# Patient Record
Sex: FEMALE | Race: WHITE | NOT HISPANIC OR LATINO | Employment: OTHER | URBAN - METROPOLITAN AREA
[De-identification: names, ages, dates, MRNs, and addresses within clinical notes are randomized per-mention and may not be internally consistent; named-entity substitution may affect disease eponyms.]

---

## 2017-01-02 ENCOUNTER — APPOINTMENT (OUTPATIENT)
Dept: GENERAL RADIOLOGY | Facility: CLINIC | Age: 27
End: 2017-01-02
Attending: FAMILY MEDICINE
Payer: MEDICARE

## 2017-01-02 ENCOUNTER — HOSPITAL ENCOUNTER (EMERGENCY)
Facility: CLINIC | Age: 27
Discharge: HOME | End: 2017-01-02
Attending: FAMILY MEDICINE | Admitting: FAMILY MEDICINE
Payer: MEDICARE

## 2017-01-02 VITALS
RESPIRATION RATE: 16 BRPM | BODY MASS INDEX: 25.6 KG/M2 | DIASTOLIC BLOOD PRESSURE: 79 MMHG | SYSTOLIC BLOOD PRESSURE: 125 MMHG | OXYGEN SATURATION: 98 % | HEART RATE: 72 BPM | TEMPERATURE: 98.7 F | WEIGHT: 140 LBS

## 2017-01-02 DIAGNOSIS — R10.84 ABDOMINAL PAIN, GENERALIZED: ICD-10-CM

## 2017-01-02 LAB
ALBUMIN SERPL-MCNC: 4.2 G/DL (ref 3.4–5)
ALP SERPL-CCNC: 77 U/L (ref 40–150)
ALT SERPL W P-5'-P-CCNC: 27 U/L (ref 0–50)
ANION GAP SERPL CALCULATED.3IONS-SCNC: 8 MMOL/L (ref 3–14)
AST SERPL W P-5'-P-CCNC: 21 U/L (ref 0–45)
BASOPHILS # BLD AUTO: 0 10E9/L (ref 0–0.2)
BASOPHILS NFR BLD AUTO: 0.3 %
BILIRUB SERPL-MCNC: 0.3 MG/DL (ref 0.2–1.3)
BUN SERPL-MCNC: 15 MG/DL (ref 7–30)
CALCIUM SERPL-MCNC: 9 MG/DL (ref 8.5–10.1)
CHLORIDE SERPL-SCNC: 110 MMOL/L (ref 94–109)
CO2 SERPL-SCNC: 25 MMOL/L (ref 20–32)
CREAT SERPL-MCNC: 0.78 MG/DL (ref 0.52–1.04)
DIFFERENTIAL METHOD BLD: ABNORMAL
EOSINOPHIL # BLD AUTO: 0.1 10E9/L (ref 0–0.7)
EOSINOPHIL NFR BLD AUTO: 2.3 %
ERYTHROCYTE [DISTWIDTH] IN BLOOD BY AUTOMATED COUNT: 11.9 % (ref 10–15)
GFR SERPL CREATININE-BSD FRML MDRD: 88 ML/MIN/1.7M2
GLUCOSE SERPL-MCNC: 96 MG/DL (ref 70–99)
HCT VFR BLD AUTO: 37.8 % (ref 35–47)
HGB BLD-MCNC: 13.3 G/DL (ref 11.7–15.7)
IMM GRANULOCYTES # BLD: 0 10E9/L (ref 0–0.4)
IMM GRANULOCYTES NFR BLD: 0.2 %
LYMPHOCYTES # BLD AUTO: 1.5 10E9/L (ref 0.8–5.3)
LYMPHOCYTES NFR BLD AUTO: 26.6 %
MCH RBC QN AUTO: 33.8 PG (ref 26.5–33)
MCHC RBC AUTO-ENTMCNC: 35.2 G/DL (ref 31.5–36.5)
MCV RBC AUTO: 96 FL (ref 78–100)
MONOCYTES # BLD AUTO: 0.7 10E9/L (ref 0–1.3)
MONOCYTES NFR BLD AUTO: 11.8 %
NEUTROPHILS # BLD AUTO: 3.4 10E9/L (ref 1.6–8.3)
NEUTROPHILS NFR BLD AUTO: 58.8 %
PLATELET # BLD AUTO: 232 10E9/L (ref 150–450)
POTASSIUM SERPL-SCNC: 4 MMOL/L (ref 3.4–5.3)
PROT SERPL-MCNC: 7.5 G/DL (ref 6.8–8.8)
RBC # BLD AUTO: 3.94 10E12/L (ref 3.8–5.2)
SODIUM SERPL-SCNC: 143 MMOL/L (ref 133–144)
WBC # BLD AUTO: 5.8 10E9/L (ref 4–11)

## 2017-01-02 PROCEDURE — 99285 EMERGENCY DEPT VISIT HI MDM: CPT | Mod: 25

## 2017-01-02 PROCEDURE — 25000128 H RX IP 250 OP 636: Performed by: FAMILY MEDICINE

## 2017-01-02 PROCEDURE — 25000132 ZZH RX MED GY IP 250 OP 250 PS 637: Mod: GY | Performed by: FAMILY MEDICINE

## 2017-01-02 PROCEDURE — 80053 COMPREHEN METABOLIC PANEL: CPT | Performed by: FAMILY MEDICINE

## 2017-01-02 PROCEDURE — 96376 TX/PRO/DX INJ SAME DRUG ADON: CPT

## 2017-01-02 PROCEDURE — 25000125 ZZHC RX 250: Performed by: FAMILY MEDICINE

## 2017-01-02 PROCEDURE — 74020 XR ABDOMEN 2 VW: CPT | Mod: TC

## 2017-01-02 PROCEDURE — 85025 COMPLETE CBC W/AUTO DIFF WBC: CPT | Performed by: FAMILY MEDICINE

## 2017-01-02 PROCEDURE — 96374 THER/PROPH/DIAG INJ IV PUSH: CPT

## 2017-01-02 PROCEDURE — 99285 EMERGENCY DEPT VISIT HI MDM: CPT | Performed by: FAMILY MEDICINE

## 2017-01-02 PROCEDURE — A9270 NON-COVERED ITEM OR SERVICE: HCPCS | Mod: GY | Performed by: FAMILY MEDICINE

## 2017-01-02 PROCEDURE — 96361 HYDRATE IV INFUSION ADD-ON: CPT

## 2017-01-02 PROCEDURE — 96375 TX/PRO/DX INJ NEW DRUG ADDON: CPT

## 2017-01-02 RX ORDER — LIDOCAINE 40 MG/G
CREAM TOPICAL
Status: DISCONTINUED | OUTPATIENT
Start: 2017-01-02 | End: 2017-01-02 | Stop reason: HOSPADM

## 2017-01-02 RX ORDER — PROMETHAZINE HYDROCHLORIDE 25 MG/ML
25 INJECTION, SOLUTION INTRAMUSCULAR; INTRAVENOUS ONCE
Status: DISCONTINUED | OUTPATIENT
Start: 2017-01-02 | End: 2017-01-02 | Stop reason: HOSPADM

## 2017-01-02 RX ORDER — ONDANSETRON 2 MG/ML
8 INJECTION INTRAMUSCULAR; INTRAVENOUS ONCE
Status: COMPLETED | OUTPATIENT
Start: 2017-01-02 | End: 2017-01-02

## 2017-01-02 RX ORDER — HYDROCODONE BITARTRATE AND ACETAMINOPHEN 5; 325 MG/1; MG/1
1-2 TABLET ORAL EVERY 4 HOURS PRN
Qty: 12 TABLET | Refills: 0 | Status: SHIPPED | OUTPATIENT
Start: 2017-01-02 | End: 2017-01-30

## 2017-01-02 RX ORDER — POLYETHYLENE GLYCOL 3350 17 G/17G
1 POWDER, FOR SOLUTION ORAL DAILY
Qty: 527 G | Refills: 0 | Status: SHIPPED | OUTPATIENT
Start: 2017-01-02 | End: 2017-02-01

## 2017-01-02 RX ORDER — HYDROMORPHONE HYDROCHLORIDE 1 MG/ML
0.5 INJECTION, SOLUTION INTRAMUSCULAR; INTRAVENOUS; SUBCUTANEOUS
Status: COMPLETED | OUTPATIENT
Start: 2017-01-02 | End: 2017-01-02

## 2017-01-02 RX ORDER — ONDANSETRON 2 MG/ML
4 INJECTION INTRAMUSCULAR; INTRAVENOUS EVERY 30 MIN PRN
Status: DISCONTINUED | OUTPATIENT
Start: 2017-01-02 | End: 2017-01-02

## 2017-01-02 RX ORDER — DIPHENHYDRAMINE HYDROCHLORIDE 50 MG/ML
25 INJECTION INTRAMUSCULAR; INTRAVENOUS ONCE
Status: COMPLETED | OUTPATIENT
Start: 2017-01-02 | End: 2017-01-02

## 2017-01-02 RX ORDER — SODIUM CHLORIDE 9 MG/ML
1000 INJECTION, SOLUTION INTRAVENOUS CONTINUOUS
Status: DISCONTINUED | OUTPATIENT
Start: 2017-01-02 | End: 2017-01-02 | Stop reason: HOSPADM

## 2017-01-02 RX ORDER — ONDANSETRON 4 MG/1
4 TABLET, ORALLY DISINTEGRATING ORAL EVERY 8 HOURS PRN
Qty: 10 TABLET | Refills: 0 | Status: SHIPPED | OUTPATIENT
Start: 2017-01-02 | End: 2017-01-05

## 2017-01-02 RX ADMIN — ONDANSETRON HYDROCHLORIDE 8 MG: 2 SOLUTION INTRAMUSCULAR; INTRAVENOUS at 08:54

## 2017-01-02 RX ADMIN — HYDROMORPHONE HYDROCHLORIDE 0.5 MG: 1 INJECTION, SOLUTION INTRAMUSCULAR; INTRAVENOUS; SUBCUTANEOUS at 10:08

## 2017-01-02 RX ADMIN — PROCHLORPERAZINE EDISYLATE 5 MG: 5 INJECTION INTRAMUSCULAR; INTRAVENOUS at 10:31

## 2017-01-02 RX ADMIN — HYDROMORPHONE HYDROCHLORIDE 0.5 MG: 1 INJECTION, SOLUTION INTRAMUSCULAR; INTRAVENOUS; SUBCUTANEOUS at 09:09

## 2017-01-02 RX ADMIN — LIDOCAINE HYDROCHLORIDE 30 ML: 20 SOLUTION ORAL; TOPICAL at 11:17

## 2017-01-02 RX ADMIN — HYDROMORPHONE HYDROCHLORIDE 0.5 MG: 1 INJECTION, SOLUTION INTRAMUSCULAR; INTRAVENOUS; SUBCUTANEOUS at 09:32

## 2017-01-02 RX ADMIN — DIPHENHYDRAMINE HYDROCHLORIDE 25 MG: 50 INJECTION, SOLUTION INTRAMUSCULAR; INTRAVENOUS at 09:06

## 2017-01-02 RX ADMIN — DIPHENHYDRAMINE HYDROCHLORIDE 25 MG: 50 INJECTION, SOLUTION INTRAMUSCULAR; INTRAVENOUS at 10:25

## 2017-01-02 RX ADMIN — SODIUM CHLORIDE 1000 ML: 9 INJECTION, SOLUTION INTRAVENOUS at 09:03

## 2017-01-02 NOTE — ED AVS SNAPSHOT
[facility] Emergency Department    [address]     [place] [address]    Phone:  [phone]    Fax:  [phone]                                       Katy Islas   MRN: [id number]    Department:  [facility] Emergency Department   Date of Visit:  1/2/2017           Patient Information     Date Of Birth          1990        Your diagnoses for this visit were:     Abdominal pain, generalized        You were seen by Oswaldo Zamora MD.      Follow-up Information     Follow up with Aura Rosario PA-C. Schedule an appointment as soon as possible for a visit in 2 days.    Specialty:  Physician Assistant - Medical    Why:  If not improving.    Contact information:    [address]  [facility] [address]  [phone]          Discharge Instructions         *Abdominal Pain, Unknown Cause (Female)    The exact cause of your abdominal (stomach) pain is not certain. This does not mean that this is something to worry about, or the right tests were not done. Everyone likes to know the exact cause of the problem, but sometimes with abdominal pain, there is no clear-cut cause, and this could be a good thing. The good news is that your symptoms can be treated, and you will feel better.   Your condition does not seem serious now; however, sometimes the signs of a serious problem may take more time to appear. For this reason, it is important for you to watch for any new symptoms, problems, or worsening of your condition.  Over the next few days, the abdominal pain may come and go, or be continuous. Other common symptoms can include nausea and vomiting. Sometimes it can be difficult to tell if you feel nauseous, you may just feel bad and not associate that feeling with nausea. Constipation, diarrhea, and a fever may go along with the pain.  The pain may continue even if treated correctly over the following days. Depending on how things  go, sometimes the cause can become clear and may require further or different treatment. Additional evaluations, medications, or tests may be needed.  Home care  Your health care provider may prescribe medications for pain, symptoms, or an infection.  Follow the health care provider's instructions for taking these medications.  General care    Rest until your next exam. No strenuous activities.    Try to find positions that ease discomfort. A small pillow placed on the abdomen may help relieve pain.    Something warm on your abdomen (such as a heating pad) may help, but be careful not to burn yourself.  Diet    Do not force yourself to eat, especially if having cramps, vomiting, or diarrhea.    Water is important so you do not get dehydrated. Soup may also be good. Sports drinks may also help, especially if they are not too acidic. Make sure you don't drink sugary drinks as this can make things worse. Take liquids in small amounts. Do not guzzle them.    Caffeine sometimes makes the pain and cramping worse.    Avoid dairy products if you have vomiting or diarrhea.    Don't eat large amounts at a time. Wait a few minutes between bites.    Eat a diet low in fiber (called a low-residue diet). Foods allowed include refined breads, white rice, fruit and vegetable juices without pulp, tender meats. These foods will pass more easily through the intestine.    Avoid fried or fatty foods, dairy, alcohol and spicy foods until your symptoms go away.  Follow-up care  Follow up with your health care provider as instructed, or if your pain does not begin to improve in the next 24 hours.  When to seek medical care  Seek prompt medical care if any of the following occur:    Pain gets worse or moves to the right lower abdomen    New or worsening vomiting or diarrhea    Swelling of the abdomen    Unable to pass stool for more than three days    New fever over 101  F (38.3 C), or rising fever    Blood in vomit or bowel movements (dark  red or black color)    Jaundice (yellow color of eyes and skin)    Weakness, dizziness    Chest, arm, back, neck or jaw pain    Unexpected vaginal bleeding or missed period  Call 911  Call emergency services if any of the following occur:    Trouble breathing    Confusion    Fainting or loss of consciousness    Rapid heart rate    Seizure    [id number] Anny Hicks, [address] [facility], [place] [address]. All rights reserved. This information is not intended as a substitute for professional medical care. Always follow your healthcare professional's instructions.          Future Appointments        Provider Department Dept Phone Center    2/1/2017 1:30 PM SHAYNA Matthew [facility] [phone] [facility]    2/1/2017 2:15 PM Vargas Langley MD, MD [facility] [phone] [facility]      24 Hour Appointment Hotline       To make an appointment at any [facility], call [phone] ([phone]). If you don't have a family doctor or clinic, we will help you find one. [facility] are conveniently located to serve the needs of you and your family.             Review of your medicines      START taking        Dose / Directions Last dose taken    HYDROcodone-acetaminophen 5-325 MG per tablet   Commonly known as:  NORCO   Dose:  1-2 tablet   Quantity:  12 tablet        Take 1-2 tablets by mouth every 4 hours as needed for moderate to severe pain   Refills:  0        ondansetron 4 MG ODT tab   Commonly known as:  ZOFRAN ODT   Dose:  4 mg   Quantity:  10 tablet        Take 1 tablet (4 mg) by mouth every 8 hours as needed   Refills:  0        polyethylene glycol powder   Commonly known as:  MIRALAX   Dose:  1 capful   Quantity:  527 g        Take 17 g (1 capful) by mouth daily   Refills:  0          Our records show that you are taking the medicines listed below. If these are incorrect, please call your family doctor or clinic.        Dose / Directions Last  dose taken    albuterol 108 (90 BASE) MCG/ACT Inhaler   Commonly known as:  PROAIR HFA/PROVENTIL HFA/VENTOLIN HFA   Dose:  2 puff   Quantity:  3 Inhaler        Inhale 2 puffs into the lungs every 6 hours as needed for shortness of breath / dyspnea or wheezing   Refills:  1        ALLEGRA 180 MG tablet   Generic drug:  fexofenadine        Take by mouth daily   Refills:  0        ALPRAZolam 0.5 MG tablet   Commonly known as:  XANAX   Dose:  0.5 mg   Quantity:  30 tablet        Take 1 tablet (0.5 mg) by mouth 3 times daily as needed for anxiety   Refills:  0        ARIPiprazole 2 MG tablet   Commonly known as:  ABILIFY   Dose:  2 mg   Quantity:  30 tablet        Take 1 tablet (2 mg) by mouth At Bedtime   Refills:  1        busPIRone 10 MG tablet   Commonly known as:  BUSPAR   Quantity:  25 tablet        1 tablet twice a day for 1 week then 1/2 tablet twice a day for 1 week, then can stop   Refills:  0        cyclobenzaprine 10 MG tablet   Commonly known as:  FLEXERIL   Dose:  10 mg   Quantity:  60 tablet        Take 1 tablet (10 mg) by mouth 2 times daily as needed for muscle spasms or other (back pain)   Refills:  3        dexlansoprazole 60 MG Cpdr CR capsule   Commonly known as:  DEXILANT   Dose:  60 mg   Quantity:  30 capsule        Take 1 capsule (60 mg) by mouth daily   Refills:  3        dicyclomine 20 MG tablet   Commonly known as:  BENTYL   Dose:  20 mg   Quantity:  30 tablet        Take 1 tablet (20 mg) by mouth 4 times daily as needed (ABDOMINAL CRAMPING) AS NEEDED FOR CRAMPING   Refills:  0        DULoxetine 60 MG EC capsule   Commonly known as:  CYMBALTA   Dose:  60 mg   Quantity:  90 capsule        Take 1 capsule (60 mg) by mouth daily   Refills:  3        EPINEPHrine 0.3 MG/0.3ML injection   Dose:  0.3 mg   Quantity:  0.6 mL        Inject 0.3 mLs (0.3 mg) into the muscle once as needed for anaphylaxis   Refills:  1        FLEX-A-MIN JOINT FLEX PO        Take by mouth daily   Refills:  0         fluticasone 50 MCG/ACT spray   Commonly known as:  FLONASE   Dose:  2 spray   Quantity:  1 Package        Spray 2 sprays into both nostrils daily   Refills:  5        fluticasone-salmeterol 100-50 MCG/DOSE diskus inhaler   Commonly known as:  ADVAIR   Dose:  1 puff   Quantity:  1 Inhaler        Inhale 1 puff into the lungs 2 times daily   Refills:  5        gabapentin 300 MG capsule   Commonly known as:  NEURONTIN   Dose:  300 mg   Quantity:  270 capsule        Take 1 capsule (300 mg) by mouth 3 times daily   Refills:  1        Multi-vitamin Tabs tablet   Dose:  1 tablet   Generic drug:  multivitamin, therapeutic with minerals        Take 1 tablet by mouth daily.   Refills:  0        naproxen 500 MG tablet   Commonly known as:  NAPROSYN   Dose:  500 mg   Quantity:  180 tablet        Take 1 tablet (500 mg) by mouth 2 times daily as needed for moderate pain   Refills:  1        order for DME   Quantity:  1 Units        Equipment being ordered: Nebulizer   Refills:  0        promethazine 25 MG tablet   Commonly known as:  PHENERGAN        Take by mouth every 6 hours as needed for nausea   Refills:  0        sucralfate 1 GM tablet   Commonly known as:  CARAFATE        Take by mouth 4 times daily   Refills:  0        Vitamin D-3 5000 UNITS Tabs        Take by mouth daily   Refills:  0                Prescriptions were sent or printed at these locations (3 Prescriptions)                   [facility] [place], [place] - [address] [facility]    [address] , [facility] [address]    Telephone:  [phone]   Fax:  [phone]   Hours:                  E-Prescribed (2 of 3)         ondansetron (ZOFRAN ODT) 4 MG ODT tab               polyethylene glycol (MIRALAX) powder                 Printed at Department/Unit printer (1 of 3)         HYDROcodone-acetaminophen (NORCO) 5-325 MG per tablet                Procedures and tests performed during your visit     CBC with platelets differential    Comprehensive  metabolic panel    Peripheral IV catheter    XR Abdomen 2 Views      Orders Needing Specimen Collection     Ordered          01/02/17 0827  UA with Microscopic - STAT, Prio: STAT, Needs to be Collected     Scheduled Task Status   01/02/17 0828 Collect UA with Microscopic Open   Order Class:  PCU Collect                01/02/17 0921  HCG qualitative urine - STAT, Prio: STAT, Needs to be Collected     Scheduled Task Status   01/02/17 0922 Collect HCG qualitative urine Open   Order Class:  PCU Collect                  Pending Results     No orders found from 1/1/2017 to 1/3/2017.            Thank you for choosing [facility]       Thank you for choosing [facility] for your care. Our goal is always to provide you with excellent care. Hearing back from our patients is one way we can continue to improve our services. Please take a few minutes to complete the written survey that you may receive in the mail after you visit with us. Thank you!        [employer] Information     [employer] gives you secure access to your electronic health record. If you see a primary care provider, you can also send messages to your care team and make appointments. If you have questions, please call your primary care clinic.  If you do not have a primary care provider, please call [phone] and they will assist you.        After Visit Summary       This is your record. Keep this with you and show to your community pharmacist(s) and doctor(s) at your next visit.

## 2017-01-02 NOTE — DISCHARGE INSTRUCTIONS
*Abdominal Pain, Unknown Cause (Female)    The exact cause of your abdominal (stomach) pain is not certain. This does not mean that this is something to worry about, or the right tests were not done. Everyone likes to know the exact cause of the problem, but sometimes with abdominal pain, there is no clear-cut cause, and this could be a good thing. The good news is that your symptoms can be treated, and you will feel better.   Your condition does not seem serious now; however, sometimes the signs of a serious problem may take more time to appear. For this reason, it is important for you to watch for any new symptoms, problems, or worsening of your condition.  Over the next few days, the abdominal pain may come and go, or be continuous. Other common symptoms can include nausea and vomiting. Sometimes it can be difficult to tell if you feel nauseous, you may just feel bad and not associate that feeling with nausea. Constipation, diarrhea, and a fever may go along with the pain.  The pain may continue even if treated correctly over the following days. Depending on how things go, sometimes the cause can become clear and may require further or different treatment. Additional evaluations, medications, or tests may be needed.  Home care  Your health care provider may prescribe medications for pain, symptoms, or an infection.  Follow the health care provider's instructions for taking these medications.  General care    Rest until your next exam. No strenuous activities.    Try to find positions that ease discomfort. A small pillow placed on the abdomen may help relieve pain.    Something warm on your abdomen (such as a heating pad) may help, but be careful not to burn yourself.  Diet    Do not force yourself to eat, especially if having cramps, vomiting, or diarrhea.    Water is important so you do not get dehydrated. Soup may also be good. Sports drinks may also help, especially if they are not too acidic. Make sure you  don't drink sugary drinks as this can make things worse. Take liquids in small amounts. Do not guzzle them.    Caffeine sometimes makes the pain and cramping worse.    Avoid dairy products if you have vomiting or diarrhea.    Don't eat large amounts at a time. Wait a few minutes between bites.    Eat a diet low in fiber (called a low-residue diet). Foods allowed include refined breads, white rice, fruit and vegetable juices without pulp, tender meats. These foods will pass more easily through the intestine.    Avoid fried or fatty foods, dairy, alcohol and spicy foods until your symptoms go away.  Follow-up care  Follow up with your health care provider as instructed, or if your pain does not begin to improve in the next 24 hours.  When to seek medical care  Seek prompt medical care if any of the following occur:    Pain gets worse or moves to the right lower abdomen    New or worsening vomiting or diarrhea    Swelling of the abdomen    Unable to pass stool for more than three days    New fever over 101  F (38.3 C), or rising fever    Blood in vomit or bowel movements (dark red or black color)    Jaundice (yellow color of eyes and skin)    Weakness, dizziness    Chest, arm, back, neck or jaw pain    Unexpected vaginal bleeding or missed period  Call 911  Call emergency services if any of the following occur:    Trouble breathing    Confusion    Fainting or loss of consciousness    Rapid heart rate    Seizure    [id number] Anny [facility]ueNew [facility], [address] [place] [address], [place] [address]. All rights reserved. This information is not intended as a substitute for professional medical care. Always follow your healthcare professional's instructions.

## 2017-01-02 NOTE — ED PROVIDER NOTES
History     Chief Complaint   Patient presents with     Abdominal Pain     HPI  Katy Islas is a 26 year old female who presents with abdominal pain and nausea and vomiting that started yesterday.  Patient states that the pain started 1st and then she developed episodes of vomiting and diarrhea.  She has a history of Crohn's and she feels like might be her Crohn's acting up again.  She is not on any controller medications for her Crohn's but she has been on prednisone in the past when it has flared up.  She denies any blood in her vomit or blood barstools.  She denies any dysuria or hematuria.  She tried Compazine at home which did not help.  Patient states nothing makes the pain better or worse.  The pain is constant and located in the left side of her abdomen.  She is requesting medications for her pain which include: Zofran, Dilaudid, Benadryl.    I have reviewed the Medications, Allergies, Past Medical and Surgical History, and Social History in the Epic system.    Review of Systems   All other systems reviewed and are negative.      Physical Exam   BP: 134/89 mmHg  Pulse: 97  Temp: 98.7  F (37.1  C)  Resp: 14  Weight: 63.504 kg (140 lb)  SpO2: 99 %  Physical Exam   Constitutional: No distress.   HENT:   Head: Atraumatic.   Mouth/Throat: Oropharynx is clear and moist. No oropharyngeal exudate.   Eyes: Pupils are equal, round, and reactive to light. No scleral icterus.   Cardiovascular: Normal heart sounds and intact distal pulses.    Pulmonary/Chest: Breath sounds normal. No respiratory distress.   Abdominal: Soft. Bowel sounds are normal. There is tenderness (LUQ/RUQ).   Musculoskeletal: She exhibits no edema or tenderness.   Skin: Skin is warm. No rash noted. She is not diaphoretic.       ED Course   Procedures        Results for orders placed or performed during the hospital encounter of 01/02/17   XR Abdomen 2 Views    Narrative    XR ABDOMEN 2 VW 1/2/2017 9:27 AM    HISTORY: Vomiting.    COMPARISON:  5/17/2015    FINDINGS: No evidence of free intraperitoneal air. The bowel gas  pattern is nonobstructive. Moderate stool in the colon.  Cholecystectomy clips.      Impression    IMPRESSION: Nonobstructive gas pattern.    MARICHUY HERNANDEZ MD   CBC with platelets differential   Result Value Ref Range    WBC 5.8 4.0 - 11.0 10e9/L    RBC Count 3.94 3.8 - 5.2 10e12/L    Hemoglobin 13.3 11.7 - 15.7 g/dL    Hematocrit 37.8 35.0 - 47.0 %    MCV 96 78 - 100 fl    MCH 33.8 (H) 26.5 - 33.0 pg    MCHC 35.2 31.5 - 36.5 g/dL    RDW 11.9 10.0 - 15.0 %    Platelet Count 232 150 - 450 10e9/L    Diff Method Automated Method     % Neutrophils 58.8 %    % Lymphocytes 26.6 %    % Monocytes 11.8 %    % Eosinophils 2.3 %    % Basophils 0.3 %    % Immature Granulocytes 0.2 %    Absolute Neutrophil 3.4 1.6 - 8.3 10e9/L    Absolute Lymphocytes 1.5 0.8 - 5.3 10e9/L    Absolute Monocytes 0.7 0.0 - 1.3 10e9/L    Absolute Eosinophils 0.1 0.0 - 0.7 10e9/L    Absolute Basophils 0.0 0.0 - 0.2 10e9/L    Abs Immature Granulocytes 0.0 0 - 0.4 10e9/L   Comprehensive metabolic panel   Result Value Ref Range    Sodium 143 133 - 144 mmol/L    Potassium 4.0 3.4 - 5.3 mmol/L    Chloride 110 (H) 94 - 109 mmol/L    Carbon Dioxide 25 20 - 32 mmol/L    Anion Gap 8 3 - 14 mmol/L    Glucose 96 70 - 99 mg/dL    Urea Nitrogen 15 7 - 30 mg/dL    Creatinine 0.78 0.52 - 1.04 mg/dL    GFR Estimate 88 >60 mL/min/1.7m2    GFR Estimate If Black >90   GFR Calc   >60 mL/min/1.7m2    Calcium 9.0 8.5 - 10.1 mg/dL    Bilirubin Total 0.3 0.2 - 1.3 mg/dL    Albumin 4.2 3.4 - 5.0 g/dL    Protein Total 7.5 6.8 - 8.8 g/dL    Alkaline Phosphatase 77 40 - 150 U/L    ALT 27 0 - 50 U/L    AST 21 0 - 45 U/L     *Note: Due to a large number of results and/or encounters for the requested time period, some results have not been displayed. A complete set of results can be found in Results Review.     Medications   lidocaine 1 % 1 mL (not administered)   lidocaine (LMX4) kit  (not administered)   sodium chloride (PF) 0.9% PF flush 3 mL (not administered)   sodium chloride (PF) 0.9% PF flush 3 mL (not administered)   0.9% sodium chloride BOLUS (1,000 mLs Intravenous New Bag 1/2/17 0903)     Followed by   0.9% sodium chloride infusion (not administered)   promethazine (PHENERGAN) IV injection 25 mg (25 mg Intravenous Not Given 1/2/17 0954)   HYDROmorphone (PF) (DILAUDID) injection 0.5 mg (0.5 mg Intravenous Given 1/2/17 1008)   diphenhydrAMINE (BENADRYL) injection 25 mg (25 mg Intravenous Given 1/2/17 0906)   ondansetron (ZOFRAN) injection 8 mg (8 mg Intravenous Given 1/2/17 0854)   diphenhydrAMINE (BENADRYL) injection 25 mg (25 mg Intravenous Given 1/2/17 1025)   lidocaine (XYLOCAINE) 2 % 15 mL, alum & mag hydroxide-simethicone (MYLANTA ES/MAALOX  ES) 15 mL GI Cocktail (30 mLs Oral Given 1/2/17 1117)   prochlorperazine (COMPAZINE) injection 5 mg (5 mg Intravenous Given 1/2/17 1031)     patient feels better after the above medications were given.  She is sleeping comfortably.  Labs are unremarkable in the x-ray showed signs of constipation.  With her have any normal white blood cell count and a nonsurgical abdomen, I don't think we need to do any type of CT scan.  Recommended she takes a MiraLAX to see if we can get her cleaned out.  I did send her home with a few pain meds, Zofran and a prescription for MiraLAX.  However follow-up additional improvement in the next couple days.    Assessments & Plan (with Medical Decision Making)  abdominal pain      I have reviewed the nursing notes.    I have reviewed the findings, diagnosis, plan and need for follow up with the patient.          1/2/2017   [facility] EMERGENCY DEPARTMENT      Oswaldo Zamora MD  01/02/17 2944

## 2017-01-02 NOTE — ED AVS SNAPSHOT
[facility] Emergency Department    [address] [place] [address]    Phone:  [phone]    Fax:  [phone]                                       Katy Islas   MRN: [id number]    Department:  [facility] Emergency Department   Date of Visit:  1/2/2017           After Visit Summary Signature Page     I have received my discharge instructions, and my questions have been answered. I have discussed any challenges I see with this plan with the nurse or doctor.    ..........................................................................................................................................  Patient/Patient Representative Signature      ..........................................................................................................................................  Patient Representative Print Name and Relationship to Patient    ..................................................               ................................................  Date                                            Time    ..........................................................................................................................................  Reviewed by Signature/Title    ...................................................              ..............................................  Date                                                            Time

## 2017-01-03 NOTE — PROGRESS NOTES
SUBJECTIVE:                                                    Katy Islas is a 26 year old female who presents to clinic today for the following health issues:      HPI         Vaginal Symptoms     Onset: 1 week     Description:  Vaginal Discharge: white   Itching (Pruritis): YES  Burning sensation:  YES  Odor: YES    Accompanying Signs & Symptoms:  Pain with Urination: YES  Abdominal Pain: YES  Fever: YES   History:   Sexually active: YES  New Partner: no   Possibility of Pregnancy:  No    Precipitating factors:   Recent Antibiotic Use: YES- used an old antibiotic she had    Alleviating factors:     Therapies Tried and outcome: drinking water, took an old antibiotic she had and it got a little bit better      Concern - Spots on back x4     Onset: a year     Description:   Four spots, middle back    Intensity: mild    Progression of Symptoms:  Seem to be getting bigger and darker    Accompanying Signs & Symptoms:         Previous history of similar problem:       Precipitating factors:   Worsened by:     Alleviating factors:  Improved by:        Therapies Tried and outcome: none        Depression and Anxiety Follow-Up    Status since last visit: No change/ still bad     Other associated symptoms:None    Complicating factors:     Significant life event: No     Current substance abuse: None    Pt feels her mood has gotten worse in the last week -  Having bad dreams/nightmares since Sunday  Does have an appt with counselor today  Mom thinks maybe pt needs a different medication  - Xanax makes her angry, taken only about 4   - Mom suggested Ativan   - Abilify going fine, does make her tired in AM, takes around 8 pm   - Wondering about Vitamin D, does take supplement, told she has deficiency in the past     PHQ-9 SCORE 11/7/2016 12/2/2016 12/30/2016   Total Score - - -   Total Score 8 3 16     JULIETH-7 SCORE 3/8/2016 12/2/2016 12/30/2016   Total Score - - -   Total Score 18 13 18        PHQ-9  English      PHQ-9    "Any Language     GAD7     Problem list and histories reviewed & adjusted, as indicated.  Additional history: as documented    Labs reviewed in EPIC  Problem list, Medication list, Allergies, and Medical/Social/Surgical histories reviewed in [facility] and updated as appropriate.    ROS:  Constitutional, HEENT, cardiovascular, pulmonary, gi and gu systems are negative, except as otherwise noted.    OBJECTIVE:                                                    /82 mmHg  Pulse 98  Temp(Src) 98.4  F (36.9  C) (Oral)  Resp 16  Ht 5' 1.22\" (1.555 m)  Wt 154 lb (69.854 kg)  BMI 28.89 kg/m2  SpO2 97%  Body mass index is 28.89 kg/(m^2).  GENERAL APPEARANCE: healthy, alert and no distress  EYES: Eyes grossly normal to inspection, PERRLA, conjunctivae and sclerae without injection or discharge, EOM intact   MS: No musculoskeletal defects are noted and gait is age appropriate without ataxia   SKIN: 0.6 and 0.7 atypical flat moles on back, brown with irregular borders, oval shaped, left mid back, no other suspicious lesions or rashes, hydration status appears adeuqate with normal skin turgor   PSYCH: Alert and oriented x3; speech- coherent , normal rate and volume; able to articulate logical thoughts, able to abstract reason, no tangential thoughts, no hallucinations or delusions, mentation appears normal, Mood is euthymic. Affect is appropriate for this mood state and bright. Thought content is free of suicidal ideation, hallucinations, and delusions. Dress is adequate and upkept. Eye contact is good during conversation.       Diagnostic Test Results:  Results for orders placed or performed in visit on 01/06/17 (from the past 24 hour(s))   Wet prep   Result Value Ref Range    Specimen Description Vagina     Wet Prep       No Trichomonas seen  No clue cells seen  No yeast seen      Micro Report Status FINAL 01/06/2017    *UA reflex to Microscopic and Culture ([facility] (except [place] and " [place])   Result Value Ref Range    Color Urine Yellow     Appearance Urine Clear     Glucose Urine Negative NEG mg/dL    Bilirubin Urine Negative NEG    Ketones Urine Negative NEG mg/dL    Specific Gravity Urine >1.030 1.003 - 1.035    Blood Urine Negative NEG    pH Urine 6.0 5.0 - 7.0 pH    Protein Albumin Urine Negative NEG mg/dL    Urobilinogen Urine 0.2 0.2 - 1.0 EU/dL    Nitrite Urine Negative NEG    Leukocyte Esterase Urine Negative NEG    Source Unspecified Urine      *Note: Due to a large number of results and/or encounters for the requested time period, some results have not been displayed. A complete set of results can be found in Results Review.        ASSESSMENT/PLAN:                                                        ICD-10-CM    1. Adjustment disorder with mixed anxiety and depressed mood F43.23 LORazepam (ATIVAN) 0.5 MG tablet   2. Vaginal itching L29.8 Wet prep     *UA reflex to Microscopic and Culture ([facility] (except [place] and [place])   3. Fatigue, unspecified type R53.83    4. Major depressive disorder, recurrent episode, mild (H) F33.0    5. Bipolar affective disorder in remission (H) F31.70    6. Vitamin D deficiency E55.9 Vitamin D Deficiency     - Discussed no signs of infection on labs   - Could be due to severe PMS as patient reports mood changes and breast pain, missed Depo as of 12/18/16, discussed waiting on this some more as it gets out of her system   - Only been 1 week on Abilify, will continue, recommend moving to around dinner time   - Will have patient STOP xanax and given a small amount of Ativan to try for anger outbursts/panic attacks   - Recheck in 2-3 weeks   - Will get vitamin D level today   - Continue with counseling   - Discussed risks and benefits of procedure including bleeding, infection, and scarring   - Recommend procedure as follows:          1) Left mid back superior - 7 mm punch/shave biopsy, with sutures and removal in  10-14 days                   2) Left mid back inferior  - 6 mm punch/shave biopsy, with sutures and removal in 10-14 days         Supplies: 1% lidocaine with epi, 2 alcohol wipes, chloraprep, biopsy blade - 7 and 6 mm, lac pack, Sutures: 3-0 Ethilon, sterile 4x4 pack, 2 - sterile 2x2, 2 - small tegaderm      The patient indicates understanding of these issues and agrees with the plan.    Follow up: 2-3 weeks for mood recheck, as scheduled for mole removal         Aura Rosario PA-C  [facility]

## 2017-01-06 ENCOUNTER — OFFICE VISIT (OUTPATIENT)
Dept: FAMILY MEDICINE | Facility: OTHER | Age: 27
End: 2017-01-06
Payer: MEDICARE

## 2017-01-06 VITALS
OXYGEN SATURATION: 97 % | SYSTOLIC BLOOD PRESSURE: 132 MMHG | TEMPERATURE: 98.4 F | WEIGHT: 154 LBS | HEART RATE: 98 BPM | HEIGHT: 61 IN | BODY MASS INDEX: 29.07 KG/M2 | RESPIRATION RATE: 16 BRPM | DIASTOLIC BLOOD PRESSURE: 82 MMHG

## 2017-01-06 DIAGNOSIS — N89.8 VAGINAL ITCHING: ICD-10-CM

## 2017-01-06 DIAGNOSIS — R53.83 FATIGUE, UNSPECIFIED TYPE: ICD-10-CM

## 2017-01-06 DIAGNOSIS — D22.9 ATYPICAL MOLE: ICD-10-CM

## 2017-01-06 DIAGNOSIS — F31.70 BIPOLAR AFFECTIVE DISORDER IN REMISSION (H): ICD-10-CM

## 2017-01-06 DIAGNOSIS — F43.23 ADJUSTMENT DISORDER WITH MIXED ANXIETY AND DEPRESSED MOOD: Primary | ICD-10-CM

## 2017-01-06 DIAGNOSIS — E55.9 VITAMIN D DEFICIENCY: ICD-10-CM

## 2017-01-06 DIAGNOSIS — F33.0 MAJOR DEPRESSIVE DISORDER, RECURRENT EPISODE, MILD (H): ICD-10-CM

## 2017-01-06 LAB
ALBUMIN UR-MCNC: NEGATIVE MG/DL
APPEARANCE UR: CLEAR
BILIRUB UR QL STRIP: NEGATIVE
COLOR UR AUTO: YELLOW
GLUCOSE UR STRIP-MCNC: NEGATIVE MG/DL
HGB UR QL STRIP: NEGATIVE
KETONES UR STRIP-MCNC: NEGATIVE MG/DL
LEUKOCYTE ESTERASE UR QL STRIP: NEGATIVE
MICRO REPORT STATUS: NORMAL
NITRATE UR QL: NEGATIVE
PH UR STRIP: 6 PH (ref 5–7)
SP GR UR STRIP: >1.03 (ref 1–1.03)
SPECIMEN SOURCE: NORMAL
URN SPEC COLLECT METH UR: NORMAL
UROBILINOGEN UR STRIP-ACNC: 0.2 EU/DL (ref 0.2–1)
WET PREP SPEC: NORMAL

## 2017-01-06 PROCEDURE — 99214 OFFICE O/P EST MOD 30 MIN: CPT | Performed by: PHYSICIAN ASSISTANT

## 2017-01-06 PROCEDURE — 36415 COLL VENOUS BLD VENIPUNCTURE: CPT | Performed by: PHYSICIAN ASSISTANT

## 2017-01-06 PROCEDURE — 81003 URINALYSIS AUTO W/O SCOPE: CPT | Performed by: PHYSICIAN ASSISTANT

## 2017-01-06 PROCEDURE — 87210 SMEAR WET MOUNT SALINE/INK: CPT | Performed by: PHYSICIAN ASSISTANT

## 2017-01-06 PROCEDURE — T1013 SIGN LANG/ORAL INTERPRETER: HCPCS | Mod: U3 | Performed by: PHYSICIAN ASSISTANT

## 2017-01-06 PROCEDURE — 82306 VITAMIN D 25 HYDROXY: CPT | Performed by: PHYSICIAN ASSISTANT

## 2017-01-06 RX ORDER — LORAZEPAM 0.5 MG/1
0.5 TABLET ORAL EVERY 8 HOURS PRN
Qty: 10 TABLET | Refills: 0 | Status: SHIPPED | OUTPATIENT
Start: 2017-01-06 | End: 2017-01-09

## 2017-01-06 ASSESSMENT — ANXIETY QUESTIONNAIRES
7. FEELING AFRAID AS IF SOMETHING AWFUL MIGHT HAPPEN: MORE THAN HALF THE DAYS
6. BECOMING EASILY ANNOYED OR IRRITABLE: NEARLY EVERY DAY
1. FEELING NERVOUS, ANXIOUS, OR ON EDGE: NEARLY EVERY DAY
2. NOT BEING ABLE TO STOP OR CONTROL WORRYING: NEARLY EVERY DAY
5. BEING SO RESTLESS THAT IT IS HARD TO SIT STILL: NEARLY EVERY DAY
3. WORRYING TOO MUCH ABOUT DIFFERENT THINGS: NEARLY EVERY DAY
GAD7 TOTAL SCORE: 20
IF YOU CHECKED OFF ANY PROBLEMS ON THIS QUESTIONNAIRE, HOW DIFFICULT HAVE THESE PROBLEMS MADE IT FOR YOU TO DO YOUR WORK, TAKE CARE OF THINGS AT HOME, OR GET ALONG WITH OTHER PEOPLE: EXTREMELY DIFFICULT

## 2017-01-06 ASSESSMENT — PATIENT HEALTH QUESTIONNAIRE - PHQ9: 5. POOR APPETITE OR OVEREATING: NEARLY EVERY DAY

## 2017-01-06 NOTE — PROGRESS NOTES
Quick Note:    Test results discussed with patient during office visit.     Aura Rosario PA-C  ______

## 2017-01-06 NOTE — MR AVS SNAPSHOT
After Visit Summary   1/6/2017    Katy Islas    MRN: [id number]           Patient Information     Date Of Birth          1990        Visit Information        Provider Department      1/6/2017 8:30 AM Dayana Roldan; Aura Rosario PA-C [facility]        Today's Diagnoses     Adjustment disorder with mixed anxiety and depressed mood    -  1     Vaginal itching         Fatigue, unspecified type         Major depressive disorder, recurrent episode, mild (H)         Bipolar affective disorder in remission (H)         Vitamin D deficiency           Care Instructions    - Move Abilify to with supper   - Vitamin D testing today   - STOP Xanax, trial of Ativan   - Recheck 2-3 weeks              Follow-ups after your visit        Your next 10 appointments already scheduled     Jan 13, 2017 12:00 PM   Office Visit with Aura Rosario PA-C, NL PROC ROOM 1ST FLOOR [facility] ([facility])    [address] [id number] [phone]           Bring a current list of meds and any records pertaining to this visit.  For Physicals, please bring immunization records and any forms needing to be filled out.  Please arrive 10 minutes early to complete paperwork.            Feb 01, 2017  1:30 PM   New Visit with SHAYNA Matthew   [facility] ([facility])    [address] [id number] [phone]            Feb 01, 2017  2:15 PM   New Visit with Vargas Langley MD   [facility] ([facility])    [address]  [address]  [facility] [id number] [phone]              Who to contact     If you have questions or need follow up information about today's clinic visit or your schedule please contact [facility] directly at [phone].  Normal or non-critical lab and imaging results will be  "communicated to you by [employer], letter or phone within 4 business days after the clinic has received the results. If you do not hear from us within 7 days, please contact the clinic through [employer] or phone. If you have a critical or abnormal lab result, we will notify you by phone as soon as possible.  Submit refill requests through [employer] or call your pharmacy and they will forward the refill request to us. Please allow 3 business days for your refill to be completed.          Additional Information About Your Visit        [employer] Information     [employer] gives you secure access to your electronic health record. If you see a primary care provider, you can also send messages to your care team and make appointments. If you have questions, please call your primary care clinic.  If you do not have a primary care provider, please call [phone] and they will assist you.        Care EveryWhere ID     This is your Care EveryWhere ID. This could be used by other organizations to access your [facility] medical records  [id number]        Your Vitals Were     Pulse Temperature Respirations Height BMI (Body Mass Index) Pulse Oximetry    98 98.4  F (36.9  C) (Oral) 16 5' 1.22\" (1.555 m) 28.89 kg/m2 97%       Blood Pressure from Last 3 Encounters:   01/06/17 132/82   01/02/17 125/79   12/30/16 118/76    Weight from Last 3 Encounters:   01/06/17 154 lb (69.854 kg)   01/02/17 140 lb (63.504 kg)   12/30/16 152 lb (68.947 kg)              We Performed the Following     *UA reflex to Microscopic and Culture ([facility] (except [place] and [place])     Vitamin D Deficiency     Wet prep          Today's Medication Changes          These changes are accurate as of: 1/6/17  9:28 AM.  If you have any questions, ask your nurse or doctor.               Start taking these medicines.        Dose/Directions    LORazepam 0.5 MG tablet   Commonly known as:  ATIVAN   Used for:  Adjustment disorder with mixed " anxiety and depressed mood   Started by:  Aura Rosario PA-C        Dose:  0.5 mg   Take 1 tablet (0.5 mg) by mouth every 8 hours as needed for anxiety   Quantity:  10 tablet   Refills:  0            Where to get your medicines      Some of these will need a paper prescription and others can be bought over the counter.  Ask your nurse if you have questions.     Bring a paper prescription for each of these medications    - LORazepam 0.5 MG tablet             Primary Care Provider Office Phone # Fax #    Aura Rosario PA-C [phone]       [facility] [address]  [facility] [address]        Thank you!     Thank you for choosing [facility]  for your care. Our goal is always to provide you with excellent care. Hearing back from our patients is one way we can continue to improve our services. Please take a few minutes to complete the written survey that you may receive in the mail after your visit with us. Thank you!             Your Updated Medication List - Protect others around you: Learn how to safely use, store and throw away your medicines at www.disposemymeds.org.          This list is accurate as of: 1/6/17  9:28 AM.  Always use your most recent med list.                   Brand Name Dispense Instructions for use    albuterol 108 (90 BASE) MCG/ACT Inhaler    PROAIR HFA/PROVENTIL HFA/VENTOLIN HFA    3 Inhaler    Inhale 2 puffs into the lungs every 6 hours as needed for shortness of breath / dyspnea or wheezing       ALLEGRA 180 MG tablet   Generic drug:  fexofenadine      Take by mouth daily       ALPRAZolam 0.5 MG tablet    XANAX    30 tablet    Take 1 tablet (0.5 mg) by mouth 3 times daily as needed for anxiety       ARIPiprazole 2 MG tablet    ABILIFY    30 tablet    Take 1 tablet (2 mg) by mouth At Bedtime       busPIRone 10 MG tablet    BUSPAR    25 tablet    1 tablet twice a day for 1 week then 1/2 tablet twice a day  for 1 week, then can stop       cyclobenzaprine 10 MG tablet    FLEXERIL    60 tablet    Take 1 tablet (10 mg) by mouth 2 times daily as needed for muscle spasms or other (back pain)       dexlansoprazole 60 MG Cpdr CR capsule    DEXILANT    30 capsule    Take 1 capsule (60 mg) by mouth daily       dicyclomine 20 MG tablet    BENTYL    30 tablet    Take 1 tablet (20 mg) by mouth 4 times daily as needed (ABDOMINAL CRAMPING) AS NEEDED FOR CRAMPING       DULoxetine 60 MG EC capsule    CYMBALTA    90 capsule    Take 1 capsule (60 mg) by mouth daily       EPINEPHrine 0.3 MG/0.3ML injection     0.6 mL    Inject 0.3 mLs (0.3 mg) into the muscle once as needed for anaphylaxis       FLEX-A-MIN JOINT FLEX PO      Take by mouth daily       fluticasone 50 MCG/ACT spray    FLONASE    1 Package    Spray 2 sprays into both nostrils daily       fluticasone-salmeterol 100-50 MCG/DOSE diskus inhaler    ADVAIR    1 Inhaler    Inhale 1 puff into the lungs 2 times daily       gabapentin 300 MG capsule    NEURONTIN    270 capsule    Take 1 capsule (300 mg) by mouth 3 times daily       HYDROcodone-acetaminophen 5-325 MG per tablet    NORCO    12 tablet    Take 1-2 tablets by mouth every 4 hours as needed for moderate to severe pain       LORazepam 0.5 MG tablet    ATIVAN    10 tablet    Take 1 tablet (0.5 mg) by mouth every 8 hours as needed for anxiety       Multi-vitamin Tabs tablet   Generic drug:  multivitamin, therapeutic with minerals      Take 1 tablet by mouth daily.       naproxen 500 MG tablet    NAPROSYN    180 tablet    Take 1 tablet (500 mg) by mouth 2 times daily as needed for moderate pain       order for [facility]     1 Units    Equipment being ordered: Nebulizer       polyethylene glycol powder    MIRALAX    527 g    Take 17 g (1 capful) by mouth daily       promethazine 25 MG tablet    PHENERGAN     Take by mouth every 6 hours as needed for nausea       sucralfate 1 GM tablet    CARAFATE     Take by mouth 4 times daily        Vitamin D-3 5000 UNITS Tabs      Take by mouth daily

## 2017-01-06 NOTE — PATIENT INSTRUCTIONS
- Move Abilify to with supper   - Vitamin D testing today   - STOP Xanax, trial of Ativan   - Recheck 2-3 weeks

## 2017-01-06 NOTE — NURSING NOTE
"Chief Complaint   Patient presents with     Vaginal Problem     Mole     check spots on back     Panel Management     height, phq9, UA       Initial /82 mmHg  Pulse 98  Temp(Src) 98.4  F (36.9  C) (Oral)  Resp 16  Ht 5' 1.22\" (1.555 m)  Wt 154 lb (69.854 kg)  BMI 28.89 kg/m2  SpO2 97% Estimated body mass index is 28.89 kg/(m^2) as calculated from the following:    Height as of this encounter: 5' 1.22\" (1.555 m).    Weight as of this encounter: 154 lb (69.854 kg).  BP completed using cuff size: regular  "

## 2017-01-07 ASSESSMENT — ANXIETY QUESTIONNAIRES: GAD7 TOTAL SCORE: 20

## 2017-01-08 ENCOUNTER — HOSPITAL ENCOUNTER (EMERGENCY)
Facility: CLINIC | Age: 27
Discharge: HOME | End: 2017-01-08
Attending: NURSE PRACTITIONER | Admitting: NURSE PRACTITIONER
Payer: MEDICARE

## 2017-01-08 ENCOUNTER — TELEPHONE (OUTPATIENT)
Dept: NURSING | Facility: CLINIC | Age: 27
End: 2017-01-08

## 2017-01-08 VITALS
BODY MASS INDEX: 26.26 KG/M2 | OXYGEN SATURATION: 99 % | WEIGHT: 140 LBS | RESPIRATION RATE: 16 BRPM | SYSTOLIC BLOOD PRESSURE: 143 MMHG | TEMPERATURE: 98 F | HEART RATE: 89 BPM | DIASTOLIC BLOOD PRESSURE: 89 MMHG

## 2017-01-08 DIAGNOSIS — T14.8XXA BRUISING: ICD-10-CM

## 2017-01-08 LAB
ALBUMIN SERPL-MCNC: 4.3 G/DL (ref 3.4–5)
ALBUMIN UR-MCNC: NEGATIVE MG/DL
ALP SERPL-CCNC: 67 U/L (ref 40–150)
ALT SERPL W P-5'-P-CCNC: 41 U/L (ref 0–50)
ANION GAP SERPL CALCULATED.3IONS-SCNC: 8 MMOL/L (ref 3–14)
APPEARANCE UR: CLEAR
APTT PPP: 25 SEC (ref 22–37)
AST SERPL W P-5'-P-CCNC: 25 U/L (ref 0–45)
BACTERIA #/AREA URNS HPF: ABNORMAL /HPF
BASOPHILS # BLD AUTO: 0 10E9/L (ref 0–0.2)
BASOPHILS NFR BLD AUTO: 0.3 %
BILIRUB SERPL-MCNC: 0.2 MG/DL (ref 0.2–1.3)
BILIRUB UR QL STRIP: NEGATIVE
BUN SERPL-MCNC: 21 MG/DL (ref 7–30)
CALCIUM SERPL-MCNC: 9.6 MG/DL (ref 8.5–10.1)
CHLORIDE SERPL-SCNC: 107 MMOL/L (ref 94–109)
CO2 SERPL-SCNC: 31 MMOL/L (ref 20–32)
COLOR UR AUTO: YELLOW
CREAT SERPL-MCNC: 0.74 MG/DL (ref 0.52–1.04)
DIFFERENTIAL METHOD BLD: ABNORMAL
EOSINOPHIL # BLD AUTO: 0.1 10E9/L (ref 0–0.7)
EOSINOPHIL NFR BLD AUTO: 0.8 %
ERYTHROCYTE [DISTWIDTH] IN BLOOD BY AUTOMATED COUNT: 12.6 % (ref 10–15)
GFR SERPL CREATININE-BSD FRML MDRD: ABNORMAL ML/MIN/1.7M2
GLUCOSE SERPL-MCNC: 96 MG/DL (ref 70–99)
GLUCOSE UR STRIP-MCNC: NEGATIVE MG/DL
HCG UR QL: NEGATIVE
HCT VFR BLD AUTO: 40.9 % (ref 35–47)
HGB BLD-MCNC: 14 G/DL (ref 11.7–15.7)
HGB UR QL STRIP: NEGATIVE
IMM GRANULOCYTES # BLD: 0 10E9/L (ref 0–0.4)
IMM GRANULOCYTES NFR BLD: 0.5 %
INR PPP: 0.91 (ref 0.86–1.14)
KETONES UR STRIP-MCNC: NEGATIVE MG/DL
LEUKOCYTE ESTERASE UR QL STRIP: NEGATIVE
LYMPHOCYTES # BLD AUTO: 1.4 10E9/L (ref 0.8–5.3)
LYMPHOCYTES NFR BLD AUTO: 19 %
MCH RBC QN AUTO: 33.8 PG (ref 26.5–33)
MCHC RBC AUTO-ENTMCNC: 34.2 G/DL (ref 31.5–36.5)
MCV RBC AUTO: 99 FL (ref 78–100)
MONOCYTES # BLD AUTO: 0.9 10E9/L (ref 0–1.3)
MONOCYTES NFR BLD AUTO: 12.5 %
MUCOUS THREADS #/AREA URNS LPF: PRESENT /LPF
NEUTROPHILS # BLD AUTO: 4.9 10E9/L (ref 1.6–8.3)
NEUTROPHILS NFR BLD AUTO: 66.9 %
NITRATE UR QL: NEGATIVE
NON-SQ EPI CELLS #/AREA URNS LPF: ABNORMAL /LPF
PH UR STRIP: 7 PH (ref 5–7)
PLATELET # BLD AUTO: 275 10E9/L (ref 150–450)
POTASSIUM SERPL-SCNC: 4.1 MMOL/L (ref 3.4–5.3)
PROT SERPL-MCNC: 7.7 G/DL (ref 6.8–8.8)
RBC # BLD AUTO: 4.14 10E12/L (ref 3.8–5.2)
RBC #/AREA URNS AUTO: ABNORMAL /HPF (ref 0–2)
RENAL EPI CELLS #/AREA URNS HPF: ABNORMAL /HPF
SODIUM SERPL-SCNC: 146 MMOL/L (ref 133–144)
SP GR UR STRIP: 1.01 (ref 1–1.03)
URN SPEC COLLECT METH UR: ABNORMAL
UROBILINOGEN UR STRIP-ACNC: 0.2 EU/DL (ref 0.2–1)
WBC # BLD AUTO: 7.4 10E9/L (ref 4–11)
WBC #/AREA URNS AUTO: ABNORMAL /HPF (ref 0–2)

## 2017-01-08 PROCEDURE — 80053 COMPREHEN METABOLIC PANEL: CPT | Performed by: NURSE PRACTITIONER

## 2017-01-08 PROCEDURE — 99284 EMERGENCY DEPT VISIT MOD MDM: CPT | Mod: 25

## 2017-01-08 PROCEDURE — 25000128 H RX IP 250 OP 636: Performed by: NURSE PRACTITIONER

## 2017-01-08 PROCEDURE — 85730 THROMBOPLASTIN TIME PARTIAL: CPT | Performed by: NURSE PRACTITIONER

## 2017-01-08 PROCEDURE — 81001 URINALYSIS AUTO W/SCOPE: CPT | Performed by: NURSE PRACTITIONER

## 2017-01-08 PROCEDURE — 85610 PROTHROMBIN TIME: CPT | Performed by: NURSE PRACTITIONER

## 2017-01-08 PROCEDURE — 99284 EMERGENCY DEPT VISIT MOD MDM: CPT | Performed by: NURSE PRACTITIONER

## 2017-01-08 PROCEDURE — 25000125 ZZHC RX 250: Performed by: NURSE PRACTITIONER

## 2017-01-08 PROCEDURE — 96375 TX/PRO/DX INJ NEW DRUG ADDON: CPT

## 2017-01-08 PROCEDURE — 85025 COMPLETE CBC W/AUTO DIFF WBC: CPT | Performed by: NURSE PRACTITIONER

## 2017-01-08 PROCEDURE — 96374 THER/PROPH/DIAG INJ IV PUSH: CPT

## 2017-01-08 PROCEDURE — 96361 HYDRATE IV INFUSION ADD-ON: CPT

## 2017-01-08 PROCEDURE — 81025 URINE PREGNANCY TEST: CPT | Performed by: NURSE PRACTITIONER

## 2017-01-08 RX ORDER — PROMETHAZINE HYDROCHLORIDE 25 MG/ML
25 INJECTION, SOLUTION INTRAMUSCULAR; INTRAVENOUS ONCE
Status: COMPLETED | OUTPATIENT
Start: 2017-01-08 | End: 2017-01-08

## 2017-01-08 RX ORDER — LIDOCAINE 40 MG/G
CREAM TOPICAL
Status: DISCONTINUED | OUTPATIENT
Start: 2017-01-08 | End: 2017-01-09 | Stop reason: HOSPADM

## 2017-01-08 RX ORDER — DIPHENHYDRAMINE HYDROCHLORIDE 50 MG/ML
25 INJECTION INTRAMUSCULAR; INTRAVENOUS ONCE
Status: COMPLETED | OUTPATIENT
Start: 2017-01-08 | End: 2017-01-08

## 2017-01-08 RX ORDER — ONDANSETRON 2 MG/ML
4 INJECTION INTRAMUSCULAR; INTRAVENOUS EVERY 30 MIN PRN
Status: DISCONTINUED | OUTPATIENT
Start: 2017-01-08 | End: 2017-01-09 | Stop reason: HOSPADM

## 2017-01-08 RX ADMIN — DIPHENHYDRAMINE HYDROCHLORIDE 25 MG: 50 INJECTION, SOLUTION INTRAMUSCULAR; INTRAVENOUS at 21:53

## 2017-01-08 RX ADMIN — PROMETHAZINE HYDROCHLORIDE 25 MG: 25 INJECTION INTRAMUSCULAR; INTRAVENOUS at 21:53

## 2017-01-08 RX ADMIN — SODIUM CHLORIDE 1000 ML: 9 INJECTION, SOLUTION INTRAVENOUS at 21:23

## 2017-01-08 ASSESSMENT — ENCOUNTER SYMPTOMS
BRUISES/BLEEDS EASILY: 1
VOMITING: 0
NAUSEA: 1
CONSTITUTIONAL NEGATIVE: 1

## 2017-01-08 NOTE — ED AVS SNAPSHOT
[facility]    [address]     [place] [address]    Phone:  [phone]    Fax:  [phone]                                       Katy Islas   MRN: [id number]    Department:  [facility]   Date of Visit:  1/8/2017           Patient Information     Date Of Birth          1990        Your diagnoses for this visit were:     Bruising        You were seen by Flori Del Toro, NATA CNP.      Follow-up Information     Follow up with Aura Rosario PA-C In 1 week.    Specialty:  Physician Assistant - Medical    Contact information:    [facility]  [address]  [facility] [address]  [phone]          Follow up with [facility].    Specialty:  EMERGENCY MEDICINE    Why:  If symptoms worsen    Contact information:    [address]   [place] [address] [phone]    Additional information:    From [address]: Exit at [address] on south side of [place]. Turn right on [address]. Turn left at stoplight on [address]. [facility] will be in view two blocks ahead      Future Appointments        Provider Department Dept Phone Center    1/9/2017 2:00 PM Bob Monson PA-C [facility] [phone] [facility]    1/13/2017 12:00 PM Aura DIAZ. LOVE Rosario; [facility] ROOM 1ST FLOOR [facility] [phone] [place]    2/1/2017 1:30 PM SHAYNA Matthew [facility] [phone] [place]    2/1/2017 2:15 PM Vargas Langley MD, MD [facility] [phone] [facility]      24 Hour Appointment Hotline       To make an appointment at any [facility], call [phone] ([phone]). If you don't have a family doctor or clinic, we will help you find one. [facility] are conveniently located to serve the needs of you and your family.              Review of your medicines      Our records show that you are taking the medicines listed below. If these are incorrect, please call your family doctor or clinic.        Dose / Directions Last dose taken    albuterol 108 (90 BASE) MCG/ACT Inhaler   Commonly known as:  PROAIR HFA/PROVENTIL HFA/VENTOLIN HFA   Dose:  2 puff   Quantity:  3 Inhaler        Inhale 2 puffs into the lungs every 6 hours as needed for shortness of breath / dyspnea or wheezing   Refills:  1        ALLEGRA 180 MG tablet   Generic drug:  fexofenadine        Take by mouth daily   Refills:  0        ALPRAZolam 0.5 MG tablet   Commonly known as:  XANAX   Dose:  0.5 mg   Quantity:  30 tablet        Take 1 tablet (0.5 mg) by mouth 3 times daily as needed for anxiety   Refills:  0        ARIPiprazole 2 MG tablet   Commonly known as:  ABILIFY   Dose:  2 mg   Quantity:  30 tablet        Take 1 tablet (2 mg) by mouth At Bedtime   Refills:  1        busPIRone 10 MG tablet   Commonly known as:  BUSPAR   Quantity:  25 tablet        1 tablet twice a day for 1 week then 1/2 tablet twice a day for 1 week, then can stop   Refills:  0        cyclobenzaprine 10 MG tablet   Commonly known as:  FLEXERIL   Dose:  10 mg   Quantity:  60 tablet        Take 1 tablet (10 mg) by mouth 2 times daily as needed for muscle spasms or other (back pain)   Refills:  3        dexlansoprazole 60 MG Cpdr CR capsule   Commonly known as:  DEXILANT   Dose:  60 mg   Quantity:  30 capsule        Take 1 capsule (60 mg) by mouth daily   Refills:  3        dicyclomine 20 MG tablet   Commonly known as:  BENTYL   Dose:  20 mg   Quantity:  30 tablet        Take 1 tablet (20 mg) by mouth 4 times daily as needed (ABDOMINAL CRAMPING) AS NEEDED FOR CRAMPING   Refills:  0        DULoxetine 60 MG EC capsule   Commonly known as:  CYMBALTA   Dose:  60 mg   Quantity:  90 capsule        Take 1 capsule (60 mg) by mouth daily   Refills:  3        EPINEPHrine 0.3 MG/0.3ML injection   Dose:  0.3 mg    Quantity:  0.6 mL        Inject 0.3 mLs (0.3 mg) into the muscle once as needed for anaphylaxis   Refills:  1        FLEX-A-MIN JOINT FLEX PO        Take by mouth daily   Refills:  0        fluticasone 50 MCG/ACT spray   Commonly known as:  FLONASE   Dose:  2 spray   Quantity:  1 Package        Spray 2 sprays into both nostrils daily   Refills:  5        fluticasone-salmeterol 100-50 MCG/DOSE diskus inhaler   Commonly known as:  ADVAIR   Dose:  1 puff   Quantity:  1 Inhaler        Inhale 1 puff into the lungs 2 times daily   Refills:  5        gabapentin 300 MG capsule   Commonly known as:  NEURONTIN   Dose:  300 mg   Quantity:  270 capsule        Take 1 capsule (300 mg) by mouth 3 times daily   Refills:  1        HYDROcodone-acetaminophen 5-325 MG per tablet   Commonly known as:  NORCO   Dose:  1-2 tablet   Quantity:  12 tablet        Take 1-2 tablets by mouth every 4 hours as needed for moderate to severe pain   Refills:  0        LORazepam 0.5 MG tablet   Commonly known as:  ATIVAN   Dose:  0.5 mg   Quantity:  10 tablet        Take 1 tablet (0.5 mg) by mouth every 8 hours as needed for anxiety   Refills:  0        Multi-vitamin Tabs tablet   Dose:  1 tablet   Generic drug:  multivitamin, therapeutic with minerals        Take 1 tablet by mouth daily.   Refills:  0        naproxen 500 MG tablet   Commonly known as:  NAPROSYN   Dose:  500 mg   Quantity:  180 tablet        Take 1 tablet (500 mg) by mouth 2 times daily as needed for moderate pain   Refills:  1        order for DME   Quantity:  1 Units        Equipment being ordered: Nebulizer   Refills:  0        polyethylene glycol powder   Commonly known as:  MIRALAX   Dose:  1 capful   Quantity:  527 g        Take 17 g (1 capful) by mouth daily   Refills:  0        promethazine 25 MG tablet   Commonly known as:  PHENERGAN        Take by mouth every 6 hours as needed for nausea   Refills:  0        sucralfate 1 GM tablet   Commonly known as:  CARAFATE        Take  by mouth 4 times daily   Refills:  0        Vitamin D-3 5000 UNITS Tabs        Take by mouth daily   Refills:  0                Procedures and tests performed during your visit     CBC with platelets differential    Comprehensive metabolic panel    HCG qualitative urine    INR    Partial thromboplastin time    Peripheral IV catheter    UA with Microscopic      Orders Needing Specimen Collection     None      Pending Results     No orders found from 1/7/2017 to 1/9/2017.            Pending Culture Results     No orders found from 1/7/2017 to 1/9/2017.            Thank you for choosing [facility]       Thank you for choosing [facility] for your care. Our goal is always to provide you with excellent care. Hearing back from our patients is one way we can continue to improve our services. Please take a few minutes to complete the written survey that you may receive in the mail after you visit with us. Thank you!        [employer] Information     [employer] gives you secure access to your electronic health record. If you see a primary care provider, you can also send messages to your care team and make appointments. If you have questions, please call your primary care clinic.  If you do not have a primary care provider, please call [phone] and they will assist you.        Care EveryWhere ID     This is your Care EveryWhere ID. This could be used by other organizations to access your [facility] medical records  [id number]        After Visit Summary       This is your record. Keep this with you and show to your community pharmacist(s) and doctor(s) at your next visit.

## 2017-01-09 ENCOUNTER — MYC MEDICAL ADVICE (OUTPATIENT)
Dept: FAMILY MEDICINE | Facility: OTHER | Age: 27
End: 2017-01-09

## 2017-01-09 ENCOUNTER — OFFICE VISIT (OUTPATIENT)
Dept: FAMILY MEDICINE | Facility: OTHER | Age: 27
End: 2017-01-09
Payer: MEDICARE

## 2017-01-09 VITALS
WEIGHT: 150.8 LBS | TEMPERATURE: 99.5 F | SYSTOLIC BLOOD PRESSURE: 145 MMHG | HEART RATE: 85 BPM | DIASTOLIC BLOOD PRESSURE: 89 MMHG | BODY MASS INDEX: 28.29 KG/M2 | RESPIRATION RATE: 16 BRPM | OXYGEN SATURATION: 99 %

## 2017-01-09 DIAGNOSIS — E55.9 VITAMIN D DEFICIENCY: Primary | ICD-10-CM

## 2017-01-09 DIAGNOSIS — T14.8XXA BRUISING: ICD-10-CM

## 2017-01-09 DIAGNOSIS — R53.83 OTHER FATIGUE: ICD-10-CM

## 2017-01-09 DIAGNOSIS — F43.23 ADJUSTMENT DISORDER WITH MIXED ANXIETY AND DEPRESSED MOOD: Primary | ICD-10-CM

## 2017-01-09 DIAGNOSIS — K50.10 CROHN'S COLITIS, WITHOUT COMPLICATIONS (H): ICD-10-CM

## 2017-01-09 LAB — DEPRECATED CALCIDIOL+CALCIFEROL SERPL-MC: 19 UG/L (ref 20–75)

## 2017-01-09 PROCEDURE — 99214 OFFICE O/P EST MOD 30 MIN: CPT | Performed by: PHYSICIAN ASSISTANT

## 2017-01-09 PROCEDURE — 82746 ASSAY OF FOLIC ACID SERUM: CPT | Performed by: PHYSICIAN ASSISTANT

## 2017-01-09 PROCEDURE — 83550 IRON BINDING TEST: CPT | Performed by: PHYSICIAN ASSISTANT

## 2017-01-09 PROCEDURE — 82728 ASSAY OF FERRITIN: CPT | Performed by: PHYSICIAN ASSISTANT

## 2017-01-09 PROCEDURE — 82607 VITAMIN B-12: CPT | Performed by: PHYSICIAN ASSISTANT

## 2017-01-09 PROCEDURE — 84443 ASSAY THYROID STIM HORMONE: CPT | Performed by: PHYSICIAN ASSISTANT

## 2017-01-09 PROCEDURE — T1013 SIGN LANG/ORAL INTERPRETER: HCPCS | Mod: U3 | Performed by: PHYSICIAN ASSISTANT

## 2017-01-09 PROCEDURE — 36415 COLL VENOUS BLD VENIPUNCTURE: CPT | Performed by: PHYSICIAN ASSISTANT

## 2017-01-09 PROCEDURE — 83540 ASSAY OF IRON: CPT | Performed by: PHYSICIAN ASSISTANT

## 2017-01-09 RX ORDER — LORAZEPAM 0.5 MG/1
0.5 TABLET ORAL EVERY 8 HOURS PRN
Qty: 30 TABLET | Refills: 0 | Status: SHIPPED | OUTPATIENT
Start: 2017-01-09 | End: 2017-01-30

## 2017-01-09 RX ORDER — ERGOCALCIFEROL 1.25 MG/1
50000 CAPSULE, LIQUID FILLED ORAL
Qty: 8 CAPSULE | Refills: 0 | Status: SHIPPED | OUTPATIENT
Start: 2017-01-09 | End: 2017-02-28

## 2017-01-09 NOTE — MR AVS SNAPSHOT
After Visit Summary   1/9/2017    Katy Islas    MRN: [id number]           Patient Information     Date Of Birth          1990        Visit Information        Provider Department      1/9/2017 2:00 PM Loida Quinonez; Aura Rosario PA-C [facility]        Today's Diagnoses     Adjustment disorder with mixed anxiety and depressed mood    -  1     Other fatigue         Bruising         Crohn's colitis, without complications (H)           Care Instructions    - Recheck 2-3 weeks         Follow-ups after your visit        Your next 10 appointments already scheduled     Jan 13, 2017 12:00 PM   Office Visit with Aura Rosario PA-C, STEVO Pennington PROC ROOM 1ST FLOOR [facility] ([facility])    [address] [id number] [phone]           Bring a current list of meds and any records pertaining to this visit.  For Physicals, please bring immunization records and any forms needing to be filled out.  Please arrive 10 minutes early to complete paperwork.            Feb 01, 2017  1:30 PM   New Visit with SHAYNA Matthew   [facility] ([facility]    [address]  [facility] [id number] [phone]            Feb 01, 2017  2:15 PM   New Visit with Vargas Langley MD   [facility] ([facility])    [address]  [address]  [facility] [id number] [phone]              Who to contact     If you have questions or need follow up information about today's clinic visit or your schedule please contact [facility] directly at [phone].  Normal or non-critical lab and imaging results will be communicated to you by MyChart, letter or phone within 4 business days after the clinic has received the results. If you do not hear from us within 7 days, please contact the clinic through  [employer] or phone. If you have a critical or abnormal lab result, we will notify you by phone as soon as possible.  Submit refill requests through [employer] or call your pharmacy and they will forward the refill request to us. Please allow 3 business days for your refill to be completed.          Additional Information About Your Visit        [employer] Information     [employer] gives you secure access to your electronic health record. If you see a primary care provider, you can also send messages to your care team and make appointments. If you have questions, please call your primary care clinic.  If you do not have a primary care provider, please call [phone] and they will assist you.        Care EveryWhere ID     This is your Care EveryWhere ID. This could be used by other organizations to access your [place] medical records  [id number]        Your Vitals Were     Pulse Temperature Respirations Pulse Oximetry          85 99.5  F (37.5  C) (Oral) 16 99%         Blood Pressure from Last 3 Encounters:   01/09/17 145/89   01/08/17 143/89   01/06/17 132/82    Weight from Last 3 Encounters:   01/09/17 150 lb 12.8 oz (68.402 kg)   01/08/17 140 lb (63.504 kg)   01/06/17 154 lb (69.854 kg)              We Performed the Following     Ferritin     Folate     Iron and iron binding capacity     TSH with free T4 reflex     Vitamin B12          Where to get your medicines      Some of these will need a paper prescription and others can be bought over the counter.  Ask your nurse if you have questions.     Bring a paper prescription for each of these medications    - LORazepam 0.5 MG tablet       Primary Care Provider Office Phone # Fax #    Aura Rosario PA-C [phone]       [facility] [address]  [facility] [address]        Thank you!     Thank you for choosing [facility]  for your care. Our goal is always to provide you with excellent care. Hearing  back from our patients is one way we can continue to improve our services. Please take a few minutes to complete the written survey that you may receive in the mail after your visit with us. Thank you!             Your Updated Medication List - Protect others around you: Learn how to safely use, store and throw away your medicines at www.disposemymeds.org.          This list is accurate as of: 1/9/17  2:45 PM.  Always use your most recent med list.                   Brand Name Dispense Instructions for use    albuterol 108 (90 BASE) MCG/ACT Inhaler    PROAIR HFA/PROVENTIL HFA/VENTOLIN HFA    3 Inhaler    Inhale 2 puffs into the lungs every 6 hours as needed for shortness of breath / dyspnea or wheezing       ALLEGRA 180 MG tablet   Generic drug:  fexofenadine      Take by mouth daily       ALPRAZolam 0.5 MG tablet    XANAX    30 tablet    Take 1 tablet (0.5 mg) by mouth 3 times daily as needed for anxiety       ARIPiprazole 2 MG tablet    ABILIFY    30 tablet    Take 1 tablet (2 mg) by mouth At Bedtime       busPIRone 10 MG tablet    BUSPAR    25 tablet    1 tablet twice a day for 1 week then 1/2 tablet twice a day for 1 week, then can stop       cyclobenzaprine 10 MG tablet    FLEXERIL    60 tablet    Take 1 tablet (10 mg) by mouth 2 times daily as needed for muscle spasms or other (back pain)       dexlansoprazole 60 MG Cpdr CR capsule    DEXILANT    30 capsule    Take 1 capsule (60 mg) by mouth daily       dicyclomine 20 MG tablet    BENTYL    30 tablet    Take 1 tablet (20 mg) by mouth 4 times daily as needed (ABDOMINAL CRAMPING) AS NEEDED FOR CRAMPING       DULoxetine 60 MG EC capsule    CYMBALTA    90 capsule    Take 1 capsule (60 mg) by mouth daily       EPINEPHrine 0.3 MG/0.3ML injection     0.6 mL    Inject 0.3 mLs (0.3 mg) into the muscle once as needed for anaphylaxis       FLEX-A-MIN JOINT FLEX PO      Take by mouth daily       fluticasone 50 MCG/ACT spray    FLONASE    1 Package    Spray 2 sprays into  both nostrils daily       fluticasone-salmeterol 100-50 MCG/DOSE diskus inhaler    ADVAIR    1 Inhaler    Inhale 1 puff into the lungs 2 times daily       gabapentin 300 MG capsule    NEURONTIN    270 capsule    Take 1 capsule (300 mg) by mouth 3 times daily       HYDROcodone-acetaminophen 5-325 MG per tablet    NORCO    12 tablet    Take 1-2 tablets by mouth every 4 hours as needed for moderate to severe pain       LORazepam 0.5 MG tablet    ATIVAN    30 tablet    Take 1 tablet (0.5 mg) by mouth every 8 hours as needed for anxiety       Multi-vitamin Tabs tablet   Generic drug:  multivitamin, therapeutic with minerals      Take 1 tablet by mouth daily.       naproxen 500 MG tablet    NAPROSYN    180 tablet    Take 1 tablet (500 mg) by mouth 2 times daily as needed for moderate pain       order for DME     1 Units    Equipment being ordered: Nebulizer       polyethylene glycol powder    MIRALAX    527 g    Take 17 g (1 capful) by mouth daily       promethazine 25 MG tablet    PHENERGAN     Take by mouth every 6 hours as needed for nausea       sucralfate 1 GM tablet    CARAFATE     Take by mouth 4 times daily       Vitamin D-3 5000 UNITS Tabs      Take by mouth daily
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
## 2017-01-09 NOTE — NURSING NOTE
"Chief Complaint   Patient presents with     Hospital F/U     Bruising       Initial /89 mmHg  Pulse 85  Temp(Src) 99.5  F (37.5  C) (Oral)  Resp 16  Ht   Wt 150 lb 12.8 oz (68.402 kg)  SpO2 99% Estimated body mass index is 28.29 kg/(m^2) as calculated from the following:    Height as of 1/6/17: 5' 1.22\" (1.555 m).    Weight as of this encounter: 150 lb 12.8 oz (68.402 kg).  BP completed using cuff size: regular  "

## 2017-01-09 NOTE — ED PROVIDER NOTES
History   No chief complaint on file.    The history is provided by the patient and a parent. A  was used (Patient's mother).     Katy Islas is a 26 year old female who presents to the emergency department for evaluation of abnormal bruising. The patient first noticed small bruises under her left arm and to her inner right thigh yesterday. Today the bruises have worsened and become more painful. Her mother tried to get her into the clinic tomorrow, but they were instructed to come into the ED tonight. The patient reports that she has had associated nausea since yesterday. She denies any recent falls or traumas. She currently receives cortisone injections in her back. She denies any ibuprofen or aspirin use. She was started on Abilify and Cymbalta last week but denies any other new medications. The patient denies history of bleeding disorders.     I have reviewed the Medications, Allergies, Past Medical and Surgical History, and Social History in the Epic system.    Patient Active Problem List   Diagnosis     Hearing loss     Allergic rhinitis     Migraine     Benign neoplasm of skin of lower limb, including hip     Dysmenorrhea     Crohn's colitis (H)     Fibromyalgia     CARDIOVASCULAR SCREENING; LDL GOAL LESS THAN 160     Mild major depression (H)     Anxiety     Leg pain     Chronic pain     Low back pain     Drug-seeking behavior- See FYI notes from Dr Barton     Palpitations     Oral thrush     Poor appetite     Nausea and vomiting     Weight loss     Abdominal pain     Tension type headache     Elevated blood pressure reading without diagnosis of hypertension     Grand mal seizure disorder (H)     Right knee pain     Health Care Home     Bipolar disorder (H)     Marijuana abuse     Adjustment disorder with mixed anxiety and depressed mood     Controlled substance agreement terminated     Herpes simplex virus infection     Gastroesophageal reflux disease without esophagitis     Bee  sting allergy     Mild persistent asthma without complication     Chronic bilateral low back pain with left-sided sciatica     Vitamin D deficiency     Atypical mole     Past Surgical History   Procedure Laterality Date      ugi endoscopy, simple exam  7/00, 10/00     mild chronic esophagitis and duodenitis, neg H pylori     Hc eeg awake and sleep       abnormal     Hc colonoscopy thru stoma, diagnostic  10/00     normal     Hc mri brain w/o contrast  12/00     normal     Hc colonoscopy thru stoma with biopsy  10/29/2003     Impression is that of normal appearing colonoscopy, without evidence of rectal bleeding.      ugi endoscopy, simple exam  01/20/2005     Esophagogastroduodenoscopy, colonoscopy with biopsies.  [facility]     Colonoscopy  7/1/2009     [facility]     Hc ugi endoscopy, simple exam  7/1/2009     [facility], [facility].     C removal gallbladder  8/5/2009     [facility], [facility].     Hc colonoscopy thru stoma, diagnostic  Oct 2009     Dr López- normal      ugi endoscopy, simple exam  11/11/2009     attempted upper GI, pt. could not tolerate procedure:[facility]      ugi endoscopy diag w biopsy  11/11/09     Normal esophagus     Orthopedic surgery  October 19,2011     diskectomy L4-L5       Family History   Problem Relation Age of Onset     GASTROINTESTINAL DISEASE Brother      severe Crohn's     Neurologic Disorder Brother      Seizures post head injury     Depression Brother      Substance Abuse Brother      Genitourinary Problems Father      kidney stones     DIABETES Father      HEART DISEASE Father      Open heart surgery     Breast Cancer Maternal Grandmother      CEREBROVASCULAR DISEASE Paternal Grandmother      CANCER Maternal Grandfather      Lung     Cardiovascular Paternal Grandfather      Heart Attack     Substance Abuse Mother        Social History   Substance Use Topics     Smoking status: Former Smoker      Types: Cigarettes     Quit date: 09/19/2016     Smokeless tobacco: Never Used      Comment: no smokers in household     Alcohol Use: 0.6 oz/week     1 Cans of beer per week        Immunization History   Administered Date(s) Administered     DPT 1989, 1989, 1989, 07/20/1990, 08/11/1994     Hepatitis B 07/01/2000, 10/01/2000, 01/10/2001     Human Papilloma Virus 01/07/2009, 04/28/2009, 07/10/2009     Influenza (IIV3) 11/11/1999, 01/10/2001, 11/01/2006, 11/20/2007     MMR 07/15/1990, 06/27/2001     Mantoux 03/08/2010     Meningococcal (Menactra ) 11/01/2006     OPV 1989, 1989, 07/20/1990, 08/11/1994     TD (ADULT, 7+) 06/27/2001     TDAP (ADACEL AGES 11-64) 08/16/2011          Allergies   Allergen Reactions     Baclofen      hives     Bees Hives, Swelling and Difficulty breathing     Caffeine      Contrast Dye      Hives,   Updated 5/10/2016 CT Contrast.     Diazepam      Other reaction(s): Other - Describe In Comment Field  Becomes aggressive and angry     Iodine Hives     Methocarbamol Swelling     Metoclopramide      Other reaction(s): Tremors  LW Reaction: shaking/sweating     Midazolam      Other reaction(s): Agitation     Monosodium Glutamate      Morphine Other (See Comments)     Difficulty with urination     Other (Do Not Use) Other (See Comments)     Xanaflex- pt becomes disoriented and loses bladder control     Reglan [Metoclopramide Hcl] Other (See Comments)     shaking     Soma [Carisoprodol] Visual Disturbance     Sleep walking     Tizanidine      Topamax Other (See Comments)     Topamax [Topiramate] Nausea     Tingling  GI/Vomit     Tramadol      Severe Headache, Seizure Risk     Tylenol W/Codeine [Acetaminophen-Codeine] Nausea and Itching     Tylenol 3     Valium Other (See Comments)     Becomes aggressive and angry     Versed Other (See Comments)     Zolmitriptan      Makes face feel like its twitching     Droperidol Anxiety     Flu Virus Vaccine Rash      Arm swelling        Current Outpatient Prescriptions   Medication Sig Dispense Refill     LORazepam (ATIVAN) 0.5 MG tablet Take 1 tablet (0.5 mg) by mouth every 8 hours as needed for anxiety 10 tablet 0     HYDROcodone-acetaminophen (NORCO) 5-325 MG per tablet Take 1-2 tablets by mouth every 4 hours as needed for moderate to severe pain 12 tablet 0     polyethylene glycol (MIRALAX) powder Take 17 g (1 capful) by mouth daily 527 g 0     busPIRone (BUSPAR) 10 MG tablet 1 tablet twice a day for 1 week then 1/2 tablet twice a day for 1 week, then can stop 25 tablet 0     cyclobenzaprine (FLEXERIL) 10 MG tablet Take 1 tablet (10 mg) by mouth 2 times daily as needed for muscle spasms or other (back pain) 60 tablet 3     DULoxetine (CYMBALTA) 60 MG EC capsule Take 1 capsule (60 mg) by mouth daily 90 capsule 3     ARIPiprazole (ABILIFY) 2 MG tablet Take 1 tablet (2 mg) by mouth At Bedtime 30 tablet 1     naproxen (NAPROSYN) 500 MG tablet Take 1 tablet (500 mg) by mouth 2 times daily as needed for moderate pain 180 tablet 1     ALPRAZolam (XANAX) 0.5 MG tablet Take 1 tablet (0.5 mg) by mouth 3 times daily as needed for anxiety 30 tablet 0     gabapentin (NEURONTIN) 300 MG capsule Take 1 capsule (300 mg) by mouth 3 times daily 270 capsule 1     dexlansoprazole (DEXILANT) 60 MG CPDR CR capsule Take 1 capsule (60 mg) by mouth daily 30 capsule 3     fluticasone-salmeterol (ADVAIR) 100-50 MCG/DOSE diskus inhaler Inhale 1 puff into the lungs 2 times daily 1 Inhaler 5     sucralfate (CARAFATE) 1 GM tablet Take by mouth 4 times daily       promethazine (PHENERGAN) 25 MG tablet Take by mouth every 6 hours as needed for nausea       EPINEPHrine (EPIPEN) 0.3 MG/0.3ML injection Inject 0.3 mLs (0.3 mg) into the muscle once as needed for anaphylaxis 0.6 mL 1     dicyclomine (BENTYL) 20 MG tablet Take 1 tablet (20 mg) by mouth 4 times daily as needed (ABDOMINAL CRAMPING) AS NEEDED FOR CRAMPING 30 tablet 0     albuterol (PROAIR HFA, PROVENTIL HFA,  VENTOLIN HFA) 108 (90 BASE) MCG/ACT inhaler Inhale 2 puffs into the lungs every 6 hours as needed for shortness of breath / dyspnea or wheezing 3 Inhaler 1     fluticasone (FLONASE) 50 MCG/ACT nasal spray Spray 2 sprays into both nostrils daily 1 Package 5     ORDER FOR DME Equipment being ordered: Nebulizer 1 Units 0     Cholecalciferol (VITAMIN D-3) 5000 UNITS TABS Take by mouth daily       Misc Natural Products (FLEX-A-MIN JOINT FLEX PO) Take by mouth daily       fexofenadine (ALLEGRA) 180 MG tablet Take by mouth daily       Multiple Vitamin (MULTI-VITAMIN) per tablet Take 1 tablet by mouth daily.           Review of Systems   Constitutional: Negative.    Gastrointestinal: Positive for nausea. Negative for vomiting.   Hematological: Bruises/bleeds easily.   All other systems reviewed and are negative.      Physical Exam   BP: (!) 145/104 mmHg  Pulse: 89  Temp: 98  F (36.7  C)  Resp: 16  Weight: 63.504 kg (140 lb)  SpO2: 95 %  Physical Exam   Constitutional: No distress.   Cardiovascular: Normal rate, regular rhythm and normal heart sounds.    Pulmonary/Chest: Effort normal and breath sounds normal.   Abdominal: Soft. There is no tenderness.   Skin: Skin is warm and dry. Ecchymosis noted. She is not diaphoretic.   Several small areas of ecchymosis along left lateral side (just above the axilla and down left arm) and to the right inner thigh   Nursing note and vitals reviewed.      ED Course   Procedures      Results for orders placed or performed during the hospital encounter of 01/08/17 (from the past 24 hour(s))   CBC with platelets differential   Result Value Ref Range    WBC 7.4 4.0 - 11.0 10e9/L    RBC Count 4.14 3.8 - 5.2 10e12/L    Hemoglobin 14.0 11.7 - 15.7 g/dL    Hematocrit 40.9 35.0 - 47.0 %    MCV 99 78 - 100 fl    MCH 33.8 (H) 26.5 - 33.0 pg    MCHC 34.2 31.5 - 36.5 g/dL    RDW 12.6 10.0 - 15.0 %    Platelet Count 275 150 - 450 10e9/L    Diff Method Automated Method     % Neutrophils 66.9 %    %  Lymphocytes 19.0 %    % Monocytes 12.5 %    % Eosinophils 0.8 %    % Basophils 0.3 %    % Immature Granulocytes 0.5 %    Absolute Neutrophil 4.9 1.6 - 8.3 10e9/L    Absolute Lymphocytes 1.4 0.8 - 5.3 10e9/L    Absolute Monocytes 0.9 0.0 - 1.3 10e9/L    Absolute Eosinophils 0.1 0.0 - 0.7 10e9/L    Absolute Basophils 0.0 0.0 - 0.2 10e9/L    Abs Immature Granulocytes 0.0 0 - 0.4 10e9/L   INR   Result Value Ref Range    INR 0.91 0.86 - 1.14   Partial thromboplastin time   Result Value Ref Range    PTT 25 22 - 37 sec   Comprehensive metabolic panel   Result Value Ref Range    Sodium 146 (H) 133 - 144 mmol/L    Potassium 4.1 3.4 - 5.3 mmol/L    Chloride 107 94 - 109 mmol/L    Carbon Dioxide 31 20 - 32 mmol/L    Anion Gap 8 3 - 14 mmol/L    Glucose 96 70 - 99 mg/dL    Urea Nitrogen 21 7 - 30 mg/dL    Creatinine 0.74 0.52 - 1.04 mg/dL    GFR Estimate >90  Non  GFR Calc   >60 mL/min/1.7m2    GFR Estimate If Black >90   GFR Calc   >60 mL/min/1.7m2    Calcium 9.6 8.5 - 10.1 mg/dL    Bilirubin Total 0.2 0.2 - 1.3 mg/dL    Albumin 4.3 3.4 - 5.0 g/dL    Protein Total 7.7 6.8 - 8.8 g/dL    Alkaline Phosphatase 67 40 - 150 U/L    ALT 41 0 - 50 U/L    AST 25 0 - 45 U/L   UA with Microscopic   Result Value Ref Range    Color Urine Yellow     Appearance Urine Clear     Glucose Urine Negative NEG mg/dL    Bilirubin Urine Negative NEG    Ketones Urine Negative NEG mg/dL    Specific Gravity Urine 1.015 1.003 - 1.035    pH Urine 7.0 5.0 - 7.0 pH    Protein Albumin Urine Negative NEG mg/dL    Urobilinogen Urine 0.2 0.2 - 1.0 EU/dL    Nitrite Urine Negative NEG    Blood Urine Negative NEG    Leukocyte Esterase Urine Negative NEG    Source Unspecified Urine     WBC Urine O - 2 0 - 2 /HPF    RBC Urine O - 2 0 - 2 /HPF    Squamous Epithelial /LPF Urine Moderate (A) FEW /LPF    Renal Tub Epi Few (A) NEG /HPF    Bacteria Urine Few (A) NEG /HPF    Mucous Urine Present (A) NEG /LPF   HCG qualitative urine   Result  Value Ref Range    HCG Qual Urine Negative NEG     *Note: Due to a large number of results and/or encounters for the requested time period, some results have not been displayed. A complete set of results can be found in Results Review.     Medications   0.9% sodium chloride BOLUS (0 mLs Intravenous Stopped 1/8/17 2216)   promethazine (PHENERGAN) IV injection 25 mg (25 mg Intravenous Given 1/8/17 2153)   diphenhydrAMINE (BENADRYL) injection 25 mg (25 mg Intravenous Given 1/8/17 2153)         Assessments & Plan (with Medical Decision Making)  Katy is a 26-year-old female with a history of hearing loss, fibromyalgia, anxiety, chronic pain, bipolar disorder, drug seeking behavior, and Crohn's who presents to the emergency department today with her mom for concerns of bruising to left upper arm and right inner thigh that she noticed yesterday and has been progressively worse since that time.  Patient denies any trauma, she denies any known history of blood disorders or clotting disorders.  Patient denies any excessive use of NSAIDs, she takes one naproxen at nighttime. Please refer to HPI and focused exam.  Patient's mother is here at bedside and is signing for patient who also reads lips and is able to verbalize.  Patient's exam except for her bruising is unremarkable.  She is complaining of nausea since yesterday and reports little fluid intake since that time.  Peripheral IV was established and patient was given a liter of fluid here with Benadryl and Phenergan for her nausea as requested by patient.  CBC is unremarkable with platelets of 275, INR and PTT are within normal limits.  Comprehensive metabolic panel and urinalysis are negative for any acute findings, urine pregnancy is negative as well.  During patient's stay here she was requesting IV pain medications secondary to the pain caused by her bruising, patient was instructed she can take her medication at home.  Patient was instructed to follow up in clinic in  1 week, reasons to return to the emergency department were discussed.  Patient and her mom are agreeable to plan of care and questions were answered prior to discharge.    Patient discharged from the emergency department with her mom in stable condition.       I have reviewed the nursing notes.    I have reviewed the findings, diagnosis, plan and need for follow up with the patient.    Discharge Medication List as of 1/8/2017 10:17 PM          Final diagnoses:   Bruising     This document serves as a record of services personally performed by Flori Del Toro AP*. It was created on their behalf by Katy Wheeler, a trained medical scribe. The creation of this record is based on the provider's personal observations and the statements of the patient. This document has been checked and approved by the attending provider.   Note: Chart documentation done in part with Dragon Voice Recognition software. Although reviewed after completion, some word and grammatical errors may remain.  1/8/2017   [facility] EMERGENCY DEPARTMENT      Flori Del Toro, NATA CNP  01/09/17 0058

## 2017-01-09 NOTE — ED NOTES
Pt here with bruising under arms and in groin, noticed today after showering.  Had cortisone shots in back last week, but has not had a reaction to them in the past.  Medication reconciliation not finished in triage. Primary nurse will need to address before admission.

## 2017-01-09 NOTE — TELEPHONE ENCOUNTER
"Call Type: Triage Call    Presenting Problem: \"My daughter has suddenly developed bruises on  her chest, left arm, and right thigh.\" Bruises are about 3 inches,  in size. Bruises are dark purple-colored. Denies any injury, falls,  or bites. Denies difficulty breathing.  Triage Note:  Guideline Title: Bruises (Pediatric) ; Bruises (Pediatric)  Recommended Disposition: See Provider within 4 hours  Original Inclination: Wanted to speak with a nurse  Override Disposition: See ED Immediately  Intended Action: Go to Hospital / ED  Physician Contacted: No  [1] Not caused by an injury AND [2] 5 or more bruises now ?  YES  Child sounds very sick or weak to the triager ? NO  Bruises with fever ? NO  Tiny bruises of unknown cause ? NO  Unexplained bleeding from another site (e.g. gums or nose) as well ? NO  Sounds like a life-threatening emergency to the triager ? NO  Bruises on forehead, head or face ? NO  Suspicious history for the injury (especially if not yet crawling) ? NO  Physician Instructions:  Care Advice: CARE ADVICE given per Bruises (Pediatric) guideline.  CALL BACK IF: * Your child becomes worse.  SEE PHYSICIAN WITHIN 4 HOURS: * IF OFFICE WILL BE OPEN: Your child needs to  be seen within the next 3 or 4 hours. Call your doctor's office as soon as  it opens. * IF OFFICE WILL BE CLOSED: Your child needs to be seen within  the next 3 or 4 hours. A nearby Urgent Care Center is often a good source  of care. Another choice is to go to the ER. Go sooner if your child becomes  worse.  "

## 2017-01-09 NOTE — PROGRESS NOTES
Quick Note:    Savana Burns    Your vitamin D results were very low. I recommend a prescription supplement that I will send to your pharmacy. This will be 50,000 IU (one tablet) once a WEEK for 8 weeks. After 10 weeks, we will recheck your level and then I can recommend a daily supplement.     The results are attached for your review.       Zach Rosario PA-C    ______

## 2017-01-10 LAB
FERRITIN SERPL-MCNC: 42 NG/ML (ref 12–150)
FOLATE SERPL-MCNC: 16.2 NG/ML
IRON SATN MFR SERPL: 23 % (ref 15–46)
IRON SERPL-MCNC: 82 UG/DL (ref 35–180)
TIBC SERPL-MCNC: 362 UG/DL (ref 240–430)
TSH SERPL DL<=0.005 MIU/L-ACNC: 1.4 MU/L (ref 0.4–4)
VIT B12 SERPL-MCNC: 400 PG/ML (ref 193–986)

## 2017-01-11 ENCOUNTER — MYC MEDICAL ADVICE (OUTPATIENT)
Dept: FAMILY MEDICINE | Facility: OTHER | Age: 27
End: 2017-01-11

## 2017-01-27 ENCOUNTER — TRANSFERRED RECORDS (OUTPATIENT)
Dept: HEALTH INFORMATION MANAGEMENT | Facility: CLINIC | Age: 27
End: 2017-01-27

## 2017-01-30 ENCOUNTER — MYC MEDICAL ADVICE (OUTPATIENT)
Dept: FAMILY MEDICINE | Facility: OTHER | Age: 27
End: 2017-01-30

## 2017-01-30 ENCOUNTER — OFFICE VISIT (OUTPATIENT)
Dept: FAMILY MEDICINE | Facility: OTHER | Age: 27
End: 2017-01-30
Payer: MEDICARE

## 2017-01-30 VITALS
DIASTOLIC BLOOD PRESSURE: 84 MMHG | BODY MASS INDEX: 28.03 KG/M2 | WEIGHT: 149.4 LBS | TEMPERATURE: 98.9 F | RESPIRATION RATE: 16 BRPM | HEART RATE: 121 BPM | OXYGEN SATURATION: 100 % | SYSTOLIC BLOOD PRESSURE: 138 MMHG

## 2017-01-30 DIAGNOSIS — F43.23 ADJUSTMENT DISORDER WITH MIXED ANXIETY AND DEPRESSED MOOD: ICD-10-CM

## 2017-01-30 DIAGNOSIS — F33.0 MAJOR DEPRESSIVE DISORDER, RECURRENT EPISODE, MILD (H): ICD-10-CM

## 2017-01-30 DIAGNOSIS — F41.9 ANXIETY: ICD-10-CM

## 2017-01-30 DIAGNOSIS — M54.50 LEFT-SIDED LOW BACK PAIN WITHOUT SCIATICA, UNSPECIFIED CHRONICITY: ICD-10-CM

## 2017-01-30 DIAGNOSIS — G43.709 CHRONIC MIGRAINE WITHOUT AURA WITHOUT STATUS MIGRAINOSUS, NOT INTRACTABLE: Primary | ICD-10-CM

## 2017-01-30 PROCEDURE — 99215 OFFICE O/P EST HI 40 MIN: CPT | Performed by: PHYSICIAN ASSISTANT

## 2017-01-30 RX ORDER — ONDANSETRON 4 MG/1
4 TABLET, FILM COATED ORAL EVERY 8 HOURS PRN
Qty: 30 TABLET | Refills: 11 | Status: SHIPPED | OUTPATIENT
Start: 2017-01-30 | End: 2017-05-08

## 2017-01-30 RX ORDER — SUMATRIPTAN 50 MG/1
50 TABLET, FILM COATED ORAL
Qty: 18 TABLET | Refills: 3 | Status: SHIPPED | OUTPATIENT
Start: 2017-01-30 | End: 2017-02-27

## 2017-01-30 RX ORDER — HYDROCODONE BITARTRATE AND ACETAMINOPHEN 5; 325 MG/1; MG/1
1-2 TABLET ORAL
Qty: 30 TABLET | Refills: 0 | Status: SHIPPED | OUTPATIENT
Start: 2017-01-30 | End: 2017-02-13

## 2017-01-30 RX ORDER — ARIPIPRAZOLE 2 MG/1
2 TABLET ORAL AT BEDTIME
Qty: 90 TABLET | Refills: 3 | Status: SHIPPED | OUTPATIENT
Start: 2017-01-30 | End: 2017-07-27

## 2017-01-30 ASSESSMENT — PAIN SCALES - GENERAL: PAINLEVEL: MODERATE PAIN (4)

## 2017-01-30 NOTE — TELEPHONE ENCOUNTER
Pt scheduled incorrectly and needs more time.  Time held for pt and  is scheduled.  Maya Cardenas, CMA

## 2017-01-30 NOTE — TELEPHONE ENCOUNTER
Should be seen in clinic to discuss all of this.     Zach Rosario PA-C  [facility]

## 2017-01-30 NOTE — PROGRESS NOTES
"  SUBJECTIVE:                                                    Katy Islas is a 26 year old female who presents to clinic today for the following health issues:      HPI    Headache     Onset: 2 weeks ago, than a few days ago     Description:   Location: by eyes, and top of head and tight,   Character: dull pain, tight   Frequency:  3 headaches in the last 2 weeks  Duration:  For the day    Intensity: moderate    Progression of Symptoms:  Taking Imitrex helps    Accompanying Signs & Symptoms:  Stiff neck: no   Neck or upper back pain: no  Fever: no  Sinus pressure: no  Nausea or vomiting: YES  Dizziness: YES- sometyimes  Numbness: no  Weakness: no  Visual changes: YES- sensitive to light   History:   Head trauma: no  Family history of migraines: YES- mother  Previous tests for headaches: YES  Neurologist evaluations: YES  Able to do daily activities: YES- after taking medication  Wake with a headaches: YES  Do headaches wake you up: no  Daily pain medication use: YES- for back  Work/school stressors/changes: no    Precipitating factors:   Does light make it worse: YES  Does sound make it worse: no    Alleviating factors:  Does sleep help: YES    - No period since 2012, last depo was supposed to be Dec 8th but didn't do it    - Feels like it might come \"PMS\"     - Gets complete resolution with Imitrex          Therapies Tried and outcome: imitrex, zofran and Naproxyn (Aleve)      Depression and Anxiety Follow-Up    Status since last visit: Improved     Other associated symptoms:None    Complicating factors:     Significant life event: No     Current substance abuse: None    - Cymbalta - stable   - Ativan - not using in >2 weeks   - Abilify - going really well likes it   - Feels like in a good place now, happy and moods better controlled     PHQ-9 SCORE 11/7/2016 12/2/2016 12/30/2016   Total Score - - -   Total Score 8 3 16     JULIETH-7 SCORE 12/2/2016 12/30/2016 1/6/2017   Total Score - - -   Total Score 13 18 20 "        PHQ-9  English      PHQ-9   Any Language     GAD7       Back Pain Follow Up       Description:   Location of pain:  Lower left and down leg  Character of pain: sharp and dull ache  Pain radiation: Does not radiate and radiates below the knee   Since last visit, pain is:  worsened  New numbness or weakness in legs, not attributed to pain:  no     Intensity: moderate    History:   Pain interferes with job: sometimes during chores  Therapies tried without relief: chiropractor, Physical Therapy- going to day to try water therapy and steroid injection  Therapies tried with relief: naproxen, flexeril, chiropractor, muscle relaxants, Physical Therapy, rest and stretch      Accompanying Signs & Symptoms:  Risk of Fracture:  None  Risk of Cauda Equina:  None  Risk of Infection:  None  Risk of Cancer:  None  Pt also wants to talk about periods: since it has been a couple years since she has had one, started injection 2012 and last one was august 16.      - Physical therapy (water)  - Dr. Keller, goes to [place]   - Muscle tone improved, achey   - Waiting for insurance to approve surgery   - Was getting better but then last few nights got worse  - Took flexeril this AM and helped   - Dr. Crowell did back injection 1/3/17   - Percocet made her itchy        MyChart Message from Patient  Hi,  do you have any openings today?  Olga been getting headaches more often and i never had a migraine since last winter.  I think it is from hormones.  What helps me is Imitrex with zofran and just sleep it off.  I also did see the spine surgeon, dr keller and he is going to do lumbar minimal invasive surgery- a microdiscectomy if you know what I am trying to say.  First we have to wait for my insurance to approve it then we will schedule the surgery.  I have been having a lot more pain, so PT and injections are not helping anymore.  I can sign a release to this doctor.  Would you be willing to prescribe some Norco because the pain is pretty  bad this week and I am getting real frustrated, hard to sleep.  The surgeon says primary doctor takes care of medicine.   Also need more abilify this week...  thanks       Plan from 1/9/17  - Improved with occasional use of Ativan, reviewed use and side effects including sedation and addiction  - Should use ativan sparingly    - Refilled today  - Continue on Abilify, discussed not at peak yet will give it a few more weeks    - Recheck 2-3 weeks       Problem list and histories reviewed & adjusted, as indicated.  Additional history: as documented    Problem list, Medication list, Allergies, and Medical/Social/Surgical histories reviewed in [facility] and updated as appropriate.    ROS:  Constitutional, HEENT, cardiovascular, pulmonary, gi and gu systems are negative, except as otherwise noted.    OBJECTIVE:                                                    /84 mmHg  Pulse 121  Temp(Src) 98.9  F (37.2  C) (Oral)  Resp 16  Ht   Wt 149 lb 6.4 oz (67.767 kg)  SpO2 100%  Body mass index is 28.03 kg/(m^2).  GENERAL APPEARANCE: healthy, alert and no distress  EYES: Eyes grossly normal to inspection, PERRLA, conjunctivae and sclerae without injection or discharge, EOM intact   RESP: Lungs clear to auscultation - no rales, rhonchi or wheezes   CV: Regular rates and rhythm, normal S1 S2, no S3 or S4, no murmur, click or rub, no peripheral edema and peripheral pulses strong and symmetric bilaterally    MS: No musculoskeletal defects are noted and gait is age appropriate without ataxia   SKIN: No suspicious lesions or rashes, hydration status appears adeuqate with normal skin turgor   NEURO: Declined   PSYCH: Alert and oriented x3; speech- coherent , normal rate and volume; able to articulate logical thoughts, able to abstract reason, no tangential thoughts, no hallucinations or delusions, mentation appears normal, Mood is euthymic. Affect is appropriate for this mood state and bright. Thought content is free of suicidal  ideation, hallucinations, and delusions. Dress is adequate and upkept. Eye contact is good during conversation.       Diagnostic Test Results:  none      ASSESSMENT/PLAN:                                                        ICD-10-CM    1. Chronic migraine without aura without status migrainosus, not intractable G43.709    2. Major depressive disorder, recurrent episode, mild (H) F33.0    3. Anxiety F41.9    4. Adjustment disorder with mixed anxiety and depressed mood F43.23    5. Left-sided low back pain without sciatica, unspecified chronicity M54.5      1. Migraines   - Discussed likely increase due to coming off Depo shots and will in the next 4-5 months get cycle again  - No concerns if gets complete resolution from Imitrex  - Refilled Imitrex and Zofran, also has Phenergan she can use   - Monitor symptoms for worsening, should also monitor triggers   - Return to clinic if persist or worsen     2-4. Mood   - Now stable on Cymbalta & Abilify  - Hasn't used Ativan in 2 weeks, will D/C    - Refilled and discussed all medications   - Recheck every 6 months or if desires a change in medication (worsening)     5. Low back pain   -  reviewed     1/2/17 - Norco 5-325 mg #12 from [facility]      1/3/17 - Oxycodone 5-325 mg, #30 from Dr. Crowell, pain specialist in [place], did her back injection  - [facility] today to get records from back doctor   - Will give Norco to take just at night, use sparingly, reviewed use and side effects including sedation and addiction, no driving on this medication, no refills without appointment   - Continue with Flexeril, ok to refill as needed   - Patient to notify us of surgery scheduled   - Continue with physical therapy       The patient indicates understanding of these issues and agrees with the plan.    Follow up: PRN or 6 months for mood recheck    A total of 50 minutes was spent with the patient today, with greater than 50% of the visit involving counseling and coordination of  care.      Aura Rosario PA-C  [facility]

## 2017-01-30 NOTE — MR AVS SNAPSHOT
After Visit Summary   1/30/2017    Katy Islas    MRN: [id number]           Patient Information     Date Of Birth          1990        Visit Information        Provider Department      1/30/2017 3:30 PM Aura Rosario PA-C; [facility]        Today's Diagnoses     Chronic migraine without aura without status migrainosus, not intractable    -  1     Major depressive disorder, recurrent episode, mild (H)         Anxiety         Adjustment disorder with mixed anxiety and depressed mood         Left-sided low back pain without sciatica, unspecified chronicity           Care Instructions    - Recheck mood in 6 months           Follow-ups after your visit        Your next 10 appointments already scheduled     Feb 01, 2017  1:30 PM   New Visit with SHAYNA Matthew, Dayana Roldan   [facility]    [address] [place] [address] [id number] [phone]            Feb 01, 2017  2:15 PM   New Visit with Vargas Langley MD, Dayana Roldan   [facility] ([facility]    [address] [place] [address] [phone]              Who to contact     If you have questions or need follow up information about today's clinic visit or your schedule please contact [facility] directly at [phone].  Normal or non-critical lab and imaging results will be communicated to you by MyChart, letter or phone within 4 business days after the clinic has received the results. If you do not hear from us within 7 days, please contact the clinic through MyChart or phone. If you have a critical or abnormal lab result, we will notify you by phone as soon as possible.  Submit refill requests through [employer] or call your pharmacy and they will forward the refill request to us. Please allow 3 business days for your refill to be completed.          Additional  Information About Your Visit        [facility] Information     [employer] gives you secure access to your electronic health record. If you see a primary care provider, you can also send messages to your care team and make appointments. If you have questions, please call your primary care clinic.  If you do not have a primary care provider, please call [phone] and they will assist you.        Care EveryWhere ID     This is your Care EveryWhere ID. This could be used by other organizations to access your [facility] medical records  [id number]        Your Vitals Were     Pulse Temperature Respirations Pulse Oximetry          121 98.9  F (37.2  C) (Oral) 16 100%         Blood Pressure from Last 3 Encounters:   01/30/17 138/84   01/09/17 145/89   01/08/17 143/89    Weight from Last 3 Encounters:   01/30/17 149 lb 6.4 oz (67.767 kg)   01/09/17 150 lb 12.8 oz (68.402 kg)   01/08/17 140 lb (63.504 kg)              Today, you had the following     No orders found for display         Today's Medication Changes          These changes are accurate as of: 1/30/17  4:04 PM.  If you have any questions, ask your nurse or doctor.               Start taking these medicines.        Dose/Directions    ondansetron 4 MG tablet   Commonly known as:  ZOFRAN   Used for:  Chronic migraine without aura without status migrainosus, not intractable   Started by:  Aura Rosario PA-C        Dose:  4 mg   Take 1 tablet (4 mg) by mouth every 8 hours as needed for nausea   Quantity:  30 tablet   Refills:  11       SUMAtriptan 50 MG tablet   Commonly known as:  IMITREX   Used for:  Chronic migraine without aura without status migrainosus, not intractable   Started by:  Aura Rosario PA-C        Dose:  50 mg   Take 1 tablet (50 mg) by mouth at onset of headache for migraine May repeat in 2 hours. Max 8 tablets/24 hours.   Quantity:  18 tablet   Refills:  3         These medicines have changed or have updated  prescriptions.        Dose/Directions    HYDROcodone-acetaminophen 5-325 MG per tablet   Commonly known as:  NORCO   This may have changed:  when to take this   Used for:  Left-sided low back pain without sciatica, unspecified chronicity   Changed by:  Aura Rosario PA-C        Dose:  1-2 tablet   Take 1-2 tablets by mouth nightly as needed for moderate to severe pain   Quantity:  30 tablet   Refills:  0         Stop taking these medicines if you haven't already. Please contact your care team if you have questions.     busPIRone 10 MG tablet   Commonly known as:  BUSPAR   Stopped by:  Aura Rosario PA-C                Where to get your medicines      These [place] [facility] [place] [address] [place] [address]  [address] [facility], [facility] [address]     Phone:  [phone]    - ARIPiprazole 2 MG tablet  - ondansetron 4 MG tablet  - SUMAtriptan 50 MG tablet      Some of these will need a paper prescription and others can be bought over the counter.  Ask your nurse if you have questions.     Bring a paper prescription for each of these medications    - HYDROcodone-acetaminophen 5-325 MG per tablet             Primary Care Provider Office Phone # Fax #    Aura Rosario PA-C [phone]       [facility] [address] [facility] [address]  [facility] [address]        Thank you!     Thank you for choosing [facility]  for your care. Our goal is always to provide you with excellent care. Hearing back from our patients is one way we can continue to improve our services. Please take a few minutes to complete the written survey that you may receive in the mail after your visit with us. Thank you!             Your Updated Medication List - Protect others around you: Learn how to safely use, store and throw away your medicines at www.disposemymeds.org.          This list is accurate as of: 1/30/17  4:04 PM.  Always  use your most recent med list.                   Brand Name Dispense Instructions for use    albuterol 108 (90 BASE) MCG/ACT Inhaler    PROAIR HFA/PROVENTIL HFA/VENTOLIN HFA    3 Inhaler    Inhale 2 puffs into the lungs every 6 hours as needed for shortness of breath / dyspnea or wheezing       ALLEGRA 180 MG tablet   Generic drug:  fexofenadine      Take by mouth daily       ALPRAZolam 0.5 MG tablet    XANAX    30 tablet    Take 1 tablet (0.5 mg) by mouth 3 times daily as needed for anxiety       ARIPiprazole 2 MG tablet    ABILIFY    90 tablet    Take 1 tablet (2 mg) by mouth At Bedtime       cyclobenzaprine 10 MG tablet    FLEXERIL    60 tablet    Take 1 tablet (10 mg) by mouth 2 times daily as needed for muscle spasms or other (back pain)       dexlansoprazole 60 MG Cpdr CR capsule    DEXILANT    30 capsule    Take 1 capsule (60 mg) by mouth daily       dicyclomine 20 MG tablet    BENTYL    30 tablet    Take 1 tablet (20 mg) by mouth 4 times daily as needed (ABDOMINAL CRAMPING) AS NEEDED FOR CRAMPING       DULoxetine 60 MG EC capsule    CYMBALTA    90 capsule    Take 1 capsule (60 mg) by mouth daily       EPINEPHrine 0.3 MG/0.3ML injection     0.6 mL    Inject 0.3 mLs (0.3 mg) into the muscle once as needed for anaphylaxis       FLEX-A-MIN JOINT FLEX PO      Take by mouth daily       fluticasone 50 MCG/ACT spray    FLONASE    1 Package    Spray 2 sprays into both nostrils daily       fluticasone-salmeterol 100-50 MCG/DOSE diskus inhaler    ADVAIR    1 Inhaler    Inhale 1 puff into the lungs 2 times daily       gabapentin 300 MG capsule    NEURONTIN    270 capsule    Take 1 capsule (300 mg) by mouth 3 times daily       HYDROcodone-acetaminophen 5-325 MG per tablet    NORCO    30 tablet    Take 1-2 tablets by mouth nightly as needed for moderate to severe pain       LORazepam 0.5 MG tablet    ATIVAN    30 tablet    Take 1 tablet (0.5 mg) by mouth every 8 hours as needed for anxiety       Multi-vitamin Tabs tablet    Generic drug:  multivitamin, therapeutic with minerals      Take 1 tablet by mouth daily.       naproxen 500 MG tablet    NAPROSYN    180 tablet    Take 1 tablet (500 mg) by mouth 2 times daily as needed for moderate pain       ondansetron 4 MG tablet    ZOFRAN    30 tablet    Take 1 tablet (4 mg) by mouth every 8 hours as needed for nausea       order for DME     1 Units    Equipment being ordered: Nebulizer       polyethylene glycol powder    MIRALAX    527 g    Take 17 g (1 capful) by mouth daily       promethazine 25 MG tablet    PHENERGAN     Take by mouth every 6 hours as needed for nausea       sucralfate 1 GM tablet    CARAFATE     Take by mouth 4 times daily       SUMAtriptan 50 MG tablet    IMITREX    18 tablet    Take 1 tablet (50 mg) by mouth at onset of headache for migraine May repeat in 2 hours. Max 8 tablets/24 hours.       vitamin D 23154 UNIT capsule    ERGOCALCIFEROL    8 capsule    Take 1 capsule (50,000 Units) by mouth every 7 days for 8 doses       Vitamin D-3 5000 UNITS Tabs      Take by mouth daily

## 2017-01-30 NOTE — NURSING NOTE
"Chief Complaint   Patient presents with     Headache     Panel Management       Initial /84 mmHg  Pulse 121  Temp(Src) 98.9  F (37.2  C) (Oral)  Resp 16  Ht   Wt 149 lb 6.4 oz (67.767 kg)  SpO2 100% Estimated body mass index is 28.03 kg/(m^2) as calculated from the following:    Height as of 1/6/17: 5' 1.22\" (1.555 m).    Weight as of this encounter: 149 lb 6.4 oz (67.767 kg).  BP completed using cuff size: regular    "

## 2017-01-30 NOTE — TELEPHONE ENCOUNTER
Will route to CD[facility] to review on pts message.  And will mychart back with response and hold time for appt .   Maya Cardenas CMA

## 2017-01-31 ENCOUNTER — TELEPHONE (OUTPATIENT)
Dept: FAMILY MEDICINE | Facility: OTHER | Age: 27
End: 2017-01-31

## 2017-01-31 DIAGNOSIS — R11.2 INTRACTABLE VOMITING WITH NAUSEA, UNSPECIFIED VOMITING TYPE: ICD-10-CM

## 2017-01-31 DIAGNOSIS — R11.0 NAUSEA: Primary | ICD-10-CM

## 2017-01-31 DIAGNOSIS — G43.709 CHRONIC MIGRAINE WITHOUT AURA WITHOUT STATUS MIGRAINOSUS, NOT INTRACTABLE: Primary | ICD-10-CM

## 2017-01-31 RX ORDER — ONDANSETRON 4 MG/1
4-8 TABLET, ORALLY DISINTEGRATING ORAL EVERY 6 HOURS PRN
Qty: 30 TABLET | Refills: 1 | Status: SHIPPED | OUTPATIENT
Start: 2017-01-31 | End: 2017-03-21

## 2017-01-31 NOTE — TELEPHONE ENCOUNTER
Reason for call:  Form  Reason for Call:  Form, our goal is to have forms completed with 72 hours, however, some forms may require a visit or additional information.    Type of letter, form or note:  medical    Who is the form from?: [facility] (if other please explain)    Where did the form come from: form was faxed in    What clinic location was the form placed at?: [facility] - [phone]    Where the form was placed: [facility]    What number is listed as a contact on the form?: NA       Additional comments: needs more information    Call taken on 1/31/2017 at 2:57 PM by Sheryl Ruby

## 2017-02-01 ENCOUNTER — OFFICE VISIT (OUTPATIENT)
Dept: OTOLARYNGOLOGY | Facility: OTHER | Age: 27
End: 2017-02-01
Payer: MEDICARE

## 2017-02-01 ENCOUNTER — OFFICE VISIT (OUTPATIENT)
Dept: AUDIOLOGY | Facility: OTHER | Age: 27
End: 2017-02-01
Payer: MEDICARE

## 2017-02-01 VITALS — OXYGEN SATURATION: 100 % | BODY MASS INDEX: 27.95 KG/M2 | WEIGHT: 149 LBS | HEART RATE: 82 BPM

## 2017-02-01 DIAGNOSIS — H90.5 DEAFNESS CONGENITAL: Primary | ICD-10-CM

## 2017-02-01 DIAGNOSIS — H90.3 SENSORINEURAL HEARING LOSS, BILATERAL: Primary | ICD-10-CM

## 2017-02-01 DIAGNOSIS — H93.13 TINNITUS, BILATERAL: ICD-10-CM

## 2017-02-01 PROCEDURE — 92553 AUDIOMETRY AIR & BONE: CPT | Performed by: AUDIOLOGIST

## 2017-02-01 PROCEDURE — 92567 TYMPANOMETRY: CPT | Performed by: AUDIOLOGIST

## 2017-02-01 PROCEDURE — 92591 HC HEARING AID EXAM BINAURAL: CPT | Performed by: AUDIOLOGIST

## 2017-02-01 PROCEDURE — 99203 OFFICE O/P NEW LOW 30 MIN: CPT | Performed by: OTOLARYNGOLOGY

## 2017-02-01 PROCEDURE — 99207 ZZC NO CHARGE LOS: CPT | Performed by: AUDIOLOGIST

## 2017-02-01 NOTE — PROGRESS NOTES
"AUDIOLOGY REPORT    SUBJECTIVE: Katy Islas is a 26 year old female  was seen in the Audiology Clinic at [facility] on 2/01/17 to discuss concerns with hearing and functional communication difficulties. The patient was accompanied by an . 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  She reports that her hearing aids are very old and that she cannot find them.  She reports bilateral \"light\" tinnitus.  She denies pain, aural fullness, drainage, and dizziness. The patient was medically evaluated and determined to be cleared for binaural hearing aids by Dr. Vargas Langley. The patient notes difficulty with communication in a variety of listening situations.      OBJECTIVE:  Otoscopic exam indicates ears are clear of cerumen bilaterally     Pure Tone Thresholds assessed using conventional audiometry with good reliability from 250-8000 Hz bilaterally using insert earphones and circumaural headphones     RIGHT:  Profound sensorineural hearing loss     LEFT:    Profound sensorineural hearing loss    Tympanogram:    RIGHT: normal eardrum mobility    LEFT:   normal eardrum mobility    Could not test speech reception thresholds or word recognition scores due to the limits of the equipment    Patient is a hearing aid candidate.  Patient would like to move forward with a hearing aid evaluation today. Therefore, the patient was presented with different options for amplification to help aid in communication. Discussed styles, levels of technology and monaural vs. binaural fitting.     Realistic expectations regarding benefit from hearing aids discussed, patient understood that she will only receive sound awareness for mostly loud sounds, potentially some moderate level sounds.  She reports that she has previously looked into cochlear implants and is not interested at this time.    The hearing aids mutually chosen were:  Binaural " Phonak Deena V50-UP  COLOR: Beige  BATTERY SIZE: 675  EARMOLD/TIPS: full shell, silicone (patient not interested in acrylic material)    Otoscopy revealed ears are clear of cerumen bilaterally. Bilateral earmolds were taken without incident.    ASSESSMENT:   Bilateral sensorineural hearing loss    Results of hearing test were discussed with the patient, realistic expectations regarding hearing aids were also discussed.    Reviewed purchase information and warranty information with patient. The 45 day trial period was explained to patient.The patient was given a copy of the [place] [employer] consumer brochure on purchasing hearing instruments. Hearing aids ordered. Hearing aid evaluation completed.    PLAN:  Patient is scheduled to return in 2-3 weeks for a hearing aid fitting and programming. Purchase agreement will be completed on that date. Please contact this clinic with any questions or concerns.      Natalie Matthew  Audiologist  [place] License  #[id number]

## 2017-02-01 NOTE — MR AVS SNAPSHOT
After Visit Summary   2/1/2017    Katy Islas    MRN: [id number]           Patient Information     Date Of Birth          1990        Visit Information        Provider Department      2/1/2017 2:15 PM Dayana Roldan; Vargas Langley MD [facility]        Today's Diagnoses     Deafness congenital    -  1        Follow-ups after your visit        Your next 10 appointments already scheduled     Feb 21, 2017 11:00 AM   Return Visit with SHAYNA Matthew   [facility]    [address] [place] [address] [address]  [facility] [id number] [phone]              Who to contact     If you have questions or need follow up information about today's clinic visit or your schedule please contact [facility] directly at [phone].  Normal or non-critical lab and imaging results will be communicated to you by MyChart, letter or phone within 4 business days after the clinic has received the results. If you do not hear from us within 7 days, please contact the clinic through MyChart or phone. If you have a critical or abnormal lab result, we will notify you by phone as soon as possible.  Submit refill requests through [employer] or call your pharmacy and they will forward the refill request to us. Please allow 3 business days for your refill to be completed.          Additional Information About Your Visit        MyChart Information     [employer] gives you secure access to your electronic health record. If you see a primary care provider, you can also send messages to your care team and make appointments. If you have questions, please call your primary care clinic.  If you do not have a primary care provider, please call [phone] and they will assist you.        Care EveryWhere ID     This is your Care EveryWhere ID. This could be used by other organizations to access your [place] medical records  [id number]        Your Vitals Were      Pulse Pulse Oximetry                82 100%           Blood Pressure from Last 3 Encounters:   01/30/17 138/84   01/09/17 145/89   01/08/17 143/89    Weight from Last 3 Encounters:   02/01/17 67.586 kg (149 lb)   01/30/17 67.767 kg (149 lb 6.4 oz)   01/09/17 68.402 kg (150 lb 12.8 oz)              Today, you had the following     No orders found for display       Primary Care Provider Office Phone # Fax #    Aura EMILY Rosario PA-C [phone]       [facility] [address] [facility] [address]  [facility] [address]        Thank you!     Thank you for choosing [facility]  for your care. Our goal is always to provide you with excellent care. Hearing back from our patients is one way we can continue to improve our services. Please take a few minutes to complete the written survey that you may receive in the mail after your visit with us. Thank you!             Your Updated Medication List - Protect others around you: Learn how to safely use, store and throw away your medicines at www.disposemymeds.org.          This list is accurate as of: 2/1/17  3:38 PM.  Always use your most recent med list.                   Brand Name Dispense Instructions for use    albuterol 108 (90 BASE) MCG/ACT Inhaler    PROAIR HFA/PROVENTIL HFA/VENTOLIN HFA    3 Inhaler    Inhale 2 puffs into the lungs every 6 hours as needed for shortness of breath / dyspnea or wheezing       ALLEGRA 180 MG tablet   Generic drug:  fexofenadine      Take by mouth daily       ARIPiprazole 2 MG tablet    ABILIFY    90 tablet    Take 1 tablet (2 mg) by mouth At Bedtime       cyclobenzaprine 10 MG tablet    FLEXERIL    60 tablet    Take 1 tablet (10 mg) by mouth 2 times daily as needed for muscle spasms or other (back pain)       dexlansoprazole 60 MG Cpdr CR capsule    DEXILANT    30 capsule    Take 1 capsule (60 mg) by mouth daily       dicyclomine 20 MG tablet    BENTYL    30 tablet    Take 1 tablet (20 mg) by  mouth 4 times daily as needed (ABDOMINAL CRAMPING) AS NEEDED FOR CRAMPING       DULoxetine 60 MG EC capsule    CYMBALTA    90 capsule    Take 1 capsule (60 mg) by mouth daily       EPINEPHrine 0.3 MG/0.3ML injection     0.6 mL    Inject 0.3 mLs (0.3 mg) into the muscle once as needed for anaphylaxis       FLEX-A-MIN JOINT FLEX PO      Take by mouth daily       fluticasone 50 MCG/ACT spray    FLONASE    1 Package    Spray 2 sprays into both nostrils daily       fluticasone-salmeterol 100-50 MCG/DOSE diskus inhaler    ADVAIR    1 Inhaler    Inhale 1 puff into the lungs 2 times daily       gabapentin 300 MG capsule    NEURONTIN    270 capsule    Take 1 capsule (300 mg) by mouth 3 times daily       HYDROcodone-acetaminophen 5-325 MG per tablet    NORCO    30 tablet    Take 1-2 tablets by mouth nightly as needed for moderate to severe pain       Multi-vitamin Tabs tablet   Generic drug:  multivitamin, therapeutic with minerals      Take 1 tablet by mouth daily.       naproxen 500 MG tablet    NAPROSYN    180 tablet    Take 1 tablet (500 mg) by mouth 2 times daily as needed for moderate pain       ondansetron 4 MG ODT tab    ZOFRAN ODT    30 tablet    Take 1-2 tablets (4-8 mg) by mouth every 6 hours as needed for nausea Take 1-2 tablets by mouth as needed for nausea       ondansetron 4 MG tablet    ZOFRAN    30 tablet    Take 1 tablet (4 mg) by mouth every 8 hours as needed for nausea       order for DME     1 Units    Equipment being ordered: Nebulizer       polyethylene glycol powder    MIRALAX    527 g    Take 17 g (1 capful) by mouth daily       promethazine 25 MG tablet    PHENERGAN     Take by mouth every 6 hours as needed for nausea       sucralfate 1 GM tablet    CARAFATE     Take by mouth 4 times daily       SUMAtriptan 50 MG tablet    IMITREX    18 tablet    Take 1 tablet (50 mg) by mouth at onset of headache for migraine May repeat in 2 hours. Max 8 tablets/24 hours.       vitamin D 76779 UNIT capsule     ERGOCALCIFEROL    8 capsule    Take 1 capsule (50,000 Units) by mouth every 7 days for 8 doses       Vitamin D-3 5000 UNITS Tabs      Take by mouth daily

## 2017-02-01 NOTE — PROGRESS NOTES
SUBJECTIVE:                                                    Katy Islas is a 26 year old female who presents to clinic today for the following health issues:  Long standing bilateral deafness since birtth. The patient has some speech but mostly signs and reads lips. She has previously benefited from hearing aids for sound awareness.  Apparently cochlear implant wa considered but the patient was deemed poor candidate due to congenital issues with the cochlea.    Here today for hearing loss and would like new hearing aids.         Problem list and histories reviewed & adjusted, as indicated.  Additional history: as documented    Patient Active Problem List   Diagnosis     Hearing loss     Allergic rhinitis     Migraine     Benign neoplasm of skin of lower limb, including hip     Dysmenorrhea     Crohn's colitis (H)     Fibromyalgia     CARDIOVASCULAR SCREENING; LDL GOAL LESS THAN 160     Mild major depression (H)     Anxiety     Leg pain     Chronic pain     Low back pain     Drug-seeking behavior- See FYI notes from Dr Barton     Palpitations     Oral thrush     Poor appetite     Nausea and vomiting     Weight loss     Abdominal pain     Tension type headache     Elevated blood pressure reading without diagnosis of hypertension     Grand mal seizure disorder (H)     Right knee pain     Health Care Home     Bipolar disorder (H)     Marijuana abuse     Adjustment disorder with mixed anxiety and depressed mood     Controlled substance agreement terminated     Herpes simplex virus infection     Gastroesophageal reflux disease without esophagitis     Bee sting allergy     Mild persistent asthma without complication     Chronic bilateral low back pain with left-sided sciatica     Vitamin D deficiency     Atypical mole     Past Surgical History   Procedure Laterality Date     Hc ugi endoscopy, simple exam  7/00, 10/00     mild chronic esophagitis and duodenitis, neg H pylori     Hc eeg awake and sleep        abnormal     Hc colonoscopy thru stoma, diagnostic  10/00     normal     Hc mri brain w/o contrast  12/00     normal     Hc colonoscopy thru stoma with biopsy  10/29/2003     Impression is that of normal appearing colonoscopy, without evidence of rectal bleeding.     Hc ugi endoscopy, simple exam  01/20/2005     Esophagogastroduodenoscopy, colonoscopy with biopsies.  [facility]     Colonoscopy  7/1/2009     [facility] [place]     Hc ugi endoscopy, simple exam  7/1/2009     [facility] [place]     C removal gallbladder  8/5/2009     [facility] [place]     Hc colonoscopy thru stoma, diagnostic  Oct 2009     Dr Lóepz- normal     Hc ugi endoscopy, simple exam  11/11/2009     attempted upper GI, pt. could not tolerate procedure:[facility]      ugi endoscopy diag w biopsy  11/11/09     Normal esophagus     Orthopedic surgery  October 19,2011     diskectomy L4-L5       Social History   Substance Use Topics     Smoking status: Former Smoker     Types: Cigarettes     Quit date: 09/19/2016     Smokeless tobacco: Never Used      Comment: no smokers in household     Alcohol Use: 0.6 oz/week     1 Cans of beer per week     Family History   Problem Relation Age of Onset     GASTROINTESTINAL DISEASE Brother      severe Crohn's     Neurologic Disorder Brother      Seizures post head injury     Depression Brother      Substance Abuse Brother      Genitourinary Problems Father      kidney stones     DIABETES Father      HEART DISEASE Father      Open heart surgery     Breast Cancer Maternal Grandmother      CEREBROVASCULAR DISEASE Paternal Grandmother      CANCER Maternal Grandfather      Lung     Cardiovascular Paternal Grandfather      Heart Attack     Substance Abuse Mother          Current Outpatient Prescriptions   Medication Sig Dispense Refill     ondansetron (ZOFRAN ODT) 4 MG ODT tab Take 1-2 tablets (4-8 mg) by mouth every 6 hours as needed for nausea  Take 1-2 tablets by mouth as needed for nausea 30 tablet 1     HYDROcodone-acetaminophen (NORCO) 5-325 MG per tablet Take 1-2 tablets by mouth nightly as needed for moderate to severe pain 30 tablet 0     ARIPiprazole (ABILIFY) 2 MG tablet Take 1 tablet (2 mg) by mouth At Bedtime 90 tablet 3     SUMAtriptan (IMITREX) 50 MG tablet Take 1 tablet (50 mg) by mouth at onset of headache for migraine May repeat in 2 hours. Max 8 tablets/24 hours. 18 tablet 3     ondansetron (ZOFRAN) 4 MG tablet Take 1 tablet (4 mg) by mouth every 8 hours as needed for nausea 30 tablet 11     vitamin D (ERGOCALCIFEROL) 68757 UNIT capsule Take 1 capsule (50,000 Units) by mouth every 7 days for 8 doses 8 capsule 0     polyethylene glycol (MIRALAX) powder Take 17 g (1 capful) by mouth daily 527 g 0     cyclobenzaprine (FLEXERIL) 10 MG tablet Take 1 tablet (10 mg) by mouth 2 times daily as needed for muscle spasms or other (back pain) 60 tablet 3     DULoxetine (CYMBALTA) 60 MG EC capsule Take 1 capsule (60 mg) by mouth daily 90 capsule 3     naproxen (NAPROSYN) 500 MG tablet Take 1 tablet (500 mg) by mouth 2 times daily as needed for moderate pain 180 tablet 1     gabapentin (NEURONTIN) 300 MG capsule Take 1 capsule (300 mg) by mouth 3 times daily 270 capsule 1     dexlansoprazole (DEXILANT) 60 MG CPDR CR capsule Take 1 capsule (60 mg) by mouth daily 30 capsule 3     fluticasone-salmeterol (ADVAIR) 100-50 MCG/DOSE diskus inhaler Inhale 1 puff into the lungs 2 times daily 1 Inhaler 5     sucralfate (CARAFATE) 1 GM tablet Take by mouth 4 times daily       promethazine (PHENERGAN) 25 MG tablet Take by mouth every 6 hours as needed for nausea       EPINEPHrine (EPIPEN) 0.3 MG/0.3ML injection Inject 0.3 mLs (0.3 mg) into the muscle once as needed for anaphylaxis 0.6 mL 1     dicyclomine (BENTYL) 20 MG tablet Take 1 tablet (20 mg) by mouth 4 times daily as needed (ABDOMINAL CRAMPING) AS NEEDED FOR CRAMPING 30 tablet 0     albuterol (PROAIR HFA,  PROVENTIL HFA, VENTOLIN HFA) 108 (90 BASE) MCG/ACT inhaler Inhale 2 puffs into the lungs every 6 hours as needed for shortness of breath / dyspnea or wheezing 3 Inhaler 1     fluticasone (FLONASE) 50 MCG/ACT nasal spray Spray 2 sprays into both nostrils daily 1 Package 5     ORDER FOR DME Equipment being ordered: Nebulizer 1 Units 0     Cholecalciferol (VITAMIN D-3) 5000 UNITS TABS Take by mouth daily       Misc Natural Products (FLEX-A-MIN JOINT FLEX PO) Take by mouth daily       fexofenadine (ALLEGRA) 180 MG tablet Take by mouth daily       Multiple Vitamin (MULTI-VITAMIN) per tablet Take 1 tablet by mouth daily.         Allergies   Allergen Reactions     Baclofen      hives     Bees Hives, Swelling and Difficulty breathing     Caffeine      Contrast Dye      Hives,   Updated 5/10/2016 CT Contrast.     Diazepam      Other reaction(s): Other - Describe In Comment Field  Becomes aggressive and angry     Iodine Hives     Methocarbamol Swelling     Metoclopramide      Other reaction(s): Tremors  LW Reaction: shaking/sweating     Midazolam      Other reaction(s): Agitation     Monosodium Glutamate      Morphine Other (See Comments)     Difficulty with urination     Other (Do Not Use) Other (See Comments)     Xanaflex- pt becomes disoriented and loses bladder control     Reglan [Metoclopramide Hcl] Other (See Comments)     shaking     Soma [Carisoprodol] Visual Disturbance     Sleep walking     Tizanidine      Topamax Other (See Comments)     Topamax [Topiramate] Nausea     Tingling  GI/Vomit     Tramadol      Severe Headache, Seizure Risk     Tylenol W/Codeine [Acetaminophen-Codeine] Nausea and Itching     Tylenol 3     Valium Other (See Comments)     Becomes aggressive and angry     Versed Other (See Comments)     Zolmitriptan      Makes face feel like its twitching     Droperidol Anxiety     Flu Virus Vaccine Rash      Arm swelling     Problem list, Medication list, Allergies, and Medical/Social/Surgical histories  reviewed in [facility] and updated as appropriate.    ROS:  Constitutional, HEENT, cardiovascular, pulmonary, gi and gu systems are negative, except as otherwise noted.    OBJECTIVE:                                                    Pulse 82  Wt 67.586 kg (149 lb)  SpO2 100%  Body mass index is 27.95 kg/(m^2).  GENERAL: healthy, alert and no distress  EYES: Eyes grossly normal to inspection, PERRL and conjunctivae and sclerae normal  HENT: ear canals and TM's normal, nose and mouth without ulcers or lesions  NECK: no adenopathy, no asymmetry, masses, or scars and thyroid normal to palpation  MS: no gross musculoskeletal defects noted, no edema  NEURO: Normal strength and tone, mentation intact and speech normal    Diagnostic Test Results:  Audio: complete deafness, normal tymps     ASSESSMENT/PLAN:                                                    The patient understands limitations of hearing aids yet wishes to proceed. Recheck in 2 years        Vargas Langley MD,  [facility]

## 2017-02-01 NOTE — NURSING NOTE
"Chief Complaint   Patient presents with     Consult     Referring Aura Rosario     Monitor Known Hearing Loss     Would like new hearing aids.        Initial Pulse 82  Wt 67.586 kg (149 lb)  SpO2 100% Estimated body mass index is 27.95 kg/(m^2) as calculated from the following:    Height as of 1/6/17: 1.555 m (5' 1.22\").    Weight as of this encounter: 67.586 kg (149 lb).  BP completed using cuff size: NA (Not Taken)  "

## 2017-02-02 DIAGNOSIS — Z01.812 PRE-OPERATIVE LABORATORY EXAMINATION: Primary | ICD-10-CM

## 2017-02-06 NOTE — PROGRESS NOTES
[address]  [facility] [address]  [phone]  Dept: [phone]    PRE-OP EVALUATION:  Today's date: 2017    Katy Islas (: 1990) presents for pre-operative evaluation assessment as requested by Dr. Eugene.  She requires evaluation and anesthesia risk assessment prior to undergoing surgery/procedure for treatment of left sided low back pain.  Proposed procedure: Lumbar Fusion, Oblique lateral lumbar interbody fusion    Date of Surgery/ Procedure: 17  Time of Surgery/ Procedure: 7:00am  Hospital/Surgical Facility: [facility]  Fax number for surgical facility: [facility], [phone] AND [facility] [phone]  Primary Physician: Aura Rosario  Type of Anesthesia Anticipated: General    Patient has a Health Care Directive or Living Will:  NO    Preop Questions 2017   1.  Do you have a history of heart attack, stroke, stent, bypass or surgery on an artery in the head, neck, heart or legs? No   2.  Do you ever have any pain or discomfort in your chest? No   3.  Do you have a history of  Heart Failure? No   4.   Are you troubled by shortness of breath when:  walking on a level surface, or up a slight hill, or at night? No   5.  Do you currently have a cold, bronchitis or other respiratory infection? No   6.  Do you have a cough, shortness of breath, or wheezing? No   7.  Do you sometimes get pains in the calves of your legs when you walk? No   8. Do you or anyone in your family have previous history of blood clots? No   9.  Do you or does anyone in your family have a serious bleeding problem such as prolonged bleeding following surgeries or cuts? No   10. Have you ever had problems with anemia or been told to take iron pills? No   11. Have you had any abnormal blood loss such as black, tarry or bloody stools, or abnormal vaginal bleeding? No   12. Have you ever had a blood transfusion?  No   13. Have you or any of your relatives ever had problems with anesthesia? No   14. Do you have sleep apnea, excessive snoring or daytime drowsiness? No   15. Do you have any prosthetic heart valves? No   16. Do you have prosthetic joints? No   17. Is there any chance that you may be pregnant? YES, no periods recently due to depo injections, missed scheduled Depo in Dec, has not yet resumed normal menstruation, no unprotected sex in the last 2 weeks          HPI:                                                      Brief HPI related to upcoming procedure: Patient with chronic back pain in area of L4-5, failed conservative measures, previous left laminectomy Oct 2011, MRI 12/2/16 showed lumbar DDD with bulge at L5      See problem list for active medical problems.  Problems all longstanding and stable, except as noted/documented.  See ROS for pertinent symptoms related to these conditions.                                                                                                     ASTHMA - Patient has a longstanding history of asthma. Patient has been doing well overall noting no symptoms and continues on medication regimen consisting of Advair and Albuterol without adverse reactions or side effects.                                                                                                                                                   MEDICAL HISTORY:                                                      Patient Active Problem List    Diagnosis Date Noted     Vitamin D deficiency 01/06/2017     Priority: Medium     Atypical mole 01/06/2017     Priority: Medium     Mild persistent asthma without complication 12/02/2016     Priority: Medium     Chronic bilateral low back pain with left-sided sciatica 12/02/2016     Priority: Medium     Bee sting allergy 09/16/2016     Priority: Medium     Gastroesophageal reflux disease without esophagitis 08/26/2016     Priority: Medium     Herpes simplex virus  infection 11/12/2015     Priority: Medium     Adjustment disorder with mixed anxiety and depressed mood 10/14/2015     Priority: Medium     Controlled substance agreement terminated 03/02/2015     Priority: Medium     Bipolar disorder (H) 01/31/2013     Priority: Medium     Problem list name updated by automated process. Provider to review       Marijuana abuse 02/22/2015     Health Care Home 11/25/2013       Status:  Unable to reach  Care Coordinator:  REMY Beard, LGCHIRSTIANNE  11/25/2013    10:39 AM  Clinic Care Coordination            Grand mal seizure disorder (H) 10/08/2013     Right knee pain 10/08/2013     Tension type headache 10/04/2013     Elevated blood pressure reading without diagnosis of hypertension 10/04/2013     Poor appetite 12/13/2012     Nausea and vomiting 12/13/2012     Weight loss 12/13/2012     Abdominal pain 12/13/2012     Palpitations 06/25/2012     Oral thrush 06/25/2012     Drug-seeking behavior- See FYI notes from Dr Barton 03/27/2012     Chronic pain 02/09/2012     Narcotic Agreement signed January 12, 2015. Chronic Narcotic agreement broken due to failed urine drug screen.         Low back pain 02/09/2012     Leg pain 11/01/2011     Anxiety 09/22/2011     Mild major depression (H) 08/29/2011     CARDIOVASCULAR SCREENING; LDL GOAL LESS THAN 160 10/31/2010     Fibromyalgia 12/17/2009     Crohn's colitis (H) 08/04/2009     Dysmenorrhea 05/11/2007     Benign neoplasm of skin of lower limb, including hip 03/16/2004     Migraine      Dr. Farrar - Neurology.  Now on Inderal.  Seems to be working.  Follow-up 9/08  Problem list name updated by automated process. Provider to review       Hearing loss      Congenital  Problem list name updated by automated process. Provider to review       Allergic rhinitis      Problem list name updated by automated process. 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, without mention of intractable epilepsy      pseudoseizures diagnosed after extensive neurologic eval     Mild intermittent asthma      mild intermittent     Intestinal infection due to Clostridium difficile 10/00     C diff culture and toxin positive, treated with Flagyl     Mycoplasma infection in conditions classified elsewhere and of unspecified site      Crohn's disease (H)      sees Dr Summers or Ryan at [facility] in [place]     Depression with anxiety 2003     Dr Bernard (psychiatry) at [facility],      Abdominal pain 10/31- 11/4/2005     [facility] admit for Crohn's     Migraine 07/21/12     D/C 07/22/12-[facility]     Crohn's disease (H)      Renal disease      Kidney stones     Other chronic pain      Back pain for 6 years     Arthritis      C. difficile diarrhea      Past, no current diarrhea.     Past Surgical History   Procedure Laterality Date      ugi endoscopy, simple exam  7/00, 10/00     mild chronic esophagitis and duodenitis, neg H pylori     Hc eeg awake and sleep       abnormal     Hc colonoscopy thru stoma, diagnostic  10/00     normal     Hc mri brain w/o contrast  12/00     normal      colonoscopy thru stoma with biopsy  10/29/2003     Impression is that of normal appearing colonoscopy, without evidence of rectal bleeding.      ugi endoscopy, simple exam  01/20/2005     Esophagogastroduodenoscopy, colonoscopy with biopsies.  [facility]     Colonoscopy  7/1/2009     [facility] [place]      ugi endoscopy, simple exam  7/1/2009     [facility] [place]     C removal gallbladder   8/5/2009     [facility] [place]      colonoscopy thru stoma, diagnostic  Oct 2009     Dr López- normal      ugi endoscopy, simple exam  11/11/2009     attempted upper GI, pt. could not tolerate procedure:[facility]      ugi endoscopy diag w biopsy  11/11/09     Normal esophagus     Orthopedic surgery  October 19,2011     diskectomy L4-L5     Current Outpatient Prescriptions   Medication Sig Dispense Refill     medical cannabis inhalation (Patient's own supply.  Not a prescription) Inhale into the lungs 3 times daily as needed (This is NOT a prescription, and does not certify that the patient has a qualifying medical condition for medical cannabis.  The purpose of this order is  to document that the patient reports taking medical cannabis.)       ondansetron (ZOFRAN ODT) 4 MG ODT tab Take 1-2 tablets (4-8 mg) by mouth every 6 hours as needed for nausea Take 1-2 tablets by mouth as needed for nausea 30 tablet 1     HYDROcodone-acetaminophen (NORCO) 5-325 MG per tablet Take 1-2 tablets by mouth nightly as needed for moderate to severe pain 30 tablet 0     ARIPiprazole (ABILIFY) 2 MG tablet Take 1 tablet (2 mg) by mouth At Bedtime 90 tablet 3     SUMAtriptan (IMITREX) 50 MG tablet Take 1 tablet (50 mg) by mouth at onset of headache for migraine May repeat in 2 hours. Max 8 tablets/24 hours. 18 tablet 3     ondansetron (ZOFRAN) 4 MG tablet Take 1 tablet (4 mg) by mouth every 8 hours as needed for nausea 30 tablet 11     vitamin D (ERGOCALCIFEROL) 73840 UNIT capsule Take 1 capsule (50,000 Units) by mouth every 7 days for 8 doses 8 capsule 0     cyclobenzaprine (FLEXERIL) 10 MG tablet Take 1 tablet (10 mg) by mouth 2 times daily as needed for muscle spasms or other (back pain) 60 tablet 3     DULoxetine (CYMBALTA) 60 MG EC capsule Take 1 capsule (60 mg) by mouth daily 90 capsule 3     naproxen (NAPROSYN) 500 MG tablet Take 1 tablet (500 mg) by mouth 2 times daily as needed for moderate pain  180 tablet 1     gabapentin (NEURONTIN) 300 MG capsule Take 1 capsule (300 mg) by mouth 3 times daily 270 capsule 1     dexlansoprazole (DEXILANT) 60 MG CPDR CR capsule Take 1 capsule (60 mg) by mouth daily 30 capsule 3     fluticasone-salmeterol (ADVAIR) 100-50 MCG/DOSE diskus inhaler Inhale 1 puff into the lungs 2 times daily 1 Inhaler 5     sucralfate (CARAFATE) 1 GM tablet Take by mouth 4 times daily       promethazine (PHENERGAN) 25 MG tablet Take by mouth every 6 hours as needed for nausea       EPINEPHrine (EPIPEN) 0.3 MG/0.3ML injection Inject 0.3 mLs (0.3 mg) into the muscle once as needed for anaphylaxis 0.6 mL 1     dicyclomine (BENTYL) 20 MG tablet Take 1 tablet (20 mg) by mouth 4 times daily as needed (ABDOMINAL CRAMPING) AS NEEDED FOR CRAMPING 30 tablet 0     albuterol (PROAIR HFA, PROVENTIL HFA, VENTOLIN HFA) 108 (90 BASE) MCG/ACT inhaler Inhale 2 puffs into the lungs every 6 hours as needed for shortness of breath / dyspnea or wheezing 3 Inhaler 1     fluticasone (FLONASE) 50 MCG/ACT nasal spray Spray 2 sprays into both nostrils daily 1 Package 5     ORDER FOR DME Equipment being ordered: Nebulizer 1 Units 0     Cholecalciferol (VITAMIN D-3) 5000 UNITS TABS Take by mouth daily       Misc Natural Products (FLEX-A-MIN JOINT FLEX PO) Take by mouth daily       fexofenadine (ALLEGRA) 180 MG tablet Take by mouth daily       Multiple Vitamin (MULTI-VITAMIN) per tablet Take 1 tablet by mouth daily.         OTC products: None, except as noted above    Allergies   Allergen Reactions     Baclofen      hives     Bees Hives, Swelling and Difficulty breathing     Caffeine      Contrast Dye      Hives,   Updated 5/10/2016 CT Contrast.     Diazepam      Other reaction(s): Other - Describe In Comment Field  Becomes aggressive and angry     Iodine Hives     Methocarbamol Swelling     Metoclopramide      Other reaction(s): Tremors  LW Reaction: shaking/sweating     Midazolam      Other reaction(s): Agitation      Monosodium Glutamate      Morphine Other (See Comments)     Difficulty with urination     Other (Do Not Use) Other (See Comments)     Xanaflex- pt becomes disoriented and loses bladder control     Reglan [Metoclopramide Hcl] Other (See Comments)     shaking     Soma [Carisoprodol] Visual Disturbance     Sleep walking     Tizanidine      Topamax Other (See Comments)     Topamax [Topiramate] Nausea     Tingling  GI/Vomit     Tramadol      Severe Headache, Seizure Risk     Tylenol W/Codeine [Acetaminophen-Codeine] Nausea and Itching     Tylenol 3     Valium Other (See Comments)     Becomes aggressive and angry     Versed Other (See Comments)     Zolmitriptan      Makes face feel like its twitching     Droperidol Anxiety     Flu Virus Vaccine Rash      Arm swelling      Latex Allergy: NO    Social History   Substance Use Topics     Smoking status: Former Smoker     Types: Cigarettes     Smokeless tobacco: Never Used      Comment: 3 cigarettes weekly     Alcohol Use: 0.6 oz/week     1 Cans of beer per week      Comment: 1-2 drinks montlhly     History   Drug Use     Yes     Comment: Medical Marijuana currently       REVIEW OF SYSTEMS:                                                    Constitutional, neuro, ENT, endocrine, pulmonary, cardiac, gastrointestinal, genitourinary, musculoskeletal, integument and psychiatric systems are negative, except as otherwise noted.    EXAM:                                                    /80 (BP Location: Left arm, Patient Position: Chair, Cuff Size: Adult Regular)  Pulse 95  Temp 98.7  F (37.1  C) (Oral)  Wt 147 lb (66.7 kg)  LMP 02/02/2012  SpO2 99%  BMI 26.89 kg/m2    GENERAL APPEARANCE: healthy, alert and no distress     EYES: EOMI,- PERRL     HENT: ear canals and TM's normal and nose and mouth without ulcers or lesions     NECK: no adenopathy, no asymmetry, masses, or scars and thyroid normal to palpation     RESP: lungs clear to auscultation - no rales, rhonchi or  wheezes     CV: regular rates and rhythm, normal S1 S2, no S3 or S4 and no murmur, click or rub -     ABDOMEN:  soft, nontender, no HSM or masses and bowel sounds normal     MS: extremities normal- no gross deformities noted, no evidence of inflammation in joints, FROM in all extremities.     SKIN: no suspicious lesions or rashes     NEURO: Normal strength and tone, sensory exam grossly normal, mentation intact and speech normal     PSYCH: mentation appears normal. and affect normal/bright    DIAGNOSTICS:                                                      Per surgeon's orders, collected today  - MRSA nasal swab   - CBC  - BMP  - INR  - PTT    Also ordered  - Beta HCG       Labs Resulted Today:   Results for orders placed or performed in visit on 01/09/17   Vitamin B12   Result Value Ref Range    Vitamin B12 400 193 - 986 pg/mL   Iron and iron binding capacity   Result Value Ref Range    Iron 82 35 - 180 ug/dL    Iron Binding Cap 362 240 - 430 ug/dL    Iron Saturation Index 23 15 - 46 %   Ferritin   Result Value Ref Range    Ferritin 42 12 - 150 ng/mL   TSH with free T4 reflex   Result Value Ref Range    TSH 1.40 0.40 - 4.00 mU/L   Folate   Result Value Ref Range    Folate 16.2 >5.4 ng/mL     *Note: Due to a large number of results and/or encounters for the requested time period, some results have not been displayed. A complete set of results can be found in Results Review.     Labs Drawn and in Process:   Unresulted Labs Ordered in the Past 30 Days of this Admission     Date and Time Order Name Status Description    2/13/2017 1102 PARTIAL THROMBOPLASTIN TIME In process     2/13/2017 1102 INR In process     2/13/2017 1059 BASIC METABOLIC PANEL In process     2/13/2017 1052 25 HYDROXYVITAMIN D2 AND D3 In process     2/13/2017 1052 CBC WITH PLATELETS In process     2/13/2017 1051 BETA HCG QUAL IFA URINE In process           Recent Labs   Lab Test  01/08/17   2115  01/02/17   0845   06/06/12   1355   HGB  14.0  13.3    < >  11.1*   PLT  275  232   < >  263   INR  0.91   --    --   1.00   NA  146*  143   < >  139   POTASSIUM  4.1  4.0   < >  3.8   CR  0.74  0.78   < >  0.77    < > = values in this interval not displayed.        IMPRESSION:                                                    Reason for surgery/procedure: DDD lumber with bulge at L4, previous laminectomy, chronic back pain failed conservative measures   Diagnosis/reason for consult: Pre operative clearance     The proposed surgical procedure is considered INTERMEDIATE risk.    REVISED CARDIAC RISK INDEX  The patient has the following serious cardiovascular risks for perioperative complications such as (MI, PE, VFib and 3  AV Block):  No serious cardiac risks  INTERPRETATION: 0 risks: Class I (very low risk - 0.4% complication rate)    The patient has the following additional risks for perioperative complications:  No identified additional risks      ICD-10-CM    1. Preop general physical exam Z01.818 Beta HCG qual IFA urine     CBC with platelets     Basic metabolic panel   2. Pre-operative laboratory examination Z01.812 Methicillin Resistant Staph Aureus PCR   3. Left-sided low back pain without sciatica, unspecified chronicity M54.5 HYDROcodone-acetaminophen (NORCO) 5-325 MG per tablet   4. DDD (degenerative disc disease), lumbar M51.36    5. Gastroesophageal reflux disease without esophagitis K21.9 dexlansoprazole (DEXILANT) 60 MG CPDR CR capsule   6. Dysmenorrhea N94.6    7. Crohn's colitis, without complications (H) K50.10 INR     Partial thromboplastin time   8. Mild persistent asthma without complication J45.30    9. Elevated blood pressure reading without diagnosis of hypertension R03.0    10. Pain in both lower extremities  M79.604 Partial thromboplastin time    M79.605    11. Hearing loss, bilateral H91.93    12. Chronic migraine without aura without status migrainosus, not intractable G43.709    13. Major depressive disorder, recurrent episode, mild (H)  F33.0    14. Anxiety F41.9    15. Grand mal seizure disorder (H) G40.409    16. Bipolar affective disorder in remission (H) F31.70    17. Marijuana abuse F12.10    18. Vitamin D deficiency E55.9 25 [place] Vit D therapy monitoring       RECOMMENDATIONS:                                                    --Consult hospital rounder / IM to assist post-op medical management    --Patient is to take all scheduled medications on the day of surgery EXCEPT for modifications listed below.      - Patient only takes Dexilant in the AM, rest of medications at night       - Patient instructed to take Dexilant in the AM the morning of procedure with small sip of water    - Patient instructed to stop all NSAIDs 7-10 days prior to surgery  - Patient instructed to abstain from unprotected sex prior to surgery     APPROVAL GIVEN to proceed with proposed procedure, pending further diagnostic evaluation. Addendum will be issued when all labs are resulted.        Signed Electronically by: Aura Rosario PA-C    Copy of this evaluation report is provided to requesting physician.    [facility] Preop Guidelines      Answers for HPI/ROS submitted by the patient on 2/13/2017   If you checked off any problems, how difficult have these problems made it for you to do your work, take care of things at home, or get along with other people?: Not difficult at all  PHQ9 TOTAL SCORE: 1  JULIETH 7 TOTAL SCORE: 2

## 2017-02-11 ENCOUNTER — MYC MEDICAL ADVICE (OUTPATIENT)
Dept: FAMILY MEDICINE | Facility: OTHER | Age: 27
End: 2017-02-11

## 2017-02-13 ENCOUNTER — OFFICE VISIT (OUTPATIENT)
Dept: FAMILY MEDICINE | Facility: OTHER | Age: 27
End: 2017-02-13
Payer: MEDICARE

## 2017-02-13 VITALS
DIASTOLIC BLOOD PRESSURE: 80 MMHG | BODY MASS INDEX: 26.89 KG/M2 | TEMPERATURE: 98.7 F | WEIGHT: 147 LBS | HEART RATE: 95 BPM | OXYGEN SATURATION: 99 % | SYSTOLIC BLOOD PRESSURE: 114 MMHG

## 2017-02-13 DIAGNOSIS — Z01.818 PREOP GENERAL PHYSICAL EXAM: Primary | ICD-10-CM

## 2017-02-13 DIAGNOSIS — K50.10 CROHN'S COLITIS, WITHOUT COMPLICATIONS (H): ICD-10-CM

## 2017-02-13 DIAGNOSIS — F31.70 BIPOLAR AFFECTIVE DISORDER IN REMISSION (H): ICD-10-CM

## 2017-02-13 DIAGNOSIS — F12.10 MARIJUANA ABUSE: ICD-10-CM

## 2017-02-13 DIAGNOSIS — F41.9 ANXIETY: ICD-10-CM

## 2017-02-13 DIAGNOSIS — G40.409 GRAND MAL SEIZURE DISORDER (H): ICD-10-CM

## 2017-02-13 DIAGNOSIS — E55.9 VITAMIN D DEFICIENCY: ICD-10-CM

## 2017-02-13 DIAGNOSIS — N94.6 DYSMENORRHEA: ICD-10-CM

## 2017-02-13 DIAGNOSIS — M54.50 LEFT-SIDED LOW BACK PAIN WITHOUT SCIATICA, UNSPECIFIED CHRONICITY: ICD-10-CM

## 2017-02-13 DIAGNOSIS — H91.93 HEARING LOSS, BILATERAL: ICD-10-CM

## 2017-02-13 DIAGNOSIS — M51.369 DDD (DEGENERATIVE DISC DISEASE), LUMBAR: ICD-10-CM

## 2017-02-13 DIAGNOSIS — J45.30 MILD PERSISTENT ASTHMA WITHOUT COMPLICATION: ICD-10-CM

## 2017-02-13 DIAGNOSIS — M79.605 PAIN IN BOTH LOWER EXTREMITIES: ICD-10-CM

## 2017-02-13 DIAGNOSIS — F33.0 MAJOR DEPRESSIVE DISORDER, RECURRENT EPISODE, MILD (H): ICD-10-CM

## 2017-02-13 DIAGNOSIS — R03.0 ELEVATED BLOOD PRESSURE READING WITHOUT DIAGNOSIS OF HYPERTENSION: ICD-10-CM

## 2017-02-13 DIAGNOSIS — K21.9 GASTROESOPHAGEAL REFLUX DISEASE WITHOUT ESOPHAGITIS: ICD-10-CM

## 2017-02-13 DIAGNOSIS — G43.709 CHRONIC MIGRAINE WITHOUT AURA WITHOUT STATUS MIGRAINOSUS, NOT INTRACTABLE: ICD-10-CM

## 2017-02-13 DIAGNOSIS — Z01.812 PRE-OPERATIVE LABORATORY EXAMINATION: ICD-10-CM

## 2017-02-13 DIAGNOSIS — M79.604 PAIN IN BOTH LOWER EXTREMITIES: ICD-10-CM

## 2017-02-13 LAB
ANION GAP SERPL CALCULATED.3IONS-SCNC: 8 MMOL/L (ref 3–14)
APTT PPP: 28 SEC (ref 22–37)
BETA HCG QUAL IFA URINE: NEGATIVE
BUN SERPL-MCNC: 17 MG/DL (ref 7–30)
CALCIUM SERPL-MCNC: 9.2 MG/DL (ref 8.5–10.1)
CHLORIDE SERPL-SCNC: 103 MMOL/L (ref 94–109)
CO2 SERPL-SCNC: 28 MMOL/L (ref 20–32)
CREAT SERPL-MCNC: 0.75 MG/DL (ref 0.52–1.04)
ERYTHROCYTE [DISTWIDTH] IN BLOOD BY AUTOMATED COUNT: 12 % (ref 10–15)
GFR SERPL CREATININE-BSD FRML MDRD: NORMAL ML/MIN/1.7M2
GLUCOSE SERPL-MCNC: 95 MG/DL (ref 70–99)
HCT VFR BLD AUTO: 41 % (ref 35–47)
HGB BLD-MCNC: 14.2 G/DL (ref 11.7–15.7)
INR PPP: 1 (ref 0.86–1.14)
MCH RBC QN AUTO: 34.2 PG (ref 26.5–33)
MCHC RBC AUTO-ENTMCNC: 34.6 G/DL (ref 31.5–36.5)
MCV RBC AUTO: 99 FL (ref 78–100)
PLATELET # BLD AUTO: 279 10E9/L (ref 150–450)
POTASSIUM SERPL-SCNC: 4.4 MMOL/L (ref 3.4–5.3)
RBC # BLD AUTO: 4.15 10E12/L (ref 3.8–5.2)
SODIUM SERPL-SCNC: 139 MMOL/L (ref 133–144)
WBC # BLD AUTO: 6.1 10E9/L (ref 4–11)

## 2017-02-13 PROCEDURE — 82306 VITAMIN D 25 HYDROXY: CPT | Performed by: PHYSICIAN ASSISTANT

## 2017-02-13 PROCEDURE — 80048 BASIC METABOLIC PNL TOTAL CA: CPT | Performed by: PHYSICIAN ASSISTANT

## 2017-02-13 PROCEDURE — 40000868 ZZHCL STATISTIC MRSA/MSSA PRESURGICAL SCREEN ID: Performed by: PHYSICIAN ASSISTANT

## 2017-02-13 PROCEDURE — 85610 PROTHROMBIN TIME: CPT | Performed by: PHYSICIAN ASSISTANT

## 2017-02-13 PROCEDURE — 99214 OFFICE O/P EST MOD 30 MIN: CPT | Performed by: PHYSICIAN ASSISTANT

## 2017-02-13 PROCEDURE — 87640 STAPH A DNA AMP PROBE: CPT | Performed by: NEUROLOGICAL SURGERY

## 2017-02-13 PROCEDURE — 36415 COLL VENOUS BLD VENIPUNCTURE: CPT | Performed by: PHYSICIAN ASSISTANT

## 2017-02-13 PROCEDURE — 40000869 ZZHCL STATISTIC MRSA/MSSA PRESURGICAL SCREEN CULTURE: Performed by: PHYSICIAN ASSISTANT

## 2017-02-13 PROCEDURE — 84703 CHORIONIC GONADOTROPIN ASSAY: CPT | Performed by: PHYSICIAN ASSISTANT

## 2017-02-13 PROCEDURE — 85027 COMPLETE CBC AUTOMATED: CPT | Performed by: PHYSICIAN ASSISTANT

## 2017-02-13 PROCEDURE — 85730 THROMBOPLASTIN TIME PARTIAL: CPT | Performed by: PHYSICIAN ASSISTANT

## 2017-02-13 RX ORDER — ERGOCALCIFEROL 1.25 MG/1
50000 CAPSULE, LIQUID FILLED ORAL
Qty: 8 CAPSULE | Refills: 0 | Status: CANCELLED | OUTPATIENT
Start: 2017-02-13

## 2017-02-13 RX ORDER — DEXLANSOPRAZOLE 60 MG/1
60 CAPSULE, DELAYED RELEASE ORAL DAILY
Qty: 30 CAPSULE | Refills: 3 | Status: CANCELLED | OUTPATIENT
Start: 2017-02-13

## 2017-02-13 RX ORDER — HYDROCODONE BITARTRATE AND ACETAMINOPHEN 5; 325 MG/1; MG/1
1-2 TABLET ORAL
Qty: 30 TABLET | Refills: 0 | Status: CANCELLED | OUTPATIENT
Start: 2017-02-13

## 2017-02-13 RX ORDER — HYDROCODONE BITARTRATE AND ACETAMINOPHEN 5; 325 MG/1; MG/1
1-2 TABLET ORAL
Qty: 30 TABLET | Refills: 0 | Status: SHIPPED | OUTPATIENT
Start: 2017-02-13 | End: 2017-03-03

## 2017-02-13 RX ORDER — DEXLANSOPRAZOLE 60 MG/1
60 CAPSULE, DELAYED RELEASE ORAL DAILY
Qty: 90 CAPSULE | Refills: 3 | Status: SHIPPED | OUTPATIENT
Start: 2017-02-13 | End: 2017-04-25

## 2017-02-13 ASSESSMENT — PATIENT HEALTH QUESTIONNAIRE - PHQ9
10. IF YOU CHECKED OFF ANY PROBLEMS, HOW DIFFICULT HAVE THESE PROBLEMS MADE IT FOR YOU TO DO YOUR WORK, TAKE CARE OF THINGS AT HOME, OR GET ALONG WITH OTHER PEOPLE: NOT DIFFICULT AT ALL
SUM OF ALL RESPONSES TO PHQ QUESTIONS 1-9: 1

## 2017-02-13 ASSESSMENT — ANXIETY QUESTIONNAIRES
GAD7 TOTAL SCORE: 2
7. FEELING AFRAID AS IF SOMETHING AWFUL MIGHT HAPPEN: 0 = NOT AT ALL
GAD7 TOTAL SCORE: 2

## 2017-02-13 NOTE — TELEPHONE ENCOUNTER
Per [facility] pt should request from surgeon.  Pt notified today in clinic at her appt to contact surgeon for these things in [employer] message.  Maya Cardenas, CMA

## 2017-02-13 NOTE — MR AVS SNAPSHOT
After Visit Summary   2/13/2017    Katy Islas    MRN: [id number]           Patient Information     Date Of Birth          1990        Visit Information        Provider Department      2/13/2017 10:30 AM Aura Rosario PA-C; [facility]        Today's Diagnoses     Preop general physical exam    -  1    Left-sided low back pain without sciatica, unspecified chronicity        Vitamin D deficiency        Gastroesophageal reflux disease without esophagitis        Dysmenorrhea        Crohn's colitis, without complications (H)        Elevated blood pressure reading without diagnosis of hypertension        Pain in both lower extremities           Care Instructions      Before Your Surgery      Call your surgeon if there is any change in your health. This includes signs of a cold or flu (such as a sore throat, runny nose, cough, rash or fever).    Do not smoke, drink alcohol or take over the counter medicine (unless your surgeon or primary care doctor tells you to) for the 24 hours before and after surgery.    If you take prescribed drugs: Follow your doctor s orders about which medicines to take and which to stop until after surgery.    Eating and drinking prior to surgery: follow the instructions from your surgeon    Take a shower or bath the night before surgery. Use the soap your surgeon gave you to gently clean your skin. If you do not have soap from your surgeon, use your regular soap. Do not shave or scrub the surgery site.  Wear clean pajamas and have clean sheets on your bed.         Follow-ups after your visit        Your next 10 appointments already scheduled     Feb 21, 2017 11:00 AM CST   Return Visit with Natalie Matthew Laura Wilbur   [facility]    [address] [place] [address]   [phone]            Feb 23, 2017  8:50 AM CST   [facility] Outpatient with Dayana  Jamar    Services Department ([facility])    [address] [facility] [address]               Feb 23, 2017   Procedure with Jeniffer Eugene MD   [facility] PeriOp Services (--)    [address] [facility] [address]   [phone]            Feb 23, 2017  1:30 PM [facility] Outpatient with Dayana Roldan    Services Department ([facility])    [address] [place] [address]                 Who to contact     If you have questions or need follow up information about today's clinic visit or your schedule please contact [facility] directly at [phone].  Normal or non-critical lab and imaging results will be communicated to you by MyChart, letter or phone within 4 business days after the clinic has received the results. If you do not hear from us within 7 days, please contact the clinic through MyChart or phone. If you have a critical or abnormal lab result, we will notify you by phone as soon as possible.  Submit refill requests through [employer] or call your pharmacy and they will forward the refill request to us. Please allow 3 business days for your refill to be completed.          Additional Information About Your Visit        [employer] Information     [employer] gives you secure access to your electronic health record. If you see a primary care provider, you can also send messages to your care team and make appointments. If you have questions, please call your primary care clinic.  If you do not have a primary care provider, please call [phone] and they will assist you.        Care EveryWhere ID     This is your Care EveryWhere ID. This could be used by other organizations to access your [place] medical records  [id number]        Your Vitals Were     Pulse Temperature Last Period Pulse Oximetry BMI (Body Mass Index)       95  98.7  F (37.1  C) (Oral) 02/02/2012 99% 26.89 kg/m2        Blood Pressure from Last 3 Encounters:   02/13/17 114/80   01/30/17 138/84   01/09/17 145/89    Weight from Last 3 Encounters:   02/13/17 147 lb (66.7 kg)   02/02/17 140 lb (63.5 kg)   02/01/17 149 lb (67.6 kg)              We Performed the Following     25 OH Vit D therapy monitoring     Basic metabolic panel     Beta HCG qual IFA urine     CBC with platelets     INR     Partial thromboplastin time          Where to get your medicines      These medications were sent to [facility] [place] - [address]  [address], [facility] [address]     Phone:  [phone]     dexlansoprazole 60 MG Cpdr CR capsule         Some of these will need a paper prescription and others can be bought over the counter.  Ask your nurse if you have questions.     Bring a paper prescription for each of these medications     HYDROcodone-acetaminophen 5-325 MG per tablet          Primary Care Provider Office Phone # Fax #    Aura Rosario PA-C [phone]       [facility] [address]  [facility] [address]        Thank you!     Thank you for choosing [facility]  for your care. Our goal is always to provide you with excellent care. Hearing back from our patients is one way we can continue to improve our services. Please take a few minutes to complete the written survey that you may receive in the mail after your visit with us. Thank you!             Your Updated Medication List - Protect others around you: Learn how to safely use, store and throw away your medicines at www.disposemymeds.org.          This list is accurate as of: 2/13/17 11:07 AM.  Always use your most recent med list.                   Brand Name Dispense Instructions for use    albuterol 108 (90 BASE) MCG/ACT Inhaler    PROAIR HFA/PROVENTIL HFA/VENTOLIN HFA    3 Inhaler    Inhale 2 puffs into the lungs every 6 hours as  needed for shortness of breath / dyspnea or wheezing       ALLEGRA 180 MG tablet   Generic drug:  fexofenadine      Take by mouth daily       ARIPiprazole 2 MG tablet    ABILIFY    90 tablet    Take 1 tablet (2 mg) by mouth At Bedtime       cyclobenzaprine 10 MG tablet    FLEXERIL    60 tablet    Take 1 tablet (10 mg) by mouth 2 times daily as needed for muscle spasms or other (back pain)       dexlansoprazole 60 MG Cpdr CR capsule    DEXILANT    90 capsule    Take 1 capsule (60 mg) by mouth daily       dicyclomine 20 MG tablet    BENTYL    30 tablet    Take 1 tablet (20 mg) by mouth 4 times daily as needed (ABDOMINAL CRAMPING) AS NEEDED FOR CRAMPING       DULoxetine 60 MG EC capsule    CYMBALTA    90 capsule    Take 1 capsule (60 mg) by mouth daily       EPINEPHrine 0.3 MG/0.3ML injection     0.6 mL    Inject 0.3 mLs (0.3 mg) into the muscle once as needed for anaphylaxis       FLEX-A-MIN JOINT FLEX PO      Take by mouth daily       fluticasone 50 MCG/ACT spray    FLONASE    1 Package    Spray 2 sprays into both nostrils daily       fluticasone-salmeterol 100-50 MCG/DOSE diskus inhaler    ADVAIR    1 Inhaler    Inhale 1 puff into the lungs 2 times daily       gabapentin 300 MG capsule    NEURONTIN    270 capsule    Take 1 capsule (300 mg) by mouth 3 times daily       HYDROcodone-acetaminophen 5-325 MG per tablet    NORCO    30 tablet    Take 1-2 tablets by mouth nightly as needed for moderate to severe pain       medical cannabis inhalation (Patient's own supply.  Not a prescription)      Inhale into the lungs 3 times daily as needed Reported on 2/13/2017       Multi-vitamin Tabs tablet   Generic drug:  multivitamin, therapeutic with minerals      Take 1 tablet by mouth daily.       naproxen 500 MG tablet    NAPROSYN    180 tablet    Take 1 tablet (500 mg) by mouth 2 times daily as needed for moderate pain       ondansetron 4 MG ODT tab    ZOFRAN ODT    30 tablet    Take 1-2 tablets (4-8 mg) by mouth every 6 hours  as needed for nausea Take 1-2 tablets by mouth as needed for nausea       ondansetron 4 MG tablet    ZOFRAN    30 tablet    Take 1 tablet (4 mg) by mouth every 8 hours as needed for nausea       order for DME     1 Units    Equipment being ordered: Nebulizer       promethazine 25 MG tablet    PHENERGAN     Take by mouth every 6 hours as needed for nausea       sucralfate 1 GM tablet    CARAFATE     Take by mouth 4 times daily Reported on 2/13/2017       SUMAtriptan 50 MG tablet    IMITREX    18 tablet    Take 1 tablet (50 mg) by mouth at onset of headache for migraine May repeat in 2 hours. Max 8 tablets/24 hours.       vitamin D 77010 UNIT capsule    ERGOCALCIFEROL    8 capsule    Take 1 capsule (50,000 Units) by mouth every 7 days for 8 doses       Vitamin D-3 5000 UNITS Tabs      Take by mouth daily Reported on 2/13/2017

## 2017-02-13 NOTE — NURSING NOTE
"Chief Complaint   Patient presents with     Pre-Op Exam     Panel Management     lipid, phq/isis, act, urine drug screen       Initial /80 (BP Location: Left arm, Patient Position: Chair, Cuff Size: Adult Regular)  Pulse 95  Temp 98.7  F (37.1  C) (Oral)  Wt 147 lb (66.7 kg)  LMP 02/02/2012  SpO2 99%  BMI 26.89 kg/m2 Estimated body mass index is 26.89 kg/(m^2) as calculated from the following:    Height as of 2/2/17: 5' 2\" (1.575 m).    Weight as of this encounter: 147 lb (66.7 kg).  Medication Reconciliation: complete  "

## 2017-02-14 ENCOUNTER — TELEPHONE (OUTPATIENT)
Dept: FAMILY MEDICINE | Facility: OTHER | Age: 27
End: 2017-02-14

## 2017-02-14 LAB
BACTERIA SPEC CULT: ABNORMAL
MICRO REPORT STATUS: ABNORMAL
SPECIMEN SOURCE: ABNORMAL

## 2017-02-14 ASSESSMENT — ANXIETY QUESTIONNAIRES: GAD7 TOTAL SCORE: 2

## 2017-02-14 ASSESSMENT — PATIENT HEALTH QUESTIONNAIRE - PHQ9: SUM OF ALL RESPONSES TO PHQ QUESTIONS 1-9: 1

## 2017-02-14 NOTE — TELEPHONE ENCOUNTER
Reason for call:  Results  Reason for Call:  Request for results:    Name of test or procedure: labs    Date of test of procedure: 2-13-17    Location of the test or procedure: [facility] to leave the result message on voice mail or with a family member? YES    Phone number Patient can be reached at:  Home number on file [phone] (home)    Additional comments: call with results.    Call taken on 2/14/2017 at 3:36 PM by Kenzie Maria

## 2017-02-15 LAB
DEPRECATED CALCIDIOL+CALCIFEROL SERPL-MC: 49 UG/L (ref 20–75)
VITAMIN D2 SERPL-MCNC: 36 UG/L
VITAMIN D3 SERPL-MCNC: 13 UG/L

## 2017-02-16 NOTE — TELEPHONE ENCOUNTER
Katy does not answer through relay call.  Message left for Aysha ibrahim to call, or have Katy call.

## 2017-02-16 NOTE — TELEPHONE ENCOUNTER
Please let patient know     - Nose swab did grow out staph aureus. She should complete Mupirocin protocol that we went over at her pre-op  - Vitamin D was in normal range. I recommend she take OTC vitamin D 2,000 IU daily to maintain this level   - Rest of labs were normal. Pregnancy test negative.   - OK to proceed with surgery.    Zach Rosario PA-C  [facility]

## 2017-02-21 ENCOUNTER — OFFICE VISIT (OUTPATIENT)
Dept: AUDIOLOGY | Facility: OTHER | Age: 27
End: 2017-02-21
Payer: MEDICARE

## 2017-02-21 DIAGNOSIS — H90.3 SENSORINEURAL HEARING LOSS, BILATERAL: Primary | ICD-10-CM

## 2017-02-21 PROCEDURE — V5020 CONFORMITY EVALUATION: HCPCS | Performed by: AUDIOLOGIST

## 2017-02-21 PROCEDURE — V5011 HEARING AID FITTING/CHECKING: HCPCS | Performed by: AUDIOLOGIST

## 2017-02-21 PROCEDURE — V5160 DISPENSING FEE BINAURAL: HCPCS | Performed by: AUDIOLOGIST

## 2017-02-21 PROCEDURE — V5264 EAR MOLD/INSERT: HCPCS | Performed by: AUDIOLOGIST

## 2017-02-21 PROCEDURE — V5261 HEARING AID, DIGIT, BIN, BTE: HCPCS | Performed by: AUDIOLOGIST

## 2017-02-21 PROCEDURE — 92593 HC HEARING AID CHECK, BINAURAL: CPT | Performed by: AUDIOLOGIST

## 2017-02-21 PROCEDURE — 99207 ZZC NO CHARGE LOS: CPT | Performed by: AUDIOLOGIST

## 2017-02-21 NOTE — MR AVS SNAPSHOT
After Visit Summary   2/21/2017    Katy Islas    MRN: [id number]           Patient Information     Date Of Birth          1990        Visit Information        Provider Department      2/21/2017 11:00 AM Dayana Roldan; Autumn Tao, [facility]        Today's Diagnoses     Sensorineural hearing loss, bilateral    -  1       Follow-ups after your visit        Your next 10 appointments already scheduled     Feb 23, 2017  8:50 AM [facility] Outpatient with Dayana Roldan    Services Department ([facility])    [address] [place] [phone]               Feb 23, 2017   Procedure with Jeniffer Eugene MD   [facility] PeriOp Services (--)    [address] [facility] [address]   [phone]            Feb 23, 2017  1:30 PM [facility] Outpatient with Dayana Roldan    Services Department ([facility])    [address] [place] [phone]               Mar 21, 2017 11:00 AM CDT   Return Visit with Natalie Zimmerman   [facility]    [address] [place] [address] [address]  [facility] [id number] [phone]              Who to contact     If you have questions or need follow up information about today's clinic visit or your schedule please contact [facility] directly at [phone].  Normal or non-critical lab and imaging results will be communicated to you by MyChart, letter or phone within 4 business days after the clinic has received the results. If you do not hear from us within 7 days, please contact the clinic through MyChart or phone. If you have a critical or abnormal lab result, we will notify you by phone as soon as possible.  Submit refill requests through [employer] or call your pharmacy and they will forward the refill request to us.  Please allow 3 business days for your refill to be completed.          Additional Information About Your Visit        MyChart Information     [employer] gives you secure access to your electronic health record. If you see a primary care provider, you can also send messages to your care team and make appointments. If you have questions, please call your primary care clinic.  If you do not have a primary care provider, please call [phone] and they will assist you.        Care EveryWhere ID     This is your Care EveryWhere ID. This could be used by other organizations to access your [place] medical records  [id number]        Your Vitals Were     Last Period                   02/02/2012            Blood Pressure from Last 3 Encounters:   02/13/17 114/80   01/30/17 138/84   01/09/17 145/89    Weight from Last 3 Encounters:   02/13/17 147 lb (66.7 kg)   02/02/17 140 lb (63.5 kg)   02/01/17 149 lb (67.6 kg)              We Performed the Following     DISPENSING FEE, BINAURAL HEARING AID     EAR MOLD/INSERT, NONDISPOSABLE, ANY TYPE     HEARING AID BTE DIGITAL, BINAURAL     HEARING AID CHECK, BINAURAL     HEARING AID CONFORMITY EVALUATION     HEARING AID FIT/ORIENTATION/CHECK        Primary Care Provider Office Phone # Fax #    Aura EMILY Rosario PA-C [phone]       [facility] [address] [facility] [address]  [facility] [address]        Thank you!     Thank you for choosing [facility]  for your care. Our goal is always to provide you with excellent care. Hearing back from our patients is one way we can continue to improve our services. Please take a few minutes to complete the written survey that you may receive in the mail after your visit with us. Thank you!             Your Updated Medication List - Protect others around you: Learn how to safely use, store and throw away your medicines at www.disposemymeds.org.          This list is accurate as of: 2/21/17 12:10 PM.   Always use your most recent med list.                   Brand Name Dispense Instructions for use    albuterol 108 (90 BASE) MCG/ACT Inhaler    PROAIR HFA/PROVENTIL HFA/VENTOLIN HFA    3 Inhaler    Inhale 2 puffs into the lungs every 6 hours as needed for shortness of breath / dyspnea or wheezing       ALLEGRA 180 MG tablet   Generic drug:  fexofenadine      Take 180 mg by mouth daily       ARIPiprazole 2 MG tablet    ABILIFY    90 tablet    Take 1 tablet (2 mg) by mouth At Bedtime       cyclobenzaprine 10 MG tablet    FLEXERIL    60 tablet    Take 1 tablet (10 mg) by mouth 2 times daily as needed for muscle spasms or other (back pain)       dexlansoprazole 60 MG Cpdr CR capsule    DEXILANT    90 capsule    Take 1 capsule (60 mg) by mouth daily       dicyclomine 20 MG tablet    BENTYL    30 tablet    Take 1 tablet (20 mg) by mouth 4 times daily as needed (ABDOMINAL CRAMPING) AS NEEDED FOR CRAMPING       DULoxetine 60 MG EC capsule    CYMBALTA    90 capsule    Take 1 capsule (60 mg) by mouth daily       EPINEPHrine 0.3 MG/0.3ML injection     0.6 mL    Inject 0.3 mLs (0.3 mg) into the muscle once as needed for anaphylaxis       FLEX-A-MIN JOINT FLEX PO      Take by mouth daily       fluticasone 50 MCG/ACT spray    FLONASE    1 Package    Spray 2 sprays into both nostrils daily       fluticasone-salmeterol 100-50 MCG/DOSE diskus inhaler    ADVAIR    1 Inhaler    Inhale 1 puff into the lungs 2 times daily       gabapentin 300 MG capsule    NEURONTIN    270 capsule    Take 1 capsule (300 mg) by mouth 3 times daily       HYDROcodone-acetaminophen 5-325 MG per tablet    NORCO    30 tablet    Take 1-2 tablets by mouth nightly as needed for moderate to severe pain       medical cannabis inhalation (Patient's own supply.  Not a prescription)      Inhale into the lungs 3 times daily as needed Reported on 2/13/2017       Multi-vitamin Tabs tablet   Generic drug:  multivitamin, therapeutic with minerals      Take 1 tablet by  mouth daily.       mupirocin 2 % nasal ointment   Start taking on:  2/27/2017    BACTROBAN     Apply 1 g into each nare 2 times daily       naproxen 500 MG tablet    NAPROSYN    180 tablet    Take 1 tablet (500 mg) by mouth 2 times daily as needed for moderate pain       ondansetron 4 MG ODT tab    ZOFRAN ODT    30 tablet    Take 1-2 tablets (4-8 mg) by mouth every 6 hours as needed for nausea Take 1-2 tablets by mouth as needed for nausea       ondansetron 4 MG tablet    ZOFRAN    30 tablet    Take 1 tablet (4 mg) by mouth every 8 hours as needed for nausea       order for DME     1 Units    Equipment being ordered: Nebulizer       promethazine 25 MG tablet    PHENERGAN     Take by mouth every 6 hours as needed for nausea       sucralfate 1 GM tablet    CARAFATE     Take by mouth 4 times daily Reported on 2/13/2017       SUMAtriptan 50 MG tablet    IMITREX    18 tablet    Take 1 tablet (50 mg) by mouth at onset of headache for migraine May repeat in 2 hours. Max 8 tablets/24 hours.       vitamin D 39869 UNIT capsule    ERGOCALCIFEROL    8 capsule    Take 1 capsule (50,000 Units) by mouth every 7 days for 8 doses       Vitamin D-3 5000 UNITS Tabs      Take by mouth daily Reported on 2/13/2017

## 2017-02-21 NOTE — PROGRESS NOTES
BACKGROUND INFORMATION: Katy Islas  was seen in the Audiology Clinic at [facility] on 2/21/2017 for fitting of binaural Phonak Deena V50-UP hearing aids. Previous results (re:2/1/17) have revealed a profound sensorineural hearing loss bilaterally.  She reports it is a longstanding hearing loss since childhood and she has not worn hearing aids consistently in the past. The patient was given medical clearance to pursue amplification by Dr. Vargas Langley. She was accompanied to today's appointment by an .    PROCEDURES: A listening check revealed both hearing aids were sounding crisp and clear with no distortion or weakness noted. Hearing aids were placed and they provided a good fit. Real-ear-probe-microphone measurements were completed on the [employer] system and were a good match to NAL-1 targets. UCLs are verified through maximum power output measures and demonstrate appropriate limiting of loud inputs.The hearing aid conformity evaluation was completed.  Patient was oriented to proper hearing aid use, care, cleaning (no water, dry toothbrush), batteries (size 675, insertion/removal, toxicity, low-battery signal), aid insertion/removal, user booklet, warranty information, storage cases, and other hearing aid details.     Initially, with the hearing aids programmed to target gain, Katy reported great discomfort and stated that things were too loud.  The overall gain was decreased to 70% and set to auto-acclimatize to 90% over 30 days.  It was discussed with Katy that since she has not worn hearing aids consistently, everything will seem loud to her and that it will likely take some time for her brain to adapt.  Realistic expectations were reviewed including that she will likely have sound awareness but not necessarily speech recognition.  She reported that in the office, the hearing aids were already aiding in her lip reading. Patient did well with the hearing aids  today. The patient reported she was happy with the sound of the hearing aids today. Purchase agreement is reviewed and signed by patient. Forty-five day trial period, return policy, and repair warranty were also reviewed with patient.  PROGRAMS: 1.Everyday  Volume control was activated, per patient preference, they are not linked    EARS FIT: Binaural  MA HEARING AID MODEL NAME:  Deepthi Shaffer V50-UP  MA HEARING AID MODEL NUMBER: [id number]  HEARING AID STYLE: Behind-the-hear  EARMOLDS/TIP/ LINK: Standard earmold and tubing  SERIAL NUMBERS: Right: [id number]; Left: [id number]    SUMMARY AND RECOMMENDATIONS: Binaural hearing aids were fit today. Verification measures were performed. The patient will return for follow-up. Call this clinic with questions regarding today s appointment.      CHARGES:    15316 Hearing Aid Check ($81),  Dispensing Fee ($500),  x2 Fit/Orientation ($162),  x2 HA Conformity Eval ($87),  x2 Earmold ($80),  Hearing Aid ($2521). Total: $3760. Bill to Insurance ([employer]) then balance to patient.       Natalie Matthew  Audiologist  [place] License  #[id number]

## 2017-02-21 NOTE — H&P (VIEW-ONLY)
[address]  [facility] [address]  [phone]  Dept: [phone]    PRE-OP EVALUATION:  Today's date: 2017    Katy Islas (: 1990) presents for pre-operative evaluation assessment as requested by Dr. Eugene.  She requires evaluation and anesthesia risk assessment prior to undergoing surgery/procedure for treatment of left sided low back pain.  Proposed procedure: Lumbar Fusion, Oblique lateral lumbar interbody fusion    Date of Surgery/ Procedure: 17  Time of Surgery/ Procedure: 7:00am  Hospital/Surgical Facility: [facility]  Fax number for surgical facility: [facility], [phone] AND [facility] [phone]  Primary Physician: Aura Rosario  Type of Anesthesia Anticipated: General    Patient has a Health Care Directive or Living Will:  NO    Preop Questions 2017   1.  Do you have a history of heart attack, stroke, stent, bypass or surgery on an artery in the head, neck, heart or legs? No   2.  Do you ever have any pain or discomfort in your chest? No   3.  Do you have a history of  Heart Failure? No   4.   Are you troubled by shortness of breath when:  walking on a level surface, or up a slight hill, or at night? No   5.  Do you currently have a cold, bronchitis or other respiratory infection? No   6.  Do you have a cough, shortness of breath, or wheezing? No   7.  Do you sometimes get pains in the calves of your legs when you walk? No   8. Do you or anyone in your family have previous history of blood clots? No   9.  Do you or does anyone in your family have a serious bleeding problem such as prolonged bleeding following surgeries or cuts? No   10. Have you ever had problems with anemia or been told to take iron pills? No   11. Have you had any abnormal blood loss such as black, tarry or bloody stools, or abnormal vaginal bleeding? No   12. Have you ever had a blood transfusion?  No   13. Have you or any of your relatives ever had problems with anesthesia? No   14. Do you have sleep apnea, excessive snoring or daytime drowsiness? No   15. Do you have any prosthetic heart valves? No   16. Do you have prosthetic joints? No   17. Is there any chance that you may be pregnant? YES, no periods recently due to depo injections, missed scheduled Depo in Dec, has not yet resumed normal menstruation, no unprotected sex in the last 2 weeks          HPI:                                                      Brief HPI related to upcoming procedure: Patient with chronic back pain in area of L4-5, failed conservative measures, previous left laminectomy Oct 2011, MRI 12/2/16 showed lumbar DDD with bulge at L5      See problem list for active medical problems.  Problems all longstanding and stable, except as noted/documented.  See ROS for pertinent symptoms related to these conditions.                                                                                                     ASTHMA - Patient has a longstanding history of asthma. Patient has been doing well overall noting no symptoms and continues on medication regimen consisting of Advair and Albuterol without adverse reactions or side effects.                                                                                                                                                   MEDICAL HISTORY:                                                      Patient Active Problem List    Diagnosis Date Noted     Vitamin D deficiency 01/06/2017     Priority: Medium     Atypical mole 01/06/2017     Priority: Medium     Mild persistent asthma without complication 12/02/2016     Priority: Medium     Chronic bilateral low back pain with left-sided sciatica 12/02/2016     Priority: Medium     Bee sting allergy 09/16/2016     Priority: Medium     Gastroesophageal reflux disease without esophagitis 08/26/2016     Priority: Medium     Herpes simplex virus  infection 11/12/2015     Priority: Medium     Adjustment disorder with mixed anxiety and depressed mood 10/14/2015     Priority: Medium     Controlled substance agreement terminated 03/02/2015     Priority: Medium     Bipolar disorder (H) 01/31/2013     Priority: Medium     Problem list name updated by automated process. Provider to review       Marijuana abuse 02/22/2015     Health Care Home 11/25/2013       Status:  Unable to reach  Care Coordinator:  REMY Beard, LGCHRISTIANNE  11/25/2013    10:39 AM  Clinic Care Coordination            Grand mal seizure disorder (H) 10/08/2013     Right knee pain 10/08/2013     Tension type headache 10/04/2013     Elevated blood pressure reading without diagnosis of hypertension 10/04/2013     Poor appetite 12/13/2012     Nausea and vomiting 12/13/2012     Weight loss 12/13/2012     Abdominal pain 12/13/2012     Palpitations 06/25/2012     Oral thrush 06/25/2012     Drug-seeking behavior- See FYI notes from Dr Barton 03/27/2012     Chronic pain 02/09/2012     Narcotic Agreement signed January 12, 2015. Chronic Narcotic agreement broken due to failed urine drug screen.         Low back pain 02/09/2012     Leg pain 11/01/2011     Anxiety 09/22/2011     Mild major depression (H) 08/29/2011     CARDIOVASCULAR SCREENING; LDL GOAL LESS THAN 160 10/31/2010     Fibromyalgia 12/17/2009     Crohn's colitis (H) 08/04/2009     Dysmenorrhea 05/11/2007     Benign neoplasm of skin of lower limb, including hip 03/16/2004     Migraine      Dr. Farrar - Neurology.  Now on Inderal.  Seems to be working.  Follow-up 9/08  Problem list name updated by automated process. Provider to review       Hearing loss      Congenital  Problem list name updated by automated process. Provider to review       Allergic rhinitis      Problem list name updated by automated process. 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, without mention of intractable epilepsy      pseudoseizures diagnosed after extensive neurologic eval     Mild intermittent asthma      mild intermittent     Intestinal infection due to Clostridium difficile 10/00     C diff culture and toxin positive, treated with Flagyl     Mycoplasma infection in conditions classified elsewhere and of unspecified site      Crohn's disease (H)      sees Dr Summers or Ryan at [facility] in [place]     Depression with anxiety 2003     Dr Bernard (psychiatry) at [facility],      Abdominal pain 10/31- 11/4/2005     [facility] admit for Crohn's     Migraine 07/21/12     D/C 07/22/12-[facility]     Crohn's disease (H)      Renal disease      Kidney stones     Other chronic pain      Back pain for 6 years     Arthritis      C. difficile diarrhea      Past, no current diarrhea.     Past Surgical History   Procedure Laterality Date      ugi endoscopy, simple exam  7/00, 10/00     mild chronic esophagitis and duodenitis, neg H pylori     Hc eeg awake and sleep       abnormal     Hc colonoscopy thru stoma, diagnostic  10/00     normal     Hc mri brain w/o contrast  12/00     normal      colonoscopy thru stoma with biopsy  10/29/2003     Impression is that of normal appearing colonoscopy, without evidence of rectal bleeding.      ugi endoscopy, simple exam  01/20/2005     Esophagogastroduodenoscopy, colonoscopy with biopsies.  [facility]     Colonoscopy  7/1/2009     [facility] [place]      ugi endoscopy, simple exam  7/1/2009     [facility] [place]     C removal gallbladder   8/5/2009     [facility] [place]      colonoscopy thru stoma, diagnostic  Oct 2009     Dr López- normal      ugi endoscopy, simple exam  11/11/2009     attempted upper GI, pt. could not tolerate procedure:[facility]      ugi endoscopy diag w biopsy  11/11/09     Normal esophagus     Orthopedic surgery  October 19,2011     diskectomy L4-L5     Current Outpatient Prescriptions   Medication Sig Dispense Refill     medical cannabis inhalation (Patient's own supply.  Not a prescription) Inhale into the lungs 3 times daily as needed (This is NOT a prescription, and does not certify that the patient has a qualifying medical condition for medical cannabis.  The purpose of this order is  to document that the patient reports taking medical cannabis.)       ondansetron (ZOFRAN ODT) 4 MG ODT tab Take 1-2 tablets (4-8 mg) by mouth every 6 hours as needed for nausea Take 1-2 tablets by mouth as needed for nausea 30 tablet 1     HYDROcodone-acetaminophen (NORCO) 5-325 MG per tablet Take 1-2 tablets by mouth nightly as needed for moderate to severe pain 30 tablet 0     ARIPiprazole (ABILIFY) 2 MG tablet Take 1 tablet (2 mg) by mouth At Bedtime 90 tablet 3     SUMAtriptan (IMITREX) 50 MG tablet Take 1 tablet (50 mg) by mouth at onset of headache for migraine May repeat in 2 hours. Max 8 tablets/24 hours. 18 tablet 3     ondansetron (ZOFRAN) 4 MG tablet Take 1 tablet (4 mg) by mouth every 8 hours as needed for nausea 30 tablet 11     vitamin D (ERGOCALCIFEROL) 54054 UNIT capsule Take 1 capsule (50,000 Units) by mouth every 7 days for 8 doses 8 capsule 0     cyclobenzaprine (FLEXERIL) 10 MG tablet Take 1 tablet (10 mg) by mouth 2 times daily as needed for muscle spasms or other (back pain) 60 tablet 3     DULoxetine (CYMBALTA) 60 MG EC capsule Take 1 capsule (60 mg) by mouth daily 90 capsule 3     naproxen (NAPROSYN) 500 MG tablet Take 1 tablet (500 mg) by mouth 2 times daily as needed for moderate pain  180 tablet 1     gabapentin (NEURONTIN) 300 MG capsule Take 1 capsule (300 mg) by mouth 3 times daily 270 capsule 1     dexlansoprazole (DEXILANT) 60 MG CPDR CR capsule Take 1 capsule (60 mg) by mouth daily 30 capsule 3     fluticasone-salmeterol (ADVAIR) 100-50 MCG/DOSE diskus inhaler Inhale 1 puff into the lungs 2 times daily 1 Inhaler 5     sucralfate (CARAFATE) 1 GM tablet Take by mouth 4 times daily       promethazine (PHENERGAN) 25 MG tablet Take by mouth every 6 hours as needed for nausea       EPINEPHrine (EPIPEN) 0.3 MG/0.3ML injection Inject 0.3 mLs (0.3 mg) into the muscle once as needed for anaphylaxis 0.6 mL 1     dicyclomine (BENTYL) 20 MG tablet Take 1 tablet (20 mg) by mouth 4 times daily as needed (ABDOMINAL CRAMPING) AS NEEDED FOR CRAMPING 30 tablet 0     albuterol (PROAIR HFA, PROVENTIL HFA, VENTOLIN HFA) 108 (90 BASE) MCG/ACT inhaler Inhale 2 puffs into the lungs every 6 hours as needed for shortness of breath / dyspnea or wheezing 3 Inhaler 1     fluticasone (FLONASE) 50 MCG/ACT nasal spray Spray 2 sprays into both nostrils daily 1 Package 5     ORDER FOR DME Equipment being ordered: Nebulizer 1 Units 0     Cholecalciferol (VITAMIN D-3) 5000 UNITS TABS Take by mouth daily       Misc Natural Products (FLEX-A-MIN JOINT FLEX PO) Take by mouth daily       fexofenadine (ALLEGRA) 180 MG tablet Take by mouth daily       Multiple Vitamin (MULTI-VITAMIN) per tablet Take 1 tablet by mouth daily.         OTC products: None, except as noted above    Allergies   Allergen Reactions     Baclofen      hives     Bees Hives, Swelling and Difficulty breathing     Caffeine      Contrast Dye      Hives,   Updated 5/10/2016 CT Contrast.     Diazepam      Other reaction(s): Other - Describe In Comment Field  Becomes aggressive and angry     Iodine Hives     Methocarbamol Swelling     Metoclopramide      Other reaction(s): Tremors  LW Reaction: shaking/sweating     Midazolam      Other reaction(s): Agitation      Monosodium Glutamate      Morphine Other (See Comments)     Difficulty with urination     Other (Do Not Use) Other (See Comments)     Xanaflex- pt becomes disoriented and loses bladder control     Reglan [Metoclopramide Hcl] Other (See Comments)     shaking     Soma [Carisoprodol] Visual Disturbance     Sleep walking     Tizanidine      Topamax Other (See Comments)     Topamax [Topiramate] Nausea     Tingling  GI/Vomit     Tramadol      Severe Headache, Seizure Risk     Tylenol W/Codeine [Acetaminophen-Codeine] Nausea and Itching     Tylenol 3     Valium Other (See Comments)     Becomes aggressive and angry     Versed Other (See Comments)     Zolmitriptan      Makes face feel like its twitching     Droperidol Anxiety     Flu Virus Vaccine Rash      Arm swelling      Latex Allergy: NO    Social History   Substance Use Topics     Smoking status: Former Smoker     Types: Cigarettes     Smokeless tobacco: Never Used      Comment: 3 cigarettes weekly     Alcohol Use: 0.6 oz/week     1 Cans of beer per week      Comment: 1-2 drinks montlhly     History   Drug Use     Yes     Comment: Medical Marijuana currently       REVIEW OF SYSTEMS:                                                    Constitutional, neuro, ENT, endocrine, pulmonary, cardiac, gastrointestinal, genitourinary, musculoskeletal, integument and psychiatric systems are negative, except as otherwise noted.    EXAM:                                                    /80 (BP Location: Left arm, Patient Position: Chair, Cuff Size: Adult Regular)  Pulse 95  Temp 98.7  F (37.1  C) (Oral)  Wt 147 lb (66.7 kg)  LMP 02/02/2012  SpO2 99%  BMI 26.89 kg/m2    GENERAL APPEARANCE: healthy, alert and no distress     EYES: EOMI,- PERRL     HENT: ear canals and TM's normal and nose and mouth without ulcers or lesions     NECK: no adenopathy, no asymmetry, masses, or scars and thyroid normal to palpation     RESP: lungs clear to auscultation - no rales, rhonchi or  wheezes     CV: regular rates and rhythm, normal S1 S2, no S3 or S4 and no murmur, click or rub -     ABDOMEN:  soft, nontender, no HSM or masses and bowel sounds normal     MS: extremities normal- no gross deformities noted, no evidence of inflammation in joints, FROM in all extremities.     SKIN: no suspicious lesions or rashes     NEURO: Normal strength and tone, sensory exam grossly normal, mentation intact and speech normal     PSYCH: mentation appears normal. and affect normal/bright    DIAGNOSTICS:                                                      Per surgeon's orders, collected today  - MRSA nasal swab   - CBC  - BMP  - INR  - PTT    Also ordered  - Beta HCG       Labs Resulted Today:   Results for orders placed or performed in visit on 01/09/17   Vitamin B12   Result Value Ref Range    Vitamin B12 400 193 - 986 pg/mL   Iron and iron binding capacity   Result Value Ref Range    Iron 82 35 - 180 ug/dL    Iron Binding Cap 362 240 - 430 ug/dL    Iron Saturation Index 23 15 - 46 %   Ferritin   Result Value Ref Range    Ferritin 42 12 - 150 ng/mL   TSH with free T4 reflex   Result Value Ref Range    TSH 1.40 0.40 - 4.00 mU/L   Folate   Result Value Ref Range    Folate 16.2 >5.4 ng/mL     *Note: Due to a large number of results and/or encounters for the requested time period, some results have not been displayed. A complete set of results can be found in Results Review.     Labs Drawn and in Process:   Unresulted Labs Ordered in the Past 30 Days of this Admission     Date and Time Order Name Status Description    2/13/2017 1102 PARTIAL THROMBOPLASTIN TIME In process     2/13/2017 1102 INR In process     2/13/2017 1059 BASIC METABOLIC PANEL In process     2/13/2017 1052 25 HYDROXYVITAMIN D2 AND D3 In process     2/13/2017 1052 CBC WITH PLATELETS In process     2/13/2017 1051 BETA HCG QUAL IFA URINE In process           Recent Labs   Lab Test  01/08/17   2115  01/02/17   0845   06/06/12   1355   HGB  14.0  13.3    < >  11.1*   PLT  275  232   < >  263   INR  0.91   --    --   1.00   NA  146*  143   < >  139   POTASSIUM  4.1  4.0   < >  3.8   CR  0.74  0.78   < >  0.77    < > = values in this interval not displayed.        IMPRESSION:                                                    Reason for surgery/procedure: DDD lumber with bulge at L4, previous laminectomy, chronic back pain failed conservative measures   Diagnosis/reason for consult: Pre operative clearance     The proposed surgical procedure is considered INTERMEDIATE risk.    REVISED CARDIAC RISK INDEX  The patient has the following serious cardiovascular risks for perioperative complications such as (MI, PE, VFib and 3  AV Block):  No serious cardiac risks  INTERPRETATION: 0 risks: Class I (very low risk - 0.4% complication rate)    The patient has the following additional risks for perioperative complications:  No identified additional risks      ICD-10-CM    1. Preop general physical exam Z01.818 Beta HCG qual IFA urine     CBC with platelets     Basic metabolic panel   2. Pre-operative laboratory examination Z01.812 Methicillin Resistant Staph Aureus PCR   3. Left-sided low back pain without sciatica, unspecified chronicity M54.5 HYDROcodone-acetaminophen (NORCO) 5-325 MG per tablet   4. DDD (degenerative disc disease), lumbar M51.36    5. Gastroesophageal reflux disease without esophagitis K21.9 dexlansoprazole (DEXILANT) 60 MG CPDR CR capsule   6. Dysmenorrhea N94.6    7. Crohn's colitis, without complications (H) K50.10 INR     Partial thromboplastin time   8. Mild persistent asthma without complication J45.30    9. Elevated blood pressure reading without diagnosis of hypertension R03.0    10. Pain in both lower extremities  M79.604 Partial thromboplastin time    M79.605    11. Hearing loss, bilateral H91.93    12. Chronic migraine without aura without status migrainosus, not intractable G43.709    13. Major depressive disorder, recurrent episode, mild (H)  F33.0    14. Anxiety F41.9    15. Grand mal seizure disorder (H) G40.409    16. Bipolar affective disorder in remission (H) F31.70    17. Marijuana abuse F12.10    18. Vitamin D deficiency E55.9 25 OH Vit D therapy monitoring       RECOMMENDATIONS:                                                    --Consult hospital rounder / IM to assist post-op medical management    --Patient is to take all scheduled medications on the day of surgery EXCEPT for modifications listed below.      - Patient only takes Dexilant in the AM, rest of medications at night       - Patient instructed to take Dexilant in the AM the morning of procedure with small sip of water    - Patient instructed to stop all NSAIDs 7-10 days prior to surgery  - Patient instructed to abstain from unprotected sex prior to surgery     APPROVAL GIVEN to proceed with proposed procedure, pending further diagnostic evaluation. Addendum will be issued when all labs are resulted.        Signed Electronically by: Aura Rosario PA-C    Copy of this evaluation report is provided to requesting physician.    [place] Preop Guidelines      Answers for HPI/ROS submitted by the patient on 2/13/2017   If you checked off any problems, how difficult have these problems made it for you to do your work, take care of things at home, or get along with other people?: Not difficult at all  PHQ9 TOTAL SCORE: 1  JULIETH 7 TOTAL SCORE: 2

## 2017-02-22 ENCOUNTER — TELEPHONE (OUTPATIENT)
Dept: FAMILY MEDICINE | Facility: OTHER | Age: 27
End: 2017-02-22

## 2017-02-22 NOTE — LETTER
[facility]  [address]   [place] [address]  Phone: [phone]  February 22, 2017      Katy Islas  [address] [place] [facility] [address]      Dear Katy,    We care about your health and have reviewed your health plan including your medical conditions, medications, and lab results.  Based on this review, it is recommended that you follow up regarding the following health topic(s):  -Asthma    We recommend you take the following action(s):   -Complete and return the attached ASTHMA CONTROL TEST.  If your total score is 19 or less or you have been to the ER or urgent care for your asthma, then please schedule an asthma followup appointment.     Please call us at the [facility] - [phone] (or use [facility]) to address the above recommendations.     Thank you for trusting [facility] and we appreciate the opportunity to serve you.  We look forward to supporting your healthcare needs in the future.    Healthy Regards,    Your Health Care Team  [facility]

## 2017-02-22 NOTE — TELEPHONE ENCOUNTER
Summary:    Patient is due/failing the following:   ACT    Action needed:   Patient needs to do ACT.    Type of outreach:    Sent letter.    Questions for provider review:    None                                                                                                                                    Kellie Pelayo       Chart routed to Care Team .        Panel Management Review      Patient has the following on her problem list:     Depression / Dysthymia review  PHQ-9 SCORE 12/2/2016 12/30/2016 2/13/2017   Total Score - - -   Total Score MyChart - - 1 (Minimal depression)   Total Score 3 16 -      Patient is due for:  None    Asthma review     ACT Total Scores 12/2/2016   ACT TOTAL SCORE (Goal Greater than or Equal to 20) 16   In the past 12 months, how many times did you visit the emergency room for your asthma without being admitted to the hospital? 0   In the past 12 months, how many times were you hospitalized overnight because of your asthma? 0      1. Is Asthma diagnosis on the Problem List? Yes    2. Is Asthma listed on Health Maintenance? Yes    3. Patient is due for:  ACT      Composite cancer screening  Chart review shows that this patient is due/due soon for the following None

## 2017-02-23 ENCOUNTER — ANESTHESIA EVENT (OUTPATIENT)
Dept: SURGERY | Facility: CLINIC | Age: 27
DRG: 455 | End: 2017-02-23
Payer: MEDICARE

## 2017-02-23 ENCOUNTER — OFFICE VISIT (OUTPATIENT)
Dept: INTERPRETER SERVICES | Facility: CLINIC | Age: 27
End: 2017-02-23

## 2017-02-23 ENCOUNTER — ANESTHESIA (OUTPATIENT)
Dept: SURGERY | Facility: CLINIC | Age: 27
DRG: 455 | End: 2017-02-23
Payer: MEDICARE

## 2017-02-23 ENCOUNTER — HOSPITAL ENCOUNTER (INPATIENT)
Facility: CLINIC | Age: 27
LOS: 1 days | Discharge: HOME | DRG: 455 | End: 2017-02-24
Attending: NEUROLOGICAL SURGERY | Admitting: NEUROLOGICAL SURGERY
Payer: MEDICARE

## 2017-02-23 ENCOUNTER — APPOINTMENT (OUTPATIENT)
Dept: GENERAL RADIOLOGY | Facility: CLINIC | Age: 27
DRG: 455 | End: 2017-02-23
Attending: NEUROLOGICAL SURGERY
Payer: MEDICARE

## 2017-02-23 DIAGNOSIS — M54.42 CHRONIC BILATERAL LOW BACK PAIN WITH LEFT-SIDED SCIATICA: ICD-10-CM

## 2017-02-23 DIAGNOSIS — M51.16 LUMBAR DISC DISEASE WITH RADICULOPATHY: Primary | ICD-10-CM

## 2017-02-23 DIAGNOSIS — G89.29 CHRONIC BILATERAL LOW BACK PAIN WITH LEFT-SIDED SCIATICA: ICD-10-CM

## 2017-02-23 LAB
ABO + RH BLD: NORMAL
ABO + RH BLD: NORMAL
BLD GP AB SCN SERPL QL: NORMAL
BLOOD BANK CMNT PATIENT-IMP: NORMAL
HCG SERPL QL: NEGATIVE
SPECIMEN EXP DATE BLD: NORMAL

## 2017-02-23 PROCEDURE — 25000128 H RX IP 250 OP 636

## 2017-02-23 PROCEDURE — T1013 SIGN LANG/ORAL INTERPRETER: HCPCS | Mod: U3

## 2017-02-23 PROCEDURE — 25000125 ZZHC RX 250: Performed by: NURSE ANESTHETIST, CERTIFIED REGISTERED

## 2017-02-23 PROCEDURE — 37000008 ZZH ANESTHESIA TECHNICAL FEE, 1ST 30 MIN: Performed by: NEUROLOGICAL SURGERY

## 2017-02-23 PROCEDURE — 25000128 H RX IP 250 OP 636: Performed by: ANESTHESIOLOGY

## 2017-02-23 PROCEDURE — 25000128 H RX IP 250 OP 636: Performed by: NURSE ANESTHETIST, CERTIFIED REGISTERED

## 2017-02-23 PROCEDURE — 36000071 ZZH SURGERY LEVEL 5 W FLUORO 1ST 30 MIN: Performed by: NEUROLOGICAL SURGERY

## 2017-02-23 PROCEDURE — 0SG0071 FUSION OF LUMBAR VERTEBRAL JOINT WITH AUTOLOGOUS TISSUE SUBSTITUTE, POSTERIOR APPROACH, POSTERIOR COLUMN, OPEN APPROACH: ICD-10-PCS | Performed by: NEUROLOGICAL SURGERY

## 2017-02-23 PROCEDURE — 36000069 ZZH SURGERY LEVEL 5 EA 15 ADDTL MIN: Performed by: NEUROLOGICAL SURGERY

## 2017-02-23 PROCEDURE — 27211024 ZZHC OR SUPPLY OTHER OPNP: Performed by: NEUROLOGICAL SURGERY

## 2017-02-23 PROCEDURE — 99223 1ST HOSP IP/OBS HIGH 75: CPT | Performed by: CLINICAL NURSE SPECIALIST

## 2017-02-23 PROCEDURE — 36415 COLL VENOUS BLD VENIPUNCTURE: CPT | Performed by: ANESTHESIOLOGY

## 2017-02-23 PROCEDURE — 86901 BLOOD TYPING SEROLOGIC RH(D): CPT | Performed by: ANESTHESIOLOGY

## 2017-02-23 PROCEDURE — 25000125 ZZHC RX 250: Performed by: NEUROLOGICAL SURGERY

## 2017-02-23 PROCEDURE — 25800025 ZZH RX 258: Performed by: ANESTHESIOLOGY

## 2017-02-23 PROCEDURE — 71000012 ZZH RECOVERY PHASE 1 LEVEL 1 FIRST HR: Performed by: NEUROLOGICAL SURGERY

## 2017-02-23 PROCEDURE — 25000128 H RX IP 250 OP 636: Performed by: NEUROLOGICAL SURGERY

## 2017-02-23 PROCEDURE — 3E0R33Z INTRODUCTION OF ANTI-INFLAMMATORY INTO SPINAL CANAL, PERCUTANEOUS APPROACH: ICD-10-PCS | Performed by: NEUROLOGICAL SURGERY

## 2017-02-23 PROCEDURE — 27810322 ZZHC OR SPINE - CAGE/SPACER/DISK/CORD/CONNECTOR OPNP: Performed by: NEUROLOGICAL SURGERY

## 2017-02-23 PROCEDURE — 3E0R3BZ INTRODUCTION OF ANESTHETIC AGENT INTO SPINAL CANAL, PERCUTANEOUS APPROACH: ICD-10-PCS | Performed by: NEUROLOGICAL SURGERY

## 2017-02-23 PROCEDURE — 25000566 ZZH SEVOFLURANE, EA 15 MIN: Performed by: NEUROLOGICAL SURGERY

## 2017-02-23 PROCEDURE — A9270 NON-COVERED ITEM OR SERVICE: HCPCS | Mod: GY | Performed by: ANESTHESIOLOGY

## 2017-02-23 PROCEDURE — 95938 SOMATOSENSORY TESTING: CPT | Performed by: NEUROLOGICAL SURGERY

## 2017-02-23 PROCEDURE — 95870 NDL EMG LMTD STD MUSC 1 XTR: CPT | Performed by: NEUROLOGICAL SURGERY

## 2017-02-23 PROCEDURE — C1713 ANCHOR/SCREW BN/BN,TIS/BN: HCPCS | Performed by: NEUROLOGICAL SURGERY

## 2017-02-23 PROCEDURE — 12000007 ZZH R&B INTERMEDIATE

## 2017-02-23 PROCEDURE — 40000306 ZZH STATISTIC PRE PROC ASSESS II: Performed by: NEUROLOGICAL SURGERY

## 2017-02-23 PROCEDURE — 84703 CHORIONIC GONADOTROPIN ASSAY: CPT | Performed by: ANESTHESIOLOGY

## 2017-02-23 PROCEDURE — A9270 NON-COVERED ITEM OR SERVICE: HCPCS | Mod: GY | Performed by: NEUROLOGICAL SURGERY

## 2017-02-23 PROCEDURE — 86850 RBC ANTIBODY SCREEN: CPT | Performed by: ANESTHESIOLOGY

## 2017-02-23 PROCEDURE — 25000132 ZZH RX MED GY IP 250 OP 250 PS 637: Mod: GY | Performed by: CLINICAL NURSE SPECIALIST

## 2017-02-23 PROCEDURE — A9270 NON-COVERED ITEM OR SERVICE: HCPCS | Mod: GY | Performed by: CLINICAL NURSE SPECIALIST

## 2017-02-23 PROCEDURE — 27210794 ZZH OR GENERAL SUPPLY STERILE: Performed by: NEUROLOGICAL SURGERY

## 2017-02-23 PROCEDURE — 71000013 ZZH RECOVERY PHASE 1 LEVEL 1 EA ADDTL HR: Performed by: NEUROLOGICAL SURGERY

## 2017-02-23 PROCEDURE — 37000009 ZZH ANESTHESIA TECHNICAL FEE, EACH ADDTL 15 MIN: Performed by: NEUROLOGICAL SURGERY

## 2017-02-23 PROCEDURE — 0SB20ZZ EXCISION OF LUMBAR VERTEBRAL DISC, OPEN APPROACH: ICD-10-PCS | Performed by: NEUROLOGICAL SURGERY

## 2017-02-23 PROCEDURE — 25000132 ZZH RX MED GY IP 250 OP 250 PS 637: Mod: GY | Performed by: NEUROLOGICAL SURGERY

## 2017-02-23 PROCEDURE — 25000132 ZZH RX MED GY IP 250 OP 250 PS 637: Mod: GY | Performed by: ANESTHESIOLOGY

## 2017-02-23 PROCEDURE — 25000128 H RX IP 250 OP 636: Performed by: CLINICAL NURSE SPECIALIST

## 2017-02-23 PROCEDURE — 86900 BLOOD TYPING SEROLOGIC ABO: CPT | Performed by: ANESTHESIOLOGY

## 2017-02-23 PROCEDURE — 4A11X4G MONITORING OF PERIPHERAL NERVOUS ELECTRICAL ACTIVITY, INTRAOPERATIVE, EXTERNAL APPROACH: ICD-10-PCS | Performed by: NEUROLOGICAL SURGERY

## 2017-02-23 PROCEDURE — 25000125 ZZHC RX 250: Performed by: ANESTHESIOLOGY

## 2017-02-23 PROCEDURE — 40000277 XR SURGERY CARM FLUORO LESS THAN 5 MIN W STILLS: Mod: TC

## 2017-02-23 PROCEDURE — 0SG00A0 FUSION OF LUMBAR VERTEBRAL JOINT WITH INTERBODY FUSION DEVICE, ANTERIOR APPROACH, ANTERIOR COLUMN, OPEN APPROACH: ICD-10-PCS | Performed by: NEUROLOGICAL SURGERY

## 2017-02-23 DEVICE — IMPLANTABLE DEVICE: Type: IMPLANTABLE DEVICE | Site: SPINE LUMBAR | Status: FUNCTIONAL

## 2017-02-23 RX ORDER — PROPOFOL 10 MG/ML
INJECTION, EMULSION INTRAVENOUS PRN
Status: DISCONTINUED | OUTPATIENT
Start: 2017-02-23 | End: 2017-02-23

## 2017-02-23 RX ORDER — HYDROXYZINE HYDROCHLORIDE 25 MG/1
25 TABLET, FILM COATED ORAL EVERY 6 HOURS PRN
Status: DISCONTINUED | OUTPATIENT
Start: 2017-02-23 | End: 2017-02-24 | Stop reason: HOSPADM

## 2017-02-23 RX ORDER — HYDROXYZINE HYDROCHLORIDE 50 MG/1
50 TABLET, FILM COATED ORAL EVERY 6 HOURS PRN
Status: DISCONTINUED | OUTPATIENT
Start: 2017-02-23 | End: 2017-02-24 | Stop reason: HOSPADM

## 2017-02-23 RX ORDER — OXYCODONE HYDROCHLORIDE 5 MG/1
5-10 TABLET ORAL
Status: DISCONTINUED | OUTPATIENT
Start: 2017-02-23 | End: 2017-02-23

## 2017-02-23 RX ORDER — AMOXICILLIN 250 MG
1-2 CAPSULE ORAL 2 TIMES DAILY
Status: DISCONTINUED | OUTPATIENT
Start: 2017-02-23 | End: 2017-02-24 | Stop reason: HOSPADM

## 2017-02-23 RX ORDER — DIPHENHYDRAMINE HYDROCHLORIDE 50 MG/ML
50 INJECTION INTRAMUSCULAR; INTRAVENOUS ONCE
Status: COMPLETED | OUTPATIENT
Start: 2017-02-23 | End: 2017-02-23

## 2017-02-23 RX ORDER — ACETAMINOPHEN 325 MG/1
650 TABLET ORAL EVERY 4 HOURS PRN
Status: DISCONTINUED | OUTPATIENT
Start: 2017-02-26 | End: 2017-02-24 | Stop reason: HOSPADM

## 2017-02-23 RX ORDER — PROMETHAZINE HYDROCHLORIDE 25 MG/1
25 TABLET ORAL EVERY 6 HOURS PRN
Status: DISCONTINUED | OUTPATIENT
Start: 2017-02-23 | End: 2017-02-24 | Stop reason: HOSPADM

## 2017-02-23 RX ORDER — SUMATRIPTAN 50 MG/1
50 TABLET, FILM COATED ORAL
Status: DISCONTINUED | OUTPATIENT
Start: 2017-02-23 | End: 2017-02-24 | Stop reason: HOSPADM

## 2017-02-23 RX ORDER — ACETAMINOPHEN 325 MG/1
975 TABLET ORAL EVERY 8 HOURS
Status: DISCONTINUED | OUTPATIENT
Start: 2017-02-23 | End: 2017-02-24 | Stop reason: HOSPADM

## 2017-02-23 RX ORDER — CYCLOBENZAPRINE HCL 10 MG
10 TABLET ORAL 3 TIMES DAILY PRN
Status: DISCONTINUED | OUTPATIENT
Start: 2017-02-23 | End: 2017-02-23

## 2017-02-23 RX ORDER — MEPERIDINE HYDROCHLORIDE 25 MG/ML
12.5 INJECTION INTRAMUSCULAR; INTRAVENOUS; SUBCUTANEOUS
Status: DISCONTINUED | OUTPATIENT
Start: 2017-02-23 | End: 2017-02-23 | Stop reason: HOSPADM

## 2017-02-23 RX ORDER — SODIUM CHLORIDE AND POTASSIUM CHLORIDE 150; 450 MG/100ML; MG/100ML
INJECTION, SOLUTION INTRAVENOUS CONTINUOUS
Status: DISCONTINUED | OUTPATIENT
Start: 2017-02-23 | End: 2017-02-24 | Stop reason: HOSPADM

## 2017-02-23 RX ORDER — DIAZEPAM 5 MG
5 TABLET ORAL EVERY 6 HOURS PRN
Status: DISCONTINUED | OUTPATIENT
Start: 2017-02-23 | End: 2017-02-23

## 2017-02-23 RX ORDER — DICYCLOMINE HCL 20 MG
20 TABLET ORAL 4 TIMES DAILY PRN
Status: DISCONTINUED | OUTPATIENT
Start: 2017-02-23 | End: 2017-02-23

## 2017-02-23 RX ORDER — GABAPENTIN 300 MG/1
300 CAPSULE ORAL 3 TIMES DAILY
Status: DISCONTINUED | OUTPATIENT
Start: 2017-02-23 | End: 2017-02-24 | Stop reason: HOSPADM

## 2017-02-23 RX ORDER — NALOXONE HYDROCHLORIDE 0.4 MG/ML
.1-.4 INJECTION, SOLUTION INTRAMUSCULAR; INTRAVENOUS; SUBCUTANEOUS
Status: DISCONTINUED | OUTPATIENT
Start: 2017-02-23 | End: 2017-02-24 | Stop reason: HOSPADM

## 2017-02-23 RX ORDER — ACETAMINOPHEN 10 MG/ML
1000 INJECTION, SOLUTION INTRAVENOUS ONCE
Status: COMPLETED | OUTPATIENT
Start: 2017-02-23 | End: 2017-02-23

## 2017-02-23 RX ORDER — HYDROMORPHONE HYDROCHLORIDE 2 MG/1
2-4 TABLET ORAL
Status: DISCONTINUED | OUTPATIENT
Start: 2017-02-23 | End: 2017-02-24 | Stop reason: HOSPADM

## 2017-02-23 RX ORDER — CEFAZOLIN SODIUM 2 G/100ML
2 INJECTION, SOLUTION INTRAVENOUS
Status: COMPLETED | OUTPATIENT
Start: 2017-02-23 | End: 2017-02-23

## 2017-02-23 RX ORDER — PANTOPRAZOLE SODIUM 40 MG/1
40 TABLET, DELAYED RELEASE ORAL DAILY
Status: DISCONTINUED | OUTPATIENT
Start: 2017-02-24 | End: 2017-02-24 | Stop reason: HOSPADM

## 2017-02-23 RX ORDER — SODIUM CHLORIDE, SODIUM LACTATE, POTASSIUM CHLORIDE, CALCIUM CHLORIDE 600; 310; 30; 20 MG/100ML; MG/100ML; MG/100ML; MG/100ML
INJECTION, SOLUTION INTRAVENOUS CONTINUOUS
Status: DISCONTINUED | OUTPATIENT
Start: 2017-02-23 | End: 2017-02-23 | Stop reason: HOSPADM

## 2017-02-23 RX ORDER — HYDROXYZINE HYDROCHLORIDE 50 MG/1
50 TABLET, FILM COATED ORAL ONCE
Status: DISCONTINUED | OUTPATIENT
Start: 2017-02-23 | End: 2017-02-23 | Stop reason: HOSPADM

## 2017-02-23 RX ORDER — LIDOCAINE 40 MG/G
CREAM TOPICAL
Status: DISCONTINUED | OUTPATIENT
Start: 2017-02-23 | End: 2017-02-23 | Stop reason: HOSPADM

## 2017-02-23 RX ORDER — PROPOFOL 10 MG/ML
INJECTION, EMULSION INTRAVENOUS CONTINUOUS PRN
Status: DISCONTINUED | OUTPATIENT
Start: 2017-02-23 | End: 2017-02-23

## 2017-02-23 RX ORDER — LIDOCAINE HYDROCHLORIDE 10 MG/ML
INJECTION, SOLUTION INFILTRATION; PERINEURAL PRN
Status: DISCONTINUED | OUTPATIENT
Start: 2017-02-23 | End: 2017-02-23

## 2017-02-23 RX ORDER — FENTANYL CITRATE 50 UG/ML
25-50 INJECTION, SOLUTION INTRAMUSCULAR; INTRAVENOUS
Status: DISCONTINUED | OUTPATIENT
Start: 2017-02-23 | End: 2017-02-23 | Stop reason: HOSPADM

## 2017-02-23 RX ORDER — ARIPIPRAZOLE 2 MG/1
2 TABLET ORAL AT BEDTIME
Status: DISCONTINUED | OUTPATIENT
Start: 2017-02-23 | End: 2017-02-24 | Stop reason: HOSPADM

## 2017-02-23 RX ORDER — NALOXONE HYDROCHLORIDE 0.4 MG/ML
.1-.4 INJECTION, SOLUTION INTRAMUSCULAR; INTRAVENOUS; SUBCUTANEOUS
Status: DISCONTINUED | OUTPATIENT
Start: 2017-02-23 | End: 2017-02-23

## 2017-02-23 RX ORDER — ALBUTEROL SULFATE 90 UG/1
2 AEROSOL, METERED RESPIRATORY (INHALATION) EVERY 6 HOURS PRN
Status: DISCONTINUED | OUTPATIENT
Start: 2017-02-23 | End: 2017-02-24 | Stop reason: HOSPADM

## 2017-02-23 RX ORDER — ONDANSETRON 2 MG/ML
4 INJECTION INTRAMUSCULAR; INTRAVENOUS EVERY 6 HOURS PRN
Status: DISCONTINUED | OUTPATIENT
Start: 2017-02-23 | End: 2017-02-24 | Stop reason: HOSPADM

## 2017-02-23 RX ORDER — FENTANYL CITRATE 50 UG/ML
INJECTION, SOLUTION INTRAMUSCULAR; INTRAVENOUS PRN
Status: DISCONTINUED | OUTPATIENT
Start: 2017-02-23 | End: 2017-02-23

## 2017-02-23 RX ORDER — POLYETHYLENE GLYCOL 3350 17 G/17G
17 POWDER, FOR SOLUTION ORAL DAILY PRN
Status: DISCONTINUED | OUTPATIENT
Start: 2017-02-23 | End: 2017-02-24 | Stop reason: HOSPADM

## 2017-02-23 RX ORDER — ONDANSETRON 4 MG/1
4 TABLET, ORALLY DISINTEGRATING ORAL EVERY 30 MIN PRN
Status: DISCONTINUED | OUTPATIENT
Start: 2017-02-23 | End: 2017-02-23 | Stop reason: HOSPADM

## 2017-02-23 RX ORDER — LIDOCAINE 40 MG/G
CREAM TOPICAL
Status: DISCONTINUED | OUTPATIENT
Start: 2017-02-23 | End: 2017-02-24 | Stop reason: HOSPADM

## 2017-02-23 RX ORDER — DIAZEPAM 10 MG/2ML
5 INJECTION, SOLUTION INTRAMUSCULAR; INTRAVENOUS EVERY 4 HOURS PRN
Status: DISCONTINUED | OUTPATIENT
Start: 2017-02-23 | End: 2017-02-24

## 2017-02-23 RX ORDER — ONDANSETRON 4 MG/1
4 TABLET, ORALLY DISINTEGRATING ORAL EVERY 6 HOURS PRN
Status: DISCONTINUED | OUTPATIENT
Start: 2017-02-23 | End: 2017-02-24 | Stop reason: HOSPADM

## 2017-02-23 RX ORDER — ONDANSETRON 2 MG/ML
INJECTION INTRAMUSCULAR; INTRAVENOUS PRN
Status: DISCONTINUED | OUTPATIENT
Start: 2017-02-23 | End: 2017-02-23

## 2017-02-23 RX ORDER — BUPIVACAINE HYDROCHLORIDE 7.5 MG/ML
INJECTION, SOLUTION EPIDURAL; RETROBULBAR PRN
Status: DISCONTINUED | OUTPATIENT
Start: 2017-02-23 | End: 2017-02-23 | Stop reason: HOSPADM

## 2017-02-23 RX ORDER — DIPHENHYDRAMINE HCL 25 MG
25-50 CAPSULE ORAL EVERY 4 HOURS PRN
Status: DISCONTINUED | OUTPATIENT
Start: 2017-02-23 | End: 2017-02-24 | Stop reason: HOSPADM

## 2017-02-23 RX ORDER — GLYCOPYRROLATE 0.2 MG/ML
INJECTION, SOLUTION INTRAMUSCULAR; INTRAVENOUS PRN
Status: DISCONTINUED | OUTPATIENT
Start: 2017-02-23 | End: 2017-02-23

## 2017-02-23 RX ORDER — LABETALOL HYDROCHLORIDE 5 MG/ML
10 INJECTION, SOLUTION INTRAVENOUS
Status: DISCONTINUED | OUTPATIENT
Start: 2017-02-23 | End: 2017-02-23 | Stop reason: HOSPADM

## 2017-02-23 RX ORDER — SUCRALFATE 1 G/1
1 TABLET ORAL 4 TIMES DAILY
Status: DISCONTINUED | OUTPATIENT
Start: 2017-02-23 | End: 2017-02-24 | Stop reason: HOSPADM

## 2017-02-23 RX ORDER — KETOROLAC TROMETHAMINE 30 MG/ML
30 INJECTION, SOLUTION INTRAMUSCULAR; INTRAVENOUS EVERY 6 HOURS
Status: DISCONTINUED | OUTPATIENT
Start: 2017-02-23 | End: 2017-02-24 | Stop reason: HOSPADM

## 2017-02-23 RX ORDER — CYCLOBENZAPRINE HCL 10 MG
10 TABLET ORAL 3 TIMES DAILY
Status: DISCONTINUED | OUTPATIENT
Start: 2017-02-23 | End: 2017-02-23

## 2017-02-23 RX ORDER — LIDOCAINE 50 MG/G
2 PATCH TOPICAL
Status: DISCONTINUED | OUTPATIENT
Start: 2017-02-23 | End: 2017-02-24

## 2017-02-23 RX ORDER — ONDANSETRON 2 MG/ML
4 INJECTION INTRAMUSCULAR; INTRAVENOUS EVERY 30 MIN PRN
Status: DISCONTINUED | OUTPATIENT
Start: 2017-02-23 | End: 2017-02-23 | Stop reason: HOSPADM

## 2017-02-23 RX ORDER — CEFAZOLIN SODIUM 1 G/3ML
1 INJECTION, POWDER, FOR SOLUTION INTRAMUSCULAR; INTRAVENOUS SEE ADMIN INSTRUCTIONS
Status: DISCONTINUED | OUTPATIENT
Start: 2017-02-23 | End: 2017-02-23 | Stop reason: HOSPADM

## 2017-02-23 RX ORDER — DEXAMETHASONE SODIUM PHOSPHATE 4 MG/ML
INJECTION, SOLUTION INTRA-ARTICULAR; INTRALESIONAL; INTRAMUSCULAR; INTRAVENOUS; SOFT TISSUE PRN
Status: DISCONTINUED | OUTPATIENT
Start: 2017-02-23 | End: 2017-02-23

## 2017-02-23 RX ORDER — HYDROMORPHONE HYDROCHLORIDE 1 MG/ML
INJECTION, SOLUTION INTRAMUSCULAR; INTRAVENOUS; SUBCUTANEOUS
Status: COMPLETED
Start: 2017-02-23 | End: 2017-02-23

## 2017-02-23 RX ORDER — DULOXETIN HYDROCHLORIDE 60 MG/1
60 CAPSULE, DELAYED RELEASE ORAL DAILY
Status: DISCONTINUED | OUTPATIENT
Start: 2017-02-23 | End: 2017-02-24 | Stop reason: HOSPADM

## 2017-02-23 RX ORDER — NALOXONE HYDROCHLORIDE 0.4 MG/ML
.1-.4 INJECTION, SOLUTION INTRAMUSCULAR; INTRAVENOUS; SUBCUTANEOUS
Status: DISCONTINUED | OUTPATIENT
Start: 2017-02-23 | End: 2017-02-23 | Stop reason: HOSPADM

## 2017-02-23 RX ORDER — FLUTICASONE PROPIONATE 50 MCG
2 SPRAY, SUSPENSION (ML) NASAL DAILY
Status: DISCONTINUED | OUTPATIENT
Start: 2017-02-23 | End: 2017-02-24 | Stop reason: HOSPADM

## 2017-02-23 RX ADMIN — KETOROLAC TROMETHAMINE 30 MG: 30 INJECTION, SOLUTION INTRAMUSCULAR at 22:37

## 2017-02-23 RX ADMIN — ONDANSETRON 4 MG: 2 INJECTION INTRAMUSCULAR; INTRAVENOUS at 12:51

## 2017-02-23 RX ADMIN — HYDROMORPHONE HYDROCHLORIDE 0.5 MG: 1 INJECTION, SOLUTION INTRAMUSCULAR; INTRAVENOUS; SUBCUTANEOUS at 15:38

## 2017-02-23 RX ADMIN — FENTANYL CITRATE 150 MCG: 50 INJECTION, SOLUTION INTRAMUSCULAR; INTRAVENOUS at 12:08

## 2017-02-23 RX ADMIN — PROPOFOL 200 MG: 10 INJECTION, EMULSION INTRAVENOUS at 12:08

## 2017-02-23 RX ADMIN — PROPOFOL 100 MG: 10 INJECTION, EMULSION INTRAVENOUS at 12:30

## 2017-02-23 RX ADMIN — FENTANYL CITRATE 50 MCG: 50 INJECTION INTRAMUSCULAR; INTRAVENOUS at 13:57

## 2017-02-23 RX ADMIN — GABAPENTIN 300 MG: 300 CAPSULE ORAL at 20:11

## 2017-02-23 RX ADMIN — MIDAZOLAM HYDROCHLORIDE 2 MG: 1 INJECTION, SOLUTION INTRAMUSCULAR; INTRAVENOUS at 11:55

## 2017-02-23 RX ADMIN — PHENYLEPHRINE HYDROCHLORIDE 100 MCG: 10 INJECTION, SOLUTION INTRAMUSCULAR; INTRAVENOUS; SUBCUTANEOUS at 12:56

## 2017-02-23 RX ADMIN — DIPHENHYDRAMINE HYDROCHLORIDE 50 MG: 50 INJECTION, SOLUTION INTRAMUSCULAR; INTRAVENOUS at 14:14

## 2017-02-23 RX ADMIN — LIDOCAINE 2 PATCH: 50 PATCH CUTANEOUS at 20:11

## 2017-02-23 RX ADMIN — GLYCOPYRROLATE 0.2 MG: 0.2 INJECTION, SOLUTION INTRAMUSCULAR; INTRAVENOUS at 12:08

## 2017-02-23 RX ADMIN — SODIUM CHLORIDE, POTASSIUM CHLORIDE, SODIUM LACTATE AND CALCIUM CHLORIDE: 600; 310; 30; 20 INJECTION, SOLUTION INTRAVENOUS at 12:00

## 2017-02-23 RX ADMIN — PHENYLEPHRINE HYDROCHLORIDE 100 MCG: 10 INJECTION, SOLUTION INTRAMUSCULAR; INTRAVENOUS; SUBCUTANEOUS at 13:07

## 2017-02-23 RX ADMIN — DULOXETINE 60 MG: 60 CAPSULE, DELAYED RELEASE ORAL at 20:11

## 2017-02-23 RX ADMIN — SENNOSIDES AND DOCUSATE SODIUM 1 TABLET: 8.6; 5 TABLET ORAL at 20:11

## 2017-02-23 RX ADMIN — SODIUM CHLORIDE, POTASSIUM CHLORIDE, SODIUM LACTATE AND CALCIUM CHLORIDE: 600; 310; 30; 20 INJECTION, SOLUTION INTRAVENOUS at 12:53

## 2017-02-23 RX ADMIN — HYDROMORPHONE HYDROCHLORIDE 0.5 MG: 1 INJECTION, SOLUTION INTRAMUSCULAR; INTRAVENOUS; SUBCUTANEOUS at 14:03

## 2017-02-23 RX ADMIN — POTASSIUM CHLORIDE AND SODIUM CHLORIDE: 450; 150 INJECTION, SOLUTION INTRAVENOUS at 17:32

## 2017-02-23 RX ADMIN — FENTANYL CITRATE 50 MCG: 50 INJECTION INTRAMUSCULAR; INTRAVENOUS at 14:01

## 2017-02-23 RX ADMIN — CEFAZOLIN SODIUM 2 G: 2 INJECTION, SOLUTION INTRAVENOUS at 12:00

## 2017-02-23 RX ADMIN — Medication: at 22:28

## 2017-02-23 RX ADMIN — CEFAZOLIN SODIUM 1 G: 1 INJECTION, SOLUTION INTRAVENOUS at 20:10

## 2017-02-23 RX ADMIN — DIPHENHYDRAMINE HYDROCHLORIDE 25 MG: 25 CAPSULE ORAL at 21:10

## 2017-02-23 RX ADMIN — ACETAMINOPHEN 1000 MG: 10 INJECTION, SOLUTION INTRAVENOUS at 14:27

## 2017-02-23 RX ADMIN — FENTANYL CITRATE 50 MCG: 50 INJECTION, SOLUTION INTRAMUSCULAR; INTRAVENOUS at 12:51

## 2017-02-23 RX ADMIN — HYDROMORPHONE HYDROCHLORIDE 0.5 MG: 1 INJECTION, SOLUTION INTRAMUSCULAR; INTRAVENOUS; SUBCUTANEOUS at 13:53

## 2017-02-23 RX ADMIN — SUCCINYLCHOLINE CHLORIDE 60 MG: 20 INJECTION, SOLUTION INTRAMUSCULAR; INTRAVENOUS at 12:08

## 2017-02-23 RX ADMIN — DEXAMETHASONE SODIUM PHOSPHATE 4 MG: 4 INJECTION, SOLUTION INTRAMUSCULAR; INTRAVENOUS at 12:08

## 2017-02-23 RX ADMIN — LIDOCAINE HYDROCHLORIDE 3 MG: 10 INJECTION, SOLUTION INFILTRATION; PERINEURAL at 12:08

## 2017-02-23 RX ADMIN — PROPOFOL 100 MCG/KG/MIN: 10 INJECTION, EMULSION INTRAVENOUS at 12:11

## 2017-02-23 RX ADMIN — ARIPIPRAZOLE 2 MG: 2 TABLET ORAL at 20:11

## 2017-02-23 RX ADMIN — KETOROLAC TROMETHAMINE 30 MG: 30 INJECTION, SOLUTION INTRAMUSCULAR at 16:39

## 2017-02-23 RX ADMIN — DIAZEPAM 5 MG: 5 INJECTION, SOLUTION INTRAMUSCULAR; INTRAVENOUS at 18:44

## 2017-02-23 RX ADMIN — PHENYLEPHRINE HYDROCHLORIDE 100 MCG: 10 INJECTION, SOLUTION INTRAMUSCULAR; INTRAVENOUS; SUBCUTANEOUS at 13:00

## 2017-02-23 RX ADMIN — HYDROMORPHONE HYDROCHLORIDE 1 MG: 1 INJECTION, SOLUTION INTRAMUSCULAR; INTRAVENOUS; SUBCUTANEOUS at 12:26

## 2017-02-23 RX ADMIN — HYDROMORPHONE HYDROCHLORIDE: 10 INJECTION, SOLUTION INTRAMUSCULAR; INTRAVENOUS; SUBCUTANEOUS at 17:03

## 2017-02-23 RX ADMIN — HYDROMORPHONE HYDROCHLORIDE 0.5 MG: 1 INJECTION, SOLUTION INTRAMUSCULAR; INTRAVENOUS; SUBCUTANEOUS at 16:26

## 2017-02-23 RX ADMIN — FENTANYL CITRATE 100 MCG: 50 INJECTION, SOLUTION INTRAMUSCULAR; INTRAVENOUS at 12:30

## 2017-02-23 RX ADMIN — FENTANYL CITRATE 50 MCG: 50 INJECTION INTRAMUSCULAR; INTRAVENOUS at 14:10

## 2017-02-23 NOTE — ANESTHESIA POSTPROCEDURE EVALUATION
Patient: Katy Islas    Procedure(s):  L4-5 OLIF  - Wound Class: I-Clean    Diagnosis:DDD  Diagnosis Additional Information: PRE-PROCEDURE DIAGNOSIS:  1) L4/5 degenerative disc disease.        POST-PROCEDURE DIAGNOSIS:  1) Same as above     PROCEDURE PERFORMED:  1) L4/5 oblique lateral lumbar interbody fusion with discectomy, preparation of the endplate and placement of a , bullet cage packed with calcium triphosphate soaked in bone marrow anterior to the transverse process in modified prone position, with intraoperative biplanar fluoroscopic imaging and electrophysiological monitoring.     2) L4/5 Posterior minimally i, nvasive pedicle screw placement and posterolateral instrumentation and fusion with intraoperative biplanar fluoroscopic imaging and electrophysiological monitoring.     3) Epidural steroid injection.     4) Transpedicular Bone marrow aspiration       Anesthesia Type:  General    Note:  Anesthesia Post Evaluation    Patient location during evaluation: PACU  Patient participation: Able to fully participate in evaluation  Level of consciousness: awake and alert  Pain management: adequate  Airway patency: patent  Cardiovascular status: acceptable  Respiratory status: acceptable  Hydration status: acceptable  PONV: none     Anesthetic complications: None          Last vitals:  Vitals:    02/23/17 1615 02/23/17 1629 02/23/17 1703   BP:  133/72 117/75   Resp: 20 20 18   Temp:  98.1  F (36.7  C)    SpO2: 96% 97% 98%         Electronically Signed By: Alvaro Conklin MD  February 23, 2017  5:44 PM

## 2017-02-23 NOTE — CONSULTS
[facility]  Pain Service Consultation   Text Page    Date of Admission:  2/23/2017    Assessment & Plan   Katy Islas is a 26 year old female who was admitted on 2/23/2017. I was asked by Neurosurgeon Jeniffer Eugene MD to see the patient for pain management.    Appreciate assistance of the patient's mother and  in completing assessment. Patient repeatedly contradicted the information noted in her allergy module. She was direct in stating that she is pain tolerant and asked for a milligram of IV Dilaudid for pain. I explained that her recent use of opiates should be our first determination of opiate use. She indicates having severe spasms. I offered several adjuvants for muscle spasms she states that IV Valium has been the best and exclaimed that Flexeril has made her psychotic. She also indicates that her allergy to Ativan was only an IV site irritation and does not have a true allergic response. Her mother agrees with her claims. Her mother acknowledge her previous psychiatric history and miss use of pain medications.     1) Acute on chronic low back pain with left leg radiculopathy due to L4-5 degenerative disc disease, lateral recess and foraminal stenosis status post L4-5 fusion and discectomy   History of L4-L5 discectomy 10/2011    2)  Patient with chronic low back pain, on chronic opioid therapy managed by PCP Aura Rosario ([place], [place])   [facility] database review: 10 different prescribers during the past year - most recent prescriptions from PCP Hydrocodone-Acetaminophen 5/325 mg (30 tablets obtained 2/13/2017) Lorazepam 0.5 mg 1/9/2017   Patient indicates she could take 2 Hydrocodone-Acetaminophen 5/325 mg at bedtime and none during the day  10 mg Daily Morphine Equivalent  Patient proclaims repeatedly that she has a high opiate tolerance, yet she states taking only 2 Hydrocodone acetaminophen 5/325 mg at bedtime which would indicate a low  opiate tolerance.     Patient's opioid use thus far: 450 mcg IV Fentanyl and Dilaudid 3 mg = 195 mg Daily Morphine Equivalent    3)  Opioid induced side-effects:  -Constipation - NO  -Nausea/Vomit - NO  -Sedation- No  - itching - YES    4) Other/Related:  Arthritis  Fibromyalgia  Anxiety/Depression with borderline personality disorder  Epilepsy    -Disease of addiction  - 72 hour hold 10/13/2013 after agitated when demands for pain medication were not met. Age 13 psychiatric hospitalization for overdose     PLAN:   1) Dilaudid PCA over night and then convert to oral Dilaudid    2)  Tylenol 975 mg every 8 hours    3)Multimodal Medication Therapy  Topical: Lidoderm patch   NSAIDS: Ketorolac 30 mg every 6 hours as ordered by surgeon  Muscle Relaxants: Valium IV 5 mg  prn (Bentyl 20 mg QID prn for abdominal cramping)   Adjuvants: Imitrex 50 mg for headache  Antidepresants/anxiolytics: Cymbalta EC 60 mg Daily; Abilify 2 mg at bedtime  Opioids: Oxycodone 5-10 mg every 3 hours prn    4)Non-medication interventions  Ice  Rest alternating with physical therapy    5)Constipation Prophylaxis  Senna - S BID; Claude Lax prn    6) DC safety  Will discuss with PCP before making dismissal plan      Time Spent on this Encounter   I spent  40 minutes is assessment of the patient and discussion with the patient and family.  Another 35 minutes in review of chart, documentation and discussion with the health care team.    Kavita Munson MS, RN, CNS, APRN, ACHPN, FAACVPR  Pain and Palliative Care  Pager [phone]  Office [phone]     Reason for Consult   Reason for consult: I was asked by neurosurgeon Jeniffer Eugene MD to see the patient for pain management.    Primary Care Physician   Primary Care Physician:Aura Rosario  Pain Specialist:  NONE    Chief Complaint   Low back pain admitted for surgery with Dr. Eugene    History is obtained from the patient, electronic health record and patient's parents    History of  Present Illness   Katy Islas is a 26 year old female with severe back pain and radiculopathy not responding to usual conservative therapy. She was admitted for surgery with Dr. Keller    CURRENT PAIN:  Her pain is located in the low left back with radiation down left leg  It is described as Exhausting, Nagging, Sharp and Shooting  She rates it as ranging between 2/10 and 10/10  The average is 10/10 on a scale of 0-10  Currently it is rated as 10/10  It improves by taking pain medication  It worsens by movement  She has been compliant with the recommendations while in the hospital.    PAST PAIN TREATMENT:   Medications: Naproxen 500 mg; Cymbalta 60 mg; Abilify 2 mg; Flexeril 10 mg; Gabapentin 300 mg    Non-pharmacologic modalities: Chiropractic and Physical Therapy  Previous interventions/surgeries: Diskectomy 10/2011           D.I.R.E Score: Patient Selection for Chronic Opioid Analgesia    For each factor, rate the patient's score from 1 - 3 based on the explanations on the right.       Diagnosis             2         1 = Benign chronic condition with minimal objective findings or no definite medical diagnosis.  Examples:  fibromyalgia, migraine, headaches, non-specific back pain.  2 = Slowly progressive condition concordant with moderate pain, or fixed condition with moderate objective findings.  Examples: failed back surgery syndrome, back pain with moderate degenerative changes, neuropathic pain.  3 = Advanced condition concordant with severe pain with objective findings.  Examples: severe ischemic vascular disease, advanced neuropathy, severe spinal stenosis.    Intractability             2         1 = Few therapies have been tried and the patient takes a passive role in his/her pain management process.   2 = Most costmary treatments have been tried but the patient is not fully engaged in the pain management process, or barriers prevent (insurance, transportation, medical illness)  3 = Patient fully  engaged in a spectrum of appropriate treatments but with inadequate response.    Risk   (Risk = Total of P+C+R+S below)       Psychological             1         1 = Serious personality dysfunction or mental illness interfering with care.  Examples: personality disorder, severe affective disorder, significant personality issues.  2 = Personality or mental health interferes moderately.  Example: depression or anxiety disorder.  3 = Good communication with the clinic.  No significant personality dysfunction or mental illness.       Chemical      Health             2         1 = Active or very recent use of illicit drugs, excessive alcohol, or prescription drug abuse.  2 = Chemical coper (uses medications to cope with stress) or history of chemical dependency in remission.  3 = No CD history.  Not drug-focused or chemically reliant       Reliability             1         1 = History of numerous problems: medication misuse, missed appointments, rarely follows through.  2 = Occasional difficulties with compliance, but generally reliable.  3 = Highly reliable patient with medications, appointments and treatment.       Social      Support             3         1= Life in chaos.  Little family support and few close relationships.  Loss of most normal life roles.  2 = Reduction in some relationships and life roles.  3 = Supportive family/close relationships.  Involved in work or school and no social isolation.    Efficacy score             1         1 = Poor function or minimal pain relief despite moderate to high doses.  2 = Moderate benefit with function improved in a number of ways (or insufficient info - hasn't tried opioid yet or very low doses or too short a trial.  3 = Good improvement in pain and function and quality of life with stable doses over time.                                    11    Total score = D + I + R + E    Score 7-13: Not a suitable candidate for long-term opioid analgesia  Score 14-21: May be a  good candidate for long-term opioid analgesia    Copyright 2013 Pravin Santos MD, The DIRE Score: Predicting Outcomes of Opioid Prescribing for Chronic Pain. The Journal of Pain. 7(9) (September), 2006:671-681    Past Medical History   I have reviewed this patient's medical history and updated it with pertinent information if needed.   Past Medical History   Diagnosis Date     Abdominal pain 10/31- 11/4/2005     [facility] admit for [facility]     Allergic rhinitis, cause unspecified      Allergic rhinitis     Arthritis      C. difficile diarrhea      Past, no current diarrhea.     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, without mention of intractable epilepsy      pseudoseizures diagnosed after extensive neurologic eval     Migraine 07/21/12     D/C 07/22/12-[facility]     Migraine, unspecified, without mention of intractable migraine without mention of status migrainosus      Migraine     Mild intermittent asthma      mild intermittent     Mycoplasma infection in conditions classified elsewhere and of unspecified site      Other chronic pain      Back pain for 6 years     Renal disease      Kidney stones     Unspecified hearing loss      congenital hearing loss       Past Surgical History   I have reviewed this patient's surgical history and updated it with pertinent information if needed.  Past Surgical History   Procedure Laterality Date     Hc ugi endoscopy, simple exam  7/00, 10/00     mild chronic esophagitis and duodenitis, neg H pylori     Hc eeg awake and sleep       abnormal     Hc colonoscopy thru  stoma, diagnostic  10/00     normal     Hc mri brain w/o contrast  12/00     normal     Hc colonoscopy thru stoma with biopsy  10/29/2003     Impression is that of normal appearing colonoscopy, without evidence of rectal bleeding.     Hc ugi endoscopy, simple exam  01/20/2005     Esophagogastroduodenoscopy, colonoscopy with biopsies.  [facility]     Colonoscopy  7/1/2009     [facility] [place]     Hc ugi endoscopy, simple exam  7/1/2009     [facility] [place]     C removal gallbladder  8/5/2009     [facility] [place]     Hc colonoscopy thru stoma, diagnostic  Oct 2009     Dr López- normal     Hc ugi endoscopy, simple exam  11/11/2009     attempted upper GI, pt. could not tolerate procedure:[facility]      ugi endoscopy diag w biopsy  11/11/09     Normal esophagus     Orthopedic surgery  October 19,2011     diskectomy L4-L5         Prior to Admission Medications   Prior to Admission Medications   Prescriptions Last Dose Informant Patient Reported? Taking?   ARIPiprazole (ABILIFY) 2 MG tablet 2/22/2017 at Unknown time  No Yes   Sig: Take 1 tablet (2 mg) by mouth At Bedtime   Cholecalciferol (VITAMIN D-3) 5000 UNITS TABS 2/19/2017  Yes Yes   Sig: Take by mouth daily Reported on 2/13/2017   DULoxetine (CYMBALTA) 60 MG EC capsule 2/22/2017 at Unknown time  No Yes   Sig: Take 1 capsule (60 mg) by mouth daily   EPINEPHrine (EPIPEN) 0.3 MG/0.3ML injection More than a month at Unknown time  No No   Sig: Inject 0.3 mLs (0.3 mg) into the muscle once as needed for anaphylaxis   HYDROcodone-acetaminophen (NORCO) 5-325 MG per tablet 2/19/2017  No No   Sig: Take 1-2 tablets by mouth nightly as needed for moderate to severe pain   Misc Natural Products (FLEX-A-MIN JOINT FLEX PO) 2/22/2017 at Unknown time  Yes Yes   Sig: Take by mouth daily   Multiple Vitamin (MULTI-VITAMIN) per tablet 2/22/2017 at Unknown time  Yes Yes   Sig: Take 1 tablet by mouth daily.      ORDER FOR DME   No Yes   Sig: Equipment being ordered: Nebulizer   SUMAtriptan (IMITREX) 50 MG tablet 2/21/2017  No Yes   Sig: Take 1 tablet (50 mg) by mouth at onset of headache for migraine May repeat in 2 hours. Max 8 tablets/24 hours.   albuterol (PROAIR HFA, PROVENTIL HFA, VENTOLIN HFA) 108 (90 BASE) MCG/ACT inhaler 2/22/2017 at Unknown time  No Yes   Sig: Inhale 2 puffs into the lungs every 6 hours as needed for shortness of breath / dyspnea or wheezing   cyclobenzaprine (FLEXERIL) 10 MG tablet 2/22/2017 at Unknown time  No Yes   Sig: Take 1 tablet (10 mg) by mouth 2 times daily as needed for muscle spasms or other (back pain)   dexlansoprazole (DEXILANT) 60 MG CPDR CR capsule 2/23/2017 at Unknown time  No Yes   Sig: Take 1 capsule (60 mg) by mouth daily   dicyclomine (BENTYL) 20 MG tablet More than a month at Unknown time  No No   Sig: Take 1 tablet (20 mg) by mouth 4 times daily as needed (ABDOMINAL CRAMPING) AS NEEDED FOR CRAMPING   fexofenadine (ALLEGRA) 180 MG tablet Unknown at Unknown time  Yes No   Sig: Take 180 mg by mouth daily    fluticasone (FLONASE) 50 MCG/ACT nasal spray More than a month at Unknown time  No No   Sig: Spray 2 sprays into both nostrils daily   fluticasone-salmeterol (ADVAIR) 100-50 MCG/DOSE diskus inhaler 2/22/2017 at Unknown time  No Yes   Sig: Inhale 1 puff into the lungs 2 times daily   gabapentin (NEURONTIN) 300 MG capsule 2/23/2017 at Unknown time  No Yes   Sig: Take 1 capsule (300 mg) by mouth 3 times daily   medical cannabis inhalation (Patient's own supply.  Not a prescription) 2/22/2017 at Unknown time  Yes Yes   Sig: Inhale into the lungs 3 times daily as needed Reported on 2/13/2017   mupirocin (BACTROBAN) 2 % nasal ointment 2/22/2017 at Unknown time  Yes Yes   Sig: Apply 1 g into each nare 2 times daily   naproxen (NAPROSYN) 500 MG tablet 2/15/2017  No Yes   Sig: Take 1 tablet (500 mg) by mouth 2 times daily as needed for moderate pain   ondansetron (ZOFRAN ODT) 4 MG  ODT tab 2/21/2017 at Unknown time  No Yes   Sig: Take 1-2 tablets (4-8 mg) by mouth every 6 hours as needed for nausea Take 1-2 tablets by mouth as needed for nausea   ondansetron (ZOFRAN) 4 MG tablet 2/21/2017 at Unknown time  No Yes   Sig: Take 1 tablet (4 mg) by mouth every 8 hours as needed for nausea   promethazine (PHENERGAN) 25 MG tablet More than a month at Unknown time  Yes No   Sig: Take by mouth every 6 hours as needed for nausea   sucralfate (CARAFATE) 1 GM tablet More than a month at Unknown time  Yes No   Sig: Take by mouth 4 times daily Reported on 2/13/2017   vitamin D (ERGOCALCIFEROL) 42724 UNIT capsule 2/19/2017  No Yes   Sig: Take 1 capsule (50,000 Units) by mouth every 7 days for 8 doses      Facility-Administered Medications: None     Allergies   Allergies   Allergen Reactions     Baclofen      hives     Bees Hives, Swelling and Difficulty breathing     Caffeine      Contrast Dye      Hives,   Updated 5/10/2016 CT Contrast.     Diazepam      Other reaction(s): Other - Describe In Comment Field  Becomes aggressive and angry     Iodine Hives     Methocarbamol Swelling     Metoclopramide      Other reaction(s): Tremors  LW Reaction: shaking/sweating     Midazolam      Other reaction(s): Agitation     Monosodium Glutamate      Morphine Other (See Comments)     Difficulty with urination     Other (Do Not Use) Other (See Comments)     Xanaflex- pt becomes disoriented and loses bladder control     Percocet [Oxycodone-Acetaminophen] Itching     Reglan [Metoclopramide Hcl] Other (See Comments)     shaking     Soma [Carisoprodol] Visual Disturbance     Sleep walking     Tizanidine      Topamax Other (See Comments)     Topamax [Topiramate] Nausea     Tingling  GI/Vomit     Tramadol      Severe Headache, Seizure Risk     Tylenol W/Codeine [Acetaminophen-Codeine] Nausea and Itching     Tylenol 3     Valium Other (See Comments)     Becomes aggressive and angry     Versed Other (See Comments)     Zolmitriptan       Makes face feel like its twitching     Droperidol Anxiety     Flu Virus Vaccine Rash      Arm swelling       Social History   I have reviewed this patient's social history and updated it with pertinent information if needed. Katy Islas  reports that she has quit smoking. Her smoking use included Cigarettes. She has never used smokeless tobacco. She reports that she drinks about 0.6 oz of alcohol per week  She reports that she uses illicit drugs.    Family History   I have reviewed this patient's family history and updated it with pertinent information if needed.   Family History   Problem Relation Age of Onset     GASTROINTESTINAL DISEASE Brother      severe Crohn's     Neurologic Disorder Brother      Seizures post head injury     Depression Brother      Substance Abuse Brother      Genitourinary Problems Father      kidney stones     DIABETES Father      HEART DISEASE Father      Open heart surgery     Breast Cancer Maternal Grandmother      CEREBROVASCULAR DISEASE Paternal Grandmother      CANCER Maternal Grandfather      Lung     Cardiovascular Paternal Grandfather      Heart Attack     Substance Abuse Mother      Family history of addiction  YES - mother alcoholism    Review of Systems   The 10 point Review of Systems is negative other than noted in the HPI or here.     Physical Exam   Temp:  [97.2  F (36.2  C)-98.6  F (37  C)] 98.1  F (36.7  C)  Heart Rate:  [] 76  Resp:  [8-20] 20  BP: (118-141)/(72-95) 133/72  FiO2 (%):  [100 %] 100 %  SpO2:  [91 %-100 %] 97 %  147 lbs 0 oz  GEN:  Alert, oriented x 3, complains of severe neck spasms.  HEENT:  Normocephalic/atraumatic, no scleral icterus, no nasal discharge, mouth moist.  CV:  RRR, S1, S2; no murmurs or other irregularities noted.  +3 DP/PT pulses bilaterally; no edema BLE.  RESP:  Clear to auscultation bilaterally without rales/rhonchi/wheezing/retractions.  Symmetric chest rise on inhalation noted.  Normal respiratory effort.  ABD:  Rounded,  soft, non-tender/non-distended.  +BS  EXT:  CMS intact x 4.     M/S:   Low back is tender to palpation.    SKIN:  Dry to touch, no exanthems noted in the visualized areas.    NEURO: Left leg/foot is weaker then the right.  Sensation to touch intact all extremities.   There is no area of allodynia or hyperesthesia.  PAIN BEHAVIOR: Anxious and requesting at least a milligram of IV Dilaudid for pain  Psych:  Anxious affect, needing support to answer questions, signing appropriately to .        Data   Results for orders placed or performed during the hospital encounter of 02/23/17   XR Surgery HALEY L/T 5 Min Fluoro w Stills    Narrative    This exam was marked as non-reportable because it will not be read by a   radiologist or a [facility] non-radiologist provider.             HCG qualitative   Result Value Ref Range    HCG Qualitative Serum Negative NEG   ABO/Rh type and screen   Result Value Ref Range    ABO A     RH(D)  Neg     Antibody Screen Neg     Test Valid Only At [facility]     Specimen Expires 02/26/2017      *Note: Due to a large number of results and/or encounters for the requested time period, some results have not been displayed. A complete set of results can be found in Results Review.

## 2017-02-23 NOTE — IP AVS SNAPSHOT
MRN:[id number]                      After Visit Summary   2/23/2017    Katy Islas    MRN: [id number]           Thank you!     Thank you for choosing [facility] for your care. Our goal is always to provide you with excellent care. Hearing back from our patients is one way we can continue to improve our services. Please take a few minutes to complete the written survey that you may receive in the mail after you visit. If you would like to speak to someone directly about your visit please contact Patient Relations at [phone]. Thank you!          Patient Information     Date Of Birth          1990        About your hospital stay     You were admitted on:  February 23, 2017 You last received care in the:  [facility]    You were discharged on:  February 24, 2017        Reason for your hospital stay       Back surgery                  Who to Call     For medical emergencies, please call 911.  For non-urgent questions about your medical care, please call your primary care provider or clinic, [phone]  For questions related to your surgery, please call your surgery clinic        Attending Provider     Provider Specialty    Jeniffer Eugene MD Neurosurgery       Primary Care Provider Office Phone # Fax #    Aura Rosario PA-C [phone]       [facility] [address] [facility] [address]  [facility] [address]        After Care Instructions     Activity       Your activity upon discharge: Your activity upon discharge: Ad geovany within following limitations:  No excessive activities   No Bending, Twisting, climbing, Crawling,   No lifting more than 8 lb for 2 weeks, or 15 lb for 2 months or 25 lb for 4 months or 35 lb for 6 months  Brace for riding cars for 4-6 months            Diet       Follow this diet upon discharge: regular                  Follow-up Appointments     Follow-up and recommended labs and tests        Follow up  in 2 weeks with me or PCP for wound check ( patient's choice) if patients want to go to PCP for wound check, then f/u in my office  In 3 months                  Your next 10 appointments already scheduled     Feb 25, 2017  8:00 AM [facility] Inpatient with Assignments Open    Services Department ([facility])    [address] [place] [phone]               Feb 25, 2017  1:00 PM [facility] Inpatient with Assignments Open    Services Department ([facility])    [address] [place] [phone]               Feb 26, 2017  8:00 AM [facility] Inpatient with Assignments Open    Services Department ([facility])    [address] [place] [phone]               Feb 26, 2017  1:00 PM [facility] Inpatient with Assignments Open    Services Department ([facility])    [address] [place] [phone]               Feb 27, 2017  8:00 AM [facility] Inpatient with Assignments Open    Services Department ([facility])    [address] [place] [phone]               Mar 21, 2017 11:00 AM CDT   [facility]   [facility]    [address] [place] [phone]              Additional Services     Physical Therapy Referral       *This therapy referral will be filtered to a centralized scheduling office at [facility] and the patient will receive a call to schedule an appointment at a [place] location most convenient for them. *     [facility] provides Physical Therapy evaluation and  treatment and many specialty services across the [facility].  If requesting a specialty program, please choose from the list below.    If you have not heard from the scheduling office within 2 business days, please call [phone] for all locations, with the exception of [place], please call [phone].  Treatment: Evaluation & Treatment  Special Instructions/Modalities: evaluate and treat   Special Programs: none     Please be aware that coverage of these services is subject to the terms and limitations of your health insurance plan.  Call member services at your health plan with any benefit or coverage questions.      **Note to Provider:  If you are referring outside of [place] for the therapy appointment, please list the name of the location in the  special instructions  above, print the referral and give to the patient to schedule the appointment.                  Further instructions from your care team       Opioid Medication Information    You have been given a prescription for an opioid (narcotic) pain medicine and/or have received a pain medicine while here in the hospital. These medicines can make you drowsy or impaired. You must not drive, operate dangerous equipment, or engage in any other dangerous activities while taking these medications. If you drive while taking these medications, you could be arrested for DUI, or driving under the influence. Do not drink any alcohol while you are taking these medications.   Opioid pain medications can cause addiction. If you have a history of chemical dependency of any type, you are at a higher risk of becoming addicted to pain medications.  Only take these prescribed medications to treat your pain when all other options have been tried. Take it for as short a time and as few doses as possible. Store your pain pills in a secure place, as they are frequently stolen and provide a dangerous opportunity for children or visitors in your house to start abusing  "these powerful medications. We will not replace any lost or stolen medicine.  As soon as your pain is better, you should flush all your remaining medication.   Many prescription pain medications contain Tylenol  (acetaminophen), including Vicodin , Tylenol #3 , Norco , Lortab , and Percocet .  You should not take any extra pills of Tylenol  if you are using these prescription medications or you can get very sick.  Do not ever take more than 4000 mg of acetaminophen in any 24 hour period.  All opioids tend to cause constipation. Drink plenty of water and eat foods that have a lot of fiber, such as fruits, vegetables, prune juice, apple juice and high fiber cereal.  Take a laxative if you don t move your bowels at least every other day. Miralax , Milk of Magnesia, Colace , or Senna  can be used to keep you regular.          Pending Results     No orders found for last 3 day(s).            Statement of Approval     Ordered          02/24/17 0703  I have reviewed and agree with all the recommendations and orders detailed in this document.  EFFECTIVE NOW     Approved and electronically signed by:  Jeniffer Eugene MD             Admission Information     Date & Time Provider Department Dept. Phone    2/23/2017 Jeniffer Eugene MD [facility] [phone]      Your Vitals Were     Blood Pressure Pulse Temperature Respirations Height Weight    137/86 (BP Location: Right arm) 83 97  F (36.1  C) (Oral) 16 1.549 m (5' 1\") 66.7 kg (147 lb)    Last Period Pulse Oximetry BMI (Body Mass Index)             02/02/2012 99% 27.78 kg/m2         [employer] Information     [employer] gives you secure access to your electronic health record. If you see a primary care provider, you can also send messages to your care team and make appointments. If you have questions, please call your primary care clinic.  If you do not have a primary care provider, please call [phone] and they will assist you.        Care EveryWhere ID     This " is your Care EveryWhere ID. This could be used by other organizations to access your [place] medical records  [id number]           Review of your medicines      START taking        Dose / Directions    diazepam 5 MG tablet   Commonly known as:  VALIUM   Used for:  Chronic bilateral low back pain with left-sided sciatica        Dose:  5 mg   Take 1 tablet (5 mg) by mouth every 6 hours as needed for anxiety   Quantity:  5 tablet   Refills:  0       HYDROmorphone 2 MG tablet   Commonly known as:  DILAUDID        Dose:  2 mg   Take 1 tablet (2 mg) by mouth every 3 hours as needed for moderate to severe pain   Quantity:  60 tablet   Refills:  0       Lidocaine-Menthol 4-1 % Ptch   Used for:  Chronic bilateral low back pain with left-sided sciatica        Dose:  2 patch   Externally apply 2 patches topically At Bedtime   Quantity:  30 patch   Refills:  0         CONTINUE these medicines which may have CHANGED, or have new prescriptions. If we are uncertain of the size of tablets/capsules you have at home, strength may be listed as something that might have changed.        Dose / Directions    * order for DME   This may have changed:  Another medication with the same name was added. Make sure you understand how and when to take each.   Used for:  Mild intermittent asthma        Equipment being ordered: Nebulizer   Quantity:  1 Units   Refills:  0       * order for DME   This may have changed:  You were already taking a medication with the same name, and this prescription was added. Make sure you understand how and when to take each.        Equipment being ordered: Walker () Treatment Diagnosis: S/P SPINE SURGERY   Quantity:  1 each   Refills:  0       * order for DME   This may have changed:  You were already taking a medication with the same name, and this prescription was added. Make sure you understand how and when to take each.        Equipment being ordered: Walker Wheels () and Walker () Treatment  Diagnosis: difficulty walking   Quantity:  1 each   Refills:  0       * Notice:  This list has 3 medication(s) that are the same as other medications prescribed for you. Read the directions carefully, and ask your doctor or other care provider to review them with you.      CONTINUE these medicines which have NOT CHANGED        Dose / Directions    albuterol 108 (90 BASE) MCG/ACT Inhaler   Commonly known as:  PROAIR HFA/PROVENTIL HFA/VENTOLIN HFA   Used for:  Mild intermittent asthma without complication        Dose:  2 puff   Inhale 2 puffs into the lungs every 6 hours as needed for shortness of breath / dyspnea or wheezing   Quantity:  3 Inhaler   Refills:  1       ALLEGRA 180 MG tablet   Generic drug:  fexofenadine   Notes to Patient:  May resume med as needed        Dose:  180 mg   Take 180 mg by mouth daily   Refills:  0       ARIPiprazole 2 MG tablet   Commonly known as:  ABILIFY   Used for:  Adjustment disorder with mixed anxiety and depressed mood, Anxiety        Dose:  2 mg   Take 1 tablet (2 mg) by mouth At Bedtime   Quantity:  90 tablet   Refills:  3       cyclobenzaprine 10 MG tablet   Commonly known as:  FLEXERIL   Used for:  Chronic bilateral low back pain with left-sided sciatica   Notes to Patient:  If to sleepy hold this med.         Dose:  10 mg   Take 1 tablet (10 mg) by mouth 2 times daily as needed for muscle spasms or other (back pain)   Quantity:  60 tablet   Refills:  3       dexlansoprazole 60 MG Cpdr CR capsule   Commonly known as:  DEXILANT   Used for:  Gastroesophageal reflux disease without esophagitis   Notes to Patient:  May resume med as taken prior to admission         Dose:  60 mg   Take 1 capsule (60 mg) by mouth daily   Quantity:  90 capsule   Refills:  3       dicyclomine 20 MG tablet   Commonly known as:  BENTYL   Used for:  Abdominal cramping, Hx of Crohn's disease   Notes to Patient:  Resume med as taken prior to admission         Dose:  20 mg   Take 1 tablet (20 mg) by mouth 4  times daily as needed (ABDOMINAL CRAMPING) AS NEEDED FOR CRAMPING   Quantity:  30 tablet   Refills:  0       DULoxetine 60 MG EC capsule   Commonly known as:  CYMBALTA   Used for:  Adjustment disorder with mixed anxiety and depressed mood        Dose:  60 mg   Take 1 capsule (60 mg) by mouth daily   Quantity:  90 capsule   Refills:  3       EPINEPHrine 0.3 MG/0.3ML injection   Used for:  Bee sting allergy        Dose:  0.3 mg   Inject 0.3 mLs (0.3 mg) into the muscle once as needed for anaphylaxis   Quantity:  0.6 mL   Refills:  1       FLEX-A-MIN JOINT FLEX PO   Notes to Patient:  Resume med as taken prior to admission         Take by mouth daily   Refills:  0       fluticasone 50 MCG/ACT spray   Commonly known as:  FLONASE   Used for:  Abdominal pain        Dose:  2 spray   Spray 2 sprays into both nostrils daily   Quantity:  1 Package   Refills:  5       fluticasone-salmeterol 100-50 MCG/DOSE diskus inhaler   Commonly known as:  ADVAIR   Used for:  Mild persistent asthma without complication   Notes to Patient:  Resume med as taken prior to admission         Dose:  1 puff   Inhale 1 puff into the lungs 2 times daily   Quantity:  1 Inhaler   Refills:  5       gabapentin 300 MG capsule   Commonly known as:  NEURONTIN   Used for:  Chronic bilateral low back pain with left-sided sciatica        Dose:  300 mg   Take 1 capsule (300 mg) by mouth 3 times daily   Quantity:  270 capsule   Refills:  1       HYDROcodone-acetaminophen 5-325 MG per tablet   Commonly known as:  NORCO   Used for:  Left-sided low back pain without sciatica, unspecified chronicity   Notes to Patient:  Do not take while on PO Dilaudid         Dose:  1-2 tablet   Take 1-2 tablets by mouth nightly as needed for moderate to severe pain   Quantity:  30 tablet   Refills:  0       medical cannabis inhalation (Patient's own supply.  Not a prescription)   Notes to Patient:  May take as needed        Inhale into the lungs 3 times daily as needed Reported on  2/13/2017   Refills:  0       Multi-vitamin Tabs tablet   Generic drug:  multivitamin, therapeutic with minerals        Dose:  1 tablet   Take 1 tablet by mouth daily.   Refills:  0       mupirocin 2 % nasal ointment   Commonly known as:  BACTROBAN   Indication:  Apply small amount to each nostril twice daily for 7 days prior to surgery.        Dose:  1 g   Start taking on:  2/27/2017   Apply 1 g into each nare 2 times daily   Refills:  0       naproxen 500 MG tablet   Commonly known as:  NAPROSYN   Used for:  Chronic bilateral low back pain with left-sided sciatica        Dose:  500 mg   Take 1 tablet (500 mg) by mouth 2 times daily as needed for moderate pain   Quantity:  180 tablet   Refills:  1       ondansetron 4 MG ODT tab   Commonly known as:  ZOFRAN ODT   Used for:  Nausea        Dose:  4-8 mg   Take 1-2 tablets (4-8 mg) by mouth every 6 hours as needed for nausea Take 1-2 tablets by mouth as needed for nausea   Quantity:  30 tablet   Refills:  1       ondansetron 4 MG tablet   Commonly known as:  ZOFRAN   Used for:  Chronic migraine without aura without status migrainosus, not intractable        Dose:  4 mg   Take 1 tablet (4 mg) by mouth every 8 hours as needed for nausea   Quantity:  30 tablet   Refills:  11       promethazine 25 MG tablet   Commonly known as:  PHENERGAN        Take by mouth every 6 hours as needed for nausea   Refills:  0       sucralfate 1 GM tablet   Commonly known as:  CARAFATE   Notes to Patient:  Resume as needed        Take by mouth 4 times daily Reported on 2/13/2017   Refills:  0       SUMAtriptan 50 MG tablet   Commonly known as:  IMITREX   Used for:  Chronic migraine without aura without status migrainosus, not intractable        Dose:  50 mg   Take 1 tablet (50 mg) by mouth at onset of headache for migraine May repeat in 2 hours. Max 8 tablets/24 hours.   Quantity:  18 tablet   Refills:  3       vitamin D 35617 UNIT capsule   Commonly known as:  ERGOCALCIFEROL   Used for:   Vitamin D deficiency   Notes to Patient:  Resume med as taken at home        Dose:  89280 Units   Take 1 capsule (50,000 Units) by mouth every 7 days for 8 doses   Quantity:  8 capsule   Refills:  0       Vitamin D-3 5000 UNITS Tabs   Notes to Patient:  Resume med as taken at home        Take by mouth daily Reported on 2/13/2017   Refills:  0            Where to get your medicines      These medications were sent to [place], [place] - [address]  [address] [facility] [address]     Phone:  [phone]     cyclobenzaprine 10 MG tablet    Lidocaine-Menthol 4-1 % Ptch         Some of these will need a paper prescription and others can be bought over the counter. Ask your nurse if you have questions.     Bring a paper prescription for each of these medications     diazepam 5 MG tablet    HYDROmorphone 2 MG tablet    order for DME    order for DME                Protect others around you: Learn how to safely use, store and throw away your medicines at www.disposemymeds.org.             Medication List: This is a list of all your medications and when to take them. Check marks below indicate your daily home schedule. Keep this list as a reference.      Medications           Morning Afternoon Evening Bedtime As Needed    albuterol 108 (90 BASE) MCG/ACT Inhaler   Commonly known as:  PROAIR HFA/PROVENTIL HFA/VENTOLIN HFA   Inhale 2 puffs into the lungs every 6 hours as needed for shortness of breath / dyspnea or wheezing                                   ALLEGRA 180 MG tablet   Take 180 mg by mouth daily   Generic drug:  fexofenadine   Notes to Patient:  May resume med as needed                                   ARIPiprazole 2 MG tablet   Commonly known as:  ABILIFY   Take 1 tablet (2 mg) by mouth At Bedtime   Last time this was given:  2 mg on 2/23/2017  8:11 PM   Next Dose Due:  2-                                   cyclobenzaprine 10 MG tablet   Commonly known as:   FLEXERIL   Take 1 tablet (10 mg) by mouth 2 times daily as needed for muscle spasms or other (back pain)   Notes to Patient:  If to sleepy hold this med.                                          dexlansoprazole 60 MG Cpdr CR capsule   Commonly known as:  DEXILANT   Take 1 capsule (60 mg) by mouth daily   Notes to Patient:  May resume med as taken prior to admission                                    diazepam 5 MG tablet   Commonly known as:  VALIUM   Take 1 tablet (5 mg) by mouth every 6 hours as needed for anxiety                                   dicyclomine 20 MG tablet   Commonly known as:  BENTYL   Take 1 tablet (20 mg) by mouth 4 times daily as needed (ABDOMINAL CRAMPING) AS NEEDED FOR CRAMPING   Notes to Patient:  Resume med as taken prior to admission                                    DULoxetine 60 MG EC capsule   Commonly known as:  CYMBALTA   Take 1 capsule (60 mg) by mouth daily   Last time this was given:  60 mg on 2/24/2017  8:11 AM   Next Dose Due:  2-                                   EPINEPHrine 0.3 MG/0.3ML injection   Inject 0.3 mLs (0.3 mg) into the muscle once as needed for anaphylaxis                                   FLEX-A-MIN JOINT FLEX PO   Take by mouth daily   Notes to Patient:  Resume med as taken prior to admission                                    fluticasone 50 MCG/ACT spray   Commonly known as:  FLONASE   Spray 2 sprays into both nostrils daily                                   fluticasone-salmeterol 100-50 MCG/DOSE diskus inhaler   Commonly known as:  ADVAIR   Inhale 1 puff into the lungs 2 times daily   Notes to Patient:  Resume med as taken prior to admission                                       gabapentin 300 MG capsule   Commonly known as:  NEURONTIN   Take 1 capsule (300 mg) by mouth 3 times daily   Last time this was given:  300 mg on 2/24/2017  8:12 AM   Next Dose Due:  0-02-13tctgdri                                         HYDROcodone-acetaminophen 5-325 MG per  tablet   Commonly known as:  NORCO   Take 1-2 tablets by mouth nightly as needed for moderate to severe pain   Notes to Patient:  Do not take while on PO Dilaudid                                    HYDROmorphone 2 MG tablet   Commonly known as:  DILAUDID   Take 1 tablet (2 mg) by mouth every 3 hours as needed for moderate to severe pain   Last time this was given:  1 mg on 2/24/2017  8:10 AM                                   Lidocaine-Menthol 4-1 % Ptch   Externally apply 2 patches topically At Bedtime   Next Dose Due:  2-                                medical cannabis inhalation (Patient's own supply.  Not a prescription)   Inhale into the lungs 3 times daily as needed Reported on 2/13/2017   Notes to Patient:  May take as needed                                   Multi-vitamin Tabs tablet   Take 1 tablet by mouth daily.   Generic drug:  multivitamin, therapeutic with minerals   Next Dose Due:  2-                                   mupirocin 2 % nasal ointment   Commonly known as:  BACTROBAN   Apply 1 g into each nare 2 times daily   Start taking on:  2/27/2017                                naproxen 500 MG tablet   Commonly known as:  NAPROSYN   Take 1 tablet (500 mg) by mouth 2 times daily as needed for moderate pain                                   ondansetron 4 MG ODT tab   Commonly known as:  ZOFRAN ODT   Take 1-2 tablets (4-8 mg) by mouth every 6 hours as needed for nausea Take 1-2 tablets by mouth as needed for nausea                                   ondansetron 4 MG tablet   Commonly known as:  ZOFRAN   Take 1 tablet (4 mg) by mouth every 8 hours as needed for nausea                                   * order for DME   Equipment being ordered: Nebulizer                                * order for DME   Equipment being ordered: Walker () Treatment Diagnosis: S/P SPINE SURGERY                                * order for DME   Equipment being ordered: Walker Wheels () and Walker ()  Treatment Diagnosis: difficulty walking                                promethazine 25 MG tablet   Commonly known as:  PHENERGAN   Take by mouth every 6 hours as needed for nausea                                   sucralfate 1 GM tablet   Commonly known as:  CARAFATE   Take by mouth 4 times daily Reported on 2/13/2017   Notes to Patient:  Resume as needed                                            SUMAtriptan 50 MG tablet   Commonly known as:  IMITREX   Take 1 tablet (50 mg) by mouth at onset of headache for migraine May repeat in 2 hours. Max 8 tablets/24 hours.                                   vitamin D 51948 UNIT capsule   Commonly known as:  ERGOCALCIFEROL   Take 1 capsule (50,000 Units) by mouth every 7 days for 8 doses   Notes to Patient:  Resume med as taken at home                                Vitamin D-3 5000 UNITS Tabs   Take by mouth daily Reported on 2/13/2017   Notes to Patient:  Resume med as taken at home                                   * Notice:  This list has 3 medication(s) that are the same as other medications prescribed for you. Read the directions carefully, and ask your doctor or other care provider to review them with you.

## 2017-02-23 NOTE — PHARMACY-ADMISSION MEDICATION HISTORY
Admission medication history interview status for this patient was completed by Vivi Cramer RN rustam Feb 23, 2017  7:15 AM       Prior to Admission medications    Medication Sig Last Dose Taking? Auth Provider   mupirocin (BACTROBAN) 2 % nasal ointment Apply 1 g into each nare 2 times daily 2/22/2017 at Unknown time Yes Reported, Patient   dexlansoprazole (DEXILANT) 60 MG CPDR CR capsule Take 1 capsule (60 mg) by mouth daily 2/23/2017 at Unknown time Yes Aura Rosario PA-C   medical cannabis inhalation (Patient's own supply.  Not a prescription) Inhale into the lungs 3 times daily as needed Reported on 2/13/2017 2/22/2017 at Unknown time Yes Reported, Patient   ondansetron (ZOFRAN ODT) 4 MG ODT tab Take 1-2 tablets (4-8 mg) by mouth every 6 hours as needed for nausea Take 1-2 tablets by mouth as needed for nausea 2/21/2017 at Unknown time Yes Aura Rosario PA-C   ARIPiprazole (ABILIFY) 2 MG tablet Take 1 tablet (2 mg) by mouth At Bedtime 2/22/2017 at Unknown time Yes Aura Rosario PA-C   SUMAtriptan (IMITREX) 50 MG tablet Take 1 tablet (50 mg) by mouth at onset of headache for migraine May repeat in 2 hours. Max 8 tablets/24 hours. 2/21/2017 Yes Aura Rosario PA-C   ondansetron (ZOFRAN) 4 MG tablet Take 1 tablet (4 mg) by mouth every 8 hours as needed for nausea 2/21/2017 at Unknown time Yes Aura Rosario PA-C   vitamin D (ERGOCALCIFEROL) 14691 UNIT capsule Take 1 capsule (50,000 Units) by mouth every 7 days for 8 doses 2/19/2017 Yes Aura Rosario PA-C   cyclobenzaprine (FLEXERIL) 10 MG tablet Take 1 tablet (10 mg) by mouth 2 times daily as needed for muscle spasms or other (back pain) 2/22/2017 at Unknown time Yes Aura Rosario PA-C   DULoxetine (CYMBALTA) 60 MG EC capsule Take 1 capsule (60 mg) by mouth daily 2/22/2017 at Unknown time Yes Aura Rosario PA-C    naproxen (NAPROSYN) 500 MG tablet Take 1 tablet (500 mg) by mouth 2 times daily as needed for moderate pain 2/15/2017 Yes Aura Rosario PA-C   gabapentin (NEURONTIN) 300 MG capsule Take 1 capsule (300 mg) by mouth 3 times daily 2/23/2017 at Unknown time Yes Aura Rosario PA-C   fluticasone-salmeterol (ADVAIR) 100-50 MCG/DOSE diskus inhaler Inhale 1 puff into the lungs 2 times daily 2/22/2017 at Unknown time Yes Bob Monson PA-C   albuterol (PROAIR HFA, PROVENTIL HFA, VENTOLIN HFA) 108 (90 BASE) MCG/ACT inhaler Inhale 2 puffs into the lungs every 6 hours as needed for shortness of breath / dyspnea or wheezing 2/22/2017 at Unknown time Yes Liza Spaulding PA-C   ORDER FOR DME Equipment being ordered: Nebulizer  Yes Giorgio Serna MD   Cholecalciferol (VITAMIN D-3) 5000 UNITS TABS Take by mouth daily Reported on 2/13/2017 2/19/2017 Yes Reported, Patient   Misc Natural Products (FLEX-A-MIN JOINT FLEX PO) Take by mouth daily 2/22/2017 at Unknown time Yes Reported, Patient   Multiple Vitamin (MULTI-VITAMIN) per tablet Take 1 tablet by mouth daily.   2/22/2017 at Unknown time Yes Reported, Patient   HYDROcodone-acetaminophen (NORCO) 5-325 MG per tablet Take 1-2 tablets by mouth nightly as needed for moderate to severe pain 2/19/2017  Aura Rosario PA-C   sucralfate (CARAFATE) 1 GM tablet Take by mouth 4 times daily Reported on 2/13/2017 More than a month at Unknown time  Reported, Patient   promethazine (PHENERGAN) 25 MG tablet Take by mouth every 6 hours as needed for nausea More than a month at Unknown time  Reported, Patient   EPINEPHrine (EPIPEN) 0.3 MG/0.3ML injection Inject 0.3 mLs (0.3 mg) into the muscle once as needed for anaphylaxis More than a month at Unknown time  Aura Rosario PA-C   dicyclomine (BENTYL) 20 MG tablet Take 1 tablet (20 mg) by mouth 4 times daily as needed (ABDOMINAL CRAMPING) AS NEEDED FOR CRAMPING More  than a month at Unknown time  Bob Monson PA-C   fluticasone (FLONASE) 50 MCG/ACT nasal spray Spray 2 sprays into both nostrils daily More than a month at Unknown time  Giorgio Serna MD   fexofenadine (ALLEGRA) 180 MG tablet Take 180 mg by mouth daily  Unknown at Unknown time  Reported, Patient

## 2017-02-23 NOTE — OP NOTE
REPORT OF OPERATION  Katy Islas is a 26 year old old female admitted on 2/23/2017  6:26 AM.  ?  Operative Date:  2/23/2017    PRE-PROCEDURE DIAGNOSIS:  1) L4/5 degenerative disc disease.      POST-PROCEDURE DIAGNOSIS:  1) Same as above    PROCEDURE PERFORMED:  1) L4/5 oblique lateral lumbar interbody fusion with discectomy, preparation of the endplate and placement of a bullet cage packed with calcium triphosphate soaked in bone marrow  anterior to the transverse process in modified prone position, with intraoperative biplanar fluoroscopic imaging and electrophysiological monitoring.    2) L4/5 Posterior minimally invasive pedicle screw placement and posterolateral instrumentation and fusion with intraoperative biplanar fluoroscopic imaging and electrophysiological monitoring.    3) Epidural steroid injection.    4) Transpedicular Bone marrow aspiration    Surgeon: Jeniffer Eugene MD    ASSISTANT:  Zach Chapa CNP     HISTORY: Please refer to my clinic note for full details, but in short, patient is a 26 -year-old female with severe back pain and radiculopathy not responding to usual conservative therapy. Patient was set up for the surgery as mentioned above and was taken to surgery as mentioned above after all risks and benefits were explained.    PROCEDURE:   The patient was taken to surgery. After general anesthesia was applied, SCDs and Carrion  placed and preoperative antibiotic given, then patient has been positioned on the Paul table and Ervin frame in a modified prone position for ease of access from the left side.    AP and lateral fluoroscopic images are positioned. Patient has been prepped and draped in sterile fashion. The landmarks, including Spinal process, transverse process, disk space, endplates and pedicels are identified and marked.      A Jamshidi needle is place in upper right pedicle inside of the vertebral body and bone marrow has been aspirated to be mixed with biologics to intruduce   Stem cells to the biologics.    Following steps are then taken for levels:  L4/5 Cage size 11 mm high and 27 mm long Titanium    The patient was turned using the rotation of the surgical table so that a near direct anterior-lateral approach to the lumbar spine could be achieved. A 10mm stab incision was then made superior to the mid iliac crest and then using biplanar fluoroscopic visualization, under electrophysiological monitoring and stimulation, we introduced an electrophysiological  probe through the retro peritoneal space into the desired discs anterior to the transverse processes and then passed it into the disc space after finding a silent window. The sleeve is retained and the probe is removed, then a K wire was passed sequentially into the disk space.  A dilating tube was then passed along this same route. Following this, a 10 mm working channel was then passed sequentially into the disc spaces. The working channel was manually held in position while a series of disc cleaning tools was passed through the channel to remove the affected discs, decompress the nerve roots, and decorticate the vertebral endplates at those segments.       Arthrodesis of the intervertebral spaces via an anterior retroperitoneal exposure and application of an intervertebral biomechanical device was then accomplished by using the working channel that had been placed in the retroperitoneal space anterior to the transverse processes. After adequate decompression and preparation of the endplates, we then put calcium triphosphate soaked in bone marrow  anterior into disc space and then a PEEK interbody was packed tightly with allograft bone for stabilization and arthrodesis of the intervertebral spaces and inserted into the mid portion of the intervertebral discs. This was done under biplanar fluoroscopic guidance. All bone was confined to the borders of the disc space. The working channel was then removed.      Following steps are  then taken for levels:  L4 6.5 Screw size Right 40 Left 40  L5 6.5 Screw size Right 40 Left 40    Then patient is rotated  for a true prone position. Then entry point for the pedicles is identified in the AP and lateral view, and then skin incision has been injected with local anesthetic. Then we entered the pedicle with a Jamshidi needle. Over the Jamshidi needle, we introduced the K wire in Vertebral body. Additionally we use a small periostal elevator along the screws to refresh the surface of the bone and facet and put minimal amount of Calcium-triphoisphate soaked in bone marrow  for additional posterolateral fusion. Over the K wire then , we dilate the muscle with the dilator and then put a pedicle screws bilaterally. Screws are all silent up to  24 MA of stimulation. After screws are all placed, we put marilyn in place and under fluoroscopic imaging, we locked the marilyn in place and removed the screw tops and then each incision has been closed with 2-0 Vicryl suture and then Steri-Strips applied.  Before the end of the surgery, we injected 40mg Kenalog and 1 cc 0.25% Marcaine for epidural steroid injection in epidural space under fluoroscopic imaging after we introduced the spinal needle and confirmed with injecting 2cc air and aspiration which confirms we are in the epidural space and no CSF is returned.    20 cc of marcaine 0.75 was injected into deep tissue at the end of surgery for post operative pain management.      Additional finding: none      Estimated blood:  66cc.      DISPOSITION: To PACU with postoperative antibiotic. All counts are correct at the end of the surgery.    Jeniffer Eugene MD  cc: Jeniffer Eugene MD

## 2017-02-23 NOTE — DISCHARGE SUMMARY
Discharge Summary    Attending Physician:  Jeniffer Eugene MD  Admit Date: 2/23/2017    Discharge Date: 2/24/17  Primary Care Physician: Aura Rosario    Discharge Diagnoses  L4/5 DDD  Discharge Exam    AAOx3 PEREZ f/c all for , no weakness, No new sensory deficit, incision C/D/I        Preliminary Discharge Medications    This list of medications is preliminary and tentative.  Please see the After Visit Summary for the final and accurate medication list.    @[email or web address]@    Procedures Performed and Findings  Procedure(s):  L4-5 OLIF  - Wound Class: I-Clean       Consultations Obtained  None    Code Status   Prior    Discharge Disposition        Diet on Discharge   Regular    Activity on Discharge   Your activity upon discharge: Ad geovany within following limitations:  No excessive activities   No Bending, Twisting, climbing, Crawling,   No lifting more than 8 lb for 2 weeks, or 15 lb for 2 months or 25 lb for 4 months or 35 lb for 6 months  Brace for riding cars for 4-6 months      Discharge Instructions  Per [facility] instruction : [email or web address]        Follow-Up Scheduled    Follow up in 2 weeks with me or PCP for wound check ( patient's choice) if patients want to go to PCP for wound check, then f/u in my office  In 3 months      Hospital Course   Hospital Course unremarkable , adequate ambulation in due time, pain controlled , cleared by PT/OT, no events

## 2017-02-23 NOTE — ANESTHESIA CARE TRANSFER NOTE
Patient: Katy Islas    Procedure(s):  L4-5 OLIF  - Wound Class: I-Clean    Diagnosis: DDD  Diagnosis Additional Information: No value filed.    Anesthesia Type:   General     Note:  Airway :Blow-by  Patient transferred to:PACU  Comments: Transferred to recovery, spontaneous respirations, adequate ventilation/oxygenation, report shared.      Vitals: (Last set prior to Anesthesia Care Transfer)    CRNA VITALS  2/23/2017 1318 - 2/23/2017 1348      2/23/2017             NIBP: (!)  136/128    Pulse: 92    NIBP Mean: 133    SpO2: 100 %                Electronically Signed By: NATA Pitts CRNA  February 23, 2017  1:48 PM

## 2017-02-23 NOTE — ANESTHESIA PREPROCEDURE EVALUATION
Anesthesia Evaluation     . Pt has had prior anesthetic. Type: General    No history of anesthetic complications     ROS/MED HX    ENT/Pulmonary:     (+)Intermittent asthma , . .    Neurologic:  - neg neurologic ROS     Cardiovascular:  - neg cardiovascular ROS       METS/Exercise Tolerance:     Hematologic:  - neg hematologic  ROS       Musculoskeletal:  - neg musculoskeletal ROS       GI/Hepatic:     (+) GERD Asymptomatic on medication,       Renal/Genitourinary:     (+) chronic renal disease, Nephrolithiasis ,       Endo:  - neg endo ROS       Psychiatric:  - neg psychiatric ROS       Infectious Disease:         Malignancy:         Other:    - neg other ROS           Physical Exam  Normal systems: cardiovascular, pulmonary and dental    Airway   Mallampati: II  TM distance: >3 FB  Neck ROM: full    Dental     Cardiovascular       Pulmonary                     Anesthesia Plan      History & Physical Review  History and physical reviewed and following examination; no interval change.    ASA Status:  2 .    NPO Status:  > 8 hours    Plan for General with Intravenous induction. Maintenance will be Balanced.    PONV prophylaxis:  Ondansetron (or other 5HT-3) and Dexamethasone or Solumedrol       Postoperative Care  Postoperative pain management:  IV analgesics and Oral pain medications.      Consents  Anesthetic plan, risks, benefits and alternatives discussed with:  Patient..                          .

## 2017-02-23 NOTE — IP AVS SNAPSHOT
[facility]    [address]    [facility] [address]    Phone:  [phone]    Fax:  [phone]                                       After Visit Summary   2/23/2017    Katy Islas    MRN: [id number]           After Visit Summary Signature Page     I have received my discharge instructions, and my questions have been answered. I have discussed any challenges I see with this plan with the nurse or doctor.    ..........................................................................................................................................  Patient/Patient Representative Signature      ..........................................................................................................................................  Patient Representative Print Name and Relationship to Patient    ..................................................               ................................................  Date                                            Time    ..........................................................................................................................................  Reviewed by Signature/Title    ...................................................              ..............................................  Date                                                            Time

## 2017-02-23 NOTE — PLAN OF CARE
Problem: Goal Outcome Summary  Goal: Goal Outcome Summary  PT: Pt still in PACU at scheduled PT time. Rescheduled for tomorrow.

## 2017-02-24 ENCOUNTER — APPOINTMENT (OUTPATIENT)
Dept: OCCUPATIONAL THERAPY | Facility: CLINIC | Age: 27
DRG: 455 | End: 2017-02-24
Attending: NEUROLOGICAL SURGERY
Payer: MEDICARE

## 2017-02-24 ENCOUNTER — APPOINTMENT (OUTPATIENT)
Dept: PHYSICAL THERAPY | Facility: CLINIC | Age: 27
DRG: 455 | End: 2017-02-24
Attending: NEUROLOGICAL SURGERY
Payer: MEDICARE

## 2017-02-24 ENCOUNTER — TELEPHONE (OUTPATIENT)
Dept: FAMILY MEDICINE | Facility: OTHER | Age: 27
End: 2017-02-24

## 2017-02-24 VITALS
DIASTOLIC BLOOD PRESSURE: 86 MMHG | BODY MASS INDEX: 27.75 KG/M2 | WEIGHT: 147 LBS | TEMPERATURE: 97 F | OXYGEN SATURATION: 99 % | HEART RATE: 83 BPM | HEIGHT: 61 IN | RESPIRATION RATE: 16 BRPM | SYSTOLIC BLOOD PRESSURE: 137 MMHG

## 2017-02-24 PROCEDURE — 97530 THERAPEUTIC ACTIVITIES: CPT | Mod: GP | Performed by: PHYSICAL THERAPIST

## 2017-02-24 PROCEDURE — A9270 NON-COVERED ITEM OR SERVICE: HCPCS | Mod: GY | Performed by: CLINICAL NURSE SPECIALIST

## 2017-02-24 PROCEDURE — 97116 GAIT TRAINING THERAPY: CPT | Mod: GP | Performed by: PHYSICAL THERAPIST

## 2017-02-24 PROCEDURE — 97165 OT EVAL LOW COMPLEX 30 MIN: CPT | Mod: GO | Performed by: REHABILITATION PRACTITIONER

## 2017-02-24 PROCEDURE — 25000128 H RX IP 250 OP 636: Performed by: NEUROLOGICAL SURGERY

## 2017-02-24 PROCEDURE — 25000132 ZZH RX MED GY IP 250 OP 250 PS 637: Mod: GY | Performed by: CLINICAL NURSE SPECIALIST

## 2017-02-24 PROCEDURE — 25000125 ZZHC RX 250: Performed by: NEUROLOGICAL SURGERY

## 2017-02-24 PROCEDURE — 97161 PT EVAL LOW COMPLEX 20 MIN: CPT | Mod: GP | Performed by: PHYSICAL THERAPIST

## 2017-02-24 PROCEDURE — T1013 SIGN LANG/ORAL INTERPRETER: HCPCS | Mod: U3

## 2017-02-24 PROCEDURE — 97535 SELF CARE MNGMENT TRAINING: CPT | Mod: GO | Performed by: REHABILITATION PRACTITIONER

## 2017-02-24 PROCEDURE — 25000132 ZZH RX MED GY IP 250 OP 250 PS 637: Mod: GY | Performed by: NEUROLOGICAL SURGERY

## 2017-02-24 PROCEDURE — 99231 SBSQ HOSP IP/OBS SF/LOW 25: CPT | Performed by: CLINICAL NURSE SPECIALIST

## 2017-02-24 PROCEDURE — 80048 BASIC METABOLIC PNL TOTAL CA: CPT | Performed by: NEUROLOGICAL SURGERY

## 2017-02-24 PROCEDURE — 40000133 ZZH STATISTIC OT WARD VISIT: Performed by: REHABILITATION PRACTITIONER

## 2017-02-24 PROCEDURE — A9270 NON-COVERED ITEM OR SERVICE: HCPCS | Mod: GY | Performed by: NEUROLOGICAL SURGERY

## 2017-02-24 PROCEDURE — 85025 COMPLETE CBC W/AUTO DIFF WBC: CPT | Performed by: NEUROLOGICAL SURGERY

## 2017-02-24 PROCEDURE — 40000193 ZZH STATISTIC PT WARD VISIT: Performed by: PHYSICAL THERAPIST

## 2017-02-24 RX ORDER — CYCLOBENZAPRINE HCL 10 MG
10 TABLET ORAL 2 TIMES DAILY PRN
Qty: 60 TABLET | Refills: 3 | Status: SHIPPED | OUTPATIENT
Start: 2017-02-24 | End: 2017-04-10

## 2017-02-24 RX ORDER — HYDROMORPHONE HYDROCHLORIDE 2 MG/1
2 TABLET ORAL
Qty: 60 TABLET | Refills: 0 | Status: SHIPPED | OUTPATIENT
Start: 2017-02-24 | End: 2017-02-27 | Stop reason: SINTOL

## 2017-02-24 RX ORDER — DIAZEPAM 5 MG
5 TABLET ORAL EVERY 6 HOURS PRN
Status: DISCONTINUED | OUTPATIENT
Start: 2017-02-24 | End: 2017-02-24 | Stop reason: HOSPADM

## 2017-02-24 RX ORDER — DIAZEPAM 5 MG
5 TABLET ORAL EVERY 6 HOURS PRN
Qty: 5 TABLET | Refills: 0 | Status: SHIPPED | OUTPATIENT
Start: 2017-02-24 | End: 2017-02-27

## 2017-02-24 RX ORDER — LIDOCAINE 50 MG/G
2 PATCH TOPICAL
Status: DISCONTINUED | OUTPATIENT
Start: 2017-02-24 | End: 2017-02-24 | Stop reason: HOSPADM

## 2017-02-24 RX ADMIN — KETOROLAC TROMETHAMINE 30 MG: 30 INJECTION, SOLUTION INTRAMUSCULAR at 04:53

## 2017-02-24 RX ADMIN — HYDROXYZINE HYDROCHLORIDE 50 MG: 50 TABLET, FILM COATED ORAL at 07:10

## 2017-02-24 RX ADMIN — CEFAZOLIN SODIUM 1 G: 1 INJECTION, SOLUTION INTRAVENOUS at 03:30

## 2017-02-24 RX ADMIN — HYDROMORPHONE HYDROCHLORIDE 1 MG: 2 TABLET ORAL at 08:10

## 2017-02-24 RX ADMIN — GABAPENTIN 300 MG: 300 CAPSULE ORAL at 08:12

## 2017-02-24 RX ADMIN — Medication: at 06:57

## 2017-02-24 RX ADMIN — ACETAMINOPHEN 975 MG: 325 TABLET, FILM COATED ORAL at 10:32

## 2017-02-24 RX ADMIN — SENNOSIDES AND DOCUSATE SODIUM 1 TABLET: 8.6; 5 TABLET ORAL at 08:12

## 2017-02-24 RX ADMIN — POTASSIUM CHLORIDE AND SODIUM CHLORIDE: 450; 150 INJECTION, SOLUTION INTRAVENOUS at 04:28

## 2017-02-24 RX ADMIN — DULOXETINE 60 MG: 60 CAPSULE, DELAYED RELEASE ORAL at 08:11

## 2017-02-24 RX ADMIN — PANTOPRAZOLE SODIUM 40 MG: 40 TABLET, DELAYED RELEASE ORAL at 08:11

## 2017-02-24 NOTE — TELEPHONE ENCOUNTER
Reason for call:  Patient was dismissed today after back surgery yesterday.  Kavita's concerned regarding the pain medication going home.    Its reasonable to send home with tree days worth of dilaudid but she wants to make sure you know she had surgery and she thinks she may be asking for more Monday.   Phone [phone] Kavita Caballero or office number [phone] [facility].

## 2017-02-24 NOTE — PLAN OF CARE
"Problem: Goal Outcome Summary  Goal: Goal Outcome Summary  Outcome: Improving  Evening RN  Pt arrived to floor from PACU at 1605 in a lot of pain, rating 10/10, stating, \"we aren't doing anything for her pain, we need to help her and to give her something stronger.\"  Pt refused to get off of cart until 0.5 mg of IV dilaudid was given.  Pt transferred to bed with hovermat, with no issues.  Called Dr. Eugene to inform of patients increase in pain, started on dilaudid PCA pump, pain team paged to see pt STAT.  VSS, afebrile.   IV valium and lidoderm patches ordered per pain team and started.  CMS intact, slight numbness to LLE.  Dressing CDI.  IV infusing.  Harper patent, draining.  Tolerating diet, will advance to regular diet for breakfast.  Resting in bed.     2245: Pt dangled at bedside and stood with A1, WW and back brace, no issues.  Ambulated around room with A1.  Stating, \"her pain is improved and she is no longer having any.\" Complaining of her harper catheter bothering her, requesting it be removed.  Per patients request, harper removed at 2300, pt is DTV.      "

## 2017-02-24 NOTE — PROGRESS NOTES
SPIRITUAL HEALTH SERVICES  SPIRITUAL ASSESSMENT Progress Note  [facility] Room #645    PRIMARY FOCUS:     Support for coping    ILLNESS CIRCUMSTANCES:   Reviewed documentation. Reflective conversation shared with Katy which integrated elements of illness and family narratives.     Context of Serious Illness/Symptom(s) - Katy had a lumber fusion performed yesterday. I visited Katy via a verbal consult from her doctor. There was an ALS  present during our visit.    Resources for Support - Mom, Roommate, Friend    DISTRESS:     Emotional/Existential/Relational Distress - None Indicated    Spiritual/Buddhism Distress - None Indicated    Social/Cultural/Economic Distress - None Indicated    SPIRITUAL/[place] COPING:     Worship/[place] - None Indicated    Spiritual Practice(s) - Prayer - Katy said she has many family and friends praying for her while she is here in the hospital.    Emotional/Existential/Relational Connections - None Indicated    GOALS OF CARE:    Goals of Care - Katy plans on going home today. She indicated that she is healing well as she has already walked with a walker, walked steps, and is sitting up. She has a friend who is picking her up to bring her home.    Meaning/Sense-Making - Not Applicable    PLAN: Katy is very appreciative of prayer from the chaplains, friends, and family. I prayed with her and let her know that  services are available per her request.      Isabel Ernst   Intern  [phone]

## 2017-02-24 NOTE — CONSULTS
IM consulted for post op management    Pt with h/o bipolar and narcotic abuse, h/o OD- pain team is already following and managing her pain prescription     Attempted to see pt who was working with OT, moreover she is planned to be dc today (orders already in by neurosurgery)    I have nothing to contribute to her care at this time. Will sign off, please call if any questions

## 2017-02-24 NOTE — PROGRESS NOTES
DAILY PROGRESS NOTE    Katy Islsa is a 26 year old old female admitted on 2/23/2017  6:26 AM.    Subjective  Comfortable      Objective    AAOx3, PEREZ , f/c 4/4   Ambulating,     Hemoglobin   Date Value Ref Range Status   02/13/2017 14.2 11.7 - 15.7 g/dL Final   ]      Impression / Plan       Plan for today:    Patient doing well  Ambulate with help  PT OT, possibly clearance   D/c harper this am  Full diet  Today  Wean and d/c PCA and transition to PO meds today   Started dilaudid PO if pain controlled and PT cleared patient can go home today

## 2017-02-24 NOTE — PLAN OF CARE
Problem: Goal Outcome Summary  Goal: Goal Outcome Summary  Outcome: Improving  Noc RN- Pt sedated at beginning of shift responding to touch, respirations 7-8/min while pt asleep. Continuous basal dose on pca discontinued at 0215, after explaining to pt concerns about her decreased respirations, pt states pain is better and his hesitant to use pca, minimal use throughout remainder of shift. Pt up & walked in hallway, tolerated well. Communicating with nurse by notepad.O2 sats > 95% on 2lnc

## 2017-02-24 NOTE — DISCHARGE INSTRUCTIONS
Opioid Medication Information    You have been given a prescription for an opioid (narcotic) pain medicine and/or have received a pain medicine while here in the hospital. These medicines can make you drowsy or impaired. You must not drive, operate dangerous equipment, or engage in any other dangerous activities while taking these medications. If you drive while taking these medications, you could be arrested for DUI, or driving under the influence. Do not drink any alcohol while you are taking these medications.   Opioid pain medications can cause addiction. If you have a history of chemical dependency of any type, you are at a higher risk of becoming addicted to pain medications.  Only take these prescribed medications to treat your pain when all other options have been tried. Take it for as short a time and as few doses as possible. Store your pain pills in a secure place, as they are frequently stolen and provide a dangerous opportunity for children or visitors in your house to start abusing these powerful medications. We will not replace any lost or stolen medicine.  As soon as your pain is better, you should flush all your remaining medication.   Many prescription pain medications contain Tylenol  (acetaminophen), including Vicodin , Tylenol #3 , Norco , Lortab , and Percocet .  You should not take any extra pills of Tylenol  if you are using these prescription medications or you can get very sick.  Do not ever take more than 4000 mg of acetaminophen in any 24 hour period.  All opioids tend to cause constipation. Drink plenty of water and eat foods that have a lot of fiber, such as fruits, vegetables, prune juice, apple juice and high fiber cereal.  Take a laxative if you don t move your bowels at least every other day. Miralax , Milk of Magnesia, Colace , or Senna  can be used to keep you regular.

## 2017-02-24 NOTE — PROGRESS NOTES
02/24/17 0944   Quick Adds   Type of Visit Initial PT Evaluation       Present yes   Language sign language   Living Environment   Lives With parent(s);other (see comments)   Living Arrangements house   Home Accessibility stairs within home   Number of Stairs to Enter Home 2  (2 rails)   Number of Stairs Within Home 12  (1 rail)   Living Environment Comment indep with dressing, bathing, before did cooking and cleaning, roommate Khai, bedroom upstairs, moms bedroom downstairs   Self-Care   Equipment Currently Used at Home none;crutches, axillary  (can borrow cane)   General Information   Onset of Illness/Injury or Date of Surgery - Date 02/23/17   Referring Physician Dr. Jeniffer Eugene   Patient/Family Goals Statement to return home today   Pertinent History of Current Problem (include personal factors and/or comorbidities that impact the POC) L LE radiculopathy, L4-5 DDD, s/p L4-5 OLLIF and JUAN   Precautions/Limitations spinal precautions   General Observations pt denies any LE Sx today   Cognitive Status Examination   Orientation orientation to person, place and time   Level of Consciousness alert   Follows Commands and Answers Questions 100% of the time;able to follow single-step instructions   Personal Safety and Judgment intact   Cognitive Comment wears hearing aides   Pain Assessment   Patient Currently in Pain Yes, see Vital Sign flowsheet  (4/10 at rest)   Bed Mobility   Bed Mobility Comments mod I with rail supine to sit   Transfer Skills   Transfer Comments mod I sit to stand with FWW   Gait   Gait Comments mod I with FWW, swing through gait, without walker tends to hold onto furniture for walking   General Therapy Interventions   Planned Therapy Interventions gait training;home program guidelines   Clinical Impression   Criteria for Skilled Therapeutic Intervention yes, treatment indicated   PT Diagnosis difficulty walking   Functional limitations due to impairments impaired gait  "  Clinical Presentation Stable/Uncomplicated   Clinical Decision Making (Complexity) Low complexity   Predicted Duration of Therapy Intervention (days/wks) 1 session   Anticipated Equipment Needs at Discharge front wheeled walker   Anticipated Discharge Disposition Home with Assist   Risk & Benefits of therapy have been explained Yes   Patient, Family & other staff in agreement with plan of care Yes   [facility] TM \"6 Clicks\"   2016, Trustees of [facility], under license to [employer].  All rights reserved.   6 Clicks Short Forms Basic Mobility Inpatient Short Form   [facility]  \"6 Clicks\" V.2 Basic Mobility Inpatient Short Form   1. Turning from your back to your side while in a flat bed without using bedrails? 3 - A Little   2. Moving from lying on your back to sitting on the side of a flat bed without using bedrails? 3 - A Little   3. Moving to and from a bed to a chair (including a wheelchair)? 3 - A Little   4. Standing up from a chair using your arms (e.g., wheelchair, or bedside chair)? 4 - None   5. To walk in hospital room? 3 - A Little   6. Climbing 3-5 steps with a railing? 4 - None   Basic Mobility Raw Score (Score out of 24.Lower scores equate to lower levels of function) 20   Total Evaluation Time   Total Evaluation Time (Minutes) 10     "

## 2017-02-24 NOTE — PLAN OF CARE
Problem: Individualization  Goal: Patient Preferences  Outcome: Adequate for Discharge Date Met:  02/24/17  Pt being discharged to home today with the help of her friend and support at home. Explained DC paperwork including s/s to monitor for, diet, activity, dressing changes and wound care.  New dressing placed. Pt has interpretor at time of dc. Meds given for dc, New medications were discussed and general questions answered.  Pt will follow up with MD as directed. No issues at time of dc.

## 2017-02-24 NOTE — PROGRESS NOTES
02/24/17 1035   Quick Adds   Type of Visit Initial Occupational Therapy Evaluation   Living Environment   Lives With parent(s)  (roommate, Khai)   Living Arrangements house   Home Accessibility stairs within home;bed and bath are not on the first floor;stairs to enter home   Number of Stairs to Enter Home 2   Number of Stairs Within Home 12   Living Environment Comment indep with dressing, bathing, before did cooking and cleaning, roommate Khai, bedroom upstairs, moms bedroom downstairs   Self-Care   Usual Activity Tolerance good   Current Activity Tolerance moderate   Equipment Currently Used at Home none   Functional Level Prior   Ambulation 0-->independent   Transferring 0-->independent   Toileting 0-->independent   Bathing 0-->independent   Dressing 0-->independent   Eating 0-->independent   Communication 0-->understands/communicates without difficulty   Swallowing 0-->swallows foods/liquids without difficulty   Cognition 0 - no cognition issues reported       Present yes   Language sign language   General Information   Onset of Illness/Injury or Date of Surgery - Date 02/23/17   Referring Physician Dr. Millan   Patient/Family Goals Statement to return home today   Additional Occupational Profile Info/Pertinent History of Current Problem L LE radiculopathy, L4-5 DDD, s/p L4-5 OLLIF and JUAN   Precautions/Limitations spinal precautions  (brace on when OOB)   General Observations patient was seated in chair and agreeable OT session   Cognitive Status Examination   Orientation orientation to person, place and time   Level of Consciousness alert   Able to Follow Commands WNL/WFL   Personal Safety (Cognitive) WNL/WFL   Memory intact   Visual Perception   Visual Perception Wears glasses   Pain Assessment   Patient Currently in Pain Yes, see Vital Sign flowsheet  (not rated)   Integumentary/Edema   Integumentary/Edema no deficits were identifed   Posture   Posture not impaired   Range of Motion  (ROM)   ROM Quick Adds No deficits were identified   Strength   Manual Muscle Testing Quick Adds No deficits were identified   Hand Strength   Hand Strength Comments intact   Muscle Tone Assessment   Muscle Tone Quick Adds No deficits were identified   Coordination   Upper Extremity Coordination No deficits were identified   Transfer Skill: Sit to Stand   Level of [place]: Sit/Stand stand-by assist   Physical Assist/Nonphysical Assist: Sit/Stand verbal cues   Transfer Skill: Sit to Stand full weight-bearing   Assistive Device for Transfer: Sit/Stand rolling walker   Toilet Transfer   Toilet Transfer Comments treatment initiated-defer to OT daily note for detail   Tub/Shower Transfer   Tub/Shower Transfer Comments treatment initiated-defer to OT daily note for detail   Balance   Balance Comments LOB was not noted   Upper Body Dressing   Level of [place]: Dress Upper Body (treatment initiated-defer to OT daily note for detail)   Lower Body Dressing   Level of [place]: Dress Lower Body (treatment initiated-defer to OT daily note for detail)   Grooming   Level of [place]: Grooming independent   Eating/Self Feeding   Level of [place]: Eating independent   Instrumental Activities of Daily Living (IADL)   IADL Comments patient's mon or roommate to complete as needed   Activities of Daily Living Analysis   Impairments Contributing to Impaired Activities of Daily Living post surgical precautions;pain;ROM decreased   General Therapy Interventions   Planned Therapy Interventions ADL retraining;progressive activity/exercise;transfer training;home program guidelines   Clinical Impression   Criteria for Skilled Therapeutic Interventions Met yes, treatment indicated   OT Diagnosis decreased ADL's   Influenced by the following impairments post surgical precautions;pain;ROM decreased   Assessment of Occupational Performance 3-5 Performance Deficits   Identified Performance Deficits decreased ADL's and  "IADL's- including dsg, bathing, toileting, household chores, driving community errands   Clinical Decision Making (Complexity) Low complexity   Therapy Frequency daily   Predicted Duration of Therapy Intervention (days/wks) 1 time session only   Anticipated Equipment Needs at Discharge bath sponge;reacher;sock aide;raised toilet seat   Anticipated Discharge Disposition Home   Risks and Benefits of Treatment have been explained. Yes   Patient, Family & other staff in agreement with plan of care Yes   [facility] TM \"6 Clicks\"   2016, Trustees of [facility], under license to [employer].  All rights reserved.   6 Clicks Short Forms Daily Activity Inpatient Short Form   [facility]  \"6 Clicks\" Daily Activity Inpatient Short Form   1. Putting on and taking off regular lower body clothing? 3 - A Little   2. Bathing (including washing, rinsing, drying)? 3 - A Little   3. Toileting, which includes using toilet, bedpan or urinal? 3 - A Little   4. Putting on and taking off regular upper body clothing? 3 - A Little   5. Taking care of personal grooming such as brushing teeth? 4 - None   6. Eating meals? 4 - None   Daily Activity Raw Score (Score out of 24.Lower scores equate to lower levels of function) 20   Total Evaluation Time   Total Evaluation Time (Minutes) 8     "

## 2017-02-24 NOTE — PLAN OF CARE
Problem: Goal Outcome Summary  Goal: Goal Outcome Summary  PT: Johnny complete, pt ambulated 300 ft and did 8 stairs with rails, pt motivated to participate in therapy. Educated on logroll for bed mobility, brace wear in the car, lifting restrictions, pt walks well with the walker and is requesting one for home. When not using the walker tends to reach out to hold onto furniture. Will place order for walker. Anticipate return home, pt hopes to go today. Lives with mother and roommate who can assist, has pets at home family can care for. No further PT needs.  Physical Therapy Discharge Summary     Reason for therapy discharge:    All goals and outcomes met, no further needs identified.     Progress towards therapy goal(s). See goals on Care Plan in [facility] electronic health record for goal details.  Goals met     Therapy recommendation(s):    Continue home exercise program. continue walking with nursing

## 2017-02-24 NOTE — PLAN OF CARE
Problem: Goal Outcome Summary  Goal: Goal Outcome Summary  Surgeon Discharge Plan: if pain controlled and PT cleared patient can go home today      Current Functional Status: Provided handout and education for spine precautions, progression of activity following surgery and  ADL techniques. After instruction, patient was mod I with LE dressing with reacher and sockaid, min A to with UE, however patient's mom will be able to A as needed. Vc's, CGA, fww to stand from chair, do to increased pain and difficulty from standing from chair, patient was advised to use RTS for toileting. SBA with toilet transfer from elevated toilet and support similar to what she will have at home. SBA for walk in shower, functional mobility in miller for over 300 feet and in her room while gathering supplies. Increased time to answer questions, address AE recommendations and how to obtain in the community. Recommended AE; reacher, sockaid, long handle sponge, RTS. Patient has met needed goals      Barriers to Plan/Home: stairs, defer to PT, none from OT standpoint     Occupational Therapy Discharge Summary     Reason for therapy discharge:    All goals and outcomes met, no further needs identified.     Progress towards therapy goal(s). See goals on Care Plan in [facility] electronic health record for goal details.  Goals met     Therapy recommendation(s):    No further therapy is recommended.

## 2017-02-24 NOTE — TELEPHONE ENCOUNTER
Left VM on office number at 12:30 pm    Spoke with provider Kavita Shannon (Pain clinic) at 12:30   - Getting discharged today   - Patient engaged in care, less anxious and concerned   - Sent home with Dilaudid 2 mg tablets,#60 tablets, no refills  - Physical therapy will be continuing   - OK for taper down from their prospective from Dilaudid  - They will not prescribe further, I will take over taper   - Discussed taper to percocet or Norco     Zach Tomlinson-LOVE Moss   Clinics - [place]

## 2017-02-27 ENCOUNTER — TELEPHONE (OUTPATIENT)
Dept: FAMILY MEDICINE | Facility: OTHER | Age: 27
End: 2017-02-27

## 2017-02-27 ENCOUNTER — OFFICE VISIT (OUTPATIENT)
Dept: FAMILY MEDICINE | Facility: OTHER | Age: 27
End: 2017-02-27
Payer: MEDICARE

## 2017-02-27 VITALS
HEART RATE: 91 BPM | SYSTOLIC BLOOD PRESSURE: 128 MMHG | HEIGHT: 61 IN | RESPIRATION RATE: 14 BRPM | BODY MASS INDEX: 27.94 KG/M2 | WEIGHT: 148 LBS | TEMPERATURE: 99.5 F | OXYGEN SATURATION: 97 % | DIASTOLIC BLOOD PRESSURE: 82 MMHG

## 2017-02-27 DIAGNOSIS — G89.29 CHRONIC BILATERAL LOW BACK PAIN WITH LEFT-SIDED SCIATICA: ICD-10-CM

## 2017-02-27 DIAGNOSIS — M51.16 LUMBAR DISC DISEASE WITH RADICULOPATHY: Primary | ICD-10-CM

## 2017-02-27 DIAGNOSIS — J30.1 SEASONAL ALLERGIC RHINITIS DUE TO POLLEN: ICD-10-CM

## 2017-02-27 DIAGNOSIS — M54.42 CHRONIC BILATERAL LOW BACK PAIN WITH LEFT-SIDED SCIATICA: ICD-10-CM

## 2017-02-27 DIAGNOSIS — M54.50 LEFT-SIDED LOW BACK PAIN WITHOUT SCIATICA, UNSPECIFIED CHRONICITY: Primary | ICD-10-CM

## 2017-02-27 DIAGNOSIS — M51.16 LUMBAR DISC DISEASE WITH RADICULOPATHY: ICD-10-CM

## 2017-02-27 DIAGNOSIS — G43.709 CHRONIC MIGRAINE WITHOUT AURA WITHOUT STATUS MIGRAINOSUS, NOT INTRACTABLE: ICD-10-CM

## 2017-02-27 PROCEDURE — 99214 OFFICE O/P EST MOD 30 MIN: CPT | Performed by: PHYSICIAN ASSISTANT

## 2017-02-27 RX ORDER — HYDROCODONE BITARTRATE 10 MG/1
10 CAPSULE, EXTENDED RELEASE ORAL 2 TIMES DAILY
Qty: 60 CAPSULE | Refills: 0 | Status: SHIPPED | OUTPATIENT
Start: 2017-02-27 | End: 2017-03-13 | Stop reason: ALTCHOICE

## 2017-02-27 RX ORDER — LIDOCAINE 50 MG/G
PATCH TOPICAL
Qty: 30 PATCH | Refills: 3 | Status: SHIPPED | OUTPATIENT
Start: 2017-02-27 | End: 2017-04-14

## 2017-02-27 RX ORDER — FLUTICASONE PROPIONATE 50 MCG
2 SPRAY, SUSPENSION (ML) NASAL DAILY
Qty: 1 BOTTLE | Refills: 3 | Status: SHIPPED | OUTPATIENT
Start: 2017-02-27 | End: 2017-10-24

## 2017-02-27 RX ORDER — HYDROCODONE BITARTRATE AND ACETAMINOPHEN 5; 325 MG/1; MG/1
1 TABLET ORAL EVERY 4 HOURS PRN
Qty: 60 TABLET | Refills: 0 | Status: SHIPPED | OUTPATIENT
Start: 2017-02-27 | End: 2017-03-03 | Stop reason: ALTCHOICE

## 2017-02-27 RX ORDER — SUMATRIPTAN 50 MG/1
50 TABLET, FILM COATED ORAL
Qty: 18 TABLET | Refills: 3 | Status: SHIPPED | OUTPATIENT
Start: 2017-02-27 | End: 2017-03-28

## 2017-02-27 ASSESSMENT — PAIN SCALES - GENERAL: PAINLEVEL: SEVERE PAIN (6)

## 2017-02-27 NOTE — TELEPHONE ENCOUNTER
S-(situation): Patient is calling today with itchiness and cough    B-(background): Patient had back surgery last week and was started on Dilaudid.    A-(assessment): Taking one tab every three hours or so. She just took a dose. She states she itches all over from the medication and would like to discuss switching to Norco. She also has a cough with green phlegm that is making her back hurt worse.     Pain scale (1-10): 4/10   Denies: Difficulty breathing, rashes, or swelling   Meds/MeasuresTried: Just took a Benadryl.       R-(recommendations):  Huddled with CDL, patient to be seen today at 2 pm.  Will comply with recommendation: YES   If further questions/concerns or if Sx do not improve, worsen or new Sx develop, call your PCP or [facility] as soon as possible.    NOTES:  Disposition was determined by the first positive assessment question, therefore all previous assessment questions were negative.     Guideline used:  Telephone Triage Protocols for Nurses, Fourth Edition, Telma Roy, MAUREEN, BSN

## 2017-02-27 NOTE — TELEPHONE ENCOUNTER
"The Lidocaine/Menthol 4-1% patches (Zims Max-Freeze 4-1% ptch) come up \"OTC - Not covered\".    Tried a test claim for Lidoderm 5% patches and they come up \"Prior authorization required\".     Patient would like clinic to try to get a PA for the Lidoderm 5% patches.    PA needed for: Lidocaine 5% patches (ndc [address]-66)  Insurance:  [employer]  Insur phone:  [phone]  Patient ID: [id number]    If PA is approved we will also need a new rx.   Thank you     -Malou Butler, Pharm.D., [facility], [phone]    "

## 2017-02-27 NOTE — TELEPHONE ENCOUNTER
"Hydrocodone ER 10mg tablets (Zohydro ER 10mg tabs - ndc [address]-40) - Coming up \"Product/service not covered\" on her insurance.     Please change med or contact insurance for a Prior Auth.    PA needed for: Zohydro ER 10mg tabs  Insurance:  [employer] (this is a Medicare plan)  Insur phone:  [phone]  Patient ID: [id number]    Thank you     -Malou Butler, Pharm.D., [facility], [phone]    "

## 2017-02-27 NOTE — MR AVS SNAPSHOT
After Visit Summary   2/27/2017    Katy Islas    MRN: [id number]           Patient Information     Date Of Birth          1990        Visit Information        Provider Department      2/27/2017 2:00 PM Aura Rosario PA-C; [facility]        Today's Diagnoses     Left-sided low back pain without sciatica, unspecified chronicity    -  1    Lumbar disc disease with radiculopathy        Chronic bilateral low back pain with left-sided sciatica        Chronic migraine without aura without status migrainosus, not intractable        Seasonal allergic rhinitis due to pollen          Care Instructions    - Recheck in 2 weeks             Follow-ups after your visit        Additional Services     PHYSICAL THERAPY REFERRAL       *This therapy referral will be filtered to a centralized scheduling office at [facility] and the patient will receive a call to schedule an appointment at a [facility] location most convenient for them. *     [facility] provides Physical Therapy evaluation and treatment and many specialty services across the [facility] system.  If requesting a specialty program, please choose from the list below.    If you have not heard from the scheduling office within 2 business days, please call [phone] for all locations, with the exception of [place], please call [phone].  Treatment: Evaluation & Treatment  Special Instructions/Modalities: s/p back surgery  Special Programs: Aquatic Therapy ([facility] & [facility] locations only)    Please be aware that coverage of these services is subject to the terms and limitations of your health insurance plan.  Call member services at your health plan with any benefit or coverage questions.      **Note to Provider:  If you are referring outside of [facility] for the therapy appointment, please list the name of the location in the  special instructions  above, print the  referral and give to the patient to schedule the appointment.                  Your next 10 appointments already scheduled     Feb 28, 2017 11:00 AM CST   MyChart Long with Aura Rosario PA-C, Dayana Roldan   [facility]    [address] [id number] [phone]            Mar 21, 2017 11:00 AM CDT   Return Visit with Natalie Zimmerman   [facility]    [address] [id number] [phone]              Who to contact     If you have questions or need follow up information about today's clinic visit or your schedule please contact [facility] directly at [phone].  Normal or non-critical lab and imaging results will be communicated to you by MyChart, letter or phone within 4 business days after the clinic has received the results. If you do not hear from us within 7 days, please contact the clinic through [employer] or phone. If you have a critical or abnormal lab result, we will notify you by phone as soon as possible.  Submit refill requests through [employer] or call your pharmacy and they will forward the refill request to us. Please allow 3 business days for your refill to be completed.          Additional Information About Your Visit        MyChart Information     [employer] gives you secure access to your electronic health record. If you see a primary care provider, you can also send messages to your care team and make appointments. If you have questions, please call your primary care clinic.  If you do not have a primary care provider, please call [phone] and they will assist you.        Care EveryWhere ID     This is your Care EveryWhere ID. This could be used by other organizations to access your [facility] medical records  [id number]        Your Vitals Were     Pulse Temperature Respirations Height Last Period Pulse Oximetry    91 99.5  " F (37.5  C) (Oral) 14 5' 1\" (1.549 m) 02/02/2012 97%    BMI (Body Mass Index)                   27.96 kg/m2            Blood Pressure from Last 3 Encounters:   02/27/17 128/82   02/24/17 137/86   02/13/17 114/80    Weight from Last 3 Encounters:   02/27/17 148 lb (67.1 kg)   02/23/17 147 lb (66.7 kg)   02/13/17 147 lb (66.7 kg)              We Performed the Following     PHYSICAL THERAPY REFERRAL          Today's Medication Changes          These changes are accurate as of: 2/27/17  2:49 PM.  If you have any questions, ask your nurse or doctor.               Start taking these medicines.        Dose/Directions    HYDROcodone Bitartrate ER 10 MG Cp12   Used for:  Left-sided low back pain without sciatica, unspecified chronicity, Lumbar disc disease with radiculopathy, Chronic bilateral low back pain with left-sided sciatica   Started by:  Aura Rosario PA-C        Dose:  10 mg   Take 10 mg by mouth 2 times daily   Quantity:  60 capsule   Refills:  0         These medicines have changed or have updated prescriptions.        Dose/Directions    * HYDROcodone-acetaminophen 5-325 MG per tablet   Commonly known as:  NORCO   This may have changed:  Another medication with the same name was added. Make sure you understand how and when to take each.   Used for:  Left-sided low back pain without sciatica, unspecified chronicity   Changed by:  Aura Rosario PA-C        Dose:  1-2 tablet   Take 1-2 tablets by mouth nightly as needed for moderate to severe pain   Quantity:  30 tablet   Refills:  0       * HYDROcodone-acetaminophen 5-325 MG per tablet   Commonly known as:  NORCO   This may have changed:  You were already taking a medication with the same name, and this prescription was added. Make sure you understand how and when to take each.   Used for:  Left-sided low back pain without sciatica, unspecified chronicity   Changed by:  Aura Rosario PA-C        Dose:  1 tablet "   Take 1 tablet by mouth every 4 hours as needed for pain maximum 6 tablet(s) per day   Quantity:  60 tablet   Refills:  0       * Notice:  This list has 2 medication(s) that are the same as other medications prescribed for you. Read the directions carefully, and ask your doctor or other care provider to review them with you.      Stop taking these medicines if you haven't already. Please contact your care team if you have questions.     HYDROmorphone 2 MG tablet   Commonly known as:  DILAUDID   Stopped by:  Aura Rosario PA-C                Where to get your medicines      These [place] [facility] [place] [address]  [address], [facility] [address]     Phone:  [phone]     fluticasone 50 MCG/ACT spray    Lidocaine-Menthol 4-1 % Ptch    SUMAtriptan 50 MG tablet         Some of these will need a paper prescription and others can be bought over the counter.  Ask your nurse if you have questions.     Bring a paper prescription for each of these medications     HYDROcodone Bitartrate ER 10 MG Cp12    HYDROcodone-acetaminophen 5-325 MG per tablet                Primary Care Provider Office Phone # Fax #    Aura Rosario PA-C [phone]       [facility] [address]  [facility] [address]        Thank you!     Thank you for choosing [facility]  for your care. Our goal is always to provide you with excellent care. Hearing back from our patients is one way we can continue to improve our services. Please take a few minutes to complete the written survey that you may receive in the mail after your visit with us. Thank you!             Your Updated Medication List - Protect others around you: Learn how to safely use, store and throw away your medicines at www.disposemymeds.org.          This list is accurate as of: 2/27/17  2:49 PM.  Always use your most recent med list.                    Brand Name Dispense Instructions for use    ALLEGRA 180 MG tablet   Generic drug:  fexofenadine      Take 180 mg by mouth daily       ARIPiprazole 2 MG tablet    ABILIFY    90 tablet    Take 1 tablet (2 mg) by mouth At Bedtime       cyclobenzaprine 10 MG tablet    FLEXERIL    60 tablet    Take 1 tablet (10 mg) by mouth 2 times daily as needed for muscle spasms or other (back pain)       dexlansoprazole 60 MG Cpdr CR capsule    DEXILANT    90 capsule    Take 1 capsule (60 mg) by mouth daily       dicyclomine 20 MG tablet    BENTYL    30 tablet    Take 1 tablet (20 mg) by mouth 4 times daily as needed (ABDOMINAL CRAMPING) AS NEEDED FOR CRAMPING       DULoxetine 60 MG EC capsule    CYMBALTA    90 capsule    Take 1 capsule (60 mg) by mouth daily       EPINEPHrine 0.3 MG/0.3ML injection     0.6 mL    Inject 0.3 mLs (0.3 mg) into the muscle once as needed for anaphylaxis       FLEX-A-MIN JOINT FLEX PO      Take by mouth daily       fluticasone 50 MCG/ACT spray    FLONASE    1 Bottle    Spray 2 sprays into both nostrils daily       fluticasone-salmeterol 100-50 MCG/DOSE diskus inhaler    ADVAIR    1 Inhaler    Inhale 1 puff into the lungs 2 times daily       gabapentin 300 MG capsule    NEURONTIN    270 capsule    Take 1 capsule (300 mg) by mouth 3 times daily       HYDROcodone Bitartrate ER 10 MG Cp12     60 capsule    Take 10 mg by mouth 2 times daily       * HYDROcodone-acetaminophen 5-325 MG per tablet    NORCO    30 tablet    Take 1-2 tablets by mouth nightly as needed for moderate to severe pain       * HYDROcodone-acetaminophen 5-325 MG per tablet    NORCO    60 tablet    Take 1 tablet by mouth every 4 hours as needed for pain maximum 6 tablet(s) per day       Lidocaine-Menthol 4-1 % Ptch     30 patch    Externally apply 2 patches topically At Bedtime       medical cannabis inhalation (Patient's own supply.  Not a prescription)      Inhale into the lungs 3 times daily as needed Reported on 2/13/2017        Multi-vitamin Tabs tablet   Generic drug:  multivitamin, therapeutic with minerals      Take 1 tablet by mouth daily.       mupirocin 2 % nasal ointment    BACTROBAN     Apply 1 g into each nare 2 times daily       naproxen 500 MG tablet    NAPROSYN    180 tablet    Take 1 tablet (500 mg) by mouth 2 times daily as needed for moderate pain       ondansetron 4 MG ODT tab    ZOFRAN ODT    30 tablet    Take 1-2 tablets (4-8 mg) by mouth every 6 hours as needed for nausea Take 1-2 tablets by mouth as needed for nausea       ondansetron 4 MG tablet    ZOFRAN    30 tablet    Take 1 tablet (4 mg) by mouth every 8 hours as needed for nausea       * order for DME     1 Units    Equipment being ordered: Nebulizer       * order for DME     1 each    Equipment being ordered: Walker () Treatment Diagnosis: S/P SPINE SURGERY       * order for DME     1 each    Equipment being ordered: Walker Wheels () and Walker () Treatment Diagnosis: difficulty walking       promethazine 25 MG tablet    PHENERGAN     Take by mouth every 6 hours as needed for nausea       sucralfate 1 GM tablet    CARAFATE     Take by mouth 4 times daily Reported on 2/13/2017       SUMAtriptan 50 MG tablet    IMITREX    18 tablet    Take 1 tablet (50 mg) by mouth at onset of headache for migraine May repeat in 2 hours. Max 8 tablets/24 hours.       vitamin D 65739 UNIT capsule    ERGOCALCIFEROL    8 capsule    Take 1 capsule (50,000 Units) by mouth every 7 days for 8 doses       Vitamin D-3 5000 UNITS Tabs      Take by mouth daily Reported on 2/13/2017       * Notice:  This list has 5 medication(s) that are the same as other medications prescribed for you. Read the directions carefully, and ask your doctor or other care provider to review them with you.

## 2017-02-27 NOTE — PROGRESS NOTES
SUBJECTIVE:                                                    Katy Islas is a 26 year old female who presents to clinic today for the following health issues:      HPI    Patient would like a refill on Imitrex, & Lidocaine Patches.  She stopped taking the dilaudid since it made her itch.  Patient is currently only taking Tylenol- which is not helping.      Patient would like a referral to Physical Therapy at [facility]- for Pool Therapy.        Acute Illness   Acute illness concerns: Cough  Onset: Since Surgery    Fever: no     Chills/Sweats: no     Headache (location?): YES- last week    Sinus Pressure:no    Conjunctivitis:  No    Ear Pain: no    Rhinorrhea: YES- Nose Bleeds    Congestion: YES-     Sore Throat: no      Cough: YES-productive of green sputum    Wheeze: no     Decreased Appetite: no     Nausea: YES    Vomiting: no     Diarrhea:  no     Dysuria/Freq.: no     Fatigue/Achiness: YES    Sick/Strep Exposure: no      Therapies Tried and outcome:     Back Pain Follow Up       Description:   Location of pain:  Left- Left  Character of pain: dull ache  Pain radiation: Does not radiate and radiates into the left buttocks- has gotten better  Since last visit, pain is:    New numbness or weakness in legs, not attributed to pain:  no     Intensity: Currently 6/10    History:   Pain interferes with job: Not applicable  Therapies tried without relief: Physical Therapy  Therapies tried with relief: Water Therapy        Accompanying Signs & Symptoms:  Risk of Fracture:  None  Risk of Cauda Equina:  None  Risk of Infection:  None  Risk of Cancer:  None           Problem list and histories reviewed & adjusted, as indicated.  Additional history: as documented    Problem list, Medication list, Allergies, and Medical/Social/Surgical histories reviewed in [facility] and updated as appropriate.    ROS:  Constitutional, HEENT, cardiovascular, pulmonary, gi and gu systems are negative, except as otherwise noted.    OBJECTIVE:       "                                              /82 (BP Location: Left arm, Patient Position: Supine, Cuff Size: Adult Regular)  Pulse 91  Temp 99.5  F (37.5  C) (Oral)  Resp 14  Ht 5' 1\" (1.549 m)  Wt 148 lb (67.1 kg)  LMP 02/02/2012  SpO2 97%  BMI 27.96 kg/m2  Body mass index is 27.96 kg/(m^2).  GENERAL APPEARANCE: healthy, alert and no distress  EYES: Eyes grossly normal to inspection, PERRLA, conjunctivae and sclerae without injection or discharge, EOM intact   HENT: Bilateral ear canals without erythema or cerumen, bilateral TM's pearly grey with normal light reflex, no effusion, injection, or bulging, nasal turbinates without swelling, erythema, or discharge, mouth without ulcers or lesions, oropharynx clear and oral mucous membranes moist, no sinus tenderness   NECK: No adenopathy in cervical, supraclavicular, or axillary regions, no asymmetry, masses, or scars, and thyroid normal to palpation, no JVD   RESP: Lungs clear to auscultation - no rales, rhonchi or wheezes   CV: Regular rates and rhythm, normal S1 S2, no S3 or S4, no murmur, click or rub  MS: No musculoskeletal defects are noted and gait is age appropriate without ataxia   SKIN: Well healing incisions on bilateral low back, no other suspicious lesions or rashes, hydration status appears adeuqate with normal skin turgor   PSYCH: Alert and oriented x3; speech- coherent , normal rate and volume; able to articulate logical thoughts, able to abstract reason, no tangential thoughts, no hallucinations or delusions, mentation appears normal, Mood is euthymic. Affect is appropriate for this mood state and bright. Thought content is free of suicidal ideation, hallucinations, and delusions. Dress is adequate and upkept. Eye contact is good during conversation.       Diagnostic Test Results:  none      ASSESSMENT/PLAN:                                                        ICD-10-CM    1. Left-sided low back pain without sciatica, unspecified chronicity " M54.5 HYDROcodone-acetaminophen (NORCO) 5-325 MG per tablet     PHYSICAL THERAPY REFERRAL     HYDROcodone Bitartrate ER 10 MG CP12   2. Lumbar disc disease with radiculopathy M51.16 PHYSICAL THERAPY REFERRAL     HYDROcodone Bitartrate ER 10 MG CP12     DISCONTINUED: Lidocaine-Menthol 4-1 % PTCH   3. Chronic bilateral low back pain with left-sided sciatica M54.42 PHYSICAL THERAPY REFERRAL    G89.29 HYDROcodone Bitartrate ER 10 MG CP12     DISCONTINUED: Lidocaine-Menthol 4-1 % PTCH   4. Chronic migraine without aura without status migrainosus, not intractable G43.709 SUMAtriptan (IMITREX) 50 MG tablet   5. Seasonal allergic rhinitis due to pollen J30.1 fluticasone (FLONASE) 50 MCG/ACT spray       1-3.   - Will send message to patient's surgeon regarding her questions about when to leave dressing off and when she can start physical therapy   - I did provide a referral back to [facility] physical therapy   - Will have patient STOP Dilaudid due to itching   - She has done well on Norco in the past, will do the ER version (without tylenol  twice a day with Norco with tylenol for breakthrough pain   - Reviewed use and side effects   - Recheck 2 weeks     4. Migraines   - Seemed to be worsened by chronic tylenol use, we will do mostly without the tylenol as above for pain management   - Refilled Imitrex as needed, reviewed use and side effects     5.   - Discussed no signs of infection, patient states allergies to pollen and mold, discussed mold count has been very high this early spring  - Recommend restart her Flonase, discussed use and side effects   - If symptoms continue or worsen, return to clinic     The patient indicates understanding of these issues and agrees with the plan.    Follow up: 2 weeks         Aura Rosario PA-C  [facility]

## 2017-02-27 NOTE — TELEPHONE ENCOUNTER
I called and was on a lengthy hold and person I was dealing with disconnected me.  Will try tomorrow.

## 2017-02-28 ENCOUNTER — MYC MEDICAL ADVICE (OUTPATIENT)
Dept: FAMILY MEDICINE | Facility: OTHER | Age: 27
End: 2017-02-28

## 2017-02-28 ENCOUNTER — HOSPITAL ENCOUNTER (EMERGENCY)
Facility: CLINIC | Age: 27
Discharge: HOME | End: 2017-02-28
Attending: EMERGENCY MEDICINE | Admitting: EMERGENCY MEDICINE
Payer: MEDICARE

## 2017-02-28 VITALS
TEMPERATURE: 99.1 F | BODY MASS INDEX: 27.4 KG/M2 | RESPIRATION RATE: 20 BRPM | WEIGHT: 145 LBS | OXYGEN SATURATION: 94 % | HEART RATE: 96 BPM | DIASTOLIC BLOOD PRESSURE: 112 MMHG | SYSTOLIC BLOOD PRESSURE: 129 MMHG

## 2017-02-28 DIAGNOSIS — R50.82 POSTOPERATIVE FEVER: ICD-10-CM

## 2017-02-28 DIAGNOSIS — Z98.1 S/P SPINAL FUSION: Primary | ICD-10-CM

## 2017-02-28 LAB
ANION GAP SERPL CALCULATED.3IONS-SCNC: 6 MMOL/L (ref 3–14)
BASOPHILS # BLD AUTO: 0 10E9/L (ref 0–0.2)
BASOPHILS NFR BLD AUTO: 0.2 %
BUN SERPL-MCNC: 10 MG/DL (ref 7–30)
CALCIUM SERPL-MCNC: 8.8 MG/DL (ref 8.5–10.1)
CHLORIDE SERPL-SCNC: 105 MMOL/L (ref 94–109)
CO2 SERPL-SCNC: 31 MMOL/L (ref 20–32)
CREAT SERPL-MCNC: 0.68 MG/DL (ref 0.52–1.04)
CRP SERPL-MCNC: 10.9 MG/L (ref 0–8)
DIFFERENTIAL METHOD BLD: ABNORMAL
EOSINOPHIL # BLD AUTO: 0.1 10E9/L (ref 0–0.7)
EOSINOPHIL NFR BLD AUTO: 1.4 %
ERYTHROCYTE [DISTWIDTH] IN BLOOD BY AUTOMATED COUNT: 12 % (ref 10–15)
GFR SERPL CREATININE-BSD FRML MDRD: ABNORMAL ML/MIN/1.7M2
GLUCOSE SERPL-MCNC: 115 MG/DL (ref 70–99)
HCT VFR BLD AUTO: 33.1 % (ref 35–47)
HGB BLD-MCNC: 11.2 G/DL (ref 11.7–15.7)
IMM GRANULOCYTES # BLD: 0 10E9/L (ref 0–0.4)
IMM GRANULOCYTES NFR BLD: 0 %
LYMPHOCYTES # BLD AUTO: 1.7 10E9/L (ref 0.8–5.3)
LYMPHOCYTES NFR BLD AUTO: 27.7 %
MCH RBC QN AUTO: 33.7 PG (ref 26.5–33)
MCHC RBC AUTO-ENTMCNC: 33.8 G/DL (ref 31.5–36.5)
MCV RBC AUTO: 100 FL (ref 78–100)
MONOCYTES # BLD AUTO: 0.6 10E9/L (ref 0–1.3)
MONOCYTES NFR BLD AUTO: 10.2 %
NEUTROPHILS # BLD AUTO: 3.8 10E9/L (ref 1.6–8.3)
NEUTROPHILS NFR BLD AUTO: 60.5 %
PLATELET # BLD AUTO: 239 10E9/L (ref 150–450)
POTASSIUM SERPL-SCNC: 4.1 MMOL/L (ref 3.4–5.3)
RBC # BLD AUTO: 3.32 10E12/L (ref 3.8–5.2)
SODIUM SERPL-SCNC: 142 MMOL/L (ref 133–144)
WBC # BLD AUTO: 6.3 10E9/L (ref 4–11)

## 2017-02-28 PROCEDURE — 85025 COMPLETE CBC W/AUTO DIFF WBC: CPT | Performed by: EMERGENCY MEDICINE

## 2017-02-28 PROCEDURE — 96375 TX/PRO/DX INJ NEW DRUG ADDON: CPT

## 2017-02-28 PROCEDURE — 25000128 H RX IP 250 OP 636: Performed by: EMERGENCY MEDICINE

## 2017-02-28 PROCEDURE — 80048 BASIC METABOLIC PNL TOTAL CA: CPT | Performed by: EMERGENCY MEDICINE

## 2017-02-28 PROCEDURE — 86140 C-REACTIVE PROTEIN: CPT | Performed by: EMERGENCY MEDICINE

## 2017-02-28 PROCEDURE — 99283 EMERGENCY DEPT VISIT LOW MDM: CPT | Performed by: EMERGENCY MEDICINE

## 2017-02-28 PROCEDURE — 99285 EMERGENCY DEPT VISIT HI MDM: CPT | Mod: 25

## 2017-02-28 PROCEDURE — 96374 THER/PROPH/DIAG INJ IV PUSH: CPT

## 2017-02-28 RX ORDER — CEPHALEXIN 500 MG/1
500 CAPSULE ORAL 4 TIMES DAILY
Qty: 28 CAPSULE | Refills: 0 | Status: SHIPPED | OUTPATIENT
Start: 2017-02-28 | End: 2017-03-07

## 2017-02-28 RX ORDER — LIDOCAINE 40 MG/G
CREAM TOPICAL
Status: DISCONTINUED | OUTPATIENT
Start: 2017-02-28 | End: 2017-03-01 | Stop reason: HOSPADM

## 2017-02-28 RX ORDER — DIPHENHYDRAMINE HYDROCHLORIDE 50 MG/ML
25 INJECTION INTRAMUSCULAR; INTRAVENOUS ONCE
Status: COMPLETED | OUTPATIENT
Start: 2017-02-28 | End: 2017-02-28

## 2017-02-28 RX ORDER — FLUCONAZOLE 150 MG/1
TABLET ORAL
Qty: 1 TABLET | Refills: 0 | Status: SHIPPED | OUTPATIENT
Start: 2017-02-28 | End: 2017-03-03

## 2017-02-28 RX ORDER — OXYCODONE HYDROCHLORIDE 5 MG/1
5 TABLET ORAL EVERY 4 HOURS PRN
Qty: 20 TABLET | Refills: 0 | Status: SHIPPED | OUTPATIENT
Start: 2017-02-28 | End: 2017-03-03

## 2017-02-28 RX ADMIN — HYDROMORPHONE HYDROCHLORIDE 1 MG: 1 INJECTION, SOLUTION INTRAMUSCULAR; INTRAVENOUS; SUBCUTANEOUS at 23:20

## 2017-02-28 RX ADMIN — DIPHENHYDRAMINE HYDROCHLORIDE 25 MG: 50 INJECTION, SOLUTION INTRAMUSCULAR; INTRAVENOUS at 23:19

## 2017-02-28 NOTE — TELEPHONE ENCOUNTER
Can start OTC Senna twice a day. Continue miralax until has a bowel movement, then just stay on Senna alone.     Zach Rsoario PA-C  [facility]

## 2017-02-28 NOTE — TELEPHONE ENCOUNTER
Replied to patient via MyChart. Will do short trial of Oxycodone. Small supply ordered. Should stop Norco and take Oxycodone with Benadryl.   Rx signed and placed in MA task. Please walk to [facility] pharmacy.    Zach Rosario PA-C  [facility]

## 2017-02-28 NOTE — ED AVS SNAPSHOT
[facility] Emergency Department    [address] [place] [address]    Phone:  [phone]    Fax:  [phone]                                       Katy Islas   MRN: [id number]    Department:  [facility] Emergency Department   Date of Visit:  2/28/2017           After Visit Summary Signature Page     I have received my discharge instructions, and my questions have been answered. I have discussed any challenges I see with this plan with the nurse or doctor.    ..........................................................................................................................................  Patient/Patient Representative Signature      ..........................................................................................................................................  Patient Representative Print Name and Relationship to Patient    ..................................................               ................................................  Date                                            Time    ..........................................................................................................................................  Reviewed by Signature/Title    ...................................................              ..............................................  Date                                                            Time

## 2017-02-28 NOTE — ED AVS SNAPSHOT
[facility]    [address] [place] [address]    Phone:  [phone]    Fax:  [phone]                                       Katy Islas   MRN: [id number]    Department:  [facility]   Date of Visit:  2/28/2017           Patient Information     Date Of Birth          1990        Your diagnoses for this visit were:     Postoperative fever        You were seen by Seun Chadwick MD.      Follow-up Information     Follow up with Aura Rosario PA-C.    Specialty:  Physician Assistant - Medical    Why:  As needed    Contact information:    [facility]  [address] [facility] [address]  [facility] [address]  [phone]        Discharge References/Attachments     POST OP WOUND CHECK, GENERAL (ENGLISH)      Future Appointments        Provider Department Dept Phone Center    3/13/2017 1:00 PM Shira Faustin; Aura Rosario PA-C [facility] [phone] [facility]    3/21/2017 11:00 AM Natalie Zimmerman [facility] [phone] [facility]      24 Hour Appointment Hotline       To make an appointment at any [facility], call [phone] ([phone]). If you don't have a family doctor or clinic, we will help you find one. [facility] are conveniently located to serve the needs of you and your family.             Review of your medicines      START taking        Dose / Directions Last dose taken    cephALEXin 500 MG capsule   Commonly known as:  KEFLEX   Dose:  500 mg   Quantity:  28 capsule        Take 1 capsule (500 mg) by mouth 4 times daily for 7 days   Refills:  0        fluconazole 150 MG tablet   Commonly known as:  DIFLUCAN   Quantity:  1 tablet        Take one tablet at end of antibiotics   Refills:  0          Our records show that you are taking the medicines listed below. If these are incorrect, please call your family doctor or  clinic.        Dose / Directions Last dose taken    ALLEGRA 180 MG tablet   Dose:  180 mg   Generic drug:  fexofenadine        Take 180 mg by mouth daily   Refills:  0        ARIPiprazole 2 MG tablet   Commonly known as:  ABILIFY   Dose:  2 mg   Quantity:  90 tablet        Take 1 tablet (2 mg) by mouth At Bedtime   Refills:  3        cyclobenzaprine 10 MG tablet   Commonly known as:  FLEXERIL   Dose:  10 mg   Quantity:  60 tablet        Take 1 tablet (10 mg) by mouth 2 times daily as needed for muscle spasms or other (back pain)   Refills:  3        dexlansoprazole 60 MG Cpdr CR capsule   Commonly known as:  DEXILANT   Dose:  60 mg   Quantity:  90 capsule        Take 1 capsule (60 mg) by mouth daily   Refills:  3        dicyclomine 20 MG tablet   Commonly known as:  BENTYL   Dose:  20 mg   Quantity:  30 tablet        Take 1 tablet (20 mg) by mouth 4 times daily as needed (ABDOMINAL CRAMPING) AS NEEDED FOR CRAMPING   Refills:  0        DULoxetine 60 MG EC capsule   Commonly known as:  CYMBALTA   Dose:  60 mg   Quantity:  90 capsule        Take 1 capsule (60 mg) by mouth daily   Refills:  3        EPINEPHrine 0.3 MG/0.3ML injection   Dose:  0.3 mg   Quantity:  0.6 mL        Inject 0.3 mLs (0.3 mg) into the muscle once as needed for anaphylaxis   Refills:  1        FLEX-A-MIN JOINT FLEX PO        Take by mouth daily   Refills:  0        fluticasone 50 MCG/ACT spray   Commonly known as:  FLONASE   Dose:  2 spray   Quantity:  1 Bottle        Spray 2 sprays into both nostrils daily   Refills:  3        fluticasone-salmeterol 100-50 MCG/DOSE diskus inhaler   Commonly known as:  ADVAIR   Dose:  1 puff   Quantity:  1 Inhaler        Inhale 1 puff into the lungs 2 times daily   Refills:  5        gabapentin 300 MG capsule   Commonly known as:  NEURONTIN   Dose:  300 mg   Quantity:  270 capsule        Take 1 capsule (300 mg) by mouth 3 times daily   Refills:  1        HYDROcodone Bitartrate ER 10 MG Cp12   Dose:  10 mg    Quantity:  60 capsule        Take 10 mg by mouth 2 times daily   Refills:  0        * HYDROcodone-acetaminophen 5-325 MG per tablet   Commonly known as:  NORCO   Dose:  1-2 tablet   Quantity:  30 tablet        Take 1-2 tablets by mouth nightly as needed for moderate to severe pain   Refills:  0        * HYDROcodone-acetaminophen 5-325 MG per tablet   Commonly known as:  NORCO   Dose:  1 tablet   Quantity:  60 tablet        Take 1 tablet by mouth every 4 hours as needed for pain maximum 6 tablet(s) per day   Refills:  0        lidocaine 5 % Patch   Commonly known as:  LIDODERM   Quantity:  30 patch        Apply up to 3 patches to painful area at once for up to 12 h within a 24 h period.  Remove after 12 hours.   Refills:  3        medical cannabis inhalation (Patient's own supply.  Not a prescription)        Inhale into the lungs 3 times daily as needed Reported on 2/13/2017   Refills:  0        Multi-vitamin Tabs tablet   Dose:  1 tablet   Generic drug:  multivitamin, therapeutic with minerals        Take 1 tablet by mouth daily.   Refills:  0        mupirocin 2 % nasal ointment   Commonly known as:  BACTROBAN   Dose:  1 g   Indication:  Apply small amount to each nostril twice daily for 7 days prior to surgery.        Apply 1 g into each nare 2 times daily   Refills:  0        naproxen 500 MG tablet   Commonly known as:  NAPROSYN   Dose:  500 mg   Quantity:  180 tablet        Take 1 tablet (500 mg) by mouth 2 times daily as needed for moderate pain   Refills:  1        ondansetron 4 MG ODT tab   Commonly known as:  ZOFRAN ODT   Dose:  4-8 mg   Quantity:  30 tablet        Take 1-2 tablets (4-8 mg) by mouth every 6 hours as needed for nausea Take 1-2 tablets by mouth as needed for nausea   Refills:  1        ondansetron 4 MG tablet   Commonly known as:  ZOFRAN   Dose:  4 mg   Quantity:  30 tablet        Take 1 tablet (4 mg) by mouth every 8 hours as needed for nausea   Refills:  11        * order for DME    Quantity:  1 Units        Equipment being ordered: Nebulizer   Refills:  0        * order for DME   Quantity:  1 each        Equipment being ordered: Walker () Treatment Diagnosis: S/P SPINE SURGERY   Refills:  0        * order for DME   Quantity:  1 each        Equipment being ordered: Walker Wheels () and Walker () Treatment Diagnosis: difficulty walking   Refills:  0        oxyCODONE 5 MG IR tablet   Commonly known as:  ROXICODONE   Dose:  5 mg   Quantity:  20 tablet        Take 1 tablet (5 mg) by mouth every 4 hours as needed for pain maximum 4 tablet(s) per day   Refills:  0        promethazine 25 MG tablet   Commonly known as:  PHENERGAN        Take by mouth every 6 hours as needed for nausea   Refills:  0        sucralfate 1 GM tablet   Commonly known as:  CARAFATE        Take by mouth 4 times daily Reported on 2/13/2017   Refills:  0        SUMAtriptan 50 MG tablet   Commonly known as:  IMITREX   Dose:  50 mg   Quantity:  18 tablet        Take 1 tablet (50 mg) by mouth at onset of headache for migraine May repeat in 2 hours. Max 8 tablets/24 hours.   Refills:  3        vitamin D 58648 UNIT capsule   Commonly known as:  ERGOCALCIFEROL   Dose:  83988 Units   Quantity:  8 capsule        Take 1 capsule (50,000 Units) by mouth every 7 days for 8 doses   Refills:  0        Vitamin D-3 5000 UNITS Tabs        Take by mouth daily Reported on 2/13/2017   Refills:  0        * Notice:  This list has 5 medication(s) that are the same as other medications prescribed for you. Read the directions carefully, and ask your doctor or other care provider to review them with you.            Prescriptions were sent or printed at these locations (2 Prescriptions)                   [facility]   [address] [address]    Telephone:  [phone]   Fax:  [phone]   Hours:                  These medications are not ready yet because we are checking if your insurance  will help you pay for them. Call your pharmacy to confirm that your medication is ready for pickup. It may take up to 24 hours for them to receive the prescription. If the prescription is not ready within 3 business days, please contact your clinic or your provider (2 of 2)         cephALEXin (KEFLEX) 500 MG capsule               fluconazole (DIFLUCAN) 150 MG tablet                Procedures and tests performed during your visit     Basic metabolic panel    CBC with platelets differential    CRP inflammation    Peripheral IV catheter      Orders Needing Specimen Collection     None      Pending Results     No orders found from 2/26/2017 to 3/1/2017.            Pending Culture Results     No orders found from 2/26/2017 to 3/1/2017.            Thank you for choosing [facility]       Thank you for choosing [facility] for your care. Our goal is always to provide you with excellent care. Hearing back from our patients is one way we can continue to improve our services. Please take a few minutes to complete the written survey that you may receive in the mail after you visit with us. Thank you!        [employer] Information     [employer] gives you secure access to your electronic health record. If you see a primary care provider, you can also send messages to your care team and make appointments. If you have questions, please call your primary care clinic.  If you do not have a primary care provider, please call [phone] and they will assist you.        Care EveryWhere ID     This is your Care EveryWhere ID. This could be used by other organizations to access your [facility] medical records  [id number]        After Visit Summary       This is your record. Keep this with you and show to your community pharmacist(s) and doctor(s) at your next visit.

## 2017-03-01 ENCOUNTER — MYC MEDICAL ADVICE (OUTPATIENT)
Dept: FAMILY MEDICINE | Facility: OTHER | Age: 27
End: 2017-03-01

## 2017-03-01 ASSESSMENT — ENCOUNTER SYMPTOMS
WEAKNESS: 0
FEVER: 1
HEADACHES: 1
FATIGUE: 1
CHILLS: 1
NUMBNESS: 0
WOUND: 1
BACK PAIN: 1

## 2017-03-01 NOTE — ED PROVIDER NOTES
History     Chief Complaint   Patient presents with     Post-op Problem     The history is provided by the patient and medical records. A  was used.     Katy Islas is a 26 year old female who presents to the ED for evaluation of fever following spine surgery.  Patient had surgery on Thursday, 5 days ago and had been doing well until yesterday when she developed a low-grade fever.  The patient is concerned that the fever may be due to an infection of her incision as the pain is also been increasing over the last 24 hours.    I have reviewed the Medications, Allergies, Past Medical and Surgical History, and Social History in the [employer] system.    Past Medical History   Diagnosis Date     Abdominal pain 10/31- 11/4/2005     [facility] admit for Crohn's     Allergic rhinitis, cause unspecified      Allergic rhinitis     Arthritis      C. difficile diarrhea      Past, no current diarrhea.     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, without mention of intractable epilepsy      pseudoseizures diagnosed after extensive neurologic eval     Migraine 07/21/12     D/C 07/22/12-[facility]     Migraine, unspecified, without mention of intractable migraine without mention of status migrainosus      Migraine     Mild intermittent asthma      mild intermittent     Mycoplasma infection in conditions classified elsewhere and of unspecified site      Other chronic pain      Back pain for 6 years     Renal disease      Kidney stones     Unspecified hearing loss      congenital hearing loss       Past  Surgical History   Procedure Laterality Date     Hc ugi endoscopy, simple exam  7/00, 10/00     mild chronic esophagitis and duodenitis, neg H pylori     Hc eeg awake and sleep       abnormal     Hc colonoscopy thru stoma, diagnostic  10/00     normal     Hc mri brain w/o contrast  12/00     normal     Hc colonoscopy thru stoma with biopsy  10/29/2003     Impression is that of normal appearing colonoscopy, without evidence of rectal bleeding.     Hc ugi endoscopy, simple exam  01/20/2005     Esophagogastroduodenoscopy, colonoscopy with biopsies.  [facility]     Colonoscopy  7/1/2009     [facility] [place]     Hc ugi endoscopy, simple exam  7/1/2009     [facility] [place]     C removal gallbladder  8/5/2009     [facility].     Hc colonoscopy thru stoma, diagnostic  Oct 2009     Dr López- normal     Hc ugi endoscopy, simple exam  11/11/2009     attempted upper GI, pt. could not tolerate procedure:[facility]      ugi endoscopy diag w biopsy  11/11/09     Normal esophagus     Orthopedic surgery  October 19,2011     diskectomy L4-L5     Fusion spine posterior minimally invasive one level N/A 2/23/2017     Procedure: FUSION SPINE POSTERIOR MINIMALLY INVASIVE ONE LEVEL;  Surgeon: Jeniffer Eugene MD;  Location: [facility] OR       Family History   Problem Relation Age of Onset     GASTROINTESTINAL DISEASE Brother      severe Crohn's     Neurologic Disorder Brother      Seizures post head injury     Depression Brother      Substance Abuse Brother      Genitourinary Problems Father      kidney stones     DIABETES Father      HEART DISEASE Father      Open heart surgery     Breast Cancer Maternal Grandmother      CEREBROVASCULAR DISEASE Paternal Grandmother      CANCER Maternal Grandfather      Lung     Cardiovascular Paternal Grandfather      Heart Attack     Substance Abuse Mother        Social History   Substance Use Topics     Smoking status: Former  Smoker     Types: Cigarettes     Smokeless tobacco: Never Used      Comment: 3 cigarettes weekly     Alcohol use 0.6 oz/week     1 Cans of beer per week      Comment: 1-2 drinks dominique        Immunization History   Administered Date(s) Administered     DPT 1990, [birth year], 07/20/1990, 08/11/1994     Hepatitis B 07/01/2000, 10/01/2000, 01/10/2001     Human Papilloma Virus 01/07/2009, 04/28/2009, 07/10/2009     Influenza (IIV3) 11/11/1999, 01/10/2001, 11/01/2006, 11/20/2007     MMR 07/15/1990, 06/27/2001     Mantoux 03/08/2010     Meningococcal (Menactra ) 11/01/2006     OPV 1989, 1989, 07/20/1990, 08/11/1994     TD (ADULT, 7+) 06/27/2001     TDAP (ADACEL AGES 11-64) 08/16/2011          Allergies   Allergen Reactions     Ativan [Lorazepam] Hives     At the IV site     Baclofen      hives     Bees Hives, Swelling and Difficulty breathing     Caffeine      Contrast Dye      Hives,   Updated 5/10/2016 CT Contrast.     Diazepam      IV form makes her become aggressive and angry     Iodine Hives     Methocarbamol Swelling     Metoclopramide      Other reaction(s): Tremors  LW Reaction: shaking/sweating     Midazolam      Other reaction(s): Agitation     Monosodium Glutamate      Morphine Other (See Comments)     Difficulty with urination     Other (Do Not Use) Other (See Comments)     Xanaflex- pt becomes disoriented and loses bladder control     Percocet [Oxycodone-Acetaminophen] Itching     Reglan [Metoclopramide Hcl] Other (See Comments)     shaking     Soma [Carisoprodol] Visual Disturbance     Sleep walking     Tizanidine      Topamax Other (See Comments)     Topamax [Topiramate] Nausea     Tingling  GI/Vomit     Tramadol      Severe Headache, Seizure Risk     Tylenol W/Codeine [Acetaminophen-Codeine] Nausea and Itching     Tylenol 3     Valium Other (See Comments)     Becomes aggressive and angry     Versed Other (See Comments)     Zolmitriptan      Makes face feel like its twitching      Droperidol Anxiety     Flu Virus Vaccine Rash      Arm swelling       Current Outpatient Prescriptions   Medication Sig Dispense Refill     oxyCODONE (ROXICODONE) 5 MG IR tablet Take 1 tablet (5 mg) by mouth every 4 hours as needed for pain maximum 4 tablet(s) per day 20 tablet 0     cephALEXin (KEFLEX) 500 MG capsule Take 1 capsule (500 mg) by mouth 4 times daily for 7 days 28 capsule 0     fluconazole (DIFLUCAN) 150 MG tablet Take one tablet at end of antibiotics 1 tablet 0     cyclobenzaprine (FLEXERIL) 10 MG tablet Take 1 tablet (10 mg) by mouth 2 times daily as needed for muscle spasms or other (back pain) 60 tablet 3     HYDROcodone-acetaminophen (NORCO) 5-325 MG per tablet Take 1-2 tablets by mouth nightly as needed for moderate to severe pain 30 tablet 0     HYDROcodone-acetaminophen (NORCO) 5-325 MG per tablet Take 1 tablet by mouth every 4 hours as needed for pain maximum 6 tablet(s) per day 60 tablet 0     SUMAtriptan (IMITREX) 50 MG tablet Take 1 tablet (50 mg) by mouth at onset of headache for migraine May repeat in 2 hours. Max 8 tablets/24 hours. 18 tablet 3     HYDROcodone Bitartrate ER 10 MG CP12 Take 10 mg by mouth 2 times daily 60 capsule 0     fluticasone (FLONASE) 50 MCG/ACT spray Spray 2 sprays into both nostrils daily 1 Bottle 3     lidocaine (LIDODERM) 5 % Patch Apply up to 3 patches to painful area at once for up to 12 h within a 24 h period.  Remove after 12 hours. 30 patch 3     order for DME Equipment being ordered: Walker ()  Treatment Diagnosis: S/P SPINE SURGERY 1 each 0     order for DME Equipment being ordered: Walker Wheels () and Walker ()  Treatment Diagnosis: difficulty walking 1 each 0     mupirocin (BACTROBAN) 2 % nasal ointment Apply 1 g into each nare 2 times daily       dexlansoprazole (DEXILANT) 60 MG CPDR CR capsule Take 1 capsule (60 mg) by mouth daily 90 capsule 3     medical cannabis inhalation (Patient's own supply.  Not a prescription) Inhale into  the lungs 3 times daily as needed Reported on 2/13/2017       ondansetron (ZOFRAN ODT) 4 MG ODT tab Take 1-2 tablets (4-8 mg) by mouth every 6 hours as needed for nausea Take 1-2 tablets by mouth as needed for nausea 30 tablet 1     ARIPiprazole (ABILIFY) 2 MG tablet Take 1 tablet (2 mg) by mouth At Bedtime 90 tablet 3     ondansetron (ZOFRAN) 4 MG tablet Take 1 tablet (4 mg) by mouth every 8 hours as needed for nausea 30 tablet 11     DULoxetine (CYMBALTA) 60 MG EC capsule Take 1 capsule (60 mg) by mouth daily 90 capsule 3     naproxen (NAPROSYN) 500 MG tablet Take 1 tablet (500 mg) by mouth 2 times daily as needed for moderate pain 180 tablet 1     gabapentin (NEURONTIN) 300 MG capsule Take 1 capsule (300 mg) by mouth 3 times daily 270 capsule 1     fluticasone-salmeterol (ADVAIR) 100-50 MCG/DOSE diskus inhaler Inhale 1 puff into the lungs 2 times daily 1 Inhaler 5     sucralfate (CARAFATE) 1 GM tablet Take by mouth 4 times daily Reported on 2/13/2017       promethazine (PHENERGAN) 25 MG tablet Take by mouth every 6 hours as needed for nausea       EPINEPHrine (EPIPEN) 0.3 MG/0.3ML injection Inject 0.3 mLs (0.3 mg) into the muscle once as needed for anaphylaxis 0.6 mL 1     dicyclomine (BENTYL) 20 MG tablet Take 1 tablet (20 mg) by mouth 4 times daily as needed (ABDOMINAL CRAMPING) AS NEEDED FOR CRAMPING 30 tablet 0     ORDER FOR DME Equipment being ordered: Nebulizer 1 Units 0     Cholecalciferol (VITAMIN D-3) 5000 UNITS TABS Take by mouth daily Reported on 2/13/2017       Misc Natural Products (FLEX-A-MIN JOINT FLEX PO) Take by mouth daily       fexofenadine (ALLEGRA) 180 MG tablet Take 180 mg by mouth daily        Multiple Vitamin (MULTI-VITAMIN) per tablet Take 1 tablet by mouth daily.          Review of Systems   Constitutional: Positive for chills, fatigue and fever.   Musculoskeletal: Positive for back pain.   Skin: Positive for wound.   Neurological: Positive for headaches. Negative for weakness and  numbness.   All other systems reviewed and are negative.      Physical Exam   BP: 125/84  Pulse: 96  Temp: 99.1  F (37.3  C)  Resp: 20  Weight: 65.8 kg (145 lb)  SpO2: 94 %  Physical Exam   Constitutional: She appears distressed.   Cardiovascular: Normal rate, normal heart sounds and intact distal pulses.    Pulmonary/Chest: Effort normal and breath sounds normal.   Abdominal: Soft. Bowel sounds are normal.   Skin: Skin is warm. No rash noted. She is not diaphoretic. No erythema.   Examination of the incisions revealed him to be clean, dry, and intact without any erythema, induration, or discharge.   Nursing note and vitals reviewed.      ED Course     ED Course     Procedures        Results for orders placed or performed during the hospital encounter of 02/28/17 (from the past 24 hour(s))   CBC with platelets differential   Result Value Ref Range    WBC 6.3 4.0 - 11.0 10e9/L    RBC Count 3.32 (L) 3.8 - 5.2 10e12/L    Hemoglobin 11.2 (L) 11.7 - 15.7 g/dL    Hematocrit 33.1 (L) 35.0 - 47.0 %     78 - 100 fl    MCH 33.7 (H) 26.5 - 33.0 pg    MCHC 33.8 31.5 - 36.5 g/dL    RDW 12.0 10.0 - 15.0 %    Platelet Count 239 150 - 450 10e9/L    Diff Method Automated Method     % Neutrophils 60.5 %    % Lymphocytes 27.7 %    % Monocytes 10.2 %    % Eosinophils 1.4 %    % Basophils 0.2 %    % Immature Granulocytes 0.0 %    Absolute Neutrophil 3.8 1.6 - 8.3 10e9/L    Absolute Lymphocytes 1.7 0.8 - 5.3 10e9/L    Absolute Monocytes 0.6 0.0 - 1.3 10e9/L    Absolute Eosinophils 0.1 0.0 - 0.7 10e9/L    Absolute Basophils 0.0 0.0 - 0.2 10e9/L    Abs Immature Granulocytes 0.0 0 - 0.4 10e9/L   CRP inflammation   Result Value Ref Range    CRP Inflammation 10.9 (H) 0.0 - 8.0 mg/L   Basic metabolic panel   Result Value Ref Range    Sodium 142 133 - 144 mmol/L    Potassium 4.1 3.4 - 5.3 mmol/L    Chloride 105 94 - 109 mmol/L    Carbon Dioxide 31 20 - 32 mmol/L    Anion Gap 6 3 - 14 mmol/L    Glucose 115 (H) 70 - 99 mg/dL    Urea  Nitrogen 10 7 - 30 mg/dL    Creatinine 0.68 0.52 - 1.04 mg/dL    GFR Estimate >90  Non  GFR Calc   >60 mL/min/1.7m2    GFR Estimate If Black >90   GFR Calc   >60 mL/min/1.7m2    Calcium 8.8 8.5 - 10.1 mg/dL     *Note: Due to a large number of results and/or encounters for the requested time period, some results have not been displayed. A complete set of results can be found in Results Review.              Assessments & Plan (with Medical Decision Making)  Katy Islas is a 26-year-old female who presents to the ED for evaluation of fever following a lumbar fixation surgery performed 5 days earlier at the [facility].  The patient reports that over the last 24 hours she is developed fever and increasing back pain.  She is concerned that her incisions are become infected.  On examination, her incisions appear to be clean, dry, and intact without any evidence for infection.  A CBC was obtained showing normal white count.  Her C-reactive protein is only mildly elevated at 10.9 which is most likely due to her recent surgery.  Her basic metabolic panel is unremarkable.  At the patient's insistence, I am starting her on cephalexin prophylactically and have given her a single dose of Diflucan to take at the end of the antibiotics.  She is scheduled to see the surgeon later this week for reevaluation.  I do think that her fever is most likely due to atelectasis and discussed with her the use of incentive spirometry, however, the patient does not appear to believe what I have to say he would rather not do this spirometry.       I have reviewed the nursing notes.    I have reviewed the findings, diagnosis, plan and need for follow up with the patient.    Discharge Medication List as of 2/28/2017 11:40 PM      START taking these medications    Details   cephALEXin (KEFLEX) 500 MG capsule Take 1 capsule (500 mg) by mouth 4 times daily for 7 days, Disp-28 capsule, R-0, E-Prescribe       fluconazole (DIFLUCAN) 150 MG tablet Take one tablet at end of antibiotics, Disp-1 tablet, R-0, E-Prescribe             Final diagnoses:   Postoperative fever       2/28/2017   [facility] EMERGENCY DEPARTMENT     Seun Chadwick MD  03/01/17 0410

## 2017-03-02 ENCOUNTER — MYC REFILL (OUTPATIENT)
Dept: FAMILY MEDICINE | Facility: OTHER | Age: 27
End: 2017-03-02

## 2017-03-02 DIAGNOSIS — Z98.1 S/P SPINAL FUSION: ICD-10-CM

## 2017-03-02 RX ORDER — OXYCODONE HYDROCHLORIDE 5 MG/1
5 TABLET ORAL EVERY 4 HOURS PRN
Qty: 20 TABLET | Refills: 0 | Status: CANCELLED | OUTPATIENT
Start: 2017-03-02

## 2017-03-02 NOTE — PROGRESS NOTES
SUBJECTIVE:                                                    Katy Islas is a 26 year old female who presents to clinic today for the following health issues:    Depression   Asthma   Ear Problem   This is a new problem. The current episode started in the past 7 days. The problem occurs constantly. The problem has been gradually worsening. Exacerbated by: Hearing aid mold. Thinks it might have been a pimple.     Back Pain Follow Up       Description:   Location of pain:  Left, lower side   Character of pain: dull ache  Pain radiation: radiates into the left buttocks  Since last visit, pain is:  worsened  New numbness or weakness in legs, not attributed to pain:  no     Intensity: Currently 5/10 - more when moving around    History:   Pain interferes with job: Not applicable  Therapies tried without relief: Physical Therapy  Therapies tried with relief: rest and sitting        Accompanying Signs & Symptoms:  Risk of Fracture:  None  Risk of Cauda Equina:  None  Risk of Infection:  None  Risk of Cancer:  None    - More related to surgery pain and healing process  - Patient s/p fusion on 2/23/17  - Norco didn't help  - Dilaudid gave itching and rash  - Oxycodone wasn't enough - tried 4-6  - Stretches 3-4x.daily  -Surgeon follow up scheduled for ~1 month            Problem list and histories reviewed & adjusted, as indicated.  Additional history: as documented    Labs reviewed in EPIC    ROS:  Constitutional, HEENT, cardiovascular, pulmonary, gi and gu systems are negative, except as otherwise noted.    OBJECTIVE:                                                    /66 (BP Location: Right arm, Patient Position: Chair, Cuff Size: Adult Regular)  Pulse 90  Temp 97.2  F (36.2  C) (Temporal)  Wt 150 lb (68 kg)  LMP 02/02/2012  BMI 28.34 kg/m2  Body mass index is 28.34 kg/(m^2).  GENERAL APPEARANCE: healthy, alert and no distress  EYES: Eyes grossly normal to inspection, PERRLA, conjunctivae and sclerae without  injection or discharge, EOM intact   HEENT: Right ear - near anterior aspect of pauline small abrasion with scab, no signs of infection   MS: No musculoskeletal defects are noted and gait is age appropriate without ataxia   SKIN: No suspicious lesions or rashes, hydration status appears adeuqate with normal skin turgor    BACK: Deferred   PSYCH: Alert and oriented x3; speech- coherent , normal rate and volume; able to articulate logical thoughts, able to abstract reason, no tangential thoughts, no hallucinations or delusions, mentation appears normal, Mood is euthymic. Affect is appropriate for this mood state and bright. Thought content is free of suicidal ideation, hallucinations, and delusions. Dress is adequate and upkept. Eye contact is good during conversation.      Diagnostic Test Results:  none      ASSESSMENT/PLAN:                                                        ICD-10-CM    1. Chronic bilateral low back pain with left-sided sciatica M54.42 fentaNYL (DURAGESIC) 25 mcg/hr 72 hr patch    G89.29    2. Lumbar disc disease with radiculopathy M51.16 fentaNYL (DURAGESIC) 25 mcg/hr 72 hr patch   3. S/P lumbar fusion Z98.1 fentaNYL (DURAGESIC) 25 mcg/hr 72 hr patch   4. S/P spinal fusion Z98.1 oxyCODONE (ROXICODONE) 5 MG IR tablet   5. Abrasion of right ear, initial encounter S00.411A        - S/p lumbar fusion 2/23/17  - On keflex for possible incision infection   - Recommendation is to start physical therapy ~1 month post op, but please verify this with your surgeon   - Fentanyl patch, every 72 hours, discussed use and side effects, will require a taper off   - Oxycodone for break through pain, no more than 4/day  - Discussed both medication use and side effects   - Recheck every 2 weeks   - Can now leave bandage off incision, allow steri strips to fall of naturally   - recommend keep appointment with audiology for hearing aid check in a few weeks, does appear to be rubbing, may need mold adjustment     The  patient indicates understanding of these issues and agrees with the plan.    Follow up: 3/13/17 as scheduled       Aura Rosario PA-C  [facility]

## 2017-03-02 NOTE — TELEPHONE ENCOUNTER
Can see her tomorrow, several 30 min options open. Needs interpretor     Zach Rosario PA-C  [facility]

## 2017-03-02 NOTE — TELEPHONE ENCOUNTER
Message from [employer]:  Original authorizing provider: LOVE Bishop would like a refill of the following medications:  oxyCODONE (ROXICODONE) 5 MG IR tablet [Aura Rosario PA-C]    Preferred pharmacy: [facility] - [place], [place] - [address] [place] [address]    Comment:  Can  friday before go [place]

## 2017-03-02 NOTE — TELEPHONE ENCOUNTER
Please let patient know rest and ice. Continue with pain medications, can increase to 6 oxycodone per day. Will see her tomorrow to discuss other medications.   I have not heard back from her surgeon regarding physical therapy.   Can someone please contact Dr. YOVANI Millan with [facility] and find out answer to my 2 questions. I sent a staff message but no response      - Wound care, when can she leave bandage off and just allow the steri strips to fall off?      - When can she start physical therapy?     Zach Rosario PA-C  [facility]

## 2017-03-02 NOTE — TELEPHONE ENCOUNTER
[employer] sent with CDL's message, will flag for TC to contact Dr Millan.  Maya Cardenas, CMA

## 2017-03-03 ENCOUNTER — OFFICE VISIT (OUTPATIENT)
Dept: FAMILY MEDICINE | Facility: OTHER | Age: 27
End: 2017-03-03
Payer: MEDICARE

## 2017-03-03 VITALS
DIASTOLIC BLOOD PRESSURE: 66 MMHG | WEIGHT: 150 LBS | SYSTOLIC BLOOD PRESSURE: 122 MMHG | BODY MASS INDEX: 28.34 KG/M2 | TEMPERATURE: 97.2 F | HEART RATE: 90 BPM

## 2017-03-03 DIAGNOSIS — Z98.1 S/P SPINAL FUSION: ICD-10-CM

## 2017-03-03 DIAGNOSIS — Z98.1 S/P LUMBAR FUSION: ICD-10-CM

## 2017-03-03 DIAGNOSIS — G89.29 CHRONIC BILATERAL LOW BACK PAIN WITH LEFT-SIDED SCIATICA: Primary | ICD-10-CM

## 2017-03-03 DIAGNOSIS — S00.411A ABRASION OF RIGHT EAR, INITIAL ENCOUNTER: ICD-10-CM

## 2017-03-03 DIAGNOSIS — M54.42 CHRONIC BILATERAL LOW BACK PAIN WITH LEFT-SIDED SCIATICA: Primary | ICD-10-CM

## 2017-03-03 DIAGNOSIS — M51.16 LUMBAR DISC DISEASE WITH RADICULOPATHY: ICD-10-CM

## 2017-03-03 PROCEDURE — 99214 OFFICE O/P EST MOD 30 MIN: CPT | Performed by: PHYSICIAN ASSISTANT

## 2017-03-03 RX ORDER — FENTANYL 25 UG/1
1 PATCH TRANSDERMAL
Qty: 5 PATCH | Refills: 0 | Status: SHIPPED | OUTPATIENT
Start: 2017-03-03 | End: 2017-03-13 | Stop reason: DRUGHIGH

## 2017-03-03 RX ORDER — OXYCODONE HYDROCHLORIDE 5 MG/1
5 TABLET ORAL EVERY 4 HOURS PRN
Qty: 45 TABLET | Refills: 0 | Status: SHIPPED | OUTPATIENT
Start: 2017-03-03 | End: 2017-03-13 | Stop reason: DRUGHIGH

## 2017-03-03 ASSESSMENT — PAIN SCALES - GENERAL: PAINLEVEL: MODERATE PAIN (5)

## 2017-03-03 NOTE — MR AVS SNAPSHOT
After Visit Summary   3/3/2017    Katy Islas    MRN: [id number]           Patient Information     Date Of Birth          1990        Visit Information        Provider Department      3/3/2017 1:00 PM Aura Rosario PA-C; [facility]        Today's Diagnoses     Chronic bilateral low back pain with left-sided sciatica    -  1    Lumbar disc disease with radiculopathy        S/P lumbar fusion          Care Instructions    - Recommendation is to start physical therapy ~1 month post op, but please verify this with your surgeon     - Fentanyl patch, every 72 hours   - Oxycodone for break through pain, no more than 4/day    - Recheck every 2 weeks     - Can now leave bandage off incision, allow steri strips to fall of naturally               Follow-ups after your visit        Your next 10 appointments already scheduled     Mar 13, 2017  1:00 PM CDT   Office Visit with Aura Rosario PA-C, Shira Faustin   [facility]    [address] [place] [address] [id number] [phone]           Bring a current list of meds and any records pertaining to this visit.  For Physicals, please bring immunization records and any forms needing to be filled out.  Please arrive 10 minutes early to complete paperwork.            Mar 21, 2017 11:00 AM CDT   Return Visit with Natalie Zimmerman   [facility]    [address] [place] [address] [id number] [phone]              Who to contact     If you have questions or need follow up information about today's clinic visit or your schedule please contact [facility] directly at [phone].  Normal or non-critical lab and imaging results will be communicated to you by MyChart, letter or phone within 4 business days after the clinic has received the results. If you do not hear from us within 7  days, please contact the clinic through [employer] or phone. If you have a critical or abnormal lab result, we will notify you by phone as soon as possible.  Submit refill requests through [employer] or call your pharmacy and they will forward the refill request to us. Please allow 3 business days for your refill to be completed.          Additional Information About Your Visit        [email or web address] Information     [employer] gives you secure access to your electronic health record. If you see a primary care provider, you can also send messages to your care team and make appointments. If you have questions, please call your primary care clinic.  If you do not have a primary care provider, please call [phone] and they will assist you.        Care EveryWhere ID     This is your Care EveryWhere ID. This could be used by other organizations to access your [facility] medical records  [id number]        Your Vitals Were     Pulse Temperature Last Period BMI (Body Mass Index)          90 97.2  F (36.2  C) (Temporal) 02/02/2012 28.34 kg/m2         Blood Pressure from Last 3 Encounters:   03/03/17 122/66   02/28/17 (!) 129/112   02/27/17 128/82    Weight from Last 3 Encounters:   03/03/17 150 lb (68 kg)   02/28/17 145 lb (65.8 kg)   02/27/17 148 lb (67.1 kg)              Today, you had the following     No orders found for display         Today's Medication Changes          These changes are accurate as of: 3/3/17  1:12 PM.  If you have any questions, ask your nurse or doctor.               Start taking these medicines.        Dose/Directions    fentaNYL 25 mcg/hr 72 hr patch   Commonly known as:  DURAGESIC   Used for:  Chronic bilateral low back pain with left-sided sciatica, Lumbar disc disease with radiculopathy, S/P lumbar fusion        Dose:  1 patch   Place 1 patch onto the skin every 72 hours   Quantity:  5 patch   Refills:  0         Stop taking these medicines if you haven't already. Please contact your care team if you have  questions.     HYDROcodone-acetaminophen 5-325 MG per tablet   Commonly known as:  NORCO                Where to get your medicines      Some of these will need a paper prescription and others can be bought over the counter.  Ask your nurse if you have questions.     Bring a paper prescription for each of these medications     fentaNYL 25 mcg/hr 72 hr patch                Primary Care Provider Office Phone # Fax #    Aura DIAZ LOVE Rosario [phone]       [facility] [address]  [facility] [address]        Thank you!     Thank you for choosing [facility]  for your care. Our goal is always to provide you with excellent care. Hearing back from our patients is one way we can continue to improve our services. Please take a few minutes to complete the written survey that you may receive in the mail after your visit with us. Thank you!             Your Updated Medication List - Protect others around you: Learn how to safely use, store and throw away your medicines at www.disposemymeds.org.          This list is accurate as of: 3/3/17  1:12 PM.  Always use your most recent med list.                   Brand Name Dispense Instructions for use    ALLEGRA 180 MG tablet   Generic drug:  fexofenadine      Take 180 mg by mouth daily       ARIPiprazole 2 MG tablet    ABILIFY    90 tablet    Take 1 tablet (2 mg) by mouth At Bedtime       cephALEXin 500 MG capsule    KEFLEX    28 capsule    Take 1 capsule (500 mg) by mouth 4 times daily for 7 days       cyclobenzaprine 10 MG tablet    FLEXERIL    60 tablet    Take 1 tablet (10 mg) by mouth 2 times daily as needed for muscle spasms or other (back pain)       dexlansoprazole 60 MG Cpdr CR capsule    DEXILANT    90 capsule    Take 1 capsule (60 mg) by mouth daily       dicyclomine 20 MG tablet    BENTYL    30 tablet    Take 1 tablet (20 mg) by mouth 4 times daily as needed (ABDOMINAL CRAMPING) AS NEEDED FOR CRAMPING        DULoxetine 60 MG EC capsule    CYMBALTA    90 capsule    Take 1 capsule (60 mg) by mouth daily       EPINEPHrine 0.3 MG/0.3ML injection     0.6 mL    Inject 0.3 mLs (0.3 mg) into the muscle once as needed for anaphylaxis       fentaNYL 25 mcg/hr 72 hr patch    DURAGESIC    5 patch    Place 1 patch onto the skin every 72 hours       FLEX-A-MIN JOINT FLEX PO      Take by mouth daily       fluconazole 150 MG tablet    DIFLUCAN    1 tablet    Take one tablet at end of antibiotics       fluticasone 50 MCG/ACT spray    FLONASE    1 Bottle    Spray 2 sprays into both nostrils daily       fluticasone-salmeterol 100-50 MCG/DOSE diskus inhaler    ADVAIR    1 Inhaler    Inhale 1 puff into the lungs 2 times daily       gabapentin 300 MG capsule    NEURONTIN    270 capsule    Take 1 capsule (300 mg) by mouth 3 times daily       HYDROcodone Bitartrate ER 10 MG Cp12     60 capsule    Take 10 mg by mouth 2 times daily       lidocaine 5 % Patch    LIDODERM    30 patch    Apply up to 3 patches to painful area at once for up to 12 h within a 24 h period.  Remove after 12 hours.       medical cannabis inhalation (Patient's own supply.  Not a prescription)      Inhale into the lungs 3 times daily as needed Reported on 2/13/2017       Multi-vitamin Tabs tablet   Generic drug:  multivitamin, therapeutic with minerals      Take 1 tablet by mouth daily.       mupirocin 2 % nasal ointment    BACTROBAN     Apply 1 g into each nare 2 times daily       naproxen 500 MG tablet    NAPROSYN    180 tablet    Take 1 tablet (500 mg) by mouth 2 times daily as needed for moderate pain       ondansetron 4 MG ODT tab    ZOFRAN ODT    30 tablet    Take 1-2 tablets (4-8 mg) by mouth every 6 hours as needed for nausea Take 1-2 tablets by mouth as needed for nausea       ondansetron 4 MG tablet    ZOFRAN    30 tablet    Take 1 tablet (4 mg) by mouth every 8 hours as needed for nausea       * order for DME     1 Units    Equipment being ordered:  Nebulizer       * order for DME     1 each    Equipment being ordered: Walker () Treatment Diagnosis: S/P SPINE SURGERY       * order for DME     1 each    Equipment being ordered: Walker Wheels () and Walker () Treatment Diagnosis: difficulty walking       oxyCODONE 5 MG IR tablet    ROXICODONE    20 tablet    Take 1 tablet (5 mg) by mouth every 4 hours as needed for pain maximum 4 tablet(s) per day       promethazine 25 MG tablet    PHENERGAN     Take by mouth every 6 hours as needed for nausea       sucralfate 1 GM tablet    CARAFATE     Take by mouth 4 times daily Reported on 2/13/2017       SUMAtriptan 50 MG tablet    IMITREX    18 tablet    Take 1 tablet (50 mg) by mouth at onset of headache for migraine May repeat in 2 hours. Max 8 tablets/24 hours.       Vitamin D-3 5000 UNITS Tabs      Take by mouth daily Reported on 2/13/2017       * Notice:  This list has 3 medication(s) that are the same as other medications prescribed for you. Read the directions carefully, and ask your doctor or other care provider to review them with you.

## 2017-03-03 NOTE — NURSING NOTE
"Chief Complaint   Patient presents with     Recheck Medication     Ear Problem     Panel Management     lipid, DAP, PHQ9/JULIETH, ACT, yearly urine drug screen, robyn       Initial /66 (BP Location: Right arm, Patient Position: Chair, Cuff Size: Adult Regular)  Pulse 90  Temp 97.2  F (36.2  C) (Temporal)  Wt 150 lb (68 kg)  LMP 02/02/2012  BMI 28.34 kg/m2 Estimated body mass index is 28.34 kg/(m^2) as calculated from the following:    Height as of 2/27/17: 5' 1\" (1.549 m).    Weight as of this encounter: 150 lb (68 kg).  Medication Reconciliation: complete     Abimbola Castro CMA      "

## 2017-03-03 NOTE — PATIENT INSTRUCTIONS
- Recommendation is to start physical therapy ~1 month post op, but please verify this with your surgeon     - Fentanyl patch, every 72 hours   - Oxycodone for break through pain, no more than 4/day    - Recheck every 2 weeks     - Can now leave bandage off incision, allow steri strips to fall of naturally

## 2017-03-03 NOTE — TELEPHONE ENCOUNTER
Discussed with different ortho since we weren't able to contact Dr. Millan.     Incision open after a few days, let steri strips fall off  Physical therapy around ~1 month post op    Zach Tomlinson-LOVE Moss  [facility]

## 2017-03-06 ENCOUNTER — MYC MEDICAL ADVICE (OUTPATIENT)
Dept: FAMILY MEDICINE | Facility: OTHER | Age: 27
End: 2017-03-06

## 2017-03-07 NOTE — TELEPHONE ENCOUNTER
Replied via [employer].    Zach Rosario PA-C  [facility]

## 2017-03-08 NOTE — PROGRESS NOTES
"  SUBJECTIVE:                                                    Katy Islas is a 26 year old female who presents to clinic today for the following health issues:      HPI    Pt states having nose bleeds, would like CDL to check nose  - Ongoing for about a month.     - Scabbing on the inside of the nose.    - States getting bloody noses almost everyday.  Pt bought a humidifier to help.        Back Pain Follow Up       Description:   Location of pain:  left  Character of pain: sharp and nerve pain  Pain radiation: radiates into the left buttocks and radiates below the knee   Since last visit, pain is:  Improved surgery pain and worsened the nerve pain  New numbness or weakness in legs, not attributed to pain:  If sitting for too long legs feel \"sleepy\"     Intensity: Currently 4/10    History:   Pain interferes with job: Not applicable  Therapies tried without relief:   Therapies tried with relief: cold        Accompanying Signs & Symptoms:  Risk of Fracture:  None  Risk of Cauda Equina:  None  Risk of Infection:  None  Risk of Cancer:  None    - Would like to discuss changing oxycodone to extended release, upping the gabapentin dose and lowering the Fentanyl dose    - Gabapentin yesterday - 1 in AM, 1 at lunch, and 2 at bedtime, titration up   - Waking up at night in pain  - Taking oxycodone 4-6 per day          Plan from 3/3/17  - S/p lumbar fusion 2/23/17  - On keflex for possible incision infection   - Recommendation is to start physical therapy ~1 month post op, but please verify this with your surgeon   - Fentanyl patch, every 72 hours, discussed use and side effects, will require a taper off   - Oxycodone for break through pain, no more than 4/day  - Discussed both medication use and side effects   - Recheck every 2 weeks   - Can now leave bandage off incision, allow steri strips to fall of naturally         Problem list and histories reviewed & adjusted, as indicated.  Additional history: as " documented      ROS:  Constitutional, HEENT, cardiovascular, pulmonary, gi and gu systems are negative, except as otherwise noted.    OBJECTIVE:                                                    /80 (BP Location: Left arm, Patient Position: Chair, Cuff Size: Adult Regular)  Pulse 126  Temp 99.2  F (37.3  C) (Oral)  Resp 16  Wt 154 lb (69.9 kg)  LMP 02/02/2012  SpO2 97%  BMI 29.1 kg/m2  Body mass index is 29.1 kg/(m^2).  GENERAL APPEARANCE: healthy, alert and no distress  EYES: Eyes grossly normal to inspection, PERRLA, conjunctivae and sclerae without injection or discharge, EOM intact   HENT: Nasal turbinates without swelling, erythema, or discharge, slightly dry, small well healing scab on anterior portion of left septum   RESP: Lungs clear to auscultation - no rales, rhonchi or wheezes   CV: Regular rates and rhythm, normal S1 S2, no S3 or S4, no murmur, click or rub  MS: No musculoskeletal defects are noted and gait is age appropriate without ataxia   SKIN: No suspicious lesions or rashes, hydration status appears adeuqate with normal skin turgor   BACK: Deferred   PSYCH: Alert and oriented x3; speech- coherent , normal rate and volume; able to articulate logical thoughts, able to abstract reason, no tangential thoughts, no hallucinations or delusions, mentation appears normal, Mood is euthymic. Affect is appropriate for this mood state and bright. Thought content is free of suicidal ideation, hallucinations, and delusions. Dress is adequate and upkept. Eye contact is good during conversation.       Diagnostic Test Results:  none      ASSESSMENT/PLAN:                                                        ICD-10-CM    1. Chronic pain syndrome G89.4 fentaNYL (DURAGESIC) 12 mcg/hr 72 hr patch     oxyCODONE (OXYCONTIN) 20 MG 12 hr tablet   2. Chronic bilateral low back pain with left-sided sciatica M54.42 fentaNYL (DURAGESIC) 12 mcg/hr 72 hr patch    G89.29 oxyCODONE (OXYCONTIN) 20 MG 12 hr tablet   3.  Lumbar disc disease with radiculopathy M51.16 fentaNYL (DURAGESIC) 12 mcg/hr 72 hr patch     oxyCODONE (OXYCONTIN) 20 MG 12 hr tablet   4. S/P lumbar fusion Z98.1 fentaNYL (DURAGESIC) 12 mcg/hr 72 hr patch     oxyCODONE (OXYCONTIN) 20 MG 12 hr tablet   5. Epistaxis R04.0    6. Abdominal cramping R10.9    7. Hx of Crohn's disease Z87.19 dicyclomine (BENTYL) 20 MG tablet     promethazine (PHENERGAN) 25 MG tablet     - S/p lumbar fusion 2/23/17   - Start/continue physical therapy   - Fentanyl patch, every 72 hours, discussed use and side effects, will require a taper off, decrease dosing to 12 mg, reviewed use and side effects   - Oxycontin 20 mg for break through pain, no more than 2/day, may cause sedation, discussed use and side effects, if too sedating, may need to go back to percocet   - Discussed both medication use and side effects   - Recheck every 2 weeks   - Before bed - Vaseline or AYR nasal saline gel in nasal passage, place on outside and then push in to get some on septum   - Nasal saline spray or AYR nasal saline gel during the day as needed     The patient indicates understanding of these issues and agrees with the plan.    Follow up: 2 weeks       Aura Rosario PA-C  [facility]

## 2017-03-09 ENCOUNTER — MYC MEDICAL ADVICE (OUTPATIENT)
Dept: FAMILY MEDICINE | Facility: OTHER | Age: 27
End: 2017-03-09

## 2017-03-10 ENCOUNTER — MYC MEDICAL ADVICE (OUTPATIENT)
Dept: FAMILY MEDICINE | Facility: OTHER | Age: 27
End: 2017-03-10

## 2017-03-10 PROBLEM — Z78.9: Status: ACTIVE | Noted: 2017-03-10

## 2017-03-10 NOTE — TELEPHONE ENCOUNTER
Printed a copy of my previous orders for physical therapy, please fax to [facility]    Replied to patient via MyChart.    Zach Tomlinson-LOVE Moss  [facility]

## 2017-03-13 ENCOUNTER — OFFICE VISIT (OUTPATIENT)
Dept: FAMILY MEDICINE | Facility: OTHER | Age: 27
End: 2017-03-13
Payer: MEDICARE

## 2017-03-13 VITALS
TEMPERATURE: 99.2 F | DIASTOLIC BLOOD PRESSURE: 80 MMHG | WEIGHT: 154 LBS | RESPIRATION RATE: 16 BRPM | HEART RATE: 126 BPM | OXYGEN SATURATION: 97 % | SYSTOLIC BLOOD PRESSURE: 126 MMHG | BODY MASS INDEX: 29.1 KG/M2

## 2017-03-13 DIAGNOSIS — Z87.19 HX OF CROHN'S DISEASE: ICD-10-CM

## 2017-03-13 DIAGNOSIS — Z98.1 S/P LUMBAR FUSION: ICD-10-CM

## 2017-03-13 DIAGNOSIS — R10.9 ABDOMINAL CRAMPING: ICD-10-CM

## 2017-03-13 DIAGNOSIS — M51.16 LUMBAR DISC DISEASE WITH RADICULOPATHY: ICD-10-CM

## 2017-03-13 DIAGNOSIS — G89.4 CHRONIC PAIN SYNDROME: Primary | ICD-10-CM

## 2017-03-13 DIAGNOSIS — G89.29 CHRONIC BILATERAL LOW BACK PAIN WITH LEFT-SIDED SCIATICA: ICD-10-CM

## 2017-03-13 DIAGNOSIS — M54.42 CHRONIC BILATERAL LOW BACK PAIN WITH LEFT-SIDED SCIATICA: ICD-10-CM

## 2017-03-13 DIAGNOSIS — R04.0 EPISTAXIS: ICD-10-CM

## 2017-03-13 PROCEDURE — 99214 OFFICE O/P EST MOD 30 MIN: CPT | Performed by: PHYSICIAN ASSISTANT

## 2017-03-13 PROCEDURE — T1013 SIGN LANG/ORAL INTERPRETER: HCPCS | Mod: U3 | Performed by: PHYSICIAN ASSISTANT

## 2017-03-13 RX ORDER — DICYCLOMINE HCL 20 MG
20 TABLET ORAL 4 TIMES DAILY PRN
Qty: 30 TABLET | Refills: 0 | Status: SHIPPED | OUTPATIENT
Start: 2017-03-13 | End: 2017-05-08

## 2017-03-13 RX ORDER — OXYCODONE HCL 20 MG/1
20 TABLET, FILM COATED, EXTENDED RELEASE ORAL EVERY 12 HOURS
Qty: 28 TABLET | Refills: 0 | Status: SHIPPED | OUTPATIENT
Start: 2017-03-13 | End: 2017-03-14 | Stop reason: DRUGHIGH

## 2017-03-13 RX ORDER — PROMETHAZINE HYDROCHLORIDE 25 MG/1
25 TABLET ORAL EVERY 6 HOURS PRN
Qty: 60 TABLET | Refills: 3 | Status: SHIPPED | OUTPATIENT
Start: 2017-03-13 | End: 2017-06-22

## 2017-03-13 RX ORDER — FENTANYL 12.5 UG/1
1 PATCH TRANSDERMAL
Qty: 10 PATCH | Refills: 0 | Status: SHIPPED | OUTPATIENT
Start: 2017-03-13 | End: 2017-03-28

## 2017-03-13 ASSESSMENT — PAIN SCALES - GENERAL: PAINLEVEL: MODERATE PAIN (4)

## 2017-03-13 NOTE — MR AVS SNAPSHOT
After Visit Summary   3/13/2017    Katy Islas    MRN: [id number]           Patient Information     Date Of Birth          1990        Visit Information        Provider Department      3/13/2017 1:00 PM Shira Faustin; Aura Rosario, [facility]        Today's Diagnoses     Chronic pain syndrome    -  1    Chronic bilateral low back pain with left-sided sciatica        Lumbar disc disease with radiculopathy        S/P lumbar fusion        Epistaxis        Abdominal cramping        Hx of Crohn's disease          Care Instructions    - Before bed - Vaseline or AYR nasal saline gel in nasal passage, place on outside and then push in to get some on septum   - Nasal saline spray or AYR nasal saline gel during the day as needed       - Recheck 2 weeks         Follow-ups after your visit        Your next 10 appointments already scheduled     Mar 21, 2017 11:00 AM CDT   [facility] with Natalie Zimmerman Laura Wilbur   [facility]    [address] [place] [id number] [phone]              Who to contact     If you have questions or need follow up information about today's clinic visit or your schedule please contact [facility] directly at [phone].  Normal or non-critical lab and imaging results will be communicated to you by Telerivethart, letter or phone within 4 business days after the clinic has received the results. If you do not hear from us within 7 days, please contact the clinic through MyChart or phone. If you have a critical or abnormal lab result, we will notify you by phone as soon as possible.  Submit refill requests through Savedaily or call your pharmacy and they will forward the refill request to us. Please allow 3 business days for your refill to be completed.          Additional Information About Your Visit        Savedaily Information     Savedaily gives you secure  access to your electronic health record. If you see a primary care provider, you can also send messages to your care team and make appointments. If you have questions, please call your primary care clinic.  If you do not have a primary care provider, please call [phone] and they will assist you.        Care EveryWhere ID     This is your Care EveryWhere ID. This could be used by other organizations to access your [place] medical records  [id number]        Your Vitals Were     Pulse Temperature Respirations Last Period Pulse Oximetry BMI (Body Mass Index)    126 99.2  F (37.3  C) (Oral) 16 02/02/2012 97% 29.1 kg/m2       Blood Pressure from Last 3 Encounters:   03/13/17 126/80   03/03/17 122/66   02/28/17 (!) 129/112    Weight from Last 3 Encounters:   03/13/17 154 lb (69.9 kg)   03/03/17 150 lb (68 kg)   02/28/17 145 lb (65.8 kg)              We Performed the Following     DEPRESSION ACTION PLAN (DAP) Order [[id number]]          Today's Medication Changes          These changes are accurate as of: 3/13/17  1:38 PM.  If you have any questions, ask your nurse or doctor.               Start taking these medicines.        Dose/Directions    fentaNYL 12 mcg/hr 72 hr patch   Commonly known as:  DURAGESIC   Used for:  Chronic pain syndrome, Chronic bilateral low back pain with left-sided sciatica, Lumbar disc disease with radiculopathy, S/P lumbar fusion   Replaces:  fentaNYL 25 mcg/hr 72 hr patch   Started by:  Aura Rosario PA-C        Dose:  1 patch   Place 1 patch onto the skin every 72 hours   Quantity:  10 patch   Refills:  0       oxyCODONE 20 MG 12 hr tablet   Commonly known as:  OxyCONTIN   Used for:  Chronic pain syndrome, Chronic bilateral low back pain with left-sided sciatica, Lumbar disc disease with radiculopathy, S/P lumbar fusion   Replaces:  oxyCODONE 5 MG IR tablet   Started by:  Aura Rosario PA-C        Dose:  20 mg   Take 1 tablet (20 mg) by mouth every 12  hours maximum 2 tablet(s) per day   Quantity:  28 tablet   Refills:  0         These medicines have changed or have updated prescriptions.        Dose/Directions    promethazine 25 MG tablet   Commonly known as:  PHENERGAN   This may have changed:  how much to take   Used for:  Hx of Crohn's disease   Changed by:  uAra Rosario PA-C        Dose:  25 mg   Take 1 tablet (25 mg) by mouth every 6 hours as needed for nausea   Quantity:  60 tablet   Refills:  3         Stop taking these medicines if you haven't already. Please contact your care team if you have questions.     fentaNYL 25 mcg/hr 72 hr patch   Commonly known as:  DURAGESIC   Replaced by:  fentaNYL 12 mcg/hr 72 hr patch   Stopped by:  Aura Rosario PA-C           HYDROcodone Bitartrate ER 10 MG Cp12   Stopped by:  Aura Rosario PA-C           oxyCODONE 5 MG IR tablet   Commonly known as:  ROXICODONE   Replaced by:  oxyCODONE 20 MG 12 hr tablet   Stopped by:  Aura Rosario PA-C                Where to get your medicines      These medications were sent to [facility] [address]  [address] [facility] [place] [address]     Phone:  [phone]     dicyclomine 20 MG tablet    promethazine 25 MG tablet         Some of these will need a paper prescription and others can be bought over the counter.  Ask your nurse if you have questions.     Bring a paper prescription for each of these medications     fentaNYL 12 mcg/hr 72 hr patch    oxyCODONE 20 MG 12 hr tablet                Primary Care Provider Office Phone # Fax #    Aura Rosario PA-C [phone]       [facility] [address]  [facility] [address]        Thank you!     Thank you for choosing [facility]  for your care. Our goal is always to provide you with excellent care. Hearing back from our patients is one way we can continue to  improve our services. Please take a few minutes to complete the written survey that you may receive in the mail after your visit with us. Thank you!             Your Updated Medication List - Protect others around you: Learn how to safely use, store and throw away your medicines at www.disposemymeds.org.          This list is accurate as of: 3/13/17  1:38 PM.  Always use your most recent med list.                   Brand Name Dispense Instructions for use    ALLEGRA 180 MG tablet   Generic drug:  fexofenadine      Take 180 mg by mouth daily       ARIPiprazole 2 MG tablet    ABILIFY    90 tablet    Take 1 tablet (2 mg) by mouth At Bedtime       cyclobenzaprine 10 MG tablet    FLEXERIL    60 tablet    Take 1 tablet (10 mg) by mouth 2 times daily as needed for muscle spasms or other (back pain)       dexlansoprazole 60 MG Cpdr CR capsule    DEXILANT    90 capsule    Take 1 capsule (60 mg) by mouth daily       dicyclomine 20 MG tablet    BENTYL    30 tablet    Take 1 tablet (20 mg) by mouth 4 times daily as needed (ABDOMINAL CRAMPING) AS NEEDED FOR CRAMPING       DULoxetine 60 MG EC capsule    CYMBALTA    90 capsule    Take 1 capsule (60 mg) by mouth daily       EPINEPHrine 0.3 MG/0.3ML injection     0.6 mL    Inject 0.3 mLs (0.3 mg) into the muscle once as needed for anaphylaxis       fentaNYL 12 mcg/hr 72 hr patch    DURAGESIC    10 patch    Place 1 patch onto the skin every 72 hours       FLEX-A-MIN JOINT FLEX PO      Take by mouth daily       fluticasone 50 MCG/ACT spray    FLONASE    1 Bottle    Spray 2 sprays into both nostrils daily       fluticasone-salmeterol 100-50 MCG/DOSE diskus inhaler    ADVAIR    1 Inhaler    Inhale 1 puff into the lungs 2 times daily       gabapentin 300 MG capsule    NEURONTIN    270 capsule    Take 1 capsule (300 mg) by mouth 3 times daily       lidocaine 5 % Patch    LIDODERM    30 patch    Apply up to 3 patches to painful area at once for up to 12 h within a 24 h period.  Remove  after 12 hours.       medical cannabis inhalation (Patient's own supply.  Not a prescription)      Inhale into the lungs 3 times daily as needed Reported on 2/13/2017       Multi-vitamin Tabs tablet   Generic drug:  multivitamin, therapeutic with minerals      Take 1 tablet by mouth daily.       naproxen 500 MG tablet    NAPROSYN    180 tablet    Take 1 tablet (500 mg) by mouth 2 times daily as needed for moderate pain       ondansetron 4 MG ODT tab    ZOFRAN ODT    30 tablet    Take 1-2 tablets (4-8 mg) by mouth every 6 hours as needed for nausea Take 1-2 tablets by mouth as needed for nausea       ondansetron 4 MG tablet    ZOFRAN    30 tablet    Take 1 tablet (4 mg) by mouth every 8 hours as needed for nausea       * order for DME     1 Units    Equipment being ordered: Nebulizer       * order for DME     1 each    Equipment being ordered: Walker () Treatment Diagnosis: S/P SPINE SURGERY       * order for DME     1 each    Equipment being ordered: Walker Wheels () and Walker () Treatment Diagnosis: difficulty walking       oxyCODONE 20 MG 12 hr tablet    OxyCONTIN    28 tablet    Take 1 tablet (20 mg) by mouth every 12 hours maximum 2 tablet(s) per day       promethazine 25 MG tablet    PHENERGAN    60 tablet    Take 1 tablet (25 mg) by mouth every 6 hours as needed for nausea       sucralfate 1 GM tablet    CARAFATE     Take by mouth 4 times daily Reported on 2/13/2017       SUMAtriptan 50 MG tablet    IMITREX    18 tablet    Take 1 tablet (50 mg) by mouth at onset of headache for migraine May repeat in 2 hours. Max 8 tablets/24 hours.       Vitamin D-3 5000 UNITS Tabs      Take by mouth daily Reported on 2/13/2017       * Notice:  This list has 3 medication(s) that are the same as other medications prescribed for you. Read the directions carefully, and ask your doctor or other care provider to review them with you.

## 2017-03-13 NOTE — PATIENT INSTRUCTIONS
- Before bed - Vaseline or AYR nasal saline gel in nasal passage, place on outside and then push in to get some on septum   - Nasal saline spray or AYR nasal saline gel during the day as needed       - Recheck 2 weeks

## 2017-03-13 NOTE — NURSING NOTE
"Chief Complaint   Patient presents with     Back Pain     Cough     Panel Management       Initial /80 (BP Location: Left arm, Patient Position: Chair, Cuff Size: Adult Regular)  Pulse 126  Temp 99.2  F (37.3  C) (Oral)  Resp 16  Wt 154 lb (69.9 kg)  LMP 02/02/2012  SpO2 97%  BMI 29.1 kg/m2 Estimated body mass index is 29.1 kg/(m^2) as calculated from the following:    Height as of 2/27/17: 5' 1\" (1.549 m).    Weight as of this encounter: 154 lb (69.9 kg).  Medication Reconciliation: complete  "

## 2017-03-14 ENCOUNTER — MYC MEDICAL ADVICE (OUTPATIENT)
Dept: FAMILY MEDICINE | Facility: OTHER | Age: 27
End: 2017-03-14

## 2017-03-14 DIAGNOSIS — Z98.1 S/P LUMBAR FUSION: Primary | ICD-10-CM

## 2017-03-14 RX ORDER — OXYCODONE HCL 10 MG/1
10 TABLET, FILM COATED, EXTENDED RELEASE ORAL EVERY 12 HOURS
Qty: 28 TABLET | Refills: 0 | Status: SHIPPED | OUTPATIENT
Start: 2017-03-14 | End: 2017-03-28

## 2017-03-14 ASSESSMENT — ASTHMA QUESTIONNAIRES: ACT_TOTALSCORE: 23

## 2017-03-14 NOTE — TELEPHONE ENCOUNTER
Please notify patient I have signed RX for Oxycontin 10 mg  Please bring to our pharmacy for her  Thank her for message and yes I want a 2 week follow up    Zach Rosario PA-C   [facility]

## 2017-03-17 ENCOUNTER — HOSPITAL ENCOUNTER (EMERGENCY)
Facility: CLINIC | Age: 27
Discharge: HOME | End: 2017-03-17
Attending: PHYSICIAN ASSISTANT | Admitting: PHYSICIAN ASSISTANT
Payer: MEDICARE

## 2017-03-17 ENCOUNTER — MYC MEDICAL ADVICE (OUTPATIENT)
Dept: FAMILY MEDICINE | Facility: OTHER | Age: 27
End: 2017-03-17

## 2017-03-17 VITALS
DIASTOLIC BLOOD PRESSURE: 74 MMHG | OXYGEN SATURATION: 100 % | HEART RATE: 97 BPM | WEIGHT: 146 LBS | RESPIRATION RATE: 20 BRPM | BODY MASS INDEX: 27.59 KG/M2 | SYSTOLIC BLOOD PRESSURE: 120 MMHG | TEMPERATURE: 97.6 F

## 2017-03-17 DIAGNOSIS — R11.2 INTRACTABLE VOMITING WITH NAUSEA, UNSPECIFIED VOMITING TYPE: ICD-10-CM

## 2017-03-17 DIAGNOSIS — G44.219 EPISODIC TENSION-TYPE HEADACHE, NOT INTRACTABLE: ICD-10-CM

## 2017-03-17 DIAGNOSIS — R21 RASH AND NONSPECIFIC SKIN ERUPTION: ICD-10-CM

## 2017-03-17 PROCEDURE — 99285 EMERGENCY DEPT VISIT HI MDM: CPT | Mod: 25

## 2017-03-17 PROCEDURE — 99285 EMERGENCY DEPT VISIT HI MDM: CPT | Performed by: PHYSICIAN ASSISTANT

## 2017-03-17 PROCEDURE — A9270 NON-COVERED ITEM OR SERVICE: HCPCS | Mod: GY | Performed by: PHYSICIAN ASSISTANT

## 2017-03-17 PROCEDURE — 96375 TX/PRO/DX INJ NEW DRUG ADDON: CPT

## 2017-03-17 PROCEDURE — 96361 HYDRATE IV INFUSION ADD-ON: CPT

## 2017-03-17 PROCEDURE — 96374 THER/PROPH/DIAG INJ IV PUSH: CPT

## 2017-03-17 PROCEDURE — 25900017 H RX MED GY IP 259 OP 259 PS 637: Mod: GY | Performed by: PHYSICIAN ASSISTANT

## 2017-03-17 PROCEDURE — 96376 TX/PRO/DX INJ SAME DRUG ADON: CPT

## 2017-03-17 PROCEDURE — 25000128 H RX IP 250 OP 636: Performed by: PHYSICIAN ASSISTANT

## 2017-03-17 RX ORDER — OXYCODONE HYDROCHLORIDE 5 MG/1
10 TABLET ORAL ONCE
Status: DISCONTINUED | OUTPATIENT
Start: 2017-03-17 | End: 2017-03-17

## 2017-03-17 RX ORDER — MECLIZINE HCL 12.5 MG 12.5 MG/1
12.5 TABLET ORAL 4 TIMES DAILY PRN
Qty: 30 TABLET | Refills: 0 | Status: SHIPPED | OUTPATIENT
Start: 2017-03-17 | End: 2017-03-21

## 2017-03-17 RX ORDER — SODIUM CHLORIDE 9 MG/ML
1000 INJECTION, SOLUTION INTRAVENOUS CONTINUOUS
Status: DISCONTINUED | OUTPATIENT
Start: 2017-03-17 | End: 2017-03-18 | Stop reason: HOSPADM

## 2017-03-17 RX ORDER — DIPHENHYDRAMINE HYDROCHLORIDE 50 MG/ML
25 INJECTION INTRAMUSCULAR; INTRAVENOUS ONCE
Status: COMPLETED | OUTPATIENT
Start: 2017-03-17 | End: 2017-03-17

## 2017-03-17 RX ORDER — KETOROLAC TROMETHAMINE 30 MG/ML
30 INJECTION, SOLUTION INTRAMUSCULAR; INTRAVENOUS ONCE
Status: DISCONTINUED | OUTPATIENT
Start: 2017-03-17 | End: 2017-03-17

## 2017-03-17 RX ORDER — PROMETHAZINE HYDROCHLORIDE 25 MG/ML
25 INJECTION, SOLUTION INTRAMUSCULAR; INTRAVENOUS ONCE
Status: COMPLETED | OUTPATIENT
Start: 2017-03-17 | End: 2017-03-17

## 2017-03-17 RX ORDER — MECLIZINE HYDROCHLORIDE 25 MG/1
25 TABLET ORAL ONCE
Status: COMPLETED | OUTPATIENT
Start: 2017-03-17 | End: 2017-03-17

## 2017-03-17 RX ORDER — LIDOCAINE 40 MG/G
CREAM TOPICAL
Status: DISCONTINUED | OUTPATIENT
Start: 2017-03-17 | End: 2017-03-18 | Stop reason: HOSPADM

## 2017-03-17 RX ORDER — DIAZEPAM 10 MG/2ML
5 INJECTION, SOLUTION INTRAMUSCULAR; INTRAVENOUS ONCE
Status: COMPLETED | OUTPATIENT
Start: 2017-03-17 | End: 2017-03-17

## 2017-03-17 RX ORDER — ONDANSETRON 4 MG/1
4 TABLET, ORALLY DISINTEGRATING ORAL EVERY 8 HOURS PRN
Qty: 10 TABLET | Refills: 0 | Status: SHIPPED | OUTPATIENT
Start: 2017-03-17 | End: 2017-03-20

## 2017-03-17 RX ADMIN — MECLIZINE HYDROCHLORIDE 25 MG: 25 TABLET ORAL at 20:20

## 2017-03-17 RX ADMIN — HYDROMORPHONE HYDROCHLORIDE 1 MG: 1 INJECTION, SOLUTION INTRAMUSCULAR; INTRAVENOUS; SUBCUTANEOUS at 19:47

## 2017-03-17 RX ADMIN — SODIUM CHLORIDE 1000 ML: 9 INJECTION, SOLUTION INTRAVENOUS at 19:07

## 2017-03-17 RX ADMIN — DIAZEPAM 5 MG: 5 INJECTION, SOLUTION INTRAMUSCULAR; INTRAVENOUS at 19:15

## 2017-03-17 RX ADMIN — PROMETHAZINE HYDROCHLORIDE 25 MG: 25 INJECTION INTRAMUSCULAR; INTRAVENOUS at 19:12

## 2017-03-17 RX ADMIN — HYDROMORPHONE HYDROCHLORIDE 1 MG: 1 INJECTION, SOLUTION INTRAMUSCULAR; INTRAVENOUS; SUBCUTANEOUS at 20:20

## 2017-03-17 RX ADMIN — DIPHENHYDRAMINE HYDROCHLORIDE 25 MG: 50 INJECTION, SOLUTION INTRAMUSCULAR; INTRAVENOUS at 20:53

## 2017-03-17 ASSESSMENT — ENCOUNTER SYMPTOMS
NAUSEA: 1
HEADACHES: 1
PHOTOPHOBIA: 1
DIZZINESS: 1
CHILLS: 0
ABDOMINAL PAIN: 0
FEVER: 0
VOMITING: 1
BACK PAIN: 1

## 2017-03-17 NOTE — ED AVS SNAPSHOT
[facility] Emergency Department    [address] [place] [address]    Phone:  [phone]    Fax:  [phone]                                       Katy Islas   MRN: [id number]    Department:  [facility] Emergency Department   Date of Visit:  3/17/2017           After Visit Summary Signature Page     I have received my discharge instructions, and my questions have been answered. I have discussed any challenges I see with this plan with the nurse or doctor.    ..........................................................................................................................................  Patient/Patient Representative Signature      ..........................................................................................................................................  Patient Representative Print Name and Relationship to Patient    ..................................................               ................................................  Date                                            Time    ..........................................................................................................................................  Reviewed by Signature/Title    ...................................................              ..............................................  Date                                                            Time

## 2017-03-17 NOTE — ED NOTES
Patient woke with a headache at about 0830. She states she has been vomiting hourly and is light sensitive. Dizzy at times.

## 2017-03-17 NOTE — ED PROVIDER NOTES
History     Chief Complaint   Patient presents with     Headache     HPI  Katy Islas is a 26 year old female who for the emergency department complaining of a headache.  This began this morning and is associated with photophobia, nausea with vomiting, and occasional dizziness.  She describes it as all over her entire head and into her neck.  It is a dull constant pain rated 7/10.  She denies visual changes, new numbness/tingling or weakness in her extremities, fever or chills.  She has tried Imitrex at home without relief.  She had back surgery 3 weeks ago.    I have reviewed the Medications, Allergies, Past Medical and Surgical History, and Social History in the Epic system.    Review of Systems   Constitutional: Negative for chills and fever.   Eyes: Positive for photophobia. Negative for visual disturbance.   Gastrointestinal: Positive for nausea and vomiting. Negative for abdominal pain.   Musculoskeletal: Positive for back pain (chronic, recovering from lumbar fusion surgery).   Neurological: Positive for dizziness and headaches.   All other systems reviewed and are negative.      Physical Exam   BP: (!) 135/101  Pulse: 101  Temp: 97.6  F (36.4  C)  Resp: 20  Weight: 66.2 kg (146 lb)  SpO2: 100 %  Physical Exam   Constitutional: She is oriented to person, place, and time. She appears well-developed and well-nourished. No distress.   HENT:   Head: Normocephalic and atraumatic.   Right Ear: Tympanic membrane normal.   Left Ear: Tympanic membrane normal.   Nose: Nose normal.   Mouth/Throat: Uvula is midline, oropharynx is clear and moist and mucous membranes are normal.   Eyes: Conjunctivae and EOM are normal. Pupils are equal, round, and reactive to light. No scleral icterus.   Neck: Normal range of motion.   Cardiovascular: Normal rate, regular rhythm and normal heart sounds.    Pulmonary/Chest: Effort normal and breath sounds normal. No respiratory distress.   Neurological: She is alert and oriented to  person, place, and time. No cranial nerve deficit.   Skin: Skin is warm and dry. She is not diaphoretic.   Poorly demarcated faint erythema on the patient's inner legs she describes as feeling like a sunburn   Psychiatric: She has a normal mood and affect.   Patient deaf, uses ASL to communicate. Mother translating, patient refused interpretor.    Nursing note and vitals reviewed.      ED Course     ED Course     Procedures  None     Medications   0.9% sodium chloride BOLUS (0 mLs Intravenous Stopped 3/17/17 2015)   promethazine (PHENERGAN) IV injection 25 mg (25 mg Intravenous Given 3/17/17 1912)   diazepam (VALIUM) injection 5 mg (5 mg Intravenous Given 3/17/17 1915)   HYDROmorphone (DILAUDID) injection 1 mg (1 mg Intravenous Given 3/17/17 1947)   meclizine (ANTIVERT) tablet 25 mg (25 mg Oral Given 3/17/17 2020)   HYDROmorphone (DILAUDID) injection 1 mg (1 mg Intravenous Given 3/17/17 2020)   diphenhydrAMINE (BENADRYL) injection 25 mg (25 mg Intravenous Given 3/17/17 2053)        Assessments & Plan (with Medical Decision Making)  Kayt Islas is a 26 year old female who presented to the emergency department complaining of headache with associated dizziness, nausea and vomiting.  This began this morning and was not improved with Imitrex at home.  It is typical of her usual migraines.  Vital signs here within normal limits. Neurological exam was completely normal today.  Patient requested Phenergan and Valium as this has helped her nausea and dizziness in the past so this was given along with IV fluids with excellent results regarding nausea, however headache continued to bother her.  She refused IV Toradol because of her recent back surgery she was told she should not take this for a year. 1 mg IV Dilaudid given which improved headache, though still persistent. Also complained of dizziness again so she was given Antivert and Benadryl, along with a 2nd dose of Dilaudid for headache.  Afterwards she was sitting  up in bed and reported feeling significantly improved and ready to go home.  She did note incidentally a rash that started yesterday on her legs.  She had very faint poorly demarcated erythema to bilateral inner legs from groin to ankles without swelling. Minimally warm and nontender to touch.  Dr. Swanson also looked at this rash and we do not think it is infectious.  It did improve when she was administered Benadryl, so I advised she continue using this as needed at home and see how this rash progresses.  She was provided instructions on when to return to the emergency department.  She was otherwise given prescriptions of Antivert and Zofran to manage recurrent dizziness or nausea at home and I advised using Imitrex or tylenol for recurrent headache.  She can follow up as needed next week with her primary care provider.  Otherwise all her questions were answered and she was very agreeable to this plan and to discharge at this time.        I have reviewed the nursing notes.    I have reviewed the findings, diagnosis, plan and need for follow up with the patient.    Discharge Medication List as of 3/17/2017  9:54 PM      START taking these medications    Details   meclizine (ANTIVERT) 12.5 MG tablet Take 1 tablet (12.5 mg) by mouth 4 times daily as needed for dizziness, Disp-30 tablet, R-0, E-Prescribe      !! ondansetron (ZOFRAN ODT) 4 MG ODT tab Take 1 tablet (4 mg) by mouth every 8 hours as needed for nausea, Disp-10 tablet, R-0, E-Prescribe       !! - Potential duplicate medications found. Please discuss with provider.          Final diagnoses:   Episodic tension-type headache, not intractable   Intractable vomiting with nausea, unspecified vomiting type   Rash and nonspecific skin eruption       3/17/2017   [facility] EMERGENCY DEPARTMENT     Marian Barton PA-C  03/18/17 0100       Marian Barton PA-C  03/18/17 0101

## 2017-03-17 NOTE — ED AVS SNAPSHOT
[facility] Emergency Department    [address] [place] [address]    Phone:  [phone]    Fax:  [phone]                                       Katy Islas   MRN: [id number]    Department:  [facility] Emergency Department   Date of Visit:  3/17/2017           Patient Information     Date Of Birth          1990        Your diagnoses for this visit were:     Episodic tension-type headache, not intractable     Intractable vomiting with nausea, unspecified vomiting type     Rash and nonspecific skin eruption        You were seen by Marian Barton PA-C.      Follow-up Information     Follow up with Aura Rosario PA-C In 1 week.    Specialty:  Physician Assistant - Medical    Why:  As needed    Contact information:    [address]  [facility] [address]  [phone]          Follow up with [facility] Emergency Department.    Specialty:  EMERGENCY MEDICINE    Why:  If symptoms worsen    Contact information:    [address]   [place] [address] [phone]    Additional information:    From [address]: Exit at [address] on south side of [place]. Turn right on [address]. Turn left at stoplight on [address]. [facility] will be in view two blocks ahead        Discharge Instructions       Take Tylenol or Imitrex if your headache returns. You can use the Antivert prescribed for dizziness and Zofran for nausea.  Regarding your leg rash, try taking Benadryl at home every 6 hours as needed.  If this significantly worsens or changes, return to the emergency department.    Thank you for choosing [facility]'s Emergency Department. It was a pleasure taking care of you today. If you have any questions, please call [phone].    Marian Barton PA-C    * HEADACHE [unspecified]    The cause of your headache today is not clear, but it does not appear to be the sign of any  serious illness.  Under stress, some people tense the muscles of their shoulder, neck and scalp without knowing it. If this condition lasts long enough, a TENSION HEADACHE can occur.  A MIGRAINE HEADACHE is caused by changes in blood flow to the brain. It can be mild or severe. A migraine attack may be triggered by emotional stress, hormone changes during the menstrual cycle, oral contraceptives, alcohol use, certain foods containing tyramine, eye strain, weather changes, missing meals, lack of sleep or oversleeping.  Other causes of headache include a viral illness, sinus, ear or throat infection, dental pain and TMJ (jaw joint) pain.  HOME CARE:    If you were given pain medicine for this headache, do not drive yourself home. Arrange for a ride, instead. When you get home, try to sleep. You should feel much better when you wake up.    If you are having nausea or vomiting, follow a light diet until your headache is relieved.    If you have a migraine type headache, use sunglasses when in the daylight or around bright indoor lighting until symptoms improve. Bright glaring light can worsen this kind of headache.  FOLLOW UP with your doctor if the headache is not better within the next 24 hours. If you have frequent headaches you should discuss a treatment plan with your primary care doctor. By being aware of the earliest signs of headache, and starting treatment right away, you may be able to stop the pain yourself.  GET PROMPT MEDICAL ATTENTION if any of the following occur:    Worsening of your head pain or no improvement within 24 hours    Repeated vomiting (unable to keep liquids down)    Fever over 101 F (38.3 C)    Stiff neck    Extreme drowsiness, confusion or fainting    Weakness of an arm or leg or one side of the face    Difficulty with speech or vision      Future Appointments        Provider Department Dept Phone Center    3/21/2017 11:00 AM Dayana Piper, [facility]  [phone] [facility]      24 Hour Appointment Hotline       To make an appointment at any [facility], call [phone] ([phone]). If you don't have a family doctor or clinic, we will help you find one. [place] clinics are conveniently located to serve the needs of you and your family.             Review of your medicines      START taking        Dose / Directions Last dose taken    meclizine 12.5 MG tablet   Commonly known as:  ANTIVERT   Dose:  12.5 mg   Quantity:  30 tablet        Take 1 tablet (12.5 mg) by mouth 4 times daily as needed for dizziness   Refills:  0          CONTINUE these medicines which may have CHANGED, or have new prescriptions. If we are uncertain of the size of tablets/capsules you have at home, strength may be listed as something that might have changed.        Dose / Directions Last dose taken    * ondansetron 4 MG ODT tab   Commonly known as:  ZOFRAN ODT   Dose:  4-8 mg   What changed:  Another medication with the same name was added. Make sure you understand how and when to take each.   Quantity:  30 tablet        Take 1-2 tablets (4-8 mg) by mouth every 6 hours as needed for nausea Take 1-2 tablets by mouth as needed for nausea   Refills:  1        * ondansetron 4 MG ODT tab   Commonly known as:  ZOFRAN ODT   Dose:  4 mg   What changed:  You were already taking a medication with the same name, and this prescription was added. Make sure you understand how and when to take each.   Quantity:  10 tablet        Take 1 tablet (4 mg) by mouth every 8 hours as needed for nausea   Refills:  0        * Notice:  This list has 2 medication(s) that are the same as other medications prescribed for you. Read the directions carefully, and ask your doctor or other care provider to review them with you.      Our records show that you are taking the medicines listed below. If these are incorrect, please call your family doctor or clinic.        Dose / Directions Last dose taken     ALLEGRA 180 MG tablet   Dose:  180 mg   Generic drug:  fexofenadine        Take 180 mg by mouth daily   Refills:  0        ARIPiprazole 2 MG tablet   Commonly known as:  ABILIFY   Dose:  2 mg   Quantity:  90 tablet        Take 1 tablet (2 mg) by mouth At Bedtime   Refills:  3        cyclobenzaprine 10 MG tablet   Commonly known as:  FLEXERIL   Dose:  10 mg   Quantity:  60 tablet        Take 1 tablet (10 mg) by mouth 2 times daily as needed for muscle spasms or other (back pain)   Refills:  3        dexlansoprazole 60 MG Cpdr CR capsule   Commonly known as:  DEXILANT   Dose:  60 mg   Quantity:  90 capsule        Take 1 capsule (60 mg) by mouth daily   Refills:  3        dicyclomine 20 MG tablet   Commonly known as:  BENTYL   Dose:  20 mg   Quantity:  30 tablet        Take 1 tablet (20 mg) by mouth 4 times daily as needed (ABDOMINAL CRAMPING) AS NEEDED FOR CRAMPING   Refills:  0        DULoxetine 60 MG EC capsule   Commonly known as:  CYMBALTA   Dose:  60 mg   Quantity:  90 capsule        Take 1 capsule (60 mg) by mouth daily   Refills:  3        EPINEPHrine 0.3 MG/0.3ML injection   Dose:  0.3 mg   Quantity:  0.6 mL        Inject 0.3 mLs (0.3 mg) into the muscle once as needed for anaphylaxis   Refills:  1        fentaNYL 12 mcg/hr 72 hr patch   Commonly known as:  DURAGESIC   Dose:  1 patch   Quantity:  10 patch        Place 1 patch onto the skin every 72 hours   Refills:  0        FLEX-A-MIN JOINT FLEX PO        Take by mouth daily   Refills:  0        fluticasone 50 MCG/ACT spray   Commonly known as:  FLONASE   Dose:  2 spray   Quantity:  1 Bottle        Spray 2 sprays into both nostrils daily   Refills:  3        fluticasone-salmeterol 100-50 MCG/DOSE diskus inhaler   Commonly known as:  ADVAIR   Dose:  1 puff   Quantity:  1 Inhaler        Inhale 1 puff into the lungs 2 times daily   Refills:  5        gabapentin 300 MG capsule   Commonly known as:  NEURONTIN   Dose:  300 mg   Quantity:  270 capsule        Take 1  capsule (300 mg) by mouth 3 times daily   Refills:  1        lidocaine 5 % Patch   Commonly known as:  LIDODERM   Quantity:  30 patch        Apply up to 3 patches to painful area at once for up to 12 h within a 24 h period.  Remove after 12 hours.   Refills:  3        medical cannabis inhalation (Patient's own supply.  Not a prescription)        Inhale into the lungs 3 times daily as needed Reported on 2/13/2017   Refills:  0        Multi-vitamin Tabs tablet   Dose:  1 tablet   Generic drug:  multivitamin, therapeutic with minerals        Take 1 tablet by mouth daily.   Refills:  0        naproxen 500 MG tablet   Commonly known as:  NAPROSYN   Dose:  500 mg   Quantity:  180 tablet        Take 1 tablet (500 mg) by mouth 2 times daily as needed for moderate pain   Refills:  1        ondansetron 4 MG tablet   Commonly known as:  ZOFRAN   Dose:  4 mg   Quantity:  30 tablet        Take 1 tablet (4 mg) by mouth every 8 hours as needed for nausea   Refills:  11        * order for DME   Quantity:  1 Units        Equipment being ordered: Nebulizer   Refills:  0        * order for DME   Quantity:  1 each        Equipment being ordered: Walker () Treatment Diagnosis: S/P SPINE SURGERY   Refills:  0        * order for DME   Quantity:  1 each        Equipment being ordered: Walker Wheels () and Walker () Treatment Diagnosis: difficulty walking   Refills:  0        oxyCODONE 10 MG 12 hr tablet   Commonly known as:  OxyCONTIN   Dose:  10 mg   Quantity:  28 tablet        Take 1 tablet (10 mg) by mouth every 12 hours maximum 2 tablet(s) per day   Refills:  0        promethazine 25 MG tablet   Commonly known as:  PHENERGAN   Dose:  25 mg   Quantity:  60 tablet        Take 1 tablet (25 mg) by mouth every 6 hours as needed for nausea   Refills:  3        sucralfate 1 GM tablet   Commonly known as:  CARAFATE        Take by mouth 4 times daily Reported on 2/13/2017   Refills:  0        SUMAtriptan 50 MG tablet   Commonly  known as:  IMITREX   Dose:  50 mg   Quantity:  18 tablet        Take 1 tablet (50 mg) by mouth at onset of headache for migraine May repeat in 2 hours. Max 8 tablets/24 hours.   Refills:  3        Vitamin D-3 5000 UNITS Tabs        Take by mouth daily Reported on 2/13/2017   Refills:  0        * Notice:  This list has 3 medication(s) that are the same as other medications prescribed for you. Read the directions carefully, and ask your doctor or other care provider to review them with you.            Prescriptions were sent or printed at these locations (2 Prescriptions)                   [facility]   [address] [address]    Telephone:  [phone]   Fax:  [phone]   Hours:                  These medications are not ready yet because we are checking if your insurance will help you pay for them. Call your pharmacy to confirm that your medication is ready for pickup. It may take up to 24 hours for them to receive the prescription. If the prescription is not ready within 3 business days, please contact your clinic or your provider (2 of 2)         meclizine (ANTIVERT) 12.5 MG tablet               ondansetron (ZOFRAN ODT) 4 MG ODT tab                Procedures and tests performed during your visit     Peripheral IV catheter      Orders Needing Specimen Collection     None      Pending Results     No orders found from 3/15/2017 to 3/18/2017.            Pending Culture Results     No orders found from 3/15/2017 to 3/18/2017.            Thank you for choosing [facility]       Thank you for choosing [facility] for your care. Our goal is always to provide you with excellent care. Hearing back from our patients is one way we can continue to improve our services. Please take a few minutes to complete the written survey that you may receive in the mail after you visit with us. Thank you!        [employer] Information     [employer] gives you secure access to your electronic health  record. If you see a primary care provider, you can also send messages to your care team and make appointments. If you have questions, please call your primary care clinic.  If you do not have a primary care provider, please call [phone] and they will assist you.        Care EveryWhere ID     This is your Care EveryWhere ID. This could be used by other organizations to access your [facility] medical records  [id number]        After Visit Summary       This is your record. Keep this with you and show to your community pharmacist(s) and doctor(s) at your next visit.

## 2017-03-18 NOTE — DISCHARGE INSTRUCTIONS
Take Tylenol or Imitrex if your headache returns. You can use the Antivert prescribed for dizziness and Zofran for nausea.  Regarding your leg rash, try taking Benadryl at home every 6 hours as needed.  If this significantly worsens or changes, return to the emergency department.    Thank you for choosing [facility] Emergency Department. It was a pleasure taking care of you today. If you have any questions, please call [phone].    Marian Barton PA-C    * HEADACHE [unspecified]    The cause of your headache today is not clear, but it does not appear to be the sign of any serious illness.  Under stress, some people tense the muscles of their shoulder, neck and scalp without knowing it. If this condition lasts long enough, a TENSION HEADACHE can occur.  A MIGRAINE HEADACHE is caused by changes in blood flow to the brain. It can be mild or severe. A migraine attack may be triggered by emotional stress, hormone changes during the menstrual cycle, oral contraceptives, alcohol use, certain foods containing tyramine, eye strain, weather changes, missing meals, lack of sleep or oversleeping.  Other causes of headache include a viral illness, sinus, ear or throat infection, dental pain and TMJ (jaw joint) pain.  HOME CARE:    If you were given pain medicine for this headache, do not drive yourself home. Arrange for a ride, instead. When you get home, try to sleep. You should feel much better when you wake up.    If you are having nausea or vomiting, follow a light diet until your headache is relieved.    If you have a migraine type headache, use sunglasses when in the daylight or around bright indoor lighting until symptoms improve. Bright glaring light can worsen this kind of headache.  FOLLOW UP with your doctor if the headache is not better within the next 24 hours. If you have frequent headaches you should discuss a treatment plan with your primary care doctor. By being aware of the earliest signs of  headache, and starting treatment right away, you may be able to stop the pain yourself.  GET PROMPT MEDICAL ATTENTION if any of the following occur:    Worsening of your head pain or no improvement within 24 hours    Repeated vomiting (unable to keep liquids down)    Fever over 101 F (38.3 C)    Stiff neck    Extreme drowsiness, confusion or fainting    Weakness of an arm or leg or one side of the face    Difficulty with speech or vision

## 2017-03-18 NOTE — ED NOTES
"Patient requesting Benadryl d/t feeling itchy after IV Dilaudid. Went to give to patient and she stated she has \"red, burning area on both legs\". She states this actually started yesterday at home. EMILY Fonseca updated. Benadryl given, will monitor.   "

## 2017-03-20 ENCOUNTER — MYC MEDICAL ADVICE (OUTPATIENT)
Dept: FAMILY MEDICINE | Facility: OTHER | Age: 27
End: 2017-03-20

## 2017-03-21 ENCOUNTER — OFFICE VISIT (OUTPATIENT)
Dept: FAMILY MEDICINE | Facility: OTHER | Age: 27
End: 2017-03-21
Payer: MEDICARE

## 2017-03-21 ENCOUNTER — OFFICE VISIT (OUTPATIENT)
Dept: AUDIOLOGY | Facility: OTHER | Age: 27
End: 2017-03-21
Payer: MEDICARE

## 2017-03-21 VITALS
SYSTOLIC BLOOD PRESSURE: 114 MMHG | OXYGEN SATURATION: 100 % | DIASTOLIC BLOOD PRESSURE: 80 MMHG | TEMPERATURE: 98.6 F | HEART RATE: 96 BPM | WEIGHT: 151.6 LBS | BODY MASS INDEX: 28.64 KG/M2 | RESPIRATION RATE: 8 BRPM

## 2017-03-21 DIAGNOSIS — H90.3 SENSORINEURAL HEARING LOSS, BILATERAL: Primary | ICD-10-CM

## 2017-03-21 DIAGNOSIS — G89.4 CHRONIC PAIN SYNDROME: ICD-10-CM

## 2017-03-21 DIAGNOSIS — R11.0 NAUSEA: ICD-10-CM

## 2017-03-21 DIAGNOSIS — G89.29 CHRONIC BILATERAL LOW BACK PAIN WITH LEFT-SIDED SCIATICA: Primary | ICD-10-CM

## 2017-03-21 DIAGNOSIS — M54.42 CHRONIC BILATERAL LOW BACK PAIN WITH LEFT-SIDED SCIATICA: Primary | ICD-10-CM

## 2017-03-21 DIAGNOSIS — R42 DIZZINESS: ICD-10-CM

## 2017-03-21 DIAGNOSIS — G43.109 MIGRAINE WITH AURA AND WITHOUT STATUS MIGRAINOSUS, NOT INTRACTABLE: ICD-10-CM

## 2017-03-21 PROCEDURE — 99214 OFFICE O/P EST MOD 30 MIN: CPT | Performed by: NURSE PRACTITIONER

## 2017-03-21 PROCEDURE — 99207 ZZC NO CHARGE LOS: CPT | Performed by: AUDIOLOGIST

## 2017-03-21 PROCEDURE — V5266 BATTERY FOR HEARING DEVICE: HCPCS | Performed by: AUDIOLOGIST

## 2017-03-21 PROCEDURE — T1013 SIGN LANG/ORAL INTERPRETER: HCPCS | Mod: U3 | Performed by: NURSE PRACTITIONER

## 2017-03-21 RX ORDER — MECLIZINE HCL 12.5 MG 12.5 MG/1
12.5 TABLET ORAL 4 TIMES DAILY PRN
Qty: 30 TABLET | Refills: 0 | Status: SHIPPED | OUTPATIENT
Start: 2017-03-21 | End: 2017-08-28

## 2017-03-21 RX ORDER — ONDANSETRON 4 MG/1
4-8 TABLET, ORALLY DISINTEGRATING ORAL EVERY 6 HOURS PRN
Qty: 30 TABLET | Refills: 0 | Status: SHIPPED | OUTPATIENT
Start: 2017-03-21 | End: 2017-05-22

## 2017-03-21 ASSESSMENT — PAIN SCALES - GENERAL: PAINLEVEL: MILD PAIN (3)

## 2017-03-21 NOTE — MR AVS SNAPSHOT
After Visit Summary   3/21/2017    Katy Islas    MRN: [id number]           Patient Information     Date Of Birth          1990        Visit Information        Provider Department      3/21/2017 9:20 AM Rizwana Roldan Kelly Marie, APRN CNP [facility]        Today's Diagnoses     Chronic bilateral low back pain with left-sided sciatica    -  1    Chronic pain syndrome        Nausea        Dizziness        Chronic migraine without aura without status migrainosus, not intractable          Care Instructions    - Recommend continuing patch until you are able to visit with your PCP next Tuesday at 11:45 at the [facility].   - If you change your mind on wanting break through medication please let me know, we will need to do a urine drug prior to any prescriptions given it is too early to prescribe.   - If you have any worsening symptoms that are not managed by the Antivert and Zofran please contact the clinic or go into the Emergency Room.   - Please feel free to contact me at the clinic if you have any questions or concerns.     NATA Retana CNP          Follow-ups after your visit        Follow-up notes from your care team     Return in about 1 week (around 3/28/2017), or if symptoms worsen or fail to improve, for Routine Visit.      Your next 10 appointments already scheduled     Mar 21, 2017 11:00 AM CDT   [facility] Natalie Zimmerman Laura Wilbur   [facility]    [address] [place] [address]   [phone]            Mar 28, 2017 11:45 AM CDT   Office Visit with Aura Rosario PA-C   [facility]    [address] [place] [address]   [phone]           Bring a current list of meds and any records pertaining to this visit.  For Physicals, please bring immunization records and any forms needing to be filled  out.  Please arrive 10 minutes early to complete paperwork.              Who to contact     If you have questions or need follow up information about today's clinic visit or your schedule please contact [facility] directly at [phone].  Normal or non-critical lab and imaging results will be communicated to you by MyChart, letter or phone within 4 business days after the clinic has received the results. If you do not hear from us within 7 days, please contact the clinic through [employer] or phone. If you have a critical or abnormal lab result, we will notify you by phone as soon as possible.  Submit refill requests through [employer] or call your pharmacy and they will forward the refill request to us. Please allow 3 business days for your refill to be completed.          Additional Information About Your Visit        [employer] Information     [employer] gives you secure access to your electronic health record. If you see a primary care provider, you can also send messages to your care team and make appointments. If you have questions, please call your primary care clinic.  If you do not have a primary care provider, please call [phone] and they will assist you.        Care EveryWhere ID     This is your Care EveryWhere ID. This could be used by other organizations to access your [facility] medical records  [id number]        Your Vitals Were     Pulse Temperature Respirations Last Period Pulse Oximetry BMI (Body Mass Index)    127 98.6  F (37  C) (Temporal) 8 02/02/2012 100% 28.64 kg/m2       Blood Pressure from Last 3 Encounters:   03/21/17 114/80   03/17/17 120/74   03/13/17 126/80    Weight from Last 3 Encounters:   03/21/17 151 lb 9.6 oz (68.8 kg)   03/17/17 146 lb (66.2 kg)   03/13/17 154 lb (69.9 kg)              We Performed the Following     Drug abuse screen, urine (Pain Care Package)          Where to get your medicines      These medications were sent to [facility]  [place]  [place], [place] - [address]  [address] [address]     Phone:  [phone]     meclizine 12.5 MG tablet    ondansetron 4 MG ODT tab          Primary Care Provider Office Phone # Fax #    Aura EMILY Rosario PA-C [phone]       [facility] [address]  [facility] [address]        Thank you!     Thank you for choosing [facility]  for your care. Our goal is always to provide you with excellent care. Hearing back from our patients is one way we can continue to improve our services. Please take a few minutes to complete the written survey that you may receive in the mail after your visit with us. Thank you!             Your Updated Medication List - Protect others around you: Learn how to safely use, store and throw away your medicines at www.disposemymeds.org.          This list is accurate as of: 3/21/17 10:55 AM.  Always use your most recent med list.                   Brand Name Dispense Instructions for use    ALLEGRA 180 MG tablet   Generic drug:  fexofenadine      Take 180 mg by mouth daily       ARIPiprazole 2 MG tablet    ABILIFY    90 tablet    Take 1 tablet (2 mg) by mouth At Bedtime       cyclobenzaprine 10 MG tablet    FLEXERIL    60 tablet    Take 1 tablet (10 mg) by mouth 2 times daily as needed for muscle spasms or other (back pain)       dexlansoprazole 60 MG Cpdr CR capsule    DEXILANT    90 capsule    Take 1 capsule (60 mg) by mouth daily       dicyclomine 20 MG tablet    BENTYL    30 tablet    Take 1 tablet (20 mg) by mouth 4 times daily as needed (ABDOMINAL CRAMPING) AS NEEDED FOR CRAMPING       DULoxetine 60 MG EC capsule    CYMBALTA    90 capsule    Take 1 capsule (60 mg) by mouth daily       EPINEPHrine 0.3 MG/0.3ML injection     0.6 mL    Inject 0.3 mLs (0.3 mg) into the muscle once as needed for anaphylaxis       fentaNYL 12 mcg/hr 72 hr patch    DURAGESIC    10 patch    Place 1 patch onto the skin every 72 hours        FLEX-A-MIN JOINT FLEX PO      Take by mouth daily       fluticasone 50 MCG/ACT spray    FLONASE    1 Bottle    Spray 2 sprays into both nostrils daily       fluticasone-salmeterol 100-50 MCG/DOSE diskus inhaler    ADVAIR    1 Inhaler    Inhale 1 puff into the lungs 2 times daily       gabapentin 300 MG capsule    NEURONTIN    270 capsule    Take 1 capsule (300 mg) by mouth 3 times daily       lidocaine 5 % Patch    LIDODERM    30 patch    Apply up to 3 patches to painful area at once for up to 12 h within a 24 h period.  Remove after 12 hours.       meclizine 12.5 MG tablet    ANTIVERT    30 tablet    Take 1 tablet (12.5 mg) by mouth 4 times daily as needed for dizziness       medical cannabis inhalation (Patient's own supply.  Not a prescription)      Inhale into the lungs 3 times daily as needed Reported on 2/13/2017       Multi-vitamin Tabs tablet   Generic drug:  multivitamin, therapeutic with minerals      Take 1 tablet by mouth daily.       naproxen 500 MG tablet    NAPROSYN    180 tablet    Take 1 tablet (500 mg) by mouth 2 times daily as needed for moderate pain       ondansetron 4 MG ODT tab    ZOFRAN ODT    30 tablet    Take 1-2 tablets (4-8 mg) by mouth every 6 hours as needed for nausea Take 1-2 tablets by mouth as needed for nausea       ondansetron 4 MG tablet    ZOFRAN    30 tablet    Take 1 tablet (4 mg) by mouth every 8 hours as needed for nausea       * order for DME     1 Units    Equipment being ordered: Nebulizer       * order for DME     1 each    Equipment being ordered: Walker () Treatment Diagnosis: S/P SPINE SURGERY       * order for DME     1 each    Equipment being ordered: Walker Wheels () and Walker () Treatment Diagnosis: difficulty walking       oxyCODONE 10 MG 12 hr tablet    OxyCONTIN    28 tablet    Take 1 tablet (10 mg) by mouth every 12 hours maximum 2 tablet(s) per day       promethazine 25 MG tablet    PHENERGAN    60 tablet    Take 1 tablet (25 mg) by mouth  every 6 hours as needed for nausea       sucralfate 1 GM tablet    CARAFATE     Take by mouth 4 times daily Reported on 2/13/2017       SUMAtriptan 50 MG tablet    IMITREX    18 tablet    Take 1 tablet (50 mg) by mouth at onset of headache for migraine May repeat in 2 hours. Max 8 tablets/24 hours.       Vitamin D-3 5000 UNITS Tabs      Take by mouth daily Reported on 2/13/2017       * Notice:  This list has 3 medication(s) that are the same as other medications prescribed for you. Read the directions carefully, and ask your doctor or other care provider to review them with you.

## 2017-03-21 NOTE — NURSING NOTE
"Chief Complaint   Patient presents with     Hospital F/U       Initial /80  Pulse 127  Temp 98.6  F (37  C) (Temporal)  Resp 8  Wt 151 lb 9.6 oz (68.8 kg)  LMP 02/02/2012  SpO2 100%  BMI 28.64 kg/m2 Estimated body mass index is 28.64 kg/(m^2) as calculated from the following:    Height as of 2/27/17: 5' 1\" (1.549 m).    Weight as of this encounter: 151 lb 9.6 oz (68.8 kg).  Medication Reconciliation: complete  Nandini Peoples CMA (AAMA)    "

## 2017-03-21 NOTE — PROGRESS NOTES
[facility]  Pain Management Progress Note  Text Page     Assessment & Plan   Katy Islas is a 26 year old female who was admitted on 2/23/2017.     Met with Katy as she was returning to bed after walking using walker and assistance of therapy.  aided in our discussion. Katy was in good spirits and explained that the pain is tolerable. She would like Lidoderm patches on dismissal, as they were helpful. Happy to oblige. She plans to dismiss today.     1) Acute on chronic low back pain with left leg radiculopathy due to L4-5 degenerative disc disease, lateral recess and foraminal stenosis status post L4-5 fusion and discectomy 2/23/2017 pr Dr. Eugene.   History of L4-L5 discectomy 10/2011     2)  Patient with chronic low back pain, on chronic opioid therapy managed by PCP Aura Rosario ([place], [place])   [facility] database review:  Hydrocodone-Acetaminophen 5/325 mg (30 tablets obtained 2/13/2017) Lorazepam 0.5 mg 1/9/2017   Patient indicates she could take 2 Hydrocodone-Acetaminophen 5/325 mg at bedtime and none during the day  10 mg Daily Morphine Equivalent    Patient's opioid use thus far: 450 mcg IV Fentanyl and 10.6 mg IV Dilaudid  = 347 mg Daily Morphine Equivalent     3) Opioid induced side-effects:  -Constipation - NO  -Nausea/Vomit - NO  -Sedation- No  - Itching - YES     4) Other/Related:  -Arthritis  -Fibromyalgia  -Anxiety/Depression with borderline personality disorder  -Epilepsy   -Disease of addiction - 72 hour hold 10/13/2013 after agitated when demands for pain medication were not met. Age 13 psychiatric hospitalization for overdose      PLAN:   1) Dilaudid 2 mg oral every 3 hours prn     2) Tylenol 975 mg every 8 hours     3)Multimodal Medication Therapy  Topical: Lidoderm patchs at bedtime  NSAIDS: Ketorolac 30 mg every 6 hours as ordered by surgeon  Muscle Relaxants: Valium 5 mg  Oral prn  Adjuvants: Imitrex 50 mg for  headache  Antidepressants/anxiolytics: Cymbalta EC 60 mg Daily; Abilify 2 mg at bedtime  Opioids: Dilaudid 2 mg oral every 3 hours prn     4)Non-medication interventions  Ice  Rest alternating with physical therapy     5)Constipation Prophylaxis  Senna - S BID; Claude Lax prn     6) DC safety  -Opioid Safety information included in After Visit Summary (AVS)    Kavita Munson MS, RN, CNS, APRN, ACHPN, FAACVPR  Pain and Palliative Care  Pager [phone]  Office [phone]         Time Spent on this Encounter   I spent  20 minutes is assessment of the patient and discussion with the patient and family.  Another 10 minutes in review of chart, documentation and discussion with the health care team.      Interval History   Chart reviewed - pain well controlled over night and patient slept well    Review of Systems    C: NEGATIVE for fever, chills, change in weight  E/M: NEGATIVE for ear, mouth and throat problems  R: NEGATIVE for significant cough or SOB  CV: NEGATIVE for chest pain, palpitations or peripheral edema    Physical Exam   Temp:  [96.8  F (36  C)-98.1  F (36.7  C)] 97  F (36.1  C)  Pulse:  [83] 83  Heart Rate:  [] 68  Resp:  [7-20] 16  BP: (102-141)/(56-95) 137/86  FiO2 (%):  [100 %] 100 %  SpO2:  [91 %-100 %] 99 %  147 lbs 0 oz  GEN:  Alert, oriented x 3, appears comfortable, NAD.  HEENT:  Normocephalic/atraumatic, no scleral icterus, no nasal discharge, mouth moist.  RESP:  Symmetric chest rise on inhalation noted.  Normal respiratory effort.   SKIN:  Warm and dry to touch, no exanthems noted in the visualized areas.    PAIN BEHAVIOR: Cooperative  Psych:  Normal affect.  Calm, cooperative, conversant appropriately (via ).    Medications     0.45% sodium chloride + KCl 20 mEq/L 100 mL/hr at 02/24/17 0428       ARIPiprazole  2 mg Oral At Bedtime     pantoprazole  40 mg Oral Daily     DULoxetine  60 mg Oral Daily     fluticasone  2 spray Both Nostrils Daily      fluticasone-vilanterol  1 puff Inhalation Daily     gabapentin  300 mg Oral TID     sucralfate  1 g Oral 4x Daily     sodium chloride (PF)  3 mL Intracatheter Q8H     acetaminophen  975 mg Oral Q8H     ketorolac  30 mg Intravenous Q6H     HYDROmorphone   Intravenous PCA     lidocaine  2 patch Transdermal Q24h    And     lidocaine   Transdermal Q24H    And     lidocaine   Transdermal Q8H     senna-docusate  1-2 tablet Oral BID       Data   Results for orders placed or performed during the hospital encounter of 02/23/17 (from the past 24 hour(s))   XR Surgery HALEY L/T 5 Min Fluoro w Stills    Narrative    This exam was marked as non-reportable because it will not be read by a   radiologist or a [place] non-radiologist provider.               *Note: Due to a large number of results and/or encounters for the requested time period, some results have not been displayed. A complete set of results can be found in Results Review.        Never smoker

## 2017-03-21 NOTE — PROGRESS NOTES
"Hearing Aid Check    SUBJECTIVE:  Katy Islas is a 27 year old female, was seen today for a hearing aid check following hearing aid fitting on 17. She reports that the earmolds don't fit well, an she has to take the hearing aids out every few days because of the pain. She also reports that she is hearing much better with these aids; she now can hear her cat \"meow.\" She would like hearing aid batteries. Finally, she wonders if she can get a dry and store jar, as she is seeing moisture in the tubing occasionally.       OBJECTIVE:  Otoscopy revealed clear ear canals bilaterally. The helix looks very red/sore bilaterally, specifically the inferior portion of the helix.     The earmolds will be remade, specifically the helix lock will be reduced.     A 90 day supply (24 cells of size 675) of batteries was given to Katy, and will be billed to her insurance company ([employer]).     Listening check revealed good output and sound quality, binaurally.     Data loggin hrs/day     Auto acclimatization was reset to 80%, going to 100% over the next 90 days.     The IOI-HA was administered and will be entered into the database.     Electroacoustic analysis was performed on the aids and revealed good functionality and directionality.       PLAN: Return to clinic in 3-4 weeks to be fit with remade molds. At her follow up appointment, a marianne and store will be given to Katy.        Natalie Zimmerman, -AAA   Clinical Audiologist, [place] #[id number]   3/21/2017        "

## 2017-03-21 NOTE — MR AVS SNAPSHOT
After Visit Summary   3/21/2017    Katy Islas    MRN: [id number]           Patient Information     Date Of Birth          1990        Visit Information        Provider Department      3/21/2017 11:00 AM Dayana Roldan; Therese Piper AuD [facility]        Today's Diagnoses     Sensorineural hearing loss, bilateral    -  1       Follow-ups after your visit        Your next 10 appointments already scheduled     Mar 28, 2017 11:45 AM CDT   Office Visit with Aura Rosario PA-C   [facility]    [address] [place] [id number] [phone]           Bring a current list of meds and any records pertaining to this visit.  For Physicals, please bring immunization records and any forms needing to be filled out.  Please arrive 10 minutes early to complete paperwork.            Apr 18, 2017 11:00 AM CDT   Return Visit with Natalie Zimmerman   [facility] ([facility])    [address] [place] [id number] [phone]              Who to contact     If you have questions or need follow up information about today's clinic visit or your schedule please contact [facility] directly at [phone].  Normal or non-critical lab and imaging results will be communicated to you by MyChart, letter or phone within 4 business days after the clinic has received the results. If you do not hear from us within 7 days, please contact the clinic through MyChart or phone. If you have a critical or abnormal lab result, we will notify you by phone as soon as possible.  Submit refill requests through [employer] or call your pharmacy and they will forward the refill request to us. Please allow 3 business days for your refill to be completed.          Additional Information About Your Visit        [employer] Information     [employer] gives you secure access to your  electronic health record. If you see a primary care provider, you can also send messages to your care team and make appointments. If you have questions, please call your primary care clinic.  If you do not have a primary care provider, please call [phone] and they will assist you.        Care EveryWhere ID     This is your Care EveryWhere ID. This could be used by other organizations to access your [place] medical records  [id number]        Your Vitals Were     Last Period                   02/02/2012            Blood Pressure from Last 3 Encounters:   03/21/17 114/80   03/17/17 120/74   03/13/17 126/80    Weight from Last 3 Encounters:   03/21/17 151 lb 9.6 oz (68.8 kg)   03/17/17 146 lb (66.2 kg)   03/13/17 154 lb (69.9 kg)              We Performed the Following     HEARING DEVICE BATTERY          Where to get your medicines      These medications were sent to [facility] - [address]  [address] [facility] [address]     Phone:  [phone]     meclizine 12.5 MG tablet    ondansetron 4 MG ODT tab          Primary Care Provider Office Phone # Fax #    Aura Rosario PA-C [phone]       [facility] [address] [facility] [address]  [facility] [address]        Thank you!     Thank you for choosing [facility]  for your care. Our goal is always to provide you with excellent care. Hearing back from our patients is one way we can continue to improve our services. Please take a few minutes to complete the written survey that you may receive in the mail after your visit with us. Thank you!             Your Updated Medication List - Protect others around you: Learn how to safely use, store and throw away your medicines at www.disposemymeds.org.          This list is accurate as of: 3/21/17 11:55 AM.  Always use your most recent med list.                   Brand Name Dispense Instructions for use    ALLEGRA 180 MG tablet    Generic drug:  fexofenadine      Take 180 mg by mouth daily       ARIPiprazole 2 MG tablet    ABILIFY    90 tablet    Take 1 tablet (2 mg) by mouth At Bedtime       cyclobenzaprine 10 MG tablet    FLEXERIL    60 tablet    Take 1 tablet (10 mg) by mouth 2 times daily as needed for muscle spasms or other (back pain)       dexlansoprazole 60 MG Cpdr CR capsule    DEXILANT    90 capsule    Take 1 capsule (60 mg) by mouth daily       dicyclomine 20 MG tablet    BENTYL    30 tablet    Take 1 tablet (20 mg) by mouth 4 times daily as needed (ABDOMINAL CRAMPING) AS NEEDED FOR CRAMPING       DULoxetine 60 MG EC capsule    CYMBALTA    90 capsule    Take 1 capsule (60 mg) by mouth daily       EPINEPHrine 0.3 MG/0.3ML injection     0.6 mL    Inject 0.3 mLs (0.3 mg) into the muscle once as needed for anaphylaxis       fentaNYL 12 mcg/hr 72 hr patch    DURAGESIC    10 patch    Place 1 patch onto the skin every 72 hours       FLEX-A-MIN JOINT FLEX PO      Take by mouth daily       fluticasone 50 MCG/ACT spray    FLONASE    1 Bottle    Spray 2 sprays into both nostrils daily       fluticasone-salmeterol 100-50 MCG/DOSE diskus inhaler    ADVAIR    1 Inhaler    Inhale 1 puff into the lungs 2 times daily       gabapentin 300 MG capsule    NEURONTIN    270 capsule    Take 1 capsule (300 mg) by mouth 3 times daily       lidocaine 5 % Patch    LIDODERM    30 patch    Apply up to 3 patches to painful area at once for up to 12 h within a 24 h period.  Remove after 12 hours.       meclizine 12.5 MG tablet    ANTIVERT    30 tablet    Take 1 tablet (12.5 mg) by mouth 4 times daily as needed for dizziness       medical cannabis inhalation (Patient's own supply.  Not a prescription)      Inhale into the lungs 3 times daily as needed Reported on 2/13/2017       Multi-vitamin Tabs tablet   Generic drug:  multivitamin, therapeutic with minerals      Take 1 tablet by mouth daily.       naproxen 500 MG tablet    NAPROSYN    180 tablet    Take 1  tablet (500 mg) by mouth 2 times daily as needed for moderate pain       ondansetron 4 MG ODT tab    ZOFRAN ODT    30 tablet    Take 1-2 tablets (4-8 mg) by mouth every 6 hours as needed for nausea Take 1-2 tablets by mouth as needed for nausea       ondansetron 4 MG tablet    ZOFRAN    30 tablet    Take 1 tablet (4 mg) by mouth every 8 hours as needed for nausea       * order for DME     1 Units    Equipment being ordered: Nebulizer       * order for DME     1 each    Equipment being ordered: Walker () Treatment Diagnosis: S/P SPINE SURGERY       * order for DME     1 each    Equipment being ordered: Walker Wheels () and Walker () Treatment Diagnosis: difficulty walking       oxyCODONE 10 MG 12 hr tablet    OxyCONTIN    28 tablet    Take 1 tablet (10 mg) by mouth every 12 hours maximum 2 tablet(s) per day       promethazine 25 MG tablet    PHENERGAN    60 tablet    Take 1 tablet (25 mg) by mouth every 6 hours as needed for nausea       sucralfate 1 GM tablet    CARAFATE     Take by mouth 4 times daily Reported on 2/13/2017       SUMAtriptan 50 MG tablet    IMITREX    18 tablet    Take 1 tablet (50 mg) by mouth at onset of headache for migraine May repeat in 2 hours. Max 8 tablets/24 hours.       Vitamin D-3 5000 UNITS Tabs      Take by mouth daily Reported on 2/13/2017       * Notice:  This list has 3 medication(s) that are the same as other medications prescribed for you. Read the directions carefully, and ask your doctor or other care provider to review them with you.

## 2017-03-21 NOTE — PROGRESS NOTES
SUBJECTIVE:                                                    Katy Islas is a 27 year old female who presents to clinic today for the following health issues:      \[facility]\""        Hospital Follow-up Visit:    Hospital/Nursing Home/IP Rehab Facility: [facility]  Date of Admission: 3-17  Date of Discharge: 3-17  Reason(s) for Admission: migraine            Problems taking medications regularly:  Sometimes I forget       Medication changes since discharge: gave ativert, and zolfran and she needs refills.       Problems adhering to non-medication therapy:  No, pt thinks its the patch that causes the migraines, she wants to know if she can stop the patch.     Thinks the fentaly is causing hermigraines and vertigo and it was really bad she had to go to the emergency room, throwing up every half hour. Would like it changed to something else oral.   Chronic back pain management. She just had surgery 1 month on Thursday. I am doing pretty good, starting PT next week. Pain currently stable with patch. Only side effects.     Summary of hospitalization:  [facility] discharge summary reviewed  Diagnostic Tests/Treatments reviewed.  Follow up needed: None   Other Healthcare Providers Involved in Patient s Care: PCP         Update since discharge: Improved a ton since Emergency Room. I get migraines more often with the patch on. Whe I was not on patch Migraine 1-2 a year. And now it is once or twice a week. Nausea is awful, taking zofran twice daily. Eating ok now after discharge. Dizziness- No dizziness today. Migraine, last migraine was Friday. Sensitive to light, dizziness, nausea and I know a Migraine is coming.     Post Discharge Medication Reconciliation: discharge medications reconciled, continue medications without change.  Plan of care communicated with patient     Coding guidelines for this visit:  Type of Medical   Decision Making Face-to-Face Visit       within 7 Days of discharge  Face-to-Face Visit        within 14 days of discharge   Moderate Complexity [id number]   High Complexity [id number]            Problem list and histories reviewed & adjusted, as indicated.  Additional history: as documented      ROS:  C: NEGATIVE for fever, chills, change in weight  E/M: NEGATIVE for ear, mouth and throat problems  R: NEGATIVE for significant cough or SOB  CV: NEGATIVE for chest pain, palpitations or peripheral edema    OBJECTIVE:                                                    /80  Pulse 127  Temp 98.6  F (37  C) (Temporal)  Resp 8  Wt 151 lb 9.6 oz (68.8 kg)  LMP 02/02/2012  SpO2 100%  BMI 28.64 kg/m2  Body mass index is 28.64 kg/(m^2).  GENERAL: healthy, alert and no distress  NEURO: Normal strength and tone, mentation intact and speech normal  PSYCH: mentation appears normal, affect normal/bright    Diagnostic Test Results:  Declined urine drug screen      ASSESSMENT/PLAN:                                                      1. Chronic bilateral low back pain with left-sided sciatica  See summary Below.   - Drug abuse screen, urine (Pain Care Package)    2. Chronic pain syndrome    - Drug abuse screen, urine (Pain Care Package)    3. Nausea    - ondansetron (ZOFRAN ODT) 4 MG ODT tab; Take 1-2 tablets (4-8 mg) by mouth every 6 hours as needed for nausea Take 1-2 tablets by mouth as needed for nausea  Dispense: 30 tablet; Refill: 0    4. Dizziness    - meclizine (ANTIVERT) 12.5 MG tablet; Take 1 tablet (12.5 mg) by mouth 4 times daily as needed for dizziness  Dispense: 30 tablet; Refill: 0    5. Chronic migraine without aura without status migrainosus, not intractable        Patient was seen today in follow up for her ED visit. I discussed with her the entire visit and the interventions. She reports to me that since being seen in the Emergency Room she has improved. Her nausea is managed with the Zofran and the Antivert helps the dizziness if and when she has a Migraine. Today she comes  to me to discuss discontinuing her Fentanyl patch. Based on chart review patient is followed by CDL and is currently tapering off her patch. She was originally on 25mcg and was decreased on March 13th to 12 mcg. It looks like this is occurring in 2 week intervals based on latest note. Patient reports that last week she started the 12mcg along with the OxyContin for breakthrough. She reports that she developed migraines and nausea with the OxyContin over the course of the week. She reports she was unsure if this was from the patch or the OxyContin. She reports throwing away the OxyContin. She continued the Fentanyl patch, which did not improve her symptoms. She reports that her symptoms were so bad she ended up in the Emergency Room for control of her headache, nausea and dizziness. She also developed a rash on her inner thighs. She contributed this to her Fentanyl patch and would like to go off of it. She reports that she normally only has about 1-2 Migraines a year and has been having 1-2 a week lately.  She reports her rash has improved with benadryl and she reports it was dry skin.     She would like to have OxyContin refilled today and be off Fentanyl patch. I discussed with her that I did not feel it was appropriate and I did not feel comfortable discontinuing the patch. 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 I did offer her a urine drug screen and a small dose of oxycodone, she reports her pain is controlled with the Fentanyl patch and she does not need extra medication. She is due for a urine drug, and declined this. I discussed with her that I do not feel it is appropriate to refill the OxyContin given she is early for refill and according to  she had both the 20mg and 10mg filled last week. She decreased her dose because she was too sleepy on the 20mg. I discussed with her I can respect this, however I am not sure the long term plan after the Fentanyl patch and  I feel this would be better reviewed and managed by her primary care provider. Patient was very respectful of this plan. She requested a refill of the Zofran and Antivert, I did provide this for her. She will visit with PCP next week. We discussed signs and symptoms to monitor and she was given this on her patient instructions. If she changes her mind and wants more pain control, I did advise her I would need to do a urine drug before prescribing any additional short term narcotic. She appears stable in clinic today, she reports she had a slight headache this AM took some tylenol. She is feeling good at this time.       Interpretor provided sign language and present during entire length of visit.     NATA Retana [facility]

## 2017-03-21 NOTE — PATIENT INSTRUCTIONS
- Recommend continuing patch until you are able to visit with your PCP next Tuesday at 11:45 at the [facility].   - If you change your mind on wanting break through medication please let me know, we will need to do a urine drug prior to any prescriptions given it is too early to prescribe.   - If you have any worsening symptoms that are not managed by the Antivert and Zofran please contact the clinic or go into the Emergency Room.   - Please feel free to contact me at the clinic if you have any questions or concerns.     NATA Retana CNP

## 2017-03-23 NOTE — PROGRESS NOTES
SUBJECTIVE:                                                    Katy Islas is a 27 year old female who presents to clinic today for the following health issues:      HPI  Depression and Anxiety Follow-Up    Status since last visit: Improved     Other associated symptoms:None    Complicating factors:     Significant life event: No     Current substance abuse: None    PHQ-9 SCORE 12/2/2016 12/30/2016 2/13/2017   Total Score - - -   Total Score MyChart - - 1 (Minimal depression)   Total Score 3 16 -     JULIETH-7 SCORE 12/30/2016 1/6/2017 2/13/2017   Total Score - - -   Total Score - - 2 (minimal anxiety)   Total Score 18 20 -        PHQ-9  English      PHQ-9   Any Language     GAD7     Back Pain Follow Up       Description:   Location of pain:  Left lower and left leg  Character of pain: dull ache  Pain radiation: radiates into the left buttocks and radiates below the knee - is having a lot of twitching - its annoying her, but ortho states its normal  Since last visit, pain is:  improved  New numbness or weakness in legs, not attributed to pain:  no     Intensity: Currently 2/10    History:   Pain interferes with job: Not applicable  Therapies tried without relief: none  Therapies tried with relief: surgery        Accompanying Signs & Symptoms:  Risk of Fracture:  None  Risk of Cauda Equina:  None  Risk of Infection:  None  Risk of Cancer:  None    Pt would like to stop fentanyl patch today, thinks shes getting headaches because of them  - Migraines from Fentanyl patch  - Used to get 1-2 per year, now 1-2 per week  - Physical therapy     - 3 or 4 patches left   - No Oxycontin left        Patient note from 3/27/17  Hi. I had a headache pretty bad Friday night and still feeling lousy. I'm having twitching all over especially on my face. It feels like my whole body is sort of vibrating, buzzing. It's hard to explain and I hate this. Di want to stop fentanyl. It's not normal for me to get headaches or migraines.  Usually few in a year. Now it's few a week!     Spine doctor appointment Friday went well though. The cat scan looked great and he gave the okay to ride horses again in nine months!       Plan from 3/21/17  She would like to have OxyContin refilled today and be off Fentanyl patch. I discussed with her that I did not feel it was appropriate and I did not feel comfortable discontinuing the patch. 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 I did offer her a urine drug screen and a small dose of oxycodone, she reports her pain is controlled with the Fentanyl patch and she does not need extra medication. She is due for a urine drug, and declined this. I discussed with her that I do not feel it is appropriate to refill the OxyContin given she is early for refill and according to  she had both the 20mg and 10mg filled last week. She decreased her dose because she was too sleepy on the 20mg. I discussed with her I can respect this, however I am not sure the long term plan after the Fentanyl patch and I feel this would be better reviewed and managed by her primary care provider. Patient was very respectful of this plan. She requested a refill of the Zofran and Antivert, I did provide this for her. She will visit with PCP next week. We discussed signs and symptoms to monitor and she was given this on her patient instructions. If she changes her mind and wants more pain control, I did advise her I would need to do a urine drug before prescribing any additional short term narcotic. She appears stable in clinic today, she reports she had a slight headache this AM took some tylenol. She is feeling good at this time.     Plan from 3/13/17  - S/p lumbar fusion 2/23/17   - Start/continue physical therapy   - Fentanyl patch, every 72 hours, discussed use and side effects, will require a taper off, decrease dosing to 12 mg, reviewed use and side effects   - Oxycontin 20 mg for break through  pain, no more than 2/day, may cause sedation, discussed use and side effects, if too sedating, may need to go back to percocet   - Discussed both medication use and side effects   - Recheck every 2 weeks       Problem list and histories reviewed & adjusted, as indicated.  Additional history: as documented    ROS:  Constitutional, HEENT, cardiovascular, pulmonary, gi and gu systems are negative, except as otherwise noted.    OBJECTIVE:                                                    /84 (BP Location: Right arm, Patient Position: Chair, Cuff Size: Adult Regular)  Pulse 126  Temp 99  F (37.2  C) (Oral)  Resp 16  Wt 154 lb (69.9 kg)  SpO2 100%  BMI 29.1 kg/m2  Body mass index is 29.1 kg/(m^2).  GENERAL APPEARANCE: healthy, alert and no distress  EYES: Eyes grossly normal to inspection, PERRLA, conjunctivae and sclerae without injection or discharge, EOM intact   MS: No musculoskeletal defects are noted and gait is age appropriate without ataxia   SKIN: No suspicious lesions or rashes, hydration status appears adeuqate with normal skin turgor    BACK: Deferred   PSYCH: Alert and oriented x3; speech- coherent , normal rate and volume; able to articulate logical thoughts, able to abstract reason, no tangential thoughts, no hallucinations or delusions, mentation appears normal, Mood is euthymic. Affect is appropriate for this mood state and bright. Thought content is free of suicidal ideation, hallucinations, and delusions. Dress is adequate and upkept. Eye contact is good during conversation.       Diagnostic Test Results:  none      ASSESSMENT/PLAN:                                                        ICD-10-CM    1. Major depressive disorder, recurrent episode, mild (H) F33.0    2. Anxiety F41.9    3. Bipolar affective disorder in remission (H) F31.70    4. Adjustment disorder with mixed anxiety and depressed mood F43.23 cloNIDine (CATAPRES) 0.1 MG tablet   5. S/P lumbar fusion Z98.1 cloNIDine (CATAPRES)  0.1 MG tablet     oxyCODONE (ROXICODONE) 5 MG IR tablet     DISCONTINUED: oxyCODONE-acetaminophen (PERCOCET) 5-325 MG per tablet   6. Chronic bilateral low back pain with left-sided sciatica M54.42 cloNIDine (CATAPRES) 0.1 MG tablet    G89.29 oxyCODONE (ROXICODONE) 5 MG IR tablet     DISCONTINUED: oxyCODONE-acetaminophen (PERCOCET) 5-325 MG per tablet   7. Lumbar disc disease with radiculopathy M51.16 oxyCODONE (ROXICODONE) 5 MG IR tablet     DISCONTINUED: oxyCODONE-acetaminophen (PERCOCET) 5-325 MG per tablet   8. Chronic migraine without aura without status migrainosus, not intractable G43.709 SUMAtriptan (IMITREX) 50 MG tablet     1-4. Mood  - Stable on Abilify and Cymbalta   - OK for refills as needed     5-7. Back pain   - S/p lumbar fusion 2/23/17   - Start/continue physical therapy   - STOP Fentanyl patch       - Tonight - Start Clonidine, twice a day      - Tomorrow AM STOP Fentanyl patch       - Start Percocet - 2 tablets three times a day for 4-5 days       After this, can decrease by 1 tablet every 2-3 days as needed to maintain pain control   - Discussed both medication use and side effects   - Recheck every 2 weeks       The patient indicates understanding of these issues and agrees with the plan.    Follow up: 2 weeks     Due to language barrier, an  was present during the history-taking and subsequent discussion (and for part of the physical exam) with this patient.        Aura Rosario PA-C  [facility]

## 2017-03-24 ENCOUNTER — TRANSFERRED RECORDS (OUTPATIENT)
Dept: HEALTH INFORMATION MANAGEMENT | Facility: CLINIC | Age: 27
End: 2017-03-24

## 2017-03-27 ENCOUNTER — MYC MEDICAL ADVICE (OUTPATIENT)
Dept: FAMILY MEDICINE | Facility: OTHER | Age: 27
End: 2017-03-27

## 2017-03-28 ENCOUNTER — OFFICE VISIT (OUTPATIENT)
Dept: FAMILY MEDICINE | Facility: OTHER | Age: 27
End: 2017-03-28
Payer: MEDICARE

## 2017-03-28 VITALS
RESPIRATION RATE: 16 BRPM | BODY MASS INDEX: 29.1 KG/M2 | HEART RATE: 126 BPM | TEMPERATURE: 99 F | SYSTOLIC BLOOD PRESSURE: 126 MMHG | OXYGEN SATURATION: 100 % | WEIGHT: 154 LBS | DIASTOLIC BLOOD PRESSURE: 84 MMHG

## 2017-03-28 DIAGNOSIS — F31.70 BIPOLAR AFFECTIVE DISORDER IN REMISSION (H): ICD-10-CM

## 2017-03-28 DIAGNOSIS — G43.709 CHRONIC MIGRAINE WITHOUT AURA WITHOUT STATUS MIGRAINOSUS, NOT INTRACTABLE: ICD-10-CM

## 2017-03-28 DIAGNOSIS — G89.29 CHRONIC BILATERAL LOW BACK PAIN WITH LEFT-SIDED SCIATICA: ICD-10-CM

## 2017-03-28 DIAGNOSIS — Z98.1 S/P LUMBAR FUSION: ICD-10-CM

## 2017-03-28 DIAGNOSIS — F41.9 ANXIETY: ICD-10-CM

## 2017-03-28 DIAGNOSIS — F43.23 ADJUSTMENT DISORDER WITH MIXED ANXIETY AND DEPRESSED MOOD: ICD-10-CM

## 2017-03-28 DIAGNOSIS — M51.16 LUMBAR DISC DISEASE WITH RADICULOPATHY: ICD-10-CM

## 2017-03-28 DIAGNOSIS — M54.42 CHRONIC BILATERAL LOW BACK PAIN WITH LEFT-SIDED SCIATICA: ICD-10-CM

## 2017-03-28 DIAGNOSIS — F33.0 MAJOR DEPRESSIVE DISORDER, RECURRENT EPISODE, MILD (H): Primary | ICD-10-CM

## 2017-03-28 PROCEDURE — 99214 OFFICE O/P EST MOD 30 MIN: CPT | Performed by: PHYSICIAN ASSISTANT

## 2017-03-28 PROCEDURE — T1013 SIGN LANG/ORAL INTERPRETER: HCPCS | Mod: U3 | Performed by: PHYSICIAN ASSISTANT

## 2017-03-28 RX ORDER — CLONIDINE HYDROCHLORIDE 0.1 MG/1
0.1 TABLET ORAL 2 TIMES DAILY
Qty: 30 TABLET | Refills: 1 | Status: SHIPPED | OUTPATIENT
Start: 2017-03-28 | End: 2017-05-16

## 2017-03-28 RX ORDER — SUMATRIPTAN 50 MG/1
50 TABLET, FILM COATED ORAL
Qty: 18 TABLET | Refills: 3 | Status: SHIPPED | OUTPATIENT
Start: 2017-03-28 | End: 2017-07-01

## 2017-03-28 RX ORDER — OXYCODONE AND ACETAMINOPHEN 5; 325 MG/1; MG/1
1 TABLET ORAL EVERY 4 HOURS PRN
Qty: 84 TABLET | Refills: 0 | Status: SHIPPED | OUTPATIENT
Start: 2017-03-28 | End: 2017-03-28

## 2017-03-28 RX ORDER — OXYCODONE HYDROCHLORIDE 5 MG/1
5 TABLET ORAL EVERY 4 HOURS PRN
Qty: 84 TABLET | Refills: 0 | Status: SHIPPED | OUTPATIENT
Start: 2017-03-28 | End: 2017-04-25

## 2017-03-28 ASSESSMENT — ANXIETY QUESTIONNAIRES
3. WORRYING TOO MUCH ABOUT DIFFERENT THINGS: NOT AT ALL
5. BEING SO RESTLESS THAT IT IS HARD TO SIT STILL: SEVERAL DAYS
GAD7 TOTAL SCORE: 4
6. BECOMING EASILY ANNOYED OR IRRITABLE: SEVERAL DAYS
1. FEELING NERVOUS, ANXIOUS, OR ON EDGE: SEVERAL DAYS
7. FEELING AFRAID AS IF SOMETHING AWFUL MIGHT HAPPEN: NOT AT ALL
2. NOT BEING ABLE TO STOP OR CONTROL WORRYING: NOT AT ALL

## 2017-03-28 ASSESSMENT — PATIENT HEALTH QUESTIONNAIRE - PHQ9: 5. POOR APPETITE OR OVEREATING: SEVERAL DAYS

## 2017-03-28 ASSESSMENT — PAIN SCALES - GENERAL: PAINLEVEL: MILD PAIN (2)

## 2017-03-28 NOTE — NURSING NOTE
"Chief Complaint   Patient presents with     RECHECK     multiple issues     Panel Management     lipid, phq9/isis, aap, act, urine drug screen.       Initial /84 (BP Location: Right arm, Patient Position: Chair, Cuff Size: Adult Regular)  Pulse 126  Temp 99  F (37.2  C) (Oral)  Resp 16  Wt 154 lb (69.9 kg)  SpO2 100%  BMI 29.1 kg/m2 Estimated body mass index is 29.1 kg/(m^2) as calculated from the following:    Height as of 2/27/17: 5' 1\" (1.549 m).    Weight as of this encounter: 154 lb (69.9 kg).  Medication Reconciliation: complete  "

## 2017-03-28 NOTE — PATIENT INSTRUCTIONS
- Tonight - Start Clonidine, twice a day   - Tomorrow AM STOP Fentanyl patch   - Start Percocet - 2 tablets three times a day for 4-5 days       After this, can decrease by 1 tablet every 2-3 days as needed to maintain pain control     - Recheck 2 weeks

## 2017-03-28 NOTE — MR AVS SNAPSHOT
After Visit Summary   3/28/2017    Katy Islas    MRN: [id number]           Patient Information     Date Of Birth          1990        Visit Information        Provider Department      3/28/2017 11:45 AM Shira Faustin; Aura Rosario, [facility]        Today's Diagnoses     Major depressive disorder, recurrent episode, mild (H)    -  1    Anxiety        Bipolar affective disorder in remission (H)        Adjustment disorder with mixed anxiety and depressed mood        S/P lumbar fusion        Chronic bilateral low back pain with left-sided sciatica        Lumbar disc disease with radiculopathy        Chronic migraine without aura without status migrainosus, not intractable          Care Instructions    - Tonight - Start Clonidine, twice a day   - Tomorrow AM STOP Fentanyl patch   - Start Percocet - 2 tablets three times a day for 4-5 days       After this, can decrease by 1 tablet every 2-3 days as needed to maintain pain control     - Recheck 2 weeks         Follow-ups after your visit        Your next 10 appointments already scheduled     Apr 18, 2017  8:00 AM CDT   [facility] Visit with Natalie Zimmerman   [facility]    [address] [place] [id number] [phone]              Who to contact     If you have questions or need follow up information about today's clinic visit or your schedule please contact [facility] directly at [phone].  Normal or non-critical lab and imaging results will be communicated to you by MyChart, letter or phone within 4 business days after the clinic has received the results. If you do not hear from us within 7 days, please contact the clinic through MyChart or phone. If you have a critical or abnormal lab result, we will notify you by phone as soon as possible.  Submit refill requests through [employer] or call your pharmacy and they will  forward the refill request to us. Please allow 3 business days for your refill to be completed.          Additional Information About Your Visit        [employer] Information     [employer] gives you secure access to your electronic health record. If you see a primary care provider, you can also send messages to your care team and make appointments. If you have questions, please call your primary care clinic.  If you do not have a primary care provider, please call [phone] and they will assist you.        Care EveryWhere ID     This is your Care EveryWhere ID. This could be used by other organizations to access your [place] medical records  [id number]        Your Vitals Were     Pulse Temperature Respirations Pulse Oximetry BMI (Body Mass Index)       126 99  F (37.2  C) (Oral) 16 100% 29.1 kg/m2        Blood Pressure from Last 3 Encounters:   03/28/17 126/84   03/21/17 114/80   03/17/17 120/74    Weight from Last 3 Encounters:   03/28/17 154 lb (69.9 kg)   03/21/17 151 lb 9.6 oz (68.8 kg)   03/17/17 146 lb (66.2 kg)              Today, you had the following     No orders found for display         Today's Medication Changes          These changes are accurate as of: 3/28/17 12:27 PM.  If you have any questions, ask your nurse or doctor.               Start taking these medicines.        Dose/Directions    cloNIDine 0.1 MG tablet   Commonly known as:  CATAPRES   Used for:  Adjustment disorder with mixed anxiety and depressed mood, S/P lumbar fusion, Chronic bilateral low back pain with left-sided sciatica   Started by:  Aura Rosario PA-C        Dose:  0.1 mg   Take 1 tablet (0.1 mg) by mouth 2 times daily   Quantity:  30 tablet   Refills:  1       oxyCODONE 5 MG IR tablet   Commonly known as:  ROXICODONE   Used for:  S/P lumbar fusion, Chronic bilateral low back pain with left-sided sciatica, Lumbar disc disease with radiculopathy   Started by:  Aura Rosario PA-C        Dose:   5 mg   Take 1 tablet (5 mg) by mouth every 4 hours as needed for pain maximum 6 tablet(s) per day   Quantity:  84 tablet   Refills:  0            Where to get your medicines      [place] [facility] [place] [address]  [address], [facility] [address]     Phone:  [phone]     cloNIDine 0.1 MG tablet    SUMAtriptan 50 MG tablet         Some of these will need a paper prescription and others can be bought over the counter.  Ask your nurse if you have questions.     Bring a paper prescription for each of these medications     oxyCODONE 5 MG IR tablet                Primary Care Provider Office Phone # Fax #    Aura Rosario PA-C [phone]       [facility] [address]  [facility] [address]        Thank you!     Thank you for choosing [facility]  for your care. Our goal is always to provide you with excellent care. Hearing back from our patients is one way we can continue to improve our services. Please take a few minutes to complete the written survey that you may receive in the mail after your visit with us. Thank you!             Your Updated Medication List - Protect others around you: Learn how to safely use, store and throw away your medicines at www.disposemymeds.org.          This list is accurate as of: 3/28/17 12:27 PM.  Always use your most recent med list.                   Brand Name Dispense Instructions for use    ALLEGRA 180 MG tablet   Generic drug:  fexofenadine      Take 180 mg by mouth daily Reported on 3/28/2017       ARIPiprazole 2 MG tablet    ABILIFY    90 tablet    Take 1 tablet (2 mg) by mouth At Bedtime       cloNIDine 0.1 MG tablet    CATAPRES    30 tablet    Take 1 tablet (0.1 mg) by mouth 2 times daily       cyclobenzaprine 10 MG tablet    FLEXERIL    60 tablet    Take 1 tablet (10 mg) by mouth 2 times daily as needed for muscle spasms or other (back pain)        dexlansoprazole 60 MG Cpdr CR capsule    DEXILANT    90 capsule    Take 1 capsule (60 mg) by mouth daily       dicyclomine 20 MG tablet    BENTYL    30 tablet    Take 1 tablet (20 mg) by mouth 4 times daily as needed (ABDOMINAL CRAMPING) AS NEEDED FOR CRAMPING       DULoxetine 60 MG EC capsule    CYMBALTA    90 capsule    Take 1 capsule (60 mg) by mouth daily       EPINEPHrine 0.3 MG/0.3ML injection     0.6 mL    Inject 0.3 mLs (0.3 mg) into the muscle once as needed for anaphylaxis       FLEX-A-MIN JOINT FLEX PO      Take by mouth daily Reported on 3/28/2017       fluticasone 50 MCG/ACT spray    FLONASE    1 Bottle    Spray 2 sprays into both nostrils daily       fluticasone-salmeterol 100-50 MCG/DOSE diskus inhaler    ADVAIR    1 Inhaler    Inhale 1 puff into the lungs 2 times daily       gabapentin 300 MG capsule    NEURONTIN    270 capsule    Take 1 capsule (300 mg) by mouth 3 times daily       lidocaine 5 % Patch    LIDODERM    30 patch    Apply up to 3 patches to painful area at once for up to 12 h within a 24 h period.  Remove after 12 hours.       meclizine 12.5 MG tablet    ANTIVERT    30 tablet    Take 1 tablet (12.5 mg) by mouth 4 times daily as needed for dizziness       medical cannabis inhalation (Patient's own supply.  Not a prescription)      Inhale into the lungs 3 times daily as needed Reported on 3/28/2017       Multi-vitamin Tabs tablet   Generic drug:  multivitamin, therapeutic with minerals      Take 1 tablet by mouth daily Reported on 3/28/2017       ondansetron 4 MG ODT tab    ZOFRAN ODT    30 tablet    Take 1-2 tablets (4-8 mg) by mouth every 6 hours as needed for nausea Take 1-2 tablets by mouth as needed for nausea       ondansetron 4 MG tablet    ZOFRAN    30 tablet    Take 1 tablet (4 mg) by mouth every 8 hours as needed for nausea       * order for DME     1 Units    Equipment being ordered: Nebulizer       * order for DME     1 each    Equipment being ordered: Walker ()  Treatment Diagnosis: S/P SPINE SURGERY       * order for DME     1 each    Equipment being ordered: Walker Wheels () and Walker () Treatment Diagnosis: difficulty walking       oxyCODONE 5 MG IR tablet    ROXICODONE    84 tablet    Take 1 tablet (5 mg) by mouth every 4 hours as needed for pain maximum 6 tablet(s) per day       promethazine 25 MG tablet    PHENERGAN    60 tablet    Take 1 tablet (25 mg) by mouth every 6 hours as needed for nausea       sucralfate 1 GM tablet    CARAFATE     Take by mouth 4 times daily Reported on 3/28/2017       SUMAtriptan 50 MG tablet    IMITREX    18 tablet    Take 1 tablet (50 mg) by mouth at onset of headache for migraine May repeat in 2 hours. Max 8 tablets/24 hours.       Vitamin D-3 5000 UNITS Tabs      Take by mouth daily Reported on 3/28/2017       * Notice:  This list has 3 medication(s) that are the same as other medications prescribed for you. Read the directions carefully, and ask your doctor or other care provider to review them with you.

## 2017-03-29 ASSESSMENT — PATIENT HEALTH QUESTIONNAIRE - PHQ9: SUM OF ALL RESPONSES TO PHQ QUESTIONS 1-9: 1

## 2017-03-29 ASSESSMENT — ASTHMA QUESTIONNAIRES: ACT_TOTALSCORE: 22

## 2017-03-29 ASSESSMENT — ANXIETY QUESTIONNAIRES: GAD7 TOTAL SCORE: 4

## 2017-04-04 ENCOUNTER — OFFICE VISIT (OUTPATIENT)
Dept: FAMILY MEDICINE | Facility: OTHER | Age: 27
End: 2017-04-04
Payer: MEDICARE

## 2017-04-04 VITALS
TEMPERATURE: 99 F | OXYGEN SATURATION: 99 % | SYSTOLIC BLOOD PRESSURE: 110 MMHG | HEART RATE: 110 BPM | BODY MASS INDEX: 27.78 KG/M2 | WEIGHT: 147 LBS | DIASTOLIC BLOOD PRESSURE: 84 MMHG | RESPIRATION RATE: 16 BRPM

## 2017-04-04 DIAGNOSIS — K59.03 DRUG-INDUCED CONSTIPATION: ICD-10-CM

## 2017-04-04 DIAGNOSIS — K64.4 EXTERNAL HEMORRHOIDS: Primary | ICD-10-CM

## 2017-04-04 PROCEDURE — 99214 OFFICE O/P EST MOD 30 MIN: CPT | Performed by: PHYSICIAN ASSISTANT

## 2017-04-04 RX ORDER — AMOXICILLIN 250 MG
1 CAPSULE ORAL 2 TIMES DAILY PRN
Qty: 60 TABLET | Refills: 3 | Status: SHIPPED | OUTPATIENT
Start: 2017-04-04 | End: 2017-09-11

## 2017-04-04 ASSESSMENT — PAIN SCALES - GENERAL: PAINLEVEL: MODERATE PAIN (4)

## 2017-04-04 NOTE — MR AVS SNAPSHOT
After Visit Summary   4/4/2017    Katy Islas    MRN: [id number]           Patient Information     Date Of Birth          1990        Visit Information        Provider Department      4/4/2017 2:00 PM Aura Rosario PA-C; [facility]        Today's Diagnoses     External hemorrhoids    -  1    Drug-induced constipation          Care Instructions    - 2-3x/day can use antibiotic ointment on rectal area  - 2 times daily use steroid cream  - Start Senna-S            Follow-ups after your visit        Your next 10 appointments already scheduled     Apr 11, 2017 10:00 AM CDT   [facility]   [facility]    [address] [address] [phone]           Bring a current list of meds and any records pertaining to this visit.  For Physicals, please bring immunization records and any forms needing to be filled out.  Please arrive 10 minutes early to complete paperwork.            Apr 18, 2017  8:00 AM CDT   Return Visit with Natalie Zimmerman Laura Wilbur   [facility]    [address] [address] [phone]              Who to contact     If you have questions or need follow up information about today's clinic visit or your schedule please contact [facility] directly at [phone].  Normal or non-critical lab and imaging results will be communicated to you by MyChart, letter or phone within 4 business days after the clinic has received the results. If you do not hear from us within 7 days, please contact the clinic through MyChart or phone. If you have a critical or abnormal lab result, we will notify you by phone as soon as possible.  Submit refill requests through [employer] or call your pharmacy and they will forward the refill request to us. Please allow 3  business days for your refill to be completed.          Additional Information About Your Visit        MyChart Information     [employer] gives you secure access to your electronic health record. If you see a primary care provider, you can also send messages to your care team and make appointments. If you have questions, please call your primary care clinic.  If you do not have a primary care provider, please call [phone] and they will assist you.        Care EveryWhere ID     This is your Care EveryWhere ID. This could be used by other organizations to access your [facility] medical records  [id number]        Your Vitals Were     Pulse Temperature Respirations Pulse Oximetry BMI (Body Mass Index)       110 99  F (37.2  C) (Oral) 16 99% 27.78 kg/m2        Blood Pressure from Last 3 Encounters:   04/04/17 110/84   03/28/17 126/84   03/21/17 114/80    Weight from Last 3 Encounters:   04/04/17 147 lb (66.7 kg)   03/28/17 154 lb (69.9 kg)   03/21/17 151 lb 9.6 oz (68.8 kg)              Today, you had the following     No orders found for display         Today's Medication Changes          These changes are accurate as of: 4/4/17  2:25 PM.  If you have any questions, ask your nurse or doctor.               Start taking these medicines.        Dose/Directions    hydrocortisone 2.5 % cream   Commonly known as:  ANUSOL-HC   Used for:  External hemorrhoids   Started by:  Aura Rosario PA-C        Place rectally 2 times daily as needed for hemorrhoids   Quantity:  30 g   Refills:  3       senna-docusate 8.6-50 MG per tablet   Commonly known as:  SENOKOT-S;PERICOLACE   Used for:  Drug-induced constipation   Started by:  Aura Rosario PA-C        Dose:  1 tablet   Take 1 tablet by mouth 2 times daily as needed for constipation   Quantity:  60 tablet   Refills:  3            Where to get your medicines      These medications were sent to [facility] - [address]  [facility]  [address] [facility], [facility] [address]     Phone:  [phone]     hydrocortisone 2.5 % cream    senna-docusate 8.6-50 MG per tablet                Primary Care Provider Office Phone # Fax #    Aura EMILY Rosario PA-C [phone]       [facility] [address] [facility] [address]  [facility] [address]        Thank you!     Thank you for choosing [facility]  for your care. Our goal is always to provide you with excellent care. Hearing back from our patients is one way we can continue to improve our services. Please take a few minutes to complete the written survey that you may receive in the mail after your visit with us. Thank you!             Your Updated Medication List - Protect others around you: Learn how to safely use, store and throw away your medicines at www.disposemymeds.org.          This list is accurate as of: 4/4/17  2:25 PM.  Always use your most recent med list.                   Brand Name Dispense Instructions for use    ALLEGRA 180 MG tablet   Generic drug:  fexofenadine      Take 180 mg by mouth daily Reported on 3/28/2017       ARIPiprazole 2 MG tablet    ABILIFY    90 tablet    Take 1 tablet (2 mg) by mouth At Bedtime       cloNIDine 0.1 MG tablet    CATAPRES    30 tablet    Take 1 tablet (0.1 mg) by mouth 2 times daily       cyclobenzaprine 10 MG tablet    FLEXERIL    60 tablet    Take 1 tablet (10 mg) by mouth 2 times daily as needed for muscle spasms or other (back pain)       dexlansoprazole 60 MG Cpdr CR capsule    DEXILANT    90 capsule    Take 1 capsule (60 mg) by mouth daily       dicyclomine 20 MG tablet    BENTYL    30 tablet    Take 1 tablet (20 mg) by mouth 4 times daily as needed (ABDOMINAL CRAMPING) AS NEEDED FOR CRAMPING       DULoxetine 60 MG EC capsule    CYMBALTA    90 capsule    Take 1 capsule (60 mg) by mouth daily       EPINEPHrine 0.3 MG/0.3ML injection     0.6 mL    Inject 0.3 mLs (0.3 mg) into the muscle once as needed for  anaphylaxis       FLEX-A-MIN JOINT FLEX PO      Take by mouth daily Reported on 3/28/2017       fluticasone 50 MCG/ACT spray    FLONASE    1 Bottle    Spray 2 sprays into both nostrils daily       fluticasone-salmeterol 100-50 MCG/DOSE diskus inhaler    ADVAIR    1 Inhaler    Inhale 1 puff into the lungs 2 times daily       gabapentin 300 MG capsule    NEURONTIN    270 capsule    Take 1 capsule (300 mg) by mouth 3 times daily       hydrocortisone 2.5 % cream    ANUSOL-HC    30 g    Place rectally 2 times daily as needed for hemorrhoids       lidocaine 5 % Patch    LIDODERM    30 patch    Apply up to 3 patches to painful area at once for up to 12 h within a 24 h period.  Remove after 12 hours.       meclizine 12.5 MG tablet    ANTIVERT    30 tablet    Take 1 tablet (12.5 mg) by mouth 4 times daily as needed for dizziness       medical cannabis inhalation (Patient's own supply.  Not a prescription)      Inhale into the lungs 3 times daily as needed Reported on 3/28/2017       Multi-vitamin Tabs tablet   Generic drug:  multivitamin, therapeutic with minerals      Take 1 tablet by mouth daily Reported on 3/28/2017       ondansetron 4 MG ODT tab    ZOFRAN ODT    30 tablet    Take 1-2 tablets (4-8 mg) by mouth every 6 hours as needed for nausea Take 1-2 tablets by mouth as needed for nausea       ondansetron 4 MG tablet    ZOFRAN    30 tablet    Take 1 tablet (4 mg) by mouth every 8 hours as needed for nausea       * order for DME     1 Units    Equipment being ordered: Nebulizer       * order for DME     1 each    Equipment being ordered: Walker () Treatment Diagnosis: S/P SPINE SURGERY       * order for DME     1 each    Equipment being ordered: Walker Wheels () and Walker () Treatment Diagnosis: difficulty walking       oxyCODONE 5 MG IR tablet    ROXICODONE    84 tablet    Take 1 tablet (5 mg) by mouth every 4 hours as needed for pain maximum 6 tablet(s) per day       promethazine 25 MG tablet     PHENERGAN    60 tablet    Take 1 tablet (25 mg) by mouth every 6 hours as needed for nausea       senna-docusate 8.6-50 MG per tablet    SENOKOT-S;PERICOLACE    60 tablet    Take 1 tablet by mouth 2 times daily as needed for constipation       sucralfate 1 GM tablet    CARAFATE     Take by mouth 4 times daily Reported on 3/28/2017       SUMAtriptan 50 MG tablet    IMITREX    18 tablet    Take 1 tablet (50 mg) by mouth at onset of headache for migraine May repeat in 2 hours. Max 8 tablets/24 hours.       Vitamin D-3 5000 UNITS Tabs      Take by mouth daily Reported on 3/28/2017       * Notice:  This list has 3 medication(s) that are the same as other medications prescribed for you. Read the directions carefully, and ask your doctor or other care provider to review them with you.

## 2017-04-04 NOTE — PROGRESS NOTES
SUBJECTIVE:                                                    Katy Islas is a 27 year old female who presents to clinic today for the following health issues:    Hemorrhoids     Onset: 2 days ago    Description:   Pain: no   Itching: YES    Accompanying Signs & Symptoms:  Blood streaked toilet paper: YES- pink  Blood in stool: no   Changes in stool pattern: YES- constipation   History:   Any previous GI studies done:none  Family History of colon cancer: no     Precipitating factors:   None    Alleviating factors:  None    Pt states had BM today, hard and hurt to go  Thinks the pain meds are making her constipated.        Therapies Tried and outcome: miralax - have upset stomach     History: as documented    ROS:  Constitutional, HEENT, cardiovascular, pulmonary, gi and gu systems are negative, except as otherwise noted.    OBJECTIVE:                                                    /84 (BP Location: Left arm, Patient Position: Chair, Cuff Size: Adult Regular)  Pulse 110  Temp 99  F (37.2  C) (Oral)  Resp 16  Wt 147 lb (66.7 kg)  SpO2 99%  BMI 27.78 kg/m2  Body mass index is 27.78 kg/(m^2).  GENERAL APPEARANCE: healthy, alert and no distress  EYES: Eyes grossly normal to inspection, PERRLA, conjunctivae and sclerae without injection or discharge, EOM intact   MS: No musculoskeletal defects are noted and gait is age appropriate without ataxia   SKIN: No suspicious lesions or rashes, hydration status appears adeuqate with normal skin turgor    Rectal exam: 1 very small external hemorrhoidal noted, not thrombosed, 3 very small 0.2-0.3 open wounds/fissues proximal to vaginal area, no signs of infection    PSYCH: Alert and oriented x3; speech- coherent , normal rate and volume; able to articulate logical thoughts, able to abstract reason, no tangential thoughts, no hallucinations or delusions, mentation appears normal, Mood is euthymic. Affect is appropriate for this mood state and bright. Thought  content is free of suicidal ideation, hallucinations, and delusions. Dress is adequate and upkept. Eye contact is good during conversation.       Diagnostic Test Results:  none      ASSESSMENT/PLAN:                                                        ICD-10-CM    1. External hemorrhoids K64.4 hydrocortisone (ANUSOL-HC) 2.5 % cream   2. Drug-induced constipation K59.03 senna-docusate (SENOKOT-S;PERICOLACE) 8.6-50 MG per tablet     - Discussed exam findings   - 2-3x/day can use antibiotic ointment on rectal area to help with healing  - 2 times daily use steroid cream for itching   - Start Senna-S, discussed will needs to stay on this until tapered off of pain medications, start with 2 times daily, then if improves can do once daily       Due to language barrier, an  was present during the history-taking and subsequent discussion (and for part of the physical exam) with this patient.    The patient indicates understanding of these issues and agrees with the plan.    Follow up: next week for pain medication recheck, will also recheck fisremy Rosario PA-C  [facility]

## 2017-04-04 NOTE — PATIENT INSTRUCTIONS
- 2-3x/day can use antibiotic ointment on rectal area  - 2 times daily use steroid cream  - Start Senna-S

## 2017-04-04 NOTE — NURSING NOTE
"No chief complaint on file.      Initial /84 (BP Location: Left arm, Patient Position: Chair, Cuff Size: Adult Regular)  Pulse 110  Temp 99  F (37.2  C) (Oral)  Resp 16  Wt 147 lb (66.7 kg)  SpO2 99%  BMI 27.78 kg/m2 Estimated body mass index is 27.78 kg/(m^2) as calculated from the following:    Height as of 2/27/17: 5' 1\" (1.549 m).    Weight as of this encounter: 147 lb (66.7 kg).  Medication Reconciliation: complete  "

## 2017-04-06 ENCOUNTER — MEDICAL CORRESPONDENCE (OUTPATIENT)
Dept: HEALTH INFORMATION MANAGEMENT | Facility: CLINIC | Age: 27
End: 2017-04-06

## 2017-04-06 ENCOUNTER — TELEPHONE (OUTPATIENT)
Dept: FAMILY MEDICINE | Facility: OTHER | Age: 27
End: 2017-04-06

## 2017-04-06 NOTE — TELEPHONE ENCOUNTER
Reason for call:  Form  Reason for Call:  Form, our goal is to have forms completed with 72 hours, however, some forms may require a visit or additional information.    Type of letter, form or note:  medical    Who is the form from?: Home care    Where did the form come from: form was faxed in    What clinic location was the form placed at?: [facility] - [phone]    Where the form was placed: [facility]    What number is listed as a contact on the form?: [phone]       Additional comments: physicians statement of medical necessity, sign and date    Call taken on 4/6/2017 at 8:19 AM by Sheryl Ruby

## 2017-04-09 ENCOUNTER — MYC MEDICAL ADVICE (OUTPATIENT)
Dept: FAMILY MEDICINE | Facility: OTHER | Age: 27
End: 2017-04-09

## 2017-04-10 ENCOUNTER — OFFICE VISIT (OUTPATIENT)
Dept: FAMILY MEDICINE | Facility: OTHER | Age: 27
End: 2017-04-10
Payer: MEDICARE

## 2017-04-10 VITALS
SYSTOLIC BLOOD PRESSURE: 122 MMHG | BODY MASS INDEX: 28.64 KG/M2 | WEIGHT: 151.6 LBS | DIASTOLIC BLOOD PRESSURE: 76 MMHG | RESPIRATION RATE: 16 BRPM | HEART RATE: 107 BPM | OXYGEN SATURATION: 100 % | TEMPERATURE: 98 F

## 2017-04-10 DIAGNOSIS — G89.29 CHRONIC BILATERAL LOW BACK PAIN WITH LEFT-SIDED SCIATICA: ICD-10-CM

## 2017-04-10 DIAGNOSIS — Z98.1 S/P LUMBAR FUSION: ICD-10-CM

## 2017-04-10 DIAGNOSIS — M51.16 LUMBAR DISC DISEASE WITH RADICULOPATHY: ICD-10-CM

## 2017-04-10 DIAGNOSIS — K59.03 DRUG-INDUCED CONSTIPATION: ICD-10-CM

## 2017-04-10 DIAGNOSIS — G89.4 CHRONIC PAIN SYNDROME: Primary | ICD-10-CM

## 2017-04-10 DIAGNOSIS — M54.42 CHRONIC BILATERAL LOW BACK PAIN WITH LEFT-SIDED SCIATICA: ICD-10-CM

## 2017-04-10 PROCEDURE — 99214 OFFICE O/P EST MOD 30 MIN: CPT | Performed by: PHYSICIAN ASSISTANT

## 2017-04-10 RX ORDER — HYDROCODONE BITARTRATE AND ACETAMINOPHEN 5; 325 MG/1; MG/1
1-2 TABLET ORAL EVERY 4 HOURS PRN
Qty: 45 TABLET | Refills: 0 | Status: SHIPPED | OUTPATIENT
Start: 2017-04-10 | End: 2017-04-14

## 2017-04-10 RX ORDER — CYCLOBENZAPRINE HCL 10 MG
10 TABLET ORAL 2 TIMES DAILY PRN
Qty: 60 TABLET | Refills: 3 | Status: SHIPPED | OUTPATIENT
Start: 2017-04-10 | End: 2017-08-03

## 2017-04-10 ASSESSMENT — PATIENT HEALTH QUESTIONNAIRE - PHQ9: SUM OF ALL RESPONSES TO PHQ QUESTIONS 1-9: 2

## 2017-04-10 ASSESSMENT — PAIN SCALES - GENERAL: PAINLEVEL: MODERATE PAIN (4)

## 2017-04-10 NOTE — NURSING NOTE
"Chief Complaint   Patient presents with     Rectal Problem       Initial /76 (BP Location: Right arm, Patient Position: Chair, Cuff Size: Adult Regular)  Pulse 107  Temp 98  F (36.7  C) (Temporal)  Resp 16  Wt 151 lb 9.6 oz (68.8 kg)  SpO2 100%  BMI 28.64 kg/m2 Estimated body mass index is 28.64 kg/(m^2) as calculated from the following:    Height as of 2/27/17: 5' 1\" (1.549 m).    Weight as of this encounter: 151 lb 9.6 oz (68.8 kg).  Medication Reconciliation: complete   Aileen De La Cruz CMA      "

## 2017-04-10 NOTE — MR AVS SNAPSHOT
After Visit Summary   4/10/2017    Katy Islas    MRN: [id number]           Patient Information     Date Of Birth          1990        Visit Information        Provider Department      4/10/2017 8:30 AM Aura Rosario PA-C; [facility]        Today's Diagnoses     Chronic pain syndrome    -  1    Chronic bilateral low back pain with left-sided sciatica        Lumbar disc disease with radiculopathy        S/P lumbar fusion          Care Instructions    - STOP clonidine   - Norco as needed   - Recheck 1 month         Follow-ups after your visit        Your next 10 appointments already scheduled     Apr 11, 2017 10:00 AM CDT   Office Visit with Aura Rosario PA-C, Loida Quinonez   [facility]    [address] [place] [id number] [phone]           Bring a current list of meds and any records pertaining to this visit.  For Physicals, please bring immunization records and any forms needing to be filled out.  Please arrive 10 minutes early to complete paperwork.            Apr 18, 2017  8:00 AM CDT   Return Visit with Natalie Zimmerman Laura Wilbur   [facility]    [address] [place] [id number] [phone]              Who to contact     If you have questions or need follow up information about today's clinic visit or your schedule please contact [facility] directly at [phone].  Normal or non-critical lab and imaging results will be communicated to you by MyChart, letter or phone within 4 business days after the clinic has received the results. If you do not hear from us within 7 days, please contact the clinic through [employer] or phone. If you have a critical or abnormal lab result, we will notify you by phone as soon as possible.  Submit refill requests through [email or web address] or call your  pharmacy and they will forward the refill request to us. Please allow 3 business days for your refill to be completed.          Additional Information About Your Visit        [employer] Information     [employer] gives you secure access to your electronic health record. If you see a primary care provider, you can also send messages to your care team and make appointments. If you have questions, please call your primary care clinic.  If you do not have a primary care provider, please call [phone] and they will assist you.        Care EveryWhere ID     This is your Care EveryWhere ID. This could be used by other organizations to access your [facility] medical records  [id number]        Your Vitals Were     Pulse Temperature Respirations Pulse Oximetry BMI (Body Mass Index)       107 98  F (36.7  C) (Temporal) 16 100% 28.64 kg/m2        Blood Pressure from Last 3 Encounters:   04/10/17 122/76   04/04/17 110/84   03/28/17 126/84    Weight from Last 3 Encounters:   04/10/17 151 lb 9.6 oz (68.8 kg)   04/04/17 147 lb (66.7 kg)   03/28/17 154 lb (69.9 kg)              Today, you had the following     No orders found for display         Today's Medication Changes          These changes are accurate as of: 4/10/17  9:01 AM.  If you have any questions, ask your nurse or doctor.               Start taking these medicines.        Dose/Directions    HYDROcodone-acetaminophen 5-325 MG per tablet   Commonly known as:  NORCO   Used for:  Chronic pain syndrome, Chronic bilateral low back pain with left-sided sciatica, Lumbar disc disease with radiculopathy, S/P lumbar fusion   Started by:  Aura Rosario PA-C        Dose:  1-2 tablet   Take 1-2 tablets by mouth every 4 hours as needed for moderate to severe pain maximum 2 tablet(s) per day   Quantity:  45 tablet   Refills:  0            Where to get your medicines      These medications were sent to [facility] [place], [place] - [address] [facility]  [address]  [facility] [address]     Phone:  [phone]     cyclobenzaprine 10 MG tablet         Some of these will need a paper prescription and others can be bought over the counter.  Ask your nurse if you have questions.     Bring a paper prescription for each of these medications     HYDROcodone-acetaminophen 5-325 MG per tablet                Primary Care Provider Office Phone # Fax #    Aura EMILY Rosario PA-C [phone]       [facility] [address] [facility] [address]  [facility] [address]        Thank you!     Thank you for choosing [facility]  for your care. Our goal is always to provide you with excellent care. Hearing back from our patients is one way we can continue to improve our services. Please take a few minutes to complete the written survey that you may receive in the mail after your visit with us. Thank you!             Your Updated Medication List - Protect others around you: Learn how to safely use, store and throw away your medicines at www.disposemymeds.org.          This list is accurate as of: 4/10/17  9:01 AM.  Always use your most recent med list.                   Brand Name Dispense Instructions for use    ALLEGRA 180 MG tablet   Generic drug:  fexofenadine      Take 180 mg by mouth daily Reported on 3/28/2017       ARIPiprazole 2 MG tablet    ABILIFY    90 tablet    Take 1 tablet (2 mg) by mouth At Bedtime       cloNIDine 0.1 MG tablet    CATAPRES    30 tablet    Take 1 tablet (0.1 mg) by mouth 2 times daily       cyclobenzaprine 10 MG tablet    FLEXERIL    60 tablet    Take 1 tablet (10 mg) by mouth 2 times daily as needed for muscle spasms or other (back pain)       dexlansoprazole 60 MG Cpdr CR capsule    DEXILANT    90 capsule    Take 1 capsule (60 mg) by mouth daily       dicyclomine 20 MG tablet    BENTYL    30 tablet    Take 1 tablet (20 mg) by mouth 4 times daily as needed (ABDOMINAL CRAMPING) AS NEEDED FOR CRAMPING        DULoxetine 60 MG EC capsule    CYMBALTA    90 capsule    Take 1 capsule (60 mg) by mouth daily       EPINEPHrine 0.3 MG/0.3ML injection     0.6 mL    Inject 0.3 mLs (0.3 mg) into the muscle once as needed for anaphylaxis       FLEX-A-MIN JOINT FLEX PO      Take by mouth daily Reported on 3/28/2017       fluticasone 50 MCG/ACT spray    FLONASE    1 Bottle    Spray 2 sprays into both nostrils daily       fluticasone-salmeterol 100-50 MCG/DOSE diskus inhaler    ADVAIR    1 Inhaler    Inhale 1 puff into the lungs 2 times daily       gabapentin 300 MG capsule    NEURONTIN    270 capsule    Take 1 capsule (300 mg) by mouth 3 times daily       HYDROcodone-acetaminophen 5-325 MG per tablet    NORCO    45 tablet    Take 1-2 tablets by mouth every 4 hours as needed for moderate to severe pain maximum 2 tablet(s) per day       hydrocortisone 2.5 % cream    ANUSOL-HC    30 g    Place rectally 2 times daily as needed for hemorrhoids       lidocaine 5 % Patch    LIDODERM    30 patch    Apply up to 3 patches to painful area at once for up to 12 h within a 24 h period.  Remove after 12 hours.       meclizine 12.5 MG tablet    ANTIVERT    30 tablet    Take 1 tablet (12.5 mg) by mouth 4 times daily as needed for dizziness       medical cannabis inhalation (Patient's own supply.  Not a prescription)      Inhale into the lungs 3 times daily as needed Reported on 3/28/2017       Multi-vitamin Tabs tablet   Generic drug:  multivitamin, therapeutic with minerals      Take 1 tablet by mouth daily Reported on 3/28/2017       ondansetron 4 MG ODT tab    ZOFRAN ODT    30 tablet    Take 1-2 tablets (4-8 mg) by mouth every 6 hours as needed for nausea Take 1-2 tablets by mouth as needed for nausea       ondansetron 4 MG tablet    ZOFRAN    30 tablet    Take 1 tablet (4 mg) by mouth every 8 hours as needed for nausea       * order for DME     1 Units    Equipment being ordered: Nebulizer       * order for DME     1 each    Equipment being  ordered: Walker () Treatment Diagnosis: S/P SPINE SURGERY       * order for DME     1 each    Equipment being ordered: Walker Wheels () and Walker () Treatment Diagnosis: difficulty walking       oxyCODONE 5 MG IR tablet    ROXICODONE    84 tablet    Take 1 tablet (5 mg) by mouth every 4 hours as needed for pain maximum 6 tablet(s) per day       promethazine 25 MG tablet    PHENERGAN    60 tablet    Take 1 tablet (25 mg) by mouth every 6 hours as needed for nausea       senna-docusate 8.6-50 MG per tablet    SENOKOT-S;PERICOLACE    60 tablet    Take 1 tablet by mouth 2 times daily as needed for constipation       sucralfate 1 GM tablet    CARAFATE     Take by mouth 4 times daily Reported on 3/28/2017       SUMAtriptan 50 MG tablet    IMITREX    18 tablet    Take 1 tablet (50 mg) by mouth at onset of headache for migraine May repeat in 2 hours. Max 8 tablets/24 hours.       Vitamin D-3 5000 UNITS Tabs      Take by mouth daily Reported on 3/28/2017       * Notice:  This list has 3 medication(s) that are the same as other medications prescribed for you. Read the directions carefully, and ask your doctor or other care provider to review them with you.

## 2017-04-10 NOTE — PROGRESS NOTES
SUBJECTIVE:                                                    Katy Islas is a 27 year old female who presents to clinic today for the following health issues:      Back Pain Follow Up       Description:   Location of pain:  left  Character of pain: dull ache and constant  Pain radiation: radiates into the left buttocks  Since last visit, pain is:  worsened  New numbness or weakness in legs, not attributed to pain:  no     Intensity: moderate    History:   Pain interferes with job: Not applicable  Therapies tried without relief: none  Therapies tried with relief: acetaminophen (Tylenol), cold, muscle relaxants, Physical Therapy and stretch      Accompanying Signs & Symptoms:  Risk of Fracture:  None  Risk of Cauda Equina:  None  Risk of Infection:  None  Risk of Cancer:  None    - Stopped taking percocet last Wednesday  - Tried of upset stomach and benadryl  - Headache with it, no headache since stopped   - Sore today  - Had 1 physical therapy, 2nd one today  - Didn't sleep well   - Needs Flexeril   -      Concern - Rectal pain follow up      Onset: 2 weeks    Description:   Rectal fissures, pt states it is a lot better than it was, states it is almost gone    Intensity: moderate    Progression of Symptoms:  improving    Accompanying Signs & Symptoms:  Pain       Previous history of similar problem:   Yes    Precipitating factors:   Worsened by: Constipation    Alleviating factors:  Improved by: Senna       Therapies Tried and outcome: Senna - helped with constipation.           Amount of exercise or physical activity: 2-3 days/week for an average of 45-60 minutes    Problems taking medications regularly: No, states sometimes she forgets to take depression medication    Medication side effects: oxycodone - itchy, headaches, upset stomach    Diet: regular (no restrictions)    Problem list and histories reviewed & adjusted, as indicated.  Additional history: as documented    Answers for HPI/ROS submitted by  the patient on 4/10/2017   PHQ9 TOTAL SCORE: 2        Plan from 3/28/17  5-7. Back pain   - S/p lumbar fusion 2/23/17   - Start/continue physical therapy   - STOP Fentanyl patch   - Tonight - Start Clonidine, twice a day   - Tomorrow AM STOP Fentanyl patch   - Start Percocet - 2 tablets three times a day for 4-5 days   After this, can decrease by 1 tablet every 2-3 days as needed to maintain pain control   - Discussed both medication use and side effects   - Recheck every 2 weeks          ROS:  Constitutional, HEENT, cardiovascular, pulmonary, gi and gu systems are negative, except as otherwise noted.    OBJECTIVE:                                                    /76 (BP Location: Right arm, Patient Position: Chair, Cuff Size: Adult Regular)  Pulse 107  Temp 98  F (36.7  C) (Temporal)  Resp 16  Wt 151 lb 9.6 oz (68.8 kg)  SpO2 100%  BMI 28.64 kg/m2  Body mass index is 28.64 kg/(m^2).  GENERAL APPEARANCE: healthy, alert and no distress  EYES: Eyes grossly normal to inspection, PERRLA, conjunctivae and sclerae without injection or discharge, EOM intact   MS: No musculoskeletal defects are noted and gait is age appropriate without ataxia   SKIN: No suspicious lesions or rashes, hydration status appears adeuqate with normal skin turgor   NEURO: Deferred   BACK: Deferred, patient in back brace   Rectal exam: Declined by patient     PSYCH: Alert and oriented x3; speech- coherent , normal rate and volume; able to articulate logical thoughts, able to abstract reason, no tangential thoughts, no hallucinations or delusions, mentation appears normal, Mood is euthymic. Affect is appropriate for this mood state and bright. Thought content is free of suicidal ideation, hallucinations, and delusions. Dress is adequate and upkept. Eye contact is good during conversation.       Diagnostic Test Results:  none      ASSESSMENT/PLAN:                                                        ICD-10-CM    1. Chronic pain  syndrome G89.4 HYDROcodone-acetaminophen (NORCO) 5-325 MG per tablet   2. Chronic bilateral low back pain with left-sided sciatica M54.42 cyclobenzaprine (FLEXERIL) 10 MG tablet    G89.29 HYDROcodone-acetaminophen (NORCO) 5-325 MG per tablet   3. Lumbar disc disease with radiculopathy M51.16 HYDROcodone-acetaminophen (NORCO) 5-325 MG per tablet   4. S/P lumbar fusion Z98.1 HYDROcodone-acetaminophen (NORCO) 5-325 MG per tablet   5. Drug-induced constipation K59.03      1-4. Back pain  - S/p lumbar fusion 2/23/17   - Continue physical therapy   - Now off Percocet for 5 days, no withdrawal, ok to stop clonidine   - Start Norco, reviewed use and side effects, mostly 1 at night and will give 5 extra for breakthrough pain after physical therapy  - Continue flexeril   -  reviewed today 4/10/17      Routine fills reflecting our taper off pain medications going back to 2/23/17      Prior to that fills from her surgeon or covering partner     One ED fill from 1/2/17    - Recheck 1 month, plan to d/c pain medications at next visit     5. Constipation  - Improved on Senna  - Discussed importance of staying on this until pain medications are stopped     The patient indicates understanding of these issues and agrees with the plan.    Follow up: 1 month        Due to language barrier, an  was present during the history-taking and subsequent discussion (and for part of the physical exam) with this patient.      Aura Tomlinson-LOVE Moss  [facility]

## 2017-04-11 ASSESSMENT — PATIENT HEALTH QUESTIONNAIRE - PHQ9: SUM OF ALL RESPONSES TO PHQ QUESTIONS 1-9: 2

## 2017-04-14 ENCOUNTER — MYC MEDICAL ADVICE (OUTPATIENT)
Dept: FAMILY MEDICINE | Facility: OTHER | Age: 27
End: 2017-04-14

## 2017-04-14 ENCOUNTER — MYC REFILL (OUTPATIENT)
Dept: FAMILY MEDICINE | Facility: OTHER | Age: 27
End: 2017-04-14

## 2017-04-14 DIAGNOSIS — G89.29 CHRONIC BILATERAL LOW BACK PAIN WITH LEFT-SIDED SCIATICA: ICD-10-CM

## 2017-04-14 DIAGNOSIS — M54.42 CHRONIC BILATERAL LOW BACK PAIN WITH LEFT-SIDED SCIATICA: ICD-10-CM

## 2017-04-14 DIAGNOSIS — M51.16 LUMBAR DISC DISEASE WITH RADICULOPATHY: ICD-10-CM

## 2017-04-14 DIAGNOSIS — Z98.1 S/P LUMBAR FUSION: ICD-10-CM

## 2017-04-14 DIAGNOSIS — G89.4 CHRONIC PAIN SYNDROME: ICD-10-CM

## 2017-04-17 NOTE — TELEPHONE ENCOUNTER
Gabpentin 300 mg      Last Written Prescription Date:  12/30/2016  Last Fill Quantity: 270,   # refills: 1  Last Office Visit with [facility], [facility] or [facility] prescribing provider: 4/11/2017  Future Office visit:    Next 5 appointments (look out 90 days)     Apr 18, 2017  8:00 AM CDT   [facility]erman [facility] Wilbur   [facility]    [address] [address]   [phone]                   Routing refill request to provider for review/approval because:  Drug not on the [facility], [facility] or [facility] refill protocol or controlled substance    Lidocaine Patch 5%      Last Written Prescription Date: 2/27/2017  Last Fill Quantity: 30, # refills: 3  Last Office Visit with [facility], [facility] or [facility] prescribing provider: 4/1/2017  Next 5 appointments (look out 90 days)     Apr 18, 2017  8:00 AM CDT   [facility] Visit with Natalie Zimmerman Laura Wilbur   [facility]    [address] [address]   [phone]                   Creatinine   Date Value Ref Range Status   02/28/2017 0.68 0.52 - 1.04 mg/dL Final     Hydrocodone acetaminophen5-325mg     Duplicate request?  Last Written Prescription Date:  4/10/2017  Last Fill Quantity: 45,   # refills: 0  Last Office Visit with [facility], [facility] or [facility] prescribing provider: 4/11/2017  Future Office visit:    Next 5 appointments (look out 90 days)     Apr 18, 2017  8:00 AM CDT   [facility] with Natalie Zimmerman Laura Wilbur   [facility]    [address] [address]   [phone]                   Routing refill request to provider for review/approval because:  Drug not on the [facility], [facility] or [facility] refill protocol or controlled substance    .

## 2017-04-17 NOTE — TELEPHONE ENCOUNTER
Controlled Substance Refill Request for Gabapentin   Problem List Complete:  No     PROVIDER TO CONSIDER COMPLETION OF PROBLEM LIST AND OVERVIEW/CONTROLLED SUBSTANCE AGREEMENT    Last Written Prescription Date:  120/30/2016  Last Fill Quantity: 270,   # refills: 1    Last Office Visit with [facility] primary care provider: 04/10/2017    Future Office visit:   Next 5 appointments (look out 90 days)     Apr 18, 2017  8:00 AM CDT   Return Visit with Natalie Zimmerman, Dayana Roldan   [facility]    [address] [address]   [phone]                  Controlled substance agreement on file: No. Terminated by Dr Giorgio Serna     Processing:  Staff will hand deliver Rx to on-site pharmacy   checked in past 6 months?  Yes 04/10/2017      Controlled Substance Refill Request for Hydrocodone, appears to be a duplicate request.     Lidocaine Patch  Routing refill request to provider for review/approval because:  A break in medication  Nohemi eJnnings RN

## 2017-04-18 ENCOUNTER — MYC MEDICAL ADVICE (OUTPATIENT)
Dept: FAMILY MEDICINE | Facility: OTHER | Age: 27
End: 2017-04-18

## 2017-04-18 ENCOUNTER — TELEPHONE (OUTPATIENT)
Dept: FAMILY MEDICINE | Facility: OTHER | Age: 27
End: 2017-04-18

## 2017-04-18 RX ORDER — LIDOCAINE 50 MG/G
PATCH TOPICAL
Qty: 30 PATCH | Refills: 3 | Status: SHIPPED | OUTPATIENT
Start: 2017-04-18 | End: 2017-10-20

## 2017-04-18 RX ORDER — HYDROCODONE BITARTRATE AND ACETAMINOPHEN 5; 325 MG/1; MG/1
1-2 TABLET ORAL EVERY 4 HOURS PRN
Qty: 45 TABLET | Refills: 0 | Status: SHIPPED | OUTPATIENT
Start: 2017-04-18 | End: 2017-04-18

## 2017-04-18 RX ORDER — GABAPENTIN 300 MG/1
300 CAPSULE ORAL 3 TIMES DAILY
Qty: 270 CAPSULE | Refills: 1 | Status: SHIPPED | OUTPATIENT
Start: 2017-04-18 | End: 2017-09-07

## 2017-04-18 NOTE — TELEPHONE ENCOUNTER
Ins needs a prior auth for this. Tried for pa in feb and was denied so try again . Dr sent a new rx today  04-18  Ins is [employer] at [phone] and pt id # [id number]  Natacha Woodall, Pharmacy [facility] [phone]

## 2017-04-18 NOTE — TELEPHONE ENCOUNTER
Medication(s) reviewed and approved, except Norco     Please contact patient (she responds quickly to MyChart) and find out if she needs this as I gave her #45 on 4/10/17 with a plan for 1-2 per day. She should have plenty left.       Aura Rosario PA-C

## 2017-04-19 NOTE — TELEPHONE ENCOUNTER
No related DX on problem list that Medicare will cover for, case is withdrawn.  Medicare will only cover if cancer related or diabetic related DX.   Please advise

## 2017-04-24 NOTE — PROGRESS NOTES
SUBJECTIVE:     CC: Katy Islas is an 27 year old woman who presents for preventive health visit.     Physical   Annual:     Getting at least 3 servings of Calcium per day::  Yes    Bi-annual eye exam::  Yes    Dental care twice a year::  Yes    Sleep apnea or symptoms of sleep apnea::  Daytime drowsiness    Diet::  Regular (no restrictions)    Frequency of exercise::  4-5 days/week    Duration of exercise::  30-45 minutes    Taking medications regularly::  Yes    Medication side effects::  Not applicable and None    Additional concerns today::  No    - Up to half pack per day,     Pt would like rx for hydrocodone    Back Pain Follow Up        Description:   Location of pain:  left  Character of pain: dull ache and cramping  Pain radiation: Does not radiate  Since last visit, pain is:  improved  New numbness or weakness in legs, not attributed to pain:  no     Intensity: Currently 2/10    History:   Pain interferes with job: No  Therapies tried without relief: none  Therapies tried with relief: bio freeze, cold and Physical Therapy        Accompanying Signs & Symptoms:  Risk of Fracture:  None  Risk of Cauda Equina:  None  Risk of Infection:  None  Risk of Cancer:  None    - 2 during the day and 2 and night     - Took Norco  - Flexeril at night   - Took gabapentin 1 in morning and 2 at night   - - Going physical therapy once a week, and [employer] once a week, both swimming        Today's PHQ-2 Score:   PHQ-2 ( 1999 [employer]) 11/7/2016   Q1: Little interest or pleasure in doing things 1   Q2: Feeling down, depressed or hopeless 1   PHQ-2 Score 2       Answers for HPI/ROS submitted by the patient on 4/25/2017   Q1: Little interest or pleasure in doing things: 0=Not at all  Q2: Feeling down, depressed or hopeless: 0=Not at all  PHQ-2 Score: 0    Abuse: Current or Past(Physical, Sexual or Emotional)- No  Do you feel safe in your environment - Yes    Social History   Substance Use Topics     Smoking status: Current  Every Day Smoker     Types: Cigarettes     Smokeless tobacco: Never Used      Comment: 3 cigarettes weekly     Alcohol use 0.6 oz/week     1 Cans of beer per week      Comment: 1-2 drinks montlhly     The patient does not drink >3 drinks per day nor >7 drinks per week.    No results for input(s): CHOL, HDL, LDL, TRIG, CHOLHDLRATIO, NHDL in the last 91993 hours.    Reviewed orders with patient.  Reviewed health maintenance and updated orders accordingly - Yes    Mammo Decision Support:  Mammogram not appropriate for this patient based on age.    Pertinent mammograms are reviewed under the imaging tab.  History of abnormal Pap smear: NO - age 21-29 PAP every 3 years recommended    Reviewed and updated as needed this visit by clinical staff  Tobacco  Allergies  Med Hx  Surg Hx  Fam Hx  Soc Hx        Reviewed and updated as needed this visit by Provider  Allergies  Meds  Problems        Past Medical History:   Diagnosis Date     Abdominal pain 10/31- 11/4/2005    [facility] admit for Crohn's     Allergic rhinitis, cause unspecified     Allergic rhinitis     Arthritis      C. difficile diarrhea     Past, no current diarrhea.     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, without mention of intractable epilepsy     pseudoseizures diagnosed after extensive neurologic eval     Migraine 07/21/12    D/C 07/22/12-[facility]     Migraine, unspecified, without mention of intractable migraine without mention of status migrainosus     Migraine     Mild intermittent asthma     mild intermittent     Mycoplasma infection in  conditions classified elsewhere and of unspecified site      Other chronic pain     Back pain for 6 years     Renal disease     Kidney stones     Unspecified hearing loss     congenital hearing loss      Past Surgical History:   Procedure Laterality Date     C REMOVAL GALLBLADDER  8/5/2009    [facility].     COLONOSCOPY  7/1/2009    [facility], [facility].     FUSION SPINE POSTERIOR MINIMALLY INVASIVE ONE LEVEL N/A 2/23/2017    Procedure: FUSION SPINE POSTERIOR MINIMALLY INVASIVE ONE LEVEL;  Surgeon: Jeniffer Eugene MD;  Location:  OR      COLONOSCOPY THRU STOMA WITH BIOPSY  10/29/2003    Impression is that of normal appearing colonoscopy, without evidence of rectal bleeding.     HC COLONOSCOPY THRU STOMA, DIAGNOSTIC  10/00    normal     HC COLONOSCOPY THRU STOMA, DIAGNOSTIC  Oct 2009    Dr López- normal     HC EEG AWAKE AND SLEEP      abnormal     HC MRI BRAIN W/O CONTRAST  12/00    normal     [facility] UGI ENDOSCOPY DIAG W BIOPSY  11/11/09    Normal esophagus     HC UGI ENDOSCOPY, SIMPLE EXAM  7/00, 10/00    mild chronic esophagitis and duodenitis, neg H pylori     HC UGI ENDOSCOPY, SIMPLE EXAM  01/20/2005    Esophagogastroduodenoscopy, colonoscopy with biopsies.  [facility] UGI ENDOSCOPY, SIMPLE EXAM  7/1/2009    [facility], [facility].      UGI ENDOSCOPY, SIMPLE EXAM  11/11/2009    attempted upper GI, pt. could not tolerate procedure:[facility]     ORTHOPEDIC SURGERY  October 19,2011    diskectomy L4-L5       ROS:  C: NEGATIVE for fever, chills, change in weight  I: NEGATIVE for worrisome rashes, moles or lesions  E: NEGATIVE for vision changes or irritation  ENT: NEGATIVE for ear, mouth and throat problems  R: NEGATIVE for significant cough or SOB  B: NEGATIVE for masses, tenderness or discharge  CV: NEGATIVE for chest pain, palpitations or peripheral edema  GI: NEGATIVE for nausea, abdominal pain, heartburn, or change in bowel  "habits  : NEGATIVE for unusual urinary or vaginal symptoms. Periods are irregular.  M: NEGATIVE for significant arthralgias or myalgia  N: NEGATIVE for weakness, dizziness or paresthesias  P: NEGATIVE for changes in mood or affect    Problem list, Medication list, Allergies, and Medical/Social/Surgical histories reviewed in [facility] and updated as appropriate.  Labs reviewed in EPIC  BP Readings from Last 3 Encounters:   04/25/17 116/82   04/10/17 122/76   04/04/17 110/84    Wt Readings from Last 3 Encounters:   04/25/17 146 lb (66.2 kg)   04/10/17 151 lb 9.6 oz (68.8 kg)   04/04/17 147 lb (66.7 kg)            OBJECTIVE:     /82 (BP Location: Left arm, Patient Position: Chair, Cuff Size: Adult Regular)  Pulse 118  Temp 98.8  F (37.1  C) (Oral)  Resp 16  Ht 5' 1.73\" (1.568 m)  Wt 146 lb (66.2 kg)  SpO2 100%  BMI 26.94 kg/m2  EXAM:  GENERAL: healthy, alert and no distress  EYES: Eyes grossly normal to inspection, PERRL and conjunctivae and sclerae normal  HENT: ear canals and TM's normal, nose and mouth without ulcers or lesions  NECK: no adenopathy, no asymmetry, masses, or scars and thyroid normal to palpation  RESP: lungs clear to auscultation - no rales, rhonchi or wheezes  BREAST: normal without masses, tenderness or nipple discharge and no palpable axillary masses or adenopathy  CV: regular rate and rhythm, normal S1 S2, no S3 or S4, no murmur, click or rub, no peripheral edema and peripheral pulses strong  ABDOMEN: soft, nontender, no hepatosplenomegaly, no masses and bowel sounds normal   (female): normal female external genitalia, normal urethral meatus, vaginal mucosa pink, moist, well rugated, and normal cervix/adnexa/uterus without masses or discharge  MS: no gross musculoskeletal defects noted, no edema  SKIN: no suspicious lesions or rashes  NEURO: Normal strength and tone, mentation intact and speech normal  PSYCH: mentation appears normal, affect normal/bright  LYMPH: no cervical, " supraclavicular, axillary, or inguinal adenopathy    ASSESSMENT/PLAN:         ICD-10-CM    1. Encounter for routine adult health examination without abnormal findings Z00.00 Lipid panel reflex to direct LDL     Glucose     Hemoglobin   2. Dysmenorrhea N94.6    3. Gastroesophageal reflux disease without esophagitis K21.9 dexlansoprazole (DEXILANT) 60 MG CPDR CR capsule   4. Chronic pain syndrome G89.4 Drug  Screen Comprehensive , Urine with Reported Meds (MedTox) (Pain Care Package)     HYDROcodone-acetaminophen (NORCO) 5-325 MG per tablet     DISCONTINUED: HYDROcodone-acetaminophen (NORCO) 5-325 MG per tablet   5. Chronic bilateral low back pain with left-sided sciatica M54.42 Drug  Screen Comprehensive , Urine with Reported Meds (MedTox) (Pain Care Package)    G89.29 HYDROcodone-acetaminophen (NORCO) 5-325 MG per tablet     DISCONTINUED: HYDROcodone-acetaminophen (NORCO) 5-325 MG per tablet   6. Lumbar disc disease with radiculopathy M51.16 Drug  Screen Comprehensive , Urine with Reported Meds (MedTox) (Pain Care Package)     HYDROcodone-acetaminophen (NORCO) 5-325 MG per tablet     DISCONTINUED: HYDROcodone-acetaminophen (NORCO) 5-325 MG per tablet   7. S/P lumbar fusion Z98.1 Drug  Screen Comprehensive , Urine with Reported Meds (MedTox) (Pain Care Package)     HYDROcodone-acetaminophen (NORCO) 5-325 MG per tablet     DISCONTINUED: HYDROcodone-acetaminophen (NORCO) 5-325 MG per tablet   8. Adjustment disorder with mixed anxiety and depressed mood F43.23    9. Screening for cervical cancer Z12.4 Pap imaged thin layer screen reflex to HPV if ASCUS - recommend age 25 - 29   10. Tobacco use disorder F17.200 TOBACCO CESSATION - FOR HEALTH MAINTENANCE     nicotine (NICODERM CQ) 14 MG/24HR 24 hr patch     nicotine polacrilex (NICORETTE) 2 MG gum     - Discussed nicotine patch with gum for break through cravings, discussed changing habits and setting a quit date     - Regarding pain     Slight increase in pain  "medication, at our last visit I had written for 2 per day with 5 extra for break through pain (so 5 days she could take 3)   - Patient reports uncontrolled pain and difficulty sleeping, can sleep without pain if takes 2, most days taking 1 in AM and 2 in PM, rare days she needs 4 per day, mostly the day after physical therapy   - Will refill, given 70 tablets, 3/day with 10 extra for breakthrough pain   - Update Utox today, patient took Norco & Gabapentin today, took Flexeril last night   - Patient is meeting goals and being compliant, expects that she won't need another refill   - Discussed recheck in 1-2 months or prior to needing a refill if she does need one  - CSA discussed and signed today   - Problem list and overview updated, though patient will likely be off narcotics in the next 1-3 months   - Continue physical therapy   -  reviewed, no concerns, only routine fills from PCP until January 2017, then there are a few fills from her surgeon or covering providers with their group   - Activity level: currently light due to surgery restrictions, but goal to advanced to moderate and then back to full activity, patient also hopes to ride her horse again in September (ok'd by her surgeon)     DIRE score     4/25/2017   Total Score 17           COUNSELING:  Reviewed preventive health counseling, as reflected in patient instructions  Special attention given to:        Regular exercise       Healthy diet/nutrition       Contraception       Family planning       Safe sex practices/STD prevention       Tobacco Cessation         reports that she has been smoking Cigarettes.  She has never used smokeless tobacco.  Tobacco Cessation Action Plan: Pharmacotherapies : Nicotine patch  Estimated body mass index is 28.64 kg/(m^2) as calculated from the following:    Height as of 2/27/17: 5' 1\" (1.549 m).    Weight as of 4/10/17: 151 lb 9.6 oz (68.8 kg).   Weight management plan: Discussed healthy diet and exercise guidelines " and patient will follow up in 12 months in clinic to re-evaluate.   - Limited due to recent back surgery  - Patient going to physical therapy with a plan to slowing increase and get her strength back         Counseling Resources:  ATP IV Guidelines  Pooled Cohorts Equation Calculator  Breast Cancer Risk Calculator  FRAX Risk Assessment  ICSI Preventive Guidelines  Dietary Guidelines for Americans, 2010  USDA's MyPlate  ASA Prophylaxis  Lung CA Screening      In addition to the preventive visit, 15  minutes of the appointment were spent evaluating and developing a treatment plan for her additional concern(s).        Due to language barrier, an  was present during the history-taking and subsequent discussion (and for part of the physical exam) with this patient.    Aura Rosario PA-C  [facility]

## 2017-04-25 ENCOUNTER — OFFICE VISIT (OUTPATIENT)
Dept: FAMILY MEDICINE | Facility: OTHER | Age: 27
End: 2017-04-25
Payer: MEDICARE

## 2017-04-25 ENCOUNTER — MYC MEDICAL ADVICE (OUTPATIENT)
Dept: FAMILY MEDICINE | Facility: OTHER | Age: 27
End: 2017-04-25

## 2017-04-25 ENCOUNTER — TELEPHONE (OUTPATIENT)
Dept: FAMILY MEDICINE | Facility: OTHER | Age: 27
End: 2017-04-25

## 2017-04-25 VITALS
SYSTOLIC BLOOD PRESSURE: 116 MMHG | HEIGHT: 62 IN | TEMPERATURE: 98.8 F | RESPIRATION RATE: 16 BRPM | HEART RATE: 118 BPM | DIASTOLIC BLOOD PRESSURE: 82 MMHG | OXYGEN SATURATION: 100 % | BODY MASS INDEX: 26.87 KG/M2 | WEIGHT: 146 LBS

## 2017-04-25 DIAGNOSIS — M54.42 CHRONIC BILATERAL LOW BACK PAIN WITH LEFT-SIDED SCIATICA: ICD-10-CM

## 2017-04-25 DIAGNOSIS — G89.4 CHRONIC PAIN SYNDROME: ICD-10-CM

## 2017-04-25 DIAGNOSIS — M51.16 LUMBAR DISC DISEASE WITH RADICULOPATHY: ICD-10-CM

## 2017-04-25 DIAGNOSIS — K21.9 GASTROESOPHAGEAL REFLUX DISEASE WITHOUT ESOPHAGITIS: ICD-10-CM

## 2017-04-25 DIAGNOSIS — F17.200 TOBACCO USE DISORDER: ICD-10-CM

## 2017-04-25 DIAGNOSIS — Z11.3 SCREEN FOR STD (SEXUALLY TRANSMITTED DISEASE): ICD-10-CM

## 2017-04-25 DIAGNOSIS — Z98.1 S/P LUMBAR FUSION: ICD-10-CM

## 2017-04-25 DIAGNOSIS — G89.29 CHRONIC BILATERAL LOW BACK PAIN WITH LEFT-SIDED SCIATICA: ICD-10-CM

## 2017-04-25 DIAGNOSIS — Z12.4 SCREENING FOR CERVICAL CANCER: ICD-10-CM

## 2017-04-25 DIAGNOSIS — Z11.3 SCREEN FOR STD (SEXUALLY TRANSMITTED DISEASE): Primary | ICD-10-CM

## 2017-04-25 DIAGNOSIS — F43.23 ADJUSTMENT DISORDER WITH MIXED ANXIETY AND DEPRESSED MOOD: ICD-10-CM

## 2017-04-25 DIAGNOSIS — G89.4 CHRONIC PAIN SYNDROME: Primary | ICD-10-CM

## 2017-04-25 DIAGNOSIS — Z00.00 ENCOUNTER FOR ROUTINE ADULT HEALTH EXAMINATION WITHOUT ABNORMAL FINDINGS: Primary | ICD-10-CM

## 2017-04-25 DIAGNOSIS — N94.6 DYSMENORRHEA: ICD-10-CM

## 2017-04-25 LAB
CHOLEST SERPL-MCNC: 208 MG/DL
GLUCOSE SERPL-MCNC: 132 MG/DL (ref 70–99)
HDLC SERPL-MCNC: 66 MG/DL
HGB BLD-MCNC: 14.8 G/DL (ref 11.7–15.7)
LDLC SERPL CALC-MCNC: 130 MG/DL
NONHDLC SERPL-MCNC: 142 MG/DL
TRIGL SERPL-MCNC: 58 MG/DL

## 2017-04-25 PROCEDURE — 99213 OFFICE O/P EST LOW 20 MIN: CPT | Mod: 25 | Performed by: PHYSICIAN ASSISTANT

## 2017-04-25 PROCEDURE — 82947 ASSAY GLUCOSE BLOOD QUANT: CPT | Performed by: PHYSICIAN ASSISTANT

## 2017-04-25 PROCEDURE — 80061 LIPID PANEL: CPT | Performed by: PHYSICIAN ASSISTANT

## 2017-04-25 PROCEDURE — G0145 SCR C/V CYTO,THINLAYER,RESCR: HCPCS | Performed by: PHYSICIAN ASSISTANT

## 2017-04-25 PROCEDURE — 86696 HERPES SIMPLEX TYPE 2 TEST: CPT | Performed by: PHYSICIAN ASSISTANT

## 2017-04-25 PROCEDURE — 85018 HEMOGLOBIN: CPT | Performed by: PHYSICIAN ASSISTANT

## 2017-04-25 PROCEDURE — 86695 HERPES SIMPLEX TYPE 1 TEST: CPT | Performed by: PHYSICIAN ASSISTANT

## 2017-04-25 PROCEDURE — 36415 COLL VENOUS BLD VENIPUNCTURE: CPT | Performed by: FAMILY MEDICINE

## 2017-04-25 PROCEDURE — 99395 PREV VISIT EST AGE 18-39: CPT | Performed by: PHYSICIAN ASSISTANT

## 2017-04-25 RX ORDER — HYDROCODONE BITARTRATE AND ACETAMINOPHEN 5; 325 MG/1; MG/1
1-2 TABLET ORAL 2 TIMES DAILY PRN
Qty: 70 TABLET | Refills: 0 | Status: SHIPPED | OUTPATIENT
Start: 2017-04-25 | End: 2017-05-08

## 2017-04-25 RX ORDER — DEXLANSOPRAZOLE 60 MG/1
60 CAPSULE, DELAYED RELEASE ORAL DAILY
Qty: 90 CAPSULE | Refills: 3 | Status: SHIPPED | OUTPATIENT
Start: 2017-04-25 | End: 2017-09-07

## 2017-04-25 RX ORDER — HYDROCODONE BITARTRATE AND ACETAMINOPHEN 5; 325 MG/1; MG/1
1-2 TABLET ORAL EVERY 4 HOURS PRN
Qty: 45 TABLET | Refills: 0 | Status: SHIPPED | OUTPATIENT
Start: 2017-04-25 | End: 2017-04-25

## 2017-04-25 RX ORDER — HYDROCODONE BITARTRATE AND ACETAMINOPHEN 5; 325 MG/1; MG/1
1-2 TABLET ORAL EVERY 4 HOURS PRN
Qty: 70 TABLET | Refills: 0 | Status: SHIPPED | OUTPATIENT
Start: 2017-04-25 | End: 2017-04-25

## 2017-04-25 RX ORDER — NICOTINE 21 MG/24HR
1 PATCH, TRANSDERMAL 24 HOURS TRANSDERMAL EVERY 24 HOURS
Qty: 30 PATCH | Refills: 0 | Status: SHIPPED | OUTPATIENT
Start: 2017-04-25 | End: 2017-05-16

## 2017-04-25 ASSESSMENT — PAIN SCALES - GENERAL: PAINLEVEL: MILD PAIN (2)

## 2017-04-25 NOTE — PATIENT INSTRUCTIONS
- Pain recheck if you need a refill of pain medication     - 2 weeks on 14 mg patch, then switch to 7 mg patch     Quit smoking on day 1

## 2017-04-25 NOTE — MR AVS SNAPSHOT
After Visit Summary   4/25/2017    Katy Islas    MRN: [id number]           Patient Information     Date Of Birth          1990        Visit Information        Provider Department      4/25/2017 9:30 AM Aura Rosario PA-C; [facility]        Today's Diagnoses     Encounter for routine adult health examination without abnormal findings    -  1    Dysmenorrhea        Gastroesophageal reflux disease without esophagitis        Chronic pain syndrome        Chronic bilateral low back pain with left-sided sciatica        Lumbar disc disease with radiculopathy        S/P lumbar fusion        Adjustment disorder with mixed anxiety and depressed mood        Screening for cervical cancer        Tobacco use disorder          Care Instructions    - Pain recheck if you need a refill of pain medication     - 2 weeks on 14 mg patch, then switch to 7 mg patch     Quit smoking on day 1             Follow-ups after your visit        Who to contact     If you have questions or need follow up information about today's clinic visit or your schedule please contact [facility] directly at [phone].  Normal or non-critical lab and imaging results will be communicated to you by Kitsy Lanehart, letter or phone within 4 business days after the clinic has received the results. If you do not hear from us within 7 days, please contact the clinic through [employer] or phone. If you have a critical or abnormal lab result, we will notify you by phone as soon as possible.  Submit refill requests through [employer] or call your pharmacy and they will forward the refill request to us. Please allow 3 business days for your refill to be completed.          Additional Information About Your Visit        Kitsy Lanehart Information     [employer] gives you secure access to your electronic health record. If you see a primary care provider, you can also send messages to your care team and make  "appointments. If you have questions, please call your primary care clinic.  If you do not have a primary care provider, please call [phone] and they will assist you.        Care EveryWhere ID     This is your Care EveryWhere ID. This could be used by other organizations to access your [facility] medical records  [id number]        Your Vitals Were     Pulse Temperature Respirations Height Pulse Oximetry BMI (Body Mass Index)    118 98.8  F (37.1  C) (Oral) 16 5' 1.73\" (1.568 m) 100% 26.94 kg/m2       Blood Pressure from Last 3 Encounters:   04/25/17 116/82   04/10/17 122/76   04/04/17 110/84    Weight from Last 3 Encounters:   04/25/17 146 lb (66.2 kg)   04/10/17 151 lb 9.6 oz (68.8 kg)   04/04/17 147 lb (66.7 kg)              We Performed the Following     Asthma Action Plan (AAP)     Drug  Screen Comprehensive , Urine with Reported Meds (MedTox) (Pain Care Package)     Glucose     Hemoglobin     Lipid panel reflex to direct LDL     Pap imaged thin layer screen reflex to HPV if ASCUS - recommend age 25 - 29     TOBACCO CESSATION - FOR HEALTH MAINTENANCE          Today's Medication Changes          These changes are accurate as of: 4/25/17 10:04 AM.  If you have any questions, ask your nurse or doctor.               Start taking these medicines.        Dose/Directions    HYDROcodone-acetaminophen 5-325 MG per tablet   Commonly known as:  NORCO   Used for:  Chronic pain syndrome, Chronic bilateral low back pain with left-sided sciatica, Lumbar disc disease with radiculopathy, S/P lumbar fusion   Started by:  Aura Rosario PA-C        Dose:  1-2 tablet   Take 1-2 tablets by mouth every 4 hours as needed for moderate to severe pain maximum 2 tablet(s) per day   Quantity:  70 tablet   Refills:  0       nicotine 14 MG/24HR 24 hr patch   Commonly known as:  NICODERM CQ   Used for:  Tobacco use disorder   Started by:  Aura Rosario PA-C        Dose:  1 patch   Place 1 patch onto the " skin every 24 hours   Quantity:  30 patch   Refills:  0       nicotine polacrilex 2 MG gum   Commonly known as:  NICORETTE   Used for:  Tobacco use disorder   Started by:  Aura Rosario PA-C        Dose:  2 mg   Place 1 each (2 mg) inside cheek as needed for smoking cessation   Quantity:  30 tablet   Refills:  1            Where to get your medicines      These medications were sent to [facility] - [place], [place] - [address]  [address], [facility] [address]     Phone:  [phone]     dexlansoprazole 60 MG Cpdr CR capsule    nicotine 14 MG/24HR 24 hr patch    nicotine polacrilex 2 MG gum         Some of these will need a paper prescription and others can be bought over the counter.  Ask your nurse if you have questions.     Bring a paper prescription for each of these medications     HYDROcodone-acetaminophen 5-325 MG per tablet                Primary Care Provider Office Phone # Fax #    Aura Rosario PA-C [phone]       [facility] [address]  [facility] [address]        Thank you!     Thank you for choosing [facility]  for your care. Our goal is always to provide you with excellent care. Hearing back from our patients is one way we can continue to improve our services. Please take a few minutes to complete the written survey that you may receive in the mail after your visit with us. Thank you!             Your Updated Medication List - Protect others around you: Learn how to safely use, store and throw away your medicines at www.disposemymeds.org.          This list is accurate as of: 4/25/17 10:04 AM.  Always use your most recent med list.                   Brand Name Dispense Instructions for use    ALLEGRA 180 MG tablet   Generic drug:  fexofenadine      Take 180 mg by mouth daily Reported on 3/28/2017       ARIPiprazole 2 MG tablet    ABILIFY    90 tablet    Take 1 tablet (2 mg) by mouth At  Bedtime       cloNIDine 0.1 MG tablet    CATAPRES    30 tablet    Take 1 tablet (0.1 mg) by mouth 2 times daily       cyclobenzaprine 10 MG tablet    FLEXERIL    60 tablet    Take 1 tablet (10 mg) by mouth 2 times daily as needed for muscle spasms or other (back pain)       dexlansoprazole 60 MG Cpdr CR capsule    DEXILANT    90 capsule    Take 1 capsule (60 mg) by mouth daily       dicyclomine 20 MG tablet    BENTYL    30 tablet    Take 1 tablet (20 mg) by mouth 4 times daily as needed (ABDOMINAL CRAMPING) AS NEEDED FOR CRAMPING       DULoxetine 60 MG EC capsule    CYMBALTA    90 capsule    Take 1 capsule (60 mg) by mouth daily       EPINEPHrine 0.3 MG/0.3ML injection     0.6 mL    Inject 0.3 mLs (0.3 mg) into the muscle once as needed for anaphylaxis       FLEX-A-MIN JOINT FLEX PO      Take by mouth daily Reported on 3/28/2017       fluticasone 50 MCG/ACT spray    FLONASE    1 Bottle    Spray 2 sprays into both nostrils daily       fluticasone-salmeterol 100-50 MCG/DOSE diskus inhaler    ADVAIR    1 Inhaler    Inhale 1 puff into the lungs 2 times daily       gabapentin 300 MG capsule    NEURONTIN    270 capsule    Take 1 capsule (300 mg) by mouth 3 times daily       HYDROcodone-acetaminophen 5-325 MG per tablet    NORCO    70 tablet    Take 1-2 tablets by mouth every 4 hours as needed for moderate to severe pain maximum 2 tablet(s) per day       hydrocortisone 2.5 % cream    ANUSOL-HC    30 g    Place rectally 2 times daily as needed for hemorrhoids       lidocaine 5 % Patch    LIDODERM    30 patch    Apply up to 3 patches to painful area at once for up to 12 h within a 24 h period.  Remove after 12 hours.       meclizine 12.5 MG tablet    ANTIVERT    30 tablet    Take 1 tablet (12.5 mg) by mouth 4 times daily as needed for dizziness       medical cannabis inhalation (Patient's own supply.  Not a prescription)      Inhale into the lungs 3 times daily as needed Reported on 3/28/2017       Multi-vitamin Tabs tablet    Generic drug:  multivitamin, therapeutic with minerals      Take 1 tablet by mouth daily Reported on 3/28/2017       nicotine 14 MG/24HR 24 hr patch    NICODERM CQ    30 patch    Place 1 patch onto the skin every 24 hours       nicotine polacrilex 2 MG gum    NICORETTE    30 tablet    Place 1 each (2 mg) inside cheek as needed for smoking cessation       ondansetron 4 MG ODT tab    ZOFRAN ODT    30 tablet    Take 1-2 tablets (4-8 mg) by mouth every 6 hours as needed for nausea Take 1-2 tablets by mouth as needed for nausea       ondansetron 4 MG tablet    ZOFRAN    30 tablet    Take 1 tablet (4 mg) by mouth every 8 hours as needed for nausea       * order for DME     1 Units    Equipment being ordered: Nebulizer       * order for DME     1 each    Equipment being ordered: Walker () Treatment Diagnosis: S/P SPINE SURGERY       * order for DME     1 each    Equipment being ordered: Walker Wheels () and Walker () Treatment Diagnosis: difficulty walking       promethazine 25 MG tablet    PHENERGAN    60 tablet    Take 1 tablet (25 mg) by mouth every 6 hours as needed for nausea       senna-docusate 8.6-50 MG per tablet    SENOKOT-S;PERICOLACE    60 tablet    Take 1 tablet by mouth 2 times daily as needed for constipation       sucralfate 1 GM tablet    CARAFATE     Take by mouth 4 times daily Reported on 3/28/2017       SUMAtriptan 50 MG tablet    IMITREX    18 tablet    Take 1 tablet (50 mg) by mouth at onset of headache for migraine May repeat in 2 hours. Max 8 tablets/24 hours.       Vitamin D-3 5000 UNITS Tabs      Take by mouth daily Reported on 3/28/2017       * Notice:  This list has 3 medication(s) that are the same as other medications prescribed for you. Read the directions carefully, and ask your doctor or other care provider to review them with you.

## 2017-04-25 NOTE — PROGRESS NOTES
Savana Burns    Your lab results show slightly borderline high cholesterol. I will expect this to improve once you are able to exercise again. For now, make sure you make low-fat food choices. Glucose was high, but if you had eaten anything this morning or put coffee in your sugar this would be a normal number. Hemoglobin was normal.     The results are attached for your review.       Zach Rosario PA-C

## 2017-04-25 NOTE — LETTER
[facility]    04/25/17    Patient: Katy Islas  YOB: 1990  Medical Record Number: [id number]                                                                  Controlled Substance Agreement  I understand that my care provider has prescribed controlled substances (narcotics, tranquilizers, and/or stimulants) to help manage my condition(s).  I am taking this medicine to help me function or work.  I know that this is strong medicine.  It could have serious side effects and even cause a dependency on the drug.  If I stop these medicines suddenly, I could have severe withdrawal symptoms.    The risks, benefits, and side effects of these medication(s) were explained to me.  I agree that:  1. I will take part in other treatments as advised by my provider.  This may be psychiatry or counseling, physical therapy, behavioral therapy, group treatment, or a referral to a pain clinic.  I will reduce or stop my medicine when my provider tells me to do so.   2. I will take my medicines as prescribed.  I will not change the dose or schedule unless my provider tells me to.  There will be no refills if I  run out early.   I may be contacted at any time without warning and asked to complete a drug test or pill count.   3. I will keep all my appointments at the clinic.  If I miss appointments or fail to follow instructions, my provider may stop my medicine.  4. I will not ask other providers to prescribe controlled substances. And I will not accept controlled substances from other people. If I need another prescribed controlled substance for a new reason, I will notify my provider within one business day.  5. If I enroll in the [place] Medical Marijuana program, I will tell my provider.  I will also sign an agreement to share my medical records with my provider.  6. I will use one pharmacy to fill all of my controlled substance prescriptions.  If my prescription is mailed to my pharmacy, it may take  5 to 7 days for my medicine to be ready.  7. I understand that my provider, clinic care team, and pharmacy can track controlled substance prescriptions from other providers through a central database (prescription monitoring program).  8. I will bring in my list of medications (or my medicine bottles) each time I come to the clinic.  REV-  04/2016                                                                                                                                            Page 1 of 2      [facility]    04/25/17    Patient: Katy Islas  YOB: 1990  Medical Record Number: [id number]    9. Refills of controlled substances will be made only during office hours.  It is up to me to make sure that I do not run out of my medicines on weekends or holidays.    10. I am responsible for my prescriptions.  If the medicine is lost or stolen, it will not be replaced.   I also agree not to share these medicines with anyone.  11. I agree to not use ANY illegal or recreational drugs.  This includes marijuana, cocaine, bath salts or other drugs.  I agree not to use alcohol unless my provider says I may.  I agree to give urine samples whenever asked.  If I fail to give a urine sample, the provider may stop my medicine.     12. I will tell my nurse or provider right away if I become pregnant or have a new medical problem treated outside of [facility].  13. I understand that this medicine can affect my thinking and judgment.  It may be unsafe for me to drive, use machinery and do dangerous tasks.  I will not do any of these things until I know how the medicine affects me.  If my dose changes, I will wait to see how it affects me.  I will contact my provider if I have concerns about medicine side effects.  I understand that if I do not follow any of the conditions above, my prescriptions or treatment may be stopped.    I agree that my provider, clinic care team, and pharmacy may work with  any city, state or federal law enforcement agency that investigates the misuse, sale, or other diversion of my controlled medicine. I will allow my provider to discuss my care with or share a copy of this agreement with any other treating provider, pharmacy or emergency room where I receive care.  I agree to give up (waive) any right of privacy or confidentiality with respect to these authorizations.   I have read this agreement and have asked questions about anything I did not understand.   ___________________________________    ___________________________  Patient Signature                                                           Date and Time  ___________________________________     ____________________________  Aura Rosario PA-C  REV-  04/2016                                                                                                                                                                 Page 2 of 2  Opioid Pain Medicines (also known as Narcotics)  What You Need to Know      What are opioids?   Opioids are pain medicines that must be prescribed by a doctor. Examples are:     morphine (MS Contin, Amie)    oxycodone (Oxycontin)    oxycodone and acetaminophen (Percocet)    hydrocodone and acetaminophen (Vicodin, Norco)     fentanyl patch (Duragesic)     hydromorphone (Dilaudid)     methadone     What do opioids do well?   Opioids are best for short-term pain after a surgery or injury. They also work well for cancer pain. Unlike other pain medicines, they do not cause liver or kidney failure or ulcers. They may help some people with long-lasting (chronic) pain.     What do opioids NOT do well?   Opioids never get rid of pain entirely, and they do not work well for most patients with chronic pain. Opioids do not reduce swelling, one of the causes of pain. They also don t work well for nerve pain.     Side effects  Talk to your doctor before you start or decide to keep taking one of  these medicines. Side effects include:    Lowers your breathing rate enough that it could cause death    Death due to taking more than the prescribed dose    Serious lifelong opioid use      Dependence is not the same as addiction. Addiction is when people keep using a substance that harms their body, their mind or their relations with others. If you have a history of drug or alcohol abuse, taking opioids can cause a relapse.  Over time, opioids don t work as well. Most people will need higher and higher doses. The higher the dose, the more serious the side effects. We don t know the long-term effects of opioids.   People who have used opioids for a long time have a lower quality of life, worse depression, higher levels of pain and more visits to doctors.  Overdose from prescription drugs is the second leading cause of death in the [place] The risk of overdose rises when opioids are taken with other drugs such as:    Medicines used for anxiety and panic attacks (such as lorazepam, alprazolam, clonazepam    Other sedatives    Alcohol    Illegal drugs such as heroin  Never share your opioids with others. Be sure to store opioids in a secure place, locked if possible.Young children can easily swallow them and overdose.     Are there other ways to manage pain?  Ways to help reduce pain:    Exercise every day.    Treat health problems that may be causing pain.    Treat mental health problems like depression and anxiety.     Worse depression symptoms; Less pleasure in things you usually enjoy    Feeling tired or sluggish    Slower thoughts or cloudy thinking    Being more sensitive to pain over time; Pain is harder to control.    Trouble sleeping or restless sleep    Changes in hormone levels (for example, less testosterone).     Changes in sex drive or ability to have sex    Long lasting nausea and constipation    Trouble breathing while asleep; This is worse with lung problems like COPD or sleep apnea.    Unsafe  driving    Getting sick more often    Itching    Feeling dizzy    Dry mouth    Sweating    Trouble emptying the bladder (peeing). This is worse if you have an enlarged prostate or get urinary tract infections (UTIs).    What else should I know about opioids?  When someone takes opioids for too long or too often, they become dependent. This means that if you stop or reduce the medicine too quickly, you will have withdrawal symptoms.          Practice good sleep habits.  Try to go to bed and get up at the same time every day.    Stop smoking.  Tobacco use can make pain worse.    Do things that you enjoy.    Find a way to work through pain without drugs.  Try deep breathing, meditation, visual imagery and aromatherapy.    Ask your doctor to help you create a plan to manage your pain.

## 2017-04-25 NOTE — NURSING NOTE
"Chief Complaint   Patient presents with     Physical     last 2014?     Panel Management     pap, tobacco cessation, lipid, aap, uds       Initial /82 (BP Location: Left arm, Patient Position: Chair, Cuff Size: Adult Regular)  Pulse 118  Temp 98.8  F (37.1  C) (Oral)  Resp 16  Ht 5' 1.73\" (1.568 m)  Wt 146 lb (66.2 kg)  SpO2 100%  BMI 26.94 kg/m2 Estimated body mass index is 26.94 kg/(m^2) as calculated from the following:    Height as of this encounter: 5' 1.73\" (1.568 m).    Weight as of this encounter: 146 lb (66.2 kg).  Medication Reconciliation: complete  "

## 2017-04-26 NOTE — TELEPHONE ENCOUNTER
Provider Chronic Pain Panel Management Review      Patient has the following on her problem list: Chronic back pain, however patient had fusion 2/23/17 and is now in the process of weaning off all pain medications, anticipating today last refill or only 1 further refill   Summary:  Problem list complete: YES  Problem list overview complete: YES  CSA complete: YES       CSA Details: Norco 5-325 mg, 70/1 month    review: YES- 4/25/17        Details: See note from today   UTox complete: YES      Utox review: Awaiting results from today     Previous providers: Many    DIRE:     4/25/2017   Total Score 17         Score 7-13: Not a suitable candidate for long-term opioid analgesia  Score 14-21: May be a good candidate for long-term opioid analgesia    Copyright 2013 Pravin Santos MD, The DIRE Score: Predicting Outcomes of Opioid Prescribing for Chronic Pain. The Journal of Pain. 7(9) (September), 2006:671-681      Patient is due/failing the following: None                                                                                       Zach Rosario PA-C  [facility]

## 2017-04-27 LAB
COPATH REPORT: NORMAL
HSV1 IGG SERPL QL IA: ABNORMAL AI (ref 0–0.8)
HSV2 IGG SERPL QL IA: ABNORMAL AI (ref 0–0.8)
PAP: NORMAL

## 2017-04-27 NOTE — PROGRESS NOTES
Savana Burns    Your Herpes results were normal. 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 HSV 2 is associated with genital herpes and this was negative.     The results are attached for your review.       Zach Rsoario PA-C

## 2017-05-08 ENCOUNTER — MYC MEDICAL ADVICE (OUTPATIENT)
Dept: FAMILY MEDICINE | Facility: OTHER | Age: 27
End: 2017-05-08

## 2017-05-08 ENCOUNTER — OFFICE VISIT (OUTPATIENT)
Dept: FAMILY MEDICINE | Facility: OTHER | Age: 27
End: 2017-05-08
Payer: MEDICARE

## 2017-05-08 VITALS
WEIGHT: 148 LBS | DIASTOLIC BLOOD PRESSURE: 72 MMHG | HEART RATE: 100 BPM | RESPIRATION RATE: 18 BRPM | BODY MASS INDEX: 27.23 KG/M2 | TEMPERATURE: 98.8 F | HEIGHT: 62 IN | SYSTOLIC BLOOD PRESSURE: 118 MMHG

## 2017-05-08 DIAGNOSIS — G89.29 CHRONIC BILATERAL LOW BACK PAIN WITH LEFT-SIDED SCIATICA: ICD-10-CM

## 2017-05-08 DIAGNOSIS — M51.16 LUMBAR DISC DISEASE WITH RADICULOPATHY: Primary | ICD-10-CM

## 2017-05-08 DIAGNOSIS — Z98.1 S/P LUMBAR FUSION: ICD-10-CM

## 2017-05-08 DIAGNOSIS — M54.42 CHRONIC BILATERAL LOW BACK PAIN WITH LEFT-SIDED SCIATICA: ICD-10-CM

## 2017-05-08 DIAGNOSIS — G89.4 CHRONIC PAIN SYNDROME: ICD-10-CM

## 2017-05-08 DIAGNOSIS — Z87.19 HX OF CROHN'S DISEASE: ICD-10-CM

## 2017-05-08 DIAGNOSIS — G43.709 CHRONIC MIGRAINE WITHOUT AURA WITHOUT STATUS MIGRAINOSUS, NOT INTRACTABLE: ICD-10-CM

## 2017-05-08 PROCEDURE — 99214 OFFICE O/P EST MOD 30 MIN: CPT | Performed by: PHYSICIAN ASSISTANT

## 2017-05-08 RX ORDER — DICYCLOMINE HCL 20 MG
20 TABLET ORAL 4 TIMES DAILY PRN
Qty: 30 TABLET | Refills: 3 | Status: SHIPPED | OUTPATIENT
Start: 2017-05-08 | End: 2017-06-30

## 2017-05-08 RX ORDER — DIAZEPAM 2 MG
2 TABLET ORAL EVERY 12 HOURS PRN
Qty: 4 TABLET | Refills: 0 | Status: SHIPPED | OUTPATIENT
Start: 2017-05-08 | End: 2017-05-16

## 2017-05-08 RX ORDER — HYDROCODONE BITARTRATE AND ACETAMINOPHEN 5; 325 MG/1; MG/1
1-2 TABLET ORAL 2 TIMES DAILY PRN
Qty: 84 TABLET | Refills: 0 | Status: SHIPPED | OUTPATIENT
Start: 2017-05-08 | End: 2017-05-22

## 2017-05-08 RX ORDER — ONDANSETRON 4 MG/1
4 TABLET, FILM COATED ORAL EVERY 8 HOURS PRN
Qty: 30 TABLET | Refills: 11 | Status: SHIPPED | OUTPATIENT
Start: 2017-05-08 | End: 2017-05-16

## 2017-05-08 ASSESSMENT — PAIN SCALES - GENERAL: PAINLEVEL: MODERATE PAIN (5)

## 2017-05-08 NOTE — TELEPHONE ENCOUNTER
Left rx for her for #4 tablets of Valium, should use with caution with other medications as can make her very tired.    Zach Rosario PA-C   [place]  [place]

## 2017-05-08 NOTE — TELEPHONE ENCOUNTER
Responded via [employer]. Rx brought to  . Informed pt she can either  Rx or send [employer] message to let us know where she would like it faxed to.

## 2017-05-08 NOTE — MR AVS SNAPSHOT
After Visit Summary   5/8/2017    Katy Islas    MRN: [id number]           Patient Information     Date Of Birth          1990        Visit Information        Provider Department      5/8/2017 2:00 PM Aura Rosario PA-C [facility]        Today's Diagnoses     Lumbar disc disease with radiculopathy    -  1    S/P lumbar fusion        Chronic bilateral low back pain with left-sided sciatica        Chronic migraine without aura without status migrainosus, not intractable        Hx of Crohn's disease        Chronic pain syndrome          Care Instructions    - Recheck 2 weeks         Follow-ups after your visit        Your next 10 appointments already scheduled     May 08, 2017  2:00 PM CDT   Office Visit with Aura Rosario PA-C   [facility] ([facility]    [address] [place] [id number]   [phone]           Bring a current list of meds and any records pertaining to this visit.  For Physicals, please bring immunization records and any forms needing to be filled out.  Please arrive 10 minutes early to complete paperwork.              Who to contact     If you have questions or need follow up information about today's clinic visit or your schedule please contact [facility] directly at [phone].  Normal or non-critical lab and imaging results will be communicated to you by MyChart, letter or phone within 4 business days after the clinic has received the results. If you do not hear from us within 7 days, please contact the clinic through MyChart or phone. If you have a critical or abnormal lab result, we will notify you by phone as soon as possible.  Submit refill requests through [employer] or call your pharmacy and they will forward the refill request to us. Please allow 3 business days for your refill to be completed.          Additional Information About Your Visit       "  spotfluxhart Information     [employer] gives you secure access to your electronic health record. If you see a primary care provider, you can also send messages to your care team and make appointments. If you have questions, please call your primary care clinic.  If you do not have a primary care provider, please call [phone] and they will assist you.        Care EveryWhere ID     This is your Care EveryWhere ID. This could be used by other organizations to access your [place] medical records  [id number]        Your Vitals Were     Pulse Temperature Respirations Height BMI (Body Mass Index)       100 98.8  F (37.1  C) (Oral) 18 5' 1.73\" (1.568 m) 27.31 kg/m2        Blood Pressure from Last 3 Encounters:   05/08/17 118/72   04/25/17 116/82   04/10/17 122/76    Weight from Last 3 Encounters:   05/08/17 148 lb (67.1 kg)   04/25/17 146 lb (66.2 kg)   04/10/17 151 lb 9.6 oz (68.8 kg)              Today, you had the following     No orders found for display         Today's Medication Changes          These changes are accurate as of: 5/8/17  1:59 PM.  If you have any questions, ask your nurse or doctor.               These medicines have changed or have updated prescriptions.        Dose/Directions    HYDROcodone-acetaminophen 5-325 MG per tablet   Commonly known as:  NORCO   This may have changed:  additional instructions   Used for:  Chronic pain syndrome, Chronic bilateral low back pain with left-sided sciatica, Lumbar disc disease with radiculopathy, S/P lumbar fusion   Changed by:  Aura Rosario PA-C        Dose:  1-2 tablet   Take 1-2 tablets by mouth 2 times daily as needed for moderate to severe pain maximum 6 tablet(s) per day   Quantity:  84 tablet   Refills:  0            Where to get your medicines      These medications were sent to [facility] [place], [place] - [address]  [address] [address]     Phone:  [phone]     dicyclomine 20 MG tablet    " ondansetron 4 MG tablet         Some of these will need a paper prescription and others can be bought over the counter.  Ask your nurse if you have questions.     Bring a paper prescription for each of these medications     HYDROcodone-acetaminophen 5-325 MG per tablet                Primary Care Provider Office Phone # Fax #    Aura EMILY Rosario PA-C [phone]       [facility] [address]  [facility] [address]        Thank you!     Thank you for choosing [facility]  for your care. Our goal is always to provide you with excellent care. Hearing back from our patients is one way we can continue to improve our services. Please take a few minutes to complete the written survey that you may receive in the mail after your visit with us. Thank you!             Your Updated Medication List - Protect others around you: Learn how to safely use, store and throw away your medicines at www.disposemymeds.org.          This list is accurate as of: 5/8/17  1:59 PM.  Always use your most recent med list.                   Brand Name Dispense Instructions for use    ALLEGRA 180 MG tablet   Generic drug:  fexofenadine      Take 180 mg by mouth daily Reported on 3/28/2017       ARIPiprazole 2 MG tablet    ABILIFY    90 tablet    Take 1 tablet (2 mg) by mouth At Bedtime       cloNIDine 0.1 MG tablet    CATAPRES    30 tablet    Take 1 tablet (0.1 mg) by mouth 2 times daily       cyclobenzaprine 10 MG tablet    FLEXERIL    60 tablet    Take 1 tablet (10 mg) by mouth 2 times daily as needed for muscle spasms or other (back pain)       dexlansoprazole 60 MG Cpdr CR capsule    DEXILANT    90 capsule    Take 1 capsule (60 mg) by mouth daily       dicyclomine 20 MG tablet    BENTYL    30 tablet    Take 1 tablet (20 mg) by mouth 4 times daily as needed (ABDOMINAL CRAMPING) AS NEEDED FOR CRAMPING       DULoxetine 60 MG EC capsule    CYMBALTA    90 capsule    Take 1 capsule (60  mg) by mouth daily       EPINEPHrine 0.3 MG/0.3ML injection     0.6 mL    Inject 0.3 mLs (0.3 mg) into the muscle once as needed for anaphylaxis       FLEX-A-MIN JOINT FLEX PO      Take by mouth daily Reported on 3/28/2017       fluticasone 50 MCG/ACT spray    FLONASE    1 Bottle    Spray 2 sprays into both nostrils daily       fluticasone-salmeterol 100-50 MCG/DOSE diskus inhaler    ADVAIR    1 Inhaler    Inhale 1 puff into the lungs 2 times daily       gabapentin 300 MG capsule    NEURONTIN    270 capsule    Take 1 capsule (300 mg) by mouth 3 times daily       HYDROcodone-acetaminophen 5-325 MG per tablet    NORCO    84 tablet    Take 1-2 tablets by mouth 2 times daily as needed for moderate to severe pain maximum 6 tablet(s) per day       hydrocortisone 2.5 % cream    ANUSOL-HC    30 g    Place rectally 2 times daily as needed for hemorrhoids       lidocaine 5 % Patch    LIDODERM    30 patch    Apply up to 3 patches to painful area at once for up to 12 h within a 24 h period.  Remove after 12 hours.       meclizine 12.5 MG tablet    ANTIVERT    30 tablet    Take 1 tablet (12.5 mg) by mouth 4 times daily as needed for dizziness       medical cannabis inhalation (Patient's own supply.  Not a prescription)      Inhale into the lungs 3 times daily as needed Reported on 3/28/2017       Multi-vitamin Tabs tablet   Generic drug:  multivitamin, therapeutic with minerals      Take 1 tablet by mouth daily Reported on 3/28/2017       nicotine 14 MG/24HR 24 hr patch    NICODERM CQ    30 patch    Place 1 patch onto the skin every 24 hours       nicotine polacrilex 2 MG gum    NICORETTE    30 tablet    Place 1 each (2 mg) inside cheek as needed for smoking cessation       ondansetron 4 MG ODT tab    ZOFRAN ODT    30 tablet    Take 1-2 tablets (4-8 mg) by mouth every 6 hours as needed for nausea Take 1-2 tablets by mouth as needed for nausea       ondansetron 4 MG tablet    ZOFRAN    30 tablet    Take 1 tablet (4 mg) by mouth  every 8 hours as needed for nausea       * order for DME     1 Units    Equipment being ordered: Nebulizer       * order for DME     1 each    Equipment being ordered: Walker () Treatment Diagnosis: S/P SPINE SURGERY       * order for DME     1 each    Equipment being ordered: Walker Wheels () and Walker () Treatment Diagnosis: difficulty walking       promethazine 25 MG tablet    PHENERGAN    60 tablet    Take 1 tablet (25 mg) by mouth every 6 hours as needed for nausea       senna-docusate 8.6-50 MG per tablet    SENOKOT-S;PERICOLACE    60 tablet    Take 1 tablet by mouth 2 times daily as needed for constipation       sucralfate 1 GM tablet    CARAFATE     Take by mouth 4 times daily Reported on 3/28/2017       SUMAtriptan 50 MG tablet    IMITREX    18 tablet    Take 1 tablet (50 mg) by mouth at onset of headache for migraine May repeat in 2 hours. Max 8 tablets/24 hours.       Vitamin D-3 5000 UNITS Tabs      Take by mouth daily Reported on 3/28/2017       * Notice:  This list has 3 medication(s) that are the same as other medications prescribed for you. Read the directions carefully, and ask your doctor or other care provider to review them with you.

## 2017-05-08 NOTE — PROGRESS NOTES
SUBJECTIVE:                                                    Katy Islas is a 27 year old female who presents to clinic today for the following health issues:      HPI    Back Pain Follow Up       Description:   Location of pain:  left  Character of pain: muscle spasm and constant  Pain radiation: radiates into the left buttocks and down her leg  Since last visit, pain is:  worsened  New numbness or weakness in legs, not attributed to pain:  no     Intensity: Currently 5/10    History:   Pain interferes with job: YES  Therapies tried without relief: n/a  Therapies tried with relief: Norco helps the best, Flexeril, heat, and ice     Accompanying Signs & Symptoms:  Risk of Fracture:  None  Risk of Cauda Equina:  None  Risk of Infection:  None  Risk of Cancer:  None    - Urine tox was not completed 4/25/17    - Was in [place] for a week on vacation, thinks she lifted something too heavy  - Was doing well on 3-4 Norco per day, but then this happened and has increased to 5 or 6  - Wonders if can take 2 Flexeril at night, does make her drowsy   - Has physical therapy twice a week (tomorrow)   - Wearing her brace some helps as well  - Also doing hot bath at night            Plan from 4/25/17 - Regarding pain  - Slight increase in pain medication, at our last visit I had written for 2 per day with 5 extra for break through pain (so 5 days she could take 3)   - Patient reports uncontrolled pain and difficulty sleeping, can sleep without pain if takes 2, most days taking 1 in AM and 2 in PM, rare days she needs 4 per day, mostly the day after physical therapy   - Will refill, given 70 tablets, 3/day with 10 extra for breakthrough pain   - Update Utox today, patient took Norco & Gabapentin today, took Flexeril last night   - Patient is meeting goals and being compliant, expects that she won't need another refill   - Discussed recheck in 1-2 months or prior to needing a refill if she does need one  - CSA discussed and  "signed today   - Problem list and overview updated, though patient will likely be off narcotics in the next 1-3 months   - Continue physical therapy   -  reviewed, no concerns, only routine fills from PCP until January 2017, then there are a few fills from her surgeon or covering providers with their group   - Activity level: currently light due to surgery restrictions, but goal to advanced to moderate and then back to full activity, patient also hopes to ride her horse again in September (ok'd by her surgeon)       Problem list and histories reviewed & adjusted, as indicated.  Additional history: as documented    ROS:  Constitutional, HEENT, cardiovascular, pulmonary, gi and gu systems are negative, except as otherwise noted.    OBJECTIVE:                                                    /72 (BP Location: Right arm, Patient Position: Chair, Cuff Size: Adult Regular)  Pulse 100  Temp 98.8  F (37.1  C) (Oral)  Resp 18  Ht 5' 1.73\" (1.568 m)  Wt 148 lb (67.1 kg)  BMI 27.31 kg/m2  Body mass index is 27.31 kg/(m^2).  GENERAL APPEARANCE: healthy, alert and no distress  EYES: Eyes grossly normal to inspection, PERRLA, conjunctivae and sclerae without injection or discharge, EOM intact   MS: No musculoskeletal defects are noted and gait is age appropriate without ataxia   SKIN: No suspicious lesions or rashes, hydration status appears adeuqate with normal skin turgor   NEURO: Strength 4+ bilateral lower extremities, sensation intact in distal bilateral lower extremities, mentation- intact, speech- normal  BACK: No CVA tenderness, left paralumbar tenderness with spasm, lower midline tenderness, decreased ROM   PSYCH: Alert and oriented x3; speech- coherent , normal rate and volume; able to articulate logical thoughts, able to abstract reason, no tangential thoughts, no hallucinations or delusions, mentation appears normal, Mood is euthymic. Affect is appropriate for this mood state and bright. Thought " content is free of suicidal ideation, hallucinations, and delusions. Dress is adequate and upkept. Eye contact is good during conversation.       Diagnostic Test Results:  none      ASSESSMENT/PLAN:                                                        ICD-10-CM    1. Lumbar disc disease with radiculopathy M51.16 HYDROcodone-acetaminophen (NORCO) 5-325 MG per tablet   2. S/P lumbar fusion Z98.1 HYDROcodone-acetaminophen (NORCO) 5-325 MG per tablet   3. Chronic bilateral low back pain with left-sided sciatica M54.42 HYDROcodone-acetaminophen (NORCO) 5-325 MG per tablet    G89.29    4. Chronic migraine without aura without status migrainosus, not intractable G43.709 ondansetron (ZOFRAN) 4 MG tablet   5. Hx of Crohn's disease Z87.19 dicyclomine (BENTYL) 20 MG tablet   6. Chronic pain syndrome G89.4 HYDROcodone-acetaminophen (NORCO) 5-325 MG per tablet     - Today's exam was completed with no interpretor as patient was late and interpretor had left, between my basic sign language (finger spelling for medications) and her lip reading (she is verbal as well) we were able to complete exam     - Exacerbation from over stressing her back s/p fusion   - Increase Norco to max 6/day for 2 weeks, at this point we will need to decrease again   - Also allow for her to take 2 Flexeril at night   - Reviewed both use and side effects, should try to take <6 most days of her Norco  - Continue with physical therapy and conservative measures   - Recheck 2 weeks     The patient indicates understanding of these issues and agrees with the plan.    Follow up: 2 weeks, will need to get Utox at that time       Aura Tomlinson-LOVE Moss  [facility]

## 2017-05-08 NOTE — NURSING NOTE
"Chief Complaint   Patient presents with     Spasms       Initial /72 (BP Location: Right arm, Patient Position: Chair, Cuff Size: Adult Regular)  Pulse 100  Temp 98.8  F (37.1  C) (Oral)  Resp 18  Ht 5' 1.73\" (1.568 m)  Wt 148 lb (67.1 kg)  BMI 27.31 kg/m2 Estimated body mass index is 27.31 kg/(m^2) as calculated from the following:    Height as of this encounter: 5' 1.73\" (1.568 m).    Weight as of this encounter: 148 lb (67.1 kg).  Medication Reconciliation: complete    "

## 2017-05-09 NOTE — TELEPHONE ENCOUNTER
I recommend she contact her surgeon to ask about a massage as I am not sure if this would be ok'd or not.     Zach Rosario PA-C   [facility]

## 2017-05-15 ENCOUNTER — MYC MEDICAL ADVICE (OUTPATIENT)
Dept: FAMILY MEDICINE | Facility: OTHER | Age: 27
End: 2017-05-15

## 2017-05-16 ENCOUNTER — OFFICE VISIT (OUTPATIENT)
Dept: FAMILY MEDICINE | Facility: OTHER | Age: 27
End: 2017-05-16
Payer: MEDICARE

## 2017-05-16 VITALS
RESPIRATION RATE: 16 BRPM | TEMPERATURE: 98.5 F | BODY MASS INDEX: 28.01 KG/M2 | OXYGEN SATURATION: 97 % | WEIGHT: 151.8 LBS | SYSTOLIC BLOOD PRESSURE: 122 MMHG | HEART RATE: 85 BPM | DIASTOLIC BLOOD PRESSURE: 78 MMHG

## 2017-05-16 DIAGNOSIS — F33.0 MAJOR DEPRESSIVE DISORDER, RECURRENT EPISODE, MILD (H): ICD-10-CM

## 2017-05-16 DIAGNOSIS — F43.23 ADJUSTMENT DISORDER WITH MIXED ANXIETY AND DEPRESSED MOOD: Primary | ICD-10-CM

## 2017-05-16 DIAGNOSIS — G89.29 CHRONIC BILATERAL LOW BACK PAIN WITH LEFT-SIDED SCIATICA: ICD-10-CM

## 2017-05-16 DIAGNOSIS — M54.42 CHRONIC BILATERAL LOW BACK PAIN WITH LEFT-SIDED SCIATICA: ICD-10-CM

## 2017-05-16 DIAGNOSIS — F41.9 ANXIETY: ICD-10-CM

## 2017-05-16 PROCEDURE — T1013 SIGN LANG/ORAL INTERPRETER: HCPCS | Mod: U3 | Performed by: FAMILY MEDICINE

## 2017-05-16 PROCEDURE — 99214 OFFICE O/P EST MOD 30 MIN: CPT | Performed by: NURSE PRACTITIONER

## 2017-05-16 ASSESSMENT — ANXIETY QUESTIONNAIRES
GAD7 TOTAL SCORE: 15
3. WORRYING TOO MUCH ABOUT DIFFERENT THINGS: MORE THAN HALF THE DAYS
1. FEELING NERVOUS, ANXIOUS, OR ON EDGE: MORE THAN HALF THE DAYS
7. FEELING AFRAID AS IF SOMETHING AWFUL MIGHT HAPPEN: SEVERAL DAYS
5. BEING SO RESTLESS THAT IT IS HARD TO SIT STILL: MORE THAN HALF THE DAYS
2. NOT BEING ABLE TO STOP OR CONTROL WORRYING: MORE THAN HALF THE DAYS
6. BECOMING EASILY ANNOYED OR IRRITABLE: NEARLY EVERY DAY

## 2017-05-16 ASSESSMENT — PATIENT HEALTH QUESTIONNAIRE - PHQ9: 5. POOR APPETITE OR OVEREATING: NEARLY EVERY DAY

## 2017-05-16 ASSESSMENT — PAIN SCALES - GENERAL: PAINLEVEL: MODERATE PAIN (4)

## 2017-05-16 NOTE — NURSING NOTE
"Chief Complaint   Patient presents with     Depression       Initial /78  Pulse 85  Temp 98.5  F (36.9  C) (Temporal)  Resp 16  Wt 151 lb 12.8 oz (68.9 kg)  SpO2 97%  BMI 28.01 kg/m2 Estimated body mass index is 28.01 kg/(m^2) as calculated from the following:    Height as of 5/8/17: 5' 1.73\" (1.568 m).    Weight as of this encounter: 151 lb 12.8 oz (68.9 kg).  Medication Reconciliation: complete  Nandini Peoples CMA (AAMA)    "

## 2017-05-16 NOTE — MR AVS SNAPSHOT
After Visit Summary   5/16/2017    Katy Islas    MRN: [id number]           Patient Information     Date Of Birth          1990        Visit Information        Provider Department      5/16/2017 11:00 AM Shira Faustin Kelly Marie, APRN CNP [facility]        Care Instructions    - Increase abilify to 1.5 tablets at night daily for the two weeks due to your current situation.   - If you develop worsening symptoms, increased fatigue, to be seen immediately for suicidal ideation or intention.   - Follow up with PCP in 1-2 weeks.   - Continue with Flexeril 2 tabs at night for back spasms as prescribed by CDL     NATA Retana CNP            Follow-ups after your visit        Follow-up notes from your care team     Return in about 2 weeks (around 5/30/2017).      Who to contact     If you have questions or need follow up information about today's clinic visit or your schedule please contact [facility] directly at [phone].  Normal or non-critical lab and imaging results will be communicated to you by [employer], letter or phone within 4 business days after the clinic has received the results. If you do not hear from us within 7 days, please contact the clinic through [employer] or phone. If you have a critical or abnormal lab result, we will notify you by phone as soon as possible.  Submit refill requests through [employer] or call your pharmacy and they will forward the refill request to us. Please allow 3 business days for your refill to be completed.          Additional Information About Your Visit        [employer] Information     [employer] gives you secure access to your electronic health record. If you see a primary care provider, you can also send messages to your care team and make appointments. If you have questions, please call your primary care clinic.  If you do not have a primary care provider, please call [phone] and they will assist you.         Care EveryWhere ID     This is your Care EveryWhere ID. This could be used by other organizations to access your [place] medical records  [id number]        Your Vitals Were     Pulse Temperature Respirations Pulse Oximetry BMI (Body Mass Index)       85 98.5  F (36.9  C) (Temporal) 16 97% 28.01 kg/m2        Blood Pressure from Last 3 Encounters:   05/16/17 122/78   05/08/17 118/72   04/25/17 116/82    Weight from Last 3 Encounters:   05/16/17 151 lb 12.8 oz (68.9 kg)   05/08/17 148 lb (67.1 kg)   04/25/17 146 lb (66.2 kg)              Today, you had the following     No orders found for display       Primary Care Provider Office Phone # Fax #    Aura EMILY Rosario PA-C [phone]       [facility] [address]  [facility] [address]        Thank you!     Thank you for choosing [facility]  for your care. Our goal is always to provide you with excellent care. Hearing back from our patients is one way we can continue to improve our services. Please take a few minutes to complete the written survey that you may receive in the mail after your visit with us. Thank you!             Your Updated Medication List - Protect others around you: Learn how to safely use, store and throw away your medicines at www.disposemymeds.org.          This list is accurate as of: 5/16/17 12:10 PM.  Always use your most recent med list.                   Brand Name Dispense Instructions for use    ALLEGRA 180 MG tablet   Generic drug:  fexofenadine      Take 180 mg by mouth daily Reported on 5/16/2017       ARIPiprazole 2 MG tablet    ABILIFY    90 tablet    Take 1 tablet (2 mg) by mouth At Bedtime       cyclobenzaprine 10 MG tablet    FLEXERIL    60 tablet    Take 1 tablet (10 mg) by mouth 2 times daily as needed for muscle spasms or other (back pain)       dexlansoprazole 60 MG Cpdr CR capsule    DEXILANT    90 capsule    Take 1 capsule (60 mg) by mouth daily        dicyclomine 20 MG tablet    BENTYL    30 tablet    Take 1 tablet (20 mg) by mouth 4 times daily as needed (ABDOMINAL CRAMPING) AS NEEDED FOR CRAMPING       DULoxetine 60 MG EC capsule    CYMBALTA    90 capsule    Take 1 capsule (60 mg) by mouth daily       EPINEPHrine 0.3 MG/0.3ML injection     0.6 mL    Inject 0.3 mLs (0.3 mg) into the muscle once as needed for anaphylaxis       FLEX-A-MIN JOINT FLEX PO      Take by mouth daily Reported on 3/28/2017       fluticasone 50 MCG/ACT spray    FLONASE    1 Bottle    Spray 2 sprays into both nostrils daily       fluticasone-salmeterol 100-50 MCG/DOSE diskus inhaler    ADVAIR    1 Inhaler    Inhale 1 puff into the lungs 2 times daily       gabapentin 300 MG capsule    NEURONTIN    270 capsule    Take 1 capsule (300 mg) by mouth 3 times daily       HYDROcodone-acetaminophen 5-325 MG per tablet    NORCO    84 tablet    Take 1-2 tablets by mouth 2 times daily as needed for moderate to severe pain maximum 6 tablet(s) per day       lidocaine 5 % Patch    LIDODERM    30 patch    Apply up to 3 patches to painful area at once for up to 12 h within a 24 h period.  Remove after 12 hours.       meclizine 12.5 MG tablet    ANTIVERT    30 tablet    Take 1 tablet (12.5 mg) by mouth 4 times daily as needed for dizziness       medical cannabis inhalation (Patient's own supply.  Not a prescription)      Inhale into the lungs 3 times daily as needed Reported on 3/28/2017       Multi-vitamin Tabs tablet   Generic drug:  multivitamin, therapeutic with minerals      Take 1 tablet by mouth daily Reported on 3/28/2017       ondansetron 4 MG ODT tab    ZOFRAN ODT    30 tablet    Take 1-2 tablets (4-8 mg) by mouth every 6 hours as needed for nausea Take 1-2 tablets by mouth as needed for nausea       order for DME     1 Units    Equipment being ordered: Nebulizer       promethazine 25 MG tablet    PHENERGAN    60 tablet    Take 1 tablet (25 mg) by mouth every 6 hours as needed for nausea        senna-docusate 8.6-50 MG per tablet    SENOKOT-S;PERICOLACE    60 tablet    Take 1 tablet by mouth 2 times daily as needed for constipation       sucralfate 1 GM tablet    CARAFATE     Take by mouth 4 times daily Reported on 5/16/2017       SUMAtriptan 50 MG tablet    IMITREX    18 tablet    Take 1 tablet (50 mg) by mouth at onset of headache for migraine May repeat in 2 hours. Max 8 tablets/24 hours.       Vitamin D-3 5000 UNITS Tabs      Take by mouth daily Reported on 5/16/2017

## 2017-05-16 NOTE — PATIENT INSTRUCTIONS
- Increase abilify to 1.5 tablets at night daily for the two weeks due to your current situation.   - If you develop worsening symptoms, increased fatigue, to be seen immediately for suicidal ideation or intention.   - Follow up with PCP in 1-2 weeks.   - Continue with Flexeril 2 tabs at night for back spasms as prescribed by CDL     NATA Retana CNP

## 2017-05-16 NOTE — PROGRESS NOTES
"  SUBJECTIVE:                                                    Katy Islas is a 27 year old female who presents to clinic today for the following health issues:    Depression and Anxiety Follow-Up    Status since last visit: Worse    Other associated symptoms:cat is missing, back spasms, moving, stress, more crabbing and irritable    Complicating factors:     Significant life event: Yes-  Moving, cat missing     Current substance abuse: Cannabis- prescription    PHQ-9 SCORE 3/28/2017 4/10/2017 5/16/2017   Total Score - - -   Total Score MyChart - 2 (Minimal depression) -   Total Score 1 - 7     JULIETH-7 SCORE 2/13/2017 3/28/2017 5/16/2017   Total Score - - -   Total Score 2 (minimal anxiety) - -   Total Score - 4 15        PHQ-9  English      PHQ-9   Any Language     GAD7       Amount of exercise or physical activity: 4-5 days/week for an average of 45-60 minutes    Problems taking medications regularly: Yes,  forget    Medication side effects: tired, norco causing headaches    Diet: regular (no restrictions)    Anxiety \" worst it has been in awhile\" Cat is missing and currently moving. May 31st and put things in storage. Very stressful. Unsure where she will live right now, will hear back today if she will get the townhouse they would like to rent. Taking Cymbalta and Abilify for anxiety. She has been on Abilify for a couple of months. Cymbalta been on a for a few years.   Curious about increasing Abilify. She also has BPV (borderline personality disorder). She sees a counselor for her mood disorders every Thursday. Troubles sleeping due to anxiety and back spasms.     Per last visit with CDL 03/28/17  1-4. Mood  - Stable on Abilify and Cymbalta   - OK for refills as needed     Problem list and histories reviewed & adjusted, as indicated.  Additional history: as documented    Current Outpatient Prescriptions   Medication Sig Dispense Refill     dicyclomine (BENTYL) 20 MG tablet Take 1 tablet (20 mg) by mouth 4 " times daily as needed (ABDOMINAL CRAMPING) AS NEEDED FOR CRAMPING 30 tablet 3     HYDROcodone-acetaminophen (NORCO) 5-325 MG per tablet Take 1-2 tablets by mouth 2 times daily as needed for moderate to severe pain maximum 6 tablet(s) per day 84 tablet 0     dexlansoprazole (DEXILANT) 60 MG CPDR CR capsule Take 1 capsule (60 mg) by mouth daily 90 capsule 3     gabapentin (NEURONTIN) 300 MG capsule Take 1 capsule (300 mg) by mouth 3 times daily 270 capsule 1     lidocaine (LIDODERM) 5 % Patch Apply up to 3 patches to painful area at once for up to 12 h within a 24 h period.  Remove after 12 hours. 30 patch 3     cyclobenzaprine (FLEXERIL) 10 MG tablet Take 1 tablet (10 mg) by mouth 2 times daily as needed for muscle spasms or other (back pain) 60 tablet 3     senna-docusate (SENOKOT-S;PERICOLACE) 8.6-50 MG per tablet Take 1 tablet by mouth 2 times daily as needed for constipation 60 tablet 3     SUMAtriptan (IMITREX) 50 MG tablet Take 1 tablet (50 mg) by mouth at onset of headache for migraine May repeat in 2 hours. Max 8 tablets/24 hours. 18 tablet 3     meclizine (ANTIVERT) 12.5 MG tablet Take 1 tablet (12.5 mg) by mouth 4 times daily as needed for dizziness 30 tablet 0     ondansetron (ZOFRAN ODT) 4 MG ODT tab Take 1-2 tablets (4-8 mg) by mouth every 6 hours as needed for nausea Take 1-2 tablets by mouth as needed for nausea 30 tablet 0     promethazine (PHENERGAN) 25 MG tablet Take 1 tablet (25 mg) by mouth every 6 hours as needed for nausea 60 tablet 3     fluticasone (FLONASE) 50 MCG/ACT spray Spray 2 sprays into both nostrils daily 1 Bottle 3     medical cannabis inhalation (Patient's own supply.  Not a prescription) Inhale into the lungs 3 times daily as needed Reported on 3/28/2017       ARIPiprazole (ABILIFY) 2 MG tablet Take 1 tablet (2 mg) by mouth At Bedtime 90 tablet 3     DULoxetine (CYMBALTA) 60 MG EC capsule Take 1 capsule (60 mg) by mouth daily 90 capsule 3     fluticasone-salmeterol (ADVAIR) 100-50  MCG/DOSE diskus inhaler Inhale 1 puff into the lungs 2 times daily 1 Inhaler 5     EPINEPHrine (EPIPEN) 0.3 MG/0.3ML injection Inject 0.3 mLs (0.3 mg) into the muscle once as needed for anaphylaxis 0.6 mL 1     ORDER FOR DME Equipment being ordered: Nebulizer 1 Units 0     Misc Natural Products (FLEX-A-MIN JOINT FLEX PO) Take by mouth daily Reported on 3/28/2017       Multiple Vitamin (MULTI-VITAMIN) per tablet Take 1 tablet by mouth daily Reported on 3/28/2017       diazepam (VALIUM) 2 MG tablet Take 1 tablet (2 mg) by mouth every 12 hours as needed for muscle spasms (Patient not taking: Reported on 5/16/2017) 4 tablet 0     nicotine (NICODERM CQ) 14 MG/24HR 24 hr patch Place 1 patch onto the skin every 24 hours (Patient not taking: Reported on 5/16/2017) 30 patch 0     nicotine polacrilex (NICORETTE) 2 MG gum Place 1 each (2 mg) inside cheek as needed for smoking cessation (Patient not taking: Reported on 5/16/2017) 30 tablet 1     hydrocortisone (ANUSOL-HC) 2.5 % cream Place rectally 2 times daily as needed for hemorrhoids (Patient not taking: Reported on 5/16/2017) 30 g 3     cloNIDine (CATAPRES) 0.1 MG tablet Take 1 tablet (0.1 mg) by mouth 2 times daily (Patient not taking: Reported on 5/16/2017) 30 tablet 1     order for DME Equipment being ordered: Walker ()  Treatment Diagnosis: S/P SPINE SURGERY (Patient not taking: Reported on 5/16/2017) 1 each 0     order for DME Equipment being ordered: Walker Wheels () and Walker ()  Treatment Diagnosis: difficulty walking (Patient not taking: Reported on 5/16/2017) 1 each 0     sucralfate (CARAFATE) 1 GM tablet Take by mouth 4 times daily Reported on 5/16/2017       Cholecalciferol (VITAMIN D-3) 5000 UNITS TABS Take by mouth daily Reported on 5/16/2017       fexofenadine (ALLEGRA) 180 MG tablet Take 180 mg by mouth daily Reported on 5/16/2017         Reviewed and updated as needed this visit by clinical staff  Tobacco  Allergies  Med Hx  Surg Hx   Fam Hx  Soc Hx      Reviewed and updated as needed this visit by Provider       ROS:  C: NEGATIVE for fever, chills, change in weight    OBJECTIVE:                                                    /78  Pulse 85  Temp 98.5  F (36.9  C) (Temporal)  Resp 16  Wt 151 lb 12.8 oz (68.9 kg)  SpO2 97%  BMI 28.01 kg/m2  Body mass index is 28.01 kg/(m^2).  GENERAL: healthy, alert and no distress  PSYCH: mentation appears normal, affect sad.   Diagnostic Test Results:  none      ASSESSMENT/PLAN:                                                      1. Adjustment disorder with mixed anxiety and depressed mood  - Given increase in anxiety and current situation I have advised her to increase her Abilify by 1.5 tablets for a total of 2.5mg. I advised her to let me know how this goes, if not helping we can talk about increase to 2 tablets. I also advised her to follow up with CLD in 2 weeks as she may need early refills (this is ok given increase).   - Continue to see counselor weekly.     2. Anxiety  - As noted above.     3. Major depressive disorder, recurrent episode, mild (H)  -As noted above     4. Chronic bilateral low back pain with left-sided sciatica  - Patient requested for Valium, I do see CDL did a small dose of this.   - At this time I have encouraged patient to continue with her flexeril at night along with the increase in Abilify. I also encouraged her to not do any moving/lifting with her move. She agreed to this.   - Patient is very compliant with her plans based on CDL's note and I do not feel she is drug seeking.  - Follow up as needed.     The patient indicates understanding of these issues and agrees with the plan.      See Patient Instructions    NATA [facility]

## 2017-05-17 ASSESSMENT — ANXIETY QUESTIONNAIRES: GAD7 TOTAL SCORE: 15

## 2017-05-17 ASSESSMENT — PATIENT HEALTH QUESTIONNAIRE - PHQ9: SUM OF ALL RESPONSES TO PHQ QUESTIONS 1-9: 7

## 2017-05-18 ENCOUNTER — MYC MEDICAL ADVICE (OUTPATIENT)
Dept: FAMILY MEDICINE | Facility: OTHER | Age: 27
End: 2017-05-18

## 2017-05-19 NOTE — PROGRESS NOTES
SUBJECTIVE:                                                    Katy Islas is a 27 year old female who presents to clinic today for the following health issues:      HPI    Pt would like to discuss alternative to hydrocodone, states it is making her nauseated and giving her headaches  - Stops for 2-3 days, then will get stomach ache and headache     Would also like to discuss switching migraine medication to maxalt - insurance wouldn't cover before but thinks they will now      Chronic Pain Follow-Up       Type / Location of Pain: Lower left abdomen/Pain in tailbone  Analgesia/pain control:       Recent changes:  unchanged      Overall control: Tolerable with discomfort - flexeril is helping at night with sleep  Activity level/function:      Daily activities:  Can do most things most days, with some rest    Work:  not applicable  Adverse effects:  No  Adherance    Taking medication as directed?  Yes    Participating in other treatments: yes, PT, and exercise at the [facility]  Risk Factors:    Sleep:  Fair    Mood/anxiety:  Worsened - anger and anxiety    Recent family or social stressors:  recent move    Other aggravating factors: prolonged sitting     - Physical therapy going well   - Getting stronger, [facility] every day   - if needed norco during day   - muscle spasms better   - Flexeril at night   - 2 pain medications tablets (norco) TID    - Moving tomorrow to temporary housing until Fall, then needs to find a new place due to mom moving to [place] and tearing down of current home (across from the [facility])      - Increased Abilify 1 week ago, hasn't seemed to help yet but after move expects mood to improve anyway, has 3-4 men helping with move, she is only packing, would like to know what her surgeon recommends for her lifting restrictions, can't find paperwork     - Her cat has been lost and not found yet, making her very sad       PHQ-9 SCORE 3/28/2017 4/10/2017 5/16/2017   Total Score - - -   Total Score MyChart -  2 (Minimal depression) -   Total Score 1 - 7     JULIETH-7 SCORE 3/28/2017 5/16/2017 5/25/2017   Total Score - - -   Total Score - - 0 (minimal anxiety)   Total Score 4 15 15     Encounter-Level CSA - 04/25/2017:                 Controlled Substance Agreement - Scan on 5/1/2017 11:06 AM : CONTROLLED SUBSTANCE AGREEMENT (below)          Encounter-Level CSA - 04/25/2017:                 Controlled Substance Agreement - Scan on 1/26/2015  9:14 AM : Controlled Medication Agreement-01/12/15 (below)                 Problem list and histories reviewed & adjusted, as indicated.  Additional history: as documented      ROS:  Constitutional, HEENT, cardiovascular, pulmonary, gi and gu systems are negative, except as otherwise noted.    OBJECTIVE:                                                    /80 (BP Location: Left arm, Patient Position: Chair, Cuff Size: Adult Regular)  Pulse 96  Temp 98.5  F (36.9  C) (Oral)  Resp 16  Wt 149 lb (67.6 kg)  SpO2 100%  BMI 27.49 kg/m2  Body mass index is 27.49 kg/(m^2).  GENERAL APPEARANCE: healthy, alert and no distress  EYES: Eyes grossly normal to inspection, PERRLA, conjunctivae and sclerae without injection or discharge, EOM intact   MS: No musculoskeletal defects are noted and gait is age appropriate without ataxia   SKIN: No suspicious lesions or rashes, hydration status appears adeuqate with normal skin turgor   NEURO: Deferred   BACK: Deferred   PSYCH: Alert and oriented x3; speech- coherent , normal rate and volume; able to articulate logical thoughts, able to abstract reason, no tangential thoughts, no hallucinations or delusions, mentation appears normal, Mood is euthymic. Affect is appropriate for this mood state and bright. Thought content is free of suicidal ideation, hallucinations, and delusions. Dress is adequate and upkept. Eye contact is good during conversation.       Diagnostic Test Results:  none      ASSESSMENT/PLAN:                                                         ICD-10-CM    1. Migraine with aura and without status migrainosus, not intractable G43.109 rizatriptan (MAXALT-MLT) 5 MG ODT tab   2. Chronic pain syndrome G89.4 Hydrocodone-Ibuprofen 5-200 MG TABS   3. Chronic bilateral low back pain with left-sided sciatica M54.42 Hydrocodone-Ibuprofen 5-200 MG TABS    G89.29    4. Lumbar disc disease with radiculopathy M51.16 Hydrocodone-Ibuprofen 5-200 MG TABS   5. S/P lumbar fusion Z98.1 Hydrocodone-Ibuprofen 5-200 MG TABS   6. Major depressive disorder, recurrent episode, mild (H) F33.0    7. Anxiety F41.9        1. Migraines  - Possibly worse from pain medication  - Imitrex only mildly effective, history of Maxalt working better but issues with insurance coverage   - Will send new rx  - Reviewed use and side effects     2-5. Pain   - [facility], Dr. YOVANI Millan, [phone] (surgery was done at [facility])     Minimally invasive Lumbar fusion on 2/24/17      [email or web address]      I called number above and spoke with RN         No Nsaids 1 month per Dr. Millan protocol       Also gave me address to website for post-op, printed copy for patient      I was not advised on reasonable time for pain to decrease, per surgeon it all depends on the patient, physical therapy, and strength, >6 months is not unreasonable      See patient instructions for lifting restrictions, she is at 3 months out, so needs to be <15 lbs for another month, then can be <25 lbs      - Discussed switching therapies as it appears the APAP is upsetting her stomach, can't do tramadol due to seizures, recommend switching to Hydrocodone-Ibuprofen, also recommend we try down to 5/day (2 in morning, 1 at lunch, 2 at bedtime)   - Continue flexeril at night   - Continue gabapentin     Diagnosis: Low back pain, s/p fusion 2/24/17   How long have you been treating this patient: 7/20/16  Pain type: Multi-factorial   Barriers to management: History  of drug seeking behavior, not currently   Alternative pain approaches: Cymbalta, Gabapentin, Lidoderm, Physical therapy, surgery   Comorbidities: Mood     DIRE score (choices 1-3 with 3 being better candidate)    4/25/2017 5/25/2017   Total Score 17 17     Last urine drug screen result: 4/25/17     Narcotic use --    Analgesia: Good    Adverse Effect: Constipation    Activity Level: Easy - working on getting to moderate    Adherence: Good     CSA - 4/25/17    reviewed and no concerns    6 & 7. Mood   - Discussed giving increase in Abilify more time   - Continue Cymbalta   - Recheck at each follow up     The patient indicates understanding of these issues and agrees with the plan.    Follow up: 2 weeks due to medication change       Zach Rosario PA-C  [facility]         Due to language barrier, an  was present during the history-taking and subsequent discussion (and for part of the physical exam) with this patient.

## 2017-05-22 ENCOUNTER — MYC REFILL (OUTPATIENT)
Dept: FAMILY MEDICINE | Facility: OTHER | Age: 27
End: 2017-05-22

## 2017-05-22 DIAGNOSIS — M51.16 LUMBAR DISC DISEASE WITH RADICULOPATHY: ICD-10-CM

## 2017-05-22 DIAGNOSIS — G89.4 CHRONIC PAIN SYNDROME: ICD-10-CM

## 2017-05-22 DIAGNOSIS — R11.0 NAUSEA: ICD-10-CM

## 2017-05-22 DIAGNOSIS — Z98.1 S/P LUMBAR FUSION: ICD-10-CM

## 2017-05-22 DIAGNOSIS — M54.42 CHRONIC BILATERAL LOW BACK PAIN WITH LEFT-SIDED SCIATICA: ICD-10-CM

## 2017-05-22 DIAGNOSIS — G89.29 CHRONIC BILATERAL LOW BACK PAIN WITH LEFT-SIDED SCIATICA: ICD-10-CM

## 2017-05-22 RX ORDER — HYDROCODONE BITARTRATE AND ACETAMINOPHEN 5; 325 MG/1; MG/1
1-2 TABLET ORAL 3 TIMES DAILY PRN
Qty: 84 TABLET | Refills: 0 | Status: SHIPPED | OUTPATIENT
Start: 2017-05-22 | End: 2017-05-30

## 2017-05-22 RX ORDER — ONDANSETRON 4 MG/1
4-8 TABLET, ORALLY DISINTEGRATING ORAL EVERY 6 HOURS PRN
Qty: 30 TABLET | Refills: 3 | Status: SHIPPED | OUTPATIENT
Start: 2017-05-22 | End: 2017-08-03

## 2017-05-22 RX ORDER — HYDROCODONE BITARTRATE AND ACETAMINOPHEN 5; 325 MG/1; MG/1
1-2 TABLET ORAL 2 TIMES DAILY PRN
Qty: 84 TABLET | Refills: 0 | Status: SHIPPED | OUTPATIENT
Start: 2017-05-22 | End: 2017-05-22

## 2017-05-22 NOTE — TELEPHONE ENCOUNTER
Medication(s) reviewed and approved. Rx. Was signed and given to Kat Castro MA, who will bring to patient at the .     Please notify that this has been done.      Please close the encounter when finished with it.     Aura Rosario PA-C

## 2017-05-22 NOTE — TELEPHONE ENCOUNTER
Message from [employer]:  Original authorizing provider: LOVE Bishop would like a refill of the following medications:  HYDROcodone-acetaminophen (NORCO) 5-325 MG per tablet [Aura Rosario PA-C]    Preferred pharmacy: [facility] - [place], [place] - [address] [facility]    Comment:  Need the zofran pill that dissolves in mouth and almost out of norco. I can  at [facility] pharmacy.    Medication renewals requested in this message routed to other providers:  ondansetron (ZOFRAN ODT) 4 MG ODT tab [Kat Trimble, NATA CNP]

## 2017-05-25 ENCOUNTER — OFFICE VISIT (OUTPATIENT)
Dept: FAMILY MEDICINE | Facility: OTHER | Age: 27
End: 2017-05-25
Payer: MEDICARE

## 2017-05-25 ENCOUNTER — MYC MEDICAL ADVICE (OUTPATIENT)
Dept: FAMILY MEDICINE | Facility: OTHER | Age: 27
End: 2017-05-25

## 2017-05-25 ENCOUNTER — TELEPHONE (OUTPATIENT)
Dept: FAMILY MEDICINE | Facility: OTHER | Age: 27
End: 2017-05-25

## 2017-05-25 VITALS
BODY MASS INDEX: 27.49 KG/M2 | HEART RATE: 96 BPM | WEIGHT: 149 LBS | RESPIRATION RATE: 16 BRPM | DIASTOLIC BLOOD PRESSURE: 80 MMHG | OXYGEN SATURATION: 100 % | SYSTOLIC BLOOD PRESSURE: 116 MMHG | TEMPERATURE: 98.5 F

## 2017-05-25 DIAGNOSIS — F41.9 ANXIETY: ICD-10-CM

## 2017-05-25 DIAGNOSIS — G89.29 CHRONIC BILATERAL LOW BACK PAIN WITH LEFT-SIDED SCIATICA: ICD-10-CM

## 2017-05-25 DIAGNOSIS — F33.0 MAJOR DEPRESSIVE DISORDER, RECURRENT EPISODE, MILD (H): ICD-10-CM

## 2017-05-25 DIAGNOSIS — Z98.1 S/P LUMBAR FUSION: ICD-10-CM

## 2017-05-25 DIAGNOSIS — M51.16 LUMBAR DISC DISEASE WITH RADICULOPATHY: ICD-10-CM

## 2017-05-25 DIAGNOSIS — G43.109 MIGRAINE WITH AURA AND WITHOUT STATUS MIGRAINOSUS, NOT INTRACTABLE: Primary | ICD-10-CM

## 2017-05-25 DIAGNOSIS — G89.4 CHRONIC PAIN SYNDROME: ICD-10-CM

## 2017-05-25 DIAGNOSIS — M54.42 CHRONIC BILATERAL LOW BACK PAIN WITH LEFT-SIDED SCIATICA: ICD-10-CM

## 2017-05-25 PROCEDURE — 99214 OFFICE O/P EST MOD 30 MIN: CPT | Performed by: PHYSICIAN ASSISTANT

## 2017-05-25 RX ORDER — RIZATRIPTAN BENZOATE 5 MG/1
5-10 TABLET, ORALLY DISINTEGRATING ORAL
Qty: 18 TABLET | Refills: 3 | Status: ON HOLD | OUTPATIENT
Start: 2017-05-25 | End: 2017-07-02

## 2017-05-25 RX ORDER — HYDROCODONE BITARTRATE AND IBUPROFEN 5; 200 MG/1; MG/1
1-2 TABLET ORAL 3 TIMES DAILY PRN
Qty: 75 TABLET | Refills: 0 | Status: SHIPPED | OUTPATIENT
Start: 2017-05-25 | End: 2017-05-30

## 2017-05-25 ASSESSMENT — ANXIETY QUESTIONNAIRES
GAD7 TOTAL SCORE: 15
1. FEELING NERVOUS, ANXIOUS, OR ON EDGE: NEARLY EVERY DAY
5. BEING SO RESTLESS THAT IT IS HARD TO SIT STILL: SEVERAL DAYS
2. NOT BEING ABLE TO STOP OR CONTROL WORRYING: NEARLY EVERY DAY
6. BECOMING EASILY ANNOYED OR IRRITABLE: NEARLY EVERY DAY
3. WORRYING TOO MUCH ABOUT DIFFERENT THINGS: NEARLY EVERY DAY
7. FEELING AFRAID AS IF SOMETHING AWFUL MIGHT HAPPEN: NOT AT ALL
7. FEELING AFRAID AS IF SOMETHING AWFUL MIGHT HAPPEN: NOT AT ALL
GAD7 TOTAL SCORE: 0
GAD7 TOTAL SCORE: 0
4. TROUBLE RELAXING: MORE THAN HALF THE DAYS

## 2017-05-25 ASSESSMENT — PATIENT HEALTH QUESTIONNAIRE - PHQ9
10. IF YOU CHECKED OFF ANY PROBLEMS, HOW DIFFICULT HAVE THESE PROBLEMS MADE IT FOR YOU TO DO YOUR WORK, TAKE CARE OF THINGS AT HOME, OR GET ALONG WITH OTHER PEOPLE: SOMEWHAT DIFFICULT
SUM OF ALL RESPONSES TO PHQ QUESTIONS 1-9: 4
SUM OF ALL RESPONSES TO PHQ QUESTIONS 1-9: 4

## 2017-05-25 ASSESSMENT — PAIN SCALES - GENERAL: PAINLEVEL: MILD PAIN (3)

## 2017-05-25 NOTE — PATIENT INSTRUCTIONS
Lifting restrictions  - <8 lbs for 2 weeks   - <15 lbs for 2 months   - <25 lbs for 4 months   - <35 lbs for 6 months     -STOP Hydrocodone- Acetaminophen  (Norco)   - Start Hydrocodone-Ibuprofen - 5 per day     - Recheck 2 weeks

## 2017-05-25 NOTE — TELEPHONE ENCOUNTER
Reason for call:  Medication  Reason for Call:  Medication or medication refill:    Do you use a [facility]?  Name of the pharmacy and phone number for the current request:  [facility] - [phone]    Name of the medication requested: n/a    Other request: pt reporting that her insurance co will not pay for the maxalt or hydrocodone/IBU.  Pt is requesting alternate medications.  Please call to advise.  thanks    Can we leave a detailed message on this number? YES    Phone number patient can be reached at: Cell number on file:    Telephone Information:   Mobile [phone]       Best Time: any    Call taken on 5/25/2017 at 2:40 PM by Atiya Venegas

## 2017-05-25 NOTE — NURSING NOTE
"Chief Complaint   Patient presents with     Pain     Health Maintenance     phq9, isis, yearly urine drug screen       Initial /80 (BP Location: Left arm, Patient Position: Chair, Cuff Size: Adult Regular)  Pulse 96  Temp 98.5  F (36.9  C) (Oral)  Resp 16  Wt 149 lb (67.6 kg)  SpO2 100%  BMI 27.49 kg/m2 Estimated body mass index is 27.49 kg/(m^2) as calculated from the following:    Height as of 5/8/17: 5' 1.73\" (1.568 m).    Weight as of this encounter: 149 lb (67.6 kg).  Medication Reconciliation: complete  "

## 2017-05-25 NOTE — MR AVS SNAPSHOT
After Visit Summary   5/25/2017    Katy Islas    MRN: [id number]           Patient Information     Date Of Birth          1990        Visit Information        Provider Department      5/25/2017 1:00 PM Aura Rosario PA-C; ASL IS [facility]        Today's Diagnoses     Migraine with aura and without status migrainosus, not intractable    -  1    Chronic pain syndrome        Chronic bilateral low back pain with left-sided sciatica        Lumbar disc disease with radiculopathy        S/P lumbar fusion          Care Instructions    Lifting restrictions  - <8 lbs for 2 weeks   - <15 lbs for 2 months   - <25 lbs for 4 months   - <35 lbs for 6 months     -STOP Hydrocodone- Acetaminophen  (Norco)   - Start Hydrocodone-Ibuprofen - 5 per day     - Recheck 2 weeks           Follow-ups after your visit        Your next 10 appointments already scheduled     May 30, 2017  8:00 AM CDT   New Visit with Natalie Zimmerman   [facility] ([facility])    [address] [place] [address] [id number] [phone]              Who to contact     If you have questions or need follow up information about today's clinic visit or your schedule please contact [facility] directly at [phone].  Normal or non-critical lab and imaging results will be communicated to you by Sincerelyhart, letter or phone within 4 business days after the clinic has received the results. If you do not hear from us within 7 days, please contact the clinic through Sincerelyhart or phone. If you have a critical or abnormal lab result, we will notify you by phone as soon as possible.  Submit refill requests through Cerecor or call your pharmacy and they will forward the refill request to us. Please allow 3 business days for your refill to be completed.          Additional Information About Your Visit        Cerecor Information     Cerecor gives you secure access to  your electronic health record. If you see a primary care provider, you can also send messages to your care team and make appointments. If you have questions, please call your primary care clinic.  If you do not have a primary care provider, please call [phone] and they will assist you.        Care EveryWhere ID     This is your Care EveryWhere ID. This could be used by other organizations to access your [facility] medical records  [id number]        Your Vitals Were     Pulse Temperature Respirations Pulse Oximetry BMI (Body Mass Index)       96 98.5  F (36.9  C) (Oral) 16 100% 27.49 kg/m2        Blood Pressure from Last 3 Encounters:   05/25/17 116/80   05/16/17 122/78   05/08/17 118/72    Weight from Last 3 Encounters:   05/25/17 149 lb (67.6 kg)   05/16/17 151 lb 12.8 oz (68.9 kg)   05/08/17 148 lb (67.1 kg)              Today, you had the following     No orders found for display         Today's Medication Changes          These changes are accurate as of: 5/25/17  1:54 PM.  If you have any questions, ask your nurse or doctor.               Start taking these medicines.        Dose/Directions    Hydrocodone-Ibuprofen 5-200 MG Tabs   Used for:  Chronic pain syndrome, Chronic bilateral low back pain with left-sided sciatica, Lumbar disc disease with radiculopathy, S/P lumbar fusion   Started by:  Aura Rosario PA-C        Dose:  1-2 tablet   Take 1-2 tablets by mouth 3 times daily as needed (Max 5 per day)   Quantity:  75 tablet   Refills:  0       rizatriptan 5 MG ODT tab   Commonly known as:  MAXALT-MLT   Used for:  Migraine with aura and without status migrainosus, not intractable   Started by:  Aura Rosario PA-C        Dose:  5-10 mg   Take 1-2 tablets (5-10 mg) by mouth at onset of headache for migraine May repeat in 2 hours. Max 6 tablets/24 hours.   Quantity:  18 tablet   Refills:  3            Where to get your medicines      These medications were sent to [facility], [place] - [address]  [address] [address]     Phone:  [phone]     rizatriptan 5 MG ODT tab         Some of these will need a paper prescription and others can be bought over the counter.  Ask your nurse if you have questions.     Bring a paper prescription for each of these medications     Hydrocodone-Ibuprofen 5-200 MG Tabs                Primary Care Provider Office Phone # Fax #    Aura EMILY Rosario PA-C [phone]       [facility] [address]  [facility] [address]        Thank you!     Thank you for choosing [facility]  for your care. Our goal is always to provide you with excellent care. Hearing back from our patients is one way we can continue to improve our services. Please take a few minutes to complete the written survey that you may receive in the mail after your visit with us. Thank you!             Your Updated Medication List - Protect others around you: Learn how to safely use, store and throw away your medicines at www.disposemymeds.org.          This list is accurate as of: 5/25/17  1:54 PM.  Always use your most recent med list.                   Brand Name Dispense Instructions for use    ALLEGRA 180 MG tablet   Generic drug:  fexofenadine      Take 180 mg by mouth daily Reported on 5/16/2017       ARIPiprazole 2 MG tablet    ABILIFY    90 tablet    Take 1 tablet (2 mg) by mouth At Bedtime       cyclobenzaprine 10 MG tablet    FLEXERIL    60 tablet    Take 1 tablet (10 mg) by mouth 2 times daily as needed for muscle spasms or other (back pain)       dexlansoprazole 60 MG Cpdr CR capsule    DEXILANT    90 capsule    Take 1 capsule (60 mg) by mouth daily       dicyclomine 20 MG tablet    BENTYL    30 tablet    Take 1 tablet (20 mg) by mouth 4 times daily as needed (ABDOMINAL CRAMPING) AS NEEDED FOR CRAMPING       DULoxetine 60 MG EC capsule    CYMBALTA    90 capsule    Take 1  capsule (60 mg) by mouth daily       EPINEPHrine 0.3 MG/0.3ML injection     0.6 mL    Inject 0.3 mLs (0.3 mg) into the muscle once as needed for anaphylaxis       FLEX-A-MIN JOINT FLEX PO      Take by mouth daily Reported on 3/28/2017       fluticasone 50 MCG/ACT spray    FLONASE    1 Bottle    Spray 2 sprays into both nostrils daily       fluticasone-salmeterol 100-50 MCG/DOSE diskus inhaler    ADVAIR    1 Inhaler    Inhale 1 puff into the lungs 2 times daily       gabapentin 300 MG capsule    NEURONTIN    270 capsule    Take 1 capsule (300 mg) by mouth 3 times daily       HYDROcodone-acetaminophen 5-325 MG per tablet    NORCO    84 tablet    Take 1-2 tablets by mouth 3 times daily as needed for moderate to severe pain maximum 6 tablet(s) per day       Hydrocodone-Ibuprofen 5-200 MG Tabs     75 tablet    Take 1-2 tablets by mouth 3 times daily as needed (Max 5 per day)       lidocaine 5 % Patch    LIDODERM    30 patch    Apply up to 3 patches to painful area at once for up to 12 h within a 24 h period.  Remove after 12 hours.       meclizine 12.5 MG tablet    ANTIVERT    30 tablet    Take 1 tablet (12.5 mg) by mouth 4 times daily as needed for dizziness       medical cannabis inhalation (Patient's own supply.  Not a prescription)      Inhale into the lungs 3 times daily as needed Reported on 3/28/2017       Multi-vitamin Tabs tablet   Generic drug:  multivitamin, therapeutic with minerals      Take 1 tablet by mouth daily Reported on 3/28/2017       ondansetron 4 MG ODT tab    ZOFRAN ODT    30 tablet    Take 1-2 tablets (4-8 mg) by mouth every 6 hours as needed for nausea Take 1-2 tablets by mouth as needed for nausea       promethazine 25 MG tablet    PHENERGAN    60 tablet    Take 1 tablet (25 mg) by mouth every 6 hours as needed for nausea       rizatriptan 5 MG ODT tab    MAXALT-MLT    18 tablet    Take 1-2 tablets (5-10 mg) by mouth at onset of headache for migraine May repeat in 2 hours. Max 6 tablets/24  hours.       senna-docusate 8.6-50 MG per tablet    SENOKOT-S;PERICOLACE    60 tablet    Take 1 tablet by mouth 2 times daily as needed for constipation       sucralfate 1 GM tablet    CARAFATE     Take by mouth 4 times daily Reported on 5/16/2017       SUMAtriptan 50 MG tablet    IMITREX    18 tablet    Take 1 tablet (50 mg) by mouth at onset of headache for migraine May repeat in 2 hours. Max 8 tablets/24 hours.       Vitamin D-3 5000 UNITS Tabs      Take by mouth daily Reported on 5/16/2017

## 2017-05-26 RX ORDER — OXYCODONE HYDROCHLORIDE 5 MG/1
5 TABLET ORAL EVERY 4 HOURS PRN
Qty: 35 TABLET | Refills: 0 | Status: SHIPPED | OUTPATIENT
Start: 2017-05-26 | End: 2017-05-30

## 2017-05-26 RX ORDER — RIZATRIPTAN BENZOATE 5 MG/1
5-10 TABLET ORAL
Qty: 18 TABLET | Refills: 1 | Status: SHIPPED | OUTPATIENT
Start: 2017-05-26 | End: 2017-08-03

## 2017-05-26 ASSESSMENT — PATIENT HEALTH QUESTIONNAIRE - PHQ9: SUM OF ALL RESPONSES TO PHQ QUESTIONS 1-9: 4

## 2017-05-26 NOTE — TELEPHONE ENCOUNTER
OK for plain Maxalt. Given Oxycodone 5 per day to get patient through one week until PA for Vicoprofen decision.    Zach Rosario PA-C  [facility]

## 2017-05-29 ENCOUNTER — TELEPHONE (OUTPATIENT)
Dept: FAMILY MEDICINE | Facility: OTHER | Age: 27
End: 2017-05-29

## 2017-05-29 ENCOUNTER — HOSPITAL ENCOUNTER (EMERGENCY)
Facility: CLINIC | Age: 27
Discharge: HOME | End: 2017-05-29
Attending: FAMILY MEDICINE | Admitting: FAMILY MEDICINE
Payer: MEDICARE

## 2017-05-29 VITALS
WEIGHT: 145 LBS | SYSTOLIC BLOOD PRESSURE: 119 MMHG | TEMPERATURE: 97.8 F | RESPIRATION RATE: 16 BRPM | BODY MASS INDEX: 26.75 KG/M2 | HEART RATE: 78 BPM | DIASTOLIC BLOOD PRESSURE: 80 MMHG | OXYGEN SATURATION: 100 %
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
## 2017-05-29 DIAGNOSIS — S39.012A STRAIN OF LUMBAR REGION, INITIAL ENCOUNTER: ICD-10-CM

## 2017-05-29 PROCEDURE — 99283 EMERGENCY DEPT VISIT LOW MDM: CPT | Mod: Z6 | Performed by: FAMILY MEDICINE

## 2017-05-29 PROCEDURE — A9270 NON-COVERED ITEM OR SERVICE: HCPCS | Mod: GY | Performed by: FAMILY MEDICINE

## 2017-05-29 PROCEDURE — 99283 EMERGENCY DEPT VISIT LOW MDM: CPT | Performed by: FAMILY MEDICINE

## 2017-05-29 PROCEDURE — 25000132 ZZH RX MED GY IP 250 OP 250 PS 637: Mod: GY | Performed by: FAMILY MEDICINE

## 2017-05-29 RX ORDER — DIAZEPAM 5 MG
5 TABLET ORAL ONCE
Status: COMPLETED | OUTPATIENT
Start: 2017-05-29 | End: 2017-05-29

## 2017-05-29 RX ORDER — DIAZEPAM 5 MG
2.5-5 TABLET ORAL EVERY 6 HOURS PRN
Qty: 8 TABLET | Refills: 0 | Status: SHIPPED | OUTPATIENT
Start: 2017-05-29 | End: 2017-05-30

## 2017-05-29 RX ADMIN — DIAZEPAM 5 MG: 5 TABLET ORAL at 20:18

## 2017-05-29 NOTE — ED AVS SNAPSHOT
[facility] Emergency Department    [address] [place] [address]    Phone:  [phone]    Fax:  [phone]                                       Katy Islas   MRN: [id number]    Department:  [facility] Emergency Department   Date of Visit:  5/29/2017           After Visit Summary Signature Page     I have received my discharge instructions, and my questions have been answered. I have discussed any challenges I see with this plan with the nurse or doctor.    ..........................................................................................................................................  Patient/Patient Representative Signature      ..........................................................................................................................................  Patient Representative Print Name and Relationship to Patient    ..................................................               ................................................  Date                                            Time    ..........................................................................................................................................  Reviewed by Signature/Title    ...................................................              ..............................................  Date                                                            Time

## 2017-05-29 NOTE — ED AVS SNAPSHOT
[facility] Department    [address]     [place] [address]    Phone:  [phone]    Fax:  [phone]                                       Katy Islas   MRN: [id number]    Department:  [facility] Emergency Department   Date of Visit:  5/29/2017           Patient Information     Date Of Birth          1990        Your diagnoses for this visit were:     Strain of lumbar region, initial encounter        You were seen by Emily Scherer MD.      Follow-up Information     Follow up with Auar Rosario PA-C In 3 days.    Specialty:  Physician Assistant - Medical    Why:  if not improving    Contact information:    [facility]  [address]  [facility] [address]  [phone]          Discharge Instructions       Thank you for giving us the opportunity to see you. The impression is that you have a lumbar muscular strain.    Continue with her current pain regimen.  Add Valium 2. 5-5 milligrams 4 times day for spasms.    Continue with normal activity but do not lift or strain your back.    If you are not seeing an improvement within 3 days, please follow up with your primary care provider or clinic.     After discharge, please closely monitor for any new or worsening symptoms. Return to the Emergency Department at any time if your symptoms worsen.        Back Sprain or Strain    Injury to the muscles (strain) or ligaments (sprain) around the spine can be troubling. Injury may occur after a sudden forceful twisting or bending force such as in a car accident, after a simple awkward movement, or after lifting something heavy with poor body positioning. In any case, muscle spasm is often present and adds to the pain.  Thankfully, most people feel better in 1 to 2 weeks, and most of the rest in 1 to 2 months. Most people can remain active. Unless you had a forceful physical injury such as a car accident or fall, X-rays are usually not  ordered for the first evaluation of a back sprain or strain. If pain continues and does not respond to medical treatment, your healthcare provider may order X-rays and other tests.  Home care  The following guidelines will help you care for your injury at home:    When in bed, try to find a comfortable position. A firm mattress is best. Try lying flat on your back with pillows under your knees. You can also try lying on your side with your knees bent up toward your chest and a pillow between your knees.    Don't sit for long periods. Try not to take long car rides or take other trips that have you sitting for a long time. This puts more stress on the lower back than standing or walking.    During the first 24 to 72 hours after an injury or flare-up, apply an ice pack to the painful area for 20 minutes. Then remove it for 20 minutes. Do this for 60 to 90 minutes, or several times a day, This will reduce swelling and pain. Be sure to wrap the ice pack in a thin towel or plastic to protect your skin.    You can start with ice, then switch to heat. Heat from a hot shower, hot bath, or heating pad reduces swelling and pain and works well for muscle spasms. Put heat on the painful area for 20 minutes, then remove for 20 minutes. Do this for 60 to 90 minutes, or several times a day. Do not use a heating pad while sleeping. It can burn the skin.    You can alternate the ice and heat. Talk with your healthcare provider to find out the best treatment or therapy for your back pain.    Therapeutic massage will help relax the back muscles without stretching them.    Be aware of safe lifting methods. Do not lift anything over 15 pounds until all of the pain is gone.  Medicines  Talk to your healthcare provider before using medicines, especially if you have other health problems or are taking other medicines.    You may use acetaminophen or ibuprofen to control pain, unless another pain medicine was prescribed. If you have chronic  conditions like diabetes, liver or kidney disease, stomach ulcers, or gastrointestinal bleeding, or are taking blood-thinner medicines, talk with your doctor before taking any medicines.    Be careful if you are given prescription medicines, narcotics, or medicine for muscle spasm. They can cause drowsiness, and affect your coordination, reflexes, and judgment. Do not drive or operate heavy machinery.  Follow-up care  Follow up with your healthcare provider or this facility as advised. You may need physical therapy or more tests if your symptoms get worse.  If you had X-rays today, they didn t show any broken bones, breaks, or fractures. Sometimes fractures don t show up on the first X-ray. Bruises and sprains can sometimes hurt as much as a fracture. These injuries can take time to heal completely. If your symptoms don t improve or they get worse, talk with your healthcare provider. You may need a repeat X-ray.  Call 911  Call for emergency care if any of the following occur:    Trouble breathing    Confused    Very drowsy or trouble awakening    Fainting or loss of consciousness    Rapid or very slow heart rate    Loss of bowel or bladder control  When to seek medical advice  Call your healthcare provider right away if any of the following occur:    Pain gets worse or spreads to your arms or legs    Weakness or numbness in one or both arms or legs    Numbness in the groin or genital area    7806-6653 [employer]. [address] [place] [address]. All rights reserved. This information is not intended as a substitute for professional medical care. Always follow your healthcare professional's instructions.          Future Appointments        Provider Department Dept Phone Center    5/30/2017 8:00 AM Natalie Zimmerman; ASL IS [facility] [phone] [facility]      24 Hour Appointment Hotline       To make an appointment at any [facility], call [phone]  ([phone]). If you don't have a family doctor or clinic, we will help you find one. [facility] clinics are conveniently located to serve the needs of you and your family.             Review of your medicines      START taking        Dose / Directions Last dose taken    diazepam 5 MG tablet   Commonly known as:  VALIUM   Dose:  2.5-5 mg   Quantity:  8 tablet        Take 0.5-1 tablets (2.5-5 mg) by mouth every 6 hours as needed for anxiety or sleep (MUSCLE SPASM)   Refills:  0          Our records show that you are taking the medicines listed below. If these are incorrect, please call your family doctor or clinic.        Dose / Directions Last dose taken    ALLEGRA 180 MG tablet   Dose:  180 mg   Generic drug:  fexofenadine        Take 180 mg by mouth daily Reported on 5/16/2017   Refills:  0        ARIPiprazole 2 MG tablet   Commonly known as:  ABILIFY   Dose:  2 mg   Quantity:  90 tablet        Take 1 tablet (2 mg) by mouth At Bedtime   Refills:  3        cyclobenzaprine 10 MG tablet   Commonly known as:  FLEXERIL   Dose:  10 mg   Quantity:  60 tablet        Take 1 tablet (10 mg) by mouth 2 times daily as needed for muscle spasms or other (back pain)   Refills:  3        dexlansoprazole 60 MG Cpdr CR capsule   Commonly known as:  DEXILANT   Dose:  60 mg   Quantity:  90 capsule        Take 1 capsule (60 mg) by mouth daily   Refills:  3        dicyclomine 20 MG tablet   Commonly known as:  BENTYL   Dose:  20 mg   Quantity:  30 tablet        Take 1 tablet (20 mg) by mouth 4 times daily as needed (ABDOMINAL CRAMPING) AS NEEDED FOR CRAMPING   Refills:  3        DULoxetine 60 MG EC capsule   Commonly known as:  CYMBALTA   Dose:  60 mg   Quantity:  90 capsule        Take 1 capsule (60 mg) by mouth daily   Refills:  3        EPINEPHrine 0.3 MG/0.3ML injection   Dose:  0.3 mg   Quantity:  0.6 mL        Inject 0.3 mLs (0.3 mg) into the muscle once as needed for anaphylaxis   Refills:  1        FLEX-A-MIN JOINT FLEX PO         Take by mouth daily Reported on 3/28/2017   Refills:  0        fluticasone 50 MCG/ACT spray   Commonly known as:  FLONASE   Dose:  2 spray   Quantity:  1 Bottle        Spray 2 sprays into both nostrils daily   Refills:  3        fluticasone-salmeterol 100-50 MCG/DOSE diskus inhaler   Commonly known as:  ADVAIR   Dose:  1 puff   Quantity:  1 Inhaler        Inhale 1 puff into the lungs 2 times daily   Refills:  5        gabapentin 300 MG capsule   Commonly known as:  NEURONTIN   Dose:  300 mg   Quantity:  270 capsule        Take 1 capsule (300 mg) by mouth 3 times daily   Refills:  1        HYDROcodone-acetaminophen 5-325 MG per tablet   Commonly known as:  NORCO   Dose:  1-2 tablet   Quantity:  84 tablet        Take 1-2 tablets by mouth 3 times daily as needed for moderate to severe pain maximum 6 tablet(s) per day   Refills:  0        Hydrocodone-Ibuprofen 5-200 MG Tabs   Dose:  1-2 tablet   Quantity:  75 tablet        Take 1-2 tablets by mouth 3 times daily as needed (Max 5 per day)   Refills:  0        lidocaine 5 % Patch   Commonly known as:  LIDODERM   Quantity:  30 patch        Apply up to 3 patches to painful area at once for up to 12 h within a 24 h period.  Remove after 12 hours.   Refills:  3        meclizine 12.5 MG tablet   Commonly known as:  ANTIVERT   Dose:  12.5 mg   Quantity:  30 tablet        Take 1 tablet (12.5 mg) by mouth 4 times daily as needed for dizziness   Refills:  0        medical cannabis inhalation (Patient's own supply.  Not a prescription)        Inhale into the lungs 3 times daily as needed Reported on 3/28/2017   Refills:  0        Multi-vitamin Tabs tablet   Dose:  1 tablet   Generic drug:  multivitamin, therapeutic with minerals        Take 1 tablet by mouth daily Reported on 3/28/2017   Refills:  0        ondansetron 4 MG ODT tab   Commonly known as:  ZOFRAN ODT   Dose:  4-8 mg   Quantity:  30 tablet        Take 1-2 tablets (4-8 mg) by mouth every 6 hours as needed for nausea  Take 1-2 tablets by mouth as needed for nausea   Refills:  3        oxyCODONE 5 MG IR tablet   Commonly known as:  ROXICODONE   Dose:  5 mg   Quantity:  35 tablet        Take 1 tablet (5 mg) by mouth every 4 hours as needed for pain maximum 5 tablet(s) per day   Refills:  0        promethazine 25 MG tablet   Commonly known as:  PHENERGAN   Dose:  25 mg   Quantity:  60 tablet        Take 1 tablet (25 mg) by mouth every 6 hours as needed for nausea   Refills:  3        * rizatriptan 5 MG ODT tab   Commonly known as:  MAXALT-MLT   Dose:  5-10 mg   Quantity:  18 tablet        Take 1-2 tablets (5-10 mg) by mouth at onset of headache for migraine May repeat in 2 hours. Max 6 tablets/24 hours.   Refills:  3        * rizatriptan 5 MG tablet   Commonly known as:  MAXALT   Dose:  5-10 mg   Quantity:  18 tablet        Take 1-2 tablets (5-10 mg) by mouth at onset of headache for migraine May repeat in 2 hours. Max 6 tablets/24 hours.   Refills:  1        senna-docusate 8.6-50 MG per tablet   Commonly known as:  SENOKOT-S;PERICOLACE   Dose:  1 tablet   Quantity:  60 tablet        Take 1 tablet by mouth 2 times daily as needed for constipation   Refills:  3        sucralfate 1 GM tablet   Commonly known as:  CARAFATE        Take by mouth 4 times daily Reported on 5/16/2017   Refills:  0        SUMAtriptan 50 MG tablet   Commonly known as:  IMITREX   Dose:  50 mg   Quantity:  18 tablet        Take 1 tablet (50 mg) by mouth at onset of headache for migraine May repeat in 2 hours. Max 8 tablets/24 hours.   Refills:  3        Vitamin D-3 5000 UNITS Tabs        Take by mouth daily Reported on 5/16/2017   Refills:  0        * Notice:  This list has 2 medication(s) that are the same as other medications prescribed for you. Read the directions carefully, and ask your doctor or other care provider to review them with you.            Prescriptions were sent or printed at these locations (1 Prescription)                   [facility]    [address] [address]    Telephone:  [phone]   Fax:  [phone]   Hours:                  Printed at Department/Unit printer (1 of 1)         diazepam (VALIUM) 5 MG tablet                Orders Needing Specimen Collection     None      Pending Results     No orders found from 5/27/2017 to 5/30/2017.            Pending Culture Results     No orders found from 5/27/2017 to 5/30/2017.            Pending Results Instructions     If you had any lab results that were not finalized at the time of your Discharge, you can call the ED Lab Result RN at [phone]. You will be contacted by this team for any positive Lab results or changes in treatment. The nurses are available 7 days a week from 10A to 6:30P.  You can leave a message 24 hours per day and they will return your call.        Thank you for choosing [facility]       Thank you for choosing [facility] for your care. Our goal is always to provide you with excellent care. Hearing back from our patients is one way we can continue to improve our services. Please take a few minutes to complete the written survey that you may receive in the mail after you visit with us. Thank you!        [employer] Information     Nano[employer] gives you secure access to your electronic health record. If you see a primary care provider, you can also send messages to your care team and make appointments. If you have questions, please call your primary care clinic.  If you do not have a primary care provider, please call [phone] and they will assist you.        Care EveryWhere ID     This is your Care EveryWhere ID. This could be used by other organizations to access your [facility] medical records  [id number]        After Visit Summary       This is your record. Keep this with you and show to your community pharmacist(s) and doctor(s) at your next visit.

## 2017-05-30 ENCOUNTER — OFFICE VISIT (OUTPATIENT)
Dept: FAMILY MEDICINE | Facility: OTHER | Age: 27
End: 2017-05-30
Payer: MEDICARE

## 2017-05-30 ENCOUNTER — OFFICE VISIT (OUTPATIENT)
Dept: AUDIOLOGY | Facility: OTHER | Age: 27
End: 2017-05-30
Payer: MEDICARE

## 2017-05-30 VITALS
HEART RATE: 66 BPM | SYSTOLIC BLOOD PRESSURE: 120 MMHG | TEMPERATURE: 98.1 F | BODY MASS INDEX: 27.82 KG/M2 | DIASTOLIC BLOOD PRESSURE: 62 MMHG | OXYGEN SATURATION: 99 % | RESPIRATION RATE: 10 BRPM | WEIGHT: 150.8 LBS

## 2017-05-30 DIAGNOSIS — H90.3 SENSORINEURAL HEARING LOSS, BILATERAL: Primary | ICD-10-CM

## 2017-05-30 DIAGNOSIS — G89.29 CHRONIC BILATERAL LOW BACK PAIN WITH LEFT-SIDED SCIATICA: ICD-10-CM

## 2017-05-30 DIAGNOSIS — Z98.1 S/P LUMBAR FUSION: ICD-10-CM

## 2017-05-30 DIAGNOSIS — G89.4 CHRONIC PAIN SYNDROME: ICD-10-CM

## 2017-05-30 DIAGNOSIS — M51.16 LUMBAR DISC DISEASE WITH RADICULOPATHY: ICD-10-CM

## 2017-05-30 DIAGNOSIS — M54.42 CHRONIC BILATERAL LOW BACK PAIN WITH LEFT-SIDED SCIATICA: ICD-10-CM

## 2017-05-30 PROCEDURE — 99214 OFFICE O/P EST MOD 30 MIN: CPT | Performed by: PHYSICIAN ASSISTANT

## 2017-05-30 PROCEDURE — V5267 HEARING AID SUP/ACCESS/DEV: HCPCS | Performed by: AUDIOLOGIST

## 2017-05-30 PROCEDURE — 99207 ZZC NO CHARGE LOS: CPT | Performed by: AUDIOLOGIST

## 2017-05-30 RX ORDER — OXYCODONE HYDROCHLORIDE 5 MG/1
5 TABLET ORAL EVERY 4 HOURS PRN
Qty: 150 TABLET | Refills: 0 | Status: SHIPPED | OUTPATIENT
Start: 2017-05-30 | End: 2017-06-13

## 2017-05-30 RX ORDER — NAPROXEN 500 MG/1
500 TABLET ORAL 2 TIMES DAILY WITH MEALS
Qty: 180 TABLET | Refills: 3 | Status: ON HOLD | OUTPATIENT
Start: 2017-05-30 | End: 2017-07-02

## 2017-05-30 RX ORDER — OXYCODONE HYDROCHLORIDE 5 MG/1
5 TABLET ORAL EVERY 4 HOURS PRN
Qty: 35 TABLET | Refills: 0 | Status: CANCELLED | OUTPATIENT
Start: 2017-05-30

## 2017-05-30 RX ORDER — DIAZEPAM 5 MG
2.5-5 TABLET ORAL EVERY 6 HOURS PRN
Qty: 20 TABLET | Refills: 0 | Status: SHIPPED | OUTPATIENT
Start: 2017-05-30 | End: 2017-07-07

## 2017-05-30 ASSESSMENT — PAIN SCALES - GENERAL: PAINLEVEL: MODERATE PAIN (4)

## 2017-05-30 NOTE — TELEPHONE ENCOUNTER
Please send MyChart to patient or call her deaf relay service and see if she can be seen at 2 pm otherwise schedule for Thursday in a 45 min.     Zach Rosario PA-C  [facility]

## 2017-05-30 NOTE — NURSING NOTE
"Chief Complaint   Patient presents with     Back Pain     Panel Management       Initial /62 (BP Location: Right arm, Patient Position: Chair, Cuff Size: Adult Regular)  Pulse 66  Temp 98.1  F (36.7  C) (Oral)  Resp 10  Wt 150 lb 12.8 oz (68.4 kg)  SpO2 99%  BMI 27.82 kg/m2 Estimated body mass index is 27.82 kg/(m^2) as calculated from the following:    Height as of 5/8/17: 5' 1.73\" (1.568 m).    Weight as of this encounter: 150 lb 12.8 oz (68.4 kg).  Medication Reconciliation: complete    "

## 2017-05-30 NOTE — TELEPHONE ENCOUNTER
Sent patient TipTaphart message to see if she can come in at 2pm today. 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

## 2017-05-30 NOTE — TELEPHONE ENCOUNTER
Oxycodone 5 mg      Last Written Prescription Date: 05-  Last Fill Quantity: 35,  # refills: 0   Last Office Visit with [facility], UMP or  Health prescribing provider: 05-  Send signed rx to  [facility]  Natacha Woodall, Pharmacy Tech, [facility] [phone]                                             Next 5 appointments (look out 90 days)     May 30, 2017  2:00 PM CDT   Office Visit with Aura Rosario PA-C, ASL IS   [facility]    [address] [place] [address]   [phone]            Jun 12, 2017 11:45 AM CDT   Office Visit with Aura Rosario PA-C, NL PROC ROOM 1ST FLOOR [facility] ([facility])    [address]  [facility] [address]   [phone]

## 2017-05-30 NOTE — MR AVS SNAPSHOT
After Visit Summary   5/30/2017    Katy sIlas    MRN: [id number]           Patient Information     Date Of Birth          1990        Visit Information        Provider Department      5/30/2017 8:00 AM Therese Piper AuD; ASL IS [facility]        Today's Diagnoses     Sensorineural hearing loss, bilateral    -  1       Follow-ups after your visit        Your next 10 appointments already scheduled     Jun 12, 2017 11:45 AM CDT   Office Visit with Aura Rosario PA-C, NL PROC ROOM 1ST FLOOR [facility] ([facility])    [address]  [facility] [address]   [phone]           Bring a current list of meds and any records pertaining to this visit.  For Physicals, please bring immunization records and any forms needing to be filled out.  Please arrive 10 minutes early to complete paperwork.              Who to contact     If you have questions or need follow up information about today's clinic visit or your schedule please contact [facility] directly at [phone].  Normal or non-critical lab and imaging results will be communicated to you by Digitalsmithshart, letter or phone within 4 business days after the clinic has received the results. If you do not hear from us within 7 days, please contact the clinic through Stoket or phone. If you have a critical or abnormal lab result, we will notify you by phone as soon as possible.  Submit refill requests through WebTuner or call your pharmacy and they will forward the refill request to us. Please allow 3 business days for your refill to be completed.          Additional Information About Your Visit        DigitalsmithsharExecutive [employer]mediary Information     WebTuner gives you secure access to your electronic health record. If you see a primary care provider, you can also send messages to your care team and make appointments. If you have questions, please call your primary care clinic.   If you do not have a primary care provider, please call [phone] and they will assist you.        Care EveryWhere ID     This is your Care EveryWhere ID. This could be used by other organizations to access your [place] medical records  [id number]         Blood Pressure from Last 3 Encounters:   05/29/17 119/80   05/25/17 116/80   05/16/17 122/78    Weight from Last 3 Encounters:   05/29/17 145 lb (65.8 kg)   05/25/17 149 lb (67.6 kg)   05/16/17 151 lb 12.8 oz (68.9 kg)              We Performed the Following     HEARING AID SUPPLY        Primary Care Provider Office Phone # Fax #    Aura EMILY Rosario PA-C [phone]       [facility] [address]  [facility] [address]        Thank you!     Thank you for choosing [facility]  for your care. Our goal is always to provide you with excellent care. Hearing back from our patients is one way we can continue to improve our services. Please take a few minutes to complete the written survey that you may receive in the mail after your visit with us. Thank you!             Your Updated Medication List - Protect others around you: Learn how to safely use, store and throw away your medicines at www.disposemymeds.org.          This list is accurate as of: 5/30/17  9:15 AM.  Always use your most recent med list.                   Brand Name Dispense Instructions for use    ALLEGRA 180 MG tablet   Generic drug:  fexofenadine      Take 180 mg by mouth daily Reported on 5/16/2017       ARIPiprazole 2 MG tablet    ABILIFY    90 tablet    Take 1 tablet (2 mg) by mouth At Bedtime       cyclobenzaprine 10 MG tablet    FLEXERIL    60 tablet    Take 1 tablet (10 mg) by mouth 2 times daily as needed for muscle spasms or other (back pain)       dexlansoprazole 60 MG Cpdr CR capsule    DEXILANT    90 capsule    Take 1 capsule (60 mg) by mouth daily       diazepam 5 MG tablet    VALIUM    8 tablet    Take 0.5-1 tablets  (2.5-5 mg) by mouth every 6 hours as needed for anxiety or sleep (MUSCLE SPASM)       dicyclomine 20 MG tablet    BENTYL    30 tablet    Take 1 tablet (20 mg) by mouth 4 times daily as needed (ABDOMINAL CRAMPING) AS NEEDED FOR CRAMPING       DULoxetine 60 MG EC capsule    CYMBALTA    90 capsule    Take 1 capsule (60 mg) by mouth daily       EPINEPHrine 0.3 MG/0.3ML injection     0.6 mL    Inject 0.3 mLs (0.3 mg) into the muscle once as needed for anaphylaxis       FLEX-A-MIN JOINT FLEX PO      Take by mouth daily Reported on 3/28/2017       fluticasone 50 MCG/ACT spray    FLONASE    1 Bottle    Spray 2 sprays into both nostrils daily       fluticasone-salmeterol 100-50 MCG/DOSE diskus inhaler    ADVAIR    1 Inhaler    Inhale 1 puff into the lungs 2 times daily       gabapentin 300 MG capsule    NEURONTIN    270 capsule    Take 1 capsule (300 mg) by mouth 3 times daily       HYDROcodone-acetaminophen 5-325 MG per tablet    NORCO    84 tablet    Take 1-2 tablets by mouth 3 times daily as needed for moderate to severe pain maximum 6 tablet(s) per day       Hydrocodone-Ibuprofen 5-200 MG Tabs     75 tablet    Take 1-2 tablets by mouth 3 times daily as needed (Max 5 per day)       lidocaine 5 % Patch    LIDODERM    30 patch    Apply up to 3 patches to painful area at once for up to 12 h within a 24 h period.  Remove after 12 hours.       meclizine 12.5 MG tablet    ANTIVERT    30 tablet    Take 1 tablet (12.5 mg) by mouth 4 times daily as needed for dizziness       medical cannabis inhalation (Patient's own supply.  Not a prescription)      Inhale into the lungs 3 times daily as needed Reported on 3/28/2017       Multi-vitamin Tabs tablet   Generic drug:  multivitamin, therapeutic with minerals      Take 1 tablet by mouth daily Reported on 3/28/2017       ondansetron 4 MG ODT tab    ZOFRAN ODT    30 tablet    Take 1-2 tablets (4-8 mg) by mouth every 6 hours as needed for nausea Take 1-2 tablets by mouth as needed for  nausea       oxyCODONE 5 MG IR tablet    ROXICODONE    35 tablet    Take 1 tablet (5 mg) by mouth every 4 hours as needed for pain maximum 5 tablet(s) per day       promethazine 25 MG tablet    PHENERGAN    60 tablet    Take 1 tablet (25 mg) by mouth every 6 hours as needed for nausea       * rizatriptan 5 MG ODT tab    MAXALT-MLT    18 tablet    Take 1-2 tablets (5-10 mg) by mouth at onset of headache for migraine May repeat in 2 hours. Max 6 tablets/24 hours.       * rizatriptan 5 MG tablet    MAXALT    18 tablet    Take 1-2 tablets (5-10 mg) by mouth at onset of headache for migraine May repeat in 2 hours. Max 6 tablets/24 hours.       senna-docusate 8.6-50 MG per tablet    SENOKOT-S;PERICOLACE    60 tablet    Take 1 tablet by mouth 2 times daily as needed for constipation       sucralfate 1 GM tablet    CARAFATE     Take by mouth 4 times daily Reported on 5/16/2017       SUMAtriptan 50 MG tablet    IMITREX    18 tablet    Take 1 tablet (50 mg) by mouth at onset of headache for migraine May repeat in 2 hours. Max 8 tablets/24 hours.       Vitamin D-3 5000 UNITS Tabs      Take by mouth daily Reported on 5/16/2017       * Notice:  This list has 2 medication(s) that are the same as other medications prescribed for you. Read the directions carefully, and ask your doctor or other care provider to review them with you.
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
## 2017-05-30 NOTE — MR AVS SNAPSHOT
After Visit Summary   5/30/2017    Katy Islas    MRN: [id number]           Patient Information     Date Of Birth          1990        Visit Information        Provider Department      5/30/2017 2:00 PM Aura Rosario PA-C; ASL IS [facility]        Today's Diagnoses     Chronic pain syndrome        Chronic bilateral low back pain with left-sided sciatica        Lumbar disc disease with radiculopathy        S/P lumbar fusion          Care Instructions    - Recheck 1 month           Follow-ups after your visit        Your next 10 appointments already scheduled     Jun 12, 2017 11:45 AM CDT   Office Visit with Aura Rosario PA-C, NL PROC ROOM 1ST FLOOR [facility] ([facility])    [address] [id number] [phone]           Bring a current list of meds and any records pertaining to this visit.  For Physicals, please bring immunization records and any forms needing to be filled out.  Please arrive 10 minutes early to complete paperwork.              Who to contact     If you have questions or need follow up information about today's clinic visit or your schedule please contact [facility] directly at [phone].  Normal or non-critical lab and imaging results will be communicated to you by MyChart, letter or phone within 4 business days after the clinic has received the results. If you do not hear from us within 7 days, please contact the clinic through [employer] or phone. If you have a critical or abnormal lab result, we will notify you by phone as soon as possible.  Submit refill requests through [employer] Everywhere or call your pharmacy and they will forward the refill request to us. Please allow 3 business days for your refill to be completed.          Additional Information About Your Visit        CO Everywhere Information     CO Everywhere gives you secure access to your electronic  health record. If you see a primary care provider, you can also send messages to your care team and make appointments. If you have questions, please call your primary care clinic.  If you do not have a primary care provider, please call [phone] and they will assist you.        Care EveryWhere ID     This is your Care EveryWhere ID. This could be used by other organizations to access your [place] medical records  [id number]        Your Vitals Were     Pulse Temperature Respirations Pulse Oximetry BMI (Body Mass Index)       66 98.1  F (36.7  C) (Oral) 10 99% 27.82 kg/m2        Blood Pressure from Last 3 Encounters:   05/30/17 120/62   05/29/17 119/80   05/25/17 116/80    Weight from Last 3 Encounters:   05/30/17 150 lb 12.8 oz (68.4 kg)   05/29/17 145 lb (65.8 kg)   05/25/17 149 lb (67.6 kg)              Today, you had the following     No orders found for display         Today's Medication Changes          These changes are accurate as of: 5/30/17  2:32 PM.  If you have any questions, ask your nurse or doctor.               Start taking these medicines.        Dose/Directions    naproxen 500 MG tablet   Commonly known as:  NAPROSYN   Used for:  Chronic pain syndrome, Lumbar disc disease with radiculopathy, S/P lumbar fusion, Chronic bilateral low back pain with left-sided sciatica   Started by:  Aura Rosario PA-C        Dose:  500 mg   Take 1 tablet (500 mg) by mouth 2 times daily (with meals)   Quantity:  180 tablet   Refills:  3         Stop taking these medicines if you haven't already. Please contact your care team if you have questions.     Hydrocodone-Ibuprofen 5-200 MG Tabs   Stopped by:  Aura Rosario PA-C                Where to get your medicines      These medications were sent to [facility] [place], [place] - [address]  [address] [facility] [address]     Phone:  [phone]     naproxen 500 MG tablet         Some of these will need a  paper prescription and others can be bought over the counter.  Ask your nurse if you have questions.     Bring a paper prescription for each of these medications     diazepam 5 MG tablet    oxyCODONE 5 MG IR tablet                Primary Care Provider Office Phone # Fax #    Aura EMILY Rosario PA-C [phone]       [facility] [address]  [facility] [address]        Thank you!     Thank you for choosing [facility]  for your care. Our goal is always to provide you with excellent care. Hearing back from our patients is one way we can continue to improve our services. Please take a few minutes to complete the written survey that you may receive in the mail after your visit with us. Thank you!             Your Updated Medication List - Protect others around you: Learn how to safely use, store and throw away your medicines at www.disposemymeds.org.          This list is accurate as of: 5/30/17  2:32 PM.  Always use your most recent med list.                   Brand Name Dispense Instructions for use    ALLEGRA 180 MG tablet   Generic drug:  fexofenadine      Take 180 mg by mouth daily Reported on 5/16/2017       ARIPiprazole 2 MG tablet    ABILIFY    90 tablet    Take 1 tablet (2 mg) by mouth At Bedtime       cyclobenzaprine 10 MG tablet    FLEXERIL    60 tablet    Take 1 tablet (10 mg) by mouth 2 times daily as needed for muscle spasms or other (back pain)       dexlansoprazole 60 MG Cpdr CR capsule    DEXILANT    90 capsule    Take 1 capsule (60 mg) by mouth daily       diazepam 5 MG tablet    VALIUM    20 tablet    Take 0.5-1 tablets (2.5-5 mg) by mouth every 6 hours as needed for anxiety or sleep (MUSCLE SPASM)       dicyclomine 20 MG tablet    BENTYL    30 tablet    Take 1 tablet (20 mg) by mouth 4 times daily as needed (ABDOMINAL CRAMPING) AS NEEDED FOR CRAMPING       DULoxetine 60 MG EC capsule    CYMBALTA    90 capsule    Take 1 capsule (60 mg)  by mouth daily       EPINEPHrine 0.3 MG/0.3ML injection     0.6 mL    Inject 0.3 mLs (0.3 mg) into the muscle once as needed for anaphylaxis       FLEX-A-MIN JOINT FLEX PO      Take by mouth daily Reported on 3/28/2017       fluticasone 50 MCG/ACT spray    FLONASE    1 Bottle    Spray 2 sprays into both nostrils daily       fluticasone-salmeterol 100-50 MCG/DOSE diskus inhaler    ADVAIR    1 Inhaler    Inhale 1 puff into the lungs 2 times daily       gabapentin 300 MG capsule    NEURONTIN    270 capsule    Take 1 capsule (300 mg) by mouth 3 times daily       lidocaine 5 % Patch    LIDODERM    30 patch    Apply up to 3 patches to painful area at once for up to 12 h within a 24 h period.  Remove after 12 hours.       meclizine 12.5 MG tablet    ANTIVERT    30 tablet    Take 1 tablet (12.5 mg) by mouth 4 times daily as needed for dizziness       medical cannabis inhalation (Patient's own supply.  Not a prescription)      Inhale into the lungs 3 times daily as needed Reported on 3/28/2017       Multi-vitamin Tabs tablet   Generic drug:  multivitamin, therapeutic with minerals      Take 1 tablet by mouth daily Reported on 3/28/2017       naproxen 500 MG tablet    NAPROSYN    180 tablet    Take 1 tablet (500 mg) by mouth 2 times daily (with meals)       ondansetron 4 MG ODT tab    ZOFRAN ODT    30 tablet    Take 1-2 tablets (4-8 mg) by mouth every 6 hours as needed for nausea Take 1-2 tablets by mouth as needed for nausea       oxyCODONE 5 MG IR tablet    ROXICODONE    150 tablet    Take 1 tablet (5 mg) by mouth every 4 hours as needed for pain maximum 5 tablet(s) per day       promethazine 25 MG tablet    PHENERGAN    60 tablet    Take 1 tablet (25 mg) by mouth every 6 hours as needed for nausea       * rizatriptan 5 MG ODT tab    MAXALT-MLT    18 tablet    Take 1-2 tablets (5-10 mg) by mouth at onset of headache for migraine May repeat in 2 hours. Max 6 tablets/24 hours.       * rizatriptan 5 MG tablet    MAXALT    18  tablet    Take 1-2 tablets (5-10 mg) by mouth at onset of headache for migraine May repeat in 2 hours. Max 6 tablets/24 hours.       senna-docusate 8.6-50 MG per tablet    SENOKOT-S;PERICOLACE    60 tablet    Take 1 tablet by mouth 2 times daily as needed for constipation       sucralfate 1 GM tablet    CARAFATE     Take by mouth 4 times daily Reported on 5/16/2017       SUMAtriptan 50 MG tablet    IMITREX    18 tablet    Take 1 tablet (50 mg) by mouth at onset of headache for migraine May repeat in 2 hours. Max 8 tablets/24 hours.       Vitamin D-3 5000 UNITS Tabs      Take by mouth daily Reported on 5/16/2017       * Notice:  This list has 2 medication(s) that are the same as other medications prescribed for you. Read the directions carefully, and ask your doctor or other care provider to review them with you.

## 2017-05-30 NOTE — TELEPHONE ENCOUNTER
Pt seen in ED 5/29 for back pain. MD advised to follow up with PCP within 3 days. No 45 min appt slots available, plz call pt for work in.

## 2017-05-30 NOTE — DISCHARGE INSTRUCTIONS
Thank you for giving us the opportunity to see you. The impression is that you have a lumbar muscular strain.    Continue with her current pain regimen.  Add Valium 2. 5-5 milligrams 4 times day for spasms.    Continue with normal activity but do not lift or strain your back.    If you are not seeing an improvement within 3 days, please follow up with your primary care provider or clinic.     After discharge, please closely monitor for any new or worsening symptoms. Return to the Emergency Department at any time if your symptoms worsen.        Back Sprain or Strain    Injury to the muscles (strain) or ligaments (sprain) around the spine can be troubling. Injury may occur after a sudden forceful twisting or bending force such as in a car accident, after a simple awkward movement, or after lifting something heavy with poor body positioning. In any case, muscle spasm is often present and adds to the pain.  Thankfully, most people feel better in 1 to 2 weeks, and most of the rest in 1 to 2 months. Most people can remain active. Unless you had a forceful physical injury such as a car accident or fall, X-rays are usually not ordered for the first evaluation of a back sprain or strain. If pain continues and does not respond to medical treatment, your healthcare provider may order X-rays and other tests.  Home care  The following guidelines will help you care for your injury at home:    When in bed, try to find a comfortable position. A firm mattress is best. Try lying flat on your back with pillows under your knees. You can also try lying on your side with your knees bent up toward your chest and a pillow between your knees.    Don't sit for long periods. Try not to take long car rides or take other trips that have you sitting for a long time. This puts more stress on the lower back than standing or walking.    During the first 24 to 72 hours after an injury or flare-up, apply an ice pack to the painful area for 20  minutes. Then remove it for 20 minutes. Do this for 60 to 90 minutes, or several times a day, This will reduce swelling and pain. Be sure to wrap the ice pack in a thin towel or plastic to protect your skin.    You can start with ice, then switch to heat. Heat from a hot shower, hot bath, or heating pad reduces swelling and pain and works well for muscle spasms. Put heat on the painful area for 20 minutes, then remove for 20 minutes. Do this for 60 to 90 minutes, or several times a day. Do not use a heating pad while sleeping. It can burn the skin.    You can alternate the ice and heat. Talk with your healthcare provider to find out the best treatment or therapy for your back pain.    Therapeutic massage will help relax the back muscles without stretching them.    Be aware of safe lifting methods. Do not lift anything over 15 pounds until all of the pain is gone.  Medicines  Talk to your healthcare provider before using medicines, especially if you have other health problems or are taking other medicines.    You may use acetaminophen or ibuprofen to control pain, unless another pain medicine was prescribed. If you have chronic conditions like diabetes, liver or kidney disease, stomach ulcers, or gastrointestinal bleeding, or are taking blood-thinner medicines, talk with your doctor before taking any medicines.    Be careful if you are given prescription medicines, narcotics, or medicine for muscle spasm. They can cause drowsiness, and affect your coordination, reflexes, and judgment. Do not drive or operate heavy machinery.  Follow-up care  Follow up with your healthcare provider or this facility as advised. You may need physical therapy or more tests if your symptoms get worse.  If you had X-rays today, they didn t show any broken bones, breaks, or fractures. Sometimes fractures don t show up on the first X-ray. Bruises and sprains can sometimes hurt as much as a fracture. These injuries can take time to heal  completely. If your symptoms don t improve or they get worse, talk with your healthcare provider. You may need a repeat X-ray.  Call 911  Call for emergency care if any of the following occur:    Trouble breathing    Confused    Very drowsy or trouble awakening    Fainting or loss of consciousness    Rapid or very slow heart rate    Loss of bowel or bladder control  When to seek medical advice  Call your healthcare provider right away if any of the following occur:    Pain gets worse or spreads to your arms or legs    Weakness or numbness in one or both arms or legs    Numbness in the groin or genital area    [id number] [employer]. [address] [place] [address]. All rights reserved. This information is not intended as a substitute for professional medical care. Always follow your healthcare professional's instructions.

## 2017-05-30 NOTE — ED PROVIDER NOTES
ED Provider Note     CC:     Chief Complaint   Patient presents with     Back Pain          History is obtained from the patient with sign interpretation by the mom.  Patient was offered a sign , and she declined.    HPI: Katy Islas is a 27 year old female with a history of hearing loss and recent fusion of the lumbar spine 3 months ago presenting with acute left-sided low back pain with a squeezing pain into the left leg.  Patient states that she and her mom have been moving, and she was working all day loading and packing boxes.  She also went for a run yesterday.  She continued to do some packing and moving this morning, and by this afternoon developed severe pain in the left low back region.  She states that this is the pain she has had in the past which was helped by Valium.  Patient is currently on hydrocodone and oxycodone.  Patient denies any bowel or bladder symptoms.  She reports that the pain radiates down the lateral thigh but not below the knee.  Pain is worse with movement.  She recently received medication refills from her primary care provider.          Patient Active Problem List   Diagnosis     Hearing loss     Allergic rhinitis     Migraine     Benign neoplasm of skin of lower limb, including hip     Dysmenorrhea     Crohn's colitis (H)     Fibromyalgia     Mild major depression (H)     Anxiety     Leg pain     Chronic pain     Drug-seeking behavior- See [facility]J notes from Dr Barton     Palpitations     Oral thrush     Poor appetite     Nausea and vomiting     Weight loss     Abdominal pain     Tension type headache     Elevated blood pressure reading without diagnosis of hypertension     Grand mal seizure disorder (H)     Right knee pain     Health Care Home     Bipolar disorder (H)     Marijuana abuse     Adjustment disorder with mixed anxiety and depressed mood     Controlled substance agreement terminated     Herpes  simplex virus infection     Gastroesophageal reflux disease without esophagitis     Bee sting allergy     Mild persistent asthma without complication     Chronic bilateral low back pain with left-sided sciatica     Vitamin D deficiency     Atypical mole     Lumbar disc disease with radiculopathy     S/P lumbar fusion     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)       MEDS:     No current facility-administered medications on file prior to encounter.   Current Outpatient Prescriptions on File Prior to Encounter:  rizatriptan (MAXALT) 5 MG tablet Take 1-2 tablets (5-10 mg) by mouth at onset of headache for migraine May repeat in 2 hours. Max 6 tablets/24 hours.   oxyCODONE (ROXICODONE) 5 MG IR tablet Take 1 tablet (5 mg) by mouth every 4 hours as needed for pain maximum 5 tablet(s) per day   rizatriptan (MAXALT-MLT) 5 MG ODT tab Take 1-2 tablets (5-10 mg) by mouth at onset of headache for migraine May repeat in 2 hours. Max 6 tablets/24 hours.   Hydrocodone-Ibuprofen 5-200 MG TABS Take 1-2 tablets by mouth 3 times daily as needed (Max 5 per day)   ondansetron (ZOFRAN ODT) 4 MG ODT tab Take 1-2 tablets (4-8 mg) by mouth every 6 hours as needed for nausea Take 1-2 tablets by mouth as needed for nausea   HYDROcodone-acetaminophen (NORCO) 5-325 MG per tablet Take 1-2 tablets by mouth 3 times daily as needed for moderate to severe pain maximum 6 tablet(s) per day   dicyclomine (BENTYL) 20 MG tablet Take 1 tablet (20 mg) by mouth 4 times daily as needed (ABDOMINAL CRAMPING) AS NEEDED FOR CRAMPING   dexlansoprazole (DEXILANT) 60 MG CPDR CR capsule Take 1 capsule (60 mg) by mouth daily   gabapentin (NEURONTIN) 300 MG capsule Take 1 capsule (300 mg) by mouth 3 times daily   lidocaine (LIDODERM) 5 % Patch Apply up to 3 patches to painful area at once for up to 12 h within a 24 h period.  Remove after 12 hours.   cyclobenzaprine (FLEXERIL) 10 MG tablet Take 1 tablet (10 mg) by mouth 2 times daily as needed for muscle  spasms or other (back pain)   senna-docusate (SENOKOT-S;PERICOLACE) 8.6-50 MG per tablet Take 1 tablet by mouth 2 times daily as needed for constipation   SUMAtriptan (IMITREX) 50 MG tablet Take 1 tablet (50 mg) by mouth at onset of headache for migraine May repeat in 2 hours. Max 8 tablets/24 hours.   meclizine (ANTIVERT) 12.5 MG tablet Take 1 tablet (12.5 mg) by mouth 4 times daily as needed for dizziness   promethazine (PHENERGAN) 25 MG tablet Take 1 tablet (25 mg) by mouth every 6 hours as needed for nausea   fluticasone (FLONASE) 50 MCG/ACT spray Spray 2 sprays into both nostrils daily   medical cannabis inhalation (Patient's own supply.  Not a prescription) Inhale into the lungs 3 times daily as needed Reported on 3/28/2017   ARIPiprazole (ABILIFY) 2 MG tablet Take 1 tablet (2 mg) by mouth At Bedtime   DULoxetine (CYMBALTA) 60 MG EC capsule Take 1 capsule (60 mg) by mouth daily   fluticasone-salmeterol (ADVAIR) 100-50 MCG/DOSE diskus inhaler Inhale 1 puff into the lungs 2 times daily   sucralfate (CARAFATE) 1 GM tablet Take by mouth 4 times daily Reported on 5/16/2017   EPINEPHrine (EPIPEN) 0.3 MG/0.3ML injection Inject 0.3 mLs (0.3 mg) into the muscle once as needed for anaphylaxis   Cholecalciferol (VITAMIN D-3) 5000 UNITS TABS Take by mouth daily Reported on 5/16/2017   Misc Natural Products (FLEX-A-MIN JOINT FLEX PO) Take by mouth daily Reported on 3/28/2017   fexofenadine (ALLEGRA) 180 MG tablet Take 180 mg by mouth daily Reported on 5/16/2017   Multiple Vitamin (MULTI-VITAMIN) per tablet Take 1 tablet by mouth daily Reported on 3/28/2017       Allergies: Ativan [lorazepam]; Baclofen; Bees; Caffeine; Contrast dye; Diazepam; Iodine; Methocarbamol; Metoclopramide; Midazolam; Monosodium glutamate; Morphine; Other (do not use); Percocet [oxycodone-acetaminophen]; Reglan [metoclopramide hcl]; Soma [carisoprodol]; Tizanidine; Topamax; Topamax [topiramate]; Tramadol; Tylenol w/codeine [acetaminophen-codeine];  Valium; Versed; Zolmitriptan; Droperidol; and Flu virus vaccine    Triage and ursing notes were reviewed.    ROS: Negative except in HPI    Physical Exam:  Vitals:    05/29/17 1750 05/29/17 2056   BP: 119/80    Pulse: 82 78   Resp: 16 16   Temp: 97.8  F (36.6  C)    TempSrc: Temporal    SpO2: 100%    Weight: 65.8 kg (145 lb)      GENERAL APPEARANCE: Alert, moderate distress with movement   HEAD: atraumatic  EYES: PER  HENT: Normal external exam  RESP: Normal respiratory effort  EXT: No calf pain or ankle edema; positive straight leg raise at 30   SKIN: no rash; as above  NEURO: Motor and sensation is intact    No results found. However, due to the size of the patient record, not all encounters were searched. Please check Results Review for a complete set of results.        Impression:  Final diagnoses:   Strain of lumbar region, initial encounter         ED Course & Medical Decision Making (Plan):  Katy Islas is a 27 year old female with left low back pain with radiation into the left thigh after 2 days of packing, and moving boxes.  She did not have a particular injury, and in fact was still packing in moving boxes this morning.  This afternoon however she could not move comfortably.  She did not have any bowel or bladder symptoms and has no suspicion for cauda equina.  The mechanism and the progression of her symptoms are consistent with mechanical low back pain.  Patient likely has a strain to the lumbar region.  She really has pain medications, and I recommended one to 2 days of a muscle relaxer.  She also has lidocaine patches at home that she can use.  I asked her to avoid any lifting and moving of boxes, but should remain active with normal activities.  Mom was present throughout the visit and the patient and her mom understand the care plan.  Recheck in the clinic in 3 days if not improving.  Patient received her 1st dose of Valium 5 mg in the emergency department.  Written after-visit summary and  instructions were given at the time of discharge.          Discharge Medication List as of 5/29/2017  8:56 PM      START taking these medications    Details   diazepam (VALIUM) 5 MG tablet Take 0.5-1 tablets (2.5-5 mg) by mouth every 6 hours as needed for anxiety or sleep (MUSCLE SPASM), Disp-8 tablet, R-0, Local Print                 This note was completed in part using Dragon voice recognition, and may contain word and grammatical errors.        Emily Shcerer MD  05/30/17 0327

## 2017-05-30 NOTE — PROGRESS NOTES
"Hearing Aid Check    SUBJECTIVE:  Katy Islas is a 27 year old female, was seen today for a hearing aid check and for the fitting of her remade earmolds. Patient reports that she is doing well with her hearing aids, as they really help her speech read much better.       OBJECTIVE:  Otoscopy revealed clear ear canals bilaterally.     Data loggin.5 hrs/day    Global gain: taken from 90% to 100% today.     Physical fit of the earmolds was excellent, and patient reported good comfort.     Real Ear Measures were completed and revealed an excellent match to NAL-NL2 targets for the right ear and good match to NAL-NL2 targets in the left ear. Patient reports \"perfect\" sound quality and volume.     Patient was also given a Dry n Store jar upon request; this will be billed to patient's insurance.       PLAN: Return to clinic as needed.        Natalie Zimmerman, F-AAA   Clinical Audiologist, [place] #[id number]   2017        "
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
## 2017-06-04 ENCOUNTER — MYC MEDICAL ADVICE (OUTPATIENT)
Dept: FAMILY MEDICINE | Facility: OTHER | Age: 27
End: 2017-06-04

## 2017-06-04 DIAGNOSIS — M54.42 CHRONIC BILATERAL LOW BACK PAIN WITH LEFT-SIDED SCIATICA: ICD-10-CM

## 2017-06-04 DIAGNOSIS — Z98.1 S/P LUMBAR FUSION: ICD-10-CM

## 2017-06-04 DIAGNOSIS — G89.29 CHRONIC BILATERAL LOW BACK PAIN WITH LEFT-SIDED SCIATICA: ICD-10-CM

## 2017-06-04 DIAGNOSIS — G89.4 CHRONIC PAIN SYNDROME: ICD-10-CM

## 2017-06-04 DIAGNOSIS — M51.16 LUMBAR DISC DISEASE WITH RADICULOPATHY: ICD-10-CM

## 2017-06-05 RX ORDER — OXYCODONE HYDROCHLORIDE 5 MG/1
5 TABLET ORAL EVERY 4 HOURS PRN
Qty: 150 TABLET | Refills: 0 | Status: CANCELLED | OUTPATIENT
Start: 2017-06-05

## 2017-06-05 RX ORDER — HYDROCODONE BITARTRATE AND IBUPROFEN 5; 200 MG/1; MG/1
1-2 TABLET ORAL 3 TIMES DAILY PRN
Qty: 15 TABLET | Refills: 0 | Status: SHIPPED | OUTPATIENT
Start: 2017-06-05 | End: 2017-06-23
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
## 2017-06-08 ENCOUNTER — MYC MEDICAL ADVICE (OUTPATIENT)
Dept: FAMILY MEDICINE | Facility: OTHER | Age: 27
End: 2017-06-08

## 2017-06-08 NOTE — TELEPHONE ENCOUNTER
Routing to JM to review. CDL to review and advise when she returns to clinic. Nohemi Jennings RN, BSN

## 2017-06-12 ENCOUNTER — OFFICE VISIT (OUTPATIENT)
Dept: FAMILY MEDICINE | Facility: OTHER | Age: 27
End: 2017-06-12
Payer: MEDICARE

## 2017-06-12 VITALS
RESPIRATION RATE: 16 BRPM | BODY MASS INDEX: 27.31 KG/M2 | TEMPERATURE: 98.3 F | WEIGHT: 148 LBS | DIASTOLIC BLOOD PRESSURE: 84 MMHG | HEART RATE: 86 BPM | SYSTOLIC BLOOD PRESSURE: 130 MMHG | OXYGEN SATURATION: 99 %

## 2017-06-12 DIAGNOSIS — F43.23 ADJUSTMENT DISORDER WITH MIXED ANXIETY AND DEPRESSED MOOD: ICD-10-CM

## 2017-06-12 DIAGNOSIS — M54.42 CHRONIC BILATERAL LOW BACK PAIN WITH LEFT-SIDED SCIATICA: Primary | ICD-10-CM

## 2017-06-12 DIAGNOSIS — F33.0 MAJOR DEPRESSIVE DISORDER, RECURRENT EPISODE, MILD (H): ICD-10-CM

## 2017-06-12 DIAGNOSIS — F41.9 ANXIETY: ICD-10-CM

## 2017-06-12 DIAGNOSIS — Z98.1 S/P LUMBAR FUSION: ICD-10-CM

## 2017-06-12 DIAGNOSIS — G89.4 CHRONIC PAIN SYNDROME: ICD-10-CM

## 2017-06-12 DIAGNOSIS — G89.29 CHRONIC BILATERAL LOW BACK PAIN WITH LEFT-SIDED SCIATICA: Primary | ICD-10-CM

## 2017-06-12 DIAGNOSIS — F31.70 BIPOLAR AFFECTIVE DISORDER IN REMISSION (H): ICD-10-CM

## 2017-06-12 PROCEDURE — T1013 SIGN LANG/ORAL INTERPRETER: HCPCS | Mod: U3

## 2017-06-12 PROCEDURE — 99214 OFFICE O/P EST MOD 30 MIN: CPT | Performed by: PHYSICIAN ASSISTANT

## 2017-06-12 ASSESSMENT — PAIN SCALES - GENERAL: PAINLEVEL: MODERATE PAIN (4)

## 2017-06-12 NOTE — MR AVS SNAPSHOT
After Visit Summary   6/12/2017    Katy Islas    MRN: [id number]           Patient Information     Date Of Birth          1990        Visit Information        Provider Department      6/12/2017 11:45 AM Dayana Roldan; Aura Rosario, LOVE; NL PROC ROOM 1ST FLOOR [facility]        Today's Diagnoses     Chronic bilateral low back pain with left-sided sciatica    -  1    S/P lumbar fusion        Chronic pain syndrome        Bipolar affective disorder in remission (H)        Major depressive disorder, recurrent episode, mild (H)        Adjustment disorder with mixed anxiety and depressed mood        Anxiety           Follow-ups after your visit        Who to contact     If you have questions or need follow up information about today's clinic visit or your schedule please contact [facility] directly at [phone].  Normal or non-critical lab and imaging results will be communicated to you by [employer], letter or phone within 4 business days after the clinic has received the results. If you do not hear from us within 7 days, please contact the clinic through [employer] or phone. If you have a critical or abnormal lab result, we will notify you by phone as soon as possible.  Submit refill requests through [employer] or call your pharmacy and they will forward the refill request to us. Please allow 3 business days for your refill to be completed.          Additional Information About Your Visit        MyChart Information     [employer] gives you secure access to your electronic health record. If you see a primary care provider, you can also send messages to your care team and make appointments. If you have questions, please call your primary care clinic.  If you do not have a primary care provider, please call [phone] and they will assist you.        Care EveryWhere ID     This is your Care EveryWhere ID. This could be used by other organizations to  access your [place] medical records  [id number]        Your Vitals Were     Pulse Temperature Respirations Pulse Oximetry BMI (Body Mass Index)       86 98.3  F (36.8  C) (Oral) 16 99% 27.31 kg/m2        Blood Pressure from Last 3 Encounters:   06/12/17 130/84   05/30/17 120/62   05/29/17 119/80    Weight from Last 3 Encounters:   06/12/17 148 lb (67.1 kg)   05/30/17 150 lb 12.8 oz (68.4 kg)   05/29/17 145 lb (65.8 kg)              Today, you had the following     No orders found for display       Primary Care Provider Office Phone # Fax #    Aura EMILY Rosario PA-C [phone]       [facility] [address]  [facility] [address]        Thank you!     Thank you for choosing [facility]  for your care. Our goal is always to provide you with excellent care. Hearing back from our patients is one way we can continue to improve our services. Please take a few minutes to complete the written survey that you may receive in the mail after your visit with us. Thank you!             Your Updated Medication List - Protect others around you: Learn how to safely use, store and throw away your medicines at www.disposemymeds.org.          This list is accurate as of: 6/12/17 11:59 PM.  Always use your most recent med list.                   Brand Name Dispense Instructions for use    ALLEGRA 180 MG tablet   Generic drug:  fexofenadine      Take 180 mg by mouth daily Reported on 5/16/2017       ARIPiprazole 2 MG tablet    ABILIFY    90 tablet    Take 1 tablet (2 mg) by mouth At Bedtime       cyclobenzaprine 10 MG tablet    FLEXERIL    60 tablet    Take 1 tablet (10 mg) by mouth 2 times daily as needed for muscle spasms or other (back pain)       dexlansoprazole 60 MG Cpdr CR capsule    DEXILANT    90 capsule    Take 1 capsule (60 mg) by mouth daily       diazepam 5 MG tablet    VALIUM    20 tablet    Take 0.5-1 tablets (2.5-5 mg) by mouth every 6 hours as needed for  anxiety or sleep (MUSCLE SPASM)       dicyclomine 20 MG tablet    BENTYL    30 tablet    Take 1 tablet (20 mg) by mouth 4 times daily as needed (ABDOMINAL CRAMPING) AS NEEDED FOR CRAMPING       DULoxetine 60 MG EC capsule    CYMBALTA    90 capsule    Take 1 capsule (60 mg) by mouth daily       EPINEPHrine 0.3 MG/0.3ML injection     0.6 mL    Inject 0.3 mLs (0.3 mg) into the muscle once as needed for anaphylaxis       FLEX-A-MIN JOINT FLEX PO      Take by mouth daily Reported on 3/28/2017       fluticasone 50 MCG/ACT spray    FLONASE    1 Bottle    Spray 2 sprays into both nostrils daily       fluticasone-salmeterol 100-50 MCG/DOSE diskus inhaler    ADVAIR    1 Inhaler    Inhale 1 puff into the lungs 2 times daily       gabapentin 300 MG capsule    NEURONTIN    270 capsule    Take 1 capsule (300 mg) by mouth 3 times daily       Hydrocodone-Ibuprofen 5-200 MG Tabs     15 tablet    Take 1-2 tablets by mouth 3 times daily as needed (Max 5 per day)       lidocaine 5 % Patch    LIDODERM    30 patch    Apply up to 3 patches to painful area at once for up to 12 h within a 24 h period.  Remove after 12 hours.       meclizine 12.5 MG tablet    ANTIVERT    30 tablet    Take 1 tablet (12.5 mg) by mouth 4 times daily as needed for dizziness       medical cannabis inhalation (Patient's own supply.  Not a prescription)      Inhale into the lungs 3 times daily as needed Reported on 3/28/2017       Multi-vitamin Tabs tablet   Generic drug:  multivitamin, therapeutic with minerals      Take 1 tablet by mouth daily Reported on 3/28/2017       naproxen 500 MG tablet    NAPROSYN    180 tablet    Take 1 tablet (500 mg) by mouth 2 times daily (with meals)       ondansetron 4 MG ODT tab    ZOFRAN ODT    30 tablet    Take 1-2 tablets (4-8 mg) by mouth every 6 hours as needed for nausea Take 1-2 tablets by mouth as needed for nausea       promethazine 25 MG tablet    PHENERGAN    60 tablet    Take 1 tablet (25 mg) by mouth every 6 hours as  needed for nausea       * rizatriptan 5 MG ODT tab    MAXALT-MLT    18 tablet    Take 1-2 tablets (5-10 mg) by mouth at onset of headache for migraine May repeat in 2 hours. Max 6 tablets/24 hours.       * rizatriptan 5 MG tablet    MAXALT    18 tablet    Take 1-2 tablets (5-10 mg) by mouth at onset of headache for migraine May repeat in 2 hours. Max 6 tablets/24 hours.       senna-docusate 8.6-50 MG per tablet    SENOKOT-S;PERICOLACE    60 tablet    Take 1 tablet by mouth 2 times daily as needed for constipation       sucralfate 1 GM tablet    CARAFATE     Take by mouth 4 times daily Reported on 5/16/2017       SUMAtriptan 50 MG tablet    IMITREX    18 tablet    Take 1 tablet (50 mg) by mouth at onset of headache for migraine May repeat in 2 hours. Max 8 tablets/24 hours.       Vitamin D-3 5000 UNITS Tabs      Take by mouth daily Reported on 5/16/2017       * Notice:  This list has 2 medication(s) that are the same as other medications prescribed for you. Read the directions carefully, and ask your doctor or other care provider to review them with you.

## 2017-06-12 NOTE — NURSING NOTE
"Chief Complaint   Patient presents with     Lesion Removal     Panel Management     phq9, act, ua       Initial /84  Pulse 86  Temp 98.3  F (36.8  C) (Oral)  Resp 16  Wt 148 lb (67.1 kg)  SpO2 99%  BMI 27.31 kg/m2 Estimated body mass index is 27.31 kg/(m^2) as calculated from the following:    Height as of 5/8/17: 5' 1.73\" (1.568 m).    Weight as of this encounter: 148 lb (67.1 kg).  Medication Reconciliation: complete  "

## 2017-06-14 ENCOUNTER — MYC MEDICAL ADVICE (OUTPATIENT)
Dept: FAMILY MEDICINE | Facility: OTHER | Age: 27
End: 2017-06-14

## 2017-06-14 NOTE — TELEPHONE ENCOUNTER
[employer] message sent. 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  Abimbola Castro, CMA

## 2017-06-16 ENCOUNTER — HOSPITAL ENCOUNTER (EMERGENCY)
Facility: CLINIC | Age: 27
Discharge: HOME | End: 2017-06-16
Attending: PHYSICIAN ASSISTANT | Admitting: PHYSICIAN ASSISTANT
Payer: COMMERCIAL

## 2017-06-16 ENCOUNTER — MYC MEDICAL ADVICE (OUTPATIENT)
Dept: FAMILY MEDICINE | Facility: OTHER | Age: 27
End: 2017-06-16

## 2017-06-16 ENCOUNTER — APPOINTMENT (OUTPATIENT)
Dept: GENERAL RADIOLOGY | Facility: CLINIC | Age: 27
End: 2017-06-16
Attending: PHYSICIAN ASSISTANT
Payer: COMMERCIAL

## 2017-06-16 VITALS
WEIGHT: 140 LBS | OXYGEN SATURATION: 100 % | TEMPERATURE: 98.4 F | BODY MASS INDEX: 25.83 KG/M2 | RESPIRATION RATE: 16 BRPM | SYSTOLIC BLOOD PRESSURE: 105 MMHG | DIASTOLIC BLOOD PRESSURE: 77 MMHG | HEART RATE: 80 BPM
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
## 2017-06-16 DIAGNOSIS — M62.830 BACK MUSCLE SPASM: ICD-10-CM
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
## 2017-06-16 DIAGNOSIS — G89.4 CHRONIC PAIN SYNDROME: ICD-10-CM

## 2017-06-16 DIAGNOSIS — Z98.1 S/P LUMBAR FUSION: Primary | ICD-10-CM

## 2017-06-16 DIAGNOSIS — S13.4XXA WHIPLASH INJURY TO NECK, INITIAL ENCOUNTER: ICD-10-CM

## 2017-06-16 PROCEDURE — 99285 EMERGENCY DEPT VISIT HI MDM: CPT | Mod: 25 | Performed by: PHYSICIAN ASSISTANT

## 2017-06-16 PROCEDURE — 96372 THER/PROPH/DIAG INJ SC/IM: CPT | Performed by: PHYSICIAN ASSISTANT

## 2017-06-16 PROCEDURE — 72072 X-RAY EXAM THORAC SPINE 3VWS: CPT | Mod: TC

## 2017-06-16 PROCEDURE — 99285 EMERGENCY DEPT VISIT HI MDM: CPT | Mod: Z6 | Performed by: PHYSICIAN ASSISTANT

## 2017-06-16 PROCEDURE — 25000128 H RX IP 250 OP 636: Performed by: PHYSICIAN ASSISTANT

## 2017-06-16 PROCEDURE — 72040 X-RAY EXAM NECK SPINE 2-3 VW: CPT | Mod: TC

## 2017-06-16 RX ORDER — OXYCODONE HYDROCHLORIDE 5 MG/1
5 TABLET ORAL EVERY 6 HOURS PRN
Qty: 28 TABLET | Refills: 0 | Status: SHIPPED | OUTPATIENT
Start: 2017-06-16 | End: 2017-06-23

## 2017-06-16 RX ORDER — DIAZEPAM 5 MG
5 TABLET ORAL EVERY 6 HOURS PRN
Qty: 10 TABLET | Refills: 0 | Status: SHIPPED | OUTPATIENT
Start: 2017-06-16 | End: 2017-06-19

## 2017-06-16 RX ADMIN — HYDROMORPHONE HYDROCHLORIDE 1 MG: 1 INJECTION, SOLUTION INTRAMUSCULAR; INTRAVENOUS; SUBCUTANEOUS at 18:42

## 2017-06-16 ASSESSMENT — ENCOUNTER SYMPTOMS
NUMBNESS: 1
NECK PAIN: 1
VOMITING: 0
HEADACHES: 1
SHORTNESS OF BREATH: 0
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
## 2017-06-16 NOTE — TELEPHONE ENCOUNTER
Responded via MyChart and script brought up to  by Yahaira. Nandini Peoples CMA ([facility])

## 2017-06-16 NOTE — ED AVS SNAPSHOT
[facility] Emergency Department    [address] [place] [address]    Phone:  [phone]    Fax:  [phone]                                       Katy Islas   MRN: [id number]    Department:  [facility] Emergency Department   Date of Visit:  6/16/2017           After Visit Summary Signature Page     I have received my discharge instructions, and my questions have been answered. I have discussed any challenges I see with this plan with the nurse or doctor.    ..........................................................................................................................................  Patient/Patient Representative Signature      ..........................................................................................................................................  Patient Representative Print Name and Relationship to Patient    ..................................................               ................................................  Date                                            Time    ..........................................................................................................................................  Reviewed by Signature/Title    ...................................................              ..............................................  Date                                                            Time

## 2017-06-16 NOTE — TELEPHONE ENCOUNTER
Attempted to contact patient by phone and voicemail states patient unavailable. Need to attempt to contact at a later time if patient has not read [facility] message.  Amber Tirado CMA

## 2017-06-16 NOTE — TELEPHONE ENCOUNTER
Replied via [employer].     Let patient know we would have to switch her to oxycodone 5 mg.   I am awaiting her reply. If she responds after I leave clinic, please route to team. I am approving Oxycodone 5 mg max 4/day, quantity #28 to get her to her next appointment.   If she doesn't want to switch to oxycodone then will need to continue with the Hydrocodone-Ibuprofen I have prescribed.     Zach Rosario PA-C  [facility]
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
## 2017-06-16 NOTE — TELEPHONE ENCOUNTER
May  RX for oxycodone 5 mg max 4/day, quantity #28. In MA basket.   Thank you  Jacqueline Luciano CNP

## 2017-06-17 NOTE — ED NOTES
Bedside report completed, report received from Mariaelena REDDY.  Pt states that pain is now a 4/10, had been a 6/10 prior to pain medication. Pt remains in a C-collar at this time.  No further requests.  Call light within reach.

## 2017-06-17 NOTE — DISCHARGE INSTRUCTIONS
Use your home pain medications to manage your symptoms. Apply ice to your neck.  Use the Valium prescribed for muscle spasm.  Follow-up with your primary care provider as needed for reevaluation.  Return to the emergency department for worsening symptoms.    Thank you for choosing [facility]. It was a pleasure taking care of you today. If you have any questions, please call [phone].    Marian Barton PA-C

## 2017-06-18 ENCOUNTER — MYC MEDICAL ADVICE (OUTPATIENT)
Dept: FAMILY MEDICINE | Facility: OTHER | Age: 27
End: 2017-06-18

## 2017-06-19 ENCOUNTER — MYC MEDICAL ADVICE (OUTPATIENT)
Dept: FAMILY MEDICINE | Facility: OTHER | Age: 27
End: 2017-06-19

## 2017-06-19 ENCOUNTER — RADIANT APPOINTMENT (OUTPATIENT)
Dept: GENERAL RADIOLOGY | Facility: OTHER | Age: 27
End: 2017-06-19
Attending: PHYSICIAN ASSISTANT
Payer: MEDICARE

## 2017-06-19 ENCOUNTER — OFFICE VISIT (OUTPATIENT)
Dept: FAMILY MEDICINE | Facility: OTHER | Age: 27
End: 2017-06-19
Payer: MEDICARE

## 2017-06-19 ENCOUNTER — TELEPHONE (OUTPATIENT)
Dept: FAMILY MEDICINE | Facility: OTHER | Age: 27
End: 2017-06-19

## 2017-06-19 VITALS
TEMPERATURE: 99 F | WEIGHT: 149 LBS | HEART RATE: 105 BPM | SYSTOLIC BLOOD PRESSURE: 122 MMHG | BODY MASS INDEX: 27.49 KG/M2 | DIASTOLIC BLOOD PRESSURE: 76 MMHG | OXYGEN SATURATION: 100 % | RESPIRATION RATE: 16 BRPM

## 2017-06-19 DIAGNOSIS — K21.9 GASTROESOPHAGEAL REFLUX DISEASE WITHOUT ESOPHAGITIS: ICD-10-CM

## 2017-06-19 DIAGNOSIS — G89.29 CHRONIC BILATERAL LOW BACK PAIN WITH LEFT-SIDED SCIATICA: ICD-10-CM

## 2017-06-19 DIAGNOSIS — G89.4 CHRONIC PAIN SYNDROME: ICD-10-CM

## 2017-06-19 DIAGNOSIS — Z98.1 S/P LUMBAR FUSION: ICD-10-CM
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
## 2017-06-19 DIAGNOSIS — M54.42 CHRONIC BILATERAL LOW BACK PAIN WITH LEFT-SIDED SCIATICA: ICD-10-CM

## 2017-06-19 PROCEDURE — 99214 OFFICE O/P EST MOD 30 MIN: CPT | Performed by: PHYSICIAN ASSISTANT

## 2017-06-19 PROCEDURE — 72100 X-RAY EXAM L-S SPINE 2/3 VWS: CPT

## 2017-06-19 RX ORDER — PANTOPRAZOLE SODIUM 40 MG/1
40 TABLET, DELAYED RELEASE ORAL DAILY
Qty: 90 TABLET | Refills: 3 | Status: SHIPPED | OUTPATIENT
Start: 2017-06-19 | End: 2017-10-24

## 2017-06-19 RX ORDER — DIAZEPAM 5 MG
5 TABLET ORAL EVERY 6 HOURS PRN
Qty: 10 TABLET | Refills: 0 | Status: SHIPPED | OUTPATIENT
Start: 2017-06-19 | End: 2017-06-22

## 2017-06-19 ASSESSMENT — PAIN SCALES - GENERAL: PAINLEVEL: MODERATE PAIN (5)
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
## 2017-06-19 NOTE — TELEPHONE ENCOUNTER
Please advise patient that will need appointment to switch medication/ED follow up and SEPARATE appointment for mole removal.    Zach Rosario PA-C  [facility] [place]

## 2017-06-19 NOTE — NURSING NOTE
"Chief Complaint   Patient presents with     [facility] F/U     Health Maintenance       Initial /76 (BP Location: Right arm, Patient Position: Chair, Cuff Size: Adult Regular)  Pulse 105  Temp 99  F (37.2  C) (Oral)  Resp 16  Wt 149 lb (67.6 kg)  LMP 06/03/2017 (Exact Date)  SpO2 100%  BMI 27.49 kg/m2 Estimated body mass index is 27.49 kg/(m^2) as calculated from the following:    Height as of 5/8/17: 5' 1.73\" (1.568 m).    Weight as of this encounter: 149 lb (67.6 kg).  Medication Reconciliation: complete   Oneyda Tapia CMA (AAMA)      "

## 2017-06-19 NOTE — TELEPHONE ENCOUNTER
Sent my chart to patient. Did not leave urine sample.   Needs to do this prior to recheck for consideration of refills.    Zach Rosario PA-C  [facility]
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
## 2017-06-21 ENCOUNTER — TELEPHONE (OUTPATIENT)
Dept: FAMILY MEDICINE | Facility: OTHER | Age: 27
End: 2017-06-21

## 2017-06-21 NOTE — TELEPHONE ENCOUNTER
Patient's  called thru the line. She had a misunderstanding about her appointment. She thought it was scheduled for this Friday instead of next Friday. She is wanting to be seen this week if at all possible to squeeze her into a slot Thursday or Friday. Please follow up with patient to see if she can be squeezed into the schedule. Thank you. [phone] ([place])     Central SchedulerAl

## 2017-06-22 ENCOUNTER — MYC MEDICAL ADVICE (OUTPATIENT)
Dept: FAMILY MEDICINE | Facility: OTHER | Age: 27
End: 2017-06-22

## 2017-06-22 DIAGNOSIS — M54.42 CHRONIC BILATERAL LOW BACK PAIN WITH LEFT-SIDED SCIATICA: ICD-10-CM

## 2017-06-22 DIAGNOSIS — Z98.1 S/P LUMBAR FUSION: ICD-10-CM

## 2017-06-22 DIAGNOSIS — G89.29 CHRONIC BILATERAL LOW BACK PAIN WITH LEFT-SIDED SCIATICA: ICD-10-CM

## 2017-06-22 DIAGNOSIS — Z87.19 HX OF CROHN'S DISEASE: ICD-10-CM
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
## 2017-06-22 DIAGNOSIS — G89.4 CHRONIC PAIN SYNDROME: ICD-10-CM

## 2017-06-22 PROCEDURE — 80307 DRUG TEST PRSMV CHEM ANLYZR: CPT | Mod: 90 | Performed by: PHYSICIAN ASSISTANT

## 2017-06-22 RX ORDER — PROMETHAZINE HYDROCHLORIDE 25 MG/1
25 TABLET ORAL EVERY 6 HOURS PRN
Qty: 60 TABLET | Refills: 3 | Status: SHIPPED | OUTPATIENT
Start: 2017-06-22 | End: 2017-10-24

## 2017-06-22 RX ORDER — DIAZEPAM 5 MG
5 TABLET ORAL EVERY 6 HOURS PRN
Qty: 30 TABLET | Refills: 0 | Status: SHIPPED | OUTPATIENT
Start: 2017-06-22 | End: 2017-06-30
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
## 2017-06-22 NOTE — TELEPHONE ENCOUNTER
Will route to provider to review and advise on refills of phenegran and valium. Tasneem Weinberg, CMA

## 2017-06-22 NOTE — TELEPHONE ENCOUNTER
Informed patient she is coming in tomorrow at 10:00 am to see CDL for follow up on pain meds.  Valium walked to our pharmacy  She knows its NOT for mole removal.... She had me cancel her appt on 6/30/17 for mole removal and will reschedule it tomorrow when here.

## 2017-06-22 NOTE — TELEPHONE ENCOUNTER
Refills given. Valium signed and placed in MA task.  Please let patient know whoever told her I was gone is wrong. I have openings both today and tomorrow if she needs to see me (30 min ok. Book for 45 min, ok for DR only)  Also please let her know if she is managing on Valium only, I would like her to continue this. If she can stay off pain medications we can then just taper the Valium.     Zach Rosario PA-C  [facility]

## 2017-06-23 ENCOUNTER — OFFICE VISIT (OUTPATIENT)
Dept: FAMILY MEDICINE | Facility: OTHER | Age: 27
End: 2017-06-23
Payer: COMMERCIAL

## 2017-06-23 VITALS
DIASTOLIC BLOOD PRESSURE: 86 MMHG | HEART RATE: 112 BPM | BODY MASS INDEX: 27.49 KG/M2 | TEMPERATURE: 98.2 F | WEIGHT: 149 LBS | OXYGEN SATURATION: 100 % | RESPIRATION RATE: 12 BRPM | SYSTOLIC BLOOD PRESSURE: 130 MMHG

## 2017-06-23 DIAGNOSIS — Z98.1 S/P LUMBAR FUSION: ICD-10-CM

## 2017-06-23 DIAGNOSIS — M54.42 CHRONIC BILATERAL LOW BACK PAIN WITH LEFT-SIDED SCIATICA: ICD-10-CM

## 2017-06-23 DIAGNOSIS — G89.29 CHRONIC BILATERAL LOW BACK PAIN WITH LEFT-SIDED SCIATICA: ICD-10-CM

## 2017-06-23 DIAGNOSIS — G89.4 CHRONIC PAIN SYNDROME: Primary | ICD-10-CM

## 2017-06-23 DIAGNOSIS — M51.16 LUMBAR DISC DISEASE WITH RADICULOPATHY: ICD-10-CM

## 2017-06-23 PROCEDURE — T1013 SIGN LANG/ORAL INTERPRETER: HCPCS | Mod: U3

## 2017-06-23 PROCEDURE — 99214 OFFICE O/P EST MOD 30 MIN: CPT | Performed by: PHYSICIAN ASSISTANT

## 2017-06-23 RX ORDER — HYDROCODONE BITARTRATE AND IBUPROFEN 5; 200 MG/1; MG/1
1-2 TABLET ORAL 3 TIMES DAILY PRN
Qty: 90 TABLET | Refills: 0 | Status: SHIPPED | OUTPATIENT
Start: 2017-06-23 | End: 2017-06-26

## 2017-06-23 ASSESSMENT — ANXIETY QUESTIONNAIRES
GAD7 TOTAL SCORE: 4
1. FEELING NERVOUS, ANXIOUS, OR ON EDGE: SEVERAL DAYS
GAD7 TOTAL SCORE: 4
GAD7 TOTAL SCORE: 4
2. NOT BEING ABLE TO STOP OR CONTROL WORRYING: NOT AT ALL
3. WORRYING TOO MUCH ABOUT DIFFERENT THINGS: NOT AT ALL
7. FEELING AFRAID AS IF SOMETHING AWFUL MIGHT HAPPEN: NOT AT ALL
6. BECOMING EASILY ANNOYED OR IRRITABLE: SEVERAL DAYS
4. TROUBLE RELAXING: SEVERAL DAYS
7. FEELING AFRAID AS IF SOMETHING AWFUL MIGHT HAPPEN: NOT AT ALL
5. BEING SO RESTLESS THAT IT IS HARD TO SIT STILL: SEVERAL DAYS

## 2017-06-23 ASSESSMENT — PATIENT HEALTH QUESTIONNAIRE - PHQ9
SUM OF ALL RESPONSES TO PHQ QUESTIONS 1-9: 4
10. IF YOU CHECKED OFF ANY PROBLEMS, HOW DIFFICULT HAVE THESE PROBLEMS MADE IT FOR YOU TO DO YOUR WORK, TAKE CARE OF THINGS AT HOME, OR GET ALONG WITH OTHER PEOPLE: NOT DIFFICULT AT ALL
SUM OF ALL RESPONSES TO PHQ QUESTIONS 1-9: 4

## 2017-06-23 ASSESSMENT — PAIN SCALES - GENERAL: PAINLEVEL: MODERATE PAIN (4)
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
## 2017-06-23 NOTE — NURSING NOTE
"Chief Complaint   Patient presents with     Recheck Medication     Vallium      Panel Management     yearly urine durg screen,        Initial /86 (BP Location: Left arm, Patient Position: Chair, Cuff Size: Adult Regular)  Pulse 112  Temp 98.2  F (36.8  C) (Oral)  Resp 12  Wt 149 lb (67.6 kg)  LMP 06/03/2017 (Exact Date)  SpO2 100%  BMI 27.49 kg/m2 Estimated body mass index is 27.49 kg/(m^2) as calculated from the following:    Height as of 5/8/17: 5' 1.73\" (1.568 m).    Weight as of this encounter: 149 lb (67.6 kg).  Medication Reconciliation: complete  "

## 2017-06-24 ASSESSMENT — ANXIETY QUESTIONNAIRES: GAD7 TOTAL SCORE: 4

## 2017-06-24 ASSESSMENT — PATIENT HEALTH QUESTIONNAIRE - PHQ9: SUM OF ALL RESPONSES TO PHQ QUESTIONS 1-9: 4

## 2017-06-26 ENCOUNTER — MYC MEDICAL ADVICE (OUTPATIENT)
Dept: FAMILY MEDICINE | Facility: OTHER | Age: 27
End: 2017-06-26

## 2017-06-26 ENCOUNTER — TELEPHONE (OUTPATIENT)
Dept: FAMILY MEDICINE | Facility: OTHER | Age: 27
End: 2017-06-26

## 2017-06-26 DIAGNOSIS — M54.42 CHRONIC BILATERAL LOW BACK PAIN WITH LEFT-SIDED SCIATICA: ICD-10-CM

## 2017-06-26 DIAGNOSIS — G89.4 CHRONIC PAIN SYNDROME: ICD-10-CM

## 2017-06-26 DIAGNOSIS — M51.16 LUMBAR DISC DISEASE WITH RADICULOPATHY: ICD-10-CM

## 2017-06-26 DIAGNOSIS — G89.29 CHRONIC BILATERAL LOW BACK PAIN WITH LEFT-SIDED SCIATICA: ICD-10-CM

## 2017-06-26 DIAGNOSIS — Z98.1 S/P LUMBAR FUSION: ICD-10-CM

## 2017-06-26 RX ORDER — HYDROCODONE BITARTRATE AND IBUPROFEN 5; 200 MG/1; MG/1
1-2 TABLET ORAL 3 TIMES DAILY PRN
Qty: 65 TABLET | Refills: 0 | Status: ON HOLD | OUTPATIENT
Start: 2017-06-26 | End: 2017-07-02

## 2017-06-26 NOTE — TELEPHONE ENCOUNTER
Replied Via 3nderhart.     - Patient to stick to Hydrocodone-IBU 5-200 mg, may take 2 at night for a few days but needs to get to 3/day in the 1-2 weeks prior to recheck  - Will also follow up with her surgeon     Zach Rosario PA-C  [facility]     Addendum:     Patient was prescribed the 7.5 mg by myself, this was the day Epic was down, was a hand written prescription. She also never filled the RX given by [facility] on 6/5/17.

## 2017-06-26 NOTE — TELEPHONE ENCOUNTER
Please advise can take to another pharmacy, will need to get paper script from our pharmacy.    Zach Rosario PA-C  [facility]

## 2017-06-26 NOTE — TELEPHONE ENCOUNTER
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 Please also see phone encounter from pharmacy.  Abimbola Castro CMA

## 2017-06-26 NOTE — TELEPHONE ENCOUNTER
Rx signed and given to Tasneem who will give to patient at the     Zach Rosario PA-C  [facility]

## 2017-06-26 NOTE — TELEPHONE ENCOUNTER
Reason for call:  Medication  Reason for Call:  Medication or medication refill:    Do you use a [facility]?  Name of the pharmacy and phone number for the current request:  [facility] [phone]    Name of the medication requested: hydrocodone with ibuprofen    Other request: is going on vacation today and wondering if she could get refill    Can we leave a detailed message on this number? YES    Phone number patient can be reached at: Home number on file [phone] (home)    Best Time: any    Call taken on 6/26/2017 at 9:47 AM by Kenzie Maria

## 2017-06-29 LAB — PAIN DRUG SCR UR W RPTD MEDS: NORMAL

## 2017-06-30 ENCOUNTER — MYC REFILL (OUTPATIENT)
Dept: FAMILY MEDICINE | Facility: OTHER | Age: 27
End: 2017-06-30

## 2017-06-30 DIAGNOSIS — M54.42 CHRONIC BILATERAL LOW BACK PAIN WITH LEFT-SIDED SCIATICA: ICD-10-CM
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
## 2017-06-30 DIAGNOSIS — Z87.19 HX OF CROHN'S DISEASE: ICD-10-CM

## 2017-06-30 DIAGNOSIS — G89.4 CHRONIC PAIN SYNDROME: ICD-10-CM

## 2017-06-30 DIAGNOSIS — G89.29 CHRONIC BILATERAL LOW BACK PAIN WITH LEFT-SIDED SCIATICA: ICD-10-CM

## 2017-06-30 DIAGNOSIS — Z98.1 S/P LUMBAR FUSION: ICD-10-CM

## 2017-06-30 RX ORDER — DIAZEPAM 5 MG
5 TABLET ORAL EVERY 6 HOURS PRN
Qty: 30 TABLET | Refills: 0 | Status: SHIPPED | OUTPATIENT
Start: 2017-06-30 | End: 2017-06-30

## 2017-06-30 RX ORDER — DICYCLOMINE HCL 20 MG
20 TABLET ORAL 4 TIMES DAILY PRN
Qty: 30 TABLET | Refills: 3 | Status: SHIPPED | OUTPATIENT
Start: 2017-06-30 | End: 2017-09-11

## 2017-06-30 NOTE — TELEPHONE ENCOUNTER
Message from TakWakhart:  Original authorizing provider: LOVE Bishop would like a refill of the following medications:  dicyclomine (BENTYL) 20 MG tablet [Aura Rosario PA-C]  diazepam (VALIUM) 5 MG tablet [Aura Rosario PA-C]    Preferred pharmacy: [place], [place] - [address] [place] [address]    Comment:  Hi I can  later today at pharmacy as mom and I had to come home lennox we have water in the basement. Am having a lot muscle spasms from long drive and stomach cramping from Crohn's disease. I made an appt to follow up with spine surgeon on July 24th. Thanks

## 2017-06-30 NOTE — TELEPHONE ENCOUNTER
Shredded Valium prescription.  Looked at CD[facility] notes and shows that patient won't be due for Valium refill until 7/9/17 if taking 1-2 per day as instructed.  DIANE ArciniegaC

## 2017-06-30 NOTE — TELEPHONE ENCOUNTER
Dicyclomine (Bentyl) 20 MG      Last Written Prescription Date: 5/8/17  Last Fill Quantity: 30,  # refills: 3   Last Office Visit with [facility] or  Health prescribing provider: 6/23/17                                         Next 5 appointments (look out 90 days)     Jul 11, 2017 10:30 AM CDT   Office Visit with Aura Rosario PA-C, Shira Faustin   [facility]    [address] [place] [id number]   [phone]                  Diazepam (Valium) 5 MG      Last Written Prescription Date:  6/22/17  Last Fill Quantity: 30,   # refills: 0  Last Office Visit with [facility] or  Health prescribing provider: 6/23/17  Future Office visit:    Next 5 appointments (look out 90 days)     Jul 11, 2017 10:30 AM CDT   Office Visit with Aura Rosario PA-C, Shira Faustin   [facility]    [address] [place] [id number]   [phone]                   Routing refill request to provider for review/approval because:  Drug not on the [facility] or  Health refill protocol or controlled substance

## 2017-07-01 ENCOUNTER — APPOINTMENT (OUTPATIENT)
Dept: GENERAL RADIOLOGY | Facility: CLINIC | Age: 27
End: 2017-07-01
Attending: FAMILY MEDICINE
Payer: MEDICARE

## 2017-07-01 ENCOUNTER — HOSPITAL ENCOUNTER (OUTPATIENT)
Facility: CLINIC | Age: 27
Setting detail: OBSERVATION
Discharge: HOME | End: 2017-07-02
Attending: FAMILY MEDICINE | Admitting: INTERNAL MEDICINE
Payer: MEDICARE

## 2017-07-01 DIAGNOSIS — K92.0 HEMATEMESIS WITH NAUSEA: Primary | ICD-10-CM

## 2017-07-01 DIAGNOSIS — H91.93 BILATERAL HEARING LOSS, UNSPECIFIED HEARING LOSS TYPE: ICD-10-CM

## 2017-07-01 DIAGNOSIS — R11.2 NAUSEA AND VOMITING, INTRACTABILITY OF VOMITING NOT SPECIFIED, UNSPECIFIED VOMITING TYPE: ICD-10-CM

## 2017-07-01 DIAGNOSIS — T39.395A GI BLEED DUE TO NSAIDS: ICD-10-CM

## 2017-07-01 DIAGNOSIS — K50.118 CROHN'S COLITIS, OTHER COMPLICATION (H): ICD-10-CM

## 2017-07-01 DIAGNOSIS — G89.4 CHRONIC PAIN SYNDROME: ICD-10-CM

## 2017-07-01 DIAGNOSIS — T39.315A IBUPROFEN ADVERSE REACTION, INITIAL ENCOUNTER: ICD-10-CM

## 2017-07-01 DIAGNOSIS — F31.9 BIPOLAR AFFECTIVE DISORDER, REMISSION STATUS UNSPECIFIED (H): ICD-10-CM

## 2017-07-01 DIAGNOSIS — R10.13 ABDOMINAL PAIN, EPIGASTRIC: ICD-10-CM

## 2017-07-01 DIAGNOSIS — K92.2 GI BLEED DUE TO NSAIDS: ICD-10-CM

## 2017-07-01 DIAGNOSIS — M54.42 CHRONIC BILATERAL LOW BACK PAIN WITH LEFT-SIDED SCIATICA: ICD-10-CM

## 2017-07-01 DIAGNOSIS — K21.00 GASTROESOPHAGEAL REFLUX DISEASE WITH ESOPHAGITIS: ICD-10-CM

## 2017-07-01 DIAGNOSIS — K92.2 ACUTE GASTROINTESTINAL HEMORRHAGE: ICD-10-CM

## 2017-07-01 DIAGNOSIS — K92.2 UPPER GI BLEED: ICD-10-CM

## 2017-07-01 DIAGNOSIS — G89.29 CHRONIC BILATERAL LOW BACK PAIN WITH LEFT-SIDED SCIATICA: ICD-10-CM

## 2017-07-01 PROBLEM — Z72.0 TOBACCO ABUSE: Status: ACTIVE | Noted: 2017-07-01

## 2017-07-01 PROBLEM — D62 ANEMIA DUE TO BLOOD LOSS, ACUTE: Status: ACTIVE | Noted: 2017-07-01

## 2017-07-01 LAB
ALBUMIN SERPL-MCNC: 3.3 G/DL (ref 3.4–5)
ALBUMIN UR-MCNC: NEGATIVE MG/DL
ALP SERPL-CCNC: 68 U/L (ref 40–150)
ALT SERPL W P-5'-P-CCNC: 25 U/L (ref 0–50)
ANION GAP SERPL CALCULATED.3IONS-SCNC: 5 MMOL/L (ref 3–14)
APPEARANCE UR: ABNORMAL
AST SERPL W P-5'-P-CCNC: 19 U/L (ref 0–45)
BACTERIA #/AREA URNS HPF: ABNORMAL /HPF
BASOPHILS # BLD AUTO: 0 10E9/L (ref 0–0.2)
BASOPHILS NFR BLD AUTO: 0.4 %
BILIRUB SERPL-MCNC: 0.4 MG/DL (ref 0.2–1.3)
BILIRUB UR QL STRIP: NEGATIVE
BUN SERPL-MCNC: 9 MG/DL (ref 7–30)
CALCIUM SERPL-MCNC: 9 MG/DL (ref 8.5–10.1)
CHLORIDE SERPL-SCNC: 104 MMOL/L (ref 94–109)
CO2 SERPL-SCNC: 31 MMOL/L (ref 20–32)
COLOR UR AUTO: ABNORMAL
CREAT SERPL-MCNC: 0.7 MG/DL (ref 0.52–1.04)
CRP SERPL-MCNC: <2.9 MG/L (ref 0–8)
DIFFERENTIAL METHOD BLD: ABNORMAL
EOSINOPHIL # BLD AUTO: 0.2 10E9/L (ref 0–0.7)
EOSINOPHIL NFR BLD AUTO: 2.6 %
ERYTHROCYTE [DISTWIDTH] IN BLOOD BY AUTOMATED COUNT: 11.7 % (ref 10–15)
GFR SERPL CREATININE-BSD FRML MDRD: ABNORMAL ML/MIN/1.7M2
GLUCOSE SERPL-MCNC: 89 MG/DL (ref 70–99)
GLUCOSE UR STRIP-MCNC: NEGATIVE MG/DL
HCT VFR BLD AUTO: 33.9 % (ref 35–47)
HGB BLD-MCNC: 10.3 G/DL (ref 11.7–15.7)
HGB BLD-MCNC: 11.6 G/DL (ref 11.7–15.7)
HGB UR QL STRIP: NEGATIVE
IMM GRANULOCYTES # BLD: 0 10E9/L (ref 0–0.4)
IMM GRANULOCYTES NFR BLD: 0.2 %
KETONES UR STRIP-MCNC: NEGATIVE MG/DL
LEUKOCYTE ESTERASE UR QL STRIP: NEGATIVE
LIPASE SERPL-CCNC: 74 U/L (ref 73–393)
LYMPHOCYTES # BLD AUTO: 1.8 10E9/L (ref 0.8–5.3)
LYMPHOCYTES NFR BLD AUTO: 31.5 %
MCH RBC QN AUTO: 33 PG (ref 26.5–33)
MCHC RBC AUTO-ENTMCNC: 34.2 G/DL (ref 31.5–36.5)
MCV RBC AUTO: 96 FL (ref 78–100)
MONOCYTES # BLD AUTO: 0.8 10E9/L (ref 0–1.3)
MONOCYTES NFR BLD AUTO: 14.1 %
MUCOUS THREADS #/AREA URNS LPF: PRESENT /LPF
NEUTROPHILS # BLD AUTO: 2.9 10E9/L (ref 1.6–8.3)
NEUTROPHILS NFR BLD AUTO: 51.2 %
NITRATE UR QL: NEGATIVE
PH UR STRIP: 5 PH (ref 5–7)
PLATELET # BLD AUTO: 222 10E9/L (ref 150–450)
POTASSIUM SERPL-SCNC: 4 MMOL/L (ref 3.4–5.3)
PROT SERPL-MCNC: 6.3 G/DL (ref 6.8–8.8)
RBC # BLD AUTO: 3.52 10E12/L (ref 3.8–5.2)
RBC #/AREA URNS AUTO: 0 /HPF (ref 0–2)
SODIUM SERPL-SCNC: 140 MMOL/L (ref 133–144)
SP GR UR STRIP: 1.01 (ref 1–1.03)
SQUAMOUS #/AREA URNS AUTO: 2 /HPF (ref 0–1)
URN SPEC COLLECT METH UR: ABNORMAL
UROBILINOGEN UR STRIP-MCNC: 0 MG/DL (ref 0–2)
WBC # BLD AUTO: 5.7 10E9/L (ref 4–11)
WBC #/AREA URNS AUTO: 2 /HPF (ref 0–2)

## 2017-07-01 PROCEDURE — 99285 EMERGENCY DEPT VISIT HI MDM: CPT | Mod: 25 | Performed by: FAMILY MEDICINE

## 2017-07-01 PROCEDURE — 96375 TX/PRO/DX INJ NEW DRUG ADDON: CPT | Performed by: FAMILY MEDICINE

## 2017-07-01 PROCEDURE — 99284 EMERGENCY DEPT VISIT MOD MDM: CPT | Mod: Z6 | Performed by: FAMILY MEDICINE

## 2017-07-01 PROCEDURE — 99219 ZZC INITIAL OBSERVATION CARE,LEVL II: CPT | Performed by: INTERNAL MEDICINE

## 2017-07-01 PROCEDURE — 96365 THER/PROPH/DIAG IV INF INIT: CPT

## 2017-07-01 PROCEDURE — 36415 COLL VENOUS BLD VENIPUNCTURE: CPT | Performed by: PEDIATRICS

## 2017-07-01 PROCEDURE — G0378 HOSPITAL OBSERVATION PER HR: HCPCS

## 2017-07-01 PROCEDURE — 25000132 ZZH RX MED GY IP 250 OP 250 PS 637: Mod: GY | Performed by: PEDIATRICS

## 2017-07-01 PROCEDURE — 85018 HEMOGLOBIN: CPT | Mod: 91 | Performed by: PEDIATRICS

## 2017-07-01 PROCEDURE — 36415 COLL VENOUS BLD VENIPUNCTURE: CPT | Performed by: FAMILY MEDICINE

## 2017-07-01 PROCEDURE — 81001 URINALYSIS AUTO W/SCOPE: CPT | Performed by: PEDIATRICS

## 2017-07-01 PROCEDURE — 36415 COLL VENOUS BLD VENIPUNCTURE: CPT | Performed by: INTERNAL MEDICINE

## 2017-07-01 PROCEDURE — 96374 THER/PROPH/DIAG INJ IV PUSH: CPT | Performed by: FAMILY MEDICINE

## 2017-07-01 PROCEDURE — 83690 ASSAY OF LIPASE: CPT | Performed by: FAMILY MEDICINE

## 2017-07-01 PROCEDURE — 96375 TX/PRO/DX INJ NEW DRUG ADDON: CPT

## 2017-07-01 PROCEDURE — 85018 HEMOGLOBIN: CPT | Performed by: INTERNAL MEDICINE

## 2017-07-01 PROCEDURE — 86140 C-REACTIVE PROTEIN: CPT | Performed by: FAMILY MEDICINE

## 2017-07-01 PROCEDURE — A9270 NON-COVERED ITEM OR SERVICE: HCPCS | Mod: GY | Performed by: FAMILY MEDICINE

## 2017-07-01 PROCEDURE — 25000125 ZZHC RX 250: Performed by: FAMILY MEDICINE

## 2017-07-01 PROCEDURE — 25000125 ZZHC RX 250: Performed by: INTERNAL MEDICINE

## 2017-07-01 PROCEDURE — 96376 TX/PRO/DX INJ SAME DRUG ADON: CPT

## 2017-07-01 PROCEDURE — 96376 TX/PRO/DX INJ SAME DRUG ADON: CPT | Performed by: FAMILY MEDICINE

## 2017-07-01 PROCEDURE — 74020 XR ABDOMEN 2 VW: CPT | Mod: TC

## 2017-07-01 PROCEDURE — 25000128 H RX IP 250 OP 636: Performed by: INTERNAL MEDICINE

## 2017-07-01 PROCEDURE — 25000132 ZZH RX MED GY IP 250 OP 250 PS 637: Mod: GY | Performed by: FAMILY MEDICINE

## 2017-07-01 PROCEDURE — A9270 NON-COVERED ITEM OR SERVICE: HCPCS | Mod: GY | Performed by: PEDIATRICS

## 2017-07-01 PROCEDURE — 80053 COMPREHEN METABOLIC PANEL: CPT | Performed by: FAMILY MEDICINE

## 2017-07-01 PROCEDURE — 96366 THER/PROPH/DIAG IV INF ADDON: CPT

## 2017-07-01 PROCEDURE — 96361 HYDRATE IV INFUSION ADD-ON: CPT | Performed by: FAMILY MEDICINE

## 2017-07-01 PROCEDURE — 85018 HEMOGLOBIN: CPT | Performed by: FAMILY MEDICINE

## 2017-07-01 PROCEDURE — S0164 INJECTION PANTROPRAZOLE: HCPCS | Performed by: INTERNAL MEDICINE

## 2017-07-01 PROCEDURE — 25000128 H RX IP 250 OP 636: Performed by: FAMILY MEDICINE

## 2017-07-01 PROCEDURE — 87086 URINE CULTURE/COLONY COUNT: CPT | Performed by: PEDIATRICS

## 2017-07-01 PROCEDURE — 25000125 ZZHC RX 250: Performed by: PEDIATRICS

## 2017-07-01 PROCEDURE — 85025 COMPLETE CBC W/AUTO DIFF WBC: CPT | Performed by: FAMILY MEDICINE

## 2017-07-01 RX ORDER — ONDANSETRON 2 MG/ML
4 INJECTION INTRAMUSCULAR; INTRAVENOUS EVERY 6 HOURS PRN
Status: DISCONTINUED | OUTPATIENT
Start: 2017-07-01 | End: 2017-07-02 | Stop reason: HOSPADM

## 2017-07-01 RX ORDER — DIPHENHYDRAMINE HYDROCHLORIDE 50 MG/ML
25 INJECTION INTRAMUSCULAR; INTRAVENOUS ONCE
Status: COMPLETED | OUTPATIENT
Start: 2017-07-01 | End: 2017-07-01

## 2017-07-01 RX ORDER — PROMETHAZINE HYDROCHLORIDE 25 MG/ML
25 INJECTION, SOLUTION INTRAMUSCULAR; INTRAVENOUS EVERY 6 HOURS PRN
Status: DISCONTINUED | OUTPATIENT
Start: 2017-07-01 | End: 2017-07-02 | Stop reason: HOSPADM

## 2017-07-01 RX ORDER — CETIRIZINE HYDROCHLORIDE 10 MG/1
10 TABLET ORAL DAILY
COMMUNITY
End: 2018-04-03

## 2017-07-01 RX ORDER — PROCHLORPERAZINE MALEATE 5 MG
5-10 TABLET ORAL EVERY 6 HOURS PRN
Status: CANCELLED | OUTPATIENT
Start: 2017-07-01

## 2017-07-01 RX ORDER — DIPHENHYDRAMINE HCL 25 MG
25 CAPSULE ORAL EVERY 6 HOURS PRN
Status: DISCONTINUED | OUTPATIENT
Start: 2017-07-01 | End: 2017-07-02 | Stop reason: HOSPADM

## 2017-07-01 RX ORDER — PROMETHAZINE HYDROCHLORIDE 25 MG/ML
25 INJECTION, SOLUTION INTRAMUSCULAR; INTRAVENOUS ONCE
Status: COMPLETED | OUTPATIENT
Start: 2017-07-01 | End: 2017-07-01

## 2017-07-01 RX ORDER — HYDROMORPHONE HYDROCHLORIDE 1 MG/ML
0.5 INJECTION, SOLUTION INTRAMUSCULAR; INTRAVENOUS; SUBCUTANEOUS
Status: COMPLETED | OUTPATIENT
Start: 2017-07-01 | End: 2017-07-01

## 2017-07-01 RX ORDER — SODIUM CHLORIDE 9 MG/ML
INJECTION, SOLUTION INTRAVENOUS CONTINUOUS
Status: DISCONTINUED | OUTPATIENT
Start: 2017-07-01 | End: 2017-07-02 | Stop reason: HOSPADM

## 2017-07-01 RX ORDER — LIDOCAINE 50 MG/G
1 PATCH TOPICAL
Status: DISCONTINUED | OUTPATIENT
Start: 2017-07-01 | End: 2017-07-02 | Stop reason: HOSPADM

## 2017-07-01 RX ORDER — HYDROMORPHONE HYDROCHLORIDE 2 MG/1
2 TABLET ORAL EVERY 4 HOURS PRN
Status: DISCONTINUED | OUTPATIENT
Start: 2017-07-01 | End: 2017-07-02 | Stop reason: HOSPADM

## 2017-07-01 RX ORDER — SUCRALFATE ORAL 1 G/10ML
1 SUSPENSION ORAL
Status: DISCONTINUED | OUTPATIENT
Start: 2017-07-01 | End: 2017-07-02 | Stop reason: HOSPADM

## 2017-07-01 RX ORDER — NALOXONE HYDROCHLORIDE 0.4 MG/ML
.1-.4 INJECTION, SOLUTION INTRAMUSCULAR; INTRAVENOUS; SUBCUTANEOUS
Status: DISCONTINUED | OUTPATIENT
Start: 2017-07-01 | End: 2017-07-02 | Stop reason: HOSPADM

## 2017-07-01 RX ORDER — PROCHLORPERAZINE 25 MG
25 SUPPOSITORY, RECTAL RECTAL EVERY 12 HOURS PRN
Status: CANCELLED | OUTPATIENT
Start: 2017-07-01

## 2017-07-01 RX ORDER — HYDROMORPHONE HYDROCHLORIDE 1 MG/ML
.3-.5 INJECTION, SOLUTION INTRAMUSCULAR; INTRAVENOUS; SUBCUTANEOUS
Status: DISCONTINUED | OUTPATIENT
Start: 2017-07-01 | End: 2017-07-02 | Stop reason: HOSPADM

## 2017-07-01 RX ORDER — PROMETHAZINE HYDROCHLORIDE 25 MG/ML
25 INJECTION, SOLUTION INTRAMUSCULAR; INTRAVENOUS EVERY 6 HOURS PRN
Status: DISCONTINUED | OUTPATIENT
Start: 2017-07-01 | End: 2017-07-01

## 2017-07-01 RX ORDER — PANTOPRAZOLE SODIUM 40 MG/1
40 TABLET, DELAYED RELEASE ORAL
Status: DISCONTINUED | OUTPATIENT
Start: 2017-07-02 | End: 2017-07-02 | Stop reason: HOSPADM

## 2017-07-01 RX ORDER — LIDOCAINE 40 MG/G
CREAM TOPICAL
Status: DISCONTINUED | OUTPATIENT
Start: 2017-07-01 | End: 2017-07-01

## 2017-07-01 RX ORDER — SODIUM CHLORIDE 9 MG/ML
1000 INJECTION, SOLUTION INTRAVENOUS CONTINUOUS
Status: DISCONTINUED | OUTPATIENT
Start: 2017-07-01 | End: 2017-07-01

## 2017-07-01 RX ORDER — HYDROCODONE BITARTRATE AND ACETAMINOPHEN 10; 325 MG/1; MG/1
1 TABLET ORAL 3 TIMES DAILY
Status: DISCONTINUED | OUTPATIENT
Start: 2017-07-01 | End: 2017-07-01

## 2017-07-01 RX ORDER — DIPHENHYDRAMINE HYDROCHLORIDE 50 MG/ML
25 INJECTION INTRAMUSCULAR; INTRAVENOUS EVERY 6 HOURS PRN
Status: DISCONTINUED | OUTPATIENT
Start: 2017-07-01 | End: 2017-07-02 | Stop reason: HOSPADM

## 2017-07-01 RX ORDER — ONDANSETRON 4 MG/1
4 TABLET, ORALLY DISINTEGRATING ORAL EVERY 6 HOURS PRN
Status: DISCONTINUED | OUTPATIENT
Start: 2017-07-01 | End: 2017-07-02 | Stop reason: HOSPADM

## 2017-07-01 RX ADMIN — SODIUM CHLORIDE: 9 INJECTION, SOLUTION INTRAVENOUS at 09:24

## 2017-07-01 RX ADMIN — PROMETHAZINE HYDROCHLORIDE 25 MG: 25 INJECTION INTRAMUSCULAR; INTRAVENOUS at 20:17

## 2017-07-01 RX ADMIN — SODIUM CHLORIDE 1000 ML: 9 INJECTION, SOLUTION INTRAVENOUS at 01:22

## 2017-07-01 RX ADMIN — LIDOCAINE HYDROCHLORIDE 30 ML: 20 SOLUTION ORAL; TOPICAL at 17:17

## 2017-07-01 RX ADMIN — PROMETHAZINE HYDROCHLORIDE 25 MG: 25 INJECTION INTRAMUSCULAR; INTRAVENOUS at 11:51

## 2017-07-01 RX ADMIN — SODIUM CHLORIDE 8 MG/HR: 9 INJECTION, SOLUTION INTRAVENOUS at 09:24

## 2017-07-01 RX ADMIN — HYDROMORPHONE HYDROCHLORIDE 0.5 MG: 1 INJECTION, SOLUTION INTRAMUSCULAR; INTRAVENOUS; SUBCUTANEOUS at 14:57

## 2017-07-01 RX ADMIN — HYDROMORPHONE HYDROCHLORIDE 0.5 MG: 1 INJECTION, SOLUTION INTRAMUSCULAR; INTRAVENOUS; SUBCUTANEOUS at 11:51

## 2017-07-01 RX ADMIN — HYDROMORPHONE HYDROCHLORIDE 0.5 MG: 1 INJECTION, SOLUTION INTRAMUSCULAR; INTRAVENOUS; SUBCUTANEOUS at 06:21

## 2017-07-01 RX ADMIN — SUCRALFATE 1 G: 1 SUSPENSION ORAL at 17:29

## 2017-07-01 RX ADMIN — HYDROMORPHONE HYDROCHLORIDE 0.5 MG: 1 INJECTION, SOLUTION INTRAMUSCULAR; INTRAVENOUS; SUBCUTANEOUS at 22:18

## 2017-07-01 RX ADMIN — SODIUM CHLORIDE: 9 INJECTION, SOLUTION INTRAVENOUS at 12:48

## 2017-07-01 RX ADMIN — DIPHENHYDRAMINE HYDROCHLORIDE 25 MG: 25 CAPSULE ORAL at 18:45

## 2017-07-01 RX ADMIN — SUCRALFATE 1 G: 1 SUSPENSION ORAL at 22:18

## 2017-07-01 RX ADMIN — PROMETHAZINE HYDROCHLORIDE 25 MG: 25 INJECTION INTRAMUSCULAR; INTRAVENOUS at 05:26

## 2017-07-01 RX ADMIN — HYDROMORPHONE HYDROCHLORIDE 0.5 MG: 1 INJECTION, SOLUTION INTRAMUSCULAR; INTRAVENOUS; SUBCUTANEOUS at 01:39

## 2017-07-01 RX ADMIN — PANTOPRAZOLE SODIUM 40 MG: 40 INJECTION, POWDER, FOR SOLUTION INTRAVENOUS at 01:28

## 2017-07-01 RX ADMIN — HYDROMORPHONE HYDROCHLORIDE 0.5 MG: 1 INJECTION, SOLUTION INTRAMUSCULAR; INTRAVENOUS; SUBCUTANEOUS at 09:24

## 2017-07-01 RX ADMIN — SODIUM CHLORIDE 1000 ML: 9 INJECTION, SOLUTION INTRAVENOUS at 02:43

## 2017-07-01 RX ADMIN — LIDOCAINE HYDROCHLORIDE 30 ML: 20 SOLUTION ORAL; TOPICAL at 02:43

## 2017-07-01 RX ADMIN — HYDROMORPHONE HYDROCHLORIDE 2 MG: 2 TABLET ORAL at 17:29

## 2017-07-01 RX ADMIN — DIPHENHYDRAMINE HYDROCHLORIDE 25 MG: 50 INJECTION, SOLUTION INTRAMUSCULAR; INTRAVENOUS at 01:24

## 2017-07-01 RX ADMIN — ONDANSETRON HYDROCHLORIDE 4 MG: 2 INJECTION, SOLUTION INTRAMUSCULAR; INTRAVENOUS at 17:21

## 2017-07-01 RX ADMIN — DIPHENHYDRAMINE HYDROCHLORIDE 25 MG: 50 INJECTION, SOLUTION INTRAMUSCULAR; INTRAVENOUS at 23:36

## 2017-07-01 RX ADMIN — HYDROMORPHONE HYDROCHLORIDE 0.5 MG: 1 INJECTION, SOLUTION INTRAMUSCULAR; INTRAVENOUS; SUBCUTANEOUS at 03:01

## 2017-07-01 RX ADMIN — HYDROMORPHONE HYDROCHLORIDE 0.3 MG: 1 INJECTION, SOLUTION INTRAMUSCULAR; INTRAVENOUS; SUBCUTANEOUS at 19:00

## 2017-07-01 RX ADMIN — PROMETHAZINE HYDROCHLORIDE 25 MG: 25 INJECTION INTRAMUSCULAR; INTRAVENOUS at 01:34

## 2017-07-01 RX ADMIN — SODIUM CHLORIDE: 9 INJECTION, SOLUTION INTRAVENOUS at 22:18

## 2017-07-01 ASSESSMENT — ENCOUNTER SYMPTOMS
FATIGUE: 0
APPETITE CHANGE: 1
NERVOUS/ANXIOUS: 1
FEVER: 0
EYES NEGATIVE: 1
NAUSEA: 1
ABDOMINAL PAIN: 1
ENDOCRINE NEGATIVE: 1
COUGH: 0
DYSURIA: 1
NEUROLOGICAL NEGATIVE: 1
CHILLS: 0
BACK PAIN: 1
LIGHT-HEADEDNESS: 0
ACTIVITY CHANGE: 1
VOMITING: 1

## 2017-07-01 ASSESSMENT — PAIN DESCRIPTION - DESCRIPTORS
DESCRIPTORS: CONSTANT

## 2017-07-01 NOTE — H&P
[facility]    History and Physical  Hospitalist       Date of Admission:  7/1/2017  Date of Service (when I saw the patient): 07/01/17    Assessment & Plan       Active Problems:    Upper GI bleed - suspected    Assessment: presents with epigastric pain with associated nausea and vomiting.  She vomited 8 times since supper last night but has not vomited in the 4 hours since being in the ED. She noticed some blood in a few of the episodes of vomiting but did not have any vomiting of pure blood.  She denies any melena or bright red blood currently but says she did notice some red blood rectally a couple of days ago.  She has been on both naproxen and ibuprofen for her chronic pain.  With the location of her pain and the fact she has had vomiting with blood in it she likely has an upper GIB related to her NSAID use.  She does have a history of Crohn's but is not having any diarrhea.  Her hgb has dropped about 4 gm since April.  She has been monitored for several hours in the ED and her hgb has only dropped an additional 1 gm even after IV fluids and she has not had any recurrent vomiting.  She is not tachycardic or hypotensive so her bleeding appears to be relatively slow at this point and therefore appears safe to monitor here for now.    Plan: register to observation, NPO, IV fluids, IV protonix drip, serial hgb checks and IV dilaudid as needed for pain.   Can do EGD after discharge if the bleeding stops but if her hgb continues to drop would then have to transfer for more urgent EGD.         Anemia due to blood loss, acute    Assessment: as above    Plan: as above      Hearing loss    Assessment: chronic and here with  today    Plan: no intervention      Crohn's colitis (H)    Assessment: no symptoms of diarrhea    Plan: no acute interventions      Bipolar disorder (H)    Assessment: appears controlled    Plan: hold meds for now      Gastroesophageal reflux  disease without esophagitis    Assessment: previously noted gastritis and duodenitis on EGD in 2005    Plan: IV protonix for now      Mild persistent asthma without complication    Assessment: no symptoms and not using any inhalers    Plan: no intervention      Chronic bilateral low back pain with left-sided sciatica    Assessment: IV dilaudid for now    Plan: as above       Tobacco abuse    Assessment: chronic    Plan: no acute intervention      History of pseudoseizure    Assessment: none since being here    Plan: no intervention    DVT Prophylaxis: anticipate less than 24 hour stay  Code Status: Full Code    Disposition: Expected discharge in 1 days once hgb stable and tolerating clear liquid diet.    Stone Long MD    Primary Care Physician   Aura Rosario    Chief Complaint   Vomiting    History is obtained from the patient    History of Present Illness   Katy Islas is a 27 year old female who presents with vomiting and abdominal pain.  Last night after supper she started having epigastric pain and she then started to vomit.  She vomited a total of 8 times and a few of those episodes were noted to have blood.  She did not have any vomiting that was pure blood and she did not notice black stools or bloody stools.  She did notice a little bit of blood in her stool a couple of days ago but just one episode and a small amount. Her pain has been epigastric and has now improved with IV dilaudid given in the ED along with a GI cocktail.  She is now being registered to observation.    Past Medical History    I have reviewed this patient's medical history and updated it with pertinent information if needed.   Past Medical History:   Diagnosis Date     Abdominal pain 10/31- 11/4/2005    [facility] admit for Crohn's     Allergic rhinitis, cause unspecified     Allergic rhinitis     Arthritis      C. difficile diarrhea     Past, no current diarrhea.     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, without mention of intractable epilepsy     pseudoseizures diagnosed after extensive neurologic eval     Migraine 07/21/12    D/C 07/22/12-[facility]     Migraine, unspecified, without mention of intractable migraine without mention of status migrainosus     Migraine     Mild intermittent asthma     mild intermittent     Mycoplasma infection in conditions classified elsewhere and of unspecified site      Other chronic pain     Back pain for 6 years     Renal disease     Kidney stones     Unspecified hearing loss     congenital hearing loss       Past Surgical History   I have reviewed this patient's surgical history and updated it with pertinent information if needed.  Past Surgical History:   Procedure Laterality Date     COLONOSCOPY  7/1/2009    [facility].     FUSION SPINE POSTERIOR MINIMALLY INVASIVE ONE LEVEL N/A 2/23/2017    Procedure: FUSION SPINE POSTERIOR MINIMALLY INVASIVE ONE LEVEL;  L4-5 Oblique Lateral Lumbar Interbody Fusion   Epidural steroid injection.   Transpedicular Bone marrow aspiration;  Surgeon: Jeniffer Eugene MD;  Location: [facility] OR     HC COLONOSCOPY THRU STOMA WITH BIOPSY  10/29/2003    Impression is that of normal appearing colonoscopy, without evidence of rectal bleeding.     HC COLONOSCOPY THRU STOMA, DIAGNOSTIC  10/00    normal     HC COLONOSCOPY THRU STOMA, DIAGNOSTIC  Oct 2009    Dr López- normal     HC EEG AWAKE AND SLEEP      abnormal     HC MRI BRAIN W/O CONTRAST  12/00    normal     HC REMOVAL GALLBLADDER  8/5/2009    [facility], [place]     HC  UGI ENDOSCOPY DIAG W BIOPSY  11/11/09    Normal esophagus     HC UGI ENDOSCOPY, SIMPLE EXAM  7/00, 10/00    mild chronic esophagitis and duodenitis, neg H pylori     HC UGI ENDOSCOPY, SIMPLE EXAM  01/20/2005    Esophagogastroduodenoscopy, colonoscopy with biopsies.  [facility]     HC UGI ENDOSCOPY, SIMPLE EXAM  7/1/2009    [facility] [place]     HC UGI ENDOSCOPY, SIMPLE EXAM  11/11/2009    attempted upper GI, pt. could not tolerate procedure:[facility]     ORTHOPEDIC SURGERY  October 19,2011    diskectomy L4-L5       Prior to Admission Medications   Prior to Admission Medications   Prescriptions Last Dose Informant Patient Reported? Taking?   ARIPiprazole (ABILIFY) 2 MG tablet 6/30/2017 at 1930  No Yes   Sig: Take 1 tablet (2 mg) by mouth At Bedtime   DULoxetine (CYMBALTA) 60 MG EC capsule 6/30/2017 at 1930  No Yes   Sig: Take 1 capsule (60 mg) by mouth daily   EPINEPHrine (EPIPEN) 0.3 MG/0.3ML injection   No Yes   Sig: Inject 0.3 mLs (0.3 mg) into the muscle once as needed for anaphylaxis   Hydrocodone-Ibuprofen 5-200 MG TABS 6/30/2017 at 1300  No Yes   Sig: Take 1-2 tablets by mouth 3 times daily as needed (Max 5 per day)   Misc Natural Products (FLEX-A-MIN JOINT FLEX PO) 6/30/2017 at 0800  Yes Yes   Sig: Take by mouth daily Reported on 3/28/2017   Multiple Vitamin (MULTI-VITAMIN) per tablet 6/30/2017 at 0800  Yes Yes   Sig: Take 1 tablet by mouth daily Reported on 3/28/2017   cetirizine (ZYRTEC) 10 MG tablet 6/30/2017 at 0800  Yes Yes   Sig: Take 10 mg by mouth daily   cyclobenzaprine (FLEXERIL) 10 MG tablet 6/30/2017 at 1930  No Yes   Sig: Take 1 tablet (10 mg) by mouth 2 times daily as needed for muscle spasms or other (back pain)   dexlansoprazole (DEXILANT) 60 MG CPDR CR capsule 6/30/2017 at 0800  No Yes   Sig: Take 1 capsule (60 mg) by mouth daily   diazepam (VALIUM) 5 MG tablet Past Week at Unknown time  No Yes   Sig: Take 0.5-1 tablets (2.5-5 mg) by mouth every 6  hours as needed for anxiety or sleep (MUSCLE SPASM)   dicyclomine (BENTYL) 20 MG tablet 6/30/2017 at 2330  No Yes   Sig: Take 1 tablet (20 mg) by mouth 4 times daily as needed (ABDOMINAL CRAMPING) AS NEEDED FOR CRAMPING   fluticasone (FLONASE) 50 MCG/ACT spray 6/30/2017 at 0800  No Yes   Sig: Spray 2 sprays into both nostrils daily   gabapentin (NEURONTIN) 300 MG capsule 6/30/2017 at 1930  No Yes   Sig: Take 1 capsule (300 mg) by mouth 3 times daily   lidocaine (LIDODERM) 5 % Patch Past Week at Unknown time  No Yes   Sig: Apply up to 3 patches to painful area at once for up to 12 h within a 24 h period.  Remove after 12 hours.   meclizine (ANTIVERT) 12.5 MG tablet Past Month at Unknown time  No Yes   Sig: Take 1 tablet (12.5 mg) by mouth 4 times daily as needed for dizziness   medical cannabis inhalation (Patient's own supply.  Not a prescription) Past Week at Unknown time  Yes Yes   Sig: Inhale into the lungs 3 times daily as needed Reported on 3/28/2017   naproxen (NAPROSYN) 500 MG tablet Past Week at Unknown time  No Yes   Sig: Take 1 tablet (500 mg) by mouth 2 times daily (with meals)   ondansetron (ZOFRAN ODT) 4 MG ODT tab 6/30/2017 at 2300  No Yes   Sig: Take 1-2 tablets (4-8 mg) by mouth every 6 hours as needed for nausea Take 1-2 tablets by mouth as needed for nausea   pantoprazole (PROTONIX) 40 MG EC tablet 6/30/2017 at Unknown time  No Yes   Sig: Take 1 tablet (40 mg) by mouth daily Take 30-60 minutes before a meal.   promethazine (PHENERGAN) 25 MG tablet Past Week at Unknown time  No Yes   Sig: Take 1 tablet (25 mg) by mouth every 6 hours as needed for nausea   rizatriptan (MAXALT) 5 MG tablet Past Month at Unknown time  No Yes   Sig: Take 1-2 tablets (5-10 mg) by mouth at onset of headache for migraine May repeat in 2 hours. Max 6 tablets/24 hours.   rizatriptan (MAXALT-MLT) 5 MG ODT tab Past Month at Unknown time  No Yes   Sig: Take 1-2 tablets (5-10 mg) by mouth at onset of headache for migraine May  repeat in 2 hours. Max 6 tablets/24 hours.   senna-docusate (SENOKOT-S;PERICOLACE) 8.6-50 MG per tablet Past Month at Unknown time  No Yes   Sig: Take 1 tablet by mouth 2 times daily as needed for constipation      Facility-Administered Medications: None     Allergies   Allergies   Allergen Reactions     Ativan [Lorazepam] Hives     At the IV site     Baclofen      hives     Bees Hives, Swelling and Difficulty breathing     Caffeine      Contrast Dye      Hives,   Updated 5/10/2016 CT Contrast.     Diazepam      IV form makes her become aggressive and angry     Iodine Hives     Methocarbamol Swelling     Metoclopramide      Other reaction(s): Tremors  LW Reaction: shaking/sweating     Midazolam      Other reaction(s): Agitation     Monosodium Glutamate      Morphine Other (See Comments)     Difficulty with urination     Other (Do Not Use) Other (See Comments)     Xanaflex- pt becomes disoriented and loses bladder control     Percocet [Oxycodone-Acetaminophen] Itching     Reglan [Metoclopramide Hcl] Other (See Comments)     shaking     Soma [Carisoprodol] Visual Disturbance     Sleep walking     Tizanidine      Topamax Other (See Comments)     Topamax [Topiramate] Nausea     Tingling  GI/Vomit     Tramadol      Severe Headache, Seizure Risk     Tylenol W/Codeine [Acetaminophen-Codeine] Nausea and Itching     Tylenol 3     Valium Other (See Comments)     Becomes aggressive and angry     Versed Other (See Comments)     Zolmitriptan      Makes face feel like its twitching     Droperidol Anxiety     Flu Virus Vaccine Rash      Arm swelling       Social History   I have reviewed this patient's social history and updated it with pertinent information if needed. Katy Islas  reports that she has been smoking Cigarettes.  She has been smoking about 0.25 packs per day. She has never used smokeless tobacco. She reports that she drinks about 0.6 oz of alcohol per week  She reports that she does not use illicit  drugs.    Family History   I have reviewed this patient's family history and updated it with pertinent information if needed.   Family History   Problem Relation Age of Onset     GASTROINTESTINAL DISEASE Brother      severe Crohn's     Neurologic Disorder Brother      Seizures post head injury     Depression Brother      Substance Abuse Brother      Genitourinary Problems Father      kidney stones     DIABETES Father      HEART DISEASE Father      Open heart surgery     Breast Cancer Maternal Grandmother      Parkinsonism Maternal Grandmother      CEREBROVASCULAR DISEASE Paternal Grandmother      CANCER Maternal Grandfather      Lung     Cardiovascular Paternal Grandfather      Heart Attack     Substance Abuse Mother        Review of Systems   The 10 point Review of Systems is negative other than noted in the HPI or here.     Physical Exam   Temp: 98.3  F (36.8  C) Temp src: Temporal BP: 127/88   Heart Rate: 84 Resp: 20 SpO2: 99 % O2 Device: None (Room air)    Vital Signs with Ranges  Temp:  [98.3  F (36.8  C)] 98.3  F (36.8  C)  Heart Rate:  [84] 84  Resp:  [20] 20  BP: (127)/(88) 127/88  SpO2:  [98 %-99 %] 99 %  148 lbs 0 oz    Constitutional:   awake, alert, cooperative, no apparent distress, and appears stated age     Eyes:   Lids and lashes normal, pupils equal, round and reactive to light, extra ocular muscles intact, sclera clear, conjunctiva normal     ENT:   Normocephalic, without obvious abnormality, atraumatic, sinuses nontender on palpation, external ears without lesions, oral pharynx with moist mucous membranes, tonsils without erythema or exudates, gums normal and good dentition.     Neck:   Supple, symmetrical, trachea midline, no adenopathy, thyroid symmetric, not enlarged and no tenderness, skin normal     Hematologic / Lymphatic:   no cervical lymphadenopathy and no supraclavicular lymphadenopathy     Back:   Symmetric, no curvature, spinous processes are non-tender on palpation, paraspinous  muscles are non-tender on palpation, no costal vertebral tenderness     Lungs:   No increased work of breathing, good air exchange, clear to auscultation bilaterally, no crackles or wheezing     Cardiovascular:   Normal apical impulse, regular rate and rhythm, normal S1 and S2, no S3 or S4, and no murmur noted     Abdomen:   +BS.  Soft, non distended, mild epigastric tenderness without rebound or guarding     Neurologic:   Awake, alert, oriented to name, place and time.  Cranial nerves II-XII are grossly intact except for [place].  Motor is 5 out of 5 bilaterally.    Sensory is intact.         Data   Data reviewed today:  I personally reviewed no images or EKG's today.    Recent Labs  Lab 07/01/17  0512 07/01/17  0120   WBC  --  5.7   HGB 10.3* 11.6*   MCV  --  96   PLT  --  222   NA  --  140   POTASSIUM  --  4.0   CHLORIDE  --  104   CO2  --  31   BUN  --  9   CR  --  0.70   ANIONGAP  --  5   SARANYA  --  9.0   GLC  --  89   ALBUMIN  --  3.3*   PROTTOTAL  --  6.3*   BILITOTAL  --  0.4   ALKPHOS  --  68   ALT  --  25   AST  --  19   LIPASE  --  74       Recent Results (from the past 24 hour(s))   XR Abdomen 2 Views    Narrative    ABDOMEN 2 VIEWS   7/1/2017 3:15 AM     HISTORY: GI bleeding.    COMPARISON: None.      Impression    IMPRESSION: Moderate amount of stool in the colon. Bowel gas pattern  is otherwise within normal limits. No convincing radiographic evidence  for small bowel obstruction. No free intraperitoneal air. Surgical  clips RUQ. Posterior marilyn and pedicle screw fusion at L4-5.    MARCELINA HALL MD

## 2017-07-01 NOTE — ED NOTES
ED Nursing criteria listed below was addressed during verbal handoff:     Abnormal vitals: Yes  Abnormal results: Yes  Med Reconciliation completed: Yes  Meds given in ED: Yes  Any Overdue Meds: Yes  Core Measures: N/A  Isolation: N/A  Special needs: Yes  Skin assessment: N/A    Observation Patient  Education provided: Yes
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
## 2017-07-01 NOTE — IP AVS SNAPSHOT
MRN:[id number]                      After Visit Summary   7/1/2017    Katy Islas    MRN: [id number]           Thank you!     Thank you for choosing [facility] for your care. Our goal is always to provide you with excellent care. Hearing back from our patients is one way we can continue to improve our services. Please take a few minutes to complete the written survey that you may receive in the mail after you visit with us. Thank you!        Patient Information     Date Of Birth          1990        Designated Caregiver       Most Recent Value    Caregiver    Will someone help with your care after discharge? yes    Name of designated caregiver gian    Phone number of caregiver mom      About your hospital stay     You were admitted on:  July 1, 2017 You last received care in the:  [address]    You were discharged on:  July 2, 2017        Reason for your hospital stay       Hospitalized with abdominal pain and possible upper GI bleeding and improved                  Who to Call     For medical emergencies, please call 911.  For non-urgent questions about your medical care, please call your primary care provider or clinic, [phone]          Attending Provider     Provider Specialty    Victorino Rojas,  [facility]    Will Jurado MD Internal Medicine       Primary Care Provider Office Phone # Fax #    Aura Rosario PA-C [phone]      After Care Instructions     Activity       Your activity upon discharge: activity as tolerated and no driving while on narcotic analgesics (hydromorphone)            Diet       Follow this diet upon discharge: Advance to a bland diet as tolerated                  Follow-up Appointments     Follow-up and recommended labs and tests        Follow up with primary care provider, Aura Rosario, within 7 days to evaluate medication change and for hospital follow- up.                   Your next 10 appointments already scheduled     Jul 06, 2017 10:45 AM CDT   SHORT with Aura Rosario PA-C   [facility] ([facility]    [address] [address]   [phone]            Jul 11, 2017 10:30 AM CDT   Office Visit with Aura Rosario PA-C   [facility] ([facility])    [address] [address]   [phone]           Bring a current list of meds and any records pertaining to this visit.  For Physicals, please bring immunization records and any forms needing to be filled out.  Please arrive 10 minutes early to complete paperwork.            Jul 13, 2017  9:45 AM CDT   PROCEDURE with Aura Rosario PA-C, NL PROC ROOM 1ST FLOOR [facility] ([facility])    [address] [place] [address]   [phone]              Additional Services     GASTROENTEROLOGY ADULT REF CONSULT ONLY       Preferred Location: [facility] [phone]      Please be aware that coverage of these services is subject to the terms and limitations of your health insurance plan.  Call member services at your health plan with any benefit or coverage questions.  Any procedures must be performed at a [facility] facility OR coordinated by your clinic's referral office.    Please bring the following with you to your appointment:    (1) Any X-Rays, CTs or MRIs which have been performed.  Contact the facility where they were done to arrange for  prior to your scheduled appointment.    (2) List of current medications   (3) This referral request   (4) Any documents/labs given to you for this referral                  Further instructions from your care team       PLEASE CALL [facility] # [phone] TO SET UP YOUR APPOINTMENT.    Pending Results     Date and Time Order Name Status Description    7/1/2017 1050 Urine Culture  "Aerobic Bacterial Preliminary             Statement of Approval     Ordered          07/02/17 1230  I have reviewed and agree with all the recommendations and orders detailed in this document.  EFFECTIVE NOW     Approved and electronically signed by:  Will Jurado MD             Admission Information     Date & Time Provider Department Dept. Phone    7/1/2017 Will Jurado MD [address] Surgical [phone]      Your Vitals Were     Blood Pressure Pulse Temperature Respirations Height Weight    115/71 80 98.4  F (36.9  C) (Oral) 16 1.575 m (5' 2\") 70.5 kg (155 lb 6.8 oz)    Last Period Pulse Oximetry BMI (Body Mass Index)             06/03/2017 (Exact Date) 97% 28.43 kg/m2         MyChart Information     [employer] gives you secure access to your electronic health record. If you see a primary care provider, you can also send messages to your care team and make appointments. If you have questions, please call your primary care clinic.  If you do not have a primary care provider, please call [phone] and they will assist you.        Care EveryWhere ID     This is your Care EveryWhere ID. This could be used by other organizations to access your [facility] medical records  [id number]        Equal Access to Services     VIKY VILLANUEVA AH: Ruth Chen, mayank kelly, qaashley ragland, susan turner. So [facility] [phone].    ATENCIÓN: Si habla español, tiene a kerr disposición servicios gratuitos de asistencia lingüística. Casper al [phone].    We comply with applicable federal civil rights laws and [place] laws. We do not discriminate on the basis of race, color, national origin, age, disability sex, sexual orientation or gender identity.               Review of your medicines      START taking        Dose / Directions    HYDROmorphone 2 MG tablet   Commonly known as:  DILAUDID   Used for:  Abdominal pain, epigastric        Dose:  2 mg "   Take 1 tablet (2 mg) by mouth every 4 hours as needed for severe pain   Quantity:  20 tablet   Refills:  0       sucralfate 1 GM/10ML suspension   Commonly known as:  CARAFATE   Used for:  Abdominal pain, epigastric, Hematemesis with nausea        Dose:  1 g   Take 10 mLs (1 g) by mouth 4 times daily (before meals and nightly)   Quantity:  420 mL   Refills:  0         CONTINUE these medicines which may have CHANGED, or have new prescriptions. If we are uncertain of the size of tablets/capsules you have at home, strength may be listed as something that might have changed.        Dose / Directions    rizatriptan 5 MG tablet   Commonly known as:  MAXALT   This may have changed:  Another medication with the same name was removed. Continue taking this medication, and follow the directions you see here.   Used for:  Migraine with aura and without status migrainosus, not intractable        Dose:  5-10 mg   Take 1-2 tablets (5-10 mg) by mouth at onset of headache for migraine May repeat in 2 hours. Max 6 tablets/24 hours.   Quantity:  18 tablet   Refills:  1         CONTINUE these medicines which have NOT CHANGED        Dose / Directions    ARIPiprazole 2 MG tablet   Commonly known as:  ABILIFY   Used for:  Adjustment disorder with mixed anxiety and depressed mood, Anxiety        Dose:  2 mg   Take 1 tablet (2 mg) by mouth At Bedtime   Quantity:  90 tablet   Refills:  3       cetirizine 10 MG tablet   Commonly known as:  zyrTEC        Dose:  10 mg   Take 10 mg by mouth daily   Refills:  0       cyclobenzaprine 10 MG tablet   Commonly known as:  FLEXERIL   Used for:  Chronic bilateral low back pain with left-sided sciatica        Dose:  10 mg   Take 1 tablet (10 mg) by mouth 2 times daily as needed for muscle spasms or other (back pain)   Quantity:  60 tablet   Refills:  3       dexlansoprazole 60 MG Cpdr CR capsule   Commonly known as:  DEXILANT   Used for:  Gastroesophageal reflux disease without esophagitis        Dose:   60 mg   Take 1 capsule (60 mg) by mouth daily   Quantity:  90 capsule   Refills:  3       diazepam 5 MG tablet   Commonly known as:  VALIUM   Used for:  Chronic pain syndrome, Chronic bilateral low back pain with left-sided sciatica, Lumbar disc disease with radiculopathy, S/P lumbar fusion        Dose:  2.5-5 mg   Take 0.5-1 tablets (2.5-5 mg) by mouth every 6 hours as needed for anxiety or sleep (MUSCLE SPASM)   Quantity:  20 tablet   Refills:  0       dicyclomine 20 MG tablet   Commonly known as:  BENTYL   Used for:  Hx of Crohn's disease        Dose:  20 mg   Take 1 tablet (20 mg) by mouth 4 times daily as needed (ABDOMINAL CRAMPING) AS NEEDED FOR CRAMPING   Quantity:  30 tablet   Refills:  3       DULoxetine 60 MG EC capsule   Commonly known as:  CYMBALTA   Used for:  Adjustment disorder with mixed anxiety and depressed mood        Dose:  60 mg   Take 1 capsule (60 mg) by mouth daily   Quantity:  90 capsule   Refills:  3       EPINEPHrine 0.3 MG/0.3ML injection   Used for:  Bee sting allergy        Dose:  0.3 mg   Inject 0.3 mLs (0.3 mg) into the muscle once as needed for anaphylaxis   Quantity:  0.6 mL   Refills:  1       FLEX-A-MIN JOINT FLEX PO        Take by mouth daily Reported on 3/28/2017   Refills:  0       fluticasone 50 MCG/ACT spray   Commonly known as:  FLONASE   Used for:  Seasonal allergic rhinitis due to pollen        Dose:  2 spray   Spray 2 sprays into both nostrils daily   Quantity:  1 Bottle   Refills:  3       gabapentin 300 MG capsule   Commonly known as:  NEURONTIN   Used for:  Chronic bilateral low back pain with left-sided sciatica        Dose:  300 mg   Take 1 capsule (300 mg) by mouth 3 times daily   Quantity:  270 capsule   Refills:  1       lidocaine 5 % Patch   Commonly known as:  LIDODERM   Used for:  Lumbar disc disease with radiculopathy        Apply up to 3 patches to painful area at once for up to 12 h within a 24 h period.  Remove after 12 hours.   Quantity:  30 patch    Refills:  3       meclizine 12.5 MG tablet   Commonly known as:  ANTIVERT   Used for:  Dizziness        Dose:  12.5 mg   Take 1 tablet (12.5 mg) by mouth 4 times daily as needed for dizziness   Quantity:  30 tablet   Refills:  0       medical cannabis inhalation (Patient's own supply.  Not a prescription)        Inhale into the lungs 3 times daily as needed Reported on 3/28/2017   Refills:  0       Multi-vitamin Tabs tablet   Generic drug:  multivitamin, therapeutic with minerals        Dose:  1 tablet   Take 1 tablet by mouth daily Reported on 3/28/2017   Refills:  0       ondansetron 4 MG ODT tab   Commonly known as:  ZOFRAN ODT   Used for:  Nausea        Dose:  4-8 mg   Take 1-2 tablets (4-8 mg) by mouth every 6 hours as needed for nausea Take 1-2 tablets by mouth as needed for nausea   Quantity:  30 tablet   Refills:  3       pantoprazole 40 MG EC tablet   Commonly known as:  PROTONIX   Used for:  Gastroesophageal reflux disease without esophagitis        Dose:  40 mg   Take 1 tablet (40 mg) by mouth daily Take 30-60 minutes before a meal.   Quantity:  90 tablet   Refills:  3       promethazine 25 MG tablet   Commonly known as:  PHENERGAN   Used for:  Hx of Crohn's disease        Dose:  25 mg   Take 1 tablet (25 mg) by mouth every 6 hours as needed for nausea   Quantity:  60 tablet   Refills:  3       senna-docusate 8.6-50 MG per tablet   Commonly known as:  SENOKOT-S;PERICOLACE   Used for:  Drug-induced constipation        Dose:  1 tablet   Take 1 tablet by mouth 2 times daily as needed for constipation   Quantity:  60 tablet   Refills:  3         STOP taking     Hydrocodone-Ibuprofen 5-200 MG Tabs           naproxen 500 MG tablet   Commonly known as:  NAPROSYN                Where to get your medicines      These medications were sent to [facility] [place]  [place], [place] - [address]  [address], [place] [address]     Phone:  [phone]     sucralfate 1 GM/10ML suspension          Some of these will need a paper prescription and others can be bought over the counter. Ask your nurse if you have questions.     Bring a paper prescription for each of these medications     HYDROmorphone 2 MG tablet                Protect others around you: Learn how to safely use, store and throw away your medicines at www.disposemymeds.org.             Medication List: This is a list of all your medications and when to take them. Check marks below indicate your daily home schedule. Keep this list as a reference.      Medications           Morning Afternoon Evening Bedtime As Needed    ARIPiprazole 2 MG tablet   Commonly known as:  ABILIFY   Take 1 tablet (2 mg) by mouth At Bedtime                                   cetirizine 10 MG tablet   Commonly known as:  zyrTEC   Take 10 mg by mouth daily                                   cyclobenzaprine 10 MG tablet   Commonly known as:  FLEXERIL   Take 1 tablet (10 mg) by mouth 2 times daily as needed for muscle spasms or other (back pain)                                   dexlansoprazole 60 MG Cpdr CR capsule   Commonly known as:  DEXILANT   Take 1 capsule (60 mg) by mouth daily                                   diazepam 5 MG tablet   Commonly known as:  VALIUM   Take 0.5-1 tablets (2.5-5 mg) by mouth every 6 hours as needed for anxiety or sleep (MUSCLE SPASM)                                   dicyclomine 20 MG tablet   Commonly known as:  BENTYL   Take 1 tablet (20 mg) by mouth 4 times daily as needed (ABDOMINAL CRAMPING) AS NEEDED FOR CRAMPING                                   DULoxetine 60 MG EC capsule   Commonly known as:  CYMBALTA   Take 1 capsule (60 mg) by mouth daily                                   EPINEPHrine 0.3 MG/0.3ML injection   Inject 0.3 mLs (0.3 mg) into the muscle once as needed for anaphylaxis                                   FLEX-A-MIN JOINT FLEX PO   Take by mouth daily Reported on 3/28/2017                                   fluticasone 50  MCG/ACT spray   Commonly known as:  FLONASE   Spray 2 sprays into both nostrils daily                                   gabapentin 300 MG capsule   Commonly known as:  NEURONTIN   Take 1 capsule (300 mg) by mouth 3 times daily                                         HYDROmorphone 2 MG tablet   Commonly known as:  DILAUDID   Take 1 tablet (2 mg) by mouth every 4 hours as needed for severe pain   Last time this was given:  2 mg on 7/2/2017 12:44 PM                                   lidocaine 5 % Patch   Commonly known as:  LIDODERM   Apply up to 3 patches to painful area at once for up to 12 h within a 24 h period.  Remove after 12 hours.                                   meclizine 12.5 MG tablet   Commonly known as:  ANTIVERT   Take 1 tablet (12.5 mg) by mouth 4 times daily as needed for dizziness                                   medical cannabis inhalation (Patient's own supply.  Not a prescription)   Inhale into the lungs 3 times daily as needed Reported on 3/28/2017                                   Multi-vitamin Tabs tablet   Take 1 tablet by mouth daily Reported on 3/28/2017   Generic drug:  multivitamin, therapeutic with minerals                                   ondansetron 4 MG ODT tab   Commonly known as:  ZOFRAN ODT   Take 1-2 tablets (4-8 mg) by mouth every 6 hours as needed for nausea Take 1-2 tablets by mouth as needed for nausea                                   pantoprazole 40 MG EC tablet   Commonly known as:  PROTONIX   Take 1 tablet (40 mg) by mouth daily Take 30-60 minutes before a meal.   Last time this was given:  40 mg on 7/2/2017  8:35 AM                                   promethazine 25 MG tablet   Commonly known as:  PHENERGAN   Take 1 tablet (25 mg) by mouth every 6 hours as needed for nausea                                   rizatriptan 5 MG tablet   Commonly known as:  MAXALT   Take 1-2 tablets (5-10 mg) by mouth at onset of headache for migraine May repeat in 2 hours. Max 6 tablets/24  hours.                                   senna-docusate 8.6-50 MG per tablet   Commonly known as:  SENOKOT-S;PERICOLACE   Take 1 tablet by mouth 2 times daily as needed for constipation                                   sucralfate 1 GM/10ML suspension   Commonly known as:  CARAFATE   Take 10 mLs (1 g) by mouth 4 times daily (before meals and nightly)   Last time this was given:  1 g on 7/2/2017 11:11 AM                                                      More Information        Patient Education    Hydromorphone Hydrochloride Oral solution    Hydromorphone Hydrochloride Oral tablet    Hydromorphone Hydrochloride Oral tablet, extended-release    Hydromorphone Hydrochloride Rectal suppository    Hydromorphone Hydrochloride Solution for injection  Hydromorphone Hydrochloride Oral tablet  What is this medicine?  HYDROMORPHONE (lexii droe MOR fone) is a pain reliever. It is used to treat moderate to severe pain.  This medicine may be used for other purposes; ask your health care provider or pharmacist if you have questions.  What should I tell my health care provider before I take this medicine?  They need to know if you have any of these conditions:    brain tumor    drug abuse or addiction    head injury    heart disease    frequently drink alcohol containing drinks    kidney disease or problems going to the bathroom    liver disease    lung disease, asthma, or breathing problems    mental problems    an allergic or unusual reaction to lactose, hydromorphone, other opioid analgesics, other medicines, sulfites, foods, dyes, or preservatives    pregnant or trying to get pregnant    breast-feeding  How should I use this medicine?  Take this medicine by mouth with a glass of water. If the medicine upsets your stomach, take it with food or milk. Follow the directions on the prescription label. Do not take more medicine than you are told to take.   Talk to your pediatrician regarding the use of this medicine in children.  Special care may be needed.  Overdosage: If you think you have taken too much of this medicine contact a poison control center or emergency room at once.  NOTE: This medicine is only for you. Do not share this medicine with others.  What if I miss a dose?  If you miss a dose, take it as soon as you can. If it is almost time for your next dose, take only that dose. Do not take double or extra doses.  What may interact with this medicine?    alcohol    antihistamines for allergy, cough and cold    medicines for anesthesia    medicines for depression, anxiety, or psychotic disturbances    medicines for sleep    muscle relaxants    naltrexone    narcotic medicines (opiates) for pain    phenothiazines like chlorpromazine, mesoridazine, prochlorperazine, thioridazine    tramadol  This list may not describe all possible interactions. Give your health care provider a list of all the medicines, herbs, non-prescription drugs, or dietary supplements you use. Also tell them if you smoke, drink alcohol, or use illegal drugs. Some items may interact with your medicine.  What should I watch for while using this medicine?  Tell your doctor or health care professional if your pain does not go away, if it gets worse, or if you have new or a different type of pain. You may develop tolerance to the medicine. Tolerance means that you will need a higher dose of the medicine for pain relief. Tolerance is normal and is expected if you take this medicine for a long time.  Do not suddenly stop taking your medicine because you may develop a severe reaction. Your body becomes used to the medicine. This does NOT mean you are addicted. Addiction is a behavior related to getting and using a drug for a non-medical reason. If you have pain, you have a medical reason to take pain medicine. Your doctor will tell you how much medicine to take. If your doctor wants you to stop the medicine, the dose will be slowly lowered over time to avoid any side  effects.  You may get drowsy or dizzy. Do not drive, use machinery, or do anything that needs mental alertness until you know how this medicine affects you. Do not stand or sit up quickly, especially if you are an older patient. This reduces the risk of dizzy or fainting spells. Alcohol may interfere with the effect of this medicine. Avoid alcoholic drinks.  There are different types of narcotic medicines (opiates) for pain. If you take more than one type at the same time, you may have more side effects. Give your health care provider a list of all medicines you use. Your doctor will tell you how much medicine to take. Do not take more medicine than directed. Call emergency for help if you have problems breathing.  This medicine will cause constipation. Try to have a bowel movement at least every 2 to 3 days. If you do not have a bowel movement for 3 days, call your doctor or health care professional.  Your mouth may get dry. Chewing sugarless gum or sucking hard candy, and drinking plenty of water may help. Contact your doctor if the problem does not go away or is severe.  What side effects may I notice from receiving this medicine?  Side effects that you should report to your doctor or health care professional as soon as possible:    allergic reactions like skin rash, itching or hives, swelling of the face, lips, or tongue    breathing problems    changes in vision    confusion    feeling faint or lightheaded, falls    seizures    slow or fast heartbeat    trouble passing urine or change in the amount of urine    trouble with balance, talking, walking    unusually weak or tired  Side effects that usually do not require medical attention (report to your doctor or health care professional if they continue or are bothersome):    difficulty sleeping    drowsiness    dry mouth    flushing    headache    itching    loss of appetite    nausea, vomiting  This list may not describe all possible side effects. Call your  doctor for medical advice about side effects. You may report side effects to FDA at 0-989-FDA-6210.  Where should I keep my medicine?  Keep out of the reach of children. This medicine can be abused. Keep your medicine in a safe place to protect it from theft. Do not share this medicine with anyone. Selling or giving away this medicine is dangerous and against the law.  Store at room temperature between 15 and 30 degrees C (59 and 86 degrees F). Keep container tightly closed. Protect from light.  This medicine may cause accidental overdose and death if it is taken by other adults, children, or pets. Flush any unused medicine down the toilet to reduce the chance of harm. Do not use the medicine after the expiration date.  NOTE: This sheet is a summary. It may not cover all possible information. If you have questions about this medicine, talk to your doctor, pharmacist, or health care provider.  NOTE:This sheet is a summary. It may not cover all possible information. If you have questions about this medicine, talk to your doctor, pharmacist, or health care provider. Copyright  2016 Gold Standard                Patient Education    Sucralfate Oral suspension    Sucralfate Oral tablet  Sucralfate Oral tablet  What is this medicine?  SUCRALFATE (CALEB dorian fate) helps to treat ulcers of the intestine.  This medicine may be used for other purposes; ask your health care provider or pharmacist if you have questions.  What should I tell my health care provider before I take this medicine?  They need to know if you have any of these conditions:    kidney disease    an unusual or allergic reaction to sucralfate, other medicines, foods, dyes, or preservatives    pregnant or trying to get pregnant    breast-feeding  How should I use this medicine?  Take this medicine by mouth with a glass of water. Follow the directions on the prescription label. This medicine works best if you take it on an empty stomach, 1 hour before meals. Take  your doses at regular intervals. Do not take your medicine more often than directed. Do not stop taking except on your doctor's advice.  Talk to your pediatrician regarding the use of this medicine in children. Special care may be needed.  Overdosage: If you think you have taken too much of this medicine contact a poison control center or emergency room at once.  NOTE: This medicine is only for you. Do not share this medicine with others.  What if I miss a dose?  If you miss a dose, take it as soon as you can. If it is almost time for your next dose, take only that dose. Do not take double or extra doses.  What may interact with this medicine?    antacid    cimetidine    digoxin    ketoconazole    phenytoin    quinidine    ranitidine    some antibiotics like ciprofloxacin, norfloxacin, and ofloxacin    theophylline    thyroid hormones    warfarin  This list may not describe all possible interactions. Give your health care provider a list of all the medicines, herbs, non-prescription drugs, or dietary supplements you use. Also tell them if you smoke, drink alcohol, or use illegal drugs. Some items may interact with your medicine.  What should I watch for while using this medicine?  Visit your doctor or health care professional for regular check ups. Let your doctor know if your symptoms do not improve or if you feel worse.  Antacids should not be taken within one half hour before or after this medicine.  What side effects may I notice from receiving this medicine?  Side effects that you should report to your doctor or health care professional as soon as possible:    allergic reactions like skin rash, itching or hives, swelling of the face, lips, or tongue    difficulty breathing  Side effects that usually do not require medical attention (report to your doctor or health care professional if they continue or are bothersome):    back pain    constipation    drowsy, dizzy    dry mouth    headache    stomach upset,  gas    trouble sleeping  This list may not describe all possible side effects. Call your doctor for medical advice about side effects. You may report side effects to FDA at 4-878-WVS-0036.  Where should I keep my medicine?  Keep out of the reach of children.  Store at room temperature between 15 and 30 degrees C (59 and 86 degrees F). Keep container tightly closed. Throw away any unused medicine after the expiration date.  NOTE:This sheet is a summary. It may not cover all possible information. If you have questions about this medicine, talk to your doctor, pharmacist, or health care provider. Copyright  2016 Gold Standard

## 2017-07-01 NOTE — ED NOTES
Mother is leaving now and pt is requesting an  for when she goes upstairs , if they have a lot of questions.  Explained we have an ipad service that will  for her. Pt became upset it wasn't a real person. Mother explained the service and pt just told her to leave .

## 2017-07-01 NOTE — IP AVS SNAPSHOT
[address] [facility]    [address] [place] [address]    Phone:  [phone]                                       After Visit Summary   7/1/2017    Katy Islas    MRN: [id number]           After Visit Summary Signature Page     I have received my discharge instructions, and my questions have been answered. I have discussed any challenges I see with this plan with the nurse or doctor.    ..........................................................................................................................................  Patient/Patient Representative Signature      ..........................................................................................................................................  Patient Representative Print Name and Relationship to Patient    ..................................................               ................................................  Date                                            Time    ..........................................................................................................................................  Reviewed by Signature/Title    ...................................................              ..............................................  Date                                                            Time

## 2017-07-01 NOTE — PROGRESS NOTES
S-(situation): Patient registered to Observation. Patient arrived to room 272 from ER    B-(background): N/ abd pain,      A-(assessment): Patient arrived to room on cart and able to transfer to the bed with minimal assistance. VSS, afebrile RA. C/o abd pain and fullness with nausea. Patient understands they can use the  interpretor machine when requested in the room; and would like the live interpretor when meeting with the doctor; they are okay with speaking (minimally) to the nurse and NA at this time.       R-(recommendations): Orders and observation goals reviewed with patient with an understanding.    Nursing Observation criteria listed below was met:    Skin issues/needs documented:NA  Isolation needs addressed, if appropriate: NA  Fall Prevention: Education given and documented: Yes  Education Assessment documented:Yes  Education Documented (Pre-existing chronic infection such as, MRSA/VRE need education on admission): Yes  New medication patient education completed and documented (Possible Side Effects of Common Medications handout): Yes  Home medications if not able to send immediately home with family stored here: NA  Reminder note placed in discharge instructions: NA  Patient has discharge needs (If yes, please explain): No

## 2017-07-02 ENCOUNTER — MYC MEDICAL ADVICE (OUTPATIENT)
Dept: FAMILY MEDICINE | Facility: OTHER | Age: 27
End: 2017-07-02

## 2017-07-02 VITALS
SYSTOLIC BLOOD PRESSURE: 115 MMHG | HEART RATE: 80 BPM | HEIGHT: 62 IN | TEMPERATURE: 98.4 F | BODY MASS INDEX: 28.6 KG/M2 | RESPIRATION RATE: 16 BRPM | DIASTOLIC BLOOD PRESSURE: 71 MMHG | OXYGEN SATURATION: 97 % | WEIGHT: 155.42 LBS

## 2017-07-02 LAB — HGB BLD-MCNC: 12.2 G/DL (ref 11.7–15.7)

## 2017-07-02 PROCEDURE — 25000132 ZZH RX MED GY IP 250 OP 250 PS 637: Mod: GY | Performed by: PEDIATRICS

## 2017-07-02 PROCEDURE — 25000128 H RX IP 250 OP 636: Performed by: INTERNAL MEDICINE

## 2017-07-02 PROCEDURE — 85018 HEMOGLOBIN: CPT | Performed by: INTERNAL MEDICINE

## 2017-07-02 PROCEDURE — 99217 ZZC OBSERVATION CARE DISCHARGE: CPT | Performed by: PEDIATRICS

## 2017-07-02 PROCEDURE — 36415 COLL VENOUS BLD VENIPUNCTURE: CPT | Performed by: INTERNAL MEDICINE

## 2017-07-02 PROCEDURE — A9270 NON-COVERED ITEM OR SERVICE: HCPCS | Mod: GY | Performed by: PEDIATRICS

## 2017-07-02 PROCEDURE — G0378 HOSPITAL OBSERVATION PER HR: HCPCS

## 2017-07-02 PROCEDURE — 25000132 ZZH RX MED GY IP 250 OP 250 PS 637: Mod: GY | Performed by: INTERNAL MEDICINE

## 2017-07-02 PROCEDURE — 96376 TX/PRO/DX INJ SAME DRUG ADON: CPT

## 2017-07-02 PROCEDURE — A9270 NON-COVERED ITEM OR SERVICE: HCPCS | Mod: GY | Performed by: INTERNAL MEDICINE

## 2017-07-02 RX ORDER — SUCRALFATE 1 G/1
1 TABLET ORAL 4 TIMES DAILY
Qty: 40 TABLET | Refills: 0 | Status: SHIPPED | OUTPATIENT
Start: 2017-07-02 | End: 2017-07-02

## 2017-07-02 RX ORDER — HYDROMORPHONE HYDROCHLORIDE 2 MG/1
2 TABLET ORAL EVERY 4 HOURS PRN
Qty: 20 TABLET | Refills: 0 | Status: SHIPPED | OUTPATIENT
Start: 2017-07-02 | End: 2017-07-06

## 2017-07-02 RX ORDER — SUCRALFATE ORAL 1 G/10ML
1 SUSPENSION ORAL
Qty: 420 ML | Refills: 0 | Status: SHIPPED | OUTPATIENT
Start: 2017-07-02 | End: 2017-07-06

## 2017-07-02 RX ADMIN — HYDROMORPHONE HYDROCHLORIDE 2 MG: 2 TABLET ORAL at 08:35

## 2017-07-02 RX ADMIN — SUCRALFATE 1 G: 1 SUSPENSION ORAL at 08:35

## 2017-07-02 RX ADMIN — SODIUM CHLORIDE: 9 INJECTION, SOLUTION INTRAVENOUS at 06:00

## 2017-07-02 RX ADMIN — SUCRALFATE 1 G: 1 SUSPENSION ORAL at 11:11

## 2017-07-02 RX ADMIN — PANTOPRAZOLE SODIUM 40 MG: 40 TABLET, DELAYED RELEASE ORAL at 08:35

## 2017-07-02 RX ADMIN — HYDROMORPHONE HYDROCHLORIDE 2 MG: 2 TABLET ORAL at 01:34

## 2017-07-02 RX ADMIN — HYDROMORPHONE HYDROCHLORIDE 2 MG: 2 TABLET ORAL at 12:44

## 2017-07-02 RX ADMIN — ONDANSETRON HYDROCHLORIDE 4 MG: 2 INJECTION, SOLUTION INTRAMUSCULAR; INTRAVENOUS at 11:11

## 2017-07-02 RX ADMIN — PROMETHAZINE HYDROCHLORIDE 25 MG: 25 INJECTION INTRAMUSCULAR; INTRAVENOUS at 10:33

## 2017-07-02 NOTE — PLAN OF CARE
Patient was given toast and two apple juices per request.  will be here at 1200, MD will at that time round on the patient. Patients mother should be present at the time as well.

## 2017-07-02 NOTE — PLAN OF CARE
Problem: Goal Outcome Summary  Goal: Goal Outcome Summary  Outcome: Improving  Tolerating clear liquid diet w/o nausea, started on oral narcotics, required oral benadryl for itching, walked halls independently no nausea/vomiting/stools

## 2017-07-02 NOTE — DISCHARGE SUMMARY
[facility] Observation Discharge Summary    Katy Islas MRN# [id number]   Age: 27 year old YOB: 1990     Date of Observation:  7/1/2017  Date of Discharge:  7/2/2017   Initial Physician:  Stone Long MD  Discharge Physician:  Will Jurado MD     Home clinic: [facility]  Primary care provider: Aura Rosario          Admission Diagnoses:   Abdominal pain, epigastric [R10.13]  Chronic pain syndrome [G89.4]  GI bleed due to NSAIDs [K92.2, T39.395A]  Gastroesophageal reflux disease with esophagitis [K21.0]  Crohn's colitis, other complication (H) [K50.118]  Bipolar affective disorder, remission status unspecified (H) [F31.9]  Chronic bilateral low back pain with left-sided sciatica [M54.42, G89.29]  Nausea and vomiting, intractability of vomiting not specified, unspecified vomiting type [R11.2]  Bilateral hearing loss, unspecified hearing loss type [H91.93]          Discharge Diagnoses:   Principal diagnosis: <principal problem not specified>    Secondary diagnoses:    Hearing loss    Crohn's colitis (H)    Bipolar disorder (H)    Gastroesophageal reflux disease without esophagitis    Mild persistent asthma without complication    Chronic bilateral low back pain with left-sided sciatica    Upper GI bleed - suspected    Anemia due to blood loss, acute    Tobacco abuse    History of pseudoseizure    * No resolved hospital problems. *            Procedures:   No procedures performed during this hospital stay           Discharge Medications:     Outpatient Prescriptions Marked as Taking for the 7/1/17 encounter (Hospital Encounter)   Medication Sig     HYDROmorphone (DILAUDID) 2 MG tablet Take 1 tablet (2 mg) by mouth every 4 hours as needed for severe pain     sucralfate (CARAFATE) 1 GM/10ML suspension Take 10 mLs (1 g) by mouth 4 times daily (before meals and nightly)     cetirizine (ZYRTEC) 10 MG tablet Take 10 mg by mouth daily     dicyclomine  (BENTYL) 20 MG tablet Take 1 tablet (20 mg) by mouth 4 times daily as needed (ABDOMINAL CRAMPING) AS NEEDED FOR CRAMPING     promethazine (PHENERGAN) 25 MG tablet Take 1 tablet (25 mg) by mouth every 6 hours as needed for nausea     pantoprazole (PROTONIX) 40 MG EC tablet Take 1 tablet (40 mg) by mouth daily Take 30-60 minutes before a meal.     diazepam (VALIUM) 5 MG tablet Take 0.5-1 tablets (2.5-5 mg) by mouth every 6 hours as needed for anxiety or sleep (MUSCLE SPASM)     rizatriptan (MAXALT) 5 MG tablet Take 1-2 tablets (5-10 mg) by mouth at onset of headache for migraine May repeat in 2 hours. Max 6 tablets/24 hours.     ondansetron (ZOFRAN ODT) 4 MG ODT tab Take 1-2 tablets (4-8 mg) by mouth every 6 hours as needed for nausea Take 1-2 tablets by mouth as needed for nausea     dexlansoprazole (DEXILANT) 60 MG CPDR CR capsule Take 1 capsule (60 mg) by mouth daily     gabapentin (NEURONTIN) 300 MG capsule Take 1 capsule (300 mg) by mouth 3 times daily     lidocaine (LIDODERM) 5 % Patch Apply up to 3 patches to painful area at once for up to 12 h within a 24 h period.  Remove after 12 hours.     cyclobenzaprine (FLEXERIL) 10 MG tablet Take 1 tablet (10 mg) by mouth 2 times daily as needed for muscle spasms or other (back pain)     senna-docusate (SENOKOT-S;PERICOLACE) 8.6-50 MG per tablet Take 1 tablet by mouth 2 times daily as needed for constipation     meclizine (ANTIVERT) 12.5 MG tablet Take 1 tablet (12.5 mg) by mouth 4 times daily as needed for dizziness     fluticasone (FLONASE) 50 MCG/ACT spray Spray 2 sprays into both nostrils daily     medical cannabis inhalation (Patient's own supply.  Not a prescription) Inhale into the lungs 3 times daily as needed Reported on 3/28/2017     ARIPiprazole (ABILIFY) 2 MG tablet Take 1 tablet (2 mg) by mouth At Bedtime     DULoxetine (CYMBALTA) 60 MG EC capsule Take 1 capsule (60 mg) by mouth daily     EPINEPHrine (EPIPEN) 0.3 MG/0.3ML injection Inject 0.3 mLs (0.3 mg)  into the muscle once as needed for anaphylaxis     Misc Natural Products (FLEX-A-MIN JOINT FLEX PO) Take by mouth daily Reported on 3/28/2017     Multiple Vitamin (MULTI-VITAMIN) per tablet Take 1 tablet by mouth daily Reported on 3/28/2017       Current Discharge Medication List      START taking these medications    Details   HYDROmorphone (DILAUDID) 2 MG tablet Take 1 tablet (2 mg) by mouth every 4 hours as needed for severe pain  Qty: 20 tablet, Refills: 0    Associated Diagnoses: Abdominal pain, epigastric      sucralfate (CARAFATE) 1 GM/10ML suspension Take 10 mLs (1 g) by mouth 4 times daily (before meals and nightly)  Qty: 420 mL, Refills: 0    Associated Diagnoses: Abdominal pain, epigastric; Hematemesis with nausea         CONTINUE these medications which have NOT CHANGED    Details   cetirizine (ZYRTEC) 10 MG tablet Take 10 mg by mouth daily      dicyclomine (BENTYL) 20 MG tablet Take 1 tablet (20 mg) by mouth 4 times daily as needed (ABDOMINAL CRAMPING) AS NEEDED FOR CRAMPING  Qty: 30 tablet, Refills: 3    Associated Diagnoses: Hx of Crohn's disease      promethazine (PHENERGAN) 25 MG tablet Take 1 tablet (25 mg) by mouth every 6 hours as needed for nausea  Qty: 60 tablet, Refills: 3    Associated Diagnoses: Hx of Crohn's disease      pantoprazole (PROTONIX) 40 MG EC tablet Take 1 tablet (40 mg) by mouth daily Take 30-60 minutes before a meal.  Qty: 90 tablet, Refills: 3    Associated Diagnoses: Gastroesophageal reflux disease without esophagitis      diazepam (VALIUM) 5 MG tablet Take 0.5-1 tablets (2.5-5 mg) by mouth every 6 hours as needed for anxiety or sleep (MUSCLE SPASM)  Qty: 20 tablet, Refills: 0    Associated Diagnoses: Chronic pain syndrome; Chronic bilateral low back pain with left-sided sciatica; Lumbar disc disease with radiculopathy; S/P lumbar fusion      rizatriptan (MAXALT) 5 MG tablet Take 1-2 tablets (5-10 mg) by mouth at onset of headache for migraine May repeat in 2 hours. Max 6  tablets/24 hours.  Qty: 18 tablet, Refills: 1    Associated Diagnoses: Migraine with aura and without status migrainosus, not intractable      ondansetron (ZOFRAN ODT) 4 MG ODT tab Take 1-2 tablets (4-8 mg) by mouth every 6 hours as needed for nausea Take 1-2 tablets by mouth as needed for nausea  Qty: 30 tablet, Refills: 3    Associated Diagnoses: Nausea      dexlansoprazole (DEXILANT) 60 MG CPDR CR capsule Take 1 capsule (60 mg) by mouth daily  Qty: 90 capsule, Refills: 3    Associated Diagnoses: Gastroesophageal reflux disease without esophagitis      gabapentin (NEURONTIN) 300 MG capsule Take 1 capsule (300 mg) by mouth 3 times daily  Qty: 270 capsule, Refills: 1    Associated Diagnoses: Chronic bilateral low back pain with left-sided sciatica      lidocaine (LIDODERM) 5 % Patch Apply up to 3 patches to painful area at once for up to 12 h within a 24 h period.  Remove after 12 hours.  Qty: 30 patch, Refills: 3    Associated Diagnoses: Lumbar disc disease with radiculopathy      cyclobenzaprine (FLEXERIL) 10 MG tablet Take 1 tablet (10 mg) by mouth 2 times daily as needed for muscle spasms or other (back pain)  Qty: 60 tablet, Refills: 3    Associated Diagnoses: Chronic bilateral low back pain with left-sided sciatica      senna-docusate (SENOKOT-S;PERICOLACE) 8.6-50 MG per tablet Take 1 tablet by mouth 2 times daily as needed for constipation  Qty: 60 tablet, Refills: 3    Associated Diagnoses: Drug-induced constipation      meclizine (ANTIVERT) 12.5 MG tablet Take 1 tablet (12.5 mg) by mouth 4 times daily as needed for dizziness  Qty: 30 tablet, Refills: 0    Associated Diagnoses: Dizziness      fluticasone (FLONASE) 50 MCG/ACT spray Spray 2 sprays into both nostrils daily  Qty: 1 Bottle, Refills: 3    Associated Diagnoses: Seasonal allergic rhinitis due to pollen      medical cannabis inhalation (Patient's own supply.  Not a prescription) Inhale into the lungs 3 times daily as needed Reported on 3/28/2017       ARIPiprazole (ABILIFY) 2 MG tablet Take 1 tablet (2 mg) by mouth At Bedtime  Qty: 90 tablet, Refills: 3    Associated Diagnoses: Adjustment disorder with mixed anxiety and depressed mood; Anxiety      DULoxetine (CYMBALTA) 60 MG EC capsule Take 1 capsule (60 mg) by mouth daily  Qty: 90 capsule, Refills: 3    Associated Diagnoses: Adjustment disorder with mixed anxiety and depressed mood      EPINEPHrine (EPIPEN) 0.3 MG/0.3ML injection Inject 0.3 mLs (0.3 mg) into the muscle once as needed for anaphylaxis  Qty: 0.6 mL, Refills: 1    Associated Diagnoses: Bee sting allergy      Misc Natural Products (FLEX-A-MIN JOINT FLEX PO) Take by mouth daily Reported on 3/28/2017      Multiple Vitamin (MULTI-VITAMIN) per tablet Take 1 tablet by mouth daily Reported on 3/28/2017         STOP taking these medications       Hydrocodone-Ibuprofen 5-200 MG TABS Comments:   Reason for Stopping:         naproxen (NAPROSYN) 500 MG tablet Comments:   Reason for Stopping:         rizatriptan (MAXALT-MLT) 5 MG ODT tab Comments:   Reason for Stopping:                     Consultations:   No consultations were requested during this hospital stay          Brief History of Illness:   27 year old female patient with complicated health history including possible history of Crohn's disease and chronic pain presented with 1 days history of worsening nausea, vomiting and hematemesis.   Due to concern for upper GI bleeding, she was hospitalized for observation. See ER note and history and physical for details.          Hospital Course:   Patient was observed in the hospital.  Abdominal x-ray did not demonstrate any signs of bowel obstruction or free air.  Initial hemoglobin was 11.6, decreased from April 2017.  Hemoglobin dropped to 10.3 with IV fluid therapy but subsequently improved to 12.2 after discontinuation of IV fluids.  She was treated with IV fluids, continuous Protonix infusion, the addition of oral sucralfate, and symptomatic treatments  for nausea and abdominal pain.  She was advised to discontinue Vicoprofen and naproxen.  She remained hemodynamically stable throughout.  She described vulvar itching, but urinalysis was negative for pyuria and urine culture was negative on preliminary results.  She had no witnessed vomiting during his hospital stay and was able to tolerate adequate oral intake by discharge.  Pain was transiently relieved after GI cocktail administration containing viscous lidocaine and subsequently adequately controlled with oral analgesics including oral hydromorphone as needed for severe breakthrough pain.  She expressed preference to follow-up with her primary GI team through [facility].    I evaluated this patient on the day of discharge.  She remained hemodynamically stable.  She did not appear to be in any distress.  Abdomen was not distended and soft.  Epigastric tenderness was present.            Discharge Instructions and Follow-Up:   Discharge diet: Advance to a bland diet as tolerated   Discharge activity: Activity as tolerated   Discharge follow-up: Follow up with primary care provider within 2 weeks      Pending test results:   Unresulted Labs Ordered in the Past 30 Days of this Admission     Date and Time Order Name Status Description    7/1/2017 1050 Urine Culture Aerobic Bacterial Preliminary                Discharge Disposition:   Discharged to home      Attestation:  I have reviewed today's vital signs, notes, medications, and test results.    Will Jurado MD

## 2017-07-02 NOTE — PROGRESS NOTES
"Pt requesting that she wants something to eat.  Asked her want she wanted.  \"mcdonalds\". Her mother was present and pt asked if her mom could go get it for her.  I reinforced that the doctor has ordered clears.  She stated that she wanted \"real food\".    Toast offered.  She didn't want that but agreed that she would try.  Ate one piece and stated that she needed \"IV phenergan\" because she was nauseated.  Communicated to her nurse, and updated MD.  "

## 2017-07-02 NOTE — PROGRESS NOTES
S-(situation): Patient discharged to home via ambulatory with mom, escorted to pharmacy by RN.    B-(background): Observation goals met, last HGB-12.    A-(assessment): pain controlled with po dilaudid. Taking carafate. Tolerating solid food.  here from [id number] to assist in the discharge process. Pt given written script for dilaudid and other prescriptions sent to [facility].  Pt denies any questions regarding meds and DC instructions.      R-(recommendations): Discharge instructions reviewed with pt and mom with the assistance of . Listed belongings gathered and returned to patient.  Patient Education resolved: Yes  New medications-Pt. Has been educated about reason of use and side effects Yes  Home and hospital acquired medications returned to patient NA  Medication Bin checked and emptied on discharge Yes

## 2017-07-02 NOTE — PROGRESS NOTES
"S-(situation): end of shift note    B-(background): suspected upper GI bleed    A-(assessment): see progress notes from earlier in the night. Last evening pt stated had an emesis. No emesis was noted and pt stated \"I cleaned it all up\", motioning to her legs, \"I threw up in the sink\". Pt's gown was clean and dry and sink was clean and dry. Pt stated that emesis was orange colored from apple juice that had been drank and had \"bright red blood\" in it. No smell of emesis was noted. Pt informed to use emesis bag if had another emesis so we can check gastrocult and send to lab. Pt has been voiding well. No stools at this time. Pt had been given IV Dilaudid last evening per request and asked to received oral Dilaudid and was given around 0130. Pt then sleeping and when had demanded to have a live  at the bedside earlier in the night and  arrived when pt was sleeping, pt angry and refused to communicate with , provider, and nurse. Pt refused vs to be checked and refused hemoglobin draw from lab. Pt had tolerated approximately 400mL of clear liquids in the form of jello, flavored water ice, and water between 0100 and 0230.    R-(recommendations): will continue to monitor tolerance of clear liquids, pain, nausea.    "

## 2017-07-02 NOTE — PROVIDER NOTIFICATION
"Provider notified after primary nurse called me into room to talk to pt.  Pt dressed and is obviously mad.  She has an  on her phone and is communicating  with her.  We are writing our questions down and she is answering.  She is going to leave because she still feels like \"crap\" and wants a \"live \".  She did sign a patient waiver of  services yesterday and now states she wants a \"live\" at bedside  now.  Option for Ipad  services given and states she wants a live one only and that she has rights.   service contacted.  Dr. Long will see pt.    "

## 2017-07-02 NOTE — PROGRESS NOTES
Patient relayed to nursing in the evening yesterday she had thrown up blood again.  Her BP and pulse have been stable.  Her last three hgb checks have remained unchanged.  The emesis was not witnessed by nursing but she told nursing she threw up in the sink. The sink was checked and found to be dry.  A repeat hgb was ordered but she refused to have her blood drawn.  She had been tolerating clear liquids but said she got nauseated when she tried toast.  She then requested IV phenergan for the nausea which she was given. She then requested IV benadryl for itching even though oral benadryl was already available as an option.  She was then given IV benadryl. She then requested higher doses of IV dilaudid which she had previously had explained to her by Dr. Jurado that we were not going to use higher doses of IV narcotics but would offer her the same medicine orally.  She became upset with this.  She called her mother twice and had her drive in to see her but then kicked out her mother twice from her room.  She then requested an  right away.  She refused the electronic  and demanded a live .  An emergency , Anaya then came.  She however then refused to talk to the .  The  made several attempts to communicate with her so that I could discuss with her any specific concerns with me but she covered her head and refused to talk to the .  It should be noted she was resting comfortably in bed after receiving oral dilaudid. She has had no vomiting witnessed by nursing and her hgb, BP and pulse have all remained stable.  She will sleep through the night and will be safe discharge later this morning assuming she has no change in her current clinical status.  electronically signed by Stone Long M.D.

## 2017-07-02 NOTE — PROGRESS NOTES
"Pt had asked for increase in pain medication and for IV benadryl around 2245. Order obtained for IV benadryl and was given at 2336. Pt was informed that pain medication was not increased at this time. Pt was in fair spirits and communicated request for next pain medication to be oral Dilaudid because it \"takes longer to work, but lasts longer\". Pt's mother had came to pt's room and was in room shortly and after leaving pt's room, noise was heard of pt slamming closet door and moving things in room. Pt was dressed and yelling and wanting to leave, swearing, angry at mother who had left just prior. Pt attempting to call her mother back with her cell phone and signing with  via cell phone. Writer Pt refused to use video  that was in room and was writing back and forth to writer. Pt writing that she was still in pain and wanting to see a GI doctor, not wanting to wait to see Dr. Long when offered pt to communicate with Dr. Long before leaving [place]. Pt asking for a live  in her room, continuing to refuse the video , and writer informed pt that we will call for a live .   "

## 2017-07-03 LAB
BACTERIA SPEC CULT: NORMAL
MICRO REPORT STATUS: NORMAL
SPECIMEN SOURCE: NORMAL

## 2017-07-03 NOTE — PROGRESS NOTES
SUBJECTIVE:                                                    Katy Islas is a 27 year old female who presents to clinic today for the following health issues:      [facility]    Hospital Follow-up Visit:    Hospital/Nursing Home/IP Rehab Facility: [facility]  Date of Admission: 7/1/17  Date of Discharge: 7/2/17  Reason(s) for Admission: Abdominal Pain/ GI Bleed            Problems taking medications regularly:  None       Medication changes since discharge: Start: Hydromorphone (Dilaudid), Sucralfate (Carafate)       Problems adhering to non-medication therapy:  None    Summary of hospitalization:  [facility] discharge summary reviewed  Diagnostic Tests/Treatments reviewed.  Follow up needed: EGD and hemoglobin lab  Other Healthcare Providers Involved in Patient s Care:         Specialist appointment - [facility]   Update since discharge: improved.     - No [facility] follow up set up   - Sucralfate helps a ton   - Dilaudid works but makes her itchy, 4 every day, 6 if very bad    - Lots of back spasms yesterday   - Follow up with her spine doctor on 7/24/17      Post Discharge Medication Reconciliation: discharge medications reconciled, continue medications without change.  Plan of care communicated with patient     Coding guidelines for this visit:  Type of Medical   Decision Making Face-to-Face Visit       within 7 Days of discharge Face-to-Face Visit        within 14 days of discharge   Moderate Complexity [id number]   High Complexity 99469 [id number]            Problem list and histories reviewed & adjusted, as indicated.  Additional history: as documented    ROS:  Constitutional, HEENT, cardiovascular, pulmonary, gi and gu systems are negative, except as otherwise noted.    OBJECTIVE:   /84 (BP Location: Left arm, Patient Position: Chair, Cuff Size: Adult Regular)  Pulse 88  Temp 98.6  F (37  C) (Oral)  Resp 16  Wt 153 lb (69.4 kg)  LMP 06/03/2017 (Exact Date)  SpO2 100%  BMI 27.98  kg/m2  Body mass index is 27.98 kg/(m^2).  GENERAL APPEARANCE: healthy, alert and no distress  EYES: Eyes grossly normal to inspection, PERRLA, conjunctivae and sclerae without injection or discharge, EOM intact   ABDOMEN: Soft, nontender, no hepatosplenomegaly, no masses and bowel sounds normal   MS: No musculoskeletal defects are noted and gait is age appropriate without ataxia   SKIN: No suspicious lesions or rashes, hydration status appears adeuqate with normal skin turgor   BACK: Declined by patient   PSYCH: Alert and oriented x3; speech- coherent , normal rate and volume; able to articulate logical thoughts, able to abstract reason, no tangential thoughts, no hallucinations or delusions, mentation appears normal, Mood is euthymic. Affect is appropriate for this mood state and bright. Thought content is free of suicidal ideation, hallucinations, and delusions. Dress is adequate and upkept. Eye contact is good during conversation.       Diagnostic Test Results:  Results for orders placed or performed in visit on 07/06/17   Hemoglobin   Result Value Ref Range    Hemoglobin 12.9 11.7 - 15.7 g/dL     *Note: Due to a large number of results and/or encounters for the requested time period, some results have not been displayed. A complete set of results can be found in Results Review.          ASSESSMENT/PLAN:       ICD-10-CM    1. Upper GI bleed - suspected K92.2 Hemoglobin     GASTROENTEROLOGY ADULT REF PROCEDURE ONLY   2. Anemia due to blood loss, acute D62 Hemoglobin     GASTROENTEROLOGY ADULT REF PROCEDURE ONLY   3. Abdominal pain, epigastric R10.13 sucralfate (CARAFATE) 1 GM/10ML suspension     HYDROmorphone (DILAUDID) 2 MG tablet     DISCONTINUED: HYDROmorphone (DILAUDID) 2 MG tablet   4. Hematemesis with nausea K92.0 sucralfate (CARAFATE) 1 GM/10ML suspension    R11.0    5. Chronic bilateral low back pain with left-sided sciatica M54.42     G89.29    6. Chronic pain syndrome G89.4      1-4.   - Hemoglobin  improved from hospital discharge, no further follow up needed   - Patient already established with [facility] for her Crohn's and previous ulcer/bleed   - Wishes to go there for her EGD, order placed, should follow up with them as well regarding medications   - Should continue all medications until told otherwise from [facility], discussed importance to continue to allow bleed/ulcer to heal    5 & 6. Pain  - Pain medications changed by hospital due to NSAID induced GI bleed/ulcer   - We had slowly been tapering off meds, was taken off Fentanyl patches, oxycodone, and was down to Hydrocodone   - Patient is now on Dilaudid   - Discussed we will not have a taper, but just a wean off this medication   - Gave 2 - 2 weeks scripts, one for 5/day but should use sparingly, other script dated 2 weeks from today with 4/day   - Recheck 1 month   - Continue with plan to re-check with surgeon (patient s/p fusion on 2/241/7 Dr. Millan)   - Continue with physical therapy and water therapy  - Will plan to wean down at next appointment to 3/day, then have her space them out by increasing the hours between dosing, then down to 2/day and so on   - CSA 4/25/17, Utox - 6/19/17 continues to show THC again discussed need to stop while on pain medications, will continue taper plan as above   - Patient since 7/20/16, Activity - Easy, Adherence - Good, analgesia - Good, adverse effects - Constipation     The patient indicates understanding of these issues and agrees with the plan.    Follow up: 1 month     Due to language barrier, an  was present during the history-taking and subsequent discussion (and for part of the physical exam) with this patient.        Aura Rosario PA-C  [facility]

## 2017-07-06 ENCOUNTER — OFFICE VISIT (OUTPATIENT)
Dept: FAMILY MEDICINE | Facility: OTHER | Age: 27
End: 2017-07-06
Payer: MEDICARE

## 2017-07-06 VITALS
TEMPERATURE: 98.6 F | WEIGHT: 153 LBS | SYSTOLIC BLOOD PRESSURE: 122 MMHG | HEART RATE: 88 BPM | DIASTOLIC BLOOD PRESSURE: 84 MMHG | BODY MASS INDEX: 27.98 KG/M2 | RESPIRATION RATE: 16 BRPM | OXYGEN SATURATION: 100 %

## 2017-07-06 DIAGNOSIS — M54.42 CHRONIC BILATERAL LOW BACK PAIN WITH LEFT-SIDED SCIATICA: ICD-10-CM

## 2017-07-06 DIAGNOSIS — G89.4 CHRONIC PAIN SYNDROME: ICD-10-CM

## 2017-07-06 DIAGNOSIS — K92.0 HEMATEMESIS WITH NAUSEA: ICD-10-CM

## 2017-07-06 DIAGNOSIS — D62 ANEMIA DUE TO BLOOD LOSS, ACUTE: ICD-10-CM

## 2017-07-06 DIAGNOSIS — K92.2 UPPER GI BLEED: Primary | ICD-10-CM

## 2017-07-06 DIAGNOSIS — R10.13 ABDOMINAL PAIN, EPIGASTRIC: ICD-10-CM

## 2017-07-06 DIAGNOSIS — G89.29 CHRONIC BILATERAL LOW BACK PAIN WITH LEFT-SIDED SCIATICA: ICD-10-CM

## 2017-07-06 LAB — HGB BLD-MCNC: 12.9 G/DL (ref 11.7–15.7)

## 2017-07-06 PROCEDURE — 85018 HEMOGLOBIN: CPT | Performed by: PHYSICIAN ASSISTANT

## 2017-07-06 PROCEDURE — T1013 SIGN LANG/ORAL INTERPRETER: HCPCS | Mod: U3

## 2017-07-06 PROCEDURE — 99214 OFFICE O/P EST MOD 30 MIN: CPT | Performed by: PHYSICIAN ASSISTANT

## 2017-07-06 PROCEDURE — 36415 COLL VENOUS BLD VENIPUNCTURE: CPT | Performed by: PHYSICIAN ASSISTANT

## 2017-07-06 RX ORDER — HYDROMORPHONE HYDROCHLORIDE 2 MG/1
2 TABLET ORAL EVERY 4 HOURS PRN
Qty: 70 TABLET | Refills: 0 | Status: SHIPPED | OUTPATIENT
Start: 2017-07-06 | End: 2017-07-06

## 2017-07-06 RX ORDER — SUCRALFATE ORAL 1 G/10ML
1 SUSPENSION ORAL
Qty: 420 ML | Refills: 3 | Status: SHIPPED | OUTPATIENT
Start: 2017-07-06 | End: 2017-10-24

## 2017-07-06 RX ORDER — HYDROMORPHONE HYDROCHLORIDE 2 MG/1
2 TABLET ORAL EVERY 4 HOURS PRN
Qty: 56 TABLET | Refills: 0 | Status: SHIPPED | OUTPATIENT
Start: 2017-07-20 | End: 2017-07-17 | Stop reason: ALTCHOICE

## 2017-07-06 ASSESSMENT — PAIN SCALES - GENERAL: PAINLEVEL: MODERATE PAIN (4)

## 2017-07-06 NOTE — PROGRESS NOTES
Savana Burns    Your hemoglobin results were normal and improved from your discharge number.     The results are attached for your review.       Zach Rosario PA-C

## 2017-07-06 NOTE — NURSING NOTE
"Chief Complaint   Patient presents with     Hospital F/U     Panel Management     n/a       Initial /84 (BP Location: Left arm, Patient Position: Chair, Cuff Size: Adult Regular)  Pulse 88  Temp 98.6  F (37  C) (Oral)  Resp 16  Wt 153 lb (69.4 kg)  LMP 06/03/2017 (Exact Date)  SpO2 100%  BMI 27.98 kg/m2 Estimated body mass index is 27.98 kg/(m^2) as calculated from the following:    Height as of 7/1/17: 5' 2\" (1.575 m).    Weight as of this encounter: 153 lb (69.4 kg).  Medication Reconciliation: complete   Oneyda Tapia CMA (AAMA)      "

## 2017-07-06 NOTE — MR AVS SNAPSHOT
After Visit Summary   7/6/2017    Katy Islas    MRN: [id number]           Patient Information     Date Of Birth          1990        Visit Information        Provider Department      7/6/2017 10:30 AM Loida Quinonez; Aura Rosario PA-C [facility]        Today's Diagnoses     Upper GI bleed - suspected    -  1    Anemia due to blood loss, acute        Abdominal pain, epigastric        Hematemesis with nausea          Care Instructions    - Recheck 1 month           Follow-ups after your visit        Additional Services     GASTROENTEROLOGY ADULT REF PROCEDURE ONLY       Last Lab Result: Creatinine (mg/dL)       Date                     Value                 07/01/2017               0.70             ----------  Body mass index is 27.98 kg/(m^2).     Needed:  Yes  Language:  American Sign Language    Patient will be contacted to schedule procedure.     Please be aware that coverage of these services is subject to the terms and limitations of your health insurance plan.  Call member services at your health plan with any benefit or coverage questions.  Any procedures must be performed at a [place] facility OR coordinated by your clinic's referral office.    Please bring the following with you to your appointment:    (1) Any X-Rays, CTs or MRIs which have been performed.  Contact the facility where they were done to arrange for  prior to your scheduled appointment.    (2) List of current medications   (3) This referral request   (4) Any documents/labs given to you for this referral                  Your next 10 appointments already scheduled     Jul 11, 2017 10:30 AM CDT   Office Visit with Aura Rosario PA-C   [facility]    [address] [place] [address] [address]  [facility] [id number] [phone]           Bring a current list of meds and any records pertaining to this visit.  For  Physicals, please bring immunization records and any forms needing to be filled out.  Please arrive 10 minutes early to complete paperwork.            Jul 13, 2017  9:45 AM CDT   PROCEDURE with Aura Rosario PA-C, NL PROC ROOM 1ST FLOOR [facility] ([facility])    [address]  [facility] [address]   [phone]              Who to contact     If you have questions or need follow up information about today's clinic visit or your schedule please contact [facility] directly at [phone].  Normal or non-critical lab and imaging results will be communicated to you by eyefactivehart, letter or phone within 4 business days after the clinic has received the results. If you do not hear from us within 7 days, please contact the clinic through eyefactivehart or phone. If you have a critical or abnormal lab result, we will notify you by phone as soon as possible.  Submit refill requests through [employer] or call your pharmacy and they will forward the refill request to us. Please allow 3 business days for your refill to be completed.          Additional Information About Your Visit        eyefactiveharSwitchboard Information     [employer] gives you secure access to your electronic health record. If you see a primary care provider, you can also send messages to your care team and make appointments. If you have questions, please call your primary care clinic.  If you do not have a primary care provider, please call [phone] and they will assist you.        Care EveryWhere ID     This is your Care EveryWhere ID. This could be used by other organizations to access your [facility] medical records  [id number]        Your Vitals Were     Pulse Temperature Respirations Last Period Pulse Oximetry BMI (Body Mass Index)    88 98.6  F (37  C) (Oral) 16 06/03/2017 (Exact Date) 100% 27.98 kg/m2       Blood Pressure from Last 3 Encounters:   07/06/17 122/84   07/02/17 115/71   06/23/17  130/86    Weight from Last 3 Encounters:   07/06/17 153 lb (69.4 kg)   07/01/17 155 lb 6.8 oz (70.5 kg)   06/23/17 149 lb (67.6 kg)              We Performed the Following     GASTROENTEROLOGY ADULT REF PROCEDURE ONLY     Hemoglobin          Today's Medication Changes          These changes are accurate as of: 7/6/17 11:12 AM.  If you have any questions, ask your nurse or doctor.               Start taking these medicines.        Dose/Directions    HYDROmorphone 2 MG tablet   Commonly known as:  DILAUDID   Used for:  Abdominal pain, epigastric   Started by:  Aura Rosario PA-C        Dose:  2 mg   Start taking on:  7/20/2017   Take 1 tablet (2 mg) by mouth every 4 hours as needed for severe pain (Max 4/day)   Quantity:  56 tablet   Refills:  0            Where to get your medicines      These medications were sent to [facility] - [address]  [address], [facility] [address]     Phone:  [phone]     sucralfate 1 GM/10ML suspension         Some of these will need a paper prescription and others can be bought over the counter.  Ask your nurse if you have questions.     Bring a paper prescription for each of these medications     HYDROmorphone 2 MG tablet                Primary Care Provider Office Phone # Fax #    Aura Rosario PA-C [phone]       [address]  [facility] [address]        Equal Access to Services     VIKY VILLANUEVA AH: Hadii aad ku hadasho Sojannetteali, waaxda luqadaha, qaybta kaalmada adeegyada, waxay yoselin turner. So [facility] [phone].    ATENCIÓN: Si habla español, tiene a kerr disposición servicios gratuitos de asistencia lingüística. Llame al [phone].    We comply with applicable federal civil rights laws and [place] laws. We do not discriminate on the basis of race, color, national origin, age, disability sex, sexual orientation or gender  identity.            Thank you!     Thank you for choosing [facility]  for your care. Our goal is always to provide you with excellent care. Hearing back from our patients is one way we can continue to improve our services. Please take a few minutes to complete the written survey that you may receive in the mail after your visit with us. Thank you!             Your Updated Medication List - Protect others around you: Learn how to safely use, store and throw away your medicines at www.disposemymeds.org.          This list is accurate as of: 7/6/17 11:12 AM.  Always use your most recent med list.                   Brand Name Dispense Instructions for use Diagnosis    ARIPiprazole 2 MG tablet    ABILIFY    90 tablet    Take 1 tablet (2 mg) by mouth At Bedtime    Adjustment disorder with mixed anxiety and depressed mood, Anxiety       cetirizine 10 MG tablet    zyrTEC     Take 10 mg by mouth daily        cyclobenzaprine 10 MG tablet    FLEXERIL    60 tablet    Take 1 tablet (10 mg) by mouth 2 times daily as needed for muscle spasms or other (back pain)    Chronic bilateral low back pain with left-sided sciatica       dexlansoprazole 60 MG Cpdr CR capsule    DEXILANT    90 capsule    Take 1 capsule (60 mg) by mouth daily    Gastroesophageal reflux disease without esophagitis       diazepam 5 MG tablet    VALIUM    20 tablet    Take 0.5-1 tablets (2.5-5 mg) by mouth every 6 hours as needed for anxiety or sleep (MUSCLE SPASM)    Chronic pain syndrome, Chronic bilateral low back pain with left-sided sciatica, Lumbar disc disease with radiculopathy, S/P lumbar fusion       dicyclomine 20 MG tablet    BENTYL    30 tablet    Take 1 tablet (20 mg) by mouth 4 times daily as needed (ABDOMINAL CRAMPING) AS NEEDED FOR CRAMPING    Hx of Crohn's disease       DULoxetine 60 MG EC capsule    CYMBALTA    90 capsule    Take 1 capsule (60 mg) by mouth daily    Adjustment disorder with mixed anxiety and depressed mood        EPINEPHrine 0.3 MG/0.3ML injection     0.6 mL    Inject 0.3 mLs (0.3 mg) into the muscle once as needed for anaphylaxis    Bee sting allergy       FLEX-A-MIN JOINT FLEX PO      Take by mouth daily Reported on 3/28/2017        fluticasone 50 MCG/ACT spray    FLONASE    1 Bottle    Spray 2 sprays into both nostrils daily    Seasonal allergic rhinitis due to pollen       gabapentin 300 MG capsule    NEURONTIN    270 capsule    Take 1 capsule (300 mg) by mouth 3 times daily    Chronic bilateral low back pain with left-sided sciatica       HYDROmorphone 2 MG tablet   Start taking on:  7/20/2017    DILAUDID    56 tablet    Take 1 tablet (2 mg) by mouth every 4 hours as needed for severe pain (Max 4/day)    Abdominal pain, epigastric       lidocaine 5 % Patch    LIDODERM    30 patch    Apply up to 3 patches to painful area at once for up to 12 h within a 24 h period.  Remove after 12 hours.    Lumbar disc disease with radiculopathy       meclizine 12.5 MG tablet    ANTIVERT    30 tablet    Take 1 tablet (12.5 mg) by mouth 4 times daily as needed for dizziness    Dizziness       medical cannabis inhalation (Patient's own supply.  Not a prescription)      Inhale into the lungs 3 times daily as needed Reported on 3/28/2017        Multi-vitamin Tabs tablet   Generic drug:  multivitamin, therapeutic with minerals      Take 1 tablet by mouth daily Reported on 3/28/2017        ondansetron 4 MG ODT tab    ZOFRAN ODT    30 tablet    Take 1-2 tablets (4-8 mg) by mouth every 6 hours as needed for nausea Take 1-2 tablets by mouth as needed for nausea    Nausea       pantoprazole 40 MG EC tablet    PROTONIX    90 tablet    Take 1 tablet (40 mg) by mouth daily Take 30-60 minutes before a meal.    Gastroesophageal reflux disease without esophagitis       promethazine 25 MG tablet    PHENERGAN    60 tablet    Take 1 tablet (25 mg) by mouth every 6 hours as needed for nausea    Hx of Crohn's disease       rizatriptan 5 MG tablet    MAXALT     18 tablet    Take 1-2 tablets (5-10 mg) by mouth at onset of headache for migraine May repeat in 2 hours. Max 6 tablets/24 hours.    Migraine with aura and without status migrainosus, not intractable       senna-docusate 8.6-50 MG per tablet    SENOKOT-S;PERICOLACE    60 tablet    Take 1 tablet by mouth 2 times daily as needed for constipation    Drug-induced constipation       sucralfate 1 GM/10ML suspension    CARAFATE    420 mL    Take 10 mLs (1 g) by mouth 4 times daily (before meals and nightly)    Abdominal pain, epigastric, Hematemesis with nausea

## 2017-07-06 NOTE — TELEPHONE ENCOUNTER
Patient is scheduled for today. Will close encounter and discuss today.    Zach Rosario PA-C  [facility]

## 2017-07-07 ENCOUNTER — TELEPHONE (OUTPATIENT)
Dept: FAMILY MEDICINE | Facility: CLINIC | Age: 27
End: 2017-07-07

## 2017-07-07 ENCOUNTER — TELEPHONE (OUTPATIENT)
Dept: FAMILY MEDICINE | Facility: OTHER | Age: 27
End: 2017-07-07

## 2017-07-07 DIAGNOSIS — G89.4 CHRONIC PAIN SYNDROME: ICD-10-CM

## 2017-07-07 DIAGNOSIS — M51.16 LUMBAR DISC DISEASE WITH RADICULOPATHY: ICD-10-CM

## 2017-07-07 DIAGNOSIS — G40.409 GRAND MAL SEIZURE DISORDER (H): Primary | ICD-10-CM

## 2017-07-07 DIAGNOSIS — M54.42 CHRONIC BILATERAL LOW BACK PAIN WITH LEFT-SIDED SCIATICA: ICD-10-CM

## 2017-07-07 DIAGNOSIS — Z98.1 S/P LUMBAR FUSION: ICD-10-CM

## 2017-07-07 DIAGNOSIS — G89.29 CHRONIC BILATERAL LOW BACK PAIN WITH LEFT-SIDED SCIATICA: ICD-10-CM

## 2017-07-07 RX ORDER — DIAZEPAM 5 MG
2.5-5 TABLET ORAL EVERY 6 HOURS PRN
Qty: 20 TABLET | Refills: 0 | Status: SHIPPED | OUTPATIENT
Start: 2017-07-07 | End: 2017-07-27

## 2017-07-07 NOTE — TELEPHONE ENCOUNTER
Mother called back stated pt is not going to be home on that day and wanted to know if there is other day like the following week ? Please call back

## 2017-07-07 NOTE — TELEPHONE ENCOUNTER
Refill signed and placed in MA task.   Neurology referral placed       [facility]: [facility] [phone]   [email or web address]    Zach Tomlinson-LOVE Moss  [facility]

## 2017-07-07 NOTE — TELEPHONE ENCOUNTER
Patient was called to schedule upper endoscopy but she is going outside of [facility] to have this done. Her appointment is today 07/07/17. I will remove the order for this.

## 2017-07-07 NOTE — TELEPHONE ENCOUNTER
valium      Last Written Prescription Date:  05/30/2017  Last Fill Quantity: 20,   # refills: 0  Last Office Visit with [facility], [facility] or [facility] prescribing provider: 07/06/2017  Future Office visit:    Next 5 appointments (look out 90 days)     Aug 03, 2017 10:45 AM CDT   Office Visit with Aura Rosario PA-C   [facility]    [address] [place] [address]   [phone]                   Routing refill request to provider for review/approval because:  Drug not on the [facility], [facility] or [facility] refill protocol or controlled substance    Also needs referral for Neuro.

## 2017-07-07 NOTE — TELEPHONE ENCOUNTER
Rx delivered to [facility] pharmacy.  Pt scheduled to see neurology at [facility].for 7/14/17 9:00.  LM for pt to call back to let her know.

## 2017-07-07 NOTE — TELEPHONE ENCOUNTER
Reason for Call:  Medication or medication refill:    Do you use a [facility]?  Name of the pharmacy and phone number for the current request:  [facility]  [phone]    Name of the medication requested: diazepam (VALIUM) 5 MG tablet    Other request: pt also is needing a referral to a neurologist for seizures as pt states her license was suspended and needs a neurologist that will sign a release for pt to renew license. Please advise and contact pt mother with any questions Aysha at [phone]    Can we leave a detailed message on this number? YES    Phone number patient can be reached at: Home number on file [phone] (home)    Best Time: ANY    Call taken on 7/7/2017 at 9:41 AM by Faina Pinon

## 2017-07-13 ENCOUNTER — TELEPHONE (OUTPATIENT)
Dept: FAMILY MEDICINE | Facility: OTHER | Age: 27
End: 2017-07-13

## 2017-07-13 NOTE — PROGRESS NOTES
SUBJECTIVE:                                                    Katy Islas is a 27 year old female who presents to clinic today for the following health issues:    HPI    Pt states being more agitated and had thoughts of hurting herself when on the new pain medication, mom thinks its her med dilaudid.     - Dilaudid too strong  - Plus something personal happen, something bad on FB, wanted to hurt self, no plan though    - Crabby   - Angry easily   - SI thoughts now gone   - Oxycodone not really made itchy   - Took 1 dilaudid this morning   - Did not take yesterday   - Pain very bad   - 40+ pain tablets left, 45-50 tablets left   - Pharmacy has bag to get rid of pills   - Doesn't have with her   - Maxed out on physical therapy, won't pay anymore   - [facility] and [facility]   - Surgeon recheck 7/24/17 and CT before appointment (in [place])   - Frustrated because Hydrocodone with ibuprofen worked best but can't take due to GI bleed   - 2 gabapentin at bedtime, makes tired, could increase this?   - Can give to mom to control   - Had EGD, looked good, no change to medications, no recheck planned, continue with medications as is       Protnonix, sucrafate, dexilant for this       Back Pain Follow Up      Description:   Location of pain:  Left lower back  Character of pain: dull ache  Pain radiation: radiates into the left buttocks and radiates below the knee   Since last visit, pain is:  worsened  New numbness or weakness in legs, not attributed to pain:  no     Intensity: Currently 4/10    History:   Pain interferes with job: Not applicable  Therapies tried without relief: naproxen  Therapies tried with relief: hot baths, flexeril at night       Accompanying Signs & Symptoms:  Risk of Fracture:  None  Risk of Cauda Equina:  None  Risk of Infection:  None  Risk of Cancer:  None      Plan from last visit 7/6/17  5 & 6. Pain  - Pain medications changed by hospital due to NSAID induced GI bleed/ulcer   - We had  slowly been tapering off meds, was taken off Fentanyl patches, oxycodone, and was down to Hydrocodone   - Patient is now on Dilaudid   - Discussed we will not have a taper, but just a wean off this medication   - Gave 2 - 2 weeks scripts, one for 5/day but should use sparingly, other script dated 2 weeks from today with 4/day   - Recheck 1 month   - Continue with plan to re-check with surgeon (patient s/p fusion on 2/241/7 Dr. Millan)   - Continue with physical therapy and water therapy  - Will plan to wean down at next appointment to 3/day, then have her space them out by increasing the hours between dosing, then down to 2/day and so on   - CSA 4/25/17, Utox - 6/19/17 continues to show THC again discussed need to stop while on pain medications, will continue taper plan as above   - Patient since 7/20/16, Activity - Easy, Adherence - Good, analgesia - Good, adverse effects - Constipation           Problem list and histories reviewed & adjusted, as indicated.  Additional history: as documented    ROS:  Constitutional, HEENT, cardiovascular, pulmonary, gi and gu systems are negative, except as otherwise noted.    OBJECTIVE:   /78 (BP Location: Left arm, Patient Position: Chair, Cuff Size: Adult Regular)  Pulse 106  Temp 99.2  F (37.3  C) (Oral)  Wt 155 lb (70.3 kg)  LMP 06/03/2017 (Exact Date)  SpO2 99%  BMI 28.35 kg/m2  Body mass index is 28.35 kg/(m^2).  GENERAL APPEARANCE: healthy, alert and no distress  EYES: Eyes grossly normal to inspection, PERRLA, conjunctivae and sclerae without injection or discharge, EOM intact    MS: No musculoskeletal defects are noted and gait is age appropriate without ataxia   SKIN: No suspicious lesions or rashes, hydration status appears adeuqate with normal skin turgor   BACK: Deferred   PSYCH: Alert and oriented x3; speech- coherent , normal rate and volume; able to articulate logical thoughts, able to abstract reason, no tangential thoughts, no hallucinations or  delusions, mentation appears normal, Mood is euthymic. Affect is appropriate for this mood state and bright. Thought content is free of suicidal ideation, hallucinations, and delusions. Dress is adequate and upkept. Eye contact is good during conversation.       Diagnostic Test Results:  none     ASSESSMENT/PLAN:       ICD-10-CM    1. Chronic bilateral low back pain with left-sided sciatica M54.42     G89.29    2. S/P lumbar fusion Z98.1 oxyCODONE (ROXICODONE) 5 MG IR tablet   3. Chronic pain syndrome G89.4 oxyCODONE (ROXICODONE) 5 MG IR tablet   4. Upper GI bleed - suspected K92.2      1-3. Pain   - Discussed with patient we need to stop switching medications so much. I will do this one final time but now we need to wean off oxycodone   - GERMAN on file to speak to Dr. Millan, but advised patient to ask him to send note to me when completed after their visit on 7/24/17  - Will have patient bring in Dilaudid and dispose of in front of pharmacist (they have bags for disposal for purchase for $4)   - Will then have her start Oxycodone 5 mg, max 4/day      This will allow a decrease and step down in therapy              Was on Dilaudid 2 mg, 5/day, MME 40               Now on Oxycodone 5 mg, 4/day, MME 30   - Will also have her taper up her gabapentin as grogginess is tolerated, try to get to 5/day (currently on 2/day due to sedation) see patient instructions for taper plan   - Recheck in 2 weeks   -  reviewed no concerns   - CSA 4/25/17, Utox 6/19/17 had small amount of THC, patient states stopped   - Continue with exercise   - Continue with plan to recheck with surgeon   - Discussed above plan with Malou Butler, pharmacist [facility] [place]     4. Bleed  - Had repeat EGD that was clear   - Discussed needs to stay on all 3 medications for 6-8 weeks to ensure healing   - Can discuss possibly removing one of medications in a few months   - Continue to avoid all NSAIDs     The patient indicates understanding of  these issues and agrees with the plan.    Follow up: 2 weeks       Aura Rosario PA-C  [facility]

## 2017-07-13 NOTE — TELEPHONE ENCOUNTER
patient wants to see [facility] tomorrow for a med check. She said the Dilaudid is making her angry.  Can you work her in?

## 2017-07-13 NOTE — PROGRESS NOTES
SUBJECTIVE:                                                    Katy Islas is a 27 year old female who presents to clinic today for the following health issues:    HPI    Patient here for mole removal after consult       Problem list and histories reviewed & adjusted, as indicated.  Additional history: as documented    ROS:  Constitutional, HEENT, cardiovascular, pulmonary, gi and gu systems are negative, except as otherwise noted.    OBJECTIVE:   /82 (BP Location: Right arm, Patient Position: Chair, Cuff Size: Adult Regular)  Pulse 95  Temp 98.3  F (36.8  C) (Oral)  Resp 12  Wt 150 lb (68 kg)  SpO2 100%  BMI 27.44 kg/m2  Body mass index is 27.44 kg/(m^2).  GENERAL APPEARANCE: healthy, alert and no distress  EYES: Eyes grossly normal to inspection, PERRLA, conjunctivae and sclerae without injection or discharge, EOM intact   MS: No musculoskeletal defects are noted and gait is age appropriate without ataxia   SKIN:   #1) Mid back - 6 mm atypical ovular mole with irregular borders and multiple colors, flat  #2) Right lower back - 5 mm circular mole with irregular borders and multiple colors, flat   #3) Left upper back - 4 mm oval mole with irregular borders and multiple colors, flat     No other suspicious lesions or rashes, hydration status appears adeuqate with normal skin turgor   PSYCH: Alert and oriented x3; speech- coherent , normal rate and volume; able to articulate logical thoughts, able to abstract reason, no tangential thoughts, no hallucinations or delusions, mentation appears normal, Mood is euthymic. Affect is appropriate for this mood state and bright. Thought content is free of suicidal ideation, hallucinations, and delusions. Dress is adequate and upkept. Eye contact is good during conversation.       Diagnostic Test Results:  Surg path - pending       Procedure Note:  Pause for the cause has been completed prior to the prceedure.   1. Patient was identified by both name and date  of birth   2. The correct site was identified   3. Site was marked by provider    4. Written informed consent correct and signed or verbal authorization  to proceed was obtained   5. Verifed necessary supplies, equipment, and diagnostics are available    6. Time out was performed immediately prior to procedure    Objective: The lesion(s) is/are of the above mentioned size and location and is/are atypical.     #1) Mid back - The area was prepped using alcohol swabs and appropriately anesthetized using 2 cc of 1% lidocaine with epi. Using the usual technique, punch biopsy size 6 mm was performed. Hemostasis was obtained using 2 single interrupted sutures of 3-0 ethilon. An appropriate dressing was applied. The procedure was well tolerated and without complications.    #1) Right lower back - The area was prepped using alcohol swabs and appropriately anesthetized using 2 cc of 1% lidocaine with epi. Using the usual technique, punch biopsy size 5 mm was performed. Hemostasis was obtained using 2 single interrupted sutures of 3-0 ethilon. An appropriate dressing was applied. The procedure was well tolerated and without complications.    #1) Left upper back - The area was prepped using alcohol swabs and appropriately anesthetized using 1.5 cc of 1% lidocaine with epi. Using the usual technique, punch biopsy size 5 mm was performed. Hemostasis was obtained using 2 single interrupted sutures of 3-0 ethilon. An appropriate dressing was applied. The procedure was well tolerated and without complications.        ASSESSMENT/PLAN:       ICD-10-CM    1. Atypical mole D22.9 Surgical pathology exam     BIOPSY SKIN/SUBQ/MUC MEM, SINGLE LESION     BIOPSY SKIN/SUBQ/MUC MEM, EACH ADDTL LESION     BIOPSY SKIN/SUBQ/MUC MEM, EACH ADDTL LESION     - Consent was obtained   - Procedure as above, well tolerated   - Await pathology results   - Patient was instructed on wound care and hand out given  - Suture removal in 10-14 days    Due to  language barrier, an  was present during the history-taking and subsequent discussion (and for part of the physical exam) with this patient.    The patient indicates understanding of these issues and agrees with the plan.    Follow up: suture removal 10-14 days         Aura Rosario PA-C  [facility]

## 2017-07-13 NOTE — TELEPHONE ENCOUNTER
Spoke with patient through relay service  States that   IV site is painful. Not red, not inflamed.  No fever. Advised if symptoms worsen: redness, fever develops to go to ED, will keep appointment for tomorrow.    Appointment scheduled for tomorrow.    Iggy Bernstein, RN, BSN

## 2017-07-14 ENCOUNTER — OFFICE VISIT (OUTPATIENT)
Dept: FAMILY MEDICINE | Facility: OTHER | Age: 27
End: 2017-07-14
Payer: MEDICARE

## 2017-07-14 VITALS
WEIGHT: 155 LBS | SYSTOLIC BLOOD PRESSURE: 108 MMHG | TEMPERATURE: 99.2 F | HEART RATE: 106 BPM | OXYGEN SATURATION: 99 % | DIASTOLIC BLOOD PRESSURE: 78 MMHG | BODY MASS INDEX: 28.35 KG/M2

## 2017-07-14 DIAGNOSIS — G89.29 CHRONIC BILATERAL LOW BACK PAIN WITH LEFT-SIDED SCIATICA: Primary | ICD-10-CM

## 2017-07-14 DIAGNOSIS — K92.2 UPPER GI BLEED: ICD-10-CM

## 2017-07-14 DIAGNOSIS — M54.42 CHRONIC BILATERAL LOW BACK PAIN WITH LEFT-SIDED SCIATICA: Primary | ICD-10-CM

## 2017-07-14 DIAGNOSIS — Z98.1 S/P LUMBAR FUSION: ICD-10-CM

## 2017-07-14 DIAGNOSIS — G89.4 CHRONIC PAIN SYNDROME: ICD-10-CM

## 2017-07-14 PROCEDURE — 99214 OFFICE O/P EST MOD 30 MIN: CPT | Performed by: PHYSICIAN ASSISTANT

## 2017-07-14 RX ORDER — OXYCODONE HYDROCHLORIDE 5 MG/1
5 TABLET ORAL EVERY 6 HOURS PRN
Qty: 56 TABLET | Refills: 0 | Status: SHIPPED | OUTPATIENT
Start: 2017-07-14 | End: 2017-07-27

## 2017-07-14 ASSESSMENT — PAIN SCALES - GENERAL: PAINLEVEL: MODERATE PAIN (4)

## 2017-07-14 NOTE — MR AVS SNAPSHOT
After Visit Summary   7/14/2017    Katy Islas    MRN: [id number]           Patient Information     Date Of Birth          1990        Visit Information        Provider Department      7/14/2017 9:45 AM Aura Rosario PA-C; [facility]        Today's Diagnoses     S/P lumbar fusion        Chronic pain syndrome          Care Instructions    - Recheck 2 weeks from today     - Try 1 gabapentin in the morning and 3 at night      If going ok, can also add 1 tablet at lunch (slowly increase to 5/day)        (increase by 1 every 2-3 days)   - Oxycodone max 4/day               Follow-ups after your visit        Your next 10 appointments already scheduled     Jul 17, 2017  9:00 AM CDT   PROCEDURE with Aura Rosario PA-C, NL PROC ROOM 1ST FLOOR [facility] ([facility])    [address] [id number] [phone]            Jul 21, 2017 11:00 AM CDT   New Visit with Isabelle Villanueva MD   [facility]    [address] [place] [id number]   [phone]            Aug 03, 2017 10:45 AM CDT   Office Visit with Aura Rosario PA-C   [facility]    [address] [place] [id number] [phone]           Bring a current list of meds and any records pertaining to this visit.  For Physicals, please bring immunization records and any forms needing to be filled out.  Please arrive 10 minutes early to complete paperwork.              Who to contact     If you have questions or need follow up information about today's clinic visit or your schedule please contact [facility] directly at [phone].  Normal or non-critical lab and imaging results will be communicated to you by MyChart, letter or phone within 4 business days after the clinic has  received the results. If you do not hear from us within 7 days, please contact the clinic through [employer] or phone. If you have a critical or abnormal lab result, we will notify you by phone as soon as possible.  Submit refill requests through [employer] or call your pharmacy and they will forward the refill request to us. Please allow 3 business days for your refill to be completed.          Additional Information About Your Visit        [employer] Information     [employer] gives you secure access to your electronic health record. If you see a primary care provider, you can also send messages to your care team and make appointments. If you have questions, please call your primary care clinic.  If you do not have a primary care provider, please call [phone] and they will assist you.        Care EveryWhere ID     This is your Care EveryWhere ID. This could be used by other organizations to access your [facility] medical records  [id number]        Your Vitals Were     Pulse Temperature Last Period Pulse Oximetry BMI (Body Mass Index)       106 99.2  F (37.3  C) (Oral) 06/03/2017 (Exact Date) 99% 28.35 kg/m2        Blood Pressure from Last 3 Encounters:   07/14/17 108/78   07/06/17 122/84   07/02/17 115/71    Weight from Last 3 Encounters:   07/14/17 155 lb (70.3 kg)   07/06/17 153 lb (69.4 kg)   07/01/17 155 lb 6.8 oz (70.5 kg)              Today, you had the following     No orders found for display         Today's Medication Changes          These changes are accurate as of: 7/14/17 10:34 AM.  If you have any questions, ask your nurse or doctor.               Start taking these medicines.        Dose/Directions    oxyCODONE 5 MG IR tablet   Commonly known as:  ROXICODONE   Used for:  S/P lumbar fusion, Chronic pain syndrome   Started by:  Aura Rosario PA-C        Dose:  5 mg   Take 1 tablet (5 mg) by mouth every 6 hours as needed for pain maximum 4 tablet(s) per day   Quantity:  56 tablet   Refills:   0            Where to get your medicines      Some of these will need a paper prescription and others can be bought over the counter.  Ask your nurse if you have questions.     Bring a paper prescription for each of these medications     oxyCODONE 5 MG IR tablet                Primary Care Provider Office Phone # Fax #    Aura EMILY Rosario PA-C [phone]       [address]  [facility] [address]        Equal Access to Services     VIKY VILLANUEVA : Hadii aad ku hadasho Soomaali, waaxda luqadaha, qaybta kaalmada adeegyada, waxay idiin hayaan adeeg sadaaralakshmi laadri . So [facility] [phone].    ATENCIÓN: Si habla español, tiene a kerr disposición servicios gratuitos de asistencia lingüística. [facility] [phone].    We comply with applicable federal civil rights laws and [place] laws. We do not discriminate on the basis of race, color, national origin, age, disability sex, sexual orientation or gender identity.            Thank you!     Thank you for choosing [facility]  for your care. Our goal is always to provide you with excellent care. Hearing back from our patients is one way we can continue to improve our services. Please take a few minutes to complete the written survey that you may receive in the mail after your visit with us. Thank you!             Your Updated Medication List - Protect others around you: Learn how to safely use, store and throw away your medicines at www.disposemymeds.org.          This list is accurate as of: 7/14/17 10:34 AM.  Always use your most recent med list.                   Brand Name Dispense Instructions for use Diagnosis    ARIPiprazole 2 MG tablet    ABILIFY    90 tablet    Take 1 tablet (2 mg) by mouth At Bedtime    Adjustment disorder with mixed anxiety and depressed mood, Anxiety       cetirizine 10 MG tablet    zyrTEC     Take 10 mg by mouth daily        cyclobenzaprine 10 MG tablet    FLEXERIL    60  tablet    Take 1 tablet (10 mg) by mouth 2 times daily as needed for muscle spasms or other (back pain)    Chronic bilateral low back pain with left-sided sciatica       dexlansoprazole 60 MG Cpdr CR capsule    DEXILANT    90 capsule    Take 1 capsule (60 mg) by mouth daily    Gastroesophageal reflux disease without esophagitis       diazepam 5 MG tablet    VALIUM    20 tablet    Take 0.5-1 tablets (2.5-5 mg) by mouth every 6 hours as needed for anxiety or sleep (MUSCLE SPASM)    Chronic pain syndrome, Chronic bilateral low back pain with left-sided sciatica, Lumbar disc disease with radiculopathy, S/P lumbar fusion       dicyclomine 20 MG tablet    BENTYL    30 tablet    Take 1 tablet (20 mg) by mouth 4 times daily as needed (ABDOMINAL CRAMPING) AS NEEDED FOR CRAMPING    Hx of Crohn's disease       DULoxetine 60 MG EC capsule    CYMBALTA    90 capsule    Take 1 capsule (60 mg) by mouth daily    Adjustment disorder with mixed anxiety and depressed mood       EPINEPHrine 0.3 MG/0.3ML injection 2-pack    EPIPEN/ADRENACLICK/or ANY BX GENERIC EQUIV    0.6 mL    Inject 0.3 mLs (0.3 mg) into the muscle once as needed for anaphylaxis    Bee sting allergy       FLEX-A-MIN JOINT FLEX PO      Take by mouth daily Reported on 3/28/2017        fluticasone 50 MCG/ACT spray    FLONASE    1 Bottle    Spray 2 sprays into both nostrils daily    Seasonal allergic rhinitis due to pollen       gabapentin 300 MG capsule    NEURONTIN    270 capsule    Take 1 capsule (300 mg) by mouth 3 times daily    Chronic bilateral low back pain with left-sided sciatica       HYDROmorphone 2 MG tablet   Start taking on:  7/20/2017    DILAUDID    56 tablet    Take 1 tablet (2 mg) by mouth every 4 hours as needed for severe pain (Max 4/day)    Abdominal pain, epigastric       lidocaine 5 % Patch    LIDODERM    30 patch    Apply up to 3 patches to painful area at once for up to 12 h within a 24 h period.  Remove after 12 hours.    Lumbar disc disease with  radiculopathy       meclizine 12.5 MG tablet    ANTIVERT    30 tablet    Take 1 tablet (12.5 mg) by mouth 4 times daily as needed for dizziness    Dizziness       medical cannabis inhalation (Patient's own supply.  Not a prescription)      Inhale into the lungs 3 times daily as needed Reported on 3/28/2017        Multi-vitamin Tabs tablet   Generic drug:  multivitamin, therapeutic with minerals      Take 1 tablet by mouth daily Reported on 3/28/2017        ondansetron 4 MG ODT tab    ZOFRAN ODT    30 tablet    Take 1-2 tablets (4-8 mg) by mouth every 6 hours as needed for nausea Take 1-2 tablets by mouth as needed for nausea    Nausea       oxyCODONE 5 MG IR tablet    ROXICODONE    56 tablet    Take 1 tablet (5 mg) by mouth every 6 hours as needed for pain maximum 4 tablet(s) per day    S/P lumbar fusion, Chronic pain syndrome       pantoprazole 40 MG EC tablet    PROTONIX    90 tablet    Take 1 tablet (40 mg) by mouth daily Take 30-60 minutes before a meal.    Gastroesophageal reflux disease without esophagitis       promethazine 25 MG tablet    PHENERGAN    60 tablet    Take 1 tablet (25 mg) by mouth every 6 hours as needed for nausea    Hx of Crohn's disease       rizatriptan 5 MG tablet    MAXALT    18 tablet    Take 1-2 tablets (5-10 mg) by mouth at onset of headache for migraine May repeat in 2 hours. Max 6 tablets/24 hours.    Migraine with aura and without status migrainosus, not intractable       senna-docusate 8.6-50 MG per tablet    SENOKOT-S;PERICOLACE    60 tablet    Take 1 tablet by mouth 2 times daily as needed for constipation    Drug-induced constipation       sucralfate 1 GM/10ML suspension    CARAFATE    420 mL    Take 10 mLs (1 g) by mouth 4 times daily (before meals and nightly)    Abdominal pain, epigastric, Hematemesis with nausea

## 2017-07-14 NOTE — PATIENT INSTRUCTIONS
- Recheck 2 weeks from today     - Try 1 gabapentin in the morning and 3 at night      If going ok, can also add 1 tablet at lunch (slowly increase to 5/day)        (increase by 1 every 2-3 days)   - Oxycodone max 4/day

## 2017-07-14 NOTE — NURSING NOTE
"Chief Complaint   Patient presents with     RECHECK     Pain     at IV site     Panel Management     UTD       Initial /78 (BP Location: Left arm, Patient Position: Chair, Cuff Size: Adult Regular)  Pulse 106  Temp 99.2  F (37.3  C) (Oral)  Wt 155 lb (70.3 kg)  LMP 06/03/2017 (Exact Date)  SpO2 99%  BMI 28.35 kg/m2 Estimated body mass index is 28.35 kg/(m^2) as calculated from the following:    Height as of 7/1/17: 5' 2\" (1.575 m).    Weight as of this encounter: 155 lb (70.3 kg).  Medication Reconciliation: complete  "

## 2017-07-17 ENCOUNTER — OFFICE VISIT (OUTPATIENT)
Dept: FAMILY MEDICINE | Facility: OTHER | Age: 27
End: 2017-07-17
Payer: MEDICARE

## 2017-07-17 VITALS
RESPIRATION RATE: 12 BRPM | TEMPERATURE: 98.3 F | HEART RATE: 95 BPM | OXYGEN SATURATION: 100 % | WEIGHT: 150 LBS | DIASTOLIC BLOOD PRESSURE: 82 MMHG | BODY MASS INDEX: 27.44 KG/M2 | SYSTOLIC BLOOD PRESSURE: 118 MMHG

## 2017-07-17 DIAGNOSIS — D22.9 ATYPICAL MOLE: Primary | ICD-10-CM

## 2017-07-17 PROCEDURE — 88305 TISSUE EXAM BY PATHOLOGIST: CPT | Mod: 26 | Performed by: PHYSICIAN ASSISTANT

## 2017-07-17 PROCEDURE — 11401 EXC TR-EXT B9+MARG 0.6-1 CM: CPT | Performed by: PHYSICIAN ASSISTANT

## 2017-07-17 PROCEDURE — 11400 EXC TR-EXT B9+MARG 0.5 CM<: CPT | Performed by: PHYSICIAN ASSISTANT

## 2017-07-17 PROCEDURE — T1013 SIGN LANG/ORAL INTERPRETER: HCPCS | Mod: U3

## 2017-07-17 PROCEDURE — 88305 TISSUE EXAM BY PATHOLOGIST: CPT | Performed by: PHYSICIAN ASSISTANT

## 2017-07-17 NOTE — PATIENT INSTRUCTIONS
- Suture removal in 10-14 days     Wound Care Instructions    1.  Keep area dry today.    2.  Starting tomorrow wash gently with soap and water once daily.      3.  Apply Vaseline or antibiotic ointment to the area and cover with a bandage if desired.  Do not let it dry out and form a scab as this will make the resulting scar more noticeable.    4. Protect the area from sun for up to one year afterward as the scar is continuing to remodel.  Sun exposure will also make the resulting scar more noticeable.    5.  Call if the area is very red, tender, has a discharge or is very itchy while healing, or if you have any other questions.  These may be signs of early infection or allergy.                         Wound Closure and Wound Care  What is wound closure?   Wounds heal more quickly and with less risk of infection and scarring when the wound is cleaned and the wound edges are held together (closed). Scrapes, scratches, puncture wounds, and shallow cuts may need only cleaning, ointment, and a bandage. Some cuts may need to be closed with tape strips called Steri-Strips or tissue adhesive liquid (skin glue). If a cut or surgical incision is deep, very long, jagged, or under a lot of tension (such as a cut over a joint), stitches (also called sutures) or staples may be needed to close the wound.   How do I take care of my wound and sutures?   If you get an accidental cut, put pressure on the wound with a gauze pad or clean cloth right away to stop the bleeding. Then gently but thoroughly wash it with soap and cool water. Soapy water can be used around, but not in the cut. Try to remove all dirt and debris but do not scrub vigorously. If you decide to get medical treatment, cover the wound and apply pressure as needed to control bleeding while traveling to your healthcare provider's office, urgent care clinic, or emergency room.   After a wound is closed by your healthcare provider, the wound and the area around it must  "be kept clean and dry. The care of a stapled wound is similar to the care of a sutured wound. There are minor differences in caring for a wound closed with skin glue.   Do not let a wound closed with stitches or staples get wet for the first 24 hours. After 24 hours, you can shower or you can clean the wound with hydrogen peroxide or gently wash it with soap and warm water twice a day.   If your wound was closed with skin glue, keep the wound dry for the first 4 hours after the skin glue was put on. After the first 4 hours, you may occasionally and briefly wet the wound in the shower. You can clean the wound with hydrogen peroxide or gently wash it with soap and water twice a day.   If your wound is closed with Steri-Strips, they may be more likely to separate if they get wet. Keep them dry for the first few days while you're in the shower or bath.   Do not soak or scrub the wound. Do not take a bath, go swimming, or use a hot tub.   If recommended by your healthcare provider, you may put a small amount of antibiotic ointment on the wound each time you clean it. This can prevent infection. It will also help keep bandages from sticking to the wound. If a rash appears, stop using the ointment. If your wound is closed with skin glue, do not put liquid, antibiotic ointment, or any other product on the wound while the adhesive is in place. It may loosen the film before the wound is healed.   Make sure the wound is kept dry between washings. After showering or bathing, gently pat the wound dry with a soft towel.   Your healthcare provider may recommend that you cover the wound with gauze or a new, bandage to keep it from getting dirty. Be sure to keep the bandage dry. Put on a new bandage after cleaning the wound of if the old one gets dirty or wet.   Your healthcare provider may recommend leaving the wound \"open to air\" and not covered by a bandage while you sleep to help speed up the healing process. If the wound was " closed with skin glue, you do not need a bandage.   For the first 1 or 2 days keep the area propped up higher than your heart. This will help lessen your pain and any swelling.   Protect the wound from repeat injury until the skin has had time to heal.   Protect the wound from a lot of exposure to sunlight or tanning lamps while skin glue is in place. Wounds exposed to the sun can become red, while scars that have not been exposed to the sun usually turn white after a period of time.   Do not scratch, rub, or pick at your stitches, staples, or skin glue. This may cause them to loosen before the wound is healed.   Avoid activities that will make you sweat a lot until the skin glue has naturally fallen off or the stitches or staples have been removed.   Any wound can become infected. When you are cleaning your wound, look for these signs of infection:   increased redness   red streaks   increased swelling   increased pain or tenderness   pus or other drainage   warmth in the area of the wound   fever.   Contact your provider if you see any signs of infection.   If your wound was accidental, be sure to ask if a tetanus booster is needed. Treatment of accidental wounds may include taking an oral antibiotic to help prevent infection. Be sure to take the medicine until it is completely gone. Do not stop taking it just because the wound looks like it is healing well.   When are stitches, staples, or other types of wound closures removed?   Steri-Strips are usually left on until they fall off. If they have not fallen off after 2 weeks, they should be removed. Skin glue usually falls off on its own in 5 to 10 days.   For deep cuts the first stitches are placed under the skin. These stitches are made of materials that dissolve and do not need to be removed. Sutures or staples on the surface of the skin need to be removed by your healthcare provider 5 to 14 days after they are put in. The length of time depends on where the  cut is. Sutures in wounds on the face usually can be removed after 5 to 7 days. In areas of high stress, such as hands, knees, or elbows, the sutures must stay in 10 to 14 days. Your provider will tell you when you should come to the office for removal of your sutures or staples. Do NOT remove sutures or staples yourself unless your provider instructs you to do so. Staples are removed using a special tool. If you don't have the tool, don't try to remove the staples.   When should I call my healthcare provider?   Some swelling, redness, and pain are common with all wounds and normally go away as the wound heals.   Call your provider right away if:   You start to have any signs or symptoms of infection. These include:   Your skin is redder or more painful.   You have red streaks from the wound going toward your heart.   The wound area is very warm to touch.   You have pus or other fluid coming from the wound area.   You have a fever higher than 101.5? F (38.6? C).   You have chills, nausea, vomiting, or muscle aches.   The wound seems to be opening up or you notice any drainage.   The wound bleeds for more than 10 minutes.   The stitches or staples are loose.   The skin glue is loosening before it is supposed to.   You have any symptoms that worry you.     Published by [employer].  This content is reviewed periodically and is subject to change as new health information becomes available. The information is intended to inform and educate and is not a replacement for medical evaluation, advice, diagnosis or treatment by a healthcare professional.   Written by Quirino Mcnally MD.   ? 2010 [employer] and/or its affiliates. All Rights Reserved.   Copyright   [employer] 2011

## 2017-07-17 NOTE — NURSING NOTE
"Chief Complaint   Patient presents with     Lesion Removal     Panel Management     UTD       Initial /82 (BP Location: Right arm, Patient Position: Chair, Cuff Size: Adult Regular)  Pulse 95  Temp 98.3  F (36.8  C) (Oral)  Resp 12  Wt 150 lb (68 kg)  SpO2 100%  BMI 27.44 kg/m2 Estimated body mass index is 27.44 kg/(m^2) as calculated from the following:    Height as of 7/1/17: 5' 2\" (1.575 m).    Weight as of this encounter: 150 lb (68 kg).  Medication Reconciliation: complete  "

## 2017-07-17 NOTE — MR AVS SNAPSHOT
After Visit Summary   7/17/2017    Katy Islas    MRN: [id number]           Patient Information     Date Of Birth          1990        Visit Information        Provider Department      7/17/2017 9:00 AM Dayana Roldan; Aura Rosario, LOVE; [facility] PROC ROOM 1ST FLOOR [facility]        Today's Diagnoses     Atypical mole    -  1      Care Instructions    - Suture removal in 10-14 days     Wound Care Instructions    1.  Keep area dry today.    2.  Starting tomorrow wash gently with soap and water once daily.      3.  Apply Vaseline or antibiotic ointment to the area and cover with a bandage if desired.  Do not let it dry out and form a scab as this will make the resulting scar more noticeable.    4. Protect the area from sun for up to one year afterward as the scar is continuing to remodel.  Sun exposure will also make the resulting scar more noticeable.    5.  Call if the area is very red, tender, has a discharge or is very itchy while healing, or if you have any other questions.  These may be signs of early infection or allergy.                         Wound Closure and Wound Care  What is wound closure?   Wounds heal more quickly and with less risk of infection and scarring when the wound is cleaned and the wound edges are held together (closed). Scrapes, scratches, puncture wounds, and shallow cuts may need only cleaning, ointment, and a bandage. Some cuts may need to be closed with tape strips called Steri-Strips or tissue adhesive liquid (skin glue). If a cut or surgical incision is deep, very long, jagged, or under a lot of tension (such as a cut over a joint), stitches (also called sutures) or staples may be needed to close the wound.   How do I take care of my wound and sutures?   If you get an accidental cut, put pressure on the wound with a gauze pad or clean cloth right away to stop the bleeding. Then gently but thoroughly wash it with soap and cool  water. Soapy water can be used around, but not in the cut. Try to remove all dirt and debris but do not scrub vigorously. If you decide to get medical treatment, cover the wound and apply pressure as needed to control bleeding while traveling to your healthcare provider's office, urgent care clinic, or emergency room.   After a wound is closed by your healthcare provider, the wound and the area around it must be kept clean and dry. The care of a stapled wound is similar to the care of a sutured wound. There are minor differences in caring for a wound closed with skin glue.   Do not let a wound closed with stitches or staples get wet for the first 24 hours. After 24 hours, you can shower or you can clean the wound with hydrogen peroxide or gently wash it with soap and warm water twice a day.   If your wound was closed with skin glue, keep the wound dry for the first 4 hours after the skin glue was put on. After the first 4 hours, you may occasionally and briefly wet the wound in the shower. You can clean the wound with hydrogen peroxide or gently wash it with soap and water twice a day.   If your wound is closed with Steri-Strips, they may be more likely to separate if they get wet. Keep them dry for the first few days while you're in the shower or bath.   Do not soak or scrub the wound. Do not take a bath, go swimming, or use a hot tub.   If recommended by your healthcare provider, you may put a small amount of antibiotic ointment on the wound each time you clean it. This can prevent infection. It will also help keep bandages from sticking to the wound. If a rash appears, stop using the ointment. If your wound is closed with skin glue, do not put liquid, antibiotic ointment, or any other product on the wound while the adhesive is in place. It may loosen the film before the wound is healed.   Make sure the wound is kept dry between washings. After showering or bathing, gently pat the wound dry with a soft towel.  "  Your healthcare provider may recommend that you cover the wound with gauze or a new, bandage to keep it from getting dirty. Be sure to keep the bandage dry. Put on a new bandage after cleaning the wound of if the old one gets dirty or wet.   Your healthcare provider may recommend leaving the wound \"open to air\" and not covered by a bandage while you sleep to help speed up the healing process. If the wound was closed with skin glue, you do not need a bandage.   For the first 1 or 2 days keep the area propped up higher than your heart. This will help lessen your pain and any swelling.   Protect the wound from repeat injury until the skin has had time to heal.   Protect the wound from a lot of exposure to sunlight or tanning lamps while skin glue is in place. Wounds exposed to the sun can become red, while scars that have not been exposed to the sun usually turn white after a period of time.   Do not scratch, rub, or pick at your stitches, staples, or skin glue. This may cause them to loosen before the wound is healed.   Avoid activities that will make you sweat a lot until the skin glue has naturally fallen off or the stitches or staples have been removed.   Any wound can become infected. When you are cleaning your wound, look for these signs of infection:   increased redness   red streaks   increased swelling   increased pain or tenderness   pus or other drainage   warmth in the area of the wound   fever.   Contact your provider if you see any signs of infection.   If your wound was accidental, be sure to ask if a tetanus booster is needed. Treatment of accidental wounds may include taking an oral antibiotic to help prevent infection. Be sure to take the medicine until it is completely gone. Do not stop taking it just because the wound looks like it is healing well.   When are stitches, staples, or other types of wound closures removed?   Steri-Strips are usually left on until they fall off. If they have not fallen " off after 2 weeks, they should be removed. Skin glue usually falls off on its own in 5 to 10 days.   For deep cuts the first stitches are placed under the skin. These stitches are made of materials that dissolve and do not need to be removed. Sutures or staples on the surface of the skin need to be removed by your healthcare provider 5 to 14 days after they are put in. The length of time depends on where the cut is. Sutures in wounds on the face usually can be removed after 5 to 7 days. In areas of high stress, such as hands, knees, or elbows, the sutures must stay in 10 to 14 days. Your provider will tell you when you should come to the office for removal of your sutures or staples. Do NOT remove sutures or staples yourself unless your provider instructs you to do so. Staples are removed using a special tool. If you don't have the tool, don't try to remove the staples.   When should I call my healthcare provider?   Some swelling, redness, and pain are common with all wounds and normally go away as the wound heals.   Call your provider right away if:   You start to have any signs or symptoms of infection. These include:   Your skin is redder or more painful.   You have red streaks from the wound going toward your heart.   The wound area is very warm to touch.   You have pus or other fluid coming from the wound area.   You have a fever higher than 101.5? F (38.6? C).   You have chills, nausea, vomiting, or muscle aches.   The wound seems to be opening up or you notice any drainage.   The wound bleeds for more than 10 minutes.   The stitches or staples are loose.   The skin glue is loosening before it is supposed to.   You have any symptoms that worry you.     Published by [employer].  This content is reviewed periodically and is subject to change as new health information becomes available. The information is intended to inform and educate and is not a replacement for medical evaluation, advice, diagnosis or  treatment by a healthcare professional.   Written by Quirino Mcnally MD.   ? 2010 [facility] and/or its affiliates. All Rights Reserved.   Copyright   [employer] 2011                Follow-ups after your visit        Your next 10 appointments already scheduled     Jul 21, 2017 11:00 AM CDT   New Visit with Isabelle Villanueva MD   [facility]    [address] [place] [id number]   [phone]            Aug 03, 2017 10:45 AM CDT   Office Visit with Aura Rosario PA-C   [facility] ([facility])    [address]  [facility] [id number] [phone]           Bring a current list of meds and any records pertaining to this visit.  For Physicals, please bring immunization records and any forms needing to be filled out.  Please arrive 10 minutes early to complete paperwork.              Who to contact     If you have questions or need follow up information about today's clinic visit or your schedule please contact [facility] directly at [phone].  Normal or non-critical lab and imaging results will be communicated to you by [employer], letter or phone within 4 business days after the clinic has received the results. If you do not hear from us within 7 days, please contact the clinic through [employer] or phone. If you have a critical or abnormal lab result, we will notify you by phone as soon as possible.  Submit refill requests through [employer] or call your pharmacy and they will forward the refill request to us. Please allow 3 business days for your refill to be completed.          Additional Information About Your Visit        MyChart Information     [employer] gives you secure access to your electronic health record. If you see a primary care provider, you can also send messages to your care team and make appointments. If you have questions, please call your primary  care clinic.  If you do not have a primary care provider, please call [phone] and they will assist you.        Care EveryWhere ID     This is your Care EveryWhere ID. This could be used by other organizations to access your [facility] medical records  [id number]        Your Vitals Were     Pulse Temperature Respirations Pulse Oximetry BMI (Body Mass Index)       95 98.3  F (36.8  C) (Oral) 12 100% 27.44 kg/m2        Blood Pressure from Last 3 Encounters:   07/17/17 118/82   07/14/17 108/78   07/06/17 122/84    Weight from Last 3 Encounters:   07/17/17 150 lb (68 kg)   07/14/17 155 lb (70.3 kg)   07/06/17 153 lb (69.4 kg)              We Performed the Following     BIOPSY SKIN/SUBQ/MUC MEM, EACH ADDTL LESION     BIOPSY SKIN/SUBQ/MUC MEM, EACH ADDTL LESION     BIOPSY SKIN/SUBQ/MUC MEM, SINGLE LESION     Surgical pathology exam        Primary Care Provider Office Phone # Fax #    Aura EMILY Rosario PA-C [phone]       [facility] [address]  [facility] [address]        Equal Access to Services     VIKY VILLANUEVA AH: Hadii pilo bishop hadasho Soomaali, waaxda luqadaha, qaybta kaalmada adeegyada, susan turner. So [facility] [phone].    ATENCIÓN: Si habla español, tiene a kerr disposición servicios gratuitos de asistencia lingüística. [facility] [phone].    We comply with applicable federal civil rights laws and [place] laws. We do not discriminate on the basis of race, color, national origin, age, disability sex, sexual orientation or gender identity.            Thank you!     Thank you for choosing [facility]  for your care. Our goal is always to provide you with excellent care. Hearing back from our patients is one way we can continue to improve our services. Please take a few minutes to complete the written survey that you may receive in the mail after your visit with us. Thank you!             Your Updated Medication List  - Protect others around you: Learn how to safely use, store and throw away your medicines at www.disposemymeds.org.          This list is accurate as of: 7/17/17  9:47 AM.  Always use your most recent med list.                   Brand Name Dispense Instructions for use Diagnosis    ARIPiprazole 2 MG tablet    ABILIFY    90 tablet    Take 1 tablet (2 mg) by mouth At Bedtime    Adjustment disorder with mixed anxiety and depressed mood, Anxiety       cetirizine 10 MG tablet    zyrTEC     Take 10 mg by mouth daily        cyclobenzaprine 10 MG tablet    FLEXERIL    60 tablet    Take 1 tablet (10 mg) by mouth 2 times daily as needed for muscle spasms or other (back pain)    Chronic bilateral low back pain with left-sided sciatica       dexlansoprazole 60 MG Cpdr CR capsule    DEXILANT    90 capsule    Take 1 capsule (60 mg) by mouth daily    Gastroesophageal reflux disease without esophagitis       diazepam 5 MG tablet    VALIUM    20 tablet    Take 0.5-1 tablets (2.5-5 mg) by mouth every 6 hours as needed for anxiety or sleep (MUSCLE SPASM)    Chronic pain syndrome, Chronic bilateral low back pain with left-sided sciatica, Lumbar disc disease with radiculopathy, S/P lumbar fusion       dicyclomine 20 MG tablet    BENTYL    30 tablet    Take 1 tablet (20 mg) by mouth 4 times daily as needed (ABDOMINAL CRAMPING) AS NEEDED FOR CRAMPING    Hx of Crohn's disease       DULoxetine 60 MG EC capsule    CYMBALTA    90 capsule    Take 1 capsule (60 mg) by mouth daily    Adjustment disorder with mixed anxiety and depressed mood       EPINEPHrine 0.3 MG/0.3ML injection 2-pack    EPIPEN/ADRENACLICK/or ANY BX GENERIC EQUIV    0.6 mL    Inject 0.3 mLs (0.3 mg) into the muscle once as needed for anaphylaxis    Bee sting allergy       FLEX-A-MIN JOINT FLEX PO      Take by mouth daily Reported on 3/28/2017        fluticasone 50 MCG/ACT spray    FLONASE    1 Bottle    Spray 2 sprays into both nostrils daily    Seasonal allergic rhinitis due  to pollen       gabapentin 300 MG capsule    NEURONTIN    270 capsule    Take 1 capsule (300 mg) by mouth 3 times daily    Chronic bilateral low back pain with left-sided sciatica       HYDROmorphone 2 MG tablet   Start taking on:  7/20/2017    DILAUDID    56 tablet    Take 1 tablet (2 mg) by mouth every 4 hours as needed for severe pain (Max 4/day)    Abdominal pain, epigastric       lidocaine 5 % Patch    LIDODERM    30 patch    Apply up to 3 patches to painful area at once for up to 12 h within a 24 h period.  Remove after 12 hours.    Lumbar disc disease with radiculopathy       meclizine 12.5 MG tablet    ANTIVERT    30 tablet    Take 1 tablet (12.5 mg) by mouth 4 times daily as needed for dizziness    Dizziness       medical cannabis inhalation (Patient's own supply.  Not a prescription)      Inhale into the lungs 3 times daily as needed Reported on 3/28/2017        Multi-vitamin Tabs tablet   Generic drug:  multivitamin, therapeutic with minerals      Take 1 tablet by mouth daily Reported on 3/28/2017        ondansetron 4 MG ODT tab    ZOFRAN ODT    30 tablet    Take 1-2 tablets (4-8 mg) by mouth every 6 hours as needed for nausea Take 1-2 tablets by mouth as needed for nausea    Nausea       oxyCODONE 5 MG IR tablet    ROXICODONE    56 tablet    Take 1 tablet (5 mg) by mouth every 6 hours as needed for pain maximum 4 tablet(s) per day    S/P lumbar fusion, Chronic pain syndrome       pantoprazole 40 MG EC tablet    PROTONIX    90 tablet    Take 1 tablet (40 mg) by mouth daily Take 30-60 minutes before a meal.    Gastroesophageal reflux disease without esophagitis       promethazine 25 MG tablet    PHENERGAN    60 tablet    Take 1 tablet (25 mg) by mouth every 6 hours as needed for nausea    Hx of Crohn's disease       rizatriptan 5 MG tablet    MAXALT    18 tablet    Take 1-2 tablets (5-10 mg) by mouth at onset of headache for migraine May repeat in 2 hours. Max 6 tablets/24 hours.    Migraine with aura and  without status migrainosus, not intractable       senna-docusate 8.6-50 MG per tablet    SENOKOT-S;PERICOLACE    60 tablet    Take 1 tablet by mouth 2 times daily as needed for constipation    Drug-induced constipation       sucralfate 1 GM/10ML suspension    CARAFATE    420 mL    Take 10 mLs (1 g) by mouth 4 times daily (before meals and nightly)    Abdominal pain, epigastric, Hematemesis with nausea

## 2017-07-18 ENCOUNTER — PRE VISIT (OUTPATIENT)
Dept: NEUROLOGY | Facility: CLINIC | Age: 27
End: 2017-07-18

## 2017-07-18 NOTE — TELEPHONE ENCOUNTER
"Attempted to contact pt for Previsit information.  No answer.  Message left via  to have pt return Previsit call to [facility] ([phone]) appt 7/21/17.    Shira Lopez LPN      PATIENT INFORMATION    1.  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  Referring provider and clinic name:  Aura Rosario PA-C  3.  Provider managing care now (PCP and clinic name)?  Aura Rosario PA-C  4.  Records, Imaging, and Labs (see below) ?    All NOTES from previous office visits that pertain to why they are seeing  Mary    Have they ever had an EEG? If so, we need the reports.  EEG done 12/19/2016 [facility]- scanned to chart    MRI's, CT's, - need all images (on CD) and reports that pertain to why they are being seen (brain, cervical, thoracic, lumbar....ect)     All LABS that pertain to why you are seeing Dr Hernandez    Are they on any antiepileptic medications? If so, do they have their blood levels checked periodically? We need the latest lab results.          5.  [facility] Epilepsy New Patient Questionnaire received? Complete and hand carry to appointment.  ?  Yes: \"please make sure that you bring this to your appointment completed, either the doctor will not see you until this completed or you may be asked to reschedule your appointment.\"   No: mail or email to the pt and explain, \"please make sure that you bring this to your appointment completed, either the doctor will not see you until this completed or you may be asked to reschedule your appointment.\"     ~If pt coming early to fill packet out, ask that they come 30 minutes prior to their appointment~    6. Have the medication list been updated and the preferred pharmacy added? ?    7. Has their allergy list been reviewed? ?     \"Thank you for choosing [facility] and we look forward to seeing you at your upcoming appointment\"   "

## 2017-07-19 LAB — COPATH REPORT: NORMAL

## 2017-07-20 NOTE — PROGRESS NOTES
Savana Burns    Your pathology results were pre-cancerous, so good thing we took them out. The pathologist recommends that we watch them and if they should grow back then we should take a bigger chunk of skin out.      The results are attached for your review.       Zach Rosario PA-C

## 2017-07-21 ENCOUNTER — OFFICE VISIT (OUTPATIENT)
Dept: NEUROLOGY | Facility: CLINIC | Age: 27
End: 2017-07-21
Attending: PHYSICIAN ASSISTANT
Payer: MEDICARE

## 2017-07-21 VITALS — HEART RATE: 93 BPM | OXYGEN SATURATION: 100 % | SYSTOLIC BLOOD PRESSURE: 110 MMHG | DIASTOLIC BLOOD PRESSURE: 76 MMHG

## 2017-07-21 DIAGNOSIS — F44.5 PSYCHOGENIC NONEPILEPTIC SEIZURE: Primary | ICD-10-CM

## 2017-07-21 PROCEDURE — 99203 OFFICE O/P NEW LOW 30 MIN: CPT | Performed by: PSYCHIATRY & NEUROLOGY

## 2017-07-21 NOTE — MR AVS SNAPSHOT
After Visit Summary   7/21/2017    Katy Islas    MRN: [id number]           Patient Information     Date Of Birth          1990        Visit Information        Provider Department      7/21/2017 11:00 AM Isabelle Villanueva MD; [facility]         Follow-ups after your visit        Your next 10 appointments already scheduled     Jul 27, 2017  3:00 PM CDT   Nurse Only with NL RN TEAM A, MAXIMUS   [facility]    [address] [place] [id number]   [phone]            Jul 27, 2017  6:15 PM CDT   MyChart Skin Evaluation with Aura Rosario PA-C   [facility]    [address] [place] [id number]   [phone]            Aug 03, 2017 10:45 AM CDT   Office Visit with Aura Rosario PA-C   [facility]    [address] [place] [id number]   [phone]           Bring a current list of meds and any records pertaining to this visit.  For Physicals, please bring immunization records and any forms needing to be filled out.  Please arrive 10 minutes early to complete paperwork.              Who to contact     If you have questions or need follow up information about today's clinic visit or your schedule please contact [facility] directly at [phone].  Normal or non-critical lab and imaging results will be communicated to you by MyChart, letter or phone within 4 business days after the clinic has received the results. If you do not hear from us within 7 days, please contact the clinic through [employer] or phone. If you have a critical or abnormal lab result, we will notify you by phone as soon as possible.  Submit refill requests through [employer] or call your pharmacy and they will forward the refill request to us. Please allow 3 business days for your  refill to be completed.          Additional Information About Your Visit        IP Stre[employer] Information     [employer] gives you secure access to your electronic health record. If you see a primary care provider, you can also send messages to your care team and make appointments. If you have questions, please call your primary care clinic.  If you do not have a primary care provider, please call [phone] and they will assist you.      [employer] is an electronic gateway that provides easy, online access to your medical records. With [employer], you can request a clinic appointment, read your test results, renew a prescription or communicate with your care team.     To access your existing account, please contact your [facility] or call [phone] for assistance.        Care EveryWhere ID     This is your Care EveryWhere ID. This could be used by other organizations to access your [place] medical records  [id number]        Your Vitals Were     Pulse Pulse Oximetry                93 100%           Blood Pressure from Last 3 Encounters:   07/21/17 110/76   07/17/17 118/82   07/14/17 108/78    Weight from Last 3 Encounters:   07/17/17 68 kg (150 lb)   07/14/17 70.3 kg (155 lb)   07/06/17 69.4 kg (153 lb)              Today, you had the following     No orders found for display       Primary Care Provider Office Phone # Fax #    Aura DIAZ LOVE Rosario [phone]       [facility] [address]  [facility] [address]        Equal Access to Services     VIKY VILLANUEVA : Hadii aad ku hadasho Soomaali, waaxda luqadaha, qaybta kaalmada adeegyada, waxfernando yoselin santo . So [facility] [phone].    ATENCIÓN: Si habla español, tiene a kerr disposición servicios gratuitos de asistencia lingüística. Llame al [phone].    We comply with applicable federal civil rights laws and [place] laws. We do not discriminate on the basis of race,  color, national origin, age, disability sex, sexual orientation or gender identity.            Thank you!     Thank you for choosing [facility]  for your care. Our goal is always to provide you with excellent care. Hearing back from our patients is one way we can continue to improve our services. Please take a few minutes to complete the written survey that you may receive in the mail after your visit with us. Thank you!             Your Updated Medication List - Protect others around you: Learn how to safely use, store and throw away your medicines at www.disposemymeds.org.          This list is accurate as of: 7/21/17 11:42 AM.  Always use your most recent med list.                   Brand Name Dispense Instructions for use Diagnosis    ARIPiprazole 2 MG tablet    ABILIFY    90 tablet    Take 1 tablet (2 mg) by mouth At Bedtime    Adjustment disorder with mixed anxiety and depressed mood, Anxiety       cetirizine 10 MG tablet    zyrTEC     Take 10 mg by mouth daily        cyclobenzaprine 10 MG tablet    FLEXERIL    60 tablet    Take 1 tablet (10 mg) by mouth 2 times daily as needed for muscle spasms or other (back pain)    Chronic bilateral low back pain with left-sided sciatica       dexlansoprazole 60 MG Cpdr CR capsule    DEXILANT    90 capsule    Take 1 capsule (60 mg) by mouth daily    Gastroesophageal reflux disease without esophagitis       diazepam 5 MG tablet    VALIUM    20 tablet    Take 0.5-1 tablets (2.5-5 mg) by mouth every 6 hours as needed for anxiety or sleep (MUSCLE SPASM)    Chronic pain syndrome, Chronic bilateral low back pain with left-sided sciatica, Lumbar disc disease with radiculopathy, S/P lumbar fusion       dicyclomine 20 MG tablet    BENTYL    30 tablet    Take 1 tablet (20 mg) by mouth 4 times daily as needed (ABDOMINAL CRAMPING) AS NEEDED FOR CRAMPING    Hx of Crohn's disease       DULoxetine 60 MG EC capsule    CYMBALTA    90 capsule    Take 1 capsule (60 mg) by  mouth daily    Adjustment disorder with mixed anxiety and depressed mood       EPINEPHrine 0.3 MG/0.3ML injection 2-pack    EPIPEN/ADRENACLICK/or ANY BX GENERIC EQUIV    0.6 mL    Inject 0.3 mLs (0.3 mg) into the muscle once as needed for anaphylaxis    Bee sting allergy       FLEX-A-MIN JOINT FLEX PO      Take by mouth daily Reported on 3/28/2017        fluticasone 50 MCG/ACT spray    FLONASE    1 Bottle    Spray 2 sprays into both nostrils daily    Seasonal allergic rhinitis due to pollen       gabapentin 300 MG capsule    NEURONTIN    270 capsule    Take 1 capsule (300 mg) by mouth 3 times daily    Chronic bilateral low back pain with left-sided sciatica       lidocaine 5 % Patch    LIDODERM    30 patch    Apply up to 3 patches to painful area at once for up to 12 h within a 24 h period.  Remove after 12 hours.    Lumbar disc disease with radiculopathy       meclizine 12.5 MG tablet    ANTIVERT    30 tablet    Take 1 tablet (12.5 mg) by mouth 4 times daily as needed for dizziness    Dizziness       medical cannabis inhalation (Patient's own supply.  Not a prescription)      Inhale into the lungs 3 times daily as needed Reported on 3/28/2017        Multi-vitamin Tabs tablet   Generic drug:  multivitamin, therapeutic with minerals      Take 1 tablet by mouth daily Reported on 3/28/2017        ondansetron 4 MG ODT tab    ZOFRAN ODT    30 tablet    Take 1-2 tablets (4-8 mg) by mouth every 6 hours as needed for nausea Take 1-2 tablets by mouth as needed for nausea    Nausea       oxyCODONE 5 MG IR tablet    ROXICODONE    56 tablet    Take 1 tablet (5 mg) by mouth every 6 hours as needed for pain maximum 4 tablet(s) per day    S/P lumbar fusion, Chronic pain syndrome       pantoprazole 40 MG EC tablet    PROTONIX    90 tablet    Take 1 tablet (40 mg) by mouth daily Take 30-60 minutes before a meal.    Gastroesophageal reflux disease without esophagitis       promethazine 25 MG tablet    PHENERGAN    60 tablet    Take 1  tablet (25 mg) by mouth every 6 hours as needed for nausea    Hx of Crohn's disease       rizatriptan 5 MG tablet    MAXALT    18 tablet    Take 1-2 tablets (5-10 mg) by mouth at onset of headache for migraine May repeat in 2 hours. Max 6 tablets/24 hours.    Migraine with aura and without status migrainosus, not intractable       senna-docusate 8.6-50 MG per tablet    SENOKOT-S;PERICOLACE    60 tablet    Take 1 tablet by mouth 2 times daily as needed for constipation    Drug-induced constipation       sucralfate 1 GM/10ML suspension    CARAFATE    420 mL    Take 10 mLs (1 g) by mouth 4 times daily (before meals and nightly)    Abdominal pain, epigastric, Hematemesis with nausea

## 2017-07-21 NOTE — PROGRESS NOTES
SUBJECTIVE:                                                    Katy Islas is a 27 year old female who presents to clinic today for the following health issues:    HPI      Chronic Pain Follow-Up       Type / Location of Pain: back/tail bone  Analgesia/pain control:       Recent changes:  Same, tailbone is worse      Overall control: Inadequate pain control  Activity level/function:      Daily activities:  Can do most things most days, with some rest    Work:  not applicable  Adverse effects:  No  Adherance    Taking medication as directed?  Yes    Participating in other treatments: yes  Risk Factors:    Sleep:  Good    Mood/anxiety:  worsened    Recent family or social stressors:  recent move    Other aggravating factors: prolonged sitting and and standing     - Oxycodone 4/day  - IOnn=ejction in daitail mbone on MOnday   - Yes, 2 more injections with surgeon, get them at [facility] then see surgeon   -     PHQ-9 SCORE 5/16/2017 5/25/2017 6/23/2017   Total Score - - -   Total Score MyChart - 4 (Minimal depression) 4 (Minimal depression)   Total Score 7 4 4     JULIETH-7 SCORE 5/16/2017 5/25/2017 6/23/2017   Total Score - - -   Total Score - 0 (minimal anxiety) 4 (minimal anxiety)   Total Score 15 15 4     Encounter-Level CSA - 04/25/2017:          Controlled Substance Agreement - Scan on 5/1/2017 11:06 AM : CONTROLLED SUBSTANCE AGREEMENT (below)            Encounter-Level CSA - 04/25/2017:          Controlled Substance Agreement - Scan on 1/26/2015  9:14 AM : Controlled Medication Agreement-01/12/15 (below)                    Concern - Skin lesion   Onset: years    Description:   Mole in vaginal area    Intensity: mild    Progression of Symptoms:  same    Accompanying Signs & Symptoms:  Growing in size, about the size of a quater    Previous history of similar problem:   Other moles came back precancerous     Precipitating factors:   Worsened by: none    Alleviating factors:  Improved by: none    Therapies Tried and  outcome: none      - Patient also here for suture removal          Problem list and histories reviewed & adjusted, as indicated.  Additional history: as documented    ROS:  Constitutional, HEENT, cardiovascular, pulmonary, gi and gu systems are negative, except as otherwise noted.      OBJECTIVE:   /70 (BP Location: Left arm, Patient Position: Chair, Cuff Size: Adult Regular)  Pulse 116  Temp 98.9  F (37.2  C) (Oral)  Resp 16  Wt 149 lb (67.6 kg)  LMP 07/22/2017  SpO2 99%  BMI 27.25 kg/m2  Body mass index is 27.25 kg/(m^2).  GENERAL APPEARANCE: healthy, alert and no distress  EYES: Eyes grossly normal to inspection, PERRLA, conjunctivae and sclerae without injection or discharge, EOM intact   MS: No musculoskeletal defects are noted and gait is age appropriate without ataxia   SKIN: 1.5 cm x 1.5 cm [place], light tan with dark brown freckles, flat, normal borders, located on pubic symphysis,  3 lesions removed on back - sutures were removed, appear well healing with no signs of infection, no other suspicious lesions or rashes, hydration status appears adeuqate with normal skin turgor   PSYCH: Alert and oriented x3; speech- coherent , normal rate and volume; able to articulate logical thoughts, able to abstract reason, no tangential thoughts, no hallucinations or delusions, mentation appears normal, Mood is euthymic. Affect is appropriate for this mood state and bright. Thought content is free of suicidal ideation, hallucinations, and delusions. Dress is adequate and upkept. Eye contact is good during conversation.       Diagnostic Test Results:  none     ASSESSMENT/PLAN:       ICD-10-CM    1. Atypical mole D22.9    2. S/P lumbar fusion Z98.1 oxyCODONE (ROXICODONE) 5 MG IR tablet     diazepam (VALIUM) 5 MG tablet   3. Chronic pain syndrome G89.4 oxyCODONE (ROXICODONE) 5 MG IR tablet     diazepam (VALIUM) 5 MG tablet   4. Chronic bilateral low back pain with left-sided sciatica M54.42 diazepam (VALIUM) 5  MG tablet    G89.29    5. Lumbar disc disease with radiculopathy M51.16 diazepam (VALIUM) 5 MG tablet   6. Adjustment disorder with mixed anxiety and depressed mood F43.23 ARIPiprazole (ABILIFY) 5 MG tablet     DISCONTINUED: ARIPiprazole (ABILIFY) 2 MG tablet   7. Anxiety F41.9 ARIPiprazole (ABILIFY) 5 MG tablet     DISCONTINUED: ARIPiprazole (ABILIFY) 2 MG tablet     1. Moles  - Sutures removed without any issue from back x3   - Assured patient that spot on pubic bone looks like a birth elliot, continue to monitor     2-5. Pain   - stable on Oxycodone 5 mg, 4/day  - S/p fusion 2/24/17, now with tailbone issues, continues to follow with Dr. Millan who has ordered injections   - Refilled pain medication, reviewed use and side effects  - Refilled Valium that patient only uses at night, reviewed use and side effects  - Recheck every 2 weeks      Oxycodone 5 mg, 4/day, MME 30   -  reviewed no concerns   - CSA 4/25/17, Utox 6/19/17 had small amount of THC, patient states stopped   - Continue with exercise   - Continue with plan for 2 more injections     6 & 7.   - Improved on Abilify but feels there is room for more improvement   - Increase from 4 mg to 5 mg, reviewed use and side effects  - Recheck in 2 weeks       The patient indicates understanding of these issues and agrees with the plan.    Follow up: 2 weeks     Due to language barrier, an  was present during the history-taking and subsequent discussion (and for part of the physical exam) with this patient.      Aura Rosario PA-C  [facility] [place] [facility]

## 2017-07-21 NOTE — NURSING NOTE
"Katy Islas's goals for this visit include: Seizures  She requests these members of her care team be copied on today's visit information: yes    PCP: Aura Rosraio    Referring Provider:  Aura Rosario PA-C  [address]  [place] [address]    Chief Complaint   Patient presents with     Consult       Initial /76 (BP Location: Left arm, Patient Position: Chair, Cuff Size: Adult Regular)  Pulse 93  SpO2 100% Estimated body mass index is 27.44 kg/(m^2) as calculated from the following:    Height as of 7/1/17: 1.575 m (5' 2\").    Weight as of 7/17/17: 68 kg (150 lb).  Medication Reconciliation: complete    Do you need any medication refills at today's visit? no    "

## 2017-07-22 ENCOUNTER — HOSPITAL ENCOUNTER (EMERGENCY)
Facility: CLINIC | Age: 27
Discharge: HOME | End: 2017-07-22
Attending: EMERGENCY MEDICINE | Admitting: EMERGENCY MEDICINE
Payer: MEDICARE

## 2017-07-22 VITALS
SYSTOLIC BLOOD PRESSURE: 99 MMHG | HEART RATE: 67 BPM | DIASTOLIC BLOOD PRESSURE: 61 MMHG | RESPIRATION RATE: 14 BRPM | WEIGHT: 150 LBS | BODY MASS INDEX: 27.44 KG/M2 | OXYGEN SATURATION: 95 % | TEMPERATURE: 97.8 F

## 2017-07-22 DIAGNOSIS — K50.90 CROHN'S DISEASE WITHOUT COMPLICATION, UNSPECIFIED GASTROINTESTINAL TRACT LOCATION (H): ICD-10-CM

## 2017-07-22 DIAGNOSIS — R10.9 ABDOMINAL PAIN IN FEMALE: ICD-10-CM

## 2017-07-22 LAB
ALBUMIN SERPL-MCNC: 3.7 G/DL (ref 3.4–5)
ALP SERPL-CCNC: 84 U/L (ref 40–150)
ALT SERPL W P-5'-P-CCNC: 23 U/L (ref 0–50)
ANION GAP SERPL CALCULATED.3IONS-SCNC: 7 MMOL/L (ref 3–14)
AST SERPL W P-5'-P-CCNC: 12 U/L (ref 0–45)
BASOPHILS # BLD AUTO: 0 10E9/L (ref 0–0.2)
BASOPHILS NFR BLD AUTO: 0.4 %
BILIRUB SERPL-MCNC: 0.3 MG/DL (ref 0.2–1.3)
BUN SERPL-MCNC: 13 MG/DL (ref 7–30)
CALCIUM SERPL-MCNC: 8.8 MG/DL (ref 8.5–10.1)
CHLORIDE SERPL-SCNC: 107 MMOL/L (ref 94–109)
CO2 SERPL-SCNC: 28 MMOL/L (ref 20–32)
CREAT SERPL-MCNC: 0.69 MG/DL (ref 0.52–1.04)
DIFFERENTIAL METHOD BLD: NORMAL
EOSINOPHIL # BLD AUTO: 0 10E9/L (ref 0–0.7)
EOSINOPHIL NFR BLD AUTO: 0.2 %
ERYTHROCYTE [DISTWIDTH] IN BLOOD BY AUTOMATED COUNT: 11.7 % (ref 10–15)
GFR SERPL CREATININE-BSD FRML MDRD: NORMAL ML/MIN/1.7M2
GLUCOSE SERPL-MCNC: 93 MG/DL (ref 70–99)
HCT VFR BLD AUTO: 39.7 % (ref 35–47)
HGB BLD-MCNC: 13.8 G/DL (ref 11.7–15.7)
IMM GRANULOCYTES # BLD: 0 10E9/L (ref 0–0.4)
IMM GRANULOCYTES NFR BLD: 0.4 %
LIPASE SERPL-CCNC: 168 U/L (ref 73–393)
LYMPHOCYTES # BLD AUTO: 1.1 10E9/L (ref 0.8–5.3)
LYMPHOCYTES NFR BLD AUTO: 23.8 %
MCH RBC QN AUTO: 32.9 PG (ref 26.5–33)
MCHC RBC AUTO-ENTMCNC: 34.8 G/DL (ref 31.5–36.5)
MCV RBC AUTO: 95 FL (ref 78–100)
MONOCYTES # BLD AUTO: 0.3 10E9/L (ref 0–1.3)
MONOCYTES NFR BLD AUTO: 7.1 %
NEUTROPHILS # BLD AUTO: 3.2 10E9/L (ref 1.6–8.3)
NEUTROPHILS NFR BLD AUTO: 68.1 %
PLATELET # BLD AUTO: 298 10E9/L (ref 150–450)
POTASSIUM SERPL-SCNC: 4.4 MMOL/L (ref 3.4–5.3)
PROT SERPL-MCNC: 7.4 G/DL (ref 6.8–8.8)
RBC # BLD AUTO: 4.2 10E12/L (ref 3.8–5.2)
SODIUM SERPL-SCNC: 142 MMOL/L (ref 133–144)
WBC # BLD AUTO: 4.6 10E9/L (ref 4–11)

## 2017-07-22 PROCEDURE — 96374 THER/PROPH/DIAG INJ IV PUSH: CPT | Performed by: EMERGENCY MEDICINE

## 2017-07-22 PROCEDURE — 96361 HYDRATE IV INFUSION ADD-ON: CPT | Performed by: EMERGENCY MEDICINE

## 2017-07-22 PROCEDURE — 85025 COMPLETE CBC W/AUTO DIFF WBC: CPT | Performed by: EMERGENCY MEDICINE

## 2017-07-22 PROCEDURE — 96376 TX/PRO/DX INJ SAME DRUG ADON: CPT | Performed by: EMERGENCY MEDICINE

## 2017-07-22 PROCEDURE — 83690 ASSAY OF LIPASE: CPT | Performed by: EMERGENCY MEDICINE

## 2017-07-22 PROCEDURE — 80053 COMPREHEN METABOLIC PANEL: CPT | Performed by: EMERGENCY MEDICINE

## 2017-07-22 PROCEDURE — 99285 EMERGENCY DEPT VISIT HI MDM: CPT | Mod: 25 | Performed by: EMERGENCY MEDICINE

## 2017-07-22 PROCEDURE — 96375 TX/PRO/DX INJ NEW DRUG ADDON: CPT | Performed by: EMERGENCY MEDICINE

## 2017-07-22 PROCEDURE — 25000128 H RX IP 250 OP 636: Performed by: EMERGENCY MEDICINE

## 2017-07-22 PROCEDURE — 99285 EMERGENCY DEPT VISIT HI MDM: CPT | Mod: Z6 | Performed by: EMERGENCY MEDICINE

## 2017-07-22 RX ORDER — HYDROMORPHONE HYDROCHLORIDE 1 MG/ML
0.5 INJECTION, SOLUTION INTRAMUSCULAR; INTRAVENOUS; SUBCUTANEOUS
Status: COMPLETED | OUTPATIENT
Start: 2017-07-22 | End: 2017-07-22

## 2017-07-22 RX ORDER — OXYCODONE HYDROCHLORIDE 5 MG/1
5 TABLET ORAL EVERY 6 HOURS PRN
Qty: 12 TABLET | Refills: 0 | Status: SHIPPED | OUTPATIENT
Start: 2017-07-22 | End: 2017-07-25

## 2017-07-22 RX ORDER — DIPHENHYDRAMINE HYDROCHLORIDE 50 MG/ML
25 INJECTION INTRAMUSCULAR; INTRAVENOUS ONCE
Status: COMPLETED | OUTPATIENT
Start: 2017-07-22 | End: 2017-07-22

## 2017-07-22 RX ORDER — ONDANSETRON 2 MG/ML
4 INJECTION INTRAMUSCULAR; INTRAVENOUS EVERY 30 MIN PRN
Status: DISCONTINUED | OUTPATIENT
Start: 2017-07-22 | End: 2017-07-22

## 2017-07-22 RX ADMIN — ONDANSETRON 4 MG: 2 INJECTION INTRAMUSCULAR; INTRAVENOUS at 11:52

## 2017-07-22 RX ADMIN — HYDROMORPHONE HYDROCHLORIDE 0.5 MG: 1 INJECTION, SOLUTION INTRAMUSCULAR; INTRAVENOUS; SUBCUTANEOUS at 12:21

## 2017-07-22 RX ADMIN — HYDROMORPHONE HYDROCHLORIDE 0.5 MG: 1 INJECTION, SOLUTION INTRAMUSCULAR; INTRAVENOUS; SUBCUTANEOUS at 12:52

## 2017-07-22 RX ADMIN — HYDROMORPHONE HYDROCHLORIDE 0.5 MG: 1 INJECTION, SOLUTION INTRAMUSCULAR; INTRAVENOUS; SUBCUTANEOUS at 11:54

## 2017-07-22 RX ADMIN — SODIUM CHLORIDE 1000 ML: 9 INJECTION, SOLUTION INTRAVENOUS at 11:50

## 2017-07-22 RX ADMIN — DIPHENHYDRAMINE HYDROCHLORIDE 25 MG: 50 INJECTION, SOLUTION INTRAMUSCULAR; INTRAVENOUS at 12:47

## 2017-07-22 ASSESSMENT — ENCOUNTER SYMPTOMS
BACK PAIN: 1
BLOOD IN STOOL: 0
ABDOMINAL PAIN: 1
VOMITING: 1
NAUSEA: 1

## 2017-07-22 NOTE — ED PROVIDER NOTES
"  History     Chief Complaint   Patient presents with     Abdominal Pain     The history is provided by the patient and a parent.     Katy Islas is a 27 year old female with a history of hearing loss, asthma, crohn's disease, depression, anxiety, marijuana abuse, and other chronic pain who presents to the ED with abdominal pain. Patient states that her abdominal pain started this morning, the pain feels dull and \"like someone punched her abdomen\". Her pain is located in the center to left side. Katy reports she is currently on her period but the abdominal pain isn't related to her cramping pain. Katy reports that her period started last night and she abnormally had a period end just 2 weeks ago. Patient reports that she was taking depo up until last summer. She was taking it because it helped her crohn's disease but she stopped taking it because she wanted to have periods again. She currently has no chance of pregnancy. Patient states that she vomited minimally this morning and is still experiencing nausea. Katy isn't experiencing vaginal bleeding and didn't have any blood in her vomit. She states she is having regular bowel movements often and it doesn't have any blood in it. She is currently feeling dry. Of note, patient is deaf and used her mother to sign for her. Patient states she was diagnosed with crohn's disease in 2011.  At that time, she was on multiple immunosuppressant medications but apparently they \"triggered psoriasis\". She was also on Remicade but lost her insurance so has not been on it for a number of years.  She had a recent episode of anemia thought secondary to upper GI bleed related to NSAID use. She is concerned about anemia at this point. She has been taking her Carafate and Protonix as prescribed. She saw [facility] for an endoscopy that was unremarkable but has not seen any physician for her Crohn's for quite some time. Patient had L4/L5 fusion/discectomy done 2/17. " She has been on multiple different narcotic pain medications and currently is taking oxycodone 5 mg 4 times a day the plan for tapering off this medication.    Of note, patient is hearing impaired but declined an  and used her mother. Papers signed.    I have reviewed the Medications, Allergies, Past Medical and Surgical History, and Social History in the Epic system.    Allergies:   Allergies   Allergen Reactions     Ativan [Lorazepam] Hives     At the IV site     Baclofen      hives     Bees Hives, Swelling and Difficulty breathing     Caffeine      Contrast Dye      Hives,   Updated 5/10/2016 CT Contrast.     Diazepam      IV form makes her become aggressive and angry     Iodine Hives     Methocarbamol Swelling     Metoclopramide      Other reaction(s): Tremors  LW Reaction: shaking/sweating     Midazolam      Other reaction(s): Agitation     Monosodium Glutamate      Morphine Other (See Comments)     Difficulty with urination     Nsaids Other (See Comments)     GI BLEED x2     Other (Do Not Use) Other (See Comments)     Xanaflex- pt becomes disoriented and loses bladder control     Percocet [Oxycodone-Acetaminophen] Itching     Reglan [Metoclopramide Hcl] Other (See Comments)     shaking     Soma [Carisoprodol] Visual Disturbance     Sleep walking     Tizanidine      Topamax Other (See Comments)     Topamax [Topiramate] Nausea     Tingling  GI/Vomit     Tramadol      Severe Headache, Seizure Risk     Tylenol W/Codeine [Acetaminophen-Codeine] Nausea and Itching     Tylenol 3     Valium Other (See Comments)     Becomes aggressive and angry     Versed Other (See Comments)     Zolmitriptan      Makes face feel like its twitching     Droperidol Anxiety     Flu Virus Vaccine Rash      Arm swelling         No current facility-administered medications on file prior to encounter.   Current Outpatient Prescriptions on File Prior to Encounter:  oxyCODONE (ROXICODONE) 5 MG IR tablet Take 1 tablet (5 mg) by mouth  every 6 hours as needed for pain maximum 4 tablet(s) per day   diazepam (VALIUM) 5 MG tablet Take 0.5-1 tablets (2.5-5 mg) by mouth every 6 hours as needed for anxiety or sleep (MUSCLE SPASM)   sucralfate (CARAFATE) 1 GM/10ML suspension Take 10 mLs (1 g) by mouth 4 times daily (before meals and nightly)   cetirizine (ZYRTEC) 10 MG tablet Take 10 mg by mouth daily   dicyclomine (BENTYL) 20 MG tablet Take 1 tablet (20 mg) by mouth 4 times daily as needed (ABDOMINAL CRAMPING) AS NEEDED FOR CRAMPING   promethazine (PHENERGAN) 25 MG tablet Take 1 tablet (25 mg) by mouth every 6 hours as needed for nausea   pantoprazole (PROTONIX) 40 MG EC tablet Take 1 tablet (40 mg) by mouth daily Take 30-60 minutes before a meal.   rizatriptan (MAXALT) 5 MG tablet Take 1-2 tablets (5-10 mg) by mouth at onset of headache for migraine May repeat in 2 hours. Max 6 tablets/24 hours.   ondansetron (ZOFRAN ODT) 4 MG ODT tab Take 1-2 tablets (4-8 mg) by mouth every 6 hours as needed for nausea Take 1-2 tablets by mouth as needed for nausea   dexlansoprazole (DEXILANT) 60 MG CPDR CR capsule Take 1 capsule (60 mg) by mouth daily   gabapentin (NEURONTIN) 300 MG capsule Take 1 capsule (300 mg) by mouth 3 times daily   lidocaine (LIDODERM) 5 % Patch Apply up to 3 patches to painful area at once for up to 12 h within a 24 h period.  Remove after 12 hours.   cyclobenzaprine (FLEXERIL) 10 MG tablet Take 1 tablet (10 mg) by mouth 2 times daily as needed for muscle spasms or other (back pain)   senna-docusate (SENOKOT-S;PERICOLACE) 8.6-50 MG per tablet Take 1 tablet by mouth 2 times daily as needed for constipation   meclizine (ANTIVERT) 12.5 MG tablet Take 1 tablet (12.5 mg) by mouth 4 times daily as needed for dizziness   fluticasone (FLONASE) 50 MCG/ACT spray Spray 2 sprays into both nostrils daily   medical cannabis inhalation (Patient's own supply.  Not a prescription) Inhale into the lungs 3 times daily as needed Reported on 3/28/2017    ARIPiprazole (ABILIFY) 2 MG tablet Take 1 tablet (2 mg) by mouth At Bedtime   DULoxetine (CYMBALTA) 60 MG EC capsule Take 1 capsule (60 mg) by mouth daily   EPINEPHrine (EPIPEN) 0.3 MG/0.3ML injection Inject 0.3 mLs (0.3 mg) into the muscle once as needed for anaphylaxis   Misc Natural Products (FLEX-A-MIN JOINT FLEX PO) Take by mouth daily Reported on 3/28/2017   Multiple Vitamin (MULTI-VITAMIN) per tablet Take 1 tablet by mouth daily Reported on 3/28/2017       Patient Active Problem List   Diagnosis     Hearing loss     Allergic rhinitis     Migraine     Benign neoplasm of skin of lower limb, including hip     Dysmenorrhea     Crohn's colitis (H)     Fibromyalgia     Mild major depression (H)     Anxiety     Leg pain     Chronic pain     Palpitations     Oral thrush     Nausea and vomiting     Abdominal pain     Tension type headache     Grand mal seizure disorder (H)     Right knee pain     Health Care Home     Bipolar disorder (H)     Marijuana abuse     Adjustment disorder with mixed anxiety and depressed mood     Herpes simplex virus infection     Gastroesophageal reflux disease without esophagitis     Bee sting allergy     Mild persistent asthma without complication     Chronic bilateral low back pain with left-sided sciatica     Vitamin D deficiency     Atypical mole     Lumbar disc disease with radiculopathy     S/P lumbar fusion     Requires tele[employer] device for the deaf (TTD)     Upper GI bleed - suspected     Anemia due to blood loss, acute     Tobacco abuse     History of pseudoseizure       Past Surgical History:   Procedure Laterality Date     COLONOSCOPY  7/1/2009    [facility].     FUSION SPINE POSTERIOR MINIMALLY INVASIVE ONE LEVEL N/A 2/23/2017    Procedure: FUSION SPINE POSTERIOR MINIMALLY INVASIVE ONE LEVEL;  L4-5 Oblique Lateral Lumbar Interbody Fusion   Epidural steroid injection.   Transpedicular Bone marrow aspiration;  Surgeon: Jeniffer Eugene MD;  Location:   "OR     HC COLONOSCOPY THRU STOMA WITH BIOPSY  10/29/2003    Impression is that of normal appearing colonoscopy, without evidence of rectal bleeding.     HC COLONOSCOPY THRU STOMA, DIAGNOSTIC  10/00    normal     HC COLONOSCOPY THRU STOMA, DIAGNOSTIC  Oct 2009    Dr López- normal     HC EEG AWAKE AND SLEEP      abnormal     HC MRI BRAIN W/O CONTRAST  12/00    normal     HC REMOVAL GALLBLADDER  8/5/2009    [facility] [place].     HC UGI ENDOSCOPY DIAG W BIOPSY  11/11/09    Normal esophagus     HC UGI ENDOSCOPY, SIMPLE EXAM  7/00, 10/00    mild chronic esophagitis and duodenitis, neg H pylori     HC UGI ENDOSCOPY, SIMPLE EXAM  01/20/2005    Esophagogastroduodenoscopy, colonoscopy with biopsies.  [facility]     HC UGI ENDOSCOPY, SIMPLE EXAM  7/1/2009    [facility].      UGI ENDOSCOPY, SIMPLE EXAM  11/11/2009    attempted upper GI, pt. could not tolerate procedure:[facility]     ORTHOPEDIC SURGERY  October 19,2011    diskectomy L4-L5       Social History   Substance Use Topics     Smoking status: Current Every Day Smoker     Packs/day: 0.25     Types: Cigarettes     Smokeless tobacco: Never Used     Alcohol use 0.6 oz/week     1 Cans of beer per week      Comment: 1-2 drinks montlhly       Most Recent Immunizations   Administered Date(s) Administered     DPT 08/11/1994     HPVQuadrivalent 07/10/2009     HepB-Peds 01/10/2001     Influenza (IIV3) 11/20/2007     MMR 06/27/2001     Mantoux 03/08/2010     Meningococcal (Menactra ) 11/01/2006     OPV 08/11/1994     TD (ADULT, 7+) 06/27/2001     TDAP Vaccine (Adacel) 08/16/2011   Deferred Date(s) Deferred     Mantoux 04/20/2012       BMI: Estimated body mass index is 27.44 kg/(m^2) as calculated from the following:    Height as of 7/1/17: 1.575 m (5' 2\").    Weight as of this encounter: 68 kg (150 lb).      Review of Systems   Constitutional:        Feels dry.   Gastrointestinal: Positive for abdominal pain " (center to left sided, feels dull), nausea and vomiting (minimal without blood, occurred this morning). Negative for blood in stool.        Normal bowel movements.   Genitourinary: Negative for vaginal bleeding.   Musculoskeletal: Positive for back pain (chronic).   All other systems reviewed and are negative.      Physical Exam   BP: 136/89  Heart Rate: 112  Temp: 97.8  F (36.6  C)  Resp: 14  Weight: 68 kg (150 lb)  SpO2: 100 %  Physical Exam   Constitutional: She appears well-developed and well-nourished.   Healthy appearing young female lying in the bed   HENT:   Head: Normocephalic.   Nose: Nose normal.   Mouth/Throat: Oropharynx is clear and moist.   Eyes: Conjunctivae and EOM are normal. No scleral icterus.   Neck: Normal range of motion. Neck supple.   Cardiovascular: Normal rate, regular rhythm, normal heart sounds and intact distal pulses.    Pulmonary/Chest: Effort normal and breath sounds normal.   Abdominal: Soft. Bowel sounds are normal. She exhibits no mass. There is tenderness (Mild diffuse tenderness). There is no rebound and no guarding.   Musculoskeletal: Normal range of motion. She exhibits no edema.   Neurological: She is alert. She exhibits normal muscle tone.   Skin: Skin is warm and dry. She is not diaphoretic.   Psychiatric: She has a normal mood and affect. Her behavior is normal.   Vitals reviewed.      ED Course (with Medical Decision Making)  Pt seen and examined by me.  RN and EPIC notes reviewed.    Patient with recent GI bleed, anemia, history of Crohn's disease with abdominal pain. She states that she thinks her period triggered her Crohn's. However, she has not being closely followed for Crohn's disease. She has not had any abnormal bloody stools.   I'm going to check basic labs, give her some fluids and give her some pain medications.     Patient's labs are all completely normal.     I recommend that she follow up with gastroenterology. I also recommend she follow up with ObGyn if  she thinks that her periods cause her Crohn's to flare. I did give her an Rx for oxycodone to use an extra tablet if needed for breakthrough pain, #12. There is some reluctance on the part of pharmacy to fill this as she has had some recent oxycodone fills. I did okay the fill for now with the caveat that she needs to follow up as recommended.      Procedures        Results for orders placed or performed during the hospital encounter of 07/22/17 (from the past 24 hour(s))   CBC with platelets differential   Result Value Ref Range    WBC 4.6 4.0 - 11.0 10e9/L    RBC Count 4.20 3.8 - 5.2 10e12/L    Hemoglobin 13.8 11.7 - 15.7 g/dL    Hematocrit 39.7 35.0 - 47.0 %    MCV 95 78 - 100 fl    MCH 32.9 26.5 - 33.0 pg    MCHC 34.8 31.5 - 36.5 g/dL    RDW 11.7 10.0 - 15.0 %    Platelet Count 298 150 - 450 10e9/L    Diff Method Automated Method     % Neutrophils 68.1 %    % Lymphocytes 23.8 %    % Monocytes 7.1 %    % Eosinophils 0.2 %    % Basophils 0.4 %    % Immature Granulocytes 0.4 %    Absolute Neutrophil 3.2 1.6 - 8.3 10e9/L    Absolute Lymphocytes 1.1 0.8 - 5.3 10e9/L    Absolute Monocytes 0.3 0.0 - 1.3 10e9/L    Absolute Eosinophils 0.0 0.0 - 0.7 10e9/L    Absolute Basophils 0.0 0.0 - 0.2 10e9/L    Abs Immature Granulocytes 0.0 0 - 0.4 10e9/L   Comprehensive metabolic panel   Result Value Ref Range    Sodium 142 133 - 144 mmol/L    Potassium 4.4 3.4 - 5.3 mmol/L    Chloride 107 94 - 109 mmol/L    Carbon Dioxide 28 20 - 32 mmol/L    Anion Gap 7 3 - 14 mmol/L    Glucose 93 70 - 99 mg/dL    Urea Nitrogen 13 7 - 30 mg/dL    Creatinine 0.69 0.52 - 1.04 mg/dL    GFR Estimate >90  Non  GFR Calc   >60 mL/min/1.7m2    GFR Estimate If Black >90   GFR Calc   >60 mL/min/1.7m2    Calcium 8.8 8.5 - 10.1 mg/dL    Bilirubin Total 0.3 0.2 - 1.3 mg/dL    Albumin 3.7 3.4 - 5.0 g/dL    Protein Total 7.4 6.8 - 8.8 g/dL    Alkaline Phosphatase 84 40 - 150 U/L    ALT 23 0 - 50 U/L    AST 12 0 - 45 U/L    Lipase   Result Value Ref Range    Lipase 168 73 - 393 U/L     *Note: Due to a large number of results and/or encounters for the requested time period, some results have not been displayed. A complete set of results can be found in Results Review.       Medications   0.9% sodium chloride BOLUS (0 mLs Intravenous Stopped 7/22/17 1411)   HYDROmorphone (PF) (DILAUDID) injection 0.5 mg (0.5 mg Intravenous Given 7/22/17 1252)   diphenhydrAMINE (BENADRYL) injection 25 mg (25 mg Intravenous Given 7/22/17 1247)       Assessments & Plan     I have reviewed the findings, diagnosis, plan and need for follow up with the patient.       Discharge Medication List as of 7/22/2017  2:28 PM      START taking these medications    Details   !! oxyCODONE (ROXICODONE) 5 MG IR tablet Take 1 tablet (5 mg) by mouth every 6 hours as needed for breakthrough pain, Disp-12 tablet, R-0, Local Print       !! - Potential duplicate medications found. Please discuss with provider.          Final diagnoses:   Abdominal pain in female   Crohn's disease without complication, unspecified gastrointestinal tract location (H)     Disposition: Patient discharged home in stable condition in the care of her mom. Plan as above. Return if concerns.    This document serves as a record of services personally performed by Aminata Godoy MD. It was created on their behalf by Faraz Tong, a trained medical scribe. The creation of this record is based on the provider's personal observations and the statements of the patient. This document has been checked and approved by the attending provider.    Note: Chart documentation done in part with Dragon Voice Recognition software. Although reviewed after completion, some word and grammatical errors may remain.    7/22/2017   [facility] EMERGENCY DEPARTMENT    ** Addendum: I did receive a call from pharmacy. There was some concerns by the clinic pharmacist previously about patient's narcotics.  Apparently,  "she was supposed to bring her leftover Dilaudid to the pharmacy for witnessed destruction. However, the pharmacist was unable to witness this. In addition, she received 56 oxycodone tablets at 4 tablets per day on 7/14.  Pharmacy was unwilling to fill a new prescription at this point. If she is taking her oxycodone as prescribed, she should have extra tablets that she could use for significant pain.  I am going to refer her back to her primary care provider if she needs extra doses for her \"Crohn's pain\". She was not given a copy of the 12 tablet prescription that I wrote. **     Aminata Godoy MD  07/23/17 0043    "

## 2017-07-22 NOTE — ED AVS SNAPSHOT
[facility] Emergency Department    [address]     [place] [address]    Phone:  [phone]    Fax:  [phone]                                       Katy Islas   MRN: [id number]    Department:  [facility] Emergency Department   Date of Visit:  7/22/2017           Patient Information     Date Of Birth          1990        Your diagnoses for this visit were:     Abdominal pain in female     Crohn's disease without complication, unspecified gastrointestinal tract location (H)        You were seen by Aminata Godoy MD.      Follow-up Information     Follow up with Aura Rosario PA-C.    Specialty:  Physician Assistant - Medical    Contact information:    [facility]  [address]  [facility] [address]  [phone]          Discharge Instructions       Use extra oxycodone ONLY if needed for breakthrough pain.    Zofran for nausea.    Follow-up with OB/Gyn and Gastroenterology.    Return for concerns.    I hope you feel much better soon!!    Future Appointments        Provider Department Dept Phone Center    7/27/2017 3:00 PM [facility] IS; NL RN TEAM A [place] -[phone] [facility]    7/27/2017 6:15 PM Aura Rosario PA-C; ASL IS [place] -[phone] [facility]    8/3/2017 10:45 AM Dayana Roldan; Aura Rosario PA-C [place] [phone] [facility]      24 Hour Appointment Hotline       To make an appointment at any [facility], call [phone] ([phone]). If you don't have a family doctor or clinic, we will help you find one. [facility] are conveniently located to serve the needs of you and your family.             Review of your medicines      CONTINUE these medicines which may have CHANGED, or have new prescriptions. If we are uncertain of the size of tablets/capsules you have at home, strength may be listed as  something that might have changed.        Dose / Directions Last dose taken    * oxyCODONE 5 MG IR tablet   Commonly known as:  ROXICODONE   Dose:  5 mg   What changed:  Another medication with the same name was added. Make sure you understand how and when to take each.   Quantity:  56 tablet        Take 1 tablet (5 mg) by mouth every 6 hours as needed for pain maximum 4 tablet(s) per day   Refills:  0        * oxyCODONE 5 MG IR tablet   Commonly known as:  ROXICODONE   Dose:  5 mg   What changed:  You were already taking a medication with the same name, and this prescription was added. Make sure you understand how and when to take each.   Quantity:  12 tablet        Take 1 tablet (5 mg) by mouth every 6 hours as needed for breakthrough pain   Refills:  0        * Notice:  This list has 2 medication(s) that are the same as other medications prescribed for you. Read the directions carefully, and ask your doctor or other care provider to review them with you.      Our records show that you are taking the medicines listed below. If these are incorrect, please call your family doctor or clinic.        Dose / Directions Last dose taken    ARIPiprazole 2 MG tablet   Commonly known as:  ABILIFY   Dose:  2 mg   Quantity:  90 tablet        Take 1 tablet (2 mg) by mouth At Bedtime   Refills:  3        cetirizine 10 MG tablet   Commonly known as:  zyrTEC   Dose:  10 mg        Take 10 mg by mouth daily   Refills:  0        cyclobenzaprine 10 MG tablet   Commonly known as:  FLEXERIL   Dose:  10 mg   Quantity:  60 tablet        Take 1 tablet (10 mg) by mouth 2 times daily as needed for muscle spasms or other (back pain)   Refills:  3        dexlansoprazole 60 MG Cpdr CR capsule   Commonly known as:  DEXILANT   Dose:  60 mg   Quantity:  90 capsule        Take 1 capsule (60 mg) by mouth daily   Refills:  3        diazepam 5 MG tablet   Commonly known as:  VALIUM   Dose:  2.5-5 mg   Quantity:  20 tablet        Take 0.5-1 tablets  (2.5-5 mg) by mouth every 6 hours as needed for anxiety or sleep (MUSCLE SPASM)   Refills:  0        dicyclomine 20 MG tablet   Commonly known as:  BENTYL   Dose:  20 mg   Quantity:  30 tablet        Take 1 tablet (20 mg) by mouth 4 times daily as needed (ABDOMINAL CRAMPING) AS NEEDED FOR CRAMPING   Refills:  3        DULoxetine 60 MG EC capsule   Commonly known as:  CYMBALTA   Dose:  60 mg   Quantity:  90 capsule        Take 1 capsule (60 mg) by mouth daily   Refills:  3        EPINEPHrine 0.3 MG/0.3ML injection 2-pack   Commonly known as:  EPIPEN/ADRENACLICK/or ANY BX GENERIC EQUIV   Dose:  0.3 mg   Quantity:  0.6 mL        Inject 0.3 mLs (0.3 mg) into the muscle once as needed for anaphylaxis   Refills:  1        FLEX-A-MIN JOINT FLEX PO        Take by mouth daily Reported on 3/28/2017   Refills:  0        fluticasone 50 MCG/ACT spray   Commonly known as:  FLONASE   Dose:  2 spray   Quantity:  1 Bottle        Spray 2 sprays into both nostrils daily   Refills:  3        gabapentin 300 MG capsule   Commonly known as:  NEURONTIN   Dose:  300 mg   Quantity:  270 capsule        Take 1 capsule (300 mg) by mouth 3 times daily   Refills:  1        lidocaine 5 % Patch   Commonly known as:  LIDODERM   Quantity:  30 patch        Apply up to 3 patches to painful area at once for up to 12 h within a 24 h period.  Remove after 12 hours.   Refills:  3        meclizine 12.5 MG tablet   Commonly known as:  ANTIVERT   Dose:  12.5 mg   Quantity:  30 tablet        Take 1 tablet (12.5 mg) by mouth 4 times daily as needed for dizziness   Refills:  0        medical cannabis inhalation (Patient's own supply.  Not a prescription)        Inhale into the lungs 3 times daily as needed Reported on 3/28/2017   Refills:  0        Multi-vitamin Tabs tablet   Dose:  1 tablet   Generic drug:  multivitamin, therapeutic with minerals        Take 1 tablet by mouth daily Reported on 3/28/2017   Refills:  0        ondansetron 4 MG ODT tab   Commonly  known as:  ZOFRAN ODT   Dose:  4-8 mg   Quantity:  30 tablet        Take 1-2 tablets (4-8 mg) by mouth every 6 hours as needed for nausea Take 1-2 tablets by mouth as needed for nausea   Refills:  3        pantoprazole 40 MG EC tablet   Commonly known as:  PROTONIX   Dose:  40 mg   Quantity:  90 tablet        Take 1 tablet (40 mg) by mouth daily Take 30-60 minutes before a meal.   Refills:  3        promethazine 25 MG tablet   Commonly known as:  PHENERGAN   Dose:  25 mg   Quantity:  60 tablet        Take 1 tablet (25 mg) by mouth every 6 hours as needed for nausea   Refills:  3        rizatriptan 5 MG tablet   Commonly known as:  MAXALT   Dose:  5-10 mg   Quantity:  18 tablet        Take 1-2 tablets (5-10 mg) by mouth at onset of headache for migraine May repeat in 2 hours. Max 6 tablets/24 hours.   Refills:  1        senna-docusate 8.6-50 MG per tablet   Commonly known as:  SENOKOT-S;PERICOLACE   Dose:  1 tablet   Quantity:  60 tablet        Take 1 tablet by mouth 2 times daily as needed for constipation   Refills:  3        sucralfate 1 GM/10ML suspension   Commonly known as:  CARAFATE   Dose:  1 g   Quantity:  420 mL        Take 10 mLs (1 g) by mouth 4 times daily (before meals and nightly)   Refills:  3                Prescriptions were sent or printed at these locations (1 Prescription)                   [facility] [address]    [address]  [facility] [address]    Telephone:  [phone]   Fax:  [phone]   Hours:                  Printed at Department/Unit printer (1 of 1)         oxyCODONE (ROXICODONE) 5 MG IR tablet                Procedures and tests performed during your visit     CBC with platelets differential    Comprehensive metabolic panel    Lipase    Peripheral IV catheter      Orders Needing Specimen Collection     None      Pending Results     No orders found from 7/20/2017 to 7/23/2017.            Pending Culture Results     No orders found from  7/20/2017 to 7/23/2017.            Pending Results Instructions     If you had any lab results that were not finalized at the time of your Discharge, you can call the ED Lab Result RN at [phone]. You will be contacted by this team for any positive Lab results or changes in treatment. The nurses are available 7 days a week from 10A to 6:30P.  You can leave a message 24 hours per day and they will return your call.        Thank you for choosing [facility]       Thank you for choosing [facility] for your care. Our goal is always to provide you with excellent care. Hearing back from our patients is one way we can continue to improve our services. Please take a few minutes to complete the written survey that you may receive in the mail after you visit with us. Thank you!        [employer] Information     [employer] gives you secure access to your electronic health record. If you see a primary care provider, you can also send messages to your care team and make appointments. If you have questions, please call your primary care clinic.  If you do not have a primary care provider, please call [phone] and they will assist you.        Care EveryWhere ID     This is your Care EveryWhere ID. This could be used by other organizations to access your [facility] medical records  [id number]        Equal Access to Services     VIKY VILLANUEVA : Ruth Chen, mayank kelly, champ ragland, susan turner. So [facility] [phone].    ATENCIÓN: Si habla español, tiene a kerr disposición servicios gratuitos de asistencia lingüística. Llame al [phone].    We comply with applicable federal civil rights laws and [place] laws. We do not discriminate on the basis of race, color, national origin, age, disability sex, sexual orientation or gender identity.            After Visit Summary       This is your record. Keep this with you and show to your community pharmacist(s) and doctor(s) at  your next visit.

## 2017-07-22 NOTE — DISCHARGE INSTRUCTIONS
Use extra oxycodone ONLY if needed for breakthrough pain.    Zofran for nausea.    Follow-up with OB/Gyn and Gastroenterology.    Return for concerns.    I hope you feel much better soon!!

## 2017-07-22 NOTE — ED NOTES
Presents with abd pain that started this AM. States she currently has her period. In the past she has been on depo because of menstrual pain

## 2017-07-22 NOTE — ED NOTES
"PT w/hx of crohns disease and recent admission for GI bleed and did not need a transfusion.  She is now having similar pain, \"feels like I am being punched in the stomach.\"  Only a small amt of diarrhea right now and w/o blood.  She did have a period 2 wks ago and is bleeding again.  She was on depo and stopped it a yr ago and since has had irregular menses.  Pain is not period like pain.  Feels nauseated w/one episode of dry heave/small emesis this am.  Denies any fever/chills.  Denies any urinary sx.    Pt is deaf and mother is fluent in ASL.  Pt requests her mother to interp.  Waiver signed.   "

## 2017-07-22 NOTE — ED AVS SNAPSHOT
[facility] Emergency Department    [address] [place] [address]    Phone:  [phone]    Fax:  [phone]                                       Katy Islas   MRN: [id number]    Department:  [facility] Emergency Department   Date of Visit:  7/22/2017           After Visit Summary Signature Page     I have received my discharge instructions, and my questions have been answered. I have discussed any challenges I see with this plan with the nurse or doctor.    ..........................................................................................................................................  Patient/Patient Representative Signature      ..........................................................................................................................................  Patient Representative Print Name and Relationship to Patient    ..................................................               ................................................  Date                                            Time    ..........................................................................................................................................  Reviewed by Signature/Title    ...................................................              ..............................................  Date                                                            Time

## 2017-07-24 ENCOUNTER — MYC MEDICAL ADVICE (OUTPATIENT)
Dept: FAMILY MEDICINE | Facility: OTHER | Age: 27
End: 2017-07-24

## 2017-07-24 ENCOUNTER — TRANSFERRED RECORDS (OUTPATIENT)
Dept: HEALTH INFORMATION MANAGEMENT | Facility: CLINIC | Age: 27
End: 2017-07-24

## 2017-07-24 ENCOUNTER — TELEPHONE (OUTPATIENT)
Dept: FAMILY MEDICINE | Facility: OTHER | Age: 27
End: 2017-07-24

## 2017-07-24 NOTE — TELEPHONE ENCOUNTER
Discussed with pharmacist. Will have them destroy rx from ER as I am seeing patient in a few days for pain recheck.    Zach Rosario PA-C  [facility]

## 2017-07-24 NOTE — TELEPHONE ENCOUNTER
"Kya from [facility] calling - she states Katy was in this weekend to fill Oxycodone that she was given from her Hospital follow up. Was in the ED on 07/22. Brought Rx to pharmacy - pharmacist denied filling this medication and did not give her back the paper copy. Pharmacy states that Katy is breaking the narcotic agreement. Patient will be here for OV on 07/26.    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 Number to call Kya for CDL response is [phone].   Abimbola Castro CMA    Per hospital note: \"** Addendum: I did receive a call from pharmacy. There was some concerns by the clinic pharmacist previously about patient's narcotics.  Apparently, she was supposed to bring her leftover Dilaudid to the pharmacy for witnessed destruction. However, the pharmacist was unable to witness this. In addition, she received 56 oxycodone tablets at 4 tablets per day on 7/14.  Pharmacy was unwilling to fill a new prescription at this point. If she is taking her oxycodone as prescribed, she should have extra tablets that she could use for significant pain.  I am going to refer her back to her primary care provider if she needs extra doses for her \"Crohn's pain\". She was not given a copy of the 12 tablet prescription that I wrote. **\"  "

## 2017-07-27 ENCOUNTER — OFFICE VISIT (OUTPATIENT)
Dept: FAMILY MEDICINE | Facility: OTHER | Age: 27
End: 2017-07-27
Payer: MEDICARE

## 2017-07-27 VITALS
DIASTOLIC BLOOD PRESSURE: 70 MMHG | TEMPERATURE: 98.9 F | SYSTOLIC BLOOD PRESSURE: 118 MMHG | BODY MASS INDEX: 27.25 KG/M2 | OXYGEN SATURATION: 99 % | RESPIRATION RATE: 16 BRPM | HEART RATE: 116 BPM | WEIGHT: 149 LBS

## 2017-07-27 DIAGNOSIS — G89.4 CHRONIC PAIN SYNDROME: ICD-10-CM

## 2017-07-27 DIAGNOSIS — Z98.1 S/P LUMBAR FUSION: ICD-10-CM

## 2017-07-27 DIAGNOSIS — G89.29 CHRONIC BILATERAL LOW BACK PAIN WITH LEFT-SIDED SCIATICA: ICD-10-CM

## 2017-07-27 DIAGNOSIS — M54.42 CHRONIC BILATERAL LOW BACK PAIN WITH LEFT-SIDED SCIATICA: ICD-10-CM

## 2017-07-27 DIAGNOSIS — M51.16 LUMBAR DISC DISEASE WITH RADICULOPATHY: ICD-10-CM

## 2017-07-27 DIAGNOSIS — F43.23 ADJUSTMENT DISORDER WITH MIXED ANXIETY AND DEPRESSED MOOD: ICD-10-CM

## 2017-07-27 DIAGNOSIS — D22.9 ATYPICAL MOLE: Primary | ICD-10-CM

## 2017-07-27 DIAGNOSIS — F41.9 ANXIETY: ICD-10-CM

## 2017-07-27 PROCEDURE — 99214 OFFICE O/P EST MOD 30 MIN: CPT | Mod: 24 | Performed by: PHYSICIAN ASSISTANT

## 2017-07-27 RX ORDER — ARIPIPRAZOLE 5 MG/1
5 TABLET ORAL AT BEDTIME
Qty: 90 TABLET | Refills: 3 | Status: SHIPPED | OUTPATIENT
Start: 2017-07-27 | End: 2017-09-07

## 2017-07-27 RX ORDER — OXYCODONE HYDROCHLORIDE 5 MG/1
5 TABLET ORAL EVERY 6 HOURS PRN
Qty: 56 TABLET | Refills: 0 | Status: SHIPPED | OUTPATIENT
Start: 2017-07-27 | End: 2017-09-07

## 2017-07-27 RX ORDER — DIAZEPAM 5 MG
2.5-5 TABLET ORAL EVERY 6 HOURS PRN
Qty: 20 TABLET | Refills: 0 | Status: SHIPPED | OUTPATIENT
Start: 2017-07-27 | End: 2017-08-03

## 2017-07-27 RX ORDER — ARIPIPRAZOLE 2 MG/1
2 TABLET ORAL AT BEDTIME
Qty: 90 TABLET | Refills: 3 | Status: SHIPPED | OUTPATIENT
Start: 2017-07-27 | End: 2017-07-27

## 2017-07-27 RX ORDER — DIAZEPAM 5 MG
2.5-5 TABLET ORAL EVERY 6 HOURS PRN
Qty: 20 TABLET | Refills: 0 | Status: CANCELLED | OUTPATIENT
Start: 2017-07-27

## 2017-07-27 NOTE — MR AVS SNAPSHOT
After Visit Summary   7/27/2017    Katy Islas    MRN: [id number]           Patient Information     Date Of Birth          1990        Visit Information        Provider Department      7/27/2017 6:15 PM Aura Rosario PA-C; ASL IS [facility]        Today's Diagnoses     Atypical mole    -  1    S/P lumbar fusion        Chronic pain syndrome        Chronic bilateral low back pain with left-sided sciatica        Lumbar disc disease with radiculopathy        Adjustment disorder with mixed anxiety and depressed mood        Anxiety          Care Instructions    - recheck 2 weeks          Follow-ups after your visit        Your next 10 appointments already scheduled     Aug 03, 2017 10:45 AM CDT   Office Visit with Aura Rosario PA-C   [facility]    [address] [place] [id number] [phone]           Bring a current list of meds and any records pertaining to this visit. For Physicals, please bring immunization records and any forms needing to be filled out. Please arrive 10 minutes early to complete paperwork.              Who to contact     If you have questions or need follow up information about today's clinic visit or your schedule please contact [facility] directly at [phone].  Normal or non-critical lab and imaging results will be communicated to you by MyChart, letter or phone within 4 business days after the clinic has received the results. If you do not hear from us within 7 days, please contact the clinic through [employer] or phone. If you have a critical or abnormal lab result, we will notify you by phone as soon as possible.  Submit refill requests through [employer] or call your pharmacy and they will forward the refill request to us. Please allow 3 business days for your refill to be completed.          Additional Information About Your Visit         [employer] Information     [employer] gives you secure access to your electronic health record. If you see a primary care provider, you can also send messages to your care team and make appointments. If you have questions, please call your primary care clinic.  If you do not have a primary care provider, please call [phone] and they will assist you.        Care EveryWhere ID     This is your Care EveryWhere ID. This could be used by other organizations to access your [facility] medical records  [id number]        Your Vitals Were     Pulse Temperature Respirations Last Period Pulse Oximetry BMI (Body Mass Index)    116 98.9  F (37.2  C) (Oral) 16 07/22/2017 99% 27.25 kg/m2       Blood Pressure from Last 3 Encounters:   07/27/17 118/70   07/22/17 99/61   07/21/17 110/76    Weight from Last 3 Encounters:   07/27/17 149 lb (67.6 kg)   07/22/17 150 lb (68 kg)   07/17/17 150 lb (68 kg)              Today, you had the following     No orders found for display         Today's Medication Changes          These changes are accurate as of: 7/27/17  6:35 PM.  If you have any questions, ask your nurse or doctor.               Start taking these medicines.        Dose/Directions    ARIPiprazole 5 MG tablet   Commonly known as:  ABILIFY   Used for:  Adjustment disorder with mixed anxiety and depressed mood, Anxiety   Started by:  Aura Rosario PA-C        Dose:  5 mg   Take 1 tablet (5 mg) by mouth At Bedtime   Quantity:  90 tablet   Refills:  3            Where to get your medicines      These medications were sent to [facility] [address] [facility] [address]  [address] [facility] [address]     Phone:  [phone]     ARIPiprazole 5 MG tablet         Some of these will need a paper prescription and others can be bought over the counter.  Ask your nurse if you have questions.     Bring a paper prescription for each of these medications     diazepam 5  MG tablet    oxyCODONE 5 MG IR tablet                Primary Care Provider Office Phone # Fax #    Aura DIAZ LOVE Rosario [phone]       [address]  [facility] [address]        Equal Access to Services     VIKY VILLANUEVA : Hadii pilo ku hadasho Soomaali, waaxda luqadaha, qaybta kaalmada adeegyada, waxay calebin haygeraldon norberto turner. So [facility] [phone].    ATENCIÓN: Si habla español, tiene a kerr disposición servicios gratuitos de asistencia lingüística. [place] al [phone].    We comply with applicable federal civil rights laws and [place] laws. We do not discriminate on the basis of race, color, national origin, age, disability sex, sexual orientation or gender identity.            Thank you!     Thank you for choosing [facility]  for your care. Our goal is always to provide you with excellent care. Hearing back from our patients is one way we can continue to improve our services. Please take a few minutes to complete the written survey that you may receive in the mail after your visit with us. Thank you!             Your Updated Medication List - Protect others around you: Learn how to safely use, store and throw away your medicines at www.disposemymeds.org.          This list is accurate as of: 7/27/17  6:35 PM.  Always use your most recent med list.                   Brand Name Dispense Instructions for use Diagnosis    ARIPiprazole 5 MG tablet    ABILIFY    90 tablet    Take 1 tablet (5 mg) by mouth At Bedtime    Adjustment disorder with mixed anxiety and depressed mood, Anxiety       cetirizine 10 MG tablet    zyrTEC     Take 10 mg by mouth daily        cyclobenzaprine 10 MG tablet    FLEXERIL    60 tablet    Take 1 tablet (10 mg) by mouth 2 times daily as needed for muscle spasms or other (back pain)    Chronic bilateral low back pain with left-sided sciatica       dexlansoprazole 60 MG Cpdr CR capsule    DEXILANT    90  capsule    Take 1 capsule (60 mg) by mouth daily    Gastroesophageal reflux disease without esophagitis       diazepam 5 MG tablet    VALIUM    20 tablet    Take 0.5-1 tablets (2.5-5 mg) by mouth every 6 hours as needed for anxiety or sleep (MUSCLE SPASM)    Chronic pain syndrome, Chronic bilateral low back pain with left-sided sciatica, Lumbar disc disease with radiculopathy, S/P lumbar fusion       dicyclomine 20 MG tablet    BENTYL    30 tablet    Take 1 tablet (20 mg) by mouth 4 times daily as needed (ABDOMINAL CRAMPING) AS NEEDED FOR CRAMPING    Hx of Crohn's disease       DULoxetine 60 MG EC capsule    CYMBALTA    90 capsule    Take 1 capsule (60 mg) by mouth daily    Adjustment disorder with mixed anxiety and depressed mood       EPINEPHrine 0.3 MG/0.3ML injection 2-pack    EPIPEN/ADRENACLICK/or ANY BX GENERIC EQUIV    0.6 mL    Inject 0.3 mLs (0.3 mg) into the muscle once as needed for anaphylaxis    Bee sting allergy       FLEX-A-MIN JOINT FLEX PO      Take by mouth daily Reported on 3/28/2017        fluticasone 50 MCG/ACT spray    FLONASE    1 Bottle    Spray 2 sprays into both nostrils daily    Seasonal allergic rhinitis due to pollen       gabapentin 300 MG capsule    NEURONTIN    270 capsule    Take 1 capsule (300 mg) by mouth 3 times daily    Chronic bilateral low back pain with left-sided sciatica       lidocaine 5 % Patch    LIDODERM    30 patch    Apply up to 3 patches to painful area at once for up to 12 h within a 24 h period.  Remove after 12 hours.    Lumbar disc disease with radiculopathy       meclizine 12.5 MG tablet    ANTIVERT    30 tablet    Take 1 tablet (12.5 mg) by mouth 4 times daily as needed for dizziness    Dizziness       medical cannabis inhalation (Patient's own supply.  Not a prescription)      Inhale into the lungs 3 times daily as needed Reported on 3/28/2017        Multi-vitamin Tabs tablet   Generic drug:  multivitamin, therapeutic with minerals      Take 1 tablet by mouth  daily Reported on 3/28/2017        ondansetron 4 MG ODT tab    ZOFRAN ODT    30 tablet    Take 1-2 tablets (4-8 mg) by mouth every 6 hours as needed for nausea Take 1-2 tablets by mouth as needed for nausea    Nausea       oxyCODONE 5 MG IR tablet    ROXICODONE    56 tablet    Take 1 tablet (5 mg) by mouth every 6 hours as needed for pain maximum 4 tablet(s) per day    S/P lumbar fusion, Chronic pain syndrome       pantoprazole 40 MG EC tablet    PROTONIX    90 tablet    Take 1 tablet (40 mg) by mouth daily Take 30-60 minutes before a meal.    Gastroesophageal reflux disease without esophagitis       promethazine 25 MG tablet    PHENERGAN    60 tablet    Take 1 tablet (25 mg) by mouth every 6 hours as needed for nausea    Hx of Crohn's disease       rizatriptan 5 MG tablet    MAXALT    18 tablet    Take 1-2 tablets (5-10 mg) by mouth at onset of headache for migraine May repeat in 2 hours. Max 6 tablets/24 hours.    Migraine with aura and without status migrainosus, not intractable       senna-docusate 8.6-50 MG per tablet    SENOKOT-S;PERICOLACE    60 tablet    Take 1 tablet by mouth 2 times daily as needed for constipation    Drug-induced constipation       sucralfate 1 GM/10ML suspension    CARAFATE    420 mL    Take 10 mLs (1 g) by mouth 4 times daily (before meals and nightly)    Abdominal pain, epigastric, Hematemesis with nausea

## 2017-07-28 NOTE — PROGRESS NOTES
"  SUBJECTIVE:                                                    Katy Islas is a 27 year old female who presents to clinic today for the following health issues:      HPI    1.  Would like moles checked on back, states there is another concerning mole on her back and that the ones that were removed are \"Growing back\"  - was removed age 11 or 12, growing back, right lower leg   - Center lower back, new lesion       Pt would also like to discuss going back on birth control - depo/trinessa  - Worsening her Crohn's and migraines   - Pills cause yeast infection and forgets to take   - Depo was fine       Also pt states she had a migraine last night and it was very bad, she was throwing up from it  - Out of nausea medication  - Oxycodone making worse  - Plus frustration and stress  - Wants to go back to something stronger for pain   - Maxalt doesn't always work   - Insurance won't cover dissolving       Pt also states she needs refills on Flexeril and valium - she will be in [place] and will not have enough to get her through the trip.  - Takes 2 flexeril at night and sometimes 1 valium   - Really wants to try something different than the oxycodone, feels it is giving her headaches and making it worse with migraines   - Really wants to try Norco       Depression  - Stressed  - Mom moving to [place], sad she is leaving   - Looking at houses today  - Going to be lonely       Problem list and histories reviewed & adjusted, as indicated.  Additional history: as documented    BP Readings from Last 3 Encounters:   08/03/17 124/80   07/27/17 118/70   07/22/17 99/61    Wt Readings from Last 3 Encounters:   08/03/17 158 lb (71.7 kg)   07/27/17 149 lb (67.6 kg)   07/22/17 150 lb (68 kg)             Labs reviewed in EPIC      ROS:  Constitutional, HEENT, cardiovascular, pulmonary, gi and gu systems are negative, except as otherwise noted.      OBJECTIVE:   /80 (BP Location: Right arm, Patient Position: Chair, Cuff Size: Adult " Regular)  Pulse 105  Temp 98.6  F (37  C) (Oral)  Resp 12  Wt 158 lb (71.7 kg)  LMP 07/22/2017  SpO2 99%  BMI 28.9 kg/m2  Body mass index is 28.9 kg/(m^2).  GENERAL APPEARANCE: healthy, alert and no distress  EYES: Eyes grossly normal to inspection, PERRLA, conjunctivae and sclerae without injection or discharge, EOM intact   MS: No musculoskeletal defects are noted and gait is age appropriate without ataxia   SKIN: Right lower leg - 0.4x 0.4 oval skin lesion with dark brown center and light brown halo with irregular borders, extends around sides of previous scar, center low back - 0.5 x 0.6 oval-circular irregular shape, dark brown/purple/black color with irregular borders, No other suspicious lesions or rashes, hydration status appears adeuqate with normal skin turgor   NEURO: Deferred   BACK: Deferred, did see skin which shows surgery scars are well healing   PSYCH: Alert and oriented x3; speech- coherent , normal rate and volume; able to articulate logical thoughts, able to abstract reason, no tangential thoughts, no hallucinations or delusions, mentation appears normal, Mood is euthymic. Affect is appropriate for this mood state and bright. Thought content is free of suicidal ideation, hallucinations, and delusions. Dress is adequate and upkept. Eye contact is good during conversation.       Diagnostic Test Results:  Results for orders placed or performed in visit on 08/03/17   Beta HCG qual IFA urine   Result Value Ref Range    Beta HCG Qual IFA Urine Negative NEG     *Note: Due to a large number of results and/or encounters for the requested time period, some results have not been displayed. A complete set of results can be found in Results Review.         ASSESSMENT/PLAN:       ICD-10-CM    1. Chronic pain syndrome G89.4 diazepam (VALIUM) 5 MG tablet     HYDROcodone-acetaminophen (NORCO) 5-325 MG per tablet   2. Chronic bilateral low back pain with left-sided sciatica M54.42 diazepam (VALIUM) 5 MG  tablet    G89.29 cyclobenzaprine (FLEXERIL) 10 MG tablet     HYDROcodone-acetaminophen (NORCO) 5-325 MG per tablet   3. Lumbar disc disease with radiculopathy M51.16 diazepam (VALIUM) 5 MG tablet     HYDROcodone-acetaminophen (NORCO) 5-325 MG per tablet   4. S/P lumbar fusion Z98.1 diazepam (VALIUM) 5 MG tablet     HYDROcodone-acetaminophen (NORCO) 5-325 MG per tablet   5. Migraine with aura and without status migrainosus, not intractable G43.109 rizatriptan (MAXALT-MLT) 5 MG ODT tab   6. Nausea R11.0 ondansetron (ZOFRAN ODT) 4 MG ODT tab   7. Encounter for surveillance of injectable contraceptive Z30.42 Beta HCG qual IFA urine     medroxyPROGESTERone (DEPO-PROVERA) 150 MG/ML injection     Medroxyprogesterone inj/1mg (Depo Provera J-Code)     INJECTION INTRAMUSCULAR OR SUB-Q   8. Dysmenorrhea N94.6 medroxyPROGESTERone (DEPO-PROVERA) 150 MG/ML injection   9. Atypical mole D22.9    10. Adjustment disorder with mixed anxiety and depressed mood F43.23    11. Anxiety F41.9    12. Bipolar affective disorder, remission status unspecified (H) F31.9    13. Mild major depression (H) F32.0      1-4. Back pain   - S/p lumbar fusion on 2/24/17 and sacral injection on 7/24/17  - Continues to follow with Dr. Millan and [facility], planning 2 further injections   - Due to migraines, will STOP oxycodone and start Norco. Discussed risks of tylenol use (but not the same as NSAIDs which she should continue to avoid due to recent suspected GI bleed). Reviewed medication use and side effects  - Norco 5-325 mg, 4/day  - Discussed I recommend staying on Oxycodone and using the Gabapentin and Valium to get her off it, but she states the other two make her too sleepy all the time, she is frustrated and tired of her fatigue, grogginess, and migraines   - Recheck every 2 weeks   - Continue to follow with specialist   - Continue Gabapentin, Flexeril, and Valium as needed     5 & 6. Migraines  - Will try for Maxalt dissolving as this has helped  patient in the past   - Patient was also only using one, discussed trying 2 and also increase by taking another tablet 2 hours later as needed   - Refilled Zofran medication  - Will get her off Oxycodone and onto contraception in hopes this will also help decrease migraines   - Reviewed both medications use and side effects    7. & 8. Contraception  - Discussed risks and benefits of various forms of contraception   - Patient elects to try Depo again  - Got Ubeta, not pregnant, first injection given today    9. Mole  - Recommend due to history of moles with atypia that we remove 2 further moles   - Discussed risks and benefits of procedure including bleeding, infection, and scarring   - Recommend procedure as follows:          1) Right lower leg - 6 mm punch biopsy, with sutures and removal in 10-14 days           2) Center low back  - 6 mm punch biopsy, with sutures and removal in 10-14 days         Supplies: 1% lidocaine with epi, 2 alcohol wipes, chloraprep, biopsy blade - 6 mm punch, lac pack, Sutures: 3-0 Ethilon, sterile 4x4 pack, 2 - sterile 2x2, 2 - small tegaderm    10-13. Mood   - Discussed counseling vs. Medication adjustment vs. Observation including risks and benefits   - Patient only increased Abilify from 4 mg to 5 mg ~2-3 weeks ago, still reporting grogginess in the mornings from it  - Recommend we not increase this soon  - Patient wishes to observe, thinks its just because her mom is moving and that she just needs time to adjust to new normal  - Verbally consents to safety, denies SI     The patient indicates understanding of these issues and agrees with the plan.    Follow up: 2 weeks for mole removal and pain recheck    Due to language barrier, an  was present during the history-taking and subsequent discussion (and for part of the physical exam) with this patient.      A total of 50 minutes was spent with the patient today, with greater than 50% of the visit involving counseling and  coordination of care.        Aura Rosario PA-C  [facility]

## 2017-08-01 ENCOUNTER — MYC MEDICAL ADVICE (OUTPATIENT)
Dept: FAMILY MEDICINE | Facility: OTHER | Age: 27
End: 2017-08-01

## 2017-08-01 NOTE — TELEPHONE ENCOUNTER
Routing message to PCP as a FYI, patient has a scheduled appt.   Next 5 appointments (look out 90 days)     Aug 03, 2017 10:45 AM CDT   Office Visit with Aura Rosario PA-C, Day[facility] Roldan   [facility]    [address] [place] [id number]   [phone]            Aug 10, 2017 10:45 AM CDT   Office Visit with Aura Rosario PA-C, ASL IS   [facility]    [address] [place] [id number]   [phone]                Nohemi Jennings, RN, BSN

## 2017-08-03 ENCOUNTER — OFFICE VISIT (OUTPATIENT)
Dept: FAMILY MEDICINE | Facility: OTHER | Age: 27
End: 2017-08-03
Payer: MEDICARE

## 2017-08-03 VITALS
TEMPERATURE: 98.6 F | BODY MASS INDEX: 28.9 KG/M2 | DIASTOLIC BLOOD PRESSURE: 80 MMHG | RESPIRATION RATE: 12 BRPM | SYSTOLIC BLOOD PRESSURE: 124 MMHG | HEART RATE: 105 BPM | WEIGHT: 158 LBS | OXYGEN SATURATION: 99 %

## 2017-08-03 DIAGNOSIS — R11.0 NAUSEA: ICD-10-CM

## 2017-08-03 DIAGNOSIS — M51.16 LUMBAR DISC DISEASE WITH RADICULOPATHY: ICD-10-CM

## 2017-08-03 DIAGNOSIS — F43.23 ADJUSTMENT DISORDER WITH MIXED ANXIETY AND DEPRESSED MOOD: ICD-10-CM

## 2017-08-03 DIAGNOSIS — M54.42 CHRONIC BILATERAL LOW BACK PAIN WITH LEFT-SIDED SCIATICA: ICD-10-CM

## 2017-08-03 DIAGNOSIS — F41.9 ANXIETY: ICD-10-CM

## 2017-08-03 DIAGNOSIS — G89.4 CHRONIC PAIN SYNDROME: Primary | ICD-10-CM

## 2017-08-03 DIAGNOSIS — G89.29 CHRONIC BILATERAL LOW BACK PAIN WITH LEFT-SIDED SCIATICA: ICD-10-CM

## 2017-08-03 DIAGNOSIS — Z98.1 S/P LUMBAR FUSION: ICD-10-CM

## 2017-08-03 DIAGNOSIS — N94.6 DYSMENORRHEA: ICD-10-CM

## 2017-08-03 DIAGNOSIS — F31.9 BIPOLAR AFFECTIVE DISORDER, REMISSION STATUS UNSPECIFIED (H): ICD-10-CM

## 2017-08-03 DIAGNOSIS — G43.109 MIGRAINE WITH AURA AND WITHOUT STATUS MIGRAINOSUS, NOT INTRACTABLE: ICD-10-CM

## 2017-08-03 DIAGNOSIS — Z30.42 ENCOUNTER FOR SURVEILLANCE OF INJECTABLE CONTRACEPTIVE: ICD-10-CM

## 2017-08-03 DIAGNOSIS — F32.0 MILD MAJOR DEPRESSION (H): ICD-10-CM

## 2017-08-03 DIAGNOSIS — D22.9 ATYPICAL MOLE: ICD-10-CM

## 2017-08-03 LAB — BETA HCG QUAL IFA URINE: NEGATIVE

## 2017-08-03 PROCEDURE — 99215 OFFICE O/P EST HI 40 MIN: CPT | Mod: 25 | Performed by: PHYSICIAN ASSISTANT

## 2017-08-03 PROCEDURE — 96372 THER/PROPH/DIAG INJ SC/IM: CPT | Performed by: PHYSICIAN ASSISTANT

## 2017-08-03 PROCEDURE — 99207 ZZC NO BILLABLE SERVICE THIS VISIT: CPT | Mod: U3

## 2017-08-03 PROCEDURE — 84703 CHORIONIC GONADOTROPIN ASSAY: CPT | Performed by: PHYSICIAN ASSISTANT

## 2017-08-03 RX ORDER — RIZATRIPTAN BENZOATE 5 MG/1
5-10 TABLET, ORALLY DISINTEGRATING ORAL
Qty: 18 TABLET | Refills: 3 | Status: SHIPPED | OUTPATIENT
Start: 2017-08-03 | End: 2017-11-18

## 2017-08-03 RX ORDER — MEDROXYPROGESTERONE ACETATE 150 MG/ML
150 INJECTION, SUSPENSION INTRAMUSCULAR
Qty: 3 ML | Refills: 3 | OUTPATIENT
Start: 2017-08-03 | End: 2018-06-19

## 2017-08-03 RX ORDER — ONDANSETRON 4 MG/1
4-8 TABLET, ORALLY DISINTEGRATING ORAL EVERY 6 HOURS PRN
Qty: 30 TABLET | Refills: 3 | Status: SHIPPED | OUTPATIENT
Start: 2017-08-03 | End: 2017-08-15

## 2017-08-03 RX ORDER — DIAZEPAM 5 MG
2.5-5 TABLET ORAL EVERY 12 HOURS PRN
Qty: 60 TABLET | Refills: 3 | Status: SHIPPED | OUTPATIENT
Start: 2017-08-03 | End: 2017-09-18

## 2017-08-03 RX ORDER — HYDROCODONE BITARTRATE AND ACETAMINOPHEN 5; 325 MG/1; MG/1
1 TABLET ORAL EVERY 4 HOURS PRN
Qty: 60 TABLET | Refills: 0 | Status: SHIPPED | OUTPATIENT
Start: 2017-08-03 | End: 2017-08-15

## 2017-08-03 RX ORDER — CYCLOBENZAPRINE HCL 10 MG
20 TABLET ORAL
Qty: 60 TABLET | Refills: 3 | Status: SHIPPED | OUTPATIENT
Start: 2017-08-03 | End: 2017-09-07

## 2017-08-03 ASSESSMENT — PAIN SCALES - GENERAL: PAINLEVEL: MODERATE PAIN (4)

## 2017-08-03 NOTE — NURSING NOTE
"Chief Complaint   Patient presents with     RECHECK     Abdominal Pain     Panel Management     UTD       Initial /80 (BP Location: Right arm, Patient Position: Chair, Cuff Size: Adult Regular)  Pulse 105  Temp 98.6  F (37  C) (Oral)  Resp 12  Wt 158 lb (71.7 kg)  LMP 07/22/2017  SpO2 99%  BMI 28.9 kg/m2 Estimated body mass index is 28.9 kg/(m^2) as calculated from the following:    Height as of 7/1/17: 5' 2\" (1.575 m).    Weight as of this encounter: 158 lb (71.7 kg).  Medication Reconciliation: complete  "

## 2017-08-03 NOTE — MR AVS SNAPSHOT
After Visit Summary   8/3/2017    Katy Islas    MRN: [id number]           Patient Information     Date Of Birth          1990        Visit Information        Provider Department      8/3/2017 10:45 AM Dayana Roldan; Aura Rosario PA-C [facility]        Today's Diagnoses     Migraine with aura and without status migrainosus, not intractable    -  1    Chronic pain syndrome        Chronic bilateral low back pain with left-sided sciatica        Lumbar disc disease with radiculopathy        S/P lumbar fusion        Nausea        Encounter for surveillance of injectable contraceptive        Dysmenorrhea          Care Instructions        - Stop Oxycodone, START Hydrocodone (Norco) 4/day    - Recheck 2 weeks           Follow-ups after your visit        Your next 10 appointments already scheduled     Aug 18, 2017 10:00 AM CDT   Office Visit with Aura Rosario PA-C   [facility]    [address] [place] [id number] [phone]           Bring a current list of meds and any records pertaining to this visit. For Physicals, please bring immunization records and any forms needing to be filled out. Please arrive 10 minutes early to complete paperwork.              Who to contact     If you have questions or need follow up information about today's clinic visit or your schedule please contact [facility] directly at [phone].  Normal or non-critical lab and imaging results will be communicated to you by MyChart, letter or phone within 4 business days after the clinic has received the results. If you do not hear from us within 7 days, please contact the clinic through MyChart or phone. If you have a critical or abnormal lab result, we will notify you by phone as soon as possible.  Submit refill requests through [employer] or call your pharmacy and they will forward the refill  request to us. Please allow 3 business days for your refill to be completed.          Additional Information About Your Visit        "[employer]hart Information     [employer] gives you secure access to your electronic health record. If you see a primary care provider, you can also send messages to your care team and make appointments. If you have questions, please call your primary care clinic.  If you do not have a primary care provider, please call [phone] and they will assist you.        Care EveryWhere ID     This is your Care EveryWhere ID. This could be used by other organizations to access your [facility] medical records  [id number]        Your Vitals Were     Pulse Temperature Respirations Last Period Pulse Oximetry BMI (Body Mass Index)    105 98.6  F (37  C) (Oral) 12 07/22/2017 99% 28.9 kg/m2       Blood Pressure from Last 3 Encounters:   08/03/17 124/80   07/27/17 118/70   07/22/17 99/61    Weight from Last 3 Encounters:   08/03/17 158 lb (71.7 kg)   07/27/17 149 lb (67.6 kg)   07/22/17 150 lb (68 kg)              We Performed the Following     Beta HCG qual IFA urine          Today's Medication Changes          These changes are accurate as of: 8/3/17 11:43 AM.  If you have any questions, ask your nurse or doctor.               Start taking these medicines.        Dose/Directions    HYDROcodone-acetaminophen 5-325 MG per tablet   Commonly known as:  NORCO   Used for:  Chronic pain syndrome, Chronic bilateral low back pain with left-sided sciatica, S/P lumbar fusion, Lumbar disc disease with radiculopathy   Started by:  Aura Rosario PA-C        Dose:  1 tablet   Take 1 tablet by mouth every 4 hours as needed for pain maximum 4 tablet(s) per day   Quantity:  60 tablet   Refills:  0       medroxyPROGESTERone 150 MG/ML injection   Commonly known as:  DEPO-PROVERA   Used for:  Encounter for surveillance of injectable contraceptive, Dysmenorrhea   Started by:  Aura Rosario  LOVE        Dose:  150 mg   Inject 1 mL (150 mg) into the muscle every 3 months   Quantity:  3 mL   Refills:  3       rizatriptan 5 MG ODT tab   Commonly known as:  MAXALT-MLT   Used for:  Migraine with aura and without status migrainosus, not intractable   Started by:  Aura Rosario PA-C        Dose:  5-10 mg   Take 1-2 tablets (5-10 mg) by mouth at onset of headache for migraine May repeat in 2 hours. Max 6 tablets/24 hours.   Quantity:  18 tablet   Refills:  3         These medicines have changed or have updated prescriptions.        Dose/Directions    cyclobenzaprine 10 MG tablet   Commonly known as:  FLEXERIL   This may have changed:    - how much to take  - when to take this   Used for:  Chronic bilateral low back pain with left-sided sciatica   Changed by:  Aura Rosario PA-C        Dose:  20 mg   Take 2 tablets (20 mg) by mouth nightly as needed for muscle spasms or other (back pain)   Quantity:  60 tablet   Refills:  3       diazepam 5 MG tablet   Commonly known as:  VALIUM   This may have changed:    - when to take this  - additional instructions   Used for:  Chronic pain syndrome, Chronic bilateral low back pain with left-sided sciatica, Lumbar disc disease with radiculopathy, S/P lumbar fusion   Changed by:  Aura Rosario PA-C        Dose:  2.5-5 mg   Take 0.5-1 tablets (2.5-5 mg) by mouth every 12 hours as needed for anxiety or sleep (MUSCLE SPASM) (one month supply)   Quantity:  60 tablet   Refills:  3            Where to get your medicines      These medications were sent to [facility] [place], [place] - [address]  AT [facility] [address]  [address] , [facility] [address]     Phone:  [phone]     cyclobenzaprine 10 MG tablet    ondansetron 4 MG ODT tab    rizatriptan 5 MG ODT tab         Some of these will need a paper prescription and others can be bought over the counter.  Ask your nurse if you have  questions.     Bring a paper prescription for each of these medications     diazepam 5 MG tablet    HYDROcodone-acetaminophen 5-325 MG per tablet       You don't need a prescription for these medications     medroxyPROGESTERone 150 MG/ML injection                Primary Care Provider Office Phone # Fax #    Aura DIAZ LOVE Rosario [phone]       [address]  [facility] [address]        Equal Access to Services     VIKY VILLANUEVA : Hadii aad ku hadasho Soomaali, waaxda luqadaha, qaybta kaalmada adeegyada, waxay idiin hayaan adeeg vivi santo . So [facility] [phone].    ATENCIÓN: Si ilianala español, tiene a kerr disposición servicios gratuitos de asistencia lingüística. [facility] [phone].    We comply with applicable federal civil rights laws and [place] laws. We do not discriminate on the basis of race, color, national origin, age, disability sex, sexual orientation or gender identity.            Thank you!     Thank you for choosing [facility]  for your care. Our goal is always to provide you with excellent care. Hearing back from our patients is one way we can continue to improve our services. Please take a few minutes to complete the written survey that you may receive in the mail after your visit with us. Thank you!             Your Updated Medication List - Protect others around you: Learn how to safely use, store and throw away your medicines at www.disposemymeds.org.          This list is accurate as of: 8/3/17 11:43 AM.  Always use your most recent med list.                   Brand Name Dispense Instructions for use Diagnosis    ARIPiprazole 5 MG tablet    ABILIFY    90 tablet    Take 1 tablet (5 mg) by mouth At Bedtime    Adjustment disorder with mixed anxiety and depressed mood, Anxiety       cetirizine 10 MG tablet    zyrTEC     Take 10 mg by mouth daily        cyclobenzaprine 10 MG tablet    FLEXERIL    60 tablet    Take 2  tablets (20 mg) by mouth nightly as needed for muscle spasms or other (back pain)    Chronic bilateral low back pain with left-sided sciatica       dexlansoprazole 60 MG Cpdr CR capsule    DEXILANT    90 capsule    Take 1 capsule (60 mg) by mouth daily    Gastroesophageal reflux disease without esophagitis       diazepam 5 MG tablet    VALIUM    60 tablet    Take 0.5-1 tablets (2.5-5 mg) by mouth every 12 hours as needed for anxiety or sleep (MUSCLE SPASM) (one month supply)    Chronic pain syndrome, Chronic bilateral low back pain with left-sided sciatica, Lumbar disc disease with radiculopathy, S/P lumbar fusion       dicyclomine 20 MG tablet    BENTYL    30 tablet    Take 1 tablet (20 mg) by mouth 4 times daily as needed (ABDOMINAL CRAMPING) AS NEEDED FOR CRAMPING    Hx of Crohn's disease       DULoxetine 60 MG EC capsule    CYMBALTA    90 capsule    Take 1 capsule (60 mg) by mouth daily    Adjustment disorder with mixed anxiety and depressed mood       EPINEPHrine 0.3 MG/0.3ML injection 2-pack    EPIPEN/ADRENACLICK/or ANY BX GENERIC EQUIV    0.6 mL    Inject 0.3 mLs (0.3 mg) into the muscle once as needed for anaphylaxis    Bee sting allergy       FLEX-A-MIN JOINT FLEX PO      Take by mouth daily Reported on 3/28/2017        fluticasone 50 MCG/ACT spray    FLONASE    1 Bottle    Spray 2 sprays into both nostrils daily    Seasonal allergic rhinitis due to pollen       gabapentin 300 MG capsule    NEURONTIN    270 capsule    Take 1 capsule (300 mg) by mouth 3 times daily    Chronic bilateral low back pain with left-sided sciatica       HYDROcodone-acetaminophen 5-325 MG per tablet    NORCO    60 tablet    Take 1 tablet by mouth every 4 hours as needed for pain maximum 4 tablet(s) per day    Chronic pain syndrome, Chronic bilateral low back pain with left-sided sciatica, S/P lumbar fusion, Lumbar disc disease with radiculopathy       lidocaine 5 % Patch    LIDODERM    30 patch    Apply up to 3 patches to painful  area at once for up to 12 h within a 24 h period.  Remove after 12 hours.    Lumbar disc disease with radiculopathy       meclizine 12.5 MG tablet    ANTIVERT    30 tablet    Take 1 tablet (12.5 mg) by mouth 4 times daily as needed for dizziness    Dizziness       medical cannabis inhalation (Patient's own supply.  Not a prescription)      Inhale into the lungs 3 times daily as needed Reported on 3/28/2017        medroxyPROGESTERone 150 MG/ML injection    DEPO-PROVERA    3 mL    Inject 1 mL (150 mg) into the muscle every 3 months    Encounter for surveillance of injectable contraceptive, Dysmenorrhea       Multi-vitamin Tabs tablet   Generic drug:  multivitamin, therapeutic with minerals      Take 1 tablet by mouth daily Reported on 3/28/2017        ondansetron 4 MG ODT tab    ZOFRAN ODT    30 tablet    Take 1-2 tablets (4-8 mg) by mouth every 6 hours as needed for nausea Take 1-2 tablets by mouth as needed for nausea    Nausea       oxyCODONE 5 MG IR tablet    ROXICODONE    56 tablet    Take 1 tablet (5 mg) by mouth every 6 hours as needed for pain maximum 4 tablet(s) per day    S/P lumbar fusion, Chronic pain syndrome       pantoprazole 40 MG EC tablet    PROTONIX    90 tablet    Take 1 tablet (40 mg) by mouth daily Take 30-60 minutes before a meal.    Gastroesophageal reflux disease without esophagitis       promethazine 25 MG tablet    PHENERGAN    60 tablet    Take 1 tablet (25 mg) by mouth every 6 hours as needed for nausea    Hx of Crohn's disease       rizatriptan 5 MG ODT tab    MAXALT-MLT    18 tablet    Take 1-2 tablets (5-10 mg) by mouth at onset of headache for migraine May repeat in 2 hours. Max 6 tablets/24 hours.    Migraine with aura and without status migrainosus, not intractable       senna-docusate 8.6-50 MG per tablet    SENOKOT-S;PERICOLACE    60 tablet    Take 1 tablet by mouth 2 times daily as needed for constipation    Drug-induced constipation       sucralfate 1 GM/10ML suspension     CARAFATE    420 mL    Take 10 mLs (1 g) by mouth 4 times daily (before meals and nightly)    Abdominal pain, epigastric, Hematemesis with nausea

## 2017-08-03 NOTE — NURSING NOTE
Prior to injection verified patient identity using patient's name and date of birth.Patient instructed to remain in clinic for 20 minutes afterwards, and to report any adverse reaction to me immediately. Maya Cardenas CMA

## 2017-08-07 ENCOUNTER — TELEPHONE (OUTPATIENT)
Dept: FAMILY MEDICINE | Facility: OTHER | Age: 27
End: 2017-08-07

## 2017-08-07 NOTE — TELEPHONE ENCOUNTER
I called and they approved over the phone for 1 year until 8/7/17.  They will be sending confirmation fax.

## 2017-08-07 NOTE — TELEPHONE ENCOUNTER
Please submit PA. This formulation works for patient while non-ODT does not.    Zach Rosario PA-C  [facility]

## 2017-08-07 NOTE — TELEPHONE ENCOUNTER
Fax from [employer].  They have Rx for rizatriptan (MAXALT-MLT) 5 MG ODT tab    It is not covered.  Did you want to change or PA?     Ins [phone]  ID [id number]

## 2017-08-10 ENCOUNTER — HOSPITAL ENCOUNTER (EMERGENCY)
Facility: CLINIC | Age: 27
Discharge: LEFT BEFORE DISCHARGE | End: 2017-08-10
Attending: EMERGENCY MEDICINE | Admitting: EMERGENCY MEDICINE
Payer: MEDICARE

## 2017-08-10 VITALS
RESPIRATION RATE: 14 BRPM | HEART RATE: 74 BPM | SYSTOLIC BLOOD PRESSURE: 120 MMHG | DIASTOLIC BLOOD PRESSURE: 79 MMHG | OXYGEN SATURATION: 100 % | WEIGHT: 145 LBS | TEMPERATURE: 98.7 F | BODY MASS INDEX: 26.52 KG/M2

## 2017-08-10 DIAGNOSIS — R51.9 NONINTRACTABLE HEADACHE, UNSPECIFIED CHRONICITY PATTERN, UNSPECIFIED HEADACHE TYPE: ICD-10-CM

## 2017-08-10 DIAGNOSIS — R10.84 ABDOMINAL PAIN, GENERALIZED: ICD-10-CM

## 2017-08-10 DIAGNOSIS — G89.29 OTHER CHRONIC PAIN: ICD-10-CM

## 2017-08-10 LAB
ALBUMIN SERPL-MCNC: 3.7 G/DL (ref 3.4–5)
ALP SERPL-CCNC: 88 U/L (ref 40–150)
ALT SERPL W P-5'-P-CCNC: 33 U/L (ref 0–50)
ANION GAP SERPL CALCULATED.3IONS-SCNC: 4 MMOL/L (ref 3–14)
AST SERPL W P-5'-P-CCNC: 16 U/L (ref 0–45)
BASOPHILS # BLD AUTO: 0 10E9/L (ref 0–0.2)
BASOPHILS NFR BLD AUTO: 0.1 %
BILIRUB SERPL-MCNC: 0.3 MG/DL (ref 0.2–1.3)
BUN SERPL-MCNC: 12 MG/DL (ref 7–30)
CALCIUM SERPL-MCNC: 8.4 MG/DL (ref 8.5–10.1)
CHLORIDE SERPL-SCNC: 110 MMOL/L (ref 94–109)
CO2 SERPL-SCNC: 28 MMOL/L (ref 20–32)
CREAT SERPL-MCNC: 0.81 MG/DL (ref 0.52–1.04)
CRP SERPL-MCNC: <2.9 MG/L (ref 0–8)
DIFFERENTIAL METHOD BLD: ABNORMAL
EOSINOPHIL # BLD AUTO: 0.2 10E9/L (ref 0–0.7)
EOSINOPHIL NFR BLD AUTO: 2.2 %
ERYTHROCYTE [DISTWIDTH] IN BLOOD BY AUTOMATED COUNT: 12.2 % (ref 10–15)
GFR SERPL CREATININE-BSD FRML MDRD: 84 ML/MIN/1.7M2
GLUCOSE SERPL-MCNC: 71 MG/DL (ref 70–99)
HCT VFR BLD AUTO: 37.5 % (ref 35–47)
HGB BLD-MCNC: 12.8 G/DL (ref 11.7–15.7)
IMM GRANULOCYTES # BLD: 0 10E9/L (ref 0–0.4)
IMM GRANULOCYTES NFR BLD: 0 %
LACTATE BLD-SCNC: 1 MMOL/L (ref 0.7–2.1)
LYMPHOCYTES # BLD AUTO: 1.8 10E9/L (ref 0.8–5.3)
LYMPHOCYTES NFR BLD AUTO: 26 %
MCH RBC QN AUTO: 33.5 PG (ref 26.5–33)
MCHC RBC AUTO-ENTMCNC: 34.1 G/DL (ref 31.5–36.5)
MCV RBC AUTO: 98 FL (ref 78–100)
MONOCYTES # BLD AUTO: 0.4 10E9/L (ref 0–1.3)
MONOCYTES NFR BLD AUTO: 5.5 %
NEUTROPHILS # BLD AUTO: 4.5 10E9/L (ref 1.6–8.3)
NEUTROPHILS NFR BLD AUTO: 66.2 %
PLATELET # BLD AUTO: 264 10E9/L (ref 150–450)
POTASSIUM SERPL-SCNC: 3.6 MMOL/L (ref 3.4–5.3)
PROT SERPL-MCNC: 7.5 G/DL (ref 6.8–8.8)
RBC # BLD AUTO: 3.82 10E12/L (ref 3.8–5.2)
SODIUM SERPL-SCNC: 142 MMOL/L (ref 133–144)
WBC # BLD AUTO: 6.9 10E9/L (ref 4–11)

## 2017-08-10 PROCEDURE — 99284 EMERGENCY DEPT VISIT MOD MDM: CPT | Mod: Z6 | Performed by: EMERGENCY MEDICINE

## 2017-08-10 PROCEDURE — 96365 THER/PROPH/DIAG IV INF INIT: CPT | Performed by: EMERGENCY MEDICINE

## 2017-08-10 PROCEDURE — 86140 C-REACTIVE PROTEIN: CPT | Performed by: EMERGENCY MEDICINE

## 2017-08-10 PROCEDURE — 25000128 H RX IP 250 OP 636: Performed by: EMERGENCY MEDICINE

## 2017-08-10 PROCEDURE — 83605 ASSAY OF LACTIC ACID: CPT | Performed by: EMERGENCY MEDICINE

## 2017-08-10 PROCEDURE — 80053 COMPREHEN METABOLIC PANEL: CPT | Performed by: EMERGENCY MEDICINE

## 2017-08-10 PROCEDURE — 96375 TX/PRO/DX INJ NEW DRUG ADDON: CPT | Performed by: EMERGENCY MEDICINE

## 2017-08-10 PROCEDURE — 99284 EMERGENCY DEPT VISIT MOD MDM: CPT | Mod: 25 | Performed by: EMERGENCY MEDICINE

## 2017-08-10 PROCEDURE — 85025 COMPLETE CBC W/AUTO DIFF WBC: CPT | Performed by: EMERGENCY MEDICINE

## 2017-08-10 RX ORDER — DIPHENHYDRAMINE HYDROCHLORIDE 50 MG/ML
50 INJECTION INTRAMUSCULAR; INTRAVENOUS ONCE
Status: COMPLETED | OUTPATIENT
Start: 2017-08-10 | End: 2017-08-10

## 2017-08-10 RX ORDER — MAGNESIUM SULFATE 1 G/100ML
1 INJECTION INTRAVENOUS ONCE
Status: COMPLETED | OUTPATIENT
Start: 2017-08-10 | End: 2017-08-10

## 2017-08-10 RX ORDER — PROMETHAZINE HYDROCHLORIDE 25 MG/ML
25 INJECTION, SOLUTION INTRAMUSCULAR; INTRAVENOUS ONCE
Status: COMPLETED | OUTPATIENT
Start: 2017-08-10 | End: 2017-08-10

## 2017-08-10 RX ORDER — SODIUM CHLORIDE 9 MG/ML
1000 INJECTION, SOLUTION INTRAVENOUS CONTINUOUS
Status: DISCONTINUED | OUTPATIENT
Start: 2017-08-10 | End: 2017-08-10 | Stop reason: HOSPADM

## 2017-08-10 RX ADMIN — SODIUM CHLORIDE 1000 ML: 9 INJECTION, SOLUTION INTRAVENOUS at 17:59

## 2017-08-10 RX ADMIN — PROMETHAZINE HYDROCHLORIDE 25 MG: 25 INJECTION INTRAMUSCULAR; INTRAVENOUS at 18:03

## 2017-08-10 RX ADMIN — MAGNESIUM SULFATE IN DEXTROSE 1 G: 10 INJECTION, SOLUTION INTRAVENOUS at 18:07

## 2017-08-10 RX ADMIN — DIPHENHYDRAMINE HYDROCHLORIDE 50 MG: 50 INJECTION, SOLUTION INTRAMUSCULAR; INTRAVENOUS at 18:01

## 2017-08-10 ASSESSMENT — ENCOUNTER SYMPTOMS
CHILLS: 0
NAUSEA: 1
FEVER: 0
ABDOMINAL PAIN: 1
HEADACHES: 1
BLOOD IN STOOL: 0
SORE THROAT: 0
VOMITING: 1

## 2017-08-10 NOTE — ED NOTES
Pt stated that she forgot to get urine sample when in bathroom.  Pt requested that I ask nurse/doctor about medications for abdominal pain and nausea.  Nurse and MD informed.

## 2017-08-10 NOTE — ED PROVIDER NOTES
History     Chief Complaint   Patient presents with     Abdominal Pain     Nausea & Vomiting     Headache     The history is provided by the patient and a parent.     Katy Islas is a 27 year old female with a history of hearing loss, esophageal reflux, migraines, pseudoseizures, asthma, Crohn's disease, depression, anxiety, and kidney stones who presents to the ED complaining of abdominal pain, nausea, vomiting, and a headache. Patient reports that she has been using Depo birth control for the past 5 years but went off it for 9 months. Patient recently started using it again to level out her hormones and improve her cramping. Patient denies ear pain, bloody stools, fever, chills, sore throat, urinating issues, and abdominal surgeries. She states that her headache isn't a migraine. She has a history of kidney stones and infections as well as back surgeries. Patient's most recent endoscopy was normal. She doesn't remember the results of her most recent colonoscopy. She doesn't have a GI provider. Patient isn't on medications for Crohn's disease, she hasn't taken steroids for a long time.  She was previously on Remicade but due to insurance issues she is no longer on this.  No exposure to infectious GI illness.    I have reviewed the Medications, Allergies, Past Medical and Surgical History, and Social History in the Epic system.    Allergies:   Allergies   Allergen Reactions     Ativan [Lorazepam] Hives     At the IV site     Baclofen      hives     Bees Hives, Swelling and Difficulty breathing     Caffeine      Contrast Dye      Hives,   Updated 5/10/2016 CT Contrast.     Diazepam      IV form makes her become aggressive and angry     Iodine Hives     Methocarbamol Swelling     Metoclopramide      Other reaction(s): Tremors  LW Reaction: shaking/sweating     Midazolam      Other reaction(s): Agitation     Monosodium Glutamate      Morphine Other (See Comments)     Difficulty with urination     Nsaids Other  (See Comments)     GI BLEED x2     Other (Do Not Use) Other (See Comments)     Xanaflex- pt becomes disoriented and loses bladder control     Percocet [Oxycodone-Acetaminophen] Itching     Reglan [Metoclopramide Hcl] Other (See Comments)     shaking     Soma [Carisoprodol] Visual Disturbance     Sleep walking     Tizanidine      Topamax Other (See Comments)     Topamax [Topiramate] Nausea     Tingling  GI/Vomit     Tramadol      Severe Headache, Seizure Risk     Tylenol W/Codeine [Acetaminophen-Codeine] Nausea and Itching     Tylenol 3     Valium Other (See Comments)     Becomes aggressive and angry     Versed Other (See Comments)     Zolmitriptan      Makes face feel like its twitching     Droperidol Anxiety     Flu Virus Vaccine Rash      Arm swelling         No current facility-administered medications on file prior to encounter.   Current Outpatient Prescriptions on File Prior to Encounter:  diazepam (VALIUM) 5 MG tablet Take 0.5-1 tablets (2.5-5 mg) by mouth every 12 hours as needed for anxiety or sleep (MUSCLE SPASM) (one month supply)   cyclobenzaprine (FLEXERIL) 10 MG tablet Take 2 tablets (20 mg) by mouth nightly as needed for muscle spasms or other (back pain)   ondansetron (ZOFRAN ODT) 4 MG ODT tab Take 1-2 tablets (4-8 mg) by mouth every 6 hours as needed for nausea Take 1-2 tablets by mouth as needed for nausea   rizatriptan (MAXALT-MLT) 5 MG ODT tab Take 1-2 tablets (5-10 mg) by mouth at onset of headache for migraine May repeat in 2 hours. Max 6 tablets/24 hours.   medroxyPROGESTERone (DEPO-PROVERA) 150 MG/ML injection Inject 1 mL (150 mg) into the muscle every 3 months   HYDROcodone-acetaminophen (NORCO) 5-325 MG per tablet Take 1 tablet by mouth every 4 hours as needed for pain maximum 4 tablet(s) per day   oxyCODONE (ROXICODONE) 5 MG IR tablet Take 1 tablet (5 mg) by mouth every 6 hours as needed for pain maximum 4 tablet(s) per day   ARIPiprazole (ABILIFY) 5 MG tablet Take 1 tablet (5 mg) by mouth  At Bedtime   sucralfate (CARAFATE) 1 GM/10ML suspension Take 10 mLs (1 g) by mouth 4 times daily (before meals and nightly)   cetirizine (ZYRTEC) 10 MG tablet Take 10 mg by mouth daily   dicyclomine (BENTYL) 20 MG tablet Take 1 tablet (20 mg) by mouth 4 times daily as needed (ABDOMINAL CRAMPING) AS NEEDED FOR CRAMPING   promethazine (PHENERGAN) 25 MG tablet Take 1 tablet (25 mg) by mouth every 6 hours as needed for nausea   pantoprazole (PROTONIX) 40 MG EC tablet Take 1 tablet (40 mg) by mouth daily Take 30-60 minutes before a meal.   dexlansoprazole (DEXILANT) 60 MG CPDR CR capsule Take 1 capsule (60 mg) by mouth daily   gabapentin (NEURONTIN) 300 MG capsule Take 1 capsule (300 mg) by mouth 3 times daily   lidocaine (LIDODERM) 5 % Patch Apply up to 3 patches to painful area at once for up to 12 h within a 24 h period.  Remove after 12 hours.   senna-docusate (SENOKOT-S;PERICOLACE) 8.6-50 MG per tablet Take 1 tablet by mouth 2 times daily as needed for constipation   meclizine (ANTIVERT) 12.5 MG tablet Take 1 tablet (12.5 mg) by mouth 4 times daily as needed for dizziness   fluticasone (FLONASE) 50 MCG/ACT spray Spray 2 sprays into both nostrils daily   medical cannabis inhalation (Patient's own supply.  Not a prescription) Inhale into the lungs 3 times daily as needed Reported on 3/28/2017   DULoxetine (CYMBALTA) 60 MG EC capsule Take 1 capsule (60 mg) by mouth daily   EPINEPHrine (EPIPEN) 0.3 MG/0.3ML injection Inject 0.3 mLs (0.3 mg) into the muscle once as needed for anaphylaxis   Misc Natural Products (FLEX-A-MIN JOINT FLEX PO) Take by mouth daily Reported on 3/28/2017   Multiple Vitamin (MULTI-VITAMIN) per tablet Take 1 tablet by mouth daily Reported on 3/28/2017       Patient Active Problem List   Diagnosis     Hearing loss     Allergic rhinitis     Migraine     Benign neoplasm of skin of lower limb, including hip     Dysmenorrhea     Crohn's colitis (H)     Fibromyalgia     Mild major depression (H)      Anxiety     Chronic pain     Oral thrush     Nausea and vomiting     Abdominal pain     Grand mal seizure disorder (H)     Health Care Home     Bipolar disorder (H)     Marijuana abuse     Adjustment disorder with mixed anxiety and depressed mood     Herpes simplex virus infection     Gastroesophageal reflux disease without esophagitis     Bee sting allergy     Mild persistent asthma without complication     Chronic bilateral low back pain with left-sided sciatica     Vitamin D deficiency     Atypical mole     Lumbar disc disease with radiculopathy     S/P lumbar fusion     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)     Upper GI bleed - suspected     Anemia due to blood loss, acute     Tobacco abuse     History of pseudoseizure       Past Surgical History:   Procedure Laterality Date     COLONOSCOPY  7/1/2009    [facility], [facility].     FUSION SPINE POSTERIOR MINIMALLY INVASIVE ONE LEVEL N/A 2/23/2017    Procedure: FUSION SPINE POSTERIOR MINIMALLY INVASIVE ONE LEVEL;  L4-5 Oblique Lateral Lumbar Interbody Fusion   Epidural steroid injection.   Transpedicular Bone marrow aspiration;  Surgeon: Jeniffer Eugene MD;  Location:  OR      COLONOSCOPY THRU STOMA WITH BIOPSY  10/29/2003    Impression is that of normal appearing colonoscopy, without evidence of rectal bleeding.     HC COLONOSCOPY THRU STOMA, DIAGNOSTIC  10/00    normal     HC COLONOSCOPY THRU STOMA, DIAGNOSTIC  Oct 2009    Dr López- normal     HC EEG AWAKE AND SLEEP      abnormal     HC MRI BRAIN W/O CONTRAST  12/00    normal     HC REMOVAL GALLBLADDER  8/5/2009    [facility], [facility].      UGI ENDOSCOPY DIAG W BIOPSY  11/11/09    Normal esophagus      UGI ENDOSCOPY, SIMPLE EXAM  7/00, 10/00    mild chronic esophagitis and duodenitis, neg H pylori      UGI ENDOSCOPY, SIMPLE EXAM  01/20/2005    Esophagogastroduodenoscopy, colonoscopy with biopsies.  [facility] UGI ENDOSCOPY, SIMPLE EXAM   "7/1/2009    [facility] [place]     HC UGI ENDOSCOPY, SIMPLE EXAM  11/11/2009    attempted upper GI, pt. could not tolerate procedure:[facility]     ORTHOPEDIC SURGERY  October 19,2011    diskectomy L4-L5       Social History   Substance Use Topics     Smoking status: Current Every Day Smoker     Packs/day: 0.25     Types: Cigarettes     Smokeless tobacco: Never Used     Alcohol use 0.6 oz/week     1 Cans of beer per week      Comment: 1-2 drinks montlhlitzel       Most Recent Immunizations   Administered Date(s) Administered     DPT 08/11/1994     HPVQuadrivalent 07/10/2009     HepB-Peds 01/10/2001     Influenza (IIV3) 11/20/2007     MMR 06/27/2001     Mantoux 03/08/2010     Meningococcal (Menactra ) 11/01/2006     OPV 08/11/1994     TD (ADULT, 7+) 06/27/2001     TDAP Vaccine (Adacel) 08/16/2011   Deferred Date(s) Deferred     Mantoux 04/20/2012       BMI: Estimated body mass index is 26.52 kg/(m^2) as calculated from the following:    Height as of 7/1/17: 1.575 m (5' 2\").    Weight as of this encounter: 65.8 kg (145 lb).      Review of Systems   Constitutional: Negative for chills and fever.   HENT: Negative for ear pain and sore throat.    Gastrointestinal: Positive for abdominal pain, nausea and vomiting. Negative for blood in stool.   Genitourinary:        No urination issues.   Neurological: Positive for headaches (not a migraine).   All other systems reviewed and are negative.      Physical Exam   BP: 120/79  Pulse: 74  Temp: 98.7  F (37.1  C)  Resp: 14  Weight: 65.8 kg (145 lb)  SpO2: 100 %  Physical Exam Gen. alert cooperative female who does not look acutely ill.  She does have facial pallor.  HEENT reveals ears to be clear bilaterally.  Eyes reveal no photophobia.  Pupils are equal and reactive to light and accommodation.  Extraocular muscles are intact.  No nasal swelling.  Oral mucosa is moist.  Neck reveals tenderness of the trapezius muscles but no meningismus.  No stridor.  Lungs " are clear without adventitious sounds.  Cardiac regular rate without murmur.  Back she has left CVA tenderness to percussion.  Abdomen reveals active bowel sounds and palpation she has diffuse mild tenderness that is not localized.  She has no guarding or rebound.  There is no organomegaly or masses.  No leg pain or swelling.    ED Course     ED Course     Procedures             Critical Care time:  none              Results for orders placed or performed during the hospital encounter of 08/10/17 (from the past 24 hour(s))   CBC with platelets differential   Result Value Ref Range    WBC 6.9 4.0 - 11.0 10e9/L    RBC Count 3.82 3.8 - 5.2 10e12/L    Hemoglobin 12.8 11.7 - 15.7 g/dL    Hematocrit 37.5 35.0 - 47.0 %    MCV 98 78 - 100 fl    MCH 33.5 (H) 26.5 - 33.0 pg    MCHC 34.1 31.5 - 36.5 g/dL    RDW 12.2 10.0 - 15.0 %    Platelet Count 264 150 - 450 10e9/L    Diff Method Automated Method     % Neutrophils 66.2 %    % Lymphocytes 26.0 %    % Monocytes 5.5 %    % Eosinophils 2.2 %    % Basophils 0.1 %    % Immature Granulocytes 0.0 %    Absolute Neutrophil 4.5 1.6 - 8.3 10e9/L    Absolute Lymphocytes 1.8 0.8 - 5.3 10e9/L    Absolute Monocytes 0.4 0.0 - 1.3 10e9/L    Absolute Eosinophils 0.2 0.0 - 0.7 10e9/L    Absolute Basophils 0.0 0.0 - 0.2 10e9/L    Abs Immature Granulocytes 0.0 0 - 0.4 10e9/L   Comprehensive metabolic panel   Result Value Ref Range    Sodium 142 133 - 144 mmol/L    Potassium 3.6 3.4 - 5.3 mmol/L    Chloride 110 (H) 94 - 109 mmol/L    Carbon Dioxide 28 20 - 32 mmol/L    Anion Gap 4 3 - 14 mmol/L    Glucose 71 70 - 99 mg/dL    Urea Nitrogen 12 7 - 30 mg/dL    Creatinine 0.81 0.52 - 1.04 mg/dL    GFR Estimate 84 >60 mL/min/1.7m2    GFR Estimate If Black >90   GFR Calc   >60 mL/min/1.7m2    Calcium 8.4 (L) 8.5 - 10.1 mg/dL    Bilirubin Total 0.3 0.2 - 1.3 mg/dL    Albumin 3.7 3.4 - 5.0 g/dL    Protein Total 7.5 6.8 - 8.8 g/dL    Alkaline Phosphatase 88 40 - 150 U/L    ALT 33 0 - 50  U/L    AST 16 0 - 45 U/L   Lactic acid whole blood   Result Value Ref Range    Lactic Acid 1.0 0.7 - 2.1 mmol/L   CRP inflammation   Result Value Ref Range    CRP Inflammation <2.9 0.0 - 8.0 mg/L     *Note: Due to a large number of results and/or encounters for the requested time period, some results have not been displayed. A complete set of results can be found in Results Review.       Medications   0.9% sodium chloride BOLUS (1,000 mLs Intravenous New Bag 8/10/17 1759)     Followed by   0.9% sodium chloride infusion (not administered)   promethazine (PHENERGAN) IV injection 25 mg (25 mg Intravenous Given 8/10/17 1803)   diphenhydrAMINE (BENADRYL) injection 50 mg (50 mg Intravenous Given 8/10/17 1801)   magnesium sulfate 1 gm in 100mL D5W intermittent infusion (0 g Intravenous Stopped 8/10/17 1837)     IV was established and blood work was obtained.  Urinalysis was requested.  Patient was given IV fluids, Benadryl, magnesium, and Phenergan.  She repeatedly asked for narcotic pain medicines.   Recent concerns for med compliance issues received narcotics through the emergency room.  She has a pain contract with [facility].  Patient just had received 60 tablets of hydrocodone and 60 tablets of diazepam on the 3rd of this month.   Patient was requested to provide a urine sample but despite this she urinated in the toilet on 2 occasions.  When she was informed she would not receive narcotic pain medicines became quite angry and pulled out her IV and left without further evaluation or completion of evaluation.  Assessments & Plan (with Medical Decision Making)   Patient is a 27-year-old female presents with complaints of headache and abdominal pain.  Patient has a history of chronic pain issues including migraine and chronic back pain.  She is on chronic narcotic and benzodiazepine therapy.  Recently filled prescription for hydrocodone and diazepam on the 3rd.  She received 60 tablets of both of these medicines.   Recent telephone with concerns for patient receiving narcotics through the emergency room.  Also reviewed records through care everywhere show there is concerns for previous drug-seeking behavior.  Patient presents with complaints of headache and abdominal pain.  She does have a history of Crohn's and is currently not on any suppressive therapy or steroids.  She denies any bloody diarrhea.  She's not had fever or chills.  Her headache is not typical for her migraine.  On initial evaluation patient was afebrile and vital signs are stable.  Not hypoxic.  HEENT exam was unremarkable.  She did have tenderness of the trapezius muscles of her neck but no meningismus.  She had left CVA tenderness on back exam.  Abdominal exam was diffusely tender but this was mild in the patient had no guarding or rebound.  Blood work was obtained and was normal including normal white count, lactic acid and CRP.  Doubt she's having acute exacerbation of her Crohn's disease.  She was given IV fluids, Benadryl, Phenergan, and magnesium for her headache.  She was recently told not to take NSAIDS.  Patient repeatedly asked for narcotics to the staff.  I told her initially I would not provide this until we have more information included blood work, urinalysis, and possible imaging.  She was asked to provide a urine sample and went to the restroom twice and urinated in the toilet and did not provide a sample.  When she was told she would not receive narcotics until the evaluation was complete she became angry and pulled out her IV.  She eloped without further evaluation or discussion.  Her evaluation was not complete as we had not received urine sample or any imaging.  I have reviewed the nursing notes.    I have reviewed the findings, diagnosis, plan and need for follow up with the patient.       Discharge Medication List as of 8/10/2017  6:57 PM          Final diagnoses:   Nonintractable headache, unspecified chronicity pattern, unspecified  headache type   Abdominal pain, generalized     This document serves as a record of services personally performed by Hung Rico MD. It was created on their behalf by Faraz Tong, a trained medical scribe. The creation of this record is based on the provider's personal observations and the statements of the patient. This document has been checked and approved by the attending provider.    Note: Chart documentation done in part with Dragon Voice Recognition software. Although reviewed after completion, some word and grammatical errors may remain.    8/10/2017   [facility] EMERGENCY DEPARTMENT     Hung Rico MD  08/10/17 1430

## 2017-08-12 NOTE — ED NOTES
When I told Dr Rico that Pt wanted Dilaudid, he said wait to see what her lab shows and also see if other meds help her pain.

## 2017-08-12 NOTE — ED NOTES
Pt has her light on asking for Dilaudid, told that Dr Rico wants a Urine drug screen first and he will not give any narcotics without those results.

## 2017-08-12 NOTE — ED NOTES
"I went in to tell patient that Dr Rico re-affirmed that he was not going to give her narcotics and she pulled off the blood pressure cuff and started pulling off the tape of her IV. I quickly helped her so she does not get blood all over. IV DC'd. She jumps out of bed, gets her clothes on and says something I do not understand, I question her mother what she said and she said, \"it was naughty\". Katy leaves the room, slamming the door behind her.   "

## 2017-08-13 ENCOUNTER — MYC MEDICAL ADVICE (OUTPATIENT)
Dept: FAMILY MEDICINE | Facility: OTHER | Age: 27
End: 2017-08-13

## 2017-08-14 ENCOUNTER — MYC REFILL (OUTPATIENT)
Dept: FAMILY MEDICINE | Facility: OTHER | Age: 27
End: 2017-08-14

## 2017-08-14 DIAGNOSIS — Z98.1 S/P LUMBAR FUSION: ICD-10-CM

## 2017-08-14 DIAGNOSIS — M54.42 CHRONIC BILATERAL LOW BACK PAIN WITH LEFT-SIDED SCIATICA: ICD-10-CM

## 2017-08-14 DIAGNOSIS — R11.0 NAUSEA: ICD-10-CM

## 2017-08-14 DIAGNOSIS — G89.4 CHRONIC PAIN SYNDROME: ICD-10-CM

## 2017-08-14 DIAGNOSIS — G89.29 CHRONIC BILATERAL LOW BACK PAIN WITH LEFT-SIDED SCIATICA: ICD-10-CM

## 2017-08-14 DIAGNOSIS — M51.16 LUMBAR DISC DISEASE WITH RADICULOPATHY: ICD-10-CM

## 2017-08-14 RX ORDER — HYDROCODONE BITARTRATE AND ACETAMINOPHEN 5; 325 MG/1; MG/1
1 TABLET ORAL EVERY 4 HOURS PRN
Qty: 60 TABLET | Refills: 0 | Status: CANCELLED | OUTPATIENT
Start: 2017-08-14

## 2017-08-14 NOTE — TELEPHONE ENCOUNTER
Patient scheduled with you right now for 18th at 10:00 am. She was down for mole removal and follow up on pain meds.  You do have openings next Mon. Tuesday. What time and how much time would you like me to schedule her for.

## 2017-08-14 NOTE — TELEPHONE ENCOUNTER
HYDROcodone-acetaminophen (NORCO) 5-325 MG per tablet      Last Written Prescription Date:  8/3/17  Last Fill Quantity: 60,   # refills: 0  Last Office Visit with [facility], [facility] or [facility] prescribing provider: 8/3/17  Future Office visit:    Next 5 appointments (look out 90 days)     Aug 21, 2017  1:00 PM CDT   [facility]    [address] [place] [id number]   [phone]                   Routing refill request to provider for review/approval because:  Drug not on the [facility], [facility] or [facility] refill protocol or controlled substance

## 2017-08-14 NOTE — TELEPHONE ENCOUNTER
Needs 60 min please. OK for Monday or Tuesday.     Zach Rosario PA-C  [facility]

## 2017-08-15 ENCOUNTER — MYC REFILL (OUTPATIENT)
Dept: FAMILY MEDICINE | Facility: OTHER | Age: 27
End: 2017-08-15

## 2017-08-15 DIAGNOSIS — R11.0 NAUSEA: ICD-10-CM

## 2017-08-15 DIAGNOSIS — G89.29 CHRONIC BILATERAL LOW BACK PAIN WITH LEFT-SIDED SCIATICA: ICD-10-CM

## 2017-08-15 DIAGNOSIS — G89.4 CHRONIC PAIN SYNDROME: ICD-10-CM

## 2017-08-15 DIAGNOSIS — M51.16 LUMBAR DISC DISEASE WITH RADICULOPATHY: ICD-10-CM

## 2017-08-15 DIAGNOSIS — Z98.1 S/P LUMBAR FUSION: ICD-10-CM

## 2017-08-15 DIAGNOSIS — M54.42 CHRONIC BILATERAL LOW BACK PAIN WITH LEFT-SIDED SCIATICA: ICD-10-CM

## 2017-08-15 RX ORDER — ONDANSETRON 4 MG/1
4-8 TABLET, ORALLY DISINTEGRATING ORAL EVERY 6 HOURS PRN
Qty: 60 TABLET | Refills: 3 | Status: SHIPPED | OUTPATIENT
Start: 2017-08-15 | End: 2018-01-15

## 2017-08-15 RX ORDER — ONDANSETRON 4 MG/1
4-8 TABLET, ORALLY DISINTEGRATING ORAL EVERY 6 HOURS PRN
Qty: 30 TABLET | Refills: 3 | OUTPATIENT
Start: 2017-08-15

## 2017-08-15 RX ORDER — HYDROCODONE BITARTRATE AND ACETAMINOPHEN 5; 325 MG/1; MG/1
1 TABLET ORAL EVERY 4 HOURS PRN
Qty: 60 TABLET | Refills: 0 | Status: SHIPPED | OUTPATIENT
Start: 2017-08-15 | End: 2017-08-28

## 2017-08-15 NOTE — TELEPHONE ENCOUNTER
Message from [facility]:  Original authorizing provider: LOVE Bishop would like a refill of the following medications:  ondansetron (ZOFRAN ODT) 4 MG ODT tab [Aura Rosario PA-C]    Preferred pharmacy: [facility] [address] [place] [address] [id number] AT [facility] [address]    Comment:  Lost the zofran. Cannot find it. Pls refill asap thanks and soon out of hydrocodone.

## 2017-08-15 NOTE — TELEPHONE ENCOUNTER
Please call patient asap she is feeling like she is going to throw up. Would like refill asap today.

## 2017-08-21 ENCOUNTER — HOSPITAL ENCOUNTER (EMERGENCY)
Facility: CLINIC | Age: 27
Discharge: HOME | End: 2017-08-21
Attending: EMERGENCY MEDICINE | Admitting: EMERGENCY MEDICINE
Payer: MEDICARE

## 2017-08-21 VITALS
WEIGHT: 157.5 LBS | BODY MASS INDEX: 28.81 KG/M2 | TEMPERATURE: 98.6 F | DIASTOLIC BLOOD PRESSURE: 90 MMHG | SYSTOLIC BLOOD PRESSURE: 139 MMHG | OXYGEN SATURATION: 98 % | HEART RATE: 108 BPM | RESPIRATION RATE: 16 BRPM

## 2017-08-21 DIAGNOSIS — K50.90 CROHN'S DISEASE WITHOUT COMPLICATION, UNSPECIFIED GASTROINTESTINAL TRACT LOCATION (H): ICD-10-CM

## 2017-08-21 DIAGNOSIS — R10.12 ABDOMINAL PAIN, LEFT UPPER QUADRANT: ICD-10-CM

## 2017-08-21 DIAGNOSIS — Z79.891 ADMISSION FOR LONG-TERM OPIATE ANALGESIC USE: ICD-10-CM

## 2017-08-21 LAB
ALBUMIN SERPL-MCNC: 3.9 G/DL (ref 3.4–5)
ALBUMIN UR-MCNC: NEGATIVE MG/DL
ALP SERPL-CCNC: 113 U/L (ref 40–150)
ALT SERPL W P-5'-P-CCNC: 47 U/L (ref 0–50)
AMPHETAMINES UR QL: NOT DETECTED NG/ML
ANION GAP SERPL CALCULATED.3IONS-SCNC: 11 MMOL/L (ref 3–14)
APPEARANCE UR: CLEAR
AST SERPL W P-5'-P-CCNC: 19 U/L (ref 0–45)
BARBITURATES UR QL SCN: NOT DETECTED NG/ML
BASOPHILS # BLD AUTO: 0 10E9/L (ref 0–0.2)
BASOPHILS NFR BLD AUTO: 0.2 %
BENZODIAZ UR QL SCN: ABNORMAL NG/ML
BILIRUB SERPL-MCNC: 0.3 MG/DL (ref 0.2–1.3)
BILIRUB UR QL STRIP: NEGATIVE
BUN SERPL-MCNC: 14 MG/DL (ref 7–30)
BUPRENORPHINE UR QL: NOT DETECTED NG/ML
CALCIUM SERPL-MCNC: 9.3 MG/DL (ref 8.5–10.1)
CANNABINOIDS UR QL: ABNORMAL NG/ML
CHLORIDE SERPL-SCNC: 103 MMOL/L (ref 94–109)
CO2 SERPL-SCNC: 26 MMOL/L (ref 20–32)
COCAINE UR QL SCN: NOT DETECTED NG/ML
COLOR UR AUTO: YELLOW
CREAT SERPL-MCNC: 0.75 MG/DL (ref 0.52–1.04)
D-METHAMPHET UR QL: NOT DETECTED NG/ML
DIFFERENTIAL METHOD BLD: ABNORMAL
EOSINOPHIL # BLD AUTO: 0 10E9/L (ref 0–0.7)
EOSINOPHIL NFR BLD AUTO: 0 %
ERYTHROCYTE [DISTWIDTH] IN BLOOD BY AUTOMATED COUNT: 11.9 % (ref 10–15)
GFR SERPL CREATININE-BSD FRML MDRD: >90 ML/MIN/1.7M2
GLUCOSE SERPL-MCNC: 91 MG/DL (ref 70–99)
GLUCOSE UR STRIP-MCNC: NEGATIVE MG/DL
HCG UR QL: NEGATIVE
HCT VFR BLD AUTO: 41.3 % (ref 35–47)
HGB BLD-MCNC: 14.1 G/DL (ref 11.7–15.7)
HGB UR QL STRIP: NEGATIVE
IMM GRANULOCYTES # BLD: 0 10E9/L (ref 0–0.4)
IMM GRANULOCYTES NFR BLD: 0.2 %
KETONES UR STRIP-MCNC: NEGATIVE MG/DL
LEUKOCYTE ESTERASE UR QL STRIP: NEGATIVE
LIPASE SERPL-CCNC: 76 U/L (ref 73–393)
LYMPHOCYTES # BLD AUTO: 0.7 10E9/L (ref 0.8–5.3)
LYMPHOCYTES NFR BLD AUTO: 12.3 %
MCH RBC QN AUTO: 32.4 PG (ref 26.5–33)
MCHC RBC AUTO-ENTMCNC: 34.1 G/DL (ref 31.5–36.5)
MCV RBC AUTO: 95 FL (ref 78–100)
METHADONE UR QL SCN: NOT DETECTED NG/ML
MONOCYTES # BLD AUTO: 0.3 10E9/L (ref 0–1.3)
MONOCYTES NFR BLD AUTO: 5.7 %
MUCOUS THREADS #/AREA URNS LPF: PRESENT /LPF
NEUTROPHILS # BLD AUTO: 4.9 10E9/L (ref 1.6–8.3)
NEUTROPHILS NFR BLD AUTO: 81.6 %
NITRATE UR QL: NEGATIVE
OPIATES UR QL SCN: ABNORMAL NG/ML
OXYCODONE UR QL SCN: NOT DETECTED NG/ML
PCP UR QL SCN: NOT DETECTED NG/ML
PH UR STRIP: 6 PH (ref 5–7)
PLATELET # BLD AUTO: 318 10E9/L (ref 150–450)
POTASSIUM SERPL-SCNC: 3.8 MMOL/L (ref 3.4–5.3)
PROPOXYPH UR QL: NOT DETECTED NG/ML
PROT SERPL-MCNC: 8.2 G/DL (ref 6.8–8.8)
RBC # BLD AUTO: 4.35 10E12/L (ref 3.8–5.2)
RBC #/AREA URNS AUTO: 0 /HPF (ref 0–2)
SODIUM SERPL-SCNC: 140 MMOL/L (ref 133–144)
SOURCE: ABNORMAL
SP GR UR STRIP: 1.01 (ref 1–1.03)
SQUAMOUS #/AREA URNS AUTO: 1 /HPF (ref 0–1)
TRICYCLICS UR QL SCN: NOT DETECTED NG/ML
UROBILINOGEN UR STRIP-MCNC: 0 MG/DL (ref 0–2)
WBC # BLD AUTO: 6 10E9/L (ref 4–11)
WBC #/AREA URNS AUTO: 1 /HPF (ref 0–2)

## 2017-08-21 PROCEDURE — 96361 HYDRATE IV INFUSION ADD-ON: CPT | Performed by: EMERGENCY MEDICINE

## 2017-08-21 PROCEDURE — 80306 DRUG TEST PRSMV INSTRMNT: CPT | Performed by: EMERGENCY MEDICINE

## 2017-08-21 PROCEDURE — 99284 EMERGENCY DEPT VISIT MOD MDM: CPT | Mod: Z6 | Performed by: EMERGENCY MEDICINE

## 2017-08-21 PROCEDURE — 81001 URINALYSIS AUTO W/SCOPE: CPT | Mod: XU | Performed by: EMERGENCY MEDICINE

## 2017-08-21 PROCEDURE — 99284 EMERGENCY DEPT VISIT MOD MDM: CPT | Mod: 25 | Performed by: EMERGENCY MEDICINE

## 2017-08-21 PROCEDURE — 83690 ASSAY OF LIPASE: CPT | Performed by: EMERGENCY MEDICINE

## 2017-08-21 PROCEDURE — 25000128 H RX IP 250 OP 636: Performed by: EMERGENCY MEDICINE

## 2017-08-21 PROCEDURE — 80053 COMPREHEN METABOLIC PANEL: CPT | Performed by: EMERGENCY MEDICINE

## 2017-08-21 PROCEDURE — 81025 URINE PREGNANCY TEST: CPT | Performed by: EMERGENCY MEDICINE

## 2017-08-21 PROCEDURE — 96374 THER/PROPH/DIAG INJ IV PUSH: CPT | Performed by: EMERGENCY MEDICINE

## 2017-08-21 PROCEDURE — 85025 COMPLETE CBC W/AUTO DIFF WBC: CPT | Performed by: EMERGENCY MEDICINE

## 2017-08-21 RX ORDER — SODIUM CHLORIDE 9 MG/ML
1000 INJECTION, SOLUTION INTRAVENOUS CONTINUOUS
Status: DISCONTINUED | OUTPATIENT
Start: 2017-08-21 | End: 2017-08-21 | Stop reason: HOSPADM

## 2017-08-21 RX ORDER — HYDROMORPHONE HYDROCHLORIDE 1 MG/ML
0.5 INJECTION, SOLUTION INTRAMUSCULAR; INTRAVENOUS; SUBCUTANEOUS
Status: DISCONTINUED | OUTPATIENT
Start: 2017-08-21 | End: 2017-08-21

## 2017-08-21 RX ORDER — LIDOCAINE 40 MG/G
CREAM TOPICAL
Status: DISCONTINUED | OUTPATIENT
Start: 2017-08-21 | End: 2017-08-21 | Stop reason: HOSPADM

## 2017-08-21 RX ORDER — ONDANSETRON 2 MG/ML
4 INJECTION INTRAMUSCULAR; INTRAVENOUS EVERY 30 MIN PRN
Status: DISCONTINUED | OUTPATIENT
Start: 2017-08-21 | End: 2017-08-21 | Stop reason: HOSPADM

## 2017-08-21 RX ADMIN — SODIUM CHLORIDE 1000 ML: 9 INJECTION, SOLUTION INTRAVENOUS at 19:22

## 2017-08-21 RX ADMIN — ONDANSETRON 4 MG: 2 INJECTION INTRAMUSCULAR; INTRAVENOUS at 19:23

## 2017-08-21 ASSESSMENT — ENCOUNTER SYMPTOMS
ABDOMINAL PAIN: 1
FEVER: 0
NAUSEA: 1
VOMITING: 1
DIARRHEA: 1
BLOOD IN STOOL: 1

## 2017-08-21 NOTE — ED AVS SNAPSHOT
[facility] Emergency Department    [address] [place] [address]    Phone:  [phone]    Fax:  [phone]                                       Katy Islas   MRN: [id number]    Department:  [facility] Emergency Department   Date of Visit:  8/21/2017           Patient Information     Date Of Birth          1990        Your diagnoses for this visit were:     Abdominal pain, left upper quadrant        You were seen by Juan Carlos Del Toro MD.      Follow-up Information     Follow up with Aura Rosario PA-C In 1 day.    Specialty:  Physician Assistant - Medical    Contact information:    [address]  [facility] [address]  [phone]          Discharge Instructions               Discharge References/Attachments     ABDOMINAL PAIN, UNKNOWN CAUSE, (FEMALE) (ENGLISH)      24 Hour Appointment Hotline       To make an appointment at any [facility], call [phone]). If you don't have a family doctor or clinic, we will help you find one. [facility] clinics are conveniently located to serve the needs of you and your family.             Review of your medicines      Our records show that you are taking the medicines listed below. If these are incorrect, please call your family doctor or clinic.        Dose / Directions Last dose taken    ARIPiprazole 5 MG tablet   Commonly known as:  ABILIFY   Dose:  5 mg   Quantity:  90 tablet        Take 1 tablet (5 mg) by mouth At Bedtime   Refills:  3        cetirizine 10 MG tablet   Commonly known as:  zyrTEC   Dose:  10 mg        Take 10 mg by mouth daily   Refills:  0        cyclobenzaprine 10 MG tablet   Commonly known as:  FLEXERIL   Dose:  20 mg   Quantity:  60 tablet        Take 2 tablets (20 mg) by mouth nightly as needed for muscle spasms or other (back pain)   Refills:  3        dexlansoprazole 60 MG Cpdr CR capsule   Commonly known as:  DEXILANT   Dose:  60 mg   Quantity:  90 capsule         Take 1 capsule (60 mg) by mouth daily   Refills:  3        diazepam 5 MG tablet   Commonly known as:  VALIUM   Dose:  2.5-5 mg   Quantity:  60 tablet        Take 0.5-1 tablets (2.5-5 mg) by mouth every 12 hours as needed for anxiety or sleep (MUSCLE SPASM) (one month supply)   Refills:  3        dicyclomine 20 MG tablet   Commonly known as:  BENTYL   Dose:  20 mg   Quantity:  30 tablet        Take 1 tablet (20 mg) by mouth 4 times daily as needed (ABDOMINAL CRAMPING) AS NEEDED FOR CRAMPING   Refills:  3        DULoxetine 60 MG EC capsule   Commonly known as:  CYMBALTA   Dose:  60 mg   Quantity:  90 capsule        Take 1 capsule (60 mg) by mouth daily   Refills:  3        EPINEPHrine 0.3 MG/0.3ML injection 2-pack   Commonly known as:  EPIPEN/ADRENACLICK/or ANY BX GENERIC EQUIV   Dose:  0.3 mg   Quantity:  0.6 mL        Inject 0.3 mLs (0.3 mg) into the muscle once as needed for anaphylaxis   Refills:  1        FLEX-A-MIN JOINT FLEX PO        Take by mouth daily Reported on 3/28/2017   Refills:  0        fluticasone 50 MCG/ACT spray   Commonly known as:  FLONASE   Dose:  2 spray   Quantity:  1 Bottle        Spray 2 sprays into both nostrils daily   Refills:  3        gabapentin 300 MG capsule   Commonly known as:  NEURONTIN   Dose:  300 mg   Quantity:  270 capsule        Take 1 capsule (300 mg) by mouth 3 times daily   Refills:  1        HYDROcodone-acetaminophen 5-325 MG per tablet   Commonly known as:  NORCO   Dose:  1 tablet   Quantity:  60 tablet        Take 1 tablet by mouth every 4 hours as needed for pain maximum 4 tablet(s) per day   Refills:  0        lidocaine 5 % Patch   Commonly known as:  LIDODERM   Quantity:  30 patch        Apply up to 3 patches to painful area at once for up to 12 h within a 24 h period.  Remove after 12 hours.   Refills:  3        meclizine 12.5 MG tablet   Commonly known as:  ANTIVERT   Dose:  12.5 mg   Quantity:  30 tablet        Take 1 tablet (12.5 mg) by mouth 4 times daily as  needed for dizziness   Refills:  0        medical cannabis inhalation (Patient's own supply.  Not a prescription)        Inhale into the lungs 3 times daily as needed Reported on 3/28/2017   Refills:  0        medroxyPROGESTERone 150 MG/ML injection   Commonly known as:  DEPO-PROVERA   Dose:  150 mg   Quantity:  3 mL        Inject 1 mL (150 mg) into the muscle every 3 months   Refills:  3        Multi-vitamin Tabs tablet   Dose:  1 tablet   Generic drug:  multivitamin, therapeutic with minerals        Take 1 tablet by mouth daily Reported on 3/28/2017   Refills:  0        ondansetron 4 MG ODT tab   Commonly known as:  ZOFRAN ODT   Dose:  4-8 mg   Quantity:  60 tablet        Take 1-2 tablets (4-8 mg) by mouth every 6 hours as needed for nausea Take 1-2 tablets by mouth as needed for nausea   Refills:  3        oxyCODONE 5 MG IR tablet   Commonly known as:  ROXICODONE   Dose:  5 mg   Quantity:  56 tablet        Take 1 tablet (5 mg) by mouth every 6 hours as needed for pain maximum 4 tablet(s) per day   Refills:  0        pantoprazole 40 MG EC tablet   Commonly known as:  PROTONIX   Dose:  40 mg   Quantity:  90 tablet        Take 1 tablet (40 mg) by mouth daily Take 30-60 minutes before a meal.   Refills:  3        promethazine 25 MG tablet   Commonly known as:  PHENERGAN   Dose:  25 mg   Quantity:  60 tablet        Take 1 tablet (25 mg) by mouth every 6 hours as needed for nausea   Refills:  3        rizatriptan 5 MG ODT tab   Commonly known as:  MAXALT-MLT   Dose:  5-10 mg   Quantity:  18 tablet        Take 1-2 tablets (5-10 mg) by mouth at onset of headache for migraine May repeat in 2 hours. Max 6 tablets/24 hours.   Refills:  3        senna-docusate 8.6-50 MG per tablet   Commonly known as:  SENOKOT-S;PERICOLACE   Dose:  1 tablet   Quantity:  60 tablet        Take 1 tablet by mouth 2 times daily as needed for constipation   Refills:  3        sucralfate 1 GM/10ML suspension   Commonly known as:  CARAFATE   Dose:   1 g   Quantity:  420 mL        Take 10 mLs (1 g) by mouth 4 times daily (before meals and nightly)   Refills:  3                Procedures and tests performed during your visit     CBC with platelets differential    Comprehensive metabolic panel    HCG qualitative urine    Lipase    Peripheral IV catheter    UA with Microscopic    Urine Drugs of Abuse Screen Panel 13      Orders Needing Specimen Collection     None      Pending Results     No orders found from 8/19/2017 to 8/22/2017.            Pending Culture Results     No orders found from 8/19/2017 to 8/22/2017.            Pending Results Instructions     If you had any lab results that were not finalized at the time of your Discharge, you can call the ED Lab Result RN at [phone]. You will be contacted by this team for any positive Lab results or changes in treatment. The nurses are available 7 days a week from 10A to 6:30P.  You can leave a message 24 hours per day and they will return your call.        Thank you for choosing [facility]       Thank you for choosing [facility] for your care. Our goal is always to provide you with excellent care. Hearing back from our patients is one way we can continue to improve our services. Please take a few minutes to complete the written survey that you may receive in the mail after you visit with us. Thank you!        [employer] Information     [employer] gives you secure access to your electronic health record. If you see a primary care provider, you can also send messages to your care team and make appointments. If you have questions, please call your primary care clinic.  If you do not have a primary care provider, please call [phone] and they will assist you.        Care EveryWhere ID     This is your Care EveryWhere ID. This could be used by other organizations to access your [facility] medical records  [id number]        Equal Access to Services     VIKY VILLANUEVA AH: mayank Thomas qaybta  susan buck ah. So [place] [phone].    ATENCIÓN: Si habla español, tiene a kerr disposición servicios gratuitos de asistencia lingüística. Llame al [phone].    We comply with applicable federal civil rights laws and [place] laws. We do not discriminate on the basis of race, color, national origin, age, disability sex, sexual orientation or gender identity.            After Visit Summary       This is your record. Keep this with you and show to your community pharmacist(s) and doctor(s) at your next visit.

## 2017-08-21 NOTE — ED PROVIDER NOTES
History     Chief Complaint   Patient presents with     Crohns     The history is provided by the patient.     Katy Islas is a 27 year old female who presents to the ED with concerns of a Crohn's flare up. She states that she has moderate left abdominal tenderness. She says that she has been feeling nauseated and has been having diarrhea with some blood present. The patient reports that this episode began yesterday. She denies having a fever. She endorses that she has had gallbladder surgery and a lumbar fusion but no surgery for Crohn's. She requests to have her hemoglobin level checked.     I have reviewed the Medications, Allergies, Past Medical and Surgical History, and Social History in the Epic system.    Patient Active Problem List   Diagnosis     Hearing loss     Allergic rhinitis     Migraine     Benign neoplasm of skin of lower limb, including hip     Dysmenorrhea     Crohn's colitis (H)     Fibromyalgia     Mild major depression (H)     Anxiety     Chronic pain     Oral thrush     Nausea and vomiting     Abdominal pain     Grand mal seizure disorder (H)     Health Care Home     Bipolar disorder (H)     Marijuana abuse     Adjustment disorder with mixed anxiety and depressed mood     Herpes simplex virus infection     Gastroesophageal reflux disease without esophagitis     Bee sting allergy     Mild persistent asthma without complication     Chronic bilateral low back pain with left-sided sciatica     Vitamin D deficiency     Atypical mole     Lumbar disc disease with radiculopathy     S/P lumbar fusion     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)     Upper GI bleed - suspected     Anemia due to blood loss, acute     Tobacco abuse     History of pseudoseizure     Past Medical History:   Diagnosis Date     Abdominal pain 10/31- 11/4/2005    [facility] admit for Crohn's     Allergic rhinitis, cause unspecified     Allergic rhinitis     Arthritis      C. difficile diarrhea     Past, no  current diarrhea.     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, without mention of intractable epilepsy     pseudoseizures diagnosed after extensive neurologic eval     Migraine 07/21/12    D/C 07/22/12-[facility]     Migraine, unspecified, without mention of intractable migraine without mention of status migrainosus     Migraine     Mild intermittent asthma     mild intermittent     Mycoplasma infection in conditions classified elsewhere and of unspecified site      Other chronic pain     Back pain for 6 years     Renal disease     Kidney stones     Unspecified hearing loss     congenital hearing loss     Past Surgical History:   Procedure Laterality Date     COLONOSCOPY  7/1/2009    [facility].     FUSION SPINE POSTERIOR MINIMALLY INVASIVE ONE LEVEL N/A 2/23/2017    Procedure: FUSION SPINE POSTERIOR MINIMALLY INVASIVE ONE LEVEL;  L4-5 Oblique Lateral Lumbar Interbody Fusion   Epidural steroid injection.   Transpedicular Bone marrow aspiration;  Surgeon: Jeniffer Eugene MD;  Location: [facility] OR     HC COLONOSCOPY THRU STOMA WITH BIOPSY  10/29/2003    Impression is that of normal appearing colonoscopy, without evidence of rectal bleeding.     HC COLONOSCOPY THRU STOMA, DIAGNOSTIC  10/00    normal     HC COLONOSCOPY THRU STOMA, DIAGNOSTIC  Oct 2009    Dr López- normal     HC EEG AWAKE AND SLEEP      abnormal     HC MRI BRAIN W/O CONTRAST  12/00    normal     HC REMOVAL GALLBLADDER  8/5/2009    [facility], [facility].     HC UGI ENDOSCOPY DIAG W BIOPSY  11/11/09    Normal esophagus     HC UGI  ENDOSCOPY, SIMPLE EXAM  7/00, 10/00    mild chronic esophagitis and duodenitis, neg H pylori     HC UGI ENDOSCOPY, SIMPLE EXAM  01/20/2005    Esophagogastroduodenoscopy, colonoscopy with biopsies.  [facility] UGI ENDOSCOPY, SIMPLE EXAM  7/1/2009    [facility] [place]      UGI ENDOSCOPY, SIMPLE EXAM  11/11/2009    attempted upper GI, pt. could not tolerate procedure:[facility]     ORTHOPEDIC SURGERY  October 19,2011    diskectomy L4-L5     Family History   Problem Relation Age of Onset     GASTROINTESTINAL DISEASE Brother      severe Crohn's     Neurologic Disorder Brother      Seizures post head injury     Depression Brother      Substance Abuse Brother      Genitourinary Problems Father      kidney stones     DIABETES Father      HEART DISEASE Father      Open heart surgery     Breast Cancer Maternal Grandmother      Parkinsonism Maternal Grandmother      CEREBROVASCULAR DISEASE Paternal Grandmother      CANCER Maternal Grandfather      Lung     Cardiovascular Paternal Grandfather      Heart Attack     Substance Abuse Mother      Social History   Substance Use Topics     Smoking status: Current Every Day Smoker     Packs/day: 0.25     Types: Cigarettes     Smokeless tobacco: Never Used     Alcohol use 0.6 oz/week     1 Cans of beer per week      Comment: 1-2 drinks dominique      Immunization History   Administered Date(s) Administered     DPT 1989, 1989, 1989, 07/20/1990, 08/11/1994     HPVQuadrivalent 01/07/2009, 04/28/2009, 07/10/2009     HepB-Peds 07/01/2000, 10/01/2000, 01/10/2001     Influenza (IIV3) 11/11/1999, 01/10/2001, 11/01/2006, 11/20/2007     MMR 07/15/1990, 06/27/2001     Mantoux 03/08/2010     Meningococcal (Menactra ) 11/01/2006     OPV 1989, 1989, 07/20/1990, 08/11/1994     TD (ADULT, 7+) 06/27/2001     TDAP Vaccine (Adacel) 08/16/2011     Allergies   Allergen Reactions     Ativan [Lorazepam] Hives     At the IV site      Baclofen      hives     Bees Hives, Swelling and Difficulty breathing     Caffeine      Contrast Dye      Hives,   Updated 5/10/2016 CT Contrast.     Diazepam      IV form makes her become aggressive and angry     Iodine Hives     Methocarbamol Swelling     Metoclopramide      Other reaction(s): Tremors  LW Reaction: shaking/sweating     Midazolam      Other reaction(s): Agitation     Monosodium Glutamate      Morphine Other (See Comments)     Difficulty with urination     Nsaids Other (See Comments)     GI BLEED x2     Other (Do Not Use) Other (See Comments)     Xanaflex- pt becomes disoriented and loses bladder control     Percocet [Oxycodone-Acetaminophen] Itching     Reglan [Metoclopramide Hcl] Other (See Comments)     shaking     Soma [Carisoprodol] Visual Disturbance     Sleep walking     Tizanidine      Topamax Other (See Comments)     Topamax [Topiramate] Nausea     Tingling  GI/Vomit     Tramadol      Severe Headache, Seizure Risk     Tylenol W/Codeine [Acetaminophen-Codeine] Nausea and Itching     Tylenol 3     Valium Other (See Comments)     Becomes aggressive and angry     Versed Other (See Comments)     Zolmitriptan      Makes face feel like its twitching     Droperidol Anxiety     Flu Virus Vaccine Rash      Arm swelling     Current Outpatient Prescriptions   Medication Sig Dispense Refill     ondansetron (ZOFRAN ODT) 4 MG ODT tab Take 1-2 tablets (4-8 mg) by mouth every 6 hours as needed for nausea Take 1-2 tablets by mouth as needed for nausea 60 tablet 3     HYDROcodone-acetaminophen (NORCO) 5-325 MG per tablet Take 1 tablet by mouth every 4 hours as needed for pain maximum 4 tablet(s) per day 60 tablet 0     diazepam (VALIUM) 5 MG tablet Take 0.5-1 tablets (2.5-5 mg) by mouth every 12 hours as needed for anxiety or sleep (MUSCLE SPASM) (one month supply) 60 tablet 3     cyclobenzaprine (FLEXERIL) 10 MG tablet Take 2 tablets (20 mg) by mouth nightly as needed for muscle spasms or other (back  pain) 60 tablet 3     rizatriptan (MAXALT-MLT) 5 MG ODT tab Take 1-2 tablets (5-10 mg) by mouth at onset of headache for migraine May repeat in 2 hours. Max 6 tablets/24 hours. 18 tablet 3     medroxyPROGESTERone (DEPO-PROVERA) 150 MG/ML injection Inject 1 mL (150 mg) into the muscle every 3 months 3 mL 3     oxyCODONE (ROXICODONE) 5 MG IR tablet Take 1 tablet (5 mg) by mouth every 6 hours as needed for pain maximum 4 tablet(s) per day 56 tablet 0     ARIPiprazole (ABILIFY) 5 MG tablet Take 1 tablet (5 mg) by mouth At Bedtime 90 tablet 3     sucralfate (CARAFATE) 1 GM/10ML suspension Take 10 mLs (1 g) by mouth 4 times daily (before meals and nightly) 420 mL 3     cetirizine (ZYRTEC) 10 MG tablet Take 10 mg by mouth daily       dicyclomine (BENTYL) 20 MG tablet Take 1 tablet (20 mg) by mouth 4 times daily as needed (ABDOMINAL CRAMPING) AS NEEDED FOR CRAMPING 30 tablet 3     promethazine (PHENERGAN) 25 MG tablet Take 1 tablet (25 mg) by mouth every 6 hours as needed for nausea 60 tablet 3     pantoprazole (PROTONIX) 40 MG EC tablet Take 1 tablet (40 mg) by mouth daily Take 30-60 minutes before a meal. 90 tablet 3     dexlansoprazole (DEXILANT) 60 MG CPDR CR capsule Take 1 capsule (60 mg) by mouth daily 90 capsule 3     gabapentin (NEURONTIN) 300 MG capsule Take 1 capsule (300 mg) by mouth 3 times daily 270 capsule 1     lidocaine (LIDODERM) 5 % Patch Apply up to 3 patches to painful area at once for up to 12 h within a 24 h period.  Remove after 12 hours. 30 patch 3     senna-docusate (SENOKOT-S;PERICOLACE) 8.6-50 MG per tablet Take 1 tablet by mouth 2 times daily as needed for constipation 60 tablet 3     meclizine (ANTIVERT) 12.5 MG tablet Take 1 tablet (12.5 mg) by mouth 4 times daily as needed for dizziness 30 tablet 0     fluticasone (FLONASE) 50 MCG/ACT spray Spray 2 sprays into both nostrils daily 1 Bottle 3     medical cannabis inhalation (Patient's own supply.  Not a prescription) Inhale into the lungs 3  times daily as needed Reported on 3/28/2017       DULoxetine (CYMBALTA) 60 MG EC capsule Take 1 capsule (60 mg) by mouth daily 90 capsule 3     EPINEPHrine (EPIPEN) 0.3 MG/0.3ML injection Inject 0.3 mLs (0.3 mg) into the muscle once as needed for anaphylaxis 0.6 mL 1     Misc Natural Products (FLEX-A-MIN JOINT FLEX PO) Take by mouth daily Reported on 3/28/2017       Multiple Vitamin (MULTI-VITAMIN) per tablet Take 1 tablet by mouth daily Reported on 3/28/2017       Review of Systems   Constitutional: Negative for fever.   Gastrointestinal: Positive for abdominal pain, blood in stool, diarrhea, nausea and vomiting.   All other systems reviewed and are negative.    Physical Exam   BP: 134/89  Pulse: 108  Temp: 98.6  F (37  C)  Resp: 16  Weight: 71.4 kg (157 lb 8 oz)  SpO2: 98 %  Physical Exam   Constitutional: She is oriented to person, place, and time. She appears well-developed and well-nourished.   HENT:   Head: Atraumatic.   Eyes: EOM are normal.   Neck: Neck supple.   Cardiovascular: Regular rhythm and normal heart sounds.  Tachycardia present.    Pulmonary/Chest: Effort normal and breath sounds normal.   Abdominal: Soft. Bowel sounds are normal. There is tenderness in the epigastric area, left upper quadrant and left lower quadrant.   Musculoskeletal: Normal range of motion.   Neurological: She is alert and oriented to person, place, and time.   Skin: Skin is warm and dry.   Psychiatric: She has a normal mood and affect. Her behavior is normal.   Nursing note and vitals reviewed.    ED Course     ED Course     Procedures             Critical Care time:  none          Results for orders placed or performed during the hospital encounter of 08/21/17 (from the past 24 hour(s))   CBC with platelets differential   Result Value Ref Range    WBC 6.0 4.0 - 11.0 10e9/L    RBC Count 4.35 3.8 - 5.2 10e12/L    Hemoglobin 14.1 11.7 - 15.7 g/dL    Hematocrit 41.3 35.0 - 47.0 %    MCV 95 78 - 100 fl    MCH 32.4 26.5 - 33.0 pg     MCHC 34.1 31.5 - 36.5 g/dL    RDW 11.9 10.0 - 15.0 %    Platelet Count 318 150 - 450 10e9/L    Diff Method Automated Method     % Neutrophils 81.6 %    % Lymphocytes 12.3 %    % Monocytes 5.7 %    % Eosinophils 0.0 %    % Basophils 0.2 %    % Immature Granulocytes 0.2 %    Absolute Neutrophil 4.9 1.6 - 8.3 10e9/L    Absolute Lymphocytes 0.7 (L) 0.8 - 5.3 10e9/L    Absolute Monocytes 0.3 0.0 - 1.3 10e9/L    Absolute Eosinophils 0.0 0.0 - 0.7 10e9/L    Absolute Basophils 0.0 0.0 - 0.2 10e9/L    Abs Immature Granulocytes 0.0 0 - 0.4 10e9/L   Comprehensive metabolic panel   Result Value Ref Range    Sodium 140 133 - 144 mmol/L    Potassium 3.8 3.4 - 5.3 mmol/L    Chloride 103 94 - 109 mmol/L    Carbon Dioxide 26 20 - 32 mmol/L    Anion Gap 11 3 - 14 mmol/L    Glucose 91 70 - 99 mg/dL    Urea Nitrogen 14 7 - 30 mg/dL    Creatinine 0.75 0.52 - 1.04 mg/dL    GFR Estimate >90 >60 mL/min/1.7m2    GFR Estimate If Black >90 >60 mL/min/1.7m2    Calcium 9.3 8.5 - 10.1 mg/dL    Bilirubin Total 0.3 0.2 - 1.3 mg/dL    Albumin 3.9 3.4 - 5.0 g/dL    Protein Total 8.2 6.8 - 8.8 g/dL    Alkaline Phosphatase 113 40 - 150 U/L    ALT 47 0 - 50 U/L    AST 19 0 - 45 U/L   Lipase   Result Value Ref Range    Lipase 76 73 - 393 U/L   UA with Microscopic   Result Value Ref Range    Color Urine Yellow     Appearance Urine Clear     Glucose Urine Negative NEG^Negative mg/dL    Bilirubin Urine Negative NEG^Negative    Ketones Urine Negative NEG^Negative mg/dL    Specific Gravity Urine 1.015 1.003 - 1.035    Blood Urine Negative NEG^Negative    pH Urine 6.0 5.0 - 7.0 pH    Protein Albumin Urine Negative NEG^Negative mg/dL    Urobilinogen mg/dL 0.0 0.0 - 2.0 mg/dL    Nitrite Urine Negative NEG^Negative    Leukocyte Esterase Urine Negative NEG^Negative    Source Unspecified Urine     WBC Urine 1 0 - 2 /HPF    RBC Urine 0 0 - 2 /HPF    Squamous Epithelial /HPF Urine 1 0 - 1 /HPF    Mucous Urine Present (A) NEG^Negative /LPF   HCG qualitative urine    Result Value Ref Range    HCG Qual Urine Negative NEG^Negative   Urine Drugs of Abuse Screen Panel 13   Result Value Ref Range    Cannabinoids (24-kzs-7-carboxy-9-THC) Detected, Abnormal Result (A) NDET^Not Detected ng/mL    Phencyclidine (Phencyclidine) Not Detected NDET^Not Detected ng/mL    Cocaine (Benzoylecgonine) Not Detected NDET^Not Detected ng/mL    Methamphetamine (d-Methamphetamine) Not Detected NDET^Not Detected ng/mL    Opiates (Morphine) Detected, Abnormal Result (A) NDET^Not Detected ng/mL    Amphetamine (d-Amphetamine) Not Detected NDET^Not Detected ng/mL    Benzodiazepines (Nordiazepam) Detected, Abnormal Result (A) NDET^Not Detected ng/mL    Tricyclic Antidepressants (Desipramine) Not Detected NDET^Not Detected ng/mL    Methadone (Methadone) Not Detected NDET^Not Detected ng/mL    Barbiturates (Butalbital) Not Detected NDET^Not Detected ng/mL    Oxycodone (Oxycodone) Not Detected NDET^Not Detected ng/mL    Propoxyphene (Norpropoxyphene) Not Detected NDET^Not Detected ng/mL    Buprenorphine (Buprenorphine) Not Detected NDET^Not Detected ng/mL     *Note: Due to a large number of results and/or encounters for the requested time period, some results have not been displayed. A complete set of results can be found in Results Review.       Medications   lidocaine 1 % 1 mL (not administered)   lidocaine (LMX4) kit (not administered)   sodium chloride (PF) 0.9% PF flush 3 mL (not administered)   sodium chloride (PF) 0.9% PF flush 3 mL (not administered)   0.9% sodium chloride BOLUS (0 mLs Intravenous Stopped 8/21/17 2008)     Followed by   0.9% sodium chloride infusion (not administered)   ondansetron (ZOFRAN) injection 4 mg (4 mg Intravenous Given 8/21/17 1923)     Assessments & Plan (with Medical Decision Making)  27-year-old female with known Crohn's disease who presents with concerns for significant blood per stool.  Hemoglobin at 14.1 is the best it's been for her and her record of recent.  White  count normal.  Nonsurgical abdomen.  She complains of pain but has long-standing significant opiate issues documented.  She just had 60 hydrocodone filled by her primary care 6 days ago.  She also has a documented pain contract through her primary care in previous notes here.  In addition I showed her the 4 pages of controlled substances she has had filled in the last year.  I explained to her that I was not comfortable filling any further prescriptions.  She stated she understood this.  She was discharged in stable condition.  She should follow-up with her primary care if has continued pain or concern.       I have reviewed the nursing notes.    I have reviewed the findings, diagnosis, plan and need for follow up with the patient.       Discharge Medication List as of 8/21/2017  8:46 PM          Final diagnoses:   Abdominal pain, left upper quadrant     This document serves as a record of services personally performed by Juan Carlos Del Toro MD. It was created on their behalf by Noris Giang, a trained medical scribe. The creation of this record is based on the provider's personal observations and the statements of the patient. This document has been checked and approved by the attending provider.    Note: Chart documentation done in part with Dragon Voice Recognition software. Although reviewed after completion, some word and grammatical errors may remain.    8/21/2017   [facility] EMERGENCY DEPARTMENT     Juan Carlos Del Toro MD  08/21/17 3140

## 2017-08-21 NOTE — ED AVS SNAPSHOT
[facility] Emergency Department    [address] [place] [address]    Phone:  [phone]    Fax:  [phone]                                       Katy Islas   MRN: [id number]    Department:  [facility] Emergency Department   Date of Visit:  8/21/2017           After Visit Summary Signature Page     I have received my discharge instructions, and my questions have been answered. I have discussed any challenges I see with this plan with the nurse or doctor.    ..........................................................................................................................................  Patient/Patient Representative Signature      ..........................................................................................................................................  Patient Representative Print Name and Relationship to Patient    ..................................................               ................................................  Date                                            Time    ..........................................................................................................................................  Reviewed by Signature/Title    ...................................................              ..............................................  Date                                                            Time

## 2017-08-21 NOTE — ED NOTES
Pt presents with abd pain, nausea, vomiting, bloody diarrhea for the past two days.  Pt states she has had to be admitted to hospital for low hemoglobin due to bloody diarrhea.

## 2017-08-22 NOTE — ED NOTES
Per Video interp.  Pt w/hx of Crohn's disease has had n/v/-bloody diarrhea x 2 days.  She has abd pain just prior to passing stool and also even w/o stool.  Pt has tender abd at epigastrium and left side.   /89  Pulse 108  Temp 98.6  F (37  C) (Temporal)  Resp 16  Wt 71.4 kg (157 lb 8 oz)  LMP 07/22/2017  SpO2 98%  BMI 28.81 kg/m2

## 2017-08-22 NOTE — ED NOTES
Report received from Kami REDDY.  Introduced self to pt.  Pt up to the bathroom, urine sample collected and sent to lab.  Pt requesting Dilaudid.  Updated provider, no new orders at this time.  Administered Zofran and fluids started.

## 2017-08-22 NOTE — ED NOTES
Patient on the call light. She is requesting something for her abdominal cramping. She also suggested giving a stool sample because she is having diarrhea.

## 2017-08-23 ENCOUNTER — MYC MEDICAL ADVICE (OUTPATIENT)
Dept: FAMILY MEDICINE | Facility: OTHER | Age: 27
End: 2017-08-23

## 2017-08-23 NOTE — PROGRESS NOTES
"  SUBJECTIVE:                                                    Katy Islas is a 27 year old female who presents to clinic today for the following health issues:    HPI    Follow up from lesion consult on 8/3/17  Would like moles checked on back, states there is another concerning mole on her back and that the ones that were removed are \"Growing back\"  - was removed age 11 or 12, growing back, right lower leg   - Center lower back, new lesion        Back Pain Follow Up      Description:   Location of pain:  left  Character of pain: dull ache and cramping  Pain radiation: Does not radiate and radiates into the left buttocks  Since last visit, pain is:  unchanged  New numbness or weakness in legs, not attributed to pain:  no     Intensity: Currently 4/10    History:   Pain interferes with job: patient reports the pain limits what she is able to do   Therapies tried without relief: none  Therapies tried with relief: heat     - 4 per day pain medication makes manageable pain   - Back doctor on Sept 5th to discuss her tailbone, might need to have it removed   - Slight improvement   - Continues to exercise and strengthen  - Moving to [place] probably in the next month         Lesion removal plan   9. Mole  - Recommend due to history of moles with atypia that we remove 2 further moles   - Discussed risks and benefits of procedure including bleeding, infection, and scarring   - Recommend procedure as follows:          1) Right lower leg - 6 mm punch biopsy, with sutures and removal in 10-14 days           2) Center low back  - 6 mm punch biopsy, with sutures and removal in 10-14 days         Supplies: 1% lidocaine with epi, 2 alcohol wipes, chloraprep, biopsy blade - 6 mm punch, lac pack, Sutures: 3-0 Ethilon, sterile 4x4 pack, 2 - sterile 2x2, 2 - small tegaderm      Pain plan from last visit on 8/3/17  1-4. Back pain    - S/p lumbar fusion on 2/24/17 and sacral injection on 7/24/17  - Continues to follow with  " [facility]ana and [facility], planning 2 further injections   - Due to migraines, will STOP oxycodone and start Norco. Discussed risks of tylenol use (but not the same as NSAIDs which she should continue to avoid due to recent suspected GI bleed). Reviewed medication use and side effects  - Norco 5-325 mg, 4/day  - Discussed I recommend staying on Oxycodone and using the Gabapentin and Valium to get her off it, but she states the other two make her too sleepy all the time, she is frustrated and tired of her fatigue, grogginess, and migraines   - Recheck every 2 weeks   - Continue to follow with specialist   - Continue Gabapentin, Flexeril, and Valium as needed       Problem list and histories reviewed & adjusted, as indicated.  Additional history: as documented    ROS:  Constitutional, HEENT, cardiovascular, pulmonary, gi and gu systems are negative, except as otherwise noted.      OBJECTIVE:   /83 (BP Location: Left arm, Patient Position: Chair, Cuff Size: Adult Regular)  Pulse 96  Temp 99.8  F (37.7  C) (Oral)  Resp 18  Wt 154 lb (69.9 kg)  SpO2 100%  BMI 28.17 kg/m2  Body mass index is 28.17 kg/(m^2).  GENERAL APPEARANCE: healthy, alert and no distress  EYES: Eyes grossly normal to inspection, PERRLA, conjunctivae and sclerae without injection or discharge, EOM intact   MS: No musculoskeletal defects are noted and gait is age appropriate without ataxia   SKIN: Right lower leg - 0.4x 0.4 oval skin lesion with dark brown center and light brown halo with irregular borders, extends around sides of previous scar, center low back - 0.5 x 0.6 oval-circular irregular shape, dark brown/purple/black color with irregular borders, No other suspicious lesions or rashes, hydration status appears adeuqate with normal skin turgor     Diagnostic Test Results:  Surg path - pending     Progress Note:  Pause for the cause has been completed prior to the prceedure.   1. Patient was identified by both name and date of birth   2. The  correct site was identified   3. Site was marked by provider    4. Written informed consent correct and signed or verbal authorization  to proceed was obtained   5. Verifed necessary supplies, equipment, and diagnostics are available    6. Time out was performed immediately prior to procedure    Objective: The lesion(s) is/are of the above mentioned size and location and is/are atypical.     1) Right lower leg - The area was prepped using alcohol swabs and appropriately anesthetized using 1 cc of 1% lidocaine with epi. Using the usual technique, punch biopsy size 6 mm was performed. Hemostasis was obtained using 2 single interrupted sutures of 3-0 ethilon. An appropriate dressing was applied. The procedure was well tolerated and without complications.    1) Center low back - The area was prepped using alcohol swabs and appropriately anesthetized using 2 cc of 1% lidocaine with epi. Using the usual technique, punch biopsy size 6 mm was performed. Hemostasis was obtained using 2 single interrupted sutures of 3-0 ethilon. An appropriate dressing was applied. The procedure was well tolerated and without complications.      ASSESSMENT/PLAN:       ICD-10-CM    1. Atypical mole D22.9 Surgical pathology exam     BIOPSY SKIN/SUBQ/MUC MEM, SINGLE LESION     BIOPSY SKIN/SUBQ/MUC MEM, EACH ADDTL LESION   2. Compound nevus of back D23.5 Surgical pathology exam     BIOPSY SKIN/SUBQ/MUC MEM, SINGLE LESION     BIOPSY SKIN/SUBQ/MUC MEM, EACH ADDTL LESION   3. S/P lumbar fusion Z98.1 HYDROcodone-acetaminophen (NORCO) 5-325 MG per tablet   4. Chronic bilateral low back pain with left-sided sciatica M54.42 HYDROcodone-acetaminophen (NORCO) 5-325 MG per tablet    G89.29    5. Chronic pain syndrome G89.4 HYDROcodone-acetaminophen (NORCO) 5-325 MG per tablet   6. Lumbar disc disease with radiculopathy M51.16 HYDROcodone-acetaminophen (NORCO) 5-325 MG per tablet   7. Dizziness R42 meclizine (ANTIVERT) 12.5 MG tablet     1. Moles   -  Previous moles where dysplastic compound nevus with mild atypia   - Procedure as above   - Suture removal in 10-14 days   - Discussed wound care and hand out given       2-4. Pain     - S/p lumbar fusion on 2/24/17 and sacral injection on 7/24/17  - Stable on Norco 4/day  - Continues to follow with neurosurgery, next recheck 9/5/17  - Refilled norco, 4 per day for 2 weeks   - Continue Gabapentin, Flexeril, and Valium   - Recheck in 2 weeks   - CSA 4/25/17, Utox 6/19/17,  reviewed today, as expected   - Patient since 7/20/16, Activity - Moderate, Adherence - Moderate, analgesia - Good, adverse effects - Constipation, using Senna     The patient indicates understanding of these issues and agrees with the plan.    Follow up: 2 weeks       Due to language barrier, an  was present during the history-taking and subsequent discussion (and for part of the physical exam) with this patient.      Aura Rosario PA-C  [facility]

## 2017-08-28 ENCOUNTER — OFFICE VISIT (OUTPATIENT)
Dept: FAMILY MEDICINE | Facility: OTHER | Age: 27
End: 2017-08-28
Payer: MEDICARE

## 2017-08-28 VITALS
RESPIRATION RATE: 18 BRPM | WEIGHT: 154 LBS | OXYGEN SATURATION: 100 % | DIASTOLIC BLOOD PRESSURE: 83 MMHG | HEART RATE: 96 BPM | SYSTOLIC BLOOD PRESSURE: 119 MMHG | TEMPERATURE: 99.8 F | BODY MASS INDEX: 28.17 KG/M2

## 2017-08-28 DIAGNOSIS — G89.4 CHRONIC PAIN SYNDROME: ICD-10-CM

## 2017-08-28 DIAGNOSIS — G89.29 CHRONIC BILATERAL LOW BACK PAIN WITH LEFT-SIDED SCIATICA: ICD-10-CM

## 2017-08-28 DIAGNOSIS — D22.9 ATYPICAL MOLE: Primary | ICD-10-CM

## 2017-08-28 DIAGNOSIS — Z98.1 S/P LUMBAR FUSION: ICD-10-CM

## 2017-08-28 DIAGNOSIS — M54.42 CHRONIC BILATERAL LOW BACK PAIN WITH LEFT-SIDED SCIATICA: ICD-10-CM

## 2017-08-28 DIAGNOSIS — M51.16 LUMBAR DISC DISEASE WITH RADICULOPATHY: ICD-10-CM

## 2017-08-28 DIAGNOSIS — R42 DIZZINESS: ICD-10-CM

## 2017-08-28 DIAGNOSIS — D22.5 COMPOUND NEVUS OF BACK: ICD-10-CM

## 2017-08-28 PROCEDURE — 88305 TISSUE EXAM BY PATHOLOGIST: CPT | Mod: 26 | Performed by: PHYSICIAN ASSISTANT

## 2017-08-28 PROCEDURE — 88305 TISSUE EXAM BY PATHOLOGIST: CPT | Performed by: PHYSICIAN ASSISTANT

## 2017-08-28 PROCEDURE — 88342 IMHCHEM/IMCYTCHM 1ST ANTB: CPT | Performed by: PHYSICIAN ASSISTANT

## 2017-08-28 PROCEDURE — 11100 HC BIOPSY SKIN/SUBQ/MUC MEM, SINGLE LESION: CPT | Performed by: PHYSICIAN ASSISTANT

## 2017-08-28 PROCEDURE — 11101 HC BIOPSY SKIN/SUBQ/MUC MEM, EACH ADDTL LESION: CPT | Performed by: PHYSICIAN ASSISTANT

## 2017-08-28 PROCEDURE — 99214 OFFICE O/P EST MOD 30 MIN: CPT | Mod: 25 | Performed by: PHYSICIAN ASSISTANT

## 2017-08-28 PROCEDURE — 88342 IMHCHEM/IMCYTCHM 1ST ANTB: CPT | Mod: 26 | Performed by: PHYSICIAN ASSISTANT

## 2017-08-28 RX ORDER — HYDROCODONE BITARTRATE AND ACETAMINOPHEN 5; 325 MG/1; MG/1
1 TABLET ORAL EVERY 4 HOURS PRN
Qty: 60 TABLET | Refills: 0 | Status: SHIPPED | OUTPATIENT
Start: 2017-08-28 | End: 2017-09-07

## 2017-08-28 RX ORDER — MECLIZINE HCL 12.5 MG 12.5 MG/1
12.5 TABLET ORAL 4 TIMES DAILY PRN
Qty: 30 TABLET | Refills: 3 | Status: SHIPPED | OUTPATIENT
Start: 2017-08-28 | End: 2017-11-18

## 2017-08-28 ASSESSMENT — ANXIETY QUESTIONNAIRES
1. FEELING NERVOUS, ANXIOUS, OR ON EDGE: SEVERAL DAYS
5. BEING SO RESTLESS THAT IT IS HARD TO SIT STILL: SEVERAL DAYS
2. NOT BEING ABLE TO STOP OR CONTROL WORRYING: SEVERAL DAYS
7. FEELING AFRAID AS IF SOMETHING AWFUL MIGHT HAPPEN: NOT AT ALL
6. BECOMING EASILY ANNOYED OR IRRITABLE: SEVERAL DAYS
GAD7 TOTAL SCORE: 6
GAD7 TOTAL SCORE: 6
7. FEELING AFRAID AS IF SOMETHING AWFUL MIGHT HAPPEN: NOT AT ALL
3. WORRYING TOO MUCH ABOUT DIFFERENT THINGS: SEVERAL DAYS
GAD7 TOTAL SCORE: 6
4. TROUBLE RELAXING: SEVERAL DAYS

## 2017-08-28 ASSESSMENT — PATIENT HEALTH QUESTIONNAIRE - PHQ9
SUM OF ALL RESPONSES TO PHQ QUESTIONS 1-9: 2
SUM OF ALL RESPONSES TO PHQ QUESTIONS 1-9: 2
10. IF YOU CHECKED OFF ANY PROBLEMS, HOW DIFFICULT HAVE THESE PROBLEMS MADE IT FOR YOU TO DO YOUR WORK, TAKE CARE OF THINGS AT HOME, OR GET ALONG WITH OTHER PEOPLE: SOMEWHAT DIFFICULT

## 2017-08-28 NOTE — MR AVS SNAPSHOT
After Visit Summary   8/28/2017    Katy Islas    MRN: [id number]           Patient Information     Date Of Birth          1990        Visit Information        Provider Department      8/28/2017 11:30 AM Aura Rosario PA-C; [facility]        Today's Diagnoses     Atypical mole    -  1    Compound nevus of back        S/P lumbar fusion        Chronic bilateral low back pain with left-sided sciatica        Chronic pain syndrome        Lumbar disc disease with radiculopathy        Dizziness          Care Instructions    - Suture removal in 10-14 days   - Pain recheck 1 month     Wound Care Instructions    1.  Keep area dry today.    2.  Starting tomorrow wash gently with soap and water once daily.      3.  Apply Vaseline or antibiotic ointment to the area and cover with a bandage if desired.  Do not let it dry out and form a scab as this will make the resulting scar more noticeable.    4. Protect the area from sun for up to one year afterward as the scar is continuing to remodel.  Sun exposure will also make the resulting scar more noticeable.    5.  Call if the area is very red, tender, has a discharge or is very itchy while healing, or if you have any other questions.  These may be signs of early infection or allergy.                         Wound Closure and Wound Care  What is wound closure?   Wounds heal more quickly and with less risk of infection and scarring when the wound is cleaned and the wound edges are held together (closed). Scrapes, scratches, puncture wounds, and shallow cuts may need only cleaning, ointment, and a bandage. Some cuts may need to be closed with tape strips called Steri-Strips or tissue adhesive liquid (skin glue). If a cut or surgical incision is deep, very long, jagged, or under a lot of tension (such as a cut over a joint), stitches (also called sutures) or staples may be needed to close the wound.   How do I take care of  my wound and sutures?   If you get an accidental cut, put pressure on the wound with a gauze pad or clean cloth right away to stop the bleeding. Then gently but thoroughly wash it with soap and cool water. Soapy water can be used around, but not in the cut. Try to remove all dirt and debris but do not scrub vigorously. If you decide to get medical treatment, cover the wound and apply pressure as needed to control bleeding while traveling to your healthcare provider's office, urgent care clinic, or emergency room.   After a wound is closed by your healthcare provider, the wound and the area around it must be kept clean and dry. The care of a stapled wound is similar to the care of a sutured wound. There are minor differences in caring for a wound closed with skin glue.   Do not let a wound closed with stitches or staples get wet for the first 24 hours. After 24 hours, you can shower or you can clean the wound with hydrogen peroxide or gently wash it with soap and warm water twice a day.   If your wound was closed with skin glue, keep the wound dry for the first 4 hours after the skin glue was put on. After the first 4 hours, you may occasionally and briefly wet the wound in the shower. You can clean the wound with hydrogen peroxide or gently wash it with soap and water twice a day.   If your wound is closed with Steri-Strips, they may be more likely to separate if they get wet. Keep them dry for the first few days while you're in the shower or bath.   Do not soak or scrub the wound. Do not take a bath, go swimming, or use a hot tub.   If recommended by your healthcare provider, you may put a small amount of antibiotic ointment on the wound each time you clean it. This can prevent infection. It will also help keep bandages from sticking to the wound. If a rash appears, stop using the ointment. If your wound is closed with skin glue, do not put liquid, antibiotic ointment, or any other product on the wound while the  "adhesive is in place. It may loosen the film before the wound is healed.   Make sure the wound is kept dry between washings. After showering or bathing, gently pat the wound dry with a soft towel.   Your healthcare provider may recommend that you cover the wound with gauze or a new, bandage to keep it from getting dirty. Be sure to keep the bandage dry. Put on a new bandage after cleaning the wound of if the old one gets dirty or wet.   Your healthcare provider may recommend leaving the wound \"open to air\" and not covered by a bandage while you sleep to help speed up the healing process. If the wound was closed with skin glue, you do not need a bandage.   For the first 1 or 2 days keep the area propped up higher than your heart. This will help lessen your pain and any swelling.   Protect the wound from repeat injury until the skin has had time to heal.   Protect the wound from a lot of exposure to sunlight or tanning lamps while skin glue is in place. Wounds exposed to the sun can become red, while scars that have not been exposed to the sun usually turn white after a period of time.   Do not scratch, rub, or pick at your stitches, staples, or skin glue. This may cause them to loosen before the wound is healed.   Avoid activities that will make you sweat a lot until the skin glue has naturally fallen off or the stitches or staples have been removed.   Any wound can become infected. When you are cleaning your wound, look for these signs of infection:   increased redness   red streaks   increased swelling   increased pain or tenderness   pus or other drainage   warmth in the area of the wound   fever.   Contact your provider if you see any signs of infection.   If your wound was accidental, be sure to ask if a tetanus booster is needed. Treatment of accidental wounds may include taking an oral antibiotic to help prevent infection. Be sure to take the medicine until it is completely gone. Do not stop taking it just " because the wound looks like it is healing well.   When are stitches, staples, or other types of wound closures removed?   Steri-Strips are usually left on until they fall off. If they have not fallen off after 2 weeks, they should be removed. Skin glue usually falls off on its own in 5 to 10 days.   For deep cuts the first stitches are placed under the skin. These stitches are made of materials that dissolve and do not need to be removed. Sutures or staples on the surface of the skin need to be removed by your healthcare provider 5 to 14 days after they are put in. The length of time depends on where the cut is. Sutures in wounds on the face usually can be removed after 5 to 7 days. In areas of high stress, such as hands, knees, or elbows, the sutures must stay in 10 to 14 days. Your provider will tell you when you should come to the office for removal of your sutures or staples. Do NOT remove sutures or staples yourself unless your provider instructs you to do so. Staples are removed using a special tool. If you don't have the tool, don't try to remove the staples.   When should I call my healthcare provider?   Some swelling, redness, and pain are common with all wounds and normally go away as the wound heals.   Call your provider right away if:   You start to have any signs or symptoms of infection. These include:   Your skin is redder or more painful.   You have red streaks from the wound going toward your heart.   The wound area is very warm to touch.   You have pus or other fluid coming from the wound area.   You have a fever higher than 101.5? F (38.6? C).   You have chills, nausea, vomiting, or muscle aches.   The wound seems to be opening up or you notice any drainage.   The wound bleeds for more than 10 minutes.   The stitches or staples are loose.   The skin glue is loosening before it is supposed to.   You have any symptoms that worry you.     Published by [employer].  This content is reviewed  periodically and is subject to change as new health information becomes available. The information is intended to inform and educate and is not a replacement for medical evaluation, advice, diagnosis or treatment by a healthcare professional.   Written by Quirino Mcnally MD.   ? 2010 [facility] and/or its affiliates. All Rights Reserved.   Copyright   [employer] 2011                Follow-ups after your visit        Your next 10 appointments already scheduled     Sep 11, 2017  4:00 PM CDT   Office Visit with Aura Rosario PA-C   [facility] ([facility])    [address]  [place] [place] [facility] [address]   [phone]           Bring a current list of meds and any records pertaining to this visit. For Physicals, please bring immunization records and any forms needing to be filled out. Please arrive 10 minutes early to complete paperwork.              Who to contact     If you have questions or need follow up information about today's clinic visit or your schedule please contact [facility] directly at [phone].  Normal or non-critical lab and imaging results will be communicated to you by AudioCaseFileshart, letter or phone within 4 business days after the clinic has received the results. If you do not hear from us within 7 days, please contact the clinic through [employer] or phone. If you have a critical or abnormal lab result, we will notify you by phone as soon as possible.  Submit refill requests through [employer] or call your pharmacy and they will forward the refill request to us. Please allow 3 business days for your refill to be completed.          Additional Information About Your Visit        AudioCaseFiles[employer] Information     [employer] gives you secure access to your electronic health record. If you see a primary care provider, you can also send messages to your care team and make appointments. If you have questions, please call your primary care  clinic.  If you do not have a primary care provider, please call [phone] and they will assist you.        Care EveryWhere ID     This is your Care EveryWhere ID. This could be used by other organizations to access your [facility] medical records  [id number]        Your Vitals Were     Pulse Temperature Respirations Pulse Oximetry BMI (Body Mass Index)       96 99.8  F (37.7  C) (Oral) 18 100% 28.17 kg/m2        Blood Pressure from Last 3 Encounters:   08/28/17 119/83   08/21/17 139/90   08/10/17 120/79    Weight from Last 3 Encounters:   08/28/17 154 lb (69.9 kg)   08/21/17 157 lb 8 oz (71.4 kg)   08/10/17 145 lb (65.8 kg)              We Performed the Following     BIOPSY SKIN/SUBQ/MUC MEM, EACH ADDTL LESION     BIOPSY SKIN/SUBQ/MUC MEM, SINGLE LESION     Surgical pathology exam          Where to get your medicines      These medications [place] [facility] [place] - [address]  [address], [facility] [address]     Phone:  [phone]     meclizine 12.5 MG tablet         Some of these will need a paper prescription and others can be bought over the counter.  Ask your nurse if you have questions.     Bring a paper prescription for each of these medications     HYDROcodone-acetaminophen 5-325 MG per tablet          Primary Care Provider Office Phone # Fax #    Aura EMILY Rosario PA-C [phone]       [address]  [place] [address]        Equal Access to Services     PRATIK VILLANUEVA : Ruth redmond Soyvette, waaxda luqadaha, qaybta kaalmada susan ragland. So [facility] [phone].    ATENCIÓN: Si habla español, tiene a kerr disposición servicios gratuitos de asistencia lingüística. Llame al [phone].    We comply with applicable federal civil rights laws and [place] laws. We do not discriminate on the basis of race, color, national origin, age, disability sex, sexual orientation or gender  identity.            Thank you!     Thank you for choosing [facility]  for your care. Our goal is always to provide you with excellent care. Hearing back from our patients is one way we can continue to improve our services. Please take a few minutes to complete the written survey that you may receive in the mail after your visit with us. Thank you!             Your Updated Medication List - Protect others around you: Learn how to safely use, store and throw away your medicines at www.disposemymeds.org.          This list is accurate as of: 8/28/17 12:18 PM.  Always use your most recent med list.                   Brand Name Dispense Instructions for use Diagnosis    ARIPiprazole 5 MG tablet    ABILIFY    90 tablet    Take 1 tablet (5 mg) by mouth At Bedtime    Adjustment disorder with mixed anxiety and depressed mood, Anxiety       cetirizine 10 MG tablet    zyrTEC     Take 10 mg by mouth daily        cyclobenzaprine 10 MG tablet    FLEXERIL    60 tablet    Take 2 tablets (20 mg) by mouth nightly as needed for muscle spasms or other (back pain)    Chronic bilateral low back pain with left-sided sciatica       dexlansoprazole 60 MG Cpdr CR capsule    DEXILANT    90 capsule    Take 1 capsule (60 mg) by mouth daily    Gastroesophageal reflux disease without esophagitis       diazepam 5 MG tablet    VALIUM    60 tablet    Take 0.5-1 tablets (2.5-5 mg) by mouth every 12 hours as needed for anxiety or sleep (MUSCLE SPASM) (one month supply)    Chronic pain syndrome, Chronic bilateral low back pain with left-sided sciatica, Lumbar disc disease with radiculopathy, S/P lumbar fusion       dicyclomine 20 MG tablet    BENTYL    30 tablet    Take 1 tablet (20 mg) by mouth 4 times daily as needed (ABDOMINAL CRAMPING) AS NEEDED FOR CRAMPING    Hx of Crohn's disease       DULoxetine 60 MG EC capsule    CYMBALTA    90 capsule    Take 1 capsule (60 mg) by mouth daily    Adjustment disorder with mixed anxiety and  depressed mood       EPINEPHrine 0.3 MG/0.3ML injection 2-pack    EPIPEN/ADRENACLICK/or ANY BX GENERIC EQUIV    0.6 mL    Inject 0.3 mLs (0.3 mg) into the muscle once as needed for anaphylaxis    Bee sting allergy       FLEX-A-MIN JOINT FLEX PO      Take by mouth daily Reported on 3/28/2017        fluticasone 50 MCG/ACT spray    FLONASE    1 Bottle    Spray 2 sprays into both nostrils daily    Seasonal allergic rhinitis due to pollen       gabapentin 300 MG capsule    NEURONTIN    270 capsule    Take 1 capsule (300 mg) by mouth 3 times daily    Chronic bilateral low back pain with left-sided sciatica       HYDROcodone-acetaminophen 5-325 MG per tablet    NORCO    60 tablet    Take 1 tablet by mouth every 4 hours as needed for pain maximum 4 tablet(s) per day    Chronic pain syndrome, Chronic bilateral low back pain with left-sided sciatica, S/P lumbar fusion, Lumbar disc disease with radiculopathy       lidocaine 5 % Patch    LIDODERM    30 patch    Apply up to 3 patches to painful area at once for up to 12 h within a 24 h period.  Remove after 12 hours.    Lumbar disc disease with radiculopathy       meclizine 12.5 MG tablet    ANTIVERT    30 tablet    Take 1 tablet (12.5 mg) by mouth 4 times daily as needed for dizziness    Dizziness       medical cannabis inhalation (Patient's own supply.  Not a prescription)      Inhale into the lungs 3 times daily as needed Reported on 3/28/2017        medroxyPROGESTERone 150 MG/ML injection    DEPO-PROVERA    3 mL    Inject 1 mL (150 mg) into the muscle every 3 months    Encounter for surveillance of injectable contraceptive, Dysmenorrhea       Multi-vitamin Tabs tablet   Generic drug:  multivitamin, therapeutic with minerals      Take 1 tablet by mouth daily Reported on 3/28/2017        ondansetron 4 MG ODT tab    ZOFRAN ODT    60 tablet    Take 1-2 tablets (4-8 mg) by mouth every 6 hours as needed for nausea Take 1-2 tablets by mouth as needed for nausea    Nausea        oxyCODONE 5 MG IR tablet    ROXICODONE    56 tablet    Take 1 tablet (5 mg) by mouth every 6 hours as needed for pain maximum 4 tablet(s) per day    S/P lumbar fusion, Chronic pain syndrome       pantoprazole 40 MG EC tablet    PROTONIX    90 tablet    Take 1 tablet (40 mg) by mouth daily Take 30-60 minutes before a meal.    Gastroesophageal reflux disease without esophagitis       promethazine 25 MG tablet    PHENERGAN    60 tablet    Take 1 tablet (25 mg) by mouth every 6 hours as needed for nausea    Hx of Crohn's disease       rizatriptan 5 MG ODT tab    MAXALT-MLT    18 tablet    Take 1-2 tablets (5-10 mg) by mouth at onset of headache for migraine May repeat in 2 hours. Max 6 tablets/24 hours.    Migraine with aura and without status migrainosus, not intractable       senna-docusate 8.6-50 MG per tablet    SENOKOT-S;PERICOLACE    60 tablet    Take 1 tablet by mouth 2 times daily as needed for constipation    Drug-induced constipation       sucralfate 1 GM/10ML suspension    CARAFATE    420 mL    Take 10 mLs (1 g) by mouth 4 times daily (before meals and nightly)    Abdominal pain, epigastric, Hematemesis with nausea

## 2017-08-28 NOTE — PATIENT INSTRUCTIONS
- Suture removal in 10-14 days   - Pain recheck 1 month     Wound Care Instructions    1.  Keep area dry today.    2.  Starting tomorrow wash gently with soap and water once daily.      3.  Apply Vaseline or antibiotic ointment to the area and cover with a bandage if desired.  Do not let it dry out and form a scab as this will make the resulting scar more noticeable.    4. Protect the area from sun for up to one year afterward as the scar is continuing to remodel.  Sun exposure will also make the resulting scar more noticeable.    5.  Call if the area is very red, tender, has a discharge or is very itchy while healing, or if you have any other questions.  These may be signs of early infection or allergy.                         Wound Closure and Wound Care  What is wound closure?   Wounds heal more quickly and with less risk of infection and scarring when the wound is cleaned and the wound edges are held together (closed). Scrapes, scratches, puncture wounds, and shallow cuts may need only cleaning, ointment, and a bandage. Some cuts may need to be closed with tape strips called Steri-Strips or tissue adhesive liquid (skin glue). If a cut or surgical incision is deep, very long, jagged, or under a lot of tension (such as a cut over a joint), stitches (also called sutures) or staples may be needed to close the wound.   How do I take care of my wound and sutures?   If you get an accidental cut, put pressure on the wound with a gauze pad or clean cloth right away to stop the bleeding. Then gently but thoroughly wash it with soap and cool water. Soapy water can be used around, but not in the cut. Try to remove all dirt and debris but do not scrub vigorously. If you decide to get medical treatment, cover the wound and apply pressure as needed to control bleeding while traveling to your healthcare provider's office, urgent care clinic, or emergency room.   After a wound is closed by your healthcare provider, the wound and  "the area around it must be kept clean and dry. The care of a stapled wound is similar to the care of a sutured wound. There are minor differences in caring for a wound closed with skin glue.   Do not let a wound closed with stitches or staples get wet for the first 24 hours. After 24 hours, you can shower or you can clean the wound with hydrogen peroxide or gently wash it with soap and warm water twice a day.   If your wound was closed with skin glue, keep the wound dry for the first 4 hours after the skin glue was put on. After the first 4 hours, you may occasionally and briefly wet the wound in the shower. You can clean the wound with hydrogen peroxide or gently wash it with soap and water twice a day.   If your wound is closed with Steri-Strips, they may be more likely to separate if they get wet. Keep them dry for the first few days while you're in the shower or bath.   Do not soak or scrub the wound. Do not take a bath, go swimming, or use a hot tub.   If recommended by your healthcare provider, you may put a small amount of antibiotic ointment on the wound each time you clean it. This can prevent infection. It will also help keep bandages from sticking to the wound. If a rash appears, stop using the ointment. If your wound is closed with skin glue, do not put liquid, antibiotic ointment, or any other product on the wound while the adhesive is in place. It may loosen the film before the wound is healed.   Make sure the wound is kept dry between washings. After showering or bathing, gently pat the wound dry with a soft towel.   Your healthcare provider may recommend that you cover the wound with gauze or a new, bandage to keep it from getting dirty. Be sure to keep the bandage dry. Put on a new bandage after cleaning the wound of if the old one gets dirty or wet.   Your healthcare provider may recommend leaving the wound \"open to air\" and not covered by a bandage while you sleep to help speed up the healing " process. If the wound was closed with skin glue, you do not need a bandage.   For the first 1 or 2 days keep the area propped up higher than your heart. This will help lessen your pain and any swelling.   Protect the wound from repeat injury until the skin has had time to heal.   Protect the wound from a lot of exposure to sunlight or tanning lamps while skin glue is in place. Wounds exposed to the sun can become red, while scars that have not been exposed to the sun usually turn white after a period of time.   Do not scratch, rub, or pick at your stitches, staples, or skin glue. This may cause them to loosen before the wound is healed.   Avoid activities that will make you sweat a lot until the skin glue has naturally fallen off or the stitches or staples have been removed.   Any wound can become infected. When you are cleaning your wound, look for these signs of infection:   increased redness   red streaks   increased swelling   increased pain or tenderness   pus or other drainage   warmth in the area of the wound   fever.   Contact your provider if you see any signs of infection.   If your wound was accidental, be sure to ask if a tetanus booster is needed. Treatment of accidental wounds may include taking an oral antibiotic to help prevent infection. Be sure to take the medicine until it is completely gone. Do not stop taking it just because the wound looks like it is healing well.   When are stitches, staples, or other types of wound closures removed?   Steri-Strips are usually left on until they fall off. If they have not fallen off after 2 weeks, they should be removed. Skin glue usually falls off on its own in 5 to 10 days.   For deep cuts the first stitches are placed under the skin. These stitches are made of materials that dissolve and do not need to be removed. Sutures or staples on the surface of the skin need to be removed by your healthcare provider 5 to 14 days after they are put in. The length of  time depends on where the cut is. Sutures in wounds on the face usually can be removed after 5 to 7 days. In areas of high stress, such as hands, knees, or elbows, the sutures must stay in 10 to 14 days. Your provider will tell you when you should come to the office for removal of your sutures or staples. Do NOT remove sutures or staples yourself unless your provider instructs you to do so. Staples are removed using a special tool. If you don't have the tool, don't try to remove the staples.   When should I call my healthcare provider?   Some swelling, redness, and pain are common with all wounds and normally go away as the wound heals.   Call your provider right away if:   You start to have any signs or symptoms of infection. These include:   Your skin is redder or more painful.   You have red streaks from the wound going toward your heart.   The wound area is very warm to touch.   You have pus or other fluid coming from the wound area.   You have a fever higher than 101.5? F (38.6? C).   You have chills, nausea, vomiting, or muscle aches.   The wound seems to be opening up or you notice any drainage.   The wound bleeds for more than 10 minutes.   The stitches or staples are loose.   The skin glue is loosening before it is supposed to.   You have any symptoms that worry you.     Published by [employer].  This content is reviewed periodically and is subject to change as new health information becomes available. The information is intended to inform and educate and is not a replacement for medical evaluation, advice, diagnosis or treatment by a healthcare professional.   Written by Quirino Mcnally MD.   ? 2010 [employer] and/or its affiliates. All Rights Reserved.   Copyright   [employer] 2011

## 2017-08-28 NOTE — NURSING NOTE
"Chief Complaint   Patient presents with     Depression     Anxiety     Asthma     Pain Management     Lesion Removal     Panel Management     utd        Initial /83 (BP Location: Left arm, Patient Position: Chair, Cuff Size: Adult Regular)  Pulse 96  Temp 99.8  F (37.7  C) (Oral)  Resp 18  Wt 154 lb (69.9 kg)  SpO2 100%  BMI 28.17 kg/m2 Estimated body mass index is 28.17 kg/(m^2) as calculated from the following:    Height as of 7/1/17: 5' 2\" (1.575 m).    Weight as of this encounter: 154 lb (69.9 kg).  Medication Reconciliation: complete   Katy Sheikh CMA      "

## 2017-08-29 ASSESSMENT — PATIENT HEALTH QUESTIONNAIRE - PHQ9: SUM OF ALL RESPONSES TO PHQ QUESTIONS 1-9: 2

## 2017-08-29 ASSESSMENT — ANXIETY QUESTIONNAIRES: GAD7 TOTAL SCORE: 6

## 2017-08-29 ASSESSMENT — ASTHMA QUESTIONNAIRES: ACT_TOTALSCORE: 24

## 2017-08-31 LAB — COPATH REPORT: NORMAL

## 2017-08-31 NOTE — PROGRESS NOTES
Savana Burns    Your biopsy results were negative for the mole on your leg and (again) pre-cancerous for the one on your back.     The results are attached for your review.       Zach Rosario PA-C

## 2017-09-07 ENCOUNTER — MYC MEDICAL ADVICE (OUTPATIENT)
Dept: FAMILY MEDICINE | Facility: OTHER | Age: 27
End: 2017-09-07

## 2017-09-07 DIAGNOSIS — F41.9 ANXIETY: ICD-10-CM

## 2017-09-07 DIAGNOSIS — M51.16 LUMBAR DISC DISEASE WITH RADICULOPATHY: ICD-10-CM

## 2017-09-07 DIAGNOSIS — M54.42 CHRONIC BILATERAL LOW BACK PAIN WITH LEFT-SIDED SCIATICA: ICD-10-CM

## 2017-09-07 DIAGNOSIS — Z98.1 S/P LUMBAR FUSION: ICD-10-CM

## 2017-09-07 DIAGNOSIS — J45.30 MILD PERSISTENT ASTHMA WITHOUT COMPLICATION: ICD-10-CM

## 2017-09-07 DIAGNOSIS — F43.23 ADJUSTMENT DISORDER WITH MIXED ANXIETY AND DEPRESSED MOOD: ICD-10-CM

## 2017-09-07 DIAGNOSIS — K21.9 GASTROESOPHAGEAL REFLUX DISEASE WITHOUT ESOPHAGITIS: ICD-10-CM

## 2017-09-07 DIAGNOSIS — G89.29 CHRONIC BILATERAL LOW BACK PAIN WITH LEFT-SIDED SCIATICA: ICD-10-CM

## 2017-09-07 DIAGNOSIS — L08.9 LOCAL INFECTION OF SKIN AND SUBCUTANEOUS TISSUE: Primary | ICD-10-CM

## 2017-09-07 DIAGNOSIS — G89.4 CHRONIC PAIN SYNDROME: ICD-10-CM

## 2017-09-07 RX ORDER — GABAPENTIN 300 MG/1
300 CAPSULE ORAL 3 TIMES DAILY
Qty: 90 CAPSULE | Refills: 0 | Status: SHIPPED | OUTPATIENT
Start: 2017-09-07 | End: 2017-09-11

## 2017-09-07 RX ORDER — DULOXETIN HYDROCHLORIDE 60 MG/1
60 CAPSULE, DELAYED RELEASE ORAL DAILY
Qty: 30 CAPSULE | Refills: 0 | Status: SHIPPED | OUTPATIENT
Start: 2017-09-07 | End: 2017-09-11

## 2017-09-07 RX ORDER — HYDROCODONE BITARTRATE AND ACETAMINOPHEN 5; 325 MG/1; MG/1
1 TABLET ORAL EVERY 4 HOURS PRN
Qty: 60 TABLET | Refills: 0 | Status: SHIPPED | OUTPATIENT
Start: 2017-09-11 | End: 2017-09-11

## 2017-09-07 RX ORDER — DEXLANSOPRAZOLE 60 MG/1
60 CAPSULE, DELAYED RELEASE ORAL DAILY
Qty: 30 CAPSULE | Refills: 0 | Status: SHIPPED | OUTPATIENT
Start: 2017-09-07 | End: 2017-09-11

## 2017-09-07 RX ORDER — ARIPIPRAZOLE 5 MG/1
5 TABLET ORAL AT BEDTIME
Qty: 30 TABLET | Refills: 0 | Status: SHIPPED | OUTPATIENT
Start: 2017-09-07 | End: 2018-01-15

## 2017-09-07 RX ORDER — CYCLOBENZAPRINE HCL 10 MG
20 TABLET ORAL
Qty: 60 TABLET | Refills: 0 | Status: SHIPPED | OUTPATIENT
Start: 2017-09-07 | End: 2017-09-11

## 2017-09-07 RX ORDER — ALBUTEROL SULFATE 90 UG/1
2 AEROSOL, METERED RESPIRATORY (INHALATION) EVERY 6 HOURS PRN
Qty: 1 INHALER | Refills: 1 | Status: SHIPPED | OUTPATIENT
Start: 2017-09-07 | End: 2017-09-11

## 2017-09-07 RX ORDER — CEPHALEXIN 500 MG/1
500 CAPSULE ORAL 2 TIMES DAILY
Qty: 20 CAPSULE | Refills: 0 | Status: SHIPPED | OUTPATIENT
Start: 2017-09-07 | End: 2017-09-19
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
## 2017-09-07 NOTE — TELEPHONE ENCOUNTER
Please notify patient can not increase without office visit. She can give consent for mom to , however not due for refill until next Monday, 9/11/17. I have signed script with that date.     Zach Rosario PA-C  [facility]

## 2017-09-07 NOTE — TELEPHONE ENCOUNTER
Please advise patient   - I have sent all refills except pain medication, this requires hard script and can not be faxed or mailed. Advise urgent care or ED for pain medication.   - Looked a picture, agree infected. Sent Keflex twice a day for 10 days.     Zach Rosario PA-C  [facility]

## 2017-09-11 ENCOUNTER — OFFICE VISIT (OUTPATIENT)
Dept: FAMILY MEDICINE | Facility: OTHER | Age: 27
End: 2017-09-11
Payer: MEDICARE

## 2017-09-11 VITALS
SYSTOLIC BLOOD PRESSURE: 118 MMHG | RESPIRATION RATE: 18 BRPM | WEIGHT: 151 LBS | HEART RATE: 80 BPM | BODY MASS INDEX: 27.62 KG/M2 | DIASTOLIC BLOOD PRESSURE: 80 MMHG | OXYGEN SATURATION: 100 % | TEMPERATURE: 99.1 F

## 2017-09-11 DIAGNOSIS — G89.4 CHRONIC PAIN SYNDROME: Primary | ICD-10-CM

## 2017-09-11 DIAGNOSIS — J20.9 ACUTE BRONCHITIS WITH COEXISTING CONDITION REQUIRING PROPHYLACTIC TREATMENT: ICD-10-CM

## 2017-09-11 DIAGNOSIS — R07.0 THROAT PAIN: ICD-10-CM

## 2017-09-11 DIAGNOSIS — G89.29 CHRONIC BILATERAL LOW BACK PAIN WITH LEFT-SIDED SCIATICA: ICD-10-CM

## 2017-09-11 DIAGNOSIS — Z87.19 HX OF CROHN'S DISEASE: ICD-10-CM

## 2017-09-11 DIAGNOSIS — M51.16 LUMBAR DISC DISEASE WITH RADICULOPATHY: ICD-10-CM

## 2017-09-11 DIAGNOSIS — K59.03 DRUG-INDUCED CONSTIPATION: ICD-10-CM

## 2017-09-11 DIAGNOSIS — J45.30 MILD PERSISTENT ASTHMA WITHOUT COMPLICATION: ICD-10-CM

## 2017-09-11 DIAGNOSIS — M54.42 CHRONIC BILATERAL LOW BACK PAIN WITH LEFT-SIDED SCIATICA: ICD-10-CM

## 2017-09-11 DIAGNOSIS — F43.23 ADJUSTMENT DISORDER WITH MIXED ANXIETY AND DEPRESSED MOOD: ICD-10-CM

## 2017-09-11 DIAGNOSIS — K21.9 GASTROESOPHAGEAL REFLUX DISEASE WITHOUT ESOPHAGITIS: ICD-10-CM

## 2017-09-11 DIAGNOSIS — Z98.1 S/P LUMBAR FUSION: ICD-10-CM

## 2017-09-11 LAB
DEPRECATED S PYO AG THROAT QL EIA: NORMAL
SPECIMEN SOURCE: NORMAL

## 2017-09-11 PROCEDURE — 99214 OFFICE O/P EST MOD 30 MIN: CPT | Performed by: PHYSICIAN ASSISTANT

## 2017-09-11 PROCEDURE — 87081 CULTURE SCREEN ONLY: CPT | Performed by: PHYSICIAN ASSISTANT

## 2017-09-11 PROCEDURE — T1013 SIGN LANG/ORAL INTERPRETER: HCPCS | Mod: U3

## 2017-09-11 PROCEDURE — 87880 STREP A ASSAY W/OPTIC: CPT | Performed by: PHYSICIAN ASSISTANT

## 2017-09-11 RX ORDER — DEXLANSOPRAZOLE 60 MG/1
60 CAPSULE, DELAYED RELEASE ORAL DAILY
Qty: 30 CAPSULE | Refills: 3 | Status: SHIPPED | OUTPATIENT
Start: 2017-09-11 | End: 2018-03-02

## 2017-09-11 RX ORDER — DICYCLOMINE HCL 20 MG
20 TABLET ORAL 4 TIMES DAILY PRN
Qty: 30 TABLET | Refills: 3 | Status: SHIPPED | OUTPATIENT
Start: 2017-09-11 | End: 2018-04-03

## 2017-09-11 RX ORDER — CYCLOBENZAPRINE HCL 10 MG
20 TABLET ORAL
Qty: 60 TABLET | Refills: 3 | Status: SHIPPED | OUTPATIENT
Start: 2017-09-11 | End: 2018-02-08

## 2017-09-11 RX ORDER — HYDROCODONE BITARTRATE AND ACETAMINOPHEN 5; 325 MG/1; MG/1
1 TABLET ORAL EVERY 4 HOURS PRN
Qty: 60 TABLET | Refills: 0 | Status: SHIPPED | OUTPATIENT
Start: 2017-09-11 | End: 2017-09-11

## 2017-09-11 RX ORDER — GABAPENTIN 300 MG/1
300 CAPSULE ORAL 3 TIMES DAILY
Qty: 90 CAPSULE | Refills: 3 | Status: SHIPPED | OUTPATIENT
Start: 2017-09-11 | End: 2018-01-15

## 2017-09-11 RX ORDER — AMOXICILLIN 250 MG
1 CAPSULE ORAL 2 TIMES DAILY PRN
Qty: 60 TABLET | Refills: 3 | Status: SHIPPED | OUTPATIENT
Start: 2017-09-11 | End: 2017-12-21 | Stop reason: ALTCHOICE

## 2017-09-11 RX ORDER — HYDROCODONE BITARTRATE AND ACETAMINOPHEN 5; 325 MG/1; MG/1
1 TABLET ORAL EVERY 4 HOURS PRN
Qty: 60 TABLET | Refills: 0 | Status: SHIPPED | OUTPATIENT
Start: 2017-09-25 | End: 2017-09-29

## 2017-09-11 RX ORDER — OXYCODONE HYDROCHLORIDE 5 MG/1
TABLET ORAL
Refills: 0 | COMMUNITY
Start: 2017-07-27 | End: 2017-09-11

## 2017-09-11 RX ORDER — ALBUTEROL SULFATE 90 UG/1
2 AEROSOL, METERED RESPIRATORY (INHALATION) EVERY 6 HOURS PRN
Qty: 1 INHALER | Refills: 1 | Status: SHIPPED | OUTPATIENT
Start: 2017-09-11 | End: 2018-01-15

## 2017-09-11 RX ORDER — ALBUTEROL SULFATE 0.83 MG/ML
1 SOLUTION RESPIRATORY (INHALATION) EVERY 6 HOURS PRN
Qty: 50 VIAL | Refills: 1 | Status: SHIPPED | OUTPATIENT
Start: 2017-09-11 | End: 2018-01-15

## 2017-09-11 RX ORDER — DULOXETIN HYDROCHLORIDE 60 MG/1
60 CAPSULE, DELAYED RELEASE ORAL DAILY
Qty: 30 CAPSULE | Refills: 3 | Status: SHIPPED | OUTPATIENT
Start: 2017-09-11 | End: 2017-12-05

## 2017-09-11 ASSESSMENT — ANXIETY QUESTIONNAIRES
7. FEELING AFRAID AS IF SOMETHING AWFUL MIGHT HAPPEN: SEVERAL DAYS
3. WORRYING TOO MUCH ABOUT DIFFERENT THINGS: NEARLY EVERY DAY
7. FEELING AFRAID AS IF SOMETHING AWFUL MIGHT HAPPEN: SEVERAL DAYS
GAD7 TOTAL SCORE: 16
5. BEING SO RESTLESS THAT IT IS HARD TO SIT STILL: SEVERAL DAYS
4. TROUBLE RELAXING: MORE THAN HALF THE DAYS
6. BECOMING EASILY ANNOYED OR IRRITABLE: NEARLY EVERY DAY
2. NOT BEING ABLE TO STOP OR CONTROL WORRYING: NEARLY EVERY DAY
GAD7 TOTAL SCORE: 16
GAD7 TOTAL SCORE: 16
1. FEELING NERVOUS, ANXIOUS, OR ON EDGE: NEARLY EVERY DAY

## 2017-09-11 ASSESSMENT — PATIENT HEALTH QUESTIONNAIRE - PHQ9
SUM OF ALL RESPONSES TO PHQ QUESTIONS 1-9: 7
10. IF YOU CHECKED OFF ANY PROBLEMS, HOW DIFFICULT HAVE THESE PROBLEMS MADE IT FOR YOU TO DO YOUR WORK, TAKE CARE OF THINGS AT HOME, OR GET ALONG WITH OTHER PEOPLE: SOMEWHAT DIFFICULT
SUM OF ALL RESPONSES TO PHQ QUESTIONS 1-9: 7

## 2017-09-11 ASSESSMENT — PAIN SCALES - GENERAL: PAINLEVEL: NO PAIN (0)

## 2017-09-11 NOTE — NURSING NOTE
"Chief Complaint   Patient presents with     Recheck Medication     RECHECK     mole removal     Panel Management     phq9, flu       Initial /80  Pulse 80  Temp 99.1  F (37.3  C) (Oral)  Resp 18  Wt 151 lb (68.5 kg)  SpO2 100%  BMI 27.62 kg/m2 Estimated body mass index is 27.62 kg/(m^2) as calculated from the following:    Height as of 7/1/17: 5' 2\" (1.575 m).    Weight as of this encounter: 151 lb (68.5 kg).  Medication Reconciliation: complete     Laisha Guadalupe MA       "

## 2017-09-11 NOTE — PATIENT INSTRUCTIONS
- Recheck 1 month for back pain    - Continue antibiotic as prescribed  - Albuterol neb or inhaler - 3x/day for 4-5 days, then as needed                            Acute Bronchitis                  What is acute bronchitis?   Bronchitis is an infection of the air passages--that is, the tubes that connect the windpipe to the lungs. It causes swelling and irritation of the airways. With acute bronchitis you usually have a cough that produces phlegm and pain behind the breastbone when you breathe deeply or cough.   How does it occur?   Bronchitis often occurs with viral infections of the respiratory tract, such as colds and flu. Bronchitis may also be caused by bacterial infections. It may occur with childhood illnesses such as measles and whooping cough.   Attacks are most frequent during the winter or when the level of air pollution is high.   Infants, young children, older adults, smokers, and people with heart disease or lung disease (including asthma and allergies) are most likely to get acute bronchitis.   What are the symptoms?   Symptoms may include:   a deep cough that produces yellowish or greenish phlegm   pain behind the breastbone when you breathe deeply or cough   wheezing   feeling short of breath   fever   chills   headache   sore muscles.   How is it diagnosed?   Your healthcare provider will ask about your symptoms and examine you. You may have tests, such as:   a test of phlegm to look for bacteria   chest X-ray   blood tests.   How is it treated?   Acute bronchitis often does not require medical treatment. Resting at home and drinking plenty of fluids to keep the mucus loose may be all you need to do to get better in a few days. If your symptoms are severe or you have other health problems (such as heart or lung disease or diabetes), you may need to take antibiotics.   How long will the effects last?   Most of the time acute bronchitis clears up in a few days. Your cough may slowly get better in 1  to 2 weeks.   It may take you longer to recover if:   You are a smoker.   You live in an area where air pollution is a problem.   You have a heart or lung disease.   You have any other continuing health problems.   How can I take care of myself?   You can help yourself by:   following the full treatment your healthcare provider recommends   using a vaporizer, humidifier, or steam from hot water to add moisture to the air   drinking plenty of liquids   taking cough medicine if recommended by your healthcare provider   resting in bed   taking aspirin or acetaminophen to reduce fever and relieve headache and muscle pain (Check with your healthcare provider before you give any medicine that contains aspirin or salicylates to a child or teen. This includes medicines like baby aspirin, some cold medicines, and Pepto Bismol. Children and teens who take aspirin are at risk for a serious illness called Reye's syndrome.)   eating healthy meals.   Call your healthcare provider if:   You have trouble breathing.   You have a fever of 101.5?F (38.6?C) or higher.   You cough up blood.   Your symptoms are getting worse instead of better.   You don't start to feel better after 3 days of treatment.   You have any symptoms that concern you.   How can I help prevent acute bronchitis?   To reduce your risk of getting a respiratory infection:   Do not smoke.   Wash your hands often, especially when you are around people with colds (upper respiratory infections).   If you have asthma or allergies, keep your symptoms under good control.   Get regular exercise.   Eat healthy foods.       Published by [employer].  This content is reviewed periodically and is subject to change as new health information becomes available. The information is intended to inform and educate and is not a replacement for medical evaluation, advice, diagnosis or treatment by a healthcare professional.   Developed by [employer].   ? 2010 [employer] and/or its  affiliates. All Rights Reserved.   Copyright   [employer] 2011

## 2017-09-11 NOTE — MR AVS SNAPSHOT
After Visit Summary   9/11/2017    Katy Islas    MRN: [id number]           Patient Information     Date Of Birth          1990        Visit Information        Provider Department      9/11/2017 4:00 PM Dayana Roldan; Aura Rosario, [facility]        Today's Diagnoses     Chronic pain syndrome    -  1    Chronic bilateral low back pain with left-sided sciatica        Lumbar disc disease with radiculopathy        S/P lumbar fusion        Throat pain        Adjustment disorder with mixed anxiety and depressed mood        Gastroesophageal reflux disease without esophagitis        Mild persistent asthma without complication        Hx of Crohn's disease        Drug-induced constipation          Care Instructions    - Recheck 1 month for back pain    - Continue antibiotic as prescribed  - Albuterol neb or inhaler - 3x/day for 4-5 days, then as needed                            Acute Bronchitis                  What is acute bronchitis?   Bronchitis is an infection of the air passages--that is, the tubes that connect the windpipe to the lungs. It causes swelling and irritation of the airways. With acute bronchitis you usually have a cough that produces phlegm and pain behind the breastbone when you breathe deeply or cough.   How does it occur?   Bronchitis often occurs with viral infections of the respiratory tract, such as colds and flu. Bronchitis may also be caused by bacterial infections. It may occur with childhood illnesses such as measles and whooping cough.   Attacks are most frequent during the winter or when the level of air pollution is high.   Infants, young children, older adults, smokers, and people with heart disease or lung disease (including asthma and allergies) are most likely to get acute bronchitis.   What are the symptoms?   Symptoms may include:   a deep cough that produces yellowish or greenish phlegm   pain behind the breastbone when you  breathe deeply or cough   wheezing   feeling short of breath   fever   chills   headache   sore muscles.   How is it diagnosed?   Your healthcare provider will ask about your symptoms and examine you. You may have tests, such as:   a test of phlegm to look for bacteria   chest X-ray   blood tests.   How is it treated?   Acute bronchitis often does not require medical treatment. Resting at home and drinking plenty of fluids to keep the mucus loose may be all you need to do to get better in a few days. If your symptoms are severe or you have other health problems (such as heart or lung disease or diabetes), you may need to take antibiotics.   How long will the effects last?   Most of the time acute bronchitis clears up in a few days. Your cough may slowly get better in 1 to 2 weeks.   It may take you longer to recover if:   You are a smoker.   You live in an area where air pollution is a problem.   You have a heart or lung disease.   You have any other continuing health problems.   How can I take care of myself?   You can help yourself by:   following the full treatment your healthcare provider recommends   using a vaporizer, humidifier, or steam from hot water to add moisture to the air   drinking plenty of liquids   taking cough medicine if recommended by your healthcare provider   resting in bed   taking aspirin or acetaminophen to reduce fever and relieve headache and muscle pain (Check with your healthcare provider before you give any medicine that contains aspirin or salicylates to a child or teen. This includes medicines like baby aspirin, some cold medicines, and Pepto Bismol. Children and teens who take aspirin are at risk for a serious illness called Reye's syndrome.)   eating healthy meals.   Call your healthcare provider if:   You have trouble breathing.   You have a fever of 101.5?F (38.6?C) or higher.   You cough up blood.   Your symptoms are getting worse instead of better.   You don't start to feel  better after 3 days of treatment.   You have any symptoms that concern you.   How can I help prevent acute bronchitis?   To reduce your risk of getting a respiratory infection:   Do not smoke.   Wash your hands often, especially when you are around people with colds (upper respiratory infections).   If you have asthma or allergies, keep your symptoms under good control.   Get regular exercise.   Eat healthy foods.       Published by [employer].  This content is reviewed periodically and is subject to change as new health information becomes available. The information is intended to inform and educate and is not a replacement for medical evaluation, advice, diagnosis or treatment by a healthcare professional.   Developed by [employer].   ? 2010 [employer] and/or its affiliates. All Rights Reserved.   [employer] 2011                  Follow-ups after your visit        Who to contact     If you have questions or need follow up information about today's clinic visit or your schedule please contact [facility] directly at [phone].  Normal or non-critical lab and imaging results will be communicated to you by Rule.hart, letter or phone within 4 business days after the clinic has received the results. If you do not hear from us within 7 days, please contact the clinic through CartiCuret or phone. If you have a critical or abnormal lab result, we will notify you by phone as soon as possible.  Submit refill requests through Aptana or call your pharmacy and they will forward the refill request to us. Please allow 3 business days for your refill to be completed.          Additional Information About Your Visit        Aptana Information     [employer] gives you secure access to your electronic health record. If you see a primary care provider, you can also send messages to your care team and make appointments. If you have questions, please call your primary care clinic.  If you do  not have a primary care provider, please call [phone] and they will assist you.        Care EveryWhere ID     This is your Care EveryWhere ID. This could be used by other organizations to access your [place] medical records  [id number]        Your Vitals Were     Pulse Temperature Respirations Pulse Oximetry BMI (Body Mass Index)       80 99.1  F (37.3  C) (Oral) 18 100% 27.62 kg/m2        Blood Pressure from Last 3 Encounters:   09/11/17 118/80   08/28/17 119/83   08/21/17 139/90    Weight from Last 3 Encounters:   09/11/17 151 lb (68.5 kg)   08/28/17 154 lb (69.9 kg)   08/21/17 157 lb 8 oz (71.4 kg)              We Performed the Following     Beta strep group A culture     Strep, Rapid Screen          Today's Medication Changes          These changes are accurate as of: 9/11/17  4:34 PM.  If you have any questions, ask your nurse or doctor.               Start taking these medicines.        Dose/Directions    HYDROcodone-acetaminophen 5-325 MG per tablet   Commonly known as:  NORCO   Used for:  Chronic pain syndrome, Chronic bilateral low back pain with left-sided sciatica, S/P lumbar fusion, Lumbar disc disease with radiculopathy   Started by:  Aura Rosario PA-C        Dose:  1 tablet   Start taking on:  9/25/2017   Take 1 tablet by mouth every 4 hours as needed for pain maximum 4 tablet(s) per day   Quantity:  60 tablet   Refills:  0       order for DME   Used for:  Mild persistent asthma without complication   Started by:  Aura Rosario PA-C        Nebulizer with mask and tubing   Quantity:  1 each   Refills:  0         These medicines have changed or have updated prescriptions.        Dose/Directions    * albuterol 108 (90 BASE) MCG/ACT Inhaler   Commonly known as:  PROAIR HFA/PROVENTIL HFA/VENTOLIN HFA   This may have changed:  Another medication with the same name was added. Make sure you understand how and when to take each.   Used for:  Mild persistent asthma  without complication   Changed by:  Aura Rosario PA-C        Dose:  2 puff   Inhale 2 puffs into the lungs every 6 hours as needed for shortness of breath / dyspnea or wheezing   Quantity:  1 Inhaler   Refills:  1       * albuterol (2.5 MG/3ML) 0.083% neb solution   This may have changed:  You were already taking a medication with the same name, and this prescription was added. Make sure you understand how and when to take each.   Used for:  Mild persistent asthma without complication   Changed by:  Aura Rosario PA-C        Dose:  1 vial   Take 1 vial (2.5 mg) by nebulization every 6 hours as needed for shortness of breath / dyspnea or wheezing   Quantity:  50 vial   Refills:  1       * Notice:  This list has 2 medication(s) that are the same as other medications prescribed for you. Read the directions carefully, and ask your doctor or other care provider to review them with you.         Where to get your medicines      These medications were sent to [facility] [address] [place] [address] [facility] AT [facility] [address]  [address] [facility] [id number]     Phone:  [phone]     albuterol (2.5 MG/3ML) 0.083% neb solution    albuterol 108 (90 BASE) MCG/ACT Inhaler    cyclobenzaprine 10 MG tablet    dexlansoprazole 60 MG Cpdr CR capsule    dicyclomine 20 MG tablet    DULoxetine 60 MG EC capsule    gabapentin 300 MG capsule    senna-docusate 8.6-50 MG per tablet         Some of these will need a paper prescription and others can be bought over the counter.  Ask your nurse if you have questions.     Bring a paper prescription for each of these medications     HYDROcodone-acetaminophen 5-325 MG per tablet    order for DME                Primary Care Provider Office Phone # Fax #    Aura Rosario PA-C [phone]       [address] [facility] [address]  [facility] [address]        Equal Access to Services     VIKY VILLANUEVA AH: Ruth daigle  edna Chen, wasabinada luqadaha, qaybta kaalbenson ragland, susan calebin hayaapercy brownfloyd sadaалександр laFelishayani yue. So [facility] [phone].    ATENCIÓN: Si habla venita, tiene a kerr disposición servicios gratuitos de asistencia lingüística. Casper al [phone].    We comply with applicable federal civil rights laws and [place] laws. We do not discriminate on the basis of race, color, national origin, age, disability sex, sexual orientation or gender identity.            Thank you!     Thank you for choosing [facility]  for your care. Our goal is always to provide you with excellent care. Hearing back from our patients is one way we can continue to improve our services. Please take a few minutes to complete the written survey that you may receive in the mail after your visit with us. Thank you!             Your Updated Medication List - Protect others around you: Learn how to safely use, store and throw away your medicines at www.disposemymeds.org.          This list is accurate as of: 9/11/17  4:34 PM.  Always use your most recent med list.                   Brand Name Dispense Instructions for use Diagnosis    * albuterol 108 (90 BASE) MCG/ACT Inhaler    PROAIR HFA/PROVENTIL HFA/VENTOLIN HFA    1 Inhaler    Inhale 2 puffs into the lungs every 6 hours as needed for shortness of breath / dyspnea or wheezing    Mild persistent asthma without complication       * albuterol (2.5 MG/3ML) 0.083% neb solution     50 vial    Take 1 vial (2.5 mg) by nebulization every 6 hours as needed for shortness of breath / dyspnea or wheezing    Mild persistent asthma without complication       ARIPiprazole 5 MG tablet    ABILIFY    30 tablet    Take 1 tablet (5 mg) by mouth At Bedtime    Adjustment disorder with mixed anxiety and depressed mood, Anxiety       cephALEXin 500 MG capsule    KEFLEX    20 capsule    Take 1 capsule (500 mg) by mouth 2 times daily    Local infection of skin and subcutaneous tissue       cetirizine  10 MG tablet    zyrTEC     Take 10 mg by mouth daily        cyclobenzaprine 10 MG tablet    FLEXERIL    60 tablet    Take 2 tablets (20 mg) by mouth nightly as needed for muscle spasms or other (back pain)    Chronic bilateral low back pain with left-sided sciatica       dexlansoprazole 60 MG Cpdr CR capsule    DEXILANT    30 capsule    Take 1 capsule (60 mg) by mouth daily    Gastroesophageal reflux disease without esophagitis       diazepam 5 MG tablet    VALIUM    60 tablet    Take 0.5-1 tablets (2.5-5 mg) by mouth every 12 hours as needed for anxiety or sleep (MUSCLE SPASM) (one month supply)    Chronic pain syndrome, Chronic bilateral low back pain with left-sided sciatica, Lumbar disc disease with radiculopathy, S/P lumbar fusion       dicyclomine 20 MG tablet    BENTYL    30 tablet    Take 1 tablet (20 mg) by mouth 4 times daily as needed (ABDOMINAL CRAMPING) AS NEEDED FOR CRAMPING    Hx of Crohn's disease       DULoxetine 60 MG EC capsule    CYMBALTA    30 capsule    Take 1 capsule (60 mg) by mouth daily    Adjustment disorder with mixed anxiety and depressed mood       EPINEPHrine 0.3 MG/0.3ML injection 2-pack    EPIPEN/ADRENACLICK/or ANY BX GENERIC EQUIV    0.6 mL    Inject 0.3 mLs (0.3 mg) into the muscle once as needed for anaphylaxis    Bee sting allergy       FLEX-A-MIN JOINT FLEX PO      Take by mouth daily Reported on 3/28/2017        fluticasone 50 MCG/ACT spray    FLONASE    1 Bottle    Spray 2 sprays into both nostrils daily    Seasonal allergic rhinitis due to pollen       gabapentin 300 MG capsule    NEURONTIN    90 capsule    Take 1 capsule (300 mg) by mouth 3 times daily    Chronic bilateral low back pain with left-sided sciatica       HYDROcodone-acetaminophen 5-325 MG per tablet   Start taking on:  9/25/2017    NORCO    60 tablet    Take 1 tablet by mouth every 4 hours as needed for pain maximum 4 tablet(s) per day    Chronic pain syndrome, Chronic bilateral low back pain with left-sided  sciatica, S/P lumbar fusion, Lumbar disc disease with radiculopathy       lidocaine 5 % Patch    LIDODERM    30 patch    Apply up to 3 patches to painful area at once for up to 12 h within a 24 h period.  Remove after 12 hours.    Lumbar disc disease with radiculopathy       meclizine 12.5 MG tablet    ANTIVERT    30 tablet    Take 1 tablet (12.5 mg) by mouth 4 times daily as needed for dizziness    Dizziness       medical cannabis inhalation (Patient's own supply.  Not a prescription)      Inhale into the lungs 3 times daily as needed Reported on 3/28/2017        medroxyPROGESTERone 150 MG/ML injection    DEPO-PROVERA    3 mL    Inject 1 mL (150 mg) into the muscle every 3 months    Encounter for surveillance of injectable contraceptive, Dysmenorrhea       Multi-vitamin Tabs tablet   Generic drug:  multivitamin, therapeutic with minerals      Take 1 tablet by mouth daily Reported on 3/28/2017        ondansetron 4 MG ODT tab    ZOFRAN ODT    60 tablet    Take 1-2 tablets (4-8 mg) by mouth every 6 hours as needed for nausea Take 1-2 tablets by mouth as needed for nausea    Nausea       order for DME     1 each    Nebulizer with mask and tubing    Mild persistent asthma without complication       pantoprazole 40 MG EC tablet    PROTONIX    90 tablet    Take 1 tablet (40 mg) by mouth daily Take 30-60 minutes before a meal.    Gastroesophageal reflux disease without esophagitis       promethazine 25 MG tablet    PHENERGAN    60 tablet    Take 1 tablet (25 mg) by mouth every 6 hours as needed for nausea    Hx of Crohn's disease       rizatriptan 5 MG ODT tab    MAXALT-MLT    18 tablet    Take 1-2 tablets (5-10 mg) by mouth at onset of headache for migraine May repeat in 2 hours. Max 6 tablets/24 hours.    Migraine with aura and without status migrainosus, not intractable       senna-docusate 8.6-50 MG per tablet    SENOKOT-S;PERICOLACE    60 tablet    Take 1 tablet by mouth 2 times daily as needed for constipation     Drug-induced constipation       sucralfate 1 GM/10ML suspension    CARAFATE    420 mL    Take 10 mLs (1 g) by mouth 4 times daily (before meals and nightly)    Abdominal pain, epigastric, Hematemesis with nausea       * Notice:  This list has 2 medication(s) that are the same as other medications prescribed for you. Read the directions carefully, and ask your doctor or other care provider to review them with you.

## 2017-09-12 LAB
BACTERIA SPEC CULT: NORMAL
SPECIMEN SOURCE: NORMAL

## 2017-09-12 ASSESSMENT — PATIENT HEALTH QUESTIONNAIRE - PHQ9: SUM OF ALL RESPONSES TO PHQ QUESTIONS 1-9: 7

## 2017-09-12 ASSESSMENT — ANXIETY QUESTIONNAIRES: GAD7 TOTAL SCORE: 16

## 2017-09-13 ENCOUNTER — TELEPHONE (OUTPATIENT)
Dept: FAMILY MEDICINE | Facility: OTHER | Age: 27
End: 2017-09-13

## 2017-09-13 ENCOUNTER — MYC MEDICAL ADVICE (OUTPATIENT)
Dept: FAMILY MEDICINE | Facility: OTHER | Age: 27
End: 2017-09-13

## 2017-09-13 DIAGNOSIS — J20.9 ACUTE BRONCHITIS, UNSPECIFIED ORGANISM: Primary | ICD-10-CM

## 2017-09-13 RX ORDER — BENZONATATE 100 MG/1
100 CAPSULE ORAL 3 TIMES DAILY PRN
Qty: 30 CAPSULE | Refills: 0 | Status: SHIPPED | OUTPATIENT
Start: 2017-09-13 | End: 2017-09-19

## 2017-09-13 NOTE — TELEPHONE ENCOUNTER
Zach, please advise working pt in or prescribing her medication.   You saw her on 9/11/17.   Pt was offered to see other providers, but refused to.   Jose Finney MA  September 13, 2017

## 2017-09-13 NOTE — TELEPHONE ENCOUNTER
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 Assuming she needs an appointment for any medications to be rx'd.  Patient was seen on 09/11 for acute illness.  Abimbola Castro CMA      FYI to CDL: looks like patient is upset with us per message below. She initiated phone call requesting appointment. We called her back to help her get scheduled, and she hung up on Kirsi and sent the message below. Just wanted you to be aware in case she mentions it.

## 2017-09-13 NOTE — TELEPHONE ENCOUNTER
Writer called pt and discussed that there are no openings today.   Then, found out  can see her at 4:15pm today or 1 pm tomorrow with . Pt hung up on me before I could do some more research.    LM for pt informing message above. Please re-try pt to verify appointment times.   Jose Finney MA  September 13, 2017

## 2017-09-13 NOTE — TELEPHONE ENCOUNTER
Reason for Call:  Same Day Appointment, Requested Provider:  ZAYNAB Daley     PCP: Aura Rosario    Reason for visit: f/u cough & throat not better    Duration of symptoms: seen 09/11/2017    Have you been treated for this in the past? Yes    Additional comments: patient is requesting an appointment today    Can we leave a detailed message on this number? NO    Phone number patient can be reached at: Home number on file [phone] (home) or Cell number on file:    Telephone Information:   Mobile [phone]       Best Time: anytime    Call taken on 9/13/2017 at 12:10 PM by Nani Brito

## 2017-09-13 NOTE — TELEPHONE ENCOUNTER
Fax from [employer].  They have Rx for Albuterol neb sol.  It is not covered.did you want to change or PA?     Ins [phone]  ID [id number]

## 2017-09-13 NOTE — TELEPHONE ENCOUNTER
Patient returned call and was given message below. Patient is ok with waiting to hear about an appointment for tomorrow but she wants to know if a cough medication can be called in? Patient uses [facility] [place].    Thank you Nani

## 2017-09-14 NOTE — TELEPHONE ENCOUNTER
Patient went to  yesterday, got Decadron and steroids oral. No appointment needed. Replied via [employer].     Zach Rosario PA-C  [facility]

## 2017-09-14 NOTE — TELEPHONE ENCOUNTER
Can be worked in TOMORROW for cough/throat recheck. OK for Dr only. Let patient know she should give Keflex more time to work.     Zach Rosario PA-C  [facility]

## 2017-09-15 ENCOUNTER — MYC MEDICAL ADVICE (OUTPATIENT)
Dept: FAMILY MEDICINE | Facility: OTHER | Age: 27
End: 2017-09-15

## 2017-09-15 NOTE — TELEPHONE ENCOUNTER
Absolutely not, she just had this filled on 9/11 and cannot get her next refill until 9/25.    Bob Monson PA-C

## 2017-09-15 NOTE — TELEPHONE ENCOUNTER
Spoke to pharmacy and informed them that Medicare Part D not covered because Medicare Part B will cover this med. So they will re-run this through medicare Part B

## 2017-09-15 NOTE — TELEPHONE ENCOUNTER
Patient is needing authorization to fill her Norco. Patient had flexeril and gabapentin sent to pharmacy on 09/11 so should be good on those. 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    Norco rx is dated for 09/25.    Last Norco was ordered 09/11 for 60, but was discontinued on that date? Unsure with this.    Also routing to CDL.  Abimbola Castro, CMA

## 2017-09-18 ENCOUNTER — TELEPHONE (OUTPATIENT)
Dept: FAMILY MEDICINE | Facility: OTHER | Age: 27
End: 2017-09-18

## 2017-09-18 DIAGNOSIS — G89.29 CHRONIC BILATERAL LOW BACK PAIN WITH LEFT-SIDED SCIATICA: ICD-10-CM

## 2017-09-18 DIAGNOSIS — G89.4 CHRONIC PAIN SYNDROME: ICD-10-CM

## 2017-09-18 DIAGNOSIS — Z98.1 S/P LUMBAR FUSION: ICD-10-CM

## 2017-09-18 DIAGNOSIS — M54.42 CHRONIC BILATERAL LOW BACK PAIN WITH LEFT-SIDED SCIATICA: ICD-10-CM

## 2017-09-18 DIAGNOSIS — M51.16 LUMBAR DISC DISEASE WITH RADICULOPATHY: ICD-10-CM

## 2017-09-18 NOTE — TELEPHONE ENCOUNTER
Reason for Call:  Same Day Appointment, Requested Provider:  ZAYNAB Daley     PCP: Aura Rosario    Reason for visit:  Fever, cough    Duration of symptoms: ongoing    Have you been treated for this in the past? Yes    Additional comments: patient is scheduled for tomorrow but would like to be seen today.  Please advise.  Thank you    Can we leave a detailed message on this number? YES    Phone number patient can be reached at: Other phone number:  [phone] is the relay service.     Best Time: any    Call taken on 9/18/2017 at 11:30 AM by Damaris Monroe

## 2017-09-18 NOTE — TELEPHONE ENCOUNTER
Pt is calling and she would like to see if there would be any other provider that would be able to see here today.She thinks she has bronchitis and also she would like to get a new prescription. Please advise.

## 2017-09-18 NOTE — TELEPHONE ENCOUNTER
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 Nohemi Jennings RN, BSN

## 2017-09-18 NOTE — TELEPHONE ENCOUNTER
I called and spoke to [facility]        They just needed ok to fill Valium early, last fill was 8/31/17       They confirmed she did fill her Norco on 9/11/17 as I gave her     - Per patient report, medications were stolen in [place] including the Valium fill from 8/31/17    Sent reply to patient. OK'd early fill for Valium. Can't fill Norco until 8/25/17    Zach Rosario PA-C  [facility]

## 2017-09-18 NOTE — TELEPHONE ENCOUNTER
PHARMACY NOTE:  PATIENT NEEDS AN EARLY REFILL DUE TO LOST Rx IN MAIL FROM [place]    diazepam (VALIUM) 5 MG tablet      Last Written Prescription Date:  8/3/17  Last Fill Quantity: 60,   # refills: 3  Last Office Visit with [facility], [facility] or [facility] prescribing provider: 9/11/17  Future Office visit:    Next 5 appointments (look out 90 days)     Sep 19, 2017  1:00 PM CDT   Office Visit with Aura Rosario PA-C, Loida Quinonez   [facility]    [address] [place] [id number]   [phone]                   Routing refill request to provider for review/approval because:  Drug not on the [facility], [facility] or [facility] refill protocol or controlled substance

## 2017-09-18 NOTE — TELEPHONE ENCOUNTER
Attempted to reach out to patient via TTY number. Informed this is not a number to reach the patient, mother of the patient (Aysha), or to rescheduled an appointment for a patient. Nohemi Jennings RN, BSN

## 2017-09-18 NOTE — TELEPHONE ENCOUNTER
Katy Islas is a 27 year old female who presents with headache, cough, fever, vaginal itching, discharge.    NURSING ASSESSMENT:  Description:  Pt came into the office today hoping to be seen today.  She does have an appointment scheduled tomorrow.  Pt is complaining of a fever (99.5), cough, vaginal itching, headache.  Onset/duration:  2 weeks  Precip. factors:  none  Associated symptoms:  none  Improves/worsens symptoms:  Headache, productive cough, fever, vaginal itching, vaginal discharge  Pain scale (0-10)   3/10    Allergies:   Allergies   Allergen Reactions     Ativan [Lorazepam] Hives     At the IV site     Baclofen      hives     Bees Hives, Swelling and Difficulty breathing     Caffeine      Contrast Dye      Hives,   Updated 5/10/2016 CT Contrast.     Diazepam      IV form makes her become aggressive and angry     Iodine Hives     Methocarbamol Swelling     Metoclopramide      Other reaction(s): Tremors  LW Reaction: shaking/sweating     Midazolam      Other reaction(s): Agitation     Monosodium Glutamate      Morphine Other (See Comments)     Difficulty with urination     Nsaids Other (See Comments)     GI BLEED x2     Other (Do Not Use) Other (See Comments)     Xanaflex- pt becomes disoriented and loses bladder control     Percocet [Oxycodone-Acetaminophen] Itching     Reglan [Metoclopramide Hcl] Other (See Comments)     shaking     Soma [Carisoprodol] Visual Disturbance     Sleep walking     Tizanidine      Topamax Other (See Comments)     Topamax [Topiramate] Nausea     Tingling  GI/Vomit     Tramadol      Severe Headache, Seizure Risk     Tylenol W/Codeine [Acetaminophen-Codeine] Nausea and Itching     Tylenol 3     Valium Other (See Comments)     Becomes aggressive and angry     Versed Other (See Comments)     Zolmitriptan      Makes face feel like its twitching     Droperidol Anxiety     Flu Virus Vaccine Rash      Arm swelling       RECOMMENDED DISPOSITION:  See in 24 hours - scheduled   Next  5 appointments (look out 90 days)     Sep 19, 2017  1:00 PM CDT   Office Visit with Aura Rosario PA-C   [facility]    [address] [id number] [phone]                Will comply with recommendation: Yes  If further questions/concerns or if symptoms do not improve, worsen or new symptoms develop, call your PCP or [facility] as soon as possible.      Guideline used: cough, fever, HA, vaginal discharge/pain/itching  Telephone Triage Protocols for Nurses, Fifth Edition, Telma Garcia RN

## 2017-09-19 ENCOUNTER — OFFICE VISIT (OUTPATIENT)
Dept: FAMILY MEDICINE | Facility: OTHER | Age: 27
End: 2017-09-19
Payer: MEDICARE

## 2017-09-19 ENCOUNTER — MYC MEDICAL ADVICE (OUTPATIENT)
Dept: OBGYN | Facility: OTHER | Age: 27
End: 2017-09-19

## 2017-09-19 VITALS
WEIGHT: 157 LBS | RESPIRATION RATE: 16 BRPM | HEART RATE: 120 BPM | DIASTOLIC BLOOD PRESSURE: 70 MMHG | SYSTOLIC BLOOD PRESSURE: 110 MMHG | TEMPERATURE: 99 F | BODY MASS INDEX: 28.72 KG/M2 | OXYGEN SATURATION: 98 %

## 2017-09-19 DIAGNOSIS — J45.30 MILD PERSISTENT ASTHMA WITHOUT COMPLICATION: ICD-10-CM

## 2017-09-19 DIAGNOSIS — D22.9 NUMEROUS MOLES: ICD-10-CM

## 2017-09-19 DIAGNOSIS — G89.4 CHRONIC PAIN SYNDROME: ICD-10-CM

## 2017-09-19 DIAGNOSIS — M51.16 LUMBAR DISC DISEASE WITH RADICULOPATHY: ICD-10-CM

## 2017-09-19 DIAGNOSIS — B37.31 YEAST INFECTION OF THE VAGINA: ICD-10-CM

## 2017-09-19 DIAGNOSIS — M54.42 CHRONIC BILATERAL LOW BACK PAIN WITH LEFT-SIDED SCIATICA: ICD-10-CM

## 2017-09-19 DIAGNOSIS — R30.0 DYSURIA: ICD-10-CM

## 2017-09-19 DIAGNOSIS — R07.0 THROAT PAIN: ICD-10-CM

## 2017-09-19 DIAGNOSIS — N39.0 ACUTE UTI: Primary | ICD-10-CM

## 2017-09-19 DIAGNOSIS — G89.29 CHRONIC BILATERAL LOW BACK PAIN WITH LEFT-SIDED SCIATICA: ICD-10-CM

## 2017-09-19 DIAGNOSIS — R05.9 COUGH: ICD-10-CM

## 2017-09-19 LAB
ALBUMIN UR-MCNC: NEGATIVE MG/DL
APPEARANCE UR: CLEAR
BACTERIA #/AREA URNS HPF: ABNORMAL /HPF
BILIRUB UR QL STRIP: NEGATIVE
COLOR UR AUTO: YELLOW
GLUCOSE UR STRIP-MCNC: NEGATIVE MG/DL
HGB UR QL STRIP: NEGATIVE
KETONES UR STRIP-MCNC: NEGATIVE MG/DL
LEUKOCYTE ESTERASE UR QL STRIP: ABNORMAL
NITRATE UR QL: NEGATIVE
NON-SQ EPI CELLS #/AREA URNS LPF: ABNORMAL /LPF
PH UR STRIP: 6.5 PH (ref 5–7)
RBC #/AREA URNS AUTO: ABNORMAL /HPF
SOURCE: ABNORMAL
SP GR UR STRIP: 1.02 (ref 1–1.03)
SPECIMEN SOURCE: NORMAL
UROBILINOGEN UR STRIP-ACNC: 0.2 EU/DL (ref 0.2–1)
WBC #/AREA URNS AUTO: ABNORMAL /HPF
WET PREP SPEC: NORMAL

## 2017-09-19 PROCEDURE — 81001 URINALYSIS AUTO W/SCOPE: CPT | Performed by: PHYSICIAN ASSISTANT

## 2017-09-19 PROCEDURE — 99215 OFFICE O/P EST HI 40 MIN: CPT | Performed by: PHYSICIAN ASSISTANT

## 2017-09-19 PROCEDURE — T1013 SIGN LANG/ORAL INTERPRETER: HCPCS | Mod: U3

## 2017-09-19 PROCEDURE — 87210 SMEAR WET MOUNT SALINE/INK: CPT | Performed by: PHYSICIAN ASSISTANT

## 2017-09-19 RX ORDER — DIAZEPAM 5 MG
2.5-5 TABLET ORAL EVERY 12 HOURS PRN
Qty: 60 TABLET | Refills: 3 | Status: SHIPPED | OUTPATIENT
Start: 2017-09-19 | End: 2017-10-26

## 2017-09-19 RX ORDER — SULFAMETHOXAZOLE/TRIMETHOPRIM 800-160 MG
1 TABLET ORAL 2 TIMES DAILY
Qty: 20 TABLET | Refills: 0 | Status: SHIPPED | OUTPATIENT
Start: 2017-09-19 | End: 2017-09-29

## 2017-09-19 RX ORDER — CODEINE PHOSPHATE AND GUAIFENESIN 10; 100 MG/5ML; MG/5ML
2 SOLUTION ORAL EVERY 6 HOURS PRN
Qty: 120 ML | Refills: 0 | Status: SHIPPED | OUTPATIENT
Start: 2017-09-19 | End: 2017-10-17

## 2017-09-19 RX ORDER — FLUCONAZOLE 150 MG/1
150 TABLET ORAL ONCE
Qty: 2 TABLET | Refills: 0 | Status: SHIPPED | OUTPATIENT
Start: 2017-09-19 | End: 2017-09-19

## 2017-09-19 RX ORDER — GUAIFENESIN 600 MG/1
1200 TABLET, EXTENDED RELEASE ORAL 2 TIMES DAILY PRN
Qty: 40 TABLET | Refills: 0 | Status: CANCELLED | OUTPATIENT
Start: 2017-09-19

## 2017-09-19 NOTE — PATIENT INSTRUCTIONS
Urinary Tract Infection         What is a urinary tract infection?   A urinary tract infection (UTI) is an infection in the urinary tract. The urinary tract includes the:   kidneys   ureters (the tubes draining urine from the kidneys to the bladder)   bladder   urethra (the tube that drains urine from the bladder).   Any or all of these parts of the urinary tract can get infected.   How does it occur?   Urinary tract infection is usually caused by bacteria. Normally the urinary tract does not have any bacteria or other organisms in it. Bacteria that cause UTI often spread from the rectum or vagina to the urethra and then to the bladder or kidneys. Urinary tract infection is more common in women than men because the urethra is shorter in women. This makes it easier for bacteria to move up to the bladder. Sometimes bacteria spread from another part of the body through the bloodstream to the urinary tract.   Some of the things that can lead to an infection are:   a blockage in the urinary tract, such as a kidney stone   sexual activity   getting older, when it may get harder to empty and flush out the bladder completely.   Women are more likely to have an infection if they:   are newly sexually active or have a new sex partner   are past menopause   are pregnant   have a history of diabetes, a problem with the immune system, sickle-cell anemia, stroke, kidney stones, or any illness that makes it hard to empty the bladder completely.   What are the symptoms?   The symptoms of UTI may include:   urinating more often   feeling an urgent need to urinate   pain or discomfort (burning) when you urinate   urine that smells bad   pain in the lower pelvis, stomach, lower back, or side   urine that looks cloudy or reddish   fever or chills   sweats   nausea and vomiting   leaking of urine   change in amount of urine, either more or less   pain during sex.   How is it diagnosed?   Your healthcare provider will ask  about your symptoms and medical history. Your provider will examine you. The exam may include a pelvic exam. Your provider will check for tenderness of the bladder or kidney. A sample of your urine may be tested for bacteria and pus. If you are having fever and are feeling very ill, you may have a blood test to look for signs of more serious infection.   If you keep having infections or symptoms after treatment, your provider may suggest these tests:   An intravenous pyelogram (IVP). An IVP is a special type of X-ray of the kidneys, ureters, and bladder.   An ultrasound scan to look at the urinary tract.   A cystoscopy. This is an exam of the inside of the urethra and bladder with a small lighted instrument. It is usually done by a specialist called a urologist.   How is it treated?   UTIs are usually treated with antibiotics. Your provider can also prescribe a medicine called Pyridium to relieve burning and discomfort. (Pyridium turns your urine a dark orange color.)   If the infection is causing fever, pain, or vomiting or you have a severe kidney infection, you may need to stay at the hospital for treatment.   How long will the effects last?   With antibiotic treatment, the symptoms of a bacterial infection stop in 1 to 3 days. Take all of the antibiotic your healthcare provider prescribes, even after the symptoms go away. If you stop taking your medicine before the scheduled end of treatment, the infection may come back   Without treatment, the infection can last a long time. If it is not treated, the infection can permanently damage the bladder and kidneys, or it may spread to the blood. If the infection spreads to the blood, it can be fatal.   How can I take care of myself?   Follow your healthcare provider's treatment. Take all of the antibiotic that your healthcare provider prescribes, even when you feel better. Do not take medicine left over from previous prescriptions.   Drink more fluids, especially  water, to help flush bacteria from your system.   If you have a fever:   Take aspirin or acetaminophen to control the fever. Check with your healthcare provider before you give any medicine that contains aspirin or salicylates to a child or teen. This includes medicines like baby aspirin, some cold medicines, and Pepto Bismol. Children and teens who take aspirin are at risk for a serious illness called Reye's syndrome.   Keep a daily record of your temperature.   A hot water bottle or an electric heating pad on a low setting can help relieve cramps or lower abdominal or back pain. Keep a cloth between your skin and the hot water bottle or heating pad so that you don't burn your skin.   Soaking in a tub for 20 to 30 minutes may help relieve any back or abdominal pain.   Follow your healthcare provider's directions for a follow-up urine test. Your provider may want to test your urine soon after you finish taking the antibiotic.   Call your healthcare provider right away if:   You keep having symptoms after taking an antibiotic for 2 days.   Your symptoms get worse.   You have a fever of 101.5? F (38.6? C) or higher.   You have new vomiting.   You have new pain in your side, back, or belly.   You have any symptoms that worry you.   How can I help prevent urinary tract infection?   You can help prevent UTIs if you:   Drink lots of fluids every day.   Don't wait to go to the bathroom when you feel the need to urinate.   Empty your bladder completely when you urinate.   Use good hygiene when you use the toilet. For example, wipe from front to back to keep rectal bacteria from getting into the vagina and urethra.   Avoid using irritating cosmetics or chemicals in the area of the vagina and urethra (such as strong soaps, feminine hygiene sprays or douches, or scented napkins or panty liners).   Practice safe sex. Always use latex or polyurethane condoms.   Urinate soon after sex.   Keep your genital area clean.   Wear  underwear that is all cotton or has a cotton crotch. Pantyhose should also have a cotton crotch. Cotton allows better air circulation than nylon. Change underwear and pantyhose every day.     Published by [employer].  This content is reviewed periodically and is subject to change as new health information becomes available. The information is intended to inform and educate and is not a replacement for medical evaluation, advice, diagnosis or treatment by a healthcare professional.   Developed by Judy Anderson RN, [place], and [facility].   ? 2010 [facility] and/or its affiliates. All Rights Reserved.   Copyright   [employer] 2011

## 2017-09-19 NOTE — MR AVS SNAPSHOT
After Visit Summary   9/19/2017    Katy Islas    MRN: [id number]           Patient Information     Date Of Birth          1990        Visit Information        Provider Department      9/19/2017 1:00 PM Loida Quinonez; Aura Rosario, [facility]        Today's Diagnoses     Acute UTI    -  1    Dysuria        Throat pain        Yeast infection of the vagina        Cough          Care Instructions                Urinary Tract Infection         What is a urinary tract infection?   A urinary tract infection (UTI) is an infection in the urinary tract. The urinary tract includes the:   kidneys   ureters (the tubes draining urine from the kidneys to the bladder)   bladder   urethra (the tube that drains urine from the bladder).   Any or all of these parts of the urinary tract can get infected.   How does it occur?   Urinary tract infection is usually caused by bacteria. Normally the urinary tract does not have any bacteria or other organisms in it. Bacteria that cause UTI often spread from the rectum or vagina to the urethra and then to the bladder or kidneys. Urinary tract infection is more common in women than men because the urethra is shorter in women. This makes it easier for bacteria to move up to the bladder. Sometimes bacteria spread from another part of the body through the bloodstream to the urinary tract.   Some of the things that can lead to an infection are:   a blockage in the urinary tract, such as a kidney stone   sexual activity   getting older, when it may get harder to empty and flush out the bladder completely.   Women are more likely to have an infection if they:   are newly sexually active or have a new sex partner   are past menopause   are pregnant   have a history of diabetes, a problem with the immune system, sickle-cell anemia, stroke, kidney stones, or any illness that makes it hard to empty the bladder completely.   What are the  symptoms?   The symptoms of UTI may include:   urinating more often   feeling an urgent need to urinate   pain or discomfort (burning) when you urinate   urine that smells bad   pain in the lower pelvis, stomach, lower back, or side   urine that looks cloudy or reddish   fever or chills   sweats   nausea and vomiting   leaking of urine   change in amount of urine, either more or less   pain during sex.   How is it diagnosed?   Your healthcare provider will ask about your symptoms and medical history. Your provider will examine you. The exam may include a pelvic exam. Your provider will check for tenderness of the bladder or kidney. A sample of your urine may be tested for bacteria and pus. If you are having fever and are feeling very ill, you may have a blood test to look for signs of more serious infection.   If you keep having infections or symptoms after treatment, your provider may suggest these tests:   An intravenous pyelogram (IVP). An IVP is a special type of X-ray of the kidneys, ureters, and bladder.   An ultrasound scan to look at the urinary tract.   A cystoscopy. This is an exam of the inside of the urethra and bladder with a small lighted instrument. It is usually done by a specialist called a urologist.   How is it treated?   UTIs are usually treated with antibiotics. Your provider can also prescribe a medicine called Pyridium to relieve burning and discomfort. (Pyridium turns your urine a dark orange color.)   If the infection is causing fever, pain, or vomiting or you have a severe kidney infection, you may need to stay at the hospital for treatment.   How long will the effects last?   With antibiotic treatment, the symptoms of a bacterial infection stop in 1 to 3 days. Take all of the antibiotic your healthcare provider prescribes, even after the symptoms go away. If you stop taking your medicine before the scheduled end of treatment, the infection may come back   Without treatment, the infection  can last a long time. If it is not treated, the infection can permanently damage the bladder and kidneys, or it may spread to the blood. If the infection spreads to the blood, it can be fatal.   How can I take care of myself?   Follow your healthcare provider's treatment. Take all of the antibiotic that your healthcare provider prescribes, even when you feel better. Do not take medicine left over from previous prescriptions.   Drink more fluids, especially water, to help flush bacteria from your system.   If you have a fever:   Take aspirin or acetaminophen to control the fever. Check with your healthcare provider before you give any medicine that contains aspirin or salicylates to a child or teen. This includes medicines like baby aspirin, some cold medicines, and Pepto Bismol. Children and teens who take aspirin are at risk for a serious illness called Reye's syndrome.   Keep a daily record of your temperature.   A hot water bottle or an electric heating pad on a low setting can help relieve cramps or lower abdominal or back pain. Keep a cloth between your skin and the hot water bottle or heating pad so that you don't burn your skin.   Soaking in a tub for 20 to 30 minutes may help relieve any back or abdominal pain.   Follow your healthcare provider's directions for a follow-up urine test. Your provider may want to test your urine soon after you finish taking the antibiotic.   Call your healthcare provider right away if:   You keep having symptoms after taking an antibiotic for 2 days.   Your symptoms get worse.   You have a fever of 101.5? F (38.6? C) or higher.   You have new vomiting.   You have new pain in your side, back, or belly.   You have any symptoms that worry you.   How can I help prevent urinary tract infection?   You can help prevent UTIs if you:   Drink lots of fluids every day.   Don't wait to go to the bathroom when you feel the need to urinate.   Empty your bladder completely when you urinate.    Use good hygiene when you use the toilet. For example, wipe from front to back to keep rectal bacteria from getting into the vagina and urethra.   Avoid using irritating cosmetics or chemicals in the area of the vagina and urethra (such as strong soaps, feminine hygiene sprays or douches, or scented napkins or panty liners).   Practice safe sex. Always use latex or polyurethane condoms.   Urinate soon after sex.   Keep your genital area clean.   Wear underwear that is all cotton or has a cotton crotch. Pantyhose should also have a cotton crotch. Cotton allows better air circulation than nylon. Change underwear and pantyhose every day.     Published by [employer].  This content is reviewed periodically and is subject to change as new health information becomes available. The information is intended to inform and educate and is not a replacement for medical evaluation, advice, diagnosis or treatment by a healthcare professional.   Developed by Judy Anderson RN, [place], and [employer].   ? 2010 [facility] [facility] and/or its affiliates. All Rights Reserved.   Copyright   [employer] Reference Systems 2011                  Follow-ups after your visit        Who to contact     If you have questions or need follow up information about today's clinic visit or your schedule please contact [facility] directly at [phone].  Normal or non-critical lab and imaging results will be communicated to you by MyChart, letter or phone within 4 business days after the clinic has received the results. If you do not hear from us within 7 days, please contact the clinic through [employer] or phone. If you have a critical or abnormal lab result, we will notify you by phone as soon as possible.  Submit refill requests through [employer] or call your pharmacy and they will forward the refill request to us. Please allow 3 business days for your refill to be completed.          Additional Information About Your Visit        [employer]  Information     [employer] gives you secure access to your electronic health record. If you see a primary care provider, you can also send messages to your care team and make appointments. If you have questions, please call your primary care clinic.  If you do not have a primary care provider, please call [phone] and they will assist you.        Care EveryWhere ID     This is your Care EveryWhere ID. This could be used by other organizations to access your [place] medical records  [id number]        Your Vitals Were     Pulse Temperature Respirations Pulse Oximetry BMI (Body Mass Index)       120 99  F (37.2  C) (Oral) 16 98% 28.72 kg/m2        Blood Pressure from Last 3 Encounters:   09/19/17 110/70   09/11/17 118/80   08/28/17 119/83    Weight from Last 3 Encounters:   09/19/17 157 lb (71.2 kg)   09/11/17 151 lb (68.5 kg)   08/28/17 154 lb (69.9 kg)              We Performed the Following     *UA reflex to Microscopic and Culture ([facility] (except [place] and [place])     Strep, Rapid Screen     Urine Microscopic     Wet prep          Today's Medication Changes          These changes are accurate as of: 9/19/17  1:44 PM.  If you have any questions, ask your nurse or doctor.               Start taking these medicines.        Dose/Directions    fluconazole 150 MG tablet   Commonly known as:  DIFLUCAN   Used for:  Yeast infection of the vagina   Started by:  Aura Rosario PA-C        Dose:  150 mg   Take 1 tablet (150 mg) by mouth once for 1 dose May repeat after 3 days   Quantity:  2 tablet   Refills:  0       guaiFENesin-codeine 100-10 MG/5ML Soln solution   Commonly known as:  ROBITUSSIN AC   Used for:  Cough   Started by:  Aura Rosario PA-C        Dose:  2 tsp.   Take 10 mLs by mouth every 6 hours as needed for cough   Quantity:  120 mL   Refills:  0       sulfamethoxazole-trimethoprim 800-160 MG per tablet   Commonly known as:  BACTRIM DS/SEPTRA DS    Used for:  Acute UTI   Started by:  Aura Rosario PA-C        Dose:  1 tablet   Take 1 tablet by mouth 2 times daily   Quantity:  20 tablet   Refills:  0            Where to get your medicines      These medications were sent to [id number] - [place], [place] - [address] AT [facility] [address]  [address] [address], [facility] [address]     Phone:  [phone]     fluconazole 150 MG tablet    sulfamethoxazole-trimethoprim 800-160 MG per tablet         Some of these will need a paper prescription and others can be bought over the counter.  Ask your nurse if you have questions.     Bring a paper prescription for each of these medications     guaiFENesin-codeine 100-10 MG/5ML Soln solution                Primary Care Provider Office Phone # Fax #    Aura Rosario PA-C [phone]       [address] [place] [address] [address]  [facility] [address]        Equal Access to Services     VIKY VILLANUEVA AH: Hadii aad ku hadasho Soomaali, waaxda luqadaha, qaybta kaalmada adeegyada, waxay idiin haygeraldon norberto santo . So [facility] [phone].    ATENCIÓN: Si habla español, tiene a kerr disposición servicios gratuitos de asistencia lingüística. [facility] [phone].    We comply with applicable federal civil rights laws and [place] laws. We do not discriminate on the basis of race, color, national origin, age, disability sex, sexual orientation or gender identity.            Thank you!     Thank you for choosing [facility]  for your care. Our goal is always to provide you with excellent care. Hearing back from our patients is one way we can continue to improve our services. Please take a few minutes to complete the written survey that you may receive in the mail after your visit with us. Thank you!             Your Updated Medication List - Protect others around you: Learn how to safely use, store and throw away your medicines at www.disposemymeds.org.           This list is accurate as of: 9/19/17  1:44 PM.  Always use your most recent med list.                   Brand Name Dispense Instructions for use Diagnosis    * albuterol 108 (90 BASE) MCG/ACT Inhaler    PROAIR HFA/PROVENTIL HFA/VENTOLIN HFA    1 Inhaler    Inhale 2 puffs into the lungs every 6 hours as needed for shortness of breath / dyspnea or wheezing    Mild persistent asthma without complication       * albuterol (2.5 MG/3ML) 0.083% neb solution     50 vial    Take 1 vial (2.5 mg) by nebulization every 6 hours as needed for shortness of breath / dyspnea or wheezing    Mild persistent asthma without complication       ARIPiprazole 5 MG tablet    ABILIFY    30 tablet    Take 1 tablet (5 mg) by mouth At Bedtime    Adjustment disorder with mixed anxiety and depressed mood, Anxiety       cetirizine 10 MG tablet    zyrTEC     Take 10 mg by mouth daily        cyclobenzaprine 10 MG tablet    FLEXERIL    60 tablet    Take 2 tablets (20 mg) by mouth nightly as needed for muscle spasms or other (back pain)    Chronic bilateral low back pain with left-sided sciatica       dexlansoprazole 60 MG Cpdr CR capsule    DEXILANT    30 capsule    Take 1 capsule (60 mg) by mouth daily    Gastroesophageal reflux disease without esophagitis       diazepam 5 MG tablet    VALIUM    60 tablet    Take 0.5-1 tablets (2.5-5 mg) by mouth every 12 hours as needed for anxiety or sleep (MUSCLE SPASM) (one month supply)    Chronic pain syndrome, Chronic bilateral low back pain with left-sided sciatica, Lumbar disc disease with radiculopathy, S/P lumbar fusion       dicyclomine 20 MG tablet    BENTYL    30 tablet    Take 1 tablet (20 mg) by mouth 4 times daily as needed (ABDOMINAL CRAMPING) AS NEEDED FOR CRAMPING    Hx of Crohn's disease       DULoxetine 60 MG EC capsule    CYMBALTA    30 capsule    Take 1 capsule (60 mg) by mouth daily    Adjustment disorder with mixed anxiety and depressed mood       EPINEPHrine 0.3 MG/0.3ML injection  2-pack    EPIPEN/ADRENACLICK/or ANY BX GENERIC EQUIV    0.6 mL    Inject 0.3 mLs (0.3 mg) into the muscle once as needed for anaphylaxis    Bee sting allergy       FLEX-A-MIN JOINT FLEX PO      Take by mouth daily Reported on 3/28/2017        fluconazole 150 MG tablet    DIFLUCAN    2 tablet    Take 1 tablet (150 mg) by mouth once for 1 dose May repeat after 3 days    Yeast infection of the vagina       fluticasone 50 MCG/ACT spray    FLONASE    1 Bottle    Spray 2 sprays into both nostrils daily    Seasonal allergic rhinitis due to pollen       gabapentin 300 MG capsule    NEURONTIN    90 capsule    Take 1 capsule (300 mg) by mouth 3 times daily    Chronic bilateral low back pain with left-sided sciatica       guaiFENesin-codeine 100-10 MG/5ML Soln solution    ROBITUSSIN AC    120 mL    Take 10 mLs by mouth every 6 hours as needed for cough    Cough       HYDROcodone-acetaminophen 5-325 MG per tablet   Start taking on:  9/25/2017    NORCO    60 tablet    Take 1 tablet by mouth every 4 hours as needed for pain maximum 4 tablet(s) per day    Chronic pain syndrome, Chronic bilateral low back pain with left-sided sciatica, S/P lumbar fusion, Lumbar disc disease with radiculopathy       lidocaine 5 % Patch    LIDODERM    30 patch    Apply up to 3 patches to painful area at once for up to 12 h within a 24 h period.  Remove after 12 hours.    Lumbar disc disease with radiculopathy       meclizine 12.5 MG tablet    ANTIVERT    30 tablet    Take 1 tablet (12.5 mg) by mouth 4 times daily as needed for dizziness    Dizziness       medical cannabis inhalation (Patient's own supply.  Not a prescription)      Inhale into the lungs 3 times daily as needed Reported on 3/28/2017        medroxyPROGESTERone 150 MG/ML injection    DEPO-PROVERA    3 mL    Inject 1 mL (150 mg) into the muscle every 3 months    Encounter for surveillance of injectable contraceptive, Dysmenorrhea       Multi-vitamin Tabs tablet   Generic drug:   multivitamin, therapeutic with minerals      Take 1 tablet by mouth daily Reported on 3/28/2017        ondansetron 4 MG ODT tab    ZOFRAN ODT    60 tablet    Take 1-2 tablets (4-8 mg) by mouth every 6 hours as needed for nausea Take 1-2 tablets by mouth as needed for nausea    Nausea       order for DME     1 each    Nebulizer with mask and tubing    Mild persistent asthma without complication       pantoprazole 40 MG EC tablet    PROTONIX    90 tablet    Take 1 tablet (40 mg) by mouth daily Take 30-60 minutes before a meal.    Gastroesophageal reflux disease without esophagitis       promethazine 25 MG tablet    PHENERGAN    60 tablet    Take 1 tablet (25 mg) by mouth every 6 hours as needed for nausea    Hx of Crohn's disease       rizatriptan 5 MG ODT tab    MAXALT-MLT    18 tablet    Take 1-2 tablets (5-10 mg) by mouth at onset of headache for migraine May repeat in 2 hours. Max 6 tablets/24 hours.    Migraine with aura and without status migrainosus, not intractable       senna-docusate 8.6-50 MG per tablet    SENOKOT-S;PERICOLACE    60 tablet    Take 1 tablet by mouth 2 times daily as needed for constipation    Drug-induced constipation       sucralfate 1 GM/10ML suspension    CARAFATE    420 mL    Take 10 mLs (1 g) by mouth 4 times daily (before meals and nightly)    Abdominal pain, epigastric, Hematemesis with nausea       sulfamethoxazole-trimethoprim 800-160 MG per tablet    BACTRIM DS/SEPTRA DS    20 tablet    Take 1 tablet by mouth 2 times daily    Acute UTI       * Notice:  This list has 2 medication(s) that are the same as other medications prescribed for you. Read the directions carefully, and ask your doctor or other care provider to review them with you.

## 2017-09-19 NOTE — TELEPHONE ENCOUNTER
Medication(s) reviewed and approved. Rx. Was signed and placed in MA task. Please fax.       Please notify that this has been done.      Please close the encounter when finished with it.     Aura Rosario PA-C

## 2017-09-19 NOTE — NURSING NOTE
"Chief Complaint   Patient presents with     Vaginal Problem     URI     Panel Management       Initial /70 (BP Location: Right arm, Patient Position: Chair, Cuff Size: Adult Regular)  Pulse 120  Temp 99  F (37.2  C) (Oral)  Resp 16  Wt 157 lb (71.2 kg)  SpO2 98%  BMI 28.72 kg/m2 Estimated body mass index is 28.72 kg/(m^2) as calculated from the following:    Height as of 7/1/17: 5' 2\" (1.575 m).    Weight as of this encounter: 157 lb (71.2 kg).  Medication Reconciliation: complete  "

## 2017-09-20 ENCOUNTER — TELEPHONE (OUTPATIENT)
Dept: FAMILY MEDICINE | Facility: OTHER | Age: 27
End: 2017-09-20

## 2017-09-20 ENCOUNTER — MYC MEDICAL ADVICE (OUTPATIENT)
Dept: FAMILY MEDICINE | Facility: OTHER | Age: 27
End: 2017-09-20

## 2017-09-20 NOTE — TELEPHONE ENCOUNTER
"Per last OV 09/19: \"OK'd early fill by 3 days for script patient had dated for next Monday 9/25/17 for 2 week supply \"    Per pharmacy, they refilled medication early last month. Pharmacy states she \"should have enough medication until October 1st\"  She states that if patient will be back from vacation before October 1st, that they won't be filling it early because they \"can't just keep refilling medications early for patient every month.\"    Will route this message to CDL to review/advise. Pharmacist aware CDL not in clinic until tomorrow so will call then with response.  Abimbola Castro, [facility]    "

## 2017-09-20 NOTE — TELEPHONE ENCOUNTER
Reason for Call:  Other prescription    Detailed comments: pharmacy calling to inform that they are not filling Norco due to it is to early again, unless they are leaving on vacation for another 11 dyas    Phone Number Patient can be reached at: Other phone number:  [employer], anyone can help    Best Time: any    Can we leave a detailed message on this number? Not Applicable    Call taken on 9/20/2017 at 9:06 AM by Sharon Veronica

## 2017-09-21 NOTE — TELEPHONE ENCOUNTER
Reviewed       Last fill was for Norco 9/11/17 for #60, takes 4 per day. This is a 15 day supply. Next fill due 9/25/17  So does not have enough to get her until 10/1/17     Called [facility] at 10 am and informed them someone did their math wrong. Due for fill on 9/25/17, ok for early fill by 3 days due to going to be gone until Monday or Tuesday.       Zach Rosario PA-C  [facility]

## 2017-09-21 NOTE — TELEPHONE ENCOUNTER
Please notify patient no repeat UA needed unless symptoms return or persist >1 week.   She should tear up or shred [place] script    Zach Tomlinson-LOVE Moss  [facility]

## 2017-09-25 ENCOUNTER — TRANSFERRED RECORDS (OUTPATIENT)
Dept: HEALTH INFORMATION MANAGEMENT | Facility: CLINIC | Age: 27
End: 2017-09-25

## 2017-09-29 ENCOUNTER — OFFICE VISIT (OUTPATIENT)
Dept: FAMILY MEDICINE | Facility: OTHER | Age: 27
End: 2017-09-29
Payer: MEDICARE

## 2017-09-29 VITALS
BODY MASS INDEX: 29.08 KG/M2 | DIASTOLIC BLOOD PRESSURE: 66 MMHG | OXYGEN SATURATION: 96 % | SYSTOLIC BLOOD PRESSURE: 96 MMHG | TEMPERATURE: 98.5 F | HEART RATE: 80 BPM | WEIGHT: 159 LBS

## 2017-09-29 DIAGNOSIS — M54.42 CHRONIC BILATERAL LOW BACK PAIN WITH LEFT-SIDED SCIATICA: ICD-10-CM

## 2017-09-29 DIAGNOSIS — M51.16 LUMBAR DISC DISEASE WITH RADICULOPATHY: ICD-10-CM

## 2017-09-29 DIAGNOSIS — Z98.1 S/P LUMBAR FUSION: ICD-10-CM

## 2017-09-29 DIAGNOSIS — G89.4 CHRONIC PAIN SYNDROME: Primary | ICD-10-CM

## 2017-09-29 DIAGNOSIS — J30.1 CHRONIC SEASONAL ALLERGIC RHINITIS DUE TO POLLEN: ICD-10-CM

## 2017-09-29 DIAGNOSIS — G89.29 CHRONIC BILATERAL LOW BACK PAIN WITH LEFT-SIDED SCIATICA: ICD-10-CM

## 2017-09-29 PROCEDURE — 99214 OFFICE O/P EST MOD 30 MIN: CPT | Performed by: PHYSICIAN ASSISTANT

## 2017-09-29 RX ORDER — HYDROCODONE BITARTRATE AND ACETAMINOPHEN 5; 325 MG/1; MG/1
1 TABLET ORAL EVERY 4 HOURS PRN
Qty: 60 TABLET | Refills: 0 | Status: SHIPPED | OUTPATIENT
Start: 2017-10-06 | End: 2017-09-29

## 2017-09-29 RX ORDER — OXYCODONE HYDROCHLORIDE 5 MG/1
5 TABLET ORAL EVERY 6 HOURS PRN
Qty: 56 TABLET | Refills: 0 | Status: SHIPPED | OUTPATIENT
Start: 2017-10-06 | End: 2017-10-24

## 2017-09-29 RX ORDER — HYDROCODONE BITARTRATE AND ACETAMINOPHEN 5; 325 MG/1; MG/1
1 TABLET ORAL EVERY 4 HOURS PRN
Qty: 60 TABLET | Refills: 0 | Status: SHIPPED | OUTPATIENT
Start: 2017-10-20 | End: 2017-09-29

## 2017-09-29 RX ORDER — CETIRIZINE HYDROCHLORIDE 10 MG/1
10 TABLET ORAL EVERY EVENING
Qty: 90 TABLET | Refills: 3 | Status: SHIPPED | OUTPATIENT
Start: 2017-09-29 | End: 2018-01-15

## 2017-09-29 ASSESSMENT — PAIN SCALES - GENERAL: PAINLEVEL: MILD PAIN (3)
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
## 2017-09-29 NOTE — NURSING NOTE
"No chief complaint on file.      Initial BP 96/66 (BP Location: Left arm, Patient Position: Chair, Cuff Size: Adult Regular)  Pulse 80  Temp 98.5  F (36.9  C) (Oral)  Wt 159 lb (72.1 kg)  SpO2 96%  BMI 29.08 kg/m2 Estimated body mass index is 29.08 kg/(m^2) as calculated from the following:    Height as of 7/1/17: 5' 2\" (1.575 m).    Weight as of this encounter: 159 lb (72.1 kg).  Medication Reconciliation: complete  "

## 2017-09-29 NOTE — MR AVS SNAPSHOT
After Visit Summary   9/29/2017    Katy Islas    MRN: [id number]           Patient Information     Date Of Birth          1990        Visit Information        Provider Department      9/29/2017 10:30 AM Aura Rosario PA-C; [facility]        Today's Diagnoses     Chronic pain syndrome    -  1    Chronic bilateral low back pain with left-sided sciatica        S/P lumbar fusion        Lumbar disc disease with radiculopathy        Chronic seasonal allergic rhinitis due to pollen           Follow-ups after your visit        Your next 10 appointments already scheduled     Oct 12, 2017  1:15 PM CDT   Office Visit with Aura Rosario PA-C   [facility] ([facility])    [address] [id number] [phone]           Bring a current list of meds and any records pertaining to this visit. For Physicals, please bring immunization records and any forms needing to be filled out. Please arrive 10 minutes early to complete paperwork.              Who to contact     If you have questions or need follow up information about today's clinic visit or your schedule please contact [facility] directly at [phone].  Normal or non-critical lab and imaging results will be communicated to you by MyChart, letter or phone within 4 business days after the clinic has received the results. If you do not hear from us within 7 days, please contact the clinic through [employer] or phone. If you have a critical or abnormal lab result, we will notify you by phone as soon as possible.  Submit refill requests through [employer] or call your pharmacy and they will forward the refill request to us. Please allow 3 business days for your refill to be completed.          Additional Information About Your Visit        [employer] Information     [employer] gives you secure access to your electronic health record. If you  see a primary care provider, you can also send messages to your care team and make appointments. If you have questions, please call your primary care clinic.  If you do not have a primary care provider, please call [phone] and they will assist you.        Care EveryWhere ID     This is your Care EveryWhere ID. This could be used by other organizations to access your [place] medical records  [id number]        Your Vitals Were     Pulse Temperature Pulse Oximetry BMI (Body Mass Index)          80 98.5  F (36.9  C) (Oral) 96% 29.08 kg/m2         Blood Pressure from Last 3 Encounters:   09/29/17 96/66   09/19/17 110/70   09/11/17 118/80    Weight from Last 3 Encounters:   09/29/17 159 lb (72.1 kg)   09/19/17 157 lb (71.2 kg)   09/11/17 151 lb (68.5 kg)              Today, you had the following     No orders found for display         Today's Medication Changes          These changes are accurate as of: 9/29/17 11:36 AM.  If you have any questions, ask your nurse or doctor.               Start taking these medicines.        Dose/Directions    oxyCODONE 5 MG IR tablet   Commonly known as:  ROXICODONE   Used for:  Chronic pain syndrome, Chronic bilateral low back pain with left-sided sciatica, S/P lumbar fusion, Lumbar disc disease with radiculopathy   Started by:  Aura Rosario PA-C        Dose:  5 mg   Start taking on:  10/6/2017   Take 1 tablet (5 mg) by mouth every 6 hours as needed for pain maximum 4 tablet(s) per day   Quantity:  56 tablet   Refills:  0         These medicines have changed or have updated prescriptions.        Dose/Directions    * cetirizine 10 MG tablet   Commonly known as:  zyrTEC   This may have changed:  Another medication with the same name was added. Make sure you understand how and when to take each.        Dose:  10 mg   Take 10 mg by mouth daily   Refills:  0       * cetirizine 10 MG tablet   Commonly known as:  zyrTEC   This may have changed:  You were already  taking a medication with the same name, and this prescription was added. Make sure you understand how and when to take each.   Used for:  Chronic seasonal allergic rhinitis due to pollen   Changed by:  Aura Rosario PA-C        Dose:  10 mg   Take 1 tablet (10 mg) by mouth every evening   Quantity:  90 tablet   Refills:  3       * Notice:  This list has 2 medication(s) that are the same as other medications prescribed for you. Read the directions carefully, and ask your doctor or other care provider to review them with you.         Where to get your medicines      These medications were sent to [facility] [address] [place] [address] [facility] AT [facility] [address]  [address] [facility], [facility] [address]     Phone:  [phone]     cetirizine 10 MG tablet         Some of these will need a paper prescription and others can be bought over the counter.  Ask your nurse if you have questions.     Bring a paper prescription for each of these medications     oxyCODONE 5 MG IR tablet                Primary Care Provider Office Phone # Fax #    Aura Rosario PA-C [phone]       [address] [facility] [address]  [facility] [address]        Equal Access to Services     VIKY VILLANUEVA AH: Hadii pilo bishop hadasho Soomaali, waaxda luqadaha, qaybta kaalmada adeegyada, susan turner. So [facility] [phone].    ATENCIÓN: Si habla español, tiene a kerr disposición servicios gratuitos de asistencia lingüística. Llame al [phone].    We comply with applicable federal civil rights laws and [place] laws. We do not discriminate on the basis of race, color, national origin, age, disability sex, sexual orientation or gender identity.            Thank you!     Thank you for choosing [facility]  for your care. Our goal is always to provide you with excellent care. Hearing back from our patients is one way we can continue to improve our  services. Please take a few minutes to complete the written survey that you may receive in the mail after your visit with us. Thank you!             Your Updated Medication List - Protect others around you: Learn how to safely use, store and throw away your medicines at www.disposemymeds.org.          This list is accurate as of: 9/29/17 11:36 AM.  Always use your most recent med list.                   Brand Name Dispense Instructions for use Diagnosis    * albuterol 108 (90 BASE) MCG/ACT Inhaler    PROAIR HFA/PROVENTIL HFA/VENTOLIN HFA    1 Inhaler    Inhale 2 puffs into the lungs every 6 hours as needed for shortness of breath / dyspnea or wheezing    Mild persistent asthma without complication       * albuterol (2.5 MG/3ML) 0.083% neb solution     50 vial    Take 1 vial (2.5 mg) by nebulization every 6 hours as needed for shortness of breath / dyspnea or wheezing    Mild persistent asthma without complication       ARIPiprazole 5 MG tablet    ABILIFY    30 tablet    Take 1 tablet (5 mg) by mouth At Bedtime    Adjustment disorder with mixed anxiety and depressed mood, Anxiety       * cetirizine 10 MG tablet    zyrTEC     Take 10 mg by mouth daily        * cetirizine 10 MG tablet    zyrTEC    90 tablet    Take 1 tablet (10 mg) by mouth every evening    Chronic seasonal allergic rhinitis due to pollen       cyclobenzaprine 10 MG tablet    FLEXERIL    60 tablet    Take 2 tablets (20 mg) by mouth nightly as needed for muscle spasms or other (back pain)    Chronic bilateral low back pain with left-sided sciatica       dexlansoprazole 60 MG Cpdr CR capsule    DEXILANT    30 capsule    Take 1 capsule (60 mg) by mouth daily    Gastroesophageal reflux disease without esophagitis       diazepam 5 MG tablet    VALIUM    60 tablet    Take 0.5-1 tablets (2.5-5 mg) by mouth every 12 hours as needed for anxiety or sleep (MUSCLE SPASM) (one month supply)    Chronic pain syndrome, Chronic bilateral low back pain with left-sided  sciatica, Lumbar disc disease with radiculopathy, S/P lumbar fusion       dicyclomine 20 MG tablet    BENTYL    30 tablet    Take 1 tablet (20 mg) by mouth 4 times daily as needed (ABDOMINAL CRAMPING) AS NEEDED FOR CRAMPING    Hx of Crohn's disease       DULoxetine 60 MG EC capsule    CYMBALTA    30 capsule    Take 1 capsule (60 mg) by mouth daily    Adjustment disorder with mixed anxiety and depressed mood       EPINEPHrine 0.3 MG/0.3ML injection 2-pack    EPIPEN/ADRENACLICK/or ANY BX GENERIC EQUIV    0.6 mL    Inject 0.3 mLs (0.3 mg) into the muscle once as needed for anaphylaxis    Bee sting allergy       FLEX-A-MIN JOINT FLEX PO      Take by mouth daily Reported on 3/28/2017        fluticasone 50 MCG/ACT spray    FLONASE    1 Bottle    Spray 2 sprays into both nostrils daily    Seasonal allergic rhinitis due to pollen       gabapentin 300 MG capsule    NEURONTIN    90 capsule    Take 1 capsule (300 mg) by mouth 3 times daily    Chronic bilateral low back pain with left-sided sciatica       guaiFENesin-codeine 100-10 MG/5ML Soln solution    ROBITUSSIN AC    120 mL    Take 10 mLs by mouth every 6 hours as needed for cough    Cough       lidocaine 5 % Patch    LIDODERM    30 patch    Apply up to 3 patches to painful area at once for up to 12 h within a 24 h period.  Remove after 12 hours.    Lumbar disc disease with radiculopathy       meclizine 12.5 MG tablet    ANTIVERT    30 tablet    Take 1 tablet (12.5 mg) by mouth 4 times daily as needed for dizziness    Dizziness       medical cannabis inhalation (Patient's own supply.  Not a prescription)      Inhale into the lungs 3 times daily as needed Reported on 3/28/2017        medroxyPROGESTERone 150 MG/ML injection    DEPO-PROVERA    3 mL    Inject 1 mL (150 mg) into the muscle every 3 months    Encounter for surveillance of injectable contraceptive, Dysmenorrhea       Multi-vitamin Tabs tablet   Generic drug:  multivitamin, therapeutic with minerals      Take 1  tablet by mouth daily Reported on 3/28/2017        ondansetron 4 MG ODT tab    ZOFRAN ODT    60 tablet    Take 1-2 tablets (4-8 mg) by mouth every 6 hours as needed for nausea Take 1-2 tablets by mouth as needed for nausea    Nausea       order for DME     1 each    Nebulizer with mask and tubing    Mild persistent asthma without complication       oxyCODONE 5 MG IR tablet   Start taking on:  10/6/2017    ROXICODONE    56 tablet    Take 1 tablet (5 mg) by mouth every 6 hours as needed for pain maximum 4 tablet(s) per day    Chronic pain syndrome, Chronic bilateral low back pain with left-sided sciatica, S/P lumbar fusion, Lumbar disc disease with radiculopathy       pantoprazole 40 MG EC tablet    PROTONIX    90 tablet    Take 1 tablet (40 mg) by mouth daily Take 30-60 minutes before a meal.    Gastroesophageal reflux disease without esophagitis       promethazine 25 MG tablet    PHENERGAN    60 tablet    Take 1 tablet (25 mg) by mouth every 6 hours as needed for nausea    Hx of Crohn's disease       rizatriptan 5 MG ODT tab    MAXALT-MLT    18 tablet    Take 1-2 tablets (5-10 mg) by mouth at onset of headache for migraine May repeat in 2 hours. Max 6 tablets/24 hours.    Migraine with aura and without status migrainosus, not intractable       senna-docusate 8.6-50 MG per tablet    SENOKOT-S;PERICOLACE    60 tablet    Take 1 tablet by mouth 2 times daily as needed for constipation    Drug-induced constipation       sucralfate 1 GM/10ML suspension    CARAFATE    420 mL    Take 10 mLs (1 g) by mouth 4 times daily (before meals and nightly)    Abdominal pain, epigastric, Hematemesis with nausea       * Notice:  This list has 4 medication(s) that are the same as other medications prescribed for you. Read the directions carefully, and ask your doctor or other care provider to review them with you.

## 2017-10-02 ENCOUNTER — MYC MEDICAL ADVICE (OUTPATIENT)
Dept: FAMILY MEDICINE | Facility: OTHER | Age: 27
End: 2017-10-02

## 2017-10-03 ENCOUNTER — MYC MEDICAL ADVICE (OUTPATIENT)
Dept: FAMILY MEDICINE | Facility: OTHER | Age: 27
End: 2017-10-03

## 2017-10-03 NOTE — TELEPHONE ENCOUNTER
Contacted pharmacy pt has refills on both, and are filling them for pt.  [employer] sent to pt that this was done.  Maya Cardenas CMA

## 2017-10-03 NOTE — TELEPHONE ENCOUNTER
Patient came into the clinic and [place] nurse talked to her that we are still waiting for Dr. Keller notes and then will let her know once we receive them regarding to oxycodone.   She was asking for zofran. Pharmacy had refills so she was going to  the zofran today.
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
## 2017-10-05 ENCOUNTER — HOSPITAL ENCOUNTER (EMERGENCY)
Facility: CLINIC | Age: 27
Discharge: HOME | End: 2017-10-05
Attending: FAMILY MEDICINE | Admitting: FAMILY MEDICINE
Payer: MEDICARE

## 2017-10-05 ENCOUNTER — TELEPHONE (OUTPATIENT)
Dept: FAMILY MEDICINE | Facility: OTHER | Age: 27
End: 2017-10-05

## 2017-10-05 VITALS
OXYGEN SATURATION: 100 % | HEIGHT: 62 IN | HEART RATE: 80 BPM | DIASTOLIC BLOOD PRESSURE: 71 MMHG | WEIGHT: 154.25 LBS | TEMPERATURE: 98.2 F | RESPIRATION RATE: 18 BRPM | BODY MASS INDEX: 28.39 KG/M2 | SYSTOLIC BLOOD PRESSURE: 108 MMHG

## 2017-10-05 DIAGNOSIS — R19.7 DIARRHEA, UNSPECIFIED TYPE: ICD-10-CM

## 2017-10-05 DIAGNOSIS — K52.9 COLITIS: ICD-10-CM

## 2017-10-05 LAB
ALBUMIN UR-MCNC: NEGATIVE MG/DL
ANION GAP SERPL CALCULATED.3IONS-SCNC: 8 MMOL/L (ref 3–14)
APPEARANCE UR: CLEAR
BACTERIA #/AREA URNS HPF: ABNORMAL /HPF
BASOPHILS # BLD AUTO: 0 10E9/L (ref 0–0.2)
BASOPHILS NFR BLD AUTO: 0.4 %
BILIRUB UR QL STRIP: NEGATIVE
BUN SERPL-MCNC: 9 MG/DL (ref 7–30)
C DIFF TOX B STL QL: NEGATIVE
CALCIUM SERPL-MCNC: 8.8 MG/DL (ref 8.5–10.1)
CHLORIDE SERPL-SCNC: 110 MMOL/L (ref 94–109)
CO2 SERPL-SCNC: 21 MMOL/L (ref 20–32)
COLOR UR AUTO: YELLOW
CREAT SERPL-MCNC: 0.68 MG/DL (ref 0.52–1.04)
DIFFERENTIAL METHOD BLD: ABNORMAL
EOSINOPHIL # BLD AUTO: 0 10E9/L (ref 0–0.7)
EOSINOPHIL NFR BLD AUTO: 0.6 %
ERYTHROCYTE [DISTWIDTH] IN BLOOD BY AUTOMATED COUNT: 12.5 % (ref 10–15)
GFR SERPL CREATININE-BSD FRML MDRD: >90 ML/MIN/1.7M2
GLUCOSE SERPL-MCNC: 77 MG/DL (ref 70–99)
GLUCOSE UR STRIP-MCNC: NEGATIVE MG/DL
HCT VFR BLD AUTO: 39.6 % (ref 35–47)
HGB BLD-MCNC: 13.5 G/DL (ref 11.7–15.7)
HGB UR QL STRIP: NEGATIVE
IMM GRANULOCYTES # BLD: 0 10E9/L (ref 0–0.4)
IMM GRANULOCYTES NFR BLD: 0 %
KETONES UR STRIP-MCNC: NEGATIVE MG/DL
LEUKOCYTE ESTERASE UR QL STRIP: NEGATIVE
LYMPHOCYTES # BLD AUTO: 1.2 10E9/L (ref 0.8–5.3)
LYMPHOCYTES NFR BLD AUTO: 26.2 %
MCH RBC QN AUTO: 33.1 PG (ref 26.5–33)
MCHC RBC AUTO-ENTMCNC: 34.1 G/DL (ref 31.5–36.5)
MCV RBC AUTO: 97 FL (ref 78–100)
MONOCYTES # BLD AUTO: 0.4 10E9/L (ref 0–1.3)
MONOCYTES NFR BLD AUTO: 8.6 %
MUCOUS THREADS #/AREA URNS LPF: PRESENT /LPF
NEUTROPHILS # BLD AUTO: 3 10E9/L (ref 1.6–8.3)
NEUTROPHILS NFR BLD AUTO: 64.2 %
NITRATE UR QL: NEGATIVE
PH UR STRIP: 5 PH (ref 5–7)
PLATELET # BLD AUTO: 243 10E9/L (ref 150–450)
POTASSIUM SERPL-SCNC: 3.9 MMOL/L (ref 3.4–5.3)
RBC # BLD AUTO: 4.08 10E12/L (ref 3.8–5.2)
RBC #/AREA URNS AUTO: 0 /HPF (ref 0–2)
SODIUM SERPL-SCNC: 139 MMOL/L (ref 133–144)
SOURCE: ABNORMAL
SP GR UR STRIP: 1.02 (ref 1–1.03)
SPECIMEN SOURCE: NORMAL
SQUAMOUS #/AREA URNS AUTO: <1 /HPF (ref 0–1)
UROBILINOGEN UR STRIP-MCNC: 0 MG/DL (ref 0–2)
WBC # BLD AUTO: 4.7 10E9/L (ref 4–11)
WBC #/AREA URNS AUTO: <1 /HPF (ref 0–2)

## 2017-10-05 PROCEDURE — A9270 NON-COVERED ITEM OR SERVICE: HCPCS | Mod: GY | Performed by: FAMILY MEDICINE

## 2017-10-05 PROCEDURE — 96375 TX/PRO/DX INJ NEW DRUG ADDON: CPT | Performed by: FAMILY MEDICINE

## 2017-10-05 PROCEDURE — 85025 COMPLETE CBC W/AUTO DIFF WBC: CPT | Performed by: FAMILY MEDICINE

## 2017-10-05 PROCEDURE — 25000132 ZZH RX MED GY IP 250 OP 250 PS 637: Mod: GY | Performed by: FAMILY MEDICINE

## 2017-10-05 PROCEDURE — 87506 IADNA-DNA/RNA PROBE TQ 6-11: CPT | Performed by: FAMILY MEDICINE

## 2017-10-05 PROCEDURE — 87493 C DIFF AMPLIFIED PROBE: CPT | Performed by: FAMILY MEDICINE

## 2017-10-05 PROCEDURE — 81001 URINALYSIS AUTO W/SCOPE: CPT | Performed by: FAMILY MEDICINE

## 2017-10-05 PROCEDURE — 96361 HYDRATE IV INFUSION ADD-ON: CPT | Performed by: FAMILY MEDICINE

## 2017-10-05 PROCEDURE — 25000128 H RX IP 250 OP 636: Performed by: FAMILY MEDICINE

## 2017-10-05 PROCEDURE — 36415 COLL VENOUS BLD VENIPUNCTURE: CPT | Performed by: FAMILY MEDICINE

## 2017-10-05 PROCEDURE — 99285 EMERGENCY DEPT VISIT HI MDM: CPT | Mod: Z6 | Performed by: FAMILY MEDICINE

## 2017-10-05 PROCEDURE — 80048 BASIC METABOLIC PNL TOTAL CA: CPT | Performed by: FAMILY MEDICINE

## 2017-10-05 PROCEDURE — 99285 EMERGENCY DEPT VISIT HI MDM: CPT | Mod: 25 | Performed by: FAMILY MEDICINE

## 2017-10-05 PROCEDURE — 96376 TX/PRO/DX INJ SAME DRUG ADON: CPT | Performed by: FAMILY MEDICINE

## 2017-10-05 PROCEDURE — 96374 THER/PROPH/DIAG INJ IV PUSH: CPT | Performed by: FAMILY MEDICINE

## 2017-10-05 RX ORDER — ONDANSETRON 2 MG/ML
4 INJECTION INTRAMUSCULAR; INTRAVENOUS
Status: COMPLETED | OUTPATIENT
Start: 2017-10-05 | End: 2017-10-05

## 2017-10-05 RX ORDER — DIPHENOXYLATE HCL/ATROPINE 2.5-.025MG
1 TABLET ORAL 4 TIMES DAILY PRN
Qty: 10 TABLET | Refills: 0 | Status: SHIPPED | OUTPATIENT
Start: 2017-10-05 | End: 2017-12-21

## 2017-10-05 RX ORDER — HYDROMORPHONE HYDROCHLORIDE 1 MG/ML
0.5 INJECTION, SOLUTION INTRAMUSCULAR; INTRAVENOUS; SUBCUTANEOUS ONCE
Status: COMPLETED | OUTPATIENT
Start: 2017-10-05 | End: 2017-10-05

## 2017-10-05 RX ORDER — ONDANSETRON 2 MG/ML
4 INJECTION INTRAMUSCULAR; INTRAVENOUS ONCE
Status: COMPLETED | OUTPATIENT
Start: 2017-10-05 | End: 2017-10-05

## 2017-10-05 RX ORDER — METRONIDAZOLE 500 MG/1
500 TABLET ORAL 4 TIMES DAILY
Qty: 40 TABLET | Refills: 0 | Status: SHIPPED | OUTPATIENT
Start: 2017-10-05 | End: 2017-10-15

## 2017-10-05 RX ORDER — DIPHENOXYLATE HCL/ATROPINE 2.5-.025MG
1 TABLET ORAL ONCE
Status: COMPLETED | OUTPATIENT
Start: 2017-10-05 | End: 2017-10-05

## 2017-10-05 RX ADMIN — ONDANSETRON 4 MG: 2 SOLUTION INTRAMUSCULAR; INTRAVENOUS at 14:49

## 2017-10-05 RX ADMIN — SODIUM CHLORIDE 1000 ML: 9 INJECTION, SOLUTION INTRAVENOUS at 13:04

## 2017-10-05 RX ADMIN — HYDROMORPHONE HYDROCHLORIDE 0.5 MG: 1 INJECTION, SOLUTION INTRAMUSCULAR; INTRAVENOUS; SUBCUTANEOUS at 13:05

## 2017-10-05 RX ADMIN — ONDANSETRON 4 MG: 2 SOLUTION INTRAMUSCULAR; INTRAVENOUS at 13:08

## 2017-10-05 RX ADMIN — DIPHENOXYLATE HYDROCHLORIDE AND ATROPINE SULFATE 1 TABLET: 2.5; .025 TABLET ORAL at 11:29

## 2017-10-05 ASSESSMENT — ENCOUNTER SYMPTOMS
NAUSEA: 1
DIZZINESS: 1
FEVER: 0
ABDOMINAL PAIN: 1
CHILLS: 0
EYE PAIN: 0
VOMITING: 1
SORE THROAT: 0
EYE REDNESS: 0
COUGH: 0
WEAKNESS: 1
BACK PAIN: 0
BLOOD IN STOOL: 0
CONFUSION: 0
CHEST TIGHTNESS: 0
PALPITATIONS: 0
LIGHT-HEADEDNESS: 1
DYSURIA: 0
FATIGUE: 0
DIARRHEA: 1
SHORTNESS OF BREATH: 0
POLYDIPSIA: 0

## 2017-10-05 NOTE — TELEPHONE ENCOUNTER
I spoke with Mom, per consent of patient.   She has been having watery diarrhea for three days with some moderate amount of bright red blood.   She is going every half hour.   Still urinating.   Abdominal cramping is present rated 6/10.   She has a headache and is mildly dizzy all the time.   Patient tried Bentyl.   She was treated for C-diff approximately 1 month ago.  Mom and patient are requesting an order for stool studies.   Huddled with Kat Trimble NP - recommend evaluation in ED due to symptoms, but if they don't want to go then order can be placed for C-diff test.   Mom and patient were informed, patient wishes to go to ED.     Ines Roy, RN, BSN

## 2017-10-05 NOTE — ED NOTES
She has nausea, vomiting and diarrhea for the past 3 days.  She recently had cephalexin for bronchitis and sulfa for UTI

## 2017-10-05 NOTE — ED NOTES
Pt given water and crackers, she says she is still having sharp pain and cramping. Wants a Bentyl. Will let the provider know.

## 2017-10-05 NOTE — DISCHARGE INSTRUCTIONS
Uncertain Causes of Diarrhea (Adult)    Diarrhea is when stools are loose and watery. This can be caused by:    Viral infections    Bacterial infections    Food poisoning    Parasites    Irritable bowel syndrome (IBS)    Inflammatory bowel diseases such as ulcerative colitis, Crohn's disease, and celiac disease    Food intolerance, such as to lactose, the sugar found in milk and milk products    Reaction to medicines like antibiotics, laxatives, cancer drugs, and antacids  Along with diarrhea, you may also have:    Abdominal pain and cramping    Nausea and vomiting    Loss of bowel control    Fever and chills    Bloody stools  In some cases, antibiotics may help to treat diarrhea. You may have a stool sample test. This is done to see what is causing your diarrhea, and if antibiotics will help treat it. The results of a stool sample test may take up to 2 days. The healthcare provider may not give you antibiotics until he or she has the stool test results.  Diarrhea can cause dehydration. This is the loss of too much water and other fluids from the body. When this occurs, body fluid must be replaced. This can be done with oral rehydration solutions. Oral rehydration solutions are available at drugstores and grocery stores without a prescription.  Home care  Follow all instructions given by your healthcare provider. Rest at home for the next 24 hours, or until you feel better. Avoid caffeine, tobacco, and alcohol. These can make diarrhea, cramping, and pain worse.  If taking medicines:    Don t take over-the-counter diarrhea or nausea medicines unless your healthcare provider tells you to.    You may use acetaminophen or NSAID medicines like ibuprofen or naproxen to reduce pain and fever. Don t use these if you have chronic liver or kidney disease, or ever had a stomach ulcer or gastrointestinal bleeding. Don't use NSAID medicines if you are already taking one for another condition (like arthritis) or are on daily  aspirin therapy (such as for heart disease or after a stroke). Talk with your healthcare provider first.    If antibiotics were prescribed, be sure you take them until they are finished. Don t stop taking them even when you feel better. Antibiotics must be taken as a full course.  To prevent the spread of illness:    Remember that washing with soap and water and using alcohol-based  is the best way to prevent the spread of infection.    Clean the toilet after each use.    Wash your hands before eating.    Wash your hands before and after preparing food. Keep in mind that people with diarrhea or vomiting should not prepare food for others.    Wash your hands after using cutting boards, countertops, and knives that have been in contact with raw foods.    Wash and then peel fruits and vegetables.    Keep uncooked meats away from cooked and ready-to-eat foods.    Use a food thermometer when cooking. Cook poultry to at least 165 F (74 C). Cook ground meat (beef, veal, pork, lamb) to at least 160 F (71 C). Cook fresh beef, veal, lamb, and pork to at least 145 F (63 C).    Don t eat raw or undercooked eggs (poached or glenys side up), poultry, meat, or unpasteurized milk and juices.  Food and drinks  The main goal while treating vomiting or diarrhea is to prevent dehydration. This is done by taking small amounts of liquids often.    Keep in mind that liquids are more important than food right now.    Drink only small amounts of liquids at a time.    Don t force yourself to eat, especially if you are having cramping, vomiting, or diarrhea. Don t eat large amounts at a time, even if you are hungry.    If you eat, avoid fatty, greasy, spicy, or fried foods.    Don t eat dairy foods or drink milk if you have diarrhea. These can make diarrhea worse.  During the first 24 hours you can try:    Oral rehydration solutions. Do not use sports drinks. They have too much sugar and not enough electrolytes.    Soft drinks without  caffeine    Ginger ale    Water (plain or flavored)    Decaf tea or coffee    Clear broth, consommé, or bouillon    Gelatin, popsicles, or frozen fruit juice bars  The second 24 hours, if you are feeling better, you can add:    Hot cereal, plain toast, bread, rolls, or crackers    Plain noodles, rice, mashed potatoes, chicken noodle soup, or rice soup    Unsweetened canned fruit (no pineapple)    Bananas  As you recover:    Limit fat intake to less than 15 grams per day. Don t eat margarine, butter, oils, mayonnaise, sauces, gravies, fried foods, peanut butter, meat, poultry, or fish.    Limit fiber. Don t eat raw or cooked vegetables, fresh fruits except bananas, or bran cereals.    Limit caffeine and chocolate.    Limit dairy.    Don t use spices or seasonings except salt.    Go back to your normal diet over time, as you feel better and your symptoms improve.    If the symptoms come back, go back to a simple diet or clear liquids.  Follow-up care  Follow up with your healthcare provider, or as advised. If a stool sample was taken or cultures were done, call the healthcare provider for the results as instructed.  Call 911  Call 911 if you have any of these symptoms:    Trouble breathing    Confusion    Extreme drowsiness or trouble walking    Loss of consciousness    Rapid heart rate    Chest pain    Stiff neck    Seizure  When to seek medical advice  Call your healthcare provider right away if any of these occur:    Abdominal pain that gets worse    Constant lower right abdominal pain    Continued vomiting and inability to keep liquids down    Diarrhea more than 5 times a day    Blood in vomit or stool    Dark urine or no urine for 8 hours, dry mouth and tongue, tiredness, weakness, or dizziness    Drowsiness    New rash    You don t get better in 2 to 3 days    Fever of 100.4 F (38 C) or higher that doesn t get lower with medicine  Date Last Reviewed: 1/3/2016    1067-0313 [employer]. [address] [place] [address]  [place] [address]. All rights reserved. This information is not intended as a substitute for professional medical care. Always follow your healthcare professional's instructions.

## 2017-10-05 NOTE — ED AVS SNAPSHOT
[facility] Emergency Department    [address] [place] [address]    Phone:  [phone]    Fax:  [phone]                                       Katy Islas   MRN: [id number]    Department:  [facility] Emergency Department   Date of Visit:  10/5/2017           After Visit Summary Signature Page     I have received my discharge instructions, and my questions have been answered. I have discussed any challenges I see with this plan with the nurse or doctor.    ..........................................................................................................................................  Patient/Patient Representative Signature      ..........................................................................................................................................  Patient Representative Print Name and Relationship to Patient    ..................................................               ................................................  Date                                            Time    ..........................................................................................................................................  Reviewed by Signature/Title    ...................................................              ..............................................  Date                                                            Time

## 2017-10-05 NOTE — ED PROVIDER NOTES
History     Chief Complaint   Patient presents with     Nausea, Vomiting, & Diarrhea     The history is provided by the patient and a parent. The history is limited by a language barrier. No  was used.      The patient and mother preferred to use the mother as a . The patient is also somewhat able to read lips and talk herself.    Katy Islas is a 27 year old female with a history of hearing loss, Crohn's and C. difficile colitis among other medical problems who now presents with 3 days of copious watery diarrhea, nausea and vomiting. Patient just finished an antibiotic--cephalexin--for a UTI. Patient tested positive on 8/11/2016 for C. difficile and then tested negative on 8/26/2016 after treatment. Both the patient and her mother state that she had it 2 months ago and was successfully treated but needed vancomycin. They both also recall another episode. The patient is having abdominal pain which is described as a lot of cramping and then watery rectal discharge. The last few episodes it is just been yellow mucus. No blood. She denies fever. She is nauseated. She has adjusted her diet to clear liquids and crackers.    Patient Active Problem List   Diagnosis     Hearing loss     Allergic rhinitis     Migraine     Benign neoplasm of skin of lower limb, including hip     Dysmenorrhea     Crohn's colitis (H)     Fibromyalgia     Mild major depression (H)     Anxiety     Chronic pain     Oral thrush     Nausea and vomiting     Abdominal pain     Grand mal seizure disorder (H)     Health Care Home     Bipolar disorder (H)     Marijuana abuse     Adjustment disorder with mixed anxiety and depressed mood     Herpes simplex virus infection     Gastroesophageal reflux disease without esophagitis     Bee sting allergy     Mild persistent asthma without complication     Chronic bilateral low back pain with left-sided sciatica     Vitamin D deficiency     Atypical mole      Lumbar disc disease with radiculopathy     S/P lumbar fusion     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)     Upper GI bleed - suspected     Anemia due to blood loss, acute     Tobacco abuse     History of pseudoseizure     Past Medical History:   Diagnosis Date     Abdominal pain 10/31- 11/4/2005    [facility] admit for Crohn's     Allergic rhinitis, cause unspecified     Allergic rhinitis     Arthritis      C. difficile diarrhea     Past, no current diarrhea.     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, without mention of intractable epilepsy     pseudoseizures diagnosed after extensive neurologic eval     Migraine 07/21/12    D/C 07/22/12-[facility]     Migraine, unspecified, without mention of intractable migraine without mention of status migrainosus     Migraine     Mild intermittent asthma     mild intermittent     Mycoplasma infection in conditions classified elsewhere and of unspecified site      Other chronic pain     Back pain for 6 years     Renal disease     Kidney stones     Unspecified hearing loss     congenital hearing loss     No current facility-administered medications for this encounter.      Current Outpatient Prescriptions   Medication     diphenoxylate-atropine (LOMOTIL) 2.5-0.025 MG per tablet     metroNIDAZOLE (FLAGYL) 500 MG tablet     cetirizine (ZYRTEC) 10 MG tablet     [START ON 10/6/2017] oxyCODONE (ROXICODONE) 5 MG IR tablet     diazepam (VALIUM) 5 MG tablet     guaiFENesin-codeine (ROBITUSSIN AC) 100-10 MG/5ML SOLN solution     gabapentin (NEURONTIN) 300 MG capsule      cyclobenzaprine (FLEXERIL) 10 MG tablet     DULoxetine (CYMBALTA) 60 MG EC capsule     dexlansoprazole (DEXILANT) 60 MG CPDR CR capsule     albuterol (PROAIR HFA/PROVENTIL HFA/VENTOLIN HFA) 108 (90 BASE) MCG/ACT Inhaler     dicyclomine (BENTYL) 20 MG tablet     senna-docusate (SENOKOT-S;PERICOLACE) 8.6-50 MG per tablet     albuterol (2.5 MG/3ML) 0.083% neb solution     order for DME     ARIPiprazole (ABILIFY) 5 MG tablet     meclizine (ANTIVERT) 12.5 MG tablet     ondansetron (ZOFRAN ODT) 4 MG ODT tab     rizatriptan (MAXALT-MLT) 5 MG ODT tab     medroxyPROGESTERone (DEPO-PROVERA) 150 MG/ML injection     sucralfate (CARAFATE) 1 GM/10ML suspension     cetirizine (ZYRTEC) 10 MG tablet     promethazine (PHENERGAN) 25 MG tablet     pantoprazole (PROTONIX) 40 MG EC tablet     lidocaine (LIDODERM) 5 % Patch     fluticasone (FLONASE) 50 MCG/ACT spray     medical cannabis inhalation (Patient's own supply.  Not a prescription)     EPINEPHrine (EPIPEN) 0.3 MG/0.3ML injection     Misc Natural Products (FLEX-A-MIN JOINT FLEX PO)     Multiple Vitamin (MULTI-VITAMIN) per tablet     Allergies   Allergen Reactions     Ativan [Lorazepam] Hives     At the IV site     Baclofen      hives     Bees Hives, Swelling and Difficulty breathing     Caffeine      Contrast Dye      Hives,   Updated 5/10/2016 CT Contrast.     Diazepam      IV form makes her become aggressive and angry     Iodine Hives     Methocarbamol Swelling     Metoclopramide      Other reaction(s): Tremors  LW Reaction: shaking/sweating     Midazolam      Other reaction(s): Agitation     Monosodium Glutamate      Morphine Other (See Comments)     Difficulty with urination     Nsaids Other (See Comments)     GI BLEED x2     Other (Do Not Use) Other (See Comments)     Xanaflex- pt becomes disoriented and loses bladder control     Percocet [Oxycodone-Acetaminophen] Itching     Reglan [Metoclopramide Hcl] Other (See Comments)     shaking     Soma [Carisoprodol] Visual  Disturbance     Sleep walking     Tizanidine      Topamax Other (See Comments)     Topamax [Topiramate] Nausea     Tingling  GI/Vomit     Tramadol      Severe Headache, Seizure Risk     Tylenol W/Codeine [Acetaminophen-Codeine] Nausea and Itching     Tylenol 3     Valium Other (See Comments)     Becomes aggressive and angry     Versed Other (See Comments)     Zolmitriptan      Makes face feel like its twitching     Droperidol Anxiety     Flu Virus Vaccine Rash      Arm swelling     Past Surgical History:   Procedure Laterality Date     COLONOSCOPY  7/1/2009    [facility].     FUSION SPINE POSTERIOR MINIMALLY INVASIVE ONE LEVEL N/A 2/23/2017    Procedure: FUSION SPINE POSTERIOR MINIMALLY INVASIVE ONE LEVEL;  L4-5 Oblique Lateral Lumbar Interbody Fusion   Epidural steroid injection.   Transpedicular Bone marrow aspiration;  Surgeon: Jeniffer Eugene MD;  Location: [facility] OR     HC COLONOSCOPY THRU STOMA WITH BIOPSY  10/29/2003    Impression is that of normal appearing colonoscopy, without evidence of rectal bleeding.     HC COLONOSCOPY THRU STOMA, DIAGNOSTIC  10/00    normal     HC COLONOSCOPY THRU STOMA, DIAGNOSTIC  Oct 2009    Dr López- normal     HC EEG AWAKE AND SLEEP      abnormal     HC MRI BRAIN W/O CONTRAST  12/00    normal     HC REMOVAL GALLBLADDER  8/5/2009    [facility], [facility].     HC UGI ENDOSCOPY DIAG W BIOPSY  11/11/09    Normal esophagus     HC UGI ENDOSCOPY, SIMPLE EXAM  7/00, 10/00    mild chronic esophagitis and duodenitis, neg H pylori     HC UGI ENDOSCOPY, SIMPLE EXAM  01/20/2005    Esophagogastroduodenoscopy, colonoscopy with biopsies.  [facility] UGI ENDOSCOPY, SIMPLE EXAM  7/1/2009    [facility], [facility].      UGI ENDOSCOPY, SIMPLE EXAM  11/11/2009    attempted upper GI, pt. could not tolerate procedure:[facility]     ORTHOPEDIC SURGERY  October 19,2011    diskectomy L4-L5     Social History     Social History      Marital status: Single     Spouse name: N/A     Number of children: N/A     Years of education: N/A     Occupational History     Not on file.     Social History Main Topics     Smoking status: Current Every Day Smoker     Packs/day: 0.25     Types: Cigarettes     Smokeless tobacco: Never Used     Alcohol use 0.6 oz/week     1 Cans of beer per week      Comment: 1-2 drinks montlhly     Drug use: Yes      Comment: Medical Marijuana currently     Sexual activity: Yes     Partners: Male     Birth control/ protection: , Pill, OCP     Other Topics Concern      Service No     Blood Transfusions No     Caffeine Concern No     Occupational Exposure No     Hobby Hazards No     Sleep Concern No     Stress Concern No     Weight Concern No     Special Diet No     Back Care No     Exercise Yes     Bike Helmet No     Seat Belt Yes     Self-Exams Yes     Parent/Sibling W/ Cabg, Mi Or Angioplasty Before 65f 55m? No     Social History Narrative         I have reviewed the Medications, Allergies, Past Medical and Surgical History, and Social History in the Epic system.         Review of Systems   Constitutional: Negative for chills, fatigue and fever.   HENT: Negative for congestion and sore throat.    Eyes: Negative for pain and redness.   Respiratory: Negative for cough, chest tightness and shortness of breath.    Cardiovascular: Negative for chest pain and palpitations.   Gastrointestinal: Positive for abdominal pain, diarrhea, nausea and vomiting. Negative for blood in stool.   Endocrine: Negative for polydipsia and polyuria.   Genitourinary: Negative for dysuria.   Musculoskeletal: Negative for back pain.   Skin: Negative for rash.   Allergic/Immunologic: Negative for immunocompromised state.   Neurological: Positive for dizziness, weakness and light-headedness.   Psychiatric/Behavioral: Negative for confusion.       Physical Exam   BP: 119/87  Pulse: 97  Temp: 98.2  F (36.8  C)  Resp: 18  Height: 157.5 cm (5'  "2\")  Weight: 70 kg (154 lb 4 oz)  SpO2: 100 %  Physical Exam   Constitutional: She is oriented to person, place, and time.   Alert pleasant 27-year-old female in no acute distress./87  Pulse 97  Temp 98.2  F (36.8  C) (Oral)  Resp 18  Ht 1.575 m (5' 2\")  Wt 70 kg (154 lb 4 oz)  SpO2 100%  BMI 28.21 kg/m2     HENT:   Head: Normocephalic.   Right Ear: External ear normal.   Left Ear: External ear normal.   Nose: Nose normal.   Mouth/Throat: Oropharynx is clear and moist.   Eyes: Conjunctivae are normal.   Neck: Normal range of motion. Neck supple.   Cardiovascular: Normal rate, regular rhythm and normal heart sounds.    Pulmonary/Chest: Effort normal and breath sounds normal.   Abdominal: Soft.   She has discomfort to palpation in all 4 quadrants. No severe or localized tenderness. No peritoneal signs of rebound or guarding. Bowel sounds are very active.   Musculoskeletal: Normal range of motion.   Neurological: She is alert and oriented to person, place, and time.   Skin: Skin is warm and dry.   Psychiatric: She has a normal mood and affect. Her behavior is normal.   Nursing note and vitals reviewed.      ED Course     ED Course     Results for orders placed or performed during the hospital encounter of 10/05/17 (from the past 48 hour(s))   UA with Microscopic reflex to Culture   Result Value Ref Range    Color Urine Yellow     Appearance Urine Clear     Glucose Urine Negative NEG^Negative mg/dL    Bilirubin Urine Negative NEG^Negative    Ketones Urine Negative NEG^Negative mg/dL    Specific Gravity Urine 1.019 1.003 - 1.035    Blood Urine Negative NEG^Negative    pH Urine 5.0 5.0 - 7.0 pH    Protein Albumin Urine Negative NEG^Negative mg/dL    Urobilinogen mg/dL 0.0 0.0 - 2.0 mg/dL    Nitrite Urine Negative NEG^Negative    Leukocyte Esterase Urine Negative NEG^Negative    Source Midstream Urine     WBC Urine <1 0 - 2 /HPF    RBC Urine 0 0 - 2 /HPF    Bacteria Urine Few (A) NEG^Negative /HPF    Squamous " Epithelial /HPF Urine <1 0 - 1 /HPF    Mucous Urine Present (A) NEG^Negative /LPF   Basic metabolic panel   Result Value Ref Range    Sodium 139 133 - 144 mmol/L    Potassium 3.9 3.4 - 5.3 mmol/L    Chloride 110 (H) 94 - 109 mmol/L    Carbon Dioxide 21 20 - 32 mmol/L    Anion Gap 8 3 - 14 mmol/L    Glucose 77 70 - 99 mg/dL    Urea Nitrogen 9 7 - 30 mg/dL    Creatinine 0.68 0.52 - 1.04 mg/dL    GFR Estimate >90 >60 mL/min/1.7m2    GFR Estimate If Black >90 >60 mL/min/1.7m2    Calcium 8.8 8.5 - 10.1 mg/dL   CBC with platelets differential   Result Value Ref Range    WBC 4.7 4.0 - 11.0 10e9/L    RBC Count 4.08 3.8 - 5.2 10e12/L    Hemoglobin 13.5 11.7 - 15.7 g/dL    Hematocrit 39.6 35.0 - 47.0 %    MCV 97 78 - 100 fl    MCH 33.1 (H) 26.5 - 33.0 pg    MCHC 34.1 31.5 - 36.5 g/dL    RDW 12.5 10.0 - 15.0 %    Platelet Count 243 150 - 450 10e9/L    Diff Method Automated Method     % Neutrophils 64.2 %    % Lymphocytes 26.2 %    % Monocytes 8.6 %    % Eosinophils 0.6 %    % Basophils 0.4 %    % Immature Granulocytes 0.0 %    Absolute Neutrophil 3.0 1.6 - 8.3 10e9/L    Absolute Lymphocytes 1.2 0.8 - 5.3 10e9/L    Absolute Monocytes 0.4 0.0 - 1.3 10e9/L    Absolute Eosinophils 0.0 0.0 - 0.7 10e9/L    Absolute Basophils 0.0 0.0 - 0.2 10e9/L    Abs Immature Granulocytes 0.0 0 - 0.4 10e9/L     *Note: Due to a large number of results and/or encounters for the requested time period, some results have not been displayed. A complete set of results can be found in Results Review.       Procedures             Critical Care time:  none                   Assessments & Plan (with Medical Decision Making)   MDM---27-year-old female who presents with copious watery diarrhea and history of recent antibiotic use. She has a history of Crohn's disease as a child and recent history of C. difficile colitis. She has had this treated twice successfully once with Flagyl once with vancomycin. I highly suspect that this is C. difficile colitis. She  has not had Crohn's for many years. She has had recent antibiotic use. A C. difficile toxin has been sent out. I will start Flagyl 500 mg q.i.d. 10 days. I also gave her a few Lomotil to settle her cut down. She should push the liquids and stay well-hydrated. If she develops fever or pain or blood she will return to the emergency room otherwise I recommend following up with her primary care doctor in 1-2 weeks. All this was explained to the patient and the patient's mother and they are comfortable with this evaluation treatment discharge and follow-up plan. All their questions were answered to their satisfaction and she was discharged in improved/stable condition.  I have reviewed the nursing notes.    I have reviewed the findings, diagnosis, plan and need for follow up with the patient.       Discharge Medication List as of 10/5/2017  2:38 PM          Final diagnoses:   Colitis   Diarrhea, unspecified type       10/5/2017   [facility] EMERGENCY DEPARTMENT     Prosper, Shiv COWAN MD  10/05/17 [id number]

## 2017-10-05 NOTE — ED AVS SNAPSHOT
[facility] Emergency Department    [address] [place] [address]    Phone:  [phone]    Fax:  [phone]                                       Katy Islas   MRN: [id number]    Department:  [facility] Emergency Department   Date of Visit:  10/5/2017           Patient Information     Date Of Birth          1990        Your diagnoses for this visit were:     Colitis     Diarrhea, unspecified type        You were seen by Prosper, Shiv COWAN MD.      Follow-up Information     Follow up with Aura Rosario PA-C.    Specialty:  Physician Assistant - Medical    Why:  As needed    Contact information:    [address]  [facility] [address]  [phone]          Follow up with [facility] Emergency Department.    Specialty:  EMERGENCY MEDICINE    Why:  If symptoms worsen    Contact information:    [address] [place] [id number] [phone]    Additional information:    From [address]: Exit at [address] on south side of [place]. Turn right on [address]. Turn left at stoplight on [address]. [facility] will be in view two blocks ahead        Discharge Instructions         Uncertain Causes of Diarrhea (Adult)    Diarrhea is when stools are loose and watery. This can be caused by:    Viral infections    Bacterial infections    Food poisoning    Parasites    Irritable bowel syndrome (IBS)    Inflammatory bowel diseases such as ulcerative colitis, Crohn's disease, and celiac disease    Food intolerance, such as to lactose, the sugar found in milk and milk products    Reaction to medicines like antibiotics, laxatives, cancer drugs, and antacids  Along with diarrhea, you may also have:    Abdominal pain and cramping    Nausea and vomiting    Loss of bowel control    Fever and chills    Bloody stools  In some cases, antibiotics may help to treat diarrhea. You may have a stool sample test. This is done to see what is  causing your diarrhea, and if antibiotics will help treat it. The results of a stool sample test may take up to 2 days. The healthcare provider may not give you antibiotics until he or she has the stool test results.  Diarrhea can cause dehydration. This is the loss of too much water and other fluids from the body. When this occurs, body fluid must be replaced. This can be done with oral rehydration solutions. Oral rehydration solutions are available at drugstores and grocery stores without a prescription.  Home care  Follow all instructions given by your healthcare provider. Rest at home for the next 24 hours, or until you feel better. Avoid caffeine, tobacco, and alcohol. These can make diarrhea, cramping, and pain worse.  If taking medicines:    Don t take over-the-counter diarrhea or nausea medicines unless your healthcare provider tells you to.    You may use acetaminophen or NSAID medicines like ibuprofen or naproxen to reduce pain and fever. Don t use these if you have chronic liver or kidney disease, or ever had a stomach ulcer or gastrointestinal bleeding. Don't use NSAID medicines if you are already taking one for another condition (like arthritis) or are on daily aspirin therapy (such as for heart disease or after a stroke). Talk with your healthcare provider first.    If antibiotics were prescribed, be sure you take them until they are finished. Don t stop taking them even when you feel better. Antibiotics must be taken as a full course.  To prevent the spread of illness:    Remember that washing with soap and water and using alcohol-based  is the best way to prevent the spread of infection.    Clean the toilet after each use.    Wash your hands before eating.    Wash your hands before and after preparing food. Keep in mind that people with diarrhea or vomiting should not prepare food for others.    Wash your hands after using cutting boards, countertops, and knives that have been in contact  with raw foods.    Wash and then peel fruits and vegetables.    Keep uncooked meats away from cooked and ready-to-eat foods.    Use a food thermometer when cooking. Cook poultry to at least 165 F (74 C). Cook ground meat (beef, veal, pork, lamb) to at least 160 F (71 C). Cook fresh beef, veal, lamb, and pork to at least 145 F (63 C).    Don t eat raw or undercooked eggs (poached or glenys side up), poultry, meat, or unpasteurized milk and juices.  Food and drinks  The main goal while treating vomiting or diarrhea is to prevent dehydration. This is done by taking small amounts of liquids often.    Keep in mind that liquids are more important than food right now.    Drink only small amounts of liquids at a time.    Don t force yourself to eat, especially if you are having cramping, vomiting, or diarrhea. Don t eat large amounts at a time, even if you are hungry.    If you eat, avoid fatty, greasy, spicy, or fried foods.    Don t eat dairy foods or drink milk if you have diarrhea. These can make diarrhea worse.  During the first 24 hours you can try:    Oral rehydration solutions. Do not use sports drinks. They have too much sugar and not enough electrolytes.    Soft drinks without caffeine    Ginger ale    Water (plain or flavored)    Decaf tea or coffee    Clear broth, consommé, or bouillon    Gelatin, popsicles, or frozen fruit juice bars  The second 24 hours, if you are feeling better, you can add:    Hot cereal, plain toast, bread, rolls, or crackers    Plain noodles, rice, mashed potatoes, chicken noodle soup, or rice soup    Unsweetened canned fruit (no pineapple)    Bananas  As you recover:    Limit fat intake to less than 15 grams per day. Don t eat margarine, butter, oils, mayonnaise, sauces, gravies, fried foods, peanut butter, meat, poultry, or fish.    Limit fiber. Don t eat raw or cooked vegetables, fresh fruits except bananas, or bran cereals.    Limit caffeine and chocolate.    Limit dairy.    Don t use  spices or seasonings except salt.    Go back to your normal diet over time, as you feel better and your symptoms improve.    If the symptoms come back, go back to a simple diet or clear liquids.  Follow-up care  Follow up with your healthcare provider, or as advised. If a stool sample was taken or cultures were done, call the healthcare provider for the results as instructed.  Call 911  Call 911 if you have any of these symptoms:    Trouble breathing    Confusion    Extreme drowsiness or trouble walking    Loss of consciousness    Rapid heart rate    Chest pain    Stiff neck    Seizure  When to seek medical advice  Call your healthcare provider right away if any of these occur:    Abdominal pain that gets worse    Constant lower right abdominal pain    Continued vomiting and inability to keep liquids down    Diarrhea more than 5 times a day    Blood in vomit or stool    Dark urine or no urine for 8 hours, dry mouth and tongue, tiredness, weakness, or dizziness    Drowsiness    New rash    You don t get better in 2 to 3 days    Fever of 100.4 F (38 C) or higher that doesn t get lower with medicine  Date Last Reviewed: 1/3/2016    [id number] [employer]. [address] [place] [address]. All rights reserved. This information is not intended as a substitute for professional medical care. Always follow your healthcare professional's instructions.          Discharge References/Attachments     DIARRHEA, UNKNOWN CAUSE (ENGLISH)    INFECTION, CLOSTRIDIUM DIFFICILE (ENGLISH)      Future Appointments        Provider Department Dept Phone Center    10/12/2017 1:15 PM Dayana Roldan; Aura Rosario PA-C [facility] [phone] [facility]    10/17/2017 9:00 AM Shira Faustin; Aura Rosario PA-C [facility] [phone] [facility]      24 Hour Appointment Hotline       To make an appointment at any [facility], call [phone]  ([phone]). If you don't have a family doctor or clinic, we will help you find one. [facility] clinics are conveniently located to serve the needs of you and your family.             Review of your medicines      Our records show that you are taking the medicines listed below. If these are incorrect, please call your family doctor or clinic.        Dose / Directions Last dose taken    * albuterol 108 (90 BASE) MCG/ACT Inhaler   Commonly known as:  PROAIR HFA/PROVENTIL HFA/VENTOLIN HFA   Dose:  2 puff   Quantity:  1 Inhaler        Inhale 2 puffs into the lungs every 6 hours as needed for shortness of breath / dyspnea or wheezing   Refills:  1        * albuterol (2.5 MG/3ML) 0.083% neb solution   Dose:  1 vial   Quantity:  50 vial        Take 1 vial (2.5 mg) by nebulization every 6 hours as needed for shortness of breath / dyspnea or wheezing   Refills:  1        ARIPiprazole 5 MG tablet   Commonly known as:  ABILIFY   Dose:  5 mg   Quantity:  30 tablet        Take 1 tablet (5 mg) by mouth At Bedtime   Refills:  0        * cetirizine 10 MG tablet   Commonly known as:  zyrTEC   Dose:  10 mg        Take 10 mg by mouth daily   Refills:  0        * cetirizine 10 MG tablet   Commonly known as:  zyrTEC   Dose:  10 mg   Quantity:  90 tablet        Take 1 tablet (10 mg) by mouth every evening   Refills:  3        cyclobenzaprine 10 MG tablet   Commonly known as:  FLEXERIL   Dose:  20 mg   Quantity:  60 tablet        Take 2 tablets (20 mg) by mouth nightly as needed for muscle spasms or other (back pain)   Refills:  3        dexlansoprazole 60 MG Cpdr CR capsule   Commonly known as:  DEXILANT   Dose:  60 mg   Quantity:  30 capsule        Take 1 capsule (60 mg) by mouth daily   Refills:  3        diazepam 5 MG tablet   Commonly known as:  VALIUM   Dose:  2.5-5 mg   Quantity:  60 tablet        Take 0.5-1 tablets (2.5-5 mg) by mouth every 12 hours as needed for anxiety or sleep (MUSCLE SPASM) (one month supply)   Refills:  3         dicyclomine 20 MG tablet   Commonly known as:  BENTYL   Dose:  20 mg   Quantity:  30 tablet        Take 1 tablet (20 mg) by mouth 4 times daily as needed (ABDOMINAL CRAMPING) AS NEEDED FOR CRAMPING   Refills:  3        DULoxetine 60 MG EC capsule   Commonly known as:  CYMBALTA   Dose:  60 mg   Quantity:  30 capsule        Take 1 capsule (60 mg) by mouth daily   Refills:  3        EPINEPHrine 0.3 MG/0.3ML injection 2-pack   Commonly known as:  EPIPEN/ADRENACLICK/or ANY BX GENERIC EQUIV   Dose:  0.3 mg   Quantity:  0.6 mL        Inject 0.3 mLs (0.3 mg) into the muscle once as needed for anaphylaxis   Refills:  1        FLEX-A-MIN JOINT FLEX PO        Take by mouth daily Reported on 3/28/2017   Refills:  0        fluticasone 50 MCG/ACT spray   Commonly known as:  FLONASE   Dose:  2 spray   Quantity:  1 Bottle        Spray 2 sprays into both nostrils daily   Refills:  3        gabapentin 300 MG capsule   Commonly known as:  NEURONTIN   Dose:  300 mg   Quantity:  90 capsule        Take 1 capsule (300 mg) by mouth 3 times daily   Refills:  3        guaiFENesin-codeine 100-10 MG/5ML Soln solution   Commonly known as:  ROBITUSSIN AC   Dose:  2 tsp.   Quantity:  120 mL        Take 10 mLs by mouth every 6 hours as needed for cough   Refills:  0        lidocaine 5 % Patch   Commonly known as:  LIDODERM   Quantity:  30 patch        Apply up to 3 patches to painful area at once for up to 12 h within a 24 h period.  Remove after 12 hours.   Refills:  3        meclizine 12.5 MG tablet   Commonly known as:  ANTIVERT   Dose:  12.5 mg   Quantity:  30 tablet        Take 1 tablet (12.5 mg) by mouth 4 times daily as needed for dizziness   Refills:  3        medical cannabis inhalation (Patient's own supply.  Not a prescription)        Inhale into the lungs 3 times daily as needed Reported on 3/28/2017   Refills:  0        medroxyPROGESTERone 150 MG/ML injection   Commonly known as:  DEPO-PROVERA   Dose:  150 mg   Quantity:  3 mL         Inject 1 mL (150 mg) into the muscle every 3 months   Refills:  3        Multi-vitamin Tabs tablet   Dose:  1 tablet   Generic drug:  multivitamin, therapeutic with minerals        Take 1 tablet by mouth daily Reported on 3/28/2017   Refills:  0        ondansetron 4 MG ODT tab   Commonly known as:  ZOFRAN ODT   Dose:  4-8 mg   Quantity:  60 tablet        Take 1-2 tablets (4-8 mg) by mouth every 6 hours as needed for nausea Take 1-2 tablets by mouth as needed for nausea   Refills:  3        order for DME   Quantity:  1 each        Nebulizer with mask and tubing   Refills:  0        oxyCODONE 5 MG IR tablet   Commonly known as:  ROXICODONE   Dose:  5 mg   Quantity:  56 tablet   Start taking on:  10/6/2017        Take 1 tablet (5 mg) by mouth every 6 hours as needed for pain maximum 4 tablet(s) per day   Refills:  0        pantoprazole 40 MG EC tablet   Commonly known as:  PROTONIX   Dose:  40 mg   Quantity:  90 tablet        Take 1 tablet (40 mg) by mouth daily Take 30-60 minutes before a meal.   Refills:  3        promethazine 25 MG tablet   Commonly known as:  PHENERGAN   Dose:  25 mg   Quantity:  60 tablet        Take 1 tablet (25 mg) by mouth every 6 hours as needed for nausea   Refills:  3        rizatriptan 5 MG ODT tab   Commonly known as:  MAXALT-MLT   Dose:  5-10 mg   Quantity:  18 tablet        Take 1-2 tablets (5-10 mg) by mouth at onset of headache for migraine May repeat in 2 hours. Max 6 tablets/24 hours.   Refills:  3        senna-docusate 8.6-50 MG per tablet   Commonly known as:  SENOKOT-S;PERICOLACE   Dose:  1 tablet   Quantity:  60 tablet        Take 1 tablet by mouth 2 times daily as needed for constipation   Refills:  3        sucralfate 1 GM/10ML suspension   Commonly known as:  CARAFATE   Dose:  1 g   Quantity:  420 mL        Take 10 mLs (1 g) by mouth 4 times daily (before meals and nightly)   Refills:  3        * Notice:  This list has 4 medication(s) that are the same as other  medications prescribed for you. Read the directions carefully, and ask your doctor or other care provider to review them with you.            Procedures and tests performed during your visit     Basic metabolic panel    CBC with platelets differential    UA with Microscopic reflex to Culture      Orders Needing Specimen Collection     Ordered          10/05/17 1111  Enteric Bacteria and Virus Panel by BENJI Stool - STAT, Prio: STAT, Needs to be Collected     Scheduled Task Status   10/05/17 1111 Collect Enteric Bacteria and Virus Panel by BENJI Stool Open   Order Class:  PCU Collect                10/05/17 1111  Clostridium difficile toxin B PCR - ROUTINE, Prio: Routine, Needs to be Collected     Scheduled Task Status   10/05/17 1112 Collect Clostridium difficile toxin B PCR Open   Order Class:  PCU Collect                  Pending Results     No orders found from 10/3/2017 to 10/6/2017.            Pending Culture Results     No orders found from 10/3/2017 to 10/6/2017.            Pending Results Instructions     If you had any lab results that were not finalized at the time of your Discharge, you can call the ED Lab Result RN at [phone]. You will be contacted by this team for any positive Lab results or changes in treatment. The nurses are available 7 days a week from 10A to 6:30P.  You can leave a message 24 hours per day and they will return your call.        Thank you for choosing [facility]       Thank you for choosing [facility] for your care. Our goal is always to provide you with excellent care. Hearing back from our patients is one way we can continue to improve our services. Please take a few minutes to complete the written survey that you may receive in the mail after you visit with us. Thank you!        [email or web address] Information     [employer] gives you secure access to your electronic health record. If you see a primary care provider, you can also send messages to your care team and make appointments. If you have  questions, please call your primary care clinic.  If you do not have a primary care provider, please call [phone] and they will assist you.        Care EveryWhere ID     This is your Care EveryWhere ID. This could be used by other organizations to access your [facility] medical records  [id number]        Equal Access to Services     VIKY VILLANUEVA : Ruth Chen, mayank kelly, susan encinas. So [facility] [phone].    ATENCIÓN: Si habla español, tiene a kerr disposición servicios gratuitos de asistencia lingüística. Llame al [phone].    We comply with applicable federal civil rights laws and [place] laws. We do not discriminate on the basis of race, color, national origin, age, disability, sex, sexual orientation, or gender identity.            After Visit Summary       This is your record. Keep this with you and show to your community pharmacist(s) and doctor(s) at your next visit.

## 2017-10-05 NOTE — TELEPHONE ENCOUNTER
Reason for call:  Patient reporting a symptom    Symptom or request: watery stool    Duration (how long have symptoms been present): 3  days    Have you been treated for this before? Yes    Additional comments: Patient has been having water stool the past 3 days. Mom is looking at coming in and dropping off a stool sample today    Phone Number patient can be reached at:  Cell number on file:    Telephone Information:   Mobile [phone]       Best Time:  any    Can we leave a detailed message on this number:  YES    Call taken on 10/5/2017 at 9:05 AM by Sharon Veronica

## 2017-10-06 ENCOUNTER — TELEPHONE (OUTPATIENT)
Dept: EMERGENCY MEDICINE | Facility: CLINIC | Age: 27
End: 2017-10-06

## 2017-10-06 LAB
C COLI+JEJUNI+LARI FUSA STL QL NAA+PROBE: NOT DETECTED
EC STX1 GENE STL QL NAA+PROBE: ABNORMAL
EC STX2 GENE STL QL NAA+PROBE: ABNORMAL
ENTERIC PATHOGEN COMMENT: ABNORMAL
NOROV GI+II ORF1-ORF2 JNC STL QL NAA+PR: NOT DETECTED
RVA NSP5 STL QL NAA+PROBE: NOT DETECTED
SALMONELLA SP RPOD STL QL NAA+PROBE: NOT DETECTED
SHIGELLA SP+EIEC IPAH STL QL NAA+PROBE: NOT DETECTED
V CHOL+PARA RFBL+TRKH+TNAA STL QL NAA+PR: NOT DETECTED
Y ENTERO RECN STL QL NAA+PROBE: NOT DETECTED
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
## 2017-10-06 NOTE — TELEPHONE ENCOUNTER
[facility] Emergency Department Lab result notification:    [facility] ED lab result protocol used  Enteric Bacteria and Virus Panel by BENJI Stool: Shiga Toxin 1 and Shiga Toxin 2 Protocol    Reason for call  Notify of lab results, assess symptoms,  review ED providers recommendations/discharge instructions (if necessary) and advise per ED lab result f/u protocol    Lab Result  Final Enteric Bacteria and Virus Panel by BENJI Stool is POSITIVE for Shiga Toxin 1 and Shiga Toxin 2  Patient to be notified, symptom's assessed and advised per [facility] ED lab result f/u protocol.  Information table from ED Provider visit on 10/05/2017  Symptoms reported at ED visit (Chief complaint, HPI) is provided by the patient and a parent. The history is limited by a language barrier. No  was used.      The patient and mother preferred to use the mother as a . The patient is also somewhat able to read lips and talk herself.     Katy Islas is a 27 year old female with a history of hearing loss, Crohn's and C. difficile colitis among other medical problems who now presents with 3 days of copious watery diarrhea, nausea and vomiting. Patient just finished an antibiotic--cephalexin--for a UTI. Patient tested positive on 8/11/2016 for C. difficile and then tested negative on 8/26/2016 after treatment. Both the patient and her mother state that she had it 2 months ago and was successfully treated but needed vancomycin. They both also recall another episode. The patient is having abdominal pain which is described as a lot of cramping and then watery rectal discharge. The last few episodes it is just been yellow mucus. No blood. She denies fever. She is nauseated. She has adjusted her diet to clear liquids and crackers.   ED providers Impression and Plan (applicable information) 27-year-old female who presents with copious watery diarrhea and history of recent antibiotic use. She has a  history of Crohn's disease as a child and recent history of C. difficile colitis. She has had this treated twice successfully once with Flagyl once with vancomycin. I highly suspect that this is C. difficile colitis. She has not had Crohn's for many years. She has had recent antibiotic use. A C. difficile toxin has been sent out. I will start Flagyl 500 mg q.i.d. 10 days. I also gave her a few Lomotil to settle her cut down. She should push the liquids and stay well-hydrated. If she develops fever or pain or blood she will return to the emergency room otherwise I recommend following up with her primary care doctor in 1-2 weeks. All this was explained to the patient and the patient's mother and they are comfortable with this evaluation treatment discharge and follow-up plan. All their questions were answered to their satisfaction and she was discharged in improved/stable condition.   Miscellaneous information Follow up with Aura Rosario PA-C. as needed     RN Assessment (Patient s current Symptoms), include time called.  [Insert Left message here if message left]  At 1107 I feel bad still nausea vomiting, goes about 20-30 minutes after eating or drinking.  RN Recommendations/Instructions per [facility] ED lab result protocol    At 1105 Consulted Dr. Barton, [facility] Er provider, who confirmed discontinuing Flagyl as prescribed for what was thought to be C. Diff which is negative at this time.   Discussed expected course of illness, discussed infection control, signs of dehydration, follow up, Return to Er if worsening, new symptoms, or dehydration symtpoms  Please Contact your PCP clinic or return to the Emergency department if your:    Symptoms return.    Symptoms worsen or other concerning symptom's.    PCP follow-up Questions asked: YES       Izabella Dumas, RN  [facility] RN  Lung Nodule and ED Lab Result F/u RN  Epic pool (ED late result f/u RN): [id number]  [facility] INCIDENTAL RADIOLOGY F/U  NURSES: [id number]  # [phone]

## 2017-10-07 ENCOUNTER — HOSPITAL ENCOUNTER (EMERGENCY)
Facility: CLINIC | Age: 27
Discharge: HOME | End: 2017-10-07
Attending: FAMILY MEDICINE | Admitting: FAMILY MEDICINE
Payer: MEDICARE

## 2017-10-07 ENCOUNTER — MYC MEDICAL ADVICE (OUTPATIENT)
Dept: FAMILY MEDICINE | Facility: OTHER | Age: 27
End: 2017-10-07

## 2017-10-07 ENCOUNTER — NURSE TRIAGE (OUTPATIENT)
Dept: NURSING | Facility: CLINIC | Age: 27
End: 2017-10-07

## 2017-10-07 VITALS
HEART RATE: 73 BPM | SYSTOLIC BLOOD PRESSURE: 116 MMHG | WEIGHT: 154.32 LBS | DIASTOLIC BLOOD PRESSURE: 74 MMHG | RESPIRATION RATE: 18 BRPM | TEMPERATURE: 98.4 F | OXYGEN SATURATION: 99 % | BODY MASS INDEX: 28.23 KG/M2

## 2017-10-07 DIAGNOSIS — A04.4: ICD-10-CM

## 2017-10-07 DIAGNOSIS — B96.21: ICD-10-CM

## 2017-10-07 LAB
ALBUMIN SERPL-MCNC: 4 G/DL (ref 3.4–5)
ALBUMIN UR-MCNC: NEGATIVE MG/DL
ALP SERPL-CCNC: 88 U/L (ref 40–150)
ALT SERPL W P-5'-P-CCNC: 29 U/L (ref 0–50)
ANION GAP SERPL CALCULATED.3IONS-SCNC: 7 MMOL/L (ref 3–14)
APPEARANCE UR: ABNORMAL
AST SERPL W P-5'-P-CCNC: 18 U/L (ref 0–45)
BACTERIA #/AREA URNS HPF: ABNORMAL /HPF
BASOPHILS # BLD AUTO: 0 10E9/L (ref 0–0.2)
BASOPHILS NFR BLD AUTO: 0 %
BILIRUB SERPL-MCNC: 0.5 MG/DL (ref 0.2–1.3)
BILIRUB UR QL STRIP: NEGATIVE
BUN SERPL-MCNC: 11 MG/DL (ref 7–30)
CALCIUM SERPL-MCNC: 9.3 MG/DL (ref 8.5–10.1)
CHLORIDE SERPL-SCNC: 106 MMOL/L (ref 94–109)
CO2 SERPL-SCNC: 27 MMOL/L (ref 20–32)
COLOR UR AUTO: YELLOW
CREAT SERPL-MCNC: 0.79 MG/DL (ref 0.52–1.04)
DIFFERENTIAL METHOD BLD: ABNORMAL
EOSINOPHIL # BLD AUTO: 0.1 10E9/L (ref 0–0.7)
EOSINOPHIL NFR BLD AUTO: 2.5 %
ERYTHROCYTE [DISTWIDTH] IN BLOOD BY AUTOMATED COUNT: 12.7 % (ref 10–15)
GFR SERPL CREATININE-BSD FRML MDRD: 86 ML/MIN/1.7M2
GLUCOSE SERPL-MCNC: 86 MG/DL (ref 70–99)
GLUCOSE UR STRIP-MCNC: NEGATIVE MG/DL
HCT VFR BLD AUTO: 38.6 % (ref 35–47)
HGB BLD-MCNC: 13.1 G/DL (ref 11.7–15.7)
HGB UR QL STRIP: NEGATIVE
IMM GRANULOCYTES # BLD: 0 10E9/L (ref 0–0.4)
IMM GRANULOCYTES NFR BLD: 0.3 %
KETONES UR STRIP-MCNC: 5 MG/DL
LEUKOCYTE ESTERASE UR QL STRIP: NEGATIVE
LYMPHOCYTES # BLD AUTO: 1 10E9/L (ref 0.8–5.3)
LYMPHOCYTES NFR BLD AUTO: 28.1 %
MCH RBC QN AUTO: 32.6 PG (ref 26.5–33)
MCHC RBC AUTO-ENTMCNC: 33.9 G/DL (ref 31.5–36.5)
MCV RBC AUTO: 96 FL (ref 78–100)
MONOCYTES # BLD AUTO: 0.4 10E9/L (ref 0–1.3)
MONOCYTES NFR BLD AUTO: 11.9 %
MUCOUS THREADS #/AREA URNS LPF: PRESENT /LPF
NEUTROPHILS # BLD AUTO: 2.1 10E9/L (ref 1.6–8.3)
NEUTROPHILS NFR BLD AUTO: 57.2 %
NITRATE UR QL: NEGATIVE
PH UR STRIP: 5 PH (ref 5–7)
PLATELET # BLD AUTO: 238 10E9/L (ref 150–450)
POTASSIUM SERPL-SCNC: 3.9 MMOL/L (ref 3.4–5.3)
PROT SERPL-MCNC: 7.8 G/DL (ref 6.8–8.8)
RBC # BLD AUTO: 4.02 10E12/L (ref 3.8–5.2)
RBC #/AREA URNS AUTO: 0 /HPF (ref 0–2)
SODIUM SERPL-SCNC: 140 MMOL/L (ref 133–144)
SOURCE: ABNORMAL
SP GR UR STRIP: 1.02 (ref 1–1.03)
SQUAMOUS #/AREA URNS AUTO: 1 /HPF (ref 0–1)
UROBILINOGEN UR STRIP-MCNC: 0 MG/DL (ref 0–2)
WBC # BLD AUTO: 3.6 10E9/L (ref 4–11)
WBC #/AREA URNS AUTO: 4 /HPF (ref 0–2)

## 2017-10-07 PROCEDURE — 85025 COMPLETE CBC W/AUTO DIFF WBC: CPT | Performed by: FAMILY MEDICINE

## 2017-10-07 PROCEDURE — 81001 URINALYSIS AUTO W/SCOPE: CPT | Performed by: FAMILY MEDICINE

## 2017-10-07 PROCEDURE — 25000128 H RX IP 250 OP 636: Performed by: FAMILY MEDICINE

## 2017-10-07 PROCEDURE — 96376 TX/PRO/DX INJ SAME DRUG ADON: CPT | Performed by: FAMILY MEDICINE

## 2017-10-07 PROCEDURE — 99285 EMERGENCY DEPT VISIT HI MDM: CPT | Mod: 25 | Performed by: FAMILY MEDICINE

## 2017-10-07 PROCEDURE — 96374 THER/PROPH/DIAG INJ IV PUSH: CPT | Performed by: FAMILY MEDICINE

## 2017-10-07 PROCEDURE — 96375 TX/PRO/DX INJ NEW DRUG ADDON: CPT | Performed by: FAMILY MEDICINE

## 2017-10-07 PROCEDURE — 80053 COMPREHEN METABOLIC PANEL: CPT | Performed by: FAMILY MEDICINE

## 2017-10-07 PROCEDURE — 96361 HYDRATE IV INFUSION ADD-ON: CPT | Performed by: FAMILY MEDICINE

## 2017-10-07 PROCEDURE — 99284 EMERGENCY DEPT VISIT MOD MDM: CPT | Mod: Z6 | Performed by: FAMILY MEDICINE

## 2017-10-07 RX ORDER — PROMETHAZINE HYDROCHLORIDE 25 MG/ML
25 INJECTION, SOLUTION INTRAMUSCULAR; INTRAVENOUS ONCE
Status: COMPLETED | OUTPATIENT
Start: 2017-10-07 | End: 2017-10-07

## 2017-10-07 RX ORDER — PROMETHAZINE HYDROCHLORIDE 25 MG/ML
12.5 INJECTION, SOLUTION INTRAMUSCULAR; INTRAVENOUS ONCE
Status: COMPLETED | OUTPATIENT
Start: 2017-10-07 | End: 2017-10-07

## 2017-10-07 RX ORDER — FENTANYL CITRATE 50 UG/ML
25 INJECTION, SOLUTION INTRAMUSCULAR; INTRAVENOUS EVERY 10 MIN PRN
Status: DISCONTINUED | OUTPATIENT
Start: 2017-10-07 | End: 2017-10-07 | Stop reason: HOSPADM

## 2017-10-07 RX ORDER — HYDROMORPHONE HYDROCHLORIDE 1 MG/ML
0.5 INJECTION, SOLUTION INTRAMUSCULAR; INTRAVENOUS; SUBCUTANEOUS
Status: DISCONTINUED | OUTPATIENT
Start: 2017-10-07 | End: 2017-10-07 | Stop reason: HOSPADM

## 2017-10-07 RX ORDER — SODIUM CHLORIDE 9 MG/ML
1000 INJECTION, SOLUTION INTRAVENOUS CONTINUOUS
Status: DISCONTINUED | OUTPATIENT
Start: 2017-10-07 | End: 2017-10-07 | Stop reason: HOSPADM

## 2017-10-07 RX ADMIN — FENTANYL CITRATE 25 MCG: 50 INJECTION, SOLUTION INTRAMUSCULAR; INTRAVENOUS at 15:01

## 2017-10-07 RX ADMIN — SODIUM CHLORIDE 1000 ML: 9 INJECTION, SOLUTION INTRAVENOUS at 14:16

## 2017-10-07 RX ADMIN — PROMETHAZINE HYDROCHLORIDE 25 MG: 25 INJECTION INTRAMUSCULAR; INTRAVENOUS at 14:28

## 2017-10-07 RX ADMIN — PROMETHAZINE HYDROCHLORIDE 12.5 MG: 25 INJECTION INTRAMUSCULAR; INTRAVENOUS at 15:58

## 2017-10-07 RX ADMIN — HYDROMORPHONE HYDROCHLORIDE 0.5 MG: 1 INJECTION, SOLUTION INTRAMUSCULAR; INTRAVENOUS; SUBCUTANEOUS at 15:30

## 2017-10-07 NOTE — TELEPHONE ENCOUNTER
Mom reports patient was diagnosed with E coli and she is unable to hold down foods or fluids.  Patient has been vomiting and having diarrhea at least 10 times per day for past week.  Patient reports signs of dehydration.  Triaged symptoms and advised emergency room and gave care advice per guideline.  Advised patient to call back if symptoms persist, or worsen. Caller verbalized understanding and is appreciative of information.    Reason for Disposition    [1] Drinking very little AND [2] dehydration suspected (e.g., no urine > 12 hours, very dry mouth, very lightheaded)    Additional Information    Negative: [1] SEVERE abdominal pain (e.g., excruciating) AND [2] present > 1 hour    Negative: [1] SEVERE abdominal pain AND [2] age > 60    Negative: [1] Blood in the stool AND [2] moderate or large amount of blood    Negative: Black or tarry bowel movements  (Exception: chronic-unchanged  black-grey bowel movements AND is taking iron pills or Pepto-bismol)    Protocols used: DIARRHEA-ADULT-AH

## 2017-10-07 NOTE — ED NOTES
Pt was here previosly, was being treated for C- dif, test came back negative, but found E coli positive.  Was told to stop medication , and stopped yesterday.  Pain to abdomen and increased nausea.

## 2017-10-07 NOTE — ED AVS SNAPSHOT
[facility] Emergency Department    [address]     [place] [address]    Phone:  [phone]    Fax:  [phone]                                       Katy Islas   MRN: [id number]    Department:  [facility] Emergency Department   Date of Visit:  10/7/2017           Patient Information     Date Of Birth          1990        Your diagnoses for this visit were:     Intestinal infection due to E. coli O157:H7        You were seen by Oswaldo Zamora MD.      Follow-up Information     Follow up with Aura Rosario PA-C. Schedule an appointment as soon as possible for a visit in 3 days.    Specialty:  Physician Assistant - Medical    Why:  For follow up on your ED stay    Contact information:    [address]  [facility] [address] [phone]        Discharge References/Attachments     GASTROENTERITIS, BACTERIAL (ADULT) (ENGLISH)      Future Appointments        Provider Department Dept Phone Center    10/9/2017 10:30 AM Natalie Bradley; ASL IS [facility] [phone] [facility]    10/12/2017 1:15 PM Dayana Roldan; Aura Rosario PA-C [facility] [phone] [facility]    10/17/2017 9:00 AM Shira Faustin; Aura Rosario PA-C [facility] [phone] [facility]      24 Hour Appointment Hotline       To make an appointment at any [facility], call [phone] ([phone]). If you don't have a family doctor or clinic, we will help you find one. [facility] are conveniently located to serve the needs of you and your family.             Review of your medicines      CONTINUE these medicines which may have CHANGED, or have new prescriptions. If we are uncertain of the size of tablets/capsules you have at home, strength may be listed as something that might have changed.        Dose / Directions Last dose taken    * promethazine 25 MG tablet   Commonly  known as:  PHENERGAN   Dose:  25 mg   What changed:  Another medication with the same name was added. Make sure you understand how and when to take each.   Quantity:  60 tablet        Take 1 tablet (25 mg) by mouth every 6 hours as needed for nausea   Refills:  3        * promethazine 6.25 MG/5ML syrup   Commonly known as:  PHENERGAN   Dose:  12.5 mg   What changed:  You were already taking a medication with the same name, and this prescription was added. Make sure you understand how and when to take each.   Quantity:  560 mL        Take 10 mLs (12.5 mg) by mouth 4 times daily as needed for nausea   Refills:  0        * Notice:  This list has 2 medication(s) that are the same as other medications prescribed for you. Read the directions carefully, and ask your doctor or other care provider to review them with you.      Our records show that you are taking the medicines listed below. If these are incorrect, please call your family doctor or clinic.        Dose / Directions Last dose taken    * albuterol 108 (90 BASE) MCG/ACT Inhaler   Commonly known as:  PROAIR HFA/PROVENTIL HFA/VENTOLIN HFA   Dose:  2 puff   Quantity:  1 Inhaler        Inhale 2 puffs into the lungs every 6 hours as needed for shortness of breath / dyspnea or wheezing   Refills:  1        * albuterol (2.5 MG/3ML) 0.083% neb solution   Dose:  1 vial   Quantity:  50 vial        Take 1 vial (2.5 mg) by nebulization every 6 hours as needed for shortness of breath / dyspnea or wheezing   Refills:  1        ARIPiprazole 5 MG tablet   Commonly known as:  ABILIFY   Dose:  5 mg   Quantity:  30 tablet        Take 1 tablet (5 mg) by mouth At Bedtime   Refills:  0        * cetirizine 10 MG tablet   Commonly known as:  zyrTEC   Dose:  10 mg        Take 10 mg by mouth daily   Refills:  0        * cetirizine 10 MG tablet   Commonly known as:  zyrTEC   Dose:  10 mg   Quantity:  90 tablet        Take 1 tablet (10 mg) by mouth every evening   Refills:  3         cyclobenzaprine 10 MG tablet   Commonly known as:  FLEXERIL   Dose:  20 mg   Quantity:  60 tablet        Take 2 tablets (20 mg) by mouth nightly as needed for muscle spasms or other (back pain)   Refills:  3        dexlansoprazole 60 MG Cpdr CR capsule   Commonly known as:  DEXILANT   Dose:  60 mg   Quantity:  30 capsule        Take 1 capsule (60 mg) by mouth daily   Refills:  3        diazepam 5 MG tablet   Commonly known as:  VALIUM   Dose:  2.5-5 mg   Quantity:  60 tablet        Take 0.5-1 tablets (2.5-5 mg) by mouth every 12 hours as needed for anxiety or sleep (MUSCLE SPASM) (one month supply)   Refills:  3        dicyclomine 20 MG tablet   Commonly known as:  BENTYL   Dose:  20 mg   Quantity:  30 tablet        Take 1 tablet (20 mg) by mouth 4 times daily as needed (ABDOMINAL CRAMPING) AS NEEDED FOR CRAMPING   Refills:  3        diphenoxylate-atropine 2.5-0.025 MG per tablet   Commonly known as:  LOMOTIL   Dose:  1 tablet   Quantity:  10 tablet        Take 1 tablet by mouth 4 times daily as needed for diarrhea   Refills:  0        DULoxetine 60 MG EC capsule   Commonly known as:  CYMBALTA   Dose:  60 mg   Quantity:  30 capsule        Take 1 capsule (60 mg) by mouth daily   Refills:  3        EPINEPHrine 0.3 MG/0.3ML injection 2-pack   Commonly known as:  EPIPEN/ADRENACLICK/or ANY BX GENERIC EQUIV   Dose:  0.3 mg   Quantity:  0.6 mL        Inject 0.3 mLs (0.3 mg) into the muscle once as needed for anaphylaxis   Refills:  1        FLEX-A-MIN JOINT FLEX PO        Take by mouth daily Reported on 3/28/2017   Refills:  0        fluticasone 50 MCG/ACT spray   Commonly known as:  FLONASE   Dose:  2 spray   Quantity:  1 Bottle        Spray 2 sprays into both nostrils daily   Refills:  3        gabapentin 300 MG capsule   Commonly known as:  NEURONTIN   Dose:  300 mg   Quantity:  90 capsule        Take 1 capsule (300 mg) by mouth 3 times daily   Refills:  3        guaiFENesin-codeine 100-10 MG/5ML Soln solution    Commonly known as:  ROBITUSSIN AC   Dose:  2 tsp.   Quantity:  120 mL        Take 10 mLs by mouth every 6 hours as needed for cough   Refills:  0        lidocaine 5 % Patch   Commonly known as:  LIDODERM   Quantity:  30 patch        Apply up to 3 patches to painful area at once for up to 12 h within a 24 h period.  Remove after 12 hours.   Refills:  3        meclizine 12.5 MG tablet   Commonly known as:  ANTIVERT   Dose:  12.5 mg   Quantity:  30 tablet        Take 1 tablet (12.5 mg) by mouth 4 times daily as needed for dizziness   Refills:  3        medical cannabis inhalation (Patient's own supply.  Not a prescription)        Inhale into the lungs 3 times daily as needed Reported on 3/28/2017   Refills:  0        medroxyPROGESTERone 150 MG/ML injection   Commonly known as:  DEPO-PROVERA   Dose:  150 mg   Quantity:  3 mL        Inject 1 mL (150 mg) into the muscle every 3 months   Refills:  3        metroNIDAZOLE 500 MG tablet   Commonly known as:  FLAGYL   Dose:  500 mg   Quantity:  40 tablet        Take 1 tablet (500 mg) by mouth 4 times daily for 10 days   Refills:  0        Multi-vitamin Tabs tablet   Dose:  1 tablet   Generic drug:  multivitamin, therapeutic with minerals        Take 1 tablet by mouth daily Reported on 3/28/2017   Refills:  0        ondansetron 4 MG ODT tab   Commonly known as:  ZOFRAN ODT   Dose:  4-8 mg   Quantity:  60 tablet        Take 1-2 tablets (4-8 mg) by mouth every 6 hours as needed for nausea Take 1-2 tablets by mouth as needed for nausea   Refills:  3        order for DME   Quantity:  1 each        Nebulizer with mask and tubing   Refills:  0        oxyCODONE 5 MG IR tablet   Commonly known as:  ROXICODONE   Dose:  5 mg   Quantity:  56 tablet        Take 1 tablet (5 mg) by mouth every 6 hours as needed for pain maximum 4 tablet(s) per day   Refills:  0        pantoprazole 40 MG EC tablet   Commonly known as:  PROTONIX   Dose:  40 mg   Quantity:  90 tablet        Take 1 tablet (40  mg) by mouth daily Take 30-60 minutes before a meal.   Refills:  3        rizatriptan 5 MG ODT tab   Commonly known as:  MAXALT-MLT   Dose:  5-10 mg   Quantity:  18 tablet        Take 1-2 tablets (5-10 mg) by mouth at onset of headache for migraine May repeat in 2 hours. Max 6 tablets/24 hours.   Refills:  3        senna-docusate 8.6-50 MG per tablet   Commonly known as:  SENOKOT-S;PERICOLACE   Dose:  1 tablet   Quantity:  60 tablet        Take 1 tablet by mouth 2 times daily as needed for constipation   Refills:  3        sucralfate 1 GM/10ML suspension   Commonly known as:  CARAFATE   Dose:  1 g   Quantity:  420 mL        Take 10 mLs (1 g) by mouth 4 times daily (before meals and nightly)   Refills:  3        * Notice:  This list has 4 medication(s) that are the same as other medications prescribed for you. Read the directions carefully, and ask your doctor or other care provider to review them with you.            Prescriptions were sent or printed at these locations (1 Prescription)                   [facility] [place], [place] - [address]    [address] [facility] [address]    Telephone:  [phone]   Fax:  [phone]   Hours:                  E-Prescribed (1 of 1)         promethazine (PHENERGAN) 6.25 MG/5ML syrup                Procedures and tests performed during your visit     CBC with platelets differential    Comprehensive metabolic panel    Peripheral IV: Standard    UA with Microscopic      Orders Needing Specimen Collection     None      Pending Results     No orders found from 10/5/2017 to 10/8/2017.            Pending Culture Results     No orders found from 10/5/2017 to 10/8/2017.            Pending Results Instructions     If you had any lab results that were not finalized at the time of your Discharge, you can call the ED Lab Result RN at [phone]. You will be contacted by this team for any positive Lab results or changes in treatment. The nurses are available 7  days a week from 10A to 6:30P.  You can leave a message 24 hours per day and they will return your call.        Thank you for choosing [facility]       Thank you for choosing [facility] for your care. Our goal is always to provide you with excellent care. Hearing back from our patients is one way we can continue to improve our services. Please take a few minutes to complete the written survey that you may receive in the mail after you visit with us. Thank you!        [employer] Information     [employer] gives you secure access to your electronic health record. If you see a primary care provider, you can also send messages to your care team and make appointments. If you have questions, please call your primary care clinic.  If you do not have a primary care provider, please call [phone] and they will assist you.        Care EveryWhere ID     This is your Care EveryWhere ID. This could be used by other organizations to access your [facility] medical records  [id number]        Equal Access to Services     VIKY VILLANUEVA : Ruth Chen, mayank kelly, champ ragland, susan santo . So [facility] [phone].    ATENCIÓN: Si habla español, tiene a kerr disposición servicios gratuitos de asistencia lingüística. Llame al [phone].    We comply with applicable federal civil rights laws and [place] laws. We do not discriminate on the basis of race, color, national origin, age, disability, sex, sexual orientation, or gender identity.            After Visit Summary       This is your record. Keep this with you and show to your community pharmacist(s) and doctor(s) at your next visit.

## 2017-10-07 NOTE — ED AVS SNAPSHOT
[facility] Emergency Department    [address] [place] [address]    Phone:  [phone]    Fax:  [phone]                                       Katy Islas   MRN: [id number]    Department:  [facility] Emergency Department   Date of Visit:  10/7/2017           After Visit Summary Signature Page     I have received my discharge instructions, and my questions have been answered. I have discussed any challenges I see with this plan with the nurse or doctor.    ..........................................................................................................................................  Patient/Patient Representative Signature      ..........................................................................................................................................  Patient Representative Print Name and Relationship to Patient    ..................................................               ................................................  Date                                            Time    ..........................................................................................................................................  Reviewed by Signature/Title    ...................................................              ..............................................  Date                                                            Time

## 2017-10-07 NOTE — ED PROVIDER NOTES
History     Chief Complaint   Patient presents with     Abdominal Pain     Nausea & Vomiting     HPI  Katy Islas is a 27 year old female who presents with continued abdominal pain.  Patient was seen here a couple days ago for abdominal pain and diarrhea.  Patient had stool studies that were done and came back positive for Shiga toxin.  Patient comes in today because she stated she is been vomiting and has not been able to keep any of her pain meds or any other medications down.  She states the pain is very crampy in nature.  She denies any new fevers or chills.  She states she has noticed no blood in her urine.  She states she has been itchy down in her vaginal area which he things could be related to a urinary tract infection.  She gets the symptoms before UTIs in the past.  Nothing seems to make the abdominal pain better or worse.    I have reviewed the Medications, Allergies, Past Medical and Surgical History, and Social History in the Epic system.        Review of Systems   All other systems reviewed and are negative.      Physical Exam   BP: 111/70  Pulse: 70  Temp: 98.4  F (36.9  C)  Resp: 16  Weight: 70 kg (154 lb 5.2 oz)  SpO2: 99 %  Physical Exam   Constitutional: She appears well-developed and well-nourished. No distress.   HENT:   Head: Normocephalic and atraumatic.   Mouth/Throat: Oropharynx is clear and moist. No oropharyngeal exudate.   Eyes: Conjunctivae are normal.   Neck: Normal range of motion.   Cardiovascular: Normal rate, regular rhythm and normal heart sounds.  Exam reveals no friction rub.    No murmur heard.  Pulmonary/Chest: Effort normal and breath sounds normal. No respiratory distress. She has no wheezes. She has no rales.   Abdominal: Soft. Bowel sounds are normal. She exhibits no distension. There is tenderness (diffuse, nonspecific). There is no rebound.   Musculoskeletal: Normal range of motion. She exhibits no edema.   Skin: She is not diaphoretic.   Nursing note and vitals  reviewed.      ED Course     ED Course     Procedures         Results for orders placed or performed during the hospital encounter of 10/07/17   CBC with platelets differential   Result Value Ref Range    WBC 3.6 (L) 4.0 - 11.0 10e9/L    RBC Count 4.02 3.8 - 5.2 10e12/L    Hemoglobin 13.1 11.7 - 15.7 g/dL    Hematocrit 38.6 35.0 - 47.0 %    MCV 96 78 - 100 fl    MCH 32.6 26.5 - 33.0 pg    MCHC 33.9 31.5 - 36.5 g/dL    RDW 12.7 10.0 - 15.0 %    Platelet Count 238 150 - 450 10e9/L    Diff Method Automated Method     % Neutrophils 57.2 %    % Lymphocytes 28.1 %    % Monocytes 11.9 %    % Eosinophils 2.5 %    % Basophils 0.0 %    % Immature Granulocytes 0.3 %    Absolute Neutrophil 2.1 1.6 - 8.3 10e9/L    Absolute Lymphocytes 1.0 0.8 - 5.3 10e9/L    Absolute Monocytes 0.4 0.0 - 1.3 10e9/L    Absolute Eosinophils 0.1 0.0 - 0.7 10e9/L    Absolute Basophils 0.0 0.0 - 0.2 10e9/L    Abs Immature Granulocytes 0.0 0 - 0.4 10e9/L   Comprehensive metabolic panel   Result Value Ref Range    Sodium 140 133 - 144 mmol/L    Potassium 3.9 3.4 - 5.3 mmol/L    Chloride 106 94 - 109 mmol/L    Carbon Dioxide 27 20 - 32 mmol/L    Anion Gap 7 3 - 14 mmol/L    Glucose 86 70 - 99 mg/dL    Urea Nitrogen 11 7 - 30 mg/dL    Creatinine 0.79 0.52 - 1.04 mg/dL    GFR Estimate 86 >60 mL/min/1.7m2    GFR Estimate If Black >90 >60 mL/min/1.7m2    Calcium 9.3 8.5 - 10.1 mg/dL    Bilirubin Total 0.5 0.2 - 1.3 mg/dL    Albumin 4.0 3.4 - 5.0 g/dL    Protein Total 7.8 6.8 - 8.8 g/dL    Alkaline Phosphatase 88 40 - 150 U/L    ALT 29 0 - 50 U/L    AST 18 0 - 45 U/L   UA with Microscopic   Result Value Ref Range    Color Urine Yellow     Appearance Urine Slightly Cloudy     Glucose Urine Negative NEG^Negative mg/dL    Bilirubin Urine Negative NEG^Negative    Ketones Urine 5 (A) NEG^Negative mg/dL    Specific Gravity Urine 1.020 1.003 - 1.035    Blood Urine Negative NEG^Negative    pH Urine 5.0 5.0 - 7.0 pH    Protein Albumin Urine Negative NEG^Negative mg/dL     Urobilinogen mg/dL 0.0 0.0 - 2.0 mg/dL    Nitrite Urine Negative NEG^Negative    Leukocyte Esterase Urine Negative NEG^Negative    Source Unspecified Urine     WBC Urine 4 (H) 0 - 2 /HPF    RBC Urine 0 0 - 2 /HPF    Bacteria Urine Few (A) NEG^Negative /HPF    Squamous Epithelial /HPF Urine 1 0 - 1 /HPF    Mucous Urine Present (A) NEG^Negative /LPF     *Note: Due to a large number of results and/or encounters for the requested time period, some results have not been displayed. A complete set of results can be found in Results Review.     Medications   0.9% sodium chloride BOLUS (1,000 mLs Intravenous New Bag 10/7/17 1416)     Followed by   0.9% sodium chloride infusion (not administered)   fentaNYL (PF) (SUBLIMAZE) injection 25 mcg (25 mcg Intravenous Given 10/7/17 1501)   HYDROmorphone (PF) (DILAUDID) injection 0.5 mg (0.5 mg Intravenous Given 10/7/17 1530)   promethazine (PHENERGAN) IV injection 25 mg (25 mg Intravenous Given 10/7/17 1428)   promethazine (PHENERGAN) IV injection 12.5 mg (12.5 mg Intravenous Given 10/7/17 1558)     labs are reviewed and were unremarkable.  I was worried about possible HUS but kidney function and urine appears to be normal.  Patient feels better after the above medications.  We discussed about not treating the E. coli infection as this can cause more harm than good.  Will continue symptomatic care.  I will discharge her home with oral Phenergan is a seems appropriate better for her nausea.  She will continue to use her home pain medications.  At this point is safe to discharge her home.  I will have her follow-up next week there is no improvement.    Assessments & Plan (with Medical Decision Making)  intestinal infection due to E. coli 0157:H7     I have reviewed the nursing notes.    I have reviewed the findings, diagnosis, plan and need for follow up with the patient.    New Prescriptions    PROMETHAZINE (PHENERGAN) 6.25 MG/5ML SYRUP    Take 10 mLs (12.5 mg) by mouth 4 times  daily as needed for nausea       Final diagnoses:   Intestinal infection due to E. coli O157:H7       10/7/2017   [facility] EMERGENCY DEPARTMENT     Oswaldo Zamora MD  10/07/17 7610

## 2017-10-09 ENCOUNTER — OFFICE VISIT (OUTPATIENT)
Dept: AUDIOLOGY | Facility: OTHER | Age: 27
End: 2017-10-09
Payer: MEDICARE

## 2017-10-09 ENCOUNTER — MYC MEDICAL ADVICE (OUTPATIENT)
Dept: FAMILY MEDICINE | Facility: OTHER | Age: 27
End: 2017-10-09

## 2017-10-09 ENCOUNTER — TELEPHONE (OUTPATIENT)
Dept: FAMILY MEDICINE | Facility: OTHER | Age: 27
End: 2017-10-09

## 2017-10-09 DIAGNOSIS — H90.3 SENSORY HEARING LOSS, BILATERAL: Primary | ICD-10-CM

## 2017-10-09 PROCEDURE — 99207 ZZC NO CHARGE LOS: CPT | Performed by: AUDIOLOGIST

## 2017-10-09 NOTE — MR AVS SNAPSHOT
After Visit Summary   10/9/2017    Katy Islas    MRN: [id number]           Patient Information     Date Of Birth          1990        Visit Information        Provider Department      10/9/2017 10:30 AM Aaron Parrish AuD; ASL IS [facility]        Today's Diagnoses     Sensory hearing loss, bilateral    -  1       Follow-ups after your visit        Your next 10 appointments already scheduled     Oct 12, 2017  1:15 PM CDT   Office Visit with Aura Rosario PA-C   [facility] ([facility])    [address] [place] [id number] [phone]           Bring a current list of meds and any records pertaining to this visit. For Physicals, please bring immunization records and any forms needing to be filled out. Please arrive 10 minutes early to complete paperwork.            Oct 17, 2017  9:00 AM CDT   Pre-Op physical with Aura Rosario PA-C   [facility] ([facility])    [address] [place] [id number] [phone]              Who to contact     If you have questions or need follow up information about today's clinic visit or your schedule please contact [facility] directly at [phone].  Normal or non-critical lab and imaging results will be communicated to you by MyChart, letter or phone within 4 business days after the clinic has received the results. If you do not hear from us within 7 days, please contact the clinic through [facility] or phone. If you have a critical or abnormal lab result, we will notify you by phone as soon as possible.  Submit refill requests through [facility] or call your pharmacy and they will forward the refill request to us. Please allow 3 business days for your refill to be completed.          Additional Information About Your Visit        Meteor Information     Meteor gives you secure access to your  electronic health record. If you see a primary care provider, you can also send messages to your care team and make appointments. If you have questions, please call your primary care clinic.  If you do not have a primary care provider, please call [phone] and they will assist you.        Care EveryWhere ID     This is your Care EveryWhere ID. This could be used by other organizations to access your [facility] medical records  [id number]         Blood Pressure from Last 3 Encounters:   10/07/17 116/74   10/05/17 108/71   09/29/17 96/66    Weight from Last 3 Encounters:   10/07/17 154 lb 5.2 oz (70 kg)   10/05/17 154 lb 4 oz (70 kg)   09/29/17 159 lb (72.1 kg)              We Performed the Following     HEARING AID REPAIR/PARTS/LABOR        Primary Care Provider Office Phone # Fax #    Aura EMILY Rosario PA-C [phone]       [address] [place] [address] [address]  [facility] [id number]        Equal Access to Services     [facility]: Hadii aad ku hadasho Soomaali, waaxda luqadaha, qaybta kaalmada adeegyada, waxay idiin haygeraldon norberto santo . So [facility] [phone].    ATENCIÓN: Si habla español, tiene a kerr disposición servicios gratuitos de asistencia lingüística. [facility] [phone].    We comply with applicable federal civil rights laws and [place] laws. We do not discriminate on the basis of race, color, national origin, age, disability, sex, sexual orientation, or gender identity.            Thank you!     Thank you for choosing [facility]  for your care. Our goal is always to provide you with excellent care. Hearing back from our patients is one way we can continue to improve our services. Please take a few minutes to complete the written survey that you may receive in the mail after your visit with us. Thank you!             Your Updated Medication List - Protect others around you: Learn how to safely use, store and throw away your medicines at www.disposemymeds.org.           This list is accurate as of: 10/9/17 12:20 PM.  Always use your most recent med list.                   Brand Name Dispense Instructions for use Diagnosis    * albuterol 108 (90 BASE) MCG/ACT Inhaler    PROAIR HFA/PROVENTIL HFA/VENTOLIN HFA    1 Inhaler    Inhale 2 puffs into the lungs every 6 hours as needed for shortness of breath / dyspnea or wheezing    Mild persistent asthma without complication       * albuterol (2.5 MG/3ML) 0.083% neb solution     50 vial    Take 1 vial (2.5 mg) by nebulization every 6 hours as needed for shortness of breath / dyspnea or wheezing    Mild persistent asthma without complication       ARIPiprazole 5 MG tablet    ABILIFY    30 tablet    Take 1 tablet (5 mg) by mouth At Bedtime    Adjustment disorder with mixed anxiety and depressed mood, Anxiety       * cetirizine 10 MG tablet    zyrTEC     Take 10 mg by mouth daily        * cetirizine 10 MG tablet    zyrTEC    90 tablet    Take 1 tablet (10 mg) by mouth every evening    Chronic seasonal allergic rhinitis due to pollen       cyclobenzaprine 10 MG tablet    FLEXERIL    60 tablet    Take 2 tablets (20 mg) by mouth nightly as needed for muscle spasms or other (back pain)    Chronic bilateral low back pain with left-sided sciatica       dexlansoprazole 60 MG Cpdr CR capsule    DEXILANT    30 capsule    Take 1 capsule (60 mg) by mouth daily    Gastroesophageal reflux disease without esophagitis       diazepam 5 MG tablet    VALIUM    60 tablet    Take 0.5-1 tablets (2.5-5 mg) by mouth every 12 hours as needed for anxiety or sleep (MUSCLE SPASM) (one month supply)    Chronic pain syndrome, Chronic bilateral low back pain with left-sided sciatica, Lumbar disc disease with radiculopathy, S/P lumbar fusion       dicyclomine 20 MG tablet    BENTYL    30 tablet    Take 1 tablet (20 mg) by mouth 4 times daily as needed (ABDOMINAL CRAMPING) AS NEEDED FOR CRAMPING    Hx of Crohn's disease       diphenoxylate-atropine 2.5-0.025 MG per  tablet    LOMOTIL    10 tablet    Take 1 tablet by mouth 4 times daily as needed for diarrhea        DULoxetine 60 MG EC capsule    CYMBALTA    30 capsule    Take 1 capsule (60 mg) by mouth daily    Adjustment disorder with mixed anxiety and depressed mood       EPINEPHrine 0.3 MG/0.3ML injection 2-pack    EPIPEN/ADRENACLICK/or ANY BX GENERIC EQUIV    0.6 mL    Inject 0.3 mLs (0.3 mg) into the muscle once as needed for anaphylaxis    Bee sting allergy       FLEX-A-MIN JOINT FLEX PO      Take by mouth daily Reported on 3/28/2017        fluticasone 50 MCG/ACT spray    FLONASE    1 Bottle    Spray 2 sprays into both nostrils daily    Seasonal allergic rhinitis due to pollen       gabapentin 300 MG capsule    NEURONTIN    90 capsule    Take 1 capsule (300 mg) by mouth 3 times daily    Chronic bilateral low back pain with left-sided sciatica       guaiFENesin-codeine 100-10 MG/5ML Soln solution    ROBITUSSIN AC    120 mL    Take 10 mLs by mouth every 6 hours as needed for cough    Cough       lidocaine 5 % Patch    LIDODERM    30 patch    Apply up to 3 patches to painful area at once for up to 12 h within a 24 h period.  Remove after 12 hours.    Lumbar disc disease with radiculopathy       meclizine 12.5 MG tablet    ANTIVERT    30 tablet    Take 1 tablet (12.5 mg) by mouth 4 times daily as needed for dizziness    Dizziness       medical cannabis inhalation (Patient's own supply.  Not a prescription)      Inhale into the lungs 3 times daily as needed Reported on 3/28/2017        medroxyPROGESTERone 150 MG/ML injection    DEPO-PROVERA    3 mL    Inject 1 mL (150 mg) into the muscle every 3 months    Encounter for surveillance of injectable contraceptive, Dysmenorrhea       metroNIDAZOLE 500 MG tablet    FLAGYL    40 tablet    Take 1 tablet (500 mg) by mouth 4 times daily for 10 days        Multi-vitamin Tabs tablet   Generic drug:  multivitamin, therapeutic with minerals      Take 1 tablet by mouth daily Reported on  3/28/2017        ondansetron 4 MG ODT tab    ZOFRAN ODT    60 tablet    Take 1-2 tablets (4-8 mg) by mouth every 6 hours as needed for nausea Take 1-2 tablets by mouth as needed for nausea    Nausea       order for DME     1 each    Nebulizer with mask and tubing    Mild persistent asthma without complication       oxyCODONE 5 MG IR tablet    ROXICODONE    56 tablet    Take 1 tablet (5 mg) by mouth every 6 hours as needed for pain maximum 4 tablet(s) per day    Chronic pain syndrome, Chronic bilateral low back pain with left-sided sciatica, S/P lumbar fusion, Lumbar disc disease with radiculopathy       pantoprazole 40 MG EC tablet    PROTONIX    90 tablet    Take 1 tablet (40 mg) by mouth daily Take 30-60 minutes before a meal.    Gastroesophageal reflux disease without esophagitis       * promethazine 25 MG tablet    PHENERGAN    60 tablet    Take 1 tablet (25 mg) by mouth every 6 hours as needed for nausea    Hx of Crohn's disease       * promethazine 6.25 MG/5ML syrup    PHENERGAN    560 mL    Take 10 mLs (12.5 mg) by mouth 4 times daily as needed for nausea        rizatriptan 5 MG ODT tab    MAXALT-MLT    18 tablet    Take 1-2 tablets (5-10 mg) by mouth at onset of headache for migraine May repeat in 2 hours. Max 6 tablets/24 hours.    Migraine with aura and without status migrainosus, not intractable       senna-docusate 8.6-50 MG per tablet    SENOKOT-S;PERICOLACE    60 tablet    Take 1 tablet by mouth 2 times daily as needed for constipation    Drug-induced constipation       sucralfate 1 GM/10ML suspension    CARAFATE    420 mL    Take 10 mLs (1 g) by mouth 4 times daily (before meals and nightly)    Abdominal pain, epigastric, Hematemesis with nausea       * Notice:  This list has 6 medication(s) that are the same as other medications prescribed for you. Read the directions carefully, and ask your doctor or other care provider to review them with you.

## 2017-10-09 NOTE — TELEPHONE ENCOUNTER
Katy Islas is a 27 year old female who presents with continued diarrhea symptoms. She is accompanied by another woman and an .    NURSING ASSESSMENT:  Description: Patient states she is feeling improved since her two ED visits recently. She has only had two episodes of diarrhea today. The vomiting has subsided but she is still quite nauseated. Her stomach pains are improved. She is urinating at least every 8 hours. Her lips do appear on the dryer side.   Precip. factors:  Seen in ED and found to have E. Coli in stool samples  Associated symptoms: None  Improves/worsens symptoms: Phenergan is helping - she takes this four times daily  Allergies:   Allergies   Allergen Reactions     Ativan [Lorazepam] Hives     At the IV site     Baclofen      hives     Bees Hives, Swelling and Difficulty breathing     Caffeine      Contrast Dye      Hives,   Updated 5/10/2016 CT Contrast.     Diazepam      IV form makes her become aggressive and angry     Iodine Hives     Methocarbamol Swelling     Metoclopramide      Other reaction(s): Tremors  LW Reaction: shaking/sweating     Midazolam      Other reaction(s): Agitation     Monosodium Glutamate      Morphine Other (See Comments)     Difficulty with urination     Nsaids Other (See Comments)     GI BLEED x2     Other (Do Not Use) Other (See Comments)     Xanaflex- pt becomes disoriented and loses bladder control     Percocet [Oxycodone-Acetaminophen] Itching     Reglan [Metoclopramide Hcl] Other (See Comments)     shaking     Soma [Carisoprodol] Visual Disturbance     Sleep walking     Tizanidine      Topamax Other (See Comments)     Topamax [Topiramate] Nausea     Tingling  GI/Vomit     Tramadol      Severe Headache, Seizure Risk     Tylenol W/Codeine [Acetaminophen-Codeine] Nausea and Itching     Tylenol 3     Valium Other (See Comments)     Becomes aggressive and angry     Versed Other (See Comments)     Zolmitriptan      Makes face feel like its twitching      Droperidol Anxiety     Flu Virus Vaccine Rash      Arm swelling       RECOMMENDED DISPOSITION:  Home care advice - Continue to hydrate with water and Gatorade. No juice (patient did not know this). No raw fruits or veggies. She will stick to bland foods as her appetite is coming back. Patient will follow up at visit on Thursday with PCP.  Will comply with recommendation: YES   If further questions/concerns or if Sx do not improve, worsen or new Sx develop, call your PCP or [facility] as soon as possible.    NOTES:  Disposition was determined by the first positive assessment question, therefore all previous assessment questions were negative.     Guideline used:  Telephone Triage Protocols for Nurses, Fifth Edition, Telma Roy, RN, BSN

## 2017-10-09 NOTE — PROGRESS NOTES
AUDIOLOGY REPORT: HEARING AID CHECK    SUBJECTIVE:  Katy Islas is a 27 year old female who was seen in the audiology clinic for a hearing aid check. The patient reports decreased performance from both hearing aids, and a broken battery doors on her right hearing aid.     OBJECTIVE:    Otoscopy:   RIGHT:  clear ear canal   LEFT:   clear ear canal     Broken battery door on right hearing aid unable to be repaired in the office. Will send in for  repair.    Biologic listening check revealed unremarkable hearing aid performance. Discussed potential moisture accumulation in earmold tubing or on microphone covers. Patient would like both hearing aids sent in for repair.    Patient interested in pursuing cochlear implantation on either her right or left side and requested more information. Will gather printed information for patient and give to her when she picks up repaired hearing aids.    ASSESSMENT:  Bilateral sensorineural hearing loss    Patient satisfied with plan to send both hearing aids in for in-warranty  repair.    PLAN:  It is recommended that the patient return to pick-up hearing aids when they return from repair, and schedule follow-up appointment if needed.    Please call this clinic with questions regarding these results or recommendations.    Amira Brown.  Licensed Audiologist, [place] #[id number]  [facility]  10/9/2017

## 2017-10-12 ENCOUNTER — OFFICE VISIT (OUTPATIENT)
Dept: FAMILY MEDICINE | Facility: OTHER | Age: 27
End: 2017-10-12
Payer: MEDICARE

## 2017-10-12 ENCOUNTER — TELEPHONE (OUTPATIENT)
Dept: FAMILY MEDICINE | Facility: OTHER | Age: 27
End: 2017-10-12

## 2017-10-12 VITALS
RESPIRATION RATE: 16 BRPM | HEART RATE: 82 BPM | TEMPERATURE: 98.9 F | BODY MASS INDEX: 28.35 KG/M2 | DIASTOLIC BLOOD PRESSURE: 74 MMHG | OXYGEN SATURATION: 98 % | WEIGHT: 155 LBS | SYSTOLIC BLOOD PRESSURE: 114 MMHG

## 2017-10-12 DIAGNOSIS — F32.0 MILD MAJOR DEPRESSION (H): ICD-10-CM

## 2017-10-12 DIAGNOSIS — K50.118 CROHN'S DISEASE OF COLON WITH OTHER COMPLICATION (H): Primary | ICD-10-CM

## 2017-10-12 DIAGNOSIS — R11.2 NAUSEA AND VOMITING, INTRACTABILITY OF VOMITING NOT SPECIFIED, UNSPECIFIED VOMITING TYPE: ICD-10-CM

## 2017-10-12 DIAGNOSIS — F43.23 ADJUSTMENT DISORDER WITH MIXED ANXIETY AND DEPRESSED MOOD: ICD-10-CM

## 2017-10-12 DIAGNOSIS — S32.2XXK CLOSED FRACTURE OF COCCYX WITH NONUNION, SUBSEQUENT ENCOUNTER: ICD-10-CM

## 2017-10-12 DIAGNOSIS — Z98.1 S/P LUMBAR FUSION: ICD-10-CM

## 2017-10-12 DIAGNOSIS — M51.16 LUMBAR DISC DISEASE WITH RADICULOPATHY: ICD-10-CM

## 2017-10-12 DIAGNOSIS — Z01.818 PRE-OP EXAM: ICD-10-CM

## 2017-10-12 DIAGNOSIS — G89.4 CHRONIC PAIN SYNDROME: ICD-10-CM

## 2017-10-12 DIAGNOSIS — F31.9 BIPOLAR AFFECTIVE DISORDER, REMISSION STATUS UNSPECIFIED (H): ICD-10-CM

## 2017-10-12 LAB
ANION GAP SERPL CALCULATED.3IONS-SCNC: 7 MMOL/L (ref 3–14)
APTT PPP: 44 SEC (ref 22–37)
BUN SERPL-MCNC: 12 MG/DL (ref 7–30)
CALCIUM SERPL-MCNC: 9.2 MG/DL (ref 8.5–10.1)
CHLORIDE SERPL-SCNC: 106 MMOL/L (ref 94–109)
CO2 SERPL-SCNC: 27 MMOL/L (ref 20–32)
CREAT SERPL-MCNC: 0.73 MG/DL (ref 0.52–1.04)
ERYTHROCYTE [DISTWIDTH] IN BLOOD BY AUTOMATED COUNT: 11.9 % (ref 10–15)
GFR SERPL CREATININE-BSD FRML MDRD: >90 ML/MIN/1.7M2
GLUCOSE SERPL-MCNC: 95 MG/DL (ref 70–99)
HCT VFR BLD AUTO: 37.2 % (ref 35–47)
HGB BLD-MCNC: 12.9 G/DL (ref 11.7–15.7)
INR PPP: 1.11 (ref 0.86–1.14)
MCH RBC QN AUTO: 33.2 PG (ref 26.5–33)
MCHC RBC AUTO-ENTMCNC: 34.7 G/DL (ref 31.5–36.5)
MCV RBC AUTO: 96 FL (ref 78–100)
PLATELET # BLD AUTO: 249 10E9/L (ref 150–450)
POTASSIUM SERPL-SCNC: 3.9 MMOL/L (ref 3.4–5.3)
RBC # BLD AUTO: 3.89 10E12/L (ref 3.8–5.2)
SODIUM SERPL-SCNC: 140 MMOL/L (ref 133–144)
WBC # BLD AUTO: 5.2 10E9/L (ref 4–11)

## 2017-10-12 PROCEDURE — 85027 COMPLETE CBC AUTOMATED: CPT | Performed by: PHYSICIAN ASSISTANT

## 2017-10-12 PROCEDURE — 85610 PROTHROMBIN TIME: CPT | Performed by: PHYSICIAN ASSISTANT

## 2017-10-12 PROCEDURE — 80048 BASIC METABOLIC PNL TOTAL CA: CPT | Performed by: PHYSICIAN ASSISTANT

## 2017-10-12 PROCEDURE — 85730 THROMBOPLASTIN TIME PARTIAL: CPT | Performed by: PHYSICIAN ASSISTANT

## 2017-10-12 PROCEDURE — 36415 COLL VENOUS BLD VENIPUNCTURE: CPT | Performed by: PHYSICIAN ASSISTANT

## 2017-10-12 PROCEDURE — 99214 OFFICE O/P EST MOD 30 MIN: CPT | Performed by: PHYSICIAN ASSISTANT

## 2017-10-12 PROCEDURE — T1013 SIGN LANG/ORAL INTERPRETER: HCPCS | Mod: U3 | Performed by: PHYSICIAN ASSISTANT

## 2017-10-12 ASSESSMENT — PAIN SCALES - GENERAL: PAINLEVEL: MODERATE PAIN (4)

## 2017-10-12 NOTE — TELEPHONE ENCOUNTER
Records and forms from [facility] received from Surgery coordinator [phone] - Dr. YOVANI Millan     - Confirmed coccygeal fracture and scheduled surgery for resection of coccyx.     - Labs needed for pre-op: CBC, BMP, INR, PTT     - No anti-inflammatory medications one week before and 6+ months after     - Fax pre-op to [facility] [phone] AND [facility] [phone]    Records sent to [facility] [place]  [facility]

## 2017-10-12 NOTE — MR AVS SNAPSHOT
After Visit Summary   10/12/2017    Katy Islas    MRN: [id number]           Patient Information     Date Of Birth          1990        Visit Information        Provider Department      10/12/2017 1:15 PM Dayana Roldan; Aura Rosario PA-C [facility]        Today's Diagnoses     Crohn's disease of colon with other complication (H)    -  1    Nausea and vomiting, intractability of vomiting not specified, unspecified vomiting type        Mild major depression (H)        Bipolar affective disorder, remission status unspecified (H)        Adjustment disorder with mixed anxiety and depressed mood        Chronic pain syndrome        Closed fracture of coccyx with nonunion, subsequent encounter        Lumbar disc disease with radiculopathy        S/P lumbar fusion        Pre-op exam           Follow-ups after your visit        Your next 10 appointments already scheduled     Oct 17, 2017  9:00 AM CDT   Pre-Op physical with Aura Rosario PA-C   [facility] ([facility]    [address] [place] [id number] [phone]              Who to contact     If you have questions or need follow up information about today's clinic visit or your schedule please contact [facility] directly at [phone].  Normal or non-critical lab and imaging results will be communicated to you by MyChart, letter or phone within 4 business days after the clinic has received the results. If you do not hear from us within 7 days, please contact the clinic through MyChart or phone. If you have a critical or abnormal lab result, we will notify you by phone as soon as possible.  Submit refill requests through [employer] or call your pharmacy and they will forward the refill request to us. Please allow 3 business days for your refill to be completed.          Additional Information About Your Visit        MyChart Information      [employer] gives you secure access to your electronic health record. If you see a primary care provider, you can also send messages to your care team and make appointments. If you have questions, please call your primary care clinic.  If you do not have a primary care provider, please call [phone] and they will assist you.        Care EveryWhere ID     This is your Care EveryWhere ID. This could be used by other organizations to access your [facility] medical records  [id number]        Your Vitals Were     Pulse Temperature Respirations Pulse Oximetry BMI (Body Mass Index)       82 98.9  F (37.2  C) (Oral) 16 98% 28.35 kg/m2        Blood Pressure from Last 3 Encounters:   10/12/17 114/74   10/07/17 116/74   10/05/17 108/71    Weight from Last 3 Encounters:   10/12/17 155 lb (70.3 kg)   10/07/17 154 lb 5.2 oz (70 kg)   10/05/17 154 lb 4 oz (70 kg)              We Performed the Following     Basic metabolic panel     CBC with platelets     INR     Partial thromboplastin time        Primary Care Provider Office Phone # Fax #    Aura L LOVE Rosario [phone]       [address] [facility] [address]  [facility] [address]        Equal Access to Services     VIKY VILLANUEVA : Hadii aad ku hadasho Soomaali, waaxda luqadaha, qaybta kaalmada adeegyada, waxay idiin haygeraldon norberto trinidad laadri . So [facility] [phone].    ATENCIÓN: Si habla español, tiene a kerr disposición servicios gratuitos de asistencia lingüística. Llame al [phone].    We comply with applicable federal civil rights laws and [place] laws. We do not discriminate on the basis of race, color, national origin, age, disability, sex, sexual orientation, or gender identity.            Thank you!     Thank you for choosing [facility]  for your care. Our goal is always to provide you with excellent care. Hearing back from our patients is one way we can continue to improve our services. Please take a few minutes to complete  the written survey that you may receive in the mail after your visit with us. Thank you!             Your Updated Medication List - Protect others around you: Learn how to safely use, store and throw away your medicines at www.disposemMabayaeds.org.          This list is accurate as of: 10/12/17  3:26 PM.  Always use your most recent med list.                   Brand Name Dispense Instructions for use Diagnosis    * albuterol 108 (90 BASE) MCG/ACT Inhaler    PROAIR HFA/PROVENTIL HFA/VENTOLIN HFA    1 Inhaler    Inhale 2 puffs into the lungs every 6 hours as needed for shortness of breath / dyspnea or wheezing    Mild persistent asthma without complication       * albuterol (2.5 MG/3ML) 0.083% neb solution     50 vial    Take 1 vial (2.5 mg) by nebulization every 6 hours as needed for shortness of breath / dyspnea or wheezing    Mild persistent asthma without complication       ARIPiprazole 5 MG tablet    ABILIFY    30 tablet    Take 1 tablet (5 mg) by mouth At Bedtime    Adjustment disorder with mixed anxiety and depressed mood, Anxiety       * cetirizine 10 MG tablet    zyrTEC     Take 10 mg by mouth daily        * cetirizine 10 MG tablet    zyrTEC    90 tablet    Take 1 tablet (10 mg) by mouth every evening    Chronic seasonal allergic rhinitis due to pollen       cyclobenzaprine 10 MG tablet    FLEXERIL    60 tablet    Take 2 tablets (20 mg) by mouth nightly as needed for muscle spasms or other (back pain)    Chronic bilateral low back pain with left-sided sciatica       dexlansoprazole 60 MG Cpdr CR capsule    DEXILANT    30 capsule    Take 1 capsule (60 mg) by mouth daily    Gastroesophageal reflux disease without esophagitis       diazepam 5 MG tablet    VALIUM    60 tablet    Take 0.5-1 tablets (2.5-5 mg) by mouth every 12 hours as needed for anxiety or sleep (MUSCLE SPASM) (one month supply)    Chronic pain syndrome, Chronic bilateral low back pain with left-sided sciatica, Lumbar disc disease with  radiculopathy, S/P lumbar fusion       dicyclomine 20 MG tablet    BENTYL    30 tablet    Take 1 tablet (20 mg) by mouth 4 times daily as needed (ABDOMINAL CRAMPING) AS NEEDED FOR CRAMPING    Hx of Crohn's disease       diphenoxylate-atropine 2.5-0.025 MG per tablet    LOMOTIL    10 tablet    Take 1 tablet by mouth 4 times daily as needed for diarrhea        DULoxetine 60 MG EC capsule    CYMBALTA    30 capsule    Take 1 capsule (60 mg) by mouth daily    Adjustment disorder with mixed anxiety and depressed mood       EPINEPHrine 0.3 MG/0.3ML injection 2-pack    EPIPEN/ADRENACLICK/or ANY BX GENERIC EQUIV    0.6 mL    Inject 0.3 mLs (0.3 mg) into the muscle once as needed for anaphylaxis    Bee sting allergy       FLEX-A-MIN JOINT FLEX PO      Take by mouth daily Reported on 3/28/2017        fluticasone 50 MCG/ACT spray    FLONASE    1 Bottle    Spray 2 sprays into both nostrils daily    Seasonal allergic rhinitis due to pollen       gabapentin 300 MG capsule    NEURONTIN    90 capsule    Take 1 capsule (300 mg) by mouth 3 times daily    Chronic bilateral low back pain with left-sided sciatica       guaiFENesin-codeine 100-10 MG/5ML Soln solution    ROBITUSSIN AC    120 mL    Take 10 mLs by mouth every 6 hours as needed for cough    Cough       lidocaine 5 % Patch    LIDODERM    30 patch    Apply up to 3 patches to painful area at once for up to 12 h within a 24 h period.  Remove after 12 hours.    Lumbar disc disease with radiculopathy       meclizine 12.5 MG tablet    ANTIVERT    30 tablet    Take 1 tablet (12.5 mg) by mouth 4 times daily as needed for dizziness    Dizziness       medical cannabis inhalation (Patient's own supply.  Not a prescription)      Inhale into the lungs 3 times daily as needed Reported on 3/28/2017        medroxyPROGESTERone 150 MG/ML injection    DEPO-PROVERA    3 mL    Inject 1 mL (150 mg) into the muscle every 3 months    Encounter for surveillance of injectable contraceptive,  Dysmenorrhea       metroNIDAZOLE 500 MG tablet    FLAGYL    40 tablet    Take 1 tablet (500 mg) by mouth 4 times daily for 10 days        Multi-vitamin Tabs tablet   Generic drug:  multivitamin, therapeutic with minerals      Take 1 tablet by mouth daily Reported on 3/28/2017        NONFORMULARY      Apply 30 mLs topically 4 times daily        ondansetron 4 MG ODT tab    ZOFRAN ODT    60 tablet    Take 1-2 tablets (4-8 mg) by mouth every 6 hours as needed for nausea Take 1-2 tablets by mouth as needed for nausea    Nausea       order for DME     1 each    Nebulizer with mask and tubing    Mild persistent asthma without complication       oxyCODONE 5 MG IR tablet    ROXICODONE    56 tablet    Take 1 tablet (5 mg) by mouth every 6 hours as needed for pain maximum 4 tablet(s) per day    Chronic pain syndrome, Chronic bilateral low back pain with left-sided sciatica, S/P lumbar fusion, Lumbar disc disease with radiculopathy       pantoprazole 40 MG EC tablet    PROTONIX    90 tablet    Take 1 tablet (40 mg) by mouth daily Take 30-60 minutes before a meal.    Gastroesophageal reflux disease without esophagitis       * promethazine 25 MG tablet    PHENERGAN    60 tablet    Take 1 tablet (25 mg) by mouth every 6 hours as needed for nausea    Hx of Crohn's disease       * promethazine 6.25 MG/5ML syrup    PHENERGAN    560 mL    Take 10 mLs (12.5 mg) by mouth 4 times daily as needed for nausea        rizatriptan 5 MG ODT tab    MAXALT-MLT    18 tablet    Take 1-2 tablets (5-10 mg) by mouth at onset of headache for migraine May repeat in 2 hours. Max 6 tablets/24 hours.    Migraine with aura and without status migrainosus, not intractable       senna-docusate 8.6-50 MG per tablet    SENOKOT-S;PERICOLACE    60 tablet    Take 1 tablet by mouth 2 times daily as needed for constipation    Drug-induced constipation       sucralfate 1 GM/10ML suspension    CARAFATE    420 mL    Take 10 mLs (1 g) by mouth 4 times daily (before meals  and nightly)    Abdominal pain, epigastric, Hematemesis with nausea       * Notice:  This list has 6 medication(s) that are the same as other medications prescribed for you. Read the directions carefully, and ask your doctor or other care provider to review them with you.

## 2017-10-12 NOTE — PROGRESS NOTES
[address]  [facility] [address]  [phone]  Dept: [phone]    PRE-OP EVALUATION:  Today's date: 10/17/2017    Katy Islas (: 1990) presents for pre-operative evaluation assessment as requested by Dr. Millan.  She requires evaluation and anesthesia risk assessment prior to undergoing surgery/procedure for treatment of coccygeal fracture.  Proposed procedure: Coccygeal resection     Date of Surgery/ Procedure: 10/18/17  Time of Surgery/ Procedure: 8AM  Hospital/Surgical Facility: [facility]    Fax number for surgical facility: [phone]  Primary Physician: Aura Rosario  Type of Anesthesia Anticipated: General    Patient has a Health Care Directive or Living Will:  NO    Preop Questions 10/17/2017   1.  Do you have a history of heart attack, stroke, stent, bypass or surgery on an artery in the head, neck, heart or legs? No   2.  Do you ever have any pain or discomfort in your chest? No   3.  Do you have a history of  Heart Failure? No   4.   Are you troubled by shortness of breath when:  walking on a level surface, or up a slight hill, or at night? No   5.  Do you currently have a cold, bronchitis or other respiratory infection? No   6.  Do you have a cough, shortness of breath, or wheezing? No   7.  Do you sometimes get pains in the calves of your legs when you walk? No   8. Do you or anyone in your family have previous history of blood clots? No   9.  Do you or does anyone in your family have a serious bleeding problem such as prolonged bleeding following surgeries or cuts? No   10. Have you ever had problems with anemia or been told to take iron pills? YES -      11. Have you had any abnormal blood loss such as black, tarry or bloody stools, or abnormal vaginal bleeding? YES - Anemia after GI bleed due to NSAID issues, hemoglobin 13.1 on 10/7/17, has been  normalized since 7/1/17    12. Have you ever had a blood transfusion? No   13. Have you or any of your relatives ever had problems with anesthesia? No   14. Do you have sleep apnea, excessive snoring or daytime drowsiness? No   15. Do you have any prosthetic heart valves? No   16. Do you have prosthetic joints? No   17. Is there any chance that you may be pregnant? No           HPI:                                                      Brief HPI related to upcoming procedure: Fractured Coccyx found on MRI exam, patient had back surgery in Feb, failed to improve so additional imaging was obtained and found fracture     See problem list for active medical problems.  Problems all longstanding and stable, except as noted/documented.  See ROS for pertinent symptoms related to these conditions.                                                                                                      ASTHMA - Patient has a longstanding history of asthma. Patient has been doing well overall noting no symptoms and continues on medication regimen consisting of albuterol without adverse reactions or side effects.                                                                                                                                                 MEDICAL HISTORY:                                                    Patient Active Problem List    Diagnosis Date Noted     Closed fracture of coccyx with nonunion, subsequent encounter 10/12/2017     Priority: Medium     Upper GI bleed - suspected 07/01/2017     Priority: Medium     Anemia due to blood loss, acute 07/01/2017     Priority: Medium     Tobacco abuse 07/01/2017     Priority: Medium     History of pseudoseizure 07/01/2017     Priority: Medium     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) 03/10/2017     Priority: Medium     Lumbar disc disease with radiculopathy 02/23/2017     Priority: Medium     S/P lumbar fusion 02/23/2017     Priority: Medium     Dr. PINA  Yana, 2/23/17       Vitamin D deficiency 01/06/2017     Priority: Medium     Atypical mole 01/06/2017     Priority: Medium     Mild persistent asthma without complication 12/02/2016     Priority: Medium     Chronic bilateral low back pain with left-sided sciatica 12/02/2016     Priority: Medium     Bee sting allergy 09/16/2016     Priority: Medium     Gastroesophageal reflux disease without esophagitis 08/26/2016     Priority: Medium     Herpes simplex virus infection 11/12/2015     Priority: Medium     Adjustment disorder with mixed anxiety and depressed mood 10/14/2015     Priority: Medium     Marijuana abuse 02/22/2015     Priority: Medium     Health Care Home 11/25/2013     Priority: Medium       Status:  Unable to reach  Care Coordinator:  REMY Beard, LGCHRISTIANNE  11/25/2013    10:39 AM  Clinic Care Coordination            Grand mal seizure disorder (H) 10/08/2013     Priority: Medium     Bipolar disorder (H) 01/31/2013     Priority: Medium     Problem list name updated by automated process. Provider to review       Nausea and vomiting 12/13/2012     Priority: Medium     Abdominal pain 12/13/2012     Priority: Medium     Oral thrush 06/25/2012     Priority: Medium     Chronic pain 02/09/2012     Priority: Medium     Patient is followed by Aura Rosario PA-C for ongoing prescription of pain medication.  All refills should only be approved by this provider, or covering partner.    Medication(s): Norco    Maximum quantity per month: 70  Clinic visit frequency required: Q 2 months     Controlled substance agreement:   Discussed and signed 4/25/17    Encounter-Level CSA - 01/12/2015:                 Controlled Substance Agreement - Scan on 1/26/2015  9:14 AM : Controlled Medication Agreement-01/12/15 (below)            Pain Clinic evaluation in the past: Yes       Date/Location:   [facility], fall 2016    DIRE Total Score(s):    4/25/2017   Total Score 17       Last  [facility] website verification:  done on 4/25/17 by LOVE Maki   [email or web address]           Anxiety 09/22/2011     Priority: Medium     Mild major depression (H) 08/29/2011     Priority: Medium     Fibromyalgia 12/17/2009     Priority: Medium     Crohn's colitis (H) 08/04/2009     Priority: Medium     Dysmenorrhea 05/11/2007     Priority: Medium     Benign neoplasm of skin of lower limb, including hip 03/16/2004     Priority: Medium     Migraine      Priority: Medium     Dr. Farrar - Neurology.  Now on Inderal.  Seems to be working.  Follow-up 9/08  Problem list name updated by automated process. Provider to review       Hearing loss      Priority: Medium     Congenital  Problem list name updated by automated process. Provider to review       Allergic rhinitis      Priority: Medium     Problem list name updated by automated process. Provider to review        Past Medical History:   Diagnosis Date     Abdominal pain 10/31- 11/4/2005    [facility] admit for Crohn's     Allergic rhinitis, cause unspecified     Allergic rhinitis     Arthritis      C. difficile diarrhea     Past, no current diarrhea.     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, without mention of intractable epilepsy     pseudoseizures diagnosed after extensive neurologic eval     Migraine 07/21/12    D/C 07/22/12-[facility]     Migraine, unspecified, without mention of intractable migraine without mention of status migrainosus     Migraine     Mild intermittent asthma     mild intermittent     Mycoplasma infection in conditions  classified elsewhere and of unspecified site      Other chronic pain     Back pain for 6 years     Renal disease     Kidney stones     Unspecified hearing loss     congenital hearing loss     Past Surgical History:   Procedure Laterality Date     COLONOSCOPY  7/1/2009    [facility].     FUSION SPINE POSTERIOR MINIMALLY INVASIVE ONE LEVEL N/A 2/23/2017    Procedure: FUSION SPINE POSTERIOR MINIMALLY INVASIVE ONE LEVEL;  L4-5 Oblique Lateral Lumbar Interbody Fusion   Epidural steroid injection.   Transpedicular Bone marrow aspiration;  Surgeon: Jeniffer Eugene MD;  Location:  OR     HC COLONOSCOPY THRU STOMA WITH BIOPSY  10/29/2003    Impression is that of normal appearing colonoscopy, without evidence of rectal bleeding.     HC COLONOSCOPY THRU STOMA, DIAGNOSTIC  10/00    normal     HC COLONOSCOPY THRU STOMA, DIAGNOSTIC  Oct 2009    Dr López- normal     HC EEG AWAKE AND SLEEP      abnormal     HC MRI BRAIN W/O CONTRAST  12/00    normal     HC REMOVAL GALLBLADDER  8/5/2009    [facility].      UGI ENDOSCOPY DIAG W BIOPSY  11/11/09    Normal esophagus     HC UGI ENDOSCOPY, SIMPLE EXAM  7/00, 10/00    mild chronic esophagitis and duodenitis, neg H pylori     HC UGI ENDOSCOPY, SIMPLE EXAM  01/20/2005    Esophagogastroduodenoscopy, colonoscopy with biopsies.  [facility] UGI ENDOSCOPY, SIMPLE EXAM  7/1/2009    [facility].      UGI ENDOSCOPY, SIMPLE EXAM  11/11/2009    attempted upper GI, pt. could not tolerate procedure:[facility]     ORTHOPEDIC SURGERY  October 19,2011    diskectomy L4-L5     Current Outpatient Prescriptions   Medication Sig Dispense Refill     NONFORMULARY Apply 30 mLs topically 4 times daily       promethazine (PHENERGAN) 6.25 MG/5ML syrup Take 10 mLs (12.5 mg) by mouth 4 times daily as needed for nausea 560 mL 0     diphenoxylate-atropine (LOMOTIL) 2.5-0.025 MG per tablet Take 1 tablet by mouth 4  times daily as needed for diarrhea 10 tablet 0     cetirizine (ZYRTEC) 10 MG tablet Take 1 tablet (10 mg) by mouth every evening 90 tablet 3     oxyCODONE (ROXICODONE) 5 MG IR tablet Take 1 tablet (5 mg) by mouth every 6 hours as needed for pain maximum 4 tablet(s) per day 56 tablet 0     diazepam (VALIUM) 5 MG tablet Take 0.5-1 tablets (2.5-5 mg) by mouth every 12 hours as needed for anxiety or sleep (MUSCLE SPASM) (one month supply) 60 tablet 3     gabapentin (NEURONTIN) 300 MG capsule Take 1 capsule (300 mg) by mouth 3 times daily 90 capsule 3     cyclobenzaprine (FLEXERIL) 10 MG tablet Take 2 tablets (20 mg) by mouth nightly as needed for muscle spasms or other (back pain) 60 tablet 3     DULoxetine (CYMBALTA) 60 MG EC capsule Take 1 capsule (60 mg) by mouth daily 30 capsule 3     dexlansoprazole (DEXILANT) 60 MG CPDR CR capsule Take 1 capsule (60 mg) by mouth daily 30 capsule 3     albuterol (PROAIR HFA/PROVENTIL HFA/VENTOLIN HFA) 108 (90 BASE) MCG/ACT Inhaler Inhale 2 puffs into the lungs every 6 hours as needed for shortness of breath / dyspnea or wheezing 1 Inhaler 1     dicyclomine (BENTYL) 20 MG tablet Take 1 tablet (20 mg) by mouth 4 times daily as needed (ABDOMINAL CRAMPING) AS NEEDED FOR CRAMPING 30 tablet 3     senna-docusate (SENOKOT-S;PERICOLACE) 8.6-50 MG per tablet Take 1 tablet by mouth 2 times daily as needed for constipation 60 tablet 3     albuterol (2.5 MG/3ML) 0.083% neb solution Take 1 vial (2.5 mg) by nebulization every 6 hours as needed for shortness of breath / dyspnea or wheezing 50 vial 1     order for DME Nebulizer with mask and tubing 1 each 0     ARIPiprazole (ABILIFY) 5 MG tablet Take 1 tablet (5 mg) by mouth At Bedtime 30 tablet 0     meclizine (ANTIVERT) 12.5 MG tablet Take 1 tablet (12.5 mg) by mouth 4 times daily as needed for dizziness 30 tablet 3     ondansetron (ZOFRAN ODT) 4 MG ODT tab Take 1-2 tablets (4-8 mg) by mouth every 6 hours as needed for nausea Take 1-2 tablets by  mouth as needed for nausea 60 tablet 3     rizatriptan (MAXALT-MLT) 5 MG ODT tab Take 1-2 tablets (5-10 mg) by mouth at onset of headache for migraine May repeat in 2 hours. Max 6 tablets/24 hours. 18 tablet 3     medroxyPROGESTERone (DEPO-PROVERA) 150 MG/ML injection Inject 1 mL (150 mg) into the muscle every 3 months 3 mL 3     sucralfate (CARAFATE) 1 GM/10ML suspension Take 10 mLs (1 g) by mouth 4 times daily (before meals and nightly) 420 mL 3     cetirizine (ZYRTEC) 10 MG tablet Take 10 mg by mouth daily       promethazine (PHENERGAN) 25 MG tablet Take 1 tablet (25 mg) by mouth every 6 hours as needed for nausea 60 tablet 3     pantoprazole (PROTONIX) 40 MG EC tablet Take 1 tablet (40 mg) by mouth daily Take 30-60 minutes before a meal. 90 tablet 3     lidocaine (LIDODERM) 5 % Patch Apply up to 3 patches to painful area at once for up to 12 h within a 24 h period.  Remove after 12 hours. 30 patch 3     fluticasone (FLONASE) 50 MCG/ACT spray Spray 2 sprays into both nostrils daily 1 Bottle 3     medical cannabis inhalation (Patient's own supply.  Not a prescription) Inhale into the lungs 3 times daily as needed Reported on 3/28/2017       EPINEPHrine (EPIPEN) 0.3 MG/0.3ML injection Inject 0.3 mLs (0.3 mg) into the muscle once as needed for anaphylaxis 0.6 mL 1     Misc Natural Products (FLEX-A-MIN JOINT FLEX PO) Take by mouth daily Reported on 3/28/2017       Multiple Vitamin (MULTI-VITAMIN) per tablet Take 1 tablet by mouth daily Reported on 3/28/2017       OTC products: None, except as noted above    Allergies   Allergen Reactions     Ativan [Lorazepam] Hives     At the IV site     Baclofen      hives     Bees Hives, Swelling and Difficulty breathing     Caffeine      Contrast Dye      Hives,   Updated 5/10/2016 CT Contrast.     Diazepam      IV form makes her become aggressive and angry     Iodine Hives     Methocarbamol Swelling     Metoclopramide      Other reaction(s): Tremors  LW Reaction:  shaking/sweating     Midazolam      Other reaction(s): Agitation     Monosodium Glutamate      Morphine Other (See Comments)     Difficulty with urination     Nsaids Other (See Comments)     GI BLEED x2     Other (Do Not Use) Other (See Comments)     Xanaflex- pt becomes disoriented and loses bladder control     Percocet [Oxycodone-Acetaminophen] Itching     Reglan [Metoclopramide Hcl] Other (See Comments)     shaking     Soma [Carisoprodol] Visual Disturbance     Sleep walking     Tizanidine      Topamax Other (See Comments)     Topamax [Topiramate] Nausea     Tingling  GI/Vomit     Tramadol      Severe Headache, Seizure Risk     Tylenol W/Codeine [Acetaminophen-Codeine] Nausea and Itching     Tylenol 3     Valium Other (See Comments)     Becomes aggressive and angry     Versed Other (See Comments)     Zolmitriptan      Makes face feel like its twitching     Droperidol Anxiety     Flu Virus Vaccine Rash      Arm swelling      Latex Allergy: NO    Social History   Substance Use Topics     Smoking status: Current Every Day Smoker     Packs/day: 0.25     Types: Cigarettes     Smokeless tobacco: Never Used     Alcohol use 0.6 oz/week     1 Cans of beer per week      Comment: 1-2 drinks montlhly     History   Drug Use     Yes     Comment: Medical Marijuana currently       REVIEW OF SYSTEMS:                                                    Constitutional, neuro, ENT, endocrine, pulmonary, cardiac, gastrointestinal, genitourinary, musculoskeletal, integument and psychiatric systems are negative, except as otherwise noted.      EXAM:                                                    /70 (BP Location: Right arm, Patient Position: Chair, Cuff Size: Adult Large)  Pulse 124  Temp 98.4  F (36.9  C) (Temporal)  Resp 22  Wt 154 lb (69.9 kg)  SpO2 100%  BMI 28.17 kg/m2    GENERAL APPEARANCE: healthy, alert and no distress     EYES: EOMI, PERRL     HENT: ear canals and TM's normal and nose and mouth without ulcers or  lesions     NECK: no adenopathy, no asymmetry, masses, or scars and thyroid normal to palpation     RESP: lungs clear to auscultation - no rales, rhonchi or wheezes     CV: regular rates and rhythm, normal S1 S2, no S3 or S4 and no murmur, click or rub     ABDOMEN:  soft, nontender, no HSM or masses and bowel sounds normal     MS: extremities normal- no gross deformities noted, no evidence of inflammation in joints, FROM in all extremities.     SKIN: no suspicious lesions or rashes     NEURO: Normal strength and tone, sensory exam grossly normal, mentation intact and speech normal     PSYCH: mentation appears normal. and affect normal/bright     LYMPHATICS: No axillary, cervical, or supraclavicular nodes    DIAGNOSTICS:                                                      EKG: Not indicated due to non-vascular surgery and low risk of event (age <65 and without cardiac risk factors)      Labs Resulted Today:   Results for orders placed or performed in visit on 10/12/17   Basic metabolic panel   Result Value Ref Range    Sodium 140 133 - 144 mmol/L    Potassium 3.9 3.4 - 5.3 mmol/L    Chloride 106 94 - 109 mmol/L    Carbon Dioxide 27 20 - 32 mmol/L    Anion Gap 7 3 - 14 mmol/L    Glucose 95 70 - 99 mg/dL    Urea Nitrogen 12 7 - 30 mg/dL    Creatinine 0.73 0.52 - 1.04 mg/dL    GFR Estimate >90 >60 mL/min/1.7m2    GFR Estimate If Black >90 >60 mL/min/1.7m2    Calcium 9.2 8.5 - 10.1 mg/dL   CBC with platelets   Result Value Ref Range    WBC 5.2 4.0 - 11.0 10e9/L    RBC Count 3.89 3.8 - 5.2 10e12/L    Hemoglobin 12.9 11.7 - 15.7 g/dL    Hematocrit 37.2 35.0 - 47.0 %    MCV 96 78 - 100 fl    MCH 33.2 (H) 26.5 - 33.0 pg    MCHC 34.7 31.5 - 36.5 g/dL    RDW 11.9 10.0 - 15.0 %    Platelet Count 249 150 - 450 10e9/L   INR   Result Value Ref Range    INR 1.11 0.86 - 1.14   Partial thromboplastin time   Result Value Ref Range    PTT 44 (H) 22 - 37 sec     *Note: Due to a large number of results and/or encounters for the  requested time period, some results have not been displayed. A complete set of results can be found in Results Review.       Recent Labs   Lab Test  10/12/17   1358  10/07/17   1345  10/05/17   1330   02/13/17   1114  01/08/17   2115   HGB  12.9  13.1  13.5   < >  14.2  14.0   PLT  249  238  243   < >  279  275   INR   --    --    --    --   1.00  0.91   NA   --   140  139   < >  139  146*   POTASSIUM   --   3.9  3.9   < >  4.4  4.1   CR   --   0.79  0.68   < >  0.75  0.74    < > = values in this interval not displayed.        IMPRESSION:                                                    Reason for surgery/procedure: Coccygeal fracture   Diagnosis/reason for consult: Pre operative consult     The proposed surgical procedure is considered INTERMEDIATE risk.    REVISED CARDIAC RISK INDEX  The patient has the following serious cardiovascular risks for perioperative complications such as (MI, PE, VFib and 3  AV Block):  No serious cardiac risks    INTERPRETATION: 0 risks: Class I (very low risk - 0.4% complication rate)    The patient has the following additional risks for perioperative complications:  No identified additional risks      ICD-10-CM    1. Preop general physical exam Z01.818    2. Closed fracture of coccyx with nonunion, subsequent encounter S32.2XXK    3. Chronic bilateral low back pain with left-sided sciatica M54.42     G89.29    4. Lumbar disc disease with radiculopathy M51.16    5. S/P lumbar fusion Z98.1    6. Chronic pain syndrome G89.4    7. Bilateral hearing loss, unspecified hearing loss type H91.93    8. Mild persistent asthma without complication J45.30    9. Crohn's disease of colon with other complication (H) K50.118    10. Mild major depression (H) F32.0    11. Bipolar affective disorder, remission status unspecified (H) F31.9    12. Tobacco abuse Z72.0        RECOMMENDATIONS:                                                    --Patient is to take all scheduled medications on the day of  surgery EXCEPT for modifications listed below.     Patient takes all scheduled medications in the evening. OK to continue.      Nothing by mouth after midnight, may take pain pill if needed with small sip of water     APPROVAL GIVEN to proceed with proposed procedure, without further diagnostic evaluation    Due to language barrier, an  was present during the history-taking and subsequent discussion (and for part of the physical exam) with this patient.         Signed Electronically by: Aura Rosario PA-C    Copy of this evaluation report is provided to requesting physician.    [place] Preop Guidelines

## 2017-10-12 NOTE — NURSING NOTE
"Chief Complaint   Patient presents with     Hospital F/U     Panel Management     flu shot        Initial /74 (BP Location: Right arm, Patient Position: Chair, Cuff Size: Adult Regular)  Pulse 82  Temp 98.9  F (37.2  C) (Oral)  Resp 16  Wt 70.3 kg (155 lb)  SpO2 98%  BMI 28.35 kg/m2 Estimated body mass index is 28.35 kg/(m^2) as calculated from the following:    Height as of 10/5/17: 1.575 m (5' 2\").    Weight as of this encounter: 70.3 kg (155 lb).  Medication Reconciliation: complete  "

## 2017-10-13 NOTE — PROGRESS NOTES
Savana Burns    Your pre-op results were normal. You will be good for surgery.     The results are attached for your review.       Zach Rosario PA-C

## 2017-10-16 ENCOUNTER — TELEPHONE (OUTPATIENT)
Dept: AUDIOLOGY | Facility: OTHER | Age: 27
End: 2017-10-16

## 2017-10-16 DIAGNOSIS — H90.3 SENSORINEURAL HEARING LOSS, BILATERAL: Primary | ICD-10-CM

## 2017-10-16 PROCEDURE — V5266 BATTERY FOR HEARING DEVICE: HCPCS | Performed by: AUDIOLOGIST

## 2017-10-16 PROCEDURE — 99207 ZZC NO CHARGE LOS: CPT | Performed by: AUDIOLOGIST

## 2017-10-16 NOTE — TELEPHONE ENCOUNTER
Left message for patient letting her know that her hearing aids are ready to be picked up in [place]. ERB

## 2017-10-16 NOTE — PROGRESS NOTES
Katy Islas called audiology to request a 3 month supply of 675 batteries. Left batteries at  in [place] for .     CHARGES:   .002 X36 Batteries ($1.00 each)   Total: $36    Please bill to patient's insurance.    Amira Brown  Licensed Audiologist, [place] #[id number]  [facility]  10/16/2017

## 2017-10-17 ENCOUNTER — TELEPHONE (OUTPATIENT)
Dept: FAMILY MEDICINE | Facility: OTHER | Age: 27
End: 2017-10-17

## 2017-10-17 ENCOUNTER — OFFICE VISIT (OUTPATIENT)
Dept: FAMILY MEDICINE | Facility: OTHER | Age: 27
End: 2017-10-17
Payer: MEDICARE

## 2017-10-17 VITALS
TEMPERATURE: 98.4 F | BODY MASS INDEX: 28.17 KG/M2 | OXYGEN SATURATION: 100 % | DIASTOLIC BLOOD PRESSURE: 70 MMHG | SYSTOLIC BLOOD PRESSURE: 114 MMHG | WEIGHT: 154 LBS | RESPIRATION RATE: 22 BRPM | HEART RATE: 124 BPM

## 2017-10-17 DIAGNOSIS — K50.118 CROHN'S DISEASE OF COLON WITH OTHER COMPLICATION (H): ICD-10-CM

## 2017-10-17 DIAGNOSIS — M54.42 CHRONIC BILATERAL LOW BACK PAIN WITH LEFT-SIDED SCIATICA: ICD-10-CM

## 2017-10-17 DIAGNOSIS — Z72.0 TOBACCO ABUSE: ICD-10-CM

## 2017-10-17 DIAGNOSIS — Z01.818 PREOP GENERAL PHYSICAL EXAM: Primary | ICD-10-CM

## 2017-10-17 DIAGNOSIS — G89.29 CHRONIC BILATERAL LOW BACK PAIN WITH LEFT-SIDED SCIATICA: ICD-10-CM

## 2017-10-17 DIAGNOSIS — S32.2XXK CLOSED FRACTURE OF COCCYX WITH NONUNION, SUBSEQUENT ENCOUNTER: ICD-10-CM

## 2017-10-17 DIAGNOSIS — H91.93 BILATERAL HEARING LOSS, UNSPECIFIED HEARING LOSS TYPE: ICD-10-CM

## 2017-10-17 DIAGNOSIS — G89.4 CHRONIC PAIN SYNDROME: ICD-10-CM

## 2017-10-17 DIAGNOSIS — J45.30 MILD PERSISTENT ASTHMA WITHOUT COMPLICATION: ICD-10-CM

## 2017-10-17 DIAGNOSIS — Z98.1 S/P LUMBAR FUSION: ICD-10-CM

## 2017-10-17 DIAGNOSIS — F31.9 BIPOLAR AFFECTIVE DISORDER, REMISSION STATUS UNSPECIFIED (H): ICD-10-CM

## 2017-10-17 DIAGNOSIS — M51.16 LUMBAR DISC DISEASE WITH RADICULOPATHY: ICD-10-CM

## 2017-10-17 DIAGNOSIS — F32.0 MILD MAJOR DEPRESSION (H): ICD-10-CM

## 2017-10-17 PROBLEM — D62 ANEMIA DUE TO BLOOD LOSS, ACUTE: Status: RESOLVED | Noted: 2017-07-01 | Resolved: 2017-10-17

## 2017-10-17 PROCEDURE — T1013 SIGN LANG/ORAL INTERPRETER: HCPCS | Mod: U3 | Performed by: PHYSICIAN ASSISTANT

## 2017-10-17 PROCEDURE — 99214 OFFICE O/P EST MOD 30 MIN: CPT | Performed by: PHYSICIAN ASSISTANT

## 2017-10-17 NOTE — MR AVS SNAPSHOT
After Visit Summary   10/17/2017    Katy Islas    MRN: [id number]           Patient Information     Date Of Birth          1990        Visit Information        Provider Department      10/17/2017 9:00 AM Shira Faustin; Aura Rosario, [facility]        Today's Diagnoses     Preop general physical exam    -  1    Closed fracture of coccyx with nonunion, subsequent encounter        Chronic bilateral low back pain with left-sided sciatica        Lumbar disc disease with radiculopathy        S/P lumbar fusion        Chronic pain syndrome        Bilateral hearing loss, unspecified hearing loss type        Mild persistent asthma without complication        Crohn's disease of colon with other complication (H)        Mild major depression (H)        Bipolar affective disorder, remission status unspecified (H)        Tobacco abuse          Care Instructions      Before Your Surgery      Call your surgeon if there is any change in your health. This includes signs of a cold or flu (such as a sore throat, runny nose, cough, rash or fever).    Do not smoke, drink alcohol or take over the counter medicine (unless your surgeon or primary care doctor tells you to) for the 24 hours before and after surgery.    If you take prescribed drugs: Follow your doctor s orders about which medicines to take and which to stop until after surgery.    Eating and drinking prior to surgery: follow the instructions from your surgeon    Take a shower or bath the night before surgery. Use the soap your surgeon gave you to gently clean your skin. If you do not have soap from your surgeon, use your regular soap. Do not shave or scrub the surgery site.  Wear clean pajamas and have clean sheets on your bed.           Follow-ups after your visit        Who to contact     If you have questions or need follow up information about today's clinic visit or your schedule please contact [facility] directly at [phone].  Normal or non-critical lab and imaging results will be communicated to you by MyChart, letter or phone within 4 business days after the clinic has received the results. If you do not hear from us within 7 days, please contact the clinic through [employer] or phone. If you have a critical or abnormal lab result, we will notify you by phone as soon as possible.  Submit refill requests through [employer] or call your pharmacy and they will forward the refill request to us. Please allow 3 business days for your refill to be completed.          Additional Information About Your Visit        [employer] Information     [employer] gives you secure access to your electronic health record. If you see a primary care provider, you can also send messages to your care team and make appointments. If you have questions, please call your primary care clinic.  If you do not have a primary care provider, please call [phone] and they will assist you.        Care EveryWhere ID     This is your Care EveryWhere ID. This could be used by other organizations to access your [place] medical records  [id number]        Your Vitals Were     Pulse Temperature Respirations Pulse Oximetry BMI (Body Mass Index)       124 98.4  F (36.9  C) (Temporal) 22 100% 28.17 kg/m2        Blood Pressure from Last 3 Encounters:   10/17/17 114/70   10/12/17 114/74   10/07/17 116/74    Weight from Last 3 Encounters:   10/17/17 154 lb (69.9 kg)   10/12/17 155 lb (70.3 kg)   10/07/17 154 lb 5.2 oz (70 kg)              Today, you had the following     No orders found for display       Primary Care Provider Office Phone # Fax #    Aura L LOVE Rosario [phone]       [address]  [facility] [address]        Equal Access to Services     VIKY VILLANUEVA : Ruth Chen, waaxda luqadaha, qaybta kaalmada norbertoyaduarte, susan turner. [facility]  [phone].    ATENCIÓN: Si maggi nathan, tiene a kerr disposición servicios gratuitos de asistencia lingüística. Casper craig [phone].    We comply with applicable federal civil rights laws and [place] laws. We do not discriminate on the basis of race, color, national origin, age, disability, sex, sexual orientation, or gender identity.            Thank you!     Thank you for choosing [facility]  for your care. Our goal is always to provide you with excellent care. Hearing back from our patients is one way we can continue to improve our services. Please take a few minutes to complete the written survey that you may receive in the mail after your visit with us. Thank you!             Your Updated Medication List - Protect others around you: Learn how to safely use, store and throw away your medicines at www.disposemymeds.org.          This list is accurate as of: 10/17/17  9:27 AM.  Always use your most recent med list.                   Brand Name Dispense Instructions for use Diagnosis    * albuterol 108 (90 BASE) MCG/ACT Inhaler    PROAIR HFA/PROVENTIL HFA/VENTOLIN HFA    1 Inhaler    Inhale 2 puffs into the lungs every 6 hours as needed for shortness of breath / dyspnea or wheezing    Mild persistent asthma without complication       * albuterol (2.5 MG/3ML) 0.083% neb solution     50 vial    Take 1 vial (2.5 mg) by nebulization every 6 hours as needed for shortness of breath / dyspnea or wheezing    Mild persistent asthma without complication       ARIPiprazole 5 MG tablet    ABILIFY    30 tablet    Take 1 tablet (5 mg) by mouth At Bedtime    Adjustment disorder with mixed anxiety and depressed mood, Anxiety       * cetirizine 10 MG tablet    zyrTEC     Take 10 mg by mouth daily        * cetirizine 10 MG tablet    zyrTEC    90 tablet    Take 1 tablet (10 mg) by mouth every evening    Chronic seasonal allergic rhinitis due to pollen       cyclobenzaprine 10 MG tablet    FLEXERIL    60 tablet     Take 2 tablets (20 mg) by mouth nightly as needed for muscle spasms or other (back pain)    Chronic bilateral low back pain with left-sided sciatica       dexlansoprazole 60 MG Cpdr CR capsule    DEXILANT    30 capsule    Take 1 capsule (60 mg) by mouth daily    Gastroesophageal reflux disease without esophagitis       diazepam 5 MG tablet    VALIUM    60 tablet    Take 0.5-1 tablets (2.5-5 mg) by mouth every 12 hours as needed for anxiety or sleep (MUSCLE SPASM) (one month supply)    Chronic pain syndrome, Chronic bilateral low back pain with left-sided sciatica, Lumbar disc disease with radiculopathy, S/P lumbar fusion       dicyclomine 20 MG tablet    BENTYL    30 tablet    Take 1 tablet (20 mg) by mouth 4 times daily as needed (ABDOMINAL CRAMPING) AS NEEDED FOR CRAMPING    Hx of Crohn's disease       diphenoxylate-atropine 2.5-0.025 MG per tablet    LOMOTIL    10 tablet    Take 1 tablet by mouth 4 times daily as needed for diarrhea        DULoxetine 60 MG EC capsule    CYMBALTA    30 capsule    Take 1 capsule (60 mg) by mouth daily    Adjustment disorder with mixed anxiety and depressed mood       EPINEPHrine 0.3 MG/0.3ML injection 2-pack    EPIPEN/ADRENACLICK/or ANY BX GENERIC EQUIV    0.6 mL    Inject 0.3 mLs (0.3 mg) into the muscle once as needed for anaphylaxis    Bee sting allergy       FLEX-A-MIN JOINT FLEX PO      Take by mouth daily Reported on 3/28/2017        fluticasone 50 MCG/ACT spray    FLONASE    1 Bottle    Spray 2 sprays into both nostrils daily    Seasonal allergic rhinitis due to pollen       gabapentin 300 MG capsule    NEURONTIN    90 capsule    Take 1 capsule (300 mg) by mouth 3 times daily    Chronic bilateral low back pain with left-sided sciatica       guaiFENesin-codeine 100-10 MG/5ML Soln solution    ROBITUSSIN AC    120 mL    Take 10 mLs by mouth every 6 hours as needed for cough    Cough       lidocaine 5 % Patch    LIDODERM    30 patch    Apply up to 3 patches to painful area at  once for up to 12 h within a 24 h period.  Remove after 12 hours.    Lumbar disc disease with radiculopathy       meclizine 12.5 MG tablet    ANTIVERT    30 tablet    Take 1 tablet (12.5 mg) by mouth 4 times daily as needed for dizziness    Dizziness       medical cannabis inhalation (Patient's own supply.  Not a prescription)      Inhale into the lungs 3 times daily as needed Reported on 3/28/2017        medroxyPROGESTERone 150 MG/ML injection    DEPO-PROVERA    3 mL    Inject 1 mL (150 mg) into the muscle every 3 months    Encounter for surveillance of injectable contraceptive, Dysmenorrhea       Multi-vitamin Tabs tablet   Generic drug:  multivitamin, therapeutic with minerals      Take 1 tablet by mouth daily Reported on 3/28/2017        NONFORMULARY      Apply 30 mLs topically 4 times daily        ondansetron 4 MG ODT tab    ZOFRAN ODT    60 tablet    Take 1-2 tablets (4-8 mg) by mouth every 6 hours as needed for nausea Take 1-2 tablets by mouth as needed for nausea    Nausea       order for DME     1 each    Nebulizer with mask and tubing    Mild persistent asthma without complication       oxyCODONE 5 MG IR tablet    ROXICODONE    56 tablet    Take 1 tablet (5 mg) by mouth every 6 hours as needed for pain maximum 4 tablet(s) per day    Chronic pain syndrome, Chronic bilateral low back pain with left-sided sciatica, S/P lumbar fusion, Lumbar disc disease with radiculopathy       pantoprazole 40 MG EC tablet    PROTONIX    90 tablet    Take 1 tablet (40 mg) by mouth daily Take 30-60 minutes before a meal.    Gastroesophageal reflux disease without esophagitis       * promethazine 25 MG tablet    PHENERGAN    60 tablet    Take 1 tablet (25 mg) by mouth every 6 hours as needed for nausea    Hx of Crohn's disease       * promethazine 6.25 MG/5ML syrup    PHENERGAN    560 mL    Take 10 mLs (12.5 mg) by mouth 4 times daily as needed for nausea        rizatriptan 5 MG ODT tab    MAXALT-MLT    18 tablet    Take 1-2  tablets (5-10 mg) by mouth at onset of headache for migraine May repeat in 2 hours. Max 6 tablets/24 hours.    Migraine with aura and without status migrainosus, not intractable       senna-docusate 8.6-50 MG per tablet    SENOKOT-S;PERICOLACE    60 tablet    Take 1 tablet by mouth 2 times daily as needed for constipation    Drug-induced constipation       sucralfate 1 GM/10ML suspension    CARAFATE    420 mL    Take 10 mLs (1 g) by mouth 4 times daily (before meals and nightly)    Abdominal pain, epigastric, Hematemesis with nausea       * Notice:  This list has 6 medication(s) that are the same as other medications prescribed for you. Read the directions carefully, and ask your doctor or other care provider to review them with you.

## 2017-10-17 NOTE — TELEPHONE ENCOUNTER
Pre op from today complete. Has surgery tomorrow. Please fax ASAP.    Zach Tomlinson-LOVE Moss  [facility]

## 2017-10-17 NOTE — NURSING NOTE
"Chief Complaint   Patient presents with     Pre-Op Exam     Health Maintenance     weight, height, flu       Initial /70 (BP Location: Right arm, Patient Position: Chair, Cuff Size: Adult Large)  Pulse 124  Temp 98.4  F (36.9  C) (Temporal)  Resp 22  Wt 154 lb (69.9 kg)  SpO2 100%  BMI 28.17 kg/m2 Estimated body mass index is 28.17 kg/(m^2) as calculated from the following:    Height as of 10/5/17: 5' 2\" (1.575 m).    Weight as of this encounter: 154 lb (69.9 kg).  Medication Reconciliation: complete   Katy Sheikh CMA      "

## 2017-10-19 ENCOUNTER — TELEPHONE (OUTPATIENT)
Dept: FAMILY MEDICINE | Facility: OTHER | Age: 27
End: 2017-10-19

## 2017-10-19 NOTE — TELEPHONE ENCOUNTER
I spoke with Mom.   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  If pain is severe, she should go to the ED.   Mom states patient is outside raking leaves currently.   She went to bed at 9 pm last night and woke up at 4:30 in horrible pain.   Mom states she is going to try and give her a pain pill in the middle of the night to see if it helps.   Disregard work in request at this time per Mom.     Ines Roy, RN, BSN

## 2017-10-19 NOTE — TELEPHONE ENCOUNTER
Reason for Call:  Same Day Appointment, Requested Provider:  any     PCP: Aura Rosario    Reason for visit: pain after surgery yesterday, leg. Oxycodone not working    Duration of symptoms: since yesterday    Have you been treated for this in the past? No    Additional comments: is wondering if anyone would work her in today in [place]. declined another location.    Can we leave a detailed message on this number? YES    Phone number patient can be reached at: Home number on file [phone] (home)    Best Time: asap    Call taken on 10/19/2017 at 7:42 AM by Kenzie Maria

## 2017-10-19 NOTE — TELEPHONE ENCOUNTER
Pt is calling back. She is very upset that her mother was called. She wants to be worked in with CDL tomorrow. They Oxycodone is not helping at all.  Please contact her at [phone].  Thank you,  Loida Rosado- Pt Rep.

## 2017-10-19 NOTE — TELEPHONE ENCOUNTER
Patient calling again wondering if she could be worked in tomorrow with CDL.   [phone] patrice springer

## 2017-10-19 NOTE — TELEPHONE ENCOUNTER
Routing work in request to CDL to review and advise as her schedule is full. Nohemi Jennings RN, BSN

## 2017-10-20 ENCOUNTER — OFFICE VISIT (OUTPATIENT)
Dept: FAMILY MEDICINE | Facility: OTHER | Age: 27
End: 2017-10-20
Payer: MEDICARE

## 2017-10-20 VITALS
TEMPERATURE: 98.4 F | SYSTOLIC BLOOD PRESSURE: 104 MMHG | OXYGEN SATURATION: 96 % | HEART RATE: 92 BPM | HEIGHT: 62 IN | DIASTOLIC BLOOD PRESSURE: 68 MMHG | BODY MASS INDEX: 29.11 KG/M2 | RESPIRATION RATE: 18 BRPM | WEIGHT: 158.2 LBS

## 2017-10-20 DIAGNOSIS — G89.29 CHRONIC BILATERAL LOW BACK PAIN WITH LEFT-SIDED SCIATICA: Primary | ICD-10-CM

## 2017-10-20 DIAGNOSIS — M54.42 CHRONIC BILATERAL LOW BACK PAIN WITH LEFT-SIDED SCIATICA: Primary | ICD-10-CM

## 2017-10-20 DIAGNOSIS — Z98.1 S/P LUMBAR FUSION: ICD-10-CM

## 2017-10-20 DIAGNOSIS — M51.16 LUMBAR DISC DISEASE WITH RADICULOPATHY: ICD-10-CM

## 2017-10-20 DIAGNOSIS — S32.2XXK CLOSED FRACTURE OF COCCYX WITH NONUNION, SUBSEQUENT ENCOUNTER: ICD-10-CM

## 2017-10-20 PROCEDURE — T1013 SIGN LANG/ORAL INTERPRETER: HCPCS | Mod: U3 | Performed by: PHYSICIAN ASSISTANT

## 2017-10-20 PROCEDURE — 99215 OFFICE O/P EST HI 40 MIN: CPT | Performed by: PHYSICIAN ASSISTANT

## 2017-10-20 RX ORDER — FENTANYL 25 UG/1
1 PATCH TRANSDERMAL
Qty: 4 PATCH | Refills: 0 | Status: SHIPPED | OUTPATIENT
Start: 2017-10-20 | End: 2017-10-31

## 2017-10-20 RX ORDER — METHYLPREDNISOLONE 4 MG
TABLET, DOSE PACK ORAL
Qty: 21 TABLET | Refills: 0 | Status: SHIPPED | OUTPATIENT
Start: 2017-10-20 | End: 2017-10-24

## 2017-10-20 ASSESSMENT — PAIN SCALES - GENERAL: PAINLEVEL: SEVERE PAIN (6)

## 2017-10-20 NOTE — NURSING NOTE
"Chief Complaint   Patient presents with     Surgical Followup     post surgical pain     Panel Management     flu       Initial /68  Pulse 92  Temp 98.4  F (36.9  C) (Temporal)  Resp 18  Ht 5' 2\" (1.575 m)  Wt 158 lb 3.2 oz (71.8 kg)  SpO2 96%  BMI 28.94 kg/m2 Estimated body mass index is 28.94 kg/(m^2) as calculated from the following:    Height as of this encounter: 5' 2\" (1.575 m).    Weight as of this encounter: 158 lb 3.2 oz (71.8 kg).  Medication Reconciliation: complete   April MARY Cisneros      "

## 2017-10-20 NOTE — PATIENT INSTRUCTIONS
- Start Medrol Dose pack     - Start Fentanyl patch, replace every 3 days      Oxycodone for break through pain (try to take less than 4 per day)       - Recheck 2 weeks

## 2017-10-20 NOTE — TELEPHONE ENCOUNTER
Patient was persistent on being scheduled with CDL. She refused to follow up with surgeon since it was too far away. Per CDL added as work in at 10a today. I mentioned intepreter and patient stated she is signing off to have her mom come to interpret.

## 2017-10-20 NOTE — TELEPHONE ENCOUNTER
Please advise patient to contact her surgeon first for recommendations since this is post surgical pain.     If she is expecting something immediate, then I would advise the ED for IV medication. Sometimes something like that will help as it can help us get ahead of the pain.      However, if she still insists on being seen, have her come around 10 am. We will also need to see if we can get an interpretor.       Zach Rosario PA-C  [facility]

## 2017-10-20 NOTE — MR AVS SNAPSHOT
After Visit Summary   10/20/2017    Katy Islas    MRN: [id number]           Patient Information     Date Of Birth          1990        Visit Information        Provider Department      10/20/2017 10:00 AM Keron Srinivasan; Aura Rosario, [facility]        Today's Diagnoses     Chronic bilateral low back pain with left-sided sciatica    -  1    Lumbar disc disease with radiculopathy        Closed fracture of coccyx with nonunion, subsequent encounter        S/P lumbar fusion          Care Instructions    - Start Medrol Dose pack     - Start Fentanyl patch, replace every 3 days      Oxycodone for break through pain (try to take less than 4 per day)       - Recheck 2 weeks           Follow-ups after your visit        Who to contact     If you have questions or need follow up information about today's clinic visit or your schedule please contact [facility] directly at [phone].  Normal or non-critical lab and imaging results will be communicated to you by MyChart, letter or phone within 4 business days after the clinic has received the results. If you do not hear from us within 7 days, please contact the clinic through [employer] or phone. If you have a critical or abnormal lab result, we will notify you by phone as soon as possible.  Submit refill requests through [employer] or call your pharmacy and they will forward the refill request to us. Please allow 3 business days for your refill to be completed.          Additional Information About Your Visit        MyChart Information     [employer] gives you secure access to your electronic health record. If you see a primary care provider, you can also send messages to your care team and make appointments. If you have questions, please call your primary care clinic.  If you do not have a primary care provider, please call [phone] and they will assist you.        Care EveryWhere ID     This is your  "Care EveryWhere ID. This could be used by other organizations to access your [place] medical records  [id number]        Your Vitals Were     Pulse Temperature Respirations Height Pulse Oximetry BMI (Body Mass Index)    92 98.4  F (36.9  C) (Temporal) 18 5' 2\" (1.575 m) 96% 28.94 kg/m2       Blood Pressure from Last 3 Encounters:   10/20/17 104/68   10/17/17 114/70   10/12/17 114/74    Weight from Last 3 Encounters:   10/20/17 158 lb 3.2 oz (71.8 kg)   10/17/17 154 lb (69.9 kg)   10/12/17 155 lb (70.3 kg)              Today, you had the following     No orders found for display         Today's Medication Changes          These changes are accurate as of: 10/20/17 10:42 AM.  If you have any questions, ask your nurse or doctor.               Start taking these medicines.        Dose/Directions    fentaNYL 25 mcg/hr 72 hr patch   Commonly known as:  DURAGESIC   Used for:  Chronic bilateral low back pain with left-sided sciatica, Lumbar disc disease with radiculopathy, Closed fracture of coccyx with nonunion, subsequent encounter, S/P lumbar fusion   Started by:  Aura Rosario PA-C        Dose:  1 patch   Place 1 patch onto the skin every 72 hours   Quantity:  4 patch   Refills:  0       methylPREDNISolone 4 MG tablet   Commonly known as:  MEDROL DOSEPAK   Used for:  Chronic bilateral low back pain with left-sided sciatica, Lumbar disc disease with radiculopathy, Closed fracture of coccyx with nonunion, subsequent encounter, S/P lumbar fusion   Started by:  Aura Rosario PA-C        Follow package instructions   Quantity:  21 tablet   Refills:  0            Where to get your medicines      These medications were sent to [facility] [address] [place], [place] - [address] [address] AT [facility] [address]  [address] [address], [facility] [address]     Phone:  [phone]     methylPREDNISolone 4 MG tablet         Some of these will need a paper prescription and others can be " bought over the counter.  Ask your nurse if you have questions.     Bring a paper prescription for each of these medications     fentaNYL 25 mcg/hr 72 hr patch                Primary Care Provider Office Phone # Fax #    Aura EMILY Rosario PA-C [phone]       [address] [place] [address] [address]  [facility] [address]        Equal Access to Services     [facility] : Hadii aad ku hadasho Soomaali, waaxda luqadaha, qaybta kaalmada adeegyada, waxay idiin hayaan adeeg kharalakshmi laadri . So [facility] [phone].    ATENCIÓN: Si habla español, tiene a kerr disposición servicios gratuitos de asistencia lingüística. Ruyame al [phone].    We comply with applicable federal civil rights laws and [place] laws. We do not discriminate on the basis of race, color, national origin, age, disability, sex, sexual orientation, or gender identity.            Thank you!     Thank you for choosing [facility]  for your care. Our goal is always to provide you with excellent care. Hearing back from our patients is one way we can continue to improve our services. Please take a few minutes to complete the written survey that you may receive in the mail after your visit with us. Thank you!             Your Updated Medication List - Protect others around you: Learn how to safely use, store and throw away your medicines at www.disposemymeds.org.          This list is accurate as of: 10/20/17 10:42 AM.  Always use your most recent med list.                   Brand Name Dispense Instructions for use Diagnosis    * albuterol 108 (90 BASE) MCG/ACT Inhaler    PROAIR HFA/PROVENTIL HFA/VENTOLIN HFA    1 Inhaler    Inhale 2 puffs into the lungs every 6 hours as needed for shortness of breath / dyspnea or wheezing    Mild persistent asthma without complication       * albuterol (2.5 MG/3ML) 0.083% neb solution     50 vial    Take 1 vial (2.5 mg) by nebulization every 6 hours as needed for shortness of breath / dyspnea or  wheezing    Mild persistent asthma without complication       ARIPiprazole 5 MG tablet    ABILIFY    30 tablet    Take 1 tablet (5 mg) by mouth At Bedtime    Adjustment disorder with mixed anxiety and depressed mood, Anxiety       * cetirizine 10 MG tablet    zyrTEC     Take 10 mg by mouth daily        * cetirizine 10 MG tablet    zyrTEC    90 tablet    Take 1 tablet (10 mg) by mouth every evening    Chronic seasonal allergic rhinitis due to pollen       cyclobenzaprine 10 MG tablet    FLEXERIL    60 tablet    Take 2 tablets (20 mg) by mouth nightly as needed for muscle spasms or other (back pain)    Chronic bilateral low back pain with left-sided sciatica       dexlansoprazole 60 MG Cpdr CR capsule    DEXILANT    30 capsule    Take 1 capsule (60 mg) by mouth daily    Gastroesophageal reflux disease without esophagitis       diazepam 5 MG tablet    VALIUM    60 tablet    Take 0.5-1 tablets (2.5-5 mg) by mouth every 12 hours as needed for anxiety or sleep (MUSCLE SPASM) (one month supply)    Chronic pain syndrome, Chronic bilateral low back pain with left-sided sciatica, Lumbar disc disease with radiculopathy, S/P lumbar fusion       dicyclomine 20 MG tablet    BENTYL    30 tablet    Take 1 tablet (20 mg) by mouth 4 times daily as needed (ABDOMINAL CRAMPING) AS NEEDED FOR CRAMPING    Hx of Crohn's disease       diphenoxylate-atropine 2.5-0.025 MG per tablet    LOMOTIL    10 tablet    Take 1 tablet by mouth 4 times daily as needed for diarrhea        DULoxetine 60 MG EC capsule    CYMBALTA    30 capsule    Take 1 capsule (60 mg) by mouth daily    Adjustment disorder with mixed anxiety and depressed mood       EPINEPHrine 0.3 MG/0.3ML injection 2-pack    EPIPEN/ADRENACLICK/or ANY BX GENERIC EQUIV    0.6 mL    Inject 0.3 mLs (0.3 mg) into the muscle once as needed for anaphylaxis    Bee sting allergy       fentaNYL 25 mcg/hr 72 hr patch    DURAGESIC    4 patch    Place 1 patch onto the skin every 72 hours    Chronic  bilateral low back pain with left-sided sciatica, Lumbar disc disease with radiculopathy, Closed fracture of coccyx with nonunion, subsequent encounter, S/P lumbar fusion       FLEX-A-MIN JOINT FLEX PO      Take by mouth daily Reported on 3/28/2017        fluticasone 50 MCG/ACT spray    FLONASE    1 Bottle    Spray 2 sprays into both nostrils daily    Seasonal allergic rhinitis due to pollen       gabapentin 300 MG capsule    NEURONTIN    90 capsule    Take 1 capsule (300 mg) by mouth 3 times daily    Chronic bilateral low back pain with left-sided sciatica       meclizine 12.5 MG tablet    ANTIVERT    30 tablet    Take 1 tablet (12.5 mg) by mouth 4 times daily as needed for dizziness    Dizziness       medical cannabis inhalation (Patient's own supply.  Not a prescription)      Inhale into the lungs 3 times daily as needed Reported on 3/28/2017        medroxyPROGESTERone 150 MG/ML injection    DEPO-PROVERA    3 mL    Inject 1 mL (150 mg) into the muscle every 3 months    Encounter for surveillance of injectable contraceptive, Dysmenorrhea       methylPREDNISolone 4 MG tablet    MEDROL DOSEPAK    21 tablet    Follow package instructions    Chronic bilateral low back pain with left-sided sciatica, Lumbar disc disease with radiculopathy, Closed fracture of coccyx with nonunion, subsequent encounter, S/P lumbar fusion       Multi-vitamin Tabs tablet   Generic drug:  multivitamin, therapeutic with minerals      Take 1 tablet by mouth daily Reported on 3/28/2017        NONFORMULARY      Apply 30 mLs topically 4 times daily        ondansetron 4 MG ODT tab    ZOFRAN ODT    60 tablet    Take 1-2 tablets (4-8 mg) by mouth every 6 hours as needed for nausea Take 1-2 tablets by mouth as needed for nausea    Nausea       order for DME     1 each    Nebulizer with mask and tubing    Mild persistent asthma without complication       oxyCODONE 5 MG IR tablet    ROXICODONE    56 tablet    Take 1 tablet (5 mg) by mouth every 6 hours  as needed for pain maximum 4 tablet(s) per day    Chronic pain syndrome, Chronic bilateral low back pain with left-sided sciatica, S/P lumbar fusion, Lumbar disc disease with radiculopathy       pantoprazole 40 MG EC tablet    PROTONIX    90 tablet    Take 1 tablet (40 mg) by mouth daily Take 30-60 minutes before a meal.    Gastroesophageal reflux disease without esophagitis       * promethazine 25 MG tablet    PHENERGAN    60 tablet    Take 1 tablet (25 mg) by mouth every 6 hours as needed for nausea    Hx of Crohn's disease       * promethazine 6.25 MG/5ML syrup    PHENERGAN    560 mL    Take 10 mLs (12.5 mg) by mouth 4 times daily as needed for nausea        rizatriptan 5 MG ODT tab    MAXALT-MLT    18 tablet    Take 1-2 tablets (5-10 mg) by mouth at onset of headache for migraine May repeat in 2 hours. Max 6 tablets/24 hours.    Migraine with aura and without status migrainosus, not intractable       senna-docusate 8.6-50 MG per tablet    SENOKOT-S;PERICOLACE    60 tablet    Take 1 tablet by mouth 2 times daily as needed for constipation    Drug-induced constipation       sucralfate 1 GM/10ML suspension    CARAFATE    420 mL    Take 10 mLs (1 g) by mouth 4 times daily (before meals and nightly)    Abdominal pain, epigastric, Hematemesis with nausea       * Notice:  This list has 6 medication(s) that are the same as other medications prescribed for you. Read the directions carefully, and ask your doctor or other care provider to review them with you.

## 2017-10-20 NOTE — PROGRESS NOTES
SUBJECTIVE:                                                    Katy Islas is a 27 year old female who presents to clinic today for the following health issues:      Back Pain Follow Up      Description:   Location of pain:  Left, lower, tail bone  Character of pain: dull ache, worse when sitting and getting out of bed has been a challenge and constant  Pain radiation: Does not radiate and left side, down buttocks and left leg  Since last visit, pain is:  worsened  New numbness or weakness in legs, not attributed to pain:  no     Intensity: At its worst 10/10, yesterday morning was a 10    History:   Pain interferes with job: Not applicable, pain interferes with chores, not able to do daily activities, been resting alot  Therapies tried without relief: None  Therapies tried with relief: walking, cold and standing    Patient would like a stronger pain meds, oxy is not working    - Oxycodone 5 mg, 1 tablet every 4 hours, given by surgeon   - Can't sit to drive   - Surgery was on the 18th   - Doesn't feel things are getting any better   - Follow up with surgeon in 2 weeks, needs to schedule   - Waking up in pain   - Sitting on pillows, donut pillow      Icing 15 min every hour     No hot bath yet, can't soak incision      Gets very little relief after taking Oxycodone, couple hours at most   - Mom and patient were talking about going back on the patch      25 mg with oxycodone      Is there a 50 mg so I don't have to keep taking medication?      What for break through?   - the Duragesic brand only, allergic to others   - State would really like medrol dose pack because it works even better than the pain medication in her opinion     Problem list and histories reviewed & adjusted, as indicated.  Additional history: as documented    ROS:  Constitutional, HEENT, cardiovascular, pulmonary, gi and gu systems are negative, except as otherwise noted.      OBJECTIVE:   /68  Pulse 92  Temp 98.4  F (36.9  C)  "(Temporal)  Resp 18  Ht 5' 2\" (1.575 m)  Wt 158 lb 3.2 oz (71.8 kg)  SpO2 96%  BMI 28.94 kg/m2  Body mass index is 28.94 kg/(m^2).  GENERAL APPEARANCE: healthy, alert and no distress  EYES: Eyes grossly normal to inspection, PERRLA, conjunctivae and sclerae without injection or discharge, EOM intact   MS: No musculoskeletal defects are noted and gait is age appropriate without ataxia   SKIN: No suspicious lesions or rashes, hydration status appears adeuqate with normal skin turgor   NEURO: Strength 5+ bilateral upper and lower extremities, sensation intact in distal bilateral upper and lower extremities, mentation- intact, speech- normal, reflexes- symmetric in bilateral upper and lower extremities, cranial nerves II-XII tested and are intact   BACK: Surgical bandage in place, did not remove, decrease ROM with all movements (very painful for patient), slight edema around surgical site but no warmth and moderate tenderness, bilateral paraspinal tenderness with spasms   PSYCH: Alert and oriented x3; speech- coherent , normal rate and volume; able to articulate logical thoughts, able to abstract reason, no tangential thoughts, no hallucinations or delusions, mentation appears normal, Mood is euthymic. Affect is appropriate for this mood state and bright. Thought content is free of suicidal ideation, hallucinations, and delusions. Dress is adequate and upkept. Eye contact is good during conversation.       Diagnostic Test Results:  none     ASSESSMENT/PLAN:       ICD-10-CM    1. Chronic bilateral low back pain with left-sided sciatica M54.42 fentaNYL (DURAGESIC) 25 mcg/hr 72 hr patch    G89.29 methylPREDNISolone (MEDROL DOSEPAK) 4 MG tablet   2. Lumbar disc disease with radiculopathy M51.16 fentaNYL (DURAGESIC) 25 mcg/hr 72 hr patch     methylPREDNISolone (MEDROL DOSEPAK) 4 MG tablet   3. Closed fracture of coccyx with nonunion, subsequent encounter S32.2XXK fentaNYL (DURAGESIC) 25 mcg/hr 72 hr patch     " methylPREDNISolone (MEDROL DOSEPAK) 4 MG tablet   4. S/P lumbar fusion Z98.1 fentaNYL (DURAGESIC) 25 mcg/hr 72 hr patch     methylPREDNISolone (MEDROL DOSEPAK) 4 MG tablet     - Patient is s/p coccygeal resection on 10/18/17 (two days ago), and s/p multi-level lumbar fusion on 2/24/17, [facility], but is far away, presenting today because too painful to drive to get rx from surgeon and pain very uncontrolled   - Patient is well known to me, in the past has had drug seeking behavior but this was in her teen years and hasn't been seen with me, however, patient has had chronic pain for awhile since before and after her surgery last Feb, patient is very opioid tolerant and has lots of medication intolerances/allergies   - Did discuss usually surgeon cares for pain for 6 weeks after surgery     - Per surgeon instructions that I received for patient's pre-op, no NSAIDs for 6 months after surgery, thus limiting our options in controlling her pain   - Spoke to Dr. Millan's nurse at [facility] at 10:30 am      OK to have NSAIDs after 1 month, but would be ok to have Toradol injection, just nothing oral, would prefer she wait at long as possible before adding these in (optimally 4-6 months but would be ok after 1 if absolutely necessary         OK to have medrol dose pack        Also ok'd increase in pain medications per my medical decision making       Did advise that patient needs to call and schedule her post-op check        Did state pain and healing expected to take 2-4 weeks         -  reviewed      Oxycodone 5 mg given 10/18/17, #60 by her surgeon Dr. Millan       So patient shouldn't need refill of this advise <4 per day          - Reviewed her chart, was on Fentanyl patches after her last surgery and is requesting these today      Discussed I did not want to go higher than 25 mcg, discussed that this surgery is quite small compared to her fusion last Feb.     Plan  as follows       - Call to get appointment with Dr. Millan for routine recheck after surgery       - Continue with ice       - Start medrol dose pack, discussed use and side effects      - Start Fentanyl patch, reviewed use and side effects         Use Oxycodone 5 mg for break through pain, reviewed use and side effects           Advised to watch for signs of sedation and excess opioid use         - Will follow every 2 weeks, discussed I expect pain and medication use to decrease each time I see her    The patient indicates understanding of these issues and agrees with the plan.    Follow up: 2 weeks     Due to language barrier, an  was present during the history-taking and subsequent discussion (and for part of the physical exam) with this patient.    A total of 50 minutes was spent with the patient today, with greater than 50% of the visit involving counseling and coordination of care.        Aura Rosario PA-C  [facility]

## 2017-10-22 ENCOUNTER — MYC MEDICAL ADVICE (OUTPATIENT)
Dept: FAMILY MEDICINE | Facility: OTHER | Age: 27
End: 2017-10-22

## 2017-10-23 ENCOUNTER — APPOINTMENT (OUTPATIENT)
Dept: GENERAL RADIOLOGY | Facility: CLINIC | Age: 27
End: 2017-10-23
Attending: NURSE PRACTITIONER
Payer: MEDICARE

## 2017-10-23 ENCOUNTER — HOSPITAL ENCOUNTER (EMERGENCY)
Facility: CLINIC | Age: 27
Discharge: HOME | End: 2017-10-23
Attending: NURSE PRACTITIONER | Admitting: NURSE PRACTITIONER
Payer: MEDICARE

## 2017-10-23 VITALS
SYSTOLIC BLOOD PRESSURE: 122 MMHG | BODY MASS INDEX: 27.44 KG/M2 | OXYGEN SATURATION: 95 % | TEMPERATURE: 98.5 F | WEIGHT: 150 LBS | RESPIRATION RATE: 18 BRPM | DIASTOLIC BLOOD PRESSURE: 75 MMHG

## 2017-10-23 DIAGNOSIS — K59.03 DRUG-INDUCED CONSTIPATION: ICD-10-CM

## 2017-10-23 LAB
ALBUMIN SERPL-MCNC: 3.4 G/DL (ref 3.4–5)
ALP SERPL-CCNC: 85 U/L (ref 40–150)
ALT SERPL W P-5'-P-CCNC: 25 U/L (ref 0–50)
ANION GAP SERPL CALCULATED.3IONS-SCNC: 7 MMOL/L (ref 3–14)
AST SERPL W P-5'-P-CCNC: 23 U/L (ref 0–45)
BASOPHILS # BLD AUTO: 0 10E9/L (ref 0–0.2)
BASOPHILS NFR BLD AUTO: 0.1 %
BILIRUB SERPL-MCNC: 0.2 MG/DL (ref 0.2–1.3)
BUN SERPL-MCNC: 12 MG/DL (ref 7–30)
CALCIUM SERPL-MCNC: 9 MG/DL (ref 8.5–10.1)
CHLORIDE SERPL-SCNC: 106 MMOL/L (ref 94–109)
CO2 SERPL-SCNC: 29 MMOL/L (ref 20–32)
CREAT SERPL-MCNC: 0.71 MG/DL (ref 0.52–1.04)
DIFFERENTIAL METHOD BLD: ABNORMAL
EOSINOPHIL # BLD AUTO: 0.3 10E9/L (ref 0–0.7)
EOSINOPHIL NFR BLD AUTO: 3.7 %
ERYTHROCYTE [DISTWIDTH] IN BLOOD BY AUTOMATED COUNT: 12.3 % (ref 10–15)
GFR SERPL CREATININE-BSD FRML MDRD: >90 ML/MIN/1.7M2
GLUCOSE SERPL-MCNC: 70 MG/DL (ref 70–99)
HCT VFR BLD AUTO: 33 % (ref 35–47)
HGB BLD-MCNC: 11.4 G/DL (ref 11.7–15.7)
LYMPHOCYTES # BLD AUTO: 1.9 10E9/L (ref 0.8–5.3)
LYMPHOCYTES NFR BLD AUTO: 27.7 %
MCH RBC QN AUTO: 33.9 PG (ref 26.5–33)
MCHC RBC AUTO-ENTMCNC: 34.5 G/DL (ref 31.5–36.5)
MCV RBC AUTO: 98 FL (ref 78–100)
MONOCYTES # BLD AUTO: 0.6 10E9/L (ref 0–1.3)
MONOCYTES NFR BLD AUTO: 8.3 %
NEUTROPHILS # BLD AUTO: 4.1 10E9/L (ref 1.6–8.3)
NEUTROPHILS NFR BLD AUTO: 60.2 %
PLATELET # BLD AUTO: 243 10E9/L (ref 150–450)
POTASSIUM SERPL-SCNC: 3.9 MMOL/L (ref 3.4–5.3)
PROT SERPL-MCNC: 6.7 G/DL (ref 6.8–8.8)
RBC # BLD AUTO: 3.36 10E12/L (ref 3.8–5.2)
SODIUM SERPL-SCNC: 142 MMOL/L (ref 133–144)
WBC # BLD AUTO: 6.8 10E9/L (ref 4–11)

## 2017-10-23 PROCEDURE — 96374 THER/PROPH/DIAG INJ IV PUSH: CPT | Performed by: NURSE PRACTITIONER

## 2017-10-23 PROCEDURE — 96361 HYDRATE IV INFUSION ADD-ON: CPT | Performed by: NURSE PRACTITIONER

## 2017-10-23 PROCEDURE — 96376 TX/PRO/DX INJ SAME DRUG ADON: CPT | Performed by: NURSE PRACTITIONER

## 2017-10-23 PROCEDURE — 96375 TX/PRO/DX INJ NEW DRUG ADDON: CPT | Performed by: NURSE PRACTITIONER

## 2017-10-23 PROCEDURE — 85025 COMPLETE CBC W/AUTO DIFF WBC: CPT | Performed by: NURSE PRACTITIONER

## 2017-10-23 PROCEDURE — 99285 EMERGENCY DEPT VISIT HI MDM: CPT | Mod: 25 | Performed by: NURSE PRACTITIONER

## 2017-10-23 PROCEDURE — 99285 EMERGENCY DEPT VISIT HI MDM: CPT | Mod: Z6 | Performed by: NURSE PRACTITIONER

## 2017-10-23 PROCEDURE — 74020 XR ABDOMEN 2 VW: CPT | Mod: TC

## 2017-10-23 PROCEDURE — 80053 COMPREHEN METABOLIC PANEL: CPT | Performed by: NURSE PRACTITIONER

## 2017-10-23 PROCEDURE — 25000128 H RX IP 250 OP 636: Performed by: NURSE PRACTITIONER

## 2017-10-23 RX ORDER — HYDROMORPHONE HYDROCHLORIDE 1 MG/ML
0.5 INJECTION, SOLUTION INTRAMUSCULAR; INTRAVENOUS; SUBCUTANEOUS
Status: DISCONTINUED | OUTPATIENT
Start: 2017-10-23 | End: 2017-10-23 | Stop reason: HOSPADM

## 2017-10-23 RX ORDER — HYDROMORPHONE HYDROCHLORIDE 1 MG/ML
1 INJECTION, SOLUTION INTRAMUSCULAR; INTRAVENOUS; SUBCUTANEOUS
Status: COMPLETED | OUTPATIENT
Start: 2017-10-23 | End: 2017-10-23

## 2017-10-23 RX ORDER — DIPHENHYDRAMINE HYDROCHLORIDE 50 MG/ML
25 INJECTION INTRAMUSCULAR; INTRAVENOUS ONCE
Status: COMPLETED | OUTPATIENT
Start: 2017-10-23 | End: 2017-10-23

## 2017-10-23 RX ADMIN — SODIUM CHLORIDE 1000 ML: 9 INJECTION, SOLUTION INTRAVENOUS at 16:22

## 2017-10-23 RX ADMIN — HYDROMORPHONE HYDROCHLORIDE 1 MG: 1 INJECTION, SOLUTION INTRAMUSCULAR; INTRAVENOUS; SUBCUTANEOUS at 17:47

## 2017-10-23 RX ADMIN — DIPHENHYDRAMINE HYDROCHLORIDE 25 MG: 50 INJECTION, SOLUTION INTRAMUSCULAR; INTRAVENOUS at 17:05

## 2017-10-23 RX ADMIN — HYDROMORPHONE HYDROCHLORIDE 0.5 MG: 1 INJECTION, SOLUTION INTRAMUSCULAR; INTRAVENOUS; SUBCUTANEOUS at 16:45

## 2017-10-23 RX ADMIN — HYDROMORPHONE HYDROCHLORIDE 0.5 MG: 1 INJECTION, SOLUTION INTRAMUSCULAR; INTRAVENOUS; SUBCUTANEOUS at 16:22

## 2017-10-23 ASSESSMENT — ENCOUNTER SYMPTOMS
APPETITE CHANGE: 0
CONSTIPATION: 1
BACK PAIN: 1

## 2017-10-23 NOTE — ED AVS SNAPSHOT
[facility] Emergency Department    [address] [place] [address]    Phone:  [phone]    Fax:  [phone]                                       Katy Islas   MRN: [id number]    Department:  [facility] Emergency Department   Date of Visit:  10/23/2017           Patient Information     Date Of Birth          1990        Your diagnoses for this visit were:     Drug-induced constipation        You were seen by Flori Del Toro APRN CNP.      Follow-up Information     Follow up with Aura Rosario PA-C In 1 day.    Specialty:  Physician Assistant - Medical    Contact information:    [address]  [facility] [address]  [phone]          Discharge Instructions         Treating Constipation    Constipation is a common and often uncomfortable problem. Constipation means you have bowel movements fewer than 3 times per week, or strain to pass hard, dry stool. It can last a short time. Or it can be a problem that never seems to go away. The good news is that it can often be treated and controlled.  Eat more fiber  One of the best ways to help treat constipation is to increase your fiber intake. You can do this either through diet or by using fiber supplements. Fiber (in whole grains, fruits, and vegetables) adds bulk and absorbs water to soften the stool. This helps the stool pass through the colon more easily. When you increase your fiber intake, do it slowly to avoid side effects such as bloating. Also increase the amount of water that you drink. Eating more of the following foods can add fiber to your diet.    High-fiber cereals    Whole grains, bran, and brown rice    Vegetables such as carrots, broccoli, and greens    Fresh fruits (especially apples, pears, and dried fruits like raisins and apricots)    Nuts and legumes (especially beans such as lentils, kidney beans, and lima beans)  Get physically active  Exercise helps improve the working of  your colon which helps ease constipation. Try to get some physical activity every day. If you haven t been active for a while, talk to your healthcare provider before starting again.  Laxatives  Your healthcare provider may suggest an over-the-counter product to help ease your constipation. He or she may suggest the use of bulk-forming agents or laxatives. The use of laxatives, if used as directed, is common and safe. Follow directions carefully when using them. See your healthcare provider for new-onset constipation, or long-term constipation, to rule out other causes such as medicines or thyroid disease.  Date Last Reviewed: 7/1/2016 2000-2017 [employer]. [address] [place] [address], [place] [address]. All rights reserved. This information is not intended as a substitute for professional medical care. Always follow your healthcare professional's instructions.      Katy, as discussed, I want you to take your Miralax when you get home, then daily until you are done taking the narcotics for pain.  Continue taking the Senna as well.  Make sure you are drinking plenty of water, you can also add prunes/prune juice to your diet which will also help you have a bowel movement.   Good luck with your recovery.  Take care.     Future Appointments        Provider Department Dept Phone Center    10/24/2017 2:00 PM [facility] Open; Aura Rosario PA-C; CLAUDIA IS; [facility] [phone] [facility]      24 Hour Appointment Hotline       To make an appointment at any [facility], call [phone] ([phone]). If you don't have a family doctor or clinic, we will help you find one. [facility] are conveniently located to serve the needs of you and your family.             Review of your medicines      Our records show that you are taking the medicines listed below. If these are incorrect, please call your family doctor or clinic.        Dose / Directions Last  dose taken    * albuterol 108 (90 BASE) MCG/ACT Inhaler   Commonly known as:  PROAIR HFA/PROVENTIL HFA/VENTOLIN HFA   Dose:  2 puff   Quantity:  1 Inhaler        Inhale 2 puffs into the lungs every 6 hours as needed for shortness of breath / dyspnea or wheezing   Refills:  1        * albuterol (2.5 MG/3ML) 0.083% neb solution   Dose:  1 vial   Quantity:  50 vial        Take 1 vial (2.5 mg) by nebulization every 6 hours as needed for shortness of breath / dyspnea or wheezing   Refills:  1        ARIPiprazole 5 MG tablet   Commonly known as:  ABILIFY   Dose:  5 mg   Quantity:  30 tablet        Take 1 tablet (5 mg) by mouth At Bedtime   Refills:  0        * cetirizine 10 MG tablet   Commonly known as:  zyrTEC   Dose:  10 mg        Take 10 mg by mouth daily   Refills:  0        * cetirizine 10 MG tablet   Commonly known as:  zyrTEC   Dose:  10 mg   Quantity:  90 tablet        Take 1 tablet (10 mg) by mouth every evening   Refills:  3        cyclobenzaprine 10 MG tablet   Commonly known as:  FLEXERIL   Dose:  20 mg   Quantity:  60 tablet        Take 2 tablets (20 mg) by mouth nightly as needed for muscle spasms or other (back pain)   Refills:  3        dexlansoprazole 60 MG Cpdr CR capsule   Commonly known as:  DEXILANT   Dose:  60 mg   Quantity:  30 capsule        Take 1 capsule (60 mg) by mouth daily   Refills:  3        diazepam 5 MG tablet   Commonly known as:  VALIUM   Dose:  2.5-5 mg   Quantity:  60 tablet        Take 0.5-1 tablets (2.5-5 mg) by mouth every 12 hours as needed for anxiety or sleep (MUSCLE SPASM) (one month supply)   Refills:  3        dicyclomine 20 MG tablet   Commonly known as:  BENTYL   Dose:  20 mg   Quantity:  30 tablet        Take 1 tablet (20 mg) by mouth 4 times daily as needed (ABDOMINAL CRAMPING) AS NEEDED FOR CRAMPING   Refills:  3        diphenoxylate-atropine 2.5-0.025 MG per tablet   Commonly known as:  LOMOTIL   Dose:  1 tablet   Quantity:  10 tablet        Take 1 tablet by mouth 4  times daily as needed for diarrhea   Refills:  0        DULoxetine 60 MG EC capsule   Commonly known as:  CYMBALTA   Dose:  60 mg   Quantity:  30 capsule        Take 1 capsule (60 mg) by mouth daily   Refills:  3        EPINEPHrine 0.3 MG/0.3ML injection 2-pack   Commonly known as:  EPIPEN/ADRENACLICK/or ANY BX GENERIC EQUIV   Dose:  0.3 mg   Quantity:  0.6 mL        Inject 0.3 mLs (0.3 mg) into the muscle once as needed for anaphylaxis   Refills:  1        fentaNYL 25 mcg/hr 72 hr patch   Commonly known as:  DURAGESIC   Dose:  1 patch   Quantity:  4 patch        Place 1 patch onto the skin every 72 hours   Refills:  0        FLEX-A-MIN JOINT FLEX PO        Take by mouth daily Reported on 3/28/2017   Refills:  0        fluticasone 50 MCG/ACT spray   Commonly known as:  FLONASE   Dose:  2 spray   Quantity:  1 Bottle        Spray 2 sprays into both nostrils daily   Refills:  3        gabapentin 300 MG capsule   Commonly known as:  NEURONTIN   Dose:  300 mg   Quantity:  90 capsule        Take 1 capsule (300 mg) by mouth 3 times daily   Refills:  3        meclizine 12.5 MG tablet   Commonly known as:  ANTIVERT   Dose:  12.5 mg   Quantity:  30 tablet        Take 1 tablet (12.5 mg) by mouth 4 times daily as needed for dizziness   Refills:  3        medical cannabis inhalation (Patient's own supply.  Not a prescription)        Inhale into the lungs 3 times daily as needed Reported on 3/28/2017   Refills:  0        medroxyPROGESTERone 150 MG/ML injection   Commonly known as:  DEPO-PROVERA   Dose:  150 mg   Quantity:  3 mL        Inject 1 mL (150 mg) into the muscle every 3 months   Refills:  3        methylPREDNISolone 4 MG tablet   Commonly known as:  MEDROL DOSEPAK   Quantity:  21 tablet        Follow package instructions   Refills:  0        Multi-vitamin Tabs tablet   Dose:  1 tablet   Generic drug:  multivitamin, therapeutic with minerals        Take 1 tablet by mouth daily Reported on 3/28/2017   Refills:  0         NONFORMULARY   Dose:  30 mL   Indication:  Marina Topical Gel Cream, 1 to 4 pumps  to affect area 4 times daily        Apply 30 mLs topically 4 times daily   Refills:  0        ondansetron 4 MG ODT tab   Commonly known as:  ZOFRAN ODT   Dose:  4-8 mg   Quantity:  60 tablet        Take 1-2 tablets (4-8 mg) by mouth every 6 hours as needed for nausea Take 1-2 tablets by mouth as needed for nausea   Refills:  3        order for DME   Quantity:  1 each        Nebulizer with mask and tubing   Refills:  0        oxyCODONE 5 MG IR tablet   Commonly known as:  ROXICODONE   Dose:  5 mg   Quantity:  56 tablet        Take 1 tablet (5 mg) by mouth every 6 hours as needed for pain maximum 4 tablet(s) per day   Refills:  0        pantoprazole 40 MG EC tablet   Commonly known as:  PROTONIX   Dose:  40 mg   Quantity:  90 tablet        Take 1 tablet (40 mg) by mouth daily Take 30-60 minutes before a meal.   Refills:  3        * promethazine 25 MG tablet   Commonly known as:  PHENERGAN   Dose:  25 mg   Quantity:  60 tablet        Take 1 tablet (25 mg) by mouth every 6 hours as needed for nausea   Refills:  3        * promethazine 6.25 MG/5ML syrup   Commonly known as:  PHENERGAN   Dose:  12.5 mg   Quantity:  560 mL        Take 10 mLs (12.5 mg) by mouth 4 times daily as needed for nausea   Refills:  0        rizatriptan 5 MG ODT tab   Commonly known as:  MAXALT-MLT   Dose:  5-10 mg   Quantity:  18 tablet        Take 1-2 tablets (5-10 mg) by mouth at onset of headache for migraine May repeat in 2 hours. Max 6 tablets/24 hours.   Refills:  3        senna-docusate 8.6-50 MG per tablet   Commonly known as:  SENOKOT-S;PERICOLACE   Dose:  1 tablet   Quantity:  60 tablet        Take 1 tablet by mouth 2 times daily as needed for constipation   Refills:  3        sucralfate 1 GM/10ML suspension   Commonly known as:  CARAFATE   Dose:  1 g   Quantity:  420 mL        Take 10 mLs (1 g) by mouth 4 times daily (before meals and nightly)   Refills:   3        * Notice:  This list has 6 medication(s) that are the same as other medications prescribed for you. Read the directions carefully, and ask your doctor or other care provider to review them with you.            Procedures and tests performed during your visit     CBC with platelets differential    Comprehensive metabolic panel    Peripheral IV: Standard    XR Abdomen 2 Views      Orders Needing Specimen Collection     None      Pending Results     Date and Time Order Name Status Description    10/23/2017 1546 XR Abdomen 2 Views Preliminary             Pending Culture Results     No orders found from 10/21/2017 to 10/24/2017.            Pending Results Instructions     If you had any lab results that were not finalized at the time of your Discharge, you can call the ED Lab Result RN at [phone]. You will be contacted by this team for any positive Lab results or changes in treatment. The nurses are available 7 days a week from 10A to 6:30P.  You can leave a message 24 hours per day and they will return your call.        Thank you for choosing [facility]       Thank you for choosing [facility] for your care. Our goal is always to provide you with excellent care. Hearing back from our patients is one way we can continue to improve our services. Please take a few minutes to complete the written survey that you may receive in the mail after you visit with us. Thank you!        [facility] Information     [employer] gives you secure access to your electronic health record. If you see a primary care provider, you can also send messages to your care team and make appointments. If you have questions, please call your primary care clinic.  If you do not have a primary care provider, please call [phone] and they will assist you.        Care EveryWhere ID     This is your Care EveryWhere ID. This could be used by other organizations to access your [facility] medical records  [id number]        Equal Access to Services      VIKY VILLANUEVA : Hadii pilo Chen, waaxda luqadaha, qaybta kaalmada ellyn, susan turner. So [facility] [phone].    ATENCIÓN: Si habla español, tiene a kerr disposición servicios gratuitos de asistencia lingüística. Llame al [phone].    We comply with applicable federal civil rights laws and [place] laws. We do not discriminate on the basis of race, color, national origin, age, disability, sex, sexual orientation, or gender identity.            After Visit Summary       This is your record. Keep this with you and show to your community pharmacist(s) and doctor(s) at your next visit.

## 2017-10-23 NOTE — TELEPHONE ENCOUNTER
Reason for Call:  Same Day Appointment, Requested Provider:  ZAYNAB Daley     PCP: Aura Rosario    Reason for visit: fu pain    Duration of symptoms:     Have you been treated for this in the past? 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 Declined another provider    Can we leave a detailed message on this number? YES    Phone number patient can be reached at: Home number on file [phone] (home)    Best Time: any    Call taken on 10/23/2017 at 7:06 AM by Kenzie Maria

## 2017-10-23 NOTE — PROGRESS NOTES
SUBJECTIVE:                                                    Katy Islas is a 27 year old female who presents to clinic today for the following health issues:    HPI    Back Pain Follow Up      Description:   Location of pain:  coccyx  Character of pain: dull ache, numbness left leg and constant  Pain radiation: Does radiate to left leg  Since last visit, pain is:  unchanged  New numbness or weakness in legs, not attributed to pain:  YES- new since surgery and feels weaker    Intensity: Currently 7/10    History:   Pain interferes with job: No,   Therapies tried without relief: miralax and enema per ED last night, cold and opioids  Therapies tried with relief: cold and rest     - Went to ED last night   - Very constipated, no bowel movement since before surgery (1 week)   - Did enema and now miralax   - Had bowel movement this AM, came out a lot   - For pain - ice, Fentanyl patch, oxycodone 1 every 4 hours (6-8 per day)     Took nausea medication before coming   - Oxycodone left at home ~10, got more from Dr. Millan     Frustrated left 2 messages for him, he sent new rx to [facility], on hold   - Insurance won't pay until tomorrow   - Taking 1 Valium at night, too sedating during day   - Moving next weekend, has been packing a lot   - Has help packing   - Done with box packing now, help with lifting   - Did get relief from Dilaudid last night for about 1-2 hours   - Hates high tolerance   - Mom would remind her in the middle of the night to take so they could stay ahead of the pain   - Wishes it would last entire night   - Flexeril in the morning   - Just need to get through this move then things can get better and can stop narcotics   - He ordered #60   - Can't drive    - Eliana Schofield was therapist, [place] [facility] [employer] [facility] [employer] [facility], mental health program, [place]       Wants to go back but can't get a ride, might be able to once he gets           Problem list and histories reviewed &  adjusted, as indicated.  Additional history: as documented      ROS:  Constitutional, HEENT, cardiovascular, pulmonary, gi and gu systems are negative, except as otherwise noted.      OBJECTIVE:   /80 (BP Location: Left arm, Patient Position: Chair, Cuff Size: Adult Regular)  Pulse 68  Temp 98.2  F (36.8  C) (Oral)  Wt 159 lb 4 oz (72.2 kg)  BMI 29.13 kg/m2  Body mass index is 29.13 kg/(m^2).  GENERAL APPEARANCE: healthy, alert and no distress  EYES: Eyes grossly normal to inspection, PERRLA, conjunctivae and sclerae without injection or discharge, EOM intact   MS: No musculoskeletal defects are noted and gait is age appropriate without ataxia   SKIN: No suspicious lesions or rashes, hydration status appears adeuqate with normal skin turgor   NEURO: Strength 5+ bilateral upper and lower extremities, sensation intact in distal bilateral upper and lower extremities, mentation- intact, speech- normal, reflexes- symmetric in bilateral upper and lower extremities, cranial nerves II-XII tested and are intact   BACK: No CVA tenderness, no paralumbar tenderness, no midline tenderness, normal ROM   PSYCH: Alert and oriented x3; speech- coherent , normal rate and volume; able to articulate logical thoughts, able to abstract reason, no tangential thoughts, no hallucinations or delusions, mentation appears normal, Mood is euthymic. Affect is appropriate for this mood state and bright. Thought content is free of suicidal ideation, hallucinations, and delusions. Dress is adequate and upkept. Eye contact is good during conversation.         Diagnostic Test Results:  none     ASSESSMENT/PLAN:       ICD-10-CM    1. Chronic pain syndrome G89.4 oxyCODONE (ROXICODONE) 10 MG IR tablet     naloxone (NARCAN) nasal spray   2. Chronic bilateral low back pain with left-sided sciatica M54.42 oxyCODONE (ROXICODONE) 10 MG IR tablet    G89.29 naloxone (NARCAN) nasal spray   3. S/P lumbar fusion Z98.1 oxyCODONE (ROXICODONE) 10 MG IR  tablet     naloxone (NARCAN) nasal spray   4. Lumbar disc disease with radiculopathy M51.16 oxyCODONE (ROXICODONE) 10 MG IR tablet     naloxone (NARCAN) nasal spray   5. Bipolar disorder, current episode mixed, severe, without psychotic features (H) F31.63    6. Contraceptive management Z30.9 Medroxyprogesterone inj/1mg (Depo Provera J-Code)     INJECTION INTRAMUSCULAR OR SUB-Q          reviewed   - Fentanyl filled on 10/20/17  - Oxycodone 5 mg #60 from Dr. Millan (her surgeon) on 10/18/17 - 10 day supply    Called [facility] on at 3 pm   - Can fill when at 75%, 10 day supply given on 10/18/17  - OK to fill tomorrow     Plan      Patient is taking fentanyl patch 25 mg, discussed I do not want to increase this      Will increase oxycodone, taking 30-40 mg oxycodone in addition with no pain relief right now, we will shred rx patient has hard copy of from her surgeon       Will increase to 10 mg tablet, 4 to 5 tablets for day, only increasing by 10 mg       Recheck weekly as this should start to improve soon        Discussed risks of high dose medication, recommend keep Narcan on hand, reviewed use and side effects       CSA 4/25/17, Utox 6/8/17,  reviewed today - as expected        Also spend some time discussing some concerns her mother brought forward to me (I told mom I could only listen, but not discuss anything about her care)   - Often flys off the handle, swears at mom, swears at people in the ED, leaves ED when frustrated, discussed how this make her looks like a drug seeker   - Recommend she call her counselor again and get started with this   - Also recommend getting in with a psychiatrist to discuss medication  - Discussed my worry over her mood with her mother leaving and her move and why mental health support is needed, also discussed how I think her mental health is impacting her recovery     The patient indicates understanding of these issues and agrees with the plan.    Follow up: weekly     Due  to language barrier, an  was present during the history-taking and subsequent discussion (and for part of the physical exam) with this patient.      A total of 50 minutes was spent with the patient today, with greater than 50% of the visit involving counseling and coordination of care.      Aura Rosario PA-C  [facility]

## 2017-10-23 NOTE — ED AVS SNAPSHOT
[facility] Emergency Department    [address] [place] [address]    Phone:  [phone]    Fax:  [phone]                                       Katy Islas   MRN: [id number]    Department:  [facility] Emergency Department   Date of Visit:  10/23/2017           After Visit Summary Signature Page     I have received my discharge instructions, and my questions have been answered. I have discussed any challenges I see with this plan with the nurse or doctor.    ..........................................................................................................................................  Patient/Patient Representative Signature      ..........................................................................................................................................  Patient Representative Print Name and Relationship to Patient    ..................................................               ................................................  Date                                            Time    ..........................................................................................................................................  Reviewed by Signature/Title    ...................................................              ..............................................  Date                                                            Time

## 2017-10-23 NOTE — DISCHARGE INSTRUCTIONS
Treating Constipation    Constipation is a common and often uncomfortable problem. Constipation means you have bowel movements fewer than 3 times per week, or strain to pass hard, dry stool. It can last a short time. Or it can be a problem that never seems to go away. The good news is that it can often be treated and controlled.  Eat more fiber  One of the best ways to help treat constipation is to increase your fiber intake. You can do this either through diet or by using fiber supplements. Fiber (in whole grains, fruits, and vegetables) adds bulk and absorbs water to soften the stool. This helps the stool pass through the colon more easily. When you increase your fiber intake, do it slowly to avoid side effects such as bloating. Also increase the amount of water that you drink. Eating more of the following foods can add fiber to your diet.    High-fiber cereals    Whole grains, bran, and brown rice    Vegetables such as carrots, broccoli, and greens    Fresh fruits (especially apples, pears, and dried fruits like raisins and apricots)    Nuts and legumes (especially beans such as lentils, kidney beans, and lima beans)  Get physically active  Exercise helps improve the working of your colon which helps ease constipation. Try to get some physical activity every day. If you haven t been active for a while, talk to your healthcare provider before starting again.  Laxatives  Your healthcare provider may suggest an over-the-counter product to help ease your constipation. He or she may suggest the use of bulk-forming agents or laxatives. The use of laxatives, if used as directed, is common and safe. Follow directions carefully when using them. See your healthcare provider for new-onset constipation, or long-term constipation, to rule out other causes such as medicines or thyroid disease.  Date Last Reviewed: 7/1/2016 2000-2017 [employer]. [address] [place] [address], [place] [address]. All rights reserved.  This information is not intended as a substitute for professional medical care. Always follow your healthcare professional's instructions.      Katy, as discussed, I want you to take your Miralax when you get home, then daily until you are done taking the narcotics for pain.  Continue taking the Senna as well.  Make sure you are drinking plenty of water, you can also add prunes/prune juice to your diet which will also help you have a bowel movement.   Good luck with your recovery.  Take care.

## 2017-10-23 NOTE — ED NOTES
Has surgery on Wednesday Oct. 18th, had her whole tale bone removed. Woke up this morning with increased pain, got up and walked around was ok for a while but then it came back really strong this afternoon. States the pain medicine is not controlling the pain.

## 2017-10-23 NOTE — ED PROVIDER NOTES
History     Chief Complaint   Patient presents with     Post-op Problem     The history is provided by the patient.     Katy Islas is a 27 year old female who presents to the emergency department with concerns of a post op problem. The patient had her whole tale bone removed on Oct. 18th due to the pain it caused from being fractured and pushing on the nerve bundle. The patient states that she woke up this morning with increased pain. She was fine for awhile and was walking around the house, then the pain came back stronger this afternoon. She reports taking Oxycodone every 3-4 hours for the pain but it is no longer controlling the pain. She says that the pain is radiating into her left hip and buttock. She sees her surgeon Dr. Millan again on the 17th of November. The patient states that she has been eating and drinking good. She has not had a bowel movement since the morning of her surgery and she takes Senna twice daily. She denies any recent fall or trauma since her surgery.     Problem List:    Patient Active Problem List    Diagnosis Date Noted     Closed fracture of coccyx with nonunion, subsequent encounter 10/12/2017     Priority: Medium     Upper GI bleed - suspected 07/01/2017     Priority: Medium     Tobacco abuse 07/01/2017     Priority: Medium     History of pseudoseizure 07/01/2017     Priority: Medium     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) 03/10/2017     Priority: Medium     Lumbar disc disease with radiculopathy 02/23/2017     Priority: Medium     S/P lumbar fusion 02/23/2017     Priority: Medium     Dr. YOVANI Millan, 2/23/17       Vitamin D deficiency 01/06/2017     Priority: Medium     Atypical mole 01/06/2017     Priority: Medium     Mild persistent asthma without complication 12/02/2016     Priority: Medium     Chronic bilateral low back pain with left-sided sciatica 12/02/2016     Priority: Medium     Bee sting allergy 09/16/2016     Priority: Medium     Gastroesophageal  reflux disease without esophagitis 08/26/2016     Priority: Medium     Herpes simplex virus infection 11/12/2015     Priority: Medium     Adjustment disorder with mixed anxiety and depressed mood 10/14/2015     Priority: Medium     Marijuana abuse 02/22/2015     Priority: Medium     Grand mal seizure disorder (H) 10/08/2013     Priority: Medium     Bipolar disorder (H) 01/31/2013     Priority: Medium     Problem list name updated by automated process. Provider to review       Nausea and vomiting 12/13/2012     Priority: Medium     Abdominal pain 12/13/2012     Priority: Medium     Oral thrush 06/25/2012     Priority: Medium     Chronic pain 02/09/2012     Priority: Medium     Patient is followed by Aura Rosario PA-C for ongoing prescription of pain medication.  All refills should only be approved by this provider, or covering partner.    Medication(s): Norco    Maximum quantity per month: 70  Clinic visit frequency required: Q 2 months     Controlled substance agreement:   Discussed and signed 4/25/17    Encounter-Level CSA - 01/12/2015:                 Controlled Substance Agreement - Scan on 1/26/2015  9:14 AM : Controlled Medication Agreement-01/12/15 (below)            Pain Clinic evaluation in the past: Yes       Date/Location:   [facility], fall 2016    DIRE Total Score(s):    4/25/2017   Total Score 17       Last [facility] website verification:  done on 4/25/17 by LOVE Maki   [email or web address]           Anxiety 09/22/2011     Priority: Medium     Mild major depression (H) 08/29/2011     Priority: Medium     Crohn's colitis (H) 08/04/2009     Priority: Medium     Dysmenorrhea 05/11/2007     Priority: Medium     Benign neoplasm of skin of lower limb, including hip 03/16/2004     Priority: Medium     Migraine      Priority: Medium     Dr. Farrar - Neurology.  Now on Inderal.  Seems to be working.  Follow-up 9/08  Problem list name updated by automated process. Provider to  review       Hearing loss      Priority: Medium     Congenital  Problem list name updated by automated process. Provider to review       Allergic rhinitis      Priority: Medium     Problem list name updated by automated process. Provider to review          Past Medical History:    Past Medical History:   Diagnosis Date     Abdominal pain 10/31- 11/4/2005     Allergic rhinitis, cause unspecified      Arthritis      C. 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     FUSION SPINE POSTERIOR MINIMALLY INVASIVE ONE LEVEL N/A 2/23/2017    Procedure: FUSION SPINE POSTERIOR MINIMALLY INVASIVE ONE LEVEL;  L4-5 Oblique Lateral Lumbar Interbody Fusion   Epidural steroid injection.   Transpedicular Bone marrow aspiration;  Surgeon: Jeniffer Eugene MD;  Location: [facility] OR     HC COLONOSCOPY THRU STOMA WITH BIOPSY  10/29/2003    Impression is that of normal appearing colonoscopy, without evidence of rectal bleeding.     HC COLONOSCOPY THRU STOMA, DIAGNOSTIC  10/00    normal     HC COLONOSCOPY THRU STOMA, DIAGNOSTIC  Oct 2009    Dr López- normal     HC EEG AWAKE AND SLEEP      abnormal     HC MRI BRAIN W/O CONTRAST  12/00    normal     HC REMOVAL GALLBLADDER  8/5/2009    [facility],  [place].     HC UGI ENDOSCOPY DIAG W BIOPSY  11/11/09    Normal esophagus     HC UGI ENDOSCOPY, SIMPLE EXAM  7/00, 10/00    mild chronic esophagitis and duodenitis, neg H pylori     HC UGI ENDOSCOPY, SIMPLE EXAM  01/20/2005    Esophagogastroduodenoscopy, colonoscopy with biopsies.  [facility]     HC UGI ENDOSCOPY, SIMPLE EXAM  7/1/2009    [facility], [place].     HC UGI ENDOSCOPY, SIMPLE EXAM  11/11/2009    attempted upper GI, pt. could not tolerate procedure:[facility]     ORTHOPEDIC SURGERY  October 19,2011    diskectomy L4-L5       Family History:    Family History   Problem Relation Age of Onset     GASTROINTESTINAL DISEASE Brother      severe Crohn's     Neurologic Disorder Brother      Seizures post head injury     Depression Brother      Substance Abuse Brother      Genitourinary Problems Father      kidney stones     DIABETES Father      HEART DISEASE Father      Open heart surgery     Breast Cancer Maternal Grandmother      Parkinsonism Maternal Grandmother      CEREBROVASCULAR DISEASE Paternal Grandmother      CANCER Maternal Grandfather      Lung     Cardiovascular Paternal Grandfather      Heart Attack     Substance Abuse Mother      Lung Cancer Paternal Uncle        Social History:  Marital Status:  Single [1]  Social History   Substance Use Topics     Smoking status: Current Every Day Smoker     Packs/day: 0.25     Types: Cigarettes     Smokeless tobacco: Never Used     Alcohol use 0.6 oz/week     1 Cans of beer per week      Comment: 1-2 drinks montlhly        Medications:      fentaNYL (DURAGESIC) 25 mcg/hr 72 hr patch   methylPREDNISolone (MEDROL DOSEPAK) 4 MG tablet   promethazine (PHENERGAN) 6.25 MG/5ML syrup   diphenoxylate-atropine (LOMOTIL) 2.5-0.025 MG per tablet   cetirizine (ZYRTEC) 10 MG tablet   oxyCODONE (ROXICODONE) 5 MG IR tablet   diazepam (VALIUM) 5 MG tablet   gabapentin (NEURONTIN) 300 MG capsule   cyclobenzaprine (FLEXERIL) 10 MG tablet    DULoxetine (CYMBALTA) 60 MG EC capsule   dexlansoprazole (DEXILANT) 60 MG CPDR CR capsule   albuterol (PROAIR HFA/PROVENTIL HFA/VENTOLIN HFA) 108 (90 BASE) MCG/ACT Inhaler   dicyclomine (BENTYL) 20 MG tablet   senna-docusate (SENOKOT-S;PERICOLACE) 8.6-50 MG per tablet   albuterol (2.5 MG/3ML) 0.083% neb solution   ARIPiprazole (ABILIFY) 5 MG tablet   meclizine (ANTIVERT) 12.5 MG tablet   ondansetron (ZOFRAN ODT) 4 MG ODT tab   rizatriptan (MAXALT-MLT) 5 MG ODT tab   medroxyPROGESTERone (DEPO-PROVERA) 150 MG/ML injection   medical cannabis inhalation (Patient's own supply.  Not a prescription)   EPINEPHrine (EPIPEN) 0.3 MG/0.3ML injection   Multiple Vitamin (MULTI-VITAMIN) per tablet   NONFORMULARY   order for DME   sucralfate (CARAFATE) 1 GM/10ML suspension   cetirizine (ZYRTEC) 10 MG tablet   promethazine (PHENERGAN) 25 MG tablet   pantoprazole (PROTONIX) 40 MG EC tablet   fluticasone (FLONASE) 50 MCG/ACT spray   Misc Natural Products (FLEX-A-MIN JOINT FLEX PO)     Review of Systems   Constitutional: Negative for appetite change.   Gastrointestinal: Positive for constipation.   Musculoskeletal: Positive for back pain.   All other systems reviewed and are negative.    Physical Exam   BP: 97/57  Heart Rate: 87  Temp: 98.5  F (36.9  C)  Resp: 18  Weight: 68 kg (150 lb)    Physical Exam   Constitutional: She is oriented to person, place, and time. She appears well-developed and well-nourished.   HENT:   Head: Atraumatic.   Eyes: EOM are normal.   Neck: Neck supple.   Cardiovascular: Normal rate, regular rhythm and normal heart sounds.    Pulmonary/Chest: Effort normal and breath sounds normal.   Abdominal: Soft. Bowel sounds are normal.   Musculoskeletal: Normal range of motion.        Lumbar back: She exhibits tenderness and pain.   Neurological: She is alert and oriented to person, place, and time.   Skin: Skin is warm and dry.   Incision site just above the buttock that is healing well   Psychiatric: She has a  normal mood and affect. Her behavior is normal.   Nursing note and vitals reviewed.    ED Course     ED Course     Procedures    Results for orders placed or performed during the hospital encounter of 10/23/17   XR Abdomen 2 Views    Narrative    XR ABDOMEN 2 VW   10/23/2017 5:35 PM      HISTORY:  evaluate for constipation    COMPARISON: None.    FINDINGS: The gas pattern is normal. No free air. Instrumentation is  seen over the lower lumbar spine. Moderate amount of stool in the  colon.      Impression    IMPRESSION:    1. Normal gas pattern.  2. Moderate amount of stool. This is slightly less than on 7/1/2017..    ANA SARABIA MD   CBC with platelets differential   Result Value Ref Range    WBC 6.8 4.0 - 11.0 10e9/L    RBC Count 3.36 (L) 3.8 - 5.2 10e12/L    Hemoglobin 11.4 (L) 11.7 - 15.7 g/dL    Hematocrit 33.0 (L) 35.0 - 47.0 %    MCV 98 78 - 100 fl    MCH 33.9 (H) 26.5 - 33.0 pg    MCHC 34.5 31.5 - 36.5 g/dL    RDW 12.3 10.0 - 15.0 %    Platelet Count 243 150 - 450 10e9/L    Diff Method Automated Method     % Neutrophils 60.2 %    % Lymphocytes 27.7 %    % Monocytes 8.3 %    % Eosinophils 3.7 %    % Basophils 0.1 %    Absolute Neutrophil 4.1 1.6 - 8.3 10e9/L    Absolute Lymphocytes 1.9 0.8 - 5.3 10e9/L    Absolute Monocytes 0.6 0.0 - 1.3 10e9/L    Absolute Eosinophils 0.3 0.0 - 0.7 10e9/L    Absolute Basophils 0.0 0.0 - 0.2 10e9/L   Comprehensive metabolic panel   Result Value Ref Range    Sodium 142 133 - 144 mmol/L    Potassium 3.9 3.4 - 5.3 mmol/L    Chloride 106 94 - 109 mmol/L    Carbon Dioxide 29 20 - 32 mmol/L    Anion Gap 7 3 - 14 mmol/L    Glucose 70 70 - 99 mg/dL    Urea Nitrogen 12 7 - 30 mg/dL    Creatinine 0.71 0.52 - 1.04 mg/dL    GFR Estimate >90 >60 mL/min/1.7m2    GFR Estimate If Black >90 >60 mL/min/1.7m2    Calcium 9.0 8.5 - 10.1 mg/dL    Bilirubin Total 0.2 0.2 - 1.3 mg/dL    Albumin 3.4 3.4 - 5.0 g/dL    Protein Total 6.7 (L) 6.8 - 8.8 g/dL    Alkaline Phosphatase 85 40 - 150 U/L    ALT 25 0  - 50 U/L    AST 23 0 - 45 U/L     *Note: Due to a large number of results and/or encounters for the requested time period, some results have not been displayed. A complete set of results can be found in Results Review.       Medications   0.9% sodium chloride BOLUS (0 mLs Intravenous Stopped 10/23/17 1757)   diphenhydrAMINE (BENADRYL) injection 25 mg (25 mg Intravenous Given 10/23/17 1705)   HYDROmorphone (PF) (DILAUDID) injection 1 mg (1 mg Intravenous Given 10/23/17 1747)     Assessments & Plan (with Medical Decision Making)  Katy is a 27-year-old female with a history of multiple medical problems including Crohn's, depression, anxiety, chronic pain, seizures, bipolar, marijuana abuse, asthma and recent removal of her fractured coccyx bone who presents to the emergency department today with increased pain at her incision site.  Patient on exam is well-hydrated she is nontoxic appearing.  Patient arrives here hemodynamically stable and afebrile.    The incision site on exam looks like it is healing well, there is no signs of infection, there is no drainage.  Of note, patient's mom is here signing for patient.  Patient is able to give a verbal response.  It is likely given her lack of a bowel movement for the last 5 days that this is playing a role in her symptoms today.  Peripheral IV was established and blood work was obtained to rule out any infectious etiology.  CBC returns with a normal white count of 6.8, hemoglobin is stable for patient at 11.4.  CMP returns unremarkable except for mildly low protein of 6.7.  X-ray of abdomen was obtained which does show moderate amount of stool.    Patient was given a liter of normal saline here as well as Dilaudid for pain and Benadryl for itching.  I did discuss with patient in detail that I felt her blood work is reassuring today and that her constipation was likely largely responsible for her symptoms especially in light of the fact that patient is taking multiple doses  of Percocet and wearing a fentanyl patch.    Patient has MiraLAX at home which she has not been taking, she refused any mag citrate here, and given her recent surgery I'm not going to give her an enema, she shows no signs of obstruction.  Patient was willing to start MiraLAX when she gets home and take it daily as long as she is on narcotic pain medication.  I also informed patient I would like her to continue taking her senna as previously prescribed.  If patient's symptoms worsen or she develops any other concerns she can return to the emergency department, she was encouraged to contact her surgeon as well tomorrow to discuss her visit here today.    Patient and her mom are agreeable to plan of care and patient was discharged in stable condition.       I have reviewed the nursing notes.    Discharge Medication List as of 10/23/2017  6:09 PM          Final diagnoses:   Drug-induced constipation     This document serves as a record of services personally performed by Flori Del Toro NP. It was created on their behalf by Noris Giang, a trained medical scribe. The creation of this record is based on the provider's personal observations and the statements of the patient. This document has been checked and approved by the attending provider.    Note: Chart documentation done in part with Dragon Voice Recognition software. Although reviewed after completion, some word and grammatical errors may remain.    10/23/2017   [facility] EMERGENCY DEPARTMENT     Flori Del Toro, NATA CNP  10/23/17 2239

## 2017-10-24 ENCOUNTER — TELEPHONE (OUTPATIENT)
Dept: FAMILY MEDICINE | Facility: OTHER | Age: 27
End: 2017-10-24

## 2017-10-24 ENCOUNTER — MYC MEDICAL ADVICE (OUTPATIENT)
Dept: FAMILY MEDICINE | Facility: OTHER | Age: 27
End: 2017-10-24

## 2017-10-24 ENCOUNTER — OFFICE VISIT (OUTPATIENT)
Dept: FAMILY MEDICINE | Facility: OTHER | Age: 27
End: 2017-10-24
Payer: MEDICARE

## 2017-10-24 VITALS
DIASTOLIC BLOOD PRESSURE: 80 MMHG | HEART RATE: 68 BPM | WEIGHT: 159.25 LBS | TEMPERATURE: 98.2 F | SYSTOLIC BLOOD PRESSURE: 104 MMHG | BODY MASS INDEX: 29.13 KG/M2

## 2017-10-24 DIAGNOSIS — F31.63 BIPOLAR DISORDER, CURRENT EPISODE MIXED, SEVERE, WITHOUT PSYCHOTIC FEATURES (H): ICD-10-CM

## 2017-10-24 DIAGNOSIS — Z98.1 S/P LUMBAR FUSION: ICD-10-CM

## 2017-10-24 DIAGNOSIS — M54.42 CHRONIC BILATERAL LOW BACK PAIN WITH LEFT-SIDED SCIATICA: ICD-10-CM

## 2017-10-24 DIAGNOSIS — M51.16 LUMBAR DISC DISEASE WITH RADICULOPATHY: ICD-10-CM

## 2017-10-24 DIAGNOSIS — G89.29 CHRONIC BILATERAL LOW BACK PAIN WITH LEFT-SIDED SCIATICA: ICD-10-CM

## 2017-10-24 DIAGNOSIS — G89.4 CHRONIC PAIN SYNDROME: Primary | ICD-10-CM

## 2017-10-24 PROCEDURE — 99215 OFFICE O/P EST HI 40 MIN: CPT | Mod: 25 | Performed by: PHYSICIAN ASSISTANT

## 2017-10-24 PROCEDURE — 96372 THER/PROPH/DIAG INJ SC/IM: CPT | Performed by: PHYSICIAN ASSISTANT

## 2017-10-24 RX ORDER — OXYCODONE HYDROCHLORIDE 10 MG/1
10 TABLET ORAL EVERY 4 HOURS PRN
Qty: 35 TABLET | Refills: 0 | Status: SHIPPED | OUTPATIENT
Start: 2017-10-24 | End: 2017-10-31

## 2017-10-24 ASSESSMENT — ANXIETY QUESTIONNAIRES
2. NOT BEING ABLE TO STOP OR CONTROL WORRYING: NEARLY EVERY DAY
GAD7 TOTAL SCORE: 17
7. FEELING AFRAID AS IF SOMETHING AWFUL MIGHT HAPPEN: SEVERAL DAYS
GAD7 TOTAL SCORE: 17
GAD7 TOTAL SCORE: 17
5. BEING SO RESTLESS THAT IT IS HARD TO SIT STILL: MORE THAN HALF THE DAYS
6. BECOMING EASILY ANNOYED OR IRRITABLE: NEARLY EVERY DAY
3. WORRYING TOO MUCH ABOUT DIFFERENT THINGS: NEARLY EVERY DAY
7. FEELING AFRAID AS IF SOMETHING AWFUL MIGHT HAPPEN: SEVERAL DAYS
1. FEELING NERVOUS, ANXIOUS, OR ON EDGE: NEARLY EVERY DAY
4. TROUBLE RELAXING: MORE THAN HALF THE DAYS

## 2017-10-24 ASSESSMENT — PATIENT HEALTH QUESTIONNAIRE - PHQ9
SUM OF ALL RESPONSES TO PHQ QUESTIONS 1-9: 3
SUM OF ALL RESPONSES TO PHQ QUESTIONS 1-9: 3
10. IF YOU CHECKED OFF ANY PROBLEMS, HOW DIFFICULT HAVE THESE PROBLEMS MADE IT FOR YOU TO DO YOUR WORK, TAKE CARE OF THINGS AT HOME, OR GET ALONG WITH OTHER PEOPLE: SOMEWHAT DIFFICULT

## 2017-10-24 NOTE — TELEPHONE ENCOUNTER
Please call [facility] and [facility] and see if they have any counselors that sign ASL or if they know of any that do.    Zach Rosario PA-C  [facility]

## 2017-10-24 NOTE — MR AVS SNAPSHOT
After Visit Summary   10/24/2017    Katy Islas    MRN: [id number]           Patient Information     Date Of Birth          1990        Visit Information        Provider Department      10/24/2017 2:00 PM Aura Rosario PA-C; ASL IS [facility]        Today's Diagnoses     Chronic pain syndrome        Chronic bilateral low back pain with left-sided sciatica        S/P lumbar fusion        Lumbar disc disease with radiculopathy          Care Instructions    - Increase Oxycodone to 10 mg tablets, can take 4-5 per day   - Recheck weekly               Follow-ups after your visit        Who to contact     If you have questions or need follow up information about today's clinic visit or your schedule please contact [facility] directly at [phone].  Normal or non-critical lab and imaging results will be communicated to you by MyChart, letter or phone within 4 business days after the clinic has received the results. If you do not hear from us within 7 days, please contact the clinic through MyChart or phone. If you have a critical or abnormal lab result, we will notify you by phone as soon as possible.  Submit refill requests through [employer] or call your pharmacy and they will forward the refill request to us. Please allow 3 business days for your refill to be completed.          Additional Information About Your Visit        MyChart Information     [employer] gives you secure access to your electronic health record. If you see a primary care provider, you can also send messages to your care team and make appointments. If you have questions, please call your primary care clinic.  If you do not have a primary care provider, please call [phone] and they will assist you.        Care EveryWhere ID     This is your Care EveryWhere ID. This could be used by other organizations to access your [facility] medical records  [id number]        Your Vitals Were      Pulse Temperature BMI (Body Mass Index)             68 98.2  F (36.8  C) (Oral) 29.13 kg/m2          Blood Pressure from Last 3 Encounters:   10/24/17 104/80   10/23/17 122/75   10/20/17 104/68    Weight from Last 3 Encounters:   10/24/17 159 lb 4 oz (72.2 kg)   10/23/17 150 lb (68 kg)   10/20/17 158 lb 3.2 oz (71.8 kg)              Today, you had the following     No orders found for display         Today's Medication Changes          These changes are accurate as of: 10/24/17  3:10 PM.  If you have any questions, ask your nurse or doctor.               Start taking these medicines.        Dose/Directions    naloxone nasal spray   Commonly known as:  NARCAN   Used for:  Chronic pain syndrome, Chronic bilateral low back pain with left-sided sciatica, S/P lumbar fusion, Lumbar disc disease with radiculopathy   Started by:  Aura Rosario PA-C        Dose:  4 mg   Spray 1 spray (4 mg) into one nostril alternating nostrils as needed for opioid reversal every 2-3 minutes until assistance arrives   Quantity:  0.2 mL   Refills:  0         These medicines have changed or have updated prescriptions.        Dose/Directions    oxyCODONE 10 MG IR tablet   Commonly known as:  ROXICODONE   This may have changed:    - medication strength  - how much to take  - when to take this  - additional instructions   Used for:  Chronic pain syndrome, Chronic bilateral low back pain with left-sided sciatica, S/P lumbar fusion, Lumbar disc disease with radiculopathy   Changed by:  Aura Rosario PA-C        Dose:  10 mg   Take 1 tablet (10 mg) by mouth every 4 hours as needed for pain maximum 5 tablet(s) per day   Quantity:  35 tablet   Refills:  0            Where to get your medicines      [facility] [address] [address] [address]  AT [facility] [address]  [address] [address], [facility] [address]     Phone:  [phone]     naloxone nasal spray          Some of these will need a paper prescription and others can be bought over the counter.  Ask your nurse if you have questions.     Bring a paper prescription for each of these medications     oxyCODONE 10 MG IR tablet                Primary Care Provider Office Phone # Fax #    Aura DIAZ LOVE Rosario [phone]       [address]  [facility] [address]        Equal Access to Services     PRATIK VILLANUEVA : Hadii aad ku hadasho Soomaali, waaxda luqadaha, qaybta kaalmada adeegyada, waxay idiin hayaan adeeg sadaluis mlakshmi santo . So [facility] [phone].    ATENCIÓN: Si maggi nathan, tiene a kerr disposición servicios gratuitos de asistencia lingüística. Llame al [phone].    We comply with applicable federal civil rights laws and [place] laws. We do not discriminate on the basis of race, color, national origin, age, disability, sex, sexual orientation, or gender identity.            Thank you!     Thank you for choosing [facility]  for your care. Our goal is always to provide you with excellent care. Hearing back from our patients is one way we can continue to improve our services. Please take a few minutes to complete the written survey that you may receive in the mail after your visit with us. Thank you!             Your Updated Medication List - Protect others around you: Learn how to safely use, store and throw away your medicines at www.disposemymeds.org.          This list is accurate as of: 10/24/17  3:10 PM.  Always use your most recent med list.                   Brand Name Dispense Instructions for use Diagnosis    * albuterol 108 (90 BASE) MCG/ACT Inhaler    PROAIR HFA/PROVENTIL HFA/VENTOLIN HFA    1 Inhaler    Inhale 2 puffs into the lungs every 6 hours as needed for shortness of breath / dyspnea or wheezing    Mild persistent asthma without complication       * albuterol (2.5 MG/3ML) 0.083% neb solution     50 vial    Take 1 vial (2.5 mg) by nebulization  every 6 hours as needed for shortness of breath / dyspnea or wheezing    Mild persistent asthma without complication       ARIPiprazole 5 MG tablet    ABILIFY    30 tablet    Take 1 tablet (5 mg) by mouth At Bedtime    Adjustment disorder with mixed anxiety and depressed mood, Anxiety       * cetirizine 10 MG tablet    zyrTEC     Take 10 mg by mouth daily        * cetirizine 10 MG tablet    zyrTEC    90 tablet    Take 1 tablet (10 mg) by mouth every evening    Chronic seasonal allergic rhinitis due to pollen       cyclobenzaprine 10 MG tablet    FLEXERIL    60 tablet    Take 2 tablets (20 mg) by mouth nightly as needed for muscle spasms or other (back pain)    Chronic bilateral low back pain with left-sided sciatica       dexlansoprazole 60 MG Cpdr CR capsule    DEXILANT    30 capsule    Take 1 capsule (60 mg) by mouth daily    Gastroesophageal reflux disease without esophagitis       diazepam 5 MG tablet    VALIUM    60 tablet    Take 0.5-1 tablets (2.5-5 mg) by mouth every 12 hours as needed for anxiety or sleep (MUSCLE SPASM) (one month supply)    Chronic pain syndrome, Chronic bilateral low back pain with left-sided sciatica, Lumbar disc disease with radiculopathy, S/P lumbar fusion       dicyclomine 20 MG tablet    BENTYL    30 tablet    Take 1 tablet (20 mg) by mouth 4 times daily as needed (ABDOMINAL CRAMPING) AS NEEDED FOR CRAMPING    Hx of Crohn's disease       diphenoxylate-atropine 2.5-0.025 MG per tablet    LOMOTIL    10 tablet    Take 1 tablet by mouth 4 times daily as needed for diarrhea        DULoxetine 60 MG EC capsule    CYMBALTA    30 capsule    Take 1 capsule (60 mg) by mouth daily    Adjustment disorder with mixed anxiety and depressed mood       EPINEPHrine 0.3 MG/0.3ML injection 2-pack    EPIPEN/ADRENACLICK/or ANY BX GENERIC EQUIV    0.6 mL    Inject 0.3 mLs (0.3 mg) into the muscle once as needed for anaphylaxis    Bee sting allergy       fentaNYL 25 mcg/hr 72 hr patch    DURAGESIC    4 patch     Place 1 patch onto the skin every 72 hours    Chronic bilateral low back pain with left-sided sciatica, Lumbar disc disease with radiculopathy, Closed fracture of coccyx with nonunion, subsequent encounter, S/P lumbar fusion       FLEX-A-MIN JOINT FLEX PO      Take by mouth daily Reported on 3/28/2017        gabapentin 300 MG capsule    NEURONTIN    90 capsule    Take 1 capsule (300 mg) by mouth 3 times daily    Chronic bilateral low back pain with left-sided sciatica       meclizine 12.5 MG tablet    ANTIVERT    30 tablet    Take 1 tablet (12.5 mg) by mouth 4 times daily as needed for dizziness    Dizziness       medical cannabis inhalation (Patient's own supply.  Not a prescription)      Inhale into the lungs 3 times daily as needed Reported on 3/28/2017        medroxyPROGESTERone 150 MG/ML injection    DEPO-PROVERA    3 mL    Inject 1 mL (150 mg) into the muscle every 3 months    Encounter for surveillance of injectable contraceptive, Dysmenorrhea       Multi-vitamin Tabs tablet   Generic drug:  multivitamin, therapeutic with minerals      Take 1 tablet by mouth daily Reported on 3/28/2017        naloxone nasal spray    NARCAN    0.2 mL    Spray 1 spray (4 mg) into one nostril alternating nostrils as needed for opioid reversal every 2-3 minutes until assistance arrives    Chronic pain syndrome, Chronic bilateral low back pain with left-sided sciatica, S/P lumbar fusion, Lumbar disc disease with radiculopathy       NONFORMULARY      Apply 30 mLs topically 4 times daily        ondansetron 4 MG ODT tab    ZOFRAN ODT    60 tablet    Take 1-2 tablets (4-8 mg) by mouth every 6 hours as needed for nausea Take 1-2 tablets by mouth as needed for nausea    Nausea       order for DME     1 each    Nebulizer with mask and tubing    Mild persistent asthma without complication       oxyCODONE 10 MG IR tablet    ROXICODONE    35 tablet    Take 1 tablet (10 mg) by mouth every 4 hours as needed for pain maximum 5 tablet(s) per  day    Chronic pain syndrome, Chronic bilateral low back pain with left-sided sciatica, S/P lumbar fusion, Lumbar disc disease with radiculopathy       promethazine 6.25 MG/5ML syrup    PHENERGAN    560 mL    Take 10 mLs (12.5 mg) by mouth 4 times daily as needed for nausea        rizatriptan 5 MG ODT tab    MAXALT-MLT    18 tablet    Take 1-2 tablets (5-10 mg) by mouth at onset of headache for migraine May repeat in 2 hours. Max 6 tablets/24 hours.    Migraine with aura and without status migrainosus, not intractable       senna-docusate 8.6-50 MG per tablet    SENOKOT-S;PERICOLACE    60 tablet    Take 1 tablet by mouth 2 times daily as needed for constipation    Drug-induced constipation       * Notice:  This list has 4 medication(s) that are the same as other medications prescribed for you. Read the directions carefully, and ask your doctor or other care provider to review them with you.

## 2017-10-24 NOTE — TELEPHONE ENCOUNTER
Charlene can get an  but no counselors sign. [facility] No-  The only thing else we could do is have Katy call her mental Health number on the back of her insurance card to direct her to see who she would be covered with and if they has ASL

## 2017-10-24 NOTE — TELEPHONE ENCOUNTER
List printed and I will give to patient.  Options in [place].     Zach Rosario PA-C  [facility]

## 2017-10-24 NOTE — NURSING NOTE
"Chief Complaint   Patient presents with     Pain       Initial /80 (BP Location: Left arm, Patient Position: Chair, Cuff Size: Adult Regular)  Pulse 68  Temp 98.2  F (36.8  C) (Oral)  Wt 159 lb 4 oz (72.2 kg)  BMI 29.13 kg/m2 Estimated body mass index is 29.13 kg/(m^2) as calculated from the following:    Height as of 10/20/17: 5' 2\" (1.575 m).    Weight as of this encounter: 159 lb 4 oz (72.2 kg).  Medication Reconciliation: complete   Amber Tirado CMA      "

## 2017-10-25 ENCOUNTER — HOSPITAL ENCOUNTER (EMERGENCY)
Facility: CLINIC | Age: 27
Discharge: HOME | End: 2017-10-25
Attending: FAMILY MEDICINE | Admitting: FAMILY MEDICINE
Payer: MEDICARE

## 2017-10-25 VITALS
WEIGHT: 155 LBS | SYSTOLIC BLOOD PRESSURE: 132 MMHG | TEMPERATURE: 98 F | OXYGEN SATURATION: 95 % | HEIGHT: 62 IN | DIASTOLIC BLOOD PRESSURE: 69 MMHG | RESPIRATION RATE: 20 BRPM | BODY MASS INDEX: 28.52 KG/M2

## 2017-10-25 DIAGNOSIS — T81.31XA POSTOPERATIVE WOUND DEHISCENCE, INITIAL ENCOUNTER: ICD-10-CM

## 2017-10-25 PROCEDURE — 99283 EMERGENCY DEPT VISIT LOW MDM: CPT | Mod: Z6 | Performed by: FAMILY MEDICINE

## 2017-10-25 PROCEDURE — 99282 EMERGENCY DEPT VISIT SF MDM: CPT | Performed by: FAMILY MEDICINE

## 2017-10-25 RX ORDER — POLYETHYLENE GLYCOL 3350 17 G/17G
1 POWDER, FOR SOLUTION ORAL DAILY
COMMUNITY
End: 2018-06-19

## 2017-10-25 ASSESSMENT — ANXIETY QUESTIONNAIRES: GAD7 TOTAL SCORE: 17

## 2017-10-25 ASSESSMENT — PATIENT HEALTH QUESTIONNAIRE - PHQ9: SUM OF ALL RESPONSES TO PHQ QUESTIONS 1-9: 3

## 2017-10-25 NOTE — TELEPHONE ENCOUNTER
Please submit PA. For reason, write high dose of opioids used daily due to post op pain, to have on hand in case of accidental overdose, also has concurrent use of Valium (benzodiazepine).     Thanks     Zach Rosario PA-C   [facility]

## 2017-10-25 NOTE — ED AVS SNAPSHOT
[facility]    [address] [place] [address]    Phone:  [phone]    Fax:  [phone]                                       Katy Islas   MRN: [id number]    Department:  [facility]   Date of Visit:  10/25/2017           Patient Information     Date Of Birth          1990        Your diagnoses for this visit were:     Postoperative wound dehiscence 0.5 cm area       You were seen by Emily Scherer MD.      Follow-up Information     Follow up with Deborah Millan DO In 2 days.    Specialty:  Internal Medicine    Why:  if not improving    Contact information:    [facility]  [address]  [place] [address] [phone]          Discharge Instructions       Thank you for giving us the opportunity to see you. You have a small wound dehiscence measuring less than 1/2 cm. There are no signs of active bleeding or infection.    The wound was taped withsteristrip and covered with gauze and tagaderm.    Follow up with your surgeon in 2-3 days if not improving.    Return to the Emergency Department at any time if your symptoms worsen.        Future Appointments        Provider Department Dept Phone Center    10/31/2017 9:00 AM Aura Rosario PA-C; ASL IS [facility] [phone] [facility]      24 Hour Appointment Hotline       To make an appointment at any [facility] call [phone] ([phone]). If you don't have a family doctor or clinic, we will help you find one. [facility] are conveniently located to serve the needs of you and your family.             Review of your medicines      Our records show that you are taking the medicines listed below. If these are incorrect, please call your family doctor or clinic.        Dose / Directions Last dose taken    * albuterol 108 (90 BASE) MCG/ACT Inhaler   Commonly known as:  PROAIR HFA/PROVENTIL HFA/VENTOLIN HFA   Dose:  2  puff   Quantity:  1 Inhaler        Inhale 2 puffs into the lungs every 6 hours as needed for shortness of breath / dyspnea or wheezing   Refills:  1        * albuterol (2.5 MG/3ML) 0.083% neb solution   Dose:  1 vial   Quantity:  50 vial        Take 1 vial (2.5 mg) by nebulization every 6 hours as needed for shortness of breath / dyspnea or wheezing   Refills:  1        ARIPiprazole 5 MG tablet   Commonly known as:  ABILIFY   Dose:  5 mg   Quantity:  30 tablet        Take 1 tablet (5 mg) by mouth At Bedtime   Refills:  0        * cetirizine 10 MG tablet   Commonly known as:  zyrTEC   Dose:  10 mg        Take 10 mg by mouth daily   Refills:  0        * cetirizine 10 MG tablet   Commonly known as:  zyrTEC   Dose:  10 mg   Quantity:  90 tablet        Take 1 tablet (10 mg) by mouth every evening   Refills:  3        cyclobenzaprine 10 MG tablet   Commonly known as:  FLEXERIL   Dose:  20 mg   Quantity:  60 tablet        Take 2 tablets (20 mg) by mouth nightly as needed for muscle spasms or other (back pain)   Refills:  3        dexlansoprazole 60 MG Cpdr CR capsule   Commonly known as:  DEXILANT   Dose:  60 mg   Quantity:  30 capsule        Take 1 capsule (60 mg) by mouth daily   Refills:  3        diazepam 5 MG tablet   Commonly known as:  VALIUM   Dose:  2.5-5 mg   Quantity:  60 tablet        Take 0.5-1 tablets (2.5-5 mg) by mouth every 12 hours as needed for anxiety or sleep (MUSCLE SPASM) (one month supply)   Refills:  3        dicyclomine 20 MG tablet   Commonly known as:  BENTYL   Dose:  20 mg   Quantity:  30 tablet        Take 1 tablet (20 mg) by mouth 4 times daily as needed (ABDOMINAL CRAMPING) AS NEEDED FOR CRAMPING   Refills:  3        diphenoxylate-atropine 2.5-0.025 MG per tablet   Commonly known as:  LOMOTIL   Dose:  1 tablet   Quantity:  10 tablet        Take 1 tablet by mouth 4 times daily as needed for diarrhea   Refills:  0        DULoxetine 60 MG EC capsule   Commonly known as:  CYMBALTA   Dose:  60 mg    Quantity:  30 capsule        Take 1 capsule (60 mg) by mouth daily   Refills:  3        EPINEPHrine 0.3 MG/0.3ML injection 2-pack   Commonly known as:  EPIPEN/ADRENACLICK/or ANY BX GENERIC EQUIV   Dose:  0.3 mg   Quantity:  0.6 mL        Inject 0.3 mLs (0.3 mg) into the muscle once as needed for anaphylaxis   Refills:  1        fentaNYL 25 mcg/hr 72 hr patch   Commonly known as:  DURAGESIC   Dose:  1 patch   Quantity:  4 patch        Place 1 patch onto the skin every 72 hours   Refills:  0        FLEX-A-MIN JOINT FLEX PO        Take by mouth daily Reported on 3/28/2017   Refills:  0        gabapentin 300 MG capsule   Commonly known as:  NEURONTIN   Dose:  300 mg   Quantity:  90 capsule        Take 1 capsule (300 mg) by mouth 3 times daily   Refills:  3        meclizine 12.5 MG tablet   Commonly known as:  ANTIVERT   Dose:  12.5 mg   Quantity:  30 tablet        Take 1 tablet (12.5 mg) by mouth 4 times daily as needed for dizziness   Refills:  3        medical cannabis inhalation (Patient's own supply.  Not a prescription)        Inhale into the lungs 3 times daily as needed Reported on 3/28/2017   Refills:  0        medroxyPROGESTERone 150 MG/ML injection   Commonly known as:  DEPO-PROVERA   Dose:  150 mg   Quantity:  3 mL        Inject 1 mL (150 mg) into the muscle every 3 months   Refills:  3        Multi-vitamin Tabs tablet   Dose:  1 tablet   Generic drug:  multivitamin, therapeutic with minerals        Take 1 tablet by mouth daily Reported on 3/28/2017   Refills:  0        naloxone nasal spray   Commonly known as:  NARCAN   Dose:  4 mg   Quantity:  0.2 mL        Spray 1 spray (4 mg) into one nostril alternating nostrils as needed for opioid reversal every 2-3 minutes until assistance arrives   Refills:  0        NONFORMULARY   Dose:  30 mL   Indication:  Marina Topical Gel Cream, 1 to 4 pumps  to affect area 4 times daily        Apply 30 mLs topically 4 times daily   Refills:  0        ondansetron 4 MG ODT  tab   Commonly known as:  ZOFRAN ODT   Dose:  4-8 mg   Quantity:  60 tablet        Take 1-2 tablets (4-8 mg) by mouth every 6 hours as needed for nausea Take 1-2 tablets by mouth as needed for nausea   Refills:  3        order for DME   Quantity:  1 each        Nebulizer with mask and tubing   Refills:  0        oxyCODONE 10 MG IR tablet   Commonly known as:  ROXICODONE   Dose:  10 mg   Quantity:  35 tablet        Take 1 tablet (10 mg) by mouth every 4 hours as needed for pain maximum 5 tablet(s) per day   Refills:  0        polyethylene glycol Packet   Commonly known as:  MIRALAX/GLYCOLAX   Dose:  1 packet        Take 1 packet by mouth daily   Refills:  0        promethazine 6.25 MG/5ML syrup   Commonly known as:  PHENERGAN   Dose:  12.5 mg   Quantity:  560 mL        Take 10 mLs (12.5 mg) by mouth 4 times daily as needed for nausea   Refills:  0        rizatriptan 5 MG ODT tab   Commonly known as:  MAXALT-MLT   Dose:  5-10 mg   Quantity:  18 tablet        Take 1-2 tablets (5-10 mg) by mouth at onset of headache for migraine May repeat in 2 hours. Max 6 tablets/24 hours.   Refills:  3        senna-docusate 8.6-50 MG per tablet   Commonly known as:  SENOKOT-S;PERICOLACE   Dose:  1 tablet   Quantity:  60 tablet        Take 1 tablet by mouth 2 times daily as needed for constipation   Refills:  3        * Notice:  This list has 4 medication(s) that are the same as other medications prescribed for you. Read the directions carefully, and ask your doctor or other care provider to review them with you.            Orders Needing Specimen Collection     None      Pending Results     No orders found from 10/23/2017 to 10/26/2017.            Pending Culture Results     No orders found from 10/23/2017 to 10/26/2017.            Pending Results Instructions     If you had any lab results that were not finalized at the time of your Discharge, you can call the ED Lab Result RN at [phone]. You will be contacted by this team for  any positive Lab results or changes in treatment. The nurses are available 7 days a week from 10A to 6:30P.  You can leave a message 24 hours per day and they will return your call.        Thank you for choosing [facility]       Thank you for choosing [facility] for your care. Our goal is always to provide you with excellent care. Hearing back from our patients is one way we can continue to improve our services. Please take a few minutes to complete the written survey that you may receive in the mail after you visit with us. Thank you!        [employer] Information     [employer] gives you secure access to your electronic health record. If you see a primary care provider, you can also send messages to your care team and make appointments. If you have questions, please call your primary care clinic.  If you do not have a primary care provider, please call [phone] and they will assist you.        Care EveryWhere ID     This is your Care EveryWhere ID. This could be used by other organizations to access your [facility] medical records  [id number]        Equal Access to Services     VIKY VILLANUEVA : Ruth Chen, wajonnie kelly, qaashley ragland, susan turner. So [facility] [phone].    ATENCIÓN: Si habla español, tiene a kerr disposición servicios gratuitos de asistencia lingüística. Llame al [phone].    We comply with applicable federal civil rights laws and [place] laws. We do not discriminate on the basis of race, color, national origin, age, disability, sex, sexual orientation, or gender identity.            After Visit Summary       This is your record. Keep this with you and show to your community pharmacist(s) and doctor(s) at your next visit.

## 2017-10-25 NOTE — TELEPHONE ENCOUNTER
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    Will also flag for TC to initiate PA for patient, if appropriate.  Abimbola Castro, CMA

## 2017-10-25 NOTE — ED AVS SNAPSHOT
[facility] Emergency Department    [address] [place] [address]    Phone:  [phone]    Fax:  [phone]                                       Katy Islas   MRN: [id number]    Department:  [facility] Emergency Department   Date of Visit:  10/25/2017           After Visit Summary Signature Page     I have received my discharge instructions, and my questions have been answered. I have discussed any challenges I see with this plan with the nurse or doctor.    ..........................................................................................................................................  Patient/Patient Representative Signature      ..........................................................................................................................................  Patient Representative Print Name and Relationship to Patient    ..................................................               ................................................  Date                                            Time    ..........................................................................................................................................  Reviewed by Signature/Title    ...................................................              ..............................................  Date                                                            Time

## 2017-10-25 NOTE — TELEPHONE ENCOUNTER
CDL-do you want us to pursue a PA for Narcan?   I called pharmacy, Medicare is not covering ID [id number] Phone [phone] if you want us to do PA.

## 2017-10-26 ENCOUNTER — MYC REFILL (OUTPATIENT)
Dept: FAMILY MEDICINE | Facility: OTHER | Age: 27
End: 2017-10-26

## 2017-10-26 ENCOUNTER — MYC MEDICAL ADVICE (OUTPATIENT)
Dept: FAMILY MEDICINE | Facility: OTHER | Age: 27
End: 2017-10-26

## 2017-10-26 ENCOUNTER — HOSPITAL ENCOUNTER (EMERGENCY)
Facility: CLINIC | Age: 27
Discharge: HOME | End: 2017-10-26
Attending: FAMILY MEDICINE | Admitting: FAMILY MEDICINE
Payer: MEDICARE

## 2017-10-26 VITALS
DIASTOLIC BLOOD PRESSURE: 71 MMHG | HEART RATE: 93 BPM | WEIGHT: 160 LBS | BODY MASS INDEX: 29.26 KG/M2 | OXYGEN SATURATION: 97 % | SYSTOLIC BLOOD PRESSURE: 104 MMHG | TEMPERATURE: 98.1 F | RESPIRATION RATE: 18 BRPM

## 2017-10-26 DIAGNOSIS — G89.29 CHRONIC BILATERAL LOW BACK PAIN WITH LEFT-SIDED SCIATICA: ICD-10-CM

## 2017-10-26 DIAGNOSIS — M54.42 CHRONIC BILATERAL LOW BACK PAIN WITH LEFT-SIDED SCIATICA: ICD-10-CM

## 2017-10-26 DIAGNOSIS — Z98.1 S/P LUMBAR FUSION: ICD-10-CM

## 2017-10-26 DIAGNOSIS — M51.16 LUMBAR DISC DISEASE WITH RADICULOPATHY: ICD-10-CM
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
## 2017-10-26 DIAGNOSIS — T81.31XA POSTOPERATIVE WOUND DEHISCENCE, INITIAL ENCOUNTER: ICD-10-CM

## 2017-10-26 DIAGNOSIS — M96.89 OTHER INTRAOPERATIVE AND POSTPROCEDURAL COMPLICATIONS AND DISORDERS OF THE MUSCULOSKELETAL SYSTEM: ICD-10-CM

## 2017-10-26 DIAGNOSIS — G89.4 CHRONIC PAIN SYNDROME: ICD-10-CM

## 2017-10-26 PROCEDURE — 99283 EMERGENCY DEPT VISIT LOW MDM: CPT | Performed by: PHYSICIAN ASSISTANT

## 2017-10-26 PROCEDURE — 25000132 ZZH RX MED GY IP 250 OP 250 PS 637: Mod: GY | Performed by: PHYSICIAN ASSISTANT

## 2017-10-26 PROCEDURE — A9270 NON-COVERED ITEM OR SERVICE: HCPCS | Mod: GY | Performed by: PHYSICIAN ASSISTANT

## 2017-10-26 PROCEDURE — 99284 EMERGENCY DEPT VISIT MOD MDM: CPT | Mod: Z6 | Performed by: PHYSICIAN ASSISTANT

## 2017-10-26 RX ORDER — OXYCODONE HYDROCHLORIDE 5 MG/1
10 TABLET ORAL ONCE
Status: COMPLETED | OUTPATIENT
Start: 2017-10-26 | End: 2017-10-26

## 2017-10-26 RX ADMIN — OXYCODONE HYDROCHLORIDE 10 MG: 5 TABLET ORAL at 21:02

## 2017-10-26 ASSESSMENT — ENCOUNTER SYMPTOMS
FEVER: 0
CHILLS: 0
WEAKNESS: 1
WOUND: 1
FATIGUE: 1

## 2017-10-26 NOTE — DISCHARGE INSTRUCTIONS
Thank you for giving us the opportunity to see you. You have a small wound dehiscence measuring less than 1/2 cm. There are no signs of active bleeding or infection.    The wound was taped withsteristrip and covered with gauze and tagaderm.    Follow up with your surgeon in 2-3 days if not improving.    Return to the Emergency Department at any time if your symptoms worsen.

## 2017-10-26 NOTE — TELEPHONE ENCOUNTER
"Spoke with [facility] -     Patient had a prescription written for 08/28 and had three refills that were all picked up.     Picked up on 08/31.  Picked up on 09/18 - \"lost medication\" so okay'd to be picked up early.  And picked up on 10/14.    So insurance isn't covering fill right now, as she would be due on 11/11.     May need new Rx with different sig? If that is the case, I can call the pharmacy and cancel the rx they have on file.    Please review/advise.  Abimbola Castro CMA    "

## 2017-10-26 NOTE — TELEPHONE ENCOUNTER
"Will route to CDL to review/advise. Has OV on 10/31, could discuss increasing Valium at that appointment?  Abimbola Castro [facility]    ER note from 10/25: \"Katy Islas is a 27 year old female with a small dehisced wound from her incision site near the coccyx.  Patient had a small amount of bleed but no active bleeding at the time of the exam.  Patient was requesting a shot of pain medication and I told her that that was not indicated.  There are no signs of infection or active bleeding at this time.  The wound was approximated with Steri-Strips and covered with gauze.  Patient was referred back to her surgeon in 2-3 days if not improving.  Patient was given additional dressings that she can change daily at home.  Patient was informed that the wound will heal by secondary intention.  Written after-visit summary and instructions were given at the time of discharge.\"  "

## 2017-10-26 NOTE — TELEPHONE ENCOUNTER
Please let patient know we will not be able to fill until 11/11/17 due to insurance since she picked up #[phone] on 10/14/17.     Zach Rosario PA-C  [facility]

## 2017-10-26 NOTE — TELEPHONE ENCOUNTER
Katy Islas is a 27 year old female who calls with possible infection.    NURSING ASSESSMENT:  Description:  Very sore today, was in the car for about half of the day today. Her bandages has yellow and red fluid on her bandage. Using gauze as her bandage and it is soaked. Had a surgery on 10/18/2017 on her tailbone. Temperature 99.3F taken orally. Feeling very tired.   Onset/duration:  Today wound is sore and has drainage  Pain scale (0-10)   Dull ache 6-7/10  LMP/preg/breast feeding:  No LMP recorded. Patient has had an injection.  Last exam/Treatment:  10/24/2017  Allergies:   Allergies   Allergen Reactions     Ativan [Lorazepam] Hives     At the IV site     Baclofen      hives     Bees Hives, Swelling and Difficulty breathing     Caffeine      Contrast Dye      Hives,   Updated 5/10/2016 CT Contrast.     Diazepam      IV form makes her become aggressive and angry     Iodine Hives     Methocarbamol Swelling     Metoclopramide      Other reaction(s): Tremors  LW Reaction: shaking/sweating     Midazolam      Other reaction(s): Agitation     Monosodium Glutamate      Morphine Other (See Comments)     Difficulty with urination     Nsaids Other (See Comments)     GI BLEED x2     Other (Do Not Use) Other (See Comments)     Xanaflex- pt becomes disoriented and loses bladder control     Percocet [Oxycodone-Acetaminophen] Itching     Reglan [Metoclopramide Hcl] Other (See Comments)     shaking     Soma [Carisoprodol] Visual Disturbance     Sleep walking     Tizanidine      Topamax Other (See Comments)     Topamax [Topiramate] Nausea     Tingling  GI/Vomit     Tramadol      Severe Headache, Seizure Risk     Tylenol W/Codeine [Acetaminophen-Codeine] Nausea and Itching     Tylenol 3     Valium Other (See Comments)     Becomes aggressive and angry     Versed Other (See Comments)     Zolmitriptan      Makes face feel like its twitching     Droperidol Anxiety     Flu Virus Vaccine Rash      Arm swelling     NURSING PLAN:  Nursing advice to patient to be evaulted in the ED for increased temperature, increased fatigue, increased pain and drainage from wound site    RECOMMENDED DISPOSITION:  To ED, another person to drive - to be evaulted in the ED for increased temperature, increased fatigue, increased pain and drainage from wound site  Will comply with recommendation: Yes  If further questions/concerns or if symptoms do not improve, worsen or new symptoms develop, call your PCP or [facility] as soon as possible.    NOTES:  Disposition was determined by the first positive assessment question, therefore all previous assessment questions were negative    Guideline used:  Telephone Triage Protocols for Nurses, Fifth Edition, Telma Odell  Wound Healing and Infection  Nursing Judgment  Huddled with CDL - in agreement with RNs recommendations   Nohemi Jennings, RN, BSN

## 2017-10-26 NOTE — ED AVS SNAPSHOT
[facility] Emergency Department    [address]     [place] [address]    Phone:  [phone]    Fax:  [phone]                                       Katy Islas   MRN: [id number]    Department:  [facility] Emergency Department   Date of Visit:  10/26/2017           Patient Information     Date Of Birth          1990        Your diagnoses for this visit were:     Postoperative wound dehiscence, initial encounter        You were seen by Emily Scherer MD and Tomas De La Paz PA-C.      Follow-up Information     Follow up with Aura Rosario PA-C. Go in 5 days.    Specialty:  Physician Assistant - Medical    Why:  For recheck of your condition.    Contact information:    [address]  [facility] [address]  [phone]          Discharge Instructions       Please change your 'saline wet to dry' dressing 4 times per day.  This will help heal the wound from the inside to the outside over the next 2 weeks.    Try and avoid pressure over the wound.          Future Appointments        Provider Department Dept Phone Center    10/31/2017 9:00 AM Aura Rosario PA-C; [facility] [phone] [facility]      24 Hour Appointment Hotline       To make an appointment at any [facility], call [phone] ([phone]). If you don't have a family doctor or clinic, we will help you find one. [facility] are conveniently located to serve the needs of you and your family.             Review of your medicines      Our records show that you are taking the medicines listed below. If these are incorrect, please call your family doctor or clinic.        Dose / Directions Last dose taken    * albuterol 108 (90 BASE) MCG/ACT Inhaler   Commonly known as:  PROAIR HFA/PROVENTIL HFA/VENTOLIN HFA   Dose:  2 puff   Quantity:  1 Inhaler        Inhale 2 puffs into the lungs every 6 hours as needed for shortness of breath /  dyspnea or wheezing   Refills:  1        * albuterol (2.5 MG/3ML) 0.083% neb solution   Dose:  1 vial   Quantity:  50 vial        Take 1 vial (2.5 mg) by nebulization every 6 hours as needed for shortness of breath / dyspnea or wheezing   Refills:  1        ARIPiprazole 5 MG tablet   Commonly known as:  ABILIFY   Dose:  5 mg   Quantity:  30 tablet        Take 1 tablet (5 mg) by mouth At Bedtime   Refills:  0        * cetirizine 10 MG tablet   Commonly known as:  zyrTEC   Dose:  10 mg        Take 10 mg by mouth daily   Refills:  0        * cetirizine 10 MG tablet   Commonly known as:  zyrTEC   Dose:  10 mg   Quantity:  90 tablet        Take 1 tablet (10 mg) by mouth every evening   Refills:  3        cyclobenzaprine 10 MG tablet   Commonly known as:  FLEXERIL   Dose:  20 mg   Quantity:  60 tablet        Take 2 tablets (20 mg) by mouth nightly as needed for muscle spasms or other (back pain)   Refills:  3        dexlansoprazole 60 MG Cpdr CR capsule   Commonly known as:  DEXILANT   Dose:  60 mg   Quantity:  30 capsule        Take 1 capsule (60 mg) by mouth daily   Refills:  3        diazepam 5 MG tablet   Commonly known as:  VALIUM   Dose:  2.5-5 mg   Quantity:  60 tablet        Take 0.5-1 tablets (2.5-5 mg) by mouth every 12 hours as needed for anxiety or sleep (MUSCLE SPASM) (one month supply)   Refills:  3        dicyclomine 20 MG tablet   Commonly known as:  BENTYL   Dose:  20 mg   Quantity:  30 tablet        Take 1 tablet (20 mg) by mouth 4 times daily as needed (ABDOMINAL CRAMPING) AS NEEDED FOR CRAMPING   Refills:  3        diphenoxylate-atropine 2.5-0.025 MG per tablet   Commonly known as:  LOMOTIL   Dose:  1 tablet   Quantity:  10 tablet        Take 1 tablet by mouth 4 times daily as needed for diarrhea   Refills:  0        DULoxetine 60 MG EC capsule   Commonly known as:  CYMBALTA   Dose:  60 mg   Quantity:  30 capsule        Take 1 capsule (60 mg) by mouth daily   Refills:  3        EPINEPHrine 0.3 MG/0.3ML  injection 2-pack   Commonly known as:  EPIPEN/ADRENACLICK/or ANY BX GENERIC EQUIV   Dose:  0.3 mg   Quantity:  0.6 mL        Inject 0.3 mLs (0.3 mg) into the muscle once as needed for anaphylaxis   Refills:  1        fentaNYL 25 mcg/hr 72 hr patch   Commonly known as:  DURAGESIC   Dose:  1 patch   Quantity:  4 patch        Place 1 patch onto the skin every 72 hours   Refills:  0        FLEX-A-MIN JOINT FLEX PO        Take by mouth daily Reported on 3/28/2017   Refills:  0        gabapentin 300 MG capsule   Commonly known as:  NEURONTIN   Dose:  300 mg   Quantity:  90 capsule        Take 1 capsule (300 mg) by mouth 3 times daily   Refills:  3        meclizine 12.5 MG tablet   Commonly known as:  ANTIVERT   Dose:  12.5 mg   Quantity:  30 tablet        Take 1 tablet (12.5 mg) by mouth 4 times daily as needed for dizziness   Refills:  3        medical cannabis inhalation (Patient's own supply.  Not a prescription)        Inhale into the lungs 3 times daily as needed Reported on 3/28/2017   Refills:  0        medroxyPROGESTERone 150 MG/ML injection   Commonly known as:  DEPO-PROVERA   Dose:  150 mg   Quantity:  3 mL        Inject 1 mL (150 mg) into the muscle every 3 months   Refills:  3        Multi-vitamin Tabs tablet   Dose:  1 tablet   Generic drug:  multivitamin, therapeutic with minerals        Take 1 tablet by mouth daily Reported on 3/28/2017   Refills:  0        naloxone nasal spray   Commonly known as:  NARCAN   Dose:  4 mg   Quantity:  0.2 mL        Spray 1 spray (4 mg) into one nostril alternating nostrils as needed for opioid reversal every 2-3 minutes until assistance arrives   Refills:  0        NONFORMULARY   Dose:  30 mL   Indication:  Marina Topical Gel Cream, 1 to 4 pumps  to affect area 4 times daily        Apply 30 mLs topically 4 times daily   Refills:  0        ondansetron 4 MG ODT tab   Commonly known as:  ZOFRAN ODT   Dose:  4-8 mg   Quantity:  60 tablet        Take 1-2 tablets (4-8 mg) by mouth  every 6 hours as needed for nausea Take 1-2 tablets by mouth as needed for nausea   Refills:  3        order for DME   Quantity:  1 each        Nebulizer with mask and tubing   Refills:  0        oxyCODONE 10 MG IR tablet   Commonly known as:  ROXICODONE   Dose:  10 mg   Quantity:  35 tablet        Take 1 tablet (10 mg) by mouth every 4 hours as needed for pain maximum 5 tablet(s) per day   Refills:  0        polyethylene glycol Packet   Commonly known as:  MIRALAX/GLYCOLAX   Dose:  1 packet        Take 1 packet by mouth daily   Refills:  0        promethazine 6.25 MG/5ML syrup   Commonly known as:  PHENERGAN   Dose:  12.5 mg   Quantity:  560 mL        Take 10 mLs (12.5 mg) by mouth 4 times daily as needed for nausea   Refills:  0        rizatriptan 5 MG ODT tab   Commonly known as:  MAXALT-MLT   Dose:  5-10 mg   Quantity:  18 tablet        Take 1-2 tablets (5-10 mg) by mouth at onset of headache for migraine May repeat in 2 hours. Max 6 tablets/24 hours.   Refills:  3        senna-docusate 8.6-50 MG per tablet   Commonly known as:  SENOKOT-S;PERICOLACE   Dose:  1 tablet   Quantity:  60 tablet        Take 1 tablet by mouth 2 times daily as needed for constipation   Refills:  3        * Notice:  This list has 4 medication(s) that are the same as other medications prescribed for you. Read the directions carefully, and ask your doctor or other care provider to review them with you.            Orders Needing Specimen Collection     None      Pending Results     No orders found from 10/24/2017 to 10/27/2017.            Pending Culture Results     No orders found from 10/24/2017 to 10/27/2017.            Pending Results Instructions     If you had any lab results that were not finalized at the time of your Discharge, you can call the ED Lab Result RN at [phone]. You will be contacted by this team for any positive Lab results or changes in treatment. The nurses are available 7 days a week from 10A to 6:30P.  You can  leave a message 24 hours per day and they will return your call.        Thank you for choosing [facility]       Thank you for choosing [facility] for your care. Our goal is always to provide you with excellent care. Hearing back from our patients is one way we can continue to improve our services. Please take a few minutes to complete the written survey that you may receive in the mail after you visit with us. Thank you!        [employer] Information     [employer] gives you secure access to your electronic health record. If you see a primary care provider, you can also send messages to your care team and make appointments. If you have questions, please call your primary care clinic.  If you do not have a primary care provider, please call [phone] and they will assist you.        Care EveryWhere ID     This is your Care EveryWhere ID. This could be used by other organizations to access your [facility] medical records  [id number]        Equal Access to Services     VIKY VILLANUEVA : Ruth Chen, mayank kelly, susan encinas. So [facility] [phone].    ATENCIÓN: Si habla español, tiene a kerr disposición servicios gratuitos de asistencia lingüística. Llame al [phone].    We comply with applicable federal civil rights laws and [place] laws. We do not discriminate on the basis of race, color, national origin, age, disability, sex, sexual orientation, or gender identity.            After Visit Summary       This is your record. Keep this with you and show to your community pharmacist(s) and doctor(s) at your next visit.

## 2017-10-26 NOTE — ED NOTES
Pt reports having tail bone surgery on the 18th of October, reports this evening she noticed bloody drainage when she stood up.

## 2017-10-26 NOTE — TELEPHONE ENCOUNTER
OK to increase Valium to twice a day. Has refills available at the pharmacy.     Zach Rosario PA-C  [facility]

## 2017-10-26 NOTE — ED NOTES
Pt is receiving  via [facility] and requested an in person . Services called [phone] and are paging out for assistance at 2200 and nursing staff is to call back in 20 minutes if no response.

## 2017-10-26 NOTE — ED PROVIDER NOTES
ED Provider Note     CC:     Chief Complaint   Patient presents with     Wound Check          History is obtained from the patient through a video .    HPI: Katy Islas is a 27 year old female presenting with bloody drainage from her incision site.  Patient had a nonunion caustics fracture and had surgery on October 18 with Dr. Millan .  Patient states that there was some itching in the area, and she may have scratched off some of the glue that was there.  There was some bleeding, and she is reporting a lot of pain.        Patient Active Problem List   Diagnosis     Hearing loss     Allergic rhinitis     Migraine     Benign neoplasm of skin of lower limb, including hip     Dysmenorrhea     Crohn's colitis (H)     Mild major depression (H)     Anxiety     Chronic pain     Oral thrush     Nausea and vomiting     Abdominal pain     Grand mal seizure disorder (H)     Bipolar disorder (H)     Marijuana abuse     Adjustment disorder with mixed anxiety and depressed mood     Herpes simplex virus infection     Gastroesophageal reflux disease without esophagitis     Bee sting allergy     Mild persistent asthma without complication     Chronic bilateral low back pain with left-sided sciatica     Vitamin D deficiency     Atypical mole     Lumbar disc disease with radiculopathy     S/P lumbar fusion     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)     Upper GI bleed - suspected     Tobacco abuse     History of pseudoseizure     Closed fracture of coccyx with nonunion, subsequent encounter       MEDS:     No current facility-administered medications on file prior to encounter.   Current Outpatient Prescriptions on File Prior to Encounter:  oxyCODONE (ROXICODONE) 10 MG IR tablet Take 1 tablet (10 mg) by mouth every 4 hours as needed for pain maximum 5 tablet(s) per day   naloxone (NARCAN) nasal spray Spray 1 spray (4 mg) into one nostril alternating  nostrils as needed for opioid reversal every 2-3 minutes until assistance arrives   fentaNYL (DURAGESIC) 25 mcg/hr 72 hr patch Place 1 patch onto the skin every 72 hours   promethazine (PHENERGAN) 6.25 MG/5ML syrup Take 10 mLs (12.5 mg) by mouth 4 times daily as needed for nausea   diphenoxylate-atropine (LOMOTIL) 2.5-0.025 MG per tablet Take 1 tablet by mouth 4 times daily as needed for diarrhea   cetirizine (ZYRTEC) 10 MG tablet Take 1 tablet (10 mg) by mouth every evening   diazepam (VALIUM) 5 MG tablet Take 0.5-1 tablets (2.5-5 mg) by mouth every 12 hours as needed for anxiety or sleep (MUSCLE SPASM) (one month supply)   gabapentin (NEURONTIN) 300 MG capsule Take 1 capsule (300 mg) by mouth 3 times daily   cyclobenzaprine (FLEXERIL) 10 MG tablet Take 2 tablets (20 mg) by mouth nightly as needed for muscle spasms or other (back pain)   DULoxetine (CYMBALTA) 60 MG EC capsule Take 1 capsule (60 mg) by mouth daily   dexlansoprazole (DEXILANT) 60 MG CPDR CR capsule Take 1 capsule (60 mg) by mouth daily   albuterol (PROAIR HFA/PROVENTIL HFA/VENTOLIN HFA) 108 (90 BASE) MCG/ACT Inhaler Inhale 2 puffs into the lungs every 6 hours as needed for shortness of breath / dyspnea or wheezing   dicyclomine (BENTYL) 20 MG tablet Take 1 tablet (20 mg) by mouth 4 times daily as needed (ABDOMINAL CRAMPING) AS NEEDED FOR CRAMPING   senna-docusate (SENOKOT-S;PERICOLACE) 8.6-50 MG per tablet Take 1 tablet by mouth 2 times daily as needed for constipation   albuterol (2.5 MG/3ML) 0.083% neb solution Take 1 vial (2.5 mg) by nebulization every 6 hours as needed for shortness of breath / dyspnea or wheezing   ARIPiprazole (ABILIFY) 5 MG tablet Take 1 tablet (5 mg) by mouth At Bedtime   meclizine (ANTIVERT) 12.5 MG tablet Take 1 tablet (12.5 mg) by mouth 4 times daily as needed for dizziness   ondansetron (ZOFRAN ODT) 4 MG ODT tab Take 1-2 tablets (4-8 mg) by mouth every 6 hours as needed for nausea Take 1-2 tablets by mouth as needed for  "nausea   rizatriptan (MAXALT-MLT) 5 MG ODT tab Take 1-2 tablets (5-10 mg) by mouth at onset of headache for migraine May repeat in 2 hours. Max 6 tablets/24 hours.   medroxyPROGESTERone (DEPO-PROVERA) 150 MG/ML injection Inject 1 mL (150 mg) into the muscle every 3 months   cetirizine (ZYRTEC) 10 MG tablet Take 10 mg by mouth daily   medical cannabis inhalation (Patient's own supply.  Not a prescription) Inhale into the lungs 3 times daily as needed Reported on 3/28/2017   NONFORMULARY Apply 30 mLs topically 4 times daily   order for DME Nebulizer with mask and tubing   EPINEPHrine (EPIPEN) 0.3 MG/0.3ML injection Inject 0.3 mLs (0.3 mg) into the muscle once as needed for anaphylaxis   Misc Natural Products (FLEX-A-MIN JOINT FLEX PO) Take by mouth daily Reported on 3/28/2017   Multiple Vitamin (MULTI-VITAMIN) per tablet Take 1 tablet by mouth daily Reported on 3/28/2017       Allergies: Ativan [lorazepam]; Baclofen; Bees; Caffeine; Contrast dye; Diazepam; Iodine; Methocarbamol; Metoclopramide; Midazolam; Monosodium glutamate; Morphine; Nsaids; Other (do not use); Percocet [oxycodone-acetaminophen]; Reglan [metoclopramide hcl]; Soma [carisoprodol]; Tizanidine; Topamax; Topamax [topiramate]; Tramadol; Tylenol w/codeine [acetaminophen-codeine]; Valium; Versed; Zolmitriptan; Droperidol; and Flu virus vaccine    Triage and ursing notes were reviewed.    ROS: Negative except in HPI    Physical Exam:  Vitals:    10/25/17 2134   BP: 132/69   Resp: 20   Temp: 98  F (36.7  C)   TempSrc: Temporal   SpO2: 95%   Weight: 70.3 kg (155 lb)   Height: 1.575 m (5' 2\")     GENERAL APPEARANCE: Alert, no apparent distress laying on her left decubitus position; tenderness with inspection   EYES: PER  HENT: Normal external exam  RESP: Normal respiratory effort  Coccyx:: Small area of dehiscence, with no active bleeding.  No signs of infection.  SKIN: no rash; as above      No results found. However, due to the size of the patient record, " not all encounters were searched. Please check Results Review for a complete set of results.    Procedure Note: 1/4 Steri-Strip ×2 was applied after benzoin to help approximate the edges.  The wound was then dressed with 2 x 2 gauze and Tegaderm.    Impression:  Final diagnoses:   Postoperative wound dehiscence - 0.5 cm area         ED Course & Medical Decision Making (Plan):  Katy Islas is a 27 year old female with a small dehisced wound from her incision site near the coccyx.  Patient had a small amount of bleed but no active bleeding at the time of the exam.  Patient was requesting a shot of pain medication and I told her that that was not indicated.  There are no signs of infection or active bleeding at this time.  The wound was approximated with Steri-Strips and covered with gauze.  Patient was referred back to her surgeon in 2-3 days if not improving.  Patient was given additional dressings that she can change daily at home.  Patient was informed that the wound will heal by secondary intention.  Written after-visit summary and instructions were given at the time of discharge.          Discharge Medication List as of 10/25/2017 10:35 PM              This note was completed in part using Dragon voice recognition, and may contain word and grammatical errors.        Emily Scherer MD  10/25/17 5897

## 2017-10-26 NOTE — TELEPHONE ENCOUNTER
Message from [employer]:  Original authorizing provider: LOVE Bishop would like a refill of the following medications:  diazepam (VALIUM) 5 MG tablet [Aura Rosario PA-C]    Preferred pharmacy: [facility] [address] [address] [address] [place] [facility]    Comment:

## 2017-10-26 NOTE — ED NOTES
"Video  used throughout patients ED stay. This RN offered patient to wait for  to come in person and she declined offer. \"I just want to go\". Discharge instructions reviewed with patient using video  and patient verbalizes understanding.   "

## 2017-10-26 NOTE — PROGRESS NOTES
SUBJECTIVE:                                                    Katy Islas is a 27 year old female who presents to clinic today for the following health issues:      HPI    ED/UC Followup:    Facility:  [facility]   Date of visit: 10/25/17 & 10/26/17   Reason for visit: Post op pain and wound re opened   Current Status: thinks she is bleeding from wound again         Back Pain Follow Up      Description:   Location of pain:  Tail bone  Character of pain: dull ache hurts to sit  Pain radiation: Does not radiate  Since last visit, pain is:  improved  New numbness or weakness in legs, not attributed to pain:  no     Intensity: Currently 5/10    History:   Pain interferes with job: Not applicable  Therapies tried without relief: Physical Therapy  Therapies tried with relief: water thereapy      - Saw surgeon on 10/30/17 (yesterday) was given antibiotics yesterday and was told wound was closed.  Did not have  yesterday.    - Keflex every 6 hours #20 and Dilaudid   - Oxycodone more effective   - Dilaudid makes her throw up, took yesterday, didn't take today    - Incision draining a little, not deep   - Told pain was to be expected, will take time to recover   - Forgot to ask about physical therapy   - Moved last weekend to [place], finish this weekend   - Fentanyl not helping, feels like bad bruise   - Haven't worn in a few days (Saturday), no withdrawal   - Oxycodone - 5 per day, not enough pain relief, none left - ran out yesterday   - Wants to reduce but maybe bad days go up to 6?   - Flexeril at night helps   - Doesn't need Valium anymore   - Lower back better   - Doctor did say can take Naproxen           Plan from last visit on 10/24/17   reviewed   - Fentanyl filled on 10/20/17  - Oxycodone 5 mg #60 from Dr. Millan (her surgeon) on 10/18/17 - 10 day supply     Called Sd on at 3 pm   - Can fill when at 75%, 10 day supply given on 10/18/17  - OK to fill tomorrow      Plan       Patient is taking fentanyl patch 25 mg, discussed I do not want to increase this      Will increase oxycodone, taking 30-40 mg oxycodone in addition with no pain relief right now, we will shred rx patient has hard copy of from her surgeon       Will increase to 10 mg tablet, 4 to 5 tablets for day, only increasing by 10 mg       Recheck weekly as this should start to improve soon        Discussed risks of high dose medication, recommend keep Narcan on hand, reviewed use and side effects       CSA 4/25/17, Utox 6/8/17,  reviewed today - as expected        Also spend some time discussing some concerns her mother brought forward to me (I told mom I could only listen, but not discuss anything about her care)   - Often flys off the handle, swears at mom, swears at people in the ED, leaves ED when frustrated, discussed how this make her looks like a drug seeker   - Recommend she call her counselor again and get started with this   - Also recommend getting in with a psychiatrist to discuss medication  - Discussed my worry over her mood with her mother leaving and her move and why mental health support is needed, also discussed how I think her mental health is impacting her recovery             Problem list and histories reviewed & adjusted, as indicated.  Additional history: as documented    ROS:  Constitutional, HEENT, cardiovascular, pulmonary, gi and gu systems are negative, except as otherwise noted.      OBJECTIVE:   /70 (BP Location: Left arm, Patient Position: Chair, Cuff Size: Adult Regular)  Pulse 80  Temp 97.3  F (36.3  C) (Temporal)  Resp 16  Wt 152 lb 3.2 oz (69 kg)  LMP 10/29/2017  BMI 27.84 kg/m2  Body mass index is 27.84 kg/(m^2).  GENERAL APPEARANCE: healthy, alert and no distress  EYES: Eyes grossly normal to inspection, PERRLA, conjunctivae and sclerae without injection or discharge, EOM intact   MS: No musculoskeletal defects are noted and gait is age appropriate without ataxia    SKIN: No suspicious lesions or rashes, hydration status appears adeuqate with normal skin turgor   NEURO: Deferred   BACK: Tailbone incision ~1 cm long and 1 cm deep, no signs of infection, signs of healing   PSYCH: Alert and oriented x3; speech- coherent , normal rate and volume; able to articulate logical thoughts, able to abstract reason, no tangential thoughts, no hallucinations or delusions, mentation appears normal, Mood is euthymic. Affect is appropriate for this mood state and bright. Thought content is free of suicidal ideation, hallucinations, and delusions. Dress is adequate and upkept. Eye contact is good during conversation.       Diagnostic Test Results:  none     ASSESSMENT/PLAN:       ICD-10-CM    1. Chronic bilateral low back pain with left-sided sciatica M54.42 PHYSICAL THERAPY REFERRAL    G89.29 oxyCODONE (ROXICODONE) 10 MG IR tablet   2. Lumbar disc disease with radiculopathy M51.16 PHYSICAL THERAPY REFERRAL     oxyCODONE (ROXICODONE) 10 MG IR tablet   3. Closed fracture of coccyx with nonunion, subsequent encounter S32.2XXK PHYSICAL THERAPY REFERRAL   4. S/P lumbar fusion Z98.1 PHYSICAL THERAPY REFERRAL     oxyCODONE (ROXICODONE) 10 MG IR tablet   5. Chronic pain syndrome G89.4 oxyCODONE (ROXICODONE) 10 MG IR tablet      reviewed   - Fentanyl filled on 10/20/17  - Oxycodone 5 mg #60 from Dr. Millan (her surgeon) on 10/18/17 - 10 day supply  - Oxycodone from me as prescribed on 10/24/17  - Dilaudid 2 mg, #40 from Dr. Millan yesterday      Call from [employer] at 9:45 am - Just making sure I new about Dilaudid and Fentanyl   - Told them both where D/c'd, instructed to dispose of dilaudid and Fentanyl was used up (only 1 week prescribed)        Plan      Patient to destroy Dilaudid      Oxycodone and Fentanyl completely used up     Will increase oxycodone to 5-6 per day, Oxycodone 10 mg, 50-60 mg per day, MME 75-90       Recheck every 2 weeks as this should start to improve soon         Discussed risks of high dose medication, recommend keep Narcan on hand, reviewed use and side effects       CSA 4/25/17, Utox 6/8/17,  reviewed today - as expected and as above      Physical therapy referral for pool therapy at [facility], should not start this until wound is completely healed, but should get process going with scheduling and insurance     The patient indicates understanding of these issues and agrees with the plan.    Follow up: 2 weeks     Due to language barrier, an  was present during the history-taking and subsequent discussion (and for part of the physical exam) with this patient.          Aura Rosario PA-C  [facility]

## 2017-10-26 NOTE — ED AVS SNAPSHOT
[facility] Emergency Department    [address] [place] [address]    Phone:  [phone]    Fax:  [phone]                                       Katy Islas   MRN: [id number]    Department:  [facility] Emergency Department   Date of Visit:  10/26/2017           After Visit Summary Signature Page     I have received my discharge instructions, and my questions have been answered. I have discussed any challenges I see with this plan with the nurse or doctor.    ..........................................................................................................................................  Patient/Patient Representative Signature      ..........................................................................................................................................  Patient Representative Print Name and Relationship to Patient    ..................................................               ................................................  Date                                            Time    ..........................................................................................................................................  Reviewed by Signature/Title    ...................................................              ..............................................  Date                                                            Time

## 2017-10-27 ENCOUNTER — MYC MEDICAL ADVICE (OUTPATIENT)
Dept: FAMILY MEDICINE | Facility: OTHER | Age: 27
End: 2017-10-27

## 2017-10-27 ENCOUNTER — TELEPHONE (OUTPATIENT)
Dept: FAMILY MEDICINE | Facility: OTHER | Age: 27
End: 2017-10-27

## 2017-10-27 NOTE — TELEPHONE ENCOUNTER
As per previous messages, I can not work in. There is not enough time on Monday. Needs to keep her Tuesday appointment.    Zach Rosario PA-C  [facility]

## 2017-10-27 NOTE — ED NOTES
Patient on call light asking to leave. This nurse was getting her pain meds and heading into the room to do a wet to dry dressing change as instructed by the provider. Patient was out of bed, dressed and appeared upset. Patient refused to let this nurse perform the dressing change. This nurse then asked the patient if she still wanted the pain medication, patient said yes. Then this nurse instructed the patients mother on how to perform the dressing change and sent home with supplies.

## 2017-10-27 NOTE — DISCHARGE INSTRUCTIONS
Please change your 'saline wet to dry' dressing 4 times per day.  This will help heal the wound from the inside to the outside over the next 2 weeks.    Try and avoid pressure over the wound.

## 2017-10-27 NOTE — ED NOTES
Went in to perform wet to dry dressing, patient had just been on call light asking if she could have some water and something for her pain. Updated provider, who shortly thereafter had a discussion with the patient for ordering pain medication. Will return for the dressing after patient is medicated for her pain.

## 2017-10-27 NOTE — TELEPHONE ENCOUNTER
Routing to provider to review appointment request. Please review and advise. Nohemi Jennings RN, BSN

## 2017-10-27 NOTE — TELEPHONE ENCOUNTER
I can not accomodate today or Monday. Should stick with plan to see me on Tuesday. Patient needs to work on calming and doing as little as possible. We can not increase pain medication any further. Should call Dr. Millan for advise on wound and fever.     Zach Rosario PA-C  [facility]

## 2017-10-27 NOTE — TELEPHONE ENCOUNTER
Reason for Call:  Same Day Appointment, Requested Provider:  ZAYNAB Daley     PCP: Aura Rosario    Reason for visit: Depressed, in pain    Duration of symptoms: unknown    Have you been treated for this in the past? 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    Can we leave a detailed message on this number? YES    Phone number patient can be reached at: Home number on file [phone] (home)    Best Time: any    Call taken on 10/27/2017 at 10:42 AM by Genie Luo

## 2017-10-27 NOTE — ED NOTES
She had a tailbone removal 10/18 and the wound opened up yesterday, she was seen here, there was a lot of drainage today so her surgeon sent her in again.

## 2017-10-27 NOTE — TELEPHONE ENCOUNTER
[facility] were needing [facility] info, this was faxed back to [facility].

## 2017-10-27 NOTE — ED PROVIDER NOTES
"  History     Chief Complaint   Patient presents with     Post-op Problem     HPI Comments: Patient has congenital hearing loss. Mom acted as a .     The history is provided by the patient and a relative. The history is limited by a language barrier.     Katy Islas is a 27 year old female who presents to the ED complaining of a postoperative concern. The patient had a nonunion coccygeal fracture and underwent coccygeal resection on 10/18 with Dr. Millan of [facility]. She was experiencing some pain but otherwise did not experience complications until yesterday. She presented to the ED last evening complaining of drainage and pain from the incision site. She was found to have a small wound dehiscence at the site of the incision. She was reassured that this was not too concerning and steri strips were placed over the wound. Today, she returns complaining of worsening drainage from the incision. She describes the drainage and \"yellow and bloody\". She also complains of feeling \"tired and weak\". Patient denies any fever, chills, or other associated symptoms.      Problem List:    Patient Active Problem List    Diagnosis Date Noted     Closed fracture of coccyx with nonunion, subsequent encounter 10/12/2017     Priority: Medium     Upper GI bleed - suspected 07/01/2017     Priority: Medium     Tobacco abuse 07/01/2017     Priority: Medium     History of pseudoseizure 07/01/2017     Priority: Medium     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) 03/10/2017     Priority: Medium     Lumbar disc disease with radiculopathy 02/23/2017     Priority: Medium     S/P lumbar fusion 02/23/2017     Priority: Medium     Dr. YOVANI Millan, 2/23/17       Vitamin D deficiency 01/06/2017     Priority: Medium     Atypical mole 01/06/2017     Priority: Medium     Mild persistent asthma without complication 12/02/2016     Priority: Medium     Chronic bilateral low back pain with left-sided " sciatica 12/02/2016     Priority: Medium     Bee sting allergy 09/16/2016     Priority: Medium     Gastroesophageal reflux disease without esophagitis 08/26/2016     Priority: Medium     Herpes simplex virus infection 11/12/2015     Priority: Medium     Adjustment disorder with mixed anxiety and depressed mood 10/14/2015     Priority: Medium     Marijuana abuse 02/22/2015     Priority: Medium     Grand mal seizure disorder (H) 10/08/2013     Priority: Medium     Bipolar disorder (H) 01/31/2013     Priority: Medium     Problem list name updated by automated process. Provider to review       Nausea and vomiting 12/13/2012     Priority: Medium     Abdominal pain 12/13/2012     Priority: Medium     Oral thrush 06/25/2012     Priority: Medium     Chronic pain 02/09/2012     Priority: Medium     Patient is followed by Aura Rosario PA-C for ongoing prescription of pain medication.  All refills should only be approved by this provider, or covering partner.    Medication(s): Norco    Maximum quantity per month: 70  Clinic visit frequency required: Q 2 months     Controlled substance agreement:   Discussed and signed 4/25/17    Encounter-Level CSA - 01/12/2015:                 Controlled Substance Agreement - Scan on 1/26/2015  9:14 AM : Controlled Medication Agreement-01/12/15 (below)            Pain Clinic evaluation in the past: Yes       Date/Location:   [facility], fall 2016    DIRE Total Score(s):    4/25/2017   Total Score 17       Last [facility] website verification:  done on 4/25/17 by LOVE Maki   [email or web address]           Anxiety 09/22/2011     Priority: Medium     Mild major depression (H) 08/29/2011     Priority: Medium     Crohn's colitis (H) 08/04/2009     Priority: Medium     Dysmenorrhea 05/11/2007     Priority: Medium     Benign neoplasm of skin of lower limb, including hip 03/16/2004     Priority: Medium     Migraine      Priority: Medium     Dr. Farrar - Neurology.  Now  on Inderal.  Seems to be working.  Follow-up 9/08  Problem list name updated by automated process. Provider to review       Hearing loss      Priority: Medium     Congenital  Problem list name updated by automated process. Provider to review       Allergic rhinitis      Priority: Medium     Problem list name updated by automated process. Provider to review          Past Medical History:    Past Medical History:   Diagnosis Date     Abdominal pain 10/31- 11/4/2005     Allergic rhinitis, cause unspecified      Arthritis      C. 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     FUSION SPINE POSTERIOR MINIMALLY INVASIVE ONE LEVEL N/A 2/23/2017    Procedure: FUSION SPINE POSTERIOR MINIMALLY INVASIVE ONE LEVEL;  L4-5 Oblique Lateral Lumbar Interbody Fusion   Epidural steroid injection.   Transpedicular Bone marrow aspiration;  Surgeon: Jeniffer Eugene MD;  Location: [facility] OR     HC COLONOSCOPY THRU STOMA WITH BIOPSY  10/29/2003    Impression is that of normal appearing colonoscopy, without evidence of rectal bleeding.     HC COLONOSCOPY THRU STOMA, DIAGNOSTIC  10/00    normal     HC COLONOSCOPY THRU STOMA, DIAGNOSTIC  Oct 2009    Dr López- normal     HC EEG AWAKE AND SLEEP      abnormal      HC MRI BRAIN W/O CONTRAST  12/00    normal     HC REMOVAL GALLBLADDER  8/5/2009    [facility], [place]     HC UGI ENDOSCOPY DIAG W BIOPSY  11/11/09    Normal esophagus     HC UGI ENDOSCOPY, SIMPLE EXAM  7/00, 10/00    mild chronic esophagitis and duodenitis, neg H pylori     HC UGI ENDOSCOPY, SIMPLE EXAM  01/20/2005    Esophagogastroduodenoscopy, colonoscopy with biopsies.  [facility]     HC UGI ENDOSCOPY, SIMPLE EXAM  7/1/2009    [facility], [place]     HC UGI ENDOSCOPY, SIMPLE EXAM  11/11/2009    attempted upper GI, pt. could not tolerate procedure:[facility]     ORTHOPEDIC SURGERY  October 19,2011    diskectomy L4-L5       Family History:    Family History   Problem Relation Age of Onset     GASTROINTESTINAL DISEASE Brother      severe Crohn's     Neurologic Disorder Brother      Seizures post head injury     Depression Brother      Substance Abuse Brother      Genitourinary Problems Father      kidney stones     DIABETES Father      HEART DISEASE Father      Open heart surgery     Breast Cancer Maternal Grandmother      Parkinsonism Maternal Grandmother      CEREBROVASCULAR DISEASE Paternal Grandmother      CANCER Maternal Grandfather      Lung     Cardiovascular Paternal Grandfather      Heart Attack     Substance Abuse Mother      Lung Cancer Paternal Uncle        Social History:  Marital Status:  Single [1]  Social History   Substance Use Topics     Smoking status: Current Every Day Smoker     Packs/day: 0.25     Types: Cigarettes     Smokeless tobacco: Never Used     Alcohol use 0.6 oz/week     1 Cans of beer per week      Comment: 1-2 drinks dominique        Medications:      [START ON 11/10/2017] diazepam (VALIUM) 5 MG tablet   polyethylene glycol (MIRALAX/GLYCOLAX) Packet   oxyCODONE (ROXICODONE) 10 MG IR tablet   naloxone (NARCAN) nasal spray   fentaNYL (DURAGESIC) 25 mcg/hr 72 hr patch   NONFORMULARY   promethazine (PHENERGAN) 6.25 MG/5ML syrup    diphenoxylate-atropine (LOMOTIL) 2.5-0.025 MG per tablet   cetirizine (ZYRTEC) 10 MG tablet   gabapentin (NEURONTIN) 300 MG capsule   cyclobenzaprine (FLEXERIL) 10 MG tablet   DULoxetine (CYMBALTA) 60 MG EC capsule   dexlansoprazole (DEXILANT) 60 MG CPDR CR capsule   albuterol (PROAIR HFA/PROVENTIL HFA/VENTOLIN HFA) 108 (90 BASE) MCG/ACT Inhaler   dicyclomine (BENTYL) 20 MG tablet   senna-docusate (SENOKOT-S;PERICOLACE) 8.6-50 MG per tablet   albuterol (2.5 MG/3ML) 0.083% neb solution   order for DME   ARIPiprazole (ABILIFY) 5 MG tablet   meclizine (ANTIVERT) 12.5 MG tablet   ondansetron (ZOFRAN ODT) 4 MG ODT tab   rizatriptan (MAXALT-MLT) 5 MG ODT tab   medroxyPROGESTERone (DEPO-PROVERA) 150 MG/ML injection   cetirizine (ZYRTEC) 10 MG tablet   medical cannabis inhalation (Patient's own supply.  Not a prescription)   EPINEPHrine (EPIPEN) 0.3 MG/0.3ML injection   Misc Natural Products (FLEX-A-MIN JOINT FLEX PO)   Multiple Vitamin (MULTI-VITAMIN) per tablet         Review of Systems   Constitutional: Positive for fatigue. Negative for chills and fever.   Skin: Positive for wound (small dehisced wound at coccygeal incision site).   Neurological: Positive for weakness.   All other systems reviewed and are negative.      Physical Exam   BP: 104/71  Pulse: 93  Temp: 98.1  F (36.7  C)  Resp: 18  Weight: 72.6 kg (160 lb)  SpO2: 97 %      Physical Exam   Constitutional: She is oriented to person, place, and time. She appears well-developed and well-nourished. No distress.   HENT:   Head: Normocephalic and atraumatic.   Nose: Nose normal.   Mouth/Throat: Oropharynx is clear and moist. No oropharyngeal exudate.   Eyes: Conjunctivae are normal. Pupils are equal, round, and reactive to light. Right eye exhibits no discharge. Left eye exhibits no discharge.   Neck: Normal range of motion. Neck supple.   Cardiovascular: Normal rate, regular rhythm and normal heart sounds.    No murmur heard.  Pulmonary/Chest: Effort normal and  breath sounds normal. No respiratory distress. She has no wheezes. She has no rales. She exhibits no tenderness.   Abdominal: Soft. Bowel sounds are normal. She exhibits no distension and no mass. There is no tenderness. There is no rebound and no guarding.   Musculoskeletal:   Tailbone area examined with the RN present in the room. She has a very small 5 mm opening to the distal end of her incision. No surrounding erythema. No drainage noted. I cannot express any drainage from the area. She is tender to palpation in the area. No evidence for abscess.   Lymphadenopathy:     She has no cervical adenopathy.   Neurological: She is alert and oriented to person, place, and time.   Skin: Skin is warm and dry. She is not diaphoretic.   Psychiatric: She has a normal mood and affect. Her behavior is normal.   Nursing note and vitals reviewed.      ED Course     ED Course     Procedures               Critical Care time:  none               No results found. However, due to the size of the patient record, not all encounters were searched. Please check Results Review for a complete set of results.    Medications   oxyCODONE (ROXICODONE) IR tablet 10 mg (10 mg Oral Given 10/26/17 2102)       Assessments & Plan (with Medical Decision Making)  Postoperative wound dehiscence, initial encounter     Katy is a 27 year old female who presents to the ED with post-operative concerns. The patient had a coccygeal resection on 10/18 and was seen yesterday with a small wound dehiscence at the incision site. Today, she reports increased yellow drainage from the incision. She is afebrile with normal vitals upon presentation to the ED. On exam the distal end of her incision has opened up. There is no evidence for abscess or underlying infection. Postoperative tenderness is noted. Discussed with the patient that there is no possible way to close this up again and she will have to allow it to heal on its own by secondary intention.  I had a long  discussion with her on how to perform saline wet-to-dry dressings. She inquired about pain medication. She has oxycodone and OxyContin at home. I offered to give her a dose of her oxycodone. She did not feel that this would work. She was requesting IV Dilaudid. I did not feel that this was appropriate given her presenting concern. I offered her 10 mg of p.o. oxycodone, but she declined. The patient left the ED prior to getting her wound dressed. She walked out, but the nurse was able to give her the discharge instructions as she was walking away. Hopefully, she will all through with the saline wet-to-dry dressings and we did discuss during my conversation with her. Given that she did not stay in the ED for the dressing to be placed, I am somewhat concerned regarding possible drug-seeking behavior. If she is having increased pain in the surgical region, she should see her surgeon for a repeat evaluation.      I have reviewed the nursing notes.    I have reviewed the findings, diagnosis, plan and need for follow up with the patient.       Discharge Medication List as of 10/26/2017  8:57 PM          Final diagnoses:   Postoperative wound dehiscence, initial encounter     This document serves as a record of services personally performed by Tomas De La Paz PA. It was created on their behalf by Lanette Arevalo, a trained medical scribe. The creation of this record is based on the provider's personal observations and the statements of the patient. This document has been checked and approved by the attending provider.    Note: Chart documentation done in part with Dragon Voice Recognition software. Although reviewed after completion, some word and grammatical errors may remain.    10/26/2017   Tomas De La Paz PA-C   [facility] EMERGENCY DEPARTMENT     Tomas De La Paz PA-C  10/30/17 1958

## 2017-10-27 NOTE — TELEPHONE ENCOUNTER
Can't advised anything else besides calling her surgeon and going to the ED    Here is the number she should be calling [phone]        Zach Rosario PA-C  [facility]

## 2017-10-27 NOTE — TELEPHONE ENCOUNTER
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 No available appointments today, though.  Abimbola Castro, CMA

## 2017-10-30 ENCOUNTER — MYC MEDICAL ADVICE (OUTPATIENT)
Dept: FAMILY MEDICINE | Facility: OTHER | Age: 27
End: 2017-10-30

## 2017-10-30 RX ORDER — DIAZEPAM 5 MG
2.5-5 TABLET ORAL EVERY 12 HOURS PRN
Qty: 60 TABLET | Refills: 2 | Status: SHIPPED | OUTPATIENT
Start: 2017-11-10 | End: 2018-01-29

## 2017-10-30 NOTE — TELEPHONE ENCOUNTER
"See encounter from 10/26/17, message from Sd and myself    \"Please let patient know we will not be able to fill until 11/11/17 due to insurance since she picked up #60 on 10/14/17.\"       I have signed RX with 2 refills dated 11/10/17 since 11/11 is a Saturday     Zach Rosario PA-C  [facility]         Provider notes:   Script from 8/31 - filled that day, 2 refills, filled 9/18 and 10/14   Script dated 9/19/17 was never filled as this was the one from her stolen issues in [place].   "

## 2017-10-30 NOTE — TELEPHONE ENCOUNTER
Pending Prescriptions:                       Disp   Refills    diazepam (VALIUM) 5 MG tablet             60 tab*3            Sig: Take 0.5-1 tablets (2.5-5 mg) by mouth every 12           hours as needed for anxiety or sleep (MUSCLE           SP[facility]) (one month supply)    Last ov 10/24/2017    Routing refill request to provider for review/approval because:  Drug not on the [facility] refill protocol     Iggy Bernstein, RN, BSN

## 2017-10-31 ENCOUNTER — OFFICE VISIT (OUTPATIENT)
Dept: FAMILY MEDICINE | Facility: OTHER | Age: 27
End: 2017-10-31
Payer: MEDICARE

## 2017-10-31 VITALS
RESPIRATION RATE: 16 BRPM | TEMPERATURE: 97.3 F | HEART RATE: 80 BPM | SYSTOLIC BLOOD PRESSURE: 110 MMHG | BODY MASS INDEX: 27.84 KG/M2 | DIASTOLIC BLOOD PRESSURE: 70 MMHG | WEIGHT: 152.2 LBS

## 2017-10-31 DIAGNOSIS — G89.29 CHRONIC BILATERAL LOW BACK PAIN WITH LEFT-SIDED SCIATICA: Primary | ICD-10-CM

## 2017-10-31 DIAGNOSIS — S32.2XXK CLOSED FRACTURE OF COCCYX WITH NONUNION, SUBSEQUENT ENCOUNTER: ICD-10-CM

## 2017-10-31 DIAGNOSIS — M54.42 CHRONIC BILATERAL LOW BACK PAIN WITH LEFT-SIDED SCIATICA: Primary | ICD-10-CM

## 2017-10-31 DIAGNOSIS — G89.4 CHRONIC PAIN SYNDROME: ICD-10-CM

## 2017-10-31 DIAGNOSIS — M51.16 LUMBAR DISC DISEASE WITH RADICULOPATHY: ICD-10-CM

## 2017-10-31 DIAGNOSIS — Z98.1 S/P LUMBAR FUSION: ICD-10-CM

## 2017-10-31 PROCEDURE — 99214 OFFICE O/P EST MOD 30 MIN: CPT | Performed by: PHYSICIAN ASSISTANT

## 2017-10-31 RX ORDER — FENTANYL 25 UG/1
1 PATCH TRANSDERMAL
Qty: 4 PATCH | Refills: 0 | Status: CANCELLED | OUTPATIENT
Start: 2017-10-31

## 2017-10-31 RX ORDER — OXYCODONE HYDROCHLORIDE 10 MG/1
10 TABLET ORAL EVERY 4 HOURS PRN
Qty: 84 TABLET | Refills: 0 | Status: SHIPPED | OUTPATIENT
Start: 2017-10-31 | End: 2017-11-13

## 2017-10-31 RX ORDER — CEPHALEXIN 500 MG/1
CAPSULE ORAL
COMMUNITY
End: 2017-12-21

## 2017-10-31 NOTE — NURSING NOTE
"Chief Complaint   Patient presents with     RECHECK     Pain     Panel Management     flu       Initial /70 (BP Location: Left arm, Patient Position: Chair, Cuff Size: Adult Regular)  Pulse 80  Temp 97.3  F (36.3  C) (Temporal)  Resp 16  Wt 152 lb 3.2 oz (69 kg)  BMI 27.84 kg/m2 Estimated body mass index is 27.84 kg/(m^2) as calculated from the following:    Height as of 10/25/17: 5' 2\" (1.575 m).    Weight as of this encounter: 152 lb 3.2 oz (69 kg).  Medication Reconciliation: complete     Isabel Turcios, [facility]  October 31, 2017      "

## 2017-10-31 NOTE — TELEPHONE ENCOUNTER
Discussed during visit today.    Zach Rosario PA-C  [facility]

## 2017-10-31 NOTE — MR AVS SNAPSHOT
After Visit Summary   10/31/2017    Katy Islas    MRN: [id number]           Patient Information     Date Of Birth          1990        Visit Information        Provider Department      10/31/2017 9:00 AM Aura Rosario PA-C; [facility]        Today's Diagnoses     Chronic bilateral low back pain with left-sided sciatica    -  1    Lumbar disc disease with radiculopathy        Closed fracture of coccyx with nonunion, subsequent encounter        S/P lumbar fusion        Need for prophylactic vaccination and inoculation against influenza        Chronic pain syndrome          Care Instructions    - Recheck 2 weeks           Follow-ups after your visit        Additional Services     PHYSICAL THERAPY REFERRAL       [facility]  Clinic  [address]   [place] [address]  Phone: [phone]        *This therapy referral will be filtered to a centralized scheduling office at [facility] and the patient will receive a call to schedule an appointment at a [place] location most convenient for them. *     [facility] provides Physical Therapy evaluation and treatment and many specialty services across the [place] system.  If requesting a specialty program, please choose from the list below.    If you have not heard from the scheduling office within 2 business days, please call [phone] for all locations, with the exception of [place], please call [phone].  Treatment: Evaluation & Treatment  Special Instructions/Modalities: Water therapy   Special Programs: None     Please be aware that coverage of these services is subject to the terms and limitations of your health insurance plan.  Call member services at your health plan with any benefit or coverage questions.      **Note to Provider:  If you are referring outside of [place] for the therapy appointment, please  "list the name of the location in the \"special instructions\" above, print the referral and give to the patient to schedule the appointment.                  Who to contact     If you have questions or need follow up information about today's clinic visit or your schedule please contact [facility] directly at [phone].  Normal or non-critical lab and imaging results will be communicated to you by MyChart, letter or phone within 4 business days after the clinic has received the results. If you do not hear from us within 7 days, please contact the clinic through [employer] or phone. If you have a critical or abnormal lab result, we will notify you by phone as soon as possible.  Submit refill requests through [employer] or call your pharmacy and they will forward the refill request to us. Please allow 3 business days for your refill to be completed.          Additional Information About Your Visit        MyChart Information     [employer] gives you secure access to your electronic health record. If you see a primary care provider, you can also send messages to your care team and make appointments. If you have questions, please call your primary care clinic.  If you do not have a primary care provider, please call [phone] and they will assist you.        Care EveryWhere ID     This is your Care EveryWhere ID. This could be used by other organizations to access your [place] medical records  [id number]        Your Vitals Were     Pulse Temperature Respirations Last Period BMI (Body Mass Index)       80 97.3  F (36.3  C) (Temporal) 16 10/29/2017 27.84 kg/m2        Blood Pressure from Last 3 Encounters:   10/31/17 110/70   10/26/17 104/71   10/25/17 132/69    Weight from Last 3 Encounters:   10/31/17 152 lb 3.2 oz (69 kg)   10/26/17 160 lb (72.6 kg)   10/25/17 155 lb (70.3 kg)              We Performed the Following     PHYSICAL THERAPY REFERRAL          Today's Medication Changes          These changes are " accurate as of: 10/31/17  9:24 AM.  If you have any questions, ask your nurse or doctor.               These medicines have changed or have updated prescriptions.        Dose/Directions    oxyCODONE 10 MG IR tablet   Commonly known as:  ROXICODONE   This may have changed:    - reasons to take this  - additional instructions   Used for:  Chronic pain syndrome, Chronic bilateral low back pain with left-sided sciatica, S/P lumbar fusion, Lumbar disc disease with radiculopathy   Changed by:  Aura Rosario PA-C        Dose:  10 mg   Take 1 tablet (10 mg) by mouth every 4 hours as needed maximum 6 tablet(s) per day   Quantity:  84 tablet   Refills:  0            Where to get your medicines      Some of these will need a paper prescription and others can be bought over the counter.  Ask your nurse if you have questions.     Bring a paper prescription for each of these medications     oxyCODONE 10 MG IR tablet                Primary Care Provider Office Phone # Fax #    Aura Rosario PA-C [phone]       [address] [place] [id number]        Equal Access to Services     [facility]: Hadii pilo bishop hadasho Soyvette, waaxda luqadaha, qaybta kaalmada ellyn, susan santo . So [facility] [phone].    ATENCIÓN: Si habla español, tiene a kerr disposición servicios gratuitos de asistencia lingüística. [place]per al [phone].    We comply with applicable federal civil rights laws and [place] laws. We do not discriminate on the basis of race, color, national origin, age, disability, sex, sexual orientation, or gender identity.            Thank you!     Thank you for choosing [facility]  for your care. Our goal is always to provide you with excellent care. Hearing back from our patients is one way we can continue to improve our services. Please take a few minutes to complete the written survey that you may receive in the mail  after your visit with us. Thank you!             Your Updated Medication List - Protect others around you: Learn how to safely use, store and throw away your medicines at www.disposemymeds.org.          This list is accurate as of: 10/31/17  9:24 AM.  Always use your most recent med list.                   Brand Name Dispense Instructions for use Diagnosis    * albuterol 108 (90 BASE) MCG/ACT Inhaler    PROAIR HFA/PROVENTIL HFA/VENTOLIN HFA    1 Inhaler    Inhale 2 puffs into the lungs every 6 hours as needed for shortness of breath / dyspnea or wheezing    Mild persistent asthma without complication       * albuterol (2.5 MG/3ML) 0.083% neb solution     50 vial    Take 1 vial (2.5 mg) by nebulization every 6 hours as needed for shortness of breath / dyspnea or wheezing    Mild persistent asthma without complication       ARIPiprazole 5 MG tablet    ABILIFY    30 tablet    Take 1 tablet (5 mg) by mouth At Bedtime    Adjustment disorder with mixed anxiety and depressed mood, Anxiety       cephALEXin 500 MG capsule    KEFLEX          * cetirizine 10 MG tablet    zyrTEC     Take 10 mg by mouth daily        * cetirizine 10 MG tablet    zyrTEC    90 tablet    Take 1 tablet (10 mg) by mouth every evening    Chronic seasonal allergic rhinitis due to pollen       cyclobenzaprine 10 MG tablet    FLEXERIL    60 tablet    Take 2 tablets (20 mg) by mouth nightly as needed for muscle spasms or other (back pain)    Chronic bilateral low back pain with left-sided sciatica       dexlansoprazole 60 MG Cpdr CR capsule    DEXILANT    30 capsule    Take 1 capsule (60 mg) by mouth daily    Gastroesophageal reflux disease without esophagitis       diazepam 5 MG tablet   Start taking on:  11/10/2017    VALIUM    60 tablet    Take 0.5-1 tablets (2.5-5 mg) by mouth every 12 hours as needed for anxiety or sleep (MUSCLE SPASM) (one month supply)    Chronic pain syndrome, Chronic bilateral low back pain with left-sided sciatica, Lumbar disc  disease with radiculopathy, S/P lumbar fusion       dicyclomine 20 MG tablet    BENTYL    30 tablet    Take 1 tablet (20 mg) by mouth 4 times daily as needed (ABDOMINAL CRAMPING) AS NEEDED FOR CRAMPING    Hx of Crohn's disease       diphenoxylate-atropine 2.5-0.025 MG per tablet    LOMOTIL    10 tablet    Take 1 tablet by mouth 4 times daily as needed for diarrhea        DULoxetine 60 MG EC capsule    CYMBALTA    30 capsule    Take 1 capsule (60 mg) by mouth daily    Adjustment disorder with mixed anxiety and depressed mood       EPINEPHrine 0.3 MG/0.3ML injection 2-pack    EPIPEN/ADRENACLICK/or ANY BX GENERIC EQUIV    0.6 mL    Inject 0.3 mLs (0.3 mg) into the muscle once as needed for anaphylaxis    Bee sting allergy       fentaNYL 25 mcg/hr 72 hr patch    DURAGESIC    4 patch    Place 1 patch onto the skin every 72 hours    Chronic bilateral low back pain with left-sided sciatica, Lumbar disc disease with radiculopathy, Closed fracture of coccyx with nonunion, subsequent encounter, S/P lumbar fusion       FLEX-A-MIN JOINT FLEX PO      Take by mouth daily Reported on 3/28/2017        gabapentin 300 MG capsule    NEURONTIN    90 capsule    Take 1 capsule (300 mg) by mouth 3 times daily    Chronic bilateral low back pain with left-sided sciatica       meclizine 12.5 MG tablet    ANTIVERT    30 tablet    Take 1 tablet (12.5 mg) by mouth 4 times daily as needed for dizziness    Dizziness       medical cannabis inhalation (Patient's own supply.  Not a prescription)      Inhale into the lungs 3 times daily as needed Reported on 3/28/2017        medroxyPROGESTERone 150 MG/ML injection    DEPO-PROVERA    3 mL    Inject 1 mL (150 mg) into the muscle every 3 months    Encounter for surveillance of injectable contraceptive, Dysmenorrhea       Multi-vitamin Tabs tablet   Generic drug:  multivitamin, therapeutic with minerals      Take 1 tablet by mouth daily Reported on 3/28/2017        naloxone nasal spray    NARCAN    0.2  mL    Spray 1 spray (4 mg) into one nostril alternating nostrils as needed for opioid reversal every 2-3 minutes until assistance arrives    Chronic pain syndrome, Chronic bilateral low back pain with left-sided sciatica, S/P lumbar fusion, Lumbar disc disease with radiculopathy       NONFORMULARY      Apply 30 mLs topically 4 times daily        ondansetron 4 MG ODT tab    ZOFRAN ODT    60 tablet    Take 1-2 tablets (4-8 mg) by mouth every 6 hours as needed for nausea Take 1-2 tablets by mouth as needed for nausea    Nausea       order for DME     1 each    Nebulizer with mask and tubing    Mild persistent asthma without complication       oxyCODONE 10 MG IR tablet    ROXICODONE    84 tablet    Take 1 tablet (10 mg) by mouth every 4 hours as needed maximum 6 tablet(s) per day    Chronic pain syndrome, Chronic bilateral low back pain with left-sided sciatica, S/P lumbar fusion, Lumbar disc disease with radiculopathy       polyethylene glycol Packet    MIRALAX/GLYCOLAX     Take 1 packet by mouth daily        promethazine 6.25 MG/5ML syrup    PHENERGAN    560 mL    Take 10 mLs (12.5 mg) by mouth 4 times daily as needed for nausea        rizatriptan 5 MG ODT tab    MAXALT-MLT    18 tablet    Take 1-2 tablets (5-10 mg) by mouth at onset of headache for migraine May repeat in 2 hours. Max 6 tablets/24 hours.    Migraine with aura and without status migrainosus, not intractable       senna-docusate 8.6-50 MG per tablet    SENOKOT-S;PERICOLACE    60 tablet    Take 1 tablet by mouth 2 times daily as needed for constipation    Drug-induced constipation       * Notice:  This list has 4 medication(s) that are the same as other medications prescribed for you. Read the directions carefully, and ask your doctor or other care provider to review them with you.

## 2017-11-01 ENCOUNTER — TELEPHONE (OUTPATIENT)
Dept: FAMILY MEDICINE | Facility: OTHER | Age: 27
End: 2017-11-01

## 2017-11-01 ENCOUNTER — MYC MEDICAL ADVICE (OUTPATIENT)
Dept: FAMILY MEDICINE | Facility: OTHER | Age: 27
End: 2017-11-01

## 2017-11-01 NOTE — TELEPHONE ENCOUNTER
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     Ines Roy, RN, BSN

## 2017-11-01 NOTE — TELEPHONE ENCOUNTER
Requested Provider:  EMILY Ware    PCP: Aura Rosario    Reason for visit: infection at her insicion site    Duration of symptoms: today    Have you been treated for this in the past? No    Additional comments: patient would like to see SMITHA Monson today. Please leave a message if she does not answer

## 2017-11-01 NOTE — TELEPHONE ENCOUNTER
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    Will also flag for RN to review/advise on tailbone incision.  Abimbola Castro, CMA

## 2017-11-01 NOTE — TELEPHONE ENCOUNTER
Katy Islas is a 27 year old female who calls with concerns of her tailbone wound.    NURSING ASSESSMENT:  Description:  Feeling very sore. Having drainage from her tailbone. Feels popping under the skin, yellowish pinkish. There is a whole in the incision that is really small, about a quart inch. Temperature last night was 98F and 99F today. Changing the pads on her pelvic bone 1-2 times per day to every hour is sitting on the wound. Pads are changed when the pad is medium saturated. Stated there is a bump under the skin that is very painful, and squishy. Denies fevers, severe bleeding, change in discharge appearance.   Onset/duration:  ongoing  Pain scale (0-10)   Still having pain, same as LOV  LMP/preg/breast feeding: Patient's last menstrual period was 10/29/2017.  Last exam/Treatment:  11/01/2017  Allergies:   Allergies   Allergen Reactions     Ativan [Lorazepam] Hives     At the IV site     Baclofen      hives     Bees Hives, Swelling and Difficulty breathing     Caffeine      Contrast Dye      Hives,   Updated 5/10/2016 CT Contrast.     Diazepam      IV form makes her become aggressive and angry     Iodine Hives     Methocarbamol Swelling     Metoclopramide      Other reaction(s): Tremors  LW Reaction: shaking/sweating     Midazolam      Other reaction(s): Agitation     Monosodium Glutamate      Morphine Other (See Comments)     Difficulty with urination     Nsaids Other (See Comments)     GI BLEED x2     Other (Do Not Use) Other (See Comments)     Xanaflex- pt becomes disoriented and loses bladder control     Percocet [Oxycodone-Acetaminophen] Itching     Reglan [Metoclopramide Hcl] Other (See Comments)     shaking     Soma [Carisoprodol] Visual Disturbance     Sleep walking     Tizanidine      Topamax Other (See Comments)     Topamax [Topiramate] Nausea     Tingling  GI/Vomit     Tramadol      Severe Headache, Seizure Risk     Tylenol W/Codeine [Acetaminophen-Codeine] Nausea and Itching     Tylenol 3      Valium Other (See Comments)     Becomes aggressive and angry     Versed Other (See Comments)     Zolmitriptan      Makes face feel like its twitching     Droperidol Anxiety     Flu Virus Vaccine Rash      Arm swelling     NURSING PLAN: Nursing advice to patient to follow up with surgeon if further concerns    RECOMMENDED DISPOSITION:  See above  Will comply with recommendation: Yes  If further questions/concerns or if symptoms do not improve, worsen or new symptoms develop, call your PCP or [facility] as soon as possible.    NOTES:  Disposition was determined by the first positive assessment question, therefore all previous assessment questions were negative    Guideline used:  Telephone Triage Protocols for Nurses, Fifth Edition, Telma Nievesgs  Wound Healthig and Infection  Nursing Judgment  Updated JM  Nohemi Jennings, RN, BSN

## 2017-11-02 NOTE — TELEPHONE ENCOUNTER
Please advise patient I am not comfortable prescribing any more pain medication than she already has.   She should get wound care advise from Dr. Millan.     Zach Rosario PA-C  [facility]

## 2017-11-02 NOTE — TELEPHONE ENCOUNTER
[employer] message sent. Will close encounter if no response after patient reads it.  Abimbola Castro, CMA

## 2017-11-05 ENCOUNTER — MYC MEDICAL ADVICE (OUTPATIENT)
Dept: FAMILY MEDICINE | Facility: OTHER | Age: 27
End: 2017-11-05

## 2017-11-06 NOTE — PROGRESS NOTES
"  SUBJECTIVE:                                                    Katy Islas is a 27 year old female who presents to clinic today for the following health issues:      HPI    Back Pain Follow Up      Description:   Location of pain:  tailbone  Character of pain: Cant describe pain at least an 8 or 9  Pain radiation: Does not radiate  Since last visit, pain is:  worsened  New numbness or weakness in legs, not attributed to pain:  no     Intensity: Currently 8-9/10    History:   Pain interferes with job: Not applicable  Therapies tried without relief:   Therapies tried with relief: none     - Patient destroyed all her oxycodone and fentanyl patches per visit on Friday, used black bag from pharmacy for safe disposal   - Mom verified witnessed this   - Wondering about something to numb area  - Wound care coming tomorrow with wound vac and will follow every few days   - Willing to try anything, can't take the pain anymore      Fentanyl worked in the past, Oxycodone gave a little relief, Dilaudid did nothing      Only tried MS Contin once in the past, made it hard to pee      Willing to try again         Plan from last visit with MAIA on Friday 11/9/17  I spoke with her PCP about pain medications to last the weekend and we both agreed that a short course of Dilaudid is reasonable given her continued severe postsurgical pain and wound infection. Will prescribe 20 tablets (has not been filled since 10/30 by her surgeon Dr. Millan) and she will follow up with her PCP on Monday for recheck. She was instructed to d/c the oxycodone while taking the Dilaudid to avoid opioid overdose.      She states her bandage felt wet so this was changed during her visit today. The packing was removed and initially replaced with 1\" iodoform packing but I reviewed chart and she has an iodine allergy so this was quickly removed and the wound was flushed with sterile water and regular 1/2\" packing was placed followed by gauze, Telfa and " Tegaderm. She states there will be a wound nurse coming to change it again tomorrow. She will continue with the clindamycin as prescribed in the ED yesterday and has follow up with wound care and her surgeon next week.          Problem list and histories reviewed & adjusted, as indicated.  Additional history: as documented    ROS:  Constitutional, HEENT, cardiovascular, pulmonary, gi and gu systems are negative, except as otherwise noted.      OBJECTIVE:   /78 (BP Location: Left arm, Patient Position: Chair, Cuff Size: Adult Regular)  Pulse 139  Temp 98.9  F (37.2  C) (Oral)  Resp 20  Wt 145 lb (65.8 kg)  LMP 10/29/2017  SpO2 99%  BMI 26.52 kg/m2  Body mass index is 26.52 kg/(m^2).  GENERAL APPEARANCE: patient laying on exam table, appears in pain but not distress   EYES: Eyes grossly normal to inspection, PERRLA, conjunctivae and sclerae without injection or discharge, EOM intact    BACK: Deferred   PSYCH: Alert and oriented x3; speech- coherent , normal rate and volume; able to articulate logical thoughts, able to abstract reason, no tangential thoughts, no hallucinations or delusions, mentation appears normal, Mood is euthymic. Affect is appropriate for this mood state and bright. Thought content is free of suicidal ideation, hallucinations, and delusions. Dress is adequate and upkept. Eye contact is good during conversation.       Diagnostic Test Results:  none     ASSESSMENT/PLAN:       ICD-10-CM    1. Postoperative pain G89.18    2. Postoperative wound dehiscence, subsequent encounter T81.31XD    3. S/P lumbar fusion Z98.1 oxyCODONE IR (ROXICODONE) 5 MG tablet     morphine (MS CONTIN) 15 MG 12 hr tablet   4. Closed fracture of coccyx with nonunion, subsequent encounter S32.2XXK    5. Lumbar disc disease with radiculopathy M51.16 oxyCODONE IR (ROXICODONE) 5 MG tablet     morphine (MS CONTIN) 15 MG 12 hr tablet   6. Chronic pain syndrome G89.4 oxyCODONE IR (ROXICODONE) 5 MG tablet     morphine (MS  CONTIN) 15 MG 12 hr tablet   7. Chronic bilateral low back pain with left-sided sciatica M54.42 oxyCODONE IR (ROXICODONE) 5 MG tablet    G89.29 morphine (MS CONTIN) 15 MG 12 hr tablet       - Patient with severe post surgical pain, with wound dishissence       2 surgeries in 2017, many ED visits and work in requests for pain     - Pain not repsonsive to Dilaudid (Hydromorphone) 2 mg, 6/day, total dose 12 mg, MME 48 mg  - Many allergies to pain medications along with severe opioid tolerance   - Has wound care services  - Continues on Clindamycin   - Has follow up with surgeon on 11/17/17 - Dr. Jeniffer Eugene with [facility]     - Patient complains of 9-10/10 pain constant, has severe opioid tolerance   - Will give her enough to get to surgeon apt, discussed he should be the one to follow and treat her pain, this is beyond my field of expertise   - I would like her to get further pain medication from her surgeon or the pain clinic, discussed getting in early with the pain clinic (previously went to [facility] but just for [place] physical therapy) could be helpful in managing her pain and eventually tapering off   - Patient states she is also looking into medical marijuana, has already registered with the state, she will work on pursuing this, states her surgeon is certified in this and prefers this over pain medications     - MS morphine 15 mg - twice a day, total 30 mg per day, MME 30 mg (quantity 10)    Oxycodone 5 mg, 0-4 tablets AS NEEDED FOR BREAK THROUGH PAIN, 0 - 20 mg per day for break through, MME 0-78  (quantity 18)     Total daily dosing - MME 30 mg to 108 mg   - Has Narcan at home and is aware of how to use   -  reviewed, as expected   - I also spoke with pharmacy, lidocaine patch absorption would not likely give her deep pain relief in her wound, I am not willing to try creams due to the wound being open and packed     The patient and her mother indicates understanding of these  issues and agrees with the plan.    Follow up: with surgeon     Due to language barrier, an  was present during the history-taking and subsequent discussion (and for part of the physical exam) with this patient.      A total of 50 minutes was spent with the patient today, with greater than 50% of the visit involving counseling and coordination of care.        Aura Rosario PA-C  [facility]

## 2017-11-07 NOTE — TELEPHONE ENCOUNTER
[employer] message read on 11/6/2017at 10:33 AM by Katy Islas. No response received at this time. Nohemi Jennings, RN, BSN

## 2017-11-08 NOTE — PROGRESS NOTES
"  SUBJECTIVE:                                                    Katy Islas is a 27 year old female who presents to clinic today for the following health issues:      HPI    ED/UC Followup:    Facility:  [facility]  Date of visit: 11/08/2017  Reason for visit: Chronic Pain - Wound Infection  Current Status: Patient is in an \"extreme amount of pain.\" She is laying on her side on the exam table. She states it is 8/10 pain.       She underwent coccygeal resection on 10/18 and has been experiencing severe postoperative pain ever since. She presented to [facility] ED last night due to discharge from the wound and states the wound was packed and she was set up with home care for dressing changes along with a wound care visit and surgery follow up next week. She continues to describe severe pain despite oxycodone 10 mg every 4 hours. She has also tried a Fentanyl patch without benefit. She follows with Zach Rosario for all of her pain medication but she is out of the office until Monday so is wondering what to do about her pain until then. She states Dilaudid has worked well in the past.     ER note: \"Pt here with foul discharge coming from her surgical wound. Surgery done on 10/18/2017, with Dr Venegas at [facility]. They did a revision of a coccyx fracture that was pressing on a nerve. She has been on pain medications for a long time, in large doses, Hydromorphone added after surgery. She takes oxycodone daily for chronic pain issues. Pt states pain is out of control, She has been seen in other Ed for pain control concerns since her surgery and in her doctors office for primary care. Last filled rx 10/31/2017 for her oxydodone and 10/30/2017 for her hydrocodone. She has plenty of narcotic at home for her pain control.     History provided by: Patient  Wound Check   Treated in ED: not treated in this ed. surgery on 10/28/2017. Treatments tried: pain control. no previous " "antibiotic. Fever duration: no fever. There has been colored discharge from the wound. There is no redness present. There is no swelling present. The pain has worsened. \"    Course:wound culture obtained. Iv started and dilaudid 1 mg for pain given. Clindamycin 600 mg ivpb given for infection. Discussed case with her surgeon DR Millan, he is in agreement with plan, wound like for pt to receive clindamycin 600 mg tid oral at home. See him in a week. Pt will set up this appt. Next pt was seen by DR Pruett, wound care physician. Will pack wound with melgisorb and cover with miraplex. They will see pt in clinic on Tuesday Nov 14 at 0800. Wound care will set pt up with home care for dressing changes. Pt is to use home medications for pain control. Recommended she take med 30 minutes prior to dressing changes.       Problem list and histories reviewed & adjusted, as indicated.  Additional history: none    ROS:  GENERAL: Denies fever, fatigue, weakness, weight gain, or weight loss.  CARDIOVASCULAR: Denies chest pain, shortness of breath, irregular heartbeats,  palpitations, or edema.  RESPIRATORY: Denies cough, hemoptysis, and shortness of breath.  MUSCULOSKELETAL: Severe tailbone/postoperiatve pain.   SKIN: +Driainage from tailbone wound.   NEUROLOGIC: Denies headache, fainting, dizziness, memory loss, numbness, tingling, or seizures.    OBJECTIVE:     /84  Pulse 138  Temp (P) 97.4  F (36.3  C)  Resp 20  Wt 146 lb (66.2 kg)  LMP 10/29/2017  SpO2 100%  BMI 26.7 kg/m2  Body mass index is 26.7 kg/(m^2).  GENERAL: healthy, appears uncomfortable and in pain  RESP: lungs clear to auscultation - no rales, rhonchi or wheezes  CV: regular rate and rhythm, normal S1 S2, no S3 or S4, no murmur, click or rub  MS: Tenderness over sacral/coccygeal area.  SKIN: Bandage removed and there is a ~3-4 cm wide and ~3-4 cm deep opening with purulent drainage and packing material over the coccygeal area.  Tenderness to palpation. " "    ASSESSMENT/PLAN:       ICD-10-CM    1. Postoperative pain G89.18 HYDROmorphone (DILAUDID) 2 MG tablet   2. Postoperative wound infection, initial encounter T81.4XXA HYDROmorphone (DILAUDID) 2 MG tablet   3. Closed fracture of coccyx with nonunion, subsequent encounter S32.2XXK HYDROmorphone (DILAUDID) 2 MG tablet       I spoke with her PCP about pain medications to last the weekend and we both agreed that a short course of Dilaudid is reasonable given her continued severe postsurgical pain and wound infection. Will prescribe 20 tablets (has not been filled since 10/30 by her surgeon Dr. Millan) and she will follow up with her PCP on Monday for recheck. She was instructed to d/c the oxycodone while taking the Dilaudid to avoid opioid overdose.     She states her bandage felt wet so this was changed during her visit today. The packing was removed and initially replaced with 1\" iodoform packing but I reviewed chart and she has an iodine allergy so this was quickly removed and the wound was flushed with sterile water and regular 1/2\" packing was placed followed by gauze, Telfa and Tegaderm. She states there will be a wound nurse coming to change it again tomorrow. She will continue with the clindamycin as prescribed in the ED yesterday and has follow up with wound care and her surgeon next week.       Bob Monson PA-C  [facility]  "

## 2017-11-09 ENCOUNTER — MYC MEDICAL ADVICE (OUTPATIENT)
Dept: FAMILY MEDICINE | Facility: OTHER | Age: 27
End: 2017-11-09

## 2017-11-09 ENCOUNTER — OFFICE VISIT (OUTPATIENT)
Dept: FAMILY MEDICINE | Facility: OTHER | Age: 27
End: 2017-11-09
Payer: MEDICARE

## 2017-11-09 VITALS
HEART RATE: 138 BPM | DIASTOLIC BLOOD PRESSURE: 84 MMHG | BODY MASS INDEX: 26.7 KG/M2 | WEIGHT: 146 LBS | OXYGEN SATURATION: 100 % | SYSTOLIC BLOOD PRESSURE: 130 MMHG | RESPIRATION RATE: 20 BRPM

## 2017-11-09 DIAGNOSIS — G89.18 POSTOPERATIVE PAIN: Primary | ICD-10-CM

## 2017-11-09 DIAGNOSIS — S32.2XXK CLOSED FRACTURE OF COCCYX WITH NONUNION, SUBSEQUENT ENCOUNTER: ICD-10-CM

## 2017-11-09 PROCEDURE — 99214 OFFICE O/P EST MOD 30 MIN: CPT | Performed by: PHYSICIAN ASSISTANT

## 2017-11-09 RX ORDER — HYDROMORPHONE HYDROCHLORIDE 2 MG/1
2 TABLET ORAL EVERY 4 HOURS PRN
Qty: 20 TABLET | Refills: 0 | Status: SHIPPED | OUTPATIENT
Start: 2017-11-09 | End: 2017-11-13

## 2017-11-09 ASSESSMENT — PAIN SCALES - GENERAL: PAINLEVEL: EXTREME PAIN (8)

## 2017-11-09 NOTE — PATIENT INSTRUCTIONS
Will provide a 1 time fill of Dilaudid to take 1 tablet every 4 hours as needed.  Do not take the oxycodone while on this.  Continue to follow with the wound care specialist and your surgeon.    Follow up with Zach on Monday.

## 2017-11-09 NOTE — NURSING NOTE
"Chief Complaint   Patient presents with     Pain     Panel Management     flu       Initial BP (!) 151/95 (BP Location: Right arm, Patient Position: Chair, Cuff Size: Adult Regular)  Pulse 138  Temp (P) 97.4  F (36.3  C)  Resp 20  Wt 146 lb (66.2 kg)  LMP 10/29/2017  SpO2 100%  BMI 26.7 kg/m2 Estimated body mass index is 26.7 kg/(m^2) as calculated from the following:    Height as of 10/25/17: 5' 2\" (1.575 m).    Weight as of this encounter: 146 lb (66.2 kg).  Medication Reconciliation: complete     Abimbola Castro CMA      "

## 2017-11-09 NOTE — MR AVS SNAPSHOT
After Visit Summary   11/9/2017    Katy Islas    MRN: [id number]           Patient Information     Date Of Birth          1990        Visit Information        Provider Department      11/9/2017 3:00 PM Bob Monson PA-C; [facility]        Today's Diagnoses     Postoperative pain    -  1    Postoperative wound infection, initial encounter        Closed fracture of coccyx with nonunion, subsequent encounter          Care Instructions    Will provide a 1 time fill of Dilaudid to take 1 tablet every 4 hours as needed.  Do not take the oxycodone while on this.  Continue to follow with the wound care specialist and your surgeon.    Follow up with Zach on Monday.              Follow-ups after your visit        Your next 10 appointments already scheduled     Nov 13, 2017 10:30 AM CST   [facility]    [address] [place] [id number]   [phone]           Bring a current list of meds and any records pertaining to this visit. For Physicals, please bring immunization records and any forms needing to be filled out. Please arrive 10 minutes early to complete paperwork.              Who to contact     If you have questions or need follow up information about today's clinic visit or your schedule please contact [facility] directly at [phone].  Normal or non-critical lab and imaging results will be communicated to you by MyChart, letter or phone within 4 business days after the clinic has received the results. If you do not hear from us within 7 days, please contact the clinic through MyChart or phone. If you have a critical or abnormal lab result, we will notify you by phone as soon as possible.  Submit refill requests through [employer] or call your pharmacy and they will forward the refill request to us. Please allow 3 business days for  your refill to be completed.          Additional Information About Your Visit        [facility] Information     [employer] gives you secure access to your electronic health record. If you see a primary care provider, you can also send messages to your care team and make appointments. If you have questions, please call your primary care clinic.  If you do not have a primary care provider, please call [phone] and they will assist you.        Care EveryWhere ID     This is your Care EveryWhere ID. This could be used by other organizations to access your [facility] medical records  [id number]        Your Vitals Were     Pulse Respirations Last Period Pulse Oximetry BMI (Body Mass Index)       138 20 10/29/2017 100% 26.7 kg/m2        Blood Pressure from Last 3 Encounters:   11/09/17 (!) 151/95   10/31/17 110/70   10/26/17 104/71    Weight from Last 3 Encounters:   11/09/17 146 lb (66.2 kg)   10/31/17 152 lb 3.2 oz (69 kg)   10/26/17 160 lb (72.6 kg)              Today, you had the following     No orders found for display         Today's Medication Changes          These changes are accurate as of: 11/9/17  3:51 PM.  If you have any questions, ask your nurse or doctor.               Start taking these medicines.        Dose/Directions    HYDROmorphone 2 MG tablet   Commonly known as:  DILAUDID   Used for:  Postoperative pain, Closed fracture of coccyx with nonunion, subsequent encounter, Postoperative wound infection, initial encounter   Started by:  Bob Monson PA-C        Dose:  2 mg   Take 1 tablet (2 mg) by mouth every 4 hours as needed for pain maximum 6 tablet(s) per day   Quantity:  20 tablet   Refills:  0            Where to get your medicines      Some of these will need a paper prescription and others can be bought over the counter.  Ask your nurse if you have questions.     Bring a paper prescription for each of these medications     HYDROmorphone 2 MG tablet                Primary Care Provider  Office Phone # Fax #    Aura DIAZ LOVE Rosario [phone]       [address] [place] [address] [address]  [facility] [address]        Equal Access to Services     VIKY VILLANUEVA : Hadii aad ku hadpeteo Soomaali, waaxda luqadaha, qaybta kaalmada adeegyada, susan calebin hayaapercy tomlinluis mlakshmi turner. So [facility] [phone].    ATENCIÓN: Si habla español, tiene a kerr disposición servicios gratuitos de asistencia lingüística. Llame al [phone].    We comply with applicable federal civil rights laws and [place] laws. We do not discriminate on the basis of race, color, national origin, age, disability, sex, sexual orientation, or gender identity.            Thank you!     Thank you for choosing [facility]  for your care. Our goal is always to provide you with excellent care. Hearing back from our patients is one way we can continue to improve our services. Please take a few minutes to complete the written survey that you may receive in the mail after your visit with us. Thank you!             Your Updated Medication List - Protect others around you: Learn how to safely use, store and throw away your medicines at www.disposemymeds.org.          This list is accurate as of: 11/9/17  3:51 PM.  Always use your most recent med list.                   Brand Name Dispense Instructions for use Diagnosis    * albuterol 108 (90 BASE) MCG/ACT Inhaler    PROAIR HFA/PROVENTIL HFA/VENTOLIN HFA    1 Inhaler    Inhale 2 puffs into the lungs every 6 hours as needed for shortness of breath / dyspnea or wheezing    Mild persistent asthma without complication       * albuterol (2.5 MG/3ML) 0.083% neb solution     50 vial    Take 1 vial (2.5 mg) by nebulization every 6 hours as needed for shortness of breath / dyspnea or wheezing    Mild persistent asthma without complication       ARIPiprazole 5 MG tablet    ABILIFY    30 tablet    Take 1 tablet (5 mg) by mouth At Bedtime    Adjustment disorder with mixed anxiety and  depressed mood, Anxiety       cephALEXin 500 MG capsule    KEFLEX          * cetirizine 10 MG tablet    zyrTEC     Take 10 mg by mouth daily        * cetirizine 10 MG tablet    zyrTEC    90 tablet    Take 1 tablet (10 mg) by mouth every evening    Chronic seasonal allergic rhinitis due to pollen       cyclobenzaprine 10 MG tablet    FLEXERIL    60 tablet    Take 2 tablets (20 mg) by mouth nightly as needed for muscle spasms or other (back pain)    Chronic bilateral low back pain with left-sided sciatica       dexlansoprazole 60 MG Cpdr CR capsule    DEXILANT    30 capsule    Take 1 capsule (60 mg) by mouth daily    Gastroesophageal reflux disease without esophagitis       diazepam 5 MG tablet   Start taking on:  11/10/2017    VALIUM    60 tablet    Take 0.5-1 tablets (2.5-5 mg) by mouth every 12 hours as needed for anxiety or sleep (MUSCLE SPASM) (one month supply)    Chronic pain syndrome, Chronic bilateral low back pain with left-sided sciatica, Lumbar disc disease with radiculopathy, S/P lumbar fusion       dicyclomine 20 MG tablet    BENTYL    30 tablet    Take 1 tablet (20 mg) by mouth 4 times daily as needed (ABDOMINAL CRAMPING) AS NEEDED FOR CRAMPING    Hx of Crohn's disease       diphenoxylate-atropine 2.5-0.025 MG per tablet    LOMOTIL    10 tablet    Take 1 tablet by mouth 4 times daily as needed for diarrhea        DULoxetine 60 MG EC capsule    CYMBALTA    30 capsule    Take 1 capsule (60 mg) by mouth daily    Adjustment disorder with mixed anxiety and depressed mood       EPINEPHrine 0.3 MG/0.3ML injection 2-pack    EPIPEN/ADRENACLICK/or ANY BX GENERIC EQUIV    0.6 mL    Inject 0.3 mLs (0.3 mg) into the muscle once as needed for anaphylaxis    Bee sting allergy       FLEX-A-MIN JOINT FLEX PO      Take by mouth daily Reported on 3/28/2017        gabapentin 300 MG capsule    NEURONTIN    90 capsule    Take 1 capsule (300 mg) by mouth 3 times daily    Chronic bilateral low back pain with left-sided  sciatica       HYDROmorphone 2 MG tablet    DILAUDID    20 tablet    Take 1 tablet (2 mg) by mouth every 4 hours as needed for pain maximum 6 tablet(s) per day    Postoperative pain, Closed fracture of coccyx with nonunion, subsequent encounter, Postoperative wound infection, initial encounter       meclizine 12.5 MG tablet    ANTIVERT    30 tablet    Take 1 tablet (12.5 mg) by mouth 4 times daily as needed for dizziness    Dizziness       medical cannabis inhalation (Patient's own supply.  Not a prescription)      Inhale into the lungs 3 times daily as needed Reported on 3/28/2017        medroxyPROGESTERone 150 MG/ML injection    DEPO-PROVERA    3 mL    Inject 1 mL (150 mg) into the muscle every 3 months    Encounter for surveillance of injectable contraceptive, Dysmenorrhea       Multi-vitamin Tabs tablet   Generic drug:  multivitamin, therapeutic with minerals      Take 1 tablet by mouth daily Reported on 3/28/2017        naloxone nasal spray    NARCAN    0.2 mL    Spray 1 spray (4 mg) into one nostril alternating nostrils as needed for opioid reversal every 2-3 minutes until assistance arrives    Chronic pain syndrome, Chronic bilateral low back pain with left-sided sciatica, S/P lumbar fusion, Lumbar disc disease with radiculopathy       NONFORMULARY      Apply 30 mLs topically 4 times daily        ondansetron 4 MG ODT tab    ZOFRAN ODT    60 tablet    Take 1-2 tablets (4-8 mg) by mouth every 6 hours as needed for nausea Take 1-2 tablets by mouth as needed for nausea    Nausea       order for DME     1 each    Nebulizer with mask and tubing    Mild persistent asthma without complication       oxyCODONE IR 10 MG tablet    ROXICODONE    84 tablet    Take 1 tablet (10 mg) by mouth every 4 hours as needed maximum 6 tablet(s) per day    Chronic pain syndrome, Chronic bilateral low back pain with left-sided sciatica, S/P lumbar fusion, Lumbar disc disease with radiculopathy       polyethylene glycol Packet     MIRALAX/GLYCOLAX     Take 1 packet by mouth daily        promethazine 6.25 MG/5ML syrup    PHENERGAN    560 mL    Take 10 mLs (12.5 mg) by mouth 4 times daily as needed for nausea        rizatriptan 5 MG ODT tab    MAXALT-MLT    18 tablet    Take 1-2 tablets (5-10 mg) by mouth at onset of headache for migraine May repeat in 2 hours. Max 6 tablets/24 hours.    Migraine with aura and without status migrainosus, not intractable       senna-docusate 8.6-50 MG per tablet    SENOKOT-S;PERICOLACE    60 tablet    Take 1 tablet by mouth 2 times daily as needed for constipation    Drug-induced constipation       * Notice:  This list has 4 medication(s) that are the same as other medications prescribed for you. Read the directions carefully, and ask your doctor or other care provider to review them with you.

## 2017-11-13 ENCOUNTER — OFFICE VISIT (OUTPATIENT)
Dept: FAMILY MEDICINE | Facility: OTHER | Age: 27
End: 2017-11-13
Payer: MEDICARE

## 2017-11-13 VITALS
SYSTOLIC BLOOD PRESSURE: 124 MMHG | RESPIRATION RATE: 20 BRPM | TEMPERATURE: 98.9 F | HEART RATE: 139 BPM | OXYGEN SATURATION: 99 % | DIASTOLIC BLOOD PRESSURE: 78 MMHG | BODY MASS INDEX: 26.52 KG/M2 | WEIGHT: 145 LBS

## 2017-11-13 DIAGNOSIS — G89.4 CHRONIC PAIN SYNDROME: ICD-10-CM

## 2017-11-13 DIAGNOSIS — G89.18 POSTOPERATIVE PAIN: Primary | ICD-10-CM

## 2017-11-13 DIAGNOSIS — T81.31XD POSTOPERATIVE WOUND DEHISCENCE, SUBSEQUENT ENCOUNTER: ICD-10-CM

## 2017-11-13 DIAGNOSIS — S32.2XXK CLOSED FRACTURE OF COCCYX WITH NONUNION, SUBSEQUENT ENCOUNTER: ICD-10-CM

## 2017-11-13 DIAGNOSIS — Z98.1 S/P LUMBAR FUSION: ICD-10-CM

## 2017-11-13 DIAGNOSIS — M51.16 LUMBAR DISC DISEASE WITH RADICULOPATHY: ICD-10-CM

## 2017-11-13 DIAGNOSIS — M54.42 CHRONIC BILATERAL LOW BACK PAIN WITH LEFT-SIDED SCIATICA: ICD-10-CM

## 2017-11-13 DIAGNOSIS — G89.29 CHRONIC BILATERAL LOW BACK PAIN WITH LEFT-SIDED SCIATICA: ICD-10-CM

## 2017-11-13 PROCEDURE — 99215 OFFICE O/P EST HI 40 MIN: CPT | Performed by: PHYSICIAN ASSISTANT

## 2017-11-13 RX ORDER — OXYCODONE HYDROCHLORIDE 5 MG/1
5 TABLET ORAL EVERY 4 HOURS PRN
Qty: 18 TABLET | Refills: 0 | Status: SHIPPED | OUTPATIENT
Start: 2017-11-13 | End: 2018-01-15

## 2017-11-13 RX ORDER — MORPHINE SULFATE 15 MG/1
15 TABLET, FILM COATED, EXTENDED RELEASE ORAL EVERY 12 HOURS
Qty: 10 TABLET | Refills: 0 | Status: SHIPPED | OUTPATIENT
Start: 2017-11-13 | End: 2017-11-22

## 2017-11-13 ASSESSMENT — PAIN SCALES - GENERAL: PAINLEVEL: EXTREME PAIN (8)

## 2017-11-13 NOTE — MR AVS SNAPSHOT
After Visit Summary   11/13/2017    Katy Islas    MRN: [id number]           Patient Information     Date Of Birth          1990        Visit Information        Provider Department      11/13/2017 10:30 AM Aura Rosario PA-C; [facility] Kingman        Today's Diagnoses     Postoperative pain    -  1    Postoperative wound dehiscence, subsequent encounter        S/P lumbar fusion        Closed fracture of coccyx with nonunion, subsequent encounter        Lumbar disc disease with radiculopathy        Chronic pain syndrome        Chronic bilateral low back pain with left-sided sciatica           Follow-ups after your visit        Who to contact     If you have questions or need follow up information about today's clinic visit or your schedule please contact [facility] directly at [phone].  Normal or non-critical lab and imaging results will be communicated to you by MyChart, letter or phone within 4 business days after the clinic has received the results. If you do not hear from us within 7 days, please contact the clinic through MyChart or phone. If you have a critical or abnormal lab result, we will notify you by phone as soon as possible.  Submit refill requests through [email or web address] or call your pharmacy and they will forward the refill request to us. Please allow 3 business days for your refill to be completed.          Additional Information About Your Visit        MyChart Information     [email or web address] gives you secure access to your electronic health record. If you see a primary care provider, you can also send messages to your care team and make appointments. If you have questions, please call your primary care clinic.  If you do not have a primary care provider, please call [phone] and they will assist you.        Care EveryWhere ID     This is your Care EveryWhere ID. This could be used by other organizations to access your [place]  medical records  [id number]        Your Vitals Were     Pulse Temperature Respirations Last Period Pulse Oximetry BMI (Body Mass Index)    139 98.9  F (37.2  C) (Oral) 20 10/29/2017 99% 26.52 kg/m2       Blood Pressure from Last 3 Encounters:   11/13/17 124/78   11/09/17 130/84   10/31/17 110/70    Weight from Last 3 Encounters:   11/13/17 145 lb (65.8 kg)   11/09/17 146 lb (66.2 kg)   10/31/17 152 lb 3.2 oz (69 kg)              Today, you had the following     No orders found for display         Today's Medication Changes          These changes are accurate as of: 11/13/17 11:47 AM.  If you have any questions, ask your nurse or doctor.               Start taking these medicines.        Dose/Directions    morphine 15 MG 12 hr tablet   Commonly known as:  MS CONTIN   Used for:  Chronic pain syndrome, Chronic bilateral low back pain with left-sided sciatica, S/P lumbar fusion, Lumbar disc disease with radiculopathy   Started by:  Aura Rosario PA-C        Dose:  15 mg   Take 1 tablet (15 mg) by mouth every 12 hours maximum 2 tablet(s) per day   Quantity:  10 tablet   Refills:  0       oxyCODONE IR 5 MG tablet   Commonly known as:  ROXICODONE   Used for:  Chronic pain syndrome, Chronic bilateral low back pain with left-sided sciatica, S/P lumbar fusion, Lumbar disc disease with radiculopathy   Started by:  Aura Rosario PA-C        Dose:  5 mg   Take 1 tablet (5 mg) by mouth every 4 hours as needed for pain maximum 4 tablet(s) per day   Quantity:  18 tablet   Refills:  0         Stop taking these medicines if you haven't already. Please contact your care team if you have questions.     HYDROmorphone 2 MG tablet   Commonly known as:  DILAUDID   Stopped by:  Aura Rosario PA-C                Where to get your medicines      Some of these will need a paper prescription and others can be bought over the counter.  Ask your nurse if you have questions.     Bring a paper  prescription for each of these medications     morphine 15 MG 12 hr tablet    oxyCODONE IR 5 MG tablet                Primary Care Provider Office Phone # Fax #    Aura DIAZ LOVE Rosario [phone]       [address] [place] [address] [address]  [facility] [address]        Equal Access to Services     VIKY VILLANUEVA : Hadii aad ku hadasho Soomaali, waaxda luqadaha, qaybta kaalmada adeegyada, waxay calebin haygeraldon norberto tomlinluis mlakshmi turner. So [facility] [phone].    ATENCIÓN: Si habla español, tiene a kerr disposición servicios gratuitos de asistencia lingüística. [facility] [phone].    We comply with applicable federal civil rights laws and [place] laws. We do not discriminate on the basis of race, color, national origin, age, disability, sex, sexual orientation, or gender identity.            Thank you!     Thank you for choosing [facility]  for your care. Our goal is always to provide you with excellent care. Hearing back from our patients is one way we can continue to improve our services. Please take a few minutes to complete the written survey that you may receive in the mail after your visit with us. Thank you!             Your Updated Medication List - Protect others around you: Learn how to safely use, store and throw away your medicines at www.disposemymeds.org.          This list is accurate as of: 11/13/17 11:47 AM.  Always use your most recent med list.                   Brand Name Dispense Instructions for use Diagnosis    * albuterol 108 (90 BASE) MCG/ACT Inhaler    PROAIR HFA/PROVENTIL HFA/VENTOLIN HFA    1 Inhaler    Inhale 2 puffs into the lungs every 6 hours as needed for shortness of breath / dyspnea or wheezing    Mild persistent asthma without complication       * albuterol (2.5 MG/3ML) 0.083% neb solution     50 vial    Take 1 vial (2.5 mg) by nebulization every 6 hours as needed for shortness of breath / dyspnea or wheezing    Mild persistent asthma without complication        ARIPiprazole 5 MG tablet    ABILIFY    30 tablet    Take 1 tablet (5 mg) by mouth At Bedtime    Adjustment disorder with mixed anxiety and depressed mood, Anxiety       cephALEXin 500 MG capsule    KEFLEX          * cetirizine 10 MG tablet    zyrTEC     Take 10 mg by mouth daily        * cetirizine 10 MG tablet    zyrTEC    90 tablet    Take 1 tablet (10 mg) by mouth every evening    Chronic seasonal allergic rhinitis due to pollen       cyclobenzaprine 10 MG tablet    FLEXERIL    60 tablet    Take 2 tablets (20 mg) by mouth nightly as needed for muscle spasms or other (back pain)    Chronic bilateral low back pain with left-sided sciatica       dexlansoprazole 60 MG Cpdr CR capsule    DEXILANT    30 capsule    Take 1 capsule (60 mg) by mouth daily    Gastroesophageal reflux disease without esophagitis       diazepam 5 MG tablet    VALIUM    60 tablet    Take 0.5-1 tablets (2.5-5 mg) by mouth every 12 hours as needed for anxiety or sleep (MUSCLE SPASM) (one month supply)    Chronic pain syndrome, Chronic bilateral low back pain with left-sided sciatica, Lumbar disc disease with radiculopathy, S/P lumbar fusion       dicyclomine 20 MG tablet    BENTYL    30 tablet    Take 1 tablet (20 mg) by mouth 4 times daily as needed (ABDOMINAL CRAMPING) AS NEEDED FOR CRAMPING    Hx of Crohn's disease       diphenoxylate-atropine 2.5-0.025 MG per tablet    LOMOTIL    10 tablet    Take 1 tablet by mouth 4 times daily as needed for diarrhea        DULoxetine 60 MG EC capsule    CYMBALTA    30 capsule    Take 1 capsule (60 mg) by mouth daily    Adjustment disorder with mixed anxiety and depressed mood       EPINEPHrine 0.3 MG/0.3ML injection 2-pack    EPIPEN/ADRENACLICK/or ANY BX GENERIC EQUIV    0.6 mL    Inject 0.3 mLs (0.3 mg) into the muscle once as needed for anaphylaxis    Bee sting allergy       FLEX-A-MIN JOINT FLEX PO      Take by mouth daily Reported on 3/28/2017        gabapentin 300 MG capsule    NEURONTIN    90 capsule     Take 1 capsule (300 mg) by mouth 3 times daily    Chronic bilateral low back pain with left-sided sciatica       meclizine 12.5 MG tablet    ANTIVERT    30 tablet    Take 1 tablet (12.5 mg) by mouth 4 times daily as needed for dizziness    Dizziness       medical cannabis inhalation (Patient's own supply.  Not a prescription)      Inhale into the lungs 3 times daily as needed Reported on 3/28/2017        medroxyPROGESTERone 150 MG/ML injection    DEPO-PROVERA    3 mL    Inject 1 mL (150 mg) into the muscle every 3 months    Encounter for surveillance of injectable contraceptive, Dysmenorrhea       morphine 15 MG 12 hr tablet    MS CONTIN    10 tablet    Take 1 tablet (15 mg) by mouth every 12 hours maximum 2 tablet(s) per day    Chronic pain syndrome, Chronic bilateral low back pain with left-sided sciatica, S/P lumbar fusion, Lumbar disc disease with radiculopathy       Multi-vitamin Tabs tablet   Generic drug:  multivitamin, therapeutic with minerals      Take 1 tablet by mouth daily Reported on 3/28/2017        naloxone nasal spray    NARCAN    0.2 mL    Spray 1 spray (4 mg) into one nostril alternating nostrils as needed for opioid reversal every 2-3 minutes until assistance arrives    Chronic pain syndrome, Chronic bilateral low back pain with left-sided sciatica, S/P lumbar fusion, Lumbar disc disease with radiculopathy       NONFORMULARY      Apply 30 mLs topically 4 times daily        ondansetron 4 MG ODT tab    ZOFRAN ODT    60 tablet    Take 1-2 tablets (4-8 mg) by mouth every 6 hours as needed for nausea Take 1-2 tablets by mouth as needed for nausea    Nausea       order for DME     1 each    Nebulizer with mask and tubing    Mild persistent asthma without complication       oxyCODONE IR 5 MG tablet    ROXICODONE    18 tablet    Take 1 tablet (5 mg) by mouth every 4 hours as needed for pain maximum 4 tablet(s) per day    Chronic pain syndrome, Chronic bilateral low back pain with left-sided sciatica,  S/P lumbar fusion, Lumbar disc disease with radiculopathy       polyethylene glycol Packet    MIRALAX/GLYCOLAX     Take 1 packet by mouth daily        promethazine 6.25 MG/5ML syrup    PHENERGAN    560 mL    Take 10 mLs (12.5 mg) by mouth 4 times daily as needed for nausea        rizatriptan 5 MG ODT tab    MAXALT-MLT    18 tablet    Take 1-2 tablets (5-10 mg) by mouth at onset of headache for migraine May repeat in 2 hours. Max 6 tablets/24 hours.    Migraine with aura and without status migrainosus, not intractable       senna-docusate 8.6-50 MG per tablet    SENOKOT-S;PERICOLACE    60 tablet    Take 1 tablet by mouth 2 times daily as needed for constipation    Drug-induced constipation       * Notice:  This list has 4 medication(s) that are the same as other medications prescribed for you. Read the directions carefully, and ask your doctor or other care provider to review them with you.

## 2017-11-13 NOTE — NURSING NOTE
"Chief Complaint   Patient presents with     Pain     recheck     Panel Management     flu       Initial /78 (BP Location: Left arm, Patient Position: Chair, Cuff Size: Adult Regular)  Pulse 139  Temp 98.9  F (37.2  C) (Oral)  Resp 20  Wt 145 lb (65.8 kg)  LMP 10/29/2017  SpO2 99%  BMI 26.52 kg/m2 Estimated body mass index is 26.52 kg/(m^2) as calculated from the following:    Height as of 10/25/17: 5' 2\" (1.575 m).    Weight as of this encounter: 145 lb (65.8 kg).  Medication Reconciliation: complete  "

## 2017-11-13 NOTE — TELEPHONE ENCOUNTER
Will discuss today.     - Surgeon apt 11/17   - Wound care daily     Zach Tomlinson-LOVE Moss  [facility]

## 2017-11-14 ENCOUNTER — MYC MEDICAL ADVICE (OUTPATIENT)
Dept: FAMILY MEDICINE | Facility: OTHER | Age: 27
End: 2017-11-14

## 2017-11-14 ENCOUNTER — TRANSFERRED RECORDS (OUTPATIENT)
Dept: HEALTH INFORMATION MANAGEMENT | Facility: CLINIC | Age: 27
End: 2017-11-14

## 2017-11-14 NOTE — TELEPHONE ENCOUNTER
Responded via [employer] with 2 messages, one to gather information and one with CDL recommendations. Nohemi Jennings RN, BSN

## 2017-11-15 ENCOUNTER — TELEPHONE (OUTPATIENT)
Dept: FAMILY MEDICINE | Facility: OTHER | Age: 27
End: 2017-11-15

## 2017-11-15 ENCOUNTER — MYC MEDICAL ADVICE (OUTPATIENT)
Dept: FAMILY MEDICINE | Facility: OTHER | Age: 27
End: 2017-11-15

## 2017-11-15 NOTE — TELEPHONE ENCOUNTER
Please see my chart message below as to a work in appt today.  Thank you      ----- Message -----        From: Katy Islas        Sent: 11/14/2017   7:27 PM          To: Erc  Pool     Subject: Appointment Request                                    Appointment Request From: Katy Islas          With Provider: Aura Rosario PA-C [-Primary Care Physician-]          Preferred Date Range: From 11/14/2017 To 11/15/2017          Preferred Times: Any          Reason for visit: Request an Appointment          Comments:     Please try fit me in ASAP lennox I m in ALOT of pain tailbone area. I cannot near it anymore.

## 2017-11-16 ENCOUNTER — HOSPITAL ENCOUNTER (EMERGENCY)
Facility: CLINIC | Age: 27
Discharge: HOME | End: 2017-11-16
Attending: PHYSICIAN ASSISTANT | Admitting: PHYSICIAN ASSISTANT
Payer: MEDICARE

## 2017-11-16 VITALS
SYSTOLIC BLOOD PRESSURE: 143 MMHG | RESPIRATION RATE: 16 BRPM | DIASTOLIC BLOOD PRESSURE: 86 MMHG | TEMPERATURE: 99.5 F | HEART RATE: 89 BPM | OXYGEN SATURATION: 98 %

## 2017-11-16 DIAGNOSIS — T81.31XD POSTOPERATIVE WOUND DEHISCENCE, SUBSEQUENT ENCOUNTER: ICD-10-CM

## 2017-11-16 DIAGNOSIS — Z48.00 CHANGE OR REMOVAL OF WOUND PACKING: ICD-10-CM

## 2017-11-16 LAB
ALBUMIN SERPL-MCNC: 3.4 G/DL (ref 3.4–5)
ALBUMIN UR-MCNC: NEGATIVE MG/DL
ALP SERPL-CCNC: 92 U/L (ref 40–150)
ALT SERPL W P-5'-P-CCNC: 19 U/L (ref 0–50)
ANION GAP SERPL CALCULATED.3IONS-SCNC: 5 MMOL/L (ref 3–14)
APPEARANCE UR: CLEAR
AST SERPL W P-5'-P-CCNC: 14 U/L (ref 0–45)
BASOPHILS # BLD AUTO: 0 10E9/L (ref 0–0.2)
BASOPHILS NFR BLD AUTO: 0.5 %
BILIRUB SERPL-MCNC: 0.2 MG/DL (ref 0.2–1.3)
BILIRUB UR QL STRIP: NEGATIVE
BUN SERPL-MCNC: 12 MG/DL (ref 7–30)
CALCIUM SERPL-MCNC: 8.9 MG/DL (ref 8.5–10.1)
CHLORIDE SERPL-SCNC: 105 MMOL/L (ref 94–109)
CO2 SERPL-SCNC: 31 MMOL/L (ref 20–32)
COLOR UR AUTO: YELLOW
CREAT SERPL-MCNC: 0.7 MG/DL (ref 0.52–1.04)
DIFFERENTIAL METHOD BLD: ABNORMAL
EOSINOPHIL # BLD AUTO: 0.1 10E9/L (ref 0–0.7)
EOSINOPHIL NFR BLD AUTO: 2.8 %
ERYTHROCYTE [DISTWIDTH] IN BLOOD BY AUTOMATED COUNT: 11.8 % (ref 10–15)
GFR SERPL CREATININE-BSD FRML MDRD: >90 ML/MIN/1.7M2
GLUCOSE SERPL-MCNC: 89 MG/DL (ref 70–99)
GLUCOSE UR STRIP-MCNC: NEGATIVE MG/DL
HCT VFR BLD AUTO: 36.2 % (ref 35–47)
HGB BLD-MCNC: 12.2 G/DL (ref 11.7–15.7)
HGB UR QL STRIP: NEGATIVE
IMM GRANULOCYTES # BLD: 0 10E9/L (ref 0–0.4)
IMM GRANULOCYTES NFR BLD: 0.3 %
KETONES UR STRIP-MCNC: NEGATIVE MG/DL
LACTATE BLD-SCNC: 1 MMOL/L (ref 0.7–2)
LEUKOCYTE ESTERASE UR QL STRIP: NEGATIVE
LYMPHOCYTES # BLD AUTO: 1.4 10E9/L (ref 0.8–5.3)
LYMPHOCYTES NFR BLD AUTO: 36.3 %
MCH RBC QN AUTO: 32.5 PG (ref 26.5–33)
MCHC RBC AUTO-ENTMCNC: 33.7 G/DL (ref 31.5–36.5)
MCV RBC AUTO: 97 FL (ref 78–100)
MONOCYTES # BLD AUTO: 0.5 10E9/L (ref 0–1.3)
MONOCYTES NFR BLD AUTO: 12.4 %
MUCOUS THREADS #/AREA URNS LPF: PRESENT /LPF
NEUTROPHILS # BLD AUTO: 1.8 10E9/L (ref 1.6–8.3)
NEUTROPHILS NFR BLD AUTO: 47.7 %
NITRATE UR QL: NEGATIVE
PH UR STRIP: 8 PH (ref 5–7)
PLATELET # BLD AUTO: 236 10E9/L (ref 150–450)
POTASSIUM SERPL-SCNC: 3.9 MMOL/L (ref 3.4–5.3)
PROT SERPL-MCNC: 7.3 G/DL (ref 6.8–8.8)
RBC # BLD AUTO: 3.75 10E12/L (ref 3.8–5.2)
RBC #/AREA URNS AUTO: 0 /HPF (ref 0–2)
SODIUM SERPL-SCNC: 141 MMOL/L (ref 133–144)
SOURCE: ABNORMAL
SP GR UR STRIP: 1.01 (ref 1–1.03)
SQUAMOUS #/AREA URNS AUTO: 5 /HPF (ref 0–1)
UROBILINOGEN UR STRIP-MCNC: 0 MG/DL (ref 0–2)
WBC # BLD AUTO: 3.9 10E9/L (ref 4–11)
WBC #/AREA URNS AUTO: <1 /HPF (ref 0–2)

## 2017-11-16 PROCEDURE — 81001 URINALYSIS AUTO W/SCOPE: CPT | Performed by: PHYSICIAN ASSISTANT

## 2017-11-16 PROCEDURE — 99285 EMERGENCY DEPT VISIT HI MDM: CPT | Mod: 25 | Performed by: PHYSICIAN ASSISTANT

## 2017-11-16 PROCEDURE — A9270 NON-COVERED ITEM OR SERVICE: HCPCS | Mod: GY | Performed by: PHYSICIAN ASSISTANT

## 2017-11-16 PROCEDURE — 96375 TX/PRO/DX INJ NEW DRUG ADDON: CPT | Performed by: PHYSICIAN ASSISTANT

## 2017-11-16 PROCEDURE — 25000132 ZZH RX MED GY IP 250 OP 250 PS 637: Mod: GY | Performed by: PHYSICIAN ASSISTANT

## 2017-11-16 PROCEDURE — 80053 COMPREHEN METABOLIC PANEL: CPT | Performed by: PHYSICIAN ASSISTANT

## 2017-11-16 PROCEDURE — 96374 THER/PROPH/DIAG INJ IV PUSH: CPT | Performed by: PHYSICIAN ASSISTANT

## 2017-11-16 PROCEDURE — 25000128 H RX IP 250 OP 636: Performed by: PHYSICIAN ASSISTANT

## 2017-11-16 PROCEDURE — 83605 ASSAY OF LACTIC ACID: CPT | Performed by: PHYSICIAN ASSISTANT

## 2017-11-16 PROCEDURE — 85025 COMPLETE CBC W/AUTO DIFF WBC: CPT | Performed by: PHYSICIAN ASSISTANT

## 2017-11-16 PROCEDURE — 99285 EMERGENCY DEPT VISIT HI MDM: CPT | Mod: Z6 | Performed by: PHYSICIAN ASSISTANT

## 2017-11-16 RX ORDER — KETAMINE HYDROCHLORIDE 10 MG/ML
0.1 INJECTION, SOLUTION INTRAMUSCULAR; INTRAVENOUS
Status: DISCONTINUED | OUTPATIENT
Start: 2017-11-16 | End: 2017-11-16

## 2017-11-16 RX ORDER — FENTANYL CITRATE 50 UG/ML
100 INJECTION, SOLUTION INTRAMUSCULAR; INTRAVENOUS ONCE
Status: COMPLETED | OUTPATIENT
Start: 2017-11-16 | End: 2017-11-16

## 2017-11-16 RX ORDER — FENTANYL 25 UG/1
25 PATCH TRANSDERMAL
Status: DISCONTINUED | OUTPATIENT
Start: 2017-11-16 | End: 2017-11-16 | Stop reason: HOSPADM

## 2017-11-16 RX ADMIN — FENTANYL 1 PATCH: 25 PATCH, EXTENDED RELEASE TRANSDERMAL at 15:57

## 2017-11-16 RX ADMIN — FENTANYL CITRATE 100 MCG: 50 INJECTION, SOLUTION INTRAMUSCULAR; INTRAVENOUS at 14:14

## 2017-11-16 RX ADMIN — HYDROMORPHONE HYDROCHLORIDE 1 MG: 1 INJECTION, SOLUTION INTRAMUSCULAR; INTRAVENOUS; SUBCUTANEOUS at 14:42

## 2017-11-16 ASSESSMENT — ENCOUNTER SYMPTOMS
FEVER: 0
CONSTIPATION: 0
DIARRHEA: 0
NAUSEA: 1
VOMITING: 0

## 2017-11-16 NOTE — TELEPHONE ENCOUNTER
Provider spoke with patient, advised to go to ED to get her pain under control. RN to call [facility] clinic [phone]. 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     Spoke with MARY Gibbs from Dr Eugene's clinic. Has an appointment tomorrow at [facility] with Dr Eugene in [place] to discuss the pain management. To keep this appointment. Agreed with CDL recommendations to be evaluated in the ED today and follow up with Dr Eugene tomorrow. Discussed the amount of appointments she has had in clinic to address pain and past medications given.    RN verbally updated provider. Nohemi Jennings, RN, BSN

## 2017-11-16 NOTE — DISCHARGE INSTRUCTIONS
Please follow-up at your scheduled appointment with your surgeon tomorrow.  It is important that you are cooperative with further wound packing changes because this will help things heal- I know it hurts, but use coping skills like you did today (you did great!). For future pain medications, you will need to see your surgeon and/or primary care provider. I encourage you to stay positive during this healing process, it will get better with time.     If you develop fever, chills, or worsening symptoms, please return to the emergency department.    Thank you for choosing [facility]'s Emergency Department. It was a pleasure taking care of you today. If you have any questions, please call [phone].    Marian Barton PA-C    Wound Care After Surgery: Pain  Surgery involves cutting through layers of skin, fatty tissue, muscle, and sometimes bone and cartilage. Sutures (stitches) or staples are used to close all layers of the wound. The sutures on the inside will dissolve in about 2 to 3 weeks. Any sutures or staples used on the outside need to be removed in about 7 to 14 days, depending on the location.  It is normal to feel pain at the incision site. The pain decreases as the wound heals. Most of the pain and soreness where the skin was cut should go away by the time the sutures or staples are removed. Soreness and pain from deeper tissues may last another week or two.  Pain that continues more than a few weeks after surgery or pain that worsens anytime after surgery can be a sign of a problem, such as:    Infection    Separation of wound edges    Collection of blood or fluid below the skin  Home care  Different types of surgery require different types of care and dressing changes. It is important to follow all instructions and advice from your surgeon, as well as other members of your healthcare team.  Wound care    Keep the wound clean, as directed by your healthcare provider.    Change the dressing as  directed. Change the dressing or sooner if it becomes wet or stained with blood or fluid from the wound.    Bathe with a sponge (no shower or tub baths) for the first few days after surgery, or until there is no more drainage from the wound. Unless you received different instructions from your surgeon, you can then shower. Do not soak the area in water (no baths or swimming) until the sutures, staples, or butterfly bandages are removed and any wound opening has dried out and healed.  Changing the dressing    Wash your hands before changing the dressings.    Carefully remove the dressing and tape; don t just yank it off. If it sticks to the wound, you may need to wet it a little to remove it, unless your healthcare provider told you not to wet it.    Wash your hands again before putting on a new, clean dressing.    Gently clean the wound with clean water (or saline) using gauze or a clean washcloth. Do not rub it or pick at it.    Do not use soap, alcohol, hydrogen peroxide, or any other cleanser.    If you were told to dry the wound before putting on a new dressing, gently pat it dry. Do not rub.    Throw out the old dressing. Do not reuse it!    Wash your hands again when you are done.  Types of dressings  Your healthcare team will tell you what type of dressing to put on your wound. Follow your healthcare team s instructions carefully, and contact them if you have any questions. Two common types of dressings are described below. You may have one of these or another type.    Dry dressing. Use dry gauze. If the wound is still draining, use a nonadherent dressing, which shouldn t stick to the wound.    Wet-to-dry dressing. Wet the gauze, and squeeze out the excess water (or saline), before putting it on. Then, cover this with a dry pad.  Medicines    If you were given antibiotics, take them until they are used up or your healthcare provider tells you to stop. It is important to finish the antibiotics even though you  feel better, to make sure the infection has cleared.    You can take acetaminophen or ibuprofen for pain, unless you were given a different pain medicine to use. (Note: If you have chronic liver or kidney disease, have ever had a stomach ulcer or gastrointestinal bleeding, or are taking blood thinner medicines, talk with your healthcare provider before using these medicines.)    Aspirin should never be used in anyone under 18 years of age who is ill with a fever. It may cause severe liver damage.  Follow-up care  Follow up with your healthcare provider, or as advised, for your next wound check or removal of your sutures, staples, or tape.    If a culture was done, you will be notified if the results will affect your treatment. You can call as directed for the results.    If imaging tests, such as X-rays, an ultrasound, or CT scan were done, they will be reviewed by a specialist. You will be notified of the results, especially if they affect treatment.  Call 911  Call emergency services right away if any of these occur:    Trouble breathing or swallowing, wheezing    Hoarse voice or trouble speaking    Extreme confusion    Extreme drowsiness or trouble awakening    Fainting or loss of consciousness    Rapid heart rate or very slow heart rate    Vomiting blood, or large amounts of blood in stool    Discomfort in the center of the chest that feels like pressure, squeezing, a sense of fullness, or pain.    Discomfort or pain in other upper body areas, such as the back, one or both arms, neck, jaw, or stomach    Stroke 911 symptoms (spot a stroke  FAST )    F: Face drooping. One side of the face is numb or droops.    A: Arm weakness. One arm feels weak or numb.    S: Speech difficulty. Speech is slurred, or the person is unable to speak.    T: Time to call 911. Even if the symptoms go away, call 911.  When to seek medical advice  Call your healthcare provider right away if any of the following occur:    Increasing pain  at the site of surgery    Fever over 100.4  F (38  C)    Redness around the wound    Fluid, pus, or blood draining from the wound    Vomiting, constipation, or diarrhea

## 2017-11-16 NOTE — TELEPHONE ENCOUNTER
Patient would like to be worked in today with Zach. She said she would like to see her ASAP today for her pain.   She also said she is developing a UTI. Could Zach work her in today?

## 2017-11-16 NOTE — TELEPHONE ENCOUNTER
Spoke with patient at 11:30 AM   - Advised again that there is nothing further I can do for her in clinic   - I am very concerned that she is risking infection and abscess by not getting her wound packed   - I am also very concerned about the threats she has made to hurt herself due to the pain   - I advised ED to get control of her pain and get her wound re-packed     - Katy agreed to go, she will call her mom for a ride to the ER      - I called and spoke to [facility] ED - Dr. Zamora      Discussed I don't know what I can do here in clinic to help her      She may need to stay in the hospital for pain and wound care or get sent to a nursing home for pain management at wound care     Additionally      She is taking such high doses of pain medication, I no longer think it is safe for her to be managed out patient (along with her mental ganesh concerns)      My team will also be reaching out to Dr. Millan for assistance       Zach Rosario PA-C  [facility]

## 2017-11-16 NOTE — TELEPHONE ENCOUNTER
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    Per MyChart message below, patient told to follow up with Dr. Eugene.  Abimbola Castro, CMA

## 2017-11-16 NOTE — ED PROVIDER NOTES
History     Chief Complaint   Patient presents with     Wound Infection     HPI Comments: Patient has a history of congenital hearing loss. History was obtained with the assistance of a certified American Sign Language interpretor.     The history is provided by the patient. The history is limited by a language barrier. A  was used (American Sign Language).     Katy Islas is a 27 year old female with a history of Chronic Pain, Congenital Hearing Loss, and Bipolar Disorder who presents to the ED complaining of postoperative pain. The patient had a nonunion coccygeal fracture treated with a coccygeal resection by Dr. Millan of [facility] on 10/18/2017, about 1 month ago. She felt that she was healing normally for about 1 week, but then she began to experience complications. She has had several visits for this since then:    - 10/25/2017, [facility] ED. The patient presented to the ED complaining of pain and drainage at the incision site. She was found to have a small wound dehiscence at that time so steri strips were placed and the patient was discharged.     - 10/26/2017, [facility] ED. The patient was seen in the ED with worsening pain and drainage at the incision site. She requested IV Dilaudid at that time but was instead offered oral Oxycodone. She declined and left before her wound could be dressed.     - 10/29/2017, [facility] ED. The patient presented to the ED complaining of difficulty managing her pain. She had no signs of infection at that time so she was given oral Oxycodone and discharged.     - 10/31/2017, Clinic. She was seen in the clinic for an ED follow up at which time she reported persisting buttock pain. Her dose of Oxycodone was raised.     - 11/8/2017, [facility] ED. Patient complained of a foul smelling discharge from the incision site. She was found to have a wound infection at that time so she was started on Clindamycin. Patient's wound was packed  "and she was set up with home care for wound dressings.     -11/9/2017, Clinic. Patient presented to the clinic for an ED follow up and again reported persisting pain. She was started on a short course of Dilaudid for pain.     -11/12/2017, [facility] ED. Patient presented complaining of a low grade fever. She also requested to have her wound packed as she would not allow home care to pack it due to the pain. The patient became angry after she was given IM Dilaudid instead of IV Dilaudid. She became verbally abusive with the ED staff, refused packing, and left AMA.     -11/12/2017, [facility] ED. The patient returned to the ED the same day, demanding to be admitted for pain control. The provider did not find any need for the patient to be admitted so the patient again became angry and left.     -11/13/2017, Clinic. Patient was seen in the clinic complaining of worsening pain to her incision site. She was given a small prescription of MS Contin and Oxycodone from her PCP. Her PCP then told her to begin receiving her pain medications from her surgeon.     -11/14/2017. [facility]. Patient was evaluated by wound specialists who recommended a wound VAC. Patient became angry when she was told that the wound VAC could not be placed that day due to insurance reasons. She left abruptly. She called the clinic back later and threatened to armin them for \"making her wait that long\".     -11/15/2017. Patient contacted the clinic very frustrated, reporting that she could no longer tolerate the pain and \"would rather kill herself than keep going through this!\". She complained that she was also almost out of her pain medication. She was put in touch with her surgeon and an appointment was scheduled for a follow up tomorrow.     - Patient has had several visits from homecare. She has treated with both Keflex and Clindamycin. Patient has tried   Oxycodone, Dilaudid, MS Contin, and Fentanyl for pain control without relief. " "    Today, the patient presents to the ED complaining of persisting pain and drainage to the site of the coccygeal resection incision. She says that she was sent from the clinic due to a possible abscess. She states that she needs help with pain control and needs her wound packed. She states that her pain is unmanageable, despite using Oxycodone and MS Contin. She also notes \"extensive drainage\". She finished her Clindamycin this afternoon. Patient is scheduled to visit with her back surgeon tomorrow around 1330.  Patient denies any vomiting or other associated symptoms.       Problem List:    Patient Active Problem List    Diagnosis Date Noted     Postoperative pain 11/13/2017     Priority: Medium     Closed fracture of coccyx with nonunion, subsequent encounter 10/12/2017     Priority: Medium     Upper GI bleed - suspected 07/01/2017     Priority: Medium     Tobacco abuse 07/01/2017     Priority: Medium     History of pseudoseizure 07/01/2017     Priority: Medium     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) 03/10/2017     Priority: Medium     Lumbar disc disease with radiculopathy 02/23/2017     Priority: Medium     S/P lumbar fusion 02/23/2017     Priority: Medium     Dr. YOVANI Millan, 2/23/17       Vitamin D deficiency 01/06/2017     Priority: Medium     Atypical mole 01/06/2017     Priority: Medium     Mild persistent asthma without complication 12/02/2016     Priority: Medium     Chronic bilateral low back pain with left-sided sciatica 12/02/2016     Priority: Medium     Bee sting allergy 09/16/2016     Priority: Medium     Gastroesophageal reflux disease without esophagitis 08/26/2016     Priority: Medium     Herpes simplex virus infection 11/12/2015     Priority: Medium     Adjustment disorder with mixed anxiety and depressed mood 10/14/2015     Priority: Medium     Marijuana abuse 02/22/2015     Priority: Medium     Grand mal seizure disorder (H) 10/08/2013     Priority: Medium     Bipolar disorder " (H) 01/31/2013     Priority: Medium     Problem list name updated by automated process. Provider to review       Nausea and vomiting 12/13/2012     Priority: Medium     Abdominal pain 12/13/2012     Priority: Medium     Oral thrush 06/25/2012     Priority: Medium     Chronic pain 02/09/2012     Priority: Medium     Patient is followed by Aura Rosario PA-C for ongoing prescription of pain medication.  All refills should only be approved by this provider, or covering partner.    Medication(s): Norco    Maximum quantity per month: 70  Clinic visit frequency required: Q 2 months     Controlled substance agreement:   Discussed and signed 4/25/17    Encounter-Level CSA - 01/12/2015:                 Controlled Substance Agreement - Scan on 1/26/2015  9:14 AM : Controlled Medication Agreement-01/12/15 (below)            Pain Clinic evaluation in the past: Yes       Date/Location:   [facility], fall 2016    DIRE Total Score(s):    4/25/2017   Total Score 17       Last [facility] website verification:  done on 4/25/17 by LOVE Maki   [email or web address]           Anxiety 09/22/2011     Priority: Medium     Mild major depression (H) 08/29/2011     Priority: Medium     Crohn's colitis (H) 08/04/2009     Priority: Medium     Dysmenorrhea 05/11/2007     Priority: Medium     Benign neoplasm of skin of lower limb, including hip 03/16/2004     Priority: Medium     Migraine      Priority: Medium     Dr. Farrar - Neurology.  Now on Inderal.  Seems to be working.  Follow-up 9/08  Problem list name updated by automated process. Provider to review       Hearing loss      Priority: Medium     Congenital  Problem list name updated by automated process. Provider to review       Allergic rhinitis      Priority: Medium     Problem list name updated by automated process. Provider to review          Past Medical History:    Past Medical History:   Diagnosis Date     Abdominal pain 10/31- 11/4/2005     Allergic  rhinitis, cause unspecified      Arthritis      C. 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     FUSION SPINE POSTERIOR MINIMALLY INVASIVE ONE LEVEL N/A 2/23/2017    Procedure: FUSION SPINE POSTERIOR MINIMALLY INVASIVE ONE LEVEL;  L4-5 Oblique Lateral Lumbar Interbody Fusion   Epidural steroid injection.   Transpedicular Bone marrow aspiration;  Surgeon: Jeniffer Eugene MD;  Location: [facility] OR     HC COLONOSCOPY THRU STOMA WITH BIOPSY  10/29/2003    Impression is that of normal appearing colonoscopy, without evidence of rectal bleeding.     HC COLONOSCOPY THRU STOMA, DIAGNOSTIC  10/00    normal     HC COLONOSCOPY THRU STOMA, DIAGNOSTIC  Oct 2009    Dr López- normal     HC EEG AWAKE AND SLEEP      abnormal     HC MRI BRAIN W/O CONTRAST  12/00    normal     HC REMOVAL GALLBLADDER  8/5/2009    [facility].     HC UGI ENDOSCOPY DIAG W BIOPSY  11/11/09    Normal esophagus     HC UGI ENDOSCOPY, SIMPLE EXAM  7/00, 10/00    mild chronic esophagitis and duodenitis, neg H pylori     HC UGI ENDOSCOPY, SIMPLE EXAM  01/20/2005    Esophagogastroduodenoscopy, colonoscopy with biopsies.  [facility] UGI ENDOSCOPY, SIMPLE EXAM  7/1/2009    [facility]  [facility].      UGI ENDOSCOPY, SIMPLE EXAM  11/11/2009    attempted upper GI, pt. could not tolerate procedure:[facility]     ORTHOPEDIC SURGERY  October 19,2011    diskectomy L4-L5       Family History:    Family History   Problem Relation Age of Onset     GASTROINTESTINAL DISEASE Brother      severe Crohn's     Neurologic Disorder Brother      Seizures post head injury     Depression Brother      Substance Abuse Brother      Genitourinary Problems Father      kidney stones     DIABETES Father      HEART DISEASE Father      Open heart surgery     Breast Cancer Maternal Grandmother      Parkinsonism Maternal Grandmother      CEREBROVASCULAR DISEASE Paternal Grandmother      CANCER Maternal Grandfather      Lung     Cardiovascular Paternal Grandfather      Heart Attack     Substance Abuse Mother      Lung Cancer Paternal Uncle        Social History:  Marital Status:  Single [1]  Social History   Substance Use Topics     Smoking status: Current Every Day Smoker     Packs/day: 0.25     Types: Cigarettes     Smokeless tobacco: Never Used     Alcohol use 0.6 oz/week     1 Cans of beer per week      Comment: 1-2 drinks montlhly        Medications:      oxyCODONE IR (ROXICODONE) 5 MG tablet   morphine (MS CONTIN) 15 MG 12 hr tablet   cephALEXin (KEFLEX) 500 MG capsule   diazepam (VALIUM) 5 MG tablet   polyethylene glycol (MIRALAX/GLYCOLAX) Packet   naloxone (NARCAN) nasal spray   NONFORMULARY   promethazine (PHENERGAN) 6.25 MG/5ML syrup   diphenoxylate-atropine (LOMOTIL) 2.5-0.025 MG per tablet   cetirizine (ZYRTEC) 10 MG tablet   gabapentin (NEURONTIN) 300 MG capsule   cyclobenzaprine (FLEXERIL) 10 MG tablet   DULoxetine (CYMBALTA) 60 MG EC capsule   dexlansoprazole (DEXILANT) 60 MG CPDR CR capsule   albuterol (PROAIR HFA/PROVENTIL HFA/VENTOLIN HFA) 108 (90 BASE) MCG/ACT Inhaler   dicyclomine (BENTYL) 20 MG tablet   senna-docusate (SENOKOT-S;PERICOLACE) 8.6-50 MG per tablet   albuterol (2.5 MG/3ML)  0.083% neb solution   order for DME   ARIPiprazole (ABILIFY) 5 MG tablet   meclizine (ANTIVERT) 12.5 MG tablet   ondansetron (ZOFRAN ODT) 4 MG ODT tab   rizatriptan (MAXALT-MLT) 5 MG ODT tab   medroxyPROGESTERone (DEPO-PROVERA) 150 MG/ML injection   cetirizine (ZYRTEC) 10 MG tablet   medical cannabis inhalation (Patient's own supply.  Not a prescription)   EPINEPHrine (EPIPEN) 0.3 MG/0.3ML injection   Misc Natural Products (FLEX-A-MIN JOINT FLEX PO)   Multiple Vitamin (MULTI-VITAMIN) per tablet         Review of Systems   Constitutional: Negative for fever.   Gastrointestinal: Positive for nausea. Negative for constipation, diarrhea and vomiting.   Skin:        Positive for coccygeal resection incision.    All other systems reviewed and are negative.      Physical Exam   BP: 143/86  Pulse: 89  Temp: 99.5  F (37.5  C)  Resp: 16  SpO2: 98 %    /86  Pulse 89  Temp 99.5  F (37.5  C) (Oral)  Resp 16  LMP 10/29/2017  SpO2 98%    Physical Exam   Constitutional: She is oriented to person, place, and time. No distress.   HENT:   Head: Normocephalic and atraumatic.   Eyes: Conjunctivae and EOM are normal.   Neck: Normal range of motion. Neck supple.   Cardiovascular: Normal rate, regular rhythm, normal heart sounds and intact distal pulses.    No murmur heard.  Pulmonary/Chest: Effort normal and breath sounds normal. No respiratory distress. She has no wheezes. She has no rales. She exhibits no tenderness.   Musculoskeletal: Normal range of motion.   Lymphadenopathy:     She has no cervical adenopathy.   Neurological: She is alert and oriented to person, place, and time.   Skin: Skin is warm and dry. She is not diaphoretic.   2 cm x 1 cm open wound overlying coccyx region that is about 3 cm deep in all directions. No surrounding erythema. Minimal drainage    Psychiatric: She has a normal mood and affect. Her behavior is normal.   Nursing note and vitals reviewed.      ED Course     ED Course      Procedures        Critical Care time:  none     Results for orders placed or performed during the hospital encounter of 11/16/17 (from the past 24 hour(s))   CBC with platelets differential   Result Value Ref Range    WBC 3.9 (L) 4.0 - 11.0 10e9/L    RBC Count 3.75 (L) 3.8 - 5.2 10e12/L    Hemoglobin 12.2 11.7 - 15.7 g/dL    Hematocrit 36.2 35.0 - 47.0 %    MCV 97 78 - 100 fl    MCH 32.5 26.5 - 33.0 pg    MCHC 33.7 31.5 - 36.5 g/dL    RDW 11.8 10.0 - 15.0 %    Platelet Count 236 150 - 450 10e9/L    Diff Method Automated Method     % Neutrophils 47.7 %    % Lymphocytes 36.3 %    % Monocytes 12.4 %    % Eosinophils 2.8 %    % Basophils 0.5 %    % Immature Granulocytes 0.3 %    Absolute Neutrophil 1.8 1.6 - 8.3 10e9/L    Absolute Lymphocytes 1.4 0.8 - 5.3 10e9/L    Absolute Monocytes 0.5 0.0 - 1.3 10e9/L    Absolute Eosinophils 0.1 0.0 - 0.7 10e9/L    Absolute Basophils 0.0 0.0 - 0.2 10e9/L    Abs Immature Granulocytes 0.0 0 - 0.4 10e9/L   Comprehensive metabolic panel   Result Value Ref Range    Sodium 141 133 - 144 mmol/L    Potassium 3.9 3.4 - 5.3 mmol/L    Chloride 105 94 - 109 mmol/L    Carbon Dioxide 31 20 - 32 mmol/L    Anion Gap 5 3 - 14 mmol/L    Glucose 89 70 - 99 mg/dL    Urea Nitrogen 12 7 - 30 mg/dL    Creatinine 0.70 0.52 - 1.04 mg/dL    GFR Estimate >90 >60 mL/min/1.7m2    GFR Estimate If Black >90 >60 mL/min/1.7m2    Calcium 8.9 8.5 - 10.1 mg/dL    Bilirubin Total 0.2 0.2 - 1.3 mg/dL    Albumin 3.4 3.4 - 5.0 g/dL    Protein Total 7.3 6.8 - 8.8 g/dL    Alkaline Phosphatase 92 40 - 150 U/L    ALT 19 0 - 50 U/L    AST 14 0 - 45 U/L   Lactic acid whole blood   Result Value Ref Range    Lactic Acid 1.0 0.7 - 2.0 mmol/L   UA with Microscopic reflex to Culture   Result Value Ref Range    Color Urine Yellow     Appearance Urine Clear     Glucose Urine Negative NEG^Negative mg/dL    Bilirubin Urine Negative NEG^Negative    Ketones Urine Negative NEG^Negative mg/dL    Specific Gravity Urine 1.011 1.003 -  1.035    Blood Urine Negative NEG^Negative    pH Urine 8.0 (H) 5.0 - 7.0 pH    Protein Albumin Urine Negative NEG^Negative mg/dL    Urobilinogen mg/dL 0.0 0.0 - 2.0 mg/dL    Nitrite Urine Negative NEG^Negative    Leukocyte Esterase Urine Negative NEG^Negative    Source Unspecified Urine     WBC Urine <1 0 - 2 /HPF    RBC Urine 0 0 - 2 /HPF    Squamous Epithelial /HPF Urine 5 (H) 0 - 1 /HPF    Mucous Urine Present (A) NEG^Negative /LPF     *Note: Due to a large number of results and/or encounters for the requested time period, some results have not been displayed. A complete set of results can be found in Results Review.     Medications   fentaNYL (DURAGESIC) 25 mcg/hr 72 hr patch 1 patch (1 patch Transdermal Given 11/16/17 2557)   fentaNYL (DURAGESIC) patch REMOVAL (not administered)   fentaNYL (DURAGESIC) Patch in Place (not administered)   fentaNYL (PF) (SUBLIMAZE) injection 100 mcg (100 mcg Intravenous Given 11/16/17 1414)   HYDROmorphone (DILAUDID) injection 1 mg (1 mg Intravenous Given 11/16/17 1442)     Assessments & Plan (with Medical Decision Making)  Katy Islas is a 27 year old male who presented to the emergency department for concerns of intractable pain and requesting her wound be packed today.  She had surgery on her coccyx almost a month ago and has had issues with it since then.  It has been difficult for her to get the wound packed due to pain and lack of cooperation.  She's been in the emergency department many times since her surgery for pain and infection related issues from her surgery.  On arrival to the emergency department she was afebrile with unremarkable vital signs. She had a 2 cm x 1 cm gaping wound about 3 cm deep overlying her coccyx.  There was minimal drainage from the site and no surrounding erythema. It was quite tender to touch however. IV access obtained and labs were drawn. Her WBC count was 3.9 today. Lactate was normal and CMP unremarkable. Patient was concerned for UTI  but UA showed no evidence of this today.   For pain management, I initially considered using ketamine after discussing with Dr. Zamora. The patient was against using ketamine due to not liking side effects the last time she had it.  She then requested fentanyl.  At that point, I sat down and had a long conversation with the patient using interpretive services.  I explained that unfortunately due to complications from her surgery she will have a long road to healing.  Also, her mother is moving out of state which has caused her increased stress and emotional pain.  I explained that emotional pain can make physical pain worse.  She acknowledged that this definitely has been playing a role in things, and that she needs to find ways to cope with these changes in her life. I explained will not be able to take her pain away completely but I would like to help get it to a tolerable level.  She reported her pain was at a 7/10 currently and tolerable level is 4/10.  I expressed understanding regarding her frustration to healing and the pain related to packing the wound, but I explained that packing is important for healing and unfortunately will cause additional pain despite anyone's best efforts.  I encouraged her to use coping skills during wound packing to get through this and explained that she cannot be coming to the ER to manage pain or to have her wound packed in the future, and that we will need to find a way to do this successfully in the outpatient setting.  The patient was very understanding.  I agreed to using fentanyl initially to manage pain here, which she reported relief with but it did wear off quickly.  1 mg IV Dilaudid subsequently administered.  Nursing staff then assisted me in packing the wound with 1/2 in gauze after being flushed with 200 cc NS, and the patient tolerated this very well.   The patient reports that she has a 3 hour car ride to get to her surgeon and back tomorrow afternoon. Sitting for  that long makes pain much worse and she wants something to get her through the drive. She has 3 tablets of oxycodone left at home. I explained that we cannot refill chronic narcotic prescriptions from the emergency department.  At one point after her back surgery, she was using 25  g/hour fentanyl patches for pain relief.  She requested a patch to manage pain for this car ride tomorrow.  This was applied here in the ED but again I explained that we will not be refilling chronic narcotic prescriptions from the ED, and that for further pain management in the outpatient setting she will need to see her surgeon or primary care provider.  The patient was upset that we did not give her higher dose patch, but I explained that a 25  g patch is all she will receive here today and should provide some relief in conjunction with her remaining oxycodone. The patient expressed understanding of this and requested to go home.  She was given discharge instructions explaining when to return to the ED. She was discharged to home in suitable condition.       I have reviewed the nursing notes.    I have reviewed the findings, diagnosis, plan and need for follow up with the patient.       Discharge Medication List as of 11/16/2017  3:53 PM          Final diagnoses:   Postoperative wound dehiscence, subsequent encounter   Change or removal of wound packing     This document serves as a record of services personally performed by Marian Barton PA. It was created on their behalf by Lanette Arevalo, a trained medical scribe. The creation of this record is based on the provider's personal observations and the statements of the patient. This document has been checked and approved by the attending provider.    Note: Chart documentation done in part with Dragon Voice Recognition software. Although reviewed after completion, some word and grammatical errors may remain.    11/16/2017   [facility] EMERGENCY DEPARTMENT     Mick  Marian sEpinosa PA-C  11/16/17 6728

## 2017-11-16 NOTE — TELEPHONE ENCOUNTER
Katy Islas is a 27 year old female who calls with uncontrolled pain.    NURSING ASSESSMENT:  Description:  Still having a lot of pain. Tossing at night, decreased sleeping, sitting hurts. Has left a message for Dr Eugene, sees him on Friday. Requesting that CDL relieves her pain and prescribes. Ms cotin 2 are left and the oxycodone has 3 left.   Onset/duration:  Ongoing pain  Pain scale (0-10)   7-8/10  LMP/preg/breast feeding:  Patient's last menstrual period was 10/29/2017.  Last exam/Treatment:  11/13/2017  Allergies:   Allergies   Allergen Reactions     Ativan [Lorazepam] Hives     At the IV site     Baclofen      hives     Bees Hives, Swelling and Difficulty breathing     Caffeine      Contrast Dye      Hives,   Updated 5/10/2016 CT Contrast.     Diazepam      IV form makes her become aggressive and angry     Iodine Hives     Methocarbamol Swelling     Metoclopramide      Other reaction(s): Tremors  LW Reaction: shaking/sweating     Midazolam      Other reaction(s): Agitation     Monosodium Glutamate      Morphine Other (See Comments)     Difficulty with urination     Nsaids Other (See Comments)     GI BLEED x2     Other (Do Not Use) Other (See Comments)     Xanaflex- pt becomes disoriented and loses bladder control     Percocet [Oxycodone-Acetaminophen] Itching     Reglan [Metoclopramide Hcl] Other (See Comments)     shaking     Soma [Carisoprodol] Visual Disturbance     Sleep walking     Tizanidine      Topamax Other (See Comments)     Topamax [Topiramate] Nausea     Tingling  GI/Vomit     Tramadol      Severe Headache, Seizure Risk     Tylenol W/Codeine [Acetaminophen-Codeine] Nausea and Itching     Tylenol 3     Valium Other (See Comments)     Becomes aggressive and angry     Versed Other (See Comments)     Zolmitriptan      Makes face feel like its twitching     Droperidol Anxiety     Flu Virus Vaccine Rash      Arm swelling     NURSING PLAN: Huddle with provider, plan includes routed call to  CDL  Nohemi Jennings RN, BSN

## 2017-11-16 NOTE — ED AVS SNAPSHOT
[facility] Emergency Department    [address] [place] [address]    Phone:  [phone]    Fax:  [phone]                                       Katy Islas   MRN: [id number]    Department:  [facility] Emergency Department   Date of Visit:  11/16/2017           Patient Information     Date Of Birth          1990        Your diagnoses for this visit were:     Postoperative wound dehiscence, subsequent encounter     Change or removal of wound packing        You were seen by aMrian Barton PA-C.      Follow-up Information     Follow up with Aura Rosario PA-C.    Specialty:  Physician Assistant - Medical    Why:  Routinely for wound follow up, pain management    Contact information:    [address]  [facility] [address]  [phone]          Follow up with [facility] Emergency Department.    Specialty:  EMERGENCY MEDICINE    Why:  If symptoms worsen    Contact information:    [address]   [place] [address] [phone]    Additional information:    From [address]: Exit at [address] on [address] side of [place]. Turn right on [address]. Turn left at stoplight on [address]. [facility] will be in view two blocks ahead        Discharge Instructions       Please follow-up at your scheduled appointment with your surgeon tomorrow.  It is important that you are cooperative with further wound packing changes because this will help things heal- I know it hurts, but use coping skills like you did today (you did great!). For future pain medications, you will need to see your surgeon and/or primary care provider. I encourage you to stay positive during this healing process, it will get better with time.     If you develop fever, chills, or worsening symptoms, please return to the emergency department.    Thank you for choosing [facility] Emergency Department. It was a pleasure taking care of you  today. If you have any questions, please call [phone].    Marian Barton PA-C    Wound Care After Surgery: Pain  Surgery involves cutting through layers of skin, fatty tissue, muscle, and sometimes bone and cartilage. Sutures (stitches) or staples are used to close all layers of the wound. The sutures on the inside will dissolve in about 2 to 3 weeks. Any sutures or staples used on the outside need to be removed in about 7 to 14 days, depending on the location.  It is normal to feel pain at the incision site. The pain decreases as the wound heals. Most of the pain and soreness where the skin was cut should go away by the time the sutures or staples are removed. Soreness and pain from deeper tissues may last another week or two.  Pain that continues more than a few weeks after surgery or pain that worsens anytime after surgery can be a sign of a problem, such as:    Infection    Separation of wound edges    Collection of blood or fluid below the skin  Home care  Different types of surgery require different types of care and dressing changes. It is important to follow all instructions and advice from your surgeon, as well as other members of your healthcare team.  Wound care    Keep the wound clean, as directed by your healthcare provider.    Change the dressing as directed. Change the dressing or sooner if it becomes wet or stained with blood or fluid from the wound.    Bathe with a sponge (no shower or tub baths) for the first few days after surgery, or until there is no more drainage from the wound. Unless you received different instructions from your surgeon, you can then shower. Do not soak the area in water (no baths or swimming) until the sutures, staples, or butterfly bandages are removed and any wound opening has dried out and healed.  Changing the dressing    Wash your hands before changing the dressings.    Carefully remove the dressing and tape; don t just yank it off. If it sticks to the wound, you  may need to wet it a little to remove it, unless your healthcare provider told you not to wet it.    Wash your hands again before putting on a new, clean dressing.    Gently clean the wound with clean water (or saline) using gauze or a clean washcloth. Do not rub it or pick at it.    Do not use soap, alcohol, hydrogen peroxide, or any other cleanser.    If you were told to dry the wound before putting on a new dressing, gently pat it dry. Do not rub.    Throw out the old dressing. Do not reuse it!    Wash your hands again when you are done.  Types of dressings  Your healthcare team will tell you what type of dressing to put on your wound. Follow your healthcare team s instructions carefully, and contact them if you have any questions. Two common types of dressings are described below. You may have one of these or another type.    Dry dressing. Use dry gauze. If the wound is still draining, use a nonadherent dressing, which shouldn t stick to the wound.    Wet-to-dry dressing. Wet the gauze, and squeeze out the excess water (or saline), before putting it on. Then, cover this with a dry pad.  Medicines    If you were given antibiotics, take them until they are used up or your healthcare provider tells you to stop. It is important to finish the antibiotics even though you feel better, to make sure the infection has cleared.    You can take acetaminophen or ibuprofen for pain, unless you were given a different pain medicine to use. (Note: If you have chronic liver or kidney disease, have ever had a stomach ulcer or gastrointestinal bleeding, or are taking blood thinner medicines, talk with your healthcare provider before using these medicines.)    Aspirin should never be used in anyone under 18 years of age who is ill with a fever. It may cause severe liver damage.  Follow-up care  Follow up with your healthcare provider, or as advised, for your next wound check or removal of your sutures, staples, or tape.    If a  culture was done, you will be notified if the results will affect your treatment. You can call as directed for the results.    If imaging tests, such as X-rays, an ultrasound, or CT scan were done, they will be reviewed by a specialist. You will be notified of the results, especially if they affect treatment.  Call 911  Call emergency services right away if any of these occur:    Trouble breathing or swallowing, wheezing    Hoarse voice or trouble speaking    Extreme confusion    Extreme drowsiness or trouble awakening    Fainting or loss of consciousness    Rapid heart rate or very slow heart rate    Vomiting blood, or large amounts of blood in stool    Discomfort in the center of the chest that feels like pressure, squeezing, a sense of fullness, or pain.    Discomfort or pain in other upper body areas, such as the back, one or both arms, neck, jaw, or stomach    Stroke 911 symptoms (spot a stroke  FAST )    F: Face drooping. One side of the face is numb or droops.    A: Arm weakness. One arm feels weak or numb.    S: Speech difficulty. Speech is slurred, or the person is unable to speak.    T: Time to call 911. Even if the symptoms go away, call 911.  When to seek medical advice  Call your healthcare provider right away if any of the following occur:    Increasing pain at the site of surgery    Fever over 100.4  F (38  C)    Redness around the wound    Fluid, pus, or blood draining from the wound    Vomiting, constipation, or diarrhea        24 Hour Appointment Hotline       To make an appointment at any [facility], call [phone] ([phone]). If you don't have a family doctor or clinic, we will help you find one. [facility] are conveniently located to serve the needs of you and your family.             Review of your medicines      Our records show that you are taking the medicines listed below. If these are incorrect, please call your family doctor or clinic.        Dose / Directions Last  dose taken    * albuterol 108 (90 BASE) MCG/ACT Inhaler   Commonly known as:  PROAIR HFA/PROVENTIL HFA/VENTOLIN HFA   Dose:  2 puff   Quantity:  1 Inhaler        Inhale 2 puffs into the lungs every 6 hours as needed for shortness of breath / dyspnea or wheezing   Refills:  1        * albuterol (2.5 MG/3ML) 0.083% neb solution   Dose:  1 vial   Quantity:  50 vial        Take 1 vial (2.5 mg) by nebulization every 6 hours as needed for shortness of breath / dyspnea or wheezing   Refills:  1        ARIPiprazole 5 MG tablet   Commonly known as:  ABILIFY   Dose:  5 mg   Quantity:  30 tablet        Take 1 tablet (5 mg) by mouth At Bedtime   Refills:  0        cephALEXin 500 MG capsule   Commonly known as:  KEFLEX        Refills:  0        * cetirizine 10 MG tablet   Commonly known as:  zyrTEC   Dose:  10 mg        Take 10 mg by mouth daily   Refills:  0        * cetirizine 10 MG tablet   Commonly known as:  zyrTEC   Dose:  10 mg   Quantity:  90 tablet        Take 1 tablet (10 mg) by mouth every evening   Refills:  3        cyclobenzaprine 10 MG tablet   Commonly known as:  FLEXERIL   Dose:  20 mg   Quantity:  60 tablet        Take 2 tablets (20 mg) by mouth nightly as needed for muscle spasms or other (back pain)   Refills:  3        dexlansoprazole 60 MG Cpdr CR capsule   Commonly known as:  DEXILANT   Dose:  60 mg   Quantity:  30 capsule        Take 1 capsule (60 mg) by mouth daily   Refills:  3        diazepam 5 MG tablet   Commonly known as:  VALIUM   Dose:  2.5-5 mg   Quantity:  60 tablet        Take 0.5-1 tablets (2.5-5 mg) by mouth every 12 hours as needed for anxiety or sleep (MUSCLE SPASM) (one month supply)   Refills:  2        dicyclomine 20 MG tablet   Commonly known as:  BENTYL   Dose:  20 mg   Quantity:  30 tablet        Take 1 tablet (20 mg) by mouth 4 times daily as needed (ABDOMINAL CRAMPING) AS NEEDED FOR CRAMPING   Refills:  3        diphenoxylate-atropine 2.5-0.025 MG per tablet   Commonly known as:   LOMOTIL   Dose:  1 tablet   Quantity:  10 tablet        Take 1 tablet by mouth 4 times daily as needed for diarrhea   Refills:  0        DULoxetine 60 MG EC capsule   Commonly known as:  CYMBALTA   Dose:  60 mg   Quantity:  30 capsule        Take 1 capsule (60 mg) by mouth daily   Refills:  3        EPINEPHrine 0.3 MG/0.3ML injection 2-pack   Commonly known as:  EPIPEN/ADRENACLICK/or ANY BX GENERIC EQUIV   Dose:  0.3 mg   Quantity:  0.6 mL        Inject 0.3 mLs (0.3 mg) into the muscle once as needed for anaphylaxis   Refills:  1        FLEX-A-MIN JOINT FLEX PO        Take by mouth daily Reported on 3/28/2017   Refills:  0        gabapentin 300 MG capsule   Commonly known as:  NEURONTIN   Dose:  300 mg   Quantity:  90 capsule        Take 1 capsule (300 mg) by mouth 3 times daily   Refills:  3        meclizine 12.5 MG tablet   Commonly known as:  ANTIVERT   Dose:  12.5 mg   Quantity:  30 tablet        Take 1 tablet (12.5 mg) by mouth 4 times daily as needed for dizziness   Refills:  3        medical cannabis inhalation (Patient's own supply.  Not a prescription)        Inhale into the lungs 3 times daily as needed Reported on 3/28/2017   Refills:  0        medroxyPROGESTERone 150 MG/ML injection   Commonly known as:  DEPO-PROVERA   Dose:  150 mg   Quantity:  3 mL        Inject 1 mL (150 mg) into the muscle every 3 months   Refills:  3        morphine 15 MG 12 hr tablet   Commonly known as:  MS CONTIN   Dose:  15 mg   Quantity:  10 tablet        Take 1 tablet (15 mg) by mouth every 12 hours maximum 2 tablet(s) per day   Refills:  0        Multi-vitamin Tabs tablet   Dose:  1 tablet   Generic drug:  multivitamin, therapeutic with minerals        Take 1 tablet by mouth daily Reported on 3/28/2017   Refills:  0        naloxone nasal spray   Commonly known as:  NARCAN   Dose:  4 mg   Quantity:  0.2 mL        Spray 1 spray (4 mg) into one nostril alternating nostrils as needed for opioid reversal every 2-3 minutes until  assistance arrives   Refills:  0        NONFORMULARY   Dose:  30 mL   Indication:  Marina Topical Gel Cream, 1 to 4 pumps  to affect area 4 times daily        Apply 30 mLs topically 4 times daily   Refills:  0        ondansetron 4 MG ODT tab   Commonly known as:  ZOFRAN ODT   Dose:  4-8 mg   Quantity:  60 tablet        Take 1-2 tablets (4-8 mg) by mouth every 6 hours as needed for nausea Take 1-2 tablets by mouth as needed for nausea   Refills:  3        order for DME   Quantity:  1 each        Nebulizer with mask and tubing   Refills:  0        oxyCODONE IR 5 MG tablet   Commonly known as:  ROXICODONE   Dose:  5 mg   Quantity:  18 tablet        Take 1 tablet (5 mg) by mouth every 4 hours as needed for pain maximum 4 tablet(s) per day   Refills:  0        polyethylene glycol Packet   Commonly known as:  MIRALAX/GLYCOLAX   Dose:  1 packet        Take 1 packet by mouth daily   Refills:  0        promethazine 6.25 MG/5ML syrup   Commonly known as:  PHENERGAN   Dose:  12.5 mg   Quantity:  560 mL        Take 10 mLs (12.5 mg) by mouth 4 times daily as needed for nausea   Refills:  0        rizatriptan 5 MG ODT tab   Commonly known as:  MAXALT-MLT   Dose:  5-10 mg   Quantity:  18 tablet        Take 1-2 tablets (5-10 mg) by mouth at onset of headache for migraine May repeat in 2 hours. Max 6 tablets/24 hours.   Refills:  3        senna-docusate 8.6-50 MG per tablet   Commonly known as:  SENOKOT-S;PERICOLACE   Dose:  1 tablet   Quantity:  60 tablet        Take 1 tablet by mouth 2 times daily as needed for constipation   Refills:  3        * Notice:  This list has 4 medication(s) that are the same as other medications prescribed for you. Read the directions carefully, and ask your doctor or other care provider to review them with you.            Procedures and tests performed during your visit     CBC with platelets differential    Comprehensive metabolic panel    Lactic acid whole blood    Peripheral IV: Standard    UA with  Microscopic reflex to Culture      Orders Needing Specimen Collection     None      Pending Results     No orders found from 11/14/2017 to 11/17/2017.            Pending Culture Results     No orders found from 11/14/2017 to 11/17/2017.            Pending Results Instructions     If you had any lab results that were not finalized at the time of your Discharge, you can call the ED Lab Result RN at [phone]. You will be contacted by this team for any positive Lab results or changes in treatment. The nurses are available 7 days a week from 10A to 6:30P.  You can leave a message 24 hours per day and they will return your call.        Thank you for choosing [facility]       Thank you for choosing [facility] for your care. Our goal is always to provide you with excellent care. Hearing back from our patients is one way we can continue to improve our services. Please take a few minutes to complete the written survey that you may receive in the mail after you visit with us. Thank you!        [employer] Information     [facility] gives you secure access to your electronic health record. If you see a primary care provider, you can also send messages to your care team and make appointments. If you have questions, please call your primary care clinic.  If you do not have a primary care provider, please call [phone] and they will assist you.        Care EveryWhere ID     This is your Care EveryWhere ID. This could be used by other organizations to access your [facility] medical records  [id number]        Equal Access to Services     VIKY VILLANUEVA : Hadii pilo Chen, wajonnie kelly, qasusan lopez. So [facility] [phone].    ATENCIÓN: Si habla español, tiene a kerr disposición servicios gratuitos de asistencia lingüística. Llame al [phone].    We comply with applicable federal civil rights laws and [place] laws. We do not discriminate on the basis of race, color,  national origin, age, disability, sex, sexual orientation, or gender identity.            After Visit Summary       This is your record. Keep this with you and show to your community pharmacist(s) and doctor(s) at your next visit.

## 2017-11-16 NOTE — ED AVS SNAPSHOT
[facility] Emergency Department    [address] [place] [address]    Phone:  [phone]    Fax:  [phone]                                       Katy Islas   MRN: [id number]    Department:  [facility] Emergency Department   Date of Visit:  11/16/2017           After Visit Summary Signature Page     I have received my discharge instructions, and my questions have been answered. I have discussed any challenges I see with this plan with the nurse or doctor.    ..........................................................................................................................................  Patient/Patient Representative Signature      ..........................................................................................................................................  Patient Representative Print Name and Relationship to Patient    ..................................................               ................................................  Date                                            Time    ..........................................................................................................................................  Reviewed by Signature/Title    ...................................................              ..............................................  Date                                                            Time

## 2017-11-18 ENCOUNTER — MYC REFILL (OUTPATIENT)
Dept: FAMILY MEDICINE | Facility: OTHER | Age: 27
End: 2017-11-18

## 2017-11-18 ENCOUNTER — MYC MEDICAL ADVICE (OUTPATIENT)
Dept: FAMILY MEDICINE | Facility: OTHER | Age: 27
End: 2017-11-18

## 2017-11-18 DIAGNOSIS — G43.109 MIGRAINE WITH AURA AND WITHOUT STATUS MIGRAINOSUS, NOT INTRACTABLE: ICD-10-CM

## 2017-11-18 DIAGNOSIS — R42 DIZZINESS: ICD-10-CM

## 2017-11-20 RX ORDER — RIZATRIPTAN BENZOATE 5 MG/1
5-10 TABLET, ORALLY DISINTEGRATING ORAL
Qty: 18 TABLET | Refills: 3 | Status: SHIPPED | OUTPATIENT
Start: 2017-11-20 | End: 2018-01-29

## 2017-11-20 RX ORDER — MECLIZINE HCL 12.5 MG 12.5 MG/1
12.5 TABLET ORAL 4 TIMES DAILY PRN
Qty: 30 TABLET | Refills: 1 | Status: SHIPPED | OUTPATIENT
Start: 2017-11-20 | End: 2018-08-21

## 2017-11-20 NOTE — TELEPHONE ENCOUNTER
TC - please see if there is psychiatrist in [facility]. Last I heard, [facility] wasn't taking new patients.     Please thank latonia for her message and updates, but she should talk to Dr. Millan about the prior auth and money issues. She should also get refills on medications from him.     Recheck mood in 3-4 weeks.    Zach Rosario PA-C  [facility]

## 2017-11-20 NOTE — TELEPHONE ENCOUNTER
Message from [employer]:  Original authorizing provider: LOVE Bishop would like a refill of the following medications:  rizatriptan (MAXALT-MLT) 5 MG ODT tab [Aura Rosario PA-C]  meclizine (ANTIVERT) 12.5 MG tablet [Aura Rosario PA-C]    Preferred pharmacy: Other - [facility]    Comment:  Need few fills and I need more liquid promethazine really soon; I do not see it on the list here. I have like 20 mL left and I get nausea often from the antibiotics and pain medications. I would like all my meds transfer to [facility] if it is okay with you, Zach? Is closer. Thank you! Just message or call me if you have questions.

## 2017-11-21 ENCOUNTER — MYC MEDICAL ADVICE (OUTPATIENT)
Dept: FAMILY MEDICINE | Facility: OTHER | Age: 27
End: 2017-11-21

## 2017-11-21 ENCOUNTER — TELEPHONE (OUTPATIENT)
Dept: FAMILY MEDICINE | Facility: OTHER | Age: 27
End: 2017-11-21

## 2017-11-21 NOTE — TELEPHONE ENCOUNTER
Not finding anywhere in [facility]. Sent patient [employer] message. To see if her insurance will give her locations for us to call to see if accepting new patients and also gave her the information from Zach.

## 2017-11-21 NOTE — TELEPHONE ENCOUNTER
Requested Prescriptions   Pending Prescriptions Disp Refills     rizatriptan (MAXALT-MLT) 5 MG ODT tab 18 tablet 3     Sig: Take 1-2 tablets (5-10 mg) by mouth at onset of headache for migraine May repeat in 2 hours. Max 6 tablets/24 hours.    Serotonin Agonists Failed    11/20/2017  8:07 AM       Failed - Blood pressure under 140/90    BP Readings from Last 3 Encounters:   11/16/17 143/86   11/13/17 124/78   11/09/17 130/84                Failed - Serotonin Agonist request needs review.    Please review patient's record. If patient has had 8 or more treatments in the past month, please forward to provider.         Passed - Recent or future visit with authorizing provider's specialty    Patient had office visit in the last year or has a visit in the next 30 days with authorizing provider.  See chart review.              Passed - Patient is age 18 or older       Passed - No active pregnancy on record       Passed - No positive pregnancy test in past 12 months        meclizine (ANTIVERT) 12.5 MG tablet 30 tablet 3     Sig: Take 1 tablet (12.5 mg) by mouth 4 times daily as needed for dizziness     Antivertigo/Antiemetic Agents Passed    11/20/2017  8:07 AM       Passed - Recent or future visit with authorizing provider's specialty    Patient had office visit in the last year or has a visit in the next 30 days with authorizing provider.  See chart review.              Passed - Patient is 18 years of age or older        rizatriptan (MAXALT-MLT) 5 MG ODT tab  Routing refill request to provider for review/approval because:  Drug interaction warning with Zolmitriptan       BP Readings from Last 3 Encounters:   11/16/17 143/86   11/13/17 124/78   11/09/17 130/84     meclizine (ANTIVERT) 12.5 MG tablet  Medication is being filled for 1 time refill only due to:  Patient needs to be seen because due for OV in 3-4 weeks for mood.    Nohemi Jennings, RN, BSN

## 2017-11-21 NOTE — TELEPHONE ENCOUNTER
Medication(s) reviewed and approved. Rx. was faxed to the designated pharmacy.      Please notify that this has been done.      Please close the encounter when finished with it.     Aura Rosario PA-C

## 2017-11-21 NOTE — TELEPHONE ENCOUNTER
Katy Islas is a 27 year old female who presents with pain with urination.    NURSING ASSESSMENT:  Description:  Pt walked into the clinic today wanting to be seen for a possible UTI.    Onset/duration:  3-4 days  Precip. factors:  none  Associated symptoms:  Pain/burning with urination, 99.6 temp.  Denies blood in urine, abdominal pain, urinary urgency or frequency.  Improves/worsens symptoms:  Hasn't tried anything      Allergies:   Allergies   Allergen Reactions     Ativan [Lorazepam] Hives     At the IV site     Baclofen      hives     Bees Hives, Swelling and Difficulty breathing     Caffeine      Contrast Dye      Hives,   Updated 5/10/2016 CT Contrast.     Diazepam      IV form makes her become aggressive and angry     Iodine Hives     Methocarbamol Swelling     Metoclopramide      Other reaction(s): Tremors  LW Reaction: shaking/sweating     Midazolam      Other reaction(s): Agitation     Monosodium Glutamate      Morphine Other (See Comments)     Difficulty with urination     Nsaids Other (See Comments)     GI BLEED x2     Other (Do Not Use) Other (See Comments)     Xanaflex- pt becomes disoriented and loses bladder control     Percocet [Oxycodone-Acetaminophen] Itching     Reglan [Metoclopramide Hcl] Other (See Comments)     shaking     Soma [Carisoprodol] Visual Disturbance     Sleep walking     Tizanidine      Topamax Other (See Comments)     Topamax [Topiramate] Nausea     Tingling  GI/Vomit     Tramadol      Severe Headache, Seizure Risk     Tylenol W/Codeine [Acetaminophen-Codeine] Nausea and Itching     Tylenol 3     Valium Other (See Comments)     Becomes aggressive and angry     Versed Other (See Comments)     Zolmitriptan      Makes face feel like its twitching     Droperidol Anxiety     Flu Virus Vaccine Rash      Arm swelling       RECOMMENDED DISPOSITION: No appointments available in [facility] today.  Advised pt to go to urgent care or [facility].  Pt did not want to do this.  I  did put her on JM's schedule tomorrow but it is a short appt.  I did warn pt that JM might not be able to see her without an  and that her appointment isn't long enough for an .  I will send a message to JM to see if he is able to see pt for a possible UTI tomorrow in a 15 minute slot.  I also advised pt that he would only be able to see her for possible UTI, no other symptoms.  Pt is in agreeance with this.  Will call pt at [phone] to cancel appt if MAIA isn't able to see her.  Will comply with recommendation: Yes  If further questions/concerns or if symptoms do not improve, worsen or new symptoms develop, call your PCP or [facility] as soon as possible.      Guideline used: urination, painful  Telephone Triage Protocols for Nurses, Fifth Edition, Telma Garcia RN

## 2017-11-22 ENCOUNTER — OFFICE VISIT (OUTPATIENT)
Dept: FAMILY MEDICINE | Facility: OTHER | Age: 27
End: 2017-11-22
Payer: MEDICARE

## 2017-11-22 VITALS
RESPIRATION RATE: 12 BRPM | SYSTOLIC BLOOD PRESSURE: 130 MMHG | WEIGHT: 156 LBS | TEMPERATURE: 97.9 F | OXYGEN SATURATION: 99 % | DIASTOLIC BLOOD PRESSURE: 64 MMHG | BODY MASS INDEX: 28.53 KG/M2 | HEART RATE: 84 BPM

## 2017-11-22 DIAGNOSIS — R11.0 NAUSEA: ICD-10-CM

## 2017-11-22 DIAGNOSIS — R33.9 URINARY RETENTION: Primary | ICD-10-CM

## 2017-11-22 LAB
ALBUMIN UR-MCNC: NEGATIVE MG/DL
APPEARANCE UR: CLEAR
BILIRUB UR QL STRIP: NEGATIVE
COLOR UR AUTO: YELLOW
GLUCOSE UR STRIP-MCNC: NEGATIVE MG/DL
HGB UR QL STRIP: NEGATIVE
KETONES UR STRIP-MCNC: NEGATIVE MG/DL
LEUKOCYTE ESTERASE UR QL STRIP: NEGATIVE
NITRATE UR QL: NEGATIVE
PH UR STRIP: 7.5 PH (ref 5–7)
SOURCE: ABNORMAL
SP GR UR STRIP: 1.01 (ref 1–1.03)
UROBILINOGEN UR STRIP-ACNC: 0.2 EU/DL (ref 0.2–1)

## 2017-11-22 PROCEDURE — 99213 OFFICE O/P EST LOW 20 MIN: CPT | Performed by: PHYSICIAN ASSISTANT

## 2017-11-22 PROCEDURE — 81003 URINALYSIS AUTO W/O SCOPE: CPT | Performed by: PHYSICIAN ASSISTANT

## 2017-11-22 ASSESSMENT — PAIN SCALES - GENERAL: PAINLEVEL: MILD PAIN (3)

## 2017-11-22 NOTE — PATIENT INSTRUCTIONS
I think your symptoms are from all the pain medications so I recommend taking this sparingly.  Make sure you are drinking plenty of fluids.  If you have any worsening symptoms, let me know.

## 2017-11-22 NOTE — PROGRESS NOTES
SUBJECTIVE:                                                    Katy Islas is a 27 year old female who presents to clinic today for the following health issues:      HPI    URINARY TRACT SYMPTOMS  Onset: 4-5 days    Description:   Painful urination (Dysuria): no  Blood in urine (Hematuria): no  Delay in urine (Hesitency): YES some    Intensity: mild    Progression of Symptoms:  same    Accompanying Signs & Symptoms:  Fever/chills: YES- 99.6  Flank pain maybe due to pain from tailbone surgery   Nausea and vomiting: YES- nausea yes but only vomited once yesterday  Any vaginal symptoms: vaginal itching  Abdominal/Pelvic Pain: no     History:   History of frequent UTI's: YES  History of kidney stones: YES  Sexually Active: no   Possibility of pregnancy: No    Precipitating factors:   nothing    Therapies Tried and outcome: cranberry juice, increased water      She took Diflucan recently when on antibiotics. She states it is more difficulty to urinate and it wondering if this is from her increased pain medications. She was started on MS Contin last week by her PCP along with continuing her oxycodone and then her surgeon Dr. Millan changed this to OxyContin.     Problem list and histories reviewed & adjusted, as indicated.  Additional history: none    ROS:  GENERAL: Denies fever, fatigue, weakness, weight gain, or weight loss..   CARDIOVASCULAR: Denies chest pain, shortness of breath, irregular heartbeats,  palpitations, or edema.  RESPIRATORY: Denies cough, hemoptysis, and shortness of breath.  GASTROINTESTINAL: Denies nausea, vomiting, change in appetite, abdominal pain, diarrhea, or constipation.  GENITOURINARY: +Urinary retention, difficulty starting. Denies increased frequency, urgency, dysuria, hematuria, or incontinence.     OBJECTIVE:     /64  Pulse 84  Temp 97.9  F (36.6  C) (Temporal)  Resp 12  Wt 156 lb (70.8 kg)  LMP 10/29/2017  SpO2 99%  BMI 28.53 kg/m2  Body mass index is 28.53  kg/(m^2).  GENERAL: healthy, alert and no distress  RESP: lungs clear to auscultation - no rales, rhonchi or wheezes  CV: regular rate and rhythm, normal S1 S2, no S3 or S4, no murmur, click or rub  ABDOMEN: No abdominal pain with normal bowel sounds throughout.   BACK: No CVA tenderness.    Results for orders placed or performed in visit on 11/22/17   *UA reflex to Microscopic and Culture ([facility] (except [place] and [place])   Result Value Ref Range    Color Urine Yellow     Appearance Urine Clear     Glucose Urine Negative NEG^Negative mg/dL    Bilirubin Urine Negative NEG^Negative    Ketones Urine Negative NEG^Negative mg/dL    Specific Gravity Urine 1.015 1.003 - 1.035    Blood Urine Negative NEG^Negative    pH Urine 7.5 (H) 5.0 - 7.0 pH    Protein Albumin Urine Negative NEG^Negative mg/dL    Urobilinogen Urine 0.2 0.2 - 1.0 EU/dL    Nitrite Urine Negative NEG^Negative    Leukocyte Esterase Urine Negative NEG^Negative    Source Unspecified Urine      *Note: Due to a large number of results and/or encounters for the requested time period, some results have not been displayed. A complete set of results can be found in Results Review.       ASSESSMENT/PLAN:       ICD-10-CM    1. Urinary retention R33.9 *UA reflex to Microscopic and Culture ([place] and [facility] (except [place] and [place])   2. Nausea R11.0 promethazine (PHENERGAN) 6.25 MG/5ML syrup       UA is normal.  Symptoms likely due to recent addition of long-acting narcotic. I recommend she discuss the dosage with her surgeon and continue to drink plenty of fluids.  If symptoms worsen, she will contact the clinic.    Will refill Phenergan to use as needed for nausea.     Bob Monson PA-C  [facility]

## 2017-11-22 NOTE — MR AVS SNAPSHOT
After Visit Summary   11/22/2017    Katy Islas    MRN: [id number]           Patient Information     Date Of Birth          1990        Visit Information        Provider Department      11/22/2017 10:15 AM Bob Monson PA-C; [facility]        Today's Diagnoses     Urinary retention    -  1    Nausea          Care Instructions    I think your symptoms are from all the pain medications so I recommend taking this sparingly.  Make sure you are drinking plenty of fluids.  If you have any worsening symptoms, let me know.              Follow-ups after your visit        Who to contact     If you have questions or need follow up information about today's clinic visit or your schedule please contact [facility] directly at [phone].  Normal or non-critical lab and imaging results will be communicated to you by [employer], letter or phone within 4 business days after the clinic has received the results. If you do not hear from us within 7 days, please contact the clinic through [employer] or phone. If you have a critical or abnormal lab result, we will notify you by phone as soon as possible.  Submit refill requests through [employer] or call your pharmacy and they will forward the refill request to us. Please allow 3 business days for your refill to be completed.          Additional Information About Your Visit        [employer] Information     [employer] gives you secure access to your electronic health record. If you see a primary care provider, you can also send messages to your care team and make appointments. If you have questions, please call your primary care clinic.  If you do not have a primary care provider, please call [phone] and they will assist you.        Care EveryWhere ID     This is your Care EveryWhere ID. This could be used by other organizations to access your [facility] medical records  [id number]        Your Vitals Were     Pulse  Temperature Respirations Last Period Pulse Oximetry BMI (Body Mass Index)    84 97.9  F (36.6  C) (Temporal) 12 10/29/2017 99% 28.53 kg/m2       Blood Pressure from Last 3 Encounters:   11/22/17 130/64   11/16/17 143/86   11/13/17 124/78    Weight from Last 3 Encounters:   11/22/17 156 lb (70.8 kg)   11/13/17 145 lb (65.8 kg)   11/09/17 146 lb (66.2 kg)              We Performed the Following     *UA reflex to Microscopic and Culture ([place] [facility] (except [place] and [place])          Where to get your medicines      These medications were sent to [facility] [address] [place] [address] [address] [facility] [address]  [address] [facility] [address]     Phone:  [phone]     promethazine 6.25 MG/5ML syrup          Primary Care Provider Office Phone # Fax #    Aura Rosario PA-C [phone]       [address] [facility] [address]  [facility] [address]        Equal Access to Services     VIKY VILLANUEVA : Hadii pilo bishop hadasho Soomaali, waaxda luqadaha, qaybta kaalmada adeegyada, susan turner. So [facility] [phone].    ATENCIÓN: Si habla español, tiene a kerr disposición servicios gratuitos de asistencia lingüística. Llame al [phone].    We comply with applicable federal civil rights laws and [place] laws. We do not discriminate on the basis of race, color, national origin, age, disability, sex, sexual orientation, or gender identity.            Thank you!     Thank you for choosing [facility]  for your care. Our goal is always to provide you with excellent care. Hearing back from our patients is one way we can continue to improve our services. Please take a few minutes to complete the written survey that you may receive in the mail after your visit with us. Thank you!             Your Updated Medication List - Protect others around you: Learn how to safely use, store and throw away your  medicines at www.disposemymeds.org.          This list is accurate as of: 11/22/17 11:04 AM.  Always use your most recent med list.                   Brand Name Dispense Instructions for use Diagnosis    * albuterol 108 (90 BASE) MCG/ACT Inhaler    PROAIR HFA/PROVENTIL HFA/VENTOLIN HFA    1 Inhaler    Inhale 2 puffs into the lungs every 6 hours as needed for shortness of breath / dyspnea or wheezing    Mild persistent asthma without complication       * albuterol (2.5 MG/3ML) 0.083% neb solution     50 vial    Take 1 vial (2.5 mg) by nebulization every 6 hours as needed for shortness of breath / dyspnea or wheezing    Mild persistent asthma without complication       ARIPiprazole 5 MG tablet    ABILIFY    30 tablet    Take 1 tablet (5 mg) by mouth At Bedtime    Adjustment disorder with mixed anxiety and depressed mood, Anxiety       cephALEXin 500 MG capsule    KEFLEX          * cetirizine 10 MG tablet    zyrTEC     Take 10 mg by mouth daily        * cetirizine 10 MG tablet    zyrTEC    90 tablet    Take 1 tablet (10 mg) by mouth every evening    Chronic seasonal allergic rhinitis due to pollen       cyclobenzaprine 10 MG tablet    FLEXERIL    60 tablet    Take 2 tablets (20 mg) by mouth nightly as needed for muscle spasms or other (back pain)    Chronic bilateral low back pain with left-sided sciatica       dexlansoprazole 60 MG Cpdr CR capsule    DEXILANT    30 capsule    Take 1 capsule (60 mg) by mouth daily    Gastroesophageal reflux disease without esophagitis       diazepam 5 MG tablet    VALIUM    60 tablet    Take 0.5-1 tablets (2.5-5 mg) by mouth every 12 hours as needed for anxiety or sleep (MUSCLE SPASM) (one month supply)    Chronic pain syndrome, Chronic bilateral low back pain with left-sided sciatica, Lumbar disc disease with radiculopathy, S/P lumbar fusion       dicyclomine 20 MG tablet    BENTYL    30 tablet    Take 1 tablet (20 mg) by mouth 4 times daily as needed (ABDOMINAL CRAMPING) AS NEEDED  FOR CRAMPING    Hx of Crohn's disease       diphenoxylate-atropine 2.5-0.025 MG per tablet    LOMOTIL    10 tablet    Take 1 tablet by mouth 4 times daily as needed for diarrhea        DULoxetine 60 MG EC capsule    CYMBALTA    30 capsule    Take 1 capsule (60 mg) by mouth daily    Adjustment disorder with mixed anxiety and depressed mood       EPINEPHrine 0.3 MG/0.3ML injection 2-pack    EPIPEN/ADRENACLICK/or ANY BX GENERIC EQUIV    0.6 mL    Inject 0.3 mLs (0.3 mg) into the muscle once as needed for anaphylaxis    Bee sting allergy       FLEX-A-MIN JOINT FLEX PO      Take by mouth daily Reported on 3/28/2017        gabapentin 300 MG capsule    NEURONTIN    90 capsule    Take 1 capsule (300 mg) by mouth 3 times daily    Chronic bilateral low back pain with left-sided sciatica       meclizine 12.5 MG tablet    ANTIVERT    30 tablet    Take 1 tablet (12.5 mg) by mouth 4 times daily as needed for dizziness    Dizziness       medical cannabis inhalation (Patient's own supply.  Not a prescription)      Inhale into the lungs 3 times daily as needed Reported on 3/28/2017        medroxyPROGESTERone 150 MG/ML injection    DEPO-PROVERA    3 mL    Inject 1 mL (150 mg) into the muscle every 3 months    Encounter for surveillance of injectable contraceptive, Dysmenorrhea       morphine 15 MG 12 hr tablet    MS CONTIN    10 tablet    Take 1 tablet (15 mg) by mouth every 12 hours maximum 2 tablet(s) per day    Chronic pain syndrome, Chronic bilateral low back pain with left-sided sciatica, S/P lumbar fusion, Lumbar disc disease with radiculopathy       Multi-vitamin Tabs tablet   Generic drug:  multivitamin, therapeutic with minerals      Take 1 tablet by mouth daily Reported on 3/28/2017        naloxone nasal spray    NARCAN    0.2 mL    Spray 1 spray (4 mg) into one nostril alternating nostrils as needed for opioid reversal every 2-3 minutes until assistance arrives    Chronic pain syndrome, Chronic bilateral low back pain with  left-sided sciatica, S/P lumbar fusion, Lumbar disc disease with radiculopathy       NONFORMULARY      Apply 30 mLs topically 4 times daily        ondansetron 4 MG ODT tab    ZOFRAN ODT    60 tablet    Take 1-2 tablets (4-8 mg) by mouth every 6 hours as needed for nausea Take 1-2 tablets by mouth as needed for nausea    Nausea       order for DME     1 each    Nebulizer with mask and tubing    Mild persistent asthma without complication       oxyCODONE IR 5 MG tablet    ROXICODONE    18 tablet    Take 1 tablet (5 mg) by mouth every 4 hours as needed for pain maximum 4 tablet(s) per day    Chronic pain syndrome, Chronic bilateral low back pain with left-sided sciatica, S/P lumbar fusion, Lumbar disc disease with radiculopathy       polyethylene glycol Packet    MIRALAX/GLYCOLAX     Take 1 packet by mouth daily        promethazine 6.25 MG/5ML syrup    PHENERGAN    560 mL    Take 10 mLs (12.5 mg) by mouth 4 times daily as needed for nausea    Nausea       rizatriptan 5 MG ODT tab    MAXALT-MLT    18 tablet    Take 1-2 tablets (5-10 mg) by mouth at onset of headache for migraine May repeat in 2 hours. Max 6 tablets/24 hours.    Migraine with aura and without status migrainosus, not intractable       senna-docusate 8.6-50 MG per tablet    SENOKOT-S;PERICOLACE    60 tablet    Take 1 tablet by mouth 2 times daily as needed for constipation    Drug-induced constipation       * Notice:  This list has 4 medication(s) that are the same as other medications prescribed for you. Read the directions carefully, and ask your doctor or other care provider to review them with you.

## 2017-11-22 NOTE — NURSING NOTE
"No chief complaint on file.      Initial /64  Pulse 84  Temp 97.9  F (36.6  C) (Temporal)  Resp 12  Wt 156 lb (70.8 kg)  LMP 10/29/2017  SpO2 99%  BMI 28.53 kg/m2 Estimated body mass index is 28.53 kg/(m^2) as calculated from the following:    Height as of 10/25/17: 5' 2\" (1.575 m).    Weight as of this encounter: 156 lb (70.8 kg).  Medication Reconciliation: complete     Deborah Bradley CMA (AAMA)    "

## 2017-12-01 NOTE — PROGRESS NOTES
"  SUBJECTIVE:   Katy Islas is a 27 year old female who presents to clinic today for the following health issues:    - If there is any injury, such as a fall, etc. Please send for x-rays first -CDL       Depression and Anxiety Follow-Up    Status since last visit: Worsened - mom moved away    Other associated symptoms:None    Complicating factors:     Significant life event: Yes-  Mom moved to [place]      Current substance abuse: None    -  pt states she needs refills of depression/anxiety meds - dog chewed through bottle  - Counseling every Wednesday   -       PHQ-9 Score and MyChart F/U Questions 9/11/2017 10/24/2017 12/5/2017   Total Score 7 3 4   Q9: Suicide Ideation Not at all Not at all Not at all     JULIETH-7 SCORE 9/11/2017 10/24/2017 12/5/2017   Total Score - - -   Total Score 16 (severe anxiety) 17 (severe anxiety) 4 (minimal anxiety)   Total Score 16 17 4     In the past two weeks have you had thoughts of suicide or self-harm?  No.    Do you have concerns about your personal safety or the safety of others?   No           Joint Pain - Bilateral knee pain     Onset: 3 weeks ago    Description:   Location: both knees, in all the joints in whole entire body - states has fibromyalgia, unsure if its a flare  Character: Dull ache    Intensity: moderate - worse in AM    Progression of Symptoms: \"up and down\"    Accompanying Signs & Symptoms:  Other symptoms: none    History:   Previous similar pain: YES- previous knee problems, right knee does not have a lot of cartilage per pt      Precipitating factors:   Trauma or overuse: no     Alleviating factors:  Improved by: rest/inactivity    Therapies Tried and outcome: none    - Wound vac in place, no physical therapy for her back until healed   - Following with DR. Millan, saw yesterday, sees him every time needs refills of pain medications, has appointments set up   - Wants her to see infectious disease regarding continued infection even though on antibiotics "     - Grinding in both knees, [facility] the past [facility]   - Was supposed to have surgery on right side but didn't   - Popping   - Feels like needs to twist to pop into place  - Grinding when using stairs  - No feeling like will give out or go out of place         Problem list and histories reviewed & adjusted, as indicated.  Additional history: as documented    ROS:  Constitutional, HEENT, cardiovascular, pulmonary, gi and gu systems are negative, except as otherwise noted.      OBJECTIVE:   /84 (BP Location: Left arm, Patient Position: Chair, Cuff Size: Adult Regular)  Pulse 110  Temp 98.5  F (36.9  C) (Oral)  Resp 16  Wt 150 lb (68 kg)  SpO2 98%  BMI 27.44 kg/m2  Body mass index is 27.44 kg/(m^2).  GENERAL APPEARANCE: healthy, alert and no distress  EYES: Eyes grossly normal to inspection, PERRLA, conjunctivae and sclerae without injection or discharge, EOM intact   MS: No musculoskeletal defects are noted and gait is age appropriate without ataxia       Left knee: Normal ROM, non-tender, no edema, CMS intact, normal sensory exam, normal strength, negative anterior and posterior drawer tests, negative Arnold's        Right knee: Decreased ROM with extension, remainder normal, non-tender to palpation, no edema, CMS intact, positive arnold's with localization to lateral compartment with associated pain and clicking, negative anterior and posterior drawer tests   SKIN: No suspicious lesions or rashes, hydration status appears adeuqate with normal skin turgor   NEURO: Strength 5+ bilateral lower extremities, sensation intact in distal bilateral  lower extremities, mentation- intact, speech- normal, reflexes- symmetric in bilateral lower extremities  PSYCH: Alert and oriented x3; speech- coherent , normal rate and volume; able to articulate logical thoughts, able to abstract reason, no tangential thoughts, no hallucinations or delusions, mentation appears normal, Mood is euthymic. Affect is  appropriate for this mood state and bright. Thought content is free of suicidal ideation, hallucinations, and delusions. Dress is adequate and upkept. Eye contact is good during conversation.         Diagnostic Test Results:  X-ray bilateral knees: Preliminary reading - normal bone structure with no evidence of fracture. Final pending review by radiology.       ASSESSMENT/PLAN:       ICD-10-CM    1. Acute pain of both knees M25.561 XR Knee Bilateral G/E 4 Views    M25.562 MR Knee Right w/o Contrast   2. Chronic pain syndrome G89.4    3. Chronic bilateral low back pain with left-sided sciatica M54.42     G89.29    4. Mild major depression (H) F32.0 MENTAL HEALTH REFERRAL  - Adult; Psychiatry and Medication Management   5. Anxiety F41.9 MENTAL HEALTH REFERRAL  - Adult; Psychiatry and Medication Management   6. Adjustment disorder with mixed anxiety and depressed mood F43.23 DULoxetine (CYMBALTA) 60 MG EC capsule     MENTAL HEALTH REFERRAL  - Adult; Psychiatry and Medication Management   7. Bipolar disorder, current episode mixed, severe, without psychotic features (H) F31.63 MENTAL HEALTH REFERRAL  - Adult; Psychiatry and Medication Management     1. - 3. Pain  - Discussed I have no records of Fibromyalgia as a diagnosis   - Continue to follow with surgeon and infectious disease   - Concern for meniscal injury on right knee, recommend MRI (last one was >4 years ago)      Team was able to schedule this at [facility] (1 mile from patient's home) along with ASL interpretor for the appointment, patient to disconnect wound vac for MRI   - Will likely need ortho referral pending results   - Left knee pain either due to possible fibromyalgia or radiation from low back issues   - Will have patient increase Gabapentin to 1 in the morning, 1 at lunch, and 3 at night, reviewed use and side effects   - Also recommend daily stretching/strengthening that doesn't hurt her back  - Hand out given and discussed       3-5.  Mood   - Worsening due to mom moving away but still low PHQ9 and JULIETH scores   - Discussed possibly increasing Abilify but patient wishes to leave things alone for now, patient to continue at 5 mg, reviewed use and side effects   - Refilled Cymbalta and reviewed use and side effects  - Continue with weekly counseling with ASL counselor in [place]   - Will refer to [facility] in [place] for psychiatry evaluation and assistance with medications       The patient indicates understanding of these issues and agrees with the plan.    Follow up: as outlined above     Due to language barrier, an  was present during the history-taking and subsequent discussion (and for part of the physical exam) with this patient.        Aura Rosario PA-C  [facility]

## 2017-12-05 ENCOUNTER — OFFICE VISIT (OUTPATIENT)
Dept: FAMILY MEDICINE | Facility: OTHER | Age: 27
End: 2017-12-05
Payer: MEDICARE

## 2017-12-05 ENCOUNTER — TELEPHONE (OUTPATIENT)
Dept: FAMILY MEDICINE | Facility: OTHER | Age: 27
End: 2017-12-05

## 2017-12-05 ENCOUNTER — RADIANT APPOINTMENT (OUTPATIENT)
Dept: GENERAL RADIOLOGY | Facility: OTHER | Age: 27
End: 2017-12-05
Attending: PHYSICIAN ASSISTANT
Payer: MEDICARE

## 2017-12-05 VITALS
DIASTOLIC BLOOD PRESSURE: 84 MMHG | SYSTOLIC BLOOD PRESSURE: 116 MMHG | RESPIRATION RATE: 16 BRPM | BODY MASS INDEX: 27.44 KG/M2 | OXYGEN SATURATION: 98 % | HEART RATE: 110 BPM | TEMPERATURE: 98.5 F | WEIGHT: 150 LBS

## 2017-12-05 DIAGNOSIS — F41.9 ANXIETY: ICD-10-CM

## 2017-12-05 DIAGNOSIS — M25.561 ACUTE PAIN OF BOTH KNEES: Primary | ICD-10-CM

## 2017-12-05 DIAGNOSIS — M25.562 ACUTE PAIN OF BOTH KNEES: ICD-10-CM

## 2017-12-05 DIAGNOSIS — M25.561 ACUTE PAIN OF BOTH KNEES: ICD-10-CM

## 2017-12-05 DIAGNOSIS — G89.4 CHRONIC PAIN SYNDROME: ICD-10-CM

## 2017-12-05 DIAGNOSIS — G89.29 CHRONIC BILATERAL LOW BACK PAIN WITH LEFT-SIDED SCIATICA: ICD-10-CM

## 2017-12-05 DIAGNOSIS — F32.0 MILD MAJOR DEPRESSION (H): ICD-10-CM

## 2017-12-05 DIAGNOSIS — F43.23 ADJUSTMENT DISORDER WITH MIXED ANXIETY AND DEPRESSED MOOD: ICD-10-CM

## 2017-12-05 DIAGNOSIS — M54.42 CHRONIC BILATERAL LOW BACK PAIN WITH LEFT-SIDED SCIATICA: ICD-10-CM

## 2017-12-05 DIAGNOSIS — F31.63 BIPOLAR DISORDER, CURRENT EPISODE MIXED, SEVERE, WITHOUT PSYCHOTIC FEATURES (H): ICD-10-CM

## 2017-12-05 DIAGNOSIS — M25.562 ACUTE PAIN OF BOTH KNEES: Primary | ICD-10-CM

## 2017-12-05 PROCEDURE — 99214 OFFICE O/P EST MOD 30 MIN: CPT | Performed by: PHYSICIAN ASSISTANT

## 2017-12-05 PROCEDURE — 73564 X-RAY EXAM KNEE 4 OR MORE: CPT | Mod: LT

## 2017-12-05 RX ORDER — GABAPENTIN 300 MG/1
300 CAPSULE ORAL 3 TIMES DAILY
Qty: 90 CAPSULE | Refills: 3 | Status: CANCELLED | OUTPATIENT
Start: 2017-12-05

## 2017-12-05 RX ORDER — MORPHINE SULFATE 15 MG/1
TABLET, FILM COATED, EXTENDED RELEASE ORAL
Refills: 0 | COMMUNITY
Start: 2017-11-13 | End: 2018-01-15

## 2017-12-05 RX ORDER — DULOXETIN HYDROCHLORIDE 60 MG/1
60 CAPSULE, DELAYED RELEASE ORAL DAILY
Qty: 30 CAPSULE | Refills: 3 | Status: SHIPPED | OUTPATIENT
Start: 2017-12-05 | End: 2018-01-15

## 2017-12-05 RX ORDER — ARIPIPRAZOLE 5 MG/1
5 TABLET ORAL AT BEDTIME
Qty: 30 TABLET | Refills: 0 | Status: CANCELLED | OUTPATIENT
Start: 2017-12-05

## 2017-12-05 RX ORDER — LEVOFLOXACIN 500 MG/1
TABLET, FILM COATED ORAL
Refills: 1 | COMMUNITY
Start: 2017-11-27 | End: 2017-12-19

## 2017-12-05 ASSESSMENT — ANXIETY QUESTIONNAIRES
2. NOT BEING ABLE TO STOP OR CONTROL WORRYING: NOT AT ALL
GAD7 TOTAL SCORE: 4
1. FEELING NERVOUS, ANXIOUS, OR ON EDGE: MORE THAN HALF THE DAYS
6. BECOMING EASILY ANNOYED OR IRRITABLE: MORE THAN HALF THE DAYS
7. FEELING AFRAID AS IF SOMETHING AWFUL MIGHT HAPPEN: NOT AT ALL
7. FEELING AFRAID AS IF SOMETHING AWFUL MIGHT HAPPEN: NOT AT ALL
5. BEING SO RESTLESS THAT IT IS HARD TO SIT STILL: NOT AT ALL
4. TROUBLE RELAXING: NOT AT ALL
3. WORRYING TOO MUCH ABOUT DIFFERENT THINGS: NOT AT ALL

## 2017-12-05 ASSESSMENT — PAIN SCALES - GENERAL: PAINLEVEL: MILD PAIN (3)

## 2017-12-05 NOTE — PATIENT INSTRUCTIONS
Plan     - Increase Gabapentin to 1 in the morning, 1 at lunch, and 3 at night     - Scheduled MRI at [facility] in [place] 10/7/17 please arrive at 945am.    - Daily stretching/strengthening     - Call for appointment with psychiatry at [facility]                 Meniscal (Cartilage) Tear Rehabilitation Exercises                You may do the first 5 exercises right away. You may do the rest of the exercises when the pain in your knee has decreased.   Passive knee extension: Do this exercise if you are unable to fully extend your knee. While lying on your back, place a rolled-up towel underneath the heel of your injured leg so the heel is about 6 inches off the ground. Relax your leg muscles and let gravity slowly straighten your knee. You may feel some discomfort while doing this exercise. Try to hold this position for 2 minutes. Repeat 3 times. Do this exercise several times per day. This exercise can also be done while sitting in a chair with your heel on another chair or stool.   Heel slide: Sit on a firm surface with your legs straight in front of you. Slowly slide the heel of your injured leg toward your buttock by pulling your knee toward your chest as you slide the heel. Return to the starting position. Do 3 sets of 10.   Standing calf stretch: Stand facing a wall with your hands on the wall at about eye level. Keep your injured leg back with your heel on the floor. Keep the other leg forward with the knee bent. Turn your back foot slightly inward (as if you were pigeon-toed). Slowly lean into the wall until you feel a stretch in the back of your calf. Hold the stretch for 15 to 30 seconds. Return to the starting position. Repeat 3 times. Do this exercise several times each day.   Hamstring stretch on wall: Lie on your back with your buttocks close to a doorway. Stretch your uninjured leg straight out in front of you on the floor through the doorway. Raise your injured leg and rest it  against the wall next to the door frame. Keep your leg as straight as possible. You should feel a stretch in the back of your thigh. Hold this position for 15 to 30 seconds. Repeat 3 times.   Straight leg raise: Lie on your back with your legs straight out in front of you. Bend the knee on your uninjured side and place the foot flat on the floor. Tighten the thigh muscle on your injured side and lift your leg about 8 inches off the floor. Keep your leg straight and your thigh muscle tight. Slowly lower your leg back down to the floor. Do 3 sets of 10.   Wall squat with a ball: Stand with your back, shoulders, and head against a wall. Look straight ahead. Keep your shoulders relaxed and your feet 3 feet from the wall and shoulder's width apart. Place a soccer or basketball-sized ball behind your back. Keeping your back against the wall, slowly squat down to a 45-degree angle. Your thighs will not yet be parallel to the floor. Hold this position for 10 seconds and then slowly slide back up the wall. Repeat 10 times. Build up to 3 sets of 10.   Step-up: Stand with the foot of your injured leg on a support 3 to 5 inches high (like a small step or block of wood). Keep your other foot flat on the floor. Shift your weight onto the injured leg on the support. Straighten your injured leg as the other leg comes off the floor. Return to the starting position by bending your injured leg and slowly lowering your uninjured leg back to the floor. Do 3 sets of 10.   Knee stabilization: Wrap a piece of elastic tubing around the ankle of the uninjured leg. Tie a knot in the other end of the tubing and close it in a door.   0. Stand facing the door on the leg without tubing and bend your knee slightly, keeping your thigh muscles tight. While maintaining this position, move the leg with the tubing straight back behind you. Do 3 sets of 10.   0. Turn 90 degrees so the leg without tubing is closest to the door. Move the leg with tubing  away from your body. Do 3 sets of 10.   0. Turn 90 degrees again so your back is to the door. Move the leg with tubing straight out in front of you. Do 3 sets of 10.   0. Turn your body 90 degrees again so the leg with tubing is closest to the door. Move the leg with tubing across your body. Do 3 sets of 10.   Hold onto a chair if you need help balancing. This exercise can be made even more challenging by standing on a pillow while you move the leg with tubing.   Resisted terminal knee extension: Make a loop from a piece of elastic tubing by tying a knot in both ends. Close both knots in a door. Step into the loop so the tubing is around the back of your injured leg. Lift the other foot off the ground. Hold onto a chair for balance, if needed. Bend the knee on the leg with tubing about 45 degrees. Slowly straighten your leg, keeping your thigh muscle tight as you do this. Do this 10 times. Do 3 sets. An easier way to do this is to stand on both legs for better support while you do the exercise.   Wobble board exercises:   0. Stand on a wobble board with your feet shoulder width apart. Rock the board forwards and backwards 30 times, then side to side 30 times. Hold on to a chair if you need support.   0. Rotate the wobble board around so that the edge of the board is in contact with the floor at all times. Do this 30 times in a clockwise and then a counterclockwise direction.   0. Balance on the wobble board for as long as you can without letting the edges touch the floor. Try to do this for 2 minutes without touching the floor.   0. Rotate the wobble board in clockwise and counterclockwise circles, but do not allow the edge of the board to touch the floor.   0. When you have mastered exercises A through D, try repeating them while standing on just your injured leg. Once you can do these exercises on one leg, try to do them with your eyes closed. Make sure you have something nearby to support you in case you lose  your balance.         Published by [employer].  This content is reviewed periodically and is subject to change as new health information becomes available. The information is intended to inform and educate and is not a replacement for medical evaluation, advice, diagnosis or treatment by a healthcare professional.   Written by Poppy Parry MS, PT, and Judy Richardson PT, [facility], [facility], for [employer].   ? 2010 [facility] and/or its affiliates. All Rights Reserved.   Copyright   [employer] 2011

## 2017-12-05 NOTE — TELEPHONE ENCOUNTER
MRI order placed for right knee MRI without contrast. Patient would like to get completed in [place].     [facility] will need [facility] interpret[facility] as well. Order signed. Please assist in scheduling.     Zach Rsoario PA-C  [facility]

## 2017-12-05 NOTE — MR AVS SNAPSHOT
After Visit Summary   12/5/2017    Katy Islas    MRN: [id number]           Patient Information     Date Of Birth          1990        Visit Information        Provider Department      12/5/2017 8:30 AM Aura Rosario PA-C; [facility]        Today's Diagnoses     Acute pain of both knees    -  1    Chronic pain syndrome        Chronic bilateral low back pain with left-sided sciatica        Mild major depression (H)        Anxiety        Adjustment disorder with mixed anxiety and depressed mood        Bipolar disorder, current episode mixed, severe, without psychotic features (H)          Care Instructions    Plan     - Increase Gabapentin to 1 in the morning, 1 at lunch, and 3 at night     - Scheduled MRI at [facility] 10/7/17 please arrive at 945am.    - Daily stretching/strengthening     - Call for appointment with psychiatry at [facility]                 Meniscal (Cartilage) Tear Rehabilitation Exercises                You may do the first 5 exercises right away. You may do the rest of the exercises when the pain in your knee has decreased.   Passive knee extension: Do this exercise if you are unable to fully extend your knee. While lying on your back, place a rolled-up towel underneath the heel of your injured leg so the heel is about 6 inches off the ground. Relax your leg muscles and let gravity slowly straighten your knee. You may feel some discomfort while doing this exercise. Try to hold this position for 2 minutes. Repeat 3 times. Do this exercise several times per day. This exercise can also be done while sitting in a chair with your heel on another chair or stool.   Heel slide: Sit on a firm surface with your legs straight in front of you. Slowly slide the heel of your injured leg toward your buttock by pulling your knee toward your chest as you slide the heel. Return to the starting position. Do 3 sets of 10.    Standing calf stretch: Stand facing a wall with your hands on the wall at about eye level. Keep your injured leg back with your heel on the floor. Keep the other leg forward with the knee bent. Turn your back foot slightly inward (as if you were pigeon-toed). Slowly lean into the wall until you feel a stretch in the back of your calf. Hold the stretch for 15 to 30 seconds. Return to the starting position. Repeat 3 times. Do this exercise several times each day.   Hamstring stretch on wall: Lie on your back with your buttocks close to a doorway. Stretch your uninjured leg straight out in front of you on the floor through the doorway. Raise your injured leg and rest it against the wall next to the door frame. Keep your leg as straight as possible. You should feel a stretch in the back of your thigh. Hold this position for 15 to 30 seconds. Repeat 3 times.   Straight leg raise: Lie on your back with your legs straight out in front of you. Bend the knee on your uninjured side and place the foot flat on the floor. Tighten the thigh muscle on your injured side and lift your leg about 8 inches off the floor. Keep your leg straight and your thigh muscle tight. Slowly lower your leg back down to the floor. Do 3 sets of 10.   Wall squat with a ball: Stand with your back, shoulders, and head against a wall. Look straight ahead. Keep your shoulders relaxed and your feet 3 feet from the wall and shoulder's width apart. Place a soccer or basketball-sized ball behind your back. Keeping your back against the wall, slowly squat down to a 45-degree angle. Your thighs will not yet be parallel to the floor. Hold this position for 10 seconds and then slowly slide back up the wall. Repeat 10 times. Build up to 3 sets of 10.   Step-up: Stand with the foot of your injured leg on a support 3 to 5 inches high (like a small step or block of wood). Keep your other foot flat on the floor. Shift your weight onto the injured leg on the support.  Straighten your injured leg as the other leg comes off the floor. Return to the starting position by bending your injured leg and slowly lowering your uninjured leg back to the floor. Do 3 sets of 10.   Knee stabilization: Wrap a piece of elastic tubing around the ankle of the uninjured leg. Tie a knot in the other end of the tubing and close it in a door.   0. Stand facing the door on the leg without tubing and bend your knee slightly, keeping your thigh muscles tight. While maintaining this position, move the leg with the tubing straight back behind you. Do 3 sets of 10.   0. Turn 90 degrees so the leg without tubing is closest to the door. Move the leg with tubing away from your body. Do 3 sets of 10.   0. Turn 90 degrees again so your back is to the door. Move the leg with tubing straight out in front of you. Do 3 sets of 10.   0. Turn your body 90 degrees again so the leg with tubing is closest to the door. Move the leg with tubing across your body. Do 3 sets of 10.   Hold onto a chair if you need help balancing. This exercise can be made even more challenging by standing on a pillow while you move the leg with tubing.   Resisted terminal knee extension: Make a loop from a piece of elastic tubing by tying a knot in both ends. Close both knots in a door. Step into the loop so the tubing is around the back of your injured leg. Lift the other foot off the ground. Hold onto a chair for balance, if needed. Bend the knee on the leg with tubing about 45 degrees. Slowly straighten your leg, keeping your thigh muscle tight as you do this. Do this 10 times. Do 3 sets. An easier way to do this is to stand on both legs for better support while you do the exercise.   Wobble board exercises:   0. Stand on a wobble board with your feet shoulder width apart. Rock the board forwards and backwards 30 times, then side to side 30 times. Hold on to a chair if you need support.   0. Rotate the wobble board around so that the edge of  the board is in contact with the floor at all times. Do this 30 times in a clockwise and then a counterclockwise direction.   0. Balance on the wobble board for as long as you can without letting the edges touch the floor. Try to do this for 2 minutes without touching the floor.   0. Rotate the wobble board in clockwise and counterclockwise circles, but do not allow the edge of the board to touch the floor.   0. When you have mastered exercises A through D, try repeating them while standing on just your injured leg. Once you can do these exercises on one leg, try to do them with your eyes closed. Make sure you have something nearby to support you in case you lose your balance.         Published by [employer].  This content is reviewed periodically and is subject to change as new health information becomes available. The information is intended to inform and educate and is not a replacement for medical evaluation, advice, diagnosis or treatment by a healthcare professional.   Written by Poppy Parry, MS, PT, and Judy Richardson PT, [facility], \[facility]\"", for [facility].   ? 2010 [facility] and/or its affiliates. All Rights Reserved.   Copyright   [employer] 2011                Follow-ups after your visit        Additional Services     MENTAL HEALTH REFERRAL  - Adult; Psychiatry and Medication Management       [facility]  [phone]        All scheduling is subject to the client's specific insurance plan & benefits, provider/location availability, and provider clinical specialities.  Please arrive 15 minutes early for your first appointment and bring your completed paperwork.    Please be aware that coverage of these services is subject to the terms and limitations of your health insurance plan.  Call member services at your health plan with any benefit or coverage questions.                  Future tests that were ordered for you today     Open Future Orders        Priority  Expected Expires Ordered    MR Knee Right w/o Contrast Routine  12/5/2018 12/5/2017            Who to contact     If you have questions or need follow up information about today's clinic visit or your schedule please contact [facility] [place] directly at [phone].  Normal or non-critical lab and imaging results will be communicated to you by MyChart, letter or phone within 4 business days after the clinic has received the results. If you do not hear from us within 7 days, please contact the clinic through [employer] or phone. If you have a critical or abnormal lab result, we will notify you by phone as soon as possible.  Submit refill requests through [employer] or call your pharmacy and they will forward the refill request to us. Please allow 3 business days for your refill to be completed.          Additional Information About Your Visit        MyChart Information     [employer] gives you secure access to your electronic health record. If you see a primary care provider, you can also send messages to your care team and make appointments. If you have questions, please call your primary care clinic.  If you do not have a primary care provider, please call [phone] and they will assist you.        Care EveryWhere ID     This is your Care EveryWhere ID. This could be used by other organizations to access your [facility] medical records  [id number]        Your Vitals Were     Pulse Temperature Respirations Pulse Oximetry BMI (Body Mass Index)       110 98.5  F (36.9  C) (Oral) 16 98% 27.44 kg/m2        Blood Pressure from Last 3 Encounters:   12/05/17 116/84   11/22/17 130/64   11/16/17 143/86    Weight from Last 3 Encounters:   12/05/17 150 lb (68 kg)   11/22/17 156 lb (70.8 kg)   11/13/17 145 lb (65.8 kg)              We Performed the Following     MENTAL HEALTH REFERRAL  - Adult; Psychiatry and Medication Management          Where to get your medicines      These medications were sent to [facility]  [id number] - [place], [place] - [address]  [address], [facility] [address]     Phone:  [phone]     DULoxetine 60 MG EC capsule          Primary Care Provider Office Phone # Fax #    Aura DIAZ OLVE Rosario [phone]       [address]  [facility] [place] [address]        Equal Access to Services     VIKY VILLANUEVA : Hadii aad ku hadasho Soomaali, waaxda luqadaha, qaybta kaalmada adeegyada, waxay idiin hayaan adefloyd tomlinaralakshmi santo . So [facility] [phone].    ATENCIÓN: Si habla español, tiene a kerr disposición servicios gratuitos de asistencia lingüística. [facility] [phone].    We comply with applicable federal civil rights laws and [place] laws. We do not discriminate on the basis of race, color, national origin, age, disability, sex, sexual orientation, or gender identity.            Thank you!     Thank you for choosing [facility]  for your care. Our goal is always to provide you with excellent care. Hearing back from our patients is one way we can continue to improve our services. Please take a few minutes to complete the written survey that you may receive in the mail after your visit with us. Thank you!             Your Updated Medication List - Protect others around you: Learn how to safely use, store and throw away your medicines at www.disposemymeds.org.          This list is accurate as of: 12/5/17  9:50 AM.  Always use your most recent med list.                   Brand Name Dispense Instructions for use Diagnosis    * albuterol 108 (90 BASE) MCG/ACT Inhaler    PROAIR HFA/PROVENTIL HFA/VENTOLIN HFA    1 Inhaler    Inhale 2 puffs into the lungs every 6 hours as needed for shortness of breath / dyspnea or wheezing    Mild persistent asthma without complication       * albuterol (2.5 MG/3ML) 0.083% neb solution     50 vial    Take 1 vial (2.5 mg) by nebulization every 6 hours as needed for shortness of breath  / dyspnea or wheezing    Mild persistent asthma without complication       ARIPiprazole 5 MG tablet    ABILIFY    30 tablet    Take 1 tablet (5 mg) by mouth At Bedtime    Adjustment disorder with mixed anxiety and depressed mood, Anxiety       cephALEXin 500 MG capsule    KEFLEX          * cetirizine 10 MG tablet    zyrTEC     Take 10 mg by mouth daily        * cetirizine 10 MG tablet    zyrTEC    90 tablet    Take 1 tablet (10 mg) by mouth every evening    Chronic seasonal allergic rhinitis due to pollen       cyclobenzaprine 10 MG tablet    FLEXERIL    60 tablet    Take 2 tablets (20 mg) by mouth nightly as needed for muscle spasms or other (back pain)    Chronic bilateral low back pain with left-sided sciatica       dexlansoprazole 60 MG Cpdr CR capsule    DEXILANT    30 capsule    Take 1 capsule (60 mg) by mouth daily    Gastroesophageal reflux disease without esophagitis       diazepam 5 MG tablet    VALIUM    60 tablet    Take 0.5-1 tablets (2.5-5 mg) by mouth every 12 hours as needed for anxiety or sleep (MUSCLE SPASM) (one month supply)    Chronic pain syndrome, Chronic bilateral low back pain with left-sided sciatica, Lumbar disc disease with radiculopathy, S/P lumbar fusion       dicyclomine 20 MG tablet    BENTYL    30 tablet    Take 1 tablet (20 mg) by mouth 4 times daily as needed (ABDOMINAL CRAMPING) AS NEEDED FOR CRAMPING    Hx of Crohn's disease       diphenoxylate-atropine 2.5-0.025 MG per tablet    LOMOTIL    10 tablet    Take 1 tablet by mouth 4 times daily as needed for diarrhea        DULoxetine 60 MG EC capsule    CYMBALTA    30 capsule    Take 1 capsule (60 mg) by mouth daily    Adjustment disorder with mixed anxiety and depressed mood       EPINEPHrine 0.3 MG/0.3ML injection 2-pack    EPIPEN/ADRENACLICK/or ANY BX GENERIC EQUIV    0.6 mL    Inject 0.3 mLs (0.3 mg) into the muscle once as needed for anaphylaxis    Bee sting allergy       FLEX-A-MIN JOINT FLEX PO      Take by mouth daily  Reported on 3/28/2017        gabapentin 300 MG capsule    NEURONTIN    90 capsule    Take 1 capsule (300 mg) by mouth 3 times daily    Chronic bilateral low back pain with left-sided sciatica       levofloxacin 500 MG tablet    LEVAQUIN     TK 1 T PO  Q 24 H        meclizine 12.5 MG tablet    ANTIVERT    30 tablet    Take 1 tablet (12.5 mg) by mouth 4 times daily as needed for dizziness    Dizziness       medical cannabis inhalation (Patient's own supply.  Not a prescription)      Inhale into the lungs 3 times daily as needed Reported on 3/28/2017        medroxyPROGESTERone 150 MG/ML injection    DEPO-PROVERA    3 mL    Inject 1 mL (150 mg) into the muscle every 3 months    Encounter for surveillance of injectable contraceptive, Dysmenorrhea       morphine 15 MG 12 hr tablet    MS CONTIN     TK 1 T PO  Q 12 H. MAX 2 TS D        Multi-vitamin Tabs tablet   Generic drug:  multivitamin, therapeutic with minerals      Take 1 tablet by mouth daily Reported on 3/28/2017        naloxone nasal spray    NARCAN    0.2 mL    Spray 1 spray (4 mg) into one nostril alternating nostrils as needed for opioid reversal every 2-3 minutes until assistance arrives    Chronic pain syndrome, Chronic bilateral low back pain with left-sided sciatica, S/P lumbar fusion, Lumbar disc disease with radiculopathy       NONFORMULARY      Apply 30 mLs topically 4 times daily        ondansetron 4 MG ODT tab    ZOFRAN ODT    60 tablet    Take 1-2 tablets (4-8 mg) by mouth every 6 hours as needed for nausea Take 1-2 tablets by mouth as needed for nausea    Nausea       order for DME     1 each    Nebulizer with mask and tubing    Mild persistent asthma without complication       oxyCODONE IR 5 MG tablet    ROXICODONE    18 tablet    Take 1 tablet (5 mg) by mouth every 4 hours as needed for pain maximum 4 tablet(s) per day    Chronic pain syndrome, Chronic bilateral low back pain with left-sided sciatica, S/P lumbar fusion, Lumbar disc disease with  radiculopathy       polyethylene glycol Packet    MIRALAX/GLYCOLAX     Take 1 packet by mouth daily        promethazine 6.25 MG/5ML syrup    PHENERGAN    560 mL    Take 10 mLs (12.5 mg) by mouth 4 times daily as needed for nausea    Nausea       rizatriptan 5 MG ODT tab    MAXALT-MLT    18 tablet    Take 1-2 tablets (5-10 mg) by mouth at onset of headache for migraine May repeat in 2 hours. Max 6 tablets/24 hours.    Migraine with aura and without status migrainosus, not intractable       senna-docusate 8.6-50 MG per tablet    SENOKOT-S;PERICOLACE    60 tablet    Take 1 tablet by mouth 2 times daily as needed for constipation    Drug-induced constipation       * Notice:  This list has 4 medication(s) that are the same as other medications prescribed for you. Read the directions carefully, and ask your doctor or other care provider to review them with you.

## 2017-12-05 NOTE — NURSING NOTE
"Chief Complaint   Patient presents with     Knee Pain     Panel Management     Flu, JULIETH, PHQ       Initial /84 (BP Location: Left arm, Patient Position: Chair, Cuff Size: Adult Regular)  Pulse 110  Temp 98.5  F (36.9  C) (Oral)  Resp 16  Wt 150 lb (68 kg)  SpO2 98%  BMI 27.44 kg/m2 Estimated body mass index is 27.44 kg/(m^2) as calculated from the following:    Height as of 10/25/17: 5' 2\" (1.575 m).    Weight as of this encounter: 150 lb (68 kg).  Medication Reconciliation: complete  "

## 2017-12-06 ASSESSMENT — PATIENT HEALTH QUESTIONNAIRE - PHQ9: SUM OF ALL RESPONSES TO PHQ QUESTIONS 1-9: 4

## 2017-12-06 ASSESSMENT — ANXIETY QUESTIONNAIRES: GAD7 TOTAL SCORE: 4

## 2017-12-07 ENCOUNTER — TRANSFERRED RECORDS (OUTPATIENT)
Dept: HEALTH INFORMATION MANAGEMENT | Facility: CLINIC | Age: 27
End: 2017-12-07

## 2017-12-12 ENCOUNTER — MYC MEDICAL ADVICE (OUTPATIENT)
Dept: FAMILY MEDICINE | Facility: OTHER | Age: 27
End: 2017-12-12

## 2017-12-12 DIAGNOSIS — M25.561 ACUTE PAIN OF RIGHT KNEE: Primary | ICD-10-CM

## 2017-12-13 NOTE — TELEPHONE ENCOUNTER
Please have patient follow up with orthopedics to discuss.   Zach Rosario PA-C  [facility]

## 2017-12-19 ENCOUNTER — TELEPHONE (OUTPATIENT)
Dept: FAMILY MEDICINE | Facility: OTHER | Age: 27
End: 2017-12-19

## 2017-12-19 ENCOUNTER — OFFICE VISIT (OUTPATIENT)
Dept: FAMILY MEDICINE | Facility: OTHER | Age: 27
End: 2017-12-19
Payer: MEDICARE

## 2017-12-19 VITALS
TEMPERATURE: 98.6 F | DIASTOLIC BLOOD PRESSURE: 70 MMHG | BODY MASS INDEX: 28.46 KG/M2 | HEART RATE: 80 BPM | RESPIRATION RATE: 18 BRPM | SYSTOLIC BLOOD PRESSURE: 105 MMHG | WEIGHT: 155.6 LBS

## 2017-12-19 DIAGNOSIS — G89.29 CHRONIC BILATERAL LOW BACK PAIN WITH LEFT-SIDED SCIATICA: ICD-10-CM

## 2017-12-19 DIAGNOSIS — G89.4 CHRONIC PAIN SYNDROME: Primary | ICD-10-CM

## 2017-12-19 DIAGNOSIS — M54.42 CHRONIC BILATERAL LOW BACK PAIN WITH LEFT-SIDED SCIATICA: ICD-10-CM

## 2017-12-19 DIAGNOSIS — R11.0 NAUSEA: ICD-10-CM

## 2017-12-19 DIAGNOSIS — M25.561 ACUTE PAIN OF RIGHT KNEE: ICD-10-CM

## 2017-12-19 PROCEDURE — 99214 OFFICE O/P EST MOD 30 MIN: CPT | Performed by: PHYSICIAN ASSISTANT

## 2017-12-19 ASSESSMENT — PAIN SCALES - GENERAL: PAINLEVEL: NO PAIN (0)

## 2017-12-19 NOTE — MR AVS SNAPSHOT
After Visit Summary   12/19/2017    Katy Islas    MRN: [id number]           Patient Information     Date Of Birth          1990        Visit Information        Provider Department      12/19/2017 12:45 PM Aura Rosario PA-C [facility]        Today's Diagnoses     Acute pain of right knee    -  1    Nausea          Care Instructions    - Call to get appointment with orthopedics for knees     - Decrease MS Contin 15 mg to 1 tablet per day at night        Can use Oxycodone 20 mg - 1-3 tablets during the day     - After 1-2 weeks     Can try stopping MS contin 15 mg      Continue on 3-4 oxycodone per day     - Recheck in clinic in ~1 month                 Follow-ups after your visit        Additional Services     ORTHO  REFERRAL       [facility] is referring you to the Orthopedic  Services at [facility].       The  Representative will assist you in the coordination of your Orthopedic and Musculoskeletal Care as prescribed by your physician.    The  Representative will call you within 1 business day to help schedule your appointment, or you may contact the  Representative at:    All areas ~ [phone]     Type of Referral : Surgical / Specialist       Timeframe requested: Routine    Coverage of these services is subject to the terms and limitations of your health insurance plan.  Please call member services at your health plan with any benefit or coverage questions.      If X-rays, CT or MRI's have been performed, please contact the facility where they were done to arrange for , prior to your scheduled appointment.  Please bring this referral request to your appointment and present it to your specialist.                  Who to contact     If you have questions or need follow up information about today's clinic visit or your schedule please contact [facility] directly at [phone].  Normal or non-critical lab and imaging results will be communicated to you by MyChart, letter or phone within 4 business days after the clinic has received the results. If you do not hear from us within 7 days, please contact the clinic through InToTallyhart or phone. If you have a critical or abnormal lab result, we will notify you by phone as soon as possible.  Submit refill requests through [employer] or call your pharmacy and they will forward the refill request to us. Please allow 3 business days for your refill to be completed.          Additional Information About Your Visit        InToTallyhart Information     [employer] gives you secure access to your electronic health record. If you see a primary care provider, you can also send messages to your care team and make appointments. If you have questions, please call your primary care clinic.  If you do not have a primary care provider, please call [phone] and they will assist you.        Care EveryWhere ID     This is your Care EveryWhere ID. This could be used by other organizations to access your [place] medical records  [id number]        Your Vitals Were     Pulse Temperature Respirations Breastfeeding? BMI (Body Mass Index)       80 98.6  F (37  C) (Oral) 18 No 28.46 kg/m2        Blood Pressure from Last 3 Encounters:   12/19/17 105/70   12/05/17 116/84   11/22/17 130/64    Weight from Last 3 Encounters:   12/19/17 155 lb 9.6 oz (70.6 kg)   12/05/17 150 lb (68 kg)   11/22/17 156 lb (70.8 kg)              We Performed the Following     ORTHO  REFERRAL          Where to get your medicines      These medications were sent to [facility] [address] [facility] [address]  [address] [facility] [address]     Phone:  [phone]     promethazine 6.25 MG/5ML syrup          Primary Care Provider Office Phone # Fax #    Aura Rosario PA-C  [phone]       [address] [facility] [address]  [facility] [address]        Equal Access to Services     VIKY VILLANUEVA : Hadii aad ku hadpetemarion Lorenaali, wasabinada emmanueltania, esterta kajenny ragland, susan calebin hayaan stephaniefloyd trinidad gal turner. So [facility] [phone].    ATENCIÓN: Si habla español, tiene a kerr disposición servicios gratuitos de asistencia lingüística. Llame al [phone].    We comply with applicable federal civil rights laws and [place] laws. We do not discriminate on the basis of race, color, national origin, age, disability, sex, sexual orientation, or gender identity.            Thank you!     Thank you for choosing [facility]  for your care. Our goal is always to provide you with excellent care. Hearing back from our patients is one way we can continue to improve our services. Please take a few minutes to complete the written survey that you may receive in the mail after your visit with us. Thank you!             Your Updated Medication List - Protect others around you: Learn how to safely use, store and throw away your medicines at www.disposemymeds.org.          This list is accurate as of: 12/19/17  1:14 PM.  Always use your most recent med list.                   Brand Name Dispense Instructions for use Diagnosis    * albuterol 108 (90 BASE) MCG/ACT Inhaler    PROAIR HFA/PROVENTIL HFA/VENTOLIN HFA    1 Inhaler    Inhale 2 puffs into the lungs every 6 hours as needed for shortness of breath / dyspnea or wheezing    Mild persistent asthma without complication       * albuterol (2.5 MG/3ML) 0.083% neb solution     50 vial    Take 1 vial (2.5 mg) by nebulization every 6 hours as needed for shortness of breath / dyspnea or wheezing    Mild persistent asthma without complication       ARIPiprazole 5 MG tablet    ABILIFY    30 tablet    Take 1 tablet (5 mg) by mouth At Bedtime    Adjustment disorder with mixed anxiety and depressed mood, Anxiety       cephALEXin 500 MG capsule     KEFLEX          * cetirizine 10 MG tablet    zyrTEC     Take 10 mg by mouth daily        * cetirizine 10 MG tablet    zyrTEC    90 tablet    Take 1 tablet (10 mg) by mouth every evening    Chronic seasonal allergic rhinitis due to pollen       cyclobenzaprine 10 MG tablet    FLEXERIL    60 tablet    Take 2 tablets (20 mg) by mouth nightly as needed for muscle spasms or other (back pain)    Chronic bilateral low back pain with left-sided sciatica       dexlansoprazole 60 MG Cpdr CR capsule    DEXILANT    30 capsule    Take 1 capsule (60 mg) by mouth daily    Gastroesophageal reflux disease without esophagitis       diazepam 5 MG tablet    VALIUM    60 tablet    Take 0.5-1 tablets (2.5-5 mg) by mouth every 12 hours as needed for anxiety or sleep (MUSCLE SPASM) (one month supply)    Chronic pain syndrome, Chronic bilateral low back pain with left-sided sciatica, Lumbar disc disease with radiculopathy, S/P lumbar fusion       dicyclomine 20 MG tablet    BENTYL    30 tablet    Take 1 tablet (20 mg) by mouth 4 times daily as needed (ABDOMINAL CRAMPING) AS NEEDED FOR CRAMPING    Hx of Crohn's disease       diphenoxylate-atropine 2.5-0.025 MG per tablet    LOMOTIL    10 tablet    Take 1 tablet by mouth 4 times daily as needed for diarrhea        DULoxetine 60 MG EC capsule    CYMBALTA    30 capsule    Take 1 capsule (60 mg) by mouth daily    Adjustment disorder with mixed anxiety and depressed mood       EPINEPHrine 0.3 MG/0.3ML injection 2-pack    EPIPEN/ADRENACLICK/or ANY BX GENERIC EQUIV    0.6 mL    Inject 0.3 mLs (0.3 mg) into the muscle once as needed for anaphylaxis    Bee sting allergy       FLEX-A-MIN JOINT FLEX PO      Take by mouth daily Reported on 3/28/2017        gabapentin 300 MG capsule    NEURONTIN    90 capsule    Take 1 capsule (300 mg) by mouth 3 times daily    Chronic bilateral low back pain with left-sided sciatica       meclizine 12.5 MG tablet    ANTIVERT    30 tablet    Take 1 tablet (12.5 mg) by  mouth 4 times daily as needed for dizziness    Dizziness       medical cannabis inhalation (Patient's own supply.  Not a prescription)      Inhale into the lungs 3 times daily as needed Reported on 3/28/2017        medroxyPROGESTERone 150 MG/ML injection    DEPO-PROVERA    3 mL    Inject 1 mL (150 mg) into the muscle every 3 months    Encounter for surveillance of injectable contraceptive, Dysmenorrhea       morphine 15 MG 12 hr tablet    MS CONTIN     TK 1 T PO  Q 12 H. MAX 2 TS D        Multi-vitamin Tabs tablet   Generic drug:  multivitamin, therapeutic with minerals      Take 1 tablet by mouth daily Reported on 3/28/2017        naloxone nasal spray    NARCAN    0.2 mL    Spray 1 spray (4 mg) into one nostril alternating nostrils as needed for opioid reversal every 2-3 minutes until assistance arrives    Chronic pain syndrome, Chronic bilateral low back pain with left-sided sciatica, S/P lumbar fusion, Lumbar disc disease with radiculopathy       NONFORMULARY      Apply 30 mLs topically 4 times daily        ondansetron 4 MG ODT tab    ZOFRAN ODT    60 tablet    Take 1-2 tablets (4-8 mg) by mouth every 6 hours as needed for nausea Take 1-2 tablets by mouth as needed for nausea    Nausea       order for DME     1 each    Nebulizer with mask and tubing    Mild persistent asthma without complication       oxyCODONE IR 5 MG tablet    ROXICODONE    18 tablet    Take 1 tablet (5 mg) by mouth every 4 hours as needed for pain maximum 4 tablet(s) per day    Chronic pain syndrome, Chronic bilateral low back pain with left-sided sciatica, S/P lumbar fusion, Lumbar disc disease with radiculopathy       polyethylene glycol Packet    MIRALAX/GLYCOLAX     Take 1 packet by mouth daily        promethazine 6.25 MG/5ML syrup    PHENERGAN    560 mL    Take 10 mLs (12.5 mg) by mouth 4 times daily as needed for nausea    Nausea       rizatriptan 5 MG ODT tab    MAXALT-MLT    18 tablet    Take 1-2 tablets (5-10 mg) by mouth at onset of  headache for migraine May repeat in 2 hours. Max 6 tablets/24 hours.    Migraine with aura and without status migrainosus, not intractable       senna-docusate 8.6-50 MG per tablet    SENOKOT-S;PERICOLACE    60 tablet    Take 1 tablet by mouth 2 times daily as needed for constipation    Drug-induced constipation       * Notice:  This list has 4 medication(s) that are the same as other medications prescribed for you. Read the directions carefully, and ask your doctor or other care provider to review them with you.

## 2017-12-19 NOTE — PATIENT INSTRUCTIONS
- Call to get appointment with orthopedics for knees     - Decrease MS Contin 15 mg to 1 tablet per day at night        Can use Oxycodone 20 mg - 1-3 tablets during the day     - After 1-2 weeks     Can try stopping MS contin 15 mg      Continue on 3-4 oxycodone per day     - Recheck in clinic in ~1 month

## 2017-12-19 NOTE — TELEPHONE ENCOUNTER
Patient presented to clinic for OV. Did not understand why it was cancelled. Huddled with CDL and informed patient she would see her. Nohemi Jennings RN, BSN

## 2017-12-19 NOTE — PROGRESS NOTES
SUBJECTIVE:   Katy Islas is a 27 year old female who presents to clinic today for the following health issues:      Concern - patient would like to discuss tapering pain medications     Description:   Has not discussed with Spine provider, was instructed to talk to CDL about tapering     Intensity: 0/10 pain today, has sore knees    Progression of Symptoms:  improving    Accompanying Signs & Symptoms:  Much better    Previous history of similar problem:   Ongoing history of pain management    Therapies Tried and outcome: Morphine Sulf 15 mg, Oxycodone 20 mg    - Dec 15th , recheck 3 months   - Saw infection doctor, stop Levaquin   - Therapy soon once wound healed over      Wound vac likely removed tomorrow     - Wants to go down on pain medications     1-2 to of oxycodone 20 mg per day plus MS contin      - Wondering about her MRI and knee pain, next step          Problem list and histories reviewed & adjusted, as indicated.  Additional history: as documented    ROS:  Constitutional, HEENT, cardiovascular, pulmonary, gi and gu systems are negative, except as otherwise noted.      OBJECTIVE:   /70 (BP Location: Left arm, Patient Position: Chair, Cuff Size: Adult Regular)  Pulse 80  Temp 98.6  F (37  C) (Oral)  Resp 18  Wt 155 lb 9.6 oz (70.6 kg)  Breastfeeding? No  BMI 28.46 kg/m2  Body mass index is 28.46 kg/(m^2).  GENERAL APPEARANCE: healthy, alert and no distress  EYES: Eyes grossly normal to inspection, PERRLA, conjunctivae and sclerae without injection or discharge, EOM intact   MS: No musculoskeletal defects are noted and gait is age appropriate without ataxia   SKIN: No suspicious lesions or rashes, hydration status appears adeuqate with normal skin turgor   PSYCH: Alert and oriented x3; speech- coherent , normal rate and volume; able to articulate logical thoughts, able to abstract reason, no tangential thoughts, no hallucinations or delusions, mentation appears normal, Mood is euthymic.  Affect is appropriate for this mood state and bright. Thought content is free of suicidal ideation, hallucinations, and delusions. Dress is adequate and upkept. Eye contact is good during conversation.       Diagnostic Test Results:  none     ASSESSMENT/PLAN:       ICD-10-CM    1. Chronic pain syndrome G89.4    2. Chronic bilateral low back pain with left-sided sciatica M54.42     G89.29    3. Acute pain of right knee M25.561 ORTHO  REFERRAL   4. Nausea R11.0 promethazine (PHENERGAN) 6.25 MG/5ML syrup     1 & 2. Pain   - Provided patient with a taper plan off pain medications as follows   - Patient does have Clonidine at home as is familiar with withdrawal symptoms and when to use it   - - Decrease MS Contin 15 mg to 1 tablet per day at night     Can use Oxycodone 20 mg - 1-3 tablets during the day   - After 1-2 weeks     Can try stopping MS contin 15 mg      Continue on 3-4 oxycodone per day   - Recheck in clinic in ~1 month, will then continue to taper off just the Oxycodone 20 mg slowly     3. Knee pain  - I only got patient's report from MRI, Will await actual report from [facility]  - Recommend as previous message, patient follow up with orthopedics to discuss     The patient indicates understanding of these issues and agrees with the plan.    Follow up: 1 month to continue pain taper and with specialist     - Patient's original appointment was canceled today as was her interpretor, patient didn't get notification of this so exam today was conducted without an interpretor     Aura Tomlinson-LOVE Moss  [facility]

## 2017-12-20 ENCOUNTER — TRANSFERRED RECORDS (OUTPATIENT)
Dept: HEALTH INFORMATION MANAGEMENT | Facility: CLINIC | Age: 27
End: 2017-12-20

## 2017-12-20 ENCOUNTER — TELEPHONE (OUTPATIENT)
Dept: ORTHOPEDICS | Facility: CLINIC | Age: 27
End: 2017-12-20

## 2017-12-20 ENCOUNTER — MYC MEDICAL ADVICE (OUTPATIENT)
Dept: ORTHOPEDICS | Facility: CLINIC | Age: 27
End: 2017-12-20

## 2017-12-21 ENCOUNTER — OFFICE VISIT (OUTPATIENT)
Dept: ORTHOPEDICS | Facility: OTHER | Age: 27
End: 2017-12-21
Payer: MEDICARE

## 2017-12-21 VITALS — TEMPERATURE: 96.8 F | WEIGHT: 155 LBS | HEIGHT: 62 IN | BODY MASS INDEX: 28.52 KG/M2

## 2017-12-21 DIAGNOSIS — M22.41 CHONDROMALACIA OF RIGHT PATELLOFEMORAL JOINT: Primary | ICD-10-CM

## 2017-12-21 DIAGNOSIS — M76.31 ILIOTIBIAL BAND SYNDROME AFFECTING LOWER LEG, RIGHT: ICD-10-CM

## 2017-12-21 PROCEDURE — 20610 DRAIN/INJ JOINT/BURSA W/O US: CPT | Mod: RT | Performed by: ORTHOPAEDIC SURGERY

## 2017-12-21 PROCEDURE — 99203 OFFICE O/P NEW LOW 30 MIN: CPT | Mod: 25 | Performed by: ORTHOPAEDIC SURGERY

## 2017-12-21 RX ORDER — TRIAMCINOLONE ACETONIDE 40 MG/ML
40 INJECTION, SUSPENSION INTRA-ARTICULAR; INTRAMUSCULAR ONCE
Qty: 1 ML | Refills: 0
Start: 2017-12-21 | End: 2017-12-21

## 2017-12-21 ASSESSMENT — PAIN SCALES - GENERAL: PAINLEVEL: MODERATE PAIN (4)

## 2017-12-21 NOTE — MR AVS SNAPSHOT
"              After Visit Summary   12/21/2017    Katy Islas    MRN: [id number]           Patient Information     Date Of Birth          1990        Visit Information        Provider Department      12/21/2017 2:40 PM Will Maldonado DO; [facility]        Today's Diagnoses     Chondromalacia of right patellofemoral joint    -  1    Iliotibial band syndrome affecting lower leg, right           Follow-ups after your visit        Additional Services     PHYSICAL THERAPY REFERRAL       *This therapy referral will be filtered to a centralized scheduling office at [facility] and the patient will receive a call to schedule an appointment at a [place] location most convenient for them. *     [facility] provides Physical Therapy evaluation and treatment and many specialty services across the [place] system.  If requesting a specialty program, please choose from the list below.    If you have not heard from the scheduling office within 2 business days, please call [phone] for all locations, with the exception of [place], please call [phone].  Treatment: Evaluation & Treatment  Special Instructions/Modalities: it band/flex  Special Programs:     Please be aware that coverage of these services is subject to the terms and limitations of your health insurance plan.  Call member services at your health plan with any benefit or coverage questions.      **Note to Provider:  If you are referring outside of [place] for the therapy appointment, please list the name of the location in the \"special instructions\" above, print the referral and give to the patient to schedule the appointment.                  Who to contact     If you have questions or need follow up information about today's clinic visit or your schedule please contact [facility] directly at [phone].  Normal or non-critical lab and imaging results will " "be communicated to you by T[employer], letter or phone within 4 business days after the clinic has received the results. If you do not hear from us within 7 days, please contact the clinic through [employer] or phone. If you have a critical or abnormal lab result, we will notify you by phone as soon as possible.  Submit refill requests through [employer] or call your pharmacy and they will forward the refill request to us. Please allow 3 business days for your refill to be completed.          Additional Information About Your Visit        [employer] Information     [employer] gives you secure access to your electronic health record. If you see a primary care provider, you can also send messages to your care team and make appointments. If you have questions, please call your primary care clinic.  If you do not have a primary care provider, please call [phone] and they will assist you.        Care EveryWhere ID     This is your Care EveryWhere ID. This could be used by other organizations to access your [facility] medical records  [id number]        Your Vitals Were     Temperature Height BMI (Body Mass Index)             96.8  F (36  C) (Temporal) 5' 2\" (1.575 m) 28.35 kg/m2          Blood Pressure from Last 3 Encounters:   12/19/17 105/70   12/05/17 116/84   11/22/17 130/64    Weight from Last 3 Encounters:   12/21/17 155 lb (70.3 kg)   12/19/17 155 lb 9.6 oz (70.6 kg)   12/05/17 150 lb (68 kg)              We Performed the Following     Kenalog 40 MG  []     Large Joint/Bursa injection and/or drainage (Shoulder, Knee)     PHYSICAL THERAPY REFERRAL          Today's Medication Changes          These changes are accurate as of: 12/21/17  3:09 PM.  If you have any questions, ask your nurse or doctor.               Start taking these medicines.        Dose/Directions    triamcinolone acetonide 40 MG/ML injection   Commonly known as:  KENALOG   Used for:  Chondromalacia of right patellofemoral joint, Iliotibial band syndrome " affecting lower leg, right   Started by:  Will Maldonado DO        Dose:  40 mg   1 mL (40 mg) by INTRA-ARTICULAR route once for 1 dose   Quantity:  1 mL   Refills:  0         Stop taking these medicines if you haven't already. Please contact your care team if you have questions.     senna-docusate 8.6-50 MG per tablet   Commonly known as:  SENOKOT-S;PERICOLACE   Stopped by:  Will Maldonado DO                Where to get your medicines      Some of these will need a paper prescription and others can be bought over the counter.  Ask your nurse if you have questions.     You don't need a prescription for these medications     triamcinolone acetonide 40 MG/ML injection                Primary Care Provider Office Phone # Fax #    Aura EMILY Rosario PA-C [phone]       [address] [place] [address] [address]  [facility] [id number]        Equal Access to Services     [facility]: Hadii pilo bishop hadasho Soomaali, waaxda luqadaha, qaybta kaalmada adeegyada, waxay calebin hayyani santo . So [facility] [phone].    ATENCIÓN: Si habla español, tiene a kerr disposición servicios gratuitos de asistencia lingüística. [place] al [phone].    We comply with applicable federal civil rights laws and [place] laws. We do not discriminate on the basis of race, color, national origin, age, disability, sex, sexual orientation, or gender identity.            Thank you!     Thank you for choosing [facility]  for your care. Our goal is always to provide you with excellent care. Hearing back from our patients is one way we can continue to improve our services. Please take a few minutes to complete the written survey that you may receive in the mail after your visit with us. Thank you!             Your Updated Medication List - Protect others around you: Learn how to safely use, store and throw away your medicines at www.disposemymeds.org.          This list is accurate as of:  12/21/17  3:09 PM.  Always use your most recent med list.                   Brand Name Dispense Instructions for use Diagnosis    * albuterol 108 (90 BASE) MCG/ACT Inhaler    PROAIR HFA/PROVENTIL HFA/VENTOLIN HFA    1 Inhaler    Inhale 2 puffs into the lungs every 6 hours as needed for shortness of breath / dyspnea or wheezing    Mild persistent asthma without complication       * albuterol (2.5 MG/3ML) 0.083% neb solution     50 vial    Take 1 vial (2.5 mg) by nebulization every 6 hours as needed for shortness of breath / dyspnea or wheezing    Mild persistent asthma without complication       ARIPiprazole 5 MG tablet    ABILIFY    30 tablet    Take 1 tablet (5 mg) by mouth At Bedtime    Adjustment disorder with mixed anxiety and depressed mood, Anxiety       * cetirizine 10 MG tablet    zyrTEC     Take 10 mg by mouth daily        * cetirizine 10 MG tablet    zyrTEC    90 tablet    Take 1 tablet (10 mg) by mouth every evening    Chronic seasonal allergic rhinitis due to pollen       cyclobenzaprine 10 MG tablet    FLEXERIL    60 tablet    Take 2 tablets (20 mg) by mouth nightly as needed for muscle spasms or other (back pain)    Chronic bilateral low back pain with left-sided sciatica       dexlansoprazole 60 MG Cpdr CR capsule    DEXILANT    30 capsule    Take 1 capsule (60 mg) by mouth daily    Gastroesophageal reflux disease without esophagitis       diazepam 5 MG tablet    VALIUM    60 tablet    Take 0.5-1 tablets (2.5-5 mg) by mouth every 12 hours as needed for anxiety or sleep (MUSCLE SPASM) (one month supply)    Chronic pain syndrome, Chronic bilateral low back pain with left-sided sciatica, Lumbar disc disease with radiculopathy, S/P lumbar fusion       dicyclomine 20 MG tablet    BENTYL    30 tablet    Take 1 tablet (20 mg) by mouth 4 times daily as needed (ABDOMINAL CRAMPING) AS NEEDED FOR CRAMPING    Hx of Crohn's disease       DULoxetine 60 MG EC capsule    CYMBALTA    30 capsule    Take 1 capsule (60  mg) by mouth daily    Adjustment disorder with mixed anxiety and depressed mood       EPINEPHrine 0.3 MG/0.3ML injection 2-pack    EPIPEN/ADRENACLICK/or ANY BX GENERIC EQUIV    0.6 mL    Inject 0.3 mLs (0.3 mg) into the muscle once as needed for anaphylaxis    Bee sting allergy       gabapentin 300 MG capsule    NEURONTIN    90 capsule    Take 1 capsule (300 mg) by mouth 3 times daily    Chronic bilateral low back pain with left-sided sciatica       meclizine 12.5 MG tablet    ANTIVERT    30 tablet    Take 1 tablet (12.5 mg) by mouth 4 times daily as needed for dizziness    Dizziness       medical cannabis inhalation (Patient's own supply.  Not a prescription)      Inhale into the lungs 3 times daily as needed Reported on 3/28/2017        medroxyPROGESTERone 150 MG/ML injection    DEPO-PROVERA    3 mL    Inject 1 mL (150 mg) into the muscle every 3 months    Encounter for surveillance of injectable contraceptive, Dysmenorrhea       morphine 15 MG 12 hr tablet    MS CONTIN     TK 1 T PO  Q 12 H. MAX 2 TS D        naloxone nasal spray    NARCAN    0.2 mL    Spray 1 spray (4 mg) into one nostril alternating nostrils as needed for opioid reversal every 2-3 minutes until assistance arrives    Chronic pain syndrome, Chronic bilateral low back pain with left-sided sciatica, S/P lumbar fusion, Lumbar disc disease with radiculopathy       NONFORMULARY      Apply 30 mLs topically 4 times daily        ondansetron 4 MG ODT tab    ZOFRAN ODT    60 tablet    Take 1-2 tablets (4-8 mg) by mouth every 6 hours as needed for nausea Take 1-2 tablets by mouth as needed for nausea    Nausea       order for DME     1 each    Nebulizer with mask and tubing    Mild persistent asthma without complication       oxyCODONE IR 5 MG tablet    ROXICODONE    18 tablet    Take 1 tablet (5 mg) by mouth every 4 hours as needed for pain maximum 4 tablet(s) per day    Chronic pain syndrome, Chronic bilateral low back pain with left-sided sciatica, S/P  lumbar fusion, Lumbar disc disease with radiculopathy       polyethylene glycol Packet    MIRALAX/GLYCOLAX     Take 1 packet by mouth daily        promethazine 6.25 MG/5ML syrup    PHENERGAN    560 mL    Take 10 mLs (12.5 mg) by mouth 4 times daily as needed for nausea    Nausea       rizatriptan 5 MG ODT tab    MAXALT-MLT    18 tablet    Take 1-2 tablets (5-10 mg) by mouth at onset of headache for migraine May repeat in 2 hours. Max 6 tablets/24 hours.    Migraine with aura and without status migrainosus, not intractable       triamcinolone acetonide 40 MG/ML injection    KENALOG    1 mL    1 mL (40 mg) by INTRA-ARTICULAR route once for 1 dose    Chondromalacia of right patellofemoral joint, Iliotibial band syndrome affecting lower leg, right       * Notice:  This list has 4 medication(s) that are the same as other medications prescribed for you. Read the directions carefully, and ask your doctor or other care provider to review them with you.

## 2017-12-21 NOTE — NURSING NOTE
"Chief Complaint   Patient presents with     Knee Pain     RIGHT KNEE PAIN     Consult     REF: KIMBERLEY MAYO PA-C       Initial Temp 96.8  F (36  C) (Temporal)  Ht 1.575 m (5' 2\")  Wt 70.3 kg (155 lb)  BMI 28.35 kg/m2 Estimated body mass index is 28.35 kg/(m^2) as calculated from the following:    Height as of this encounter: 1.575 m (5' 2\").    Weight as of this encounter: 70.3 kg (155 lb).  Medication Reconciliation: angélica Hudson/ANAND     "

## 2017-12-21 NOTE — PROGRESS NOTES
ORTHOPEDIC CONSULT      Chief Complaint: Katy Islas is a 27 year old female who is being seen for Chief Complaint   Patient presents with     Knee Pain     RIGHT KNEE PAIN     Consult     REF: AMBER MAYO PA-C       History of Present Illness:   Katy Islas is a 27 year old female who is seen in consultation at the request of Amber Mayo for evaluation of right knee pain.  Mechanism of Injury: No trauma or inciting event.  Location: right knee lateral, anterior  Duration of Pain: On and off for a few years  Rating of Pain:  moderate.    Pain Quality: aching and sharp  Pain is better with: Rest  Pain is worse with: Walking  Treatment so far consists of: Narcotics, previous injections into her knees years ago.   Associated Features: Swelling  Prior history of related problems:    utilized throughout her visit  She had back surgery October 2016.            Patient's past medical, surgical, social and family histories reviewed.     Past Medical History:   Diagnosis Date     Abdominal pain 10/31- 11/4/2005    [facility] admit for Crohn's     Allergic rhinitis, cause unspecified     Allergic rhinitis     Arthritis      C. difficile diarrhea     Past, no current diarrhea.     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     FUSION SPINE POSTERIOR MINIMALLY INVASIVE ONE LEVEL N/A 2/23/2017    Procedure: FUSION SPINE POSTERIOR MINIMALLY INVASIVE ONE LEVEL;  L4-5 Oblique Lateral Lumbar Interbody Fusion   Epidural steroid injection.   Transpedicular Bone marrow aspiration;  Surgeon: Jeniffer Eugene MD;  Location: [facility] OR     HC COLONOSCOPY THRU STOMA WITH BIOPSY  10/29/2003    Impression is that of normal appearing colonoscopy, without evidence of rectal bleeding.     HC COLONOSCOPY THRU STOMA, DIAGNOSTIC  10/00    normal     HC COLONOSCOPY THRU STOMA, DIAGNOSTIC  Oct 2009    Dr López- normal     HC EEG AWAKE AND SLEEP      abnormal     HC MRI BRAIN W/O CONTRAST  12/00    normal     HC REMOVAL GALLBLADDER  8/5/2009    [facility].     HC UGI ENDOSCOPY DIAG W BIOPSY  11/11/09    Normal esophagus     HC UGI ENDOSCOPY, SIMPLE EXAM  7/00, 10/00    mild chronic esophagitis and duodenitis, neg H pylori     HC UGI ENDOSCOPY, SIMPLE EXAM  01/20/2005    Esophagogastroduodenoscopy, colonoscopy with biopsies.  [facility] UGI ENDOSCOPY, SIMPLE EXAM  7/1/2009    [facility].      UGI ENDOSCOPY, SIMPLE EXAM  11/11/2009    attempted upper GI, pt. could not tolerate procedure:[facility]     ORTHOPEDIC SURGERY  October 19,2011    diskectomy L4-L5       Medications:    Current Outpatient Prescriptions on File Prior to Visit:  promethazine (PHENERGAN) 6.25 MG/5ML syrup Take 10 mLs (12.5 mg) by mouth 4 times daily as needed for nausea   morphine (MS CONTIN) 15 MG 12 hr tablet TK 1 T PO  Q 12  H. MAX 2 TS D   DULoxetine (CYMBALTA) 60 MG EC capsule Take 1 capsule (60 mg) by mouth daily   rizatriptan (MAXALT-MLT) 5 MG ODT tab Take 1-2 tablets (5-10 mg) by mouth at onset of headache for migraine May repeat in 2 hours. Max 6 tablets/24 hours.   meclizine (ANTIVERT) 12.5 MG tablet Take 1 tablet (12.5 mg) by mouth 4 times daily as needed for dizziness   oxyCODONE IR (ROXICODONE) 5 MG tablet Take 1 tablet (5 mg) by mouth every 4 hours as needed for pain maximum 4 tablet(s) per day   polyethylene glycol (MIRALAX/GLYCOLAX) Packet Take 1 packet by mouth daily   cetirizine (ZYRTEC) 10 MG tablet Take 1 tablet (10 mg) by mouth every evening   gabapentin (NEURONTIN) 300 MG capsule Take 1 capsule (300 mg) by mouth 3 times daily   cyclobenzaprine (FLEXERIL) 10 MG tablet Take 2 tablets (20 mg) by mouth nightly as needed for muscle spasms or other (back pain)   dexlansoprazole (DEXILANT) 60 MG CPDR CR capsule Take 1 capsule (60 mg) by mouth daily   albuterol (PROAIR HFA/PROVENTIL HFA/VENTOLIN HFA) 108 (90 BASE) MCG/ACT Inhaler Inhale 2 puffs into the lungs every 6 hours as needed for shortness of breath / dyspnea or wheezing   dicyclomine (BENTYL) 20 MG tablet Take 1 tablet (20 mg) by mouth 4 times daily as needed (ABDOMINAL CRAMPING) AS NEEDED FOR CRAMPING   ARIPiprazole (ABILIFY) 5 MG tablet Take 1 tablet (5 mg) by mouth At Bedtime   ondansetron (ZOFRAN ODT) 4 MG ODT tab Take 1-2 tablets (4-8 mg) by mouth every 6 hours as needed for nausea Take 1-2 tablets by mouth as needed for nausea   medroxyPROGESTERone (DEPO-PROVERA) 150 MG/ML injection Inject 1 mL (150 mg) into the muscle every 3 months   diazepam (VALIUM) 5 MG tablet Take 0.5-1 tablets (2.5-5 mg) by mouth every 12 hours as needed for anxiety or sleep (MUSCLE SPASM) (one month supply)   naloxone (NARCAN) nasal spray Spray 1 spray (4 mg) into one nostril alternating nostrils as needed for opioid reversal every 2-3 minutes until assistance arrives (Patient not  taking: Reported on 12/19/2017)   NONFORMULARY Apply 30 mLs topically 4 times daily   albuterol (2.5 MG/3ML) 0.083% neb solution Take 1 vial (2.5 mg) by nebulization every 6 hours as needed for shortness of breath / dyspnea or wheezing   order for DME Nebulizer with mask and tubing   cetirizine (ZYRTEC) 10 MG tablet Take 10 mg by mouth daily   medical cannabis inhalation (Patient's own supply.  Not a prescription) Inhale into the lungs 3 times daily as needed Reported on 3/28/2017   EPINEPHrine (EPIPEN) 0.3 MG/0.3ML injection Inject 0.3 mLs (0.3 mg) into the muscle once as needed for anaphylaxis     No current facility-administered medications on file prior to visit.     Allergies   Allergen Reactions     Ativan [Lorazepam] Hives     At the IV site     Baclofen      hives     Bees Hives, Swelling and Difficulty breathing     Caffeine      Contrast Dye      Hives,   Updated 5/10/2016 CT Contrast.     Diazepam      IV form makes her become aggressive and angry     Iodine Hives     Methocarbamol Swelling     Metoclopramide      Other reaction(s): Tremors  LW Reaction: shaking/sweating     Midazolam      Other reaction(s): Agitation     Monosodium Glutamate      Morphine Other (See Comments)     Difficulty with urination     Nsaids Other (See Comments)     GI BLEED x2     Other (Do Not Use) Other (See Comments)     Xanaflex- pt becomes disoriented and loses bladder control     Percocet [Oxycodone-Acetaminophen] Itching     Reglan [Metoclopramide Hcl] Other (See Comments)     shaking     Soma [Carisoprodol] Visual Disturbance     Sleep walking     Tizanidine      Topamax Other (See Comments)     Topamax [Topiramate] Nausea     Tingling  GI/Vomit     Tramadol      Severe Headache, Seizure Risk     Tylenol W/Codeine [Acetaminophen-Codeine] Nausea and Itching     Tylenol 3     Valium Other (See Comments)     Becomes aggressive and angry     Versed Other (See Comments)     Zolmitriptan      Makes face feel like its twitching  "    Droperidol Anxiety     Flu Virus Vaccine Rash      Arm swelling       Social History     Occupational History     Not on file.     Social History Main Topics     Smoking status: Current Every Day Smoker     Packs/day: 0.25     Types: Cigarettes     Smokeless tobacco: Never Used     Alcohol use 0.6 oz/week     1 Cans of beer per week      Comment: 1-2 drinks montlhly     Drug use: Yes      Comment: Medical Marijuana currently     Sexual activity: Yes     Partners: Male     Birth control/ protection: , Pill, OCP       Family History   Problem Relation Age of Onset     GASTROINTESTINAL DISEASE Brother      severe Crohn's     Neurologic Disorder Brother      Seizures post head injury     Depression Brother      Substance Abuse Brother      Genitourinary Problems Father      kidney stones     DIABETES Father      HEART DISEASE Father      Open heart surgery     Breast Cancer Maternal Grandmother      Parkinsonism Maternal Grandmother      CEREBROVASCULAR DISEASE Paternal Grandmother      CANCER Maternal Grandfather      Lung     Cardiovascular Paternal Grandfather      Heart Attack     Substance Abuse Mother      Lung Cancer Paternal Uncle        REVIEW OF SYSTEMS  10 point review systems performed otherwise negative as noted as per history of present illness.    Physical Exam:  Vitals: Temp 96.8  F (36  C) (Temporal)  Ht 5' 2\" (1.575 m)  Wt 155 lb (70.3 kg)  BMI 28.35 kg/m2  BMI= Body mass index is 28.35 kg/(m^2).  Constitutional: healthy, alert and no acute distress   Psychiatric: mentation appears normal and affect normal/bright  NEURO: no focal deficits  RESP: Normal with easy respirations and no use of accessory muscles to breathe, no audible wheezing or retractions  CV: RLE: no edema         Regular rate and rhythm by palpation  SKIN: No erythema, rashes, excoriation, or breakdown. No evidence of infection.   JOINT/EXTREMITIES:right knee: 0-125  active motion.  Patellofemoral crepitus with range of motion.  " No effusion.  Exquisite tenderness over the lateral condyle and distal IT band insertion and Gerdy's.  She also has some lateral joint line pain.  No patellofemoral discomfort.  Negative Joe.  Tuchman.  Negative anterior posterior drawer.  Positive Tomy     GAIT: not tested     Diagnostic Modalities:  right knee X-ray: No fractures or dislocations.  Joint spaces are well-preserved.  There appears to be a small osteophyte off the medial patellar facet  right knee MRI: No fractures or dislocations.  No meniscus tear.  Cruciate ligaments are intact.  Collateral ligaments are intact.  Medial lateral compartment showed no chondromalacia.  Patellofemoral has some chondromalacia throughout.  Independent visualization of the images was performed.      Impression: right distal IT band syndrome  Right knee patellofemoral chondromalacia    Plan:  All of the above pertinent physical exam and imaging modalities findings was reviewed with Katy though the use of a .                                          INJECTION PROCEDURE:  The patient was counseled about an  injection, including discussion of risks (including infection), contents of the injection, rationale for performing the injection, and expected benefits of the injection. The skin was prepped with alcohol and betadine and then utilizing sterile technique an injection of the right knee ITB bursa from the lateral approach deep to the tendon was performed. The injection consisted 1ml of Kenalog (40mg per 1 ml) mixed with 3ml of 0.5% Marcaine. The patient tolerated the injection well, and there were no complications. The injection site was covered with a Band-Aid. The injection was performed by Tomi Maldonado D.O.    I have also recommended flexibility through physical therapy to address the situation.  She does have some patellofemoral crepitus and findings on MRI.  However today the IT band seems to be the main focus of her pain.  I did recommend  if she has continued pain after approximately 10-14 days to return.  We could consider intra-articular steroid injection.    Return to clinic 2, week(s), PRN, or sooner as needed for changes.  Re-x-ray on return: Brigitte Maldonado D.O.

## 2017-12-21 NOTE — LETTER
12/21/2017         RE: Katy Islas  [address] [place] [address]        Dear Colleague,    Thank you for referring your patient, Katy Islas, to the [facility]. Please see a copy of my visit note below.    ORTHOPEDIC CONSULT      Chief Complaint: Katy Islas is a 27 year old female who is being seen for Chief Complaint   Patient presents with     Knee Pain     RIGHT KNEE PAIN     Consult     REF: KIMBERLEY MAYO PA-C       History of Present Illness:   Katy Islas is a 27 year old female who is seen in consultation at the request of Kimberley Mayo for evaluation of right knee pain.  Mechanism of Injury: No trauma or inciting event.  Location: right knee lateral, anterior  Duration of Pain: On and off for a few years  Rating of Pain:  moderate.    Pain Quality: aching and sharp  Pain is better with: Rest  Pain is worse with: Walking  Treatment so far consists of: Narcotics, previous injections into her knees years ago.   Associated Features: Swelling  Prior history of related problems:    utilized throughout her visit  She had back surgery October 2016.            Patient's past medical, surgical, social and family histories reviewed.     Past Medical History:   Diagnosis Date     Abdominal pain 10/31- 11/4/2005    [facility] admit for Cro[facility]     Allergic rhinitis, cause unspecified     Allergic rhinitis     Arthritis      C. difficile diarrhea     Past, no current diarrhea.     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, without mention of  intractable epilepsy     pseudoseizures diagnosed after extensive neurologic eval     Migraine 07/21/12    D/C 07/22/12-[facility]     Migraine, unspecified, without mention of intractable migraine without mention of status migrainosus     Migraine     Mild intermittent asthma     mild intermittent     Mycoplasma infection in conditions classified elsewhere and of unspecified site      Other chronic pain     Back pain for 6 years     Renal disease     Kidney stones     Unspecified hearing loss     congenital hearing loss       Past Surgical History:   Procedure Laterality Date     COLONOSCOPY  7/1/2009    [facility].     FUSION SPINE POSTERIOR MINIMALLY INVASIVE ONE LEVEL N/A 2/23/2017    Procedure: FUSION SPINE POSTERIOR MINIMALLY INVASIVE ONE LEVEL;  L4-5 Oblique Lateral Lumbar Interbody Fusion   Epidural steroid injection.   Transpedicular Bone marrow aspiration;  Surgeon: Jeniffer Eugene MD;  Location: [facility] OR     HC COLONOSCOPY THRU STOMA WITH BIOPSY  10/29/2003    Impression is that of normal appearing colonoscopy, without evidence of rectal bleeding.     HC COLONOSCOPY THRU STOMA, DIAGNOSTIC  10/00    normal     HC COLONOSCOPY THRU STOMA, DIAGNOSTIC  Oct 2009    Dr López- normal     HC EEG AWAKE AND SLEEP      abnormal     HC MRI BRAIN W/O CONTRAST  12/00    normal     HC REMOVAL GALLBLADDER  8/5/2009    [facility], [facility].     HC UGI ENDOSCOPY DIAG W BIOPSY  11/11/09    Normal esophagus     HC UGI ENDOSCOPY, SIMPLE EXAM  7/00, 10/00    mild chronic esophagitis and duodenitis, neg H pylori     HC UGI ENDOSCOPY, SIMPLE EXAM  01/20/2005    Esophagogastroduodenoscopy, colonoscopy with biopsies.  [facility] UGI ENDOSCOPY, SIMPLE EXAM  7/1/2009    [facility], [facility].      UGI ENDOSCOPY, SIMPLE EXAM  11/11/2009    attempted upper GI, pt. could not tolerate procedure:[facility]     ORTHOPEDIC SURGERY  October 19,2011    diskectomy  L4-L5       Medications:    Current Outpatient Prescriptions on File Prior to Visit:  promethazine (PHENERGAN) 6.25 MG/5ML syrup Take 10 mLs (12.5 mg) by mouth 4 times daily as needed for nausea   morphine (MS CONTIN) 15 MG 12 hr tablet TK 1 T PO  Q 12 H. MAX 2 TS D   DULoxetine (CYMBALTA) 60 MG EC capsule Take 1 capsule (60 mg) by mouth daily   rizatriptan (MAXALT-MLT) 5 MG ODT tab Take 1-2 tablets (5-10 mg) by mouth at onset of headache for migraine May repeat in 2 hours. Max 6 tablets/24 hours.   meclizine (ANTIVERT) 12.5 MG tablet Take 1 tablet (12.5 mg) by mouth 4 times daily as needed for dizziness   oxyCODONE IR (ROXICODONE) 5 MG tablet Take 1 tablet (5 mg) by mouth every 4 hours as needed for pain maximum 4 tablet(s) per day   polyethylene glycol (MIRALAX/GLYCOLAX) Packet Take 1 packet by mouth daily   cetirizine (ZYRTEC) 10 MG tablet Take 1 tablet (10 mg) by mouth every evening   gabapentin (NEURONTIN) 300 MG capsule Take 1 capsule (300 mg) by mouth 3 times daily   cyclobenzaprine (FLEXERIL) 10 MG tablet Take 2 tablets (20 mg) by mouth nightly as needed for muscle spasms or other (back pain)   dexlansoprazole (DEXILANT) 60 MG CPDR CR capsule Take 1 capsule (60 mg) by mouth daily   albuterol (PROAIR HFA/PROVENTIL HFA/VENTOLIN HFA) 108 (90 BASE) MCG/ACT Inhaler Inhale 2 puffs into the lungs every 6 hours as needed for shortness of breath / dyspnea or wheezing   dicyclomine (BENTYL) 20 MG tablet Take 1 tablet (20 mg) by mouth 4 times daily as needed (ABDOMINAL CRAMPING) AS NEEDED FOR CRAMPING   ARIPiprazole (ABILIFY) 5 MG tablet Take 1 tablet (5 mg) by mouth At Bedtime   ondansetron (ZOFRAN ODT) 4 MG ODT tab Take 1-2 tablets (4-8 mg) by mouth every 6 hours as needed for nausea Take 1-2 tablets by mouth as needed for nausea   medroxyPROGESTERone (DEPO-PROVERA) 150 MG/ML injection Inject 1 mL (150 mg) into the muscle every 3 months   diazepam (VALIUM) 5 MG tablet Take 0.5-1 tablets (2.5-5 mg) by mouth every 12  hours as needed for anxiety or sleep (MUSCLE SPASM) (one month supply)   naloxone (NARCAN) nasal spray Spray 1 spray (4 mg) into one nostril alternating nostrils as needed for opioid reversal every 2-3 minutes until assistance arrives (Patient not taking: Reported on 12/19/2017)   NONFORMULARY Apply 30 mLs topically 4 times daily   albuterol (2.5 MG/3ML) 0.083% neb solution Take 1 vial (2.5 mg) by nebulization every 6 hours as needed for shortness of breath / dyspnea or wheezing   order for DME Nebulizer with mask and tubing   cetirizine (ZYRTEC) 10 MG tablet Take 10 mg by mouth daily   medical cannabis inhalation (Patient's own supply.  Not a prescription) Inhale into the lungs 3 times daily as needed Reported on 3/28/2017   EPINEPHrine (EPIPEN) 0.3 MG/0.3ML injection Inject 0.3 mLs (0.3 mg) into the muscle once as needed for anaphylaxis     No current facility-administered medications on file prior to visit.     Allergies   Allergen Reactions     Ativan [Lorazepam] Hives     At the IV site     Baclofen      hives     Bees Hives, Swelling and Difficulty breathing     Caffeine      Contrast Dye      Hives,   Updated 5/10/2016 CT Contrast.     Diazepam      IV form makes her become aggressive and angry     Iodine Hives     Methocarbamol Swelling     Metoclopramide      Other reaction(s): Tremors  LW Reaction: shaking/sweating     Midazolam      Other reaction(s): Agitation     Monosodium Glutamate      Morphine Other (See Comments)     Difficulty with urination     Nsaids Other (See Comments)     GI BLEED x2     Other (Do Not Use) Other (See Comments)     Xanaflex- pt becomes disoriented and loses bladder control     Percocet [Oxycodone-Acetaminophen] Itching     Reglan [Metoclopramide Hcl] Other (See Comments)     shaking     Soma [Carisoprodol] Visual Disturbance     Sleep walking     Tizanidine      Topamax Other (See Comments)     Topamax [Topiramate] Nausea     Tingling  GI/Vomit     Tramadol      Severe  "Headache, Seizure Risk     Tylenol W/Codeine [Acetaminophen-Codeine] Nausea and Itching     Tylenol 3     Valium Other (See Comments)     Becomes aggressive and angry     Versed Other (See Comments)     Zolmitriptan      Makes face feel like its twitching     Droperidol Anxiety     Flu Virus Vaccine Rash      Arm swelling       Social History     Occupational History     Not on file.     Social History Main Topics     Smoking status: Current Every Day Smoker     Packs/day: 0.25     Types: Cigarettes     Smokeless tobacco: Never Used     Alcohol use 0.6 oz/week     1 Cans of beer per week      Comment: 1-2 drinks montlhly     Drug use: Yes      Comment: Medical Marijuana currently     Sexual activity: Yes     Partners: Male     Birth control/ protection: , Pill, OCP       Family History   Problem Relation Age of Onset     GASTROINTESTINAL DISEASE Brother      severe Crohn's     Neurologic Disorder Brother      Seizures post head injury     Depression Brother      Substance Abuse Brother      Genitourinary Problems Father      kidney stones     DIABETES Father      HEART DISEASE Father      Open heart surgery     Breast Cancer Maternal Grandmother      Parkinsonism Maternal Grandmother      CEREBROVASCULAR DISEASE Paternal Grandmother      CANCER Maternal Grandfather      Lung     Cardiovascular Paternal Grandfather      Heart Attack     Substance Abuse Mother      Lung Cancer Paternal Uncle        REVIEW OF SYSTEMS  10 point review systems performed otherwise negative as noted as per history of present illness.    Physical Exam:  Vitals: Temp 96.8  F (36  C) (Temporal)  Ht 5' 2\" (1.575 m)  Wt 155 lb (70.3 kg)  BMI 28.35 kg/m2  BMI= Body mass index is 28.35 kg/(m^2).  Constitutional: healthy, alert and no acute distress   Psychiatric: mentation appears normal and affect normal/bright  NEURO: no focal deficits  RESP: Normal with easy respirations and no use of accessory muscles to breathe, no audible wheezing or " retractions  CV: RLE: no edema         Regular rate and rhythm by palpation  SKIN: No erythema, rashes, excoriation, or breakdown. No evidence of infection.   JOINT/EXTREMITIES:right knee: 0-125  active motion.  Patellofemoral crepitus with range of motion.  No effusion.  Exquisite tenderness over the lateral condyle and distal IT band insertion and Gerdy's.  She also has some lateral joint line pain.  No patellofemoral discomfort.  Negative Joe.  Tuchman.  Negative anterior posterior drawer.  Positive Tomy     GAIT: not tested     Diagnostic Modalities:  right knee X-ray: No fractures or dislocations.  Joint spaces are well-preserved.  There appears to be a small osteophyte off the medial patellar facet  right knee MRI: No fractures or dislocations.  No meniscus tear.  Cruciate ligaments are intact.  Collateral ligaments are intact.  Medial lateral compartment showed no chondromalacia.  Patellofemoral has some chondromalacia throughout.  Independent visualization of the images was performed.      Impression: right distal IT band syndrome  Right knee patellofemoral chondromalacia    Plan:  All of the above pertinent physical exam and imaging modalities findings was reviewed with Katy though the use of a .                                          INJECTION PROCEDURE:  The patient was counseled about an  injection, including discussion of risks (including infection), contents of the injection, rationale for performing the injection, and expected benefits of the injection. The skin was prepped with alcohol and betadine and then utilizing sterile technique an injection of the right knee ITB bursa from the lateral approach deep to the tendon was performed. The injection consisted 1ml of Kenalog (40mg per 1 ml) mixed with 3ml of 0.5% Marcaine. The patient tolerated the injection well, and there were no complications. The injection site was covered with a Band-Aid. The injection was performed  by Tomi Maldonado D.O.    I have also recommended flexibility through physical therapy to address the situation.  She does have some patellofemoral crepitus and findings on MRI.  However today the IT band seems to be the main focus of her pain.  I did recommend if she has continued pain after approximately 10-14 days to return.  We could consider intra-articular steroid injection.    Return to clinic 2, week(s), PRN, or sooner as needed for changes.  Re-x-ray on return: No    Tomi Maldonado D.O.    Again, thank you for allowing me to participate in the care of your patient.        Sincerely,        Will Maldonado, DO

## 2018-01-11 ENCOUNTER — MYC MEDICAL ADVICE (OUTPATIENT)
Dept: FAMILY MEDICINE | Facility: OTHER | Age: 28
End: 2018-01-11

## 2018-01-11 ENCOUNTER — TELEPHONE (OUTPATIENT)
Dept: FAMILY MEDICINE | Facility: OTHER | Age: 28
End: 2018-01-11

## 2018-01-12 NOTE — TELEPHONE ENCOUNTER
Patient called with  tonight. She just woke up and missed her appointment today. She stated that she needs to get a refill on her pain medication or else she is going to have withdrawals. She is depressed and feels like killing herself. Offered FNA triage and she declined and stated she does not want to be on a 72 hr hold. She demanded for an appointment tomorrow with EMILY Thayer. I informed her that the provider is fully booked but I can offer to send a message to see if she can be worked in. She stated that she does not care. She will come in at 8am to be seen. I informed her that I cannot schedule the appointment because the schedule is full, so I cannot guarantee that she will be seen. She still stated that she does not care. She stated that she was asking me nicely to schedule her an appointment but also used vulgar language during the call, via . I reiterated that I cannot schedule but I can send a message, then she disconnected the call.       Dayana KLEIN  Central Scheduler

## 2018-01-13 ENCOUNTER — MYC MEDICAL ADVICE (OUTPATIENT)
Dept: FAMILY MEDICINE | Facility: OTHER | Age: 28
End: 2018-01-13

## 2018-01-14 ENCOUNTER — TRANSFERRED RECORDS (OUTPATIENT)
Dept: HEALTH INFORMATION MANAGEMENT | Facility: CLINIC | Age: 28
End: 2018-01-14

## 2018-01-15 ENCOUNTER — CARE COORDINATION (OUTPATIENT)
Dept: CARE COORDINATION | Facility: CLINIC | Age: 28
End: 2018-01-15

## 2018-01-15 ENCOUNTER — TELEPHONE (OUTPATIENT)
Dept: FAMILY MEDICINE | Facility: OTHER | Age: 28
End: 2018-01-15

## 2018-01-15 ENCOUNTER — OFFICE VISIT (OUTPATIENT)
Dept: FAMILY MEDICINE | Facility: OTHER | Age: 28
End: 2018-01-15
Payer: MEDICARE

## 2018-01-15 VITALS
RESPIRATION RATE: 12 BRPM | WEIGHT: 147 LBS | OXYGEN SATURATION: 99 % | HEART RATE: 84 BPM | HEIGHT: 61 IN | SYSTOLIC BLOOD PRESSURE: 124 MMHG | BODY MASS INDEX: 27.75 KG/M2 | TEMPERATURE: 98.8 F | DIASTOLIC BLOOD PRESSURE: 88 MMHG

## 2018-01-15 DIAGNOSIS — R11.0 NAUSEA: ICD-10-CM

## 2018-01-15 DIAGNOSIS — Z30.42 ENCOUNTER FOR SURVEILLANCE OF INJECTABLE CONTRACEPTIVE: ICD-10-CM

## 2018-01-15 DIAGNOSIS — G89.29 CHRONIC BILATERAL LOW BACK PAIN WITH LEFT-SIDED SCIATICA: ICD-10-CM

## 2018-01-15 DIAGNOSIS — M54.42 CHRONIC BILATERAL LOW BACK PAIN WITH LEFT-SIDED SCIATICA: ICD-10-CM

## 2018-01-15 DIAGNOSIS — F31.63 BIPOLAR DISORDER, CURRENT EPISODE MIXED, SEVERE, WITHOUT PSYCHOTIC FEATURES (H): ICD-10-CM

## 2018-01-15 DIAGNOSIS — F32.0 MILD MAJOR DEPRESSION (H): ICD-10-CM

## 2018-01-15 DIAGNOSIS — F41.9 ANXIETY: ICD-10-CM

## 2018-01-15 DIAGNOSIS — F43.23 ADJUSTMENT DISORDER WITH MIXED ANXIETY AND DEPRESSED MOOD: Primary | ICD-10-CM

## 2018-01-15 LAB — BETA HCG QUAL IFA URINE: NEGATIVE

## 2018-01-15 PROCEDURE — 99214 OFFICE O/P EST MOD 30 MIN: CPT | Performed by: PHYSICIAN ASSISTANT

## 2018-01-15 PROCEDURE — 84703 CHORIONIC GONADOTROPIN ASSAY: CPT | Performed by: PHYSICIAN ASSISTANT

## 2018-01-15 RX ORDER — OXYCODONE HYDROCHLORIDE 10 MG/1
10 TABLET ORAL EVERY 4 HOURS PRN
Qty: 112 TABLET | Refills: 0 | Status: SHIPPED | OUTPATIENT
Start: 2018-01-15 | End: 2018-01-29

## 2018-01-15 RX ORDER — DULOXETIN HYDROCHLORIDE 60 MG/1
60 CAPSULE, DELAYED RELEASE ORAL DAILY
Qty: 90 CAPSULE | Refills: 3 | Status: SHIPPED | OUTPATIENT
Start: 2018-01-15 | End: 2021-07-26

## 2018-01-15 RX ORDER — OXYCODONE HYDROCHLORIDE 5 MG/1
5 TABLET ORAL EVERY 4 HOURS PRN
Qty: 18 TABLET | Refills: 0 | Status: CANCELLED | OUTPATIENT
Start: 2018-01-15

## 2018-01-15 RX ORDER — ARIPIPRAZOLE 10 MG/1
10 TABLET ORAL AT BEDTIME
Qty: 30 TABLET | Refills: 1 | Status: SHIPPED | OUTPATIENT
Start: 2018-01-15 | End: 2018-02-08

## 2018-01-15 RX ORDER — GABAPENTIN 300 MG/1
300 CAPSULE ORAL 3 TIMES DAILY
Qty: 90 CAPSULE | Refills: 3 | Status: SHIPPED | OUTPATIENT
Start: 2018-01-15 | End: 2018-02-08

## 2018-01-15 ASSESSMENT — ANXIETY QUESTIONNAIRES
GAD7 TOTAL SCORE: 21
GAD7 TOTAL SCORE: 21
7. FEELING AFRAID AS IF SOMETHING AWFUL MIGHT HAPPEN: NEARLY EVERY DAY
GAD7 TOTAL SCORE: 21
2. NOT BEING ABLE TO STOP OR CONTROL WORRYING: NEARLY EVERY DAY
6. BECOMING EASILY ANNOYED OR IRRITABLE: NEARLY EVERY DAY
1. FEELING NERVOUS, ANXIOUS, OR ON EDGE: NEARLY EVERY DAY
7. FEELING AFRAID AS IF SOMETHING AWFUL MIGHT HAPPEN: NEARLY EVERY DAY
5. BEING SO RESTLESS THAT IT IS HARD TO SIT STILL: NEARLY EVERY DAY
4. TROUBLE RELAXING: NEARLY EVERY DAY
3. WORRYING TOO MUCH ABOUT DIFFERENT THINGS: NEARLY EVERY DAY

## 2018-01-15 ASSESSMENT — PAIN SCALES - GENERAL: PAINLEVEL: MODERATE PAIN (4)

## 2018-01-15 ASSESSMENT — PATIENT HEALTH QUESTIONNAIRE - PHQ9
SUM OF ALL RESPONSES TO PHQ QUESTIONS 1-9: 20
10. IF YOU CHECKED OFF ANY PROBLEMS, HOW DIFFICULT HAVE THESE PROBLEMS MADE IT FOR YOU TO DO YOUR WORK, TAKE CARE OF THINGS AT HOME, OR GET ALONG WITH OTHER PEOPLE: VERY DIFFICULT
SUM OF ALL RESPONSES TO PHQ QUESTIONS 1-9: 20

## 2018-01-15 NOTE — LETTER
[facility] [place] CARE COORDINATION    January 15, 2018      Katy Islas  [address] [place] [address]      Dear Katy,    I am assisting the clinic  at [facility].  Thank you for your time on the phone.  I wanted to introduce myself and provide you with my contact information so that you can call me with questions or concerns about your health care.     The clinic care coordinator is a registered nurse and/or  who understand the health care system. The goal of clinic care coordination is to help you manage your health and improve access to the [facility] in the most efficient manner. The registered nurse can assist you in meeting your health care goals by providing education, coordinating services, and strengthening the communication among your providers. The  can assist you with financial, behavioral, psychosocial, chemical dependency, counseling, and/or psychiatric resources.    Please feel free to contact me at [phone] with any questions or concerns. We at [facility] are focused on providing you with the highest-quality healthcare experience possible and that all starts with you.     Sincerely,     Megan Palafox, DOYLE, MSW  , Clinical Care Coordination  [facility]  [phone]    On behalf of Jennifer Hillman [facility]  [phone]    Enclosed: I have enclosed a copy of a 24 Hour Access Plan. This has helpful phone numbers for you to call when needed. Please keep this in an easy to access place to use as needed.

## 2018-01-15 NOTE — TELEPHONE ENCOUNTER
I spoke with patient who states she would like the appointment that was put on hold for her today. She states she feels very depressed and needs to see the doctor today. She says that she feels like killing herself but does not have a plan to carry it out. I requested that she seek emergency care now or I could call an ambulance. She told me not to call an ambulance or the police. She said she was home with her mom right now and just wants to see CDL today. I asked why she is so depressed and she said that she is getting evicted from her home and that she misses her mom. She said that her mom was here for a week to visit but now has left. When I questioned if her mom was there with her now she changed it to my mom is leaving soon and she is sad. She asked if her mom should come with her to her appointment today and I told her that is her choice.     Next 5 appointments (look out 90 days)     Nikhil 15, 2018  1:45 PM CST   Office Visit with Aura Rosario PA-C, Assignments Open, KIS ASL, ASL IS   [facility] ([facility])    [address]  [place] [address]   [phone]                Claudine Jimenez, RN, BSN

## 2018-01-15 NOTE — MR AVS SNAPSHOT
After Visit Summary   1/15/2018    Katy Islas    MRN: [id number]           Patient Information     Date Of Birth          1990        Visit Information        Provider Department      1/15/2018 1:45 PM Aura Rosario PA-C; [facility]SL [facility]        Today's Diagnoses     Adjustment disorder with mixed anxiety and depressed mood    -  1    Bipolar disorder, current episode mixed, severe, without psychotic features (H)        Mild major depression (H)        Anxiety        Encounter for surveillance of injectable contraceptive        Chronic bilateral low back pain with left-sided sciatica        Nausea          Care Instructions    - Increase Abilify to 10 mg     - Call for appointment with counseling and psychiatry      [facility]  [phone]      - Pain medication     Oxycodone 10 mg, max 8/day     - Recheck 2 weeks           Follow-ups after your visit        Additional Services     CARE COORDINATION REFERRAL       Services are provided by a Care Coordinator for people with complex needs such as: medical, social, or financial troubles.  The Care Coordinator works with the patient and their Primary Care Provider to determine health goals, obtain resources, achieve outcomes, and develop care plans that help coordinate the patient's care.     Reason for Referral: Other: No license, needs rides to counseling (patient is deaf)     Provide additional details for Care Coordination to best meet the patient's current needs:     Clinical Staff have discussed the Care Coordination Referral with the patient and/or caregiver: yes                  Who to contact     If you have questions or need follow up information about today's clinic visit or your schedule please contact [facility] directly at [phone].  Normal or non-critical lab and imaging results will be communicated to you by MyChart, letter or phone within 4  "business days after the clinic has received the results. If you do not hear from us within 7 days, please contact the clinic through [email or web address] or phone. If you have a critical or abnormal lab result, we will notify you by phone as soon as possible.  Submit refill requests through [email or web address] or call your pharmacy and they will forward the refill request to us. Please allow 3 business days for your refill to be completed.          Additional Information About Your Visit        Care.com Information     Care.com gives you secure access to your electronic health record. If you see a primary care provider, you can also send messages to your care team and make appointments. If you have questions, please call your primary care clinic.  If you do not have a primary care provider, please call [phone] and they will assist you.        Care EveryWhere ID     This is your Care EveryWhere ID. This could be used by other organizations to access your [place] medical records  [id number]        Your Vitals Were     Pulse Temperature Respirations Height Pulse Oximetry BMI (Body Mass Index)    84 98.8  F (37.1  C) (Oral) 12 5' 1.42\" (1.56 m) 99% 27.4 kg/m2       Blood Pressure from Last 3 Encounters:   01/15/18 124/88   12/19/17 105/70   12/05/17 116/84    Weight from Last 3 Encounters:   01/15/18 147 lb (66.7 kg)   12/21/17 155 lb (70.3 kg)   12/19/17 155 lb 9.6 oz (70.6 kg)              We Performed the Following     Beta HCG qual IFA urine - [facility] COORDINATION REFERRAL          Today's Medication Changes          These changes are accurate as of: 1/15/18  3:12 PM.  If you have any questions, ask your nurse or doctor.               Start taking these medicines.        Dose/Directions    oxyCODONE IR 10 MG tablet   Commonly known as:  ROXICODONE   Used for:  Chronic bilateral low back pain with left-sided sciatica   Started by:  Aura Rosario PA-C        Dose:  10 mg   Take 1 tablet (10 mg) by " mouth every 4 hours as needed for moderate to severe pain (Max 8/day)   Quantity:  112 tablet   Refills:  0         These medicines have changed or have updated prescriptions.        Dose/Directions    ARIPiprazole 10 MG tablet   Commonly known as:  ABILIFY   This may have changed:    - medication strength  - how much to take   Used for:  Adjustment disorder with mixed anxiety and depressed mood, Anxiety   Changed by:  Aura Rosario PA-C        Dose:  10 mg   Take 1 tablet (10 mg) by mouth At Bedtime   Quantity:  30 tablet   Refills:  1            Where to get your medicines      These medications were sent to [facility] [address] [place] [address] [address] [facility] [address]  [address] [facility] [address]     Phone:  [phone]     ARIPiprazole 10 MG tablet    DULoxetine 60 MG EC capsule    gabapentin 300 MG capsule    promethazine 6.25 MG/5ML syrup         Some of these will need a paper prescription and others can be bought over the counter.  Ask your nurse if you have questions.     Bring a paper prescription for each of these medications     oxyCODONE IR 10 MG tablet                Primary Care Provider Office Phone # Fax #    Aura Rosario PA-C [phone]       [address] [place] [address] [address]  [facility] [address]        Equal Access to Services     VIKY VILLANUEVA : Hadii pilo ku hadasho Soomaali, waaxda luqadaha, qaybta kaalmada adeegyada, susan turner. So [facility] [phone].    ATENCIÓN: Si habla español, tiene a kerr disposición servicios gratuitos de asistencia lingüística. Casper al [phone].    We comply with applicable federal civil rights laws and [place] laws. We do not discriminate on the basis of race, color, national origin, age, disability, sex, sexual orientation, or gender identity.            Thank you!     Thank you for choosing [facility]  for your care. Our  goal is always to provide you with excellent care. Hearing back from our patients is one way we can continue to improve our services. Please take a few minutes to complete the written survey that you may receive in the mail after your visit with us. Thank you!             Your Updated Medication List - Protect others around you: Learn how to safely use, store and throw away your medicines at www.disposemymeds.org.          This list is accurate as of: 1/15/18  3:12 PM.  Always use your most recent med list.                   Brand Name Dispense Instructions for use Diagnosis    ARIPiprazole 10 MG tablet    ABILIFY    30 tablet    Take 1 tablet (10 mg) by mouth At Bedtime    Adjustment disorder with mixed anxiety and depressed mood, Anxiety       cetirizine 10 MG tablet    zyrTEC     Take 10 mg by mouth daily        cyclobenzaprine 10 MG tablet    FLEXERIL    60 tablet    Take 2 tablets (20 mg) by mouth nightly as needed for muscle spasms or other (back pain)    Chronic bilateral low back pain with left-sided sciatica       dexlansoprazole 60 MG Cpdr CR capsule    DEXILANT    30 capsule    Take 1 capsule (60 mg) by mouth daily    Gastroesophageal reflux disease without esophagitis       diazepam 5 MG tablet    VALIUM    60 tablet    Take 0.5-1 tablets (2.5-5 mg) by mouth every 12 hours as needed for anxiety or sleep (MUSCLE SPASM) (one month supply)    Chronic pain syndrome, Chronic bilateral low back pain with left-sided sciatica, Lumbar disc disease with radiculopathy, S/P lumbar fusion       dicyclomine 20 MG tablet    BENTYL    30 tablet    Take 1 tablet (20 mg) by mouth 4 times daily as needed (ABDOMINAL CRAMPING) AS NEEDED FOR CRAMPING    Hx of Crohn's disease       DULoxetine 60 MG EC capsule    CYMBALTA    90 capsule    Take 1 capsule (60 mg) by mouth daily    Adjustment disorder with mixed anxiety and depressed mood       EPINEPHrine 0.3 MG/0.3ML injection 2-pack    EPIPEN/ADRENACLICK/or ANY BX GENERIC EQUIV     0.6 mL    Inject 0.3 mLs (0.3 mg) into the muscle once as needed for anaphylaxis    Bee sting allergy       gabapentin 300 MG capsule    NEURONTIN    90 capsule    Take 1 capsule (300 mg) by mouth 3 times daily    Chronic bilateral low back pain with left-sided sciatica       meclizine 12.5 MG tablet    ANTIVERT    30 tablet    Take 1 tablet (12.5 mg) by mouth 4 times daily as needed for dizziness    Dizziness       medical cannabis inhalation (Patient's own supply.  Not a prescription)      Inhale into the lungs 3 times daily as needed Reported on 3/28/2017        medroxyPROGESTERone 150 MG/ML injection    DEPO-PROVERA    3 mL    Inject 1 mL (150 mg) into the muscle every 3 months    Encounter for surveillance of injectable contraceptive, Dysmenorrhea       naloxone nasal spray    NARCAN    0.2 mL    Spray 1 spray (4 mg) into one nostril alternating nostrils as needed for opioid reversal every 2-3 minutes until assistance arrives    Chronic pain syndrome, Chronic bilateral low back pain with left-sided sciatica, S/P lumbar fusion, Lumbar disc disease with radiculopathy       NONFORMULARY      Apply 30 mLs topically 4 times daily        oxyCODONE IR 10 MG tablet    ROXICODONE    112 tablet    Take 1 tablet (10 mg) by mouth every 4 hours as needed for moderate to severe pain (Max 8/day)    Chronic bilateral low back pain with left-sided sciatica       polyethylene glycol Packet    MIRALAX/GLYCOLAX     Take 1 packet by mouth daily        promethazine 6.25 MG/5ML syrup    PHENERGAN    560 mL    Take 10 mLs (12.5 mg) by mouth 4 times daily as needed for nausea    Nausea       rizatriptan 5 MG ODT tab    MAXALT-MLT    18 tablet    Take 1-2 tablets (5-10 mg) by mouth at onset of headache for migraine May repeat in 2 hours. Max 6 tablets/24 hours.    Migraine with aura and without status migrainosus, not intractable

## 2018-01-15 NOTE — PROGRESS NOTES
Clinic Care Coordination Contact--Social Work Initial Call/Assessment  Care Team Conversations    Psychosocial: Care team referral for transportation needs for Patient.  Patient is deaf and uses relay for communication.  Call to Patient for further discussion.  It appears Patient [employer] which generally allows for a .  Patient also has Medical Assistance, which often provides medical transport.  Call to Patient with relay service ([phone]).  Patient reports that she lives in [place] and most of her appointments are in [facility].  SW informed of her clinic role and that she is assisting other SW in her absence.  SW offered to research this need for Patient, she accepted.  SW provided her contact information to Patient, however, Patient stated she did not have a pen to write this down.  SW ended call.  SW attempted to call [employer] [place] to clarify  role and benefits ([phone]), however this was closed.  Will attempt this week.      Per chart review, Patient seen by PCP today for depression and anxiety follow up, which she reported has worsened.  Patient reported having filed a restraining order on [facility] stating he was coming into her room.  She reported scheduled court date next week and being uncertain where she will live.  Patient reported feeling lonely, does not have transport to counseling appointments and is angry.  She reported desire for a pregnancy test stating she had unprotected sex on New Year's Alejandrina.  Patient is currently taking Abilify and Cymbalta. Diagnoses of mild major depression and Bipolar Disorder.  Will assess this further on next call.    Plan: Care Coordinator mailed out care coordination introduction letter today. Care Coordinator will try to reach patient again in 3-5 business days.    DOYLE Msoley, MSW   [phone]  1/15/2018 5:37 PM

## 2018-01-15 NOTE — NURSING NOTE
"Chief Complaint   Patient presents with     RECHECK     Panel Management       Initial /88 (BP Location: Right arm, Patient Position: Chair, Cuff Size: Adult Regular)  Pulse 84  Temp 98.8  F (37.1  C) (Oral)  Resp 12  Ht 5' 1.42\" (1.56 m)  Wt 147 lb (66.7 kg)  SpO2 99%  BMI 27.4 kg/m2 Estimated body mass index is 27.4 kg/(m^2) as calculated from the following:    Height as of this encounter: 5' 1.42\" (1.56 m).    Weight as of this encounter: 147 lb (66.7 kg).  Medication Reconciliation: complete  "

## 2018-01-15 NOTE — PATIENT INSTRUCTIONS
- Increase Abilify to 10 mg     - Call for appointment with counseling and psychiatry      [facility]  [phone]      - Pain medication     Oxycodone 10 mg, max 8/day     - Recheck 2 weeks

## 2018-01-15 NOTE — PROGRESS NOTES
SUBJECTIVE:                                                    Katy Islas is a 27 year old female who presents to clinic today for the following health issues:    Pt would like pregnancy test  - had unprotected sex on new years and is now having sx's of vomiting.   - Wants to go off Depo       History of Present Illness     Depression & Anxiety Follow-up:     Depression/Anxiety:  Depression & Anxiety    Status since last visit::  Worsened    Other associated symptoms of depression and anxiety::  YES    Significant life event::  YES    Current substance use::  None    - Filed restraining order against landlord, was coming into her bedroom    - Mom is here in lobby, leaves and visits and just hard each time, wishes she would stay in [place]     - Court next week   - Doesn't know where she is going to live   - Feels lonely   - Can't get to counselor   - Anger   - Cymbalta & Abilify           PHQ-9 SCORE 10/24/2017 12/5/2017 1/15/2018   Total Score - - -   Total Score MyChart 3 (Minimal depression) 4 (Minimal depression) 20 (Severe depression)   Total Score 3 4 20     JULIETH-7 SCORE 10/24/2017 12/5/2017 1/15/2018   Total Score - - -   Total Score 17 (severe anxiety) 4 (minimal anxiety) 21 (severe anxiety)   Total Score 17 4 21     Answers for HPI/ROS submitted by the patient on 1/15/2018   If you checked off any problems, how difficult have these problems made it for you to do your work, take care of things at home, or get along with other people?: Very difficult  PHQ9 TOTAL SCORE: 20  JULIETH 7 TOTAL SCORE: 21    Back Pain Follow Up     would like to discuss pain medication taper      Description:   Location of pain:  Lower back pain, middle  Character of pain: sore  Pain radiation: Does not radiate  Since last visit, pain is:  improved  New numbness or weakness in legs, not attributed to pain:  no     Intensity: Currently 4/10    History:   Pain interferes with job: Not applicable  Therapies tried without relief:  "none  Therapies tried with relief: rest , donut pillow  - Feeling a lot better   - Riding horses again  - Tailbone still a little sore   - Feels surgeon is tapering her fast, doesn't want to feel withdrawal   - 1 Vicodin every 8 hours, having a hard time sleeping, about 6       Gave her #60   - Started on Friday   - Previously 20 mg oxycodone per day, 1 every 4 hours, 6-8              - All done with Dr. Millan, no follow up needed  - Vicodin causing nausea and throws up 30 min later   - Stopped the MS contin   - Still one valium at night to sleep, not daily, just when spasms   - Gabapentin & Flexeril - using daily             Problem list and histories reviewed & adjusted, as indicated.  Additional history: as documented    Labs reviewed in EPIC    ROS:  Constitutional, HEENT, cardiovascular, pulmonary, gi and gu systems are negative, except as otherwise noted.      OBJECTIVE:   /88 (BP Location: Right arm, Patient Position: Chair, Cuff Size: Adult Regular)  Pulse 84  Temp 98.8  F (37.1  C) (Oral)  Resp 12  Ht 5' 1.42\" (1.56 m)  Wt 147 lb (66.7 kg)  SpO2 99%  BMI 27.4 kg/m2  Body mass index is 27.4 kg/(m^2).  GENERAL APPEARANCE: healthy, alert and no distress  EYES: Eyes grossly normal to inspection, PERRLA, conjunctivae and sclerae without injection or discharge, EOM intact   MS: No musculoskeletal defects are noted and gait is age appropriate without ataxia   SKIN: No suspicious lesions or rashes, hydration status appears adeuqate with normal skin turgor   NEURO: Deferred   BACK: Deferred   PSYCH: Alert and oriented x3; speech- coherent , normal rate and volume; able to articulate logical thoughts, able to abstract reason, no tangential thoughts, no hallucinations or delusions, mentation appears normal, Mood is depressed and tearful. Affect is appropriate but anxious at times. Thought content is free of suicidal ideation, hallucinations, and delusions. Dress is adequate and upkept. Eye contact is " good during conversation.       Diagnostic Test Results:  Results for orders placed or performed in visit on 01/15/18   Beta HCG qual IFA urine - [facility] [place]   Result Value Ref Range    Beta HCG Qual IFA Urine Negative NEG^Negative        *Note: Due to a large number of results and/or encounters for the requested time period, some results have not been displayed. A complete set of results can be found in Results Review.       ASSESSMENT/PLAN:       ICD-10-CM    1. Adjustment disorder with mixed anxiety and depressed mood F43.23 CARE COORDINATION REFERRAL     DULoxetine (CYMBALTA) 60 MG EC capsule     ARIPiprazole (ABILIFY) 10 MG tablet   2. Bipolar disorder, current episode mixed, severe, without psychotic features (H) F31.63 CARE COORDINATION REFERRAL   3. Mild major depression (H) F32.0 CARE COORDINATION REFERRAL   4. Anxiety F41.9 CARE COORDINATION REFERRAL     ARIPiprazole (ABILIFY) 10 MG tablet   5. Encounter for surveillance of injectable contraceptive Z30.42    6. Chronic bilateral low back pain with left-sided sciatica M54.42 gabapentin (NEURONTIN) 300 MG capsule    G89.29 oxyCODONE IR (ROXICODONE) 10 MG tablet   7. Nausea R11.0 promethazine (PHENERGAN) 6.25 MG/5ML syrup       1-4. Mood   - Continue Cymbalta   - Increase Abilify to 10 mg (from 5 mg), reviewed use and side effects   - Encouraged again, counseling and psychiatry   - Gave number for [facility] health in [place] as this is where patient lives now   - Advised if symptoms worsen, should go to ED or call 911 (especially if having SI thoughts which patient denies today)   - Recheck every 2 weeks     5. Depo   - Patient wishes to stop, reviewed maybe should get her mood under control prior to trying to get pregnant with a boy that is just a friend   - Also discussed risks of unprotected sex including STDs   - Urine screen as above     6 & 7. Back pain   -  reviewed      Last fills from Dr. Millan her surgeon      1/12/18 - Floweree  7.5-325 mg, #60      1/3/18 - Oxycodone 20 mg, #90   - Does appear she was taken from total daily dosing of Oxycodone (20 mg tablets) 120 mg- 160 mg/day  ( - 240) to Norco (7.5 mg tablets) 45 mg/day (MME 45), with patient now having nausea and vomiting on Norco along with some withdrawal symptoms   - Will have her destroy Norco, do not use, do not mix with other medications   - Will switching to Oxycodone 10 mg, recommend 6-8 per day, 60-80 mg/day, MME    - Reviewed use and side effects including sedation and addiction, patient doesn't drive   - Will recheck every 2 weeks and continue to taper down by 10-20 mg per day per 2 week interval   - Patient also encouraged to cut back where able     - Promethazine refilled for nausea, reviewed use and side effects     The patient indicates understanding of these issues and agrees with the plan.    Follow up: 2 weeks     Due to language barrier, an  was present during the history-taking and subsequent discussion (and for part of the physical exam) with this patient.        Aura Rosario PA-C  [facility]

## 2018-01-15 NOTE — LETTER
Health Care Home - Access Care Plan    About Me  Patient Name:  Katy Islas    YOB: 1990  Age:                             27 year old   Joseph MRN:            [id number] Telephone Information:     Home Phone [phone]   Mobile [phone]       Address:    [address] [place] [address] Email address:  [email or web address]      Emergency Contact(s)  Name Relationship Lgl Grd Work Phone Home Phone Mobile Phone   1. JOSEPH ISLAS Mother No none [phone]             Health Maintenance: Routine Health maintenance Reviewed: Not assessed    My Access Plan  Medical Emergency 911   Questions or concerns during clinic hours Primary Clinic Line, I will call the clinic directly: Primary Clinic: [facility]- [phone]   24 Hour Appointment Line [phone] or  [phone]) (toll free)   24 Hour Nurse Line [phone] (toll free)   Questions or concerns outside clinic hours 24 Hour Appointment Line, I will call the after-hours on-call line:   [facility] [phone] or [phone] ([phone]) (toll-free)   Preferred Urgent Care Preferred Urgent Care:  (unknown)   Preferred Hospital Preferred Hospital:  (unknown)   Preferred Pharmacy [id number] [address]     Behavioral Health Crisis Line The National Suicide Prevention Lifeline at 1-904.797.3515 or 553     My Care Team Members  Patient Care Team       Relationship Specialty Notifications Start End    Davi-Aura Moss PA-C PCP - General Physician Assistant - Medical  9/19/16     Phone: [phone] Fax: [phone]         [address] [facility] [address] [facility] [address]    Natanael Guaman MD   Internal Medicine  8/25/11     Phone: [phone] Fax: [phone]         XXX RESIGNED  [address] [facility] [address]    [facility] Care Coordinator  - Clinical  1/15/18      Phone: [phone] Fax: [phone]            My Medical and Care Information  Problem List   Patient Active Problem List   Diagnosis     Hearing loss     Allergic rhinitis     Migraine     Benign neoplasm of skin of lower limb, including hip     Dysmenorrhea     Crohn's colitis (H)     Mild major depression (H)     Anxiety     Chronic pain     Oral thrush     Nausea and vomiting     Abdominal pain     Grand mal seizure disorder (H)     Bipolar disorder (H)     Marijuana abuse     Adjustment disorder with mixed anxiety and depressed mood     Herpes simplex virus infection     Gastroesophageal reflux disease without esophagitis     Bee sting allergy     Mild persistent asthma without complication     Chronic bilateral low back pain with left-sided sciatica     Vitamin D deficiency     Atypical mole     Lumbar disc disease with radiculopathy     S/P lumbar fusion     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)     Upper GI bleed - suspected     Tobacco abuse     History of pseudoseizure     Closed fracture of coccyx with nonunion, subsequent encounter     Postoperative pain     Iliotibial band syndrome affecting lower leg, right     Chondromalacia of right patellofemoral joint

## 2018-01-16 ASSESSMENT — ANXIETY QUESTIONNAIRES: GAD7 TOTAL SCORE: 21

## 2018-01-16 ASSESSMENT — PATIENT HEALTH QUESTIONNAIRE - PHQ9: SUM OF ALL RESPONSES TO PHQ QUESTIONS 1-9: 20

## 2018-01-16 NOTE — PROGRESS NOTES
Reason for Call:  call back    Detailed comments: Patient is returning your call. She state it is fine to call her back. She is letting you know she has  counseling appointment this Friday at 12:30 in [facility] and lives in [place].    Phone Number Patient can be reached at: Home number on file [phone] (home)    Best Time: anytime today    Can we leave a detailed message on this number? YES    Call taken on 1/16/2018 at 10:27 AM by Michelle Weeks

## 2018-01-18 ENCOUNTER — TRANSFERRED RECORDS (OUTPATIENT)
Dept: HEALTH INFORMATION MANAGEMENT | Facility: CLINIC | Age: 28
End: 2018-01-18

## 2018-01-22 NOTE — PROGRESS NOTES
"  SUBJECTIVE:                                                    Katy Islas is a 27 year old female who presents to clinic today for the following health issues:      History of Present Illness     Depression & Anxiety Follow-up:     Depression/Anxiety:  Depression & Anxiety    Status since last visit::  Improved    Other associated symptoms of depression and anxiety::  YES    Significant life event::  YES    Current substance use::  Cannabis       Today's PHQ-9         PHQ-9 Total Score:     (P) 6   PHQ-9 Q9 Suicidal ideation:   (P) Not at all   Thoughts of suicide or self harm:      Self-harm Plan:        Self-harm Action:          Safety concerns for self or others:       JULIETH-7 Total Score: (P) 21      Depression and Anxiety Follow-Up    Status since last visit: Improved \"A LOT better\"    Other associated symptoms: restless     Complicating factors: no    Significant life event: Yes-  \"Landlord wants to kick me out, I dont know where I will be living, mom is moving to [place]\"     Current substance abuse: None    - Court postponed, now tomorrow   - In march will get mobile home   - Maybe move to [place] with mom     PHQ-9 10/24/2017 12/5/2017 1/15/2018   Total Score 3 4 20   Q9: Suicide Ideation Not at all Not at all Nearly every day     JULIETH-7 SCORE 10/24/2017 12/5/2017 1/15/2018   Total Score - - -   Total Score 17 (severe anxiety) 4 (minimal anxiety) 21 (severe anxiety)   Total Score 17 4 21     In the past two weeks have you had thoughts of suicide or self-harm?  No.    Do you have concerns about your personal safety or the safety of others?   No      Chronic Pain Follow-Up       Type / Location of Pain: Back, more this week than normal, maybe from decreasing dosage of medication   Analgesia/pain control:       Recent changes:  worse      Overall control: Tolerable with discomfort  Activity level/function:      Daily activities:  Depends on the day, yesterday was okay    Work:  not applicable  Adverse " effects:  No  Adherance    Taking medication as directed?  Yes    Participating in other treatments: yes, medical marijuana   Risk Factors:    Sleep:  Fair    Mood/anxiety:  controlled    Recent family or social stressors:  Mom and where I am living    Other aggravating factors: prolonged sitting and prolonged standing, lifting    - Little bit worse   - Pool therapy starting today   - Oxycodone 8/day   - 1 tablet at night   - Restless during the day       ED/UC Followup:    Facility:  [facility]  Date of visit: 1/25/18  Reason for visit:  Crohn's flare   Current Status: Improving     - Feeling better   - Told to make appointment with her GI doctor to discuss further treatment for her Crohn's flares          PHQ-9 SCORE 10/24/2017 12/5/2017 1/15/2018   Total Score - - -   Total Score MyChart 3 (Minimal depression) 4 (Minimal depression) 20 (Severe depression)   Total Score 3 4 20     JULIETH-7 SCORE 10/24/2017 12/5/2017 1/15/2018   Total Score - - -   Total Score 17 (severe anxiety) 4 (minimal anxiety) 21 (severe anxiety)   Total Score 17 4 21     Encounter-Level CSA - 04/25/2017:          Controlled Substance Agreement - Scan on 5/1/2017 11:06 AM : CONTROLLED SUBSTANCE AGREEMENT (below)            Encounter-Level CSA - 04/25/2017:          Controlled Substance Agreement - Scan on 1/26/2015  9:14 AM : Controlled Medication Agreement-01/12/15 (below)                Plan from last visit 1/15/18  -  reviewed      Last fills from Dr. Millan her surgeon      1/12/18 - Norco 7.5-325 mg, #60      1/3/18 - Oxycodone 20 mg, #90   - Does appear she was taken from total daily dosing of Oxycodone (20 mg tablets) 120 mg- 160 mg/day  ( - 240) to Norco (7.5 mg tablets) 45 mg/day (MME 45), with patient now having nausea and vomiting on Norco along with some withdrawal symptoms   - Will have her destroy Norco, do not use, do not mix with other medications   - Will switching to Oxycodone 10 mg, recommend 6-8 per day, 60-80  mg/day, MME    - Reviewed use and side effects including sedation and addiction, patient doesn't drive   - Will recheck every 2 weeks and continue to taper down by 10-20 mg per day per 2 week interval   - Patient also encouraged to cut back where able         Problem list and histories reviewed & adjusted, as indicated.  Additional history: as documented      Labs reviewed in EPIC    ROS:  Constitutional, HEENT, cardiovascular, pulmonary, gi and gu systems are negative, except as otherwise noted.    OBJECTIVE:   /78  Pulse 82  Temp 98.7  F (37.1  C) (Oral)  Resp 14  Wt 148 lb 3.2 oz (67.2 kg)  SpO2 100%  BMI 27.62 kg/m2  Body mass index is 27.62 kg/(m^2).  GENERAL APPEARANCE: healthy, alert and no distress  EYES: Eyes grossly normal to inspection, PERRLA, conjunctivae and sclerae without injection or discharge, EOM intact   MS: No musculoskeletal defects are noted and gait is age appropriate without ataxia   SKIN: No suspicious lesions or rashes, hydration status appears adeuqate with normal skin turgor   BACK: Deferred   PSYCH: Alert and oriented x3; speech- coherent , normal rate and volume; able to articulate logical thoughts, able to abstract reason, no tangential thoughts, no hallucinations or delusions, mentation appears normal, Mood is euthymic. Affect is appropriate for this mood state and bright. Thought content is free of suicidal ideation, hallucinations, and delusions. Dress is adequate and upkept. Eye contact is good during conversation.       Diagnostic Test Results:  none     ASSESSMENT/PLAN:       ICD-10-CM    1. Chronic bilateral low back pain with left-sided sciatica M54.42 oxyCODONE IR (ROXICODONE) 10 MG tablet    G89.29 diazepam (VALIUM) 5 MG tablet   2. Chronic pain syndrome G89.4 diazepam (VALIUM) 5 MG tablet   3. Lumbar disc disease with radiculopathy M51.16 diazepam (VALIUM) 5 MG tablet   4. S/P lumbar fusion Z98.1 diazepam (VALIUM) 5 MG tablet   5. Crohn's disease of colon  with other complication (H) K50.118    6. Mild major depression (H) F32.0    7. Bipolar disorder, current episode mixed, severe, without psychotic features (H) F31.63    8. Anxiety F41.9    9. Migraine with aura and without status migrainosus, not intractable G43.109 rizatriptan (MAXALT-MLT) 5 MG ODT tab     1-4. Pain   -  reviewed - as expected      Last fills from Dr. Millan her surgeon      1/12/18 - Norco 7.5-325 mg, #60      1/3/18 - Oxycodone 20 mg, #90      Rest from myself   - Oxycodone 10 mg, recommend 8 per day, 80 mg/day,    - Reviewed use and side effects including sedation and addiction, patient doesn't drive   - Will pause taper as patient is starting physical therapy today,  Will continue to recheck every 2 weeks and eventually continue to taper down by 10-20 mg per day per 2 week interval    - Patient also encouraged to cut back where able   - Continue with plans for pool therapy  - Reviewed use and side effects of Valium as well, will allow small increase to 10 mg at night     5. Crohn's   - Reviewed ED note   - Advised follow up with her GI specialist to see if anything further can be done     7-9. Mood  - Improving on Abilify 10 mg at night, has been 2 weeks   - Continue on rest of medications as prescribed   - Encouraged again appointment with psychiatry and counseling      [facility] in [place] is not taking new patients     Patient did not get my message about [facility] in [place], [place], or [place]     Gave her contact information and website to call     The patient indicates understanding of these issues and agrees with the plan.    Follow up: 2 weeks       Due to language barrier, an  was present during the history-taking and subsequent discussion (and for part of the physical exam) with this patient.        Aura Tomlinson-LOVE Moss  [facility]

## 2018-01-24 ENCOUNTER — TELEPHONE (OUTPATIENT)
Dept: FAMILY MEDICINE | Facility: OTHER | Age: 28
End: 2018-01-24

## 2018-01-24 DIAGNOSIS — F31.63 BIPOLAR DISORDER, CURRENT EPISODE MIXED, SEVERE, WITHOUT PSYCHOTIC FEATURES (H): Primary | ICD-10-CM

## 2018-01-24 DIAGNOSIS — F41.9 ANXIETY: ICD-10-CM

## 2018-01-24 DIAGNOSIS — F32.0 MILD MAJOR DEPRESSION (H): ICD-10-CM

## 2018-01-24 NOTE — TELEPHONE ENCOUNTER
Reason for Call: Request for an order or referral:    Order or referral being requested: referral    Date needed: as soon as possible    Has the patient been seen by the PCP for this problem? YES    Additional comments: Patient was not able to get in [place] for mental health and they suggestion [facility]. Could you please do another referral to them.      Phone number Patient can be reached at:  Home number on file [phone] (home)    Best Time:  any    Can we leave a detailed message on this number?  YES    Call taken on 1/24/2018 at 10:00 AM by Maya Sawyer

## 2018-01-25 ENCOUNTER — MYC MEDICAL ADVICE (OUTPATIENT)
Dept: FAMILY MEDICINE | Facility: OTHER | Age: 28
End: 2018-01-25

## 2018-01-25 ENCOUNTER — HOSPITAL ENCOUNTER (EMERGENCY)
Facility: CLINIC | Age: 28
Discharge: HOME | End: 2018-01-25
Attending: FAMILY MEDICINE | Admitting: FAMILY MEDICINE
Payer: MEDICARE

## 2018-01-25 ENCOUNTER — TELEPHONE (OUTPATIENT)
Dept: FAMILY MEDICINE | Facility: OTHER | Age: 28
End: 2018-01-25

## 2018-01-25 VITALS
WEIGHT: 147.05 LBS | OXYGEN SATURATION: 94 % | DIASTOLIC BLOOD PRESSURE: 83 MMHG | RESPIRATION RATE: 14 BRPM | SYSTOLIC BLOOD PRESSURE: 119 MMHG | TEMPERATURE: 97.5 F | BODY MASS INDEX: 27.41 KG/M2

## 2018-01-25 DIAGNOSIS — R19.7 DIARRHEA, UNSPECIFIED TYPE: ICD-10-CM

## 2018-01-25 DIAGNOSIS — R10.84 ABDOMINAL PAIN, GENERALIZED: ICD-10-CM

## 2018-01-25 DIAGNOSIS — R11.2 NAUSEA AND VOMITING, INTRACTABILITY OF VOMITING NOT SPECIFIED, UNSPECIFIED VOMITING TYPE: ICD-10-CM

## 2018-01-25 LAB
ALBUMIN SERPL-MCNC: 4 G/DL (ref 3.4–5)
ALP SERPL-CCNC: 106 U/L (ref 40–150)
ALT SERPL W P-5'-P-CCNC: 28 U/L (ref 0–50)
ANION GAP SERPL CALCULATED.3IONS-SCNC: 5 MMOL/L (ref 3–14)
AST SERPL W P-5'-P-CCNC: 9 U/L (ref 0–45)
BASOPHILS # BLD AUTO: 0 10E9/L (ref 0–0.2)
BASOPHILS NFR BLD AUTO: 0.9 %
BILIRUB SERPL-MCNC: 0.6 MG/DL (ref 0.2–1.3)
BUN SERPL-MCNC: 18 MG/DL (ref 7–30)
CALCIUM SERPL-MCNC: 9.3 MG/DL (ref 8.5–10.1)
CHLORIDE SERPL-SCNC: 108 MMOL/L (ref 94–109)
CO2 SERPL-SCNC: 28 MMOL/L (ref 20–32)
CREAT SERPL-MCNC: 0.77 MG/DL (ref 0.52–1.04)
DIFFERENTIAL METHOD BLD: ABNORMAL
EOSINOPHIL # BLD AUTO: 0 10E9/L (ref 0–0.7)
EOSINOPHIL NFR BLD AUTO: 0.6 %
ERYTHROCYTE [DISTWIDTH] IN BLOOD BY AUTOMATED COUNT: 12.7 % (ref 10–15)
GFR SERPL CREATININE-BSD FRML MDRD: 89 ML/MIN/1.7M2
GLUCOSE SERPL-MCNC: 85 MG/DL (ref 70–99)
HCT VFR BLD AUTO: 43.5 % (ref 35–47)
HGB BLD-MCNC: 14.6 G/DL (ref 11.7–15.7)
IMM GRANULOCYTES # BLD: 0 10E9/L (ref 0–0.4)
IMM GRANULOCYTES NFR BLD: 0 %
LYMPHOCYTES # BLD AUTO: 1.1 10E9/L (ref 0.8–5.3)
LYMPHOCYTES NFR BLD AUTO: 30.9 %
MCH RBC QN AUTO: 32 PG (ref 26.5–33)
MCHC RBC AUTO-ENTMCNC: 33.6 G/DL (ref 31.5–36.5)
MCV RBC AUTO: 95 FL (ref 78–100)
MONOCYTES # BLD AUTO: 0.4 10E9/L (ref 0–1.3)
MONOCYTES NFR BLD AUTO: 10.4 %
NEUTROPHILS # BLD AUTO: 2 10E9/L (ref 1.6–8.3)
NEUTROPHILS NFR BLD AUTO: 57.2 %
PLATELET # BLD AUTO: 299 10E9/L (ref 150–450)
POTASSIUM SERPL-SCNC: 3.9 MMOL/L (ref 3.4–5.3)
PROT SERPL-MCNC: 8 G/DL (ref 6.8–8.8)
RBC # BLD AUTO: 4.56 10E12/L (ref 3.8–5.2)
SODIUM SERPL-SCNC: 141 MMOL/L (ref 133–144)
WBC # BLD AUTO: 3.5 10E9/L (ref 4–11)

## 2018-01-25 PROCEDURE — 96361 HYDRATE IV INFUSION ADD-ON: CPT | Performed by: FAMILY MEDICINE

## 2018-01-25 PROCEDURE — 25000128 H RX IP 250 OP 636: Performed by: FAMILY MEDICINE

## 2018-01-25 PROCEDURE — 96375 TX/PRO/DX INJ NEW DRUG ADDON: CPT | Performed by: FAMILY MEDICINE

## 2018-01-25 PROCEDURE — 96374 THER/PROPH/DIAG INJ IV PUSH: CPT | Performed by: FAMILY MEDICINE

## 2018-01-25 PROCEDURE — 96376 TX/PRO/DX INJ SAME DRUG ADON: CPT | Performed by: FAMILY MEDICINE

## 2018-01-25 PROCEDURE — 99285 EMERGENCY DEPT VISIT HI MDM: CPT | Mod: 25 | Performed by: FAMILY MEDICINE

## 2018-01-25 PROCEDURE — 80053 COMPREHEN METABOLIC PANEL: CPT | Performed by: FAMILY MEDICINE

## 2018-01-25 PROCEDURE — 99285 EMERGENCY DEPT VISIT HI MDM: CPT | Mod: Z6 | Performed by: FAMILY MEDICINE

## 2018-01-25 PROCEDURE — 85025 COMPLETE CBC W/AUTO DIFF WBC: CPT | Performed by: FAMILY MEDICINE

## 2018-01-25 RX ORDER — PROMETHAZINE HYDROCHLORIDE 25 MG/ML
25 INJECTION, SOLUTION INTRAMUSCULAR; INTRAVENOUS ONCE
Status: COMPLETED | OUTPATIENT
Start: 2018-01-25 | End: 2018-01-25

## 2018-01-25 RX ORDER — PREDNISONE 20 MG/1
TABLET ORAL
Qty: 10 TABLET | Refills: 0 | Status: SHIPPED | OUTPATIENT
Start: 2018-01-25 | End: 2018-01-25

## 2018-01-25 RX ORDER — PROMETHAZINE HYDROCHLORIDE 25 MG/ML
12.5 INJECTION, SOLUTION INTRAMUSCULAR; INTRAVENOUS ONCE
Status: COMPLETED | OUTPATIENT
Start: 2018-01-25 | End: 2018-01-25

## 2018-01-25 RX ORDER — SODIUM CHLORIDE 9 MG/ML
1000 INJECTION, SOLUTION INTRAVENOUS CONTINUOUS
Status: DISCONTINUED | OUTPATIENT
Start: 2018-01-25 | End: 2018-01-25 | Stop reason: HOSPADM

## 2018-01-25 RX ORDER — METHYLPREDNISOLONE 4 MG
TABLET, DOSE PACK ORAL
Qty: 21 TABLET | Refills: 0 | Status: SHIPPED | OUTPATIENT
Start: 2018-01-25 | End: 2018-02-08

## 2018-01-25 RX ADMIN — HYDROMORPHONE HYDROCHLORIDE 1 MG: 1 INJECTION, SOLUTION INTRAMUSCULAR; INTRAVENOUS; SUBCUTANEOUS at 13:18

## 2018-01-25 RX ADMIN — PROMETHAZINE HYDROCHLORIDE 25 MG: 25 INJECTION INTRAMUSCULAR; INTRAVENOUS at 12:33

## 2018-01-25 RX ADMIN — PROMETHAZINE HYDROCHLORIDE 12.5 MG: 25 INJECTION INTRAMUSCULAR; INTRAVENOUS at 13:13

## 2018-01-25 RX ADMIN — HYDROMORPHONE HYDROCHLORIDE 1 MG: 1 INJECTION, SOLUTION INTRAMUSCULAR; INTRAVENOUS; SUBCUTANEOUS at 12:37

## 2018-01-25 RX ADMIN — SODIUM CHLORIDE 1000 ML: 9 INJECTION, SOLUTION INTRAVENOUS at 12:32

## 2018-01-25 NOTE — TELEPHONE ENCOUNTER
Referral placed. Please notify patient as she will have to call to schedule  - Locations closest to her - [place], [place], [place]   - She can look up number, or we can look it up and give to her.       Zach Rosario PA-C  [facility] [place]

## 2018-01-25 NOTE — ED NOTES
"Walked with pt to lobby, so that she could get reception on her phone.  Once there PT refusing to come back to room.  PT states that she can not get a hold of her friends to get a ride.  When asked to let us try on our phone, she refused to give numbers.  PT asking to drive herself home \"she feels fine\".  Was told this is not an option.  Was told that the police would be called if she attempted to drive herself.  PT stated \"this is bullshit\"  And walked off into entry way. PT and vehicle watched by security.   "

## 2018-01-25 NOTE — TELEPHONE ENCOUNTER
Can't work in. Advise appointment early next week.    Zach Rosario PA-C  [facility] [place]

## 2018-01-25 NOTE — ED AVS SNAPSHOT
[facility] Emergency Department    [address]     [place] [address]    Phone:  [phone]    Fax:  [phone]                                       Katy Islas   MRN: [id number]    Department:  [facility] Emergency Department   Date of Visit:  1/25/2018           Patient Information     Date Of Birth          1990        Your diagnoses for this visit were:     Abdominal pain, generalized     Nausea and vomiting, intractability of vomiting not specified, unspecified vomiting type     Diarrhea, unspecified type        You were seen by Oswaldo Zamora MD.      Follow-up Information     Follow up with Aura Rosario PA-C. Call in 1 day.    Specialty:  Physician Assistant - Medical    Why:  For follow up on your ED stay    Contact information:    [address]  [facility] [address]  [phone]        Discharge References/Attachments     ABDOMINAL PAIN, UNKNOWN CAUSE, (FEMALE) (ENGLISH)    DIARRHEA, UNKNOWN CAUSE (ENGLISH)    PAIN RESPONSE, UNDERSTANDING (ENGLISH)      Future Appointments        Provider Department Dept Phone Center    1/29/2018 10:30 AM Aura Rosario PA-C; ASL IS [facility] [phone] [facility]      24 Hour Appointment Hotline       To make an appointment at any [facility], call [phone] ([phone]). If you don't have a family doctor or clinic, we will help you find one. [facility] are conveniently located to serve the needs of you and your family.          ED Discharge Orders     CLOSTRIDIUM DIFFICILE TOXIN B PCR           Clostridium difficile toxin B PCR           Enteric Bacteria and Virus Panel by BENJI Stool                    Review of your medicines      START taking        Dose / Directions Last dose taken    predniSONE 20 MG tablet   Commonly known as:  DELTASONE   Quantity:  10 tablet        Take two tablets (= 40mg) each day for 5 (five) days   Refills:  0           Our records show that you are taking the medicines listed below. If these are incorrect, please call your family doctor or clinic.        Dose / Directions Last dose taken    ARIPiprazole 10 MG tablet   Commonly known as:  ABILIFY   Dose:  10 mg   Quantity:  30 tablet        Take 1 tablet (10 mg) by mouth At Bedtime   Refills:  1        cetirizine 10 MG tablet   Commonly known as:  zyrTEC   Dose:  10 mg        Take 10 mg by mouth daily   Refills:  0        cyclobenzaprine 10 MG tablet   Commonly known as:  FLEXERIL   Dose:  20 mg   Quantity:  60 tablet        Take 2 tablets (20 mg) by mouth nightly as needed for muscle spasms or other (back pain)   Refills:  3        dexlansoprazole 60 MG Cpdr CR capsule   Commonly known as:  DEXILANT   Dose:  60 mg   Quantity:  30 capsule        Take 1 capsule (60 mg) by mouth daily   Refills:  3        diazepam 5 MG tablet   Commonly known as:  VALIUM   Dose:  2.5-5 mg   Quantity:  60 tablet        Take 0.5-1 tablets (2.5-5 mg) by mouth every 12 hours as needed for anxiety or sleep (MUSCLE SPASM) (one month supply)   Refills:  2        dicyclomine 20 MG tablet   Commonly known as:  BENTYL   Dose:  20 mg   Quantity:  30 tablet        Take 1 tablet (20 mg) by mouth 4 times daily as needed (ABDOMINAL CRAMPING) AS NEEDED FOR CRAMPING   Refills:  3        DULoxetine 60 MG EC capsule   Commonly known as:  CYMBALTA   Dose:  60 mg   Quantity:  90 capsule        Take 1 capsule (60 mg) by mouth daily   Refills:  3        EPINEPHrine 0.3 MG/0.3ML injection 2-pack   Commonly known as:  EPIPEN/ADRENACLICK/or ANY BX GENERIC EQUIV   Dose:  0.3 mg   Quantity:  0.6 mL        Inject 0.3 mLs (0.3 mg) into the muscle once as needed for anaphylaxis   Refills:  1        gabapentin 300 MG capsule   Commonly known as:  NEURONTIN   Dose:  300 mg   Quantity:  90 capsule        Take 1 capsule (300 mg) by mouth 3 times daily   Refills:  3        meclizine 12.5 MG tablet   Commonly known as:  ANTIVERT    Dose:  12.5 mg   Quantity:  30 tablet        Take 1 tablet (12.5 mg) by mouth 4 times daily as needed for dizziness   Refills:  1        medical cannabis inhalation (Patient's own supply.  Not a prescription)        Inhale into the lungs 3 times daily as needed Reported on 3/28/2017   Refills:  0        medroxyPROGESTERone 150 MG/ML injection   Commonly known as:  DEPO-PROVERA   Dose:  150 mg   Quantity:  3 mL        Inject 1 mL (150 mg) into the muscle every 3 months   Refills:  3        naloxone nasal spray   Commonly known as:  NARCAN   Dose:  4 mg   Quantity:  0.2 mL        Spray 1 spray (4 mg) into one nostril alternating nostrils as needed for opioid reversal every 2-3 minutes until assistance arrives   Refills:  0        NONFORMULARY   Dose:  30 mL   Indication:  Marina Topical Gel Cream, 1 to 4 pumps  to affect area 4 times daily        Apply 30 mLs topically 4 times daily   Refills:  0        oxyCODONE IR 10 MG tablet   Commonly known as:  ROXICODONE   Dose:  10 mg   Quantity:  112 tablet        Take 1 tablet (10 mg) by mouth every 4 hours as needed for moderate to severe pain (Max 8/day)   Refills:  0        polyethylene glycol Packet   Commonly known as:  MIRALAX/GLYCOLAX   Dose:  1 packet        Take 1 packet by mouth daily   Refills:  0        promethazine 6.25 MG/5ML syrup   Commonly known as:  PHENERGAN   Dose:  12.5 mg   Quantity:  560 mL        Take 10 mLs (12.5 mg) by mouth 4 times daily as needed for nausea   Refills:  3        rizatriptan 5 MG ODT tab   Commonly known as:  MAXALT-MLT   Dose:  5-10 mg   Quantity:  18 tablet        Take 1-2 tablets (5-10 mg) by mouth at onset of headache for migraine May repeat in 2 hours. Max 6 tablets/24 hours.   Refills:  3                Prescriptions were sent or printed at these locations (1 Prescription)                   [facility] [address] [place] [address] [address]   [address]  [place] [address]    Telephone:  [phone]   Fax:  [phone]   Hours:                  E-Prescribed (1 of 1)         predniSONE (DELTASONE) 20 MG tablet                Procedures and tests performed during your visit     CBC with platelets differential    Comprehensive metabolic panel    Peripheral IV catheter      Orders Needing Specimen Collection     None      Pending Results     No orders found from 1/23/2018 to 1/26/2018.            Pending Culture Results     No orders found from 1/23/2018 to 1/26/2018.            Pending Results Instructions     If you had any lab results that were not finalized at the time of your Discharge, you can call the ED Lab Result RN at [phone]. You will be contacted by this team for any positive Lab results or changes in treatment. The nurses are available 7 days a week from 10A to 6:30P.  You can leave a message 24 hours per day and they will return your call.        Thank you for choosing [facility]       Thank you for choosing [facility] for your care. Our goal is always to provide you with excellent care. Hearing back from our patients is one way we can continue to improve our services. Please take a few minutes to complete the written survey that you may receive in the mail after you visit with us. Thank you!        [employer] Information     [employer] gives you secure access to your electronic health record. If you see a primary care provider, you can also send messages to your care team and make appointments. If you have questions, please call your primary care clinic.  If you do not have a primary care provider, please call [phone] and they will assist you.        Care EveryWhere ID     This is your Care EveryWhere ID. This could be used by other organizations to access your [facility] medical records  [id number]        Equal Access to Services     VIKY VILLANUEVA AH: Ruth Chen, mayank kelly, susan encinas  la'yani yue. So [facility] [phone].    ATENCIÓN: Si habla español, tiene a kerr disposición servicios gratuitos de asistencia lingüística. Llame al [phone].    We comply with applicable federal civil rights laws and [place] laws. We do not discriminate on the basis of race, color, national origin, age, disability, sex, sexual orientation, or gender identity.            After Visit Summary       This is your record. Keep this with you and show to your community pharmacist(s) and doctor(s) at your next visit.

## 2018-01-25 NOTE — ED AVS SNAPSHOT
[facility] Emergency Department    [address] [place] [address]    Phone:  [phone]    Fax:  [phone]                                       Katy Islas   MRN: [id number]    Department:  [facility] Emergency Department   Date of Visit:  1/25/2018           After Visit Summary Signature Page     I have received my discharge instructions, and my questions have been answered. I have discussed any challenges I see with this plan with the nurse or doctor.    ..........................................................................................................................................  Patient/Patient Representative Signature      ..........................................................................................................................................  Patient Representative Print Name and Relationship to Patient    ..................................................               ................................................  Date                                            Time    ..........................................................................................................................................  Reviewed by Signature/Title    ...................................................              ..............................................  Date                                                            Time

## 2018-01-25 NOTE — ED NOTES
Writer went in to discharge patient and she had to be woken up.  Patient wants to talk with Provider regarding more pain meds.

## 2018-01-25 NOTE — TELEPHONE ENCOUNTER
Katy Islas is a 27 year old female who calls with abdominal pain.    NURSING ASSESSMENT:  Description:  I spoke with pt who states she is having bad stomach pains with vomiting and diarrhea. She is vomiting bile and the stool is watery and gel like.   Onset/duration:  today  Precip. factors:  History of stomach pain and N and V  Associated symptoms:  No fever  Improves/worsens symptoms:  Did not address  Pain scale (0-10)   6-7/10    Allergies:   Allergies   Allergen Reactions     Ativan [Lorazepam] Hives     At the IV site     Baclofen      hives     Bees Hives, Swelling and Difficulty breathing     Caffeine      Contrast Dye      Hives,   Updated 5/10/2016 CT Contrast.     Diazepam      IV form makes her become aggressive and angry     Iodine Hives     Methocarbamol Swelling     Metoclopramide      Other reaction(s): Tremors  LW Reaction: shaking/sweating     Midazolam      Other reaction(s): Agitation     Monosodium Glutamate      Morphine Other (See Comments)     Difficulty with urination     Nsaids Other (See Comments)     GI BLEED x2     Other (Do Not Use) Other (See Comments)     Xanaflex- pt becomes disoriented and loses bladder control     Percocet [Oxycodone-Acetaminophen] Itching     Reglan [Metoclopramide Hcl] Other (See Comments)     shaking     Soma [Carisoprodol] Visual Disturbance     Sleep walking     Tizanidine      Topamax Other (See Comments)     Topamax [Topiramate] Nausea     Tingling  GI/Vomit     Tramadol      Severe Headache, Seizure Risk     Tylenol W/Codeine [Acetaminophen-Codeine] Nausea and Itching     Tylenol 3     Valium Other (See Comments)     Becomes aggressive and angry     Versed Other (See Comments)     Zolmitriptan      Makes face feel like its twitching     Droperidol Anxiety     Flu Virus Vaccine Rash      Arm swelling       RECOMMENDED DISPOSITION:  To ED/UC for evaluation, another person to drive  Will comply with recommendation: Yes  If further questions/concerns or  if symptoms do not improve, worsen or new symptoms develop, call your PCP or [facility] as soon as possible.      Guideline used: Abdominal pain  Telephone Triage Protocols for Nurses, Fifth Edition, Telma Jimenez RN

## 2018-01-25 NOTE — ED PROVIDER NOTES
History     Chief Complaint   Patient presents with     Nausea, Vomiting, & Diarrhea     Abdominal Pain     HPI  Katy Islas is a 27 year old female who presents with nausea vomiting diarrhea and abdominal pains.  This is been going on for the past 3 days.  Patient has a history of Crohn's disease and a history of C. difficile colitis.  She is wondering if this could be an exacerbation of either 1 of these.  She states she is throwing up about 5 times in the last 24 hours and has had about 8-9 stools in the last 24 hours.  Patient states she has seen some blood in her stools also.  Patient denies any fevers or chills.  Patient denies any dysuria or hematuria.  Patient states there is nothing that seems to make the pain better or worse.    Problem List:    Patient Active Problem List    Diagnosis Date Noted     Iliotibial band syndrome affecting lower leg, right 12/21/2017     Priority: Medium     Chondromalacia of right patellofemoral joint 12/21/2017     Priority: Medium     Postoperative pain 11/13/2017     Priority: Medium     Closed fracture of coccyx with nonunion, subsequent encounter 10/12/2017     Priority: Medium     Upper GI bleed - suspected 07/01/2017     Priority: Medium     Tobacco abuse 07/01/2017     Priority: Medium     History of pseudoseizure 07/01/2017     Priority: Medium     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) 03/10/2017     Priority: Medium     Lumbar disc disease with radiculopathy 02/23/2017     Priority: Medium     S/P lumbar fusion 02/23/2017     Priority: Medium     Dr. YOVANI Millan, 2/23/17       Vitamin D deficiency 01/06/2017     Priority: Medium     Atypical mole 01/06/2017     Priority: Medium     Mild persistent asthma without complication 12/02/2016     Priority: Medium     Chronic bilateral low back pain with left-sided sciatica 12/02/2016     Priority: Medium     Bee sting allergy 09/16/2016     Priority: Medium     Gastroesophageal reflux disease without  esophagitis 08/26/2016     Priority: Medium     Herpes simplex virus infection 11/12/2015     Priority: Medium     Adjustment disorder with mixed anxiety and depressed mood 10/14/2015     Priority: Medium     Marijuana abuse 02/22/2015     Priority: Medium     Grand mal seizure disorder (H) 10/08/2013     Priority: Medium     Bipolar disorder (H) 01/31/2013     Priority: Medium     Problem list name updated by automated process. Provider to review       Nausea and vomiting 12/13/2012     Priority: Medium     Abdominal pain 12/13/2012     Priority: Medium     Oral thrush 06/25/2012     Priority: Medium     Chronic pain 02/09/2012     Priority: Medium     Patient is followed by Aura Rosario PA-C for ongoing prescription of pain medication.  All refills should only be approved by this provider, or covering partner.    Medication(s): Norco    Maximum quantity per month: 70  Clinic visit frequency required: Q 2 months     Controlled substance agreement:   Discussed and signed 4/25/17    Encounter-Level CSA - 01/12/2015:                 Controlled Substance Agreement - Scan on 1/26/2015  9:14 AM : Controlled Medication Agreement-01/12/15 (below)            Pain Clinic evaluation in the past: Yes       Date/Location:   [facility], fall 2016    DIRE Total Score(s):    4/25/2017   Total Score 17       Last [facility] website verification:  done on 4/25/17 by LOVE Maki   [email or web address]           Anxiety 09/22/2011     Priority: Medium     Mild major depression (H) 08/29/2011     Priority: Medium     Crohn's colitis (H) 08/04/2009     Priority: Medium     Dysmenorrhea 05/11/2007     Priority: Medium     Benign neoplasm of skin of lower limb, including hip 03/16/2004     Priority: Medium     Migraine      Priority: Medium     Dr. Farrar - Neurology.  Now on Inderal.  Seems to be working.  Follow-up 9/08  Problem list name updated by automated process. Provider to review       Hearing loss       Priority: Medium     Congenital  Problem list name updated by automated process. Provider to review       Allergic rhinitis      Priority: Medium     Problem list name updated by automated process. Provider to review          Past Medical History:    Past Medical History:   Diagnosis Date     Abdominal pain 10/31- 11/4/2005     Allergic rhinitis, cause unspecified      Arthritis      C. 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     FUSION SPINE POSTERIOR MINIMALLY INVASIVE ONE LEVEL N/A 2/23/2017    Procedure: FUSION SPINE POSTERIOR MINIMALLY INVASIVE ONE LEVEL;  L4-5 Oblique Lateral Lumbar Interbody Fusion   Epidural steroid injection.   Transpedicular Bone marrow aspiration;  Surgeon: Jeniffer Eugene MD;  Location: [facility] OR     HC COLONOSCOPY THRU STOMA WITH BIOPSY  10/29/2003    Impression is that of normal appearing colonoscopy, without evidence of rectal bleeding.     HC COLONOSCOPY THRU STOMA, DIAGNOSTIC  10/00    normal     HC COLONOSCOPY THRU STOMA, DIAGNOSTIC  Oct 2009    Dr López- normal     HC EEG AWAKE AND SLEEP      abnormal     HC MRI BRAIN W/O CONTRAST  12/00    normal     HC REMOVAL GALLBLADDER  8/5/2009    [facility], [facility].     HC UGI ENDOSCOPY  DIAG W BIOPSY  11/11/09    Normal esophagus     HC UGI ENDOSCOPY, SIMPLE EXAM  7/00, 10/00    mild chronic esophagitis and duodenitis, neg H pylori     HC UGI ENDOSCOPY, SIMPLE EXAM  01/20/2005    Esophagogastroduodenoscopy, colonoscopy with biopsies.  [facility]     HC UGI ENDOSCOPY, SIMPLE EXAM  7/1/2009    [facility] [place]     HC UGI ENDOSCOPY, SIMPLE EXAM  11/11/2009    attempted upper GI, pt. could not tolerate procedure:[facility]     ORTHOPEDIC SURGERY  October 19,2011    diskectomy L4-L5       Family History:    Family History   Problem Relation Age of Onset     GASTROINTESTINAL DISEASE Brother      severe Crohn's     Neurologic Disorder Brother      Seizures post head injury     Depression Brother      Substance Abuse Brother      Genitourinary Problems Father      kidney stones     DIABETES Father      HEART DISEASE Father      Open heart surgery     Breast Cancer Maternal Grandmother      Parkinsonism Maternal Grandmother      CEREBROVASCULAR DISEASE Paternal Grandmother      CANCER Maternal Grandfather      Lung     Cardiovascular Paternal Grandfather      Heart Attack     Substance Abuse Mother      Lung Cancer Paternal Uncle        Social History:  Marital Status:  Single [1]  Social History   Substance Use Topics     Smoking status: Current Every Day Smoker     Packs/day: 0.25     Types: Cigarettes     Smokeless tobacco: Never Used     Alcohol use No        Medications:      gabapentin (NEURONTIN) 300 MG capsule   promethazine (PHENERGAN) 6.25 MG/5ML syrup   DULoxetine (CYMBALTA) 60 MG EC capsule   ARIPiprazole (ABILIFY) 10 MG tablet   oxyCODONE IR (ROXICODONE) 10 MG tablet   rizatriptan (MAXALT-MLT) 5 MG ODT tab   meclizine (ANTIVERT) 12.5 MG tablet   diazepam (VALIUM) 5 MG tablet   polyethylene glycol (MIRALAX/GLYCOLAX) Packet   naloxone (NARCAN) nasal spray   NONFORMULARY   cyclobenzaprine (FLEXERIL) 10 MG tablet   dexlansoprazole (DEXILANT) 60 MG CPDR  CR capsule   dicyclomine (BENTYL) 20 MG tablet   medroxyPROGESTERone (DEPO-PROVERA) 150 MG/ML injection   cetirizine (ZYRTEC) 10 MG tablet   medical cannabis inhalation (Patient's own supply.  Not a prescription)   EPINEPHrine (EPIPEN) 0.3 MG/0.3ML injection         Review of Systems   All other systems reviewed and are negative.      Physical Exam   BP: 119/83  Heart Rate: 69  Temp: 97.5  F (36.4  C)  Resp: 14  Weight: 66.7 kg (147 lb 0.8 oz)  SpO2: 94 %      Physical Exam   Constitutional: She is oriented to person, place, and time. She appears well-developed and well-nourished. No distress.   HENT:   Mouth/Throat: Oropharynx is clear and moist.   Eyes: Conjunctivae are normal.   Neck: Normal range of motion. Neck supple.   Cardiovascular: Normal rate, regular rhythm, normal heart sounds and intact distal pulses.  Exam reveals no gallop and no friction rub.    No murmur heard.  Pulmonary/Chest: Effort normal and breath sounds normal. No respiratory distress. She has no wheezes. She has no rales. She exhibits no tenderness.   Abdominal: Soft. Bowel sounds are normal. She exhibits no distension and no mass. There is tenderness (luq/ruq/llq). There is no guarding.   Musculoskeletal: Normal range of motion. She exhibits no edema or tenderness.   Neurological: She is alert and oriented to person, place, and time.   Skin: Skin is warm and dry. No rash noted. She is not diaphoretic.   Psychiatric: She has a normal mood and affect. Judgment normal.   Nursing note and vitals reviewed.      ED Course     ED Course     Procedures           Results for orders placed or performed during the hospital encounter of 01/25/18   CBC with platelets differential   Result Value Ref Range    WBC 3.5 (L) 4.0 - 11.0 10e9/L    RBC Count 4.56 3.8 - 5.2 10e12/L    Hemoglobin 14.6 11.7 - 15.7 g/dL    Hematocrit 43.5 35.0 - 47.0 %    MCV 95 78 - 100 fl    MCH 32.0 26.5 - 33.0 pg    MCHC 33.6 31.5 - 36.5 g/dL    RDW 12.7 10.0 - 15.0 %     Platelet Count 299 150 - 450 10e9/L    Diff Method Automated Method     % Neutrophils 57.2 %    % Lymphocytes 30.9 %    % Monocytes 10.4 %    % Eosinophils 0.6 %    % Basophils 0.9 %    % Immature Granulocytes 0.0 %    Absolute Neutrophil 2.0 1.6 - 8.3 10e9/L    Absolute Lymphocytes 1.1 0.8 - 5.3 10e9/L    Absolute Monocytes 0.4 0.0 - 1.3 10e9/L    Absolute Eosinophils 0.0 0.0 - 0.7 10e9/L    Absolute Basophils 0.0 0.0 - 0.2 10e9/L    Abs Immature Granulocytes 0.0 0 - 0.4 10e9/L   Comprehensive metabolic panel   Result Value Ref Range    Sodium 141 133 - 144 mmol/L    Potassium 3.9 3.4 - 5.3 mmol/L    Chloride 108 94 - 109 mmol/L    Carbon Dioxide 28 20 - 32 mmol/L    Anion Gap 5 3 - 14 mmol/L    Glucose 85 70 - 99 mg/dL    Urea Nitrogen 18 7 - 30 mg/dL    Creatinine 0.77 0.52 - 1.04 mg/dL    GFR Estimate 89 >60 mL/min/1.7m2    GFR Estimate If Black >90 >60 mL/min/1.7m2    Calcium 9.3 8.5 - 10.1 mg/dL    Bilirubin Total 0.6 0.2 - 1.3 mg/dL    Albumin 4.0 3.4 - 5.0 g/dL    Protein Total 8.0 6.8 - 8.8 g/dL    Alkaline Phosphatase 106 40 - 150 U/L    ALT 28 0 - 50 U/L    AST 9 0 - 45 U/L     *Note: Due to a large number of results and/or encounters for the requested time period, some results have not been displayed. A complete set of results can be found in Results Review.     Medications   0.9% sodium chloride BOLUS (1,000 mLs Intravenous New Bag 1/25/18 1232)     Followed by   0.9% sodium chloride infusion (not administered)   HYDROmorphone (DILAUDID) injection 1 mg (1 mg Intravenous Given 1/25/18 1237)   promethazine (PHENERGAN) IV injection 25 mg (25 mg Intravenous Given 1/25/18 1233)   promethazine (PHENERGAN) IV injection 12.5 mg (12.5 mg Intravenous Given 1/25/18 1313)   HYDROmorphone (DILAUDID) injection 1 mg (1 mg Intravenous Given 1/25/18 1318)     Labs were reviewed and were completely normal.  With her having normal vitals and normal labs, I do not think there is any indication for doing advanced imaging at  this point.  Her abdominal pain certainly could be her C. difficile coming back with the diarrhea or possible Crohn's flare.  Patient was unable to give stool samples here so I will put orders in for her to bring them in as an outpatient and she can follow-up with her primary care doctor about these.  In case this is may be an early Crohn's flare, I will do a short course of some steroids to see if this may be helps.  Patient requested pain medications to go home with, she thinks that Norco may help.  I told her I was unable to prescribe any narcotics to her as she just received 112 oxycodone pills 4 days ago.  She states she feels like the oxycodone could be making her pain worse, I told her unfortunately she needs to take this up with her primary care doctor.  I told her I would send her a copy of this note.    Assessments & Plan (with Medical Decision Making)  Abdominal pain, vomiting, diarrhea     I have reviewed the nursing notes.    I have reviewed the findings, diagnosis, plan and need for follow up with the patient.        1/25/2018   [facility] EMERGENCY DEPARTMENT     Oswaldo Zamora MD  01/25/18 [id number]

## 2018-01-25 NOTE — ED NOTES
Writer contacted both [place] and [place] dispatch that patient left and drove herself under the influence of narcotic pain meds.

## 2018-01-26 ENCOUNTER — CARE COORDINATION (OUTPATIENT)
Dept: CARE COORDINATION | Facility: CLINIC | Age: 28
End: 2018-01-26

## 2018-01-26 NOTE — TELEPHONE ENCOUNTER
Pt has not reviewed mychart.  Will PP until Monday and see if pt has reviewed message.  Maya Cardenas, CMA

## 2018-01-26 NOTE — PROGRESS NOTES
Clinic Care Coordination Contact  [facility]/Voicemail    Referral Source: ED Follow-Up  [facility] ED 1/25 N/V  Clinical Data: Care Coordinator Outreach    Outreach attempted x 1 using relay service.  Left message using relay service with call back information and requested return call. MOnday appointment reminder and to call me if issue with transportation    1/29/2018 10:30 AM Aura Rosario PA-C; [facility]       Plan: Care Coordinator will do no further outreaches at this time.    Michael Jamison RN  Clinic Care Coordinator  [facility]  [phone]

## 2018-01-29 ENCOUNTER — OFFICE VISIT (OUTPATIENT)
Dept: FAMILY MEDICINE | Facility: OTHER | Age: 28
End: 2018-01-29
Payer: MEDICARE

## 2018-01-29 VITALS
WEIGHT: 148.2 LBS | SYSTOLIC BLOOD PRESSURE: 108 MMHG | BODY MASS INDEX: 27.62 KG/M2 | RESPIRATION RATE: 14 BRPM | HEART RATE: 82 BPM | DIASTOLIC BLOOD PRESSURE: 78 MMHG | TEMPERATURE: 98.7 F | OXYGEN SATURATION: 100 %

## 2018-01-29 DIAGNOSIS — K50.118 CROHN'S DISEASE OF COLON WITH OTHER COMPLICATION (H): ICD-10-CM

## 2018-01-29 DIAGNOSIS — F31.63 BIPOLAR DISORDER, CURRENT EPISODE MIXED, SEVERE, WITHOUT PSYCHOTIC FEATURES (H): ICD-10-CM

## 2018-01-29 DIAGNOSIS — F41.9 ANXIETY: ICD-10-CM

## 2018-01-29 DIAGNOSIS — M54.42 CHRONIC BILATERAL LOW BACK PAIN WITH LEFT-SIDED SCIATICA: Primary | ICD-10-CM

## 2018-01-29 DIAGNOSIS — M51.16 LUMBAR DISC DISEASE WITH RADICULOPATHY: ICD-10-CM

## 2018-01-29 DIAGNOSIS — G89.29 CHRONIC BILATERAL LOW BACK PAIN WITH LEFT-SIDED SCIATICA: Primary | ICD-10-CM

## 2018-01-29 DIAGNOSIS — Z98.1 S/P LUMBAR FUSION: ICD-10-CM

## 2018-01-29 DIAGNOSIS — G43.109 MIGRAINE WITH AURA AND WITHOUT STATUS MIGRAINOSUS, NOT INTRACTABLE: ICD-10-CM

## 2018-01-29 DIAGNOSIS — G89.4 CHRONIC PAIN SYNDROME: ICD-10-CM

## 2018-01-29 DIAGNOSIS — F32.0 MILD MAJOR DEPRESSION (H): ICD-10-CM

## 2018-01-29 PROCEDURE — 99214 OFFICE O/P EST MOD 30 MIN: CPT | Performed by: PHYSICIAN ASSISTANT

## 2018-01-29 RX ORDER — RIZATRIPTAN BENZOATE 5 MG/1
5-10 TABLET, ORALLY DISINTEGRATING ORAL
Qty: 18 TABLET | Refills: 3 | Status: SHIPPED | OUTPATIENT
Start: 2018-01-29 | End: 2018-06-19

## 2018-01-29 RX ORDER — DIAZEPAM 5 MG
5-10 TABLET ORAL EVERY 12 HOURS PRN
Qty: 60 TABLET | Refills: 2 | Status: SHIPPED | OUTPATIENT
Start: 2018-01-29 | End: 2018-03-26

## 2018-01-29 RX ORDER — OXYCODONE HYDROCHLORIDE 10 MG/1
10 TABLET ORAL EVERY 4 HOURS PRN
Qty: 112 TABLET | Refills: 0 | Status: SHIPPED | OUTPATIENT
Start: 2018-01-29 | End: 2018-02-08

## 2018-01-29 ASSESSMENT — ANXIETY QUESTIONNAIRES
GAD7 TOTAL SCORE: 21
GAD7 TOTAL SCORE: 21
5. BEING SO RESTLESS THAT IT IS HARD TO SIT STILL: NEARLY EVERY DAY
2. NOT BEING ABLE TO STOP OR CONTROL WORRYING: NEARLY EVERY DAY
1. FEELING NERVOUS, ANXIOUS, OR ON EDGE: NEARLY EVERY DAY
7. FEELING AFRAID AS IF SOMETHING AWFUL MIGHT HAPPEN: NEARLY EVERY DAY
6. BECOMING EASILY ANNOYED OR IRRITABLE: NEARLY EVERY DAY
4. TROUBLE RELAXING: NEARLY EVERY DAY
7. FEELING AFRAID AS IF SOMETHING AWFUL MIGHT HAPPEN: NEARLY EVERY DAY
GAD7 TOTAL SCORE: 21
3. WORRYING TOO MUCH ABOUT DIFFERENT THINGS: NEARLY EVERY DAY

## 2018-01-29 ASSESSMENT — PATIENT HEALTH QUESTIONNAIRE - PHQ9
10. IF YOU CHECKED OFF ANY PROBLEMS, HOW DIFFICULT HAVE THESE PROBLEMS MADE IT FOR YOU TO DO YOUR WORK, TAKE CARE OF THINGS AT HOME, OR GET ALONG WITH OTHER PEOPLE: SOMEWHAT DIFFICULT
SUM OF ALL RESPONSES TO PHQ QUESTIONS 1-9: 6
SUM OF ALL RESPONSES TO PHQ QUESTIONS 1-9: 6

## 2018-01-29 NOTE — NURSING NOTE
"Chief Complaint   Patient presents with     Depression     Panel Management     Flu, phq9/isis       Initial Pulse 82  Temp 98.7  F (37.1  C) (Oral)  Resp 14  Wt 148 lb 3.2 oz (67.2 kg)  SpO2 100%  BMI 27.62 kg/m2 Estimated body mass index is 27.62 kg/(m^2) as calculated from the following:    Height as of 1/15/18: 5' 1.42\" (1.56 m).    Weight as of this encounter: 148 lb 3.2 oz (67.2 kg).  Medication Reconciliation: complete    "

## 2018-01-29 NOTE — MR AVS SNAPSHOT
After Visit Summary   1/29/2018    Katy Islas    MRN: [id number]           Patient Information     Date Of Birth          1990        Visit Information        Provider Department      1/29/2018 10:30 AM Aura Rosario PA-C; [facility]        Today's Diagnoses     Need for prophylactic vaccination and inoculation against influenza    -  1    Chronic bilateral low back pain with left-sided sciatica        Chronic pain syndrome        Lumbar disc disease with radiculopathy        S/P lumbar fusion          Care Instructions    [facility] - Psychiatry and counseling   - [place], [place], or [place]     - recheck 2 weeks     - OK to take 1/2 tablet to full tablet in daytime and 1-2 tablets at night             Follow-ups after your visit        Your next 10 appointments already scheduled     Feb 01, 2018  8:00 AM CST   [facility] Visit with Natalie Segovia   [facility]    [address] [place] [address] [id number] [phone]            Feb 12, 2018 10:00 AM CST   Office Visit with Aura Rosario PA-C   [facility]    [address] [place] [address] [id number] [phone]           Bring a current list of meds and any records pertaining to this visit. For Physicals, please bring immunization records and any forms needing to be filled out. Please arrive 10 minutes early to complete paperwork.              Who to contact     If you have questions or need follow up information about today's clinic visit or your schedule please contact [facility] directly at [phone].  Normal or non-critical lab and imaging results will be communicated to you by MyChart, letter or phone within 4 business days after the clinic has received the results. If you do not hear from us within 7 days, please contact the clinic through  [facility] ([facility]) or phone. If you have a critical or abnormal lab result, we will notify you by phone as soon as possible.  Submit refill requests through [employer] ([facility]) or call your pharmacy and they will forward the refill request to us. Please allow 3 business days for your refill to be completed.          Additional Information About Your Visit        [facility] [employer] Information     [employer]) gives you secure access to your electronic health record. If you see a primary care provider, you can also send messages to your care team and make appointments. If you have questions, please call your primary care clinic.  If you do not have a primary care provider, please call [phone] and they will assist you.        Care EveryWhere ID     This is your Care EveryWhere ID. This could be used by other organizations to access your [facility] medical records  [id number]        Your Vitals Were     Pulse Temperature Respirations Pulse Oximetry BMI (Body Mass Index)       82 98.7  F (37.1  C) (Oral) 14 100% 27.62 kg/m2        Blood Pressure from Last 3 Encounters:   01/29/18 108/78   01/25/18 119/83   01/15/18 124/88    Weight from Last 3 Encounters:   01/29/18 148 lb 3.2 oz (67.2 kg)   01/25/18 147 lb 0.8 oz (66.7 kg)   01/15/18 147 lb (66.7 kg)              Today, you had the following     No orders found for display         Today's Medication Changes          These changes are accurate as of 1/29/18 11:03 AM.  If you have any questions, ask your nurse or doctor.               These medicines have changed or have updated prescriptions.        Dose/Directions    diazepam 5 MG tablet   Commonly known as:  VALIUM   This may have changed:  how much to take   Used for:  Chronic pain syndrome, Chronic bilateral low back pain with left-sided sciatica, Lumbar disc disease with radiculopathy, S/P lumbar fusion   Changed by:  Aura Rosario PA-C        Dose:  5-10 mg   Take 1-2 tablets (5-10 mg) by mouth every 12 hours as needed for  anxiety or sleep (MUSCLE SPASM) (one month supply)   Quantity:  60 tablet   Refills:  2            Where to get your medicines      Some of these will need a paper prescription and others can be bought over the counter.  Ask your nurse if you have questions.     Bring a paper prescription for each of these medications     diazepam 5 MG tablet    oxyCODONE IR 10 MG tablet                Primary Care Provider Office Phone # Fax #    Aura DIAZ LOVE Rosario [phone]       [address] [place] [address]  [facility] [id number]        Equal Access to Services     [facility]: Hadii pilo ku hadasho Soomaali, waaxda luqadaha, qaybta kaalmada adeegyada, waxfernando santo . So [facility] [phone].    ATENCIÓN: Si habla español, tiene a kerr disposición servicios gratuitos de asistencia lingüística. LlSt. [facility] [phone].    We comply with applicable federal civil rights laws and [place] laws. We do not discriminate on the basis of race, color, national origin, age, disability, sex, sexual orientation, or gender identity.            Thank you!     Thank you for choosing [facility]  for your care. Our goal is always to provide you with excellent care. Hearing back from our patients is one way we can continue to improve our services. Please take a few minutes to complete the written survey that you may receive in the mail after your visit with us. Thank you!             Your Updated Medication List - Protect others around you: Learn how to safely use, store and throw away your medicines at www.disposemymeds.org.          This list is accurate as of 1/29/18 11:03 AM.  Always use your most recent med list.                   Brand Name Dispense Instructions for use Diagnosis    ARIPiprazole 10 MG tablet    ABILIFY    30 tablet    Take 1 tablet (10 mg) by mouth At Bedtime    Adjustment disorder with mixed anxiety and depressed mood, Anxiety       cetirizine 10 MG tablet     zyrTEC     Take 10 mg by mouth daily        cyclobenzaprine 10 MG tablet    FLEXERIL    60 tablet    Take 2 tablets (20 mg) by mouth nightly as needed for muscle spasms or other (back pain)    Chronic bilateral low back pain with left-sided sciatica       dexlansoprazole 60 MG Cpdr CR capsule    DEXILANT    30 capsule    Take 1 capsule (60 mg) by mouth daily    Gastroesophageal reflux disease without esophagitis       diazepam 5 MG tablet    VALIUM    60 tablet    Take 1-2 tablets (5-10 mg) by mouth every 12 hours as needed for anxiety or sleep (MUSCLE SPASM) (one month supply)    Chronic pain syndrome, Chronic bilateral low back pain with left-sided sciatica, Lumbar disc disease with radiculopathy, S/P lumbar fusion       dicyclomine 20 MG tablet    BENTYL    30 tablet    Take 1 tablet (20 mg) by mouth 4 times daily as needed (ABDOMINAL CRAMPING) AS NEEDED FOR CRAMPING    Hx of Crohn's disease       DULoxetine 60 MG EC capsule    CYMBALTA    90 capsule    Take 1 capsule (60 mg) by mouth daily    Adjustment disorder with mixed anxiety and depressed mood       EPINEPHrine 0.3 MG/0.3ML injection 2-pack    EPIPEN/ADRENACLICK/or ANY BX GENERIC EQUIV    0.6 mL    Inject 0.3 mLs (0.3 mg) into the muscle once as needed for anaphylaxis    Bee sting allergy       gabapentin 300 MG capsule    NEURONTIN    90 capsule    Take 1 capsule (300 mg) by mouth 3 times daily    Chronic bilateral low back pain with left-sided sciatica       meclizine 12.5 MG tablet    ANTIVERT    30 tablet    Take 1 tablet (12.5 mg) by mouth 4 times daily as needed for dizziness    Dizziness       medical cannabis inhalation (Patient's own supply.  Not a prescription)      Inhale into the lungs 3 times daily as needed Reported on 3/28/2017        medroxyPROGESTERone 150 MG/ML injection    DEPO-PROVERA    3 mL    Inject 1 mL (150 mg) into the muscle every 3 months    Encounter for surveillance of injectable contraceptive, Dysmenorrhea        methylPREDNISolone 4 MG tablet    MEDROL DOSEPAK    21 tablet    Follow package instructions        naloxone nasal spray    NARCAN    0.2 mL    Spray 1 spray (4 mg) into one nostril alternating nostrils as needed for opioid reversal every 2-3 minutes until assistance arrives    Chronic pain syndrome, Chronic bilateral low back pain with left-sided sciatica, S/P lumbar fusion, Lumbar disc disease with radiculopathy       NONFORMULARY      Apply 30 mLs topically 4 times daily        oxyCODONE IR 10 MG tablet    ROXICODONE    112 tablet    Take 1 tablet (10 mg) by mouth every 4 hours as needed for moderate to severe pain (Max 8/day)    Chronic bilateral low back pain with left-sided sciatica       polyethylene glycol Packet    MIRALAX/GLYCOLAX     Take 1 packet by mouth daily        promethazine 6.25 MG/5ML syrup    PHENERGAN    560 mL    Take 10 mLs (12.5 mg) by mouth 4 times daily as needed for nausea    Nausea       rizatriptan 5 MG ODT tab    MAXALT-MLT    18 tablet    Take 1-2 tablets (5-10 mg) by mouth at onset of headache for migraine May repeat in 2 hours. Max 6 tablets/24 hours.    Migraine with aura and without status migrainosus, not intractable

## 2018-01-29 NOTE — PATIENT INSTRUCTIONS
[facility] - Psychiatry and counseling   - [place], [place], or [place]     - recheck 2 weeks     - OK to take 1/2 tablet to full tablet in daytime and 1-2 tablets at night

## 2018-01-30 ASSESSMENT — ANXIETY QUESTIONNAIRES: GAD7 TOTAL SCORE: 21

## 2018-01-30 ASSESSMENT — PATIENT HEALTH QUESTIONNAIRE - PHQ9: SUM OF ALL RESPONSES TO PHQ QUESTIONS 1-9: 6

## 2018-02-02 NOTE — PROGRESS NOTES
"Clinic Care Coordination Contact--Social Work Follow Up Call/Assessment  Care Team Conversations    Julisa from [facility] called SW regarding Patient's transportation.  She reports having spoken with Victorino at [employer] who states Patient should qualify for [employer] transportation services--unless she uses a wheelchair.  If Patient uses a wheelchair, it would be necessary for PCP to complete a certificate of need on [facility] web site before being eligible.  Julisa reports she has been unable to reach Patient to discuss.  She requests SW to update Patient should SW talk with Patient.  CHRISTIANNE called Patient via [place] [employer] ([phone]),  reports that Patient stated \"will you please stop calling me and shut the fuck up\" and hung up.  Patient has scheduled appointment with PCP on 2/12/2018.  It is uncertain if she has transportation in place.    Plan: 1) CHRISTIANNE will update PCP and attempt to call Patient next week     DOYLE Mosley, MSW   [phone]  2/2/2018 4:42 PM        "

## 2018-02-02 NOTE — PROGRESS NOTES
Care Coordination Social Work Follow Up Call/Assessment    CHRISTIANNE called [facility] ([phone]) to discuss Patient's transportation benefits.  CHRISTIANNE spoke with Ariana at [facility].  Ariana reports that Patient does not qualify for medical transportation with [facility] [employer].  Reference # for our call is [phone].  She reports her [employer]  is Kenzie Hodge ([phone]).  SW left message for Kenzie requesting assistance with transportation for Patient.  SW provided her and clinic SW's contact information and requested return call.      Plan: 1) This SW will follow this Patient at present.  Will update clinic SW    DOYLE Mosley, MSW   [phone]  2/2/2018 4:13 PM    Return call from Kenzie at [facility].  She reports she works with [facility] and is attempting to reach Patient to assess her for case management services.  She has been unable to reach Patient to discuss her needs.  Kenzie states her understanding is that Patient is having difficulty getting access for [employer].  CHRISTIANNE informed that Ariana at [facility] informed SW that Patient is not eligible for that service, which Kenzie thought was unusual stating Patient should be.  Kenzie will attempt to reach Patient and will call SW with an update.    DOYLE Mosley, MSW   [phone]  2/2/2018 4:19 PM

## 2018-02-05 ENCOUNTER — TRANSFERRED RECORDS (OUTPATIENT)
Dept: HEALTH INFORMATION MANAGEMENT | Facility: CLINIC | Age: 28
End: 2018-02-05

## 2018-02-05 ENCOUNTER — TELEPHONE (OUTPATIENT)
Dept: FAMILY MEDICINE | Facility: OTHER | Age: 28
End: 2018-02-05

## 2018-02-05 NOTE — TELEPHONE ENCOUNTER
Reason for Call:  Form, our goal is to have forms completed with 72 hours, however, some forms may require a visit or additional information.    Type of letter, form or note:  [employer]    Who is the form from?: [employer] (if other please explain)    Where did the form come from: form was faxed in    What clinic location was the form placed at?: [facility] - [phone]    Where the form was placed: 's Box    What number is listed as a contact on the form?: [phone]       Additional comments: Fax to     Call taken on 2/5/2018 at 1:44 PM by Leisa Marquez

## 2018-02-05 NOTE — PROGRESS NOTES
Clinic Care Coordination Contact--Social Work Follow Up Call/Assessment   Care Team Conversations    Patient has been having difficulty obtaining transportation for medical appointments.  CHRISTIANNE recently found that Patient is eligible for Prov[employer] a [employer] with [employer], please see below.  CHRISTIANNE has been unable to reach Patient to inform of transportation option.  CHRISTIANNE attempted again today via [place] [employer] ([phone]).  Patient was not available and message left regarding below.  CHRISTIANNE stated to request to talk to Victorino at [employer] if there are any concerns.  CHRISTIANNE left message for Julisa at [employer] ([phone]) to update.      Plan: 1) CHRISTIANNE will update PCP and close to Care Coordination as cannot reach Patient    DOYLE Mosley, MSW   [phone]  2/5/2018 1:19 PM

## 2018-02-05 NOTE — PROGRESS NOTES
SUBJECTIVE:   Katy Islas is a 27 year old female who presents to clinic today for the following health issues:    HPI       Depression and Anxiety Follow-Up    Status since last visit: worsened     Other associated symptoms:None    Complicating factors:     Significant life event: Yes-  Still looking for a place to live and mom living in [place] now.     Current substance abuse: None    - Wondering if vitamin D is low.  - Thiago left her a message to call back to schedule   - Hydroxyzine didn't work       PHQ-9 1/15/2018 1/29/2018 2/8/2018   Total Score 20 6 16   Q9: Suicide Ideation Nearly every day Not at all Several days     JULIETH-7 SCORE 12/5/2017 1/15/2018 1/29/2018   Total Score - - -   Total Score 4 (minimal anxiety) 21 (severe anxiety) 21 (severe anxiety)   Total Score 4 21 21     In the past two weeks have you had thoughts of suicide or self-harm?  No.    Do you have concerns about your personal safety or the safety of others?   No      Chronic Pain Follow-Up       Type / Location of Pain: low back and hip  Analgesia/pain control:       Recent changes:  Same on back and hip is worse      Overall control: Tolerable with discomfort  Activity level/function:      Daily activities:  Able to do all daily activities    Work:  not applicable. Looking for a job.   Adverse effects:  No  Adherance    Taking medication as directed?  Yes    Participating in other treatments: yes, Cannabis   Risk Factors:    Sleep:  Fair    Mood/anxiety:  worsened    Recent family or social stressors:  job change/loss and mom moved    Other aggravating factors: prolonged sitting, clenching of jaw and Katy grinds her teeth      - Physical therapy twice a week ([facility])  - What are her lifting restrictions?  - Would like to discuss meds other than valium.      Makes more angry, 3 people noticed this and told her   - Flexeril helps sleep, not as sore in the morning, can't take during day, too groggy   - Soon out of pain  medications   - 8-10 per day,   - Hip hurts from stretching   - Pool therapy helping         PHQ-9 SCORE 12/5/2017 1/15/2018 1/29/2018   Total Score - - -   Total Score MyChart 4 (Minimal depression) 20 (Severe depression) 6 (Mild depression)   Total Score 4 20 6     JULIETH-7 SCORE 12/5/2017 1/15/2018 1/29/2018   Total Score - - -   Total Score 4 (minimal anxiety) 21 (severe anxiety) 21 (severe anxiety)   Total Score 4 21 21     Encounter-Level CSA - 04/25/2017:          Controlled Substance Agreement - Scan on 5/1/2017 11:06 AM : CONTROLLED SUBSTANCE AGREEMENT (below)            Encounter-Level CSA - 04/25/2017:          Controlled Substance Agreement - Scan on 1/26/2015  9:14 AM : Controlled Medication Agreement-01/12/15 (below)              Problem list and histories reviewed & adjusted, as indicated.  Additional history: as documented        Plan from last visit on 1/29/18  1-4. Pain   -  reviewed - as expected      Last fills from Dr. Millan her surgeon      1/12/18 - Norco 7.5-325 mg, #60      1/3/18 - Oxycodone 20 mg, #90      Rest from myself   - Oxycodone 10 mg, recommend 8 per day, 80 mg/day,    - Reviewed use and side effects including sedation and addiction, patient doesn't drive   - Will pause taper as patient is starting physical therapy today,  Will continue to recheck every 2 weeks and eventually continue to taper down by 10-20 mg per day per 2 week interval    - Patient also encouraged to cut back where able   - Continue with plans for pool therapy  - Reviewed use and side effects of Valium as well, will allow small increase to 10 mg at night      5. Crohn's   - Reviewed ED note   - Advised follow up with her GI specialist to see if anything further can be done      7-9. Mood  - Improving on Abilify 10 mg at night, has been 2 weeks   - Continue on rest of medications as prescribed   - Encouraged again appointment with psychiatry and counseling      [facility] in [place] is  not taking new patients     Patient did not get my message about [facility] in [place], [place], or [place]     Gave her contact information and website to call      The patient indicates understanding of these issues and agrees with the plan.     Follow up: 2 weeks         Labs reviewed in EPIC    ROS:  Constitutional, HEENT, cardiovascular, pulmonary, gi and gu systems are negative, except as otherwise noted.    OBJECTIVE:   /82  Pulse 94  Temp 98.7  F (37.1  C) (Temporal)  Resp 16  Wt 148 lb 12.8 oz (67.5 kg)  SpO2 99%  BMI 27.74 kg/m2  Body mass index is 27.74 kg/(m^2).  GENERAL APPEARANCE: healthy, alert and no distress  EYES: Eyes grossly normal to inspection, PERRLA, conjunctivae and sclerae without injection or discharge, EOM intact   MS: No musculoskeletal defects are noted and gait is age appropriate without ataxia   SKIN: No suspicious lesions or rashes, hydration status appears adeuqate with normal skin turgor   BACK: Declined per patient   PSYCH: Alert and oriented x3; speech- coherent , normal rate and volume; able to articulate logical thoughts, able to abstract reason, no tangential thoughts, no hallucinations or delusions, mentation appears normal, Mood is euthymic. Affect is appropriate for this mood state and bright. Thought content is free of suicidal ideation, hallucinations, and delusions. Dress is adequate and upkept. Eye contact is good during conversation.       Diagnostic Test Results:  none     ASSESSMENT/PLAN:       ICD-10-CM    1. Chronic bilateral low back pain with left-sided sciatica M54.42 gabapentin (NEURONTIN) 300 MG capsule    G89.29 cyclobenzaprine (FLEXERIL) 10 MG tablet     oxyCODONE IR (ROXICODONE) 10 MG tablet   2. Lumbar disc disease with radiculopathy M51.16    3. Adjustment disorder with mixed anxiety and depressed mood F43.23 ARIPiprazole (ABILIFY) 15 MG tablet   4. Anxiety F41.9 ARIPiprazole (ABILIFY) 15 MG tablet   5. Mild major depression (H) F32.0    6.  Bipolar disorder, current episode mixed, severe, without psychotic features (H) F31.63      1 & 2. Back pain   -  reviewed - as expected      Last fills from Dr. Millan her surgeon      1/12/17 - Norco 7.5-325 mg, #60      1/3/17 - Oxycodone 20 mg, #90      Rest from myself   - Oxycodone 10 mg, recommend 8 per day, 80 mg/day,       Patient states 2 days took 10, discussed this is not how I ordered and is a violation of her CSA, will give her 1 more chance but if she violates again I will have to taper her off and stop filling, however we are already tapering   - Reviewed use and side effects including sedation and addiction, patient doesn't drive   - Started physical therapy today,  Will continue to recheck every 2 weeks and eventually continue to taper down by 10-20 mg per day per 2 week interval       Discussed need her to decrease to 6-8 per day by next visit, next visit will prescribe 6/day no matter what   - Patient also encouraged to cut back where able   - Continue with plans for pool therapy  - Reviewed use and side effects of Valium as well, not likely to be causing anger  - Can continue this 1-2 per day, reviewed use and side effects   - Continue Flexeril at night, do not mix with valium, reviewed use and side effects   - Continue Gabapentin, reviewed use and side effects  - Recheck 2 weeks     3-6. Mood   - Again discussed the importance of appointment with [facility] for psychiatry   - Discussed I am not willing to prescribe anything else for her mood, was requesting Xanax, Klonopin, or Ativan for her anger, discussed these don't work for anger only panic attacks, discussed I am not willing to give them due to risks of interaction with her narcotic medications, discussed this is already a risk when on Valium - reviewed this in depth   - However, will increase Abilify from 10 mg to 15 mg, reviewed use and side effects   - Recheck 2 weeks   - Patient denied SI and verbally consented to safety        The patient indicates understanding of these issues and agrees with the plan.    Follow up: PRN     Due to language barrier, an  was present during the history-taking and subsequent discussion (and for part of the physical exam) with this patient.          Aura Rosario PA-C  [facility]

## 2018-02-08 ENCOUNTER — OFFICE VISIT (OUTPATIENT)
Dept: FAMILY MEDICINE | Facility: OTHER | Age: 28
End: 2018-02-08
Payer: MEDICARE

## 2018-02-08 VITALS
OXYGEN SATURATION: 99 % | HEART RATE: 94 BPM | DIASTOLIC BLOOD PRESSURE: 82 MMHG | RESPIRATION RATE: 16 BRPM | BODY MASS INDEX: 27.74 KG/M2 | SYSTOLIC BLOOD PRESSURE: 118 MMHG | TEMPERATURE: 98.7 F | WEIGHT: 148.8 LBS

## 2018-02-08 DIAGNOSIS — F41.9 ANXIETY: ICD-10-CM

## 2018-02-08 DIAGNOSIS — F32.0 MILD MAJOR DEPRESSION (H): ICD-10-CM

## 2018-02-08 DIAGNOSIS — F43.23 ADJUSTMENT DISORDER WITH MIXED ANXIETY AND DEPRESSED MOOD: ICD-10-CM

## 2018-02-08 DIAGNOSIS — G89.29 CHRONIC BILATERAL LOW BACK PAIN WITH LEFT-SIDED SCIATICA: Primary | ICD-10-CM

## 2018-02-08 DIAGNOSIS — M51.16 LUMBAR DISC DISEASE WITH RADICULOPATHY: ICD-10-CM

## 2018-02-08 DIAGNOSIS — E55.9 VITAMIN D DEFICIENCY: ICD-10-CM

## 2018-02-08 DIAGNOSIS — M54.42 CHRONIC BILATERAL LOW BACK PAIN WITH LEFT-SIDED SCIATICA: Primary | ICD-10-CM

## 2018-02-08 DIAGNOSIS — F31.63 BIPOLAR DISORDER, CURRENT EPISODE MIXED, SEVERE, WITHOUT PSYCHOTIC FEATURES (H): ICD-10-CM

## 2018-02-08 PROCEDURE — 99214 OFFICE O/P EST MOD 30 MIN: CPT | Performed by: PHYSICIAN ASSISTANT

## 2018-02-08 RX ORDER — OXYCODONE HYDROCHLORIDE 10 MG/1
10 TABLET ORAL EVERY 4 HOURS PRN
Qty: 112 TABLET | Refills: 0 | Status: SHIPPED | OUTPATIENT
Start: 2018-02-12 | End: 2018-02-20

## 2018-02-08 RX ORDER — GABAPENTIN 300 MG/1
300 CAPSULE ORAL 3 TIMES DAILY
Qty: 90 CAPSULE | Refills: 3 | Status: SHIPPED | OUTPATIENT
Start: 2018-02-08 | End: 2018-03-26

## 2018-02-08 RX ORDER — ARIPIPRAZOLE 15 MG/1
15 TABLET ORAL AT BEDTIME
Qty: 30 TABLET | Refills: 3 | Status: SHIPPED | OUTPATIENT
Start: 2018-02-08 | End: 2018-03-26

## 2018-02-08 RX ORDER — CYCLOBENZAPRINE HCL 10 MG
20 TABLET ORAL
Qty: 60 TABLET | Refills: 3 | Status: SHIPPED | OUTPATIENT
Start: 2018-02-08 | End: 2018-03-26

## 2018-02-08 ASSESSMENT — PAIN SCALES - GENERAL: PAINLEVEL: MODERATE PAIN (4)

## 2018-02-08 ASSESSMENT — PATIENT HEALTH QUESTIONNAIRE - PHQ9
SUM OF ALL RESPONSES TO PHQ QUESTIONS 1-9: 16
10. IF YOU CHECKED OFF ANY PROBLEMS, HOW DIFFICULT HAVE THESE PROBLEMS MADE IT FOR YOU TO DO YOUR WORK, TAKE CARE OF THINGS AT HOME, OR GET ALONG WITH OTHER PEOPLE: VERY DIFFICULT
SUM OF ALL RESPONSES TO PHQ QUESTIONS 1-9: 16

## 2018-02-08 NOTE — PATIENT INSTRUCTIONS
- Increase Abilify to 15 mg     - Decrease oxycodone use to 6-8 per day by next recheck in 2 weeks     - Recheck 2 weeks

## 2018-02-08 NOTE — MR AVS SNAPSHOT
After Visit Summary   2/8/2018    Katy Islas    MRN: [id number]           Patient Information     Date Of Birth          1990        Visit Information        Provider Department      2/8/2018 10:45 AM Aura Rosario PA-C; [facility]        Today's Diagnoses     Chronic bilateral low back pain with left-sided sciatica        Adjustment disorder with mixed anxiety and depressed mood        Anxiety          Care Instructions    - Increase Abilify to 15 mg     - Decrease oxycodone use to 6-8 per day by next recheck in 2 weeks     - Recheck 2 weeks               Follow-ups after your visit        Who to contact     If you have questions or need follow up information about today's clinic visit or your schedule please contact [facility] directly at [phone].  Normal or non-critical lab and imaging results will be communicated to you by MyChart, letter or phone within 4 business days after the clinic has received the results. If you do not hear from us within 7 days, please contact the clinic through [employer] or phone. If you have a critical or abnormal lab result, we will notify you by phone as soon as possible.  Submit refill requests through [employer] or call your pharmacy and they will forward the refill request to us. Please allow 3 business days for your refill to be completed.          Additional Information About Your Visit        MyChart Information     [employer] gives you secure access to your electronic health record. If you see a primary care provider, you can also send messages to your care team and make appointments. If you have questions, please call your primary care clinic.  If you do not have a primary care provider, please call [phone] and they will assist you.        Care EveryWhere ID     This is your Care EveryWhere ID. This could be used by other organizations to access your [facility] medical records  [id number]         Your Vitals Were     Pulse Temperature Respirations Pulse Oximetry BMI (Body Mass Index)       94 98.7  F (37.1  C) (Temporal) 16 99% 27.74 kg/m2        Blood Pressure from Last 3 Encounters:   02/08/18 118/82   01/29/18 108/78   01/25/18 119/83    Weight from Last 3 Encounters:   02/08/18 148 lb 12.8 oz (67.5 kg)   01/29/18 148 lb 3.2 oz (67.2 kg)   01/25/18 147 lb 0.8 oz (66.7 kg)              Today, you had the following     No orders found for display         Today's Medication Changes          These changes are accurate as of 2/8/18 11:32 AM.  If you have any questions, ask your nurse or doctor.               These medicines have changed or have updated prescriptions.        Dose/Directions    ARIPiprazole 15 MG tablet   Commonly known as:  ABILIFY   This may have changed:    - medication strength  - how much to take   Used for:  Adjustment disorder with mixed anxiety and depressed mood, Anxiety   Changed by:  Aura Rosario PA-C        Dose:  15 mg   Take 1 tablet (15 mg) by mouth At Bedtime   Quantity:  30 tablet   Refills:  3            Where to get your medicines      [facility] [address] [place] [address] [address]  [address] [facility] [address]     Phone:  [phone]     ARIPiprazole 15 MG tablet    cyclobenzaprine 10 MG tablet    gabapentin 300 MG capsule         Some of these will need a paper prescription and others can be bought over the counter.  Ask your nurse if you have questions.     Bring a paper prescription for each of these medications     oxyCODONE IR 10 MG tablet                Primary Care Provider Office Phone # Fax #    Aura Rosario PA-C [phone]       [address] [place] [address] [address]  [facility] [address]        Equal Access to Services     VIKY VILLANUEVA AH: Ruth Chen, mayank kelly, susan encinas  brayden tomlinалександр laFelishayani ah. So [facility] [phone].    ATENCIÓN: Si ilianala venita, tiene a kerr disposición servicios gratuitos de asistencia lingüística. [place] al [phone].    We comply with applicable federal civil rights laws and [place] laws. We do not discriminate on the basis of race, color, national origin, age, disability, sex, sexual orientation, or gender identity.            Thank you!     Thank you for choosing [facility]  for your care. Our goal is always to provide you with excellent care. Hearing back from our patients is one way we can continue to improve our services. Please take a few minutes to complete the written survey that you may receive in the mail after your visit with us. Thank you!             Your Updated Medication List - Protect others around you: Learn how to safely use, store and throw away your medicines at www.disposemymeds.org.          This list is accurate as of 2/8/18 11:32 AM.  Always use your most recent med list.                   Brand Name Dispense Instructions for use Diagnosis    ARIPiprazole 15 MG tablet    ABILIFY    30 tablet    Take 1 tablet (15 mg) by mouth At Bedtime    Adjustment disorder with mixed anxiety and depressed mood, Anxiety       cetirizine 10 MG tablet    zyrTEC     Take 10 mg by mouth daily        cyclobenzaprine 10 MG tablet    FLEXERIL    60 tablet    Take 2 tablets (20 mg) by mouth nightly as needed for muscle spasms or other (back pain)    Chronic bilateral low back pain with left-sided sciatica       dexlansoprazole 60 MG Cpdr CR capsule    DEXILANT    30 capsule    Take 1 capsule (60 mg) by mouth daily    Gastroesophageal reflux disease without esophagitis       diazepam 5 MG tablet    VALIUM    60 tablet    Take 1-2 tablets (5-10 mg) by mouth every 12 hours as needed for anxiety or sleep (MUSCLE SPASM) (one month supply)    Chronic pain syndrome, Chronic bilateral low back pain with left-sided sciatica, Lumbar disc disease  with radiculopathy, S/P lumbar fusion       dicyclomine 20 MG tablet    BENTYL    30 tablet    Take 1 tablet (20 mg) by mouth 4 times daily as needed (ABDOMINAL CRAMPING) AS NEEDED FOR CRAMPING    Hx of Crohn's disease       DULoxetine 60 MG EC capsule    CYMBALTA    90 capsule    Take 1 capsule (60 mg) by mouth daily    Adjustment disorder with mixed anxiety and depressed mood       EPINEPHrine 0.3 MG/0.3ML injection 2-pack    EPIPEN/ADRENACLICK/or ANY BX GENERIC EQUIV    0.6 mL    Inject 0.3 mLs (0.3 mg) into the muscle once as needed for anaphylaxis    Bee sting allergy       gabapentin 300 MG capsule    NEURONTIN    90 capsule    Take 1 capsule (300 mg) by mouth 3 times daily    Chronic bilateral low back pain with left-sided sciatica       meclizine 12.5 MG tablet    ANTIVERT    30 tablet    Take 1 tablet (12.5 mg) by mouth 4 times daily as needed for dizziness    Dizziness       medical cannabis inhalation (Patient's own supply.  Not a prescription)      Inhale into the lungs 3 times daily as needed Reported on 3/28/2017        medroxyPROGESTERone 150 MG/ML injection    DEPO-PROVERA    3 mL    Inject 1 mL (150 mg) into the muscle every 3 months    Encounter for surveillance of injectable contraceptive, Dysmenorrhea       naloxone nasal spray    NARCAN    0.2 mL    Spray 1 spray (4 mg) into one nostril alternating nostrils as needed for opioid reversal every 2-3 minutes until assistance arrives    Chronic pain syndrome, Chronic bilateral low back pain with left-sided sciatica, S/P lumbar fusion, Lumbar disc disease with radiculopathy       NONFORMULARY      Apply 30 mLs topically 4 times daily        oxyCODONE IR 10 MG tablet   Start taking on:  2/12/2018    ROXICODONE    112 tablet    Take 1 tablet (10 mg) by mouth every 4 hours as needed for moderate to severe pain (Max 8/day)    Chronic bilateral low back pain with left-sided sciatica       polyethylene glycol Packet    MIRALAX/GLYCOLAX     Take 1 packet by  mouth daily        promethazine 6.25 MG/5ML syrup    PHENERGAN    560 mL    Take 10 mLs (12.5 mg) by mouth 4 times daily as needed for nausea    Nausea       rizatriptan 5 MG ODT tab    MAXALT-MLT    18 tablet    Take 1-2 tablets (5-10 mg) by mouth at onset of headache for migraine May repeat in 2 hours. Max 6 tablets/24 hours.    Migraine with aura and without status migrainosus, not intractable

## 2018-02-08 NOTE — NURSING NOTE
"Chief Complaint   Patient presents with     Pain     recheck     Panel Management     flu, act       Initial /82  Pulse 94  Temp 98.7  F (37.1  C) (Temporal)  Resp 16  Wt 148 lb 12.8 oz (67.5 kg)  SpO2 99%  BMI 27.74 kg/m2 Estimated body mass index is 27.74 kg/(m^2) as calculated from the following:    Height as of 1/15/18: 5' 1.42\" (1.56 m).    Weight as of this encounter: 148 lb 12.8 oz (67.5 kg).  Medication Reconciliation: complete    "

## 2018-02-09 ASSESSMENT — PATIENT HEALTH QUESTIONNAIRE - PHQ9: SUM OF ALL RESPONSES TO PHQ QUESTIONS 1-9: 16

## 2018-02-12 NOTE — PROGRESS NOTES
SUBJECTIVE:   Katy Islas is a 27 year old female who presents to clinic today for the following health issues:      HPI     Depression and Anxiety Follow-Up    Status since last visit: Improved    Other associated symptoms:None    Complicating factors:     Significant life event: No     Current substance abuse:  Medical marijuana     - Doing well on Abilify 15 mg at night   - Appointment at [facility] in March with psychiatry   - Counseling weekly       Back Pain Follow Up      Description:   Location of pain:  Bilateral now is in upper back and neck  Character of pain: dull ache  Pain radiation: Does not radiate and up back and neck  Since last visit, pain is:  worsened  New numbness or weakness in legs, not attributed to pain:  no     Intensity: Moderate; Currently 5/10    History:   Pain interferes with job: Not applicable  Therapies tried without relief: cold and NSAIDs  Therapies tried with relief: acetaminophen (Tylenol), chiropractor, heat, muscle relaxants, opioids and Physical Therapy     - Pulled a muscle last week   - Physical therapy and pool therapy, last physical therapy tomorrow   - After care at [facility]   - taking 6/day pain medications consistently since last Thursday   - Taking tylenol and flexeril as needed   - 2 valium per day         PHQ-9 1/29/2018 2/8/2018 2/20/2018   Total Score 6 16 2   Q9: Suicide Ideation Not at all Several days Not at all     JULIETH-7 SCORE 1/15/2018 1/29/2018 2/20/2018   Total Score - - -   Total Score 21 (severe anxiety) 21 (severe anxiety) 3 (minimal anxiety)   Total Score 21 21 3     In the past two weeks have you had thoughts of suicide or self-harm?  No.    Do you have concerns about your personal safety or the safety of others?   No        Plan from last visit 2/8/18  1 & 2. Back pain   -  reviewed - as expected      Last fills from Dr. Millan her surgeon      1/12/17 - Norco 7.5-325 mg, #60      1/3/17 - Oxycodone 20 mg, #90      Rest from myself   - Oxycodone  10 mg, recommend 8 per day, 80 mg/day,       Patient states 2 days took 10, discussed this is not how I ordered and is a violation of her CSA, will give her 1 more chance but if she violates again I will have to taper her off and stop filling, however we are already tapering   - Reviewed use and side effects including sedation and addiction, patient doesn't drive   - Started physical therapy today,  Will continue to recheck every 2 weeks and eventually continue to taper down by 10-20 mg per day per 2 week interval       Discussed need her to decrease to 6-8 per day by next visit, next visit will prescribe 6/day no matter what   - Patient also encouraged to cut back where able   - Continue with plans for pool therapy  - Reviewed use and side effects of Valium as well, not likely to be causing anger  - Can continue this 1-2 per day, reviewed use and side effects   - Continue Flexeril at night, do not mix with valium, reviewed use and side effects   - Continue Gabapentin, reviewed use and side effects  - Recheck 2 weeks   3-6. Mood   - Again discussed the importance of appointment with [facility] for psychiatry   - Discussed I am not willing to prescribe anything else for her mood, was requesting Xanax, Klonopin, or Ativan for her anger, discussed these don't work for anger only panic attacks, discussed I am not willing to give them due to risks of interaction with her narcotic medications, discussed this is already a risk when on Valium - reviewed this in depth   - However, will increase Abilify from 10 mg to 15 mg, reviewed use and side effects   - Recheck 2 weeks   - Patient denied SI and verbally consented to safety         Labs reviewed in EPIC    ROS:  Constitutional, HEENT, cardiovascular, pulmonary, gi and gu systems are negative, except as otherwise noted.    OBJECTIVE:   /82  Pulse 124  Temp 98.5  F (36.9  C) (Oral)  Resp 14  Wt 142 lb 8 oz (64.6 kg)  SpO2 100%  BMI 26.56 kg/m2  Body mass  index is 26.56 kg/(m^2).  GENERAL APPEARANCE: healthy, alert and no distress  EYES: Eyes grossly normal to inspection, PERRLA, conjunctivae and sclerae without injection or discharge, EOM intact   MS: No musculoskeletal defects are noted and gait is age appropriate without ataxia   SKIN: No suspicious lesions or rashes, hydration status appears adeuqate with normal skin turgor   BACK: Deferred  PSYCH: Alert and oriented x3; speech- coherent , normal rate and volume; able to articulate logical thoughts, able to abstract reason, no tangential thoughts, no hallucinations or delusions, mentation appears normal, Mood is euthymic. Affect is appropriate for this mood state and bright. Thought content is free of suicidal ideation, hallucinations, and delusions. Dress is adequate and upkept. Eye contact is good during conversation.       Diagnostic Test Results:  none     ASSESSMENT/PLAN:       ICD-10-CM    1. Mild major depression (H) F32.0 cloNIDine (CATAPRES) 0.1 MG tablet   2. Anxiety F41.9 cloNIDine (CATAPRES) 0.1 MG tablet   3. Bipolar disorder, current episode mixed, severe, without psychotic features (H) F31.63    4. Chronic pain syndrome G89.4 cloNIDine (CATAPRES) 0.1 MG tablet   5. Chronic bilateral low back pain with left-sided sciatica M54.42 oxyCODONE IR (ROXICODONE) 10 MG tablet    G89.29 cloNIDine (CATAPRES) 0.1 MG tablet   6. Postoperative pain G89.18 cloNIDine (CATAPRES) 0.1 MG tablet   7. S/P lumbar fusion Z98.1      1-3. Mood   - Improving on increased Abilify (15 mg), no change at this time, continues on Cymbalta as well without change   - Has appointment next month with [facility] for psychiatry  - Continues weekly counseling   - Recheck 2 weeks   - Patient denied SI and verbally consented to safety       4-7. Back pain   -  reviewed - as expected      Last fills from Dr. Millan her surgeon      1/12/17 - Norco 7.5-325 mg, #60      1/3/17 - Oxycodone 20 mg, #90      Rest from myself   - Oxycodone 10 mg,  recommend 6 per day (decreased from 8/day as I had asked her to work on)      Will give next RX for 5/day, Oxycodone 10 mg, 50 mg/day, MME 75 mg      Will also ok early fill due to her going on a [employer] retreat on Thursday for a full week      At next visit, will plan to decrease to 40 mg per day, and per patient request switch to Percocet (for now continue at home Tylenol - reviewed max dose 3g)          Next script - 3/6/18- Percocet  mg, 4/day   - Continue with plans for pool therapy  - Can continue Valium 1-2 per day, reviewed use and side effects   - Continue Flexeril at night, do not mix with valium, reviewed use and side effects   - Continue Gabapentin, reviewed use and side effects  - Recheck 2-3 weeks        The patient indicates understanding of these issues and agrees with the plan.    Follow up: 2-3 weeks, before needing refill of pain medication       Patient was seen and examined additionally by PA student from [facility], RENATO Frausto.     Due to language barrier, an  was present during the history-taking and subsequent discussion (and for part of the physical exam) with this patient.        Aura Rosario PA-C  [facility]

## 2018-02-14 ENCOUNTER — MYC MEDICAL ADVICE (OUTPATIENT)
Dept: FAMILY MEDICINE | Facility: OTHER | Age: 28
End: 2018-02-14

## 2018-02-14 ENCOUNTER — OFFICE VISIT (OUTPATIENT)
Dept: AUDIOLOGY | Facility: OTHER | Age: 28
End: 2018-02-14
Payer: MEDICARE

## 2018-02-14 DIAGNOSIS — H90.3 SENSORINEURAL HEARING LOSS, BILATERAL: Primary | ICD-10-CM

## 2018-02-14 PROCEDURE — 92593 HC HEARING AID CHECK, BINAURAL: CPT | Performed by: AUDIOLOGIST

## 2018-02-14 NOTE — MR AVS SNAPSHOT
After Visit Summary   2/14/2018    Katy Islas    MRN: [id number]           Patient Information     Date Of Birth          1990        Visit Information        Provider Department      2/14/2018 10:30 AM Aaron Parrish AuD; [facility]SL [facility]        Today's Diagnoses     Sensorineural hearing loss, bilateral    -  1       Follow-ups after your visit        Your next 10 appointments already scheduled     Feb 20, 2018 10:30 AM CST   Office Visit with Aura Rosario PA-C   [facility] ([facility]    [address]  [facility] [place] [id number] [phone]           Bring a current list of meds and any records pertaining to this visit. For Physicals, please bring immunization records and any forms needing to be filled out. Please arrive 10 minutes early to complete paperwork.              Who to contact     If you have questions or need follow up information about today's clinic visit or your schedule please contact [facility] directly at [phone].  Normal or non-critical lab and imaging results will be communicated to you by MyChart, letter or phone within 4 business days after the clinic has received the results. If you do not hear from us within 7 days, please contact the clinic through [employer] or phone. If you have a critical or abnormal lab result, we will notify you by phone as soon as possible.  Submit refill requests through [employer] or call your pharmacy and they will forward the refill request to us. Please allow 3 business days for your refill to be completed.          Additional Information About Your Visit        MyChart Information     [employer] gives you secure access to your electronic health record. If you see a primary care provider, you can also send messages to your care team and make appointments. If you have questions, please call your primary care clinic.  If you do not have a primary  care provider, please call [phone] and they will assist you.        Care EveryWhere ID     This is your Care EveryWhere ID. This could be used by other organizations to access your [place] medical records  [id number]         Blood Pressure from Last 3 Encounters:   02/08/18 118/82   01/29/18 108/78   01/25/18 119/83    Weight from Last 3 Encounters:   02/08/18 148 lb 12.8 oz (67.5 kg)   01/29/18 148 lb 3.2 oz (67.2 kg)   01/25/18 147 lb 0.8 oz (66.7 kg)              We Performed the Following     HEARING AID CHECK, BINAURAL        Primary Care Provider Office Phone # Fax #    Aura EMILY Rosario PA-C [phone]       [address]  [facility] [address]        Equal Access to Services     [facility]: Hadii aad ku hadasho Soomaali, waaxda luqadaha, qaybta kaalmada adeegyada, waxay calebin hayaan norberto santo . So [facility] [phone].    ATENCIÓN: Si habla español, tiene a kerr disposición servicios gratuitos de asistencia lingüística. Ruyame al [phone].    We comply with applicable federal civil rights laws and [place] laws. We do not discriminate on the basis of race, color, national origin, age, disability, sex, sexual orientation, or gender identity.            Thank you!     Thank you for choosing [facility]  for your care. Our goal is always to provide you with excellent care. Hearing back from our patients is one way we can continue to improve our services. Please take a few minutes to complete the written survey that you may receive in the mail after your visit with us. Thank you!             Your Updated Medication List - Protect others around you: Learn how to safely use, store and throw away your medicines at www.disposemymeds.org.          This list is accurate as of 2/14/18 12:30 PM.  Always use your most recent med list.                   Brand Name Dispense Instructions for use Diagnosis    ARIPiprazole 15 MG tablet    ABILIFY    30  tablet    Take 1 tablet (15 mg) by mouth At Bedtime    Adjustment disorder with mixed anxiety and depressed mood, Anxiety       cetirizine 10 MG tablet    zyrTEC     Take 10 mg by mouth daily        cyclobenzaprine 10 MG tablet    FLEXERIL    60 tablet    Take 2 tablets (20 mg) by mouth nightly as needed for muscle spasms or other (back pain)    Chronic bilateral low back pain with left-sided sciatica       dexlansoprazole 60 MG Cpdr CR capsule    DEXILANT    30 capsule    Take 1 capsule (60 mg) by mouth daily    Gastroesophageal reflux disease without esophagitis       diazepam 5 MG tablet    VALIUM    60 tablet    Take 1-2 tablets (5-10 mg) by mouth every 12 hours as needed for anxiety or sleep (MUSCLE SPASM) (one month supply)    Chronic pain syndrome, Chronic bilateral low back pain with left-sided sciatica, Lumbar disc disease with radiculopathy, S/P lumbar fusion       dicyclomine 20 MG tablet    BENTYL    30 tablet    Take 1 tablet (20 mg) by mouth 4 times daily as needed (ABDOMINAL CRAMPING) AS NEEDED FOR CRAMPING    Hx of Crohn's disease       DULoxetine 60 MG EC capsule    CYMBALTA    90 capsule    Take 1 capsule (60 mg) by mouth daily    Adjustment disorder with mixed anxiety and depressed mood       EPINEPHrine 0.3 MG/0.3ML injection 2-pack    EPIPEN/ADRENACLICK/or ANY BX GENERIC EQUIV    0.6 mL    Inject 0.3 mLs (0.3 mg) into the muscle once as needed for anaphylaxis    Bee sting allergy       gabapentin 300 MG capsule    NEURONTIN    90 capsule    Take 1 capsule (300 mg) by mouth 3 times daily    Chronic bilateral low back pain with left-sided sciatica       meclizine 12.5 MG tablet    ANTIVERT    30 tablet    Take 1 tablet (12.5 mg) by mouth 4 times daily as needed for dizziness    Dizziness       medical cannabis inhalation (Patient's own supply.  Not a prescription)      Inhale into the lungs 3 times daily as needed Reported on 3/28/2017        medroxyPROGESTERone 150 MG/ML injection    DEPO-PROVERA     3 mL    Inject 1 mL (150 mg) into the muscle every 3 months    Encounter for surveillance of injectable contraceptive, Dysmenorrhea       naloxone nasal spray    NARCAN    0.2 mL    Spray 1 spray (4 mg) into one nostril alternating nostrils as needed for opioid reversal every 2-3 minutes until assistance arrives    Chronic pain syndrome, Chronic bilateral low back pain with left-sided sciatica, S/P lumbar fusion, Lumbar disc disease with radiculopathy       NONFORMULARY      Apply 30 mLs topically 4 times daily        oxyCODONE IR 10 MG tablet    ROXICODONE    112 tablet    Take 1 tablet (10 mg) by mouth every 4 hours as needed for moderate to severe pain (Max 8/day)    Chronic bilateral low back pain with left-sided sciatica       polyethylene glycol Packet    MIRALAX/GLYCOLAX     Take 1 packet by mouth daily        promethazine 6.25 MG/5ML syrup    PHENERGAN    560 mL    Take 10 mLs (12.5 mg) by mouth 4 times daily as needed for nausea    Nausea       rizatriptan 5 MG ODT tab    MAXALT-MLT    18 tablet    Take 1-2 tablets (5-10 mg) by mouth at onset of headache for migraine May repeat in 2 hours. Max 6 tablets/24 hours.    Migraine with aura and without status migrainosus, not intractable

## 2018-02-14 NOTE — TELEPHONE ENCOUNTER
Katy Islas is a 27 year old female who presents with headache and nose irritation.    NURSING ASSESSMENT:  Description:  Patient is having a global headache that is throbbing and concerned about a scab in her nose. Took Maxalt yesterday for headache without relief, just made her sleep. RN loosed with light and able to see redness that looked irritated in the right nostril. Stated she feels like it is scabbing over. Requesting Valtrex. Discussed Valtrex and what it is prescribed for. Recommended home care measures for nasal irritation and headache.   Onset/duration:  A few days   Precip. factors:  none  Associated symptoms:  irritation in the nose and headache  Improves/worsens symptoms:  Same   Pain scale (0-10)   mild  LMP/preg/breast feeding:  No LMP recorded. Patient has had an injection.  Last exam/Treatment:  02/08/2018  Allergies:   Allergies   Allergen Reactions     Ativan [Lorazepam] Hives     At the IV site     Baclofen      hives     Bees Hives, Swelling and Difficulty breathing     Caffeine      Contrast Dye      Hives,   Updated 5/10/2016 CT Contrast.     Diazepam      IV form makes her become aggressive and angry     Iodine Hives     Methocarbamol Swelling     Metoclopramide      Other reaction(s): Tremors  LW Reaction: shaking/sweating     Midazolam      Other reaction(s): Agitation     Monosodium Glutamate      Morphine Other (See Comments)     Difficulty with urination     Nsaids Other (See Comments)     GI BLEED x2     Other (Do Not Use) Other (See Comments)     Xanaflex- pt becomes disoriented and loses bladder control     Percocet [Oxycodone-Acetaminophen] Itching     Reglan [Metoclopramide Hcl] Other (See Comments)     shaking     Soma [Carisoprodol] Visual Disturbance     Sleep walking     Tizanidine      Topamax Other (See Comments)     Topamax [Topiramate] Nausea     Tingling  GI/Vomit     Tramadol      Severe Headache, Seizure Risk     Tylenol W/Codeine [Acetaminophen-Codeine] Nausea  and Itching     Tylenol 3     Valium Other (See Comments)     Becomes aggressive and angry     Versed Other (See Comments)     Zolmitriptan      Makes face feel like its twitching     Droperidol Anxiety     Flu Virus Vaccine Rash      Arm swelling     NURSING PLAN: Nursing advice to patient to try home care measures    RECOMMENDED DISPOSITION:  Home care advice - for nose irritation and headache  Will comply with recommendation: Yes  If further questions/concerns or if symptoms do not improve, worsen or new symptoms develop, call your PCP or [facility] as soon as possible.    NOTES:  Disposition was determined by the first positive assessment question, therefore all previous assessment questions were negative    Guideline used:  Nursing Judgement    Nohemi Jennings, RN, BSN

## 2018-02-14 NOTE — PROGRESS NOTES
AUDIOLOGY REPORT: HEARING AID CHECK    SUBJECTIVE:  Katy Islas is a 27 year old female who was seen in the audiology clinic for a hearing aid check. The patient reports lack of significant benefit from her hearing aids (too quiet), and possible feedback. Patient is also curious about whether she would do better with a different brand of hearing aid.    OBJECTIVE:    Otoscopy:   RIGHT:  clear ear canal   LEFT:   non-occluding cerumen     Biologic listening check and visual inspection revealed unremarkable hearing aid performance and no evidence of feedback (internal, cracked tubing, or poor seal with molds in ears).    Connected hearing aids to [employer] and increased target gain to 110% and activated bass boost feature for both hearing aids; patient still reported soft volume. Increased MPO and overall gain 1 double step for the left hearing aid, and 2 double steps for the right hearing aid; patient reported improved sound quality and balance.    Discussed current hearing aids versus different manufacturers relative to patient's profound bilateral hearing loss. Suggested patient visit [facility] in [place] to see if they could achieve better fit. Patient would like to investigate opportunities with [facility]; completed [facility] referral form.    ASSESSMENT:  Bilateral sensorineural hearing loss    Patient satisfied with current hearing aid performance.    PLAN:  It is recommended that the patient return as needed.    Please call this clinic with questions regarding these results or recommendations.    Amira Brown.  Licensed Audiologist, [place] #[id number]  [facility]  2/14/2018

## 2018-02-15 ENCOUNTER — TELEPHONE (OUTPATIENT)
Dept: FAMILY MEDICINE | Facility: OTHER | Age: 28
End: 2018-02-15

## 2018-02-15 DIAGNOSIS — H91.93 BILATERAL HEARING LOSS, UNSPECIFIED HEARING LOSS TYPE: ICD-10-CM

## 2018-02-15 DIAGNOSIS — Z78.9: Primary | ICD-10-CM

## 2018-02-17 ENCOUNTER — MYC MEDICAL ADVICE (OUTPATIENT)
Dept: FAMILY MEDICINE | Facility: OTHER | Age: 28
End: 2018-02-17

## 2018-02-17 DIAGNOSIS — G89.29 CHRONIC BILATERAL LOW BACK PAIN WITH LEFT-SIDED SCIATICA: ICD-10-CM

## 2018-02-17 DIAGNOSIS — M54.42 CHRONIC BILATERAL LOW BACK PAIN WITH LEFT-SIDED SCIATICA: ICD-10-CM

## 2018-02-19 ENCOUNTER — TELEPHONE (OUTPATIENT)
Dept: FAMILY MEDICINE | Facility: OTHER | Age: 28
End: 2018-02-19

## 2018-02-19 NOTE — TELEPHONE ENCOUNTER
Looks like she was given 2/8 and 3 refills. So has refills but may be restricted by insurance for coverage and we cannot help with that. Important to keep meds up and locked to reduce accidents.  Isabel Cedillo MD

## 2018-02-19 NOTE — TELEPHONE ENCOUNTER
Reason for Call:  Medication or medication refill:    Do you use a [facility]?  Name of the pharmacy and phone number for the current request:  [facility] [place] [phone]    Name of the medication requested: early refill of diazapam    Other request: patient requesting early refill.   The pharmacy told patient to call clinic also. Insurance wont let it go through until 24 th and bonnie policy would be the 25 th. Call pharmacy.      Can we leave a detailed message on this number? YES    Phone number patient can be reached at: Home number on file [phone] (home)    Best Time: any    Call taken on 2/19/2018 at 12:17 PM by Kenzie Maria

## 2018-02-20 ENCOUNTER — HOSPITAL ENCOUNTER (EMERGENCY)
Facility: CLINIC | Age: 28
Discharge: HOME | End: 2018-02-20
Attending: PHYSICIAN ASSISTANT | Admitting: PHYSICIAN ASSISTANT
Payer: MEDICARE

## 2018-02-20 ENCOUNTER — OFFICE VISIT (OUTPATIENT)
Dept: FAMILY MEDICINE | Facility: OTHER | Age: 28
End: 2018-02-20
Payer: MEDICARE

## 2018-02-20 VITALS
TEMPERATURE: 98.5 F | WEIGHT: 142.5 LBS | OXYGEN SATURATION: 100 % | SYSTOLIC BLOOD PRESSURE: 110 MMHG | HEART RATE: 124 BPM | DIASTOLIC BLOOD PRESSURE: 82 MMHG | BODY MASS INDEX: 26.56 KG/M2 | RESPIRATION RATE: 14 BRPM

## 2018-02-20 VITALS
OXYGEN SATURATION: 100 % | DIASTOLIC BLOOD PRESSURE: 93 MMHG | HEART RATE: 99 BPM | RESPIRATION RATE: 16 BRPM | SYSTOLIC BLOOD PRESSURE: 129 MMHG | TEMPERATURE: 97.2 F

## 2018-02-20 DIAGNOSIS — M54.42 CHRONIC BILATERAL LOW BACK PAIN WITH LEFT-SIDED SCIATICA: ICD-10-CM

## 2018-02-20 DIAGNOSIS — F32.0 MILD MAJOR DEPRESSION (H): Primary | ICD-10-CM

## 2018-02-20 DIAGNOSIS — G89.18 POSTOPERATIVE PAIN: ICD-10-CM

## 2018-02-20 DIAGNOSIS — Z98.1 S/P LUMBAR FUSION: ICD-10-CM

## 2018-02-20 DIAGNOSIS — R19.7 VOMITING AND DIARRHEA: ICD-10-CM

## 2018-02-20 DIAGNOSIS — F41.9 ANXIETY: ICD-10-CM

## 2018-02-20 DIAGNOSIS — R10.84 ABDOMINAL PAIN, GENERALIZED: ICD-10-CM

## 2018-02-20 DIAGNOSIS — F31.63 BIPOLAR DISORDER, CURRENT EPISODE MIXED, SEVERE, WITHOUT PSYCHOTIC FEATURES (H): ICD-10-CM

## 2018-02-20 DIAGNOSIS — R11.10 VOMITING AND DIARRHEA: ICD-10-CM

## 2018-02-20 DIAGNOSIS — G89.29 CHRONIC BILATERAL LOW BACK PAIN WITH LEFT-SIDED SCIATICA: ICD-10-CM

## 2018-02-20 DIAGNOSIS — G89.4 CHRONIC PAIN SYNDROME: ICD-10-CM

## 2018-02-20 LAB
ALBUMIN SERPL-MCNC: 4.2 G/DL (ref 3.4–5)
ALP SERPL-CCNC: 100 U/L (ref 40–150)
ALT SERPL W P-5'-P-CCNC: 22 U/L (ref 0–50)
ANION GAP SERPL CALCULATED.3IONS-SCNC: 8 MMOL/L (ref 3–14)
AST SERPL W P-5'-P-CCNC: 13 U/L (ref 0–45)
BASOPHILS # BLD AUTO: 0 10E9/L (ref 0–0.2)
BASOPHILS NFR BLD AUTO: 0.2 %
BILIRUB SERPL-MCNC: 0.6 MG/DL (ref 0.2–1.3)
BUN SERPL-MCNC: 13 MG/DL (ref 7–30)
CALCIUM SERPL-MCNC: 9.2 MG/DL (ref 8.5–10.1)
CHLORIDE SERPL-SCNC: 108 MMOL/L (ref 94–109)
CO2 SERPL-SCNC: 23 MMOL/L (ref 20–32)
CREAT SERPL-MCNC: 0.76 MG/DL (ref 0.52–1.04)
CRP SERPL-MCNC: <2.9 MG/L (ref 0–8)
DIFFERENTIAL METHOD BLD: ABNORMAL
EOSINOPHIL # BLD AUTO: 0 10E9/L (ref 0–0.7)
EOSINOPHIL NFR BLD AUTO: 0 %
ERYTHROCYTE [DISTWIDTH] IN BLOOD BY AUTOMATED COUNT: 12.8 % (ref 10–15)
ERYTHROCYTE [SEDIMENTATION RATE] IN BLOOD BY WESTERGREN METHOD: 10 MM/H (ref 0–20)
GFR SERPL CREATININE-BSD FRML MDRD: 90 ML/MIN/1.7M2
GLUCOSE SERPL-MCNC: 103 MG/DL (ref 70–99)
HCT VFR BLD AUTO: 43.5 % (ref 35–47)
HGB BLD-MCNC: 15 G/DL (ref 11.7–15.7)
LACTATE BLD-SCNC: 1.1 MMOL/L (ref 0.7–2)
LYMPHOCYTES # BLD AUTO: 0.5 10E9/L (ref 0.8–5.3)
LYMPHOCYTES NFR BLD AUTO: 8.4 %
MCH RBC QN AUTO: 32.9 PG (ref 26.5–33)
MCHC RBC AUTO-ENTMCNC: 34.5 G/DL (ref 31.5–36.5)
MCV RBC AUTO: 95 FL (ref 78–100)
MONOCYTES # BLD AUTO: 0.2 10E9/L (ref 0–1.3)
MONOCYTES NFR BLD AUTO: 3.5 %
NEUTROPHILS # BLD AUTO: 5.5 10E9/L (ref 1.6–8.3)
NEUTROPHILS NFR BLD AUTO: 87.9 %
PLATELET # BLD AUTO: 281 10E9/L (ref 150–450)
POTASSIUM SERPL-SCNC: 4.2 MMOL/L (ref 3.4–5.3)
PROT SERPL-MCNC: 8.2 G/DL (ref 6.8–8.8)
RBC # BLD AUTO: 4.56 10E12/L (ref 3.8–5.2)
SODIUM SERPL-SCNC: 139 MMOL/L (ref 133–144)
TROPONIN I SERPL-MCNC: <0.015 UG/L (ref 0–0.04)
TSH SERPL DL<=0.005 MIU/L-ACNC: 0.92 MU/L (ref 0.4–4)
WBC # BLD AUTO: 6.2 10E9/L (ref 4–11)

## 2018-02-20 PROCEDURE — 25000128 H RX IP 250 OP 636: Performed by: PHYSICIAN ASSISTANT

## 2018-02-20 PROCEDURE — 99284 EMERGENCY DEPT VISIT MOD MDM: CPT | Mod: Z6 | Performed by: PHYSICIAN ASSISTANT

## 2018-02-20 PROCEDURE — 86140 C-REACTIVE PROTEIN: CPT | Performed by: PHYSICIAN ASSISTANT

## 2018-02-20 PROCEDURE — 85025 COMPLETE CBC W/AUTO DIFF WBC: CPT | Performed by: PHYSICIAN ASSISTANT

## 2018-02-20 PROCEDURE — 99284 EMERGENCY DEPT VISIT MOD MDM: CPT | Mod: 25 | Performed by: PHYSICIAN ASSISTANT

## 2018-02-20 PROCEDURE — 99214 OFFICE O/P EST MOD 30 MIN: CPT | Performed by: PHYSICIAN ASSISTANT

## 2018-02-20 PROCEDURE — 96374 THER/PROPH/DIAG INJ IV PUSH: CPT | Performed by: PHYSICIAN ASSISTANT

## 2018-02-20 PROCEDURE — 84443 ASSAY THYROID STIM HORMONE: CPT | Performed by: PHYSICIAN ASSISTANT

## 2018-02-20 PROCEDURE — 85652 RBC SED RATE AUTOMATED: CPT | Performed by: PHYSICIAN ASSISTANT

## 2018-02-20 PROCEDURE — 83605 ASSAY OF LACTIC ACID: CPT | Performed by: PHYSICIAN ASSISTANT

## 2018-02-20 PROCEDURE — 84484 ASSAY OF TROPONIN QUANT: CPT | Performed by: PHYSICIAN ASSISTANT

## 2018-02-20 PROCEDURE — 96361 HYDRATE IV INFUSION ADD-ON: CPT | Performed by: PHYSICIAN ASSISTANT

## 2018-02-20 PROCEDURE — 80053 COMPREHEN METABOLIC PANEL: CPT | Performed by: PHYSICIAN ASSISTANT

## 2018-02-20 RX ORDER — PROMETHAZINE HYDROCHLORIDE 25 MG/ML
50 INJECTION, SOLUTION INTRAMUSCULAR; INTRAVENOUS ONCE
Status: COMPLETED | OUTPATIENT
Start: 2018-02-20 | End: 2018-02-20

## 2018-02-20 RX ORDER — OXYCODONE HYDROCHLORIDE 10 MG/1
10 TABLET ORAL EVERY 4 HOURS PRN
Qty: 70 TABLET | Refills: 0 | Status: SHIPPED | OUTPATIENT
Start: 2018-02-22 | End: 2018-03-12

## 2018-02-20 RX ORDER — CLONIDINE HYDROCHLORIDE 0.1 MG/1
0.1 TABLET ORAL 2 TIMES DAILY
Qty: 30 TABLET | Refills: 1 | Status: SHIPPED | OUTPATIENT
Start: 2018-02-20 | End: 2018-03-12

## 2018-02-20 RX ADMIN — SODIUM CHLORIDE 1000 ML: 9 INJECTION, SOLUTION INTRAVENOUS at 14:58

## 2018-02-20 RX ADMIN — PROMETHAZINE HYDROCHLORIDE 50 MG: 25 INJECTION INTRAMUSCULAR; INTRAVENOUS at 13:56

## 2018-02-20 RX ADMIN — SODIUM CHLORIDE 1000 ML: 9 INJECTION, SOLUTION INTRAVENOUS at 13:56

## 2018-02-20 ASSESSMENT — ENCOUNTER SYMPTOMS
SHORTNESS OF BREATH: 0
BLOOD IN STOOL: 1
DYSURIA: 0
ABDOMINAL PAIN: 1
BACK PAIN: 1
NAUSEA: 1
VOMITING: 1
PALPITATIONS: 1
FEVER: 0
LIGHT-HEADEDNESS: 0
DIARRHEA: 1

## 2018-02-20 ASSESSMENT — PATIENT HEALTH QUESTIONNAIRE - PHQ9
SUM OF ALL RESPONSES TO PHQ QUESTIONS 1-9: 2
SUM OF ALL RESPONSES TO PHQ QUESTIONS 1-9: 2
10. IF YOU CHECKED OFF ANY PROBLEMS, HOW DIFFICULT HAVE THESE PROBLEMS MADE IT FOR YOU TO DO YOUR WORK, TAKE CARE OF THINGS AT HOME, OR GET ALONG WITH OTHER PEOPLE: NOT DIFFICULT AT ALL

## 2018-02-20 ASSESSMENT — ANXIETY QUESTIONNAIRES
GAD7 TOTAL SCORE: 3
1. FEELING NERVOUS, ANXIOUS, OR ON EDGE: SEVERAL DAYS
4. TROUBLE RELAXING: SEVERAL DAYS
GAD7 TOTAL SCORE: 3
2. NOT BEING ABLE TO STOP OR CONTROL WORRYING: SEVERAL DAYS
6. BECOMING EASILY ANNOYED OR IRRITABLE: NOT AT ALL
3. WORRYING TOO MUCH ABOUT DIFFERENT THINGS: NOT AT ALL
5. BEING SO RESTLESS THAT IT IS HARD TO SIT STILL: NOT AT ALL
GAD7 TOTAL SCORE: 3
7. FEELING AFRAID AS IF SOMETHING AWFUL MIGHT HAPPEN: NOT AT ALL
7. FEELING AFRAID AS IF SOMETHING AWFUL MIGHT HAPPEN: NOT AT ALL

## 2018-02-20 ASSESSMENT — PAIN SCALES - GENERAL: PAINLEVEL: MODERATE PAIN (4)

## 2018-02-20 NOTE — ED NOTES
"PIETER Barton PA in to do rectal exam for occult test. Pt does not want done. States will save some stool if she goes. Now states she only had \" a little pink\" in stool  "

## 2018-02-20 NOTE — DISCHARGE INSTRUCTIONS
Nonspecific Vomiting and Diarrhea (Adult)  Vomiting and diarrhea can have many causes, including:    Helping your body get rid of harmful substances     Gastroenteritis caused by viruses, parasites, bacteria, or toxins.    Allergy to or side effect of a food or medicine    Severe stress or worry (anxiety)     Other illnesses    Pregnancy  It is often hard to pinpoint an exact cause, even with testing. Vomiting and diarrhea often go away within a day or two without problems. If they continue, though, they can lead to too much loss of fluid (dehydration). This can be serious if not treated.    Home care  Medications    You may use acetaminophen or NSAID medicines like ibuprofen or naproxen to control fever, unless another medicine was prescribed. If you have chronic liver or kidney disease, talk with your healthcare provider before using these medicines. Also talk with your provider if you've had a stomach ulcer or gastrointestinal bleeding. Don't give aspirin to anyone under 18 years of age who is ill with a fever. Don't use NSAID medicines if you are already taking one for another condition (like arthritis) or are on aspirin (such as for heart disease or after a stroke)    If medicines for diarrhea or vomiting were prescribed, take these only as directed. Never take these without a healthcare provider s approval.  General care    If symptoms are severe, rest at home for the next 24 hours, or until you are feeling better.    Washing your hands with soap and water, or using alcohol-based hand  is the best way to stop the spread of infection. Wash your hands after touching anyone who is sick.    Wash your hands after using the toilet and before meals. Clean the toilet after each use.    Caffeine, tobacco, and alcohol can make the diarrhea, cramping, and pain worse. Remember, caffeine not only is in coffee, but also is in chocolate, some energy drinks, and teas.  Diet    Water and clear liquids are important so  you don't get dehydrated. Drink a small amount at a time. Don't guzzle down the drinks. That may increase your nausea, make cramping worse, and cause the drinks to come back up.    Sports drinks may also help. Make sure they are not too sugary, because this can sometimes make things worse. Also, don't drink beverages that are too acidic, like orange juice and grape juice.    If you are very dehydrated, sports drinks aren't a good choice. They have too much sugar and not enough electrolytes. In this case, commercially available products called oral rehydration solutions are best.  Food    Don't force yourself to eat, especially if you have cramps, diarrhea, or vomiting. Eat just a little at a time, and then wait a few minutes before you try to eat more.    Don't eat fatty, greasy, spicy, or fried foods.    Don't eat dairy products if you have diarrhea. They can make it worse.  During the first 24 hours (the first full day), follow the diet below:    Beverages: Sports drinks, soft drinks without caffeine, mineral water, and decaffeinated tea and coffee    Soups: Clear broth, consommé, and bouillon    Desserts: Plain gelatin, popsicles, and fruit juice bars  During the next 24 hours (the second day), you may add the following to the above if you are better. If not, continue what you did the first day:    Hot cereal, plain toast, bread, rolls, crackers    Plain noodles, rice, mashed potatoes, chicken noodle or rice soup    Unsweetened canned fruit (avoid pineapple), bananas    Limit fat intake to less than 15 grams per day by avoiding margarine, butter, oils, mayonnaise, sauces, gravies, fried foods, peanut butter, meat, poultry, and fish.    Limit fiber. Avoid raw or cooked vegetables, fresh fruits (except bananas) and bran cereals.    Limit caffeine and chocolate. No spices or seasonings except salt.  During the next 24 hours:    Gradually resume a normal diet, as you feel better and your symptoms improve.    If at  any time your symptoms start getting worse again, go back to clear liquids until you feel better.  Food preparation    If you have diarrhea, you should not prepare food for others. When preparing foods, wash your hands before and after.    Wash your hands or use alcohol-based  after using cutting boards, countertops, and knives that have been in contact with raw food.    Keep uncooked meats away from cooked and ready-to-eat foods.  Follow-up care  Follow up with your healthcare advisor, or as advised. Call if you do not get better in the next 2 to 3 days. If a stool (diarrhea) sample was taken, or cultures done, you will be told if they are positive, or if your treatment needs to be changed. You may call as directed for the results.  If X-rays were taken, and a radiologist has not yet looked at them, he or she will do so. You will be told if there is a change in the reading, especially if it affects your treatment.  Call 911  Call 911 if any of these occur:    Trouble breathing    Chest pain    Confusion    Severe drowsiness or trouble awakening    Fainting or loss of consciousness    Rapid heart rate    Seizure    Stiff neck    Severe weakness, dizziness, or lightheadedness  When to seek medical advice  Call your healthcare provider right away if any of these occur:    Bloody or black vomit or stools.    Severe, steady abdominal pain or any abdominal pain that is getting worse.    Severe headache or stiff neck    An inability to hold down even sips of liquids for more than 12 hours.    Vomiting that lasts more than 24 hours.    Diarrhea that lasts more than 24 hours.    Fever of 100.4 F (38.0 C) or higher that lasts more than 48 hours, or as directed by your health care provider    Yellowish color to your skin or the whites of your eyes.    Signs of dehydration, such as dry mouth, little urine (less than every 6 hours), or very dark urine.

## 2018-02-20 NOTE — ED NOTES
Here with abd pain that started this AM. Noted to have blood in stool. She also states her heart has been beating fast for a few days. Currently heart rate is 142.

## 2018-02-20 NOTE — ED AVS SNAPSHOT
[facility]    [address] [place] [address]    Phone:  [phone]    Fax:  [phone]                                       Katy Islas   MRN: [id number]    Department:  [facility]   Date of Visit:  2/20/2018           After Visit Summary Signature Page     I have received my discharge instructions, and my questions have been answered. I have discussed any challenges I see with this plan with the nurse or doctor.    ..........................................................................................................................................  Patient/Patient Representative Signature      ..........................................................................................................................................  Patient Representative Print Name and Relationship to Patient    ..................................................               ................................................  Date                                            Time    ..........................................................................................................................................  Reviewed by Signature/Title    ...................................................              ..............................................  Date                                                            Time

## 2018-02-20 NOTE — ED AVS SNAPSHOT
[facility] Emergency Department    [address] [place] [address]    Phone:  [phone]    Fax:  [phone]                                       Katy Islas   MRN: [id number]    Department:  [facility] Emergency Department   Date of Visit:  2/20/2018           Patient Information     Date Of Birth          1990        Your diagnoses for this visit were:     Vomiting and diarrhea     Abdominal pain, generalized        You were seen by Marian Barton PA-C.      Follow-up Information     Follow up with Aura Rosario PA-C In 2 days.    Specialty:  Physician Assistant - Medical    Why:  As needed for persistent symptoms    Contact information:    [address]  [place] [address]  [phone]          Follow up with [facility] Emergency Department.    Specialty:  EMERGENCY MEDICINE    Why:  If symptoms worsen    Contact information:    [address] [place] [address] [phone]    Additional information:    From [address]: Exit at [address] on [place] side of [place]. Turn right on [address]. Turn left at stoplight on [address]. [facility] will be in view two blocks ahead        Discharge Instructions       Nonspecific Vomiting and Diarrhea (Adult)  Vomiting and diarrhea can have many causes, including:    Helping your body get rid of harmful substances     Gastroenteritis caused by viruses, parasites, bacteria, or toxins.    Allergy to or side effect of a food or medicine    Severe stress or worry (anxiety)     Other illnesses    Pregnancy  It is often hard to pinpoint an exact cause, even with testing. Vomiting and diarrhea often go away within a day or two without problems. If they continue, though, they can lead to too much loss of fluid (dehydration). This can be serious if not treated.    Home care  Medications    You may use acetaminophen or NSAID medicines like ibuprofen or naproxen  to control fever, unless another medicine was prescribed. If you have chronic liver or kidney disease, talk with your healthcare provider before using these medicines. Also talk with your provider if you've had a stomach ulcer or gastrointestinal bleeding. Don't give aspirin to anyone under 18 years of age who is ill with a fever. Don't use NSAID medicines if you are already taking one for another condition (like arthritis) or are on aspirin (such as for heart disease or after a stroke)    If medicines for diarrhea or vomiting were prescribed, take these only as directed. Never take these without a healthcare provider s approval.  General care    If symptoms are severe, rest at home for the next 24 hours, or until you are feeling better.    Washing your hands with soap and water, or using alcohol-based hand  is the best way to stop the spread of infection. Wash your hands after touching anyone who is sick.    Wash your hands after using the toilet and before meals. Clean the toilet after each use.    Caffeine, tobacco, and alcohol can make the diarrhea, cramping, and pain worse. Remember, caffeine not only is in coffee, but also is in chocolate, some energy drinks, and teas.  Diet    Water and clear liquids are important so you don't get dehydrated. Drink a small amount at a time. Don't guzzle down the drinks. That may increase your nausea, make cramping worse, and cause the drinks to come back up.    Sports drinks may also help. Make sure they are not too sugary, because this can sometimes make things worse. Also, don't drink beverages that are too acidic, like orange juice and grape juice.    If you are very dehydrated, sports drinks aren't a good choice. They have too much sugar and not enough electrolytes. In this case, commercially available products called oral rehydration solutions are best.  Food    Don't force yourself to eat, especially if you have cramps, diarrhea, or vomiting. Eat just a little  at a time, and then wait a few minutes before you try to eat more.    Don't eat fatty, greasy, spicy, or fried foods.    Don't eat dairy products if you have diarrhea. They can make it worse.  During the first 24 hours (the first full day), follow the diet below:    Beverages: Sports drinks, soft drinks without caffeine, mineral water, and decaffeinated tea and coffee    Soups: Clear broth, consommé, and bouillon    Desserts: Plain gelatin, popsicles, and fruit juice bars  During the next 24 hours (the second day), you may add the following to the above if you are better. If not, continue what you did the first day:    Hot cereal, plain toast, bread, rolls, crackers    Plain noodles, rice, mashed potatoes, chicken noodle or rice soup    Unsweetened canned fruit (avoid pineapple), bananas    Limit fat intake to less than 15 grams per day by avoiding margarine, butter, oils, mayonnaise, sauces, gravies, fried foods, peanut butter, meat, poultry, and fish.    Limit fiber. Avoid raw or cooked vegetables, fresh fruits (except bananas) and bran cereals.    Limit caffeine and chocolate. No spices or seasonings except salt.  During the next 24 hours:    Gradually resume a normal diet, as you feel better and your symptoms improve.    If at any time your symptoms start getting worse again, go back to clear liquids until you feel better.  Food preparation    If you have diarrhea, you should not prepare food for others. When preparing foods, wash your hands before and after.    Wash your hands or use alcohol-based  after using cutting boards, countertops, and knives that have been in contact with raw food.    Keep uncooked meats away from cooked and ready-to-eat foods.  Follow-up care  Follow up with your healthcare advisor, or as advised. Call if you do not get better in the next 2 to 3 days. If a stool (diarrhea) sample was taken, or cultures done, you will be told if they are positive, or if your treatment needs to  be changed. You may call as directed for the results.  If X-rays were taken, and a radiologist has not yet looked at them, he or she will do so. You will be told if there is a change in the reading, especially if it affects your treatment.  Call 911  Call 911 if any of these occur:    Trouble breathing    Chest pain    Confusion    Severe drowsiness or trouble awakening    Fainting or loss of consciousness    Rapid heart rate    Seizure    Stiff neck    Severe weakness, dizziness, or lightheadedness  When to seek medical advice  Call your healthcare provider right away if any of these occur:    Bloody or black vomit or stools.    Severe, steady abdominal pain or any abdominal pain that is getting worse.    Severe headache or stiff neck    An inability to hold down even sips of liquids for more than 12 hours.    Vomiting that lasts more than 24 hours.    Diarrhea that lasts more than 24 hours.    Fever of 100.4 F (38.0 C) or higher that lasts more than 48 hours, or as directed by your health care provider    Yellowish color to your skin or the whites of your eyes.    Signs of dehydration, such as dry mouth, little urine (less than every 6 hours), or very dark urine.      24 Hour Appointment Hotline       To make an appointment at any [facility], call [phone] ([phone]). If you don't have a family doctor or clinic, we will help you find one. [facility] are conveniently located to serve the needs of you and your family.             Review of your medicines      Our records show that you are taking the medicines listed below. If these are incorrect, please call your family doctor or clinic.        Dose / Directions Last dose taken    ARIPiprazole 15 MG tablet   Commonly known as:  ABILIFY   Dose:  15 mg   Quantity:  30 tablet        Take 1 tablet (15 mg) by mouth At Bedtime   Refills:  3        cetirizine 10 MG tablet   Commonly known as:  zyrTEC   Dose:  10 mg        Take 10 mg by mouth daily    Refills:  0        cloNIDine 0.1 MG tablet   Commonly known as:  CATAPRES   Dose:  0.1 mg   Quantity:  30 tablet        Take 1 tablet (0.1 mg) by mouth 2 times daily   Refills:  1        cyclobenzaprine 10 MG tablet   Commonly known as:  FLEXERIL   Dose:  20 mg   Quantity:  60 tablet        Take 2 tablets (20 mg) by mouth nightly as needed for muscle spasms or other (back pain)   Refills:  3        dexlansoprazole 60 MG Cpdr CR capsule   Commonly known as:  DEXILANT   Dose:  60 mg   Quantity:  30 capsule        Take 1 capsule (60 mg) by mouth daily   Refills:  3        diazepam 5 MG tablet   Commonly known as:  VALIUM   Dose:  5-10 mg   Quantity:  60 tablet        Take 1-2 tablets (5-10 mg) by mouth every 12 hours as needed for anxiety or sleep (MUSCLE SPASM) (one month supply)   Refills:  2        dicyclomine 20 MG tablet   Commonly known as:  BENTYL   Dose:  20 mg   Quantity:  30 tablet        Take 1 tablet (20 mg) by mouth 4 times daily as needed (ABDOMINAL CRAMPING) AS NEEDED FOR CRAMPING   Refills:  3        DULoxetine 60 MG EC capsule   Commonly known as:  CYMBALTA   Dose:  60 mg   Quantity:  90 capsule        Take 1 capsule (60 mg) by mouth daily   Refills:  3        EPINEPHrine 0.3 MG/0.3ML injection 2-pack   Commonly known as:  EPIPEN/ADRENACLICK/or ANY BX GENERIC EQUIV   Dose:  0.3 mg   Quantity:  0.6 mL        Inject 0.3 mLs (0.3 mg) into the muscle once as needed for anaphylaxis   Refills:  1        gabapentin 300 MG capsule   Commonly known as:  NEURONTIN   Dose:  300 mg   Quantity:  90 capsule        Take 1 capsule (300 mg) by mouth 3 times daily   Refills:  3        meclizine 12.5 MG tablet   Commonly known as:  ANTIVERT   Dose:  12.5 mg   Quantity:  30 tablet        Take 1 tablet (12.5 mg) by mouth 4 times daily as needed for dizziness   Refills:  1        medical cannabis inhalation (Patient's own supply.  Not a prescription)        Inhale into the lungs 3 times daily as needed Reported on  3/28/2017   Refills:  0        medroxyPROGESTERone 150 MG/ML injection   Commonly known as:  DEPO-PROVERA   Dose:  150 mg   Quantity:  3 mL        Inject 1 mL (150 mg) into the muscle every 3 months   Refills:  3        naloxone nasal spray   Commonly known as:  NARCAN   Dose:  4 mg   Quantity:  0.2 mL        Spray 1 spray (4 mg) into one nostril alternating nostrils as needed for opioid reversal every 2-3 minutes until assistance arrives   Refills:  0        NONFORMULARY   Dose:  30 mL   Indication:  Marina Topical Gel Cream, 1 to 4 pumps  to affect area 4 times daily        Apply 30 mLs topically 4 times daily   Refills:  0        oxyCODONE IR 10 MG tablet   Commonly known as:  ROXICODONE   Dose:  10 mg   Quantity:  70 tablet   Start taking on:  2/22/2018        Take 1 tablet (10 mg) by mouth every 4 hours as needed for moderate to severe pain (Max 5/day)   Refills:  0        polyethylene glycol Packet   Commonly known as:  MIRALAX/GLYCOLAX   Dose:  1 packet        Take 1 packet by mouth daily   Refills:  0        promethazine 6.25 MG/5ML syrup   Commonly known as:  PHENERGAN   Dose:  12.5 mg   Quantity:  560 mL        Take 10 mLs (12.5 mg) by mouth 4 times daily as needed for nausea   Refills:  3        rizatriptan 5 MG ODT tab   Commonly known as:  MAXALT-MLT   Dose:  5-10 mg   Quantity:  18 tablet        Take 1-2 tablets (5-10 mg) by mouth at onset of headache for migraine May repeat in 2 hours. Max 6 tablets/24 hours.   Refills:  3                Procedures and tests performed during your visit     CBC with platelets differential    CRP inflammation    Comprehensive metabolic panel    EKG 12-lead, tracing only    Erythrocyte sedimentation rate auto    Lactic acid whole blood    TSH with free T4 reflex    Troponin I      Orders Needing Specimen Collection     Ordered          02/20/18 1325  Occult blood stool - ROUTINE, Prio: Routine, Needs to be Collected     Scheduled Task Status   02/20/18 1326 Collect  Occult blood stool Open   Order Class:  PCU Collect                  Pending Results     No orders found from 2/18/2018 to 2/21/2018.            Pending Culture Results     No orders found from 2/18/2018 to 2/21/2018.            Pending Results Instructions     If you had any lab results that were not finalized at the time of your Discharge, you can call the ED Lab Result RN at [phone]. You will be contacted by this team for any positive Lab results or changes in treatment. The nurses are available 7 days a week from 10A to 6:30P.  You can leave a message 24 hours per day and they will return your call.        Thank you for choosing [facility]       Thank you for choosing [facility] for your care. Our goal is always to provide you with excellent care. Hearing back from our patients is one way we can continue to improve our services. Please take a few minutes to complete the written survey that you may receive in the mail after you visit with us. Thank you!        [employer] Information     [employer] gives you secure access to your electronic health record. If you see a primary care provider, you can also send messages to your care team and make appointments. If you have questions, please call your primary care clinic.  If you do not have a primary care provider, please call [phone] and they will assist you.        Care EveryWhere ID     This is your Care EveryWhere ID. This could be used by other organizations to access your [facility] medical records  [id number]        Equal Access to Services     VIKY VILLANUEVA AH: Ruth Chen, wajonnie kelly, qaybsusan anthony. So [facility] [phone].    ATENCIÓN: Si habla español, tiene a kerr disposición servicios gratuitos de asistencia lingüística. Llame al [phone].    We comply with applicable federal civil rights laws and [place] laws. We do not discriminate on the basis of race, color, national origin,  age, disability, sex, sexual orientation, or gender identity.            After Visit Summary       This is your record. Keep this with you and show to your community pharmacist(s) and doctor(s) at your next visit.

## 2018-02-20 NOTE — TELEPHONE ENCOUNTER
Spoke to pharmacy - can fill oxycodone and valium on Thursday, 2/22/18.     Patient came to clinic - wanted to get oxycodone filled today as leaving for [place] tonight instead of Thursday. Discussed that now she is asking for early fill by 7 days, which I can not do. I am ok with her filling this on Thursday and she might need to wait to go out of town.       Zach Rosario PA-C  [facility]

## 2018-02-20 NOTE — MR AVS SNAPSHOT
After Visit Summary   2/20/2018    Katy Islas    MRN: [id number]           Patient Information     Date Of Birth          1990        Visit Information        Provider Department      2/20/2018 10:30 AM Aura Rosario PA-C; [facility]        Today's Diagnoses     Mild major depression (H)    -  1    Anxiety        Bipolar disorder, current episode mixed, severe, without psychotic features (H)        Chronic pain syndrome        Chronic bilateral low back pain with left-sided sciatica        Postoperative pain        S/P lumbar fusion          Care Instructions    - Decrease pain medication to 5/day   - Recheck 2-3 weeks           Follow-ups after your visit        Who to contact     If you have questions or need follow up information about today's clinic visit or your schedule please contact [facility] directly at [phone].  Normal or non-critical lab and imaging results will be communicated to you by [employer], letter or phone within 4 business days after the clinic has received the results. If you do not hear from us within 7 days, please contact the clinic through [employer] or phone. If you have a critical or abnormal lab result, we will notify you by phone as soon as possible.  Submit refill requests through [employer] or call your pharmacy and they will forward the refill request to us. Please allow 3 business days for your refill to be completed.          Additional Information About Your Visit        MyChart Information     [employer] gives you secure access to your electronic health record. If you see a primary care provider, you can also send messages to your care team and make appointments. If you have questions, please call your primary care clinic.  If you do not have a primary care provider, please call [phone] and they will assist you.        Care EveryWhere ID     This is your Care EveryWhere ID. This could be used by  other organizations to access your [employer] medical records  [id number]        Your Vitals Were     Pulse Temperature Respirations Pulse Oximetry BMI (Body Mass Index)       124 98.5  F (36.9  C) (Oral) 14 100% 26.56 kg/m2        Blood Pressure from Last 3 Encounters:   02/20/18 110/82   02/08/18 118/82   01/29/18 108/78    Weight from Last 3 Encounters:   02/20/18 142 lb 8 oz (64.6 kg)   02/08/18 148 lb 12.8 oz (67.5 kg)   01/29/18 148 lb 3.2 oz (67.2 kg)              Today, you had the following     No orders found for display         Today's Medication Changes          These changes are accurate as of 2/20/18 11:14 AM.  If you have any questions, ask your nurse or doctor.               Start taking these medicines.        Dose/Directions    cloNIDine 0.1 MG tablet   Commonly known as:  CATAPRES   Used for:  Chronic pain syndrome, Chronic bilateral low back pain with left-sided sciatica, Postoperative pain, Anxiety, Mild major depression (H)   Started by:  Aura Rosario PA-C        Dose:  0.1 mg   Take 1 tablet (0.1 mg) by mouth 2 times daily   Quantity:  30 tablet   Refills:  1         These medicines have changed or have updated prescriptions.        Dose/Directions    oxyCODONE IR 10 MG tablet   Commonly known as:  ROXICODONE   This may have changed:  additional instructions   Used for:  Chronic bilateral low back pain with left-sided sciatica   Changed by:  Aura Rosario PA-C        Dose:  10 mg   Start taking on:  2/22/2018   Take 1 tablet (10 mg) by mouth every 4 hours as needed for moderate to severe pain (Max 5/day)   Quantity:  70 tablet   Refills:  0            Where to get your medicines      These medications were sent to [facility] [address] [place], [place] - [address] [address]  AT [facility] [address]  [address] [address], [facility] [address]     Phone:  [phone]     cloNIDine 0.1 MG tablet         Some of these will need a paper prescription and  others can be bought over the counter.  Ask your nurse if you have questions.     Bring a paper prescription for each of these medications     oxyCODONE IR 10 MG tablet                Primary Care Provider Office Phone # Fax #    Aura DIAZ LOVE Rosario [phone]       [address] [place] [address]  [facility] [address]        Equal Access to Services     [facility] Hospital[facility] : Hadii aad ku hadasho Soomaali, waaxda luqadaha, qaybta kaalmada adeegyada, waxay idiin hayaan adeeg kharash lakaylenen . So [facility] [phone].    ATENCIÓN: Si habla español, tiene a kerr disposición servicios gratuitos de asistencia lingüística. [place] al [phone].    We comply with applicable federal civil rights laws and [place] laws. We do not discriminate on the basis of race, color, national origin, age, disability, sex, sexual orientation, or gender identity.            Thank you!     Thank you for choosing [facility]  for your care. Our goal is always to provide you with excellent care. Hearing back from our patients is one way we can continue to improve our services. Please take a few minutes to complete the written survey that you may receive in the mail after your visit with us. Thank you!             Your Updated Medication List - Protect others around you: Learn how to safely use, store and throw away your medicines at www.disposemymeds.org.          This list is accurate as of 2/20/18 11:14 AM.  Always use your most recent med list.                   Brand Name Dispense Instructions for use Diagnosis    ARIPiprazole 15 MG tablet    ABILIFY    30 tablet    Take 1 tablet (15 mg) by mouth At Bedtime    Adjustment disorder with mixed anxiety and depressed mood, Anxiety       cetirizine 10 MG tablet    zyrTEC     Take 10 mg by mouth daily        cloNIDine 0.1 MG tablet    CATAPRES    30 tablet    Take 1 tablet (0.1 mg) by mouth 2 times daily    Chronic pain syndrome, Chronic bilateral low back pain with  left-sided sciatica, Postoperative pain, Anxiety, Mild major depression (H)       cyclobenzaprine 10 MG tablet    FLEXERIL    60 tablet    Take 2 tablets (20 mg) by mouth nightly as needed for muscle spasms or other (back pain)    Chronic bilateral low back pain with left-sided sciatica       dexlansoprazole 60 MG Cpdr CR capsule    DEXILANT    30 capsule    Take 1 capsule (60 mg) by mouth daily    Gastroesophageal reflux disease without esophagitis       diazepam 5 MG tablet    VALIUM    60 tablet    Take 1-2 tablets (5-10 mg) by mouth every 12 hours as needed for anxiety or sleep (MUSCLE SPASM) (one month supply)    Chronic pain syndrome, Chronic bilateral low back pain with left-sided sciatica, Lumbar disc disease with radiculopathy, S/P lumbar fusion       dicyclomine 20 MG tablet    BENTYL    30 tablet    Take 1 tablet (20 mg) by mouth 4 times daily as needed (ABDOMINAL CRAMPING) AS NEEDED FOR CRAMPING    Hx of Crohn's disease       DULoxetine 60 MG EC capsule    CYMBALTA    90 capsule    Take 1 capsule (60 mg) by mouth daily    Adjustment disorder with mixed anxiety and depressed mood       EPINEPHrine 0.3 MG/0.3ML injection 2-pack    EPIPEN/ADRENACLICK/or ANY BX GENERIC EQUIV    0.6 mL    Inject 0.3 mLs (0.3 mg) into the muscle once as needed for anaphylaxis    Bee sting allergy       gabapentin 300 MG capsule    NEURONTIN    90 capsule    Take 1 capsule (300 mg) by mouth 3 times daily    Chronic bilateral low back pain with left-sided sciatica       meclizine 12.5 MG tablet    ANTIVERT    30 tablet    Take 1 tablet (12.5 mg) by mouth 4 times daily as needed for dizziness    Dizziness       medical cannabis inhalation (Patient's own supply.  Not a prescription)      Inhale into the lungs 3 times daily as needed Reported on 3/28/2017        medroxyPROGESTERone 150 MG/ML injection    DEPO-PROVERA    3 mL    Inject 1 mL (150 mg) into the muscle every 3 months    Encounter for surveillance of injectable  contraceptive, Dysmenorrhea       naloxone nasal spray    NARCAN    0.2 mL    Spray 1 spray (4 mg) into one nostril alternating nostrils as needed for opioid reversal every 2-3 minutes until assistance arrives    Chronic pain syndrome, Chronic bilateral low back pain with left-sided sciatica, S/P lumbar fusion, Lumbar disc disease with radiculopathy       NONFORMULARY      Apply 30 mLs topically 4 times daily        oxyCODONE IR 10 MG tablet   Start taking on:  2/22/2018    ROXICODONE    70 tablet    Take 1 tablet (10 mg) by mouth every 4 hours as needed for moderate to severe pain (Max 5/day)    Chronic bilateral low back pain with left-sided sciatica       polyethylene glycol Packet    MIRALAX/GLYCOLAX     Take 1 packet by mouth daily        promethazine 6.25 MG/5ML syrup    PHENERGAN    560 mL    Take 10 mLs (12.5 mg) by mouth 4 times daily as needed for nausea    Nausea       rizatriptan 5 MG ODT tab    MAXALT-MLT    18 tablet    Take 1-2 tablets (5-10 mg) by mouth at onset of headache for migraine May repeat in 2 hours. Max 6 tablets/24 hours.    Migraine with aura and without status migrainosus, not intractable

## 2018-02-20 NOTE — NURSING NOTE
"Chief Complaint   Patient presents with     Depression     Panel Management     Immunizations, Tobacco quit plan,PHQ-9, JULIETH-7, ACT        Initial /82  Pulse 124  Temp 98.5  F (36.9  C) (Oral)  Resp 14  Wt 142 lb 8 oz (64.6 kg)  SpO2 100%  BMI 26.56 kg/m2 Estimated body mass index is 26.56 kg/(m^2) as calculated from the following:    Height as of 1/15/18: 5' 1.42\" (1.56 m).    Weight as of this encounter: 142 lb 8 oz (64.6 kg).  Medication Reconciliation: complete    "

## 2018-02-20 NOTE — ED PROVIDER NOTES
History     Chief Complaint   Patient presents with     Rectal Bleeding     Nausea     HPI  Katy Islas is a 27 year old female with a history of Crohn's disease, GERD, bipolar disorder, chronic pain, hearing loss, who presents the emergency department complaining of nausea, vomiting, abdominal pain, and bloody diarrhea.  She explains in the last 24 hours she has had 3 episodes of watery, bright red stools.  She has had increased left-sided abdominal pain she describes as sharp and stabbing.  She has had nausea and vomited 2 times this morning. Vomit was not bloody. She states she thinks she is having a Crohn's flare. She tried Tylenol two tablets today for her abdominal pain without relief. She also took her oxycodone, but stated she only took that for her back pain. She has not had a fever. She notes her heart rate has been fast for the last three days as well, but denies chest pain, shortness of breath, or lightheadedness. She reports that promethazine and dilaudid usually work for these symptoms.    Problem List:    Patient Active Problem List    Diagnosis Date Noted     Iliotibial band syndrome affecting lower leg, right 12/21/2017     Priority: Medium     Chondromalacia of right patellofemoral joint 12/21/2017     Priority: Medium     Postoperative pain 11/13/2017     Priority: Medium     Closed fracture of coccyx with nonunion, subsequent encounter 10/12/2017     Priority: Medium     Upper GI bleed - suspected 07/01/2017     Priority: Medium     Tobacco abuse 07/01/2017     Priority: Medium     History of pseudoseizure 07/01/2017     Priority: Medium     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) 03/10/2017     Priority: Medium     Lumbar disc disease with radiculopathy 02/23/2017     Priority: Medium     S/P lumbar fusion 02/23/2017     Priority: Medium     Dr. YOVANI Millan, 2/23/17       Vitamin D deficiency 01/06/2017     Priority: Medium     Atypical mole 01/06/2017     Priority: Medium      Mild persistent asthma without complication 12/02/2016     Priority: Medium     Chronic bilateral low back pain with left-sided sciatica 12/02/2016     Priority: Medium     Bee sting allergy 09/16/2016     Priority: Medium     Gastroesophageal reflux disease without esophagitis 08/26/2016     Priority: Medium     Herpes simplex virus infection 11/12/2015     Priority: Medium     Adjustment disorder with mixed anxiety and depressed mood 10/14/2015     Priority: Medium     Marijuana abuse 02/22/2015     Priority: Medium     Grand mal seizure disorder (H) 10/08/2013     Priority: Medium     Bipolar disorder (H) 01/31/2013     Priority: Medium     Problem list name updated by automated process. Provider to review       Nausea and vomiting 12/13/2012     Priority: Medium     Abdominal pain 12/13/2012     Priority: Medium     Oral thrush 06/25/2012     Priority: Medium     Chronic pain 02/09/2012     Priority: Medium     Patient is followed by Aura Rosario PA-C for ongoing prescription of pain medication.  All refills should only be approved by this provider, or covering partner.    Medication(s): Norco    Maximum quantity per month: 70  Clinic visit frequency required: Q 2 months     Controlled substance agreement:   Discussed and signed 4/25/17    Encounter-Level CSA - 01/12/2015:                 Controlled Substance Agreement - Scan on 1/26/2015  9:14 AM : Controlled Medication Agreement-01/12/15 (below)            Pain Clinic evaluation in the past: Yes       Date/Location:   [facility], fall 2016    DIRE Total Score(s):    4/25/2017   Total Score 17       Last [facility] website verification:  done on 4/25/17 by LOVE Maki   [email or web address]           Anxiety 09/22/2011     Priority: Medium     Mild major depression (H) 08/29/2011     Priority: Medium     Crohn's colitis (H) 08/04/2009     Priority: Medium     Dysmenorrhea 05/11/2007     Priority: Medium     Benign neoplasm of skin  of lower limb, including hip 03/16/2004     Priority: Medium     Migraine      Priority: Medium     Dr. Farrar - Neurology.  Now on Inderal.  Seems to be working.  Follow-up 9/08  Problem list name updated by automated process. Provider to review       Hearing loss      Priority: Medium     Congenital  Problem list name updated by automated process. Provider to review       Allergic rhinitis      Priority: Medium     Problem list name updated by automated process. Provider to review          Past Medical History:    Past Medical History:   Diagnosis Date     Abdominal pain 10/31- 11/4/2005     Allergic rhinitis, cause unspecified      Arthritis      C. 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     FUSION SPINE POSTERIOR MINIMALLY INVASIVE ONE LEVEL N/A 2/23/2017    Procedure: FUSION SPINE POSTERIOR MINIMALLY INVASIVE ONE LEVEL;  L4-5 Oblique Lateral Lumbar Interbody Fusion   Epidural steroid injection.   Transpedicular Bone marrow aspiration;  Surgeon: Jeniffer Eugene MD;  Location: [facility] OR     HC COLONOSCOPY THRU STOMA WITH BIOPSY  10/29/2003    Impression is that of normal appearing colonoscopy, without evidence of rectal bleeding.     HC COLONOSCOPY THRU STOMA, DIAGNOSTIC  10/00     normal     HC COLONOSCOPY THRU STOMA, DIAGNOSTIC  Oct 2009    Dr López- normal     HC EEG AWAKE AND SLEEP      abnormal     HC MRI BRAIN W/O CONTRAST  12/00    normal     HC REMOVAL GALLBLADDER  8/5/2009    [facility], [place]     HC UGI ENDOSCOPY DIAG W BIOPSY  11/11/09    Normal esophagus     HC UGI ENDOSCOPY, SIMPLE EXAM  7/00, 10/00    mild chronic esophagitis and duodenitis, neg H pylori     HC UGI ENDOSCOPY, SIMPLE EXAM  01/20/2005    Esophagogastroduodenoscopy, colon[facility] with biopsies[facility]     HC UGI ENDOSCOPY, SIMPLE EXAM  7/1/2009    [facility], [place].     HC UGI ENDOSCOPY, SIMPLE EXAM  11/11/2009    attempted upper GI, pt. could not tolerate procedure:[facility]     ORTHOPEDIC SURGERY  October 19,2011    diskectomy L4-L5       Family History:    Family History   Problem Relation Age of Onset     GASTROINTESTINAL DISEASE Brother      severe Crohn's     Neurologic Disorder Brother      Seizures post head injury     Depression Brother      Substance Abuse Brother      Genitourinary Problems Father      kidney stones     DIABETES Father      HEART DISEASE Father      Open heart surgery     Breast Cancer Maternal Grandmother      Parkinsonism Maternal Grandmother      CEREBROVASCULAR DISEASE Paternal Grandmother      CANCER Maternal Grandfather      Lung     Cardiovascular Paternal Grandfather      Heart Attack     Substance Abuse Mother      Lung Cancer Paternal Uncle      Lung Cancer Maternal Uncle        Social History:  Marital Status:  Single [1]  Social History   Substance Use Topics     Smoking status: Current Every Day Smoker     Packs/day: 0.25     Types: Cigarettes     Smokeless tobacco: Never Used     Alcohol use No        Medications:      [START ON 2/22/2018] oxyCODONE IR (ROXICODONE) 10 MG tablet   cloNIDine (CATAPRES) 0.1 MG tablet   gabapentin (NEURONTIN) 300 MG capsule   cyclobenzaprine (FLEXERIL) 10 MG tablet   ARIPiprazole  "(ABILIFY) 15 MG tablet   diazepam (VALIUM) 5 MG tablet   rizatriptan (MAXALT-MLT) 5 MG ODT tab   promethazine (PHENERGAN) 6.25 MG/5ML syrup   DULoxetine (CYMBALTA) 60 MG EC capsule   meclizine (ANTIVERT) 12.5 MG tablet   polyethylene glycol (MIRALAX/GLYCOLAX) Packet   naloxone (NARCAN) nasal spray   NONFORMULARY   dexlansoprazole (DEXILANT) 60 MG CPDR CR capsule   dicyclomine (BENTYL) 20 MG tablet   medroxyPROGESTERone (DEPO-PROVERA) 150 MG/ML injection   cetirizine (ZYRTEC) 10 MG tablet   medical cannabis inhalation (Patient's own supply.  Not a prescription)   EPINEPHrine (EPIPEN) 0.3 MG/0.3ML injection         Review of Systems   Constitutional: Negative for fever.   Respiratory: Negative for shortness of breath.    Cardiovascular: Positive for palpitations (\"fast heart rate\"). Negative for chest pain.   Gastrointestinal: Positive for abdominal pain, blood in stool, diarrhea, nausea and vomiting.   Genitourinary: Negative for dysuria.   Musculoskeletal: Positive for back pain (chronic).   Neurological: Negative for light-headedness.   All other systems reviewed and are negative.      Physical Exam   BP: (!) 141/91  Pulse: 118  Heart Rate: 142  Temp: 97.2  F (36.2  C)  Resp: 16  SpO2: 98 %      Physical Exam   Constitutional: She is oriented to person, place, and time. She appears well-developed and well-nourished.  Non-toxic appearance. No distress.   HENT:   Head: Normocephalic and atraumatic.   Hearing loss, patient wearing hearing aids today and declines    Eyes: Conjunctivae are normal.   Neck: Normal range of motion.   Cardiovascular: Regular rhythm and normal heart sounds.  Tachycardia present.    Pulmonary/Chest: Effort normal and breath sounds normal. No respiratory distress.   Abdominal: Soft. She exhibits no distension. There is tenderness (generalized throughout, more prominent in upper quadrants).   Musculoskeletal: Normal range of motion.   Neurological: She is alert and oriented to " person, place, and time.   Skin: Skin is warm and dry. She is not diaphoretic.   Psychiatric: She has a normal mood and affect.   Nursing note and vitals reviewed.      ED Course     ED Course     Procedures         EKG Interpretation:      Interpreted by Marian Barton  Time reviewed:1325   Symptoms at time of EKG: tachycardia   Rhythm: Normal sinus  and Sinus tachycardia  Rate: 110-120  Axis: Normal  Ectopy: None  Conduction: Normal  ST Segments/ T Waves: No ST-T wave changes and No acute ischemic changes  Q Waves: None  Comparison to prior: Unchanged    Clinical Impression: no acute changes and sinus tachycardia        Results for orders placed or performed during the hospital encounter of 02/20/18 (from the past 24 hour(s))   CBC with platelets differential   Result Value Ref Range    WBC 6.2 4.0 - 11.0 10e9/L    RBC Count 4.56 3.8 - 5.2 10e12/L    Hemoglobin 15.0 11.7 - 15.7 g/dL    Hematocrit 43.5 35.0 - 47.0 %    MCV 95 78 - 100 fl    MCH 32.9 26.5 - 33.0 pg    MCHC 34.5 31.5 - 36.5 g/dL    RDW 12.8 10.0 - 15.0 %    Platelet Count 281 150 - 450 10e9/L    Diff Method Automated Method     % Neutrophils 87.9 %    % Lymphocytes 8.4 %    % Monocytes 3.5 %    % Eosinophils 0.0 %    % Basophils 0.2 %    Absolute Neutrophil 5.5 1.6 - 8.3 10e9/L    Absolute Lymphocytes 0.5 (L) 0.8 - 5.3 10e9/L    Absolute Monocytes 0.2 0.0 - 1.3 10e9/L    Absolute Eosinophils 0.0 0.0 - 0.7 10e9/L    Absolute Basophils 0.0 0.0 - 0.2 10e9/L   Comprehensive metabolic panel   Result Value Ref Range    Sodium 139 133 - 144 mmol/L    Potassium 4.2 3.4 - 5.3 mmol/L    Chloride 108 94 - 109 mmol/L    Carbon Dioxide 23 20 - 32 mmol/L    Anion Gap 8 3 - 14 mmol/L    Glucose 103 (H) 70 - 99 mg/dL    Urea Nitrogen 13 7 - 30 mg/dL    Creatinine 0.76 0.52 - 1.04 mg/dL    GFR Estimate 90 >60 mL/min/1.7m2    GFR Estimate If Black >90 >60 mL/min/1.7m2    Calcium 9.2 8.5 - 10.1 mg/dL    Bilirubin Total 0.6 0.2 - 1.3 mg/dL    Albumin 4.2 3.4 - 5.0  g/dL    Protein Total 8.2 6.8 - 8.8 g/dL    Alkaline Phosphatase 100 40 - 150 U/L    ALT 22 0 - 50 U/L    AST 13 0 - 45 U/L   Troponin I   Result Value Ref Range    Troponin I ES <0.015 0.000 - 0.045 ug/L   Lactic acid whole blood   Result Value Ref Range    Lactic Acid 1.1 0.7 - 2.0 mmol/L   CRP inflammation   Result Value Ref Range    CRP Inflammation <2.9 0.0 - 8.0 mg/L   Erythrocyte sedimentation rate auto   Result Value Ref Range    Sed Rate 10 0 - 20 mm/h   TSH with free T4 reflex   Result Value Ref Range    TSH 0.92 0.40 - 4.00 mU/L     *Note: Due to a large number of results and/or encounters for the requested time period, some results have not been displayed. A complete set of results can be found in Results Review.       Medications   0.9% sodium chloride BOLUS (0 mLs Intravenous Stopped 2/20/18 6267)   promethazine (PHENERGAN) IV injection 50 mg (50 mg Intravenous Given 2/20/18 0536)   0.9% sodium chloride BOLUS (1,000 mLs Intravenous Rate/Dose Verify 2/20/18 1763)        Assessments & Plan (with Medical Decision Making)  Katy Islas is a 27 year old female who presented to the ED complaining of vomiting, diarrhea, abdominal pain that began last 24 hours.  She reports 3 episodes of grossly bright red bloody stools and is worried of a Crohn's flare.  Denies fever, lightheadedness.  On arrival to the ED she was tachycardic in the 140s.  She had moderate tenderness throughout abdomen.  Due to tachycardia, EKG obtained, and this showed sinus tachycardia. Patient administered IV fluids and phenergan here. She requested dilaudid, but I told her unfortunately we will be unable to give her any narcotics or other sedating medications in the ED unless she has a physical ride present here.  At her last ED visit she eloped and drove home under the influence of narcotics and police had to be called.  She was very understanding of this, but however later did ask for Benadryl which was also declined. She reported  improvement of nausea with phenergan. Her lab studies today showed a normal white count of 6.2, normal lactate of 1.1, negative CRP, normal sed rate. I think likelihood of significant intraabdominal etiology for symptoms less likely given normal lab studies. In regard to tachycardia, her troponin was undetectable, TSH normal. She had no chest pain or shortness of breath.   Because she reported bloody stools, I was going to obtain a guaiac test for further evaluation.  When I went to do this, the patient stated that she did not actually have bloody stools, they were only slightly pink tinged.  I questioned her about her telling me previously they were grossly bright red, and she was very vague and again denied this stating that she did not like rectal exams and refused the guaiac test.  She stated that she will provide a stool sample in her own time.  Her hemoglobin was 15.0, so I think the likelihood of the severe GI bleed is not as likely. She did not have a bowel movement throughout ED stay so guaiac testing not collected. She reported feeling improved after 2L IV fluids and phenergan. HR improved with IV fluids.  At this time, definitive cause of symptoms unclear. Because of reassuring labs, decision made to hold on imaging and patient agreeable. She may have underlying gastroenteritis. She was comfortable managing symptoms at home. She was advised to use home pain and nausea medications as needed for symptom management. Should follow up in clinic in a couple days if no improvement. She was provided instructions on when to return to the emergency department. Patient expressed understanding and was discharged home.     I have reviewed the nursing notes.    I have reviewed the findings, diagnosis, plan and need for follow up with the patient.    New Prescriptions    No medications on file       Final diagnoses:   Vomiting and diarrhea   Abdominal pain, generalized     Note: Chart documentation done in part with  Sanaexpert Voice Recognition software. Although reviewed after completion, some word and grammatical errors may remain.      2/20/2018   [facility] EMERGENCY DEPARTMENT     Marian Barton PA-C  02/20/18 6979

## 2018-02-21 ASSESSMENT — PATIENT HEALTH QUESTIONNAIRE - PHQ9: SUM OF ALL RESPONSES TO PHQ QUESTIONS 1-9: 2

## 2018-02-21 ASSESSMENT — ANXIETY QUESTIONNAIRES: GAD7 TOTAL SCORE: 3

## 2018-02-26 ENCOUNTER — TRANSFERRED RECORDS (OUTPATIENT)
Dept: HEALTH INFORMATION MANAGEMENT | Facility: CLINIC | Age: 28
End: 2018-02-26

## 2018-02-28 ENCOUNTER — MYC MEDICAL ADVICE (OUTPATIENT)
Dept: FAMILY MEDICINE | Facility: OTHER | Age: 28
End: 2018-02-28

## 2018-02-28 ENCOUNTER — TELEPHONE (OUTPATIENT)
Dept: FAMILY MEDICINE | Facility: OTHER | Age: 28
End: 2018-02-28

## 2018-02-28 NOTE — PROGRESS NOTES
SUBJECTIVE:   Katy Islas is a 27 year old female who presents to clinic today for the following health issues:    HPI  ED/UC Followup:    Facility:  [facility]  Date of visit: 2/24/18  Reason for visit: throwing up blood  Current Status: haven't been throwing up blood, sore when swallowing         Patient started feeling nauseous 2/24 and vomited, with first episode of vomiting containing bright red blood  No pain prior to the vomiting, just nausea  After vomiting started, she developed pain with swallowing in mid chest/epigastrium, still there today  Tried Tums without relief  Was seen at ER (unsure which one) and had labs drawn which were reportedly normal, she received nausea meds and was discharged  Still feeling nauseous with epigastric/chest discomfort, last vomited blood 3 days ago  Had 1 episode of bright red blood in stool today, but stool was otherwise normal  No other abdominal pain, no diarrhea or constipation    Had similar symptoms last year and was told there was a tear in her stomach due to the vomiting and that this was the source of the bleeding  Hemoglobin was low at that time  She usually takes Dexilant but has been out of this for a few weeks  Also previously used sucralfate  Symptoms don't feel quite like flares of her Crohns  - Dr. Kirk Summers, [facility]       Chronic Pain Follow-Up       Type / Location of Pain: Back  Analgesia/pain control:       Recent changes:  improved      Overall control: Comfortably manageable  Activity level/function:      Daily activities:  Able to do all daily activities    Work:  Pain does not limit any  work  Adverse effects:  No  Adherance    Taking medication as directed?  Yes    Participating in other treatments: yes  Risk Factors:    Sleep:  Poor    Mood/anxiety:  controlled    Recent family or social stressors:   Pt needs to move, not getting along with roomate    Other aggravating factors: prolonged standing and repetitive activities - like  shoveling     Wants to taper percocet to 5 mg, difficult to cut due to size  Wants to be off these  - Currently on Oxycodone 10 mg - 3/day (30 mg) #60 left, couple of days   - Pt states she would like to discuss tapering medication, percocet to a lower dosage.  - Wants to go to percocet   - Sd in [place] has medication recycling     - Psychiatrist March 21st     PHQ-9 SCORE 2/8/2018 2/20/2018 3/2/2018   Total Score - - -   Total Score MyChart 16 (Moderately severe depression) 2 (Minimal depression) 4 (Minimal depression)   Total Score 16 2 4     JULIETH-7 SCORE 1/15/2018 1/29/2018 2/20/2018   Total Score - - -   Total Score 21 (severe anxiety) 21 (severe anxiety) 3 (minimal anxiety)   Total Score 21 21 3     Encounter-Level CSA - 04/25/2017:          Controlled Substance Agreement - Scan on 5/1/2017 11:06 AM : CONTROLLED SUBSTANCE AGREEMENT (below)            Encounter-Level CSA - 04/25/2017:          Controlled Substance Agreement - Scan on 1/26/2015  9:14 AM : Controlled Medication Agreement-01/12/15 (below)                  RN note from yesterday   Mom called back and stated pt was seen in [place] ED and they told her to follow-up with PCP so that is why she has the appt today. She has not vomited since then. No pain now.   Claudine Jimenez RN, BSN      Problem list and histories reviewed & adjusted, as indicated.  Additional history: as documented      ROS:  Constitutional, HEENT, cardiovascular, pulmonary, gi and gu systems are negative, except as otherwise noted.    OBJECTIVE:   /78 (BP Location: Right arm, Patient Position: Chair, Cuff Size: Adult Regular)  Pulse 146  Temp 98.7  F (37.1  C) (Oral)  Resp 16  Wt 150 lb (68 kg)  SpO2 100%  BMI 27.96 kg/m2  Body mass index is 27.96 kg/(m^2).  GENERAL APPEARANCE: healthy, alert and no distress  EYES: Eyes grossly normal to inspection, PERRLA, conjunctivae and sclerae without injection or discharge, EOM intact   RESP: Lungs clear to  auscultation - no rales, rhonchi or wheezes   CV: Tachycardic but regular rhythm, normal S1 S2, no S3 or S4, no murmur, click or rub, no peripheral edema and peripheral pulses strong and symmetric bilaterally   ABDOMEN: Soft, mild epigastric tenderness, no hepatosplenomegaly, no masses and bowel sounds normal   MS: No musculoskeletal defects are noted and gait is age appropriate without ataxia   SKIN: No suspicious lesions or rashes, hydration status appears adeuqate with normal skin turgor   PSYCH: Alert and oriented x3; speech- coherent , normal rate and volume; able to articulate logical thoughts, able to abstract reason, no tangential thoughts, no hallucinations or delusions, mentation appears normal, Mood is euthymic. Affect is appropriate for this mood state and bright. Thought content is free of suicidal ideation, hallucinations, and delusions. Dress is adequate and upkept. Eye contact is good during conversation.       Diagnostic Test Results:  none     ASSESSMENT/PLAN:       ICD-10-CM    1. Crohn's disease of colon with other complication (H) K50.118 GASTROENTEROLOGY ADULT REF CONSULT ONLY   2. Gastroesophageal reflux disease without esophagitis K21.9 GASTROENTEROLOGY ADULT REF CONSULT ONLY     dexlansoprazole (DEXILANT) 60 MG CPDR CR capsule     DISCONTINUED: dexlansoprazole (DEXILANT) 60 MG CPDR CR capsule   3. S/P lumbar fusion Z98.1 oxyCODONE-acetaminophen (PERCOCET) 5-325 MG per tablet   4. Lumbar disc disease with radiculopathy M51.16 oxyCODONE-acetaminophen (PERCOCET) 5-325 MG per tablet     GERD  Hematemesis  Hematochezia  History of Crohn's Disease  - Patient with ~1 week of hematemesis and 1 episode today of hematochezia, accompanied by epigastric discomfort and reflux; similar symptoms in the past with what sounds like a Saritha Jewel tear  - Referral to [facility] for evaluation and management  - Resume Dexilant, refilled today  - Avoid NSAIDs    Chronic Pain  Lumbar Disc Disease with Radiculopathy,  s/p Lumbar Fusion  - Patient interested in continuing to taper down on oxycodone, having difficulty cutting current pills and wanting to switch to Percocet as this is easier  - Stop Roxicodone; patient will bring these to her pharmacy which has a pill disposal program  - Start Percocet 5-325 mg, max of 5 tablets per day (total of 25 mg of oxycodone daily, down from 30 mg daily on Roxicodone) for 1 week, then 4/day for 1 week   - Recheck in 2 weeks    The patient indicates understanding of these issues and agrees with the plan.    Follow up: 2 weeks     Patient was seen and examined additionally by PA student from [facility], [employer].     Due to language barrier, an  was present during the history-taking and subsequent discussion (and for part of the physical exam) with this patient.        Aura Rosario PA-C  [facility]

## 2018-03-02 ENCOUNTER — TELEPHONE (OUTPATIENT)
Dept: FAMILY MEDICINE | Facility: OTHER | Age: 28
End: 2018-03-02

## 2018-03-02 ENCOUNTER — OFFICE VISIT (OUTPATIENT)
Dept: FAMILY MEDICINE | Facility: OTHER | Age: 28
End: 2018-03-02
Payer: MEDICARE

## 2018-03-02 VITALS
SYSTOLIC BLOOD PRESSURE: 118 MMHG | WEIGHT: 150 LBS | DIASTOLIC BLOOD PRESSURE: 78 MMHG | RESPIRATION RATE: 16 BRPM | HEART RATE: 146 BPM | OXYGEN SATURATION: 100 % | BODY MASS INDEX: 27.96 KG/M2 | TEMPERATURE: 98.7 F

## 2018-03-02 DIAGNOSIS — M51.16 LUMBAR DISC DISEASE WITH RADICULOPATHY: ICD-10-CM

## 2018-03-02 DIAGNOSIS — K21.9 GASTROESOPHAGEAL REFLUX DISEASE WITHOUT ESOPHAGITIS: ICD-10-CM

## 2018-03-02 DIAGNOSIS — Z98.1 S/P LUMBAR FUSION: ICD-10-CM

## 2018-03-02 DIAGNOSIS — K50.118 CROHN'S DISEASE OF COLON WITH OTHER COMPLICATION (H): Primary | ICD-10-CM

## 2018-03-02 PROCEDURE — 99214 OFFICE O/P EST MOD 30 MIN: CPT | Performed by: PHYSICIAN ASSISTANT

## 2018-03-02 RX ORDER — OXYCODONE AND ACETAMINOPHEN 5; 325 MG/1; MG/1
1 TABLET ORAL EVERY 4 HOURS PRN
Qty: 63 TABLET | Refills: 0 | Status: SHIPPED | OUTPATIENT
Start: 2018-03-02 | End: 2018-03-12

## 2018-03-02 RX ORDER — DEXLANSOPRAZOLE 60 MG/1
60 CAPSULE, DELAYED RELEASE ORAL DAILY
Qty: 90 CAPSULE | Refills: 3 | Status: SHIPPED | OUTPATIENT
Start: 2018-03-02 | End: 2018-04-03

## 2018-03-02 RX ORDER — DEXLANSOPRAZOLE 60 MG/1
60 CAPSULE, DELAYED RELEASE ORAL DAILY
Qty: 90 CAPSULE | Refills: 3 | Status: SHIPPED | OUTPATIENT
Start: 2018-03-02 | End: 2018-03-02

## 2018-03-02 ASSESSMENT — PATIENT HEALTH QUESTIONNAIRE - PHQ9
SUM OF ALL RESPONSES TO PHQ QUESTIONS 1-9: 4
SUM OF ALL RESPONSES TO PHQ QUESTIONS 1-9: 4
10. IF YOU CHECKED OFF ANY PROBLEMS, HOW DIFFICULT HAVE THESE PROBLEMS MADE IT FOR YOU TO DO YOUR WORK, TAKE CARE OF THINGS AT HOME, OR GET ALONG WITH OTHER PEOPLE: NOT DIFFICULT AT ALL

## 2018-03-02 NOTE — LETTER
3/2/2018        RE: Katy Islas  [address]  [place] [address]        I have mailed 3-month supply of 675 batteries to the address listed in your chart. Please let me know if you need anything else.      Sincerely,    Amira Brown.  Licensed Audiologist, [place] #[id number]  [facility]  3/7/2018

## 2018-03-02 NOTE — LETTER
3/2/2018        RE: Katy Islas  [address]  [place] [address]    I have mailed a 3-month supply of 675 batteries to the address listed in your chart.      Sincerely,        Amira Brown.  Licensed Audiologist, [place] #[id number]  [facility]  3/7/2018

## 2018-03-02 NOTE — TELEPHONE ENCOUNTER
Mom called back and stated pt was seen in [place] ED and they told her to follow-up with PCP so that is why she has the appt today. She has not vomited since then. No pain now.     Claudine Jimenez, RN, BSN

## 2018-03-02 NOTE — NURSING NOTE
"Chief Complaint   Patient presents with     ER F/U       Initial /78 (BP Location: Right arm, Patient Position: Chair, Cuff Size: Adult Regular)  Pulse 146  Temp 98.7  F (37.1  C) (Oral)  Resp 16  Wt 150 lb (68 kg)  SpO2 100%  BMI 27.96 kg/m2 Estimated body mass index is 27.96 kg/(m^2) as calculated from the following:    Height as of 1/15/18: 5' 1.42\" (1.56 m).    Weight as of this encounter: 150 lb (68 kg).  Medication Reconciliation: complete  "

## 2018-03-02 NOTE — TELEPHONE ENCOUNTER
Patient presents to [facility] desk to sent a message to Aaron the audiologist to see if she can get batteries for her hearing aid?  She is wondering if it can be mailed to her house?  Please advise.  Thank you

## 2018-03-02 NOTE — PATIENT INSTRUCTIONS
- Get rid of all oxycodone pain medications     - Percocet 5-325 mg, 5 per day     Every week decrease by 1 tablet/day      5 per day for 1 week     4 per day for 1 week, etc.     - Recheck 2 weeks

## 2018-03-02 NOTE — MR AVS SNAPSHOT
After Visit Summary   3/2/2018    Katy Islas    MRN: [id number]           Patient Information     Date Of Birth          1990        Visit Information        Provider Department      3/2/2018 11:45 AM Aura Rosario PA-C; [facility]        Today's Diagnoses     Crohn's disease of colon with other complication (H)    -  1    Gastroesophageal reflux disease without esophagitis        S/P lumbar fusion        Lumbar disc disease with radiculopathy          Care Instructions    - Get rid of all oxycodone pain medications     - Percocet 5-325 mg, 5 per day     Every week decrease by 1 tablet/day      5 per day for 1 week     4 per day for 1 week, etc.     - Recheck 2 weeks               Follow-ups after your visit        Additional Services     GASTROENTEROLOGY ADULT REF CONSULT ONLY       Preferred Location: [place] [facility] [phone]      Please be aware that coverage of these services is subject to the terms and limitations of your health insurance plan.  Call member services at your health plan with any benefit or coverage questions.  Any procedures must be performed at a [facility] facility OR coordinated by your clinic's referral office.    Please bring the following with you to your appointment:    (1) Any X-Rays, CTs or MRIs which have been performed.  Contact the facility where they were done to arrange for  prior to your scheduled appointment.    (2) List of current medications   (3) This referral request   (4) Any documents/labs given to you for this referral                  Follow-up notes from your care team     Return in about 2 weeks (around 3/16/2018).      Who to contact     If you have questions or need follow up information about today's clinic visit or your schedule please contact [facility] directly at [phone].  Normal or non-critical lab and imaging results will be communicated to you by MyChart, letter or phone  within 4 business days after the clinic has received the results. If you do not hear from us within 7 days, please contact the clinic through PeptiVir or phone. If you have a critical or abnormal lab result, we will notify you by phone as soon as possible.  Submit refill requests through PeptiVir or call your pharmacy and they will forward the refill request to us. Please allow 3 business days for your refill to be completed.          Additional Information About Your Visit        PeptiVir Information     PeptiVir gives you secure access to your electronic health record. If you see a primary care provider, you can also send messages to your care team and make appointments. If you have questions, please call your primary care clinic.  If you do not have a primary care provider, please call [phone] and they will assist you.        Care EveryWhere ID     This is your Care EveryWhere ID. This could be used by other organizations to access your [place] medical records  [id number]        Your Vitals Were     Pulse Temperature Respirations Pulse Oximetry BMI (Body Mass Index)       146 98.7  F (37.1  C) (Oral) 16 100% 27.96 kg/m2        Blood Pressure from Last 3 Encounters:   03/02/18 118/78   02/20/18 (!) 129/93   02/20/18 110/82    Weight from Last 3 Encounters:   03/02/18 150 lb (68 kg)   02/20/18 142 lb 8 oz (64.6 kg)   02/08/18 148 lb 12.8 oz (67.5 kg)              We Performed the Following     GASTROENTEROLOGY ADULT REF CONSULT ONLY          Today's Medication Changes          These changes are accurate as of 3/2/18 12:32 PM.  If you have any questions, ask your nurse or doctor.               Start taking these medicines.        Dose/Directions    oxyCODONE-acetaminophen 5-325 MG per tablet   Commonly known as:  PERCOCET   Used for:  S/P lumbar fusion, Lumbar disc disease with radiculopathy   Started by:  Aura Rosario PA-C        Dose:  1 tablet   Take 1 tablet by mouth every 4 hours as  needed for pain maximum 5 tablet(s) per day for 1 week, then 4 tablets per day for 1 week   Quantity:  63 tablet   Refills:  0            Where to get your medicines      These medications were sent to [facility] - [place], [place] - [address] [address] [facility] [address]  [address] [address], [facility] [address]     Phone:  [phone]     dexlansoprazole 60 MG Cpdr CR capsule         Some of these will need a paper prescription and others can be bought over the counter.  Ask your nurse if you have questions.     Bring a paper prescription for each of these medications     oxyCODONE-acetaminophen 5-325 MG per tablet                Primary Care Provider Office Phone # Fax #    Aura EMILY Rosario PA-C [phone]       [address] [place] [address] [address]  [facility] [address]        Equal Access to Services     [facility] Hospital[facility] : Hadii aad ku hadasho Soomaali, waaxda luqadaha, qaybta kaalmada adeegyada, waxay calebin hayaan norberto santo . So [facility] [phone].    ATENCIÓN: Si habla español, tiene a kerr disposición servicios gratuitos de asistencia lingüística. Llsofia al [phone].    We comply with applicable federal civil rights laws and [place] laws. We do not discriminate on the basis of race, color, national origin, age, disability, sex, sexual orientation, or gender identity.            Thank you!     Thank you for choosing [facility]  for your care. Our goal is always to provide you with excellent care. Hearing back from our patients is one way we can continue to improve our services. Please take a few minutes to complete the written survey that you may receive in the mail after your visit with us. Thank you!             Your Updated Medication List - Protect others around you: Learn how to safely use, store and throw away your medicines at www.disposemymeds.org.          This list is accurate as of 3/2/18 12:32 PM.  Always use your most recent med list.                    Brand Name Dispense Instructions for use Diagnosis    ARIPiprazole 15 MG tablet    ABILIFY    30 tablet    Take 1 tablet (15 mg) by mouth At Bedtime    Adjustment disorder with mixed anxiety and depressed mood, Anxiety       cetirizine 10 MG tablet    zyrTEC     Take 10 mg by mouth daily        cloNIDine 0.1 MG tablet    CATAPRES    30 tablet    Take 1 tablet (0.1 mg) by mouth 2 times daily    Chronic pain syndrome, Chronic bilateral low back pain with left-sided sciatica, Postoperative pain, Anxiety, Mild major depression (H)       cyclobenzaprine 10 MG tablet    FLEXERIL    60 tablet    Take 2 tablets (20 mg) by mouth nightly as needed for muscle spasms or other (back pain)    Chronic bilateral low back pain with left-sided sciatica       dexlansoprazole 60 MG Cpdr CR capsule    DEXILANT    90 capsule    Take 1 capsule (60 mg) by mouth daily    Gastroesophageal reflux disease without esophagitis       diazepam 5 MG tablet    VALIUM    60 tablet    Take 1-2 tablets (5-10 mg) by mouth every 12 hours as needed for anxiety or sleep (MUSCLE SPASM) (one month supply)    Chronic pain syndrome, Chronic bilateral low back pain with left-sided sciatica, Lumbar disc disease with radiculopathy, S/P lumbar fusion       dicyclomine 20 MG tablet    BENTYL    30 tablet    Take 1 tablet (20 mg) by mouth 4 times daily as needed (ABDOMINAL CRAMPING) AS NEEDED FOR CRAMPING    Hx of Crohn's disease       DULoxetine 60 MG EC capsule    CYMBALTA    90 capsule    Take 1 capsule (60 mg) by mouth daily    Adjustment disorder with mixed anxiety and depressed mood       EPINEPHrine 0.3 MG/0.3ML injection 2-pack    EPIPEN/ADRENACLICK/or ANY BX GENERIC EQUIV    0.6 mL    Inject 0.3 mLs (0.3 mg) into the muscle once as needed for anaphylaxis    Bee sting allergy       gabapentin 300 MG capsule    NEURONTIN    90 capsule    Take 1 capsule (300 mg) by mouth 3 times daily    Chronic bilateral low back pain with left-sided sciatica        meclizine 12.5 MG tablet    ANTIVERT    30 tablet    Take 1 tablet (12.5 mg) by mouth 4 times daily as needed for dizziness    Dizziness       medical cannabis inhalation (Patient's own supply.  Not a prescription)      Inhale into the lungs 3 times daily as needed Reported on 3/28/2017        medroxyPROGESTERone 150 MG/ML injection    DEPO-PROVERA    3 mL    Inject 1 mL (150 mg) into the muscle every 3 months    Encounter for surveillance of injectable contraceptive, Dysmenorrhea       naloxone nasal spray    NARCAN    0.2 mL    Spray 1 spray (4 mg) into one nostril alternating nostrils as needed for opioid reversal every 2-3 minutes until assistance arrives    Chronic pain syndrome, Chronic bilateral low back pain with left-sided sciatica, S/P lumbar fusion, Lumbar disc disease with radiculopathy       NONFORMULARY      Apply 30 mLs topically 4 times daily        oxyCODONE IR 10 MG tablet    ROXICODONE    70 tablet    Take 1 tablet (10 mg) by mouth every 4 hours as needed for moderate to severe pain (Max 5/day)    Chronic bilateral low back pain with left-sided sciatica       oxyCODONE-acetaminophen 5-325 MG per tablet    PERCOCET    63 tablet    Take 1 tablet by mouth every 4 hours as needed for pain maximum 5 tablet(s) per day for 1 week, then 4 tablets per day for 1 week    S/P lumbar fusion, Lumbar disc disease with radiculopathy       polyethylene glycol Packet    MIRALAX/GLYCOLAX     Take 1 packet by mouth daily        promethazine 6.25 MG/5ML syrup    PHENERGAN    560 mL    Take 10 mLs (12.5 mg) by mouth 4 times daily as needed for nausea    Nausea       rizatriptan 5 MG ODT tab    MAXALT-MLT    18 tablet    Take 1-2 tablets (5-10 mg) by mouth at onset of headache for migraine May repeat in 2 hours. Max 6 tablets/24 hours.    Migraine with aura and without status migrainosus, not intractable

## 2018-03-03 ASSESSMENT — ASTHMA QUESTIONNAIRES: ACT_TOTALSCORE: 25

## 2018-03-03 ASSESSMENT — PATIENT HEALTH QUESTIONNAIRE - PHQ9: SUM OF ALL RESPONSES TO PHQ QUESTIONS 1-9: 4

## 2018-03-05 ENCOUNTER — TELEPHONE (OUTPATIENT)
Dept: FAMILY MEDICINE | Facility: OTHER | Age: 28
End: 2018-03-05

## 2018-03-05 NOTE — TELEPHONE ENCOUNTER
You placed a referral for patient to audiology on 2/15/18..  Patient has not scheduled as of yet.      Please review and forward to team if follow up with the patient is needed.     Thank you!  Natacha/Clinic Referrals Dyad II

## 2018-03-07 DIAGNOSIS — H90.3 SENSORINEURAL HEARING LOSS, BILATERAL: Primary | ICD-10-CM

## 2018-03-07 PROCEDURE — 99207 ZZC NO CHARGE LOS: CPT | Performed by: AUDIOLOGIST

## 2018-03-07 PROCEDURE — V5266 BATTERY FOR HEARING DEVICE: HCPCS | Performed by: AUDIOLOGIST

## 2018-03-07 NOTE — PROGRESS NOTES
AUDIOLOGY REPORT: HEARING AID BATTERIES    Katy Islas contacted audiology to request 3-month supply of hearing aid batteries be mailed to their home address. Mailed 24 size 675 batteries to the address on file.     CHARGES:   .002 (X24) Batteries ($1.00 each)   Total: $24    Please bill to patient's insurance.    Amira Brown  Licensed Audiologist, [place] #[id number]  [facility]  3/7/2018

## 2018-03-07 NOTE — TELEPHONE ENCOUNTER
Mailed 3-month supply of batteries to patient.    Amira Brown.  Licensed Audiologist, [place] #[id number]  [facility]  3/7/2018

## 2018-03-12 ENCOUNTER — OFFICE VISIT (OUTPATIENT)
Dept: FAMILY MEDICINE | Facility: OTHER | Age: 28
End: 2018-03-12
Payer: MEDICARE

## 2018-03-12 VITALS
RESPIRATION RATE: 20 BRPM | HEART RATE: 128 BPM | WEIGHT: 149 LBS | DIASTOLIC BLOOD PRESSURE: 88 MMHG | BODY MASS INDEX: 27.77 KG/M2 | OXYGEN SATURATION: 100 % | SYSTOLIC BLOOD PRESSURE: 128 MMHG | TEMPERATURE: 99.6 F

## 2018-03-12 DIAGNOSIS — M54.42 CHRONIC BILATERAL LOW BACK PAIN WITH LEFT-SIDED SCIATICA: ICD-10-CM

## 2018-03-12 DIAGNOSIS — G89.18 POSTOPERATIVE PAIN: ICD-10-CM

## 2018-03-12 DIAGNOSIS — G89.4 CHRONIC PAIN SYNDROME: Primary | ICD-10-CM

## 2018-03-12 DIAGNOSIS — S32.2XXK CLOSED FRACTURE OF COCCYX WITH NONUNION, SUBSEQUENT ENCOUNTER: ICD-10-CM

## 2018-03-12 DIAGNOSIS — M51.16 LUMBAR DISC DISEASE WITH RADICULOPATHY: ICD-10-CM

## 2018-03-12 DIAGNOSIS — F32.0 MILD MAJOR DEPRESSION (H): ICD-10-CM

## 2018-03-12 DIAGNOSIS — G89.29 CHRONIC BILATERAL LOW BACK PAIN WITH LEFT-SIDED SCIATICA: ICD-10-CM

## 2018-03-12 DIAGNOSIS — F41.9 ANXIETY: ICD-10-CM

## 2018-03-12 DIAGNOSIS — Z98.1 S/P LUMBAR FUSION: ICD-10-CM

## 2018-03-12 PROCEDURE — 99214 OFFICE O/P EST MOD 30 MIN: CPT | Performed by: PHYSICIAN ASSISTANT

## 2018-03-12 RX ORDER — CLONIDINE HYDROCHLORIDE 0.1 MG/1
0.2 TABLET ORAL 2 TIMES DAILY
Qty: 60 TABLET | Refills: 1 | Status: SHIPPED | OUTPATIENT
Start: 2018-03-12 | End: 2018-06-19

## 2018-03-12 RX ORDER — OXYCODONE AND ACETAMINOPHEN 5; 325 MG/1; MG/1
1 TABLET ORAL EVERY 4 HOURS PRN
Qty: 35 TABLET | Refills: 0 | Status: SHIPPED | OUTPATIENT
Start: 2018-03-12 | End: 2018-03-20

## 2018-03-12 ASSESSMENT — PAIN SCALES - GENERAL: PAINLEVEL: MODERATE PAIN (4)

## 2018-03-12 NOTE — PATIENT INSTRUCTIONS
- Clonidine 2 tablets twice daily (4 total)     - Decrease to 3/tablets per day for 1 week    Then decrease to 2/tablets per day for 1 week     - Recheck 2 weeks

## 2018-03-12 NOTE — MR AVS SNAPSHOT
After Visit Summary   3/12/2018    Katy Islas    MRN: [id number]           Patient Information     Date Of Birth          1990        Visit Information        Provider Department      3/12/2018 11:45 AM Aura Rosario PA-C; [facility]        Today's Diagnoses     Chronic pain syndrome    -  1    S/P lumbar fusion        Lumbar disc disease with radiculopathy        Closed fracture of coccyx with nonunion, subsequent encounter        Postoperative pain        Chronic bilateral low back pain with left-sided sciatica        Anxiety        Mild major depression (H)          Care Instructions    - Clonidine 2 tablets twice daily (4 total)     - Decrease to 3/tablets per day for 1 week    Then decrease to 2/tablets per day for 1 week     - Recheck 2 weeks                       Follow-ups after your visit        Follow-up notes from your care team     Return in about 2 weeks (around 3/26/2018).      Who to contact     If you have questions or need follow up information about today's clinic visit or your schedule please contact [facility] directly at [phone].  Normal or non-critical lab and imaging results will be communicated to you by Mylahart, letter or phone within 4 business days after the clinic has received the results. If you do not hear from us within 7 days, please contact the clinic through Mylahart or phone. If you have a critical or abnormal lab result, we will notify you by phone as soon as possible.  Submit refill requests through worldhistoryproject or call your pharmacy and they will forward the refill request to us. Please allow 3 business days for your refill to be completed.          Additional Information About Your Visit        MyChart Information     worldhistoryproject gives you secure access to your electronic health record. If you see a primary care provider, you can also send messages to your care team and make appointments. If you have  questions, please call your primary care clinic.  If you do not have a primary care provider, please call [phone] and they will assist you.        Care EveryWhere ID     This is your Care EveryWhere ID. This could be used by other organizations to access your [place] medical records  [id number]        Your Vitals Were     Pulse Temperature Respirations Pulse Oximetry BMI (Body Mass Index)       128 99.6  F (37.6  C) (Temporal) 20 100% 27.77 kg/m2        Blood Pressure from Last 3 Encounters:   03/12/18 128/88   03/02/18 118/78   02/20/18 (!) 129/93    Weight from Last 3 Encounters:   03/12/18 149 lb (67.6 kg)   03/02/18 150 lb (68 kg)   02/20/18 142 lb 8 oz (64.6 kg)              Today, you had the following     No orders found for display         Today's Medication Changes          These changes are accurate as of 3/12/18 12:11 PM.  If you have any questions, ask your nurse or doctor.               These medicines have changed or have updated prescriptions.        Dose/Directions    oxyCODONE-acetaminophen 5-325 MG per tablet   Commonly known as:  PERCOCET   This may have changed:  additional instructions   Used for:  S/P lumbar fusion, Lumbar disc disease with radiculopathy   Changed by:  Aura Rosario PA-C        Dose:  1 tablet   Take 1 tablet by mouth every 4 hours as needed for pain maximum 3 tablet(s) per day for 1 week, then 2 tablets per day for 1 week   Quantity:  35 tablet   Refills:  0            Where to get your medicines      Some of these will need a paper prescription and others can be bought over the counter.  Ask your nurse if you have questions.     Bring a paper prescription for each of these medications     oxyCODONE-acetaminophen 5-325 MG per tablet                Primary Care Provider Office Phone # Fax #    Aura Rosario PA-C [phone]       [address]  [facility] [address]        Equal Access to Services     VIKY VILLANUEVA AH:  Hadii aad ku hadpeteo Soomaali, waaxda luqadaha, qaybta kaalmada ellyn, susan calebin hayaapercy brownfloyd trinidad lakaylenepercy yue. So [facility] [place] [phone].    ATENCIÓN: Si maggi nathan, tiene a kerr disposición servicios gratuitos de asistencia lingüística. Llame al [phone].    We comply with applicable federal civil rights laws and [place] laws. We do not discriminate on the basis of race, color, national origin, age, disability, sex, sexual orientation, or gender identity.            Thank you!     Thank you for choosing [facility]  for your care. Our goal is always to provide you with excellent care. Hearing back from our patients is one way we can continue to improve our services. Please take a few minutes to complete the written survey that you may receive in the mail after your visit with us. Thank you!             Your Updated Medication List - Protect others around you: Learn how to safely use, store and throw away your medicines at www.disposemymeds.org.          This list is accurate as of 3/12/18 12:11 PM.  Always use your most recent med list.                   Brand Name Dispense Instructions for use Diagnosis    ARIPiprazole 15 MG tablet    ABILIFY    30 tablet    Take 1 tablet (15 mg) by mouth At Bedtime    Adjustment disorder with mixed anxiety and depressed mood, Anxiety       cetirizine 10 MG tablet    zyrTEC     Take 10 mg by mouth daily        cloNIDine 0.1 MG tablet    CATAPRES    30 tablet    Take 1 tablet (0.1 mg) by mouth 2 times daily    Chronic pain syndrome, Chronic bilateral low back pain with left-sided sciatica, Postoperative pain, Anxiety, Mild major depression (H)       cyclobenzaprine 10 MG tablet    FLEXERIL    60 tablet    Take 2 tablets (20 mg) by mouth nightly as needed for muscle spasms or other (back pain)    Chronic bilateral low back pain with left-sided sciatica       dexlansoprazole 60 MG Cpdr CR capsule    DEXILANT    90 capsule    Take 1 capsule (60 mg) by mouth  daily    Gastroesophageal reflux disease without esophagitis       diazepam 5 MG tablet    VALIUM    60 tablet    Take 1-2 tablets (5-10 mg) by mouth every 12 hours as needed for anxiety or sleep (MUSCLE SPASM) (one month supply)    Chronic pain syndrome, Chronic bilateral low back pain with left-sided sciatica, Lumbar disc disease with radiculopathy, S/P lumbar fusion       dicyclomine 20 MG tablet    BENTYL    30 tablet    Take 1 tablet (20 mg) by mouth 4 times daily as needed (ABDOMINAL CRAMPING) AS NEEDED FOR CRAMPING    Hx of Crohn's disease       DULoxetine 60 MG EC capsule    CYMBALTA    90 capsule    Take 1 capsule (60 mg) by mouth daily    Adjustment disorder with mixed anxiety and depressed mood       EPINEPHrine 0.3 MG/0.3ML injection 2-pack    EPIPEN/ADRENACLICK/or ANY BX GENERIC EQUIV    0.6 mL    Inject 0.3 mLs (0.3 mg) into the muscle once as needed for anaphylaxis    Bee sting allergy       gabapentin 300 MG capsule    NEURONTIN    90 capsule    Take 1 capsule (300 mg) by mouth 3 times daily    Chronic bilateral low back pain with left-sided sciatica       meclizine 12.5 MG tablet    ANTIVERT    30 tablet    Take 1 tablet (12.5 mg) by mouth 4 times daily as needed for dizziness    Dizziness       medical cannabis inhalation (Patient's own supply.  Not a prescription)      Inhale into the lungs 3 times daily as needed Reported on 3/28/2017        medroxyPROGESTERone 150 MG/ML injection    DEPO-PROVERA    3 mL    Inject 1 mL (150 mg) into the muscle every 3 months    Encounter for surveillance of injectable contraceptive, Dysmenorrhea       naloxone nasal spray    NARCAN    0.2 mL    Spray 1 spray (4 mg) into one nostril alternating nostrils as needed for opioid reversal every 2-3 minutes until assistance arrives    Chronic pain syndrome, Chronic bilateral low back pain with left-sided sciatica, S/P lumbar fusion, Lumbar disc disease with radiculopathy       NONFORMULARY      Apply 30 mLs topically 4  times daily        oxyCODONE-acetaminophen 5-325 MG per tablet    PERCOCET    35 tablet    Take 1 tablet by mouth every 4 hours as needed for pain maximum 3 tablet(s) per day for 1 week, then 2 tablets per day for 1 week    S/P lumbar fusion, Lumbar disc disease with radiculopathy       polyethylene glycol Packet    MIRALAX/GLYCOLAX     Take 1 packet by mouth daily        promethazine 6.25 MG/5ML syrup    PHENERGAN    560 mL    Take 10 mLs (12.5 mg) by mouth 4 times daily as needed for nausea    Nausea       rizatriptan 5 MG ODT tab    MAXALT-MLT    18 tablet    Take 1-2 tablets (5-10 mg) by mouth at onset of headache for migraine May repeat in 2 hours. Max 6 tablets/24 hours.    Migraine with aura and without status migrainosus, not intractable

## 2018-03-12 NOTE — NURSING NOTE
"Chief Complaint   Patient presents with     Back Pain       Initial /88 (BP Location: Right arm, Patient Position: Chair, Cuff Size: Adult Regular)  Pulse 128  Temp 99.6  F (37.6  C) (Temporal)  Resp 20  Wt 149 lb (67.6 kg)  SpO2 100%  BMI 27.77 kg/m2 Estimated body mass index is 27.77 kg/(m^2) as calculated from the following:    Height as of 1/15/18: 5' 1.42\" (1.56 m).    Weight as of this encounter: 149 lb (67.6 kg).  Medication Reconciliation: complete   Katy Sheikh CMA      "

## 2018-03-12 NOTE — PROGRESS NOTES
SUBJECTIVE:   Katy Islas is a 27 year old female who presents to clinic today for the following health issues:    OK for student.   HPI  Back Pain Follow Up      Description:   Location of pain:  left  Character of pain: sharp, dull ache and stabbing  Pain radiation: Does not radiate and radiates into the left buttocks  Since last visit, pain is:  improved  New numbness or weakness in legs, not attributed to pain:  YES- numbness and tingling     Intensity: Currently 4/10    History:   Pain interferes with job: Not applicable  Therapies tried without relief: ice  Therapies tried with relief: Medication, hot bath, walking around and stretching        Having a little withdrawal with the percocet  Hard time sleeping, very restless, diarrhea, abdominal cramping (also just started period)  Similar symptoms in the past with cutting down  Clonidine usually helps with those symptoms - taking 1 tab twice daily, wondering about increasing dose while tapering off oxycodone  Feels like her pain is still controlled      Plan from last visit 3/2/18  Chronic Pain  Lumbar Disc Disease with Radiculopathy, s/p Lumbar Fusion  - Patient interested in continuing to taper down on oxycodone, having difficulty cutting current pills and wanting to switch to Percocet as this is easier  - Stop Roxicodone; patient will bring these to her pharmacy which has a pill disposal program  - Start Percocet 5-325 mg, max of 5 tablets per day (total of 25 mg of oxycodone daily, down from 30 mg daily on Roxicodone) for 1 week, then 4/day for 1 week   - Recheck in 2 weeks    Patient also concerned about an occasional fast heart beat over the past few weeks. She states this is exacerbated by stress and relieved by relaxing and rest.      Problem list and histories reviewed & adjusted, as indicated.  Additional history: as documented    ROS:  Constitutional, HEENT, cardiovascular, pulmonary, gi and gu systems are negative, except as otherwise  noted.    OBJECTIVE:   /88 (BP Location: Right arm, Patient Position: Chair, Cuff Size: Adult Regular)  Pulse 128  Temp 99.6  F (37.6  C) (Temporal)  Resp 20  Wt 149 lb (67.6 kg)  SpO2 100%  BMI 27.77 kg/m2  Body mass index is 27.77 kg/(m^2).  GENERAL APPEARANCE: healthy, alert and no distress  EYES: Eyes grossly normal to inspection, PERRLA, conjunctivae and sclerae without injection or discharge, EOM intact   MS: No musculoskeletal defects are noted and gait is age appropriate without ataxia   CV: Tachycardic but regular rhythm, no M/G/R  PULM: CTA throughout  SKIN: No suspicious lesions or rashes, hydration status appears adeuqate with normal skin turgor   NEURO: Strength 5+ bilateral lower extremities, sensation intact in distal bilateral lower extremities, mentation- intact, speech- normal, reflexes- symmetric in bilateral lower extremities  BACK: No CVA tenderness, mild left paralumbar tenderness, no midline tenderness, normal ROM   PSYCH: Alert and oriented x3; speech- coherent , normal rate and volume; able to articulate logical thoughts, able to abstract reason, no tangential thoughts, no hallucinations or delusions, mentation appears normal, Mood is euthymic. Affect is appropriate for this mood state and bright. Thought content is free of suicidal ideation, hallucinations, and delusions. Dress is adequate and upkept. Eye contact is good during conversation.       Diagnostic Test Results:  none     ASSESSMENT/PLAN:       ICD-10-CM    1. Chronic pain syndrome G89.4 cloNIDine (CATAPRES) 0.1 MG tablet   2. S/P lumbar fusion Z98.1 oxyCODONE-acetaminophen (PERCOCET) 5-325 MG per tablet   3. Lumbar disc disease with radiculopathy M51.16 oxyCODONE-acetaminophen (PERCOCET) 5-325 MG per tablet   4. Closed fracture of coccyx with nonunion, subsequent encounter S32.2XXK    5. Postoperative pain G89.18 cloNIDine (CATAPRES) 0.1 MG tablet   6. Chronic bilateral low back pain with left-sided sciatica M54.42  cloNIDine (CATAPRES) 0.1 MG tablet    G89.29    7. Anxiety F41.9 cloNIDine (CATAPRES) 0.1 MG tablet   8. Mild major depression (H) F32.0 cloNIDine (CATAPRES) 0.1 MG tablet     1 - 6  - Currently on Percocet four tabs per day, having her typical withdrawal symptoms on the lower dose of oxycodone (diarrhea, abdominal cramps, restlessness, hard time sleeping) but pain is adequately controlled  - Continue with taper, cut down to 3 tabs per day for 1 week, then 2 tabs per day for 1 week  - Will increase clonidine during taper from one 0.1 tablet BID to two tablets BID  - Recheck in 2 weeks  -  reviewed, no concerns as expected    7 - 8  - Feeling more restless than usual but this is likely due to her withdrawal while tapering off oxycodone, otherwise stable; currently on Abilify, Cymbalta, and Valium  - If mood does not improve once tapered off of oxycodone, would adjust her medications  - Recheck in 2 weeks    The patient indicates understanding of these issues and agrees with the plan.    Follow up: 2 weeks as we continue to taper     Patient was seen and examined additionally by [facility], RENATO Frausto.     Due to language barrier, an  was present during the history-taking and subsequent discussion (and for part of the physical exam) with this patient.        Aura Rosario PA-C  [facility]

## 2018-03-17 ENCOUNTER — MYC MEDICAL ADVICE (OUTPATIENT)
Dept: FAMILY MEDICINE | Facility: OTHER | Age: 28
End: 2018-03-17

## 2018-03-19 NOTE — TELEPHONE ENCOUNTER
Huddled with provider and ok to use dr only 3/20/18.  [employer] message sent to pt with appointment time. Maya Cardenas CMA

## 2018-03-20 ENCOUNTER — OFFICE VISIT (OUTPATIENT)
Dept: FAMILY MEDICINE | Facility: OTHER | Age: 28
End: 2018-03-20
Payer: MEDICARE

## 2018-03-20 VITALS
SYSTOLIC BLOOD PRESSURE: 114 MMHG | OXYGEN SATURATION: 100 % | DIASTOLIC BLOOD PRESSURE: 68 MMHG | TEMPERATURE: 98.9 F | BODY MASS INDEX: 28.01 KG/M2 | WEIGHT: 150.25 LBS | HEART RATE: 124 BPM | RESPIRATION RATE: 16 BRPM

## 2018-03-20 DIAGNOSIS — N89.8 VAGINAL DISCHARGE: ICD-10-CM

## 2018-03-20 DIAGNOSIS — M51.16 LUMBAR DISC DISEASE WITH RADICULOPATHY: ICD-10-CM

## 2018-03-20 DIAGNOSIS — Z98.1 S/P LUMBAR FUSION: ICD-10-CM

## 2018-03-20 DIAGNOSIS — S32.2XXK CLOSED FRACTURE OF COCCYX WITH NONUNION, SUBSEQUENT ENCOUNTER: ICD-10-CM

## 2018-03-20 DIAGNOSIS — G89.29 CHRONIC BILATERAL LOW BACK PAIN WITH LEFT-SIDED SCIATICA: Primary | ICD-10-CM

## 2018-03-20 DIAGNOSIS — M54.42 CHRONIC BILATERAL LOW BACK PAIN WITH LEFT-SIDED SCIATICA: Primary | ICD-10-CM

## 2018-03-20 LAB
SPECIMEN SOURCE: NORMAL
WET PREP SPEC: NORMAL

## 2018-03-20 PROCEDURE — 99214 OFFICE O/P EST MOD 30 MIN: CPT | Performed by: PHYSICIAN ASSISTANT

## 2018-03-20 PROCEDURE — 87210 SMEAR WET MOUNT SALINE/INK: CPT | Performed by: PHYSICIAN ASSISTANT

## 2018-03-20 RX ORDER — HYDROCODONE BITARTRATE AND ACETAMINOPHEN 5; 325 MG/1; MG/1
1-2 TABLET ORAL EVERY 4 HOURS PRN
Qty: 28 TABLET | Refills: 0 | Status: SHIPPED | OUTPATIENT
Start: 2018-03-23 | End: 2018-04-03

## 2018-03-20 ASSESSMENT — ANXIETY QUESTIONNAIRES
GAD7 TOTAL SCORE: 3
GAD7 TOTAL SCORE: 3
3. WORRYING TOO MUCH ABOUT DIFFERENT THINGS: NOT AT ALL
GAD7 TOTAL SCORE: 3
7. FEELING AFRAID AS IF SOMETHING AWFUL MIGHT HAPPEN: NOT AT ALL
6. BECOMING EASILY ANNOYED OR IRRITABLE: NOT AT ALL
7. FEELING AFRAID AS IF SOMETHING AWFUL MIGHT HAPPEN: NOT AT ALL
1. FEELING NERVOUS, ANXIOUS, OR ON EDGE: SEVERAL DAYS
5. BEING SO RESTLESS THAT IT IS HARD TO SIT STILL: SEVERAL DAYS
4. TROUBLE RELAXING: SEVERAL DAYS
2. NOT BEING ABLE TO STOP OR CONTROL WORRYING: NOT AT ALL

## 2018-03-20 ASSESSMENT — PATIENT HEALTH QUESTIONNAIRE - PHQ9
SUM OF ALL RESPONSES TO PHQ QUESTIONS 1-9: 1
SUM OF ALL RESPONSES TO PHQ QUESTIONS 1-9: 1
10. IF YOU CHECKED OFF ANY PROBLEMS, HOW DIFFICULT HAVE THESE PROBLEMS MADE IT FOR YOU TO DO YOUR WORK, TAKE CARE OF THINGS AT HOME, OR GET ALONG WITH OTHER PEOPLE: NOT DIFFICULT AT ALL

## 2018-03-20 NOTE — NURSING NOTE
"Chief Complaint   Patient presents with     Vaginal Problem     Recheck Medication       Initial /68 (BP Location: Right arm, Patient Position: Chair, Cuff Size: Adult Regular)  Pulse 124  Temp 98.9  F (37.2  C) (Temporal)  Resp 16  Wt 150 lb 4 oz (68.2 kg)  SpO2 100%  BMI 28.01 kg/m2 Estimated body mass index is 28.01 kg/(m^2) as calculated from the following:    Height as of 1/15/18: 5' 1.42\" (1.56 m).    Weight as of this encounter: 150 lb 4 oz (68.2 kg).  Medication Reconciliation: complete   Amber Tirado CMA      "

## 2018-03-20 NOTE — PROGRESS NOTES
Savana Burns    Your results were normal.     The results are attached for your review.       Zach Rosario PA-C

## 2018-03-20 NOTE — PROGRESS NOTES
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     HPI  Vaginal Symptoms-lump in vaginal area  Onset: 1 month ago    Description:  Vaginal Discharge: none   Itching (Pruritis): no   Burning sensation:  no   Odor: no     Accompanying Signs & Symptoms:  Pain with Urination: no   Abdominal Pain: no   Fever: no     History:   Sexually active: no   New Partner: no   Possibility of Pregnancy:  No    Precipitating factors:   Recent Antibiotic Use: no     Alleviating factors:  none  Therapies Tried and outcome: none    Back Pain Follow Up      Description:   Location of pain:  Left-lower  Character of pain: dull ache intermittent  Pain radiation: radiates into the left buttocks  Since last visit, pain is:  unchanged  New numbness or weakness in legs, not attributed to pain:  no     Intensity: Currently 4/10, moderate    History:   Pain interferes with job: Not applicable,   Therapies tried without relief: heat, muscle relaxants and opioids  Therapies tried with relief: heat, muscle relaxants and opioids     *patient would like a different pain medication less strong than percocet  - no withdrawal  - Just wants to stay at 2 per night for awhile   - Has enough percocet until Friday         Plan from last visit 3/12/18  - Currently on Percocet four tabs per day, having her typical withdrawal symptoms on the lower dose of oxycodone (diarrhea, abdominal cramps, restlessness, hard time sleeping) but pain is adequately controlled  - Continue with taper, cut down to 3 tabs per day for 1 week, then 2 tabs per day for 1 week  - Will increase clonidine during taper from one 0.1 tablet BID to two tablets BID  - Recheck in 2 weeks  -  reviewed, no concerns as expected            Problem list and histories reviewed & adjusted, as indicated.  Additional history: as documented      ROS:  Constitutional,  HEENT, cardiovascular, pulmonary, GI, , musculoskeletal, neuro, skin, endocrine and psych systems are negative, except as otherwise noted.    OBJECTIVE:   /68 (BP Location: Right arm, Patient Position: Chair, Cuff Size: Adult Regular)  Pulse 124  Temp 98.9  F (37.2  C) (Temporal)  Resp 16  Wt 150 lb 4 oz (68.2 kg)  SpO2 100%  BMI 28.01 kg/m2  Body mass index is 28.01 kg/(m^2).  GENERAL APPEARANCE: healthy, alert and no distress  EYES: Eyes grossly normal to inspection, PERRLA, conjunctivae and sclerae without injection or discharge, EOM intact   RESP: Lungs clear to auscultation - no rales, rhonchi or wheezes   CV: Regular rates and rhythm, normal S1 S2, no S3 or S4, no murmur, click or rub, no peripheral edema and peripheral pulses strong and symmetric bilaterally    (FEMALE): Normal female external genitalia, vaginal mucosa pink, moist, copious white vaginal discharge present, no skin lesions were identified   MS: No musculoskeletal defects are noted and gait is age appropriate without ataxia   SKIN: No suspicious lesions or rashes, hydration status appears adeuqate with normal skin turgor   PSYCH: Alert and oriented x3; speech- coherent , normal rate and volume; able to articulate logical thoughts, able to abstract reason, no tangential thoughts, no hallucinations or delusions, mentation appears normal, Mood is euthymic. Affect is appropriate for this mood state and bright. Thought content is free of suicidal ideation, hallucinations, and delusions. Dress is adequate and upkept. Eye contact is good during conversation.       Diagnostic Test Results:  Results for orders placed or performed in visit on 03/20/18   Wet prep   Result Value Ref Range    Specimen Description Vagina     Wet Prep No Trichomonas seen     Wet Prep No clue cells seen     Wet Prep No yeast seen      *Note: Due to a large number of results and/or encounters for the requested time period, some results have not been displayed. A  "complete set of results can be found in Results Review.         ASSESSMENT/PLAN:       ICD-10-CM    1. Chronic bilateral low back pain with left-sided sciatica M54.42 HYDROcodone-acetaminophen (NORCO) 5-325 MG per tablet    G89.29    2. Closed fracture of coccyx with nonunion, subsequent encounter S32.2XXK HYDROcodone-acetaminophen (NORCO) 5-325 MG per tablet   3. S/P lumbar fusion Z98.1 HYDROcodone-acetaminophen (NORCO) 5-325 MG per tablet   4. Lumbar disc disease with radiculopathy M51.16 HYDROcodone-acetaminophen (NORCO) 5-325 MG per tablet   5. Vaginal discharge N89.8 Wet prep     1-4. Pain  - Patient on taper plan after lumbar fusion, coccygeal fracture, and delayed wound healing   - Currently down to 2 percocet 5-325 mg at night (down from 4/day at last visit)   - Wishes to switch to something \"not as strong\"   - Will need to finish current rx which expires on Friday, then can switch to 2 Norco 5-325 mg at night   - Gave 2 week supply  - Recheck 2 weeks   - Expect to be able to stop refills at next appointment     5. Vaginal issues   - No mass, lump, or lesion on exam   - Did have copious white discharge, wet prep was obtained, no pathology on swab   - Patient to monitor     The patient indicates understanding of these issues and agrees with the plan.    Follow up: 2 weeks for pain recheck, also will recheck noa Tomlinson-[facility]  [facility]"

## 2018-03-20 NOTE — MR AVS SNAPSHOT
After Visit Summary   3/20/2018    Katy Islas    MRN: [id number]           Patient Information     Date Of Birth          1990        Visit Information        Provider Department      3/20/2018 1:45 PM Aura Rosario PA-C; [facility]        Today's Diagnoses     Chronic bilateral low back pain with left-sided sciatica    -  1    Closed fracture of coccyx with nonunion, subsequent encounter        S/P lumbar fusion        Lumbar disc disease with radiculopathy        Vaginal discharge          Care Instructions    - Continue Percocet 2 per day until Friday  - Then switch to Norco 2 per day   - Recheck 2 weeks               Follow-ups after your visit        Follow-up notes from your care team     Return in about 1 year (around 3/20/2019).      Who to contact     If you have questions or need follow up information about today's clinic visit or your schedule please contact [facility] directly at [phone].  Normal or non-critical lab and imaging results will be communicated to you by Dividehart, letter or phone within 4 business days after the clinic has received the results. If you do not hear from us within 7 days, please contact the clinic through youmagt or phone. If you have a critical or abnormal lab result, we will notify you by phone as soon as possible.  Submit refill requests through StockTwits or call your pharmacy and they will forward the refill request to us. Please allow 3 business days for your refill to be completed.          Additional Information About Your Visit        MyChart Information     StockTwits gives you secure access to your electronic health record. If you see a primary care provider, you can also send messages to your care team and make appointments. If you have questions, please call your primary care clinic.  If you do not have a primary care provider, please call [phone] and they will assist you.         Care EveryWhere ID     This is your Care EveryWhere ID. This could be used by other organizations to access your [place] medical records  [id number]        Your Vitals Were     Pulse Temperature Respirations Pulse Oximetry BMI (Body Mass Index)       124 98.9  F (37.2  C) (Temporal) 16 100% 28.01 kg/m2        Blood Pressure from Last 3 Encounters:   03/20/18 114/68   03/12/18 128/88   03/02/18 118/78    Weight from Last 3 Encounters:   03/20/18 150 lb 4 oz (68.2 kg)   03/12/18 149 lb (67.6 kg)   03/02/18 150 lb (68 kg)              We Performed the Following     Wet prep          Today's Medication Changes          These changes are accurate as of 3/20/18  2:19 PM.  If you have any questions, ask your nurse or doctor.               Start taking these medicines.        Dose/Directions    HYDROcodone-acetaminophen 5-325 MG per tablet   Commonly known as:  NORCO   Used for:  Chronic bilateral low back pain with left-sided sciatica, Closed fracture of coccyx with nonunion, subsequent encounter, S/P lumbar fusion, Lumbar disc disease with radiculopathy   Started by:  Aura Rosario PA-C        Dose:  1-2 tablet   Start taking on:  3/23/2018   Take 1-2 tablets by mouth every 4 hours as needed for moderate to severe pain maximum 2 tablet(s) per day   Quantity:  28 tablet   Refills:  0            Where to get your medicines      Some of these will need a paper prescription and others can be bought over the counter.  Ask your nurse if you have questions.     Bring a paper prescription for each of these medications     HYDROcodone-acetaminophen 5-325 MG per tablet                Primary Care Provider Office Phone # Fax #    Aura Rosario PA-C [phone]       [address]  [facility] [address]        Equal Access to Services     VIKY VILLANUEVA AH: Ruht Chen, wasabinada luqtania, qaybta kaalbenson ragladn, susan turner. So  [facility] [phone].    ATENCIÓN: Si maggi nathan, tiene a kerr disposición servicios gratuitos de asistencia lingüística. Casper craig [phone].    We comply with applicable federal civil rights laws and [place] laws. We do not discriminate on the basis of race, color, national origin, age, disability, sex, sexual orientation, or gender identity.            Thank you!     Thank you for choosing [facility]  for your care. Our goal is always to provide you with excellent care. Hearing back from our patients is one way we can continue to improve our services. Please take a few minutes to complete the written survey that you may receive in the mail after your visit with us. Thank you!             Your Updated Medication List - Protect others around you: Learn how to safely use, store and throw away your medicines at www.disposemymeds.org.          This list is accurate as of 3/20/18  2:19 PM.  Always use your most recent med list.                   Brand Name Dispense Instructions for use Diagnosis    ARIPiprazole 15 MG tablet    ABILIFY    30 tablet    Take 1 tablet (15 mg) by mouth At Bedtime    Adjustment disorder with mixed anxiety and depressed mood, Anxiety       cetirizine 10 MG tablet    zyrTEC     Take 10 mg by mouth daily        cloNIDine 0.1 MG tablet    CATAPRES    60 tablet    Take 2 tablets (0.2 mg) by mouth 2 times daily    Chronic pain syndrome, Chronic bilateral low back pain with left-sided sciatica, Postoperative pain, Anxiety, Mild major depression (H)       cyclobenzaprine 10 MG tablet    FLEXERIL    60 tablet    Take 2 tablets (20 mg) by mouth nightly as needed for muscle spasms or other (back pain)    Chronic bilateral low back pain with left-sided sciatica       dexlansoprazole 60 MG Cpdr CR capsule    DEXILANT    90 capsule    Take 1 capsule (60 mg) by mouth daily    Gastroesophageal reflux disease without esophagitis       diazepam 5 MG tablet    VALIUM    60 tablet    Take 1-2  tablets (5-10 mg) by mouth every 12 hours as needed for anxiety or sleep (MUSCLE SPASM) (one month supply)    Chronic pain syndrome, Chronic bilateral low back pain with left-sided sciatica, Lumbar disc disease with radiculopathy, S/P lumbar fusion       dicyclomine 20 MG tablet    BENTYL    30 tablet    Take 1 tablet (20 mg) by mouth 4 times daily as needed (ABDOMINAL CRAMPING) AS NEEDED FOR CRAMPING    Hx of Crohn's disease       DULoxetine 60 MG EC capsule    CYMBALTA    90 capsule    Take 1 capsule (60 mg) by mouth daily    Adjustment disorder with mixed anxiety and depressed mood       EPINEPHrine 0.3 MG/0.3ML injection 2-pack    EPIPEN/ADRENACLICK/or ANY BX GENERIC EQUIV    0.6 mL    Inject 0.3 mLs (0.3 mg) into the muscle once as needed for anaphylaxis    Bee sting allergy       gabapentin 300 MG capsule    NEURONTIN    90 capsule    Take 1 capsule (300 mg) by mouth 3 times daily    Chronic bilateral low back pain with left-sided sciatica       HYDROcodone-acetaminophen 5-325 MG per tablet   Start taking on:  3/23/2018    NORCO    28 tablet    Take 1-2 tablets by mouth every 4 hours as needed for moderate to severe pain maximum 2 tablet(s) per day    Chronic bilateral low back pain with left-sided sciatica, Closed fracture of coccyx with nonunion, subsequent encounter, S/P lumbar fusion, Lumbar disc disease with radiculopathy       meclizine 12.5 MG tablet    ANTIVERT    30 tablet    Take 1 tablet (12.5 mg) by mouth 4 times daily as needed for dizziness    Dizziness       medical cannabis inhalation (Patient's own supply.  Not a prescription)      Inhale into the lungs 3 times daily as needed Reported on 3/28/2017        medroxyPROGESTERone 150 MG/ML injection    DEPO-PROVERA    3 mL    Inject 1 mL (150 mg) into the muscle every 3 months    Encounter for surveillance of injectable contraceptive, Dysmenorrhea       naloxone nasal spray    NARCAN    0.2 mL    Spray 1 spray (4 mg) into one nostril alternating  nostrils as needed for opioid reversal every 2-3 minutes until assistance arrives    Chronic pain syndrome, Chronic bilateral low back pain with left-sided sciatica, S/P lumbar fusion, Lumbar disc disease with radiculopathy       NONFORMULARY      Apply 30 mLs topically 4 times daily        polyethylene glycol Packet    MIRALAX/GLYCOLAX     Take 1 packet by mouth daily        promethazine 6.25 MG/5ML syrup    PHENERGAN    560 mL    Take 10 mLs (12.5 mg) by mouth 4 times daily as needed for nausea    Nausea       rizatriptan 5 MG ODT tab    MAXALT-MLT    18 tablet    Take 1-2 tablets (5-10 mg) by mouth at onset of headache for migraine May repeat in 2 hours. Max 6 tablets/24 hours.    Migraine with aura and without status migrainosus, not intractable

## 2018-03-20 NOTE — PATIENT INSTRUCTIONS
- Continue Percocet 2 per day until Friday  - Then switch to Norco 2 per day   - Recheck 2 weeks

## 2018-03-21 ASSESSMENT — ANXIETY QUESTIONNAIRES: GAD7 TOTAL SCORE: 3

## 2018-03-21 ASSESSMENT — PATIENT HEALTH QUESTIONNAIRE - PHQ9: SUM OF ALL RESPONSES TO PHQ QUESTIONS 1-9: 1

## 2018-03-25 ENCOUNTER — MYC MEDICAL ADVICE (OUTPATIENT)
Dept: FAMILY MEDICINE | Facility: OTHER | Age: 28
End: 2018-03-25

## 2018-03-25 DIAGNOSIS — F43.23 ADJUSTMENT DISORDER WITH MIXED ANXIETY AND DEPRESSED MOOD: ICD-10-CM

## 2018-03-25 DIAGNOSIS — F41.9 ANXIETY: ICD-10-CM

## 2018-03-25 DIAGNOSIS — M54.42 CHRONIC BILATERAL LOW BACK PAIN WITH LEFT-SIDED SCIATICA: ICD-10-CM

## 2018-03-25 DIAGNOSIS — G89.29 CHRONIC BILATERAL LOW BACK PAIN WITH LEFT-SIDED SCIATICA: ICD-10-CM

## 2018-03-26 ENCOUNTER — OFFICE VISIT (OUTPATIENT)
Dept: FAMILY MEDICINE | Facility: OTHER | Age: 28
End: 2018-03-26
Payer: MEDICARE

## 2018-03-26 ENCOUNTER — MYC MEDICAL ADVICE (OUTPATIENT)
Dept: FAMILY MEDICINE | Facility: OTHER | Age: 28
End: 2018-03-26

## 2018-03-26 VITALS
HEART RATE: 136 BPM | DIASTOLIC BLOOD PRESSURE: 82 MMHG | SYSTOLIC BLOOD PRESSURE: 114 MMHG | TEMPERATURE: 99.2 F | OXYGEN SATURATION: 100 % | WEIGHT: 159 LBS | BODY MASS INDEX: 29.64 KG/M2 | RESPIRATION RATE: 16 BRPM

## 2018-03-26 DIAGNOSIS — M54.42 CHRONIC BILATERAL LOW BACK PAIN WITH LEFT-SIDED SCIATICA: ICD-10-CM

## 2018-03-26 DIAGNOSIS — Z98.1 S/P LUMBAR FUSION: ICD-10-CM

## 2018-03-26 DIAGNOSIS — M51.16 LUMBAR DISC DISEASE WITH RADICULOPATHY: ICD-10-CM

## 2018-03-26 DIAGNOSIS — G89.4 CHRONIC PAIN SYNDROME: ICD-10-CM

## 2018-03-26 DIAGNOSIS — G89.29 CHRONIC BILATERAL LOW BACK PAIN WITH LEFT-SIDED SCIATICA: ICD-10-CM

## 2018-03-26 PROCEDURE — 99214 OFFICE O/P EST MOD 30 MIN: CPT | Performed by: PHYSICIAN ASSISTANT

## 2018-03-26 RX ORDER — ARIPIPRAZOLE 15 MG/1
15 TABLET ORAL AT BEDTIME
Qty: 30 TABLET | Refills: 3 | Status: SHIPPED | OUTPATIENT
Start: 2018-03-26 | End: 2018-07-23

## 2018-03-26 RX ORDER — CYCLOBENZAPRINE HCL 10 MG
10 TABLET ORAL 2 TIMES DAILY PRN
Qty: 60 TABLET | Refills: 3 | Status: SHIPPED | OUTPATIENT
Start: 2018-03-26 | End: 2018-06-12

## 2018-03-26 RX ORDER — GABAPENTIN 300 MG/1
300-900 CAPSULE ORAL 3 TIMES DAILY
Qty: 120 CAPSULE | Refills: 3 | Status: SHIPPED | OUTPATIENT
Start: 2018-03-26 | End: 2019-02-22

## 2018-03-26 RX ORDER — DIAZEPAM 10 MG
10 TABLET ORAL EVERY 12 HOURS PRN
Qty: 60 TABLET | Refills: 5 | Status: SHIPPED | OUTPATIENT
Start: 2018-03-26 | End: 2018-08-21

## 2018-03-26 ASSESSMENT — ANXIETY QUESTIONNAIRES
GAD7 TOTAL SCORE: 7
2. NOT BEING ABLE TO STOP OR CONTROL WORRYING: SEVERAL DAYS
IF YOU CHECKED OFF ANY PROBLEMS ON THIS QUESTIONNAIRE, HOW DIFFICULT HAVE THESE PROBLEMS MADE IT FOR YOU TO DO YOUR WORK, TAKE CARE OF THINGS AT HOME, OR GET ALONG WITH OTHER PEOPLE: SOMEWHAT DIFFICULT
7. FEELING AFRAID AS IF SOMETHING AWFUL MIGHT HAPPEN: NOT AT ALL
5. BEING SO RESTLESS THAT IT IS HARD TO SIT STILL: SEVERAL DAYS
6. BECOMING EASILY ANNOYED OR IRRITABLE: MORE THAN HALF THE DAYS
3. WORRYING TOO MUCH ABOUT DIFFERENT THINGS: SEVERAL DAYS
1. FEELING NERVOUS, ANXIOUS, OR ON EDGE: SEVERAL DAYS

## 2018-03-26 ASSESSMENT — PAIN SCALES - GENERAL: PAINLEVEL: SEVERE PAIN (6)

## 2018-03-26 ASSESSMENT — PATIENT HEALTH QUESTIONNAIRE - PHQ9: 5. POOR APPETITE OR OVEREATING: SEVERAL DAYS

## 2018-03-26 NOTE — PATIENT INSTRUCTIONS
- Valium - increased to 10 mg, take 1 tablet twice a day  - Restart Flexeril - 1 tablet three times a day   - Gabapentin 1-3 tablets up to 3 times a day     - Top dose of tylenol 3,000 mg      2 extra strength (500 mg) tablet three times a day for next 5-7 days     - OTC gels/creams or patches     - Continue with ice, heat, and stretches

## 2018-03-26 NOTE — PROGRESS NOTES
SUBJECTIVE:   Katy Islas is a 28 year old female who presents to clinic today for the following health issues:      HPI  Chronic Pain Follow-Up       Type / Location of Pain: low back and right side neck  Analgesia/pain control:       Recent changes:  worse      Overall control: Inadequate pain control  Activity level/function:      Daily activities:  Able to do light housework, cooking    Work:  not applicable  Adverse effects:  No  Adherance    Taking medication as directed?  Yes    Participating in other treatments: no -   Risk Factors:    Sleep:  Poor    Mood/anxiety:  worsened    Recent family or social stressors:  recent move    Other aggravating factors: none     - Pt states lost meds she just refilled for the abilify and the flexeril - would also like to go back to [facility] states the norco does not work at all for pain  - Move went well, had help   - Lots of stuff  - No sleep for past 3 days   - Move made her back hurt   - Hot bath, ice,   - No flexeril, can't find it   - Did find 5 and 10 mg of Abilify so can continue to take until gets filled   - Diazepam - two at night, doesn't help   - Tried to take 3 of them, but still had lots of pain   - Destroyed the [place]         PHQ-9 SCORE 2/20/2018 3/2/2018 3/20/2018   Total Score - - -   Total Score MyChart 2 (Minimal depression) 4 (Minimal depression) 1 (Minimal depression)   Total Score 2 4 1     JULIETH-7 SCORE 1/29/2018 2/20/2018 3/20/2018   Total Score - - -   Total Score 21 (severe anxiety) 3 (minimal anxiety) 3 (minimal anxiety)   Total Score 21 3 3     Encounter-Level CSA - 04/25/2017:          Controlled Substance Agreement - Scan on 5/1/2017 11:06 AM : CONTROLLED SUBSTANCE AGREEMENT (below)            Encounter-Level CSA - 04/25/2017:          Controlled Substance Agreement - Scan on 1/26/2015  9:14 AM : Controlled Medication Agreement-01/12/15 (below)              Problem list and histories reviewed & adjusted, as indicated.  Additional history:  as documented    ROS:  Constitutional, HEENT, cardiovascular, pulmonary, gi and gu systems are negative, except as otherwise noted.    OBJECTIVE:     /82 (BP Location: Right arm, Patient Position: Chair, Cuff Size: Adult Regular)  Pulse 136  Temp 99.2  F (37.3  C) (Oral)  Resp 16  Wt 159 lb (72.1 kg)  SpO2 100%  BMI 29.64 kg/m2  Body mass index is 29.64 kg/(m^2).  GENERAL APPEARANCE: healthy, alert and no distress  EYES: Eyes grossly normal to inspection, PERRLA, conjunctivae and sclerae without injection or discharge, EOM intact   MS: No musculoskeletal defects are noted and gait is age appropriate without ataxia   SKIN: No suspicious lesions or rashes, hydration status appears adeuqate with normal skin turgor   BACK: Bilateral paralumbar tenderness, no midline tenderness, decreased ROM due to pain   PSYCH: Alert and oriented x3; speech- coherent , normal rate and volume; able to articulate logical thoughts, able to abstract reason, no tangential thoughts, no hallucinations or delusions, mentation appears normal, Mood is euthymic. Affect is appropriate for this mood state and bright. Thought content is free of suicidal ideation, hallucinations, and delusions. Dress is adequate and upkept. Eye contact is good during conversation.       Diagnostic Test Results:  none     ASSESSMENT/PLAN:       ICD-10-CM    1. Chronic pain syndrome G89.4 diazepam (VALIUM) 10 MG tablet   2. Chronic bilateral low back pain with left-sided sciatica M54.42 diazepam (VALIUM) 10 MG tablet    G89.29 gabapentin (NEURONTIN) 300 MG capsule   3. Lumbar disc disease with radiculopathy M51.16 diazepam (VALIUM) 10 MG tablet   4. S/P lumbar fusion Z98.1 diazepam (VALIUM) 10 MG tablet     - We had been tapering off patient's pain medications since her 2 surgeries and subsequent tunneling wound that required a wound vac  - Our las appointment was 3/23, just 3 days ago  - Patient was given 2 weeks of Norco 5-325 mg at 2 per day to complete  our taper   - However, over the weekend patient moved into a new home, she did have help  - Discussed acute strain due to heavy lifting   - Recommended in MyChart that patient increase to 3 Norco per day, at first patient stated yes did that but didn't work, then stated destroyed all of them because they didn't work   - Discussed doing this without my knowledge or discussion was a violation of her CSA   - Discussed I will no longer prescribe narcotics for her, they are no longer indicated  - Discussed need for conservative therapy for her back  - Did offer the following alternative advise for patient in setting of acute pain          - Valium - increased to 10 mg, take 1 tablet twice a day (previously taking only once per day)          - Restart Flexeril - 1 tablet three times a day          - Gabapentin 1 tablet morning and noon, can take 3 at night           - Top dose of tylenol 3,000 mg      2 extra strength (500 mg) tablet three times a day for next 5-7 days, then as needed (can't have NSAIDs due to ulcer)         - OTC gels/creams or patches           - Continue with ice, heat, and stretches   - Recommend starting with Tylenol, Flexeril, and Gabapentin, if still can't sleep can add in the Valium       Advised if any over sedation to cut back   - Patient kept asking for Ambien because just can't sleep - advised that increasing Gabapentin from 1-3 tablets at night should help with that and that I would not recommend anything further at this time   - Advised above all else, needs rest and time   - Recheck 1-2 weeks   - Reviewed all medications at length, including risks of sedation (patient doesn't drive)     The patient indicates understanding of these issues and agrees with the plan.    Follow up: 1-2 week for back pain recheck     Due to language barrier, an  was present during the history-taking and subsequent discussion (and for part of the physical exam) with this patient.      Aura KLEIN  LOVE Rosario  [facility]

## 2018-03-26 NOTE — NURSING NOTE
"Chief Complaint   Patient presents with     RECHECK       Initial /82 (BP Location: Right arm, Patient Position: Chair, Cuff Size: Adult Regular)  Pulse 136  Temp 99.2  F (37.3  C) (Oral)  Resp 16  Wt 159 lb (72.1 kg)  SpO2 100%  BMI 29.64 kg/m2 Estimated body mass index is 29.64 kg/(m^2) as calculated from the following:    Height as of 1/15/18: 5' 1.42\" (1.56 m).    Weight as of this encounter: 159 lb (72.1 kg).  Medication Reconciliation: complete  "

## 2018-03-26 NOTE — TELEPHONE ENCOUNTER
Patient is scheduled for 2:00pm today. Left message for patient to return our phone call. Also replied via [employer].  Melissa Villalpando CMA

## 2018-03-26 NOTE — TELEPHONE ENCOUNTER
Sent patient a MyChart as she was placed on today's schedule. I will not be prescribing any more narcotics as she just got a fill on 3/23/18 (last week). Advised increase of Norco to 3/day and continue her Flexeril and Valium. I will still see her today, but I will not switch her to high dose Percocet from 2 Norco per day. No narcotics will be prescribed.     Zach Rosario PA-C  [facility]

## 2018-03-26 NOTE — MR AVS SNAPSHOT
After Visit Summary   3/26/2018    Katy Islas    MRN: [id number]           Patient Information     Date Of Birth          1990        Visit Information        Provider Department      3/26/2018 1:30 PM Aura Rosario PA-C; [facility]        Today's Diagnoses     Chronic pain syndrome        Chronic bilateral low back pain with left-sided sciatica        Lumbar disc disease with radiculopathy        S/P lumbar fusion          Care Instructions    - Valium - increased to 10 mg, take 1 tablet twice a day  - Restart Flexeril - 1 tablet three times a day   - Gabapentin 1-3 tablets up to 3 times a day     - Top dose of tylenol 3,000 mg      2 extra strength (500 mg) tablet three times a day for next 5-7 days     - OTC gels/creams or patches     - Continue with ice, heat, and stretches             Follow-ups after your visit        Who to contact     If you have questions or need follow up information about today's clinic visit or your schedule please contact [facility] directly at [phone].  Normal or non-critical lab and imaging results will be communicated to you by [employer], letter or phone within 4 business days after the clinic has received the results. If you do not hear from us within 7 days, please contact the clinic through Ring[employer] or phone. If you have a critical or abnormal lab result, we will notify you by phone as soon as possible.  Submit refill requests through BagThat or call your pharmacy and they will forward the refill request to us. Please allow 3 business days for your refill to be completed.          Additional Information About Your Visit        MyChart Information     BagThat gives you secure access to your electronic health record. If you see a primary care provider, you can also send messages to your care team and make appointments. If you have questions, please call your primary care clinic.  If you do not  have a primary care provider, please call [phone] and they will assist you.        Care EveryWhere ID     This is your Care EveryWhere ID. This could be used by other organizations to access your [place] medical records  [id number]        Your Vitals Were     Pulse Temperature Respirations Pulse Oximetry BMI (Body Mass Index)       136 99.2  F (37.3  C) (Oral) 16 100% 29.64 kg/m2        Blood Pressure from Last 3 Encounters:   03/26/18 114/82   03/20/18 114/68   03/12/18 128/88    Weight from Last 3 Encounters:   03/26/18 159 lb (72.1 kg)   03/20/18 150 lb 4 oz (68.2 kg)   03/12/18 149 lb (67.6 kg)              Today, you had the following     No orders found for display         Today's Medication Changes          These changes are accurate as of 3/26/18  2:40 PM.  If you have any questions, ask your nurse or doctor.               These medicines have changed or have updated prescriptions.        Dose/Directions    diazepam 10 MG tablet   Commonly known as:  VALIUM   This may have changed:    - medication strength  - how much to take   Used for:  Chronic pain syndrome, Chronic bilateral low back pain with left-sided sciatica, Lumbar disc disease with radiculopathy, S/P lumbar fusion   Changed by:  Aura Rosario PA-C        Dose:  10 mg   Take 1 tablet (10 mg) by mouth every 12 hours as needed for anxiety or sleep (MUSCLE SPASM) (one month supply)   Quantity:  60 tablet   Refills:  5       gabapentin 300 MG capsule   Commonly known as:  NEURONTIN   This may have changed:  how much to take   Used for:  Chronic bilateral low back pain with left-sided sciatica   Changed by:  Aura Rosario PA-C        Dose:  300-900 mg   Take 1-3 capsules (300-900 mg) by mouth 3 times daily   Quantity:  120 capsule   Refills:  3            Where to get your medicines      [facility] [address] [place] [address] [address] [facility] [address]  [address] [facility] [address]     Phone:  [phone]     gabapentin 300 MG capsule         Some of these will need a paper prescription and others can be bought over the counter.  Ask your nurse if you have questions.     Bring a paper prescription for each of these medications     diazepam 10 MG tablet                Primary Care Provider Office Phone # Fax #    Aura DIAZ LOVE Rosario [phone]       [address] [place] [address] [address]  [facility] [address]        Equal Access to Services     [facility]: Hadii aad ku hadasho Soomaali, waaxda luqadaha, qaybta kaalmada adeegyada, waxay calebin hayyani santo . So [facility] [place] [facility] [phone].    ATENCIÓN: Si habla español, tiene a kerr disposición servicios gratuitos de asistencia lingüística. [facility] [phone].    We comply with applicable federal civil rights laws and [place] laws. We do not discriminate on the basis of race, color, national origin, age, disability, sex, sexual orientation, or gender identity.            Thank you!     Thank you for choosing [facility]  for your care. Our goal is always to provide you with excellent care. Hearing back from our patients is one way we can continue to improve our services. Please take a few minutes to complete the written survey that you may receive in the mail after your visit with us. Thank you!             Your Updated Medication List - Protect others around you: Learn how to safely use, store and throw away your medicines at www.disposemymeds.org.          This list is accurate as of 3/26/18  2:40 PM.  Always use your most recent med list.                   Brand Name Dispense Instructions for use Diagnosis    ARIPiprazole 15 MG tablet    ABILIFY    30 tablet    Take 1 tablet (15 mg) by mouth At Bedtime    Adjustment disorder with mixed anxiety and depressed mood, Anxiety       cetirizine 10 MG tablet    zyrTEC     Take 10 mg by mouth daily         cloNIDine 0.1 MG tablet    CATAPRES    60 tablet    Take 2 tablets (0.2 mg) by mouth 2 times daily    Chronic pain syndrome, Chronic bilateral low back pain with left-sided sciatica, Postoperative pain, Anxiety, Mild major depression (H)       cyclobenzaprine 10 MG tablet    FLEXERIL    60 tablet    Take 1 tablet (10 mg) by mouth 2 times daily as needed for muscle spasms or other (back pain)    Chronic bilateral low back pain with left-sided sciatica       dexlansoprazole 60 MG Cpdr CR capsule    DEXILANT    90 capsule    Take 1 capsule (60 mg) by mouth daily    Gastroesophageal reflux disease without esophagitis       diazepam 10 MG tablet    VALIUM    60 tablet    Take 1 tablet (10 mg) by mouth every 12 hours as needed for anxiety or sleep (MUSCLE SPASM) (one month supply)    Chronic pain syndrome, Chronic bilateral low back pain with left-sided sciatica, Lumbar disc disease with radiculopathy, S/P lumbar fusion       dicyclomine 20 MG tablet    BENTYL    30 tablet    Take 1 tablet (20 mg) by mouth 4 times daily as needed (ABDOMINAL CRAMPING) AS NEEDED FOR CRAMPING    Hx of Crohn's disease       DULoxetine 60 MG EC capsule    CYMBALTA    90 capsule    Take 1 capsule (60 mg) by mouth daily    Adjustment disorder with mixed anxiety and depressed mood       EPINEPHrine 0.3 MG/0.3ML injection 2-pack    EPIPEN/ADRENACLICK/or ANY BX GENERIC EQUIV    0.6 mL    Inject 0.3 mLs (0.3 mg) into the muscle once as needed for anaphylaxis    Bee sting allergy       gabapentin 300 MG capsule    NEURONTIN    120 capsule    Take 1-3 capsules (300-900 mg) by mouth 3 times daily    Chronic bilateral low back pain with left-sided sciatica       HYDROcodone-acetaminophen 5-325 MG per tablet    NORCO    28 tablet    Take 1-2 tablets by mouth every 4 hours as needed for moderate to severe pain maximum 2 tablet(s) per day    Chronic bilateral low back pain with left-sided sciatica, Closed fracture of coccyx with nonunion, subsequent  encounter, S/P lumbar fusion, Lumbar disc disease with radiculopathy       meclizine 12.5 MG tablet    ANTIVERT    30 tablet    Take 1 tablet (12.5 mg) by mouth 4 times daily as needed for dizziness    Dizziness       medical cannabis inhalation (Patient's own supply.  Not a prescription)      Inhale into the lungs 3 times daily as needed Reported on 3/28/2017        medroxyPROGESTERone 150 MG/ML injection    DEPO-PROVERA    3 mL    Inject 1 mL (150 mg) into the muscle every 3 months    Encounter for surveillance of injectable contraceptive, Dysmenorrhea       naloxone nasal spray    NARCAN    0.2 mL    Spray 1 spray (4 mg) into one nostril alternating nostrils as needed for opioid reversal every 2-3 minutes until assistance arrives    Chronic pain syndrome, Chronic bilateral low back pain with left-sided sciatica, S/P lumbar fusion, Lumbar disc disease with radiculopathy       NONFORMULARY      Apply 30 mLs topically 4 times daily        polyethylene glycol Packet    MIRALAX/GLYCOLAX     Take 1 packet by mouth daily        promethazine 6.25 MG/5ML syrup    PHENERGAN    560 mL    Take 10 mLs (12.5 mg) by mouth 4 times daily as needed for nausea    Nausea       rizatriptan 5 MG ODT tab    MAXALT-MLT    18 tablet    Take 1-2 tablets (5-10 mg) by mouth at onset of headache for migraine May repeat in 2 hours. Max 6 tablets/24 hours.    Migraine with aura and without status migrainosus, not intractable

## 2018-03-26 NOTE — TELEPHONE ENCOUNTER
Can not prescribe another narcotic without an appointment. I can try resending the Abilify and flexeril, but its not likely to be the pharmacy but her insurance that won't allow the early fill     Zach Rosario PA-C   [place]  [place]

## 2018-03-27 ASSESSMENT — ANXIETY QUESTIONNAIRES: GAD7 TOTAL SCORE: 7

## 2018-03-27 ASSESSMENT — PATIENT HEALTH QUESTIONNAIRE - PHQ9: SUM OF ALL RESPONSES TO PHQ QUESTIONS 1-9: 7

## 2018-03-29 ENCOUNTER — MYC MEDICAL ADVICE (OUTPATIENT)
Dept: FAMILY MEDICINE | Facility: OTHER | Age: 28
End: 2018-03-29

## 2018-03-30 NOTE — PROGRESS NOTES
SUBJECTIVE:   Katy Islas is a 28 year old female who presents to clinic today for the following health issues:    HPI  GERD/Heartburn  Onset: x1 week     Description:     Burning in chest: YES    Intensity: moderate    Progression of Symptoms: worsening    Accompanying Signs & Symptoms:  Does it feel like food gets stuck: YES- sometimes  Nausea: YES- sometimes  Vomiting (bloody?): no  Abdominal Pain: YES- cramping  Black-Tarry stools: no:  Bloody stools: no    History:   Previous ulcers: YES    Precipitating factors:   Caffeine use: YES  Alcohol use: no  NSAID/Aspirin use: no  Tobacco use: YES  Worse with acidic food .    Alleviating factors:   Therapies Tried and outcome: Dexilant (refills sent 3/2/18), Tums, Calmicid   - GI has not called patient back   - Has been forgetting to take       Back Pain Follow Up    Description:   Location of pain:  bilateral  Character of pain: dull ache  Pain radiation: left leg   Since last visit, pain is:  improved  New numbness or weakness in legs, not attributed to pain:  no     Intensity: Currently 5/10    History:   Pain interferes with job: Not applicable  Therapies tried without relief: acetaminophen (Tylenol)  Therapies tried with relief: Many      - Leaving for [place] on 6th for 2 weeks   - Still there but better   - Hard to sleep  - Gabapentin 1,1, and 3   - Started Flexeril   - Doing Tylenol   - Valium only at night   - Still tosses and turns         - Plan from visit 3/26/18      No pain medications were given     Did offer the following alternative advise for patient in setting of acute pain          - Valium - increased to 10 mg, take 1 tablet twice a day (previously taking only once per day)          - Restart Flexeril - 1 tablet three times a day          - Gabapentin 1 tablet morning and noon, can take 3 at night           - Top dose of tylenol 3,000 mg      2 extra strength (500 mg) tablet three times a day for next 5-7 days, then as needed (can't have  NSAIDs due to ulcer)         - OTC gels/creams or patches           - Continue with ice, heat, and stretches   - Recommend starting with Tylenol, Flexeril, and Gabapentin, if still can't sleep can add in the Valium       Advised if any over sedation to cut back   - Patient kept asking for Ambien because just can't sleep - advised that increasing Gabapentin from 1-3 tablets at night should help with that and that I would not recommend anything further at this time   - Advised above all else, needs rest and time   - Recheck 1-2 weeks     Problem list and histories reviewed & adjusted, as indicated.  Additional history: as documented      ROS:  Constitutional, HEENT, cardiovascular, pulmonary, gi and gu systems are negative, except as otherwise noted.    OBJECTIVE:   /84  Pulse 106  Temp 99  F (37.2  C) (Temporal)  Resp 18  Wt 162 lb (73.5 kg)  SpO2 97%  BMI 30.2 kg/m2  Body mass index is 30.2 kg/(m^2).  GENERAL APPEARANCE: healthy, alert and no distress  EYES: Eyes grossly normal to inspection, PERRLA, conjunctivae and sclerae without injection or discharge, EOM intact   RESP: Lungs clear to auscultation - no rales, rhonchi or wheezes    CV: Regular rates and rhythm, normal S1 S2, no S3 or S4, no murmur, click or rub, no peripheral edema and peripheral pulses strong and symmetric bilaterally   MS: No musculoskeletal defects are noted and gait is age appropriate without ataxia   SKIN: No suspicious lesions or rashes, hydration status appears adeuqate with normal skin turgor   PSYCH: Alert and oriented x3; speech- coherent , normal rate and volume; able to articulate logical thoughts, able to abstract reason, no tangential thoughts, no hallucinations or delusions, mentation appears normal, Mood is euthymic. Affect is appropriate for this mood state and bright. Thought content is free of suicidal ideation, hallucinations, and delusions. Dress is adequate and upkept. Eye contact is good during conversation.        Diagnostic Test Results:  none     ASSESSMENT/PLAN:       ICD-10-CM    1. Chronic pain syndrome G89.4 oxyCODONE-acetaminophen (PERCOCET) 5-325 MG per tablet   2. S/P lumbar fusion Z98.1 oxyCODONE-acetaminophen (PERCOCET) 5-325 MG per tablet   3. Lumbar disc disease with radiculopathy M51.16 oxyCODONE-acetaminophen (PERCOCET) 5-325 MG per tablet   4. Closed fracture of coccyx with nonunion, subsequent encounter S32.2XXK oxyCODONE-acetaminophen (PERCOCET) 5-325 MG per tablet   5. Gastroesophageal reflux disease without esophagitis K21.9 dexlansoprazole (DEXILANT) 60 MG CPDR CR capsule   6. Mild persistent asthma without complication J45.30 albuterol (PROAIR HFA/PROVENTIL HFA/VENTOLIN HFA) 108 (90 BASE) MCG/ACT Inhaler     albuterol (2.5 MG/3ML) 0.083% neb solution     cetirizine (ZYRTEC) 10 MG tablet   7. Bee sting allergy Z91.038 EPINEPHrine (EPIPEN/ADRENACLICK/OR ANY BX GENERIC EQUIV) 0.3 MG/0.3ML injection 2-pack   8. Hx of Crohn's disease Z87.19 dicyclomine (BENTYL) 20 MG tablet   9. Nausea R11.0 promethazine (PHENERGAN) 6.25 MG/5ML syrup     1-4. Pain   - Last seen for this 3/26/18 (1 week)   - At that visit, patient reported destroying Norco because didn't work and nothing further was given   - Has been doing Gabapentin, Flexeril, and Tylenol, no NSAIDs due to history of GI bleed and ulcers   - Worried about her drive to [place] and the pain   - Discussed I will give her #25 Percocet and nothing further, this is her last RX of pain medication as we are now far enough out that she shouldn't need it   - can continue the rest as above, work on tapering off those in the future  - Continue with physical therapy and daily at home stretching/strengthening exercises     - Recommend due to worsening GERD and Crohn's patient continue with plan to establish with GI specialist   - Discussed importance of taking Dexilant regularly, hasn't been thus the worsening over the last week   Gave number again - [facility], patient  needs to call to make this appointment   - Reviewed rest of medications at length, reviewed use and side effects, refilled as needed     The patient indicates understanding of these issues and agrees with the plan.    Follow up: 2-3 months for mood recheck and to assist if needs help tapering off rest of medications for her back      Due to language barrier, an  was present during the history-taking and subsequent discussion (and for part of the physical exam) with this patient.        Aura Rosario PA-C  [facility]

## 2018-03-30 NOTE — TELEPHONE ENCOUNTER
No available appointments other than Dr. Only and phone visit - Could we do the 2-230 DR only and block the 3 dr only as well?  Abimbola Castro, CMA

## 2018-03-30 NOTE — TELEPHONE ENCOUNTER
No, I can not see her on Monday. I can only see her 3 pm on Tuesday as I need at least a 45 min. Please contact patient to see what she needs refilled as most have refills and she can't have pain medication until 4/6/18.     Zach Rosario PA-C  [facility]

## 2018-03-31 ENCOUNTER — MYC MEDICAL ADVICE (OUTPATIENT)
Dept: FAMILY MEDICINE | Facility: OTHER | Age: 28
End: 2018-03-31

## 2018-03-31 DIAGNOSIS — J45.30 MILD PERSISTENT ASTHMA WITHOUT COMPLICATION: ICD-10-CM

## 2018-04-02 RX ORDER — ALBUTEROL SULFATE 0.83 MG/ML
1 SOLUTION RESPIRATORY (INHALATION) EVERY 6 HOURS PRN
Qty: 50 VIAL | Refills: 1 | Status: SHIPPED | OUTPATIENT
Start: 2018-04-02 | End: 2018-04-03

## 2018-04-02 RX ORDER — ALBUTEROL SULFATE 90 UG/1
2 AEROSOL, METERED RESPIRATORY (INHALATION) EVERY 6 HOURS PRN
Qty: 1 INHALER | Refills: 1 | Status: SHIPPED | OUTPATIENT
Start: 2018-04-02 | End: 2018-04-03

## 2018-04-02 NOTE — TELEPHONE ENCOUNTER
"Requested Prescriptions   Pending Prescriptions Disp Refills     albuterol (2.5 MG/3ML) 0.083% neb solution 50 vial 1     Sig: Take 1 vial (2.5 mg) by nebulization every 6 hours as needed for shortness of breath / dyspnea or wheezing    Asthma Maintenance Inhalers - Anticholinergics Passed    4/2/2018  9:01 AM       Passed - Patient is age 12 years or older       Passed - Asthma control assessment score within normal limits in last 6 months    Please review ACT score.          Passed - Recent (6 mo) or future (30 days) visit within the authorizing provider's specialty    Patient had office visit in the last 6 months or has a visit in the next 30 days with authorizing provider or within the authorizing provider's specialty.  See \"Patient Info\" tab in inbasket, or \"Choose Columns\" in Meds & Orders section of the refill encounter.            albuterol (PROAIR HFA/PROVENTIL HFA/VENTOLIN HFA) 108 (90 BASE) MCG/ACT Inhaler 1 Inhaler 1     Sig: Inhale 2 puffs into the lungs every 6 hours as needed for shortness of breath / dyspnea or wheezing    Asthma Maintenance Inhalers - Anticholinergics Passed    4/2/2018  9:01 AM       Passed - Patient is age 12 years or older       Passed - Asthma control assessment score within normal limits in last 6 months    Please review ACT score.          Passed - Recent (6 mo) or future (30 days) visit within the authorizing provider's specialty    Patient had office visit in the last 6 months or has a visit in the next 30 days with authorizing provider or within the authorizing provider's specialty.  See \"Patient Info\" tab in inbasket, or \"Choose Columns\" in Meds & Orders section of the refill encounter.              Prescription approved per [facility] Refill Protocol.  Iggy Bernstein, RN, BSN        "

## 2018-04-03 ENCOUNTER — TELEPHONE (OUTPATIENT)
Dept: FAMILY MEDICINE | Facility: OTHER | Age: 28
End: 2018-04-03

## 2018-04-03 ENCOUNTER — OFFICE VISIT (OUTPATIENT)
Dept: FAMILY MEDICINE | Facility: OTHER | Age: 28
End: 2018-04-03
Payer: MEDICARE

## 2018-04-03 VITALS
BODY MASS INDEX: 30.2 KG/M2 | SYSTOLIC BLOOD PRESSURE: 132 MMHG | DIASTOLIC BLOOD PRESSURE: 84 MMHG | WEIGHT: 162 LBS | RESPIRATION RATE: 18 BRPM | TEMPERATURE: 99 F | HEART RATE: 106 BPM | OXYGEN SATURATION: 97 %

## 2018-04-03 DIAGNOSIS — Z87.19 HX OF CROHN'S DISEASE: ICD-10-CM

## 2018-04-03 DIAGNOSIS — Z91.030 BEE STING ALLERGY: ICD-10-CM

## 2018-04-03 DIAGNOSIS — K21.9 GASTROESOPHAGEAL REFLUX DISEASE WITHOUT ESOPHAGITIS: ICD-10-CM

## 2018-04-03 DIAGNOSIS — M51.16 LUMBAR DISC DISEASE WITH RADICULOPATHY: ICD-10-CM

## 2018-04-03 DIAGNOSIS — G89.4 CHRONIC PAIN SYNDROME: Primary | ICD-10-CM

## 2018-04-03 DIAGNOSIS — Z98.1 S/P LUMBAR FUSION: ICD-10-CM

## 2018-04-03 DIAGNOSIS — R11.0 NAUSEA: ICD-10-CM

## 2018-04-03 DIAGNOSIS — J45.30 MILD PERSISTENT ASTHMA WITHOUT COMPLICATION: ICD-10-CM

## 2018-04-03 DIAGNOSIS — S32.2XXK CLOSED FRACTURE OF COCCYX WITH NONUNION, SUBSEQUENT ENCOUNTER: ICD-10-CM

## 2018-04-03 PROCEDURE — 99214 OFFICE O/P EST MOD 30 MIN: CPT | Performed by: PHYSICIAN ASSISTANT

## 2018-04-03 RX ORDER — ALBUTEROL SULFATE 90 UG/1
2 AEROSOL, METERED RESPIRATORY (INHALATION) EVERY 6 HOURS PRN
Qty: 3 INHALER | Refills: 3 | Status: SHIPPED | OUTPATIENT
Start: 2018-04-03 | End: 2018-06-19

## 2018-04-03 RX ORDER — DICYCLOMINE HCL 20 MG
20 TABLET ORAL 4 TIMES DAILY PRN
Qty: 60 TABLET | Refills: 6 | Status: SHIPPED | OUTPATIENT
Start: 2018-04-03 | End: 2018-06-19

## 2018-04-03 RX ORDER — DEXLANSOPRAZOLE 60 MG/1
60 CAPSULE, DELAYED RELEASE ORAL DAILY
Qty: 90 CAPSULE | Refills: 3 | Status: SHIPPED | OUTPATIENT
Start: 2018-04-03 | End: 2018-08-21

## 2018-04-03 RX ORDER — CETIRIZINE HYDROCHLORIDE 10 MG/1
10 TABLET ORAL DAILY
Qty: 90 TABLET | Refills: 3 | Status: SHIPPED | OUTPATIENT
Start: 2018-04-03 | End: 2018-10-08

## 2018-04-03 RX ORDER — ALBUTEROL SULFATE 90 UG/1
2 AEROSOL, METERED RESPIRATORY (INHALATION) EVERY 6 HOURS PRN
Qty: 1 INHALER | Refills: 1 | Status: SHIPPED | OUTPATIENT
Start: 2018-04-03 | End: 2018-04-03

## 2018-04-03 RX ORDER — ALBUTEROL SULFATE 0.83 MG/ML
1 SOLUTION RESPIRATORY (INHALATION) EVERY 6 HOURS PRN
Qty: 50 VIAL | Refills: 11 | Status: SHIPPED | OUTPATIENT
Start: 2018-04-03 | End: 2018-06-19

## 2018-04-03 RX ORDER — OXYCODONE AND ACETAMINOPHEN 5; 325 MG/1; MG/1
1 TABLET ORAL EVERY 6 HOURS PRN
Qty: 25 TABLET | Refills: 0 | Status: SHIPPED | OUTPATIENT
Start: 2018-04-03 | End: 2018-06-13

## 2018-04-03 RX ORDER — EPINEPHRINE 0.3 MG/.3ML
0.3 INJECTION SUBCUTANEOUS
Qty: 0.6 ML | Refills: 1 | Status: SHIPPED | OUTPATIENT
Start: 2018-04-03 | End: 2018-12-13

## 2018-04-03 ASSESSMENT — PAIN SCALES - GENERAL: PAINLEVEL: MODERATE PAIN (5)

## 2018-04-03 NOTE — TELEPHONE ENCOUNTER
These were ordered as below, generic.     Zach Rosario PA-C  [facility]

## 2018-04-03 NOTE — TELEPHONE ENCOUNTER
Received message from pharmacy proventil and proair aren't covered preferred is ventolin and albuterol please change medication

## 2018-04-03 NOTE — MR AVS SNAPSHOT
After Visit Summary   4/3/2018    Katy Islas    MRN: [id number]           Patient Information     Date Of Birth          1990        Visit Information        Provider Department      4/3/2018 3:00 PM Aura Rosario PA-C; [facility]        Today's Diagnoses     Chronic pain syndrome    -  1    S/P lumbar fusion        Lumbar disc disease with radiculopathy        Closed fracture of coccyx with nonunion, subsequent encounter        Gastroesophageal reflux disease without esophagitis        Mild persistent asthma without complication        Bee sting allergy        Hx of Crohn's disease        Nausea          Care Instructions    Preferred Location: [facility] [phone]    - Recheck 2-3 months               Follow-ups after your visit        Your next 10 appointments already scheduled     Apr 09, 2018  9:00 AM CDT   [facility] with Natalie Segovia   [facility]    [address] [place] [address] [address]  [facility] [address] [phone]              Who to contact     If you have questions or need follow up information about today's clinic visit or your schedule please contact [facility] directly at [phone].  Normal or non-critical lab and imaging results will be communicated to you by MyChart, letter or phone within 4 business days after the clinic has received the results. If you do not hear from us within 7 days, please contact the clinic through MyChart or phone. If you have a critical or abnormal lab result, we will notify you by phone as soon as possible.  Submit refill requests through ithinksport or call your pharmacy and they will forward the refill request to us. Please allow 3 business days for your refill to be completed.          Additional Information About Your Visit        MyChart Information     ithinksport gives you secure access to your electronic health record. If you see a primary  care provider, you can also send messages to your care team and make appointments. If you have questions, please call your primary care clinic.  If you do not have a primary care provider, please call [phone] and they will assist you.        Care EveryWhere ID     This is your Care EveryWhere ID. This could be used by other organizations to access your [place] medical records  [id number]        Your Vitals Were     Pulse Temperature Respirations Pulse Oximetry BMI (Body Mass Index)       106 99  F (37.2  C) (Temporal) 18 97% 30.2 kg/m2        Blood Pressure from Last 3 Encounters:   04/03/18 132/84   03/26/18 114/82   03/20/18 114/68    Weight from Last 3 Encounters:   04/03/18 162 lb (73.5 kg)   03/26/18 159 lb (72.1 kg)   03/20/18 150 lb 4 oz (68.2 kg)              We Performed the Following     DEPRESSION ACTION PLAN (DAP)          Today's Medication Changes          These changes are accurate as of 4/3/18  3:29 PM.  If you have any questions, ask your nurse or doctor.               Start taking these medicines.        Dose/Directions    oxyCODONE-acetaminophen 5-325 MG per tablet   Commonly known as:  PERCOCET   Used for:  Chronic pain syndrome, S/P lumbar fusion, Lumbar disc disease with radiculopathy, Closed fracture of coccyx with nonunion, subsequent encounter   Started by:  Aura Rosario PA-C        Dose:  1 tablet   Take 1 tablet by mouth every 6 hours as needed for severe pain maximum 2 tablet(s) per day   Quantity:  25 tablet   Refills:  0         These medicines have changed or have updated prescriptions.        Dose/Directions    * albuterol 108 (90 BASE) MCG/ACT Inhaler   Commonly known as:  PROAIR HFA/PROVENTIL HFA/VENTOLIN HFA   This may have changed:  You were already taking a medication with the same name, and this prescription was added. Make sure you understand how and when to take each.   Used for:  Mild persistent asthma without complication   Changed by:   Aura Rosario PA-C        Dose:  2 puff   Inhale 2 puffs into the lungs every 6 hours as needed for shortness of breath / dyspnea or wheezing   Quantity:  3 Inhaler   Refills:  3       * albuterol (2.5 MG/3ML) 0.083% neb solution   This may have changed:  Another medication with the same name was added. Make sure you understand how and when to take each.   Used for:  Mild persistent asthma without complication   Changed by:  Aura Rosario PA-C        Dose:  1 vial   Take 1 vial (2.5 mg) by nebulization every 6 hours as needed for shortness of breath / dyspnea or wheezing   Quantity:  50 vial   Refills:  11       * Notice:  This list has 2 medication(s) that are the same as other medications prescribed for you. Read the directions carefully, and ask your doctor or other care provider to review them with you.         Where to get your medicines      These medications were sent to [facility] [address] [place] [address] [facility] [address] & [facility]  [address] [facility], [facility] [address]     Phone:  [phone]     albuterol (2.5 MG/3ML) 0.083% neb solution    albuterol 108 (90 BASE) MCG/ACT Inhaler    cetirizine 10 MG tablet    dexlansoprazole 60 MG Cpdr CR capsule    dicyclomine 20 MG tablet    EPINEPHrine 0.3 MG/0.3ML injection 2-pack    promethazine 6.25 MG/5ML syrup         Some of these will need a paper prescription and others can be bought over the counter.  Ask your nurse if you have questions.     Bring a paper prescription for each of these medications     oxyCODONE-acetaminophen 5-325 MG per tablet                Primary Care Provider Office Phone # Fax #    Aura Rosario PA-C [phone]       [address] [place] [address] [address]  [facility] [address]        Equal Access to Services     VIKY VILLANUEVA AH: Ruth redmond Soyvette, waaxda luqadaha, qaybta kaalmada norbertoyaduarte, susan santo ah. So [facility]  [phone].    ATENCIÓN: Si maggi nathan, tiene a kerr disposición servicios gratuitos de asistencia lingüística. Casper craig [phone].    We comply with applicable federal civil rights laws and [place] laws. We do not discriminate on the basis of race, color, national origin, age, disability, sex, sexual orientation, or gender identity.            Thank you!     Thank you for choosing [facility]  for your care. Our goal is always to provide you with excellent care. Hearing back from our patients is one way we can continue to improve our services. Please take a few minutes to complete the written survey that you may receive in the mail after your visit with us. Thank you!             Your Updated Medication List - Protect others around you: Learn how to safely use, store and throw away your medicines at www.disposemymeds.org.          This list is accurate as of 4/3/18  3:29 PM.  Always use your most recent med list.                   Brand Name Dispense Instructions for use Diagnosis    * albuterol 108 (90 BASE) MCG/ACT Inhaler    PROAIR HFA/PROVENTIL HFA/VENTOLIN HFA    3 Inhaler    Inhale 2 puffs into the lungs every 6 hours as needed for shortness of breath / dyspnea or wheezing    Mild persistent asthma without complication       * albuterol (2.5 MG/3ML) 0.083% neb solution     50 vial    Take 1 vial (2.5 mg) by nebulization every 6 hours as needed for shortness of breath / dyspnea or wheezing    Mild persistent asthma without complication       ARIPiprazole 15 MG tablet    ABILIFY    30 tablet    Take 1 tablet (15 mg) by mouth At Bedtime    Adjustment disorder with mixed anxiety and depressed mood, Anxiety       cetirizine 10 MG tablet    zyrTEC    90 tablet    Take 1 tablet (10 mg) by mouth daily    Mild persistent asthma without complication       cloNIDine 0.1 MG tablet    CATAPRES    60 tablet    Take 2 tablets (0.2 mg) by mouth 2 times daily    Chronic pain syndrome, Chronic bilateral  low back pain with left-sided sciatica, Postoperative pain, Anxiety, Mild major depression (H)       cyclobenzaprine 10 MG tablet    FLEXERIL    60 tablet    Take 1 tablet (10 mg) by mouth 2 times daily as needed for muscle spasms or other (back pain)    Chronic bilateral low back pain with left-sided sciatica       dexlansoprazole 60 MG Cpdr CR capsule    DEXILANT    90 capsule    Take 1 capsule (60 mg) by mouth daily    Gastroesophageal reflux disease without esophagitis       diazepam 10 MG tablet    VALIUM    60 tablet    Take 1 tablet (10 mg) by mouth every 12 hours as needed for anxiety or sleep (MUSCLE SPASM) (one month supply)    Chronic pain syndrome, Chronic bilateral low back pain with left-sided sciatica, Lumbar disc disease with radiculopathy, S/P lumbar fusion       dicyclomine 20 MG tablet    BENTYL    60 tablet    Take 1 tablet (20 mg) by mouth 4 times daily as needed (ABDOMINAL CRAMPING) AS NEEDED FOR CRAMPING    Hx of Crohn's disease       DULoxetine 60 MG EC capsule    CYMBALTA    90 capsule    Take 1 capsule (60 mg) by mouth daily    Adjustment disorder with mixed anxiety and depressed mood       EPINEPHrine 0.3 MG/0.3ML injection 2-pack    EPIPEN/ADRENACLICK/or ANY BX GENERIC EQUIV    0.6 mL    Inject 0.3 mLs (0.3 mg) into the muscle once as needed for anaphylaxis    Bee sting allergy       gabapentin 300 MG capsule    NEURONTIN    120 capsule    Take 1-3 capsules (300-900 mg) by mouth 3 times daily    Chronic bilateral low back pain with left-sided sciatica       meclizine 12.5 MG tablet    ANTIVERT    30 tablet    Take 1 tablet (12.5 mg) by mouth 4 times daily as needed for dizziness    Dizziness       medical cannabis inhalation (Patient's own supply.  Not a prescription)      Inhale into the lungs 3 times daily as needed Reported on 3/28/2017        medroxyPROGESTERone 150 MG/ML injection    DEPO-PROVERA    3 mL    Inject 1 mL (150 mg) into the muscle every 3 months    Encounter for  surveillance of injectable contraceptive, Dysmenorrhea       naloxone nasal spray    NARCAN    0.2 mL    Spray 1 spray (4 mg) into one nostril alternating nostrils as needed for opioid reversal every 2-3 minutes until assistance arrives    Chronic pain syndrome, Chronic bilateral low back pain with left-sided sciatica, S/P lumbar fusion, Lumbar disc disease with radiculopathy       NONFORMULARY      Apply 30 mLs topically 4 times daily        oxyCODONE-acetaminophen 5-325 MG per tablet    PERCOCET    25 tablet    Take 1 tablet by mouth every 6 hours as needed for severe pain maximum 2 tablet(s) per day    Chronic pain syndrome, S/P lumbar fusion, Lumbar disc disease with radiculopathy, Closed fracture of coccyx with nonunion, subsequent encounter       polyethylene glycol Packet    MIRALAX/GLYCOLAX     Take 1 packet by mouth daily        promethazine 6.25 MG/5ML syrup    PHENERGAN    560 mL    Take 10 mLs (12.5 mg) by mouth 4 times daily as needed for nausea    Nausea       rizatriptan 5 MG ODT tab    MAXALT-MLT    18 tablet    Take 1-2 tablets (5-10 mg) by mouth at onset of headache for migraine May repeat in 2 hours. Max 6 tablets/24 hours.    Migraine with aura and without status migrainosus, not intractable       * Notice:  This list has 2 medication(s) that are the same as other medications prescribed for you. Read the directions carefully, and ask your doctor or other care provider to review them with you.

## 2018-04-03 NOTE — LETTER
My Depression Action Plan  Name: Katy Islas   Date of Birth 1990  Date: 3/30/2018    My doctor: Aura Rosario   My clinic: [address]  [facility] [address]  [phone]          GREEN    ZONE   Good Control    What it looks like:     Things are going generally well. You have normal up s and down s. You may even feel depressed from time to time, but bad moods usually last less than a day.   What you need to do:  1. Continue to care for yourself (see self care plan)  2. Check your depression survival kit and update it as needed  3. Follow your physician s recommendations including any medication.  4. Do not stop taking medication unless you consult with your physician first.           YELLOW         ZONE Getting Worse    What it looks like:     Depression is starting to interfere with your life.     It may be hard to get out of bed; you may be starting to isolate yourself from others.    Symptoms of depression are starting to last most all day and this has happened for several days.     You may have suicidal thoughts but they are not constant.   What you need to do:     1. Call your care team, your response to treatment will improve if you keep your care team informed of your progress. Yellow periods are signs an adjustment may need to be made.     2. Continue your self-care, even if you have to fake it!    3. Talk to someone in your support network    4. Open up your depression survival kit           RED    ZONE Medical Alert - Get Help    What it looks like:     Depression is seriously interfering with your life.     You may experience these or other symptoms: You can t get out of bed most days, can t work or engage in other necessary activities, you have trouble taking care of basic hygiene, or basic responsibilities, thoughts of suicide or death that will not go away, self-injurious behavior.     What you need to do:  1. Call your  care team and request a same-day appointment. If they are not available (weekends or after hours) call your local crisis line, emergency room or 911.            Depression Self Care Plan / Survival Kit    Self-Care for Depression  Here s the deal. Your body and mind are really not as separate as most people think.  What you do and think affects how you feel and how you feel influences what you do and think. This means if you do things that people who feel good do, it will help you feel better.  Sometimes this is all it takes.  There is also a place for medication and therapy depending on how severe your depression is, so be sure to consult with your medical provider and/ or Behavioral Health Consultant if your symptoms are worsening or not improving.     In order to better manage my stress, I will:    Exercise  Get some form of exercise, every day. This will help reduce pain and release endorphins, the  feel good  chemicals in your brain. This is almost as good as taking antidepressants!  This is not the same as joining a gym and then never going! (they count on that by the way ) It can be as simple as just going for a walk or doing some gardening, anything that will get you moving.      Hygiene   Maintain good hygiene (Get out of bed in the morning, Make your bed, Brush your teeth, Take a shower, and Get dressed like you were going to work, even if you are unemployed).  If your clothes don't fit try to get ones that do.    Diet  I will strive to eat foods that are good for me, drink plenty of water, and avoid excessive sugar, caffeine, alcohol, and other mood-altering substances.  Some foods that are helpful in depression are: complex carbohydrates, B vitamins, flaxseed, fish or fish oil, fresh fruits and vegetables.    Psychotherapy  I agree to participate in Individual Therapy (if recommended).    Medication  If prescribed medications, I agree to take them.  Missing doses can result in serious side effects.  I  understand that drinking alcohol, or other illicit drug use, may cause potential side effects.  I will not stop my medication abruptly without first discussing it with my provider.    Staying Connected With Others  I will stay in touch with my friends, family members, and my primary care provider/team.    Use your imagination  Be creative.  We all have a creative side; it doesn t matter if it s oil painting, sand castles, or mud pies! This will also kick up the endorphins.    Witness Beauty  (AKA stop and smell the roses) Take a look outside, even in mid-winter. Notice colors, textures. Watch the squirrels and birds.     Service to others  Be of service to others.  There is always someone else in need.  By helping others we can  get out of ourselves  and remember the really important things.  This also provides opportunities for practicing all the other parts of the program.    Humor  Laugh and be silly!  Adjust your TV habits for less news and crime-drama and more comedy.    Control your stress  Try breathing deep, massage therapy, biofeedback, and meditation. Find time to relax each day.     My support system    Clinic Contact:  Phone number:    Contact 1:  Phone number:    Contact 2:  Phone number:    [place]/:  Phone number:    Therapist:  Phone number:    Local crisis center:    Phone number:    Other community support:  Phone number:

## 2018-04-11 ENCOUNTER — TELEPHONE (OUTPATIENT)
Dept: FAMILY MEDICINE | Facility: OTHER | Age: 28
End: 2018-04-11

## 2018-04-11 NOTE — TELEPHONE ENCOUNTER
You placed a referral to GI  on 3/2/18 and again on 4/2/18.    It is unclear if the patient has scheduled yet, not finding a report showing they were seen.     Please forward to your team if further follow up is needed to see if they have made this appointment.      Thank you!   Natacha/Referral Representative for [facility]

## 2018-04-12 ENCOUNTER — MYC MEDICAL ADVICE (OUTPATIENT)
Dept: FAMILY MEDICINE | Facility: OTHER | Age: 28
End: 2018-04-12

## 2018-04-12 NOTE — TELEPHONE ENCOUNTER
Please call patient to see if she needs any assistance in getting this scheduled.     Zach Rosario PA-C  [facility]

## 2018-04-13 NOTE — TELEPHONE ENCOUNTER
LJ regarding pain.     Ines Roy, RN, BSN     Lesley Rios's SCREENING SCHEDULE   Tests on this list are recommended by your physician but may not be covered, or covered at this frequency, by your insurer. Please check with your insurance carrier before scheduling to verify coverage.    PREVENTATI more often if abnormal Colonoscopy,3 Years due on 10/20/2020 Update Health Maintenance if applicable    Flex Sigmoidoscopy Screen  Covered every 5 years No results found for this or any previous visit. No flowsheet data found.      Fecal Occult Blood   Schaller birthday    Pneumococcal 23 (Pneumovax)  Covered Once after 65 No orders found for this or any previous visit. Please get once after your 65th birthday    Hepatitis B for Moderate/High Risk       No orders found for this or any previous visit.  Medium/high Procedure External Lab or Procedure   Diabetes Screening      HbgA1C    At Least  Annually for Diabetics No results found for: A1C No flowsheet data found.     Fasting Blood Sugar (FSB)   Patient must be diagnosed with one of the following:   • Hypertension flowsheet data found.      Barium Enema-   uncomfortable but covered  Covered but uncomfortable   Glaucoma Screening      Ophthalmology Visit   Covered annually for Diabetics, people with Glaucoma family history,   Americans over age 48   Ellen received Factor VIII or IX concentrates   Clients of institutions for the mentally retarded   Persons who live in the same house as a HepB virus carrier   Homosexual men   Illicit injectable drug abusers     Tetanus Toxoid- Only covered with a cut with met

## 2018-04-24 ENCOUNTER — MYC MEDICAL ADVICE (OUTPATIENT)
Dept: FAMILY MEDICINE | Facility: OTHER | Age: 28
End: 2018-04-24

## 2018-04-25 NOTE — TELEPHONE ENCOUNTER
Metranome message read 4/24/2018 11:04 AM by Katy Islas. No response at this time.  Nohemi Jennings RN, BSN

## 2018-04-26 NOTE — TELEPHONE ENCOUNTER
Pt read message, no response. Closing encounter    Last Read in FreeAgent   4/24/2018 11:04 AM by Katy Jimenez, RN, BSN

## 2018-05-11 ENCOUNTER — TELEPHONE (OUTPATIENT)
Dept: FAMILY MEDICINE | Facility: OTHER | Age: 28
End: 2018-05-11

## 2018-05-11 ENCOUNTER — TRANSFERRED RECORDS (OUTPATIENT)
Dept: HEALTH INFORMATION MANAGEMENT | Facility: CLINIC | Age: 28
End: 2018-05-11

## 2018-05-11 ENCOUNTER — MYC MEDICAL ADVICE (OUTPATIENT)
Dept: FAMILY MEDICINE | Facility: OTHER | Age: 28
End: 2018-05-11

## 2018-05-11 NOTE — TELEPHONE ENCOUNTER
Can't accommodate patient today and can't prescribe narcotics without a visit. Recommend ED/UC for pain until sees her surgeon on Monday.    Zach Rosario PA-C  [facility]

## 2018-05-11 NOTE — TELEPHONE ENCOUNTER
Can't work in. Recommend UC/ED for pain until can see her back surgeon on Monday.    Zach Rosario PA-C  [facility]

## 2018-05-11 NOTE — TELEPHONE ENCOUNTER
Responded via SupportBee using CDL note below in alternative encounter. Will close this encounter. Nohemi Jennings RN, BSN

## 2018-05-12 LAB
CREAT SERPL-MCNC: 0.87 MG/DL (ref 0.55–1.02)
GFR SERPL CREATININE-BSD FRML MDRD: >60 ML/MIN
GLUCOSE SERPL-MCNC: 121 MG/DL (ref 74–106)
POTASSIUM SERPL-SCNC: 4.8 MMOL/L (ref 3.5–5.1)

## 2018-05-14 ENCOUNTER — OFFICE VISIT (OUTPATIENT)
Dept: FAMILY MEDICINE | Facility: OTHER | Age: 28
End: 2018-05-14
Payer: MEDICARE

## 2018-05-14 VITALS
SYSTOLIC BLOOD PRESSURE: 130 MMHG | TEMPERATURE: 98.1 F | RESPIRATION RATE: 16 BRPM | DIASTOLIC BLOOD PRESSURE: 83 MMHG | HEART RATE: 84 BPM | OXYGEN SATURATION: 100 % | BODY MASS INDEX: 30.01 KG/M2 | WEIGHT: 161 LBS

## 2018-05-14 DIAGNOSIS — G89.29 CHRONIC BILATERAL LOW BACK PAIN WITH LEFT-SIDED SCIATICA: ICD-10-CM

## 2018-05-14 DIAGNOSIS — M25.561 CHRONIC PAIN OF RIGHT KNEE: ICD-10-CM

## 2018-05-14 DIAGNOSIS — D22.9 ATYPICAL MOLE: ICD-10-CM

## 2018-05-14 DIAGNOSIS — G89.29 CHRONIC PAIN OF RIGHT KNEE: ICD-10-CM

## 2018-05-14 DIAGNOSIS — M54.42 CHRONIC BILATERAL LOW BACK PAIN WITH LEFT-SIDED SCIATICA: ICD-10-CM
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
## 2018-05-14 DIAGNOSIS — Z72.0 TOBACCO ABUSE: ICD-10-CM

## 2018-05-14 PROCEDURE — 99214 OFFICE O/P EST MOD 30 MIN: CPT | Performed by: PHYSICIAN ASSISTANT

## 2018-05-14 RX ORDER — NICOTINE 21 MG/24HR
1 PATCH, TRANSDERMAL 24 HOURS TRANSDERMAL EVERY 24 HOURS
Qty: 30 PATCH | Refills: 1 | Status: SHIPPED | OUTPATIENT
Start: 2018-05-14 | End: 2018-06-13

## 2018-05-14 NOTE — MR AVS SNAPSHOT
After Visit Summary   5/14/2018    Katy Islas    MRN: [id number]           Patient Information     Date Of Birth          1990        Visit Information        Provider Department      5/14/2018 4:00 PM Aura Rosario PA-C; [facility]        Today's Diagnoses     Fall, subsequent encounter    -  1    Chronic bilateral low back pain with left-sided sciatica        Chronic pain of right knee        Tobacco abuse          Care Instructions    ?10 cigarettes per day or weight < 45 kg - Start with the medium dose nicotine patch (14 mg/day) for six weeks, followed by 7 mg/day for two weeks      - They will call you to schedule with Dr. Maldonado     - Schedule mole removal           Follow-ups after your visit        Additional Services     ORTHO  REFERRAL       [facility] is referring you to the Orthopedic  Services at [facility].       The  Representative will assist you in the coordination of your Orthopedic and Musculoskeletal Care as prescribed by your physician.    The  Representative will call you within 1 business day to help schedule your appointment, or you may contact the  Representative at:    All areas ~ [phone]     Type of Referral : Surgical / Specialist       Timeframe requested: Routine    Coverage of these services is subject to the terms and limitations of your health insurance plan.  Please call member services at your health plan with any benefit or coverage questions.      If X-rays, CT or MRI's have been performed, please contact the facility where they were done to arrange for , prior to your scheduled appointment.  Please bring this referral request to your appointment and present it to your specialist.                  Who to contact     If you have questions or need follow up information about today's clinic visit or your schedule please  contact [facility] directly at [phone].  Normal or non-critical lab and imaging results will be communicated to you by MyChart, letter or phone within 4 business days after the clinic has received the results. If you do not hear from us within 7 days, please contact the clinic through MyChart or phone. If you have a critical or abnormal lab result, we will notify you by phone as soon as possible.  Submit refill requests through [employer] or call your pharmacy and they will forward the refill request to us. Please allow 3 business days for your refill to be completed.          Additional Information About Your Visit        [employer] Information     [employer] gives you secure access to your electronic health record. If you see a primary care provider, you can also send messages to your care team and make appointments. If you have questions, please call your primary care clinic.  If you do not have a primary care provider, please call [phone] and they will assist you.        Care EveryWhere ID     This is your Care EveryWhere ID. This could be used by other organizations to access your [facility] medical records  [id number]        Your Vitals Were     Pulse Temperature Respirations Pulse Oximetry BMI (Body Mass Index)       84 98.1  F (36.7  C) (Oral) 16 100% 30.01 kg/m2        Blood Pressure from Last 3 Encounters:   05/14/18 130/83   04/03/18 132/84   03/26/18 114/82    Weight from Last 3 Encounters:   05/14/18 161 lb (73 kg)   04/03/18 162 lb (73.5 kg)   03/26/18 159 lb (72.1 kg)              We Performed the Following     ORTHO  REFERRAL          Today's Medication Changes          These changes are accurate as of 5/14/18  4:40 PM.  If you have any questions, ask your nurse or doctor.               Start taking these medicines.        Dose/Directions    nicotine 14 MG/24HR 24 hr patch   Commonly known as:  NICODERM CQ   Used for:  Tobacco abuse   Started by:  Abraham  Aura DIAZ PA-C        Dose:  1 patch   Place 1 patch onto the skin every 24 hours   Quantity:  30 patch   Refills:  1            Where to get your medicines      These medications were sent to [facility] [address] [place] [address] [address] [facility] [address]  [address] [facility], [facility] [address]     Phone:  [phone]     nicotine 14 MG/24HR 24 hr patch                Primary Care Provider Office Phone # Fax #    Aura Rosario PA-C [phone]       [address] [facility] [address]  [facility] [address]        Equal Access to Services     PRATIK VILLANUEVA : Hadii aad ku hadasho Soomaali, waaxda luqadaha, qaybta kaalmada adeegyada, waxfernando ellisin brayden santo . So [facility] [phone].    ATENCIÓN: Si habla español, tiene a kerr disposición servicios gratuitos de asistencia lingüística. [facility] [phone].    We comply with applicable federal civil rights laws and [place] laws. We do not discriminate on the basis of race, color, national origin, age, disability, sex, sexual orientation, or gender identity.            Thank you!     Thank you for choosing [facility]  for your care. Our goal is always to provide you with excellent care. Hearing back from our patients is one way we can continue to improve our services. Please take a few minutes to complete the written survey that you may receive in the mail after your visit with us. Thank you!             Your Updated Medication List - Protect others around you: Learn how to safely use, store and throw away your medicines at www.disposemymeds.org.          This list is accurate as of 5/14/18  4:40 PM.  Always use your most recent med list.                   Brand Name Dispense Instructions for use Diagnosis    * albuterol 108 (90 Base) MCG/ACT Inhaler    PROAIR HFA/PROVENTIL HFA/VENTOLIN HFA    3 Inhaler    Inhale 2 puffs into the lungs every 6 hours as needed for shortness of  breath / dyspnea or wheezing    Mild persistent asthma without complication       * albuterol (2.5 MG/3ML) 0.083% neb solution     50 vial    Take 1 vial (2.5 mg) by nebulization every 6 hours as needed for shortness of breath / dyspnea or wheezing    Mild persistent asthma without complication       ARIPiprazole 15 MG tablet    ABILIFY    30 tablet    Take 1 tablet (15 mg) by mouth At Bedtime    Adjustment disorder with mixed anxiety and depressed mood, Anxiety       cetirizine 10 MG tablet    zyrTEC    90 tablet    Take 1 tablet (10 mg) by mouth daily    Mild persistent asthma without complication       cloNIDine 0.1 MG tablet    CATAPRES    60 tablet    Take 2 tablets (0.2 mg) by mouth 2 times daily    Chronic pain syndrome, Chronic bilateral low back pain with left-sided sciatica, Postoperative pain, Anxiety, Mild major depression (H)       cyclobenzaprine 10 MG tablet    FLEXERIL    60 tablet    Take 1 tablet (10 mg) by mouth 2 times daily as needed for muscle spasms or other (back pain)    Chronic bilateral low back pain with left-sided sciatica       dexlansoprazole 60 MG Cpdr CR capsule    DEXILANT    90 capsule    Take 1 capsule (60 mg) by mouth daily    Gastroesophageal reflux disease without esophagitis       diazepam 10 MG tablet    VALIUM    60 tablet    Take 1 tablet (10 mg) by mouth every 12 hours as needed for anxiety or sleep (MUSCLE SPASM) (one month supply)    Chronic pain syndrome, Chronic bilateral low back pain with left-sided sciatica, Lumbar disc disease with radiculopathy, S/P lumbar fusion       dicyclomine 20 MG tablet    BENTYL    60 tablet    Take 1 tablet (20 mg) by mouth 4 times daily as needed (ABDOMINAL CRAMPING) AS NEEDED FOR CRAMPING    Hx of Crohn's disease       DULoxetine 60 MG EC capsule    CYMBALTA    90 capsule    Take 1 capsule (60 mg) by mouth daily    Adjustment disorder with mixed anxiety and depressed mood       EPINEPHrine 0.3 MG/0.3ML injection 2-pack     EPIPEN/ADRENACLICK/or ANY BX GENERIC EQUIV    0.6 mL    Inject 0.3 mLs (0.3 mg) into the muscle once as needed for anaphylaxis    Bee sting allergy       gabapentin 300 MG capsule    NEURONTIN    120 capsule    Take 1-3 capsules (300-900 mg) by mouth 3 times daily    Chronic bilateral low back pain with left-sided sciatica       meclizine 12.5 MG tablet    ANTIVERT    30 tablet    Take 1 tablet (12.5 mg) by mouth 4 times daily as needed for dizziness    Dizziness       medical cannabis inhalation (Patient's own supply.  Not a prescription)      Inhale into the lungs 3 times daily as needed Reported on 3/28/2017        medroxyPROGESTERone 150 MG/ML injection    DEPO-PROVERA    3 mL    Inject 1 mL (150 mg) into the muscle every 3 months    Encounter for surveillance of injectable contraceptive, Dysmenorrhea       naloxone nasal spray    NARCAN    0.2 mL    Spray 1 spray (4 mg) into one nostril alternating nostrils as needed for opioid reversal every 2-3 minutes until assistance arrives    Chronic pain syndrome, Chronic bilateral low back pain with left-sided sciatica, S/P lumbar fusion, Lumbar disc disease with radiculopathy       nicotine 14 MG/24HR 24 hr patch    NICODERM CQ    30 patch    Place 1 patch onto the skin every 24 hours    Tobacco abuse       NONFORMULARY      Apply 30 mLs topically 4 times daily        oxyCODONE-acetaminophen 5-325 MG per tablet    PERCOCET    25 tablet    Take 1 tablet by mouth every 6 hours as needed for severe pain maximum 2 tablet(s) per day    Chronic pain syndrome, S/P lumbar fusion, Lumbar disc disease with radiculopathy, Closed fracture of coccyx with nonunion, subsequent encounter       polyethylene glycol Packet    MIRALAX/GLYCOLAX     Take 1 packet by mouth daily        promethazine 6.25 MG/5ML syrup    PHENERGAN    560 mL    Take 10 mLs (12.5 mg) by mouth 4 times daily as needed for nausea    Nausea       rizatriptan 5 MG ODT tab    MAXALT-MLT    18 tablet    Take 1-2  tablets (5-10 mg) by mouth at onset of headache for migraine May repeat in 2 hours. Max 6 tablets/24 hours.    Migraine with aura and without status migrainosus, not intractable       * Notice:  This list has 2 medication(s) that are the same as other medications prescribed for you. Read the directions carefully, and ask your doctor or other care provider to review them with you.

## 2018-05-14 NOTE — NURSING NOTE
"Chief Complaint   Patient presents with     RECHECK       Initial /83 (BP Location: Right arm, Patient Position: Chair, Cuff Size: Adult Regular)  Pulse 84  Temp 98.1  F (36.7  C) (Oral)  Resp 16  Wt 161 lb (73 kg)  SpO2 100%  BMI 30.01 kg/m2 Estimated body mass index is 30.01 kg/(m^2) as calculated from the following:    Height as of 1/15/18: 5' 1.42\" (1.56 m).    Weight as of this encounter: 161 lb (73 kg).  Medication Reconciliation: complete  "

## 2018-05-14 NOTE — PATIENT INSTRUCTIONS
?10 cigarettes per day or weight < 45 kg - Start with the medium dose nicotine patch (14 mg/day) for six weeks, followed by 7 mg/day for two weeks      - They will call you to schedule with Dr. Malodnado     - Schedule americo horta

## 2018-05-14 NOTE — PROGRESS NOTES
SUBJECTIVE:   Katy Islas is a 28 year old female who presents to clinic today for the following health issues:      HPI      Hospital Follow-up Visit:    Hospital/Nursing Home/IP Rehab Facility: [facility] ED   Date of Admission: 5/11/18  Date of Discharge: 5/12/18  Reason(s) for Admission: loss of feeling in left leg/herniated disc/SI Joint            Problems taking medications regularly:  None       Medication changes since discharge: None       Problems adhering to non-medication therapy:  None    Summary of hospitalization:  CareEverywhere information obtained and reviewed  Diagnostic Tests/Treatments reviewed.  Follow up needed: none  Other Healthcare Providers Involved in Patient s Care:         None  Update since discharge: improved.    Pt states receiving percocet from Dr Millan, she saw him today and he did an injection into SI joint  Pt states he told her she may have also ripped a muscle when she fell awhile back  - Referred her to physical therapy, pool therapy at [facility]  - Take time to heal   - Mood way better   - Little better from injection but numb   - Told will be sore for a few days   - Percocet as needed     Additional Concerns   - Right knee, injection helped for awhile but now hurting again   - Would like referral to return to orthopedics     - Smoking, 1/2 pack per day       Chantix?      Nicotine gum made mouth feel weird      Patches not covered       Post Discharge Medication Reconciliation: discharge medications reconciled, continue medications without change.  Plan of care communicated with patient     Coding guidelines for this visit:  Type of Medical   Decision Making Face-to-Face Visit       within 7 Days of discharge Face-to-Face Visit        within 14 days of discharge   Moderate Complexity 03106 [id number]   High Complexity 29798 71991                  Problem list and histories reviewed & adjusted, as indicated.  Additional history: as documented    Labs reviewed in  EPIC    ROS:  Constitutional, HEENT, cardiovascular, pulmonary, gi and gu systems are negative, except as otherwise noted.    OBJECTIVE:   /83 (BP Location: Right arm, Patient Position: Chair, Cuff Size: Adult Regular)  Pulse 84  Temp 98.1  F (36.7  C) (Oral)  Resp 16  Wt 161 lb (73 kg)  SpO2 100%  BMI 30.01 kg/m2  Body mass index is 30.01 kg/(m^2).  GENERAL APPEARANCE: healthy, alert and no distress  EYES: Eyes grossly normal to inspection, PERRLA, conjunctivae and sclerae without injection or discharge, EOM intact   MS: No musculoskeletal defects are noted and gait is age appropriate without ataxia   SKIN:    Left shoulder - mole with atypical borders, flat, one color, ~5x4 mm   No other suspicious lesions or rashes, hydration status appears adeuqate with normal skin turgor   BACK: Deferred   PSYCH: Alert and oriented x3; speech- coherent , normal rate and volume; able to articulate logical thoughts, able to abstract reason, no tangential thoughts, no hallucinations or delusions, mentation appears normal, Mood is euthymic. Affect is appropriate for this mood state and bright. Thought content is free of suicidal ideation, hallucinations, and delusions. Dress is adequate and upkept. Eye contact is good during conversation.       Diagnostic Test Results:  none     ASSESSMENT/PLAN:       ICD-10-CM    1. Fall, subsequent encounter W19.XXXD    2. Chronic bilateral low back pain with left-sided sciatica M54.42     G89.29    3. Chronic pain of right knee M25.561 ORTHO  REFERRAL    G89.29    4. Tobacco abuse Z72.0 nicotine (NICODERM CQ) 14 MG/24HR 24 hr patch   5. Atypical mole D22.9      1 & 2. Fall and back pain  - Following with Dr. Millan, her surgeon   - No follow up from hospital needed     3. Knee pain  - Would like to see orthopedics, referral placed, last saw them Dec 2017 and had injection     4. Tobacco  - Discussed due to her mental health history, do not recommend chantix   - Recommend  trial of patches   - Discussed 1/2 pack per day, would start 14 mg/day for 6 weeks, then 7 mg/day for 2 weeks   - Discussed use and side effects     5. Mole  - Due to irregular border, recommend removal   - Discussed risks and benefits of procedure including bleeding, infection, and scarring   - Recommend procedure as follows:          1) Left shoulder - 6 mm punch biopsy, with sutures and removal in 10-14 days                  Supplies: 1% lidocaine with epi, 2 alcohol wipes, chloraprep, biopsy blade - 6 mm, lac pack, Sutures: 4-0 Ethilon, sterile 4x4 pack, sterile 2x2, small tegaderm    The patient indicates understanding of these issues and agrees with the plan.    Follow up: as scheduled     Due to language barrier, an  was present during the history-taking and subsequent discussion (and for part of the physical exam) with this patient.      Aura Rosario PA-C  [facility]

## 2018-05-17 ENCOUNTER — TELEPHONE (OUTPATIENT)
Dept: FAMILY MEDICINE | Facility: OTHER | Age: 28
End: 2018-05-17

## 2018-05-17 NOTE — TELEPHONE ENCOUNTER
Per Dianna Leach it was Naproxen.  Contacted [facility] in [place] let them know of providers note.  Maya Cardenas, CMA

## 2018-05-17 NOTE — TELEPHONE ENCOUNTER
Which medication?  If for Naproxen I sent earlier, request denied as I don't want her on this medication long term due to her history of ulcers.     Zach Rosario PA-C  [facility]

## 2018-05-18 ENCOUNTER — TELEPHONE (OUTPATIENT)
Dept: FAMILY MEDICINE | Facility: OTHER | Age: 28
End: 2018-05-18

## 2018-05-18 NOTE — TELEPHONE ENCOUNTER
Please be sure patient aware.  She can buy them over the counter.  She can also try going to [email or web address] and sometimes they will send free 2 week supply of these types of products.

## 2018-05-18 NOTE — TELEPHONE ENCOUNTER
PRIOR AUTHORIZATION DENIED    Medication: nicoderm 14 mcg patch     Denial Date: 5/18/2018    Denial Rational:  Insurance does not cover medications that are available without a prescription.           Appeal Information: n/a

## 2018-05-18 NOTE — TELEPHONE ENCOUNTER
Central Prior Authorization Team   Phone: [phone]    Medication does not require a PA.  Pharmacy was billing patient's part D insurance instead of medicare part B.  They got a paid claim.

## 2018-05-18 NOTE — TELEPHONE ENCOUNTER
Prior Authorization Retail Medication Request    Medication/Dose: albuterol neb solution  ICD code (if different than what is on RX):    Previously Tried and Failed:    Rationale:      Insurance Name:  [phone]  Insurance ID:  [id number]      Pharmacy Information (if different than what is on RX)  Name:    Phone:

## 2018-05-18 NOTE — TELEPHONE ENCOUNTER
Central Prior Authorization Team   Phone: [phone]    PA Initiation    Medication: nicoderm 14 mcg patch   Insurance Company: [employer] - Phone [phone] Fax [phone]  Pharmacy Filling the Rx:    Filling Pharmacy Phone:    Filling Pharmacy Fax:    Start Date: 5/18/2018

## 2018-05-21 ENCOUNTER — OFFICE VISIT (OUTPATIENT)
Dept: ORTHOPEDICS | Facility: OTHER | Age: 28
End: 2018-05-21
Attending: PHYSICIAN ASSISTANT
Payer: MEDICARE

## 2018-05-21 VITALS
WEIGHT: 158 LBS | TEMPERATURE: 98.1 F | SYSTOLIC BLOOD PRESSURE: 108 MMHG | HEIGHT: 61 IN | BODY MASS INDEX: 29.83 KG/M2 | DIASTOLIC BLOOD PRESSURE: 70 MMHG

## 2018-05-21 DIAGNOSIS — M22.41 CHONDROMALACIA OF BOTH PATELLAE: ICD-10-CM

## 2018-05-21 DIAGNOSIS — M22.42 CHONDROMALACIA OF BOTH PATELLAE: ICD-10-CM

## 2018-05-21 DIAGNOSIS — M22.41 CHONDROMALACIA OF RIGHT PATELLOFEMORAL JOINT: Primary | ICD-10-CM

## 2018-05-21 PROCEDURE — 20610 DRAIN/INJ JOINT/BURSA W/O US: CPT | Mod: RT | Performed by: ORTHOPAEDIC SURGERY

## 2018-05-21 PROCEDURE — T1013 SIGN LANG/ORAL INTERPRETER: HCPCS | Mod: U3 | Performed by: ORTHOPAEDIC SURGERY

## 2018-05-21 PROCEDURE — 99213 OFFICE O/P EST LOW 20 MIN: CPT | Mod: 25 | Performed by: ORTHOPAEDIC SURGERY

## 2018-05-21 RX ORDER — TRIAMCINOLONE ACETONIDE 40 MG/ML
40 INJECTION, SUSPENSION INTRA-ARTICULAR; INTRAMUSCULAR ONCE
Qty: 1 ML | Refills: 0
Start: 2018-05-21 | End: 2018-05-21

## 2018-05-21 ASSESSMENT — PAIN SCALES - GENERAL: PAINLEVEL: NO PAIN (0)

## 2018-05-21 NOTE — MR AVS SNAPSHOT
"              After Visit Summary   5/21/2018    Katy Islas    MRN: [id number]           Patient Information     Date Of Birth          1990        Visit Information        Provider Department      5/21/2018 9:00 AM Loida Quinonez; Will Maldonado,  [facility]        Today's Diagnoses     Chondromalacia of right patellofemoral joint    -  1    Chondromalacia of both patellae           Follow-ups after your visit        Additional Services     PHYSICAL THERAPY REFERRAL       Treatment: Evaluation & Treatment  Special Instructions/Modalities: hep  Special Programs:     Please be aware that coverage of these services is subject to the terms and limitations of your health insurance plan.  Call member services at your health plan with any benefit or coverage questions.      **Note to Provider:  If you are referring outside of [place] for the therapy appointment, please list the name of the location in the \"special instructions\" above, print the referral and give to the patient to schedule the appointment.                  Your next 10 appointments already scheduled     Jun 11, 2018  2:30 PM CDT   Office Visit with Aura Tomlinson-LOVE Moss, NL PROC ROOM 1ST FLOOR [facility] ([facility])    [address] [place] [id number] [phone]           Bring a current list of meds and any records pertaining to this visit. For Physicals, please bring immunization records and any forms needing to be filled out. Please arrive 10 minutes early to complete paperwork.              Who to contact     If you have questions or need follow up information about today's clinic visit or your schedule please contact [facility] directly at [phone].  Normal or non-critical lab and imaging results will be communicated to you by MyChart, letter or phone within 4 business days after the clinic has received the results. " "If you do not hear from us within 7 days, please contact the clinic through [employer] or phone. If you have a critical or abnormal lab result, we will notify you by phone as soon as possible.  Submit refill requests through [employer] or call your pharmacy and they will forward the refill request to us. Please allow 3 business days for your refill to be completed.          Additional Information About Your Visit        RAP Index[employer]buzzing and Teads Information     [employer] gives you secure access to your electronic health record. If you see a primary care provider, you can also send messages to your care team and make appointments. If you have questions, please call your primary care clinic.  If you do not have a primary care provider, please call [phone] and they will assist you.        Care EveryWhere ID     This is your Care EveryWhere ID. This could be used by other organizations to access your [place] medical records  [id number]        Your Vitals Were     Temperature Height BMI (Body Mass Index)             98.1  F (36.7  C) (Temporal) 5' 1.4\" (1.56 m) 29.47 kg/m2          Blood Pressure from Last 3 Encounters:   05/21/18 108/70   05/14/18 130/83   04/03/18 132/84    Weight from Last 3 Encounters:   05/21/18 158 lb (71.7 kg)   05/14/18 161 lb (73 kg)   04/03/18 162 lb (73.5 kg)              We Performed the Following     Kenalog 40 MG  []     Large Joint/Bursa injection and/or drainage (Shoulder, Knee)     PHYSICAL THERAPY REFERRAL          Today's Medication Changes          These changes are accurate as of 5/21/18 10:32 AM.  If you have any questions, ask your nurse or doctor.               Start taking these medicines.        Dose/Directions    triamcinolone acetonide 40 MG/ML injection   Commonly known as:  KENALOG   Used for:  Chondromalacia of both patellae   Started by:  Will Maldonado DO        Dose:  40 mg   1 mL (40 mg) by INTRA-ARTICULAR route once for 1 dose   Quantity:  1 mL   Refills:  0          "   Where to get your medicines      Some of these will need a paper prescription and others can be bought over the counter.  Ask your nurse if you have questions.     You don't need a prescription for these medications     triamcinolone acetonide 40 MG/ML injection                Primary Care Provider Office Phone # Fax #    Aura DIAZ LOVE Rosario [phone]       [address] [facility] [address]  [facility] [address]        Equal Access to Services     VIKY VILLANUEVA : Hadii aad ku hadasho Soomaali, waaxda luqadaha, qaybta kaalmada adeegyada, waxay idiin hayaan adeeg vivi santo . So [facility] [phone].    ATENCIÓN: Si ilianala español, tiene a kerr disposición servicios gratuitos de asistencia lingüística. [facility] [phone].    We comply with applicable federal civil rights laws and [place] laws. We do not discriminate on the basis of race, color, national origin, age, disability, sex, sexual orientation, or gender identity.            Thank you!     Thank you for choosing [facility]  for your care. Our goal is always to provide you with excellent care. Hearing back from our patients is one way we can continue to improve our services. Please take a few minutes to complete the written survey that you may receive in the mail after your visit with us. Thank you!             Your Updated Medication List - Protect others around you: Learn how to safely use, store and throw away your medicines at www.disposemymeds.org.          This list is accurate as of 5/21/18 10:32 AM.  Always use your most recent med list.                   Brand Name Dispense Instructions for use Diagnosis    * albuterol 108 (90 Base) MCG/ACT Inhaler    PROAIR HFA/PROVENTIL HFA/VENTOLIN HFA    3 Inhaler    Inhale 2 puffs into the lungs every 6 hours as needed for shortness of breath / dyspnea or wheezing    Mild persistent asthma without complication       * albuterol (2.5 MG/3ML) 0.083% neb solution     50 vial     Take 1 vial (2.5 mg) by nebulization every 6 hours as needed for shortness of breath / dyspnea or wheezing    Mild persistent asthma without complication       ARIPiprazole 15 MG tablet    ABILIFY    30 tablet    Take 1 tablet (15 mg) by mouth At Bedtime    Adjustment disorder with mixed anxiety and depressed mood, Anxiety       cetirizine 10 MG tablet    zyrTEC    90 tablet    Take 1 tablet (10 mg) by mouth daily    Mild persistent asthma without complication       cloNIDine 0.1 MG tablet    CATAPRES    60 tablet    Take 2 tablets (0.2 mg) by mouth 2 times daily    Chronic pain syndrome, Chronic bilateral low back pain with left-sided sciatica, Postoperative pain, Anxiety, Mild major depression (H)       cyclobenzaprine 10 MG tablet    FLEXERIL    60 tablet    Take 1 tablet (10 mg) by mouth 2 times daily as needed for muscle spasms or other (back pain)    Chronic bilateral low back pain with left-sided sciatica       dexlansoprazole 60 MG Cpdr CR capsule    DEXILANT    90 capsule    Take 1 capsule (60 mg) by mouth daily    Gastroesophageal reflux disease without esophagitis       diazepam 10 MG tablet    VALIUM    60 tablet    Take 1 tablet (10 mg) by mouth every 12 hours as needed for anxiety or sleep (MUSCLE SPASM) (one month supply)    Chronic pain syndrome, Chronic bilateral low back pain with left-sided sciatica, Lumbar disc disease with radiculopathy, S/P lumbar fusion       dicyclomine 20 MG tablet    BENTYL    60 tablet    Take 1 tablet (20 mg) by mouth 4 times daily as needed (ABDOMINAL CRAMPING) AS NEEDED FOR CRAMPING    Hx of Crohn's disease       DULoxetine 60 MG EC capsule    CYMBALTA    90 capsule    Take 1 capsule (60 mg) by mouth daily    Adjustment disorder with mixed anxiety and depressed mood       EPINEPHrine 0.3 MG/0.3ML injection 2-pack    EPIPEN/ADRENACLICK/or ANY BX GENERIC EQUIV    0.6 mL    Inject 0.3 mLs (0.3 mg) into the muscle once as needed for anaphylaxis    Bee sting allergy        gabapentin 300 MG capsule    NEURONTIN    120 capsule    Take 1-3 capsules (300-900 mg) by mouth 3 times daily    Chronic bilateral low back pain with left-sided sciatica       meclizine 12.5 MG tablet    ANTIVERT    30 tablet    Take 1 tablet (12.5 mg) by mouth 4 times daily as needed for dizziness    Dizziness       medical cannabis inhalation (Patient's own supply.  Not a prescription)      Inhale into the lungs 3 times daily as needed Reported on 3/28/2017        medroxyPROGESTERone 150 MG/ML injection    DEPO-PROVERA    3 mL    Inject 1 mL (150 mg) into the muscle every 3 months    Encounter for surveillance of injectable contraceptive, Dysmenorrhea       naloxone nasal spray    NARCAN    0.2 mL    Spray 1 spray (4 mg) into one nostril alternating nostrils as needed for opioid reversal every 2-3 minutes until assistance arrives    Chronic pain syndrome, Chronic bilateral low back pain with left-sided sciatica, S/P lumbar fusion, Lumbar disc disease with radiculopathy       naproxen 500 MG tablet    NAPROSYN    60 tablet    Take 1 tablet (500 mg) by mouth 2 times daily (with meals)    Chronic bilateral low back pain with left-sided sciatica       nicotine 14 MG/24HR 24 hr patch    NICODERM CQ    30 patch    Place 1 patch onto the skin every 24 hours    Tobacco abuse       NONFORMULARY      Apply 30 mLs topically 4 times daily        oxyCODONE-acetaminophen 5-325 MG per tablet    PERCOCET    25 tablet    Take 1 tablet by mouth every 6 hours as needed for severe pain maximum 2 tablet(s) per day    Chronic pain syndrome, S/P lumbar fusion, Lumbar disc disease with radiculopathy, Closed fracture of coccyx with nonunion, subsequent encounter       polyethylene glycol Packet    MIRALAX/GLYCOLAX     Take 1 packet by mouth daily        promethazine 6.25 MG/5ML syrup    PHENERGAN    560 mL    Take 10 mLs (12.5 mg) by mouth 4 times daily as needed for nausea    Nausea       rizatriptan 5 MG ODT tab    MAXALT-MLT    18  tablet    Take 1-2 tablets (5-10 mg) by mouth at onset of headache for migraine May repeat in 2 hours. Max 6 tablets/24 hours.    Migraine with aura and without status migrainosus, not intractable       triamcinolone acetonide 40 MG/ML injection    KENALOG    1 mL    1 mL (40 mg) by INTRA-ARTICULAR route once for 1 dose    Chondromalacia of both patellae       * Notice:  This list has 2 medication(s) that are the same as other medications prescribed for you. Read the directions carefully, and ask your doctor or other care provider to review them with you.

## 2018-05-21 NOTE — PROGRESS NOTES
"Office Visit-Follow up    Chief Complaint: Katy Islas is a 28 year old female who is being seen for   Chief Complaint   Patient presents with     RECHECK     right distal IT band syndrome,Right knee patellofemoral chondromalacia     Knee Pain     left knee pain       History of Present Illness:   Today's visit:  Communication through an  sign language.  After her last visit she received a right knee distal IT band injection with that provider 1 month of very little relief.  She has been taking Flexeril.  Pain is worse when she goes from a kneeling to standing position.  No new traumas or injuries.  Right knee much worse than left.  Left is recently started bothering her  December 21, 2017 visit:  Katy Islas is a 27 year old female who is seen in consultation at the request of Amber Moss for evaluation of right knee pain.  Mechanism of Injury: No trauma or inciting event.  Location: right knee lateral, anterior  Duration of Pain: On and off for a few years  Rating of Pain:  moderate.    Pain Quality: aching and sharp  Pain is better with: Rest  Pain is worse with: Walking  Treatment so far consists of: Narcotics, previous injections into her knees years ago.   Associated Features: Swelling  Prior history of related problems:    utilized throughout her visit  She had back surgery October 2016.    REVIEW OF SYSTEMS  General: negative for, night sweats, dizziness, fatigue  Resp: No shortness of breath and no cough  CV: negative for chest pain, syncope or near-syncope  GI: negative for nausea, vomiting and diarrhea  : negative for dysuria and hematuria  Musculoskeletal: as above  Neurologic: negative for syncope   Hematologic: negative for bleeding disorder    Physical Exam:  Vitals: /70 (BP Location: Right arm, Patient Position: Chair, Cuff Size: Adult Regular)  Temp 98.1  F (36.7  C) (Temporal)  Ht 5' 1.4\" (1.56 m)  Wt 158 lb (71.7 kg)  BMI 29.47 " kg/m2  BMI= Body mass index is 29.47 kg/(m^2).  Constitutional: healthy, alert and no acute distress   Psychiatric: mentation appears normal and affect normal/bright  NEURO: no focal deficits  RESP: Normal with easy respirations and no use of accessory muscles to breathe, no audible wheezing or retractions  CV: bilateral lower extremity:   no edema         SKIN: No erythema, rashes, excoriation, or breakdown. No evidence of infection.   JOINT/EXTREMITIES:bilateral knees: Slight valgus alignment.  Patellofemoral crepitus with range of motion.  Tenderness worse on the right than left however both of tenderness along the lateral patellar facet lateral joint line and medial joint line.  There is no gross instability to varus and valgus testing.  Negative Lockman.  We quad tone.  Positive J sign  GAIT: not tested             Diagnostic Modalities:  Previous imaging reviewed.  Independent visualization of the images was performed.      Impression: bilateral patellofemoral chondromalacia with some lateral maltracking    Plan:  All of the above pertinent physical exam and imaging modalities findings was reviewed with Katy through the use of a .  Options discussed.  Slightly different than her last exam.  More consistent with patellofemoral symptoms based on exam as well as her subjective complaints.  Recommend intra-articular steroid injection and physical therapy.  I discussed the importance of therapy and the length needed to notice results.                                        INJECTION PROCEDURE:  The patient was counseled about an  injection, including discussion of risks (including infection), contents of the injection, rationale for performing the injection, and expected benefits of the injection. The skin was prepped with alcohol and betadine and then utilizing sterile technique an injection of the right knee joint from the anterolateral approach in the seated position was performed. The  injection consisted 1ml of Kenalog (40mg per 1 ml) mixed with 3ml of 0.5% Marcaine. The patient tolerated the injection well, and there were no complications. The injection site was covered with a Band-Aid. The injection was performed by NATA Fabian, CNP, DNP      If she gets good results with the right knee certainly could proceed with a left knee injection.    Return to clinic PRN, or sooner as needed for changes.  Re-x-ray on return: No    Tomi Maldonado D.O.

## 2018-05-21 NOTE — LETTER
5/21/2018         RE: Katy Islas  [address]  [place] [address]        Dear Colleague,    Thank you for referring your patient, Katy Islas, to the [facility]. Please see a copy of my visit note below.    Office Visit-Follow up    Chief Complaint: Katy Islas is a 28 year old female who is being seen for   Chief Complaint   Patient presents with     RECHECK     right distal IT band syndrome,Right knee patellofemoral chondromalacia     Knee Pain     left knee pain       History of Present Illness:   Today's visit:  Communication through an  sign language.  After her last visit she received a right knee distal IT band injection with that provider 1 month of very little relief.  She has been taking Flexeril.  Pain is worse when she goes from a kneeling to standing position.  No new traumas or injuries.  Right knee much worse than left.  Left is recently started bothering her  December 21, 2017 visit:  Katy Islas is a 27 year old female who is seen in consultation at the request of Amber Moss for evaluation of right knee pain.  Mechanism of Injury: No trauma or inciting event.  Location: right knee lateral, anterior  Duration of Pain: On and off for a few years  Rating of Pain:  moderate.    Pain Quality: aching and sharp  Pain is better with: Rest  Pain is worse with: Walking  Treatment so far consists of: Narcotics, previous injections into her knees years ago.   Associated Features: Swelling  Prior history of related problems:    utilized throughout her visit  She had back surgery October 2016.    REVIEW OF SYSTEMS  General: negative for, night sweats, dizziness, fatigue  Resp: No shortness of breath and no cough  CV: negative for chest pain, syncope or near-syncope  GI: negative for nausea, vomiting and diarrhea  : negative for dysuria and hematuria  Musculoskeletal: as above  Neurologic: negative for syncope  "  Hematologic: negative for bleeding disorder    Physical Exam:  Vitals: /70 (BP Location: Right arm, Patient Position: Chair, Cuff Size: Adult Regular)  Temp 98.1  F (36.7  C) (Temporal)  Ht 5' 1.4\" (1.56 m)  Wt 158 lb (71.7 kg)  BMI 29.47 kg/m2  BMI= Body mass index is 29.47 kg/(m^2).  Constitutional: healthy, alert and no acute distress   Psychiatric: mentation appears normal and affect normal/bright  NEURO: no focal deficits  RESP: Normal with easy respirations and no use of accessory muscles to breathe, no audible wheezing or retractions  CV: bilateral lower extremity:   no edema         SKIN: No erythema, rashes, excoriation, or breakdown. No evidence of infection.   JOINT/EXTREMITIES:bilateral knees: Slight valgus alignment.  Patellofemoral crepitus with range of motion.  Tenderness worse on the right than left however both of tenderness along the lateral patellar facet lateral joint line and medial joint line.  There is no gross instability to varus and valgus testing.  Negative Lockman.  We quad tone.  Positive J sign  GAIT: not tested             Diagnostic Modalities:  Previous imaging reviewed.  Independent visualization of the images was performed.      Impression: bilateral patellofemoral chondromalacia with some lateral maltracking    Plan:  All of the above pertinent physical exam and imaging modalities findings was reviewed with Katy through the use of a .  Options discussed.  Slightly different than her last exam.  More consistent with patellofemoral symptoms based on exam as well as her subjective complaints.  Recommend intra-articular steroid injection and physical therapy.  I discussed the importance of therapy and the length needed to notice results.                                        INJECTION PROCEDURE:  The patient was counseled about an  injection, including discussion of risks (including infection), contents of the injection, rationale for performing " the injection, and expected benefits of the injection. The skin was prepped with alcohol and betadine and then utilizing sterile technique an injection of the right knee joint from the anterolateral approach in the seated position was performed. The injection consisted 1ml of Kenalog (40mg per 1 ml) mixed with 3ml of 0.5% Marcaine. The patient tolerated the injection well, and there were no complications. The injection site was covered with a Band-Aid. The injection was performed by Lew Coates, APRN, CNP, DNP      If she gets good results with the right knee certainly could proceed with a left knee injection.    Return to clinic PRN, or sooner as needed for changes.  Re-x-ray on return: No    Tomi Maldonado D.O.          Again, thank you for allowing me to participate in the care of your patient.        Sincerely,        Will Maldonado, DO

## 2018-06-04 ENCOUNTER — TRANSFERRED RECORDS (OUTPATIENT)
Dept: HEALTH INFORMATION MANAGEMENT | Facility: CLINIC | Age: 28
End: 2018-06-04

## 2018-06-04 ENCOUNTER — TELEPHONE (OUTPATIENT)
Dept: FAMILY MEDICINE | Facility: OTHER | Age: 28
End: 2018-06-04

## 2018-06-04 NOTE — TELEPHONE ENCOUNTER
Reason for Call:  Same Day Appointment, Requested Provider:  anyone     PCP: Aura Rosario    Reason for visit: cold/cough/doesn't feel well     Duration of symptoms: two days    Pt would need  set up, there was an appt with Dr. Antoine in [place] but she has never seen him and only 30 minutes avail, these appts have to be an hour.     Have you been treated for this in the past? Yes    Additional comments: none    Can we leave a detailed message on this number? YES    Phone number patient can be reached at: Other phone number:    Call asap so we can set up  if we are going to     Best Time: [phone] any     Call taken on 6/4/2018 at 12:56 PM by Lesia Marquez

## 2018-06-04 NOTE — TELEPHONE ENCOUNTER
RN called the number left and this number was unavailable due to no name on voicemail did not LM. Nohemi Jennings RN, BSN     Katy Islas is a 28 year old female who calls with cough/cold not feeling well.    NURSING ASSESSMENT:  Description:  Coughing and feels awful. Cough is productive with green mucous. Cough started last week. Has tried cough medication without improvement, inhaler, and nebulizer. Feels warm but has not taken her temperature. Feeling hot and cold. Shortness of breath, struggling to breath in. Disposition made when stated stated she is struggling to breath in.   Onset/duration:  2 days   Precip. factors:  unknown  Associated symptoms: productive cough, chills, sweats, shortness of breath described as struggling to breath in  Improves/worsens symptoms:  Same   Pain scale (0-10)   N/A   LMP/preg/breast feeding:  No LMP recorded. Patient has had an injection.  Last exam/Treatment:  05/14/2018  Allergies:   Allergies   Allergen Reactions     Ativan [Lorazepam] Hives     At the IV site     Baclofen      hives     Bees Hives, Swelling and Difficulty breathing     Caffeine      Contrast Dye      Hives,   Updated 5/10/2016 CT Contrast.     Iodine Hives     Methocarbamol Swelling     Metoclopramide      Other reaction(s): Tremors  LW Reaction: shaking/sweating     Midazolam      Other reaction(s): Agitation     Monosodium Glutamate      Morphine Other (See Comments)     Difficulty with urination     Nsaids Other (See Comments)     GI BLEED x2     Other (Do Not Use) Other (See Comments)     Xanaflex- pt becomes disoriented and loses bladder control     Reglan [Metoclopramide Hcl] Other (See Comments)     shaking     Soma [Carisoprodol] Visual Disturbance     Sleep walking     Tizanidine      Topamax Other (See Comments)     Topamax [Topiramate] Nausea     Tingling  GI/Vomit     Tramadol      Severe Headache, Seizure Risk     Tylenol W/Codeine [Acetaminophen-Codeine] Nausea and Itching     Tylenol 3      Valium Other (See Comments)     Becomes aggressive and angry     Versed Other (See Comments)     Zolmitriptan      Makes face feel like its twitching     Droperidol Anxiety     Flu Virus Vaccine Rash      Arm swelling     NURSING PLAN: Nursing advice to patient to go to ED if struggling to breath, discussed is more short of breath but able to breath normally, could go to UC    RECOMMENDED DISPOSITION:  To ED/UC for evaluation, another person to drive - for cough and struggling to breath   Will comply with recommendation: Yes  If further questions/concerns or if symptoms do not improve, worsen or new symptoms develop, call your PCP or [facility] as soon as possible.    NOTES:  Disposition was determined by the first positive assessment question, therefore all previous assessment questions were negative    Guideline used:  Telephone Triage Protocols for Nurses, Fifth Edition, Telma Odell  Cough  Nursing Judgment    Nohemi Jennings RN, BSN

## 2018-06-09 ENCOUNTER — MYC MEDICAL ADVICE (OUTPATIENT)
Dept: FAMILY MEDICINE | Facility: OTHER | Age: 28
End: 2018-06-09

## 2018-06-11 ENCOUNTER — MYC MEDICAL ADVICE (OUTPATIENT)
Dept: FAMILY MEDICINE | Facility: OTHER | Age: 28
End: 2018-06-11

## 2018-06-11 ENCOUNTER — MYC REFILL (OUTPATIENT)
Dept: FAMILY MEDICINE | Facility: OTHER | Age: 28
End: 2018-06-11

## 2018-06-11 DIAGNOSIS — Z98.1 S/P LUMBAR FUSION: ICD-10-CM

## 2018-06-11 DIAGNOSIS — M51.16 LUMBAR DISC DISEASE WITH RADICULOPATHY: ICD-10-CM

## 2018-06-11 DIAGNOSIS — G89.4 CHRONIC PAIN SYNDROME: ICD-10-CM

## 2018-06-11 DIAGNOSIS — M54.42 CHRONIC BILATERAL LOW BACK PAIN WITH LEFT-SIDED SCIATICA: ICD-10-CM

## 2018-06-11 DIAGNOSIS — G89.29 CHRONIC BILATERAL LOW BACK PAIN WITH LEFT-SIDED SCIATICA: ICD-10-CM

## 2018-06-11 RX ORDER — CYCLOBENZAPRINE HCL 10 MG
10 TABLET ORAL 2 TIMES DAILY PRN
Qty: 60 TABLET | Refills: 3 | Status: CANCELLED | OUTPATIENT
Start: 2018-06-11

## 2018-06-11 NOTE — TELEPHONE ENCOUNTER
Should see Dr. Maldonado about her knee since already established with him and was seen on 5/21/18.     Need her to bring copy of police report. Can't refill medication without appointment. Has appointment today for mole removal. Can discuss today.     Zach Tomlinson-LOVE Moss  [facility]

## 2018-06-11 NOTE — TELEPHONE ENCOUNTER
Message from [employer] I/Ot:  Original authorizing provider: LOVE Bishop would like a refill of the following medications:  cyclobenzaprine (FLEXERIL) 10 MG tablet [Aura Rosario PA-C]  diazepam (VALIUM) 10 MG tablet [Aura Rosario PA-C]  gabapentin (NEURONTIN) 300 MG capsule [Aura Rosario PA-C]    Preferred pharmacy: [facility] [address] [place] [address] [address] [facility] AT [facility] [address]    Comment:

## 2018-06-11 NOTE — TELEPHONE ENCOUNTER
No-showed both appointments today. Likely needs an appointment before any refills but will route to [facility].

## 2018-06-12 ENCOUNTER — TRANSFERRED RECORDS (OUTPATIENT)
Dept: HEALTH INFORMATION MANAGEMENT | Facility: CLINIC | Age: 28
End: 2018-06-12

## 2018-06-12 ENCOUNTER — MYC MEDICAL ADVICE (OUTPATIENT)
Dept: FAMILY MEDICINE | Facility: OTHER | Age: 28
End: 2018-06-12

## 2018-06-12 RX ORDER — DIAZEPAM 10 MG
10 TABLET ORAL EVERY 12 HOURS PRN
Qty: 60 TABLET | Refills: 5 | OUTPATIENT
Start: 2018-06-12

## 2018-06-12 RX ORDER — CYCLOBENZAPRINE HCL 10 MG
10 TABLET ORAL 2 TIMES DAILY PRN
Qty: 60 TABLET | Refills: 3 | Status: SHIPPED | OUTPATIENT
Start: 2018-06-12 | End: 2018-09-26

## 2018-06-12 RX ORDER — GABAPENTIN 300 MG/1
300-900 CAPSULE ORAL 3 TIMES DAILY
Qty: 120 CAPSULE | Refills: 3 | OUTPATIENT
Start: 2018-06-12

## 2018-06-12 NOTE — TELEPHONE ENCOUNTER
See previous message. Can't refill controlled substances without an appointment, which is the only reason I refilled the Flexeril (the only one not a controlled substance).     Zach Rosario PA-C  [facility]

## 2018-06-12 NOTE — TELEPHONE ENCOUNTER
"See \"other\" encounter for today- requesting work in for Thursday.  Oneyda Tapia CMA ([facility])    "

## 2018-06-12 NOTE — TELEPHONE ENCOUNTER
"From previous note, \"My gabapentin, flexeril, Valium is missing.\"     I can only refill the Flexeril. The other 2 are controlled substances and can't fill without an appointment. I also can't do it until I have a police report.     Zach Rosario PA-C  [facility]     "

## 2018-06-12 NOTE — TELEPHONE ENCOUNTER
diazepam (VALIUM) 10 MG tablet      Last Written Prescription Date:  3/26/18  Last Fill Quantity: 60,   # refills: 5  Last Office Visit: 5/14/18  Future Office visit:       Routing refill request to provider for review/approval because:  Drug not on the [facility], [facility] or [facility] refill protocol or controlled substance      gabapentin (NEURONTIN) 300 MG capsule      Last Written Prescription Date:  3/26/18  Last Fill Quantity: 120,   # refills: 3  Last Office Visit: 5/14/18  Future Office visit:       Routing refill request to provider for review/approval because:  Drug not on the [facility], [facility] or [facility] refill protocol or controlled substance    Atiya Garcia RN

## 2018-06-12 NOTE — TELEPHONE ENCOUNTER
Can't accomodates. Will keep her in mind if cancellations. Schedule on Monday.     Zach Rosario PA-C  [facility]

## 2018-06-12 NOTE — TELEPHONE ENCOUNTER
I advised Katy of this.  Reason for Call:  Same Day Appointment, Requested Provider:  ZUHAIR    PCP: Aura Rosario    Reason for visit: controlled substances refill - Thursday appt if you can work her in     Duration of symptoms: NA    Have you been treated for this in the past? Yes    Additional comments: none    Can we leave a detailed message on this number? YES    Phone number patient can be reached at: Home number on file [phone] (home)    Best Time: any    Call taken on 6/12/2018 at 3:08 PM by Leisa Marquez

## 2018-06-12 NOTE — TELEPHONE ENCOUNTER
RX denied. Can't refill controlled substances without appointment.    Zach Rosario PA-C  [facility]

## 2018-06-13 ENCOUNTER — RADIANT APPOINTMENT (OUTPATIENT)
Dept: GENERAL RADIOLOGY | Facility: OTHER | Age: 28
End: 2018-06-13
Attending: ORTHOPAEDIC SURGERY
Payer: MEDICARE

## 2018-06-13 ENCOUNTER — MYC MEDICAL ADVICE (OUTPATIENT)
Dept: SURGERY | Facility: OTHER | Age: 28
End: 2018-06-13

## 2018-06-13 ENCOUNTER — OFFICE VISIT (OUTPATIENT)
Dept: ORTHOPEDICS | Facility: OTHER | Age: 28
End: 2018-06-13
Payer: MEDICARE

## 2018-06-13 VITALS
SYSTOLIC BLOOD PRESSURE: 100 MMHG | DIASTOLIC BLOOD PRESSURE: 70 MMHG | TEMPERATURE: 98.6 F | WEIGHT: 159 LBS | BODY MASS INDEX: 30.02 KG/M2 | HEIGHT: 61 IN

## 2018-06-13 DIAGNOSIS — M25.562 LEFT KNEE PAIN: ICD-10-CM

## 2018-06-13 DIAGNOSIS — G89.29 CHRONIC BILATERAL LOW BACK PAIN WITH LEFT-SIDED SCIATICA: ICD-10-CM

## 2018-06-13 DIAGNOSIS — M54.42 CHRONIC BILATERAL LOW BACK PAIN WITH LEFT-SIDED SCIATICA: ICD-10-CM

## 2018-06-13 DIAGNOSIS — M51.16 LUMBAR DISC DISEASE WITH RADICULOPATHY: ICD-10-CM

## 2018-06-13 DIAGNOSIS — Z98.1 S/P LUMBAR FUSION: ICD-10-CM

## 2018-06-13 DIAGNOSIS — G89.4 CHRONIC PAIN SYNDROME: ICD-10-CM

## 2018-06-13 DIAGNOSIS — M25.562 ACUTE PAIN OF LEFT KNEE: Primary | ICD-10-CM

## 2018-06-13 PROCEDURE — 73564 X-RAY EXAM KNEE 4 OR MORE: CPT | Mod: LT

## 2018-06-13 PROCEDURE — 99213 OFFICE O/P EST LOW 20 MIN: CPT | Performed by: ORTHOPAEDIC SURGERY

## 2018-06-13 RX ORDER — DIAZEPAM 10 MG
10 TABLET ORAL EVERY 12 HOURS PRN
Qty: 60 TABLET | Refills: 5 | OUTPATIENT
Start: 2018-06-13

## 2018-06-13 RX ORDER — GABAPENTIN 300 MG/1
300-900 CAPSULE ORAL 3 TIMES DAILY
Qty: 120 CAPSULE | Refills: 3 | OUTPATIENT
Start: 2018-06-13

## 2018-06-13 ASSESSMENT — PAIN SCALES - GENERAL: PAINLEVEL: MODERATE PAIN (5)

## 2018-06-13 NOTE — MR AVS SNAPSHOT
After Visit Summary   6/13/2018    Katy Islas    MRN: [id number]           Patient Information     Date Of Birth          1990        Visit Information        Provider Department      6/13/2018 1:50 PM Will Maldonado,  [facility]        Today's Diagnoses     Acute pain of left knee    -  1       Follow-ups after your visit        Your next 10 appointments already scheduled     Jun 20, 2018 12:30 PM CDT   MR KNEE LEFT W/O CONTRAST with PHMR1   [facility] ([facility])    [address] [address]   [phone]           Take your medicines as usual, unless your doctor tells you not to. Bring a list of your current medicines to your exam (including vitamins, minerals and over-the-counter drugs). Also bring the results of similar scans you may have had.  Please remove any body piercings and hair extensions before you arrive.  Follow your doctor s orders. If you do not, we may have to postpone your exam.  You may or may not receive IV contrast for this exam pending the discretion of the Radiologist.  You do not need to do anything special to prepare.  The MRI machine uses a strong magnet. Please wear clothes without metal (snaps, zippers). A sweatsuit works well, or we may give you a hospital gown.   **IMPORTANT** THE INSTRUCTIONS BELOW ARE ONLY FOR THOSE PATIENTS WHO HAVE BEEN PRESCRIBED SEDATION OR GENERAL ANESTHESIA DURING THEIR MRI PROCEDURE:  IF YOUR DOCTOR PRESCRIBED ORAL SEDATION (take medicine to help you relax during your exam):   You must get the medicine from your doctor (oral medication) before you arrive. Bring the medicine to the exam. Do not take it at home. You ll be told when to take it upon arriving for your exam.   Arrive one hour early. Bring someone who can take you home after the test. Your medicine will make you sleepy. After the exam, you may not drive, take a bus or take a taxi by yourself.  IF  YOUR DOCTOR PRESCRIBED IV SEDATION:   Arrive one hour early. Bring someone who can take you home after the test. Your medicine will make you sleepy. After the exam, you may not drive, take a bus or take a taxi by yourself.   No eating 6 hours before your exam. You may have clear liquids up until 4 hours before your exam. (Clear liquids include water, clear tea, black coffee and fruit juice without pulp.)  IF YOUR DOCTOR PRESCRIBED ANESTHESIA (be asleep for your exam):   Arrive 1 1/2 hours early. Bring someone who can take you home after the test. You may not drive, take a bus or take a taxi by yourself.   No eating 8 hours before your exam. You may have clear liquids up until 4 hours before your exam. (Clear liquids include water, clear tea, black coffee and fruit juice without pulp.)   You will spend four to five hours in the recovery room.  Please call the Imaging Department at your exam site with any questions.              Future tests that were ordered for you today     Open Future Orders        Priority Expected Expires Ordered    MR Knee Left w/o Contrast Routine  6/13/2019 6/13/2018            Who to contact     If you have questions or need follow up information about today's clinic visit or your schedule please contact [facility] directly at [phone].  Normal or non-critical lab and imaging results will be communicated to you by [email or web address], letter or phone within 4 business days after the clinic has received the results. If you do not hear from us within 7 days, please contact the clinic through PROFICIOt or phone. If you have a critical or abnormal lab result, we will notify you by phone as soon as possible.  Submit refill requests through [employer] or call your pharmacy and they will forward the refill request to us. Please allow 3 business days for your refill to be completed.          Additional Information About Your Visit        Activity Rocket Information     [employer] gives you secure access to  "your electronic health record. If you see a primary care provider, you can also send messages to your care team and make appointments. If you have questions, please call your primary care clinic.  If you do not have a primary care provider, please call [phone] and they will assist you.        Care EveryWhere ID     This is your Care EveryWhere ID. This could be used by other organizations to access your [facility] medical records  [id number]        Your Vitals Were     Temperature Height BMI (Body Mass Index)             98.6  F (37  C) (Temporal) 5' 1.4\" (1.56 m) 29.65 kg/m2          Blood Pressure from Last 3 Encounters:   06/13/18 100/70   05/21/18 108/70   05/14/18 130/83    Weight from Last 3 Encounters:   06/13/18 159 lb (72.1 kg)   05/21/18 158 lb (71.7 kg)   05/14/18 161 lb (73 kg)                 Today's Medication Changes          These changes are accurate as of 6/13/18  2:24 PM.  If you have any questions, ask your nurse or doctor.               Stop taking these medicines if you haven't already. Please contact your care team if you have questions.     nicotine 14 MG/24HR 24 hr patch   Commonly known as:  NICODERM CQ   Stopped by:  Will Maldonado,                     Primary Care Provider Office Phone # Fax #    Aura L LOVE Rosario [phone]       [address] [address]        Equal Access to Services     [facility] HospitalPIETER AH: Hadii aad ku hadasho Soomaali, waaxda luqadaha, qaybta kaalmada adeegyada, susan santo . So [facility] [phone].    ATENCIÓN: Si habla español, tiene a kerr disposición servicios gratuitos de asistencia lingüística. Llame al [phone].    We comply with applicable federal civil rights laws and [place] laws. We do not discriminate on the basis of race, color, national origin, age, disability, sex, sexual orientation, or gender identity.            Thank you!     Thank you for choosing " [facility]  for your care. Our goal is always to provide you with excellent care. Hearing back from our patients is one way we can continue to improve our services. Please take a few minutes to complete the written survey that you may receive in the mail after your visit with us. Thank you!             Your Updated Medication List - Protect others around you: Learn how to safely use, store and throw away your medicines at www.disposemymeds.org.          This list is accurate as of 6/13/18  2:24 PM.  Always use your most recent med list.                   Brand Name Dispense Instructions for use Diagnosis    * albuterol 108 (90 Base) MCG/ACT Inhaler    PROAIR HFA/PROVENTIL HFA/VENTOLIN HFA    3 Inhaler    Inhale 2 puffs into the lungs every 6 hours as needed for shortness of breath / dyspnea or wheezing    Mild persistent asthma without complication       * albuterol (2.5 MG/3ML) 0.083% neb solution     50 vial    Take 1 vial (2.5 mg) by nebulization every 6 hours as needed for shortness of breath / dyspnea or wheezing    Mild persistent asthma without complication       ARIPiprazole 15 MG tablet    ABILIFY    30 tablet    Take 1 tablet (15 mg) by mouth At Bedtime    Adjustment disorder with mixed anxiety and depressed mood, Anxiety       cetirizine 10 MG tablet    zyrTEC    90 tablet    Take 1 tablet (10 mg) by mouth daily    Mild persistent asthma without complication       cloNIDine 0.1 MG tablet    CATAPRES    60 tablet    Take 2 tablets (0.2 mg) by mouth 2 times daily    Chronic pain syndrome, Chronic bilateral low back pain with left-sided sciatica, Postoperative pain, Anxiety, Mild major depression (H)       cyclobenzaprine 10 MG tablet    FLEXERIL    60 tablet    Take 1 tablet (10 mg) by mouth 2 times daily as needed for muscle spasms or other (back pain)    Chronic bilateral low back pain with left-sided sciatica       dexlansoprazole 60 MG Cpdr CR capsule    DEXILANT    90 capsule    Take 1  capsule (60 mg) by mouth daily    Gastroesophageal reflux disease without esophagitis       diazepam 10 MG tablet    VALIUM    60 tablet    Take 1 tablet (10 mg) by mouth every 12 hours as needed for anxiety or sleep (MUSCLE SPASM) (one month supply)    Chronic pain syndrome, Chronic bilateral low back pain with left-sided sciatica, Lumbar disc disease with radiculopathy, S/P lumbar fusion       dicyclomine 20 MG tablet    BENTYL    60 tablet    Take 1 tablet (20 mg) by mouth 4 times daily as needed (ABDOMINAL CRAMPING) AS NEEDED FOR CRAMPING    Hx of Crohn's disease       DULoxetine 60 MG EC capsule    CYMBALTA    90 capsule    Take 1 capsule (60 mg) by mouth daily    Adjustment disorder with mixed anxiety and depressed mood       EPINEPHrine 0.3 MG/0.3ML injection 2-pack    EPIPEN/ADRENACLICK/or ANY BX GENERIC EQUIV    0.6 mL    Inject 0.3 mLs (0.3 mg) into the muscle once as needed for anaphylaxis    Bee sting allergy       gabapentin 300 MG capsule    NEURONTIN    120 capsule    Take 1-3 capsules (300-900 mg) by mouth 3 times daily    Chronic bilateral low back pain with left-sided sciatica       meclizine 12.5 MG tablet    ANTIVERT    30 tablet    Take 1 tablet (12.5 mg) by mouth 4 times daily as needed for dizziness    Dizziness       medical cannabis inhalation (Patient's own supply.  Not a prescription)      Inhale into the lungs 3 times daily as needed Reported on 3/28/2017        medroxyPROGESTERone 150 MG/ML injection    DEPO-PROVERA    3 mL    Inject 1 mL (150 mg) into the muscle every 3 months    Encounter for surveillance of injectable contraceptive, Dysmenorrhea       naloxone nasal spray    NARCAN    0.2 mL    Spray 1 spray (4 mg) into one nostril alternating nostrils as needed for opioid reversal every 2-3 minutes until assistance arrives    Chronic pain syndrome, Chronic bilateral low back pain with left-sided sciatica, S/P lumbar fusion, Lumbar disc disease with radiculopathy       naproxen 500 MG  tablet    NAPROSYN    60 tablet    Take 1 tablet (500 mg) by mouth 2 times daily (with meals)    Chronic bilateral low back pain with left-sided sciatica       NONFORMULARY      Apply 30 mLs topically 4 times daily        polyethylene glycol Packet    MIRALAX/GLYCOLAX     Take 1 packet by mouth daily        promethazine 6.25 MG/5ML syrup    PHENERGAN    560 mL    Take 10 mLs (12.5 mg) by mouth 4 times daily as needed for nausea    Nausea       rizatriptan 5 MG ODT tab    MAXALT-MLT    18 tablet    Take 1-2 tablets (5-10 mg) by mouth at onset of headache for migraine May repeat in 2 hours. Max 6 tablets/24 hours.    Migraine with aura and without status migrainosus, not intractable       * Notice:  This list has 2 medication(s) that are the same as other medications prescribed for you. Read the directions carefully, and ask your doctor or other care provider to review them with you.

## 2018-06-13 NOTE — TELEPHONE ENCOUNTER
Reason for Call:  Medication or medication refill:    Do you use a [facility]?  Name of the pharmacy and phone number for the current request:  [facility]    Name of the medication requested:  Gabapentin and valium    Other request:  Patient is in clinic for another appt today and wanted a message sent to her provider that 2 medications were stolen, gabapentin and valium.  She states she spoke with the police and is waiting for the police report.  Please advise.  Patient is seeing Dr Maldonado now in [place],  06-13-18 at 1:50 with .  Thank you    Can we leave a detailed message on this number? YES    Phone number patient can be reached at: Cell number on file:    Telephone Information:   Mobile [phone]       Best Time: any    Call taken on 6/13/2018 at 1:32 PM by Damaris Monroe

## 2018-06-13 NOTE — LETTER
6/13/2018         RE: Katy Islas  [address]  [place] [address]        Dear Colleague,    Thank you for referring your patient, Katy Islas, to the [facility]. Please see a copy of my visit note below.    Office Visit-Follow up    Chief Complaint: Katy Islas is a 28 year old female who is being seen for   Chief Complaint   Patient presents with     Knee Pain     left knee pain- DOI 6/2/2018     Consult       History of Present Illness:   Katy Islas is a 28 year old female who is seen in consultation at the request of self for evaluation of left knee. Presents with , communicates with sign language.   Mechanism of Injury: states was standing and pushing against her friend, felt a pop to her left knee and immediate pain.  Happened about 2 weeks ago (around 2nd of June).  Was not seen initially. Denies knee cap dislocating.  States since then has been favoring the knee, but noticed pain mostly with stairs, squatting, or moving uphill.  States has been icing, resting the knee, and the pain is somewhat better.  Has started PT for the right knee, and therapist mentioned she might have a sprain to her patellar tendon, patient made appointment. No previous history of this.    Location: right knee anterior  Duration of Pain:  Last 2 weeks  Rating of Pain:  Was severe now more moderate.    Pain Quality: throbbing  Pain is better with: activity modification, rest, elevation  Pain is worse with:  stairs, walking up hill  Treatment so far consists of:  Ice, elevation, rest  Associated Features: Swelling         REVIEW OF SYSTEMS  General: negative for, night sweats, dizziness, fatigue  Resp: No shortness of breath and no cough  CV: negative for chest pain, syncope or near-syncope  GI: negative for nausea, vomiting and diarrhea  : negative for dysuria and hematuria  Musculoskeletal: as above  Neurologic: negative for syncope   Hematologic: negative for  "bleeding disorder    Physical Exam:  Vitals: /70 (BP Location: Right arm, Patient Position: Chair, Cuff Size: Adult Regular)  Temp 98.6  F (37  C) (Temporal)  Ht 1.56 m (5' 1.4\")  Wt 72.1 kg (159 lb)  BMI 29.65 kg/m2  BMI= Body mass index is 29.65 kg/(m^2).  Constitutional: healthy, alert and no acute distress   Psychiatric: mentation appears normal and affect normal/bright  NEURO: no focal deficits  RESP: Normal with easy respirations and no use of accessory muscles to breathe, no audible wheezing or retractions  CV: LLE:  no edema         Regular rate and rhythm by palpation  SKIN: No erythema, rashes, excoriation, or breakdown. No evidence of infection.   JOINT/EXTREMITIES: left knee: mild effusion.  No deformity.  Tender along medial anterior joint space.  Mild pain with medial patellar facet. No other tenderness.  Mild pain with lateral stressing with patella, no apprehension. No J sign. Actively initially , patient able to get to 0 degrees extension but with pain.  PROM 0-100.  Pain at maximum extension. Extensor mechanism is intact.  +Joe  No pain or instability with valgus or varus testing.        Diagnostic Modalities:  left knee X-ray: No fracture, dislocation and or lesion. Normal alignment.  Joint space maintained no significant arthritis. No appreciable soft tissue abnormality  Independent visualization of the images was performed.      Impression: left knee acute pain: medial mensicus tear versus anterior knee pain    Plan:  All of the above pertinent physical exam and imaging modalities findings was reviewed with Katy through the intrepreter.     We discussed her options.  Discussed obtaining MRI versus watching.  Opted to have the MRI scheduled in approximately a week and to cancel it if she is improved..  Also discussed avoiding painful activities, rest, ice, elevation.  Patient cannot take NSAIDs.       Return to clinic to discuss test results, or sooner as needed for " changes.  Re-x-ray on return: No    Scribed by:  Lew Coates, APRN, CNP, DNP  2:20 PM  6/13/2018    I attest I have seen and evaluated the patient.  I agree with above impression and plan.  Tomi Maldonado D.O.          Again, thank you for allowing me to participate in the care of your patient.        Sincerely,        Will Maldonado, DO

## 2018-06-13 NOTE — PROGRESS NOTES
"Office Visit-Follow up    Chief Complaint: Katy Islas is a 28 year old female who is being seen for   Chief Complaint   Patient presents with     Knee Pain     left knee pain- DOI 6/2/2018     Consult       History of Present Illness:   Katy Islas is a 28 year old female who is seen in consultation at the request of self for evaluation of left knee. Presents with , communicates with sign language.   Mechanism of Injury: states was standing and pushing against her friend, felt a pop to her left knee and immediate pain.  Happened about 2 weeks ago (around 2nd of June).  Was not seen initially. Denies knee cap dislocating.  States since then has been favoring the knee, but noticed pain mostly with stairs, squatting, or moving uphill.  States has been icing, resting the knee, and the pain is somewhat better.  Has started PT for the right knee, and therapist mentioned she might have a sprain to her patellar tendon, patient made appointment. No previous history of this.    Location: right knee anterior  Duration of Pain:  Last 2 weeks  Rating of Pain:  Was severe now more moderate.    Pain Quality: throbbing  Pain is better with: activity modification, rest, elevation  Pain is worse with:  stairs, walking up hill  Treatment so far consists of:  Ice, elevation, rest  Associated Features: Swelling         REVIEW OF SYSTEMS  General: negative for, night sweats, dizziness, fatigue  Resp: No shortness of breath and no cough  CV: negative for chest pain, syncope or near-syncope  GI: negative for nausea, vomiting and diarrhea  : negative for dysuria and hematuria  Musculoskeletal: as above  Neurologic: negative for syncope   Hematologic: negative for bleeding disorder    Physical Exam:  Vitals: /70 (BP Location: Right arm, Patient Position: Chair, Cuff Size: Adult Regular)  Temp 98.6  F (37  C) (Temporal)  Ht 1.56 m (5' 1.4\")  Wt 72.1 kg (159 lb)  BMI 29.65 kg/m2  BMI= Body mass index is " 29.65 kg/(m^2).  Constitutional: healthy, alert and no acute distress   Psychiatric: mentation appears normal and affect normal/bright  NEURO: no focal deficits  RESP: Normal with easy respirations and no use of accessory muscles to breathe, no audible wheezing or retractions  CV: LLE:  no edema         Regular rate and rhythm by palpation  SKIN: No erythema, rashes, excoriation, or breakdown. No evidence of infection.   JOINT/EXTREMITIES: left knee: mild effusion.  No deformity.  Tender along medial anterior joint space.  Mild pain with medial patellar facet. No other tenderness.  Mild pain with lateral stressing with patella, no apprehension. No J sign. Actively initially , patient able to get to 0 degrees extension but with pain.  PROM 0-100.  Pain at maximum extension. Extensor mechanism is intact.  +Joe  No pain or instability with valgus or varus testing.        Diagnostic Modalities:  left knee X-ray: No fracture, dislocation and or lesion. Normal alignment.  Joint space maintained no significant arthritis. No appreciable soft tissue abnormality  Independent visualization of the images was performed.      Impression: left knee acute pain: medial mensicus tear versus anterior knee pain    Plan:  All of the above pertinent physical exam and imaging modalities findings was reviewed with Katy through the intrepreter.     We discussed her options.  Discussed obtaining MRI versus watching.  Opted to have the MRI scheduled in approximately a week and to cancel it if she is improved..  Also discussed avoiding painful activities, rest, ice, elevation.  Patient cannot take NSAIDs.       Return to clinic to discuss test results, or sooner as needed for changes.  Re-x-ray on return: No    Scribed by:  Lew Coates, APRN, CNP, DNP  2:20 PM  6/13/2018    I attest I have seen and evaluated the patient.  I agree with above impression and plan.  Tomi Maldonado D.O.

## 2018-06-13 NOTE — TELEPHONE ENCOUNTER
Patient presented self to  after Specialty appointment in [place] to see if PCP Zach can work her in this week for her stolen medications. Patient stated that she is completely out of her Gabapentin and Valium. Please call patient if this is possible or not.    Sharon Veronica  Reception/ Scheduling

## 2018-06-13 NOTE — TELEPHONE ENCOUNTER
Attempted to call - no answer. [employer] sent.    Also, printed both encounters and gave to Selena with Dr. Maldonado, as patient has an appointment with him today. If she shows up, MA can come find an FP MA to inform Katy if needed.  Abimbola Castro, CMA

## 2018-06-14 NOTE — TELEPHONE ENCOUNTER
[facility] can work pt in on Monday at 1130, spot is on hold.  Left message for pt to call back to clinic. Maya Cardenas CMA
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
## 2018-06-14 NOTE — TELEPHONE ENCOUNTER
See previous notes. Can't prescribe controlled substances without a visit. Patient no-showed 2 appointments this week. I am full the rest of the week. Can schedule for next week.     Zach Tomlinson-LOVE Moss  [facility]

## 2018-06-18 ENCOUNTER — TELEPHONE (OUTPATIENT)
Dept: FAMILY MEDICINE | Facility: OTHER | Age: 28
End: 2018-06-18

## 2018-06-18 NOTE — TELEPHONE ENCOUNTER
Can't accomodate, full schedule. Will need to come as scheduled tomorrow.     Zach Rosario PA-C  [facility]

## 2018-06-18 NOTE — TELEPHONE ENCOUNTER
Reason for Call:  Same Day Appointment, Requested Provider:  ZAYNAB Daley     PCP: Aura Rosario    Reason for visit: refills for Valium and gabapentin.  Duration of symptoms:     Have you been treated for this in the past? Yes    Additional comments: is wondering if Zach would work her in today instead of tomorrow as she is anxious.    Can we leave a detailed message on this number? YES    Phone number patient can be reached at: Home number on file [phone] (home)    Best Time: any    Call taken on 6/18/2018 at 9:00 AM by Kenzie Maria

## 2018-06-19 ENCOUNTER — MYC MEDICAL ADVICE (OUTPATIENT)
Dept: FAMILY MEDICINE | Facility: OTHER | Age: 28
End: 2018-06-19

## 2018-06-19 ENCOUNTER — OFFICE VISIT (OUTPATIENT)
Dept: FAMILY MEDICINE | Facility: OTHER | Age: 28
End: 2018-06-19
Payer: MEDICARE

## 2018-06-19 VITALS
HEART RATE: 111 BPM | BODY MASS INDEX: 30.36 KG/M2 | TEMPERATURE: 98 F | HEIGHT: 62 IN | SYSTOLIC BLOOD PRESSURE: 120 MMHG | DIASTOLIC BLOOD PRESSURE: 72 MMHG | OXYGEN SATURATION: 97 % | WEIGHT: 165 LBS

## 2018-06-19 DIAGNOSIS — K21.9 GASTROESOPHAGEAL REFLUX DISEASE WITHOUT ESOPHAGITIS: ICD-10-CM

## 2018-06-19 DIAGNOSIS — Z32.00 PREGNANCY EXAMINATION OR TEST, PREGNANCY UNCONFIRMED: Primary | ICD-10-CM

## 2018-06-19 DIAGNOSIS — G89.29 CHRONIC BILATERAL LOW BACK PAIN WITH LEFT-SIDED SCIATICA: ICD-10-CM

## 2018-06-19 DIAGNOSIS — B37.31 CANDIDIASIS OF VAGINA: ICD-10-CM

## 2018-06-19 DIAGNOSIS — J20.9 ACUTE BRONCHITIS WITH COEXISTING CONDITION REQUIRING PROPHYLACTIC TREATMENT: ICD-10-CM

## 2018-06-19 DIAGNOSIS — J45.30 MILD PERSISTENT ASTHMA WITHOUT COMPLICATION: ICD-10-CM

## 2018-06-19 DIAGNOSIS — G43.109 MIGRAINE WITH AURA AND WITHOUT STATUS MIGRAINOSUS, NOT INTRACTABLE: ICD-10-CM

## 2018-06-19 DIAGNOSIS — Z87.19 HX OF CROHN'S DISEASE: ICD-10-CM

## 2018-06-19 DIAGNOSIS — M54.42 CHRONIC BILATERAL LOW BACK PAIN WITH LEFT-SIDED SCIATICA: ICD-10-CM

## 2018-06-19 LAB — BETA HCG QUAL IFA URINE: NEGATIVE

## 2018-06-19 PROCEDURE — 84703 CHORIONIC GONADOTROPIN ASSAY: CPT | Performed by: PHYSICIAN ASSISTANT

## 2018-06-19 PROCEDURE — 99215 OFFICE O/P EST HI 40 MIN: CPT | Performed by: PHYSICIAN ASSISTANT

## 2018-06-19 PROCEDURE — 80307 DRUG TEST PRSMV CHEM ANLYZR: CPT | Mod: 90 | Performed by: PHYSICIAN ASSISTANT

## 2018-06-19 RX ORDER — AZITHROMYCIN 250 MG/1
TABLET, FILM COATED ORAL
Qty: 6 TABLET | Refills: 0 | Status: SHIPPED | OUTPATIENT
Start: 2018-06-19 | End: 2018-07-31

## 2018-06-19 RX ORDER — ALBUTEROL SULFATE 0.83 MG/ML
2.5 SOLUTION RESPIRATORY (INHALATION) EVERY 6 HOURS PRN
Qty: 50 VIAL | Refills: 11 | Status: SHIPPED | OUTPATIENT
Start: 2018-06-19 | End: 2019-10-01

## 2018-06-19 RX ORDER — PANTOPRAZOLE SODIUM 40 MG/1
40 TABLET, DELAYED RELEASE ORAL DAILY
Qty: 30 TABLET | Refills: 3 | Status: SHIPPED | OUTPATIENT
Start: 2018-06-19 | End: 2019-04-24

## 2018-06-19 RX ORDER — RIZATRIPTAN BENZOATE 5 MG/1
5-10 TABLET, ORALLY DISINTEGRATING ORAL
Qty: 18 TABLET | Refills: 3 | Status: SHIPPED | OUTPATIENT
Start: 2018-06-19 | End: 2018-10-08

## 2018-06-19 RX ORDER — SUCRALFATE 1 G/1
1 TABLET ORAL 4 TIMES DAILY PRN
Qty: 60 TABLET | Refills: 1 | Status: SHIPPED | OUTPATIENT
Start: 2018-06-19 | End: 2019-01-21

## 2018-06-19 RX ORDER — CODEINE PHOSPHATE AND GUAIFENESIN 10; 100 MG/5ML; MG/5ML
2 SOLUTION ORAL
Qty: 120 ML | Refills: 0 | Status: SHIPPED | OUTPATIENT
Start: 2018-06-19 | End: 2018-07-31

## 2018-06-19 RX ORDER — FLUCONAZOLE 150 MG/1
150 TABLET ORAL ONCE
Qty: 2 TABLET | Refills: 0 | Status: SHIPPED | OUTPATIENT
Start: 2018-06-19 | End: 2019-02-22

## 2018-06-19 RX ORDER — DICYCLOMINE HCL 20 MG
20 TABLET ORAL 4 TIMES DAILY PRN
Qty: 60 TABLET | Refills: 6 | Status: SHIPPED | OUTPATIENT
Start: 2018-06-19 | End: 2019-02-28

## 2018-06-19 RX ORDER — ALBUTEROL SULFATE 90 UG/1
2 AEROSOL, METERED RESPIRATORY (INHALATION) EVERY 6 HOURS PRN
Qty: 3 INHALER | Refills: 3 | Status: SHIPPED | OUTPATIENT
Start: 2018-06-19 | End: 2019-10-01

## 2018-06-19 ASSESSMENT — ANXIETY QUESTIONNAIRES
7. FEELING AFRAID AS IF SOMETHING AWFUL MIGHT HAPPEN: NEARLY EVERY DAY
5. BEING SO RESTLESS THAT IT IS HARD TO SIT STILL: NEARLY EVERY DAY
GAD7 TOTAL SCORE: 21
2. NOT BEING ABLE TO STOP OR CONTROL WORRYING: NEARLY EVERY DAY
1. FEELING NERVOUS, ANXIOUS, OR ON EDGE: NEARLY EVERY DAY
3. WORRYING TOO MUCH ABOUT DIFFERENT THINGS: NEARLY EVERY DAY
GAD7 TOTAL SCORE: 21
4. TROUBLE RELAXING: NEARLY EVERY DAY
7. FEELING AFRAID AS IF SOMETHING AWFUL MIGHT HAPPEN: NEARLY EVERY DAY
6. BECOMING EASILY ANNOYED OR IRRITABLE: NEARLY EVERY DAY
GAD7 TOTAL SCORE: 21

## 2018-06-19 ASSESSMENT — PATIENT HEALTH QUESTIONNAIRE - PHQ9
SUM OF ALL RESPONSES TO PHQ QUESTIONS 1-9: 6
10. IF YOU CHECKED OFF ANY PROBLEMS, HOW DIFFICULT HAVE THESE PROBLEMS MADE IT FOR YOU TO DO YOUR WORK, TAKE CARE OF THINGS AT HOME, OR GET ALONG WITH OTHER PEOPLE: SOMEWHAT DIFFICULT
SUM OF ALL RESPONSES TO PHQ QUESTIONS 1-9: 6

## 2018-06-19 ASSESSMENT — PAIN SCALES - GENERAL: PAINLEVEL: MODERATE PAIN (4)

## 2018-06-19 NOTE — PATIENT INSTRUCTIONS
- Start Protonix 40 mg once a day   - Sucralfate as needed for heartburn symptoms     - Start antibiotic - Z-pack   - Inhaler or nebulizer three times a day until symptom improve at least 1 week   - Diflucan as needed for yeast infection

## 2018-06-19 NOTE — MR AVS SNAPSHOT
After Visit Summary   6/19/2018    Katy Islas    MRN: [id number]           Patient Information     Date Of Birth          1990        Visit Information        Provider Department      6/19/2018 4:00 PM Aura Rosario PA-C; [facility]        Today's Diagnoses     Chronic bilateral low back pain with left-sided sciatica    -  1    Pregnancy examination or test, pregnancy unconfirmed        Hx of Crohn's disease        Migraine with aura and without status migrainosus, not intractable        Gastroesophageal reflux disease without esophagitis        Acute bronchitis with coexisting condition requiring prophylactic treatment        Candidiasis of vagina        Mild persistent asthma without complication          Care Instructions      - Start Protonix 40 mg once a day   - Sucralfate as needed for heartburn symptoms     - Start antibiotic - Z-pack   - Inhaler or nebulizer three times a day until symptom improve at least 1 week   - Diflucan as needed for yeast infection                 Follow-ups after your visit        Your next 10 appointments already scheduled     Jun 20, 2018 12:15 PM CDT   MR KNEE LEFT W/O CONTRAST with PHMR1   [facility] ([facility])    [address] [place] [id number] [phone]           Take your medicines as usual, unless your doctor tells you not to. Bring a list of your current medicines to your exam (including vitamins, minerals and over-the-counter drugs). Also bring the results of similar scans you may have had.  Please remove any body piercings and hair extensions before you arrive.  Follow your doctor s orders. If you do not, we may have to postpone your exam.  You may or may not receive IV contrast for this exam pending the discretion of the Radiologist.  You do not need to do anything special to prepare.  The MRI machine uses a strong magnet. Please wear clothes without metal  (snaps, zippers). A sweatsuit works well, or we may give you a hospital gown.   **IMPORTANT** THE INSTRUCTIONS BELOW ARE ONLY FOR THOSE PATIENTS WHO HAVE BEEN PRESCRIBED SEDATION OR GENERAL ANESTHESIA DURING THEIR MRI PROCEDURE:  IF YOUR DOCTOR PRESCRIBED ORAL SEDATION (take medicine to help you relax during your exam):   You must get the medicine from your doctor (oral medication) before you arrive. Bring the medicine to the exam. Do not take it at home. You ll be told when to take it upon arriving for your exam.   Arrive one hour early. Bring someone who can take you home after the test. Your medicine will make you sleepy. After the exam, you may not drive, take a bus or take a taxi by yourself.  IF YOUR DOCTOR PRESCRIBED IV SEDATION:   Arrive one hour early. Bring someone who can take you home after the test. Your medicine will make you sleepy. After the exam, you may not drive, take a bus or take a taxi by yourself.   No eating 6 hours before your exam. You may have clear liquids up until 4 hours before your exam. (Clear liquids include water, clear tea, black coffee and fruit juice without pulp.)  IF YOUR DOCTOR PRESCRIBED ANESTHESIA (be asleep for your exam):   Arrive 1 1/2 hours early. Bring someone who can take you home after the test. You may not drive, take a bus or take a taxi by yourself.   No eating 8 hours before your exam. You may have clear liquids up until 4 hours before your exam. (Clear liquids include water, clear tea, black coffee and fruit juice without pulp.)   You will spend four to five hours in the recovery room.  Please call the Imaging Department at your exam site with any questions.              Who to contact     If you have questions or need follow up information about today's clinic visit or your schedule please contact [facility] directly at [phone].  Normal or non-critical lab and imaging results will be communicated to you by MyChart, letter or phone within 4  "business days after the clinic has received the results. If you do not hear from us within 7 days, please contact the clinic through [employer] or phone. If you have a critical or abnormal lab result, we will notify you by phone as soon as possible.  Submit refill requests through [employer] or call your pharmacy and they will forward the refill request to us. Please allow 3 business days for your refill to be completed.          Additional Information About Your Visit        SyncSum Information     [employer] gives you secure access to your electronic health record. If you see a primary care provider, you can also send messages to your care team and make appointments. If you have questions, please call your primary care clinic.  If you do not have a primary care provider, please call [phone] and they will assist you.        Care EveryWhere ID     This is your Care EveryWhere ID. This could be used by other organizations to access your [facility] medical records  [id number]        Your Vitals Were     Pulse Temperature Height Pulse Oximetry BMI (Body Mass Index)       111 98  F (36.7  C) (Temporal) 5' 2\" (1.575 m) 97% 30.18 kg/m2        Blood Pressure from Last 3 Encounters:   06/19/18 120/72   06/13/18 100/70   05/21/18 108/70    Weight from Last 3 Encounters:   06/19/18 165 lb (74.8 kg)   06/13/18 159 lb (72.1 kg)   05/21/18 158 lb (71.7 kg)              We Performed the Following     Beta HCG Qual, Urine - [facility] and [place] ([id number])     Pain Drug Scr UR W Rptd Meds          Today's Medication Changes          These changes are accurate as of 6/19/18  5:00 PM.  If you have any questions, ask your nurse or doctor.               Start taking these medicines.        Dose/Directions    azithromycin 250 MG tablet   Commonly known as:  ZITHROMAX   Used for:  Acute bronchitis with coexisting condition requiring prophylactic treatment   Started by:  Aura Rosario PA-C        Two tablets first day, then " one tablet daily for four days.   Quantity:  6 tablet   Refills:  0       fluconazole 150 MG tablet   Commonly known as:  DIFLUCAN   Used for:  Candidiasis of vagina   Started by:  Aura Rosario PA-C        Dose:  150 mg   Take 1 tablet (150 mg) by mouth once for 1 dose May repeat after 3 days if symptoms persist   Quantity:  2 tablet   Refills:  0       guaiFENesin-codeine 100-10 MG/5ML Soln solution   Commonly known as:  ROBITUSSIN AC   Used for:  Acute bronchitis with coexisting condition requiring prophylactic treatment   Started by:  Aura Rosario PA-C        Dose:  2 tsp.   Take 10 mLs by mouth nightly as needed for cough   Quantity:  120 mL   Refills:  0       lidocaine (viscous) 15 mL, alum & mag hydroxide-simethicone 15 mL GI Cocktail   Used for:  Gastroesophageal reflux disease without esophagitis   Started by:  Aura Rosario PA-C        Dose:  30 mL   Take 30 mLs by mouth once for 1 dose   Quantity:  30 mL   Refills:  0       pantoprazole 40 MG EC tablet   Commonly known as:  PROTONIX   Used for:  Gastroesophageal reflux disease without esophagitis   Started by:  Aura Rosario PA-C        Dose:  40 mg   Take 1 tablet (40 mg) by mouth daily Take 30-60 minutes before a meal.   Quantity:  30 tablet   Refills:  3       sucralfate 1 GM tablet   Commonly known as:  CARAFATE   Used for:  Gastroesophageal reflux disease without esophagitis   Started by:  Aura Rosario PA-C        Dose:  1 g   Take 1 tablet (1 g) by mouth 4 times daily as needed   Quantity:  60 tablet   Refills:  1            Where to get your medicines      [facility] [address] [place] [address] [address]  [address] [facility] [address]     Phone:  [phone]     albuterol (2.5 MG/3ML) 0.083% neb solution    albuterol 108 (90 Base) MCG/ACT Inhaler     azithromycin 250 MG tablet    dicyclomine 20 MG tablet    fluconazole 150 MG tablet    pantoprazole 40 MG EC tablet    rizatriptan 5 MG ODT tab    sucralfate 1 GM tablet         Some of these will need a paper prescription and others can be bought over the counter.  Ask your nurse if you have questions.     Bring a paper prescription for each of these medications     guaiFENesin-codeine 100-10 MG/5ML Soln solution       You don't need a prescription for these medications     lidocaine (viscous) 15 mL, alum & mag hydroxide-simethicone 15 mL GI Cocktail                Primary Care Provider Office Phone # Fax #    Aura DIAZ LOVE Rosario [phone]       [address] [facility] [address]  [facility] [address]        Equal Access to Services     VIKY VILLANUEVA : Hadii aad ku hadasho Soomaali, waaxda luqadaha, qaybta kaalmada adeegyada, waxfernando santo . So [facility] [phone].    ATENCIÓN: Si habla español, tiene a kerr disposición servicios gratuitos de asistencia lingüística. Llame al [phone].    We comply with applicable federal civil rights laws and [place] laws. We do not discriminate on the basis of race, color, national origin, age, disability, sex, sexual orientation, or gender identity.            Thank you!     Thank you for choosing [facility]  for your care. Our goal is always to provide you with excellent care. Hearing back from our patients is one way we can continue to improve our services. Please take a few minutes to complete the written survey that you may receive in the mail after your visit with us. Thank you!             Your Updated Medication List - Protect others around you: Learn how to safely use, store and throw away your medicines at www.disposemymeds.org.          This list is accurate as of 6/19/18  5:00 PM.  Always use your most recent med list.                   Brand Name Dispense Instructions for use Diagnosis    * albuterol 108  (90 Base) MCG/ACT Inhaler    PROAIR HFA/PROVENTIL HFA/VENTOLIN HFA    3 Inhaler    Inhale 2 puffs into the lungs every 6 hours as needed for shortness of breath / dyspnea or wheezing    Mild persistent asthma without complication       * albuterol (2.5 MG/3ML) 0.083% neb solution     50 vial    Take 1 vial (2.5 mg) by nebulization every 6 hours as needed for shortness of breath / dyspnea or wheezing    Mild persistent asthma without complication       ARIPiprazole 15 MG tablet    ABILIFY    30 tablet    Take 1 tablet (15 mg) by mouth At Bedtime    Adjustment disorder with mixed anxiety and depressed mood, Anxiety       azithromycin 250 MG tablet    ZITHROMAX    6 tablet    Two tablets first day, then one tablet daily for four days.    Acute bronchitis with coexisting condition requiring prophylactic treatment       cetirizine 10 MG tablet    zyrTEC    90 tablet    Take 1 tablet (10 mg) by mouth daily    Mild persistent asthma without complication       cloNIDine 0.1 MG tablet    CATAPRES    60 tablet    Take 2 tablets (0.2 mg) by mouth 2 times daily    Chronic pain syndrome, Chronic bilateral low back pain with left-sided sciatica, Postoperative pain, Anxiety, Mild major depression (H)       cyclobenzaprine 10 MG tablet    FLEXERIL    60 tablet    Take 1 tablet (10 mg) by mouth 2 times daily as needed for muscle spasms or other (back pain)    Chronic bilateral low back pain with left-sided sciatica       dexlansoprazole 60 MG Cpdr CR capsule    DEXILANT    90 capsule    Take 1 capsule (60 mg) by mouth daily    Gastroesophageal reflux disease without esophagitis       diazepam 10 MG tablet    VALIUM    60 tablet    Take 1 tablet (10 mg) by mouth every 12 hours as needed for anxiety or sleep (MUSCLE SPASM) (one month supply)    Chronic pain syndrome, Chronic bilateral low back pain with left-sided sciatica, Lumbar disc disease with radiculopathy, S/P lumbar fusion       dicyclomine 20 MG tablet    BENTYL    60 tablet     Take 1 tablet (20 mg) by mouth 4 times daily as needed (ABDOMINAL CRAMPING) AS NEEDED FOR CRAMPING    Hx of Crohn's disease       DULoxetine 60 MG EC capsule    CYMBALTA    90 capsule    Take 1 capsule (60 mg) by mouth daily    Adjustment disorder with mixed anxiety and depressed mood       EPINEPHrine 0.3 MG/0.3ML injection 2-pack    EPIPEN/ADRENACLICK/or ANY BX GENERIC EQUIV    0.6 mL    Inject 0.3 mLs (0.3 mg) into the muscle once as needed for anaphylaxis    Bee sting allergy       fluconazole 150 MG tablet    DIFLUCAN    2 tablet    Take 1 tablet (150 mg) by mouth once for 1 dose May repeat after 3 days if symptoms persist    Candidiasis of vagina       gabapentin 300 MG capsule    NEURONTIN    120 capsule    Take 1-3 capsules (300-900 mg) by mouth 3 times daily    Chronic bilateral low back pain with left-sided sciatica       guaiFENesin-codeine 100-10 MG/5ML Soln solution    ROBITUSSIN AC    120 mL    Take 10 mLs by mouth nightly as needed for cough    Acute bronchitis with coexisting condition requiring prophylactic treatment       lidocaine (viscous) 15 mL, alum & mag hydroxide-simethicone 15 mL GI Cocktail     30 mL    Take 30 mLs by mouth once for 1 dose    Gastroesophageal reflux disease without esophagitis       meclizine 12.5 MG tablet    ANTIVERT    30 tablet    Take 1 tablet (12.5 mg) by mouth 4 times daily as needed for dizziness    Dizziness       medical cannabis inhalation (Patient's own supply.  Not a prescription)      Inhale into the lungs 3 times daily as needed Reported on 3/28/2017        naloxone nasal spray    NARCAN    0.2 mL    Spray 1 spray (4 mg) into one nostril alternating nostrils as needed for opioid reversal every 2-3 minutes until assistance arrives    Chronic pain syndrome, Chronic bilateral low back pain with left-sided sciatica, S/P lumbar fusion, Lumbar disc disease with radiculopathy       naproxen 500 MG tablet    NAPROSYN    60 tablet    Take 1 tablet (500 mg) by mouth  2 times daily (with meals)    Chronic bilateral low back pain with left-sided sciatica       NONFORMULARY      Apply 30 mLs topically 4 times daily        pantoprazole 40 MG EC tablet    PROTONIX    30 tablet    Take 1 tablet (40 mg) by mouth daily Take 30-60 minutes before a meal.    Gastroesophageal reflux disease without esophagitis       polyethylene glycol Packet    MIRALAX/GLYCOLAX     Take 1 packet by mouth daily        promethazine 6.25 MG/5ML syrup    PHENERGAN    560 mL    Take 10 mLs (12.5 mg) by mouth 4 times daily as needed for nausea    Nausea       rizatriptan 5 MG ODT tab    MAXALT-MLT    18 tablet    Take 1-2 tablets (5-10 mg) by mouth at onset of headache for migraine May repeat in 2 hours. Max 6 tablets/24 hours.    Migraine with aura and without status migrainosus, not intractable       sucralfate 1 GM tablet    CARAFATE    60 tablet    Take 1 tablet (1 g) by mouth 4 times daily as needed    Gastroesophageal reflux disease without esophagitis       * Notice:  This list has 2 medication(s) that are the same as other medications prescribed for you. Read the directions carefully, and ask your doctor or other care provider to review them with you.

## 2018-06-19 NOTE — PROGRESS NOTES
lidocaine (viscous) 15 mL, alum & mag hydroxide-simethicone 15 mL GI Cocktail  MEDICATION: Mag  ROUTE: Oral   DOSE: 15L  LOT #: [id number]  :  [employer]  EXPIRATION DATE:  08/2018  NDC#: 08286-919-12     MEDICATION: Lidocaine  ROUTE: Oral   DOSE: 15L  LOT #: [id number]  :  [employer]  EXPIRATION DATE:  12/2019  NDC#: 33308-095-72  Prior to injection verified patient identity using patient's name and date of birth.  Due to medication administration, patient instructed to remain in clinic for 15 minutes  afterwards, and to report any adverse reaction to me immediately.    Mixed and given at 5:10pm to patient. Patient tolerated well. Nohemi Jennings, RN, BSN

## 2018-06-20 ASSESSMENT — ASTHMA QUESTIONNAIRES: ACT_TOTALSCORE: 12

## 2018-06-20 ASSESSMENT — PATIENT HEALTH QUESTIONNAIRE - PHQ9: SUM OF ALL RESPONSES TO PHQ QUESTIONS 1-9: 6

## 2018-06-20 ASSESSMENT — ANXIETY QUESTIONNAIRES: GAD7 TOTAL SCORE: 21

## 2018-06-20 NOTE — TELEPHONE ENCOUNTER
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 Placed in out of office provider bin in case.

## 2018-06-25 LAB — PAIN DRUG SCR UR W RPTD MEDS: NORMAL

## 2018-06-25 NOTE — PROGRESS NOTES
Savana Burns    Your results showed you had valium and Gabapentin in your system, not sure if you had taken them once you got them filled that day since you had reported they were stolen. However, it also showed alprazolam or Xanax, which I have not prescribed in a long time. Because of this finding, I will no longer prescribe Valium to you. We can discuss this at our next appointment.       The results are attached for your review.       Zach Rosario PA-C

## 2018-06-26 ENCOUNTER — TRANSFERRED RECORDS (OUTPATIENT)
Dept: HEALTH INFORMATION MANAGEMENT | Facility: CLINIC | Age: 28
End: 2018-06-26

## 2018-06-26 LAB
ALT SERPL-CCNC: 23 IU/L (ref 12–68)
AST SERPL-CCNC: 25 IU/L (ref 15–37)
CREAT SERPL-MCNC: 0.83 MG/DL (ref 0.55–1.02)
GFR SERPL CREATININE-BSD FRML MDRD: >60 ML/MIN/1.73M2
GLUCOSE SERPL-MCNC: 87 MG/DL (ref 70–110)
POTASSIUM SERPL-SCNC: 3.7 MMOL/L (ref 3.5–5.1)

## 2018-06-28 ENCOUNTER — TRANSFERRED RECORDS (OUTPATIENT)
Dept: HEALTH INFORMATION MANAGEMENT | Facility: CLINIC | Age: 28
End: 2018-06-28

## 2018-07-03 ENCOUNTER — OFFICE VISIT (OUTPATIENT)
Dept: FAMILY MEDICINE | Facility: OTHER | Age: 28
End: 2018-07-03
Payer: MEDICARE

## 2018-07-03 VITALS
OXYGEN SATURATION: 99 % | RESPIRATION RATE: 16 BRPM | TEMPERATURE: 98.3 F | HEART RATE: 78 BPM | WEIGHT: 168 LBS | SYSTOLIC BLOOD PRESSURE: 118 MMHG | BODY MASS INDEX: 30.73 KG/M2 | DIASTOLIC BLOOD PRESSURE: 82 MMHG

## 2018-07-03 DIAGNOSIS — K21.9 GASTROESOPHAGEAL REFLUX DISEASE WITHOUT ESOPHAGITIS: ICD-10-CM

## 2018-07-03 DIAGNOSIS — F43.23 ADJUSTMENT DISORDER WITH MIXED ANXIETY AND DEPRESSED MOOD: Primary | ICD-10-CM

## 2018-07-03 DIAGNOSIS — R11.2 NAUSEA AND VOMITING, INTRACTABILITY OF VOMITING NOT SPECIFIED, UNSPECIFIED VOMITING TYPE: ICD-10-CM

## 2018-07-03 DIAGNOSIS — D22.9 ATYPICAL MOLE: ICD-10-CM

## 2018-07-03 DIAGNOSIS — F32.0 MILD MAJOR DEPRESSION (H): ICD-10-CM

## 2018-07-03 DIAGNOSIS — K50.118 CROHN'S DISEASE OF COLON WITH OTHER COMPLICATION (H): ICD-10-CM

## 2018-07-03 DIAGNOSIS — F31.63 BIPOLAR DISORDER, CURRENT EPISODE MIXED, SEVERE, WITHOUT PSYCHOTIC FEATURES (H): ICD-10-CM

## 2018-07-03 PROCEDURE — 99214 OFFICE O/P EST MOD 30 MIN: CPT | Performed by: PHYSICIAN ASSISTANT

## 2018-07-03 ASSESSMENT — PAIN SCALES - GENERAL: PAINLEVEL: NO PAIN (0)

## 2018-07-03 NOTE — PATIENT INSTRUCTIONS
Aug 8th at 9:25 am with Isabel Fowler at [facility] [place] [facility]  Address: [address] [place] [address] [address], [place] [address]   Phone: [phone]    Charlene will be calling you to set up your appointment.

## 2018-07-03 NOTE — NURSING NOTE
"Chief Complaint   Patient presents with     Hospital F/U     Recheck Medication       Initial /82 (BP Location: Left arm, Patient Position: Chair, Cuff Size: Adult Regular)  Pulse 78  Temp 98.3  F (36.8  C) (Oral)  Resp 16  Wt 168 lb (76.2 kg)  SpO2 99%  BMI 30.73 kg/m2 Estimated body mass index is 30.73 kg/(m^2) as calculated from the following:    Height as of 6/19/18: 5' 2\" (1.575 m).    Weight as of this encounter: 168 lb (76.2 kg).  Medication Reconciliation: complete  "

## 2018-07-03 NOTE — MR AVS SNAPSHOT
After Visit Summary   7/3/2018    Katy Islas    MRN: [id number]           Patient Information     Date Of Birth          1990        Visit Information        Provider Department      7/3/2018 8:00 AM Aura Rosario PA-C; [facility]        Today's Diagnoses     Adjustment disorder with mixed anxiety and depressed mood    -  1    Bipolar disorder, current episode mixed, severe, without psychotic features (H)        Mild major depression (H)        Atypical mole          Care Instructions    Aug 8th at 9:25 am with Isabel Fowler at [place] [place] [facility]  Address: [address] [place] [address]  [address], [place] [address]   Phone: [phone]    Charlene will be calling you to set up your appointment.           Follow-ups after your visit        Additional Services     MENTAL HEALTH REFERRAL  - Adult; Psychiatry and Medication Management; Psychiatry; Other: Not Listed - Enter Referral Details in Scheduling Comments Below; Patient call to schedule       [facility] - [address] [address]  [phone]       All scheduling is subject to the client's specific insurance plan & benefits, provider/location availability, and provider clinical specialities.  Please arrive 15 minutes early for your first appointment and bring your completed paperwork.    Please be aware that coverage of these services is subject to the terms and limitations of your health insurance plan.  Call member services at your health plan with any benefit or coverage questions.                  Your next 10 appointments already scheduled     Jul 06, 2018 11:00 AM CDT   Office Visit with Aura Rosario PA-C   [facility] ([facility])    [address]  [facility] [id number] [phone]           Bring a current list of meds and any records pertaining to this visit. For Physicals, please bring immunization records and  any forms needing to be filled out. Please arrive 10 minutes early to complete paperwork.              Who to contact     If you have questions or need follow up information about today's clinic visit or your schedule please contact [facility] directly at [phone].  Normal or non-critical lab and imaging results will be communicated to you by MyChart, letter or phone within 4 business days after the clinic has received the results. If you do not hear from us within 7 days, please contact the clinic through [employer] or phone. If you have a critical or abnormal lab result, we will notify you by phone as soon as possible.  Submit refill requests through [employer] or call your pharmacy and they will forward the refill request to us. Please allow 3 business days for your refill to be completed.          Additional Information About Your Visit        MyChart Information     [employer] gives you secure access to your electronic health record. If you see a primary care provider, you can also send messages to your care team and make appointments. If you have questions, please call your primary care clinic.  If you do not have a primary care provider, please call [phone] and they will assist you.        Care EveryWhere ID     This is your Care EveryWhere ID. This could be used by other organizations to access your [facility] medical records  [id number]        Your Vitals Were     Pulse Temperature Respirations Pulse Oximetry BMI (Body Mass Index)       78 98.3  F (36.8  C) (Oral) 16 99% 30.73 kg/m2        Blood Pressure from Last 3 Encounters:   07/03/18 118/82   06/19/18 120/72   06/13/18 100/70    Weight from Last 3 Encounters:   07/03/18 168 lb (76.2 kg)   06/19/18 165 lb (74.8 kg)   06/13/18 159 lb (72.1 kg)              We Performed the Following     MENTAL HEALTH REFERRAL  - Adult; Psychiatry and Medication Management; Psychiatry; Other: Not Listed - Enter Referral Details in Scheduling Comments  Below; Patient call to schedule        Primary Care Provider Office Phone # Fax #    Aura DIAZ LOVE Rosario [phone]       [address] [place] [address] [address]  [facility] [address]        Equal Access to Services     VIKY VILLANUEVA : Hadii pilo bishop hadpeteo Soomaali, waaxda luqadaha, qaybta kaalmada adeegyada, susan yoselin cathyn norberto esteelakshmi turner. So [facility] [phone].    ATENCIÓN: Si habla español, tiene a kerr disposición servicios gratuitos de asistencia lingüística. Llame al [phone].    We comply with applicable federal civil rights laws and [place] laws. We do not discriminate on the basis of race, color, national origin, age, disability, sex, sexual orientation, or gender identity.            Thank you!     Thank you for choosing [facility]  for your care. Our goal is always to provide you with excellent care. Hearing back from our patients is one way we can continue to improve our services. Please take a few minutes to complete the written survey that you may receive in the mail after your visit with us. Thank you!             Your Updated Medication List - Protect others around you: Learn how to safely use, store and throw away your medicines at www.disposemymeds.org.          This list is accurate as of 7/3/18  8:49 AM.  Always use your most recent med list.                   Brand Name Dispense Instructions for use Diagnosis    * albuterol 108 (90 Base) MCG/ACT Inhaler    PROAIR HFA/PROVENTIL HFA/VENTOLIN HFA    3 Inhaler    Inhale 2 puffs into the lungs every 6 hours as needed for shortness of breath / dyspnea or wheezing    Mild persistent asthma without complication       * albuterol (2.5 MG/3ML) 0.083% neb solution     50 vial    Take 1 vial (2.5 mg) by nebulization every 6 hours as needed for shortness of breath / dyspnea or wheezing    Mild persistent asthma without complication       ARIPiprazole 15 MG tablet    ABILIFY    30 tablet    Take 1 tablet (15 mg) by  mouth At Bedtime    Adjustment disorder with mixed anxiety and depressed mood, Anxiety       azithromycin 250 MG tablet    ZITHROMAX    6 tablet    Two tablets first day, then one tablet daily for four days.    Acute bronchitis with coexisting condition requiring prophylactic treatment       cetirizine 10 MG tablet    zyrTEC    90 tablet    Take 1 tablet (10 mg) by mouth daily    Mild persistent asthma without complication       cyclobenzaprine 10 MG tablet    FLEXERIL    60 tablet    Take 1 tablet (10 mg) by mouth 2 times daily as needed for muscle spasms or other (back pain)    Chronic bilateral low back pain with left-sided sciatica       dexlansoprazole 60 MG Cpdr CR capsule    DEXILANT    90 capsule    Take 1 capsule (60 mg) by mouth daily    Gastroesophageal reflux disease without esophagitis       diazepam 10 MG tablet    VALIUM    60 tablet    Take 1 tablet (10 mg) by mouth every 12 hours as needed for anxiety or sleep (MUSCLE SPASM) (one month supply)    Chronic pain syndrome, Chronic bilateral low back pain with left-sided sciatica, Lumbar disc disease with radiculopathy, S/P lumbar fusion       dicyclomine 20 MG tablet    BENTYL    60 tablet    Take 1 tablet (20 mg) by mouth 4 times daily as needed (ABDOMINAL CRAMPING) AS NEEDED FOR CRAMPING    Hx of Crohn's disease       DULoxetine 60 MG EC capsule    CYMBALTA    90 capsule    Take 1 capsule (60 mg) by mouth daily    Adjustment disorder with mixed anxiety and depressed mood       EPINEPHrine 0.3 MG/0.3ML injection 2-pack    EPIPEN/ADRENACLICK/or ANY BX GENERIC EQUIV    0.6 mL    Inject 0.3 mLs (0.3 mg) into the muscle once as needed for anaphylaxis    Bee sting allergy       gabapentin 300 MG capsule    NEURONTIN    120 capsule    Take 1-3 capsules (300-900 mg) by mouth 3 times daily    Chronic bilateral low back pain with left-sided sciatica       guaiFENesin-codeine 100-10 MG/5ML Soln solution    ROBITUSSIN AC    120 mL    Take 10 mLs by mouth nightly  as needed for cough    Acute bronchitis with coexisting condition requiring prophylactic treatment       meclizine 12.5 MG tablet    ANTIVERT    30 tablet    Take 1 tablet (12.5 mg) by mouth 4 times daily as needed for dizziness    Dizziness       medical cannabis inhalation (Patient's own supply.  Not a prescription)      Inhale into the lungs 3 times daily as needed Reported on 3/28/2017        naproxen 500 MG tablet    NAPROSYN    60 tablet    Take 1 tablet (500 mg) by mouth 2 times daily (with meals)    Chronic bilateral low back pain with left-sided sciatica       NONFORMULARY      Apply 30 mLs topically 4 times daily        pantoprazole 40 MG EC tablet    PROTONIX    30 tablet    Take 1 tablet (40 mg) by mouth daily Take 30-60 minutes before a meal.    Gastroesophageal reflux disease without esophagitis       promethazine 6.25 MG/5ML syrup    PHENERGAN    560 mL    Take 10 mLs (12.5 mg) by mouth 4 times daily as needed for nausea    Nausea       rizatriptan 5 MG ODT tab    MAXALT-MLT    18 tablet    Take 1-2 tablets (5-10 mg) by mouth at onset of headache for migraine May repeat in 2 hours. Max 6 tablets/24 hours.    Migraine with aura and without status migrainosus, not intractable       sucralfate 1 GM tablet    CARAFATE    60 tablet    Take 1 tablet (1 g) by mouth 4 times daily as needed    Gastroesophageal reflux disease without esophagitis       * Notice:  This list has 2 medication(s) that are the same as other medications prescribed for you. Read the directions carefully, and ask your doctor or other care provider to review them with you.

## 2018-07-03 NOTE — PROGRESS NOTES
SUBJECTIVE:   Katy Islas is a 28 year old female who presents to clinic today for the following health issues:    HPI  ED/UC Followup:    Facility:  [facility]  Date of visit: 6/26/18 and 6/28/18  Reason for visit: GI Bleed/Gerd/Vomitting   Current Status: Started protonix, forgetting to take it in the AM's states that she hasn't thrown up all week - feeling better since she left the ED    - Throwing up and couldn't keep food down  - GI specialist doesn't have any availability soon, didn't schedule      Dr. Kirk Summers - [facility] in [place] (2 years since seen)   - Forgets to take Protonix, but helps a lot        Pt would also like to discuss changing moles, one underneath her bra line on right side - and one on right forearm and left wrist and back of left shoulder   - Dad with melanoma     Referral to see a new counselor as she states her old counselor is resigning.    - Anger   - Feels can't sit still  - Had to kick roommate out, nightmares about that         Problem list and histories reviewed & adjusted, as indicated.  Additional history: as documented      ROS:  Constitutional, HEENT, cardiovascular, pulmonary, gi and gu systems are negative, except as otherwise noted.    OBJECTIVE:   /82 (BP Location: Left arm, Patient Position: Chair, Cuff Size: Adult Regular)  Pulse 78  Temp 98.3  F (36.8  C) (Oral)  Resp 16  Wt 168 lb (76.2 kg)  SpO2 99%  BMI 30.73 kg/m2  Body mass index is 30.73 kg/(m^2).  GENERAL APPEARANCE: healthy, alert and no distress  EYES: Eyes grossly normal to inspection, PERRLA, conjunctivae and sclerae without injection or discharge, EOM intact   RESP: Lungs clear to auscultation - no rales, rhonchi or wheezes   CV: Regular rates and rhythm, normal S1 S2, no S3 or S4, no murmur, click or rub, no peripheral edema and peripheral pulses strong and symmetric bilaterally   ABDOMEN: Soft, nontender, no hepatosplenomegaly, no masses and bowel sounds normal   MS: No  musculoskeletal defects are noted and gait is age appropriate without ataxia   SKIN:   Left forearm - 0.2 x 0.3 cm oval shaped bi-colored and raised mole with regular borders   Right elbow - 0.2 x 0.3 cm bi-colored oval shaped raised mole with irregular borders   Left shoulder - 0.4 x 0.3 cm tri-colored oval shaped raised mole with irregular borders    Right bra line - 0.6 x 0.4 cm oblong flat mole single colored with irregular borders   No other suspicious lesions or rashes, hydration status appears adeuqate with normal skin turgor   PSYCH: Alert and oriented x3; speech- coherent , normal rate and volume; able to articulate logical thoughts, able to abstract reason, no tangential thoughts, no hallucinations or delusions, mentation appears normal, Mood is euthymic. Affect is appropriate for this mood state and bright. Thought content is free of suicidal ideation, hallucinations, and delusions. Dress is adequate and upkept. Eye contact is good during conversation.       Diagnostic Test Results:  none     ASSESSMENT/PLAN:       ICD-10-CM    1. Adjustment disorder with mixed anxiety and depressed mood F43.23 MENTAL HEALTH REFERRAL  - Adult; Psychiatry and Medication Management; Psychiatry; Other: Not Listed - Enter Referral Details in Scheduling Comments Below; Patient call to schedule   2. Bipolar disorder, current episode mixed, severe, without psychotic features (H) F31.63 MENTAL HEALTH REFERRAL  - Adult; Psychiatry and Medication Management; Psychiatry; Other: Not Listed - Enter Referral Details in Scheduling Comments Below; Patient call to schedule   3. Mild major depression (H) F32.0 MENTAL HEALTH REFERRAL  - Adult; Psychiatry and Medication Management; Psychiatry; Other: Not Listed - Enter Referral Details in Scheduling Comments Below; Patient call to schedule   4. Atypical mole D22.9    5. Crohn's disease of colon with other complication (H) K50.118    6. Nausea and vomiting, intractability of vomiting not  specified, unspecified vomiting type R11.2    7. Gastroesophageal reflux disease without esophagitis K21.9      1-3. Mood   - Referral placed for [facility] in [place] for patient to see counseling and psychiatry     4. Moles  - As above, patient with history of atypia in previous biopsies   - Discussed risks and benefits of procedure including bleeding, infection, and scarring   - Recommend procedure as follows:          1) Left forearm - 3 mm punch biopsy, with sutures and removal in 10-14 days              2) Left sholder  - 4 mm punch biopsy, with sutures and removal in 10-14 days          3) Right elbow -  3 mm punch biopsy, with sutures and removal in 10-14 days            4) Right flank - 6 mm punch biopsy, with sutures and removal in 10-14 days           Supplies: 1% lidocaine with epi, 4 alcohol wipes, chloraprep, biopsy blade - 3, 4, & 6 mm punch, lac pack, Sutures: 4-0 Ethilon, sterile 4x4 pack, 4 - sterile 2x2, 4 - small tegaderm    5-7.   - Again reviewed need for GI specialist, has been 2 years since seen by [place]   - Scheduling was able to assist patient in getting set up for appointment in August   - Continue current medications until seen     The patient indicates understanding of these issues and agrees with the plan.    Follow up: mole removal     Due to language barrier, an  was present during the history-taking and subsequent discussion (and for part of the physical exam) with this patient.      Aura Tomlinson-LOVE Moss  [facility]

## 2018-07-06 ENCOUNTER — OFFICE VISIT (OUTPATIENT)
Dept: FAMILY MEDICINE | Facility: OTHER | Age: 28
End: 2018-07-06
Payer: MEDICARE

## 2018-07-06 ENCOUNTER — HOSPITAL ENCOUNTER (OUTPATIENT)
Facility: CLINIC | Age: 28
Setting detail: SPECIMEN
Discharge: HOME | End: 2018-07-06
Admitting: PHYSICIAN ASSISTANT
Payer: MEDICARE

## 2018-07-06 ENCOUNTER — TELEPHONE (OUTPATIENT)
Dept: OTOLARYNGOLOGY | Facility: OTHER | Age: 28
End: 2018-07-06

## 2018-07-06 VITALS
HEART RATE: 68 BPM | DIASTOLIC BLOOD PRESSURE: 94 MMHG | TEMPERATURE: 98.8 F | RESPIRATION RATE: 18 BRPM | BODY MASS INDEX: 31.28 KG/M2 | WEIGHT: 171 LBS | SYSTOLIC BLOOD PRESSURE: 135 MMHG

## 2018-07-06 DIAGNOSIS — D22.9 ATYPICAL MOLE: Primary | ICD-10-CM

## 2018-07-06 DIAGNOSIS — K21.9 GASTROESOPHAGEAL REFLUX DISEASE WITHOUT ESOPHAGITIS: ICD-10-CM

## 2018-07-06 PROCEDURE — 99212 OFFICE O/P EST SF 10 MIN: CPT | Mod: 25 | Performed by: PHYSICIAN ASSISTANT

## 2018-07-06 PROCEDURE — 88305 TISSUE EXAM BY PATHOLOGIST: CPT | Performed by: PHYSICIAN ASSISTANT

## 2018-07-06 PROCEDURE — 88342 IMHCHEM/IMCYTCHM 1ST ANTB: CPT | Mod: 26 | Performed by: PHYSICIAN ASSISTANT

## 2018-07-06 PROCEDURE — 88342 IMHCHEM/IMCYTCHM 1ST ANTB: CPT

## 2018-07-06 PROCEDURE — 11100 HC BIOPSY SKIN/SUBQ/MUC MEM, SINGLE LESION: CPT | Performed by: PHYSICIAN ASSISTANT

## 2018-07-06 PROCEDURE — 11101 HC BIOPSY SKIN/SUBQ/MUC MEM, EACH ADDTL LESION: CPT | Performed by: PHYSICIAN ASSISTANT

## 2018-07-06 PROCEDURE — 88305 TISSUE EXAM BY PATHOLOGIST: CPT | Mod: 26 | Performed by: PHYSICIAN ASSISTANT

## 2018-07-06 ASSESSMENT — PAIN SCALES - GENERAL: PAINLEVEL: NO PAIN (0)

## 2018-07-06 NOTE — PROGRESS NOTES
The following medication was given:     MEDICATION: GI Cocktail  ROUTE: PO  SITE: mouth  DOSE: 15mL GeriLanta, 15 mL Lidocaine 2%  GeriLanta  LOT #: [id number]  :  [employer]  EXPIRATION DATE:  08/2018  NDC#: 93913-318-41  Lidocaine 2%   LOT #: [id number]  :  [employer]  EXPIRATION DATE:  12/2019  NDC#: 07724-235-80    Prior to injection verified patient identity using patient's name and date of birth.  Due to injection administration, patient instructed to remain in clinic for 15 minutes  afterwards, and to report any adverse reaction to me immediately.    Nohemi Jennings RN, BSN

## 2018-07-06 NOTE — PATIENT INSTRUCTIONS
Suture removal 10-14 days    Wound Care Instructions    1.  Keep area dry today.    2.  Starting tomorrow wash gently with soap and water once daily.      3.  Apply Vaseline or antibiotic ointment to the area and cover with a bandage if desired.  Do not let it dry out and form a scab as this will make the resulting scar more noticeable.    4. Protect the area from sun for up to one year afterward as the scar is continuing to remodel.  Sun exposure will also make the resulting scar more noticeable.    5.  Call if the area is very red, tender, has a discharge or is very itchy while healing, or if you have any other questions.  These may be signs of early infection or allergy.                       Wound Closure and Wound Care  What is wound closure?   Wounds heal more quickly and with less risk of infection and scarring when the wound is cleaned and the wound edges are held together (closed). Scrapes, scratches, puncture wounds, and shallow cuts may need only cleaning, ointment, and a bandage. Some cuts may need to be closed with tape strips called Steri-Strips or tissue adhesive liquid (skin glue). If a cut or surgical incision is deep, very long, jagged, or under a lot of tension (such as a cut over a joint), stitches (also called sutures) or staples may be needed to close the wound.   How do I take care of my wound and sutures?   If you get an accidental cut, put pressure on the wound with a gauze pad or clean cloth right away to stop the bleeding. Then gently but thoroughly wash it with soap and cool water. Soapy water can be used around, but not in the cut. Try to remove all dirt and debris but do not scrub vigorously. If you decide to get medical treatment, cover the wound and apply pressure as needed to control bleeding while traveling to your healthcare provider's office, urgent care clinic, or emergency room.   After a wound is closed by your healthcare provider, the wound and the area around it must be kept  "clean and dry. The care of a stapled wound is similar to the care of a sutured wound. There are minor differences in caring for a wound closed with skin glue.   Do not let a wound closed with stitches or staples get wet for the first 24 hours. After 24 hours, you can shower or you can clean the wound with hydrogen peroxide or gently wash it with soap and warm water twice a day.   If your wound was closed with skin glue, keep the wound dry for the first 4 hours after the skin glue was put on. After the first 4 hours, you may occasionally and briefly wet the wound in the shower. You can clean the wound with hydrogen peroxide or gently wash it with soap and water twice a day.   If your wound is closed with Steri-Strips, they may be more likely to separate if they get wet. Keep them dry for the first few days while you're in the shower or bath.   Do not soak or scrub the wound. Do not take a bath, go swimming, or use a hot tub.   If recommended by your healthcare provider, you may put a small amount of antibiotic ointment on the wound each time you clean it. This can prevent infection. It will also help keep bandages from sticking to the wound. If a rash appears, stop using the ointment. If your wound is closed with skin glue, do not put liquid, antibiotic ointment, or any other product on the wound while the adhesive is in place. It may loosen the film before the wound is healed.   Make sure the wound is kept dry between washings. After showering or bathing, gently pat the wound dry with a soft towel.   Your healthcare provider may recommend that you cover the wound with gauze or a new, bandage to keep it from getting dirty. Be sure to keep the bandage dry. Put on a new bandage after cleaning the wound of if the old one gets dirty or wet.   Your healthcare provider may recommend leaving the wound \"open to air\" and not covered by a bandage while you sleep to help speed up the healing process. If the wound was closed " with skin glue, you do not need a bandage.   For the first 1 or 2 days keep the area propped up higher than your heart. This will help lessen your pain and any swelling.   Protect the wound from repeat injury until the skin has had time to heal.   Protect the wound from a lot of exposure to sunlight or tanning lamps while skin glue is in place. Wounds exposed to the sun can become red, while scars that have not been exposed to the sun usually turn white after a period of time.   Do not scratch, rub, or pick at your stitches, staples, or skin glue. This may cause them to loosen before the wound is healed.   Avoid activities that will make you sweat a lot until the skin glue has naturally fallen off or the stitches or staples have been removed.   Any wound can become infected. When you are cleaning your wound, look for these signs of infection:   increased redness   red streaks   increased swelling   increased pain or tenderness   pus or other drainage   warmth in the area of the wound   fever.   Contact your provider if you see any signs of infection.   If your wound was accidental, be sure to ask if a tetanus booster is needed. Treatment of accidental wounds may include taking an oral antibiotic to help prevent infection. Be sure to take the medicine until it is completely gone. Do not stop taking it just because the wound looks like it is healing well.   When are stitches, staples, or other types of wound closures removed?   Steri-Strips are usually left on until they fall off. If they have not fallen off after 2 weeks, they should be removed. Skin glue usually falls off on its own in 5 to 10 days.   For deep cuts the first stitches are placed under the skin. These stitches are made of materials that dissolve and do not need to be removed. Sutures or staples on the surface of the skin need to be removed by your healthcare provider 5 to 14 days after they are put in. The length of time depends on where the cut is.  Sutures in wounds on the face usually can be removed after 5 to 7 days. In areas of high stress, such as hands, knees, or elbows, the sutures must stay in 10 to 14 days. Your provider will tell you when you should come to the office for removal of your sutures or staples. Do NOT remove sutures or staples yourself unless your provider instructs you to do so. Staples are removed using a special tool. If you don't have the tool, don't try to remove the staples.   When should I call my healthcare provider?   Some swelling, redness, and pain are common with all wounds and normally go away as the wound heals.   Call your provider right away if:   You start to have any signs or symptoms of infection. These include:   Your skin is redder or more painful.   You have red streaks from the wound going toward your heart.   The wound area is very warm to touch.   You have pus or other fluid coming from the wound area.   You have a fever higher than 101.5? F (38.6? C).   You have chills, nausea, vomiting, or muscle aches.   The wound seems to be opening up or you notice any drainage.   The wound bleeds for more than 10 minutes.   The stitches or staples are loose.   The skin glue is loosening before it is supposed to.   You have any symptoms that worry you.     Published by [employer].  This content is reviewed periodically and is subject to change as new health information becomes available. The information is intended to inform and educate and is not a replacement for medical evaluation, advice, diagnosis or treatment by a healthcare professional.   Written by Quirino Mcnally MD.   ? 2010 [employer] and/or its affiliates. All Rights Reserved.   Copyright   [employer] 2011

## 2018-07-06 NOTE — MR AVS SNAPSHOT
After Visit Summary   7/6/2018    Katy Islas    MRN: [id number]           Patient Information     Date Of Birth          1990        Visit Information        Provider Department      7/6/2018 11:00 AM Aura Rosario PA-C; [facility]        Today's Diagnoses     Atypical mole    -  1    Gastroesophageal reflux disease without esophagitis          Care Instructions    Suture removal 10-14 days    Wound Care Instructions    1.  Keep area dry today.    2.  Starting tomorrow wash gently with soap and water once daily.      3.  Apply Vaseline or antibiotic ointment to the area and cover with a bandage if desired.  Do not let it dry out and form a scab as this will make the resulting scar more noticeable.    4. Protect the area from sun for up to one year afterward as the scar is continuing to remodel.  Sun exposure will also make the resulting scar more noticeable.    5.  Call if the area is very red, tender, has a discharge or is very itchy while healing, or if you have any other questions.  These may be signs of early infection or allergy.                       Wound Closure and Wound Care  What is wound closure?   Wounds heal more quickly and with less risk of infection and scarring when the wound is cleaned and the wound edges are held together (closed). Scrapes, scratches, puncture wounds, and shallow cuts may need only cleaning, ointment, and a bandage. Some cuts may need to be closed with tape strips called Steri-Strips or tissue adhesive liquid (skin glue). If a cut or surgical incision is deep, very long, jagged, or under a lot of tension (such as a cut over a joint), stitches (also called sutures) or staples may be needed to close the wound.   How do I take care of my wound and sutures?   If you get an accidental cut, put pressure on the wound with a gauze pad or clean cloth right away to stop the bleeding. Then gently but thoroughly wash it with  soap and cool water. Soapy water can be used around, but not in the cut. Try to remove all dirt and debris but do not scrub vigorously. If you decide to get medical treatment, cover the wound and apply pressure as needed to control bleeding while traveling to your healthcare provider's office, urgent care clinic, or emergency room.   After a wound is closed by your healthcare provider, the wound and the area around it must be kept clean and dry. The care of a stapled wound is similar to the care of a sutured wound. There are minor differences in caring for a wound closed with skin glue.   Do not let a wound closed with stitches or staples get wet for the first 24 hours. After 24 hours, you can shower or you can clean the wound with hydrogen peroxide or gently wash it with soap and warm water twice a day.   If your wound was closed with skin glue, keep the wound dry for the first 4 hours after the skin glue was put on. After the first 4 hours, you may occasionally and briefly wet the wound in the shower. You can clean the wound with hydrogen peroxide or gently wash it with soap and water twice a day.   If your wound is closed with Steri-Strips, they may be more likely to separate if they get wet. Keep them dry for the first few days while you're in the shower or bath.   Do not soak or scrub the wound. Do not take a bath, go swimming, or use a hot tub.   If recommended by your healthcare provider, you may put a small amount of antibiotic ointment on the wound each time you clean it. This can prevent infection. It will also help keep bandages from sticking to the wound. If a rash appears, stop using the ointment. If your wound is closed with skin glue, do not put liquid, antibiotic ointment, or any other product on the wound while the adhesive is in place. It may loosen the film before the wound is healed.   Make sure the wound is kept dry between washings. After showering or bathing, gently pat the wound dry with a  "soft towel.   Your healthcare provider may recommend that you cover the wound with gauze or a new, bandage to keep it from getting dirty. Be sure to keep the bandage dry. Put on a new bandage after cleaning the wound of if the old one gets dirty or wet.   Your healthcare provider may recommend leaving the wound \"open to air\" and not covered by a bandage while you sleep to help speed up the healing process. If the wound was closed with skin glue, you do not need a bandage.   For the first 1 or 2 days keep the area propped up higher than your heart. This will help lessen your pain and any swelling.   Protect the wound from repeat injury until the skin has had time to heal.   Protect the wound from a lot of exposure to sunlight or tanning lamps while skin glue is in place. Wounds exposed to the sun can become red, while scars that have not been exposed to the sun usually turn white after a period of time.   Do not scratch, rub, or pick at your stitches, staples, or skin glue. This may cause them to loosen before the wound is healed.   Avoid activities that will make you sweat a lot until the skin glue has naturally fallen off or the stitches or staples have been removed.   Any wound can become infected. When you are cleaning your wound, look for these signs of infection:   increased redness   red streaks   increased swelling   increased pain or tenderness   pus or other drainage   warmth in the area of the wound   fever.   Contact your provider if you see any signs of infection.   If your wound was accidental, be sure to ask if a tetanus booster is needed. Treatment of accidental wounds may include taking an oral antibiotic to help prevent infection. Be sure to take the medicine until it is completely gone. Do not stop taking it just because the wound looks like it is healing well.   When are stitches, staples, or other types of wound closures removed?   Steri-Strips are usually left on until they fall off. If they " have not fallen off after 2 weeks, they should be removed. Skin glue usually falls off on its own in 5 to 10 days.   For deep cuts the first stitches are placed under the skin. These stitches are made of materials that dissolve and do not need to be removed. Sutures or staples on the surface of the skin need to be removed by your healthcare provider 5 to 14 days after they are put in. The length of time depends on where the cut is. Sutures in wounds on the face usually can be removed after 5 to 7 days. In areas of high stress, such as hands, knees, or elbows, the sutures must stay in 10 to 14 days. Your provider will tell you when you should come to the office for removal of your sutures or staples. Do NOT remove sutures or staples yourself unless your provider instructs you to do so. Staples are removed using a special tool. If you don't have the tool, don't try to remove the staples.   When should I call my healthcare provider?   Some swelling, redness, and pain are common with all wounds and normally go away as the wound heals.   Call your provider right away if:   You start to have any signs or symptoms of infection. These include:   Your skin is redder or more painful.   You have red streaks from the wound going toward your heart.   The wound area is very warm to touch.   You have pus or other fluid coming from the wound area.   You have a fever higher than 101.5? F (38.6? C).   You have chills, nausea, vomiting, or muscle aches.   The wound seems to be opening up or you notice any drainage.   The wound bleeds for more than 10 minutes.   The stitches or staples are loose.   The skin glue is loosening before it is supposed to.   You have any symptoms that worry you.     Published by [employer].  This content is reviewed periodically and is subject to change as new health information becomes available. The information is intended to inform and educate and is not a replacement for medical evaluation, advice,  diagnosis or treatment by a healthcare professional.   Written by Quirino Mcnally MD.   ? 2010 [facility] and/or its affiliates. All Rights Reserved.   Copyright   [employer] 2011                  Follow-ups after your visit        Who to contact     If you have questions or need follow up information about today's clinic visit or your schedule please contact [facility] directly at [phone].  Normal or non-critical lab and imaging results will be communicated to you by MyChart, letter or phone within 4 business days after the clinic has received the results. If you do not hear from us within 7 days, please contact the clinic through [employer] or phone. If you have a critical or abnormal lab result, we will notify you by phone as soon as possible.  Submit refill requests through [email or web address] or call your pharmacy and they will forward the refill request to us. Please allow 3 business days for your refill to be completed.          Additional Information About Your Visit        [employer] Information     [email or web address] gives you secure access to your electronic health record. If you see a primary care provider, you can also send messages to your care team and make appointments. If you have questions, please call your primary care clinic.  If you do not have a primary care provider, please call [phone] and they will assist you.        Care EveryWhere ID     This is your Care EveryWhere ID. This could be used by other organizations to access your [place] medical records  [id number]        Your Vitals Were     Pulse Temperature Respirations BMI (Body Mass Index)          68 98.8  F (37.1  C) (Temporal) 18 31.28 kg/m2         Blood Pressure from Last 3 Encounters:   07/06/18 (!) 135/94   07/03/18 118/82   06/19/18 120/72    Weight from Last 3 Encounters:   07/06/18 171 lb (77.6 kg)   07/03/18 168 lb (76.2 kg)   06/19/18 165 lb (74.8 kg)              Today, you had the following     No orders  found for display         Today's Medication Changes          These changes are accurate as of 7/6/18 12:01 PM.  If you have any questions, ask your nurse or doctor.               Start taking these medicines.        Dose/Directions    lidocaine (viscous) 15 mL, alum & mag hydroxide-simethicone 15 mL GI Cocktail   Used for:  Gastroesophageal reflux disease without esophagitis   Started by:  Aura Rosario PA-C        Dose:  30 mL   Take 30 mLs by mouth once for 1 dose   Quantity:  30 mL   Refills:  0            Where to get your medicines      Some of these will need a paper prescription and others can be bought over the counter.  Ask your nurse if you have questions.     Bring a paper prescription for each of these medications     lidocaine (viscous) 15 mL, alum & mag hydroxide-simethicone 15 mL GI Cocktail                Primary Care Provider Office Phone # Fax #    Aura Rosario PA-C [phone]       [address] [place] [address] [address]  [facility] [id number]        Equal Access to Services     PRATIK VILLANUEVA : Hadii aad ku hadasho Soomaali, waaxda luqadaha, qaybta kaalmada adeegyada, waxay calebin brayden santo . [facility] [phone].    ATENCIÓN: Si habla español, tiene a kerr disposición servicios gratuitos de asistencia lingüística. [facility] [phone].    We comply with applicable federal civil rights laws and [place] laws. We do not discriminate on the basis of race, color, national origin, age, disability, sex, sexual orientation, or gender identity.            Thank you!     Thank you for choosing [facility]  for your care. Our goal is always to provide you with excellent care. Hearing back from our patients is one way we can continue to improve our services. Please take a few minutes to complete the written survey that you may receive in the mail after your visit with us. Thank you!             Your Updated Medication List - Protect others  around you: Learn how to safely use, store and throw away your medicines at www.disposemymeds.org.          This list is accurate as of 7/6/18 12:01 PM.  Always use your most recent med list.                   Brand Name Dispense Instructions for use Diagnosis    * albuterol 108 (90 Base) MCG/ACT Inhaler    PROAIR HFA/PROVENTIL HFA/VENTOLIN HFA    3 Inhaler    Inhale 2 puffs into the lungs every 6 hours as needed for shortness of breath / dyspnea or wheezing    Mild persistent asthma without complication       * albuterol (2.5 MG/3ML) 0.083% neb solution     50 vial    Take 1 vial (2.5 mg) by nebulization every 6 hours as needed for shortness of breath / dyspnea or wheezing    Mild persistent asthma without complication       ARIPiprazole 15 MG tablet    ABILIFY    30 tablet    Take 1 tablet (15 mg) by mouth At Bedtime    Adjustment disorder with mixed anxiety and depressed mood, Anxiety       azithromycin 250 MG tablet    ZITHROMAX    6 tablet    Two tablets first day, then one tablet daily for four days.    Acute bronchitis with coexisting condition requiring prophylactic treatment       cetirizine 10 MG tablet    zyrTEC    90 tablet    Take 1 tablet (10 mg) by mouth daily    Mild persistent asthma without complication       cyclobenzaprine 10 MG tablet    FLEXERIL    60 tablet    Take 1 tablet (10 mg) by mouth 2 times daily as needed for muscle spasms or other (back pain)    Chronic bilateral low back pain with left-sided sciatica       dexlansoprazole 60 MG Cpdr CR capsule    DEXILANT    90 capsule    Take 1 capsule (60 mg) by mouth daily    Gastroesophageal reflux disease without esophagitis       diazepam 10 MG tablet    VALIUM    60 tablet    Take 1 tablet (10 mg) by mouth every 12 hours as needed for anxiety or sleep (MUSCLE SPASM) (one month supply)    Chronic pain syndrome, Chronic bilateral low back pain with left-sided sciatica, Lumbar disc disease with radiculopathy, S/P lumbar fusion       dicyclomine  20 MG tablet    BENTYL    60 tablet    Take 1 tablet (20 mg) by mouth 4 times daily as needed (ABDOMINAL CRAMPING) AS NEEDED FOR CRAMPING    Hx of Crohn's disease       DULoxetine 60 MG EC capsule    CYMBALTA    90 capsule    Take 1 capsule (60 mg) by mouth daily    Adjustment disorder with mixed anxiety and depressed mood       EPINEPHrine 0.3 MG/0.3ML injection 2-pack    EPIPEN/ADRENACLICK/or ANY BX GENERIC EQUIV    0.6 mL    Inject 0.3 mLs (0.3 mg) into the muscle once as needed for anaphylaxis    Bee sting allergy       gabapentin 300 MG capsule    NEURONTIN    120 capsule    Take 1-3 capsules (300-900 mg) by mouth 3 times daily    Chronic bilateral low back pain with left-sided sciatica       guaiFENesin-codeine 100-10 MG/5ML Soln solution    ROBITUSSIN AC    120 mL    Take 10 mLs by mouth nightly as needed for cough    Acute bronchitis with coexisting condition requiring prophylactic treatment       lidocaine (viscous) 15 mL, alum & mag hydroxide-simethicone 15 mL GI Cocktail     30 mL    Take 30 mLs by mouth once for 1 dose    Gastroesophageal reflux disease without esophagitis       meclizine 12.5 MG tablet    ANTIVERT    30 tablet    Take 1 tablet (12.5 mg) by mouth 4 times daily as needed for dizziness    Dizziness       medical cannabis inhalation (Patient's own supply.  Not a prescription)      Inhale into the lungs 3 times daily as needed Reported on 3/28/2017        naproxen 500 MG tablet    NAPROSYN    60 tablet    Take 1 tablet (500 mg) by mouth 2 times daily (with meals)    Chronic bilateral low back pain with left-sided sciatica       NONFORMULARY      Apply 30 mLs topically 4 times daily        pantoprazole 40 MG EC tablet    PROTONIX    30 tablet    Take 1 tablet (40 mg) by mouth daily Take 30-60 minutes before a meal.    Gastroesophageal reflux disease without esophagitis       promethazine 6.25 MG/5ML syrup    PHENERGAN    560 mL    Take 10 mLs (12.5 mg) by mouth 4 times daily as needed for  nausea    Nausea       rizatriptan 5 MG ODT tab    MAXALT-MLT    18 tablet    Take 1-2 tablets (5-10 mg) by mouth at onset of headache for migraine May repeat in 2 hours. Max 6 tablets/24 hours.    Migraine with aura and without status migrainosus, not intractable       sucralfate 1 GM tablet    CARAFATE    60 tablet    Take 1 tablet (1 g) by mouth 4 times daily as needed    Gastroesophageal reflux disease without esophagitis       * Notice:  This list has 2 medication(s) that are the same as other medications prescribed for you. Read the directions carefully, and ask your doctor or other care provider to review them with you.

## 2018-07-06 NOTE — TELEPHONE ENCOUNTER
Pt reports she has a hard tube in her rt side hearing aid.  She would like this replaced.  She also is asking if she can talk to someone about getting a lt side cochlear implant.  She states she has already had a head CT.  Please call pt or send [employer] message.  thanks

## 2018-07-06 NOTE — TELEPHONE ENCOUNTER
Emailed patient with instructions to either schedule appointment with me or drop off hearing aid for tube replacement.    Also included information for patient to schedule CI eval, and link for audiology services at [facility] [place].  Amira Brown.  Licensed Audiologist, [place] #[id number]  [facility]  7/6/2018

## 2018-07-07 ASSESSMENT — ASTHMA QUESTIONNAIRES: ACT_TOTALSCORE: 21

## 2018-07-13 ENCOUNTER — HOSPITAL PATHOLOGY (OUTPATIENT)
Dept: OTHER | Facility: CLINIC | Age: 28
End: 2018-07-13

## 2018-07-16 ENCOUNTER — HOSPITAL ENCOUNTER (EMERGENCY)
Facility: CLINIC | Age: 28
Discharge: HOME | End: 2018-07-16
Attending: PHYSICIAN ASSISTANT | Admitting: PHYSICIAN ASSISTANT
Payer: MEDICARE

## 2018-07-16 ENCOUNTER — TELEPHONE (OUTPATIENT)
Dept: FAMILY MEDICINE | Facility: OTHER | Age: 28
End: 2018-07-16

## 2018-07-16 VITALS
DIASTOLIC BLOOD PRESSURE: 61 MMHG | HEART RATE: 104 BPM | RESPIRATION RATE: 18 BRPM | OXYGEN SATURATION: 98 % | TEMPERATURE: 98.6 F | BODY MASS INDEX: 29.26 KG/M2 | SYSTOLIC BLOOD PRESSURE: 101 MMHG | WEIGHT: 160 LBS

## 2018-07-16 DIAGNOSIS — R10.13 EPIGASTRIC PAIN: ICD-10-CM

## 2018-07-16 DIAGNOSIS — R11.2 NON-INTRACTABLE VOMITING WITH NAUSEA, UNSPECIFIED VOMITING TYPE: ICD-10-CM

## 2018-07-16 LAB
ALBUMIN SERPL-MCNC: 3.5 G/DL (ref 3.4–5)
ALP SERPL-CCNC: 80 U/L (ref 40–150)
ALT SERPL W P-5'-P-CCNC: 21 U/L (ref 0–50)
ANION GAP SERPL CALCULATED.3IONS-SCNC: 10 MMOL/L (ref 3–14)
AST SERPL W P-5'-P-CCNC: 13 U/L (ref 0–45)
BASOPHILS # BLD AUTO: 0 10E9/L (ref 0–0.2)
BASOPHILS NFR BLD AUTO: 0.4 %
BILIRUB SERPL-MCNC: 0.6 MG/DL (ref 0.2–1.3)
BUN SERPL-MCNC: 21 MG/DL (ref 7–30)
CALCIUM SERPL-MCNC: 8.4 MG/DL (ref 8.5–10.1)
CHLORIDE SERPL-SCNC: 107 MMOL/L (ref 94–109)
CO2 SERPL-SCNC: 26 MMOL/L (ref 20–32)
CREAT SERPL-MCNC: 0.91 MG/DL (ref 0.52–1.04)
CRP SERPL-MCNC: <2.9 MG/L (ref 0–8)
DIFFERENTIAL METHOD BLD: ABNORMAL
EOSINOPHIL NFR BLD AUTO: 1.8 %
ERYTHROCYTE [DISTWIDTH] IN BLOOD BY AUTOMATED COUNT: 11.7 % (ref 10–15)
ERYTHROCYTE [SEDIMENTATION RATE] IN BLOOD BY WESTERGREN METHOD: 8 MM/H (ref 0–20)
GFR SERPL CREATININE-BSD FRML MDRD: 73 ML/MIN/1.7M2
GLUCOSE SERPL-MCNC: 82 MG/DL (ref 70–99)
HCT VFR BLD AUTO: 38.1 % (ref 35–47)
HGB BLD-MCNC: 13.2 G/DL (ref 11.7–15.7)
IMM GRANULOCYTES # BLD: 0 10E9/L (ref 0–0.4)
IMM GRANULOCYTES NFR BLD: 0.5 %
LYMPHOCYTES # BLD AUTO: 1.7 10E9/L (ref 0.8–5.3)
LYMPHOCYTES NFR BLD AUTO: 29.7 %
MCH RBC QN AUTO: 33.6 PG (ref 26.5–33)
MCHC RBC AUTO-ENTMCNC: 34.6 G/DL (ref 31.5–36.5)
MCV RBC AUTO: 97 FL (ref 78–100)
MONOCYTES # BLD AUTO: 0.6 10E9/L (ref 0–1.3)
MONOCYTES NFR BLD AUTO: 10.1 %
NEUTROPHILS # BLD AUTO: 3.3 10E9/L (ref 1.6–8.3)
NEUTROPHILS NFR BLD AUTO: 57.5 %
NRBC # BLD AUTO: 0 10*3/UL
NRBC BLD AUTO-RTO: 0 /100
PLATELET # BLD AUTO: 238 10E9/L (ref 150–450)
POTASSIUM SERPL-SCNC: 4.2 MMOL/L (ref 3.4–5.3)
PROT SERPL-MCNC: 6.8 G/DL (ref 6.8–8.8)
RBC # BLD AUTO: 3.93 10E12/L (ref 3.8–5.2)
SODIUM SERPL-SCNC: 143 MMOL/L (ref 133–144)
WBC # BLD AUTO: 5.7 10E9/L (ref 4–11)

## 2018-07-16 PROCEDURE — 99284 EMERGENCY DEPT VISIT MOD MDM: CPT | Mod: 25 | Performed by: PHYSICIAN ASSISTANT

## 2018-07-16 PROCEDURE — A9270 NON-COVERED ITEM OR SERVICE: HCPCS | Mod: GY | Performed by: PHYSICIAN ASSISTANT

## 2018-07-16 PROCEDURE — 96374 THER/PROPH/DIAG INJ IV PUSH: CPT | Performed by: PHYSICIAN ASSISTANT

## 2018-07-16 PROCEDURE — 85025 COMPLETE CBC W/AUTO DIFF WBC: CPT | Performed by: PHYSICIAN ASSISTANT

## 2018-07-16 PROCEDURE — 86140 C-REACTIVE PROTEIN: CPT | Performed by: PHYSICIAN ASSISTANT

## 2018-07-16 PROCEDURE — 85652 RBC SED RATE AUTOMATED: CPT | Performed by: PHYSICIAN ASSISTANT

## 2018-07-16 PROCEDURE — 36415 COLL VENOUS BLD VENIPUNCTURE: CPT | Performed by: PHYSICIAN ASSISTANT

## 2018-07-16 PROCEDURE — 80053 COMPREHEN METABOLIC PANEL: CPT | Performed by: PHYSICIAN ASSISTANT

## 2018-07-16 PROCEDURE — 25000128 H RX IP 250 OP 636: Performed by: PHYSICIAN ASSISTANT

## 2018-07-16 PROCEDURE — 99284 EMERGENCY DEPT VISIT MOD MDM: CPT | Mod: Z6 | Performed by: PHYSICIAN ASSISTANT

## 2018-07-16 PROCEDURE — 25000132 ZZH RX MED GY IP 250 OP 250 PS 637: Mod: GY | Performed by: PHYSICIAN ASSISTANT

## 2018-07-16 RX ORDER — DIPHENHYDRAMINE HCL 25 MG
25 CAPSULE ORAL ONCE
Status: DISCONTINUED | OUTPATIENT
Start: 2018-07-16 | End: 2018-07-16 | Stop reason: HOSPADM

## 2018-07-16 RX ORDER — PROMETHAZINE HYDROCHLORIDE 25 MG/ML
25 INJECTION, SOLUTION INTRAMUSCULAR; INTRAVENOUS ONCE
Status: COMPLETED | OUTPATIENT
Start: 2018-07-16 | End: 2018-07-16

## 2018-07-16 RX ORDER — HYDROCODONE BITARTRATE AND ACETAMINOPHEN 5; 325 MG/1; MG/1
1 TABLET ORAL ONCE
Status: COMPLETED | OUTPATIENT
Start: 2018-07-16 | End: 2018-07-16

## 2018-07-16 RX ADMIN — PROMETHAZINE HYDROCHLORIDE 25 MG: 25 INJECTION INTRAMUSCULAR; INTRAVENOUS at 19:03

## 2018-07-16 RX ADMIN — HYDROCODONE BITARTRATE AND ACETAMINOPHEN 1 TABLET: 5; 325 TABLET ORAL at 20:33

## 2018-07-16 RX ADMIN — SODIUM CHLORIDE 1000 ML: 9 INJECTION, SOLUTION INTRAVENOUS at 19:19

## 2018-07-16 NOTE — TELEPHONE ENCOUNTER
Katy Islas is a 28 year old female who calls with complaints of nausea, vomiting, tarry stools and bright red blood via.    NURSING ASSESSMENT:  Description:  Via sign language telephone . Since last week reports nausea and vomiting. Now reporting bright red blood with clots in her emesis, and dark tarry stools with bright red in her stool since yesterday.  Onset/duration:  Last week  Precip. factors:  History of Crohn's disease with blood via rectum.  Associated symptoms:  Nausea, vomiting.    Pain scale (0-10)   6/10    Allergies:   Allergies   Allergen Reactions     Ativan [Lorazepam] Hives     At the IV site     Baclofen      hives     Bees Hives, Swelling and Difficulty breathing     Caffeine      Contrast Dye      Hives,   Updated 5/10/2016 CT Contrast.     Iodine Hives     Methocarbamol Swelling     Metoclopramide      Other reaction(s): Tremors  LW Reaction: shaking/sweating     Midazolam      Other reaction(s): Agitation     Monosodium Glutamate      Morphine Other (See Comments)     Difficulty with urination     Nsaids Other (See Comments)     GI BLEED x2     Other (Do Not Use) Other (See Comments)     Xanaflex- pt becomes disoriented and loses bladder control     Reglan [Metoclopramide Hcl] Other (See Comments)     shaking     Soma [Carisoprodol] Visual Disturbance     Sleep walking     Tizanidine      Topamax Other (See Comments)     Topamax [Topiramate] Nausea     Tingling  GI/Vomit     Tramadol      Severe Headache, Seizure Risk     Tylenol W/Codeine [Acetaminophen-Codeine] Nausea and Itching     Tylenol 3     Valium Other (See Comments)     Becomes aggressive and angry     Versed Other (See Comments)     Zolmitriptan      Makes face feel like its twitching     Droperidol Anxiety     Flu Virus Vaccine Rash      Arm swelling       NURSING PLAN: Nursing advice to patient To ED for assessment    RECOMMENDED DISPOSITION:  To ED  Will comply with recommendation: Yes  If further  questions/concerns or if symptoms do not improve, worsen or new symptoms develop, call your PCP or [facility] as soon as possible.      Guideline used:  Telephone Triage Protocols for Nurses, Fifth Edition, Telma Smith RN

## 2018-07-16 NOTE — ED PROVIDER NOTES
History     Chief Complaint   Patient presents with     Rectal Bleeding     Abdominal Pain     HPI   History obtained required an interpretor, as she is legally deaf requiring a ASL  and communicates through sign language.     Katy Islas is a 28 year old female who presents to the emergency department with 2 day history of abdominal pain, nausea with vomiting and diarrhea. Patient notes 3 episodes of vomiting per day containing blood clots and 4 episodes of diarrhea since yesterday. Patient notes worsening upper abdominal pain, radiating through to back, and increasing volumes of blood in both vomit and stool. Patient notes inability to keep down any food and liquids. She believes stress has caused a flare in her Crohn's illness, and describes an episode last week but states that spontaneously resolved. Patient reported prednisone, flagyl, dilaudid, and promethazine usually help her symptoms like this.  She denies fever or chills.  No urinary symptoms.  She has tried her home with nausea medicine without relief.    When asked if she has been seen for this complaint yet, the patient states that she was seen for similar symptoms a year ago.  However via care everywhere it was found that she was actually seen yesterday for the same complaint.  MDM as below:    Impression and Plan:  This is a complicated 28-year-old female who presents to the emergency department for evaluation of abdominal pain and hematemesis. Notably, review of her chart reveals that she does have a history of renal disease. However, she also has a history of seeking behavior and has had multiple evaluations that have been negative. Additionally, she has been noncompliant with GI follow-up. Given her concerns and history of Crohn's I did reevaluate her here. However, I did not give her opioid pain medications or Phenergan as she requested on multiple occasions during her visit. She is afebrile and has no focal abdominal tenderness  on exam. Her laboratory workup is reassuring and she has no evidence for underlying infection. She did have a mildly elevated BUN but otherwise has no electrolyte abnormalities and her hemoglobin is normal. Given her stability and lack of ongoing hematemesis here I feel that she is safe for continued PPI and prompt follow-up with her primary or gastroenterology for further workup of possible underlying ulcer. I also encouraged her to return to the emergency department if she has new concerns or any worsening of her symptoms. Following her evaluation and treatment here she was discharged home.    Diagnosis:   ENCOUNTER DIAGNOSES   ICD-10-CM   1. Epigastric pain R10.13   2. Hematemesis with nausea K92.0       Robert Mullen MD  7/15/2018   [facility] EMERGENCY ROOM  [facility]      Problem List:    Patient Active Problem List    Diagnosis Date Noted     Iliotibial band syndrome affecting lower leg, right 12/21/2017     Priority: Medium     Chondromalacia of right patellofemoral joint 12/21/2017     Priority: Medium     Postoperative pain 11/13/2017     Priority: Medium     Closed fracture of coccyx with nonunion, subsequent encounter 10/12/2017     Priority: Medium     Upper GI bleed - suspected 07/01/2017     Priority: Medium     Tobacco abuse 07/01/2017     Priority: Medium     History of pseudoseizure 07/01/2017     Priority: Medium     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) 03/10/2017     Priority: Medium     Lumbar disc disease with radiculopathy 02/23/2017     Priority: Medium     S/P lumbar fusion 02/23/2017     Priority: Medium     Dr. YOVANI Millan, 2/23/17       Vitamin D deficiency 01/06/2017     Priority: Medium     Atypical mole 01/06/2017     Priority: Medium     Mild persistent asthma without complication 12/02/2016     Priority: Medium     Chronic bilateral low back pain with left-sided sciatica 12/02/2016     Priority: Medium     Bee sting allergy 09/16/2016      Priority: Medium     Gastroesophageal reflux disease without esophagitis 08/26/2016     Priority: Medium     Herpes simplex virus infection 11/12/2015     Priority: Medium     Adjustment disorder with mixed anxiety and depressed mood 10/14/2015     Priority: Medium     Marijuana abuse 02/22/2015     Priority: Medium     Grand mal seizure disorder (H) 10/08/2013     Priority: Medium     Bipolar disorder (H) 01/31/2013     Priority: Medium     Problem list name updated by automated process. Provider to review       Nausea and vomiting 12/13/2012     Priority: Medium     Oral thrush 06/25/2012     Priority: Medium     Chronic pain 02/09/2012     Priority: Medium     Patient is followed by Aura Rosario PA-C for ongoing prescription of pain medication.  All refills should only be approved by this provider, or covering partner.    Medication(s): Norco    Maximum quantity per month: 70  Clinic visit frequency required: Q 2 months     Controlled substance agreement:   Discussed and signed 4/25/17    Encounter-Level CSA - 01/12/2015:                 Controlled Substance Agreement - Scan on 1/26/2015  9:14 AM : Controlled Medication Agreement-01/12/15 (below)            Pain Clinic evaluation in the past: Yes       Date/Location:   [facility], fall 2016    DIRE Total Score(s):    4/25/2017   Total Score 17       Last [facility] website verification:  done on 4/25/17 by LOVE Maki   [email or web address]           Anxiety 09/22/2011     Priority: Medium     Mild major depression (H) 08/29/2011     Priority: Medium     Crohn's colitis (H) 08/04/2009     Priority: Medium     Dysmenorrhea 05/11/2007     Priority: Medium     Benign neoplasm of skin of lower limb, including hip 03/16/2004     Priority: Medium     Migraine      Priority: Medium     Dr. Farrar - Neurology.  Now on Inderal.  Seems to be working.  Follow-up 9/08  Problem list name updated by automated process. Provider to review        Hearing loss      Priority: Medium     Congenital  Problem list name updated by automated process. Provider to review       Allergic rhinitis      Priority: Medium     Problem list name updated by automated process. Provider to review          Past Medical History:    Past Medical History:   Diagnosis Date     Abdominal pain 10/31- 11/4/2005     Allergic rhinitis, cause unspecified      Arthritis      C. 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     FUSION SPINE POSTERIOR MINIMALLY INVASIVE ONE LEVEL N/A 2/23/2017    Procedure: FUSION SPINE POSTERIOR MINIMALLY INVASIVE ONE LEVEL;  L4-5 Oblique Lateral Lumbar Interbody Fusion   Epidural steroid injection.   Transpedicular Bone marrow aspiration;  Surgeon: Jeniffer Eugene MD;  Location: [facility] OR     HC COLONOSCOPY THRU STOMA WITH BIOPSY  10/29/2003    Impression is that of normal appearing colonoscopy, without evidence of rectal bleeding.     HC COLONOSCOPY THRU STOMA, DIAGNOSTIC  10/00    normal     HC COLONOSCOPY THRU STOMA, DIAGNOSTIC  Oct 2009    Dr López- normal     HC EEG AWAKE AND SLEEP      abnormal     HC MRI BRAIN W/O CONTRAST  12/00    normal     HC REMOVAL GALLBLADDER  8/5/2009    [facility], [facility].     HC  UGI ENDOSCOPY DIAG W BIOPSY  11/11/09    Normal esophagus     HC UGI ENDOSCOPY, SIMPLE EXAM  7/00, 10/00    mild chronic esophagitis and duodenitis, neg H pylori     HC UGI ENDOSCOPY, SIMPLE EXAM  01/20/2005    Esophagogastroduodenoscopy, colonoscopy with biopsies.  [facility]     HC UGI ENDOSCOPY, SIMPLE EXAM  7/1/2009    [facility], [place]     HC UGI ENDOSCOPY, SIMPLE EXAM  11/11/2009    attempted upper GI, pt. could not tolerate procedure:[facility]     ORTHOPEDIC SURGERY  October 19,2011    diskectomy L4-L5       Family History:    Family History   Problem Relation Age of Onset     GASTROINTESTINAL DISEASE Brother      severe Crohn's     Neurologic Disorder Brother      Seizures post head injury     Depression Brother      Substance Abuse Brother      Genitourinary Problems Father      kidney stones     Diabetes Father      HEART DISEASE Father      Open heart surgery     Breast Cancer Maternal Grandmother      Parkinsonism Maternal Grandmother      Cerebrovascular Disease Paternal Grandmother      Cancer Maternal Grandfather      Lung     Cardiovascular Paternal Grandfather      Heart Attack     Substance Abuse Mother      Lung Cancer Paternal Uncle      Cancer Paternal Uncle      Lung Cancer Maternal Uncle        Social History:  Marital Status:  Single [1]  Social History   Substance Use Topics     Smoking status: Current Every Day Smoker     Packs/day: 0.50     Types: Cigarettes     Smokeless tobacco: Never Used      Comment: pt is trying to quit      Alcohol use 0.6 oz/week     1 Cans of beer per week      Comment: once in while         Medications:      albuterol (2.5 MG/3ML) 0.083% neb solution   albuterol (PROAIR HFA/PROVENTIL HFA/VENTOLIN HFA) 108 (90 Base) MCG/ACT Inhaler   ARIPiprazole (ABILIFY) 15 MG tablet   azithromycin (ZITHROMAX) 250 MG tablet   cetirizine (ZYRTEC) 10 MG tablet   cyclobenzaprine (FLEXERIL) 10 MG tablet   dexlansoprazole (DEXILANT) 60 MG  CPDR CR capsule   diazepam (VALIUM) 10 MG tablet   dicyclomine (BENTYL) 20 MG tablet   DULoxetine (CYMBALTA) 60 MG EC capsule   EPINEPHrine (EPIPEN/ADRENACLICK/OR ANY BX GENERIC EQUIV) 0.3 MG/0.3ML injection 2-pack   gabapentin (NEURONTIN) 300 MG capsule   guaiFENesin-codeine (ROBITUSSIN AC) 100-10 MG/5ML SOLN solution   meclizine (ANTIVERT) 12.5 MG tablet   medical cannabis inhalation (Patient's own supply.  Not a prescription)   naproxen (NAPROSYN) 500 MG tablet   NONFORMULARY   pantoprazole (PROTONIX) 40 MG EC tablet   promethazine (PHENERGAN) 6.25 MG/5ML syrup   rizatriptan (MAXALT-MLT) 5 MG ODT tab   sucralfate (CARAFATE) 1 GM tablet         Review of Systems   All other systems reviewed and are negative.      Physical Exam   BP: 126/83  Pulse: 106  Temp: 98.6  F (37  C)  Resp: 20  Weight: 72.6 kg (160 lb)  SpO2: 97 %      Physical Exam   Constitutional: She is oriented to person, place, and time. She appears well-developed and well-nourished. No distress.   HENT:   Head: Normocephalic and atraumatic.   Mouth/Throat: Oropharynx is clear and moist.   Eyes: Conjunctivae are normal. Pupils are equal, round, and reactive to light. No scleral icterus.   Neck: Neck supple.   Cardiovascular: Normal rate, regular rhythm, normal heart sounds and intact distal pulses.    Pulmonary/Chest: Effort normal and breath sounds normal. No respiratory distress.   Abdominal: Soft. Bowel sounds are normal. She exhibits no distension. There is tenderness (minimal epigastric). There is no rebound and no guarding.   When distracted during abdominal exam, no pain elicited with palpation.   Musculoskeletal: She exhibits no edema or deformity.   Neurological: She is alert and oriented to person, place, and time.   Hard of hearing   Skin: Skin is warm and dry. No rash noted. She is not diaphoretic.   Psychiatric: She has a normal mood and affect.   Nursing note and vitals reviewed.      ED Course     ED Course     Procedures    Results for  orders placed or performed during the hospital encounter of 07/16/18 (from the past 24 hour(s))   CBC with platelets differential   Result Value Ref Range    WBC 5.7 4.0 - 11.0 10e9/L    RBC Count 3.93 3.8 - 5.2 10e12/L    Hemoglobin 13.2 11.7 - 15.7 g/dL    Hematocrit 38.1 35.0 - 47.0 %    MCV 97 78 - 100 fl    MCH 33.6 (H) 26.5 - 33.0 pg    MCHC 34.6 31.5 - 36.5 g/dL    RDW 11.7 10.0 - 15.0 %    Platelet Count 238 150 - 450 10e9/L    Diff Method Automated Method     % Neutrophils 57.5 %    % Lymphocytes 29.7 %    % Monocytes 10.1 %    % Eosinophils 1.8 %    % Basophils 0.4 %    % Immature Granulocytes 0.5 %    Nucleated RBCs 0 0 /100    Absolute Neutrophil 3.3 1.6 - 8.3 10e9/L    Absolute Lymphocytes 1.7 0.8 - 5.3 10e9/L    Absolute Monocytes 0.6 0.0 - 1.3 10e9/L    Absolute Basophils 0.0 0.0 - 0.2 10e9/L    Abs Immature Granulocytes 0.0 0 - 0.4 10e9/L    Absolute Nucleated RBC 0.0    Comprehensive metabolic panel   Result Value Ref Range    Sodium 143 133 - 144 mmol/L    Potassium 4.2 3.4 - 5.3 mmol/L    Chloride 107 94 - 109 mmol/L    Carbon Dioxide 26 20 - 32 mmol/L    Anion Gap 10 3 - 14 mmol/L    Glucose 82 70 - 99 mg/dL    Urea Nitrogen 21 7 - 30 mg/dL    Creatinine 0.91 0.52 - 1.04 mg/dL    GFR Estimate 73 >60 mL/min/1.7m2    GFR Estimate If Black 88 >60 mL/min/1.7m2    Calcium 8.4 (L) 8.5 - 10.1 mg/dL    Bilirubin Total 0.6 0.2 - 1.3 mg/dL    Albumin 3.5 3.4 - 5.0 g/dL    Protein Total 6.8 6.8 - 8.8 g/dL    Alkaline Phosphatase 80 40 - 150 U/L    ALT 21 0 - 50 U/L    AST 13 0 - 45 U/L   CRP inflammation   Result Value Ref Range    CRP Inflammation <2.9 0.0 - 8.0 mg/L   Erythrocyte sedimentation rate auto   Result Value Ref Range    Sed Rate 8 0 - 20 mm/h     *Note: Due to a large number of results and/or encounters for the requested time period, some results have not been displayed. A complete set of results can be found in Results Review.       Medications   diphenhydrAMINE (BENADRYL) capsule 25 mg (25 mg  Oral Not Given 7/16/18 2044)   0.9% sodium chloride BOLUS (1,000 mLs Intravenous New Bag 7/16/18 1919)   promethazine (PHENERGAN) IV injection 25 mg (25 mg Intravenous Given 7/16/18 1903)   HYDROcodone-acetaminophen (NORCO) 5-325 MG per tablet 1 tablet (1 tablet Oral Given 7/16/18 2033)       Assessments & Plan (with Medical Decision Making)  Katy Islas is a 28 year old female who presented to the ED complaining of hematemesis, epigastric pain, and bloody stools for the last 2 days.  On arrival to the ED she was mildly tachycardic with otherwise normal vital signs.  She exhibited minimal tenderness to the epigastric region on exam but otherwise benign findings today.  She was just seen in [place] ED yesterday evening 1 hour after symptom onset and had an unremarkable workup.  She initially denied being seen yesterday but when I brought it up she said that they did not do anything for her which is why she came here.  We did recheck her labs today which showed a normal white count, normal hemoglobin of 13.2.  CRP and ESR were negative so I think it unlikely she is having a Crohn's flare at this time.  CMP unremarkable.  She was given IV fluids and Phenergan initially for symptoms.  I attempted to send a gastric occult screen however she had no episodes of emesis throughout ED stay or diarrhea.  She reported the Phenergan was not working and repeatedly requested IV Benadryl and Dilaudid.  When I went to discuss results with her she was refusing an  however I insisted because I wanted to be sure we were on the same page. Once  was reached, I explained that overall workup is reassuring and I do not think a CT scan indicated given normal lab studies.  I did offer her an abdominal x-ray to evaluate for obstructive process versus intraperitoneal free air if she truly was having hematemesis secondary to PUD or perforation though again, exam reassuring so I did not think this likely.  After long  conversation using  she was agreeable to trying oral Norco for pain control and oral Benadryl.  I also expressed during this time using the  that if her x-ray came back looking good we would get her home later today.  I explained that she would not be receiving any pain medication from the ED as symptoms likely secondary to some type of gastroenteritis- and she is already on PPI and carafate. Also noted to have normal EGD this past spring when she presented with similar symptoms.  I did offer anti-emetics for home but she stated she already had some.  When nursing staff went to administer her the norco and oral Benadryl she accepted the norco, then grew very upset saying she wanted IV benadryl because she was nauseated despite the fact she ate 5 packets of crackers while here without any emesis.  She told nursing staff that she is going to leave and refused to have x-ray completed.  We did print out discharge papers for her but she left before she received them.     I have reviewed the nursing notes.    I have reviewed the findings, diagnosis, plan and need for follow up with the patient.    Discharge Medication List as of 7/16/2018  8:58 PM          Final diagnoses:   Non-intractable vomiting with nausea, unspecified vomiting type   Epigastric pain     Note: Chart documentation done in part with Dragon Voice Recognition software. Although reviewed after completion, some word and grammatical errors may remain.    7/16/2018   [facility] EMERGENCY DEPARTMENT     Marian Barton PA-C  07/16/18 [id number]

## 2018-07-17 ENCOUNTER — TRANSFERRED RECORDS (OUTPATIENT)
Dept: HEALTH INFORMATION MANAGEMENT | Facility: CLINIC | Age: 28
End: 2018-07-17

## 2018-07-17 ENCOUNTER — TELEPHONE (OUTPATIENT)
Dept: FAMILY MEDICINE | Facility: OTHER | Age: 28
End: 2018-07-17

## 2018-07-17 NOTE — DISCHARGE INSTRUCTIONS
At this time, the cause of your symptoms is unclear but workup today is reassuring.  I encourage you to slowly advance your diet as tolerated.  Continue taking your PPI and Carafate in case this is related to gastritis or peptic ulcer disease.  Follow-up at scheduled GI appointment.  Return to the emergency department if you develop any worsening symptoms.    Thank you for choosing [facility] Emergency Department. It was a pleasure taking care of you today. If you have any questions, please call [phone].    Marian Barton PA-C        *Abdominal Pain, Unknown Cause (Female)    The exact cause of your abdominal (stomach) pain is not certain. This does not mean that this is something to worry about, or the right tests were not done. Everyone likes to know the exact cause of the problem, but sometimes with abdominal pain, there is no clear-cut cause, and this could be a good thing. The good news is that your symptoms can be treated, and you will feel better.   Your condition does not seem serious now; however, sometimes the signs of a serious problem may take more time to appear. For this reason, it is important for you to watch for any new symptoms, problems, or worsening of your condition.  Over the next few days, the abdominal pain may come and go, or be continuous. Other common symptoms can include nausea and vomiting. Sometimes it can be difficult to tell if you feel nauseous, you may just feel bad and not associate that feeling with nausea. Constipation, diarrhea, and a fever may go along with the pain.  The pain may continue even if treated correctly over the following days. Depending on how things go, sometimes the cause can become clear and may require further or different treatment. Additional evaluations, medications, or tests may be needed.  Home care  Your health care provider may prescribe medications for pain, symptoms, or an infection.  Follow the health care provider's instructions for  taking these medications.  General care    Rest until your next exam. No strenuous activities.    Try to find positions that ease discomfort. A small pillow placed on the abdomen may help relieve pain.    Something warm on your abdomen (such as a heating pad) may help, but be careful not to burn yourself.  Diet    Do not force yourself to eat, especially if having cramps, vomiting, or diarrhea.    Water is important so you do not get dehydrated. Soup may also be good. Sports drinks may also help, especially if they are not too acidic. Make sure you don't drink sugary drinks as this can make things worse. Take liquids in small amounts. Do not guzzle them.    Caffeine sometimes makes the pain and cramping worse.    Avoid dairy products if you have vomiting or diarrhea.    Don't eat large amounts at a time. Wait a few minutes between bites.    Eat a diet low in fiber (called a low-residue diet). Foods allowed include refined breads, white rice, fruit and vegetable juices without pulp, tender meats. These foods will pass more easily through the intestine.    Avoid fried or fatty foods, dairy, alcohol and spicy foods until your symptoms go away.  Follow-up care  Follow up with your health care provider as instructed, or if your pain does not begin to improve in the next 24 hours.  When to seek medical care  Seek prompt medical care if any of the following occur:    Pain gets worse or moves to the right lower abdomen    New or worsening vomiting or diarrhea    Swelling of the abdomen    Unable to pass stool for more than three days    New fever over 101  F (38.3 C), or rising fever    Blood in vomit or bowel movements (dark red or black color)    Jaundice (yellow color of eyes and skin)    Weakness, dizziness    Chest, arm, back, neck or jaw pain    Unexpected vaginal bleeding or missed period  Call 911  Call emergency services if any of the following occur:    Trouble breathing    Confusion    Fainting or loss of  consciousness    Rapid heart rate    Seizure

## 2018-07-17 NOTE — ED NOTES
Reports abdominal pain is worsing.  Informed   EMILY Fonseca, informed me to wait on pain meds until labs are back.

## 2018-07-17 NOTE — ED NOTES
"Patient left ED without ED discharge instructions, saying to registration people \"fuck you all\" and giving them the finger\".   "

## 2018-07-17 NOTE — TELEPHONE ENCOUNTER
Katy Islas is a 28 year old female who calls with abdominal pain.    NURSING ASSESSMENT:  Description:  I spoke with pt (via interpretor) who has had stomach pain for 3 days. Upper left side, stabbing pain. Vomit this morning once, no blood. No stools yet today  Onset/duration:  3 days  Precip. factors:  Chrohn's  Associated symptoms:  No fevers  Improves/worsens symptoms:  Eating and walking makes pain worse  Pain scale (0-10)   5-6/10, states it is a little more then yesterday.  Last exam/Treatment:  ED yesterday    Allergies:   Allergies   Allergen Reactions     Ativan [Lorazepam] Hives     At the IV site     Baclofen      hives     Bees Hives, Swelling and Difficulty breathing     Caffeine      Contrast Dye      Hives,   Updated 5/10/2016 CT Contrast.     Iodine Hives     Methocarbamol Swelling     Metoclopramide      Other reaction(s): Tremors  LW Reaction: shaking/sweating     Midazolam      Other reaction(s): Agitation     Monosodium Glutamate      Morphine Other (See Comments)     Difficulty with urination     Nsaids Other (See Comments)     GI BLEED x2     Other (Do Not Use) Other (See Comments)     Xanaflex- pt becomes disoriented and loses bladder control     Reglan [Metoclopramide Hcl] Other (See Comments)     shaking     Soma [Carisoprodol] Visual Disturbance     Sleep walking     Tizanidine      Topamax Other (See Comments)     Topamax [Topiramate] Nausea     Tingling  GI/Vomit     Tramadol      Severe Headache, Seizure Risk     Tylenol W/Codeine [Acetaminophen-Codeine] Nausea and Itching     Tylenol 3     Valium Other (See Comments)     Becomes aggressive and angry     Versed Other (See Comments)     Zolmitriptan      Makes face feel like its twitching     Droperidol Anxiety     Flu Virus Vaccine Rash      Arm swelling       RECOMMENDED DISPOSITION:  To ED, another person to drive  Will comply with recommendation: Yes  If further questions/concerns or if symptoms do not improve, worsen or new  symptoms develop, call your PCP or [facility] as soon as possible.      Guideline used: Abdominal pain  Telephone Triage Protocols for Nurses, Fifth Edition, Telma Jimenez RN

## 2018-07-19 LAB
COPATH REPORT: NORMAL
COPATH REPORT: NORMAL

## 2018-07-19 NOTE — PROGRESS NOTES
Savana Burns    There were some atypical but not cancerous cells on your biopsies. 2 of them were completely removed. 2 others - your left forearm and left shoulder we need to take out a bigger piece. Please schedule lesion excision with me. When I see you, we should also discuss getting you established with a dermatologist.     The results are attached for your review.       Zach Rosario PA-C

## 2018-07-23 ENCOUNTER — OFFICE VISIT (OUTPATIENT)
Dept: FAMILY MEDICINE | Facility: OTHER | Age: 28
End: 2018-07-23
Payer: MEDICARE

## 2018-07-23 VITALS
OXYGEN SATURATION: 100 % | TEMPERATURE: 98.9 F | SYSTOLIC BLOOD PRESSURE: 131 MMHG | HEART RATE: 98 BPM | BODY MASS INDEX: 31.28 KG/M2 | RESPIRATION RATE: 16 BRPM | WEIGHT: 171 LBS | DIASTOLIC BLOOD PRESSURE: 85 MMHG

## 2018-07-23 DIAGNOSIS — F41.9 ANXIETY: ICD-10-CM

## 2018-07-23 DIAGNOSIS — D22.9 ATYPICAL MOLE: Primary | ICD-10-CM

## 2018-07-23 DIAGNOSIS — L98.8 DYSPLASTIC SKIN LESION: ICD-10-CM

## 2018-07-23 DIAGNOSIS — Z98.890 HISTORY OF REMOVAL OF PIGMENTED SKIN LESION: ICD-10-CM

## 2018-07-23 DIAGNOSIS — D22.9 MELANOCYTIC NEVUS, UNSPECIFIED LOCATION: ICD-10-CM

## 2018-07-23 DIAGNOSIS — Z87.2 HISTORY OF REMOVAL OF PIGMENTED SKIN LESION: ICD-10-CM

## 2018-07-23 DIAGNOSIS — F43.23 ADJUSTMENT DISORDER WITH MIXED ANXIETY AND DEPRESSED MOOD: ICD-10-CM

## 2018-07-23 PROCEDURE — 11400 EXC TR-EXT B9+MARG 0.5 CM<: CPT | Performed by: PHYSICIAN ASSISTANT

## 2018-07-23 PROCEDURE — 99213 OFFICE O/P EST LOW 20 MIN: CPT | Mod: 25 | Performed by: PHYSICIAN ASSISTANT

## 2018-07-23 PROCEDURE — 88305 TISSUE EXAM BY PATHOLOGIST: CPT | Mod: 26 | Performed by: PHYSICIAN ASSISTANT

## 2018-07-23 PROCEDURE — 88305 TISSUE EXAM BY PATHOLOGIST: CPT | Performed by: PHYSICIAN ASSISTANT

## 2018-07-23 PROCEDURE — 11401 EXC TR-EXT B9+MARG 0.6-1 CM: CPT | Performed by: PHYSICIAN ASSISTANT

## 2018-07-23 RX ORDER — ARIPIPRAZOLE 15 MG/1
15 TABLET ORAL AT BEDTIME
Qty: 90 TABLET | Refills: 3 | Status: SHIPPED | OUTPATIENT
Start: 2018-07-23 | End: 2020-06-01

## 2018-07-23 NOTE — MR AVS SNAPSHOT
After Visit Summary   7/23/2018    Katy Islas    MRN: [id number]           Patient Information     Date Of Birth          1990        Visit Information        Provider Department      7/23/2018 4:00 PM Aura Rosario PA-C; [facility]        Today's Diagnoses     Atypical mole    -  1    History of removal of pigmented skin lesion        Melanocytic nevus, unspecified location        Dysplastic skin lesion        Adjustment disorder with mixed anxiety and depressed mood        Anxiety          Care Instructions      - Recommend dermatology referral for continued skin monitoring      Check with insurance to make sure covered     - Suture removal in 10-14 days      Wound Care Instructions    1.  Keep area dry today.    2.  Starting tomorrow wash gently with soap and water once daily.      3.  Apply Vaseline or antibiotic ointment to the area and cover with a bandage if desired.  Do not let it dry out and form a scab as this will make the resulting scar more noticeable.    4. Protect the area from sun for up to one year afterward as the scar is continuing to remodel.  Sun exposure will also make the resulting scar more noticeable.    5.  Call if the area is very red, tender, has a discharge or is very itchy while healing, or if you have any other questions.  These may be signs of early infection or allergy.                       Wound Closure and Wound Care  What is wound closure?   Wounds heal more quickly and with less risk of infection and scarring when the wound is cleaned and the wound edges are held together (closed). Scrapes, scratches, puncture wounds, and shallow cuts may need only cleaning, ointment, and a bandage. Some cuts may need to be closed with tape strips called Steri-Strips or tissue adhesive liquid (skin glue). If a cut or surgical incision is deep, very long, jagged, or under a lot of tension (such as a cut over a joint), stitches  (also called sutures) or staples may be needed to close the wound.   How do I take care of my wound and sutures?   If you get an accidental cut, put pressure on the wound with a gauze pad or clean cloth right away to stop the bleeding. Then gently but thoroughly wash it with soap and cool water. Soapy water can be used around, but not in the cut. Try to remove all dirt and debris but do not scrub vigorously. If you decide to get medical treatment, cover the wound and apply pressure as needed to control bleeding while traveling to your healthcare provider's office, urgent care clinic, or emergency room.   After a wound is closed by your healthcare provider, the wound and the area around it must be kept clean and dry. The care of a stapled wound is similar to the care of a sutured wound. There are minor differences in caring for a wound closed with skin glue.   Do not let a wound closed with stitches or staples get wet for the first 24 hours. After 24 hours, you can shower or you can clean the wound with hydrogen peroxide or gently wash it with soap and warm water twice a day.   If your wound was closed with skin glue, keep the wound dry for the first 4 hours after the skin glue was put on. After the first 4 hours, you may occasionally and briefly wet the wound in the shower. You can clean the wound with hydrogen peroxide or gently wash it with soap and water twice a day.   If your wound is closed with Steri-Strips, they may be more likely to separate if they get wet. Keep them dry for the first few days while you're in the shower or bath.   Do not soak or scrub the wound. Do not take a bath, go swimming, or use a hot tub.   If recommended by your healthcare provider, you may put a small amount of antibiotic ointment on the wound each time you clean it. This can prevent infection. It will also help keep bandages from sticking to the wound. If a rash appears, stop using the ointment. If your wound is closed with skin  "glue, do not put liquid, antibiotic ointment, or any other product on the wound while the adhesive is in place. It may loosen the film before the wound is healed.   Make sure the wound is kept dry between washings. After showering or bathing, gently pat the wound dry with a soft towel.   Your healthcare provider may recommend that you cover the wound with gauze or a new, bandage to keep it from getting dirty. Be sure to keep the bandage dry. Put on a new bandage after cleaning the wound of if the old one gets dirty or wet.   Your healthcare provider may recommend leaving the wound \"open to air\" and not covered by a bandage while you sleep to help speed up the healing process. If the wound was closed with skin glue, you do not need a bandage.   For the first 1 or 2 days keep the area propped up higher than your heart. This will help lessen your pain and any swelling.   Protect the wound from repeat injury until the skin has had time to heal.   Protect the wound from a lot of exposure to sunlight or tanning lamps while skin glue is in place. Wounds exposed to the sun can become red, while scars that have not been exposed to the sun usually turn white after a period of time.   Do not scratch, rub, or pick at your stitches, staples, or skin glue. This may cause them to loosen before the wound is healed.   Avoid activities that will make you sweat a lot until the skin glue has naturally fallen off or the stitches or staples have been removed.   Any wound can become infected. When you are cleaning your wound, look for these signs of infection:   increased redness   red streaks   increased swelling   increased pain or tenderness   pus or other drainage   warmth in the area of the wound   fever.   Contact your provider if you see any signs of infection.   If your wound was accidental, be sure to ask if a tetanus booster is needed. Treatment of accidental wounds may include taking an oral antibiotic to help prevent " infection. Be sure to take the medicine until it is completely gone. Do not stop taking it just because the wound looks like it is healing well.   When are stitches, staples, or other types of wound closures removed?   Steri-Strips are usually left on until they fall off. If they have not fallen off after 2 weeks, they should be removed. Skin glue usually falls off on its own in 5 to 10 days.   For deep cuts the first stitches are placed under the skin. These stitches are made of materials that dissolve and do not need to be removed. Sutures or staples on the surface of the skin need to be removed by your healthcare provider 5 to 14 days after they are put in. The length of time depends on where the cut is. Sutures in wounds on the face usually can be removed after 5 to 7 days. In areas of high stress, such as hands, knees, or elbows, the sutures must stay in 10 to 14 days. Your provider will tell you when you should come to the office for removal of your sutures or staples. Do NOT remove sutures or staples yourself unless your provider instructs you to do so. Staples are removed using a special tool. If you don't have the tool, don't try to remove the staples.   When should I call my healthcare provider?   Some swelling, redness, and pain are common with all wounds and normally go away as the wound heals.   Call your provider right away if:   You start to have any signs or symptoms of infection. These include:   Your skin is redder or more painful.   You have red streaks from the wound going toward your heart.   The wound area is very warm to touch.   You have pus or other fluid coming from the wound area.   You have a fever higher than 101.5? F (38.6? C).   You have chills, nausea, vomiting, or muscle aches.   The wound seems to be opening up or you notice any drainage.   The wound bleeds for more than 10 minutes.   The stitches or staples are loose.   The skin glue is loosening before it is supposed to.   You  have any symptoms that worry you.     Published by [employer].  This content is reviewed periodically and is subject to change as new health information becomes available. The information is intended to inform and educate and is not a replacement for medical evaluation, advice, diagnosis or treatment by a healthcare professional.   Written by Quirino Mcnally MD.   ? 2010 [facility] and/or its affiliates. All Rights Reserved.   Copyright   [employer] 2011                    Follow-ups after your visit        Additional Services     DERMATOLOGY REFERRAL       Your provider has referred you to: PREFERRED PROVIDERS:  OTHER PROVIDERS:  [facility] [place] [facility] [phone]   [email or web address]    Please be aware that coverage of these services is subject to the terms and limitations of your health insurance plan.  Call member services at your health plan with any benefit or coverage questions.      Please bring the following with you to your appointment:    (1) Any X-Rays, CTs or MRIs which have been performed.  Contact the facility where they were done to arrange for  prior to your scheduled appointment.    (2) List of current medications  (3) This referral request   (4) Any documents/labs given to you for this referral                  Follow-up notes from your care team     Return if symptoms worsen or fail to improve.      Who to contact     If you have questions or need follow up information about today's clinic visit or your schedule please [facility] at [phone].  Normal or non-critical lab and imaging results will be communicated to you by MyChart, letter or phone within 4 business days after the clinic has received the results. If you do not hear from us within 7 days, please contact the clinic through MyChart or phone. If you have a critical or abnormal lab result, we will notify you by phone as soon as  possible.  Submit refill requests through [employer] or call your pharmacy and they will forward the refill request to us. Please allow 3 business days for your refill to be completed.          Additional Information About Your Visit        [employer] Information     [employer] gives you secure access to your electronic health record. If you see a primary care provider, you can also send messages to your care team and make appointments. If you have questions, please call your primary care clinic.  If you do not have a primary care provider, please call [phone] and they will assist you.        Care EveryWhere ID     This is your Care EveryWhere ID. This could be used by other organizations to access your [facility] medical records  [id number]        Your Vitals Were     Pulse Temperature Respirations Last Period Pulse Oximetry BMI (Body Mass Index)    98 98.9  F (37.2  C) (Oral) 16 07/07/2018 100% 31.28 kg/m2       Blood Pressure from Last 3 Encounters:   07/23/18 131/85   07/16/18 101/61   07/06/18 (!) 135/94    Weight from Last 3 Encounters:   07/23/18 171 lb (77.6 kg)   07/16/18 160 lb (72.6 kg)   07/06/18 171 lb (77.6 kg)              We Performed the Following     BIOPSY SKIN/SUBQ/MUC MEM, EACH ADDTL LESION     BIOPSY SKIN/SUBQ/MUC MEM, SINGLE LESION     DERMATOLOGY REFERRAL     Surgical pathology exam          Where to get your medicines      These medications were sent to [facility] [id number] [place] [address] AT [address]  [address] [facility] [address]     Phone:  [phone]     ARIPiprazole 15 MG tablet          Primary Care Provider Office Phone # Fax #    Aura Rosario PA-C [phone]       [address] [address]        Equal Access to Services     VIKY VILLANUEVA : Ruth Chen, waaxda luqadaha, qaybta kaalmaduarte ragland, susan santo . So [facility]  [phone].    ATENCIÓN: Si maggi nathan, tiene a kerr disposición servicios gratuitos de asistencia lingüística. Casper craig [phone].    We comply with applicable federal civil rights laws and [place] laws. We do not discriminate on the basis of race, color, national origin, age, disability, sex, sexual orientation, or gender identity.            Thank you!     Thank you for choosing [facility]  for your care. Our goal is always to provide you with excellent care. Hearing back from our patients is one way we can continue to improve our services. Please take a few minutes to complete the written survey that you may receive in the mail after your visit with us. Thank you!             Your Updated Medication List - Protect others around you: Learn how to safely use, store and throw away your medicines at www.disposemymeds.org.          This list is accurate as of 7/23/18  4:49 PM.  Always use your most recent med list.                   Brand Name Dispense Instructions for use Diagnosis    * albuterol 108 (90 Base) MCG/ACT Inhaler    PROAIR HFA/PROVENTIL HFA/VENTOLIN HFA    3 Inhaler    Inhale 2 puffs into the lungs every 6 hours as needed for shortness of breath / dyspnea or wheezing    Mild persistent asthma without complication       * albuterol (2.5 MG/3ML) 0.083% neb solution     50 vial    Take 1 vial (2.5 mg) by nebulization every 6 hours as needed for shortness of breath / dyspnea or wheezing    Mild persistent asthma without complication       ARIPiprazole 15 MG tablet    ABILIFY    90 tablet    Take 1 tablet (15 mg) by mouth At Bedtime    Adjustment disorder with mixed anxiety and depressed mood, Anxiety       azithromycin 250 MG tablet    ZITHROMAX    6 tablet    Two tablets first day, then one tablet daily for four days.    Acute bronchitis with coexisting condition requiring prophylactic treatment       cetirizine 10 MG tablet    zyrTEC    90 tablet    Take 1 tablet (10 mg) by mouth  daily    Mild persistent asthma without complication       cyclobenzaprine 10 MG tablet    FLEXERIL    60 tablet    Take 1 tablet (10 mg) by mouth 2 times daily as needed for muscle spasms or other (back pain)    Chronic bilateral low back pain with left-sided sciatica       dexlansoprazole 60 MG Cpdr CR capsule    DEXILANT    90 capsule    Take 1 capsule (60 mg) by mouth daily    Gastroesophageal reflux disease without esophagitis       diazepam 10 MG tablet    VALIUM    60 tablet    Take 1 tablet (10 mg) by mouth every 12 hours as needed for anxiety or sleep (MUSCLE SPASM) (one month supply)    Chronic pain syndrome, Chronic bilateral low back pain with left-sided sciatica, Lumbar disc disease with radiculopathy, S/P lumbar fusion       dicyclomine 20 MG tablet    BENTYL    60 tablet    Take 1 tablet (20 mg) by mouth 4 times daily as needed (ABDOMINAL CRAMPING) AS NEEDED FOR CRAMPING    Hx of Crohn's disease       DULoxetine 60 MG EC capsule    CYMBALTA    90 capsule    Take 1 capsule (60 mg) by mouth daily    Adjustment disorder with mixed anxiety and depressed mood       EPINEPHrine 0.3 MG/0.3ML injection 2-pack    EPIPEN/ADRENACLICK/or ANY BX GENERIC EQUIV    0.6 mL    Inject 0.3 mLs (0.3 mg) into the muscle once as needed for anaphylaxis    Bee sting allergy       gabapentin 300 MG capsule    NEURONTIN    120 capsule    Take 1-3 capsules (300-900 mg) by mouth 3 times daily    Chronic bilateral low back pain with left-sided sciatica       guaiFENesin-codeine 100-10 MG/5ML Soln solution    ROBITUSSIN AC    120 mL    Take 10 mLs by mouth nightly as needed for cough    Acute bronchitis with coexisting condition requiring prophylactic treatment       meclizine 12.5 MG tablet    ANTIVERT    30 tablet    Take 1 tablet (12.5 mg) by mouth 4 times daily as needed for dizziness    Dizziness       medical cannabis inhalation (Patient's own supply.  Not a prescription)      Inhale into the lungs 3 times daily as needed  Reported on 3/28/2017        naproxen 500 MG tablet    NAPROSYN    60 tablet    Take 1 tablet (500 mg) by mouth 2 times daily (with meals)    Chronic bilateral low back pain with left-sided sciatica       NONFORMULARY      Apply 30 mLs topically 4 times daily        pantoprazole 40 MG EC tablet    PROTONIX    30 tablet    Take 1 tablet (40 mg) by mouth daily Take 30-60 minutes before a meal.    Gastroesophageal reflux disease without esophagitis       promethazine 6.25 MG/5ML syrup    PHENERGAN    560 mL    Take 10 mLs (12.5 mg) by mouth 4 times daily as needed for nausea    Nausea       rizatriptan 5 MG ODT tab    MAXALT-MLT    18 tablet    Take 1-2 tablets (5-10 mg) by mouth at onset of headache for migraine May repeat in 2 hours. Max 6 tablets/24 hours.    Migraine with aura and without status migrainosus, not intractable       sucralfate 1 GM tablet    CARAFATE    60 tablet    Take 1 tablet (1 g) by mouth 4 times daily as needed    Gastroesophageal reflux disease without esophagitis       * Notice:  This list has 2 medication(s) that are the same as other medications prescribed for you. Read the directions carefully, and ask your doctor or other care provider to review them with you.

## 2018-07-23 NOTE — PROGRESS NOTES
SUBJECTIVE:   Katy Islas is a 28 year old female who presents to clinic today for the following health issues:    HPI  Patient here for skin lesion removal after biopsy   FINAL DIAGNOSIS:   A. Skin, left forearm:   - Intradermal melanocytic nevus     B. Skin, left shoulder:   - Compound dysplastic nevus with mild atypia.     C. Skin, right elbow:   - Intradermal melanocytic nevus     D. Skin, right flank:   - Compound dysplastic nevus with mild atypia.     - Recommend removal of entire lesion of A & B due to extension to lateral margin     Depression and Anxiety Follow-Up    Status since last visit: Improved     Other associated symptoms:None    Complicating factors:     Significant life event: Yes-  Home life     Current substance abuse: Cannabis    - Abilify going well   - Has appointment with psychiatry 8/6 and they are going to help find her a counselor that signs ASL    - Sees her new GI specialist tomorrow       PHQ-9 3/20/2018 3/26/2018 6/19/2018   Total Score 1 7 6   Q9: Suicide Ideation Not at all Not at all Not at all     JULIETH-7 SCORE 3/20/2018 3/26/2018 6/19/2018   Total Score - - -   Total Score 3 (minimal anxiety) - 21 (severe anxiety)   Total Score 3 7 21     In the past two weeks have you had thoughts of suicide or self-harm?  No.    Do you have concerns about your personal safety or the safety of others?   No           Problem list and histories reviewed & adjusted, as indicated.  Additional history: as documented    Labs reviewed in EPIC    ROS:  Constitutional, HEENT, cardiovascular, pulmonary, gi and gu systems are negative, except as otherwise noted.    OBJECTIVE:   /85  Pulse 98  Temp 98.9  F (37.2  C) (Oral)  Resp 16  Wt 171 lb (77.6 kg)  LMP 07/07/2018  SpO2 100%  BMI 31.28 kg/m2  Body mass index is 31.28 kg/(m^2).  GENERAL APPEARANCE: healthy, alert and no distress  EYES: Eyes grossly normal to inspection, PERRLA, conjunctivae and sclerae without injection or discharge,  EOM intact   MS: No musculoskeletal defects are noted and gait is age appropriate without ataxia   SKIN:   Left forearm - well healing skin lesion, edges of scar ~ 6 mm long   Left shoulder - well healing skin lesion, edges of ~5 mm long   No other suspicious lesions or rashes, hydration status appears adeuqate with normal skin turgor   PSYCH: Alert and oriented x3; speech- coherent , normal rate and volume; able to articulate logical thoughts, able to abstract reason, no tangential thoughts, no hallucinations or delusions, mentation appears normal, Mood is euthymic. Affect is appropriate for this mood state and bright. Thought content is free of suicidal ideation, hallucinations, and delusions. Dress is adequate and upkept. Eye contact is good during conversation.       Diagnostic Test Results:  Surg Path - pending       Procedure Note:  Pause for the cause has been completed prior to the prceedure.   2. Patient was identified by both name and date of birth   3. The correct site was identified   4. Site was marked by provider    5. Written informed consent correct and signed or verbal authorization  to proceed was obtained   6. Verifed necessary supplies, equipment, and diagnostics are available    7. Time out was performed immediately prior to procedure    Objective: The lesion(s) is/are of the above mentioned size and location and is/are healing well.     A) Left forearm - The area was prepped using alcohol swabs and appropriately anesthetized using 1 cc of 1% lidocaine with epi. Using the usual technique, punch biopsy size 6 mm was performed. Hemostasis was obtained using 2 single interrupted sutures of 4-0 Ethilon. An appropriate dressing was applied. The procedure was well tolerated and without complications.    B) Left shoulder - The area was prepped using alcohol swabs and appropriately anesthetized using 1 cc of 1% lidocaine with epi. Using the usual technique, punch biopsy size 6 mm was performed.  Hemostasis was obtained using 2 single interrupted sutures of 4-0 Ethilon. An appropriate dressing was applied. The procedure was well tolerated and without complications.        ASSESSMENT/PLAN:       ICD-10-CM    1. Atypical mole D22.9 DERMATOLOGY REFERRAL     BIOPSY SKIN/SUBQ/MUC MEM, SINGLE LESION     BIOPSY SKIN/SUBQ/MUC MEM, EACH ADDTL LESION     Surgical pathology exam   2. History of removal of pigmented skin lesion Z98.890 DERMATOLOGY REFERRAL    Z87.2 BIOPSY SKIN/SUBQ/MUC MEM, SINGLE LESION     BIOPSY SKIN/SUBQ/MUC MEM, EACH ADDTL LESION     Surgical pathology exam   3. Melanocytic nevus, unspecified location D22.9 DERMATOLOGY REFERRAL     BIOPSY SKIN/SUBQ/MUC MEM, SINGLE LESION     BIOPSY SKIN/SUBQ/MUC MEM, EACH ADDTL LESION     Surgical pathology exam   4. Dysplastic skin lesion L98.8 DERMATOLOGY REFERRAL     BIOPSY SKIN/SUBQ/MUC MEM, SINGLE LESION     BIOPSY SKIN/SUBQ/MUC MEM, EACH ADDTL LESION     Surgical pathology exam   5. Adjustment disorder with mixed anxiety and depressed mood F43.23 ARIPiprazole (ABILIFY) 15 MG tablet   6. Anxiety F41.9 ARIPiprazole (ABILIFY) 15 MG tablet     1-4.   - Discussed atypica on results and need for re-excision   - Procedure as above was well tolerated   - Await pathology results   - Suture removal in 10-14 days   - Wound care discussed at length and hand out given   - Patient has now had several moles with atypia or dysplasia, however this was first time we had to re-excise   - Due to this and family history of melanoma, along with many moles, recommend patient establish with dermatology and get yearly skin checks   - Referral placed    5 & 6. Mood   - Doing better with Abilify 15 mg and Cymbalta   - Has appointment to establish care with [facility] in [place] ~8/6/18 and they are going to get her a counselor that speaks ASL   - Refilled medications as needed     The patient indicates understanding of these issues and agrees with the plan.    Follow up: 10-14 days for  suture removal (2 in each, 4 total)     Due to language barrier, an  was present during the history-taking and subsequent discussion (and for part of the physical exam) with this patient.        Aura oRsario PA-C  [facility]

## 2018-07-23 NOTE — PATIENT INSTRUCTIONS
- Recommend dermatology referral for continued skin monitoring      Check with insurance to make sure covered     - Suture removal in 10-14 days      Wound Care Instructions    1.  Keep area dry today.    2.  Starting tomorrow wash gently with soap and water once daily.      3.  Apply Vaseline or antibiotic ointment to the area and cover with a bandage if desired.  Do not let it dry out and form a scab as this will make the resulting scar more noticeable.    4. Protect the area from sun for up to one year afterward as the scar is continuing to remodel.  Sun exposure will also make the resulting scar more noticeable.    5.  Call if the area is very red, tender, has a discharge or is very itchy while healing, or if you have any other questions.  These may be signs of early infection or allergy.                       Wound Closure and Wound Care  What is wound closure?   Wounds heal more quickly and with less risk of infection and scarring when the wound is cleaned and the wound edges are held together (closed). Scrapes, scratches, puncture wounds, and shallow cuts may need only cleaning, ointment, and a bandage. Some cuts may need to be closed with tape strips called Steri-Strips or tissue adhesive liquid (skin glue). If a cut or surgical incision is deep, very long, jagged, or under a lot of tension (such as a cut over a joint), stitches (also called sutures) or staples may be needed to close the wound.   How do I take care of my wound and sutures?   If you get an accidental cut, put pressure on the wound with a gauze pad or clean cloth right away to stop the bleeding. Then gently but thoroughly wash it with soap and cool water. Soapy water can be used around, but not in the cut. Try to remove all dirt and debris but do not scrub vigorously. If you decide to get medical treatment, cover the wound and apply pressure as needed to control bleeding while traveling to your healthcare provider's office, urgent care  clinic, or emergency room.   After a wound is closed by your healthcare provider, the wound and the area around it must be kept clean and dry. The care of a stapled wound is similar to the care of a sutured wound. There are minor differences in caring for a wound closed with skin glue.   Do not let a wound closed with stitches or staples get wet for the first 24 hours. After 24 hours, you can shower or you can clean the wound with hydrogen peroxide or gently wash it with soap and warm water twice a day.   If your wound was closed with skin glue, keep the wound dry for the first 4 hours after the skin glue was put on. After the first 4 hours, you may occasionally and briefly wet the wound in the shower. You can clean the wound with hydrogen peroxide or gently wash it with soap and water twice a day.   If your wound is closed with Steri-Strips, they may be more likely to separate if they get wet. Keep them dry for the first few days while you're in the shower or bath.   Do not soak or scrub the wound. Do not take a bath, go swimming, or use a hot tub.   If recommended by your healthcare provider, you may put a small amount of antibiotic ointment on the wound each time you clean it. This can prevent infection. It will also help keep bandages from sticking to the wound. If a rash appears, stop using the ointment. If your wound is closed with skin glue, do not put liquid, antibiotic ointment, or any other product on the wound while the adhesive is in place. It may loosen the film before the wound is healed.   Make sure the wound is kept dry between washings. After showering or bathing, gently pat the wound dry with a soft towel.   Your healthcare provider may recommend that you cover the wound with gauze or a new, bandage to keep it from getting dirty. Be sure to keep the bandage dry. Put on a new bandage after cleaning the wound of if the old one gets dirty or wet.   Your healthcare provider may recommend leaving the  "wound \"open to air\" and not covered by a bandage while you sleep to help speed up the healing process. If the wound was closed with skin glue, you do not need a bandage.   For the first 1 or 2 days keep the area propped up higher than your heart. This will help lessen your pain and any swelling.   Protect the wound from repeat injury until the skin has had time to heal.   Protect the wound from a lot of exposure to sunlight or tanning lamps while skin glue is in place. Wounds exposed to the sun can become red, while scars that have not been exposed to the sun usually turn white after a period of time.   Do not scratch, rub, or pick at your stitches, staples, or skin glue. This may cause them to loosen before the wound is healed.   Avoid activities that will make you sweat a lot until the skin glue has naturally fallen off or the stitches or staples have been removed.   Any wound can become infected. When you are cleaning your wound, look for these signs of infection:   increased redness   red streaks   increased swelling   increased pain or tenderness   pus or other drainage   warmth in the area of the wound   fever.   Contact your provider if you see any signs of infection.   If your wound was accidental, be sure to ask if a tetanus booster is needed. Treatment of accidental wounds may include taking an oral antibiotic to help prevent infection. Be sure to take the medicine until it is completely gone. Do not stop taking it just because the wound looks like it is healing well.   When are stitches, staples, or other types of wound closures removed?   Steri-Strips are usually left on until they fall off. If they have not fallen off after 2 weeks, they should be removed. Skin glue usually falls off on its own in 5 to 10 days.   For deep cuts the first stitches are placed under the skin. These stitches are made of materials that dissolve and do not need to be removed. Sutures or staples on the surface of the skin need " to be removed by your healthcare provider 5 to 14 days after they are put in. The length of time depends on where the cut is. Sutures in wounds on the face usually can be removed after 5 to 7 days. In areas of high stress, such as hands, knees, or elbows, the sutures must stay in 10 to 14 days. Your provider will tell you when you should come to the office for removal of your sutures or staples. Do NOT remove sutures or staples yourself unless your provider instructs you to do so. Staples are removed using a special tool. If you don't have the tool, don't try to remove the staples.   When should I call my healthcare provider?   Some swelling, redness, and pain are common with all wounds and normally go away as the wound heals.   Call your provider right away if:   You start to have any signs or symptoms of infection. These include:   Your skin is redder or more painful.   You have red streaks from the wound going toward your heart.   The wound area is very warm to touch.   You have pus or other fluid coming from the wound area.   You have a fever higher than 101.5? F (38.6? C).   You have chills, nausea, vomiting, or muscle aches.   The wound seems to be opening up or you notice any drainage.   The wound bleeds for more than 10 minutes.   The stitches or staples are loose.   The skin glue is loosening before it is supposed to.   You have any symptoms that worry you.     Published by [employer].  This content is reviewed periodically and is subject to change as new health information becomes available. The information is intended to inform and educate and is not a replacement for medical evaluation, advice, diagnosis or treatment by a healthcare professional.   Written by Quirino Mcnally MD.   ? 2010 [employer] and/or its affiliates. All Rights Reserved.   Copyright   [employer] 2011

## 2018-07-23 NOTE — LETTER
My Asthma Action Plan  Name: Katy Islas   YOB: 1990  Date: 7/23/2018   My doctor: Aura Rosario PA-C   My clinic: [facility]        My Control Medicine: None  My Rescue Medicine: Albuterol (Proair/Ventolin/Proventil) inhaler PRN   My Asthma Severity: intermittent  Avoid your asthma triggers: smoke, upper respiratory infections and pollens  smoke  upper respiratory infections  pollens            GREEN ZONE   Good Control    I feel good    No cough or wheeze    Can work, sleep and play without asthma symptoms       Take your asthma control medicine every day.     1. If exercise triggers your asthma, take your rescue medication    15 minutes before exercise or sports, and    During exercise if you have asthma symptoms  2. Spacer to use with inhaler: If you have a spacer, make sure to use it with your inhaler             YELLOW ZONE Getting Worse  I have ANY of these:    I do not feel good    Cough or wheeze    Chest feels tight    Wake up at night   1. Keep taking your Green Zone medications  2. Start taking your rescue medicine:    every 20 minutes for up to 1 hour. Then every 4 hours for 24-48 hours.  3. If you stay in the Yellow Zone for more than 12-24 hours, contact your doctor.  4. If you do not return to the Green Zone in 12-24 hours or you get worse, start taking your oral steroid medicine if prescribed by your provider.           RED ZONE Medical Alert - Get Help  I have ANY of these:    I feel awful    Medicine is not helping    Breathing getting harder    Trouble walking or talking    Nose opens wide to breathe       1. Take your rescue medicine NOW  2. If your provider has prescribed an oral steroid medicine, start taking it NOW  3. Call your doctor NOW  4. If you are still in the Red Zone after 20 minutes and you have not reached your doctor:    Take your rescue medicine again and    Call 911 or go to the emergency room right away    See your regular  doctor within 2 weeks of an Emergency Room or Urgent Care visit for follow-up treatment.          Annual Reminders:  Meet with Asthma Educator,  Flu Shot in the Fall, consider Pneumonia Vaccination for patients with asthma (aged 19 and older).    Pharmacy:    [id number] - [facility], [place] - [address] [place] [address] [place] [address] [facility]  [facility] [place] - [address]                      Asthma Triggers  How To Control Things That Make Your Asthma Worse    Triggers are things that make your asthma worse.  Look at the list below to help you find your triggers and what you can do about them.  You can help prevent asthma flare-ups by staying away from your triggers.      Trigger                                                          What you can do   Cigarette Smoke  Tobacco smoke can make asthma worse. Do not allow smoking in your home, car or around you.  Be sure no one smokes at a child s day care or school.  If you smoke, ask your health care provider for ways to help you quit.  Ask family members to quit too.  Ask your health care provider for a referral to Quit Plan to help you quit smoking, or call 4-399-814-PLAN.     Colds, Flu, Bronchitis  These are common triggers of asthma. Wash your hands often.  Don t touch your eyes, nose or mouth.  Get a flu shot every year.     Dust Mites  These are tiny bugs that live in cloth or carpet. They are too small to see. Wash sheets and blankets in hot water every week.   Encase pillows and mattress in dust mite proof covers.  Avoid having carpet if you can. If you have carpet, vacuum weekly.   Use a dust mask and HEPA vacuum.   Pollen and Outdoor Mold  Some people are allergic to trees, grass, or weed pollen, or molds. Try to keep your windows closed.  Limit time out doors when pollen count is high.   Ask you health  care provider about taking medicine during allergy season.     Animal Dander  Some people are allergic to skin flakes, urine or saliva from pets with fur or feathers. Keep pets with fur or feathers out of your home.    If you can t keep the pet outdoors, then keep the pet out of your bedroom.  Keep the bedroom door closed.  Keep pets off cloth furniture and away from stuffed toys.     Mice, Rats, and Cockroaches  Some people are allergic to the waste from these pests.   Cover food and garbage.  Clean up spills and food crumbs.  Store grease in the refrigerator.   Keep food out of the bedroom.   Indoor Mold  This can be a trigger if your home has high moisture. Fix leaking faucets, pipes, or other sources of water.   Clean moldy surfaces.  Dehumidify basement if it is damp and smelly.   Smoke, Strong Odors, and Sprays  These can reduce air quality. Stay away from strong odors and sprays, such as perfume, powder, hair spray, paints, smoke incense, paint, cleaning products, candles and new carpet.   Exercise or Sports  Some people with asthma have this trigger. Be active!  Ask your doctor about taking medicine before sports or exercise to prevent symptoms.    Warm up for 5-10 minutes before and after sports or exercise.     Other Triggers of Asthma  Cold air:  Cover your nose and mouth with a scarf.  Sometimes laughing or crying can be a trigger.  Some medicines and food can trigger asthma.

## 2018-07-24 ENCOUNTER — TRANSFERRED RECORDS (OUTPATIENT)
Dept: HEALTH INFORMATION MANAGEMENT | Facility: CLINIC | Age: 28
End: 2018-07-24

## 2018-07-27 LAB — COPATH REPORT: NORMAL

## 2018-07-28 ENCOUNTER — MYC MEDICAL ADVICE (OUTPATIENT)
Dept: FAMILY MEDICINE | Facility: OTHER | Age: 28
End: 2018-07-28

## 2018-07-30 NOTE — PROGRESS NOTES
Savana Burns    Your results were normal and show we got the whole skin lesion, so no further follow up needed.     The results are attached for your review.       Zach Rosario PA-C

## 2018-07-31 ENCOUNTER — TELEPHONE (OUTPATIENT)
Dept: FAMILY MEDICINE | Facility: OTHER | Age: 28
End: 2018-07-31

## 2018-07-31 ENCOUNTER — OFFICE VISIT (OUTPATIENT)
Dept: FAMILY MEDICINE | Facility: OTHER | Age: 28
End: 2018-07-31
Payer: MEDICARE

## 2018-07-31 VITALS
OXYGEN SATURATION: 100 % | HEART RATE: 130 BPM | DIASTOLIC BLOOD PRESSURE: 70 MMHG | SYSTOLIC BLOOD PRESSURE: 128 MMHG | BODY MASS INDEX: 31.31 KG/M2 | TEMPERATURE: 98.1 F | WEIGHT: 171.2 LBS

## 2018-07-31 DIAGNOSIS — B37.2 CANDIDIASIS OF SKIN: Primary | ICD-10-CM

## 2018-07-31 PROCEDURE — 99213 OFFICE O/P EST LOW 20 MIN: CPT | Mod: 24 | Performed by: PHYSICIAN ASSISTANT

## 2018-07-31 RX ORDER — NYSTATIN 100000 U/G
CREAM TOPICAL 3 TIMES DAILY
Qty: 30 G | Refills: 1 | Status: SHIPPED | OUTPATIENT
Start: 2018-07-31 | End: 2019-02-22

## 2018-07-31 RX ORDER — OXYCODONE AND ACETAMINOPHEN 5; 325 MG/1; MG/1
TABLET ORAL
COMMUNITY
End: 2018-08-21

## 2018-07-31 ASSESSMENT — PAIN SCALES - GENERAL: PAINLEVEL: SEVERE PAIN (7)

## 2018-07-31 NOTE — TELEPHONE ENCOUNTER
Reason for Call:  Same Day Appointment, Requested Provider:  ZAYNAB Daley     PCP: Aura Rosario    Reason for visit: rash on skin that is starting to crack under left breast    Duration of symptoms: a few weeks now but worse this week    Have you been treated for this in the past? No    Additional comments: is wondering if CD[facility] would work her in today. Declined another provider    Can we leave a detailed message on this number? YES    Phone number patient can be reached at: Home number on file [phone] (home)     Best Time: any    Call taken on 7/31/2018 at 8:03 AM by Kenzie Maria

## 2018-07-31 NOTE — PROGRESS NOTES
SUBJECTIVE:   Katy Islas is a 28 year old female who presents to clinic today for the following health issues:    HPI  Rash  Onset: a few weeks ago    Description:   Location: under left breasts  Character: painful, burning  Itching (Pruritis): YES    Progression of Symptoms:  worsening    Accompanying Signs & Symptoms:  Fever: no   Body aches or joint pain: no   Sore throat symptoms: no   Recent cold symptoms: no     History:   Previous similar rash: no, 2010 had a similar    Precipitating factors:   Exposure to similar rash: no   New exposures: None   Recent travel: no     Alleviating factors:  Keeping it dry and baby powder        Patient call from today  (see media tab for pictures)   Reason for Call:  Same Day Appointment, Requested Provider:  ZAYNAB Daley   PCP: Aura Rosario  Reason for visit: rash on skin that is starting to crack under left breast  Duration of symptoms: a few weeks now but worse this week  Have you been treated for this in the past? 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 Declined another provider        Problem list and histories reviewed & adjusted, as indicated.  Additional history: as documented    ROS:  Constitutional, HEENT, cardiovascular, pulmonary, gi and gu systems are negative, except as otherwise noted.    OBJECTIVE:   /70  Pulse 130  Temp 98.1  F (36.7  C) (Temporal)  Wt 171 lb 3.2 oz (77.7 kg)  LMP 07/11/2018 (Exact Date)  SpO2 100%  BMI 31.31 kg/m2  Body mass index is 31.31 kg/(m^2).  GENERAL APPEARANCE: healthy, alert and no distress  EYES: Eyes grossly normal to inspection, PERRLA, conjunctivae and sclerae without injection or discharge, EOM intact   MS: No musculoskeletal defects are noted and gait is age appropriate without ataxia   SKIN:   Under bilateral breasts - erythematous long plaque like areas with moisture in center and scaling on edges, signs of excoriation, no signs of bacterial  infection or breaks in the skin   No suspicious lesions or rashes, hydration status appears adeuqate with normal skin turgor   PSYCH: Alert and oriented x3; speech- coherent , normal rate and volume; able to articulate logical thoughts, able to abstract reason, no tangential thoughts, no hallucinations or delusions, mentation appears normal, Mood is euthymic. Affect is appropriate for this mood state and bright. Thought content is free of suicidal ideation, hallucinations, and delusions. Dress is adequate and upkept. Eye contact is good during conversation.       Diagnostic Test Results:  none     ASSESSMENT/PLAN:       ICD-10-CM    1. Candidiasis of skin B37.2 nystatin (MYCOSTATIN) cream     - Discussed with patient signs of candida intertrigo, fungal infection due to moisture under breasts   - Recommend Nystatin cream until gone   - Keep area clean and dry to prevent infection   - Hand out given     - Patient had questions about her pain medications, some confusion as appears [place] [facility] has oxycodone prescribed yesterday/today (comes across in medication reconcile) but patient states she was told needs to get from primary  - Discussed I will not be getting involved in her pain medications again, needs to come from her surgeon if she requires them    The patient indicates understanding of these issues and agrees with the plan.    Follow up: PRN     Due to language barrier, an  was present during the history-taking and subsequent discussion (and for part of the physical exam) with this patient.      Aura Rosario PA-C  [facility]

## 2018-07-31 NOTE — MR AVS SNAPSHOT
After Visit Summary   7/31/2018    Katy Islas    MRN: [id number]           Patient Information     Date Of Birth          1990        Visit Information        Provider Department      7/31/2018 2:00 PM Aura Rosario PA-C; [facility]        Today's Diagnoses     Candidiasis of skin    -  1      Care Instructions      Fungal Skin Infection   A fungal infection occurs when too much fungus grows on or in the body. Fungus normally lives on the skin in small amounts and does not cause harm. But when too much grows on the skin, it causes an infection. This is also known as tinea. Fungal skin infections are common and not usually serious.  The infection often starts as a small red area the size of a pea. The skin may turn dry and flaky. The area may itch. As the fungus grows, it spreads out in a red [place]. Because of how it looks, fungal skin infection is often called ringworm, but it is not caused by a worm. Fungal skin infections can occur on many parts of the body. They can grow on the head, chest, arms, or legs. They can occur on the buttocks. On the feet, fungal infection is known as  athlete s foot.  It causes itchy, sometimes painful sores between the toes and the bottom or sides of the feet. In the groin, the rash is called  jock itch.   People with weak immune systems can get a fungal infection more easily. This includes people with diabetes or HIV, or who are being treated for cancer. In these cases, the fungal infection can spread and cause severe illness. Fungal infections are also more common in people who are overweight.  In most cases, treatment is done with antifungal cream or ointment. If the infection is on your scalp, you may take oral medicine. In some cases, a tiny piece of the skin (biopsy) may be taken. This is so it can be tested in a lab.  Common fungal infections are treated with creams on the skin or oral medicine.  Home  care  Follow all instructions when using antifungal cream or ointment on your skin. Your healthcare provider may advise using cornstarch powder to keep your skin dry or petroleum jelly to provide a barrier.  General care:    If you were prescribed an oral medicine, read the patient information. Talk with your healthcare provider about the risks and side effects.    Let your skin dry completely after bathing. Carefully dry your feet and between your toes.    Dress in loose cotton clothing.    Don t scratch the affected area. This can delay healing and may spread the infection. It can also cause a bacterial infection.    Keep your skin clean, but don t wash the skin too much. This can irritate your skin.    Keep in mind that it may take a week before the fungus starts to go away. It can take 2 to 4 weeks to fully clear. To prevent it from coming back, use the medicine until the rash is all gone.  Follow-up care  Follow up with your healthcare provider if the rash does not get better after 10 days of treatment. Also follow up if the rash spreads to other parts of your body.  When to seek medical advice  Call your healthcare provider right away if any of these occur:    Fever of 100.4 F (38 C) or higher    Redness or swelling that gets worse    Pain that gets worse    Foul-smelling fluid leaking from the skin  Date Last Reviewed: 11/1/2016 2000-2017 [employer]. [address] [place] [address], [place] [id number]. All rights reserved. This information is not intended as a substitute for professional medical care. Always follow your healthcare professional's instructions.                Follow-ups after your visit        Follow-up notes from your care team     Return if symptoms worsen or fail to improve.      Your next 10 appointments already scheduled     Aug 02, 2018  9:45 AM CDT   Office Visit with Aura Rosario PA-C   [facility] ([facility]    [address]  [facility] [id number] [phone]           Bring a current list of meds and any records pertaining to this visit. For Physicals, please bring immunization records and any forms needing to be filled out. Please arrive 10 minutes early to complete paperwork.            Aug 03, 2018  2:00 PM CDT   Nurse Only with NL RN TEAM A, MAXIMUS   [facility] ([facility]    [address]  [facility] [id number] [phone]              Who to contact     If you have questions or need follow up information about today's clinic visit or your schedule please contact [facility] directly at [phone].  Normal or non-critical lab and imaging results will be communicated to you by ShopSavvyhart, letter or phone within 4 business days after the clinic has received the results. If you do not hear from us within 7 days, please contact the clinic through [employer]omerixt or phone. If you have a critical or abnormal lab result, we will notify you by phone as soon as possible.  Submit refill requests through [employer] or call your pharmacy and they will forward the refill request to us. Please allow 3 business days for your refill to be completed.          Additional Information About Your Visit        Perfect Price Information     [employer] gives you secure access to your electronic health record. If you see a primary care provider, you can also send messages to your care team and make appointments. If you have questions, please call your primary care clinic.  If you do not have a primary care provider, please call [phone] and they will assist you.        Care EveryWhere ID     This is your Care EveryWhere ID. This could be used by other organizations to access your [place] medical records  [id number]        Your Vitals Were     Pulse Temperature Last Period Pulse Oximetry BMI (Body Mass Index)       130 98.1  F (36.7  C) (Temporal) 07/11/2018 (Exact Date) 100% 31.31 kg/m2        Blood  Pressure from Last 3 Encounters:   07/31/18 128/70   07/23/18 131/85   07/16/18 101/61    Weight from Last 3 Encounters:   07/31/18 171 lb 3.2 oz (77.7 kg)   07/23/18 171 lb (77.6 kg)   07/16/18 160 lb (72.6 kg)              Today, you had the following     No orders found for display         Today's Medication Changes          These changes are accurate as of 7/31/18  2:31 PM.  If you have any questions, ask your nurse or doctor.               Start taking these medicines.        Dose/Directions    nystatin cream   Commonly known as:  MYCOSTATIN   Used for:  Candidiasis of skin   Started by:  Aura Rosario PA-C        Apply topically 3 times daily for 14 days   Quantity:  30 g   Refills:  1            Where to get your medicines      These medications were sent to [facility] [address] [place] [address] [address]  [address] [facility] [address]     Phone:  [phone]     nystatin cream                Primary Care Provider Office Phone # Fax #    Aura Rosario PA-C [phone]       [address]  [facility] [address]        Equal Access to Services     PRATIK VILLANUEVA : Hadii pilo ku hadasho Soomaali, waaxda luqadaha, qaybta kaalmada adeegyada, waxay yoselin hayyani santo . So [facility] [phone].    ATENCIÓN: Si habla español, tiene a kerr disposición servicios gratuitos de asistencia lingüística. Llame al [phone].    We comply with applicable federal civil rights laws and [place] laws. We do not discriminate on the basis of race, color, national origin, age, disability, sex, sexual orientation, or gender identity.            Thank you!     Thank you for choosing [facility]  for your care. Our goal is always to provide you with excellent care. Hearing back from our patients is one way we can continue to improve our services. Please take a few minutes to complete  the written survey that you may receive in the mail after your visit with us. Thank you!             Your Updated Medication List - Protect others around you: Learn how to safely use, store and throw away your medicines at www.RainStoremHelpHubeds.org.          This list is accurate as of 7/31/18  2:31 PM.  Always use your most recent med list.                   Brand Name Dispense Instructions for use Diagnosis    * albuterol 108 (90 Base) MCG/ACT Inhaler    PROAIR HFA/PROVENTIL HFA/VENTOLIN HFA    3 Inhaler    Inhale 2 puffs into the lungs every 6 hours as needed for shortness of breath / dyspnea or wheezing    Mild persistent asthma without complication       * albuterol (2.5 MG/3ML) 0.083% neb solution     50 vial    Take 1 vial (2.5 mg) by nebulization every 6 hours as needed for shortness of breath / dyspnea or wheezing    Mild persistent asthma without complication       ARIPiprazole 15 MG tablet    ABILIFY    90 tablet    Take 1 tablet (15 mg) by mouth At Bedtime    Adjustment disorder with mixed anxiety and depressed mood, Anxiety       cetirizine 10 MG tablet    zyrTEC    90 tablet    Take 1 tablet (10 mg) by mouth daily    Mild persistent asthma without complication       cyclobenzaprine 10 MG tablet    FLEXERIL    60 tablet    Take 1 tablet (10 mg) by mouth 2 times daily as needed for muscle spasms or other (back pain)    Chronic bilateral low back pain with left-sided sciatica       dexlansoprazole 60 MG Cpdr CR capsule    DEXILANT    90 capsule    Take 1 capsule (60 mg) by mouth daily    Gastroesophageal reflux disease without esophagitis       diazepam 10 MG tablet    VALIUM    60 tablet    Take 1 tablet (10 mg) by mouth every 12 hours as needed for anxiety or sleep (MUSCLE SPASM) (one month supply)    Chronic pain syndrome, Chronic bilateral low back pain with left-sided sciatica, Lumbar disc disease with radiculopathy, S/P lumbar fusion       dicyclomine 20 MG tablet    BENTYL    60 tablet    Take 1 tablet  (20 mg) by mouth 4 times daily as needed (ABDOMINAL CRAMPING) AS NEEDED FOR CRAMPING    Hx of Crohn's disease       DULoxetine 60 MG EC capsule    CYMBALTA    90 capsule    Take 1 capsule (60 mg) by mouth daily    Adjustment disorder with mixed anxiety and depressed mood       EPINEPHrine 0.3 MG/0.3ML injection 2-pack    EPIPEN/ADRENACLICK/or ANY BX GENERIC EQUIV    0.6 mL    Inject 0.3 mLs (0.3 mg) into the muscle once as needed for anaphylaxis    Bee sting allergy       gabapentin 300 MG capsule    NEURONTIN    120 capsule    Take 1-3 capsules (300-900 mg) by mouth 3 times daily    Chronic bilateral low back pain with left-sided sciatica       meclizine 12.5 MG tablet    ANTIVERT    30 tablet    Take 1 tablet (12.5 mg) by mouth 4 times daily as needed for dizziness    Dizziness       medical cannabis inhalation (Patient's own supply.  Not a prescription)      Inhale into the lungs 3 times daily as needed Reported on 3/28/2017        naproxen 500 MG tablet    NAPROSYN    60 tablet    Take 1 tablet (500 mg) by mouth 2 times daily (with meals)    Chronic bilateral low back pain with left-sided sciatica       NONFORMULARY      Apply 30 mLs topically 4 times daily        nystatin cream    MYCOSTATIN    30 g    Apply topically 3 times daily for 14 days    Candidiasis of skin       pantoprazole 40 MG EC tablet    PROTONIX    30 tablet    Take 1 tablet (40 mg) by mouth daily Take 30-60 minutes before a meal.    Gastroesophageal reflux disease without esophagitis       promethazine 6.25 MG/5ML syrup    PHENERGAN    560 mL    Take 10 mLs (12.5 mg) by mouth 4 times daily as needed for nausea    Nausea       rizatriptan 5 MG ODT tab    MAXALT-MLT    18 tablet    Take 1-2 tablets (5-10 mg) by mouth at onset of headache for migraine May repeat in 2 hours. Max 6 tablets/24 hours.    Migraine with aura and without status migrainosus, not intractable       sucralfate 1 GM tablet    CARAFATE    60 tablet    Take 1 tablet (1 g) by  mouth 4 times daily as needed    Gastroesophageal reflux disease without esophagitis       * Notice:  This list has 2 medication(s) that are the same as other medications prescribed for you. Read the directions carefully, and ask your doctor or other care provider to review them with you.

## 2018-07-31 NOTE — PATIENT INSTRUCTIONS
Fungal Skin Infection   A fungal infection occurs when too much fungus grows on or in the body. Fungus normally lives on the skin in small amounts and does not cause harm. But when too much grows on the skin, it causes an infection. This is also known as tinea. Fungal skin infections are common and not usually serious.  The infection often starts as a small red area the size of a pea. The skin may turn dry and flaky. The area may itch. As the fungus grows, it spreads out in a red [place]. Because of how it looks, fungal skin infection is often called ringworm, but it is not caused by a worm. Fungal skin infections can occur on many parts of the body. They can grow on the head, chest, arms, or legs. They can occur on the buttocks. On the feet, fungal infection is known as  athlete s foot.  It causes itchy, sometimes painful sores between the toes and the bottom or sides of the feet. In the groin, the rash is called  jock itch.   People with weak immune systems can get a fungal infection more easily. This includes people with diabetes or HIV, or who are being treated for cancer. In these cases, the fungal infection can spread and cause severe illness. Fungal infections are also more common in people who are overweight.  In most cases, treatment is done with antifungal cream or ointment. If the infection is on your scalp, you may take oral medicine. In some cases, a tiny piece of the skin (biopsy) may be taken. This is so it can be tested in a lab.  Common fungal infections are treated with creams on the skin or oral medicine.  Home care  Follow all instructions when using antifungal cream or ointment on your skin. Your healthcare provider may advise using cornstarch powder to keep your skin dry or petroleum jelly to provide a barrier.  General care:    If you were prescribed an oral medicine, read the patient information. Talk with your healthcare provider about the risks and side effects.    Let your skin dry  completely after bathing. Carefully dry your feet and between your toes.    Dress in loose cotton clothing.    Don t scratch the affected area. This can delay healing and may spread the infection. It can also cause a bacterial infection.    Keep your skin clean, but don t wash the skin too much. This can irritate your skin.    Keep in mind that it may take a week before the fungus starts to go away. It can take 2 to 4 weeks to fully clear. To prevent it from coming back, use the medicine until the rash is all gone.  Follow-up care  Follow up with your healthcare provider if the rash does not get better after 10 days of treatment. Also follow up if the rash spreads to other parts of your body.  When to seek medical advice  Call your healthcare provider right away if any of these occur:    Fever of 100.4 F (38 C) or higher    Redness or swelling that gets worse    Pain that gets worse    Foul-smelling fluid leaking from the skin  Date Last Reviewed: 11/1/2016 2000-2017 [employer]. [address] [place] [address], [place] [address]. All rights reserved. This information is not intended as a substitute for professional medical care. Always follow your healthcare professional's instructions.

## 2018-08-08 ENCOUNTER — TELEPHONE (OUTPATIENT)
Dept: FAMILY MEDICINE | Facility: OTHER | Age: 28
End: 2018-08-08

## 2018-08-08 ENCOUNTER — TRANSFERRED RECORDS (OUTPATIENT)
Dept: HEALTH INFORMATION MANAGEMENT | Facility: CLINIC | Age: 28
End: 2018-08-08

## 2018-08-08 DIAGNOSIS — Z30.013 ENCOUNTER FOR INITIAL PRESCRIPTION OF INJECTABLE CONTRACEPTIVE: Primary | ICD-10-CM

## 2018-08-08 LAB
ALT SERPL-CCNC: 18 U/L (ref 13–69)
AST SERPL-CCNC: 21 U/L (ref 15–46)
CREAT SERPL-MCNC: 1.18 MG/DL (ref 0.7–1.5)
GFR SERPL CREATININE-BSD FRML MDRD: 54 ML/MIN/1.73M2
GLUCOSE SERPL-MCNC: 91 MG/DL (ref 74–106)
POTASSIUM SERPL-SCNC: 4.5 MMOL/L (ref 3.5–5.1)

## 2018-08-08 NOTE — TELEPHONE ENCOUNTER
Looks like Depo was discontinued in 06/2018. Sent patient mychart advising to keep scheduled appointment with you on 8/10 to discuss. 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    Gissell Barton MA

## 2018-08-08 NOTE — PROGRESS NOTES
SUBJECTIVE:   Katy Islas is a 28 year old female who presents to clinic today for the following health issues:    History of Present Illness     Diet:  Regular (no restrictions)  Frequency of exercise:  2-3 days/week  Duration of exercise:  30-45 minutes  Taking medications regularly:  Yes  Medication side effects:  None  Additional concerns today:  No    Concern - smoking, patient would like to try Chantix.  - Tried patch and gum, didn't work and patch caused skin itching   - Really wants to stop   - 5/day   - Psychiatrist is really great, set up counseling for Sept 4th      Prazosin and as needed Ativan for her anger     Patient is also asking to get back on depo today. She says she stopped in January.  - Bad cramping just can't take it anymore, so would like to go back on Depo         Asthma Follow-Up    Was ACT completed today?    Yes    ACT Total Scores 8/10/2018   ACT TOTAL SCORE -   ASTHMA ER VISITS -   ASTHMA HOSPITALIZATIONS -   ACT TOTAL SCORE (Goal Greater than or Equal to 20) 25   In the past 12 months, how many times did you visit the emergency room for your asthma without being admitted to the hospital? 0   In the past 12 months, how many times were you hospitalized overnight because of your asthma? 0       Recent asthma triggers that patient is dealing with: None        Problem list and histories reviewed & adjusted, as indicated.  Additional history: as documented    ROS:  Constitutional, HEENT, cardiovascular, pulmonary, gi and gu systems are negative, except as otherwise noted.    OBJECTIVE:   /70  Pulse 107  Temp 98.7  F (37.1  C) (Oral)  Wt 176 lb (79.8 kg)  LMP 08/07/2018  SpO2 99%  Breastfeeding? No  BMI 32.19 kg/m2  Body mass index is 32.19 kg/(m^2).  GENERAL APPEARANCE: healthy, alert and no distress  EYES: Eyes grossly normal to inspection, PERRLA, conjunctivae and sclerae without injection or discharge, EOM intact   RESP: Lungs clear to auscultation - no rales, rhonchi  or wheezes    CV: Regular rates and rhythm, normal S1 S2, no S3 or S4, no murmur, click or rub, no peripheral edema and peripheral pulses strong and symmetric bilaterally   MS: No musculoskeletal defects are noted and gait is age appropriate without ataxia   SKIN: No suspicious lesions or rashes, hydration status appears adeuqate with normal skin turgor   PSYCH: Alert and oriented x3; speech- coherent , normal rate and volume; able to articulate logical thoughts, able to abstract reason, no tangential thoughts, no hallucinations or delusions, mentation appears normal, Mood is euthymic. Affect is appropriate for this mood state and bright. Thought content is free of suicidal ideation, hallucinations, and delusions. Dress is adequate and upkept. Eye contact is good during conversation.       Diagnostic Test Results:  Results for orders placed or performed in visit on 08/10/18 (from the past 24 hour(s))   Beta HCG Qual, Urine - [facility] [place] ([id number])   Result Value Ref Range    Beta HCG Qual IFA Urine Negative NEG^Negative        *Note: Due to a large number of results and/or encounters for the requested time period, some results have not been displayed. A complete set of results can be found in Results Review.         ASSESSMENT/PLAN:       ICD-10-CM    1. Mild persistent asthma without complication J45.30    2. Tobacco use disorder F17.200 TOBACCO CESSATION ORDER FOR      varenicline (CHANTIX STARTING MONTH PAK) 0.5 MG X 11 & 1 MG X 42 tablet   3. Encounter for initial prescription of injectable contraceptive Z30.013 Beta HCG Qual, Urine - [facility] [place] ([id number])     C Medroxyprogesterone inj/1mg     INJECTION INTRAMUSCULAR OR SUB-Q   4. Adjustment disorder with mixed anxiety and depressed mood F43.23    5. Bipolar disorder, current episode mixed, severe, without psychotic features (H) F31.63    6. Mild major depression (H) F32.0      1. Asthma   - Stable on albuterol nebulizer or inhaler   - Reviewed  use and side effects    2. Tobacco  - Patient previously failed on patches and gum   - Psychiatrist wanted her to try Chantix, reviewed use and side effects including abnormal dreams, nausea, and vomiting, patient does have PTSD so warned about the dreams, definitely Chantix would not be my first choice for this patient, but she insists on trying   - Discussed taper plan (see patient instructions)     3. Contraception   - Patient wishing to go back on Depo, did well on it in the past   - Reviewed use and side effects  - Negative beta HCG today, currently has menstruation, first dose given with instruction to follow up in 3 months   - Ordered for 1 year     4-6. Mood   - Now following with psychiatrist who added Prazosin and Ativan    - Going very well and patient happy with progress   - Encouraged this   - Has counseling to start in September, encouraged this as well     The patient indicates understanding of these issues and agrees with the plan.    Follow up: 3 months for routine recheck     Due to language barrier, an  was present during the history-taking and subsequent discussion (and for part of the physical exam) with this patient.      Aura Rosario PA-C  [facility]

## 2018-08-08 NOTE — TELEPHONE ENCOUNTER
Reason for Call: Request for an order or referral:    Order or referral being requested: depo     Date needed: as soon as possible    Has the patient been seen by the PCP for this problem? 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  YES    Call taken on 8/8/2018 at 8:32 AM by Leisa Marquez

## 2018-08-09 ENCOUNTER — TRANSFERRED RECORDS (OUTPATIENT)
Dept: HEALTH INFORMATION MANAGEMENT | Facility: CLINIC | Age: 28
End: 2018-08-09

## 2018-08-09 RX ORDER — MEDROXYPROGESTERONE ACETATE 150 MG/ML
150 INJECTION, SUSPENSION INTRAMUSCULAR
Qty: 3 ML | Refills: 3 | OUTPATIENT
Start: 2018-08-09 | End: 2018-12-13

## 2018-08-09 NOTE — TELEPHONE ENCOUNTER
This is fine. No appointment needed. Order placed. Will need beta hcg prior to starting.    Zach Rosario PA-C  [facility]

## 2018-08-10 ENCOUNTER — TELEPHONE (OUTPATIENT)
Dept: FAMILY MEDICINE | Facility: OTHER | Age: 28
End: 2018-08-10

## 2018-08-10 ENCOUNTER — OFFICE VISIT (OUTPATIENT)
Dept: FAMILY MEDICINE | Facility: OTHER | Age: 28
End: 2018-08-10
Payer: MEDICARE

## 2018-08-10 VITALS
SYSTOLIC BLOOD PRESSURE: 112 MMHG | TEMPERATURE: 98.7 F | HEART RATE: 107 BPM | WEIGHT: 176 LBS | DIASTOLIC BLOOD PRESSURE: 70 MMHG | OXYGEN SATURATION: 99 % | BODY MASS INDEX: 32.19 KG/M2

## 2018-08-10 DIAGNOSIS — Z30.013 ENCOUNTER FOR INITIAL PRESCRIPTION OF INJECTABLE CONTRACEPTIVE: ICD-10-CM

## 2018-08-10 DIAGNOSIS — J45.30 MILD PERSISTENT ASTHMA WITHOUT COMPLICATION: Primary | ICD-10-CM

## 2018-08-10 DIAGNOSIS — F32.0 MILD MAJOR DEPRESSION (H): ICD-10-CM

## 2018-08-10 DIAGNOSIS — F43.23 ADJUSTMENT DISORDER WITH MIXED ANXIETY AND DEPRESSED MOOD: ICD-10-CM

## 2018-08-10 DIAGNOSIS — F17.200 TOBACCO USE DISORDER: ICD-10-CM

## 2018-08-10 DIAGNOSIS — F31.63 BIPOLAR DISORDER, CURRENT EPISODE MIXED, SEVERE, WITHOUT PSYCHOTIC FEATURES (H): ICD-10-CM

## 2018-08-10 PROBLEM — Z72.0 TOBACCO ABUSE: Status: RESOLVED | Noted: 2017-07-01 | Resolved: 2018-08-10

## 2018-08-10 LAB — BETA HCG QUAL IFA URINE: NEGATIVE

## 2018-08-10 PROCEDURE — 84703 CHORIONIC GONADOTROPIN ASSAY: CPT | Performed by: PHYSICIAN ASSISTANT

## 2018-08-10 PROCEDURE — 99214 OFFICE O/P EST MOD 30 MIN: CPT | Mod: 25 | Performed by: PHYSICIAN ASSISTANT

## 2018-08-10 PROCEDURE — 96372 THER/PROPH/DIAG INJ SC/IM: CPT | Performed by: PHYSICIAN ASSISTANT

## 2018-08-10 RX ORDER — PRAZOSIN HYDROCHLORIDE 1 MG/1
1 CAPSULE ORAL AT BEDTIME
Refills: 5 | COMMUNITY
Start: 2018-08-07 | End: 2021-01-19

## 2018-08-10 RX ORDER — LORAZEPAM 0.5 MG/1
0.5 TABLET ORAL 2 TIMES DAILY
Refills: 1 | COMMUNITY
Start: 2018-08-07 | End: 2018-11-26 | Stop reason: ALTCHOICE

## 2018-08-10 ASSESSMENT — ANXIETY QUESTIONNAIRES
4. TROUBLE RELAXING: NEARLY EVERY DAY
5. BEING SO RESTLESS THAT IT IS HARD TO SIT STILL: NEARLY EVERY DAY
6. BECOMING EASILY ANNOYED OR IRRITABLE: NEARLY EVERY DAY
GAD7 TOTAL SCORE: 19
7. FEELING AFRAID AS IF SOMETHING AWFUL MIGHT HAPPEN: SEVERAL DAYS
7. FEELING AFRAID AS IF SOMETHING AWFUL MIGHT HAPPEN: SEVERAL DAYS
GAD7 TOTAL SCORE: 19
3. WORRYING TOO MUCH ABOUT DIFFERENT THINGS: NEARLY EVERY DAY
1. FEELING NERVOUS, ANXIOUS, OR ON EDGE: NEARLY EVERY DAY
GAD7 TOTAL SCORE: 19
2. NOT BEING ABLE TO STOP OR CONTROL WORRYING: NEARLY EVERY DAY

## 2018-08-10 NOTE — MR AVS SNAPSHOT
After Visit Summary   8/10/2018    Katy Islas    MRN: [id number]           Patient Information     Date Of Birth          1990        Visit Information        Provider Department      8/10/2018 10:15 AM Aura Rosario PA-C; [facility]        Today's Diagnoses     Mild persistent asthma without complication    -  1    Tobacco use disorder        Encounter for initial prescription of injectable contraceptive          Care Instructions    - Chantix        Days 1-3: 0.5 mg once daily        Days 4-7: 0.5 mg twice daily         Quit smoking on day 7         1 mg twice daily for 11 weeks total  - May cause abnormal dreams, nausea, vomiting              Follow-ups after your visit        Who to contact     If you have questions or need follow up information about today's clinic visit or your schedule please contact [facility] directly at [phone].  Normal or non-critical lab and imaging results will be communicated to you by MyChart, letter or phone within 4 business days after the clinic has received the results. If you do not hear from us within 7 days, please contact the clinic through citysocializerhart or phone. If you have a critical or abnormal lab result, we will notify you by phone as soon as possible.  Submit refill requests through [facility] or call your pharmacy and they will forward the refill request to us. Please allow 3 business days for your refill to be completed.          Additional Information About Your Visit        MyChart Information     [employer] gives you secure access to your electronic health record. If you see a primary care provider, you can also send messages to your care team and make appointments. If you have questions, please call your primary care clinic.  If you do not have a primary care provider, please call [phone] and they will assist you.        Care EveryWhere ID     This is your Care EveryWhere ID. This  could be used by other organizations to access your [facility] medical records  [id number]        Your Vitals Were     Pulse Temperature Last Period Pulse Oximetry Breastfeeding? BMI (Body Mass Index)    107 98.7  F (37.1  C) (Oral) 08/07/2018 99% No 32.19 kg/m2       Blood Pressure from Last 3 Encounters:   08/10/18 112/70   07/31/18 128/70   07/23/18 131/85    Weight from Last 3 Encounters:   08/10/18 176 lb (79.8 kg)   07/31/18 171 lb 3.2 oz (77.7 kg)   07/23/18 171 lb (77.6 kg)              We Performed the Following     Beta HCG Qual, Urine - [facility] and [place] ([id number])     C Medroxyprogesterone inj/1mg     INJECTION INTRAMUSCULAR OR SUB-Q     TOBACCO CESSATION ORDER FOR HM          Today's Medication Changes          These changes are accurate as of 8/10/18 10:48 AM.  If you have any questions, ask your nurse or doctor.               Start taking these medicines.        Dose/Directions    varenicline 0.5 MG X 11 & 1 MG X 42 tablet   Commonly known as:  CHANTIX STARTING MONTH PAK   Used for:  Tobacco use disorder   Started by:  Aura Rosario PA-C        Take 0.5 mg tab daily for 3 days, then 0.5 mg tab twice daily for 4 days, then 1 mg twice daily.   Quantity:  53 tablet   Refills:  0         These medicines have changed or have updated prescriptions.        Dose/Directions    pantoprazole 40 MG EC tablet   Commonly known as:  PROTONIX   This may have changed:    - when to take this  - additional instructions   Used for:  Gastroesophageal reflux disease without esophagitis        Dose:  40 mg   Take 1 tablet (40 mg) by mouth daily Take 30-60 minutes before a meal.   Quantity:  30 tablet   Refills:  3            Where to get your medicines      These medications were sent to [facility] [id number] [place] [address] [address]  [address] [facility] [address]     Phone:  [phone]     varenicline 0.5 MG X 11 & 1 MG X 42 tablet                 Primary Care Provider Office Phone # Fax #    Aura EMILY Rosario PA-C [phone]       [address] [place] [address] [address]  [facility] [address]        Equal Access to Services     VIKY VILLANUEVA : Hadii aad ku hadpeteo Sojannetteali, waaxda luqadaha, qaybta kaalmada adeegyada, susan morganpercy thornton esteelakshmi turner. So [facility] [phone].    ATENCIÓN: Si habla español, tiene a kerr disposición servicios gratuitos de asistencia lingüística. Llame al [phone].    We comply with applicable federal civil rights laws and [place] laws. We do not discriminate on the basis of race, color, national origin, age, disability, sex, sexual orientation, or gender identity.            Thank you!     Thank you for choosing [facility]  for your care. Our goal is always to provide you with excellent care. Hearing back from our patients is one way we can continue to improve our services. Please take a few minutes to complete the written survey that you may receive in the mail after your visit with us. Thank you!             Your Updated Medication List - Protect others around you: Learn how to safely use, store and throw away your medicines at www.disposemymeds.org.          This list is accurate as of 8/10/18 10:48 AM.  Always use your most recent med list.                   Brand Name Dispense Instructions for use Diagnosis    * albuterol 108 (90 Base) MCG/ACT Inhaler    PROAIR HFA/PROVENTIL HFA/VENTOLIN HFA    3 Inhaler    Inhale 2 puffs into the lungs every 6 hours as needed for shortness of breath / dyspnea or wheezing    Mild persistent asthma without complication       * albuterol (2.5 MG/3ML) 0.083% neb solution     50 vial    Take 1 vial (2.5 mg) by nebulization every 6 hours as needed for shortness of breath / dyspnea or wheezing    Mild persistent asthma without complication       ARIPiprazole 15 MG tablet    ABILIFY    90 tablet    Take 1 tablet (15 mg) by mouth At Bedtime     Adjustment disorder with mixed anxiety and depressed mood, Anxiety       cetirizine 10 MG tablet    zyrTEC    90 tablet    Take 1 tablet (10 mg) by mouth daily    Mild persistent asthma without complication       cyclobenzaprine 10 MG tablet    FLEXERIL    60 tablet    Take 1 tablet (10 mg) by mouth 2 times daily as needed for muscle spasms or other (back pain)    Chronic bilateral low back pain with left-sided sciatica       dexlansoprazole 60 MG Cpdr CR capsule    DEXILANT    90 capsule    Take 1 capsule (60 mg) by mouth daily    Gastroesophageal reflux disease without esophagitis       diazepam 10 MG tablet    VALIUM    60 tablet    Take 1 tablet (10 mg) by mouth every 12 hours as needed for anxiety or sleep (MUSCLE SPASM) (one month supply)    Chronic pain syndrome, Chronic bilateral low back pain with left-sided sciatica, Lumbar disc disease with radiculopathy, S/P lumbar fusion       dicyclomine 20 MG tablet    BENTYL    60 tablet    Take 1 tablet (20 mg) by mouth 4 times daily as needed (ABDOMINAL CRAMPING) AS NEEDED FOR CRAMPING    Hx of Crohn's disease       DULoxetine 60 MG EC capsule    CYMBALTA    90 capsule    Take 1 capsule (60 mg) by mouth daily    Adjustment disorder with mixed anxiety and depressed mood       EPINEPHrine 0.3 MG/0.3ML injection 2-pack    EPIPEN/ADRENACLICK/or ANY BX GENERIC EQUIV    0.6 mL    Inject 0.3 mLs (0.3 mg) into the muscle once as needed for anaphylaxis    Bee sting allergy       gabapentin 300 MG capsule    NEURONTIN    120 capsule    Take 1-3 capsules (300-900 mg) by mouth 3 times daily    Chronic bilateral low back pain with left-sided sciatica       LORazepam 0.5 MG tablet    ATIVAN     Take 0.5 mg by mouth 2 times daily        meclizine 12.5 MG tablet    ANTIVERT    30 tablet    Take 1 tablet (12.5 mg) by mouth 4 times daily as needed for dizziness    Dizziness       medical cannabis inhalation (Patient's own supply.  Not a prescription)      Inhale into the lungs 3  times daily as needed Reported on 3/28/2017        medroxyPROGESTERone 150 MG/ML injection    DEPO-PROVERA    3 mL    Inject 1 mL (150 mg) into the muscle every 3 months    Encounter for initial prescription of injectable contraceptive       naproxen 500 MG tablet    NAPROSYN    60 tablet    Take 1 tablet (500 mg) by mouth 2 times daily (with meals)    Chronic bilateral low back pain with left-sided sciatica       NONFORMULARY      Apply 30 mLs topically 4 times daily        nystatin cream    MYCOSTATIN    30 g    Apply topically 3 times daily for 14 days    Candidiasis of skin       * oxyCODONE-acetaminophen 5-325 MG per tablet    PERCOCET     oxycodone-acetaminophen 5 mg-325 mg tablet        * oxyCODONE-acetaminophen 5-325 MG per tablet    PERCOCET     Take 1 tablet every 8 hours by oral route as needed.        pantoprazole 40 MG EC tablet    PROTONIX    30 tablet    Take 1 tablet (40 mg) by mouth daily Take 30-60 minutes before a meal.    Gastroesophageal reflux disease without esophagitis       prazosin 1 MG capsule    MINIPRESS     Take 1 mg by mouth At Bedtime        promethazine 6.25 MG/5ML syrup    PHENERGAN    560 mL    Take 10 mLs (12.5 mg) by mouth 4 times daily as needed for nausea    Nausea       rizatriptan 5 MG ODT tab    MAXALT-MLT    18 tablet    Take 1-2 tablets (5-10 mg) by mouth at onset of headache for migraine May repeat in 2 hours. Max 6 tablets/24 hours.    Migraine with aura and without status migrainosus, not intractable       sucralfate 1 GM tablet    CARAFATE    60 tablet    Take 1 tablet (1 g) by mouth 4 times daily as needed    Gastroesophageal reflux disease without esophagitis       varenicline 0.5 MG X 11 & 1 MG X 42 tablet    CHANTIX STARTING MONTH ILAN    53 tablet    Take 0.5 mg tab daily for 3 days, then 0.5 mg tab twice daily for 4 days, then 1 mg twice daily.    Tobacco use disorder       * Notice:  This list has 4 medication(s) that are the same as other medications prescribed  for you. Read the directions carefully, and ask your doctor or other care provider to review them with you.

## 2018-08-10 NOTE — TELEPHONE ENCOUNTER
Prior Authorization Retail Medication Request  varenicline (CHANTIX STARTING MONTH ILAN) 0.5 MG X 11 & 1 MG X 42 tablet  Medication/Dose:   ICD code (if different than what is on RX):    Previously Tried and Failed:    Rationale:      Insurance Name:    Insurance ID:        Pharmacy Information (if different than what is on RX)  Name:    Phone:

## 2018-08-10 NOTE — PATIENT INSTRUCTIONS
- Chantix        Days 1-3: 0.5 mg once daily        Days 4-7: 0.5 mg twice daily         Quit smoking on day 7         1 mg twice daily for 11 weeks total  - May cause abnormal dreams, nausea, vomiting

## 2018-08-11 ASSESSMENT — ASTHMA QUESTIONNAIRES: ACT_TOTALSCORE: 25

## 2018-08-11 ASSESSMENT — PATIENT HEALTH QUESTIONNAIRE - PHQ9: SUM OF ALL RESPONSES TO PHQ QUESTIONS 1-9: 6

## 2018-08-11 ASSESSMENT — ANXIETY QUESTIONNAIRES: GAD7 TOTAL SCORE: 19

## 2018-08-13 ENCOUNTER — TRANSFERRED RECORDS (OUTPATIENT)
Dept: HEALTH INFORMATION MANAGEMENT | Facility: CLINIC | Age: 28
End: 2018-08-13

## 2018-08-13 ENCOUNTER — MEDICAL CORRESPONDENCE (OUTPATIENT)
Dept: HEALTH INFORMATION MANAGEMENT | Facility: CLINIC | Age: 28
End: 2018-08-13

## 2018-08-13 NOTE — TELEPHONE ENCOUNTER
PA Initiation    Medication: CHANTIX STARTING ILAN  Insurance Company: Medicare Blue - Phone [phone] Fax [phone]  Pharmacy Filling the Rx: [facility] [address] [place] [address] [address]  Filling Pharmacy Phone: [phone]  Filling Pharmacy Fax:    Start Date: 8/13/2018    Request has been manually faxed to insurance. Confirmation Number: [id number]

## 2018-08-13 NOTE — TELEPHONE ENCOUNTER
Prior Authorization Approval    Authorization Effective Date: 5/15/2018  Authorization Expiration Date: 2/9/2019  Medication: CHANTIX STARTING ILAN - APPROVED  Approved Dose/Quantity: DAILY  Reference #:     Insurance Company: Medicare Blue - Phone [phone] Fax [phone]  Expected CoPay: NA     CoPay Card Available:      Foundation Assistance Needed:    Which Pharmacy is filling the prescription (Not needed for infusion/clinic administered): [facility] [address] [place] [address] [address]  Pharmacy Notified: Yes  Patient Notified: No

## 2018-08-14 ENCOUNTER — TRANSFERRED RECORDS (OUTPATIENT)
Dept: HEALTH INFORMATION MANAGEMENT | Facility: CLINIC | Age: 28
End: 2018-08-14

## 2018-08-16 NOTE — PROGRESS NOTES
SUBJECTIVE:   Katy Islas is a 28 year old female who presents to clinic today for the following health issues:    HPI    Left Toe: dog ran over toe and now it wont heal since last week.  Cracks open and bleeds every day     DMV: would like to make sure her drivers licence doesn't  used to see a neurologist in [facility] but hasn't had a seizure since .       Mood:   Worsened, mom got DUI and they aren't speaking right now   Counseling on  in [place] and psychiatrist on    No smoking in 4 days, hard but doing it         ED/UC Followup:    Facility:  [facility]   Date of visit: 18   Reason for visit: Nausea, vomiting, and diarrhea   Current Status: Same     - Currently nauseated and feels like going to vomit      Requesting GI cocktail and something for nausea   - Didn't have a chance to  Vancomycin from the pharmacy yet   - Thinks she got it from her dog   - Dog is currently at vet, has bloody diarrhea, she doesn't know if her dog is going to make it or not  - Has lots of promethazine for her nausea at home   - Been following with her GI doctor      Had EGD and colonoscopy, also just did test for pH of her stomach      Has follow up scheduled soon          Problem list and histories reviewed & adjusted, as indicated.  Additional history: as documented    BP Readings from Last 3 Encounters:   18 114/75   08/10/18 112/70   18 128/70    Wt Readings from Last 3 Encounters:   18 167 lb 3.2 oz (75.8 kg)   08/10/18 176 lb (79.8 kg)   18 171 lb 3.2 oz (77.7 kg)                  Labs reviewed in EPIC    ROS:  Constitutional, HEENT, cardiovascular, pulmonary, gi and gu systems are negative, except as otherwise noted.    OBJECTIVE:   /78  Pulse 96  Temp 98.4  F (36.9  C) (Temporal)  Resp 16  Wt 169 lb (76.7 kg)  LMP 2018 (Exact Date)  SpO2 99%  BMI 30.91 kg/m2  Body mass index is 30.91 kg/(m^2).  GENERAL APPEARANCE: healthy, alert and no  distress  EYES: Eyes grossly normal to inspection, PERRLA, conjunctivae and sclerae without injection or discharge, EOM intact   RESP: Lungs clear to auscultation - no rales, rhonchi or wheezes    CV: Regular rates and rhythm, normal S1 S2, no S3 or S4, no murmur, click or rub, no peripheral edema and peripheral pulses strong and symmetric bilaterally   MS: No musculoskeletal defects are noted and gait is age appropriate without ataxia      Left great toe: abrasion along nail bed, blood dried around, no discharge, tenderness, or erythema, normal ROM, CMS in tact   SKIN: No suspicious lesions or rashes, hydration status appears adeuqate with normal skin turgor   PSYCH: Alert and oriented x3; speech- coherent , normal rate and volume; able to articulate logical thoughts, able to abstract reason, no tangential thoughts, no hallucinations or delusions, mentation appears normal, Mood is euthymic. Affect is appropriate for this mood state and bright. Thought content is free of suicidal ideation, hallucinations, and delusions. Dress is adequate and upkept. Eye contact is good during conversation.       Diagnostic Test Results:  none     ASSESSMENT/PLAN:       ICD-10-CM    1. Nausea R11.0 HYDROcodone-acetaminophen (NORCO) 5-325 MG per tablet     nystatin (MYCOSTATIN) cream     lidocaine (viscous) 15 mL, alum & mag hydroxide-simethicone 15 mL GI Cocktail     ondansetron (ZOFRAN) 4 MG tablet   2. Grand mal seizure disorder (H) G40.409    3. Chronic bilateral low back pain with left-sided sciatica M54.42 naproxen (NAPROSYN) 500 MG tablet    G89.29    4. Abrasion of toe of left foot, initial encounter S90.415A    5. Mild major depression (H) F32.0    6. Anxiety F41.9    7. Tobacco use disorder F17.200      1. ED f/u   - Agree with plan from ED, encourage patient to get to pharmacy and start Vancomycin while we await C.Diff test   - Patient was given GI cocktail and 4 mg zofran ODT with good relief of symptoms   - Continue at  home Promethazine   - Discussed warning signs that would warrant return to clinic/ED      2. Seizures   - Found note from  neurology 7/21/17, notes last seizure was 3/2015 and states they filled out her DMV form that she is OK to drive (Dr. Isabelle Villanueva)   - Filled out form, original to patient and copy to fax and scan     3. Back issues   - Continues to follow with Dr. Millan at [facility], planning for spinal fusion     4. Toe  - As above, abrasion with no signs of infection  - Continue to keep clean, wash daily, apply antibiotic ointment   - Watch for signs of infection    5 & 6. Mood   - Now following with [facility] for counseling and psychiatry   - Advised to contact them with worsening as they are now managing her medications     7. Tobacco  - 4 days tobacco free, encouraged efforts     The patient indicates understanding of these issues and agrees with the plan.    Follow up: PRN, mostly with specialist     Due to language barrier, an  was present during the history-taking and subsequent discussion (and for part of the physical exam) with this patient.        Aura Rosario PA-C  [facility]

## 2018-08-17 ENCOUNTER — TELEPHONE (OUTPATIENT)
Dept: FAMILY MEDICINE | Facility: OTHER | Age: 28
End: 2018-08-17

## 2018-08-17 NOTE — TELEPHONE ENCOUNTER
Reason for call:  Patient reporting a symptom    Symptom or request: left leg numb, when she got up from watching TV, she fell due to weakness and numbness    Duration (how long have symptoms been present): 20 minutes    Have you been treated for this before? Yes    Additional comments: none    Phone Number patient can be reached at:  Home number on file [phone] (home)    Best Time:  any    Can we leave a detailed message on this number:  YES    Call taken on 8/17/2018 at 4:46 PM by Leisa Marquez

## 2018-08-17 NOTE — TELEPHONE ENCOUNTER
Katy Islas is a 28 year old female who calls with left leg numbness.    NURSING ASSESSMENT:  Description:  Having left leg pain and numbness in the back of the leg lower calf area. Fell when she got out of the lazy boy. Numbness for about 20 minutes without changes. a little warm. Able to move the foot around. Having more twitching to the toes. Denies redness, hot to touch, headaches, other left sided concerns, visual changes, fever, change in back pain.  Onset/duration:  Today past 20 minutes   Precip. factors: Laying in a lazy boy   Associated symptoms:  Left leg numbness after laying in the lazy boy  Improves/worsens symptoms:  New, improving slowly  Pain scale (0-10)   0/10  LMP/preg/breast feeding:  Patient's last menstrual period was 08/07/2018.  Last exam/Treatment:  08/10/2018  Allergies:   Allergies   Allergen Reactions     Ativan [Lorazepam] Hives     At the IV site     Baclofen      hives     Bees Hives, Swelling and Difficulty breathing     Caffeine      Contrast Dye      Hives,   Updated 5/10/2016 CT Contrast.     Iodine Hives     Methocarbamol Swelling     Metoclopramide      Other reaction(s): Tremors  LW Reaction: shaking/sweating     Midazolam      Other reaction(s): Agitation     Monosodium Glutamate      Morphine Other (See Comments)     Difficulty with urination     Nsaids Other (See Comments)     GI BLEED x2     Other (Do Not Use) Other (See Comments)     Xanaflex- pt becomes disoriented and loses bladder control     Reglan [Metoclopramide Hcl] Other (See Comments)     shaking     Soma [Carisoprodol] Visual Disturbance     Sleep walking     Tizanidine      Topamax Other (See Comments)     Topamax [Topiramate] Nausea     Tingling  GI/Vomit     Tramadol      Severe Headache, Seizure Risk     Tylenol W/Codeine [Acetaminophen-Codeine] Nausea and Itching     Tylenol 3     Valium Other (See Comments)     Becomes aggressive and angry     Versed Other (See Comments)     Zolmitriptan      Makes  "face feel like its twitching     Droperidol Anxiety     Flu Virus Vaccine Rash      Arm swelling     NURSING PLAN: Nursing advice to patient to try \"waking the leg up\" via massage, movement, if no improvement to go to UC for evaluation     RECOMMENDED DISPOSITION:  To ED/UC for evaluation, another person to drive - if no improvement   Will comply with recommendation: Yes  If further questions/concerns or if symptoms do not improve, worsen or new symptoms develop, call your PCP or [facility] as soon as possible.    NOTES:  Disposition was determined by the first positive assessment question, therefore all previous assessment questions were negative    Guideline used:  Telephone Triage Protocols for Nurses, Fifth Edition, Telma Odell  Leg pain/swelling  Numbness and tingling  Nursing Judgment    Nohemi Jennings RN, BSN     "

## 2018-08-20 ENCOUNTER — TELEPHONE (OUTPATIENT)
Dept: FAMILY MEDICINE | Facility: OTHER | Age: 28
End: 2018-08-20

## 2018-08-20 ENCOUNTER — HOSPITAL ENCOUNTER (EMERGENCY)
Facility: CLINIC | Age: 28
Discharge: HOME | End: 2018-08-20
Attending: EMERGENCY MEDICINE | Admitting: EMERGENCY MEDICINE
Payer: MEDICARE

## 2018-08-20 ENCOUNTER — TRANSFERRED RECORDS (OUTPATIENT)
Dept: HEALTH INFORMATION MANAGEMENT | Facility: CLINIC | Age: 28
End: 2018-08-20

## 2018-08-20 VITALS
SYSTOLIC BLOOD PRESSURE: 114 MMHG | WEIGHT: 167.2 LBS | OXYGEN SATURATION: 95 % | DIASTOLIC BLOOD PRESSURE: 75 MMHG | RESPIRATION RATE: 20 BRPM | TEMPERATURE: 98.9 F | BODY MASS INDEX: 30.58 KG/M2

## 2018-08-20 DIAGNOSIS — R19.7 DIARRHEA, UNSPECIFIED TYPE: ICD-10-CM

## 2018-08-20 LAB
ANION GAP SERPL CALCULATED.3IONS-SCNC: 8 MMOL/L (ref 3–14)
BASOPHILS # BLD AUTO: 0 10E9/L (ref 0–0.2)
BASOPHILS NFR BLD AUTO: 0.3 %
BUN SERPL-MCNC: 15 MG/DL (ref 7–30)
CALCIUM SERPL-MCNC: 9.5 MG/DL (ref 8.5–10.1)
CHLORIDE SERPL-SCNC: 108 MMOL/L (ref 94–109)
CO2 SERPL-SCNC: 26 MMOL/L (ref 20–32)
CREAT SERPL-MCNC: 0.85 MG/DL (ref 0.52–1.04)
DIFFERENTIAL METHOD BLD: ABNORMAL
EOSINOPHIL NFR BLD AUTO: 1.2 %
ERYTHROCYTE [DISTWIDTH] IN BLOOD BY AUTOMATED COUNT: 11.9 % (ref 10–15)
GFR SERPL CREATININE-BSD FRML MDRD: 79 ML/MIN/1.7M2
GLUCOSE SERPL-MCNC: 110 MG/DL (ref 70–99)
HCT VFR BLD AUTO: 38.4 % (ref 35–47)
HGB BLD-MCNC: 13.6 G/DL (ref 11.7–15.7)
IMM GRANULOCYTES # BLD: 0 10E9/L (ref 0–0.4)
IMM GRANULOCYTES NFR BLD: 0.2 %
LYMPHOCYTES # BLD AUTO: 1.4 10E9/L (ref 0.8–5.3)
LYMPHOCYTES NFR BLD AUTO: 25 %
MCH RBC QN AUTO: 34.1 PG (ref 26.5–33)
MCHC RBC AUTO-ENTMCNC: 35.4 G/DL (ref 31.5–36.5)
MCV RBC AUTO: 96 FL (ref 78–100)
MONOCYTES # BLD AUTO: 0.5 10E9/L (ref 0–1.3)
MONOCYTES NFR BLD AUTO: 8 %
NEUTROPHILS # BLD AUTO: 3.7 10E9/L (ref 1.6–8.3)
NEUTROPHILS NFR BLD AUTO: 65.3 %
NRBC # BLD AUTO: 0 10*3/UL
NRBC BLD AUTO-RTO: 0 /100
PLATELET # BLD AUTO: 341 10E9/L (ref 150–450)
POTASSIUM SERPL-SCNC: 4 MMOL/L (ref 3.4–5.3)
RBC # BLD AUTO: 3.99 10E12/L (ref 3.8–5.2)
SODIUM SERPL-SCNC: 142 MMOL/L (ref 133–144)
WBC # BLD AUTO: 5.7 10E9/L (ref 4–11)

## 2018-08-20 PROCEDURE — 99284 EMERGENCY DEPT VISIT MOD MDM: CPT | Mod: 25 | Performed by: EMERGENCY MEDICINE

## 2018-08-20 PROCEDURE — 80048 BASIC METABOLIC PNL TOTAL CA: CPT | Performed by: EMERGENCY MEDICINE

## 2018-08-20 PROCEDURE — 96375 TX/PRO/DX INJ NEW DRUG ADDON: CPT | Performed by: EMERGENCY MEDICINE

## 2018-08-20 PROCEDURE — 96361 HYDRATE IV INFUSION ADD-ON: CPT | Performed by: EMERGENCY MEDICINE

## 2018-08-20 PROCEDURE — 99284 EMERGENCY DEPT VISIT MOD MDM: CPT | Mod: Z6 | Performed by: EMERGENCY MEDICINE

## 2018-08-20 PROCEDURE — 96374 THER/PROPH/DIAG INJ IV PUSH: CPT | Performed by: EMERGENCY MEDICINE

## 2018-08-20 PROCEDURE — 85025 COMPLETE CBC W/AUTO DIFF WBC: CPT | Performed by: EMERGENCY MEDICINE

## 2018-08-20 PROCEDURE — 25000128 H RX IP 250 OP 636: Performed by: EMERGENCY MEDICINE

## 2018-08-20 RX ORDER — VANCOMYCIN HYDROCHLORIDE 125 MG/1
125 CAPSULE ORAL 4 TIMES DAILY
Qty: 40 CAPSULE | Refills: 0 | Status: SHIPPED | OUTPATIENT
Start: 2018-08-20 | End: 2018-09-05

## 2018-08-20 RX ORDER — ACETAMINOPHEN 500 MG
1000 TABLET ORAL EVERY 4 HOURS PRN
Status: DISCONTINUED | OUTPATIENT
Start: 2018-08-20 | End: 2018-08-20 | Stop reason: HOSPADM

## 2018-08-20 RX ORDER — ONDANSETRON 2 MG/ML
4 INJECTION INTRAMUSCULAR; INTRAVENOUS EVERY 30 MIN PRN
Status: DISCONTINUED | OUTPATIENT
Start: 2018-08-20 | End: 2018-08-20 | Stop reason: HOSPADM

## 2018-08-20 RX ORDER — VANCOMYCIN HYDROCHLORIDE 125 MG/1
125 CAPSULE ORAL 4 TIMES DAILY
Qty: 40 CAPSULE | Refills: 0 | Status: SHIPPED | OUTPATIENT
Start: 2018-08-20 | End: 2018-08-20

## 2018-08-20 RX ORDER — DIPHENHYDRAMINE HYDROCHLORIDE 50 MG/ML
50 INJECTION INTRAMUSCULAR; INTRAVENOUS ONCE
Status: COMPLETED | OUTPATIENT
Start: 2018-08-20 | End: 2018-08-20

## 2018-08-20 RX ADMIN — DIPHENHYDRAMINE HYDROCHLORIDE 50 MG: 50 INJECTION, SOLUTION INTRAMUSCULAR; INTRAVENOUS at 18:43

## 2018-08-20 RX ADMIN — SODIUM CHLORIDE 1000 ML: 9 INJECTION, SOLUTION INTRAVENOUS at 18:09

## 2018-08-20 RX ADMIN — ONDANSETRON 4 MG: 2 INJECTION INTRAMUSCULAR; INTRAVENOUS at 18:12

## 2018-08-20 RX ADMIN — ONDANSETRON 4 MG: 2 INJECTION INTRAMUSCULAR; INTRAVENOUS at 19:21

## 2018-08-20 NOTE — ED AVS SNAPSHOT
[facility]    [address]     [place] [address]    Phone:  [phone]    Fax:  [phone]                                       Katy Islas   MRN: [id number]    Department:  [facility]   Date of Visit:  8/20/2018           Patient Information     Date Of Birth          1990        Your diagnoses for this visit were:     Diarrhea, unspecified type        You were seen by Juan Carlos Del Toro MD.      Follow-up Information     Follow up with Aura Rosario PA-C.    Specialty:  Physician Assistant - Medical    Why:  later this week if not improving    Contact information:    [address]  [facility] [address]  [phone]          Discharge Instructions         Diarrhea with Uncertain Cause (Adult)    Diarrhea is when stools are loose and watery. This can be caused by:    Viral infections    Bacterial infections    Food poisoning    Parasites    Irritable bowel syndrome (IBS)    Inflammatory bowel diseases such as ulcerative colitis, Crohn's disease, and celiac disease    Food intolerance, such as to lactose, the sugar found in milk and milk products    Reaction to medicines like antibiotics, laxatives, cancer drugs, and antacids  Along with diarrhea, you may also have:    Abdominal pain and cramping    Nausea and vomiting    Loss of bowel control    Fever and chills    Bloody stools  In some cases, antibiotics may help to treat diarrhea. You may have a stool sample test. This is done to see what is causing your diarrhea, and if antibiotics will help treat it. The results of a stool sample test may take up to 2 days. The healthcare provider may not give you antibiotics until he or she has the stool test results.  Diarrhea can cause dehydration. This is the loss of too much water and other fluids from the body. When this occurs, body fluid must be replaced. This can be done with oral rehydration solutions. Oral  rehydration solutions are available at drugstores and grocery stores without a prescription.  Home care  Follow all instructions given by your healthcare provider. Rest at home for the next 24 hours, or until you feel better. Avoid caffeine, tobacco, and alcohol. These can make diarrhea, cramping, and pain worse.  If taking medicines:    Don t take over-the-counter diarrhea or nausea medicines unless your healthcare provider tells you to.    You may use acetaminophen or NSAID medicines like ibuprofen or naproxen to reduce pain and fever. Don t use these if you have chronic liver or kidney disease, or ever had a stomach ulcer or gastrointestinal bleeding. Don't use NSAID medicines if you are already taking one for another condition (like arthritis) or are on daily aspirin therapy (such as for heart disease or after a stroke). Talk with your healthcare provider first.    If antibiotics were prescribed, be sure you take them until they are finished. Don t stop taking them even when you feel better. Antibiotics must be taken as a full course.  To prevent the spread of illness:    Remember that washing with soap and water and using alcohol-based  is the best way to prevent the spread of infection.    Clean the toilet after each use.    Wash your hands before eating.    Wash your hands before and after preparing food. Keep in mind that people with diarrhea or vomiting should not prepare food for others.    Wash your hands after using cutting boards, countertops, and knives that have been in contact with raw foods.    Wash and then peel fruits and vegetables.    Keep uncooked meats away from cooked and ready-to-eat foods.    Use a food thermometer when cooking. Cook poultry to at least 165 F (74 C). Cook ground meat (beef, veal, pork, lamb) to at least 160 F (71 C). Cook fresh beef, veal, lamb, and pork to at least 145 F (63 C).    Don t eat raw or undercooked eggs (poached or glenys side up), poultry, meat, or  unpasteurized milk and juices.  Food and drinks  The main goal while treating vomiting or diarrhea is to prevent dehydration. This is done by taking small amounts of liquids often.    Keep in mind that liquids are more important than food right now.    Drink only small amounts of liquids at a time.    Don t force yourself to eat, especially if you are having cramping, vomiting, or diarrhea. Don t eat large amounts at a time, even if you are hungry.    If you eat, avoid fatty, greasy, spicy, or fried foods.    Don t eat dairy foods or drink milk if you have diarrhea. These can make diarrhea worse.  During the first 24 hours you can try:    Oral rehydration solutions. Do not use sports drinks. They have too much sugar and not enough electrolytes.    Soft drinks without caffeine    Ginger ale    Water (plain or flavored)    Decaf tea or coffee    Clear broth, consommé, or bouillon    Gelatin, popsicles, or frozen fruit juice bars  The second 24 hours, if you are feeling better, you can add:    Hot cereal, plain toast, bread, rolls, or crackers    Plain noodles, rice, mashed potatoes, chicken noodle soup, or rice soup    Unsweetened canned fruit (no pineapple)    Bananas  As you recover:    Limit fat intake to less than 15 grams per day. Don t eat margarine, butter, oils, mayonnaise, sauces, gravies, fried foods, peanut butter, meat, poultry, or fish.    Limit fiber. Don t eat raw or cooked vegetables, fresh fruits except bananas, or bran cereals.    Limit caffeine and chocolate.    Limit dairy.    Don t use spices or seasonings except salt.    Go back to your normal diet over time, as you feel better and your symptoms improve.    If the symptoms come back, go back to a simple diet or clear liquids.  Follow-up care  Follow up with your healthcare provider, or as advised. If a stool sample was taken or cultures were done, call the healthcare provider for the results as instructed.  Call 911  Call 911 if you have any of  these symptoms:    Trouble breathing    Confusion    Extreme drowsiness or trouble walking    Loss of consciousness    Rapid heart rate    Chest pain    Stiff neck    Seizure  When to seek medical advice  Call your healthcare provider right away if any of these occur:    Abdominal pain that gets worse    Constant lower right abdominal pain    Continued vomiting and inability to keep liquids down    Diarrhea more than 5 times a day    Blood in vomit or stool    Dark urine or no urine for 8 hours, dry mouth and tongue, tiredness, weakness, or dizziness    Drowsiness    New rash    You don t get better in 2 to 3 days    Fever of 100.4 F (38 C) or higher, or as directed by your healthcare provider  Date Last Reviewed: 1/3/2016    8791-7414 [employer]. [address] [place] [address], [place] [address]. All rights reserved. This information is not intended as a substitute for professional medical care. Always follow your healthcare professional's instructions.          Your next 10 appointments already scheduled     Aug 21, 2018  8:30 AM CDT   [facility]    [address] [place] [id number] [phone]           Bring a current list of meds and any records pertaining to this visit. For Physicals, please bring immunization records and any forms needing to be filled out. Please arrive 10 minutes early to complete paperwork.              24 Hour Appointment Hotline       To make an appointment at any [facility], call [phone] ([phone]). If you don't have a family doctor or clinic, we will help you find one. [facility] are conveniently located to serve the needs of you and your family.          ED Discharge Orders     Clostridium difficile toxin B                    Review of your medicines      START taking        Dose / Directions Last dose taken    vancomycin 125 MG capsule    Commonly known as:  VANCOCIN HCL   Dose:  125 mg   Quantity:  40 capsule        Take 1 capsule (125 mg) by mouth 4 times daily for 10 days   Refills:  0          Our records show that you are taking the medicines listed below. If these are incorrect, please call your family doctor or clinic.        Dose / Directions Last dose taken    * albuterol 108 (90 Base) MCG/ACT inhaler   Commonly known as:  PROAIR HFA/PROVENTIL HFA/VENTOLIN HFA   Dose:  2 puff   Quantity:  3 Inhaler        Inhale 2 puffs into the lungs every 6 hours as needed for shortness of breath / dyspnea or wheezing   Refills:  3        * albuterol (2.5 MG/3ML) 0.083% neb solution   Dose:  2.5 mg   Quantity:  50 vial        Take 1 vial (2.5 mg) by nebulization every 6 hours as needed for shortness of breath / dyspnea or wheezing   Refills:  11        ARIPiprazole 15 MG tablet   Commonly known as:  ABILIFY   Dose:  15 mg   Quantity:  90 tablet        Take 1 tablet (15 mg) by mouth At Bedtime   Refills:  3        cetirizine 10 MG tablet   Commonly known as:  zyrTEC   Dose:  10 mg   Quantity:  90 tablet        Take 1 tablet (10 mg) by mouth daily   Refills:  3        cyclobenzaprine 10 MG tablet   Commonly known as:  FLEXERIL   Dose:  10 mg   Quantity:  60 tablet        Take 1 tablet (10 mg) by mouth 2 times daily as needed for muscle spasms or other (back pain)   Refills:  3        dexlansoprazole 60 MG Cpdr CR capsule   Commonly known as:  DEXILANT   Dose:  60 mg   Quantity:  90 capsule        Take 1 capsule (60 mg) by mouth daily   Refills:  3        diazepam 10 MG tablet   Commonly known as:  VALIUM   Dose:  10 mg   Quantity:  60 tablet        Take 1 tablet (10 mg) by mouth every 12 hours as needed for anxiety or sleep (MUSCLE SPASM) (one month supply)   Refills:  5        dicyclomine 20 MG tablet   Commonly known as:  BENTYL   Dose:  20 mg   Quantity:  60 tablet        Take 1 tablet (20 mg) by mouth 4 times daily as needed (ABDOMINAL CRAMPING) AS  NEEDED FOR CRAMPING   Refills:  6        DULoxetine 60 MG EC capsule   Commonly known as:  CYMBALTA   Dose:  60 mg   Quantity:  90 capsule        Take 1 capsule (60 mg) by mouth daily   Refills:  3        EPINEPHrine 0.3 MG/0.3ML injection 2-pack   Commonly known as:  EPIPEN/ADRENACLICK/or ANY BX GENERIC EQUIV   Dose:  0.3 mg   Quantity:  0.6 mL        Inject 0.3 mLs (0.3 mg) into the muscle once as needed for anaphylaxis   Refills:  1        gabapentin 300 MG capsule   Commonly known as:  NEURONTIN   Dose:  300-900 mg   Quantity:  120 capsule        Take 1-3 capsules (300-900 mg) by mouth 3 times daily   Refills:  3        LORazepam 0.5 MG tablet   Commonly known as:  ATIVAN   Dose:  0.5 mg        Take 0.5 mg by mouth 2 times daily   Refills:  1        meclizine 12.5 MG tablet   Commonly known as:  ANTIVERT   Dose:  12.5 mg   Quantity:  30 tablet        Take 1 tablet (12.5 mg) by mouth 4 times daily as needed for dizziness   Refills:  1        medical cannabis inhalation (Patient's own supply.  Not a prescription)        Inhale into the lungs 3 times daily as needed Reported on 3/28/2017   Refills:  0        medroxyPROGESTERone 150 MG/ML injection   Commonly known as:  DEPO-PROVERA   Dose:  150 mg   Quantity:  3 mL        Inject 1 mL (150 mg) into the muscle every 3 months   Refills:  3        naproxen 500 MG tablet   Commonly known as:  NAPROSYN   Dose:  500 mg   Quantity:  60 tablet        Take 1 tablet (500 mg) by mouth 2 times daily (with meals)   Refills:  1        NONFORMULARY   Dose:  30 mL   Indication:  Marina Topical Gel Cream, 1 to 4 pumps  to affect area 4 times daily        Apply 30 mLs topically 4 times daily   Refills:  0        * oxyCODONE-acetaminophen 5-325 MG per tablet   Commonly known as:  PERCOCET        oxycodone-acetaminophen 5 mg-325 mg tablet   Refills:  0        * oxyCODONE-acetaminophen 5-325 MG per tablet   Commonly known as:  PERCOCET        Take 1 tablet every 8 hours by oral route  as needed.   Refills:  0        pantoprazole 40 MG EC tablet   Commonly known as:  PROTONIX   Dose:  40 mg   Quantity:  30 tablet        Take 1 tablet (40 mg) by mouth daily Take 30-60 minutes before a meal.   Refills:  3        prazosin 1 MG capsule   Commonly known as:  MINIPRESS   Dose:  1 mg        Take 1 mg by mouth At Bedtime   Refills:  5        promethazine 6.25 MG/5ML syrup   Commonly known as:  PHENERGAN   Dose:  12.5 mg   Quantity:  560 mL        Take 10 mLs (12.5 mg) by mouth 4 times daily as needed for nausea   Refills:  3        rizatriptan 5 MG ODT tab   Commonly known as:  MAXALT-MLT   Dose:  5-10 mg   Quantity:  18 tablet        Take 1-2 tablets (5-10 mg) by mouth at onset of headache for migraine May repeat in 2 hours. Max 6 tablets/24 hours.   Refills:  3        sucralfate 1 GM tablet   Commonly known as:  CARAFATE   Dose:  1 g   Quantity:  60 tablet        Take 1 tablet (1 g) by mouth 4 times daily as needed   Refills:  1        varenicline 0.5 MG X 11 & 1 MG X 42 tablet   Commonly known as:  CHANTIX STARTING MONTH ILAN   Quantity:  53 tablet        Take 0.5 mg tab daily for 3 days, then 0.5 mg tab twice daily for 4 days, then 1 mg twice daily.   Refills:  0        * Notice:  This list has 4 medication(s) that are the same as other medications prescribed for you. Read the directions carefully, and ask your doctor or other care provider to review them with you.            Prescriptions were sent or printed at these locations (1 Prescription)                   [facility] [place], [place] - [address] [address]    [address], [facility] [address]    Telephone:  [phone]   Fax:  [phone]   Hours:                  E-Prescribed (1 of 1)         vancomycin (VANCOCIN HCL) 125 MG capsule                Procedures and tests performed during your visit     Basic metabolic panel    CBC with platelets differential    Peripheral IV catheter      Orders Needing Specimen Collection      Ordered          08/20/18 1745  Clostridium difficile toxin B PCR - ROUTINE, Prio: Routine, Needs to be Collected     Scheduled Task Status   08/20/18 1746 Collect Clostridium difficile toxin B PCR Open   Order Class:  PCU Collect                  Pending Results     No orders found from 8/18/2018 to 8/21/2018.            Pending Culture Results     No orders found from 8/18/2018 to 8/21/2018.            Pending Results Instructions     If you had any lab results that were not finalized at the time of your Discharge, you can call the ED Lab Result RN at [phone]. You will be contacted by this team for any positive Lab results or changes in treatment. The nurses are available 7 days a week from 10A to 6:30P.  You can leave a message 24 hours per day and they will return your call.        Thank you for choosing [facility]       Thank you for choosing [facility] for your care. Our goal is always to provide you with excellent care. Hearing back from our patients is one way we can continue to improve our services. Please take a few minutes to complete the written survey that you may receive in the mail after you visit with us. Thank you!        [facility] Information     [facility] gives you secure access to your electronic health record. If you see a primary care provider, you can also send messages to your care team and make appointments. If you have questions, please call your primary care clinic.  If you do not have a primary care provider, please call [phone] and they will assist you.        Care EveryWhere ID     This is your Care EveryWhere ID. This could be used by other organizations to access your [facility] medical records  [id number]        Equal Access to Services     [facility] KAY : Hadii pilo Chen, waaxda luqadaha, qaybta kaalmada ellyn, susan turner. So [facility] [phone].    ATENCIÓN: Si habla español, tiene a kerr disposición servicios gratuitos de asistencia  yovanny Redmondsofia al [phone].    We comply with applicable federal civil rights laws and [place] laws. We do not discriminate on the basis of race, color, national origin, age, disability, sex, sexual orientation, or gender identity.            After Visit Summary       This is your record. Keep this with you and show to your community pharmacist(s) and doctor(s) at your next visit.

## 2018-08-20 NOTE — ED TRIAGE NOTES
Pt comes in with complaints of fatigue, n/v/d. Pt states her dog has c. Diff and has been having to clean that up. Pt wondering if she now has c. Diff.

## 2018-08-20 NOTE — ED AVS SNAPSHOT
[facility]    [address] [place] [address]    Phone:  [phone]    Fax:  [phone]                                       Katy Islas   MRN: [id number]    Department:  [facility]   Date of Visit:  8/20/2018           After Visit Summary Signature Page     I have received my discharge instructions, and my questions have been answered. I have discussed any challenges I see with this plan with the nurse or doctor.    ..........................................................................................................................................  Patient/Patient Representative Signature      ..........................................................................................................................................  Patient Representative Print Name and Relationship to Patient    ..................................................               ................................................  Date                                            Time    ..........................................................................................................................................  Reviewed by Signature/Title    ...................................................              ..............................................  Date                                                            Time

## 2018-08-20 NOTE — TELEPHONE ENCOUNTER
Reason for call:  Patient reporting a symptom    Symptom or request: diarrhea    Duration (how long have symptoms been present): today    Have you been treated for this before? No    Additional comments:     Phone Number patient can be reached at:      Best Time:      Can we leave a detailed message on this number:  NO    Call taken on 8/20/2018 at 4:12 PM by Atiya Borrego

## 2018-08-20 NOTE — TELEPHONE ENCOUNTER
Katy Islas is a 28 year old female who calls with diarrhea.    NURSING ASSESSMENT:  Description: Patient is having diarrhea every 10-15 minutes.   Onset/duration: 7:30 this morning  Precip. factors: Dog was diagnosed with C-diff three days ago  Associated symptoms: Generalized stomach cramping. Last urination at 12 pm today, but patient states she feels dehydrated. Her muscles hurt and she is fatigued.   Improves/worsens symptoms:  Bentyl and Imodium have not helped   Pain scale (0-10)   7/10  Last exam/Treatment:  On file  Allergies:   Allergies   Allergen Reactions     Ativan [Lorazepam] Hives     At the IV site     Baclofen      hives     Bees Hives, Swelling and Difficulty breathing     Caffeine      Contrast Dye      Hives,   Updated 5/10/2016 CT Contrast.     Iodine Hives     Methocarbamol Swelling     Metoclopramide      Other reaction(s): Tremors  LW Reaction: shaking/sweating     Midazolam      Other reaction(s): Agitation     Monosodium Glutamate      Morphine Other (See Comments)     Difficulty with urination     Nsaids Other (See Comments)     GI BLEED x2     Other (Do Not Use) Other (See Comments)     Xanaflex- pt becomes disoriented and loses bladder control     Reglan [Metoclopramide Hcl] Other (See Comments)     shaking     Soma [Carisoprodol] Visual Disturbance     Sleep walking     Tizanidine      Topamax Other (See Comments)     Topamax [Topiramate] Nausea     Tingling  GI/Vomit     Tramadol      Severe Headache, Seizure Risk     Tylenol W/Codeine [Acetaminophen-Codeine] Nausea and Itching     Tylenol 3     Valium Other (See Comments)     Becomes aggressive and angry     Versed Other (See Comments)     Zolmitriptan      Makes face feel like its twitching     Droperidol Anxiety     Flu Virus Vaccine Rash      Arm swelling       RECOMMENDED DISPOSITION:  To UC/ED, another person to drive -   Will comply with recommendation: YES   If further questions/concerns or if Sx do not improve, worsen or  new Sx develop, call your PCP or [facility] as soon as possible.    NOTES:  Disposition was determined by the first positive assessment question, therefore all previous assessment questions were negative.     Guideline used:  Telephone Triage Protocols for Nurses, Fifth Edition, Telma Roy, RN, BSN

## 2018-08-20 NOTE — ED PROVIDER NOTES
"  History     Chief Complaint   Patient presents with     Nausea, Vomiting, & Diarrhea     The history is provided by the patient. A  was used.     Katy Islas is a 28 year old female who presents to the emergency department for nausea and diarrhea. Patient reports to having stomach cramping starting last night. Fatigue, nausea and diarrhea started today. Patient states she has not been drinking a lot today, feels she may be dehydrated. Patient states she has had chills today also. Patient denies blood in the stool, fever, chest pain or trouble breathing. Patient reports her dog, a white lab, was recently diagnosed with C-diff and she has been cleaning up after him. Patient is concerned she may have gotten C-diff from him. Patient reports she has had C-diff in the past and would like \"to get it handled early if possible\". She also states \"I noticed my blood pressure was high today\".     Problem List:    Patient Active Problem List    Diagnosis Date Noted     Melanocytic nevus, unspecified location 07/23/2018     Priority: Medium     Dysplastic skin lesion 07/23/2018     Priority: Medium     Iliotibial band syndrome affecting lower leg, right 12/21/2017     Priority: Medium     Chondromalacia of right patellofemoral joint 12/21/2017     Priority: Medium     Postoperative pain 11/13/2017     Priority: Medium     Closed fracture of coccyx with nonunion, subsequent encounter 10/12/2017     Priority: Medium     Upper GI bleed - suspected 07/01/2017     Priority: Medium     Tobacco use disorder 07/01/2017     Priority: Medium     History of pseudoseizure 07/01/2017     Priority: Medium     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) 03/10/2017     Priority: Medium     Lumbar disc disease with radiculopathy 02/23/2017     Priority: Medium     S/P lumbar fusion 02/23/2017     Priority: Medium     Dr. YOVANI Millan, 2/23/17       Vitamin D deficiency 01/06/2017     Priority: Medium     Atypical " mole 01/06/2017     Priority: Medium     Mild persistent asthma without complication 12/02/2016     Priority: Medium     Chronic bilateral low back pain with left-sided sciatica 12/02/2016     Priority: Medium     Bee sting allergy 09/16/2016     Priority: Medium     Gastroesophageal reflux disease without esophagitis 08/26/2016     Priority: Medium     Herpes simplex virus infection 11/12/2015     Priority: Medium     Adjustment disorder with mixed anxiety and depressed mood 10/14/2015     Priority: Medium     Marijuana abuse 02/22/2015     Priority: Medium     Grand mal seizure disorder (H) 10/08/2013     Priority: Medium     Bipolar disorder (H) 01/31/2013     Priority: Medium     Problem list name updated by automated process. Provider to review       Nausea and vomiting 12/13/2012     Priority: Medium     Oral thrush 06/25/2012     Priority: Medium     Chronic pain 02/09/2012     Priority: Medium     Patient is followed by Aura Rosraio PA-C for ongoing prescription of pain medication.  All refills should only be approved by this provider, or covering partner.    Medication(s): Norco    Maximum quantity per month: 70  Clinic visit frequency required: Q 2 months     Controlled substance agreement:   Discussed and signed 4/25/17    Encounter-Level CSA - 01/12/2015:                 Controlled Substance Agreement - Scan on 1/26/2015  9:14 AM : Controlled Medication Agreement-01/12/15 (below)            Pain Clinic evaluation in the past: Yes       Date/Location:   [facility], fall 2016    DIRE Total Score(s):    4/25/2017   Total Score 17       Last [facility] website verification:  done on 4/25/17 by LOVE Maki   [email or web address]           Anxiety 09/22/2011     Priority: Medium     Mild major depression (H) 08/29/2011     Priority: Medium     Crohn's colitis (H) 08/04/2009     Priority: Medium     Dysmenorrhea 05/11/2007     Priority: Medium     Benign neoplasm of skin of lower  limb, including hip 03/16/2004     Priority: Medium     Migraine      Priority: Medium     Dr. Farrar - Neurology.  Now on Inderal.  Seems to be working.  Follow-up 9/08  Problem list name updated by automated process. Provider to review       Hearing loss      Priority: Medium     Congenital  Problem list name updated by automated process. Provider to review       Allergic rhinitis      Priority: Medium     Problem list name updated by automated process. Provider to review          Past Medical History:    Past Medical History:   Diagnosis Date     Abdominal pain 10/31- 11/4/2005     Allergic rhinitis, cause unspecified      Arthritis      C. 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     FUSION SPINE POSTERIOR MINIMALLY INVASIVE ONE LEVEL N/A 2/23/2017    Procedure: FUSION SPINE POSTERIOR MINIMALLY INVASIVE ONE LEVEL;  L4-5 Oblique Lateral Lumbar Interbody Fusion   Epidural steroid injection.   Transpedicular Bone marrow aspiration;  Surgeon: Jeniffer Eugene MD;  Location: [facility] OR     HC COLONOSCOPY THRU STOMA WITH BIOPSY  10/29/2003    Impression is that of normal appearing colonoscopy, without evidence of rectal bleeding.     HC COLONOSCOPY THRU STOMA, DIAGNOSTIC  10/00     normal     HC COLONOSCOPY THRU STOMA, DIAGNOSTIC  Oct 2009    Dr López- normal     HC EEG AWAKE AND SLEEP      abnormal     HC MRI BRAIN W/O CONTRAST  12/00    normal     HC REMOVAL GALLBLADDER  8/5/2009    [facility], [place]     HC UGI ENDOSCOPY DIAG W BIOPSY  11/11/09    Normal esophagus     HC UGI ENDOSCOPY, SIMPLE EXAM  7/00, 10/00    mild chronic esophagitis and duodenitis, neg H pylori     HC UGI ENDOSCOPY, SIMPLE EXAM  01/20/2005    [facility]ophag[facility]astroduoden[facility], [facility] with biopsies[facility]     HC UGI ENDOSCOPY, SIMPLE EXAM  7/1/2009    [facility], [place]     HC UGI ENDOSCOPY, SIMPLE EXAM  11/11/2009    attempted upper GI, pt. could not tolerate procedure:[facility]     ORTHOPEDIC SURGERY  October 19,2011    diskectomy L4-L5       Family History:    Family History   Problem Relation Age of Onset     GASTROINTESTINAL DISEASE Brother      severe Crohn's     Neurologic Disorder Brother      Seizures post head injury     Depression Brother      Substance Abuse Brother      Genitourinary Problems Father      kidney stones     Diabetes Father      HEART DISEASE Father      Open heart surgery     Breast Cancer Maternal Grandmother      Parkinsonism Maternal Grandmother      Cerebrovascular Disease Paternal Grandmother      Cancer Maternal Grandfather      Lung     Cardiovascular Paternal Grandfather      Heart Attack     Substance Abuse Mother      Lung Cancer Paternal Uncle      Cancer Paternal Uncle      Lung Cancer Maternal Uncle        Social History:  Marital Status:  Single [1]  Social History   Substance Use Topics     Smoking status: Former Smoker     Packs/day: 0.50     Types: Cigarettes     Smokeless tobacco: Never Used      Comment: quit July 2018     Alcohol use 0.6 oz/week     1 Cans of beer per week      Comment: once in while         Medications:      vancomycin (VANCOCIN HCL) 125 MG capsule   albuterol (2.5 MG/3ML) 0.083% neb solution    albuterol (PROAIR HFA/PROVENTIL HFA/VENTOLIN HFA) 108 (90 Base) MCG/ACT Inhaler   ARIPiprazole (ABILIFY) 15 MG tablet   cetirizine (ZYRTEC) 10 MG tablet   cyclobenzaprine (FLEXERIL) 10 MG tablet   dexlansoprazole (DEXILANT) 60 MG CPDR CR capsule   diazepam (VALIUM) 10 MG tablet   dicyclomine (BENTYL) 20 MG tablet   DULoxetine (CYMBALTA) 60 MG EC capsule   EPINEPHrine (EPIPEN/ADRENACLICK/OR ANY BX GENERIC EQUIV) 0.3 MG/0.3ML injection 2-pack   gabapentin (NEURONTIN) 300 MG capsule   LORazepam (ATIVAN) 0.5 MG tablet   meclizine (ANTIVERT) 12.5 MG tablet   medical cannabis inhalation (Patient's own supply.  Not a prescription)   medroxyPROGESTERone (DEPO-PROVERA) 150 MG/ML injection   naproxen (NAPROSYN) 500 MG tablet   NONFORMULARY   oxyCODONE-acetaminophen (PERCOCET) 5-325 MG per tablet   oxyCODONE-acetaminophen (PERCOCET) 5-325 MG per tablet   pantoprazole (PROTONIX) 40 MG EC tablet   prazosin (MINIPRESS) 1 MG capsule   promethazine (PHENERGAN) 6.25 MG/5ML syrup   rizatriptan (MAXALT-MLT) 5 MG ODT tab   sucralfate (CARAFATE) 1 GM tablet   varenicline (CHANTIX STARTING MONTH ILAN) 0.5 MG X 11 & 1 MG X 42 tablet         Review of Systems   All other systems reviewed and are negative.      Physical Exam   BP: (!) 133/94  Heart Rate: 110  Temp: 98.9  F (37.2  C)  Resp: 16  Weight: 75.8 kg (167 lb 3.2 oz)  SpO2: 99 %      Physical Exam   Constitutional: She is oriented to person, place, and time. She appears well-developed and well-nourished. No distress.   HENT:   Head: Normocephalic and atraumatic.   Mouth/Throat: Oropharynx is clear and moist. No oropharyngeal exudate.   Eyes: Pupils are equal, round, and reactive to light. No scleral icterus.   Neck: Normal range of motion. Neck supple.   Cardiovascular: Regular rhythm, normal heart sounds and intact distal pulses.  Tachycardia present.    Pulmonary/Chest: Effort normal and breath sounds normal. No respiratory distress.   Abdominal: Soft. Bowel sounds are normal.  There is no tenderness.   Musculoskeletal: Normal range of motion. She exhibits no edema or tenderness.   Neurological: She is alert and oriented to person, place, and time.   Skin: Skin is warm and dry. No rash noted. She is not diaphoretic.   Psychiatric: She has a normal mood and affect. Her behavior is normal. Judgment normal.   Nursing note and vitals reviewed.      ED Course     ED Course     Procedures               Critical Care time:  none               Results for orders placed or performed during the hospital encounter of 08/20/18 (from the past 24 hour(s))   CBC with platelets differential   Result Value Ref Range    WBC 5.7 4.0 - 11.0 10e9/L    RBC Count 3.99 3.8 - 5.2 10e12/L    Hemoglobin 13.6 11.7 - 15.7 g/dL    Hematocrit 38.4 35.0 - 47.0 %    MCV 96 78 - 100 fl    MCH 34.1 (H) 26.5 - 33.0 pg    MCHC 35.4 31.5 - 36.5 g/dL    RDW 11.9 10.0 - 15.0 %    Platelet Count 341 150 - 450 10e9/L    Diff Method Automated Method     % Neutrophils 65.3 %    % Lymphocytes 25.0 %    % Monocytes 8.0 %    % Eosinophils 1.2 %    % Basophils 0.3 %    % Immature Granulocytes 0.2 %    Nucleated RBCs 0 0 /100    Absolute Neutrophil 3.7 1.6 - 8.3 10e9/L    Absolute Lymphocytes 1.4 0.8 - 5.3 10e9/L    Absolute Monocytes 0.5 0.0 - 1.3 10e9/L    Absolute Basophils 0.0 0.0 - 0.2 10e9/L    Abs Immature Granulocytes 0.0 0 - 0.4 10e9/L    Absolute Nucleated RBC 0.0    Basic metabolic panel   Result Value Ref Range    Sodium 142 133 - 144 mmol/L    Potassium 4.0 3.4 - 5.3 mmol/L    Chloride 108 94 - 109 mmol/L    Carbon Dioxide 26 20 - 32 mmol/L    Anion Gap 8 3 - 14 mmol/L    Glucose 110 (H) 70 - 99 mg/dL    Urea Nitrogen 15 7 - 30 mg/dL    Creatinine 0.85 0.52 - 1.04 mg/dL    GFR Estimate 79 >60 mL/min/1.7m2    GFR Estimate If Black >90 >60 mL/min/1.7m2    Calcium 9.5 8.5 - 10.1 mg/dL     *Note: Due to a large number of results and/or encounters for the requested time period, some results have not been displayed. A complete set  of results can be found in Results Review.       Medications   ondansetron (ZOFRAN) injection 4 mg (4 mg Intravenous Given 8/20/18 1921)   acetaminophen (TYLENOL) tablet 1,000 mg (1,000 mg Oral Not Given 8/20/18 1822)   0.9% sodium chloride BOLUS (0 mLs Intravenous Stopped 8/20/18 1951)   diphenhydrAMINE (BENADRYL) injection 50 mg (50 mg Intravenous Given 8/20/18 1843)       Assessments & Plan (with Medical Decision Making)  28-year-old female who presents with a history recently of diarrhea, exposure to C. difficile and history of C. Difficile with some abdominal discomfort as well.  Will send stool for C. difficile toxin.  She did prefer to go ahead and initiate oral vancomycin which has been prescribed.  She should follow-up through primary care.  Return sooner if condition worsens.     I have reviewed the nursing notes.    I have reviewed the findings, diagnosis, plan and need for follow up with the patient.       Discharge Medication List as of 8/20/2018  7:51 PM      START taking these medications    Details   vancomycin (VANCOCIN HCL) 125 MG capsule Take 1 capsule (125 mg) by mouth 4 times daily for 10 days, Disp-40 capsule, R-0, E-Prescribe             Final diagnoses:   Diarrhea, unspecified type     This document serves as a record of services personally performed by Juan Carlos Del Toro MD. It was created on their behalf by Galina Tenorio, a trained medical scribe. The creation of this record is based on the provider's personal observations and the statements of the patient. This document has been checked and approved by the attending provider.    Note: Chart documentation done in part with Dragon Voice Recognition software. Although reviewed after completion, some word and grammatical errors may remain.    8/20/2018   [facility] EMERGENCY DEPARTMENT     Juan Carlos Del Toro MD  08/20/18 [id number]

## 2018-08-20 NOTE — DISCHARGE INSTRUCTIONS
Diarrhea with Uncertain Cause (Adult)    Diarrhea is when stools are loose and watery. This can be caused by:    Viral infections    Bacterial infections    Food poisoning    Parasites    Irritable bowel syndrome (IBS)    Inflammatory bowel diseases such as ulcerative colitis, Crohn's disease, and celiac disease    Food intolerance, such as to lactose, the sugar found in milk and milk products    Reaction to medicines like antibiotics, laxatives, cancer drugs, and antacids  Along with diarrhea, you may also have:    Abdominal pain and cramping    Nausea and vomiting    Loss of bowel control    Fever and chills    Bloody stools  In some cases, antibiotics may help to treat diarrhea. You may have a stool sample test. This is done to see what is causing your diarrhea, and if antibiotics will help treat it. The results of a stool sample test may take up to 2 days. The healthcare provider may not give you antibiotics until he or she has the stool test results.  Diarrhea can cause dehydration. This is the loss of too much water and other fluids from the body. When this occurs, body fluid must be replaced. This can be done with oral rehydration solutions. Oral rehydration solutions are available at drugstores and grocery stores without a prescription.  Home care  Follow all instructions given by your healthcare provider. Rest at home for the next 24 hours, or until you feel better. Avoid caffeine, tobacco, and alcohol. These can make diarrhea, cramping, and pain worse.  If taking medicines:    Don t take over-the-counter diarrhea or nausea medicines unless your healthcare provider tells you to.    You may use acetaminophen or NSAID medicines like ibuprofen or naproxen to reduce pain and fever. Don t use these if you have chronic liver or kidney disease, or ever had a stomach ulcer or gastrointestinal bleeding. Don't use NSAID medicines if you are already taking one for another condition (like arthritis) or are on daily  aspirin therapy (such as for heart disease or after a stroke). Talk with your healthcare provider first.    If antibiotics were prescribed, be sure you take them until they are finished. Don t stop taking them even when you feel better. Antibiotics must be taken as a full course.  To prevent the spread of illness:    Remember that washing with soap and water and using alcohol-based  is the best way to prevent the spread of infection.    Clean the toilet after each use.    Wash your hands before eating.    Wash your hands before and after preparing food. Keep in mind that people with diarrhea or vomiting should not prepare food for others.    Wash your hands after using cutting boards, countertops, and knives that have been in contact with raw foods.    Wash and then peel fruits and vegetables.    Keep uncooked meats away from cooked and ready-to-eat foods.    Use a food thermometer when cooking. Cook poultry to at least 165 F (74 C). Cook ground meat (beef, veal, pork, lamb) to at least 160 F (71 C). Cook fresh beef, veal, lamb, and pork to at least 145 F (63 C).    Don t eat raw or undercooked eggs (poached or glenys side up), poultry, meat, or unpasteurized milk and juices.  Food and drinks  The main goal while treating vomiting or diarrhea is to prevent dehydration. This is done by taking small amounts of liquids often.    Keep in mind that liquids are more important than food right now.    Drink only small amounts of liquids at a time.    Don t force yourself to eat, especially if you are having cramping, vomiting, or diarrhea. Don t eat large amounts at a time, even if you are hungry.    If you eat, avoid fatty, greasy, spicy, or fried foods.    Don t eat dairy foods or drink milk if you have diarrhea. These can make diarrhea worse.  During the first 24 hours you can try:    Oral rehydration solutions. Do not use sports drinks. They have too much sugar and not enough electrolytes.    Soft drinks without  caffeine    Ginger ale    Water (plain or flavored)    Decaf tea or coffee    Clear broth, consommé, or bouillon    Gelatin, popsicles, or frozen fruit juice bars  The second 24 hours, if you are feeling better, you can add:    Hot cereal, plain toast, bread, rolls, or crackers    Plain noodles, rice, mashed potatoes, chicken noodle soup, or rice soup    Unsweetened canned fruit (no pineapple)    Bananas  As you recover:    Limit fat intake to less than 15 grams per day. Don t eat margarine, butter, oils, mayonnaise, sauces, gravies, fried foods, peanut butter, meat, poultry, or fish.    Limit fiber. Don t eat raw or cooked vegetables, fresh fruits except bananas, or bran cereals.    Limit caffeine and chocolate.    Limit dairy.    Don t use spices or seasonings except salt.    Go back to your normal diet over time, as you feel better and your symptoms improve.    If the symptoms come back, go back to a simple diet or clear liquids.  Follow-up care  Follow up with your healthcare provider, or as advised. If a stool sample was taken or cultures were done, call the healthcare provider for the results as instructed.  Call 911  Call 911 if you have any of these symptoms:    Trouble breathing    Confusion    Extreme drowsiness or trouble walking    Loss of consciousness    Rapid heart rate    Chest pain    Stiff neck    Seizure  When to seek medical advice  Call your healthcare provider right away if any of these occur:    Abdominal pain that gets worse    Constant lower right abdominal pain    Continued vomiting and inability to keep liquids down    Diarrhea more than 5 times a day    Blood in vomit or stool    Dark urine or no urine for 8 hours, dry mouth and tongue, tiredness, weakness, or dizziness    Drowsiness    New rash    You don t get better in 2 to 3 days    Fever of 100.4 F (38 C) or higher, or as directed by your healthcare provider  Date Last Reviewed: 1/3/2016    5867-6827 [employer] [address]  [place] [address]. All rights reserved. This information is not intended as a substitute for professional medical care. Always follow your healthcare professional's instructions.

## 2018-08-21 ENCOUNTER — OFFICE VISIT (OUTPATIENT)
Dept: FAMILY MEDICINE | Facility: OTHER | Age: 28
End: 2018-08-21
Payer: MEDICARE

## 2018-08-21 ENCOUNTER — ALLIED HEALTH/NURSE VISIT (OUTPATIENT)
Dept: FAMILY MEDICINE | Facility: OTHER | Age: 28
End: 2018-08-21
Payer: MEDICARE

## 2018-08-21 VITALS
TEMPERATURE: 98.4 F | BODY MASS INDEX: 30.91 KG/M2 | HEART RATE: 96 BPM | WEIGHT: 169 LBS | SYSTOLIC BLOOD PRESSURE: 114 MMHG | DIASTOLIC BLOOD PRESSURE: 78 MMHG | RESPIRATION RATE: 16 BRPM | OXYGEN SATURATION: 99 %

## 2018-08-21 DIAGNOSIS — M54.42 CHRONIC BILATERAL LOW BACK PAIN WITH LEFT-SIDED SCIATICA: ICD-10-CM

## 2018-08-21 DIAGNOSIS — S90.415A ABRASION OF TOE OF LEFT FOOT, INITIAL ENCOUNTER: ICD-10-CM

## 2018-08-21 DIAGNOSIS — F41.9 ANXIETY: ICD-10-CM

## 2018-08-21 DIAGNOSIS — G40.409 GRAND MAL SEIZURE DISORDER (H): ICD-10-CM

## 2018-08-21 DIAGNOSIS — F32.0 MILD MAJOR DEPRESSION (H): ICD-10-CM

## 2018-08-21 DIAGNOSIS — F17.200 TOBACCO USE DISORDER: ICD-10-CM

## 2018-08-21 DIAGNOSIS — G89.29 CHRONIC BILATERAL LOW BACK PAIN WITH LEFT-SIDED SCIATICA: ICD-10-CM

## 2018-08-21 DIAGNOSIS — R11.2 NAUSEA AND VOMITING, INTRACTABILITY OF VOMITING NOT SPECIFIED, UNSPECIFIED VOMITING TYPE: Primary | ICD-10-CM

## 2018-08-21 DIAGNOSIS — R11.0 NAUSEA: Primary | ICD-10-CM

## 2018-08-21 PROCEDURE — 99207 ZZC NO CHARGE NURSE ONLY: CPT

## 2018-08-21 PROCEDURE — 99214 OFFICE O/P EST MOD 30 MIN: CPT | Performed by: PHYSICIAN ASSISTANT

## 2018-08-21 PROCEDURE — T1013 SIGN LANG/ORAL INTERPRETER: HCPCS | Mod: U3 | Performed by: PHYSICIAN ASSISTANT

## 2018-08-21 RX ORDER — ONDANSETRON 4 MG/1
4 TABLET, FILM COATED ORAL ONCE
Qty: 1 TABLET | Refills: 0 | OUTPATIENT
Start: 2018-08-21 | End: 2018-08-21

## 2018-08-21 RX ORDER — ONDANSETRON 2 MG/ML
8 INJECTION INTRAMUSCULAR; INTRAVENOUS ONCE
Qty: 4 ML | Refills: 0 | OUTPATIENT
Start: 2018-08-21 | End: 2018-08-21

## 2018-08-21 RX ORDER — NAPROXEN 500 MG/1
500 TABLET ORAL 2 TIMES DAILY WITH MEALS
Qty: 60 TABLET | Refills: 1 | Status: SHIPPED | OUTPATIENT
Start: 2018-08-21 | End: 2018-11-26

## 2018-08-21 RX ORDER — NYSTATIN 100000 U/G
CREAM TOPICAL
COMMUNITY
End: 2019-09-18

## 2018-08-21 RX ORDER — HYDROCODONE BITARTRATE AND ACETAMINOPHEN 5; 325 MG/1; MG/1
TABLET ORAL
COMMUNITY
End: 2019-01-04

## 2018-08-21 ASSESSMENT — PAIN SCALES - GENERAL: PAINLEVEL: MODERATE PAIN (5)

## 2018-08-21 NOTE — PATIENT INSTRUCTIONS
[facility] [place] [phone]   [email or web address]

## 2018-08-21 NOTE — NURSING NOTE
Per CDEMILY and Ines REDDY  MEDICATION: Zofran  ROUTE: IM  SITE: Ventrogluteal - Right & Left  DOSE: 8mg  LOT #: [id number]  :  [employer]   EXPIRATION DATE:  01/2019  NDC#: 91940-116-67     Jenni Luo MA

## 2018-08-21 NOTE — MR AVS SNAPSHOT
After Visit Summary   8/21/2018    Katy Islas    MRN: [id number]           Patient Information     Date Of Birth          1990        Visit Information        Provider Department      8/21/2018 8:30 AM Dayana Roldan; Aura Rosario, [facility]        Today's Diagnoses     Upper GI bleed - suspected    -  1    Nausea        Grand mal seizure disorder (H)        Chronic bilateral low back pain with left-sided sciatica          Care Instructions    [facility] [place] [phone]   [email or web address]                Follow-ups after your visit        Future tests that were ordered for you today     Open Future Orders        Priority Expected Expires Ordered    Clostridium difficile toxin B Routine  9/19/2018 8/20/2018            Who to contact     If you have questions or need follow up information about today's clinic visit or your schedule please contact [facility] directly at [phone].  Normal or non-critical lab and imaging results will be communicated to you by [employer], letter or phone within 4 business days after the clinic has received the results. If you do not hear from us within 7 days, please contact the clinic through [employer] or phone. If you have a critical or abnormal lab result, we will notify you by phone as soon as possible.  Submit refill requests through [employer] or call your pharmacy and they will forward the refill request to us. Please allow 3 business days for your refill to be completed.          Additional Information About Your Visit        [employer] Information     [employer] gives you secure access to your electronic health record. If you see a primary care provider, you can also send messages to your care team and make appointments. If you have questions, please call your primary care clinic.  If you do not have a primary care provider, please call [phone] and they  will assist you.        Care EveryWhere ID     This is your Care EveryWhere ID. This could be used by other organizations to access your [facility] medical records  [id number]        Your Vitals Were     Pulse Temperature Respirations Last Period Pulse Oximetry BMI (Body Mass Index)    96 98.4  F (36.9  C) (Temporal) 16 08/07/2018 (Exact Date) 99% 30.91 kg/m2       Blood Pressure from Last 3 Encounters:   08/21/18 114/78   08/20/18 114/75   08/10/18 112/70    Weight from Last 3 Encounters:   08/21/18 169 lb (76.7 kg)   08/20/18 167 lb 3.2 oz (75.8 kg)   08/10/18 176 lb (79.8 kg)              Today, you had the following     No orders found for display         Today's Medication Changes          These changes are accurate as of 8/21/18  9:28 AM.  If you have any questions, ask your nurse or doctor.               Start taking these medicines.        Dose/Directions    lidocaine (viscous) 15 mL, alum & mag hydroxide-simethicone 15 mL GI Cocktail   Used for:  Upper GI bleed   Started by:  Aura Rosario PA-C        Dose:  30 mL   Take 30 mLs by mouth once for 1 dose   Quantity:  30 mL   Refills:  0       ondansetron 4 MG tablet   Commonly known as:  ZOFRAN   Used for:  Upper GI bleed, Nausea   Started by:  Aura Rosario PA-C        Dose:  4 mg   Take 1 tablet (4 mg) by mouth once for 1 dose   Quantity:  1 tablet   Refills:  0         These medicines have changed or have updated prescriptions.        Dose/Directions    pantoprazole 40 MG EC tablet   Commonly known as:  PROTONIX   This may have changed:    - when to take this  - additional instructions   Used for:  Gastroesophageal reflux disease without esophagitis        Dose:  40 mg   Take 1 tablet (40 mg) by mouth daily Take 30-60 minutes before a meal.   Quantity:  30 tablet   Refills:  3            Where to get your medicines      [facility] [address] [place], [place] - [facility] [address] [facility]  [address]  [address] [facility] [address]     Phone:  [phone]     naproxen 500 MG tablet         Some of these will need a paper prescription and others can be bought over the counter.  Ask your nurse if you have questions.     You don't need a prescription for these medications     lidocaine (viscous) 15 mL, alum & mag hydroxide-simethicone 15 mL GI Cocktail    ondansetron 4 MG tablet               Information about OPIOIDS     PRESCRIPTION OPIOIDS: WHAT YOU NEED TO KNOW   We gave you an opioid (narcotic) pain medicine. It is important to manage your pain, but opioids are not always the best choice. You should first try all the other options your care team gave you. Take this medicine for as short a time (and as few doses) as possible.    Some activities can increase your pain, such as bandage changes or therapy sessions. It may help to take your pain medicine 30 to 60 minutes before these activities. Reduce your stress by getting enough sleep, working on hobbies you enjoy and practicing relaxation or meditation. Talk to your care team about ways to manage your pain beyond prescription opioids.    These medicines have risks:    DO NOT drive when on new or higher doses of pain medicine. These medicines can affect your alertness and reaction times, and you could be arrested for driving under the influence (DUI). If you need to use opioids long-term, talk to your care team about driving.    DO NOT operate heavy machinery    DO NOT do any other dangerous activities while taking these medicines.    DO NOT drink any alcohol while taking these medicines.     If the opioid prescribed includes acetaminophen, DO NOT take with any other medicines that contain acetaminophen. Read all labels carefully. Look for the word  acetaminophen  or  Tylenol.  Ask your pharmacist if you have questions or are unsure.    You can get addicted to pain medicines, especially if you have a history of  addiction (chemical, alcohol or substance dependence). Talk to your care team about ways to reduce this risk.    All opioids tend to cause constipation. Drink plenty of water and eat foods that have a lot of fiber, such as fruits, vegetables, prune juice, apple juice and high-fiber cereal. Take a laxative (Miralax, milk of magnesia, Colace, Senna) if you don t move your bowels at least every other day. Other side effects include upset stomach, sleepiness, dizziness, throwing up, tolerance (needing more of the medicine to have the same effect), physical dependence and slowed breathing.    Store your pills in a secure place, locked if possible. We will not replace any lost or stolen medicine. If you don t finish your medicine, please throw away (dispose) as directed by your pharmacist. The [place] [employer] has more information about safe disposal: [email or web address]         Primary Care Provider Office Phone # Fax #    Aura Rosario PA-C [phone]       [address] [place] [address]        Equal Access to Services     PRATIK VILLANUEVA : Hadii aad ku hadasho Sojannetteali, waaxda luqadaha, qaybta kaalmada adefloydyada, susan santo . So [facility] [phone].    ATENCIÓN: Si habla español, tiene a kerr disposición servicios gratuitos de asistencia lingüística. Llame al [phone].    We comply with applicable federal civil rights laws and [place] laws. We do not discriminate on the basis of race, color, national origin, age, disability, sex, sexual orientation, or gender identity.            Thank you!     Thank you for choosing [facility]  for your care. Our goal is always to provide you with excellent care. Hearing back from our patients is one way we can continue to improve our services. Please take a few minutes to complete the written survey that you may receive in the  mail after your visit with us. Thank you!             Your Updated Medication List - Protect others around you: Learn how to safely use, store and throw away your medicines at www.disposemymeds.org.          This list is accurate as of 8/21/18  9:28 AM.  Always use your most recent med list.                   Brand Name Dispense Instructions for use Diagnosis    * albuterol 108 (90 Base) MCG/ACT inhaler    PROAIR HFA/PROVENTIL HFA/VENTOLIN HFA    3 Inhaler    Inhale 2 puffs into the lungs every 6 hours as needed for shortness of breath / dyspnea or wheezing    Mild persistent asthma without complication       * albuterol (2.5 MG/3ML) 0.083% neb solution     50 vial    Take 1 vial (2.5 mg) by nebulization every 6 hours as needed for shortness of breath / dyspnea or wheezing    Mild persistent asthma without complication       ARIPiprazole 15 MG tablet    ABILIFY    90 tablet    Take 1 tablet (15 mg) by mouth At Bedtime    Adjustment disorder with mixed anxiety and depressed mood, Anxiety       cetirizine 10 MG tablet    zyrTEC    90 tablet    Take 1 tablet (10 mg) by mouth daily    Mild persistent asthma without complication       cyclobenzaprine 10 MG tablet    FLEXERIL    60 tablet    Take 1 tablet (10 mg) by mouth 2 times daily as needed for muscle spasms or other (back pain)    Chronic bilateral low back pain with left-sided sciatica       dexlansoprazole 60 MG Cpdr CR capsule    DEXILANT    90 capsule    Take 1 capsule (60 mg) by mouth daily    Gastroesophageal reflux disease without esophagitis       diazepam 10 MG tablet    VALIUM    60 tablet    Take 1 tablet (10 mg) by mouth every 12 hours as needed for anxiety or sleep (MUSCLE SPASM) (one month supply)    Chronic pain syndrome, Chronic bilateral low back pain with left-sided sciatica, Lumbar disc disease with radiculopathy, S/P lumbar fusion       dicyclomine 20 MG tablet    BENTYL    60 tablet    Take 1 tablet (20 mg) by mouth 4 times daily as needed  (ABDOMINAL CRAMPING) AS NEEDED FOR CRAMPING    Hx of Crohn's disease       DULoxetine 60 MG EC capsule    CYMBALTA    90 capsule    Take 1 capsule (60 mg) by mouth daily    Adjustment disorder with mixed anxiety and depressed mood       EPINEPHrine 0.3 MG/0.3ML injection 2-pack    EPIPEN/ADRENACLICK/or ANY BX GENERIC EQUIV    0.6 mL    Inject 0.3 mLs (0.3 mg) into the muscle once as needed for anaphylaxis    Bee sting allergy       gabapentin 300 MG capsule    NEURONTIN    120 capsule    Take 1-3 capsules (300-900 mg) by mouth 3 times daily    Chronic bilateral low back pain with left-sided sciatica       HYDROcodone-acetaminophen 5-325 MG per tablet    NORCO     Take 1 tablet every 6 hours by oral route as needed for 15 days.        lidocaine (viscous) 15 mL, alum & mag hydroxide-simethicone 15 mL GI Cocktail     30 mL    Take 30 mLs by mouth once for 1 dose    Upper GI bleed       LORazepam 0.5 MG tablet    ATIVAN     Take 0.5 mg by mouth 2 times daily        meclizine 12.5 MG tablet    ANTIVERT    30 tablet    Take 1 tablet (12.5 mg) by mouth 4 times daily as needed for dizziness    Dizziness       medical cannabis inhalation (Patient's own supply.  Not a prescription)      Inhale into the lungs 3 times daily as needed Reported on 3/28/2017        medroxyPROGESTERone 150 MG/ML injection    DEPO-PROVERA    3 mL    Inject 1 mL (150 mg) into the muscle every 3 months    Encounter for initial prescription of injectable contraceptive       naproxen 500 MG tablet    NAPROSYN    60 tablet    Take 1 tablet (500 mg) by mouth 2 times daily (with meals)    Chronic bilateral low back pain with left-sided sciatica       NONFORMULARY      Apply 30 mLs topically 4 times daily        nystatin cream    MYCOSTATIN     nystatin 100,000 unit/gram topical cream        ondansetron 4 MG tablet    ZOFRAN    1 tablet    Take 1 tablet (4 mg) by mouth once for 1 dose    Upper GI bleed, Nausea       * oxyCODONE-acetaminophen 5-325 MG per  tablet    PERCOCET     oxycodone-acetaminophen 5 mg-325 mg tablet        * oxyCODONE-acetaminophen 5-325 MG per tablet    PERCOCET     Take 1 tablet every 8 hours by oral route as needed.        pantoprazole 40 MG EC tablet    PROTONIX    30 tablet    Take 1 tablet (40 mg) by mouth daily Take 30-60 minutes before a meal.    Gastroesophageal reflux disease without esophagitis       prazosin 1 MG capsule    MINIPRESS     Take 1 mg by mouth At Bedtime        promethazine 6.25 MG/5ML syrup    PHENERGAN    560 mL    Take 10 mLs (12.5 mg) by mouth 4 times daily as needed for nausea    Nausea       rizatriptan 5 MG ODT tab    MAXALT-MLT    18 tablet    Take 1-2 tablets (5-10 mg) by mouth at onset of headache for migraine May repeat in 2 hours. Max 6 tablets/24 hours.    Migraine with aura and without status migrainosus, not intractable       sucralfate 1 GM tablet    CARAFATE    60 tablet    Take 1 tablet (1 g) by mouth 4 times daily as needed    Gastroesophageal reflux disease without esophagitis       vancomycin 125 MG capsule    VANCOCIN HCL    40 capsule    Take 1 capsule (125 mg) by mouth 4 times daily        varenicline 0.5 MG X 11 & 1 MG X 42 tablet    CHANTIX STARTING MONTH ILAN    53 tablet    Take 0.5 mg tab daily for 3 days, then 0.5 mg tab twice daily for 4 days, then 1 mg twice daily.    Tobacco use disorder       * Notice:  This list has 4 medication(s) that are the same as other medications prescribed for you. Read the directions carefully, and ask your doctor or other care provider to review them with you.

## 2018-08-21 NOTE — NURSING NOTE
The following medication was given:     MEDICATION: Ondansetron Orally Disintegrating Tablets 4mg  ROUTE: PO  SITE: mouth  DOSE: 4mg  LOT #: [id number]  :  hemant    EXPIRATION DATE:  09/2018  NDC: [id number]      Jenni Luo MA

## 2018-08-21 NOTE — MR AVS SNAPSHOT
After Visit Summary   8/21/2018    Katy Islas    MRN: [id number]           Patient Information     Date Of Birth          1990        Visit Information        Provider Department      8/21/2018 1:30 PM STEVO GONZALEZ TEAM B, [facility]        Today's Diagnoses     Nausea and vomiting, intractability of vomiting not specified, unspecified vomiting type    -  1       Follow-ups after your visit        Who to contact     If you have questions or need follow up information about today's clinic visit or your schedule please contact [facility] directly at [phone].  Normal or non-critical lab and imaging results will be communicated to you by [email or web address], letter or phone within 4 business days after the clinic has received the results. If you do not hear from us within 7 days, please contact the clinic through Facebookt or phone. If you have a critical or abnormal lab result, we will notify you by phone as soon as possible.  Submit refill requests through [employer] or call your pharmacy and they will forward the refill request to us. Please allow 3 business days for your refill to be completed.          Additional Information About Your Visit        MyChart Information     [employer] gives you secure access to your electronic health record. If you see a primary care provider, you can also send messages to your care team and make appointments. If you have questions, please call your primary care clinic.  If you do not have a primary care provider, please call [phone] and they will assist you.        Care EveryWhere ID     This is your Care EveryWhere ID. This could be used by other organizations to access your [facility] medical records  [id number]        Your Vitals Were     Last Period                   08/07/2018 (Exact Date)            Blood Pressure from Last 3 Encounters:   09/05/18 100/64   08/27/18 112/78   08/21/18 114/78    Weight from Last 3 Encounters:    09/05/18 167 lb (75.8 kg)   08/27/18 (P) 169 lb (76.7 kg)   08/21/18 169 lb (76.7 kg)              Today, you had the following     No orders found for display         Today's Medication Changes          These changes are accurate as of 8/21/18 11:59 PM.  If you have any questions, ask your nurse or doctor.               Start taking these medicines.        Dose/Directions    * lidocaine (viscous) 15 mL, alum & mag hydroxide-simethicone 15 mL GI Cocktail   Used for:  Nausea   Started by:  Aura Rosario PA-C        Dose:  30 mL   Take 30 mLs by mouth once for 1 dose   Quantity:  30 mL   Refills:  0       * lidocaine (viscous) 15 mL, alum & mag hydroxide-simethicone 15 mL GI Cocktail   Used for:  Nausea and vomiting, intractability of vomiting not specified, unspecified vomiting type        Dose:  30 mL   Take 30 mLs by mouth once for 1 dose   Quantity:  30 mL   Refills:  0       * ondansetron 4 MG tablet   Commonly known as:  ZOFRAN   Used for:  Nausea   Started by:  Aura Rosario PA-C        Dose:  4 mg   Take 1 tablet (4 mg) by mouth once for 1 dose   Quantity:  1 tablet   Refills:  0       * ondansetron 2 MG/ML Soln injection   Commonly known as:  ZOFRAN   Used for:  Nausea and vomiting, intractability of vomiting not specified, unspecified vomiting type        Dose:  8 mg   Inject 4 mLs (8 mg) into the vein once for 1 dose   Quantity:  4 mL   Refills:  0       * Notice:  This list has 4 medication(s) that are the same as other medications prescribed for you. Read the directions carefully, and ask your doctor or other care provider to review them with you.      These medicines have changed or have updated prescriptions.        Dose/Directions    pantoprazole 40 MG EC tablet   Commonly known as:  PROTONIX   This may have changed:    - when to take this  - additional instructions   Used for:  Gastroesophageal reflux disease without esophagitis        Dose:  40 mg   Take 1 tablet  (40 mg) by mouth daily Take 30-60 minutes before a meal.   Quantity:  30 tablet   Refills:  3            Where to get your medicines      These medications were sent to [facility] [id number] [place] [address] [address] [facility] [address]  [address] [facility] [address]     Phone:  [phone]     naproxen 500 MG tablet         Some of these will need a paper prescription and others can be bought over the counter.  Ask your nurse if you have questions.     You don't need a prescription for these medications     lidocaine (viscous) 15 mL, alum & mag hydroxide-simethicone 15 mL GI Cocktail    lidocaine (viscous) 15 mL, alum & mag hydroxide-simethicone 15 mL GI Cocktail    ondansetron 2 MG/ML Soln injection    ondansetron 4 MG tablet                Primary Care Provider Office Phone # Fax #    Aura EMILY Rosario PA-C [phone]       [address] [place] [address] [address]  [facility] [address]        Equal Access to Services     [facility] : Hadii aad ku hadasho Soomaali, waaxda luqadaha, qaybta kaalmada adeegyada, waxfernando santo . So [facility] [phone].    ATENCIÓN: Si habla español, tiene a kerr disposición servicios gratuitos de asistencia lingüística. [facility] [phone].    We comply with applicable federal civil rights laws and [place] laws. We do not discriminate on the basis of race, color, national origin, age, disability, sex, sexual orientation, or gender identity.            Thank you!     Thank you for choosing [facility]  for your care. Our goal is always to provide you with excellent care. Hearing back from our patients is one way we can continue to improve our services. Please take a few minutes to complete the written survey that you may receive in the mail after your visit with us. Thank you!             Your Updated Medication List - Protect others around you: Learn how to safely use, store and throw  away your medicines at www.disposemymeds.org.          This list is accurate as of 8/21/18 11:59 PM.  Always use your most recent med list.                   Brand Name Dispense Instructions for use Diagnosis    * albuterol 108 (90 Base) MCG/ACT inhaler    PROAIR HFA/PROVENTIL HFA/VENTOLIN HFA    3 Inhaler    Inhale 2 puffs into the lungs every 6 hours as needed for shortness of breath / dyspnea or wheezing    Mild persistent asthma without complication       * albuterol (2.5 MG/3ML) 0.083% neb solution     50 vial    Take 1 vial (2.5 mg) by nebulization every 6 hours as needed for shortness of breath / dyspnea or wheezing    Mild persistent asthma without complication       ARIPiprazole 15 MG tablet    ABILIFY    90 tablet    Take 1 tablet (15 mg) by mouth At Bedtime    Adjustment disorder with mixed anxiety and depressed mood, Anxiety       cetirizine 10 MG tablet    zyrTEC    90 tablet    Take 1 tablet (10 mg) by mouth daily    Mild persistent asthma without complication       cyclobenzaprine 10 MG tablet    FLEXERIL    60 tablet    Take 1 tablet (10 mg) by mouth 2 times daily as needed for muscle spasms or other (back pain)    Chronic bilateral low back pain with left-sided sciatica       dicyclomine 20 MG tablet    BENTYL    60 tablet    Take 1 tablet (20 mg) by mouth 4 times daily as needed (ABDOMINAL CRAMPING) AS NEEDED FOR CRAMPING    Hx of Crohn's disease       DULoxetine 60 MG EC capsule    CYMBALTA    90 capsule    Take 1 capsule (60 mg) by mouth daily    Adjustment disorder with mixed anxiety and depressed mood       EPINEPHrine 0.3 MG/0.3ML injection 2-pack    EPIPEN/ADRENACLICK/or ANY BX GENERIC EQUIV    0.6 mL    Inject 0.3 mLs (0.3 mg) into the muscle once as needed for anaphylaxis    Bee sting allergy       gabapentin 300 MG capsule    NEURONTIN    120 capsule    Take 1-3 capsules (300-900 mg) by mouth 3 times daily    Chronic bilateral low back pain with left-sided sciatica        HYDROcodone-acetaminophen 5-325 MG per tablet    NORCO     Take 1 tablet every 6 hours by oral route as needed for 15 days.    Nausea       * lidocaine (viscous) 15 mL, alum & mag hydroxide-simethicone 15 mL GI Cocktail     30 mL    Take 30 mLs by mouth once for 1 dose    Nausea       * lidocaine (viscous) 15 mL, alum & mag hydroxide-simethicone 15 mL GI Cocktail     30 mL    Take 30 mLs by mouth once for 1 dose    Nausea and vomiting, intractability of vomiting not specified, unspecified vomiting type       LORazepam 0.5 MG tablet    ATIVAN     Take 0.5 mg by mouth 2 times daily        medical cannabis inhalation (Patient's own supply.  Not a prescription)      Inhale into the lungs 3 times daily as needed Reported on 3/28/2017        medroxyPROGESTERone 150 MG/ML injection    DEPO-PROVERA    3 mL    Inject 1 mL (150 mg) into the muscle every 3 months    Encounter for initial prescription of injectable contraceptive       naproxen 500 MG tablet    NAPROSYN    60 tablet    Take 1 tablet (500 mg) by mouth 2 times daily (with meals)    Chronic bilateral low back pain with left-sided sciatica       NONFORMULARY      Apply 30 mLs topically 4 times daily        nystatin cream    MYCOSTATIN     nystatin 100,000 unit/gram topical cream    Nausea       * ondansetron 4 MG tablet    ZOFRAN    1 tablet    Take 1 tablet (4 mg) by mouth once for 1 dose    Nausea       * ondansetron 2 MG/ML Soln injection    ZOFRAN    4 mL    Inject 4 mLs (8 mg) into the vein once for 1 dose    Nausea and vomiting, intractability of vomiting not specified, unspecified vomiting type       pantoprazole 40 MG EC tablet    PROTONIX    30 tablet    Take 1 tablet (40 mg) by mouth daily Take 30-60 minutes before a meal.    Gastroesophageal reflux disease without esophagitis       prazosin 1 MG capsule    MINIPRESS     Take 1 mg by mouth At Bedtime        promethazine 6.25 MG/5ML syrup    PHENERGAN    560 mL    Take 10 mLs (12.5 mg) by mouth 4 times  daily as needed for nausea    Nausea       rizatriptan 5 MG ODT tab    MAXALT-MLT    18 tablet    Take 1-2 tablets (5-10 mg) by mouth at onset of headache for migraine May repeat in 2 hours. Max 6 tablets/24 hours.    Migraine with aura and without status migrainosus, not intractable       sucralfate 1 GM tablet    CARAFATE    60 tablet    Take 1 tablet (1 g) by mouth 4 times daily as needed    Gastroesophageal reflux disease without esophagitis       varenicline 0.5 MG X 11 & 1 MG X 42 tablet    CHANTIX STARTING MONTH ILAN    53 tablet    Take 0.5 mg tab daily for 3 days, then 0.5 mg tab twice daily for 4 days, then 1 mg twice daily.    Tobacco use disorder       * Notice:  This list has 6 medication(s) that are the same as other medications prescribed for you. Read the directions carefully, and ask your doctor or other care provider to review them with you.

## 2018-08-27 ENCOUNTER — TELEPHONE (OUTPATIENT)
Dept: FAMILY MEDICINE | Facility: OTHER | Age: 28
End: 2018-08-27

## 2018-08-27 ENCOUNTER — OFFICE VISIT (OUTPATIENT)
Dept: ORTHOPEDICS | Facility: OTHER | Age: 28
End: 2018-08-27
Payer: MEDICARE

## 2018-08-27 ENCOUNTER — OFFICE VISIT (OUTPATIENT)
Dept: AUDIOLOGY | Facility: OTHER | Age: 28
End: 2018-08-27
Payer: MEDICARE

## 2018-08-27 VITALS — SYSTOLIC BLOOD PRESSURE: 112 MMHG | DIASTOLIC BLOOD PRESSURE: 78 MMHG | HEIGHT: 61 IN

## 2018-08-27 DIAGNOSIS — M25.551 BILATERAL HIP PAIN: Primary | ICD-10-CM

## 2018-08-27 DIAGNOSIS — H90.3 SENSORINEURAL HEARING LOSS (SNHL) OF BOTH EARS: Primary | ICD-10-CM

## 2018-08-27 DIAGNOSIS — M25.552 BILATERAL HIP PAIN: Primary | ICD-10-CM

## 2018-08-27 PROCEDURE — T1013 SIGN LANG/ORAL INTERPRETER: HCPCS | Mod: U3 | Performed by: PHYSICAL MEDICINE & REHABILITATION

## 2018-08-27 PROCEDURE — 92593 HC HEARING AID CHECK, BINAURAL: CPT | Performed by: AUDIOLOGIST

## 2018-08-27 PROCEDURE — 99203 OFFICE O/P NEW LOW 30 MIN: CPT | Performed by: PHYSICAL MEDICINE & REHABILITATION

## 2018-08-27 PROCEDURE — 99207 ZZC NO CHARGE LOS: CPT | Performed by: AUDIOLOGIST

## 2018-08-27 NOTE — PROGRESS NOTES
Sports Medicine Clinic Visit    PCP: Aura Rosario    CC: Patient presents with:  Musculoskeletal Problem: left hip pain  Consult      HPI:  Katy Islas is a 28 year old female who is seen as a self referral.   She notes left hip {BSduration:304569} when she ***.   She rates the pain at a  {PAIN SCALE:426527} at its worst and a {PAIN SCALE:065391} currently.  Symptoms are relieved with {BS relievin}. Symptoms are worsened by {BSworsesymptoms:334247}. She endorses {BSadditionalfeatures:992100}.   She denies {BSadditionalfeatures:031843}.  Other treatment has included {BSothertreatments:867896}. She notes difficulty with ***.       Review of Systems:  Musculoskeletal: as above  Remainder of review of systems is negative including constitutional, eyes, ENT, CV, pulmonary, GI, , endocrine, skin, hematologic, and neurologic except as noted in HPI or medical history.    History reviewed. No pertinent past surgical/medical/family/social history other than as mentioned in HPI.    Patient Active Problem List   Diagnosis     Hearing loss     Allergic rhinitis     Migraine     Benign neoplasm of skin of lower limb, including hip     Dysmenorrhea     Crohn's colitis (H)     Mild major depression (H)     Anxiety     Chronic pain     Oral thrush     Nausea and vomiting     Grand mal seizure disorder (H)     Bipolar disorder (H)     Marijuana abuse     Adjustment disorder with mixed anxiety and depressed mood     Herpes simplex virus infection     Gastroesophageal reflux disease without esophagitis     Bee sting allergy     Mild persistent asthma without complication     Chronic bilateral low back pain with left-sided sciatica     Vitamin D deficiency     Atypical mole     Lumbar disc disease with radiculopathy     S/P lumbar fusion     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 difficile diarrhea     Past, no current diarrhea.     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, without mention of intractable epilepsy     pseudoseizures diagnosed after extensive neurologic eval     Migraine 07/21/12    D/C 07/22/12-[facility]     Migraine, unspecified, without mention of intractable migraine without mention of status migrainosus     Migraine     Mild intermittent asthma     mild intermittent     Mycoplasma infection in conditions classified elsewhere and of unspecified site      Other chronic pain     Back pain for 6 years     Renal disease     Kidney stones     Unspecified hearing loss     congenital hearing loss     Past Surgical History:   Procedure Laterality Date     COLONOSCOPY  7/1/2009    [facility].     FUSION SPINE POSTERIOR MINIMALLY INVASIVE ONE LEVEL N/A 2/23/2017    Procedure: FUSION SPINE POSTERIOR MINIMALLY INVASIVE ONE LEVEL;  L4-5 Oblique Lateral Lumbar Interbody Fusion   Epidural steroid injection.   Transpedicular Bone marrow aspiration;  Surgeon: Jeniffer Eugene MD;  Location:  OR      COLONOSCOPY THRU STOMA WITH BIOPSY  10/29/2003     Impression is that of normal appearing colonoscopy, without evidence of rectal bleeding.     HC COLONOSCOPY THRU STOMA, DIAGNOSTIC  10/00    normal     HC COLONOSCOPY THRU STOMA, DIAGNOSTIC  Oct 2009    Dr López- normal     HC EEG AWAKE AND SLEEP      abnormal     HC MRI BRAIN W/O CONTRAST  12/00    normal     HC REMOVAL GALLBLADDER  8/5/2009    [facility] [place]     HC UGI ENDOSCOPY DIAG W BIOPSY  11/11/09    Normal esophagus     HC UGI ENDOSCOPY, SIMPLE EXAM  7/00, 10/00    mild chronic esophagitis and duodenitis, neg H pylori     HC UGI ENDOSCOPY, SIMPLE EXAM  01/20/2005    Esophagogastroduodenoscopy, colonoscopy with biopsies.  [facility]     HC UGI ENDOSCOPY, SIMPLE EXAM  7/1/2009    [facility].      UGI ENDOSCOPY, SIMPLE EXAM  11/11/2009    attempted upper GI, pt. could not tolerate procedure:[facility]     ORTHOPEDIC SURGERY  October 19,2011    diskectomy L4-L5     Family History   Problem Relation Age of Onset     GASTROINTESTINAL DISEASE Brother      severe Crohn's     Neurologic Disorder Brother      Seizures post head injury     Depression Brother      Substance Abuse Brother      Genitourinary Problems Father      kidney stones     Diabetes Father      HEART DISEASE Father      Open heart surgery     Breast Cancer Maternal Grandmother      Parkinsonism Maternal Grandmother      Cerebrovascular Disease Paternal Grandmother      Cancer Maternal Grandfather      Lung     Cardiovascular Paternal Grandfather      Heart Attack     Substance Abuse Mother      Lung Cancer Paternal Uncle      Cancer Paternal Uncle      Lung Cancer Maternal Uncle      Social History     Social History     Marital status: Single     Spouse name: N/A     Number of children: N/A     Years of education: N/A     Occupational History     Not on file.     Social History Main Topics     Smoking status: Former Smoker     Packs/day: 0.50     Types: Cigarettes      Smokeless tobacco: Never Used      Comment: quit July 2018 - 4 days no cig     Alcohol use 0.6 oz/week     1 Cans of beer per week      Comment: once in while      Drug use: Yes     Special: Marijuana      Comment: Medical Marijuana currently     Sexual activity: Not Currently     Partners: Male     Birth control/ protection:      Other Topics Concern      Service No     Blood Transfusions No     Caffeine Concern No     Occupational Exposure No     Hobby Hazards No     Sleep Concern No     Stress Concern No     Weight Concern No     Special Diet No     Back Care No     Exercise Yes     Bike Helmet No     Seat Belt Yes     Self-Exams Yes     Parent/Sibling W/ Cabg, Mi Or Angioplasty Before 65f 55m? No     Social History Narrative       She {BSsocialhistory:828188}    Current Outpatient Prescriptions   Medication     albuterol (2.5 MG/3ML) 0.083% neb solution     albuterol (PROAIR HFA/PROVENTIL HFA/VENTOLIN HFA) 108 (90 Base) MCG/ACT Inhaler     ARIPiprazole (ABILIFY) 15 MG tablet     cetirizine (ZYRTEC) 10 MG tablet     cyclobenzaprine (FLEXERIL) 10 MG tablet     dicyclomine (BENTYL) 20 MG tablet     DULoxetine (CYMBALTA) 60 MG EC capsule     EPINEPHrine (EPIPEN/ADRENACLICK/OR ANY BX GENERIC EQUIV) 0.3 MG/0.3ML injection 2-pack     gabapentin (NEURONTIN) 300 MG capsule     HYDROcodone-acetaminophen (NORCO) 5-325 MG per tablet     LORazepam (ATIVAN) 0.5 MG tablet     medical cannabis inhalation (Patient's own supply.  Not a prescription)     medroxyPROGESTERone (DEPO-PROVERA) 150 MG/ML injection     naproxen (NAPROSYN) 500 MG tablet     NONFORMULARY     nystatin (MYCOSTATIN) cream     pantoprazole (PROTONIX) 40 MG EC tablet     prazosin (MINIPRESS) 1 MG capsule     promethazine (PHENERGAN) 6.25 MG/5ML syrup     rizatriptan (MAXALT-MLT) 5 MG ODT tab     sucralfate (CARAFATE) 1 GM tablet     vancomycin (VANCOCIN HCL) 125 MG capsule     varenicline (CHANTIX STARTING MONTH PAK) 0.5 MG X 11 & 1 MG X 42 tablet      No current facility-administered medications for this visit.      Allergies   Allergen Reactions     Ativan [Lorazepam] Hives     At the IV site     Baclofen      hives     Bees Hives, Swelling and Difficulty breathing     Caffeine      Contrast Dye      Hives,   Updated 5/10/2016 CT Contrast.     Iodine Hives     Methocarbamol Swelling     Metoclopramide      Other reaction(s): Tremors  LW Reaction: shaking/sweating     Midazolam      Other reaction(s): Agitation     Monosodium Glutamate      Morphine Other (See Comments)     Difficulty with urination     Nsaids Other (See Comments)     GI BLEED x2     Other (Do Not Use) Other (See Comments)     Xanaflex- pt becomes disoriented and loses bladder control     Reglan [Metoclopramide Hcl] Other (See Comments)     shaking     Soma [Carisoprodol] Visual Disturbance     Sleep walking     Tizanidine      Topamax Other (See Comments)     Topamax [Topiramate] Nausea     Tingling  GI/Vomit     Tramadol      Severe Headache, Seizure Risk     Tylenol W/Codeine [Acetaminophen-Codeine] Nausea and Itching     Tylenol 3     Valium Other (See Comments)     Becomes aggressive and angry     Versed Other (See Comments)     Zolmitriptan      Makes face feel like its twitching     Droperidol Anxiety     Flu Virus Vaccine Rash      Arm swelling         Objective:  [facility] 08/07/2018 (Exact Date)    General: Alert and in no distress    Head: Normocephalic, atraumatic  Eyes: no scleral icterus or conjunctival erythema   Oropharynx:  Mucous membranes moist  Skin: no erythema, petechiae, or jaundice  CV: regular rhythm by palpation, 2+ distal pulses  Resp: normal respiratory effort without conversational dyspnea   Psych: normal mood and affect    Gait: Non-antalgic, appropriate coordination and balance   Neuro: Motor strength and sensation as noted below    Musculoskeletal:      ***    Radiology:  Independent visualization of images performed.    {diagnostic yes/no:421704}  CT PELVIS WITHOUT  IV CONTRAST8/22/2018  [employer]  Result Narrative   EXAMINATION:  CT PELVIS:  08/22/2018     COMPARISON:  CT abdomen and pelvis 08/20/2015.      CLINICAL DATA:  Sacroiliac joint disorder.      FINDINGS:  There are postoperative changes from a posterior L4-5 fusion.    There is intervertebral body fusion at this level.  No evidence of   hardware complications.  Sacroiliac joints are symmetric and normal in   appearance.  No acute fractures or dislocations.  The uterus and bladder   are grossly normal.  No free fluid in the pelvis.      IMPRESSION:  Postoperative changes at L4-5, otherwise negative.         XR PELVIS ROUTINE 1 OR 2 VIEWS8/22/2018  [employer]  Result Narrative   EXAMINATION:  AP PELVIS:  08/22/2018     COMPARISON:  None.      CLINICAL DATA:  Sacroiliac joint disorder.      FINDINGS:  No acute fractures or dislocations. Hip joints are well   preserved.  Sacroiliac joints are symmetric and normal in appearance.    Postoperative changes from an L4-5 fusion.      IMPRESSION:  Postoperative changes from an L4-5 fusion.            Assessment:  No diagnosis found.    Plan:  Discussed the assessment with the patient and developed a plan together:  {[facility] Plan:301083}      Time spent in one-on-one evaluation and discussion with patient regarding nature of problem, course, prior treatments, and therapeutic options, at least 50% of which was spent in counseling and coordination of care:  *** minutes.      Leisa Grant MD, [facility] Sports Medicine  [facility]

## 2018-08-27 NOTE — PROGRESS NOTES
Sports Medicine Clinic Visit    PCP: Aura Rosario    CC: Patient presents with:  Musculoskeletal Problem: left hip pain  Consult      HPI:  Katy Islas is a 28 year old female who is seen in as a self referral.  About a month ago, she walked 5 miles and began to have bilateral anterior hip pain after.  She felt that the pain improved over the next couple of days. She has a history of bilateral SI joint pain and dysfunction.  She notes that her spine surgeon (Dr. Jeniffer Millan in [place] at [facility]) indicated evidence of hip joint narrowing and wanted her to be seen (as he doesn't take care of hips). She is going to be getting an SI joint fusion on the left side, then on the right side.  She is waiting for insurance approval so she is not sure of the date yet.  She notes bilateral anterior hip pain.  She rates the pain at a 7/10 at its worst and a 4/10 currently.  Symptoms are relieved with reclined sitting, ice and heat, and slightly with naproxen. Symptoms are worsened by prolonged sitting, activity, lying down, and riding a horse. She endorses numbness in both glutes with prolonged sitting, numbness and tingling down both legs, popping, and pinching.   She denies swelling, bruising, grinding, catching, locking and weakness.  Other treatment has included nothing. She notes difficulty with riding horse and hip abduction.   Of note, she had her tailbone was removed ~1 year ago (it was fractured, she attributes it to falling off her horse 7 years ago).    Review of Systems:  Musculoskeletal: as above  Remainder of review of systems is negative including constitutional, eyes, ENT, CV, pulmonary, GI, , endocrine, skin, hematologic, and neurologic except as noted in HPI or medical history.    History reviewed. No pertinent past surgical/medical/family/social history other than as mentioned in HPI.    Patient Active Problem List   Diagnosis     Hearing loss      Allergic rhinitis     Migraine     Benign neoplasm of skin of lower limb, including hip     Dysmenorrhea     Crohn's colitis (H)     Mild major depression (H)     Anxiety     Chronic pain     Oral thrush     Nausea and vomiting     Grand mal seizure disorder (H)     Bipolar disorder (H)     Marijuana abuse     Adjustment disorder with mixed anxiety and depressed mood     Herpes simplex virus infection     Gastroesophageal reflux disease without esophagitis     Bee sting allergy     Mild persistent asthma without complication     Chronic bilateral low back pain with left-sided sciatica     Vitamin D deficiency     Atypical mole     Lumbar disc disease with radiculopathy     S/P lumbar fusion     Requires teletypewr[employer] device for the deaf (TTD)     Upper GI bleed - suspected     Tobacco use disorder     History of pseudoseizure     Closed fracture of coccyx with nonunion, subsequent encounter     Postoperative pain     Iliotibial band syndrome affecting lower leg, right     Chondromalacia of right patellofemoral joint     Melanocytic nevus, unspecified location     Dysplastic skin lesion     Past Medical History:   Diagnosis Date     Abdominal pain 10/31- 11/4/2005    [facility] admit for Cro[facility]     Allergic rhinitis, cause unspecified     Allergic rhinitis     Arthritis      C. difficile diarrhea     Past, no current diarrhea.     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, without mention of intractable epilepsy     pseudoseizures diagnosed after extensive neurologic eval     Migraine 07/21/12    D/C 07/22/12Jose  Nicollet     Migraine, unspecified, without mention of intractable migraine without mention of status migrainosus     Migraine     Mild intermittent asthma     mild intermittent     Mycoplasma infection in conditions classified elsewhere and of unspecified site      Other chronic pain     Back pain for 6 years     Renal disease     Kidney stones     Unspecified hearing loss     congenital hearing loss     Past Surgical History:   Procedure Laterality Date     COLONOSCOPY  7/1/2009    [facility].     FUSION SPINE POSTERIOR MINIMALLY INVASIVE ONE LEVEL N/A 2/23/2017    Procedure: FUSION SPINE POSTERIOR MINIMALLY INVASIVE ONE LEVEL;  L4-5 Oblique Lateral Lumbar Interbody Fusion   Epidural steroid injection.   Transpedicular Bone marrow aspiration;  Surgeon: Jeniffer Eugene MD;  Location: [facility] OR     HC COLONOSCOPY THRU STOMA WITH BIOPSY  10/29/2003    Impression is that of normal appearing colonoscopy, without evidence of rectal bleeding.     HC COLONOSCOPY THRU STOMA, DIAGNOSTIC  10/00    normal     HC COLONOSCOPY THRU STOMA, DIAGNOSTIC  Oct 2009    Dr López- normal     HC EEG AWAKE AND SLEEP      abnormal     HC MRI BRAIN W/O CONTRAST  12/00    normal     HC REMOVAL GALLBLADDER  8/5/2009    [facility], [facility].     HC UGI ENDOSCOPY DIAG W BIOPSY  11/11/09    Normal esophagus     HC UGI ENDOSCOPY, SIMPLE EXAM  7/00, 10/00    mild chronic esophagitis and duodenitis, neg H pylori     HC UGI ENDOSCOPY, SIMPLE EXAM  01/20/2005    Esophagogastroduodenoscopy, colonoscopy with biopsies.  [facility] UGI ENDOSCOPY, SIMPLE EXAM  7/1/2009    [facility].      UGI ENDOSCOPY, SIMPLE EXAM  11/11/2009    attempted upper GI, pt. could not tolerate procedure:[facility]     ORTHOPEDIC SURGERY  October 19,2011    diskectomy L4-L5     Family History   Problem Relation Age of Onset     GASTROINTESTINAL DISEASE Brother      severe Crohn's      Neurologic Disorder Brother      Seizures post head injury     Depression Brother      Substance Abuse Brother      Genitourinary Problems Father      kidney stones     Diabetes Father      HEART DISEASE Father      Open heart surgery     Breast Cancer Maternal Grandmother      Parkinsonism Maternal Grandmother      Cerebrovascular Disease Paternal Grandmother      Cancer Maternal Grandfather      Lung     Cardiovascular Paternal Grandfather      Heart Attack     Substance Abuse Mother      Lung Cancer Paternal Uncle      Cancer Paternal Uncle      Lung Cancer Maternal Uncle      Social History     Social History     Marital status: Single     Spouse name: N/A     Number of children: N/A     Years of education: N/A     Occupational History     Not on file.     Social History Main Topics     Smoking status: Former Smoker     Packs/day: 0.50     Types: Cigarettes     Smokeless tobacco: Never Used      Comment: quit July 2018 - 4 days no cig     Alcohol use 0.6 oz/week     1 Cans of beer per week      Comment: once in while      Drug use: Yes     Special: Marijuana      Comment: Medical Marijuana currently     Sexual activity: Not Currently     Partners: Male     Birth control/ protection:      Other Topics Concern      Service No     Blood Transfusions No     Caffeine Concern No     Occupational Exposure No     Hobby Hazards No     Sleep Concern No     Stress Concern No     Weight Concern No     Special Diet No     Back Care No     Exercise Yes     Bike Helmet No     Seat Belt Yes     Self-Exams Yes     Parent/Sibling W/ Cabg, Mi Or Angioplasty Before 65f 55m? No     Social History Narrative       She does not work. She enjoys riding horses.     Current Outpatient Prescriptions   Medication     albuterol (2.5 MG/3ML) 0.083% neb solution     albuterol (PROAIR HFA/PROVENTIL HFA/VENTOLIN HFA) 108 (90 Base) MCG/ACT Inhaler     ARIPiprazole (ABILIFY) 15 MG tablet     cetirizine (ZYRTEC) 10 MG tablet      cyclobenzaprine (FLEXERIL) 10 MG tablet     dicyclomine (BENTYL) 20 MG tablet     DULoxetine (CYMBALTA) 60 MG EC capsule     EPINEPHrine (EPIPEN/ADRENACLICK/OR ANY BX GENERIC EQUIV) 0.3 MG/0.3ML injection 2-pack     gabapentin (NEURONTIN) 300 MG capsule     HYDROcodone-acetaminophen (NORCO) 5-325 MG per tablet     LORazepam (ATIVAN) 0.5 MG tablet     medical cannabis inhalation (Patient's own supply.  Not a prescription)     medroxyPROGESTERone (DEPO-PROVERA) 150 MG/ML injection     naproxen (NAPROSYN) 500 MG tablet     NONFORMULARY     nystatin (MYCOSTATIN) cream     pantoprazole (PROTONIX) 40 MG EC tablet     prazosin (MINIPRESS) 1 MG capsule     promethazine (PHENERGAN) 6.25 MG/5ML syrup     rizatriptan (MAXALT-MLT) 5 MG ODT tab     sucralfate (CARAFATE) 1 GM tablet     vancomycin (VANCOCIN HCL) 125 MG capsule     varenicline (CHANTIX STARTING MONTH PAK) 0.5 MG X 11 & 1 MG X 42 tablet     No current facility-administered medications for this visit.      Allergies   Allergen Reactions     Ativan [Lorazepam] Hives     At the IV site     Baclofen      hives     Bees Hives, Swelling and Difficulty breathing     Caffeine      Contrast Dye      Hives,   Updated 5/10/2016 CT Contrast.     Iodine Hives     Methocarbamol Swelling     Metoclopramide      Other reaction(s): Tremors  LW Reaction: shaking/sweating     Midazolam      Other reaction(s): Agitation     Monosodium Glutamate      Morphine Other (See Comments)     Difficulty with urination     Nsaids Other (See Comments)     GI BLEED x2     Other (Do Not Use) Other (See Comments)     Xanaflex- pt becomes disoriented and loses bladder control     Reglan [Metoclopramide Hcl] Other (See Comments)     shaking     Soma [Carisoprodol] Visual Disturbance     Sleep walking     Tizanidine      Topamax Other (See Comments)     Topamax [Topiramate] Nausea     Tingling  GI/Vomit     Tramadol      Severe Headache, Seizure Risk     Tylenol W/Codeine [Acetaminophen-Codeine] Nausea  and Itching     Tylenol 3     Valium Other (See Comments)     Becomes aggressive and angry     Versed Other (See Comments)     Zolmitriptan      Makes face feel like its twitching     Droperidol Anxiety     Flu Virus Vaccine Rash      Arm swelling         Objective:  /78  Wt (P) 169 lb (76.7 kg)  LMP 08/07/2018 (Exact Date)  BMI (P) 30.91 kg/m2    General: Alert and in no distress    Head: Normocephalic, atraumatic  Eyes: no scleral icterus or conjunctival erythema   Oropharynx:  Mucous membranes moist  Skin: no erythema, petechiae, or jaundice  CV: regular rhythm by palpation, 2+ distal pulses  Resp: normal respiratory effort without conversational dyspnea   Psych: normal mood and affect    Gait: Non-antalgic, appropriate coordination and balance   Neuro: Motor strength and sensation as noted below    Musculoskeletal:    Hip and Low back exam:    Inspection:     no visible deformity in the low back       normal skin       normal vascular       normal lymphatic       no asymmetry    Palpation:  Tender over the bilateral SI joints.  No tenderness over the anterior hip or greater trochanteric bursa bilaterally.     Lumbar ROM: Full lumbar flexion, extension, rotation, and lateral flexion.  Forward flexion, extension, and right lateral flexion are painful.    Hip ROM:  Full active and passive ROM of the hip bilaterally.  Left hip ROM is painful.    Strength:  5/5 hip extension/flexion/abduction/adduction, knee flexion/extension, ankle dorsiflexion/plantarflexion, great toe extension, toe flexion    Sensation: Altered sensation to light touch over the left lateral lower extremity and bilateral gluteal areas.      Radiology:  Independent visualization of radiographs performed.  CT PELVIS WITHOUT IV CONTRAST8/22/2018  [facility]  Result Narrative   EXAMINATION:  CT PELVIS:  08/22/2018     COMPARISON:  CT abdomen and pelvis 08/20/2015.      CLINICAL DATA:  Sacroiliac joint  disorder.      FINDINGS:  There are postoperative changes from a posterior L4-5 fusion.    There is intervertebral body fusion at this level.  No evidence of   hardware complications.  Sacroiliac joints are symmetric and normal in   appearance.  No acute fractures or dislocations.  The uterus and bladder   are grossly normal.  No free fluid in the pelvis.      IMPRESSION:  Postoperative changes at L4-5, otherwise negative.            XR PELVIS ROUTINE 1 OR 2 VIEWS8/22/2018  [facility]  Result Narrative   EXAMINATION:  AP PELVIS:  08/22/2018     COMPARISON:  None.      CLINICAL DATA:  Sacroiliac joint disorder.      FINDINGS:  No acute fractures or dislocations. Hip joints are well   preserved.  Sacroiliac joints are symmetric and normal in appearance.    Postoperative changes from an L4-5 fusion.      IMPRESSION:  Postoperative changes from an L4-5 fusion.            Assessment:  1. Bilateral hip pain        Plan:  Discussed the assessment with the patient and developed a plan together:  -Rehab: Physical Therapy: [facility]. Please do 5-6 days of exercises per week between formal sessions and the home exercises they provide.  -Ice or heat 15-20 minutes as needed (Avoid sleeping on a heating pad or ice)  -Ibuprofen or Tylenol as directed on packaging as needed for pain or soreness.  Please take ibuprofen with food.     -We also discussed other future treatment options:  Steroid injection of the hip joint and knee joint     Follow Up: 6-8 weeks or sooner if symptoms fail to improve or worsen. Please call with any questions or concerns.     Leisa Grant MD, CAQ Sports Medicine  [facility]

## 2018-08-27 NOTE — MR AVS SNAPSHOT
"              After Visit Summary   8/27/2018    Katy Islas    MRN: [id number]           Patient Information     Date Of Birth          1990        Visit Information        Provider Department      8/27/2018 12:20 PM Shira Faustin; Humera Grant MD [facility]        Today's Diagnoses     Bilateral hip pain    -  1      Care Instructions    Today's Plan of Care:  -Rehab: Physical Therapy: [facility]. Please do 5-6 days of exercises per week between formal sessions and the home exercises they provide.  -Ice or heat 15-20 minutes as needed (Avoid sleeping on a heating pad or ice)  -Ibuprofen or Tylenol as directed on packaging as needed for pain or soreness.  Please take ibuprofen with food.     -We also discussed other future treatment options:  Steroid injection of the hip joint and knee joint injection    Follow Up: 6-8 weeks or sooner if symptoms fail to improve or worsen. Please call with any questions or concerns.               Follow-ups after your visit        Additional Services     PHYSICAL THERAPY REFERRAL       Treatment: Evaluation & Treatment  Special Instructions/Modalities: [facility]  Special Programs: None    Please be aware that coverage of these services is subject to the terms and limitations of your health insurance plan.  Call member services at your health plan with any benefit or coverage questions.      **Note to Provider:  If you are referring outside of [place] for the therapy appointment, please list the name of the location in the \"special instructions\" above, print the referral and give to the patient to schedule the appointment.                  Who to contact     If you have questions or need follow up information about today's clinic visit or your schedule please contact [facility] directly at [phone].  Normal or non-critical lab and imaging results will be communicated to you by MyChart, letter or " phone within 4 business days after the clinic has received the results. If you do not hear from us within 7 days, please contact the clinic through [employer] or phone. If you have a critical or abnormal lab result, we will notify you by phone as soon as possible.  Submit refill requests through [employer] or call your pharmacy and they will forward the refill request to us. Please allow 3 business days for your refill to be completed.          Additional Information About Your Visit        [employer] Information     [employer] gives you secure access to your electronic health record. If you see a primary care provider, you can also send messages to your care team and make appointments. If you have questions, please call your primary care clinic.  If you do not have a primary care provider, please call [phone] and they will assist you.        Care EveryWhere ID     This is your Care EveryWhere ID. This could be used by other organizations to access your [place] medical records  [id number]        Your Vitals Were     Last Period                   08/07/2018 (Exact Date)            Blood Pressure from Last 3 Encounters:   08/27/18 112/78   08/21/18 114/78   08/20/18 114/75    Weight from Last 3 Encounters:   08/27/18 (P) 169 lb (76.7 kg)   08/21/18 169 lb (76.7 kg)   08/20/18 167 lb 3.2 oz (75.8 kg)              We Performed the Following     PHYSICAL THERAPY REFERRAL          Today's Medication Changes          These changes are accurate as of 8/27/18  1:10 PM.  If you have any questions, ask your nurse or doctor.               These medicines have changed or have updated prescriptions.        Dose/Directions    pantoprazole 40 MG EC tablet   Commonly known as:  PROTONIX   This may have changed:    - when to take this  - additional instructions   Used for:  Gastroesophageal reflux disease without esophagitis        Dose:  40 mg   Take 1 tablet (40 mg) by mouth daily Take 30-60 minutes before a meal.   Quantity:  30  tablet   Refills:  3                Primary Care Provider Office Phone # Fax #    Aura DIAZ LOVE Rosario [phone]       [address] [place] [address] [address]  [facility] [address]        Equal Access to Services     VIKY VILLANUEVA : Hadii aad ku hadpeteo Soomaali, waaxda luqadaha, qaybta kaalmada adeegyada, waxfernando yoselin cathypercy tomlinluis mlakshmi turner. So [facility] [phone].    ATENCIÓN: Si habla español, tiene a kerr disposición servicios gratuitos de asistencia lingüística. Llame al [phone].    We comply with applicable federal civil rights laws and [place] laws. We do not discriminate on the basis of race, color, national origin, age, disability, sex, sexual orientation, or gender identity.            Thank you!     Thank you for choosing [facility]  for your care. Our goal is always to provide you with excellent care. Hearing back from our patients is one way we can continue to improve our services. Please take a few minutes to complete the written survey that you may receive in the mail after your visit with us. Thank you!             Your Updated Medication List - Protect others around you: Learn how to safely use, store and throw away your medicines at www.disposemymeds.org.          This list is accurate as of 8/27/18  1:10 PM.  Always use your most recent med list.                   Brand Name Dispense Instructions for use Diagnosis    * albuterol 108 (90 Base) MCG/ACT inhaler    PROAIR HFA/PROVENTIL HFA/VENTOLIN HFA    3 Inhaler    Inhale 2 puffs into the lungs every 6 hours as needed for shortness of breath / dyspnea or wheezing    Mild persistent asthma without complication       * albuterol (2.5 MG/3ML) 0.083% neb solution     50 vial    Take 1 vial (2.5 mg) by nebulization every 6 hours as needed for shortness of breath / dyspnea or wheezing    Mild persistent asthma without complication       ARIPiprazole 15 MG tablet    ABILIFY    90 tablet    Take 1 tablet (15 mg) by mouth  At Bedtime    Adjustment disorder with mixed anxiety and depressed mood, Anxiety       cetirizine 10 MG tablet    zyrTEC    90 tablet    Take 1 tablet (10 mg) by mouth daily    Mild persistent asthma without complication       cyclobenzaprine 10 MG tablet    FLEXERIL    60 tablet    Take 1 tablet (10 mg) by mouth 2 times daily as needed for muscle spasms or other (back pain)    Chronic bilateral low back pain with left-sided sciatica       dicyclomine 20 MG tablet    BENTYL    60 tablet    Take 1 tablet (20 mg) by mouth 4 times daily as needed (ABDOMINAL CRAMPING) AS NEEDED FOR CRAMPING    Hx of Crohn's disease       DULoxetine 60 MG EC capsule    CYMBALTA    90 capsule    Take 1 capsule (60 mg) by mouth daily    Adjustment disorder with mixed anxiety and depressed mood       EPINEPHrine 0.3 MG/0.3ML injection 2-pack    EPIPEN/ADRENACLICK/or ANY BX GENERIC EQUIV    0.6 mL    Inject 0.3 mLs (0.3 mg) into the muscle once as needed for anaphylaxis    Bee sting allergy       gabapentin 300 MG capsule    NEURONTIN    120 capsule    Take 1-3 capsules (300-900 mg) by mouth 3 times daily    Chronic bilateral low back pain with left-sided sciatica       HYDROcodone-acetaminophen 5-325 MG per tablet    NORCO     Take 1 tablet every 6 hours by oral route as needed for 15 days.    Nausea       LORazepam 0.5 MG tablet    ATIVAN     Take 0.5 mg by mouth 2 times daily        medical cannabis inhalation (Patient's own supply.  Not a prescription)      Inhale into the lungs 3 times daily as needed Reported on 3/28/2017        medroxyPROGESTERone 150 MG/ML injection    DEPO-PROVERA    3 mL    Inject 1 mL (150 mg) into the muscle every 3 months    Encounter for initial prescription of injectable contraceptive       naproxen 500 MG tablet    NAPROSYN    60 tablet    Take 1 tablet (500 mg) by mouth 2 times daily (with meals)    Chronic bilateral low back pain with left-sided sciatica       NONFORMULARY      Apply 30 mLs topically 4  times daily        nystatin cream    MYCOSTATIN     nystatin 100,000 unit/gram topical cream    Nausea       pantoprazole 40 MG EC tablet    PROTONIX    30 tablet    Take 1 tablet (40 mg) by mouth daily Take 30-60 minutes before a meal.    Gastroesophageal reflux disease without esophagitis       prazosin 1 MG capsule    MINIPRESS     Take 1 mg by mouth At Bedtime        promethazine 6.25 MG/5ML syrup    PHENERGAN    560 mL    Take 10 mLs (12.5 mg) by mouth 4 times daily as needed for nausea    Nausea       rizatriptan 5 MG ODT tab    MAXALT-MLT    18 tablet    Take 1-2 tablets (5-10 mg) by mouth at onset of headache for migraine May repeat in 2 hours. Max 6 tablets/24 hours.    Migraine with aura and without status migrainosus, not intractable       sucralfate 1 GM tablet    CARAFATE    60 tablet    Take 1 tablet (1 g) by mouth 4 times daily as needed    Gastroesophageal reflux disease without esophagitis       vancomycin 125 MG capsule    VANCOCIN HCL    40 capsule    Take 1 capsule (125 mg) by mouth 4 times daily        varenicline 0.5 MG X 11 & 1 MG X 42 tablet    CHANTIX STARTING MONTH ILAN    53 tablet    Take 0.5 mg tab daily for 3 days, then 0.5 mg tab twice daily for 4 days, then 1 mg twice daily.    Tobacco use disorder       * Notice:  This list has 2 medication(s) that are the same as other medications prescribed for you. Read the directions carefully, and ask your doctor or other care provider to review them with you.

## 2018-08-27 NOTE — MR AVS SNAPSHOT
After Visit Summary   8/27/2018    Katy Islas    MRN: [id number]           Patient Information     Date Of Birth          1990        Visit Information        Provider Department      8/27/2018 10:00 AM Shira Faustin; Aaron Parrish, [facility]        Today's Diagnoses     Sensorineural hearing loss (SNHL) of both ears    -  1       Follow-ups after your visit        Who to contact     If you have questions or need follow up information about today's clinic visit or your schedule please contact [facility] directly at [phone].  Normal or non-critical lab and imaging results will be communicated to you by MyChart, letter or phone within 4 business days after the clinic has received the results. If you do not hear from us within 7 days, please contact the clinic through [employer] or phone. If you have a critical or abnormal lab result, we will notify you by phone as soon as possible.  Submit refill requests through [employer] or call your pharmacy and they will forward the refill request to us. Please allow 3 business days for your refill to be completed.          Additional Information About Your Visit        MyChart Information     [employer] gives you secure access to your electronic health record. If you see a primary care provider, you can also send messages to your care team and make appointments. If you have questions, please call your primary care clinic.  If you do not have a primary care provider, please call [phone] and they will assist you.        Care EveryWhere ID     This is your Care EveryWhere ID. This could be used by other organizations to access your [place] medical records  [id number]        Your Vitals Were     Last Period                   08/07/2018 (Exact Date)            Blood Pressure from Last 3 Encounters:   08/27/18 112/78   08/21/18 114/78   08/20/18 114/75    Weight from Last 3 Encounters:   08/27/18 (P) 169 lb (76.7 kg)    08/21/18 169 lb (76.7 kg)   08/20/18 167 lb 3.2 oz (75.8 kg)              We Performed the Following     HEARING AID CHECK, BINAURAL          Today's Medication Changes          These changes are accurate as of 8/27/18  3:58 PM.  If you have any questions, ask your nurse or doctor.               These medicines have changed or have updated prescriptions.        Dose/Directions    pantoprazole 40 MG EC tablet   Commonly known as:  PROTONIX   This may have changed:    - when to take this  - additional instructions   Used for:  Gastroesophageal reflux disease without esophagitis        Dose:  40 mg   Take 1 tablet (40 mg) by mouth daily Take 30-60 minutes before a meal.   Quantity:  30 tablet   Refills:  3                Primary Care Provider Office Phone # Fax #    Aura EMILY Tomlinson-LOVE Moss [phone]       [address] [place] [address] [address]  [facility] [address]        Equal Access to Services     [facility]: Hadii aad ku hadasho Soyvette, waaxda luqadaha, qaybta kaalmada adefloydyada, susan satno . So [facility] [phone].    ATENCIÓN: Si habla español, tiene a kerr disposición servicios gratuitos de asistencia lingüística. Llame al [phone].    We comply with applicable federal civil rights laws and [place] laws. We do not discriminate on the basis of race, color, national origin, age, disability, sex, sexual orientation, or gender identity.            Thank you!     Thank you for choosing [facility]  for your care. Our goal is always to provide you with excellent care. Hearing back from our patients is one way we can continue to improve our services. Please take a few minutes to complete the written survey that you may receive in the mail after your visit with us. Thank you!             Your Updated Medication List - Protect others around you: Learn how to safely use, store and throw away your medicines at www.disposemymeds.org.          This list is  accurate as of 8/27/18  3:58 PM.  Always use your most recent med list.                   Brand Name Dispense Instructions for use Diagnosis    * albuterol 108 (90 Base) MCG/ACT inhaler    PROAIR HFA/PROVENTIL HFA/VENTOLIN HFA    3 Inhaler    Inhale 2 puffs into the lungs every 6 hours as needed for shortness of breath / dyspnea or wheezing    Mild persistent asthma without complication       * albuterol (2.5 MG/3ML) 0.083% neb solution     50 vial    Take 1 vial (2.5 mg) by nebulization every 6 hours as needed for shortness of breath / dyspnea or wheezing    Mild persistent asthma without complication       ARIPiprazole 15 MG tablet    ABILIFY    90 tablet    Take 1 tablet (15 mg) by mouth At Bedtime    Adjustment disorder with mixed anxiety and depressed mood, Anxiety       cetirizine 10 MG tablet    zyrTEC    90 tablet    Take 1 tablet (10 mg) by mouth daily    Mild persistent asthma without complication       cyclobenzaprine 10 MG tablet    FLEXERIL    60 tablet    Take 1 tablet (10 mg) by mouth 2 times daily as needed for muscle spasms or other (back pain)    Chronic bilateral low back pain with left-sided sciatica       dicyclomine 20 MG tablet    BENTYL    60 tablet    Take 1 tablet (20 mg) by mouth 4 times daily as needed (ABDOMINAL CRAMPING) AS NEEDED FOR CRAMPING    Hx of Crohn's disease       DULoxetine 60 MG EC capsule    CYMBALTA    90 capsule    Take 1 capsule (60 mg) by mouth daily    Adjustment disorder with mixed anxiety and depressed mood       EPINEPHrine 0.3 MG/0.3ML injection 2-pack    EPIPEN/ADRENACLICK/or ANY BX GENERIC EQUIV    0.6 mL    Inject 0.3 mLs (0.3 mg) into the muscle once as needed for anaphylaxis    Bee sting allergy       gabapentin 300 MG capsule    NEURONTIN    120 capsule    Take 1-3 capsules (300-900 mg) by mouth 3 times daily    Chronic bilateral low back pain with left-sided sciatica       HYDROcodone-acetaminophen 5-325 MG per tablet    NORCO     Take 1 tablet every 6 hours  by oral route as needed for 15 days.    Nausea       LORazepam 0.5 MG tablet    ATIVAN     Take 0.5 mg by mouth 2 times daily        medical cannabis inhalation (Patient's own supply.  Not a prescription)      Inhale into the lungs 3 times daily as needed Reported on 3/28/2017        medroxyPROGESTERone 150 MG/ML injection    DEPO-PROVERA    3 mL    Inject 1 mL (150 mg) into the muscle every 3 months    Encounter for initial prescription of injectable contraceptive       naproxen 500 MG tablet    NAPROSYN    60 tablet    Take 1 tablet (500 mg) by mouth 2 times daily (with meals)    Chronic bilateral low back pain with left-sided sciatica       NONFORMULARY      Apply 30 mLs topically 4 times daily        nystatin cream    MYCOSTATIN     nystatin 100,000 unit/gram topical cream    Nausea       pantoprazole 40 MG EC tablet    PROTONIX    30 tablet    Take 1 tablet (40 mg) by mouth daily Take 30-60 minutes before a meal.    Gastroesophageal reflux disease without esophagitis       prazosin 1 MG capsule    MINIPRESS     Take 1 mg by mouth At Bedtime        promethazine 6.25 MG/5ML syrup    PHENERGAN    560 mL    Take 10 mLs (12.5 mg) by mouth 4 times daily as needed for nausea    Nausea       rizatriptan 5 MG ODT tab    MAXALT-MLT    18 tablet    Take 1-2 tablets (5-10 mg) by mouth at onset of headache for migraine May repeat in 2 hours. Max 6 tablets/24 hours.    Migraine with aura and without status migrainosus, not intractable       sucralfate 1 GM tablet    CARAFATE    60 tablet    Take 1 tablet (1 g) by mouth 4 times daily as needed    Gastroesophageal reflux disease without esophagitis       vancomycin 125 MG capsule    VANCOCIN HCL    40 capsule    Take 1 capsule (125 mg) by mouth 4 times daily        varenicline 0.5 MG X 11 & 1 MG X 42 tablet    CHANTIX STARTING MONTH ILAN    53 tablet    Take 0.5 mg tab daily for 3 days, then 0.5 mg tab twice daily for 4 days, then 1 mg twice daily.    Tobacco use disorder        * Notice:  This list has 2 medication(s) that are the same as other medications prescribed for you. Read the directions carefully, and ask your doctor or other care provider to review them with you.

## 2018-08-27 NOTE — TELEPHONE ENCOUNTER
You placed a referral to [facility] on 7/23/18.    It is unclear if the patient has scheduled yet, not finding a report showing they were seen.     Please forward to your team if further follow up is needed to see if they have made this appointment.      Thank you!   Natacha/Referral Representative for [facility]

## 2018-08-27 NOTE — TELEPHONE ENCOUNTER
Patient lost number and just asked for it at last exam. So no follow up needed.     Zach Rosario PA-C  [facility]

## 2018-08-27 NOTE — PATIENT INSTRUCTIONS
Today's Plan of Care:  -Rehab: Physical Therapy: [facility]. Please do 5-6 days of exercises per week between formal sessions and the home exercises they provide.  -Ice or heat 15-20 minutes as needed (Avoid sleeping on a heating pad or ice)  -Ibuprofen or Tylenol as directed on packaging as needed for pain or soreness.  Please take ibuprofen with food.     -We also discussed other future treatment options:  Steroid injection of the hip joint and knee joint injection    Follow Up: 6-8 weeks or sooner if symptoms fail to improve or worsen. Please call with any questions or concerns.

## 2018-08-27 NOTE — LETTER
8/27/2018         RE: Katy Islas  [address]  [place] [address]        Dear Colleague,    Thank you for referring your patient, Katy Islas, to the [facility]. Please see a copy of my visit note below.    Sports Medicine Clinic Visit    PCP: Aura Rosario    CC: Patient presents with:  Musculoskeletal Problem: left hip pain  Consult      HPI:  Katy Islas is a 28 year old female who is seen in as a self referral.  About a month ago, she walked 5 miles and began to have bilateral anterior hip pain after.  She felt that the pain improved over the next couple of days. She has a history of bilateral SI joint pain and dysfunction.  She notes that her spine surgeon (Dr. Jeniffer Millan in [place] at [facility]) indicated evidence of hip joint narrowing and wanted her to be seen (as he doesn't take care of hips). She is going to be getting an SI joint fusion on the left side, then on the right side.  She is waiting for insurance approval so she is not sure of the date yet.  She notes bilateral anterior hip pain.  She rates the pain at a 7/10 at its worst and a 4/10 currently.  Symptoms are relieved with reclined sitting, ice and heat, and slightly with naproxen. Symptoms are worsened by prolonged sitting, activity, lying down, and riding a horse. She endorses numbness in both glutes with prolonged sitting, numbness and tingling down both legs, popping, and pinching.   She denies swelling, bruising, grinding, catching, locking and weakness.  Other treatment has included nothing. She notes difficulty with riding horse and hip abduction.   Of note, she had her tailbone was removed ~1 year ago (it was fractured, she attributes it to falling off her horse 7 years ago).    Review of Systems:  Musculoskeletal: as above  Remainder of review of systems is negative including constitutional, eyes, ENT, CV, pulmonary, GI, , endocrine, skin,  hematologic, and neurologic except as noted in HPI or medical history.    History reviewed. No pertinent past surgical/medical/family/social history other than as mentioned in HPI.    Patient Active Problem List   Diagnosis     Hearing loss     Allergic rhinitis     Migraine     Benign neoplasm of skin of lower limb, including hip     Dysmenorrhea     Crohn's colitis (H)     Mild major depression (H)     Anxiety     Chronic pain     Oral thrush     Nausea and vomiting     Grand mal seizure disorder (H)     Bipolar disorder (H)     Marijuana abuse     Adjustment disorder with mixed anxiety and depressed mood     Herpes simplex virus infection     Gastroesophageal reflux disease without esophagitis     Bee sting allergy     Mild persistent asthma without complication     Chronic bilateral low back pain with left-sided sciatica     Vitamin D deficiency     Atypical mole     Lumbar disc disease with radiculopathy     S/P lumbar fusion     Requires teletypeCoubic device for the deaf (TTD)     Upper GI bleed - suspected     Tobacco use disorder     History of pseudoseizure     Closed fracture of coccyx with nonunion, subsequent encounter     Postoperative pain     Iliotibial band syndrome affecting lower leg, right     Chondromalacia of right patellofemoral joint     Melanocytic nevus, unspecified location     Dysplastic skin lesion     Past Medical History:   Diagnosis Date     Abdominal pain 10/31- 11/4/2005    [facility] admit for Cro[facility]     Allergic rhinitis, cause unspecified     Allergic rhinitis     Arthritis      C. difficile diarrhea     Past, no current diarrhea.     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, without mention of intractable epilepsy     pseudoseizures diagnosed after extensive neurologic eval     Migraine 07/21/12    D/C 07/22/12-[facility]     Migraine, unspecified, without mention of intractable migraine without mention of status migrainosus     Migraine     Mild intermittent asthma     mild intermittent     Mycoplasma infection in conditions classified elsewhere and of unspecified site      Other chronic pain     Back pain for 6 years     Renal disease     Kidney stones     Unspecified hearing loss     congenital hearing loss     Past Surgical History:   Procedure Laterality Date     COLONOSCOPY  7/1/2009    [facility].     FUSION SPINE POSTERIOR MINIMALLY INVASIVE ONE LEVEL N/A 2/23/2017    Procedure: FUSION SPINE POSTERIOR MINIMALLY INVASIVE ONE LEVEL;  L4-5 Oblique Lateral Lumbar Interbody Fusion   Epidural steroid injection.   Transpedicular Bone marrow aspiration;  Surgeon: Jeniffer Eugene MD;  Location: [facility] OR     HC COLONOSCOPY THRU STOMA WITH BIOPSY  10/29/2003    Impression is that of normal appearing colonoscopy, without evidence of rectal bleeding.     HC COLONOSCOPY THRU STOMA, DIAGNOSTIC  10/00    normal     HC COLONOSCOPY THRU STOMA, DIAGNOSTIC  Oct 2009    Dr López- normal     HC EEG AWAKE AND SLEEP      abnormal     HC MRI BRAIN W/O CONTRAST  12/00    normal     HC REMOVAL GALLBLADDER  8/5/2009    [facility], [facility].     HC UGI ENDOSCOPY DIAG W BIOPSY  11/11/09    Normal esophagus     HC UGI ENDOSCOPY, SIMPLE EXAM  7/00, 10/00    mild chronic esophagitis and duodenitis, neg H pylori     HC UGI ENDOSCOPY, SIMPLE EXAM  01/20/2005    Esophagogastroduodenoscopy, colonoscopy with biopsies.  [facility]     HC UGI ENDOSCOPY, SIMPLE EXAM  7/1/2009    [facility], [facility].     HC UGI ENDOSCOPY, SIMPLE EXAM  11/11/2009     attempted upper GI, pt. could not tolerate procedure:[facility]     ORTHOPEDIC SURGERY  October 19,2011    diskectomy L4-L5     Family History   Problem Relation Age of Onset     GASTROINTESTINAL DISEASE Brother      severe Crohn's     Neurologic Disorder Brother      Seizures post head injury     Depression Brother      Substance Abuse Brother      Genitourinary Problems Father      kidney stones     Diabetes Father      HEART DISEASE Father      Open heart surgery     Breast Cancer Maternal Grandmother      Parkinsonism Maternal Grandmother      Cerebrovascular Disease Paternal Grandmother      Cancer Maternal Grandfather      Lung     Cardiovascular Paternal Grandfather      Heart Attack     Substance Abuse Mother      Lung Cancer Paternal Uncle      Cancer Paternal Uncle      Lung Cancer Maternal Uncle      Social History     Social History     Marital status: Single     Spouse name: N/A     Number of children: N/A     Years of education: N/A     Occupational History     Not on file.     Social History Main Topics     Smoking status: Former Smoker     Packs/day: 0.50     Types: Cigarettes     Smokeless tobacco: Never Used      Comment: quit July 2018 - 4 days no cig     Alcohol use 0.6 oz/week     1 Cans of beer per week      Comment: once in while      Drug use: Yes     Special: Marijuana      Comment: Medical Marijuana currently     Sexual activity: Not Currently     Partners: Male     Birth control/ protection:      Other Topics Concern      Service No     Blood Transfusions No     Caffeine Concern No     Occupational Exposure No     Hobby Hazards No     Sleep Concern No     Stress Concern No     Weight Concern No     Special Diet No     Back Care No     Exercise Yes     Bike Helmet No     Seat Belt Yes     Self-Exams Yes     Parent/Sibling W/ Cabg, Mi Or Angioplasty Before 65f 55m? No     Social History Narrative       She does not work. She enjoys riding horses.     Current Outpatient  Prescriptions   Medication     albuterol (2.5 MG/3ML) 0.083% neb solution     albuterol (PROAIR HFA/PROVENTIL HFA/VENTOLIN HFA) 108 (90 Base) MCG/ACT Inhaler     ARIPiprazole (ABILIFY) 15 MG tablet     cetirizine (ZYRTEC) 10 MG tablet     cyclobenzaprine (FLEXERIL) 10 MG tablet     dicyclomine (BENTYL) 20 MG tablet     DULoxetine (CYMBALTA) 60 MG EC capsule     EPINEPHrine (EPIPEN/ADRENACLICK/OR ANY BX GENERIC EQUIV) 0.3 MG/0.3ML injection 2-pack     gabapentin (NEURONTIN) 300 MG capsule     HYDROcodone-acetaminophen (NORCO) 5-325 MG per tablet     LORazepam (ATIVAN) 0.5 MG tablet     medical cannabis inhalation (Patient's own supply.  Not a prescription)     medroxyPROGESTERone (DEPO-PROVERA) 150 MG/ML injection     naproxen (NAPROSYN) 500 MG tablet     NONFORMULARY     nystatin (MYCOSTATIN) cream     pantoprazole (PROTONIX) 40 MG EC tablet     prazosin (MINIPRESS) 1 MG capsule     promethazine (PHENERGAN) 6.25 MG/5ML syrup     rizatriptan (MAXALT-MLT) 5 MG ODT tab     sucralfate (CARAFATE) 1 GM tablet     vancomycin (VANCOCIN HCL) 125 MG capsule     varenicline (CHANTIX STARTING MONTH PAK) 0.5 MG X 11 & 1 MG X 42 tablet     No current facility-administered medications for this visit.      Allergies   Allergen Reactions     Ativan [Lorazepam] Hives     At the IV site     Baclofen      hives     Bees Hives, Swelling and Difficulty breathing     Caffeine      Contrast Dye      Hives,   Updated 5/10/2016 CT Contrast.     Iodine Hives     Methocarbamol Swelling     Metoclopramide      Other reaction(s): Tremors  LW Reaction: shaking/sweating     Midazolam      Other reaction(s): Agitation     Monosodium Glutamate      Morphine Other (See Comments)     Difficulty with urination     Nsaids Other (See Comments)     GI BLEED x2     Other (Do Not Use) Other (See Comments)     Xanaflex- pt becomes disoriented and loses bladder control     Reglan [Metoclopramide Hcl] Other (See Comments)     shaking     Soma [Carisoprodol]  Visual Disturbance     Sleep walking     Tizanidine      Topamax Other (See Comments)     Topamax [Topiramate] Nausea     Tingling  GI/Vomit     Tramadol      Severe Headache, Seizure Risk     Tylenol W/Codeine [Acetaminophen-Codeine] Nausea and Itching     Tylenol 3     Valium Other (See Comments)     Becomes aggressive and angry     Versed Other (See Comments)     Zolmitriptan      Makes face feel like its twitching     Droperidol Anxiety     Flu Virus Vaccine Rash      Arm swelling         Objective:  /78  Wt (P) 169 lb (76.7 kg)  LMP 08/07/2018 (Exact Date)  BMI (P) 30.91 kg/m2    General: Alert and in no distress    Head: Normocephalic, atraumatic  Eyes: no scleral icterus or conjunctival erythema   Oropharynx:  Mucous membranes moist  Skin: no erythema, petechiae, or jaundice  CV: regular rhythm by palpation, 2+ distal pulses  Resp: normal respiratory effort without conversational dyspnea   Psych: normal mood and affect    Gait: Non-antalgic, appropriate coordination and balance   Neuro: Motor strength and sensation as noted below    Musculoskeletal:    Hip and Low back exam:    Inspection:     no visible deformity in the low back       normal skin       normal vascular       normal lymphatic       no asymmetry    Palpation:  Tender over the bilateral SI joints.  No tenderness over the anterior hip or greater trochanteric bursa bilaterally.     Lumbar ROM: Full lumbar flexion, extension, rotation, and lateral flexion.  Forward flexion, extension, and right lateral flexion are painful.    Hip ROM:  Full active and passive ROM of the hip bilaterally.  Left hip ROM is painful.    Strength:  5/5 hip extension/flexion/abduction/adduction, knee flexion/extension, ankle dorsiflexion/plantarflexion, great toe extension, toe flexion    Sensation: Altered sensation to light touch over the left lateral lower extremity and bilateral gluteal areas.      Radiology:  Independent visualization of radiographs  performed.  CT PELVIS WITHOUT IV CONTRAST8/22/2018  [facility]  Result Narrative   EXAMINATION:  CT PELVIS:  08/22/2018     COMPARISON:  CT abdomen and pelvis 08/20/2015.      CLINICAL DATA:  Sacroiliac joint disorder.      FINDINGS:  There are postoperative changes from a posterior L4-5 fusion.    There is intervertebral body fusion at this level.  No evidence of   hardware complications.  Sacroiliac joints are symmetric and normal in   appearance.  No acute fractures or dislocations.  The uterus and bladder   are grossly normal.  No free fluid in the pelvis.      IMPRESSION:  Postoperative changes at L4-5, otherwise negative.            XR PELVIS ROUTINE 1 OR 2 VIEWS8/22/2018  [facility]  Result Narrative   EXAMINATION:  AP PELVIS:  08/22/2018     COMPARISON:  None.      CLINICAL DATA:  Sacroiliac joint disorder.      FINDINGS:  No acute fractures or dislocations. Hip joints are well   preserved.  Sacroiliac joints are symmetric and normal in appearance.    Postoperative changes from an L4-5 fusion.      IMPRESSION:  Postoperative changes from an L4-5 fusion.            Assessment:  1. Bilateral hip pain        Plan:  Discussed the assessment with the patient and developed a plan together:  -Rehab: Physical Therapy: [facility] L[facility]. Please do 5-6 days of exercises per week between formal sessions and the home exercises they provide.  -Ice or heat 15-20 minutes as needed (Avoid sleeping on a heating pad or ice)  -Ibuprofen or Tylenol as directed on packaging as needed for pain or soreness.  Please take ibuprofen with food.     -We also discussed other future treatment options:  Steroid injection of the hip joint and knee joint     Follow Up: 6-8 weeks or sooner if symptoms fail to improve or worsen. Please call with any questions or concerns.     Leisa Grant MD, [facility]  [facility]      Again, thank you for allowing me to  participate in the care of your patient.        Sincerely,        Humera Grant MD

## 2018-08-27 NOTE — PROGRESS NOTES
AUDIOLOGY REPORT: HEARING AID CHECK    SUBJECTIVE:  Katy Islas is a 28 year old female who was seen in the audiology clinic for a hearing aid check. The patient reports the tubing for her right hearing aid is hard and needs to be changed. She is also interested in being evaluated for a cochlear implant, and requests more hearing aid batteries.    OBJECTIVE:    Otoscopy:   RIGHT:  clear ear canal   LEFT:   clear ear canal     Replaced tubing on patient's right hearing aid and cut to appropriate size. Patient reported comfortable fit.    Discussed cochlear implant services available through [employer], gave her information for Michelle Buck, and pre-appointment cochlear implant packet.    Dispensed 36 size 675 batteries.    ASSESSMENT:  Sensorineural hearing loss of both ears    Patient satisfied with current hearing aid settings.    PLAN:  It is recommended that the patient return to clinic as needed or every six months.    Please call this clinic with questions regarding these results or recommendations.    Michelle Brown  Licensed Audiologist, [place] #[id number]  [facility]  8/27/2018

## 2018-08-30 ENCOUNTER — TELEPHONE (OUTPATIENT)
Dept: FAMILY MEDICINE | Facility: OTHER | Age: 28
End: 2018-08-30

## 2018-08-30 DIAGNOSIS — M25.561 RIGHT KNEE PAIN: Primary | ICD-10-CM

## 2018-08-30 NOTE — TELEPHONE ENCOUNTER
Reason for Call:  Same Day Appointment, Requested Provider:  ZAYNAB Daley     PCP: Aura Rosario    Reason for visit: to get Opdyke     Duration of symptoms: ongoing     Have you been treated for this in the past? Yes    Additional comments: Patient's surgeon is out of town and patient is needing a script for Norco till he returns. Informed patient they may need an office visit to get a script, patient would like to be worked in today or tomorrow    Can we leave a detailed message on this number? YES    Phone number patient can be reached at: Cell number on file:    Telephone Information:   Mobile [phone]       Best Time: today or tomorrow     Call taken on 8/30/2018 at 11:28 AM by Sharon Veronica

## 2018-08-30 NOTE — TELEPHONE ENCOUNTER
She talked to the nurse at the surgeon office and they do not have a covering provider they told her to contact her PCP.  Please call

## 2018-08-30 NOTE — TELEPHONE ENCOUNTER
Which knee?     I have injected the right knee in the past. Last time I saw her was for the left knee in which she had an injury and I recommend an MRI (I'm not sure its been done).

## 2018-08-30 NOTE — TELEPHONE ENCOUNTER
Patient should contact surgeon's office. They usually have someone covering for a doctor not in office to fill. I will not be prescribing narcotics for her and I also do not have any availability the next 2 days.    Zach Rosario PA-C  [facility]

## 2018-08-30 NOTE — TELEPHONE ENCOUNTER
Reason for Call:  Other call back    Detailed comments: Patient is wanting to know if it is to soon to have another injection in her knee    Phone Number Patient can be reached at: Cell number on file:    Telephone Information:   Mobile [phone]       Best Time: any    Can we leave a detailed message on this number? YES    Call taken on 8/30/2018 at 11:33 AM by Sharon Veronica

## 2018-08-30 NOTE — TELEPHONE ENCOUNTER
Next step would be a MRI of the right knee.  I would expect her to get a little better relief with the injection.  Happy to see her again if she would like.

## 2018-08-30 NOTE — TELEPHONE ENCOUNTER
Left message on machine to call back  I did put the order in for the MRI if she would like to schedule that.  She will need to follow up with Dr. Maldonado 2-3 days after the MRI for the results.    Becca/ANAND

## 2018-08-30 NOTE — TELEPHONE ENCOUNTER
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 She had no further questions.     Jenni Luo MA

## 2018-08-30 NOTE — TELEPHONE ENCOUNTER
I talked to Katy and she states it is the Right knee. She did get good relief for about 2-3 months.  I informed her that I would have to ask Dr. Maldonado is it was okm, since it has just been the 3 month elliot    Becca/ANAND

## 2018-09-04 DIAGNOSIS — Z98.1 S/P LUMBAR FUSION: ICD-10-CM

## 2018-09-04 DIAGNOSIS — M54.42 CHRONIC BILATERAL LOW BACK PAIN WITH LEFT-SIDED SCIATICA: ICD-10-CM

## 2018-09-04 DIAGNOSIS — M51.16 LUMBAR DISC DISEASE WITH RADICULOPATHY: ICD-10-CM

## 2018-09-04 DIAGNOSIS — G89.4 CHRONIC PAIN SYNDROME: ICD-10-CM

## 2018-09-04 DIAGNOSIS — G89.29 CHRONIC BILATERAL LOW BACK PAIN WITH LEFT-SIDED SCIATICA: ICD-10-CM

## 2018-09-04 NOTE — TELEPHONE ENCOUNTER
diazepam (VALIUM) 10 MG tablet (Discontinued)      Last Written Prescription Date:  3/26/18  Last Fill Quantity: 60,   # refills: 5  Last Office Visit: 8/21/18 Abraham  Future Office visit:    Next 5 appointments (look out 90 days)     Sep 05, 2018  2:10 PM CDT   Return Visit with Will Maldonado DO, [facility]   [facility]    [address] [place] [address] [address]  [facility] [address]   [phone]                   Routing refill request to provider for review/approval because:  Drug not on the [facility], [facility] or  Health refill protocol or controlled substance  Drug not active on patient's medication list

## 2018-09-05 ENCOUNTER — OFFICE VISIT (OUTPATIENT)
Dept: ORTHOPEDICS | Facility: OTHER | Age: 28
End: 2018-09-05
Payer: MEDICARE

## 2018-09-05 VITALS
WEIGHT: 167 LBS | BODY MASS INDEX: 31.53 KG/M2 | TEMPERATURE: 97.5 F | HEIGHT: 61 IN | SYSTOLIC BLOOD PRESSURE: 100 MMHG | DIASTOLIC BLOOD PRESSURE: 64 MMHG

## 2018-09-05 DIAGNOSIS — M25.561 RIGHT ANTERIOR KNEE PAIN: ICD-10-CM

## 2018-09-05 DIAGNOSIS — M22.41 CHONDROMALACIA OF RIGHT PATELLOFEMORAL JOINT: ICD-10-CM

## 2018-09-05 DIAGNOSIS — M22.8X1 PATELLAR MALTRACKING, RIGHT: Primary | ICD-10-CM

## 2018-09-05 PROCEDURE — 99213 OFFICE O/P EST LOW 20 MIN: CPT | Mod: 25 | Performed by: ORTHOPAEDIC SURGERY

## 2018-09-05 PROCEDURE — 20610 DRAIN/INJ JOINT/BURSA W/O US: CPT | Mod: RT | Performed by: ORTHOPAEDIC SURGERY

## 2018-09-05 RX ORDER — TRIAMCINOLONE ACETONIDE 40 MG/ML
40 INJECTION, SUSPENSION INTRA-ARTICULAR; INTRAMUSCULAR ONCE
Qty: 1 ML | Refills: 0
Start: 2018-09-05 | End: 2018-09-05

## 2018-09-05 ASSESSMENT — PAIN SCALES - GENERAL: PAINLEVEL: MILD PAIN (2)

## 2018-09-05 NOTE — PROGRESS NOTES
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 Communication done with  for sign language.  Previous intra-articular injection provided good relief up until recently.  The injection prior to that (IT band injection) did not provide much relief.  States same symptoms as before, anterior knee pain, worst with stairs, hills, and squatting.  Also being seen by spine for SI joint and did go through some combined SI joint and knee PT, 6 sessions, and this aggravated the knee.  No new injuries.  Has not had dedicated anterior knee PT before, has not tried bracing.  Notes she will in the near future undergoing bilateral SI joint fusions.    5/21/18 visit  Communication through an  sign language.  After her last visit she received a right knee distal IT band injection with that provider 1 month of very little relief.  She has been taking Flexeril.  Pain is worse when she goes from a kneeling to standing position.  No new traumas or injuries.  Right knee much worse than left.  Left is recently started bothering her  December 21, 2017 visit:  Katy Islas is a 27 year old female who is seen in consultation at the request of Amber Moss for evaluation of right knee pain.  Mechanism of Injury: No trauma or inciting event.  Location: right knee lateral, anterior  Duration of Pain: On and off for a few years  Rating of Pain:  moderate.    Pain Quality: aching and sharp  Pain is better with: Rest  Pain is worse with: Walking  Treatment so far consists of: Narcotics, previous injections into her knees years ago.   Associated Features: Swelling  Prior history of related problems:    utilized  throughout her visit  She had back surgery October 2016.      REVIEW OF SYSTEMS  General: negative for, night sweats, dizziness, fatigue  Resp: No shortness of breath and no cough  CV: negative for chest pain, syncope or near-syncope  GI: negative for nausea, vomiting and diarrhea  : negative for dysuria and hematuria  Musculoskeletal: as above  Neurologic: negative for syncope   Hematologic: negative for bleeding disorder    Physical Exam:  Vitals: [facility] 08/07/2018 (Exact Date)  BMI= There is no height or weight on file to calculate BMI.  onstitutional: healthy, alert and no acute distress   Psychiatric: mentation appears normal and affect normal/bright  NEURO: no focal deficits  RESP: Normal with easy respirations and no use of accessory muscles to breathe, no audible wheezing or retractions  CV: bilateral lower extremity:   no edema         SKIN: No erythema, rashes, excoriation, or breakdown. No evidence of infection.   JOINT/EXTREMITIES:right knee: Slight valgus alignment.  Patellofemoral crepitus with range of motion and pain.  Pain and tenderness along the lateral patellar facet. Pain and apprehension with patellar lateral stressing. Notes mild discomfort along lateral joint line and medial joint line.  There is no gross instability to varus and valgus testing.  Negative Lockman.  Weak quad tone.  Positive J sign.  GAIT: not tested              Diagnostic Modalities:  None today.  Previous imaging reviewed.  Independent visualization of the images was performed.      Impression: right patellar maltracking   Right anterior knee pain    Plan:  All of the above pertinent physical exam and imaging modalities findings was reviewed with Katy through an . Her symptoms are consistent with mal tracking, and anterior knee pain.  Discussed options, she is agreeable to trying PT again, with a focus on the anterior knee and patellar tracking.  Also agreeable to try a patellar stabilizing brace and  intra-articular injection.    I recommend conservative care for the patient to include formal physical therapy, steroid injections, bracing . Today I provided or dispensed physical therapy, brace- patellar stabilizing.  The patient was counseled about an  injection, including discussion of risks (including infection), contents of the injection, rationale for performing the injection, and expected benefits of the injection. The skin was prepped with alcohol only as she has an iodine allergy and then utilizing sterile technique an injection of the right knee joint from the anterolateral approach in the seated position was performed. The injection consisted 1ml of Kenalog (40mg per 1 ml) mixed with 3ml of 0.5% Marcaine. The patient tolerated the injection well, and there were no complications. The injection site was covered with a Band-Aid. The injection was performed by NATA Fabian, CNP, DNP    If any concerns for infection seek immediate medical evaluation either in the clinic or the ED.     Return to clinic 6, week(s), or sooner as needed for changes.  Re-x-ray on return: No    Scribed by:  NATA Fabian, CNP, GIACOMO  3:16 PM  9/5/2018    I attest I have seen and evaluated the patient.  I agree with above impression and plan.  Tomi Maldonado D.O.

## 2018-09-05 NOTE — LETTER
9/5/2018         RE: Katy Islas  [address]  [place] [address]        Dear Colleague,    Thank you for referring your patient, Katy Islas, to the [facility]. Please see a copy of my visit note below.    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 Communication done with  for sign language.  Previous intra-articular injection provided good relief up until recently.  The injection prior to that (IT band injection) did not provide much relief.  States same symptoms as before, anterior knee pain, worst with stairs, hills, and squatting.  Also being seen by spine for SI joint and did go through some combined SI joint and knee PT, 6 sessions, and this aggravated the knee.  No new injuries.  Has not had dedicated anterior knee PT before, has not tried bracing.  Notes she will in the near future undergoing bilateral SI joint fusions.    5/21/18 visit  Communication through an  sign language.  After her last visit she received a right knee distal IT band injection with that provider 1 month of very little relief.  She has been taking Flexeril.  Pain is worse when she goes from a kneeling to standing position.  No new traumas or injuries.  Right knee much worse than left.  Left is recently started bothering her  December 21, 2017 visit:  Katy Islas is a 27 year old female who is seen in consultation at the request of Amber Moss for evaluation of right knee pain.  Mechanism of Injury: No trauma or inciting event.  Location: right knee lateral, anterior  Duration of Pain: On and off for a few years  Rating of Pain:  moderate.    Pain Quality: aching and sharp  Pain  is better with: Rest  Pain is worse with: Walking  Treatment so far consists of: Narcotics, previous injections into her knees years ago.   Associated Features: Swelling  Prior history of related problems:    utilized throughout her visit  She had back surgery October 2016.      REVIEW OF SYSTEMS  General: negative for, night sweats, dizziness, fatigue  Resp: No shortness of breath and no cough  CV: negative for chest pain, syncope or near-syncope  GI: negative for nausea, vomiting and diarrhea  : negative for dysuria and hematuria  Musculoskeletal: as above  Neurologic: negative for syncope   Hematologic: negative for bleeding disorder    Physical Exam:  Vitals: [facility] 08/07/2018 (Exact Date)  BMI= There is no height or weight on file to calculate BMI.  onstitutional: healthy, alert and no acute distress   Psychiatric: mentation appears normal and affect normal/bright  NEURO: no focal deficits  RESP: Normal with easy respirations and no use of accessory muscles to breathe, no audible wheezing or retractions  CV: bilateral lower extremity:   no edema         SKIN: No erythema, rashes, excoriation, or breakdown. No evidence of infection.   JOINT/EXTREMITIES:right knee: Slight valgus alignment.  Patellofemoral crepitus with range of motion and pain.  Pain and tenderness along the lateral patellar facet. Pain and apprehension with patellar lateral stressing. Notes mild discomfort along lateral joint line and medial joint line.  There is no gross instability to varus and valgus testing.  Negative Lockman.  We ak quad tone.  Positive J sign.  GAIT: not tested              Diagnostic Modalities:  None today.  Previous imaging reviewed.  Independent visualization of the images was performed.      Impression: right patellar maltracking   Right anterior knee pain    Plan:  All of the above pertinent physical exam and imaging modalities findings was reviewed with Katy through an . Her  symptoms are consistent with mal tracking, and anterior knee pain.  Discussed options, she is agreeable to trying PT again, with a focus on the anterior knee and patellar tracking.  Also agreeable to try a patellar stabilizing brace and intra-articular injection.    I recommend conservative care for the patient to include formal physical therapy, steroid injections, bracing . Today I provided or dispensed physical therapy, brace- patellar stabilizing.  The patient was counseled about an  injection, including discussion of risks (including infection), contents of the injection, rationale for performing the injection, and expected benefits of the injection. The skin was prepped with alcohol only as she has an iodine allergy and then utilizing sterile technique an injection of the right knee joint from the anterolateral approach in the seated position was performed. The injection consisted 1ml of Kenalog (40mg per 1 ml) mixed with 3ml of 0.5% Marcaine. The patient tolerated the injection well, and there were no complications. The injection site was covered with a Band-Aid. The injection was performed by NATA Fabian, CNP, DNP    If any concerns for infection seek immediate medical evaluation either in the clinic or the ED.     Return to clinic 6, week(s), or sooner as needed for changes.  Re-x-ray on return: No    Scribed by:  NATA Fabian, CNP, GIACOMO  3:16 PM  9/5/2018    I attest I have seen and evaluated the patient.  I agree with above impression and plan.  Tomi Maldonado D.O.          Again, thank you for allowing me to participate in the care of your patient.        Sincerely,        Will Maldonado, DO

## 2018-09-05 NOTE — MR AVS SNAPSHOT
"              After Visit Summary   9/5/2018    Katy Islas    MRN: [id number]           Patient Information     Date Of Birth          1990        Visit Information        Provider Department      9/5/2018 2:10 PM Will Maldonado DO; [facility]        Today's Diagnoses     Patellar maltracking, right    -  1    Right anterior knee pain        Chondromalacia of right patellofemoral joint           Follow-ups after your visit        Additional Services     PHYSICAL THERAPY REFERRAL       *This therapy referral will be filtered to a centralized scheduling office at [facility] and the patient will receive a call to schedule an appointment at a [facility] location most convenient for them. *       Treatment: Evaluation & Treatment  Special Instructions/Modalities: anterior knee, patellar maltracking, quad, core, glut strengthening.   Special Programs:     Please be aware that coverage of these services is subject to the terms and limitations of your health insurance plan.  Call member services at your health plan with any benefit or coverage questions.      **Note to Provider:  If you are referring outside of [facility] for the therapy appointment, please list the name of the location in the \"special instructions\" above, print the referral and give to the patient to schedule the appointment.                  Who to contact     If you have questions or need follow up information about today's clinic visit or your schedule please contact [facility] directly at [phone].  Normal or non-critical lab and imaging results will be communicated to you by MyChart, letter or phone within 4 business days after the clinic has received the results. If you do not hear from us within 7 days, please contact the clinic through MyChart or phone. If you have a critical or abnormal lab result, we will notify you by phone as soon as possible.  Submit refill " "requests through [employer] or call your pharmacy and they will forward the refill request to us. Please allow 3 business days for your refill to be completed.          Additional Information About Your Visit        [employer] Information     [employer] gives you secure access to your electronic health record. If you see a primary care provider, you can also send messages to your care team and make appointments. If you have questions, please call your primary care clinic.  If you do not have a primary care provider, please call [phone] and they will assist you.        Care EveryWhere ID     This is your Care EveryWhere ID. This could be used by other organizations to access your [facility] medical records  [id number]        Your Vitals Were     Temperature Height Last Period BMI (Body Mass Index)          97.5  F (36.4  C) (Temporal) 5' 1.42\" (1.56 m) 08/07/2018 (Exact Date) 31.12 kg/m2         Blood Pressure from Last 3 Encounters:   09/05/18 100/64   08/27/18 112/78   08/21/18 114/78    Weight from Last 3 Encounters:   09/05/18 167 lb (75.8 kg)   08/27/18 (P) 169 lb (76.7 kg)   08/21/18 169 lb (76.7 kg)              We Performed the Following     Kenalog 40 MG  []     Large Joint/Bursa injection and/or drainage (Shoulder, Knee)     PHYSICAL THERAPY REFERRAL          Today's Medication Changes          These changes are accurate as of 9/5/18  3:17 PM.  If you have any questions, ask your nurse or doctor.               Start taking these medicines.        Dose/Directions    triamcinolone acetonide 40 MG/ML injection   Commonly known as:  KENALOG   Used for:  Patellar maltracking, right, Right anterior knee pain, Chondromalacia of right patellofemoral joint   Started by:  Will Maldonado,         Dose:  40 mg   1 mL (40 mg) by INTRA-ARTICULAR route once for 1 dose   Quantity:  1 mL   Refills:  0         These medicines have changed or have updated prescriptions.        Dose/Directions    pantoprazole 40 " MG EC tablet   Commonly known as:  PROTONIX   This may have changed:    - when to take this  - additional instructions   Used for:  Gastroesophageal reflux disease without esophagitis        Dose:  40 mg   Take 1 tablet (40 mg) by mouth daily Take 30-60 minutes before a meal.   Quantity:  30 tablet   Refills:  3            Where to get your medicines      Some of these will need a paper prescription and others can be bought over the counter.  Ask your nurse if you have questions.     You don't need a prescription for these medications     triamcinolone acetonide 40 MG/ML injection                Primary Care Provider Office Phone # Fax #    Aura EMILY Rosario PA-C [phone]       [address] [facility] [address]  [place] [address]        Equal Access to Services     VIKY VILLANUEVA : Ruth Chen, wajonnie kelly, qaybta kaalmada ellyn, susan santo . So [facility] [phone].    ATENCIÓN: Si habla español, tiene a kerr disposición servicios gratuitos de asistencia lingüística. [facility] [phone].    We comply with applicable federal civil rights laws and [place] laws. We do not discriminate on the basis of race, color, national origin, age, disability, sex, sexual orientation, or gender identity.            Thank you!     Thank you for choosing [facility]  for your care. Our goal is always to provide you with excellent care. Hearing back from our patients is one way we can continue to improve our services. Please take a few minutes to complete the written survey that you may receive in the mail after your visit with us. Thank you!             Your Updated Medication List - Protect others around you: Learn how to safely use, store and throw away your medicines at www.disposemymeds.org.          This list is accurate as of 9/5/18  3:17 PM.  Always use your most recent med list.                   Brand Name Dispense Instructions for use  Diagnosis    * albuterol 108 (90 Base) MCG/ACT inhaler    PROAIR HFA/PROVENTIL HFA/VENTOLIN HFA    3 Inhaler    Inhale 2 puffs into the lungs every 6 hours as needed for shortness of breath / dyspnea or wheezing    Mild persistent asthma without complication       * albuterol (2.5 MG/3ML) 0.083% neb solution     50 vial    Take 1 vial (2.5 mg) by nebulization every 6 hours as needed for shortness of breath / dyspnea or wheezing    Mild persistent asthma without complication       ARIPiprazole 15 MG tablet    ABILIFY    90 tablet    Take 1 tablet (15 mg) by mouth At Bedtime    Adjustment disorder with mixed anxiety and depressed mood, Anxiety       cetirizine 10 MG tablet    zyrTEC    90 tablet    Take 1 tablet (10 mg) by mouth daily    Mild persistent asthma without complication       cyclobenzaprine 10 MG tablet    FLEXERIL    60 tablet    Take 1 tablet (10 mg) by mouth 2 times daily as needed for muscle spasms or other (back pain)    Chronic bilateral low back pain with left-sided sciatica       dicyclomine 20 MG tablet    BENTYL    60 tablet    Take 1 tablet (20 mg) by mouth 4 times daily as needed (ABDOMINAL CRAMPING) AS NEEDED FOR CRAMPING    Hx of Crohn's disease       DULoxetine 60 MG EC capsule    CYMBALTA    90 capsule    Take 1 capsule (60 mg) by mouth daily    Adjustment disorder with mixed anxiety and depressed mood       EPINEPHrine 0.3 MG/0.3ML injection 2-pack    EPIPEN/ADRENACLICK/or ANY BX GENERIC EQUIV    0.6 mL    Inject 0.3 mLs (0.3 mg) into the muscle once as needed for anaphylaxis    Bee sting allergy       gabapentin 300 MG capsule    NEURONTIN    120 capsule    Take 1-3 capsules (300-900 mg) by mouth 3 times daily    Chronic bilateral low back pain with left-sided sciatica       HYDROcodone-acetaminophen 5-325 MG per tablet    NORCO     Take 1 tablet every 6 hours by oral route as needed for 15 days.    Nausea       LORazepam 0.5 MG tablet    ATIVAN     Take 0.5 mg by mouth 2 times daily         medical cannabis inhalation (Patient's own supply.  Not a prescription)      Inhale into the lungs 3 times daily as needed Reported on 3/28/2017        medroxyPROGESTERone 150 MG/ML injection    DEPO-PROVERA    3 mL    Inject 1 mL (150 mg) into the muscle every 3 months    Encounter for initial prescription of injectable contraceptive       naproxen 500 MG tablet    NAPROSYN    60 tablet    Take 1 tablet (500 mg) by mouth 2 times daily (with meals)    Chronic bilateral low back pain with left-sided sciatica       NONFORMULARY      Apply 30 mLs topically 4 times daily        nystatin cream    MYCOSTATIN     nystatin 100,000 unit/gram topical cream    Nausea       pantoprazole 40 MG EC tablet    PROTONIX    30 tablet    Take 1 tablet (40 mg) by mouth daily Take 30-60 minutes before a meal.    Gastroesophageal reflux disease without esophagitis       prazosin 1 MG capsule    MINIPRESS     Take 1 mg by mouth At Bedtime        promethazine 6.25 MG/5ML syrup    PHENERGAN    560 mL    Take 10 mLs (12.5 mg) by mouth 4 times daily as needed for nausea    Nausea       rizatriptan 5 MG ODT tab    MAXALT-MLT    18 tablet    Take 1-2 tablets (5-10 mg) by mouth at onset of headache for migraine May repeat in 2 hours. Max 6 tablets/24 hours.    Migraine with aura and without status migrainosus, not intractable       sucralfate 1 GM tablet    CARAFATE    60 tablet    Take 1 tablet (1 g) by mouth 4 times daily as needed    Gastroesophageal reflux disease without esophagitis       triamcinolone acetonide 40 MG/ML injection    KENALOG    1 mL    1 mL (40 mg) by INTRA-ARTICULAR route once for 1 dose    Patellar maltracking, right, Right anterior knee pain, Chondromalacia of right patellofemoral joint       varenicline 0.5 MG X 11 & 1 MG X 42 tablet    CHANTIX STARTING MONTH ILAN    53 tablet    Take 0.5 mg tab daily for 3 days, then 0.5 mg tab twice daily for 4 days, then 1 mg twice daily.    Tobacco use disorder       * Notice:   This list has 2 medication(s) that are the same as other medications prescribed for you. Read the directions carefully, and ask your doctor or other care provider to review them with you.

## 2018-09-05 NOTE — TELEPHONE ENCOUNTER
Reviewed [place]  09/05/18:  08/28/2018 Alprazolam 0.5 mg #60  08/17/2018 Lorazepam 0.5 mg #60  08/09/2018 Diazepam 10 mg #60    Per last Rx on 08/09/2018 should last until 09/08/2018 if she is taking as discussed with CDL at last visit on June.  She is to take one at night and one additional PRN during the day.   See that more recently she has received 2 additional benzodiazpine Rx from her other provider and in the past had only received one additional Rx of benzodiazepines.     Will forward this request to CDL to address on Monday 09/10/2018    Wilson Ibarra PA-C

## 2018-09-06 NOTE — PROGRESS NOTES
Late entry:  Patient returned to clinic requesting a GI cocktail and Zofran for ongoing nausea.   Huddled with CDL, ok for both.   RN administered GI cocktail, MA administered IM Zofran 8 mg VORB per CDL.     Ines Roy RN, BSN

## 2018-09-10 RX ORDER — DIAZEPAM 10 MG
10 TABLET ORAL EVERY 12 HOURS PRN
Qty: 60 TABLET | Refills: 1 | Status: SHIPPED | OUTPATIENT
Start: 2018-09-10 | End: 2018-10-08

## 2018-09-12 ENCOUNTER — MEDICAL CORRESPONDENCE (OUTPATIENT)
Dept: HEALTH INFORMATION MANAGEMENT | Facility: CLINIC | Age: 28
End: 2018-09-12

## 2018-09-13 ENCOUNTER — TELEPHONE (OUTPATIENT)
Dept: FAMILY MEDICINE | Facility: OTHER | Age: 28
End: 2018-09-13

## 2018-09-13 DIAGNOSIS — F33.41 MAJOR DEPRESSIVE DISORDER, RECURRENT EPISODE, IN PARTIAL REMISSION (H): ICD-10-CM

## 2018-09-13 DIAGNOSIS — Z79.899 HIGH RISK MEDICATION USE: Primary | ICD-10-CM

## 2018-09-13 LAB
CHOLEST SERPL-MCNC: 173 MG/DL
GLUCOSE SERPL-MCNC: 80 MG/DL (ref 70–99)
HBA1C MFR BLD: 5 % (ref 0–5.6)
HDLC SERPL-MCNC: 40 MG/DL
LDLC SERPL CALC-MCNC: 96 MG/DL
NONHDLC SERPL-MCNC: 133 MG/DL
TRIGL SERPL-MCNC: 184 MG/DL

## 2018-09-13 PROCEDURE — 80061 LIPID PANEL: CPT | Performed by: PSYCHIATRY & NEUROLOGY

## 2018-09-13 PROCEDURE — 36415 COLL VENOUS BLD VENIPUNCTURE: CPT | Performed by: PSYCHIATRY & NEUROLOGY

## 2018-09-13 PROCEDURE — 83036 HEMOGLOBIN GLYCOSYLATED A1C: CPT | Performed by: PSYCHIATRY & NEUROLOGY

## 2018-09-13 PROCEDURE — 82947 ASSAY GLUCOSE BLOOD QUANT: CPT | Performed by: PSYCHIATRY & NEUROLOGY

## 2018-09-13 NOTE — TELEPHONE ENCOUNTER
Pre-operative orders received from [facility]     They will be doing an SI joint fusion     Patient will need      CBC, BMP, INR, PTT and MRSA nare swab     Fax number:     [facility] [phone] AND      [facility] [phone]     Additional instructions      OK to take pain medications morning of      NO NSAIDs 1 week prior and 6 months after     Zach Rosario PA-C  [facility]

## 2018-09-23 ENCOUNTER — TRANSFERRED RECORDS (OUTPATIENT)
Dept: HEALTH INFORMATION MANAGEMENT | Facility: CLINIC | Age: 28
End: 2018-09-23

## 2018-09-26 DIAGNOSIS — M54.42 CHRONIC BILATERAL LOW BACK PAIN WITH LEFT-SIDED SCIATICA: ICD-10-CM

## 2018-09-26 DIAGNOSIS — G89.29 CHRONIC BILATERAL LOW BACK PAIN WITH LEFT-SIDED SCIATICA: ICD-10-CM

## 2018-09-27 RX ORDER — CYCLOBENZAPRINE HCL 10 MG
10 TABLET ORAL 2 TIMES DAILY PRN
Qty: 60 TABLET | Refills: 3 | Status: SHIPPED | OUTPATIENT
Start: 2018-09-27 | End: 2018-12-13

## 2018-09-27 NOTE — TELEPHONE ENCOUNTER
Flexeril      Last Written Prescription Date:  6/12/2018  Last Fill Quantity: 60,   # refills: 3  Last Office Visit: 8/21/2018  Future Office visit:    Next 5 appointments (look out 90 days)     Oct 08, 2018  8:30 AM CDT   Pre-Op physical with Aura Rosario PA-C   [facility]    [address] [place] [id number] [phone]                   Routing refill request to provider for review/approval because:  Drug not on the [facility], [facility] or  Health refill protocol or controlled substance    Claudine Barbara, RN, BSN

## 2018-10-02 ENCOUNTER — TRANSFERRED RECORDS (OUTPATIENT)
Dept: HEALTH INFORMATION MANAGEMENT | Facility: CLINIC | Age: 28
End: 2018-10-02

## 2018-10-02 NOTE — PATIENT INSTRUCTIONS
- No Naproxen for 3 days before surgery         Before Your Surgery      Call your surgeon if there is any change in your health. This includes signs of a cold or flu (such as a sore throat, runny nose, cough, rash or fever).    Do not smoke, drink alcohol or take over the counter medicine (unless your surgeon or primary care doctor tells you to) for the 24 hours before and after surgery.    If you take prescribed drugs: Follow your doctor s orders about which medicines to take and which to stop until after surgery.    Eating and drinking prior to surgery: follow the instructions from your surgeon    Take a shower or bath the night before surgery. Use the soap your surgeon gave you to gently clean your skin. If you do not have soap from your surgeon, use your regular soap. Do not shave or scrub the surgery site.  Wear clean pajamas and have clean sheets on your bed.

## 2018-10-02 NOTE — PROGRESS NOTES
[address]  [facility] [address]  [phone]  Dept: [phone]    PRE-OP EVALUATION:  Today's date: 10/8/2018    Katy Islas (: 1990) presents for pre-operative evaluation assessment as requested by Dr. Jeniffer Eugene.  She requires evaluation and anesthesia risk assessment prior to undergoing surgery/procedure for treatment of SI Joint dysfunction.    Fax number for surgical facility: [phone]  Primary Physician: Aura Rosario  Type of Anesthesia Anticipated: General    Patient has a Health Care Directive or Living Will:  NO    Preop Questions 10/8/2018   Who is doing your surgery? dr jeniffer james   What are you having done? si joint fusion   Date of Surgery/Procedure: Oct 26    Facility or Hospital where procedure/surgery will be performed: [facility]    1.  Do you have a history of Heart attack, stroke, stent, coronary bypass surgery, or other heart surgery? No   2.  Do you ever have any pain or discomfort in your chest? No   3.  Do you have a history of  Heart Failure? No   4.   Are you troubled by shortness of breath when:  walking on a level surface, or up a slight hill, or at night? No   5.  Do you currently have a cold, bronchitis or other respiratory infection? No   6.  Do you have a cough, shortness of breath, or wheezing? No   7.  Do you sometimes get pains in the calves of your legs when you walk? No   8. Do you or anyone in your family have previous history of blood clots? No   9.  Do you or does anyone in your family have a serious bleeding problem such as prolonged bleeding following surgeries or cuts? No   10. Have you ever had problems with anemia or been told to take iron pills? No   11. Have you had any abnormal blood loss such as black, tarry or bloody stools, or abnormal vaginal bleeding? No   12. Have you ever had a blood transfusion? No   13. Have you or any of your relatives ever had problems with anesthesia?  No   14. Do you have sleep apnea, excessive snoring or daytime drowsiness? No   15. Do you have any prosthetic heart valves? No   16. Do you have prosthetic joints? No   17. Is there any chance that you may be pregnant? No         HPI:     HPI related to upcoming procedure: Patient with complex medical history in regards to her back. Patient first had laminectomy, followed by fusion, she then fell and had coccygeal fracture with subsequent coccyx removal surgery. This surgery resulted in need for wound vac due to tunneling. During all this, patient continued to have back pain that didn't resolve. She also continued physical therapy during this time. She was found to have SI joint dysfunction. Back specialist recommended fusion surgery after failing conservative measures including physical therapy and injections.     See problem list for active medical problems.  Problems all longstanding and stable, except as noted/documented.  See ROS for pertinent symptoms related to these conditions.                                                                                                                                                            ASTHMA - Patient has a history of asthma . Patient has been doing well overall noting NO SYMPTOMS and continues on medication regimen consisting of albuterol without adverse reactions or side effects.                                                                                                                                                DEPRESSION - Patient has a history of depression requiring medication for control with recent symptoms being stable. Current symptoms of depression include none.                                                                                                                                                                                   MEDICAL HISTORY:     Patient Active Problem List    Diagnosis Date Noted     Patellar maltracking, right  09/05/2018     Priority: Medium     Right anterior knee pain 09/05/2018     Priority: Medium     Melanocytic nevus, unspecified location 07/23/2018     Priority: Medium     Dysplastic skin lesion 07/23/2018     Priority: Medium     Iliotibial band syndrome affecting lower leg, right 12/21/2017     Priority: Medium     Chondromalacia of right patellofemoral joint 12/21/2017     Priority: Medium     Postoperative pain 11/13/2017     Priority: Medium     Closed fracture of coccyx with nonunion, subsequent encounter 10/12/2017     Priority: Medium     Upper GI bleed - suspected 07/01/2017     Priority: Medium     Tobacco use disorder 07/01/2017     Priority: Medium     History of pseudoseizure 07/01/2017     Priority: Medium     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) 03/10/2017     Priority: Medium     Lumbar disc disease with radiculopathy 02/23/2017     Priority: Medium     S/P lumbar fusion 02/23/2017     Priority: Medium     Dr. YOVANI Millan, 2/23/17       Vitamin D deficiency 01/06/2017     Priority: Medium     Atypical mole 01/06/2017     Priority: Medium     Mild persistent asthma without complication 12/02/2016     Priority: Medium     Chronic bilateral low back pain with left-sided sciatica 12/02/2016     Priority: Medium     Bee sting allergy 09/16/2016     Priority: Medium     Gastroesophageal reflux disease without esophagitis 08/26/2016     Priority: Medium     Herpes simplex virus infection 11/12/2015     Priority: Medium     Adjustment disorder with mixed anxiety and depressed mood 10/14/2015     Priority: Medium     Marijuana abuse 02/22/2015     Priority: Medium     Grand mal seizure disorder (H) 10/08/2013     Priority: Medium     Bipolar disorder (H) 01/31/2013     Priority: Medium     Problem list name updated by automated process. Provider to review       Nausea and vomiting 12/13/2012     Priority: Medium     Oral thrush 06/25/2012     Priority: Medium     Chronic pain 02/09/2012      Priority: Medium     Patient is followed by Aura Rosario PA-C for ongoing prescription of pain medication.  All refills should only be approved by this provider, or covering partner.    Medication(s): Norco    Maximum quantity per month: 70  Clinic visit frequency required: Q 2 months     Controlled substance agreement:   Discussed and signed 4/25/17    Encounter-Level CSA - 01/12/2015:                 Controlled Substance Agreement - Scan on 1/26/2015  9:14 AM : Controlled Medication Agreement-01/12/15 (below)            Pain Clinic evaluation in the past: Yes       Date/Location:   [facility], fall 2016    DIRE Total Score(s):    4/25/2017   Total Score 17       Last [facility] website verification:  done on 4/25/17 by LOVE Maki   [email or web address]           Anxiety 09/22/2011     Priority: Medium     Mild major depression (H) 08/29/2011     Priority: Medium     Crohn's colitis (H) 08/04/2009     Priority: Medium     Dysmenorrhea 05/11/2007     Priority: Medium     Benign neoplasm of skin of lower limb, including hip 03/16/2004     Priority: Medium     Migraine      Priority: Medium     Dr. Farrar - Neurology.  Now on Inderal.  Seems to be working.  Follow-up 9/08  Problem list name updated by automated process. Provider to review       Hearing loss      Priority: Medium     Congenital  Problem list name updated by automated process. Provider to review       Allergic rhinitis      Priority: Medium     Problem list name updated by automated process. Provider to review        Past Medical History:   Diagnosis Date     Abdominal pain 10/31- 11/4/2005    [facility] admit for Crohn's     Allergic rhinitis, cause unspecified     Allergic rhinitis     Arthritis      C. difficile diarrhea     Past, no current diarrhea.     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, without mention of intractable epilepsy     pseudoseizures diagnosed after extensive neurologic eval     Migraine 07/21/12    D/C 07/22/12-[facility]     Migraine, unspecified, without mention of intractable migraine without mention of status migrainosus     Migraine     Mild intermittent asthma     mild intermittent     Mycoplasma infection in conditions classified elsewhere and of unspecified site      Other chronic pain     Back pain for 6 years     Renal disease     Kidney stones     Unspecified hearing loss     congenital hearing loss     Past Surgical History:   Procedure Laterality Date     COLONOSCOPY  7/1/2009    [facility], hospitals.     FUSION SPINE POSTERIOR MINIMALLY INVASIVE ONE LEVEL N/A 2/23/2017    Procedure: FUSION SPINE POSTERIOR MINIMALLY INVASIVE ONE LEVEL;  L4-5 Oblique Lateral Lumbar Interbody Fusion   Epidural steroid injection.   Transpedicular Bone marrow aspiration;  Surgeon: Jeniffer Eugnee MD;  Location: [facility] OR     HC COLONOSCOPY THRU STOMA WITH BIOPSY  10/29/2003    Impression is that of normal appearing colonoscopy, without evidence of rectal bleeding.     HC COLONOSCOPY THRU STOMA, DIAGNOSTIC  10/00    normal     HC COLONOSCOPY THRU STOMA, DIAGNOSTIC  Oct 2009    Dr López- normal     HC EEG AWAKE AND SLEEP      abnormal     HC MRI BRAIN W/O CONTRAST  12/00    normal     HC REMOVAL GALLBLADDER  8/5/2009    [facility], hospitals.     HC UGI ENDOSCOPY DIAG W BIOPSY  11/11/09    Normal esophagus     HC UGI ENDOSCOPY, SIMPLE EXAM  7/00, 10/00    mild chronic esophagitis and duodenitis, neg H pylori     HC UGI ENDOSCOPY, SIMPLE EXAM  01/20/2005    Esophagogastroduodenoscopy, colonoscopy with  biopsies.  [facility] UGI ENDOSCOPY, SIMPLE EXAM  7/1/2009    [facility] [place]      UGI ENDOSCOPY, SIMPLE EXAM  11/11/2009    attempted upper GI, pt. could not tolerate procedure:[facility]     ORTHOPEDIC SURGERY  October 19,2011    diskectomy L4-L5     Current Outpatient Prescriptions   Medication Sig Dispense Refill     albuterol (2.5 MG/3ML) 0.083% neb solution Take 1 vial (2.5 mg) by nebulization every 6 hours as needed for shortness of breath / dyspnea or wheezing 50 vial 11     albuterol (PROAIR HFA/PROVENTIL HFA/VENTOLIN HFA) 108 (90 Base) MCG/ACT Inhaler Inhale 2 puffs into the lungs every 6 hours as needed for shortness of breath / dyspnea or wheezing 3 Inhaler 3     ARIPiprazole (ABILIFY) 15 MG tablet Take 1 tablet (15 mg) by mouth At Bedtime 90 tablet 3     cetirizine (ZYRTEC) 10 MG tablet Take 1 tablet (10 mg) by mouth daily 90 tablet 3     cyclobenzaprine (FLEXERIL) 10 MG tablet Take 1 tablet (10 mg) by mouth 2 times daily as needed for muscle spasms or other (back pain) 60 tablet 3     diazepam (VALIUM) 10 MG tablet Take 1 tablet (10 mg) by mouth every 12 hours as needed for anxiety or sleep (MUSCLE SPASM) (one month supply) 60 tablet 1     dicyclomine (BENTYL) 20 MG tablet Take 1 tablet (20 mg) by mouth 4 times daily as needed (ABDOMINAL CRAMPING) AS NEEDED FOR CRAMPING 60 tablet 6     DULoxetine (CYMBALTA) 60 MG EC capsule Take 1 capsule (60 mg) by mouth daily 90 capsule 3     EPINEPHrine (EPIPEN/ADRENACLICK/OR ANY BX GENERIC EQUIV) 0.3 MG/0.3ML injection 2-pack Inject 0.3 mLs (0.3 mg) into the muscle once as needed for anaphylaxis 0.6 mL 1     gabapentin (NEURONTIN) 300 MG capsule Take 1-3 capsules (300-900 mg) by mouth 3 times daily 120 capsule 3     HYDROcodone-acetaminophen (NORCO) 5-325 MG per tablet Take 1 tablet every 6 hours by oral route as needed for 15 days.       LORazepam (ATIVAN) 0.5 MG tablet Take 0.5 mg by mouth 2 times daily  1      medical cannabis inhalation (Patient's own supply.  Not a prescription) Inhale into the lungs 3 times daily as needed Reported on 3/28/2017       medroxyPROGESTERone (DEPO-PROVERA) 150 MG/ML injection Inject 1 mL (150 mg) into the muscle every 3 months 3 mL 3     naproxen (NAPROSYN) 500 MG tablet Take 1 tablet (500 mg) by mouth 2 times daily (with meals) 60 tablet 1     NONFORMULARY Apply 30 mLs topically 4 times daily       pantoprazole (PROTONIX) 40 MG EC tablet Take 1 tablet (40 mg) by mouth daily Take 30-60 minutes before a meal. (Patient taking differently: Take 40 mg by mouth 2 times daily Take 30-60 minutes before a meal.) 30 tablet 3     promethazine (PHENERGAN) 6.25 MG/5ML syrup Take 10 mLs (12.5 mg) by mouth 4 times daily as needed for nausea 560 mL 3     rizatriptan (MAXALT-MLT) 5 MG ODT tab Take 1-2 tablets (5-10 mg) by mouth at onset of headache for migraine May repeat in 2 hours. Max 6 tablets/24 hours. 18 tablet 3     busPIRone (BUSPAR) 10 MG tablet   5     nystatin (MYCOSTATIN) cream nystatin 100,000 unit/gram topical cream       prazosin (MINIPRESS) 1 MG capsule Take 1 mg by mouth At Bedtime  5     sucralfate (CARAFATE) 1 GM tablet Take 1 tablet (1 g) by mouth 4 times daily as needed (Patient not taking: Reported on 10/8/2018) 60 tablet 1     varenicline (CHANTIX STARTING MONTH PAK) 0.5 MG X 11 & 1 MG X 42 tablet Take 0.5 mg tab daily for 3 days, then 0.5 mg tab twice daily for 4 days, then 1 mg twice daily. (Patient not taking: Reported on 10/8/2018) 53 tablet 0     OTC products: None, except as noted above    Allergies   Allergen Reactions     Ativan [Lorazepam] Hives     At the IV site     Baclofen      hives     Bees Hives, Swelling and Difficulty breathing     Caffeine      Contrast Dye      Hives,   Updated 5/10/2016 CT Contrast.     Iodine Hives     Methocarbamol Swelling     Metoclopramide      Other reaction(s): Tremors  LW Reaction: shaking/sweating     Midazolam      Other  reaction(s): Agitation     Monosodium Glutamate      Morphine Other (See Comments)     Difficulty with urination     Nsaids Other (See Comments)     GI BLEED x2     Other (Do Not Use) Other (See Comments)     Xanaflex- pt becomes disoriented and loses bladder control     Reglan [Metoclopramide Hcl] Other (See Comments)     shaking     Soma [Carisoprodol] Visual Disturbance     Sleep walking     Tizanidine      Topamax Other (See Comments)     Topamax [Topiramate] Nausea     Tingling  GI/Vomit     Tramadol      Severe Headache, Seizure Risk     Tylenol W/Codeine [Acetaminophen-Codeine] Nausea and Itching     Tylenol 3     Valium Other (See Comments)     Becomes aggressive and angry     Versed Other (See Comments)     Zolmitriptan      Makes face feel like its twitching     Droperidol Anxiety     Flu Virus Vaccine Rash      Arm swelling      Latex Allergy: NO    Social History   Substance Use Topics     Smoking status: Former Smoker     Packs/day: 0.50     Types: Cigarettes     Smokeless tobacco: Never Used      Comment: quit July 2018 - 4 days no cig     Alcohol use No      Comment: once in while      History   Drug Use     Yes     Special: Marijuana     Comment: Medical Marijuana currently       REVIEW OF SYSTEMS:   Constitutional, neuro, ENT, endocrine, pulmonary, cardiac, gastrointestinal, genitourinary, musculoskeletal, integument and psychiatric systems are negative, except as otherwise noted.    EXAM:   /72  Pulse 100  Temp 98.2  F (36.8  C) (Temporal)  Resp 18  Wt 168 lb 12.8 oz (76.6 kg)  LMP  (LMP Unknown)  SpO2 100%  BMI 31.46 kg/m2    GENERAL APPEARANCE: healthy, alert and no distress     EYES: EOMI, PERRL     HENT: ear canals and TM's normal and nose and mouth without ulcers or lesions     NECK: no adenopathy, no asymmetry, masses, or scars and thyroid normal to palpation     RESP: lungs clear to auscultation - no rales, rhonchi or wheezes     CV: regular rates and rhythm, normal S1 S2, no S3  or S4 and no murmur, click or rub     ABDOMEN:  soft, nontender, no HSM or masses and bowel sounds normal     MS: extremities normal- no gross deformities noted, no evidence of inflammation in joints, FROM in all extremities.     SKIN: no suspicious lesions or rashes     NEURO: Normal strength and tone, sensory exam grossly normal, mentation intact and speech normal     PSYCH: mentation appears normal. and affect normal/bright     LYMPHATICS: No cervical adenopathy    DIAGNOSTICS:     EKG: Not indicated due to non-vascular surgery and low risk of event (age <65 and without cardiac risk factors)    Hemoglobin (indicated for history of anemia or procedure with significant blood loss such as tonsillectomy, major intraperitoneal surgery, vascular surgery, major spine surgery, total joint replacement)    Labs Resulted Today:   Results for orders placed or performed in visit on 10/08/18   Hemoglobin   Result Value Ref Range    Hemoglobin 13.3 11.7 - 15.7 g/dL     *Note: Due to a large number of results and/or encounters for the requested time period, some results have not been displayed. A complete set of results can be found in Results Review.       Recent Labs   Lab Test  09/13/18   1431  08/20/18   1805 08/08/18 07/16/18   1852   10/12/17   1358   02/13/17   1114   HGB   --   13.6   --   13.2   < >  12.9   < >  14.2   PLT   --   341   --   238   < >  249   < >  279   INR   --    --    --    --    --   1.11   --   1.00   NA   --   142   --   143   < >  140   < >  139   POTASSIUM   --   4.0  4.5  4.2   < >  3.9   < >  4.4   CR   --   0.85  1.18  0.91   < >  0.73   < >  0.75   A1C  5.0   --    --    --    --    --    --    --     < > = values in this interval not displayed.        IMPRESSION:   Reason for surgery/procedure: SI joint dysfunction, chronic pain   Diagnosis/reason for consult: Pre operative consult     The proposed surgical procedure is considered INTERMEDIATE risk.    REVISED CARDIAC RISK INDEX  The patient  has the following serious cardiovascular risks for perioperative complications such as (MI, PE, VFib and 3  AV Block):  No serious cardiac risks  INTERPRETATION: 0 risks: Class I (very low risk - 0.4% complication rate)    The patient has the following additional risks for perioperative complications:  High tolerance to opioid analgesics due to chronic use       ICD-10-CM    1. Preop general physical exam Z01.818 Hemoglobin   2. Chronic pain syndrome G89.4 diazepam (VALIUM) 10 MG tablet   3. S/P lumbar fusion Z98.1 diazepam (VALIUM) 10 MG tablet   4. SI (sacroiliac) joint dysfunction M53.3    5. Crohn's disease of colon with other complication (H) K50.118    6. Bipolar disorder, current episode mixed, severe, without psychotic features (H) F31.63    7. Mild major depression (H) F32.0    8. Anxiety F41.9    9. Grand mal seizure disorder (H) G40.409    10. Mild persistent asthma without complication J45.30 cetirizine (ZYRTEC) 10 MG tablet   11. Upper GI bleed - suspected K92.2    12. Chronic bilateral low back pain with left-sided sciatica M54.42 diazepam (VALIUM) 10 MG tablet    G89.29    13. Lumbar disc disease with radiculopathy M51.16 diazepam (VALIUM) 10 MG tablet   14. Migraine with aura and without status migrainosus, not intractable G43.109 rizatriptan (MAXALT-MLT) 5 MG ODT tab     ondansetron (ZOFRAN) 8 MG tablet     ondansetron (ZOFRAN) 4 MG tablet       RECOMMENDATIONS:   --Patient is to take all scheduled medications on the day of surgery EXCEPT for modifications listed below.    Anticoagulant or Antiplatelet Medication Use  NSAIDS: Naproxen (Naprosyn):   Stop 2-3 days prior to surgery        APPROVAL GIVEN to proceed with proposed procedure, without further diagnostic evaluation       Signed Electronically by: Aura Rosario PA-C    Copy of this evaluation report is provided to requesting physician.    [place] Preop Guidelines    Revised Cardiac Risk Index

## 2018-10-08 ENCOUNTER — OFFICE VISIT (OUTPATIENT)
Dept: FAMILY MEDICINE | Facility: OTHER | Age: 28
End: 2018-10-08
Payer: MEDICARE

## 2018-10-08 VITALS
HEART RATE: 100 BPM | WEIGHT: 168.8 LBS | OXYGEN SATURATION: 100 % | SYSTOLIC BLOOD PRESSURE: 118 MMHG | TEMPERATURE: 98.2 F | RESPIRATION RATE: 18 BRPM | DIASTOLIC BLOOD PRESSURE: 72 MMHG | BODY MASS INDEX: 31.46 KG/M2

## 2018-10-08 DIAGNOSIS — M51.16 LUMBAR DISC DISEASE WITH RADICULOPATHY: ICD-10-CM

## 2018-10-08 DIAGNOSIS — J45.30 MILD PERSISTENT ASTHMA WITHOUT COMPLICATION: ICD-10-CM

## 2018-10-08 DIAGNOSIS — F31.63 BIPOLAR DISORDER, CURRENT EPISODE MIXED, SEVERE, WITHOUT PSYCHOTIC FEATURES (H): ICD-10-CM

## 2018-10-08 DIAGNOSIS — M53.3 SI (SACROILIAC) JOINT DYSFUNCTION: ICD-10-CM

## 2018-10-08 DIAGNOSIS — Z01.818 PREOP GENERAL PHYSICAL EXAM: Primary | ICD-10-CM

## 2018-10-08 DIAGNOSIS — F41.9 ANXIETY: ICD-10-CM

## 2018-10-08 DIAGNOSIS — G89.29 CHRONIC BILATERAL LOW BACK PAIN WITH LEFT-SIDED SCIATICA: ICD-10-CM

## 2018-10-08 DIAGNOSIS — G43.109 MIGRAINE WITH AURA AND WITHOUT STATUS MIGRAINOSUS, NOT INTRACTABLE: ICD-10-CM

## 2018-10-08 DIAGNOSIS — M54.42 CHRONIC BILATERAL LOW BACK PAIN WITH LEFT-SIDED SCIATICA: ICD-10-CM

## 2018-10-08 DIAGNOSIS — K50.118 CROHN'S DISEASE OF COLON WITH OTHER COMPLICATION (H): ICD-10-CM

## 2018-10-08 DIAGNOSIS — G89.4 CHRONIC PAIN SYNDROME: ICD-10-CM

## 2018-10-08 DIAGNOSIS — G40.409 GRAND MAL SEIZURE DISORDER (H): ICD-10-CM

## 2018-10-08 DIAGNOSIS — K92.2 UPPER GI BLEED: ICD-10-CM

## 2018-10-08 DIAGNOSIS — Z98.1 S/P LUMBAR FUSION: ICD-10-CM

## 2018-10-08 DIAGNOSIS — F32.0 MILD MAJOR DEPRESSION (H): ICD-10-CM

## 2018-10-08 LAB — HGB BLD-MCNC: 13.3 G/DL (ref 11.7–15.7)

## 2018-10-08 PROCEDURE — 36415 COLL VENOUS BLD VENIPUNCTURE: CPT | Performed by: PHYSICIAN ASSISTANT

## 2018-10-08 PROCEDURE — 99214 OFFICE O/P EST MOD 30 MIN: CPT | Performed by: PHYSICIAN ASSISTANT

## 2018-10-08 PROCEDURE — 85018 HEMOGLOBIN: CPT | Performed by: PHYSICIAN ASSISTANT

## 2018-10-08 RX ORDER — BUSPIRONE HYDROCHLORIDE 10 MG/1
TABLET ORAL
Refills: 5 | COMMUNITY
Start: 2018-08-28 | End: 2019-01-21

## 2018-10-08 RX ORDER — RIZATRIPTAN BENZOATE 5 MG/1
5-10 TABLET, ORALLY DISINTEGRATING ORAL
Qty: 18 TABLET | Refills: 3 | Status: SHIPPED | OUTPATIENT
Start: 2018-10-08 | End: 2019-09-04

## 2018-10-08 RX ORDER — CETIRIZINE HYDROCHLORIDE 10 MG/1
10 TABLET ORAL DAILY
Qty: 90 TABLET | Refills: 3 | Status: SHIPPED | OUTPATIENT
Start: 2018-10-08 | End: 2019-04-11

## 2018-10-08 RX ORDER — ONDANSETRON 8 MG/1
8 TABLET, FILM COATED ORAL ONCE
Qty: 1 TABLET | Refills: 0 | Status: SHIPPED | OUTPATIENT
Start: 2018-10-08 | End: 2018-10-08

## 2018-10-08 RX ORDER — DIAZEPAM 10 MG
10 TABLET ORAL EVERY 12 HOURS PRN
Qty: 60 TABLET | Refills: 1 | Status: SHIPPED | OUTPATIENT
Start: 2018-10-08 | End: 2018-12-13

## 2018-10-08 RX ORDER — ONDANSETRON 4 MG/1
4-8 TABLET, FILM COATED ORAL EVERY 6 HOURS PRN
Qty: 30 TABLET | Refills: 5 | Status: SHIPPED | OUTPATIENT
Start: 2018-10-08 | End: 2019-01-21

## 2018-10-08 ASSESSMENT — PAIN SCALES - GENERAL: PAINLEVEL: MODERATE PAIN (5)

## 2018-10-08 NOTE — PROGRESS NOTES
Savana Burns    Your results were normal. OK to proceed with surgery.     The results are attached for your review.       Zach Rosario PA-C

## 2018-10-08 NOTE — MR AVS SNAPSHOT
After Visit Summary   10/8/2018    Katy Islas    MRN: [id number]           Patient Information     Date Of Birth          1990        Visit Information        Provider Department      10/8/2018 8:30 AM Aura Rosario PA-C; [facility]        Today's Diagnoses     Preop general physical exam    -  1    Chronic pain syndrome        S/P lumbar fusion        SI (sacroiliac) joint dysfunction        Crohn's disease of colon with other complication (H)        Bipolar disorder, current episode mixed, severe, without psychotic features (H)        Mild major depression (H)        Anxiety        Grand mal seizure disorder (H)        Mild persistent asthma without complication        Upper GI bleed - suspected        Chronic bilateral low back pain with left-sided sciatica        Lumbar disc disease with radiculopathy        Migraine with aura and without status migrainosus, not intractable          Care Instructions      - No Naproxen for 3 days before surgery         Before Your Surgery      Call your surgeon if there is any change in your health. This includes signs of a cold or flu (such as a sore throat, runny nose, cough, rash or fever).    Do not smoke, drink alcohol or take over the counter medicine (unless your surgeon or primary care doctor tells you to) for the 24 hours before and after surgery.    If you take prescribed drugs: Follow your doctor s orders about which medicines to take and which to stop until after surgery.    Eating and drinking prior to surgery: follow the instructions from your surgeon    Take a shower or bath the night before surgery. Use the soap your surgeon gave you to gently clean your skin. If you do not have soap from your surgeon, use your regular soap. Do not shave or scrub the surgery site.  Wear clean pajamas and have clean sheets on your bed.           Follow-ups after your visit        Who to contact     If you have  questions or need follow up information about today's clinic visit or your schedule please contact [facility] directly at [phone].  Normal or non-critical lab and imaging results will be communicated to you by MyChart, letter or phone within 4 business days after the clinic has received the results. If you do not hear from us within 7 days, please contact the clinic through [employer] or phone. If you have a critical or abnormal lab result, we will notify you by phone as soon as possible.  Submit refill requests through [employer] or call your pharmacy and they will forward the refill request to us. Please allow 3 business days for your refill to be completed.          Additional Information About Your Visit        [email or web address] Information     [employer] gives you secure access to your electronic health record. If you see a primary care provider, you can also send messages to your care team and make appointments. If you have questions, please call your primary care clinic.  If you do not have a primary care provider, please call [phone] and they will assist you.        Care EveryWhere ID     This is your Care EveryWhere ID. This could be used by other organizations to access your [place] medical records  [id number]        Your Vitals Were     Pulse Temperature Respirations Last Period Pulse Oximetry BMI (Body Mass Index)    100 98.2  F (36.8  C) (Temporal) 18 (LMP Unknown) 100% 31.46 kg/m2       Blood Pressure from Last 3 Encounters:   10/08/18 118/72   09/05/18 100/64   08/27/18 112/78    Weight from Last 3 Encounters:   10/08/18 168 lb 12.8 oz (76.6 kg)   09/05/18 167 lb (75.8 kg)   08/27/18 (P) 169 lb (76.7 kg)              We Performed the Following     Hemoglobin          Today's Medication Changes          These changes are accurate as of 10/8/18  9:00 AM.  If you have any questions, ask your nurse or doctor.               Start taking these medicines.        Dose/Directions    * ondansetron 8  MG tablet   Commonly known as:  ZOFRAN   Used for:  Migraine with aura and without status migrainosus, not intractable   Started by:  Aura Rosario PA-C        Dose:  8 mg   Take 1 tablet (8 mg) by mouth once for 1 dose   Quantity:  1 tablet   Refills:  0       * ondansetron 4 MG tablet   Commonly known as:  ZOFRAN   Used for:  Migraine with aura and without status migrainosus, not intractable   Started by:  Aura Rosario PA-C        Dose:  4-8 mg   Take 1-2 tablets (4-8 mg) by mouth every 6 hours as needed for nausea   Quantity:  30 tablet   Refills:  5       * Notice:  This list has 2 medication(s) that are the same as other medications prescribed for you. Read the directions carefully, and ask your doctor or other care provider to review them with you.      These medicines have changed or have updated prescriptions.        Dose/Directions    pantoprazole 40 MG EC tablet   Commonly known as:  PROTONIX   This may have changed:    - when to take this  - additional instructions   Used for:  Gastroesophageal reflux disease without esophagitis        Dose:  40 mg   Take 1 tablet (40 mg) by mouth daily Take 30-60 minutes before a meal.   Quantity:  30 tablet   Refills:  3            Where to get your medicines      These medications [facility] to [facility] [address] [place] [address] AT [address] [address] [address] [facility] [address]     Phone:  [phone]     cetirizine 10 MG tablet    ondansetron 4 MG tablet    ondansetron 8 MG tablet    rizatriptan 5 MG ODT tab         Some of these will need a paper prescription and others can be bought over the counter.  Ask your nurse if you have questions.     Bring a paper prescription for each of these medications     diazepam 10 MG tablet                Primary Care Provider Office Phone # Fax #    Aura Rosario PA-C [phone]       [address] [place] [address]  [address]  [facility] [address]        Equal Access to Services     VIKY VILLANUEVA : Hadii pilo ku hadpeteo Sojannetteali, waaxda luqadaha, qaybta kadarlingduarte thorntonyasmeenduarte, susan tomlinluis mlakshmi turner. So [facility] [phone].    ATENCIÓN: Si habla español, tiene a kerr disposición servicios gratuitos de asistencia lingüística. Llame al [phone].    We comply with applicable federal civil rights laws and [place] laws. We do not discriminate on the basis of race, color, national origin, age, disability, sex, sexual orientation, or gender identity.            Thank you!     Thank you for choosing [facility]  for your care. Our goal is always to provide you with excellent care. Hearing back from our patients is one way we can continue to improve our services. Please take a few minutes to complete the written survey that you may receive in the mail after your visit with us. Thank you!             Your Updated Medication List - Protect others around you: Learn how to safely use, store and throw away your medicines at www.disposemymeds.org.          This list is accurate as of 10/8/18  9:00 AM.  Always use your most recent med list.                   Brand Name Dispense Instructions for use Diagnosis    * albuterol 108 (90 Base) MCG/ACT inhaler    PROAIR HFA/PROVENTIL HFA/VENTOLIN HFA    3 Inhaler    Inhale 2 puffs into the lungs every 6 hours as needed for shortness of breath / dyspnea or wheezing    Mild persistent asthma without complication       * albuterol (2.5 MG/3ML) 0.083% neb solution     50 vial    Take 1 vial (2.5 mg) by nebulization every 6 hours as needed for shortness of breath / dyspnea or wheezing    Mild persistent asthma without complication       ARIPiprazole 15 MG tablet    ABILIFY    90 tablet    Take 1 tablet (15 mg) by mouth At Bedtime    Adjustment disorder with mixed anxiety and depressed mood, Anxiety       busPIRone 10 MG tablet    BUSPAR          cetirizine 10 MG tablet    zyrTEC     90 tablet    Take 1 tablet (10 mg) by mouth daily    Mild persistent asthma without complication       cyclobenzaprine 10 MG tablet    FLEXERIL    60 tablet    Take 1 tablet (10 mg) by mouth 2 times daily as needed for muscle spasms or other (back pain)    Chronic bilateral low back pain with left-sided sciatica       diazepam 10 MG tablet    VALIUM    60 tablet    Take 1 tablet (10 mg) by mouth every 12 hours as needed for anxiety or sleep (MUSCLE SPASM) (one month supply)    Chronic pain syndrome, Chronic bilateral low back pain with left-sided sciatica, Lumbar disc disease with radiculopathy, S/P lumbar fusion       dicyclomine 20 MG tablet    BENTYL    60 tablet    Take 1 tablet (20 mg) by mouth 4 times daily as needed (ABDOMINAL CRAMPING) AS NEEDED FOR CRAMPING    Hx of Crohn's disease       DULoxetine 60 MG EC capsule    CYMBALTA    90 capsule    Take 1 capsule (60 mg) by mouth daily    Adjustment disorder with mixed anxiety and depressed mood       EPINEPHrine 0.3 MG/0.3ML injection 2-pack    EPIPEN/ADRENACLICK/or ANY BX GENERIC EQUIV    0.6 mL    Inject 0.3 mLs (0.3 mg) into the muscle once as needed for anaphylaxis    Bee sting allergy       gabapentin 300 MG capsule    NEURONTIN    120 capsule    Take 1-3 capsules (300-900 mg) by mouth 3 times daily    Chronic bilateral low back pain with left-sided sciatica       HYDROcodone-acetaminophen 5-325 MG per tablet    NORCO     Take 1 tablet every 6 hours by oral route as needed for 15 days.    Nausea       LORazepam 0.5 MG tablet    ATIVAN     Take 0.5 mg by mouth 2 times daily        medical cannabis inhalation (Patient's own supply.  Not a prescription)      Inhale into the lungs 3 times daily as needed Reported on 3/28/2017        medroxyPROGESTERone 150 MG/ML injection    DEPO-PROVERA    3 mL    Inject 1 mL (150 mg) into the muscle every 3 months    Encounter for initial prescription of injectable contraceptive       naproxen 500 MG tablet    NAPROSYN     60 tablet    Take 1 tablet (500 mg) by mouth 2 times daily (with meals)    Chronic bilateral low back pain with left-sided sciatica       NONFORMULARY      Apply 30 mLs topically 4 times daily        nystatin cream    MYCOSTATIN     nystatin 100,000 unit/gram topical cream    Nausea       * ondansetron 8 MG tablet    ZOFRAN    1 tablet    Take 1 tablet (8 mg) by mouth once for 1 dose    Migraine with aura and without status migrainosus, not intractable       * ondansetron 4 MG tablet    ZOFRAN    30 tablet    Take 1-2 tablets (4-8 mg) by mouth every 6 hours as needed for nausea    Migraine with aura and without status migrainosus, not intractable       pantoprazole 40 MG EC tablet    PROTONIX    30 tablet    Take 1 tablet (40 mg) by mouth daily Take 30-60 minutes before a meal.    Gastroesophageal reflux disease without esophagitis       prazosin 1 MG capsule    MINIPRESS     Take 1 mg by mouth At Bedtime        promethazine 6.25 MG/5ML syrup    PHENERGAN    560 mL    Take 10 mLs (12.5 mg) by mouth 4 times daily as needed for nausea    Nausea       rizatriptan 5 MG ODT tab    MAXALT-MLT    18 tablet    Take 1-2 tablets (5-10 mg) by mouth at onset of headache for migraine May repeat in 2 hours. Max 6 tablets/24 hours.    Migraine with aura and without status migrainosus, not intractable       sucralfate 1 GM tablet    CARAFATE    60 tablet    Take 1 tablet (1 g) by mouth 4 times daily as needed    Gastroesophageal reflux disease without esophagitis       varenicline 0.5 MG X 11 & 1 MG X 42 tablet    CHANTIX STARTING MONTH ILAN    53 tablet    Take 0.5 mg tab daily for 3 days, then 0.5 mg tab twice daily for 4 days, then 1 mg twice daily.    Tobacco use disorder       * Notice:  This list has 4 medication(s) that are the same as other medications prescribed for you. Read the directions carefully, and ask your doctor or other care provider to review them with you.

## 2018-10-09 ENCOUNTER — TELEPHONE (OUTPATIENT)
Dept: FAMILY MEDICINE | Facility: OTHER | Age: 28
End: 2018-10-09

## 2018-10-09 NOTE — TELEPHONE ENCOUNTER
Prior Authorization Retail Medication Request    Medication/Dose: ondansetron (ZOFRAN) 4 MG tablet  ICD code (if different than what is on RX):    Previously Tried and Failed:    Rationale:      Insurance Name:    Insurance ID:        Pharmacy Information (if different than what is on RX)  Name:  Natyivette Tigre   Phone:  [phone]

## 2018-10-09 NOTE — TELEPHONE ENCOUNTER
PA Initiation    Medication: ondansetron (ZOFRAN) 4 MG tablet  Insurance Company: [employer] - Phone [phone] Fax [phone]  Pharmacy Filling the Rx: [facility] [address] [place] [address] AT [address]  Filling Pharmacy Phone: [phone]  Filling Pharmacy Fax:    Start Date: 10/9/2018    THIS HAS BEEN SUBMITTED BY THE PRIOR-AUTHORIZATION TEAM. ANY QUESTIONS PLEASE CALL [phone]. THANK YOU

## 2018-10-10 ENCOUNTER — TRANSFERRED RECORDS (OUTPATIENT)
Dept: HEALTH INFORMATION MANAGEMENT | Facility: CLINIC | Age: 28
End: 2018-10-10

## 2018-10-11 NOTE — TELEPHONE ENCOUNTER
Prior Authorization Approval    Authorization Effective Date: 7/11/2018  Authorization Expiration Date: 10/9/2019  Medication: ondansetron (ZOFRAN) 4 MG tablet approved   Approved Dose/Quantity:   Reference #:     Insurance Company: [employer] - Phone [phone] Fax [phone]  Expected CoPay:       CoPay Card Available:      Foundation Assistance Needed:    Which Pharmacy is filling the prescription (Not needed for infusion/clinic administered): [facility] [address] [place] [address] AT [facility] [address]  Pharmacy Notified: Yes  Patient Notified: Yes

## 2018-10-21 ENCOUNTER — MYC MEDICAL ADVICE (OUTPATIENT)
Dept: FAMILY MEDICINE | Facility: OTHER | Age: 28
End: 2018-10-21

## 2018-10-22 ENCOUNTER — TELEPHONE (OUTPATIENT)
Dept: FAMILY MEDICINE | Facility: OTHER | Age: 28
End: 2018-10-22

## 2018-10-22 DIAGNOSIS — G43.109 MIGRAINE WITH AURA AND WITHOUT STATUS MIGRAINOSUS, NOT INTRACTABLE: Primary | ICD-10-CM

## 2018-10-22 RX ORDER — RIZATRIPTAN BENZOATE 5 MG/1
5-10 TABLET ORAL
Qty: 18 TABLET | Refills: 3 | Status: SHIPPED | OUTPATIENT
Start: 2018-10-22 | End: 2019-05-13

## 2018-10-22 NOTE — TELEPHONE ENCOUNTER
See other encounter.   Please let patient know ODT (dissolvable) not covered, so I have sent for regular pills form.     Zach Rosario PA-C  [facility]

## 2018-10-22 NOTE — TELEPHONE ENCOUNTER
"Per other encounter: \"Patients insurance doesn't cover the rizatriptan ODT 5 mg tab, but will cover the Rizatriptant ABMG if okay to send that one  Pharmacy pended. \"  "

## 2018-10-22 NOTE — TELEPHONE ENCOUNTER
Patients insurance doesn't cover the rizatriptan ODT 5 mg tab, but will cover the Rizatriptant ABMG if okay to send that one  Pharmacy pended.

## 2018-10-22 NOTE — TELEPHONE ENCOUNTER
Zofran should be at the pharmacy.   Patient would like to let you know that the Maxalt is $600.     Ines Roy, RN, BSN

## 2018-10-26 ENCOUNTER — TRANSFERRED RECORDS (OUTPATIENT)
Dept: HEALTH INFORMATION MANAGEMENT | Facility: CLINIC | Age: 28
End: 2018-10-26

## 2018-10-29 NOTE — PROGRESS NOTES
SUBJECTIVE:   Katy Islas is a 28 year old female who presents to clinic today for the following health issues:      History of Present Illness     Diet:  Regular (no restrictions)  Frequency of exercise:  2-3 days/week  Duration of exercise:  30-45 minutes  Taking medications regularly:  Yes  Medication side effects:  Not applicable  Additional concerns today:  No     ED/UC Followup:    Facility:  [facility]   Date of visit: 10/10/18  Reason for visit: Seizure/headache   Current Status: Improved     - Once started taking phosphorus feels better   - [facility] for Neurology in the past, thinks her provider retired   - No further headaches   - Did not follow up with neurology   - Patient is not convinced this was a seizure      Per patient felt fine, was told by friends passed out and was not responsive for 30 min     - Had her spine fusion  - Sees surgeon on Monday for recheck   - Would like to increase valium for pain control and would like to get a handicapped sticker.          Migraine Follow-Up    Headaches symptoms:  Improved after seizures they got a lot better and she started phosphorus     Frequency: 0     Duration of headaches: 0    Able to do normal daily activities/work with migraines: Yes    Rescue/Relief medication:phosphorus              Effectiveness: total relief    Preventative medication: None    Neurologic complications: No new stroke-like symptoms, loss of vision or speech, numbness or weakness     In the past 4 weeks, how often have you gone to Urgent Care or the emergency room because of your headaches?  0      ED Note   Course:  12:09 AM  Patient is a 28-year-old female with history of pseudoseizure and chronic pain and multiple visits to the emergency room for pain and other issues. Patient does not appear to be in distress. Vital signs have been reviewed and noted to have a low-grade temperature of 99.5 and a pulse of 118 which is baseline for patient.  Neurological exam is unremarkable. Previous records have been reviewed and patient had a negative EEG on multiple occasion in 2015 and is currently not taking any seizure medications. Patient requested Dilaudid for her headache and we eventually agreed not to use any controlled substances. Patient declined any Naprosyn or Toradol but agreed to take a dose of Benadryl. No red flag signs or symptoms noted with history of headache or exam and this does not appear to be intracranial bleed and neurological exam is intact. Plan at this time is to do basic blood work for seizure and also get urine pregnancy test and urine drug screen. Patient was interviewed with the help of .  1:13 AM  All test results reviewed. White cell count is normal at 4.6. Hemoglobin stable at 12.3. No left shift noted. Complains of panel is normal except for chloride of 111. Magnesium is normal. Phosphorus is slightly low at 2.0 with normal being 2.5. Will start on phosphorus supplement. Urine pregnancy test is negative. Urine drug screen positive for marijuana, opiate and benzo's. Condition is stable at the time of discharge.    MDM  Number of Diagnoses or Management Options    Amount and/or Complexity of Data Reviewed  Decide to obtain previous medical records or to obtain history from someone other than the patient: yes  Review and summarize past medical records: yes    Risk of Complications, Morbidity, and/or Mortality  Presenting problems: high  Diagnostic procedures: moderate  Management options: moderate    Diagnosis:  ICD-10-CM   1. Seizure (HCC) R56.9   2. Headache syndrome G44.89   3. Hypophosphatemia E83.39 k phos di and mono-sod phos mono (AKA: K PHOS NEUTRAL) 250 mg oral Tablet     Plan:  Discharge Instructions   Continue with current medication, follow-up with PCP in clinic tomorrow, follow-up with neurologist in 3-5 days, return to emergency room as needed              Problem list and histories reviewed &  adjusted, as indicated.  Additional history: as documented    ROS:  Constitutional, HEENT, cardiovascular, pulmonary, gi and gu systems are negative, except as otherwise noted.    OBJECTIVE:   /76  Pulse 106  Temp 97  F (36.1  C) (Temporal)  Resp 16  Wt 176 lb 6.4 oz (80 kg)  LMP 10/17/2018 (Approximate)  SpO2 98%  Breastfeeding? No  BMI 32.88 kg/m2  Body mass index is 32.88 kg/(m^2).  GENERAL APPEARANCE: healthy, alert and no distress  EYES: Eyes grossly normal to inspection, PERRLA, conjunctivae and sclerae without injection or discharge, EOM intact   RESP: Lungs clear to auscultation - no rales, rhonchi or wheezes   CV: Regular rates and rhythm, normal S1 S2, no S3 or S4, no murmur, click or rub, no peripheral edema and peripheral pulses strong and symmetric bilaterally   MS: No musculoskeletal defects are noted and gait is age appropriate without ataxia   SKIN: No suspicious lesions or rashes, hydration status appears adeuqate with normal skin turgor   BACK: Incision on left upper buttock - 3 steri strips in place, slight edema, no signs of infection   PSYCH: Alert and oriented x3; speech- coherent , normal rate and volume; able to articulate logical thoughts, able to abstract reason, no tangential thoughts, no hallucinations or delusions, mentation appears normal, Mood is euthymic. Affect is appropriate for this mood state and bright. Thought content is free of suicidal ideation, hallucinations, and delusions. Dress is adequate and upkept. Eye contact is good during conversation.       Diagnostic Test Results:  Phosphorus - pending       ASSESSMENT/PLAN:       ICD-10-CM    1. Hypophosphatemia E83.39 Phosphorus     NEUROLOGY ADULT REFERRAL   2. Migraine with aura and without status migrainosus, not intractable G43.109 NEUROLOGY ADULT REFERRAL     ondansetron (ZOFRAN ODT) 4 MG ODT tab   3. Crohn's disease of colon with other complication (H) K50.118    4. S/P lumbar fusion Z98.1    5. Melanocytic  nevus, unspecified location D22.9    6. Bipolar disorder, current episode mixed, severe, without psychotic features (H) F31.63      1. Recommend patient discuss pain management and disability parking with her surgeon on Monday      I am only currently prescribing her Valium, discussed top dose 10 mg BID (she is already on) reviewed risks of using this with narcotics including sudden death     2. Patient lost number for dermatology, this was given to her     3. Reviewed ED note with patient   - Discussed phosphorus was intended for 15 days, so we should check a level today to see if needs to continue   - Since migraines have been so bad, history of seizures, and this possible incident of pseudoseizure, recommend she still establish with a new neurologist, patient in agreement   - Continue phosphorus for now, await lab   - Refilled zofran     4. General update      Saw [facility] - things going better with Crohn's no issues lately          Was put on Protonix BID and things have been very good       Counseling and psychiatry with [facility] in [place]           Increase in her Buspar has been helping, feeling much better       The patient indicates understanding of these issues and agrees with the plan.    Follow up: 6 months or PRN and with specialists as scheduled     Due to language barrier, an  was present during the history-taking and subsequent discussion (and for part of the physical exam) with this patient.    The patient was seen with student RENATO Rowan.       Aura Rosario PA-C  [facility]

## 2018-11-01 ENCOUNTER — MYC MEDICAL ADVICE (OUTPATIENT)
Dept: FAMILY MEDICINE | Facility: OTHER | Age: 28
End: 2018-11-01

## 2018-11-01 ENCOUNTER — TELEPHONE (OUTPATIENT)
Dept: FAMILY MEDICINE | Facility: OTHER | Age: 28
End: 2018-11-01

## 2018-11-01 ENCOUNTER — OFFICE VISIT (OUTPATIENT)
Dept: FAMILY MEDICINE | Facility: OTHER | Age: 28
End: 2018-11-01
Payer: MEDICARE

## 2018-11-01 VITALS
WEIGHT: 176.4 LBS | RESPIRATION RATE: 16 BRPM | HEART RATE: 106 BPM | OXYGEN SATURATION: 98 % | DIASTOLIC BLOOD PRESSURE: 76 MMHG | BODY MASS INDEX: 32.88 KG/M2 | SYSTOLIC BLOOD PRESSURE: 126 MMHG | TEMPERATURE: 97 F

## 2018-11-01 DIAGNOSIS — E83.39 HYPOPHOSPHATEMIA: Primary | ICD-10-CM

## 2018-11-01 DIAGNOSIS — G43.109 MIGRAINE WITH AURA AND WITHOUT STATUS MIGRAINOSUS, NOT INTRACTABLE: ICD-10-CM

## 2018-11-01 DIAGNOSIS — F31.63 BIPOLAR DISORDER, CURRENT EPISODE MIXED, SEVERE, WITHOUT PSYCHOTIC FEATURES (H): ICD-10-CM

## 2018-11-01 DIAGNOSIS — Z98.1 S/P LUMBAR FUSION: ICD-10-CM

## 2018-11-01 DIAGNOSIS — D22.9 MELANOCYTIC NEVUS, UNSPECIFIED LOCATION: ICD-10-CM

## 2018-11-01 DIAGNOSIS — K50.118 CROHN'S DISEASE OF COLON WITH OTHER COMPLICATION (H): ICD-10-CM

## 2018-11-01 LAB — PHOSPHATE SERPL-MCNC: 3.4 MG/DL (ref 2.5–4.5)

## 2018-11-01 PROCEDURE — 36415 COLL VENOUS BLD VENIPUNCTURE: CPT | Performed by: PHYSICIAN ASSISTANT

## 2018-11-01 PROCEDURE — 84100 ASSAY OF PHOSPHORUS: CPT | Performed by: PHYSICIAN ASSISTANT

## 2018-11-01 PROCEDURE — 99214 OFFICE O/P EST MOD 30 MIN: CPT | Performed by: PHYSICIAN ASSISTANT

## 2018-11-01 RX ORDER — ONDANSETRON 4 MG/1
4 TABLET, ORALLY DISINTEGRATING ORAL EVERY 6 HOURS PRN
Qty: 30 TABLET | Refills: 5 | Status: SHIPPED | OUTPATIENT
Start: 2018-11-01 | End: 2019-02-28

## 2018-11-01 RX ORDER — SOD PHOS DI, MONO/K PHOS MONO 250 MG
TABLET ORAL
Refills: 0 | COMMUNITY
Start: 2018-10-26 | End: 2019-01-21

## 2018-11-01 RX ORDER — BUSPIRONE HYDROCHLORIDE 15 MG/1
TABLET ORAL
Refills: 5 | COMMUNITY
Start: 2018-10-08 | End: 2019-06-25

## 2018-11-01 RX ORDER — OXYCODONE HYDROCHLORIDE 10 MG/1
TABLET ORAL
COMMUNITY
End: 2018-11-09

## 2018-11-01 RX ORDER — CLONAZEPAM 0.5 MG/1
TABLET ORAL
Refills: 5 | COMMUNITY
Start: 2018-10-21 | End: 2019-09-18

## 2018-11-01 ASSESSMENT — ANXIETY QUESTIONNAIRES
GAD7 TOTAL SCORE: 3
GAD7 TOTAL SCORE: 3
6. BECOMING EASILY ANNOYED OR IRRITABLE: NOT AT ALL
7. FEELING AFRAID AS IF SOMETHING AWFUL MIGHT HAPPEN: NOT AT ALL
GAD7 TOTAL SCORE: 3
4. TROUBLE RELAXING: NOT AT ALL
1. FEELING NERVOUS, ANXIOUS, OR ON EDGE: SEVERAL DAYS
2. NOT BEING ABLE TO STOP OR CONTROL WORRYING: SEVERAL DAYS
5. BEING SO RESTLESS THAT IT IS HARD TO SIT STILL: NOT AT ALL
3. WORRYING TOO MUCH ABOUT DIFFERENT THINGS: SEVERAL DAYS
7. FEELING AFRAID AS IF SOMETHING AWFUL MIGHT HAPPEN: NOT AT ALL

## 2018-11-01 ASSESSMENT — PATIENT HEALTH QUESTIONNAIRE - PHQ9
10. IF YOU CHECKED OFF ANY PROBLEMS, HOW DIFFICULT HAVE THESE PROBLEMS MADE IT FOR YOU TO DO YOUR WORK, TAKE CARE OF THINGS AT HOME, OR GET ALONG WITH OTHER PEOPLE: SOMEWHAT DIFFICULT
SUM OF ALL RESPONSES TO PHQ QUESTIONS 1-9: 2
SUM OF ALL RESPONSES TO PHQ QUESTIONS 1-9: 2

## 2018-11-01 ASSESSMENT — PAIN SCALES - GENERAL: PAINLEVEL: MODERATE PAIN (5)

## 2018-11-01 NOTE — TELEPHONE ENCOUNTER
Sent [employer] message clarifying about phosphorus dosing. Await response and route back to provider.     Zach Rosario PA-C  [facility]

## 2018-11-01 NOTE — PATIENT INSTRUCTIONS
1. Call for appointment with Neurology [phone]    2. Call for appointment with dermatology   [facility] [phone]   [email or web address]

## 2018-11-01 NOTE — MR AVS SNAPSHOT
After Visit Summary   11/1/2018    Katy Islas    MRN: [id number]           Patient Information     Date Of Birth          1990        Visit Information        Provider Department      11/1/2018 10:45 AM Aura Rosario PA-C; [facility]        Today's Diagnoses     Hypophosphatemia    -  1    Migraine with aura and without status migrainosus, not intractable        Need for prophylactic vaccination and inoculation against influenza          Care Instructions      1. Call for appointment with Neurology [phone]    2. Call for appointment with dermatology   [facility] [place] [phone]   [email or web address]              Follow-ups after your visit        Additional Services     NEUROLOGY ADULT REFERRAL       Your provider has referred you for the following:   EEG at  PREFERRED PROVIDERS:  [facility]: [facility] [phone]   [email or web address]    Please be aware that coverage of these services is subject to the terms and limitations of your health insurance plan.  Call member services at your health plan with any benefit or coverage questions.      Please bring the following with you to your appointment:    (1) Any X-Rays, CTs or MRIs which have been performed.  Contact the facility where they were done to arrange for  prior to your scheduled appointment.    (2) List of current medications  (3) This referral request   (4) Any documents/labs given to you for this referral                  Your next 10 appointments already scheduled     Nov 09, 2018 10:30 AM CST   Office Visit with Aura Rosario PA-C   [facility]    [address]  [facility] [id number] [phone]           Bring a current list of meds and any records pertaining to this visit.  For Physicals, please bring immunization records and any forms needing to be filled out. Please arrive 10 minutes early to complete paperwork.              Who to contact     If you have questions or need follow up information about today's clinic visit or your schedule please contact [facility] [place] directly at [phone].  Normal or non-critical lab and imaging results will be communicated to you by MyChart, letter or phone within 4 business days after the clinic has received the results. If you do not hear from us within 7 days, please contact the clinic through [employer] or phone. If you have a critical or abnormal lab result, we will notify you by phone as soon as possible.  Submit refill requests through [employer] or call your pharmacy and they will forward the refill request to us. Please allow 3 business days for your refill to be completed.          Additional Information About Your Visit        MyChart Information     [employer] gives you secure access to your electronic health record. If you see a primary care provider, you can also send messages to your care team and make appointments. If you have questions, please call your primary care clinic.  If you do not have a primary care provider, please call [phone] and they will assist you.        Care EveryWhere ID     This is your Care EveryWhere ID. This could be used by other organizations to access your [place] medical records  [id number]        Your Vitals Were     Pulse Temperature Respirations Last Period Pulse Oximetry Breastfeeding?    106 97  F (36.1  C) (Temporal) 16 10/17/2018 (Approximate) 98% No    BMI (Body Mass Index)                   32.88 kg/m2            Blood Pressure from Last 3 Encounters:   11/01/18 126/76   10/08/18 118/72   09/05/18 100/64    Weight from Last 3 Encounters:   11/01/18 176 lb 6.4 oz (80 kg)   10/08/18 168 lb 12.8 oz (76.6 kg)   09/05/18 167 lb (75.8 kg)              We Performed the Following      NEUROLOGY ADULT REFERRAL     Phosphorus          Today's Medication Changes          These changes are accurate as of 11/1/18 11:26 AM.  If you have any questions, ask your nurse or doctor.               Start taking these medicines.        Dose/Directions    ondansetron 4 MG ODT tab   Commonly known as:  ZOFRAN ODT   Used for:  Migraine with aura and without status migrainosus, not intractable   Started by:  Aura Rosario PA-C        Dose:  4 mg   Take 1 tablet (4 mg) by mouth every 6 hours as needed for nausea   Quantity:  30 tablet   Refills:  5         These medicines have changed or have updated prescriptions.        Dose/Directions    pantoprazole 40 MG EC tablet   Commonly known as:  PROTONIX   This may have changed:    - when to take this  - additional instructions   Used for:  Gastroesophageal reflux disease without esophagitis        Dose:  40 mg   Take 1 tablet (40 mg) by mouth daily Take 30-60 minutes before a meal.   Quantity:  30 tablet   Refills:  3            Where to get your medicines      These medications were sent to [facility] [address] [place] [address] [address]  [address] [facility] [address]     Phone:  [phone]     ondansetron 4 MG ODT tab               Information about OPIOIDS     PRESCRIPTION OPIOIDS: WHAT YOU NEED TO KNOW   We gave you an opioid (narcotic) pain medicine. It is important to manage your pain, but opioids are not always the best choice. You should first try all the other options your care team gave you. Take this medicine for as short a time (and as few doses) as possible.    Some activities can increase your pain, such as bandage changes or therapy sessions. It may help to take your pain medicine 30 to 60 minutes before these activities. Reduce your stress by getting enough sleep, working on hobbies you enjoy and practicing relaxation or meditation. Talk to your care team about ways  to manage your pain beyond prescription opioids.    These medicines have risks:    DO NOT drive when on new or higher doses of pain medicine. These medicines can affect your alertness and reaction times, and you could be arrested for driving under the influence (DUI). If you need to use opioids long-term, talk to your care team about driving.    DO NOT operate heavy machinery    DO NOT do any other dangerous activities while taking these medicines.    DO NOT drink any alcohol while taking these medicines.     If the opioid prescribed includes acetaminophen, DO NOT take with any other medicines that contain acetaminophen. Read all labels carefully. Look for the word  acetaminophen  or  Tylenol.  Ask your pharmacist if you have questions or are unsure.    You can get addicted to pain medicines, especially if you have a history of addiction (chemical, alcohol or substance dependence). Talk to your care team about ways to reduce this risk.    All opioids tend to cause constipation. Drink plenty of water and eat foods that have a lot of fiber, such as fruits, vegetables, prune juice, apple juice and high-fiber cereal. Take a laxative (Miralax, milk of magnesia, Colace, Senna) if you don t move your bowels at least every other day. Other side effects include upset stomach, sleepiness, dizziness, throwing up, tolerance (needing more of the medicine to have the same effect), physical dependence and slowed breathing.    Store your pills in a secure place, locked if possible. We will not replace any lost or stolen medicine. If you don t finish your medicine, please throw away (dispose) as directed by your pharmacist. The [place] Pollution Control Agency has more information about safe disposal: [email or web address]         Primary Care Provider Office Phone # Fax #    Aura Rosario PA-C [phone]       [address] [place] [address]  [phone]        Equal Access to Services     [facility] Center[facility]ETER : Hadii pilo bishop nyla Soyvette, waaxda luqadaha, qaybta kaalmada stephanieopalduarte, waxfernando yoselin tomlinluis mlakshmi turner. So [facility] [phone].    ATENCIÓN: Si habla español, tiene a kerr disposición servicios gratuitos de asistencia lingüística. Ruyame al [phone].    We comply with applicable federal civil rights laws and [place] laws. We do not discriminate on the basis of race, color, national origin, age, disability, sex, sexual orientation, or gender identity.            Thank you!     Thank you for choosing [facility]  for your care. Our goal is always to provide you with excellent care. Hearing back from our patients is one way we can continue to improve our services. Please take a few minutes to complete the written survey that you may receive in the mail after your visit with us. Thank you!             Your Updated Medication List - Protect others around you: Learn how to safely use, store and throw away your medicines at www.disposemymeds.org.          This list is accurate as of 11/1/18 11:26 AM.  Always use your most recent med list.                   Brand Name Dispense Instructions for use Diagnosis    * albuterol 108 (90 Base) MCG/ACT inhaler    PROAIR HFA/PROVENTIL HFA/VENTOLIN HFA    3 Inhaler    Inhale 2 puffs into the lungs every 6 hours as needed for shortness of breath / dyspnea or wheezing    Mild persistent asthma without complication       * albuterol (2.5 MG/3ML) 0.083% neb solution     50 vial    Take 1 vial (2.5 mg) by nebulization every 6 hours as needed for shortness of breath / dyspnea or wheezing    Mild persistent asthma without complication       ARIPiprazole 15 MG tablet    ABILIFY    90 tablet    Take 1 tablet (15 mg) by mouth At Bedtime    Adjustment disorder with mixed anxiety and depressed mood, Anxiety       * busPIRone 10 MG tablet    BUSPAR          * busPIRone 15 MG tablet    BUSPAR     TK 1 T PO BID         cetirizine 10 MG tablet    zyrTEC    90 tablet    Take 1 tablet (10 mg) by mouth daily    Mild persistent asthma without complication       clonazePAM 0.5 MG tablet    klonoPIN     TK 1/2 TO 1 T D PRA        cyclobenzaprine 10 MG tablet    FLEXERIL    60 tablet    Take 1 tablet (10 mg) by mouth 2 times daily as needed for muscle spasms or other (back pain)    Chronic bilateral low back pain with left-sided sciatica       diazepam 10 MG tablet    VALIUM    60 tablet    Take 1 tablet (10 mg) by mouth every 12 hours as needed for anxiety or sleep (MUSCLE SPASM) (one month supply)    Chronic pain syndrome, Chronic bilateral low back pain with left-sided sciatica, Lumbar disc disease with radiculopathy, S/P lumbar fusion       dicyclomine 20 MG tablet    BENTYL    60 tablet    Take 1 tablet (20 mg) by mouth 4 times daily as needed (ABDOMINAL CRAMPING) AS NEEDED FOR CRAMPING    Hx of Crohn's disease       DULoxetine 60 MG EC capsule    CYMBALTA    90 capsule    Take 1 capsule (60 mg) by mouth daily    Adjustment disorder with mixed anxiety and depressed mood       EPINEPHrine 0.3 MG/0.3ML injection 2-pack    EPIPEN/ADRENACLICK/or ANY BX GENERIC EQUIV    0.6 mL    Inject 0.3 mLs (0.3 mg) into the muscle once as needed for anaphylaxis    Bee sting allergy       gabapentin 300 MG capsule    NEURONTIN    120 capsule    Take 1-3 capsules (300-900 mg) by mouth 3 times daily    Chronic bilateral low back pain with left-sided sciatica       HYDROcodone-acetaminophen 5-325 MG per tablet    NORCO     Take 1 tablet every 6 hours by oral route as needed for 15 days.    Nausea       LORazepam 0.5 MG tablet    ATIVAN     Take 0.5 mg by mouth 2 times daily        medical cannabis inhalation (Patient's own supply.  Not a prescription)      Inhale into the lungs 3 times daily as needed Reported on 3/28/2017        medroxyPROGESTERone 150 MG/ML injection    DEPO-PROVERA    3 mL    Inject 1 mL (150 mg) into the muscle every 3 months     Encounter for initial prescription of injectable contraceptive       naproxen 500 MG tablet    NAPROSYN    60 tablet    Take 1 tablet (500 mg) by mouth 2 times daily (with meals)    Chronic bilateral low back pain with left-sided sciatica       NONFORMULARY      Apply 30 mLs topically 4 times daily        nystatin cream    MYCOSTATIN     nystatin 100,000 unit/gram topical cream    Nausea       ondansetron 4 MG ODT tab    ZOFRAN ODT    30 tablet    Take 1 tablet (4 mg) by mouth every 6 hours as needed for nausea    Migraine with aura and without status migrainosus, not intractable       ondansetron 4 MG tablet    ZOFRAN    30 tablet    Take 1-2 tablets (4-8 mg) by mouth every 6 hours as needed for nausea    Migraine with aura and without status migrainosus, not intractable       oxyCODONE IR 10 MG tablet    ROXICODONE     Take 1 tablet every 4 hours by oral route as directed for 10 days.        pantoprazole 40 MG EC tablet    PROTONIX    30 tablet    Take 1 tablet (40 mg) by mouth daily Take 30-60 minutes before a meal.    Gastroesophageal reflux disease without esophagitis       prazosin 1 MG capsule    MINIPRESS     Take 1 mg by mouth At Bedtime        promethazine 6.25 MG/5ML syrup    PHENERGAN    560 mL    Take 10 mLs (12.5 mg) by mouth 4 times daily as needed for nausea    Nausea       * rizatriptan 5 MG ODT tab    MAXALT-MLT    18 tablet    Take 1-2 tablets (5-10 mg) by mouth at onset of headache for migraine May repeat in 2 hours. Max 6 tablets/24 hours.    Migraine with aura and without status migrainosus, not intractable       * rizatriptan 5 MG tablet    MAXALT    18 tablet    Take 1-2 tablets (5-10 mg) by mouth at onset of headache for migraine May repeat in 2 hours. Max 6 tablets/24 hours.    Migraine with aura and without status migrainosus, not intractable       sucralfate 1 GM tablet    CARAFATE    60 tablet    Take 1 tablet (1 g) by mouth 4 times daily as needed    Gastroesophageal reflux disease  without esophagitis       varenicline 0.5 MG X 11 & 1 MG X 42 tablet    CHANTIX STARTING MONTH ILAN    53 tablet    Take 0.5 mg tab daily for 3 days, then 0.5 mg tab twice daily for 4 days, then 1 mg twice daily.    Tobacco use disorder       VIRT-PHOS 250 NEUTRAL [id number] MG Tabs      TK 1 T PO BID FOR 15 DAYS        * Notice:  This list has 6 medication(s) that are the same as other medications prescribed for you. Read the directions carefully, and ask your doctor or other care provider to review them with you.

## 2018-11-02 ASSESSMENT — PATIENT HEALTH QUESTIONNAIRE - PHQ9: SUM OF ALL RESPONSES TO PHQ QUESTIONS 1-9: 2

## 2018-11-02 ASSESSMENT — ANXIETY QUESTIONNAIRES: GAD7 TOTAL SCORE: 3

## 2018-11-02 NOTE — TELEPHONE ENCOUNTER
"Pasted from other encounter: \"Sent [employer]ola message clarifying about phosphorus dosing. Await response and route back to provider.      Zach Tomlinson-LOVE Moss  [facility] \"  "

## 2018-11-05 ENCOUNTER — TRANSFERRED RECORDS (OUTPATIENT)
Dept: HEALTH INFORMATION MANAGEMENT | Facility: CLINIC | Age: 28
End: 2018-11-05

## 2018-11-05 NOTE — PROGRESS NOTES
SUBJECTIVE:   Katy Islas is a 28 year old female who presents to clinic today for the following health issues:      HPI  Patient here to recheck surgical incisions from SI joint fusion on 10/26/18     - Was told surgeon won't prescribe and should get from her PCP      Went on Monday   - 10 mg every 4 hours, 6/day   - Not really helping   - Also increased 900 mg TID Gabapentin   - Called [facility], they don't have referral yet   - No further appointment, thinks supposed to be in 1 month   - Doesn't know what changed, but this week was bad, can barely walk   - Good days and bad days   - Neighbor caring for her      - Asking for Fentanyl patches or MS contin or Dilaudid       Problem list and histories reviewed & adjusted, as indicated.  Additional history: as documented    Labs reviewed in EPIC    ROS:  Constitutional, HEENT, cardiovascular, pulmonary, gi and gu systems are negative, except as otherwise noted.    OBJECTIVE:   /70  Pulse 126  Temp 97.4  F (36.3  C) (Temporal)  Resp 16  LMP 10/17/2018 (Approximate)  SpO2 96%  There is no height or weight on file to calculate BMI.  GENERAL APPEARANCE: healthy, alert and no distress  EYES: Eyes grossly normal to inspection, PERRLA, conjunctivae and sclerae without injection or discharge, EOM intact   RESP: Lungs clear to auscultation - no rales, rhonchi or wheezes    CV: Regular rates and rhythm, normal S1 S2, no S3 or S4, no murmur, click or rub, no peripheral edema and peripheral pulses strong and symmetric bilaterally   MS: No musculoskeletal defects are noted and sitting in wheelchair   SKIN:    Left upper buttocks: Surgical incision well healed with no signs of infection, 3 steri strips in place, bruising now yellow-green and almost resolved    No suspicious lesions or rashes, hydration status appears adeuqate with normal skin turgor   PSYCH: Alert and oriented x3; speech- coherent , normal rate and volume; able to articulate logical thoughts, able to  abstract reason, no tangential thoughts, no hallucinations or delusions, mentation appears normal, Mood is euthymic. Affect is appropriate for this mood state and bright. Thought content is free of suicidal ideation, hallucinations, and delusions. Dress is adequate and upkept. Eye contact is good during conversation.       Diagnostic Test Results:  none     ASSESSMENT/PLAN:       ICD-10-CM    1. S/P lumbar fusion Z98.1 PAIN MANAGEMENT REFERRAL     methylPREDNISolone (MEDROL DOSEPAK) 4 MG tablet     oxyCODONE IR (ROXICODONE) 10 MG tablet     DISCONTINUED: oxyCODONE IR (ROXICODONE) 10 MG tablet   2. Hypophosphatemia E83.39      - Patient s/p SI joint fusion 10/26/18, also recently coccyx removal and prior lumbar fusion    - Letter patient got from surgeon reviewed (sent to scanning)     Dated 11/3/18 stating they would prescribe for 90 days post-op, but saying they would like her to get pain medications from PCP or pain clinic going forward     - Spoke to Dr. Millan's nurse        They were prescribing oxycodone 10 mg 1 every 4 hours, 6/day       Gave her 10 day supply dated 11/4/18 and 11/13/18       She was requesting more than this, so they cut her off       Needs 1 month recheck, at 1 month would start taper       Was ok'd to start physical therapy right away, they sent order to [facility]      No NSAIDs for 1 month       Also increased her gabapentin to 900 mg TID     -  reviewed      11/4/18 - Oxycodone 10 mg #60       10/26/18 - oxycodone 10 mg #60       And so on, all from Dr. Millan or his team     - Discussed with patient that I am also not willing to prescribe a lot of pain medications   - She continued to argue stating she is so tolerant to medications and doesn't understand why last time Dr. Millan gave her Fentanyl patches   - Made agreement to increase Oxycodone 10 mg from 6/day to 8/day        Total dose 60-80 mg/day, MME          Reviewed risks of use including sedation, addiction, and constipation         Discussed risks of sudden death when MME approaches 120        Discussed I am not willing to go any higher   - Patient also requesting Medrol dose pack as this helped a lot in the past, will ok this since 2 weeks out from surgery and looks healed with no signs of infection, however should clear this with her surgeon   - Should start tomorrow in AM if cleared, reviewed use and side effects   - Will follow every 2 weeks, expect to start tapering off in 1 month or less      Discussed I will NOT be prescribing this long term   - Made new referral for [facility] for both comprehensive management AND physical therapy, discussed they can take prescribing over   - Patient should also continue Flexeril and Gabapentin as prescribed     - Patient had low phosphorus in ED, finished replacement, plan for lab recheck in 2 weeks to see if needs to continue     The patient indicates understanding of these issues and agrees with the plan.    Follow up: 2 weeks     Due to language barrier, an  was present during the history-taking and subsequent discussion (and for part of the physical exam) with this patient.        Aura Rosario PA-C  [facility]

## 2018-11-08 NOTE — TELEPHONE ENCOUNTER
Informed patient of results. Patient had no questions, she will be coming tomorrow and will schedule for her labs then.    Jenni Luo MA

## 2018-11-08 NOTE — TELEPHONE ENCOUNTER
Have patient complete phosphorus medications she has and then stop. Come in for LAB only recheck in 2 weeks     Also see if patient still needs her 10:30 am appointment tomorrow since I checked her incision at our last visit.       Zach Rosario PA-C  [facility]

## 2018-11-09 ENCOUNTER — OFFICE VISIT (OUTPATIENT)
Dept: FAMILY MEDICINE | Facility: OTHER | Age: 28
End: 2018-11-09
Payer: MEDICARE

## 2018-11-09 ENCOUNTER — TELEPHONE (OUTPATIENT)
Dept: FAMILY MEDICINE | Facility: OTHER | Age: 28
End: 2018-11-09

## 2018-11-09 VITALS
RESPIRATION RATE: 16 BRPM | DIASTOLIC BLOOD PRESSURE: 70 MMHG | HEART RATE: 126 BPM | OXYGEN SATURATION: 96 % | SYSTOLIC BLOOD PRESSURE: 104 MMHG | TEMPERATURE: 97.4 F

## 2018-11-09 DIAGNOSIS — E83.39 HYPOPHOSPHATEMIA: ICD-10-CM

## 2018-11-09 DIAGNOSIS — Z98.1 S/P LUMBAR FUSION: Primary | ICD-10-CM

## 2018-11-09 PROCEDURE — 99214 OFFICE O/P EST MOD 30 MIN: CPT | Performed by: PHYSICIAN ASSISTANT

## 2018-11-09 RX ORDER — METHYLPREDNISOLONE 4 MG
TABLET, DOSE PACK ORAL
Qty: 21 TABLET | Refills: 0 | Status: SHIPPED | OUTPATIENT
Start: 2018-11-09 | End: 2019-01-04

## 2018-11-09 RX ORDER — OXYCODONE HYDROCHLORIDE 10 MG/1
10 TABLET ORAL EVERY 6 HOURS PRN
Qty: 112 TABLET | Refills: 0 | Status: SHIPPED | OUTPATIENT
Start: 2018-11-12 | End: 2018-11-09

## 2018-11-09 RX ORDER — OXYCODONE HYDROCHLORIDE 10 MG/1
10-20 TABLET ORAL EVERY 4 HOURS PRN
Qty: 112 TABLET | Refills: 0 | Status: SHIPPED | OUTPATIENT
Start: 2018-11-12 | End: 2019-01-21

## 2018-11-09 ASSESSMENT — PAIN SCALES - GENERAL: PAINLEVEL: SEVERE PAIN (7)

## 2018-11-09 NOTE — MR AVS SNAPSHOT
After Visit Summary   11/9/2018    Katy Islas    MRN: [id number]           Patient Information     Date Of Birth          1990        Visit Information        Provider Department      11/9/2018 10:30 AM Aura Rosario PA-C; [facility]        Today's Diagnoses     S/P lumbar fusion    -  1    Hypophosphatemia          Care Instructions      - Oxycodone 8/day     - Start Medrol dose pack (steroid) Take these in the morning     - Call [facility] - comprehensive management and physical therapy     - Recheck 2 weeks           Follow-ups after your visit        Additional Services     PAIN MANAGEMENT REFERRAL       Your provider has referred you to: OTHER PROVIDERS: [facility], physical therapy per Dr. Millan from [facility] and pain management              **ANY DIAGNOSTIC TESTS THAT ARE NOT IN EPIC SHOULD BE SENT TO THE PAIN CENTER**    REGARDING OPIOID MEDICATIONS:  The discussion of opioids management, appropriateness of therapy, and dosing will be discussed in patients being seen for evaluation.  The pain management clinics are not long-term prescribing clinics, with transition of prescribing of medications ultimately going back to the referring provider/PCP.  If prescribing is taken over at the pain clinic, it is in actively involved patients whom are appropriate for opioids, urine drug screening is completed, and long-term prescribing plan has been determined.  Therefore, we will not be automatically taking over prescribing at the patient's first visit.  Is this agreeable to you? agrees.     Please be aware that coverage of these services is subject to the terms and limitations of your health insurance plan.  Call member services at your health plan with any benefit or coverage questions.      Please bring the following with you to your appointment:    (1) Any X-Rays, CTs or MRIs which have been performed.  Contact the facility where they  were done to arrange for  prior to your scheduled appointment.    (2) List of current medications   (3) This referral request   (4) Any documents/labs given to you for this referral                  Follow-up notes from your care team     Return in about 2 weeks (around 11/23/2018).      Your next 10 appointments already scheduled     Nov 26, 2018  1:00 PM CST   Office Visit with Aura Rosario PA-C   [facility]    [address] [address] [phone]           Bring a current list of meds and any records pertaining to this visit. For Physicals, please bring immunization records and any forms needing to be filled out. Please arrive 10 minutes early to complete paperwork.            Nov 26, 2018  2:00 PM CST   LAB with [facility]   [facility]    [address] [facility] [address] [phone]           Please do not eat 10-12 hours before your appointment if you are coming in fasting for labs on lipids, cholesterol, or glucose (sugar). This does not apply to pregnant women. Water, hot tea and black coffee (with nothing added) are okay. Do not drink other fluids, diet soda or chew gum.              Who to contact     If you have questions or need follow up information about today's clinic visit or your schedule please contact [facility] directly at [phone].  Normal or non-critical lab and imaging results will be communicated to you by MyChart, letter or phone within 4 business days after the clinic has received the results. If you do not hear from us within 7 days, please contact the clinic through MyChart or phone. If you have a critical or abnormal lab result, we will notify you by phone as soon as possible.  Submit refill requests through [employer] or call your pharmacy and they will forward the refill request to us. Please allow 3 business days for  your refill to be completed.          Additional Information About Your Visit        [employer] Information     [employer] gives you secure access to your electronic health record. If you see a primary care provider, you can also send messages to your care team and make appointments. If you have questions, please call your primary care clinic.  If you do not have a primary care provider, please call [phone] and they will assist you.        Care EveryWhere ID     This is your Care EveryWhere ID. This could be used by other organizations to access your [facility] medical records  [id number]        Your Vitals Were     Pulse Temperature Respirations Last Period Pulse Oximetry       126 97.4  F (36.3  C) (Temporal) 16 10/17/2018 (Approximate) 96%        Blood Pressure from Last 3 Encounters:   11/09/18 104/70   11/01/18 126/76   10/08/18 118/72    Weight from Last 3 Encounters:   11/01/18 176 lb 6.4 oz (80 kg)   10/08/18 168 lb 12.8 oz (76.6 kg)   09/05/18 167 lb (75.8 kg)              We Performed the Following     PAIN MANAGEMENT REFERRAL          Today's Medication Changes          These changes are accurate as of 11/9/18 11:48 AM.  If you have any questions, ask your nurse or doctor.               Start taking these medicines.        Dose/Directions    methylPREDNISolone 4 MG tablet   Commonly known as:  MEDROL DOSEPAK   Used for:  S/P lumbar fusion   Started by:  Aura Rosario PA-C        Follow package instructions   Quantity:  21 tablet   Refills:  0         These medicines have changed or have updated prescriptions.        Dose/Directions    oxyCODONE IR 10 MG tablet   Commonly known as:  ROXICODONE   This may have changed:    - See the new instructions.  - These instructions start on 11/12/2018. If you are unsure what to do until then, ask your doctor or other care provider.   Used for:  S/P lumbar fusion   Changed by:  Aura Rosario PA-C        Dose:  10 mg   Start taking on:   11/12/2018   Take 1 tablet (10 mg) by mouth every 6 hours as needed for severe pain (Max 8/day)   Quantity:  112 tablet   Refills:  0       pantoprazole 40 MG EC tablet   Commonly known as:  PROTONIX   This may have changed:    - when to take this  - additional instructions   Used for:  Gastroesophageal reflux disease without esophagitis        Dose:  40 mg   Take 1 tablet (40 mg) by mouth daily Take 30-60 minutes before a meal.   Quantity:  30 tablet   Refills:  3            Where to get your medicines      These medications were sent to [facility] [address] [place] [address] [address] [facility] [address]  [address] [facility] [address]     Phone:  [phone]     methylPREDNISolone 4 MG tablet         Some of these will need a paper prescription and others can be bought over the counter.  Ask your nurse if you have questions.     Bring a paper prescription for each of these medications     oxyCODONE IR 10 MG tablet               Information about OPIOIDS     PRESCRIPTION OPIOIDS: WHAT YOU NEED TO KNOW   We gave you an opioid (narcotic) pain medicine. It is important to manage your pain, but opioids are not always the best choice. You should first try all the other options your care team gave you. Take this medicine for as short a time (and as few doses) as possible.    Some activities can increase your pain, such as bandage changes or therapy sessions. It may help to take your pain medicine 30 to 60 minutes before these activities. Reduce your stress by getting enough sleep, working on hobbies you enjoy and practicing relaxation or meditation. Talk to your care team about ways to manage your pain beyond prescription opioids.    These medicines have risks:    DO NOT drive when on new or higher doses of pain medicine. These medicines can affect your alertness and reaction times, and you could be arrested for driving under the influence (DUI). If you need to use  opioids long-term, talk to your care team about driving.    DO NOT operate heavy machinery    DO NOT do any other dangerous activities while taking these medicines.    DO NOT drink any alcohol while taking these medicines.     If the opioid prescribed includes acetaminophen, DO NOT take with any other medicines that contain acetaminophen. Read all labels carefully. Look for the word  acetaminophen  or  Tylenol.  Ask your pharmacist if you have questions or are unsure.    You can get addicted to pain medicines, especially if you have a history of addiction (chemical, alcohol or substance dependence). Talk to your care team about ways to reduce this risk.    All opioids tend to cause constipation. Drink plenty of water and eat foods that have a lot of fiber, such as fruits, vegetables, prune juice, apple juice and high-fiber cereal. Take a laxative (Miralax, milk of magnesia, Colace, Senna) if you don t move your bowels at least every other day. Other side effects include upset stomach, sleepiness, dizziness, throwing up, tolerance (needing more of the medicine to have the same effect), physical dependence and slowed breathing.    Store your pills in a secure place, locked if possible. We will not replace any lost or stolen medicine. If you don t finish your medicine, please throw away (dispose) as directed by your pharmacist. The [place] Pollution [employer] has more information about safe disposal: [email or web address]://[email or web address].us/living-green/managing-unwanted-medications         Primary Care Provider Office Phone # Fax #    Aura EMILY Rosario PA-C [phone]       [address] [place] [address]        Equal Access to Services     VIKY VILLANUEVA : Ruth Chen, waaxda luqadaha, qaybta kaalmada susan ragland. So [facility] [phone].    ATENCIÓN: Si habla español, tiene a kerr disposición servicios gratuitos de asistencia  lingüísticaRito Shirley al [phone].    We comply with applicable federal civil rights laws and [place] laws. We do not discriminate on the basis of race, color, national origin, age, disability, sex, sexual orientation, or gender identity.            Thank you!     Thank you for choosing [facility]  for your care. Our goal is always to provide you with excellent care. Hearing back from our patients is one way we can continue to improve our services. Please take a few minutes to complete the written survey that you may receive in the mail after your visit with us. Thank you!             Your Updated Medication List - Protect others around you: Learn how to safely use, store and throw away your medicines at www.disposemymeds.org.          This list is accurate as of 11/9/18 11:48 AM.  Always use your most recent med list.                   Brand Name Dispense Instructions for use Diagnosis    * albuterol 108 (90 Base) MCG/ACT inhaler    PROAIR HFA/PROVENTIL HFA/VENTOLIN HFA    3 Inhaler    Inhale 2 puffs into the lungs every 6 hours as needed for shortness of breath / dyspnea or wheezing    Mild persistent asthma without complication       * albuterol (2.5 MG/3ML) 0.083% neb solution     50 vial    Take 1 vial (2.5 mg) by nebulization every 6 hours as needed for shortness of breath / dyspnea or wheezing    Mild persistent asthma without complication       ARIPiprazole 15 MG tablet    ABILIFY    90 tablet    Take 1 tablet (15 mg) by mouth At Bedtime    Adjustment disorder with mixed anxiety and depressed mood, Anxiety       * busPIRone 10 MG tablet    BUSPAR          * busPIRone 15 MG tablet    BUSPAR     TK 1 T PO BID        cetirizine 10 MG tablet    zyrTEC    90 tablet    Take 1 tablet (10 mg) by mouth daily    Mild persistent asthma without complication       clonazePAM 0.5 MG tablet    klonoPIN     TK 1/2 TO 1 T D PRA        cyclobenzaprine 10 MG tablet    FLEXERIL    60 tablet    Take 1 tablet  (10 mg) by mouth 2 times daily as needed for muscle spasms or other (back pain)    Chronic bilateral low back pain with left-sided sciatica       diazepam 10 MG tablet    VALIUM    60 tablet    Take 1 tablet (10 mg) by mouth every 12 hours as needed for anxiety or sleep (MUSCLE SPASM) (one month supply)    Chronic pain syndrome, Chronic bilateral low back pain with left-sided sciatica, Lumbar disc disease with radiculopathy, S/P lumbar fusion       dicyclomine 20 MG tablet    BENTYL    60 tablet    Take 1 tablet (20 mg) by mouth 4 times daily as needed (ABDOMINAL CRAMPING) AS NEEDED FOR CRAMPING    Hx of Crohn's disease       DULoxetine 60 MG EC capsule    CYMBALTA    90 capsule    Take 1 capsule (60 mg) by mouth daily    Adjustment disorder with mixed anxiety and depressed mood       EPINEPHrine 0.3 MG/0.3ML injection 2-pack    EPIPEN/ADRENACLICK/or ANY BX GENERIC EQUIV    0.6 mL    Inject 0.3 mLs (0.3 mg) into the muscle once as needed for anaphylaxis    Bee sting allergy       gabapentin 300 MG capsule    NEURONTIN    120 capsule    Take 1-3 capsules (300-900 mg) by mouth 3 times daily    Chronic bilateral low back pain with left-sided sciatica       HYDROcodone-acetaminophen 5-325 MG per tablet    NORCO     Take 1 tablet every 6 hours by oral route as needed for 15 days.    Nausea       LORazepam 0.5 MG tablet    ATIVAN     Take 0.5 mg by mouth 2 times daily        medical cannabis inhalation (Patient's own supply.  Not a prescription)      Inhale into the lungs 3 times daily as needed Reported on 3/28/2017        medroxyPROGESTERone 150 MG/ML injection    DEPO-PROVERA    3 mL    Inject 1 mL (150 mg) into the muscle every 3 months    Encounter for initial prescription of injectable contraceptive       methylPREDNISolone 4 MG tablet    MEDROL DOSEPAK    21 tablet    Follow package instructions    S/P lumbar fusion       naproxen 500 MG tablet    NAPROSYN    60 tablet    Take 1 tablet (500 mg) by mouth 2 times  daily (with meals)    Chronic bilateral low back pain with left-sided sciatica       NONFORMULARY      Apply 30 mLs topically 4 times daily        nystatin cream    MYCOSTATIN     nystatin 100,000 unit/gram topical cream    Nausea       ondansetron 4 MG ODT tab    ZOFRAN ODT    30 tablet    Take 1 tablet (4 mg) by mouth every 6 hours as needed for nausea    Migraine with aura and without status migrainosus, not intractable       ondansetron 4 MG tablet    ZOFRAN    30 tablet    Take 1-2 tablets (4-8 mg) by mouth every 6 hours as needed for nausea    Migraine with aura and without status migrainosus, not intractable       oxyCODONE IR 10 MG tablet   Start taking on:  11/12/2018    ROXICODONE    112 tablet    Take 1 tablet (10 mg) by mouth every 6 hours as needed for severe pain (Max 8/day)    S/P lumbar fusion       pantoprazole 40 MG EC tablet    PROTONIX    30 tablet    Take 1 tablet (40 mg) by mouth daily Take 30-60 minutes before a meal.    Gastroesophageal reflux disease without esophagitis       prazosin 1 MG capsule    MINIPRESS     Take 1 mg by mouth At Bedtime        promethazine 6.25 MG/5ML syrup    PHENERGAN    560 mL    Take 10 mLs (12.5 mg) by mouth 4 times daily as needed for nausea    Nausea       * rizatriptan 5 MG ODT tab    MAXALT-MLT    18 tablet    Take 1-2 tablets (5-10 mg) by mouth at onset of headache for migraine May repeat in 2 hours. Max 6 tablets/24 hours.    Migraine with aura and without status migrainosus, not intractable       * rizatriptan 5 MG tablet    MAXALT    18 tablet    Take 1-2 tablets (5-10 mg) by mouth at onset of headache for migraine May repeat in 2 hours. Max 6 tablets/24 hours.    Migraine with aura and without status migrainosus, not intractable       sucralfate 1 GM tablet    CARAFATE    60 tablet    Take 1 tablet (1 g) by mouth 4 times daily as needed    Gastroesophageal reflux disease without esophagitis       varenicline 0.5 MG X 11 & 1 MG X 42 tablet    CHANTIX  STARTING MONTH ILAN    53 tablet    Take 0.5 mg tab daily for 3 days, then 0.5 mg tab twice daily for 4 days, then 1 mg twice daily.    Tobacco use disorder       VIRT-PHOS 250 NEUTRAL [phone] MG Tabs      TK 1 T PO BID FOR 15 DAYS        * Notice:  This list has 6 medication(s) that are the same as other medications prescribed for you. Read the directions carefully, and ask your doctor or other care provider to review them with you.

## 2018-11-09 NOTE — PATIENT INSTRUCTIONS
- Oxycodone 8/day     - Start Medrol dose pack (steroid) Take these in the morning     - Call [facility] - comprehensive management and physical therapy     - Recheck 2 weeks

## 2018-11-09 NOTE — TELEPHONE ENCOUNTER
Reason for Call:  Other patient's mother calling stating patient went to the ER after they were seen today in office because pain had increased and they were told to go to the ER if pain got worse. The ER was very rude to patient.     Detailed comments: Patient is wondering if someone could approve patient being able to fill her roxicodone early. I explained that this may not be possible because Zach is out for the day.     Phone Number Patient can be reached at: Cell number on file:    Telephone Information:   Mobile [phone]       Best Time: any    Can we leave a detailed message on this number? YES    Call taken on 11/9/2018 at 4:13 PM by Ruma Messer

## 2018-11-12 ENCOUNTER — MYC MEDICAL ADVICE (OUTPATIENT)
Dept: FAMILY MEDICINE | Facility: OTHER | Age: 28
End: 2018-11-12

## 2018-11-12 NOTE — TELEPHONE ENCOUNTER
Will not fill early. She should continue with plan as discussed in clinic.     Zach Rosario PA-C  [facility]

## 2018-11-12 NOTE — TELEPHONE ENCOUNTER
Called patient to advise of recommendations. She states that her insurance will not cover her taking 8 pills a day but will cover her taking 6 a day. She said tylenol PRN and Ice are helping as well. I advised her that I would let you know this information. Thank you. Provider LJ MICHEL

## 2018-11-15 ENCOUNTER — TELEPHONE (OUTPATIENT)
Dept: FAMILY MEDICINE | Facility: OTHER | Age: 28
End: 2018-11-15

## 2018-11-15 NOTE — TELEPHONE ENCOUNTER
You placed a referral for patient to neurology on 11/1/18.  Patient has not scheduled as of yet.      Please review and forward to team if follow up with the patient is needed.     Thank you!  Natacha/Clinic Referrals Dyad II

## 2018-11-16 NOTE — TELEPHONE ENCOUNTER
Patient was working on this per our last visit. No follow up needed.     Zach Rosario PA-C  [facility]

## 2018-11-18 ENCOUNTER — TELEPHONE (OUTPATIENT)
Dept: FAMILY MEDICINE | Facility: OTHER | Age: 28
End: 2018-11-18

## 2018-11-19 ENCOUNTER — OFFICE VISIT (OUTPATIENT)
Dept: FAMILY MEDICINE | Facility: OTHER | Age: 28
End: 2018-11-19
Payer: MEDICARE

## 2018-11-19 VITALS
TEMPERATURE: 99 F | RESPIRATION RATE: 16 BRPM | OXYGEN SATURATION: 97 % | HEART RATE: 132 BPM | DIASTOLIC BLOOD PRESSURE: 88 MMHG | SYSTOLIC BLOOD PRESSURE: 120 MMHG

## 2018-11-19 DIAGNOSIS — M51.16 LUMBAR DISC DISEASE WITH RADICULOPATHY: ICD-10-CM

## 2018-11-19 DIAGNOSIS — L08.9 INFECTED CYST OF SKIN: Primary | ICD-10-CM

## 2018-11-19 DIAGNOSIS — B37.31 YEAST INFECTION OF THE VAGINA: ICD-10-CM

## 2018-11-19 DIAGNOSIS — E83.39 HYPOPHOSPHATEMIA: ICD-10-CM

## 2018-11-19 DIAGNOSIS — L72.9 INFECTED CYST OF SKIN: Primary | ICD-10-CM

## 2018-11-19 DIAGNOSIS — Z98.1 S/P LUMBAR FUSION: ICD-10-CM

## 2018-11-19 LAB — PHOSPHATE SERPL-MCNC: 4.5 MG/DL (ref 2.5–4.5)

## 2018-11-19 PROCEDURE — 36415 COLL VENOUS BLD VENIPUNCTURE: CPT | Performed by: PHYSICIAN ASSISTANT

## 2018-11-19 PROCEDURE — 99214 OFFICE O/P EST MOD 30 MIN: CPT | Mod: 25 | Performed by: PHYSICIAN ASSISTANT

## 2018-11-19 PROCEDURE — 84100 ASSAY OF PHOSPHORUS: CPT | Performed by: PHYSICIAN ASSISTANT

## 2018-11-19 PROCEDURE — 10060 I&D ABSCESS SIMPLE/SINGLE: CPT | Performed by: PHYSICIAN ASSISTANT

## 2018-11-19 RX ORDER — FLUCONAZOLE 150 MG/1
150 TABLET ORAL ONCE
Qty: 1 TABLET | Refills: 0 | Status: SHIPPED | OUTPATIENT
Start: 2018-11-19 | End: 2019-02-22

## 2018-11-19 RX ORDER — CEPHALEXIN 500 MG/1
500 CAPSULE ORAL 2 TIMES DAILY
Qty: 20 CAPSULE | Refills: 0 | Status: SHIPPED | OUTPATIENT
Start: 2018-11-19 | End: 2019-01-04

## 2018-11-19 RX ORDER — ACETAMINOPHEN 500 MG
1000 TABLET ORAL 3 TIMES DAILY PRN
Qty: 100 TABLET | Refills: 0 | Status: SHIPPED | OUTPATIENT
Start: 2018-11-19 | End: 2019-12-18

## 2018-11-19 ASSESSMENT — PAIN SCALES - GENERAL: PAINLEVEL: SEVERE PAIN (7)

## 2018-11-19 NOTE — MR AVS SNAPSHOT
After Visit Summary   11/19/2018    Katy Islas    MRN: [id number]           Patient Information     Date Of Birth          1990        Visit Information        Provider Department      11/19/2018 1:30 PM Aura Rosario PA-C; [facility]        Today's Diagnoses     Infected cyst of skin    -  1    Hypophosphatemia          Care Instructions      1. Wash area twice a day with antibacterial soap and water        Cover with band-aid as needed     2. Start antibiotic - Keflex twice a day for 10 days     - Await phosphorus lab, will likely be back tomorrow     - You have enough pain medications to take 6 per day and last 19 days (11/30/18) I can not refill or switch until 11/30/18                     Follow-ups after your visit        Your next 10 appointments already scheduled     Nov 26, 2018  1:00 PM CST   Office Visit with Aura Rosario PA-C   [facility]    [address] [id number] [phone]           Bring a current list of meds and any records pertaining to this visit. For Physicals, please bring immunization records and any forms needing to be filled out. Please arrive 10 minutes early to complete paperwork.            Nov 26, 2018  2:00 PM CST   LAB with NL LAB [facility] ([facility])    [address] [facility] [id number] [phone]           Please do not eat 10-12 hours before your appointment if you are coming in fasting for labs on lipids, cholesterol, or glucose (sugar). This does not apply to pregnant women. Water, hot tea and black coffee (with nothing added) are okay. Do not drink other fluids, diet soda or chew gum.              Who to contact     If you have questions or need follow up information about today's clinic visit or your schedule please contact [facility] directly at  [phone].  Normal or non-critical lab and imaging results will be communicated to you by [facility], letter or phone within 4 business days after the clinic has received the results. If you do not hear from us within 7 days, please contact the clinic through [employer] or phone. If you have a critical or abnormal lab result, we will notify you by phone as soon as possible.  Submit refill requests through [facility] or call your pharmacy and they will forward the refill request to us. Please allow 3 business days for your refill to be completed.          Additional Information About Your Visit        [facility] Information     [facility] gives you secure access to your electronic health record. If you see a primary care provider, you can also send messages to your care team and make appointments. If you have questions, please call your primary care clinic.  If you do not have a primary care provider, please call [phone] and they will assist you.        Care EveryWhere ID     This is your Care EveryWhere ID. This could be used by other organizations to access your [place] medical records  [id number]        Your Vitals Were     Pulse Temperature Respirations Pulse Oximetry          132 99  F (37.2  C) (Temporal) 16 97%         Blood Pressure from Last 3 Encounters:   11/19/18 120/88   11/09/18 104/70   11/01/18 126/76    Weight from Last 3 Encounters:   11/01/18 176 lb 6.4 oz (80 kg)   10/08/18 168 lb 12.8 oz (76.6 kg)   09/05/18 167 lb (75.8 kg)              We Performed the Following     DRAIN SKIN ABSCESS SIMPLE/SINGLE     Phosphorus          Today's Medication Changes          These changes are accurate as of 11/19/18  2:32 PM.  If you have any questions, ask your nurse or doctor.               Start taking these medicines.        Dose/Directions    cephALEXin 500 MG capsule   Commonly known as:  KEFLEX   Used for:  Infected cyst of skin   Started by:  Aura Rosario PA-C        Dose:  500 mg   Take 1  capsule (500 mg) by mouth 2 times daily   Quantity:  20 capsule   Refills:  0         These medicines have changed or have updated prescriptions.        Dose/Directions    pantoprazole 40 MG EC tablet   Commonly known as:  PROTONIX   This may have changed:    - when to take this  - additional instructions   Used for:  Gastroesophageal reflux disease without esophagitis        Dose:  40 mg   Take 1 tablet (40 mg) by mouth daily Take 30-60 minutes before a meal.   Quantity:  30 tablet   Refills:  3            Where to get your medicines      These medications were sent to [facility] [address] [place] [address] [address] [facility] [address]  [address] [facility] [address]     Phone:  [phone]     cephALEXin 500 MG capsule                Primary Care Provider Office Phone # Fax #    Auraambreen Rosario PA-C [phone]       [address] [facility] [address]  [facility] [address]        Equal Access to Services     PRATIK [facility] : Hadii aad ku hadasho Soomaali, waaxda luqadaha, qaybta kaalmada adeegyada, waxay idiin haygeraldon norberto santo . So [place] [phone].    ATENCIÓN: Si habla español, tiene a kerr disposición servicios gratuitos de asistencia lingüística. [facility] [phone].    We comply with applicable federal civil rights laws and [place] laws. We do not discriminate on the basis of race, color, national origin, age, disability, sex, sexual orientation, or gender identity.            Thank you!     Thank you for choosing [facility]  for your care. Our goal is always to provide you with excellent care. Hearing back from our patients is one way we can continue to improve our services. Please take a few minutes to complete the written survey that you may receive in the mail after your visit with us. Thank you!             Your Updated Medication List - Protect others around you: Learn how to safely use, store and throw away  your medicines at www.disposemymeds.org.          This list is accurate as of 11/19/18  2:32 PM.  Always use your most recent med list.                   Brand Name Dispense Instructions for use Diagnosis    * albuterol 108 (90 Base) MCG/ACT inhaler    PROAIR HFA/PROVENTIL HFA/VENTOLIN HFA    3 Inhaler    Inhale 2 puffs into the lungs every 6 hours as needed for shortness of breath / dyspnea or wheezing    Mild persistent asthma without complication       * albuterol (2.5 MG/3ML) 0.083% neb solution     50 vial    Take 1 vial (2.5 mg) by nebulization every 6 hours as needed for shortness of breath / dyspnea or wheezing    Mild persistent asthma without complication       ARIPiprazole 15 MG tablet    ABILIFY    90 tablet    Take 1 tablet (15 mg) by mouth At Bedtime    Adjustment disorder with mixed anxiety and depressed mood, Anxiety       * busPIRone 10 MG tablet    BUSPAR          * busPIRone 15 MG tablet    BUSPAR     TK 1 T PO BID        cephALEXin 500 MG capsule    KEFLEX    20 capsule    Take 1 capsule (500 mg) by mouth 2 times daily    Infected cyst of skin       cetirizine 10 MG tablet    zyrTEC    90 tablet    Take 1 tablet (10 mg) by mouth daily    Mild persistent asthma without complication       clonazePAM 0.5 MG tablet    klonoPIN     TK 1/2 TO 1 T D PRA        cyclobenzaprine 10 MG tablet    FLEXERIL    60 tablet    Take 1 tablet (10 mg) by mouth 2 times daily as needed for muscle spasms or other (back pain)    Chronic bilateral low back pain with left-sided sciatica       diazepam 10 MG tablet    VALIUM    60 tablet    Take 1 tablet (10 mg) by mouth every 12 hours as needed for anxiety or sleep (MUSCLE SPASM) (one month supply)    Chronic pain syndrome, Chronic bilateral low back pain with left-sided sciatica, Lumbar disc disease with radiculopathy, S/P lumbar fusion       dicyclomine 20 MG tablet    BENTYL    60 tablet    Take 1 tablet (20 mg) by mouth 4 times daily as needed (ABDOMINAL CRAMPING) AS  NEEDED FOR CRAMPING    Hx of Crohn's disease       DULoxetine 60 MG EC capsule    CYMBALTA    90 capsule    Take 1 capsule (60 mg) by mouth daily    Adjustment disorder with mixed anxiety and depressed mood       EPINEPHrine 0.3 MG/0.3ML injection 2-pack    EPIPEN/ADRENACLICK/or ANY BX GENERIC EQUIV    0.6 mL    Inject 0.3 mLs (0.3 mg) into the muscle once as needed for anaphylaxis    Bee sting allergy       gabapentin 300 MG capsule    NEURONTIN    120 capsule    Take 1-3 capsules (300-900 mg) by mouth 3 times daily    Chronic bilateral low back pain with left-sided sciatica       HYDROcodone-acetaminophen 5-325 MG per tablet    NORCO     Take 1 tablet every 6 hours by oral route as needed for 15 days.    Nausea       LORazepam 0.5 MG tablet    ATIVAN     Take 0.5 mg by mouth 2 times daily        medical cannabis inhalation (Patient's own supply.  Not a prescription)      Inhale into the lungs 3 times daily as needed Reported on 3/28/2017        medroxyPROGESTERone 150 MG/ML injection    DEPO-PROVERA    3 mL    Inject 1 mL (150 mg) into the muscle every 3 months    Encounter for initial prescription of injectable contraceptive       methylPREDNISolone 4 MG tablet    MEDROL DOSEPAK    21 tablet    Follow package instructions    S/P lumbar fusion       naproxen 500 MG tablet    NAPROSYN    60 tablet    Take 1 tablet (500 mg) by mouth 2 times daily (with meals)    Chronic bilateral low back pain with left-sided sciatica       NONFORMULARY      Apply 30 mLs topically 4 times daily        nystatin cream    MYCOSTATIN     nystatin 100,000 unit/gram topical cream    Nausea       ondansetron 4 MG ODT tab    ZOFRAN ODT    30 tablet    Take 1 tablet (4 mg) by mouth every 6 hours as needed for nausea    Migraine with aura and without status migrainosus, not intractable       ondansetron 4 MG tablet    ZOFRAN    30 tablet    Take 1-2 tablets (4-8 mg) by mouth every 6 hours as needed for nausea    Migraine with aura and without  status migrainosus, not intractable       oxyCODONE IR 10 MG tablet    ROXICODONE    112 tablet    Take 1-2 tablets (10-20 mg) by mouth every 4 hours as needed for severe pain (Max 8/day)    S/P lumbar fusion       pantoprazole 40 MG EC tablet    PROTONIX    30 tablet    Take 1 tablet (40 mg) by mouth daily Take 30-60 minutes before a meal.    Gastroesophageal reflux disease without esophagitis       prazosin 1 MG capsule    MINIPRESS     Take 1 mg by mouth At Bedtime        promethazine 6.25 MG/5ML syrup    PHENERGAN    560 mL    Take 10 mLs (12.5 mg) by mouth 4 times daily as needed for nausea    Nausea       * rizatriptan 5 MG ODT tab    MAXALT-MLT    18 tablet    Take 1-2 tablets (5-10 mg) by mouth at onset of headache for migraine May repeat in 2 hours. Max 6 tablets/24 hours.    Migraine with aura and without status migrainosus, not intractable       * rizatriptan 5 MG tablet    MAXALT    18 tablet    Take 1-2 tablets (5-10 mg) by mouth at onset of headache for migraine May repeat in 2 hours. Max 6 tablets/24 hours.    Migraine with aura and without status migrainosus, not intractable       sucralfate 1 GM tablet    CARAFATE    60 tablet    Take 1 tablet (1 g) by mouth 4 times daily as needed    Gastroesophageal reflux disease without esophagitis       varenicline 0.5 MG X 11 & 1 MG X 42 tablet    CHANTIX STARTING MONTH ILAN    53 tablet    Take 0.5 mg tab daily for 3 days, then 0.5 mg tab twice daily for 4 days, then 1 mg twice daily.    Tobacco use disorder       VIRT-PHOS 250 NEUTRAL [id number] MG Tabs      TK 1 T PO BID FOR 15 DAYS        * Notice:  This list has 6 medication(s) that are the same as other medications prescribed for you. Read the directions carefully, and ask your doctor or other care provider to review them with you.

## 2018-11-19 NOTE — PROGRESS NOTES
"  SUBJECTIVE:   Katy Islas is a 28 year old female who presents to clinic today for the following health issues:      HPI     Back: pain medications not working as well.  PT states: \"wanted to ask if I can switch to just 20mg oxycodone w/tylenol cause mine is 10mg. Insurance wont let me take 8/day.\"   - States \"I'm broke, can't afford over the counter tylenol\"   - Was doing better when on steroid but after finished pain came back worse   - Starts physical therapy with [facility] on next Monday       Would like to recheck phosphorus    - Headache, feels like it is going low again       Concern - infection on the neck and hair line  Onset: a few days ago    Description:   Left sided red lump    Intensity: moderate    Progression of Symptoms:  worsening    Accompanying Signs & Symptoms:  Fever, chills, sweating, and back pain  Red, swelling, hurts a lot     Previous history of similar problem:   none    Precipitating factors:   Worsened by: none     Alleviating factors:  Improved by: none  Therapies Tried and outcome: triple antibiotic ointment.           Problem list and histories reviewed & adjusted, as indicated.  Additional history: as documented    ROS:  Constitutional, HEENT, cardiovascular, pulmonary, gi and gu systems are negative, except as otherwise noted.    OBJECTIVE:   /88  Pulse 132  Temp 99  F (37.2  C) (Temporal)  Resp 16  SpO2 97%  There is no height or weight on file to calculate BMI.  GENERAL APPEARANCE: healthy, alert and no distress but in pain due to back  EYES: Eyes grossly normal to inspection, PERRLA, conjunctivae and sclerae without injection or discharge, EOM intact   RESP: Lungs clear to auscultation - no rales, rhonchi or wheezes    CV: Regular rates and rhythm, normal S1 S2, no S3 or S4, no murmur, click or rub, no peripheral edema and peripheral pulses strong and symmetric bilaterally   MS: No musculoskeletal defects are noted and gait is not observed   SKIN:   Left side of neck " - 0.5 cm central infected cyst, raised with fluctuant center, erythema and warmth extending several mm in all directions, very tender to touch   No suspicious lesions or rashes, hydration status appears adeuqate with normal skin turgor   PSYCH: Alert and oriented x3; speech- coherent , normal rate and volume; able to articulate logical thoughts, able to abstract reason, no tangential thoughts, no hallucinations or delusions, mentation appears normal, Mood is euthymic. Affect is appropriate for this mood state and bright. Thought content is free of suicidal ideation, hallucinations, and delusions. Dress is adequate and upkept. Eye contact is good during conversation.         Diagnostic Test Results:  Phosphorus - pending       Procedure Note:  Pause for the cause has been completed prior to the prceedure.   1. Patient was identified by both name and date of birth   2. The correct site was identified   3. Site was marked by provider    4. Verbal authorization  to proceed was obtained   5. Verifed necessary supplies, equipment, and diagnostics are available    6. Time out was performed immediately prior to procedure    Objective: The lesion(s) is/are of the above mentioned size and location and is/are erythematous and edematous. The area was prepped using alcohol swabs and appropriately anesthetized using 0.3 cc of 2% lidocaine with epi. Using the usual technique, incision and drainage of abscess was performed. Scant yellow purulent fluid was expressed. Hemostasis was obtained using pressure. An appropriate dressing was applied. The procedure was well tolerated and without complications.          ASSESSMENT/PLAN:       ICD-10-CM    1. Infected cyst of skin L72.9 DRAIN SKIN ABSCESS SIMPLE/SINGLE    L08.9 cephALEXin (KEFLEX) 500 MG capsule   2. Hypophosphatemia E83.39 Phosphorus   3. Yeast infection of the vagina B37.3 fluconazole (DIFLUCAN) 150 MG tablet   4. S/P lumbar fusion Z98.1 acetaminophen (TYLENOL) 500 MG tablet  "  5. Lumbar disc disease with radiculopathy M51.16 acetaminophen (TYLENOL) 500 MG tablet     1. Infected skin   - I&D as above, small amount purulent fluid expressed, likely infected follicle as is in her hairline   - Wash area twice a day with antibacterial soap and water        Cover with band-aid as needed   - Start antibiotic - Keflex twice a day for 10 days   - Discussed antibiotic use, duration, and side effects  - Watch for signs of infection     2. Hypophosphatemia   - Found during ED visit, was put on 2 week supplement, as of today off that supplement 1 week   - Discussed ideally would wait to check until next week as scheduled   - Patient insistent on lab today, was drawn, will await results     3. Will send diflucan for yeast infection with with antibiotic use     4 & 5.   - Patient s/p SI joint fusion 10/26/18, also recently coccyx removal and prior lumbar fusion  - Was doing well after Medrol dose pack started one week ago, but past few days worsened and pain medications \"not enough\" and patient states \"very frustrated\"   - As previous note, letter patient got from surgeon reviewed (sent to scanning) and my discussion with his nurse is that they will no longer prescribe since patient was asking for too much  - Was given Oxycodone 10 mg 8/day on 11/9/18 to fill on 11/12/18        Total dose 60-80 mg/day, MME       However PER PATIENT insurance would only cover 6/day, but per  was still given #112 on 11/12/18  - Discussed she has enough pain medications to take 6 per day and last 19 days (11/30/18) I can not refill or switch until 11/30/18     I am not willing to change her medication or increase it until runs out      I will not increase from 6/day ever      Patient ask for next prescription dated so she doesn't have to come in, discussed that I require a visit because hopefully she will be feeling better and our next visit will decrease to 5/day      Patient states needs the tylenol, stated I " "would send a separate prescription for this but patient states \"can't afford another prescription\" suggested get OTC   - Recheck 1 week  - Will also be starting physical therapy at [facility] in 1 week, will need to confirm this     The patient indicates understanding of these issues and agrees with the plan.    Follow up: 1 week       Aura Rosario PA-C  [facility]  "

## 2018-11-19 NOTE — PATIENT INSTRUCTIONS
1. Wash area twice a day with antibacterial soap and water        Cover with band-aid as needed     2. Start antibiotic - Keflex twice a day for 10 days     - Await phosphorus lab, will likely be back tomorrow     - You have enough pain medications to take 6 per day and last 19 days (11/30/18) I can not refill or switch until 11/30/18

## 2018-11-19 NOTE — TELEPHONE ENCOUNTER
Patient scheduled for Wednesday. Complaining of infected cyst. Requesting Monday appointment. Please have her come 1:30 on Monday.    Zach Rosario PA-C  [facility]

## 2018-11-19 NOTE — TELEPHONE ENCOUNTER
Attempted to reach pt twice more at 950am and 1130.  Left VM for call back to come at 130pm.  Pt read mychart at 1102am.  Jenni Luo MA

## 2018-11-19 NOTE — TELEPHONE ENCOUNTER
Attempted to call patient, left  also sent a My Chart CDL would like to see her today at 130pm.    Jenni Luo MA

## 2018-11-20 ENCOUNTER — APPOINTMENT (OUTPATIENT)
Dept: CT IMAGING | Facility: CLINIC | Age: 28
End: 2018-11-20
Attending: PHYSICIAN ASSISTANT
Payer: MEDICARE

## 2018-11-20 ENCOUNTER — HOSPITAL ENCOUNTER (EMERGENCY)
Facility: CLINIC | Age: 28
Discharge: HOME | End: 2018-11-20
Attending: PHYSICIAN ASSISTANT | Admitting: PHYSICIAN ASSISTANT
Payer: MEDICARE

## 2018-11-20 ENCOUNTER — TELEPHONE (OUTPATIENT)
Dept: FAMILY MEDICINE | Facility: OTHER | Age: 28
End: 2018-11-20

## 2018-11-20 VITALS
TEMPERATURE: 99.6 F | HEART RATE: 102 BPM | RESPIRATION RATE: 18 BRPM | DIASTOLIC BLOOD PRESSURE: 70 MMHG | SYSTOLIC BLOOD PRESSURE: 115 MMHG | OXYGEN SATURATION: 96 %

## 2018-11-20 DIAGNOSIS — R51.9 ACUTE NONINTRACTABLE HEADACHE, UNSPECIFIED HEADACHE TYPE: ICD-10-CM

## 2018-11-20 DIAGNOSIS — K62.5 RECTAL BLEEDING: ICD-10-CM

## 2018-11-20 DIAGNOSIS — Z87.19 HISTORY OF HEMORRHOIDS: ICD-10-CM

## 2018-11-20 LAB
ALBUMIN SERPL-MCNC: 3.5 G/DL (ref 3.4–5)
ALP SERPL-CCNC: 170 U/L (ref 40–150)
ALT SERPL W P-5'-P-CCNC: 32 U/L (ref 0–50)
ANION GAP SERPL CALCULATED.3IONS-SCNC: 6 MMOL/L (ref 3–14)
AST SERPL W P-5'-P-CCNC: 19 U/L (ref 0–45)
BASOPHILS # BLD AUTO: 0 10E9/L (ref 0–0.2)
BASOPHILS NFR BLD AUTO: 0.3 %
BILIRUB SERPL-MCNC: 0.5 MG/DL (ref 0.2–1.3)
BUN SERPL-MCNC: 16 MG/DL (ref 7–30)
CALCIUM SERPL-MCNC: 9.7 MG/DL (ref 8.5–10.1)
CHLORIDE SERPL-SCNC: 100 MMOL/L (ref 94–109)
CO2 SERPL-SCNC: 31 MMOL/L (ref 20–32)
CREAT SERPL-MCNC: 0.88 MG/DL (ref 0.52–1.04)
CRP SERPL-MCNC: 60.8 MG/L (ref 0–8)
DIFFERENTIAL METHOD BLD: ABNORMAL
EOSINOPHIL NFR BLD AUTO: 0.3 %
ERYTHROCYTE [DISTWIDTH] IN BLOOD BY AUTOMATED COUNT: 11.9 % (ref 10–15)
ERYTHROCYTE [SEDIMENTATION RATE] IN BLOOD BY WESTERGREN METHOD: 34 MM/H (ref 0–20)
GFR SERPL CREATININE-BSD FRML MDRD: 76 ML/MIN/1.7M2
GLUCOSE SERPL-MCNC: 100 MG/DL (ref 70–99)
HCT VFR BLD AUTO: 40.5 % (ref 35–47)
HEMOCCULT STL QL: POSITIVE
HGB BLD-MCNC: 14 G/DL (ref 11.7–15.7)
IMM GRANULOCYTES # BLD: 0 10E9/L (ref 0–0.4)
IMM GRANULOCYTES NFR BLD: 0.5 %
LIPASE SERPL-CCNC: 52 U/L (ref 73–393)
LYMPHOCYTES # BLD AUTO: 0.7 10E9/L (ref 0.8–5.3)
LYMPHOCYTES NFR BLD AUTO: 9.7 %
MCH RBC QN AUTO: 33.1 PG (ref 26.5–33)
MCHC RBC AUTO-ENTMCNC: 34.6 G/DL (ref 31.5–36.5)
MCV RBC AUTO: 96 FL (ref 78–100)
MONOCYTES # BLD AUTO: 0.6 10E9/L (ref 0–1.3)
MONOCYTES NFR BLD AUTO: 7.8 %
NEUTROPHILS # BLD AUTO: 6 10E9/L (ref 1.6–8.3)
NEUTROPHILS NFR BLD AUTO: 81.4 %
NRBC # BLD AUTO: 0 10*3/UL
NRBC BLD AUTO-RTO: 0 /100
PLATELET # BLD AUTO: 302 10E9/L (ref 150–450)
POTASSIUM SERPL-SCNC: 4.5 MMOL/L (ref 3.4–5.3)
PROT SERPL-MCNC: 8.2 G/DL (ref 6.8–8.8)
RBC # BLD AUTO: 4.23 10E12/L (ref 3.8–5.2)
SODIUM SERPL-SCNC: 137 MMOL/L (ref 133–144)
WBC # BLD AUTO: 7.4 10E9/L (ref 4–11)

## 2018-11-20 PROCEDURE — 99285 EMERGENCY DEPT VISIT HI MDM: CPT | Mod: Z6 | Performed by: PHYSICIAN ASSISTANT

## 2018-11-20 PROCEDURE — 96365 THER/PROPH/DIAG IV INF INIT: CPT | Performed by: PHYSICIAN ASSISTANT

## 2018-11-20 PROCEDURE — 86140 C-REACTIVE PROTEIN: CPT | Performed by: PHYSICIAN ASSISTANT

## 2018-11-20 PROCEDURE — 80053 COMPREHEN METABOLIC PANEL: CPT | Performed by: PHYSICIAN ASSISTANT

## 2018-11-20 PROCEDURE — 85025 COMPLETE CBC W/AUTO DIFF WBC: CPT | Performed by: PHYSICIAN ASSISTANT

## 2018-11-20 PROCEDURE — 96375 TX/PRO/DX INJ NEW DRUG ADDON: CPT | Performed by: PHYSICIAN ASSISTANT

## 2018-11-20 PROCEDURE — 82272 OCCULT BLD FECES 1-3 TESTS: CPT | Performed by: PHYSICIAN ASSISTANT

## 2018-11-20 PROCEDURE — 83690 ASSAY OF LIPASE: CPT | Performed by: PHYSICIAN ASSISTANT

## 2018-11-20 PROCEDURE — 96361 HYDRATE IV INFUSION ADD-ON: CPT | Performed by: PHYSICIAN ASSISTANT

## 2018-11-20 PROCEDURE — 99285 EMERGENCY DEPT VISIT HI MDM: CPT | Mod: 25 | Performed by: PHYSICIAN ASSISTANT

## 2018-11-20 PROCEDURE — 74176 CT ABD & PELVIS W/O CONTRAST: CPT

## 2018-11-20 PROCEDURE — 96376 TX/PRO/DX INJ SAME DRUG ADON: CPT | Performed by: PHYSICIAN ASSISTANT

## 2018-11-20 PROCEDURE — 85652 RBC SED RATE AUTOMATED: CPT | Performed by: PHYSICIAN ASSISTANT

## 2018-11-20 PROCEDURE — 25000128 H RX IP 250 OP 636: Performed by: PHYSICIAN ASSISTANT

## 2018-11-20 RX ORDER — SODIUM CHLORIDE 9 MG/ML
1000 INJECTION, SOLUTION INTRAVENOUS CONTINUOUS
Status: DISCONTINUED | OUTPATIENT
Start: 2018-11-20 | End: 2018-11-20 | Stop reason: HOSPADM

## 2018-11-20 RX ORDER — DIPHENHYDRAMINE HYDROCHLORIDE 50 MG/ML
50 INJECTION INTRAMUSCULAR; INTRAVENOUS ONCE
Status: DISCONTINUED | OUTPATIENT
Start: 2018-11-20 | End: 2018-11-20

## 2018-11-20 RX ORDER — MAGNESIUM SULFATE 1 G/100ML
1 INJECTION INTRAVENOUS ONCE
Status: COMPLETED | OUTPATIENT
Start: 2018-11-20 | End: 2018-11-20

## 2018-11-20 RX ORDER — ONDANSETRON 2 MG/ML
4 INJECTION INTRAMUSCULAR; INTRAVENOUS EVERY 30 MIN PRN
Status: DISCONTINUED | OUTPATIENT
Start: 2018-11-20 | End: 2018-11-20 | Stop reason: HOSPADM

## 2018-11-20 RX ORDER — DIPHENHYDRAMINE HYDROCHLORIDE 50 MG/ML
50 INJECTION INTRAMUSCULAR; INTRAVENOUS ONCE
Status: COMPLETED | OUTPATIENT
Start: 2018-11-20 | End: 2018-11-20

## 2018-11-20 RX ORDER — HYDROMORPHONE HYDROCHLORIDE 1 MG/ML
0.5 INJECTION, SOLUTION INTRAMUSCULAR; INTRAVENOUS; SUBCUTANEOUS
Status: DISCONTINUED | OUTPATIENT
Start: 2018-11-20 | End: 2018-11-20 | Stop reason: HOSPADM

## 2018-11-20 RX ADMIN — SODIUM CHLORIDE 1000 ML: 9 INJECTION, SOLUTION INTRAVENOUS at 19:09

## 2018-11-20 RX ADMIN — Medication 0.5 MG: at 17:50

## 2018-11-20 RX ADMIN — ONDANSETRON HYDROCHLORIDE 4 MG: 2 INJECTION, SOLUTION INTRAMUSCULAR; INTRAVENOUS at 17:50

## 2018-11-20 RX ADMIN — SODIUM CHLORIDE 1000 ML: 9 INJECTION, SOLUTION INTRAVENOUS at 20:02

## 2018-11-20 RX ADMIN — MAGNESIUM SULFATE IN DEXTROSE 1 G: 10 INJECTION, SOLUTION INTRAVENOUS at 19:55

## 2018-11-20 RX ADMIN — SODIUM CHLORIDE 1000 ML: 9 INJECTION, SOLUTION INTRAVENOUS at 17:36

## 2018-11-20 RX ADMIN — Medication 0.5 MG: at 18:40

## 2018-11-20 RX ADMIN — ONDANSETRON HYDROCHLORIDE 4 MG: 2 INJECTION, SOLUTION INTRAMUSCULAR; INTRAVENOUS at 18:40

## 2018-11-20 RX ADMIN — DIPHENHYDRAMINE HYDROCHLORIDE 50 MG: 50 INJECTION, SOLUTION INTRAMUSCULAR; INTRAVENOUS at 19:55

## 2018-11-20 NOTE — ED AVS SNAPSHOT
[facility]    [address] [place] [address]    Phone:  [phone]    Fax:  [phone]                                       Katy Islas   MRN: [id number]    Department:  [facility]   Date of Visit:  11/20/2018           After Visit Summary Signature Page     I have received my discharge instructions, and my questions have been answered. I have discussed any challenges I see with this plan with the nurse or doctor.    ..........................................................................................................................................  Patient/Patient Representative Signature      ..........................................................................................................................................  Patient Representative Print Name and Relationship to Patient    ..................................................               ................................................  Date                                   Time    ..........................................................................................................................................  Reviewed by Signature/Title    ...................................................              ..............................................  Date                                               Time          22EPIC Rev 08/18

## 2018-11-20 NOTE — ED AVS SNAPSHOT
[facility] Emergency Department    [address]     [place] [address]    Phone:  [phone]    Fax:  [phone]                                       Katy Islas   MRN: [id number]    Department:  [facility] Emergency Department   Date of Visit:  11/20/2018           Patient Information     Date Of Birth          1990        Your diagnoses for this visit were:     Acute nonintractable headache, unspecified headache type     Rectal bleeding     History of hemorrhoids        You were seen by Marian Barton PA-C.      Follow-up Information     Follow up with [facility] Emergency Department.    Specialty:  EMERGENCY MEDICINE    Why:  If symptoms worsen    Contact information:    [address] [place] [address] [phone]    Additional information:    From [address]: Exit at [address] on south side of [place]. Turn right on [address]. Turn left at stoplight on [address]. [facility] will be in view two blocks ahead        Follow up with Aura Rosario PA-C.    Specialty:  Physician Assistant - Medical    Why:  For ER follow up    Contact information:    [address]  [facility] [address]  [phone]          Discharge Instructions       Your workup today was reassuring.  I suspect rectal bleeding you are experiencing is secondary to hemorrhoids. Try increasing your MiraLAX use to 2-4 times daily to loosen up your stool to alleviate straining that can cause your hemorrhoids to bleed/be painful.  Use your home pain medications as needed for recurrent discomfort.  Follow-up as scheduled with your primary care provider.  You may need to see a general surgeon if your rectal bleeding and pain persists.  Return to the emergency department for any worsening symptoms.    Thank you for choosing [facility]'s Emergency Department. It was a pleasure taking care of you today. If you have any questions,  please call [phone].    Marian Barton PA-C        Discharge References/Attachments     HEMORRHOIDS (ENGLISH)      Your next 10 appointments already scheduled     Nov 26, 2018  1:00 PM CST   Office Visit with Aura Rosario PA-C   [facility] ([facility])    [address] [address] [phone]           Bring a current list of meds and any records pertaining to this visit. For Physicals, please bring immunization records and any forms needing to be filled out. Please arrive 10 minutes early to complete paperwork.            Nov 26, 2018  2:00 PM CST   LAB with [facility]   [facility] ([facility])    [address] [address] [phone]           Please do not eat 10-12 hours before your appointment if you are coming in fasting for labs on lipids, cholesterol, or glucose (sugar). This does not apply to pregnant women. Water, hot tea and black coffee (with nothing added) are okay. Do not drink other fluids, diet soda or chew gum.              24 Hour Appointment Hotline       To make an appointment at any [facility], call [phone] ([phone]). If you don't have a family doctor or clinic, we will help you find one. [facility] are conveniently located to serve the needs of you and your family.             Review of your medicines      CONTINUE these medicines which may have CHANGED, or have new prescriptions. If we are uncertain of the size of tablets/capsules you have at home, strength may be listed as something that might have changed.        Dose / Directions Last dose taken    pantoprazole 40 MG EC tablet   Commonly known as:  PROTONIX   Dose:  40 mg   What changed:    - when to take this  - additional instructions   Quantity:  30 tablet        Take 1 tablet (40 mg) by mouth daily Take 30-60 minutes before a meal.   Refills:  3          Our records show that you are  taking the medicines listed below. If these are incorrect, please call your family doctor or clinic.        Dose / Directions Last dose taken    acetaminophen 500 MG tablet   Commonly known as:  TYLENOL   Dose:  1000 mg   Quantity:  100 tablet        Take 2 tablets (1,000 mg) by mouth 3 times daily as needed for mild pain   Refills:  0        * albuterol 108 (90 Base) MCG/ACT inhaler   Commonly known as:  PROAIR HFA/PROVENTIL HFA/VENTOLIN HFA   Dose:  2 puff   Quantity:  3 Inhaler        Inhale 2 puffs into the lungs every 6 hours as needed for shortness of breath / dyspnea or wheezing   Refills:  3        * albuterol (2.5 MG/3ML) 0.083% neb solution   Dose:  2.5 mg   Quantity:  50 vial        Take 1 vial (2.5 mg) by nebulization every 6 hours as needed for shortness of breath / dyspnea or wheezing   Refills:  11        ARIPiprazole 15 MG tablet   Commonly known as:  ABILIFY   Dose:  15 mg   Quantity:  90 tablet        Take 1 tablet (15 mg) by mouth At Bedtime   Refills:  3        * busPIRone 10 MG tablet   Commonly known as:  BUSPAR        Refills:  5        * busPIRone 15 MG tablet   Commonly known as:  BUSPAR        TK 1 T PO BID   Refills:  5        cephALEXin 500 MG capsule   Commonly known as:  KEFLEX   Dose:  500 mg   Quantity:  20 capsule        Take 1 capsule (500 mg) by mouth 2 times daily   Refills:  0        cetirizine 10 MG tablet   Commonly known as:  zyrTEC   Dose:  10 mg   Quantity:  90 tablet        Take 1 tablet (10 mg) by mouth daily   Refills:  3        clonazePAM 0.5 MG tablet   Commonly known as:  klonoPIN        TK 1/2 TO 1 T D PRA   Refills:  5        cyclobenzaprine 10 MG tablet   Commonly known as:  FLEXERIL   Dose:  10 mg   Quantity:  60 tablet        Take 1 tablet (10 mg) by mouth 2 times daily as needed for muscle spasms or other (back pain)   Refills:  3        diazepam 10 MG tablet   Commonly known as:  VALIUM   Dose:  10 mg   Quantity:  60 tablet        Take 1 tablet (10 mg) by mouth  every 12 hours as needed for anxiety or sleep (MUSCLE SPASM) (one month supply)   Refills:  1        dicyclomine 20 MG tablet   Commonly known as:  BENTYL   Dose:  20 mg   Quantity:  60 tablet        Take 1 tablet (20 mg) by mouth 4 times daily as needed (ABDOMINAL CRAMPING) AS NEEDED FOR CRAMPING   Refills:  6        DULoxetine 60 MG EC capsule   Commonly known as:  CYMBALTA   Dose:  60 mg   Quantity:  90 capsule        Take 1 capsule (60 mg) by mouth daily   Refills:  3        EPINEPHrine 0.3 MG/0.3ML injection 2-pack   Commonly known as:  EPIPEN/ADRENACLICK/or ANY BX GENERIC EQUIV   Dose:  0.3 mg   Quantity:  0.6 mL        Inject 0.3 mLs (0.3 mg) into the muscle once as needed for anaphylaxis   Refills:  1        gabapentin 300 MG capsule   Commonly known as:  NEURONTIN   Dose:  300-900 mg   Quantity:  120 capsule        Take 1-3 capsules (300-900 mg) by mouth 3 times daily   Refills:  3        HYDROcodone-acetaminophen 5-325 MG per tablet   Commonly known as:  NORCO        Take 1 tablet every 6 hours by oral route as needed for 15 days.   Refills:  0        LORazepam 0.5 MG tablet   Commonly known as:  ATIVAN   Dose:  0.5 mg        Take 0.5 mg by mouth 2 times daily   Refills:  1        medical cannabis inhalation (Patient's own supply.  Not a prescription)        Inhale into the lungs 3 times daily as needed Reported on 3/28/2017   Refills:  0        medroxyPROGESTERone 150 MG/ML injection   Commonly known as:  DEPO-PROVERA   Dose:  150 mg   Quantity:  3 mL        Inject 1 mL (150 mg) into the muscle every 3 months   Refills:  3        methylPREDNISolone 4 MG tablet   Commonly known as:  MEDROL DOSEPAK   Quantity:  21 tablet        Follow package instructions   Refills:  0        naproxen 500 MG tablet   Commonly known as:  NAPROSYN   Dose:  500 mg   Quantity:  60 tablet        Take 1 tablet (500 mg) by mouth 2 times daily (with meals)   Refills:  1        NONFORMULARY   Dose:  30 mL   Indication:  Marina  Topical Gel Cream, 1 to 4 pumps  to affect area 4 times daily        Apply 30 mLs topically 4 times daily   Refills:  0        nystatin cream   Commonly known as:  MYCOSTATIN        nystatin 100,000 unit/gram topical cream   Refills:  0        ondansetron 4 MG ODT tab   Commonly known as:  ZOFRAN ODT   Dose:  4 mg   Quantity:  30 tablet        Take 1 tablet (4 mg) by mouth every 6 hours as needed for nausea   Refills:  5        ondansetron 4 MG tablet   Commonly known as:  ZOFRAN   Dose:  4-8 mg   Quantity:  30 tablet        Take 1-2 tablets (4-8 mg) by mouth every 6 hours as needed for nausea   Refills:  5        oxyCODONE IR 10 MG tablet   Commonly known as:  ROXICODONE   Dose:  10-20 mg   Quantity:  112 tablet        Take 1-2 tablets (10-20 mg) by mouth every 4 hours as needed for severe pain (Max 8/day)   Refills:  0        prazosin 1 MG capsule   Commonly known as:  MINIPRESS   Dose:  1 mg        Take 1 mg by mouth At Bedtime   Refills:  5        promethazine 6.25 MG/5ML syrup   Commonly known as:  PHENERGAN   Dose:  12.5 mg   Quantity:  560 mL        Take 10 mLs (12.5 mg) by mouth 4 times daily as needed for nausea   Refills:  3        * rizatriptan 5 MG ODT tab   Commonly known as:  MAXALT-MLT   Dose:  5-10 mg   Quantity:  18 tablet        Take 1-2 tablets (5-10 mg) by mouth at onset of headache for migraine May repeat in 2 hours. Max 6 tablets/24 hours.   Refills:  3        * rizatriptan 5 MG tablet   Commonly known as:  MAXALT   Dose:  5-10 mg   Quantity:  18 tablet        Take 1-2 tablets (5-10 mg) by mouth at onset of headache for migraine May repeat in 2 hours. Max 6 tablets/24 hours.   Refills:  3        sucralfate 1 GM tablet   Commonly known as:  CARAFATE   Dose:  1 g   Quantity:  60 tablet        Take 1 tablet (1 g) by mouth 4 times daily as needed   Refills:  1        varenicline 0.5 MG X 11 & 1 MG X 42 tablet   Commonly known as:  CHANTIX STARTING MONTH ILAN   Quantity:  53 tablet        Take 0.5 mg  tab daily for 3 days, then 0.5 mg tab twice daily for 4 days, then 1 mg twice daily.   Refills:  0        VIRT-PHOS 250 NEUTRAL 155-852-130 MG Tabs        TK 1 T PO BID FOR 15 DAYS   Refills:  0        * Notice:  This list has 6 medication(s) that are the same as other medications prescribed for you. Read the directions carefully, and ask your doctor or other care provider to review them with you.      ASK your doctor about these medications        Dose / Directions Last dose taken    fluconazole 150 MG tablet   Commonly known as:  DIFLUCAN   Dose:  150 mg   Quantity:  1 tablet   Ask about: Should I take this medication?        Take 1 tablet (150 mg) by mouth once for 1 dose   Refills:  0                Procedures and tests performed during your visit     CBC with platelets differential    CRP inflammation    CT Abdomen Pelvis w/o Contrast    Comprehensive metabolic panel    Erythrocyte sedimentation rate auto    Give 20 ounces of water 15 minutes before CT of abdomen    Lipase    Occult blood stool    Peripheral IV catheter      Orders Needing Specimen Collection     None      Pending Results     No orders found from 11/18/2018 to 11/21/2018.            Pending Culture Results     No orders found from 11/18/2018 to 11/21/2018.            Pending Results Instructions     If you had any lab results that were not finalized at the time of your Discharge, you can call the ED Lab Result RN at [phone]. You will be contacted by this team for any positive Lab results or changes in treatment. The nurses are available 7 days a week from 10A to 6:30P.  You can leave a message 24 hours per day and they will return your call.        Thank you for choosing [facility]       Thank you for choosing [facility] for your care. Our goal is always to provide you with excellent care. Hearing back from our patients is one way we can continue to improve our services. Please take a few minutes to complete the written survey that you may  receive in the mail after you visit with us. Thank you!        [employer] Information     [employer] gives you secure access to your electronic health record. If you see a primary care provider, you can also send messages to your care team and make appointments. If you have questions, please call your primary care clinic.  If you do not have a primary care provider, please call [phone] and they will assist you.        Care EveryWhere ID     This is your Care EveryWhere ID. This could be used by other organizations to access your [facility] medical records  [id number]        Equal Access to Services     [facility] AreaPIETER : Ruth Chen, mayank kelly, champ ragland, susan santo . So [facility] [phone].    ATENCIÓN: Si habla español, tiene a kerr disposición servicios gratuitos de asistencia lingüística. Llame al [phone].    We comply with applicable federal civil rights laws and [place] laws. We do not discriminate on the basis of race, color, national origin, age, disability, sex, sexual orientation, or gender identity.            After Visit Summary       This is your record. Keep this with you and show to your community pharmacist(s) and doctor(s) at your next visit.

## 2018-11-20 NOTE — PROGRESS NOTES
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      HPI  Back Pain Follow Up      Description:   Location of pain:  Left lower back and left leg past knee  Character of pain: dull ache: stiff  Pain radiation: Does not radiate  Since last visit, pain is:  improved  New numbness or weakness in legs, not attributed to pain:  no     Intensity: Currently 0/10: with activity 6    History:   Pain interferes with job: Not applicable  Therapies tried without relief: none  Therapies tried with relief: hot showers, cold, opioids, Physical Therapy, resting, sitting and stretching     - Stopped taking pain medications for 3 days, then had large bowel movement and felt much better  - Now just taking as needed - around 4/day   - Taking Tylenol more than the pain medication   - No withdrawal   - Starts physical therapy after this       - Wants something else for allergies   - Zyrtec doesn't work, 2 years   - Failed on Allegra-D in the past, doesn't like decongestant      Plan from last visit 11/19/18  - Patient s/p SI joint fusion 10/26/18, also recently coccyx removal and prior lumbar fusion  - Was doing well after Medrol dose pack started one week ago, but past few days worsened and pain medications \"not enough\" and patient states \"very frustrated\"   - As previous note, letter patient got from surgeon reviewed (sent to scanning) and my discussion with his nurse is that they will no longer prescribe since patient was asking for too much  - Was given Oxycodone 10 mg 8/day on 11/9/18 to fill on 11/12/18        Total dose 60-80 mg/day, MME       However PER PATIENT insurance would only cover 6/day, but per  was still given #112 on 11/12/18  - Discussed she has enough pain medications to take 6 per day and last 19 days (11/30/18) I can not refill or switch until 11/30/18     I am not willing to " "change her medication or increase it until runs out      I will not increase from 6/day ever      Patient ask for next prescription dated so she doesn't have to come in, discussed that I require a visit because hopefully she will be feeling better and our next visit will decrease to 5/day      Patient states needs the tylenol, stated I would send a separate prescription for this but patient states \"can't afford another prescription\" suggested get OTC   - Recheck 1 week  - Will also be starting physical therapy at [facility] in 1 week, will need to confirm this         Problem list and histories reviewed & adjusted, as indicated.  Additional history: as documented        ROS:  Constitutional, HEENT, cardiovascular, pulmonary, gi and gu systems are negative, except as otherwise noted.    OBJECTIVE:   /84  Pulse 100  Temp 99.5  F (37.5  C) (Temporal)  Resp 16  Wt 169 lb 12.8 oz (77 kg)  LMP 11/23/2018 (Exact Date)  Breastfeeding? No  BMI 31.65 kg/m2  Body mass index is 31.65 kg/(m^2).  GENERAL APPEARANCE: healthy, alert and no distress  EYES: Eyes grossly normal to inspection, PERRLA, conjunctivae and sclerae without injection or discharge, EOM intact   MS: No musculoskeletal defects are noted and gait is age appropriate without ataxia   SKIN: No suspicious lesions or rashes, hydration status appears adeuqate with normal skin turgor   NEURO: Strength 5+ bilateral lower extremities, sensation intact in distal bilateral lower extremities, mentation- intact, speech- normal, reflexes- symmetric in bilateral lower extremities  BACK: No CVA tenderness, no paralumbar tenderness, no midline tenderness, normal ROM   PSYCH: Alert and oriented x3; speech- coherent , normal rate and volume; able to articulate logical thoughts, able to abstract reason, no tangential thoughts, no hallucinations or delusions, mentation appears normal, Mood is euthymic. Affect is appropriate for this mood state and bright. Thought content is " free of suicidal ideation, hallucinations, and delusions. Dress is adequate and upkept. Eye contact is good during conversation.       Diagnostic Test Results:  Pain urine drug screen - pending     ASSESSMENT/PLAN:       ICD-10-CM    1. S/P lumbar fusion Z98.1 Pain Drug Scr UR W Rptd Meds     oxyCODONE-acetaminophen (PERCOCET)  MG per tablet     Pain Drug Scr UR W Rptd Meds     DISCONTINUED: oxyCODONE-acetaminophen (PERCOCET)  MG per tablet   2. Lumbar disc disease with radiculopathy M51.16 Pain Drug Scr UR W Rptd Meds     oxyCODONE-acetaminophen (PERCOCET)  MG per tablet     Pain Drug Scr UR W Rptd Meds     DISCONTINUED: oxyCODONE-acetaminophen (PERCOCET)  MG per tablet   3. Chronic bilateral low back pain with left-sided sciatica M54.42 Pain Drug Scr UR W Rptd Meds    G89.29 oxyCODONE-acetaminophen (PERCOCET)  MG per tablet     Pain Drug Scr UR W Rptd Meds     DISCONTINUED: oxyCODONE-acetaminophen (PERCOCET)  MG per tablet   4. Chronic pain syndrome G89.4 Pain Drug Scr UR W Rptd Meds     oxyCODONE-acetaminophen (PERCOCET)  MG per tablet     Pain Drug Scr UR W Rptd Meds     DISCONTINUED: oxyCODONE-acetaminophen (PERCOCET)  MG per tablet   5. Seasonal allergic rhinitis due to pollen J30.1 levocetirizine (XYZAL) 5 MG tablet     1-4. Pain   - Patient s/p SI joint fusion 10/26/18, also coccyx resection (10/18/17 with subsequent wound infection requiring wound vac) and prior lumbar fusion (L4-5 on 2/23/17)  - As previous note, letter patient got from surgeon reviewed (sent to scanning) and my discussion with his nurse is that they will no longer prescribe since patient was asking for too much  - Self tapered from 6/day to 4/day, trying to take as needed   - Next script due 11/30/18, dated as such      Oxycodone-APAP  mg, max 4/day      Total daily dose - Oxycodone 40 mg MME 60, APAP 1,300 mg       Instructed to keep tapering as tolerated, 3/day for a few days, then  2/day, etc.      Will need appointment for further refills   -  reviewed  - Since has been chronic issue with patient, recommend CSA, discussed and signed today 11/26/18  - Will also update Utox today (11/26/18)   - Will also be starting physical therapy at [facility] today    5. Allergies  - Per patient Zyrtec for 2 years, no longer working   - Will switch to Zyzal, reviewed use and side effects       The patient indicates understanding of these issues and agrees with the plan.    Follow up: 1 month if needed     Due to language barrier, an  was present during the history-taking and subsequent discussion (and for part of the physical exam) with this patient.        Aura Rosario PA-C  [facility]

## 2018-11-20 NOTE — TELEPHONE ENCOUNTER
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 Today she started to have blood in her stool with clots. Sent her to ED-FYI only    Katy Islas is a 28 year old female who calls with blood in stool.    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 When she wiped there was a lot of blood and some clots. Stomach pains. Chills and sweats. 99.8. Feels like her heart is racing fast. Blood is mixed with the stool, bright red.  Onset/duration:  today  Precip. factors:  crohn's disease    Allergies:   Allergies   Allergen Reactions     Ativan [Lorazepam] Hives     At the IV site     Baclofen      hives     Bees Hives, Swelling and Difficulty breathing     Caffeine      Contrast Dye      Hives,   Updated 5/10/2016 CT Contrast.     Iodine Hives     Methocarbamol Swelling     Metoclopramide      Other reaction(s): Tremors  LW Reaction: shaking/sweating     Midazolam      Other reaction(s): Agitation     Monosodium Glutamate      Morphine Other (See Comments)     Difficulty with urination     Nsaids Other (See Comments)     GI BLEED x2     Other (Do Not Use) Other (See Comments)     Xanaflex- pt becomes disoriented and loses bladder control     Reglan [Metoclopramide Hcl] Other (See Comments)     shaking     Soma [Carisoprodol] Visual Disturbance     Sleep walking     Tizanidine      Topamax Other (See Comments)     Topamax [Topiramate] Nausea     Tingling  GI/Vomit     Tramadol      Severe Headache, Seizure Risk     Tylenol W/Codeine [Acetaminophen-Codeine] Nausea and Itching     Tylenol 3     Valium Other (See Comments)     Becomes aggressive and angry     Versed Other (See Comments)     Zolmitriptan      Makes face feel like its twitching     Droperidol Anxiety     Flu Virus Vaccine Rash      Arm swelling       NURSING PLAN: Routed to provider Yes    RECOMMENDED DISPOSITION:  To ED, another person  to drive  Will comply with recommendation: Yes  If further questions/concerns or if symptoms do not improve, worsen or new symptoms develop, call your PCP or [facility] as soon as possible.      Guideline used: Stools, abnormal  Telephone Triage Protocols for Nurses, Fifth Edition, Telma Jimenez RN

## 2018-11-20 NOTE — ED PROVIDER NOTES
"  History     Chief Complaint   Patient presents with     Abdominal Pain     Rectal Bleeding     A  was used.     Katy Islas is a 28 year old female who has a history of Crohn's presents to the emergency department for with rectal bleeding that began in the last couple days. Patient reports when she has a bowel movement she has bright red blood mixed with formed brown stools and when she wipes there is only blood with clots. She states her rectum is really sore. Patient reports having a fever, headache, lower left abdomen pain that feels like a knife going in her, especially when she pushes on her abdomen. She tried the Oxycodone 10 mg that she has at home for the pain with no relief. Patient reports she has bowel movements twice a day and uses Mirilax daily. Patient denies any diarrhea or vaginal bleeding, states she had her LMP last week. Patient also reports having a \"cyst\" popped yesterday on her left neck in the clinic and she is worried about the spot being infected.  She is currently taking Keflex for her neck cyst.        Problem List:    Patient Active Problem List    Diagnosis Date Noted     Hypophosphatemia 11/01/2018     Priority: Medium     Patellar maltracking, right 09/05/2018     Priority: Medium     Right anterior knee pain 09/05/2018     Priority: Medium     Melanocytic nevus, unspecified location 07/23/2018     Priority: Medium     Dysplastic skin lesion 07/23/2018     Priority: Medium     Iliotibial band syndrome affecting lower leg, right 12/21/2017     Priority: Medium     Chondromalacia of right patellofemoral joint 12/21/2017     Priority: Medium     Postoperative pain 11/13/2017     Priority: Medium     Closed fracture of coccyx with nonunion, subsequent encounter 10/12/2017     Priority: Medium     Upper GI bleed - suspected 07/01/2017     Priority: Medium     Tobacco use disorder 07/01/2017     Priority: Medium     History of pseudoseizure 07/01/2017     Priority: " Medium     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) 03/10/2017     Priority: Medium     Lumbar disc disease with radiculopathy 02/23/2017     Priority: Medium     S/P lumbar fusion 02/23/2017     Priority: Medium     Dr. YOVANI Millan, 2/23/17       Vitamin D deficiency 01/06/2017     Priority: Medium     Atypical mole 01/06/2017     Priority: Medium     Mild persistent asthma without complication 12/02/2016     Priority: Medium     Chronic bilateral low back pain with left-sided sciatica 12/02/2016     Priority: Medium     Bee sting allergy 09/16/2016     Priority: Medium     Gastroesophageal reflux disease without esophagitis 08/26/2016     Priority: Medium     Herpes simplex virus infection 11/12/2015     Priority: Medium     Adjustment disorder with mixed anxiety and depressed mood 10/14/2015     Priority: Medium     Marijuana abuse 02/22/2015     Priority: Medium     Grand mal seizure disorder (H) 10/08/2013     Priority: Medium     Bipolar disorder (H) 01/31/2013     Priority: Medium     Problem list name updated by automated process. Provider to review       Nausea and vomiting 12/13/2012     Priority: Medium     Oral thrush 06/25/2012     Priority: Medium     Chronic pain 02/09/2012     Priority: Medium     Patient is followed by Aura Rosario PA-C for ongoing prescription of pain medication.  All refills should only be approved by this provider, or covering partner.    Medication(s): Norco    Maximum quantity per month: 70  Clinic visit frequency required: Q 2 months     Controlled substance agreement:   Discussed and signed 4/25/17    Encounter-Level CSA - 01/12/2015:                 Controlled Substance Agreement - Scan on 1/26/2015  9:14 AM : Controlled Medication Agreement-01/12/15 (below)            Pain Clinic evaluation in the past: Yes       Date/Location:   [facility], fall 2016    DIRE Total Score(s):    4/25/2017   Total Score 17       Last [facility] website verification:  done on  4/25/17 by LOVE Maki   [email or web address]           Anxiety 09/22/2011     Priority: Medium     Mild major depression (H) 08/29/2011     Priority: Medium     Crohn's colitis (H) 08/04/2009     Priority: Medium     Dysmenorrhea 05/11/2007     Priority: Medium     Benign neoplasm of skin of lower limb, including hip 03/16/2004     Priority: Medium     Migraine      Priority: Medium     Dr. Farrar - Neurology.  Now on Inderal.  Seems to be working.  Follow-up 9/08  Problem list name updated by automated process. Provider to review       Hearing loss      Priority: Medium     Congenital  Problem list name updated by automated process. Provider to review       Allergic rhinitis      Priority: Medium     Problem list name updated by automated process. Provider to review          Past Medical History:    Past Medical History:   Diagnosis Date     Abdominal pain 10/31- 11/4/2005     Allergic rhinitis, cause unspecified      Arthritis      C. 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     FUSION SPINE POSTERIOR MINIMALLY INVASIVE ONE LEVEL N/A 2/23/2017    Procedure: FUSION SPINE POSTERIOR MINIMALLY INVASIVE ONE LEVEL;  L4-5 Oblique Lateral Lumbar Interbody  Fusion   Epidural steroid injection.   Transpedicular Bone marrow aspiration;  Surgeon: Jeniffer Eugene MD;  Location: [facility] OR     HC COLONOSCOPY THRU STOMA WITH BIOPSY  10/29/2003    Impression is that of normal appearing colonoscopy, without evidence of rectal bleeding.     HC COLONOSCOPY THRU STOMA, DIAGNOSTIC  10/00    normal     HC COLONOSCOPY THRU STOMA, DIAGNOSTIC  Oct 2009    Dr López- normal     HC EEG AWAKE AND SLEEP      abnormal     HC MRI BRAIN W/O CONTRAST  12/00    normal     HC REMOVAL GALLBLADDER  8/5/2009    [facility], [facility].     HC UGI ENDOSCOPY DIAG W BIOPSY  11/11/09    Normal esophagus     HC UGI ENDOSCOPY, SIMPLE EXAM  7/00, 10/00    mild chronic esophagitis and duodenitis, neg H pylori     HC UGI ENDOSCOPY, SIMPLE EXAM  01/20/2005    Esophagogastroduodenoscopy, colonoscopy with biopsies.  [facility] UGI ENDOSCOPY, SIMPLE EXAM  7/1/2009    [facility], [facility].      UGI ENDOSCOPY, SIMPLE EXAM  11/11/2009    attempted upper GI, pt. could not tolerate procedure:[facility]     ORTHOPEDIC SURGERY  October 19,2011    diskectomy L4-L5       Family History:    Family History   Problem Relation Age of Onset     GASTROINTESTINAL DISEASE Brother      severe Crohn's     Neurologic Disorder Brother      Seizures post head injury     Depression Brother      Substance Abuse Brother      Genitourinary Problems Father      kidney stones     Diabetes Father      HEART DISEASE Father      Open heart surgery     Breast Cancer Maternal Grandmother      Parkinsonism Maternal Grandmother      Cerebrovascular Disease Paternal Grandmother      Cancer Maternal Grandfather      Lung     Cardiovascular Paternal Grandfather      Heart Attack     Substance Abuse Mother      Lung Cancer Paternal Uncle      Cancer Paternal Uncle      Lung Cancer Maternal Uncle        Social History:  Marital Status:  Single [1]  Social History   Substance Use Topics     Smoking  status: Former Smoker     Packs/day: 0.50     Types: Cigarettes     Smokeless tobacco: Never Used      Comment: quit July 2018 - 4 days no cig     Alcohol use 0.6 oz/week     1 Cans of beer per week      Comment: once in while         Medications:      acetaminophen (TYLENOL) 500 MG tablet   albuterol (2.5 MG/3ML) 0.083% neb solution   albuterol (PROAIR HFA/PROVENTIL HFA/VENTOLIN HFA) 108 (90 Base) MCG/ACT Inhaler   ARIPiprazole (ABILIFY) 15 MG tablet   busPIRone (BUSPAR) 10 MG tablet   busPIRone (BUSPAR) 15 MG tablet   cephALEXin (KEFLEX) 500 MG capsule   cetirizine (ZYRTEC) 10 MG tablet   clonazePAM (KLONOPIN) 0.5 MG tablet   cyclobenzaprine (FLEXERIL) 10 MG tablet   diazepam (VALIUM) 10 MG tablet   dicyclomine (BENTYL) 20 MG tablet   DULoxetine (CYMBALTA) 60 MG EC capsule   EPINEPHrine (EPIPEN/ADRENACLICK/OR ANY BX GENERIC EQUIV) 0.3 MG/0.3ML injection 2-pack   gabapentin (NEURONTIN) 300 MG capsule   HYDROcodone-acetaminophen (NORCO) 5-325 MG per tablet   K Phos Mono-Sod Phos Di & Mono (VIRT-PHOS 250 NEUTRAL) 155-852-130 MG TABS   LORazepam (ATIVAN) 0.5 MG tablet   medical cannabis inhalation (Patient's own supply.  Not a prescription)   medroxyPROGESTERone (DEPO-PROVERA) 150 MG/ML injection   methylPREDNISolone (MEDROL DOSEPAK) 4 MG tablet   naproxen (NAPROSYN) 500 MG tablet   NONFORMULARY   nystatin (MYCOSTATIN) cream   ondansetron (ZOFRAN ODT) 4 MG ODT tab   ondansetron (ZOFRAN) 4 MG tablet   oxyCODONE IR (ROXICODONE) 10 MG tablet   pantoprazole (PROTONIX) 40 MG EC tablet   prazosin (MINIPRESS) 1 MG capsule   promethazine (PHENERGAN) 6.25 MG/5ML syrup   rizatriptan (MAXALT) 5 MG tablet   rizatriptan (MAXALT-MLT) 5 MG ODT tab   sucralfate (CARAFATE) 1 GM tablet   varenicline (CHANTIX STARTING MONTH PAK) 0.5 MG X 11 & 1 MG X 42 tablet         Review of Systems   All other systems reviewed and are negative.      Physical Exam   BP: 116/80  Pulse: 130  Temp: 99.6  F (37.6  C)  Resp: 18  SpO2: 94 %      Physical  Exam   Constitutional: She is oriented to person, place, and time. She appears well-developed and well-nourished. No distress.   HENT:   Head: Normocephalic and atraumatic.   Mouth/Throat: Oropharynx is clear and moist. No oropharyngeal exudate.   Eyes: Conjunctivae are normal. Pupils are equal, round, and reactive to light. No scleral icterus.   Neck: Normal range of motion. Neck supple.   Left neck near hairline there is a small scabbed over lesion with mild erythema surrounding, no underlying fluctuance.  Approximately 1 cm in diameter.   Cardiovascular: Regular rhythm, normal heart sounds and intact distal pulses.  Tachycardia present.  Exam reveals no gallop.    No murmur heard.  Pulmonary/Chest: Effort normal and breath sounds normal. No respiratory distress. She has no wheezes. She has no rales.   Abdominal: Soft. Bowel sounds are normal. She exhibits no distension. There is tenderness (mild, left sided). There is no rebound and no guarding.   Genitourinary: Rectal exam shows guaiac positive stool.   Genitourinary Comments: Rectal exam revealed no external hemorrhoids.  Tender with digital examination so subsequently limited exam.   Musculoskeletal: Normal range of motion. She exhibits no edema or tenderness.   Neurological: She is alert and oriented to person, place, and time.   Skin: Skin is warm and dry. No rash noted. She is not diaphoretic.   Psychiatric: She has a normal mood and affect. Her behavior is normal. Judgment normal.   Nursing note and vitals reviewed.    -0o0-  ED Course     ED Course     Procedures      Results for orders placed or performed during the hospital encounter of 11/20/18 (from the past 24 hour(s))   Occult blood stool   Result Value Ref Range    Occult Blood Positive (A) NEG^Negative   CBC with platelets differential   Result Value Ref Range    WBC 7.4 4.0 - 11.0 10e9/L    RBC Count 4.23 3.8 - 5.2 10e12/L    Hemoglobin 14.0 11.7 - 15.7 g/dL    Hematocrit 40.5 35.0 - 47.0 %     MCV 96 78 - 100 fl    MCH 33.1 (H) 26.5 - 33.0 pg    MCHC 34.6 31.5 - 36.5 g/dL    RDW 11.9 10.0 - 15.0 %    Platelet Count 302 150 - 450 10e9/L    Diff Method Automated Method     % Neutrophils 81.4 %    % Lymphocytes 9.7 %    % Monocytes 7.8 %    % Eosinophils 0.3 %    % Basophils 0.3 %    % Immature Granulocytes 0.5 %    Nucleated RBCs 0 0 /100    Absolute Neutrophil 6.0 1.6 - 8.3 10e9/L    Absolute Lymphocytes 0.7 (L) 0.8 - 5.3 10e9/L    Absolute Monocytes 0.6 0.0 - 1.3 10e9/L    Absolute Basophils 0.0 0.0 - 0.2 10e9/L    Abs Immature Granulocytes 0.0 0 - 0.4 10e9/L    Absolute Nucleated RBC 0.0    Comprehensive metabolic panel   Result Value Ref Range    Sodium 137 133 - 144 mmol/L    Potassium 4.5 3.4 - 5.3 mmol/L    Chloride 100 94 - 109 mmol/L    Carbon Dioxide 31 20 - 32 mmol/L    Anion Gap 6 3 - 14 mmol/L    Glucose 100 (H) 70 - 99 mg/dL    Urea Nitrogen 16 7 - 30 mg/dL    Creatinine 0.88 0.52 - 1.04 mg/dL    GFR Estimate 76 >60 mL/min/1.7m2    GFR Estimate If Black >90 >60 mL/min/1.7m2    Calcium 9.7 8.5 - 10.1 mg/dL    Bilirubin Total 0.5 0.2 - 1.3 mg/dL    Albumin 3.5 3.4 - 5.0 g/dL    Protein Total 8.2 6.8 - 8.8 g/dL    Alkaline Phosphatase 170 (H) 40 - 150 U/L    ALT 32 0 - 50 U/L    AST 19 0 - 45 U/L   Lipase   Result Value Ref Range    Lipase 52 (L) 73 - 393 U/L   CRP inflammation   Result Value Ref Range    CRP Inflammation 60.8 (H) 0.0 - 8.0 mg/L   Erythrocyte sedimentation rate auto   Result Value Ref Range    Sed Rate 34 (H) 0 - 20 mm/h   CT Abdomen Pelvis w/o Contrast    Narrative    CT ABDOMEN AND PELVIS WITHOUT CONTRAST   11/20/2018 6:10 PM     HISTORY: Left lower quadrant pain and bloody stools.    TECHNIQUE: No IV contrast material. Radiation dose for this scan was  reduced using automated exposure control, adjustment of the mA and/or  kV according to patient size, or iterative reconstruction technique.    COMPARISON: None.    FINDINGS:    Abdomen: Cholecystectomy. Normal appearance of the  liver, spleen,  pancreas and adrenal glands. There are multiple tiny calculi or  pre-calculi in the renal collecting systems or medullary pyramids with  no evidence of hydronephrosis. No ureteral dilatation. No free air,  free fluid or adenopathy. Normal-appearing stomach, small bowel, colon  and appendix. No hernia. No colonic diverticulosis or diverticulitis.    Pelvis: Postoperative changes in the lower lumbar spine from fusion  surgery. The bladder, uterus and ovaries appear normal.      Impression    IMPRESSION:  1. No specific etiology seen for the left lower quadrant pain and  bloody stools.  2. Multiple tiny calculi or pre-calculi in the renal collecting system  or medullary pyramids. No hydronephrosis.    IMAN ALCANTARA MD     *Note: Due to a large number of results and/or encounters for the requested time period, some results have not been displayed. A complete set of results can be found in Results Review.       Medications   0.9% sodium chloride BOLUS (0 mLs Intravenous Stopped 11/20/18 1907)     Followed by   sodium chloride 0.9% infusion (0 mLs Intravenous Stopped 11/20/18 2034)   ondansetron (ZOFRAN) injection 4 mg (4 mg Intravenous Given 11/20/18 1840)   HYDROmorphone (PF) (DILAUDID) injection 0.5 mg (0.5 mg Intravenous Given 11/20/18 1840)   0.9% sodium chloride BOLUS (0 mLs Intravenous Stopped 11/20/18 2002)   diphenhydrAMINE (BENADRYL) injection 50 mg (50 mg Intravenous Given 11/20/18 1955)   magnesium sulfate 1 gm in 100mL D5W intermittent infusion (0 g Intravenous Stopped 11/20/18 2015)       Assessments & Plan (with Medical Decision Making)  Katy Islas is a 28 year old female with a complex medical history who presented to the ED complaining of rectal bleeding and pain along with left-sided abdominal pain.  On arrival to the ED patient was tachycardic with otherwise normal vital signs.  She did have mild left-sided abdominal tenderness on exam along with rectal tenderness.  No evidence of  external hemorrhoids.  IV access obtained and fluids as well as Dilaudid and Zofran administered initially for her symptoms.  Labs today revealed a normal white count of 7.4.  Fecal guaiac was positive however she had a reassuring hemoglobin of 14.0.  Her CRP was elevated at 60.8, and sed rate 34.  She did just have back surgery however.  Alk phos mildly elevated at 170, otherwise normal CMP.  CT scan of the abdomen was negative for colitis, diverticulitis, or any acute causes for her symptoms today.  Looking at her previous colonoscopies from August 2018 they did note hemorrhoids which look to be internal.  I suspect that her rectal bleeding is likely related to internal hemorrhoids.  I explained this to the patient along with her results and overall she was very reassured.  She felt comfortable managing the symptoms at home going forward.  She did complain of a headache while here however still given Benadryl and magnesium with good relief.  Heart rate improved with IV fluids. For her suspected hemorrhoids and rectal pain I advised that she increase her MiraLAX use to 2-4 times daily to ensure her stools are nice and soft to eliminate need to strain.  She can use her home pain medications as needed as well as her home nausea medicine.  She has a follow-up appointment already scheduled in the clinic for Monday, 6 days from now, which I think is reasonable.  She may benefit from having her hemoglobin rechecked if she persistently has bloody stools.  May also benefit from seeing a general surgeon to evaluate bloody stools further if things are not improving.  She was advised to return to the emergency department for any worsening concerns in the meantime.  All questions were answered and patient was discharged home in suitable condition.     I have reviewed the nursing notes.    I have reviewed the findings, diagnosis, plan and need for follow up with the patient.    Discharge Medication List as of 11/20/2018  8:35  PM          Final diagnoses:   Acute nonintractable headache, unspecified headache type   Rectal bleeding   History of hemorrhoids     This document serves as a record of services personally performed by Marian Barton PA. It was created on their behalf by Galina Tenorio, a trained medical scribe. The creation of this record is based on the provider's personal observations and the statements of the patient. This document has been checked and approved by the attending provider.    Note: Chart documentation done in part with Dragon Voice Recognition software. Although reviewed after completion, some word and grammatical errors may remain.    11/20/2018   [facility] EMERGENCY DEPARTMENT     Marian Barton PA-C  11/20/18 [id number]

## 2018-11-20 NOTE — TELEPHONE ENCOUNTER
Reason for call:  Symptom  Reason for call:  Patient reporting a symptom    Symptom or request: blood in stool, rectal pain    Duration (how long have symptoms been present): today    Have you been treated for this before? No    Additional comments: sending to triage    Phone Number patient can be reached at:  Home number on file [phone] (home)    Best Time:  anytime    Can we leave a detailed message on this number:  YES    Call taken on 11/20/2018 at 3:21 PM by Sheryl Ruby

## 2018-11-20 NOTE — PROGRESS NOTES
Savana Burns    Your phosphorus results were normal range. I don't think we need any further supplementation.     The results are attached for your review.       Zach Rosario PA-C

## 2018-11-21 ENCOUNTER — MYC MEDICAL ADVICE (OUTPATIENT)
Dept: FAMILY MEDICINE | Facility: OTHER | Age: 28
End: 2018-11-21

## 2018-11-21 DIAGNOSIS — K64.4 EXTERNAL HEMORRHOIDS: Primary | ICD-10-CM

## 2018-11-21 DIAGNOSIS — K21.9 GASTROESOPHAGEAL REFLUX DISEASE WITHOUT ESOPHAGITIS: ICD-10-CM

## 2018-11-21 DIAGNOSIS — K50.118 CROHN'S DISEASE OF COLON WITH OTHER COMPLICATION (H): ICD-10-CM

## 2018-11-21 DIAGNOSIS — K64.8 INTERNAL HEMORRHOID: ICD-10-CM

## 2018-11-21 RX ORDER — POLYETHYLENE GLYCOL 3350 17 G/17G
1 POWDER, FOR SOLUTION ORAL DAILY
Qty: 510 G | Refills: 1 | Status: SHIPPED | OUTPATIENT
Start: 2018-11-21 | End: 2019-04-17

## 2018-11-21 NOTE — DISCHARGE INSTRUCTIONS
Your workup today was reassuring.  I suspect rectal bleeding you are experiencing is secondary to hemorrhoids. Try increasing your MiraLAX use to 2-4 times daily to loosen up your stool to alleviate straining that can cause your hemorrhoids to bleed/be painful.  Use your home pain medications as needed for recurrent discomfort.  Follow-up as scheduled with your primary care provider.  You may need to see a general surgeon if your rectal bleeding and pain persists.  Return to the emergency department for any worsening symptoms.    Thank you for choosing [facility]'s Emergency Department. It was a pleasure taking care of you today. If you have any questions, please call [phone].    Marian Barton PA-C

## 2018-11-21 NOTE — ED NOTES
Pt states she is feeling better.  Ready to go home.  No adverse reaction to medications.  HA improved.

## 2018-11-21 NOTE — ED NOTES
Pt up to the BR to void.  Up and down from cart independently, but w/discomfort from recent surgery. Pt also states she is getting hungry.

## 2018-11-26 ENCOUNTER — OFFICE VISIT (OUTPATIENT)
Dept: FAMILY MEDICINE | Facility: OTHER | Age: 28
End: 2018-11-26
Payer: MEDICARE

## 2018-11-26 VITALS
TEMPERATURE: 99.5 F | WEIGHT: 169.8 LBS | SYSTOLIC BLOOD PRESSURE: 124 MMHG | RESPIRATION RATE: 16 BRPM | HEART RATE: 100 BPM | BODY MASS INDEX: 31.65 KG/M2 | DIASTOLIC BLOOD PRESSURE: 84 MMHG

## 2018-11-26 DIAGNOSIS — Z98.1 S/P LUMBAR FUSION: Primary | ICD-10-CM

## 2018-11-26 DIAGNOSIS — G89.29 CHRONIC BILATERAL LOW BACK PAIN WITH LEFT-SIDED SCIATICA: ICD-10-CM

## 2018-11-26 DIAGNOSIS — M54.42 CHRONIC BILATERAL LOW BACK PAIN WITH LEFT-SIDED SCIATICA: ICD-10-CM

## 2018-11-26 DIAGNOSIS — Z98.1 S/P LUMBAR FUSION: ICD-10-CM

## 2018-11-26 DIAGNOSIS — M51.16 LUMBAR DISC DISEASE WITH RADICULOPATHY: ICD-10-CM

## 2018-11-26 DIAGNOSIS — G89.4 CHRONIC PAIN SYNDROME: ICD-10-CM

## 2018-11-26 DIAGNOSIS — J30.1 SEASONAL ALLERGIC RHINITIS DUE TO POLLEN: ICD-10-CM

## 2018-11-26 PROCEDURE — 99213 OFFICE O/P EST LOW 20 MIN: CPT | Mod: 24 | Performed by: PHYSICIAN ASSISTANT

## 2018-11-26 RX ORDER — LEVOCETIRIZINE DIHYDROCHLORIDE 5 MG/1
5 TABLET, FILM COATED ORAL EVERY EVENING
Qty: 90 TABLET | Refills: 3 | Status: SHIPPED | OUTPATIENT
Start: 2018-11-26 | End: 2019-08-19

## 2018-11-26 RX ORDER — OXYCODONE AND ACETAMINOPHEN 10; 325 MG/1; MG/1
1 TABLET ORAL EVERY 6 HOURS PRN
Qty: 60 TABLET | Refills: 0 | Status: SHIPPED | OUTPATIENT
Start: 2018-11-30 | End: 2018-12-13

## 2018-11-26 RX ORDER — OXYCODONE AND ACETAMINOPHEN 10; 325 MG/1; MG/1
1 TABLET ORAL EVERY 6 HOURS PRN
Qty: 60 TABLET | Refills: 0 | Status: SHIPPED | OUTPATIENT
Start: 2018-12-14 | End: 2018-11-26

## 2018-11-26 RX ORDER — OXYCODONE AND ACETAMINOPHEN 10; 325 MG/1; MG/1
1 TABLET ORAL EVERY 6 HOURS PRN
Qty: 84 TABLET | Refills: 0 | Status: CANCELLED | OUTPATIENT
Start: 2018-11-30

## 2018-11-26 ASSESSMENT — PAIN SCALES - GENERAL: PAINLEVEL: NO PAIN (0)

## 2018-11-26 NOTE — LETTER
[facility]    11/26/18    Patient: Katy Islas  YOB: 1990  Medical Record Number: [id number]                                                                  Controlled Substance Agreement  I understand that my care provider has prescribed controlled substances (narcotics, tranquilizers, and/or stimulants) to help manage my condition(s).  I am taking this medicine to help me function or work.  I know that this is strong medicine.  It could have serious side effects and even cause a dependency on the drug.  If I stop these medicines suddenly, I could have severe withdrawal symptoms.    The risks, benefits, and side effects of these medication(s) were explained to me.  I agree that:  1. I will take part in other treatments as advised by my provider.  This may be psychiatry or counseling, physical therapy, behavioral therapy, group treatment, or a referral to a pain clinic.  I will reduce or stop my medicine when my provider tells me to do so.   2. I will take my medicines as prescribed.  I will not change the dose or schedule unless my provider tells me to.  There will be no refills if I  run out early.   I may be contacted at any time without warning and asked to complete a drug test or pill count.   3. I will keep all my appointments at the clinic.  If I miss appointments or fail to follow instructions, my provider may stop my medicine.  4. I will not ask other providers to prescribe controlled substances. And I will not accept controlled substances from other people. If I need another prescribed controlled substance for a new reason, I will notify my provider within one business day.  5. If I enroll in the [place] Medical Marijuana program, I will tell my provider.  I will also sign an agreement to share my medical records with my provider.  6. I will use one pharmacy to fill all of my controlled substance prescriptions.  If my prescription is mailed to my pharmacy, it may take  5 to 7 days for my medicine to be ready.  7. I understand that my provider, clinic care team, and pharmacy can track controlled substance prescriptions from other providers through a central database (prescription monitoring program).  8. I will bring in my list of medications (or my medicine bottles) each time I come to the clinic.  [id number] REV-  07/2018                                                                                                                                   Page 1 of 2      [facility] [place]    11/26/18    Patient: Katy Islas  YOB: 1990  Medical Record Number: [id number]    9. Refills of controlled substances will be made only during office hours.  It is up to me to make sure that I do not run out of my medicines on weekends or holidays.    10. I am responsible for my prescriptions.  If the medicine/prescription is lost or stolen, it will not be replaced.   I also agree not to share these medicines with anyone.  11. I agree to not use ANY illegal or recreational drugs.  This includes marijuana, cocaine, bath salts or other drugs.  I agree not to use alcohol unless my provider says I may.  I agree to give urine samples whenever asked.  If I fail to give a urine sample, the provider may stop my medicine.     12. I will tell my nurse or provider right away if I become pregnant or have a new medical problem treated outside of [facility].  13. I understand that this medicine can affect my thinking and judgment.  It may be unsafe for me to drive, use machinery and do dangerous tasks.  I will not do any of these things until I know how the medicine affects me.  If my dose changes, I will wait to see how it affects me.  I will contact my provider if I have concerns about medicine side effects.  I understand that if I do not follow any of the conditions above, my prescriptions or treatment may be stopped.    I agree that my provider, clinic care team, and pharmacy may  work with any city, state or federal law enforcement agency that investigates the misuse, sale, or other diversion of my controlled medicine. I will allow my provider to discuss my care with or share a copy of this agreement with any other treating provider, pharmacy or emergency room where I receive care.  I agree to give up (waive) any right of privacy or confidentiality with respect to these authorizations.   I have read this agreement and have asked questions about anything I did not understand.   ___________________________________    ___________________________  Patient Signature                                                           Date and Time  ___________________________________     ____________________________  Aura Rosario PA-C  [id number] REV-  07/2018                                                                                                                                                           Page 2 of 2  Opioid Pain Medicines (also known as Narcotics)  What You Need to Know      What are opioids?   Opioids are pain medicines that must be prescribed by a doctor. Examples are:     morphine (MS Contin, Amie)    oxycodone (Oxycontin)    oxycodone and acetaminophen (Percocet)    hydrocodone and acetaminophen (Vicodin, Norco)     fentanyl patch (Duragesic)     hydromorphone (Dilaudid)     methadone     What do opioids do well?   Opioids are best for short-term pain after a surgery or injury. They also work well for cancer pain. Unlike other pain medicines, they do not cause liver or kidney failure or ulcers. They may help some people with long-lasting (chronic) pain.     What do opioids NOT do well?   Opioids never get rid of pain entirely, and they do not work well for most patients with chronic pain. Opioids do not reduce swelling, one of the causes of pain. They also don t work well for nerve pain.     Side effects  Talk to your doctor before you start or decide to keep  taking one of these medicines. Side effects include:    Lowers your breathing rate enough that it could cause death    Death due to taking more than the prescribed dose    Serious lifelong opioid use      Dependence is not the same as addiction. Addiction is when people keep using a substance that harms their body, their mind or their relations with others. If you have a history of drug or alcohol abuse, taking opioids can cause a relapse.  Over time, opioids don t work as well. Most people will need higher and higher doses. The higher the dose, the more serious the side effects. We don t know the long-term effects of opioids.   People who have used opioids for a long time have a lower quality of life, worse depression, higher levels of pain and more visits to doctors.  Overdose from prescription drugs is the second leading cause of death in the [place] The risk of overdose rises when opioids are taken with other drugs such as:    Medicines used for anxiety and panic attacks (such as lorazepam, alprazolam, clonazepam    Other sedatives    Alcohol    Illegal drugs such as heroin  Never share your opioids with others. Be sure to store opioids in a secure place, locked if possible.Young children can easily swallow them and overdose.     Are there other ways to manage pain?  Ways to help reduce pain:    Exercise every day.    Treat health problems that may be causing pain.    Treat mental health problems like depression and anxiety.     Worse depression symptoms; Less pleasure in things you usually enjoy    Feeling tired or sluggish    Slower thoughts or cloudy thinking    Being more sensitive to pain over time; Pain is harder to control.    Trouble sleeping or restless sleep    Changes in hormone levels (for example, less testosterone).     Changes in sex drive or ability to have sex    Long lasting nausea and constipation    Trouble breathing while asleep; This is worse with lung problems like COPD or sleep  apnea.    Unsafe driving    Getting sick more often    Itching    Feeling dizzy    Dry mouth    Sweating    Trouble emptying the bladder (peeing). This is worse if you have an enlarged prostate or get urinary tract infections (UTIs).    What else should I know about opioids?  When someone takes opioids for too long or too often, they become dependent. This means that if you stop or reduce the medicine too quickly, you will have withdrawal symptoms.          Practice good sleep habits.  Try to go to bed and get up at the same time every day.    Stop smoking.  Tobacco use can make pain worse.    Do things that you enjoy.    Find a way to work through pain without drugs.  Try deep breathing, meditation, visual imagery and aromatherapy.    Ask your doctor to help you create a plan to manage your pain.

## 2018-11-26 NOTE — MR AVS SNAPSHOT
After Visit Summary   11/26/2018    Katy Islas    MRN: [id number]           Patient Information     Date Of Birth          1990        Visit Information        Provider Department      11/26/2018 1:00 PM Aura Rosairo PA-C; [facility]        Today's Diagnoses     S/P lumbar fusion    -  1    Lumbar disc disease with radiculopathy        Chronic bilateral low back pain with left-sided sciatica        Chronic pain syndrome        Seasonal allergic rhinitis due to pollen          Care Instructions      - Follow up as needed                 Follow-ups after your visit        Follow-up notes from your care team     Return in about 1 month (around 12/26/2018).      Your next 10 appointments already scheduled     Nov 26, 2018  2:00 PM CST   [facility]    [address] [facility] [id number] [phone]           Please do not eat 10-12 hours before your appointment if you are coming in fasting for labs on lipids, cholesterol, or glucose (sugar). This does not apply to pregnant women. Water, hot tea and black coffee (with nothing added) are okay. Do not drink other fluids, diet soda or chew gum.              Who to contact     If you have questions or need follow up information about today's clinic visit or your schedule please contact [facility] directly at [phone].  Normal or non-critical lab and imaging results will be communicated to you by MyChart, letter or phone within 4 business days after the clinic has received the results. If you do not hear from us within 7 days, please contact the clinic through MyChart or phone. If you have a critical or abnormal lab result, we will notify you by phone as soon as possible.  Submit refill requests through [email or web address] or call your pharmacy and they will forward the refill request to us. Please allow 3 business days for  your refill to be completed.          Additional Information About Your Visit        [employer] Information     [employer] gives you secure access to your electronic health record. If you see a primary care provider, you can also send messages to your care team and make appointments. If you have questions, please call your primary care clinic.  If you do not have a primary care provider, please call [phone] and they will assist you.        Care EveryWhere ID     This is your Care EveryWhere ID. This could be used by other organizations to access your [place] medical records  [id number]        Your Vitals Were     Pulse Temperature Respirations Last Period Breastfeeding? BMI (Body Mass Index)    100 99.5  F (37.5  C) (Temporal) 16 11/23/2018 (Exact Date) No 31.65 kg/m2       Blood Pressure from Last 3 Encounters:   11/26/18 124/84   11/20/18 115/70   11/19/18 120/88    Weight from Last 3 Encounters:   11/26/18 169 lb 12.8 oz (77 kg)   11/01/18 176 lb 6.4 oz (80 kg)   10/08/18 168 lb 12.8 oz (76.6 kg)              We Performed the Following     Pain Drug Scr UR W Rptd Meds          Today's Medication Changes          These changes are accurate as of 11/26/18  1:42 PM.  If you have any questions, ask your nurse or doctor.               Start taking these medicines.        Dose/Directions    levocetirizine 5 MG tablet   Commonly known as:  XYZAL   Used for:  Seasonal allergic rhinitis due to pollen   Started by:  Aura Rosario PA-C        Dose:  5 mg   Take 1 tablet (5 mg) by mouth every evening   Quantity:  90 tablet   Refills:  3       oxyCODONE-acetaminophen  MG per tablet   Commonly known as:  PERCOCET   Used for:  S/P lumbar fusion, Lumbar disc disease with radiculopathy, Chronic bilateral low back pain with left-sided sciatica, Chronic pain syndrome   Started by:  Aura Rosario PA-C        Dose:  1 tablet   Start taking on:  12/14/2018   Take 1 tablet by mouth every 6  hours as needed for severe pain (4/day)   Quantity:  60 tablet   Refills:  0         These medicines have changed or have updated prescriptions.        Dose/Directions    pantoprazole 40 MG EC tablet   Commonly known as:  PROTONIX   This may have changed:    - when to take this  - additional instructions   Used for:  Gastroesophageal reflux disease without esophagitis        Dose:  40 mg   Take 1 tablet (40 mg) by mouth daily Take 30-60 minutes before a meal.   Quantity:  30 tablet   Refills:  3         Stop taking these medicines if you haven't already. Please contact your care team if you have questions.     LORazepam 0.5 MG tablet   Commonly known as:  ATIVAN   Stopped by:  Aura Rosario PA-C                Where to get your medicines      These medications were sent to [facility] [address] [place] [address] [address]  [address] [facility] [address]     Phone:  [phone]     levocetirizine 5 MG tablet         Some of these will need a paper prescription and others can be bought over the counter.  Ask your nurse if you have questions.     Bring a paper prescription for each of these medications     oxyCODONE-acetaminophen  MG per tablet               Information about OPIOIDS     PRESCRIPTION OPIOIDS: WHAT YOU NEED TO KNOW   We gave you an opioid (narcotic) pain medicine. It is important to manage your pain, but opioids are not always the best choice. You should first try all the other options your care team gave you. Take this medicine for as short a time (and as few doses) as possible.    Some activities can increase your pain, such as bandage changes or therapy sessions. It may help to take your pain medicine 30 to 60 minutes before these activities. Reduce your stress by getting enough sleep, working on hobbies you enjoy and practicing relaxation or meditation. Talk to your care team about ways to manage your pain  beyond prescription opioids.    These medicines have risks:    DO NOT drive when on new or higher doses of pain medicine. These medicines can affect your alertness and reaction times, and you could be arrested for driving under the influence (DUI). If you need to use opioids long-term, talk to your care team about driving.    DO NOT operate heavy machinery    DO NOT do any other dangerous activities while taking these medicines.    DO NOT drink any alcohol while taking these medicines.     If the opioid prescribed includes acetaminophen, DO NOT take with any other medicines that contain acetaminophen. Read all labels carefully. Look for the word  acetaminophen  or  Tylenol.  Ask your pharmacist if you have questions or are unsure.    You can get addicted to pain medicines, especially if you have a history of addiction (chemical, alcohol or substance dependence). Talk to your care team about ways to reduce this risk.    All opioids tend to cause constipation. Drink plenty of water and eat foods that have a lot of fiber, such as fruits, vegetables, prune juice, apple juice and high-fiber cereal. Take a laxative (Miralax, milk of magnesia, Colace, Senna) if you don t move your bowels at least every other day. Other side effects include upset stomach, sleepiness, dizziness, throwing up, tolerance (needing more of the medicine to have the same effect), physical dependence and slowed breathing.    Store your pills in a secure place, locked if possible. We will not replace any lost or stolen medicine. If you don t finish your medicine, please throw away (dispose) as directed by your pharmacist. The [place] Pollution Control Agency has more information about safe disposal: [email or web address]         Primary Care Provider Office Phone # Fax #    Aura Rosario PA-C [phone]       [address] [place] [id number]        Equal  Access to Services     [facility]: Hadii aad ku hadpetemarion Lorenaali, wasabinada luqadaha, qaybta kadarlingsusan bullock. So [facility] [phone].    ATENCIÓN: Si habla venita, tiene a kerr disposición servicios gratuitos de asistencia lingüística. Llame al [phone].    We comply with applicable federal civil rights laws and [place] laws. We do not discriminate on the basis of race, color, national origin, age, disability, sex, sexual orientation, or gender identity.            Thank you!     Thank you for choosing [facility]  for your care. Our goal is always to provide you with excellent care. Hearing back from our patients is one way we can continue to improve our services. Please take a few minutes to complete the written survey that you may receive in the mail after your visit with us. Thank you!             Your Updated Medication List - Protect others around you: Learn how to safely use, store and throw away your medicines at www.disposemymeds.org.          This list is accurate as of 11/26/18  1:42 PM.  Always use your most recent med list.                   Brand Name Dispense Instructions for use Diagnosis    acetaminophen 500 MG tablet    TYLENOL    100 tablet    Take 2 tablets (1,000 mg) by mouth 3 times daily as needed for mild pain    S/P lumbar fusion, Lumbar disc disease with radiculopathy       * albuterol 108 (90 Base) MCG/ACT inhaler    PROAIR HFA/PROVENTIL HFA/VENTOLIN HFA    3 Inhaler    Inhale 2 puffs into the lungs every 6 hours as needed for shortness of breath / dyspnea or wheezing    Mild persistent asthma without complication       * albuterol (2.5 MG/3ML) 0.083% neb solution    PROVENTIL    50 vial    Take 1 vial (2.5 mg) by nebulization every 6 hours as needed for shortness of breath / dyspnea or wheezing    Mild persistent asthma without complication       ARIPiprazole 15 MG tablet    ABILIFY    90 tablet    Take 1 tablet (15 mg) by  mouth At Bedtime    Adjustment disorder with mixed anxiety and depressed mood, Anxiety       * busPIRone 10 MG tablet    BUSPAR          * busPIRone 15 MG tablet    BUSPAR     TK 1 T PO BID        cephALEXin 500 MG capsule    KEFLEX    20 capsule    Take 1 capsule (500 mg) by mouth 2 times daily    Infected cyst of skin       cetirizine 10 MG tablet    zyrTEC    90 tablet    Take 1 tablet (10 mg) by mouth daily    Mild persistent asthma without complication       clonazePAM 0.5 MG tablet    klonoPIN     TK 1/2 TO 1 T D PRA        cyclobenzaprine 10 MG tablet    FLEXERIL    60 tablet    Take 1 tablet (10 mg) by mouth 2 times daily as needed for muscle spasms or other (back pain)    Chronic bilateral low back pain with left-sided sciatica       diazepam 10 MG tablet    VALIUM    60 tablet    Take 1 tablet (10 mg) by mouth every 12 hours as needed for anxiety or sleep (MUSCLE SPASM) (one month supply)    Chronic pain syndrome, Chronic bilateral low back pain with left-sided sciatica, Lumbar disc disease with radiculopathy, S/P lumbar fusion       dicyclomine 20 MG tablet    BENTYL    60 tablet    Take 1 tablet (20 mg) by mouth 4 times daily as needed (ABDOMINAL CRAMPING) AS NEEDED FOR CRAMPING    Hx of Crohn's disease       DULoxetine 60 MG capsule    CYMBALTA    90 capsule    Take 1 capsule (60 mg) by mouth daily    Adjustment disorder with mixed anxiety and depressed mood       EPINEPHrine 0.3 MG/0.3ML injection 2-pack    EPIPEN/ADRENACLICK/or ANY BX GENERIC EQUIV    0.6 mL    Inject 0.3 mLs (0.3 mg) into the muscle once as needed for anaphylaxis    Bee sting allergy       gabapentin 300 MG capsule    NEURONTIN    120 capsule    Take 1-3 capsules (300-900 mg) by mouth 3 times daily    Chronic bilateral low back pain with left-sided sciatica       HYDROcodone-acetaminophen 5-325 MG tablet    NORCO     Take 1 tablet every 6 hours by oral route as needed for 15 days.    Nausea       levocetirizine 5 MG tablet    XYZAL     90 tablet    Take 1 tablet (5 mg) by mouth every evening    Seasonal allergic rhinitis due to pollen       medical cannabis inhalation (Patient's own supply.  Not a prescription)      Inhale into the lungs 3 times daily as needed Reported on 3/28/2017        medroxyPROGESTERone 150 MG/ML injection    DEPO-PROVERA    3 mL    Inject 1 mL (150 mg) into the muscle every 3 months    Encounter for initial prescription of injectable contraceptive       methylPREDNISolone 4 MG tablet    MEDROL DOSEPAK    21 tablet    Follow package instructions    S/P lumbar fusion       NONFORMULARY      Apply 30 mLs topically 4 times daily        nystatin cream    MYCOSTATIN     nystatin 100,000 unit/gram topical cream    Nausea       ondansetron 4 MG ODT tab    ZOFRAN ODT    30 tablet    Take 1 tablet (4 mg) by mouth every 6 hours as needed for nausea    Migraine with aura and without status migrainosus, not intractable       ondansetron 4 MG tablet    ZOFRAN    30 tablet    Take 1-2 tablets (4-8 mg) by mouth every 6 hours as needed for nausea    Migraine with aura and without status migrainosus, not intractable       oxyCODONE IR 10 MG tablet    ROXICODONE    112 tablet    Take 1-2 tablets (10-20 mg) by mouth every 4 hours as needed for severe pain (Max 8/day)    S/P lumbar fusion       oxyCODONE-acetaminophen  MG per tablet   Start taking on:  12/14/2018    PERCOCET    60 tablet    Take 1 tablet by mouth every 6 hours as needed for severe pain (4/day)    S/P lumbar fusion, Lumbar disc disease with radiculopathy, Chronic bilateral low back pain with left-sided sciatica, Chronic pain syndrome       pantoprazole 40 MG EC tablet    PROTONIX    30 tablet    Take 1 tablet (40 mg) by mouth daily Take 30-60 minutes before a meal.    Gastroesophageal reflux disease without esophagitis       polyethylene glycol powder    MIRALAX    510 g    Take 17 g (1 capful) by mouth daily    Internal hemorrhoid, External hemorrhoids       prazosin 1  MG capsule    MINIPRESS     Take 1 mg by mouth At Bedtime        promethazine 6.25 MG/5ML syrup    PHENERGAN    560 mL    Take 10 mLs (12.5 mg) by mouth 4 times daily as needed for nausea    Nausea       * rizatriptan 5 MG ODT tab    MAXALT-MLT    18 tablet    Take 1-2 tablets (5-10 mg) by mouth at onset of headache for migraine May repeat in 2 hours. Max 6 tablets/24 hours.    Migraine with aura and without status migrainosus, not intractable       * rizatriptan 5 MG tablet    MAXALT    18 tablet    Take 1-2 tablets (5-10 mg) by mouth at onset of headache for migraine May repeat in 2 hours. Max 6 tablets/24 hours.    Migraine with aura and without status migrainosus, not intractable       sucralfate 1 GM tablet    CARAFATE    60 tablet    Take 1 tablet (1 g) by mouth 4 times daily as needed    Gastroesophageal reflux disease without esophagitis       varenicline 0.5 MG X 11 & 1 MG X 42 tablet    CHANTIX STARTING MONTH ILAN    53 tablet    Take 0.5 mg tab daily for 3 days, then 0.5 mg tab twice daily for 4 days, then 1 mg twice daily.    Tobacco use disorder       VIRT-PHOS 250 NEUTRAL [phone] MG Tabs      TK 1 T PO BID FOR 15 DAYS        * Notice:  This list has 6 medication(s) that are the same as other medications prescribed for you. Read the directions carefully, and ask your doctor or other care provider to review them with you.

## 2018-11-28 ENCOUNTER — MYC MEDICAL ADVICE (OUTPATIENT)
Dept: FAMILY MEDICINE | Facility: OTHER | Age: 28
End: 2018-11-28

## 2018-11-30 ENCOUNTER — MYC MEDICAL ADVICE (OUTPATIENT)
Dept: FAMILY MEDICINE | Facility: OTHER | Age: 28
End: 2018-11-30

## 2018-11-30 LAB — PAIN DRUG SCR UR W RPTD MEDS: NORMAL

## 2018-11-30 NOTE — PROGRESS NOTES
Savana Burns    Your results were normal/as expected.     The results are attached for your review.       Zach Rosario PA-C

## 2018-12-07 NOTE — PROGRESS NOTES
SUBJECTIVE:   Katy Islas is a 28 year old female who presents to clinic today for the following health issues:    History of Present Illness     Diet:  Regular (no restrictions)  Frequency of exercise:  2-3 days/week  Duration of exercise:  30-45 minutes  Taking medications regularly:  Not Applicable  Medication side effects:  Other  Additional concerns today:  Yes    Back Pain Follow Up      Description:   Location of pain:  bilateral  Character of pain: dull ache  Pain radiation: radiates into the right buttocks and radiates into the left buttocks  Since last visit, pain is:  Worsened: form physical therapy, no .    New numbness or weakness in legs, not attributed to pain:  no     Intensity: Currently 5/10    History:   Pain interferes with job: Not applicable  Therapies tried without relief:   Therapies tried with relief: medical marijuana, cold, heat, opioids and Physical Therapy     - States given #60 at 4 per day only lasts 2 weeks  - [facility] for physical therapy twice a week, pool therapy, they refuse to get her an in person interpretor, wondering about options for this     - Needs her Epi-Pen refilled       Medication Followup of Depo     Taking Medication as prescribed: yes    Side Effects:  None    Medication Helping Symptoms:  yes               Plan from last visit 11/26/18  - Patient s/p SI joint fusion 10/26/18, also coccyx resection (10/18/17 with subsequent wound infection requiring wound vac) and prior lumbar fusion (L4-5 on 2/23/17)  - As previous note, letter patient got from surgeon reviewed (sent to scanning) and my discussion with his nurse is that they will no longer prescribe since patient was asking for too much  - Self tapered from 6/day to 4/day, trying to take as needed   - Next script due 11/30/18, dated as such      Oxycodone-APAP  mg, max 4/day      Total daily dose - Oxycodone 40 mg MME 60, APAP 1,300 mg       Instructed to keep tapering as  tolerated, 3/day for a few days, then 2/day, etc.      Will need appointment for further refills   -  reviewed  - Since has been chronic issue with patient, recommend CSA, discussed and signed today 11/26/18  - Will also update [facility] today (11/26/18)   - Will also be starting physical therapy at [facility] [place] today          Problem list and histories reviewed & adjusted, as indicated.  Additional history: as documented      ROS:  Constitutional, HEENT, cardiovascular, pulmonary, gi and gu systems are negative, except as otherwise noted.    OBJECTIVE:   /68   Pulse 108   Temp 98.1  F (36.7  C) (Temporal)   Resp 18   Wt 76.7 kg (169 lb 3.2 oz)   LMP 12/12/2018 (Approximate)   SpO2 98%   Breastfeeding? No   BMI 31.53 kg/m    Body mass index is 31.53 kg/m .  GENERAL APPEARANCE: healthy, alert and no distress  EYES: Eyes grossly normal to inspection, PERRLA, conjunctivae and sclerae without injection or discharge, EOM intact   MS: No musculoskeletal defects are noted and gait is age appropriate without ataxia   SKIN: No suspicious lesions or rashes, hydration status appears adeuqate with normal skin turgor   PSYCH: Alert and oriented x3; speech- coherent , normal rate and volume; able to articulate logical thoughts, able to abstract reason, no tangential thoughts, no hallucinations or delusions, mentation appears normal, Mood is euthymic. Affect is appropriate for this mood state and bright. Thought content is free of suicidal ideation, hallucinations, and delusions. Dress is adequate and upkept. Eye contact is good during conversation.       Diagnostic Test Results:  Results for orders placed or performed in visit on 12/13/18 (from the past 24 hour(s))   Beta HCG Qual, Urine - [facility] ([id number])   Result Value Ref Range    Beta HCG Qual IFA Urine Negative NEG^Negative        *Note: Due to a large number of results and/or encounters for the requested time period, some results have not been displayed. A  complete set of results can be found in Results Review.         ASSESSMENT/PLAN:       ICD-10-CM    1. Chronic pain syndrome G89.4 oxyCODONE-acetaminophen (PERCOCET)  MG per tablet     [facility] PT, HAND, AND CHIROPRACTIC REFERRAL     diazepam (VALIUM) 10 MG tablet   2. S/P lumbar fusion Z98.1 oxyCODONE-acetaminophen (PERCOCET)  MG per tablet     [facility] PT, HAND, AND CHIROPRACTIC REFERRAL     diazepam (VALIUM) 10 MG tablet   3. Lumbar disc disease with radiculopathy M51.16 oxyCODONE-acetaminophen (PERCOCET)  MG per tablet     [facility] PT, HAND, AND CHIROPRACTIC REFERRAL     diazepam (VALIUM) 10 MG tablet   4. Chronic bilateral low back pain with left-sided sciatica M54.42 oxyCODONE-acetaminophen (PERCOCET)  MG per tablet    G89.29 diazepam (VALIUM) 10 MG tablet     cyclobenzaprine (FLEXERIL) 10 MG tablet   5. Encounter for initial prescription of injectable contraceptive Z30.013 Beta HCG Qual, Urine - [facility] ([id number])     medroxyPROGESTERone (DEPO-PROVERA) 150 MG/ML IM injection     C Medroxyprogesterone inj/1mg     INJECTION INTRAMUSCULAR OR SUB-Q   6. Bee sting allergy Z91.030 EPINEPHrine (EPIPEN/ADRENACLICK/OR ANY BX GENERIC EQUIV) 0.3 MG/0.3ML injection 2-pack     1-4.   - Patient s/p SI joint fusion 10/26/18, also coccyx resection (10/18/17 with subsequent wound infection requiring wound vac) and prior lumbar fusion (L4-5 on 2/23/17)  - As previous note, letter patient got from surgeon reviewed (sent to scanning) and my discussion with his nurse is that they will no longer prescribe since patient was asking for too much  - Self tapered from 6/day to 4/day, trying to take as needed    - Last RX given 11/30/18 for Percocet  mg, taking 4 per day      Discussed that per our last visit, she was not to take 4/day but was to use as needed and taper, #60 was expected to be last prescription      Per my last note       Instructed to keep tapering as tolerated, 3/day for a few days, then  2/day, etc.      Will need appointment for further refills   -  reviewed    - Discussed I will give her one FINAL prescription of Percocet #75, she is to use as sparingly      Reviewed use and side effects       No further prescriptions       Can not have NSAIDs due to GI bleeds, so should rely on Tylenol and her Flexeril, reviewed proper use of each     - Referral placed to  physical therapy, discussed I do not know if there is pool therapy but she can ask when  calls       5. Depo   - Patient likes being on this, feels helps with her Crohn's to not have periods   - Patient outside of window for injection  - Beta HCG negative   - Reviewed use and side effects, refilled for 1 year     6. Refilled Epi-pen, reviewed use and side effects     The patient indicates understanding of these issues and agrees with the plan.    Follow up: Yearly or PRN     Due to language barrier, an  was present during the history-taking and subsequent discussion (and for part of the physical exam) with this patient.        Aura Rosario PA-C  [facility]

## 2018-12-09 ENCOUNTER — HOSPITAL ENCOUNTER (EMERGENCY)
Facility: CLINIC | Age: 28
Discharge: HOME | End: 2018-12-09
Attending: NURSE PRACTITIONER | Admitting: NURSE PRACTITIONER
Payer: MEDICARE

## 2018-12-09 ENCOUNTER — APPOINTMENT (OUTPATIENT)
Dept: CT IMAGING | Facility: CLINIC | Age: 28
End: 2018-12-09
Attending: NURSE PRACTITIONER
Payer: MEDICARE

## 2018-12-09 VITALS
OXYGEN SATURATION: 99 % | HEART RATE: 85 BPM | SYSTOLIC BLOOD PRESSURE: 135 MMHG | BODY MASS INDEX: 31.87 KG/M2 | RESPIRATION RATE: 20 BRPM | TEMPERATURE: 98.7 F | WEIGHT: 171 LBS | DIASTOLIC BLOOD PRESSURE: 80 MMHG

## 2018-12-09 DIAGNOSIS — Z76.5 DRUG-SEEKING BEHAVIOR: ICD-10-CM

## 2018-12-09 DIAGNOSIS — R10.84 ABDOMINAL PAIN, GENERALIZED: ICD-10-CM

## 2018-12-09 DIAGNOSIS — R11.2 NON-INTRACTABLE VOMITING WITH NAUSEA, UNSPECIFIED VOMITING TYPE: ICD-10-CM

## 2018-12-09 LAB
ALBUMIN SERPL-MCNC: 3.3 G/DL (ref 3.4–5)
ALBUMIN UR-MCNC: NEGATIVE MG/DL
ALP SERPL-CCNC: 192 U/L (ref 40–150)
ALT SERPL W P-5'-P-CCNC: 78 U/L (ref 0–50)
ANION GAP SERPL CALCULATED.3IONS-SCNC: 9 MMOL/L (ref 3–14)
APPEARANCE UR: CLEAR
AST SERPL W P-5'-P-CCNC: 19 U/L (ref 0–45)
BASOPHILS # BLD AUTO: 0 10E9/L (ref 0–0.2)
BASOPHILS NFR BLD AUTO: 0.2 %
BILIRUB SERPL-MCNC: 0.4 MG/DL (ref 0.2–1.3)
BILIRUB UR QL STRIP: NEGATIVE
BUN SERPL-MCNC: 10 MG/DL (ref 7–30)
CALCIUM SERPL-MCNC: 8.9 MG/DL (ref 8.5–10.1)
CHLORIDE SERPL-SCNC: 106 MMOL/L (ref 94–109)
CO2 SERPL-SCNC: 26 MMOL/L (ref 20–32)
COLOR UR AUTO: YELLOW
CREAT SERPL-MCNC: 0.72 MG/DL (ref 0.52–1.04)
CRP SERPL-MCNC: 4.5 MG/L (ref 0–8)
DIFFERENTIAL METHOD BLD: NORMAL
EOSINOPHIL NFR BLD AUTO: 0.5 %
ERYTHROCYTE [DISTWIDTH] IN BLOOD BY AUTOMATED COUNT: 12.1 % (ref 10–15)
GFR SERPL CREATININE-BSD FRML MDRD: >90 ML/MIN/1.7M2
GLUCOSE SERPL-MCNC: 101 MG/DL (ref 70–99)
GLUCOSE UR STRIP-MCNC: NEGATIVE MG/DL
HCT VFR BLD AUTO: 39.8 % (ref 35–47)
HGB BLD-MCNC: 13.6 G/DL (ref 11.7–15.7)
HGB UR QL STRIP: NEGATIVE
IMM GRANULOCYTES # BLD: 0 10E9/L (ref 0–0.4)
IMM GRANULOCYTES NFR BLD: 0.2 %
KETONES UR STRIP-MCNC: NEGATIVE MG/DL
LACTATE BLD-SCNC: 1.6 MMOL/L (ref 0.7–2)
LEUKOCYTE ESTERASE UR QL STRIP: NEGATIVE
LIPASE SERPL-CCNC: 69 U/L (ref 73–393)
LYMPHOCYTES # BLD AUTO: 1.2 10E9/L (ref 0.8–5.3)
LYMPHOCYTES NFR BLD AUTO: 20 %
MCH RBC QN AUTO: 32.2 PG (ref 26.5–33)
MCHC RBC AUTO-ENTMCNC: 34.2 G/DL (ref 31.5–36.5)
MCV RBC AUTO: 94 FL (ref 78–100)
MONOCYTES # BLD AUTO: 0.6 10E9/L (ref 0–1.3)
MONOCYTES NFR BLD AUTO: 9.6 %
MUCOUS THREADS #/AREA URNS LPF: PRESENT /LPF
NEUTROPHILS # BLD AUTO: 4 10E9/L (ref 1.6–8.3)
NEUTROPHILS NFR BLD AUTO: 69.5 %
NITRATE UR QL: NEGATIVE
NRBC # BLD AUTO: 0 10*3/UL
NRBC BLD AUTO-RTO: 0 /100
PH UR STRIP: 7 PH (ref 5–7)
PLATELET # BLD AUTO: 318 10E9/L (ref 150–450)
POTASSIUM SERPL-SCNC: 4.3 MMOL/L (ref 3.4–5.3)
PROT SERPL-MCNC: 7.5 G/DL (ref 6.8–8.8)
RBC # BLD AUTO: 4.23 10E12/L (ref 3.8–5.2)
RBC #/AREA URNS AUTO: <1 /HPF (ref 0–2)
SODIUM SERPL-SCNC: 141 MMOL/L (ref 133–144)
SOURCE: ABNORMAL
SP GR UR STRIP: 1.02 (ref 1–1.03)
SQUAMOUS #/AREA URNS AUTO: 5 /HPF (ref 0–1)
UROBILINOGEN UR STRIP-MCNC: 0 MG/DL (ref 0–2)
WBC # BLD AUTO: 5.7 10E9/L (ref 4–11)
WBC #/AREA URNS AUTO: 1 /HPF (ref 0–5)

## 2018-12-09 PROCEDURE — 85025 COMPLETE CBC W/AUTO DIFF WBC: CPT | Performed by: NURSE PRACTITIONER

## 2018-12-09 PROCEDURE — 99285 EMERGENCY DEPT VISIT HI MDM: CPT | Mod: 25 | Performed by: NURSE PRACTITIONER

## 2018-12-09 PROCEDURE — 83690 ASSAY OF LIPASE: CPT | Performed by: NURSE PRACTITIONER

## 2018-12-09 PROCEDURE — 86140 C-REACTIVE PROTEIN: CPT | Performed by: NURSE PRACTITIONER

## 2018-12-09 PROCEDURE — 96375 TX/PRO/DX INJ NEW DRUG ADDON: CPT | Performed by: NURSE PRACTITIONER

## 2018-12-09 PROCEDURE — 96361 HYDRATE IV INFUSION ADD-ON: CPT | Performed by: NURSE PRACTITIONER

## 2018-12-09 PROCEDURE — 99285 EMERGENCY DEPT VISIT HI MDM: CPT | Mod: Z6 | Performed by: NURSE PRACTITIONER

## 2018-12-09 PROCEDURE — 74176 CT ABD & PELVIS W/O CONTRAST: CPT

## 2018-12-09 PROCEDURE — 25000128 H RX IP 250 OP 636: Performed by: NURSE PRACTITIONER

## 2018-12-09 PROCEDURE — 80053 COMPREHEN METABOLIC PANEL: CPT | Performed by: NURSE PRACTITIONER

## 2018-12-09 PROCEDURE — 96376 TX/PRO/DX INJ SAME DRUG ADON: CPT | Performed by: NURSE PRACTITIONER

## 2018-12-09 PROCEDURE — 81001 URINALYSIS AUTO W/SCOPE: CPT | Performed by: NURSE PRACTITIONER

## 2018-12-09 PROCEDURE — 83605 ASSAY OF LACTIC ACID: CPT | Performed by: NURSE PRACTITIONER

## 2018-12-09 PROCEDURE — 96374 THER/PROPH/DIAG INJ IV PUSH: CPT | Performed by: NURSE PRACTITIONER

## 2018-12-09 RX ORDER — FENTANYL CITRATE 50 UG/ML
50 INJECTION, SOLUTION INTRAMUSCULAR; INTRAVENOUS ONCE
Status: COMPLETED | OUTPATIENT
Start: 2018-12-09 | End: 2018-12-09

## 2018-12-09 RX ORDER — HYDROMORPHONE HYDROCHLORIDE 1 MG/ML
0.5 INJECTION, SOLUTION INTRAMUSCULAR; INTRAVENOUS; SUBCUTANEOUS
Status: COMPLETED | OUTPATIENT
Start: 2018-12-09 | End: 2018-12-09

## 2018-12-09 RX ORDER — SODIUM CHLORIDE 9 MG/ML
1000 INJECTION, SOLUTION INTRAVENOUS CONTINUOUS
Status: DISCONTINUED | OUTPATIENT
Start: 2018-12-09 | End: 2018-12-10 | Stop reason: HOSPADM

## 2018-12-09 RX ORDER — DIPHENHYDRAMINE HYDROCHLORIDE 50 MG/ML
25 INJECTION INTRAMUSCULAR; INTRAVENOUS ONCE
Status: COMPLETED | OUTPATIENT
Start: 2018-12-09 | End: 2018-12-09

## 2018-12-09 RX ORDER — PROMETHAZINE HYDROCHLORIDE 25 MG/ML
25 INJECTION, SOLUTION INTRAMUSCULAR; INTRAVENOUS ONCE
Status: COMPLETED | OUTPATIENT
Start: 2018-12-09 | End: 2018-12-09

## 2018-12-09 RX ORDER — ONDANSETRON 2 MG/ML
4 INJECTION INTRAMUSCULAR; INTRAVENOUS EVERY 30 MIN PRN
Status: COMPLETED | OUTPATIENT
Start: 2018-12-09 | End: 2018-12-09

## 2018-12-09 RX ADMIN — FENTANYL CITRATE 50 MCG: 50 INJECTION INTRAMUSCULAR; INTRAVENOUS at 21:29

## 2018-12-09 RX ADMIN — SODIUM CHLORIDE 1000 ML: 9 INJECTION, SOLUTION INTRAVENOUS at 17:41

## 2018-12-09 RX ADMIN — Medication 0.5 MG: at 17:45

## 2018-12-09 RX ADMIN — DIPHENHYDRAMINE HYDROCHLORIDE 25 MG: 50 INJECTION, SOLUTION INTRAMUSCULAR; INTRAVENOUS at 21:29

## 2018-12-09 RX ADMIN — Medication 0.5 MG: at 18:12

## 2018-12-09 RX ADMIN — PROMETHAZINE HYDROCHLORIDE 25 MG: 25 INJECTION INTRAMUSCULAR; INTRAVENOUS at 18:07

## 2018-12-09 RX ADMIN — FENTANYL CITRATE 50 MCG: 50 INJECTION INTRAMUSCULAR; INTRAVENOUS at 20:00

## 2018-12-09 RX ADMIN — ONDANSETRON 4 MG: 2 INJECTION INTRAMUSCULAR; INTRAVENOUS at 17:44

## 2018-12-09 RX ADMIN — Medication 0.5 MG: at 18:46

## 2018-12-09 RX ADMIN — ONDANSETRON 4 MG: 2 INJECTION INTRAMUSCULAR; INTRAVENOUS at 21:02

## 2018-12-09 RX ADMIN — DIPHENHYDRAMINE HYDROCHLORIDE 25 MG: 50 INJECTION, SOLUTION INTRAMUSCULAR; INTRAVENOUS at 18:44

## 2018-12-09 RX ADMIN — ONDANSETRON 4 MG: 2 INJECTION INTRAMUSCULAR; INTRAVENOUS at 19:50

## 2018-12-09 ASSESSMENT — ENCOUNTER SYMPTOMS
APPETITE CHANGE: 1
VOMITING: 1
ACTIVITY CHANGE: 1
NAUSEA: 1
FATIGUE: 1
ABDOMINAL PAIN: 1

## 2018-12-09 NOTE — ED AVS SNAPSHOT
[facility] Emergency Department  [address] [place] [address]  Phone:  [phone]  Fax:  [phone]                                    Katy Islas   MRN: [id number]    Department:  [facility] Emergency Department   Date of Visit:  12/9/2018           After Visit Summary Signature Page    I have received my discharge instructions, and my questions have been answered. I have discussed any challenges I see with this plan with the nurse or doctor.    ..........................................................................................................................................  Patient/Patient Representative Signature      ..........................................................................................................................................  Patient Representative Print Name and Relationship to Patient    ..................................................               ................................................  Date                                   Time    ..........................................................................................................................................  Reviewed by Signature/Title    ...................................................              ..............................................  Date                                               Time          22EPIC Rev 08/18

## 2018-12-09 NOTE — ED TRIAGE NOTES
Pt here with abdominal pain and nausea and vomiting. Cannot keep her pain meds or Zofran down. She reports she has been sick x 1 week. Reports blood in the emesis. Tachycardic.

## 2018-12-09 NOTE — ED PROVIDER NOTES
History     Chief Complaint   Patient presents with     Abdominal Pain     Katy is a 28 year old female, presents to the ED today with c/o one week hx of abdominal pain, vomiting and just today some hematemesis with small amounts of BRB.  Patient has been unable to keep fluids and Zofran down at home.  She does have a hx of Chron's thinks that this is what is causing her symptoms.  She is stooling and urinating normally, uncertain if she has had a fever. Patient was on a medrol dose back last month for her back.  Patient was brought here with her mom.        The history is provided by the patient.         Problem List:    Patient Active Problem List    Diagnosis Date Noted     Hypophosphatemia 11/01/2018     Priority: Medium     Patellar maltracking, right 09/05/2018     Priority: Medium     Right anterior knee pain 09/05/2018     Priority: Medium     Melanocytic nevus, unspecified location 07/23/2018     Priority: Medium     Dysplastic skin lesion 07/23/2018     Priority: Medium     Iliotibial band syndrome affecting lower leg, right 12/21/2017     Priority: Medium     Chondromalacia of right patellofemoral joint 12/21/2017     Priority: Medium     Postoperative pain 11/13/2017     Priority: Medium     Closed fracture of coccyx with nonunion, subsequent encounter 10/12/2017     Priority: Medium     Upper GI bleed - suspected 07/01/2017     Priority: Medium     Tobacco use disorder 07/01/2017     Priority: Medium     History of pseudoseizure 07/01/2017     Priority: Medium     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) 03/10/2017     Priority: Medium     Lumbar disc disease with radiculopathy 02/23/2017     Priority: Medium     S/P lumbar fusion 02/23/2017     Priority: Medium     Dr. YOVANI Millan, 2/23/17       Vitamin D deficiency 01/06/2017     Priority: Medium     Atypical mole 01/06/2017     Priority: Medium     Mild persistent asthma without complication 12/02/2016     Priority: Medium     Chronic  bilateral low back pain with left-sided sciatica 12/02/2016     Priority: Medium     Bee sting allergy 09/16/2016     Priority: Medium     Gastroesophageal reflux disease without esophagitis 08/26/2016     Priority: Medium     Herpes simplex virus infection 11/12/2015     Priority: Medium     Adjustment disorder with mixed anxiety and depressed mood 10/14/2015     Priority: Medium     Marijuana abuse 02/22/2015     Priority: Medium     Grand mal seizure disorder (H) 10/08/2013     Priority: Medium     Bipolar disorder (H) 01/31/2013     Priority: Medium     Problem list name updated by automated process. Provider to review       Nausea and vomiting 12/13/2012     Priority: Medium     Oral thrush 06/25/2012     Priority: Medium     Chronic pain 02/09/2012     Priority: Medium     Patient is followed by Aura Rosario PA-C for ongoing prescription of pain medication.  All refills should only be approved by this provider, or covering partner.    Medication(s): Norco    Maximum quantity per month: 70  Clinic visit frequency required: Q 2 months     Controlled substance agreement:   Discussed and signed 4/25/17    Encounter-Level CSA - 01/12/2015:                 Controlled Substance Agreement - Scan on 1/26/2015  9:14 AM : Controlled Medication Agreement-01/12/15 (below)            Pain Clinic evaluation in the past: Yes       Date/Location:   [facility], fall 2016    DIRE Total Score(s):    4/25/2017   Total Score 17       Last [facility] website verification:  done on 4/25/17 by LOVE Maki   [email or web address]           Anxiety 09/22/2011     Priority: Medium     Mild major depression (H) 08/29/2011     Priority: Medium     Crohn's colitis (H) 08/04/2009     Priority: Medium     Dysmenorrhea 05/11/2007     Priority: Medium     Benign neoplasm of skin of lower limb, including hip 03/16/2004     Priority: Medium     Migraine      Priority: Medium     Dr. Farrar - Neurology.  Now on  Inderal.  Seems to be working.  Follow-up 9/08  Problem list name updated by automated process. Provider to review       Hearing loss      Priority: Medium     Congenital  Problem list name updated by automated process. Provider to review       Allergic rhinitis      Priority: Medium     Problem list name updated by automated process. Provider to review          Past Medical History:    Past Medical History:   Diagnosis Date     Abdominal pain 10/31- 11/4/2005     Allergic rhinitis, cause unspecified      Arthritis      C. 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.     FUSION SPINE POSTERIOR MINIMALLY INVASIVE ONE LEVEL N/A 2/23/2017    Procedure: FUSION SPINE POSTERIOR MINIMALLY INVASIVE ONE LEVEL;  L4-5 Oblique Lateral Lumbar Interbody Fusion   Epidural steroid injection.   Transpedicular Bone marrow aspiration;  Surgeon: Jeniffer Eugene MD;  Location: [facility] OR     HC COLONOSCOPY THRU STOMA WITH BIOPSY  10/29/2003    Impression is that of normal appearing colonoscopy, without evidence of rectal bleeding.     HC COLONOSCOPY THRU STOMA, DIAGNOSTIC  10/00    normal     HC COLONOSCOPY THRU STOMA, DIAGNOSTIC  Oct 2009    Dr López- normal     HC EEG AWAKE AND SLEEP      abnormal     HC  MRI BRAIN W/O CONTRAST  12/00    normal     HC REMOVAL GALLBLADDER  8/5/2009    [facility] [place]     HC UGI ENDOSCOPY DIAG W BIOPSY  11/11/09    Normal esophagus     HC UGI ENDOSCOPY, SIMPLE EXAM  7/00, 10/00    mild chronic esophagitis and duodenitis, neg H pylori     HC UGI ENDOSCOPY, SIMPLE EXAM  01/20/2005    Esophagogastroduodenoscopy, colonoscopy with biopsies.  [facility]     HC UGI ENDOSCOPY, SIMPLE EXAM  7/1/2009    [facility] [place].     HC UGI ENDOSCOPY, SIMPLE EXAM  11/11/2009    attempted upper GI, pt. could not tolerate procedure:[facility]     ORTHOPEDIC SURGERY  October 19,2011    diskectomy L4-L5       Family History:    Family History   Problem Relation Age of Onset     Gastrointestinal Disease Brother         severe Crohn's     Neurologic Disorder Brother         Seizures post head injury     Depression Brother      Substance Abuse Brother      Genitourinary Problems Father         kidney stones     Diabetes Father      Heart Disease Father         Open heart surgery     Breast Cancer Maternal Grandmother      Parkinsonism Maternal Grandmother      Cerebrovascular Disease Paternal Grandmother      Cancer Maternal Grandfather         Lung     Cardiovascular Paternal Grandfather         Heart Attack     Substance Abuse Mother      Lung Cancer Paternal Uncle      Cancer Paternal Uncle      Lung Cancer Maternal Uncle        Social History:  Marital Status:  Single [1]  Social History     Tobacco Use     Smoking status: Former Smoker     Packs/day: 0.50     Types: Cigarettes     Smokeless tobacco: Never Used     Tobacco comment: quit July 2018 - 4 days no cig   Substance Use Topics     Alcohol use: Yes     Alcohol/week: 0.6 oz     Types: 1 Cans of beer per week     Comment: once in while      Drug use: Yes     Types: Marijuana     Comment: Medical Marijuana currently        Medications:      acetaminophen (TYLENOL) 500 MG tablet   albuterol  (2.5 MG/3ML) 0.083% neb solution   albuterol (PROAIR HFA/PROVENTIL HFA/VENTOLIN HFA) 108 (90 Base) MCG/ACT Inhaler   ARIPiprazole (ABILIFY) 15 MG tablet   busPIRone (BUSPAR) 10 MG tablet   busPIRone (BUSPAR) 15 MG tablet   cephALEXin (KEFLEX) 500 MG capsule   cetirizine (ZYRTEC) 10 MG tablet   clonazePAM (KLONOPIN) 0.5 MG tablet   cyclobenzaprine (FLEXERIL) 10 MG tablet   diazepam (VALIUM) 10 MG tablet   dicyclomine (BENTYL) 20 MG tablet   DULoxetine (CYMBALTA) 60 MG EC capsule   EPINEPHrine (EPIPEN/ADRENACLICK/OR ANY BX GENERIC EQUIV) 0.3 MG/0.3ML injection 2-pack   gabapentin (NEURONTIN) 300 MG capsule   HYDROcodone-acetaminophen (NORCO) 5-325 MG per tablet   K Phos Mono-Sod Phos Di & Mono (VIRT-PHOS 250 NEUTRAL) 155-852-130 MG TABS   levocetirizine (XYZAL) 5 MG tablet   medical cannabis inhalation (Patient's own supply.  Not a prescription)   medroxyPROGESTERone (DEPO-PROVERA) 150 MG/ML injection   methylPREDNISolone (MEDROL DOSEPAK) 4 MG tablet   NONFORMULARY   nystatin (MYCOSTATIN) cream   ondansetron (ZOFRAN ODT) 4 MG ODT tab   ondansetron (ZOFRAN) 4 MG tablet   oxyCODONE IR (ROXICODONE) 10 MG tablet   oxyCODONE-acetaminophen (PERCOCET)  MG per tablet   pantoprazole (PROTONIX) 40 MG EC tablet   polyethylene glycol (MIRALAX) powder   prazosin (MINIPRESS) 1 MG capsule   promethazine (PHENERGAN) 6.25 MG/5ML syrup   rizatriptan (MAXALT) 5 MG tablet   rizatriptan (MAXALT-MLT) 5 MG ODT tab   sucralfate (CARAFATE) 1 GM tablet   varenicline (CHANTIX STARTING MONTH ILAN) 0.5 MG X 11 & 1 MG X 42 tablet         Review of Systems   Constitutional: Positive for activity change, appetite change and fatigue.   Gastrointestinal: Positive for abdominal pain, nausea and vomiting.   All other systems reviewed and are negative.      Physical Exam   BP: (!) 147/97  Pulse: 140  Temp: 98.7  F (37.1  C)  Resp: 20  Weight: 77.6 kg (171 lb)  SpO2: 100 %      Physical Exam   Constitutional: She is oriented to person, place, and  time. She appears well-developed and well-nourished. No distress.   HENT:   Head: Normocephalic.   Eyes: EOM are normal. Pupils are equal, round, and reactive to light.   Neck: Normal range of motion. Neck supple.   Cardiovascular: Regular rhythm and intact distal pulses.   No murmur heard.  Heart rate tachycardic at 140   Pulmonary/Chest: Effort normal and breath sounds normal.   Abdominal: Soft. Bowel sounds are normal. She exhibits no distension and no mass. There is tenderness (generalized, mild). There is no rebound and no guarding.   Musculoskeletal: Normal range of motion.   Neurological: She is alert and oriented to person, place, and time.   Skin: Skin is warm and dry. Capillary refill takes less than 2 seconds. She is not diaphoretic.   Psychiatric: She has a normal mood and affect.       ED Course        Procedures          Results for orders placed or performed during the hospital encounter of 12/09/18 (from the past 24 hour(s))   CBC with platelets differential   Result Value Ref Range    WBC 5.7 4.0 - 11.0 10e9/L    RBC Count 4.23 3.8 - 5.2 10e12/L    Hemoglobin 13.6 11.7 - 15.7 g/dL    Hematocrit 39.8 35.0 - 47.0 %    MCV 94 78 - 100 fl    MCH 32.2 26.5 - 33.0 pg    MCHC 34.2 31.5 - 36.5 g/dL    RDW 12.1 10.0 - 15.0 %    Platelet Count 318 150 - 450 10e9/L    Diff Method Automated Method     % Neutrophils 69.5 %    % Lymphocytes 20.0 %    % Monocytes 9.6 %    % Eosinophils 0.5 %    % Basophils 0.2 %    % Immature Granulocytes 0.2 %    Nucleated RBCs 0 0 /100    Absolute Neutrophil 4.0 1.6 - 8.3 10e9/L    Absolute Lymphocytes 1.2 0.8 - 5.3 10e9/L    Absolute Monocytes 0.6 0.0 - 1.3 10e9/L    Absolute Basophils 0.0 0.0 - 0.2 10e9/L    Abs Immature Granulocytes 0.0 0 - 0.4 10e9/L    Absolute Nucleated RBC 0.0    Comprehensive metabolic panel   Result Value Ref Range    Sodium 141 133 - 144 mmol/L    Potassium 4.3 3.4 - 5.3 mmol/L    Chloride 106 94 - 109 mmol/L    Carbon Dioxide 26 20 - 32 mmol/L     Anion Gap 9 3 - 14 mmol/L    Glucose 101 (H) 70 - 99 mg/dL    Urea Nitrogen 10 7 - 30 mg/dL    Creatinine 0.72 0.52 - 1.04 mg/dL    GFR Estimate >90 >60 mL/min/1.7m2    GFR Estimate If Black >90 >60 mL/min/1.7m2    Calcium 8.9 8.5 - 10.1 mg/dL    Bilirubin Total 0.4 0.2 - 1.3 mg/dL    Albumin 3.3 (L) 3.4 - 5.0 g/dL    Protein Total 7.5 6.8 - 8.8 g/dL    Alkaline Phosphatase 192 (H) 40 - 150 U/L    ALT 78 (H) 0 - 50 U/L    AST 19 0 - 45 U/L   Lipase   Result Value Ref Range    Lipase 69 (L) 73 - 393 U/L   Lactic acid whole blood   Result Value Ref Range    Lactic Acid 1.6 0.7 - 2.0 mmol/L   CRP inflammation   Result Value Ref Range    CRP Inflammation 4.5 0.0 - 8.0 mg/L   UA with Microscopic   Result Value Ref Range    Color Urine Yellow     Appearance Urine Clear     Glucose Urine Negative NEG^Negative mg/dL    Bilirubin Urine Negative NEG^Negative    Ketones Urine Negative NEG^Negative mg/dL    Specific Gravity Urine 1.020 1.003 - 1.035    Blood Urine Negative NEG^Negative    pH Urine 7.0 5.0 - 7.0 pH    Protein Albumin Urine Negative NEG^Negative mg/dL    Urobilinogen mg/dL 0.0 0.0 - 2.0 mg/dL    Nitrite Urine Negative NEG^Negative    Leukocyte Esterase Urine Negative NEG^Negative    Source Midstream Urine     WBC Urine 1 0 - 5 /HPF    RBC Urine <1 0 - 2 /HPF    Squamous Epithelial /HPF Urine 5 (H) 0 - 1 /HPF    Mucous Urine Present (A) NEG^Negative /LPF   CT Abdomen Pelvis w/o Contrast    Narrative    CT ABDOMEN PELVIS WITHOUT CONTRAST   12/9/2018 8:16 PM     HISTORY: Abdominal pain.    TECHNIQUE: Volumetric helical sections were acquired from the lung  bases through the ischial tuberosities without IV contrast. Coronal  images were also reconstructed. Radiation dose for this scan was  reduced using automated exposure control, adjustment of the mA and/or  kV according to patient size, or iterative reconstruction technique.    COMPARISON: CT of the abdomen and pelvis without IV contrast  performed  11/20/2018.    FINDINGS: No bowel obstruction. No convincing evidence for colitis or  diverticulitis. There is a trace amount of nonspecific free fluid in  the pelvis. Unremarkable appendix. Prior cholecystectomy. There are a  few nonobstructing stones in both kidneys, measuring 0.3 cm or  smaller, unchanged. No ureteral calculi or hydronephrosis. The liver,  spleen, adrenal glands, pancreas, and kidneys have otherwise  unremarkable noncontrast appearances. The visualized lung bases are  clear. Postoperative changes of posterior marilyn and pedicle screw fusion  are noted at L4-5. There are also surgical screws crossing the left  sacroiliac joint.      Impression    IMPRESSION:   1. No acute abnormality in the abdomen or pelvis. No definite cause  for abdominal pain is identified.  2. Scattered small nonobstructing stones in both kidneys are  unchanged.    MARCELINA HALL MD     *Note: Due to a large number of results and/or encounters for the requested time period, some results have not been displayed. A complete set of results can be found in Results Review.       Medications   ondansetron (ZOFRAN) injection 4 mg (4 mg Intravenous Given 12/9/18 1744)   0.9% sodium chloride BOLUS (1,000 mLs Intravenous New Bag 12/9/18 1741)     Followed by   sodium chloride 0.9% infusion (not administered)   HYDROmorphone (PF) (DILAUDID) injection 0.5 mg (0.5 mg Intravenous Given 12/9/18 1812)   diphenhydrAMINE (BENADRYL) injection 25 mg (not administered)   promethazine (PHENERGAN) IV injection 25 mg (25 mg Intravenous Given 12/9/18 1807)       Assessments & Plan (with Medical Decision Making)  Katy is a 28-year-old female, hearing impaired, presents to the emergency department today with abdominal pain, nausea and vomiting for the last week.  Please refer to HPI and focused exam.    Patient on arrival here is tachycardic at 140, on my exam she had an apical pulse of 120.  Peripheral IV was established, patient was given  a liter of normal saline, Zofran for nausea, later requested Phenergan, then Benadryl then additional Dilaudid which is not uncommon for patient, her heart rate has responded nicely and is now 96 with a stable blood pressure and patient has been afebrile.  Patient's blood work today reveals a normal white count, hemoglobin is stable.  CMP returns with a ALT of 78 and alk phos of 192.  Urinalysis is negative for infection, lactic acid is normal at 1.6.  Lipase returns mildly low at 69 with a CRP of 4.5.  Patient continues to put her call light on, asking nursing staff for additional pain medication, Phenergan for her nausea, then asked for Benadryl.  I discussed with patient her reassuring blood test today, this does not appear to be an infectious etiology.  With her reassuring lipase, CRP and remaining electrolytes I do not feel that a CT scan is warranted however, patient was given apple juice and then reports that her symptoms have returned.  I am concerned about patient's chronic opioid use and feel that majority of her visit today may be related to drug-seeking behavior.  She does have a documented history of Crohn's so I will obtain a non-contrast CT scan to rule out any other acute intra-abdominal process.  CT scan is negative for any acute findings.  Patient was informed of her test results, she continues asked if she can be admitted overnight for ongoing pain control, again I have no clinical or physiological finding on exam that would warrant patient's admission and in light of her reassuring blood work as aforementioned including a specific gravity that is not consistent with any concerns for dehydration and a normal BUN I feel patient is quite stable to be discharged home.  I did inform patient I was willing to refill her Phenergan that she can take at home as she reports the Zofran is not effective.  Patient is on chronic oxycodone at home and I will not send her home with any other medication for pain.   Follow up appt was made for tomorrow with Dr. Rios.  Unable to schedule with patient's primary, she can certainly call the clinic tomorrow to see if she can be worked in. With regard to patient's symptoms, she has not vomited once since arriving to the ED.  She may have a viral process contributing to her symptoms.    Reasons to return to the ED were discussed with patient.  She is agreeable and discharged in stable condition with her Mom driving.       I have reviewed the nursing notes.    I have reviewed the findings, diagnosis, plan and need for follow up with the patient.       Review of your medicines      UNREVIEWED medicines. Ask your doctor about these medicines      Dose / Directions   acetaminophen 500 MG tablet  Commonly known as:  TYLENOL  Used for:  S/P lumbar fusion, Lumbar disc disease with radiculopathy      Dose:  1000 mg  Take 2 tablets (1,000 mg) by mouth 3 times daily as needed for mild pain  Quantity:  100 tablet  Refills:  0     * albuterol 108 (90 Base) MCG/ACT inhaler  Commonly known as:  PROAIR HFA/PROVENTIL HFA/VENTOLIN HFA  Used for:  Mild persistent asthma without complication      Dose:  2 puff  Inhale 2 puffs into the lungs every 6 hours as needed for shortness of breath / dyspnea or wheezing  Quantity:  3 Inhaler  Refills:  3     * albuterol (2.5 MG/3ML) 0.083% neb solution  Commonly known as:  PROVENTIL  Used for:  Mild persistent asthma without complication      Dose:  2.5 mg  Take 1 vial (2.5 mg) by nebulization every 6 hours as needed for shortness of breath / dyspnea or wheezing  Quantity:  50 vial  Refills:  11     ARIPiprazole 15 MG tablet  Commonly known as:  ABILIFY  Used for:  Adjustment disorder with mixed anxiety and depressed mood, Anxiety      Dose:  15 mg  Take 1 tablet (15 mg) by mouth At Bedtime  Quantity:  90 tablet  Refills:  3     * busPIRone 10 MG tablet  Commonly known as:  BUSPAR      Refills:  5     * busPIRone 15 MG tablet  Commonly known as:  BUSPAR       TK 1 T PO BID  Refills:  5     cephALEXin 500 MG capsule  Commonly known as:  KEFLEX  Used for:  Infected cyst of skin      Dose:  500 mg  Take 1 capsule (500 mg) by mouth 2 times daily  Quantity:  20 capsule  Refills:  0     cetirizine 10 MG tablet  Commonly known as:  zyrTEC  Used for:  Mild persistent asthma without complication      Dose:  10 mg  Take 1 tablet (10 mg) by mouth daily  Quantity:  90 tablet  Refills:  3     clonazePAM 0.5 MG tablet  Commonly known as:  klonoPIN      TK 1/2 TO 1 T D PRA  Refills:  5     cyclobenzaprine 10 MG tablet  Commonly known as:  FLEXERIL  Used for:  Chronic bilateral low back pain with left-sided sciatica      Dose:  10 mg  Take 1 tablet (10 mg) by mouth 2 times daily as needed for muscle spasms or other (back pain)  Quantity:  60 tablet  Refills:  3     diazepam 10 MG tablet  Commonly known as:  VALIUM  Used for:  Chronic pain syndrome, Chronic bilateral low back pain with left-sided sciatica, Lumbar disc disease with radiculopathy, S/P lumbar fusion      Dose:  10 mg  Take 1 tablet (10 mg) by mouth every 12 hours as needed for anxiety or sleep (MUSCLE SPASM) (one month supply)  Quantity:  60 tablet  Refills:  1     dicyclomine 20 MG tablet  Commonly known as:  BENTYL  Used for:  Hx of Crohn's disease      Dose:  20 mg  Take 1 tablet (20 mg) by mouth 4 times daily as needed (ABDOMINAL CRAMPING) AS NEEDED FOR CRAMPING  Quantity:  60 tablet  Refills:  6     DULoxetine 60 MG capsule  Commonly known as:  CYMBALTA  Used for:  Adjustment disorder with mixed anxiety and depressed mood      Dose:  60 mg  Take 1 capsule (60 mg) by mouth daily  Quantity:  90 capsule  Refills:  3     EPINEPHrine 0.3 MG/0.3ML injection 2-pack  Commonly known as:  EPIPEN/ADRENACLICK/or ANY BX GENERIC EQUIV  Used for:  Bee sting allergy      Dose:  0.3 mg  Inject 0.3 mLs (0.3 mg) into the muscle once as needed for anaphylaxis  Quantity:  0.6 mL  Refills:  1     gabapentin 300 MG capsule  Commonly known  as:  NEURONTIN  Used for:  Chronic bilateral low back pain with left-sided sciatica      Dose:  300-900 mg  Take 1-3 capsules (300-900 mg) by mouth 3 times daily  Quantity:  120 capsule  Refills:  3     HYDROcodone-acetaminophen 5-325 MG tablet  Commonly known as:  NORCO  Used for:  Nausea      Take 1 tablet every 6 hours by oral route as needed for 15 days.  Refills:  0     levocetirizine 5 MG tablet  Commonly known as:  XYZAL  Used for:  Seasonal allergic rhinitis due to pollen      Dose:  5 mg  Take 1 tablet (5 mg) by mouth every evening  Quantity:  90 tablet  Refills:  3     medroxyPROGESTERone 150 MG/ML IM injection  Commonly known as:  DEPO-PROVERA  Used for:  Encounter for initial prescription of injectable contraceptive      Dose:  150 mg  Inject 1 mL (150 mg) into the muscle every 3 months  Quantity:  3 mL  Refills:  3     methylPREDNISolone 4 MG tablet therapy pack  Commonly known as:  MEDROL DOSEPAK  Used for:  S/P lumbar fusion      Follow package instructions  Quantity:  21 tablet  Refills:  0     NONFORMULARY  Indication:  Marina Topical Gel Cream, 1 to 4 pumps  to affect area 4 times daily      Dose:  30 mL  Apply 30 mLs topically 4 times daily  Refills:  0     nystatin 361614 UNIT/GM external cream  Commonly known as:  MYCOSTATIN  Used for:  Nausea      nystatin 100,000 unit/gram topical cream  Refills:  0     ondansetron 4 MG ODT tab  Commonly known as:  ZOFRAN ODT  Used for:  Migraine with aura and without status migrainosus, not intractable      Dose:  4 mg  Take 1 tablet (4 mg) by mouth every 6 hours as needed for nausea  Quantity:  30 tablet  Refills:  5     ondansetron 4 MG tablet  Commonly known as:  ZOFRAN  Used for:  Migraine with aura and without status migrainosus, not intractable      Dose:  4-8 mg  Take 1-2 tablets (4-8 mg) by mouth every 6 hours as needed for nausea  Quantity:  30 tablet  Refills:  5     oxyCODONE IR 10 MG tablet  Commonly known as:  ROXICODONE  Used for:  S/P lumbar  fusion      Dose:  10-20 mg  Take 1-2 tablets (10-20 mg) by mouth every 4 hours as needed for severe pain (Max 8/day)  Quantity:  112 tablet  Refills:  0     oxyCODONE-acetaminophen  MG per tablet  Commonly known as:  PERCOCET  Used for:  S/P lumbar fusion, Lumbar disc disease with radiculopathy, Chronic bilateral low back pain with left-sided sciatica, Chronic pain syndrome      Dose:  1 tablet  Take 1 tablet by mouth every 6 hours as needed for severe pain (4/day)  Quantity:  60 tablet  Refills:  0     pantoprazole 40 MG EC tablet  Commonly known as:  PROTONIX  Used for:  Gastroesophageal reflux disease without esophagitis      Dose:  40 mg  Take 1 tablet (40 mg) by mouth daily Take 30-60 minutes before a meal.  Quantity:  30 tablet  Refills:  3     polyethylene glycol powder  Commonly known as:  MIRALAX  Used for:  Internal hemorrhoid, External hemorrhoids      Dose:  1 capful  Take 17 g (1 capful) by mouth daily  Quantity:  510 g  Refills:  1     prazosin 1 MG capsule  Commonly known as:  MINIPRESS      Dose:  1 mg  Take 1 mg by mouth At Bedtime  Refills:  5     * rizatriptan 5 MG ODT  Commonly known as:  MAXALT-MLT  Used for:  Migraine with aura and without status migrainosus, not intractable      Dose:  5-10 mg  Take 1-2 tablets (5-10 mg) by mouth at onset of headache for migraine May repeat in 2 hours. Max 6 tablets/24 hours.  Quantity:  18 tablet  Refills:  3     * rizatriptan 5 MG tablet  Commonly known as:  MAXALT  Used for:  Migraine with aura and without status migrainosus, not intractable      Dose:  5-10 mg  Take 1-2 tablets (5-10 mg) by mouth at onset of headache for migraine May repeat in 2 hours. Max 6 tablets/24 hours.  Quantity:  18 tablet  Refills:  3     sucralfate 1 GM tablet  Commonly known as:  CARAFATE  Used for:  Gastroesophageal reflux disease without esophagitis      Dose:  1 g  Take 1 tablet (1 g) by mouth 4 times daily as needed  Quantity:  60 tablet  Refills:  1     varenicline  0.5 MG X 11 & 1 MG X 42 tablet  Commonly known as:  CHANTIX STARTING MONTH PAK  Used for:  Tobacco use disorder      Take 0.5 mg tab daily for 3 days, then 0.5 mg tab twice daily for 4 days, then 1 mg twice daily.  Quantity:  53 tablet  Refills:  0     VIRT-PHOS 250 NEUTRAL [phone] MG Tabs      TK 1 T PO BID FOR 15 DAYS  Refills:  0         * This list has 6 medication(s) that are the same as other medications prescribed for you. Read the directions carefully, and ask your doctor or other care provider to review them with you.            CONTINUE these medicines which have NOT CHANGED      Dose / Directions   medical cannabis inhalation (Patient's own supply.  Not a prescription)      Inhale into the lungs 3 times daily as needed Reported on 3/28/2017  Refills:  0     promethazine 6.25 MG/5ML syrup  Commonly known as:  PHENERGAN      Dose:  12.5 mg  Take 10 mLs (12.5 mg) by mouth 4 times daily as needed for nausea  Quantity:  120 mL  Refills:  0           Where to get your medicines      These medications were sent to [facility] - [address] [address]    Phone:  [phone]     promethazine 6.25 MG/5ML syrup         Final diagnoses:   Abdominal pain, generalized   Non-intractable vomiting with nausea, unspecified vomiting type       12/9/2018   [facility] EMERGENCY DEPARTMENT     Flori Del Toro APRN CNP  12/09/18 7843

## 2018-12-10 ENCOUNTER — PATIENT OUTREACH (OUTPATIENT)
Dept: CARE COORDINATION | Facility: CLINIC | Age: 28
End: 2018-12-10

## 2018-12-10 ENCOUNTER — APPOINTMENT (OUTPATIENT)
Dept: GENERAL RADIOLOGY | Facility: CLINIC | Age: 28
End: 2018-12-10
Attending: FAMILY MEDICINE
Payer: MEDICARE

## 2018-12-10 ENCOUNTER — HOSPITAL ENCOUNTER (EMERGENCY)
Facility: CLINIC | Age: 28
Discharge: HOME | End: 2018-12-10
Attending: FAMILY MEDICINE | Admitting: FAMILY MEDICINE
Payer: MEDICARE

## 2018-12-10 VITALS
SYSTOLIC BLOOD PRESSURE: 119 MMHG | RESPIRATION RATE: 20 BRPM | DIASTOLIC BLOOD PRESSURE: 88 MMHG | TEMPERATURE: 97.3 F | HEART RATE: 100 BPM | OXYGEN SATURATION: 98 %

## 2018-12-10 DIAGNOSIS — R10.13 ABDOMINAL PAIN, EPIGASTRIC: ICD-10-CM

## 2018-12-10 DIAGNOSIS — R11.2 NAUSEA AND VOMITING, INTRACTABILITY OF VOMITING NOT SPECIFIED, UNSPECIFIED VOMITING TYPE: ICD-10-CM

## 2018-12-10 LAB
ANION GAP SERPL CALCULATED.3IONS-SCNC: 10 MMOL/L (ref 3–14)
BASOPHILS # BLD AUTO: 0 10E9/L (ref 0–0.2)
BASOPHILS NFR BLD AUTO: 0.5 %
BUN SERPL-MCNC: 12 MG/DL (ref 7–30)
CALCIUM SERPL-MCNC: 8.9 MG/DL (ref 8.5–10.1)
CHLORIDE SERPL-SCNC: 110 MMOL/L (ref 94–109)
CO2 SERPL-SCNC: 24 MMOL/L (ref 20–32)
CREAT SERPL-MCNC: 0.79 MG/DL (ref 0.52–1.04)
DIFFERENTIAL METHOD BLD: NORMAL
EOSINOPHIL NFR BLD AUTO: 1.2 %
ERYTHROCYTE [DISTWIDTH] IN BLOOD BY AUTOMATED COUNT: 12.2 % (ref 10–15)
GFR SERPL CREATININE-BSD FRML MDRD: 85 ML/MIN/1.7M2
GLUCOSE SERPL-MCNC: 95 MG/DL (ref 70–99)
HCT VFR BLD AUTO: 40 % (ref 35–47)
HGB BLD-MCNC: 13.8 G/DL (ref 11.7–15.7)
IMM GRANULOCYTES # BLD: 0 10E9/L (ref 0–0.4)
IMM GRANULOCYTES NFR BLD: 0.2 %
LIPASE SERPL-CCNC: 195 U/L (ref 73–393)
LYMPHOCYTES # BLD AUTO: 2.1 10E9/L (ref 0.8–5.3)
LYMPHOCYTES NFR BLD AUTO: 31.4 %
MCH RBC QN AUTO: 32.9 PG (ref 26.5–33)
MCHC RBC AUTO-ENTMCNC: 34.5 G/DL (ref 31.5–36.5)
MCV RBC AUTO: 96 FL (ref 78–100)
MONOCYTES # BLD AUTO: 0.8 10E9/L (ref 0–1.3)
MONOCYTES NFR BLD AUTO: 11.3 %
NEUTROPHILS # BLD AUTO: 3.7 10E9/L (ref 1.6–8.3)
NEUTROPHILS NFR BLD AUTO: 55.4 %
NRBC # BLD AUTO: 0 10*3/UL
NRBC BLD AUTO-RTO: 0 /100
PLATELET # BLD AUTO: 309 10E9/L (ref 150–450)
POTASSIUM SERPL-SCNC: 4 MMOL/L (ref 3.4–5.3)
RBC # BLD AUTO: 4.19 10E12/L (ref 3.8–5.2)
SODIUM SERPL-SCNC: 144 MMOL/L (ref 133–144)
WBC # BLD AUTO: 6.6 10E9/L (ref 4–11)

## 2018-12-10 PROCEDURE — 96361 HYDRATE IV INFUSION ADD-ON: CPT | Performed by: FAMILY MEDICINE

## 2018-12-10 PROCEDURE — 85025 COMPLETE CBC W/AUTO DIFF WBC: CPT | Performed by: FAMILY MEDICINE

## 2018-12-10 PROCEDURE — 74019 RADEX ABDOMEN 2 VIEWS: CPT | Mod: TC

## 2018-12-10 PROCEDURE — 25000132 ZZH RX MED GY IP 250 OP 250 PS 637: Mod: GY | Performed by: FAMILY MEDICINE

## 2018-12-10 PROCEDURE — 83690 ASSAY OF LIPASE: CPT | Performed by: FAMILY MEDICINE

## 2018-12-10 PROCEDURE — 99284 EMERGENCY DEPT VISIT MOD MDM: CPT | Mod: 25 | Performed by: FAMILY MEDICINE

## 2018-12-10 PROCEDURE — 25000128 H RX IP 250 OP 636: Performed by: FAMILY MEDICINE

## 2018-12-10 PROCEDURE — 96375 TX/PRO/DX INJ NEW DRUG ADDON: CPT | Performed by: FAMILY MEDICINE

## 2018-12-10 PROCEDURE — 80048 BASIC METABOLIC PNL TOTAL CA: CPT | Performed by: FAMILY MEDICINE

## 2018-12-10 PROCEDURE — 99284 EMERGENCY DEPT VISIT MOD MDM: CPT | Mod: Z6 | Performed by: FAMILY MEDICINE

## 2018-12-10 PROCEDURE — 96365 THER/PROPH/DIAG IV INF INIT: CPT | Performed by: FAMILY MEDICINE

## 2018-12-10 RX ORDER — HYOSCYAMINE SULFATE 0.125 MG
0.25 TABLET,DISINTEGRATING ORAL EVERY 4 HOURS PRN
Status: DISCONTINUED | OUTPATIENT
Start: 2018-12-10 | End: 2018-12-11 | Stop reason: HOSPADM

## 2018-12-10 RX ORDER — MAGNESIUM SULFATE 1 G/100ML
1 INJECTION INTRAVENOUS ONCE
Status: COMPLETED | OUTPATIENT
Start: 2018-12-10 | End: 2018-12-10

## 2018-12-10 RX ORDER — ONDANSETRON 2 MG/ML
4 INJECTION INTRAMUSCULAR; INTRAVENOUS ONCE
Status: COMPLETED | OUTPATIENT
Start: 2018-12-10 | End: 2018-12-10

## 2018-12-10 RX ORDER — OXYCODONE AND ACETAMINOPHEN 5; 325 MG/1; MG/1
1-2 TABLET ORAL EVERY 4 HOURS PRN
Qty: 2 TABLET | Refills: 0 | Status: SHIPPED | OUTPATIENT
Start: 2018-12-10 | End: 2019-01-21

## 2018-12-10 RX ADMIN — ONDANSETRON 4 MG: 2 INJECTION INTRAMUSCULAR; INTRAVENOUS at 20:40

## 2018-12-10 RX ADMIN — HYOSCYAMINE SULFATE 0.25 MG: 0.12 TABLET, ORALLY DISINTEGRATING ORAL at 20:43

## 2018-12-10 RX ADMIN — PROCHLORPERAZINE EDISYLATE 5 MG: 5 INJECTION INTRAMUSCULAR; INTRAVENOUS at 21:12

## 2018-12-10 RX ADMIN — SODIUM CHLORIDE 1000 ML: 9 INJECTION, SOLUTION INTRAVENOUS at 20:40

## 2018-12-10 RX ADMIN — MAGNESIUM SULFATE IN DEXTROSE 1 G: 10 INJECTION, SOLUTION INTRAVENOUS at 21:15

## 2018-12-10 NOTE — ED NOTES
Pt informed that provider is waiting on two labs to result, that the provider is not giving anymore medications at this time, and at request the pt was given apple juice. Pt asking for fentanyl.

## 2018-12-10 NOTE — ED NOTES
Pt has been on her call light multiple times. Primary updated on pt wanting to see her. Pain and nausea continue even with multiple doses of medications.

## 2018-12-10 NOTE — ED NOTES
Pt requested and given a sandwich, 4 juices. Tolerated it well, but has some nausea. Pt has anti emetic for home. discontinue info provided and pt denies any questions.

## 2018-12-10 NOTE — LETTER
Health Care Home - Access Care Plan    About Me  Patient Name:  Katy Islas    YOB: 1990  Age:                             28 year old   Joseph MRN:            [id number] Telephone Information:   Home Phone [phone]   Mobile [phone]       Address:    [address] [place] [address] Email address:  [email or web address]      Emergency Contact(s)  Name Relationship Lgl Grd Work Phone Home Phone Mobile Phone   1. JOSEPH ISLAS Mother No none [phone]   2. NO 2ND CONTACT*                  Health Maintenance:      My Access Plan  Medical Emergency 911   Questions or concerns during clinic hours Primary Clinic Line, I will call the clinic directly: [facility] - [phone]   24 Hour Appointment Line [phone] or  [phone]) (toll free)   24 Hour Nurse Line [phone] (toll free)   Questions or concerns outside clinic hours 24 Hour Appointment Line, I will call the after-hours on-call line:   [facility] [phone] or [phone]) (toll-free)   Preferred Urgent Care     Preferred Hospital [facility]  [phone]   Preferred Pharmacy [facility] [place], [place] - [address]     Behavioral Health Crisis Line The National Suicide Prevention Lifeline at 1-395.338.1282 or 179     My Care Team Members  Patient Care Team       Relationship Specialty Notifications Start End    Dagary-Aura Moss PA-C PCP - General Physician Assistant - Medical  9/19/16     Phone: [phone] Fax: [phone]         [address] [facility] [address] [facility] [address]    Natanael Guaman MD  Internal Medicine  8/25/11     Phone: [phone] Fax: [phone]         XXX RESIGNED  MAIN [facility] [address]    Chrissie Pfeiffer     2/2/18     Kenzie is attempting to reach Patient to assess for case  management     Phone: [phone]         Shira Ponce RN  Primary Care - CC  12/10/18     Phone: [phone] Fax: [phone]               My Medical and Care Information  Problem List   Patient Active Problem List   Diagnosis     Hearing loss     Allergic rhinitis     Migraine     Benign neoplasm of skin of lower limb, including hip     Dysmenorrhea     Crohn's colitis (H)     Mild major depression (H)     Anxiety     Chronic pain     Oral thrush     Nausea and vomiting     Grand mal seizure disorder (H)     Bipolar disorder (H)     Marijuana abuse     Adjustment disorder with mixed anxiety and depressed mood     Herpes simplex virus infection     Gastroesophageal reflux disease without esophagitis     Bee sting allergy     Mild persistent asthma without complication     Chronic bilateral low back pain with left-sided sciatica     Vitamin D deficiency     Atypical mole     Lumbar disc disease with radiculopathy     S/P lumbar fusion     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)     Upper GI bleed - suspected     Tobacco use disorder     History of pseudoseizure     Closed fracture of coccyx with nonunion, subsequent encounter     Postoperative pain     Iliotibial band syndrome affecting lower leg, right     Chondromalacia of right patellofemoral joint     Melanocytic nevus, unspecified location     Dysplastic skin lesion     Patellar maltracking, right     Right anterior knee pain     Hypophosphatemia

## 2018-12-10 NOTE — ED NOTES
"Pt on call light asking for a \"combination of benadryl and pain meds.\"  It has worked for her in the past.  "

## 2018-12-10 NOTE — ED NOTES
Pt wrote she was pissed. She has been updated several times. She refused the follow up and will see her own primary. She made a statement and I asked in order to better understand to write it down. She refused and looked away. Medicated for nausea, primary in to see.

## 2018-12-10 NOTE — ED NOTES
Pt telling ED tech that she is still in pain and is nauseated and feels like she is going to throw up after the apple juice. Provider updated and will see pt.

## 2018-12-10 NOTE — ED AVS SNAPSHOT
[facility] Emergency Department  [address] [place] [address]  Phone:  [phone]  Fax:  [phone]                                    Katy Islas   MRN: [id number]    Department:  [facility] Emergency Department   Date of Visit:  12/10/2018           After Visit Summary Signature Page    I have received my discharge instructions, and my questions have been answered. I have discussed any challenges I see with this plan with the nurse or doctor.    ..........................................................................................................................................  Patient/Patient Representative Signature      ..........................................................................................................................................  Patient Representative Print Name and Relationship to Patient    ..................................................               ................................................  Date                                   Time    ..........................................................................................................................................  Reviewed by Signature/Title    ...................................................              ..............................................  Date                                               Time          22EPIC Rev 08/18

## 2018-12-10 NOTE — PROGRESS NOTES
"Clinic Care Coordination Contact  [facility]/Voicemail    Patient appears on the IP follow up list. She has an appointment today at 430.     Patient has hearing loss, call was conducted using \"video relay\" service with  0800    Clinical Data: Care Coordinator Outreach  Outreach attempted x 1.  Left message on voicemail with call back information and requested return call.  Plan: Care Coordinator will mail out care coordination introduction letter with care coordinator contact information and explanation of care coordination services. Care Coordinator will try to reach patient again in 3-5 business days.  RADHA Otto RN Clinic Care Coordinator   [place], [place], [place]  Phone: [phone]     "

## 2018-12-10 NOTE — LETTER
[place] CARE COORDINATION  [address]  [place] [address]    December 10, 2018    Katy Islas  [address]  [place] [address]      Dear Katy,    I am a clinic care coordinator who works with Aura Rosario PA-C at [place].  I wanted to introduce myself and provide you with my contact information so that you can call me with questions or concerns about your health care. Below is a description of clinic care coordination and how I can further assist you.     The clinic care coordinator is a registered nurse and/or  who understand the health care system. The goal of clinic care coordination is to help you manage your health and improve access to the [facility] system in the most efficient manner. The registered nurse can assist you in meeting your health care goals by providing education, coordinating services, and strengthening the communication among your providers. The  can assist you with financial, behavioral, psychosocial, chemical dependency, counseling, and/or psychiatric resources.    Please feel free to contact me at [phone], with any questions or concerns. We at [facility] are focused on providing you with the highest-quality healthcare experience possible and that all starts with you.     Sincerely,   RADHA Otto RN Clinic Care Coordinator   [place]  Phone: [phone]     Enclosed: I have enclosed a copy of a 24 Hour Access Plan. This has helpful phone numbers for you to call when needed. Please keep this in an easy to access place to use as needed.

## 2018-12-11 ENCOUNTER — MYC MEDICAL ADVICE (OUTPATIENT)
Dept: FAMILY MEDICINE | Facility: OTHER | Age: 28
End: 2018-12-11

## 2018-12-11 NOTE — TELEPHONE ENCOUNTER
Sent patient message that I will not be able to do both a ED follow up AND a pain medication recheck in 1 visit.     Zach Rosario PA-C  [facility]

## 2018-12-11 NOTE — DISCHARGE INSTRUCTIONS
We did not find any worrisome cause for your abdominal pain, nausea and vomiting.  You may have a gastroenteritis.  Stay with clear liquids and advance your diet as tolerated  Reserve Percocet for severe pain tonight.

## 2018-12-11 NOTE — ED PROVIDER NOTES
[facility] ED Provider Note     Katy Islas  December 10, 2018  [id number]      CC:     Chief Complaint   Patient presents with     Nausea & Vomiting     HPI:  Katy Islas is a 28 year old female who presented to the emergency department with mid abdominal pain that worsened after dinner.  Patient was in the emergency department last night with abdominal pain, with no apparent findings on her extensive workup including CT imaging.  Patient reports onset of severe stabbing pain this evening after eating some chicken noodle soup.  Patient has thrown up 3 times since then.  She had 2 normal bowel movements earlier in the day.  Patient reports ongoing nausea.  Patient is not able to differentiate the pain she is experiencing tonight with the pain she had yesterday.  She does not have any fever, chills, chest pain, shortness of breath, cough.  She states that urination is normal as well as stooling.  Please refer to Flori Mendosa note from yesterday.    Problem List:  Patient Active Problem List    Diagnosis Date Noted     Hypophosphatemia 11/01/2018     Priority: Medium     Patellar maltracking, right 09/05/2018     Priority: Medium     Right anterior knee pain 09/05/2018     Priority: Medium     Melanocytic nevus, unspecified location 07/23/2018     Priority: Medium     Dysplastic skin lesion 07/23/2018     Priority: Medium     Iliotibial band syndrome affecting lower leg, right 12/21/2017     Priority: Medium     Chondromalacia of right patellofemoral joint 12/21/2017     Priority: Medium     Postoperative pain 11/13/2017     Priority: Medium     Closed fracture of coccyx with nonunion, subsequent encounter 10/12/2017     Priority: Medium     Upper GI bleed - suspected 07/01/2017     Priority: Medium     Tobacco use disorder 07/01/2017     Priority: Medium     History of pseudoseizure 07/01/2017     Priority: Medium     Requires  teletypewriting device for the deaf (TTD) 03/10/2017     Priority: Medium     Lumbar disc disease with radiculopathy 02/23/2017     Priority: Medium     S/P lumbar fusion 02/23/2017     Priority: Medium     Dr. YOVANI Millan, 2/23/17       Vitamin D deficiency 01/06/2017     Priority: Medium     Atypical mole 01/06/2017     Priority: Medium     Mild persistent asthma without complication 12/02/2016     Priority: Medium     Chronic bilateral low back pain with left-sided sciatica 12/02/2016     Priority: Medium     Bee sting allergy 09/16/2016     Priority: Medium     Gastroesophageal reflux disease without esophagitis 08/26/2016     Priority: Medium     Herpes simplex virus infection 11/12/2015     Priority: Medium     Adjustment disorder with mixed anxiety and depressed mood 10/14/2015     Priority: Medium     Marijuana abuse 02/22/2015     Priority: Medium     Grand mal seizure disorder (H) 10/08/2013     Priority: Medium     Bipolar disorder (H) 01/31/2013     Priority: Medium     Problem list name updated by automated process. Provider to review       Nausea and vomiting 12/13/2012     Priority: Medium     Oral thrush 06/25/2012     Priority: Medium     Chronic pain 02/09/2012     Priority: Medium     Patient is followed by Aura Rosario PA-C for ongoing prescription of pain medication.  All refills should only be approved by this provider, or covering partner.    Medication(s): Norco    Maximum quantity per month: 70  Clinic visit frequency required: Q 2 months     Controlled substance agreement:   Discussed and signed 4/25/17    Encounter-Level CSA - 01/12/2015:                 Controlled Substance Agreement - Scan on 1/26/2015  9:14 AM : Controlled Medication Agreement-01/12/15 (below)            Pain Clinic evaluation in the past: Yes       Date/Location:   [facility], fall 2016    DIRE Total Score(s):    4/25/2017   Total Score 17       Last [facility] website verification:  done on 4/25/17 by  Mona-LOVE Moss   [email or web address]           Anxiety 09/22/2011     Priority: Medium     Mild major depression (H) 08/29/2011     Priority: Medium     Crohn's colitis (H) 08/04/2009     Priority: Medium     Dysmenorrhea 05/11/2007     Priority: Medium     Benign neoplasm of skin of lower limb, including hip 03/16/2004     Priority: Medium     Migraine      Priority: Medium     Dr. Farrar - Neurology.  Now on Inderal.  Seems to be working.  Follow-up 9/08  Problem list name updated by automated process. Provider to review       Hearing loss      Priority: Medium     Congenital  Problem list name updated by automated process. Provider to review       Allergic rhinitis      Priority: Medium     Problem list name updated by automated process. Provider to review         Past Medical History:   Diagnosis Date     Abdominal pain 10/31- 11/4/2005     Allergic rhinitis, cause unspecified      Arthritis      C. 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, without mention of intractable epilepsy      Migraine 07/21/12     Migraine, unspecified, without mention of intractable migraine without mention of status migrainosus      Mild intermittent asthma      Mycoplasma infection in conditions classified elsewhere and of unspecified site      Other chronic pain      Renal disease      Unspecified hearing loss        MEDS:     oxyCODONE-acetaminophen (PERCOCET) 5-325 MG tablet   acetaminophen (TYLENOL) 500 MG tablet   albuterol (2.5 MG/3ML) 0.083% neb solution   albuterol (PROAIR HFA/PROVENTIL HFA/VENTOLIN HFA) 108 (90 Base) MCG/ACT Inhaler   ARIPiprazole (ABILIFY) 15 MG tablet   busPIRone (BUSPAR) 10 MG tablet   busPIRone (BUSPAR) 15 MG tablet   cephALEXin (KEFLEX)  500 MG capsule   cetirizine (ZYRTEC) 10 MG tablet   clonazePAM (KLONOPIN) 0.5 MG tablet   cyclobenzaprine (FLEXERIL) 10 MG tablet   diazepam (VALIUM) 10 MG tablet   dicyclomine (BENTYL) 20 MG tablet   DULoxetine (CYMBALTA) 60 MG EC capsule   EPINEPHrine (EPIPEN/ADRENACLICK/OR ANY BX GENERIC EQUIV) 0.3 MG/0.3ML injection 2-pack   gabapentin (NEURONTIN) 300 MG capsule   HYDROcodone-acetaminophen (NORCO) 5-325 MG per tablet   K Phos Mono-Sod Phos Di & Mono (VIRT-PHOS 250 NEUTRAL) 155-852-130 MG TABS   levocetirizine (XYZAL) 5 MG tablet   medical cannabis inhalation (Patient's own supply.  Not a prescription)   medroxyPROGESTERone (DEPO-PROVERA) 150 MG/ML injection   methylPREDNISolone (MEDROL DOSEPAK) 4 MG tablet   NONFORMULARY   nystatin (MYCOSTATIN) cream   ondansetron (ZOFRAN ODT) 4 MG ODT tab   ondansetron (ZOFRAN) 4 MG tablet   oxyCODONE IR (ROXICODONE) 10 MG tablet   oxyCODONE-acetaminophen (PERCOCET)  MG per tablet   pantoprazole (PROTONIX) 40 MG EC tablet   polyethylene glycol (MIRALAX) powder   prazosin (MINIPRESS) 1 MG capsule   promethazine (PHENERGAN) 6.25 MG/5ML syrup   rizatriptan (MAXALT) 5 MG tablet   rizatriptan (MAXALT-MLT) 5 MG ODT tab   sucralfate (CARAFATE) 1 GM tablet   varenicline (CHANTIX STARTING MONTH PAK) 0.5 MG X 11 & 1 MG X 42 tablet       ALLERGIES:    Allergies   Allergen Reactions     Ativan [Lorazepam] Hives     At the IV site     Baclofen      hives     Bees Hives, Swelling and Difficulty breathing     Caffeine      Contrast Dye      Hives,   Updated 5/10/2016 CT Contrast.     Iodine Hives     Methocarbamol Swelling     Metoclopramide      Other reaction(s): Tremors  LW Reaction: shaking/sweating     Midazolam      Other reaction(s): Agitation     Monosodium Glutamate      Morphine Other (See Comments)     Difficulty with urination     Nsaids Other (See Comments)     GI BLEED x2     Other (Do Not Use) Other (See Comments)     Xanaflex- pt becomes disoriented and loses bladder  control     Reglan [Metoclopramide Hcl] Other (See Comments)     shaking     Soma [Carisoprodol] Visual Disturbance     Sleep walking     Tizanidine      Topamax Other (See Comments)     Topamax [Topiramate] Nausea     Tingling  GI/Vomit     Tramadol      Severe Headache, Seizure Risk     Tylenol W/Codeine [Acetaminophen-Codeine] Nausea and Itching     Tylenol 3     Valium Other (See Comments)     Becomes aggressive and angry     Versed Other (See Comments)     Zolmitriptan      Makes face feel like its twitching     Droperidol Anxiety     Flu Virus Vaccine Rash      Arm swelling       Past Surgical History:   Procedure Laterality Date     COLONOSCOPY  7/1/2009    [facility].     FUSION SPINE POSTERIOR MINIMALLY INVASIVE ONE LEVEL N/A 2/23/2017    Procedure: FUSION SPINE POSTERIOR MINIMALLY INVASIVE ONE LEVEL;  L4-5 Oblique Lateral Lumbar Interbody Fusion   Epidural steroid injection.   Transpedicular Bone marrow aspiration;  Surgeon: Jeniffer Eugene MD;  Location: [facility] OR     HC COLONOSCOPY THRU STOMA WITH BIOPSY  10/29/2003    Impression is that of normal appearing colonoscopy, without evidence of rectal bleeding.     HC COLONOSCOPY THRU STOMA, DIAGNOSTIC  10/00    normal     HC COLONOSCOPY THRU STOMA, DIAGNOSTIC  Oct 2009    Dr López- normal     HC EEG AWAKE AND SLEEP      abnormal     HC MRI BRAIN W/O CONTRAST  12/00    normal     HC REMOVAL GALLBLADDER  8/5/2009    [facility], [facility].     HC UGI ENDOSCOPY DIAG W BIOPSY  11/11/09    Normal esophagus     HC UGI ENDOSCOPY, SIMPLE EXAM  7/00, 10/00    mild chronic esophagitis and duodenitis, neg H pylori     HC UGI ENDOSCOPY, SIMPLE EXAM  01/20/2005    Esophagogastroduodenoscopy, colonoscopy with biopsies.  [facility] UGI ENDOSCOPY, SIMPLE EXAM  7/1/2009    [facility], [place]      UGI ENDOSCOPY, SIMPLE EXAM  11/11/2009    attempted upper GI, pt. could not tolerate procedure:[place]  [facility]     ORTHOPEDIC SURGERY  October 19,2011    diskectomy L4-L5       Social History     Tobacco Use     Smoking status: Former Smoker     Packs/day: 0.50     Types: Cigarettes     Smokeless tobacco: Never Used     Tobacco comment: quit July 2018 - 4 days no cig   Substance Use Topics     Alcohol use: Yes     Alcohol/week: 0.6 oz     Types: 1 Cans of beer per week     Comment: once in while      Drug use: Yes     Types: Marijuana     Comment: Medical Marijuana currently         Review of Systems   Except as noted in HPI, all other systems were reviewed and are negative    Physical Exam     Vitals were reviewed  Patient Vitals for the past 8 hrs:   BP Temp Temp src Pulse Resp SpO2   12/10/18 2139 -- -- -- 100 20 98 %   12/10/18 2013 119/88 97.3  F (36.3  C) Temporal 136 20 99 %     GENERAL APPEARANCE: Alert, mild to moderate distress; no obvious vomiting at this time  FACE: normal facies  EYES: Pupils are equal  HENT: normal external exam  NECK: no adenopathy or asymmetry  RESP: normal respiratory effort; clear breath sounds bilaterally  CV: regular rate and rhythm; no significant murmurs, gallops or rubs  ABD: soft, mid abdominal tenderness; no rebound or guarding; bowel sounds are normal  MS: no gross deformities noted; normal muscle tone.  EXT: No calf tenderness or pitting edema  SKIN: no worrisome rash  NEURO: no facial droop; no focal deficits, speech is abnormal        Available Lab/Imaging Results     Results for orders placed or performed during the hospital encounter of 12/10/18 (from the past 24 hour(s))   CBC with platelets differential   Result Value Ref Range    WBC 6.6 4.0 - 11.0 10e9/L    RBC Count 4.19 3.8 - 5.2 10e12/L    Hemoglobin 13.8 11.7 - 15.7 g/dL    Hematocrit 40.0 35.0 - 47.0 %    MCV 96 78 - 100 fl    MCH 32.9 26.5 - 33.0 pg    MCHC 34.5 31.5 - 36.5 g/dL    RDW 12.2 10.0 - 15.0 %    Platelet Count 309 150 - 450 10e9/L    Diff Method Automated Method     % Neutrophils 55.4 %     % Lymphocytes 31.4 %    % Monocytes 11.3 %    % Eosinophils 1.2 %    % Basophils 0.5 %    % Immature Granulocytes 0.2 %    Nucleated RBCs 0 0 /100    Absolute Neutrophil 3.7 1.6 - 8.3 10e9/L    Absolute Lymphocytes 2.1 0.8 - 5.3 10e9/L    Absolute Monocytes 0.8 0.0 - 1.3 10e9/L    Absolute Basophils 0.0 0.0 - 0.2 10e9/L    Abs Immature Granulocytes 0.0 0 - 0.4 10e9/L    Absolute Nucleated RBC 0.0    Basic metabolic panel   Result Value Ref Range    Sodium 144 133 - 144 mmol/L    Potassium 4.0 3.4 - 5.3 mmol/L    Chloride 110 (H) 94 - 109 mmol/L    Carbon Dioxide 24 20 - 32 mmol/L    Anion Gap 10 3 - 14 mmol/L    Glucose 95 70 - 99 mg/dL    Urea Nitrogen 12 7 - 30 mg/dL    Creatinine 0.79 0.52 - 1.04 mg/dL    GFR Estimate 85 >60 mL/min/1.7m2    GFR Estimate If Black >90 >60 mL/min/1.7m2    Calcium 8.9 8.5 - 10.1 mg/dL   Lipase   Result Value Ref Range    Lipase 195 73 - 393 U/L   XR Abdomen 2 Views    Narrative    ABDOMEN TWO-THREE VIEW  12/10/2018 9:02 PM     HISTORY: Mid abdominal pain.     COMPARISON: October 23, 2017    FINDINGS: Small amount of stool. No free air. There are no air filled  distended loops of small bowel. The colon is not distended. The lung  bases are unremarkable.      Impression    IMPRESSION: Nonobstructed bowel gas pattern.     *Note: Due to a large number of results and/or encounters for the requested time period, some results have not been displayed. A complete set of results can be found in Results Review.              Impression     Final diagnoses:   Abdominal pain, epigastric   Nausea and vomiting, intractability of vomiting not specified, unspecified vomiting type         ED Course & Medical Decision Making   Katy Islas is a 28 year old female who presented to the emergency department with mid abdominal pain worsened after dinner.  Patient states that she has had 3 episodes of vomiting, but no diarrhea.  She was in the emergency department last night with abdominal pain, and her  extensive workup was unrevealing.  Patient states that she had a bowl of chicken noodle soup, and the pain worsened shortly afterwards.  She states she was not able to keep anything down despite having some Zofran at home.  Patient was seen shortly after arrival.  Vital signs were unremarkable.  She was initially tachycardic with heart rate of 136.  Patient received IV fluids and the medications listed below.  Patient had no further episodes of nausea and vomiting.  Her workup today reveals a normal CBC, basic metabolic panel, lipase level.  Abdominal x-rays were personally reviewed by me and reveal no significant air-fluid levels or distended loops of bowel to suggest an obstructive pattern.  Patient was stable for discharge home.  She has Zofran at home which she will continue to take.  She was given 2 tablets of Percocet that she can take sparingly tonight for breakthrough pain.  Follow-up with her primary care provider in 2 days if not improving.      Medications   hyoscyamine ODT tablet 0.25 mg (0.25 mg Oral Given 12/10/18 2043)   ondansetron (ZOFRAN) injection 4 mg (4 mg Intravenous Given 12/10/18 2040)   0.9% sodium chloride BOLUS (0 mLs Intravenous Stopped 12/10/18 2139)   magnesium sulfate 1 gm in 100mL D5W intermittent infusion (0 g Intravenous Stopped 12/10/18 2139)   prochlorperazine (COMPAZINE) injection 5 mg (5 mg Intravenous Given 12/10/18 2112)            Written after-visit summary and instructions were given at the time of discharge.    Follow-up plan:  Aura Rosario PA-C  [address] [place] [address]  [facility] [id number]  [phone]    In 2 days  if not improving         This note was dictated using electronic voice recognition software and although reviewed, may contain grammatical and spelling errors.        Emily Scherer MD  12/10/18 [id number]

## 2018-12-11 NOTE — TELEPHONE ENCOUNTER
Sent pt kathe requesting a response of when she would like to be seen. We didn't get a call back yesterday, there was no answer this morning either.  Jenni Luo MA

## 2018-12-11 NOTE — PROGRESS NOTES
Clinic Care Coordination Contact  [facility]/Voicemail    Referral Source: IP Report  Clinical Data: Care Coordinator Outreach    Outreach attempted x 2.  Left message on voicemail with call back information and requested return call.  Plan:   1. Care Coordinator mailed out care coordination introduction letter on 12/10/18.   2. Care Coordinator will do no further outreaches at this time.  3. Patient does have a PCP follow up appointment for 12/13/185    RADHA Otto RN Clinic Care Coordinator   [place], [place], [place]  Phone: [phone]

## 2018-12-11 NOTE — TELEPHONE ENCOUNTER
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 LM for patient to return call.     Ines Roy, RN, BSN

## 2018-12-11 NOTE — ED NOTES
Patient requesting benadryl, promethazine and dilaudid. Patient was told we need x ray before looking into those time of medications. Patient was given Zofran for nausea. Patient was given hyoscyamine for her ab pain. Told was notified of this. Patient wrote what she wanted on a piece of paper and let doctor know her requests.

## 2018-12-13 ENCOUNTER — OFFICE VISIT (OUTPATIENT)
Dept: FAMILY MEDICINE | Facility: OTHER | Age: 28
End: 2018-12-13
Payer: MEDICARE

## 2018-12-13 VITALS
SYSTOLIC BLOOD PRESSURE: 104 MMHG | TEMPERATURE: 98.1 F | HEART RATE: 108 BPM | WEIGHT: 169.2 LBS | OXYGEN SATURATION: 98 % | RESPIRATION RATE: 18 BRPM | DIASTOLIC BLOOD PRESSURE: 68 MMHG | BODY MASS INDEX: 31.53 KG/M2

## 2018-12-13 DIAGNOSIS — Z30.013 ENCOUNTER FOR INITIAL PRESCRIPTION OF INJECTABLE CONTRACEPTIVE: ICD-10-CM

## 2018-12-13 DIAGNOSIS — M54.42 CHRONIC BILATERAL LOW BACK PAIN WITH LEFT-SIDED SCIATICA: ICD-10-CM

## 2018-12-13 DIAGNOSIS — G89.29 CHRONIC BILATERAL LOW BACK PAIN WITH LEFT-SIDED SCIATICA: ICD-10-CM

## 2018-12-13 DIAGNOSIS — G89.4 CHRONIC PAIN SYNDROME: Primary | ICD-10-CM

## 2018-12-13 DIAGNOSIS — Z91.030 BEE STING ALLERGY: ICD-10-CM

## 2018-12-13 DIAGNOSIS — Z98.1 S/P LUMBAR FUSION: ICD-10-CM

## 2018-12-13 DIAGNOSIS — M51.16 LUMBAR DISC DISEASE WITH RADICULOPATHY: ICD-10-CM

## 2018-12-13 LAB — BETA HCG QUAL IFA URINE: NEGATIVE

## 2018-12-13 PROCEDURE — 96372 THER/PROPH/DIAG INJ SC/IM: CPT | Performed by: PHYSICIAN ASSISTANT

## 2018-12-13 PROCEDURE — 99214 OFFICE O/P EST MOD 30 MIN: CPT | Mod: 25 | Performed by: PHYSICIAN ASSISTANT

## 2018-12-13 PROCEDURE — 84703 CHORIONIC GONADOTROPIN ASSAY: CPT | Performed by: PHYSICIAN ASSISTANT

## 2018-12-13 RX ORDER — DIAZEPAM 10 MG
10 TABLET ORAL
Qty: 30 TABLET | Refills: 5 | Status: SHIPPED | OUTPATIENT
Start: 2018-12-13 | End: 2019-04-11

## 2018-12-13 RX ORDER — CYCLOBENZAPRINE HCL 10 MG
10 TABLET ORAL 2 TIMES DAILY PRN
Qty: 60 TABLET | Refills: 5 | Status: SHIPPED | OUTPATIENT
Start: 2018-12-13 | End: 2019-02-28

## 2018-12-13 RX ORDER — MEDROXYPROGESTERONE ACETATE 150 MG/ML
150 INJECTION, SUSPENSION INTRAMUSCULAR
Qty: 3 ML | Refills: 3 | OUTPATIENT
Start: 2018-12-13 | End: 2022-01-07

## 2018-12-13 RX ORDER — EPINEPHRINE 0.3 MG/.3ML
0.3 INJECTION SUBCUTANEOUS
Qty: 0.6 ML | Refills: 3 | Status: SHIPPED | OUTPATIENT
Start: 2018-12-13 | End: 2019-11-12

## 2018-12-13 RX ORDER — OXYCODONE AND ACETAMINOPHEN 10; 325 MG/1; MG/1
1 TABLET ORAL EVERY 6 HOURS PRN
Qty: 75 TABLET | Refills: 0 | Status: SHIPPED | OUTPATIENT
Start: 2018-12-14 | End: 2019-01-21

## 2018-12-13 ASSESSMENT — ANXIETY QUESTIONNAIRES
3. WORRYING TOO MUCH ABOUT DIFFERENT THINGS: NOT AT ALL
7. FEELING AFRAID AS IF SOMETHING AWFUL MIGHT HAPPEN: NOT AT ALL
GAD7 TOTAL SCORE: 3
7. FEELING AFRAID AS IF SOMETHING AWFUL MIGHT HAPPEN: NOT AT ALL
1. FEELING NERVOUS, ANXIOUS, OR ON EDGE: SEVERAL DAYS
GAD7 TOTAL SCORE: 3
2. NOT BEING ABLE TO STOP OR CONTROL WORRYING: NOT AT ALL
5. BEING SO RESTLESS THAT IT IS HARD TO SIT STILL: SEVERAL DAYS
4. TROUBLE RELAXING: SEVERAL DAYS
GAD7 TOTAL SCORE: 3
6. BECOMING EASILY ANNOYED OR IRRITABLE: NOT AT ALL

## 2018-12-13 ASSESSMENT — PATIENT HEALTH QUESTIONNAIRE - PHQ9
SUM OF ALL RESPONSES TO PHQ QUESTIONS 1-9: 7
SUM OF ALL RESPONSES TO PHQ QUESTIONS 1-9: 7
10. IF YOU CHECKED OFF ANY PROBLEMS, HOW DIFFICULT HAVE THESE PROBLEMS MADE IT FOR YOU TO DO YOUR WORK, TAKE CARE OF THINGS AT HOME, OR GET ALONG WITH OTHER PEOPLE: NOT DIFFICULT AT ALL

## 2018-12-13 ASSESSMENT — PAIN SCALES - GENERAL: PAINLEVEL: MODERATE PAIN (5)

## 2018-12-13 NOTE — PROGRESS NOTES
BP: 104/68    LAST PAP/EXAM:   Lab Results   Component Value Date    PAP NIL 2017     URINE HCG:{DEPO HC}    The following medication was given:     MEDICATION: Depo Provera 150mg  ROUTE: IM  SITE: {SITE:208216}  : {DEPO :287840}  LOT #: ***  EXP:***  NEXT INJECTION DUE: 19 - 3/14/19   Provider: ***

## 2018-12-14 ASSESSMENT — PATIENT HEALTH QUESTIONNAIRE - PHQ9: SUM OF ALL RESPONSES TO PHQ QUESTIONS 1-9: 7

## 2018-12-14 ASSESSMENT — ANXIETY QUESTIONNAIRES: GAD7 TOTAL SCORE: 3

## 2018-12-21 ENCOUNTER — TELEPHONE (OUTPATIENT)
Dept: FAMILY MEDICINE | Facility: OTHER | Age: 28
End: 2018-12-21

## 2018-12-21 NOTE — TELEPHONE ENCOUNTER
Left message for Odalys at [facility] to return call. What results is she looking for? I do not see any recent pathology reports  Katy Sheikh CMA

## 2018-12-21 NOTE — TELEPHONE ENCOUNTER
Please send [facility] pt's recent pathology reports.  Please call with any questions to srinath at [phone] and fax to [phone]

## 2018-12-24 NOTE — TELEPHONE ENCOUNTER
The doctor from the [facility] called and unsure why the reason we page them. If anything else can call the skin clinic back Wednesday.     Moisés Bazan MA

## 2018-12-26 NOTE — TELEPHONE ENCOUNTER
Finally was able to speak with Odalys from [facility], she wanted pathology results from a mole that was removed on patient's right arm. Was able to find it and faxed over results to [phone] and placed in fax file.    Anisha Beebe MA

## 2019-01-02 NOTE — PROGRESS NOTES
"  SUBJECTIVE:   Katy Islas is a 28 year old female who presents to clinic today for the following health issues:      HPI     Sign language interpretor utilized during the entire visit.    Concern - Jaw Pain  Onset: 1 month    Description:   \"hurts in the upper part of the jaw on left side\"    Intensity: moderate    Progression of Symptoms:  Intermittent - \"some days bothers me more than others 5/10 pain\"    Accompanying Signs & Symptoms:  Sometimes spreads to ear    Previous history of similar problem:   \"i know I need a filling and a root canal but it hasn't spread so far back\"    Precipitating factors:   Worsened by: cold hurts teeth    Alleviating factors:  Improved by: putting ice on it    Therapies Tried and outcome: NA    She has dentist appointment on 01/08 as she is having some left upper tooth pain. She is also having pain higher up in the jaw on the left just in front of the ear, worse with chewing. No obvious swelling. No tooth/gum drainage.     Back Pain Follow Up       Description:   Location of pain:  bilateral  Character of pain: dull ache  Pain radiation: sometimes will radiate to the left leg worse than the right leg - \"it will do right side in the spring\"  Since last visit, pain is:  Worsened - \"beacuse i'm doing so much house work - recently had to shovel a lot and had to dogsit recently and had to lift the dog - I don't know what my weight limit is. Also want to ask if I can look for work. It's been 9 weeks.\"  New numbness or weakness in legs, not attributed to pain:  YES- \"sometimes - it feels like the floor is vibrating\"     Intensity: Currently 5/10    History:   Pain interferes with job: Not applicable  Therapies tried without relief: none  Therapies tried with relief: medical marijuana, cold, heat, opioids and Physical Therapy (no in person  for Physical Therapy - wondering about options for this - wants to be referred to somewhere else that will - currently goes to [facility])     She has been undergoing PT but states they do not provide an in person signs language interpretor which she especially needs with the pool therapy so she would like to go somewhere else. This seems to help with her low back pain and she would also like to undergo therapy for her continued right knee pain. She has seen Dr. Maldonado for injections with only short term benefit. She has been taking Percocet 3-4 times per day lately as her pain has been worse as she has been doing more house work and is lifting her 40 pounds dog more frequently.        Plan from visit with Zach Rosario 12/13/18    - Patient s/p SI joint fusion 10/26/18, also coccyx resection (10/18/17 with subsequent wound infection requiring wound vac) and prior lumbar fusion (L4-5 on 2/23/17)  - As previous note, letter patient got from surgeon reviewed (sent to scanning) and my discussion with his nurse is that they will no longer prescribe since patient was asking for too much  - Self tapered from 6/day to 4/day, trying to take as needed     - Last RX given 11/30/18 for Percocet  mg, taking 4 per day      Discussed that per our last visit, she was not to take 4/day but was to use as needed and taper, #60 was expected to be last prescription      Per my last note       Instructed to keep tapering as tolerated, 3/day for a few days, then 2/day, etc.      Will need appointment for further refills   -  reviewed     - Discussed I will give her one FINAL prescription of Percocet #75, she is to use as sparingly      Reviewed use and side effects       No further prescriptions       Can not have NSAIDs due to GI bleeds, so should rely on Tylenol and her Flexeril, reviewed proper use of each      - Referral placed to [facility] physical therapy, discussed I do not know if there is pool therapy but she can ask when  calls      Answers for HPI/ROS submitted by the patient on 1/4/2019   If you checked off any problems, how difficult  "have these problems made it for you to do your work, take care of things at home, or get along with other people?: Not difficult at all  PHQ9 TOTAL SCORE: 1  JULIETH 7 TOTAL SCORE: 4    Problem list and histories reviewed & adjusted, as indicated.  Additional history: none    ROS:  GENERAL: Denies fever, fatigue, weakness, weight gain, or weight loss.  HEENT: Eyes-Denies pain, redness, loss of vision, double or blurred vision.     Ears/Nose- +Left upper jaw pain. Denies tinnitus, loss of hearing, epistaxis, decreased sense of smell, nasal congestion. Denies loss of sense of taste, dry mouth, or sore throat.   CARDIOVASCULAR: Denies chest pain, shortness of breath, irregular heartbeats,  palpitations, or edema.  RESPIRATORY: Denies cough, hemoptysis, and shortness of breath.  MUSCULOSKELETAL: +Chronic low back pain, right knee pain.   NEUROLOGIC: Denies headache, fainting, dizziness, memory loss, numbness, tingling, or seizures.  PSYCHIATRIC: Denies depression, anxiety, mood swings, and thoughts of suicide.    OBJECTIVE:     /80   Pulse 118   Temp 98.1  F (36.7  C) (Temporal)   Resp 16   Ht 1.56 m (5' 1.42\")   Wt 77.6 kg (171 lb)   LMP 12/12/2018 (Approximate)   SpO2 100%   BMI 31.87 kg/m    Body mass index is 31.87 kg/m .  GENERAL: healthy, alert and no distress  HENT: ear canals and TM's normal. Mild tenderness over the back, left upper molar with filling in place. Tenderness over the left TMJ, worse when opening and closing mouth.  RESP: lungs clear to auscultation - no rales, rhonchi or wheezes  CV: regular rate and rhythm, normal S1 S2, no S3 or S4, no murmur, click or rub  MS: no gross musculoskeletal defects noted. Mild tenderness over the bilateral lumbar paraspinal musculature.   NEURO: Normal strength and tone, mentation intact and speech normal. Cranial nerves II-XII are grossly intact. DTRs are 2+/4 throughout and symmetric. Gait is stable.   PSYCH: mentation appears normal, affect " normal/bright    ASSESSMENT/PLAN:       ICD-10-CM    1. Chronic pain syndrome G89.4 PHYSICAL THERAPY REFERRAL   2. Lumbar disc disease with radiculopathy M51.16 PHYSICAL THERAPY REFERRAL   3. S/P lumbar fusion Z98.1 PHYSICAL THERAPY REFERRAL   4. Right knee pain, unspecified chronicity M25.561 PHYSICAL THERAPY REFERRAL   5. 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 Tooth pain K08.89    7. TMJ (temporomandibular joint syndrome) M26.609        1-5. Continue chronic low back pain along with right knee pain that has recently been worsened by increased activity at home along with lifting her dog. I recommend she try to cut back on the intensity of her home chores and avoid lifting her dog to avoid any worsening pain as she is still healing from her previous surgeries. She was notified that her prescription of Percocet prescribed by Zach last month was the FINAL narcotic prescription she will be receiving and she states she understands and still has some left. I recommend she use Tylenol as needed along with continued nightly Flexeril. I am hopeful physical therapy will continue to improve her symptoms as well but will refer her to [facility] instead so she can have an in person interpretor.  I placed the referral to treat her right knee and her low back. I am hoping that if she can strengthen her core and legs, she will be able to perform more physical activity and lose weight which will help her pain as well.    6-7. Left tooth pain over molar filling that will be further evaluated by her dentist in a few days. No signs of infection or abscess. She does have left TMJ tenderness consistent with TMJ syndrome so I recommend soft foods, Tylenol and heat as needed. The Flexeril should also help and she can discuss a mouth guard with her dentist to avoid clenching her teeth.    I recommend she follow up in 3 months with her PCP for a recheck.       Bob Monson PA-C  [facility]  [place]

## 2019-01-04 ENCOUNTER — OFFICE VISIT (OUTPATIENT)
Dept: FAMILY MEDICINE | Facility: OTHER | Age: 29
End: 2019-01-04
Payer: MEDICARE

## 2019-01-04 VITALS
WEIGHT: 171 LBS | SYSTOLIC BLOOD PRESSURE: 122 MMHG | TEMPERATURE: 98.1 F | DIASTOLIC BLOOD PRESSURE: 80 MMHG | RESPIRATION RATE: 16 BRPM | HEIGHT: 61 IN | BODY MASS INDEX: 32.28 KG/M2 | OXYGEN SATURATION: 100 % | HEART RATE: 118 BPM

## 2019-01-04 DIAGNOSIS — K08.89 TOOTH PAIN: ICD-10-CM

## 2019-01-04 DIAGNOSIS — M51.16 LUMBAR DISC DISEASE WITH RADICULOPATHY: ICD-10-CM

## 2019-01-04 DIAGNOSIS — M22.8X1 PATELLAR MALTRACKING, RIGHT: ICD-10-CM

## 2019-01-04 DIAGNOSIS — M26.609 TMJ (TEMPOROMANDIBULAR JOINT SYNDROME): ICD-10-CM

## 2019-01-04 DIAGNOSIS — G89.4 CHRONIC PAIN SYNDROME: Primary | ICD-10-CM

## 2019-01-04 DIAGNOSIS — M25.561 RIGHT KNEE PAIN, UNSPECIFIED CHRONICITY: ICD-10-CM

## 2019-01-04 DIAGNOSIS — Z98.1 S/P LUMBAR FUSION: ICD-10-CM

## 2019-01-04 PROCEDURE — 99214 OFFICE O/P EST MOD 30 MIN: CPT | Performed by: PHYSICIAN ASSISTANT

## 2019-01-04 ASSESSMENT — ANXIETY QUESTIONNAIRES
3. WORRYING TOO MUCH ABOUT DIFFERENT THINGS: SEVERAL DAYS
7. FEELING AFRAID AS IF SOMETHING AWFUL MIGHT HAPPEN: NOT AT ALL
7. FEELING AFRAID AS IF SOMETHING AWFUL MIGHT HAPPEN: NOT AT ALL
2. NOT BEING ABLE TO STOP OR CONTROL WORRYING: SEVERAL DAYS
5. BEING SO RESTLESS THAT IT IS HARD TO SIT STILL: SEVERAL DAYS
GAD7 TOTAL SCORE: 4
1. FEELING NERVOUS, ANXIOUS, OR ON EDGE: NOT AT ALL
6. BECOMING EASILY ANNOYED OR IRRITABLE: NOT AT ALL
4. TROUBLE RELAXING: SEVERAL DAYS

## 2019-01-04 ASSESSMENT — MIFFLIN-ST. JEOR: SCORE: 1444.64

## 2019-01-04 ASSESSMENT — PATIENT HEALTH QUESTIONNAIRE - PHQ9
10. IF YOU CHECKED OFF ANY PROBLEMS, HOW DIFFICULT HAVE THESE PROBLEMS MADE IT FOR YOU TO DO YOUR WORK, TAKE CARE OF THINGS AT HOME, OR GET ALONG WITH OTHER PEOPLE: NOT DIFFICULT AT ALL
SUM OF ALL RESPONSES TO PHQ QUESTIONS 1-9: 1
SUM OF ALL RESPONSES TO PHQ QUESTIONS 1-9: 1

## 2019-01-05 ASSESSMENT — ANXIETY QUESTIONNAIRES: GAD7 TOTAL SCORE: 4

## 2019-01-05 ASSESSMENT — PATIENT HEALTH QUESTIONNAIRE - PHQ9: SUM OF ALL RESPONSES TO PHQ QUESTIONS 1-9: 1

## 2019-01-08 ENCOUNTER — TRANSFERRED RECORDS (OUTPATIENT)
Dept: HEALTH INFORMATION MANAGEMENT | Facility: CLINIC | Age: 29
End: 2019-01-08

## 2019-01-11 ENCOUNTER — HOSPITAL ENCOUNTER (EMERGENCY)
Facility: CLINIC | Age: 29
Discharge: HOME | End: 2019-01-12
Attending: FAMILY MEDICINE | Admitting: FAMILY MEDICINE
Payer: MEDICARE

## 2019-01-11 DIAGNOSIS — E86.0 DEHYDRATION: ICD-10-CM

## 2019-01-11 DIAGNOSIS — R10.84 ABDOMINAL PAIN, GENERALIZED: ICD-10-CM

## 2019-01-11 DIAGNOSIS — R11.2 NON-INTRACTABLE VOMITING WITH NAUSEA, UNSPECIFIED VOMITING TYPE: ICD-10-CM

## 2019-01-11 LAB
ALBUMIN SERPL-MCNC: 3.6 G/DL (ref 3.4–5)
ALP SERPL-CCNC: 116 U/L (ref 40–150)
ALT SERPL W P-5'-P-CCNC: 51 U/L (ref 0–50)
AMPHETAMINES UR QL: NOT DETECTED NG/ML
ANION GAP SERPL CALCULATED.3IONS-SCNC: 5 MMOL/L (ref 3–14)
AST SERPL W P-5'-P-CCNC: 23 U/L (ref 0–45)
BARBITURATES UR QL SCN: NOT DETECTED NG/ML
BASOPHILS # BLD AUTO: 0 10E9/L (ref 0–0.2)
BASOPHILS NFR BLD AUTO: 0.3 %
BENZODIAZ UR QL SCN: ABNORMAL NG/ML
BILIRUB SERPL-MCNC: 0.2 MG/DL (ref 0.2–1.3)
BUN SERPL-MCNC: 13 MG/DL (ref 7–30)
BUPRENORPHINE UR QL: NOT DETECTED NG/ML
CALCIUM SERPL-MCNC: 9.1 MG/DL (ref 8.5–10.1)
CANNABINOIDS UR QL: ABNORMAL NG/ML
CHLORIDE SERPL-SCNC: 106 MMOL/L (ref 94–109)
CO2 SERPL-SCNC: 32 MMOL/L (ref 20–32)
COCAINE UR QL SCN: NOT DETECTED NG/ML
CREAT SERPL-MCNC: 0.83 MG/DL (ref 0.52–1.04)
D-METHAMPHET UR QL: NOT DETECTED NG/ML
DIFFERENTIAL METHOD BLD: NORMAL
EOSINOPHIL NFR BLD AUTO: 1 %
ERYTHROCYTE [DISTWIDTH] IN BLOOD BY AUTOMATED COUNT: 12.6 % (ref 10–15)
GFR SERPL CREATININE-BSD FRML MDRD: >90 ML/MIN/{1.73_M2}
GLUCOSE SERPL-MCNC: 89 MG/DL (ref 70–99)
HCG UR QL: NEGATIVE
HCT VFR BLD AUTO: 39.7 % (ref 35–47)
HGB BLD-MCNC: 13.4 G/DL (ref 11.7–15.7)
IMM GRANULOCYTES # BLD: 0 10E9/L (ref 0–0.4)
IMM GRANULOCYTES NFR BLD: 0.3 %
LACTATE BLD-SCNC: 1.2 MMOL/L (ref 0.7–2)
LYMPHOCYTES # BLD AUTO: 1.3 10E9/L (ref 0.8–5.3)
LYMPHOCYTES NFR BLD AUTO: 18.1 %
MCH RBC QN AUTO: 32.8 PG (ref 26.5–33)
MCHC RBC AUTO-ENTMCNC: 33.8 G/DL (ref 31.5–36.5)
MCV RBC AUTO: 97 FL (ref 78–100)
METHADONE UR QL SCN: NOT DETECTED NG/ML
MONOCYTES # BLD AUTO: 0.6 10E9/L (ref 0–1.3)
MONOCYTES NFR BLD AUTO: 8.3 %
NEUTROPHILS # BLD AUTO: 5.2 10E9/L (ref 1.6–8.3)
NEUTROPHILS NFR BLD AUTO: 72 %
NRBC # BLD AUTO: 0 10*3/UL
NRBC BLD AUTO-RTO: 0 /100
OPIATES UR QL SCN: NOT DETECTED NG/ML
OXYCODONE UR QL SCN: NOT DETECTED NG/ML
PCP UR QL SCN: NOT DETECTED NG/ML
PLATELET # BLD AUTO: 269 10E9/L (ref 150–450)
POTASSIUM SERPL-SCNC: 4.4 MMOL/L (ref 3.4–5.3)
PROPOXYPH UR QL: NOT DETECTED NG/ML
PROT SERPL-MCNC: 7.8 G/DL (ref 6.8–8.8)
RBC # BLD AUTO: 4.09 10E12/L (ref 3.8–5.2)
SODIUM SERPL-SCNC: 143 MMOL/L (ref 133–144)
TRICYCLICS UR QL SCN: NOT DETECTED NG/ML
WBC # BLD AUTO: 7.2 10E9/L (ref 4–11)

## 2019-01-11 PROCEDURE — 99285 EMERGENCY DEPT VISIT HI MDM: CPT | Mod: Z6 | Performed by: FAMILY MEDICINE

## 2019-01-11 PROCEDURE — 96361 HYDRATE IV INFUSION ADD-ON: CPT | Performed by: FAMILY MEDICINE

## 2019-01-11 PROCEDURE — 99285 EMERGENCY DEPT VISIT HI MDM: CPT | Mod: 25 | Performed by: FAMILY MEDICINE

## 2019-01-11 PROCEDURE — 85025 COMPLETE CBC W/AUTO DIFF WBC: CPT | Performed by: FAMILY MEDICINE

## 2019-01-11 PROCEDURE — 80053 COMPREHEN METABOLIC PANEL: CPT | Performed by: FAMILY MEDICINE

## 2019-01-11 PROCEDURE — 80306 DRUG TEST PRSMV INSTRMNT: CPT | Performed by: FAMILY MEDICINE

## 2019-01-11 PROCEDURE — 96375 TX/PRO/DX INJ NEW DRUG ADDON: CPT | Performed by: FAMILY MEDICINE

## 2019-01-11 PROCEDURE — 96376 TX/PRO/DX INJ SAME DRUG ADON: CPT | Performed by: FAMILY MEDICINE

## 2019-01-11 PROCEDURE — 83605 ASSAY OF LACTIC ACID: CPT | Performed by: FAMILY MEDICINE

## 2019-01-11 PROCEDURE — 81025 URINE PREGNANCY TEST: CPT | Performed by: FAMILY MEDICINE

## 2019-01-11 PROCEDURE — 96374 THER/PROPH/DIAG INJ IV PUSH: CPT | Performed by: FAMILY MEDICINE

## 2019-01-11 PROCEDURE — 25000128 H RX IP 250 OP 636: Performed by: FAMILY MEDICINE

## 2019-01-11 RX ORDER — PROMETHAZINE HYDROCHLORIDE 25 MG/ML
25 INJECTION, SOLUTION INTRAMUSCULAR; INTRAVENOUS ONCE
Status: COMPLETED | OUTPATIENT
Start: 2019-01-11 | End: 2019-01-11

## 2019-01-11 RX ORDER — DIPHENHYDRAMINE HYDROCHLORIDE 50 MG/ML
25 INJECTION INTRAMUSCULAR; INTRAVENOUS ONCE
Status: COMPLETED | OUTPATIENT
Start: 2019-01-11 | End: 2019-01-11

## 2019-01-11 RX ORDER — FENTANYL CITRATE 50 UG/ML
100 INJECTION, SOLUTION INTRAMUSCULAR; INTRAVENOUS ONCE
Status: COMPLETED | OUTPATIENT
Start: 2019-01-11 | End: 2019-01-11

## 2019-01-11 RX ORDER — SODIUM CHLORIDE 9 MG/ML
INJECTION, SOLUTION INTRAVENOUS CONTINUOUS
Status: DISCONTINUED | OUTPATIENT
Start: 2019-01-11 | End: 2019-01-12 | Stop reason: HOSPADM

## 2019-01-11 RX ORDER — LIDOCAINE 40 MG/G
CREAM TOPICAL
Status: DISCONTINUED | OUTPATIENT
Start: 2019-01-11 | End: 2019-01-12 | Stop reason: HOSPADM

## 2019-01-11 RX ADMIN — SODIUM CHLORIDE 1000 ML: 9 INJECTION, SOLUTION INTRAVENOUS at 20:12

## 2019-01-11 RX ADMIN — SODIUM CHLORIDE: 9 INJECTION, SOLUTION INTRAVENOUS at 21:13

## 2019-01-11 RX ADMIN — PROMETHAZINE HYDROCHLORIDE 25 MG: 25 INJECTION INTRAMUSCULAR; INTRAVENOUS at 19:55

## 2019-01-11 RX ADMIN — PROMETHAZINE HYDROCHLORIDE 25 MG: 25 INJECTION INTRAMUSCULAR; INTRAVENOUS at 21:43

## 2019-01-11 RX ADMIN — DIPHENHYDRAMINE HYDROCHLORIDE 25 MG: 50 INJECTION, SOLUTION INTRAMUSCULAR; INTRAVENOUS at 19:54

## 2019-01-11 RX ADMIN — SODIUM CHLORIDE 1000 ML: 9 INJECTION, SOLUTION INTRAVENOUS at 19:54

## 2019-01-11 RX ADMIN — DIPHENHYDRAMINE HYDROCHLORIDE 25 MG: 50 INJECTION, SOLUTION INTRAMUSCULAR; INTRAVENOUS at 21:43

## 2019-01-11 RX ADMIN — FENTANYL CITRATE 100 MCG: 50 INJECTION INTRAMUSCULAR; INTRAVENOUS at 21:44

## 2019-01-11 RX ADMIN — FENTANYL CITRATE 100 MCG: 50 INJECTION INTRAMUSCULAR; INTRAVENOUS at 19:55

## 2019-01-11 ASSESSMENT — MIFFLIN-ST. JEOR: SCORE: 1449.36

## 2019-01-11 NOTE — ED AVS SNAPSHOT
[facility] Emergency Department  [address] [place] [address]  Phone:  [phone]  Fax:  [phone]                                    Katy Islas   MRN: [id number]    Department:  [facility] Emergency Department   Date of Visit:  1/11/2019           After Visit Summary Signature Page    I have received my discharge instructions, and my questions have been answered. I have discussed any challenges I see with this plan with the nurse or doctor.    ..........................................................................................................................................  Patient/Patient Representative Signature      ..........................................................................................................................................  Patient Representative Print Name and Relationship to Patient    ..................................................               ................................................  Date                                   Time    ..........................................................................................................................................  Reviewed by Signature/Title    ...................................................              ..............................................  Date                                               Time          22EPIC Rev 08/18

## 2019-01-12 VITALS
HEART RATE: 98 BPM | OXYGEN SATURATION: 99 % | WEIGHT: 170 LBS | SYSTOLIC BLOOD PRESSURE: 110 MMHG | HEIGHT: 62 IN | RESPIRATION RATE: 20 BRPM | BODY MASS INDEX: 31.28 KG/M2 | DIASTOLIC BLOOD PRESSURE: 78 MMHG | TEMPERATURE: 97.2 F

## 2019-01-12 NOTE — ED NOTES
Pt put her call light on when I first got here at 7 pm. Then she put her call light on again as I was getting her medications. Has been laying comfortably, has not had any obvious signs of nausea. Pt level changed as she was put as a level 5 at triage.

## 2019-01-12 NOTE — ED PROVIDER NOTES
History     Chief Complaint   Patient presents with     Nausea & Vomiting     HPI  Katy Islas is a 28 year old female who presents to the ED this evening with a 2-day history of nausea vomiting and abdominal pain.  She estimates that she has vomited 20 times today, the last was about an hour ago.  Has not been able to keep anything down.  Also has diffuse sharp cramping abdominal pain.  Has had some loose stools but no watery diarrhea.  Has not noticed any blood.  Denies any fevers today.  May have had a fever yesterday but she is uncertain.  A little dizzy and lightheaded if she gets up too quickly.  She reads lips well and declined a .    She states that Benadryl and promethazine worked well for her.    She has chronic pain and has Percocet at home.    Has had 2 abdominal CTs since November of this past year without evidence of an etiology for acute pain.  She did have several small nonobstructing stones in each kidney.      Problem List:    Patient Active Problem List    Diagnosis Date Noted     Hypophosphatemia 11/01/2018     Priority: Medium     Patellar maltracking, right 09/05/2018     Priority: Medium     Right anterior knee pain 09/05/2018     Priority: Medium     Melanocytic nevus, unspecified location 07/23/2018     Priority: Medium     Dysplastic skin lesion 07/23/2018     Priority: Medium     Iliotibial band syndrome affecting lower leg, right 12/21/2017     Priority: Medium     Chondromalacia of right patellofemoral joint 12/21/2017     Priority: Medium     Postoperative pain 11/13/2017     Priority: Medium     Closed fracture of coccyx with nonunion, subsequent encounter 10/12/2017     Priority: Medium     Upper GI bleed - suspected 07/01/2017     Priority: Medium     Tobacco use disorder 07/01/2017     Priority: Medium     History of pseudoseizure 07/01/2017     Priority: Medium     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) 03/10/2017     Priority: Medium      Lumbar disc disease with radiculopathy 02/23/2017     Priority: Medium     S/P lumbar fusion 02/23/2017     Priority: Medium     Dr. YOVANI Millan, 2/23/17       Vitamin D deficiency 01/06/2017     Priority: Medium     Atypical mole 01/06/2017     Priority: Medium     Mild persistent asthma without complication 12/02/2016     Priority: Medium     Chronic bilateral low back pain with left-sided sciatica 12/02/2016     Priority: Medium     Bee sting allergy 09/16/2016     Priority: Medium     Gastroesophageal reflux disease without esophagitis 08/26/2016     Priority: Medium     Herpes simplex virus infection 11/12/2015     Priority: Medium     Adjustment disorder with mixed anxiety and depressed mood 10/14/2015     Priority: Medium     Marijuana abuse 02/22/2015     Priority: Medium     Grand mal seizure disorder (H) 10/08/2013     Priority: Medium     Bipolar disorder (H) 01/31/2013     Priority: Medium     Problem list name updated by automated process. Provider to review       Nausea and vomiting 12/13/2012     Priority: Medium     Oral thrush 06/25/2012     Priority: Medium     Chronic pain 02/09/2012     Priority: Medium     Patient is followed by Aura Rosario PA-C for ongoing prescription of pain medication.  All refills should only be approved by this provider, or covering partner.    Medication(s): Norco    Maximum quantity per month: 70  Clinic visit frequency required: Q 2 months     Controlled substance agreement:   Discussed and signed 4/25/17    Encounter-Level CSA - 01/12/2015:                 Controlled Substance Agreement - Scan on 1/26/2015  9:14 AM : Controlled Medication Agreement-01/12/15 (below)            Pain Clinic evaluation in the past: Yes       Date/Location:   [facility], fall 2016    DIRE Total Score(s):    4/25/2017   Total Score 17       Last [facility] website verification:  done on 4/25/17 by LOVE Maki   [email or web address]           Anxiety 09/22/2011      Priority: Medium     Mild major depression (H) 08/29/2011     Priority: Medium     Crohn's colitis (H) 08/04/2009     Priority: Medium     Dysmenorrhea 05/11/2007     Priority: Medium     Benign neoplasm of skin of lower limb, including hip 03/16/2004     Priority: Medium     Migraine      Priority: Medium     Dr. Farrar - Neurology.  Now on Inderal.  Seems to be working.  Follow-up 9/08  Problem list name updated by automated process. Provider to review       Hearing loss      Priority: Medium     Congenital  Problem list name updated by automated process. Provider to review       Allergic rhinitis      Priority: Medium     Problem list name updated by automated process. Provider to review          Past Medical History:    Past Medical History:   Diagnosis Date     Abdominal pain 10/31- 11/4/2005     Allergic rhinitis, cause unspecified      Arthritis      C. 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     FUSION SPINE POSTERIOR MINIMALLY INVASIVE ONE LEVEL N/A 2/23/2017    Procedure: FUSION SPINE POSTERIOR MINIMALLY INVASIVE ONE LEVEL;  L4-5 Oblique Lateral Lumbar Interbody Fusion   Epidural steroid injection.   Transpedicular Bone marrow aspiration;  Surgeon: Jeniffer Eugene  MD;  Location: [facility] OR     HC COLONOSCOPY THRU STOMA WITH BIOPSY  10/29/2003    Impression is that of normal appearing colonoscopy, without evidence of rectal bleeding.     HC COLONOSCOPY THRU STOMA, DIAGNOSTIC  10/00    normal     HC COLONOSCOPY THRU STOMA, DIAGNOSTIC  Oct 2009    Dr López- normal     HC EEG AWAKE AND SLEEP      abnormal     HC MRI BRAIN W/O CONTRAST  12/00    normal     HC REMOVAL GALLBLADDER  8/5/2009    [facility].     HC UGI ENDOSCOPY DIAG W BIOPSY  11/11/09    Normal esophagus     HC UGI ENDOSCOPY, SIMPLE EXAM  7/00, 10/00    mild chronic esophagitis and duodenitis, neg H pylori     HC UGI ENDOSCOPY, SIMPLE EXAM  01/20/2005    Esophagogastroduodenoscopy, colonoscopy with biopsies.  [facility]     HC UGI ENDOSCOPY, SIMPLE EXAM  7/1/2009    [facility], [facility].      UGI ENDOSCOPY, SIMPLE EXAM  11/11/2009    attempted upper GI, pt. could not tolerate procedure:[facility]     ORTHOPEDIC SURGERY  October 19,2011    diskectomy L4-L5       Family History:    Family History   Problem Relation Age of Onset     Gastrointestinal Disease Brother         severe Crohn's     Neurologic Disorder Brother         Seizures post head injury     Depression Brother      Substance Abuse Brother      Genitourinary Problems Father         kidney stones     Diabetes Father      Heart Disease Father         Open heart surgery     Breast Cancer Maternal Grandmother      Parkinsonism Maternal Grandmother      Cerebrovascular Disease Paternal Grandmother      Cancer Maternal Grandfather         Lung     Cardiovascular Paternal Grandfather         Heart Attack     Substance Abuse Mother      Lung Cancer Paternal Uncle      Cancer Paternal Uncle      Lung Cancer Maternal Uncle        Social History:  Marital Status:  Single [1]  Social History     Tobacco Use     Smoking status: Former Smoker     Packs/day: 0.50     Types: Cigarettes     Smokeless tobacco: Never  "Used     Tobacco comment: quit July 2018 - 4 days no cig   Substance Use Topics     Alcohol use: Yes     Alcohol/week: 0.6 oz     Types: 1 Cans of beer per week     Comment:  (july)     Drug use: Yes     Types: Marijuana     Comment: Medical Marijuana currently        Medications:      acetaminophen (TYLENOL) 500 MG tablet   albuterol (2.5 MG/3ML) 0.083% neb solution   albuterol (PROAIR HFA/PROVENTIL HFA/VENTOLIN HFA) 108 (90 Base) MCG/ACT Inhaler   ARIPiprazole (ABILIFY) 15 MG tablet   busPIRone (BUSPAR) 10 MG tablet   busPIRone (BUSPAR) 15 MG tablet   cetirizine (ZYRTEC) 10 MG tablet   clonazePAM (KLONOPIN) 0.5 MG tablet   cyclobenzaprine (FLEXERIL) 10 MG tablet   diazepam (VALIUM) 10 MG tablet   dicyclomine (BENTYL) 20 MG tablet   DULoxetine (CYMBALTA) 60 MG EC capsule   EPINEPHrine (EPIPEN/ADRENACLICK/OR ANY BX GENERIC EQUIV) 0.3 MG/0.3ML injection 2-pack   gabapentin (NEURONTIN) 300 MG capsule   K Phos Mono-Sod Phos Di & Mono (VIRT-PHOS 250 NEUTRAL) 155-852-130 MG TABS   levocetirizine (XYZAL) 5 MG tablet   medical cannabis inhalation (Patient's own supply.  Not a prescription)   medroxyPROGESTERone (DEPO-PROVERA) 150 MG/ML IM injection   NONFORMULARY   nystatin (MYCOSTATIN) cream   ondansetron (ZOFRAN ODT) 4 MG ODT tab   ondansetron (ZOFRAN) 4 MG tablet   oxyCODONE IR (ROXICODONE) 10 MG tablet   oxyCODONE-acetaminophen (PERCOCET)  MG per tablet   oxyCODONE-acetaminophen (PERCOCET) 5-325 MG tablet   pantoprazole (PROTONIX) 40 MG EC tablet   polyethylene glycol (MIRALAX) powder   prazosin (MINIPRESS) 1 MG capsule   rizatriptan (MAXALT) 5 MG tablet   rizatriptan (MAXALT-MLT) 5 MG ODT tab   sucralfate (CARAFATE) 1 GM tablet   varenicline (CHANTIX STARTING MONTH PAK) 0.5 MG X 11 & 1 MG X 42 tablet         Review of Systems   All other systems reviewed and are negative.      Physical Exam   BP: 118/68  Pulse: 126  Temp: 99.5  F (37.5  C)  Resp: 16  Height: 157.5 cm (5' 2\")  Weight: 77.1 kg (170 lb)  SpO2: 99 " %      Physical Exam   Constitutional: She is oriented to person, place, and time. She appears well-developed and well-nourished. She appears distressed (mild).   HENT:   Oral mucosa is tacky.   Eyes: EOM are normal. Pupils are equal, round, and reactive to light.   Neck: Neck supple.   Cardiovascular: Regular rhythm. Tachycardia present.   Pulmonary/Chest: Effort normal and breath sounds normal.   Abdominal: Soft. There is tenderness (lower abd mild/mod RLQ>LLQ). There is no rebound and no guarding.   Musculoskeletal: Normal range of motion.   Neurological: She is alert and oriented to person, place, and time.   Skin: Skin is warm and dry.   Psychiatric: She has a normal mood and affect.       ED Course  (with Medical Decision Making)      Her labs are normal.  She has had 2 CT scans in the past couple of months and with a normal white count and labs I do not want to do a third one and give her another dose of ionizing radiation.           ED Course as of Jan 11 2357 Fri Jan 11, 2019 1929 IV placed.  Labs drawn.  2 L normal saline flush ordered.  Benadryl 25 mg and promethazine 25 mg IV.  1 dose of fentanyl for pain while sorting things out.  Gallbladder is surgically absent.  Appendix remains.    2038 White count normal at 7.2 with a normal differential.  Comprehensive profile unremarkable.    2353 She was given a second dose of Benadryl, promethazine and fentanyl and then was able to sleep.  She is feeling much better and was able to tolerate oral liquids.  Her urine tox screen came back positive for benzos and cannabinoids.  She is on Valium but admits to smoking marijuana.  I told her that sometimes marijuana can cause severe nausea/vomiting and abdominal pain and she may want to consider abstaining.  Something to think about.  She seemed receptive to that idea and think to be for seeing her tonight.  She feels better and feels ready to go home.  She has antiemetics at home.    2357 Should note that her  urine pregnancy test was negative.      Procedures               Critical Care time:  none               Results for orders placed or performed during the hospital encounter of 01/11/19 (from the past 24 hour(s))   CBC with platelets differential   Result Value Ref Range    WBC 7.2 4.0 - 11.0 10e9/L    RBC Count 4.09 3.8 - 5.2 10e12/L    Hemoglobin 13.4 11.7 - 15.7 g/dL    Hematocrit 39.7 35.0 - 47.0 %    MCV 97 78 - 100 fl    MCH 32.8 26.5 - 33.0 pg    MCHC 33.8 31.5 - 36.5 g/dL    RDW 12.6 10.0 - 15.0 %    Platelet Count 269 150 - 450 10e9/L    Diff Method Automated Method     % Neutrophils 72.0 %    % Lymphocytes 18.1 %    % Monocytes 8.3 %    % Eosinophils 1.0 %    % Basophils 0.3 %    % Immature Granulocytes 0.3 %    Nucleated RBCs 0 0 /100    Absolute Neutrophil 5.2 1.6 - 8.3 10e9/L    Absolute Lymphocytes 1.3 0.8 - 5.3 10e9/L    Absolute Monocytes 0.6 0.0 - 1.3 10e9/L    Absolute Basophils 0.0 0.0 - 0.2 10e9/L    Abs Immature Granulocytes 0.0 0 - 0.4 10e9/L    Absolute Nucleated RBC 0.0    Comprehensive metabolic panel   Result Value Ref Range    Sodium 143 133 - 144 mmol/L    Potassium 4.4 3.4 - 5.3 mmol/L    Chloride 106 94 - 109 mmol/L    Carbon Dioxide 32 20 - 32 mmol/L    Anion Gap 5 3 - 14 mmol/L    Glucose 89 70 - 99 mg/dL    Urea Nitrogen 13 7 - 30 mg/dL    Creatinine 0.83 0.52 - 1.04 mg/dL    GFR Estimate >90 >60 mL/min/[1.73_m2]    GFR Estimate If Black >90 >60 mL/min/[1.73_m2]    Calcium 9.1 8.5 - 10.1 mg/dL    Bilirubin Total 0.2 0.2 - 1.3 mg/dL    Albumin 3.6 3.4 - 5.0 g/dL    Protein Total 7.8 6.8 - 8.8 g/dL    Alkaline Phosphatase 116 40 - 150 U/L    ALT 51 (H) 0 - 50 U/L    AST 23 0 - 45 U/L   Lactic acid whole blood   Result Value Ref Range    Lactic Acid 1.2 0.7 - 2.0 mmol/L   HCG qualitative urine   Result Value Ref Range    HCG Qual Urine Negative NEG^Negative   Urine Drugs of Abuse Screen Panel 13   Result Value Ref Range    Cannabinoids (21-fak-9-carboxy-9-THC) Detected, Abnormal Result  (A) NDET^Not Detected ng/mL    Phencyclidine (Phencyclidine) Not Detected NDET^Not Detected ng/mL    Cocaine (Benzoylecgonine) Not Detected NDET^Not Detected ng/mL    Methamphetamine (d-Methamphetamine) Not Detected NDET^Not Detected ng/mL    Opiates (Morphine) Not Detected NDET^Not Detected ng/mL    Amphetamine (d-Amphetamine) Not Detected NDET^Not Detected ng/mL    Benzodiazepines (Nordiazepam) Detected, Abnormal Result (A) NDET^Not Detected ng/mL    Tricyclic Antidepressants (Desipramine) Not Detected NDET^Not Detected ng/mL    Methadone (Methadone) Not Detected NDET^Not Detected ng/mL    Barbiturates (Butalbital) Not Detected NDET^Not Detected ng/mL    Oxycodone (Oxycodone) Not Detected NDET^Not Detected ng/mL    Propoxyphene (Norpropoxyphene) Not Detected NDET^Not Detected ng/mL    Buprenorphine (Buprenorphine) Not Detected NDET^Not Detected ng/mL     *Note: Due to a large number of results and/or encounters for the requested time period, some results have not been displayed. A complete set of results can be found in Results Review.       Medications   lidocaine 1 % 1 mL (not administered)   lidocaine (LMX4) kit (not administered)   sodium chloride (PF) 0.9% PF flush 3 mL (not administered)   sodium chloride (PF) 0.9% PF flush 3 mL (3 mLs Intracatheter Given 1/11/19 1931)   0.9% sodium chloride BOLUS (0 mLs Intravenous Stopped 1/11/19 2112)     Followed by   0.9% sodium chloride BOLUS (0 mLs Intravenous Stopped 1/11/19 2113)     Followed by   sodium chloride 0.9% infusion ( Intravenous Stopped 1/11/19 2356)   diphenhydrAMINE (BENADRYL) injection 25 mg (25 mg Intravenous Given 1/11/19 1954)   promethazine (PHENERGAN) IV injection 25 mg (25 mg Intravenous Given 1/11/19 1955)   fentaNYL (PF) (SUBLIMAZE) injection 100 mcg (100 mcg Intravenous Given 1/11/19 1955)   diphenhydrAMINE (BENADRYL) injection 25 mg (25 mg Intravenous Given 1/11/19 2143)   promethazine (PHENERGAN) IV injection 25 mg (25 mg Intravenous Given  1/11/19 2143)   fentaNYL (PF) (SUBLIMAZE) injection 100 mcg (100 mcg Intravenous Given 1/11/19 2144)       Assessments & Plan     I have reviewed the nursing notes.    I have reviewed the findings, diagnosis, plan and need for follow up with the patient.          Medication List      There are no discharge medications for this visit.         Final diagnoses:   Dehydration   Non-intractable vomiting with nausea, unspecified vomiting type   Abdominal pain, generalized       1/11/2019   [facility] [place] EMERGENCY DEPARTMENT     Nathan Swanson MD  01/11/19 0842       Nathan Swanson MD  01/11/19 [id number]

## 2019-01-12 NOTE — ED NOTES
Pt was sleeping when I entered the room. Ice chips given per her request. She already was wondering about home medications for pain and nausea.

## 2019-01-12 NOTE — ED NOTES
Pt was sleeping when I went in the room. Able to hook up IV and place sat monitor before she woke up. Had one forced hacking cough. Medicated per order. Pt went back asleep before I left the room.

## 2019-01-12 NOTE — ED NOTES
Pt pulled sat monitor off. Went to put it back on and pt woke up from a very sound sleep and stated she threw up 3 times, yet her barf bag is empty. She also stated she needs more pain medications. Pt went right back to sleep before I left the room. Sparingly ice chip given.

## 2019-01-12 NOTE — ED TRIAGE NOTES
2 days of nausea and vomiting and abdominal pain. Katy says Usually Benadryl and Promethazine helps.

## 2019-01-12 NOTE — ED NOTES
Pt has called multiple times. Ambulated and voided. Has an occasional very dramatically produced hacking cough, but no N/V. Tends to drift off. Requesting the same medications as before.

## 2019-01-12 NOTE — DISCHARGE INSTRUCTIONS
Your blood work was normal tonight which is reassuring.  You may use the nausea medication that you have at home as needed.  As we discussed, marijuana can sometimes cause severe nausea/vomiting and abdominal pain.  When this happens, the only way to avoid this in the future is to stop using marijuana.  Just something to think about.  Recheck in clinic with your primary provider if persistent problems.  It was nice visiting with you again tonight.  I am glad you are feeling better and hope you continue to improve.    Thank you for choosing [facility]. We appreciate the opportunity to meet your urgent medical needs. Please let us know if we could have done anything to make your stay more satisfying.    After discharge, please closely monitor for any new or worsening symptoms. Return to the Emergency Department if you develop any acute worsening signs or symptoms.    If you had lab work, cultures or imaging studies done during your stay, the final results may still be pending. We will call you if your plan of care needs to change. However, if you are not improving as expected, please follow up with your primary care provider or clinic.     Start any prescription medications that were prescribed to you and take them as directed.     Please see additional handouts that may be pertinent to your condition.

## 2019-01-14 ENCOUNTER — PATIENT OUTREACH (OUTPATIENT)
Dept: CARE COORDINATION | Facility: CLINIC | Age: 29
End: 2019-01-14

## 2019-01-14 NOTE — PROGRESS NOTES
"Clinic Care Coordination Contact  Clinic Care Coordination Contact  OUTREACH  Referral Information:  Referral Source: IP Report  Primary Diagnosis: Other (include Comment box)(nausea and vomiting)  Chief Complaint   Patient presents with     Clinic Care Coordination - Follow-up     ER to home, RN     Utilization    Last refreshed: 1/14/2019  8:50 AM:  Hospital Admissions 0           Last refreshed: 1/14/2019  8:50 AM:  ED Visits 8           Last refreshed: 1/14/2019  8:50 AM:  No Show Count (past year) 11              Current as of: 1/14/2019  8:50 AM          Clinical Concerns:  Current Medical Concerns:  Patient appears on ER follow up list for dehydration, nausea and vomiting.Patient has hearing loss, call was conducted using \"video relay\" service. Patient denies any symptoms today. She is feeling \"better.\" she has a copy of her discharge summary. She was to follow up with PCP if her symptoms were not getting better. Patient will not make a PCP follow up appointment as she is better. No further questions or concerns at this time.   Current Behavioral Concerns: none at this time    Education Provided to patient: RN CC educated about Care Coordination Services, discharge instructions, medications reviewed and follow up   Health Maintenance Reviewed: Not assessed  Clinical Pathway: None  Functional Status:  Bed or wheelchair confined:: No     Psychosocial:  Mental health DX:: Yes  Mental health DX how managed:: Medication  Resources and Interventions:  Current Resources: access plan and intro letter   Goals: na  Patient/Caregiver understanding: yes  Plan:   1. Patient will monitor her symptoms, and follow her er discharge summary.   2. Patient declined a need for care coordination. However, patient can reach out to CC RN as needed and CC RN will be available to patient.    RADHA Otto RN Clinic Care Coordinator   [facility], [place], [place]  Phone: [phone]     "

## 2019-01-15 ENCOUNTER — MYC MEDICAL ADVICE (OUTPATIENT)
Dept: FAMILY MEDICINE | Facility: OTHER | Age: 29
End: 2019-01-15

## 2019-01-15 ENCOUNTER — TELEPHONE (OUTPATIENT)
Dept: FAMILY MEDICINE | Facility: OTHER | Age: 29
End: 2019-01-15

## 2019-01-15 NOTE — TELEPHONE ENCOUNTER
Reason for Call:  Other prescription    Detailed comments: patient has been taking Valium, she use to take it 2 times a day but recently decreased to 1 time a day but her back has been really bothering her more lately and she is wondering if she can get a new prescription for 2 times a day?    Phone Number Patient can be reached at: Home number on file [phone] (home)    Best Time: anytime    Can we leave a detailed message on this number? No    Call taken on 1/15/2019 at 1:33 PM by Nani Brito

## 2019-01-15 NOTE — PROGRESS NOTES
SUBJECTIVE:   Katy Islas is a 28 year old female who presents to clinic today for the following health issues:    HPI  Concern - Discuss increasing Valium to 2 X daily for night time back pain  Onset:   Ongoing    Description:     Bothering her more at night    Intensity: moderate    Progression of Symptoms:  same, intermittent and waxing and waning    Accompanying Signs & Symptoms:   stiffness     Previous history of similar problem:   yes    Precipitating factors:   Worsened by:    House keeping - standing too long    Alleviating factors:  Improved by: Rest and medications -   Ice -  Hot baths    Therapies Tried and outcome: see above    - Has not started PT yet, did not hear from them so called today and left message to schedule. Was unable to do [facility].   - Reports her pain is bad at night.   - Would like to go to two Valium daily  - Reports her body is immune to flexeril and does not work anymore, just makes her sleepy.   - Plans to see neurology in February, has not made appointment for this yet.   - She reports that she is supposed to have an additional fusion, not certain where, she thinks the right.     Problem list and histories reviewed & adjusted, as indicated.  Additional history: as documented    Plan from visit with Zach Rosario 12/13/18  - Patient s/p SI joint fusion 10/26/18, also coccyx resection (10/18/17 with subsequent wound infection requiring wound vac) and prior lumbar fusion (L4-5 on 2/23/17)  - As previous note, letter patient got from surgeon reviewed (sent to scanning) and my discussion with his nurse is that they will no longer prescribe since patient was asking for too much  - Self tapered from 6/day to 4/day, trying to take as needed  - Last RX given 11/30/18 for Percocet  mg, taking 4 per day      Discussed that per our last visit, she was not to take 4/day but was to use as needed and taper, #60 was expected to be last prescription      Per my last note        Instructed to keep tapering as tolerated, 3/day for a few days, then 2/day, etc.      Will need appointment for further refills   -  reviewed  - Discussed I will give her one FINAL prescription of Percocet #75, she is to use as sparingly      Reviewed use and side effects       No further prescriptions       Can not have NSAIDs due to GI bleeds, so should rely on Tylenol and her Flexeril, reviewed proper use of each     OV JM 01/04/2019  1-5. Continue chronic low back pain along with right knee pain that has recently been worsened by increased activity at home along with lifting her dog. I recommend she try to cut back on the intensity of her home chores and avoid lifting her dog to avoid any worsening pain as she is still healing from her previous surgeries. She was notified that her prescription of Percocet prescribed by Zach last month was the FINAL narcotic prescription she will be receiving and she states she understands and still has some left. I recommend she use Tylenol as needed along with continued nightly Flexeril. I am hopeful physical therapy will continue to improve her symptoms as well but will refer her to [facility] instead so she can have an in person interpretor.  I placed the referral to treat her right knee and her low back. I am hoping that if she can strengthen her core and legs, she will be able to perform more physical activity and lose weight which will help her pain as well.      Current Outpatient Medications   Medication Sig Dispense Refill     albuterol (2.5 MG/3ML) 0.083% neb solution Take 1 vial (2.5 mg) by nebulization every 6 hours as needed for shortness of breath / dyspnea or wheezing 50 vial 11     albuterol (PROAIR HFA/PROVENTIL HFA/VENTOLIN HFA) 108 (90 Base) MCG/ACT Inhaler Inhale 2 puffs into the lungs every 6 hours as needed for shortness of breath / dyspnea or wheezing 3 Inhaler 3     ARIPiprazole (ABILIFY) 15 MG tablet Take 1 tablet (15 mg) by mouth At Bedtime  90 tablet 3     busPIRone (BUSPAR) 15 MG tablet TK 1 T PO BID  5     clonazePAM (KLONOPIN) 0.5 MG tablet TK 1/2 TO 1 T D PRA  5     cyclobenzaprine (FLEXERIL) 10 MG tablet Take 1 tablet (10 mg) by mouth 2 times daily as needed for muscle spasms or other (back pain) 60 tablet 5     diazepam (VALIUM) 10 MG tablet Take 1 tablet (10 mg) by mouth nightly as needed for anxiety or sleep (MUSCLE SPASM) (one month supply) 30 tablet 5     dicyclomine (BENTYL) 20 MG tablet Take 1 tablet (20 mg) by mouth 4 times daily as needed (ABDOMINAL CRAMPING) AS NEEDED FOR CRAMPING 60 tablet 6     DULoxetine (CYMBALTA) 60 MG EC capsule Take 1 capsule (60 mg) by mouth daily 90 capsule 3     gabapentin (NEURONTIN) 300 MG capsule Take 1-3 capsules (300-900 mg) by mouth 3 times daily 120 capsule 3     levocetirizine (XYZAL) 5 MG tablet Take 1 tablet (5 mg) by mouth every evening 90 tablet 3     medical cannabis inhalation (Patient's own supply.  Not a prescription) Inhale into the lungs 3 times daily as needed Reported on 3/28/2017       medroxyPROGESTERone (DEPO-PROVERA) 150 MG/ML IM injection Inject 1 mL (150 mg) into the muscle every 3 months 3 mL 3     NONFORMULARY Apply 30 mLs topically 4 times daily       pantoprazole (PROTONIX) 40 MG EC tablet Take 1 tablet (40 mg) by mouth daily Take 30-60 minutes before a meal. (Patient taking differently: Take 40 mg by mouth 2 times daily Take 30-60 minutes before a meal.) 30 tablet 3     prazosin (MINIPRESS) 1 MG capsule Take 1 mg by mouth At Bedtime  5     acetaminophen (TYLENOL) 500 MG tablet Take 2 tablets (1,000 mg) by mouth 3 times daily as needed for mild pain 100 tablet 0     cetirizine (ZYRTEC) 10 MG tablet Take 1 tablet (10 mg) by mouth daily (Patient not taking: Reported on 11/26/2018) 90 tablet 3     EPINEPHrine (EPIPEN/ADRENACLICK/OR ANY BX GENERIC EQUIV) 0.3 MG/0.3ML injection 2-pack Inject 0.3 mLs (0.3 mg) into the muscle once as needed for anaphylaxis 0.6 mL 3     nystatin  (MYCOSTATIN) cream nystatin 100,000 unit/gram topical cream       ondansetron (ZOFRAN ODT) 4 MG ODT tab Take 1 tablet (4 mg) by mouth every 6 hours as needed for nausea (Patient not taking: Reported on 1/21/2019) 30 tablet 5     polyethylene glycol (MIRALAX) powder Take 17 g (1 capful) by mouth daily (Patient not taking: Reported on 1/21/2019) 510 g 1     rizatriptan (MAXALT) 5 MG tablet Take 1-2 tablets (5-10 mg) by mouth at onset of headache for migraine May repeat in 2 hours. Max 6 tablets/24 hours. (Patient not taking: Reported on 1/21/2019) 18 tablet 3     rizatriptan (MAXALT-MLT) 5 MG ODT tab Take 1-2 tablets (5-10 mg) by mouth at onset of headache for migraine May repeat in 2 hours. Max 6 tablets/24 hours. (Patient not taking: Reported on 12/13/2018) 18 tablet 3     BP Readings from Last 3 Encounters:   01/21/19 105/73   01/12/19 110/78   01/04/19 122/80    Wt Readings from Last 3 Encounters:   01/21/19 79.8 kg (176 lb)   01/11/19 77.1 kg (170 lb)   01/04/19 77.6 kg (171 lb)                    ROS:  CONSTITUTIONAL: NEGATIVE for fever, chills, change in weight  RESP: NEGATIVE SOB  CV: NEGATIVE for chest pain, palpitations or peripheral edema    OBJECTIVE:     /73   Pulse 84   Temp 98.7  F (37.1  C)   Resp 16   Wt 79.8 kg (176 lb)   SpO2 98%   BMI 32.19 kg/m    Body mass index is 32.19 kg/m .  GENERAL: healthy, alert and no distress  EYES: Eyes grossly normal to inspection, PERRL and conjunctivae and sclerae normal  HENT: ear canals and TM's normal, nose and mouth without ulcers or lesions  RESP: lungs clear to auscultation - no rales, rhonchi or wheezes  CV: regular rate and rhythm, normal S1 S2, no S3 or S4, no murmur, click or rub, no peripheral edema and peripheral pulses strong  MS: No gross musculoskeletal defects noted. Spinal exam shows some tenderness over the bilateral lumbar paraspinal region.   SKIN: no suspicious lesions or rashes  NEURO: Normal strength and tone, mentation intact and  speech normal  PSYCH: mentation appears normal, affect normal/bright    Diagnostic Test Results:  none     ASSESSMENT/PLAN:       ICD-10-CM    1. Chronic bilateral low back pain with left-sided sciatica M54.42     G89.29    2. Chronic pain syndrome G89.4    3. Viral URI with cough J06.9     B97.89      Continues to have low back pain, this is chronic. She reports that she has not yet set up PT. She was unable to do this through [facility] due to them not having a interpretor service. She was given a new referral per MAIA at his OV 01/04/2019 and has not yet scheduled. She reports she uses Flexeril for pain nightly along with tylenol. States the flexeril makes her tired. She is here today to see if she can increase her Valium to twice daily. Based on her last OV it does not seem appropriate to do this at this time as I feel it is only enabling her pain medication needs and not ensuring that she does other methods of controlling her pain. She was very appropriate with me and respected this decision. She no longer is using narcotics. She notes she is motivated to do PT. I have had my staff help her in scheduling this appointment to include an interpretor. She will need to reschedule it if it does not work for her, this is a good start for her.     We discussed her cough that she has had, Lung sounds clear throughout and no fevers. Recommend humidification in the house along with plain mucinex. Return to clinic if symptoms worsen or do not improve.     Interpretor was present during the entire visit.       The patient indicates understanding of these issues and agrees with the plan.      A total of 40 minutes was spent with patient discussing plan and coordinating care.     Patient Instructions   -Schedule physical therapy at [facility]  - Continue to increase activity as tolerated as this will help with your pain and weight loss which can also help with your low back pain.   - Continue on current pain regimen, I would  recommend if the valium helps more with your pain then taking this at night when your pain is worse from the day.   - Follow up in 2 months with PCP.   - Schedule appointment with neurosurgery.       Acute URI:  - For your cough I recommend hot water with honey and lemon  - Humidification  - Plain Mucinex without the D.   - If you develop fevers, worsening symptoms or no improvement please return to clinic.     NATA Retana CNP, APRN CNP  [facility]

## 2019-01-21 ENCOUNTER — OFFICE VISIT (OUTPATIENT)
Dept: FAMILY MEDICINE | Facility: OTHER | Age: 29
End: 2019-01-21
Payer: MEDICARE

## 2019-01-21 VITALS
HEART RATE: 84 BPM | OXYGEN SATURATION: 98 % | BODY MASS INDEX: 32.19 KG/M2 | DIASTOLIC BLOOD PRESSURE: 73 MMHG | SYSTOLIC BLOOD PRESSURE: 105 MMHG | RESPIRATION RATE: 16 BRPM | TEMPERATURE: 98.7 F | WEIGHT: 176 LBS

## 2019-01-21 DIAGNOSIS — J06.9 VIRAL URI WITH COUGH: ICD-10-CM

## 2019-01-21 DIAGNOSIS — G89.29 CHRONIC BILATERAL LOW BACK PAIN WITH LEFT-SIDED SCIATICA: Primary | ICD-10-CM

## 2019-01-21 DIAGNOSIS — M54.42 CHRONIC BILATERAL LOW BACK PAIN WITH LEFT-SIDED SCIATICA: Primary | ICD-10-CM

## 2019-01-21 DIAGNOSIS — G89.4 CHRONIC PAIN SYNDROME: ICD-10-CM

## 2019-01-21 PROCEDURE — 99215 OFFICE O/P EST HI 40 MIN: CPT | Performed by: NURSE PRACTITIONER

## 2019-01-21 ASSESSMENT — ANXIETY QUESTIONNAIRES
1. FEELING NERVOUS, ANXIOUS, OR ON EDGE: SEVERAL DAYS
4. TROUBLE RELAXING: SEVERAL DAYS
6. BECOMING EASILY ANNOYED OR IRRITABLE: SEVERAL DAYS
7. FEELING AFRAID AS IF SOMETHING AWFUL MIGHT HAPPEN: NOT AT ALL
3. WORRYING TOO MUCH ABOUT DIFFERENT THINGS: NOT AT ALL
GAD7 TOTAL SCORE: 4
GAD7 TOTAL SCORE: 4
5. BEING SO RESTLESS THAT IT IS HARD TO SIT STILL: SEVERAL DAYS
7. FEELING AFRAID AS IF SOMETHING AWFUL MIGHT HAPPEN: NOT AT ALL
GAD7 TOTAL SCORE: 4
2. NOT BEING ABLE TO STOP OR CONTROL WORRYING: NOT AT ALL

## 2019-01-21 ASSESSMENT — PATIENT HEALTH QUESTIONNAIRE - PHQ9
SUM OF ALL RESPONSES TO PHQ QUESTIONS 1-9: 3
SUM OF ALL RESPONSES TO PHQ QUESTIONS 1-9: 3
10. IF YOU CHECKED OFF ANY PROBLEMS, HOW DIFFICULT HAVE THESE PROBLEMS MADE IT FOR YOU TO DO YOUR WORK, TAKE CARE OF THINGS AT HOME, OR GET ALONG WITH OTHER PEOPLE: NOT DIFFICULT AT ALL

## 2019-01-21 ASSESSMENT — PAIN SCALES - GENERAL: PAINLEVEL: MILD PAIN (2)

## 2019-01-21 NOTE — PATIENT INSTRUCTIONS
-Schedule physical therapy at [facility]  - Continue to increase activity as tolerated as this will help with your pain and weight loss which can also help with your low back pain.   - Continue on current pain regimen, I would recommend if the valium helps more with your pain then taking this at night when your pain is worse from the day.   - Follow up in 2 months with PCP.   - Schedule appointment with neurosurgery.       Acute URI:  - For your cough I recommend hot water with honey and lemon  - Humidification  - Plain Mucinex without the D.   - If you develop fevers, worsening symptoms or no improvement please return to clinic.     NATA Retana CNP

## 2019-01-22 ASSESSMENT — ANXIETY QUESTIONNAIRES: GAD7 TOTAL SCORE: 4

## 2019-01-22 ASSESSMENT — PATIENT HEALTH QUESTIONNAIRE - PHQ9: SUM OF ALL RESPONSES TO PHQ QUESTIONS 1-9: 3

## 2019-02-06 ENCOUNTER — HOSPITAL ENCOUNTER (EMERGENCY)
Facility: CLINIC | Age: 29
Discharge: HOME | End: 2019-02-06
Attending: STUDENT IN AN ORGANIZED HEALTH CARE EDUCATION/TRAINING PROGRAM | Admitting: STUDENT IN AN ORGANIZED HEALTH CARE EDUCATION/TRAINING PROGRAM
Payer: MEDICARE

## 2019-02-06 ENCOUNTER — APPOINTMENT (OUTPATIENT)
Dept: GENERAL RADIOLOGY | Facility: CLINIC | Age: 29
End: 2019-02-06
Attending: STUDENT IN AN ORGANIZED HEALTH CARE EDUCATION/TRAINING PROGRAM
Payer: MEDICARE

## 2019-02-06 ENCOUNTER — OFFICE VISIT (OUTPATIENT)
Dept: INTERPRETER SERVICES | Facility: CLINIC | Age: 29
End: 2019-02-06

## 2019-02-06 VITALS
SYSTOLIC BLOOD PRESSURE: 128 MMHG | RESPIRATION RATE: 16 BRPM | WEIGHT: 173 LBS | HEART RATE: 90 BPM | BODY MASS INDEX: 31.83 KG/M2 | HEIGHT: 62 IN | OXYGEN SATURATION: 98 % | DIASTOLIC BLOOD PRESSURE: 78 MMHG | TEMPERATURE: 97.7 F
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
## 2019-02-06 DIAGNOSIS — S70.02XA CONTUSION OF LEFT HIP, INITIAL ENCOUNTER: ICD-10-CM

## 2019-02-06 PROCEDURE — 99284 EMERGENCY DEPT VISIT MOD MDM: CPT | Mod: Z6 | Performed by: STUDENT IN AN ORGANIZED HEALTH CARE EDUCATION/TRAINING PROGRAM

## 2019-02-06 PROCEDURE — A9270 NON-COVERED ITEM OR SERVICE: HCPCS | Mod: GY | Performed by: STUDENT IN AN ORGANIZED HEALTH CARE EDUCATION/TRAINING PROGRAM

## 2019-02-06 PROCEDURE — 99284 EMERGENCY DEPT VISIT MOD MDM: CPT | Performed by: STUDENT IN AN ORGANIZED HEALTH CARE EDUCATION/TRAINING PROGRAM

## 2019-02-06 PROCEDURE — 73502 X-RAY EXAM HIP UNI 2-3 VIEWS: CPT | Mod: TC

## 2019-02-06 PROCEDURE — 25000132 ZZH RX MED GY IP 250 OP 250 PS 637: Mod: GY | Performed by: STUDENT IN AN ORGANIZED HEALTH CARE EDUCATION/TRAINING PROGRAM

## 2019-02-06 PROCEDURE — 72100 X-RAY EXAM L-S SPINE 2/3 VWS: CPT | Mod: TC

## 2019-02-06 RX ORDER — OXYCODONE HYDROCHLORIDE 5 MG/1
5 TABLET ORAL ONCE
Status: COMPLETED | OUTPATIENT
Start: 2019-02-06 | End: 2019-02-06

## 2019-02-06 RX ORDER — ACETAMINOPHEN 325 MG/1
650 TABLET ORAL ONCE
Status: COMPLETED | OUTPATIENT
Start: 2019-02-06 | End: 2019-02-06

## 2019-02-06 RX ORDER — HYDROCODONE BITARTRATE AND ACETAMINOPHEN 5; 325 MG/1; MG/1
1 TABLET ORAL ONCE
Status: DISCONTINUED | OUTPATIENT
Start: 2019-02-06 | End: 2019-02-06

## 2019-02-06 RX ADMIN — OXYCODONE HYDROCHLORIDE 5 MG: 5 TABLET ORAL at 13:35

## 2019-02-06 RX ADMIN — ACETAMINOPHEN 650 MG: 325 TABLET ORAL at 12:33

## 2019-02-06 ASSESSMENT — MIFFLIN-ST. JEOR: SCORE: 1462.97

## 2019-02-06 NOTE — ED PROVIDER NOTES
History     Chief Complaint   Patient presents with     Hip Pain     HPI  Katy Islas is a 28 year old female with past medical history which includes Crohn's colitis, anxiety, depression, bipolar disorder, pseudoseizures, tobacco use disorder, chronic low back pain, lumbar disc disease with radiculopathy, S/P lumbar fusion and sacroiliac joint fusion who presents for evaluation of left posterior hip pain after fall.  Patient is deaf and using interpretive services, explains that 1 hour prior to arrival to the department she slipped and fell outdoors while walking with her grandmother.  She describes falling backwards and landing on the posterior aspect of her left hip but also feels a painful tightness along the right anterior thigh and groin.  She was unable to get up without assistance secondary to pain.  Patient also has a dull achy low back pain but nonradiating.  They deny head impact injury, loss of consciousness, neck pain, seizure activity, abdominal pain, nausea/vomiting, weakness or sensory deficits.  No analgesic medication taken prior to arrival.    Allergies:  Allergies   Allergen Reactions     Ativan [Lorazepam] Hives     At the IV site     Baclofen      hives     Bees Hives, Swelling and Difficulty breathing     Caffeine      Contrast Dye      Hives,   Updated 5/10/2016 CT Contrast.     Iodine Hives     Methocarbamol Swelling     Metoclopramide      Other reaction(s): Tremors  LW Reaction: shaking/sweating     Midazolam      Other reaction(s): Agitation     Monosodium Glutamate      Morphine Other (See Comments)     Difficulty with urination     Nsaids Other (See Comments)     GI BLEED x2     Other (Do Not Use) Other (See Comments)     Xanaflex- pt becomes disoriented and loses bladder control     Reglan [Metoclopramide Hcl] Other (See Comments)     shaking     Soma [Carisoprodol] Visual Disturbance     Sleep walking     Tizanidine      Topamax Other (See Comments)     Topamax [Topiramate]  Nausea     Tingling  GI/Vomit     Tramadol      Severe Headache, Seizure Risk     Tylenol W/Codeine [Acetaminophen-Codeine] Nausea and Itching     Tylenol 3     Valium Other (See Comments)     Becomes aggressive and angry     Versed Other (See Comments)     Zolmitriptan      Makes face feel like its twitching     Droperidol Anxiety     Flu Virus Vaccine Rash      Arm swelling       Problem List:    Patient Active Problem List    Diagnosis Date Noted     Hypophosphatemia 11/01/2018     Priority: Medium     Patellar maltracking, right 09/05/2018     Priority: Medium     Right anterior knee pain 09/05/2018     Priority: Medium     Melanocytic nevus, unspecified location 07/23/2018     Priority: Medium     Dysplastic skin lesion 07/23/2018     Priority: Medium     Iliotibial band syndrome affecting lower leg, right 12/21/2017     Priority: Medium     Chondromalacia of right patellofemoral joint 12/21/2017     Priority: Medium     Postoperative pain 11/13/2017     Priority: Medium     Closed fracture of coccyx with nonunion, subsequent encounter 10/12/2017     Priority: Medium     Upper GI bleed - suspected 07/01/2017     Priority: Medium     Tobacco use disorder 07/01/2017     Priority: Medium     History of pseudoseizure 07/01/2017     Priority: Medium     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) 03/10/2017     Priority: Medium     Lumbar disc disease with radiculopathy 02/23/2017     Priority: Medium     S/P lumbar fusion 02/23/2017     Priority: Medium     Dr. YOVANI Millan, 2/23/17       Vitamin D deficiency 01/06/2017     Priority: Medium     Atypical mole 01/06/2017     Priority: Medium     Mild persistent asthma without complication 12/02/2016     Priority: Medium     Chronic bilateral low back pain with left-sided sciatica 12/02/2016     Priority: Medium     Bee sting allergy 09/16/2016     Priority: Medium     Gastroesophageal reflux disease without esophagitis 08/26/2016     Priority: Medium     Herpes  simplex virus infection 11/12/2015     Priority: Medium     Adjustment disorder with mixed anxiety and depressed mood 10/14/2015     Priority: Medium     Marijuana abuse 02/22/2015     Priority: Medium     Grand mal seizure disorder (H) 10/08/2013     Priority: Medium     Bipolar disorder (H) 01/31/2013     Priority: Medium     Problem list name updated by automated process. Provider to review       Nausea and vomiting 12/13/2012     Priority: Medium     Oral thrush 06/25/2012     Priority: Medium     Chronic pain 02/09/2012     Priority: Medium     Patient is followed by Aura Rosario PA-C for ongoing prescription of pain medication.  All refills should only be approved by this provider, or covering partner.    Medication(s): Norco    Maximum quantity per month: 70  Clinic visit frequency required: Q 2 months     Controlled substance agreement:   Discussed and signed 4/25/17    Encounter-Level CSA - 01/12/2015:                 Controlled Substance Agreement - Scan on 1/26/2015  9:14 AM : Controlled Medication Agreement-01/12/15 (below)            Pain Clinic evaluation in the past: Yes       Date/Location:   [facility], fall 2016    DIRE Total Score(s):    4/25/2017   Total Score 17       Last [facility] website verification:  done on 4/25/17 by LOVE Maki   [email or web address]           Anxiety 09/22/2011     Priority: Medium     Mild major depression (H) 08/29/2011     Priority: Medium     Crohn's colitis (H) 08/04/2009     Priority: Medium     Dysmenorrhea 05/11/2007     Priority: Medium     Benign neoplasm of skin of lower limb, including hip 03/16/2004     Priority: Medium     Migraine      Priority: Medium     Dr. Farrar - Neurology.  Now on Inderal.  Seems to be working.  Follow-up 9/08  Problem list name updated by automated process. Provider to review       Hearing loss      Priority: Medium     Congenital  Problem list name updated by automated process. Provider to review        Allergic rhinitis      Priority: Medium     Problem list name updated by automated process. Provider to review          Past Medical History:    Past Medical History:   Diagnosis Date     Abdominal pain 10/31- 11/4/2005     Allergic rhinitis, cause unspecified      Arthritis      C. 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     FUSION SPINE POSTERIOR MINIMALLY INVASIVE ONE LEVEL N/A 2/23/2017    Procedure: FUSION SPINE POSTERIOR MINIMALLY INVASIVE ONE LEVEL;  L4-5 Oblique Lateral Lumbar Interbody Fusion   Epidural steroid injection.   Transpedicular Bone marrow aspiration;  Surgeon: Jeniffer Eugene MD;  Location: [facility] OR     HC COLONOSCOPY THRU STOMA WITH BIOPSY  10/29/2003    Impression is that of normal appearing colonoscopy, without evidence of rectal bleeding.     HC COLONOSCOPY THRU STOMA, DIAGNOSTIC  10/00    normal     HC COLONOSCOPY THRU STOMA, DIAGNOSTIC  Oct 2009    Dr López- normal     HC EEG AWAKE AND SLEEP      abnormal     HC MRI BRAIN W/O CONTRAST  12/00    normal     HC REMOVAL GALLBLADDER  8/5/2009    [facility].     HC UGI ENDOSCOPY DIAG W BIOPSY  11/11/09    Normal esophagus     HC UGI ENDOSCOPY, SIMPLE EXAM  7/00, 10/00    mild chronic  esophagitis and duodenitis, neg H pylori      UGI ENDOSCOPY, SIMPLE EXAM  01/20/2005    Esophagogastroduodenoscopy, colonoscopy with biopsies.  [facility] UGI ENDOSCOPY, SIMPLE EXAM  7/1/2009    [facility] [place]      UGI ENDOSCOPY, SIMPLE EXAM  11/11/2009    attempted upper GI, pt. could not tolerate procedure:[facility]     ORTHOPEDIC SURGERY  October 19,2011    diskectomy L4-L5       Family History:    Family History   Problem Relation Age of Onset     Gastrointestinal Disease Brother         severe Crohn's     Neurologic Disorder Brother         Seizures post head injury     Depression Brother      Substance Abuse Brother      Genitourinary Problems Father         kidney stones     Diabetes Father      Heart Disease Father         Open heart surgery     Breast Cancer Maternal Grandmother      Parkinsonism Maternal Grandmother      Cerebrovascular Disease Paternal Grandmother      Cancer Maternal Grandfather         Lung     Cardiovascular Paternal Grandfather         Heart Attack     Substance Abuse Mother      Lung Cancer Paternal Uncle      Cancer Paternal Uncle      Lung Cancer Maternal Uncle        Social History:  Marital Status:  Single [1]  Social History     Tobacco Use     Smoking status: Current Every Day Smoker     Packs/day: 0.50     Types: Cigarettes     Smokeless tobacco: Never Used   Substance Use Topics     Alcohol use: Yes     Comment: Not since last July     Drug use: Yes     Types: Marijuana     Comment: Medical Marijuana currently        Medications:      acetaminophen (TYLENOL) 500 MG tablet   albuterol (2.5 MG/3ML) 0.083% neb solution   albuterol (PROAIR HFA/PROVENTIL HFA/VENTOLIN HFA) 108 (90 Base) MCG/ACT Inhaler   ARIPiprazole (ABILIFY) 15 MG tablet   busPIRone (BUSPAR) 15 MG tablet   cetirizine (ZYRTEC) 10 MG tablet   clonazePAM (KLONOPIN) 0.5 MG tablet   cyclobenzaprine (FLEXERIL) 10 MG tablet   diazepam (VALIUM) 10 MG tablet  "  dicyclomine (BENTYL) 20 MG tablet   DULoxetine (CYMBALTA) 60 MG EC capsule   EPINEPHrine (EPIPEN/ADRENACLICK/OR ANY BX GENERIC EQUIV) 0.3 MG/0.3ML injection 2-pack   gabapentin (NEURONTIN) 300 MG capsule   levocetirizine (XYZAL) 5 MG tablet   medical cannabis inhalation (Patient's own supply.  Not a prescription)   medroxyPROGESTERone (DEPO-PROVERA) 150 MG/ML IM injection   NONFORMULARY   nystatin (MYCOSTATIN) cream   ondansetron (ZOFRAN ODT) 4 MG ODT tab   pantoprazole (PROTONIX) 40 MG EC tablet   polyethylene glycol (MIRALAX) powder   prazosin (MINIPRESS) 1 MG capsule   rizatriptan (MAXALT) 5 MG tablet   rizatriptan (MAXALT-MLT) 5 MG ODT tab         Review of Systems  Constitutional:  Negative for fever or recent illness.  Cardiovascular:  Negative for chest pain.  Respiratory:  Negative for shortness of breath.  Gastrointestinal:  Negative for abdominal pain, nausea or vomiting.  Genitourinary: Negative for incontinence.  Musculoskeletal: Positive for left posterior hip pain and right anterior thigh pain.  Dull lumbar back pain.  Neurological:  Negative for weakness, or sensory deficits.    All others reviewed and are negative.      Physical Exam   BP: 128/88  Pulse: 74  Temp: 97.7  F (36.5  C)  Resp: 16  Height: 157.5 cm (5' 2\")  Weight: 78.5 kg (173 lb)  SpO2: 100 %      Physical Exam  Constitutional:  Well developed, well nourished.  Appears nontoxic and in no acute distress.  HENT:  Normocephalic and atraumatic.  Symmetric in appearance.  Eyes:  Conjunctivae are normal.  Neck:  Neck supple.  Cardiovascular:  No cyanosis.  Respiratory:  Effort normal without sign of respiratory distress.    Gastrointestinal:  Soft nondistended abdomen.  Nontender and without guarding.  No rigidity or rebound tenderness.    Genitourinary:  Noncontributory.   Musculoskeletal:  No step-offs noted and no tenderness to palpation of midline thoracic or lumbosacral vertebra.  No lateral hip tenderness or pelvic instability.  " Tenderness of left posterior hip and gluteus region.  Hip flexion, knee extension, dorsiflexion/extensor halliucis (L5), and plantar flexion (S1) intact and equal bilaterally.  Sensation of medial leg, dorsum of foot, and planter aspect of foot are intact bilaterally and without evidence of saddle anesthesia.   Neurological:  Patient is alert.  Skin:  Skin is warm and dry.  Psychiatric:  Normal mood and affect.      ED Course        Procedures               Critical Care time:  none               Results for orders placed or performed during the hospital encounter of 02/06/19 (from the past 24 hour(s))   XR Pelvis w Hip Left 1 View    Narrative    PELVIS WITH LEFT HIP LATERAL TWO VIEWS  2/6/2019 12:57 PM     HISTORY: Right pelvic and left hip pain after fall 1 hour prior to  arrival    COMPARISON: 12/10/2018 abdomen x-ray      Impression    IMPRESSION:  Postop changes lower lumbar spine and left sacroiliac  joint redemonstrated. Hardware appears intact. No acute appearing  fracture or dislocation. Prominent stool right colon.    NICCI JORDAN MD   Lumbar spine XR, 2-3 views    Narrative    LUMBAR SPINE THREE VIEWS  2/6/2019 12:58 PM     HISTORY: Fall. Low back pain. Left hip pain.    COMPARISON: 6/19/2017.      Impression    IMPRESSION:  Postoperative changes of posterior marilyn and pedicle screw  fusion at L4-5 is again noted. There has been interval placement of  two surgical screws across the left sacroiliac joint. No evidence for  acute fracture or gross malalignment in the lumbar spine.      *Note: Due to a large number of results and/or encounters for the requested time period, some results have not been displayed. A complete set of results can be found in Results Review.       Medications   HYDROcodone-acetaminophen (NORCO) 5-325 MG per tablet 1 tablet (not administered)   acetaminophen (TYLENOL) tablet 650 mg (650 mg Oral Given 2/6/19 1233)       Assessments & Plan (with Medical Decision Making)   Katy BRAVO  Janel is a 28 year old female who presents to the emergency department for complaint of left posterior hip pain and right anterior thigh and groin pain after his fall occurring just prior to arrival.  Based on her symptoms and the clinical examination, there is no evidence to suggest pelvic instability, lower extremity deformity, skin injury, cauda equina syndrome, or neurovascular deficits.  Radiographs were independently reviewed and without identifiable fracture or other acute bony abnormality.  Radiologist read is consistent.  Patient admitted to some mild improvement with acetaminophen but requested stronger for pain relief.  She will be given a single dose of oxycodone in the department and instructed to discuss symptoms and management with primary care provider.  Both she and accompanying grandmother seem comfortable discharge plan including return instructions for change or developing symptoms.        Disclaimer:  This note consists of symbols derived from keyboarding, dictation, and/or voice recognition software.  As a result, there may be errors in the script that have gone undetected.  Please consider this when interpreting information found in the chart.         I have reviewed the nursing notes.    I have reviewed the findings, diagnosis, plan and need for follow up with the patient.          Medication List      ASK your doctor about these medications    * fluconazole 150 MG tablet  Commonly known as:  DIFLUCAN  150 mg, Oral, ONCE, May repeat after 3 days if symptoms persist  Ask about: Should I take this medication?     * fluconazole 150 MG tablet  Commonly known as:  DIFLUCAN  150 mg, Oral, ONCE  Ask about: Should I take this medication?     * lidocaine (viscous) 15 mL, alum & mag hydroxide-simethicone 15 mL GI Cocktail  30 mLs, Oral, ONCE  Ask about: Should I take this medication?     * lidocaine (viscous) 15 mL, alum & mag hydroxide-simethicone 15 mL GI Cocktail  30 mLs, Oral, ONCE  Ask about:  Should I take this medication?     * lidocaine (viscous) 15 mL, alum & mag hydroxide-simethicone 15 mL GI Cocktail  30 mLs, Oral, ONCE  Ask about: Should I take this medication?     * lidocaine (viscous) 15 mL, alum & mag hydroxide-simethicone 15 mL GI Cocktail  30 mLs, Oral, ONCE  Ask about: Should I take this medication?     nystatin [id number] UNIT/GM external cream  Commonly known as:  MYCOSTATIN  Topical, 3 TIMES DAILY  Ask about: Should I take this medication?     * ondansetron 4 MG tablet  Commonly known as:  ZOFRAN  4 mg, Oral, ONCE  Ask about: Should I take this medication?     * ondansetron 2 MG/ML Soln injection  Commonly known as:  ZOFRAN  8 mg, Intravenous, ONCE  Ask about: Should I take this medication?     * ondansetron 8 MG tablet  Commonly known as:  ZOFRAN  8 mg, Oral, ONCE  Ask about: Should I take this medication?     promethazine 6.25 MG/5ML syrup  Commonly known as:  PHENERGAN  12.5 mg, Oral, 4 TIMES DAILY PRN  Ask about: Should I take this medication?     * triamcinolone 40 MG/ML injection  Commonly known as:  KENALOG  40 mg, INTRA-ARTICULAR, ONCE  Ask about: Should I take this medication?     * triamcinolone 40 MG/ML injection  Commonly known as:  KENALOG  40 mg, INTRA-ARTICULAR, ONCE  Ask about: Should I take this medication?         * This list has 11 medication(s) that are the same as other medications prescribed for you. Read the directions carefully, and ask your doctor or other care provider to review them with you.                Final diagnoses:   Contusion of left hip, initial encounter   Fall, initial encounter       2/6/2019   [facility] EMERGENCY DEPARTMENT     Aldair Rodriguez, DO  02/06/19 9492

## 2019-02-06 NOTE — ED AVS SNAPSHOT
[facility] Emergency Department  [address] [place] [address]  Phone:  [phone]  Fax:  [phone]                                    Katy Islas   MRN: [id number]    Department:  [facility] Emergency Department   Date of Visit:  2/6/2019           After Visit Summary Signature Page    I have received my discharge instructions, and my questions have been answered. I have discussed any challenges I see with this plan with the nurse or doctor.    ..........................................................................................................................................  Patient/Patient Representative Signature      ..........................................................................................................................................  Patient Representative Print Name and Relationship to Patient    ..................................................               ................................................  Date                                   Time    ..........................................................................................................................................  Reviewed by Signature/Title    ...................................................              ..............................................  Date                                               Time          22EPIC Rev 08/18

## 2019-02-06 NOTE — ED TRIAGE NOTES
Pt deaf. Has interperter with her. Pt was here for PT and slipped on ice on the side walk prior to coming to the ED. Did not hit head. No LOC. No neck pain. CO pain in left hip area where she has surgery in OCT 2018. Also pain right groin area.

## 2019-02-07 ENCOUNTER — PATIENT OUTREACH (OUTPATIENT)
Dept: CARE COORDINATION | Facility: CLINIC | Age: 29
End: 2019-02-07

## 2019-02-07 NOTE — PROGRESS NOTES
Clinic Care Coordination Contact  [facility]/Voicemail    Patient was seen at [facility] ER for hip contusion.      Clinical Data: Care Coordinator Outreach  Outreach attempted x 1.  Left message on voicemail with call back information and requested return call.  Plan: Care Coordinator mailed out care coordination introduction letter on 12/10/18. Care Coordinator will try to reach patient again in 1-2 business days.    RADHA Otto RN Clinic Care Coordinator   [place], [place], [place]  Phone: [phone]

## 2019-02-08 ENCOUNTER — MYC MEDICAL ADVICE (OUTPATIENT)
Dept: FAMILY MEDICINE | Facility: OTHER | Age: 29
End: 2019-02-08

## 2019-02-08 ASSESSMENT — ACTIVITIES OF DAILY LIVING (ADL): DEPENDENT_IADLS:: INDEPENDENT

## 2019-02-08 NOTE — TELEPHONE ENCOUNTER
[employer] message sent to patient - if not read by the end of the day, we will have to call to reschedule her.  Abimbola Castro, CMA

## 2019-02-08 NOTE — PROGRESS NOTES
Clinic Care Coordination Contact  [facility]/Voicemail    Referral Source: IP Report  Clinical Data: Care Coordinator Outreach  Outreach attempted x 2.  Left message on voicemail with call back information and requested return call.    Plan:  1. Care Coordinator will do no further outreaches at this time.    EDYTA OttoN RN Clinic Care Coordinator   [place], [place], [place]  Phone: [phone]

## 2019-02-11 ENCOUNTER — HOSPITAL ENCOUNTER (EMERGENCY)
Facility: CLINIC | Age: 29
Discharge: HOME | End: 2019-02-12
Attending: NURSE PRACTITIONER | Admitting: NURSE PRACTITIONER
Payer: MEDICARE

## 2019-02-11 DIAGNOSIS — R10.84 ABDOMINAL PAIN, GENERALIZED: ICD-10-CM

## 2019-02-11 DIAGNOSIS — N39.0 URINARY TRACT INFECTION IN FEMALE: ICD-10-CM

## 2019-02-11 LAB
ALBUMIN UR-MCNC: NEGATIVE MG/DL
ANION GAP SERPL CALCULATED.3IONS-SCNC: 7 MMOL/L (ref 3–14)
APPEARANCE UR: CLEAR
BACTERIA #/AREA URNS HPF: ABNORMAL /HPF
BASOPHILS # BLD AUTO: 0 10E9/L (ref 0–0.2)
BASOPHILS NFR BLD AUTO: 0.3 %
BILIRUB UR QL STRIP: NEGATIVE
BUN SERPL-MCNC: 17 MG/DL (ref 7–30)
CALCIUM SERPL-MCNC: 8.9 MG/DL (ref 8.5–10.1)
CHLORIDE SERPL-SCNC: 110 MMOL/L (ref 94–109)
CO2 SERPL-SCNC: 27 MMOL/L (ref 20–32)
COLOR UR AUTO: YELLOW
CREAT SERPL-MCNC: 0.85 MG/DL (ref 0.52–1.04)
DIFFERENTIAL METHOD BLD: NORMAL
EOSINOPHIL NFR BLD AUTO: 0.9 %
ERYTHROCYTE [DISTWIDTH] IN BLOOD BY AUTOMATED COUNT: 12.3 % (ref 10–15)
GFR SERPL CREATININE-BSD FRML MDRD: >90 ML/MIN/{1.73_M2}
GLUCOSE SERPL-MCNC: 106 MG/DL (ref 70–99)
GLUCOSE UR STRIP-MCNC: NEGATIVE MG/DL
HCT VFR BLD AUTO: 40.2 % (ref 35–47)
HGB BLD-MCNC: 13.8 G/DL (ref 11.7–15.7)
HGB UR QL STRIP: NEGATIVE
IMM GRANULOCYTES # BLD: 0 10E9/L (ref 0–0.4)
IMM GRANULOCYTES NFR BLD: 0.3 %
KETONES UR STRIP-MCNC: NEGATIVE MG/DL
LEUKOCYTE ESTERASE UR QL STRIP: ABNORMAL
LYMPHOCYTES # BLD AUTO: 1.6 10E9/L (ref 0.8–5.3)
LYMPHOCYTES NFR BLD AUTO: 21 %
MCH RBC QN AUTO: 32.6 PG (ref 26.5–33)
MCHC RBC AUTO-ENTMCNC: 34.3 G/DL (ref 31.5–36.5)
MCV RBC AUTO: 95 FL (ref 78–100)
MONOCYTES # BLD AUTO: 0.6 10E9/L (ref 0–1.3)
MONOCYTES NFR BLD AUTO: 8.2 %
NEUTROPHILS # BLD AUTO: 5.3 10E9/L (ref 1.6–8.3)
NEUTROPHILS NFR BLD AUTO: 69.3 %
NITRATE UR QL: POSITIVE
NRBC # BLD AUTO: 0 10*3/UL
NRBC BLD AUTO-RTO: 0 /100
PH UR STRIP: 6 PH (ref 5–7)
PLATELET # BLD AUTO: 286 10E9/L (ref 150–450)
POTASSIUM SERPL-SCNC: 3.6 MMOL/L (ref 3.4–5.3)
RBC # BLD AUTO: 4.23 10E12/L (ref 3.8–5.2)
RBC #/AREA URNS AUTO: 1 /HPF (ref 0–2)
SODIUM SERPL-SCNC: 144 MMOL/L (ref 133–144)
SOURCE: ABNORMAL
SP GR UR STRIP: 1.01 (ref 1–1.03)
SQUAMOUS #/AREA URNS AUTO: <1 /HPF (ref 0–1)
UROBILINOGEN UR STRIP-MCNC: 0 MG/DL (ref 0–2)
WBC # BLD AUTO: 7.7 10E9/L (ref 4–11)
WBC #/AREA URNS AUTO: 15 /HPF (ref 0–5)

## 2019-02-11 PROCEDURE — 96361 HYDRATE IV INFUSION ADD-ON: CPT | Performed by: NURSE PRACTITIONER

## 2019-02-11 PROCEDURE — 81001 URINALYSIS AUTO W/SCOPE: CPT | Performed by: NURSE PRACTITIONER

## 2019-02-11 PROCEDURE — 87186 SC STD MICRODIL/AGAR DIL: CPT | Performed by: NURSE PRACTITIONER

## 2019-02-11 PROCEDURE — 87088 URINE BACTERIA CULTURE: CPT | Performed by: NURSE PRACTITIONER

## 2019-02-11 PROCEDURE — 96375 TX/PRO/DX INJ NEW DRUG ADDON: CPT | Performed by: NURSE PRACTITIONER

## 2019-02-11 PROCEDURE — 99285 EMERGENCY DEPT VISIT HI MDM: CPT | Mod: 25 | Performed by: NURSE PRACTITIONER

## 2019-02-11 PROCEDURE — 87086 URINE CULTURE/COLONY COUNT: CPT | Performed by: NURSE PRACTITIONER

## 2019-02-11 PROCEDURE — 85025 COMPLETE CBC W/AUTO DIFF WBC: CPT | Performed by: NURSE PRACTITIONER

## 2019-02-11 PROCEDURE — 80048 BASIC METABOLIC PNL TOTAL CA: CPT | Performed by: NURSE PRACTITIONER

## 2019-02-11 PROCEDURE — 96374 THER/PROPH/DIAG INJ IV PUSH: CPT | Performed by: NURSE PRACTITIONER

## 2019-02-11 PROCEDURE — 99285 EMERGENCY DEPT VISIT HI MDM: CPT | Mod: Z6 | Performed by: NURSE PRACTITIONER

## 2019-02-11 PROCEDURE — 25000128 H RX IP 250 OP 636: Performed by: NURSE PRACTITIONER

## 2019-02-11 RX ORDER — DIPHENHYDRAMINE HYDROCHLORIDE 50 MG/ML
25 INJECTION INTRAMUSCULAR; INTRAVENOUS ONCE
Status: COMPLETED | OUTPATIENT
Start: 2019-02-11 | End: 2019-02-11

## 2019-02-11 RX ORDER — HYDROMORPHONE HYDROCHLORIDE 1 MG/ML
0.5 INJECTION, SOLUTION INTRAMUSCULAR; INTRAVENOUS; SUBCUTANEOUS ONCE
Status: COMPLETED | OUTPATIENT
Start: 2019-02-11 | End: 2019-02-11

## 2019-02-11 RX ORDER — PROMETHAZINE HYDROCHLORIDE 25 MG/ML
25 INJECTION, SOLUTION INTRAMUSCULAR; INTRAVENOUS ONCE
Status: COMPLETED | OUTPATIENT
Start: 2019-02-11 | End: 2019-02-11

## 2019-02-11 RX ORDER — ONDANSETRON 2 MG/ML
4 INJECTION INTRAMUSCULAR; INTRAVENOUS EVERY 30 MIN PRN
Status: DISCONTINUED | OUTPATIENT
Start: 2019-02-11 | End: 2019-02-12 | Stop reason: HOSPADM

## 2019-02-11 RX ADMIN — PROMETHAZINE HYDROCHLORIDE 25 MG: 25 INJECTION INTRAMUSCULAR; INTRAVENOUS at 21:55

## 2019-02-11 RX ADMIN — Medication 0.5 MG: at 21:49

## 2019-02-11 RX ADMIN — DIPHENHYDRAMINE HYDROCHLORIDE 25 MG: 50 INJECTION, SOLUTION INTRAMUSCULAR; INTRAVENOUS at 21:47

## 2019-02-11 RX ADMIN — SODIUM CHLORIDE 1000 ML: 9 INJECTION, SOLUTION INTRAVENOUS at 21:46

## 2019-02-11 ASSESSMENT — ENCOUNTER SYMPTOMS
ABDOMINAL PAIN: 1
VOMITING: 1
NAUSEA: 1
DIARRHEA: 1

## 2019-02-11 NOTE — ED AVS SNAPSHOT
[facility] Emergency Department  [address] [place] [address]  Phone:  [phone]  Fax:  [phone]                                    Katy Islas   MRN: [id number]    Department:  [facility] Emergency Department   Date of Visit:  2/11/2019           After Visit Summary Signature Page    I have received my discharge instructions, and my questions have been answered. I have discussed any challenges I see with this plan with the nurse or doctor.    ..........................................................................................................................................  Patient/Patient Representative Signature      ..........................................................................................................................................  Patient Representative Print Name and Relationship to Patient    ..................................................               ................................................  Date                                   Time    ..........................................................................................................................................  Reviewed by Signature/Title    ...................................................              ..............................................  Date                                               Time          22EPIC Rev 08/18

## 2019-02-12 ENCOUNTER — TELEPHONE (OUTPATIENT)
Dept: FAMILY MEDICINE | Facility: OTHER | Age: 29
End: 2019-02-12

## 2019-02-12 ENCOUNTER — HOSPITAL ENCOUNTER (EMERGENCY)
Facility: CLINIC | Age: 29
Discharge: HOME | End: 2019-02-12
Attending: EMERGENCY MEDICINE | Admitting: EMERGENCY MEDICINE
Payer: MEDICARE

## 2019-02-12 ENCOUNTER — PATIENT OUTREACH (OUTPATIENT)
Dept: CARE COORDINATION | Facility: CLINIC | Age: 29
End: 2019-02-12

## 2019-02-12 VITALS
RESPIRATION RATE: 20 BRPM | SYSTOLIC BLOOD PRESSURE: 105 MMHG | HEART RATE: 93 BPM | TEMPERATURE: 99.8 F | WEIGHT: 171 LBS | BODY MASS INDEX: 31.28 KG/M2 | OXYGEN SATURATION: 96 % | DIASTOLIC BLOOD PRESSURE: 64 MMHG

## 2019-02-12 VITALS
TEMPERATURE: 98.9 F | DIASTOLIC BLOOD PRESSURE: 86 MMHG | WEIGHT: 179 LBS | OXYGEN SATURATION: 98 % | SYSTOLIC BLOOD PRESSURE: 137 MMHG | RESPIRATION RATE: 16 BRPM | BODY MASS INDEX: 32.74 KG/M2

## 2019-02-12 DIAGNOSIS — R11.10 VOMITING AND DIARRHEA: ICD-10-CM

## 2019-02-12 DIAGNOSIS — R19.7 VOMITING AND DIARRHEA: ICD-10-CM

## 2019-02-12 PROCEDURE — 99283 EMERGENCY DEPT VISIT LOW MDM: CPT | Mod: Z6 | Performed by: EMERGENCY MEDICINE

## 2019-02-12 PROCEDURE — 99282 EMERGENCY DEPT VISIT SF MDM: CPT | Performed by: EMERGENCY MEDICINE

## 2019-02-12 ASSESSMENT — ACTIVITIES OF DAILY LIVING (ADL): DEPENDENT_IADLS:: INDEPENDENT

## 2019-02-12 NOTE — LETTER
Health Care Home - Access Care Plan    About Me  Patient Name:  Katy Islas    YOB: 1990  Age:                             28 year old   Joseph MRN:            [id number] Telephone Information:   Home Phone [phone]   Mobile [phone]       Address:    [address] [place] [address] Email address:  [email or web address]      Emergency Contact(s)  Name Relationship Lgl Grd Work Phone Home Phone Mobile Phone   1. JOSEPH ISLAS Mother No none [phone]   2. NO 2ND CONTACT*                  Health Maintenance: Routine Health maintenance Reviewed: Not assessed    My Access Plan  Medical Emergency 911   Questions or concerns during clinic hours Primary Clinic Line, I will call the clinic directly: [facility] - [phone]   24 Hour Appointment Line [phone] or  [phone]) (toll free)   24 Hour Nurse Line [phone] (toll free)   Questions or concerns outside clinic hours 24 Hour Appointment Line, I will call the after-hours on-call line:   [facility] [phone] or [phone]) (toll-free)   Preferred Urgent Care     Preferred Hospital [facility]  [phone]   Preferred Pharmacy [facility] [place], [place] - [address]      Behavioral Health Crisis Line The National Suicide Prevention Lifeline at 1-477.279.5660 or 184     My Care Team Members  Patient Care Team       Relationship Specialty Notifications Start End    Aura Rosario PA-C PCP - General Physician Assistant - Medical  9/19/16     Phone: [phone] Fax: [phone]         [address] [facility] [address]    Aura Rosario PA-C PCP - Assigned PCP   9/25/16     Phone: [phone] Fax: [phone]         [address] [facility] [address]    Natanael Guaman MD  Internal Medicine  8/25/11      Phone: [phone] Fax: [phone]         XXX RESIGNED  MAIN [facility] [address]    Chrissie Pfeiffer     2/2/18     Kenzie is attempting to reach Patient to assess for case management     Phone: [phone]         Tanika Eid, RN Clinic Care Coordinator Primary Care - CC Admissions 2/12/19     Phone: [phone] Fax: [phone]               My Medical and Care Information  Problem List   Patient Active Problem List   Diagnosis     Hearing loss     Allergic rhinitis     Migraine     Benign neoplasm of skin of lower limb, including hip     Dysmenorrhea     Crohn's colitis (H)     Mild major depression (H)     Anxiety     Chronic pain     Oral thrush     Nausea and vomiting     Grand mal seizure disorder (H)     Bipolar disorder (H)     Marijuana abuse     Adjustment disorder with mixed anxiety and depressed mood     Herpes simplex virus infection     Gastroesophageal reflux disease without esophagitis     Bee sting allergy     Mild persistent asthma without complication     Chronic bilateral low back pain with left-sided sciatica     Vitamin D deficiency     Atypical mole     Lumbar disc disease with radiculopathy     S/P lumbar fusion     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)     Upper GI bleed - suspected     Tobacco use disorder     History of pseudoseizure     Closed fracture of coccyx with nonunion, subsequent encounter     Postoperative pain     Iliotibial band syndrome affecting lower leg, right     Chondromalacia of right patellofemoral joint     Melanocytic nevus, unspecified location     Dysplastic skin lesion     Patellar maltracking, right     Right anterior knee pain     Hypophosphatemia      Current Medications and Allergies:  See printed Medication Report

## 2019-02-12 NOTE — TELEPHONE ENCOUNTER
Reason for call:  Patient reporting a symptom    Symptom or request: kidney infection, vomiting, tired, weak, fever    Duration (how long have symptoms been present): 8 days     Have you been treated for this before? Yes    Additional comments: pt states seen at [place] urgent care last week Monday and dx with kidney infection and put on medication. Pt states symptoms not improving has fever of 99.8, vomited bile yesterday bluish green color and having some tiredness and weakness. Please advise     Phone Number patient can be reached at:  Home number on file [phone] (home)    Best Time:  ANY    Can we leave a detailed message on this number:  YES    Call taken on 2/12/2019 at 3:03 PM by Faina Pinon

## 2019-02-12 NOTE — ED NOTES
Writer informed security of pt waiting in the lobby.  Security notified writer about 5 minutes after pt left her room that she drove herself out of the parking lot.

## 2019-02-12 NOTE — ED TRIAGE NOTES
On antibiotics for UTI on 2/04.  Not feeling any better with vomiting and back pain.  Seen in ED here yesterday.

## 2019-02-12 NOTE — ED PROVIDER NOTES
History     Chief Complaint   Patient presents with     Vomiting     The history is provided by the patient.     Katy Islas is a 28 year old female who presents to the emergency department for vomiting. She states she has had nausea, vomiting, diarrhea and abdominal pain for a few days. She states this is a recurrent issue, however, she usually doesn't throw up bile. She states her abdominal pain is in the epigastric region and radiates to her left side. She states she is very nauseous right now. Patient reports she vomited 5 times today, had diarrhea twice and denies any blood in her stool. The last time she vomited was around 1500. She took some Zofran at home with no relief. She states the Promethazine works best for her, however, she is out at home. When here in the ED, she usually gets Benadryl, Promethazine and Dilaudid. Her last Tylenol dose was at 1445. She has a history of Crohn's and has several CT Scan which have not shown any inflammation. She states she is waiting for GI in [facility] to call her for an appointment.     Allergies:  Allergies   Allergen Reactions     Ativan [Lorazepam] Hives     At the IV site     Baclofen      hives     Bees Hives, Swelling and Difficulty breathing     Caffeine      Contrast Dye      Hives,   Updated 5/10/2016 CT Contrast.     Iodine Hives     Methocarbamol Swelling     Metoclopramide      Other reaction(s): Tremors  LW Reaction: shaking/sweating     Midazolam      Other reaction(s): Agitation     Monosodium Glutamate      Morphine Other (See Comments)     Difficulty with urination     Nsaids Other (See Comments)     GI BLEED x2     Other (Do Not Use) Other (See Comments)     Xanaflex- pt becomes disoriented and loses bladder control     Reglan [Metoclopramide Hcl] Other (See Comments)     shaking     Soma [Carisoprodol] Visual Disturbance     Sleep walking     Tizanidine      Topamax Other (See Comments)     Topamax [Topiramate] Nausea     Tingling  GI/Vomit      Tramadol      Severe Headache, Seizure Risk     Tylenol W/Codeine [Acetaminophen-Codeine] Nausea and Itching     Tylenol 3     Valium Other (See Comments)     Becomes aggressive and angry     Versed Other (See Comments)     Zolmitriptan      Makes face feel like its twitching     Droperidol Anxiety     Flu Virus Vaccine Rash      Arm swelling       Problem List:    Patient Active Problem List    Diagnosis Date Noted     Hypophosphatemia 11/01/2018     Priority: Medium     Patellar maltracking, right 09/05/2018     Priority: Medium     Right anterior knee pain 09/05/2018     Priority: Medium     Melanocytic nevus, unspecified location 07/23/2018     Priority: Medium     Dysplastic skin lesion 07/23/2018     Priority: Medium     Iliotibial band syndrome affecting lower leg, right 12/21/2017     Priority: Medium     Chondromalacia of right patellofemoral joint 12/21/2017     Priority: Medium     Postoperative pain 11/13/2017     Priority: Medium     Closed fracture of coccyx with nonunion, subsequent encounter 10/12/2017     Priority: Medium     Upper GI bleed - suspected 07/01/2017     Priority: Medium     Tobacco use disorder 07/01/2017     Priority: Medium     History of pseudoseizure 07/01/2017     Priority: Medium     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) 03/10/2017     Priority: Medium     Lumbar disc disease with radiculopathy 02/23/2017     Priority: Medium     S/P lumbar fusion 02/23/2017     Priority: Medium     Dr. YOVANI Millan, 2/23/17       Vitamin D deficiency 01/06/2017     Priority: Medium     Atypical mole 01/06/2017     Priority: Medium     Mild persistent asthma without complication 12/02/2016     Priority: Medium     Chronic bilateral low back pain with left-sided sciatica 12/02/2016     Priority: Medium     Bee sting allergy 09/16/2016     Priority: Medium     Gastroesophageal reflux disease without esophagitis 08/26/2016     Priority: Medium     Herpes simplex virus infection 11/12/2015      Priority: Medium     Adjustment disorder with mixed anxiety and depressed mood 10/14/2015     Priority: Medium     Marijuana abuse 02/22/2015     Priority: Medium     Grand mal seizure disorder (H) 10/08/2013     Priority: Medium     Bipolar disorder (H) 01/31/2013     Priority: Medium     Problem list name updated by automated process. Provider to review       Nausea and vomiting 12/13/2012     Priority: Medium     Oral thrush 06/25/2012     Priority: Medium     Chronic pain 02/09/2012     Priority: Medium     Patient is followed by Aura Rosario PA-C for ongoing prescription of pain medication.  All refills should only be approved by this provider, or covering partner.    Medication(s): Norco    Maximum quantity per month: 70  Clinic visit frequency required: Q 2 months     Controlled substance agreement:   Discussed and signed 4/25/17    Encounter-Level CSA - 01/12/2015:                 Controlled Substance Agreement - Scan on 1/26/2015  9:14 AM : Controlled Medication Agreement-01/12/15 (below)            Pain Clinic evaluation in the past: Yes       Date/Location:   [facility], fall 2016    DIRE Total Score(s):    4/25/2017   Total Score 17       Last [facility] website verification:  done on 4/25/17 by LOVE Maki   [email or web address]           Anxiety 09/22/2011     Priority: Medium     Mild major depression (H) 08/29/2011     Priority: Medium     Crohn's colitis (H) 08/04/2009     Priority: Medium     Dysmenorrhea 05/11/2007     Priority: Medium     Benign neoplasm of skin of lower limb, including hip 03/16/2004     Priority: Medium     Migraine      Priority: Medium     Dr. Farrar - Neurology.  Now on Inderal.  Seems to be working.  Follow-up 9/08  Problem list name updated by automated process. Provider to review       Hearing loss      Priority: Medium     Congenital  Problem list name updated by automated process. Provider to review       Allergic rhinitis       Priority: Medium     Problem list name updated by automated process. Provider to review          Past Medical History:    Past Medical History:   Diagnosis Date     Abdominal pain 10/31- 11/4/2005     Allergic rhinitis, cause unspecified      Arthritis      C. 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     FUSION SPINE POSTERIOR MINIMALLY INVASIVE ONE LEVEL N/A 2/23/2017    Procedure: FUSION SPINE POSTERIOR MINIMALLY INVASIVE ONE LEVEL;  L4-5 Oblique Lateral Lumbar Interbody Fusion   Epidural steroid injection.   Transpedicular Bone marrow aspiration;  Surgeon: Jeniffer Eugene MD;  Location: [facility] OR     HC COLONOSCOPY THRU STOMA WITH BIOPSY  10/29/2003    Impression is that of normal appearing colonoscopy, without evidence of rectal bleeding.     HC COLONOSCOPY THRU STOMA, DIAGNOSTIC  10/00    normal     HC COLONOSCOPY THRU STOMA, DIAGNOSTIC  Oct 2009    Dr López- normal     HC EEG AWAKE AND SLEEP      abnormal     HC MRI BRAIN W/O CONTRAST  12/00    normal     HC REMOVAL GALLBLADDER  8/5/2009    [facility].     HC UGI ENDOSCOPY DIAG W BIOPSY  11/11/09    Normal esophagus     HC UGI ENDOSCOPY, SIMPLE EXAM  7/00, 10/00    mild chronic esophagitis and duodenitis,  neg H pylori     HC UGI ENDOSCOPY, SIMPLE EXAM  01/20/2005    Esophagogastroduodenoscopy, colonoscopy with biopsies.  [facility]     HC UGI ENDOSCOPY, SIMPLE EXAM  7/1/2009    [facility] [place]     HC UGI ENDOSCOPY, SIMPLE EXAM  11/11/2009    attempted upper GI, pt. could not tolerate procedure:[facility]     ORTHOPEDIC SURGERY  October 19,2011    diskectomy L4-L5       Family History:    Family History   Problem Relation Age of Onset     Gastrointestinal Disease Brother         severe Crohn's     Neurologic Disorder Brother         Seizures post head injury     Depression Brother      Substance Abuse Brother      Genitourinary Problems Father         kidney stones     Diabetes Father      Heart Disease Father         Open heart surgery     Breast Cancer Maternal Grandmother      Parkinsonism Maternal Grandmother      Cerebrovascular Disease Paternal Grandmother      Cancer Maternal Grandfather         Lung     Cardiovascular Paternal Grandfather         Heart Attack     Substance Abuse Mother      Lung Cancer Paternal Uncle      Cancer Paternal Uncle      Lung Cancer Maternal Uncle        Social History:  Marital Status:  Single [1]  Social History     Tobacco Use     Smoking status: Current Every Day Smoker     Packs/day: 0.50     Types: Cigarettes     Smokeless tobacco: Never Used   Substance Use Topics     Alcohol use: Yes     Comment: Not since last July     Drug use: Yes     Types: Marijuana     Comment: Medical Marijuana currently        Medications:      acetaminophen (TYLENOL) 500 MG tablet   albuterol (2.5 MG/3ML) 0.083% neb solution   albuterol (PROAIR HFA/PROVENTIL HFA/VENTOLIN HFA) 108 (90 Base) MCG/ACT Inhaler   ARIPiprazole (ABILIFY) 15 MG tablet   busPIRone (BUSPAR) 15 MG tablet   cetirizine (ZYRTEC) 10 MG tablet   clonazePAM (KLONOPIN) 0.5 MG tablet   cyclobenzaprine (FLEXERIL) 10 MG tablet   diazepam (VALIUM) 10 MG tablet   dicyclomine (BENTYL) 20 MG  tablet   DULoxetine (CYMBALTA) 60 MG EC capsule   EPINEPHrine (EPIPEN/ADRENACLICK/OR ANY BX GENERIC EQUIV) 0.3 MG/0.3ML injection 2-pack   gabapentin (NEURONTIN) 300 MG capsule   levocetirizine (XYZAL) 5 MG tablet   medical cannabis inhalation (Patient's own supply.  Not a prescription)   medroxyPROGESTERone (DEPO-PROVERA) 150 MG/ML IM injection   NONFORMULARY   nystatin (MYCOSTATIN) cream   ondansetron (ZOFRAN ODT) 4 MG ODT tab   pantoprazole (PROTONIX) 40 MG EC tablet   polyethylene glycol (MIRALAX) powder   prazosin (MINIPRESS) 1 MG capsule   rizatriptan (MAXALT) 5 MG tablet   rizatriptan (MAXALT-MLT) 5 MG ODT tab         Review of Systems   All other systems reviewed and are negative.      Physical Exam   BP: 137/86  Heart Rate: 97  Temp: 98.9  F (37.2  C)  Resp: 16  Weight: 81.2 kg (179 lb)  SpO2: 98 %      Physical Exam   Constitutional: She is oriented to person, place, and time. She appears well-developed and well-nourished. No distress.   HENT:   Head: Normocephalic and atraumatic.   Eyes: No scleral icterus.   Neck: Normal range of motion. Neck supple.   Cardiovascular: Normal rate.   Pulmonary/Chest: Effort normal.   Neurological: She is alert and oriented to person, place, and time.   Skin: Skin is warm and dry. No rash noted. She is not diaphoretic. No erythema. No pallor.   Nursing note and vitals reviewed.      ED Course        Procedures             Results for orders placed or performed during the hospital encounter of 02/11/19 (from the past 24 hour(s))   Routine UA with microscopic   Result Value Ref Range    Color Urine Yellow     Appearance Urine Clear     Glucose Urine Negative NEG^Negative mg/dL    Bilirubin Urine Negative NEG^Negative    Ketones Urine Negative NEG^Negative mg/dL    Specific Gravity Urine 1.013 1.003 - 1.035    Blood Urine Negative NEG^Negative    pH Urine 6.0 5.0 - 7.0 pH    Protein Albumin Urine Negative NEG^Negative mg/dL    Urobilinogen mg/dL 0.0 0.0 - 2.0 mg/dL    Nitrite  Urine Positive (A) NEG^Negative    Leukocyte Esterase Urine Trace (A) NEG^Negative    Source Midstream Urine     WBC Urine 15 (H) 0 - 5 /HPF    RBC Urine 1 0 - 2 /HPF    Bacteria Urine Few (A) NEG^Negative /HPF    Squamous Epithelial /HPF Urine <1 0 - 1 /HPF   Urine Culture   Result Value Ref Range    Specimen Description Unspecified Urine     Special Requests Specimen received in preservative     Culture Micro PENDING    CBC with platelets differential   Result Value Ref Range    WBC 7.7 4.0 - 11.0 10e9/L    RBC Count 4.23 3.8 - 5.2 10e12/L    Hemoglobin 13.8 11.7 - 15.7 g/dL    Hematocrit 40.2 35.0 - 47.0 %    MCV 95 78 - 100 fl    MCH 32.6 26.5 - 33.0 pg    MCHC 34.3 31.5 - 36.5 g/dL    RDW 12.3 10.0 - 15.0 %    Platelet Count 286 150 - 450 10e9/L    Diff Method Automated Method     % Neutrophils 69.3 %    % Lymphocytes 21.0 %    % Monocytes 8.2 %    % Eosinophils 0.9 %    % Basophils 0.3 %    % Immature Granulocytes 0.3 %    Nucleated RBCs 0 0 /100    Absolute Neutrophil 5.3 1.6 - 8.3 10e9/L    Absolute Lymphocytes 1.6 0.8 - 5.3 10e9/L    Absolute Monocytes 0.6 0.0 - 1.3 10e9/L    Absolute Basophils 0.0 0.0 - 0.2 10e9/L    Abs Immature Granulocytes 0.0 0 - 0.4 10e9/L    Absolute Nucleated RBC 0.0    Basic metabolic panel   Result Value Ref Range    Sodium 144 133 - 144 mmol/L    Potassium 3.6 3.4 - 5.3 mmol/L    Chloride 110 (H) 94 - 109 mmol/L    Carbon Dioxide 27 20 - 32 mmol/L    Anion Gap 7 3 - 14 mmol/L    Glucose 106 (H) 70 - 99 mg/dL    Urea Nitrogen 17 7 - 30 mg/dL    Creatinine 0.85 0.52 - 1.04 mg/dL    GFR Estimate >90 >60 mL/min/[1.73_m2]    GFR Estimate If Black >90 >60 mL/min/[1.73_m2]    Calcium 8.9 8.5 - 10.1 mg/dL     *Note: Due to a large number of results and/or encounters for the requested time period, some results have not been displayed. A complete set of results can be found in Results Review.       Medications - No data to display    Assessments & Plan (with Medical Decision  Making)  Nausea vomiting and diarrhea, recurrent  The patient was seen just yesterday and had fairly unremarkable labs and was treated with IV Dilaudid, promethazine and Benadryl.  Per report she was told not to drive home and that she needed a ride.  Evidently per security she got in her car and drove away at about 2 in the morning.  The patient denies this and states that she was out smoking.  Regardless I offered IM Phenergan and Benadryl but I required her to have her ride here in the emergency department before I would give it.  The patient became upset stating that she did not want to wait because it took her ride an hour to get here.  I do not think that laboratory evaluation or CT scan is indicated at this time given this is a recurrent issue with multiple normal CT scans in the past and she had an evaluation yesterday with a normal white blood cell count and normal electrolyte panel.  I did not offer narcotic pain medications after hearing about her driving home last night.  The patient stated she would not wait for the medications.  Her emesis bag was completely dry and she did not dry heaves or vomit while I was in the room.  She was awake and alert and interacting normally.  At this time the patient decided to leave abruptly after not getting what she wanted.  She is of sound mind and therefore I will let her.  Apparently when she left the department she was swearing and she walked outside and walked down to the lower part of the parking lot.  No one saw her get into a car today because it was too far away but at the same time she did not come back in the building so it seems likely that she did drive home.     I have reviewed the nursing notes.    I have reviewed the findings, diagnosis, plan and need for follow up with the patient.         Medication List      ASK your doctor about these medications    * fluconazole 150 MG tablet  Commonly known as:  DIFLUCAN  150 mg, Oral, ONCE, May repeat after 3  days if symptoms persist  Ask about: Should I take this medication?     * fluconazole 150 MG tablet  Commonly known as:  DIFLUCAN  150 mg, Oral, ONCE  Ask about: Should I take this medication?     * lidocaine (viscous) 15 mL, alum & mag hydroxide-simethicone 15 mL GI Cocktail  30 mLs, Oral, ONCE  Ask about: Should I take this medication?     * lidocaine (viscous) 15 mL, alum & mag hydroxide-simethicone 15 mL GI Cocktail  30 mLs, Oral, ONCE  Ask about: Should I take this medication?     * lidocaine (viscous) 15 mL, alum & mag hydroxide-simethicone 15 mL GI Cocktail  30 mLs, Oral, ONCE  Ask about: Should I take this medication?     * lidocaine (viscous) 15 mL, alum & mag hydroxide-simethicone 15 mL GI Cocktail  30 mLs, Oral, ONCE  Ask about: Should I take this medication?     nystatin 613724 UNIT/GM external cream  Commonly known as:  MYCOSTATIN  Topical, 3 TIMES DAILY  Ask about: Should I take this medication?     * ondansetron 4 MG tablet  Commonly known as:  ZOFRAN  4 mg, Oral, ONCE  Ask about: Should I take this medication?     * ondansetron 2 MG/ML Soln injection  Commonly known as:  ZOFRAN  8 mg, Intravenous, ONCE  Ask about: Should I take this medication?     * ondansetron 8 MG tablet  Commonly known as:  ZOFRAN  8 mg, Oral, ONCE  Ask about: Should I take this medication?     promethazine 6.25 MG/5ML syrup  Commonly known as:  PHENERGAN  12.5 mg, Oral, 4 TIMES DAILY PRN  Ask about: Should I take this medication?     * triamcinolone 40 MG/ML injection  Commonly known as:  KENALOG  40 mg, INTRA-ARTICULAR, ONCE  Ask about: Should I take this medication?     * triamcinolone 40 MG/ML injection  Commonly known as:  KENALOG  40 mg, INTRA-ARTICULAR, ONCE  Ask about: Should I take this medication?         * This list has 11 medication(s) that are the same as other medications prescribed for you. Read the directions carefully, and ask your doctor or other care provider to review them with you.                Final  diagnoses:   None     This document serves as a record of services personally performed by Eric Perez MD. It was created on their behalf by Galina Tenorio, a trained medical scribe. The creation of this record is based on the provider's personal observations and the statements of the patient. This document has been checked and approved by the attending provider.    Note: Chart documentation done in part with Dragon Voice Recognition software. Although reviewed after completion, some word and grammatical errors may remain.    2/12/2019   [facility] EMERGENCY DEPARTMENT     Stone Perez MD  02/12/19 [id number]       Stone Perez MD  02/12/19 [id number]

## 2019-02-12 NOTE — ED PROVIDER NOTES
History     Chief Complaint   Patient presents with     Abdominal Pain     Nausea, Vomiting, & Diarrhea     The history is provided by the patient.     Katy Islas is a 28 year old female who presents to the emergency department for abdominal pain, nausea, vomiting and diarrhea. Patient reports her vomiting started around 1900 with some blood. She has occasional diarrhea. States she has abdominal pain throughout her abdomen that goes into her back. She is already on Macrobid for a bladder infection.     Allergies:  Allergies   Allergen Reactions     Ativan [Lorazepam] Hives     At the IV site     Baclofen      hives     Bees Hives, Swelling and Difficulty breathing     Caffeine      Contrast Dye      Hives,   Updated 5/10/2016 CT Contrast.     Iodine Hives     Methocarbamol Swelling     Metoclopramide      Other reaction(s): Tremors  LW Reaction: shaking/sweating     Midazolam      Other reaction(s): Agitation     Monosodium Glutamate      Morphine Other (See Comments)     Difficulty with urination     Nsaids Other (See Comments)     GI BLEED x2     Other (Do Not Use) Other (See Comments)     Xanaflex- pt becomes disoriented and loses bladder control     Reglan [Metoclopramide Hcl] Other (See Comments)     shaking     Soma [Carisoprodol] Visual Disturbance     Sleep walking     Tizanidine      Topamax Other (See Comments)     Topamax [Topiramate] Nausea     Tingling  GI/Vomit     Tramadol      Severe Headache, Seizure Risk     Tylenol W/Codeine [Acetaminophen-Codeine] Nausea and Itching     Tylenol 3     Valium Other (See Comments)     Becomes aggressive and angry     Versed Other (See Comments)     Zolmitriptan      Makes face feel like its twitching     Droperidol Anxiety     Flu Virus Vaccine Rash      Arm swelling       Problem List:    Patient Active Problem List    Diagnosis Date Noted     Hypophosphatemia 11/01/2018     Priority: Medium     Patellar maltracking, right 09/05/2018     Priority: Medium      Right anterior knee pain 09/05/2018     Priority: Medium     Melanocytic nevus, unspecified location 07/23/2018     Priority: Medium     Dysplastic skin lesion 07/23/2018     Priority: Medium     Iliotibial band syndrome affecting lower leg, right 12/21/2017     Priority: Medium     Chondromalacia of right patellofemoral joint 12/21/2017     Priority: Medium     Postoperative pain 11/13/2017     Priority: Medium     Closed fracture of coccyx with nonunion, subsequent encounter 10/12/2017     Priority: Medium     Upper GI bleed - suspected 07/01/2017     Priority: Medium     Tobacco use disorder 07/01/2017     Priority: Medium     History of pseudoseizure 07/01/2017     Priority: Medium     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) 03/10/2017     Priority: Medium     Lumbar disc disease with radiculopathy 02/23/2017     Priority: Medium     S/P lumbar fusion 02/23/2017     Priority: Medium     Dr. YOVANI Millan, 2/23/17       Vitamin D deficiency 01/06/2017     Priority: Medium     Atypical mole 01/06/2017     Priority: Medium     Mild persistent asthma without complication 12/02/2016     Priority: Medium     Chronic bilateral low back pain with left-sided sciatica 12/02/2016     Priority: Medium     Bee sting allergy 09/16/2016     Priority: Medium     Gastroesophageal reflux disease without esophagitis 08/26/2016     Priority: Medium     Herpes simplex virus infection 11/12/2015     Priority: Medium     Adjustment disorder with mixed anxiety and depressed mood 10/14/2015     Priority: Medium     Marijuana abuse 02/22/2015     Priority: Medium     Grand mal seizure disorder (H) 10/08/2013     Priority: Medium     Bipolar disorder (H) 01/31/2013     Priority: Medium     Problem list name updated by automated process. Provider to review       Nausea and vomiting 12/13/2012     Priority: Medium     Oral thrush 06/25/2012     Priority: Medium     Chronic pain 02/09/2012     Priority: Medium     Patient is  followed by Aura Rosario PA-C for ongoing prescription of pain medication.  All refills should only be approved by this provider, or covering partner.    Medication(s): Norco    Maximum quantity per month: 70  Clinic visit frequency required: Q 2 months     Controlled substance agreement:   Discussed and signed 4/25/17    Encounter-Level CSA - 01/12/2015:                 Controlled Substance Agreement - Scan on 1/26/2015  9:14 AM : Controlled Medication Agreement-01/12/15 (below)            Pain Clinic evaluation in the past: Yes       Date/Location:   [facility], fall 2016    DIRE Total Score(s):    4/25/2017   Total Score 17       Last [facility] website verification:  done on 4/25/17 by LOVE Maki   [email or web address]           Anxiety 09/22/2011     Priority: Medium     Mild major depression (H) 08/29/2011     Priority: Medium     Crohn's colitis (H) 08/04/2009     Priority: Medium     Dysmenorrhea 05/11/2007     Priority: Medium     Benign neoplasm of skin of lower limb, including hip 03/16/2004     Priority: Medium     Migraine      Priority: Medium     Dr. Farrar - Neurology.  Now on Inderal.  Seems to be working.  Follow-up 9/08  Problem list name updated by automated process. Provider to review       Hearing loss      Priority: Medium     Congenital  Problem list name updated by automated process. Provider to review       Allergic rhinitis      Priority: Medium     Problem list name updated by automated process. Provider to review          Past Medical History:    Past Medical History:   Diagnosis Date     Abdominal pain 10/31- 11/4/2005     Allergic rhinitis, cause unspecified      Arthritis      C. 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     FUSION SPINE POSTERIOR MINIMALLY INVASIVE ONE LEVEL N/A 2/23/2017    Procedure: FUSION SPINE POSTERIOR MINIMALLY INVASIVE ONE LEVEL;  L4-5 Oblique Lateral Lumbar Interbody Fusion   Epidural steroid injection.   Transpedicular Bone marrow aspiration;  Surgeon: Jeniffer Eugene MD;  Location: [facility] OR     HC COLONOSCOPY THRU STOMA WITH BIOPSY  10/29/2003    Impression is that of normal appearing colonoscopy, without evidence of rectal bleeding.     HC COLONOSCOPY THRU STOMA, DIAGNOSTIC  10/00    normal     HC COLONOSCOPY THRU STOMA, DIAGNOSTIC  Oct 2009    Dr López- normal     HC EEG AWAKE AND SLEEP      abnormal     HC MRI BRAIN W/O CONTRAST  12/00    normal     HC REMOVAL GALLBLADDER  8/5/2009    [facility], [facility].     HC UGI ENDOSCOPY DIAG W BIOPSY  11/11/09    Normal esophagus     HC UGI ENDOSCOPY, SIMPLE EXAM  7/00, 10/00    mild chronic esophagitis and duodenitis, neg H pylori     HC UGI ENDOSCOPY, SIMPLE EXAM  01/20/2005    Esophagogastroduodenoscopy, colonoscopy with biopsies.  [facility] UGI ENDOSCOPY, SIMPLE EXAM  7/1/2009    [facility], [facility].      UGI ENDOSCOPY, SIMPLE EXAM  11/11/2009    attempted upper GI, pt. could not tolerate procedure:[facility]     ORTHOPEDIC SURGERY  October 19,2011    diskectomy L4-L5       Family History:    Family History   Problem Relation Age of Onset     Gastrointestinal Disease Brother         severe  Crohn's     Neurologic Disorder Brother         Seizures post head injury     Depression Brother      Substance Abuse Brother      Genitourinary Problems Father         kidney stones     Diabetes Father      Heart Disease Father         Open heart surgery     Breast Cancer Maternal Grandmother      Parkinsonism Maternal Grandmother      Cerebrovascular Disease Paternal Grandmother      Cancer Maternal Grandfather         Lung     Cardiovascular Paternal Grandfather         Heart Attack     Substance Abuse Mother      Lung Cancer Paternal Uncle      Cancer Paternal Uncle      Lung Cancer Maternal Uncle        Social History:  Marital Status:  Single [1]  Social History     Tobacco Use     Smoking status: Current Every Day Smoker     Packs/day: 0.50     Types: Cigarettes     Smokeless tobacco: Never Used   Substance Use Topics     Alcohol use: Yes     Comment: Not since last July     Drug use: Yes     Types: Marijuana     Comment: Medical Marijuana currently        Medications:      acetaminophen (TYLENOL) 500 MG tablet   ondansetron (ZOFRAN ODT) 4 MG ODT tab   albuterol (2.5 MG/3ML) 0.083% neb solution   albuterol (PROAIR HFA/PROVENTIL HFA/VENTOLIN HFA) 108 (90 Base) MCG/ACT Inhaler   ARIPiprazole (ABILIFY) 15 MG tablet   busPIRone (BUSPAR) 15 MG tablet   cetirizine (ZYRTEC) 10 MG tablet   clonazePAM (KLONOPIN) 0.5 MG tablet   cyclobenzaprine (FLEXERIL) 10 MG tablet   diazepam (VALIUM) 10 MG tablet   dicyclomine (BENTYL) 20 MG tablet   DULoxetine (CYMBALTA) 60 MG EC capsule   EPINEPHrine (EPIPEN/ADRENACLICK/OR ANY BX GENERIC EQUIV) 0.3 MG/0.3ML injection 2-pack   gabapentin (NEURONTIN) 300 MG capsule   levocetirizine (XYZAL) 5 MG tablet   medical cannabis inhalation (Patient's own supply.  Not a prescription)   medroxyPROGESTERone (DEPO-PROVERA) 150 MG/ML IM injection   NONFORMULARY   nystatin (MYCOSTATIN) cream   pantoprazole (PROTONIX) 40 MG EC tablet   polyethylene glycol (MIRALAX) powder   prazosin (MINIPRESS) 1  MG capsule   rizatriptan (MAXALT) 5 MG tablet   rizatriptan (MAXALT-MLT) 5 MG ODT tab         Review of Systems   Gastrointestinal: Positive for abdominal pain, diarrhea, nausea and vomiting.   All other systems reviewed and are negative.      Physical Exam   BP: 128/87  Pulse: 128  Temp: 99.8  F (37.7  C)  Resp: 20  Weight: 77.6 kg (171 lb)  SpO2: 93 %      Physical Exam   Constitutional: She is oriented to person, place, and time. She appears well-developed and well-nourished. No distress.   HENT:   Head: Normocephalic and atraumatic.   Eyes: Pupils are equal, round, and reactive to light. No scleral icterus.   Neck: Normal range of motion. Neck supple.   Cardiovascular: Regular rhythm. Tachycardia present.   Pulmonary/Chest: Effort normal and breath sounds normal.   Abdominal: Soft. There is tenderness (mild generalized ).   Musculoskeletal: Normal range of motion.   Neurological: She is alert and oriented to person, place, and time.   Skin: Skin is warm and dry. No rash noted. She is not diaphoretic. No erythema. No pallor.   Nursing note and vitals reviewed.      ED Course        Procedures    Results for orders placed or performed during the hospital encounter of 02/11/19 (from the past 24 hour(s))   Routine UA with microscopic   Result Value Ref Range    Color Urine Yellow     Appearance Urine Clear     Glucose Urine Negative NEG^Negative mg/dL    Bilirubin Urine Negative NEG^Negative    Ketones Urine Negative NEG^Negative mg/dL    Specific Gravity Urine 1.013 1.003 - 1.035    Blood Urine Negative NEG^Negative    pH Urine 6.0 5.0 - 7.0 pH    Protein Albumin Urine Negative NEG^Negative mg/dL    Urobilinogen mg/dL 0.0 0.0 - 2.0 mg/dL    Nitrite Urine Positive (A) NEG^Negative    Leukocyte Esterase Urine Trace (A) NEG^Negative    Source Midstream Urine     WBC Urine 15 (H) 0 - 5 /HPF    RBC Urine 1 0 - 2 /HPF    Bacteria Urine Few (A) NEG^Negative /HPF    Squamous Epithelial /HPF Urine <1 0 - 1 /HPF   CBC with  platelets differential   Result Value Ref Range    WBC 7.7 4.0 - 11.0 10e9/L    RBC Count 4.23 3.8 - 5.2 10e12/L    Hemoglobin 13.8 11.7 - 15.7 g/dL    Hematocrit 40.2 35.0 - 47.0 %    MCV 95 78 - 100 fl    MCH 32.6 26.5 - 33.0 pg    MCHC 34.3 31.5 - 36.5 g/dL    RDW 12.3 10.0 - 15.0 %    Platelet Count 286 150 - 450 10e9/L    Diff Method Automated Method     % Neutrophils 69.3 %    % Lymphocytes 21.0 %    % Monocytes 8.2 %    % Eosinophils 0.9 %    % Basophils 0.3 %    % Immature Granulocytes 0.3 %    Nucleated RBCs 0 0 /100    Absolute Neutrophil 5.3 1.6 - 8.3 10e9/L    Absolute Lymphocytes 1.6 0.8 - 5.3 10e9/L    Absolute Monocytes 0.6 0.0 - 1.3 10e9/L    Absolute Basophils 0.0 0.0 - 0.2 10e9/L    Abs Immature Granulocytes 0.0 0 - 0.4 10e9/L    Absolute Nucleated RBC 0.0    Basic metabolic panel   Result Value Ref Range    Sodium 144 133 - 144 mmol/L    Potassium 3.6 3.4 - 5.3 mmol/L    Chloride 110 (H) 94 - 109 mmol/L    Carbon Dioxide 27 20 - 32 mmol/L    Anion Gap 7 3 - 14 mmol/L    Glucose 106 (H) 70 - 99 mg/dL    Urea Nitrogen 17 7 - 30 mg/dL    Creatinine 0.85 0.52 - 1.04 mg/dL    GFR Estimate >90 >60 mL/min/[1.73_m2]    GFR Estimate If Black >90 >60 mL/min/[1.73_m2]    Calcium 8.9 8.5 - 10.1 mg/dL     *Note: Due to a large number of results and/or encounters for the requested time period, some results have not been displayed. A complete set of results can be found in Results Review.       Medications   0.9% sodium chloride BOLUS (1,000 mLs Intravenous New Bag 2/11/19 7759)   ondansetron (ZOFRAN) injection 4 mg (not administered)   HYDROmorphone (PF) (DILAUDID) injection 0.5 mg (0.5 mg Intravenous Given 2/11/19 2149)   promethazine (PHENERGAN) IV injection 25 mg (25 mg Intravenous Given 2/11/19 2155)   diphenhydrAMINE (BENADRYL) injection 25 mg (25 mg Intravenous Given 2/11/19 2147)       Assessments & Plan (with Medical Decision Making)  Katy is a 28-year-old female, presents to the emergency room with  abdominal pain, vomiting and diarrhea.  Please refer to HPI and focused exam patient reports she is concerned because she is being treated for a bladder infection.  Patient is well-known to me and will frequently visit our emergency room complaining of abdominal pain with a negative workup demanding several doses of Dilaudid, Phenergan and Benadryl.  She is tachycardic on arrival here so certainly significant systemic infection does need to be ruled out although again this is not atypical to be tachycardic when she gets here.  Peripheral IV was established, patient was given a liter of normal saline, patient was given Dilaudid and Benadryl and Phenergan x1, this was pending blood work and urine sample.  Urine shows 15 white cells with leukocyte esterase and nitrites, culture is pending, last UA was [facility] with pansensitive culture, this is been improving.  Blood work today is completely unremarkable including a normal white count, creatinine of 0.85 and a GFR of greater than 90.  Patient was informed she will not get any further pain medication, there is no indication for any imaging or further workup, heart rate improved here with IV fluids and came down to 110.  Patient remained normotensive and was discharged in stable condition with a .     I have reviewed the nursing notes.    I have reviewed the findings, diagnosis, plan and need for follow up with the patient.  Final diagnoses:   Urinary tract infection in female   Abdominal pain, generalized     This document serves as a record of services personally performed by Lizzie Del Toro CNP. It was created on their behalf by Galina Tenorio, a trained medical scribe. The creation of this record is based on the provider's personal observations and the statements of the patient. This document has been checked and approved by the attending provider.    Note: Chart documentation done in part with Dragon Voice Recognition software. Although  reviewed after completion, some word and grammatical errors may remain.    2/11/2019   [facility] EMERGENCY DEPARTMENT     Flori Del Toro, NATA CNP  02/11/19 1008

## 2019-02-12 NOTE — ED TRIAGE NOTES
Pt presents with abdominal pain, nausea, vomiting and diarrhea for a few hours.  She reports that she had a kidney infection 2 weeks ago, not sure if it has cleared. She took Zofran and Tylenol at 1900.

## 2019-02-12 NOTE — LETTER
[place] CARE COORDINATION  [facility]   [address] [place] [address]   [phone]    February 12, 2019    Katy Islas  [address]  [place] [address]      Dear Katy,    I am a clinic care coordinator who works with Aura Rosario PA-C at [facility]. I have been trying to reach you recently to introduce Clinic Care Coordination and to see if there was anything I could assist you with.  I wanted to introduce myself and provide you with my contact information so that you can call me with questions or concerns about your health care. Below is a description of clinic care coordination and how I can further assist you.     The clinic care coordinator is a registered nurse and/or  who understand the health care system. The goal of clinic care coordination is to help you manage your health and improve access to the [facility] system in the most efficient manner. The registered nurse can assist you in meeting your health care goals by providing education, coordinating services, and strengthening the communication among your providers. The  can assist you with financial, behavioral, psychosocial, chemical dependency, counseling, and/or psychiatric resources.    Please feel free to contact me at [phone], with any questions or concerns. We at [facility] are focused on providing you with the highest-quality healthcare experience possible and that all starts with you.     Sincerely,     Tanika Govea        Enclosed: I have enclosed a copy of a 24 Hour Access Plan. This has helpful phone numbers for you to call when needed. Please keep this in an easy to access place to use as needed.  I have sent a Consent to Communicate form for you to complete (as you wish) to allow us to discuss/share your personal health information with whomever you choose on your behalf.   I have highlighted the areas for you to review/address.   "Please complete all that apply.  Please check the box for medical information if you allow the clinic to share personal health information with whomever you choose.  This form must be signed and dated to be valid.  If you check all boxes, please be aware that checking \"nothing\" despite checking other boxes will allow no information to be shared.      If you wish not to have information regarding your mental health, chemical health, AIDS/HIV or sickle cell anemia, then please initial the second bullet point at the end of the form.  If you allow this information to be shared with those noted above on the form, then leave this area blank and do not initial.      This form does not , you can make a change to this document at any time.    "

## 2019-02-12 NOTE — DISCHARGE INSTRUCTIONS
Katy your urine shows a minimal infection.  Your white count and remaining blood work is unremarkable.  You can continue antibiotics as previously prescribed, stay well-hydrated and follow-up with your primary care provider.

## 2019-02-12 NOTE — ED NOTES
Pt states she is on an abx for uti and c/o n/v.  She has tried zofran but it does not work.  She is requesting pain meds, but yesterday did receive some and drove her car after saying someone was coming to take her home.

## 2019-02-12 NOTE — TELEPHONE ENCOUNTER
Spoke with patient.  Through interceptor services.  Was seen for kidney infection. Sleeping 12 hours a day and vomiting bile.    Has zofran at home and not helping.     States that she was seen at [facility] and given Macrobid and is still having symptoms.  Review of chart shows that she was seen in ED [place] and she was not sent home with any medication to help.  She states that she's still having pain and burning when she's urinating.      Advised to still having pain with urination she needs to be seen.  She would like to be seen today.  No openings in [facility], ER, [facility].  There is an opening at  but patient needs .  States that if they can't find one to let her know and she will go to ER.    Huddled with reception and they will work on it.      RECOMMENDED DISPOSITION:  See in 24 hours -   Will comply with recommendation: yes   If further questions/concerns or if Sx do not improve, worsen or new Sx develop, call your PCP or [facility] as soon as possible.    NOTES:  Disposition was determined by the first positive assessment question, therefore all previous assessment questions were negative.     Guideline used:  Telephone Triage Protocols for Nurses, Fifth Edition, Telma Bernstein, RN, BSN

## 2019-02-12 NOTE — ED NOTES
PT continues to sleep, unable to rouse with shaking, taking IV out, obtaining B/P, and turning all lights in room on.

## 2019-02-12 NOTE — PROGRESS NOTES
Clinic Care Coordination Contact  [facility]/Voicemail    Referral Source: ED Follow-Up    Clinical Data: Care Coordinator Outreach, ED f/u 2/11/19 UTI.     Outreach attempted x 1.  Left message on voicemail with call back information and requested return call.    Plan: Care Coordinator will mail out care coordination introduction letter with care coordinator contact information and explanation of care coordination services. Care Coordinator will try to reach patient again in 1-2 business days.    Tanika Govea RN, [facility]  RN Care Coordinator  [facility], [place], [facility]  Phone # [phone]  2/12/2019 10:59 AM

## 2019-02-12 NOTE — RESULT ENCOUNTER NOTE
Emergency Dept/Urgent Care discharge antibiotic (if prescribed): None; (On Macrobid prior to ED visit)  No changes in treatment per Urine culture protocol.

## 2019-02-12 NOTE — ED NOTES
Pt continues to sleep, unable to arouse pt from sleep.  Vital signs remain stable.  Pt is snoring, attempted to wake pt.

## 2019-02-13 ASSESSMENT — ACTIVITIES OF DAILY LIVING (ADL): DEPENDENT_IADLS:: INDEPENDENT

## 2019-02-13 NOTE — PROGRESS NOTES
Clinic Care Coordination Contact  [facility]/Voicemail    Referral Source: ED Follow-Up    Clinical Data: Care Coordinator Outreach, ED f/u 2/11/19 UTI and 2/12/19 ER for   nausea/vomiting.    Outreach attempted x 2.  Left message on voicemail with call back information and requested return call.    Plan: Care Coordinator mailed out care coordination introduction letter on 2/12/19. Care Coordinator will do no further outreaches at this time.    Tanika Govea RN, [facility]  RN Care Coordinator  [facility], [facility]  Phone # [phone]  2/13/2019 9:48 AM

## 2019-02-14 ENCOUNTER — TELEPHONE (OUTPATIENT)
Dept: EMERGENCY MEDICINE | Facility: CLINIC | Age: 29
End: 2019-02-14

## 2019-02-14 DIAGNOSIS — N39.0 URINARY TRACT INFECTION: ICD-10-CM

## 2019-02-14 LAB
BACTERIA SPEC CULT: ABNORMAL
Lab: ABNORMAL
SPECIMEN SOURCE: ABNORMAL

## 2019-02-14 RX ORDER — CIPROFLOXACIN 500 MG/1
500 TABLET, FILM COATED ORAL 2 TIMES DAILY
Qty: 14 TABLET | Refills: 0 | Status: SHIPPED | OUTPATIENT
Start: 2019-02-14 | End: 2019-02-22

## 2019-02-14 NOTE — TELEPHONE ENCOUNTER
[facility] Emergency Department Lab result notification [Adult-Female]    [place] ED lab result protocol used  Urine culture    Reason for call  Notify of lab results, assess symptoms,  review ED providers recommendations/discharge instructions (if necessary) and advise per ED lab result f/u protocol    Lab Result (including Rx patient on, if applicable)  Final Urine Culture Report on 2/14/19  Emergency Dept/Urgent Care discharge antibiotic prescribed: None; (On Macrobid prior to ED visit)  #1. Bacteria, >100,000 colonies/mL Escherichia coli, is SUSCEPTIBLE to Antibiotic.    As per [place] ED Lab Result protocol, no change in antibiotic therapy.    Information table from ED Provider visit on 2/11/19-2/12/19  Symptoms reported at ED visit (Chief complaint, HPI) Katy Islas is a 28 year old female who presents to the emergency department for abdominal pain, nausea, vomiting and diarrhea. Patient reports her vomiting started around 1900 with some blood. She has occasional diarrhea. States she has abdominal pain throughout her abdomen that goes into her back. She is already on Macrobid for a bladder infection.    Significant Medical hx, if applicable (i.e. CKD, diabetes) C diff   Allergies Allergies   Allergen Reactions     Ativan [Lorazepam] Hives     At the IV site     Baclofen      hives     Bees Hives, Swelling and Difficulty breathing     Caffeine      Contrast Dye      Hives,   Updated 5/10/2016 CT Contrast.     Iodine Hives     Methocarbamol Swelling     Metoclopramide      Other reaction(s): Tremors  LW Reaction: shaking/sweating     Midazolam      Other reaction(s): Agitation     Monosodium Glutamate      Morphine Other (See Comments)     Difficulty with urination     Nsaids Other (See Comments)     GI BLEED x2     Other (Do Not Use) Other (See Comments)     Xanaflex- pt becomes disoriented and loses bladder control     Reglan [Metoclopramide Hcl] Other (See Comments)     shaking     Soma  [Carisoprodol] Visual Disturbance     Sleep walking     Tizanidine      Topamax Other (See Comments)     Topamax [Topiramate] Nausea     Tingling  GI/Vomit     Tramadol      Severe Headache, Seizure Risk     Tylenol W/Codeine [Acetaminophen-Codeine] Nausea and Itching     Tylenol 3     Valium Other (See Comments)     Becomes aggressive and angry     Versed Other (See Comments)     Zolmitriptan      Makes face feel like its twitching     Droperidol Anxiety     Flu Virus Vaccine Rash      Arm swelling      Weight, if applicable Wt Readings from Last 2 Encounters:   02/12/19 81.2 kg (179 lb)   02/11/19 77.6 kg (171 lb)      Coumadin/Warfarin [Yes /No] No   Creatinine Level (mg/dl) Creatinine   Date Value Ref Range Status   02/11/2019 0.85 0.52 - 1.04 mg/dL Final      Creatinine clearance (ml/min), if applicable Serum creatinine: 0.85 mg/dL 02/11/19 2140  Estimated creatinine clearance: 96.4 mL/min   Pregnant (Yes/No/NA) No   Breastfeeding (Yes/No/NA) No   ED providers Impression and Plan (applicable information) Katy is a 28-year-old female, presents to the emergency room with abdominal pain, vomiting and diarrhea.  Please refer to HPI and focused exam patient reports she is concerned because she is being treated for a bladder infection.  Patient is well-known to me and will frequently visit our emergency room complaining of abdominal pain with a negative workup demanding several doses of Dilaudid, Phenergan and Benadryl.  She is tachycardic on arrival here so certainly significant systemic infection does need to be ruled out although again this is not atypical to be tachycardic when she gets here.  Peripheral IV was established, patient was given a liter of normal saline, patient was given Dilaudid and Benadryl and Phenergan x1, this was pending blood work and urine sample.  Urine shows 15 white cells with leukocyte esterase and nitrites, culture is pending, last UA was reviewed on care everywhere at [facility] with  "pansensitive culture, this is been improving.  Blood work today is completely unremarkable including a normal white count, creatinine of 0.85 and a GFR of greater than 90.  Patient was informed she will not get any further pain medication, there is no indication for any imaging or further workup, heart rate improved here with IV fluids and came down to 110.  Patient remained normotensive and was discharged in stable condition with a .   ED diagnosis  Urinary tract infection in female   ED provider Flori Del Toro APRN CNP      RN Assessment (Patient s current Symptoms), include time called.  [Insert Left message here if message left]  Katy today is \"doing good\".  \"Still has some burning and resistance.\"  Katy did complete antibiotics on 2/11/19 per her report.  Temp today 97.3 after Tylneol, feels \"like I have the chills\".  \"I'm wondering if I have kidney stones\".   Katy \"did have horrible back pain and vomiting and they told me it was a UTI\".  Does report peeing hurts my back.   Pain not worse than when seen in ED.  Does report urethral pain is \"improved\".                 RN Recommendations/Instructions per [place] ED lab result protocol  Patient notified of lab result and treatment recommendations.  Rx for Cipro sent to [Pharmacy - [facility]'s].         Please Contact your PCP clinic or return to the Emergency department if your:    Symptoms return.    Symptoms do not improve after 3 days on antibiotic.    Symptoms do not resolve after completing antibiotic.    Symptoms worsen or other concerning symptom's.    PCP follow-up Questions asked: YES       Sujata Mathis RN    [facility] RN  Lung Nodule and ED Lab Results F/U RN  Epic pool (ED late result f/u RN) : [id number]   # [phone]    Copy of Lab result   Order   Urine Culture [[id number]] (Order [id number])   Exam Information     Exam Date Exam Time Accession # Results    2/11/19  9:08 PM [id number]    Component Results "     Specimen Information: Unspecified Urine        Component Collected Lab   Specimen Description 02/11/2019  9:08 PM 75   Unspecified Urine    Special Requests 02/11/2019  9:08 PM 75   Specimen received in preservative    Culture Micro (Abnormal) 02/11/2019  9:08 PM 75   Abnormal   >100,000 colonies/mL   Escherichia coli     Susceptibility     Escherichia coli (1)     Antibiotic Interpretation Sensitivity Method Status   AMPICILLIN Sensitive <=2 ug/mL JOHN Final   CEFAZOLIN Sensitive <=4 ug/mL JOHN Final    Cefazolin JOHN breakpoints are for the treatment of uncomplicated urinary tract   infections.  For the treatment of systemic infections, please contact the   laboratory for additional testing.   CEFOXITIN Sensitive <=4 ug/mL JOHN Final   CEFTAZIDIME Sensitive <=1 ug/mL JOHN Final   CEFTRIAXONE Sensitive <=1 ug/mL JOHN Final   CIPROFLOXACIN Sensitive <=0.25 ug/mL JOHN Final   GENTAMICIN Sensitive <=1 ug/mL JOHN Final   LEVOFLOXACIN Sensitive <=0.12 ug/mL JOHN Final   NITROFURANTOIN Sensitive <=16 ug/mL JOHN Final   TOBRAMYCIN Sensitive <=1 ug/mL JOHN Final   Trimethoprim/Sulfa Sensitive <=1/19 ug/mL JOHN Final   AMPICILLIN/SULBACTAM Sensitive <=2 ug/mL JOHN Final   Piperacillin/Tazo Sensitive <=4 ug/mL JOHN Final   CEFEPIME Sensitive <=1 ug/mL JOHN Final

## 2019-02-14 NOTE — RESULT ENCOUNTER NOTE
Final Urine Culture Report on 2/14/19  Emergency Dept/Urgent Care discharge antibiotic prescribed: None; (On Macrobid prior to ED visit)  #1. Bacteria, >100,000 colonies/mL Escherichia coli, is SUSCEPTIBLE to Antibiotic.    As per [place] ED Lab Result protocol, no change in antibiotic therapy.

## 2019-02-15 ENCOUNTER — TRANSFERRED RECORDS (OUTPATIENT)
Dept: HEALTH INFORMATION MANAGEMENT | Facility: CLINIC | Age: 29
End: 2019-02-15

## 2019-02-15 ENCOUNTER — TELEPHONE (OUTPATIENT)
Dept: FAMILY MEDICINE | Facility: OTHER | Age: 29
End: 2019-02-15

## 2019-02-15 NOTE — TELEPHONE ENCOUNTER
Reason for call:  Patient reporting a symptom    Symptom or request: Dysuria, nausea, headache, soreness and tenderness in lower back, swollen tonsils.     Duration (how long have symptoms been present): ongoing    Have you been treated for this before? Yes    Additional comments: Patient was seen in the ED yesterday and symptoms have not improved. She is taking antibiotics. Patient is wondering if she can have a CT ordered for possible kidney stones. Please give patient a call back.    Phone Number patient can be reached at:  Home number on file [phone] (home)    Best Time:  any    Can we leave a detailed message on this number:  YES    Call taken on 2/15/2019 at 11:10 AM by Genie Luo

## 2019-02-15 NOTE — TELEPHONE ENCOUNTER
Katy Islas is a 28 year old female who calls with stating she is feeling worse..    NURSING ASSESSMENT:  Description:  I spoke with the pt who states ED called them and they started Cipro. HA is worse. Nausea. HA for a month. Tonsils feel enlarged, maybe 3 times the size they should be. Very sore. No trouble swallowing.  No thermometer. Back pain, hurts when she urinates. No blood in urine. Started Antibiotic yesterday, total of 3 doses.   Onset/duration:  ongoing  Precip. factors:  UTI  Associated symptoms:  No vision changes, mostly in the face, both sides of head, makes my eyes ache. HAs for almost a month.  Improves/worsens symptoms:  Tylenol helps HA some.   Pain scale (0-10)   7/10    Allergies:   Allergies   Allergen Reactions     Ativan [Lorazepam] Hives     At the IV site     Baclofen      hives     Bees Hives, Swelling and Difficulty breathing     Caffeine      Contrast Dye      Hives,   Updated 5/10/2016 CT Contrast.     Iodine Hives     Methocarbamol Swelling     Metoclopramide      Other reaction(s): Tremors  LW Reaction: shaking/sweating     Midazolam      Other reaction(s): Agitation     Monosodium Glutamate      Morphine Other (See Comments)     Difficulty with urination     Nsaids Other (See Comments)     GI BLEED x2     Other (Do Not Use) Other (See Comments)     Xanaflex- pt becomes disoriented and loses bladder control     Reglan [Metoclopramide Hcl] Other (See Comments)     shaking     Soma [Carisoprodol] Visual Disturbance     Sleep walking     Tizanidine      Topamax Other (See Comments)     Topamax [Topiramate] Nausea     Tingling  GI/Vomit     Tramadol      Severe Headache, Seizure Risk     Tylenol W/Codeine [Acetaminophen-Codeine] Nausea and Itching     Tylenol 3     Valium Other (See Comments)     Becomes aggressive and angry     Versed Other (See Comments)     Zolmitriptan      Makes face feel like its twitching     Droperidol Anxiety     Flu Virus Vaccine Rash      Arm swelling      RECOMMENDED DISPOSITION:  Home care advice - if not improving after 3 days on antibiotic to callback. Home care advice for HA. Pt will callback if not better or develops new symptoms. Informed her nurse line available 24 hours a day.   Will comply with recommendation: Yes  If further questions/concerns or if symptoms do not improve, worsen or new symptoms develop, call your PCP or [facility] as soon as possible.      Guideline used: Headache, ED notes and F/U instructions  Telephone Triage Protocols for Nurses, Fifth Edition, Telma Jimenez RN

## 2019-02-19 ENCOUNTER — TELEPHONE (OUTPATIENT)
Dept: FAMILY MEDICINE | Facility: OTHER | Age: 29
End: 2019-02-19

## 2019-02-19 NOTE — PROGRESS NOTES
SUBJECTIVE:   Katy Islas is a 28 year old female who presents to clinic today for the following health issues:      HPI     ED/UC Followup:    Facility:  [facility]  Date of visit: 2/15/19  Reason for visit: Flank Pain  Current Status: Finished Cipro yesterday/ Better today       Acute Illness   Acute illness concerns: Cough   Onset: x 1 month    Fever: YES- Last Week    Chills/Sweats: YES- Sweats    Headache (location?): YES- Better Now    Sinus Pressure:no    Conjunctivitis:  no    Ear Pain: no    Rhinorrhea: YES    Congestion: YES    Sore Throat: YES- Better Today     Cough: YES-productive of green sputum, barking    Wheeze: no    Decreased Appetite: no    Nausea: YES    Vomiting: no    Diarrhea:  no    Dysuria/Freq.: no    Fatigue/Achiness: YES    Sick/Strep Exposure: no     Therapies Tried and outcome: Tylenol    Problem list and histories reviewed & adjusted, as indicated.  Additional history: as documented         Patient Active Problem List   Diagnosis     Hearing loss     Allergic rhinitis     Migraine     Benign neoplasm of skin of lower limb, including hip     Dysmenorrhea     Crohn's colitis (H)     Mild major depression (H)     Anxiety     Chronic pain     Oral thrush     Nausea and vomiting     Grand mal seizure disorder (H)     Bipolar disorder (H)     Marijuana abuse     Adjustment disorder with mixed anxiety and depressed mood     Herpes simplex virus infection     Gastroesophageal reflux disease without esophagitis     Bee sting allergy     Mild persistent asthma without complication     Chronic bilateral low back pain with left-sided sciatica     Vitamin D deficiency     Atypical mole     Lumbar disc disease with radiculopathy     S/P lumbar fusion     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     FUSION SPINE POSTERIOR MINIMALLY INVASIVE ONE LEVEL N/A 2/23/2017    Procedure: FUSION SPINE POSTERIOR MINIMALLY INVASIVE ONE LEVEL;  L4-5 Oblique Lateral Lumbar Interbody Fusion   Epidural steroid injection.   Transpedicular Bone marrow aspiration;  Surgeon: Jeniffer Eugene MD;  Location: [facility] OR     HC COLONOSCOPY THRU STOMA WITH BIOPSY  10/29/2003    Impression is that of normal appearing colonoscopy, without evidence of rectal bleeding.     HC COLONOSCOPY THRU STOMA, DIAGNOSTIC  10/00    normal     HC COLONOSCOPY THRU STOMA, DIAGNOSTIC  Oct 2009    Dr López- normal     HC EEG AWAKE AND SLEEP      abnormal     HC MRI BRAIN W/O CONTRAST  12/00    normal     HC REMOVAL GALLBLADDER  8/5/2009    [facility], [facility].     HC UGI ENDOSCOPY DIAG W BIOPSY  11/11/09    Normal esophagus     HC UGI ENDOSCOPY, SIMPLE EXAM  7/00, 10/00    mild chronic esophagitis and duodenitis, neg H pylori     HC UGI ENDOSCOPY, SIMPLE EXAM  01/20/2005    Esophagogastroduodenoscopy, colonoscopy with biopsies.  [facility]     HC UGI ENDOSCOPY, SIMPLE EXAM  7/1/2009    [facility].      UGI ENDOSCOPY, SIMPLE EXAM  11/11/2009    attempted upper GI, pt. could not tolerate procedure:[facility]     ORTHOPEDIC SURGERY  October 19,2011    diskectomy L4-L5       Social History     Tobacco Use     Smoking status: Current Every Day Smoker     Packs/day: 0.50     Years: 5.00     Pack years: 2.50     Types: Cigarettes     Smokeless tobacco: Never Used   Substance Use Topics     Alcohol use: Yes     Comment: Not since last July     Family  History   Problem Relation Age of Onset     Gastrointestinal Disease Brother         severe Crohn's     Neurologic Disorder Brother         Seizures post head injury     Depression Brother      Substance Abuse Brother      Genitourinary Problems Father         kidney stones     Diabetes Father      Heart Disease Father         Open heart surgery     Breast Cancer Maternal Grandmother      Parkinsonism Maternal Grandmother      Cerebrovascular Disease Paternal Grandmother      Cancer Maternal Grandfather         Lung     Cardiovascular Paternal Grandfather         Heart Attack     Substance Abuse Mother      Lung Cancer Paternal Uncle      Cancer Paternal Uncle      Lung Cancer Maternal Uncle          Current Outpatient Medications   Medication Sig Dispense Refill     acetaminophen (TYLENOL) 500 MG tablet Take 2 tablets (1,000 mg) by mouth 3 times daily as needed for mild pain 100 tablet 0     albuterol (2.5 MG/3ML) 0.083% neb solution Take 1 vial (2.5 mg) by nebulization every 6 hours as needed for shortness of breath / dyspnea or wheezing (Patient not taking: Reported on 2/28/2019) 50 vial 11     albuterol (PROAIR HFA/PROVENTIL HFA/VENTOLIN HFA) 108 (90 Base) MCG/ACT Inhaler Inhale 2 puffs into the lungs every 6 hours as needed for shortness of breath / dyspnea or wheezing 3 Inhaler 3     ARIPiprazole (ABILIFY) 15 MG tablet Take 1 tablet (15 mg) by mouth At Bedtime 90 tablet 3     busPIRone (BUSPAR) 15 MG tablet TK 1 T PO BID  5     cetirizine (ZYRTEC) 10 MG tablet Take 1 tablet (10 mg) by mouth daily (Patient not taking: Reported on 2/28/2019) 90 tablet 3     clonazePAM (KLONOPIN) 0.5 MG tablet TK 1/2 TO 1 T D PRA  5     diazepam (VALIUM) 10 MG tablet Take 1 tablet (10 mg) by mouth nightly as needed for anxiety or sleep (MUSCLE SPASM) (one month supply) 30 tablet 5     DULoxetine (CYMBALTA) 60 MG EC capsule Take 1 capsule (60 mg) by mouth daily 90 capsule 3     EPINEPHrine (EPIPEN/ADRENACLICK/OR ANY BX GENERIC  EQUIV) 0.3 MG/0.3ML injection 2-pack Inject 0.3 mLs (0.3 mg) into the muscle once as needed for anaphylaxis (Patient not taking: Reported on 2/28/2019) 0.6 mL 3     gabapentin (NEURONTIN) 300 MG capsule Take 1-3 capsules (300-900 mg) by mouth 3 times daily 270 capsule 5     levocetirizine (XYZAL) 5 MG tablet Take 1 tablet (5 mg) by mouth every evening 90 tablet 3     medical cannabis inhalation (Patient's own supply.  Not a prescription) Inhale into the lungs 3 times daily as needed Reported on 3/28/2017       medroxyPROGESTERone (DEPO-PROVERA) 150 MG/ML IM injection Inject 1 mL (150 mg) into the muscle every 3 months 3 mL 3     NONFORMULARY Apply 30 mLs topically 4 times daily       nystatin (MYCOSTATIN) cream nystatin 100,000 unit/gram topical cream       pantoprazole (PROTONIX) 40 MG EC tablet Take 1 tablet (40 mg) by mouth daily Take 30-60 minutes before a meal. (Patient taking differently: Take 40 mg by mouth 2 times daily Take 30-60 minutes before a meal.) 30 tablet 3     polyethylene glycol (MIRALAX) powder Take 17 g (1 capful) by mouth daily (Patient not taking: Reported on 2/28/2019) 510 g 1     prazosin (MINIPRESS) 1 MG capsule Take 1 mg by mouth At Bedtime  5     rizatriptan (MAXALT) 5 MG tablet Take 1-2 tablets (5-10 mg) by mouth at onset of headache for migraine May repeat in 2 hours. Max 6 tablets/24 hours. (Patient not taking: Reported on 2/28/2019) 18 tablet 3     rizatriptan (MAXALT-MLT) 5 MG ODT tab Take 1-2 tablets (5-10 mg) by mouth at onset of headache for migraine May repeat in 2 hours. Max 6 tablets/24 hours. (Patient not taking: Reported on 2/28/2019) 18 tablet 3     varenicline (CHANTIX ILAN) 0.5 MG X 11 & 1 MG X 42 tablet Take 0.5 mg tab daily for 3 days, THEN 0.5 mg tab twice daily for 4 days, THEN 1 mg twice daily. (Patient not taking: Reported on 2/28/2019) 53 tablet 0     cyclobenzaprine (FLEXERIL) 10 MG tablet Take 1 tablet (10 mg) by mouth 2 times daily as needed for muscle spasms or  other (back pain) 60 tablet 0     dicyclomine (BENTYL) 20 MG tablet Take 1 tablet (20 mg) by mouth 4 times daily as needed (ABDOMINAL CRAMPING) AS NEEDED FOR CRAMPING 60 tablet 0     ondansetron (ZOFRAN ODT) 4 MG ODT tab Take 1 tablet (4 mg) by mouth every 6 hours as needed for nausea 30 tablet 0     sucralfate (CARAFATE) 1 GM tablet TAKE 1 TABLET(1 GRAM) BY MOUTH FOUR TIMES DAILY 360 tablet 1     Allergies   Allergen Reactions     Ativan [Lorazepam] Hives     At the IV site     Baclofen      hives     Bees Hives, Swelling and Difficulty breathing     Caffeine      Contrast Dye      Hives,   Updated 5/10/2016 CT Contrast.     Iodine Hives     Methocarbamol Swelling     Metoclopramide      Other reaction(s): Tremors  LW Reaction: shaking/sweating     Midazolam      Other reaction(s): Agitation     Monosodium Glutamate      Morphine Other (See Comments)     Difficulty with urination     Nsaids Other (See Comments)     GI BLEED x2     Other (Do Not Use) Other (See Comments)     Xanaflex- pt becomes disoriented and loses bladder control     Reglan [Metoclopramide Hcl] Other (See Comments)     shaking     Soma [Carisoprodol] Visual Disturbance     Sleep walking     Tizanidine      Topamax Other (See Comments)     Topamax [Topiramate] Nausea     Tingling  GI/Vomit     Tramadol      Severe Headache, Seizure Risk     Tylenol W/Codeine [Acetaminophen-Codeine] Nausea and Itching     Tylenol 3     Valium Other (See Comments)     Becomes aggressive and angry     Versed Other (See Comments)     Zolmitriptan      Makes face feel like its twitching     Droperidol Anxiety     Flu Virus Vaccine Rash      Arm swelling     BP Readings from Last 3 Encounters:   02/28/19 114/70   02/22/19 126/80   02/12/19 137/86    Wt Readings from Last 3 Encounters:   02/28/19 79.8 kg (176 lb)   02/22/19 80.7 kg (178 lb)   02/12/19 81.2 kg (179 lb)                  Labs reviewed in EPIC    ROS:  Constitutional, HEENT, cardiovascular, pulmonary, GI,  ", musculoskeletal, neuro, skin, endocrine and psych systems are negative, except as otherwise noted.    OBJECTIVE:     /80 (BP Location: Left arm, Patient Position: Chair, Cuff Size: Adult Regular)   Pulse 115   Temp 98  F (36.7  C) (Temporal)   Resp 16   Ht 1.575 m (5' 2\")   Wt 80.7 kg (178 lb)   LMP 02/07/2019 (Approximate)   SpO2 98%   BMI 32.56 kg/m    Body mass index is 32.56 kg/m .   Physical Exam   Constitutional: She appears well-developed and well-nourished.   HENT:   Head: Normocephalic and atraumatic.   Cardiovascular: Normal rate, regular rhythm, normal heart sounds and intact distal pulses. Exam reveals no gallop and no friction rub.   No murmur heard.  Pulmonary/Chest: Effort normal and breath sounds normal.   Musculoskeletal: She exhibits tenderness.   Psychiatric: She has a normal mood and affect. Her behavior is normal. Judgment and thought content normal.         Diagnostic Test Results:  none     ASSESSMENT/PLAN:     Problem List Items Addressed This Visit     Chronic bilateral low back pain with left-sided sciatica - Primary    Relevant Medications    gabapentin (NEURONTIN) 300 MG capsule    varenicline (CHANTIX ILAN) 0.5 MG X 11 & 1 MG X 42 tablet      Other Visit Diagnoses     Screening for HIV (human immunodeficiency virus)        UTI symptoms        Relevant Orders    *UA reflex to Microscopic and Culture ([place] [facility] (except [place] and [place]) (Completed)    Encounter for tobacco use cessation counseling        Relevant Medications    varenicline (CHANTIX ILAN) 0.5 MG X 11 & 1 MG X 42 tablet    Viral URI with cough           recheck urinalysis to ensure resolution.  Trial chantix for smoking cessation  Trial  Gabapentin for chronic pain  Cough likely secondary to viral URI    Jessica Rios MD  [facility]  "

## 2019-02-19 NOTE — TELEPHONE ENCOUNTER
Reason for Call:  Same Day Appointment, Requested Provider:  any     PCP: Aura Rosario    Reason for visit: swollen tonsils green mucus repeat UA for cipro    Duration of symptoms: tonsils swelling 2 weeks green mucus yesterday     Have you been treated for this in the past? No    Additional comments: Is wondering if anyone would work her in today in [place]. Declined another location. Leave detailed message per patient.    Can we leave a detailed message on this number? YES    Phone number patient can be reached at: Home number on file [phone] (home)    Best Time: any    Call taken on 2/19/2019 at 7:28 AM by Kenzie Maria

## 2019-02-19 NOTE — TELEPHONE ENCOUNTER
Katy Islas is a 28 year old female who calls with symptoms.    NURSING ASSESSMENT:  Description: Patient has a sore throat, swollen tonsils, productive cough with green sputum.   Onset/duration:  1 month  Precip. factors:  Unknown - has not mentioned this at any of the ED visits per report  Associated symptoms: Would like her urine rechecked in regards to the UTI she is currently being treated for.   Last exam/Treatment:  2/12/19  Allergies:   Allergies   Allergen Reactions     Ativan [Lorazepam] Hives     At the IV site     Baclofen      hives     Bees Hives, Swelling and Difficulty breathing     Caffeine      Contrast Dye      Hives,   Updated 5/10/2016 CT Contrast.     Iodine Hives     Methocarbamol Swelling     Metoclopramide      Other reaction(s): Tremors  LW Reaction: shaking/sweating     Midazolam      Other reaction(s): Agitation     Monosodium Glutamate      Morphine Other (See Comments)     Difficulty with urination     Nsaids Other (See Comments)     GI BLEED x2     Other (Do Not Use) Other (See Comments)     Xanaflex- pt becomes disoriented and loses bladder control     Reglan [Metoclopramide Hcl] Other (See Comments)     shaking     Soma [Carisoprodol] Visual Disturbance     Sleep walking     Tizanidine      Topamax Other (See Comments)     Topamax [Topiramate] Nausea     Tingling  GI/Vomit     Tramadol      Severe Headache, Seizure Risk     Tylenol W/Codeine [Acetaminophen-Codeine] Nausea and Itching     Tylenol 3     Valium Other (See Comments)     Becomes aggressive and angry     Versed Other (See Comments)     Zolmitriptan      Makes face feel like its twitching     Droperidol Anxiety     Flu Virus Vaccine Rash      Arm swelling       RECOMMENDED DISPOSITION:  See in 24 hours  Will comply with recommendation: no - declines another location.   If further questions/concerns or if Sx do not improve, worsen or new Sx develop, call your PCP or [facility] as soon as possible.    NOTES:   Disposition was determined by the first positive assessment question, therefore all previous assessment questions were negative.     Guideline used:  Telephone Triage Protocols for Nurses, Fifth Edition, Telma Roy, RN, BSN

## 2019-02-22 ENCOUNTER — OFFICE VISIT (OUTPATIENT)
Dept: FAMILY MEDICINE | Facility: OTHER | Age: 29
End: 2019-02-22
Payer: MEDICARE

## 2019-02-22 VITALS
HEIGHT: 62 IN | DIASTOLIC BLOOD PRESSURE: 80 MMHG | SYSTOLIC BLOOD PRESSURE: 126 MMHG | RESPIRATION RATE: 16 BRPM | BODY MASS INDEX: 32.76 KG/M2 | TEMPERATURE: 98 F | HEART RATE: 115 BPM | OXYGEN SATURATION: 98 % | WEIGHT: 178 LBS

## 2019-02-22 DIAGNOSIS — J06.9 VIRAL URI WITH COUGH: ICD-10-CM

## 2019-02-22 DIAGNOSIS — R39.9 UTI SYMPTOMS: ICD-10-CM

## 2019-02-22 DIAGNOSIS — G89.29 CHRONIC BILATERAL LOW BACK PAIN WITH LEFT-SIDED SCIATICA: Primary | ICD-10-CM

## 2019-02-22 DIAGNOSIS — M54.42 CHRONIC BILATERAL LOW BACK PAIN WITH LEFT-SIDED SCIATICA: Primary | ICD-10-CM

## 2019-02-22 DIAGNOSIS — Z71.6 ENCOUNTER FOR TOBACCO USE CESSATION COUNSELING: ICD-10-CM

## 2019-02-22 DIAGNOSIS — Z11.4 SCREENING FOR HIV (HUMAN IMMUNODEFICIENCY VIRUS): ICD-10-CM

## 2019-02-22 LAB
ALBUMIN UR-MCNC: NEGATIVE MG/DL
APPEARANCE UR: CLEAR
BILIRUB UR QL STRIP: NEGATIVE
COLOR UR AUTO: YELLOW
GLUCOSE UR STRIP-MCNC: NEGATIVE MG/DL
HGB UR QL STRIP: NEGATIVE
KETONES UR STRIP-MCNC: NEGATIVE MG/DL
LEUKOCYTE ESTERASE UR QL STRIP: NEGATIVE
NITRATE UR QL: NEGATIVE
PH UR STRIP: 6.5 PH (ref 5–7)
SOURCE: NORMAL
SP GR UR STRIP: 1.01 (ref 1–1.03)
UROBILINOGEN UR STRIP-ACNC: 0.2 EU/DL (ref 0.2–1)

## 2019-02-22 PROCEDURE — 81003 URINALYSIS AUTO W/O SCOPE: CPT | Performed by: FAMILY MEDICINE

## 2019-02-22 PROCEDURE — 99213 OFFICE O/P EST LOW 20 MIN: CPT | Performed by: FAMILY MEDICINE

## 2019-02-22 RX ORDER — PREDNISONE 20 MG/1
40 TABLET ORAL DAILY
Qty: 10 TABLET | Refills: 0 | Status: SHIPPED | OUTPATIENT
Start: 2019-02-22 | End: 2019-04-11

## 2019-02-22 RX ORDER — GABAPENTIN 300 MG/1
300-900 CAPSULE ORAL 3 TIMES DAILY
Qty: 270 CAPSULE | Refills: 5 | Status: SHIPPED | OUTPATIENT
Start: 2019-02-22 | End: 2020-01-03

## 2019-02-22 ASSESSMENT — MIFFLIN-ST. JEOR: SCORE: 1485.65

## 2019-02-25 ENCOUNTER — OFFICE VISIT (OUTPATIENT)
Dept: AUDIOLOGY | Facility: OTHER | Age: 29
End: 2019-02-25
Payer: MEDICARE

## 2019-02-25 DIAGNOSIS — H90.3 SENSORINEURAL HEARING LOSS, BILATERAL: Primary | ICD-10-CM

## 2019-02-25 PROCEDURE — V5275 EAR IMPRESSION: HCPCS | Mod: RT | Performed by: AUDIOLOGIST

## 2019-02-25 PROCEDURE — 99207 ZZC NO CHARGE LOS: CPT | Performed by: AUDIOLOGIST

## 2019-02-25 PROCEDURE — V5266 BATTERY FOR HEARING DEVICE: HCPCS | Performed by: AUDIOLOGIST

## 2019-02-26 NOTE — PROGRESS NOTES
SUBJECTIVE:   Katy Islas is a 28 year old female who presents to clinic today for the following health issues:      History of Present Illness     Diet:  Regular (no restrictions)  Taking medications regularly:  Yes  Medication side effects:  Not applicable  Additional concerns today:  No      Patient wanted suture removal on the right arm. Patient have the suture for 1.5 week have now 2/18/19, pt had a mole removal.   ED/UC Followup:    Facility:  [facility]; patient is waiting for results.   Date of visit: 2/27/19  Reason for visit: vomiting  Current Status: tired, low back pain, pelvic pain achy     Patient is here in clinic with multiple concerns. She was actually seen in the ER yesterday due to vomiting of blood and coffee ground emesis. She has had resolution of the vomiting but continues to have epigastric pain and heartburn symptoms. In addition she would like to discuss the results of her vaginal swab as she has been having some brownish discharge. She does note that she is due for her Depo injection and is wondering if that is why she is having discharge. Lastly she would like sutures removed from her right arm where she had a mole removed.      Problem list and histories reviewed & adjusted, as indicated.  Additional history: as documented    BP Readings from Last 3 Encounters:   02/28/19 114/70   02/22/19 126/80   02/12/19 137/86    Wt Readings from Last 3 Encounters:   02/28/19 79.8 kg (176 lb)   02/22/19 80.7 kg (178 lb)   02/12/19 81.2 kg (179 lb)        ROS:  Constitutional, HEENT, cardiovascular, pulmonary, gi and gu systems are negative, except as otherwise noted.    OBJECTIVE:     /70   Pulse 109   Temp 98.6  F (37  C) (Oral)   Wt 79.8 kg (176 lb)   LMP 02/07/2019 (Approximate)   SpO2 94%   BMI 32.19 kg/m    Body mass index is 32.19 kg/m .  GENERAL: alert and no distress  EYES: Eyes grossly normal to inspection, PERRL and conjunctivae and sclerae normal  NECK: no  adenopathy, no asymmetry, masses, or scars and thyroid normal to palpation  RESP: lungs clear to auscultation - no rales, rhonchi or wheezes  CV: regular rates and rhythm, peripheral pulses strong and no peripheral edema  ABDOMEN: tenderness epigastric and bowel sounds normal  MS: no gross musculoskeletal defects noted, no edema  SKIN: no suspicious lesions or rashes    Diagnostic Test Results:  Labs from ER visit reviewed and stable    ASSESSMENT/PLAN:       ICD-10-CM    1. Hematemesis with nausea K92.0 sucralfate (CARAFATE) 1 GM tablet     GASTROENTEROLOGY ADULT REF CONSULT ONLY   2. Migraine with aura and without status migrainosus, not intractable G43.109 ondansetron (ZOFRAN ODT) 4 MG ODT tab   3. Hx of Crohn's disease Z87.19 dicyclomine (BENTYL) 20 MG tablet     GASTROENTEROLOGY ADULT REF CONSULT ONLY   4. Chronic bilateral low back pain with left-sided sciatica M54.42 cyclobenzaprine (FLEXERIL) 10 MG tablet    G89.29    5. Acute gastritis with hemorrhage, unspecified gastritis type K29.01 sucralfate (CARAFATE) 1 GM tablet     GASTROENTEROLOGY ADULT REF CONSULT ONLY   6. Dysmenorrhea N94.6 medroxyPROGESTERone (DEPO-PROVERA) injection 150 mg     INJECTION INTRAMUSCULAR OR SUB-Q       I discussed concern for GI bleed with patient, She is stable but I will add Carafate to help with epigastric pain and if acute bleeding would have her return to the ER. I will also reorder GI referral and we will help her to get that scheduled. I encouraged her to avoid NSAIDS, smoking and other foods that can increase symptoms.   For vaginal concerns I do believe that this is due to her being at the end of her Depo cycle, there was no evidence of infection based on lab work done in the ER, pregnancy test in the ER was negative. We will give her a Depo injection and I would have her follow up with gynecology if symptoms are persisting.   Nursing removed suture from biopsy site, see note.   Patient will follow up as needed if new or  worsening symptoms.   See Patient Instructions    Viola Villegas PA-C  [facility]

## 2019-02-27 ENCOUNTER — TELEPHONE (OUTPATIENT)
Dept: FAMILY MEDICINE | Facility: OTHER | Age: 29
End: 2019-02-27

## 2019-02-27 ENCOUNTER — TRANSFERRED RECORDS (OUTPATIENT)
Dept: HEALTH INFORMATION MANAGEMENT | Facility: CLINIC | Age: 29
End: 2019-02-27

## 2019-02-27 LAB
ALT SERPL-CCNC: 21 U/L (ref 8–45)
AST SERPL-CCNC: 22 U/L (ref 5–41)
CREAT SERPL-MCNC: 0.87 MG/DL (ref 0.57–1.11)
GFR SERPL CREATININE-BSD FRML MDRD: >60 ML/MIN/1.73M2
GLUCOSE SERPL-MCNC: 85 MG/DL (ref 70–100)
POTASSIUM SERPL-SCNC: 4.3 MMOL/L (ref 3.5–5.1)

## 2019-02-27 NOTE — TELEPHONE ENCOUNTER
Katy Islas is a 28 year old female who calls with blood in vomit.    NURSING ASSESSMENT:  Description:  I spoke with the pt who states she has had heartburn all morning and vomited. Saw some blood in it. Has referral to [facility] but has not heard back from them to schedule.  vomited 3x in 1 hour. Blood all three times. Bright red the first time and the second time it looked like coffee dark. Taking the prednisone but has not started the Chantix.   Onset/duration:  Yesterday   Improves/worsens symptoms:  Protonix and some tums with no relief     Allergies:   Allergies   Allergen Reactions     Ativan [Lorazepam] Hives     At the IV site     Baclofen      hives     Bees Hives, Swelling and Difficulty breathing     Caffeine      Contrast Dye      Hives,   Updated 5/10/2016 CT Contrast.     Iodine Hives     Methocarbamol Swelling     Metoclopramide      Other reaction(s): Tremors  LW Reaction: shaking/sweating     Midazolam      Other reaction(s): Agitation     Monosodium Glutamate      Morphine Other (See Comments)     Difficulty with urination     Nsaids Other (See Comments)     GI BLEED x2     Other (Do Not Use) Other (See Comments)     Xanaflex- pt becomes disoriented and loses bladder control     Reglan [Metoclopramide Hcl] Other (See Comments)     shaking     Soma [Carisoprodol] Visual Disturbance     Sleep walking     Tizanidine      Topamax Other (See Comments)     Topamax [Topiramate] Nausea     Tingling  GI/Vomit     Tramadol      Severe Headache, Seizure Risk     Tylenol W/Codeine [Acetaminophen-Codeine] Nausea and Itching     Tylenol 3     Valium Other (See Comments)     Becomes aggressive and angry     Versed Other (See Comments)     Zolmitriptan      Makes face feel like its twitching     Droperidol Anxiety     Flu Virus Vaccine Rash      Arm swelling     RECOMMENDED DISPOSITION:  To ED, Pt wants to go to [facility] ED since that is where she has been trying to get her GI appt.  Will comply with  recommendation: Yes  If further questions/concerns or if symptoms do not improve, worsen or new symptoms develop, call your PCP or [facility] as soon as possible.      Guideline used: vomiting, adult  Telephone Triage Protocols for Nurses, Fifth Edition, Telma Jimenez RN

## 2019-02-28 ENCOUNTER — OFFICE VISIT (OUTPATIENT)
Dept: FAMILY MEDICINE | Facility: CLINIC | Age: 29
End: 2019-02-28
Payer: MEDICARE

## 2019-02-28 VITALS
OXYGEN SATURATION: 94 % | TEMPERATURE: 98.6 F | HEART RATE: 109 BPM | BODY MASS INDEX: 32.19 KG/M2 | SYSTOLIC BLOOD PRESSURE: 114 MMHG | WEIGHT: 176 LBS | DIASTOLIC BLOOD PRESSURE: 70 MMHG

## 2019-02-28 DIAGNOSIS — G43.109 MIGRAINE WITH AURA AND WITHOUT STATUS MIGRAINOSUS, NOT INTRACTABLE: ICD-10-CM

## 2019-02-28 DIAGNOSIS — G89.29 CHRONIC BILATERAL LOW BACK PAIN WITH LEFT-SIDED SCIATICA: ICD-10-CM

## 2019-02-28 DIAGNOSIS — K92.0 HEMATEMESIS WITH NAUSEA: Primary | ICD-10-CM

## 2019-02-28 DIAGNOSIS — Z87.19 HX OF CROHN'S DISEASE: ICD-10-CM

## 2019-02-28 DIAGNOSIS — K29.01 ACUTE GASTRITIS WITH HEMORRHAGE, UNSPECIFIED GASTRITIS TYPE: ICD-10-CM

## 2019-02-28 DIAGNOSIS — N94.6 DYSMENORRHEA: ICD-10-CM

## 2019-02-28 DIAGNOSIS — K92.0 HEMATEMESIS WITH NAUSEA: ICD-10-CM

## 2019-02-28 DIAGNOSIS — M54.42 CHRONIC BILATERAL LOW BACK PAIN WITH LEFT-SIDED SCIATICA: ICD-10-CM

## 2019-02-28 PROCEDURE — 96372 THER/PROPH/DIAG INJ SC/IM: CPT | Performed by: PHYSICIAN ASSISTANT

## 2019-02-28 PROCEDURE — 99214 OFFICE O/P EST MOD 30 MIN: CPT | Mod: 25 | Performed by: PHYSICIAN ASSISTANT

## 2019-02-28 RX ORDER — DICYCLOMINE HCL 20 MG
20 TABLET ORAL 4 TIMES DAILY PRN
Qty: 60 TABLET | Refills: 0 | Status: SHIPPED | OUTPATIENT
Start: 2019-02-28 | End: 2019-06-11

## 2019-02-28 RX ORDER — CYCLOBENZAPRINE HCL 10 MG
10 TABLET ORAL 2 TIMES DAILY PRN
Qty: 60 TABLET | Refills: 0 | Status: SHIPPED | OUTPATIENT
Start: 2019-02-28 | End: 2019-08-05

## 2019-02-28 RX ORDER — ONDANSETRON 4 MG/1
4 TABLET, ORALLY DISINTEGRATING ORAL EVERY 6 HOURS PRN
Qty: 30 TABLET | Refills: 0 | Status: SHIPPED | OUTPATIENT
Start: 2019-02-28 | End: 2019-05-13

## 2019-02-28 RX ORDER — MEDROXYPROGESTERONE ACETATE 150 MG/ML
150 INJECTION, SUSPENSION INTRAMUSCULAR ONCE
Status: COMPLETED | OUTPATIENT
Start: 2019-02-28 | End: 2019-02-28

## 2019-02-28 RX ORDER — SUCRALFATE 1 G/1
1 TABLET ORAL 4 TIMES DAILY
Qty: 120 TABLET | Refills: 1 | Status: SHIPPED | OUTPATIENT
Start: 2019-02-28 | End: 2019-02-28

## 2019-02-28 RX ADMIN — MEDROXYPROGESTERONE ACETATE 150 MG: 150 INJECTION, SUSPENSION INTRAMUSCULAR at 10:00

## 2019-02-28 ASSESSMENT — ANXIETY QUESTIONNAIRES
4. TROUBLE RELAXING: NOT AT ALL
GAD7 TOTAL SCORE: 4
3. WORRYING TOO MUCH ABOUT DIFFERENT THINGS: SEVERAL DAYS
7. FEELING AFRAID AS IF SOMETHING AWFUL MIGHT HAPPEN: NOT AT ALL
GAD7 TOTAL SCORE: 4
5. BEING SO RESTLESS THAT IT IS HARD TO SIT STILL: NOT AT ALL
2. NOT BEING ABLE TO STOP OR CONTROL WORRYING: SEVERAL DAYS
GAD7 TOTAL SCORE: 4
7. FEELING AFRAID AS IF SOMETHING AWFUL MIGHT HAPPEN: NOT AT ALL
6. BECOMING EASILY ANNOYED OR IRRITABLE: SEVERAL DAYS
1. FEELING NERVOUS, ANXIOUS, OR ON EDGE: SEVERAL DAYS

## 2019-02-28 ASSESSMENT — PATIENT HEALTH QUESTIONNAIRE - PHQ9
SUM OF ALL RESPONSES TO PHQ QUESTIONS 1-9: 3
10. IF YOU CHECKED OFF ANY PROBLEMS, HOW DIFFICULT HAVE THESE PROBLEMS MADE IT FOR YOU TO DO YOUR WORK, TAKE CARE OF THINGS AT HOME, OR GET ALONG WITH OTHER PEOPLE: NOT DIFFICULT AT ALL
SUM OF ALL RESPONSES TO PHQ QUESTIONS 1-9: 3

## 2019-02-28 ASSESSMENT — PAIN SCALES - GENERAL: PAINLEVEL: MODERATE PAIN (4)

## 2019-02-28 NOTE — NURSING NOTE
Prior to injection, verified patient identity using patient's name and date of birth.  Due to injection administration, patient instructed to remain in clinic for 15 minutes  afterwards, and to report any adverse reaction to me immediately.    BP: 114/70    LAST PAP/EXAM:   Lab Results   Component Value Date    PAP NIL 04/25/2017     URINE HCG:not indicated    NEXT INJECTION DUE: 5/16/19 - 5/30/19       Drug Amount Wasted:  None.  Vial/Syringe: Single dose vial  Expiration Date:  20/20    Moisés Bazan MA

## 2019-02-28 NOTE — PROGRESS NOTES
Katy Islas presents to the clinic today for removal of suture.  The patient has had the suture in place form outside facility (unknown days) Per SM to remove today.  There has been no history of infection or drainage.  1 mattress suture are seen located on the right forearm near the elbow.  The wound is healing well with no signs of infection.  Tetanus status is up to date.   All suture were easily removed today.  Routine wound care discussed.  The patient will follow up as needed.    Nohemi Jennings RN, BSN

## 2019-02-28 NOTE — PATIENT INSTRUCTIONS
I will have you start Carafate for stomach inflammation Vs. Ulcer. Gastroenterology should contact you to schedule an appointment. If vaginal discharge does not resolve with your Depo injection I would have you follow up with gynecology for further evaluation.   If recurrence of vomiting blood or other acute worsening of symptoms you should return to the ER.     Patient Education     Gastritis or Ulcer, No Antibiotic Treatment    Gastritis is irritation and inflammation of the stomach lining. This means the lining is red and swollen. It can cause shallow sores in the stomach lining called erosions. An ulcer is a deeper open sore in the lining of the stomach. It may also occur in the first part of the small intestine (duodenum). The causes and symptoms of gastritis and ulcers are very similar.  Causes and risk factors for both problems can include:    Long-term use of nonsteroidal anti-inflammatory drugs (NSAIDs), such as aspirin and ibuprofen    H. pylori bacteria infection    Tobacco use    Alcohol use    Certain other conditions such as immune disorders, certain medicines (high-dose iron supplements) and street drugs (such as cocaine)  Symptoms for both problems can include:    Dull or burning pain in the upper part of the belly    Loss of appetite    Heartburn or upset stomach    Frequent burping    Bloated feeling    Nausea with or without vomiting  You likely had an evaluation to help find the exact cause and extent of your problem. This may have included a health history, exam, and certain tests.  Results showed that your problem is not due to H. pylori infection. For this reason, you don't need antibiotics as part of your treatment.  Whether your problem is gastritis or an ulcer, you will still need to take other medicines, however. You will also need to follow instructions to help reduce stomach irritation so your stomach can heal.   Home care    Take any medicines you re prescribed exactly as directed.  Common medicines used to treat gastritis include:  ? Antacids. These help neutralize the normal acids in your stomach.  ? Proton pump inhibitors. These block your stomach from making any acid.  ? H2 blockers. These reduce the amount of acid your stomach makes.  ? Bismuth subsalicylate. This helps protect the lining of your stomach from acid.    Don't take any NSAIDs during your treatment. If you take NSAID to help treat other health problems, tell your healthcare provider. He or she may need to adjust your medicine plan or change the dosage.    Don t use tobacco. Also don t drink alcohol. These products can increase the amount of acid your stomach makes. This can delay healing. It can also worsen symptoms.  Follow-up care  Follow up with your healthcare provider, or as advised. In some cases, more testing may be needed.  When to seek medical advice  Call your healthcare provider right away if any of these occur:    Fever of 100.4 F (38 C) or higher, or as directed by your healthcare provider    Stomach pain that worsens or moves to the lower right part of belly    Extreme fatigue    Weakness or dizziness    Continued weight loss    Frequent vomiting, blood in your vomit, or coffee ground-like substance in your vomit    Black, tarry, or bloody stools  Call 911  Call 911 if any of these occur:    Chest pain appears or worsens, or spreads to the back, neck, shoulder, or arm    Unusually fast heart rate    Trouble breathing or swallowing    Confusion    Extreme drowsiness or trouble waking up    Fainting    Large amounts of blood present in vomit or stool  Date Last Reviewed: 3/1/2018    0319-8278 [employer]. [address] [place] [address], [place] [address]. All rights reserved. This information is not intended as a substitute for professional medical care. Always follow your healthcare professional's instructions.

## 2019-02-28 NOTE — TELEPHONE ENCOUNTER
"Requested Prescriptions   Pending Prescriptions Disp Refills     sucralfate (CARAFATE) 1 GM tablet [Pharmacy Med Name: SUCRALFATE 1GM TABLETS] 360 tablet 1     Sig: TAKE 1 TABLET(1 GRAM) BY MOUTH FOUR TIMES DAILY    Miscellaneous Gastrointestinal Agents Passed - 2/28/2019 12:22 PM       Passed - Recent (12 mo) or future (30 days) visit within the authorizing provider's specialty    Patient had office visit in the last 12 months or has a visit in the next 30 days with authorizing provider or within the authorizing provider's specialty.  See \"Patient Info\" tab in inbasket, or \"Choose Columns\" in Meds & Orders section of the refill encounter.             Passed - Medication is active on med list       Passed - Patient is 18 years of age or older        sucralfate (CARAFATE) 1 GM tablet  Routing refill request to provider for review/approval because:  Provider to review, requesting 90 day supply  Rx was sent 02/28/2019 for 120 tabs and 1 refills.     Nohemi Jennings, RN, BSN       "

## 2019-02-28 NOTE — TELEPHONE ENCOUNTER
Received an e refill request for Sucralfate 1gm    Requesting a 90 day supply    Approved 02/2/2019 #120 x 1 from 02/28/2019 office visit

## 2019-03-01 RX ORDER — SUCRALFATE 1 G/1
TABLET ORAL
Qty: 360 TABLET | Refills: 1 | Status: SHIPPED | OUTPATIENT
Start: 2019-03-01 | End: 2019-04-03

## 2019-03-01 ASSESSMENT — PATIENT HEALTH QUESTIONNAIRE - PHQ9: SUM OF ALL RESPONSES TO PHQ QUESTIONS 1-9: 3

## 2019-03-01 ASSESSMENT — ANXIETY QUESTIONNAIRES: GAD7 TOTAL SCORE: 4

## 2019-03-11 ENCOUNTER — TELEPHONE (OUTPATIENT)
Dept: FAMILY MEDICINE | Facility: OTHER | Age: 29
End: 2019-03-11

## 2019-03-11 DIAGNOSIS — H90.3 SENSORINEURAL HEARING LOSS, BILATERAL: Primary | ICD-10-CM

## 2019-03-11 NOTE — TELEPHONE ENCOUNTER
"Pt states [facility] is not excepting outside referrals.     Katy Islas is a 28 year old female who calls with blood in vomit and diarrhea.    NURSING ASSESSMENT:  Description:  I spoke with the pt who states she is vomiting again, 3 times. Bright red blood and bile. When she swallows it hurts. When she went to the bathroom there was some bright red blood. Stool was soft.  \"average\" amount of blood. History of hemorrhoids. Hurts in upper stomach. Feels like her heart is beating fast, \"not beating normal\". Feels tired.   Onset/duration:  This morning  Precip. factors:  Crohns  Improves/worsens symptoms:  Was taking Carafate which is helping.     Allergies:   Allergies   Allergen Reactions     Ativan [Lorazepam] Hives     At the IV site     Baclofen      hives     Bees Hives, Swelling and Difficulty breathing     Caffeine      Contrast Dye      Hives,   Updated 5/10/2016 CT Contrast.     Iodine Hives     Methocarbamol Swelling     Metoclopramide      Other reaction(s): Tremors  LW Reaction: shaking/sweating     Midazolam      Other reaction(s): Agitation     Monosodium Glutamate      Morphine Other (See Comments)     Difficulty with urination     Nsaids Other (See Comments)     GI BLEED x2     Other (Do Not Use) Other (See Comments)     Xanaflex- pt becomes disoriented and loses bladder control     Reglan [Metoclopramide Hcl] Other (See Comments)     shaking     Soma [Carisoprodol] Visual Disturbance     Sleep walking     Tizanidine      Topamax Other (See Comments)     Topamax [Topiramate] Nausea     Tingling  GI/Vomit     Tramadol      Severe Headache, Seizure Risk     Tylenol W/Codeine [Acetaminophen-Codeine] Nausea and Itching     Tylenol 3     Valium Other (See Comments)     Becomes aggressive and angry     Versed Other (See Comments)     Zolmitriptan      Makes face feel like its twitching     Droperidol Anxiety     Flu Virus Vaccine Rash      Arm swelling       RECOMMENDED DISPOSITION:  To ED, another " person to drive - Report called to  ED per pts request  Will comply with recommendation: Yes  If further questions/concerns or if symptoms do not improve, worsen or new symptoms develop, call your PCP or [facility] as soon as possible.      Guideline used: stools, abnormal, vomiting  Telephone Triage Protocols for Nurses, Fifth Edition, Telma Jimenez RN

## 2019-03-12 ENCOUNTER — TELEPHONE (OUTPATIENT)
Dept: FAMILY MEDICINE | Facility: OTHER | Age: 29
End: 2019-03-12

## 2019-03-12 NOTE — TELEPHONE ENCOUNTER
Reason for Call:  Form, our goal is to have forms completed with 72 hours, however, some forms may require a visit or additional information.    Type of letter, form or note:  [employer]     Who is the form from?: [facility] (if other please explain)    Where did the form come from: form was mailed in    What clinic location was the form placed at?: [facility] - [phone]    Where the form was placed: 's Box    What number is listed as a contact on the form?:        Additional comments: fax to [phone]    Call taken on 3/12/2019 at 11:52 AM by Michelle Vega

## 2019-03-18 ENCOUNTER — OFFICE VISIT (OUTPATIENT)
Dept: AUDIOLOGY | Facility: CLINIC | Age: 29
End: 2019-03-18
Attending: OTOLARYNGOLOGY
Payer: MEDICARE

## 2019-03-18 DIAGNOSIS — H90.3 SENSORINEURAL HEARING LOSS, BILATERAL: ICD-10-CM

## 2019-03-18 DIAGNOSIS — H90.3 SENSORY HEARING LOSS, BILATERAL: Primary | ICD-10-CM

## 2019-03-18 NOTE — PROGRESS NOTES
AUDIOLOGY REPORT    PURPOSE: To evaluate candidacy for cochlear implant (CI). Candidacy requires at least a moderate to profound sensorineural hearing loss in both ears, good general health, lack of benefit from conventional amplification as determined from the tests below, psychological/emotional stability and motivationally suitable, with realistic expectations, and absence of medical conditions contraindicating surgical intervention.     BACKGROUND:  Katy Islas was seen on 3/18/2019 in Audiology at the [facility] for a cochlear implant evaluation, which was ordered by Dr. Martha Tariq and was recommended by Delfino Tenorio MA, Audiologist at [facility].  She was accompanied by Loida Quinonez, staff American Sign Language (ASL) .      Katy Islas has been diagnosed with congenital profound sensorineural hearing loss bilaterally.  She reports her left ear dropped rather suddenly 5-6+ years ago and she has not worn a left hearing aid since that time.  Patient reports that she had imaging done during early childhood and believes they were told that there may have been an abnormal middle or inner ear anatomy on the left side.  She has been noting difficulty seeing at night but reports she was evaluated for Usher's and was told she does not have it.      Onset of hearing loss: Birth  Identification of hearing loss: Age 1 bilaterally  Onset of profound hearing loss: Birth  Etiology of hearing loss: Unknown - Patient wonders if it is related to her father's exposure to agent orange in [place].    Age Hearing Aid fit: Age 1 bilaterally  Duration of Hearing Aid use: Right - consistently since age 1; Left - discontinued 5-6+ years ago due to lack of benefit  Current Hearing Aid Type: Right - Phonak Deena V50-UP  Age of current Hearing Aid: Approximately 1 year  Tinnitus: Present bilaterally occasionally but not bothersome  Balance: Off  balance - patient attributes this to her back (multiple surgeries) though she notes she was late at all gross motor milestones in infancy/early childhood (late to sit, crawl and walk)  Other:  Migraines (takes off hearing aid when this occurs), Crohn's Disease, Asthma, Fibromyalgia/Chronic pain, Past addiction to pain medication per patient report   Does patient exhibit intelligible vocalizations?  Not during appointment - she reports a few individuals can understand her speech  Primary Mode of Communication: American Sign Language, Patient reports she tries to lipread people who do not sign.   Previous Surgeries: Gall bladder, Broken tail bone, Multiple back surgeries  Previous Ear Surgeries: None reported    Fall Risk Screen:  1. Have you fallen two or more times in the past year? Yes but falls were related to ice, not balance per patient  2. Have you fallen and had an injury in the past year? No  Is patient a fall risk? Yes  Referral initiated: Yes - this will be assessed when she sees ENT Clinic for further discussion of cochlear implant candidacy  Fall Risk Assessment Completed by Audiology.    TEST RESULTS:    Audiometric Results (see audiogram):   Right: Profound sensorineural hearing loss (measurable thresholds at 250, 750, 1000 and 2000 Hz near equipment limits)  Left: No measurable hearing (profound sensorineural hearing loss) - Patient reported 250 Hz response was vibrotactile.  SDT attempted and patient reported discomfort/vibration at equipment limits but no sound.    Unmasked bone conduction responses were vibrotactile at 250-1000 Hz.    Re: 2/1/17 - Right ear was stable.  Left ear had some responses at 250-2000 Hz near equipment limits in 2017 but had none today.      Tympanograms: normal eardrum mobility bilaterally  Acoustic Reflexes: Absent bilaterally  Otoacoustic emissions: Refer bilaterally    Device(s) used for Aided Testing:   Right ear: Patient's own Phonak Deena V50-UP with personal  earmold  Left ear: Clinic loaner Phonak Deena V UP with Comply tip    Electroacoustic Test Results: The patient's right hearing aid was working up to specifications on an electroacoustic test.  Due to the severity of the loss targets were not met and could not be met with a loaner either, so the patient's own aid was used for testing.     A left loaner was set since the patient does not have a left hearing aid of her own.  Targets could not be met due to severity of hearing loss. The loaner aid was working up to specifications.    NOTE: Patient was charged for left electroacoustic check for the loaner aid fitting.  No charge was placed for right hearing aid because of warranty status at [facility].      45 minutes were spent assessing the patient s auditory rehabilitation status in order to determine whether conventional amplification is beneficial or whether the patient is an audiologic candidate for a cochlear implant.      Soundfield Thresholds (see audiogram):   Left aided: No detection of sound  Right aided: Detection in moderate to profound loss range    CNC Words Test: The patient repeats 25 single syllable words, auditory only. The words are presented at 60 dB A (conversational level) delivered from a CD player.     Left ear aided: 0%  Right ear aided: 0%  Bilaterally aided: 0%    AzBio Sentences Test: The patient repeats 20 sentences, auditory only.  The sentences are presented at 60 dB A (conversational level) delivered from a CD player.       Left ear aided: 0%  Right ear aided: 0%  Bilaterally aided: 0%    MAC Prelingual Battery was also completed at 60 dB HL in soundfield with the right hearing aid (left not tested since no aided detection of sound):  Noise/Voice with right hearing aid: 75%  Spondee Same/Different with right hearing aid: 40% - chance performance  4 Choice Spondee with right hearing aid: 25% - chance performance    EAR RECOMMENDED FOR IMPLANTATION: right    REASON: The patient is  meeting the audiological criteria for a cochlear implant.  The right ear is preferred because she has detection of sound and the ear has been consistently aided. The left ear is a poorer audiological candidate because she has no detection of sound in the unaided or aided conditions and because the ear has not been recently aided.  I explained to the patient that we could do a promontory stimulation test should she wish to have her left ear considered for surgery but she is not interested due to the discomfort she noted just from loud acoustic stimulation today.  She is nervous about considering her right/better hearing ear for implantation but is open to completing the full work up to learn more.        COMMENTS: The following topics were discussed with the patient:   -How a cochlear implant functions   -Realistic expectations for performance outcomes given the patient's prelingual deafness.  We would expect improved sound awareness, which is important for safety and would help her lipreading.  Open set speech understanding is not expected given the patient's prelingual deafness.  Patient expressed understanding.      -The adjustment process.  Patient understood that adjustment to the CI takes at least 3-6 months and may take longer, given her length of deafness.   -The possible benefits, risks, and limitations of cochlear implants   -The necessary follow up visits in Audiology and the recommendation for aural rehabilitation therapy.  Patient expressed willingness to participate in therapy.      Brochures were provided on the 3 available cochlear implant devices.  These will be reviewed at the patient's next visit, after imaging and consultation with the surgeon.  Patient reports she may need to consider MRI compatibility due to history of multiple surgeries on her back and the need to monitor.       Patient reports she is looking into insurance options as her secondary lapsed and she currently has only Medicare.       SUMMARY AND RECOMMENDATIONS: Katy is meeting the audiological criteria for a cochlear implant and the right ear is preferred audiologically since it has been consistently aided and still has some measurable hearing in the unaided and aided conditions.  The left ear has not been recently aided and has not measurable hearing in the unaided or aided conditions.  Patient is not interested in promontory stimulation to assess candidacy of left ear further.  She would like to give further consideration to the possibility of a right cochlear implant and would like to move forward with the rest of the team evaluation.  I will have Dr. Tariq review the patient's records to determine what balance tests and imaging would be recommended before Dr. Tariq' consultation.  In addition to cochlear implant candidacy, the patient would also like to discuss with the Dr. Tariq the possible causes for the hearing loss (she questions abnormal imaging during childhood, father's possible agent orange exposure), possible referral for genetic work up, and concerns about vision at night and how that might relate to etiology.  I will plan to see the patient back after the consultation with Dr. Tariq to select a cochlear implant device, if the patient elects to move forward with surgery.  The patient expressed understanding and agreement with this plan.      Thank you for referring this patient  It was a pleasure meeting Katy today.  Please contact me at [phone] with questions regarding today's results or recommendations or if I can assist further in her care.        Michelle Romero, CCC-A  Licensed Audiologist  [place] #[id number]    Enclosure: audiogram    Cc Martha Tariq MD   Cc Delfino Tenorio MA - Audiologist at [facility]       ADDENDUM 3/20/19: Dr. Tariq reviewed findings and did not feel balance testing would be needed before she saw the patient.   was notified and will coordinate imaging, NCO and device selection  visits.      Michelle Romero, CCC-A  Licensed Audiologist  [place] #[id number]

## 2019-03-20 DIAGNOSIS — H91.90 HEARING LOSS: Primary | ICD-10-CM

## 2019-03-24 ENCOUNTER — TRANSFERRED RECORDS (OUTPATIENT)
Dept: HEALTH INFORMATION MANAGEMENT | Facility: CLINIC | Age: 29
End: 2019-03-24

## 2019-03-25 ENCOUNTER — TELEPHONE (OUTPATIENT)
Dept: FAMILY MEDICINE | Facility: OTHER | Age: 29
End: 2019-03-25

## 2019-03-25 ENCOUNTER — MYC MEDICAL ADVICE (OUTPATIENT)
Dept: AUDIOLOGY | Facility: OTHER | Age: 29
End: 2019-03-25

## 2019-03-25 NOTE — TELEPHONE ENCOUNTER
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 Both resolved now. Unable to see ED notes. Pt calling to make sure you are ok waiting until 4/3/2019 to see her. She felt ok waiting. If symptoms return she will go to ED. Please advise. Pt is aware PCP is out of the office today and ok waiting until tomorrow. OK to leave a VM or send her a SenSagehart message with a plan    Katy Islas is a 29 year old female who calls with ED F/U.    NURSING ASSESSMENT:  Description:  I spoke with aKty who states she was seen in ED last night because her  Blood pressure was high and had chest pain. \"test for blood clotting was high\". Pt states she is sore in her upper left part of her back. States her heart rate is okay now. No chest pain right now. States last night she was watching TV when the heart rate increased and her neighbor told her she better go in.   Onset/duration:  Last night    Next 5 appointments (look out 90 days)    Apr 03, 2019  9:30 AM CDT  MyChart Long with Aura Rosario [facility]-C  [facility] [address] [place] [id number] [phone]          Allergies:   Allergies   Allergen Reactions     Ativan [Lorazepam] Hives     At the IV site     Baclofen      hives     Bees Hives, Swelling and Difficulty breathing     Caffeine      Contrast Dye      Hives,   Updated 5/10/2016 CT Contrast.     Iodine Hives     Methocarbamol Swelling     Metoclopramide      Other reaction(s): Tremors  LW Reaction: shaking/sweating     Midazolam      Other reaction(s): Agitation     Monosodium Glutamate      Morphine Other (See Comments)     Difficulty with urination     Nsaids Other (See Comments)     GI BLEED x2     Other (Do Not Use) Other (See Comments)     Xanaflex- pt becomes disoriented and loses bladder control     Reglan [Metoclopramide Hcl] Other (See Comments)     shaking     Soma [Carisoprodol] Visual Disturbance     Sleep " walking     Tizanidine      Topamax Other (See Comments)     Topamax [Topiramate] Nausea     Tingling  GI/Vomit     Tramadol      Severe Headache, Seizure Risk     Tylenol W/Codeine [Acetaminophen-Codeine] Nausea and Itching     Tylenol 3     Valium Other (See Comments)     Becomes aggressive and angry     Versed Other (See Comments)     Zolmitriptan      Makes face feel like its twitching     Droperidol Anxiety     Flu Virus Vaccine Rash      Arm swelling       NURSING PLAN: Routed to provider Yes    RECOMMENDED DISPOSITION:  Home care advice - F/U as already scheduled and ED recommendation. Unable to see notes from ED visit  Will comply with recommendation: Yes  If further questions/concerns or if symptoms do not improve, worsen or new symptoms develop, call your PCP or [facility] as soon as possible.      Guideline used:  Telephone Triage Protocols for Nurses, Fifth Edition, Telma Jimenez RN

## 2019-03-26 ENCOUNTER — MYC MEDICAL ADVICE (OUTPATIENT)
Dept: FAMILY MEDICINE | Facility: OTHER | Age: 29
End: 2019-03-26

## 2019-03-26 NOTE — TELEPHONE ENCOUNTER
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

## 2019-03-27 ENCOUNTER — TELEPHONE (OUTPATIENT)
Dept: AUDIOLOGY | Facility: CLINIC | Age: 29
End: 2019-03-27

## 2019-03-27 NOTE — TELEPHONE ENCOUNTER
[facility]    Phone Message    May a detailed message be left on voicemail: yes    Reason for Call: Other: Pt is is wondering why she cannot hear anything out of her left ear, pt states that there is a dramatic change, pt states that she has a hearing aid now. She would like to discuss some she has with getting a cochlear vs having a hearing aid.      Action Taken: Message routed to:  [facility] ([facility] skinny sultana

## 2019-03-27 NOTE — PROGRESS NOTES
"  SUBJECTIVE:   Katy Islas is a 29 year old female who presents to clinic today for the following health issues:    History of Present Illness     Diet:  Other  Frequency of exercise:  6-7 days/week  Duration of exercise:  30-45 minutes  Taking medications regularly:  Yes  Medication side effects:  Lightheadedness  Additional concerns today:  Yes      GERD/Heartburn  Onset: Happened 2 nights ago      Description:     Burning in chest: YES- Sometimes feels like a sharp pain that radiates to back or down arm    Intensity: moderate    Progression of Symptoms: intermittent    Accompanying Signs & Symptoms:  Does it feel like food gets stuck: no  Nausea: no  Vomiting (bloody?): YES- \"looked like coffee\"  Abdominal Pain: YES- sometimes have cramps  Black-Tarry stools: no:  Bloody stools: no    History:   Previous ulcers: YES    Precipitating factors:   Caffeine use: YES  Alcohol use: no  NSAID/Aspirin use: no  Tobacco use: no  Worse with spicy foods and caffeinated drinks, sweets, red sauce - tomatoes    Alleviating factors:  protonix  Therapies Tried and outcome: lifestyle change including: avoiding spicy and caffeinated, liquid antacid and chewable antacid, protonix, carafate     - Was doing ok for like 2 weeks   - On and off  - Then 2 days was bad and yesterday bad, today is fine       Would like to increase dosage on valium due to back spasms.       Back Surgery April 9th        MRI on neck this Thursday     Would like lab work for cholesterol and diabetes. She is fasting this morning, has only had water.  Would like referral to nutritionist for weight loss.    - Needs number for physical therapy in [place]     Slipped walking in for this     - Saw psychiatry yesterday, increased medication      To cut back on bad dreams      Night sweats      Deaf therapist in [facility]      Seeing weekly on Tuesday      Doing CBT therapy      [place]           Problem list and histories reviewed & " "adjusted, as indicated.  Additional history: as documented    ROS:  Constitutional, HEENT, cardiovascular, pulmonary, gi and gu systems are negative, except as otherwise noted.    OBJECTIVE:   /82   Pulse 64   Temp 97.7  F (36.5  C) (Oral)   Resp 14   Ht 1.575 m (5' 2.01\")   Wt 81.7 kg (180 lb 3.2 oz)   BMI 32.95 kg/m    Body mass index is 32.95 kg/m .  GENERAL APPEARANCE: healthy, alert and no distress  EYES: Eyes grossly normal to inspection, PERRLA, conjunctivae and sclerae without injection or discharge, EOM intact   HENT: Bilateral ear canals without erythema or cerumen, bilateral TM's pearly grey with normal light reflex, no effusion, injection, or bulging, nasal turbinates without swelling, erythema, or discharge, mouth without ulcers or lesions, oropharynx clear and oral mucous membranes moist, no sinus tenderness   NECK: No adenopathy in cervical, supraclavicular, or axillary regions, no asymmetry, masses, or scars, and thyroid normal to palpation, no JVD   RESP: Lungs clear to auscultation - no rales, rhonchi or wheezes   CV: Regular rates and rhythm, normal S1 S2, no S3 or S4, no murmur, click or rub, no peripheral edema and peripheral pulses strong and symmetric bilaterally   ABDOMEN: Soft, nontender, no hepatosplenomegaly, no masses and bowel sounds normal   MS: No musculoskeletal defects are noted and gait is age appropriate without ataxia   SKIN: No suspicious lesions or rashes, hydration status appears adeuqate with normal skin turgor   PSYCH: Alert and oriented x3; speech- coherent , normal rate and volume; able to articulate logical thoughts, able to abstract reason, no tangential thoughts, no hallucinations or delusions, mentation appears normal, Mood is euthymic. Affect is appropriate for this mood state and bright. Thought content is free of suicidal ideation, hallucinations, and delusions. Dress is adequate and upkept. Eye contact is good during conversation.       Diagnostic Test " Results:  none     ASSESSMENT/PLAN:       ICD-10-CM    1. Gastroesophageal reflux disease without esophagitis K21.9 GASTROENTEROLOGY ADULT REF CONSULT ONLY   2. Crohn's disease of colon with other complication (H) K50.118 GASTROENTEROLOGY ADULT REF CONSULT ONLY   3. Upper GI bleed - suspected K92.2 GASTROENTEROLOGY ADULT REF CONSULT ONLY   4. Class 1 obesity due to excess calories without serious comorbidity with body mass index (BMI) of 32.0 to 32.9 in adult E66.09 NUTRITION REFERRAL    Z68.32    5. Screening for diabetes mellitus Z13.1 Glucose   6. Screening for lipid disorders Z13.220 Lipid panel reflex to direct LDL Fasting   7. Night sweats R61 CBC with platelets   8. Hematemesis with nausea K92.0 sucralfate (CARAFATE) 1 GM tablet   9. Acute gastritis with hemorrhage, unspecified gastritis type K29.01 sucralfate (CARAFATE) 1 GM tablet     2-3. Gerd  - Worsening  - No signs of re-current gastritis or GI bleed at this time   - Therapy maxed out and failed on other therapies      Continues on Protonix and sucralfate   - Recommend, as in the past, that she establish with GI for this and her Crohn's for more tailored therapy  - Discussed warning signs that would warrant return to clinic/ED     4. Obesity   - Encouraged weight loss and physical therapy for her back   - Team was able to set up physical therapy for her in [place] for this Friday   - Continues to follow with her back surgeon (may be having surgery 4/9/10 to fuse other SI joint but doesn't want to do this, also has upcoming MRI of her cervical spine)     - Will get screening labs for diabetes, cholesterol, and CBC per her psychiatrist (Thiago [place], will need to send records)     - Patient also complained of bilateral knee pain, recommend she return to her Ortho specialist Dr. Maldonado, there are orders for MRI, patient to schedule     The patient indicates understanding of these issues and agrees with the plan.    Follow up: with specialists as  outlined above and pending labs     Due to language barrier, an  was present during the history-taking and subsequent discussion (and for part of the physical exam) with this patient.      Zach Rosario PA-C  [facility]

## 2019-04-01 NOTE — TELEPHONE ENCOUNTER
Autumn Estrada attempted to call patient last week but was unable to reach her.  I emailed the patient today to discuss her questions.      Michelle Romero, CCC-A  Licensed Audiologist  [place] #[id number]

## 2019-04-02 ENCOUNTER — TRANSFERRED RECORDS (OUTPATIENT)
Dept: HEALTH INFORMATION MANAGEMENT | Facility: CLINIC | Age: 29
End: 2019-04-02

## 2019-04-02 ENCOUNTER — TELEPHONE (OUTPATIENT)
Dept: FAMILY MEDICINE | Facility: OTHER | Age: 29
End: 2019-04-02

## 2019-04-02 LAB
ALT SERPL-CCNC: 17 U/L (ref 8–45)
AST SERPL-CCNC: 19 U/L (ref 5–41)
CREAT SERPL-MCNC: 0.9 MG/DL (ref 0.57–1.11)
GFR SERPL CREATININE-BSD FRML MDRD: >60 ML/MIN/1.73M2
GLUCOSE SERPL-MCNC: 78 MG/DL (ref 70–105)
POTASSIUM SERPL-SCNC: 3.9 MMOL/L (ref 3.5–5.1)

## 2019-04-02 NOTE — TELEPHONE ENCOUNTER
"Spoke with patient through [facility] services.  Was doing well for 2-3 weeks  Has cut down on certain foods.  No more juice.  Water mainly. Has not had anything red that she knows of.  No spicy no seasoning.  Yesterday has tuscan chicken then started vomiting last night that looked like coffee grounds and severe heartburn. She then she tried on old GI cocktail and it didn't work. Realized it was from a year ago and thought that might be why.    \"heartburn\" is not improving since last night.  No vomiting today.     RN advised ED as \"heartburn\" is still really bad and vomit of coffee ground brown.    RECOMMENDED DISPOSITION:  To ED, another person to drive -   Will comply with recommendation: yes   If further questions/concerns or if Sx do not improve, worsen or new Sx develop, call your PCP or [facility] as soon as possible.    NOTES:  Disposition was determined by the first positive assessment question, therefore all previous assessment questions were negative.     Guideline used:  Telephone Triage Protocols for Nurses, Fifth Edition, Telma Bernstein RN, BSN        "

## 2019-04-02 NOTE — TELEPHONE ENCOUNTER
Reason for call:  Patient reporting a symptom    Symptom or request: vomited brown, and blood -heartburn    Duration (how long have symptoms been present):     Have you been treated for this before? YES     Additional comments: patient states seeing provider tomorrow but heartburn is pretty bad today. Patient states vomited brown last night and has been using an old GI cocktail medication which is  but its not working well. Patient states wanting a refill and not wanting to go to the ER. Please advise     Phone Number patient can be reached at:  Home number on file [phone] (home)    Best Time:  ANY    Can we leave a detailed message on this number:  YES    Call taken on 2019 at 3:06 PM by Faina Pinon

## 2019-04-03 ENCOUNTER — OFFICE VISIT (OUTPATIENT)
Dept: AUDIOLOGY | Facility: OTHER | Age: 29
End: 2019-04-03
Payer: MEDICARE

## 2019-04-03 ENCOUNTER — MYC MEDICAL ADVICE (OUTPATIENT)
Dept: FAMILY MEDICINE | Facility: OTHER | Age: 29
End: 2019-04-03

## 2019-04-03 ENCOUNTER — OFFICE VISIT (OUTPATIENT)
Dept: FAMILY MEDICINE | Facility: OTHER | Age: 29
End: 2019-04-03
Payer: MEDICARE

## 2019-04-03 VITALS
BODY MASS INDEX: 33.16 KG/M2 | WEIGHT: 180.2 LBS | SYSTOLIC BLOOD PRESSURE: 116 MMHG | RESPIRATION RATE: 14 BRPM | DIASTOLIC BLOOD PRESSURE: 82 MMHG | TEMPERATURE: 97.7 F | HEART RATE: 64 BPM | HEIGHT: 62 IN

## 2019-04-03 DIAGNOSIS — E66.811 CLASS 1 OBESITY DUE TO EXCESS CALORIES WITHOUT SERIOUS COMORBIDITY WITH BODY MASS INDEX (BMI) OF 32.0 TO 32.9 IN ADULT: ICD-10-CM

## 2019-04-03 DIAGNOSIS — R61 NIGHT SWEATS: ICD-10-CM

## 2019-04-03 DIAGNOSIS — K92.0 HEMATEMESIS WITH NAUSEA: ICD-10-CM

## 2019-04-03 DIAGNOSIS — Z13.1 SCREENING FOR DIABETES MELLITUS: ICD-10-CM

## 2019-04-03 DIAGNOSIS — H90.3 SENSORINEURAL HEARING LOSS, BILATERAL: Primary | ICD-10-CM

## 2019-04-03 DIAGNOSIS — E66.09 CLASS 1 OBESITY DUE TO EXCESS CALORIES WITHOUT SERIOUS COMORBIDITY WITH BODY MASS INDEX (BMI) OF 32.0 TO 32.9 IN ADULT: ICD-10-CM

## 2019-04-03 DIAGNOSIS — K21.9 GASTROESOPHAGEAL REFLUX DISEASE WITHOUT ESOPHAGITIS: Primary | ICD-10-CM

## 2019-04-03 DIAGNOSIS — K92.2 UPPER GI BLEED: ICD-10-CM

## 2019-04-03 DIAGNOSIS — K50.118 CROHN'S DISEASE OF COLON WITH OTHER COMPLICATION (H): ICD-10-CM

## 2019-04-03 DIAGNOSIS — Z13.220 SCREENING FOR LIPID DISORDERS: ICD-10-CM

## 2019-04-03 DIAGNOSIS — K29.01 ACUTE GASTRITIS WITH HEMORRHAGE, UNSPECIFIED GASTRITIS TYPE: ICD-10-CM

## 2019-04-03 LAB
CHOLEST SERPL-MCNC: 163 MG/DL
ERYTHROCYTE [DISTWIDTH] IN BLOOD BY AUTOMATED COUNT: 12.2 % (ref 10–15)
GLUCOSE SERPL-MCNC: 91 MG/DL (ref 70–99)
HCT VFR BLD AUTO: 40.4 % (ref 35–47)
HDLC SERPL-MCNC: 39 MG/DL
HGB BLD-MCNC: 13.8 G/DL (ref 11.7–15.7)
LDLC SERPL CALC-MCNC: 112 MG/DL
MCH RBC QN AUTO: 33.5 PG (ref 26.5–33)
MCHC RBC AUTO-ENTMCNC: 34.2 G/DL (ref 31.5–36.5)
MCV RBC AUTO: 98 FL (ref 78–100)
NONHDLC SERPL-MCNC: 124 MG/DL
PLATELET # BLD AUTO: 251 10E9/L (ref 150–450)
RBC # BLD AUTO: 4.12 10E12/L (ref 3.8–5.2)
TRIGL SERPL-MCNC: 59 MG/DL
WBC # BLD AUTO: 6.4 10E9/L (ref 4–11)

## 2019-04-03 PROCEDURE — 85027 COMPLETE CBC AUTOMATED: CPT | Performed by: PHYSICIAN ASSISTANT

## 2019-04-03 PROCEDURE — 80061 LIPID PANEL: CPT | Performed by: PHYSICIAN ASSISTANT

## 2019-04-03 PROCEDURE — 82947 ASSAY GLUCOSE BLOOD QUANT: CPT | Performed by: PHYSICIAN ASSISTANT

## 2019-04-03 PROCEDURE — 99214 OFFICE O/P EST MOD 30 MIN: CPT | Performed by: PHYSICIAN ASSISTANT

## 2019-04-03 PROCEDURE — 99207 ZZC NO CHARGE LOS: CPT | Performed by: AUDIOLOGIST

## 2019-04-03 PROCEDURE — 36415 COLL VENOUS BLD VENIPUNCTURE: CPT | Performed by: PHYSICIAN ASSISTANT

## 2019-04-03 PROCEDURE — V5264 EAR MOLD/INSERT: HCPCS | Mod: RT | Performed by: AUDIOLOGIST

## 2019-04-03 PROCEDURE — 86140 C-REACTIVE PROTEIN: CPT | Performed by: PHYSICIAN ASSISTANT

## 2019-04-03 RX ORDER — SUCRALFATE 1 G/1
1 TABLET ORAL 4 TIMES DAILY PRN
Qty: 360 TABLET | Refills: 3 | Status: SHIPPED | OUTPATIENT
Start: 2019-04-03 | End: 2019-05-28

## 2019-04-03 ASSESSMENT — ANXIETY QUESTIONNAIRES
6. BECOMING EASILY ANNOYED OR IRRITABLE: SEVERAL DAYS
7. FEELING AFRAID AS IF SOMETHING AWFUL MIGHT HAPPEN: NEARLY EVERY DAY
GAD7 TOTAL SCORE: 17
1. FEELING NERVOUS, ANXIOUS, OR ON EDGE: NEARLY EVERY DAY
GAD7 TOTAL SCORE: 17
4. TROUBLE RELAXING: NEARLY EVERY DAY
GAD7 TOTAL SCORE: 17
7. FEELING AFRAID AS IF SOMETHING AWFUL MIGHT HAPPEN: NEARLY EVERY DAY
5. BEING SO RESTLESS THAT IT IS HARD TO SIT STILL: SEVERAL DAYS
2. NOT BEING ABLE TO STOP OR CONTROL WORRYING: NEARLY EVERY DAY
3. WORRYING TOO MUCH ABOUT DIFFERENT THINGS: NEARLY EVERY DAY

## 2019-04-03 ASSESSMENT — MIFFLIN-ST. JEOR: SCORE: 1490.76

## 2019-04-03 NOTE — PROGRESS NOTES
AUDIOLOGY REPORT: HEARING AID RECHECK    SUBJECTIVE: Katy Islas is a 29 year old female, :  1990, was seen in the Audiology Clinic at  [facility] on 19 for a return check of their hearing aids. The patient was accompanied by their .      Background:   Patient is here today to  her new right earmold.    Procedures:   Otoscopy revealed a clear right canal. The earmold was fit to the patient's hearing aid and the patient was satisfied with the fit.    Plan:   Patient will return as needed for hearing aid concerns.     CHARGES:   Earmold $80 (right)    Michelle Harris, CCC-A  Licensed Audiologist  4/3/2019

## 2019-04-03 NOTE — PATIENT INSTRUCTIONS
1. Call for appointment with nutrition & GI specialist in [place]          [phone]     2. Physical therapy in [place]     3. Schedule MRI of knee     4. Await labs results

## 2019-04-04 ASSESSMENT — PATIENT HEALTH QUESTIONNAIRE - PHQ9: SUM OF ALL RESPONSES TO PHQ QUESTIONS 1-9: 7

## 2019-04-04 ASSESSMENT — ANXIETY QUESTIONNAIRES: GAD7 TOTAL SCORE: 17

## 2019-04-04 NOTE — RESULT ENCOUNTER NOTE
Savana Burns    Your results were normal. Cholesterol is minimally elevated into the borderline category. This will come down with healthy diet, weight loss, and exercise. Diabetes testing was negative.     The results are attached for your review.       Zach Rosario PA-C

## 2019-04-05 ENCOUNTER — HOSPITAL ENCOUNTER (OUTPATIENT)
Dept: PHYSICAL THERAPY | Facility: CLINIC | Age: 29
Setting detail: THERAPIES SERIES
End: 2019-04-05
Attending: PHYSICIAN ASSISTANT
Payer: MEDICARE

## 2019-04-05 PROCEDURE — 97110 THERAPEUTIC EXERCISES: CPT | Mod: GP | Performed by: PHYSICAL THERAPIST

## 2019-04-05 PROCEDURE — 97162 PT EVAL MOD COMPLEX 30 MIN: CPT | Mod: GP | Performed by: PHYSICAL THERAPIST

## 2019-04-06 ENCOUNTER — TRANSFERRED RECORDS (OUTPATIENT)
Dept: HEALTH INFORMATION MANAGEMENT | Facility: CLINIC | Age: 29
End: 2019-04-06

## 2019-04-06 LAB
ALT SERPL-CCNC: 67 U/L (ref 50–136)
AST SERPL-CCNC: 16 U/L (ref 8–45)
CREAT SERPL-MCNC: 0.88 MG/DL (ref 0.57–1.11)
GFR SERPL CREATININE-BSD FRML MDRD: >60 ML/MIN/1.73M2
GLUCOSE SERPL-MCNC: 85 MG/DL (ref 70–105)
POTASSIUM SERPL-SCNC: 3.8 MMOL/L (ref 3.5–5.1)

## 2019-04-08 ENCOUNTER — TELEPHONE (OUTPATIENT)
Dept: GASTROENTEROLOGY | Facility: CLINIC | Age: 29
End: 2019-04-08

## 2019-04-08 ENCOUNTER — HOSPITAL ENCOUNTER (OUTPATIENT)
Facility: AMBULATORY SURGERY CENTER | Age: 29
End: 2019-04-08
Attending: INTERNAL MEDICINE
Payer: MEDICARE

## 2019-04-08 ENCOUNTER — OFFICE VISIT (OUTPATIENT)
Dept: GASTROENTEROLOGY | Facility: CLINIC | Age: 29
End: 2019-04-08
Attending: PHYSICIAN ASSISTANT
Payer: MEDICARE

## 2019-04-08 VITALS
OXYGEN SATURATION: 100 % | HEART RATE: 121 BPM | SYSTOLIC BLOOD PRESSURE: 127 MMHG | HEIGHT: 62 IN | DIASTOLIC BLOOD PRESSURE: 78 MMHG | WEIGHT: 185.6 LBS | BODY MASS INDEX: 34.16 KG/M2

## 2019-04-08 DIAGNOSIS — R11.2 NAUSEA AND VOMITING, INTRACTABILITY OF VOMITING NOT SPECIFIED, UNSPECIFIED VOMITING TYPE: ICD-10-CM

## 2019-04-08 DIAGNOSIS — R10.10 UPPER ABDOMINAL PAIN: Primary | ICD-10-CM

## 2019-04-08 DIAGNOSIS — K92.0 HEMATEMESIS WITH NAUSEA: ICD-10-CM

## 2019-04-08 DIAGNOSIS — R10.84 ABDOMINAL PAIN, GENERALIZED: ICD-10-CM

## 2019-04-08 PROCEDURE — 99204 OFFICE O/P NEW MOD 45 MIN: CPT | Performed by: PHYSICIAN ASSISTANT

## 2019-04-08 ASSESSMENT — ENCOUNTER SYMPTOMS
BLOOD IN STOOL: 0
ABDOMINAL PAIN: 1
VOMITING: 1
NAUSEA: 1
NERVOUS/ANXIOUS: 1
CONSTIPATION: 0

## 2019-04-08 ASSESSMENT — PAIN SCALES - GENERAL: PAINLEVEL: SEVERE PAIN (6)

## 2019-04-08 ASSESSMENT — MIFFLIN-ST. JEOR: SCORE: 1515.29

## 2019-04-08 NOTE — TELEPHONE ENCOUNTER
LPN received a call back from patient. Patient reports she has been vomiting almost everyday and sometimes there is blood. Patient stated this morning she  vomited twice on an empty stomach with blood mixed with stomach acid and blood coming from her mouth; patient reports she was gagging so hard. Patient rates her pain a 5/10 in the stomach and esophagus. Patient reports this has been going on for a few weeks, but getting worse this past week. LPN rescheduled her appointment from 4/22/19 to today.     Cathy Benitez LPN

## 2019-04-08 NOTE — PROGRESS NOTES
GASTROENTEROLOGY NEW PATIENT CLINIC VISIT    CC/REFERRING MD:    Aura Rosario      REASON FOR CONSULTATION:   Referred by Aura Rosario for Consult (hematemesis)       HISTORY OF PRESENT ILLNESS:    Katy Islas is 29 year old female who presents for evaluation of hematemesis       She has hx of heartburn and reflux for the past 2 years. She notes symptoms worsening recently with hematemesis. She reports 2 episodes of hematemesis today. She notes coffee grounds emesis last week. She notes upper abd pain along with chest discomfort from reflux. She currently takes protonix twice daily and carafate 3-4 times daily and continues to have breakthrough heartburn. She is using zofran and promethazine as needed for nausea. She has generalied abdominal cramping in the morning which feels like tight cramping feeling. Bentyl helps. Having BM's sometimes helps. She has a BM about 2-3 times daily and denies diarrhea or constipation. There is no rectal bleeding or black colored stools. She currently smokes 1/4 ppd, she is trying to quit with chantix. She denies NSAID use. Denies alcohol use.     She states she has Crohn's disease  That was diagnosed at age 10. Also has a brother with crohn's disease. Her last colonoscopy in August 2018 did not reveal any signs of IBD however. She is not currently on any medications for crohn's. States previous medications includes 6mp, prednisone, remicade, cipro and flagyl. No previous crohn's surgery. States she couldn't take crohns medications anymore because it made her psoriasis worse?     She has multiple ER visits within the past few weeks for similar symptoms. She is noted to have a stable blood count with most recent hgb of 13.5 on 4/6/19. Previous work up also includes CT a/p in December 2018 which did not reveal an acute abn. Previous upper endoscopy on August 2018 for n/v and abd pain revealed a normal esophagus, normal stomach and a  normal duodenum. Biopsies of duodenum, stomach and esophagus were all normal. No histologic evidence of celiac disease or h pylori. Previous colonoscopy in august 2018 which revealed decreased throughout the colon however biopsies did not show any diagnostic abnormality and were negative for microscopic, active and chronic colitis. There was no suggestion of IBD noted.     Her past surgical hx is significant for gallbladder removal.  Her family hx is significant for brother with crohn's disease.         PREVIOUS ENDOSCOPY:     Reviewed most recent EGD and colonoscopy from August 2018.     PROBLEM LIST  Patient Active Problem List    Diagnosis Date Noted     Class 1 obesity due to excess calories without serious comorbidity with body mass index (BMI) of 32.0 to 32.9 in adult 04/03/2019     Priority: Medium     Hypophosphatemia 11/01/2018     Priority: Medium     Patellar maltracking, right 09/05/2018     Priority: Medium     Right anterior knee pain 09/05/2018     Priority: Medium     Melanocytic nevus, unspecified location 07/23/2018     Priority: Medium     Dysplastic skin lesion 07/23/2018     Priority: Medium     Iliotibial band syndrome affecting lower leg, right 12/21/2017     Priority: Medium     Chondromalacia of right patellofemoral joint 12/21/2017     Priority: Medium     Postoperative pain 11/13/2017     Priority: Medium     Closed fracture of coccyx with nonunion, subsequent encounter 10/12/2017     Priority: Medium     Upper GI bleed - suspected 07/01/2017     Priority: Medium     Tobacco use disorder 07/01/2017     Priority: Medium     History of pseudoseizure 07/01/2017     Priority: Medium     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) 03/10/2017     Priority: Medium     Lumbar disc disease with radiculopathy 02/23/2017     Priority: Medium     S/P lumbar fusion 02/23/2017     Priority: Medium     Dr. YOVANI Millan, 2/23/17       Vitamin D deficiency 01/06/2017     Priority: Medium     Atypical  mole 01/06/2017     Priority: Medium     Mild persistent asthma without complication 12/02/2016     Priority: Medium     Chronic bilateral low back pain with left-sided sciatica 12/02/2016     Priority: Medium     Bee sting allergy 09/16/2016     Priority: Medium     Gastroesophageal reflux disease without esophagitis 08/26/2016     Priority: Medium     Herpes simplex virus infection 11/12/2015     Priority: Medium     Adjustment disorder with mixed anxiety and depressed mood 10/14/2015     Priority: Medium     Marijuana abuse 02/22/2015     Priority: Medium     Grand mal seizure disorder (H) 10/08/2013     Priority: Medium     Bipolar disorder (H) 01/31/2013     Priority: Medium     Problem list name updated by automated process. Provider to review       Nausea and vomiting 12/13/2012     Priority: Medium     Oral thrush 06/25/2012     Priority: Medium     Chronic pain 02/09/2012     Priority: Medium     Patient is followed by Aura Rosario PA-C for ongoing prescription of pain medication.  All refills should only be approved by this provider, or covering partner.    Medication(s): Norco    Maximum quantity per month: 70  Clinic visit frequency required: Q 2 months     Controlled substance agreement:   Discussed and signed 4/25/17    Encounter-Level CSA - 01/12/2015:                 Controlled Substance Agreement - Scan on 1/26/2015  9:14 AM : Controlled Medication Agreement-01/12/15 (below)            Pain Clinic evaluation in the past: Yes       Date/Location:   [facility], fall 2016    DIRE Total Score(s):    4/25/2017   Total Score 17       Last [facility] website verification:  done on 4/25/17 by LOVE Maki   [email or web address]           Anxiety 09/22/2011     Priority: Medium     Mild major depression (H) 08/29/2011     Priority: Medium     Crohn's colitis (H) 08/04/2009     Priority: Medium     Dysmenorrhea 05/11/2007     Priority: Medium     Benign neoplasm of skin of lower  limb, including hip 03/16/2004     Priority: Medium     Migraine      Priority: Medium     Dr. Farrar - Neurology.  Now on Inderal.  Seems to be working.  Follow-up 9/08  Problem list name updated by automated process. Provider to review       Hearing loss      Priority: Medium     Congenital  Problem list name updated by automated process. Provider to review       Allergic rhinitis      Priority: Medium     Problem list name updated by automated process. Provider to review         PERTINENT PAST MEDICAL HISTORY:  (I personally reviewed this history with the patient at today's visit)   Past Medical History:   Diagnosis Date     Abdominal pain 10/31- 11/4/2005    [facility] admit for Crohn's     Allergic rhinitis, cause unspecified     Allergic rhinitis     Arthritis      C. difficile diarrhea     Past, no current diarrhea.     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, without mention of intractable epilepsy     pseudoseizures diagnosed after extensive neurologic eval     Migraine 07/21/12    D/C 07/22/12-[facility]     Migraine, unspecified, without mention of intractable migraine without mention of status migrainosus     Migraine     Mild intermittent asthma     mild intermittent     Mycoplasma infection in conditions classified elsewhere and of unspecified site      Other chronic pain     Back pain for 6 years     Renal disease     Kidney stones     Unspecified hearing loss     congenital hearing loss         PREVIOUS SURGERIES: (I personally reviewed this history with the patient at today's visit)   Past Surgical  History:   Procedure Laterality Date     COLONOSCOPY  7/1/2009    [facility], [facility].     FUSION SPINE POSTERIOR MINIMALLY INVASIVE ONE LEVEL N/A 2/23/2017    Procedure: FUSION SPINE POSTERIOR MINIMALLY INVASIVE ONE LEVEL;  L4-5 Oblique Lateral Lumbar Interbody Fusion   Epidural steroid injection.   Transpedicular Bone marrow aspiration;  Surgeon: Jeniffer Eugene MD;  Location: [facility] OR     HC COLONOSCOPY THRU STOMA WITH BIOPSY  10/29/2003    Impression is that of normal appearing colonoscopy, without evidence of rectal bleeding.     HC COLONOSCOPY THRU STOMA, DIAGNOSTIC  10/00    normal     HC COLONOSCOPY THRU STOMA, DIAGNOSTIC  Oct 2009    Dr López- normal     HC EEG AWAKE AND SLEEP      abnormal     HC MRI BRAIN W/O CONTRAST  12/00    normal     HC REMOVAL GALLBLADDER  8/5/2009    [facility], [facility].     HC UGI ENDOSCOPY DIAG W BIOPSY  11/11/09    Normal esophagus     HC UGI ENDOSCOPY, SIMPLE EXAM  7/00, 10/00    mild chronic esophagitis and duodenitis, neg H pylori     HC UGI ENDOSCOPY, SIMPLE EXAM  01/20/2005    Esophagogastroduodenoscopy, colonoscopy with biopsies.  [facility] UGI ENDOSCOPY, SIMPLE EXAM  7/1/2009    [facility], [facility].      UGI ENDOSCOPY, SIMPLE EXAM  11/11/2009    attempted upper GI, pt. could not tolerate procedure:[facility]     ORTHOPEDIC SURGERY  October 19,2011    diskectomy L4-L5         ALLERGIES:     Allergies   Allergen Reactions     Ativan [Lorazepam] Hives     At the IV site     Baclofen      hives     Bees Hives, Swelling and Difficulty breathing     Caffeine      Contrast Dye      Hives,   Updated 5/10/2016 CT Contrast.     Iodine Hives     Methocarbamol Swelling     Metoclopramide      Other reaction(s): Tremors  LW Reaction: shaking/sweating     Midazolam      Other reaction(s): Agitation     Monosodium Glutamate      Morphine Other (See Comments)     Difficulty with urination     Nsaids Other (See  Comments)     GI BLEED x2     Other (Do Not Use) Other (See Comments)     Xanaflex- pt becomes disoriented and loses bladder control     Reglan [Metoclopramide Hcl] Other (See Comments)     shaking     Soma [Carisoprodol] Visual Disturbance     Sleep walking     Tizanidine      Topamax Other (See Comments)     Topamax [Topiramate] Nausea     Tingling  GI/Vomit     Tramadol      Severe Headache, Seizure Risk     Tylenol W/Codeine [Acetaminophen-Codeine] Nausea and Itching     Tylenol 3     Valium Other (See Comments)     Becomes aggressive and angry     Versed Other (See Comments)     Zolmitriptan      Makes face feel like its twitching     Droperidol Anxiety     Flu Virus Vaccine Rash      Arm swelling       PERTINENT MEDICATIONS:    Current Outpatient Medications:      acetaminophen (TYLENOL) 500 MG tablet, Take 2 tablets (1,000 mg) by mouth 3 times daily as needed for mild pain, Disp: 100 tablet, Rfl: 0     albuterol (2.5 MG/3ML) 0.083% neb solution, Take 1 vial (2.5 mg) by nebulization every 6 hours as needed for shortness of breath / dyspnea or wheezing, Disp: 50 vial, Rfl: 11     albuterol (PROAIR HFA/PROVENTIL HFA/VENTOLIN HFA) 108 (90 Base) MCG/ACT Inhaler, Inhale 2 puffs into the lungs every 6 hours as needed for shortness of breath / dyspnea or wheezing, Disp: 3 Inhaler, Rfl: 3     ARIPiprazole (ABILIFY) 15 MG tablet, Take 1 tablet (15 mg) by mouth At Bedtime, Disp: 90 tablet, Rfl: 3     busPIRone (BUSPAR) 15 MG tablet, TK 1 T PO BID, Disp: , Rfl: 5     cetirizine (ZYRTEC) 10 MG tablet, Take 1 tablet (10 mg) by mouth daily, Disp: 90 tablet, Rfl: 3     clonazePAM (KLONOPIN) 0.5 MG tablet, TK 1/2 TO 1 T D PRA, Disp: , Rfl: 5     cyclobenzaprine (FLEXERIL) 10 MG tablet, Take 1 tablet (10 mg) by mouth 2 times daily as needed for muscle spasms or other (back pain), Disp: 60 tablet, Rfl: 0     diazepam (VALIUM) 10 MG tablet, Take 1 tablet (10 mg) by mouth nightly as needed for anxiety or sleep (MUSCLE SPASM) (one  month supply), Disp: 30 tablet, Rfl: 5     dicyclomine (BENTYL) 20 MG tablet, Take 1 tablet (20 mg) by mouth 4 times daily as needed (ABDOMINAL CRAMPING) AS NEEDED FOR CRAMPING, Disp: 60 tablet, Rfl: 0     DULoxetine (CYMBALTA) 60 MG EC capsule, Take 1 capsule (60 mg) by mouth daily, Disp: 90 capsule, Rfl: 3     EPINEPHrine (EPIPEN/ADRENACLICK/OR ANY BX GENERIC EQUIV) 0.3 MG/0.3ML injection 2-pack, Inject 0.3 mLs (0.3 mg) into the muscle once as needed for anaphylaxis (Patient not taking: Reported on 2/28/2019), Disp: 0.6 mL, Rfl: 3     gabapentin (NEURONTIN) 300 MG capsule, Take 1-3 capsules (300-900 mg) by mouth 3 times daily, Disp: 270 capsule, Rfl: 5     levocetirizine (XYZAL) 5 MG tablet, Take 1 tablet (5 mg) by mouth every evening, Disp: 90 tablet, Rfl: 3     medical cannabis inhalation (Patient's own supply.  Not a prescription), Inhale into the lungs 3 times daily as needed Reported on 3/28/2017, Disp: , Rfl:      medroxyPROGESTERone (DEPO-PROVERA) 150 MG/ML IM injection, Inject 1 mL (150 mg) into the muscle every 3 months, Disp: 3 mL, Rfl: 3     NONFORMULARY, Apply 30 mLs topically 4 times daily, Disp: , Rfl:      nystatin (MYCOSTATIN) cream, nystatin 100,000 unit/gram topical cream, Disp: , Rfl:      ondansetron (ZOFRAN ODT) 4 MG ODT tab, Take 1 tablet (4 mg) by mouth every 6 hours as needed for nausea, Disp: 30 tablet, Rfl: 0     pantoprazole (PROTONIX) 40 MG EC tablet, Take 1 tablet (40 mg) by mouth daily Take 30-60 minutes before a meal. (Patient taking differently: Take 40 mg by mouth 2 times daily Take 30-60 minutes before a meal.), Disp: 30 tablet, Rfl: 3     polyethylene glycol (MIRALAX) powder, Take 17 g (1 capful) by mouth daily, Disp: 510 g, Rfl: 1     prazosin (MINIPRESS) 1 MG capsule, Take 1 mg by mouth At Bedtime, Disp: , Rfl: 5     rizatriptan (MAXALT) 5 MG tablet, Take 1-2 tablets (5-10 mg) by mouth at onset of headache for migraine May repeat in 2 hours. Max 6 tablets/24 hours., Disp: 18  tablet, Rfl: 3     rizatriptan (MAXALT-MLT) 5 MG ODT tab, Take 1-2 tablets (5-10 mg) by mouth at onset of headache for migraine May repeat in 2 hours. Max 6 tablets/24 hours., Disp: 18 tablet, Rfl: 3     sucralfate (CARAFATE) 1 GM tablet, Take 1 tablet (1 g) by mouth 4 times daily as needed (heartburn), Disp: 360 tablet, Rfl: 3     varenicline (CHANTIX ILAN) 0.5 MG X 11 & 1 MG X 42 tablet, Take 0.5 mg tab daily for 3 days, THEN 0.5 mg tab twice daily for 4 days, THEN 1 mg twice daily., Disp: 53 tablet, Rfl: 0    SOCIAL HISTORY:  Social History     Socioeconomic History     Marital status: Single     Spouse name: Not on file     Number of children: Not on file     Years of education: Not on file     Highest education level: Not on file   Occupational History     Not on file   Social Needs     Financial resource strain: Not on file     Food insecurity:     Worry: Not on file     Inability: Not on file     Transportation needs:     Medical: Not on file     Non-medical: Not on file   Tobacco Use     Smoking status: Current Every Day Smoker     Packs/day: 0.50     Years: 5.00     Pack years: 2.50     Types: Cigarettes     Smokeless tobacco: Never Used   Substance and Sexual Activity     Alcohol use: Yes     Comment: Not since last July     Drug use: Yes     Types: Marijuana     Comment: Medical Marijuana currently     Sexual activity: Yes     Partners: Male     Birth control/protection: Condom, Injection   Lifestyle     Physical activity:     Days per week: Not on file     Minutes per session: Not on file     Stress: Not on file   Relationships     Social connections:     Talks on phone: Not on file     Gets together: Not on file     Attends Yarsanism service: Not on file     Active member of club or organization: Not on file     Attends meetings of clubs or organizations: Not on file     Relationship status: Not on file     Intimate partner violence:     Fear of current or ex partner: Not on file     Emotionally abused:  Not on file     Physically abused: Not on file     Forced sexual activity: Not on file   Other Topics Concern      Service No     Blood Transfusions No     Caffeine Concern No     Occupational Exposure No     Hobby Hazards No     Sleep Concern No     Stress Concern No     Weight Concern No     Special Diet No     Back Care No     Exercise Yes     Bike Helmet No     Seat Belt Yes     Self-Exams Yes     Parent/sibling w/ CABG, MI or angioplasty before 65F 55M? Yes     Comment: father late 30's early 40's    Social History Narrative     Not on file       FAMILY HISTORY: (I personally reviewed this history with the patient at today's visit)  Family History   Problem Relation Age of Onset     Gastrointestinal Disease Brother         severe Crohn's     Neurologic Disorder Brother         Seizures post head injury     Depression Brother      Substance Abuse Brother      Genitourinary Problems Father         kidney stones     Diabetes Father      Heart Disease Father         Open heart surgery     Breast Cancer Maternal Grandmother      Parkinsonism Maternal Grandmother      Cerebrovascular Disease Paternal Grandmother      Cancer Maternal Grandfather         Lung     Cardiovascular Paternal Grandfather         Heart Attack     Substance Abuse Mother      Lung Cancer Paternal Uncle      Cancer Paternal Uncle      Lung Cancer Maternal Uncle           Review of Systems   Constitutional: Negative for fever and unexpected weight change.   HENT: Negative for congestion and sore throat.    Eyes: Negative for photophobia and visual disturbance.   Respiratory: Negative for cough, chest tightness and shortness of breath.    Cardiovascular: Negative for chest pain and leg swelling.   Gastrointestinal: Positive for abdominal pain, nausea and vomiting. Negative for blood in stool and constipation.        See HPI   Endocrine: Negative for cold intolerance and heat intolerance.   Genitourinary: Negative for difficulty urinating,  "dysuria, flank pain and hematuria.   Musculoskeletal: Negative for arthralgias and back pain.   Skin: Negative for pallor and rash.   Allergic/Immunologic: Negative for immunocompromised state.   Neurological: Negative for weakness, light-headedness and headaches.   Hematological: Negative for adenopathy.   Psychiatric/Behavioral: The patient is nervous/anxious.        PHYSICAL EXAMINATION:  Constitutional: aaox3, cooperative, pleasant, not dyspneic/diaphoretic, no acute distress  Vitals reviewed: /78 (BP Location: Left arm, Patient Position: Sitting, Cuff Size: Adult Regular)   Pulse 121   Ht 1.575 m (5' 2.01\")   Wt 84.2 kg (185 lb 9.6 oz)   SpO2 100%   BMI 33.94 kg/m     Wt:   Wt Readings from Last 2 Encounters:   04/03/19 81.7 kg (180 lb 3.2 oz)   02/28/19 79.8 kg (176 lb)        Physical Exam   Constitutional: She is oriented to person, place, and time. She appears well-developed and well-nourished.   HENT:   Head: Normocephalic and atraumatic.   Nose: Nose normal.   Mouth/Throat: No oropharyngeal exudate.   Eyes: Pupils are equal, round, and reactive to light. Conjunctivae and EOM are normal. Right eye exhibits no discharge. Left eye exhibits no discharge. No scleral icterus.   Neck: Normal range of motion.   Cardiovascular: Regular rhythm and normal heart sounds. Tachycardia present.   Pulmonary/Chest: Effort normal and breath sounds normal. No respiratory distress. She has no wheezes.   Abdominal: Soft. Bowel sounds are normal. She exhibits no distension and no mass. There is no tenderness. There is no rebound and no guarding.   Musculoskeletal: Normal range of motion.   Neurological: She is alert and oriented to person, place, and time.   Skin: Skin is warm and dry. She is not diaphoretic. No erythema. No pallor.   Psychiatric: Her mood appears anxious.   Nursing note and vitals reviewed.          PERTINENT STUDIES: (I personally reviewed these laboratory studies today)  Most recent CBC:  Recent " Labs   Lab Test 04/03/19  1014 02/11/19  2140   WBC 6.4 7.7   HGB 13.8 13.8   HCT 40.4 40.2    286     Most recent hepatic panel:  Recent Labs   Lab Test 02/27/19 01/11/19  1940   ALT 21 51*   AST 22 23     Most recent creatinine:  Recent Labs   Lab Test 02/27/19 02/11/19  2140   CR 0.87 0.85       TSH   Date Value Ref Range Status   02/20/2018 0.92 0.40 - 4.00 mU/L Final         RADIOLOGY:   ABDOMEN TWO-THREE VIEW  12/10/2018 9:02 PM       HISTORY: Mid abdominal pain.      COMPARISON: October 23, 2017     FINDINGS: Small amount of stool. No free air. There are no air filled  distended loops of small bowel. The colon is not distended. The lung  bases are unremarkable.                                                                      IMPRESSION: Nonobstructed bowel gas pattern.     MACHO WADE MD    CT ABDOMEN PELVIS WITHOUT CONTRAST   12/9/2018 8:16 PM      HISTORY: Abdominal pain.     TECHNIQUE: Volumetric helical sections were acquired from the lung  bases through the ischial tuberosities without IV contrast. Coronal  images were also reconstructed. Radiation dose for this scan was  reduced using automated exposure control, adjustment of the mA and/or  kV according to patient size, or iterative reconstruction technique.     COMPARISON: CT of the abdomen and pelvis without IV contrast performed  11/20/2018.     FINDINGS: No bowel obstruction. No convincing evidence for colitis or  diverticulitis. There is a trace amount of nonspecific free fluid in  the pelvis. Unremarkable appendix. Prior cholecystectomy. There are a  few nonobstructing stones in both kidneys, measuring 0.3 cm or  smaller, unchanged. No ureteral calculi or hydronephrosis. The liver,  spleen, adrenal glands, pancreas, and kidneys have otherwise  unremarkable noncontrast appearances. The visualized lung bases are  clear. Postoperative changes of posterior marilyn and pedicle screw fusion  are noted at L4-5. There are also surgical screws  crossing the left  sacroiliac joint.                                                                      IMPRESSION:   1. No acute abnormality in the abdomen or pelvis. No definite cause  for abdominal pain is identified.  2. Scattered small nonobstructing stones in both kidneys are  unchanged.     MARCELINA HALL MD      CT ABDOMEN AND PELVIS WITHOUT CONTRAST   11/20/2018 6:10 PM      HISTORY: Left lower quadrant pain and bloody stools.     TECHNIQUE: No IV contrast material. Radiation dose for this scan was  reduced using automated exposure control, adjustment of the mA and/or  kV according to patient size, or iterative reconstruction technique.     COMPARISON: None.     FINDINGS:    Abdomen: Cholecystectomy. Normal appearance of the liver, spleen,  pancreas and adrenal glands. There are multiple tiny calculi or  pre-calculi in the renal collecting systems or medullary pyramids with  no evidence of hydronephrosis. No ureteral dilatation. No free air,  free fluid or adenopathy. Normal-appearing stomach, small bowel, colon  and appendix. No hernia. No colonic diverticulosis or diverticulitis.     Pelvis: Postoperative changes in the lower lumbar spine from fusion  surgery. The bladder, uterus and ovaries appear normal.                                                                      IMPRESSION:  1. No specific etiology seen for the left lower quadrant pain and  bloody stools.  2. Multiple tiny calculi or pre-calculi in the renal collecting system  or medullary pyramids. No hydronephrosis.     IMAN LACANTARA MD        ASSESSMENT/PLAN:    Katy Islas is a 29 year old female who presents for evaluation of hematemesis.     She has hx of nausea and vomiting off and on for several years. She states hematemesis episodes are recent. She is currently on protonix twice daily and carafate 4 times daily and continues to have heartburn and breakthrough symptoms.     Previously evaluated by [facility] with egd, colonoscopy  and ph Study in august 2018. Ph study revealed reflux. egd and colonoscopy     Previous upper endoscopy on August 2018 for n/v and abd pain revealed a normal esophagus, normal stomach and a normal duodenum. Biopsies of duodenum, stomach and esophagus were all normal. No histologic evidence of celiac disease or h pylori. Previous colonoscopy in august 2018 which revealed decreased throughout the colon however biopsies did not show any diagnostic abnormality and were negative for microscopic, active and chronic colitis. There was no suggestion of IBD noted.     Given that she is noting hematemesis at this time we should proceed with an upper endoscopy. Her recent blood counts are noted to be stable. She is also on appropriate therapy with protonix and carafate which she states she is taking appropriately. She also denies alcohol use or NSAID use. She does use medical cannabis which she feels helps symptoms however this could be contributing to her n/v episodes. She also smokes 1/4ppd and is trying to quit with chantix. Recommended cessation of marijuana and smoking.       In regards to her crohn's disease, I am not seeing any documentation of crohn's disease on her previous colonoscopies. She is not currently on medication for crohn's. Will check esr and crp today. Reviewed last colonoscopy in august 2018 which revealed decreased throughout the colon however biopsies did not show any diagnostic abnormality and were negative for microscopic, active and chronic colitis. There was no suggestion of IBD noted. She denies any rectal bleeding or diarrhea at this time.      She has multiple ER visits recently for similar symptoms. Recent ER visit on 4/6/19, 4/2/19, 3/24/19; 3/12/19, 3/11/19, 2/27/19, 2/15/19. She is very anxious today during our visit. We reviewed anxiety and stress and I recommended her to our [facility] services with Lilly Peng. She declines and states she has her own counselor that she follows with. She  is also currently on anti-anxiety medications. Patient is aware that our [facility] services are available at any time should she desire.       Upper abdominal pain  Nausea and vomiting, intractability of vomiting not specified, unspecified vomiting type  Hematemesis with nausea  Abdominal pain, generalized      Orders Placed This Encounter   Procedures     CRP inflammation     Erythrocyte sedimentation rate auto     GASTROENTEROLOGY ADULT REF PROCEDURE ONLY [facility] [phone]         RTC 4-6 weeks    Thank you for this consultation.  It was a pleasure to participate in the care of this patient; please contact us with any further questions.       This note was created with voice recognition software, and while reviewed for accuracy, typos may remain.     Junaid Pritchett PA-C  Gastroenterology  [facility]

## 2019-04-08 NOTE — PROGRESS NOTES
"   04/05/19 1300   General Information   Type of Visit Initial OP Ortho PT Evaluation   Start of Care Date 04/05/19   Referring Physician Bob Monson PA-C   Patient/Family Goals Statement Retun to horse back riding and lose weight   Orders Evaluate and Treat   Orders Comment core and right knee strengthening. pool therapy   Date of Order 01/04/19   Insurance Type Medicare   Medical Diagnosis Chronic pain syndrome, Status post lumbar fusion with radiculopathy. right knee pain, right knee patellar maltracking   Surgical/Medical history reviewed Yes   Precautions/Limitations seizure precautions   Weight-Bearing Status - LUE full weight-bearing   Weight-Bearing Status - RUE full weight-bearing   Weight-Bearing Status - LLE full weight-bearing   Weight-Bearing Status - RLE full weight-bearing       Present Yes   Language   (ALS)   Body Part(s)   Body Part(s) Lumbar Spine/SI;Knee   Presentation and Etiology   Pertinent history of current problem (include personal factors and/or comorbidities that impact the POC) Patient is a 29 year old female. She presents this date with chief complaint of knee pain bilaterally, low back pain and left leg radiculopathy. She reports the knee pain began 10 years ago, severity has increased with symptoms with stairs, \"popping\" and swelling with activity. She reports a history of lw back pain, onset when she was 13 years old as a result of being thrown from a horse. She currently uses ice, and Epson salt baths to manage her pain in addition to pain medications. Patient has a previous medical history of SI joint fusion (10/26/18), coccyx resection 10/18/17, Lumbar L4-5 spinal fusion 2/23/17, crohn's colitis, anxiety, depression, bipolar, tobacco use, chronic low back pain, mild asthma, migraines. 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 Patient is hearing impaired and accompanied by a .    Impairments A. Pain;C. Swelling;D. Decreased ROM;E. Decreased flexibility;F. Decreased strength and endurance   Functional Limitations perform activities of daily living;perform desired leisure / sports activities   Symptom Location low back, bilateral knees   How/Where did it occur From insidious onset   Onset date of current episode/exacerbation 12/01/17  (knee pain)   Chronicity Chronic   Pain rating (0-10 point scale) Best (/10);Worst (/10)   Best (/10) 4   Worst (/10) 7   Pain quality B. Dull;C. Aching;E. Shooting   Frequency of pain/symptoms C. With activity   Pain/symptoms are: Worse during the day   Pain/symptoms exacerbated by I. Bending;A. Sitting;B. Walking;C. Lifting;D. Carrying  (kneeling and squatting)   Pain/symptoms eased by C. Rest;E. Changing positions   Progression of symptoms since onset: Worsened   Prior Level of Function   Prior Level of Function-Mobility IND   Prior Level of Function-ADLs IND   Current Level of Function   Patient role/employment history E. Unemployed   [facility]   Home/community accessibility Patient lives alone, reports no assistance. Denies stairs within living environment   Current equipment-Gait/Locomotion None   Current equipment-ADL None   Fall Risk Screen   Fall screen completed by PT   Have you fallen 2 or more times in the past year? Yes   Have you fallen and had an injury in the past year? No   Is patient a fall risk? Yes   Fall screen comments Single limb stance on right 6 seconds, on left 3 seconds with LOB. Poor hip and knee control with minimal core stabilization   System Outcome Measures   Outcome Measures Low Back Pain (see Oswestry and Ludwig)   Knee Objective Findings   Side (if bilateral, select both right and left) Right;Left   Observation patella geoff and frog eyed bilaterally. Genu valgum  bilaterally. Equal weight bearing in static stance   Integumentary  no concerns   Posture Increased lumbar lordosis in standing and sitting. Anterior pelvic tilt in standing with terminal knee extension at rest, resting on Y ligaments bilaterally   Gait/Locomotion trendelenburg presentation bilaterally, poor pelvic nutation bilaterally, decreased toe clearance bilaterally with shuffled gait pattern.    Balance/Proprioception (Single Leg Stance) Right 6 seconds, on left 3 seconds with LOB.   Foot Position In Standing pes planus bilaterally with poorly supportive shoes    Knee/Hip Strength Comments hip flexion bilaterally 4-/5, hip extension 4-/5 with back pain   Step Test Height Control Comment Did not complete secondary to back pain at this time.    Lachmans Test negative   Anterior Drawer Test negative   Posterior Drawer Test negative   Varus Stress Test negative   Valgus Stress Test negative   Joe's Test negative   Apley's Test negative   External Rotation Test negative   Apprehension Test positive bilaterally at extreme limits of patella mobility    Palpation bilateral infrapatellar tendon tenderness. patella unable to maintain within track   Accessory Motion/Joint Mobility hypomobile patella with lack of retinacular restriction    Right Knee Extension AROM 0   Right Knee Flexion AROM 130   Right Knee Flexion Strength 4-/5   Right Knee Extension Strength 4/5   Right Hip Abduction Strength 4-/5   Right Quad Set Strength 3+/5   R VMO Strength trace activation   Right Hamstring Flexibility 50 degrees with neural tension   Right Quadricep Flexibility WNL pain at knee   Left Knee Extension AROM 0   Left Knee Flexion AROM 130   Left Knee Flexion Strength 4-/5   Left Knee Extension Strength 4/5   Left Hip Abduction Strength 4-/5   Left Quad Set Strength 4-/5   L VMO Strength trace activation   Left Hamstring Flexibility 65 degrees with neural tension   Left Quadricep Flexibility WNL pain at knee   Lumbar Spine/SI  Objective Findings   Flexion ROM 75%   Extension ROM 20%   Right Side Bending ROM WNL   Left Side Bending ROM WNL   Repeated Extension-Standing ROM improved symptoms   Repeated Flexion-Standing ROM increased symptoms into left leg and in back   Pelvic Screen Secondary to fusion did not agressively assessment. Innominate rotation and leg length discrepancy noted with left posterior rotation. Pain with supine to long sitting local lower lumbar spine. Marching test demonstrates hypomobile pelvis.   Hip Screen Negative scour. Pain with left FABIR. Negative FADIR>   Transversus Abdominus Strength (Van Leg Lowering-deg) poor activation and control   Piriformis Flexibility limited left    SLR pain in lumbar spine bilaterally   Ely Test positive bilaterally   Crossover SLR positive bilaterally   Repeated Extension Prone improved symptoms   Slump Test positive bilaterally, more severe right than left   Spring Test pain at L5-S1   Segmental Mobility hypomobile L4-S1, hypermobile L1-L3   Palpation atrophy noted bilaterally throughout lumbar and lower thoracic spine as well as into sacral levels of the multifidi and paraspinals.   Planned Therapy Interventions   Planned Therapy Interventions balance training;gait training;manual therapy;ROM;strengthening;stretching;neuromuscular re-education  (pool therapy)   Clinical Impression   Criteria for Skilled Therapeutic Interventions Met yes, treatment indicated   PT Diagnosis lumbar spine dysfunction, sacroiliac dysfunction. Lumbar radiculopathy of left lower extremity. Impaired balance. Core muscle weakness, bilateral lower extremity weakness, patellar instability bilaterally. Impaired posture   Influenced by the following impairments Chronic pain, history of trauma and surgical interventions   Functional limitations due to impairments inability to engage in preferred hobby, pain, decreased activity tolerance    Clinical Presentation Evolving/Changing   Clinical Presentation  Rationale Patient with complex medical history and global pain presentation. Prone extensions improved lumbar pain and with training patient demonstrated improved quad control as evidence by evolving presentation.   Clinical Decision Making (Complexity) Moderate complexity   Therapy Frequency 2 times/Week   Predicted Duration of Therapy Intervention (days/wks) 6-8 weeks   Risk & Benefits of therapy have been explained Yes   Patient, Family & other staff in agreement with plan of care Yes   Education Assessment   Preferred Learning Style Pictures/video;Demonstration   Barriers to Learning Hearing   ORTHO GOALS   PT Ortho Eval Goals 1;2;3;4   Ortho Goal 1   Goal Identifier SLR flexion   Goal Description Patient will demonstrate ability to complete straight leg raise into flexion without extensor lag or increased back pain as evidence of improved muscular stabilization to progress towards higher level of strengthening.   Target Date 04/19/19   Ortho Goal 2   Goal Identifier Balance   Goal Description Patient will demonstrate ability to hold single limb stance on land bilaterally for 15 seconds as evidence of improved core stabilization and lower quarter strength.   Target Date 05/10/19   Ortho Goal 3   Goal Identifier Activity tolerance   Goal Description Patient will tolerate 15 minutes of continuous cardiovascular activity with the pool without need for a rest break or increased pain as evidence of improved muscular endurance for progression of program to community pool for independent weight loss program.   Target Date 05/24/19   Ortho Goal 4   Goal Identifier Radiculopathy   Goal Description Patient will report resolution of left leg radicular symptoms with her daily routine for 1 week as evidence of progress towards lumbar dysfunction resolution in order to progress towards higher level strengthening and activity for improved quality of life.   Target Date 04/26/19   Total Evaluation Time   PT Johnny, Moderate  Complexity Minutes (56499) 30   Therapy Certification   Certification date from 04/05/19   Certification date to 07/04/19   Medical Diagnosis Chronic pain syndrome, Status post lumbar fusion with radiculopathy. right knee pain, right knee patellar maltracking     Thank you for your referral.    Darline Rizvi, PT, DPT, ATC    [facility]    O: [phone]  E: [email or web address]

## 2019-04-08 NOTE — NURSING NOTE
"Katy Islas's goals for this visit include:   Chief Complaint   Patient presents with     Consult     Gastroesophageal reflux disease without esophagitis;Crohn's disease of colon with other complication;Upper GI bleed        She requests these members of her care team be copied on today's visit information: Yes    PCP: Aura Rosario    Referring Provider:  Aura Rosario PA-C  [address] [facility] [address]  [place] [address]    /78 (BP Location: Left arm, Patient Position: Sitting, Cuff Size: Adult Regular)   Pulse 121   Ht 1.575 m (5' 2.01\")   Wt 84.2 kg (185 lb 9.6 oz)   SpO2 100%   BMI 33.94 kg/m      Do you need any medication refills at today's visit? No    Cathy Benitez LPN      "

## 2019-04-08 NOTE — TELEPHONE ENCOUNTER
LPN returned patients call and  left a message for call back to discuss symptoms.     Cathy Benitez LPN

## 2019-04-08 NOTE — PROGRESS NOTES
[facility]          OUTPATIENT PHYSICAL THERAPY ORTHOPEDIC EVALUATION  PLAN OF TREATMENT FOR OUTPATIENT REHABILITATION  (COMPLETE FOR INITIAL CLAIMS ONLY)  Patient's Last Name, First Name, M.I.  YOB: 1990  Katy Islas    Provider s Name:  [facility]   Medical Record No.  [id number]   Start of Care Date:  04/05/19   Onset Date:  12/01/17(knee pain)   Type:     _X__PT   ___OT   ___SLP Medical Diagnosis:  Chronic pain syndrome, Status post lumbar fusion with radiculopathy. right knee pain, right knee patellar maltracking     PT Diagnosis:  lumbar spine dysfunction, sacroiliac dysfunction. Lumbar radiculopathy of left lower extremity. Impaired balance. Core muscle weakness, bilateral lower extremity weakness, patellar instability bilaterally. Impaired posture   Visits from [facility]:  1      _________________________________________________________________________________  Plan of Treatment/Functional Goals:  balance training, gait training, manual therapy, ROM, strengthening, stretching, neuromuscular re-education(pool therapy)     Goals  Goal Identifier: SLR flexion  Goal Description: Patient will demonstrate ability to complete straight leg raise into flexion without extensor lag or increased back pain as evidence of improved muscular stabilization to progress towards higher level of strengthening.  Target Date: 04/19/19    Goal Identifier: Balance  Goal Description: Patient will demonstrate ability to hold single limb stance on land bilaterally for 15 seconds as evidence of improved core stabilization and lower quarter strength.  Target Date: 05/10/19    Goal Identifier: Activity tolerance  Goal Description: Patient will tolerate 15 minutes of continuous cardiovascular activity with the pool without need for a rest break or increased pain as evidence of improved muscular endurance for progression of program to community pool for independent weight loss  program.  Target Date: 05/24/19    Goal Identifier: Radiculopathy  Goal Description: Patient will report resolution of left leg radicular symptoms with her daily routine for 1 week as evidence of progress towards lumbar dysfunction resolution in order to progress towards higher level strengthening and activity for improved quality of life.  Target Date: 04/26/19       Therapy Frequency:  2 times/Week  Predicted Duration of Therapy Intervention:  6-8 weeks    Darline Rizvi PT                 I CERTIFY THE NEED FOR THESE SERVICES FURNISHED UNDER        THIS PLAN OF TREATMENT AND WHILE UNDER MY CARE     (Physician co-signature of this document indicates review and certification of the therapy plan).                       Certification Date From:  04/05/19   Certification Date To:  07/04/19    Referring Provider:  Bob Monson PA-C    Initial Assessment        See Epic Evaluation Start of Care Date: 04/05/19             Thank you for your referral.    Darline Rizvi, PT, DPT, ATC    [facility]    O: [phone]  E: [email or web address]

## 2019-04-08 NOTE — TELEPHONE ENCOUNTER
[facility] Call Center    Phone Message    May a detailed message be left on voicemail: yes    Reason for Call: Other: Pt is scheduled with Junaid Pritchett for 4/22/19. Pt would like a call back from clinic to discuss her symptoms, pt is wondering if she needs to be seen sooner. Please advise.      Action Taken: Message routed to:  Adult Clinics: Gastroenterology (GI) p [phone]

## 2019-04-08 NOTE — ADDENDUM NOTE
Encounter addended by: Aura Rosario PA-C on: 4/8/2019 3:42 PM   Actions taken: Cosign clinical note with attestation

## 2019-04-08 NOTE — PATIENT INSTRUCTIONS
Upper endoscopy scheduled for 4/12/19 at 12:45 pm with Dr. Conde.   Arrive 11:45am. Fast from midnight.   Pre-op physical needed prior to procedure.     Continue taking protonix twice daily and carafate 4 times daily.     Follow up in clinic after procedure.

## 2019-04-08 NOTE — ADDENDUM NOTE
Encounter addended by: Darline Rizvi, PT on: 4/8/2019 3:17 PM   Actions taken: Sign clinical note, Document created, Document edited, Flowsheet accepted

## 2019-04-09 ENCOUNTER — TRANSFERRED RECORDS (OUTPATIENT)
Dept: HEALTH INFORMATION MANAGEMENT | Facility: CLINIC | Age: 29
End: 2019-04-09

## 2019-04-09 LAB — CRP SERPL-MCNC: <2.9 MG/L (ref 0–8)

## 2019-04-09 ASSESSMENT — ENCOUNTER SYMPTOMS
COUGH: 0
SORE THROAT: 0
CHEST TIGHTNESS: 0
LIGHT-HEADEDNESS: 0
HEMATURIA: 0
FLANK PAIN: 0
BACK PAIN: 0
DIFFICULTY URINATING: 0
SHORTNESS OF BREATH: 0
ROS GI COMMENTS: SEE HPI
FEVER: 0
UNEXPECTED WEIGHT CHANGE: 0
DYSURIA: 0
ADENOPATHY: 0
WEAKNESS: 0
PHOTOPHOBIA: 0
ARTHRALGIAS: 0
HEADACHES: 0

## 2019-04-09 NOTE — TELEPHONE ENCOUNTER
FUTURE VISIT INFORMATION      FUTURE VISIT INFORMATION:    Date: 4/16/19    Time: 10AM    Location: [facility]  REFERRAL INFORMATION:    Referring provider:  Unknown - not indicated by clinic coordinator     Referring providers clinic:  Unknown - not indicated by clinic coordinator     Reason for visit/diagnosis  New Cochlear     RECORDS REQUESTED FROM:       Clinic name Comments Records Status Imaging Status   [facility] Audiology  4/3/19 notes for ear molds  2/25/19 notes with Josue eTnorio AuD  8/27/18 notes for hearing aid check [facility]  3/18/19 notes with Shelly Kenney AuD [facility]    [facility] 4/16/19 CT Temporal bone (scheduled)   4/16/14 CT Maxillofacial   4/25/11 [facility] PACs   [facility] imaging  Ph. [phone]   02/05/2015 MRI HEAD WO W CONTRAST  08/13/2014 MRI & MRA HEAD ROUTINE WO CONTRAST    Report: care everywhere req 4/11 - received in PACS 4/12 4/11/19 10:33AM called [facility] for images to be pushed to [place], images will be in PACS before the end of the week - Amay

## 2019-04-10 ENCOUNTER — TELEPHONE (OUTPATIENT)
Dept: FAMILY MEDICINE | Facility: OTHER | Age: 29
End: 2019-04-10

## 2019-04-10 NOTE — TELEPHONE ENCOUNTER
Reason for Call:  Same Day Appointment, Requested Provider:  ZAYNAB Daley     PCP: Aura Rosario    Reason for visit: Patient needs a preop tomorrow, she is having a procedure on Friday. Can you work her in?    Duration of symptoms:     Have you been treated for this in the past? No    Additional comments:     Can we leave a detailed message on this number? NO    Phone number patient can be reached at:     Best Time:     Call taken on 4/10/2019 at 11:15 AM by Atiya Borrego

## 2019-04-10 NOTE — PROGRESS NOTES
[address]  [facility] [address]  [phone]  Dept: [phone]    PRE-OP EVALUATION:  Today's date: 2019    Katy Islas (: 1990) presents for pre-operative evaluation assessment as requested by Dr. Conde.  She requires evaluation and anesthesia risk assessment prior to undergoing surgery/procedure for treatment of Endoscopy .    Accompanied a   Proposed Surgery/ Procedure: EGD  Date of Surgery/ Procedure: 19  Time of Surgery/ Procedure: 12:45pm  Hospital/Surgical Facility: [facility]    Primary Physician: Aura Rosario  Type of Anesthesia Anticipated: to be determined    Patient has a Health Care Directive or Living Will:  NO    1. NO - Do you have a history of heart attack, stroke, stent, bypass or surgery on an artery in the head, neck, heart or legs?  2. YES - Do you ever have any pain or discomfort in your chest?- Mostly reflux  3. NO - Do you have a history of  Heart Failure?  4. NO - Are you troubled by shortness of breath when: walking on the level, up a slight hill or at night?  5. NO - Do you currently have a cold, bronchitis or other respiratory infection?  6. NO - Do you have a cough, shortness of breath or wheezing?  7. NO - Do you sometimes get pains in the calves of your legs when you walk?  8. NO - Do you or anyone in your family have previous history of blood clots?-   9. YES - Do you or does anyone in your family have a serious bleeding problem such as prolonged bleeding following surgeries or cuts? Maternal Grandmother- Bleeding Disorder  10. NO - Have you ever had problems with anemia or been told to take iron pills?  11. NO - Have you had any abnormal blood loss such as black, tarry or bloody stools, or abnormal vaginal bleeding?  12. NO - Have you ever had a blood transfusion?  13. NO - Have you or any of your relatives ever had problems with anesthesia?  14.  YES - Do you have sleep apnea, excessive snoring or daytime drowsiness?- Excessive Snoring  15. NO - Do you have any prosthetic heart valves?  16. NO - Do you have prosthetic joints?  17. NO - Is there any chance that you may be pregnant?      HPI:     HPI related to upcoming procedure: patient with history of crohn's disease , depression, migraine, bipolar disorder who presents to the clinic for a pre-op for endoscopy.      See problem list for active medical problems.  Problems all longstanding and stable, except as noted/documented.  See ROS for pertinent symptoms related to these conditions.                                                                                                                                                          .  DEPRESSION - Patient has a long history of Depression of moderate severity requiring medication for control with recent symptoms being stable..Current symptoms of depression include depressed mood.   Follows psychiatry    Chronic pain- On gabapentin for nerve pain.     Gastritis- ON pantoprazole and carafate. Moderate symptom control.                                                                                                                                                                                      .    MEDICAL HISTORY:     Patient Active Problem List    Diagnosis Date Noted     Class 1 obesity due to excess calories without serious comorbidity with body mass index (BMI) of 32.0 to 32.9 in adult 04/03/2019     Priority: Medium     Hypophosphatemia 11/01/2018     Priority: Medium     Patellar maltracking, right 09/05/2018     Priority: Medium     Right anterior knee pain 09/05/2018     Priority: Medium     Melanocytic nevus, unspecified location 07/23/2018     Priority: Medium     Dysplastic skin lesion 07/23/2018     Priority: Medium     Iliotibial band syndrome affecting lower leg, right 12/21/2017     Priority: Medium     Chondromalacia of right  patellofemoral joint 12/21/2017     Priority: Medium     Postoperative pain 11/13/2017     Priority: Medium     Closed fracture of coccyx with nonunion, subsequent encounter 10/12/2017     Priority: Medium     Upper GI bleed - suspected 07/01/2017     Priority: Medium     Tobacco use disorder 07/01/2017     Priority: Medium     History of pseudoseizure 07/01/2017     Priority: Medium     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) 03/10/2017     Priority: Medium     Lumbar disc disease with radiculopathy 02/23/2017     Priority: Medium     S/P lumbar fusion 02/23/2017     Priority: Medium     Dr. YOVANI Millan, 2/23/17       Vitamin D deficiency 01/06/2017     Priority: Medium     Atypical mole 01/06/2017     Priority: Medium     Mild persistent asthma without complication 12/02/2016     Priority: Medium     Chronic bilateral low back pain with left-sided sciatica 12/02/2016     Priority: Medium     Bee sting allergy 09/16/2016     Priority: Medium     Gastroesophageal reflux disease without esophagitis 08/26/2016     Priority: Medium     Herpes simplex virus infection 11/12/2015     Priority: Medium     Adjustment disorder with mixed anxiety and depressed mood 10/14/2015     Priority: Medium     Marijuana abuse 02/22/2015     Priority: Medium     Bipolar disorder (H) 01/31/2013     Priority: Medium     Problem list name updated by automated process. Provider to review       Nausea and vomiting 12/13/2012     Priority: Medium     Oral thrush 06/25/2012     Priority: Medium     Chronic pain 02/09/2012     Priority: Medium     Patient is followed by Aura Rosario PA-C for ongoing prescription of pain medication.  All refills should only be approved by this provider, or covering partner.    Medication(s): Norco    Maximum quantity per month: 70  Clinic visit frequency required: Q 2 months     Controlled substance agreement:   Discussed and signed 4/25/17    Encounter-Level CSA - 01/12/2015:                  Controlled Substance Agreement - Scan on 1/26/2015  9:14 AM : Controlled Medication Agreement-01/12/15 (below)            Pain Clinic evaluation in the past: Yes       Date/Location:   [facility], fall 2016    DIRE Total Score(s):    4/25/2017   Total Score 17       Last [facility] website verification:  done on 4/25/17 by LOVE Maki   [email or web address]           Anxiety 09/22/2011     Priority: Medium     Mild major depression (H) 08/29/2011     Priority: Medium     Mild intermittent asthma 10/12/2009     Priority: Medium     Overview:   Formatting of this note might be different from the original.  Action plan: See letter 4/10/2013   ATAQ:   Asthma Data 4/10/2013   Date completed 4/10/2013   Missed daily activities no = 0   Wake at night no = 0   Believe asthma in control yes = 0   ARIN use yes   Maximum ARIN use in 1 day 1-4 = 0   ATAQ score 0 = well controlled   Asthma ED visits in past 12 mos 0   Asthma hospitalizations in past 12 mos 0     Current Outpatient Prescriptions   Medication Sig     albuterol (PROVENTIL) 0.083 % neb solution Inhale 3 mL via a nebulizer every 4 hours if needed for Shortness Of Breath.        Crohn's colitis (H) 08/04/2009     Priority: Medium     Dysmenorrhea 05/11/2007     Priority: Medium     Benign neoplasm of skin of lower limb, including hip 03/16/2004     Priority: Medium     Migraine      Priority: Medium     Dr. Farrar - Neurology.  Now on Inderal.  Seems to be working.  Follow-up 9/08  Problem list name updated by automated process. Provider to review       Hearing loss      Priority: Medium     Congenital  Problem list name updated by automated process. Provider to review       Allergic rhinitis      Priority: Medium     Problem list name updated by automated process. Provider to review        Past Medical History:   Diagnosis Date     Abdominal pain 10/31- 11/4/2005    [facility] admit for [facility]     Allergic rhinitis, cause unspecified      Allergic rhinitis     Arthritis      C. difficile diarrhea     Past, no current diarrhea.     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, without mention of intractable epilepsy     pseudoseizures diagnosed after extensive neurologic eval     Migraine 07/21/12    D/C 07/22/12-[facility]     Migraine, unspecified, without mention of intractable migraine without mention of status migrainosus     Migraine     Mild intermittent asthma     mild intermittent     Mycoplasma infection in conditions classified elsewhere and of unspecified site      Other chronic pain     Back pain for 6 years     Renal disease     Kidney stones     Unspecified hearing loss     congenital hearing loss     Past Surgical History:   Procedure Laterality Date     COLONOSCOPY  7/1/2009    [facility] [place]     FUSION SPINE POSTERIOR MINIMALLY INVASIVE ONE LEVEL N/A 2/23/2017    Procedure: FUSION SPINE POSTERIOR MINIMALLY INVASIVE ONE LEVEL;  L4-5 Oblique Lateral Lumbar Interbody Fusion   Epidural steroid injection.   Transpedicular Bone marrow aspiration;  Surgeon: Jeniffer Eugene MD;  Location: [facility] OR     HC COLONOSCOPY THRU STOMA WITH BIOPSY  10/29/2003    Impression is that of normal appearing colonoscopy, without evidence of rectal bleeding.     HC COLONOSCOPY THRU STOMA, DIAGNOSTIC  10/00    normal     HC COLONOSCOPY THRU STOMA, DIAGNOSTIC  Oct 2009    Dr López- normal     HC EEG AWAKE AND SLEEP      abnormal     HC MRI BRAIN W/O CONTRAST  12/00    normal     HC REMOVAL GALLBLADDER  8/5/2009     [facility], [place].      UGI ENDOSCOPY DIAG W BIOPSY  11/11/09    Normal esophagus     HC UGI ENDOSCOPY, SIMPLE EXAM  7/00, 10/00    mild chronic esophagitis and duodenitis, neg H pylori     HC UGI ENDOSCOPY, SIMPLE EXAM  01/20/2005    Esophagogastroduodenoscopy, colonoscopy with biopsies.  [facility]     HC UGI ENDOSCOPY, SIMPLE EXAM  7/1/2009    [facility], [place]      UGI ENDOSCOPY, SIMPLE EXAM  11/11/2009    attempted upper GI, pt. could not tolerate procedure:[facility]     ORTHOPEDIC SURGERY  October 19,2011    diskectomy L4-L5     Current Outpatient Medications   Medication Sig Dispense Refill     acetaminophen (TYLENOL) 500 MG tablet Take 2 tablets (1,000 mg) by mouth 3 times daily as needed for mild pain 100 tablet 0     albuterol (2.5 MG/3ML) 0.083% neb solution Take 1 vial (2.5 mg) by nebulization every 6 hours as needed for shortness of breath / dyspnea or wheezing 50 vial 11     albuterol (PROAIR HFA/PROVENTIL HFA/VENTOLIN HFA) 108 (90 Base) MCG/ACT Inhaler Inhale 2 puffs into the lungs every 6 hours as needed for shortness of breath / dyspnea or wheezing 3 Inhaler 3     ARIPiprazole (ABILIFY) 15 MG tablet Take 1 tablet (15 mg) by mouth At Bedtime 90 tablet 3     busPIRone (BUSPAR) 15 MG tablet TK 1 T PO BID  5     clonazePAM (KLONOPIN) 0.5 MG tablet TK 1/2 TO 1 T D PRA  5     cyclobenzaprine (FLEXERIL) 10 MG tablet Take 1 tablet (10 mg) by mouth 2 times daily as needed for muscle spasms or other (back pain) 60 tablet 0     dicyclomine (BENTYL) 20 MG tablet Take 1 tablet (20 mg) by mouth 4 times daily as needed (ABDOMINAL CRAMPING) AS NEEDED FOR CRAMPING 60 tablet 0     DULoxetine (CYMBALTA) 60 MG EC capsule Take 1 capsule (60 mg) by mouth daily 90 capsule 3     EPINEPHrine (EPIPEN/ADRENACLICK/OR ANY BX GENERIC EQUIV) 0.3 MG/0.3ML injection 2-pack Inject 0.3 mLs (0.3 mg) into the muscle once as needed for anaphylaxis 0.6 mL 3     gabapentin  (NEURONTIN) 300 MG capsule Take 1-3 capsules (300-900 mg) by mouth 3 times daily 270 capsule 5     levocetirizine (XYZAL) 5 MG tablet Take 1 tablet (5 mg) by mouth every evening 90 tablet 3     medical cannabis inhalation (Patient's own supply.  Not a prescription) Inhale into the lungs 3 times daily as needed Reported on 3/28/2017       medroxyPROGESTERone (DEPO-PROVERA) 150 MG/ML IM injection Inject 1 mL (150 mg) into the muscle every 3 months 3 mL 3     NONFORMULARY Apply 30 mLs topically 4 times daily       nystatin (MYCOSTATIN) cream nystatin 100,000 unit/gram topical cream       ondansetron (ZOFRAN ODT) 4 MG ODT tab Take 1 tablet (4 mg) by mouth every 6 hours as needed for nausea 30 tablet 0     pantoprazole (PROTONIX) 40 MG EC tablet Take 1 tablet (40 mg) by mouth daily Take 30-60 minutes before a meal. (Patient taking differently: Take 40 mg by mouth 2 times daily Take 30-60 minutes before a meal.) 30 tablet 3     polyethylene glycol (MIRALAX) powder Take 17 g (1 capful) by mouth daily 510 g 1     prazosin (MINIPRESS) 1 MG capsule Take 1 mg by mouth At Bedtime  5     rizatriptan (MAXALT) 5 MG tablet Take 1-2 tablets (5-10 mg) by mouth at onset of headache for migraine May repeat in 2 hours. Max 6 tablets/24 hours. 18 tablet 3     rizatriptan (MAXALT-MLT) 5 MG ODT tab Take 1-2 tablets (5-10 mg) by mouth at onset of headache for migraine May repeat in 2 hours. Max 6 tablets/24 hours. 18 tablet 3     sucralfate (CARAFATE) 1 GM tablet Take 1 tablet (1 g) by mouth 4 times daily as needed (heartburn) 360 tablet 3     varenicline (CHANTIX ILAN) 0.5 MG X 11 & 1 MG X 42 tablet Take 0.5 mg tab daily for 3 days, THEN 0.5 mg tab twice daily for 4 days, THEN 1 mg twice daily. 53 tablet 0     OTC products: None, except as noted above    Allergies   Allergen Reactions     Ativan [Lorazepam] Hives     At the IV site     Baclofen      hives     Bees Hives, Swelling and Difficulty breathing     Caffeine      Contrast Dye       "Hives,   Updated 5/10/2016 CT Contrast.     Iodine Hives     Methocarbamol Swelling     Metoclopramide      Other reaction(s): Tremors  LW Reaction: shaking/sweating     Midazolam      Other reaction(s): Agitation     Monosodium Glutamate      Morphine Other (See Comments)     Difficulty with urination     Nsaids Other (See Comments)     GI BLEED x2     Other (Do Not Use) Other (See Comments)     Xanaflex- pt becomes disoriented and loses bladder control     Reglan [Metoclopramide Hcl] Other (See Comments)     shaking     Soma [Carisoprodol] Visual Disturbance     Sleep walking     Tizanidine      Topamax Other (See Comments)     Topamax [Topiramate] Nausea     Tingling  GI/Vomit     Tramadol      Severe Headache, Seizure Risk     Tylenol W/Codeine [Acetaminophen-Codeine] Nausea and Itching     Tylenol 3     Valium Other (See Comments)     Becomes aggressive and angry     Versed Other (See Comments)     Zolmitriptan      Makes face feel like its twitching     Droperidol Anxiety     Flu Virus Vaccine Rash      Arm swelling      Latex Allergy: NO    Social History     Tobacco Use     Smoking status: Current Every Day Smoker     Packs/day: 0.50     Years: 5.00     Pack years: 2.50     Types: Cigarettes     Smokeless tobacco: Never Used   Substance Use Topics     Alcohol use: Yes     Comment: Not since last July     History   Drug Use     Types: Marijuana     Comment: Medical Marijuana currently       REVIEW OF SYSTEMS:   Constitutional, neuro, ENT, endocrine, pulmonary, cardiac, gastrointestinal, genitourinary, musculoskeletal, integument and psychiatric systems are negative, except as otherwise noted.    EXAM:   /64 (BP Location: Right arm, Patient Position: Chair, Cuff Size: Adult Regular)   Pulse 126   Temp 97.9  F (36.6  C) (Temporal)   Resp 16   Ht 1.575 m (5' 2.01\")   Wt 83.5 kg (184 lb)   SpO2 100%   BMI 33.64 kg/m      GENERAL APPEARANCE: healthy, alert and no distress     EYES: EOMI, PERRL     " HENT: ear canals and TM's normal and nose and mouth without ulcers or lesions     NECK: no adenopathy, no asymmetry, masses, or scars and thyroid normal to palpation     RESP: lungs clear to auscultation - no rales, rhonchi or wheezes     CV: regular rates and rhythm, normal S1 S2, no S3 or S4 and no murmur, click or rub     ABDOMEN:  soft, nontender, no HSM or masses and bowel sounds normal     MS: extremities normal- no gross deformities noted, no evidence of inflammation in joints, FROM in all extremities.     SKIN: no suspicious lesions or rashes     NEURO: Normal strength and tone, sensory exam grossly normal, mentation intact and speech normal     PSYCH: mentation appears normal. and affect normal/bright     LYMPHATICS: No cervical adenopathy    DIAGNOSTICS:   EKG: Not indicated due to non-vascular surgery and low risk of event (age <65 and without cardiac risk factors)    Recent Labs   Lab Test 04/03/19  1014 04/02/19 02/27/19 02/11/19  2140 01/11/19  1940  09/13/18  1431  10/12/17  1358  02/13/17  1114   HGB 13.8  --   --  13.8 13.4   < >  --    < > 12.9   < > 14.2     --   --  286 269   < >  --    < > 249   < > 279   INR  --   --   --   --   --   --   --   --  1.11  --  1.00   NA  --   --   --  144 143   < >  --    < > 140   < > 139   POTASSIUM  --  3.9 4.3 3.6 4.4   < >  --    < > 3.9   < > 4.4   CR  --  0.90 0.87 0.85 0.83   < >  --    < > 0.73   < > 0.75   A1C  --   --   --   --   --   --  5.0  --   --   --   --     < > = values in this interval not displayed.        IMPRESSION:   Reason for surgery/procedure: gastritis  Diagnosis/reason for consult: cardiovascular risk assessment    The proposed surgical procedure is considered LOW risk.    REVISED CARDIAC RISK INDEX  The patient has the following serious cardiovascular risks for perioperative complications such as (MI, PE, VFib and 3  AV Block):  No serious cardiac risks  INTERPRETATION: 0 risks: Class I (very low risk - 0.4% complication  rate)    The patient has the following additional risks for perioperative complications:  The ASCVD Risk score ([place] VILLARREAL Jr., et al., 2013) failed to calculate for the following reasons:    The 2013 ASCVD risk score is only valid for ages 40 to 79      ICD-10-CM    1. Screening for HIV (human immunodeficiency virus) Z11.4    2. Preop general physical exam Z01.818    3. Mild major depression (H) F32.0    4. Crohn's disease of colon with other complication (H) K50.118    5. Grand mal seizure disorder (H) G40.409        RECOMMENDATIONS:       Cardiovascular Risk  Performs 4 METs exercise without symptoms (Light housework (dusting, washing dishes), Climb a flight of stairs, Walk on level ground at 15 minutes per mile (4 miles/hour) and Slow step dance) .   Has sinus tachycardia likely due to underlying radicular pain .      Pulmonary Risk  Incentive spirometry post op  Respiratory Therapy (Respiratory Care IP Consult)  post op      Obstructive Sleep Apnea (or suspected sleep apnea)  Hospital staff are advised to monitor for sleep related oxygen desaturations due to suspicion of AISHA      --Patient is to take all scheduled medications on the day of surgery EXCEPT for modifications listed below.    Anticoagulant or Antiplatelet Medication Use  NSAIDS: avoid for 1week prior to procedure        APPROVAL GIVEN to proceed with proposed procedure, without further diagnostic evaluation       Signed Electronically by: Jessica Rios MD    Copy of this evaluation report is provided to requesting physician.    [facility] Preop Guidelines    Revised Cardiac Risk Index

## 2019-04-11 ENCOUNTER — TELEPHONE (OUTPATIENT)
Dept: FAMILY MEDICINE | Facility: OTHER | Age: 29
End: 2019-04-11

## 2019-04-11 ENCOUNTER — ANESTHESIA EVENT (OUTPATIENT)
Dept: SURGERY | Facility: AMBULATORY SURGERY CENTER | Age: 29
End: 2019-04-11

## 2019-04-11 ENCOUNTER — MYC MEDICAL ADVICE (OUTPATIENT)
Dept: FAMILY MEDICINE | Facility: OTHER | Age: 29
End: 2019-04-11

## 2019-04-11 ENCOUNTER — OFFICE VISIT (OUTPATIENT)
Dept: FAMILY MEDICINE | Facility: OTHER | Age: 29
End: 2019-04-11
Payer: MEDICARE

## 2019-04-11 VITALS
OXYGEN SATURATION: 100 % | TEMPERATURE: 97.9 F | WEIGHT: 184 LBS | HEIGHT: 62 IN | HEART RATE: 126 BPM | RESPIRATION RATE: 16 BRPM | BODY MASS INDEX: 33.86 KG/M2 | DIASTOLIC BLOOD PRESSURE: 64 MMHG | SYSTOLIC BLOOD PRESSURE: 116 MMHG

## 2019-04-11 DIAGNOSIS — K50.118 CROHN'S DISEASE OF COLON WITH OTHER COMPLICATION (H): ICD-10-CM

## 2019-04-11 DIAGNOSIS — F32.0 MILD MAJOR DEPRESSION (H): ICD-10-CM

## 2019-04-11 DIAGNOSIS — Z01.818 PREOP GENERAL PHYSICAL EXAM: ICD-10-CM

## 2019-04-11 DIAGNOSIS — G40.409 GRAND MAL SEIZURE DISORDER (H): ICD-10-CM

## 2019-04-11 DIAGNOSIS — Z11.4 SCREENING FOR HIV (HUMAN IMMUNODEFICIENCY VIRUS): Primary | ICD-10-CM

## 2019-04-11 PROCEDURE — 99214 OFFICE O/P EST MOD 30 MIN: CPT | Performed by: FAMILY MEDICINE

## 2019-04-11 RX ORDER — ONDANSETRON 2 MG/ML
4 INJECTION INTRAMUSCULAR; INTRAVENOUS EVERY 30 MIN PRN
Status: CANCELLED | OUTPATIENT
Start: 2019-04-11

## 2019-04-11 RX ORDER — HYDROMORPHONE HYDROCHLORIDE 1 MG/ML
.3-.5 INJECTION, SOLUTION INTRAMUSCULAR; INTRAVENOUS; SUBCUTANEOUS EVERY 10 MIN PRN
Status: CANCELLED | OUTPATIENT
Start: 2019-04-11

## 2019-04-11 RX ORDER — FENTANYL CITRATE 50 UG/ML
25-50 INJECTION, SOLUTION INTRAMUSCULAR; INTRAVENOUS
Status: CANCELLED | OUTPATIENT
Start: 2019-04-11

## 2019-04-11 RX ORDER — MEPERIDINE HYDROCHLORIDE 25 MG/ML
12.5 INJECTION INTRAMUSCULAR; INTRAVENOUS; SUBCUTANEOUS
Status: CANCELLED | OUTPATIENT
Start: 2019-04-11

## 2019-04-11 RX ORDER — SODIUM CHLORIDE, SODIUM LACTATE, POTASSIUM CHLORIDE, CALCIUM CHLORIDE 600; 310; 30; 20 MG/100ML; MG/100ML; MG/100ML; MG/100ML
INJECTION, SOLUTION INTRAVENOUS CONTINUOUS
Status: CANCELLED | OUTPATIENT
Start: 2019-04-11

## 2019-04-11 RX ORDER — ONDANSETRON 4 MG/1
4 TABLET, ORALLY DISINTEGRATING ORAL EVERY 30 MIN PRN
Status: CANCELLED | OUTPATIENT
Start: 2019-04-11

## 2019-04-11 RX ORDER — NALOXONE HYDROCHLORIDE 0.4 MG/ML
.1-.4 INJECTION, SOLUTION INTRAMUSCULAR; INTRAVENOUS; SUBCUTANEOUS
Status: CANCELLED | OUTPATIENT
Start: 2019-04-11 | End: 2019-04-12

## 2019-04-11 ASSESSMENT — MIFFLIN-ST. JEOR: SCORE: 1508.03

## 2019-04-11 ASSESSMENT — PAIN SCALES - GENERAL: PAINLEVEL: SEVERE PAIN (6)

## 2019-04-11 NOTE — TELEPHONE ENCOUNTER
Our pain contract was for pre-operative time before her last back surgery and is no longer in effect.     I need more information before I could consider giving her pain medications. I would need an appointment to discuss all this.     Zach Rosario PA-C   [place]  [place]

## 2019-04-11 NOTE — TELEPHONE ENCOUNTER
Reason for Call:  Medication or medication refill:    Do you use a [facility]?  Name of the pharmacy and phone number for the current request:  [facility] - [phone]    Name of the medication requested: Norco    Other request: Patient states that she cant sleep at night due to her herniated disc. Requesting an RX for Norco.     Can we leave a detailed message on this number? YES    Phone number patient can be reached at: Cell number on file:    Telephone Information:   Mobile [phone]       Best Time: Any    Call taken on 4/11/2019 at 1:10 PM by Mariusz Izquierdo

## 2019-04-11 NOTE — TELEPHONE ENCOUNTER
Patient was informed when she was in clinic to see RK for a pre-op.  Patient stated her spine specialist will not prescribe them and wants her PCP to only manage controlled substances.  Will route back to [facility] for review.  (RK would not prescribe as she has a controlled substance agreement with [facility] and the office visit was for a pre-op only.)  Oneyda Tapia CMA ([facility])

## 2019-04-12 ENCOUNTER — HOSPITAL ENCOUNTER (OUTPATIENT)
Facility: AMBULATORY SURGERY CENTER | Age: 29
Discharge: HOME | End: 2019-04-12
Attending: INTERNAL MEDICINE | Admitting: INTERNAL MEDICINE
Payer: MEDICARE

## 2019-04-12 ENCOUNTER — ANESTHESIA (OUTPATIENT)
Dept: SURGERY | Facility: AMBULATORY SURGERY CENTER | Age: 29
End: 2019-04-12

## 2019-04-12 VITALS
TEMPERATURE: 98.2 F | RESPIRATION RATE: 16 BRPM | OXYGEN SATURATION: 95 % | SYSTOLIC BLOOD PRESSURE: 113 MMHG | DIASTOLIC BLOOD PRESSURE: 83 MMHG | HEART RATE: 103 BPM

## 2019-04-12 VITALS — HEART RATE: 104 BPM

## 2019-04-12 LAB
HCG UR QL: NEGATIVE
UPPER GI ENDOSCOPY: NORMAL

## 2019-04-12 PROCEDURE — G8918 PT W/O PREOP ORDER IV AB PRO: HCPCS

## 2019-04-12 PROCEDURE — 81025 URINE PREGNANCY TEST: CPT | Performed by: ANESTHESIOLOGY

## 2019-04-12 PROCEDURE — G8907 PT DOC NO EVENTS ON DISCHARG: HCPCS

## 2019-04-12 PROCEDURE — 43239 EGD BIOPSY SINGLE/MULTIPLE: CPT

## 2019-04-12 RX ORDER — LIDOCAINE HYDROCHLORIDE 20 MG/ML
INJECTION, SOLUTION INFILTRATION; PERINEURAL PRN
Status: DISCONTINUED | OUTPATIENT
Start: 2019-04-12 | End: 2019-04-12

## 2019-04-12 RX ORDER — PROPOFOL 10 MG/ML
INJECTION, EMULSION INTRAVENOUS PRN
Status: DISCONTINUED | OUTPATIENT
Start: 2019-04-12 | End: 2019-04-12

## 2019-04-12 RX ORDER — LIDOCAINE 40 MG/G
CREAM TOPICAL
Status: DISCONTINUED | OUTPATIENT
Start: 2019-04-12 | End: 2019-04-13 | Stop reason: HOSPADM

## 2019-04-12 RX ORDER — ONDANSETRON 2 MG/ML
4 INJECTION INTRAMUSCULAR; INTRAVENOUS
Status: COMPLETED | OUTPATIENT
Start: 2019-04-12 | End: 2019-04-12

## 2019-04-12 RX ORDER — FENTANYL CITRATE 50 UG/ML
25-50 INJECTION, SOLUTION INTRAMUSCULAR; INTRAVENOUS
Status: DISCONTINUED | OUTPATIENT
Start: 2019-04-12 | End: 2019-04-13 | Stop reason: HOSPADM

## 2019-04-12 RX ORDER — LIDOCAINE 40 MG/G
CREAM TOPICAL
Status: CANCELLED | OUTPATIENT
Start: 2019-04-12

## 2019-04-12 RX ORDER — ONDANSETRON 2 MG/ML
4 INJECTION INTRAMUSCULAR; INTRAVENOUS
Status: CANCELLED | OUTPATIENT
Start: 2019-04-12

## 2019-04-12 RX ORDER — SODIUM CHLORIDE, SODIUM LACTATE, POTASSIUM CHLORIDE, CALCIUM CHLORIDE 600; 310; 30; 20 MG/100ML; MG/100ML; MG/100ML; MG/100ML
INJECTION, SOLUTION INTRAVENOUS CONTINUOUS
Status: DISCONTINUED | OUTPATIENT
Start: 2019-04-12 | End: 2019-04-13 | Stop reason: HOSPADM

## 2019-04-12 RX ADMIN — ONDANSETRON 4 MG: 2 INJECTION INTRAMUSCULAR; INTRAVENOUS at 13:18

## 2019-04-12 RX ADMIN — PROPOFOL 30 MG: 10 INJECTION, EMULSION INTRAVENOUS at 12:43

## 2019-04-12 RX ADMIN — SODIUM CHLORIDE, SODIUM LACTATE, POTASSIUM CHLORIDE, CALCIUM CHLORIDE: 600; 310; 30; 20 INJECTION, SOLUTION INTRAVENOUS at 12:36

## 2019-04-12 RX ADMIN — LIDOCAINE HYDROCHLORIDE 60 MG: 20 INJECTION, SOLUTION INFILTRATION; PERINEURAL at 12:42

## 2019-04-12 RX ADMIN — SODIUM CHLORIDE, SODIUM LACTATE, POTASSIUM CHLORIDE, CALCIUM CHLORIDE: 600; 310; 30; 20 INJECTION, SOLUTION INTRAVENOUS at 12:33

## 2019-04-12 RX ADMIN — PROPOFOL 50 MG: 10 INJECTION, EMULSION INTRAVENOUS at 12:42

## 2019-04-12 ASSESSMENT — LIFESTYLE VARIABLES: TOBACCO_USE: 1

## 2019-04-12 NOTE — PROGRESS NOTES
SUBJECTIVE:   Katy Islas is a 29 year old female who presents to clinic today for the following health issues:      HPI  Neck & Back Pain Follow Up      Description:   Location of pain:  left  Character of pain: sharp and dull ache  Pain radiation: shoot down arm  Since last visit, pain is:  worsened  New numbness or weakness in legs, not attributed to pain:  no     Intensity: Currently 6/10    History:   Pain interferes with job: Not applicable  Therapies tried without relief: n/a   Therapies tried with relief: Epson salt, cold and heat     - Saw surgeon last week (Dr. Millan) told herniated disk in neck c8 - Mylogram in 5/14 (MRI at [facility] in [place])   - Hard to sleep  - Can't sleep on left side   - Left arm goes numb at night   - Hopefully injection after myelogram  - Also physical therapy   - Waiting on lower back for now   - Gabapentin - weight gain?        told to take 9/day, taking 3 at night    - Stopped Valium and wasn't working      - Taking Flexeril at night helps     - Migraines due to change in weather and allergies      Went to ED, got Toradol, wondering if can get one today along with Benadryl that helps at night         Additional history: as documented      ROS:  Constitutional, HEENT, cardiovascular, pulmonary, gi and gu systems are negative, except as otherwise noted.    OBJECTIVE:   /84   Pulse 110   Temp 98.4  F (36.9  C) (Temporal)   Resp 16   Wt 85.9 kg (189 lb 6.4 oz)   LMP  (LMP Unknown)   SpO2 96%   BMI 34.63 kg/m    Body mass index is 34.63 kg/m .  GENERAL APPEARANCE: healthy, alert and no distress  EYES: Eyes grossly normal to inspection, PERRLA, conjunctivae and sclerae without injection or discharge, EOM intact   MS: No musculoskeletal defects are noted and gait is age appropriate without ataxia   SKIN: No suspicious lesions or rashes, hydration status appears adeuqate with normal skin turgor   BACK: Declined by patient   PSYCH: Alert and oriented x3; speech-  coherent , normal rate and volume; able to articulate logical thoughts, able to abstract reason, no tangential thoughts, no hallucinations or delusions, mentation appears normal, Mood is euthymic. Affect is appropriate for this mood state and bright. Thought content is free of suicidal ideation, hallucinations, and delusions. Dress is adequate and upkept. Eye contact is good during conversation.       Diagnostic Test Results:  none     ASSESSMENT/PLAN:       ICD-10-CM    1. Chronic bilateral low back pain with left-sided sciatica M54.42 HYDROcodone-acetaminophen (NORCO) 5-325 MG tablet    G89.29    2. Cervicalgia M54.2 HYDROcodone-acetaminophen (NORCO) 5-325 MG tablet   3. Migraine without aura and without status migrainosus, not intractable G43.009 ketorolac (TORADOL) injection 30 mg   4. Mild intermittent asthma without complication J45.20 diphenhydrAMINE (BENADRYL) 50 MG capsule     -  reviewed     Last fill was 12/14/18 from myself of Percocet  #[phone]     Before that routine fills from myself or her surgeon   - Discussed agree with stopping Valium, can't mix with Klonopin from psychiatry and per patient wasn't working anyway   - GERMAN signed, will get updated records to confirm story about neck   - For now agreed on      Norco 5-325 mg, max 2 per day     Continue Flexeril  - Gabapentin     Per patient surgeon wants her to take more, but can't due to sedation, also worried it is causing weight gain       Discussed should taper before stopping, taking 3 at night, so should do 2 for 1 week then 1 for one week, then stop to see if weight gain improves   - Recheck 2 weeks, discussed won't give more until I have records   - Continue plan from surgeon, will review records when they arrive     - Migraine     Patient requesting Toradol injection, reviewed ED notes, was given 30 mg, will repeat this, reviewed use and side effects       Will also give Bendaryl, reviewed use and side effects     The patient indicates  understanding of these issues and agrees with the plan.    Follow up: 2 weeks       Aura Rosario PA-C  [facility]

## 2019-04-12 NOTE — ANESTHESIA POSTPROCEDURE EVALUATION
Anesthesia POST Procedure Evaluation    Patient: Katy Islas   MRN:     [id number] Gender:   female   Age:    29 year old :      1990        Preoperative Diagnosis: EGD   Procedure(s):  COMBINED ESOPHAGOSCOPY, GASTROSCOPY, DUODENOSCOPY (EGD) WITH CO2 INSUFFLATION  Combined Esophagoscopy, Gastroscopy, Duodenoscopy (Egd), Biopsy Single Or Multiple   Postop Comments: No value filed.       Anesthesia Type:  MAC    Reportable Event: NO     PAIN: Uncomplicated   Sign Out status: Comfortable, Well controlled pain     PONV: No PONV   Sign Out status:  No Nausea or Vomiting     Neuro/Psych: Uneventful perioperative course   Sign Out Status: Preoperative baseline; Age appropriate mentation     Airway/Resp.: Uneventful perioperative course   Sign Out Status: Non labored breathing, age appropriate RR; Resp. Status within EXPECTED Parameters     CV: Uneventful perioperative course   Sign Out status: Appropriate BP and perfusion indices; Appropriate HR/Rhythm     Disposition:   Sign Out in:  Phase II  Recovery Course: Uneventful  Follow-Up: Not required           Last Anesthesia Record Vitals:  CRNA VITALS  2019 1227 - 2019 1308      2019             Pulse:  103    SpO2:  99 %          Last PACU Vitals:  Vitals Value Taken Time   /66 2019 12:57 PM   Temp     Pulse 107 2019 12:57 PM   Resp 16 2019 12:57 PM   SpO2 95 % 2019 12:57 PM   Temp src Skin 2019 12:45 PM   NIBP 110/76 2019 12:55 PM   Pulse 103 2019 12:56 PM   SpO2 99 % 2019 12:56 PM   Resp     Temp 96.8  F (36  C) 2019 12:51 PM   Ht Rate 101 2019 12:52 PM   Temp 2           Electronically Signed By: Victorino Matson MD, 2019, 1:08 PM

## 2019-04-12 NOTE — ANESTHESIA PREPROCEDURE EVALUATION
Anesthesia Pre-Procedure Evaluation    Patient: Katy Islas   MRN:     [id number] Gender:   female   Age:    29 year old :      1990        Preoperative Diagnosis: EGD   Procedure(s):  COMBINED ESOPHAGOSCOPY, GASTROSCOPY, DUODENOSCOPY (EGD) WITH CO2 INSUFFLATION     Past Medical History:   Diagnosis Date     Abdominal pain 10/31- 2005    [facility]     Allergic rhinitis, cause unspecified     Allergic rhinitis     Arthritis      C. difficile diarrhea     Past, no current diarrhea.     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, without mention of intractable epilepsy     pseudoseizures diagnosed after extensive neurologic eval     Migraine 12    D/C 12-[facility]     Migraine, unspecified, without mention of intractable migraine without mention of status migrainosus     Migraine     Mild intermittent asthma     mild intermittent     Mycoplasma infection in conditions classified elsewhere and of unspecified site      Other chronic pain     Back pain for 6 years     Renal disease     Kidney stones     Unspecified hearing loss     congenital hearing loss      Past Surgical History:   Procedure Laterality Date     COLONOSCOPY  2009    [facility].     FUSION SPINE POSTERIOR MINIMALLY INVASIVE ONE LEVEL N/A 2017    Procedure: FUSION SPINE POSTERIOR MINIMALLY INVASIVE ONE LEVEL;  L4-5 Oblique Lateral Lumbar Interbody Fusion   Epidural steroid injection.   Transpedicular Bone marrow aspiration;  Surgeon:  Jeniffer Eugene MD;  Location: [facility] OR     HC COLONOSCOPY THRU STOMA WITH BIOPSY  10/29/2003    Impression is that of normal appearing colonoscopy, without evidence of rectal bleeding.     HC COLONOSCOPY THRU STOMA, DIAGNOSTIC  10/00    normal     HC COLONOSCOPY THRU STOMA, DIAGNOSTIC  Oct 2009    Dr López- normal     HC EEG AWAKE AND SLEEP      abnormal     HC MRI BRAIN W/O CONTRAST  12/00    normal     HC REMOVAL GALLBLADDER  8/5/2009    [facility], [facility].     HC UGI ENDOSCOPY DIAG W BIOPSY  11/11/09    Normal esophagus     HC UGI ENDOSCOPY, SIMPLE EXAM  7/00, 10/00    mild chronic esophagitis and duodenitis, neg H pylori     HC UGI ENDOSCOPY, SIMPLE EXAM  01/20/2005    Esophagogastroduodenoscopy, colonoscopy with biopsies.  [facility]     HC UGI ENDOSCOPY, SIMPLE EXAM  7/1/2009    [facility], [facility].      UGI ENDOSCOPY, SIMPLE EXAM  11/11/2009    attempted upper GI, pt. could not tolerate procedure:[facility]     ORTHOPEDIC SURGERY  October 19,2011    diskectomy L4-L5          Anesthesia Evaluation     . Pt has had prior anesthetic. Type: General and MAC    No history of anesthetic complications          ROS/MED HX    ENT/Pulmonary: Comment: Medical marijuana by vaporizer    (+)other ENT- Severe hearing loss, tobacco use, Current use Intermittent asthma Treatment: Inhaler prn,  , . .    Neurologic: Comment: Pseudo-seizures diagnosed after work-up for seizure disorder    (+)migraines,     Cardiovascular:  - neg cardiovascular ROS       METS/Exercise Tolerance:     Hematologic:  - neg hematologic  ROS       Musculoskeletal:   (+)  other musculoskeletal- Lumbar disc disease with chronic pain      GI/Hepatic: Comment: Sometimes with bile reflux. Crohn's colitis    (+) GERD Symptomatic, Inflammatory bowel disease,       Renal/Genitourinary:     (+) Nephrolithiasis ,       Endo:  - neg endo ROS       Psychiatric:     (+) psychiatric history anxiety, depression and  "bipolar      Infectious Disease:  - neg infectious disease ROS       Malignancy:      - no malignancy   Other:    (+) H/O Chronic Pain,  - neg other ROS                     PHYSICAL EXAM:   Mental Status/Neuro: Mental Status:: Severe hearing loss.   Airway: Facies: Feasible  Mallampati: I  Mouth/Opening: Full  TM distance: > 6 cm  Neck ROM: Full   Respiratory: Auscultation: CTAB     Resp. Rate: Normal     Resp. Effort: Normal      CV: Rhythm: Regular  Rate: Age appropriate  Heart: Normal Sounds   Comments:      Dental: Normal     Habitus: Normal  Bowel sounds: Normal  Abd. Exam: Normal  MSK: Normal  Injury: None  Skin: Normal         Lab Results   Component Value Date    WBC 6.4 04/03/2019    HGB 13.8 04/03/2019    HCT 40.4 04/03/2019     04/03/2019    CRP <2.9 04/03/2019    SED 34 (H) 11/20/2018     02/11/2019    POTASSIUM 3.9 04/02/2019    CHLORIDE 110 (H) 02/11/2019    CO2 27 02/11/2019    BUN 17 02/11/2019    CR 0.90 04/02/2019    GLC 91 04/03/2019    SARANYA 8.9 02/11/2019    PHOS 4.5 11/19/2018    MAG 1.7 05/18/2005    ALBUMIN 3.6 01/11/2019    PROTTOTAL 7.8 01/11/2019    ALT 17 04/02/2019    AST 19 04/02/2019    ALKPHOS 116 01/11/2019    BILITOTAL 0.2 01/11/2019    LIPASE 195 12/10/2018    AMYLASE 88 04/26/2004    PTT 44 (H) 10/12/2017    INR 1.11 10/12/2017    TSH 0.92 02/20/2018    T4 1.71 06/21/2012    HCG Negative 01/11/2019    HCGS Negative 02/23/2017       Preop Vitals  BP Readings from Last 3 Encounters:   04/11/19 116/64   04/08/19 127/78   04/03/19 116/82    Pulse Readings from Last 3 Encounters:   04/11/19 126   04/08/19 121   04/03/19 64      Resp Readings from Last 3 Encounters:   04/11/19 16   04/03/19 14   02/22/19 16    SpO2 Readings from Last 3 Encounters:   04/11/19 100%   04/08/19 100%   02/28/19 94%      Temp Readings from Last 1 Encounters:   04/11/19 97.9  F (36.6  C) (Temporal)    Ht Readings from Last 1 Encounters:   04/11/19 1.575 m (5' 2.01\")      Wt Readings from Last 1 " "Encounters:   04/11/19 83.5 kg (184 lb)    Estimated body mass index is 33.64 kg/m  as calculated from the following:    Height as of 4/11/19: 1.575 m (5' 2.01\").    Weight as of 4/11/19: 83.5 kg (184 lb).     LDA:  Peripheral IV 02/11/19 Right Upper forearm (Active)   Number of days: 60            Assessment:   ASA SCORE: 3    Will be NPO appropriate at: 4/12/2019 12:20 PM   Documentation: H&P complete; Preop Testing complete; Consents complete   Proceeding: Proceed without further delay  Tobacco Use:  NO Active use of Tobacco/UNKNOWN Tobacco use status     Plan:   Anes. Type:  MAC   Pre-Induction: None   Induction:  IV (Standard)   Airway: Native Airway   Access/Monitoring: PIV   Maintenance: Propofol; IV   Emergence: Procedure Site   Logistics: Same Day Surgery     Postop Pain/Sedation Strategy:  Standard-Options: Opioids PRN     PONV Management:  Adult Risk Factors: Female, Non-Smoker, Postop Opioids  Prevention: Propofol Infusion; Ondansetron     CONSENT: Direct conversation   Plan and risks discussed with: Patient   Blood Products: Consent Deferred (Minimal Blood Loss)                         Victorino Matson MD  "

## 2019-04-12 NOTE — ANESTHESIA CARE TRANSFER NOTE
Patient: Katy Islas    Procedure(s):  COMBINED ESOPHAGOSCOPY, GASTROSCOPY, DUODENOSCOPY (EGD) WITH CO2 INSUFFLATION  Combined Esophagoscopy, Gastroscopy, Duodenoscopy (Egd), Biopsy Single Or Multiple    Diagnosis: EGD  Diagnosis Additional Information: No value filed.    Anesthesia Type:   No value filed.     Note:  Airway :Room Air  Patient transferred to:Phase II  Comments: Awake, c/o abd pain post procedure, appears comfortable, vitals stable, Sats 99%< Report to RN.Handoff Report: Identifed the Patient, Identified the Reponsible Provider, Reviewed the pertinent medical history, Discussed the surgical course, Reviewed Intra-OP anesthesia mangement and issues during anesthesia, Set expectations for post-procedure period and Allowed opportunity for questions and acknowledgement of understanding      Vitals: (Last set prior to Anesthesia Care Transfer)    CRNA VITALS  4/12/2019 1227 - 4/12/2019 1302      4/12/2019             Pulse:  103    SpO2:  99 %                Electronically Signed By: NATA Busch CRNA  April 12, 2019  1:02 PM

## 2019-04-14 ENCOUNTER — NURSE TRIAGE (OUTPATIENT)
Dept: NURSING | Facility: CLINIC | Age: 29
End: 2019-04-14

## 2019-04-14 ENCOUNTER — TRANSFERRED RECORDS (OUTPATIENT)
Dept: HEALTH INFORMATION MANAGEMENT | Facility: CLINIC | Age: 29
End: 2019-04-14

## 2019-04-14 NOTE — TELEPHONE ENCOUNTER
"Sign interpretor was the caller for Katy.  Katy has a headache that is somewhat different to her migraines. She has right eye blurriness and in her peripheral vision she's seeing movement similar to seeing a fan and some type of vibration and twitching.  Her vision in that eye is like looking through wax paper.   She has pain in her left eye and the left side of her face.  She has no numbness/tingling or weakness.  She took Maxalt an hour ago and has not had any relief from her headache.  She denied fever. She had nausea but took Zofran and that has resolved. The headache was present last night but resolved. It came back today and has gotten worse over about a 30 month period of time. I instructed she be seen in an ER. She said she has a neighbor who will drive her.    Reason for Disposition    [1] Blurred vision or visual changes AND [2] present now AND [3] sudden onset or new (e.g., minutes, hours, days)  (Exception: previously diagnosed migraine headaches with same symptoms)    Additional Information    Negative: Difficult to awaken or acting confused  (e.g., disoriented, slurred speech)    Negative: [1] Weakness of the face, arm or leg on one side of the body AND [2] new onset    Negative: [1] Numbness of the face, arm or leg on one side of the body AND [2] new onset    Negative: Unable to walk, or can only walk with assistance (e.g., requires support)    Negative: Stiff neck (can't touch chin to chest)    Negative: Severe pain in one eye    Negative: [1] SEVERE headache (e.g., excruciating) AND [2] \"worst headache\" of life    Negative: [1] SEVERE headache AND [2] sudden-onset (i.e., reaching maximum intensity within seconds)    Negative: [1] SEVERE headache AND [2] fever    Negative: Loss of vision or double vision (Exception: same as prior migraines)    Negative: [1] Fever > 100.5 F (38.1 C) AND [2] diabetes mellitus or weak immune system (e.g., HIV positive, cancer chemo, splenectomy, organ transplant, " chronic steroids)    Negative: Patient sounds very sick or weak to the triager    Negative: [1] SEVERE headache (e.g., excruciating) AND [2] not improved after 2 hours of pain medicine    Negative: [1] Vomiting AND [2] 2 or more times (Exception: similar to previous migraines)    Negative: Fever > 104 F (40 C)    Negative: [1] MODERATE headache (e.g., interferes with normal activities) AND [2] present > 24 hours AND [3] unexplained  (Exceptions: analgesics not tried, typical migraine, or headache part of viral illness)    Negative: [1] New headache AND [2] weak immune system (e.g., HIV positive, cancer chemo, splenectomy, organ transplant, chronic steroids)    Negative: [1] New headache AND [2] age > 50    Negative: [1] Sinus pain of forehead AND [2] yellow or green nasal discharge    Negative: Fever present > 3 days (72 hours)    Negative: [1] MILD-MODERATE headache AND [2] present > 72 hours    Protocols used: VISION LOSS OR CHANGE-ADULT-, HEADACHE-ADULT-  Aysha COWAN RN [facility]

## 2019-04-15 ENCOUNTER — TELEPHONE (OUTPATIENT)
Dept: FAMILY MEDICINE | Facility: OTHER | Age: 29
End: 2019-04-15

## 2019-04-15 NOTE — TELEPHONE ENCOUNTER
Patient emailed indicating that she is no longer sure she would like a cochlear implant as she feels she gets benefit from her right hearing aid.    She would still like to meet with aMrtha Tariq MD as planned to discuss the sudden left hearing loss that occurred 5-6 years ago.  She would like imaging and would also like to discuss with Dr. Tariq possible referral for genetic testing.    After consultation with Dr. Tariq, patient was advised to cancel device selection appointment in Audiology if she does not plan to proceed with implantation.    Dr. Tariq will be notified of patient's plan for tomorrow's visit.       Michelle Romero, CCC-A  Licensed Audiologist  [place] [id number]

## 2019-04-15 NOTE — TELEPHONE ENCOUNTER
[employer]-  Albuterol medicare guidelines ---form placed in CDL Tray to sign/date fax back to [phone]

## 2019-04-16 ENCOUNTER — PRE VISIT (OUTPATIENT)
Dept: OTOLARYNGOLOGY | Facility: CLINIC | Age: 29
End: 2019-04-16

## 2019-04-16 LAB — COPATH REPORT: NORMAL

## 2019-04-17 ENCOUNTER — TELEPHONE (OUTPATIENT)
Dept: FAMILY MEDICINE | Facility: OTHER | Age: 29
End: 2019-04-17

## 2019-04-17 ENCOUNTER — OFFICE VISIT (OUTPATIENT)
Dept: FAMILY MEDICINE | Facility: OTHER | Age: 29
End: 2019-04-17
Payer: MEDICARE

## 2019-04-17 VITALS
BODY MASS INDEX: 34.63 KG/M2 | DIASTOLIC BLOOD PRESSURE: 84 MMHG | TEMPERATURE: 98.4 F | SYSTOLIC BLOOD PRESSURE: 126 MMHG | WEIGHT: 189.4 LBS | OXYGEN SATURATION: 96 % | RESPIRATION RATE: 16 BRPM | HEART RATE: 110 BPM

## 2019-04-17 DIAGNOSIS — M54.2 CERVICALGIA: ICD-10-CM

## 2019-04-17 DIAGNOSIS — M22.41 CHONDROMALACIA OF RIGHT PATELLOFEMORAL JOINT: ICD-10-CM

## 2019-04-17 DIAGNOSIS — J45.20 MILD INTERMITTENT ASTHMA WITHOUT COMPLICATION: ICD-10-CM

## 2019-04-17 DIAGNOSIS — M51.16 LUMBAR DISC DISEASE WITH RADICULOPATHY: ICD-10-CM

## 2019-04-17 DIAGNOSIS — M54.42 CHRONIC BILATERAL LOW BACK PAIN WITH LEFT-SIDED SCIATICA: Primary | ICD-10-CM

## 2019-04-17 DIAGNOSIS — G89.29 OTHER CHRONIC PAIN: ICD-10-CM

## 2019-04-17 DIAGNOSIS — M54.2 CERVICALGIA: Primary | ICD-10-CM

## 2019-04-17 DIAGNOSIS — Z98.1 S/P LUMBAR FUSION: ICD-10-CM

## 2019-04-17 DIAGNOSIS — G43.009 MIGRAINE WITHOUT AURA AND WITHOUT STATUS MIGRAINOSUS, NOT INTRACTABLE: ICD-10-CM

## 2019-04-17 DIAGNOSIS — G89.29 CHRONIC BILATERAL LOW BACK PAIN WITH LEFT-SIDED SCIATICA: Primary | ICD-10-CM

## 2019-04-17 PROCEDURE — 96372 THER/PROPH/DIAG INJ SC/IM: CPT | Performed by: PHYSICIAN ASSISTANT

## 2019-04-17 PROCEDURE — 99214 OFFICE O/P EST MOD 30 MIN: CPT | Mod: 25 | Performed by: PHYSICIAN ASSISTANT

## 2019-04-17 RX ORDER — HYDROCODONE BITARTRATE AND ACETAMINOPHEN 5; 325 MG/1; MG/1
1 TABLET ORAL EVERY 6 HOURS PRN
Qty: 28 TABLET | Refills: 0 | Status: SHIPPED | OUTPATIENT
Start: 2019-04-17 | End: 2019-05-13

## 2019-04-17 RX ORDER — KETOROLAC TROMETHAMINE 30 MG/ML
30 INJECTION, SOLUTION INTRAMUSCULAR; INTRAVENOUS ONCE
Status: COMPLETED | OUTPATIENT
Start: 2019-04-17 | End: 2019-04-17

## 2019-04-17 RX ORDER — DIPHENHYDRAMINE HCL 50 MG
50-100 CAPSULE ORAL EVERY 6 HOURS PRN
Qty: 90 CAPSULE | Refills: 3 | Status: SHIPPED | OUTPATIENT
Start: 2019-04-17 | End: 2019-11-25

## 2019-04-17 RX ADMIN — KETOROLAC TROMETHAMINE 30 MG: 30 INJECTION, SOLUTION INTRAMUSCULAR; INTRAVENOUS at 12:47

## 2019-04-17 ASSESSMENT — PAIN SCALES - GENERAL: PAINLEVEL: SEVERE PAIN (6)

## 2019-04-17 NOTE — PROGRESS NOTES
Prior to injection, verified patient identity using patient's name and date of birth.  Due to injection administration, patient instructed to remain in clinic for 15 minutes  afterwards, and to report any adverse reaction to me immediately.    Toradol    Drug Amount Wasted:  None.  Vial/Syringe: Single dose vial  Expiration Date:  01/21

## 2019-04-17 NOTE — TELEPHONE ENCOUNTER
Reason for Call: Request for an order or referral:    Order or referral being requested: new order for PT in [place] for her Right knee, neck and back    Date needed: as soon as possible    Has the patient been seen by the PCP for this problem? YES    Additional comments:     Phone number Patient can be reached at:  [phone]    Best Time:      Can we leave a detailed message on this number?  NO    Call taken on 4/17/2019 at 4:07 PM by Atiya Borrego

## 2019-04-18 ENCOUNTER — TELEPHONE (OUTPATIENT)
Dept: AUDIOLOGY | Facility: CLINIC | Age: 29
End: 2019-04-18

## 2019-04-18 ENCOUNTER — MYC MEDICAL ADVICE (OUTPATIENT)
Dept: FAMILY MEDICINE | Facility: OTHER | Age: 29
End: 2019-04-18

## 2019-04-18 NOTE — TELEPHONE ENCOUNTER
Email received from Katy, apologizing for missing her 4/16/19 appts (imaging and visit with Martha Tariq MD).      Patient reports she has decided not to proceed with cochlear implant so the 4/22/19 Audiology visit for device selection will be canceled.    She would like to reschedule the imaging and visit with Dr. Tariq to evaluate the left sudden loss that occurred 5-6 years ago.  She would also like a referral for genetic testing (will discuss at Dr. Tariq' clinic visit).  Dr. Tariq indicated patient could be rescheduled for imaging and could be scheduled for regular new patient visit (not new cochlear implant patient visit) since patient is no longer proceeding with cochlear implant.  Mikal Robison, , was notified and will reach out to patient.      Michelle Romero, CCC-A  Licensed Audiologist  [place] [id number]

## 2019-04-18 NOTE — TELEPHONE ENCOUNTER
Not able to switch controlled substances without appointment. She needs to try this for 2 weeks.     Zach Rosario PA-C  [facility]

## 2019-04-18 NOTE — TELEPHONE ENCOUNTER
Already has orders for low back. Added [facility] referral for neck and right knee.     Zach Rosario PA-C  [facility]

## 2019-04-19 ENCOUNTER — TRANSFERRED RECORDS (OUTPATIENT)
Dept: HEALTH INFORMATION MANAGEMENT | Facility: CLINIC | Age: 29
End: 2019-04-19

## 2019-04-22 ENCOUNTER — TELEPHONE (OUTPATIENT)
Dept: FAMILY MEDICINE | Facility: OTHER | Age: 29
End: 2019-04-22

## 2019-04-22 ENCOUNTER — DOCUMENTATION ONLY (OUTPATIENT)
Dept: FAMILY MEDICINE | Facility: OTHER | Age: 29
End: 2019-04-22

## 2019-04-22 ENCOUNTER — MYC MEDICAL ADVICE (OUTPATIENT)
Dept: FAMILY MEDICINE | Facility: OTHER | Age: 29
End: 2019-04-22

## 2019-04-22 ENCOUNTER — HOSPITAL ENCOUNTER (EMERGENCY)
Facility: CLINIC | Age: 29
Discharge: HOME | End: 2019-04-22
Attending: EMERGENCY MEDICINE | Admitting: EMERGENCY MEDICINE
Payer: MEDICARE

## 2019-04-22 ENCOUNTER — TELEPHONE (OUTPATIENT)
Dept: OTOLARYNGOLOGY | Facility: CLINIC | Age: 29
End: 2019-04-22

## 2019-04-22 VITALS
OXYGEN SATURATION: 99 % | TEMPERATURE: 98.3 F | HEART RATE: 71 BPM | BODY MASS INDEX: 34.56 KG/M2 | WEIGHT: 189 LBS | SYSTOLIC BLOOD PRESSURE: 133 MMHG | DIASTOLIC BLOOD PRESSURE: 103 MMHG | RESPIRATION RATE: 16 BRPM

## 2019-04-22 DIAGNOSIS — F17.200 TOBACCO USE DISORDER: Primary | ICD-10-CM

## 2019-04-22 DIAGNOSIS — M50.20 HNP (HERNIATED NUCLEUS PULPOSUS), CERVICAL: ICD-10-CM

## 2019-04-22 PROCEDURE — 99284 EMERGENCY DEPT VISIT MOD MDM: CPT | Mod: Z6 | Performed by: EMERGENCY MEDICINE

## 2019-04-22 PROCEDURE — 25000128 H RX IP 250 OP 636: Performed by: EMERGENCY MEDICINE

## 2019-04-22 PROCEDURE — 25000131 ZZH RX MED GY IP 250 OP 636 PS 637: Performed by: EMERGENCY MEDICINE

## 2019-04-22 PROCEDURE — 99285 EMERGENCY DEPT VISIT HI MDM: CPT | Performed by: EMERGENCY MEDICINE

## 2019-04-22 PROCEDURE — 25000132 ZZH RX MED GY IP 250 OP 250 PS 637: Performed by: EMERGENCY MEDICINE

## 2019-04-22 PROCEDURE — 96372 THER/PROPH/DIAG INJ SC/IM: CPT | Performed by: EMERGENCY MEDICINE

## 2019-04-22 RX ORDER — DIPHENHYDRAMINE HCL 25 MG
25 CAPSULE ORAL ONCE
Status: COMPLETED | OUTPATIENT
Start: 2019-04-22 | End: 2019-04-22

## 2019-04-22 RX ORDER — VARENICLINE TARTRATE 1 MG/1
1 TABLET, FILM COATED ORAL 2 TIMES DAILY
Qty: 60 TABLET | Refills: 3 | Status: SHIPPED | OUTPATIENT
Start: 2019-04-22 | End: 2019-06-25

## 2019-04-22 RX ORDER — ONDANSETRON 4 MG/1
4 TABLET, ORALLY DISINTEGRATING ORAL ONCE
Status: COMPLETED | OUTPATIENT
Start: 2019-04-22 | End: 2019-04-22

## 2019-04-22 RX ORDER — OXYCODONE HYDROCHLORIDE 5 MG/1
5 TABLET ORAL EVERY 8 HOURS PRN
Qty: 12 TABLET | Refills: 0 | Status: SHIPPED | OUTPATIENT
Start: 2019-04-22 | End: 2019-04-24

## 2019-04-22 RX ADMIN — ONDANSETRON 4 MG: 4 TABLET, ORALLY DISINTEGRATING ORAL at 14:01

## 2019-04-22 RX ADMIN — DIPHENHYDRAMINE HYDROCHLORIDE 25 MG: 25 CAPSULE ORAL at 13:47

## 2019-04-22 RX ADMIN — HYDROMORPHONE HYDROCHLORIDE 1 MG: 1 INJECTION, SOLUTION INTRAMUSCULAR; INTRAVENOUS; SUBCUTANEOUS at 13:48

## 2019-04-22 ASSESSMENT — ENCOUNTER SYMPTOMS
SHORTNESS OF BREATH: 0
FEVER: 0
ABDOMINAL PAIN: 0

## 2019-04-22 NOTE — TELEPHONE ENCOUNTER
Multigig message read with no response.  Will close encounter at this time.    Iggy Bernstein, RN, BSN

## 2019-04-22 NOTE — ED TRIAGE NOTES
Patient was shoveling out stables and started having neck pain two day ago. Was not able to sleep due to pain and vomiting last night. She did try taking her Norco at home.

## 2019-04-22 NOTE — PROGRESS NOTES
SUBJECTIVE:   Katy Islas is a 29 year old female who presents to clinic today for the following health issues:      HPI  Neck Pain  Onset: 3-4 months ago    Description:   Location: neck - c8  Radiation: nto the left shoulder, into the left forearm and into the left hand    Intensity: 5/10    Progression of Symptoms:  improving and constant    Accompanying Signs & Symptoms:  Burning, prickly sensation (paresthesias) in arm(s): no   Numbness in arm(s): YES  Weakness in arm(s):  YES  Fever: no   Headache: YES  Nausea and/or vomiting: YES    History:   Trauma: YES- thrown off a horse many time a few years ago  Previous neck pain: YES  Previous surgery or injections: YES: 1 injection yesterday - back surgery in October   Previous Imaging (MRI,X ray): YES    Precipitating factors:   Does movement increase the pain:  YES    Alleviating factors:  Ice  Therapies Tried and outcome:  Hot bath doesn't help Oxycodone helps     - Injection yesterday  - Did sleep a little bit for like 3 hours last night   - Sore today   - ED visit, gave 5 mg Oxycodone and did help some   - Just frustrated and fed up     - CT Myelogram on 5/14/19   - Starting on April 29th physical therapy     - GI doctor on May 6th, EGD follow up           Additional history: as documented      ROS:  Constitutional, HEENT, cardiovascular, pulmonary, gi and gu systems are negative, except as otherwise noted.    OBJECTIVE:   /78   Pulse 126   Temp 97.4  F (36.3  C) (Temporal)   Resp 16   Wt 82.6 kg (182 lb)   LMP  (LMP Unknown)   SpO2 96%   BMI 33.28 kg/m    Body mass index is 33.28 kg/m .  GENERAL APPEARANCE: healthy, alert and no distress  EYES: Eyes grossly normal to inspection, PERRLA, conjunctivae and sclerae without injection or discharge, EOM intact   Neck: Patient in neck brace   MS: No musculoskeletal defects are noted and gait is age appropriate without ataxia   SKIN: No suspicious lesions or rashes, hydration status appears adeuqate  with normal skin turgor   NEURO: Deferred   PSYCH: Alert and oriented x3; speech- coherent , normal rate and volume; able to articulate logical thoughts, able to abstract reason, no tangential thoughts, no hallucinations or delusions, mentation appears normal, Mood is euthymic. Affect is appropriate for this mood state and bright. Thought content is free of suicidal ideation, hallucinations, and delusions. Dress is adequate and upkept. Eye contact is good during conversation.       Diagnostic Test Results:  none     ASSESSMENT/PLAN:       ICD-10-CM    1. Chronic pain syndrome G89.4 Pain Drug Scr UR W Rptd Meds     oxyCODONE (ROXICODONE) 5 MG tablet     NEUROSURGERY REFERRAL   2. Bulge of cervical disc without myelopathy M50.20 oxyCODONE (ROXICODONE) 5 MG tablet     NEUROSURGERY REFERRAL   3. Chronic bilateral low back pain with left-sided sciatica M54.42 NEUROSURGERY REFERRAL    G89.29    4. Nausea and vomiting, intractability of vomiting not specified, unspecified vomiting type R11.2 ondansetron (ZOFRAN) injection 8 mg     prochlorperazine (COMPAZINE) 10 MG tablet   5. Gastroesophageal reflux disease without esophagitis K21.9 pantoprazole (PROTONIX) 40 MG EC tablet     1-3.   - Patient with complicated spinal history including L4-5 disectomy (10/19/11), L4-5 fusion (2/23/17), coccyx fracture and removal (complicated by infection - 10/18/17), and SI Joint fusion (10/26/18)   - Records received from [facility] - Dr. 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, improving with conservative therapy including physical therapy, injection (done yesterday 4/23/19), and bracing       Recommending thoracic and lumbar myelogram and post myelo CT, surgeon to follow up after this is complete for finalization of plan     - Failed on Norco, itching and not effective  - Was given Oxycodone by ED, this was helpful   - Patient is on medical marijuana for  mood/PTSD issues (following with counseling and psychiatry as well)   -  reviewed   - Will get Utox and CSA today      Discussed and signed CSA (4/24/19)       Reviewed risks of narcotic therapy including sedation, addiction, and constipation       Has had history of drug-seeking behavior but not for awhile, as above does have pathology       Will start with Oxycodone 5 mg, max 4/day        Short recheck in 1 week, discussed may decrease as injection hopefully starts to work     - Patient frustrated with care, will get second opinion with [facility]       Encouraged her to still keep appointment for CT myelogram on 5/14/19       Will get opinion for after this     - Encouraged her to keep scheduled physical therapy which will start on 4/29/19     4 & 5.   - Complicated GI history, including Crohn's, GERD, history of gastric and esophageal ulcers   - Patient has appointment for recheck with  GI 5/6/19, requesting injection for nausea and something different than Zofran for nausea   - Will give Zofran injection, reviewed use and side effects  - Will do trial of Compazine, reviewed use and side effects    The patient indicates understanding of these issues and agrees with the plan.    Follow up: 1 week     Due to language barrier, an  was present during the history-taking and subsequent discussion (and for part of the physical exam) with this patient.        Aura Rosario PA-C  [facility]

## 2019-04-22 NOTE — DISCHARGE INSTRUCTIONS
Severe neck pain is due to the known herniated disc.  Recommend stopping hydrocodone.  Oxycodone 5 mg tablet every 8 hours as needed for severe pain.  Before taking premedicated with Zofran 4 mg and Benadryl 25 mg.  Keep scheduled appointment with your primary care provider for Wednesday.

## 2019-04-22 NOTE — TELEPHONE ENCOUNTER
Can not work in today. Limited things I can do in clinic for acute pain like this. Recommend ED or to call her neck surgeon.     Zach Rosario PA-C  [facility]

## 2019-04-22 NOTE — ED AVS SNAPSHOT
[facility] Emergency Department  [address] [place] [address]  Phone:  [phone]  Fax:  [phone]                                    Katy Islas   MRN: [id number]    Department:  [facility] Emergency Department   Date of Visit:  4/22/2019           After Visit Summary Signature Page    I have received my discharge instructions, and my questions have been answered. I have discussed any challenges I see with this plan with the nurse or doctor.    ..........................................................................................................................................  Patient/Patient Representative Signature      ..........................................................................................................................................  Patient Representative Print Name and Relationship to Patient    ..................................................               ................................................  Date                                   Time    ..........................................................................................................................................  Reviewed by Signature/Title    ...................................................              ..............................................  Date                                               Time          22EPIC Rev 08/18

## 2019-04-22 NOTE — TELEPHONE ENCOUNTER
Katy Islas is a 29 year old female who calls with headache and neck pain.    NURSING ASSESSMENT:  Description:  Spoke with patient vis : Has a headache and neck pain for a few days.   Noticed that it's warm and numb on the left side. Moving jaw ok.   Throbbing and sharp pain at times. Worse part is left side forehead and down to neck and hurts to turn head.   Does have herniated disks as well  Has tried tylenol with little improvment  Norco was making her sick and vomiting.    No vision changes.  Pain scale (0-10)7-8/10    Allergies:   Allergies   Allergen Reactions     Ativan [Lorazepam] Hives     At the IV site     Baclofen      hives     Bees Hives, Swelling and Difficulty breathing     Caffeine      Contrast Dye      Hives,   Updated 5/10/2016 CT Contrast.     Iodine Hives     Methocarbamol Swelling     Metoclopramide      Other reaction(s): Tremors  LW Reaction: shaking/sweating     Midazolam      Other reaction(s): Agitation     Monosodium Glutamate      Morphine Other (See Comments)     Difficulty with urination     Nsaids Other (See Comments)     GI BLEED x2     Other (Do Not Use) Other (See Comments)     Xanaflex- pt becomes disoriented and loses bladder control     Reglan [Metoclopramide Hcl] Other (See Comments)     shaking     Soma [Carisoprodol] Visual Disturbance     Sleep walking     Tizanidine      Topamax Other (See Comments)     Topamax [Topiramate] Nausea     Tingling  GI/Vomit     Tramadol      Severe Headache, Seizure Risk     Tylenol W/Codeine [Acetaminophen-Codeine] Nausea and Itching     Tylenol 3     Valium Other (See Comments)     Becomes aggressive and angry     Versed Other (See Comments)     Zolmitriptan      Makes face feel like its twitching     Droperidol Anxiety     Flu Virus Vaccine Rash      Arm swelling       MEDICATIONS:   Taking medication(s) as prescribed? Yes  Taking over the counter medication(s?) Yes  Any medication side effects? No significant side effects     Any barriers to taking medication(s) as prescribed?  No  Medication(s) improving/managing symptoms?  No  Medication reconciliation completed: Yes      NURSING PLAN: Routed to provider Yes patient would like to be seen today.  Are you able to work her in?    RECOMMENDED DISPOSITION:  See in 24 hours -   Will comply with recommendation: Yes  If further questions/concerns or if symptoms do not improve, worsen or new symptoms develop, call your PCP or [facility] as soon as possible.      Guideline used:  Telephone Triage Protocols for Nurses, Fifth Edition, EDYTA JohnsonN

## 2019-04-22 NOTE — TELEPHONE ENCOUNTER
Reason for Call:  Same Day Appointment, Requested Provider:  ZAYNAB Daley     PCP: Aura Rosario    Reason for visit: f/u neck pain not better    Duration of symptoms: ?    Have you been treated for this in the past? Yes    Additional comments: patient is wanting to be seen today.    Can we leave a detailed message on this number? NO    Phone number patient can be reached at: Home number on file [phone] (home)    Best Time: anytime    Call taken on 4/22/2019 at 11:26 AM by Nani Brito

## 2019-04-22 NOTE — TELEPHONE ENCOUNTER
Reason for Call:  Same Day Appointment, Requested Provider:  ZAYNAB Daley     PCP: Aura Rosario    Reason for visit:  Neck pain and headache.  She would like toradol injection.     Duration of symptoms:  2 days    Have you been treated for this in the past? Yes    Additional comments:  Patient is calling to see if Zach Rosario will work her in today?      Can we leave a detailed message on this number? YES    Phone number patient can be reached at: Home number on file [phone] (home)    Best Time: any    Call taken on 4/22/2019 at 7:23 AM by Damaris Monroe

## 2019-04-22 NOTE — PROGRESS NOTES
I received records from [facility]. Confirming appointment on 4/9/19 with Dr. YOVANI Eugene. Will send to scanning.    States   - C6/7 bulge, improving with conservative therapy including physical therapy, injection, and bracing   - Recommending thoracic and lumbar myelogram and post myelo CT, surgeon to follow up after this is complete for finalization of plan       Zach Rosario PA-C  [facility]

## 2019-04-22 NOTE — ED PROVIDER NOTES
History     Chief Complaint   Patient presents with     Neck Pain     HPI  Katy Islas is a 29 year old female who presents with intense neck pain.  Patient reports she is under the care of a spine specialist along with a primary care provider for this complaint.  Self reports that she had a MRI cervical spine at [facility] approximate 3 weeks old and confirmed a herniated disc at c8.  Is not tolerating hydrocodone at home.  Causing her to vomit.  States she has an appointment primary care doctor in Wednesday.  Is starting physical therapy.  Spine specialist gave her an Aspen collar to wear which she is currently wearing when she presented to the emergency department.  Rates her neck pain 9/10 intensity.  Also complains of itching from narcotics.    Current medication list reviewed    Allergies:  Allergies   Allergen Reactions     Ativan [Lorazepam] Hives     At the IV site     Baclofen      hives     Bees Hives, Swelling and Difficulty breathing     Caffeine      Contrast Dye      Hives,   Updated 5/10/2016 CT Contrast.     Iodine Hives     Methocarbamol Swelling     Metoclopramide      Other reaction(s): Tremors  LW Reaction: shaking/sweating     Midazolam      Other reaction(s): Agitation     Monosodium Glutamate      Morphine Other (See Comments)     Difficulty with urination     Nsaids Other (See Comments)     GI BLEED x2     Other (Do Not Use) Other (See Comments)     Xanaflex- pt becomes disoriented and loses bladder control     Reglan [Metoclopramide Hcl] Other (See Comments)     shaking     Soma [Carisoprodol] Visual Disturbance     Sleep walking     Tizanidine      Topamax Other (See Comments)     Topamax [Topiramate] Nausea     Tingling  GI/Vomit     Tramadol      Severe Headache, Seizure Risk     Tylenol W/Codeine [Acetaminophen-Codeine] Nausea and Itching     Tylenol 3     Valium Other (See Comments)     Becomes aggressive and angry     Versed Other (See Comments)     Zolmitriptan      Makes face feel  like its twitching     Droperidol Anxiety     Flu Virus Vaccine Rash      Arm swelling       Problem List:    Patient Active Problem List    Diagnosis Date Noted     Cervicalgia 04/17/2019     Priority: Medium     Class 1 obesity due to excess calories without serious comorbidity with body mass index (BMI) of 32.0 to 32.9 in adult 04/03/2019     Priority: Medium     Hypophosphatemia 11/01/2018     Priority: Medium     Patellar maltracking, right 09/05/2018     Priority: Medium     Right anterior knee pain 09/05/2018     Priority: Medium     Melanocytic nevus, unspecified location 07/23/2018     Priority: Medium     Dysplastic skin lesion 07/23/2018     Priority: Medium     Iliotibial band syndrome affecting lower leg, right 12/21/2017     Priority: Medium     Chondromalacia of right patellofemoral joint 12/21/2017     Priority: Medium     Postoperative pain 11/13/2017     Priority: Medium     Closed fracture of coccyx with nonunion, subsequent encounter 10/12/2017     Priority: Medium     Upper GI bleed - suspected 07/01/2017     Priority: Medium     Tobacco use disorder 07/01/2017     Priority: Medium     History of pseudoseizure 07/01/2017     Priority: Medium     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) 03/10/2017     Priority: Medium     Lumbar disc disease with radiculopathy 02/23/2017     Priority: Medium     S/P lumbar fusion 02/23/2017     Priority: Medium     Dr. YOVANI Millan, 2/23/17       Vitamin D deficiency 01/06/2017     Priority: Medium     Atypical mole 01/06/2017     Priority: Medium     Mild persistent asthma without complication 12/02/2016     Priority: Medium     Chronic bilateral low back pain with left-sided sciatica 12/02/2016     Priority: Medium     Bee sting allergy 09/16/2016     Priority: Medium     Gastroesophageal reflux disease without esophagitis 08/26/2016     Priority: Medium     Herpes simplex virus infection 11/12/2015     Priority: Medium     Adjustment disorder with mixed  anxiety and depressed mood 10/14/2015     Priority: Medium     Marijuana abuse 02/22/2015     Priority: Medium     Bipolar disorder (H) 01/31/2013     Priority: Medium     Problem list name updated by automated process. Provider to review       Nausea and vomiting 12/13/2012     Priority: Medium     Oral thrush 06/25/2012     Priority: Medium     Chronic pain 02/09/2012     Priority: Medium     Patient is followed by Aura Rosario PA-C for ongoing prescription of pain medication.  All refills should only be approved by this provider, or covering partner.    Medication(s): Norco    Maximum quantity per month: 70  Clinic visit frequency required: Q 2 months     Controlled substance agreement:   Discussed and signed 4/25/17    Encounter-Level CSA - 01/12/2015:                 Controlled Substance Agreement - Scan on 1/26/2015  9:14 AM : Controlled Medication Agreement-01/12/15 (below)            Pain Clinic evaluation in the past: Yes       Date/Location:   [facility], fall 2016    DIRE Total Score(s):    4/25/2017   Total Score 17       Last [facility] website verification:  done on 4/25/17 by LOVE Maki   [email or web address]           Anxiety 09/22/2011     Priority: Medium     Mild major depression (H) 08/29/2011     Priority: Medium     Mild intermittent asthma 10/12/2009     Priority: Medium     Overview:   Formatting of this note might be different from the original.  Action plan: See letter 4/10/2013   ATAQ:   Asthma Data 4/10/2013   Date completed 4/10/2013   Missed daily activities no = 0   Wake at night no = 0   Believe asthma in control yes = 0   ARIN use yes   Maximum ARIN use in 1 day 1-4 = 0   ATAQ score 0 = well controlled   Asthma ED visits in past 12 mos 0   Asthma hospitalizations in past 12 mos 0     Current Outpatient Prescriptions   Medication Sig     albuterol (PROVENTIL) 0.083 % neb solution Inhale 3 mL via a nebulizer every 4 hours if needed for Shortness Of  Breath.        Crohn's colitis (H) 08/04/2009     Priority: Medium     Dysmenorrhea 05/11/2007     Priority: Medium     Benign neoplasm of skin of lower limb, including hip 03/16/2004     Priority: Medium     Migraine      Priority: Medium     Dr. Farrar - Neurology.  Now on Inderal.  Seems to be working.  Follow-up 9/08  Problem list name updated by automated process. Provider to review       Hearing loss      Priority: Medium     Congenital  Problem list name updated by automated process. Provider to review       Allergic rhinitis      Priority: Medium     Problem list name updated by automated process. Provider to review          Past Medical History:    Past Medical History:   Diagnosis Date     Abdominal pain 10/31- 11/4/2005     Allergic rhinitis, cause unspecified      Arthritis      C. 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.     FUSION SPINE POSTERIOR MINIMALLY INVASIVE ONE LEVEL N/A 2/23/2017    Procedure: FUSION SPINE POSTERIOR MINIMALLY INVASIVE ONE LEVEL;  L4-5 Oblique Lateral Lumbar Interbody Fusion   Epidural steroid injection.   Transpedicular Bone marrow aspiration;  Surgeon: Jeniffer Eugene MD;  Location:  OR      HC COLONOSCOPY THRU STOMA WITH BIOPSY  10/29/2003    Impression is that of normal appearing colonoscopy, without evidence of rectal bleeding.     HC COLONOSCOPY THRU STOMA, DIAGNOSTIC  10/00    normal     HC COLONOSCOPY THRU STOMA, DIAGNOSTIC  Oct 2009    Dr López- normal     HC EEG AWAKE AND SLEEP      abnormal     HC MRI BRAIN W/O CONTRAST  12/00    normal     HC REMOVAL GALLBLADDER  8/5/2009    [facility] [place]     HC UGI ENDOSCOPY DIAG W BIOPSY  11/11/09    Normal esophagus     HC UGI ENDOSCOPY, SIMPLE EXAM  7/00, 10/00    mild chronic esophagitis and duodenitis, neg H pylori     HC UGI ENDOSCOPY, SIMPLE EXAM  01/20/2005    Esophagogastroduodenoscopy, colonoscopy with biopsies.  [facility] UGI ENDOSCOPY, SIMPLE EXAM  7/1/2009    [facility].      UGI ENDOSCOPY, SIMPLE EXAM  11/11/2009    attempted upper GI, pt. could not tolerate procedure:[facility]     ORTHOPEDIC SURGERY  October 19,2011    diskectomy L4-L5       Family History:    Family History   Problem Relation Age of Onset     Gastrointestinal Disease Brother         severe Crohn's     Neurologic Disorder Brother         Seizures post head injury     Depression Brother      Substance Abuse Brother      Genitourinary Problems Father         kidney stones     Diabetes Father      Heart Disease Father         Open heart surgery     Breast Cancer Maternal Grandmother      Parkinsonism Maternal Grandmother      Cerebrovascular Disease Paternal Grandmother      Cancer Maternal Grandfather         Lung     Cardiovascular Paternal Grandfather         Heart Attack     Substance Abuse Mother      Lung Cancer Paternal Uncle      Cancer Paternal Uncle      Lung Cancer Maternal Uncle        Social History:  Marital Status:  Single [1]  Social History     Tobacco Use     Smoking status: Current Every Day Smoker     Packs/day: 0.50     Years: 5.00     Pack years: 2.50     Types: Cigarettes      Smokeless tobacco: Never Used   Substance Use Topics     Alcohol use: Not Currently     Comment: Not since last July     Drug use: Yes     Types: Marijuana     Comment: Medical Marijuana currently        Medications:      oxyCODONE (ROXICODONE) 5 MG tablet   acetaminophen (TYLENOL) 500 MG tablet   albuterol (2.5 MG/3ML) 0.083% neb solution   albuterol (PROAIR HFA/PROVENTIL HFA/VENTOLIN HFA) 108 (90 Base) MCG/ACT Inhaler   ARIPiprazole (ABILIFY) 15 MG tablet   busPIRone (BUSPAR) 15 MG tablet   clonazePAM (KLONOPIN) 0.5 MG tablet   cyclobenzaprine (FLEXERIL) 10 MG tablet   dicyclomine (BENTYL) 20 MG tablet   diphenhydrAMINE (BENADRYL) 50 MG capsule   DULoxetine (CYMBALTA) 60 MG EC capsule   EPINEPHrine (EPIPEN/ADRENACLICK/OR ANY BX GENERIC EQUIV) 0.3 MG/0.3ML injection 2-pack   gabapentin (NEURONTIN) 300 MG capsule   levocetirizine (XYZAL) 5 MG tablet   medical cannabis inhalation (Patient's own supply.  Not a prescription)   medroxyPROGESTERone (DEPO-PROVERA) 150 MG/ML IM injection   NONFORMULARY   nystatin (MYCOSTATIN) cream   ondansetron (ZOFRAN ODT) 4 MG ODT tab   pantoprazole (PROTONIX) 40 MG EC tablet   prazosin (MINIPRESS) 1 MG capsule   rizatriptan (MAXALT) 5 MG tablet   rizatriptan (MAXALT-MLT) 5 MG ODT tab   sucralfate (CARAFATE) 1 GM tablet   varenicline (CHANTIX) 1 MG tablet         Review of Systems   Constitutional: Negative for fever.   Respiratory: Negative for shortness of breath.    Cardiovascular: Negative for chest pain.   Gastrointestinal: Negative for abdominal pain.   All other systems reviewed and are negative.      Physical Exam   BP: (!) 153/120  Pulse: 94  Temp: 98.3  F (36.8  C)  Resp: 16  Weight: 85.7 kg (189 lb)  SpO2: 99 %      Physical Exam   Constitutional: She appears distressed.   HENT:   Head: Normocephalic and atraumatic.   Aspen c-collar in place   Eyes: Pupils are equal, round, and reactive to light. EOM are normal.   Neck:   Palpating cervical spine did not reveal any midline  pain over cervical or upper thoracic vertebrae.  No noted paracervical soft tissue hypertonicity or spasm.   Neurological:   Both upper extremities were normal for muscle tone, bulk, strength, reflexes, sensation.   Skin: Capillary refill takes less than 2 seconds.   Psychiatric:   Anxious       ED Course        Procedures                 No results found. However, due to the size of the patient record, not all encounters were searched. Please check Results Review for a complete set of results.    Medications   HYDROmorphone (DILAUDID) injection 1 mg (1 mg Intramuscular Given 4/22/19 1348)   diphenhydrAMINE (BENADRYL) capsule 25 mg (25 mg Oral Given 4/22/19 1347)   ondansetron (ZOFRAN-ODT) ODT tab 4 mg (4 mg Oral Given 4/22/19 1401)       Assessments & Plan (with Medical Decision Making)  29-year-old female presents with worsening of her acute neck pain.  Self-reports MRI imaging completed at [facility] 3 weeks L confirmed cervical HNP.  She did present with wearing an Aspen C collar given to her by her spine specialist.  She is starting physical therapy.  Not tolerating hydrocodone due to vomiting.  Not having any acute neurologic symptoms affecting upper and lower extremities.  Here for pain management states she is scheduled to see her primary doctor in Wednesday. No follow-up with her spine specialist at this time.  Plan: pain control- stop norco  Start oxycodone 5 mg dose q8 PRN #12 dispensed. Benadryl and zofran for narcotic side effects; would consider increasing Neurontin.     I have reviewed the nursing notes.    I have reviewed the findings, diagnosis, plan and need for follow up with the patient.       Medication List      Started    oxyCODONE 5 MG tablet  Commonly known as:  ROXICODONE  5 mg, Oral, EVERY 8 HOURS PRN            Final diagnoses:   HNP (herniated nucleus pulposus), cervical       4/22/2019   [facility] EMERGENCY DEPARTMENT     Stnoe Hillman,   04/22/19 [id number]

## 2019-04-23 ENCOUNTER — PATIENT OUTREACH (OUTPATIENT)
Dept: CARE COORDINATION | Facility: CLINIC | Age: 29
End: 2019-04-23

## 2019-04-23 ASSESSMENT — ACTIVITIES OF DAILY LIVING (ADL): DEPENDENT_IADLS:: INDEPENDENT

## 2019-04-23 NOTE — PROGRESS NOTES
Clinic Care Coordination Contact    Clinic Care Coordination Contact  OUTREACH    Referral Information:  Referral Source: ED Follow-Up    Primary Diagnosis: Neurological Disorders(Neck pain)    Chief Complaint   Patient presents with     ER F/U     Nurse: ER f/u 4/22/19 neck pain        Universal Utilization: Pt has had 37 ED/ 2 Hospital visits in past 90 days;  Pt did trigger high for risk of readmission-ED/hospital utilization.  Clinic Utilization  Difficulty keeping appointments:: Yes  Compliance Concerns: No  No-Show Concerns: Yes  No PCP office visit in Past Year: No  Utilization    Last refreshed: 4/23/2019  7:56 AM:  Hospital Admissions 0           Last refreshed: 4/23/2019  7:56 AM:  ED Visits 10           Last refreshed: 4/23/2019  7:56 AM:  No Show Count (past year) 16              Current as of: 4/23/2019  7:56 AM              Clinical Concerns:  Care Coordinator RN spoke with patient thru . Patient reports being little bit better today. She states she slept last night with ice on her neck, woke up around 4 am took pain medication. She states she has appointment today with neck/back doctor for injection and OV with PCP tomorrow. She did state she has concerns for the medical bills. She states she only has Medicare and ends up paying for whatever Medicare won't cover. She is interested in talking with CHRISTIANNE PALENCIA about financial concerns for her clinic and hospital bills. She denies having further questions or concerns for Care Coordinator RN.     Current Medical Concerns:    Patient Active Problem List   Diagnosis     Hearing loss     Allergic rhinitis     Migraine     Benign neoplasm of skin of lower limb, including hip     Dysmenorrhea     Crohn's colitis (H)     Mild major depression (H)     Anxiety     Chronic pain     Oral thrush     Nausea and vomiting     Bipolar disorder (H)     Marijuana abuse     Adjustment disorder with mixed anxiety and depressed mood     Herpes simplex virus  infection     Gastroesophageal reflux disease without esophagitis     Bee sting allergy     Mild persistent asthma without complication     Chronic bilateral low back pain with left-sided sciatica     Vitamin D deficiency     Atypical mole     Lumbar disc disease with radiculopathy     S/P lumbar fusion     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)     Upper GI bleed - suspected     Tobacco use disorder     History of pseudoseizure     Closed fracture of coccyx with nonunion, subsequent encounter     Postoperative pain     Iliotibial band syndrome affecting lower leg, right     Chondromalacia of right patellofemoral joint     Melanocytic nevus, unspecified location     Dysplastic skin lesion     Patellar maltracking, right     Right anterior knee pain     Hypophosphatemia     Class 1 obesity due to excess calories without serious comorbidity with body mass index (BMI) of 32.0 to 32.9 in adult     Mild intermittent asthma     Cervicalgia         Current Behavioral Concerns: Denies having any concerns at this time.     Education Provided to patient: RN CC educated about Care Coordination Services, discharge instructions,  and follow up.         Health Maintenance Reviewed: Due/Overdue   Health Maintenance Due   Topic Date Due     HIV SCREEN (SYSTEM ASSIGNED)  03/18/2007     ASTHMA CONTROL TEST Q6 MOS  02/10/2019       Clinical Pathway: None    Medication Management:  Patient independent in medication management and verbalizes adherence and understanding of medication regimen.        Functional Status:  Dependent ADLs:: Independent  Dependent IADLs:: Independent  Bed or wheelchair confined:: No  Mobility Status: Independent  Fallen 2 or more times in the past year?: No  Any fall with injury in the past year?: No    Living Situation:  Current living arrangement:: Not Assessed    Diet/Exercise/Sleep:  Diet:: Regular  Inadequate nutrition (GOAL):: No  Food Insecurity: No  Tube Feeding: No  Exercise:: Currently  not exercising  Inadequate activity/exercise (GOAL):: No  Significant changes in sleep pattern (GOAL): No    Transportation:  Transportation concerns (GOAL):: No  Transportation means:: Regular car     Psychosocial:  Mental health DX:: Yes  Mental health DX how managed:: Medication     Financial/Insurance:   Financial/Insurance concerns (GOAL):: Yes  [facility] referral made due to financial concerns about clinic and hospital bills.     Resources and Interventions:  Current Resources: n/a  Community Resources: County Worker  Supplies used at home:: Nebulizer tubing  Equipment Currently Used at Home: none    Advance Care Plan/Directive  Advanced Care Plans/Directives on file:: No    Referrals Placed: Other ([facility])       Future Appointments              Tomorrow Aura Rosario PA-C; [facility] [place], [facility]    In 6 days Dayana Roldan; Darline Rizvi, PT [facility], [facility]    In 1 week Dayana Roldan; Aura Rosario PA-C [facility], [place]    In 1 week [place], Oneyda, PT [facility], [facility]    In 1 week Junaid Pritchett PA-C [facility]    In 2 weeks Darline Rizvi, PT [facility], [facility]    In 2 weeks Kacy, Oneyda, PT [facility], [facility]    In 2 weeks [place], Oneyda, PT [facility]    In 3 weeks [place], Oneyda, PT [facility], [facility]    In 3 weeks [place], Oneyda, PT; NL POOL [facility], [facility]    In 4 weeks [place], Oneyda, PT; NL POOL [facility], [facility]    In 1 month Kacy, Oneyda, PT; NL POOL [facility], [facility]    In 1 month Kacy, Oneyda, PT; NL POOL [facility]          Plan:   Patient will continue to  follow treatment plan as directed and follow up with PCP with concerns ongoing.   Patient to f/u with back/neck provider today.  Patient to f/u with PCP tomorrow as scheduled.  Care Coordinator RN to send to CHRISTIANNE  to offer assistance for financial concerns.  No further Care Coordinator RN concerns at this time. No follow up planned. Please refer again in the future.    Tanika ERICKSON, RN, PHN, [facility]  Primary Care Clinic RN Care Coordinator  [facility]   [email or web address]  Office:  [phone]

## 2019-04-24 ENCOUNTER — TELEPHONE (OUTPATIENT)
Dept: FAMILY MEDICINE | Facility: OTHER | Age: 29
End: 2019-04-24

## 2019-04-24 ENCOUNTER — OFFICE VISIT (OUTPATIENT)
Dept: FAMILY MEDICINE | Facility: OTHER | Age: 29
End: 2019-04-24
Payer: MEDICARE

## 2019-04-24 ENCOUNTER — MYC MEDICAL ADVICE (OUTPATIENT)
Dept: FAMILY MEDICINE | Facility: OTHER | Age: 29
End: 2019-04-24

## 2019-04-24 VITALS
RESPIRATION RATE: 16 BRPM | OXYGEN SATURATION: 96 % | HEART RATE: 126 BPM | TEMPERATURE: 97.4 F | DIASTOLIC BLOOD PRESSURE: 78 MMHG | BODY MASS INDEX: 33.28 KG/M2 | WEIGHT: 182 LBS | SYSTOLIC BLOOD PRESSURE: 124 MMHG

## 2019-04-24 DIAGNOSIS — M54.42 CHRONIC BILATERAL LOW BACK PAIN WITH LEFT-SIDED SCIATICA: ICD-10-CM

## 2019-04-24 DIAGNOSIS — M50.30 BULGE OF CERVICAL DISC WITHOUT MYELOPATHY: ICD-10-CM

## 2019-04-24 DIAGNOSIS — K21.9 GASTROESOPHAGEAL REFLUX DISEASE WITHOUT ESOPHAGITIS: ICD-10-CM

## 2019-04-24 DIAGNOSIS — R11.2 NAUSEA AND VOMITING, INTRACTABILITY OF VOMITING NOT SPECIFIED, UNSPECIFIED VOMITING TYPE: ICD-10-CM

## 2019-04-24 DIAGNOSIS — G89.29 CHRONIC BILATERAL LOW BACK PAIN WITH LEFT-SIDED SCIATICA: ICD-10-CM

## 2019-04-24 DIAGNOSIS — G89.4 CHRONIC PAIN SYNDROME: Primary | ICD-10-CM

## 2019-04-24 PROCEDURE — 99214 OFFICE O/P EST MOD 30 MIN: CPT | Mod: 25 | Performed by: PHYSICIAN ASSISTANT

## 2019-04-24 PROCEDURE — 96372 THER/PROPH/DIAG INJ SC/IM: CPT | Performed by: PHYSICIAN ASSISTANT

## 2019-04-24 RX ORDER — PANTOPRAZOLE SODIUM 40 MG/1
40 TABLET, DELAYED RELEASE ORAL 2 TIMES DAILY
Qty: 180 TABLET | Refills: 3 | Status: SHIPPED | OUTPATIENT
Start: 2019-04-24 | End: 2019-12-18

## 2019-04-24 RX ORDER — OXYCODONE HYDROCHLORIDE 5 MG/1
5 TABLET ORAL EVERY 8 HOURS PRN
Qty: 30 TABLET | Refills: 0 | Status: SHIPPED | OUTPATIENT
Start: 2019-04-24 | End: 2019-04-29

## 2019-04-24 RX ORDER — ONDANSETRON 2 MG/ML
8 INJECTION INTRAMUSCULAR; INTRAVENOUS ONCE
Status: COMPLETED | OUTPATIENT
Start: 2019-04-24 | End: 2019-04-24

## 2019-04-24 RX ORDER — PROCHLORPERAZINE MALEATE 10 MG
10 TABLET ORAL EVERY 6 HOURS PRN
Qty: 60 TABLET | Refills: 3 | Status: SHIPPED | OUTPATIENT
Start: 2019-04-24 | End: 2019-06-29

## 2019-04-24 RX ADMIN — ONDANSETRON 8 MG: 2 INJECTION INTRAMUSCULAR; INTRAVENOUS at 09:12

## 2019-04-24 ASSESSMENT — PAIN SCALES - GENERAL: PAINLEVEL: MODERATE PAIN (5)

## 2019-04-24 NOTE — TELEPHONE ENCOUNTER
Team    Please schedule patient for consult with Neurosurgery here in [place] for Neck and low back pain after 5/14/19. She will take any appointment as long as doesn't interfere with appointments scheduled (physical therapy).     When scheduled, please send patient a [employer] message with information (patient is deaf)     Zach Rosario PA-C  [facility]

## 2019-04-24 NOTE — TELEPHONE ENCOUNTER
Prior Authorization Retail Medication Request    Medication/Dose: rizatriptan ODT (MAXALT) 5 MG tablet  ICD code (if different than what is on RX):  Previously Tried and Failed:    Rationale:      Insurance Name:    Insurance ID:        Pharmacy Information (if different than what is on RX)  Name:  [facility]  Phone:  [phone]

## 2019-04-24 NOTE — LETTER
[facility]  04/24/19    Patient: Katy Islas  YOB: 1990  Medical Record Number: [id number]                                                                  Opioid / Opioid Plus Controlled Substance Agreement    I understand that my care provider has prescribed an opioid (narcotic) controlled substance to help manage my condition(s). I am taking this medicine to help me function or work. I know this is strong medicine, and that it can cause serious side effects. Opioid medicine can be sedating, addicting and may cause a dependency on the drug. They can affect my ability to drive or think, and cause depression. They need to be taken exactly as prescribed. Combining opioids with certain medicines or chemicals (such as cocaine, sedatives and tranquilizers, sleeping pills, meth) can be dangerous or even fatal. Also, if I stop opioids suddenly, I may have severe withdrawal symptoms. Last, I understand that opioids do not work for all types of pain nor for all patients. If not helpful, I may be asked to stop them.    I am also being prescribed a benzodiazepine (tranquilizer) controlled substance.   I understand this type of medication is sedating, and can increase the risk of death when taken together with opioids.  I have talked to my care team about the option of having a prescription for Narcan to use to reverse the opioid medicine, in case I get too sleepy. I will be very careful to take my medicines only as directed.    The risks, benefits, and side effects of these medicine(s) were explained to me. I agree that:    1. I will take part in other treatments as advised by my care team. This may be psychiatry or counseling, physical therapy, behavioral therapy, group treatment or a referral to a pain clinic. I will reduce or stop my medicine when my care team tells me to do so.  2. I will take my medicines as prescribed. I will not change the dose or schedule unless my care team tells me  to. There will be no refills if I  run out early.   I may be contactedwithout warning and asked to complete a urine drug test or pill count at any time.   3. I will keep all my appointments, and understand this is part of the monitoring of opioids. My care team may require an office visit for EVERY opioid/controlled substance refill. If I miss appointments or don t follow instructions, my care team may stop my medicine.  4. I will not ask other providers to prescribe controlled substances, and I will not accept controlled substances from other people. If I need another prescribed controlled substance for a new reason, I will tell my care team within 1 business day.  5. I will use one pharmacy to fill all of my controlled substance prescriptions, and it is up to me to make sure that I do not run out of my medicines on weekends or holidays. If my care team is willing to refill my opioid prescription without a visit, I must request refills only during office hours, refills may take up to 3 days to process, and it may take up to 5 to 7 days for my medicine to be mailed and ready at my pharmacy. Prescriptions will not be mailed anywhere except my pharmacy.        [id number]  Rev 12/18         Registration to scan to EHR                             Page 1 of 2               Controlled Substance Agreement Opioid        [facility]  04/24/19  Patient: Katy Islas  YOB: 1990  Medical Record Number: [id number]                                                                  6. I am responsible for my prescriptions. If the medicine/prescription is lost or stolen, it will not be replaced. I also agree not to share controlled substance medicines with anyone.  7. I agree to not use ANY illegal or recreational drugs. This includes marijuana, cocaine, bath salts or other drugs. I agree not to use alcohol unless my care team says I may.          I agree to give urine samples whenever asked. If I don t  give a urine sample, the care team may stop my medicine.    8. If I enroll in the [place] Medical Marijuana program, I will tell my care team. I will also sign an agreement to share my medical records with my care team.   9. I will bring in my list of medicines (or my medicine bottles) each time I come to the clinic.   10. I will tell my care team right away if I become pregnant or have a new medical problem treated outside of my regular clinic.  11. I understand that this medicine can affect my thinking and judgment. It may be unsafe for me to drive, use machinery and do dangerous tasks. I will not do any of these things until I know how the medicine affects me. If my dose changes, I will wait to see how it affects me. I will contact my care team if I have concerns about medicine side effects.    I understand that if I do not follow any of the conditions above, my prescriptions or treatment may be stopped.      I agree that my provider, clinic care team, and pharmacy may work with any city, state or federal law enforcement agency that investigates the misuse, sale, or other diversion of my controlled medicine. I will allow my provider to discuss my care with or share a copy of this agreement with any other treating provider, pharmacy or emergency room where I receive care. I agree to give up (waive) any right of privacy or confidentiality with respect to these consents.     I have read this agreement and have asked questions about anything I did not understand.      ________________________________________________________________________  Patient signature - Date/Time -  Katy Islas                                      ________________________________________________________________________  Provider signature - Aura Rosario PA-C                [id number]  Rev 12/18         Registration to scan to EHR                         Page 2 of 2                   Controlled Substance Agreement  Opioid           Page 1 of 2  Opioid Pain Medicines (also known as Narcotics)  What You Need to Know    What are opioids?   Opioids are pain medicines that must be prescribed by a doctor.  They are also known as narcotics.    Examples are:     morphine (MS Contin, Amie)    oxycodone (Oxycontin)    oxycodone and acetaminophen (Percocet)    hydrocodone and acetaminophen (Vicodin, Norco)     fentanyl patch (Duragesic)     hydromorphone (Dilaudid)     methadone     What do opioids do well?   Opioids are best for short-term pain after a surgery or injury. They also work well for cancer pain. Unlike other pain medicines, they do not cause liver or kidney failure or ulcers. They may help some people with long-lasting (chronic) pain.     What do opioids NOT do well?   Opioids never get rid of pain entirely, and they do not work well for most patients with chronic pain. Opioids do not reduce swelling, one of the causes of pain. They also don t work well for nerve pain.                           For informational purposes only.  Not to replace the advice of your care provider.  Copyright [facility]. All right reserved. [employer] [id number]-Uad 02/18.      Page 2 of 2    Risks and side effects   Talk to your doctor before you start or decide to keep taking one of these medicines. Side effects include:    Lowering your breathing rate enough to cause death    Overdose, including death, especially if taking higher than prescribed doses    Long-term opioid use    Worse depression symptoms; less pleasure in things you usually enjoy    Feeling tired or sluggish    Slower thoughts or cloudy thinking    Being more sensitive to pain over time; pain is harder to control    Trouble sleeping or restless sleep    Changes in hormone levels (for example, less testosterone)    Changes in sex drive or ability to have sex    Constipation    Unsafe driving    Itching and sweating    Feeling dizzy    Nausea, vomiting and dry  mouth    What else should I know about opioids?  When someone takes opioids for too long or too often, they become dependent. This means that if you stop or reduce the medicine too quickly, you will have withdrawal symptoms.    Dependence is not the same as addiction. Addiction is when people keep using a substance that harms their body, their mind or their relations with others. If you have a history of drug or alcohol abuse, taking opioids can cause a relapse.    Over time, opioids don t work as well. Most people will need higher and higher doses. The higher the dose, the more serious the side effects. We don t know the long-term effects of opioids.      Prescribed opioids aren't the best way to manage chronic pain    Other ways to manage pain include:      Ibuprofen or acetaminophen.  You should always try this first.      Treat health problems that may be causing pain.      acupuncture or massage, deep breathing, meditation, visual imagery, aromatherapy.      Use heat or ice at the pain site      Physical therapy and exercise      Stop smoking      See a counselor or therapist                                                  People who have used opioids for a long time may have a lower quality of life, worse depression, higher levels of pain and more visits to doctors.    Never share your opioids with others. Be sure to store opioids in a secure place, locked if possible.Young children can easily swallow them and overdose.     You can overdose on opioids.  Signs of overdose include decrease or loss of consciousness, slowed breathing, trouble waking and blue lips.  If someone is worried about overdose, they should call 911.    If you are at risk for overdose, you may get naloxone (Narcan, a medicine that reverses the effects of opioids.  If you overdose, a friend or family member can give you Narcan while waiting for the ambulance.  They need to know the signs of overdose and how to give Narcan.    While you're  taking opioids:    Don't use alcohol or street drugs. Taking them together can cause death.    Don't take any of these medicines unless your doctor says its okay.  Taking these with opioids can cause death.    Benzodiazepines (such as lorazepam         or diazepam)    Muscle relaxers (such as cyclobenzaprine)    sleeping pills    other opioids    Safe disposal of opioids  Find your area drug take-back program, your pharmacy mail-back program, buy a special disposal bag (such as Deterra) from your pharmacy or flush them down the toilet.  Use the guidelines at:  www.fda.gov/drugs/resourcesforyou

## 2019-04-25 ENCOUNTER — MYC MEDICAL ADVICE (OUTPATIENT)
Dept: CARE COORDINATION | Facility: CLINIC | Age: 29
End: 2019-04-25

## 2019-04-25 ENCOUNTER — PATIENT OUTREACH (OUTPATIENT)
Dept: CARE COORDINATION | Facility: CLINIC | Age: 29
End: 2019-04-25

## 2019-04-25 ENCOUNTER — TELEPHONE (OUTPATIENT)
Dept: FAMILY MEDICINE | Facility: OTHER | Age: 29
End: 2019-04-25

## 2019-04-25 ENCOUNTER — MYC MEDICAL ADVICE (OUTPATIENT)
Dept: FAMILY MEDICINE | Facility: OTHER | Age: 29
End: 2019-04-25

## 2019-04-25 DIAGNOSIS — R11.2 NAUSEA AND VOMITING, INTRACTABILITY OF VOMITING NOT SPECIFIED, UNSPECIFIED VOMITING TYPE: Primary | ICD-10-CM

## 2019-04-25 RX ORDER — PROMETHAZINE HYDROCHLORIDE 25 MG/1
25 TABLET ORAL EVERY 6 HOURS PRN
Qty: 30 TABLET | Refills: 0 | Status: SHIPPED | OUTPATIENT
Start: 2019-04-25 | End: 2019-06-25

## 2019-04-25 ASSESSMENT — ACTIVITIES OF DAILY LIVING (ADL): DEPENDENT_IADLS:: INDEPENDENT

## 2019-04-25 NOTE — PROGRESS NOTES
Clinic Care Coordination Contact    Clinic Care Coordination Contact  OUTREACH    Referral Information: MAUREEN CC asked CHRISTIANNE CC to outreach to pt to assist her with some financial resources due to medical bills  Referral Source: ED Follow-Up    Primary Diagnosis: Neurological Disorders(Neck pain)    Chief Complaint   Patient presents with     Clinic Care Coordination - Initial     SW (financial resources)        Universal Utilization:   Clinic Utilization  Difficulty keeping appointments:: Yes  Compliance Concerns: No  No-Show Concerns: Yes  No PCP office visit in Past Year: No  Utilization    Last refreshed: 4/25/2019  2:35 PM:  Hospital Admissions 0           Last refreshed: 4/25/2019  2:35 PM:  ED Visits 10           Last refreshed: 4/25/2019  2:35 PM:  No Show Count (past year) 16              Current as of: 4/25/2019  2:35 PM            Clinical Concerns:  Patient Active Problem List   Diagnosis     Hearing loss     Allergic rhinitis     Migraine     Benign neoplasm of skin of lower limb, including hip     Dysmenorrhea     Crohn's colitis (H)     Mild major depression (H)     Anxiety     Chronic pain     Oral thrush     Nausea and vomiting     Bipolar disorder (H)     Marijuana abuse     Adjustment disorder with mixed anxiety and depressed mood     Herpes simplex virus infection     Gastroesophageal reflux disease without esophagitis     Bee sting allergy     Mild persistent asthma without complication     Chronic bilateral low back pain with left-sided sciatica     Vitamin D deficiency     Atypical mole     Lumbar disc disease with radiculopathy     S/P lumbar fusion     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)     Upper GI bleed - suspected     Tobacco use disorder     History of pseudoseizure     Closed fracture of coccyx with nonunion, subsequent encounter     Postoperative pain     Iliotibial band syndrome affecting lower leg, right     Chondromalacia of right patellofemoral joint     Melanocytic  nevus, unspecified location     Dysplastic skin lesion     Patellar maltracking, right     Right anterior knee pain     Hypophosphatemia     Class 1 obesity due to excess calories without serious comorbidity with body mass index (BMI) of 32.0 to 32.9 in adult     Mild intermittent asthma     Cervicalgia     Bulge of cervical disc without myelopathy       Current Medical Concerns:  CHRISTIANNE CC spoke with pt via  as pt is deaf. RN CC spoke with pt for f/u from ED visit.      Current Behavioral Concerns: No mood/behavior concerns discussed.     Education Provided to patient: Introduced self and explained to pt that she had spoke with RN CC who requested contact pt with some financial resources. See financial section.    Pain  Pain (GOAL):: Yes  Type: Chronic (>3mo)  Location of chronic pain:: low back pain  Health Maintenance Reviewed: Due/Overdue   Health Maintenance Due   Topic Date Due     HIV SCREEN (SYSTEM ASSIGNED)  03/18/2007     ASTHMA CONTROL TEST Q6 MOS  02/10/2019       Clinical Pathway: None    Medication Management:  See medication list. Pt takes own medications. Pt states she has good coverage for prescription drugs and typically pays very little.      Functional Status:  Dependent ADLs:: Independent  Dependent IADLs:: Independent  Bed or wheelchair confined:: No  Mobility Status: Independent  Fallen 2 or more times in the past year?: No  Any fall with injury in the past year?: No    Living Situation:  Current living arrangement:: Not Assessed    Diet/Exercise/Sleep:  Diet:: Regular  Inadequate nutrition (GOAL):: No  Food Insecurity: No  Tube Feeding: No  Exercise:: Currently not exercising  Inadequate activity/exercise (GOAL):: No  Significant changes in sleep pattern (GOAL): No    Transportation:  Transportation concerns (GOAL):: No  Transportation means:: Regular car     Psychosocial:  Mormonism or spiritual beliefs that impact treatment:: No  Mental health DX:: Yes  Mental health DX how  managed:: Medication  Mental health management concern (GOAL):: No  Informal Support system:: Family, Parent     Financial/Insurance: Pt is on Social Security disability and has Medicare. Pt doesn't have other insurance at this time. She has been on MA in the past and no longer qualifies. Pt said when she completed her renewal she was told she not longer qualifies as she earns too much in SSD income. Sounds like pt does have some coverage when the expenses are over $400/month. Pt needing help with the bills she is receiving due to all of the medical costs accrued lately. Discussed [facility] program and suggested she talk with a [employer] counselor [phone]. Will send link to [facility] application to [facility] [employer] as well. Pt had some questions about disability and CHRISTIANNE PALENCIA suggested she contact [employer] [place] [phone] and  [phone]  as they would be equipped to answer her questions. Pt was given all the contact numbers for the resources. Asked if pt wanted a [facility] [employer] message sent as well and she agreed that would be good to have too.   Financial/Insurance concerns (GOAL):: Yes  Medicare     Resources and Interventions:  Current Resources:      Community Resources: County Worker, Other (see comment)(Disability)  Supplies used at home:: Nebulizer tubing  Equipment Currently Used at Home: none    Advance Care Plan/Directive  Advanced Care Plans/Directives on file:: No    Referrals Placed: Financial Services, Disability Specialists, Disability Linkage line     Goals: n/a    Patient/Caregiver understanding: yes       Future Appointments              In 4 days Dayana Roldan; Darline Rizvi, PT [facility]    In 6 days Dayana Roldan; Aura Rosario PA-C [facility]    In 1 week Dayana Roldan; Oneyda Woodruff, PT [facility]    In 1 week Junaid Pritchett,  LOVE FOURNIER [facility] [place]    In 1 week Darline Rizvi, PT [facility] [place]    In 2 weeks Oneyda Woodruff, PT [facility] [place]    In 2 weeks Kacy, Oneyda, PT [facility] [place]    In 3 weeks Shazia Chadwick, NP [facility]    In 3 weeks Kacy, Oneyda, PT [facility] [place]    In 3 weeks Gonvick, Oneyda, PT; NL POOL [facility] [place]    In 3 weeks Kacy, Oneyda, PT; NL POOL [facility] [place]    In 1 month Kacy, Oneyda, PT; NL POOL [facility] [place]    In 1 month Gonvick, Oneyda, PT; NL [facility] [place]          Plan: CHRISTIANNE PALENCIA sent pt [employer] message with phone numbers for resources discussed on the call. Encouraged pt to call if has further questions and she said she would. CHRISTIANNE PALENCIA will keep pt on Care Team another 3-4 weeks in case further questions arise.       DOYLE Torres   Primary Care Clinic- Social Work Care Coordinator  [facility]  4/25/2019 2:38 PM  [phone]

## 2019-04-25 NOTE — TELEPHONE ENCOUNTER
Katy Islas is a 29 year old female who calls with vomiting.    NURSING ASSESSMENT:  Description: Patient states she vomited twice today, the last had blood noted in it.   Onset/duration:  Ongoing  Associated symptoms: Not eating x 4-5 days. She is not drinking anything. She feels dehydrated. She last urinated this morning  Improves/worsens symptoms:  Tried Compazine at 11:30 and vomited after lunch.   Last exam/Treatment:  4/24/19  Allergies:   Allergies   Allergen Reactions     Ativan [Lorazepam] Hives     At the IV site     Baclofen      hives     Bees Hives, Swelling and Difficulty breathing     Caffeine      Contrast Dye      Hives,   Updated 5/10/2016 CT Contrast.     Iodine Hives     Methocarbamol Swelling     Metoclopramide      Other reaction(s): Tremors  LW Reaction: shaking/sweating     Midazolam      Other reaction(s): Agitation     Monosodium Glutamate      Morphine Other (See Comments)     Difficulty with urination     Nsaids Other (See Comments)     GI BLEED x2     Other (Do Not Use) Other (See Comments)     Xanaflex- pt becomes disoriented and loses bladder control     Reglan [Metoclopramide Hcl] Other (See Comments)     shaking     Soma [Carisoprodol] Visual Disturbance     Sleep walking     Tizanidine      Topamax Other (See Comments)     Topamax [Topiramate] Nausea     Tingling  GI/Vomit     Tramadol      Severe Headache, Seizure Risk     Tylenol W/Codeine [Acetaminophen-Codeine] Nausea and Itching     Tylenol 3     Valium Other (See Comments)     Becomes aggressive and angry     Versed Other (See Comments)     Zolmitriptan      Makes face feel like its twitching     Droperidol Anxiety     Flu Virus Vaccine Rash      Arm swelling       RECOMMENDED DISPOSITION:  To ED for dehydration  Will comply with recommendation: no - huddled with CDL, orders placed for Phenergan to try for nausea, MyChart sent.   If further questions/concerns or if Sx do not improve, worsen or new Sx develop, call your  PCP or [facility] as soon as possible.    NOTES:  Disposition was determined by the first positive assessment question, therefore all previous assessment questions were negative.     Guideline used:  Telephone Triage Protocols for Nurses, Fifth Edition, Telma Roy, MAUREEN, BSN

## 2019-04-25 NOTE — TELEPHONE ENCOUNTER
Reason for call:  Patient reporting a symptom Vomiting    Symptom or request: Vomiting     Duration (how long have symptoms been present): She has been feeling this way for a long time.     Have you been treated for this before? 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 Will send to triage.     Phone Number patient can be reached at:  Cell number on file:    Telephone Information:   Mobile [phone]       Best Time:      Can we leave a detailed message on this number:  YES    Call taken on 4/25/2019 at 3:12 PM by Loida Baptiste

## 2019-04-26 NOTE — PROGRESS NOTES
SUBJECTIVE:   Katy Islas is a 29 year old female who presents to clinic today for the following health issues:      HPI  Concern - Vomiting  Onset: For a long time    Description:   Ongoing for a long time, she is having a lot of pain in her neck still, she had her endoscopy 4/12/19 the results showed a hiatal hernia.     Intensity: every 5 min on Saturday: 1 episode yesterday     Progression of Symptoms:  Improving: depending on what she eats    Accompanying Signs & Symptoms:  Constipated, blown blood vessel in eye, dry mouth     Previous history of similar problem:   Yes: lost a lot of weight 2012 stopped and now started again     Precipitating factors:   Worsened by: Food     Alleviating factors:  Improved by: no tomato sauce no ketchup, nothing spicy, no alcohol  Therapies Tried and outcome: Zofran- didn't help     - Woke up at 230 am, arm was numb, took tylenol and was able to sleep until 630      Pain medication makes constipated, so stopped, didn't take all weekend   - Needs Miralax, works well   - Gatorade when vomiting, to keep electrolytes up and bananas    - GI 5/6/19   - threw up yesterday once, not at all today, depends on what she eats   - Phenergan didn't help, liquid form didn't help, compazine and zofran didn't work   - Physical therapy starts today   - Clinic canceled 5/14/19 myelogram because Dr. Millan            Additional history: as documented      BP Readings from Last 3 Encounters:   04/29/19 126/86   04/24/19 124/78   04/22/19 (!) 133/103    Wt Readings from Last 3 Encounters:   04/29/19 83.6 kg (184 lb 6.4 oz)   04/24/19 82.6 kg (182 lb)   04/22/19 85.7 kg (189 lb)                  Labs reviewed in EPIC    ROS:  Constitutional, HEENT, cardiovascular, pulmonary, gi and gu systems are negative, except as otherwise noted.    OBJECTIVE:   /86   Pulse 91   Temp 98.6  F (37  C) (Temporal)   Resp 18   Wt 83.6 kg (184 lb 6.4 oz)   LMP  (LMP Unknown)   SpO2 97%   BMI 33.72  kg/m    Body mass index is 33.72 kg/m .  GENERAL APPEARANCE: healthy, alert and no distress  EYES: Eyes grossly normal to inspection, PERRLA, conjunctivae and sclerae without injection or discharge, EOM intact   RESP: Lungs clear to auscultation - no rales, rhonchi or wheezes    CV: Regular rates and rhythm, normal S1 S2, no S3 or S4, no murmur, click or rub, no peripheral edema and peripheral pulses strong and symmetric bilaterally   MS: No musculoskeletal defects are noted and gait is age appropriate without ataxia   SKIN: No suspicious lesions or rashes, hydration status appears adeuqate with normal skin turgor   PSYCH: Alert and oriented x3; speech- coherent , normal rate and volume; able to articulate logical thoughts, able to abstract reason, no tangential thoughts, no hallucinations or delusions, mentation appears normal, Mood is euthymic. Affect is appropriate for this mood state and bright. Thought content is free of suicidal ideation, hallucinations, and delusions. Dress is adequate and upkept. Eye contact is good during conversation.       Diagnostic Test Results:  none     ASSESSMENT/PLAN:       ICD-10-CM    1. Crohn's disease of colon without complication (H) K50.10 polyethylene glycol (MIRALAX/GLYCOLAX) powder     prochlorperazine (COMPAZINE) 25 MG suppository   2. Cervicalgia M54.2 ketorolac (TORADOL) injection 30 mg   3. Chronic pain syndrome G89.4 ketorolac (TORADOL) injection 30 mg     oxyCODONE (ROXICODONE) 5 MG tablet   4. Chronic bilateral low back pain with left-sided sciatica M54.42     G89.29    5. Bulge of cervical disc without myelopathy M50.20 oxyCODONE (ROXICODONE) 5 MG tablet   6. Nausea and vomiting, intractability of vomiting not specified, unspecified vomiting type R11.2      1. & 2.   - Patient with Crohn's and chronic nausea and vomiting   - Had consult with [facility] [place], had EGD told has hiatal hernia, follow up with GI scheduled for 5/16/19   - Wanting something for  nausea - compazine, zofran, and phenergan didn't work, threw them up  - Offered compazine suppository, patient willing to try, reviewed use and side effects   - Continue to follow with GI   - Discussed warning signs that would warrant return to clinic/ED      3-6. Chronic pain   - Patient just seen on 4/24/19   - States used some oxycodone, did not bring the bottle today, but didn't use over the weekend due to constipation   - Will give Miralax for constipation as this has helped in the past     - - Patient with complicated spinal history including L4-5 disectomy (10/19/11), L4-5 fusion (2/23/17), coccyx fracture and removal (complicated by infection - 10/18/17), and SI Joint fusion (10/26/18)   - Records received from [facility] - Dr. 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, improving with conservative therapy including physical therapy, injection (done yesterday 4/23/19), and bracing       Recommending thoracic and lumbar myelogram and post myelo CT, surgeon to follow up after this is complete for finalization of plan      - Patient is on medical marijuana for mood/PTSD issues (following with counseling and psychiatry as well)   -  reviewed   - CSA - 4/24/19        Oxycodone 5 mg, max 4/day       Reviewed risks of narcotic therapy including sedation, addiction, and constipation       Has had history of drug-seeking behavior but not for awhile, as above does have pathology     - Patient frustrated with care, will get second opinion with [facility] (scheduled for 5/16/19)       Encouraged her to still keep appointment for CT myelogram (originally scheduled for 5/14/19 with Dr. Millan but per patient he is out so she was told to schedule with [facility] for this)   - Encouraged her to keep scheduled physical therapy which will start today 4/29/19     - Gave 1 week RX dated for Thursday 5/2/19 since was given one week rx on  4/24/19     Patient to call for next rx due around 5/9/19 and I will approve, patient frustrated with this but I told her that I wasn't going to give her another script until I see how fast she is using it (per her report above didn't take for 2 days over the weekend) plus she should be starting to taper down as her injection from 4/23/19 should start to be effective     - Patient requesting Toradol for pain as has helped in the past, this was given after reviewing use and side effects     - Recheck 2 weeks     The patient indicates understanding of these issues and agrees with the plan.    Follow up: 2 weeks and with specialists as scheduled     Due to language barrier, an  was present during the history-taking and subsequent discussion (and for part of the physical exam) with this patient.        Aura Rosario PA-C  [facility]

## 2019-04-29 ENCOUNTER — OFFICE VISIT (OUTPATIENT)
Dept: FAMILY MEDICINE | Facility: OTHER | Age: 29
End: 2019-04-29
Payer: MEDICARE

## 2019-04-29 ENCOUNTER — TELEPHONE (OUTPATIENT)
Dept: FAMILY MEDICINE | Facility: OTHER | Age: 29
End: 2019-04-29

## 2019-04-29 VITALS
BODY MASS INDEX: 33.72 KG/M2 | TEMPERATURE: 98.6 F | HEART RATE: 91 BPM | RESPIRATION RATE: 18 BRPM | OXYGEN SATURATION: 97 % | SYSTOLIC BLOOD PRESSURE: 126 MMHG | WEIGHT: 184.4 LBS | DIASTOLIC BLOOD PRESSURE: 86 MMHG

## 2019-04-29 DIAGNOSIS — M50.30 BULGE OF CERVICAL DISC WITHOUT MYELOPATHY: ICD-10-CM

## 2019-04-29 DIAGNOSIS — M54.2 CERVICALGIA: ICD-10-CM

## 2019-04-29 DIAGNOSIS — G89.4 CHRONIC PAIN SYNDROME: ICD-10-CM

## 2019-04-29 DIAGNOSIS — K50.10 CROHN'S DISEASE OF COLON WITHOUT COMPLICATION (H): Primary | ICD-10-CM

## 2019-04-29 DIAGNOSIS — M54.42 CHRONIC BILATERAL LOW BACK PAIN WITH LEFT-SIDED SCIATICA: ICD-10-CM

## 2019-04-29 DIAGNOSIS — R11.2 NAUSEA AND VOMITING, INTRACTABILITY OF VOMITING NOT SPECIFIED, UNSPECIFIED VOMITING TYPE: ICD-10-CM

## 2019-04-29 DIAGNOSIS — G89.29 CHRONIC BILATERAL LOW BACK PAIN WITH LEFT-SIDED SCIATICA: ICD-10-CM

## 2019-04-29 PROCEDURE — 96372 THER/PROPH/DIAG INJ SC/IM: CPT | Performed by: PHYSICIAN ASSISTANT

## 2019-04-29 PROCEDURE — 80307 DRUG TEST PRSMV CHEM ANLYZR: CPT | Mod: 90 | Performed by: PHYSICIAN ASSISTANT

## 2019-04-29 PROCEDURE — 99214 OFFICE O/P EST MOD 30 MIN: CPT | Mod: 25 | Performed by: PHYSICIAN ASSISTANT

## 2019-04-29 RX ORDER — POLYETHYLENE GLYCOL 3350 17 G/17G
1 POWDER, FOR SOLUTION ORAL DAILY
Qty: 238 G | Refills: 3 | Status: SHIPPED | OUTPATIENT
Start: 2019-04-29 | End: 2019-07-08

## 2019-04-29 RX ORDER — KETOROLAC TROMETHAMINE 30 MG/ML
30 INJECTION, SOLUTION INTRAMUSCULAR; INTRAVENOUS ONCE
Status: COMPLETED | OUTPATIENT
Start: 2019-04-29 | End: 2019-04-29

## 2019-04-29 RX ORDER — OXYCODONE HYDROCHLORIDE 5 MG/1
5 TABLET ORAL EVERY 8 HOURS PRN
Qty: 30 TABLET | Refills: 0 | Status: SHIPPED | OUTPATIENT
Start: 2019-05-02 | End: 2019-05-06

## 2019-04-29 RX ORDER — PROCHLORPERAZINE 25 MG
25 SUPPOSITORY, RECTAL RECTAL EVERY 12 HOURS PRN
Qty: 12 SUPPOSITORY | Refills: 3 | Status: SHIPPED | OUTPATIENT
Start: 2019-04-29 | End: 2019-09-18

## 2019-04-29 RX ADMIN — KETOROLAC TROMETHAMINE 30 MG: 30 INJECTION, SOLUTION INTRAMUSCULAR; INTRAVENOUS at 10:10

## 2019-04-29 ASSESSMENT — PAIN SCALES - GENERAL: PAINLEVEL: MODERATE PAIN (5)

## 2019-04-29 NOTE — TELEPHONE ENCOUNTER
PA Initiation    Medication: rizatriptan ODT (MAXALT) 5 MG tablet- INITIATED  Insurance Company: [employer] - Phone [phone] Fax [phone]  Pharmacy Filling the Rx: [facility] [address] [place] [address] [address]  Filling Pharmacy Phone: [phone]  Filling Pharmacy Fax:    Start Date: 4/29/2019

## 2019-04-29 NOTE — TELEPHONE ENCOUNTER
Prior Authorization Approval    Authorization Effective Date: 1/29/2019  Authorization Expiration Date: 4/28/2020  Medication: rizatriptan ODT (MAXALT) 5 MG tablet- APPROVED  Approved Dose/Quantity: 18 PER 30 DAYS  Reference #: [id number]   Insurance Company: [employer] - Phone [phone] Fax [phone]  Expected CoPay:       CoPay Card Available:      Foundation Assistance Needed:    Which Pharmacy is filling the prescription (Not needed for infusion/clinic administered): [facility] [address] [place] [address] AT [address]  Pharmacy Notified: Yes  Patient Notified: Yes

## 2019-04-29 NOTE — PROGRESS NOTES
Prior to injection, verified patient identity using patient's name and date of birth.  Due to injection administration, patient instructed to remain in clinic for 15 minutes  afterwards, and to report any adverse reaction to me immediately.    toradol    Drug Amount Wasted:  None.  Vial/Syringe: Single dose vial  Expiration Date:  11/19

## 2019-04-29 NOTE — TELEPHONE ENCOUNTER
Tried to reach pt, please triage for today's appointment. Let message with interpretor     Claudine Jimenez, RN, BSN

## 2019-04-30 ENCOUNTER — TELEPHONE (OUTPATIENT)
Dept: FAMILY MEDICINE | Facility: OTHER | Age: 29
End: 2019-04-30

## 2019-04-30 ENCOUNTER — OFFICE VISIT (OUTPATIENT)
Dept: OBGYN | Facility: OTHER | Age: 29
End: 2019-04-30
Payer: MEDICARE

## 2019-04-30 VITALS
HEART RATE: 68 BPM | BODY MASS INDEX: 34.15 KG/M2 | WEIGHT: 186.75 LBS | DIASTOLIC BLOOD PRESSURE: 84 MMHG | SYSTOLIC BLOOD PRESSURE: 132 MMHG

## 2019-04-30 DIAGNOSIS — Z30.42 SURVEILLANCE FOR DEPO-PROVERA CONTRACEPTION: ICD-10-CM

## 2019-04-30 DIAGNOSIS — L73.9 FOLLICULITIS: Primary | ICD-10-CM

## 2019-04-30 PROBLEM — S32.2XXK: Status: RESOLVED | Noted: 2017-10-12 | Resolved: 2019-04-30

## 2019-04-30 PROBLEM — G89.18 POSTOPERATIVE PAIN: Status: RESOLVED | Noted: 2017-11-13 | Resolved: 2019-04-30

## 2019-04-30 PROCEDURE — 99203 OFFICE O/P NEW LOW 30 MIN: CPT | Performed by: ADVANCED PRACTICE MIDWIFE

## 2019-04-30 RX ORDER — MUPIROCIN 20 MG/G
OINTMENT TOPICAL 3 TIMES DAILY
COMMUNITY
End: 2019-09-18

## 2019-04-30 NOTE — PROGRESS NOTES
Katy Islas is a 29 year old who presents to the clinic for evaluation of painful sore in her perineal area.  She feels it is a bartholin cyst.   Was diagnosed with one at age 11.    Has been seen in urgent care recently for this and prescribed an ointment to apply.   She feels it opened last night and drained but still hurts today.   Has had this happen about three times in the last month.   Uses depo for cycle control and wants to know if she can/should get a dose early.   Her mom is with her and serves as her .        Histories reviewed and updated  Past Medical History:   Diagnosis Date     Abdominal pain 10/31- 11/4/2005    [facility] admit for Crohn's     Allergic rhinitis, cause unspecified     Allergic rhinitis     Arthritis      C. difficile diarrhea     Past, no current diarrhea.     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, without mention of intractable epilepsy     pseudoseizures diagnosed after extensive neurologic eval     Migraine 07/21/12    D/C 07/22/12-[facility]     Migraine, unspecified, without mention of intractable migraine without mention of status migrainosus     Migraine     Mild intermittent asthma     mild intermittent     Mycoplasma infection in conditions classified elsewhere and of unspecified site      Other chronic pain     Back pain for 6 years     Renal disease     Kidney stones     Unspecified hearing loss     congenital hearing loss     Past Surgical History:   Procedure Laterality Date     AS REMOVAL OF COCCYX   10/18/2017     C FUSION OF SACROILIAC JOINT  10/26/2018     COLONOSCOPY  7/1/2009    [facility], [facility].     COMBINED ESOPHAGOSCOPY, GASTROSCOPY, DUODENOSCOPY (EGD) WITH CO2 INSUFFLATION N/A 4/12/2019    Procedure: COMBINED ESOPHAGOSCOPY, GASTROSCOPY, DUODENOSCOPY (EGD) WITH CO2 INSUFFLATION;  Surgeon: Edwin Conde MD;  Location: [facility] OR     ESOPHAGOSCOPY, GASTROSCOPY, DUODENOSCOPY (EGD), COMBINED N/A 4/12/2019    Procedure: Combined Esophagoscopy, Gastroscopy, Duodenoscopy (Egd), Biopsy Single Or Multiple;  Surgeon: Edwin Conde MD;  Location: [facility] OR     FUSION SPINE POSTERIOR MINIMALLY INVASIVE ONE LEVEL N/A 2/23/2017    Procedure: FUSION SPINE POSTERIOR MINIMALLY INVASIVE ONE LEVEL;  L4-5 Oblique Lateral Lumbar Interbody Fusion   Epidural steroid injection.   Transpedicular Bone marrow aspiration;  Surgeon: Jeniffer Eugene MD;  Location: [facility] OR     HC COLONOSCOPY THRU STOMA WITH BIOPSY  10/29/2003    Impression is that of normal appearing colonoscopy, without evidence of rectal bleeding.     HC COLONOSCOPY THRU STOMA, DIAGNOSTIC  10/00    normal     HC COLONOSCOPY THRU STOMA, DIAGNOSTIC  Oct 2009    Dr López- normal     HC EEG AWAKE AND SLEEP      abnormal     HC MRI BRAIN W/O CONTRAST  12/00    normal     HC REMOVAL GALLBLADDER  8/5/2009    [facility], [facility].     HC UGI ENDOSCOPY DIAG W BIOPSY  11/11/09    Normal esophagus     HC UGI ENDOSCOPY, SIMPLE EXAM  7/00, 10/00    mild chronic esophagitis and duodenitis, neg H pylori     HC UGI ENDOSCOPY, SIMPLE EXAM  01/20/2005    Esophagogastroduodenoscopy, colonoscopy with biopsies.  [facility]     HC UGI ENDOSCOPY, SIMPLE EXAM  7/1/2009    [facility], [facility].      UGI ENDOSCOPY, SIMPLE EXAM  11/11/2009    attempted upper GI, pt. could not tolerate procedure:[facility]     ORTHOPEDIC SURGERY  October 19,2011    diskectomy L4-L5     Social History     Socioeconomic History      Marital status: Single     Spouse name: Not on file     Number of children: Not on file     Years of education: Not on file     Highest education level: Not on file   Occupational History     Not on file   Social Needs     Financial resource strain: Not on file     Food insecurity:     Worry: Not on file     Inability: Not on file     Transportation needs:     Medical: Not on file     Non-medical: Not on file   Tobacco Use     Smoking status: Current Every Day Smoker     Packs/day: 0.50     Years: 5.00     Pack years: 2.50     Types: Cigarettes     Smokeless tobacco: Never Used     Tobacco comment: chantix   Substance and Sexual Activity     Alcohol use: Not Currently     Comment: Not since last July     Drug use: Yes     Types: Marijuana     Comment: Medical Marijuana currently     Sexual activity: Not Currently     Partners: Male     Birth control/protection: Condom, Injection   Lifestyle     Physical activity:     Days per week: Not on file     Minutes per session: Not on file     Stress: Not on file   Relationships     Social connections:     Talks on phone: Not on file     Gets together: Not on file     Attends Hinduism service: Not on file     Active member of club or organization: Not on file     Attends meetings of clubs or organizations: Not on file     Relationship status: Not on file     Intimate partner violence:     Fear of current or ex partner: Not on file     Emotionally abused: Not on file     Physically abused: Not on file     Forced sexual activity: Not on file   Other Topics Concern      Service No     Blood Transfusions No     Caffeine Concern No     Occupational Exposure No     Hobby Hazards No     Sleep Concern No     Stress Concern No     Weight Concern No     Special Diet No     Back Care No     Exercise Yes     Bike Helmet No     Seat Belt Yes     Self-Exams Yes     Parent/sibling w/ CABG, MI or angioplasty before 65F 55M? Yes     Comment: father late 30's early 40's    Social  History Narrative     Not on file     Family History   Problem Relation Age of Onset     Gastrointestinal Disease Brother         severe Crohn's     Neurologic Disorder Brother         Seizures post head injury     Depression Brother      Substance Abuse Brother      Genitourinary Problems Father         kidney stones     Diabetes Father      Heart Disease Father         Open heart surgery     Breast Cancer Maternal Grandmother      Parkinsonism Maternal Grandmother      Cerebrovascular Disease Paternal Grandmother      Cancer Maternal Grandfather         Lung     Cardiovascular Paternal Grandfather         Heart Attack     Substance Abuse Mother      Lung Cancer Paternal Uncle      Cancer Paternal Uncle      Lung Cancer Maternal Uncle             ROS: 10 point ROS neg other than the symptoms noted above in the HPI.    EXAM:  /84 (BP Location: Right arm, Patient Position: Sitting, Cuff Size: Adult Large)   Pulse 68   Wt 84.7 kg (186 lb 12 oz)   LMP  (LMP Unknown)   BMI 34.15 kg/m        : PELVIC EXAM:  Vulva: No external lesions, normal hair distribution, no adenopathy, BUS WNL  Aside from a small area at the top of the right labia/mons area that appears to be folliculitis.  There is no drainage currently and also doesn't appear to have drained recently.   Vagina: Moist, pink, no abnormal discharge, well rugated, no lesions.  Bartholin and skene glands are normal.   Rectal exam: deferred        ASSESSMENT/PLAN:  (L73.9) Folliculitis  (primary encounter diagnosis)  Comment:   Plan:     (Z30.42) Surveillance for Depo-Provera contraception  Comment:   Plan:     Reviewed anatomy and why her current concern is not a bartholin cyst.   May continue the ointment.   Wash the area twice per day.   Use WMS for comfort and to speed resolution.   She will be back in the office to see her PCP within the month so recommend to defer administration of depo until then.   Discussed black box warning,   I see no reason why  she can't continue depo for as long as she likes it.   Reviewed calcium intake and exercise.     Visit length 30 minutes was spent face to face with the patient today discussing her history, diagnosis, and follow-up plan as noted above.  Over 50% of the visit was spent in counseling and coordination of care.    Time noted does not include time required to complete procedures.

## 2019-04-30 NOTE — NURSING NOTE
"Chief Complaint   Patient presents with     Vaginal Problem     check vaginal cyst       Initial /84 (BP Location: Right arm, Patient Position: Sitting, Cuff Size: Adult Large)   Pulse 68   Wt 84.7 kg (186 lb 12 oz)   LMP  (LMP Unknown)   BMI 34.15 kg/m   Estimated body mass index is 34.15 kg/m  as calculated from the following:    Height as of 4/11/19: 1.575 m (5' 2.01\").    Weight as of this encounter: 84.7 kg (186 lb 12 oz).  Medication Reconciliation: complete    Amber Tirado CMA    "

## 2019-04-30 NOTE — PATIENT INSTRUCTIONS
May continue to use ointment from urgent care.   Bathe once a day and wash the area again 12 hours later.   May use warm moist soaks to the area 3-4 times a day.

## 2019-04-30 NOTE — TELEPHONE ENCOUNTER
[facility] is requesting that patient see JKD today for vaginal cyst.   Patient agrees and was rescheduled.     Next 5 appointments (look out 90 days)    Apr 30, 2019 11:00 AM CDT  Office Visit with NATA Rodríguez CNM  [facility] [address] [facility] [id number]  [phone]   May 06, 2019 11:00 AM CDT  Return Visit with Junaid Pritchett PA-C, ASL IS  [facility] [address] [place] [id number]  [phone]   May 13, 2019  9:30 AM CDT  Office Visit with Aura Rosario PA-C  [facility] [address] [facility] [address] [place] [id number]  [phone]        Ines Roy, RN, BSN

## 2019-05-01 ENCOUNTER — TELEPHONE (OUTPATIENT)
Dept: FAMILY MEDICINE | Facility: OTHER | Age: 29
End: 2019-05-01

## 2019-05-01 DIAGNOSIS — R42 DIZZINESS: ICD-10-CM

## 2019-05-01 RX ORDER — MECLIZINE HCL 12.5 MG 12.5 MG/1
12.5 TABLET ORAL 4 TIMES DAILY PRN
Qty: 30 TABLET | Refills: 1 | Status: SHIPPED | OUTPATIENT
Start: 2019-05-01 | End: 2019-06-25

## 2019-05-01 NOTE — TELEPHONE ENCOUNTER
"CD[facility]-Pt requestin. Wondering if she can fill her Oxycodone today, leaving at 0400 tomorrow for [place]  2. She is wondering if you would fill her meclizine for dizziness    [facility] [place]pharmacy    Katy Islas is a 29 year old female who calls with dizziness.    NURSING ASSESSMENT:  Description:  I spoke with the pt who states she woke up this morning and does not feel well. She has a pain in the left side of her neck, feels dizzy and feels \"out of it\". She is wondering if [facility] can send a medication to the pharmacy for \"my dizziness stuff\", meclizine. Going to [place] in the morning.  States the dizziness may be from her HA. HA started 2 hour ago. She is not sure what caused the neck pain. States she slept \"good\". She has a herniated disc in her neck.   Onset/duration:  2 hours ago  Pain scale (0-10)   5/10 neck pain, HA 6/10    Allergies:   Allergies   Allergen Reactions     Ativan [Lorazepam] Hives     At the IV site     Baclofen      hives     Bees Hives, Swelling and Difficulty breathing     Caffeine      Contrast Dye      Hives,   Updated 5/10/2016 CT Contrast.     Iodine Hives     Methocarbamol Swelling     Metoclopramide      Other reaction(s): Tremors  LW Reaction: shaking/sweating     Midazolam      Other reaction(s): Agitation     Monosodium Glutamate      Morphine Other (See Comments)     Difficulty with urination     Nsaids Other (See Comments)     GI BLEED x2     Other (Do Not Use) Other (See Comments)     Xanaflex- pt becomes disoriented and loses bladder control     Reglan [Metoclopramide Hcl] Other (See Comments)     shaking     Soma [Carisoprodol] Visual Disturbance     Sleep walking     Tizanidine      Topamax Other (See Comments)     Topamax [Topiramate] Nausea     Tingling  GI/Vomit     Tramadol      Severe Headache, Seizure Risk     Tylenol W/Codeine [Acetaminophen-Codeine] Nausea and Itching     Tylenol 3     Valium Other (See Comments)     Becomes aggressive and angry     " Versed Other (See Comments)     Zolmitriptan      Makes face feel like its twitching     Droperidol Anxiety     Flu Virus Vaccine Rash      Arm swelling     NURSING PLAN: Routed to provider Yes    RECOMMENDED DISPOSITION:  Wait to hear from provider  Will comply with recommendation: Yes  If further questions/concerns or if symptoms do not improve, worsen or new symptoms develop, call your PCP or [facility] as soon as possible.       Guideline used: dizziness, neck pain  Telephone Triage Protocols for Nurses, Fifth Edition, Telma Jimenez RN

## 2019-05-01 NOTE — TELEPHONE ENCOUNTER
See other encounter - ok'd oxycodone to be filled 1 day early     OK for Meclizine, refilled     Zach Tomlinson-LOVE Moss  [facility]

## 2019-05-01 NOTE — TELEPHONE ENCOUNTER
Reason for Call:  Other prescription    Detailed comments: Dana calling from [facility] states patient has been contacting them asking to fill the oxycodone prescription today that's written to be filled for tomorrow 05/2/19. Dana states patient stated shes leaving for [place] tomorrow and needing this filled today. Please call pharmacy at [phone]    Phone Number Patient can be reached at: Home number on file [phone] (home)    Best Time: ANY    Can we leave a detailed message on this number? YES    Call taken on 5/1/2019 at 8:59 AM by Faina Pinon

## 2019-05-03 LAB — PAIN DRUG SCR UR W RPTD MEDS: NORMAL

## 2019-05-06 ENCOUNTER — TELEPHONE (OUTPATIENT)
Dept: GASTROENTEROLOGY | Facility: CLINIC | Age: 29
End: 2019-05-06

## 2019-05-06 ENCOUNTER — OFFICE VISIT (OUTPATIENT)
Dept: FAMILY MEDICINE | Facility: OTHER | Age: 29
End: 2019-05-06
Payer: MEDICARE

## 2019-05-06 ENCOUNTER — HOSPITAL ENCOUNTER (EMERGENCY)
Facility: CLINIC | Age: 29
Discharge: HOME | End: 2019-05-07
Attending: PHYSICIAN ASSISTANT | Admitting: PHYSICIAN ASSISTANT
Payer: MEDICARE

## 2019-05-06 ENCOUNTER — OFFICE VISIT (OUTPATIENT)
Dept: GASTROENTEROLOGY | Facility: CLINIC | Age: 29
End: 2019-05-06
Payer: MEDICARE

## 2019-05-06 VITALS
DIASTOLIC BLOOD PRESSURE: 80 MMHG | HEART RATE: 90 BPM | SYSTOLIC BLOOD PRESSURE: 110 MMHG | OXYGEN SATURATION: 96 % | TEMPERATURE: 97 F | RESPIRATION RATE: 20 BRPM

## 2019-05-06 VITALS
SYSTOLIC BLOOD PRESSURE: 124 MMHG | HEART RATE: 110 BPM | WEIGHT: 187.1 LBS | OXYGEN SATURATION: 99 % | DIASTOLIC BLOOD PRESSURE: 79 MMHG | BODY MASS INDEX: 34.21 KG/M2

## 2019-05-06 DIAGNOSIS — M50.30 BULGE OF CERVICAL DISC WITHOUT MYELOPATHY: ICD-10-CM

## 2019-05-06 DIAGNOSIS — H66.003 ACUTE SUPPURATIVE OTITIS MEDIA OF BOTH EARS WITHOUT SPONTANEOUS RUPTURE OF TYMPANIC MEMBRANES, RECURRENCE NOT SPECIFIED: Primary | ICD-10-CM

## 2019-05-06 DIAGNOSIS — G89.4 CHRONIC PAIN SYNDROME: ICD-10-CM

## 2019-05-06 DIAGNOSIS — K21.9 GASTROESOPHAGEAL REFLUX DISEASE WITHOUT ESOPHAGITIS: Primary | ICD-10-CM

## 2019-05-06 DIAGNOSIS — K44.9 HIATAL HERNIA: ICD-10-CM

## 2019-05-06 DIAGNOSIS — J06.9 VIRAL URI WITH COUGH: ICD-10-CM

## 2019-05-06 DIAGNOSIS — K64.8 INTERNAL HEMORRHOIDS: ICD-10-CM

## 2019-05-06 DIAGNOSIS — J45.21 MILD INTERMITTENT ASTHMA WITH ACUTE EXACERBATION: ICD-10-CM

## 2019-05-06 DIAGNOSIS — R11.2 NAUSEA AND VOMITING, INTRACTABILITY OF VOMITING NOT SPECIFIED, UNSPECIFIED VOMITING TYPE: ICD-10-CM

## 2019-05-06 DIAGNOSIS — R19.7 DIARRHEA: ICD-10-CM

## 2019-05-06 LAB
ALBUMIN SERPL-MCNC: 3.4 G/DL (ref 3.4–5)
ALP SERPL-CCNC: 87 U/L (ref 40–150)
ALT SERPL W P-5'-P-CCNC: 30 U/L (ref 0–50)
ANION GAP SERPL CALCULATED.3IONS-SCNC: 9 MMOL/L (ref 3–14)
AST SERPL W P-5'-P-CCNC: 16 U/L (ref 0–45)
BASOPHILS # BLD AUTO: 0 10E9/L (ref 0–0.2)
BASOPHILS NFR BLD AUTO: 0.3 %
BILIRUB SERPL-MCNC: 0.2 MG/DL (ref 0.2–1.3)
BUN SERPL-MCNC: 21 MG/DL (ref 7–30)
CALCIUM SERPL-MCNC: 8.9 MG/DL (ref 8.5–10.1)
CHLORIDE SERPL-SCNC: 109 MMOL/L (ref 94–109)
CO2 SERPL-SCNC: 27 MMOL/L (ref 20–32)
CREAT SERPL-MCNC: 0.71 MG/DL (ref 0.52–1.04)
DIFFERENTIAL METHOD BLD: ABNORMAL
EOSINOPHIL NFR BLD AUTO: 1.2 %
ERYTHROCYTE [DISTWIDTH] IN BLOOD BY AUTOMATED COUNT: 12.3 % (ref 10–15)
GFR SERPL CREATININE-BSD FRML MDRD: >90 ML/MIN/{1.73_M2}
GLUCOSE SERPL-MCNC: 110 MG/DL (ref 70–99)
HCT VFR BLD AUTO: 36.6 % (ref 35–47)
HGB BLD-MCNC: 12.4 G/DL (ref 11.7–15.7)
IMM GRANULOCYTES # BLD: 0 10E9/L (ref 0–0.4)
IMM GRANULOCYTES NFR BLD: 0.3 %
LYMPHOCYTES # BLD AUTO: 1.7 10E9/L (ref 0.8–5.3)
LYMPHOCYTES NFR BLD AUTO: 18 %
MCH RBC QN AUTO: 33.5 PG (ref 26.5–33)
MCHC RBC AUTO-ENTMCNC: 33.9 G/DL (ref 31.5–36.5)
MCV RBC AUTO: 99 FL (ref 78–100)
MONOCYTES # BLD AUTO: 1.1 10E9/L (ref 0–1.3)
MONOCYTES NFR BLD AUTO: 11.1 %
NEUTROPHILS # BLD AUTO: 6.6 10E9/L (ref 1.6–8.3)
NEUTROPHILS NFR BLD AUTO: 69.1 %
NRBC # BLD AUTO: 0 10*3/UL
NRBC BLD AUTO-RTO: 0 /100
PLATELET # BLD AUTO: 240 10E9/L (ref 150–450)
POTASSIUM SERPL-SCNC: 3.8 MMOL/L (ref 3.4–5.3)
PROT SERPL-MCNC: 7 G/DL (ref 6.8–8.8)
RBC # BLD AUTO: 3.7 10E12/L (ref 3.8–5.2)
SODIUM SERPL-SCNC: 145 MMOL/L (ref 133–144)
WBC # BLD AUTO: 9.6 10E9/L (ref 4–11)

## 2019-05-06 PROCEDURE — 96374 THER/PROPH/DIAG INJ IV PUSH: CPT | Performed by: PHYSICIAN ASSISTANT

## 2019-05-06 PROCEDURE — 96375 TX/PRO/DX INJ NEW DRUG ADDON: CPT | Performed by: PHYSICIAN ASSISTANT

## 2019-05-06 PROCEDURE — 25000128 H RX IP 250 OP 636: Performed by: PHYSICIAN ASSISTANT

## 2019-05-06 PROCEDURE — 99213 OFFICE O/P EST LOW 20 MIN: CPT | Performed by: PHYSICIAN ASSISTANT

## 2019-05-06 PROCEDURE — 99285 EMERGENCY DEPT VISIT HI MDM: CPT | Mod: 25 | Performed by: PHYSICIAN ASSISTANT

## 2019-05-06 PROCEDURE — 80053 COMPREHEN METABOLIC PANEL: CPT | Performed by: PHYSICIAN ASSISTANT

## 2019-05-06 PROCEDURE — 99214 OFFICE O/P EST MOD 30 MIN: CPT | Performed by: PHYSICIAN ASSISTANT

## 2019-05-06 PROCEDURE — 87506 IADNA-DNA/RNA PROBE TQ 6-11: CPT | Performed by: PHYSICIAN ASSISTANT

## 2019-05-06 PROCEDURE — 96361 HYDRATE IV INFUSION ADD-ON: CPT | Performed by: PHYSICIAN ASSISTANT

## 2019-05-06 PROCEDURE — 85025 COMPLETE CBC W/AUTO DIFF WBC: CPT | Performed by: PHYSICIAN ASSISTANT

## 2019-05-06 PROCEDURE — 99285 EMERGENCY DEPT VISIT HI MDM: CPT | Mod: Z6 | Performed by: PHYSICIAN ASSISTANT

## 2019-05-06 RX ORDER — AMOXICILLIN 875 MG
875 TABLET ORAL 2 TIMES DAILY
Qty: 20 TABLET | Refills: 0 | Status: SHIPPED | OUTPATIENT
Start: 2019-05-06 | End: 2019-05-13

## 2019-05-06 RX ORDER — SODIUM CHLORIDE 9 MG/ML
1000 INJECTION, SOLUTION INTRAVENOUS CONTINUOUS
Status: DISCONTINUED | OUTPATIENT
Start: 2019-05-06 | End: 2019-05-07 | Stop reason: HOSPADM

## 2019-05-06 RX ORDER — DIPHENHYDRAMINE HYDROCHLORIDE 50 MG/ML
25 INJECTION INTRAMUSCULAR; INTRAVENOUS ONCE
Status: COMPLETED | OUTPATIENT
Start: 2019-05-06 | End: 2019-05-06

## 2019-05-06 RX ORDER — HYDROMORPHONE HYDROCHLORIDE 1 MG/ML
0.5 INJECTION, SOLUTION INTRAMUSCULAR; INTRAVENOUS; SUBCUTANEOUS
Status: COMPLETED | OUTPATIENT
Start: 2019-05-06 | End: 2019-05-06

## 2019-05-06 RX ORDER — OXYCODONE HYDROCHLORIDE 5 MG/1
5 TABLET ORAL EVERY 8 HOURS PRN
Qty: 30 TABLET | Refills: 0 | Status: SHIPPED | OUTPATIENT
Start: 2019-05-09 | End: 2019-05-13

## 2019-05-06 RX ORDER — HYDROCORTISONE ACETATE 25 MG/1
25 SUPPOSITORY RECTAL 2 TIMES DAILY
Qty: 28 SUPPOSITORY | Refills: 0 | Status: SHIPPED | OUTPATIENT
Start: 2019-05-06 | End: 2019-05-13

## 2019-05-06 RX ADMIN — SODIUM CHLORIDE 1000 ML: 9 INJECTION, SOLUTION INTRAVENOUS at 23:34

## 2019-05-06 RX ADMIN — DIPHENHYDRAMINE HYDROCHLORIDE 25 MG: 50 INJECTION INTRAMUSCULAR; INTRAVENOUS at 23:35

## 2019-05-06 RX ADMIN — Medication 0.5 MG: at 23:39

## 2019-05-06 RX ADMIN — PROCHLORPERAZINE EDISYLATE 10 MG: 5 INJECTION INTRAMUSCULAR; INTRAVENOUS at 23:36

## 2019-05-06 ASSESSMENT — ENCOUNTER SYMPTOMS
SHORTNESS OF BREATH: 0
ADENOPATHY: 0
PHOTOPHOBIA: 0
ABDOMINAL PAIN: 1
HEADACHES: 0
FLANK PAIN: 0
DYSURIA: 0
WEAKNESS: 0
NAUSEA: 1
LIGHT-HEADEDNESS: 0
VOMITING: 1
ARTHRALGIAS: 0
SORE THROAT: 0
COUGH: 0
HEMATURIA: 0
CHEST TIGHTNESS: 0
DIFFICULTY URINATING: 0
UNEXPECTED WEIGHT CHANGE: 0
ROS GI COMMENTS: SEE HPI
NERVOUS/ANXIOUS: 1
FEVER: 0
BACK PAIN: 0

## 2019-05-06 ASSESSMENT — PAIN SCALES - GENERAL
PAINLEVEL: MODERATE PAIN (5)
PAINLEVEL: SEVERE PAIN (7)

## 2019-05-06 NOTE — PATIENT INSTRUCTIONS
Please call [phone] to schedule a barium esophagram xray.     You will receive a call from the [facility] to schedule an esophageal manometry and pH study.

## 2019-05-06 NOTE — PROGRESS NOTES
GASTROENTEROLOGY FOLLOW UP CLINIC VISIT    CC/REFERRING MD:    Aura Rosario      REASON FOR CONSULTATION:   Referred by Aura Rosario for Follow up.       HISTORY OF PRESENT ILLNESS:    Katy Islas is 29 year old female who presents for follow up.    HPI obtained through [facility] interpretor services.     She was initially seen on 4/8/19 for evaluation of hematemesis.  Please see initial note for more detail. We further evaluated with an upper endoscopy on 4/12/19 which did not reveal any signs or stigmata of bleeding.  There is a probable hiatal hernia otherwise exam was unremarkable. Biopsies were negative for H. pylori.  Her blood counts have also been noted to be stable. No signs of anemia. She continues to have significant reflux despite Protonix twice daily and Carafate several times a day.  She also continues to have nausea and vomiting which she now describes as bile. She does have hx of cholecystectomy.     She also notes bright red blood on stool and when wiping, she has had symptoms off and on for over a year.  She recently had a colonoscopy in August 2018 which did show signs of hemorrhoids and revealed decreased vascularity throughout the colon however biopsies did not show any diagnostic abnormality and were negative for microscopic, active and chronic colitis. There was no suggestion of IBD noted. (sh did have brbpr at the time of colonoscopy).       PREVIOUS ENDOSCOPY:    Upper Endoscopy 4/12/19  Impression:       - Esophagogastric landmarks identified.                             - Gastroesophageal flap valve classified as Hill Grade III (minimal fold, loose to endoscope, hiatal hernia likely).                             - Normal stomach.                             - Normal duodenal bulb, first portion of the duodenum and second portion of the duodenum.                             - Biopsies were taken with a cold forceps for Helicobacter pylori testing.                              - No findings to account for complaint of coffee ground emesis and hematemesis.       SPECIMEN(S):   Antral biopsy     FINAL DIAGNOSIS:   ANTRUM, BIOPSY:   - Gastric mucosa with no significant inflammation   - No H. pylori like organisms identified on routine staining   - Negative for intestinal metaplasia or dysplasia     I have personally reviewed all specimens and/or slides, including the listed special stains, and used them with my medical judgement to determine or confirm the final diagnosis.     Electronically signed out by:     Adrian Wagner M.D., [facility]       PROBLEM LIST  Patient Active Problem List    Diagnosis Date Noted     Bulge of cervical disc without myelopathy 04/24/2019     Priority: Medium     Cervicalgia 04/17/2019     Priority: Medium     Class 1 obesity due to excess calories without serious comorbidity with body mass index (BMI) of 32.0 to 32.9 in adult 04/03/2019     Priority: Medium     Hypophosphatemia 11/01/2018     Priority: Medium     Patellar maltracking, right 09/05/2018     Priority: Medium     Right anterior knee pain 09/05/2018     Priority: Medium     Melanocytic nevus, unspecified location 07/23/2018     Priority: Medium     Dysplastic skin lesion 07/23/2018     Priority: Medium     Iliotibial band syndrome affecting lower leg, right 12/21/2017     Priority: Medium     Chondromalacia of right patellofemoral joint 12/21/2017     Priority: Medium     Upper GI bleed - suspected 07/01/2017     Priority: Medium     Tobacco use disorder 07/01/2017     Priority: Medium     History of pseudoseizure 07/01/2017     Priority: Medium     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) 03/10/2017     Priority: Medium     Lumbar disc disease with radiculopathy 02/23/2017     Priority: Medium     S/P lumbar fusion 02/23/2017     Priority: Medium     Dr. YOVANI Millan, 2/23/17       Vitamin D deficiency 01/06/2017     Priority: Medium     Atypical mole 01/06/2017      Priority: Medium     Mild persistent asthma without complication 12/02/2016     Priority: Medium     Chronic bilateral low back pain with left-sided sciatica 12/02/2016     Priority: Medium     Bee sting allergy 09/16/2016     Priority: Medium     Gastroesophageal reflux disease without esophagitis 08/26/2016     Priority: Medium     Herpes simplex virus infection 11/12/2015     Priority: Medium     Adjustment disorder with mixed anxiety and depressed mood 10/14/2015     Priority: Medium     Marijuana abuse 02/22/2015     Priority: Medium     Bipolar disorder (H) 01/31/2013     Priority: Medium     Problem list name updated by automated process. Provider to review       Chronic pain 02/09/2012     Priority: Medium     Patient is followed by Aura Rosario PA-C for ongoing prescription of pain medication.  All refills should only be approved by this provider, or covering partner.    Medication(s): Norco    Maximum quantity per month: 70  Clinic visit frequency required: Q 2 months     Controlled substance agreement:   Discussed and signed 4/25/17    Encounter-Level CSA - 01/12/2015:                 Controlled Substance Agreement - Scan on 1/26/2015  9:14 AM : Controlled Medication Agreement-01/12/15 (below)            Pain Clinic evaluation in the past: Yes       Date/Location:   [facility], fall 2016    DIRE Total Score(s):    4/25/2017   Total Score 17       Last [facility] website verification:  done on 4/25/17 by LOVE Maki   [email or web address]           Anxiety 09/22/2011     Priority: Medium     Mild major depression (H) 08/29/2011     Priority: Medium     Mild intermittent asthma 10/12/2009     Priority: Medium     Overview:   Formatting of this note might be different from the original.  Action plan: See letter 4/10/2013   ATAQ:   Asthma Data 4/10/2013   Date completed 4/10/2013   Missed daily activities no = 0   Wake at night no = 0   Believe asthma in control yes = 0    ARIN use yes   Maximum ARIN use in 1 day 1-4 = 0   ATAQ score 0 = well controlled   Asthma ED visits in past 12 mos 0   Asthma hospitalizations in past 12 mos 0     Current Outpatient Prescriptions   Medication Sig     albuterol (PROVENTIL) 0.083 % neb solution Inhale 3 mL via a nebulizer every 4 hours if needed for Shortness Of Breath.        Crohn's colitis (H) 08/04/2009     Priority: Medium     Dysmenorrhea 05/11/2007     Priority: Medium     Benign neoplasm of skin of lower limb, including hip 03/16/2004     Priority: Medium     Migraine      Priority: Medium     Dr. Farrar - Neurology.  Now on Inderal.  Seems to be working.  Follow-up 9/08  Problem list name updated by automated process. Provider to review       Hearing loss      Priority: Medium     Congenital  Problem list name updated by automated process. Provider to review       Allergic rhinitis      Priority: Medium     Problem list name updated by automated process. Provider to review         PERTINENT PAST MEDICAL HISTORY:  (I personally reviewed this history with the patient at today's visit)   Past Medical History:   Diagnosis Date     Abdominal pain 10/31- 11/4/2005    [facility] admit for Crohn's     Allergic rhinitis, cause unspecified     Allergic rhinitis     Arthritis      C. difficile diarrhea     Past, no current diarrhea.     Crohn's disease (H)     sees Dr Summers or Ryan at [facility] in [place]     Crohn's disease (H)      Depression with anxiety 2003    Dr Bernard (psychiatry) at 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, without mention of intractable epilepsy     pseudoseizures diagnosed after extensive neurologic eval     Migraine 07/21/12    D/C  07/22/12-[facility]     Migraine, unspecified, without mention of intractable migraine without mention of status migrainosus     Migraine     Mild intermittent asthma     mild intermittent     Mycoplasma infection in conditions classified elsewhere and of unspecified site      Other chronic pain     Back pain for 6 years     Renal disease     Kidney stones     Unspecified hearing loss     congenital hearing loss         PREVIOUS SURGERIES: (I personally reviewed this history with the patient at today's visit)   Past Surgical History:   Procedure Laterality Date     AS REMOVAL OF COCCYX  10/18/2017     C FUSION OF SACROILIAC JOINT  10/26/2018     COLONOSCOPY  7/1/2009    [facility], [facility].     COMBINED ESOPHAGOSCOPY, GASTROSCOPY, DUODENOSCOPY (EGD) WITH CO2 INSUFFLATION N/A 4/12/2019    Procedure: COMBINED ESOPHAGOSCOPY, GASTROSCOPY, DUODENOSCOPY (EGD) WITH CO2 INSUFFLATION;  Surgeon: Edwin Conde MD;  Location: [facility] OR     ESOPHAGOSCOPY, GASTROSCOPY, DUODENOSCOPY (EGD), COMBINED N/A 4/12/2019    Procedure: Combined Esophagoscopy, Gastroscopy, Duodenoscopy (Egd), Biopsy Single Or Multiple;  Surgeon: dEwin Conde MD;  Location: [facility] OR     FUSION SPINE POSTERIOR MINIMALLY INVASIVE ONE LEVEL N/A 2/23/2017    Procedure: FUSION SPINE POSTERIOR MINIMALLY INVASIVE ONE LEVEL;  L4-5 Oblique Lateral Lumbar Interbody Fusion   Epidural steroid injection.   Transpedicular Bone marrow aspiration;  Surgeon: Jeniffer Eugene MD;  Location: [facility] OR     HC COLONOSCOPY THRU STOMA WITH BIOPSY  10/29/2003    Impression is that of normal appearing colonoscopy, without evidence of rectal bleeding.     HC COLONOSCOPY THRU STOMA, DIAGNOSTIC  10/00    normal     HC COLONOSCOPY THRU STOMA, DIAGNOSTIC  Oct 2009    Dr López- normal     HC EEG AWAKE AND SLEEP      abnormal     HC MRI BRAIN W/O CONTRAST  12/00    normal     HC REMOVAL GALLBLADDER  8/5/2009    [facility], [facility].     HC UGI  ENDOSCOPY DIAG W BIOPSY  11/11/09    Normal esophagus      UGI ENDOSCOPY, SIMPLE EXAM  7/00, 10/00    mild chronic esophagitis and duodenitis, neg H pylori     HC UGI ENDOSCOPY, SIMPLE EXAM  01/20/2005    Esophagogastroduodenoscopy, colonoscopy with biopsies.  [facility] UGI ENDOSCOPY, SIMPLE EXAM  7/1/2009    [facility] [place].      UGI ENDOSCOPY, SIMPLE EXAM  11/11/2009    attempted upper GI, pt. could not tolerate procedure:[facility]     ORTHOPEDIC SURGERY  October 19,2011    diskectomy L4-L5         ALLERGIES:     Allergies   Allergen Reactions     Ativan [Lorazepam] Hives     At the IV site     Baclofen      hives     Bees Hives, Swelling and Difficulty breathing     Caffeine      Contrast Dye      Hives,   Updated 5/10/2016 CT Contrast.     Iodine Hives     Methocarbamol Swelling     Metoclopramide      Other reaction(s): Tremors  LW Reaction: shaking/sweating     Midazolam      Other reaction(s): Agitation     Monosodium Glutamate      Morphine Other (See Comments)     Difficulty with urination     Nsaids Other (See Comments)     GI BLEED x2     Other (Do Not Use) Other (See Comments)     Xanaflex- pt becomes disoriented and loses bladder control     Reglan [Metoclopramide Hcl] Other (See Comments)     shaking     Soma [Carisoprodol] Visual Disturbance     Sleep walking     Tizanidine      Topamax Other (See Comments)     Topamax [Topiramate] Nausea     Tingling  GI/Vomit     Tramadol      Severe Headache, Seizure Risk     Tylenol W/Codeine [Acetaminophen-Codeine] Nausea and Itching     Tylenol 3     Valium Other (See Comments)     Becomes aggressive and angry     Versed Other (See Comments)     Zolmitriptan      Makes face feel like its twitching     Droperidol Anxiety     Flu Virus Vaccine Rash      Arm swelling       PERTINENT MEDICATIONS:    Current Outpatient Medications:      acetaminophen (TYLENOL) 500 MG tablet, Take 2 tablets (1,000 mg) by mouth  3 times daily as needed for mild pain, Disp: 100 tablet, Rfl: 0     albuterol (2.5 MG/3ML) 0.083% neb solution, Take 1 vial (2.5 mg) by nebulization every 6 hours as needed for shortness of breath / dyspnea or wheezing, Disp: 50 vial, Rfl: 11     albuterol (PROAIR HFA/PROVENTIL HFA/VENTOLIN HFA) 108 (90 Base) MCG/ACT Inhaler, Inhale 2 puffs into the lungs every 6 hours as needed for shortness of breath / dyspnea or wheezing, Disp: 3 Inhaler, Rfl: 3     amoxicillin (AMOXIL) 875 MG tablet, Take 1 tablet (875 mg) by mouth 2 times daily for 10 days, Disp: 20 tablet, Rfl: 0     ARIPiprazole (ABILIFY) 15 MG tablet, Take 1 tablet (15 mg) by mouth At Bedtime, Disp: 90 tablet, Rfl: 3     busPIRone (BUSPAR) 15 MG tablet, TK 1 T PO BID, Disp: , Rfl: 5     clonazePAM (KLONOPIN) 0.5 MG tablet, TK 1/2 TO 1 T D PRA, Disp: , Rfl: 5     cyclobenzaprine (FLEXERIL) 10 MG tablet, Take 1 tablet (10 mg) by mouth 2 times daily as needed for muscle spasms or other (back pain), Disp: 60 tablet, Rfl: 0     dicyclomine (BENTYL) 20 MG tablet, Take 1 tablet (20 mg) by mouth 4 times daily as needed (ABDOMINAL CRAMPING) AS NEEDED FOR CRAMPING, Disp: 60 tablet, Rfl: 0     diphenhydrAMINE (BENADRYL) 50 MG capsule, Take 1-2 capsules ( mg) by mouth every 6 hours as needed for itching, allergies or sleep, Disp: 90 capsule, Rfl: 3     DULoxetine (CYMBALTA) 60 MG EC capsule, Take 1 capsule (60 mg) by mouth daily, Disp: 90 capsule, Rfl: 3     EPINEPHrine (EPIPEN/ADRENACLICK/OR ANY BX GENERIC EQUIV) 0.3 MG/0.3ML injection 2-pack, Inject 0.3 mLs (0.3 mg) into the muscle once as needed for anaphylaxis, Disp: 0.6 mL, Rfl: 3     gabapentin (NEURONTIN) 300 MG capsule, Take 1-3 capsules (300-900 mg) by mouth 3 times daily, Disp: 270 capsule, Rfl: 5     levocetirizine (XYZAL) 5 MG tablet, Take 1 tablet (5 mg) by mouth every evening, Disp: 90 tablet, Rfl: 3     meclizine (ANTIVERT) 12.5 MG tablet, Take 1 tablet (12.5 mg) by mouth 4 times daily as needed for  dizziness, Disp: 30 tablet, Rfl: 1     medical cannabis (Patient's own supply), (The purpose of this order is to document that the patient reports taking medical cannabis.  This is not a prescription, and is not used to certify that the patient has a qualifying medical condition.), Disp: 0 Information only, Rfl: 0     medroxyPROGESTERone (DEPO-PROVERA) 150 MG/ML IM injection, Inject 1 mL (150 mg) into the muscle every 3 months, Disp: 3 mL, Rfl: 3     mupirocin (BACTROBAN) 2 % external ointment, Apply topically 3 times daily, Disp: , Rfl:      NONFORMULARY, Apply 30 mLs topically 4 times daily, Disp: , Rfl:      nystatin (MYCOSTATIN) cream, nystatin 100,000 unit/gram topical cream, Disp: , Rfl:      ondansetron (ZOFRAN ODT) 4 MG ODT tab, Take 1 tablet (4 mg) by mouth every 6 hours as needed for nausea, Disp: 30 tablet, Rfl: 0     [START ON 5/9/2019] oxyCODONE (ROXICODONE) 5 MG tablet, Take 1 tablet (5 mg) by mouth every 8 hours as needed for severe pain (4/day) (one week supply), Disp: 30 tablet, Rfl: 0     OXYCODONE-ACETAMINOPHEN PO, 5 mg, Disp: , Rfl:      pantoprazole (PROTONIX) 40 MG EC tablet, Take 1 tablet (40 mg) by mouth 2 times daily Take 30-60 minutes before a meal., Disp: 180 tablet, Rfl: 3     polyethylene glycol (MIRALAX/GLYCOLAX) powder, Take 17 g (1 capful) by mouth daily, Disp: 238 g, Rfl: 3     prazosin (MINIPRESS) 1 MG capsule, Take 1 mg by mouth At Bedtime, Disp: , Rfl: 5     prochlorperazine (COMPAZINE) 10 MG tablet, Take 1 tablet (10 mg) by mouth every 6 hours as needed for nausea or vomiting, Disp: 60 tablet, Rfl: 3     prochlorperazine (COMPAZINE) 25 MG suppository, Place 1 suppository (25 mg) rectally every 12 hours as needed for nausea, Disp: 12 suppository, Rfl: 3     promethazine (PHENERGAN) 25 MG tablet, Take 1 tablet (25 mg) by mouth every 6 hours as needed for nausea, Disp: 30 tablet, Rfl: 0     rizatriptan (MAXALT) 5 MG tablet, Take 1-2 tablets (5-10 mg) by mouth at onset of headache  for migraine May repeat in 2 hours. Max 6 tablets/24 hours., Disp: 18 tablet, Rfl: 3     rizatriptan (MAXALT-MLT) 5 MG ODT tab, Take 1-2 tablets (5-10 mg) by mouth at onset of headache for migraine May repeat in 2 hours. Max 6 tablets/24 hours., Disp: 18 tablet, Rfl: 3     sucralfate (CARAFATE) 1 GM tablet, Take 1 tablet (1 g) by mouth 4 times daily as needed (heartburn), Disp: 360 tablet, Rfl: 3     varenicline (CHANTIX) 1 MG tablet, Take 1 tablet (1 mg) by mouth 2 times daily, Disp: 60 tablet, Rfl: 3    SOCIAL HISTORY:  Social History     Socioeconomic History     Marital status: Single     Spouse name: Not on file     Number of children: Not on file     Years of education: Not on file     Highest education level: Not on file   Occupational History     Not on file   Social Needs     Financial resource strain: Not on file     Food insecurity:     Worry: Not on file     Inability: Not on file     Transportation needs:     Medical: Not on file     Non-medical: Not on file   Tobacco Use     Smoking status: Current Every Day Smoker     Packs/day: 0.50     Years: 5.00     Pack years: 2.50     Types: Cigarettes     Smokeless tobacco: Never Used     Tobacco comment: chantix   Substance and Sexual Activity     Alcohol use: Not Currently     Comment: Not since last July     Drug use: Yes     Types: Marijuana     Comment: Medical Marijuana currently     Sexual activity: Not Currently     Partners: Male     Birth control/protection: Condom, Injection   Lifestyle     Physical activity:     Days per week: Not on file     Minutes per session: Not on file     Stress: Not on file   Relationships     Social connections:     Talks on phone: Not on file     Gets together: Not on file     Attends Church service: Not on file     Active member of club or organization: Not on file     Attends meetings of clubs or organizations: Not on file     Relationship status: Not on file     Intimate partner violence:     Fear of current or ex  partner: Not on file     Emotionally abused: Not on file     Physically abused: Not on file     Forced sexual activity: Not on file   Other Topics Concern      Service No     Blood Transfusions No     Caffeine Concern No     Occupational Exposure No     Hobby Hazards No     Sleep Concern No     Stress Concern No     Weight Concern No     Special Diet No     Back Care No     Exercise Yes     Bike Helmet No     Seat Belt Yes     Self-Exams Yes     Parent/sibling w/ CABG, MI or angioplasty before 65F 55M? Yes     Comment: father late 30's early 40's    Social History Narrative     Not on file       FAMILY HISTORY: (I personally reviewed this history with the patient at today's visit)  Family History   Problem Relation Age of Onset     Gastrointestinal Disease Brother         severe Crohn's     Neurologic Disorder Brother         Seizures post head injury     Depression Brother      Substance Abuse Brother      Genitourinary Problems Father         kidney stones     Diabetes Father      Heart Disease Father         Open heart surgery     Breast Cancer Maternal Grandmother      Parkinsonism Maternal Grandmother      Cerebrovascular Disease Paternal Grandmother      Cancer Maternal Grandfather         Lung     Cardiovascular Paternal Grandfather         Heart Attack     Substance Abuse Mother      Lung Cancer Paternal Uncle      Cancer Paternal Uncle      Lung Cancer Maternal Uncle           Review of Systems   Constitutional: Negative for fever and unexpected weight change.   HENT: Negative for congestion and sore throat.    Eyes: Negative for photophobia and visual disturbance.   Respiratory: Negative for cough, chest tightness and shortness of breath.    Cardiovascular: Negative for chest pain and leg swelling.   Gastrointestinal: Positive for abdominal pain, nausea and vomiting.        See HPI   Endocrine: Negative for cold intolerance and heat intolerance.   Genitourinary: Negative for difficulty urinating,  dysuria, flank pain and hematuria.   Musculoskeletal: Negative for arthralgias and back pain.   Skin: Negative for pallor and rash.   Allergic/Immunologic: Negative for immunocompromised state.   Neurological: Negative for weakness, light-headedness and headaches.   Hematological: Negative for adenopathy.   Psychiatric/Behavioral: The patient is nervous/anxious.        PHYSICAL EXAMINATION:  Constitutional: aaox3, cooperative, pleasant, not dyspneic/diaphoretic, no acute distress  Vitals reviewed: /79 (BP Location: Left arm, Patient Position: Sitting, Cuff Size: Adult Regular)   Pulse 110   Wt 84.9 kg (187 lb 1.6 oz)   LMP  (LMP Unknown)   SpO2 99%   BMI 34.21 kg/m     Wt:   Wt Readings from Last 2 Encounters:   05/06/19 84.9 kg (187 lb 1.6 oz)   04/30/19 84.7 kg (186 lb 12 oz)        Physical Exam   Constitutional: She is oriented to person, place, and time. She appears well-developed and well-nourished.   HENT:   Head: Normocephalic and atraumatic.   Nose: Nose normal.   Mouth/Throat: No oropharyngeal exudate.   Eyes: Pupils are equal, round, and reactive to light. Conjunctivae and EOM are normal. Right eye exhibits no discharge. Left eye exhibits no discharge. No scleral icterus.   Neck: Normal range of motion.   Abdominal: Soft. Bowel sounds are normal. She exhibits no distension and no mass. There is no tenderness. There is no rebound and no guarding.   Musculoskeletal: Normal range of motion.   Neurological: She is alert and oriented to person, place, and time.   Skin: Skin is warm and dry. She is not diaphoretic. No erythema. No pallor.   Psychiatric: Her mood appears anxious.   Nursing note and vitals reviewed.          PERTINENT STUDIES: (I personally reviewed these laboratory studies today)  Most recent CBC:  Recent Labs   Lab Test 04/03/19  1014 02/11/19  2140   WBC 6.4 7.7   HGB 13.8 13.8   HCT 40.4 40.2    286     Most recent hepatic panel:  Recent Labs   Lab Test 04/06/19 04/02/19    ALT 67 17   AST 16 19     Most recent creatinine:  Recent Labs   Lab Test 04/06/19 04/02/19   CR 0.88 0.90       TSH   Date Value Ref Range Status   02/20/2018 0.92 0.40 - 4.00 mU/L Final         RADIOLOGY:   ABDOMEN TWO-THREE VIEW  12/10/2018 9:02 PM       HISTORY: Mid abdominal pain.      COMPARISON: October 23, 2017     FINDINGS: Small amount of stool. No free air. There are no air filled  distended loops of small bowel. The colon is not distended. The lung  bases are unremarkable.                                                                      IMPRESSION: Nonobstructed bowel gas pattern.     MACHO WADE MD    CT ABDOMEN PELVIS WITHOUT CONTRAST   12/9/2018 8:16 PM      HISTORY: Abdominal pain.     TECHNIQUE: Volumetric helical sections were acquired from the lung  bases through the ischial tuberosities without IV contrast. Coronal  images were also reconstructed. Radiation dose for this scan was  reduced using automated exposure control, adjustment of the mA and/or  kV according to patient size, or iterative reconstruction technique.     COMPARISON: CT of the abdomen and pelvis without IV contrast performed  11/20/2018.     FINDINGS: No bowel obstruction. No convincing evidence for colitis or  diverticulitis. There is a trace amount of nonspecific free fluid in  the pelvis. Unremarkable appendix. Prior cholecystectomy. There are a  few nonobstructing stones in both kidneys, measuring 0.3 cm or  smaller, unchanged. No ureteral calculi or hydronephrosis. The liver,  spleen, adrenal glands, pancreas, and kidneys have otherwise  unremarkable noncontrast appearances. The visualized lung bases are  clear. Postoperative changes of posterior marilyn and pedicle screw fusion  are noted at L4-5. There are also surgical screws crossing the left  sacroiliac joint.                                                                      IMPRESSION:   1. No acute abnormality in the abdomen or pelvis. No definite cause  for  abdominal pain is identified.  2. Scattered small nonobstructing stones in both kidneys are  unchanged.     MACRELINA HALL MD      CT ABDOMEN AND PELVIS WITHOUT CONTRAST   11/20/2018 6:10 PM      HISTORY: Left lower quadrant pain and bloody stools.     TECHNIQUE: No IV contrast material. Radiation dose for this scan was  reduced using automated exposure control, adjustment of the mA and/or  kV according to patient size, or iterative reconstruction technique.     COMPARISON: None.     FINDINGS:    Abdomen: Cholecystectomy. Normal appearance of the liver, spleen,  pancreas and adrenal glands. There are multiple tiny calculi or  pre-calculi in the renal collecting systems or medullary pyramids with  no evidence of hydronephrosis. No ureteral dilatation. No free air,  free fluid or adenopathy. Normal-appearing stomach, small bowel, colon  and appendix. No hernia. No colonic diverticulosis or diverticulitis.     Pelvis: Postoperative changes in the lower lumbar spine from fusion  surgery. The bladder, uterus and ovaries appear normal.                                                                      IMPRESSION:  1. No specific etiology seen for the left lower quadrant pain and  bloody stools.  2. Multiple tiny calculi or pre-calculi in the renal collecting system  or medullary pyramids. No hydronephrosis.     IMAN ALCANTARA MD        ASSESSMENT/PLAN:    Katy Islas is a 29 year old female who presents for follow up.     She was initially seen on 4/8/2019 for further evaluation of hematemesis.  We further evaluated with an upper endoscopy which did not reveal any signs of bleeding and besides a probable hiatal hernia was unremarkable.  She continues to have significant reflux as well as nausea and vomiting.  She states she has had her gallbladder removed previously. We will further evaluate with esophageal manometry and pH impedance testing as well as a barium esophagram to characterize her hiatal hernia.   Advised patient to continue Protonix twice daily and Carafate as she is currently doing.    She notes some brbpr when wiping and coating the stools. She did have a recent colonoscopy in August 2018 (she was having symptoms at that time as well) which showed hemorrhoids and decreased vascularity in the colon, without signs of active IBD. We will attempt to treat her hemorrhoids with anusol hemorrhoidal suppository. Her blood count is stable at this time. We can consider repeat colonoscopy if symptoms do not improve.       Gastroesophageal reflux disease without esophagitis  Nausea and vomiting, intractability of vomiting not specified, unspecified vomiting type  Hiatal hernia  Internal hemorrhoids  - hydrocortisone (ANUSOL-HC) 25 MG suppository  Dispense: 28 suppository; Refill: 0      Orders Placed This Encounter   Procedures     XR Esophagram     GASTROENTEROLOGY ADULT REF PROCEDURE ONLY [facility] [phone]           Follow up after procedures.     Thank you for this consultation.  It was a pleasure to participate in the care of this patient; please contact us with any further questions.  A total of 25 minutes, face to face, was spent with this patient, >50% of which was counseling regarding the above delineated issues.            This note was created with voice recognition software, and while reviewed for accuracy, typos may remain.     Junaid Pritchett PA-C  Gastroenterology  [facility]

## 2019-05-06 NOTE — PROGRESS NOTES
SUBJECTIVE:   Katy Islas is a 29 year old female who presents to clinic today for the following health issues:    HPI  Acute Illness   Acute illness concerns: URI  Onset: Yesterday     Fever: no    Chills/Sweats: YES    Headache (location?): YES    Sinus Pressure:YES    Conjunctivitis:  YES: both    Ear Pain: YES: right    Rhinorrhea: YES    Congestion: YES    Sore Throat: YES     Cough: YES-productive of green sputum    Wheeze: YES    Decreased Appetite: YES    Nausea: YES    Vomiting: YES    Diarrhea:  YES    Dysuria/Freq.: no     Fatigue/Achiness: YES    Sick/Strep Exposure: no      Therapies Tried and outcome: inhaler and nausea medicine          Additional history: as documented    Reviewed and updated as needed this visit by clinical staff  Tobacco  Allergies  Meds  Med Hx  Surg Hx  Fam Hx  Soc Hx        Reviewed and updated as needed this visit by Provider  Allergies  Meds  Problems         ROS:  Constitutional, HEENT, cardiovascular, pulmonary, gi and gu systems are negative, except as otherwise noted.    OBJECTIVE:   /80   Pulse 90   Temp 97  F (36.1  C) (Temporal)   Resp 20   LMP  (LMP Unknown)   SpO2 96%   There is no height or weight on file to calculate BMI.  GENERAL APPEARANCE: ill appearing, alert and no distress  EYES: Eyes grossly normal to inspection, PERRLA, conjunctivae and sclerae without injection or discharge, EOM intact   HENT: Bilateral ear canals without erythema or cerumen, bilateral TM's erythematous with purulent effusion and injection, no bulging, nasal turbinates without swelling or erythema, clear nasal discharge, mouth without ulcers or lesions, oropharynx clear and oral mucous membranes moist, no sinus tenderness   NECK: No adenopathy in cervical or supraclavicular regions  RESP: Lungs clear to auscultation - no rales, rhonchi or wheezes   CV: Regular rates and rhythm, normal S1 S2, no S3 or S4, no murmur, click or rub  MS: No musculoskeletal defects are  noted and gait is age appropriate without ataxia   SKIN: No suspicious lesions or rashes, hydration status appears adeuqate with normal skin turgor   PSYCH: Alert and oriented x3; speech- coherent , normal rate and volume; able to articulate logical thoughts, able to abstract reason, no tangential thoughts, no hallucinations or delusions, mentation appears normal, Mood is euthymic. Affect is appropriate for this mood state and bright. Thought content is free of suicidal ideation, hallucinations, and delusions. Dress is adequate and upkept. Eye contact is good during conversation.       Diagnostic Test Results:  none     ASSESSMENT/PLAN:       ICD-10-CM    1. Acute suppurative otitis media of both ears without spontaneous rupture of tympanic membranes, recurrence not specified H66.003 amoxicillin (AMOXIL) 875 MG tablet   2. Mild intermittent asthma with acute exacerbation J45.21    3. Chronic pain syndrome G89.4 oxyCODONE (ROXICODONE) 5 MG tablet   4. Bulge of cervical disc without myelopathy M50.20 oxyCODONE (ROXICODONE) 5 MG tablet     - No interpretor available as patient was a walk-in, communicated through basic ASL and lip reading       1. & 2.   - Discussed exam findings  - Will start Amoxicillin   - Discussed antibiotic use, duration, and side effects  - For cough and asthma, no signs of pneumonia on exam, but recommend scheduled albuterol     Albuterol 2 puffs - four times a day for 1 week      Then return to as needed   - Patient requesting Robitussin AC, this was declined as she is on pain medication      Recommend OTC Delsym   - Encouraged rest and fluids     3 & 4.   - Not discussed, due for weekly fill on Thursday 5/9/19, gave RX dated as such   - Encouraged patient to attend physical therapy     The patient indicates understanding of these issues and agrees with the plan.    Follow up: 1 week for pain recheck         Aura Rosario PA-C  [facility]

## 2019-05-06 NOTE — PATIENT INSTRUCTIONS
1. Albuterol 2 puffs - four times a day for 1 week     2. Amoxicillin - twice a day for 10 days     3. Consider Delsym cough medication

## 2019-05-06 NOTE — TELEPHONE ENCOUNTER
Prior Authorization Retail Medication Request    Medication/Dose: Anucort- HC 25MG Rectal Supp 24's  ICD code (if different than what is on RX):    Previously Tried and Failed:    Rationale:      Insurance Name:    Insurance ID:        Pharmacy Information (if different than what is on RX)  Name:    Phone:

## 2019-05-06 NOTE — ED AVS SNAPSHOT
[facility] Emergency Department  [address] [place] [address]  Phone:  [phone]  Fax:  [phone]                                    Katy Islas   MRN: [id number]    Department:  [facility] Emergency Department   Date of Visit:  5/6/2019           After Visit Summary Signature Page    I have received my discharge instructions, and my questions have been answered. I have discussed any challenges I see with this plan with the nurse or doctor.    ..........................................................................................................................................  Patient/Patient Representative Signature      ..........................................................................................................................................  Patient Representative Print Name and Relationship to Patient    ..................................................               ................................................  Date                                   Time    ..........................................................................................................................................  Reviewed by Signature/Title    ...................................................              ..............................................  Date                                               Time          22EPIC Rev 08/18

## 2019-05-06 NOTE — NURSING NOTE
Katy Islas's goals for this visit include:   Chief Complaint   Patient presents with     RECHECK     4 week F/U EGD     She requests these members of her care team be copied on today's visit information: Yes    PCP: Aura Rosario    Referring Provider:  No referring provider defined for this encounter.    /79 (BP Location: Left arm, Patient Position: Sitting, Cuff Size: Adult Regular)   Pulse 110   Wt 84.9 kg (187 lb 1.6 oz)   LMP  (LMP Unknown)   SpO2 99%   BMI 34.21 kg/m      Do you need any medication refills at today's visit? No    Cathy Benitez LPN

## 2019-05-07 ENCOUNTER — TELEPHONE (OUTPATIENT)
Dept: FAMILY MEDICINE | Facility: OTHER | Age: 29
End: 2019-05-07

## 2019-05-07 ENCOUNTER — HOSPITAL ENCOUNTER (EMERGENCY)
Facility: CLINIC | Age: 29
Discharge: HOME | End: 2019-05-07
Attending: EMERGENCY MEDICINE | Admitting: EMERGENCY MEDICINE
Payer: MEDICARE

## 2019-05-07 ENCOUNTER — APPOINTMENT (OUTPATIENT)
Dept: CT IMAGING | Facility: CLINIC | Age: 29
End: 2019-05-07
Attending: EMERGENCY MEDICINE
Payer: MEDICARE

## 2019-05-07 VITALS
HEART RATE: 88 BPM | TEMPERATURE: 99.1 F | RESPIRATION RATE: 20 BRPM | DIASTOLIC BLOOD PRESSURE: 71 MMHG | SYSTOLIC BLOOD PRESSURE: 117 MMHG | BODY MASS INDEX: 32.91 KG/M2 | WEIGHT: 180 LBS | OXYGEN SATURATION: 94 %

## 2019-05-07 VITALS
SYSTOLIC BLOOD PRESSURE: 150 MMHG | TEMPERATURE: 99.2 F | RESPIRATION RATE: 18 BRPM | WEIGHT: 185 LBS | HEART RATE: 90 BPM | OXYGEN SATURATION: 98 % | DIASTOLIC BLOOD PRESSURE: 100 MMHG | BODY MASS INDEX: 33.83 KG/M2

## 2019-05-07 DIAGNOSIS — R19.7 DIARRHEA, UNSPECIFIED TYPE: ICD-10-CM

## 2019-05-07 LAB
ALBUMIN SERPL-MCNC: 3.6 G/DL (ref 3.4–5)
ALP SERPL-CCNC: 92 U/L (ref 40–150)
ALT SERPL W P-5'-P-CCNC: 30 U/L (ref 0–50)
ANION GAP SERPL CALCULATED.3IONS-SCNC: 6 MMOL/L (ref 3–14)
AST SERPL W P-5'-P-CCNC: 14 U/L (ref 0–45)
BASOPHILS # BLD AUTO: 0 10E9/L (ref 0–0.2)
BASOPHILS NFR BLD AUTO: 0.2 %
BILIRUB SERPL-MCNC: 0.2 MG/DL (ref 0.2–1.3)
BUN SERPL-MCNC: 19 MG/DL (ref 7–30)
C COLI+JEJUNI+LARI FUSA STL QL NAA+PROBE: NOT DETECTED
CALCIUM SERPL-MCNC: 8.8 MG/DL (ref 8.5–10.1)
CHLORIDE SERPL-SCNC: 109 MMOL/L (ref 94–109)
CO2 SERPL-SCNC: 26 MMOL/L (ref 20–32)
CREAT SERPL-MCNC: 0.77 MG/DL (ref 0.52–1.04)
DIFFERENTIAL METHOD BLD: ABNORMAL
EC STX1 GENE STL QL NAA+PROBE: NOT DETECTED
EC STX2 GENE STL QL NAA+PROBE: NOT DETECTED
ENTERIC PATHOGEN COMMENT: NORMAL
EOSINOPHIL NFR BLD AUTO: 0.6 %
ERYTHROCYTE [DISTWIDTH] IN BLOOD BY AUTOMATED COUNT: 12 % (ref 10–15)
FLUAV+FLUBV AG SPEC QL: NEGATIVE
FLUAV+FLUBV AG SPEC QL: NEGATIVE
GFR SERPL CREATININE-BSD FRML MDRD: >90 ML/MIN/{1.73_M2}
GLUCOSE SERPL-MCNC: 97 MG/DL (ref 70–99)
HCT VFR BLD AUTO: 38.2 % (ref 35–47)
HGB BLD-MCNC: 12.9 G/DL (ref 11.7–15.7)
IMM GRANULOCYTES # BLD: 0.1 10E9/L (ref 0–0.4)
IMM GRANULOCYTES NFR BLD: 0.5 %
LYMPHOCYTES # BLD AUTO: 1.1 10E9/L (ref 0.8–5.3)
LYMPHOCYTES NFR BLD AUTO: 11.3 %
MCH RBC QN AUTO: 33.2 PG (ref 26.5–33)
MCHC RBC AUTO-ENTMCNC: 33.8 G/DL (ref 31.5–36.5)
MCV RBC AUTO: 98 FL (ref 78–100)
MONOCYTES # BLD AUTO: 1 10E9/L (ref 0–1.3)
MONOCYTES NFR BLD AUTO: 10.5 %
NEUTROPHILS # BLD AUTO: 7.2 10E9/L (ref 1.6–8.3)
NEUTROPHILS NFR BLD AUTO: 76.9 %
NOROV GI+II ORF1-ORF2 JNC STL QL NAA+PR: NOT DETECTED
NRBC # BLD AUTO: 0 10*3/UL
NRBC BLD AUTO-RTO: 0 /100
PLATELET # BLD AUTO: 263 10E9/L (ref 150–450)
POTASSIUM SERPL-SCNC: 4.3 MMOL/L (ref 3.4–5.3)
PROT SERPL-MCNC: 7.5 G/DL (ref 6.8–8.8)
RBC # BLD AUTO: 3.89 10E12/L (ref 3.8–5.2)
RVA NSP5 STL QL NAA+PROBE: NOT DETECTED
SALMONELLA SP RPOD STL QL NAA+PROBE: NOT DETECTED
SHIGELLA SP+EIEC IPAH STL QL NAA+PROBE: NOT DETECTED
SODIUM SERPL-SCNC: 141 MMOL/L (ref 133–144)
SPECIMEN SOURCE: NORMAL
V CHOL+PARA RFBL+TRKH+TNAA STL QL NAA+PR: NOT DETECTED
WBC # BLD AUTO: 9.4 10E9/L (ref 4–11)
Y ENTERO RECN STL QL NAA+PROBE: NOT DETECTED

## 2019-05-07 PROCEDURE — 80053 COMPREHEN METABOLIC PANEL: CPT | Performed by: EMERGENCY MEDICINE

## 2019-05-07 PROCEDURE — 25000128 H RX IP 250 OP 636: Performed by: EMERGENCY MEDICINE

## 2019-05-07 PROCEDURE — 25000128 H RX IP 250 OP 636: Performed by: PHYSICIAN ASSISTANT

## 2019-05-07 PROCEDURE — 96375 TX/PRO/DX INJ NEW DRUG ADDON: CPT | Performed by: PHYSICIAN ASSISTANT

## 2019-05-07 PROCEDURE — 96361 HYDRATE IV INFUSION ADD-ON: CPT | Performed by: EMERGENCY MEDICINE

## 2019-05-07 PROCEDURE — 96374 THER/PROPH/DIAG INJ IV PUSH: CPT | Mod: 59 | Performed by: EMERGENCY MEDICINE

## 2019-05-07 PROCEDURE — 96375 TX/PRO/DX INJ NEW DRUG ADDON: CPT | Performed by: EMERGENCY MEDICINE

## 2019-05-07 PROCEDURE — 99285 EMERGENCY DEPT VISIT HI MDM: CPT | Mod: 25,27 | Performed by: EMERGENCY MEDICINE

## 2019-05-07 PROCEDURE — 85025 COMPLETE CBC W/AUTO DIFF WBC: CPT | Performed by: EMERGENCY MEDICINE

## 2019-05-07 PROCEDURE — 99285 EMERGENCY DEPT VISIT HI MDM: CPT | Mod: Z6 | Performed by: EMERGENCY MEDICINE

## 2019-05-07 PROCEDURE — 87804 INFLUENZA ASSAY W/OPTIC: CPT | Mod: 59 | Performed by: PHYSICIAN ASSISTANT

## 2019-05-07 PROCEDURE — 74176 CT ABD & PELVIS W/O CONTRAST: CPT

## 2019-05-07 RX ORDER — DIPHENHYDRAMINE HYDROCHLORIDE 50 MG/ML
25 INJECTION INTRAMUSCULAR; INTRAVENOUS ONCE
Status: COMPLETED | OUTPATIENT
Start: 2019-05-07 | End: 2019-05-07

## 2019-05-07 RX ORDER — KETOROLAC TROMETHAMINE 30 MG/ML
30 INJECTION, SOLUTION INTRAMUSCULAR; INTRAVENOUS ONCE
Status: COMPLETED | OUTPATIENT
Start: 2019-05-07 | End: 2019-05-07

## 2019-05-07 RX ORDER — ONDANSETRON 2 MG/ML
4 INJECTION INTRAMUSCULAR; INTRAVENOUS EVERY 30 MIN PRN
Status: DISCONTINUED | OUTPATIENT
Start: 2019-05-07 | End: 2019-05-07 | Stop reason: HOSPADM

## 2019-05-07 RX ORDER — SODIUM CHLORIDE 9 MG/ML
1000 INJECTION, SOLUTION INTRAVENOUS CONTINUOUS
Status: DISCONTINUED | OUTPATIENT
Start: 2019-05-07 | End: 2019-05-07 | Stop reason: HOSPADM

## 2019-05-07 RX ORDER — HYDROMORPHONE HYDROCHLORIDE 1 MG/ML
0.5 INJECTION, SOLUTION INTRAMUSCULAR; INTRAVENOUS; SUBCUTANEOUS
Status: DISCONTINUED | OUTPATIENT
Start: 2019-05-07 | End: 2019-05-07 | Stop reason: HOSPADM

## 2019-05-07 RX ADMIN — DIPHENHYDRAMINE HYDROCHLORIDE 25 MG: 50 INJECTION INTRAMUSCULAR; INTRAVENOUS at 15:01

## 2019-05-07 RX ADMIN — ONDANSETRON 4 MG: 2 INJECTION INTRAMUSCULAR; INTRAVENOUS at 00:04

## 2019-05-07 RX ADMIN — ONDANSETRON 4 MG: 2 INJECTION INTRAMUSCULAR; INTRAVENOUS at 16:10

## 2019-05-07 RX ADMIN — SODIUM CHLORIDE 1000 ML: 9 INJECTION, SOLUTION INTRAVENOUS at 14:54

## 2019-05-07 RX ADMIN — Medication 0.5 MG: at 15:10

## 2019-05-07 RX ADMIN — KETOROLAC TROMETHAMINE 30 MG: 30 INJECTION INTRAMUSCULAR; INTRAVENOUS at 00:31

## 2019-05-07 RX ADMIN — PROCHLORPERAZINE EDISYLATE 10 MG: 5 INJECTION INTRAMUSCULAR; INTRAVENOUS at 15:03

## 2019-05-07 NOTE — ED PROVIDER NOTES
History     Chief Complaint   Patient presents with     Otalgia     Diarrhea     HPI  Katy Islas is a 29 year old female who presents for evaluation of URI symptoms for the past 2 days including rhinorrhea, congestion, otalgia, sore throat, and a deep cough that is occasionally productive of green sputum.  Patient started experiencing diarrhea early this evening and states that she has had 20 bowel movements since onset.  She has generalized abdominal discomfort but no blood or pus in the stool.  Does report a history of Crohn's disease in the past.  Attempted treatment with Zofran, Imodium, and Maalox at home with limited improvement.   Was seen by her PCP earlier today and diagnosed with bilateral acute otitis media.  Prescribed amoxicillin, but the patient states that she has not picked this up yet.        Allergies:  Allergies   Allergen Reactions     Ativan [Lorazepam] Hives     At the IV site     Baclofen      hives     Bees Hives, Swelling and Difficulty breathing     Caffeine      Contrast Dye      Hives,   Updated 5/10/2016 CT Contrast.     Iodine Hives     Methocarbamol Swelling     Metoclopramide      Other reaction(s): Tremors  LW Reaction: shaking/sweating     Midazolam      Other reaction(s): Agitation     Monosodium Glutamate      Morphine Other (See Comments)     Difficulty with urination     Nsaids Other (See Comments)     GI BLEED x2     Other (Do Not Use) Other (See Comments)     Xanaflex- pt becomes disoriented and loses bladder control     Reglan [Metoclopramide Hcl] Other (See Comments)     shaking     Soma [Carisoprodol] Visual Disturbance     Sleep walking     Tizanidine      Topamax Other (See Comments)     Topamax [Topiramate] Nausea     Tingling  GI/Vomit     Tramadol      Severe Headache, Seizure Risk     Tylenol W/Codeine [Acetaminophen-Codeine] Nausea and Itching     Tylenol 3     Valium Other (See Comments)     Becomes aggressive and angry     Versed Other (See Comments)      Zolmitriptan      Makes face feel like its twitching     Droperidol Anxiety     Flu Virus Vaccine Rash      Arm swelling       Problem List:    Patient Active Problem List    Diagnosis Date Noted     Bulge of cervical disc without myelopathy 04/24/2019     Priority: Medium     Cervicalgia 04/17/2019     Priority: Medium     Class 1 obesity due to excess calories without serious comorbidity with body mass index (BMI) of 32.0 to 32.9 in adult 04/03/2019     Priority: Medium     Hypophosphatemia 11/01/2018     Priority: Medium     Patellar maltracking, right 09/05/2018     Priority: Medium     Right anterior knee pain 09/05/2018     Priority: Medium     Melanocytic nevus, unspecified location 07/23/2018     Priority: Medium     Dysplastic skin lesion 07/23/2018     Priority: Medium     Iliotibial band syndrome affecting lower leg, right 12/21/2017     Priority: Medium     Chondromalacia of right patellofemoral joint 12/21/2017     Priority: Medium     Upper GI bleed - suspected 07/01/2017     Priority: Medium     Tobacco use disorder 07/01/2017     Priority: Medium     History of pseudoseizure 07/01/2017     Priority: Medium     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) 03/10/2017     Priority: Medium     Lumbar disc disease with radiculopathy 02/23/2017     Priority: Medium     S/P lumbar fusion 02/23/2017     Priority: Medium     Dr. YOVANI Millan, 2/23/17       Vitamin D deficiency 01/06/2017     Priority: Medium     Atypical mole 01/06/2017     Priority: Medium     Mild persistent asthma without complication 12/02/2016     Priority: Medium     Chronic bilateral low back pain with left-sided sciatica 12/02/2016     Priority: Medium     Bee sting allergy 09/16/2016     Priority: Medium     Gastroesophageal reflux disease without esophagitis 08/26/2016     Priority: Medium     Herpes simplex virus infection 11/12/2015     Priority: Medium     Adjustment disorder with mixed anxiety and depressed mood 10/14/2015      Priority: Medium     Marijuana abuse 02/22/2015     Priority: Medium     Bipolar disorder (H) 01/31/2013     Priority: Medium     Problem list name updated by automated process. Provider to review       Chronic pain 02/09/2012     Priority: Medium     Patient is followed by Aura Rosario PA-C for ongoing prescription of pain medication.  All refills should only be approved by this provider, or covering partner.    Medication(s): Norco    Maximum quantity per month: 70  Clinic visit frequency required: Q 2 months     Controlled substance agreement:   Discussed and signed 4/25/17    Encounter-Level CSA - 01/12/2015:                 Controlled Substance Agreement - Scan on 1/26/2015  9:14 AM : Controlled Medication Agreement-01/12/15 (below)            Pain Clinic evaluation in the past: Yes       Date/Location:   [facility], fall 2016    DIRE Total Score(s):    4/25/2017   Total Score 17       Last [facility] website verification:  done on 4/25/17 by LOVE Maki   [email or web address]           Anxiety 09/22/2011     Priority: Medium     Mild major depression (H) 08/29/2011     Priority: Medium     Mild intermittent asthma 10/12/2009     Priority: Medium     Overview:   Formatting of this note might be different from the original.  Action plan: See letter 4/10/2013   ATAQ:   Asthma Data 4/10/2013   Date completed 4/10/2013   Missed daily activities no = 0   Wake at night no = 0   Believe asthma in control yes = 0   ARIN use yes   Maximum ARIN use in 1 day 1-4 = 0   ATAQ score 0 = well controlled   Asthma ED visits in past 12 mos 0   Asthma hospitalizations in past 12 mos 0     Current Outpatient Prescriptions   Medication Sig     albuterol (PROVENTIL) 0.083 % neb solution Inhale 3 mL via a nebulizer every 4 hours if needed for Shortness Of Breath.        Crohn's colitis (H) 08/04/2009     Priority: Medium     Dysmenorrhea 05/11/2007     Priority: Medium     Benign neoplasm of skin of  lower limb, including hip 03/16/2004     Priority: Medium     Migraine      Priority: Medium     Dr. Farrar - Neurology.  Now on Inderal.  Seems to be working.  Follow-up 9/08  Problem list name updated by automated process. Provider to review       Hearing loss      Priority: Medium     Congenital  Problem list name updated by automated process. Provider to review       Allergic rhinitis      Priority: Medium     Problem list name updated by automated process. Provider to review          Past Medical History:    Past Medical History:   Diagnosis Date     Abdominal pain 10/31- 11/4/2005     Allergic rhinitis, cause unspecified      Arthritis      C. 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, without mention of intractable epilepsy      Migraine 07/21/12     Migraine, unspecified, without mention of intractable migraine without mention of status migrainosus      Mild intermittent asthma      Mycoplasma infection in conditions classified elsewhere and of unspecified site      Other chronic pain      Renal disease      Unspecified hearing loss        Past Surgical History:    Past Surgical History:   Procedure Laterality Date     AS REMOVAL OF COCCYX  10/18/2017     C FUSION OF SACROILIAC JOINT  10/26/2018     COLONOSCOPY  7/1/2009    [facility].     COMBINED ESOPHAGOSCOPY, GASTROSCOPY, DUODENOSCOPY (EGD) WITH CO2 INSUFFLATION N/A 4/12/2019    Procedure: COMBINED ESOPHAGOSCOPY, GASTROSCOPY, DUODENOSCOPY (EGD) WITH CO2 INSUFFLATION;  Surgeon: Edwin Conde MD;  Location:  OR     ESOPHAGOSCOPY, GASTROSCOPY, DUODENOSCOPY (EGD), COMBINED N/A 4/12/2019    Procedure: Combined Esophagoscopy,  Gastroscopy, Duodenoscopy (Egd), Biopsy Single Or Multiple;  Surgeon: Edwin Conde MD;  Location: [facility] OR     FUSION SPINE POSTERIOR MINIMALLY INVASIVE ONE LEVEL N/A 2/23/2017    Procedure: FUSION SPINE POSTERIOR MINIMALLY INVASIVE ONE LEVEL;  L4-5 Oblique Lateral Lumbar Interbody Fusion   Epidural steroid injection.   Transpedicular Bone marrow aspiration;  Surgeon: Jeniffer Eugene MD;  Location: [facility] OR     HC COLONOSCOPY THRU STOMA WITH BIOPSY  10/29/2003    Impression is that of normal appearing colonoscopy, without evidence of rectal bleeding.     HC COLONOSCOPY THRU STOMA, DIAGNOSTIC  10/00    normal     HC COLONOSCOPY THRU STOMA, DIAGNOSTIC  Oct 2009    Dr López- normal     HC EEG AWAKE AND SLEEP      abnormal     HC MRI BRAIN W/O CONTRAST  12/00    normal     HC REMOVAL GALLBLADDER  8/5/2009    [facility].     HC UGI ENDOSCOPY DIAG W BIOPSY  11/11/09    Normal esophagus     HC UGI ENDOSCOPY, SIMPLE EXAM  7/00, 10/00    mild chronic esophagitis and duodenitis, neg H pylori     HC UGI ENDOSCOPY, SIMPLE EXAM  01/20/2005    Esophagogastroduodenoscopy, colonoscopy with biopsies.  [facility] UGI ENDOSCOPY, SIMPLE EXAM  7/1/2009    [facility].      UGI ENDOSCOPY, SIMPLE EXAM  11/11/2009    attempted upper GI, pt. could not tolerate procedure:[facility]     ORTHOPEDIC SURGERY  October 19,2011    diskectomy L4-L5       Family History:    Family History   Problem Relation Age of Onset     Gastrointestinal Disease Brother         severe Crohn's     Neurologic Disorder Brother         Seizures post head injury     Depression Brother      Substance Abuse Brother      Genitourinary Problems Father         kidney stones     Diabetes Father      Heart Disease Father         Open heart surgery     Breast Cancer Maternal Grandmother      Parkinsonism Maternal Grandmother      Cerebrovascular Disease Paternal Grandmother      Cancer  Maternal Grandfather         Lung     Cardiovascular Paternal Grandfather         Heart Attack     Substance Abuse Mother      Lung Cancer Paternal Uncle      Cancer Paternal Uncle      Lung Cancer Maternal Uncle        Social History:  Marital Status:  Single [1]  Social History     Tobacco Use     Smoking status: Current Every Day Smoker     Packs/day: 0.50     Years: 5.00     Pack years: 2.50     Types: Cigarettes     Smokeless tobacco: Never Used     Tobacco comment: chantix   Substance Use Topics     Alcohol use: Not Currently     Comment: Not since last July     Drug use: Yes     Types: Marijuana     Comment: Medical Marijuana currently        Medications:      acetaminophen (TYLENOL) 500 MG tablet   albuterol (2.5 MG/3ML) 0.083% neb solution   albuterol (PROAIR HFA/PROVENTIL HFA/VENTOLIN HFA) 108 (90 Base) MCG/ACT Inhaler   amoxicillin (AMOXIL) 875 MG tablet   ARIPiprazole (ABILIFY) 15 MG tablet   busPIRone (BUSPAR) 15 MG tablet   clonazePAM (KLONOPIN) 0.5 MG tablet   cyclobenzaprine (FLEXERIL) 10 MG tablet   dicyclomine (BENTYL) 20 MG tablet   diphenhydrAMINE (BENADRYL) 50 MG capsule   DULoxetine (CYMBALTA) 60 MG EC capsule   EPINEPHrine (EPIPEN/ADRENACLICK/OR ANY BX GENERIC EQUIV) 0.3 MG/0.3ML injection 2-pack   gabapentin (NEURONTIN) 300 MG capsule   hydrocortisone (ANUSOL-HC) 25 MG suppository   levocetirizine (XYZAL) 5 MG tablet   meclizine (ANTIVERT) 12.5 MG tablet   medical cannabis (Patient's own supply)   medroxyPROGESTERone (DEPO-PROVERA) 150 MG/ML IM injection   mupirocin (BACTROBAN) 2 % external ointment   NONFORMULARY   nystatin (MYCOSTATIN) cream   ondansetron (ZOFRAN ODT) 4 MG ODT tab   [START ON 5/9/2019] oxyCODONE (ROXICODONE) 5 MG tablet   OXYCODONE-ACETAMINOPHEN PO   pantoprazole (PROTONIX) 40 MG EC tablet   polyethylene glycol (MIRALAX/GLYCOLAX) powder   prazosin (MINIPRESS) 1 MG capsule   prochlorperazine (COMPAZINE) 10 MG tablet   prochlorperazine (COMPAZINE) 25 MG suppository    promethazine (PHENERGAN) 25 MG tablet   rizatriptan (MAXALT) 5 MG tablet   rizatriptan (MAXALT-MLT) 5 MG ODT tab   sucralfate (CARAFATE) 1 GM tablet   varenicline (CHANTIX) 1 MG tablet         Review of Systems   All other systems reviewed and are negative.      Physical Exam   BP: 136/88  Pulse: 105  Temp: 99.1  F (37.3  C)  Resp: 20  Weight: 81.6 kg (180 lb)  SpO2: 96 %      Physical Exam   Constitutional: She is oriented to person, place, and time. No distress.   HENT:   Head: Normocephalic and atraumatic.   Right Ear: External ear normal.   Left Ear: External ear normal.   Nose: Nose normal.   Mouth/Throat: Oropharynx is clear and moist. No oropharyngeal exudate.   Eyes: Pupils are equal, round, and reactive to light. Conjunctivae and EOM are normal. Right eye exhibits no discharge. Left eye exhibits no discharge. No scleral icterus.   Neck: Normal range of motion. Neck supple. No thyromegaly present.   Cardiovascular: Normal rate, regular rhythm and normal heart sounds.   No murmur heard.  Pulmonary/Chest: Effort normal and breath sounds normal. No respiratory distress. She has no wheezes. She has no rales. She exhibits no tenderness.   Abdominal: Soft. Bowel sounds are normal. She exhibits no distension and no mass. There is tenderness (Generalized.  Mild.). There is no rebound and no guarding.   Musculoskeletal: Normal range of motion. She exhibits no edema, tenderness or deformity.   Lymphadenopathy:     She has no cervical adenopathy.   Neurological: She is alert and oriented to person, place, and time. No cranial nerve deficit.   Skin: Skin is warm and dry. Capillary refill takes less than 2 seconds. No rash noted. She is not diaphoretic. No erythema.   Psychiatric: She has a normal mood and affect. Her behavior is normal. Thought content normal.   Nursing note and vitals reviewed.      ED Course        Procedures               Critical Care time:  none               Results for orders placed or  performed during the hospital encounter of 05/06/19 (from the past 24 hour(s))   CBC with platelets differential   Result Value Ref Range    WBC 9.6 4.0 - 11.0 10e9/L    RBC Count 3.70 (L) 3.8 - 5.2 10e12/L    Hemoglobin 12.4 11.7 - 15.7 g/dL    Hematocrit 36.6 35.0 - 47.0 %    MCV 99 78 - 100 fl    MCH 33.5 (H) 26.5 - 33.0 pg    MCHC 33.9 31.5 - 36.5 g/dL    RDW 12.3 10.0 - 15.0 %    Platelet Count 240 150 - 450 10e9/L    Diff Method Automated Method     % Neutrophils 69.1 %    % Lymphocytes 18.0 %    % Monocytes 11.1 %    % Eosinophils 1.2 %    % Basophils 0.3 %    % Immature Granulocytes 0.3 %    Nucleated RBCs 0 0 /100    Absolute Neutrophil 6.6 1.6 - 8.3 10e9/L    Absolute Lymphocytes 1.7 0.8 - 5.3 10e9/L    Absolute Monocytes 1.1 0.0 - 1.3 10e9/L    Absolute Basophils 0.0 0.0 - 0.2 10e9/L    Abs Immature Granulocytes 0.0 0 - 0.4 10e9/L    Absolute Nucleated RBC 0.0    Comprehensive metabolic panel   Result Value Ref Range    Sodium 145 (H) 133 - 144 mmol/L    Potassium 3.8 3.4 - 5.3 mmol/L    Chloride 109 94 - 109 mmol/L    Carbon Dioxide 27 20 - 32 mmol/L    Anion Gap 9 3 - 14 mmol/L    Glucose 110 (H) 70 - 99 mg/dL    Urea Nitrogen 21 7 - 30 mg/dL    Creatinine 0.71 0.52 - 1.04 mg/dL    GFR Estimate >90 >60 mL/min/[1.73_m2]    GFR Estimate If Black >90 >60 mL/min/[1.73_m2]    Calcium 8.9 8.5 - 10.1 mg/dL    Bilirubin Total 0.2 0.2 - 1.3 mg/dL    Albumin 3.4 3.4 - 5.0 g/dL    Protein Total 7.0 6.8 - 8.8 g/dL    Alkaline Phosphatase 87 40 - 150 U/L    ALT 30 0 - 50 U/L    AST 16 0 - 45 U/L   Influenza A/B antigen   Result Value Ref Range    Influenza A/B Agn Specimen Nasopharyngeal     Influenza A Negative NEG^Negative    Influenza B Negative NEG^Negative     *Note: Due to a large number of results and/or encounters for the requested time period, some results have not been displayed. A complete set of results can be found in Results Review.       Medications   0.9% sodium chloride BOLUS (0 mLs Intravenous  Stopped 5/7/19 0029)     Followed by   sodium chloride 0.9% infusion (has no administration in time range)   ondansetron (ZOFRAN) injection 4 mg (4 mg Intravenous Given 5/7/19 0004)   diphenhydrAMINE (BENADRYL) injection 25 mg (25 mg Intravenous Given 5/6/19 2335)   prochlorperazine (COMPAZINE) injection 10 mg (10 mg Intravenous Given 5/6/19 2336)   HYDROmorphone (PF) (DILAUDID) injection 0.5 mg (0.5 mg Intravenous Given 5/6/19 2339)   ketorolac (TORADOL) injection 30 mg (30 mg Intravenous Given 5/7/19 0031)       Assessments & Plan (with Medical Decision Making)     Viral URI with cough  Diarrhea     29 year old female presents for evaluation of URI symptoms for the past 2 days including rhinorrhea, congestion, otalgia, sore throat, and a cough occasionally productive of green sputum.  Seen by her PCP earlier today and diagnosed with acute otitis media.  Treated with amoxicillin, but the patient has not taken any of the medication yet.  Developed diarrhea earlier this evening with generalized abdominal pain and states that she has had about 20 stools since onset of her symptoms.  No blood or pus in the stool.  No improvement with home Zofran, Maalox, and Imodium.  On exam blood pressure 136/88, pulse 105, temperature 99.1, and oxygen saturation 96% on room air.  Patient has a very deep cough on exam, but her pulmonary exam is without wheezing, rales, or rhonchi.  No significant TM erythema on exam.  No serous effusion.  Abdomen with good bowel sounds and some mild generalized abdominal discomfort, but no rebound, guarding, or mass.  IV was established.  Labs obtained and display no significant abnormality.  Hemoglobin stable at 12.4.  His white blood cell count normal at 9600 with normal differential.  Comprehensive metabolic panel all within normal limits.  Influenza swab negative.  She was given 1 L of IV normal saline for rehydration purposes along with IV Zofran, IV Benadryl, IV Compazine, and IV Dilaudid.   Patient felt much improved, and was requesting to return home.  She has not started the antibiotic, has a normal ENT exam, and now has diarrhea.  We discussed that antibiotics can worsen diarrhea, and given her normal exam findings I would recommend not starting it.  Her symptoms are most consistent with a viral URI.  Conservative care measures discussed.  Push clear fluids.  Clear liquid diet until her bowels have settled down for at least 12 to 18 hours discussed.  Advance to the BRAT diet after that.  Oral rehydration measures reviewed.  Patient was in agreement with this plan and was suitable for discharge.     I have reviewed the nursing notes.    I have reviewed the findings, diagnosis, plan and need for follow up with the patient.          Medication List      There are no discharge medications for this visit.         Final diagnoses:   Viral URI with cough   Diarrhea       Disclaimer: This note consists of symbols derived from keyboarding, dictation and/or voice recognition software. As a result, there may be errors in the script that have gone undetected. Please consider this when interpreting information found in this chart.      5/6/2019   Tomas De La Paz PA-C   [facility] EMERGENCY DEPARTMENT     Tomas De La Paz PA-C  05/07/19 0042

## 2019-05-07 NOTE — TELEPHONE ENCOUNTER
Central Prior Authorization Team   [phone]    PA Initiation     Medication: hydrocortisone (ANUSOL-HC) 25 MG suppository  Insurance Company: [employer] - Phone [phone] Fax [phone]  [facility] [address] [place] [address] - [facility] AT [address]  Filling Pharmacy Phone: [phone]  Filling Pharmacy Fax: [phone]  Start Date: 5/7/2019    Initiated PA by phone at [phone]. Reference/Case # [id number]

## 2019-05-07 NOTE — ED NOTES
Pt in room with primary nurse;  services waiting for connection; pt now saying she doesn't want  and that we can write.

## 2019-05-07 NOTE — PROGRESS NOTES
SUBJECTIVE:   Katy Islas is a 29 year old female who presents to clinic today for the following health issues:  HPI     Back Pain Follow Up      Description:   Location of pain:  bilateral  Character of pain: sharp dull and shooting  and constant  Pain radiation: Left arm   Since last visit, pain is:  worsened  New numbness or weakness in legs, not attributed to pain:  no     Intensity: Currently 6/10    History:   Pain interferes with job: Not applicable  Therapies tried without relief: icey hot, chiropractor, heat and massage  Therapies tried with relief: cold and opioids     - Pus coming out of belly button / for pain   - Pain has been really bad, pt requesting to increase to 60 mg   - Had to cancel physical therapy, was having diarrhea   - No improvement in neck pain, will call surgeon today   - Hasn't gotten called about myelogram yet       Plan from last visit 4/29/19  - Patient just seen on 4/24/19   - States used some oxycodone, did not bring the bottle today, but didn't use over the weekend due to constipation   - Will give Miralax for constipation as this has helped in the past      - - Patient with complicated spinal history including L4-5 disectomy (10/19/11), L4-5 fusion (2/23/17), coccyx fracture and removal (complicated by infection - 10/18/17), and SI Joint fusion (10/26/18)   - Records received from [facility] - Dr. 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, improving with conservative therapy including physical therapy, injection (done yesterday 4/23/19), and bracing       Recommending thoracic and lumbar myelogram and post myelo CT, surgeon to follow up after this is complete for finalization of plan      - Patient is on medical marijuana for mood/PTSD issues (following with counseling and psychiatry as well)   -  reviewed   - CSA - 4/24/19        Oxycodone 5 mg, max 4/day       Reviewed risks of narcotic  therapy including sedation, addiction, and constipation       Has had history of drug-seeking behavior but not for awhile, as above does have pathology     - Patient frustrated with care, will get second opinion with [facility] (scheduled for 5/16/19)       Encouraged her to still keep appointment for CT myelogram (originally scheduled for 5/14/19 with Dr. Millan but per patient he is out so she was told to schedule with [facility] for this)   - Encouraged her to keep scheduled physical therapy which will start today 4/29/19      - Gave 1 week RX dated for Thursday 5/2/19 since was given one week rx on 4/24/19     Patient to call for next rx due around 5/9/19 and I will approve, patient frustrated with this but I told her that I wasn't going to give her another script until I see how fast she is using it (per her report above didn't take for 2 days over the weekend) plus she should be starting to taper down as her injection from 4/23/19 should start to be effective      - Patient requesting Toradol for pain as has helped in the past, this was given after reviewing use and side effects      - Recheck 2 weeks         Additional history: as documented    Reviewed and updated as needed this visit by clinical staff  Allergies  Meds  Problems  Med Hx  Surg Hx         Reviewed and updated as needed this visit by Provider  Allergies  Meds  Problems  Med Hx  Surg Hx           ROS:  Constitutional, HEENT, cardiovascular, pulmonary, gi and gu systems are negative, except as otherwise noted.    OBJECTIVE:   /80   Pulse 130   Temp 99.8  F (37.7  C) (Temporal)   Resp 18   Wt 84.4 kg (186 lb)   LMP 05/02/2019 (Exact Date)   SpO2 98%   BMI 34.01 kg/m    Body mass index is 34.01 kg/m .  GENERAL APPEARANCE: healthy, alert and no distress  EYES: Eyes grossly normal to inspection, PERRLA, conjunctivae and sclerae without injection or discharge, EOM intact   MS: No musculoskeletal defects are noted and gait is  age appropriate without ataxia   SKIN:   Umbilicus - erythematous on surrounding tissue, painful to touch, slightly warm, not able to palpate any abscess, no discharge expressed, explored umbilicus with sterile swab, not able to get any discharge, this was painful for patient   No other suspicious lesions or rashes, hydration status appears adeuqate with normal skin turgor   PSYCH: Alert and oriented x3; speech- coherent , normal rate and volume; able to articulate logical thoughts, able to abstract reason, no tangential thoughts, no hallucinations or delusions, mentation appears normal, Mood is euthymic. Affect is appropriate for this mood state and bright. Thought content is free of suicidal ideation, hallucinations, and delusions. Dress is adequate and upkept. Eye contact is good during conversation.       Diagnostic Test Results:  none     ASSESSMENT/PLAN:       ICD-10-CM    1. Cellulitis of umbilicus L03.316 cephALEXin (KEFLEX) 500 MG capsule   2. Chronic pain syndrome G89.4 oxyCODONE (ROXICODONE) 5 MG tablet   3. Bulge of cervical disc without myelopathy M50.20 oxyCODONE (ROXICODONE) 5 MG tablet   4. Chronic bilateral low back pain with left-sided sciatica M54.42     G89.29    5. Lumbar disc disease with radiculopathy M51.16      1. Cellulitis  - Discussed likely cellulitis infection   - Recommend oral antibiotics, patient very sensitive with lots of allergies   - Does well on Keflex, will do this BID for 10 days   - Use topical antibiotic cream, keep clean and dry  - Discussed warning signs that would warrant return to clinic/ED     2-5. Pain   - - Patient with complicated spinal history including L4-5 disectomy (10/19/11), L4-5 fusion (2/23/17), coccyx fracture and removal (complicated by infection - 10/18/17), and SI Joint fusion (10/26/18)   - Records received from [facility] - Dr. 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 Millan but per patient he is out so she was told to schedule with [facility] for this - she has not received this call so will call them today to get scheduled     - Encouraged her to keep scheduled physical therapy which will start today(missed 4/29/19 due to diarrhea)    - Patient is on medical marijuana for mood/PTSD issues (following with counseling and psychiatry as well)   - Icing regularly along with stretches from previous physical therapy   - Also taking Gabapentin and Flexeril   -  reviewed   - CSA - 4/24/19        Oxycodone 5 mg, max 4/day       Reviewed risks of narcotic therapy including sedation, addiction, and constipation       Has had history of drug-seeking behavior but not for awhile, as above does have pathology    - Patient asking for an increase in this to 6/day      Discussed I will go to 5/day but no further as she hasn't been the best at follow ups (also noted by ED to drive after lying to them and stating she had a ride after being given IV narcotics)      Oxycodone 5 mg, max 5/day, total dose 25 mg    - Recheck 2 weeks, needs to go to physical therapy and get appointment with [facility] for me to continue     The patient indicates understanding of these issues and agrees with the plan.    Follow up: 2 weeks     Due to language barrier, an  was present during the history-taking and subsequent discussion (and for part of the physical exam) with this patient.      Aura Tomlinson-LOVE Moss  [facility]

## 2019-05-07 NOTE — DISCHARGE INSTRUCTIONS
It was a pleasure working with you today!  I hope your condition improves rapidly!     Thankfully, you are feeling much better after receiving IV fluids for rehydration purposes.  Please stay on a clear liquid diet until your diarrhea has improved significantly.  This may take a day or two.  Progress to the BRAT diet (banana, rice, applesauce, toast) after this.    I am concerned that your runny nose, congestion, ear discomfort, and cough are all related to a virus.  Your ear exam is completely normal this evening.  Given the normal exam findings, and the fact that you are already suffering from diarrhea, I would not recommend taking the antibiotic.  Antibiotics can worsen diarrhea.

## 2019-05-07 NOTE — ED AVS SNAPSHOT
[facility] Emergency Department  [address] [place] [address]  Phone:  [phone]  Fax:  [phone]                                    Katy Islas   MRN: [id number]    Department:  [facility] Emergency Department   Date of Visit:  5/7/2019           After Visit Summary Signature Page    I have received my discharge instructions, and my questions have been answered. I have discussed any challenges I see with this plan with the nurse or doctor.    ..........................................................................................................................................  Patient/Patient Representative Signature      ..........................................................................................................................................  Patient Representative Print Name and Relationship to Patient    ..................................................               ................................................  Date                                   Time    ..........................................................................................................................................  Reviewed by Signature/Title    ...................................................              ..............................................  Date                                               Time          22EPIC Rev 08/18

## 2019-05-07 NOTE — TELEPHONE ENCOUNTER
Katy Islas is a 29 year old female who calls with follow up questions.    NURSING ASSESSMENT:  Description:  Spoke with patient through .  Was sen in clinic and in ED yesterday.  Dx with ear infection.  Did not start antibiotics as ED thought she didn't have ear infections.    Onset/duration:  yesterday  Precip. factors:  ASL  Associated symptoms:  Has been vomiting x 5, stomach acid.  Has not been eating much.  Still urinating ok. Feels that the ears are worse.  Also states that she has diarrhea and has some blood with wiping.  Hx of hemorrhoids.    Improves/worsens symptoms:  Nothing seems to be helping  Pain scale (0-10)  varies  LMP/preg/breast feeding:  NA  Last exam/Treatment:  yesterday  Allergies:   Allergies   Allergen Reactions     Ativan [Lorazepam] Hives     At the IV site     Baclofen      hives     Bees Hives, Swelling and Difficulty breathing     Caffeine      Contrast Dye      Hives,   Updated 5/10/2016 CT Contrast.     Iodine Hives     Methocarbamol Swelling     Metoclopramide      Other reaction(s): Tremors  LW Reaction: shaking/sweating     Midazolam      Other reaction(s): Agitation     Monosodium Glutamate      Morphine Other (See Comments)     Difficulty with urination     Nsaids Other (See Comments)     GI BLEED x2     Other (Do Not Use) Other (See Comments)     Xanaflex- pt becomes disoriented and loses bladder control     Reglan [Metoclopramide Hcl] Other (See Comments)     shaking     Soma [Carisoprodol] Visual Disturbance     Sleep walking     Tizanidine      Topamax Other (See Comments)     Topamax [Topiramate] Nausea     Tingling  GI/Vomit     Tramadol      Severe Headache, Seizure Risk     Tylenol W/Codeine [Acetaminophen-Codeine] Nausea and Itching     Tylenol 3     Valium Other (See Comments)     Becomes aggressive and angry     Versed Other (See Comments)     Zolmitriptan      Makes face feel like its twitching     Droperidol Anxiety     Flu Virus Vaccine Rash       Arm swelling       MEDICATIONS:   Taking medication(s) as prescribed? No  Taking over the counter medication(s?) Yes  Any medication side effects? No significant side effects    Any barriers to taking medication(s) as prescribed?  No  Medication(s) improving/managing symptoms?  N/A  Medication reconciliation completed: Yes      NURSING PLAN: Nursing advice to patient need to try OTC and home cares for vomiting with diarrhea.  fluids, ice chips and then bland foods crackers and dry toast.  once improving can try antibiotics for ears.  Can you OTC tylenol for pain. return call to clinic if not improving    RECOMMENDED DISPOSITION:  Home care advice - see above  Will comply with recommendation: Yes  If further questions/concerns or if symptoms do not improve, worsen or new symptoms develop, call your PCP or [facility] as soon as possible.      Guideline used:  Telephone Triage Protocols for Nurses, Fifth Edition, Telma Bernstein, RN, BSN

## 2019-05-07 NOTE — ED PROVIDER NOTES
History     Chief Complaint   Patient presents with     Diarrhea     HPI  Katy Islas is a 29 year old female who has a history of Crohn's disease presents the emergency department complaining of nausea vomiting diarrhea and abdominal pain.  She is had some green stools with red streaks in them but not much blood.  The pain is located in the left lower quadrant but also diffuse.  This is different than her usual abdominal pains which are more diffuse and epigastric.  She has not had fever or chills.  She is had approximately 20 bowel movements in 24 hours and has had decreased urine output.  She has had about 5 episodes of vomiting and has been unable to eat.  She was evaluated here last night for the same thing and had stable looking labs and was treated with IV Compazine, Benadryl and Dilaudid with improvement of her discomfort.  She has a history of C. difficile colitis but has not been on antibiotics recently with the exception of starting amoxicillin today for an ear infection that was diagnosed in the clinic.  After she was seen here last night it was felt that she did not have an ear infection and was told not to take the antibiotic but she started anyway.  She took Compazine and Zofran at home without relief.  She states the pain in her abdomen feels like knives stabbing her abdomen.    Allergies:  Allergies   Allergen Reactions     Ativan [Lorazepam] Hives     At the IV site     Baclofen      hives     Bees Hives, Swelling and Difficulty breathing     Caffeine      Contrast Dye      Hives,   Updated 5/10/2016 CT Contrast.     Iodine Hives     Methocarbamol Swelling     Metoclopramide      Other reaction(s): Tremors  LW Reaction: shaking/sweating     Midazolam      Other reaction(s): Agitation     Monosodium Glutamate      Morphine Other (See Comments)     Difficulty with urination     Nsaids Other (See Comments)     GI BLEED x2     Other (Do Not Use) Other (See Comments)     Xanaflex- pt becomes  disoriented and loses bladder control     Reglan [Metoclopramide Hcl] Other (See Comments)     shaking     Soma [Carisoprodol] Visual Disturbance     Sleep walking     Tizanidine      Topamax Other (See Comments)     Topamax [Topiramate] Nausea     Tingling  GI/Vomit     Tramadol      Severe Headache, Seizure Risk     Tylenol W/Codeine [Acetaminophen-Codeine] Nausea and Itching     Tylenol 3     Valium Other (See Comments)     Becomes aggressive and angry     Versed Other (See Comments)     Zolmitriptan      Makes face feel like its twitching     Droperidol Anxiety     Flu Virus Vaccine Rash      Arm swelling       Problem List:    Patient Active Problem List    Diagnosis Date Noted     Bulge of cervical disc without myelopathy 04/24/2019     Priority: Medium     Cervicalgia 04/17/2019     Priority: Medium     Class 1 obesity due to excess calories without serious comorbidity with body mass index (BMI) of 32.0 to 32.9 in adult 04/03/2019     Priority: Medium     Hypophosphatemia 11/01/2018     Priority: Medium     Patellar maltracking, right 09/05/2018     Priority: Medium     Right anterior knee pain 09/05/2018     Priority: Medium     Melanocytic nevus, unspecified location 07/23/2018     Priority: Medium     Dysplastic skin lesion 07/23/2018     Priority: Medium     Iliotibial band syndrome affecting lower leg, right 12/21/2017     Priority: Medium     Chondromalacia of right patellofemoral joint 12/21/2017     Priority: Medium     Upper GI bleed - suspected 07/01/2017     Priority: Medium     Tobacco use disorder 07/01/2017     Priority: Medium     History of pseudoseizure 07/01/2017     Priority: Medium     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) 03/10/2017     Priority: Medium     Lumbar disc disease with radiculopathy 02/23/2017     Priority: Medium     S/P lumbar fusion 02/23/2017     Priority: Medium     Dr. YOVANI Millan, 2/23/17       Vitamin D deficiency 01/06/2017     Priority: Medium      Atypical mole 01/06/2017     Priority: Medium     Mild persistent asthma without complication 12/02/2016     Priority: Medium     Chronic bilateral low back pain with left-sided sciatica 12/02/2016     Priority: Medium     Bee sting allergy 09/16/2016     Priority: Medium     Gastroesophageal reflux disease without esophagitis 08/26/2016     Priority: Medium     Herpes simplex virus infection 11/12/2015     Priority: Medium     Adjustment disorder with mixed anxiety and depressed mood 10/14/2015     Priority: Medium     Marijuana abuse 02/22/2015     Priority: Medium     Bipolar disorder (H) 01/31/2013     Priority: Medium     Problem list name updated by automated process. Provider to review       Chronic pain 02/09/2012     Priority: Medium     Patient is followed by Aura Rosario PA-C for ongoing prescription of pain medication.  All refills should only be approved by this provider, or covering partner.    Medication(s): Norco    Maximum quantity per month: 70  Clinic visit frequency required: Q 2 months     Controlled substance agreement:   Discussed and signed 4/25/17    Encounter-Level CSA - 01/12/2015:                 Controlled Substance Agreement - Scan on 1/26/2015  9:14 AM : Controlled Medication Agreement-01/12/15 (below)            Pain Clinic evaluation in the past: Yes       Date/Location:   [facility], fall 2016    DIRE Total Score(s):    4/25/2017   Total Score 17       Last [facility] website verification:  done on 4/25/17 by LOVE Maki   [email or web address]           Anxiety 09/22/2011     Priority: Medium     Mild major depression (H) 08/29/2011     Priority: Medium     Mild intermittent asthma 10/12/2009     Priority: Medium     Overview:   Formatting of this note might be different from the original.  Action plan: See letter 4/10/2013   ATAQ:   Asthma Data 4/10/2013   Date completed 4/10/2013   Missed daily activities no = 0   Wake at night no = 0   Believe asthma  in control yes = 0   ARIN use yes   Maximum ARIN use in 1 day 1-4 = 0   ATAQ score 0 = well controlled   Asthma ED visits in past 12 mos 0   Asthma hospitalizations in past 12 mos 0     Current Outpatient Prescriptions   Medication Sig     albuterol (PROVENTIL) 0.083 % neb solution Inhale 3 mL via a nebulizer every 4 hours if needed for Shortness Of Breath.        Crohn's colitis (H) 08/04/2009     Priority: Medium     Dysmenorrhea 05/11/2007     Priority: Medium     Benign neoplasm of skin of lower limb, including hip 03/16/2004     Priority: Medium     Migraine      Priority: Medium     Dr. Farrar - Neurology.  Now on Inderal.  Seems to be working.  Follow-up 9/08  Problem list name updated by automated process. Provider to review       Hearing loss      Priority: Medium     Congenital  Problem list name updated by automated process. Provider to review       Allergic rhinitis      Priority: Medium     Problem list name updated by automated process. Provider to review          Past Medical History:    Past Medical History:   Diagnosis Date     Abdominal pain 10/31- 11/4/2005     Allergic rhinitis, cause unspecified      Arthritis      C. 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, without mention of intractable epilepsy      Migraine 07/21/12     Migraine, unspecified, without mention of intractable migraine without mention of status migrainosus      Mild intermittent asthma      Mycoplasma infection in conditions classified elsewhere and of unspecified site      Other chronic pain      Renal disease      Unspecified hearing loss        Past Surgical History:    Past Surgical History:   Procedure Laterality Date     AS REMOVAL  OF COCCYX  10/18/2017     C FUSION OF SACROILIAC JOINT  10/26/2018     COLONOSCOPY  7/1/2009    [facility].     COMBINED ESOPHAGOSCOPY, GASTROSCOPY, DUODENOSCOPY (EGD) WITH CO2 INSUFFLATION N/A 4/12/2019    Procedure: COMBINED ESOPHAGOSCOPY, GASTROSCOPY, DUODENOSCOPY (EGD) WITH CO2 INSUFFLATION;  Surgeon: Edwin Conde MD;  Location: [facility] OR     ESOPHAGOSCOPY, GASTROSCOPY, DUODENOSCOPY (EGD), COMBINED N/A 4/12/2019    Procedure: Combined Esophagoscopy, Gastroscopy, Duodenoscopy (Egd), Biopsy Single Or Multiple;  Surgeon: Edwin Conde MD;  Location: [facility] OR     FUSION SPINE POSTERIOR MINIMALLY INVASIVE ONE LEVEL N/A 2/23/2017    Procedure: FUSION SPINE POSTERIOR MINIMALLY INVASIVE ONE LEVEL;  L4-5 Oblique Lateral Lumbar Interbody Fusion   Epidural steroid injection.   Transpedicular Bone marrow aspiration;  Surgeon: Jeniffer Eugene MD;  Location: [facility] OR     HC COLONOSCOPY THRU STOMA WITH BIOPSY  10/29/2003    Impression is that of normal appearing colonoscopy, without evidence of rectal bleeding.     HC COLONOSCOPY THRU STOMA, DIAGNOSTIC  10/00    normal     HC COLONOSCOPY THRU STOMA, DIAGNOSTIC  Oct 2009    Dr López- normal     HC EEG AWAKE AND SLEEP      abnormal     HC MRI BRAIN W/O CONTRAST  12/00    normal     HC REMOVAL GALLBLADDER  8/5/2009    [facility].     HC UGI ENDOSCOPY DIAG W BIOPSY  11/11/09    Normal esophagus     HC UGI ENDOSCOPY, SIMPLE EXAM  7/00, 10/00    mild chronic esophagitis and duodenitis, neg H pylori     HC UGI ENDOSCOPY, SIMPLE EXAM  01/20/2005    Esophagogastroduodenoscopy, colonoscopy with biopsies.  [facility] UGI ENDOSCOPY, SIMPLE EXAM  7/1/2009    [facility].      UGI ENDOSCOPY, SIMPLE EXAM  11/11/2009    attempted upper GI, pt. could not tolerate procedure:[facility]     ORTHOPEDIC SURGERY  October 19,2011    diskectomy L4-L5       Family History:    Family  History   Problem Relation Age of Onset     Gastrointestinal Disease Brother         severe Crohn's     Neurologic Disorder Brother         Seizures post head injury     Depression Brother      Substance Abuse Brother      Genitourinary Problems Father         kidney stones     Diabetes Father      Heart Disease Father         Open heart surgery     Breast Cancer Maternal Grandmother      Parkinsonism Maternal Grandmother      Cerebrovascular Disease Paternal Grandmother      Cancer Maternal Grandfather         Lung     Cardiovascular Paternal Grandfather         Heart Attack     Substance Abuse Mother      Lung Cancer Paternal Uncle      Cancer Paternal Uncle      Lung Cancer Maternal Uncle        Social History:  Marital Status:  Single [1]  Social History     Tobacco Use     Smoking status: Current Every Day Smoker     Packs/day: 0.50     Years: 5.00     Pack years: 2.50     Types: Cigarettes     Smokeless tobacco: Never Used     Tobacco comment: chantix   Substance Use Topics     Alcohol use: Not Currently     Comment: Not since last July     Drug use: Yes     Types: Marijuana     Comment: Medical Marijuana currently        Medications:      acetaminophen (TYLENOL) 500 MG tablet   amoxicillin (AMOXIL) 875 MG tablet   [START ON 5/9/2019] oxyCODONE (ROXICODONE) 5 MG tablet   albuterol (2.5 MG/3ML) 0.083% neb solution   albuterol (PROAIR HFA/PROVENTIL HFA/VENTOLIN HFA) 108 (90 Base) MCG/ACT Inhaler   ARIPiprazole (ABILIFY) 15 MG tablet   busPIRone (BUSPAR) 15 MG tablet   clonazePAM (KLONOPIN) 0.5 MG tablet   cyclobenzaprine (FLEXERIL) 10 MG tablet   dicyclomine (BENTYL) 20 MG tablet   diphenhydrAMINE (BENADRYL) 50 MG capsule   DULoxetine (CYMBALTA) 60 MG EC capsule   EPINEPHrine (EPIPEN/ADRENACLICK/OR ANY BX GENERIC EQUIV) 0.3 MG/0.3ML injection 2-pack   gabapentin (NEURONTIN) 300 MG capsule   hydrocortisone (ANUSOL-HC) 25 MG suppository   levocetirizine (XYZAL) 5 MG tablet   meclizine (ANTIVERT) 12.5 MG tablet    medical cannabis (Patient's own supply)   medroxyPROGESTERone (DEPO-PROVERA) 150 MG/ML IM injection   mupirocin (BACTROBAN) 2 % external ointment   NONFORMULARY   nystatin (MYCOSTATIN) cream   ondansetron (ZOFRAN ODT) 4 MG ODT tab   OXYCODONE-ACETAMINOPHEN PO   pantoprazole (PROTONIX) 40 MG EC tablet   polyethylene glycol (MIRALAX/GLYCOLAX) powder   prazosin (MINIPRESS) 1 MG capsule   prochlorperazine (COMPAZINE) 10 MG tablet   prochlorperazine (COMPAZINE) 25 MG suppository   promethazine (PHENERGAN) 25 MG tablet   rizatriptan (MAXALT) 5 MG tablet   rizatriptan (MAXALT-MLT) 5 MG ODT tab   sucralfate (CARAFATE) 1 GM tablet   varenicline (CHANTIX) 1 MG tablet         Review of Systems   All other systems reviewed and are negative.      Physical Exam   BP: (!) 157/107  Pulse: 110  Temp: 99.2  F (37.3  C)  Resp: 16  Weight: 83.9 kg (185 lb)  SpO2: 100 %      Physical Exam   Constitutional: She is oriented to person, place, and time. She appears well-developed and well-nourished. No distress.   HENT:   Head: Normocephalic and atraumatic.   Eyes: No scleral icterus.   Neck: Normal range of motion. Neck supple.   Cardiovascular: Regular rhythm.   Tachycardic   Pulmonary/Chest: Effort normal and breath sounds normal.   Abdominal: There is tenderness.   Tenderness to palpation diffusely worse in the left lower quadrant   Musculoskeletal: Normal range of motion.   Neurological: She is alert and oriented to person, place, and time.   Skin: Skin is warm and dry. No rash noted. She is not diaphoretic. No erythema. No pallor.   Psychiatric: She has a normal mood and affect. Her behavior is normal.   Nursing note and vitals reviewed.      ED Course        Procedures                 Results for orders placed or performed during the hospital encounter of 05/07/19 (from the past 24 hour(s))   CBC with platelets differential   Result Value Ref Range    WBC 9.4 4.0 - 11.0 10e9/L    RBC Count 3.89 3.8 - 5.2 10e12/L    Hemoglobin 12.9  11.7 - 15.7 g/dL    Hematocrit 38.2 35.0 - 47.0 %    MCV 98 78 - 100 fl    MCH 33.2 (H) 26.5 - 33.0 pg    MCHC 33.8 31.5 - 36.5 g/dL    RDW 12.0 10.0 - 15.0 %    Platelet Count 263 150 - 450 10e9/L    Diff Method Automated Method     % Neutrophils 76.9 %    % Lymphocytes 11.3 %    % Monocytes 10.5 %    % Eosinophils 0.6 %    % Basophils 0.2 %    % Immature Granulocytes 0.5 %    Nucleated RBCs 0 0 /100    Absolute Neutrophil 7.2 1.6 - 8.3 10e9/L    Absolute Lymphocytes 1.1 0.8 - 5.3 10e9/L    Absolute Monocytes 1.0 0.0 - 1.3 10e9/L    Absolute Basophils 0.0 0.0 - 0.2 10e9/L    Abs Immature Granulocytes 0.1 0 - 0.4 10e9/L    Absolute Nucleated RBC 0.0    Comprehensive metabolic panel   Result Value Ref Range    Sodium 141 133 - 144 mmol/L    Potassium 4.3 3.4 - 5.3 mmol/L    Chloride 109 94 - 109 mmol/L    Carbon Dioxide 26 20 - 32 mmol/L    Anion Gap 6 3 - 14 mmol/L    Glucose 97 70 - 99 mg/dL    Urea Nitrogen 19 7 - 30 mg/dL    Creatinine 0.77 0.52 - 1.04 mg/dL    GFR Estimate >90 >60 mL/min/[1.73_m2]    GFR Estimate If Black >90 >60 mL/min/[1.73_m2]    Calcium 8.8 8.5 - 10.1 mg/dL    Bilirubin Total 0.2 0.2 - 1.3 mg/dL    Albumin 3.6 3.4 - 5.0 g/dL    Protein Total 7.5 6.8 - 8.8 g/dL    Alkaline Phosphatase 92 40 - 150 U/L    ALT 30 0 - 50 U/L    AST 14 0 - 45 U/L   CT Abdomen Pelvis w/o Contrast    Narrative    CT ABDOMEN AND PELVIS WITHOUT CONTRAST 5/7/2019 3:31 PM     HISTORY: Left lower quadrant abdominal pain. History of Crohn's.     COMPARISON: CT abdomen and pelvis 12/9/2018.    TECHNIQUE: Axial images are obtained from the lung bases to the  symphysis without oral or IV contrast. Coronal reformatted images are  also generated. Radiation dose for this scan was reduced using  automated exposure control, adjustment of the mA and/or kV according  to patient size, or iterative reconstruction technique.    FINDINGS: The lung bases are clear.    Abdomen: Tiny bilateral nonobstructing renal collecting system  stones  are present. No hydronephrosis or ureteral calculi. Prior  cholecystectomy changes. Upper abdominal organs are otherwise within  normal limits allowing for the noncontrast technique including the  liver, spleen, pancreas, adrenal glands and kidneys. No enlarged lymph  nodes. The bowel is normal in caliber without obstruction,  diverticulitis or appendicitis.    Pelvis: The bladder, uterus, ovaries and rectum are unremarkable. No  enlarged pelvic lymph nodes or free fluid. Bone window examination is  unremarkable. Prior lumbar fusion at L4-5 along with left SI joint  fusion.      Impression    IMPRESSION:  1. Nonobstructing renal collecting system stones. No hydronephrosis or  ureteral calculi. No bladder calculi are noted.  2. No bowel obstruction, diverticulitis or appendicitis.  3. Postop changes from L4-5 fusion and left SI joint fusion.    HILARY LAKE MD     *Note: Due to a large number of results and/or encounters for the requested time period, some results have not been displayed. A complete set of results can be found in Results Review.       Medications   0.9% sodium chloride BOLUS (0 mLs Intravenous Stopped 5/7/19 1606)     Followed by   sodium chloride 0.9% infusion (has no administration in time range)   HYDROmorphone (PF) (DILAUDID) injection 0.5 mg (0.5 mg Intravenous Given 5/7/19 1510)   ondansetron (ZOFRAN) injection 4 mg (4 mg Intravenous Given 5/7/19 1610)   prochlorperazine (COMPAZINE) injection 10 mg (10 mg Intravenous Given 5/7/19 1503)   diphenhydrAMINE (BENADRYL) injection 25 mg (25 mg Intravenous Given 5/7/19 1501)       Assessments & Plan (with Medical Decision Making)  29-year-old female with chronic intestinal issues including Crohn's disease presented with diarrhea.  Labs are once again reassuring and CT scan is unremarkable.  She is treated with IV fluids and did well.  She also received Dilaudid, Compazine, Zofran and Benadryl.  There is no further work-up to be done today.  The  "patient has nausea medicines already at home.  She also has a gastroenterologist.  Advised her to call her gastroenterologist for the next step.  Her stool culture from yesterday was negative.  We did not do a C. difficile culture since she has not recently been on antibiotics. The differential diagnosis, treatment options, risks and follow up discussed with a competent patient and/or competent family member who agrees with the plan.    On arrival to the emergency department she handed the nursing staff and note which said \"my roommate dropped me off.  Stomach pains worse and diarrhea.  Rectal bleeding and all raw from diarrhea.  Nauseous\"   Prior to administering the medications the nurse Nacho discussed that she would need a ride if she received narcotics.  The patient confirmed that she had a ride home.  After she was discharged she was witnessed going into her car and driving off by  staff Veronika.  It was noted that her license plate was [id number].  At this point I am recommending that the patient no longer receives narcotic or mood altering medications in the emergency department as she cannot be trusted.  Personally this is a second time I have had knowledge of her driving off after being told she could not and lying that she had a ride.       I have reviewed the nursing notes.    I have reviewed the findings, diagnosis, plan and need for follow up with the patient.         Medication List      There are no discharge medications for this visit.         Final diagnoses:   Diarrhea, unspecified type       5/7/2019   [facility] EMERGENCY DEPARTMENT     Stone Perez MD  05/07/19 [id number]    "

## 2019-05-07 NOTE — TELEPHONE ENCOUNTER
PRIOR AUTHORIZATION DENIED    Medication: hydrocortisone (ANUSOL-HC) 25 MG suppository-PA DENIED     Denial Date: 5/7/2019    Denial Rational: Criteria Not Met. Insurance states that medication is excluded from patients Medicare Part D drug plan.        Appeal Information: If provider would like to appeal please provide a letter of medical necessity stating why formulary alternatives would not be clinically appropriate for patient and route back to the PA team.

## 2019-05-07 NOTE — DISCHARGE INSTRUCTIONS
Return if new or worsening symptoms.  Use your home nausea medicines for your symptoms drink frequent amounts of water in small quantities.  Advance diet as tolerated.  Your CT scan and lab work was unremarkable.  You may need to follow-up with your gastroenterologist.

## 2019-05-07 NOTE — RESULT ENCOUNTER NOTE
Final Enteric Bacteria and Virus Panel by [facility] Stool is NEGATIVE for Campylobacter group, Salmonella species, Shigella species, Shiga toxin 1 gene, Shiga toxin 2 gene, Vibrio group,  Yersinia enterocolitica,  Rotavirus A,  and Norovirus I and II.    No treatment or change in treatment per [facility] Lab Result protocol.

## 2019-05-08 ENCOUNTER — PATIENT OUTREACH (OUTPATIENT)
Dept: CARE COORDINATION | Facility: CLINIC | Age: 29
End: 2019-05-08

## 2019-05-08 ASSESSMENT — ACTIVITIES OF DAILY LIVING (ADL): DEPENDENT_IADLS:: INDEPENDENT

## 2019-05-08 NOTE — PROGRESS NOTES
Clinic Care Coordination Contact  [facility]/Voicemail    Referral Source: ED Follow-Up    Clinical Data: Care Coordinator Outreach, ED f/u 5/6 & 5/7 diarrhea. Hx Crohns disease. Needs to f/u with GI. Has f/u with PCP 5/13/19.     Outreach attempted x 1.  Left message on voicemail with call back information and requested return call.    Plan: Care Coordinator will mail out care coordination introduction letter with care coordinator contact information and explanation of care coordination services. Care Coordinator will try to reach patient again in 1-2 business days.    Tanika ERICKSON, RN, PHN, [facility]  Primary Care Clinic RN Care Coordinator  [facility]   [email or web address]  Office:  [phone]

## 2019-05-08 NOTE — LETTER
[place] CARE COORDINATION  [facility]   [address] [place] [address]   [phone]    May 8, 2019    Katy Islas  [address]  [place] [address]      Dear Katy,    I am a clinic care coordinator who works with Aura Rosario PA-C at [facility]. I have been trying to reach you recently to introduce Clinic Care Coordination and to see if there was anything I could assist you with.  I wanted to introduce myself and provide you with my contact information so that you can call me with questions or concerns about your health care. Below is a description of clinic care coordination and how I can further assist you.     The clinic care coordinator is a registered nurse and/or  who understand the health care system. The goal of clinic care coordination is to help you manage your health and improve access to the [facility] system in the most efficient manner. The registered nurse can assist you in meeting your health care goals by providing education, coordinating services, and strengthening the communication among your providers. The  can assist you with financial, behavioral, psychosocial, chemical dependency, counseling, and/or psychiatric resources.    Please feel free to contact me at [phone], with any questions or concerns. We at [facility] are focused on providing you with the highest-quality healthcare experience possible and that all starts with you.     Sincerely,     Tanika ERICKSON, RN, PHN, [facility]  Primary Care Clinic RN Care Coordinator  [facility]   [email or web address]  Office:  [phone]    Enclosed: I have enclosed a copy of a 24 Hour Access Plan. This has helpful phone numbers for you to call when needed. Please keep this in an easy to access place to use as needed.

## 2019-05-08 NOTE — LETTER
Health Care Home - Access Care Plan    About Me:    Patient Name:  Katy Islas    YOB: 1990  Age:                      29 year old   Joseph MRN:     [id number] Telephone Information:   Home Phone [phone]   Mobile [phone]       Address:  [address] [place] [address] Email address:  [email or web address]      Emergency Contact(s)   Name Relationship Lgl Grd Work Phone Home Phone Mobile Phone   1. JOSEPH ISLAS Mother No none [phone]   2. NO 2ND CONTACT*                  Health Maintenance: Routine Health maintenance Reviewed: Due/Overdue   Health Maintenance Due   Topic Date Due     HIV SCREEN (SYSTEM ASSIGNED)  03/18/2007     MEDICARE ANNUAL WELLNESS VISIT  04/25/2018     ASTHMA CONTROL TEST Q6 MOS  02/10/2019     DEPO Q 3 MONTHS  03/13/2019     Pt to talk with provider about what is needed or can continue wait.        My Access Plan  Medical Emergency 690   Questions or concerns during clinic hours Primary Clinic Line, I will call the clinic directly: [facility] - [phone]   24 Hour Appointment Line [phone] or  [phone] [place] [phone]) (toll free)   24 Hour Nurse Line [phone] (toll free)   Questions or concerns outside clinic hours 24 Hour Appointment Line, I will call the after-hours on-call line:   [facility] [phone] or [phone]) (toll-free)   Preferred Urgent Care [facility], [phone]   Preferred Hospital [facility]  [phone]   Preferred Pharmacy [facility] [address]     Behavioral Health Crisis Line The National Suicide Prevention Lifeline at 1-185.868.7353 or 911                     My Care Team Members  Patient Care Team       Relationship Specialty Notifications Start End    Aura Rosario PA-C PCP - General Physician Assistant - Medical  9/19/16      Phone: [phone] Fax: [phone]         [address] [facility] [address] [facility] [address]    Natanael Guaman MD  Internal Medicine  8/25/11     Phone: [phone] Fax: [phone]         XXX RESIGNED  MAIN [facility] [address]    Aura Rosario PA-C Assigned PCP   9/25/16     Phone: [phone] Fax: [phone]         [address] [address]    Chrissie Pfeiffer     2/2/18     Kenzie is attempting to reach Patient to assess for case management     Phone: [phone]         Dee Funk, DOYLE Clinic Care Coordinator Primary Care - CC Admissions 4/23/19     Phone: [phone]         Tanika Govea, RN Clinic Care Coordinator Primary Care - CC Admissions 5/8/19     Phone: [phone] Fax: [phone]               My Medical and Care Information  Problem List   Patient Active Problem List   Diagnosis     Hearing loss     Allergic rhinitis     Migraine     Benign neoplasm of skin of lower limb, including hip     Dysmenorrhea     Crohn's colitis (H)     Mild major depression (H)     Anxiety     Chronic pain     Bipolar disorder (H)     Marijuana abuse     Adjustment disorder with mixed anxiety and depressed mood     Herpes simplex virus infection     Gastroesophageal reflux disease without esophagitis     Bee sting allergy     Mild persistent asthma without complication     Chronic bilateral low back pain with left-sided sciatica     Vitamin D deficiency     Atypical mole     Lumbar disc disease with radiculopathy     S/P lumbar fusion     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)     Upper GI bleed - suspected     Tobacco use disorder     History of pseudoseizure     Iliotibial band syndrome affecting lower leg, right     Chondromalacia of right patellofemoral joint     Melanocytic nevus, unspecified location     Dysplastic skin lesion     Patellar maltracking, right     Right anterior knee pain      Hypophosphatemia     Class 1 obesity due to excess calories without serious comorbidity with body mass index (BMI) of 32.0 to 32.9 in adult     Mild intermittent asthma     Cervicalgia     Bulge of cervical disc without myelopathy      Current Medications and Allergies:  See printed Medication Report

## 2019-05-09 NOTE — PROGRESS NOTES
Clinic Care Coordination Contact  [facility]/Voicemail    Referral Source: ED Follow-Up    Clinical Data: Care Coordinator Outreach, ED f/u 5/6 & 5/7 diarrhea. Hx Crohns disease. Needs to f/u with GI. Has f/u with PCP 5/13/19.     Outreach attempted x 2.  Left message on voicemail with call back information and requested return call.    Plan: Care Coordinator mailed out care coordination introduction letter on 5/8/19. Care Coordinator will do no further outreaches at this time.    Tanika ERICKSON, RN, PHN, [facility]  Primary Care Clinic RN Care Coordinator  [facility]   [email or web address]  Office:  [phone]

## 2019-05-11 ENCOUNTER — TRANSFERRED RECORDS (OUTPATIENT)
Dept: HEALTH INFORMATION MANAGEMENT | Facility: CLINIC | Age: 29
End: 2019-05-11

## 2019-05-11 ENCOUNTER — NURSE TRIAGE (OUTPATIENT)
Dept: NURSING | Facility: CLINIC | Age: 29
End: 2019-05-11

## 2019-05-11 LAB
CREAT SERPL-MCNC: 1.07 MG/DL (ref 0.57–1.11)
GFR SERPL CREATININE-BSD FRML MDRD: >60 ML/MIN/1.73M(2)
GLUCOSE SERPL-MCNC: 116 MG/DL (ref 70–105)
INR PPP: 1 (ref 0.9–1.1)
POTASSIUM SERPL-SCNC: 3.8 MMOL/L (ref 3.5–5.1)

## 2019-05-11 NOTE — TELEPHONE ENCOUNTER
Caller states he has been having chest pain for over an hour and has a familial history of CHF, and caller has elevated cholesterol levels. Caller states he heart rate is elevated as well of 144. Triage guidelines recommend to call 911. Caller verbalized and understands directives.    Reason for Disposition    [1] Chest pain lasts > 5 minutes AND [2] age > 30 AND [3] at least one cardiac risk factor (i.e., hypertension, diabetes, obesity, smoker or strong family history of heart disease)    Additional Information    Negative: Severe difficulty breathing (e.g., struggling for each breath, speaks in single words)    Negative: Difficult to awaken or acting confused (e.g., disoriented, slurred speech)    Negative: Shock suspected (e.g., cold/pale/clammy skin, too weak to stand, low BP, rapid pulse)    Negative: [1] Chest pain lasts > 5 minutes AND [2] history of heart disease  (i.e., heart attack, bypass surgery, angina, angioplasty, CHF; not just a heart murmur)    Negative: [1] Chest pain lasts > 5 minutes AND [2] described as crushing, pressure-like, or heavy    Negative: [1] Chest pain lasts > 5 minutes AND [2] age > 50    Protocols used: CHEST PAIN-A-

## 2019-05-13 ENCOUNTER — OFFICE VISIT (OUTPATIENT)
Dept: FAMILY MEDICINE | Facility: OTHER | Age: 29
End: 2019-05-13
Payer: MEDICARE

## 2019-05-13 ENCOUNTER — TELEPHONE (OUTPATIENT)
Dept: FAMILY MEDICINE | Facility: OTHER | Age: 29
End: 2019-05-13

## 2019-05-13 VITALS
RESPIRATION RATE: 18 BRPM | DIASTOLIC BLOOD PRESSURE: 80 MMHG | WEIGHT: 186 LBS | HEART RATE: 130 BPM | BODY MASS INDEX: 34.01 KG/M2 | OXYGEN SATURATION: 98 % | SYSTOLIC BLOOD PRESSURE: 110 MMHG | TEMPERATURE: 99.8 F

## 2019-05-13 DIAGNOSIS — M50.30 BULGE OF CERVICAL DISC WITHOUT MYELOPATHY: ICD-10-CM

## 2019-05-13 DIAGNOSIS — G89.4 CHRONIC PAIN SYNDROME: ICD-10-CM

## 2019-05-13 DIAGNOSIS — L03.316 CELLULITIS OF UMBILICUS: Primary | ICD-10-CM

## 2019-05-13 DIAGNOSIS — G89.29 CHRONIC BILATERAL LOW BACK PAIN WITH LEFT-SIDED SCIATICA: ICD-10-CM

## 2019-05-13 DIAGNOSIS — M54.42 CHRONIC BILATERAL LOW BACK PAIN WITH LEFT-SIDED SCIATICA: ICD-10-CM

## 2019-05-13 DIAGNOSIS — M51.16 LUMBAR DISC DISEASE WITH RADICULOPATHY: ICD-10-CM

## 2019-05-13 PROCEDURE — 99214 OFFICE O/P EST MOD 30 MIN: CPT | Performed by: PHYSICIAN ASSISTANT

## 2019-05-13 RX ORDER — OXYCODONE HYDROCHLORIDE 5 MG/1
5 TABLET ORAL EVERY 8 HOURS PRN
Qty: 70 TABLET | Refills: 0 | Status: SHIPPED | OUTPATIENT
Start: 2019-05-16 | End: 2019-05-28

## 2019-05-13 RX ORDER — CEPHALEXIN 500 MG/1
500 CAPSULE ORAL 2 TIMES DAILY
Qty: 20 CAPSULE | Refills: 0 | Status: SHIPPED | OUTPATIENT
Start: 2019-05-13 | End: 2019-06-13

## 2019-05-13 ASSESSMENT — PATIENT HEALTH QUESTIONNAIRE - PHQ9
10. IF YOU CHECKED OFF ANY PROBLEMS, HOW DIFFICULT HAVE THESE PROBLEMS MADE IT FOR YOU TO DO YOUR WORK, TAKE CARE OF THINGS AT HOME, OR GET ALONG WITH OTHER PEOPLE: SOMEWHAT DIFFICULT
SUM OF ALL RESPONSES TO PHQ QUESTIONS 1-9: 9
SUM OF ALL RESPONSES TO PHQ QUESTIONS 1-9: 9

## 2019-05-13 ASSESSMENT — ANXIETY QUESTIONNAIRES
4. TROUBLE RELAXING: NEARLY EVERY DAY
3. WORRYING TOO MUCH ABOUT DIFFERENT THINGS: NEARLY EVERY DAY
1. FEELING NERVOUS, ANXIOUS, OR ON EDGE: NEARLY EVERY DAY
5. BEING SO RESTLESS THAT IT IS HARD TO SIT STILL: NOT AT ALL
7. FEELING AFRAID AS IF SOMETHING AWFUL MIGHT HAPPEN: NOT AT ALL
GAD7 TOTAL SCORE: 13
6. BECOMING EASILY ANNOYED OR IRRITABLE: SEVERAL DAYS
7. FEELING AFRAID AS IF SOMETHING AWFUL MIGHT HAPPEN: NOT AT ALL
GAD7 TOTAL SCORE: 13
GAD7 TOTAL SCORE: 13
2. NOT BEING ABLE TO STOP OR CONTROL WORRYING: NEARLY EVERY DAY

## 2019-05-13 ASSESSMENT — PAIN SCALES - GENERAL: PAINLEVEL: SEVERE PAIN (6)

## 2019-05-13 NOTE — TELEPHONE ENCOUNTER
[facility] calling - they said insurance won't pay for this early until tomorrow. They said patient can't pay cash for this either, as it's a state funded plan.     Huddled with [facility] - she states no it is not being filled early. She should have enough at four until Thursday. Patient also didn't go to her physical therapy appointment as directed by CDL at appointment today.    Pharmacist informed.   Abimbola Castro, [facility]

## 2019-05-13 NOTE — PATIENT INSTRUCTIONS
- Recheck 2 weeks     - Keflex twice a day for 10 days      Antibiotic ointment twice a day

## 2019-05-13 NOTE — TELEPHONE ENCOUNTER
Many red flags today. Patient did not appear to be in severe pain during visit. Requesting increase after not starting physical therapy or making her myelogram appointment.     She requested it to be filled early when nurse was finishing up with her and I said she needed to wait until Thursday. She was instructed to go right to her physical therapy appointment today, which she did not. Then all of a sudden she has to leave for out of town and needs filled early.     I am not comfortable with her having an early refill and will need to discuss her behavior at upcoming appointment along with ED concerns of her driving while on narcotics.     Zach Rosario PA-C  [facility]
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
## 2019-05-13 NOTE — TELEPHONE ENCOUNTER
Reason for Call:  Other call back    Detailed comments: Patient called clinic back. I relayed message below about CDL's response to not filling Oxycodone until Thursday. Patient is upset because she needs to go to [place] to see her month right away. Patient said she is at her pharmacy now, but the Oxycodone rx needs to be picked up in clinic.     She also asked if she needed to take her Keflex with food. Please call patient back after speaking with CDL.     Phone Number Patient can be reached at: Home number on file [phone] (home)    Best Time: any    Can we leave a detailed message on this number? YES    Call taken on 5/13/2019 at 11:46 AM by Genie Luo

## 2019-05-13 NOTE — TELEPHONE ENCOUNTER
"Patient called again. The person who took the call didn't feel comfortable telling Katy no again so the call was transferred to myself.  Katy said she was never told CDL wouldn't let her pick them up early but that she would need to pick the script and she would ask cdl to fill early. I told patient we are not refilling early and that you have enough to get until Thursday. She stated she has to leave for out of town that her mom really is not doing well and that she wont be back on Thursday to fill her script. I told her I understand her mom is currently not doing well and I was empathic in saying I was sorry to hear that and that I hoped she gets better. She then told me she needs her meds refilled before she leaves and shed needs me to get CDL to fill them. I began again stating \"Katy I am reading you what Zach said, We unfortunately cannot fill them early.\" She then said \"k, Bydean\" and the  ended the call.   Jenni Luo MA    "

## 2019-05-14 ASSESSMENT — PATIENT HEALTH QUESTIONNAIRE - PHQ9: SUM OF ALL RESPONSES TO PHQ QUESTIONS 1-9: 9

## 2019-05-14 ASSESSMENT — ANXIETY QUESTIONNAIRES: GAD7 TOTAL SCORE: 13

## 2019-05-16 ENCOUNTER — OFFICE VISIT (OUTPATIENT)
Dept: NEUROSURGERY | Facility: OTHER | Age: 29
End: 2019-05-16
Attending: PHYSICIAN ASSISTANT
Payer: MEDICARE

## 2019-05-16 ENCOUNTER — ALLIED HEALTH/NURSE VISIT (OUTPATIENT)
Dept: FAMILY MEDICINE | Facility: OTHER | Age: 29
End: 2019-05-16
Payer: MEDICARE

## 2019-05-16 VITALS — TEMPERATURE: 97.6 F | BODY MASS INDEX: 34.23 KG/M2 | HEIGHT: 62 IN | WEIGHT: 186 LBS

## 2019-05-16 VITALS — SYSTOLIC BLOOD PRESSURE: 120 MMHG | DIASTOLIC BLOOD PRESSURE: 62 MMHG

## 2019-05-16 DIAGNOSIS — M54.16 LUMBAR RADICULOPATHY: Primary | ICD-10-CM

## 2019-05-16 PROCEDURE — 96372 THER/PROPH/DIAG INJ SC/IM: CPT

## 2019-05-16 PROCEDURE — 99203 OFFICE O/P NEW LOW 30 MIN: CPT | Performed by: NURSE PRACTITIONER

## 2019-05-16 PROCEDURE — T1013 SIGN LANG/ORAL INTERPRETER: HCPCS | Mod: U3 | Performed by: NURSE PRACTITIONER

## 2019-05-16 PROCEDURE — 99207 ZZC NO CHARGE NURSE ONLY: CPT

## 2019-05-16 RX ORDER — MEDROXYPROGESTERONE ACETATE 150 MG/ML
150 INJECTION, SUSPENSION INTRAMUSCULAR
Status: DISCONTINUED | OUTPATIENT
Start: 2019-05-16 | End: 2019-06-13

## 2019-05-16 RX ADMIN — MEDROXYPROGESTERONE ACETATE 150 MG: 150 INJECTION, SUSPENSION INTRAMUSCULAR at 10:06

## 2019-05-16 ASSESSMENT — MIFFLIN-ST. JEOR: SCORE: 1516.94
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
## 2019-05-16 NOTE — PROGRESS NOTES
[facility]  Neurosurgery Clinic Visit      CC: low back pain with radiculopathy-    Primary care Provider: Aura Rosario      Reason For Visit:   I was asked by Aura Cronin PA-C to consult on the patient for chronic low back pain with left-sided sciatica.      HPI: Katy Islas is a 29 year old female with a history of low back pain. 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 Jeniffer Eugene on 2/23/2017 after L4-5 discectomy prior on 10/19/2011 with Dr. Sorensen with [facility]. She has also underwent a left SI joint fusion with Dr. Eugene 10/26/2018. Today she presents with 6+ months of worsening low back pain that radiates down the left lateral thigh to the foot. She notes left great toe tingling. She denies right-sided leg pain. She denies overt weakness, bowel/bladder incontinence and foot drop. She states sitting aggravates the pain. Lying in a recliner alleviates the pain. She recently started physical therapy is unsure if she has seen benefit yet. She has not had any recent low back injections.       Pain at its worst 8  Pain right now:  5    Past Medical History:   Diagnosis Date     Abdominal pain 10/31- 11/4/2005    [facility] admit for Crohn's     Allergic rhinitis, cause unspecified     Allergic rhinitis     Arthritis      C. difficile diarrhea     Past, no current diarrhea.     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, without mention of intractable epilepsy     pseudoseizures diagnosed  after extensive neurologic eval     Migraine 07/21/12    D/C 07/22/12-[facility]     Migraine, unspecified, without mention of intractable migraine without mention of status migrainosus     Migraine     Mild intermittent asthma     mild intermittent     Mycoplasma infection in conditions classified elsewhere and of unspecified site      Other chronic pain     Back pain for 6 years     Renal disease     Kidney stones     Unspecified hearing loss     congenital hearing loss       Past Medical History reviewed with patient during visit.    Past Surgical History:   Procedure Laterality Date     AS REMOVAL OF COCCYX  10/18/2017     C FUSION OF SACROILIAC JOINT  10/26/2018     COLONOSCOPY  7/1/2009    [facility]     COMBINED ESOPHAGOSCOPY, GASTROSCOPY, DUODENOSCOPY (EGD) WITH CO2 INSUFFLATION N/A 4/12/2019    Procedure: COMBINED ESOPHAGOSCOPY, GASTROSCOPY, DUODENOSCOPY (EGD) WITH CO2 INSUFFLATION;  Surgeon: Edwin Conde MD;  Location: [facility] OR     ESOPHAGOSCOPY, GASTROSCOPY, DUODENOSCOPY (EGD), COMBINED N/A 4/12/2019    Procedure: Combined Esophagoscopy, Gastroscopy, Duodenoscopy (Egd), Biopsy Single Or Multiple;  Surgeon: Edwin Conde MD;  Location: [facility] OR     FUSION SPINE POSTERIOR MINIMALLY INVASIVE ONE LEVEL N/A 2/23/2017    Procedure: FUSION SPINE POSTERIOR MINIMALLY INVASIVE ONE LEVEL;  L4-5 Oblique Lateral Lumbar Interbody Fusion   Epidural steroid injection.   Transpedicular Bone marrow aspiration;  Surgeon: Jeniffer Eugene MD;  Location: [facility] OR     HC COLONOSCOPY THRU STOMA WITH BIOPSY  10/29/2003    Impression is that of normal appearing colonoscopy, without evidence of rectal bleeding.     HC COLONOSCOPY THRU STOMA, DIAGNOSTIC  10/00    normal     HC COLONOSCOPY THRU STOMA, DIAGNOSTIC  Oct 2009    Dr López- normal     HC EEG AWAKE AND SLEEP      abnormal     HC MRI BRAIN W/O CONTRAST  12/00    normal     HC REMOVAL GALLBLADDER  8/5/2009    [facility]  [place], [place].     HC UGI ENDOSCOPY DIAG W BIOPSY  11/11/09    Normal esophagus     HC UGI ENDOSCOPY, SIMPLE EXAM  7/00, 10/00    mild chronic esophagitis and duodenitis, neg H pylori     HC UGI ENDOSCOPY, SIMPLE EXAM  01/20/2005    Esophagogastroduodenoscopy, colonoscopy with biopsies.  [facility] UGI ENDOSCOPY, SIMPLE EXAM  7/1/2009    [facility], [place].      UGI ENDOSCOPY, SIMPLE EXAM  11/11/2009    attempted upper GI, pt. could not tolerate procedure:[facility]     ORTHOPEDIC SURGERY  October 19,2011    diskectomy L4-L5     Past Surgical History reviewed with patient during visit.    Current Outpatient Medications   Medication     acetaminophen (TYLENOL) 500 MG tablet     albuterol (2.5 MG/3ML) 0.083% neb solution     albuterol (PROAIR HFA/PROVENTIL HFA/VENTOLIN HFA) 108 (90 Base) MCG/ACT Inhaler     ARIPiprazole (ABILIFY) 15 MG tablet     busPIRone (BUSPAR) 15 MG tablet     cephALEXin (KEFLEX) 500 MG capsule     clonazePAM (KLONOPIN) 0.5 MG tablet     cyclobenzaprine (FLEXERIL) 10 MG tablet     dicyclomine (BENTYL) 20 MG tablet     diphenhydrAMINE (BENADRYL) 50 MG capsule     DULoxetine (CYMBALTA) 60 MG EC capsule     EPINEPHrine (EPIPEN/ADRENACLICK/OR ANY BX GENERIC EQUIV) 0.3 MG/0.3ML injection 2-pack     gabapentin (NEURONTIN) 300 MG capsule     levocetirizine (XYZAL) 5 MG tablet     meclizine (ANTIVERT) 12.5 MG tablet     medical cannabis (Patient's own supply)     medroxyPROGESTERone (DEPO-PROVERA) 150 MG/ML IM injection     mupirocin (BACTROBAN) 2 % external ointment     NONFORMULARY     nystatin (MYCOSTATIN) cream     oxyCODONE (ROXICODONE) 5 MG tablet     OXYCODONE-ACETAMINOPHEN PO     pantoprazole (PROTONIX) 40 MG EC tablet     polyethylene glycol (MIRALAX/GLYCOLAX) powder     prazosin (MINIPRESS) 1 MG capsule     prochlorperazine (COMPAZINE) 10 MG tablet     prochlorperazine (COMPAZINE) 25 MG suppository     promethazine (PHENERGAN) 25 MG tablet      rizatriptan (MAXALT-MLT) 5 MG ODT tab     sucralfate (CARAFATE) 1 GM tablet     varenicline (CHANTIX) 1 MG tablet     No current facility-administered medications for this visit.        Allergies   Allergen Reactions     Iohexol Hives     Other reaction(s): Hives  IV contrast; patient states she tolerates contrast if she gets benadryl before  IV contrast; patient states she tolerates contrast if she gets benadryl before     Ativan [Lorazepam] Hives     At the IV site     Baclofen      hives     Bees Hives, Swelling and Difficulty breathing     Caffeine      Contrast Dye      Hives,   Updated 5/10/2016 CT Contrast.     Iodine Hives     Methocarbamol Swelling     Metoclopramide      Other reaction(s): Tremors  LW Reaction: shaking/sweating     Midazolam      Other reaction(s): Agitation     Monosodium Glutamate      Morphine Other (See Comments)     Difficulty with urination     Nsaids Other (See Comments)     GI BLEED x2     Other (Do Not Use) Other (See Comments)     Xanaflex- pt becomes disoriented and loses bladder control     Reglan [Metoclopramide Hcl] Other (See Comments)     shaking     Soma [Carisoprodol] Visual Disturbance     Sleep walking     Tizanidine      Topamax Other (See Comments)     Topamax [Topiramate] Nausea     Tingling  GI/Vomit     Tramadol      Severe Headache, Seizure Risk     Tylenol W/Codeine [Acetaminophen-Codeine] Nausea and Itching     Tylenol 3     Valium Other (See Comments)     Becomes aggressive and angry     Versed Other (See Comments)     Zolmitriptan      Makes face feel like its twitching     Droperidol Anxiety     Flu Virus Vaccine Rash      Arm swelling       Social History     Socioeconomic History     Marital status: Single     Spouse name: None     Number of children: None     Years of education: None     Highest education level: None   Occupational History     None   Social Needs     Financial resource strain: None     Food insecurity:     Worry: None     Inability: None      Transportation needs:     Medical: None     Non-medical: None   Tobacco Use     Smoking status: Current Every Day Smoker     Packs/day: 0.50     Years: 5.00     Pack years: 2.50     Types: Cigarettes     Smokeless tobacco: Never Used     Tobacco comment: chantix   Substance and Sexual Activity     Alcohol use: Not Currently     Comment: Not since last July     Drug use: Yes     Types: Marijuana     Comment: Medical Marijuana currently-- More CDB      Sexual activity: Not Currently     Partners: Male     Birth control/protection: Condom, Injection   Lifestyle     Physical activity:     Days per week: None     Minutes per session: None     Stress: None   Relationships     Social connections:     Talks on phone: None     Gets together: None     Attends Protestant service: None     Active member of club or organization: None     Attends meetings of clubs or organizations: None     Relationship status: None     Intimate partner violence:     Fear of current or ex partner: None     Emotionally abused: None     Physically abused: None     Forced sexual activity: None   Other Topics Concern      Service No     Blood Transfusions No     Caffeine Concern No     Occupational Exposure No     Hobby Hazards No     Sleep Concern No     Stress Concern No     Weight Concern No     Special Diet No     Back Care No     Exercise Yes     Bike Helmet No     Seat Belt Yes     Self-Exams Yes     Parent/sibling w/ CABG, MI or angioplasty before 65F 55M? Yes     Comment: father late 30's early 40's    Social History Narrative     None       Family History   Problem Relation Age of Onset     Gastrointestinal Disease Brother         severe Crohn's     Neurologic Disorder Brother         Seizures post head injury     Depression Brother      Substance Abuse Brother      Genitourinary Problems Father         kidney stones     Diabetes Father      Heart Disease Father         Open heart surgery     Breast Cancer Maternal Grandmother       Parkinsonism Maternal Grandmother      Cerebrovascular Disease Paternal Grandmother      Cancer Maternal Grandfather         Lung     Cardiovascular Paternal Grandfather         Heart Attack     Substance Abuse Mother      Lung Cancer Paternal Uncle      Cancer Paternal Uncle      Lung Cancer Maternal Uncle          ROS: 10 point ROS neg other than the symptoms noted above in the HPI.    Vital Signs:       Examination:  Constitutional:  Alert, well nourished, NAD.  Memory: recent and remote memory   HEENT: Normocephalic, atraumatic.   Pulm:  Without shortness of breath   CV:  No pitting edema of BLE.    Neurological:  Awake  Alert  Oriented x 3  Speech clear  Motor exam   Shoulder Abduction:  Right:  5/5   Left:  5/5  Biceps:                      Right:  5/5   Left:  5/5  Triceps:                     Right:  5/5   Left:  5/5  Wrist Extensors:       Right:  5/5   Left:  5/5  Wrist Flexors:           Right:  5/5   Left:  5/5  Intrinsics:                   Right:  5/5   Left:  5/5   Hip Flexor:                Right: 5/5  Left:  5/5  Hip Adductor:             Right:  5/5  Left:  5/5  Hip Abductor:             Right:  5/5  Left:  5/5  Gastroc Soleus:        Right:  5/5  Left:  5/5  Tib/Ant:                      Right:  5/5  Left:  5/5  EHL:                          Right:  5/5  Left:  5/5   Sensation decreased to LLE  Muscle tone to bilateral upper and lower extremities   Gait: Able to stand from a seated position. Normal non-antalgic, non-myelopathic gait.  Able to heel/toe walk without loss of balance    Lumbar examination reveals tenderness of the spine and left paraspinous muscles.  Hip height is symmetrical. Negative SI joint, sciatic notch or greater trochanteric tenderness to palpation bilaterally.  Straight leg raise is negative bilaterally.      Imaging: Lumbar MRI 10/2/2018  1. Previous anterior and posterior lumbar fusion at L4-5 with stable hardware.  2. No central lucia neural foraminal narrowing at any  level. No significant interval change since 5/12/2018.    Assessment/Plan:   Katy Islas is a 29 year old female with a history of low back pain. 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 Jeniffer Eugene on 2/23/2017 after L4-5 discectomy prior on 10/19/2011 with Dr. Sorensen with [facility]. She has also underwent a left SI joint fusion with Dr. Eugene 10/26/2018. Today she presents with 6+ months of worsening low back pain that radiates down the left lateral thigh to the foot. She notes left great toe tingling. She denies right-sided leg pain. She denies overt weakness, bowel/bladder incontinence and foot drop.Given progressive symptoms s/p lumbar fusion recommended repeat lumbar MRI. She would like to do this at [facility] in [place]. Referral faxed. Instructed her to contact the clinic to set up a follow up appointment to discuss results when MRI has been completed. She verbalized understanding and agreement.       Patient Instructions   -Lumbar MRI ordered. The referral will be faxed to [facility] in [place]. Please make an appointment to follow up in clinic when you have completed the MRI.   -Please contact the clinic if pain persists at [phone].        Shazia Chadwick, CNP  [facility]  [address] [address]    Tel [phone]  Pager [phone]

## 2019-05-16 NOTE — PROGRESS NOTES
Prior to injection, verified patient identity using patient's name and date of birth.  Due to injection administration, patient instructed to remain in clinic for 15 minutes  afterwards, and to report any adverse reaction to me immediately.    BP: 120/62    LAST PAP/EXAM:   Lab Results   Component Value Date    PAP NIL 04/25/2017     URINE HCG:not indicated    NEXT INJECTION DUE: 8/1/19 - 8/15/19       Drug Amount Wasted:  None.  Vial/Syringe: Single dose vial  Expiration Date:  2/2020  Katy Sheikh CMA

## 2019-05-16 NOTE — LETTER
5/16/2019         RE: Katy Islas  [address]  [place] [address]        Dear Colleague,    Thank you for referring your patient, Katy Islas, to the [facility]. Please see a copy of my visit note below.    [facility]  Neurosurgery Clinic Visit      CC: low back pain with radiculopathy-    Primary care Provider: Aura Rosario      Reason For Visit:   I was asked by Aura Cronin PA-C to consult on the patient for chronic low back pain with left-sided sciatica.      HPI: Katy Islas is a 29 year old female with a history of low back pain. 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 Jeniffer Eugene on 2/23/2017 after L4-5 discectomy prior on 10/19/2011 with Dr. Sorensen with [facility]. She has also underwent a left SI joint fusion with Dr. Eugene 10/26/2018. Today she presents with 6+ months of worsening low back pain that radiates down the left lateral thigh to the foot. She notes left great toe tingling. She denies right-sided leg pain. She denies overt weakness, bowel/bladder incontinence and foot drop. She states sitting aggravates the pain. Lying in a recliner alleviates the pain. She recently started physical therapy is unsure if she has seen benefit yet. She has not had any recent low back injections.       Pain at its worst 8  Pain right now:  5    Past Medical History:   Diagnosis Date     Abdominal pain 10/31- 11/4/2005    [facility] admit for Crohn's     Allergic rhinitis, cause unspecified     Allergic rhinitis     Arthritis      C. difficile diarrhea     Past, no current diarrhea.     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, without mention of intractable epilepsy     pseudoseizures diagnosed after extensive neurologic eval     Migraine 07/21/12    D/C 07/22/12-[facility]     Migraine, unspecified, without mention of intractable migraine without mention of status migrainosus     Migraine     Mild intermittent asthma     mild intermittent     Mycoplasma infection in conditions classified elsewhere and of unspecified site      Other chronic pain     Back pain for 6 years     Renal disease     Kidney stones     Unspecified hearing loss     congenital hearing loss       Past Medical History reviewed with patient during visit.    Past Surgical History:   Procedure Laterality Date     AS REMOVAL OF COCCYX  10/18/2017     C FUSION OF SACROILIAC JOINT  10/26/2018     COLONOSCOPY  7/1/2009    [facility].     COMBINED ESOPHAGOSCOPY, GASTROSCOPY, DUODENOSCOPY (EGD) WITH CO2 INSUFFLATION N/A 4/12/2019    Procedure: COMBINED ESOPHAGOSCOPY, GASTROSCOPY, DUODENOSCOPY (EGD) WITH CO2 INSUFFLATION;  Surgeon: Edwin Conde MD;  Location: [facility] OR     ESOPHAGOSCOPY, GASTROSCOPY, DUODENOSCOPY (EGD), COMBINED N/A 4/12/2019    Procedure: Combined Esophagoscopy, Gastroscopy, Duodenoscopy (Egd), Biopsy Single Or Multiple;  Surgeon: Edwin Conde MD;  Location: [facility] OR     FUSION SPINE POSTERIOR MINIMALLY INVASIVE ONE LEVEL N/A 2/23/2017    Procedure: FUSION SPINE POSTERIOR MINIMALLY INVASIVE ONE LEVEL;  L4-5 Oblique Lateral Lumbar Interbody Fusion   Epidural steroid injection.   Transpedicular Bone marrow aspiration;  Surgeon: Jeniffer Eugene MD;  Location: [facility] OR     HC COLONOSCOPY THRU STOMA WITH BIOPSY  10/29/2003    Impression is that of normal appearing colonoscopy, without evidence of rectal bleeding.     HC COLONOSCOPY THRU STOMA,  DIAGNOSTIC  10/00    normal     HC COLONOSCOPY THRU STOMA, DIAGNOSTIC  Oct 2009    Dr Lpóez- normal     HC EEG AWAKE AND SLEEP      abnormal     HC MRI BRAIN W/O CONTRAST  12/00    normal     HC REMOVAL GALLBLADDER  8/5/2009    [facility], [facility].     HC UGI ENDOSCOPY DIAG W BIOPSY  11/11/09    Normal esophagus     HC UGI ENDOSCOPY, SIMPLE EXAM  7/00, 10/00    mild chronic esophagitis and duodenitis, neg H pylori     HC UGI ENDOSCOPY, SIMPLE EXAM  01/20/2005    Esophagogastroduodenoscopy, colonoscopy with biopsies.  [facility]     HC UGI ENDOSCOPY, SIMPLE EXAM  7/1/2009    [facility], [facility].     HC UGI ENDOSCOPY, SIMPLE EXAM  11/11/2009    attempted upper GI, pt. could not tolerate procedure:[facility]     ORTHOPEDIC SURGERY  October 19,2011    diskectomy L4-L5     Past Surgical History reviewed with patient during visit.    Current Outpatient Medications   Medication     acetaminophen (TYLENOL) 500 MG tablet     albuterol (2.5 MG/3ML) 0.083% neb solution     albuterol (PROAIR HFA/PROVENTIL HFA/VENTOLIN HFA) 108 (90 Base) MCG/ACT Inhaler     ARIPiprazole (ABILIFY) 15 MG tablet     busPIRone (BUSPAR) 15 MG tablet     cephALEXin (KEFLEX) 500 MG capsule     clonazePAM (KLONOPIN) 0.5 MG tablet     cyclobenzaprine (FLEXERIL) 10 MG tablet     dicyclomine (BENTYL) 20 MG tablet     diphenhydrAMINE (BENADRYL) 50 MG capsule     DULoxetine (CYMBALTA) 60 MG EC capsule     EPINEPHrine (EPIPEN/ADRENACLICK/OR ANY BX GENERIC EQUIV) 0.3 MG/0.3ML injection 2-pack     gabapentin (NEURONTIN) 300 MG capsule     levocetirizine (XYZAL) 5 MG tablet     meclizine (ANTIVERT) 12.5 MG tablet     medical cannabis (Patient's own supply)     medroxyPROGESTERone (DEPO-PROVERA) 150 MG/ML IM injection     mupirocin (BACTROBAN) 2 % external ointment     NONFORMULARY     nystatin (MYCOSTATIN) cream     oxyCODONE (ROXICODONE) 5 MG tablet     OXYCODONE-ACETAMINOPHEN PO     pantoprazole (PROTONIX)  40 MG EC tablet     polyethylene glycol (MIRALAX/GLYCOLAX) powder     prazosin (MINIPRESS) 1 MG capsule     prochlorperazine (COMPAZINE) 10 MG tablet     prochlorperazine (COMPAZINE) 25 MG suppository     promethazine (PHENERGAN) 25 MG tablet     rizatriptan (MAXALT-MLT) 5 MG ODT tab     sucralfate (CARAFATE) 1 GM tablet     varenicline (CHANTIX) 1 MG tablet     No current facility-administered medications for this visit.        Allergies   Allergen Reactions     Iohexol Hives     Other reaction(s): Hives  IV contrast; patient states she tolerates contrast if she gets benadryl before  IV contrast; patient states she tolerates contrast if she gets benadryl before     Ativan [Lorazepam] Hives     At the IV site     Baclofen      hives     Bees Hives, Swelling and Difficulty breathing     Caffeine      Contrast Dye      Hives,   Updated 5/10/2016 CT Contrast.     Iodine Hives     Methocarbamol Swelling     Metoclopramide      Other reaction(s): Tremors  LW Reaction: shaking/sweating     Midazolam      Other reaction(s): Agitation     Monosodium Glutamate      Morphine Other (See Comments)     Difficulty with urination     Nsaids Other (See Comments)     GI BLEED x2     Other (Do Not Use) Other (See Comments)     Xanaflex- pt becomes disoriented and loses bladder control     Reglan [Metoclopramide Hcl] Other (See Comments)     shaking     Soma [Carisoprodol] Visual Disturbance     Sleep walking     Tizanidine      Topamax Other (See Comments)     Topamax [Topiramate] Nausea     Tingling  GI/Vomit     Tramadol      Severe Headache, Seizure Risk     Tylenol W/Codeine [Acetaminophen-Codeine] Nausea and Itching     Tylenol 3     Valium Other (See Comments)     Becomes aggressive and angry     Versed Other (See Comments)     Zolmitriptan      Makes face feel like its twitching     Droperidol Anxiety     Flu Virus Vaccine Rash      Arm swelling       Social History     Socioeconomic History     Marital status: Single      Spouse name: None     Number of children: None     Years of education: None     Highest education level: None   Occupational History     None   Social Needs     Financial resource strain: None     Food insecurity:     Worry: None     Inability: None     Transportation needs:     Medical: None     Non-medical: None   Tobacco Use     Smoking status: Current Every Day Smoker     Packs/day: 0.50     Years: 5.00     Pack years: 2.50     Types: Cigarettes     Smokeless tobacco: Never Used     Tobacco comment: chantix   Substance and Sexual Activity     Alcohol use: Not Currently     Comment: Not since last July     Drug use: Yes     Types: Marijuana     Comment: Medical Marijuana currently-- More CD[facility]      Sexual activity: Not Currently     Partners: Male     Birth control/protection: Condom, Injection   Lifestyle     Physical activity:     Days per week: None     Minutes per session: None     Stress: None   Relationships     Social connections:     Talks on phone: None     Gets together: None     Attends [employer] service: None     Active member of club or organization: None     Attends meetings of clubs or organizations: None     Relationship status: None     Intimate partner violence:     Fear of current or ex partner: None     Emotionally abused: None     Physically abused: None     Forced sexual activity: None   Other Topics Concern      Service No     Blood Transfusions No     Caffeine Concern No     Occupational Exposure No     Hobby Hazards No     Sleep Concern No     Stress Concern No     Weight Concern No     Special Diet No     Back Care No     Exercise Yes     Bike Helmet No     Seat Belt Yes     Self-Exams Yes     Parent/sibling w/ CABG, MI or angioplasty before 65F 55M? Yes     Comment: father late 30's early 40's    Social History Narrative     None       Family History   Problem Relation Age of Onset     Gastrointestinal Disease Brother         severe Crohn's     Neurologic Disorder Brother          Seizures post head injury     Depression Brother      Substance Abuse Brother      Genitourinary Problems Father         kidney stones     Diabetes Father      Heart Disease Father         Open heart surgery     Breast Cancer Maternal Grandmother      Parkinsonism Maternal Grandmother      Cerebrovascular Disease Paternal Grandmother      Cancer Maternal Grandfather         Lung     Cardiovascular Paternal Grandfather         Heart Attack     Substance Abuse Mother      Lung Cancer Paternal Uncle      Cancer Paternal Uncle      Lung Cancer Maternal Uncle          ROS: 10 point ROS neg other than the symptoms noted above in the HPI.    Vital Signs:       Examination:  Constitutional:  Alert, well nourished, NAD.  Memory: recent and remote memory   HEENT: Normocephalic, atraumatic.   Pulm:  Without shortness of breath   CV:  No pitting edema of BLE.    Neurological:  Awake  Alert  Oriented x 3  Speech clear  Motor exam   Shoulder Abduction:  Right:  5/5   Left:  5/5  Biceps:                      Right:  5/5   Left:  5/5  Triceps:                     Right:  5/5   Left:  5/5  Wrist Extensors:       Right:  5/5   Left:  5/5  Wrist Flexors:           Right:  5/5   Left:  5/5  Intrinsics:                   Right:  5/5   Left:  5/5   Hip Flexor:                Right: 5/5  Left:  5/5  Hip Adductor:             Right:  5/5  Left:  5/5  Hip Abductor:             Right:  5/5  Left:  5/5  Gastroc Soleus:        Right:  5/5  Left:  5/5  Tib/Ant:                      Right:  5/5  Left:  5/5  EHL:                          Right:  5/5  Left:  5/5   Sensation decreased to LLE  Muscle tone to bilateral upper and lower extremities   Gait: Able to stand from a seated position. Normal non-antalgic, non-myelopathic gait.  Able to heel/toe walk without loss of balance    Lumbar examination reveals tenderness of the spine and left paraspinous muscles.  Hip height is symmetrical. Negative SI joint, sciatic notch or greater trochanteric  tenderness to palpation bilaterally.  Straight leg raise is negative bilaterally.      Imaging: Lumbar MRI 10/2/2018  1. Previous anterior and posterior lumbar fusion at L4-5 with stable hardware.  2. No central lucia neural foraminal narrowing at any level. No significant interval change since 5/12/2018.    Assessment/Plan:   Katy Islas is a 29 year old female with a history of low back pain. 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 Jeniffer Eugene on 2/23/2017 after L4-5 discectomy prior on 10/19/2011 with Dr. Sorensen with [facility]. She has also underwent a left SI joint fusion with Dr. Eugene 10/26/2018. Today she presents with 6+ months of worsening low back pain that radiates down the left lateral thigh to the foot. She notes left great toe tingling. She denies right-sided leg pain. She denies overt weakness, bowel/bladder incontinence and foot drop.Given progressive symptoms s/p lumbar fusion recommended repeat lumbar MRI. She would like to do this at [facility] in [place]. Referral faxed. Instructed her to contact the clinic to set up a follow up appointment to discuss results when MRI has been completed. She verbalized understanding and agreement.       Patient Instructions   -Lumbar MRI ordered. The referral will be faxed to [facility] in [place]. Please make an appointment to follow up in clinic when you have completed the MRI.   -Please contact the clinic if pain persists at [phone].        Shazia Chadwick CNP  [facility]  [address] [address]    Tel [phone]  Pager [phone]      Again, thank you for allowing me to participate in the care of your patient.        Sincerely,        Shazia Chadwick NP

## 2019-05-16 NOTE — PATIENT INSTRUCTIONS
-Lumbar MRI ordered. The referral will be faxed to [facility] in [place]. Please make an appointment to follow up in clinic when you have completed the MRI.   -Please contact the clinic if pain persists at [phone].

## 2019-05-20 ENCOUNTER — TELEPHONE (OUTPATIENT)
Dept: PHYSICAL THERAPY | Facility: CLINIC | Age: 29
End: 2019-05-20

## 2019-05-25 ENCOUNTER — TRANSFERRED RECORDS (OUTPATIENT)
Dept: HEALTH INFORMATION MANAGEMENT | Facility: CLINIC | Age: 29
End: 2019-05-25

## 2019-05-25 LAB
ALT SERPL-CCNC: 15 U/L (ref 8–45)
AST SERPL-CCNC: 13 U/L (ref 5–41)
CREAT SERPL-MCNC: 0.82 MG/DL (ref 0.57–1.11)
GFR SERPL CREATININE-BSD FRML MDRD: >60 ML/MIN/1.73M2
GLUCOSE SERPL-MCNC: 90 MG/DL (ref 70–105)
POTASSIUM SERPL-SCNC: 4.2 MMOL/L (ref 3.5–5.1)

## 2019-05-28 ENCOUNTER — APPOINTMENT (OUTPATIENT)
Dept: LAB | Facility: OTHER | Age: 29
End: 2019-05-28
Payer: MEDICARE

## 2019-05-28 ENCOUNTER — OFFICE VISIT (OUTPATIENT)
Dept: FAMILY MEDICINE | Facility: OTHER | Age: 29
End: 2019-05-28
Payer: MEDICARE

## 2019-05-28 ENCOUNTER — TELEPHONE (OUTPATIENT)
Dept: FAMILY MEDICINE | Facility: OTHER | Age: 29
End: 2019-05-28

## 2019-05-28 VITALS
WEIGHT: 182.6 LBS | TEMPERATURE: 98.7 F | HEART RATE: 92 BPM | DIASTOLIC BLOOD PRESSURE: 82 MMHG | BODY MASS INDEX: 33.4 KG/M2 | SYSTOLIC BLOOD PRESSURE: 124 MMHG | OXYGEN SATURATION: 100 %

## 2019-05-28 DIAGNOSIS — M50.30 BULGE OF CERVICAL DISC WITHOUT MYELOPATHY: ICD-10-CM

## 2019-05-28 DIAGNOSIS — G89.4 CHRONIC PAIN SYNDROME: ICD-10-CM

## 2019-05-28 DIAGNOSIS — R30.0 DYSURIA: Primary | ICD-10-CM

## 2019-05-28 DIAGNOSIS — K29.01 ACUTE GASTRITIS WITH HEMORRHAGE, UNSPECIFIED GASTRITIS TYPE: ICD-10-CM

## 2019-05-28 DIAGNOSIS — K92.0 HEMATEMESIS WITH NAUSEA: ICD-10-CM

## 2019-05-28 DIAGNOSIS — M51.16 LUMBAR DISC DISEASE WITH RADICULOPATHY: ICD-10-CM

## 2019-05-28 DIAGNOSIS — N89.8 VAGINAL ODOR: ICD-10-CM

## 2019-05-28 DIAGNOSIS — Z98.1 S/P LUMBAR FUSION: ICD-10-CM

## 2019-05-28 LAB
ALBUMIN UR-MCNC: NEGATIVE MG/DL
APPEARANCE UR: CLEAR
BACTERIA #/AREA URNS HPF: ABNORMAL /HPF
BILIRUB UR QL STRIP: NEGATIVE
COLOR UR AUTO: YELLOW
GLUCOSE UR STRIP-MCNC: NEGATIVE MG/DL
HGB UR QL STRIP: ABNORMAL
KETONES UR STRIP-MCNC: NEGATIVE MG/DL
LEUKOCYTE ESTERASE UR QL STRIP: NEGATIVE
NITRATE UR QL: NEGATIVE
NON-SQ EPI CELLS #/AREA URNS LPF: ABNORMAL /LPF
PH UR STRIP: 5.5 PH (ref 5–7)
RBC #/AREA URNS AUTO: ABNORMAL /HPF
SOURCE: ABNORMAL
SP GR UR STRIP: 1.02 (ref 1–1.03)
SPECIMEN SOURCE: NORMAL
UROBILINOGEN UR STRIP-ACNC: 0.2 EU/DL (ref 0.2–1)
WBC #/AREA URNS AUTO: ABNORMAL /HPF
WET PREP SPEC: NORMAL

## 2019-05-28 PROCEDURE — 87210 SMEAR WET MOUNT SALINE/INK: CPT | Performed by: PHYSICIAN ASSISTANT

## 2019-05-28 PROCEDURE — 81001 URINALYSIS AUTO W/SCOPE: CPT | Performed by: PHYSICIAN ASSISTANT

## 2019-05-28 PROCEDURE — 99214 OFFICE O/P EST MOD 30 MIN: CPT | Performed by: PHYSICIAN ASSISTANT

## 2019-05-28 RX ORDER — OXYCODONE HYDROCHLORIDE 5 MG/1
5 TABLET ORAL EVERY 8 HOURS PRN
Qty: 70 TABLET | Refills: 0 | Status: SHIPPED | OUTPATIENT
Start: 2019-05-31 | End: 2019-06-13

## 2019-05-28 RX ORDER — SUCRALFATE 1 G/1
1 TABLET ORAL 4 TIMES DAILY PRN
Qty: 360 TABLET | Refills: 3 | Status: SHIPPED | OUTPATIENT
Start: 2019-05-28 | End: 2021-01-19

## 2019-05-28 ASSESSMENT — PAIN SCALES - GENERAL: PAINLEVEL: SEVERE PAIN (6)

## 2019-05-28 NOTE — TELEPHONE ENCOUNTER
Patient would like to go to physical therapy for her neck and low  to home in [place]. Is very close to [facility].     I have signed general physical therapy order. Please find information on location for her including a phone number and send her a MyChart with the information. Also please fax referral.     Zach Rosario PA-C  [facility]

## 2019-05-28 NOTE — PATIENT INSTRUCTIONS
- Await my message for physical therapy     - Call [facility] for myelogram and MRI of lumbar spine      Schedule with neurosurgery 2-3 days after MRI         Shazia [phone]    - Recheck 2 weeks

## 2019-05-28 NOTE — TELEPHONE ENCOUNTER
[facility] does not have PT, they use [facility].  Per v/o changed to [facility].  Info faxed to them to schedule patient and notified patient w/info through her my chart.

## 2019-05-28 NOTE — RESULT ENCOUNTER NOTE
Savana Burns    Your wet prep vaginal swab results were normal.     The results are attached for your review.       Zach Rosario PA-C

## 2019-05-30 ENCOUNTER — PATIENT OUTREACH (OUTPATIENT)
Dept: CARE COORDINATION | Facility: CLINIC | Age: 29
End: 2019-05-30

## 2019-05-30 NOTE — PROGRESS NOTES
Clinic Care Coordination Contact  [facility]/Voicemail       Clinical Data: Care Coordinator Outreach- f/u with pt to see if had any other questions regarding financial resources gave and ensure is getting help with her bills/medical expenses  Outreach attempted x 1.  Left message on voicemail with call back information and requested return call.  Plan: Care Coordinator mailed out care coordination introduction letter on 5/8/19. Care Coordinator will try to reach patient again in 3-5 business days.      DOYLE Torres   Primary Care Clinic- Social Work Care Coordinator  [facility]  5/30/2019 3:33 PM  [phone]

## 2019-06-03 ENCOUNTER — MYC MEDICAL ADVICE (OUTPATIENT)
Dept: FAMILY MEDICINE | Facility: OTHER | Age: 29
End: 2019-06-03

## 2019-06-03 NOTE — TELEPHONE ENCOUNTER
It looks mildly inflamed likely from the itching. I would recommend that if she is really worried, having fevers/chills, worsening redness or swelling she be seen in urgent care setting. She could try some antibiotic ointment and band aid to help healing process.     NATA Retana CNP

## 2019-06-05 NOTE — PROGRESS NOTES
Clinic Care Coordination Contact    SW CC sent pt message via [employer] to see if has any other questions or CC needs for SW CC.       DOYLE Torres   Primary Care Clinic- Social Work Care Coordinator  [facility]  6/5/2019 2:53 PM  [phone]

## 2019-06-06 NOTE — PROGRESS NOTES
Subjective     Katy Islas is a 29 year old female who presents to clinic today for the following health issues:    HPI   Chronic Pain Follow-Up  Type / Location of Pain: whole back   Analgesia/pain control:       Recent changes:  Worse: sore from pt, it takes time to get stronger from pt then she will feel better.       Overall control: Tolerable with discomfort  Activity level/function:      Daily activities:  Able to do moderate activities and Can do most things most days, with some rest    Work:  not applicable  Adverse effects:  No  Adherance    Taking medication as directed?  Yes    Participating in other treatments: yes: medical marijuana and PT   Risk Factors:    Sleep:  Fair    Mood/anxiety:  worsened    Recent family or social stressors:  Mom had car accident     Other aggravating factors: activity  and lifting  PHQ-9 SCORE 4/3/2019 5/13/2019 6/13/2019   PHQ-9 Total Score - - -   PHQ-9 Total Score MyChart 7 (Mild depression) 9 (Mild depression) 7 (Mild depression)   PHQ-9 Total Score 7 9 7     JULIETH-7 SCORE 4/3/2019 5/13/2019 6/13/2019   Total Score - - -   Total Score 17 (severe anxiety) 13 (moderate anxiety) Incomplete   Total Score 17 13 10     Encounter-Level CSA - 04/25/2017:    Controlled Substance Agreement - Scan on 5/1/2017 11:06 AM: CONTROLLED SUBSTANCE AGREEMENT (below)       Encounter-Level CSA - 01/12/2015:    Controlled Substance Agreement - Scan on 1/26/2015  9:14 AM: Controlled Medication Agreement-01/12/15 (below)       Patient-Level CSA:    Controlled Substance Agreement - Opioid - Scan on 4/24/2019  2:39 PM: Opiod/Opiod Plus Controlled Substance Agreement 04/24/19 (below)           Amount of exercise or physical activity: 6-7 days/week for an average of 30-45 minutes    Problems taking medications regularly: No    Medication side effects: maybe one makes her sleep walk: shes found food in a different cabinet and has sent her mom really weird texts     Diet: regular (no  "restrictions)        Reviewed and updated as needed this visit by Provider  Allergies  Meds  Problems  Med Hx  Surg Hx         Review of Systems   ROS COMP: Constitutional, HEENT, cardiovascular, pulmonary, gi and gu systems are negative, except as otherwise noted.      Objective    /82   Pulse 98   Temp 98.1  F (36.7  C) (Temporal)   Resp 14   Ht 1.575 m (5' 2\")   Wt 81.2 kg (179 lb)   SpO2 98%   BMI 32.74 kg/m    Body mass index is 32.74 kg/m .  Physical Exam   GENERAL APPEARANCE: healthy, alert and no distress  EYES: Eyes grossly normal to inspection, PERRLA, conjunctivae and sclerae without injection or discharge, EOM intact   RESP: Lungs clear to auscultation - no rales, rhonchi or wheezes    CV: Regular rates and rhythm, normal S1 S2, no S3 or S4, no murmur, click or rub, no peripheral edema and peripheral pulses strong and symmetric bilaterally   MS: No musculoskeletal defects are noted and gait is age appropriate without ataxia   SKIN: No suspicious lesions or rashes, hydration status appears adeuqate with normal skin turgor   BACK: Deferred   PSYCH: Alert and oriented x3; speech- coherent , normal rate and volume; able to articulate logical thoughts, able to abstract reason, no tangential thoughts, no hallucinations or delusions, mentation appears normal, Mood is euthymic. Affect is appropriate for this mood state and bright. Thought content is free of suicidal ideation, hallucinations, and delusions. Dress is adequate and upkept. Eye contact is good during conversation.       Diagnostic Test Results:  Labs reviewed in Epic        Assessment & Plan     ICD-10-CM    1. Chronic pain syndrome G89.4 oxyCODONE (ROXICODONE) 5 MG tablet     PAIN MANAGEMENT REFERRAL     DISCONTINUED: oxyCODONE (ROXICODONE) 5 MG tablet   2. Bulge of cervical disc without myelopathy M50.20 oxyCODONE (ROXICODONE) 5 MG tablet     PAIN MANAGEMENT REFERRAL     DISCONTINUED: oxyCODONE (ROXICODONE) 5 MG tablet   3. " Chronic bilateral low back pain with left-sided sciatica M54.42 PAIN MANAGEMENT REFERRAL    G89.29      - - Patient with complicated spinal history including L4-5 disectomy (10/19/11), L4-5 fusion (2/23/17), coccyx fracture and removal (complicated by infection - 10/18/17), and SI Joint fusion (10/26/18)   - Records received from [facility] - Dr. 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      Patient states sore but likes going and will continue       - Patient is on medical marijuana for mood/PTSD issues (following with counseling and psychiatry as well)   - Icing regularly along with stretches from previous physical therapy   - Also taking Gabapentin and Flexeril   -  reviewed   - CSA - 4/24/19        Oxycodone 5 mg, max 5/day, total dose 25 mg per day, MME 37.5      Reviewed risks of narcotic therapy including sedation, addiction, and constipation       Has had history of drug-seeking behavior but not for awhile, as above does have pathology     - Gave 2 - two week rx today at 5/day     Discussed recheck 1 month, at this point expect her to taper to 4 or 3/day      Patient is in agreement     The patient indicates understanding of these issues and agrees with the plan.      Return in about 1 month (around 7/13/2019) for Recheck.    Due to language barrier, an  was present during the history-taking and subsequent discussion (and  for part of the physical exam) with this patient.        Aura Rosario PA-C  [facility]

## 2019-06-08 ENCOUNTER — APPOINTMENT (OUTPATIENT)
Dept: CT IMAGING | Facility: CLINIC | Age: 29
End: 2019-06-08
Attending: NURSE PRACTITIONER
Payer: MEDICARE

## 2019-06-08 ENCOUNTER — HOSPITAL ENCOUNTER (EMERGENCY)
Facility: CLINIC | Age: 29
Discharge: HOME | End: 2019-06-08
Attending: NURSE PRACTITIONER | Admitting: NURSE PRACTITIONER
Payer: MEDICARE

## 2019-06-08 VITALS
OXYGEN SATURATION: 99 % | HEART RATE: 100 BPM | SYSTOLIC BLOOD PRESSURE: 129 MMHG | DIASTOLIC BLOOD PRESSURE: 94 MMHG | TEMPERATURE: 98.9 F | RESPIRATION RATE: 16 BRPM | WEIGHT: 182.56 LBS | BODY MASS INDEX: 33.39 KG/M2

## 2019-06-08 DIAGNOSIS — G89.29 CHRONIC ABDOMINAL PAIN: ICD-10-CM

## 2019-06-08 DIAGNOSIS — R10.9 CHRONIC ABDOMINAL PAIN: ICD-10-CM

## 2019-06-08 DIAGNOSIS — K52.9 COLITIS: ICD-10-CM

## 2019-06-08 LAB
ALBUMIN SERPL-MCNC: 3.7 G/DL (ref 3.4–5)
ALP SERPL-CCNC: 111 U/L (ref 40–150)
ALT SERPL W P-5'-P-CCNC: 32 U/L (ref 0–50)
ANION GAP SERPL CALCULATED.3IONS-SCNC: 6 MMOL/L (ref 3–14)
AST SERPL W P-5'-P-CCNC: 16 U/L (ref 0–45)
BASOPHILS # BLD AUTO: 0 10E9/L (ref 0–0.2)
BASOPHILS NFR BLD AUTO: 0.2 %
BILIRUB SERPL-MCNC: 0.5 MG/DL (ref 0.2–1.3)
BUN SERPL-MCNC: 10 MG/DL (ref 7–30)
CALCIUM SERPL-MCNC: 9.6 MG/DL (ref 8.5–10.1)
CHLORIDE SERPL-SCNC: 110 MMOL/L (ref 94–109)
CO2 SERPL-SCNC: 26 MMOL/L (ref 20–32)
CREAT SERPL-MCNC: 0.71 MG/DL (ref 0.52–1.04)
DIFFERENTIAL METHOD BLD: ABNORMAL
EOSINOPHIL NFR BLD AUTO: 0.2 %
ERYTHROCYTE [DISTWIDTH] IN BLOOD BY AUTOMATED COUNT: 12.4 % (ref 10–15)
GFR SERPL CREATININE-BSD FRML MDRD: >90 ML/MIN/{1.73_M2}
GLUCOSE SERPL-MCNC: 101 MG/DL (ref 70–99)
HCT VFR BLD AUTO: 38.8 % (ref 35–47)
HEMOCCULT STL QL: NEGATIVE
HGB BLD-MCNC: 13.5 G/DL (ref 11.7–15.7)
IMM GRANULOCYTES # BLD: 0 10E9/L (ref 0–0.4)
IMM GRANULOCYTES NFR BLD: 0.2 %
LIPASE SERPL-CCNC: 51 U/L (ref 73–393)
LYMPHOCYTES # BLD AUTO: 0.7 10E9/L (ref 0.8–5.3)
LYMPHOCYTES NFR BLD AUTO: 12.9 %
MCH RBC QN AUTO: 33 PG (ref 26.5–33)
MCHC RBC AUTO-ENTMCNC: 34.8 G/DL (ref 31.5–36.5)
MCV RBC AUTO: 95 FL (ref 78–100)
MONOCYTES # BLD AUTO: 0.4 10E9/L (ref 0–1.3)
MONOCYTES NFR BLD AUTO: 7.4 %
NEUTROPHILS # BLD AUTO: 4.3 10E9/L (ref 1.6–8.3)
NEUTROPHILS NFR BLD AUTO: 79.1 %
NRBC # BLD AUTO: 0 10*3/UL
NRBC BLD AUTO-RTO: 0 /100
PLATELET # BLD AUTO: 278 10E9/L (ref 150–450)
POTASSIUM SERPL-SCNC: 4.3 MMOL/L (ref 3.4–5.3)
PROT SERPL-MCNC: 7.6 G/DL (ref 6.8–8.8)
RBC # BLD AUTO: 4.09 10E12/L (ref 3.8–5.2)
SODIUM SERPL-SCNC: 142 MMOL/L (ref 133–144)
WBC # BLD AUTO: 5.4 10E9/L (ref 4–11)

## 2019-06-08 PROCEDURE — 83690 ASSAY OF LIPASE: CPT | Performed by: NURSE PRACTITIONER

## 2019-06-08 PROCEDURE — 82272 OCCULT BLD FECES 1-3 TESTS: CPT | Performed by: NURSE PRACTITIONER

## 2019-06-08 PROCEDURE — 99284 EMERGENCY DEPT VISIT MOD MDM: CPT | Mod: Z6 | Performed by: NURSE PRACTITIONER

## 2019-06-08 PROCEDURE — 85025 COMPLETE CBC W/AUTO DIFF WBC: CPT | Performed by: NURSE PRACTITIONER

## 2019-06-08 PROCEDURE — 80053 COMPREHEN METABOLIC PANEL: CPT | Performed by: NURSE PRACTITIONER

## 2019-06-08 PROCEDURE — 74176 CT ABD & PELVIS W/O CONTRAST: CPT

## 2019-06-08 PROCEDURE — 96374 THER/PROPH/DIAG INJ IV PUSH: CPT | Performed by: NURSE PRACTITIONER

## 2019-06-08 PROCEDURE — 96375 TX/PRO/DX INJ NEW DRUG ADDON: CPT | Performed by: NURSE PRACTITIONER

## 2019-06-08 PROCEDURE — 96361 HYDRATE IV INFUSION ADD-ON: CPT | Performed by: NURSE PRACTITIONER

## 2019-06-08 PROCEDURE — 25000128 H RX IP 250 OP 636: Performed by: NURSE PRACTITIONER

## 2019-06-08 PROCEDURE — 99285 EMERGENCY DEPT VISIT HI MDM: CPT | Mod: 25 | Performed by: NURSE PRACTITIONER

## 2019-06-08 RX ORDER — ONDANSETRON 2 MG/ML
4 INJECTION INTRAMUSCULAR; INTRAVENOUS ONCE
Status: COMPLETED | OUTPATIENT
Start: 2019-06-08 | End: 2019-06-08

## 2019-06-08 RX ORDER — DIPHENHYDRAMINE HYDROCHLORIDE 50 MG/ML
25 INJECTION INTRAMUSCULAR; INTRAVENOUS ONCE
Status: COMPLETED | OUTPATIENT
Start: 2019-06-08 | End: 2019-06-08

## 2019-06-08 RX ORDER — METHYLPREDNISOLONE SODIUM SUCCINATE 125 MG/2ML
125 INJECTION, POWDER, LYOPHILIZED, FOR SOLUTION INTRAMUSCULAR; INTRAVENOUS ONCE
Status: COMPLETED | OUTPATIENT
Start: 2019-06-08 | End: 2019-06-08

## 2019-06-08 RX ORDER — DEXAMETHASONE SODIUM PHOSPHATE 10 MG/ML
4 INJECTION, SOLUTION INTRAMUSCULAR; INTRAVENOUS ONCE
Status: DISCONTINUED | OUTPATIENT
Start: 2019-06-08 | End: 2019-06-08

## 2019-06-08 RX ADMIN — SODIUM CHLORIDE 1000 ML: 9 INJECTION, SOLUTION INTRAVENOUS at 15:46

## 2019-06-08 RX ADMIN — METHYLPREDNISOLONE SODIUM SUCCINATE 125 MG: 125 INJECTION, POWDER, FOR SOLUTION INTRAMUSCULAR; INTRAVENOUS at 15:53

## 2019-06-08 RX ADMIN — DIPHENHYDRAMINE HYDROCHLORIDE 25 MG: 50 INJECTION INTRAMUSCULAR; INTRAVENOUS at 15:50

## 2019-06-08 RX ADMIN — ONDANSETRON 4 MG: 2 INJECTION INTRAMUSCULAR; INTRAVENOUS at 15:47

## 2019-06-08 ASSESSMENT — ENCOUNTER SYMPTOMS
HEMATOLOGIC/LYMPHATIC NEGATIVE: 1
FEVER: 0
NERVOUS/ANXIOUS: 1
BACK PAIN: 1
HEMATURIA: 0
DYSURIA: 0
NAUSEA: 1
ABDOMINAL PAIN: 1
VOMITING: 0
BLOOD IN STOOL: 1
HEADACHES: 0
FATIGUE: 1
APPETITE CHANGE: 1
COUGH: 0
ARTHRALGIAS: 1

## 2019-06-08 NOTE — ED AVS SNAPSHOT
[facility] Emergency Department  [address] [place] [address]  Phone:  [phone]  Fax:  [phone]                                    Katy Islas   MRN: [id number]    Department:  [facility] Emergency Department   Date of Visit:  6/8/2019           After Visit Summary Signature Page    I have received my discharge instructions, and my questions have been answered. I have discussed any challenges I see with this plan with the nurse or doctor.    ..........................................................................................................................................  Patient/Patient Representative Signature      ..........................................................................................................................................  Patient Representative Print Name and Relationship to Patient    ..................................................               ................................................  Date                                   Time    ..........................................................................................................................................  Reviewed by Signature/Title    ...................................................              ..............................................  Date                                               Time          22EPIC Rev 08/18

## 2019-06-08 NOTE — ED PROVIDER NOTES
History     Chief Complaint   Patient presents with     Abdominal Pain     HPI  Katy Islas is a 29 year old female who has chronic lower back and abdominal pain whom is under pain management agreement confirmed by Dr. Rosario note 5/28/2019 as she has Percocet 5mg tables 5x/day for chronic pain presenting with abdominal pain for 3-4 days and noted 3 times in last 24 hours bright red blood in stools and wondering if her Crohn's is flared up or hemorrhoids.  She is deaf and reads lips very well and refusing .  She has written wwhat she needs for her abdominal pain: Steroids, Compazine, Benadryl, Narcotic pain medication, steroid, and IVF.    I have declined Compazine and Narcotic pain medication as she is on pain management contract and has well documented chronic abdominal pain with drug seeking behaviors.     Denies fever, chills, vomiting. Has had some nausea. Reports feeling bloated. No urinary symptoms.  Reports she has follow up with GI this Monday.     PCP: Aura Rosario     Allergies:  Allergies   Allergen Reactions     Iohexol Hives     Other reaction(s): Hives  IV contrast; patient states she tolerates contrast if she gets benadryl before  IV contrast; patient states she tolerates contrast if she gets benadryl before     Ativan [Lorazepam] Hives     At the IV site     Baclofen      hives     Bees Hives, Swelling and Difficulty breathing     Caffeine      Contrast Dye      Hives,   Updated 5/10/2016 CT Contrast.     Iodine Hives     Methocarbamol Swelling     Metoclopramide      Other reaction(s): Tremors  LW Reaction: shaking/sweating     Midazolam      Other reaction(s): Agitation     Monosodium Glutamate      Morphine Other (See Comments)     Difficulty with urination     Nsaids Other (See Comments)     GI BLEED x2     Other (Do Not Use) Other (See Comments)     Xanaflex- pt becomes disoriented and loses bladder control     Reglan [Metoclopramide Hcl] Other  (See Comments)     shaking     Soma [Carisoprodol] Visual Disturbance     Sleep walking     Tizanidine      Topamax Other (See Comments)     Topamax [Topiramate] Nausea     Tingling  GI/Vomit     Tramadol      Severe Headache, Seizure Risk     Tylenol W/Codeine [Acetaminophen-Codeine] Nausea and Itching     Tylenol 3     Valium Other (See Comments)     Becomes aggressive and angry     Versed Other (See Comments)     Zolmitriptan      Makes face feel like its twitching     Droperidol Anxiety     Flu Virus Vaccine Rash      Arm swelling       Problem List:    Patient Active Problem List    Diagnosis Date Noted     Bulge of cervical disc without myelopathy 04/24/2019     Priority: Medium     Cervicalgia 04/17/2019     Priority: Medium     Class 1 obesity due to excess calories without serious comorbidity with body mass index (BMI) of 32.0 to 32.9 in adult 04/03/2019     Priority: Medium     Hypophosphatemia 11/01/2018     Priority: Medium     Patellar maltracking, right 09/05/2018     Priority: Medium     Right anterior knee pain 09/05/2018     Priority: Medium     Melanocytic nevus, unspecified location 07/23/2018     Priority: Medium     Dysplastic skin lesion 07/23/2018     Priority: Medium     Iliotibial band syndrome affecting lower leg, right 12/21/2017     Priority: Medium     Chondromalacia of right patellofemoral joint 12/21/2017     Priority: Medium     Upper GI bleed - suspected 07/01/2017     Priority: Medium     Tobacco use disorder 07/01/2017     Priority: Medium     History of pseudoseizure 07/01/2017     Priority: Medium     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) 03/10/2017     Priority: Medium     Lumbar disc disease with radiculopathy 02/23/2017     Priority: Medium     S/P lumbar fusion 02/23/2017     Priority: Medium     Dr. YOVANI Millan, 2/23/17       Vitamin D deficiency 01/06/2017     Priority: Medium     Atypical mole 01/06/2017     Priority: Medium     Mild persistent asthma without  complication 12/02/2016     Priority: Medium     Chronic bilateral low back pain with left-sided sciatica 12/02/2016     Priority: Medium     Bee sting allergy 09/16/2016     Priority: Medium     Gastroesophageal reflux disease without esophagitis 08/26/2016     Priority: Medium     Herpes simplex virus infection 11/12/2015     Priority: Medium     Adjustment disorder with mixed anxiety and depressed mood 10/14/2015     Priority: Medium     Marijuana abuse 02/22/2015     Priority: Medium     Bipolar disorder (H) 01/31/2013     Priority: Medium     Problem list name updated by automated process. Provider to review       Chronic pain 02/09/2012     Priority: Medium     Patient is followed by Aura Rosario PA-C for ongoing prescription of pain medication.  All refills should only be approved by this provider, or covering partner.    Medication(s): Norco    Maximum quantity per month: 70  Clinic visit frequency required: Q 2 months     Controlled substance agreement:   Discussed and signed 4/25/17    Encounter-Level CSA - 01/12/2015:                 Controlled Substance Agreement - Scan on 1/26/2015  9:14 AM : Controlled Medication Agreement-01/12/15 (below)            Pain Clinic evaluation in the past: Yes       Date/Location:   [facility], fall 2016    DIRE Total Score(s):    4/25/2017   Total Score 17       Last [facility] website verification:  done on 4/25/17 by LOVE Maki   [email or web address]           Anxiety 09/22/2011     Priority: Medium     Mild major depression (H) 08/29/2011     Priority: Medium     Mild intermittent asthma 10/12/2009     Priority: Medium     Overview:   Formatting of this note might be different from the original.  Action plan: See letter 4/10/2013   ATAQ:   Asthma Data 4/10/2013   Date completed 4/10/2013   Missed daily activities no = 0   Wake at night no = 0   Believe asthma in control yes = 0   ARIN use yes   Maximum ARIN use in 1 day 1-4 = 0   ATAQ  score 0 = well controlled   Asthma ED visits in past 12 mos 0   Asthma hospitalizations in past 12 mos 0     Current Outpatient Prescriptions   Medication Sig     albuterol (PROVENTIL) 0.083 % neb solution Inhale 3 mL via a nebulizer every 4 hours if needed for Shortness Of Breath.        Crohn's colitis (H) 08/04/2009     Priority: Medium     Dysmenorrhea 05/11/2007     Priority: Medium     Benign neoplasm of skin of lower limb, including hip 03/16/2004     Priority: Medium     Migraine      Priority: Medium     Dr. Farrar - Neurology.  Now on Inderal.  Seems to be working.  Follow-up 9/08  Problem list name updated by automated process. Provider to review       Hearing loss      Priority: Medium     Congenital  Problem list name updated by automated process. Provider to review       Allergic rhinitis      Priority: Medium     Problem list name updated by automated process. Provider to review          Past Medical History:    Past Medical History:   Diagnosis Date     Abdominal pain 10/31- 11/4/2005     Allergic rhinitis, cause unspecified      Arthritis      C. 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, without mention of intractable epilepsy      Migraine 07/21/12     Migraine, unspecified, without mention of intractable migraine without mention of status migrainosus      Mild intermittent asthma      Mycoplasma infection in conditions classified elsewhere and of unspecified site      Other chronic pain      Renal disease      Unspecified hearing loss        Past Surgical History:    Past Surgical History:   Procedure Laterality Date     AS REMOVAL OF COCCYX  10/18/2017     C FUSION OF SACROILIAC JOINT  10/26/2018      COLONOSCOPY  7/1/2009    [facility].     COMBINED ESOPHAGOSCOPY, GASTROSCOPY, DUODENOSCOPY (EGD) WITH CO2 INSUFFLATION N/A 4/12/2019    Procedure: COMBINED ESOPHAGOSCOPY, GASTROSCOPY, DUODENOSCOPY (EGD) WITH CO2 INSUFFLATION;  Surgeon: Edwin Conde MD;  Location: [facility] OR     ESOPHAGOSCOPY, GASTROSCOPY, DUODENOSCOPY (EGD), COMBINED N/A 4/12/2019    Procedure: Combined Esophagoscopy, Gastroscopy, Duodenoscopy (Egd), Biopsy Single Or Multiple;  Surgeon: Edwin Conde MD;  Location: [facility] OR     FUSION SPINE POSTERIOR MINIMALLY INVASIVE ONE LEVEL N/A 2/23/2017    Procedure: FUSION SPINE POSTERIOR MINIMALLY INVASIVE ONE LEVEL;  L4-5 Oblique Lateral Lumbar Interbody Fusion   Epidural steroid injection.   Transpedicular Bone marrow aspiration;  Surgeon: Jeniffer Eugene MD;  Location: [facility] OR     HC COLONOSCOPY THRU STOMA WITH BIOPSY  10/29/2003    Impression is that of normal appearing colonoscopy, without evidence of rectal bleeding.     HC COLONOSCOPY THRU STOMA, DIAGNOSTIC  10/00    normal     HC COLONOSCOPY THRU STOMA, DIAGNOSTIC  Oct 2009    Dr López- normal     HC EEG AWAKE AND SLEEP      abnormal     HC MRI BRAIN W/O CONTRAST  12/00    normal     HC REMOVAL GALLBLADDER  8/5/2009    [facility].     HC UGI ENDOSCOPY DIAG W BIOPSY  11/11/09    Normal esophagus     HC UGI ENDOSCOPY, SIMPLE EXAM  7/00, 10/00    mild chronic esophagitis and duodenitis, neg H pylori     HC UGI ENDOSCOPY, SIMPLE EXAM  01/20/2005    Esophagogastroduodenoscopy, colonoscopy with biopsies.  [facility] UGI ENDOSCOPY, SIMPLE EXAM  7/1/2009    [facility].      UGI ENDOSCOPY, SIMPLE EXAM  11/11/2009    attempted upper GI, pt. could not tolerate procedure:[facility]     ORTHOPEDIC SURGERY  October 19,2011    diskectomy L4-L5       Family History:    Family History   Problem Relation Age of Onset     Gastrointestinal Disease  Brother         severe Crohn's     Neurologic Disorder Brother         Seizures post head injury     Depression Brother      Substance Abuse Brother      Genitourinary Problems Father         kidney stones     Diabetes Father      Heart Disease Father         Open heart surgery     Breast Cancer Maternal Grandmother      Parkinsonism Maternal Grandmother      Cerebrovascular Disease Paternal Grandmother      Cancer Maternal Grandfather         Lung     Cardiovascular Paternal Grandfather         Heart Attack     Substance Abuse Mother      Lung Cancer Paternal Uncle      Cancer Paternal Uncle      Lung Cancer Maternal Uncle        Social History:  Marital Status:  Single [1]  Social History     Tobacco Use     Smoking status: Current Every Day Smoker     Packs/day: 0.50     Years: 5.00     Pack years: 2.50     Types: Cigarettes     Smokeless tobacco: Never Used     Tobacco comment: chantix- no smoking for one week    Substance Use Topics     Alcohol use: Not Currently     Comment: Not since last July     Drug use: Yes     Types: Marijuana     Comment: Medical Marijuana currently-- More CDB         Medications:      acetaminophen (TYLENOL) 500 MG tablet   albuterol (2.5 MG/3ML) 0.083% neb solution   albuterol (PROAIR HFA/PROVENTIL HFA/VENTOLIN HFA) 108 (90 Base) MCG/ACT Inhaler   ARIPiprazole (ABILIFY) 15 MG tablet   busPIRone (BUSPAR) 15 MG tablet   clonazePAM (KLONOPIN) 0.5 MG tablet   cyclobenzaprine (FLEXERIL) 10 MG tablet   dicyclomine (BENTYL) 20 MG tablet   diphenhydrAMINE (BENADRYL) 50 MG capsule   DULoxetine (CYMBALTA) 60 MG EC capsule   EPINEPHrine (EPIPEN/ADRENACLICK/OR ANY BX GENERIC EQUIV) 0.3 MG/0.3ML injection 2-pack   gabapentin (NEURONTIN) 300 MG capsule   levocetirizine (XYZAL) 5 MG tablet   meclizine (ANTIVERT) 12.5 MG tablet   medical cannabis (Patient's own supply)   medroxyPROGESTERone (DEPO-PROVERA) 150 MG/ML IM injection   mupirocin (BACTROBAN) 2 % external ointment   NONFORMULARY   nystatin  (MYCOSTATIN) cream   oxyCODONE (ROXICODONE) 5 MG tablet   OXYCODONE-ACETAMINOPHEN PO   pantoprazole (PROTONIX) 40 MG EC tablet   polyethylene glycol (MIRALAX/GLYCOLAX) powder   prazosin (MINIPRESS) 1 MG capsule   prochlorperazine (COMPAZINE) 10 MG tablet   prochlorperazine (COMPAZINE) 25 MG suppository   promethazine (PHENERGAN) 25 MG tablet   rizatriptan (MAXALT-MLT) 5 MG ODT tab   sucralfate (CARAFATE) 1 GM tablet   varenicline (CHANTIX) 1 MG tablet         Review of Systems   Constitutional: Positive for appetite change and fatigue. Negative for fever.   HENT: Negative.    Respiratory: Negative for cough.    Cardiovascular: Negative for chest pain.   Gastrointestinal: Positive for abdominal pain, blood in stool and nausea. Negative for vomiting.   Genitourinary: Negative for dysuria and hematuria.   Musculoskeletal: Positive for arthralgias and back pain.   Skin: Negative.    Allergic/Immunologic: Negative for immunocompromised state.   Neurological: Negative for headaches.   Hematological: Negative.    Psychiatric/Behavioral: The patient is nervous/anxious.        Physical Exam   BP: (!) 129/94  Pulse: 106  Temp: 98.9  F (37.2  C)  Resp: 16  Weight: 82.8 kg (182 lb 9 oz)  SpO2: 100 %      Physical Exam   Constitutional: She appears well-developed and well-nourished. No distress.   Communicates by reading lips   HENT:   Head: Normocephalic and atraumatic.   Mouth/Throat: Oropharynx is clear and moist.   Eyes: Conjunctivae are normal.   Cardiovascular: Normal rate, regular rhythm and normal heart sounds. Exam reveals no friction rub.   No murmur heard.  Apical HR 94 at rest   Pulmonary/Chest: Effort normal and breath sounds normal.   Abdominal: Soft. Bowel sounds are normal. She exhibits no distension. There is tenderness.       Genitourinary: Rectum normal. Pelvic exam was performed with patient in the knee-chest position.   Musculoskeletal: She exhibits no edema.   Neurological: She is alert.   Skin: Skin is  warm and dry. Capillary refill takes less than 2 seconds. She is not diaphoretic. No pallor.   Nursing note and vitals reviewed.      ED Course  1520: Patient called me back into room. She reports she vomited her Percocet and zofran up prior to arrival. She is requesting Dilaudid IV for pain. She also reports she cannot take Decadron but can take Solumedrol.  Discussed with patient I will not give her Dilaudid until I have an acute cause for her pain as she has chronic abdominal pain with multiple ER visits concerning this within last month.  She is amenable to plan of zofran, IVF, benadryl, solu-medrol and labs and Ct of abdomen.     1709: Discussed with patient Ct and labs. Discussed mild colitis. She is to continue with her antinausea and home pain regimen. She had negative hemoccult today. She requested Valium at discharge and I reiterated to her she is on a pain contract with her PCP in such she will also need to get any barbiturate type medications from her PCP. She will call Monday.         Procedures               Critical Care time:  none               Results for orders placed or performed during the hospital encounter of 06/08/19 (from the past 24 hour(s))   Occult blood stool   Result Value Ref Range    Occult Blood Negative NEG^Negative   CBC with platelets differential   Result Value Ref Range    WBC 5.4 4.0 - 11.0 10e9/L    RBC Count 4.09 3.8 - 5.2 10e12/L    Hemoglobin 13.5 11.7 - 15.7 g/dL    Hematocrit 38.8 35.0 - 47.0 %    MCV 95 78 - 100 fl    MCH 33.0 26.5 - 33.0 pg    MCHC 34.8 31.5 - 36.5 g/dL    RDW 12.4 10.0 - 15.0 %    Platelet Count 278 150 - 450 10e9/L    Diff Method Automated Method     % Neutrophils 79.1 %    % Lymphocytes 12.9 %    % Monocytes 7.4 %    % Eosinophils 0.2 %    % Basophils 0.2 %    % Immature Granulocytes 0.2 %    Nucleated RBCs 0 0 /100    Absolute Neutrophil 4.3 1.6 - 8.3 10e9/L    Absolute Lymphocytes 0.7 (L) 0.8 - 5.3 10e9/L    Absolute Monocytes 0.4 0.0 - 1.3 10e9/L     Absolute Basophils 0.0 0.0 - 0.2 10e9/L    Abs Immature Granulocytes 0.0 0 - 0.4 10e9/L    Absolute Nucleated RBC 0.0    Comprehensive metabolic panel   Result Value Ref Range    Sodium 142 133 - 144 mmol/L    Potassium 4.3 3.4 - 5.3 mmol/L    Chloride 110 (H) 94 - 109 mmol/L    Carbon Dioxide 26 20 - 32 mmol/L    Anion Gap 6 3 - 14 mmol/L    Glucose 101 (H) 70 - 99 mg/dL    Urea Nitrogen 10 7 - 30 mg/dL    Creatinine 0.71 0.52 - 1.04 mg/dL    GFR Estimate >90 >60 mL/min/[1.73_m2]    GFR Estimate If Black >90 >60 mL/min/[1.73_m2]    Calcium 9.6 8.5 - 10.1 mg/dL    Bilirubin Total 0.5 0.2 - 1.3 mg/dL    Albumin 3.7 3.4 - 5.0 g/dL    Protein Total 7.6 6.8 - 8.8 g/dL    Alkaline Phosphatase 111 40 - 150 U/L    ALT 32 0 - 50 U/L    AST 16 0 - 45 U/L   Lipase   Result Value Ref Range    Lipase 51 (L) 73 - 393 U/L   CT Abdomen Pelvis w/o Contrast    Narrative    CT ABDOMEN PELVIS W/O CONTRAST 6/8/2019 4:04 PM    HISTORY: Abdominal pain.    TECHNIQUE: CT imaging of the abdomen and pelvis is performed without  IV contrast.  Abdominal organs, pelvic organs, bowel, aorta,  retroperitoneum, and abdominal wall are also assessed to the limits of  no IV contrast.  Radiation dose for this scan was reduced using  automated exposure control, adjustment of the mA and/or kV according  to patient size, or iterative reconstruction technique.    COMPARISON: May 7, 2019.    FINDINGS:    Liver: Normal.  Gallbladder: Surgically absent.  Pancreas:Normal.  Spleen:Normal.  Adrenals:Normal.  Ascites:None.    Kidneys/ureters: There are tiny punctate bilateral nonobstructing  renal stones, as before. No hydronephrosis.   Bladder:Normal.  Pelvic free fluid:None.    Bowels: Mild bowel wall thickening of the transverse and descending  colon which could reflect mild colitis. No evidence of obstruction.  Appendix:Normal.    Abdominal or pelvic lymphadenopathy:None.    Miscellaneous findings:None.      Impression    IMPRESSION:    1. Mild bowel wall  thickening of the transverse and descending colon  which could reflect mild colitis.  2. Status post cholecystectomy.  3. Tiny punctate bilateral nonobstructing renal stones.    LB JOE MD     *Note: Due to a large number of results and/or encounters for the requested time period, some results have not been displayed. A complete set of results can be found in Results Review.       Medications   0.9% sodium chloride BOLUS (0 mLs Intravenous Stopped 6/8/19 1657)   diphenhydrAMINE (BENADRYL) injection 25 mg (25 mg Intravenous Given 6/8/19 1550)   ondansetron (ZOFRAN) injection 4 mg (4 mg Intravenous Given 6/8/19 1547)   methylPREDNISolone sodium succinate (solu-MEDROL) injection 125 mg (125 mg Intravenous Given 6/8/19 1553)            I have reviewed the nursing notes.    I have reviewed the findings, diagnosis, plan and need for follow up with the patient.          Medication List      There are no discharge medications for this visit.         Final diagnoses:   Colitis   Chronic abdominal pain       6/8/2019   [facility] EMERGENCY DEPARTMENT     Nina Wilson, NATA CNP  06/08/19 4059

## 2019-06-08 NOTE — DISCHARGE INSTRUCTIONS
Labs are stable. CT showed mild colitis.  No blood today on rectal examination    Call Aura Rosario regarding further pain control

## 2019-06-10 ENCOUNTER — TRANSFERRED RECORDS (OUTPATIENT)
Dept: HEALTH INFORMATION MANAGEMENT | Facility: CLINIC | Age: 29
End: 2019-06-10

## 2019-06-10 ENCOUNTER — TELEPHONE (OUTPATIENT)
Dept: FAMILY MEDICINE | Facility: OTHER | Age: 29
End: 2019-06-10

## 2019-06-10 ENCOUNTER — MYC MEDICAL ADVICE (OUTPATIENT)
Dept: FAMILY MEDICINE | Facility: OTHER | Age: 29
End: 2019-06-10

## 2019-06-10 ENCOUNTER — PATIENT OUTREACH (OUTPATIENT)
Dept: CARE COORDINATION | Facility: CLINIC | Age: 29
End: 2019-06-10

## 2019-06-10 NOTE — TELEPHONE ENCOUNTER
Please call patient as her message states she has an appointment today. She has 2 scheduled Thursday 6/13/19 at 10:45 & NEXT Monday 6/17/19 at 2:45.     I sent her a MyChart but best to call her so she doesn't drive here today.     Zach Rosario PA-C  [facility]

## 2019-06-10 NOTE — TELEPHONE ENCOUNTER
Tc please help pt get scheduled with Junaid Pritchett at GI. Please send number to [employer] per [employer] recommendations.   Jenni Luo MA

## 2019-06-10 NOTE — PROGRESS NOTES
Clinic Care Coordination Contact    Situation: Patient chart reviewed by care coordinator.    Background: CHRISTIANNE CC had spoke with pt and given some financial resources for help with bills and other disability resources. Attempted to reach pt to see if had further questions, did not reach via phone.     Assessment: CHRISTIANNE CC sent pt [employer] message on 6/5/19. Pt has read that message. No reply or response at this time. CHRISTIANNE PALENCIA notes pt seen in ED again over the weekend due to abdominal pain. Pt is on pain contract, was requesting specific medications from ED. Pt referred to f/u with PCP. Pt has GI appointment tomorrow and 2 appts coming up with PCP in the next week. Pt has history of drug seeking behaviors.     Plan/Recommendations: Pt has some specialist and f/u appointments with PCP scheduled. CHRISTIANNE PALENCIA will continue to monitor and see if pt responds to [employer] message with any further questions or CHRISTIANNE PALENCIA needs.     DOYLE Torres   Primary Care Clinic- Social Work Care Coordinator  [facility]  6/10/2019 12:34 PM  [phone]

## 2019-06-10 NOTE — TELEPHONE ENCOUNTER
Reason for call:  Form  Reason for Call:  Form, our goal is to have forms completed with 72 hours, however, some forms may require a visit or additional information.    Type of letter, form or note:  medical    Who is the form from?: [facility]    Where did the form come from: form was faxed in    What clinic location was the form placed at?: [facility] - [phone]    Where the form was placed: Given to physician    What number is listed as a contact on the form?: [phone]       Additional comments: sign,date and fax back to [phone]    Call taken on 6/10/2019 at 4:13 PM by Antonella Jennings

## 2019-06-11 ENCOUNTER — OFFICE VISIT (OUTPATIENT)
Dept: GASTROENTEROLOGY | Facility: CLINIC | Age: 29
End: 2019-06-11
Payer: MEDICARE

## 2019-06-11 ENCOUNTER — MYC MEDICAL ADVICE (OUTPATIENT)
Dept: FAMILY MEDICINE | Facility: OTHER | Age: 29
End: 2019-06-11

## 2019-06-11 VITALS
HEIGHT: 62 IN | BODY MASS INDEX: 33.31 KG/M2 | OXYGEN SATURATION: 97 % | HEART RATE: 103 BPM | SYSTOLIC BLOOD PRESSURE: 114 MMHG | DIASTOLIC BLOOD PRESSURE: 84 MMHG | WEIGHT: 181 LBS

## 2019-06-11 DIAGNOSIS — Z87.19 HX OF CROHN'S DISEASE: ICD-10-CM

## 2019-06-11 DIAGNOSIS — R11.2 NAUSEA AND VOMITING, INTRACTABILITY OF VOMITING NOT SPECIFIED, UNSPECIFIED VOMITING TYPE: ICD-10-CM

## 2019-06-11 DIAGNOSIS — K52.9 COLITIS: ICD-10-CM

## 2019-06-11 DIAGNOSIS — R19.7 DIARRHEA, UNSPECIFIED TYPE: Primary | ICD-10-CM

## 2019-06-11 DIAGNOSIS — R10.9 ABDOMINAL CRAMPING: ICD-10-CM

## 2019-06-11 PROCEDURE — 99214 OFFICE O/P EST MOD 30 MIN: CPT | Performed by: PHYSICIAN ASSISTANT

## 2019-06-11 RX ORDER — DICYCLOMINE HCL 20 MG
20 TABLET ORAL 4 TIMES DAILY PRN
Qty: 60 TABLET | Refills: 0 | Status: SHIPPED | OUTPATIENT
Start: 2019-06-11 | End: 2019-08-19

## 2019-06-11 ASSESSMENT — ENCOUNTER SYMPTOMS
DYSURIA: 0
ADENOPATHY: 0
WEAKNESS: 0
PHOTOPHOBIA: 0
ROS GI COMMENTS: SEE HPI
HEADACHES: 0
SHORTNESS OF BREATH: 0
BLOOD IN STOOL: 0
RECTAL PAIN: 0
VOMITING: 1
FLANK PAIN: 0
NERVOUS/ANXIOUS: 1
NAUSEA: 1
LIGHT-HEADEDNESS: 0
CHEST TIGHTNESS: 0
HEMATURIA: 0
ABDOMINAL PAIN: 1
FEVER: 0
UNEXPECTED WEIGHT CHANGE: 0
DIFFICULTY URINATING: 0
SORE THROAT: 0
COUGH: 0

## 2019-06-11 ASSESSMENT — MIFFLIN-ST. JEOR: SCORE: 1494.26

## 2019-06-11 ASSESSMENT — PAIN SCALES - GENERAL: PAINLEVEL: MODERATE PAIN (5)

## 2019-06-11 NOTE — TELEPHONE ENCOUNTER
Pending Prescriptions:                       Disp   Refills    dicyclomine (BENTYL) 20 MG tablet         60 tab*0            Sig: Take 1 tablet (20 mg) by mouth 4 times daily as           needed (ABDOMINAL CRAMPING) AS NEEDED FOR           CRAMPING    Prescription approved per [facility] Refill Protocol.    Replied to patient via mychart with this update.  Marianna Layne, RN, BSN

## 2019-06-11 NOTE — PATIENT INSTRUCTIONS
Submit stool samples when completed.   Please call [phone] to schedule a barium esophagram xray.   Please call [phone] to schedule a colonoscopy.   Continue with your esophageal manometry as scheduled.

## 2019-06-11 NOTE — PROGRESS NOTES
GASTROENTEROLOGY FOLLOW UP CLINIC VISIT    CC/REFERRING MD:    Aura Rosario      REASON FOR CONSULTATION:   Referred by Aura Rosario for Follow up.       HISTORY OF PRESENT ILLNESS:    Katy Islas is 29 year old female who presents for follow up.    HPI obtained through [facility] interpretor services.     She was initially seen on 4/8/19 for evaluation of hematemesis.  Please see initial note for more detail. We further evaluated with an upper endoscopy on 4/12/19 which did not reveal any signs or stigmata of bleeding.  There is a probable hiatal hernia otherwise exam was unremarkable. Biopsies were negative for H. pylori.  Her blood counts have also been noted to be stable. No signs of anemia. She continues to have significant reflux despite Protonix twice daily and Carafate several times a day.  She also continues to have nausea and vomiting which she now describes as bile. She does have hx of cholecystectomy.     She also notes bright red blood on stool and when wiping, she has had symptoms off and on for over a year.  She recently had a colonoscopy in August 2018 which did show signs of hemorrhoids and revealed decreased vascularity throughout the colon however biopsies did not show any diagnostic abnormality and were negative for microscopic, active and chronic colitis. There was no suggestion of IBD noted. (she did have brbpr at the time of colonoscopy).     She returns today for worsening symptoms.  She notes worsening abdominal pain as well as several days of bright red blood on stool and when wiping.  She recently went to the emergency room last week for these worsening symptoms.  She was evaluated with a CT scan of the abdomen pelvis which revealed mild bowel wall thickening of the transverse and descending colon which could reflect mild colitis.  Her Hemoccult stool test during her ER visit was negative.     PREVIOUS ENDOSCOPY:    Upper Endoscopy  4/12/19  Impression:       - Esophagogastric landmarks identified.                             - Gastroesophageal flap valve classified as Hill Grade III (minimal fold, loose to endoscope, hiatal hernia likely).                             - Normal stomach.                             - Normal duodenal bulb, first portion of the duodenum and second portion of the duodenum.                             - Biopsies were taken with a cold forceps for Helicobacter pylori testing.                             - No findings to account for complaint of coffee ground emesis and hematemesis.       SPECIMEN(S):   Antral biopsy     FINAL DIAGNOSIS:   ANTRUM, BIOPSY:   - Gastric mucosa with no significant inflammation   - No H. pylori like organisms identified on routine staining   - Negative for intestinal metaplasia or dysplasia     I have personally reviewed all specimens and/or slides, including the listed special stains, and used them with my medical judgement to determine or confirm the final diagnosis.     Electronically signed out by:     Adrian Wagner M.D., [facility]       PROBLEM LIST  Patient Active Problem List    Diagnosis Date Noted     Bulge of cervical disc without myelopathy 04/24/2019     Priority: Medium     Cervicalgia 04/17/2019     Priority: Medium     Class 1 obesity due to excess calories without serious comorbidity with body mass index (BMI) of 32.0 to 32.9 in adult 04/03/2019     Priority: Medium     Hypophosphatemia 11/01/2018     Priority: Medium     Patellar maltracking, right 09/05/2018     Priority: Medium     Right anterior knee pain 09/05/2018     Priority: Medium     Melanocytic nevus, unspecified location 07/23/2018     Priority: Medium     Dysplastic skin lesion 07/23/2018     Priority: Medium     Iliotibial band syndrome affecting lower leg, right 12/21/2017     Priority: Medium     Chondromalacia of right patellofemoral joint 12/21/2017     Priority: Medium     Upper GI bleed - suspected  07/01/2017     Priority: Medium     Tobacco use disorder 07/01/2017     Priority: Medium     History of pseudoseizure 07/01/2017     Priority: Medium     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) 03/10/2017     Priority: Medium     Lumbar disc disease with radiculopathy 02/23/2017     Priority: Medium     S/P lumbar fusion 02/23/2017     Priority: Medium     Dr. YOVANI Millan, 2/23/17       Vitamin D deficiency 01/06/2017     Priority: Medium     Atypical mole 01/06/2017     Priority: Medium     Mild persistent asthma without complication 12/02/2016     Priority: Medium     Chronic bilateral low back pain with left-sided sciatica 12/02/2016     Priority: Medium     Bee sting allergy 09/16/2016     Priority: Medium     Gastroesophageal reflux disease without esophagitis 08/26/2016     Priority: Medium     Herpes simplex virus infection 11/12/2015     Priority: Medium     Adjustment disorder with mixed anxiety and depressed mood 10/14/2015     Priority: Medium     Marijuana abuse 02/22/2015     Priority: Medium     Bipolar disorder (H) 01/31/2013     Priority: Medium     Problem list name updated by automated process. Provider to review       Chronic pain 02/09/2012     Priority: Medium     Patient is followed by Aura Rosario PA-C for ongoing prescription of pain medication.  All refills should only be approved by this provider, or covering partner.    Medication(s): Norco    Maximum quantity per month: 70  Clinic visit frequency required: Q 2 months     Controlled substance agreement:   Discussed and signed 4/25/17    Encounter-Level CSA - 01/12/2015:                 Controlled Substance Agreement - Scan on 1/26/2015  9:14 AM : Controlled Medication Agreement-01/12/15 (below)            Pain Clinic evaluation in the past: Yes       Date/Location:   [facility], fall 2016    DIRE Total Score(s):    4/25/2017   Total Score 17       Last [facility] website verification:  done on 4/25/17 by  LOVE Maki   [email or web address]           Anxiety 09/22/2011     Priority: Medium     Mild major depression (H) 08/29/2011     Priority: Medium     Mild intermittent asthma 10/12/2009     Priority: Medium     Overview:   Formatting of this note might be different from the original.  Action plan: See letter 4/10/2013   ATAQ:   Asthma Data 4/10/2013   Date completed 4/10/2013   Missed daily activities no = 0   Wake at night no = 0   Believe asthma in control yes = 0   ARIN use yes   Maximum ARIN use in 1 day 1-4 = 0   ATAQ score 0 = well controlled   Asthma ED visits in past 12 mos 0   Asthma hospitalizations in past 12 mos 0     Current Outpatient Prescriptions   Medication Sig     albuterol (PROVENTIL) 0.083 % neb solution Inhale 3 mL via a nebulizer every 4 hours if needed for Shortness Of Breath.        Crohn's colitis (H) 08/04/2009     Priority: Medium     Dysmenorrhea 05/11/2007     Priority: Medium     Benign neoplasm of skin of lower limb, including hip 03/16/2004     Priority: Medium     Migraine      Priority: Medium     Dr. Farrar - Neurology.  Now on Inderal.  Seems to be working.  Follow-up 9/08  Problem list name updated by automated process. Provider to review       Hearing loss      Priority: Medium     Congenital  Problem list name updated by automated process. Provider to review       Allergic rhinitis      Priority: Medium     Problem list name updated by automated process. Provider to review         PERTINENT PAST MEDICAL HISTORY:  (I personally reviewed this history with the patient at today's visit)   Past Medical History:   Diagnosis Date     Abdominal pain 10/31- 11/4/2005    [facility] admit for Crohn's     Allergic rhinitis, cause unspecified     Allergic rhinitis     Arthritis      C. difficile diarrhea     Past, no current diarrhea.     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, without mention of intractable epilepsy     pseudoseizures diagnosed after extensive neurologic eval     Migraine 07/21/12    D/C 07/22/12-[facility]     Migraine, unspecified, without mention of intractable migraine without mention of status migrainosus     Migraine     Mild intermittent asthma     mild intermittent     Mycoplasma infection in conditions classified elsewhere and of unspecified site      Other chronic pain     Back pain for 6 years     Renal disease     Kidney stones     Unspecified hearing loss     congenital hearing loss         PREVIOUS SURGERIES: (I personally reviewed this history with the patient at today's visit)   Past Surgical History:   Procedure Laterality Date     AS REMOVAL OF COCCYX  10/18/2017     C FUSION OF SACROILIAC JOINT  10/26/2018     COLONOSCOPY  7/1/2009    [facility].     COMBINED ESOPHAGOSCOPY, GASTROSCOPY, DUODENOSCOPY (EGD) WITH CO2 INSUFFLATION N/A 4/12/2019    Procedure: COMBINED ESOPHAGOSCOPY, GASTROSCOPY, DUODENOSCOPY (EGD) WITH CO2 INSUFFLATION;  Surgeon: Edwin Conde MD;  Location: [facility] OR     ESOPHAGOSCOPY, GASTROSCOPY, DUODENOSCOPY (EGD), COMBINED N/A 4/12/2019    Procedure: Combined Esophagoscopy, Gastroscopy, Duodenoscopy (Egd), Biopsy Single Or Multiple;  Surgeon: Edwin Conde MD;  Location: [facility] OR     FUSION SPINE POSTERIOR MINIMALLY INVASIVE ONE LEVEL N/A 2/23/2017    Procedure: FUSION SPINE POSTERIOR MINIMALLY INVASIVE ONE LEVEL;  L4-5 Oblique Lateral Lumbar Interbody Fusion   Epidural steroid injection.   Transpedicular Bone marrow aspiration;  Surgeon:  Jeniffer Eugene MD;  Location: [facility] OR     HC COLONOSCOPY THRU STOMA WITH BIOPSY  10/29/2003    Impression is that of normal appearing colonoscopy, without evidence of rectal bleeding.     HC COLONOSCOPY THRU STOMA, DIAGNOSTIC  10/00    normal     HC COLONOSCOPY THRU STOMA, DIAGNOSTIC  Oct 2009    Dr López- normal     HC EEG AWAKE AND SLEEP      abnormal     HC MRI BRAIN W/O CONTRAST  12/00    normal     HC REMOVAL GALLBLADDER  8/5/2009    [facility], [place].     HC UGI ENDOSCOPY DIAG W BIOPSY  11/11/09    Normal esophagus     HC UGI ENDOSCOPY, SIMPLE EXAM  7/00, 10/00    mild chronic esophagitis and duodenitis, neg H pylori     HC UGI ENDOSCOPY, SIMPLE EXAM  01/20/2005    Esophagogastroduodenoscopy, colonoscopy with biopsies.  [facility]     HC UGI ENDOSCOPY, SIMPLE EXAM  7/1/2009    [facility].      UGI ENDOSCOPY, SIMPLE EXAM  11/11/2009    attempted upper GI, pt. could not tolerate procedure:[facility]     ORTHOPEDIC SURGERY  October 19,2011    diskectomy L4-L5         ALLERGIES:     Allergies   Allergen Reactions     Iohexol Hives     Other reaction(s): Hives  IV contrast; patient states she tolerates contrast if she gets benadryl before  IV contrast; patient states she tolerates contrast if she gets benadryl before     Ativan [Lorazepam] Hives     At the IV site     Baclofen      hives     Bees Hives, Swelling and Difficulty breathing     Caffeine      Contrast Dye      Hives,   Updated 5/10/2016 CT Contrast.     Iodine Hives     Methocarbamol Swelling     Metoclopramide      Other reaction(s): Tremors  LW Reaction: shaking/sweating     Midazolam      Other reaction(s): Agitation     Monosodium Glutamate      Morphine Other (See Comments)     Difficulty with urination     Nsaids Other (See Comments)     GI BLEED x2     Other (Do Not Use) Other (See Comments)     Xanaflex- pt becomes disoriented and loses bladder control     Reglan [Metoclopramide  Hcl] Other (See Comments)     shaking     Soma [Carisoprodol] Visual Disturbance     Sleep walking     Tizanidine      Topamax Other (See Comments)     Topamax [Topiramate] Nausea     Tingling  GI/Vomit     Tramadol      Severe Headache, Seizure Risk     Tylenol W/Codeine [Acetaminophen-Codeine] Nausea and Itching     Tylenol 3     Valium Other (See Comments)     Becomes aggressive and angry     Versed Other (See Comments)     Zolmitriptan      Makes face feel like its twitching     Droperidol Anxiety     Flu Virus Vaccine Rash      Arm swelling       PERTINENT MEDICATIONS:    Current Outpatient Medications:      acetaminophen (TYLENOL) 500 MG tablet, Take 2 tablets (1,000 mg) by mouth 3 times daily as needed for mild pain, Disp: 100 tablet, Rfl: 0     albuterol (2.5 MG/3ML) 0.083% neb solution, Take 1 vial (2.5 mg) by nebulization every 6 hours as needed for shortness of breath / dyspnea or wheezing, Disp: 50 vial, Rfl: 11     albuterol (PROAIR HFA/PROVENTIL HFA/VENTOLIN HFA) 108 (90 Base) MCG/ACT Inhaler, Inhale 2 puffs into the lungs every 6 hours as needed for shortness of breath / dyspnea or wheezing, Disp: 3 Inhaler, Rfl: 3     ARIPiprazole (ABILIFY) 15 MG tablet, Take 1 tablet (15 mg) by mouth At Bedtime, Disp: 90 tablet, Rfl: 3     busPIRone (BUSPAR) 15 MG tablet, TK 1 T PO BID, Disp: , Rfl: 5     clonazePAM (KLONOPIN) 0.5 MG tablet, TK 1/2 TO 1 T D PRA, Disp: , Rfl: 5     cyclobenzaprine (FLEXERIL) 10 MG tablet, Take 1 tablet (10 mg) by mouth 2 times daily as needed for muscle spasms or other (back pain), Disp: 60 tablet, Rfl: 0     dicyclomine (BENTYL) 20 MG tablet, Take 1 tablet (20 mg) by mouth 4 times daily as needed (ABDOMINAL CRAMPING) AS NEEDED FOR CRAMPING, Disp: 60 tablet, Rfl: 0     diphenhydrAMINE (BENADRYL) 50 MG capsule, Take 1-2 capsules ( mg) by mouth every 6 hours as needed for itching, allergies or sleep, Disp: 90 capsule, Rfl: 3     DULoxetine (CYMBALTA) 60 MG EC capsule, Take 1  capsule (60 mg) by mouth daily, Disp: 90 capsule, Rfl: 3     gabapentin (NEURONTIN) 300 MG capsule, Take 1-3 capsules (300-900 mg) by mouth 3 times daily, Disp: 270 capsule, Rfl: 5     levocetirizine (XYZAL) 5 MG tablet, Take 1 tablet (5 mg) by mouth every evening, Disp: 90 tablet, Rfl: 3     meclizine (ANTIVERT) 12.5 MG tablet, Take 1 tablet (12.5 mg) by mouth 4 times daily as needed for dizziness, Disp: 30 tablet, Rfl: 1     medical cannabis (Patient's own supply), (The purpose of this order is to document that the patient reports taking medical cannabis.  This is not a prescription, and is not used to certify that the patient has a qualifying medical condition.), Disp: 0 Information only, Rfl: 0     medroxyPROGESTERone (DEPO-PROVERA) 150 MG/ML IM injection, Inject 1 mL (150 mg) into the muscle every 3 months, Disp: 3 mL, Rfl: 3     mupirocin (BACTROBAN) 2 % external ointment, Apply topically 3 times daily, Disp: , Rfl:      NONFORMULARY, Apply 30 mLs topically 4 times daily, Disp: , Rfl:      nystatin (MYCOSTATIN) cream, nystatin 100,000 unit/gram topical cream, Disp: , Rfl:      oxyCODONE (ROXICODONE) 5 MG tablet, Take 1 tablet (5 mg) by mouth every 8 hours as needed for severe pain (5/day) (two week supply), Disp: 70 tablet, Rfl: 0     OXYCODONE-ACETAMINOPHEN PO, 5 mg, Disp: , Rfl:      pantoprazole (PROTONIX) 40 MG EC tablet, Take 1 tablet (40 mg) by mouth 2 times daily Take 30-60 minutes before a meal., Disp: 180 tablet, Rfl: 3     polyethylene glycol (MIRALAX/GLYCOLAX) powder, Take 17 g (1 capful) by mouth daily, Disp: 238 g, Rfl: 3     prazosin (MINIPRESS) 1 MG capsule, Take 1 mg by mouth At Bedtime, Disp: , Rfl: 5     prochlorperazine (COMPAZINE) 10 MG tablet, Take 1 tablet (10 mg) by mouth every 6 hours as needed for nausea or vomiting, Disp: 60 tablet, Rfl: 3     prochlorperazine (COMPAZINE) 25 MG suppository, Place 1 suppository (25 mg) rectally every 12 hours as needed for nausea, Disp: 12 suppository,  Rfl: 3     promethazine (PHENERGAN) 25 MG tablet, Take 1 tablet (25 mg) by mouth every 6 hours as needed for nausea, Disp: 30 tablet, Rfl: 0     rizatriptan (MAXALT-MLT) 5 MG ODT tab, Take 1-2 tablets (5-10 mg) by mouth at onset of headache for migraine May repeat in 2 hours. Max 6 tablets/24 hours., Disp: 18 tablet, Rfl: 3     sucralfate (CARAFATE) 1 GM tablet, Take 1 tablet (1 g) by mouth 4 times daily as needed (heartburn), Disp: 360 tablet, Rfl: 3     varenicline (CHANTIX) 1 MG tablet, Take 1 tablet (1 mg) by mouth 2 times daily, Disp: 60 tablet, Rfl: 3     EPINEPHrine (EPIPEN/ADRENACLICK/OR ANY BX GENERIC EQUIV) 0.3 MG/0.3ML injection 2-pack, Inject 0.3 mLs (0.3 mg) into the muscle once as needed for anaphylaxis (Patient not taking: Reported on 5/13/2019), Disp: 0.6 mL, Rfl: 3    Current Facility-Administered Medications:      medroxyPROGESTERone (DEPO-PROVERA) injection 150 mg, 150 mg, Intramuscular, Q90 Days, Aura Rosario PA-C, 150 mg at 05/16/19 1006    SOCIAL HISTORY:  Social History     Socioeconomic History     Marital status: Single     Spouse name: Not on file     Number of children: Not on file     Years of education: Not on file     Highest education level: Not on file   Occupational History     Not on file   Social Needs     Financial resource strain: Not on file     Food insecurity:     Worry: Not on file     Inability: Not on file     Transportation needs:     Medical: Not on file     Non-medical: Not on file   Tobacco Use     Smoking status: Current Every Day Smoker     Packs/day: 0.50     Years: 5.00     Pack years: 2.50     Types: Cigarettes     Smokeless tobacco: Never Used     Tobacco comment: chantix- no smoking for one week    Substance and Sexual Activity     Alcohol use: Not Currently     Comment: Not since last July     Drug use: Yes     Types: Marijuana     Comment: Medical Marijuana currently-- More CDB      Sexual activity: Not Currently     Partners: Male     Birth  control/protection: Condom, Injection   Lifestyle     Physical activity:     Days per week: Not on file     Minutes per session: Not on file     Stress: Not on file   Relationships     Social connections:     Talks on phone: Not on file     Gets together: Not on file     Attends Christian service: Not on file     Active member of club or organization: Not on file     Attends meetings of clubs or organizations: Not on file     Relationship status: Not on file     Intimate partner violence:     Fear of current or ex partner: Not on file     Emotionally abused: Not on file     Physically abused: Not on file     Forced sexual activity: Not on file   Other Topics Concern      Service No     Blood Transfusions No     Caffeine Concern No     Occupational Exposure No     Hobby Hazards No     Sleep Concern No     Stress Concern No     Weight Concern No     Special Diet No     Back Care No     Exercise Yes     Bike Helmet No     Seat Belt Yes     Self-Exams Yes     Parent/sibling w/ CABG, MI or angioplasty before 65F 55M? Yes     Comment: father late 30's early 40's    Social History Narrative     Not on file       FAMILY HISTORY: (I personally reviewed this history with the patient at today's visit)  Family History   Problem Relation Age of Onset     Gastrointestinal Disease Brother         severe Crohn's     Neurologic Disorder Brother         Seizures post head injury     Depression Brother      Substance Abuse Brother      Genitourinary Problems Father         kidney stones     Diabetes Father      Heart Disease Father         Open heart surgery     Breast Cancer Maternal Grandmother      Parkinsonism Maternal Grandmother      Cerebrovascular Disease Paternal Grandmother      Cancer Maternal Grandfather         Lung     Cardiovascular Paternal Grandfather         Heart Attack     Substance Abuse Mother      Lung Cancer Paternal Uncle      Cancer Paternal Uncle      Lung Cancer Maternal Uncle           Review of  "Systems   Constitutional: Negative for fever and unexpected weight change.   HENT: Negative for congestion and sore throat.    Eyes: Negative for photophobia and visual disturbance.   Respiratory: Negative for cough, chest tightness and shortness of breath.    Cardiovascular: Negative for chest pain and leg swelling.   Gastrointestinal: Positive for abdominal pain, nausea and vomiting. Negative for blood in stool and rectal pain.        See HPI   Endocrine: Negative for cold intolerance and heat intolerance.   Genitourinary: Negative for difficulty urinating, dysuria, flank pain and hematuria.   Skin: Negative for pallor and rash.   Allergic/Immunologic: Negative for immunocompromised state.   Neurological: Negative for weakness, light-headedness and headaches.   Hematological: Negative for adenopathy.   Psychiatric/Behavioral: The patient is nervous/anxious.        PHYSICAL EXAMINATION:  Constitutional: aaox3, cooperative, pleasant, not dyspneic/diaphoretic, no acute distress  Vitals reviewed: /84 (BP Location: Left arm, Patient Position: Sitting, Cuff Size: Adult Regular)   Pulse 103   Ht 1.575 m (5' 2\")   Wt 82.1 kg (181 lb)   SpO2 97%   BMI 33.11 kg/m     Wt:   Wt Readings from Last 2 Encounters:   06/11/19 82.1 kg (181 lb)   06/08/19 82.8 kg (182 lb 9 oz)        Physical Exam   Constitutional: She is oriented to person, place, and time. She appears well-developed and well-nourished.   HENT:   Head: Normocephalic and atraumatic.   Nose: Nose normal.   Mouth/Throat: No oropharyngeal exudate.   Eyes: Pupils are equal, round, and reactive to light. Conjunctivae and EOM are normal. Right eye exhibits no discharge. Left eye exhibits no discharge. No scleral icterus.   Neck: Normal range of motion.   Cardiovascular: Normal rate and regular rhythm.   Pulmonary/Chest: Effort normal.   Abdominal: Soft. Bowel sounds are normal. She exhibits no distension and no mass. There is no tenderness. There is no rebound " and no guarding.   Musculoskeletal: Normal range of motion.   Neurological: She is alert and oriented to person, place, and time.   Skin: Skin is warm and dry. She is not diaphoretic. No erythema. No pallor.   Psychiatric: Her mood appears anxious.   Nursing note and vitals reviewed.          PERTINENT STUDIES: (I personally reviewed these laboratory studies today)  Most recent CBC:  Recent Labs   Lab Test 06/08/19  1538 05/07/19  1451   WBC 5.4 9.4   HGB 13.5 12.9   HCT 38.8 38.2    263     Most recent hepatic panel:  Recent Labs   Lab Test 06/08/19  1538 05/25/19   ALT 32 15   AST 16 13     Most recent creatinine:  Recent Labs   Lab Test 06/08/19 1538 05/25/19   CR 0.71 0.82       TSH   Date Value Ref Range Status   02/20/2018 0.92 0.40 - 4.00 mU/L Final         RADIOLOGY:     CT ABDOMEN PELVIS W/O CONTRAST 6/8/2019 4:04 PM     HISTORY: Abdominal pain.     TECHNIQUE: CT imaging of the abdomen and pelvis is performed without  IV contrast.  Abdominal organs, pelvic organs, bowel, aorta,  retroperitoneum, and abdominal wall are also assessed to the limits of  no IV contrast.  Radiation dose for this scan was reduced using  automated exposure control, adjustment of the mA and/or kV according  to patient size, or iterative reconstruction technique.     COMPARISON: May 7, 2019.     FINDINGS:     Liver: Normal.  Gallbladder: Surgically absent.  Pancreas:Normal.  Spleen:Normal.  Adrenals:Normal.  Ascites:None.     Kidneys/ureters: There are tiny punctate bilateral nonobstructing  renal stones, as before. No hydronephrosis.   Bladder:Normal.  Pelvic free fluid:None.     Bowels: Mild bowel wall thickening of the transverse and descending  colon which could reflect mild colitis. No evidence of obstruction.  Appendix:Normal.     Abdominal or pelvic lymphadenopathy:None.     Miscellaneous findings:None.                                                                      IMPRESSION:     1. Mild bowel wall thickening of  the transverse and descending colon  which could reflect mild colitis.  2. Status post cholecystectomy.  3. Tiny punctate bilateral nonobstructing renal stones.     LB JOE MD    ABDOMEN TWO-THREE VIEW  12/10/2018 9:02 PM       HISTORY: Mid abdominal pain.      COMPARISON: October 23, 2017     FINDINGS: Small amount of stool. No free air. There are no air filled  distended loops of small bowel. The colon is not distended. The lung  bases are unremarkable.                                                                      IMPRESSION: Nonobstructed bowel gas pattern.     MACHO WADE MD    CT ABDOMEN PELVIS WITHOUT CONTRAST   12/9/2018 8:16 PM      HISTORY: Abdominal pain.     TECHNIQUE: Volumetric helical sections were acquired from the lung  bases through the ischial tuberosities without IV contrast. Coronal  images were also reconstructed. Radiation dose for this scan was  reduced using automated exposure control, adjustment of the mA and/or  kV according to patient size, or iterative reconstruction technique.     COMPARISON: CT of the abdomen and pelvis without IV contrast performed  11/20/2018.     FINDINGS: No bowel obstruction. No convincing evidence for colitis or  diverticulitis. There is a trace amount of nonspecific free fluid in  the pelvis. Unremarkable appendix. Prior cholecystectomy. There are a  few nonobstructing stones in both kidneys, measuring 0.3 cm or  smaller, unchanged. No ureteral calculi or hydronephrosis. The liver,  spleen, adrenal glands, pancreas, and kidneys have otherwise  unremarkable noncontrast appearances. The visualized lung bases are  clear. Postoperative changes of posterior marilyn and pedicle screw fusion  are noted at L4-5. There are also surgical screws crossing the left  sacroiliac joint.                                                                      IMPRESSION:   1. No acute abnormality in the abdomen or pelvis. No definite cause  for abdominal pain is  identified.  2. Scattered small nonobstructing stones in both kidneys are  unchanged.     MARCELINA HALL MD      CT ABDOMEN AND PELVIS WITHOUT CONTRAST   11/20/2018 6:10 PM      HISTORY: Left lower quadrant pain and bloody stools.     TECHNIQUE: No IV contrast material. Radiation dose for this scan was  reduced using automated exposure control, adjustment of the mA and/or  kV according to patient size, or iterative reconstruction technique.     COMPARISON: None.     FINDINGS:    Abdomen: Cholecystectomy. Normal appearance of the liver, spleen,  pancreas and adrenal glands. There are multiple tiny calculi or  pre-calculi in the renal collecting systems or medullary pyramids with  no evidence of hydronephrosis. No ureteral dilatation. No free air,  free fluid or adenopathy. Normal-appearing stomach, small bowel, colon  and appendix. No hernia. No colonic diverticulosis or diverticulitis.     Pelvis: Postoperative changes in the lower lumbar spine from fusion  surgery. The bladder, uterus and ovaries appear normal.                                                                      IMPRESSION:  1. No specific etiology seen for the left lower quadrant pain and  bloody stools.  2. Multiple tiny calculi or pre-calculi in the renal collecting system  or medullary pyramids. No hydronephrosis.     IMAN ALCANTARA MD        ASSESSMENT/PLAN:    Katy Islas is a 29 year old female who presents for follow up.     She was initially seen on 4/8/2019 for further evaluation of hematemesis.  We further evaluated with an upper endoscopy which did not reveal any signs of bleeding and besides a probable hiatal hernia was unremarkable.  Due to her continued reflux she was recommended to have esophageal manometry and pH impedance testing as well as barium esophagram.  None of these tests have been done at this time.  She does have motility studies scheduled.  She is continuing to take Protonix twice daily and Carafate.  She does  continue to have nausea and vomiting on occasion.  We reviewed that the pain medication and that marijuana usage can cause nausea vomiting symptoms.    She she notes worsening abdominal pain and rectal bleeding at this time.  She does have a history of bright red blood per rectum when wiping including the stools. She did have a recent colonoscopy in August 2018 (she was having symptoms at that time as well) which showed hemorrhoids and decreased vascularity in the colon, without signs of active IBD. We attempted to treat her hemorrhoids to see if this would relieve her symptoms.  She has however noted to have mild colitis on her CT scan during her recent ER visit.  We will check stool studies today to rule out potential infectious etiology.  Patient elects to proceed with colonoscopy at this time as well due to her history of IBD.     Also recommend nutrition referral to help determine any food triggers or any necessary nutritional intervention while we proceed with work up.       Diarrhea, unspecified type  Colitis  Nausea and vomiting, intractability of vomiting not specified, unspecified vomiting type  Abdominal cramping    Orders Placed This Encounter   Procedures     Calprotectin Feces     Standing Status:   Future     Standing Expiration Date:   6/10/2020     Clostridium difficile toxin B PCR     Standing Status:   Future     Standing Expiration Date:   7/11/2019     Enteric Bacteria and Virus Panel by BENJI Stool     Standing Status:   Future     Standing Expiration Date:   6/10/2020     Ova and Parasite Exam Routine     Standing Status:   Future     Standing Expiration Date:   6/10/2020     Giardia antigen     Standing Status:   Future     Standing Expiration Date:   6/10/2020           Follow up after procedures.     Thank you for this consultation.  It was a pleasure to participate in the care of this patient; please contact us with any further questions.  A total of 25 minutes, face to face, was spent with  this patient, >50% of which was counseling regarding the above delineated issues.            This note was created with voice recognition software, and while reviewed for accuracy, typos may remain.     Junaid Pritchett PA-C  Gastroenterology  [facility]

## 2019-06-11 NOTE — NURSING NOTE
Katy Islas's goals for this visit include:   Chief Complaint   Patient presents with     RECHECK     Abdominal pain and blood in stool; Patient reports no blood in stool for the past 2 days, but is having diarrhea; Pain with bowel movements       She requests these members of her care team be copied on today's visit information: PCP    PCP: Aura Rosario    Referring Provider:  No referring provider defined for this encounter.    /84 (BP Location: Left arm, Patient Position: Sitting, Cuff Size: Adult Regular)   Pulse 103   Wt 82.1 kg (181 lb)   SpO2 97%   BMI 33.11 kg/m      Do you need any medication refills at today's visit? No    Cathy Benitez LPN

## 2019-06-12 NOTE — TELEPHONE ENCOUNTER
Eli Greenwood is a 34 year old woman        who presents today for annual exam. Last CPE 2016. Dr. Polanco.    GYN exam 2014.    LMP none. 2018 PATRICK with [facility]. Satisfied with the surgery.Dr Marcello Mark, OB/GYN.    . 10-year-old daughter, 8-year-old son, 5-year-old daughter.    Of note,  7 days post op hysterectomy-- anaphylactic reaction to the cephalosporin used as a preoperative antibiotic. Cefazolin. Hands, feet, body paresthesias.  Treated in urgent care. Prednisone. GI cocktail. Dermatology. After steroid injection needed to go to the emergency room for epinephrine injection through the night.    Requesting rectal cream refill due to hemorrhoid flare-ups.    Works part-time Respiratory therapist at [employer].  Exercise-no formal program.  Started a 7 day colon cleanse. Plans to start exercising.    MEDICATIONS:   Current Outpatient Medications   Medication Sig Dispense Refill   • EPINEPHrine (EPIPEN 2-ARON) 0.3 MG/0.3ML auto-injector Inject 0.3 mLs into the muscle 1 time as needed for Anaphylaxis. 2 each 0   • IBUPROFEN PO      • Daily Multiple Vitamins TABS daily.  30 tablet    • hydroCORTisone (PROCTOCARE-HC) 2.5 % rectal cream Use twice daily as needed 30 g 3     No current facility-administered medications for this visit.      ALLERGIES:   ALLERGIES:   Allergen Reactions   • Cefazolin ANAPHYLAXIS     PAST MEDICAL HISTORY:   Past Medical History:   Diagnosis Date   • Irregular periods    • Well woman exam with routine gynecological exam      PAST SURGICAL HISTORY:   Past Surgical History:   Procedure Laterality Date   •  section, low transverse  10/29/2010    x2   •  section, low transverse     • Cholecystectomy  2005   • Endometrial biopsy  2018   • Hb tubal ligation with   10/07/13    Repeat  with Tubal Ligation   • Insert intrauterine device     • Laparoscopy,fulgur of oviducts  2005    uncertain if this  LPN sent patient a [employer] message on 5/8/19 notifying her that Anusol supp. PA was denied and that provider would like patient to try preparation H supp OTC. Patient has not read the [employer] message at this time. LPN called and left patient a voicemail notifying her of information. Closing encounter at this time.     Cathy Benitez LPN     occured.   • Laparoscopy,lysis of adhesions  08/14/2018    Lysis of Adhesions    • Robotic assisted hysterectomy  08/14/2018    Robotic Assisted Total Vaginal Hysterectomy   • Salpingectomy Bilateral 08/14/2018    Bilateral Salpingectomy    • Throat surgery procedure unlisted     • Tonsillectomy and adenoidectomy     • [place] tooth extraction        SOCIAL HISTORY:   Social History     Socioeconomic History   • Marital status: /Civil Union     Spouse name: Kat   • Number of children: 3   • Years of education: 14.5   • Highest education level: Not on file   Occupational History   • Occupation: resp. tech     Employer: [employer]     Employer: [employer]   Social Needs   • Financial resource strain: Not on file   • Food insecurity:     Worry: Not on file     Inability: Not on file   • Transportation needs:     Medical: Not on file     Non-medical: Not on file   Tobacco Use   • Smoking status: Never Smoker   • Smokeless tobacco: Never Used   Substance and Sexual Activity   • Alcohol use: Yes     Alcohol/week: 0.0 - 1.2 oz     Comment: 3-4 / WK   • Drug use: No   • Sexual activity: Yes     Partners: Male     Birth control/protection: Surgical     Comment: tubal   Lifestyle   • Physical activity:     Days per week: Not on file     Minutes per session: Not on file   • Stress: Not on file   Relationships   • Social connections:     Talks on phone: Not on file     Gets together: Not on file     Attends [employer]ism service: Not on file     Active member of club or organization: Not on file     Attends meetings of clubs or organizations: Not on file     Relationship status: Not on file   • Intimate partner violence:     Fear of current or ex partner: Not on file     Emotionally abused: Not on file     Physically abused: Not on file     Forced sexual activity: Not on file   Other Topics Concern   •  Service No   • Blood Transfusions No   • Caffeine Concern No   • Occupational Exposure Yes     Comment:  respiratory therapist   • Hobby Hazards No   • Sleep Concern No   • Stress Concern No   • Weight Concern No   • Special Diet No   • Back Care No   • Exercise Yes   • Bike Helmet Yes   • Seat Belt Yes   • Self-Exams Yes   Social History Narrative   • Not on file       FAMILY HISTORY:   Family History   Problem Relation Age of Onset   • Hypertension Mother    • Other Father         hepatitis   • Diabetes Maternal Grandfather    • Cancer, Lung Maternal Grandfather 76        non small cell metastes to brain   • Cancer Paternal Grandmother         breast and ovarion  p   • Hypertension Paternal Grandfather    • Parkinsonism Paternal Grandfather    • Cancer Paternal Aunt         breast and ovarian  p       All past medical, surgical, social and family history reviewed and updated.    Health Maintenance Summary     Depression Screening (Yearly)  Next due on 6/12/2020    Cervical Cancer Screening HPV CO-Testing (Every 5 Years)  Next due on 1/13/2022    DTaP/Tdap/Td Vaccine (2 - Td)  Next due on 8/16/2023    Influenza Vaccine   Completed    Pneumococcal Vaccine 0-64   Aged Out            REVIEW OF SYSTEMS:  CONSTITUTIONAL: Denies fever/sweats and unintentional weight change.  CV: Denies chest discomfort with exertion, SOB or palpitations.   RESPIRATORY: Denies cough, SOB, change in exercise tolerance.   GI: Denies abdominal pain, nausea, change in bowel habits, melena.   : Denies frequency, dysuria.  MSK: Denies joint pain, muscle aches.   SKIN: Denies concerns, changing moles.   NEURO: Denies changes.  PSYCH: Denies changes.    Last Labs include:  No results found for: CHOLESTEROL    OBJECTIVE:  Blood pressure 110/62, height 5' 5\" (1.651 m), weight 79.8 kg, last menstrual period 07/12/2018.  GEN: NAD, Ax3, mood appropriate  SKIN: Pink, warm, and dry without rashes or erythema  NECK: Supple, No thyromegaly, carotids without bruits. No Lymph nodes palpable  LUNGS: Clear to auscultation, no wheezing, rhonchi, or crackles  auscultated.  HEART: Regular rate and rhythm, without murmurs or extra sounds.  BREASTS: NO erythema, dimpling, or retractions. No dominant masses palpated or nipple discharge bilaterally. No axillary lymph nodes palpated. BSE reviewed and encouraged.  ABDOMEN: Flat,Soft and nontender without masses or hepatosplenomegaly.No hernias present, no abdominal bruits auscultated  EXTREMITIES: Nontender. No edema. Joints without deformities.  LYMPH: No palpable neck or groin lymph nodes  BLADDER:nontender  PELVIC EXAM:   VULVA: LABIA MAJORA: normal color and texture   LABIA MINORA: mucosa pink, moist and no lesions  VESTIBULAR GLANDS: non-inflamed, nontender  CLITORIS: retracts normally and no edema  VAGINA: mucosa pink and moist and no lesions. Vaginal cuff intact.  CERVIX: surgically absent  UTERUS: surgically absent  ADNEXA: surgically absent  RECTOVAGINAL: not performed    IMPRESSION:  Normal GYN exam status post hysterectomy with bilateral salpingo oophorectomy  1. Well woman exam with routine gynecological exam    2. Other hemorrhoids        EDUCATION:  Discussed the following issues with the patient: Self Breast Exam  Exercise   Diet and weight loss  Vitamin D and Calcium supplements    PLAN:  Pap smear: no longer needed due to hysterectomy  Medications:  Analpram HC  Mammo: not needed  Labs: none  STD screening: not performed per patient request   Return to clinic: 1 year for CPE.

## 2019-06-13 ENCOUNTER — OFFICE VISIT (OUTPATIENT)
Dept: FAMILY MEDICINE | Facility: OTHER | Age: 29
End: 2019-06-13
Payer: MEDICARE

## 2019-06-13 VITALS
HEIGHT: 62 IN | TEMPERATURE: 98.1 F | HEART RATE: 98 BPM | SYSTOLIC BLOOD PRESSURE: 122 MMHG | BODY MASS INDEX: 32.94 KG/M2 | DIASTOLIC BLOOD PRESSURE: 82 MMHG | RESPIRATION RATE: 14 BRPM | OXYGEN SATURATION: 98 % | WEIGHT: 179 LBS

## 2019-06-13 DIAGNOSIS — M50.30 BULGE OF CERVICAL DISC WITHOUT MYELOPATHY: ICD-10-CM

## 2019-06-13 DIAGNOSIS — M54.42 CHRONIC BILATERAL LOW BACK PAIN WITH LEFT-SIDED SCIATICA: ICD-10-CM

## 2019-06-13 DIAGNOSIS — G89.4 CHRONIC PAIN SYNDROME: Primary | ICD-10-CM

## 2019-06-13 DIAGNOSIS — G89.29 CHRONIC BILATERAL LOW BACK PAIN WITH LEFT-SIDED SCIATICA: ICD-10-CM

## 2019-06-13 PROCEDURE — 99214 OFFICE O/P EST MOD 30 MIN: CPT | Performed by: PHYSICIAN ASSISTANT

## 2019-06-13 RX ORDER — OXYCODONE HYDROCHLORIDE 5 MG/1
5 TABLET ORAL EVERY 8 HOURS PRN
Qty: 70 TABLET | Refills: 0 | Status: SHIPPED | OUTPATIENT
Start: 2019-06-14 | End: 2019-06-13

## 2019-06-13 RX ORDER — OXYCODONE HYDROCHLORIDE 5 MG/1
5 TABLET ORAL EVERY 8 HOURS PRN
Qty: 70 TABLET | Refills: 0 | Status: SHIPPED | OUTPATIENT
Start: 2019-06-28 | End: 2019-07-08

## 2019-06-13 ASSESSMENT — ANXIETY QUESTIONNAIRES
1. FEELING NERVOUS, ANXIOUS, OR ON EDGE: NEARLY EVERY DAY
GAD7 TOTAL SCORE: 10
3. WORRYING TOO MUCH ABOUT DIFFERENT THINGS: SEVERAL DAYS
7. FEELING AFRAID AS IF SOMETHING AWFUL MIGHT HAPPEN: NOT AT ALL
GAD7 TOTAL SCORE: INCOMPLETE
5. BEING SO RESTLESS THAT IT IS HARD TO SIT STILL: SEVERAL DAYS
6. BECOMING EASILY ANNOYED OR IRRITABLE: SEVERAL DAYS
2. NOT BEING ABLE TO STOP OR CONTROL WORRYING: SEVERAL DAYS

## 2019-06-13 ASSESSMENT — PATIENT HEALTH QUESTIONNAIRE - PHQ9
SUM OF ALL RESPONSES TO PHQ QUESTIONS 1-9: 7
5. POOR APPETITE OR OVEREATING: NEARLY EVERY DAY
10. IF YOU CHECKED OFF ANY PROBLEMS, HOW DIFFICULT HAVE THESE PROBLEMS MADE IT FOR YOU TO DO YOUR WORK, TAKE CARE OF THINGS AT HOME, OR GET ALONG WITH OTHER PEOPLE: SOMEWHAT DIFFICULT
SUM OF ALL RESPONSES TO PHQ QUESTIONS 1-9: 7

## 2019-06-13 ASSESSMENT — PAIN SCALES - GENERAL: PAINLEVEL: MODERATE PAIN (5)

## 2019-06-13 ASSESSMENT — MIFFLIN-ST. JEOR: SCORE: 1485.19

## 2019-06-14 ENCOUNTER — NURSE TRIAGE (OUTPATIENT)
Dept: FAMILY MEDICINE | Facility: OTHER | Age: 29
End: 2019-06-14

## 2019-06-14 ASSESSMENT — ANXIETY QUESTIONNAIRES: GAD7 TOTAL SCORE: 10

## 2019-06-14 ASSESSMENT — PATIENT HEALTH QUESTIONNAIRE - PHQ9: SUM OF ALL RESPONSES TO PHQ QUESTIONS 1-9: 7

## 2019-06-14 NOTE — TELEPHONE ENCOUNTER
Reason for Call:  Same Day Appointment, Requested Provider:  any     PCP: Aura Rosario    Reason for visit: follicle cyst on neck    Duration of symptoms: today    Have you been treated for this in the past? Yes    Additional comments: please call. Is wondering if she could be worked in today in [place] with anyone. Declined another location    Can we leave a detailed message on this number? YES    Phone number patient can be reached at: Home number on file [phone] (home)    Best Time: any    Call taken on 6/14/2019 at 1:30 PM by Kenzie Maria

## 2019-06-17 ENCOUNTER — TRANSFERRED RECORDS (OUTPATIENT)
Dept: HEALTH INFORMATION MANAGEMENT | Facility: CLINIC | Age: 29
End: 2019-06-17

## 2019-06-17 NOTE — TELEPHONE ENCOUNTER
Next 5 appointments (look out 90 days)    Jul 11, 2019  9:00 AM CDT  [facility] Visit with [facility]-C  [facility] [address] [place] [id number]  [phone]        Ines Roy, RN, BSN

## 2019-06-19 ENCOUNTER — HOSPITAL ENCOUNTER (OUTPATIENT)
Facility: CLINIC | Age: 29
Discharge: HOME | End: 2019-06-20
Attending: INTERNAL MEDICINE | Admitting: INTERNAL MEDICINE
Payer: MEDICARE

## 2019-06-19 PROCEDURE — 40000879 ZZH CANCELLED SURGERY UP TO 16-30 MINS: Performed by: INTERNAL MEDICINE

## 2019-06-19 NOTE — OR NURSING
Pt scheduled for esophageal motility study today in endoscopy.  Pt arrived to procedure room with  present.  Pt asked if a tube was going in her nose and was told that there was a catheter to be placed through the nose down her esophagus and into her stomach.  Pt stated she would not do the test and would like to be asleep for it.  Explained the necessity to be awake to swallow fluids in order to evaluate the function of the esophagus.  Pt refused the procedure.  Did not begin pre-op assessment prior to pt leaving endoscopy.

## 2019-06-20 ENCOUNTER — HOSPITAL ENCOUNTER (EMERGENCY)
Facility: CLINIC | Age: 29
Discharge: HOME | End: 2019-06-20
Attending: EMERGENCY MEDICINE | Admitting: EMERGENCY MEDICINE
Payer: MEDICARE

## 2019-06-20 ENCOUNTER — TELEPHONE (OUTPATIENT)
Dept: GASTROENTEROLOGY | Facility: CLINIC | Age: 29
End: 2019-06-20

## 2019-06-20 ENCOUNTER — NURSE TRIAGE (OUTPATIENT)
Dept: FAMILY MEDICINE | Facility: OTHER | Age: 29
End: 2019-06-20

## 2019-06-20 VITALS
BODY MASS INDEX: 32.92 KG/M2 | RESPIRATION RATE: 17 BRPM | DIASTOLIC BLOOD PRESSURE: 89 MMHG | HEART RATE: 87 BPM | OXYGEN SATURATION: 99 % | TEMPERATURE: 97.2 F | SYSTOLIC BLOOD PRESSURE: 130 MMHG | WEIGHT: 180 LBS

## 2019-06-20 DIAGNOSIS — K52.9 COLITIS: ICD-10-CM

## 2019-06-20 PROCEDURE — 25000128 H RX IP 250 OP 636: Performed by: EMERGENCY MEDICINE

## 2019-06-20 PROCEDURE — 99285 EMERGENCY DEPT VISIT HI MDM: CPT | Performed by: EMERGENCY MEDICINE

## 2019-06-20 PROCEDURE — 96372 THER/PROPH/DIAG INJ SC/IM: CPT | Performed by: EMERGENCY MEDICINE

## 2019-06-20 PROCEDURE — 99285 EMERGENCY DEPT VISIT HI MDM: CPT | Mod: Z6 | Performed by: EMERGENCY MEDICINE

## 2019-06-20 RX ORDER — METHYLPREDNISOLONE 4 MG
TABLET, DOSE PACK ORAL
Qty: 21 TABLET | Refills: 0 | Status: SHIPPED | OUTPATIENT
Start: 2019-06-20 | End: 2019-06-25

## 2019-06-20 RX ADMIN — HYDROMORPHONE HYDROCHLORIDE 1 MG: 1 INJECTION, SOLUTION INTRAMUSCULAR; INTRAVENOUS; SUBCUTANEOUS at 13:48

## 2019-06-20 ASSESSMENT — ENCOUNTER SYMPTOMS
ABDOMINAL PAIN: 1
CONFUSION: 0
NECK STIFFNESS: 0
DIFFICULTY URINATING: 0
FEVER: 0
BLOOD IN STOOL: 1
COLOR CHANGE: 0
HEADACHES: 0
EYE REDNESS: 0
SHORTNESS OF BREATH: 0
DIARRHEA: 1
ARTHRALGIAS: 0

## 2019-06-20 NOTE — ED PROVIDER NOTES
History     Chief Complaint   Patient presents with     Abdominal Pain     HPI     Patient is deaf.  States she is an excellent lip reader and refused     Katy Islas is a 29 year old female significant past medical history for hearing loss, Crohn's colitis, generalized anxiety disorder, major depression, chronic pain, tobacco use disorder, chronic pain contract was sent in by her GI specialist for evaluation of increased abdominal pain, bloating, loose stools with occasional blood-tinged mucus adherent to the stool.  She presents with concerns that she is having a flare of her inflammatory bowel disease.  Her last visit to the ED on June 8 for similar concern included normal CBC, CMP.  CRP was normal.  CT did confirm some mild telemetry change in the transverse and descending colon consistent with colitis.  Her stool at that time was negative for Hemoccult despite verbalization that she is seen blood.  Her pelvic examination was unremarkable.  She is treated with Solu-Medrol 1 g IV, Benadryl, Zofran and Compazine.  Discharged home on no tapering of steroids.  States she started to improve but within about a week symptoms started to return.  The patient does have oxycodone available at home.  States she took 1 tablet today without any improvement in her discomfort.  She has had frequent ED visits in the past for pain management.  She reports no nausea or vomiting.  She has had no fever chills or night sweats.  States that her abdominal discomfort is mild cramping at times gets occasional sharp pain.  Rates the sharp pain 10/10 intensity.    Allergies:  Allergies   Allergen Reactions     Iohexol Hives     Other reaction(s): Hives  IV contrast; patient states she tolerates contrast if she gets benadryl before  IV contrast; patient states she tolerates contrast if she gets benadryl before     Ativan [Lorazepam] Hives     At the IV site     Baclofen      hives     Bees Hives, Swelling and Difficulty  breathing     Caffeine      Contrast Dye      Hives,   Updated 5/10/2016 CT Contrast.     Iodine Hives     Methocarbamol Swelling     Metoclopramide      Other reaction(s): Tremors  LW Reaction: shaking/sweating     Midazolam      Other reaction(s): Agitation     Monosodium Glutamate      Morphine Other (See Comments)     Difficulty with urination     Nsaids Other (See Comments)     GI BLEED x2     Other (Do Not Use) Other (See Comments)     Xanaflex- pt becomes disoriented and loses bladder control     Reglan [Metoclopramide Hcl] Other (See Comments)     shaking     Soma [Carisoprodol] Visual Disturbance     Sleep walking     Tizanidine      Topamax Other (See Comments)     Topamax [Topiramate] Nausea     Tingling  GI/Vomit     Tramadol      Severe Headache, Seizure Risk     Tylenol W/Codeine [Acetaminophen-Codeine] Nausea and Itching     Tylenol 3     Valium Other (See Comments)     Becomes aggressive and angry     Versed Other (See Comments)     Zolmitriptan      Makes face feel like its twitching     Droperidol Anxiety     Flu Virus Vaccine Rash      Arm swelling       Problem List:    Patient Active Problem List    Diagnosis Date Noted     Bulge of cervical disc without myelopathy 04/24/2019     Priority: Medium     Cervicalgia 04/17/2019     Priority: Medium     Class 1 obesity due to excess calories without serious comorbidity with body mass index (BMI) of 32.0 to 32.9 in adult 04/03/2019     Priority: Medium     Hypophosphatemia 11/01/2018     Priority: Medium     Patellar maltracking, right 09/05/2018     Priority: Medium     Right anterior knee pain 09/05/2018     Priority: Medium     Melanocytic nevus, unspecified location 07/23/2018     Priority: Medium     Dysplastic skin lesion 07/23/2018     Priority: Medium     Iliotibial band syndrome affecting lower leg, right 12/21/2017     Priority: Medium     Chondromalacia of right patellofemoral joint 12/21/2017     Priority: Medium     Upper GI bleed -  suspected 07/01/2017     Priority: Medium     Tobacco use disorder 07/01/2017     Priority: Medium     History of pseudoseizure 07/01/2017     Priority: Medium     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) 03/10/2017     Priority: Medium     Lumbar disc disease with radiculopathy 02/23/2017     Priority: Medium     S/P lumbar fusion 02/23/2017     Priority: Medium     Dr. YOVANI Millan, 2/23/17       Vitamin D deficiency 01/06/2017     Priority: Medium     Atypical mole 01/06/2017     Priority: Medium     Mild persistent asthma without complication 12/02/2016     Priority: Medium     Chronic bilateral low back pain with left-sided sciatica 12/02/2016     Priority: Medium     Bee sting allergy 09/16/2016     Priority: Medium     Gastroesophageal reflux disease without esophagitis 08/26/2016     Priority: Medium     Herpes simplex virus infection 11/12/2015     Priority: Medium     Adjustment disorder with mixed anxiety and depressed mood 10/14/2015     Priority: Medium     Marijuana abuse 02/22/2015     Priority: Medium     Bipolar disorder (H) 01/31/2013     Priority: Medium     Problem list name updated by automated process. Provider to review       Chronic pain 02/09/2012     Priority: Medium     Patient is followed by Aura Rosario PA-C for ongoing prescription of pain medication.  All refills should only be approved by this provider, or covering partner.    Medication(s): Norco    Maximum quantity per month: 70  Clinic visit frequency required: Q 2 months     Controlled substance agreement:   Discussed and signed 4/25/17    Encounter-Level CSA - 01/12/2015:                 Controlled Substance Agreement - Scan on 1/26/2015  9:14 AM : Controlled Medication Agreement-01/12/15 (below)            Pain Clinic evaluation in the past: Yes       Date/Location:   [facility], fall 2016    DIRE Total Score(s):    4/25/2017   Total Score 17       Last [facility] website verification:  done on 4/25/17 by  LOVE Maki   [email or web address]           Anxiety 09/22/2011     Priority: Medium     Mild major depression (H) 08/29/2011     Priority: Medium     Mild intermittent asthma 10/12/2009     Priority: Medium     Overview:   Formatting of this note might be different from the original.  Action plan: See letter 4/10/2013   ATAQ:   Asthma Data 4/10/2013   Date completed 4/10/2013   Missed daily activities no = 0   Wake at night no = 0   Believe asthma in control yes = 0   ARIN use yes   Maximum ARIN use in 1 day 1-4 = 0   ATAQ score 0 = well controlled   Asthma ED visits in past 12 mos 0   Asthma hospitalizations in past 12 mos 0     Current Outpatient Prescriptions   Medication Sig     albuterol (PROVENTIL) 0.083 % neb solution Inhale 3 mL via a nebulizer every 4 hours if needed for Shortness Of Breath.        Crohn's colitis (H) 08/04/2009     Priority: Medium     Dysmenorrhea 05/11/2007     Priority: Medium     Benign neoplasm of skin of lower limb, including hip 03/16/2004     Priority: Medium     Migraine      Priority: Medium     Dr. Farrar - Neurology.  Now on Inderal.  Seems to be working.  Follow-up 9/08  Problem list name updated by automated process. Provider to review       Hearing loss      Priority: Medium     Congenital  Problem list name updated by automated process. Provider to review       Allergic rhinitis      Priority: Medium     Problem list name updated by automated process. Provider to review          Past Medical History:    Past Medical History:   Diagnosis Date     Abdominal pain 10/31- 11/4/2005     Allergic rhinitis, cause unspecified      Arthritis      C. difficile diarrhea      Crohn's disease (H)      Crohn's disease (H)      Depression with anxiety 2003     Esophageal reflux      Grand mal seizure disorder (H) 10/8/2013     Intestinal infection due to Clostridium difficile 10/00     Localization-related (focal) (partial) epilepsy and epileptic syndromes with  simple partial seizures, without mention of intractable epilepsy      Migraine 07/21/12     Migraine, unspecified, without mention of intractable migraine without mention of status migrainosus      Mild intermittent asthma      Mycoplasma infection in conditions classified elsewhere and of unspecified site      Other chronic pain      Renal disease      Unspecified hearing loss        Past Surgical History:    Past Surgical History:   Procedure Laterality Date     AS REMOVAL OF COCCYX  10/18/2017     C FUSION OF SACROILIAC JOINT  10/26/2018     COLONOSCOPY  7/1/2009    [facility].     COMBINED ESOPHAGOSCOPY, GASTROSCOPY, DUODENOSCOPY (EGD) WITH CO2 INSUFFLATION N/A 4/12/2019    Procedure: COMBINED ESOPHAGOSCOPY, GASTROSCOPY, DUODENOSCOPY (EGD) WITH CO2 INSUFFLATION;  Surgeon: Edwin Conde MD;  Location: [facility] OR     ESOPHAGOSCOPY, GASTROSCOPY, DUODENOSCOPY (EGD), COMBINED N/A 4/12/2019    Procedure: Combined Esophagoscopy, Gastroscopy, Duodenoscopy (Egd), Biopsy Single Or Multiple;  Surgeon: Edwin Conde MD;  Location: [facility] OR     FUSION SPINE POSTERIOR MINIMALLY INVASIVE ONE LEVEL N/A 2/23/2017    Procedure: FUSION SPINE POSTERIOR MINIMALLY INVASIVE ONE LEVEL;  L4-5 Oblique Lateral Lumbar Interbody Fusion   Epidural steroid injection.   Transpedicular Bone marrow aspiration;  Surgeon: Jeniffer Eugene MD;  Location: [facility] OR     HC COLONOSCOPY THRU STOMA WITH BIOPSY  10/29/2003    Impression is that of normal appearing colonoscopy, without evidence of rectal bleeding.     HC COLONOSCOPY THRU STOMA, DIAGNOSTIC  10/00    normal     HC COLONOSCOPY THRU STOMA, DIAGNOSTIC  Oct 2009    Dr López- normal     HC EEG AWAKE AND SLEEP      abnormal     HC MRI BRAIN W/O CONTRAST  12/00    normal     HC REMOVAL GALLBLADDER  8/5/2009    [facility].     HC UGI ENDOSCOPY DIAG W BIOPSY  11/11/09    Normal esophagus     HC UGI ENDOSCOPY, SIMPLE EXAM  7/00, 10/00    mild  chronic esophagitis and duodenitis, neg H pylori      UGI ENDOSCOPY, SIMPLE EXAM  01/20/2005    Esophagogastroduodenoscopy, colonoscopy with biopsies.  [facility] UGI ENDOSCOPY, SIMPLE EXAM  7/1/2009    [facility] [place].      UGI ENDOSCOPY, SIMPLE EXAM  11/11/2009    attempted upper GI, pt. could not tolerate procedure:[facility]     ORTHOPEDIC SURGERY  October 19,2011    diskectomy L4-L5       Family History:    Family History   Problem Relation Age of Onset     Gastrointestinal Disease Brother         severe Crohn's     Neurologic Disorder Brother         Seizures post head injury     Depression Brother      Substance Abuse Brother      Genitourinary Problems Father         kidney stones     Diabetes Father      Heart Disease Father         Open heart surgery     Breast Cancer Maternal Grandmother      Parkinsonism Maternal Grandmother      Cerebrovascular Disease Paternal Grandmother      Cancer Maternal Grandfather         Lung     Cardiovascular Paternal Grandfather         Heart Attack     Substance Abuse Mother      Lung Cancer Paternal Uncle      Cancer Paternal Uncle      Lung Cancer Maternal Uncle        Social History:  Marital Status:  Single [1]  Social History     Tobacco Use     Smoking status: Current Every Day Smoker     Packs/day: 0.50     Years: 5.00     Pack years: 2.50     Types: Cigarettes     Smokeless tobacco: Never Used   Substance Use Topics     Alcohol use: Not Currently     Comment: Not since last July     Drug use: Yes     Types: Marijuana     Comment: Medical Marijuana currently-- More CDB         Medications:      dicyclomine (BENTYL) 20 MG tablet   methylPREDNISolone (MEDROL DOSEPAK) 4 MG tablet therapy pack   acetaminophen (TYLENOL) 500 MG tablet   albuterol (2.5 MG/3ML) 0.083% neb solution   albuterol (PROAIR HFA/PROVENTIL HFA/VENTOLIN HFA) 108 (90 Base) MCG/ACT Inhaler   ARIPiprazole (ABILIFY) 15 MG tablet   busPIRone (BUSPAR)  15 MG tablet   clonazePAM (KLONOPIN) 0.5 MG tablet   cyclobenzaprine (FLEXERIL) 10 MG tablet   diphenhydrAMINE (BENADRYL) 50 MG capsule   DULoxetine (CYMBALTA) 60 MG EC capsule   EPINEPHrine (EPIPEN/ADRENACLICK/OR ANY BX GENERIC EQUIV) 0.3 MG/0.3ML injection 2-pack   gabapentin (NEURONTIN) 300 MG capsule   levocetirizine (XYZAL) 5 MG tablet   meclizine (ANTIVERT) 12.5 MG tablet   medical cannabis (Patient's own supply)   medroxyPROGESTERone (DEPO-PROVERA) 150 MG/ML IM injection   mupirocin (BACTROBAN) 2 % external ointment   NONFORMULARY   nystatin (MYCOSTATIN) cream   [START ON 6/28/2019] oxyCODONE (ROXICODONE) 5 MG tablet   pantoprazole (PROTONIX) 40 MG EC tablet   polyethylene glycol (MIRALAX/GLYCOLAX) powder   prazosin (MINIPRESS) 1 MG capsule   prochlorperazine (COMPAZINE) 10 MG tablet   prochlorperazine (COMPAZINE) 25 MG suppository   promethazine (PHENERGAN) 25 MG tablet   rizatriptan (MAXALT-MLT) 5 MG ODT tab   sucralfate (CARAFATE) 1 GM tablet   varenicline (CHANTIX) 1 MG tablet         Review of Systems   Constitutional: Negative for fever.   HENT: Negative for congestion.    Eyes: Negative for redness.   Respiratory: Negative for shortness of breath.    Cardiovascular: Negative for chest pain.   Gastrointestinal: Positive for abdominal pain, blood in stool and diarrhea.   Genitourinary: Negative for difficulty urinating.   Musculoskeletal: Negative for arthralgias and neck stiffness.   Skin: Negative for color change.   Neurological: Negative for headaches.   Psychiatric/Behavioral: Negative for confusion.       Physical Exam   BP: (!) 139/98  Pulse: 85  Temp: 97.2  F (36.2  C)  Resp: 18  Weight: 81.6 kg (180 lb)  SpO2: 98 %      Physical Exam   Constitutional: No distress.   HENT:   Head: Atraumatic.   Mouth/Throat: Oropharynx is clear and moist. No oropharyngeal exudate.   Eyes: Pupils are equal, round, and reactive to light. No scleral icterus.   Cardiovascular: Normal heart sounds and intact distal  pulses.   Pulmonary/Chest: Breath sounds normal. No respiratory distress.   Abdominal: Soft. Bowel sounds are normal. She exhibits no distension and no mass. There is tenderness (Generalized). There is no rebound and no guarding. No hernia.   Musculoskeletal: She exhibits no edema or tenderness.   Skin: Skin is warm. Capillary refill takes less than 2 seconds. No rash noted. She is not diaphoretic.   Psychiatric: She has a normal mood and affect.   Nursing note and vitals reviewed.      ED Course        Procedures                   No results found. However, due to the size of the patient record, not all encounters were searched. Please check Results Review for a complete set of results.   Results for orders placed or performed during the hospital encounter of 06/08/19   CT Abdomen Pelvis w/o Contrast    Narrative    CT ABDOMEN PELVIS W/O CONTRAST 6/8/2019 4:04 PM    HISTORY: Abdominal pain.    TECHNIQUE: CT imaging of the abdomen and pelvis is performed without  IV contrast.  Abdominal organs, pelvic organs, bowel, aorta,  retroperitoneum, and abdominal wall are also assessed to the limits of  no IV contrast.  Radiation dose for this scan was reduced using  automated exposure control, adjustment of the mA and/or kV according  to patient size, or iterative reconstruction technique.    COMPARISON: May 7, 2019.    FINDINGS:    Liver: Normal.  Gallbladder: Surgically absent.  Pancreas:Normal.  Spleen:Normal.  Adrenals:Normal.  Ascites:None.    Kidneys/ureters: There are tiny punctate bilateral nonobstructing  renal stones, as before. No hydronephrosis.   Bladder:Normal.  Pelvic free fluid:None.    Bowels: Mild bowel wall thickening of the transverse and descending  colon which could reflect mild colitis. No evidence of obstruction.  Appendix:Normal.    Abdominal or pelvic lymphadenopathy:None.    Miscellaneous findings:None.      Impression    IMPRESSION:    1. Mild bowel wall thickening of the transverse and  descending colon  which could reflect mild colitis.  2. Status post cholecystectomy.  3. Tiny punctate bilateral nonobstructing renal stones.    LB JOE MD   CBC with platelets differential   Result Value Ref Range    WBC 5.4 4.0 - 11.0 10e9/L    RBC Count 4.09 3.8 - 5.2 10e12/L    Hemoglobin 13.5 11.7 - 15.7 g/dL    Hematocrit 38.8 35.0 - 47.0 %    MCV 95 78 - 100 fl    MCH 33.0 26.5 - 33.0 pg    MCHC 34.8 31.5 - 36.5 g/dL    RDW 12.4 10.0 - 15.0 %    Platelet Count 278 150 - 450 10e9/L    Diff Method Automated Method     % Neutrophils 79.1 %    % Lymphocytes 12.9 %    % Monocytes 7.4 %    % Eosinophils 0.2 %    % Basophils 0.2 %    % Immature Granulocytes 0.2 %    Nucleated RBCs 0 0 /100    Absolute Neutrophil 4.3 1.6 - 8.3 10e9/L    Absolute Lymphocytes 0.7 (L) 0.8 - 5.3 10e9/L    Absolute Monocytes 0.4 0.0 - 1.3 10e9/L    Absolute Basophils 0.0 0.0 - 0.2 10e9/L    Abs Immature Granulocytes 0.0 0 - 0.4 10e9/L    Absolute Nucleated RBC 0.0    Comprehensive metabolic panel   Result Value Ref Range    Sodium 142 133 - 144 mmol/L    Potassium 4.3 3.4 - 5.3 mmol/L    Chloride 110 (H) 94 - 109 mmol/L    Carbon Dioxide 26 20 - 32 mmol/L    Anion Gap 6 3 - 14 mmol/L    Glucose 101 (H) 70 - 99 mg/dL    Urea Nitrogen 10 7 - 30 mg/dL    Creatinine 0.71 0.52 - 1.04 mg/dL    GFR Estimate >90 >60 mL/min/[1.73_m2]    GFR Estimate If Black >90 >60 mL/min/[1.73_m2]    Calcium 9.6 8.5 - 10.1 mg/dL    Bilirubin Total 0.5 0.2 - 1.3 mg/dL    Albumin 3.7 3.4 - 5.0 g/dL    Protein Total 7.6 6.8 - 8.8 g/dL    Alkaline Phosphatase 111 40 - 150 U/L    ALT 32 0 - 50 U/L    AST 16 0 - 45 U/L   Lipase   Result Value Ref Range    Lipase 51 (L) 73 - 393 U/L   Occult blood stool   Result Value Ref Range    Occult Blood Negative NEG^Negative     *Note: Due to a large number of results and/or encounters for the requested time period, some results have not been displayed. A complete set of results can be found in Results Review.          Medications   HYDROmorphone (DILAUDID) injection 1 mg (1 mg Intramuscular Given 6/20/19 4455)       Assessments & Plan (with Medical Decision Making)  Katy is 29 years of age.  She unfortunately has a chronic pain syndrome and has frequent ER visits which at times can make it difficult to sort through her pain complaints.  Today she presented with complaints of abdominal pain associate with some increased diarrhea with occasional blood-tinged mucus.  She has a history for Crohn's colitis.  She presents concerned that she might have a recurrence of flare.  She was seen for similar complaints on June 8.  Laboratory work and CT imaging was reviewed from that visit.  Seat T at that time did show that she had some inflammation visible in the transverse and descending colon consistent with reactivation of her colitis.  She was treated that time with antiemetics, hydration and 1 dose of Solu-Medrol 1 g.  Was not sent home on any steroid taper.  States she had some transient improvement but is continued having lingering symptoms over the last few weeks.  Today she was sent in by her GI doctor for assessment.  Her abdomen only showed mild tenderness on examination.  She did not have any bloody stools or diarrhea in the emergency department.  I felt that it is appropriate to treat her based upon her symptoms and did not put her through an additional work-up and have reviewed findings from June 8.  She was sent home with a Medrol Dosepak.  Instructed to follow-up with her GI specialist.  For acute pain management she received Dilaudid 1 mg IM before departure.  We verify that she did have a ride home because she has in the past indicated that she has had a rides in his left driving on her own accord against our recommendations.  Did not send her home with an additional pain management and advised that she must obtain those medications from her doctor managing her pain contract.     I have reviewed the nursing  notes.    I have reviewed the findings, diagnosis, plan and need for follow up with the patient.         Medication List      Started    methylPREDNISolone 4 MG tablet therapy pack  Commonly known as:  MEDROL DOSEPAK  follow package directions            Final diagnoses:   Colitis       6/20/2019   [facility] EMERGENCY DEPARTMENT     Stone Hillman,   06/20/19 1525

## 2019-06-20 NOTE — ED NOTES
Patient is physically in ED rm 15.  Unable to move patient to the room the ED trackboard due to chart being locked at [facility] by gastro.  Provider notified and will be in to assess patient.  ED registration is working to fix this problem.

## 2019-06-20 NOTE — ED AVS SNAPSHOT
[facility] Emergency Department  [address] [place] [address]  Phone:  [phone]  Fax:  [phone]                                    Katy Islas   MRN: [id number]    Department:  [facility] Emergency Department   Date of Visit:  6/20/2019           After Visit Summary Signature Page    I have received my discharge instructions, and my questions have been answered. I have discussed any challenges I see with this plan with the nurse or doctor.    ..........................................................................................................................................  Patient/Patient Representative Signature      ..........................................................................................................................................  Patient Representative Print Name and Relationship to Patient    ..................................................               ................................................  Date                                   Time    ..........................................................................................................................................  Reviewed by Signature/Title    ...................................................              ..............................................  Date                                               Time          22EPIC Rev 08/18

## 2019-06-20 NOTE — TELEPHONE ENCOUNTER
"[facility]  calling for patient.  Patient is calling for help with abdominal pain, hx of colitis. Having diarrhea every 30 min with blood in her stool. Triaged    1. LOCATION: \"Where does it hurt?\"       Low abdomen, in my colon area    2. RADIATION: \"Does the pain shoot anywhere else?\" (e.g., chest, back)      no    3. ONSET: \"When did the pain begin?\" (e.g., minutes, hours or days ago)       This morning    4. SUDDEN: \"Gradual or sudden onset?\"      About 15 min after waking up today, began - 'sudden?'        5. PATTERN \"Does the pain come and go, or is it constant?\"     - If constant: \"Is it getting better, staying the same, or worsening?\"       (Note: Constant means the pain never goes away completely; most serious pain is constant and it progresses)      - If intermittent: \"How long does it last?\" \"Do you have pain now?\"      (Note: Intermittent means the pain goes away completely between bouts)      constant    6. SEVERITY: \"How bad is the pain?\"  (e.g., Scale 1-10; mild, moderate, or severe)    - MILD (1-3): doesn't interfere with normal activities, abdomen soft and not tender to touch     - MODERATE (4-7): interferes with normal activities or awakens from sleep, tender to touch     - SEVERE (8-10): excruciating pain, doubled over, unable to do any normal activities       6/10, but she is also not able to do any of her normal activities    7. RECURRENT SYMPTOM: \"Have you ever had this type of abdominal pain before?\" If so, ask: \"When was the last time?\" and \"What happened that time?\"       Yes, this is the same pain as with previous colitis flare ups    8. CAUSE: \"What do you think is causing the abdominal pain?\"      'colitis flare up'    9. RELIEVING/AGGRAVATING FACTORS: \"What makes it better or worse?\" (e.g., movement, antacids, bowel movement)       Eating makes cramping worse and diarrhea right away   Laying down is making pain tolerable    10. OTHER SYMPTOMS: \"Has there been any " "vomiting, diarrhea, constipation, or urine problems?\"        Diarrhea with red blood mixed in   Denies passing clots of blood or black stool   Reports hx of hemorrhoid, says this seems to be more blood than she had previously     11. PREGNANCY: \"Is there any chance you are pregnant?\" \"When was your last menstrual period?\"        No, LMP: May sometime, depo shot currently, so she does not think there is any way she is pregnant.    States the last time she had this pain and it was colitis, she had to be seen in the ER. IV medications were given to her to treat it.    Reason for Disposition    Bloody, black, or tarry bowel movements    Additional Information    Negative: Passed out (i.e., fainted, collapsed and was not responding)    Negative: Shock suspected (e.g., cold/pale/clammy skin, too weak to stand, low BP, rapid pulse)    Negative: Sounds like a life-threatening emergency to the triager    Negative: Chest pain    Negative: Pain is mainly in upper abdomen (if needed ask: 'is it mainly above the belly button?')    Negative: Abdominal pain and pregnant > 20 weeks    Negative: Abdominal pain and pregnant < 20 weeks    Protocols used: ABDOMINAL PAIN - FEMALE-A-OH    Disposition: Go to the ED    Marianna Layne, RN, BSN      "

## 2019-06-20 NOTE — TELEPHONE ENCOUNTER
[facility] Call Center    Phone Message    May a detailed message be left on voicemail: yes    Reason for Call: Other: Pt is requesting a call back from care team to discuss test that she was unable to complete at previous appt - a tube through her nose. She also noticed some abdominal pain and blood this morning. Please advise.     Action Taken: Message routed to:  Adult Clinics: Gastroenterology (GI) p [id number]

## 2019-06-20 NOTE — DISCHARGE INSTRUCTIONS
Use Compazine and Zofran that you have at home for nausea.  Drink more water for better hydration  Take medrol dose pack as directed   Follow up wit your GI doctor

## 2019-06-20 NOTE — TELEPHONE ENCOUNTER
Call to patient who states that she had just heard from her PCP who stated that she should go to the ER today. Patient states that she was to have a motility study of esophagus and had it canceled because she had refused to have tube down the nose. Patient indicates that she has no issues with tubes in throat, but cannot bear to have tube in nose.   Present symptoms include: increased abdominal pain, diarrhea, and gross blood in her stool. Advised that would agree with previous suggestion to be evaluated in the ER.   Advised patient that in the ER today they will do an assessment and evaluation of her present symptoms, and discuss with her the recommendations that are made based on this. Advised patient that the treatment plan would be discussed with her at the hospital as they may be doing further studies. Patient was advised to take this one step at a time, see what the ER states and recommends, and then to follow up with GI afterwards with any questions or concerns. Cristina Pritchett PA-C updated on patient's status. Sheryl Serrano RN

## 2019-06-20 NOTE — ED TRIAGE NOTES
Pt was told to come to ED as she is experiencing a Crohns Flare x this am-sx  abd pain, diarrhea with blood.  These are usual sx for her-not new.

## 2019-06-21 ENCOUNTER — PATIENT OUTREACH (OUTPATIENT)
Dept: CARE COORDINATION | Facility: CLINIC | Age: 29
End: 2019-06-21

## 2019-06-21 ASSESSMENT — ACTIVITIES OF DAILY LIVING (ADL): DEPENDENT_IADLS:: INDEPENDENT

## 2019-06-21 NOTE — PROGRESS NOTES
"Clinic Care Coordination Contact  Clinic Care Coordination Contact  OUTREACH  Referral Information:  Referral Source: ED Follow-Up  Primary Diagnosis: GI Disorders(Colitis)  Clinic Utilization  Difficulty keeping appointments:: Yes  Compliance Concerns: No  No-Show Concerns: Yes  No PCP office visit in Past Year: No  Utilization    Last refreshed: 6/21/2019  6:30 AM:  Hospital Admissions 0           Last refreshed: 6/21/2019  6:30 AM:  ED Visits 14           Last refreshed: 6/20/2019  3:56 PM:  No Show Count (past year) 18              Current as of: 6/20/2019  3:56 PM          Clinical Concerns:  Current Medical Concerns:  Patient was seen at [facility] and diagnosed with colitis.  Patient has hearing loss, call was conducted using \"video relay\" service. Called patient introduced self and role.   Patient is doing \"pretty good\" today reporting she is feeling better than yesterday. She plans to follow up with GI, and picked up the steroid dose pack from the pharmacy this morning. She has not vomited in 2 week, and she has \"a little\" abdominal pain today, but is \"not as bad\" as yesterday. Patient has copy of discharge summary and will follow. CC RN advised she can call the clinic over the wknd in the case something comes up and she will be transferred to the 24 hr nurse triage line. She verbalized understanding and has no further concerns.     Current Behavioral Concerns: none, patient was appreciative of call.    Education Provided to patient: RN CC educated about Care Coordination Services, discharge instructions, medications reviewed and follow up    Pain  Pain (GOAL):: Yes  Type: Chronic (>3mo)  Location of chronic pain:: low back pain  Health Maintenance Reviewed:    Clinical Pathway: None  Medication Management:  She picked up the prescription and is taking.  Functional Status:  Dependent ADLs:: Independent  Dependent IADLs:: Independent  Bed or wheelchair confined:: No  Mobility Status: Independent    Living " Situation:  Current living arrangement:: Not Assessed    Diet/Exercise/Sleep:  Diet:: Regular  Inadequate nutrition (GOAL):: No  Food Insecurity: No  Tube Feeding: No  Exercise:: Currently not exercising  Inadequate activity/exercise (GOAL):: No  Significant changes in sleep pattern (GOAL): No    Transportation:  Transportation concerns (GOAL):: No  Transportation means:: Regular car     Psychosocial:  Holiness or spiritual beliefs that impact treatment:: No  Mental health DX:: Yes  Mental health DX how managed:: Medication  Mental health management concern (GOAL):: No  Informal Support system:: Family, Parent     Financial/Insurance:   Financial/Insurance concerns (GOAL):: Yes     Resources and Interventions:  Current Resources: ER discharge, clinic and CC RN   Community Resources: County Worker, Other (see comment)(Disability)  Supplies used at home:: Nebulizer tubing  Equipment Currently Used at Home: none    Advance Care Plan/Directive  Advanced Care Plans/Directives on file:: No     Goals: na  Patient/Caregiver understanding: yes    Outreach Frequency: monthly  Future Appointments              In 2 weeks Aura Rosario, [facility]    In 3 weeks Needed, , [facility]    In 1 month Bernie Wilson RD [facility]        Plan: 1. Patient will follow discharge summary.  2. Patient will follow up sooner should symptoms worsen, change or patient feels there is a need further care.  3. No further Care Coordination needs identified at this time. Patient may be referred to Care Coordination in the future if additional needs arise.  Pt encouraged to contact Care Coordinator through the clinic if situation changes and assistance is needed. No follow-up planned.    RADHA Otto RN Clinic Care Coordinator   [facility]  Phone: [phone]

## 2019-06-22 ENCOUNTER — NURSE TRIAGE (OUTPATIENT)
Dept: NURSING | Facility: CLINIC | Age: 29
End: 2019-06-22

## 2019-06-22 ENCOUNTER — APPOINTMENT (OUTPATIENT)
Dept: CT IMAGING | Facility: CLINIC | Age: 29
End: 2019-06-22
Attending: EMERGENCY MEDICINE
Payer: MEDICARE

## 2019-06-22 ENCOUNTER — HOSPITAL ENCOUNTER (EMERGENCY)
Facility: CLINIC | Age: 29
Discharge: HOME | End: 2019-06-22
Attending: EMERGENCY MEDICINE | Admitting: EMERGENCY MEDICINE
Payer: MEDICARE

## 2019-06-22 VITALS
DIASTOLIC BLOOD PRESSURE: 95 MMHG | WEIGHT: 180.2 LBS | HEART RATE: 60 BPM | SYSTOLIC BLOOD PRESSURE: 138 MMHG | TEMPERATURE: 98.8 F | OXYGEN SATURATION: 99 % | RESPIRATION RATE: 14 BRPM | BODY MASS INDEX: 32.96 KG/M2

## 2019-06-22 DIAGNOSIS — G89.4 CHRONIC PAIN SYNDROME: ICD-10-CM

## 2019-06-22 DIAGNOSIS — E87.6 HYPOPOTASSEMIA: ICD-10-CM

## 2019-06-22 DIAGNOSIS — R10.84 ABDOMINAL PAIN, GENERALIZED: ICD-10-CM

## 2019-06-22 DIAGNOSIS — M50.30 BULGE OF CERVICAL DISC WITHOUT MYELOPATHY: ICD-10-CM

## 2019-06-22 LAB
ALBUMIN SERPL-MCNC: 3.5 G/DL (ref 3.4–5)
ALBUMIN UR-MCNC: NEGATIVE MG/DL
ALP SERPL-CCNC: 84 U/L (ref 40–150)
ALT SERPL W P-5'-P-CCNC: 23 U/L (ref 0–50)
ANION GAP SERPL CALCULATED.3IONS-SCNC: 7 MMOL/L (ref 3–14)
APPEARANCE UR: CLEAR
AST SERPL W P-5'-P-CCNC: 7 U/L (ref 0–45)
BACTERIA #/AREA URNS HPF: ABNORMAL /HPF
BASOPHILS # BLD AUTO: 0 10E9/L (ref 0–0.2)
BASOPHILS NFR BLD AUTO: 0.3 %
BILIRUB SERPL-MCNC: 0.4 MG/DL (ref 0.2–1.3)
BILIRUB UR QL STRIP: NEGATIVE
BUN SERPL-MCNC: 8 MG/DL (ref 7–30)
CALCIUM SERPL-MCNC: 8.5 MG/DL (ref 8.5–10.1)
CHLORIDE SERPL-SCNC: 112 MMOL/L (ref 94–109)
CO2 SERPL-SCNC: 23 MMOL/L (ref 20–32)
COLOR UR AUTO: ABNORMAL
CREAT SERPL-MCNC: 0.68 MG/DL (ref 0.52–1.04)
DIFFERENTIAL METHOD BLD: NORMAL
EOSINOPHIL # BLD AUTO: 0 10E9/L (ref 0–0.7)
EOSINOPHIL NFR BLD AUTO: 0.5 %
ERYTHROCYTE [DISTWIDTH] IN BLOOD BY AUTOMATED COUNT: 12.4 % (ref 10–15)
GFR SERPL CREATININE-BSD FRML MDRD: >90 ML/MIN/{1.73_M2}
GLUCOSE SERPL-MCNC: 74 MG/DL (ref 70–99)
GLUCOSE UR STRIP-MCNC: NEGATIVE MG/DL
HCT VFR BLD AUTO: 38.4 % (ref 35–47)
HGB BLD-MCNC: 12.5 G/DL (ref 11.7–15.7)
HGB UR QL STRIP: NEGATIVE
IMM GRANULOCYTES # BLD: 0 10E9/L (ref 0–0.4)
IMM GRANULOCYTES NFR BLD: 0.5 %
INR PPP: 1.08 (ref 0.86–1.14)
KETONES UR STRIP-MCNC: NEGATIVE MG/DL
LEUKOCYTE ESTERASE UR QL STRIP: ABNORMAL
LIPASE SERPL-CCNC: 101 U/L (ref 73–393)
LYMPHOCYTES # BLD AUTO: 1.5 10E9/L (ref 0.8–5.3)
LYMPHOCYTES NFR BLD AUTO: 23 %
MCH RBC QN AUTO: 31.5 PG (ref 26.5–33)
MCHC RBC AUTO-ENTMCNC: 32.6 G/DL (ref 31.5–36.5)
MCV RBC AUTO: 97 FL (ref 78–100)
MONOCYTES # BLD AUTO: 0.6 10E9/L (ref 0–1.3)
MONOCYTES NFR BLD AUTO: 9.3 %
MUCOUS THREADS #/AREA URNS LPF: PRESENT /LPF
NEUTROPHILS # BLD AUTO: 4.4 10E9/L (ref 1.6–8.3)
NEUTROPHILS NFR BLD AUTO: 66.4 %
NITRATE UR QL: NEGATIVE
NRBC # BLD AUTO: 0 10*3/UL
NRBC BLD AUTO-RTO: 0 /100
PH UR STRIP: 5.5 PH (ref 5–7)
PLATELET # BLD AUTO: 251 10E9/L (ref 150–450)
POTASSIUM SERPL-SCNC: 3 MMOL/L (ref 3.4–5.3)
PROT SERPL-MCNC: 6.9 G/DL (ref 6.8–8.8)
RBC # BLD AUTO: 3.97 10E12/L (ref 3.8–5.2)
RBC #/AREA URNS AUTO: 1 /HPF (ref 0–2)
SODIUM SERPL-SCNC: 142 MMOL/L (ref 133–144)
SOURCE: ABNORMAL
SP GR UR STRIP: 1.02 (ref 1–1.03)
SQUAMOUS #/AREA URNS AUTO: 2 /HPF (ref 0–1)
TRANS CELLS #/AREA URNS HPF: <1 /HPF (ref 0–1)
UROBILINOGEN UR STRIP-MCNC: NORMAL MG/DL (ref 0–2)
WBC # BLD AUTO: 6.7 10E9/L (ref 4–11)
WBC #/AREA URNS AUTO: 4 /HPF (ref 0–5)

## 2019-06-22 PROCEDURE — 74176 CT ABD & PELVIS W/O CONTRAST: CPT

## 2019-06-22 PROCEDURE — 96375 TX/PRO/DX INJ NEW DRUG ADDON: CPT

## 2019-06-22 PROCEDURE — 96361 HYDRATE IV INFUSION ADD-ON: CPT

## 2019-06-22 PROCEDURE — 96376 TX/PRO/DX INJ SAME DRUG ADON: CPT

## 2019-06-22 PROCEDURE — 99285 EMERGENCY DEPT VISIT HI MDM: CPT | Mod: Z6 | Performed by: EMERGENCY MEDICINE

## 2019-06-22 PROCEDURE — 96374 THER/PROPH/DIAG INJ IV PUSH: CPT

## 2019-06-22 PROCEDURE — 81001 URINALYSIS AUTO W/SCOPE: CPT | Performed by: EMERGENCY MEDICINE

## 2019-06-22 PROCEDURE — 99285 EMERGENCY DEPT VISIT HI MDM: CPT | Mod: 25

## 2019-06-22 PROCEDURE — 25000128 H RX IP 250 OP 636: Performed by: EMERGENCY MEDICINE

## 2019-06-22 PROCEDURE — 85610 PROTHROMBIN TIME: CPT | Performed by: EMERGENCY MEDICINE

## 2019-06-22 PROCEDURE — 83690 ASSAY OF LIPASE: CPT | Performed by: EMERGENCY MEDICINE

## 2019-06-22 PROCEDURE — 25000132 ZZH RX MED GY IP 250 OP 250 PS 637: Mod: GY | Performed by: EMERGENCY MEDICINE

## 2019-06-22 PROCEDURE — 80053 COMPREHEN METABOLIC PANEL: CPT | Performed by: EMERGENCY MEDICINE

## 2019-06-22 PROCEDURE — 85025 COMPLETE CBC W/AUTO DIFF WBC: CPT | Performed by: EMERGENCY MEDICINE

## 2019-06-22 PROCEDURE — 25800030 ZZH RX IP 258 OP 636: Performed by: EMERGENCY MEDICINE

## 2019-06-22 RX ORDER — LIDOCAINE HYDROCHLORIDE 20 MG/ML
INJECTION, SOLUTION EPIDURAL; INFILTRATION; INTRACAUDAL; PERINEURAL
Status: DISCONTINUED
Start: 2019-06-22 | End: 2019-06-22 | Stop reason: HOSPADM

## 2019-06-22 RX ORDER — HYDROMORPHONE HYDROCHLORIDE 1 MG/ML
0.5 INJECTION, SOLUTION INTRAMUSCULAR; INTRAVENOUS; SUBCUTANEOUS
Status: DISCONTINUED | OUTPATIENT
Start: 2019-06-22 | End: 2019-06-22 | Stop reason: HOSPADM

## 2019-06-22 RX ORDER — ONDANSETRON 4 MG/1
4 TABLET, ORALLY DISINTEGRATING ORAL EVERY 6 HOURS PRN
Qty: 10 TABLET | Refills: 0 | Status: SHIPPED | OUTPATIENT
Start: 2019-06-22 | End: 2019-09-11

## 2019-06-22 RX ORDER — HYDROMORPHONE HYDROCHLORIDE 1 MG/ML
0.5 INJECTION, SOLUTION INTRAMUSCULAR; INTRAVENOUS; SUBCUTANEOUS
Status: COMPLETED | OUTPATIENT
Start: 2019-06-22 | End: 2019-06-22

## 2019-06-22 RX ORDER — OXYCODONE HYDROCHLORIDE 5 MG/1
5 TABLET ORAL EVERY 6 HOURS PRN
Qty: 12 TABLET | Refills: 0 | Status: SHIPPED | OUTPATIENT
Start: 2019-06-22 | End: 2019-06-25

## 2019-06-22 RX ORDER — ONDANSETRON 2 MG/ML
4 INJECTION INTRAMUSCULAR; INTRAVENOUS
Status: COMPLETED | OUTPATIENT
Start: 2019-06-22 | End: 2019-06-22

## 2019-06-22 RX ORDER — SODIUM CHLORIDE 9 MG/ML
1000 INJECTION, SOLUTION INTRAVENOUS CONTINUOUS
Status: DISCONTINUED | OUTPATIENT
Start: 2019-06-22 | End: 2019-06-22 | Stop reason: HOSPADM

## 2019-06-22 RX ORDER — POTASSIUM CHLORIDE 750 MG/1
40 TABLET, EXTENDED RELEASE ORAL ONCE
Status: COMPLETED | OUTPATIENT
Start: 2019-06-22 | End: 2019-06-22

## 2019-06-22 RX ORDER — DIPHENHYDRAMINE HYDROCHLORIDE 50 MG/ML
25 INJECTION INTRAMUSCULAR; INTRAVENOUS ONCE
Status: COMPLETED | OUTPATIENT
Start: 2019-06-22 | End: 2019-06-22

## 2019-06-22 RX ORDER — ONDANSETRON 2 MG/ML
4 INJECTION INTRAMUSCULAR; INTRAVENOUS ONCE
Status: COMPLETED | OUTPATIENT
Start: 2019-06-22 | End: 2019-06-22

## 2019-06-22 RX ADMIN — DIPHENHYDRAMINE HYDROCHLORIDE 25 MG: 50 INJECTION, SOLUTION INTRAMUSCULAR; INTRAVENOUS at 18:50

## 2019-06-22 RX ADMIN — HYDROMORPHONE HYDROCHLORIDE 0.5 MG: 1 INJECTION, SOLUTION INTRAMUSCULAR; INTRAVENOUS; SUBCUTANEOUS at 17:21

## 2019-06-22 RX ADMIN — SODIUM CHLORIDE 1000 ML: 9 INJECTION, SOLUTION INTRAVENOUS at 18:47

## 2019-06-22 RX ADMIN — ONDANSETRON 4 MG: 2 INJECTION INTRAMUSCULAR; INTRAVENOUS at 18:49

## 2019-06-22 RX ADMIN — ONDANSETRON 4 MG: 2 INJECTION INTRAMUSCULAR; INTRAVENOUS at 17:20

## 2019-06-22 RX ADMIN — POTASSIUM CHLORIDE 40 MEQ: 10 TABLET, EXTENDED RELEASE ORAL at 20:29

## 2019-06-22 RX ADMIN — HYDROMORPHONE HYDROCHLORIDE 0.5 MG: 1 INJECTION, SOLUTION INTRAMUSCULAR; INTRAVENOUS; SUBCUTANEOUS at 18:53

## 2019-06-22 RX ADMIN — HYDROMORPHONE HYDROCHLORIDE 0.5 MG: 1 INJECTION, SOLUTION INTRAMUSCULAR; INTRAVENOUS; SUBCUTANEOUS at 18:16

## 2019-06-22 RX ADMIN — SODIUM CHLORIDE 1000 ML: 9 INJECTION, SOLUTION INTRAVENOUS at 17:20

## 2019-06-22 NOTE — ED AVS SNAPSHOT
[facility], [place], Emergency Department  [address] [place] [id number]  Phone:  [phone]                                    Katy Islas   MRN: [id number]    Department:  [facility], Emergency Department   Date of Visit:  6/22/2019           After Visit Summary Signature Page    I have received my discharge instructions, and my questions have been answered. I have discussed any challenges I see with this plan with the nurse or doctor.    ..........................................................................................................................................  Patient/Patient Representative Signature      ..........................................................................................................................................  Patient Representative Print Name and Relationship to Patient    ..................................................               ................................................  Date                                   Time    ..........................................................................................................................................  Reviewed by Signature/Title    ...................................................              ..............................................  Date                                               Time          22EPIC Rev 08/18

## 2019-06-22 NOTE — TELEPHONE ENCOUNTER
"Patient calling with  from [employer] with ID#: [id number].    Patient states she continues to have abdominal pain. She was discharged from the emergency department on 6/20/2019 with abdominal pain and blood in stool. States pain is not worse and rates it at 5/10. Reports having \"little bit of blood\" in diarrhea. Per guideline, advised patient to be seen within 2 weeks. Patient states she will make an appointment to be seen Monday 6/24/2019.    Transferred patient to central scheduling.     Brendan Villalba RN  [facility]     Reason for Disposition    Abdominal pain is a chronic symptom (recurrent or ongoing AND present > 4 weeks)    Additional Information    Negative: Shock suspected (e.g., cold/pale/clammy skin, too weak to stand, low BP, rapid pulse)    Negative: Difficult to awaken or acting confused (e.g., disoriented, slurred speech)    Negative: Passed out (i.e., lost consciousness, collapsed and was not responding)    Negative: Sounds like a life-threatening emergency to the triager    Negative: [1] SEVERE pain (e.g., excruciating) AND [2] present > 1 hour    Negative: [1] SEVERE pain AND [2] age > 60    Negative: [1] Vomiting AND [2] contains red blood or black (\"coffee ground\") material  (Exception: few red streaks in vomit that only happened once)    Negative: Blood in bowel movements   (Exception: blood on surface of BM with constipation)    Negative: Black or tarry bowel movements  (Exception: chronic-unchanged  black-grey bowel movements AND is taking iron pills or Pepto-bismol)    Negative: Patient sounds very sick or weak to the triager    Negative: [1] MILD-MODERATE pain AND [2] constant AND [3] present > 2 hours    Negative: [1] Vomiting AND [2] abdomen looks much more swollen than usual    Negative: [1] Vomiting AND [2] contains bile (green color)    Negative: White of the eyes have turned yellow (i.e., jaundice)    Negative: Fever > 103 F (39.4 C)    Negative: " [1] Fever > 101 F (38.3 C) AND [2] age > 60    Negative: [1] Fever > 100.0 F (37.8 C) AND [2] bedridden (e.g., nursing home patient, CVA, chronic illness, recovering from surgery)    Negative: [1] Fever > 100.0 F (37.8 C) AND [2] diabetes mellitus or weak immune system (e.g., HIV positive, cancer chemo, splenectomy, chronic steroids)    Negative: [1] SEVERE pain AND [2] present < 1 hour    Negative: [1] MILD pain (e.g., does not interfere with normal activities) AND [2] pain comes and goes (cramps) AND [3] present > 48 hours    Negative: [1] MODERATE pain (e.g., interferes with normal activities) AND [2] pain comes and goes (cramps) AND [3] present > 24 hours  (Exception: pain with Vomiting or Diarrhea - see that Guideline)    Negative: Age > 60 years    Negative: Pregnancy suspected (e.g., missed last menstrual period)    Negative: Unusual vaginal discharge (e.g., bad smelling, yellow, green, or foamy-white)    Negative: Blood in urine (red, pink, or tea-colored)    Protocols used: ABDOMINAL PAIN - FEMALE-A-AH

## 2019-06-22 NOTE — ED PROVIDER NOTES
[facility] ([facility])  June 22, 2019    History     Chief Complaint   Patient presents with     Diarrhea     HPI  Katy Islas is a 29 year old female with a history of Crohn's disease who presents to the ER, with her hearing dog named Purvi, for evaluation of increased abdominal pain associated with blood-streaked diarrhea, nausea, and vomiting. Patient states that she had a CT scan done 2 or 3 weeks ago because of increased abdominal pain. and at this time she is on medrol dose pack for her Crohn's disease. Patient states that she has had increased pain since her last CT scan, but denies any fevers. Patient states she has no history of fistula development with her Crohn's disease. Patient denies recent antibiotics. Patient did undergo an abdominal CT scan on 6/8/2019, approximately 2 weeks ago, which revealed some mild bowel wall thickening of the transverse and descending colon, but was otherwise unremarkable.    This part of the medical record was transcribed by Ivanna Barry, Medical Scribe, from a dictation done by Layton Castro MD.     I have reviewed the Medications, Allergies, Past Medical and Surgical History, and Social History in the [employer] system.    Past Medical History:   Diagnosis Date     Abdominal pain 10/31- 11/4/2005    [facility] admit for Crohn's     Allergic rhinitis, cause unspecified     Allergic rhinitis     Arthritis      C. difficile diarrhea     Past, no current diarrhea.     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     Migraine 07/21/12    D/C 07/22/12-[facility]     Migraine, unspecified, without mention of intractable migraine without mention of status migrainosus     Migraine     Mild intermittent asthma     mild intermittent     Mycoplasma infection in conditions classified elsewhere and of unspecified site      Other chronic pain     Back pain for 6 years     Renal disease     Kidney stones     Unspecified hearing loss     congenital hearing loss       Past Surgical History:   Procedure Laterality Date     AS REMOVAL OF COCCYX  10/18/2017     C FUSION OF SACROILIAC JOINT  10/26/2018     COLONOSCOPY  7/1/2009    [facility].     COMBINED ESOPHAGOSCOPY, GASTROSCOPY, DUODENOSCOPY (EGD) WITH CO2 INSUFFLATION N/A 4/12/2019    Procedure: COMBINED ESOPHAGOSCOPY, GASTROSCOPY, DUODENOSCOPY (EGD) WITH CO2 INSUFFLATION;  Surgeon: Edwin Conde MD;  Location: [facility] OR     ESOPHAGOSCOPY, GASTROSCOPY, DUODENOSCOPY (EGD), COMBINED N/A 4/12/2019    Procedure: Combined Esophagoscopy, Gastroscopy, Duodenoscopy (Egd), Biopsy Single Or Multiple;  Surgeon: Edwin Conde MD;  Location: [facility] OR     FUSION SPINE POSTERIOR MINIMALLY INVASIVE ONE LEVEL N/A 2/23/2017    Procedure: FUSION SPINE POSTERIOR MINIMALLY INVASIVE ONE LEVEL;  L4-5 Oblique Lateral Lumbar Interbody Fusion   Epidural steroid injection.   Transpedicular Bone marrow aspiration;  Surgeon: Jeniffer Eugene MD;  Location: [facility] OR     HC COLONOSCOPY THRU STOMA WITH BIOPSY  10/29/2003    Impression is that of normal appearing colonoscopy, without evidence of rectal bleeding.     HC COLONOSCOPY THRU STOMA, DIAGNOSTIC  10/00    normal     HC COLONOSCOPY THRU STOMA, DIAGNOSTIC  Oct 2009    Dr López- normal     HC EEG AWAKE AND SLEEP      abnormal     HC MRI BRAIN W/O CONTRAST  12/00    normal     HC REMOVAL GALLBLADDER  8/5/2009     [facility] [place]     HC UGI ENDOSCOPY DIAG W BIOPSY  11/11/09    Normal esophagus     HC UGI ENDOSCOPY, SIMPLE EXAM  7/00, 10/00    mild chronic esophagitis and duodenitis, neg H pylori     HC UGI ENDOSCOPY, SIMPLE EXAM  01/20/2005    Esophagogastroduodenoscopy, colonoscopy with biopsies.  [facility]     HC UGI ENDOSCOPY, SIMPLE EXAM  7/1/2009    [facility], [facility].      UGI ENDOSCOPY, SIMPLE EXAM  11/11/2009    attempted upper GI, pt. could not tolerate procedure:[facility]     ORTHOPEDIC SURGERY  October 19,2011    diskectomy L4-L5       Family History   Problem Relation Age of Onset     Gastrointestinal Disease Brother         severe Crohn's     Neurologic Disorder Brother         Seizures post head injury     Depression Brother      Substance Abuse Brother      Genitourinary Problems Father         kidney stones     Diabetes Father      Heart Disease Father         Open heart surgery     Breast Cancer Maternal Grandmother      Parkinsonism Maternal Grandmother      Cerebrovascular Disease Paternal Grandmother      Cancer Maternal Grandfather         Lung     Cardiovascular Paternal Grandfather         Heart Attack     Substance Abuse Mother      Lung Cancer Paternal Uncle      Cancer Paternal Uncle      Lung Cancer Maternal Uncle        Social History     Tobacco Use     Smoking status: Current Every Day Smoker     Packs/day: 0.50     Years: 5.00     Pack years: 2.50     Types: Cigarettes     Smokeless tobacco: Never Used   Substance Use Topics     Alcohol use: Not Currently     Comment: Not since last July       Dose / Directions   acetaminophen 500 MG tablet  Commonly known as:  TYLENOL  Used for:  S/P lumbar fusion, Lumbar disc disease with radiculopathy      Dose:  1000 mg  Take 2 tablets (1,000 mg) by mouth 3 times daily as needed for mild pain  Quantity:  100 tablet  Refills:  0     * albuterol 108 (90 Base) MCG/ACT inhaler  Commonly known as:   PROAIR HFA/PROVENTIL HFA/VENTOLIN HFA  Used for:  Mild persistent asthma without complication      Dose:  2 puff  Inhale 2 puffs into the lungs every 6 hours as needed for shortness of breath / dyspnea or wheezing  Quantity:  3 Inhaler  Refills:  3     * albuterol (2.5 MG/3ML) 0.083% neb solution  Commonly known as:  PROVENTIL  Used for:  Mild persistent asthma without complication      Dose:  2.5 mg  Take 1 vial (2.5 mg) by nebulization every 6 hours as needed for shortness of breath / dyspnea or wheezing  Quantity:  50 vial  Refills:  11     ARIPiprazole 15 MG tablet  Commonly known as:  ABILIFY  Used for:  Adjustment disorder with mixed anxiety and depressed mood, Anxiety      Dose:  15 mg  Take 1 tablet (15 mg) by mouth At Bedtime  Quantity:  90 tablet  Refills:  3     busPIRone 15 MG tablet  Commonly known as:  BUSPAR      TK 1 T PO BID  Refills:  5     clonazePAM 0.5 MG tablet  Commonly known as:  klonoPIN      TK 1/2 TO 1 T D PRA  Refills:  5     cyclobenzaprine 10 MG tablet  Commonly known as:  FLEXERIL  Used for:  Chronic bilateral low back pain with left-sided sciatica      Dose:  10 mg  Take 1 tablet (10 mg) by mouth 2 times daily as needed for muscle spasms or other (back pain)  Quantity:  60 tablet  Refills:  0     dicyclomine 20 MG tablet  Commonly known as:  BENTYL  Used for:  Hx of Crohn's disease      Dose:  20 mg  Take 1 tablet (20 mg) by mouth 4 times daily as needed (ABDOMINAL CRAMPING) AS NEEDED FOR CRAMPING  Quantity:  60 tablet  Refills:  0     diphenhydrAMINE 50 MG capsule  Commonly known as:  BENADRYL  Used for:  Mild intermittent asthma without complication      Dose:   mg  Take 1-2 capsules ( mg) by mouth every 6 hours as needed for itching, allergies or sleep  Quantity:  90 capsule  Refills:  3     DULoxetine 60 MG capsule  Commonly known as:  CYMBALTA  Used for:  Adjustment disorder with mixed anxiety and depressed mood      Dose:  60 mg  Take 1 capsule (60 mg) by mouth  daily  Quantity:  90 capsule  Refills:  3     EPINEPHrine 0.3 MG/0.3ML injection 2-pack  Commonly known as:  EPIPEN/ADRENACLICK/or ANY BX GENERIC EQUIV  Used for:  Bee sting allergy      Dose:  0.3 mg  Inject 0.3 mLs (0.3 mg) into the muscle once as needed for anaphylaxis  Quantity:  0.6 mL  Refills:  3     gabapentin 300 MG capsule  Commonly known as:  NEURONTIN  Used for:  Chronic bilateral low back pain with left-sided sciatica      Dose:  300-900 mg  Take 1-3 capsules (300-900 mg) by mouth 3 times daily  Quantity:  270 capsule  Refills:  5     levocetirizine 5 MG tablet  Commonly known as:  XYZAL  Used for:  Seasonal allergic rhinitis due to pollen      Dose:  5 mg  Take 1 tablet (5 mg) by mouth every evening  Quantity:  90 tablet  Refills:  3     meclizine 12.5 MG tablet  Commonly known as:  ANTIVERT  Used for:  Dizziness      Dose:  12.5 mg  Take 1 tablet (12.5 mg) by mouth 4 times daily as needed for dizziness  Quantity:  30 tablet  Refills:  1     medroxyPROGESTERone 150 MG/ML IM injection  Commonly known as:  DEPO-PROVERA  Used for:  Encounter for initial prescription of injectable contraceptive      Dose:  150 mg  Inject 1 mL (150 mg) into the muscle every 3 months  Quantity:  3 mL  Refills:  3     methylPREDNISolone 4 MG tablet therapy pack  Commonly known as:  MEDROL DOSEPAK      follow package directions  Quantity:  21 tablet  Refills:  0     mupirocin 2 % external ointment  Commonly known as:  BACTROBAN      Apply topically 3 times daily  Refills:  0     NONFORMULARY  Indication:  Marina Topical Gel Cream, 1 to 4 pumps  to affect area 4 times daily      Dose:  30 mL  Apply 30 mLs topically 4 times daily  Refills:  0     nystatin 494010 UNIT/GM external cream  Commonly known as:  MYCOSTATIN  Used for:  Nausea      nystatin 100,000 unit/gram topical cream  Refills:  0     pantoprazole 40 MG EC tablet  Commonly known as:  PROTONIX  Used for:  Gastroesophageal reflux disease without esophagitis      Dose:   40 mg  Take 1 tablet (40 mg) by mouth 2 times daily Take 30-60 minutes before a meal.  Quantity:  180 tablet  Refills:  3     polyethylene glycol powder  Commonly known as:  MIRALAX/GLYCOLAX  Used for:  Crohn's disease of colon without complication (H)      Dose:  1 capful  Take 17 g (1 capful) by mouth daily  Quantity:  238 g  Refills:  3     prazosin 1 MG capsule  Commonly known as:  MINIPRESS      Dose:  1 mg  Take 1 mg by mouth At Bedtime  Refills:  5     prochlorperazine 10 MG tablet  Commonly known as:  COMPAZINE  Used for:  Nausea and vomiting, intractability of vomiting not specified, unspecified vomiting type      Dose:  10 mg  Take 1 tablet (10 mg) by mouth every 6 hours as needed for nausea or vomiting  Quantity:  60 tablet  Refills:  3     prochlorperazine 25 MG suppository  Commonly known as:  COMPAZINE  Used for:  Crohn's disease of colon without complication (H)      Dose:  25 mg  Place 1 suppository (25 mg) rectally every 12 hours as needed for nausea  Quantity:  12 suppository  Refills:  3     promethazine 25 MG tablet  Commonly known as:  PHENERGAN  Used for:  Nausea and vomiting, intractability of vomiting not specified, unspecified vomiting type      Dose:  25 mg  Take 1 tablet (25 mg) by mouth every 6 hours as needed for nausea  Quantity:  30 tablet  Refills:  0     rizatriptan 5 MG ODT  Commonly known as:  MAXALT-MLT  Used for:  Migraine with aura and without status migrainosus, not intractable      Dose:  5-10 mg  Take 1-2 tablets (5-10 mg) by mouth at onset of headache for migraine May repeat in 2 hours. Max 6 tablets/24 hours.  Quantity:  18 tablet  Refills:  3     sucralfate 1 GM tablet  Commonly known as:  CARAFATE  Used for:  Hematemesis with nausea, Acute gastritis with hemorrhage, unspecified gastritis type      Dose:  1 g  Take 1 tablet (1 g) by mouth 4 times daily as needed (heartburn)  Quantity:  360 tablet  Refills:  3     varenicline 1 MG tablet  Commonly known as:  CHANTIX  Used  for:  Tobacco use disorder      Dose:  1 mg  Take 1 tablet (1 mg) by mouth 2 times daily  Quantity:  60 tablet  Refills:  3               Allergies   Allergen Reactions     Iohexol Hives     Other reaction(s): Hives  IV contrast; patient states she tolerates contrast if she gets benadryl before  IV contrast; patient states she tolerates contrast if she gets benadryl before     Ativan [Lorazepam] Hives     At the IV site     Baclofen      hives     Bees Hives, Swelling and Difficulty breathing     Caffeine      Contrast Dye      Hives,   Updated 5/10/2016 CT Contrast.     Iodine Hives     Methocarbamol Swelling     Metoclopramide      Other reaction(s): Tremors  LW Reaction: shaking/sweating     Midazolam      Other reaction(s): Agitation     Monosodium Glutamate      Morphine Other (See Comments)     Difficulty with urination     Nsaids Other (See Comments)     GI BLEED x2     Other (Do Not Use) Other (See Comments)     Xanaflex- pt becomes disoriented and loses bladder control     Reglan [Metoclopramide Hcl] Other (See Comments)     shaking     Soma [Carisoprodol] Visual Disturbance     Sleep walking     Tizanidine      Topamax Other (See Comments)     Topamax [Topiramate] Nausea     Tingling  GI/Vomit     Tramadol      Severe Headache, Seizure Risk     Tylenol W/Codeine [Acetaminophen-Codeine] Nausea and Itching     Tylenol 3     Valium Other (See Comments)     Becomes aggressive and angry     Versed Other (See Comments)     Zolmitriptan      Makes face feel like its twitching     Droperidol Anxiety     Flu Virus Vaccine Rash      Arm swelling       Review of Systems   All other systems reviewed and are negative.      Physical Exam   BP: (!) 137/97  Pulse: 109  Heart Rate: 106  Temp: 98.8  F (37.1  C)  Resp: 14  Weight: 81.7 kg (180 lb 3.2 oz)  SpO2: 99 %      Physical Exam   Constitutional: She is oriented to person, place, and time. She appears distressed.   Alert, signs   HENT:   Head: Atraumatic.   Eyes:  Pupils are equal, round, and reactive to light. EOM are normal.   Neck: Neck supple.   Cardiovascular: Regular rhythm.   Pulmonary/Chest: Breath sounds normal.   Abdominal: Soft. There is no rebound and no guarding.   Mild to moderate tenderness in the left mid and upper quadrant   Musculoskeletal: She exhibits no edema or deformity.   Neurological: She is alert and oriented to person, place, and time.   Grossly intact with strength bilaterally   Skin: Skin is warm.   Psychiatric: She has a normal mood and affect.   Nursing note and vitals reviewed.      ED Course         Results for orders placed or performed during the hospital encounter of 06/22/19 (from the past 24 hour(s))   CBC with platelets differential   Result Value Ref Range    WBC 6.7 4.0 - 11.0 10e9/L    RBC Count 3.97 3.8 - 5.2 10e12/L    Hemoglobin 12.5 11.7 - 15.7 g/dL    Hematocrit 38.4 35.0 - 47.0 %    MCV 97 78 - 100 fl    MCH 31.5 26.5 - 33.0 pg    MCHC 32.6 31.5 - 36.5 g/dL    RDW 12.4 10.0 - 15.0 %    Platelet Count 251 150 - 450 10e9/L    Diff Method Automated Method     % Neutrophils 66.4 %    % Lymphocytes 23.0 %    % Monocytes 9.3 %    % Eosinophils 0.5 %    % Basophils 0.3 %    % Immature Granulocytes 0.5 %    Nucleated RBCs 0 0 /100    Absolute Neutrophil 4.4 1.6 - 8.3 10e9/L    Absolute Lymphocytes 1.5 0.8 - 5.3 10e9/L    Absolute Monocytes 0.6 0.0 - 1.3 10e9/L    Absolute Eosinophils 0.0 0.0 - 0.7 10e9/L    Absolute Basophils 0.0 0.0 - 0.2 10e9/L    Abs Immature Granulocytes 0.0 0 - 0.4 10e9/L    Absolute Nucleated RBC 0.0    Comprehensive metabolic panel   Result Value Ref Range    Sodium 142 133 - 144 mmol/L    Potassium 3.0 (L) 3.4 - 5.3 mmol/L    Chloride 112 (H) 94 - 109 mmol/L    Carbon Dioxide 23 20 - 32 mmol/L    Anion Gap 7 3 - 14 mmol/L    Glucose 74 70 - 99 mg/dL    Urea Nitrogen 8 7 - 30 mg/dL    Creatinine 0.68 0.52 - 1.04 mg/dL    GFR Estimate >90 >60 mL/min/[1.73_m2]    GFR Estimate If Black >90 >60 mL/min/[1.73_m2]     Calcium 8.5 8.5 - 10.1 mg/dL    Bilirubin Total 0.4 0.2 - 1.3 mg/dL    Albumin 3.5 3.4 - 5.0 g/dL    Protein Total 6.9 6.8 - 8.8 g/dL    Alkaline Phosphatase 84 40 - 150 U/L    ALT 23 0 - 50 U/L    AST 7 0 - 45 U/L   INR   Result Value Ref Range    INR 1.08 0.86 - 1.14   Lipase   Result Value Ref Range    Lipase 101 73 - 393 U/L   CT Abdomen Pelvis w/o Contrast    Narrative    EXAMINATION: CT ABDOMEN PELVIS W/O CONTRAST, 6/22/2019 7:30 PM    TECHNIQUE:  Helical CT images from the lung bases through the  symphysis pubis were obtained without IV contrast.    COMPARISON: CT abdomen pelvis 6/8/2019    HISTORY: Abd pain, gastroenteritis or colitis suspected; IV contrast  allergy    FINDINGS:    Abdomen and pelvis: Liver is unremarkable. Status post  cholecystectomy. Mild fatty atrophy of the pancreatic parenchyma. The  preliminary adrenal glands are unremarkable. Small nonobstructing  stones in both kidneys, measuring up to 3 mm at the mid pole of the  right kidney. No ureteral stones. Urinary bladder is unremarkable. No  pelvic masses. The small and large bowel are normal in caliber. No  abnormal bowel wall thickening or adjacent stranding. The appendix is  unremarkable. No free fluid in the abdomen or pelvis. No free  intraperitoneal air. No enlarged inguinal or intra-abdominal lymph  nodes.    Lung bases: Heart is normal in size. No pericardial effusion. Mild  bibasilar atelectasis.    Bones and soft tissues: Postsurgical changes of fusion of the left  sacroiliac joint, and posterior approach fusion instrumentation at  L4-5 with interbody spacers. No acute osseous abnormalities or  suspicious osseous lesions.      Impression    IMPRESSION:   1. No acute findings in the abdomen or pelvis.  2. Multiple small nonobstructing stones in both kidneys.     I have personally reviewed the examination and initial interpretation  and I agree with the findings.    LUDY NOLAND MD   UA reflex to Microscopic and Culture    Result Value Ref Range    Color Urine Light Yellow     Appearance Urine Clear     Glucose Urine Negative NEG^Negative mg/dL    Bilirubin Urine Negative NEG^Negative    Ketones Urine Negative NEG^Negative mg/dL    Specific Gravity Urine 1.017 1.003 - 1.035    Blood Urine Negative NEG^Negative    pH Urine 5.5 5.0 - 7.0 pH    Protein Albumin Urine Negative NEG^Negative mg/dL    Urobilinogen mg/dL Normal 0.0 - 2.0 mg/dL    Nitrite Urine Negative NEG^Negative    Leukocyte Esterase Urine Trace (A) NEG^Negative    Source Midstream Urine     RBC Urine 1 0 - 2 /HPF    WBC Urine 4 0 - 5 /HPF    Bacteria Urine Few (A) NEG^Negative /HPF    Squamous Epithelial /HPF Urine 2 (H) 0 - 1 /HPF    Transitional Epi <1 0 - 1 /HPF    Mucous Urine Present (A) NEG^Negative /LPF     *Note: Due to a large number of results and/or encounters for the requested time period, some results have not been displayed. A complete set of results can be found in Results Review.        Procedures            Labs Ordered and Resulted from Time of ED Arrival Up to the Time of Departure from the ED   UA MACROSCOPIC WITH REFLEX TO MICRO AND CULTURE - Abnormal; Notable for the following components:       Result Value    Leukocyte Esterase Urine Trace (*)     Bacteria Urine Few (*)     Squamous Epithelial /HPF Urine 2 (*)     Mucous Urine Present (*)     All other components within normal limits   COMPREHENSIVE METABOLIC PANEL - Abnormal; Notable for the following components:    Potassium 3.0 (*)     Chloride 112 (*)     All other components within normal limits   CBC WITH PLATELETS DIFFERENTIAL   INR   LIPASE   PERIPHERAL IV CATHETER     Assessments & Plan (with Medical Decision Making)     I have reviewed the nursing notes.    Medications   0.9% sodium chloride BOLUS (0 mLs Intravenous Stopped 6/22/19 1951)     Followed by   sodium chloride 0.9% infusion (0 mLs Intravenous Stopped 6/22/19 2115)   lidocaine (PF) (XYLOCAINE) 2 % injection (has no  administration in time range)   HYDROmorphone (PF) (DILAUDID) injection 0.5 mg (0.5 mg Intravenous Given 6/22/19 1853)   HYDROmorphone (PF) (DILAUDID) injection 0.5 mg (0.5 mg Intravenous Given 6/22/19 1721)   ondansetron (ZOFRAN) injection 4 mg (4 mg Intravenous Given 6/22/19 1720)   ondansetron (ZOFRAN) injection 4 mg (4 mg Intravenous Given 6/22/19 1849)   diphenhydrAMINE (BENADRYL) injection 25 mg (25 mg Intravenous Given 6/22/19 1850)   potassium chloride ER (K-DUR/KLOR-CON M) CR tablet 40 mEq (40 mEq Oral Given 6/22/19 2029)     Patient was found to be a little hypokalemic but this was replaced with potassium orally.  The patient's abdominal pain etiology is unclear however the patient's labs and abdominal CT actually look better than it did last time.  Patient was medicated as listed above and at this time will be sent home to follow-up with her primary care physician on Tuesday as she apparently has an appointment at that time.    I have reviewed the findings, diagnosis, plan and need for follow up with the patient.       Medication List      Started    ondansetron 4 MG ODT tab  Commonly known as:  ZOFRAN ODT  4 mg, Oral, EVERY 6 HOURS PRN        Modified    * oxyCODONE 5 MG tablet  Commonly known as:  ROXICODONE  5 mg, Oral, EVERY 6 HOURS PRN  What changed:  You were already taking a medication with the same name, and this prescription was added. Make sure you understand how and when to take each.         Final diagnoses:   Abdominal pain, generalized     Keep hydrated    Please follow up with Your Primary Care Provider on Tuesday as scheduled.    Layton Castro MD    6/22/2019   [facility] [place], EMERGENCY DEPARTMENT     Layton Castro MD  06/22/19 8230

## 2019-06-22 NOTE — TELEPHONE ENCOUNTER
"Patient report abdominal pain is now worse. Pain is 7-8/10, with sharp pain through out her abdomen.  Patient says it got worse after she ate.  Patient reports she threw up the food along with yellow and green liquid.  Reviewed care advice with caller. Patient says she will go to [facility].        Reason for Disposition    [1] Vomiting AND [2] contains bile (green color)    Additional Information    Negative: Shock suspected (e.g., cold/pale/clammy skin, too weak to stand, low BP, rapid pulse)    Negative: Difficult to awaken or acting confused (e.g., disoriented, slurred speech)    Negative: Passed out (i.e., lost consciousness, collapsed and was not responding)    Negative: Sounds like a life-threatening emergency to the triager    Negative: Chest pain    Negative: Pain is mainly in upper abdomen  (if needed ask: \"is it mainly above the belly button?\")    Negative: Followed an abdomen (stomach) injury    Negative: [1] Abdominal pain AND [2] pregnant < 20 weeks    Negative: [1] Abdominal pain AND [2] pregnant > 20 weeks    Negative: [1] Abdominal pain AND [2] postpartum < 1 month since delivery    Negative: [1] SEVERE pain (e.g., excruciating) AND [2] present > 1 hour    Negative: [1] SEVERE pain AND [2] age > 60    Negative: [1] Vomiting AND [2] contains red blood or black (\"coffee ground\") material  (Exception: few red streaks in vomit that only happened once)    Negative: Blood in bowel movements   (Exception: blood on surface of BM with constipation)    Negative: Black or tarry bowel movements  (Exception: chronic-unchanged  black-grey bowel movements AND is taking iron pills or Pepto-bismol)    Negative: Patient sounds very sick or weak to the triager    Negative: [1] MILD-MODERATE pain AND [2] constant AND [3] present > 2 hours    Negative: [1] Vomiting AND [2] abdomen looks much more swollen than usual    Protocols used: ABDOMINAL PAIN - FEMALE-A-AH      "

## 2019-06-22 NOTE — ED TRIAGE NOTES
"abdominal pain/nausea/diarrhea with blood    all over abd pain, \"feels like knifes stabbing me for a month. vomited today, had bright red blood in stool today\"    history of colititus and Krones disease   "

## 2019-06-24 NOTE — TELEPHONE ENCOUNTER
"Subjective     Chief Complaint   Patient presents with   • Abdominal Pain       Daniel Olivia is a 10 y.o. male brought in by mom and GM today with concerns of abd pain.  Ate a hot dog for breakfast, then started having pain/cramping.  Stools normal.  No diarrhea  No vomiting, no fever  No URI symptoms  Stomach pain has resolved now    Hx constipation - well controlled on miralax    Hx developmental delay - previously evaluated by [facility].  Mom and GM state that they were advised to have Daniel retested on occ to see if there have been any changes.  Hx pectus excavatum without any c/o chest pain, SOA, etc.  However, the \"dip seems deeper\" lately, per Mom and GM    Immunization status:  Needs 10 yo vaccines  Immunization History   Administered Date(s) Administered   • DTaP 2008, 2008, 2008, 08/27/2009, 05/13/2010   • DTaP / IPV 07/31/2012   • Hepatitis A 08/27/2009, 05/15/2010   • Hepatitis B 2008, 2008, 2008, 2008   • HiB 2008, 2008, 08/27/2009, 05/13/2010   • IPV 2008, 2008, 2008, 08/27/2009   • MMR 03/09/2009, 07/31/2012   • Pneumococcal Conjugate 13-Valent (PCV13) 2008, 2008, 2008, 03/09/2009   • Rotavirus Monovalent 2008, 2008   • Varicella 03/09/2009, 07/31/2012       Abdominal Pain   This is a new problem. The current episode started today. The onset quality is sudden. The problem has been resolved since onset. The pain is located in the periumbilical region. Quality: unable to describe. The pain does not radiate. Pertinent negatives include no belching, constipation, diarrhea, dysuria, flatus, rash, sore throat or vomiting. Nothing relieves the symptoms. Past treatments include nothing. His past medical history is significant for developmental delay. There is no history of abdominal surgery, irritable bowel syndrome, recent abdominal injury or a UTI.        The following portions of the patient's " Changed RX to Lidoderm 5%, sent new RX. Please send PA as below    Zach Rosario PA-C  [facility]      history were reviewed and updated as appropriate: allergies, current medications, past family history, past medical history, past social history, past surgical history and problem list.    Current Outpatient Medications   Medication Sig Dispense Refill   • loratadine (CLARITIN) 5 MG/5ML syrup Take 10 mL by mouth Daily. 300 mL 3   • montelukast (SINGULAIR) 5 MG chewable tablet Chew 1 tablet Every Night. 30 tablet 5   • ondansetron ODT (ZOFRAN ODT) 4 MG disintegrating tablet Take 1 tablet by mouth Every 8 (Eight) Hours As Needed for Nausea or Vomiting. 15 tablet 1     No current facility-administered medications for this visit.        Allergies   Allergen Reactions   • Amoxicillin Rash   • Cefprozil Rash   • Cephalosporins Rash       Past Medical History:   Diagnosis Date   • Abdominal pain    • Abnormal gait    • Abnormal head movements    • Abrasion of elbow without infection      Left elbow      • Abrasion of head    • Acute bronchitis    • Acute maxillary sinusitis    • Acute pain    • Acute pharyngitis     RST neg      • Acute serous otitis media    • Acute sinusitis    • Acute upper respiratory infection    • Allergic conjunctivitis    • Allergic reaction     to substance   • Allergic rhinitis    • Allergic rhinitis due to pollen    • Allergy to cephalosporin    • Asthma    • Astigmatism     mild hyperopic, not significant      • Common cold    • Constantly crying    • Constipation    • Contusion of chest     Right trunk      • Contusion of face, scalp and neck    • Cough    • Cough    • Decrease in appetite    • Dental caries    • Diarrhea    • Disorder of teeth and supporting structures    • Eczema    • Encounter for eye exam    • Encounter for hearing examination     normal    • Epistaxis    • Eruption due to drug    • Exanthematous disorder     resolved      • Excessive cerumen in ear canal    • Fever     Likely viral. Now resolved   • GERD (gastroesophageal reflux disease)    • Global developmental delay   "  • Heartburn    • Hip pain    • Hordeolum    • Hydrocephalus     Mild, stable on CT scan on 3/26/16      • Hypermetropia     mild    • Impacted cerumen of right ear    • Influenza    • Mixed development disorder    • Nasal congestion    • Nausea    • Nocturnal dyspnea    • Other lack of expected normal physiological development in childhood    • Otitis media    • Pain in throat      Rapid strep test negative      • Pain in wrist    • Post-viral cough syndrome    • Postnasal drip     Likely secondary to ALLERGIC rhinitis      • Purulent rhinitis    • Respiratory syncytial virus infection     recheck      • Seasonal allergic rhinitis    • Skin eruption     diffuse red rash      • Streptococcal sore throat     rapid strep test positive      • Tick bite    • Upper respiratory infection    • Viral syndrome    • Viral upper respiratory tract infection    • Visual disturbance    • Wheezing    • Worried well        Review of Systems   Constitutional: Negative.    HENT: Negative.  Negative for sore throat.    Eyes: Negative.    Respiratory: Negative.  Negative for apnea, cough, chest tightness and shortness of breath.    Cardiovascular: Negative.  Negative for chest pain and palpitations.   Gastrointestinal: Positive for abdominal pain. Negative for constipation, diarrhea, flatus and vomiting.   Endocrine: Negative.    Genitourinary: Negative.  Negative for dysuria.   Musculoskeletal: Negative.    Skin: Negative.  Negative for rash.   Neurological: Negative.    Hematological: Negative.          Objective     Temp 98 °F (36.7 °C)   Ht 161.3 cm (63.5\")   Wt 42.7 kg (94 lb 2 oz)   BMI 16.41 kg/m²     Physical Exam   Constitutional: He appears well-developed and well-nourished. He is active. No distress.   HENT:   Right Ear: Tympanic membrane normal.   Left Ear: Tympanic membrane normal.   Nose: Nose normal.   Mouth/Throat: Mucous membranes are moist. Oropharynx is clear.   Eyes: Conjunctivae and EOM are normal. Pupils are " equal, round, and reactive to light.   Neck: Normal range of motion.   Cardiovascular: Normal rate and regular rhythm.   Pulmonary/Chest: Effort normal and breath sounds normal. He exhibits deformity.   Abdominal: Soft. Bowel sounds are normal. He exhibits no distension. There is no tenderness.   Musculoskeletal: Normal range of motion.   Neurological: He is alert.   Skin: Skin is warm. Capillary refill takes less than 2 seconds.   Nursing note and vitals reviewed.        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      Pectus excavatum - chest xray for screening.  Discussed typical evolution, particularly during growth spurts.  Will continue to monitor and refer as needed  Mild mental retardation - Mom and GM would like rescreening done.  Ref to [facility], where he was previously screening.  Made aware that this referral often comes with a lengthy wait list.  Abd pain, resolved now.  Zofran called to pharmacy, in case he were to start vomiting.  Sips of liquids, diet as tolerated.  Reviewed s/s needing further investigation, including those for which to present to ER.    Return if symptoms worsen or fail to improve.

## 2019-06-24 NOTE — PROGRESS NOTES
"Subjective     Katy Islas is a 29 year old female who presents to clinic today for the following health issues:    HPI   ED/UC Followup:    Facility:  [facility]   Date of visit: 6/22/19   Reason for visit: Abdominal pain   Current Status: potassium is low-would like this rechecked. She is sweating a lot and not feeling great     - Will call GI doctor today  - Compazine taking, helps some, did have blood in vomit today     - Went to counseling yesterday, went well      Psychiatry appointment will be scheduled soon      Abilify, Cymbalta, Buspar, Minipress     - Katy needs more valium at 10mg: she is off of the klonopin      Last filled April      Couldn't sleep last night due to pain      Back doctor tomorrow     - High blood pressure      Anxious? Pain?      Worried about this      Was on Lopressor in the past      Dad had HTN          Reviewed and updated as needed this visit by Provider  Allergies  Meds  Problems  Med Hx  Surg Hx         Review of Systems   ROS COMP: Constitutional, HEENT, cardiovascular, pulmonary, gi and gu systems are negative, except as otherwise noted.      Objective    /90   Pulse 90   Temp 98.2  F (36.8  C) (Temporal)   Ht 1.575 m (5' 2\")   Wt 80.6 kg (177 lb 9.6 oz)   SpO2 97%   BMI 32.48 kg/m    Body mass index is 32.48 kg/m .  Physical Exam   GENERAL APPEARANCE: healthy, alert and no distress  EYES: Eyes grossly normal to inspection, PERRLA, conjunctivae and sclerae without injection or discharge, EOM intact   RESP: Lungs clear to auscultation - no rales, rhonchi or wheezes   CV: Regular rates and rhythm, normal S1 S2, no S3 or S4, no murmur, click or rub, no peripheral edema and peripheral pulses strong and symmetric bilaterally   ABDOMEN: Soft, diffusely tender with no rebound or guarding, no hepatosplenomegaly, no masses and bowel sounds normal   MS: No musculoskeletal defects are noted and gait is age appropriate without ataxia   SKIN: No suspicious lesions " or rashes, hydration status appears adeuqate with normal skin turgor   PSYCH: Alert and oriented x3; speech- coherent , normal rate and volume; able to articulate logical thoughts, able to abstract reason, no tangential thoughts, no hallucinations or delusions, mentation appears normal, Mood is euthymic. Affect is appropriate for this mood state and bright. Thought content is free of suicidal ideation, hallucinations, and delusions. Dress is adequate and upkept. Eye contact is good during conversation.       Diagnostic Test Results:  Labs reviewed in Epic        Assessment & Plan       ICD-10-CM    1. Crohn's disease of colon with other complication (H) K50.118 lidocaine HCl (XYLOCAINE) 2 % 15 mL, alum & mag hydroxide-simethicone (MYLANTA ES/MAALOX  ES) 15 mL GI Cocktail     **Potassium FUTURE anytime   2. Gastroesophageal reflux disease without esophagitis K21.9 lidocaine HCl (XYLOCAINE) 2 % 15 mL, alum & mag hydroxide-simethicone (MYLANTA ES/MAALOX  ES) 15 mL GI Cocktail     **Potassium FUTURE anytime   3. Mild major depression (H) F32.0 busPIRone (BUSPAR) 15 MG tablet   4. Bipolar disorder, current episode mixed, severe, without psychotic features (H) F31.63 busPIRone (BUSPAR) 15 MG tablet   5. Hypertension, unspecified type I10 losartan (COZAAR) 25 MG tablet   6. Chronic bilateral low back pain with left-sided sciatica M54.42 methocarbamol (ROBAXIN) 750 MG tablet    G89.29      1. & 2. GI Issues   - Patient with known Chron's and GERD   - Following now with [facility] in [place]   - Has colonoscopy set up   - Taking Compazine   - Recommend call them to discuss continuing symptoms   - Potassium low in ED, likely due for diarrhea and vomiting  - Recommend recheck in 1 week     3 & 4. Mood   - Patient with bipolar personality and disorder, depression   - Seeing deaf counselor Vikki in [place], referred to deaf psychiatrist but this has not been set up yet   - Feels things are stable on Abilify, Cymbalta, Buspar,  Minipress, refilled as needed   - Encouraged psychiatry     5. HTN  - Elevated for several months now, reviewed labs, has had fairly throughout work up for this including CMP/BMP/TSH, etc.      Reports previously on Lopressor for this issue  - Will start Losartan 25 mg, reviewed use and side effects   - Recheck at follow up appointment     6. Back pain   - Has follow up with me for pain medications 7/11  - Has follow up with neurosurgery tomorrow   - Going to physical therapy   - Wishing for something different for spasms   - Reviewed allergy list -has almost all muscle relaxers on there except Flexeril and Valium, reporting Flexeril not working and wants Valium  - Discussed I am not willing to put her on this due to interactions and risks when also on pain medications   - Reviewed list with her, willing to try Robaxin again (lists swelling as side effect, patient states doesn't think that is true)   - Should stop Flexeril as not effective   - Continue with plan to see neurosurgery       The patient indicates understanding of these issues and agrees with the plan.    Due to language barrier, an  was present during the history-taking and subsequent discussion (and for part of the physical exam) with this patient.    Return in about 1 week (around 7/2/2019) for Lab Work (Lab Only).      Aura Rosario PA-C  [facility]

## 2019-06-25 ENCOUNTER — MYC MEDICAL ADVICE (OUTPATIENT)
Dept: FAMILY MEDICINE | Facility: OTHER | Age: 29
End: 2019-06-25

## 2019-06-25 ENCOUNTER — PATIENT OUTREACH (OUTPATIENT)
Dept: CARE COORDINATION | Facility: CLINIC | Age: 29
End: 2019-06-25

## 2019-06-25 ENCOUNTER — OFFICE VISIT (OUTPATIENT)
Dept: FAMILY MEDICINE | Facility: OTHER | Age: 29
End: 2019-06-25
Payer: MEDICARE

## 2019-06-25 VITALS
WEIGHT: 177.6 LBS | HEART RATE: 90 BPM | HEIGHT: 62 IN | OXYGEN SATURATION: 97 % | TEMPERATURE: 98.2 F | SYSTOLIC BLOOD PRESSURE: 130 MMHG | DIASTOLIC BLOOD PRESSURE: 90 MMHG | BODY MASS INDEX: 32.68 KG/M2

## 2019-06-25 DIAGNOSIS — K50.118 CROHN'S DISEASE OF COLON WITH OTHER COMPLICATION (H): Primary | ICD-10-CM

## 2019-06-25 DIAGNOSIS — F32.0 MILD MAJOR DEPRESSION (H): ICD-10-CM

## 2019-06-25 DIAGNOSIS — K21.9 GASTROESOPHAGEAL REFLUX DISEASE WITHOUT ESOPHAGITIS: ICD-10-CM

## 2019-06-25 DIAGNOSIS — M54.42 CHRONIC BILATERAL LOW BACK PAIN WITH LEFT-SIDED SCIATICA: ICD-10-CM

## 2019-06-25 DIAGNOSIS — G89.29 CHRONIC BILATERAL LOW BACK PAIN WITH LEFT-SIDED SCIATICA: ICD-10-CM

## 2019-06-25 DIAGNOSIS — F31.63 BIPOLAR DISORDER, CURRENT EPISODE MIXED, SEVERE, WITHOUT PSYCHOTIC FEATURES (H): ICD-10-CM

## 2019-06-25 DIAGNOSIS — I10 HYPERTENSION, UNSPECIFIED TYPE: ICD-10-CM

## 2019-06-25 PROCEDURE — 99214 OFFICE O/P EST MOD 30 MIN: CPT | Performed by: PHYSICIAN ASSISTANT

## 2019-06-25 RX ORDER — METHOCARBAMOL 750 MG/1
750 TABLET, FILM COATED ORAL 4 TIMES DAILY PRN
Qty: 60 TABLET | Refills: 1 | Status: SHIPPED | OUTPATIENT
Start: 2019-06-25 | End: 2019-08-05

## 2019-06-25 RX ORDER — BUSPIRONE HYDROCHLORIDE 15 MG/1
15 TABLET ORAL 2 TIMES DAILY
Qty: 60 TABLET | Refills: 5 | Status: SHIPPED | OUTPATIENT
Start: 2019-06-25 | End: 2020-01-13

## 2019-06-25 RX ORDER — LOSARTAN POTASSIUM 25 MG/1
25 TABLET ORAL DAILY
Qty: 30 TABLET | Refills: 1 | Status: SHIPPED | OUTPATIENT
Start: 2019-06-25 | End: 2019-07-08

## 2019-06-25 ASSESSMENT — MIFFLIN-ST. JEOR: SCORE: 1478.84

## 2019-06-25 ASSESSMENT — ACTIVITIES OF DAILY LIVING (ADL): DEPENDENT_IADLS:: INDEPENDENT

## 2019-06-25 ASSESSMENT — PAIN SCALES - GENERAL: PAINLEVEL: MODERATE PAIN (5)

## 2019-06-25 NOTE — TELEPHONE ENCOUNTER
Patient is scheduled for tomorrow.    Next 5 appointments (look out 90 days)    Jun 26, 2019  1:30 PM CDT  Return Visit with Shazia Chadwick NP, Dayana Roldan  [facility] ([facility]) [address] [place] [id number]  [phone]   Jul 02, 2019  9:30 AM CDT  Nurse Only with NL JERODAT TEAM B, [facility]  [facility] [address] [address]  [phone]   Jul 11, 2019  9:00 AM CDT  Office Visit with Aura Rosario PA-C  [facility] [address] [address]  [phone]        Anisha Beebe MA

## 2019-06-25 NOTE — PATIENT INSTRUCTIONS
1.  Potassium recheck lab only appointment in 1 week and BP check     2. Start Losartan once a day     3. Call GI doctor to discuss new symptoms     4. Trial of Robaxin for muscle spasms

## 2019-06-25 NOTE — PROGRESS NOTES
Clinic Care Coordination Contact    Situation: Patient chart reviewed by care coordinator.    Background: CHRISTIANNE PALENCIA had spoke with pt and given some financial resources for help with bills and other disability resources. Attempted to reach pt to see if had further questions, did not reach via phone.     Assessment: Pt did read [employer] message sent. CHRISTIANNE PALENCIA notes she was in ED x2 since due to abdominal pain and colitis. Pt did have CT scan done. Has been seeing GI specialist. Pt has colonoscopy scheduled for mid July and saw PCP today. CHRISTIANNE CC notes pt has Bipolar disorder, is on medication and PCP notes indicate pt has a counselor and has a psychiatry appointment coming up.     Plan/Recommendations: CHRISTIANNE PALENCIA has not heard from pt since has read [employer] message almost 3 weeks ago. RN CC spoke with pt and she has f/u appointments scheduled and planning to attend. Pt declined any CC needs to RN CC. CHRISTIANNE PALENCIA will do no further outreaches and will close to CC.     DOYLE Torres   Primary Care Clinic- Social Work Care Coordinator  [facility]  6/25/2019 12:51 PM  [phone]

## 2019-06-26 ENCOUNTER — OFFICE VISIT (OUTPATIENT)
Dept: NEUROSURGERY | Facility: CLINIC | Age: 29
End: 2019-06-26
Attending: PHYSICIAN ASSISTANT
Payer: MEDICARE

## 2019-06-26 VITALS
TEMPERATURE: 98.7 F | HEIGHT: 62 IN | SYSTOLIC BLOOD PRESSURE: 124 MMHG | WEIGHT: 179 LBS | OXYGEN SATURATION: 96 % | HEART RATE: 110 BPM | DIASTOLIC BLOOD PRESSURE: 89 MMHG | BODY MASS INDEX: 32.94 KG/M2

## 2019-06-26 DIAGNOSIS — Z98.1 S/P LUMBAR FUSION: Primary | ICD-10-CM

## 2019-06-26 PROCEDURE — 99213 OFFICE O/P EST LOW 20 MIN: CPT | Performed by: PHYSICIAN ASSISTANT

## 2019-06-26 PROCEDURE — T1013 SIGN LANG/ORAL INTERPRETER: HCPCS | Mod: U3

## 2019-06-26 PROCEDURE — G0463 HOSPITAL OUTPT CLINIC VISIT: HCPCS

## 2019-06-26 ASSESSMENT — MIFFLIN-ST. JEOR: SCORE: 1485.19

## 2019-06-26 ASSESSMENT — PAIN SCALES - GENERAL: PAINLEVEL: SEVERE PAIN (6)

## 2019-06-26 NOTE — PROGRESS NOTES
[facility]  Neurosurgery followup:    HPI: Katy Islas is a 29 year old female with a history of low back pain. 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 Jeniffer Eugene on 2/23/2017 after L4-5 discectomy prior on 10/19/2011 with Dr. Sorensen with [facility]. She has also underwent a left SI joint fusion with Dr. Eugene 10/26/2018. Today she presents with 6+ months of worsening low back pain that radiates down the left lateral thigh to the foot. She notes left great toe tingling. She denies right-sided leg pain. She denies overt weakness, bowel/bladder incontinence and foot drop. She states sitting aggravates the pain. Lying in a recliner alleviates the pain. She recently started physical therapy is unsure if she has seen benefit yet. She has not had any recent low back injections.      Returns today for follow up of low back pain and to discuss imaging results. States she has continued left low back pain radiating into buttocks, left lateral leg and occassionally into groin. She does also get intermittent right leg pain, but her left is the worst. She is interested in pool therapy. She is planning to schedule appointment to establish with CPM.  Exam:  Constitutional:  Alert, well nourished, NAD.  HEENT: Normocephalic, atraumatic.   Pulm:  Without shortness of breath   CV:  No pitting edema of BLE.      Neurological:  Awake  Alert  Oriented x 3  Motor exam:        IP Q DF PF EHL  R   5  5   5   5    5  L   5  5   5   5    5     Reflexes are 2+ in the patellar and Achilles. There is no clonus. Downgoing Babinski.    Able to spontaneously move L/E bilaterally  Sensation intact throughout all L/E dermatomes    Imaging: Reviewed thoracic and lumbar CT myelogram and lumbar MRI from 6/17 in office today. Fusion appears stable without any signs of cord compression or nerve impingement.   A/P: Returns today for follow up of low back pain and to discuss imaging results. States she has  continued left low back pain radiating into buttocks, left lateral leg and occassionally into groin. She does also get intermittent right leg pain, but her left is the worst.  Imaging reviewed in office today. No weakness on exam. Will have her continue to schedule with CPM as planned. I did also discuss pool therapy, but she will wait until she discussed with CPM. Follow up as needed. Patient voiced understanding and agreement.         Madeline Lieberman PA-C  [facility]  [address] [address]    Tel [phone]  Pager [phone]

## 2019-06-26 NOTE — NURSING NOTE
"Katy Islas is a 29 year old female who presents for:  Chief Complaint   Patient presents with     Neck Pain     Follow up for chronic pain syndrome, bulge of cervical disc without myelopathy, chronic bilateral low back pain with left sided sciatica.         Initial Vitals:  /89 (BP Location: Right arm, Patient Position: Sitting, Cuff Size: Adult Large)   Pulse 110   Temp 98.7  F (37.1  C) (Oral)   Ht 5' 2\" (1.575 m)   Wt 179 lb (81.2 kg)   SpO2 96%   BMI 32.74 kg/m   Estimated body mass index is 32.74 kg/m  as calculated from the following:    Height as of this encounter: 5' 2\" (1.575 m).    Weight as of this encounter: 179 lb (81.2 kg).. Body surface area is 1.88 meters squared. BP completed using cuff size: large  Severe Pain (6)        Nursing Comments: Pain level of a 6 out of 10 today.         Atiya Prabhakar    "

## 2019-06-26 NOTE — LETTER
6/26/2019         RE: Katy Islas  [address]  [place] [address]        Dear Colleague,    Thank you for referring your patient, Katy Islas, to the [facility]. Please see a copy of my visit note below.    [facility]  Neurosurgery followup:    HPI: Katy Islas is a 29 year old female with a history of low back pain. 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 Jeniffer Eugene on 2/23/2017 after L4-5 discectomy prior on 10/19/2011 with Dr. Sorensen with [facility]. She has also underwent a left SI joint fusion with Dr. Eugene 10/26/2018. Today she presents with 6+ months of worsening low back pain that radiates down the left lateral thigh to the foot. She notes left great toe tingling. She denies right-sided leg pain. She denies overt weakness, bowel/bladder incontinence and foot drop. She states sitting aggravates the pain. Lying in a recliner alleviates the pain. She recently started physical therapy is unsure if she has seen benefit yet. She has not had any recent low back injections.      Returns today for follow up of low back pain and to discuss imaging results. States she has continued left low back pain radiating into buttocks, left lateral leg and occassionally into groin. She does also get intermittent right leg pain, but her left is the worst. She is interested in pool therapy. She is planning to schedule appointment to establish with CPM.  Exam:  Constitutional:  Alert, well nourished, NAD.  HEENT: Normocephalic, atraumatic.   Pulm:  Without shortness of breath   CV:  No pitting edema of BLE.      Neurological:  Awake  Alert  Oriented x 3  Motor exam:        IP Q DF PF EHL  R   5  5   5   5    5  L   5  5   5   5    5     Reflexes are 2+ in the patellar and Achilles. There is no clonus. Downgoing Babinski.    Able to spontaneously move L/E bilaterally  Sensation intact throughout all L/E dermatomes    Imaging:  Reviewed thoracic and lumbar CT myelogram and lumbar MRI from 6/17 in office today. Fusion appears stable without any signs of cord compression or nerve impingement.   A/P: Returns today for follow up of low back pain and to discuss imaging results. States she has continued left low back pain radiating into buttocks, left lateral leg and occassionally into groin. She does also get intermittent right leg pain, but her left is the worst.  Imaging reviewed in office today. No weakness on exam. Will have her continue to schedule with CPM as planned. I did also discuss pool therapy, but she will wait until she discussed with CPM. Follow up as needed. Patient voiced understanding and agreement.         Madeline Lieberman PA-C  [facility]  [address] [address]    Tel [phone]  Pager [phone]      Again, thank you for allowing me to participate in the care of your patient.        Sincerely,        Madeline Lieberman PA-C

## 2019-06-26 NOTE — PATIENT INSTRUCTIONS
Continue follow up with [facility] and primary care provider    Follow up as needed

## 2019-06-27 ENCOUNTER — MYC MEDICAL ADVICE (OUTPATIENT)
Dept: FAMILY MEDICINE | Facility: OTHER | Age: 29
End: 2019-06-27

## 2019-06-27 ENCOUNTER — TELEPHONE (OUTPATIENT)
Dept: FAMILY MEDICINE | Facility: OTHER | Age: 29
End: 2019-06-27

## 2019-06-27 NOTE — TELEPHONE ENCOUNTER
Reason for Call: Request for an order or referral:    Order or referral being requested: referral for [facility].    Date needed: as soon as possible/today    Has the patient been seen by the PCP for this problem? YES    Additional comments: The insurance coming does not need the referral but if the referral goes thru then the pain management office will get all her records for her if no referral she will have to do the leg work getting her own records.  Please fax to [phone],  Phone [phone]    Phone number Patient can be reached at:  Home number on file [phone] (home)    Best Time:  anytime    Can we leave a detailed message on this number?  YES    Call taken on 6/27/2019 at 1:41 PM by Nikhil Barton

## 2019-06-28 ENCOUNTER — TELEPHONE (OUTPATIENT)
Dept: FAMILY MEDICINE | Facility: OTHER | Age: 29
End: 2019-06-28

## 2019-06-28 NOTE — TELEPHONE ENCOUNTER
I recommend she stay well hydrated, take deep breaths and if symptoms continue, contact the clinic next week or be seen. If symptoms worsen, can stop the Losartan.     Bob Monson PA-C

## 2019-06-28 NOTE — TELEPHONE ENCOUNTER
Fax from [facility].  They have Rx for  Methocarbamol.    Drug is not covered by plan.  Covered alteratives are Tizanidine or Cyclobenzaprine.  They are wondering if you could send over a new Rx?

## 2019-06-28 NOTE — TELEPHONE ENCOUNTER
I spoke with patient and relayed the information. Patient agrees with plan to stay hydrated and take deep breaths. Informed patient of 24/7 nurse call line that can be utilized with any other questions or concerns.     Next 5 appointments (look out 90 days)    Jul 02, 2019  9:30 AM CDT  Nurse Only with STEVO GONZALEZ TEAM B, [facility] ([facility]) [address] [place] [address]  [phone]   Jul 11, 2019  9:00 AM CDT  Office Visit with Aura Rosario PA-C  [facility] ([facility]) [address] [place] [address]  [phone]        Marti Plascencia, RN, BSN

## 2019-06-29 ENCOUNTER — NURSE TRIAGE (OUTPATIENT)
Dept: NURSING | Facility: CLINIC | Age: 29
End: 2019-06-29

## 2019-06-29 ENCOUNTER — HOSPITAL ENCOUNTER (EMERGENCY)
Facility: CLINIC | Age: 29
Discharge: HOME | End: 2019-06-29
Attending: FAMILY MEDICINE | Admitting: FAMILY MEDICINE
Payer: MEDICARE

## 2019-06-29 VITALS
DIASTOLIC BLOOD PRESSURE: 75 MMHG | TEMPERATURE: 98.5 F | SYSTOLIC BLOOD PRESSURE: 108 MMHG | BODY MASS INDEX: 33.11 KG/M2 | OXYGEN SATURATION: 98 % | WEIGHT: 181 LBS | RESPIRATION RATE: 16 BRPM

## 2019-06-29 DIAGNOSIS — R10.9 CHRONIC ABDOMINAL PAIN: ICD-10-CM

## 2019-06-29 DIAGNOSIS — G43.A0 NON-INTRACTABLE CYCLICAL VOMITING, PRESENCE OF NAUSEA NOT SPECIFIED: ICD-10-CM

## 2019-06-29 DIAGNOSIS — G89.29 CHRONIC ABDOMINAL PAIN: ICD-10-CM

## 2019-06-29 LAB
ALBUMIN SERPL-MCNC: 4 G/DL (ref 3.4–5)
ALP SERPL-CCNC: 110 U/L (ref 40–150)
ALT SERPL W P-5'-P-CCNC: 23 U/L (ref 0–50)
ANION GAP SERPL CALCULATED.3IONS-SCNC: 7 MMOL/L (ref 3–14)
AST SERPL W P-5'-P-CCNC: 15 U/L (ref 0–45)
BASOPHILS # BLD AUTO: 0 10E9/L (ref 0–0.2)
BASOPHILS NFR BLD AUTO: 0.3 %
BILIRUB SERPL-MCNC: 0.5 MG/DL (ref 0.2–1.3)
BUN SERPL-MCNC: 15 MG/DL (ref 7–30)
CALCIUM SERPL-MCNC: 9.6 MG/DL (ref 8.5–10.1)
CHLORIDE SERPL-SCNC: 104 MMOL/L (ref 94–109)
CO2 SERPL-SCNC: 30 MMOL/L (ref 20–32)
CREAT SERPL-MCNC: 0.94 MG/DL (ref 0.52–1.04)
DIFFERENTIAL METHOD BLD: ABNORMAL
EOSINOPHIL NFR BLD AUTO: 1.3 %
ERYTHROCYTE [DISTWIDTH] IN BLOOD BY AUTOMATED COUNT: 12.4 % (ref 10–15)
GFR SERPL CREATININE-BSD FRML MDRD: 81 ML/MIN/{1.73_M2}
GLUCOSE SERPL-MCNC: 100 MG/DL (ref 70–99)
HCT VFR BLD AUTO: 41.4 % (ref 35–47)
HGB BLD-MCNC: 14.2 G/DL (ref 11.7–15.7)
IMM GRANULOCYTES # BLD: 0 10E9/L (ref 0–0.4)
IMM GRANULOCYTES NFR BLD: 0.3 %
LIPASE SERPL-CCNC: 70 U/L (ref 73–393)
LYMPHOCYTES # BLD AUTO: 1.7 10E9/L (ref 0.8–5.3)
LYMPHOCYTES NFR BLD AUTO: 28.2 %
MCH RBC QN AUTO: 33.1 PG (ref 26.5–33)
MCHC RBC AUTO-ENTMCNC: 34.3 G/DL (ref 31.5–36.5)
MCV RBC AUTO: 97 FL (ref 78–100)
MONOCYTES # BLD AUTO: 0.7 10E9/L (ref 0–1.3)
MONOCYTES NFR BLD AUTO: 10.8 %
NEUTROPHILS # BLD AUTO: 3.6 10E9/L (ref 1.6–8.3)
NEUTROPHILS NFR BLD AUTO: 59.1 %
NRBC # BLD AUTO: 0 10*3/UL
NRBC BLD AUTO-RTO: 0 /100
PLATELET # BLD AUTO: 297 10E9/L (ref 150–450)
POTASSIUM SERPL-SCNC: 3.9 MMOL/L (ref 3.4–5.3)
PROT SERPL-MCNC: 7.5 G/DL (ref 6.8–8.8)
RBC # BLD AUTO: 4.29 10E12/L (ref 3.8–5.2)
SODIUM SERPL-SCNC: 141 MMOL/L (ref 133–144)
WBC # BLD AUTO: 6.1 10E9/L (ref 4–11)

## 2019-06-29 PROCEDURE — 99284 EMERGENCY DEPT VISIT MOD MDM: CPT | Mod: Z6 | Performed by: FAMILY MEDICINE

## 2019-06-29 PROCEDURE — 96374 THER/PROPH/DIAG INJ IV PUSH: CPT | Performed by: FAMILY MEDICINE

## 2019-06-29 PROCEDURE — 85025 COMPLETE CBC W/AUTO DIFF WBC: CPT | Performed by: FAMILY MEDICINE

## 2019-06-29 PROCEDURE — 99284 EMERGENCY DEPT VISIT MOD MDM: CPT | Mod: 25 | Performed by: FAMILY MEDICINE

## 2019-06-29 PROCEDURE — 96375 TX/PRO/DX INJ NEW DRUG ADDON: CPT | Performed by: FAMILY MEDICINE

## 2019-06-29 PROCEDURE — 96361 HYDRATE IV INFUSION ADD-ON: CPT | Performed by: FAMILY MEDICINE

## 2019-06-29 PROCEDURE — 25000128 H RX IP 250 OP 636: Performed by: FAMILY MEDICINE

## 2019-06-29 PROCEDURE — 80053 COMPREHEN METABOLIC PANEL: CPT | Performed by: FAMILY MEDICINE

## 2019-06-29 PROCEDURE — 83690 ASSAY OF LIPASE: CPT | Performed by: FAMILY MEDICINE

## 2019-06-29 PROCEDURE — 25800030 ZZH RX IP 258 OP 636: Performed by: FAMILY MEDICINE

## 2019-06-29 RX ORDER — DIPHENHYDRAMINE HYDROCHLORIDE 50 MG/ML
50 INJECTION INTRAMUSCULAR; INTRAVENOUS ONCE
Status: COMPLETED | OUTPATIENT
Start: 2019-06-29 | End: 2019-06-29

## 2019-06-29 RX ORDER — METHYLPREDNISOLONE 4 MG
TABLET, DOSE PACK ORAL
Qty: 21 TABLET | Refills: 0 | Status: SHIPPED | OUTPATIENT
Start: 2019-06-29 | End: 2019-07-08

## 2019-06-29 RX ORDER — PROCHLORPERAZINE MALEATE 10 MG
10 TABLET ORAL EVERY 6 HOURS PRN
Qty: 20 TABLET | Refills: 0 | Status: SHIPPED | OUTPATIENT
Start: 2019-06-29 | End: 2019-10-15

## 2019-06-29 RX ORDER — SODIUM CHLORIDE 9 MG/ML
1000 INJECTION, SOLUTION INTRAVENOUS CONTINUOUS
Status: DISCONTINUED | OUTPATIENT
Start: 2019-06-29 | End: 2019-06-29 | Stop reason: HOSPADM

## 2019-06-29 RX ADMIN — DIPHENHYDRAMINE HYDROCHLORIDE 50 MG: 50 INJECTION, SOLUTION INTRAMUSCULAR; INTRAVENOUS at 18:07

## 2019-06-29 RX ADMIN — SODIUM CHLORIDE 1000 ML: 9 INJECTION, SOLUTION INTRAVENOUS at 18:06

## 2019-06-29 RX ADMIN — PROMETHAZINE HYDROCHLORIDE 25 MG: 25 INJECTION INTRAMUSCULAR; INTRAVENOUS at 18:10

## 2019-06-29 NOTE — ED NOTES
Patients IV was beeping occlusion, positional, I placed a pillow under her arm, patient requesting through  to have IV stopped. Patient threw hearing aid on floor,  being used, patient covering eyes and will not look at Ipad.   Patient requesting not to touch IV, requested through  if I could discconnect the IV. Patient refusing to look at me or .  Told  she wants the IV removed.

## 2019-06-29 NOTE — ED AVS SNAPSHOT
[facility] Emergency Department  [address] [place] [address]  Phone:  [phone]  Fax:  [phone]                                    Katy Islas   MRN: [id number]    Department:  [facility] Emergency Department   Date of Visit:  6/29/2019           After Visit Summary Signature Page    I have received my discharge instructions, and my questions have been answered. I have discussed any challenges I see with this plan with the nurse or doctor.    ..........................................................................................................................................  Patient/Patient Representative Signature      ..........................................................................................................................................  Patient Representative Print Name and Relationship to Patient    ..................................................               ................................................  Date                                   Time    ..........................................................................................................................................  Reviewed by Signature/Title    ...................................................              ..............................................  Date                                               Time          22EPIC Rev 08/18

## 2019-06-29 NOTE — TELEPHONE ENCOUNTER
"Katy started vomiting one hour ago and so far has vomited x4.  Katy took zofran and after taking vomited it up and slight blood in last vomit.  Katy has noticed green bile in vomit and has only urinated 1 time today around 11:00am.      Reason for Disposition    [1] Vomiting AND [2] contains bile (green color)    Additional Information    Negative: Shock suspected (e.g., cold/pale/clammy skin, too weak to stand, low BP, rapid pulse)    Negative: Difficult to awaken or acting confused (e.g., disoriented, slurred speech)    Negative: Sounds like a life-threatening emergency to the triager    Negative: [1] Vomiting AND [2] contains red blood or black (\"coffee ground\") material  (Exception: few red streaks in vomit that only happened once)    Negative: Severe pain in one eye    Negative: Recent head injury (within last 3 days)    Negative: Recent abdominal injury (within last 3 days)    Negative: [1] Insulin-dependent diabetes (Type I) AND [2] glucose > 400 mg/dl (22 mmol/l)    Negative: [1] SEVERE vomiting (e.g., 6 or more times/day) AND [2] present > 8 hours    Negative: [1] MODERATE vomiting (e.g., 3 - 5 times/day) AND [2] age > 60    Negative: Severe headache (e.g., excruciating) (Exception: similar to previous migraines)    Negative: High-risk adult (e.g., diabetes mellitus, brain tumor, V-P shunt, hernia)    Negative: [1] Drinking very little AND [2] dehydration suspected (e.g., no urine > 12 hours, very dry mouth, very lightheaded)    Negative: Patient sounds very sick or weak to the triager    Negative: [1] Vomiting AND [2] abdomen looks much more swollen than usual    Protocols used: VOMITING-A-AH      "

## 2019-06-29 NOTE — ED PROVIDER NOTES
History     Chief Complaint   Patient presents with     Vomiting     Abdominal Pain     HPI   Patient is deaf and refused an  but I insisted.    Katy Islas is a 29 year old female who presents back to the emergency department with recurrence of abdominal pain and vomiting blood.  Patient has significant past medical history for hearing loss, Crohn's colitis, generalized anxiety disorder, major depression, chronic pain, tobacco use disorder, chronic pain contract was sent in by triage nurse after calling with above symptoms.  Patient states she is throwing up 3-5 times today.  She has had no diarrhea.  The pain is constant.  Denies any fevers or chills.        Summary of recent visits just since the end of feburary to the ED, this excludes clinic visit with pcp or other specialist:  2/27/19: [place] emergency department: Normal labs, pelvic exam, IV fluids, IV emetics and IV fentanyl and discharged home  3/11/2019: [place] emergency department: Normal labs, IV fluids, IV Phenergan and discharged home  3/24/2019: [place] emergency department: Visit for chest pain, discharged home  4/2/2019: [place] emergency department: Visit for chest pain, discharged home  4/6/2019: [place] emergency department: Normal labs, IV fluids, IV Phenergan and left AMA  4/8/2019: [place] emergency department: Normal labs, IV Protonix discharged home  4/12/2019: Endoscopy done and was normal  4/14/2019: [place] emergency department: Visit for migraine, discharged home  4/19/2019: [place] emergency department: Unable to establish an IV and patient left AMA  4/22/2019: [facility] emergency department: Visit for neck pain  5/5/2019: [facility] emergency department: Visit for cough and shortness of breath, discharged home  5/6/2019: [facility] emergency department: Visit for viral URI, discharged home  5/7/2019: [facility] emergency department: Visit for abdominal  pain and vomiting: Normal labs, CT scan done which was normal, given Dilaudid, Compazine, Zofran and Benadryl, discharged home driving her own car after lying to staff.  5/11/2019: [place] emergency department visit: Visit for chest pain, discharged home  5/25/19:  [facility] emergency department visit: Visit for vomiting blood and abdominal pain, normal labs, given Benadryl, GI cocktail and Zofran, discharged home  5/25/2019:  [place] emergency department visit: Visit for abdominal pain and vomiting blood, patient had just left the [place] emergency department and went there and they discharged her home after giving IM Phenergan and Benadryl.  6/8/2019: [facility] emergency department visit: Visit for abdominal pain, Normal labs, CT scan without contrast was normal, given Solu-Medrol, Benadryl and Zofran and discharged home  6/20/2019: [facility] emergency department visit: Visit for abdominal pain and vomiting blood,Given intramuscular Dilaudid and sent home on a Medrol Dosepak  6/22/2019: [facility] emergency department visit: Visit for abdominal pain and vomiting blood, another CT scan done which was normal, normal labs except for a mildly low potassium, given IV fluids, IV Dilaudid, Benadryl and Zofran and discharged home        Allergies:  Allergies   Allergen Reactions     Iohexol Hives     Other reaction(s): Hives  IV contrast; patient states she tolerates contrast if she gets benadryl before  IV contrast; patient states she tolerates contrast if she gets benadryl before     Ativan [Lorazepam] Hives     At the IV site     Baclofen      hives     Bees Hives, Swelling and Difficulty breathing     Caffeine      Contrast Dye      Hives,   Updated 5/10/2016 CT Contrast.     Iodine Hives     Methocarbamol Swelling     Metoclopramide      Other reaction(s): Tremors  LW Reaction: shaking/sweating     Midazolam      Other reaction(s): Agitation     Monosodium Glutamate      Morphine  Other (See Comments)     Difficulty with urination     Nsaids Other (See Comments)     GI BLEED x2     Other (Do Not Use) Other (See Comments)     Xanaflex- pt becomes disoriented and loses bladder control     Reglan [Metoclopramide Hcl] Other (See Comments)     shaking     Soma [Carisoprodol] Visual Disturbance     Sleep walking     Tizanidine      Topamax Other (See Comments)     Topamax [Topiramate] Nausea     Tingling  GI/Vomit     Tramadol      Severe Headache, Seizure Risk     Tylenol W/Codeine [Acetaminophen-Codeine] Nausea and Itching     Tylenol 3     Valium Other (See Comments)     Becomes aggressive and angry     Versed Other (See Comments)     Zolmitriptan      Makes face feel like its twitching     Droperidol Anxiety     Flu Virus Vaccine Rash      Arm swelling       Problem List:    Patient Active Problem List    Diagnosis Date Noted     Bulge of cervical disc without myelopathy 04/24/2019     Priority: Medium     Cervicalgia 04/17/2019     Priority: Medium     Class 1 obesity due to excess calories without serious comorbidity with body mass index (BMI) of 32.0 to 32.9 in adult 04/03/2019     Priority: Medium     Hypophosphatemia 11/01/2018     Priority: Medium     Patellar maltracking, right 09/05/2018     Priority: Medium     Right anterior knee pain 09/05/2018     Priority: Medium     Melanocytic nevus, unspecified location 07/23/2018     Priority: Medium     Dysplastic skin lesion 07/23/2018     Priority: Medium     Iliotibial band syndrome affecting lower leg, right 12/21/2017     Priority: Medium     Chondromalacia of right patellofemoral joint 12/21/2017     Priority: Medium     Upper GI bleed - suspected 07/01/2017     Priority: Medium     Tobacco use disorder 07/01/2017     Priority: Medium     History of pseudoseizure 07/01/2017     Priority: Medium     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) 03/10/2017     Priority: Medium     Lumbar disc disease with radiculopathy 02/23/2017      Priority: Medium     S/P lumbar fusion 02/23/2017     Priority: Medium     Dr. YOVANI Millan, 2/23/17       Vitamin D deficiency 01/06/2017     Priority: Medium     Atypical mole 01/06/2017     Priority: Medium     Mild persistent asthma without complication 12/02/2016     Priority: Medium     Chronic bilateral low back pain with left-sided sciatica 12/02/2016     Priority: Medium     Bee sting allergy 09/16/2016     Priority: Medium     Gastroesophageal reflux disease without esophagitis 08/26/2016     Priority: Medium     Herpes simplex virus infection 11/12/2015     Priority: Medium     Adjustment disorder with mixed anxiety and depressed mood 10/14/2015     Priority: Medium     Marijuana abuse 02/22/2015     Priority: Medium     Bipolar disorder (H) 01/31/2013     Priority: Medium     Problem list name updated by automated process. Provider to review       Chronic pain 02/09/2012     Priority: Medium     Patient is followed by Aura Rosario PA-C for ongoing prescription of pain medication.  All refills should only be approved by this provider, or covering partner.    Medication(s): Norco    Maximum quantity per month: 70  Clinic visit frequency required: Q 2 months     Controlled substance agreement:   Discussed and signed 4/25/17    Encounter-Level CSA - 01/12/2015:                 Controlled Substance Agreement - Scan on 1/26/2015  9:14 AM : Controlled Medication Agreement-01/12/15 (below)            Pain Clinic evaluation in the past: Yes       Date/Location:   [facility], fall 2016    DIRE Total Score(s):    4/25/2017   Total Score 17       Last [facility] website verification:  done on 4/25/17 by LOVE Maki   [email or web address]           Anxiety 09/22/2011     Priority: Medium     Mild major depression (H) 08/29/2011     Priority: Medium     Mild intermittent asthma 10/12/2009     Priority: Medium     Overview:   Formatting of this note might be different from the  original.  Action plan: See letter 4/10/2013   ATAQ:   Asthma Data 4/10/2013   Date completed 4/10/2013   Missed daily activities no = 0   Wake at night no = 0   Believe asthma in control yes = 0   ARIN use yes   Maximum ARIN use in 1 day 1-4 = 0   ATAQ score 0 = well controlled   Asthma ED visits in past 12 mos 0   Asthma hospitalizations in past 12 mos 0     Current Outpatient Prescriptions   Medication Sig     albuterol (PROVENTIL) 0.083 % neb solution Inhale 3 mL via a nebulizer every 4 hours if needed for Shortness Of Breath.        Crohn's colitis (H) 08/04/2009     Priority: Medium     Dysmenorrhea 05/11/2007     Priority: Medium     Benign neoplasm of skin of lower limb, including hip 03/16/2004     Priority: Medium     Migraine      Priority: Medium     Dr. Farrar - Neurology.  Now on Inderal.  Seems to be working.  Follow-up 9/08  Problem list name updated by automated process. Provider to review       Hearing loss      Priority: Medium     Congenital  Problem list name updated by automated process. Provider to review       Allergic rhinitis      Priority: Medium     Problem list name updated by automated process. Provider to review          Past Medical History:    Past Medical History:   Diagnosis Date     Abdominal pain 10/31- 11/4/2005     Allergic rhinitis, cause unspecified      Arthritis      C. 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     COMBINED ESOPHAGOSCOPY, GASTROSCOPY, DUODENOSCOPY (EGD) WITH CO2 INSUFFLATION N/A 4/12/2019    Procedure: COMBINED ESOPHAGOSCOPY, GASTROSCOPY, DUODENOSCOPY (EGD) WITH CO2 INSUFFLATION;  Surgeon: Edwin Conde MD;  Location: [facility] OR     ESOPHAGOSCOPY, GASTROSCOPY, DUODENOSCOPY (EGD), COMBINED N/A 4/12/2019    Procedure: Combined Esophagoscopy, Gastroscopy, Duodenoscopy (Egd), Biopsy Single Or Multiple;  Surgeon: Edwin Conde MD;  Location: [facility] OR     FUSION SPINE POSTERIOR MINIMALLY INVASIVE ONE LEVEL N/A 2/23/2017    Procedure: FUSION SPINE POSTERIOR MINIMALLY INVASIVE ONE LEVEL;  L4-5 Oblique Lateral Lumbar Interbody Fusion   Epidural steroid injection.   Transpedicular Bone marrow aspiration;  Surgeon: Jeniffer Eugene MD;  Location: [facility] OR     HC COLONOSCOPY THRU STOMA WITH BIOPSY  10/29/2003    Impression is that of normal appearing colonoscopy, without evidence of rectal bleeding.     HC COLONOSCOPY THRU STOMA, DIAGNOSTIC  10/00    normal     HC COLONOSCOPY THRU STOMA, DIAGNOSTIC  Oct 2009    Dr López- normal     HC EEG AWAKE AND SLEEP      abnormal     HC MRI BRAIN W/O CONTRAST  12/00    normal     HC REMOVAL GALLBLADDER  8/5/2009    [facility], [facility]     HC UGI ENDOSCOPY DIAG W BIOPSY  11/11/09    Normal esophagus     HC UGI ENDOSCOPY, SIMPLE EXAM  7/00, 10/00    mild chronic esophagitis and duodenitis, neg H pylori     HC UGI ENDOSCOPY, SIMPLE EXAM  01/20/2005    Esophagogastroduodenoscopy, colonoscopy with biopsies.  [facility] UGI ENDOSCOPY, SIMPLE EXAM  7/1/2009    [facility], [facility].      UGI ENDOSCOPY, SIMPLE EXAM   11/11/2009    attempted upper GI, pt. could not tolerate procedure:[facility]     ORTHOPEDIC SURGERY  October 19,2011    diskectomy L4-L5       Family History:    Family History   Problem Relation Age of Onset     Gastrointestinal Disease Brother         severe Crohn's     Neurologic Disorder Brother         Seizures post head injury     Depression Brother      Substance Abuse Brother      Genitourinary Problems Father         kidney stones     Diabetes Father      Heart Disease Father         Open heart surgery     Breast Cancer Maternal Grandmother      Parkinsonism Maternal Grandmother      Cerebrovascular Disease Paternal Grandmother      Cancer Maternal Grandfather         Lung     Cardiovascular Paternal Grandfather         Heart Attack     Substance Abuse Mother         in recovery x1 year      Lung Cancer Paternal Uncle      Cancer Paternal Uncle      Lung Cancer Maternal Uncle        Social History:  Marital Status:  Single [1]  Social History     Tobacco Use     Smoking status: Current Every Day Smoker     Packs/day: 0.50     Years: 5.00     Pack years: 2.50     Types: Cigarettes     Smokeless tobacco: Never Used   Substance Use Topics     Alcohol use: Not Currently     Comment: Not since last July     Drug use: Not Currently     Types: Marijuana     Comment: Medical Marijuana currently-- More CDB : quit to expensive         Medications:      acetaminophen (TYLENOL) 500 MG tablet   albuterol (2.5 MG/3ML) 0.083% neb solution   albuterol (PROAIR HFA/PROVENTIL HFA/VENTOLIN HFA) 108 (90 Base) MCG/ACT Inhaler   ARIPiprazole (ABILIFY) 15 MG tablet   busPIRone (BUSPAR) 15 MG tablet   clonazePAM (KLONOPIN) 0.5 MG tablet   cyclobenzaprine (FLEXERIL) 10 MG tablet   dicyclomine (BENTYL) 20 MG tablet   diphenhydrAMINE (BENADRYL) 50 MG capsule   DULoxetine (CYMBALTA) 60 MG EC capsule   EPINEPHrine (EPIPEN/ADRENACLICK/OR ANY BX GENERIC EQUIV) 0.3 MG/0.3ML injection 2-pack   gabapentin (NEURONTIN) 300 MG capsule    levocetirizine (XYZAL) 5 MG tablet   losartan (COZAAR) 25 MG tablet   medical cannabis (Patient's own supply)   medroxyPROGESTERone (DEPO-PROVERA) 150 MG/ML IM injection   methocarbamol (ROBAXIN) 750 MG tablet   mupirocin (BACTROBAN) 2 % external ointment   NONFORMULARY   nystatin (MYCOSTATIN) cream   oxyCODONE (ROXICODONE) 5 MG tablet   pantoprazole (PROTONIX) 40 MG EC tablet   polyethylene glycol (MIRALAX/GLYCOLAX) powder   prazosin (MINIPRESS) 1 MG capsule   prochlorperazine (COMPAZINE) 10 MG tablet   prochlorperazine (COMPAZINE) 25 MG suppository   rizatriptan (MAXALT-MLT) 5 MG ODT tab   sucralfate (CARAFATE) 1 GM tablet         Review of Systems   All other systems reviewed and are negative.      Physical Exam   BP: 108/75  Heart Rate: 93  Resp: 16  Weight: 82.1 kg (181 lb)  SpO2: 98 %      Physical Exam   Constitutional: She is oriented to person, place, and time. She appears well-developed and well-nourished. No distress.   HENT:   Head: Normocephalic and atraumatic.   Nose: Nose normal.   Mouth/Throat: Oropharynx is clear and moist. No oropharyngeal exudate.   Eyes: Conjunctivae are normal.   Neck: Normal range of motion. Neck supple.   Cardiovascular: Normal rate, regular rhythm and normal heart sounds. Exam reveals no friction rub.   No murmur heard.  Pulmonary/Chest: Effort normal and breath sounds normal. No stridor. No respiratory distress. She has no wheezes. She has no rales.   Abdominal: Soft. Bowel sounds are normal. She exhibits no distension and no mass. There is no tenderness. There is no guarding.   Musculoskeletal: Normal range of motion. She exhibits no tenderness.   Neurological: She is alert and oriented to person, place, and time.   Skin: Skin is warm and dry. Capillary refill takes less than 2 seconds. She is not diaphoretic. No erythema.   Psychiatric: Judgment normal.   Nursing note and vitals reviewed.      ED Course        Procedures         Results for orders placed or performed  during the hospital encounter of 06/29/19 (from the past 24 hour(s))   CBC with platelets differential   Result Value Ref Range    WBC 6.1 4.0 - 11.0 10e9/L    RBC Count 4.29 3.8 - 5.2 10e12/L    Hemoglobin 14.2 11.7 - 15.7 g/dL    Hematocrit 41.4 35.0 - 47.0 %    MCV 97 78 - 100 fl    MCH 33.1 (H) 26.5 - 33.0 pg    MCHC 34.3 31.5 - 36.5 g/dL    RDW 12.4 10.0 - 15.0 %    Platelet Count 297 150 - 450 10e9/L    Diff Method Automated Method     % Neutrophils 59.1 %    % Lymphocytes 28.2 %    % Monocytes 10.8 %    % Eosinophils 1.3 %    % Basophils 0.3 %    % Immature Granulocytes 0.3 %    Nucleated RBCs 0 0 /100    Absolute Neutrophil 3.6 1.6 - 8.3 10e9/L    Absolute Lymphocytes 1.7 0.8 - 5.3 10e9/L    Absolute Monocytes 0.7 0.0 - 1.3 10e9/L    Absolute Basophils 0.0 0.0 - 0.2 10e9/L    Abs Immature Granulocytes 0.0 0 - 0.4 10e9/L    Absolute Nucleated RBC 0.0    Comprehensive metabolic panel   Result Value Ref Range    Sodium 141 133 - 144 mmol/L    Potassium 3.9 3.4 - 5.3 mmol/L    Chloride 104 94 - 109 mmol/L    Carbon Dioxide 30 20 - 32 mmol/L    Anion Gap 7 3 - 14 mmol/L    Glucose 100 (H) 70 - 99 mg/dL    Urea Nitrogen 15 7 - 30 mg/dL    Creatinine 0.94 0.52 - 1.04 mg/dL    GFR Estimate 81 >60 mL/min/[1.73_m2]    GFR Estimate If Black >90 >60 mL/min/[1.73_m2]    Calcium 9.6 8.5 - 10.1 mg/dL    Bilirubin Total 0.5 0.2 - 1.3 mg/dL    Albumin 4.0 3.4 - 5.0 g/dL    Protein Total 7.5 6.8 - 8.8 g/dL    Alkaline Phosphatase 110 40 - 150 U/L    ALT 23 0 - 50 U/L    AST 15 0 - 45 U/L   Lipase   Result Value Ref Range    Lipase 70 (L) 73 - 393 U/L     *Note: Due to a large number of results and/or encounters for the requested time period, some results have not been displayed. A complete set of results can be found in Results Review.       Medications   0.9% sodium chloride BOLUS (has no administration in time range)     Followed by   sodium chloride 0.9% infusion (has no administration in time range)   promethazine  (PHENERGAN) 25 mg in sodium chloride 0.9 % 50 mL intermittent infusion (has no administration in time range)   diphenhydrAMINE (BENADRYL) injection 50 mg (has no administration in time range)     Labs are reviewed and once again was completely normal.  Patient was worried her potassium level was low again but it was normal.  Patient was given Phenergan, and IV Benadryl and IV fluids.  She states that the Phenergan was burning and she insisted on having her IV pulled and wanted to leave right away.  She refused to talk to the  but when I went back into the room to talk to her, we did use the .  I reviewed her labs and with the fact that her hemoglobin is stable and her potassium level is normal again, I do not think that there is any significant medical issue for this chronic condition.  She states that she did get some relief when she was put on steroids last time, this is not been a recurrent or common thing so I think it would be reasonable to try her on a Medrol Dosepak again.  She states that she was out of her Compazine at home, so I will prescribe her some more Compazine to use at home.  Patient had no vomiting while here, patient is safe to be discharged home.    Assessments & Plan (with Medical Decision Making)  Chronic abdominal pain, cyclical vomiting     I have reviewed the nursing notes.    I have reviewed the findings, diagnosis, plan and need for follow up with the patient.         Medication List      Started    methylPREDNISolone 4 MG tablet therapy pack  Commonly known as:  MEDROL DOSEPAK  Follow Package Directions            Final diagnoses:   Chronic abdominal pain   Non-intractable cyclical vomiting, presence of nausea not specified       6/29/2019   [facility] EMERGENCY DEPARTMENT     Oswaldo Zamora MD  06/29/19 [id number]

## 2019-07-01 ENCOUNTER — PATIENT OUTREACH (OUTPATIENT)
Dept: CARE COORDINATION | Facility: CLINIC | Age: 29
End: 2019-07-01

## 2019-07-01 ENCOUNTER — MYC MEDICAL ADVICE (OUTPATIENT)
Dept: FAMILY MEDICINE | Facility: OTHER | Age: 29
End: 2019-07-01

## 2019-07-01 NOTE — PROGRESS NOTES
"Clinic Care Coordination Contact  [facility]/Voicemail       Clinical Data: Care Coordinator Outreach  Outreach attempted x 1.  Patient has hearing loss, call was conducted using \"video relay\" service. Left message on voicemail with call back information and requested return call.    Plan: Care Coordinator mailed out care coordination introduction letter on 5/8/19. Care Coordinator will try to reach patient again in 3-5 business days.    RADHA Otto RN Clinic Care Coordinator   [place], [place], [place]  Phone: [phone]          "

## 2019-07-01 NOTE — TELEPHONE ENCOUNTER
Please let patient know regarding below. Allergy to Tizanidine. Would have to try Flexeril again as that is our only option. Please let patient know and route back if she needs a refill.     Zach Rosario PA-C  [facility]

## 2019-07-02 ENCOUNTER — NURSE TRIAGE (OUTPATIENT)
Dept: NURSING | Facility: CLINIC | Age: 29
End: 2019-07-02

## 2019-07-02 ASSESSMENT — ACTIVITIES OF DAILY LIVING (ADL): DEPENDENT_IADLS:: INDEPENDENT

## 2019-07-02 NOTE — TELEPHONE ENCOUNTER
OK to reschedule lab recheck to next week.     Zach Rosario PA-C  [facility]

## 2019-07-02 NOTE — TELEPHONE ENCOUNTER
used to assist with FNA call.  Pt reports vomiting since Friday, has seen green bile and a small amount of blood.  Declined a triage assessment, states she is not going to the ED and wants to make an appt with GI.      Writer connected with [facility] scheduling,  will contact patient directly as writer's phone not able to connect call.      Sonya Mahmood RN/FNA

## 2019-07-02 NOTE — PROGRESS NOTES
Clinic Care Coordination Contact  [facility]       Clinical Data: Care Coordinator Outreach  Outreach attempted x 2. Patient did not return call. Patient is active on mychart and frequently uses mychart. CC RN sent patient a message.     Plan: Care Coordinator will await patients response. No further proactive outreach unless patient chooses to engage in care coordination.    RADHA Otto RN Clinic Care Coordinator   [place], [place], [place]  Phone: [phone]

## 2019-07-03 ENCOUNTER — TELEPHONE (OUTPATIENT)
Dept: FAMILY MEDICINE | Facility: OTHER | Age: 29
End: 2019-07-03

## 2019-07-03 ENCOUNTER — OFFICE VISIT (OUTPATIENT)
Dept: GASTROENTEROLOGY | Facility: CLINIC | Age: 29
End: 2019-07-03
Payer: MEDICARE

## 2019-07-03 VITALS
SYSTOLIC BLOOD PRESSURE: 139 MMHG | HEART RATE: 103 BPM | BODY MASS INDEX: 32.33 KG/M2 | HEIGHT: 62 IN | DIASTOLIC BLOOD PRESSURE: 95 MMHG | OXYGEN SATURATION: 100 % | WEIGHT: 175.7 LBS

## 2019-07-03 DIAGNOSIS — K21.9 GASTROESOPHAGEAL REFLUX DISEASE WITHOUT ESOPHAGITIS: ICD-10-CM

## 2019-07-03 DIAGNOSIS — K62.5 RECTAL BLEEDING: ICD-10-CM

## 2019-07-03 DIAGNOSIS — G89.4 CHRONIC PAIN SYNDROME: ICD-10-CM

## 2019-07-03 DIAGNOSIS — R11.2 NAUSEA AND VOMITING, INTRACTABILITY OF VOMITING NOT SPECIFIED, UNSPECIFIED VOMITING TYPE: Primary | ICD-10-CM

## 2019-07-03 DIAGNOSIS — R19.7 DIARRHEA, UNSPECIFIED TYPE: ICD-10-CM

## 2019-07-03 PROCEDURE — 99214 OFFICE O/P EST MOD 30 MIN: CPT | Performed by: PHYSICIAN ASSISTANT

## 2019-07-03 ASSESSMENT — MIFFLIN-ST. JEOR: SCORE: 1470.22

## 2019-07-03 ASSESSMENT — ENCOUNTER SYMPTOMS
LIGHT-HEADEDNESS: 0
NERVOUS/ANXIOUS: 1
CHEST TIGHTNESS: 0
ADENOPATHY: 0
ROS GI COMMENTS: SEE HPI
HEMATURIA: 0
HEADACHES: 0
DYSURIA: 0
PHOTOPHOBIA: 0
BLOOD IN STOOL: 0
RECTAL PAIN: 1
SORE THROAT: 0
NAUSEA: 1
DIARRHEA: 1
FLANK PAIN: 0
VOMITING: 1
WEAKNESS: 0
UNEXPECTED WEIGHT CHANGE: 0
SHORTNESS OF BREATH: 0
FEVER: 0
DIFFICULTY URINATING: 0
ABDOMINAL PAIN: 1
COUGH: 0
TROUBLE SWALLOWING: 0

## 2019-07-03 ASSESSMENT — PAIN SCALES - GENERAL: PAINLEVEL: SEVERE PAIN (7)

## 2019-07-03 NOTE — TELEPHONE ENCOUNTER
Katy Islas is a 29 year old female who calls with abdominal pain and cut in hand.    NURSING ASSESSMENT:  Description:  I spoke with the pt () saw GI today, Has lost 6 lbs (chart review wt 6/29/2019 82.1 and today 79.7kg), vomiting a lot, diarrhea with blood this morning. Has a colonscopy on the 17th.     BP Readings from Last 3 Encounters:   07/03/19 (!) 139/95   06/29/19 108/75   06/26/19 124/89     Onset/duration:  ongoing  Precip. factors:  Saw GI today  Associated symptoms:  Cut her left hand yesterday, swollen with pus. Yesterday, 1/4 inch. Cut her hand on some glass. Put hydrogen peroxide. Not a deep cut.     Allergies:   Allergies   Allergen Reactions     Iohexol Hives     Other reaction(s): Hives  IV contrast; patient states she tolerates contrast if she gets benadryl before  IV contrast; patient states she tolerates contrast if she gets benadryl before     Ativan [Lorazepam] Hives     At the IV site     Baclofen      hives     Bees Hives, Swelling and Difficulty breathing     Caffeine      Contrast Dye      Hives,   Updated 5/10/2016 CT Contrast.     Iodine Hives     Methocarbamol Swelling     Metoclopramide      Other reaction(s): Tremors  LW Reaction: shaking/sweating     Midazolam      Other reaction(s): Agitation     Monosodium Glutamate      Morphine Other (See Comments)     Difficulty with urination     Nsaids Other (See Comments)     GI BLEED x2     Other (Do Not Use) Other (See Comments)     Xanaflex- pt becomes disoriented and loses bladder control     Reglan [Metoclopramide Hcl] Other (See Comments)     shaking     Soma [Carisoprodol] Visual Disturbance     Sleep walking     Tizanidine      Topamax Other (See Comments)     Topamax [Topiramate] Nausea     Tingling  GI/Vomit     Tramadol      Severe Headache, Seizure Risk     Tylenol W/Codeine [Acetaminophen-Codeine] Nausea and Itching     Tylenol 3     Valium Other (See Comments)     Becomes aggressive and angry      Versed Other (See Comments)     Zolmitriptan      Makes face feel like its twitching     Droperidol Anxiety     Flu Virus Vaccine Rash      Arm swelling     NURSING PLAN: Nursing advice to patient go to  to have hand looked at if she feels it is infected.     RECOMMENDED DISPOSITION:  Home care advice - if symptoms worsen go to ED. Appt schedule with PCP on Monday  Will comply with recommendation: Yes     Next 5 appointments (look out 90 days)    Jul 08, 2019  2:45 PM CDT  [facility] with Aura [facility]ario PA-C  [facility] [address] [place] [id number]  [phone]   Jul 11, 2019  9:00 AM CDT  Office Visit with Aura Rosario PA-C, Dayana Roldan  [facility] [address] [place] [id number]  [phone]          If further questions/concerns or if symptoms do not improve, worsen or new symptoms develop, call your PCP or [facility] as soon as possible.      Guideline used:  Telephone Triage Protocols for Nurses, Fifth Edition, Telma Jimenez RN

## 2019-07-03 NOTE — PROGRESS NOTES
GASTROENTEROLOGY FOLLOW UP CLINIC VISIT    CC/REFERRING MD:    Aura Rosario      REASON FOR CONSULTATION:   Referred by Aura Rosario for  RECHECK (Abdominal pain; Vomiting since 6/28/19; blood in stool this AM 7/3/19)          HISTORY OF PRESENT ILLNESS:    Katy Islas is 29 year old female who presents for follow up today.     HPI obtained through [facility] interpretor services.     She was initially seen on 4/8/19 for evaluation of hematemesis.  Please see initial note for more detail. We further evaluated with an upper endoscopy on 4/12/19 which did not reveal any signs or stigmata of bleeding.  There is a probable hiatal hernia otherwise exam was unremarkable. Biopsies were negative for H. pylori.  Her blood counts have also been noted to be stable. No signs of anemia. She continues to have significant reflux and n/v despite Protonix twice daily and Carafate several times a day. She was recommended to have an esophageal manometry and pH impedence testing for further evaluation however she was unable to complete these tests.     She previously noted bright red blood on stool and when wiping, she has had symptoms off and on for over a year.  She recently had a colonoscopy in August 2018 which did show signs of hemorrhoids and revealed decreased vascularity throughout the colon however biopsies did not show any diagnostic abnormality and were negative for microscopic, active and chronic colitis. There was no suggestion of IBD noted. (she did have brbpr at the time of colonoscopy). She was advised to have a repeat colonoscopy at our last follow up 3 weeks ago and she is scheduled for one later this month.     She returns again today for these same symptoms. She has abdominal pain and vomiting for the past week. She describes stabbing abdominal pain all over her abdomen. She continues to note blood in her stool. She has had multiple emergency department visits since our  last follow up for similar symptoms.  She was noted to have mild bowel wall thickening on the CT abdomen pelvis on a previous scan however during a more recent emergency department visit repeat CT of the abdomen pelvis did not reveal any acute findings. A recent CBC from 4 days ago reveals a normal red blood count and hemoglobin.  Lipase levels and liver enzymes are also unremarkable.     She does take oxycodone for pain about 5 times a day. She was previously using medical cannabis but states she has stopped due to it being expensive.     She notes occasional diarrhea. Stool studies were ordered at her last office visit including fecal calprotectin and infectious stool studies which have not yet been completed. She states she forgot to pick these tests up last time. She does endorse rectal pain. Denies fecal urgency, tenesmus or night time symptoms. She states that she has a history of crohn's disaease but was last treated at 17 years old.     Family hx is significant for crohn's disease in her brother.     PREVIOUS ENDOSCOPY:    Upper Endoscopy 4/12/19  Impression:       - Esophagogastric landmarks identified.                             - Gastroesophageal flap valve classified as Hill Grade III (minimal fold, loose to endoscope, hiatal hernia likely).                             - Normal stomach.                             - Normal duodenal bulb, first portion of the duodenum and second portion of the duodenum.                             - Biopsies were taken with a cold forceps for Helicobacter pylori testing.                             - No findings to account for complaint of coffee ground emesis and hematemesis.       SPECIMEN(S):   Antral biopsy     FINAL DIAGNOSIS:   ANTRUM, BIOPSY:   - Gastric mucosa with no significant inflammation   - No H. pylori like organisms identified on routine staining   - Negative for intestinal metaplasia or dysplasia     I have personally reviewed all specimens and/or  slides, including the listed special stains, and used them with my medical judgement to determine or confirm the final diagnosis.     Electronically signed out by:     Adrian Wagner M.D., [facility]       PROBLEM LIST  Patient Active Problem List    Diagnosis Date Noted     Bulge of cervical disc without myelopathy 04/24/2019     Priority: Medium     Cervicalgia 04/17/2019     Priority: Medium     Class 1 obesity due to excess calories without serious comorbidity with body mass index (BMI) of 32.0 to 32.9 in adult 04/03/2019     Priority: Medium     Hypophosphatemia 11/01/2018     Priority: Medium     Patellar maltracking, right 09/05/2018     Priority: Medium     Right anterior knee pain 09/05/2018     Priority: Medium     Melanocytic nevus, unspecified location 07/23/2018     Priority: Medium     Dysplastic skin lesion 07/23/2018     Priority: Medium     Iliotibial band syndrome affecting lower leg, right 12/21/2017     Priority: Medium     Chondromalacia of right patellofemoral joint 12/21/2017     Priority: Medium     Upper GI bleed - suspected 07/01/2017     Priority: Medium     Tobacco use disorder 07/01/2017     Priority: Medium     History of pseudoseizure 07/01/2017     Priority: Medium     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) 03/10/2017     Priority: Medium     Lumbar disc disease with radiculopathy 02/23/2017     Priority: Medium     S/P lumbar fusion 02/23/2017     Priority: Medium     Dr. YOVANI Millan, 2/23/17       Vitamin D deficiency 01/06/2017     Priority: Medium     Atypical mole 01/06/2017     Priority: Medium     Mild persistent asthma without complication 12/02/2016     Priority: Medium     Chronic bilateral low back pain with left-sided sciatica 12/02/2016     Priority: Medium     Bee sting allergy 09/16/2016     Priority: Medium     Gastroesophageal reflux disease without esophagitis 08/26/2016     Priority: Medium     Herpes simplex virus infection 11/12/2015     Priority: Medium      Adjustment disorder with mixed anxiety and depressed mood 10/14/2015     Priority: Medium     Marijuana abuse 02/22/2015     Priority: Medium     Bipolar disorder (H) 01/31/2013     Priority: Medium     Problem list name updated by automated process. Provider to review       Chronic pain 02/09/2012     Priority: Medium     Patient is followed by Aura Rosario PA-C for ongoing prescription of pain medication.  All refills should only be approved by this provider, or covering partner.    Medication(s): Norco    Maximum quantity per month: 70  Clinic visit frequency required: Q 2 months     Controlled substance agreement:   Discussed and signed 4/25/17    Encounter-Level CSA - 01/12/2015:                 Controlled Substance Agreement - Scan on 1/26/2015  9:14 AM : Controlled Medication Agreement-01/12/15 (below)            Pain Clinic evaluation in the past: Yes       Date/Location:   [facility], fall 2016    DIRE Total Score(s):    4/25/2017   Total Score 17       Last [facility] website verification:  done on 4/25/17 by LOVE Maki   [email or web address]           Anxiety 09/22/2011     Priority: Medium     Mild major depression (H) 08/29/2011     Priority: Medium     Mild intermittent asthma 10/12/2009     Priority: Medium     Overview:   Formatting of this note might be different from the original.  Action plan: See letter 4/10/2013   ATAQ:   Asthma Data 4/10/2013   Date completed 4/10/2013   Missed daily activities no = 0   Wake at night no = 0   Believe asthma in control yes = 0   ARIN use yes   Maximum ARIN use in 1 day 1-4 = 0   ATAQ score 0 = well controlled   Asthma ED visits in past 12 mos 0   Asthma hospitalizations in past 12 mos 0     Current Outpatient Prescriptions   Medication Sig     albuterol (PROVENTIL) 0.083 % neb solution Inhale 3 mL via a nebulizer every 4 hours if needed for Shortness Of Breath.        Crohn's colitis (H) 08/04/2009     Priority: Medium      Dysmenorrhea 05/11/2007     Priority: Medium     Benign neoplasm of skin of lower limb, including hip 03/16/2004     Priority: Medium     Migraine      Priority: Medium     Dr. Farrar - Neurology.  Now on Inderal.  Seems to be working.  Follow-up 9/08  Problem list name updated by automated process. Provider to review       Hearing loss      Priority: Medium     Congenital  Problem list name updated by automated process. Provider to review       Allergic rhinitis      Priority: Medium     Problem list name updated by automated process. Provider to review         PERTINENT PAST MEDICAL HISTORY:  (I personally reviewed this history with the patient at today's visit)   Past Medical History:   Diagnosis Date     Abdominal pain 10/31- 11/4/2005    [facility] admit for Crohn's     Allergic rhinitis, cause unspecified     Allergic rhinitis     Arthritis      C. difficile diarrhea     Past, no current diarrhea.     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, without mention of intractable epilepsy     pseudoseizures diagnosed after extensive neurologic eval     Migraine 07/21/12    D/C 07/22/12-[facility]     Migraine, unspecified, without mention of intractable migraine without mention of status migrainosus     Migraine     Mild intermittent asthma     mild intermittent     Mycoplasma infection in conditions classified elsewhere and of unspecified site      Other chronic pain     Back pain for 6 years     Renal disease     Kidney stones     Unspecified hearing loss     congenital  hearing loss         PREVIOUS SURGERIES: (I personally reviewed this history with the patient at today's visit)   Past Surgical History:   Procedure Laterality Date     AS REMOVAL OF COCCYX  10/18/2017     C FUSION OF SACROILIAC JOINT  10/26/2018     COLONOSCOPY  7/1/2009    [facility], [facility].     COMBINED ESOPHAGOSCOPY, GASTROSCOPY, DUODENOSCOPY (EGD) WITH CO2 INSUFFLATION N/A 4/12/2019    Procedure: COMBINED ESOPHAGOSCOPY, GASTROSCOPY, DUODENOSCOPY (EGD) WITH CO2 INSUFFLATION;  Surgeon: Edwin Conde MD;  Location: [facility] OR     ESOPHAGOSCOPY, GASTROSCOPY, DUODENOSCOPY (EGD), COMBINED N/A 4/12/2019    Procedure: Combined Esophagoscopy, Gastroscopy, Duodenoscopy (Egd), Biopsy Single Or Multiple;  Surgeon: Edwin Conde MD;  Location: [facility] OR     FUSION SPINE POSTERIOR MINIMALLY INVASIVE ONE LEVEL N/A 2/23/2017    Procedure: FUSION SPINE POSTERIOR MINIMALLY INVASIVE ONE LEVEL;  L4-5 Oblique Lateral Lumbar Interbody Fusion   Epidural steroid injection.   Transpedicular Bone marrow aspiration;  Surgeon: Jeniffer Eugene MD;  Location: [facility] OR     HC COLONOSCOPY THRU STOMA WITH BIOPSY  10/29/2003    Impression is that of normal appearing colonoscopy, without evidence of rectal bleeding.     HC COLONOSCOPY THRU STOMA, DIAGNOSTIC  10/00    normal     HC COLONOSCOPY THRU STOMA, DIAGNOSTIC  Oct 2009    Dr López- normal     HC EEG AWAKE AND SLEEP      abnormal     HC MRI BRAIN W/O CONTRAST  12/00    normal     HC REMOVAL GALLBLADDER  8/5/2009    [facility], [facility].     HC UGI ENDOSCOPY DIAG W BIOPSY  11/11/09    Normal esophagus     HC UGI ENDOSCOPY, SIMPLE EXAM  7/00, 10/00    mild chronic esophagitis and duodenitis, neg H pylori     HC UGI ENDOSCOPY, SIMPLE EXAM  01/20/2005    Esophagogastroduodenoscopy, colonoscopy with biopsies.  [facility]     HC UGI ENDOSCOPY, SIMPLE EXAM  7/1/2009    [facility], [facility].     HC UGI ENDOSCOPY, SIMPLE EXAM   11/11/2009    attempted upper GI, pt. could not tolerate procedure:[facility]     ORTHOPEDIC SURGERY  October 19,2011    diskectomy L4-L5         ALLERGIES:     Allergies   Allergen Reactions     Iohexol Hives     Other reaction(s): Hives  IV contrast; patient states she tolerates contrast if she gets benadryl before  IV contrast; patient states she tolerates contrast if she gets benadryl before     Ativan [Lorazepam] Hives     At the IV site     Baclofen      hives     Bees Hives, Swelling and Difficulty breathing     Caffeine      Contrast Dye      Hives,   Updated 5/10/2016 CT Contrast.     Iodine Hives     Methocarbamol Swelling     Metoclopramide      Other reaction(s): Tremors  LW Reaction: shaking/sweating     Midazolam      Other reaction(s): Agitation     Monosodium Glutamate      Morphine Other (See Comments)     Difficulty with urination     Nsaids Other (See Comments)     GI BLEED x2     Other (Do Not Use) Other (See Comments)     Xanaflex- pt becomes disoriented and loses bladder control     Reglan [Metoclopramide Hcl] Other (See Comments)     shaking     Soma [Carisoprodol] Visual Disturbance     Sleep walking     Tizanidine      Topamax Other (See Comments)     Topamax [Topiramate] Nausea     Tingling  GI/Vomit     Tramadol      Severe Headache, Seizure Risk     Tylenol W/Codeine [Acetaminophen-Codeine] Nausea and Itching     Tylenol 3     Valium Other (See Comments)     Becomes aggressive and angry     Versed Other (See Comments)     Zolmitriptan      Makes face feel like its twitching     Droperidol Anxiety     Flu Virus Vaccine Rash      Arm swelling       PERTINENT MEDICATIONS:    Current Outpatient Medications:      acetaminophen (TYLENOL) 500 MG tablet, Take 2 tablets (1,000 mg) by mouth 3 times daily as needed for mild pain, Disp: 100 tablet, Rfl: 0     albuterol (2.5 MG/3ML) 0.083% neb solution, Take 1 vial (2.5 mg) by nebulization every 6 hours as needed for shortness of breath /  dyspnea or wheezing, Disp: 50 vial, Rfl: 11     albuterol (PROAIR HFA/PROVENTIL HFA/VENTOLIN HFA) 108 (90 Base) MCG/ACT Inhaler, Inhale 2 puffs into the lungs every 6 hours as needed for shortness of breath / dyspnea or wheezing, Disp: 3 Inhaler, Rfl: 3     ARIPiprazole (ABILIFY) 15 MG tablet, Take 1 tablet (15 mg) by mouth At Bedtime, Disp: 90 tablet, Rfl: 3     busPIRone (BUSPAR) 15 MG tablet, Take 1 tablet (15 mg) by mouth 2 times daily, Disp: 60 tablet, Rfl: 5     clonazePAM (KLONOPIN) 0.5 MG tablet, TK 1/2 TO 1 T D PRA, Disp: , Rfl: 5     cyclobenzaprine (FLEXERIL) 10 MG tablet, Take 1 tablet (10 mg) by mouth 2 times daily as needed for muscle spasms or other (back pain), Disp: 60 tablet, Rfl: 0     dicyclomine (BENTYL) 20 MG tablet, Take 1 tablet (20 mg) by mouth 4 times daily as needed (ABDOMINAL CRAMPING) AS NEEDED FOR CRAMPING, Disp: 60 tablet, Rfl: 0     diphenhydrAMINE (BENADRYL) 50 MG capsule, Take 1-2 capsules ( mg) by mouth every 6 hours as needed for itching, allergies or sleep, Disp: 90 capsule, Rfl: 3     DULoxetine (CYMBALTA) 60 MG EC capsule, Take 1 capsule (60 mg) by mouth daily, Disp: 90 capsule, Rfl: 3     EPINEPHrine (EPIPEN/ADRENACLICK/OR ANY BX GENERIC EQUIV) 0.3 MG/0.3ML injection 2-pack, Inject 0.3 mLs (0.3 mg) into the muscle once as needed for anaphylaxis, Disp: 0.6 mL, Rfl: 3     gabapentin (NEURONTIN) 300 MG capsule, Take 1-3 capsules (300-900 mg) by mouth 3 times daily, Disp: 270 capsule, Rfl: 5     levocetirizine (XYZAL) 5 MG tablet, Take 1 tablet (5 mg) by mouth every evening, Disp: 90 tablet, Rfl: 3     losartan (COZAAR) 25 MG tablet, Take 1 tablet (25 mg) by mouth daily, Disp: 30 tablet, Rfl: 1     medical cannabis (Patient's own supply), (The purpose of this order is to document that the patient reports taking medical cannabis.  This is not a prescription, and is not used to certify that the patient has a qualifying medical condition.), Disp: 0 Information only, Rfl: 0      medroxyPROGESTERone (DEPO-PROVERA) 150 MG/ML IM injection, Inject 1 mL (150 mg) into the muscle every 3 months, Disp: 3 mL, Rfl: 3     methocarbamol (ROBAXIN) 750 MG tablet, Take 1 tablet (750 mg) by mouth 4 times daily as needed for muscle spasms, Disp: 60 tablet, Rfl: 1     methylPREDNISolone (MEDROL DOSEPAK) 4 MG tablet therapy pack, Follow Package Directions, Disp: 21 tablet, Rfl: 0     mupirocin (BACTROBAN) 2 % external ointment, Apply topically 3 times daily, Disp: , Rfl:      NONFORMULARY, Apply 30 mLs topically 4 times daily, Disp: , Rfl:      nystatin (MYCOSTATIN) cream, nystatin 100,000 unit/gram topical cream, Disp: , Rfl:      oxyCODONE (ROXICODONE) 5 MG tablet, Take 1 tablet (5 mg) by mouth every 8 hours as needed for severe pain (5/day) (two week supply), Disp: 70 tablet, Rfl: 0     pantoprazole (PROTONIX) 40 MG EC tablet, Take 1 tablet (40 mg) by mouth 2 times daily Take 30-60 minutes before a meal., Disp: 180 tablet, Rfl: 3     polyethylene glycol (MIRALAX/GLYCOLAX) powder, Take 17 g (1 capful) by mouth daily, Disp: 238 g, Rfl: 3     prazosin (MINIPRESS) 1 MG capsule, Take 1 mg by mouth At Bedtime, Disp: , Rfl: 5     prochlorperazine (COMPAZINE) 10 MG tablet, Take 1 tablet (10 mg) by mouth every 6 hours as needed for nausea or vomiting, Disp: 20 tablet, Rfl: 0     prochlorperazine (COMPAZINE) 25 MG suppository, Place 1 suppository (25 mg) rectally every 12 hours as needed for nausea, Disp: 12 suppository, Rfl: 3     rizatriptan (MAXALT-MLT) 5 MG ODT tab, Take 1-2 tablets (5-10 mg) by mouth at onset of headache for migraine May repeat in 2 hours. Max 6 tablets/24 hours., Disp: 18 tablet, Rfl: 3     sucralfate (CARAFATE) 1 GM tablet, Take 1 tablet (1 g) by mouth 4 times daily as needed (heartburn), Disp: 360 tablet, Rfl: 3    SOCIAL HISTORY:  Social History     Socioeconomic History     Marital status: Single     Spouse name: Not on file     Number of children: Not on file     Years of education: Not  on file     Highest education level: Not on file   Occupational History     Not on file   Social Needs     Financial resource strain: Not on file     Food insecurity:     Worry: Not on file     Inability: Not on file     Transportation needs:     Medical: Not on file     Non-medical: Not on file   Tobacco Use     Smoking status: Current Every Day Smoker     Packs/day: 0.50     Years: 5.00     Pack years: 2.50     Types: Cigarettes     Smokeless tobacco: Never Used   Substance and Sexual Activity     Alcohol use: Not Currently     Comment: Not since last July     Drug use: Not Currently     Types: Marijuana     Comment: Medical Marijuana currently-- More CDB : quit to expensive      Sexual activity: Not Currently     Partners: Male     Birth control/protection: Condom, Injection   Lifestyle     Physical activity:     Days per week: Not on file     Minutes per session: Not on file     Stress: Not on file   Relationships     Social connections:     Talks on phone: Not on file     Gets together: Not on file     Attends Hinduism service: Not on file     Active member of club or organization: Not on file     Attends meetings of clubs or organizations: Not on file     Relationship status: Not on file     Intimate partner violence:     Fear of current or ex partner: Not on file     Emotionally abused: Not on file     Physically abused: Not on file     Forced sexual activity: Not on file   Other Topics Concern      Service No     Blood Transfusions No     Caffeine Concern No     Occupational Exposure No     Hobby Hazards No     Sleep Concern No     Stress Concern No     Weight Concern No     Special Diet No     Back Care No     Exercise Yes     Bike Helmet No     Seat Belt Yes     Self-Exams Yes     Parent/sibling w/ CABG, MI or angioplasty before 65F 55M? Yes     Comment: father late 30's early 40's    Social History Narrative     Not on file       FAMILY HISTORY: (I personally reviewed this history with the  "patient at today's visit)  Family History   Problem Relation Age of Onset     Gastrointestinal Disease Brother         severe Crohn's     Neurologic Disorder Brother         Seizures post head injury     Depression Brother      Substance Abuse Brother      Genitourinary Problems Father         kidney stones     Diabetes Father      Heart Disease Father         Open heart surgery     Breast Cancer Maternal Grandmother      Parkinsonism Maternal Grandmother      Cerebrovascular Disease Paternal Grandmother      Cancer Maternal Grandfather         Lung     Cardiovascular Paternal Grandfather         Heart Attack     Substance Abuse Mother         in recovery x1 year      Lung Cancer Paternal Uncle      Cancer Paternal Uncle      Lung Cancer Maternal Uncle           Review of Systems   Constitutional: Negative for fever and unexpected weight change.   HENT: Negative for congestion, sore throat and trouble swallowing.    Eyes: Negative for photophobia and visual disturbance.   Respiratory: Negative for cough, chest tightness and shortness of breath.    Cardiovascular: Negative for chest pain and leg swelling.   Gastrointestinal: Positive for abdominal pain, diarrhea, nausea, rectal pain and vomiting. Negative for blood in stool.        See HPI   Endocrine: Negative for cold intolerance and heat intolerance.   Genitourinary: Negative for difficulty urinating, dysuria, flank pain and hematuria.   Skin: Negative for pallor and rash.   Allergic/Immunologic: Negative for immunocompromised state.   Neurological: Negative for weakness, light-headedness and headaches.   Hematological: Negative for adenopathy.   Psychiatric/Behavioral: The patient is nervous/anxious.        PHYSICAL EXAMINATION:  Constitutional: aaox3, anxious, not dyspneic/diaphoretic, no acute distress  Vitals reviewed: BP (!) 139/95 (BP Location: Left arm, Patient Position: Sitting, Cuff Size: Adult Regular)   Pulse 103   Ht 1.575 m (5' 2\")   Wt 79.7 kg " (175 lb 11.2 oz)   SpO2 100%   BMI 32.14 kg/m     Wt:   Wt Readings from Last 2 Encounters:   07/03/19 79.7 kg (175 lb 11.2 oz)   06/29/19 82.1 kg (181 lb)        Physical Exam   Constitutional: She is oriented to person, place, and time. She appears well-developed and well-nourished.   HENT:   Head: Normocephalic and atraumatic.   Nose: Nose normal.   Mouth/Throat: No oropharyngeal exudate.   Eyes: Pupils are equal, round, and reactive to light. Conjunctivae and EOM are normal. Right eye exhibits no discharge. Left eye exhibits no discharge. No scleral icterus.   Neck: Normal range of motion.   Cardiovascular: Normal rate and regular rhythm.   Pulmonary/Chest: Effort normal. No respiratory distress.   Abdominal: Soft. Bowel sounds are normal. She exhibits no distension and no mass. There is no tenderness. There is no rebound and no guarding.   Musculoskeletal: Normal range of motion.   Neurological: She is alert and oriented to person, place, and time.   Skin: Skin is warm and dry. She is not diaphoretic. No erythema. No pallor.   Psychiatric: Her mood appears anxious.   Nursing note and vitals reviewed.          PERTINENT STUDIES: (I personally reviewed these laboratory studies today)  Most recent CBC:  Recent Labs   Lab Test 06/29/19  1802 06/22/19  1736   WBC 6.1 6.7   HGB 14.2 12.5   HCT 41.4 38.4    251     Most recent hepatic panel:  Recent Labs   Lab Test 06/29/19  1802 06/22/19  1736   ALT 23 23   AST 15 7     Most recent creatinine:  Recent Labs   Lab Test 06/29/19 1802 06/22/19  1736   CR 0.94 0.68       TSH   Date Value Ref Range Status   02/20/2018 0.92 0.40 - 4.00 mU/L Final         RADIOLOGY:     EXAMINATION: CT ABDOMEN PELVIS W/O CONTRAST, 6/22/2019 7:30 PM     TECHNIQUE:  Helical CT images from the lung bases through the  symphysis pubis were obtained without IV contrast.     COMPARISON: CT abdomen pelvis 6/8/2019     HISTORY: Abd pain, gastroenteritis or colitis suspected; IV  contrast  allergy     FINDINGS:     Abdomen and pelvis: Liver is unremarkable. Status post  cholecystectomy. Mild fatty atrophy of the pancreatic parenchyma. The  preliminary adrenal glands are unremarkable. Small nonobstructing  stones in both kidneys, measuring up to 3 mm at the mid pole of the  right kidney. No ureteral stones. Urinary bladder is unremarkable. No  pelvic masses. The small and large bowel are normal in caliber. No  abnormal bowel wall thickening or adjacent stranding. The appendix is  unremarkable. No free fluid in the abdomen or pelvis. No free  intraperitoneal air. No enlarged inguinal or intra-abdominal lymph  nodes.     Lung bases: Heart is normal in size. No pericardial effusion. Mild  bibasilar atelectasis.     Bones and soft tissues: Postsurgical changes of fusion of the left  sacroiliac joint, and posterior approach fusion instrumentation at  L4-5 with interbody spacers. No acute osseous abnormalities or  suspicious osseous lesions.                                                                      IMPRESSION:   1. No acute findings in the abdomen or pelvis.  2. Multiple small nonobstructing stones in both kidneys.      I have personally reviewed the examination and initial interpretation  and I agree with the findings.     LUDY NOLAND MD        CT ABDOMEN PELVIS W/O CONTRAST 6/8/2019 4:04 PM     HISTORY: Abdominal pain.     TECHNIQUE: CT imaging of the abdomen and pelvis is performed without  IV contrast.  Abdominal organs, pelvic organs, bowel, aorta,  retroperitoneum, and abdominal wall are also assessed to the limits of  no IV contrast.  Radiation dose for this scan was reduced using  automated exposure control, adjustment of the mA and/or kV according  to patient size, or iterative reconstruction technique.     COMPARISON: May 7, 2019.     FINDINGS:     Liver: Normal.  Gallbladder: Surgically  absent.  Pancreas:Normal.  Spleen:Normal.  Adrenals:Normal.  Ascites:None.     Kidneys/ureters: There are tiny punctate bilateral nonobstructing  renal stones, as before. No hydronephrosis.   Bladder:Normal.  Pelvic free fluid:None.     Bowels: Mild bowel wall thickening of the transverse and descending  colon which could reflect mild colitis. No evidence of obstruction.  Appendix:Normal.     Abdominal or pelvic lymphadenopathy:None.     Miscellaneous findings:None.                                                                      IMPRESSION:     1. Mild bowel wall thickening of the transverse and descending colon  which could reflect mild colitis.  2. Status post cholecystectomy.  3. Tiny punctate bilateral nonobstructing renal stones.     LB JOE MD    ABDOMEN TWO-THREE VIEW  12/10/2018 9:02 PM       HISTORY: Mid abdominal pain.      COMPARISON: October 23, 2017     FINDINGS: Small amount of stool. No free air. There are no air filled  distended loops of small bowel. The colon is not distended. The lung  bases are unremarkable.                                                                      IMPRESSION: Nonobstructed bowel gas pattern.     MACHO WADE MD    CT ABDOMEN PELVIS WITHOUT CONTRAST   12/9/2018 8:16 PM      HISTORY: Abdominal pain.     TECHNIQUE: Volumetric helical sections were acquired from the lung  bases through the ischial tuberosities without IV contrast. Coronal  images were also reconstructed. Radiation dose for this scan was  reduced using automated exposure control, adjustment of the mA and/or  kV according to patient size, or iterative reconstruction technique.     COMPARISON: CT of the abdomen and pelvis without IV contrast performed  11/20/2018.     FINDINGS: No bowel obstruction. No convincing evidence for colitis or  diverticulitis. There is a trace amount of nonspecific free fluid in  the pelvis. Unremarkable appendix. Prior cholecystectomy. There are a  few nonobstructing  stones in both kidneys, measuring 0.3 cm or  smaller, unchanged. No ureteral calculi or hydronephrosis. The liver,  spleen, adrenal glands, pancreas, and kidneys have otherwise  unremarkable noncontrast appearances. The visualized lung bases are  clear. Postoperative changes of posterior marilyn and pedicle screw fusion  are noted at L4-5. There are also surgical screws crossing the left  sacroiliac joint.                                                                      IMPRESSION:   1. No acute abnormality in the abdomen or pelvis. No definite cause  for abdominal pain is identified.  2. Scattered small nonobstructing stones in both kidneys are  unchanged.     MARCELINA HALL MD      CT ABDOMEN AND PELVIS WITHOUT CONTRAST   11/20/2018 6:10 PM      HISTORY: Left lower quadrant pain and bloody stools.     TECHNIQUE: No IV contrast material. Radiation dose for this scan was  reduced using automated exposure control, adjustment of the mA and/or  kV according to patient size, or iterative reconstruction technique.     COMPARISON: None.     FINDINGS:    Abdomen: Cholecystectomy. Normal appearance of the liver, spleen,  pancreas and adrenal glands. There are multiple tiny calculi or  pre-calculi in the renal collecting systems or medullary pyramids with  no evidence of hydronephrosis. No ureteral dilatation. No free air,  free fluid or adenopathy. Normal-appearing stomach, small bowel, colon  and appendix. No hernia. No colonic diverticulosis or diverticulitis.     Pelvis: Postoperative changes in the lower lumbar spine from fusion  surgery. The bladder, uterus and ovaries appear normal.                                                                      IMPRESSION:  1. No specific etiology seen for the left lower quadrant pain and  bloody stools.  2. Multiple tiny calculi or pre-calculi in the renal collecting system  or medullary pyramids. No hydronephrosis.     IMAN ALCANTARA MD        ASSESSMENT/PLAN:    Katy BRAVO  Janel is a 29 year old female who presents for follow up.     She was initially seen on 4/8/2019 for further evaluation of hematemesis.  We further evaluated with an upper endoscopy which did not reveal any signs of bleeding and besides a probable hiatal hernia was unremarkable.  Due to her continued reflux and n/v, she was recommended to have esophageal manometry and pH impedance testing as well as barium esophagram. She was unable to complete the motility study due to anxiety of having tubing through her nose and down the back of her throat.  She is continuing to take Protonix twice daily and Carafate but she does continue to have nausea and vomiting. She does have hx of lap cholecystectomy. Discussed again that pain medication could be causing/contributing to her symptoms. She will discuss with her primary care if there are any other options. Advised pt to trail FD amber for continued symptoms as there may be functional component. We can reconsider esophageal manometry and pH impedence testing in the near future if she is willing to perform tests. She agrees with this plan.     In regards to continued abd pain and rectal bleeding, she is scheduled for a colonoscopy for further evaluation. Her recent blood count is stable which is reassuring and also a recent CT abd/pelvis on 6/22/19does not show any acute findings. A previous scan just a few weeks prior on 6/8/19 however did reveal bowel wall thickening which is concerning given her history of crohn's. Her last colonoscopy was in August 2018 which did show signs of hemorrhoids and revealed decreased vascularity throughout the colon however biopsies did not show any diagnostic abnormality and were negative for microscopic, active and chronic colitis. We should continue with colonoscopy evaluation however at this time due to recent findings of colitis on CT abd/pelvis dated 6/8/19. Also recommended pt to complete stool tests, she will head to the lab today after  our office visit to  the stool kits.     Nausea and vomiting, intractability of vomiting not specified, unspecified vomiting type  Gastroesophageal reflux disease without esophagitis  Rectal bleeding  Diarrhea, unspecified type  Chronic pain syndrome        Follow up after procedures.     Thank you for this consultation.  It was a pleasure to participate in the care of this patient; please contact us with any further questions.  A total of 25 minutes, face to face, was spent with this patient, >50% of which was counseling regarding the above delineated issues.            This note was created with voice recognition software, and while reviewed for accuracy, typos may remain.     Junaid Pritchett PA-C  Gastroenterology  [facility]

## 2019-07-03 NOTE — TELEPHONE ENCOUNTER
Reason for call:  Symptom  Reason for call:  Patient reporting a symptom    Symptom or request: high bp and infected hand    Duration (how long have symptoms been present): yesterday    Have you been treated for this before? Yes to bp, no to hand    Additional comments: pt is calling to get in asap with CDl. Her bp is high and she keeps throwing up. She is concerned. Pt also cut her hand yesterday and today it is swollen and pus like.     Phone Number patient can be reached at:  Cell number on file:    Telephone Information:   Mobile [phone]       Best Time:  anytime    Can we leave a detailed message on this number:  YES    Call taken on 7/3/2019 at 3:05 PM by Sheryl Ruby

## 2019-07-03 NOTE — NURSING NOTE
"Katy Islas's goals for this visit include:   Chief Complaint   Patient presents with     RECHECK     Abdominal pain; Vomiting since 6/28/19; blood in stool this AM 7/3/19       She requests these members of her care team be copied on today's visit information: PCP    PCP: Aura Rosario    Referring Provider:  No referring provider defined for this encounter.    BP (!) 139/95 (BP Location: Left arm, Patient Position: Sitting, Cuff Size: Adult Regular)   Pulse 103   Ht 1.575 m (5' 2\")   Wt 79.7 kg (175 lb 11.2 oz)   SpO2 100%   BMI 32.14 kg/m      Do you need any medication refills at today's visit? No    Cathy Benitez LPN      "

## 2019-07-03 NOTE — PATIENT INSTRUCTIONS
Please continue your protonix and carafate daily.   Try FD amber for your continued symptoms.     Continue with colonoscopy as scheduled.   Complete the stool studies when able to and submit them to [facility]

## 2019-07-05 NOTE — PROGRESS NOTES
"Subjective     Katy Islas is a 29 year old female who presents to clinic today for the following health issues:    HPI   ED/UC Followup:    Facility:  [facility]  Date of visit: this morning  Reason for visit: Hypertension   Current Status: not feeling the best     - She quit taking her pain meds because GI told her too.     Colonoscopy 7/17/19      Was making her throw up all day      Some withdrawals, stopped last Thursday      Told to get a patch instead        - Back still hurting      Will call to make appointment with Dr. Millan      Got myelogram and MRI - Neurosurgery said was fine (Madeline/Shazia)      Appointment on August 7th? With [facility] in [place]        Will pay out of pocket for robaxin      Asked GI if Fentanyl patch would help, they said maybe since not in stomach    - Sleep?    Thinks sleep walks and has hallucinations       - Smoking, cutting back, 5/day      Chantix gave strange dreams      Patch wasn't covered by insurance     - Started losartan 2 weeks ago              BP Readings from Last 3 Encounters:   07/08/19 (!) 138/104   07/03/19 (!) 139/95   06/29/19 108/75    Wt Readings from Last 3 Encounters:   07/08/19 80.3 kg (177 lb)   07/03/19 79.7 kg (175 lb 11.2 oz)   06/29/19 82.1 kg (181 lb)           Review of Systems   ROS COMP: Constitutional, HEENT, cardiovascular, pulmonary, gi and gu systems are negative, except as otherwise noted.      Objective    BP (!) 138/104   Pulse 120   Temp 99.1  F (37.3  C) (Temporal)   Resp 16   Ht 1.575 m (5' 2\")   Wt 80.3 kg (177 lb)   SpO2 98%   BMI 32.37 kg/m    Body mass index is 32.37 kg/m .  Physical Exam   GENERAL APPEARANCE: healthy, alert and no distress  EYES: Eyes grossly normal to inspection, PERRLA, conjunctivae and sclerae without injection or discharge, EOM intact   RESP: Lungs clear to auscultation - no rales, rhonchi or wheezes    CV: Regular rates and rhythm, normal S1 S2, no S3 or S4, no murmur, click or rub, no peripheral edema " and peripheral pulses strong and symmetric bilaterally   MS: No musculoskeletal defects are noted and gait is age appropriate without ataxia   SKIN: No suspicious lesions or rashes, hydration status appears adeuqate with normal skin turgor   PSYCH: Alert and oriented x3; speech- coherent , normal rate and volume; able to articulate logical thoughts, able to abstract reason, no tangential thoughts, no hallucinations or delusions, mentation appears normal, Mood is euthymic. Affect is appropriate for this mood state and bright. Thought content is free of suicidal ideation, hallucinations, and delusions. Dress is adequate and upkept. Eye contact is good during conversation.       Diagnostic Test Results:  Labs reviewed in Epic  See orders pending in Epic       Assessment & Plan       ICD-10-CM    1. Hypertension goal BP (blood pressure) < 130/80 I10 TSH with free T4 reflex     Parathyroid Hormone Intact     losartan (COZAAR) 50 MG tablet     SLEEP EVALUATION & MANAGEMENT REFERRAL - [facility] [phone]     aspirin (ASA) 81 MG tablet     metoprolol succinate ER (TOPROL-XL) 50 MG 24 hr tablet   2. Tobacco use disorder F17.200 nicotine (NICODERM CQ) 14 MG/24HR 24 hr patch   3. Snoring R06.83 SLEEP EVALUATION & MANAGEMENT REFERRAL - [facility] [phone]   4. Chronic pain syndrome G89.4 fentaNYL (DURAGESIC) 12 mcg/hr 72 hr patch   5. Chronic bilateral low back pain with left-sided sciatica M54.42 fentaNYL (DURAGESIC) 12 mcg/hr 72 hr patch    G89.29      1. HTN   - New issue for patient over past few weeks  - Discussed possible etiology pain vs. Essential htn vs. Smoking vs. Other reversible etiology vs sleep apnea vs. Other   - Many labs done recently due to GI issue, no signs kidney dysfunction, electrolytes and CBC also normal   - Will get lab today for TSH and PTH   - Will work on cutting back smoking (see below)   - For now will  treat HTN       Increase Losartan to 50 mg AND start Metoprolol 50 mg once a day in the morning      OK to take 1 Metoprolol when you  today and one tomorrow AM      Start baby aspirin (81 mg)       Recheck blood pressure in 1 week (nurse only appointment)           Drop off blood pressure log or [employer] message blood pressures from the week      Monitor Blood pressure once a day in the morning       Recheck with provider 2 weeks - 7/25/19     2. Tobacco   - Patient will try patch again, down to ~5/day  - Failed on Chantix     3. Snores, new HTN, recommend sleep study, patient to schedule     4 & 5. Pain   - As previously, patient with complicated spinal history including L4-5 disectomy (10/19/11), L4-5 fusion (2/23/17), coccyx fracture and removal (complicated by infection - 10/18/17), and SI Joint fusion (10/26/18)   - Records received from [facility] - Dr. 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 Millan      Has appointment [facility]     - Received orders from initial evaluation at [facility]      Patient states sore but likes going and will continue         - Patient is on medical marijuana for mood/PTSD issues (following with counseling and psychiatry as well)   - Icing regularly along with stretches from previous physical therapy   - Also taking Gabapentin and Flexeril   -  reviewed   - CSA - 4/24/19        Oxycodone 5 mg, max 5/day,  total dose 25 mg per day, MME 37.5      Reviewed risks of narcotic therapy including sedation, addiction, and constipation       Has had history of drug-seeking behavior but not for awhile, as above does have pathology     - As above, patient reports GI says pain medications causing her severe nausea and vomiting, reviewed note and does state that could be contributing, patient states was told to discuss Fentanyl patch, this was not found in GI note   - Discussed this would be step up instead of step down on medication as we were planning   - Will do trial for 2 weeks and provide only until gets into pain management      Discussed not very comfortable with this plan but does have upcoming pain management appointment and colonoscopy due to severe ongoing GI issues (many ED visits in the past few weeks)   - Will start with lowest possible Fentanyl patch 12 mg/72 hours      Reviewed use and side effects including sedation and addiction       Discussed risks of overdose, declined narcan   - Stop all pain medications except for Robaxin and Fentayl and Gabapentin      But if you start to get over-sedated stop Gabapentin and Robaxin and/or take the patch off   - Recheck 2 weeks       The patient indicates understanding of these issues and agrees with the plan.    Return in about 2 weeks (around 7/22/2019).    Due to language barrier, an  was present during the history-taking and subsequent discussion (and for part of the physical exam) with this patient.      Aura Rosario PA-C  [facility]

## 2019-07-08 ENCOUNTER — OFFICE VISIT (OUTPATIENT)
Dept: FAMILY MEDICINE | Facility: OTHER | Age: 29
End: 2019-07-08
Payer: MEDICARE

## 2019-07-08 ENCOUNTER — TELEPHONE (OUTPATIENT)
Dept: FAMILY MEDICINE | Facility: OTHER | Age: 29
End: 2019-07-08

## 2019-07-08 ENCOUNTER — TRANSFERRED RECORDS (OUTPATIENT)
Dept: HEALTH INFORMATION MANAGEMENT | Facility: CLINIC | Age: 29
End: 2019-07-08

## 2019-07-08 ENCOUNTER — NURSE TRIAGE (OUTPATIENT)
Dept: FAMILY MEDICINE | Facility: OTHER | Age: 29
End: 2019-07-08

## 2019-07-08 VITALS
TEMPERATURE: 99.1 F | HEART RATE: 120 BPM | BODY MASS INDEX: 32.57 KG/M2 | SYSTOLIC BLOOD PRESSURE: 138 MMHG | HEIGHT: 62 IN | WEIGHT: 177 LBS | DIASTOLIC BLOOD PRESSURE: 104 MMHG | RESPIRATION RATE: 16 BRPM | OXYGEN SATURATION: 98 %

## 2019-07-08 DIAGNOSIS — G89.4 CHRONIC PAIN SYNDROME: ICD-10-CM

## 2019-07-08 DIAGNOSIS — G89.29 CHRONIC BILATERAL LOW BACK PAIN WITH LEFT-SIDED SCIATICA: ICD-10-CM

## 2019-07-08 DIAGNOSIS — F17.200 TOBACCO USE DISORDER: ICD-10-CM

## 2019-07-08 DIAGNOSIS — M54.42 CHRONIC BILATERAL LOW BACK PAIN WITH LEFT-SIDED SCIATICA: ICD-10-CM

## 2019-07-08 DIAGNOSIS — I10 HYPERTENSION GOAL BP (BLOOD PRESSURE) < 130/80: Primary | ICD-10-CM

## 2019-07-08 DIAGNOSIS — R06.83 SNORING: ICD-10-CM

## 2019-07-08 LAB
PTH-INTACT SERPL-MCNC: 51 PG/ML (ref 18–80)
TSH SERPL DL<=0.005 MIU/L-ACNC: 1.66 MU/L (ref 0.4–4)

## 2019-07-08 PROCEDURE — 84443 ASSAY THYROID STIM HORMONE: CPT | Performed by: PHYSICIAN ASSISTANT

## 2019-07-08 PROCEDURE — 36415 COLL VENOUS BLD VENIPUNCTURE: CPT | Performed by: PHYSICIAN ASSISTANT

## 2019-07-08 PROCEDURE — T1013 SIGN LANG/ORAL INTERPRETER: HCPCS | Mod: U3 | Performed by: PHYSICIAN ASSISTANT

## 2019-07-08 PROCEDURE — 83970 ASSAY OF PARATHORMONE: CPT | Performed by: PHYSICIAN ASSISTANT

## 2019-07-08 PROCEDURE — 99214 OFFICE O/P EST MOD 30 MIN: CPT | Performed by: PHYSICIAN ASSISTANT

## 2019-07-08 RX ORDER — NICOTINE 21 MG/24HR
1 PATCH, TRANSDERMAL 24 HOURS TRANSDERMAL EVERY 24 HOURS
Qty: 21 PATCH | Refills: 3 | Status: SHIPPED | OUTPATIENT
Start: 2019-07-08 | End: 2019-09-18

## 2019-07-08 RX ORDER — LOSARTAN POTASSIUM 50 MG/1
50 TABLET ORAL DAILY
Qty: 30 TABLET | Refills: 3 | Status: SHIPPED | OUTPATIENT
Start: 2019-07-08 | End: 2019-07-22

## 2019-07-08 RX ORDER — FENTANYL 12.5 UG/1
1 PATCH TRANSDERMAL
Qty: 10 PATCH | Refills: 0 | Status: SHIPPED | OUTPATIENT
Start: 2019-07-08 | End: 2019-08-05 | Stop reason: DRUGHIGH

## 2019-07-08 RX ORDER — METOPROLOL SUCCINATE 50 MG/1
50 TABLET, EXTENDED RELEASE ORAL DAILY
Qty: 30 TABLET | Refills: 1 | Status: SHIPPED | OUTPATIENT
Start: 2019-07-08 | End: 2019-07-15

## 2019-07-08 ASSESSMENT — MIFFLIN-ST. JEOR: SCORE: 1476.12

## 2019-07-08 ASSESSMENT — PAIN SCALES - GENERAL: PAINLEVEL: MODERATE PAIN (4)

## 2019-07-08 NOTE — TELEPHONE ENCOUNTER
Reason for call:  Patient reporting a symptom    Symptom or request: HBP    Duration (how long have symptoms been present): 1 DAY    Have you been treated for this before? No    Additional comments: Patient is asking to speak with a nurse. She says her BP was 170/114. Sent to triage    Phone Number patient can be reached at:  Home number on file [phone] (home)    Best Time:  any    Can we leave a detailed message on this number:  YES    Call taken on 7/8/2019 at 9:58 AM by Genie Luo

## 2019-07-08 NOTE — PATIENT INSTRUCTIONS
1. Schedule sleep study (check with insurance for coverage)     2. Increase Losartan to 50 mg AND start Metoprolol 50 mg once a day in the morning      OK to take 1 Metoprolol when you  today and one tomorrow AM    3. Start baby aspirin (81 mg)     4. Recheck blood pressure in 1 week (nurse only appointment)          Drop off blood pressure log or [employer] message blood pressures from the week      Monitor Blood pressure once a day in the morning        5. Recheck with provider 2 weeks - 7/25/19     6. Make appointment with Dr. Millan     7. Stop all pain medications except for Robaxin and Fentayl and Gabapentin      But if you start to get over-sedated stop Gabapentin and Robaxin and/or take the patch off

## 2019-07-08 NOTE — TELEPHONE ENCOUNTER
"Patient calling with [employer] phone  to see if she can come to the clinic earlier than 2:45pm today.    1. BLOOD PRESSURE: \"What is the blood pressure?\" \"Did you take at least two measurements 5 minutes apart?\"   She took her BP at [facility] or [facility] last night and it was 170/114 - it has never been this high.   no    She is not able to check her BP now, she does not have a BP machine (has ordered one though)    2. ONSET: \"When did you take your blood pressure?\"   Last night    3. HOW: \"How did you obtain the blood pressure?\" (e.g., visiting nurse, automatic home BP monitor)   Automatic at [facility]    4. HISTORY: \"Do you have a history of high blood pressure?\"   yes    5. MEDICATIONS: \"Are you taking any medications for blood pressure?\" \"Have you missed any doses recently?\"   Denies missing any medications lately, does take medications for HTN     6. OTHER SYMPTOMS: \"Do you have any symptoms?\" (e.g., headache, chest pain, blurred vision, difficulty breathing, weakness)   Nausea - 'feels a little queezy'   Palpitations - states she has been feeling them more lately, but has hx of feeling that as well   No CP, SOB, weakness     7. PREGNANCY: \"Is there any chance you are pregnant?\" \"When was your last menstrual period?\"   No    *Protocol states she should go to ER. She agrees to go to ER, but states she will also be keeping her appt with CDL today for a hospital f/u.  Instructed her to cancel this appt asap if she determines she cannot make it in - she agrees to do so.    Additional Information    Negative: Sounds like a life-threatening emergency to the triager    Negative: Pregnant > 20 weeks and new hand or face swelling    Negative: Pregnant > 20 weeks and BP > 140/90    Systolic BP >= 160 OR Diastolic >= 100, and any cardiac or neurologic symptoms (e.g., chest pain, difficulty breathing, unsteady gait, blurred vision)    Protocols used: HIGH BLOOD PRESSURE-A-OH    Marianna Layne, RN, BSN      "

## 2019-07-08 NOTE — TELEPHONE ENCOUNTER
Will discuss today. Likely will continue Flexeril.    Zach Rosario PA-C  [facility]

## 2019-07-09 ENCOUNTER — MYC MEDICAL ADVICE (OUTPATIENT)
Dept: FAMILY MEDICINE | Facility: OTHER | Age: 29
End: 2019-07-09

## 2019-07-09 ENCOUNTER — TELEPHONE (OUTPATIENT)
Dept: FAMILY MEDICINE | Facility: OTHER | Age: 29
End: 2019-07-09

## 2019-07-09 DIAGNOSIS — I10 HYPERTENSION GOAL BP (BLOOD PRESSURE) < 130/80: ICD-10-CM

## 2019-07-09 DIAGNOSIS — I10 HYPERTENSION, UNSPECIFIED TYPE: ICD-10-CM

## 2019-07-09 RX ORDER — LOSARTAN POTASSIUM 50 MG/1
50 TABLET ORAL DAILY
Qty: 30 TABLET | Refills: 3 | OUTPATIENT
Start: 2019-07-09

## 2019-07-09 RX ORDER — METOPROLOL SUCCINATE 50 MG/1
50 TABLET, EXTENDED RELEASE ORAL DAILY
Qty: 30 TABLET | Refills: 1 | OUTPATIENT
Start: 2019-07-09

## 2019-07-09 NOTE — TELEPHONE ENCOUNTER
Sent mychart.   Pt just saw CDL 7/8 and is scheduled 7/11 what is she being seen for, can this be cancelled.   Jenni Luo MA

## 2019-07-09 NOTE — TELEPHONE ENCOUNTER
We are still working on controlling her BP. Unclear what doses will be needed. So I will not be providing 90 day supply at this time. Will do this once she is stable. Please notify pharmacy.     Zach Rosario PA-C   [place]  [place]

## 2019-07-09 NOTE — TELEPHONE ENCOUNTER
Pharmacy Note: patient is requesting authorization to dispense a 90 day supply of losartan (COZAAR) 50 MG tablet and metoprolol succinate ER (TOPROL-XL) 50 MG 24 hr tablet

## 2019-07-09 NOTE — TELEPHONE ENCOUNTER
Pending Prescriptions:                       Disp   Refills    losartan (COZAAR) 50 MG tablet            30 tab*3            Sig: Take 1 tablet (50 mg) by mouth daily    metoprolol succinate ER (TOPROL-XL) 50 MG*30 tab*1            Sig: Take 1 tablet (50 mg) by mouth daily    BP Readings from Last 3 Encounters:   07/08/19 (!) 138/104   07/03/19 (!) 139/95   06/29/19 108/75     Routing refill request to provider for review/approval because:  Labs out of range:  B/P  Requesting 90 days supply    Claudine Jimenez, RN, BSN

## 2019-07-10 ENCOUNTER — TRANSFERRED RECORDS (OUTPATIENT)
Dept: HEALTH INFORMATION MANAGEMENT | Facility: CLINIC | Age: 29
End: 2019-07-10

## 2019-07-10 ENCOUNTER — MYC MEDICAL ADVICE (OUTPATIENT)
Dept: FAMILY MEDICINE | Facility: OTHER | Age: 29
End: 2019-07-10

## 2019-07-10 LAB — INR PPP: 0.9 (ref 0.9–1.1)

## 2019-07-10 NOTE — TELEPHONE ENCOUNTER
Pasted from other Novavaxhart.      Preston Serrano, go ahead cancel appt for July 11th. My blood pressure right now is 146/103. I took my meds today. Will keep you posted.  I got my blood pressure monitor today in the mail. I like it.    Anisha Beebe MA

## 2019-07-11 NOTE — TELEPHONE ENCOUNTER
RN please call to discuss. May need to try alcohol wipes on skin first    Also, please have patient give a couple of her blood pressure readings and document.     Thanks    Zach Rosario PA-C  [facility]

## 2019-07-15 ENCOUNTER — MYC MEDICAL ADVICE (OUTPATIENT)
Dept: FAMILY MEDICINE | Facility: OTHER | Age: 29
End: 2019-07-15

## 2019-07-15 ENCOUNTER — ALLIED HEALTH/NURSE VISIT (OUTPATIENT)
Dept: FAMILY MEDICINE | Facility: OTHER | Age: 29
End: 2019-07-15
Payer: MEDICARE

## 2019-07-15 VITALS — SYSTOLIC BLOOD PRESSURE: 128 MMHG | DIASTOLIC BLOOD PRESSURE: 78 MMHG | HEART RATE: 74 BPM

## 2019-07-15 DIAGNOSIS — I10 HYPERTENSION GOAL BP (BLOOD PRESSURE) < 130/80: ICD-10-CM

## 2019-07-15 DIAGNOSIS — I10 HYPERTENSION GOAL BP (BLOOD PRESSURE) < 130/80: Primary | ICD-10-CM

## 2019-07-15 PROCEDURE — 99207 ZZC NO CHARGE NURSE ONLY: CPT

## 2019-07-15 ASSESSMENT — MIFFLIN-ST. JEOR: SCORE: 1476.12

## 2019-07-15 NOTE — PROGRESS NOTES
Katy Islas is a 29 year old patient who comes in today for a Blood Pressure check.  Initial BP:  /78 (BP Location: Left arm, Patient Position: Chair, Cuff Size: Adult Regular)   Pulse 74      74  Disposition: results routed to provider

## 2019-07-15 NOTE — PROGRESS NOTES
Outpatient Physical Therapy Discharge Note     Patient: Katy Islas  : 1990    Beginning/End Dates of Reporting Period:  2019 to 7/15/2019, evaluation only    Referring Provider: Bob Monson PA-C    Therapy Diagnosis: lumbar spine dysfunction, sacroiliac dysfunction. Lumbar radiculopathy of left lower extremity. Impaired balance. Core muscle weakness, bilateral lower extremity weakness, patellar instability bilaterally. Impaired posture     Client Self Report: Patient tolerated today's assessment well. She expressed no increase in pain with assessment once moving, however sitting on the plinth for subjective evaluation resulted in increased pain, relieved only when lying on her back with her knees straight, she reports increased back pain when in hooklying position.     Objective Measurements:  Objective Measure: Symptoms     Objective Measure: Quad control     Objective Measure: lumbar mobility     Objective Measure: HEP         Outcome Measures (most recent score):  Ludwig STarT Sub-Score (Q5-9): 5  Ludwig STarT Total Score (all 9): 8  Oswestry Score (%): 44 %    Goals:  Goal Identifier SLR flexion   Goal Description Patient will demonstrate ability to complete straight leg raise into flexion without extensor lag or increased back pain as evidence of improved muscular stabilization to progress towards higher level of strengthening.   Target Date 19   Date Met      Progress:     Goal Identifier Balance   Goal Description Patient will demonstrate ability to hold single limb stance on land bilaterally for 15 seconds as evidence of improved core stabilization and lower quarter strength.   Target Date 05/10/19   Date Met      Progress:     Goal Identifier Activity tolerance   Goal Description Patient will tolerate 15 minutes of continuous cardiovascular activity with the pool without need for a rest break or increased pain as evidence of improved muscular endurance for progression of program to  community pool for independent weight loss program.   Target Date 05/24/19   Date Met      Progress:     Goal Identifier Radiculopathy   Goal Description Patient will report resolution of left leg radicular symptoms with her daily routine for 1 week as evidence of progress towards lumbar dysfunction resolution in order to progress towards higher level strengthening and activity for improved quality of life.   Target Date 04/26/19   Date Met      Progress:     Progress Toward Goals:   Progress limited due to: Patient not showing for 3 appointments and cancelling 3 times. Patient called and reached twice with patient stating she will attend next appointment however failed to show. On 5/20/19 left message to patient that all future appointments will be cancelled and she may call on a same-day basis to schedule. Patient has failed to schedule any further appointments. Patient has been instructed in an HEP including: Hooklying TA bracing, bracing with pelvic tilts. Bilateral quad sets. Hooklying clamshell with red resistance band. Prone extension/press ups as tolerated, short sitting active hamstring stretch.    Plan:  Discharge from therapy.    Discharge:    Reason for Discharge: Patient chooses to discontinue therapy.  Patient has failed to schedule further appointments.    Equipment Issued: therapy band    Discharge Plan: Patient to continue home program.    Thank you for your referral!    Oneyda Woodruff PT, DPT  [facility]  [phone]

## 2019-07-15 NOTE — ADDENDUM NOTE
Encounter addended by: Oneyda Woodruff, PT on: 7/15/2019 3:42 PM   Actions taken: Sign clinical note, Episode resolved

## 2019-07-15 NOTE — TELEPHONE ENCOUNTER
I can not accommodate patient this week. Can see in 1 week. I will not increase her pain medication any further.     SLEEP EVALUATION & MANAGEMENT REFERRAL - ADULT -[facility] - [place] - [phone] [[phone]]       Increase her Metoprolol to 100 mg. Continue Losartan as is.     Zach Rosario PA-C  [facility] [place]

## 2019-07-16 RX ORDER — METOPROLOL SUCCINATE 100 MG/1
100 TABLET, EXTENDED RELEASE ORAL DAILY
Qty: 30 TABLET | Refills: 1 | Status: SHIPPED | OUTPATIENT
Start: 2019-07-16 | End: 2019-08-05

## 2019-07-16 NOTE — TELEPHONE ENCOUNTER
Patient aware of below plan.     Next 5 appointments (look out 90 days)    Jul 22, 2019  9:30 AM CDT  Office Visit with Aura Rosario PA-C, ASL [facility]  [facility] [address] [place] [id number]  [phone]   Jul 25, 2019  4:00 PM CDT  Office Visit with Aura Rosario PA-C  [facility] [address] [place] [id number]  [phone]        Ines Roy, RN, BSN

## 2019-07-16 NOTE — PROGRESS NOTES
Subjective     Katy Islas is a 29 year old female who presents to clinic today for the following health issues:    HPI     Hypertension Follow-up      Do you check your blood pressure regularly outside of the clinic? Yes     Are you following a low salt diet? Yes    Are your blood pressures ever more than 140 on the top number (systolic) OR more   than 90 on the bottom number (diastolic), for example 140/90? Yes : 160/85    Chronic Pain Follow-Up  Type / Location of Pain: low back and head  Analgesia/pain control:       Recent changes:  Worse- more active       Overall control: Tolerable with discomfort  Activity level/function:      Daily activities:  Can do most things most days, with some rest- 30 min breaks    Work:  not applicable  Adverse effects:  No  Adherance    Taking medication as directed?  Yes    Participating in other treatments: no  Risk Factors:    Sleep:  Good    Mood/anxiety:  worsened    Recent family or social stressors:  none noted    Other aggravating factors: prolonged sitting and prolonged standing     - ER recommends increasing losartan   - Pain clinic recommends pool therapy and injections.  -  at pain clinic says he will call to increase patches.   - Feels like has high tolerance, like this does nothing   - Stopped medical marijuana, too expensive     Thinking about restarting     Dr. Derrick Berg certified her for this ([place])   - Sees Dr. Millan, Friday at 2 pm            PHQ-9 SCORE 4/3/2019 5/13/2019 6/13/2019   PHQ-9 Total Score - - -   PHQ-9 Total Score MyChart 7 (Mild depression) 9 (Mild depression) 7 (Mild depression)   PHQ-9 Total Score 7 9 7     JULIETH-7 SCORE 5/13/2019 6/13/2019 7/22/2019   Total Score - - -   Total Score 13 (moderate anxiety) Incomplete -   Total Score 13 10 10     Encounter-Level CSA - 04/25/2017:    Controlled Substance Agreement - Scan on 5/1/2017 11:06 AM: CONTROLLED SUBSTANCE AGREEMENT (below)       Encounter-Level CSA - 01/12/2015:   "  Controlled Substance Agreement - Scan on 1/26/2015  9:14 AM: Controlled Medication Agreement-01/12/15 (below)       Patient-Level CSA:    Controlled Substance Agreement - Opioid - Scan on 4/24/2019  2:39 PM: Opiod/Opiod Plus Controlled Substance Agreement 04/24/19 (below)           Review of Systems   ROS COMP: Constitutional, HEENT, cardiovascular, pulmonary, gi and gu systems are negative, except as otherwise noted.      Objective    /90   Pulse 94   Temp 98.4  F (36.9  C) (Temporal)   Resp 18   Ht 1.575 m (5' 2\")   Wt 79.6 kg (175 lb 6.4 oz)   SpO2 100%   BMI 32.08 kg/m    Body mass index is 32.08 kg/m .  Physical Exam   GENERAL APPEARANCE: healthy, alert and no distress  EYES: Eyes grossly normal to inspection, PERRLA, conjunctivae and sclerae without injection or discharge, EOM intact   MS: No musculoskeletal defects are noted and gait is age appropriate without ataxia   SKIN: No suspicious lesions or rashes, hydration status appears adeuqate with normal skin turgor   NEURO: Strength 5+ bilateral lower extremities, sensation intact in distal bilateral lower extremities, mentation- intact, speech- normal, reflexes- symmetric in bilateral lower extremities  BACK: No CVA tenderness, no paralumbar tenderness, no midline tenderness, normal ROM   PSYCH: Alert and oriented x3; speech- coherent , normal rate and volume; able to articulate logical thoughts, able to abstract reason, no tangential thoughts, no hallucinations or delusions, mentation appears normal, Mood is euthymic. Affect is appropriate for this mood state and bright. Thought content is free of suicidal ideation, hallucinations, and delusions. Dress is adequate and upkept. Eye contact is good during conversation.       Diagnostic Test Results:  Labs reviewed in Epic  none         Assessment & Plan       ICD-10-CM    1. Chronic pain syndrome G89.4 fentaNYL (DURAGESIC) 25 mcg/hr 72 hr patch   2. Chronic bilateral low back pain with left-sided " sciatica M54.42 fentaNYL (DURAGESIC) 25 mcg/hr 72 hr patch    G89.29    3. Hypertension goal BP (blood pressure) < 130/80 I10 losartan (COZAAR) 100 MG tablet     1. Pain   - [facility] and was able to confirm they saw her last week      Patient called 7/19/19 and wanted increase       Doesn't have contract with them, they will not take over prescribing             Short term might be reasonable was noted in exam from 7/18       [facility] pool therapy recommend      Left L4-5-S1 trans foraminal injection recommended       No scheduled yet, awaiting approval     - As previously, patient with complicated spinal history including L4-5 disectomy (10/19/11), L4-5 fusion (2/23/17), coccyx fracture and removal (complicated by infection - 10/18/17), and SI Joint fusion (10/26/18)   - Records received from [facility] - Dr. 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 Millan [facility]      Had appointment with pain management - [facility] 8/7/19 (see above)      - Received orders from initial evaluation at [facility]      Patient states sore but likes going and will continue          - Patient is on medical marijuana for mood/PTSD issues (following with counseling and psychiatry as well)   - Icing regularly along with stretches from previous physical therapy   - Also taking Gabapentin and Flexeril   -  reviewed   -  CSA - 4/24/19          - As above, patient reports GI says pain medications causing her severe nausea and vomiting, reviewed note and does state that could be contributing, patient states was told to discuss Fentanyl patch, this was not found in GI note   - Last visit started Fentanyl patch 12 mg/72 hours      Not enough      Will increase to 25 mg/72 hours       Reviewed use and side effects including sedation and addiction       Discussed risks of overdose, declined narcan   - Stop all pain medications except for Robaxin and Fentayl and Gabapentin      But if you start to get over-sedated stop Gabapentin and Robaxin and/or take the patch off   - Recheck 2 weeks    2. HTN   - New diagnosis at last visit      Losartan to 50 mg AND start Metoprolol 100 mg once a day in the morning   - Had ED visits and continues to be elevated   - Increase Losartan to 100 mg, continue Metoprolol  - If still elevated, will plan to increase Metoprolol at next visit   - Recheck 2 weeks              The patient indicates understanding of these issues and agrees with the plan.    Return in about 2 weeks (around 8/5/2019).    Due to language barrier, an  was present during the history-taking and subsequent discussion (and for part of the physical exam) with this patient.        Aura Rosario PA-C  [facility]

## 2019-07-17 ENCOUNTER — OFFICE VISIT (OUTPATIENT)
Dept: INTERPRETER SERVICES | Facility: CLINIC | Age: 29
End: 2019-07-17
Payer: MEDICARE

## 2019-07-17 ENCOUNTER — ANESTHESIA (OUTPATIENT)
Dept: SURGERY | Facility: AMBULATORY SURGERY CENTER | Age: 29
End: 2019-07-17
Payer: MEDICARE

## 2019-07-17 ENCOUNTER — ANESTHESIA EVENT (OUTPATIENT)
Dept: SURGERY | Facility: AMBULATORY SURGERY CENTER | Age: 29
End: 2019-07-17

## 2019-07-17 ENCOUNTER — HOSPITAL ENCOUNTER (OUTPATIENT)
Facility: AMBULATORY SURGERY CENTER | Age: 29
Discharge: HOME | End: 2019-07-17
Attending: INTERNAL MEDICINE | Admitting: INTERNAL MEDICINE
Payer: MEDICARE

## 2019-07-17 VITALS
TEMPERATURE: 99.2 F | DIASTOLIC BLOOD PRESSURE: 68 MMHG | SYSTOLIC BLOOD PRESSURE: 111 MMHG | RESPIRATION RATE: 16 BRPM | HEIGHT: 62 IN | OXYGEN SATURATION: 98 % | WEIGHT: 177 LBS | BODY MASS INDEX: 32.57 KG/M2

## 2019-07-17 VITALS — HEART RATE: 115 BPM

## 2019-07-17 LAB
COLONOSCOPY: NORMAL
HCG UR QL: NEGATIVE

## 2019-07-17 PROCEDURE — T1013 SIGN LANG/ORAL INTERPRETER: HCPCS | Mod: U3 | Performed by: INTERNAL MEDICINE

## 2019-07-17 PROCEDURE — 45380 COLONOSCOPY AND BIOPSY: CPT | Mod: XS

## 2019-07-17 PROCEDURE — 81025 URINE PREGNANCY TEST: CPT | Performed by: ANESTHESIOLOGY

## 2019-07-17 PROCEDURE — 45385 COLONOSCOPY W/LESION REMOVAL: CPT

## 2019-07-17 PROCEDURE — G8918 PT W/O PREOP ORDER IV AB PRO: HCPCS

## 2019-07-17 PROCEDURE — G8907 PT DOC NO EVENTS ON DISCHARG: HCPCS

## 2019-07-17 RX ORDER — ONDANSETRON 2 MG/ML
4 INJECTION INTRAMUSCULAR; INTRAVENOUS
Status: DISCONTINUED | OUTPATIENT
Start: 2019-07-17 | End: 2019-07-18 | Stop reason: HOSPADM

## 2019-07-17 RX ORDER — PROPOFOL 10 MG/ML
INJECTION, EMULSION INTRAVENOUS CONTINUOUS PRN
Status: DISCONTINUED | OUTPATIENT
Start: 2019-07-17 | End: 2019-07-17

## 2019-07-17 RX ORDER — LIDOCAINE 40 MG/G
CREAM TOPICAL
Status: DISCONTINUED | OUTPATIENT
Start: 2019-07-17 | End: 2019-07-18 | Stop reason: HOSPADM

## 2019-07-17 RX ORDER — SODIUM CHLORIDE, SODIUM LACTATE, POTASSIUM CHLORIDE, CALCIUM CHLORIDE 600; 310; 30; 20 MG/100ML; MG/100ML; MG/100ML; MG/100ML
INJECTION, SOLUTION INTRAVENOUS CONTINUOUS PRN
Status: DISCONTINUED | OUTPATIENT
Start: 2019-07-17 | End: 2019-07-17

## 2019-07-17 RX ORDER — LIDOCAINE HYDROCHLORIDE 20 MG/ML
INJECTION, SOLUTION INFILTRATION; PERINEURAL PRN
Status: DISCONTINUED | OUTPATIENT
Start: 2019-07-17 | End: 2019-07-17

## 2019-07-17 RX ORDER — PROPOFOL 10 MG/ML
INJECTION, EMULSION INTRAVENOUS PRN
Status: DISCONTINUED | OUTPATIENT
Start: 2019-07-17 | End: 2019-07-17

## 2019-07-17 RX ADMIN — PROPOFOL 50 MG: 10 INJECTION, EMULSION INTRAVENOUS at 14:58

## 2019-07-17 RX ADMIN — PROPOFOL 50 MG: 10 INJECTION, EMULSION INTRAVENOUS at 14:50

## 2019-07-17 RX ADMIN — PROPOFOL 150 MCG/KG/MIN: 10 INJECTION, EMULSION INTRAVENOUS at 14:52

## 2019-07-17 RX ADMIN — PROPOFOL 50 MG: 10 INJECTION, EMULSION INTRAVENOUS at 14:51

## 2019-07-17 RX ADMIN — PROPOFOL 50 MG: 10 INJECTION, EMULSION INTRAVENOUS at 15:09

## 2019-07-17 RX ADMIN — PROPOFOL 50 MG: 10 INJECTION, EMULSION INTRAVENOUS at 14:54

## 2019-07-17 RX ADMIN — LIDOCAINE HYDROCHLORIDE 100 MG: 20 INJECTION, SOLUTION INFILTRATION; PERINEURAL at 14:49

## 2019-07-17 RX ADMIN — SODIUM CHLORIDE, SODIUM LACTATE, POTASSIUM CHLORIDE, CALCIUM CHLORIDE: 600; 310; 30; 20 INJECTION, SOLUTION INTRAVENOUS at 14:42

## 2019-07-17 RX ADMIN — PROPOFOL 100 MG: 10 INJECTION, EMULSION INTRAVENOUS at 14:49

## 2019-07-17 ASSESSMENT — ENCOUNTER SYMPTOMS: SEIZURES: 1

## 2019-07-17 NOTE — H&P
Pre-Op H&P    CC/REFERRING MD:    Aura Rosario      HISTORY OF PRESENT ILLNESS:    Katy Islas is 29 year old female who presents for evaluation of abnormal CT scan with thickening of the transverse and descending colon.  She also has intermittent hematochezia.  She has a history of midazolam allergy.  She has tolerated MAC in the past.  No changes in health.  Asthma under good control.          PROBLEM LIST  Patient Active Problem List    Diagnosis Date Noted     Bulge of cervical disc without myelopathy 04/24/2019     Priority: Medium     Cervicalgia 04/17/2019     Priority: Medium     Class 1 obesity due to excess calories without serious comorbidity with body mass index (BMI) of 32.0 to 32.9 in adult 04/03/2019     Priority: Medium     Hypophosphatemia 11/01/2018     Priority: Medium     Patellar maltracking, right 09/05/2018     Priority: Medium     Right anterior knee pain 09/05/2018     Priority: Medium     Melanocytic nevus, unspecified location 07/23/2018     Priority: Medium     Dysplastic skin lesion 07/23/2018     Priority: Medium     Iliotibial band syndrome affecting lower leg, right 12/21/2017     Priority: Medium     Chondromalacia of right patellofemoral joint 12/21/2017     Priority: Medium     Upper GI bleed - suspected 07/01/2017     Priority: Medium     Tobacco use disorder 07/01/2017     Priority: Medium     History of pseudoseizure 07/01/2017     Priority: Medium     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) 03/10/2017     Priority: Medium     Lumbar disc disease with radiculopathy 02/23/2017     Priority: Medium     S/P lumbar fusion 02/23/2017     Priority: Medium     Dr. YOVANI Millan, 2/23/17       Vitamin D deficiency 01/06/2017     Priority: Medium     Atypical mole 01/06/2017     Priority: Medium     Mild persistent asthma without complication 12/02/2016     Priority: Medium     Chronic bilateral low back pain with left-sided sciatica 12/02/2016      Priority: Medium     Bee sting allergy 09/16/2016     Priority: Medium     Gastroesophageal reflux disease without esophagitis 08/26/2016     Priority: Medium     Herpes simplex virus infection 11/12/2015     Priority: Medium     Adjustment disorder with mixed anxiety and depressed mood 10/14/2015     Priority: Medium     Marijuana abuse 02/22/2015     Priority: Medium     Bipolar disorder (H) 01/31/2013     Priority: Medium     Problem list name updated by automated process. Provider to review       Chronic pain 02/09/2012     Priority: Medium     Patient is followed by Aura Rosario PA-C for ongoing prescription of pain medication.  All refills should only be approved by this provider, or covering partner.    Medication(s): Norco    Maximum quantity per month: 70  Clinic visit frequency required: Q 2 months     Controlled substance agreement:   Discussed and signed 4/25/17    Encounter-Level CSA - 01/12/2015:                 Controlled Substance Agreement - Scan on 1/26/2015  9:14 AM : Controlled Medication Agreement-01/12/15 (below)            Pain Clinic evaluation in the past: Yes       Date/Location:   [facility], fall 2016    DIRE Total Score(s):    4/25/2017   Total Score 17       Last [facility] website verification:  done on 4/25/17 by LOVE Maki   [email or web address]           Anxiety 09/22/2011     Priority: Medium     Mild major depression (H) 08/29/2011     Priority: Medium     Mild intermittent asthma 10/12/2009     Priority: Medium     Overview:   Formatting of this note might be different from the original.  Action plan: See letter 4/10/2013   ATAQ:   Asthma Data 4/10/2013   Date completed 4/10/2013   Missed daily activities no = 0   Wake at night no = 0   Believe asthma in control yes = 0   ARIN use yes   Maximum ARIN use in 1 day 1-4 = 0   ATAQ score 0 = well controlled   Asthma ED visits in past 12 mos 0   Asthma hospitalizations in past 12 mos 0     Current  Outpatient Prescriptions   Medication Sig     albuterol (PROVENTIL) 0.083 % neb solution Inhale 3 mL via a nebulizer every 4 hours if needed for Shortness Of Breath.        Crohn's colitis (H) 08/04/2009     Priority: Medium     Dysmenorrhea 05/11/2007     Priority: Medium     Benign neoplasm of skin of lower limb, including hip 03/16/2004     Priority: Medium     Migraine      Priority: Medium     Dr. Farrar - Neurology.  Now on Inderal.  Seems to be working.  Follow-up 9/08  Problem list name updated by automated process. Provider to review       Hearing loss      Priority: Medium     Congenital  Problem list name updated by automated process. Provider to review       Allergic rhinitis      Priority: Medium     Problem list name updated by automated process. Provider to review         PERTINENT PAST MEDICAL HISTORY:  (I personally reviewed this history with the patient at today's visit)   Past Medical History:   Diagnosis Date     Abdominal pain 10/31- 11/4/2005    [facility] admit for Crohn's     Allergic rhinitis, cause unspecified     Allergic rhinitis     Arthritis      C. difficile diarrhea     Past, no current diarrhea.     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, without mention of intractable epilepsy     pseudoseizures diagnosed after extensive neurologic eval     Migraine 07/21/12    D/C 07/22/12-[facility]     Migraine, unspecified, without mention of intractable migraine without mention of status migrainosus     Migraine     Mild intermittent  asthma     mild intermittent     Mycoplasma infection in conditions classified elsewhere and of unspecified site      Other chronic pain     Back pain for 6 years     Renal disease     Kidney stones     Unspecified hearing loss     congenital hearing loss         PREVIOUS SURGERIES: (I personally reviewed this history with the patient at today's visit)   Past Surgical History:   Procedure Laterality Date     AS REMOVAL OF COCCYX  10/18/2017     C FUSION OF SACROILIAC JOINT  10/26/2018     COLONOSCOPY  7/1/2009    [facility].     COMBINED ESOPHAGOSCOPY, GASTROSCOPY, DUODENOSCOPY (EGD) WITH CO2 INSUFFLATION N/A 4/12/2019    Procedure: COMBINED ESOPHAGOSCOPY, GASTROSCOPY, DUODENOSCOPY (EGD) WITH CO2 INSUFFLATION;  Surgeon: Edwin Conde MD;  Location: [facility] OR     ESOPHAGOSCOPY, GASTROSCOPY, DUODENOSCOPY (EGD), COMBINED N/A 4/12/2019    Procedure: Combined Esophagoscopy, Gastroscopy, Duodenoscopy (Egd), Biopsy Single Or Multiple;  Surgeon: Edwin Conde MD;  Location: [facility] OR     FUSION SPINE POSTERIOR MINIMALLY INVASIVE ONE LEVEL N/A 2/23/2017    Procedure: FUSION SPINE POSTERIOR MINIMALLY INVASIVE ONE LEVEL;  L4-5 Oblique Lateral Lumbar Interbody Fusion   Epidural steroid injection.   Transpedicular Bone marrow aspiration;  Surgeon: Jeniffer Eugene MD;  Location: [facility] OR     HC COLONOSCOPY THRU STOMA WITH BIOPSY  10/29/2003    Impression is that of normal appearing colonoscopy, without evidence of rectal bleeding.     HC COLONOSCOPY THRU STOMA, DIAGNOSTIC  10/00    normal     HC COLONOSCOPY THRU STOMA, DIAGNOSTIC  Oct 2009    Dr López- normal     HC EEG AWAKE AND SLEEP      abnormal     HC MRI BRAIN W/O CONTRAST  12/00    normal     HC REMOVAL GALLBLADDER  8/5/2009    [facility].     HC UGI ENDOSCOPY DIAG W BIOPSY  11/11/09    Normal esophagus     HC UGI ENDOSCOPY, SIMPLE EXAM  7/00, 10/00    mild chronic esophagitis and duodenitis, neg H pylori     HC  UGI ENDOSCOPY, SIMPLE EXAM  01/20/2005    Esophagogastroduodenoscopy, colonoscopy with biopsies.  [facility] UGI ENDOSCOPY, SIMPLE EXAM  7/1/2009    [facility] [place].      UGI ENDOSCOPY, SIMPLE EXAM  11/11/2009    attempted upper GI, pt. could not tolerate procedure:[facility]     ORTHOPEDIC SURGERY  October 19,2011    diskectomy L4-L5         ALLERGIES:     Allergies   Allergen Reactions     Iohexol Hives     Other reaction(s): Hives  IV contrast; patient states she tolerates contrast if she gets benadryl before  IV contrast; patient states she tolerates contrast if she gets benadryl before     Ativan [Lorazepam] Hives     At the IV site     Baclofen      hives     Bees Hives, Swelling and Difficulty breathing     Caffeine      Contrast Dye      Hives,   Updated 5/10/2016 CT Contrast.     Iodine Hives     Methocarbamol Swelling     Metoclopramide      Other reaction(s): Tremors  LW Reaction: shaking/sweating     Midazolam      Other reaction(s): Agitation     Monosodium Glutamate      Morphine Other (See Comments)     Difficulty with urination     Nsaids Other (See Comments)     GI BLEED x2     Other (Do Not Use) Other (See Comments)     Xanaflex- pt becomes disoriented and loses bladder control     Reglan [Metoclopramide Hcl] Other (See Comments)     shaking     Soma [Carisoprodol] Visual Disturbance     Sleep walking     Tizanidine      Topamax Other (See Comments)     Topamax [Topiramate] Nausea     Tingling  GI/Vomit     Tramadol      Severe Headache, Seizure Risk     Tylenol W/Codeine [Acetaminophen-Codeine] Nausea and Itching     Tylenol 3     Valium Other (See Comments)     Becomes aggressive and angry     Versed Other (See Comments)     Zolmitriptan      Makes face feel like its twitching     Droperidol Anxiety     Flu Virus Vaccine Rash      Arm swelling       PERTINENT MEDICATIONS:    Current Outpatient Medications:      acetaminophen (TYLENOL) 500 MG  tablet, Take 2 tablets (1,000 mg) by mouth 3 times daily as needed for mild pain, Disp: 100 tablet, Rfl: 0     ARIPiprazole (ABILIFY) 15 MG tablet, Take 1 tablet (15 mg) by mouth At Bedtime, Disp: 90 tablet, Rfl: 3     busPIRone (BUSPAR) 15 MG tablet, Take 1 tablet (15 mg) by mouth 2 times daily, Disp: 60 tablet, Rfl: 5     dicyclomine (BENTYL) 20 MG tablet, Take 1 tablet (20 mg) by mouth 4 times daily as needed (ABDOMINAL CRAMPING) AS NEEDED FOR CRAMPING, Disp: 60 tablet, Rfl: 0     diphenhydrAMINE (BENADRYL) 50 MG capsule, Take 1-2 capsules ( mg) by mouth every 6 hours as needed for itching, allergies or sleep, Disp: 90 capsule, Rfl: 3     DULoxetine (CYMBALTA) 60 MG EC capsule, Take 1 capsule (60 mg) by mouth daily, Disp: 90 capsule, Rfl: 3     fentaNYL (DURAGESIC) 12 mcg/hr 72 hr patch, Place 1 patch onto the skin every 72 hours remove old patch., Disp: 10 patch, Rfl: 0     gabapentin (NEURONTIN) 300 MG capsule, Take 1-3 capsules (300-900 mg) by mouth 3 times daily, Disp: 270 capsule, Rfl: 5     levocetirizine (XYZAL) 5 MG tablet, Take 1 tablet (5 mg) by mouth every evening, Disp: 90 tablet, Rfl: 3     losartan (COZAAR) 50 MG tablet, Take 1 tablet (50 mg) by mouth daily, Disp: 30 tablet, Rfl: 3     methocarbamol (ROBAXIN) 750 MG tablet, Take 1 tablet (750 mg) by mouth 4 times daily as needed for muscle spasms, Disp: 60 tablet, Rfl: 1     metoprolol succinate ER (TOPROL-XL) 100 MG 24 hr tablet, Take 1 tablet (100 mg) by mouth daily, Disp: 30 tablet, Rfl: 1     nicotine (NICODERM CQ) 14 MG/24HR 24 hr patch, Place 1 patch onto the skin every 24 hours, Disp: 21 patch, Rfl: 3     NONFORMULARY, Apply 30 mLs topically 4 times daily, Disp: , Rfl:      pantoprazole (PROTONIX) 40 MG EC tablet, Take 1 tablet (40 mg) by mouth 2 times daily Take 30-60 minutes before a meal., Disp: 180 tablet, Rfl: 3     prazosin (MINIPRESS) 1 MG capsule, Take 1 mg by mouth At Bedtime, Disp: , Rfl: 5     prochlorperazine (COMPAZINE) 10  MG tablet, Take 1 tablet (10 mg) by mouth every 6 hours as needed for nausea or vomiting, Disp: 20 tablet, Rfl: 0     prochlorperazine (COMPAZINE) 25 MG suppository, Place 1 suppository (25 mg) rectally every 12 hours as needed for nausea, Disp: 12 suppository, Rfl: 3     sucralfate (CARAFATE) 1 GM tablet, Take 1 tablet (1 g) by mouth 4 times daily as needed (heartburn), Disp: 360 tablet, Rfl: 3     albuterol (2.5 MG/3ML) 0.083% neb solution, Take 1 vial (2.5 mg) by nebulization every 6 hours as needed for shortness of breath / dyspnea or wheezing, Disp: 50 vial, Rfl: 11     albuterol (PROAIR HFA/PROVENTIL HFA/VENTOLIN HFA) 108 (90 Base) MCG/ACT Inhaler, Inhale 2 puffs into the lungs every 6 hours as needed for shortness of breath / dyspnea or wheezing, Disp: 3 Inhaler, Rfl: 3     aspirin (ASA) 81 MG tablet, Take 1 tablet (81 mg) by mouth daily, Disp: 90 tablet, Rfl: 3     clonazePAM (KLONOPIN) 0.5 MG tablet, TK 1/2 TO 1 T D PRA, Disp: , Rfl: 5     cyclobenzaprine (FLEXERIL) 10 MG tablet, Take 1 tablet (10 mg) by mouth 2 times daily as needed for muscle spasms or other (back pain) (Patient not taking: Reported on 7/8/2019), Disp: 60 tablet, Rfl: 0     EPINEPHrine (EPIPEN/ADRENACLICK/OR ANY BX GENERIC EQUIV) 0.3 MG/0.3ML injection 2-pack, Inject 0.3 mLs (0.3 mg) into the muscle once as needed for anaphylaxis (Patient not taking: Reported on 7/8/2019), Disp: 0.6 mL, Rfl: 3     medical cannabis (Patient's own supply), (The purpose of this order is to document that the patient reports taking medical cannabis.  This is not a prescription, and is not used to certify that the patient has a qualifying medical condition.), Disp: 0 Information only, Rfl: 0     medroxyPROGESTERone (DEPO-PROVERA) 150 MG/ML IM injection, Inject 1 mL (150 mg) into the muscle every 3 months, Disp: 3 mL, Rfl: 3     mupirocin (BACTROBAN) 2 % external ointment, Apply topically 3 times daily, Disp: , Rfl:      nystatin (MYCOSTATIN) cream, nystatin  100,000 unit/gram topical cream, Disp: , Rfl:      rizatriptan (MAXALT-MLT) 5 MG ODT tab, Take 1-2 tablets (5-10 mg) by mouth at onset of headache for migraine May repeat in 2 hours. Max 6 tablets/24 hours., Disp: 18 tablet, Rfl: 3    Current Facility-Administered Medications:      lidocaine (LMX4) kit, , Topical, Q1H PRN, Edwin Conde MD     lidocaine 1 % 0.1-1 mL, 0.1-1 mL, Other, Q1H PRN, Edwin Conde MD     ondansetron (ZOFRAN) injection 4 mg, 4 mg, Intravenous, Once PRN, Edwin Conde MD     sodium chloride (PF) 0.9% PF flush 3 mL, 3 mL, Intracatheter, q1 min prn, Edwin Conde MD     sodium chloride (PF) 0.9% PF flush 3 mL, 3 mL, Intracatheter, Q8H, Edwin Conde MD, 3 mL at 07/17/19 1329    SOCIAL HISTORY:  Social History     Socioeconomic History     Marital status: Single     Spouse name: Not on file     Number of children: Not on file     Years of education: Not on file     Highest education level: Not on file   Occupational History     Not on file   Social Needs     Financial resource strain: Not on file     Food insecurity:     Worry: Not on file     Inability: Not on file     Transportation needs:     Medical: Not on file     Non-medical: Not on file   Tobacco Use     Smoking status: Current Every Day Smoker     Packs/day: 0.50     Years: 5.00     Pack years: 2.50     Types: Cigarettes     Smokeless tobacco: Never Used   Substance and Sexual Activity     Alcohol use: Not Currently     Comment: Not since last July     Drug use: Not Currently     Types: Marijuana     Comment: Medical Marijuana currently-- More CDB : quit to expensive      Sexual activity: Not Currently     Partners: Male     Birth control/protection: Condom, Injection   Lifestyle     Physical activity:     Days per week: Not on file     Minutes per session: Not on file     Stress: Not on file   Relationships     Social connections:     Talks on phone: Not on file      Gets together: Not on file     Attends Taoist service: Not on file     Active member of club or organization: Not on file     Attends meetings of clubs or organizations: Not on file     Relationship status: Not on file     Intimate partner violence:     Fear of current or ex partner: Not on file     Emotionally abused: Not on file     Physically abused: Not on file     Forced sexual activity: Not on file   Other Topics Concern      Service No     Blood Transfusions No     Caffeine Concern No     Occupational Exposure No     Hobby Hazards No     Sleep Concern No     Stress Concern No     Weight Concern No     Special Diet No     Back Care No     Exercise Yes     Bike Helmet No     Seat Belt Yes     Self-Exams Yes     Parent/sibling w/ CABG, MI or angioplasty before 65F 55M? Yes     Comment: father late 30's early 40's    Social History Narrative     Not on file       FAMILY HISTORY: (I personally reviewed this history with the patient at today's visit)  Family History   Problem Relation Age of Onset     Gastrointestinal Disease Brother         severe Crohn's     Neurologic Disorder Brother         Seizures post head injury     Depression Brother      Substance Abuse Brother      Genitourinary Problems Father         kidney stones     Diabetes Father      Heart Disease Father         Open heart surgery     Breast Cancer Maternal Grandmother      Parkinsonism Maternal Grandmother      Cerebrovascular Disease Paternal Grandmother      Cancer Maternal Grandfather         Lung     Cardiovascular Paternal Grandfather         Heart Attack     Substance Abuse Mother         in recovery x1 year      Lung Cancer Paternal Uncle      Cancer Paternal Uncle      Lung Cancer Maternal Uncle         ROS:    No fevers or chills  No weight loss  No blurry vision, double vision or change in vision  No sore throat  No lymphadenopathy  No headache, paraesthesias, or weakness in a limb  No shortness of breath or wheezing  No  "chest pain or pressure  No arthralgias or myalgias  No rashes or skin changes  No odynophagia or dysphagia  No BRBPR, hematochezia, melena  No dysuria, frequency or urgency  No hot/cold intolerance or polyria  No anxiety or depression  PHYSICAL EXAMINATION:  Constitutional: aaox3, cooperative, pleasant, not dyspneic/diaphoretic, no acute distress  Vitals reviewed: /82   Temp 99.2  F (37.3  C) (Temporal)   Resp 18   Ht 1.575 m (5' 2\")   Wt 80.3 kg (177 lb)   LMP 05/13/2019   SpO2 97%   BMI 32.37 kg/m    Wt:   Wt Readings from Last 2 Encounters:   07/15/19 80.3 kg (177 lb)   07/08/19 80.3 kg (177 lb)      Eyes: Sclera anicteric/injected  Ears/nose/mouth/throat: Normal oropharynx without ulcers or exudate, mucus membranes moist, hearing intact  Neck: supple, thyroid normal size  CV: No edema, RRR  Respiratory: Unlabored breathing, CTAB  Lymph: No submandibular, supraclavicular or inguinal lymphadenopathy  Abd: Nondistended, no masses, +bs, no hepatosplenomegaly, nontender, no peritoneal signs  Skin: warm, perfused, no jaundice  Psych: Normal affect  MSK: Normal gait      PERTINENT STUDIES: (I personally reviewed these laboratory studies today)  Most recent CBC:   Recent Labs   Lab Test 06/29/19  1802 06/22/19  1736   WBC 6.1 6.7   HGB 14.2 12.5   HCT 41.4 38.4    251     Most recent hepatic panel:  Recent Labs   Lab Test 06/29/19  1802 06/22/19  1736   ALT 23 23   AST 15 7     Most recent creatinine:  Recent Labs   Lab Test 06/29/19  1802 06/22/19  1736   CR 0.94 0.68         ASSESSMENT/PLAN:    Katy Islas is a 29 year old female who presents for diagnostic colonoscopy.  Asthma and blood pressure under good control.  No cardiac issues.  Recommend proceeding with MAC without further evaluation.    This note was created with voice recognition software, and while reviewed for accuracy, typos may remain.     Edwin Conde MD  Adjunct  of Medicine  Division of " Gastroenterology, Hepatology and Nutrition  [facility]  [phone]

## 2019-07-17 NOTE — ANESTHESIA POSTPROCEDURE EVALUATION
Anesthesia POST Procedure Evaluation    Patient: Katy Islas   MRN:     [id number] Gender:   female   Age:    29 year old :      1990        Preoperative Diagnosis: Diarrhea, unspecified type [R19.7];Colitis [K52.9]  Referred by Junaid Pritchett  BMI = 33.11  [facility] [place] [phone]  **Writer called  Services to schedule an  for procedure   Procedure(s):  COLONOSCOPY, WITH CO2 INSUFFLATION  Colonoscopy, With Polypectomy And Biopsy   Postop Comments: No value filed.       Anesthesia Type:  MAC  MAC    Reportable Event: NO     PAIN: Uncomplicated   Sign Out status: Comfortable, Well controlled pain     PONV: No PONV   Sign Out status:  No Nausea or Vomiting     Neuro/Psych: Uneventful perioperative course   Sign Out Status: Preoperative baseline; Age appropriate mentation     Airway/Resp.: Uneventful perioperative course   Sign Out Status: Non labored breathing, age appropriate RR; Resp. Status within EXPECTED Parameters     CV: Uneventful perioperative course   Sign Out status: Appropriate BP and perfusion indices; Appropriate HR/Rhythm     Disposition:   Sign Out in:  PACU  Disposition:  Phase II; Home  Recovery Course: Uneventful  Follow-Up: Not required           Last Anesthesia Record Vitals:  [facility] VITALS  2019 1448 - 2019 1547      2019             EKG:  Sinus rhythm          Last PACU Vitals:  Vitals Value Taken Time   BP     Temp     Pulse 115 2019  3:15 PM   Resp     SpO2     Temp src     NIBP 123/76 2019  3:15 PM   Pulse 27 2019  3:17 PM   SpO2 97 % 2019  3:17 PM   Resp     Temp     Ht Rate 118 2019  3:16 PM   Temp 2           Electronically Signed By: Matti Ramírez MD, 2019, 3:47 PM

## 2019-07-17 NOTE — ANESTHESIA PREPROCEDURE EVALUATION
Anesthesia Pre-Procedure Evaluation    Patient: Katy Islas   MRN:     [id number] Gender:   female   Age:    29 year old :      1990        Preoperative Diagnosis: Diarrhea, unspecified type [R19.7];Colitis [K52.9]  Referred by Junaid Pritchett  BMI = 33.11  [facility] [place] [phone]  **Writer called  Services to schedule an  for procedure   Procedure(s):  COLONOSCOPY, WITH CO2 INSUFFLATION  Colonoscopy, With Polypectomy And Biopsy     Past Medical History:   Diagnosis Date     Abdominal pain 10/31- 2005    [facility] admit for [facility]     Allergic rhinitis, cause unspecified     Allergic rhinitis     Arthritis      C. difficile diarrhea     Past, no current diarrhea.     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, without mention of intractable epilepsy     pseudoseizures diagnosed after extensive neurologic eval     Migraine 12    D/C 12-[facility]     Migraine, unspecified, without mention of intractable migraine without mention of status migrainosus     Migraine     Mild intermittent asthma     mild intermittent     Mycoplasma infection in conditions classified elsewhere and of unspecified site      Other chronic pain     Back pain for 6 years     Renal disease     Kidney stones     Unspecified hearing loss     congenital hearing loss      Past Surgical History:   Procedure Laterality Date     AS REMOVAL OF COCCYX  10/18/2017     C FUSION OF SACROILIAC JOINT  10/26/2018     COLONOSCOPY   7/1/2009    [facility], [facility].     COMBINED ESOPHAGOSCOPY, GASTROSCOPY, DUODENOSCOPY (EGD) WITH CO2 INSUFFLATION N/A 4/12/2019    Procedure: COMBINED ESOPHAGOSCOPY, GASTROSCOPY, DUODENOSCOPY (EGD) WITH CO2 INSUFFLATION;  Surgeon: Edwin Conde MD;  Location: [facility] OR     ESOPHAGOSCOPY, GASTROSCOPY, DUODENOSCOPY (EGD), COMBINED N/A 4/12/2019    Procedure: Combined Esophagoscopy, Gastroscopy, Duodenoscopy (Egd), Biopsy Single Or Multiple;  Surgeon: Edwin Conde MD;  Location: [facility] OR     FUSION SPINE POSTERIOR MINIMALLY INVASIVE ONE LEVEL N/A 2/23/2017    Procedure: FUSION SPINE POSTERIOR MINIMALLY INVASIVE ONE LEVEL;  L4-5 Oblique Lateral Lumbar Interbody Fusion   Epidural steroid injection.   Transpedicular Bone marrow aspiration;  Surgeon: Jeniffer Eugene MD;  Location: [facility] OR     HC COLONOSCOPY THRU STOMA WITH BIOPSY  10/29/2003    Impression is that of normal appearing colonoscopy, without evidence of rectal bleeding.     HC COLONOSCOPY THRU STOMA, DIAGNOSTIC  10/00    normal     HC COLONOSCOPY THRU STOMA, DIAGNOSTIC  Oct 2009    Dr López- normal     HC EEG AWAKE AND SLEEP      abnormal     HC MRI BRAIN W/O CONTRAST  12/00    normal     HC REMOVAL GALLBLADDER  8/5/2009    [facility], [facility].     HC UGI ENDOSCOPY DIAG W BIOPSY  11/11/09    Normal esophagus     HC UGI ENDOSCOPY, SIMPLE EXAM  7/00, 10/00    mild chronic esophagitis and duodenitis, neg H pylori     HC UGI ENDOSCOPY, SIMPLE EXAM  01/20/2005    Esophagogastroduodenoscopy, colonoscopy with biopsies.  [facility]  7/1/2009    [facility], [facility].     HC UGI ENDOSCOPY, SIMPLE EXAM  11/11/2009    attempted upper GI, pt. could not tolerate procedure:[facility]     ORTHOPEDIC SURGERY  October 19,2011    diskectomy L4-L5          Anesthesia Evaluation     .             ROS/MED HX    ENT/Pulmonary: Comment: Deaf - neg pulmonary ROS    (+)Intermittent asthma , . .    Neurologic:  - neg neurologic ROS   (+)seizures     Cardiovascular:  - neg cardiovascular ROS   (+) hypertension----. : . . . :. .       METS/Exercise Tolerance:     Hematologic:  - neg hematologic  ROS       Musculoskeletal:  - neg musculoskeletal ROS       GI/Hepatic:  - neg GI/hepatic ROS   (+) GERD Inflammatory bowel disease,       Renal/Genitourinary:  - ROS Renal section negative       Endo:  - neg endo ROS   (+) Obesity, .      Psychiatric:  - neg psychiatric ROS   (+) psychiatric history anxiety and depression      Infectious Disease:  - neg infectious disease ROS       Malignancy:      - no malignancy   Other:    (+) H/O Chronic Pain,  - neg other ROS                     PHYSICAL EXAM:   Mental Status/Neuro: A/A/O   Airway: Facies: Feasible  Mallampati: I  Mouth/Opening: Full  TM distance: > 6 cm  Neck ROM: Full   Respiratory: Auscultation: CTAB     Resp. Rate: Normal     Resp. Effort: Normal      CV: Rhythm: Regular  Rate: Age appropriate  Heart: Normal Sounds   Comments:      Dental: Normal                  Lab Results   Component Value Date    WBC 6.1 06/29/2019    HGB 14.2 06/29/2019    HCT 41.4 06/29/2019     06/29/2019    CRP <2.9 04/03/2019    SED 34 (H) 11/20/2018     06/29/2019    POTASSIUM 3.9 06/29/2019    CHLORIDE 104 06/29/2019    CO2 30 06/29/2019    BUN 15 06/29/2019    CR 0.94 06/29/2019     (H) 06/29/2019    SARANYA 9.6 06/29/2019    PHOS 4.5 11/19/2018    MAG 1.7 05/18/2005    ALBUMIN 4.0 06/29/2019    PROTTOTAL 7.5 06/29/2019    ALT 23 06/29/2019    AST 15 06/29/2019    ALKPHOS 110 06/29/2019    BILITOTAL 0.5 06/29/2019    LIPASE 70 (L) 06/29/2019    AMYLASE 88 04/26/2004    PTT 44 (H) 10/12/2017    INR 1.08 06/22/2019    TSH 1.66 07/08/2019    T4 1.71 06/21/2012    HCG Negative 07/17/2019    HCGS Negative 02/23/2017       Preop Vitals  BP Readings from Last 3 Encounters:   07/17/19 128/84   07/15/19 128/78   07/08/19 (!) 138/104    Pulse  "Readings from Last 3 Encounters:   07/17/19 115   07/15/19 74   07/08/19 120      Resp Readings from Last 3 Encounters:   07/17/19 16   07/08/19 16   06/29/19 16    SpO2 Readings from Last 3 Encounters:   07/17/19 98%   07/08/19 98%   07/03/19 100%      Temp Readings from Last 1 Encounters:   07/17/19 37.3  C (99.2  F) (Temporal)    Ht Readings from Last 1 Encounters:   07/15/19 1.575 m (5' 2\")      Wt Readings from Last 1 Encounters:   07/15/19 80.3 kg (177 lb)    Estimated body mass index is 32.37 kg/m  as calculated from the following:    Height as of this encounter: 1.575 m (5' 2\").    Weight as of this encounter: 80.3 kg (177 lb).     LDA:  Peripheral IV 07/17/19 Right (Active)   Site Assessment WDL 7/17/2019  1:28 PM   Line Status Saline locked 7/17/2019  1:28 PM   Phlebitis Scale 0-->no symptoms 7/17/2019  1:28 PM   Infiltration Scale 0 7/17/2019  1:28 PM   Infiltration Site Treatment Method  None 7/17/2019  1:28 PM   Extravasation? No 7/17/2019  1:28 PM   Dressing Intervention New dressing  7/17/2019  1:28 PM   Number of days: 0            Assessment:   ASA SCORE: 3    NPO Status: > 6 hours since completed [employer]   Documentation: H&P complete; Preop Testing complete; Consents complete   Proceeding: Proceed without further delay  Tobacco Use:  NO Active use of Tobacco/UNKNOWN Tobacco use status     Plan:   Anes. Type:  MAC   Pre-Induction: Midazolam IV   Induction:  IV (Standard)   Airway: Native Airway   Access/Monitoring: PIV   Maintenance: Propofol; IV   Emergence: Procedure Site   Logistics: Same Day Surgery     Postop Pain/Sedation Strategy:  Standard-Options: Opioids PRN     PONV Management:  Adult Risk Factors: Female, Non-Smoker, Postop Opioids  Prevention: Propofol Infusion; Ondansetron     CONSENT: Direct conversation   Plan and risks discussed with: Patient   Blood Products: Consent Deferred (Minimal Blood Loss)                         Matti Ramírez MD  "

## 2019-07-17 NOTE — ANESTHESIA CARE TRANSFER NOTE
Patient: Katy Islas    Procedure(s):  COLONOSCOPY, WITH CO2 INSUFFLATION    Diagnosis: Diarrhea, unspecified type [R19.7];Colitis [K52.9]  Referred by Junaid Pritchett  BMI = 33.11  [facility] [phone]  **Writer called  Services to schedule an  for procedure  Diagnosis Additional Information: No value filed.    Anesthesia Type:   MAC     Note:  Airway :Room Air  Patient transferred to:Phase II  Handoff Report: Identifed the Patient, Identified the Reponsible Provider, Reviewed the pertinent medical history, Discussed the surgical course, Reviewed Intra-OP anesthesia mangement and issues during anesthesia, Set expectations for post-procedure period and Allowed opportunity for questions and acknowledgement of understanding      Vitals: (Last set prior to Anesthesia Care Transfer)    CRNA VITALS  7/17/2019 1448 - 7/17/2019 1522      7/17/2019             EKG:  Sinus rhythm                Electronically Signed By: NATA Stockton CRNA  July 17, 2019  3:22 PM

## 2019-07-18 ENCOUNTER — MYC MEDICAL ADVICE (OUTPATIENT)
Dept: FAMILY MEDICINE | Facility: OTHER | Age: 29
End: 2019-07-18

## 2019-07-18 ENCOUNTER — HOSPITAL ENCOUNTER (EMERGENCY)
Facility: CLINIC | Age: 29
Discharge: HOME | End: 2019-07-18
Attending: EMERGENCY MEDICINE | Admitting: EMERGENCY MEDICINE
Payer: MEDICARE

## 2019-07-18 VITALS
OXYGEN SATURATION: 99 % | DIASTOLIC BLOOD PRESSURE: 91 MMHG | HEART RATE: 96 BPM | BODY MASS INDEX: 33.18 KG/M2 | TEMPERATURE: 98.8 F | WEIGHT: 180.3 LBS | HEIGHT: 62 IN | SYSTOLIC BLOOD PRESSURE: 147 MMHG | RESPIRATION RATE: 16 BRPM

## 2019-07-18 DIAGNOSIS — M54.9 CHRONIC BACK PAIN, UNSPECIFIED BACK LOCATION, UNSPECIFIED BACK PAIN LATERALITY: ICD-10-CM

## 2019-07-18 DIAGNOSIS — G89.29 CHRONIC BACK PAIN, UNSPECIFIED BACK LOCATION, UNSPECIFIED BACK PAIN LATERALITY: ICD-10-CM

## 2019-07-18 DIAGNOSIS — E16.2 HYPOGLYCEMIA: ICD-10-CM

## 2019-07-18 LAB
ALBUMIN SERPL-MCNC: 3.9 G/DL (ref 3.4–5)
ALBUMIN UR-MCNC: NEGATIVE MG/DL
ALP SERPL-CCNC: 98 U/L (ref 40–150)
ALT SERPL W P-5'-P-CCNC: 30 U/L (ref 0–50)
ANION GAP SERPL CALCULATED.3IONS-SCNC: 6 MMOL/L (ref 3–14)
APPEARANCE UR: CLEAR
AST SERPL W P-5'-P-CCNC: 25 U/L (ref 0–45)
BASOPHILS # BLD AUTO: 0 10E9/L (ref 0–0.2)
BASOPHILS NFR BLD AUTO: 0.3 %
BILIRUB SERPL-MCNC: 0.4 MG/DL (ref 0.2–1.3)
BILIRUB UR QL STRIP: NEGATIVE
BUN SERPL-MCNC: 11 MG/DL (ref 7–30)
CALCIUM SERPL-MCNC: 9 MG/DL (ref 8.5–10.1)
CHLORIDE SERPL-SCNC: 106 MMOL/L (ref 94–109)
CO2 SERPL-SCNC: 26 MMOL/L (ref 20–32)
COLOR UR AUTO: NORMAL
CREAT SERPL-MCNC: 0.85 MG/DL (ref 0.52–1.04)
DIFFERENTIAL METHOD BLD: NORMAL
EOSINOPHIL # BLD AUTO: 0 10E9/L (ref 0–0.7)
EOSINOPHIL NFR BLD AUTO: 0.4 %
ERYTHROCYTE [DISTWIDTH] IN BLOOD BY AUTOMATED COUNT: 12.4 % (ref 10–15)
GFR SERPL CREATININE-BSD FRML MDRD: >90 ML/MIN/{1.73_M2}
GLUCOSE BLDC GLUCOMTR-MCNC: 86 MG/DL (ref 70–99)
GLUCOSE BLDC GLUCOMTR-MCNC: 89 MG/DL (ref 70–99)
GLUCOSE SERPL-MCNC: 64 MG/DL (ref 70–99)
GLUCOSE UR STRIP-MCNC: NEGATIVE MG/DL
HCG UR QL: NEGATIVE
HCT VFR BLD AUTO: 41.4 % (ref 35–47)
HGB BLD-MCNC: 13.7 G/DL (ref 11.7–15.7)
HGB UR QL STRIP: NEGATIVE
IMM GRANULOCYTES # BLD: 0 10E9/L (ref 0–0.4)
IMM GRANULOCYTES NFR BLD: 0.4 %
KETONES UR STRIP-MCNC: NEGATIVE MG/DL
LEUKOCYTE ESTERASE UR QL STRIP: NEGATIVE
LYMPHOCYTES # BLD AUTO: 2 10E9/L (ref 0.8–5.3)
LYMPHOCYTES NFR BLD AUTO: 25.1 %
MCH RBC QN AUTO: 31.9 PG (ref 26.5–33)
MCHC RBC AUTO-ENTMCNC: 33.1 G/DL (ref 31.5–36.5)
MCV RBC AUTO: 96 FL (ref 78–100)
MONOCYTES # BLD AUTO: 0.7 10E9/L (ref 0–1.3)
MONOCYTES NFR BLD AUTO: 8.3 %
NEUTROPHILS # BLD AUTO: 5.1 10E9/L (ref 1.6–8.3)
NEUTROPHILS NFR BLD AUTO: 65.5 %
NITRATE UR QL: NEGATIVE
NRBC # BLD AUTO: 0 10*3/UL
NRBC BLD AUTO-RTO: 0 /100
PH UR STRIP: 6.5 PH (ref 5–7)
PLATELET # BLD AUTO: 263 10E9/L (ref 150–450)
POTASSIUM SERPL-SCNC: 4 MMOL/L (ref 3.4–5.3)
PROT SERPL-MCNC: 8.1 G/DL (ref 6.8–8.8)
RBC # BLD AUTO: 4.3 10E12/L (ref 3.8–5.2)
SODIUM SERPL-SCNC: 138 MMOL/L (ref 133–144)
SOURCE: NORMAL
SP GR UR STRIP: 1.01 (ref 1–1.03)
TROPONIN I SERPL-MCNC: <0.015 UG/L (ref 0–0.04)
TSH SERPL DL<=0.005 MIU/L-ACNC: 0.44 MU/L (ref 0.4–4)
UROBILINOGEN UR STRIP-MCNC: NORMAL MG/DL (ref 0–2)
WBC # BLD AUTO: 7.8 10E9/L (ref 4–11)

## 2019-07-18 PROCEDURE — 85025 COMPLETE CBC W/AUTO DIFF WBC: CPT | Performed by: EMERGENCY MEDICINE

## 2019-07-18 PROCEDURE — 93005 ELECTROCARDIOGRAM TRACING: CPT | Performed by: EMERGENCY MEDICINE

## 2019-07-18 PROCEDURE — 00000146 ZZHCL STATISTIC GLUCOSE BY METER IP

## 2019-07-18 PROCEDURE — 84484 ASSAY OF TROPONIN QUANT: CPT | Performed by: EMERGENCY MEDICINE

## 2019-07-18 PROCEDURE — 99284 EMERGENCY DEPT VISIT MOD MDM: CPT | Mod: Z6 | Performed by: EMERGENCY MEDICINE

## 2019-07-18 PROCEDURE — 81003 URINALYSIS AUTO W/O SCOPE: CPT | Performed by: EMERGENCY MEDICINE

## 2019-07-18 PROCEDURE — 81025 URINE PREGNANCY TEST: CPT | Performed by: EMERGENCY MEDICINE

## 2019-07-18 PROCEDURE — 99284 EMERGENCY DEPT VISIT MOD MDM: CPT | Mod: 25 | Performed by: EMERGENCY MEDICINE

## 2019-07-18 PROCEDURE — 84443 ASSAY THYROID STIM HORMONE: CPT | Performed by: EMERGENCY MEDICINE

## 2019-07-18 PROCEDURE — 25000128 H RX IP 250 OP 636: Performed by: EMERGENCY MEDICINE

## 2019-07-18 PROCEDURE — 80053 COMPREHEN METABOLIC PANEL: CPT | Performed by: EMERGENCY MEDICINE

## 2019-07-18 PROCEDURE — 96360 HYDRATION IV INFUSION INIT: CPT | Performed by: EMERGENCY MEDICINE

## 2019-07-18 PROCEDURE — 96361 HYDRATE IV INFUSION ADD-ON: CPT | Performed by: EMERGENCY MEDICINE

## 2019-07-18 RX ORDER — SODIUM CHLORIDE 9 MG/ML
1000 INJECTION, SOLUTION INTRAVENOUS CONTINUOUS
Status: DISCONTINUED | OUTPATIENT
Start: 2019-07-18 | End: 2019-07-18 | Stop reason: HOSPADM

## 2019-07-18 RX ORDER — DEXTROSE MONOHYDRATE 25 G/50ML
25 INJECTION, SOLUTION INTRAVENOUS ONCE
Status: DISCONTINUED | OUTPATIENT
Start: 2019-07-18 | End: 2019-07-18 | Stop reason: HOSPADM

## 2019-07-18 RX ADMIN — SODIUM CHLORIDE 1000 ML: 900 INJECTION, SOLUTION INTRAVENOUS at 17:45

## 2019-07-18 ASSESSMENT — ENCOUNTER SYMPTOMS
DIARRHEA: 0
COUGH: 0
CHILLS: 1
VOMITING: 0
DIAPHORESIS: 1
BACK PAIN: 1
NAUSEA: 0

## 2019-07-18 ASSESSMENT — MIFFLIN-ST. JEOR: SCORE: 1491.09

## 2019-07-18 NOTE — ED NOTES
Pt. Awake and alert and asymptomatic (BG 64). Pt. Drank 2 apple juice to increase BG. Pt. Reports a hx of intermittent hypoglycemia. Will recheck BG @ 1800.

## 2019-07-18 NOTE — ED NOTES
"ED Triage Provider Note  [facility]  Encounter Date: Jul 18, 2019    History:  Chief Complaint   Patient presents with     Hypertension     Back Pain     Katy Islas is a 29 year old female who presents to the ED with generalized pain and feeling unwell. This occurred suddenly this afternoon. She notes generalized body pain which is worse in her lower back. No known precipitating factor. She had a colonoscopy yesterday and has been recovering well.    Review of Systems:  Back pain    Exam:  /90   Pulse 124   Temp 98.5  F (36.9  C) (Oral)   Resp 16   Ht 1.575 m (5' 2\")   Wt 81.8 kg (180 lb 4.8 oz)   SpO2 100%   BMI 32.98 kg/m    General: No acute distress. Appears stated age.   Cardio: Regular rate, extremities well perfused  Resp: Normal work of breathing, grossly normal respiratory rate  Neuro: Alert. CN II-XII grossly intact. Grossly intact strength.       Medical Decision Making:  Patient arriving to the ED with problem as above. A medical screening exam was performed. Lab orders initiated from Triage. The patient is appropriate to wait in triage.       Augustine Mckeon,  on 7/18/2019 at 3:00 PM     Augustine Mckeon,   07/18/19 1920    "

## 2019-07-18 NOTE — ED AVS SNAPSHOT
[facility], [place], Emergency Department  [address] [place] [id number]  Phone:  [phone]                                    Katy Islas   MRN: [id number]    Department:  [facility], Emergency Department   Date of Visit:  7/18/2019           After Visit Summary Signature Page    I have received my discharge instructions, and my questions have been answered. I have discussed any challenges I see with this plan with the nurse or doctor.    ..........................................................................................................................................  Patient/Patient Representative Signature      ..........................................................................................................................................  Patient Representative Print Name and Relationship to Patient    ..................................................               ................................................  Date                                   Time    ..........................................................................................................................................  Reviewed by Signature/Title    ...................................................              ..............................................  Date                                               Time          22EPIC Rev 08/18

## 2019-07-19 ENCOUNTER — TRANSFERRED RECORDS (OUTPATIENT)
Dept: HEALTH INFORMATION MANAGEMENT | Facility: CLINIC | Age: 29
End: 2019-07-19

## 2019-07-19 ENCOUNTER — MYC MEDICAL ADVICE (OUTPATIENT)
Dept: FAMILY MEDICINE | Facility: OTHER | Age: 29
End: 2019-07-19

## 2019-07-19 ENCOUNTER — PATIENT OUTREACH (OUTPATIENT)
Dept: FAMILY MEDICINE | Facility: OTHER | Age: 29
End: 2019-07-19

## 2019-07-19 LAB
COPATH REPORT: NORMAL
INTERPRETATION ECG - MUSE: NORMAL

## 2019-07-19 NOTE — PROGRESS NOTES
Clinic Care Coordination Contact    Situation: Patient chart reviewed by care coordinator.    Background: patient was not engaging in care coordination follow up calls, and chose not to respond to the 7/2/19 [employer] message sent to patient. Patient was then listed as not a candidate for care coordination. Patient is appearing on ER follow up list with readmission score of 84%, 14 emergency room visits, and no hospitalizations in the last 90 days.     Patient was seen in ER for back pain. ER advised no new pain plan, and to follow up with pain clinic.     Assessment: patient has good communication with care team, via [employer] and phone. Patient has pain clinic appointment today, and seeing PCP 7/22, and 7/25 for follow up.     Plan/Recommendations: CC RN will reach out to patient after her clinic appointments early next week.     RADHA Otto RN Clinic Care Coordinator   [place], [place], [place]  Phone: [phone]

## 2019-07-19 NOTE — TELEPHONE ENCOUNTER
Katy Islas is a 29 year old female who presents with hx of chronic back pain, requesting a written prescription for higher dose of fentanyl patch 25 mcg.  Chart reviewed.    Pt also concerned about hypoglycemia she was noted to have yesterday at the ED, as well has a high BP reading at home this morning.    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 Yes      NURSING PLAN: Routed to provider Yes and Nursing advice to patient :   - CDL out of clinic today, unable to discuss increased fentanyl patch script with CDL until CDL returns 7/22 - also still waiting for pain clinic recommendations per Epic  - agreed to route her concerns/situation to CDL now, so she is aware/up to speed before 7/22 appt  - keep monitoring BP/HR at home as directed, take all medications as directed  - provided education on normal BG levels in non-diabetic adults, maintaining consistent BG levels throughout the day, including eating several times daily (3 meals, snacks in between, and bedtime  snack) including protein or complex carbs.   - Recommended she report symptom update to CDL 7/22 if consistent eating does not help prevent shaky/'not right' feeling between meals.    RECOMMENDED DISPOSITION:  See in 72 hours -   Next 5 appointments (look out 90 days)    Jul 22, 2019  9:30 AM CDT  Office Visit with Aura Rosario PA-C, [facility]  [facility] [address] [place] [id number]  [phone]   Jul 25, 2019  4:00 PM CDT  Office Visit with Aura Rosario PA-C, Dayana Roldan  [facility] [address] [place] [id number]  [phone]          Will comply with recommendation: Yes  If further questions/concerns or if symptoms do not improve, worsen or new symptoms develop, call your PCP or [facility] as soon as possible.  Go to the ER if symptoms worsen over the weekend.    Pt verbalizes understanding and agrees with this plan.    Guideline used:  Telephone Triage Protocols for Nurses, Fifth Edition, Telma Layne RN

## 2019-07-19 NOTE — DISCHARGE INSTRUCTIONS
All of the blood tests and EKG that were done today are normal  I am not comfortable providing additional opiate pain medicine since you are on a fentanyl patch  See the pain clinic tomorrow as scheduled

## 2019-07-19 NOTE — ED PROVIDER NOTES
[place] EMERGENCY DEPARTMENT (Formerly [facility])  7/18/19   History     Chief Complaint   Patient presents with     Hypertension     Back Pain     The history is provided by the patient. A  was used (Official In-Person ).     Katy Islas is a 29 year old female with a medical history significant for deafness, Crohn's colitis, chronic pain with a pain contract, JULIETH, depression and tobacco abuse who presents to the Emergency Department primarily for worsening back pain as well as overall feeling unwell.  The patient reports that after lunch today she had sudden onset of chills and diaphoresis.  She also feels that her back pain worsened today.  She denies any cough, nausea, vomiting or diarrhea.  The patient was eating when we evaluated her and she reports that she does feel a little bit better after eating.  The patient does currently have a fentanyl patch on for her back pain.  She reports that she has contacted her PCP about her worsening, chronic back pain and she was told to see her pain clinic.  She does have an appointment scheduled for tomorrow at 10 AM.  She is also currently scheduled to see her PCP on 7/22/2019.    I have reviewed the Medications, Allergies, Past Medical and Surgical History, and Social History in the "[employer] system.    Past Medical History:   Diagnosis Date     Abdominal pain 10/31- 11/4/2005    [facility] admit for Crohn[facility]     Allergic rhinitis, cause unspecified     Allergic rhinitis     Arthritis      C. difficile diarrhea     Past, no current diarrhea.     Crohn's disease (H)     sees Dr Summers or Ryan at [facility] in [place]     Crohn's disease (H)      Depression with anxiety 2003    Dr Bernard (psychiatry) at 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, without mention of intractable epilepsy     pseudoseizures diagnosed after extensive neurologic eval     Migraine 07/21/12    D/C 07/22/12-[facility]     Migraine, unspecified, without mention of intractable migraine without mention of status migrainosus     Migraine     Mild intermittent asthma     mild intermittent     Mycoplasma infection in conditions classified elsewhere and of unspecified site      Other chronic pain     Back pain for 6 years     Renal disease     Kidney stones     Unspecified hearing loss     congenital hearing loss       Past Surgical History:   Procedure Laterality Date     AS REMOVAL OF COCCYX  10/18/2017     C FUSION OF SACROILIAC JOINT  10/26/2018     COLONOSCOPY  7/1/2009    [facility].     COMBINED ESOPHAGOSCOPY, GASTROSCOPY, DUODENOSCOPY (EGD) WITH CO2 INSUFFLATION N/A 4/12/2019    Procedure: COMBINED ESOPHAGOSCOPY, GASTROSCOPY, DUODENOSCOPY (EGD) WITH CO2 INSUFFLATION;  Surgeon: Edwin Conde MD;  Location: [facility] OR     ESOPHAGOSCOPY, GASTROSCOPY, DUODENOSCOPY (EGD), COMBINED N/A 4/12/2019    Procedure: Combined Esophagoscopy, Gastroscopy, Duodenoscopy (Egd), Biopsy Single Or Multiple;  Surgeon: Edwin Conde MD;  Location: [facility] OR     FUSION SPINE POSTERIOR MINIMALLY INVASIVE ONE LEVEL N/A 2/23/2017    Procedure: FUSION SPINE POSTERIOR MINIMALLY INVASIVE ONE LEVEL;  L4-5 Oblique Lateral Lumbar Interbody Fusion   Epidural steroid injection.   Transpedicular Bone marrow aspiration;  Surgeon: Jeniffer Eugene MD;  Location: [facility] OR     HC COLONOSCOPY THRU STOMA WITH BIOPSY  10/29/2003    Impression is that of normal appearing colonoscopy, without evidence of rectal bleeding.     HC COLONOSCOPY THRU STOMA, DIAGNOSTIC  10/00    normal     HC COLONOSCOPY THRU STOMA, DIAGNOSTIC  Oct 2009    Dr López- normal     HC EEG AWAKE AND SLEEP       abnormal     HC MRI BRAIN W/O CONTRAST  12/00    normal     HC REMOVAL GALLBLADDER  8/5/2009    [facility], [place]     HC UGI ENDOSCOPY DIAG W BIOPSY  11/11/09    Normal esophagus     HC UGI ENDOSCOPY, SIMPLE EXAM  7/00, 10/00    mild chronic esophagitis and duodenitis, neg H pylori     HC UGI ENDOSCOPY, SIMPLE EXAM  01/20/2005    Esophagogastroduodenoscopy, colonoscopy with biopsies.  [facility]     HC UGI ENDOSCOPY, SIMPLE EXAM  7/1/2009    [facility], [place]     HC UGI ENDOSCOPY, SIMPLE EXAM  11/11/2009    attempted upper GI, pt. could not tolerate procedure:[facility]     ORTHOPEDIC SURGERY  October 19,2011    diskectomy L4-L5       Family History   Problem Relation Age of Onset     Gastrointestinal Disease Brother         severe Crohn's     Neurologic Disorder Brother         Seizures post head injury     Depression Brother      Substance Abuse Brother      Genitourinary Problems Father         kidney stones     Diabetes Father      Heart Disease Father         Open heart surgery     Breast Cancer Maternal Grandmother      Parkinsonism Maternal Grandmother      Cerebrovascular Disease Paternal Grandmother      Cancer Maternal Grandfather         Lung     Cardiovascular Paternal Grandfather         Heart Attack     Substance Abuse Mother         in recovery x1 year      Lung Cancer Paternal Uncle      Cancer Paternal Uncle      Lung Cancer Maternal Uncle        Social History     Tobacco Use     Smoking status: Current Every Day Smoker     Packs/day: 0.50     Years: 5.00     Pack years: 2.50     Types: Cigarettes     Smokeless tobacco: Never Used   Substance Use Topics     Alcohol use: Not Currently     Comment: Not since last July       Current Facility-Administered Medications   Medication     dextrose 50 % injection 25 mL     sodium chloride 0.9% infusion     Current Outpatient Medications   Medication     acetaminophen (TYLENOL) 500 MG tablet      albuterol (2.5 MG/3ML) 0.083% neb solution     albuterol (PROAIR HFA/PROVENTIL HFA/VENTOLIN HFA) 108 (90 Base) MCG/ACT Inhaler     ARIPiprazole (ABILIFY) 15 MG tablet     aspirin (ASA) 81 MG tablet     busPIRone (BUSPAR) 15 MG tablet     clonazePAM (KLONOPIN) 0.5 MG tablet     cyclobenzaprine (FLEXERIL) 10 MG tablet     dicyclomine (BENTYL) 20 MG tablet     diphenhydrAMINE (BENADRYL) 50 MG capsule     DULoxetine (CYMBALTA) 60 MG EC capsule     EPINEPHrine (EPIPEN/ADRENACLICK/OR ANY BX GENERIC EQUIV) 0.3 MG/0.3ML injection 2-pack     fentaNYL (DURAGESIC) 12 mcg/hr 72 hr patch     gabapentin (NEURONTIN) 300 MG capsule     levocetirizine (XYZAL) 5 MG tablet     losartan (COZAAR) 50 MG tablet     medical cannabis (Patient's own supply)     medroxyPROGESTERone (DEPO-PROVERA) 150 MG/ML IM injection     methocarbamol (ROBAXIN) 750 MG tablet     metoprolol succinate ER (TOPROL-XL) 100 MG 24 hr tablet     mupirocin (BACTROBAN) 2 % external ointment     nicotine (NICODERM CQ) 14 MG/24HR 24 hr patch     NONFORMULARY     nystatin (MYCOSTATIN) cream     pantoprazole (PROTONIX) 40 MG EC tablet     prazosin (MINIPRESS) 1 MG capsule     prochlorperazine (COMPAZINE) 10 MG tablet     prochlorperazine (COMPAZINE) 25 MG suppository     rizatriptan (MAXALT-MLT) 5 MG ODT tab     sucralfate (CARAFATE) 1 GM tablet        Allergies   Allergen Reactions     Iohexol Hives     Other reaction(s): Hives  IV contrast; patient states she tolerates contrast if she gets benadryl before  IV contrast; patient states she tolerates contrast if she gets benadryl before     Ativan [Lorazepam] Hives     At the IV site     Baclofen      hives     Bees Hives, Swelling and Difficulty breathing     Caffeine      Contrast Dye      Hives,   Updated 5/10/2016 CT Contrast.     Iodine Hives     Methocarbamol Swelling     Metoclopramide      Other reaction(s): Tremors  LW Reaction: shaking/sweating     Midazolam      Other reaction(s): Agitation     Monosodium  "Glutamate      Morphine Other (See Comments)     Difficulty with urination     Nsaids Other (See Comments)     GI BLEED x2     Other (Do Not Use) Other (See Comments)     Xanaflex- pt becomes disoriented and loses bladder control     Reglan [Metoclopramide Hcl] Other (See Comments)     shaking     Soma [Carisoprodol] Visual Disturbance     Sleep walking     Tizanidine      Topamax Other (See Comments)     Topamax [Topiramate] Nausea     Tingling  GI/Vomit     Tramadol      Severe Headache, Seizure Risk     Tylenol W/Codeine [Acetaminophen-Codeine] Nausea and Itching     Tylenol 3     Valium Other (See Comments)     Becomes aggressive and angry     Versed Other (See Comments)     Zolmitriptan      Makes face feel like its twitching     Droperidol Anxiety     Flu Virus Vaccine Rash      Arm swelling         Review of Systems   Constitutional: Positive for chills and diaphoresis.   Respiratory: Negative for cough.    Gastrointestinal: Negative for diarrhea, nausea and vomiting.   Musculoskeletal: Positive for back pain (chronic; worsening).   All other systems reviewed and are negative.      Physical Exam   BP: 141/90  Pulse: 124  Heart Rate: 103  Temp: 98.5  F (36.9  C)  Resp: 16  Height: 157.5 cm (5' 2\")  Weight: 81.8 kg (180 lb 4.8 oz)  SpO2: 100 %      Physical Exam   Constitutional: She is oriented to person, place, and time. She appears well-developed and well-nourished. No distress.   Cardiovascular: Normal rate, regular rhythm and normal heart sounds.   Pulmonary/Chest: Effort normal and breath sounds normal.   Neurological: She is alert and oriented to person, place, and time.   Nursing note and vitals reviewed.      ED Course   7:27 PM  The patient was seen and examined by Kirk Rojas MD in [place].        Procedures        Offered Toradol and Lidoderm patch, patient declined       Labs Ordered and Resulted from Time of ED Arrival Up to the Time of Departure from the ED   COMPREHENSIVE METABOLIC PANEL - " "Abnormal; Notable for the following components:       Result Value    Glucose 64 (*)     All other components within normal limits   CBC WITH PLATELETS DIFFERENTIAL   TROPONIN I   UA MACROSCOPIC WITH REFLEX TO MICRO AND CULTURE   HCG QUALITATIVE URINE   TSH WITH FREE T4 REFLEX   GLUCOSE BY METER   GLUCOSE BY METER            Assessments & Plan (with Medical Decision Making)   29-year-old female brought in for \"not feeling well\".  Unfortunately, because of our bed situation, she waited almost 5 hours to be seen.  By that time all of her labs that were ordered in triage had returned and they were all normal.  She has a history of chronic back pain and has a fentanyl patch on.  She was requesting additional pain medicine, I offered her Toradol or Lidoderm patch, she declined these.  I explained through the use of a  that since you are on a fentanyl patch, I am not comfortable providing more opiates for you and your PCP is aware of the back pain and referred you to the pain clinic where you are going tomorrow.  Patient was not happy with this and was asking to be discharged.    This part of the medical record was transcribed by Gamal Douglas, Medical Scribe, from a dictation done by Kirk Rojas MD.       I have reviewed the nursing notes.    I have reviewed the findings, diagnosis, plan and need for follow up with the patient.       Medication List      ASK your doctor about these medications    ondansetron 4 MG ODT tab  Commonly known as:  ZOFRAN ODT  4 mg, Oral, EVERY 6 HOURS PRN  Ask about: Should I take this medication?            Final diagnoses:   None     I, Gamal Douglas, am serving as a trained medical scribe to document services personally performed by Kirk Rojas MD, based on the provider's statements to me.   IKirk MD, was physically present and have reviewed and verified the accuracy of this note documented by Gamal Douglas.    7/18/2019   [facility], " [facility], EMERGENCY DEPARTMENT     Kirk Rojas MD  07/18/19 2006

## 2019-07-20 ENCOUNTER — APPOINTMENT (OUTPATIENT)
Dept: GENERAL RADIOLOGY | Facility: CLINIC | Age: 29
End: 2019-07-20
Attending: NURSE PRACTITIONER
Payer: MEDICARE

## 2019-07-20 ENCOUNTER — HOSPITAL ENCOUNTER (EMERGENCY)
Facility: CLINIC | Age: 29
Discharge: LEFT BEFORE DISCHARGE | End: 2019-07-20
Attending: NURSE PRACTITIONER | Admitting: NURSE PRACTITIONER
Payer: MEDICARE

## 2019-07-20 ENCOUNTER — NURSE TRIAGE (OUTPATIENT)
Dept: NURSING | Facility: CLINIC | Age: 29
End: 2019-07-20

## 2019-07-20 ENCOUNTER — HOSPITAL ENCOUNTER (EMERGENCY)
Facility: CLINIC | Age: 29
Discharge: HOME | End: 2019-07-20
Attending: NURSE PRACTITIONER
Payer: MEDICARE

## 2019-07-20 VITALS
RESPIRATION RATE: 20 BRPM | OXYGEN SATURATION: 98 % | DIASTOLIC BLOOD PRESSURE: 91 MMHG | TEMPERATURE: 98.2 F | SYSTOLIC BLOOD PRESSURE: 128 MMHG | WEIGHT: 170 LBS | BODY MASS INDEX: 31.09 KG/M2 | HEART RATE: 97 BPM

## 2019-07-20 VITALS
HEART RATE: 111 BPM | BODY MASS INDEX: 33.13 KG/M2 | TEMPERATURE: 97.7 F | SYSTOLIC BLOOD PRESSURE: 135 MMHG | RESPIRATION RATE: 16 BRPM | OXYGEN SATURATION: 98 % | DIASTOLIC BLOOD PRESSURE: 103 MMHG | WEIGHT: 180 LBS | HEIGHT: 62 IN

## 2019-07-20 DIAGNOSIS — R07.9 CHEST PAIN: ICD-10-CM

## 2019-07-20 DIAGNOSIS — R07.9 ACUTE CHEST PAIN: ICD-10-CM

## 2019-07-20 DIAGNOSIS — R03.0 ELEVATED BP WITHOUT DIAGNOSIS OF HYPERTENSION: ICD-10-CM

## 2019-07-20 LAB
ALBUMIN SERPL-MCNC: 3.7 G/DL (ref 3.4–5)
ALBUMIN UR-MCNC: NEGATIVE MG/DL
ALP SERPL-CCNC: 91 U/L (ref 40–150)
ALT SERPL W P-5'-P-CCNC: 27 U/L (ref 0–50)
ANION GAP SERPL CALCULATED.3IONS-SCNC: 7 MMOL/L (ref 3–14)
APPEARANCE UR: CLEAR
AST SERPL W P-5'-P-CCNC: 13 U/L (ref 0–45)
BASOPHILS # BLD AUTO: 0 10E9/L (ref 0–0.2)
BASOPHILS NFR BLD AUTO: 0.3 %
BILIRUB SERPL-MCNC: 0.4 MG/DL (ref 0.2–1.3)
BILIRUB UR QL STRIP: NEGATIVE
BUN SERPL-MCNC: 13 MG/DL (ref 7–30)
CALCIUM SERPL-MCNC: 8.9 MG/DL (ref 8.5–10.1)
CHLORIDE SERPL-SCNC: 108 MMOL/L (ref 94–109)
CO2 SERPL-SCNC: 28 MMOL/L (ref 20–32)
COLOR UR AUTO: YELLOW
CREAT SERPL-MCNC: 0.75 MG/DL (ref 0.52–1.04)
D DIMER PPP FEU-MCNC: 0.3 UG/ML FEU (ref 0–0.5)
DIFFERENTIAL METHOD BLD: NORMAL
EOSINOPHIL NFR BLD AUTO: 0.3 %
ERYTHROCYTE [DISTWIDTH] IN BLOOD BY AUTOMATED COUNT: 12.3 % (ref 10–15)
GFR SERPL CREATININE-BSD FRML MDRD: >90 ML/MIN/{1.73_M2}
GLUCOSE SERPL-MCNC: 91 MG/DL (ref 70–99)
GLUCOSE UR STRIP-MCNC: NEGATIVE MG/DL
HCT VFR BLD AUTO: 38.9 % (ref 35–47)
HGB BLD-MCNC: 13.8 G/DL (ref 11.7–15.7)
HGB UR QL STRIP: NEGATIVE
IMM GRANULOCYTES # BLD: 0 10E9/L (ref 0–0.4)
IMM GRANULOCYTES NFR BLD: 0.1 %
INR PPP: 0.99 (ref 0.86–1.14)
KETONES UR STRIP-MCNC: NEGATIVE MG/DL
LEUKOCYTE ESTERASE UR QL STRIP: NEGATIVE
LIPASE SERPL-CCNC: 128 U/L (ref 73–393)
LYMPHOCYTES # BLD AUTO: 1.4 10E9/L (ref 0.8–5.3)
LYMPHOCYTES NFR BLD AUTO: 17.6 %
MCH RBC QN AUTO: 33 PG (ref 26.5–33)
MCHC RBC AUTO-ENTMCNC: 35.5 G/DL (ref 31.5–36.5)
MCV RBC AUTO: 93 FL (ref 78–100)
MONOCYTES # BLD AUTO: 0.6 10E9/L (ref 0–1.3)
MONOCYTES NFR BLD AUTO: 7.9 %
NEUTROPHILS # BLD AUTO: 5.7 10E9/L (ref 1.6–8.3)
NEUTROPHILS NFR BLD AUTO: 73.8 %
NITRATE UR QL: NEGATIVE
NRBC # BLD AUTO: 0 10*3/UL
NRBC BLD AUTO-RTO: 0 /100
PH UR STRIP: 7 PH (ref 5–7)
PLATELET # BLD AUTO: 259 10E9/L (ref 150–450)
POTASSIUM SERPL-SCNC: 3.8 MMOL/L (ref 3.4–5.3)
PROT SERPL-MCNC: 7.3 G/DL (ref 6.8–8.8)
RBC # BLD AUTO: 4.18 10E12/L (ref 3.8–5.2)
SODIUM SERPL-SCNC: 143 MMOL/L (ref 133–144)
SOURCE: NORMAL
SP GR UR STRIP: 1.01 (ref 1–1.03)
TROPONIN I SERPL-MCNC: <0.015 UG/L (ref 0–0.04)
TROPONIN I SERPL-MCNC: <0.015 UG/L (ref 0–0.04)
UROBILINOGEN UR STRIP-MCNC: 0 MG/DL (ref 0–2)
WBC # BLD AUTO: 7.7 10E9/L (ref 4–11)

## 2019-07-20 PROCEDURE — 25000132 ZZH RX MED GY IP 250 OP 250 PS 637: Mod: GY | Performed by: NURSE PRACTITIONER

## 2019-07-20 PROCEDURE — 85610 PROTHROMBIN TIME: CPT | Performed by: NURSE PRACTITIONER

## 2019-07-20 PROCEDURE — 80053 COMPREHEN METABOLIC PANEL: CPT | Performed by: NURSE PRACTITIONER

## 2019-07-20 PROCEDURE — 93010 ELECTROCARDIOGRAM REPORT: CPT | Mod: Z6 | Performed by: NURSE PRACTITIONER

## 2019-07-20 PROCEDURE — 84484 ASSAY OF TROPONIN QUANT: CPT | Performed by: NURSE PRACTITIONER

## 2019-07-20 PROCEDURE — 99285 EMERGENCY DEPT VISIT HI MDM: CPT | Mod: 25 | Performed by: NURSE PRACTITIONER

## 2019-07-20 PROCEDURE — 85379 FIBRIN DEGRADATION QUANT: CPT | Performed by: NURSE PRACTITIONER

## 2019-07-20 PROCEDURE — 96361 HYDRATE IV INFUSION ADD-ON: CPT | Performed by: NURSE PRACTITIONER

## 2019-07-20 PROCEDURE — 93005 ELECTROCARDIOGRAM TRACING: CPT | Performed by: NURSE PRACTITIONER

## 2019-07-20 PROCEDURE — 83690 ASSAY OF LIPASE: CPT | Performed by: NURSE PRACTITIONER

## 2019-07-20 PROCEDURE — 85025 COMPLETE CBC W/AUTO DIFF WBC: CPT | Performed by: NURSE PRACTITIONER

## 2019-07-20 PROCEDURE — 93010 ELECTROCARDIOGRAM REPORT: CPT | Mod: 76 | Performed by: NURSE PRACTITIONER

## 2019-07-20 PROCEDURE — 25000128 H RX IP 250 OP 636: Performed by: NURSE PRACTITIONER

## 2019-07-20 PROCEDURE — 25800030 ZZH RX IP 258 OP 636: Performed by: NURSE PRACTITIONER

## 2019-07-20 PROCEDURE — 99284 EMERGENCY DEPT VISIT MOD MDM: CPT | Mod: 25,27 | Performed by: NURSE PRACTITIONER

## 2019-07-20 PROCEDURE — 96374 THER/PROPH/DIAG INJ IV PUSH: CPT | Performed by: NURSE PRACTITIONER

## 2019-07-20 PROCEDURE — 81003 URINALYSIS AUTO W/O SCOPE: CPT | Performed by: NURSE PRACTITIONER

## 2019-07-20 PROCEDURE — 36415 COLL VENOUS BLD VENIPUNCTURE: CPT | Performed by: NURSE PRACTITIONER

## 2019-07-20 PROCEDURE — 71046 X-RAY EXAM CHEST 2 VIEWS: CPT | Mod: TC

## 2019-07-20 RX ORDER — LOSARTAN POTASSIUM 25 MG/1
25 TABLET ORAL ONCE
Status: COMPLETED | OUTPATIENT
Start: 2019-07-20 | End: 2019-07-20

## 2019-07-20 RX ORDER — ASPIRIN 81 MG/1
324 TABLET, CHEWABLE ORAL ONCE
Status: COMPLETED | OUTPATIENT
Start: 2019-07-20 | End: 2019-07-20

## 2019-07-20 RX ORDER — LOSARTAN POTASSIUM 25 MG/1
25 TABLET ORAL ONCE
Status: DISCONTINUED | OUTPATIENT
Start: 2019-07-20 | End: 2019-07-20

## 2019-07-20 RX ORDER — ONDANSETRON 2 MG/ML
4 INJECTION INTRAMUSCULAR; INTRAVENOUS EVERY 30 MIN PRN
Status: DISCONTINUED | OUTPATIENT
Start: 2019-07-20 | End: 2019-07-20 | Stop reason: HOSPADM

## 2019-07-20 RX ORDER — DIPHENHYDRAMINE HYDROCHLORIDE 50 MG/ML
25 INJECTION INTRAMUSCULAR; INTRAVENOUS ONCE
Status: COMPLETED | OUTPATIENT
Start: 2019-07-20 | End: 2019-07-20

## 2019-07-20 RX ORDER — SODIUM CHLORIDE 9 MG/ML
INJECTION, SOLUTION INTRAVENOUS CONTINUOUS
Status: DISCONTINUED | OUTPATIENT
Start: 2019-07-20 | End: 2019-07-20 | Stop reason: HOSPADM

## 2019-07-20 RX ORDER — NITROGLYCERIN 0.4 MG/1
0.4 TABLET SUBLINGUAL ONCE
Status: COMPLETED | OUTPATIENT
Start: 2019-07-20 | End: 2019-07-20

## 2019-07-20 RX ADMIN — LOSARTAN POTASSIUM 25 MG: 25 TABLET, FILM COATED ORAL at 18:08

## 2019-07-20 RX ADMIN — DIPHENHYDRAMINE HYDROCHLORIDE 25 MG: 50 INJECTION, SOLUTION INTRAMUSCULAR; INTRAVENOUS at 14:37

## 2019-07-20 RX ADMIN — SODIUM CHLORIDE 125 ML/HR: 9 INJECTION, SOLUTION INTRAVENOUS at 14:32

## 2019-07-20 RX ADMIN — NITROGLYCERIN 0.4 MG: 0.4 TABLET SUBLINGUAL at 14:29

## 2019-07-20 RX ADMIN — ASPIRIN 81 MG 324 MG: 81 TABLET ORAL at 14:10

## 2019-07-20 ASSESSMENT — ENCOUNTER SYMPTOMS
FATIGUE: 0
ABDOMINAL PAIN: 1
NEUROLOGICAL NEGATIVE: 1
APPETITE CHANGE: 0
ACTIVITY CHANGE: 0
EYE REDNESS: 0
ARTHRALGIAS: 1
CONFUSION: 0
FATIGUE: 1
BACK PAIN: 1
SHORTNESS OF BREATH: 0
ARTHRALGIAS: 1
PALPITATIONS: 0
FEVER: 0
ENDOCRINE NEGATIVE: 1
NAUSEA: 1
NAUSEA: 0
BACK PAIN: 1
HEMATOLOGIC/LYMPHATIC NEGATIVE: 1
RESPIRATORY NEGATIVE: 1

## 2019-07-20 ASSESSMENT — MIFFLIN-ST. JEOR: SCORE: 1489.72

## 2019-07-20 NOTE — ED PROVIDER NOTES
History     Chief Complaint   Patient presents with     Chest Pain     Hypertension     HPI  Katy Islas is a 29 year old female who has hx of chronic abdominal and back pain (pain management contract with Aura Rosario), anxiety, crohn's, bipolar presenting with c/o having low blood sugar yesterday at 64 which after eating stabilized presenting with sharp stabbing 6/10 left upper chest wall pain radiating into left shoulder and arm onset at 1000 this morning. She has some associated nausea but reports she is out of zofran. She has not had associated headache, diaphoresis, dyspnea, cough, LE edema.  No hx of CAD.   PCP: Aura Rosario      utilized at bedside    Allergies:  Allergies   Allergen Reactions     Iohexol Hives     Other reaction(s): Hives  IV contrast; patient states she tolerates contrast if she gets benadryl before  IV contrast; patient states she tolerates contrast if she gets benadryl before     Ativan [Lorazepam] Hives     At the IV site     Baclofen      hives     Bees Hives, Swelling and Difficulty breathing     Caffeine      Contrast Dye      Hives,   Updated 5/10/2016 CT Contrast.     Iodine Hives     Methocarbamol Swelling     Metoclopramide      Other reaction(s): Tremors  LW Reaction: shaking/sweating     Midazolam      Other reaction(s): Agitation     Monosodium Glutamate      Morphine Other (See Comments)     Difficulty with urination     Nsaids Other (See Comments)     GI BLEED x2     Other (Do Not Use) Other (See Comments)     Xanaflex- pt becomes disoriented and loses bladder control     Reglan [Metoclopramide Hcl] Other (See Comments)     shaking     Soma [Carisoprodol] Visual Disturbance     Sleep walking     Tizanidine      Topamax Other (See Comments)     Topamax [Topiramate] Nausea     Tingling  GI/Vomit     Tramadol      Severe Headache, Seizure Risk     Tylenol W/Codeine [Acetaminophen-Codeine] Nausea and Itching     Tylenol 3      Valium Other (See Comments)     Becomes aggressive and angry     Versed Other (See Comments)     Zolmitriptan      Makes face feel like its twitching     Droperidol Anxiety     Flu Virus Vaccine Rash      Arm swelling       Problem List:    Patient Active Problem List    Diagnosis Date Noted     Bulge of cervical disc without myelopathy 04/24/2019     Priority: Medium     Cervicalgia 04/17/2019     Priority: Medium     Class 1 obesity due to excess calories without serious comorbidity with body mass index (BMI) of 32.0 to 32.9 in adult 04/03/2019     Priority: Medium     Hypophosphatemia 11/01/2018     Priority: Medium     Patellar maltracking, right 09/05/2018     Priority: Medium     Right anterior knee pain 09/05/2018     Priority: Medium     Melanocytic nevus, unspecified location 07/23/2018     Priority: Medium     Dysplastic skin lesion 07/23/2018     Priority: Medium     Iliotibial band syndrome affecting lower leg, right 12/21/2017     Priority: Medium     Chondromalacia of right patellofemoral joint 12/21/2017     Priority: Medium     Upper GI bleed - suspected 07/01/2017     Priority: Medium     Tobacco use disorder 07/01/2017     Priority: Medium     History of pseudoseizure 07/01/2017     Priority: Medium     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) 03/10/2017     Priority: Medium     Lumbar disc disease with radiculopathy 02/23/2017     Priority: Medium     S/P lumbar fusion 02/23/2017     Priority: Medium     Dr. YOVANI Millan, 2/23/17       Vitamin D deficiency 01/06/2017     Priority: Medium     Atypical mole 01/06/2017     Priority: Medium     Mild persistent asthma without complication 12/02/2016     Priority: Medium     Chronic bilateral low back pain with left-sided sciatica 12/02/2016     Priority: Medium     Bee sting allergy 09/16/2016     Priority: Medium     Gastroesophageal reflux disease without esophagitis 08/26/2016     Priority: Medium     Herpes simplex virus infection  11/12/2015     Priority: Medium     Adjustment disorder with mixed anxiety and depressed mood 10/14/2015     Priority: Medium     Marijuana abuse 02/22/2015     Priority: Medium     Bipolar disorder (H) 01/31/2013     Priority: Medium     Problem list name updated by automated process. Provider to review       Chronic pain 02/09/2012     Priority: Medium     Patient is followed by Aura Rosario PA-C for ongoing prescription of pain medication.  All refills should only be approved by this provider, or covering partner.    Medication(s): Norco    Maximum quantity per month: 70  Clinic visit frequency required: Q 2 months     Controlled substance agreement:   Discussed and signed 4/25/17    Encounter-Level CSA - 01/12/2015:                 Controlled Substance Agreement - Scan on 1/26/2015  9:14 AM : Controlled Medication Agreement-01/12/15 (below)            Pain Clinic evaluation in the past: Yes       Date/Location:   [facility], fall 2016    DIRE Total Score(s):    4/25/2017   Total Score 17       Last [facility] website verification:  done on 4/25/17 by LOVE Maki   [email or web address]           Anxiety 09/22/2011     Priority: Medium     Mild major depression (H) 08/29/2011     Priority: Medium     Mild intermittent asthma 10/12/2009     Priority: Medium     Overview:   Formatting of this note might be different from the original.  Action plan: See letter 4/10/2013   ATAQ:   Asthma Data 4/10/2013   Date completed 4/10/2013   Missed daily activities no = 0   Wake at night no = 0   Believe asthma in control yes = 0   ARIN use yes   Maximum ARIN use in 1 day 1-4 = 0   ATAQ score 0 = well controlled   Asthma ED visits in past 12 mos 0   Asthma hospitalizations in past 12 mos 0     Current Outpatient Prescriptions   Medication Sig     albuterol (PROVENTIL) 0.083 % neb solution Inhale 3 mL via a nebulizer every 4 hours if needed for Shortness Of Breath.        Crohn's colitis (H)  08/04/2009     Priority: Medium     Dysmenorrhea 05/11/2007     Priority: Medium     Benign neoplasm of skin of lower limb, including hip 03/16/2004     Priority: Medium     Migraine      Priority: Medium     Dr. Farrar - Neurology.  Now on Inderal.  Seems to be working.  Follow-up 9/08  Problem list name updated by automated process. Provider to review       Hearing loss      Priority: Medium     Congenital  Problem list name updated by automated process. Provider to review       Allergic rhinitis      Priority: Medium     Problem list name updated by automated process. Provider to review          Past Medical History:    Past Medical History:   Diagnosis Date     Abdominal pain 10/31- 11/4/2005     Allergic rhinitis, cause unspecified      Arthritis      C. 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, without mention of intractable epilepsy      Migraine 07/21/12     Migraine, unspecified, without mention of intractable migraine without mention of status migrainosus      Mild intermittent asthma      Mycoplasma infection in conditions classified elsewhere and of unspecified site      Other chronic pain      Renal disease      Unspecified hearing loss        Past Surgical History:    Past Surgical History:   Procedure Laterality Date     AS REMOVAL OF COCCYX  10/18/2017     C FUSION OF SACROILIAC JOINT  10/26/2018     COLONOSCOPY  7/1/2009    [facility].     COMBINED ESOPHAGOSCOPY, GASTROSCOPY, DUODENOSCOPY (EGD) WITH CO2 INSUFFLATION N/A 4/12/2019    Procedure: COMBINED ESOPHAGOSCOPY, GASTROSCOPY, DUODENOSCOPY (EGD) WITH CO2 INSUFFLATION;  Surgeon: Edwin Conde MD;   Location: [facility] OR     ESOPHAGOSCOPY, GASTROSCOPY, DUODENOSCOPY (EGD), COMBINED N/A 4/12/2019    Procedure: Combined Esophagoscopy, Gastroscopy, Duodenoscopy (Egd), Biopsy Single Or Multiple;  Surgeon: Edwin Conde MD;  Location: [facility] OR     FUSION SPINE POSTERIOR MINIMALLY INVASIVE ONE LEVEL N/A 2/23/2017    Procedure: FUSION SPINE POSTERIOR MINIMALLY INVASIVE ONE LEVEL;  L4-5 Oblique Lateral Lumbar Interbody Fusion   Epidural steroid injection.   Transpedicular Bone marrow aspiration;  Surgeon: Jeniffer Eugene MD;  Location: [facility] OR     HC COLONOSCOPY THRU STOMA WITH BIOPSY  10/29/2003    Impression is that of normal appearing colonoscopy, without evidence of rectal bleeding.     HC COLONOSCOPY THRU STOMA, DIAGNOSTIC  10/00    normal     HC COLONOSCOPY THRU STOMA, DIAGNOSTIC  Oct 2009    Dr López- normal     HC EEG AWAKE AND SLEEP      abnormal     HC MRI BRAIN W/O CONTRAST  12/00    normal     HC REMOVAL GALLBLADDER  8/5/2009    [facility], [facility].     HC UGI ENDOSCOPY DIAG W BIOPSY  11/11/09    Normal esophagus     HC UGI ENDOSCOPY, SIMPLE EXAM  7/00, 10/00    mild chronic esophagitis and duodenitis, neg H pylori     HC UGI ENDOSCOPY, SIMPLE EXAM  01/20/2005    Esophagogastroduodenoscopy, colonoscopy with biopsies.  [facility]     HC UGI ENDOSCOPY, SIMPLE EXAM  7/1/2009    [facility], [facility].      UGI ENDOSCOPY, SIMPLE EXAM  11/11/2009    attempted upper GI, pt. could not tolerate procedure:[facility]     ORTHOPEDIC SURGERY  October 19,2011    diskectomy L4-L5       Family History:    Family History   Problem Relation Age of Onset     Gastrointestinal Disease Brother         severe Crohn's     Neurologic Disorder Brother         Seizures post head injury     Depression Brother      Substance Abuse Brother      Genitourinary Problems Father         kidney stones     Diabetes Father      Heart Disease Father         Open heart surgery     Breast  Cancer Maternal Grandmother      Parkinsonism Maternal Grandmother      Cerebrovascular Disease Paternal Grandmother      Cancer Maternal Grandfather         Lung     Cardiovascular Paternal Grandfather         Heart Attack     Substance Abuse Mother         in recovery x1 year      Lung Cancer Paternal Uncle      Cancer Paternal Uncle      Lung Cancer Maternal Uncle        Social History:  Marital Status:  Single [1]  Social History     Tobacco Use     Smoking status: Current Every Day Smoker     Packs/day: 0.50     Years: 5.00     Pack years: 2.50     Types: Cigarettes     Smokeless tobacco: Never Used   Substance Use Topics     Alcohol use: Not Currently     Comment: Not since last July     Drug use: Not Currently     Types: Marijuana     Comment: Medical Marijuana currently-- More CDB : quit to expensive         Medications:      acetaminophen (TYLENOL) 500 MG tablet   albuterol (2.5 MG/3ML) 0.083% neb solution   albuterol (PROAIR HFA/PROVENTIL HFA/VENTOLIN HFA) 108 (90 Base) MCG/ACT Inhaler   ARIPiprazole (ABILIFY) 15 MG tablet   aspirin (ASA) 81 MG tablet   busPIRone (BUSPAR) 15 MG tablet   clonazePAM (KLONOPIN) 0.5 MG tablet   cyclobenzaprine (FLEXERIL) 10 MG tablet   dicyclomine (BENTYL) 20 MG tablet   diphenhydrAMINE (BENADRYL) 50 MG capsule   DULoxetine (CYMBALTA) 60 MG EC capsule   EPINEPHrine (EPIPEN/ADRENACLICK/OR ANY BX GENERIC EQUIV) 0.3 MG/0.3ML injection 2-pack   fentaNYL (DURAGESIC) 12 mcg/hr 72 hr patch   gabapentin (NEURONTIN) 300 MG capsule   levocetirizine (XYZAL) 5 MG tablet   losartan (COZAAR) 50 MG tablet   medical cannabis (Patient's own supply)   medroxyPROGESTERone (DEPO-PROVERA) 150 MG/ML IM injection   methocarbamol (ROBAXIN) 750 MG tablet   metoprolol succinate ER (TOPROL-XL) 100 MG 24 hr tablet   mupirocin (BACTROBAN) 2 % external ointment   nicotine (NICODERM CQ) 14 MG/24HR 24 hr patch   NONFORMULARY   nystatin (MYCOSTATIN) cream   pantoprazole (PROTONIX) 40 MG EC tablet   prazosin  "(MINIPRESS) 1 MG capsule   prochlorperazine (COMPAZINE) 10 MG tablet   prochlorperazine (COMPAZINE) 25 MG suppository   rizatriptan (MAXALT-MLT) 5 MG ODT tab   sucralfate (CARAFATE) 1 GM tablet         Review of Systems   Constitutional: Negative for activity change, appetite change, fatigue and fever.   HENT: Negative.    Eyes: Negative for redness.   Respiratory: Negative.    Cardiovascular: Positive for chest pain. Negative for palpitations and leg swelling.   Gastrointestinal: Positive for abdominal pain and nausea.   Endocrine: Negative.    Genitourinary: Negative.    Musculoskeletal: Positive for arthralgias and back pain.   Skin: Negative.    Allergic/Immunologic: Negative for immunocompromised state.   Neurological: Negative.    Hematological: Negative.    Psychiatric/Behavioral: Negative for confusion.       Physical Exam   BP: (!) 154/110  Pulse: 124  Temp: 99.7  F (37.6  C)  Resp: 16  Height: 157.5 cm (5' 2\")  Weight: 81.6 kg (180 lb)  SpO2: 99 %      Physical Exam   Constitutional: She is oriented to person, place, and time. She appears well-developed and well-nourished. No distress.   HENT:   Head: Normocephalic and atraumatic.   Eyes: Conjunctivae and lids are normal.   Neck: Normal range of motion, full passive range of motion without pain and phonation normal. Neck supple. Carotid bruit is not present.   Cardiovascular: Regular rhythm. Tachycardia present. Exam reveals no gallop and no friction rub.      No systolic murmur is present.  Pulses:       Carotid pulses are 2+ on the right side, and 2+ on the left side.       Radial pulses are 2+ on the right side, and 2+ on the left side.   Pulmonary/Chest: Effort normal and breath sounds normal. She has no decreased breath sounds.   Abdominal: Soft. Bowel sounds are normal. She exhibits no distension. There is no tenderness.   Musculoskeletal:        Right lower leg: She exhibits no edema.        Left lower leg: She exhibits no edema.   Neurological: She " is alert and oriented to person, place, and time.   Skin: Skin is warm and dry.   Psychiatric: She has a normal mood and affect. Her behavior is normal.   Nursing note and vitals reviewed.      ED Course  Discussed with patient plan to get a cardiac evaluation and check ddimer for PE.  She will get baby aspirin as she reports she is able to take aspirin. Will give one nitroglycerine to see if helps with pain as /110 which could also be related to a bit of anxiety.     Reviewed EMR and patient was seen 7/18/2019 at [facility] for worsening chronic back pain and patient was declined further pain medications and left disgruntled according to note.     1445: Patient reports chest pain has not improved with nitroglycerine however BP did improve. Discussed GI cocktail and patient declined. She requests Dilaudid after I left room to the nurse and she instructed to patient she is on pain medication contract and at this time provider will not be giving Dilaudid.  I support the nurses comment as patient has a fentanyl patch.      1455: Patient reports she is ready for discharge. Will have patient sign out AMA as I do not have a second troponin to see if there elevation in regards to possible ACS as cause for chest pain. Patient understands risk for death and disability by not completing evaluation.   Patient signed out AMA.         Procedures               EKG Interpretation:      Interpreted by Nina Wilson  Time reviewed: 1346  Symptoms at time of EKG: chest pain   Rhythm: sinus  Rate: tachycardia 102bpm  Axis: normal  Ectopy: none  Conduction: normal  ST Segments/ T Waves: No ST-T wave changes  Q Waves: none  Comparison to prior: Unchanged from 7/18/2019    Clinical Impression: abnormal EKG          Critical Care time:  none               Results for orders placed or performed during the hospital encounter of 07/20/19 (from the past 24 hour(s))   CBC with platelets differential   Result Value Ref Range    WBC 7.7  4.0 - 11.0 10e9/L    RBC Count 4.18 3.8 - 5.2 10e12/L    Hemoglobin 13.8 11.7 - 15.7 g/dL    Hematocrit 38.9 35.0 - 47.0 %    MCV 93 78 - 100 fl    MCH 33.0 26.5 - 33.0 pg    MCHC 35.5 31.5 - 36.5 g/dL    RDW 12.3 10.0 - 15.0 %    Platelet Count 259 150 - 450 10e9/L    Diff Method Automated Method     % Neutrophils 73.8 %    % Lymphocytes 17.6 %    % Monocytes 7.9 %    % Eosinophils 0.3 %    % Basophils 0.3 %    % Immature Granulocytes 0.1 %    Nucleated RBCs 0 0 /100    Absolute Neutrophil 5.7 1.6 - 8.3 10e9/L    Absolute Lymphocytes 1.4 0.8 - 5.3 10e9/L    Absolute Monocytes 0.6 0.0 - 1.3 10e9/L    Absolute Basophils 0.0 0.0 - 0.2 10e9/L    Abs Immature Granulocytes 0.0 0 - 0.4 10e9/L    Absolute Nucleated RBC 0.0    D dimer quantitative   Result Value Ref Range    D Dimer 0.3 0.0 - 0.50 ug/ml FEU   INR   Result Value Ref Range    INR 0.99 0.86 - 1.14   Comprehensive metabolic panel   Result Value Ref Range    Sodium 143 133 - 144 mmol/L    Potassium 3.8 3.4 - 5.3 mmol/L    Chloride 108 94 - 109 mmol/L    Carbon Dioxide 28 20 - 32 mmol/L    Anion Gap 7 3 - 14 mmol/L    Glucose 91 70 - 99 mg/dL    Urea Nitrogen 13 7 - 30 mg/dL    Creatinine 0.75 0.52 - 1.04 mg/dL    GFR Estimate >90 >60 mL/min/[1.73_m2]    GFR Estimate If Black >90 >60 mL/min/[1.73_m2]    Calcium 8.9 8.5 - 10.1 mg/dL    Bilirubin Total 0.4 0.2 - 1.3 mg/dL    Albumin 3.7 3.4 - 5.0 g/dL    Protein Total 7.3 6.8 - 8.8 g/dL    Alkaline Phosphatase 91 40 - 150 U/L    ALT 27 0 - 50 U/L    AST 13 0 - 45 U/L   Troponin I   Result Value Ref Range    Troponin I ES <0.015 0.000 - 0.045 ug/L   Lipase   Result Value Ref Range    Lipase 128 73 - 393 U/L   XR Chest 2 Views    Narrative    CHEST TWO VIEWS 7/20/2019 2:21 PM     HISTORY:  Left chest wall pain.    COMPARISON: November 7, 2016.    FINDINGS: No pneumothorax. There are no acute infiltrates. The cardiac  silhouette is not enlarged. Pulmonary vasculature is unremarkable.       Impression    IMPRESSION: No  acute disease.   UA reflex to Microscopic and Culture   Result Value Ref Range    Color Urine Yellow     Appearance Urine Clear     Glucose Urine Negative NEG^Negative mg/dL    Bilirubin Urine Negative NEG^Negative    Ketones Urine Negative NEG^Negative mg/dL    Specific Gravity Urine 1.014 1.003 - 1.035    Blood Urine Negative NEG^Negative    pH Urine 7.0 5.0 - 7.0 pH    Protein Albumin Urine Negative NEG^Negative mg/dL    Urobilinogen mg/dL 0.0 0.0 - 2.0 mg/dL    Nitrite Urine Negative NEG^Negative    Leukocyte Esterase Urine Negative NEG^Negative    Source Midstream Urine      *Note: Due to a large number of results and/or encounters for the requested time period, some results have not been displayed. A complete set of results can be found in Results Review.       Medications   sodium chloride 0.9% infusion (125 mL/hr Intravenous New Bag 7/20/19 1432)   ondansetron (ZOFRAN) injection 4 mg (has no administration in time range)   aspirin (ASA) chewable tablet 324 mg (324 mg Oral Given 7/20/19 1410)   nitroGLYcerin (NITROSTAT) sublingual tablet 0.4 mg (0.4 mg Sublingual Given 7/20/19 1429)   diphenhydrAMINE (BENADRYL) injection 25 mg (25 mg Intravenous Given 7/20/19 1437)       Assessments & Plan (with Medical Decision Making)     I have reviewed the nursing notes.    I have reviewed the findings, diagnosis, plan and need for follow up with the patient.          Medication List      There are no discharge medications for this visit.         Final diagnoses:   Acute chest pain       7/20/2019   [facility] EMERGENCY DEPARTMENT     Nina Wilson APRN CNP  07/20/19 1508       Nina Wilson APRN CNP  07/20/19 [id number]

## 2019-07-20 NOTE — ED TRIAGE NOTES
Patient said she started to have chest pain about 10 am. Pointed to left chest above breast, states it is going down her left arm. Called nurse line and informed to come to the ER via ambulance patient chose to drive herself.

## 2019-07-20 NOTE — ED PROVIDER NOTES
History     Chief Complaint   Patient presents with     Hypertension     HPI  Katy Islas is a 29 year old female who was seen here in ED by me earlier this afternoon with c/o left chest wall pain and HTN onset at 1000 today. She signed out AMA after I did not give Dilaudid for pain and prior to second troponin. She reports she moved furniture when she got home and after took her BP and noted it was 170/103.  She asks for pain medication. Instructed we will proceed with repeat EKG and troponin. Will give 25mg Losartan for BP as she is on 50mg daily.  If second EKG and troponin within normal limits suspect MSK in nature. She is to take home pain medications as her PCP has directed.  She is to also follow up with PCP regarding Bp.   See chart from earlier today.     Allergies:  Allergies   Allergen Reactions     Iohexol Hives     Other reaction(s): Hives  IV contrast; patient states she tolerates contrast if she gets benadryl before  IV contrast; patient states she tolerates contrast if she gets benadryl before     Ativan [Lorazepam] Hives     At the IV site     Baclofen      hives     Bees Hives, Swelling and Difficulty breathing     Caffeine      Contrast Dye      Hives,   Updated 5/10/2016 CT Contrast.     Iodine Hives     Methocarbamol Swelling     Metoclopramide      Other reaction(s): Tremors  LW Reaction: shaking/sweating     Midazolam      Other reaction(s): Agitation     Monosodium Glutamate      Morphine Other (See Comments)     Difficulty with urination     Nsaids Other (See Comments)     GI BLEED x2     Other (Do Not Use) Other (See Comments)     Xanaflex- pt becomes disoriented and loses bladder control     Reglan [Metoclopramide Hcl] Other (See Comments)     shaking     Soma [Carisoprodol] Visual Disturbance     Sleep walking     Tizanidine      Topamax Other (See Comments)     Topamax [Topiramate] Nausea     Tingling  GI/Vomit     Tramadol      Severe Headache, Seizure Risk     Tylenol W/Codeine  [Acetaminophen-Codeine] Nausea and Itching     Tylenol 3     Valium Other (See Comments)     Becomes aggressive and angry     Versed Other (See Comments)     Zolmitriptan      Makes face feel like its twitching     Droperidol Anxiety     Flu Virus Vaccine Rash      Arm swelling       Problem List:    Patient Active Problem List    Diagnosis Date Noted     Bulge of cervical disc without myelopathy 04/24/2019     Priority: Medium     Cervicalgia 04/17/2019     Priority: Medium     Class 1 obesity due to excess calories without serious comorbidity with body mass index (BMI) of 32.0 to 32.9 in adult 04/03/2019     Priority: Medium     Hypophosphatemia 11/01/2018     Priority: Medium     Patellar maltracking, right 09/05/2018     Priority: Medium     Right anterior knee pain 09/05/2018     Priority: Medium     Melanocytic nevus, unspecified location 07/23/2018     Priority: Medium     Dysplastic skin lesion 07/23/2018     Priority: Medium     Iliotibial band syndrome affecting lower leg, right 12/21/2017     Priority: Medium     Chondromalacia of right patellofemoral joint 12/21/2017     Priority: Medium     Upper GI bleed - suspected 07/01/2017     Priority: Medium     Tobacco use disorder 07/01/2017     Priority: Medium     History of pseudoseizure 07/01/2017     Priority: Medium     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) 03/10/2017     Priority: Medium     Lumbar disc disease with radiculopathy 02/23/2017     Priority: Medium     S/P lumbar fusion 02/23/2017     Priority: Medium     Dr. YOVANI Millan, 2/23/17       Vitamin D deficiency 01/06/2017     Priority: Medium     Atypical mole 01/06/2017     Priority: Medium     Mild persistent asthma without complication 12/02/2016     Priority: Medium     Chronic bilateral low back pain with left-sided sciatica 12/02/2016     Priority: Medium     Bee sting allergy 09/16/2016     Priority: Medium     Gastroesophageal reflux disease without esophagitis 08/26/2016      Priority: Medium     Herpes simplex virus infection 11/12/2015     Priority: Medium     Adjustment disorder with mixed anxiety and depressed mood 10/14/2015     Priority: Medium     Marijuana abuse 02/22/2015     Priority: Medium     Bipolar disorder (H) 01/31/2013     Priority: Medium     Problem list name updated by automated process. Provider to review       Chronic pain 02/09/2012     Priority: Medium     Patient is followed by Aura Rosario PA-C for ongoing prescription of pain medication.  All refills should only be approved by this provider, or covering partner.    Medication(s): Norco    Maximum quantity per month: 70  Clinic visit frequency required: Q 2 months     Controlled substance agreement:   Discussed and signed 4/25/17    Encounter-Level CSA - 01/12/2015:                 Controlled Substance Agreement - Scan on 1/26/2015  9:14 AM : Controlled Medication Agreement-01/12/15 (below)            Pain Clinic evaluation in the past: Yes       Date/Location:   [facility], fall 2016    DIRE Total Score(s):    4/25/2017   Total Score 17       Last [facility] website verification:  done on 4/25/17 by LOVE Maki   [email or web address]           Anxiety 09/22/2011     Priority: Medium     Mild major depression (H) 08/29/2011     Priority: Medium     Mild intermittent asthma 10/12/2009     Priority: Medium     Overview:   Formatting of this note might be different from the original.  Action plan: See letter 4/10/2013   ATAQ:   Asthma Data 4/10/2013   Date completed 4/10/2013   Missed daily activities no = 0   Wake at night no = 0   Believe asthma in control yes = 0   ARIN use yes   Maximum ARIN use in 1 day 1-4 = 0   ATAQ score 0 = well controlled   Asthma ED visits in past 12 mos 0   Asthma hospitalizations in past 12 mos 0     Current Outpatient Prescriptions   Medication Sig     albuterol (PROVENTIL) 0.083 % neb solution Inhale 3 mL via a nebulizer every 4 hours if needed for  Shortness Of Breath.        Crohn's colitis (H) 08/04/2009     Priority: Medium     Dysmenorrhea 05/11/2007     Priority: Medium     Benign neoplasm of skin of lower limb, including hip 03/16/2004     Priority: Medium     Migraine      Priority: Medium     Dr. Farrar - Neurology.  Now on Inderal.  Seems to be working.  Follow-up 9/08  Problem list name updated by automated process. Provider to review       Hearing loss      Priority: Medium     Congenital  Problem list name updated by automated process. Provider to review       Allergic rhinitis      Priority: Medium     Problem list name updated by automated process. Provider to review          Past Medical History:    Past Medical History:   Diagnosis Date     Abdominal pain 10/31- 11/4/2005     Allergic rhinitis, cause unspecified      Arthritis      C. 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, without mention of intractable epilepsy      Migraine 07/21/12     Migraine, unspecified, without mention of intractable migraine without mention of status migrainosus      Mild intermittent asthma      Mycoplasma infection in conditions classified elsewhere and of unspecified site      Other chronic pain      Renal disease      Unspecified hearing loss        Past Surgical History:    Past Surgical History:   Procedure Laterality Date     AS REMOVAL OF COCCYX  10/18/2017     C FUSION OF SACROILIAC JOINT  10/26/2018     COLONOSCOPY  7/1/2009    [facility], [facility].     COMBINED ESOPHAGOSCOPY, GASTROSCOPY, DUODENOSCOPY (EGD) WITH CO2 INSUFFLATION N/A 4/12/2019    Procedure: COMBINED ESOPHAGOSCOPY, GASTROSCOPY, DUODENOSCOPY (EGD) WITH CO2 INSUFFLATION;   Surgeon: Edwin Conde MD;  Location: [facility] OR     ESOPHAGOSCOPY, GASTROSCOPY, DUODENOSCOPY (EGD), COMBINED N/A 4/12/2019    Procedure: Combined Esophagoscopy, Gastroscopy, Duodenoscopy (Egd), Biopsy Single Or Multiple;  Surgeon: Edwin Conde MD;  Location: [facility] OR     FUSION SPINE POSTERIOR MINIMALLY INVASIVE ONE LEVEL N/A 2/23/2017    Procedure: FUSION SPINE POSTERIOR MINIMALLY INVASIVE ONE LEVEL;  L4-5 Oblique Lateral Lumbar Interbody Fusion   Epidural steroid injection.   Transpedicular Bone marrow aspiration;  Surgeon: Jeniffer Eugene MD;  Location: [facility] OR     HC COLONOSCOPY THRU STOMA WITH BIOPSY  10/29/2003    Impression is that of normal appearing colonoscopy, without evidence of rectal bleeding.     HC COLONOSCOPY THRU STOMA, DIAGNOSTIC  10/00    normal     HC COLONOSCOPY THRU STOMA, DIAGNOSTIC  Oct 2009    Dr López- normal     HC EEG AWAKE AND SLEEP      abnormal     HC MRI BRAIN W/O CONTRAST  12/00    normal     HC REMOVAL GALLBLADDER  8/5/2009    [facility], [facility].     HC UGI ENDOSCOPY DIAG W BIOPSY  11/11/09    Normal esophagus     HC UGI ENDOSCOPY, SIMPLE EXAM  7/00, 10/00    mild chronic esophagitis and duodenitis, neg H pylori     HC UGI ENDOSCOPY, SIMPLE EXAM  01/20/2005    Esophagogastroduodenoscopy, colonoscopy with biopsies.  [facility] UGI ENDOSCOPY, SIMPLE EXAM  7/1/2009    [facility], [facility].      UGI ENDOSCOPY, SIMPLE EXAM  11/11/2009    attempted upper GI, pt. could not tolerate procedure:[facility]     ORTHOPEDIC SURGERY  October 19,2011    diskectomy L4-L5       Family History:    Family History   Problem Relation Age of Onset     Gastrointestinal Disease Brother         severe Crohn's     Neurologic Disorder Brother         Seizures post head injury     Depression Brother      Substance Abuse Brother      Genitourinary Problems Father         kidney stones     Diabetes Father      Heart Disease Father          Open heart surgery     Breast Cancer Maternal Grandmother      Parkinsonism Maternal Grandmother      Cerebrovascular Disease Paternal Grandmother      Cancer Maternal Grandfather         Lung     Cardiovascular Paternal Grandfather         Heart Attack     Substance Abuse Mother         in recovery x1 year      Lung Cancer Paternal Uncle      Cancer Paternal Uncle      Lung Cancer Maternal Uncle        Social History:  Marital Status:  Single [1]  Social History     Tobacco Use     Smoking status: Current Every Day Smoker     Packs/day: 0.50     Years: 5.00     Pack years: 2.50     Types: Cigarettes     Smokeless tobacco: Never Used   Substance Use Topics     Alcohol use: Not Currently     Comment: Not since last July     Drug use: Not Currently     Types: Marijuana     Comment: Medical Marijuana currently-- More CDB : quit to expensive         Medications:      acetaminophen (TYLENOL) 500 MG tablet   albuterol (2.5 MG/3ML) 0.083% neb solution   albuterol (PROAIR HFA/PROVENTIL HFA/VENTOLIN HFA) 108 (90 Base) MCG/ACT Inhaler   ARIPiprazole (ABILIFY) 15 MG tablet   aspirin (ASA) 81 MG tablet   busPIRone (BUSPAR) 15 MG tablet   clonazePAM (KLONOPIN) 0.5 MG tablet   cyclobenzaprine (FLEXERIL) 10 MG tablet   dicyclomine (BENTYL) 20 MG tablet   diphenhydrAMINE (BENADRYL) 50 MG capsule   DULoxetine (CYMBALTA) 60 MG EC capsule   EPINEPHrine (EPIPEN/ADRENACLICK/OR ANY BX GENERIC EQUIV) 0.3 MG/0.3ML injection 2-pack   fentaNYL (DURAGESIC) 12 mcg/hr 72 hr patch   gabapentin (NEURONTIN) 300 MG capsule   levocetirizine (XYZAL) 5 MG tablet   losartan (COZAAR) 50 MG tablet   medical cannabis (Patient's own supply)   medroxyPROGESTERone (DEPO-PROVERA) 150 MG/ML IM injection   methocarbamol (ROBAXIN) 750 MG tablet   metoprolol succinate ER (TOPROL-XL) 100 MG 24 hr tablet   mupirocin (BACTROBAN) 2 % external ointment   nicotine (NICODERM CQ) 14 MG/24HR 24 hr patch   NONFORMULARY   nystatin (MYCOSTATIN) cream   pantoprazole  (PROTONIX) 40 MG EC tablet   prazosin (MINIPRESS) 1 MG capsule   prochlorperazine (COMPAZINE) 10 MG tablet   prochlorperazine (COMPAZINE) 25 MG suppository   rizatriptan (MAXALT-MLT) 5 MG ODT tab   sucralfate (CARAFATE) 1 GM tablet         Review of Systems   Constitutional: Positive for fatigue.   Respiratory: Negative for shortness of breath.    Cardiovascular: Positive for chest pain.   Gastrointestinal: Negative for nausea.   Musculoskeletal: Positive for arthralgias and back pain.       Physical Exam   BP: (!) 147/99  Pulse: 119  Resp: 20  Weight: 77.1 kg (170 lb)  SpO2: 99 %      Physical Exam   Constitutional: She appears well-developed and well-nourished.   HENT:   Head: Normocephalic and atraumatic.   Eyes: Conjunctivae are normal.   Neck: Neck supple.   Cardiovascular: Regular rhythm, S1 normal, S2 normal, normal heart sounds and intact distal pulses. Tachycardia present.   Pulmonary/Chest: Effort normal and breath sounds normal.           ED Course  BP responded well to 25mg of Losartan.  She has pain to left pectoral muscle with palpation. Reports now she was lifting an air conditioner and then moved table today which she may have strained herself.  She has an appointment this Monday per her report with her PCP.  She is to take another 25mg of Losartan in evening tomorrow if BP still elevated. She then to [facility] with Aura Rosario Monday.         Procedures               Critical Care time:  none   EK  Symptoms: left chest wall pain  Normal sinus rhythm at 92bpm, no ST changes, normal conduction, normal ekg              Results for orders placed or performed during the hospital encounter of 19 (from the past 24 hour(s))   Troponin I   Result Value Ref Range    Troponin I ES <0.015 0.000 - 0.045 ug/L     *Note: Due to a large number of results and/or encounters for the requested time period, some results have not been displayed. A complete set of results can be found in  Results Review.       Medications   losartan (COZAAR) tablet 25 mg (25 mg Oral Given 7/20/19 3794)       Assessments & Plan (with Medical Decision Making)     I have reviewed the nursing notes.    I have reviewed the findings, diagnosis, plan and need for follow up with the patient.          Medication List      There are no discharge medications for this visit.         Final diagnoses:   Chest pain   Elevated BP without diagnosis of hypertension       7/20/2019   [facility] EMERGENCY DEPARTMENT     Nina Wilson APRN CNP  07/20/19 1839       Nina Wilson APRN CNP  07/20/19 1845

## 2019-07-20 NOTE — ED TRIAGE NOTES
Patient states she was in ED earlier and left AMA. She checked her blood pressure at home and was found to be 170/103. She states she called and was instructed to return to ED.

## 2019-07-20 NOTE — TELEPHONE ENCOUNTER
FNA triage call :   Presenting problem : Seen in ED 7/18/19  for Back pain and  Hypertension .    Katya Relay #[id number] interpretor calling with Pt .   PCP is    Aura Rosario PA-C   Physician Assistant - Medical  @ [facility]      Normally BP is  120/70 but  last few weeks  Has been elevated  .  Currently : back pain is constant  5/10  On pain scale  & tx  With warm baths and  CBD cream  And fentanyl patch  With improvement in  Pain level  Since ED visit .   BP is worse with standing and walking around  , which  corelated to increase back pain with standing and walking.  Has follow up appt on 7/22/19 @9am with PCP for follow up with pain . Pt  plans on calling for  appt for  injection in back next week at [facility] . Pt  Seen @ [facility]  on Friday but didn't have time for injection.   .Currently : taking BP Rx , monitoring her pulse and BP as ordered  .   Currenty :/101   And Pulse is 129 and  Having new  chest pain > 5 minutes duration,  today  .  Hx of sleep apnea and  Woke concerned about  breathing last night but no breathing problem since  ( 2nd to fentanyl patch ???  And apnea ) .   Guideline used : High Blood pressure A AH   Disposition and recommendations : call 911 and Pt will have someone drive her to [place] ED  Instead.   Caller verbalizes understanding and denies further questions and will call back if further symptoms to triage or questions  . Veronica Romero RN  - [facility] Nurse Advisor   High 155/101 @ 1200 noon , Back pain and     Reason for Disposition    [1] Chest pain lasts > 5 minutes AND [2] history of heart disease  (i.e., heart attack, bypass surgery, angina, angioplasty, CHF)    Additional Information    Negative: Difficult to awaken or acting confused (e.g., disoriented, slurred speech)    Negative: Severe difficulty breathing (e.g., struggling for each breath, speaks in single words)    Negative: [1] Weakness of the face, arm or leg on one  side of the body AND [2] new onset    Negative: [1] Numbness (i.e., loss of sensation) of the face, arm or leg on one side of the body AND [2] new onset    Protocols used: HIGH BLOOD PRESSURE-A-AH

## 2019-07-20 NOTE — ED AVS SNAPSHOT
[facility] Emergency Department  [address] [place] [address]  Phone:  [phone]  Fax:  [phone]                                    Katy Islas   MRN: [id number]    Department:  [facility] Emergency Department   Date of Visit:  7/20/2019           After Visit Summary Signature Page    I have received my discharge instructions, and my questions have been answered. I have discussed any challenges I see with this plan with the nurse or doctor.    ..........................................................................................................................................  Patient/Patient Representative Signature      ..........................................................................................................................................  Patient Representative Print Name and Relationship to Patient    ..................................................               ................................................  Date                                   Time    ..........................................................................................................................................  Reviewed by Signature/Title    ...................................................              ..............................................  Date                                               Time          22EPIC Rev 08/18

## 2019-07-20 NOTE — ED AVS SNAPSHOT
[facility] Emergency Department  [address] [place] [address]  Phone:  [phone]  Fax:  [phone]                                    Katy Islas   MRN: [id number]    Department:  [facility] Emergency Department   Date of Visit:  7/20/2019           After Visit Summary Signature Page    I have received my discharge instructions, and my questions have been answered. I have discussed any challenges I see with this plan with the nurse or doctor.    ..........................................................................................................................................  Patient/Patient Representative Signature      ..........................................................................................................................................  Patient Representative Print Name and Relationship to Patient    ..................................................               ................................................  Date                                   Time    ..........................................................................................................................................  Reviewed by Signature/Title    ...................................................              ..............................................  Date                                               Time          22EPIC Rev 08/18

## 2019-07-22 ENCOUNTER — OFFICE VISIT (OUTPATIENT)
Dept: FAMILY MEDICINE | Facility: OTHER | Age: 29
End: 2019-07-22
Payer: MEDICARE

## 2019-07-22 VITALS
SYSTOLIC BLOOD PRESSURE: 128 MMHG | TEMPERATURE: 98.4 F | HEART RATE: 94 BPM | RESPIRATION RATE: 18 BRPM | OXYGEN SATURATION: 100 % | HEIGHT: 62 IN | WEIGHT: 175.4 LBS | BODY MASS INDEX: 32.28 KG/M2 | DIASTOLIC BLOOD PRESSURE: 90 MMHG

## 2019-07-22 DIAGNOSIS — I10 HYPERTENSION GOAL BP (BLOOD PRESSURE) < 130/80: ICD-10-CM

## 2019-07-22 DIAGNOSIS — M54.42 CHRONIC BILATERAL LOW BACK PAIN WITH LEFT-SIDED SCIATICA: ICD-10-CM

## 2019-07-22 DIAGNOSIS — G89.4 CHRONIC PAIN SYNDROME: ICD-10-CM

## 2019-07-22 DIAGNOSIS — G89.29 CHRONIC BILATERAL LOW BACK PAIN WITH LEFT-SIDED SCIATICA: ICD-10-CM

## 2019-07-22 PROCEDURE — 99214 OFFICE O/P EST MOD 30 MIN: CPT | Performed by: PHYSICIAN ASSISTANT

## 2019-07-22 RX ORDER — FENTANYL 12.5 UG/1
1 PATCH TRANSDERMAL
Qty: 10 PATCH | Refills: 0 | Status: CANCELLED | OUTPATIENT
Start: 2019-07-22

## 2019-07-22 RX ORDER — FENTANYL 25 UG/1
1 PATCH TRANSDERMAL
Qty: 10 PATCH | Refills: 0 | Status: SHIPPED | OUTPATIENT
Start: 2019-07-22 | End: 2019-08-05

## 2019-07-22 RX ORDER — LOSARTAN POTASSIUM 100 MG/1
100 TABLET ORAL DAILY
Qty: 90 TABLET | Refills: 3 | Status: SHIPPED | OUTPATIENT
Start: 2019-07-22 | End: 2020-06-01

## 2019-07-22 ASSESSMENT — ANXIETY QUESTIONNAIRES
6. BECOMING EASILY ANNOYED OR IRRITABLE: SEVERAL DAYS
7. FEELING AFRAID AS IF SOMETHING AWFUL MIGHT HAPPEN: NOT AT ALL
2. NOT BEING ABLE TO STOP OR CONTROL WORRYING: SEVERAL DAYS
1. FEELING NERVOUS, ANXIOUS, OR ON EDGE: NEARLY EVERY DAY
3. WORRYING TOO MUCH ABOUT DIFFERENT THINGS: SEVERAL DAYS
IF YOU CHECKED OFF ANY PROBLEMS ON THIS QUESTIONNAIRE, HOW DIFFICULT HAVE THESE PROBLEMS MADE IT FOR YOU TO DO YOUR WORK, TAKE CARE OF THINGS AT HOME, OR GET ALONG WITH OTHER PEOPLE: VERY DIFFICULT
5. BEING SO RESTLESS THAT IT IS HARD TO SIT STILL: SEVERAL DAYS
GAD7 TOTAL SCORE: 10

## 2019-07-22 ASSESSMENT — PATIENT HEALTH QUESTIONNAIRE - PHQ9
SUM OF ALL RESPONSES TO PHQ QUESTIONS 1-9: 2
5. POOR APPETITE OR OVEREATING: NEARLY EVERY DAY

## 2019-07-22 ASSESSMENT — MIFFLIN-ST. JEOR: SCORE: 1468.86

## 2019-07-22 ASSESSMENT — PAIN SCALES - GENERAL: PAINLEVEL: SEVERE PAIN (6)

## 2019-07-23 ENCOUNTER — PATIENT OUTREACH (OUTPATIENT)
Dept: CARE COORDINATION | Facility: CLINIC | Age: 29
End: 2019-07-23

## 2019-07-23 ASSESSMENT — ANXIETY QUESTIONNAIRES: GAD7 TOTAL SCORE: 10

## 2019-07-23 NOTE — PROGRESS NOTES
Clinic Care Coordination Contact  [facility]       Clinical Data: Care Coordinator Outreach  Outreach attempted x 1.  CC RN attempted to leave a VM message but was unable, message sated, we're sorry the person you are trying to call is unavailable.     Plan: Care Coordinator mailed out care coordination introduction letter on 5/8/19. Care Coordinator will try to reach patient again in 3-5 business days.    RADHA Otto RN Clinic Care Coordinator   [place], [place], [place]  Phone: [phone]

## 2019-07-25 ENCOUNTER — TELEPHONE (OUTPATIENT)
Dept: FAMILY MEDICINE | Facility: OTHER | Age: 29
End: 2019-07-25

## 2019-07-25 ENCOUNTER — MYC MEDICAL ADVICE (OUTPATIENT)
Dept: FAMILY MEDICINE | Facility: OTHER | Age: 29
End: 2019-07-25

## 2019-07-25 DIAGNOSIS — I10 HYPERTENSION GOAL BP (BLOOD PRESSURE) < 130/80: Primary | ICD-10-CM

## 2019-07-25 DIAGNOSIS — R06.83 SNORING: ICD-10-CM

## 2019-07-25 NOTE — PROGRESS NOTES
Clinic Care Coordination Contact  [facility]    Clinical Data: Care Coordinator Outreach  Outreach attempted x 2.  CC RN attempted to leave a VM message but was unable, message stated, we're sorry the person you are trying to call is unavailable.     Plan: Care Coordinator will do no further outreaches at this time.    EDYTA OttoN RN Clinic Care Coordinator   [place], [place], [place]  Phone: [phone]

## 2019-07-25 NOTE — TELEPHONE ENCOUNTER
Attempted to call patient and there was no answer, unable to leave VM. Will try later.   Jenni Luo MA

## 2019-07-25 NOTE — TELEPHONE ENCOUNTER
Spoke with Patient. She is wondering would it cause itching and how often can she be using benadryl? She needs a refill on her muscle relaxer's as well.     Blood pressures have been as follows:   (pt took while on phone) 131/84    Yesterday was:  98/54 or 96/56

## 2019-07-25 NOTE — TELEPHONE ENCOUNTER
Please call patient to clarify below and get BP numbers.     Zach Rosario PA-C  [facility]

## 2019-07-25 NOTE — TELEPHONE ENCOUNTER
Reason for Call:  Medication or medication refill:    Do you use a [facility]?  Name of the pharmacy and phone number for the current request:  [facility]    Name of the medication requested: fentanyl patch    Other request: please call patient to discuss fentanyl patch for itching. Also her BP is back to normal.    Can we leave a detailed message on this number? YES    Phone number patient can be reached at: Home number on file [phone] (home)    Best Time: any    Call taken on 7/25/2019 at 1:44 PM by Kenzie Maria

## 2019-07-26 NOTE — TELEPHONE ENCOUNTER
"Call back from [facility] sleep study scheduling.  They are unable to schedule her as her file states \"do not schedule\" appears to be from frequent no shows.  They were going to call her back and inform her.      Would you want to send her to [facility] [place]?   "

## 2019-07-31 NOTE — PROGRESS NOTES
Subjective     Katy Islas is a 29 year old female who presents to clinic today for the following health issues:    HPI   Chronic/Recurring Back Pain Follow Up      Where is your back pain located? (Select all that apply) low back both and left leg down to toes     How would you describe your back pain?  dull ache    Where does your back pain spread? the left  knee and the left foot    Since your last clinic visit for back pain, how has your pain changed? gradually improving    Does your back pain interfere with your job? Not applicable    Since your last visit, have you tried any new treatment? Yes -  seeing back surgon and possible injection    - Saw Dr. Millan last week, doesn't feel injection will help      He said try injection on both sides      Told nothing else he can do     - [facility] about above      Awaiting call back   - Awaiting physical therapy orders from above        - Nutrition on 8/7/19     - For her pain      Fentanyl 25 mg, for a few days was itchy but now better, doesn't stick well           Sometimes doesn't last 3 days       Yesterday pain was bad, today better      Break through medication - tylenol, robaxin, doesn't last long             Robaxin makes feel drunk     No Flexeril      Gabapentin at night         Trying to quit   - patches were $50, couldn't afford   - Will renew her medical marijuana       Amount of exercise or physical activity: 6-7 days/week for an average of 45-60 minutes    Problems taking medications regularly: No    Medication side effects: patch is making her tired- possible wanting to switch due to not sticking very well.     Diet: regular (no restrictions)      Hypertension Follow-up      Do you check your blood pressure regularly outside of the clinic? Yes     Are you following a low salt diet? Yes    Are your blood pressures ever more than 140 on the top number (systolic) OR more   than 90 on the bottom number (diastolic), for example  "140/90? No      Reviewed and updated as needed this visit by Provider  Allergies  Meds  Problems  Med Hx  Surg Hx         Review of Systems   ROS COMP: Constitutional, HEENT, cardiovascular, pulmonary, gi and gu systems are negative, except as otherwise noted.      Objective    /84   Pulse 88   Temp 97.9  F (36.6  C) (Temporal)   Ht 1.575 m (5' 2\")   Wt 79.8 kg (176 lb)   SpO2 99%   BMI 32.19 kg/m    Body mass index is 32.19 kg/m .  Physical Exam   GENERAL APPEARANCE: healthy, alert and no distress  EYES: Eyes grossly normal to inspection, PERRLA, conjunctivae and sclerae without injection or discharge, EOM intact   RESP: Lungs clear to auscultation - no rales, rhonchi or wheezes    CV: Regular rates and rhythm, normal S1 S2, no S3 or S4, no murmur, click or rub, no peripheral edema and peripheral pulses strong and symmetric bilaterally   MS: No musculoskeletal defects are noted and gait is age appropriate without ataxia   SKIN: No suspicious lesions or rashes, hydration status appears adeuqate with normal skin turgor   PSYCH: Alert and oriented x3; speech- coherent , normal rate and volume; able to articulate logical thoughts, able to abstract reason, no tangential thoughts, no hallucinations or delusions, mentation appears normal, Mood is euthymic. Affect is appropriate for this mood state and bright. Thought content is free of suicidal ideation, hallucinations, and delusions. Dress is adequate and upkept. Eye contact is good during conversation.       Diagnostic Test Results:  Labs reviewed in Epic        Assessment & Plan       ICD-10-CM    1. Chronic pain syndrome G89.4 fentaNYL (DURAGESIC) 25 mcg/hr 72 hr patch   2. Chronic bilateral low back pain with left-sided sciatica M54.42 cyclobenzaprine (FLEXERIL) 10 MG tablet    G89.29 fentaNYL (DURAGESIC) 25 mcg/hr 72 hr patch   3. Cervicalgia M54.2 Pain Drug Scr UR W Rptd Meds   4. Hypertension goal BP (blood pressure) < 130/80 I10 metoprolol " succinate ER (TOPROL-XL) 100 MG 24 hr tablet   5. Tobacco use disorder F17.200 TOBACCO CESSATION ORDER FOR    6. Dysmenorrhea N94.6 HCG Qual, Urine ([id number])        1-3. Pain  - Saw [facility] 7/18       [facility] recommend      Left L4-5-S1 trans foraminal injection recommended       No scheduled yet, awaiting approval       - As previously, patient with complicated spinal history including L4-5 disectomy (10/19/11), L4-5 fusion (2/23/17), coccyx fracture and removal (complicated by infection - 10/18/17), and SI Joint fusion (10/26/18)   - Records received from [facility] - Dr. 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 Millan with [facility]      He recommended she proceed with injections      - Received orders from initial evaluation at [facility]      Patient states sore but likes going and will continue          - Patient is on medical marijuana for mood/PTSD issues (following with counseling and psychiatry as well)   - Icing regularly along with stretches from previous physical therapy   - Also taking Gabapentin and Flexeril   -  reviewed   - CSA - 4/24/19   - Fentanyl patch 25 mg/72 hours            Discussed ways to keep it stuck (Skin Tac, Tegaderm), reports only 1 left, due to falling off (should have 4) will give refill early but this is a one time deal, needs to make sure they stay on      Reviewed use and side effects  including sedation and addiction       Discussed risks of overdose, declined narcan   - Recheck 2 weeks      3. HTN   - Recent diagnosis, now controlled      Losartan to 100 mg AND start Metoprolol 100 mg once a day in the morning      Continue without change   - Recheck 2 weeks      5.   Tobacco Cessation:   reports that she has been smoking cigarettes.  She has a 2.50 pack-year smoking history. She has never used smokeless tobacco.  Tobacco Cessation Action Plan: Information offered: Patient not interested at this time   - Patches were not covered and were too expensive  - Plans to pay for her medical Social[employer] renewal and use that to help stop smoking       The patient indicates understanding of these issues and agrees with the plan.    Return in about 2 weeks (around 8/19/2019).    Due to language barrier, an  was present during the history-taking and subsequent discussion (and for part of the physical exam) with this patient.      Aura Rosario PA-C  [facility]

## 2019-08-05 ENCOUNTER — OFFICE VISIT (OUTPATIENT)
Dept: FAMILY MEDICINE | Facility: OTHER | Age: 29
End: 2019-08-05
Payer: MEDICARE

## 2019-08-05 ENCOUNTER — TELEPHONE (OUTPATIENT)
Dept: FAMILY MEDICINE | Facility: OTHER | Age: 29
End: 2019-08-05

## 2019-08-05 VITALS
HEIGHT: 62 IN | WEIGHT: 176 LBS | TEMPERATURE: 97.9 F | BODY MASS INDEX: 32.39 KG/M2 | DIASTOLIC BLOOD PRESSURE: 84 MMHG | OXYGEN SATURATION: 99 % | HEART RATE: 88 BPM | SYSTOLIC BLOOD PRESSURE: 122 MMHG

## 2019-08-05 DIAGNOSIS — M54.2 CERVICALGIA: ICD-10-CM

## 2019-08-05 DIAGNOSIS — G89.29 CHRONIC BILATERAL LOW BACK PAIN WITH LEFT-SIDED SCIATICA: ICD-10-CM

## 2019-08-05 DIAGNOSIS — G89.4 CHRONIC PAIN SYNDROME: Primary | ICD-10-CM

## 2019-08-05 DIAGNOSIS — M54.42 CHRONIC BILATERAL LOW BACK PAIN WITH LEFT-SIDED SCIATICA: ICD-10-CM

## 2019-08-05 DIAGNOSIS — I10 HYPERTENSION GOAL BP (BLOOD PRESSURE) < 130/80: ICD-10-CM

## 2019-08-05 DIAGNOSIS — F17.200 TOBACCO USE DISORDER: ICD-10-CM

## 2019-08-05 DIAGNOSIS — N94.6 DYSMENORRHEA: ICD-10-CM

## 2019-08-05 LAB — HCG UR QL: NEGATIVE

## 2019-08-05 PROCEDURE — 81025 URINE PREGNANCY TEST: CPT | Performed by: PHYSICIAN ASSISTANT

## 2019-08-05 PROCEDURE — 80307 DRUG TEST PRSMV CHEM ANLYZR: CPT | Mod: 90 | Performed by: PHYSICIAN ASSISTANT

## 2019-08-05 PROCEDURE — 99214 OFFICE O/P EST MOD 30 MIN: CPT | Mod: 25 | Performed by: PHYSICIAN ASSISTANT

## 2019-08-05 PROCEDURE — 96372 THER/PROPH/DIAG INJ SC/IM: CPT | Performed by: PHYSICIAN ASSISTANT

## 2019-08-05 RX ORDER — CYCLOBENZAPRINE HCL 10 MG
10 TABLET ORAL 3 TIMES DAILY PRN
Qty: 90 TABLET | Refills: 0 | Status: SHIPPED | OUTPATIENT
Start: 2019-08-05 | End: 2019-11-12

## 2019-08-05 RX ORDER — METOPROLOL SUCCINATE 100 MG/1
100 TABLET, EXTENDED RELEASE ORAL DAILY
Qty: 90 TABLET | Refills: 1 | Status: SHIPPED | OUTPATIENT
Start: 2019-08-05 | End: 2019-09-18

## 2019-08-05 RX ORDER — MEDROXYPROGESTERONE ACETATE 150 MG/ML
150 INJECTION, SUSPENSION INTRAMUSCULAR
Status: ACTIVE | OUTPATIENT
Start: 2019-08-05 | End: 2020-07-30

## 2019-08-05 RX ORDER — METHOCARBAMOL 750 MG/1
750 TABLET, FILM COATED ORAL 4 TIMES DAILY PRN
Qty: 60 TABLET | Refills: 1 | Status: CANCELLED | OUTPATIENT
Start: 2019-08-05

## 2019-08-05 RX ORDER — FENTANYL 25 UG/1
1 PATCH TRANSDERMAL
Qty: 10 PATCH | Refills: 0 | Status: SHIPPED | OUTPATIENT
Start: 2019-08-09 | End: 2019-08-19

## 2019-08-05 RX ADMIN — MEDROXYPROGESTERONE ACETATE 150 MG: 150 INJECTION, SUSPENSION INTRAMUSCULAR at 11:31

## 2019-08-05 ASSESSMENT — ANXIETY QUESTIONNAIRES
GAD7 TOTAL SCORE: 7
GAD7 TOTAL SCORE: 7
1. FEELING NERVOUS, ANXIOUS, OR ON EDGE: SEVERAL DAYS
4. TROUBLE RELAXING: SEVERAL DAYS
GAD7 TOTAL SCORE: 7
2. NOT BEING ABLE TO STOP OR CONTROL WORRYING: SEVERAL DAYS
3. WORRYING TOO MUCH ABOUT DIFFERENT THINGS: SEVERAL DAYS
6. BECOMING EASILY ANNOYED OR IRRITABLE: NEARLY EVERY DAY
7. FEELING AFRAID AS IF SOMETHING AWFUL MIGHT HAPPEN: NOT AT ALL
5. BEING SO RESTLESS THAT IT IS HARD TO SIT STILL: NOT AT ALL
7. FEELING AFRAID AS IF SOMETHING AWFUL MIGHT HAPPEN: NOT AT ALL

## 2019-08-05 ASSESSMENT — PATIENT HEALTH QUESTIONNAIRE - PHQ9
10. IF YOU CHECKED OFF ANY PROBLEMS, HOW DIFFICULT HAVE THESE PROBLEMS MADE IT FOR YOU TO DO YOUR WORK, TAKE CARE OF THINGS AT HOME, OR GET ALONG WITH OTHER PEOPLE: NOT DIFFICULT AT ALL
SUM OF ALL RESPONSES TO PHQ QUESTIONS 1-9: 4
SUM OF ALL RESPONSES TO PHQ QUESTIONS 1-9: 4

## 2019-08-05 ASSESSMENT — PAIN SCALES - GENERAL: PAINLEVEL: MODERATE PAIN (4)

## 2019-08-05 ASSESSMENT — MIFFLIN-ST. JEOR: SCORE: 1471.58

## 2019-08-05 NOTE — TELEPHONE ENCOUNTER
Fax from [facility].  They have Rx for methocarbamol.  Her plan does not cover this.  They do cover tizanidine or cyclobenzaprine.

## 2019-08-05 NOTE — NURSING NOTE
Clinic Administered Medication Documentation      Depo Provera Documentation    Prior to injection, verified patient identity using patient's name and date of birth. Medication was administered. Please see MAR and medication order for additional information. Patient instructed to remain in clinic for 15 minutes and report any adverse reaction to staff immediately .    BP: 122/84    LAST PAP/EXAM:   Lab Results   Component Value Date    PAP NIL 04/25/2017     URINE HCG:negative    NEXT INJECTION DUE: 10/21/19 - 11/4/19    Was entire vial of medication used? Yes  Vial/Syringe: Single dose vial  Expiration Date:  06/2020

## 2019-08-05 NOTE — TELEPHONE ENCOUNTER
I did not order. I just saw her and sent cyclobenzaprine (Flexeril)    Zach Rosario PA-C  [facility]

## 2019-08-05 NOTE — TELEPHONE ENCOUNTER
Attempted to call pharmacy but kept getting disconnected. Will try again later.     Jeanna Servin MA

## 2019-08-06 ASSESSMENT — PATIENT HEALTH QUESTIONNAIRE - PHQ9: SUM OF ALL RESPONSES TO PHQ QUESTIONS 1-9: 4

## 2019-08-06 ASSESSMENT — ASTHMA QUESTIONNAIRES: ACT_TOTALSCORE: 25

## 2019-08-06 ASSESSMENT — ANXIETY QUESTIONNAIRES: GAD7 TOTAL SCORE: 7

## 2019-08-07 ENCOUNTER — OFFICE VISIT (OUTPATIENT)
Dept: NUTRITION | Facility: CLINIC | Age: 29
End: 2019-08-07
Attending: PHYSICIAN ASSISTANT
Payer: MEDICARE

## 2019-08-07 DIAGNOSIS — R19.7 DIARRHEA: ICD-10-CM

## 2019-08-07 DIAGNOSIS — K21.9 GASTROESOPHAGEAL REFLUX DISEASE WITHOUT ESOPHAGITIS: ICD-10-CM

## 2019-08-07 DIAGNOSIS — E66.09 CLASS 1 OBESITY DUE TO EXCESS CALORIES WITHOUT SERIOUS COMORBIDITY WITH BODY MASS INDEX (BMI) OF 32.0 TO 32.9 IN ADULT: ICD-10-CM

## 2019-08-07 DIAGNOSIS — K50.118 CROHN'S DISEASE OF COLON WITH OTHER COMPLICATION (H): Primary | ICD-10-CM

## 2019-08-07 DIAGNOSIS — R10.9 ABDOMINAL CRAMPING: ICD-10-CM

## 2019-08-07 DIAGNOSIS — K52.9 COLITIS: ICD-10-CM

## 2019-08-07 DIAGNOSIS — E66.811 CLASS 1 OBESITY DUE TO EXCESS CALORIES WITHOUT SERIOUS COMORBIDITY WITH BODY MASS INDEX (BMI) OF 32.0 TO 32.9 IN ADULT: ICD-10-CM

## 2019-08-07 PROCEDURE — 97802 MEDICAL NUTRITION INDIV IN: CPT | Performed by: DIETITIAN, REGISTERED

## 2019-08-07 NOTE — PROGRESS NOTES
Medical Nutrition Therapy  Visit Type:Initial assessment and intervention    Referring Provider: Junaid Pritchett  Internal (P) gastroenterology    REASON FOR REFERRAL:   Katy Islas presents today for MNT and education related to altered GI.   Diarrhea, unspecified type [R19.7]  - Primary       Abdominal cramping [R10.9]       Colitis [K52.9]   Crohn's disease of colon with other complication (H) [K50.118]  - Primary       Gastroesophageal reflux disease without esophagitis [K21.9]           She is accompanied by .     NUTRITION ASSESSMENT:   Patient comments/concerns relating to nutrition: Katy is a 29 year old female who is struggling with weight loss and reflux issues.  She has abdominal pain, nausea and vomiting. She experiences diarrhea cramping once to two times per month.     In the past she was 115lbs when homeless and crohn's disease had flared. In 2016 she moved back in with mom and that is when she gained 50lbs.  Her goal weight is to get to 130lb. She is really interested in learning how to eat right, she usually has two meals per day and doesn't really eat breakfast.       Food/Nutrition History:  Previous diet education:  No     Food allergies/sensitivities/intolerances: Stopped coffee because of heartburn as well as spicey foods.                                                                               Eating Patterns & Meal Planning:    Waking up at: 7:30am  Going to bed at: 9-10pm     Consuming Breakfast: No 7 times/week  skips       Consuming Lunch:  Yes 7 times/week  11-12pm deli sandwich, yogurt, water       Consuming Dinner:  Yes 7 times/week  When hungry if not hungry can't eat Steak potatoes and corn with juice      Snacks: Yes 7 times/week  When hungry sometimes crackers or fruit/yogurt doesn't really eat sweets causes heartburn and hurts teeth   Snacks during night sometimes wakes up in middle of night     Skips meals/snacks: Yes      Beverages: Yes 4-5 8oz cups water and  juice     Barriers around meals/snacks include:Motivation/Ready to change , Lack of social support, Lack of time , Lack of cooking skills , Lack of control over emotions/behaviors and Lack of nutrition knowlege      Diet is high in: carbs -fodmaps and inflammatory foods such as wheat/dairy  Diet is low in: fat (unsaturated), fiber, fruits, protein and vegetables    Grocery Shopping: Yes weekly    Reading Food Labels:  No  Barriers Include:Lack of nutrition knowlege    Cooking at home: Yes       Eats out: Yes   0-1 meals/per week   Barriers around eating/cooking Include:Motivation/Ready to change , Lack of time , Lack of control over emotions/behaviors and Lack of nutrition knowlege      Lifestyle Patterns:  Feels stressed with life right now: Yes    Barriers Include:personal reasons     no regular exercise program  Barriers Include:Motivation/Ready to change     Struggling with sleep: No    Average hours of daily sleep: 7-9 hours daily   Barriers Include:snores, snacks during night, sleep walking, feels tired    Social History:   Lives with: mother    Family History   Problem Relation Age of Onset     Gastrointestinal Disease Brother         severe Crohn's     Neurologic Disorder Brother         Seizures post head injury     Depression Brother      Substance Abuse Brother      Genitourinary Problems Father         kidney stones     Diabetes Father      Heart Disease Father         Open heart surgery     Breast Cancer Maternal Grandmother      Parkinsonism Maternal Grandmother      Cerebrovascular Disease Paternal Grandmother      Cancer Maternal Grandfather         Lung     Cardiovascular Paternal Grandfather         Heart Attack     Substance Abuse Mother         in recovery x1 year      Lung Cancer Paternal Uncle      Cancer Paternal Uncle      Lung Cancer Maternal Uncle           Pertinent Past Medical History:   I have reviewed this patient's medical history and updated it with pertinent information if needed.    Past Medical History:   Diagnosis Date     Abdominal pain 10/31- 11/4/2005    [facility]     Allergic rhinitis, cause unspecified     Allergic rhinitis     Arthritis      C. difficile diarrhea     Past, no current diarrhea.     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, without mention of intractable epilepsy     pseudoseizures diagnosed after extensive neurologic eval     Migraine 07/21/12    D/C 07/22/12-[facility]     Migraine, unspecified, without mention of intractable migraine without mention of status migrainosus     Migraine     Mild intermittent asthma     mild intermittent     Mycoplasma infection in conditions classified elsewhere and of unspecified site      Other chronic pain     Back pain for 6 years     Renal disease     Kidney stones     Unspecified hearing loss     congenital hearing loss         Previous Surgeries:   I have reviewed this patient's surgical history and updated it with pertinent information if needed.  Past Surgical History:   Procedure Laterality Date     AS REMOVAL OF COCCYX  10/18/2017     C FUSION OF SACROILIAC JOINT  10/26/2018     COLONOSCOPY  7/1/2009    [facility].     COLONOSCOPY N/A 7/17/2019    Procedure: Colonoscopy, With Polypectomy And Biopsy;  Surgeon: Edwin Conde MD;  Location: [facility] OR     COLONOSCOPY WITH CO2 INSUFFLATION N/A 7/17/2019    Procedure: COLONOSCOPY, WITH CO2 INSUFFLATION;  Surgeon: Edwin Conde MD;  Location: [facility] OR     COMBINED  ESOPHAGOSCOPY, GASTROSCOPY, DUODENOSCOPY (EGD) WITH CO2 INSUFFLATION N/A 4/12/2019    Procedure: COMBINED ESOPHAGOSCOPY, GASTROSCOPY, DUODENOSCOPY (EGD) WITH CO2 INSUFFLATION;  Surgeon: Edwin Conde MD;  Location: [facility] OR     ESOPHAGOSCOPY, GASTROSCOPY, DUODENOSCOPY (EGD), COMBINED N/A 4/12/2019    Procedure: Combined Esophagoscopy, Gastroscopy, Duodenoscopy (Egd), Biopsy Single Or Multiple;  Surgeon: Edwin Conde MD;  Location: [facility] OR     FUSION SPINE POSTERIOR MINIMALLY INVASIVE ONE LEVEL N/A 2/23/2017    Procedure: FUSION SPINE POSTERIOR MINIMALLY INVASIVE ONE LEVEL;  L4-5 Oblique Lateral Lumbar Interbody Fusion   Epidural steroid injection.   Transpedicular Bone marrow aspiration;  Surgeon: Jeniffer Eugene MD;  Location: [facility] OR     HC COLONOSCOPY THRU STOMA WITH BIOPSY  10/29/2003    Impression is that of normal appearing colonoscopy, without evidence of rectal bleeding.     HC COLONOSCOPY THRU STOMA, DIAGNOSTIC  10/00    normal     HC COLONOSCOPY THRU STOMA, DIAGNOSTIC  Oct 2009    Dr López- normal     HC EEG AWAKE AND SLEEP      abnormal     HC MRI BRAIN W/O CONTRAST  12/00    normal     HC REMOVAL GALLBLADDER  8/5/2009    [facility], [facility].     HC UGI ENDOSCOPY DIAG W BIOPSY  11/11/09    Normal esophagus     HC UGI ENDOSCOPY, SIMPLE EXAM  7/00, 10/00    mild chronic esophagitis and duodenitis, neg H pylori     HC UGI ENDOSCOPY, SIMPLE EXAM  01/20/2005    Esophagogastroduodenoscopy, colonoscopy with biopsies.  [facility] UGI ENDOSCOPY, SIMPLE EXAM  7/1/2009    [facility], [facility].      UGI ENDOSCOPY, SIMPLE EXAM  11/11/2009    attempted upper GI, pt. could not tolerate procedure:[facility]     ORTHOPEDIC SURGERY  October 19,2011    diskectomy L4-L5       Physical Findings:   Digestive System:stomach or abdominal pains, cramping , bloating , gas , heartburn/acid reflux or indigestion  and nausea     Normal Bowl Movements:  No    # of Bowl movements: 2-3x per day   Irregular Bowl Movements: formed and soft  and with blood visible     Medications and Supplements:  Current Outpatient Medications   Medication     acetaminophen (TYLENOL) 500 MG tablet     albuterol (2.5 MG/3ML) 0.083% neb solution     albuterol (PROAIR HFA/PROVENTIL HFA/VENTOLIN HFA) 108 (90 Base) MCG/ACT Inhaler     ARIPiprazole (ABILIFY) 15 MG tablet     aspirin (ASA) 81 MG tablet     busPIRone (BUSPAR) 15 MG tablet     clonazePAM (KLONOPIN) 0.5 MG tablet     cyclobenzaprine (FLEXERIL) 10 MG tablet     dicyclomine (BENTYL) 20 MG tablet     diphenhydrAMINE (BENADRYL) 50 MG capsule     DULoxetine (CYMBALTA) 60 MG EC capsule     EPINEPHrine (EPIPEN/ADRENACLICK/OR ANY BX GENERIC EQUIV) 0.3 MG/0.3ML injection 2-pack     [START ON 8/9/2019] fentaNYL (DURAGESIC) 25 mcg/hr 72 hr patch     gabapentin (NEURONTIN) 300 MG capsule     levocetirizine (XYZAL) 5 MG tablet     losartan (COZAAR) 100 MG tablet     medical cannabis (Patient's own supply)     medroxyPROGESTERone (DEPO-PROVERA) 150 MG/ML IM injection     metoprolol succinate ER (TOPROL-XL) 100 MG 24 hr tablet     mupirocin (BACTROBAN) 2 % external ointment     nicotine (NICODERM CQ) 14 MG/24HR 24 hr patch     NONFORMULARY     nystatin (MYCOSTATIN) cream     pantoprazole (PROTONIX) 40 MG EC tablet     prazosin (MINIPRESS) 1 MG capsule     prochlorperazine (COMPAZINE) 10 MG tablet     prochlorperazine (COMPAZINE) 25 MG suppository     rizatriptan (MAXALT-MLT) 5 MG ODT tab     sucralfate (CARAFATE) 1 GM tablet     Current Facility-Administered Medications   Medication     medroxyPROGESTERone (DEPO-PROVERA) injection 150 mg       LABS:  Last Basic Metabolic Panel:  Lab Results   Component Value Date     07/20/2019      Lab Results   Component Value Date    POTASSIUM 3.8 07/20/2019     Lab Results   Component Value Date    CHLORIDE 108 07/20/2019     Lab Results   Component Value Date    SARANYA 8.9 07/20/2019     Lab Results    Component Value Date    CO2 28 07/20/2019     Lab Results   Component Value Date    BUN 13 07/20/2019     Lab Results   Component Value Date    CR 0.75 07/20/2019     Lab Results   Component Value Date    GLC 91 07/20/2019       Last Glucose Profile:   Hemoglobin A1C   Date Value Ref Range Status   09/13/2018 5.0 0 - 5.6 % Final     Comment:     Normal <5.7% Prediabetes 5.7-6.4%  Diabetes 6.5% or higher - adopted from ADA   consensus guidelines.         Last Lipid Profile:   Cholesterol   Date Value Ref Range Status   04/03/2019 163 <200 mg/dL Final   09/13/2018 173 <200 mg/dL Final     HDL Cholesterol   Date Value Ref Range Status   04/03/2019 39 (L) >49 mg/dL Final   09/13/2018 40 (L) >49 mg/dL Final     LDL Cholesterol Calculated   Date Value Ref Range Status   04/03/2019 112 (H) <100 mg/dL Final     Comment:     Above desirable:  100-129 mg/dl  Borderline High:  130-159 mg/dL  High:             160-189 mg/dL  Very high:       >189 mg/dl     09/13/2018 96 <100 mg/dL Final     Comment:     Desirable:       <100 mg/dl     Triglycerides   Date Value Ref Range Status   04/03/2019 59 <150 mg/dL Final   09/13/2018 184 (H) <150 mg/dL Final     Comment:     Borderline high:  150-199 mg/dl  High:             200-499 mg/dl  Very high:       >499 mg/dl       No results found for: CHOLHDLRATIO    Most recent CBC:  Recent Labs   Lab Test 07/20/19  1400 07/18/19  1446   WBC 7.7 7.8   HGB 13.8 13.7   HCT 38.9 41.4    263     Most recent hepatic panel:  Recent Labs   Lab Test 07/20/19  1400 07/18/19  1446   ALT 27 30   AST 13 25     Most recent creatinine:  Recent Labs   Lab Test 07/20/19  1400 07/18/19  1446   CR 0.75 0.85       Last Thyroid Profile:   TSH   Date Value Ref Range Status   07/18/2019 0.44 0.40 - 4.00 mU/L Final       Last Mineral Profile:   Ferritin   Date Value Ref Range Status   01/09/2017 42 12 - 150 ng/mL Final     Iron   Date Value Ref Range Status   01/09/2017 82 35 - 180 ug/dL Final     Iron  "Binding Cap   Date Value Ref Range Status   01/09/2017 362 240 - 430 ug/dL Final         Last Vitamin Profile:   25 OH Vit D2   Date Value Ref Range Status   02/13/2017 36 ug/L Final     25 OH Vit D3   Date Value Ref Range Status   02/13/2017 13 ug/L Final     25 OH Vit D total   Date Value Ref Range Status   02/13/2017 49 20 - 75 ug/L Final     Comment:     Season, race, dietary intake, and treatment affect the concentration of   25-hydroxy-Vitamin D. Values may decrease during winter months and increase   during summer months. Values 20-29 ug/L may indicate Vitamin D insufficiency   and values <20 ug/L may indicate Vitamin D deficiency.   This test was developed and its performance characteristics determined by the   [facility],  Special Chemistry Laboratory. It has   not been cleared or approved by the FDA. The laboratory is regulated under   CLIA   as qualified to perform high-complexity testing. This test is used for   clinical   purposes. It should not be regarded as investigational or for research.         ANTHROPOMETRICS:  Vitals:   BP Readings from Last 1 Encounters:   08/05/19 122/84     Pulse Readings from Last 1 Encounters:   08/05/19 88     Estimated body mass index is 32.19 kg/m  as calculated from the following:    Height as of 8/5/19: 1.575 m (5' 2\").    Weight as of 8/5/19: 79.8 kg (176 lb).    Wt Readings from Last 5 Encounters:   08/05/19 79.8 kg (176 lb)   07/22/19 79.6 kg (175 lb 6.4 oz)   07/20/19 77.1 kg (170 lb)   07/20/19 81.6 kg (180 lb)   07/18/19 81.8 kg (180 lb 4.8 oz)           NUTRITION DIAGNOSIS:   1. Altered GI function related to high intake of inflammatory foods wheat/dairy and FODMAPs as evidenced by BMI of 32, irregular BMs, digestive issues.     NUTRITION INTERVENTION:  Long Term Goals:  Goal: 1 bowl movement daily [place] Stool Type 4 soft and formed   Goal: Decrease digestive symptoms and acid reflux  Goal: Lose 20lbs in 6 months.       Eat real " anti-inflammatory food. When you eat whole, real foods in their unprocessed forms, you take the first step to healing our gut and overall health. Eat plenty of vegetables, fruits, nuts, seeds, and other plant foods.     Consider eliminating high fodmap foods.  First focus on wheat which breaks down into the sugar fructose and dairy which breaks down into lactose to see if these high fodmap foods are the big issue foods. Then focus on taking out the other foods that may be higher in fodmaps such as fruits or veggies.     Try to eat 3 small meals daily at  7:30-8:30am, 11-noon, 5-6pm and snacks at 3pm or 8pm. See recipes start with smoothies for breakfast       Strive for  grams of high quality fiber a day. Starting with smoothies     2-3 servings of colorful fruits/day     3-4 servings of vegetables/day     Add two tablespoons of ground flaxseed and or sherrill seeds into smoothies or salads provides an easy fiber boost.     Try 1/4 cup of nuts or seeds. If you have a hard time digesting the whole nuts, try nut butters (almond, sunflower seed butter etc are great options)   A great recipe to start with is sherrill seed pudding topped with your favorite nuts and berries or smoothie for breakfast or snack.     Avoid inflammatory foods. Dairy tops the list. The proteins like casein and whey in dairy can irritate and inflame your gut, while gluten the protein in wheat, rye and barley is a close second. Give those foods up for at least three weeks and see if digestion, blood sugars, weight and overall health improve. Try coconut milk instead of cows milk.     Eat high quality healthy fats. Starting with smoothies     Smart, healthy fat sources include wild fatty fish like salmon, herring sardines.     Try using extra virgin olive oil (which lubricates the digestive system), coconut, cold pressed sesame, avocado and other nut oils. 1 tsp to 1 tbsp of MCT oil from coconuts can be added into coffee, smoothies, and salad  "dressings.     Eat protein: Strive for 80g per day - (1oz = 7g). To avoid blood sugar imbalances consume around 21 g or 3oz per meal 3x/day and around 7-8g for snacks 2x per day. Eat clean, lean cuts and sustainably raised animal protein such as fish, turkey, chicken, nuts or nut butters and seeds.     Incorporate protein powder daily.     Plant based hemp (recommended brands: [place], Nutiva, Just Hemp Protein, [employer] Mill)      Plant based pea (recommended brands: Naked Pea, Now Sports). If you want to try a combo of pea and hemp the brand Ramos in vanilla or chocolate is a great option.     Try Bone broth protein powder or collagen peptides in liquid bone broth, vegetable broth or 12 oz of water as snack. The bone broth powder and collagen can be used for soups as well. This can help provide essential amino acids and minerals that heal your gut as well as balance blood sugars. A great option if you have a hard time tolerating solids.     Drink more water. Hydration is critical, so drink at least six to eight glasses of water a day.    Limit sugar from processed junk food. Sodas, juices, and diet drinks impact sugar and fat metabolism.    Review the labels watching out for high carb/high sugar foods that may be low in calories but not high in nutrients from healthy fats/protein - focus on trying to consume 6-15g or less or carbohydrates at snacks 2x per day and 15-30g or less of carbohydrates at meals 3x per day.      Watch out for added sugars on the label 0-<5g of sugar per serving is low      NUTRITION RESOURCES:  Shiv Hirsch Book and Cookbook \"Eat Fat Get Thin\" PDF recipes provided    PATIENT'S BEHAVIOR CHANGE GOALS:   See nutrition intervention for patient stated behavior change goals. AVS was printed and given to patient at today's appointment.    MONITOR / EVALUATE:  Registered Dietitian will monitor/evaluate the following:     Beliefs and attitudes related to food    Food and nutrition knowledge " / skills    Food / Beverage / Nutrient intake     Pertinent Labs    Progress toward meeting stated nutrition-related goals    Readiness to change nutrition-related behaviors    Weight change    Digestion     COORDINATION OF CARE:  Follow up with gastroenterology      FOLLOW-UP:  Follow-up appointment scheduled in 1 month.       Time spent in minutes: 45 minutes 3 units   Encounter: Individual    Bernie Wilson RD, CLT, LD  Integrative Registered Dietitian

## 2019-08-07 NOTE — PATIENT INSTRUCTIONS
NUTRITION INTERVENTION:  Long Term Goals:  Goal: 1 bowl movement daily [place] Stool Type 4 soft and formed   Goal: Decrease digestive symptoms and acid reflux  Goal: Lose 20lbs in 6 months.       Eat real anti-inflammatory food. When you eat whole, real foods in their unprocessed forms, you take the first step to healing our gut and overall health. Eat plenty of vegetables, fruits, nuts, seeds, and other plant foods.     Consider eliminating high fodmap foods.  First focus on wheat which breaks down into the sugar fructose and dairy which breaks down into lactose to see if these high fodmap foods are the big issue foods. Then focus on taking out the other foods that may be higher in fodmaps such as fruits or veggies.     Try to eat 3 small meals daily at  7:30-8:30am, 11-noon, 5-6pm and snacks at 3pm or 8pm. See recipes start with smoothies for breakfast       Strive for  grams of high quality fiber a day. Starting with smoothies     2-3 servings of colorful fruits/day     3-4 servings of vegetables/day     Add two tablespoons of ground flaxseed and or sherrill seeds into smoothies or salads provides an easy fiber boost.     Try 1/4 cup of nuts or seeds. If you have a hard time digesting the whole nuts, try nut butters (almond, sunflower seed butter etc are great options)   A great recipe to start with is sherrill seed pudding topped with your favorite nuts and berries or smoothie for breakfast or snack.     Avoid inflammatory foods. Dairy tops the list. The proteins like casein and whey in dairy can irritate and inflame your gut, while gluten the protein in wheat, rye and barley is a close second. Give those foods up for at least three weeks and see if digestion, blood sugars, weight and overall health improve. Try coconut milk instead of cows milk.     Eat high quality healthy fats. Starting with smoothies     Smart, healthy fat sources include wild fatty fish like salmon, herring sardines.     Try using extra  "virgin olive oil (which lubricates the digestive system), coconut, cold pressed sesame, avocado and other nut oils. 1 tsp to 1 tbsp of MCT oil from coconuts can be added into coffee, smoothies, and salad dressings.     Eat protein: Strive for 80g per day - (1oz = 7g). To avoid blood sugar imbalances consume around 21 g or 3oz per meal 3x/day and around 7-8g for snacks 2x per day. Eat clean, lean cuts and sustainably raised animal protein such as fish, turkey, chicken, nuts or nut butters and seeds.     Incorporate protein powder daily.     Plant based hemp (recommended brands: [place], [employer]-Me, Just Hemp Protein, [employer] Mill)      Plant based pea (recommended brands: Naked Pea, Now Sports). If you want to try a combo of pea and hemp the brand Ramos in vanilla or chocolate is a great option.     Try Bone broth protein powder or collagen peptides in liquid bone broth, vegetable broth or 12 oz of water as snack. The bone broth powder and collagen can be used for soups as well. This can help provide essential amino acids and minerals that heal your gut as well as balance blood sugars. A great option if you have a hard time tolerating solids.     Drink more water. Hydration is critical, so drink at least six to eight glasses of water a day.    Limit sugar from processed junk food. Sodas, juices, and diet drinks impact sugar and fat metabolism.    Review the labels watching out for high carb/high sugar foods that may be low in calories but not high in nutrients from healthy fats/protein - focus on trying to consume 6-15g or less or carbohydrates at snacks 2x per day and 15-30g or less of carbohydrates at meals 3x per day.      Watch out for added sugars on the label 0-<5g of sugar per serving is low      NUTRITION RESOURCES:  Shiv Hirsch Book and Cookbook \"Eat Fat Get Thin\" PDF recipes provided  "

## 2019-08-08 LAB — PAIN DRUG SCR UR W RPTD MEDS: NORMAL

## 2019-08-13 ENCOUNTER — MYC MEDICAL ADVICE (OUTPATIENT)
Dept: FAMILY MEDICINE | Facility: OTHER | Age: 29
End: 2019-08-13

## 2019-08-14 NOTE — PROGRESS NOTES
"Subjective     Katy Islas is a 29 year old female who presents to clinic today for the following health issues:    Healthy Habits:     In general, how would you rate your overall health?  Good    Frequency of exercise:  6-7 days/week    Duration of exercise:  30-45 minutes    Do you usually eat at least 4 servings of fruit and vegetables a day, include whole grains    & fiber and avoid regularly eating high fat or \"junk\" foods?  Yes    Taking medications regularly:  Yes    Medication side effects:  Not applicable    Ability to successfully perform activities of daily living:  No assistance needed    Home Safety:  No safety concerns identified    Hearing Impairment:  No hearing concerns    In the past 6 months, have you been bothered by leaking of urine?  No    In general, how would you rate your overall mental or emotional health?  Good      PHQ-2 Total Score: 1    Additional concerns today:  No     Chronic/Recurring Back Pain Follow Up      Where is your back pain located? (Select all that apply) low back both    How would you describe your back pain?  dull ache and sharp    Where does your back pain spread? nowhere    Since your last clinic visit for back pain, how has your pain changed? rapidly worsening    Does your back pain interfere with your job? Not applicable    Since your last visit, have you tried any new treatment? Yes -  steroid injection    - Allergy to skin tac? Patches won't stay on   - Had injection last thursday 8/15/19     The pain is worse     1 on each side injection, since then worsening      Will call them today   - Called the company, they will send her something to help  - On her last one today   - Physical therapy next week - [facility] in [place] in the [place]       - GI issues      Needs refills on Bentyl      Working on diet, no dairy at all     Nutrition sept 14th     Doesn't know when needs to go back to GI     - Counseling going well, mom sees a big difference     - Itchy " "eyes, needs allergy meds refilled     Hypertension Follow-up      Do you check your blood pressure regularly outside of the clinic? Yes     Are you following a low salt diet? Yes    Are your blood pressures ever more than 140 on the top number (systolic) OR more   than 90 on the bottom number (diastolic), for example 140/90? No      Review of Systems   ROS COMP: 12+ point Constitutional, HEENT, cardiovascular, pulmonary, gi, neurological, psychiatric, and gu systems are negative, except as otherwise noted.      Objective    /84   Pulse 98   Temp 99.2  F (37.3  C) (Temporal)   Resp 16   Ht 1.575 m (5' 2\")   Wt 78.3 kg (172 lb 9.6 oz)   SpO2 97%   BMI 31.57 kg/m    There is no height or weight on file to calculate BMI.  Physical Exam   GENERAL APPEARANCE: healthy, alert and no distress  EYES: Eyes grossly normal to inspection, PERRLA, conjunctivae and sclerae with slight injection and no discharge, EOM intact   RESP: Lungs clear to auscultation - no rales, rhonchi or wheezes    CV: Regular rates and rhythm, normal S1 S2, no S3 or S4, no murmur, click or rub, no peripheral edema and peripheral pulses strong and symmetric bilaterally   MS: No musculoskeletal defects are noted and gait is age appropriate without ataxia   SKIN: No suspicious lesions or rashes, hydration status appears adeuqate with normal skin turgor   BACK: Moderate midline tenderness and bilateral paralumbar tenderness (though does seem out of proportion to exam pressure), slightly decreased ROM   PSYCH: Alert and oriented x3; speech- coherent , normal rate and volume; able to articulate logical thoughts, able to abstract reason, no tangential thoughts, no hallucinations or delusions, mentation appears normal, Mood is euthymic. Affect is appropriate for this mood state and bright. Thought content is free of suicidal ideation, hallucinations, and delusions. Dress is adequate and upkept. Eye contact is good during conversation. "       Diagnostic Test Results:  Labs reviewed in Epic  none         Assessment & Plan       ICD-10-CM    1. Chronic pain syndrome G89.4 fentaNYL (DURAGESIC) 25 mcg/hr 72 hr patch   2. Chronic bilateral low back pain with left-sided sciatica M54.42 fentaNYL (DURAGESIC) 25 mcg/hr 72 hr patch    G89.29    3. Lumbar disc disease with radiculopathy M51.16    4. Cervicalgia M54.2    5. Hypertension goal BP (blood pressure) < 130/80 I10    6. Crohn's disease of colon with other complication (H) K50.118 dicyclomine (BENTYL) 20 MG tablet   7. Seasonal allergic rhinitis due to pollen J30.1 levocetirizine (XYZAL) 5 MG tablet   8. Allergic conjunctivitis, bilateral H10.13 azelastine (OPTIVAR) 0.05 % ophthalmic solution   9. Mild intermittent asthma without complication J45.20    10. Adjustment disorder with mixed anxiety and depressed mood F43.23    11. Mild major depression (H) F32.0    12. Bipolar disorder, current episode mixed, severe, without psychotic features (H) F31.63         1-4. Pain  - [facility] 7/18       [facility] pool therapy recommend       Is now scheduled with [facility] in [place] for pool therapy to start next week       Left L4-5-S1 trans foraminal injection recommended      This was done last Thursday 8/15/19       - As previously, patient with complicated spinal history including L4-5 disectomy (10/19/11), L4-5 fusion (2/23/17), coccyx fracture and removal (complicated by infection - 10/18/17), and SI Joint fusion (10/26/18)   - Records received from [facility] - Dr. 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, improving with conservative therapy including physical therapy, injection (done 4/23/19), and bracing       Thoracic and lumbar myelogram and post myelo CT recently completed 6/17/19, surgeon to follow up after this is complete for finalization of plan    - Patient frustrated with care, second  opinion with [facility] did not go well, was told needed chronic pain management   - Patient is on medical marijuana for mood/PTSD issues (following with counseling and psychiatry as well)   - Icing regularly along with stretches from previous physical therapy   - Also taking Gabapentin and Flexeril   -  reviewed   - CSA - 4/24/19   - Fentanyl patch 25 mg/72 hours, MME 60       Discussed ways to keep it stuck (Skin Tac, Tegaderm), reports only 1 left, due to falling off (should have 4) will give refill early but this is a one time deal, needs to make sure they stay on      Continues to have issues, discussed only other option would be to try liquid Oxycodone as I am not willing to try anything stronger, oxycodone liquid may upset her GI issues       She needs to make this work for her, patient in agreement       Give injection time to work       Continue with physical therapy and Flexeril       Reviewed use and side effects including sedation and addiction       Discussed risks of overdose, declined narcan   - Recheck 1 month      5. HTN   - Recent diagnosis, now controlled      Losartan to 100 mg AND start Metoprolol 100 mg once a day in the morning      Continue without change      Will plan on rechecking BMP at next visit      6. GI issues/Crohn's       Saw nutrition, went dairy free, losing 1-2 lbs per week with better symptom control       Continues on Bentyl, needs refill, this was given      Patient not sure when needs to return to GI specialist, recommend she contact them     8.&9. Allergies   - A little worse lately, would like something for itchy eyes, will give prescription anti-histamine drops, reviewed use and side effects  - Continues on Xyzal, reviewed use and side effects   - Reports asthma stable despite worsening allergies      10-12. Mood   - Finally improving, reports having blood pressure controlled also helps with her anxiety, can feel the difference, mom is reporting improved  mood as well  - Continues with psychiatrist and counseling, is also working with  to get into college (wants to be a )       The patient indicates understanding of these issues and agrees with the plan.    Return in about 1 month (around 9/19/2019).     Due to language barrier, an  was present during the history-taking and subsequent discussion (and for part of the physical exam) with this patient.    A total of 50 minutes was spent with the patient today, with greater than 50% of the visit involving counseling and coordination of care.      Aura Rosario PA-C  [facility]

## 2019-08-15 ENCOUNTER — TRANSFERRED RECORDS (OUTPATIENT)
Dept: HEALTH INFORMATION MANAGEMENT | Facility: CLINIC | Age: 29
End: 2019-08-15

## 2019-08-17 ENCOUNTER — MYC MEDICAL ADVICE (OUTPATIENT)
Dept: FAMILY MEDICINE | Facility: OTHER | Age: 29
End: 2019-08-17

## 2019-08-19 ENCOUNTER — OFFICE VISIT (OUTPATIENT)
Dept: FAMILY MEDICINE | Facility: OTHER | Age: 29
End: 2019-08-19
Payer: MEDICARE

## 2019-08-19 VITALS
WEIGHT: 172.6 LBS | RESPIRATION RATE: 16 BRPM | HEIGHT: 62 IN | DIASTOLIC BLOOD PRESSURE: 84 MMHG | HEART RATE: 98 BPM | SYSTOLIC BLOOD PRESSURE: 118 MMHG | OXYGEN SATURATION: 97 % | TEMPERATURE: 99.2 F | BODY MASS INDEX: 31.76 KG/M2

## 2019-08-19 DIAGNOSIS — G89.29 CHRONIC BILATERAL LOW BACK PAIN WITH LEFT-SIDED SCIATICA: ICD-10-CM

## 2019-08-19 DIAGNOSIS — H10.13 ALLERGIC CONJUNCTIVITIS, BILATERAL: ICD-10-CM

## 2019-08-19 DIAGNOSIS — G89.4 CHRONIC PAIN SYNDROME: Primary | ICD-10-CM

## 2019-08-19 DIAGNOSIS — M54.2 CERVICALGIA: ICD-10-CM

## 2019-08-19 DIAGNOSIS — K50.118 CROHN'S DISEASE OF COLON WITH OTHER COMPLICATION (H): ICD-10-CM

## 2019-08-19 DIAGNOSIS — J30.1 SEASONAL ALLERGIC RHINITIS DUE TO POLLEN: ICD-10-CM

## 2019-08-19 DIAGNOSIS — F32.0 MILD MAJOR DEPRESSION (H): ICD-10-CM

## 2019-08-19 DIAGNOSIS — M51.16 LUMBAR DISC DISEASE WITH RADICULOPATHY: ICD-10-CM

## 2019-08-19 DIAGNOSIS — J45.20 MILD INTERMITTENT ASTHMA WITHOUT COMPLICATION: ICD-10-CM

## 2019-08-19 DIAGNOSIS — F31.63 BIPOLAR DISORDER, CURRENT EPISODE MIXED, SEVERE, WITHOUT PSYCHOTIC FEATURES (H): ICD-10-CM

## 2019-08-19 DIAGNOSIS — M54.42 CHRONIC BILATERAL LOW BACK PAIN WITH LEFT-SIDED SCIATICA: ICD-10-CM

## 2019-08-19 DIAGNOSIS — I10 HYPERTENSION GOAL BP (BLOOD PRESSURE) < 130/80: ICD-10-CM

## 2019-08-19 DIAGNOSIS — F43.23 ADJUSTMENT DISORDER WITH MIXED ANXIETY AND DEPRESSED MOOD: ICD-10-CM

## 2019-08-19 PROCEDURE — 99215 OFFICE O/P EST HI 40 MIN: CPT | Performed by: PHYSICIAN ASSISTANT

## 2019-08-19 PROCEDURE — T1013 SIGN LANG/ORAL INTERPRETER: HCPCS | Mod: U3

## 2019-08-19 RX ORDER — FENTANYL 25 UG/1
1 PATCH TRANSDERMAL
Qty: 10 PATCH | Refills: 0 | Status: SHIPPED | OUTPATIENT
Start: 2019-08-21 | End: 2019-09-04

## 2019-08-19 RX ORDER — DICYCLOMINE HCL 20 MG
20 TABLET ORAL 4 TIMES DAILY PRN
Qty: 90 TABLET | Refills: 3 | Status: SHIPPED | OUTPATIENT
Start: 2019-08-19 | End: 2019-11-19

## 2019-08-19 RX ORDER — AZELASTINE HYDROCHLORIDE 0.5 MG/ML
1 SOLUTION/ DROPS OPHTHALMIC 2 TIMES DAILY
Qty: 6 ML | Refills: 11 | Status: SHIPPED | OUTPATIENT
Start: 2019-08-19 | End: 2019-09-11

## 2019-08-19 RX ORDER — LEVOCETIRIZINE DIHYDROCHLORIDE 5 MG/1
5 TABLET, FILM COATED ORAL EVERY EVENING
Qty: 90 TABLET | Refills: 3 | Status: SHIPPED | OUTPATIENT
Start: 2019-08-19 | End: 2019-09-11

## 2019-08-19 RX ORDER — METHOCARBAMOL 750 MG/1
TABLET, FILM COATED ORAL
Refills: 1 | COMMUNITY
Start: 2019-08-06 | End: 2019-09-18

## 2019-08-19 ASSESSMENT — ENCOUNTER SYMPTOMS
HEMATOCHEZIA: 1
WEAKNESS: 0
PARESTHESIAS: 0
NERVOUS/ANXIOUS: 0
CHILLS: 0
MYALGIAS: 1
PALPITATIONS: 0
NAUSEA: 1
SORE THROAT: 0
EYE PAIN: 0
HEADACHES: 1
ABDOMINAL PAIN: 0
HEMATURIA: 0
ARTHRALGIAS: 1
DIARRHEA: 0
HEARTBURN: 1
COUGH: 0
FEVER: 0
CONSTIPATION: 0
DYSURIA: 0
BREAST MASS: 0
DIZZINESS: 0
FREQUENCY: 0
SHORTNESS OF BREATH: 0
JOINT SWELLING: 0

## 2019-08-19 ASSESSMENT — MIFFLIN-ST. JEOR: SCORE: 1456.16

## 2019-08-19 ASSESSMENT — ANXIETY QUESTIONNAIRES
1. FEELING NERVOUS, ANXIOUS, OR ON EDGE: NOT AT ALL
4. TROUBLE RELAXING: NOT AT ALL
7. FEELING AFRAID AS IF SOMETHING AWFUL MIGHT HAPPEN: NOT AT ALL
GAD7 TOTAL SCORE: 0
7. FEELING AFRAID AS IF SOMETHING AWFUL MIGHT HAPPEN: NOT AT ALL
GAD7 TOTAL SCORE: 0
2. NOT BEING ABLE TO STOP OR CONTROL WORRYING: NOT AT ALL
6. BECOMING EASILY ANNOYED OR IRRITABLE: NOT AT ALL
3. WORRYING TOO MUCH ABOUT DIFFERENT THINGS: NOT AT ALL
5. BEING SO RESTLESS THAT IT IS HARD TO SIT STILL: NOT AT ALL
GAD7 TOTAL SCORE: 0

## 2019-08-19 ASSESSMENT — PAIN SCALES - GENERAL: PAINLEVEL: MODERATE PAIN (5)

## 2019-08-19 ASSESSMENT — PATIENT HEALTH QUESTIONNAIRE - PHQ9
10. IF YOU CHECKED OFF ANY PROBLEMS, HOW DIFFICULT HAVE THESE PROBLEMS MADE IT FOR YOU TO DO YOUR WORK, TAKE CARE OF THINGS AT HOME, OR GET ALONG WITH OTHER PEOPLE: NOT DIFFICULT AT ALL
SUM OF ALL RESPONSES TO PHQ QUESTIONS 1-9: 2
SUM OF ALL RESPONSES TO PHQ QUESTIONS 1-9: 2

## 2019-08-19 ASSESSMENT — ACTIVITIES OF DAILY LIVING (ADL): CURRENT_FUNCTION: NO ASSISTANCE NEEDED

## 2019-08-19 NOTE — TELEPHONE ENCOUNTER
Next 5 appointments (look out 90 days)    Aug 19, 2019 10:00 AM CDT  Office Visit with Aura Rosario PA-C, Dayana Roldan  [facility] ([facility]) [address]  [facility] [address]  [phone]   Sep 04, 2019 11:00 AM CDT  Return Visit with Bernie Wilson RD  [facility] ([facility]) [address] [place] [id number]  [phone]        Ines Roy, RN, BSN

## 2019-08-20 ENCOUNTER — HOSPITAL ENCOUNTER (EMERGENCY)
Facility: CLINIC | Age: 29
Discharge: HOME | End: 2019-08-21
Attending: PHYSICIAN ASSISTANT | Admitting: PHYSICIAN ASSISTANT
Payer: MEDICARE

## 2019-08-20 ENCOUNTER — MYC MEDICAL ADVICE (OUTPATIENT)
Dept: FAMILY MEDICINE | Facility: OTHER | Age: 29
End: 2019-08-20

## 2019-08-20 DIAGNOSIS — M54.50 LOW BACK PAIN: ICD-10-CM

## 2019-08-20 DIAGNOSIS — R11.0 NAUSEA: ICD-10-CM

## 2019-08-20 PROCEDURE — 99282 EMERGENCY DEPT VISIT SF MDM: CPT | Mod: Z6 | Performed by: PHYSICIAN ASSISTANT

## 2019-08-20 PROCEDURE — 36415 COLL VENOUS BLD VENIPUNCTURE: CPT

## 2019-08-20 PROCEDURE — 81001 URINALYSIS AUTO W/SCOPE: CPT | Performed by: PHYSICIAN ASSISTANT

## 2019-08-20 PROCEDURE — 99283 EMERGENCY DEPT VISIT LOW MDM: CPT | Performed by: PHYSICIAN ASSISTANT

## 2019-08-20 ASSESSMENT — ASTHMA QUESTIONNAIRES: ACT_TOTALSCORE: 25

## 2019-08-20 ASSESSMENT — PATIENT HEALTH QUESTIONNAIRE - PHQ9: SUM OF ALL RESPONSES TO PHQ QUESTIONS 1-9: 2

## 2019-08-20 ASSESSMENT — MIFFLIN-ST. JEOR: SCORE: 1444.36

## 2019-08-20 ASSESSMENT — ANXIETY QUESTIONNAIRES: GAD7 TOTAL SCORE: 0

## 2019-08-20 NOTE — ED AVS SNAPSHOT
[facility] Emergency Department  [address] [place] [address]  Phone:  [phone]  Fax:  [phone]                                    Katy Islas   MRN: [id number]    Department:  [facility] Emergency Department   Date of Visit:  8/20/2019           After Visit Summary Signature Page    I have received my discharge instructions, and my questions have been answered. I have discussed any challenges I see with this plan with the nurse or doctor.    ..........................................................................................................................................  Patient/Patient Representative Signature      ..........................................................................................................................................  Patient Representative Print Name and Relationship to Patient    ..................................................               ................................................  Date                                   Time    ..........................................................................................................................................  Reviewed by Signature/Title    ...................................................              ..............................................  Date                                               Time          22EPIC Rev 08/18

## 2019-08-21 ENCOUNTER — PATIENT OUTREACH (OUTPATIENT)
Dept: CARE COORDINATION | Facility: CLINIC | Age: 29
End: 2019-08-21

## 2019-08-21 VITALS
HEIGHT: 62 IN | WEIGHT: 170 LBS | OXYGEN SATURATION: 98 % | TEMPERATURE: 99.3 F | RESPIRATION RATE: 20 BRPM | SYSTOLIC BLOOD PRESSURE: 134 MMHG | BODY MASS INDEX: 31.28 KG/M2 | DIASTOLIC BLOOD PRESSURE: 91 MMHG

## 2019-08-21 LAB
ALBUMIN UR-MCNC: NEGATIVE MG/DL
ANION GAP SERPL CALCULATED.3IONS-SCNC: 6 MMOL/L (ref 3–14)
APPEARANCE UR: ABNORMAL
BACTERIA #/AREA URNS HPF: ABNORMAL /HPF
BASOPHILS # BLD AUTO: 0 10E9/L (ref 0–0.2)
BASOPHILS NFR BLD AUTO: 0.3 %
BILIRUB UR QL STRIP: ABNORMAL
BUN SERPL-MCNC: 18 MG/DL (ref 7–30)
CALCIUM SERPL-MCNC: 9.1 MG/DL (ref 8.5–10.1)
CHLORIDE SERPL-SCNC: 107 MMOL/L (ref 94–109)
CO2 SERPL-SCNC: 27 MMOL/L (ref 20–32)
COLOR UR AUTO: YELLOW
CREAT SERPL-MCNC: 0.73 MG/DL (ref 0.52–1.04)
CRP SERPL-MCNC: <2.9 MG/L (ref 0–8)
DIFFERENTIAL METHOD BLD: NORMAL
EOSINOPHIL NFR BLD AUTO: 0.7 %
ERYTHROCYTE [DISTWIDTH] IN BLOOD BY AUTOMATED COUNT: 12.2 % (ref 10–15)
GFR SERPL CREATININE-BSD FRML MDRD: >90 ML/MIN/{1.73_M2}
GLUCOSE SERPL-MCNC: 87 MG/DL (ref 70–99)
GLUCOSE UR STRIP-MCNC: NEGATIVE MG/DL
HCT VFR BLD AUTO: 41.9 % (ref 35–47)
HGB BLD-MCNC: 15 G/DL (ref 11.7–15.7)
HGB UR QL STRIP: NEGATIVE
IMM GRANULOCYTES # BLD: 0 10E9/L (ref 0–0.4)
IMM GRANULOCYTES NFR BLD: 0.7 %
KETONES UR STRIP-MCNC: NEGATIVE MG/DL
LEUKOCYTE ESTERASE UR QL STRIP: NEGATIVE
LYMPHOCYTES # BLD AUTO: 2.3 10E9/L (ref 0.8–5.3)
LYMPHOCYTES NFR BLD AUTO: 38.3 %
MCH RBC QN AUTO: 33 PG (ref 26.5–33)
MCHC RBC AUTO-ENTMCNC: 35.8 G/DL (ref 31.5–36.5)
MCV RBC AUTO: 92 FL (ref 78–100)
MONOCYTES # BLD AUTO: 0.7 10E9/L (ref 0–1.3)
MONOCYTES NFR BLD AUTO: 11.4 %
MUCOUS THREADS #/AREA URNS LPF: PRESENT /LPF
NEUTROPHILS # BLD AUTO: 2.9 10E9/L (ref 1.6–8.3)
NEUTROPHILS NFR BLD AUTO: 48.6 %
NITRATE UR QL: NEGATIVE
NRBC # BLD AUTO: 0 10*3/UL
NRBC BLD AUTO-RTO: 0 /100
PH UR STRIP: 6 PH (ref 5–7)
PLATELET # BLD AUTO: 283 10E9/L (ref 150–450)
POTASSIUM SERPL-SCNC: 3.7 MMOL/L (ref 3.4–5.3)
RBC # BLD AUTO: 4.54 10E12/L (ref 3.8–5.2)
RBC #/AREA URNS AUTO: <1 /HPF (ref 0–2)
SODIUM SERPL-SCNC: 140 MMOL/L (ref 133–144)
SOURCE: ABNORMAL
SP GR UR STRIP: 1.02 (ref 1–1.03)
SQUAMOUS #/AREA URNS AUTO: 6 /HPF (ref 0–1)
UROBILINOGEN UR STRIP-MCNC: 0 MG/DL (ref 0–2)
WBC # BLD AUTO: 5.9 10E9/L (ref 4–11)
WBC #/AREA URNS AUTO: 4 /HPF (ref 0–5)

## 2019-08-21 PROCEDURE — 85025 COMPLETE CBC W/AUTO DIFF WBC: CPT | Performed by: PHYSICIAN ASSISTANT

## 2019-08-21 PROCEDURE — 86140 C-REACTIVE PROTEIN: CPT | Performed by: PHYSICIAN ASSISTANT

## 2019-08-21 PROCEDURE — 80048 BASIC METABOLIC PNL TOTAL CA: CPT | Performed by: PHYSICIAN ASSISTANT

## 2019-08-21 RX ORDER — ONDANSETRON 4 MG/1
4 TABLET, ORALLY DISINTEGRATING ORAL ONCE
Status: DISCONTINUED | OUTPATIENT
Start: 2019-08-21 | End: 2019-08-21 | Stop reason: HOSPADM

## 2019-08-21 ASSESSMENT — ACTIVITIES OF DAILY LIVING (ADL): DEPENDENT_IADLS:: INDEPENDENT

## 2019-08-21 NOTE — ED NOTES
"Patient is requesting discharge, \"I just want to go home and go to bed. Can't you call me with the results.\" Marian DIAZ updated.   "

## 2019-08-21 NOTE — ED PROVIDER NOTES
"  History     Chief Complaint   Patient presents with     Back Pain     HPI  Katy Islas is a 29 year old female who presents to the emergency department complaining of low back pain. The patient reports for the last couple days she has had increased lower back pain as well as lower abdominal pain.  She has had increased urinary frequency but no burning with urination.  She complains of feeling nauseated and has vomited twice today.  She notes that she had a fever earlier today of 101 F but this resolved with Tylenol and ibuprofen.  She last took Tylenol ibuprofen 2 hours prior to arrival.  She states she has chronic back pain which is usually managed with fentanyl patches but this feels worse than usual, like a \"kidney infection.\"  She denies any trauma to the back.  Known new numbness/weakness, or pain in her legs.  She also tried taking Robaxin at home for pain without relief.  Notes mild congestion but no significant shortness of breath or severe cough. She notes she had an injection in her back last week for her chronic pain.    Allergies:  Allergies   Allergen Reactions     Iohexol Hives     Other reaction(s): Hives  IV contrast; patient states she tolerates contrast if she gets benadryl before  IV contrast; patient states she tolerates contrast if she gets benadryl before     Ativan [Lorazepam] Hives     At the IV site     Baclofen      hives     Bees Hives, Swelling and Difficulty breathing     Caffeine      Contrast Dye      Hives,   Updated 5/10/2016 CT Contrast.     Iodine Hives     Methocarbamol Swelling     Metoclopramide      Other reaction(s): Tremors  LW Reaction: shaking/sweating     Midazolam      Other reaction(s): Agitation     Monosodium Glutamate      Morphine Other (See Comments)     Difficulty with urination     Nsaids Other (See Comments)     GI BLEED x2     Other (Do Not Use) Other (See Comments)     Xanaflex- pt becomes disoriented and loses bladder control     Reglan [Metoclopramide " Hcl] Other (See Comments)     shaking     Soma [Carisoprodol] Visual Disturbance     Sleep walking     Tizanidine      Topamax Other (See Comments)     Topamax [Topiramate] Nausea     Tingling  GI/Vomit     Tramadol      Severe Headache, Seizure Risk     Tylenol W/Codeine [Acetaminophen-Codeine] Nausea and Itching     Tylenol 3     Valium Other (See Comments)     Becomes aggressive and angry     Versed Other (See Comments)     Zolmitriptan      Makes face feel like its twitching     Droperidol Anxiety     Flu Virus Vaccine Rash      Arm swelling       Problem List:    Patient Active Problem List    Diagnosis Date Noted     Hypertension goal BP (blood pressure) < 130/80 08/05/2019     Priority: Medium     Bulge of cervical disc without myelopathy 04/24/2019     Priority: Medium     Cervicalgia 04/17/2019     Priority: Medium     Class 1 obesity due to excess calories without serious comorbidity with body mass index (BMI) of 32.0 to 32.9 in adult 04/03/2019     Priority: Medium     Hypophosphatemia 11/01/2018     Priority: Medium     Patellar maltracking, right 09/05/2018     Priority: Medium     Right anterior knee pain 09/05/2018     Priority: Medium     Melanocytic nevus, unspecified location 07/23/2018     Priority: Medium     Dysplastic skin lesion 07/23/2018     Priority: Medium     Iliotibial band syndrome affecting lower leg, right 12/21/2017     Priority: Medium     Chondromalacia of right patellofemoral joint 12/21/2017     Priority: Medium     Upper GI bleed - suspected 07/01/2017     Priority: Medium     Tobacco use disorder 07/01/2017     Priority: Medium     History of pseudoseizure 07/01/2017     Priority: Medium     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) 03/10/2017     Priority: Medium     Lumbar disc disease with radiculopathy 02/23/2017     Priority: Medium     S/P lumbar fusion 02/23/2017     Priority: Medium     Dr. YOVANI Millan, 2/23/17       Vitamin D deficiency 01/06/2017     Priority:  Medium     Atypical mole 01/06/2017     Priority: Medium     Mild persistent asthma without complication 12/02/2016     Priority: Medium     Chronic bilateral low back pain with left-sided sciatica 12/02/2016     Priority: Medium     Bee sting allergy 09/16/2016     Priority: Medium     Gastroesophageal reflux disease without esophagitis 08/26/2016     Priority: Medium     Herpes simplex virus infection 11/12/2015     Priority: Medium     Adjustment disorder with mixed anxiety and depressed mood 10/14/2015     Priority: Medium     Marijuana abuse 02/22/2015     Priority: Medium     Bipolar disorder (H) 01/31/2013     Priority: Medium     Problem list name updated by automated process. Provider to review       Chronic pain 02/09/2012     Priority: Medium     Patient is followed by Aura Rosario PA-C for ongoing prescription of pain medication.  All refills should only be approved by this provider, or covering partner.    Medication(s): Norco    Maximum quantity per month: 70  Clinic visit frequency required: Q 2 months     Controlled substance agreement:   Discussed and signed 4/25/17    Encounter-Level CSA - 01/12/2015:                 Controlled Substance Agreement - Scan on 1/26/2015  9:14 AM : Controlled Medication Agreement-01/12/15 (below)            Pain Clinic evaluation in the past: Yes       Date/Location:   [facility], fall 2016    DIRE Total Score(s):    4/25/2017   Total Score 17       Last [facility] website verification:  done on 4/25/17 by LOVE Maki   [email or web address]           Anxiety 09/22/2011     Priority: Medium     Mild major depression (H) 08/29/2011     Priority: Medium     Mild intermittent asthma 10/12/2009     Priority: Medium     Overview:   Formatting of this note might be different from the original.  Action plan: See letter 4/10/2013   ATAQ:   Asthma Data 4/10/2013   Date completed 4/10/2013   Missed daily activities no = 0   Wake at night no = 0    Believe asthma in control yes = 0   ARIN use yes   Maximum ARIN use in 1 day 1-4 = 0   ATAQ score 0 = well controlled   Asthma ED visits in past 12 mos 0   Asthma hospitalizations in past 12 mos 0     Current Outpatient Prescriptions   Medication Sig     albuterol (PROVENTIL) 0.083 % neb solution Inhale 3 mL via a nebulizer every 4 hours if needed for Shortness Of Breath.        Crohn's colitis (H) 08/04/2009     Priority: Medium     Dysmenorrhea 05/11/2007     Priority: Medium     Benign neoplasm of skin of lower limb, including hip 03/16/2004     Priority: Medium     Migraine      Priority: Medium     Dr. Farrar - Neurology.  Now on Inderal.  Seems to be working.  Follow-up 9/08  Problem list name updated by automated process. Provider to review       Hearing loss      Priority: Medium     Congenital  Problem list name updated by automated process. Provider to review       Allergic rhinitis      Priority: Medium     Problem list name updated by automated process. Provider to review          Past Medical History:    Past Medical History:   Diagnosis Date     Abdominal pain 10/31- 11/4/2005     Allergic rhinitis, cause unspecified      Arthritis      C. 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     COLONOSCOPY N/A 7/17/2019    Procedure: Colonoscopy, With Polypectomy And Biopsy;  Surgeon: Edwin Coned MD;  Location: [facility] OR     COLONOSCOPY WITH CO2 INSUFFLATION N/A 7/17/2019    Procedure: COLONOSCOPY, WITH CO2 INSUFFLATION;  Surgeon: Edwin Conde MD;  Location: [facility] OR     COMBINED ESOPHAGOSCOPY, GASTROSCOPY, DUODENOSCOPY (EGD) WITH CO2 INSUFFLATION N/A 4/12/2019    Procedure: COMBINED ESOPHAGOSCOPY, GASTROSCOPY, DUODENOSCOPY (EGD) WITH CO2 INSUFFLATION;  Surgeon: Edwin Conde MD;  Location: [facility] OR     ESOPHAGOSCOPY, GASTROSCOPY, DUODENOSCOPY (EGD), COMBINED N/A 4/12/2019    Procedure: Combined Esophagoscopy, Gastroscopy, Duodenoscopy (Egd), Biopsy Single Or Multiple;  Surgeon: Edwin Conde MD;  Location: [facility] OR     FUSION SPINE POSTERIOR MINIMALLY INVASIVE ONE LEVEL N/A 2/23/2017    Procedure: FUSION SPINE POSTERIOR MINIMALLY INVASIVE ONE LEVEL;  L4-5 Oblique Lateral Lumbar Interbody Fusion   Epidural steroid injection.   Transpedicular Bone marrow aspiration;  Surgeon: Jeniffer Eugene MD;  Location: [facility] OR     HC COLONOSCOPY THRU STOMA WITH BIOPSY  10/29/2003    Impression is that of normal appearing colonoscopy, without evidence of rectal bleeding.     HC COLONOSCOPY THRU STOMA, DIAGNOSTIC  10/00    normal     HC COLONOSCOPY THRU STOMA, DIAGNOSTIC  Oct 2009    Dr López- normal     HC EEG AWAKE AND SLEEP      abnormal     HC MRI BRAIN W/O CONTRAST  12/00    normal     HC REMOVAL GALLBLADDER  8/5/2009    [facility], [place]     HC UGI ENDOSCOPY DIAG W BIOPSY  11/11/09    Normal esophagus     HC UGI ENDOSCOPY, SIMPLE EXAM  7/00, 10/00    mild chronic esophagitis and duodenitis, neg H pylori     HC UGI ENDOSCOPY, SIMPLE EXAM  01/20/2005    Esophagogastroduodenoscopy,  colonoscopy with biopsies.  [facility] UGI ENDOSCOPY, SIMPLE EXAM  7/1/2009    [facility], [place]      UGI ENDOSCOPY, SIMPLE EXAM  11/11/2009    attempted upper GI, pt. could not tolerate procedure:[facility]     ORTHOPEDIC SURGERY  October 19,2011    diskectomy L4-L5       Family History:    Family History   Problem Relation Age of Onset     Gastrointestinal Disease Brother         severe Crohn's     Neurologic Disorder Brother         Seizures post head injury     Depression Brother      Substance Abuse Brother      Genitourinary Problems Father         kidney stones     Diabetes Father      Heart Disease Father         Open heart surgery     Breast Cancer Maternal Grandmother      Parkinsonism Maternal Grandmother      Cerebrovascular Disease Paternal Grandmother      Cancer Maternal Grandfather         Lung     Cardiovascular Paternal Grandfather         Heart Attack     Substance Abuse Mother         in recovery x1 year      Lung Cancer Paternal Uncle      Cancer Paternal Uncle      Lung Cancer Maternal Uncle        Social History:  Marital Status:  Single [1]  Social History     Tobacco Use     Smoking status: Current Every Day Smoker     Packs/day: 0.50     Years: 5.00     Pack years: 2.50     Types: Cigarettes     Smokeless tobacco: Never Used   Substance Use Topics     Alcohol use: Not Currently     Comment: Not since last July     Drug use: Yes     Types: Marijuana     Comment: Medical Marijuana currently-- More CBD        Medications:      acetaminophen (TYLENOL) 500 MG tablet   cyclobenzaprine (FLEXERIL) 10 MG tablet   fentaNYL (DURAGESIC) 25 mcg/hr 72 hr patch   ibuprofen (ADVIL/MOTRIN) 100 MG tablet   albuterol (2.5 MG/3ML) 0.083% neb solution   albuterol (PROAIR HFA/PROVENTIL HFA/VENTOLIN HFA) 108 (90 Base) MCG/ACT Inhaler   ARIPiprazole (ABILIFY) 15 MG tablet   aspirin (ASA) 81 MG tablet   azelastine (OPTIVAR) 0.05 % ophthalmic solution   busPIRone  "(BUSPAR) 15 MG tablet   clonazePAM (KLONOPIN) 0.5 MG tablet   dicyclomine (BENTYL) 20 MG tablet   diphenhydrAMINE (BENADRYL) 50 MG capsule   DULoxetine (CYMBALTA) 60 MG EC capsule   EPINEPHrine (EPIPEN/ADRENACLICK/OR ANY BX GENERIC EQUIV) 0.3 MG/0.3ML injection 2-pack   gabapentin (NEURONTIN) 300 MG capsule   levocetirizine (XYZAL) 5 MG tablet   losartan (COZAAR) 100 MG tablet   medical cannabis (Patient's own supply)   medroxyPROGESTERone (DEPO-PROVERA) 150 MG/ML IM injection   methocarbamol (ROBAXIN) 750 MG tablet   metoprolol succinate ER (TOPROL-XL) 100 MG 24 hr tablet   mupirocin (BACTROBAN) 2 % external ointment   nicotine (NICODERM CQ) 14 MG/24HR 24 hr patch   NONFORMULARY   nystatin (MYCOSTATIN) cream   pantoprazole (PROTONIX) 40 MG EC tablet   prazosin (MINIPRESS) 1 MG capsule   prochlorperazine (COMPAZINE) 10 MG tablet   prochlorperazine (COMPAZINE) 25 MG suppository   rizatriptan (MAXALT-MLT) 5 MG ODT tab   sucralfate (CARAFATE) 1 GM tablet         Review of Systems   All other systems reviewed and are negative.      Physical Exam   BP: (!) 134/91  Heart Rate: 92  Temp: 99.3  F (37.4  C)  Resp: 20  Height: 157.5 cm (5' 2\")  Weight: 77.1 kg (170 lb)  SpO2: 98 %      Physical Exam   Constitutional: She is oriented to person, place, and time. She appears well-developed and well-nourished. No distress.   Patient is deaf,  in room assisting with interview   HENT:   Head: Normocephalic and atraumatic.   Neck: Neck supple.   Cardiovascular: Normal rate, regular rhythm and normal heart sounds.   Pulmonary/Chest: Effort normal and breath sounds normal. No respiratory distress.   Abdominal: Soft. She exhibits no distension and no mass. There is no tenderness. There is no guarding.   No CVA tenderness bilaterally   Musculoskeletal:        Cervical back: Normal.        Thoracic back: Normal.        Lumbar back: She exhibits tenderness (bilateral outer musculature of low back.). She exhibits no bony " tenderness.   Central lumbar back without rash, tenderness, redness, or erythema. No underlying fluctuance.   Neurological: She is alert and oriented to person, place, and time.   Skin: Skin is warm and dry. She is not diaphoretic.   Psychiatric: She has a normal mood and affect.   Nursing note and vitals reviewed.      ED Course        Procedures      Results for orders placed or performed during the hospital encounter of 08/20/19 (from the past 24 hour(s))   Routine UA with microscopic   Result Value Ref Range    Color Urine Yellow     Appearance Urine Slightly Cloudy     Glucose Urine Negative NEG^Negative mg/dL    Bilirubin Urine Small (A) NEG^Negative    Ketones Urine Negative NEG^Negative mg/dL    Specific Gravity Urine 1.021 1.003 - 1.035    Blood Urine Negative NEG^Negative    pH Urine 6.0 5.0 - 7.0 pH    Protein Albumin Urine Negative NEG^Negative mg/dL    Urobilinogen mg/dL 0.0 0.0 - 2.0 mg/dL    Nitrite Urine Negative NEG^Negative    Leukocyte Esterase Urine Negative NEG^Negative    Source Midstream Urine     WBC Urine 4 0 - 5 /HPF    RBC Urine <1 0 - 2 /HPF    Bacteria Urine Few (A) NEG^Negative /HPF    Squamous Epithelial /HPF Urine 6 (H) 0 - 1 /HPF    Mucous Urine Present (A) NEG^Negative /LPF   CBC with platelets differential   Result Value Ref Range    WBC 5.9 4.0 - 11.0 10e9/L    RBC Count 4.54 3.8 - 5.2 10e12/L    Hemoglobin 15.0 11.7 - 15.7 g/dL    Hematocrit 41.9 35.0 - 47.0 %    MCV 92 78 - 100 fl    MCH 33.0 26.5 - 33.0 pg    MCHC 35.8 31.5 - 36.5 g/dL    RDW 12.2 10.0 - 15.0 %    Platelet Count 283 150 - 450 10e9/L    Diff Method Automated Method     % Neutrophils 48.6 %    % Lymphocytes 38.3 %    % Monocytes 11.4 %    % Eosinophils 0.7 %    % Basophils 0.3 %    % Immature Granulocytes 0.7 %    Nucleated RBCs 0 0 /100    Absolute Neutrophil 2.9 1.6 - 8.3 10e9/L    Absolute Lymphocytes 2.3 0.8 - 5.3 10e9/L    Absolute Monocytes 0.7 0.0 - 1.3 10e9/L    Absolute Basophils 0.0 0.0 - 0.2 10e9/L     Abs Immature Granulocytes 0.0 0 - 0.4 10e9/L    Absolute Nucleated RBC 0.0      *Note: Due to a large number of results and/or encounters for the requested time period, some results have not been displayed. A complete set of results can be found in Results Review.       Medications   ondansetron (ZOFRAN-ODT) ODT tab 4 mg (4 mg Oral Not Given 8/21/19 0017)       Assessments & Plan (with Medical Decision Making)  Katy Islas is a 29 year old female who presented to the ED complaining of increased low back pain, nausea with 2 episodes of vomiting, and fever at home for the last couple days.  On arrival to the ED however she was afebrile with unremarkable vital signs.  Did report taking Tylenol/ibuprofen prior to arrival.  On exam today she had mild tenderness to the lateral musculature of the low back but no CVA tenderness, no abdominal tenderness, no midline tenderness.  Recent lumbar injection last week, considered potential infection or other complication from this however she had no new neurological symptoms and no central back pain, no overlying skin changes to injection site or tenderness to injection site to suggest this.  Question urinary tract infection causing symptoms however UA today was overall benign.  Additional labs reviewed, white count normal at 5.9, negative CRP, unremarkable BMP.  Discussed results with the patient and she was overall reassured.  She had requested narcotic medication for pain control while here but she was told she needs a  present before administration which she did not provide so no medication was given here.  I suspect her back pain is muscular in nature.  Question if she has underlying viral syndrome contributing to her documented fever at home.  I do not see evidence of acute bacterial infection to suggest need for antibiotics.  Patient felt comfortable monitoring things at home and treating symptomatically as she has been over the last couple days with  Tylenol/ibuprofen, muscle relaxers.  Encouraged ice or heat to low back and stretching.  If no improvement in a couple days she should follow-up in the clinic.  For any worsening concerns she can return to the ED.  All questions answered and patient discharged home in suitable condition.     I have reviewed the nursing notes.    I have reviewed the findings, diagnosis, plan and need for follow up with the patient.    New Prescriptions    No medications on file       Final diagnoses:   Low back pain   Nausea     Note: Chart documentation done in part with Dragon Voice Recognition software. Although reviewed after completion, some word and grammatical errors may remain.      8/20/2019   [facility] EMERGENCY DEPARTMENT     Marian Barton PA-C  08/21/19 0046

## 2019-08-21 NOTE — DISCHARGE INSTRUCTIONS
Continue with your home medications as discussed.  Apply heat or ice to the low back.  Follow-up in the clinic in a couple days if no improvement.  Return to the emergency department for worsening concerns.    Thank you for choosing [facility]'s Emergency Department. It was a pleasure taking care of you today. If you have any questions, please call [phone].    Marian Barton PA-C

## 2019-08-21 NOTE — PROGRESS NOTES
"Clinic Care Coordination Contact  [facility]       Clinical Data: Care Coordinator Outreach  Outreach attempted x 1.  \"we're sorry the person you are trying to call is unavailable.\" unable to leave a VM message  Plan: Care Coordinator mailed out care coordination introduction letter on 5/8/19. Care Coordinator will try to reach patient again in 1-2 business days.    RADHA Otto RN Clinic Care Coordinator   [place], [place], [place]  Phone: [phone]     "

## 2019-08-22 NOTE — PROGRESS NOTES
"Clinic Care Coordination Contact  [facility]/[employer]    Referral Source: ED Follow-Up  Clinical Data: Care Coordinator Outreach    Outreach attempted x 2.  \"we're sorry the person you are trying to call is unavailable.\" unable to leave a VM message    Plan: Care Coordinator mailed out care coordination introduction letter on 5/8/19.  Care Coordinator will do no further outreaches at this time.    RADHA Otto RN Clinic Care Coordinator   [place], [place], [place]  Phone: [phone]     "

## 2019-08-24 ENCOUNTER — MYC MEDICAL ADVICE (OUTPATIENT)
Dept: FAMILY MEDICINE | Facility: OTHER | Age: 29
End: 2019-08-24

## 2019-08-26 ENCOUNTER — ANCILLARY PROCEDURE (OUTPATIENT)
Dept: GENERAL RADIOLOGY | Facility: OTHER | Age: 29
End: 2019-08-26
Attending: PHYSICAL MEDICINE & REHABILITATION
Payer: MEDICARE

## 2019-08-26 ENCOUNTER — OFFICE VISIT (OUTPATIENT)
Dept: ORTHOPEDICS | Facility: OTHER | Age: 29
End: 2019-08-26
Payer: MEDICARE

## 2019-08-26 VITALS
DIASTOLIC BLOOD PRESSURE: 60 MMHG | HEIGHT: 62 IN | SYSTOLIC BLOOD PRESSURE: 98 MMHG | BODY MASS INDEX: 31.76 KG/M2 | WEIGHT: 172.6 LBS

## 2019-08-26 DIAGNOSIS — M79.671 ACUTE FOOT PAIN, RIGHT: Primary | ICD-10-CM

## 2019-08-26 DIAGNOSIS — M79.671 ACUTE FOOT PAIN, RIGHT: ICD-10-CM

## 2019-08-26 PROCEDURE — 99213 OFFICE O/P EST LOW 20 MIN: CPT | Performed by: PHYSICAL MEDICINE & REHABILITATION

## 2019-08-26 PROCEDURE — 73630 X-RAY EXAM OF FOOT: CPT | Mod: RT

## 2019-08-26 ASSESSMENT — MIFFLIN-ST. JEOR: SCORE: 1456.16

## 2019-08-26 NOTE — LETTER
8/26/2019         RE: Katy Islas  [address]  [place] [address]        Dear Colleague,    Thank you for referring your patient, Katy Islas, to the [facility]. Please see a copy of my visit note below.    Sports Medicine Clinic Visit    PCP: Aura Rosario    CC: Patient presents with:  Right Foot - Pain      HPI:  Katy Islas is a 29 year old female who is seen as a self referral.   She notes right foot pain that began 3 weeks ago when her horse stepped on her foot. She did feel a pop. She notes that her horse has stepped on her foot before however normally it resolves. She notes pain over great toe into the 1st metatarsal and on the bottom of the medial foot. She had bruising and swelling initially. She notes that sometimes there is a pop that is painful. She notes that she has a dull pain in the big toe with walking.  She rates the pain at a 4/10 at its worst and a 0/10 currently.  Symptoms are relieved with rest, sitting and CBD. Symptoms are worsened by walking and running. She endorses popping.   She denies swelling, grinding, catching, locking, instability, numbness, tingling and weakness.  Other treatment has included cold compresses. She normally wears tennis shoes with walking and running and this helps some.       Review of Systems:  Musculoskeletal: as above  Remainder of review of systems is negative including constitutional, eyes, ENT, CV, pulmonary, GI, , endocrine, skin, hematologic, and neurologic except as noted in HPI or medical history.    History reviewed. No pertinent past surgical/medical/family/social history other than as mentioned in HPI.    Patient Active Problem List   Diagnosis     Hearing loss     Allergic rhinitis     Migraine     Benign neoplasm of skin of lower limb, including hip     Dysmenorrhea     Crohn's colitis (H)     Mild major depression (H)     Anxiety     Chronic pain     Bipolar disorder (H)      Marijuana abuse     Adjustment disorder with mixed anxiety and depressed mood     Herpes simplex virus infection     Gastroesophageal reflux disease without esophagitis     Bee sting allergy     Mild persistent asthma without complication     Chronic bilateral low back pain with left-sided sciatica     Vitamin D deficiency     Atypical mole     Lumbar disc disease with radiculopathy     S/P lumbar fusion     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)     Upper GI bleed - suspected     Tobacco use disorder     History of pseudoseizure     Iliotibial band syndrome affecting lower leg, right     Chondromalacia of right patellofemoral joint     Melanocytic nevus, unspecified location     Dysplastic skin lesion     Patellar maltracking, right     Right anterior knee pain     Hypophosphatemia     Class 1 obesity due to excess calories without serious comorbidity with body mass index (BMI) of 32.0 to 32.9 in adult     Mild intermittent asthma     Cervicalgia     Bulge of cervical disc without myelopathy     Hypertension goal BP (blood pressure) < 130/80     Past Medical History:   Diagnosis Date     Abdominal pain 10/31- 11/4/2005    [facility] admit for Crohn[facility]     Allergic rhinitis, cause unspecified     Allergic rhinitis     Arthritis      C. difficile diarrhea     Past, no current diarrhea.     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, without mention of intractable epilepsy     pseudoseizures diagnosed after extensive neurologic eval     Migraine  07/21/12    D/C 07/22/12-[facility]     Migraine, unspecified, without mention of intractable migraine without mention of status migrainosus     Migraine     Mild intermittent asthma     mild intermittent     Mycoplasma infection in conditions classified elsewhere and of unspecified site      Other chronic pain     Back pain for 6 years     Renal disease     Kidney stones     Unspecified hearing loss     congenital hearing loss     Past Surgical History:   Procedure Laterality Date     AS REMOVAL OF COCCYX  10/18/2017     C FUSION OF SACROILIAC JOINT  10/26/2018     COLONOSCOPY  7/1/2009    [facility].     COLONOSCOPY N/A 7/17/2019    Procedure: Colonoscopy, With Polypectomy And Biopsy;  Surgeon: Edwin Conde MD;  Location: [facility] OR     COLONOSCOPY WITH CO2 INSUFFLATION N/A 7/17/2019    Procedure: COLONOSCOPY, WITH CO2 INSUFFLATION;  Surgeon: Edwin Conde MD;  Location: [facility] OR     COMBINED ESOPHAGOSCOPY, GASTROSCOPY, DUODENOSCOPY (EGD) WITH CO2 INSUFFLATION N/A 4/12/2019    Procedure: COMBINED ESOPHAGOSCOPY, GASTROSCOPY, DUODENOSCOPY (EGD) WITH CO2 INSUFFLATION;  Surgeon: Edwin Conde MD;  Location: [facility] OR     ESOPHAGOSCOPY, GASTROSCOPY, DUODENOSCOPY (EGD), COMBINED N/A 4/12/2019    Procedure: Combined Esophagoscopy, Gastroscopy, Duodenoscopy (Egd), Biopsy Single Or Multiple;  Surgeon: Edwin Conde MD;  Location: [facility] OR     FUSION SPINE POSTERIOR MINIMALLY INVASIVE ONE LEVEL N/A 2/23/2017    Procedure: FUSION SPINE POSTERIOR MINIMALLY INVASIVE ONE LEVEL;  L4-5 Oblique Lateral Lumbar Interbody Fusion   Epidural steroid injection.   Transpedicular Bone marrow aspiration;  Surgeon: Jeniffer Eugene MD;  Location: [facility] OR     HC COLONOSCOPY THRU STOMA WITH BIOPSY  10/29/2003    Impression is that of normal appearing colonoscopy, without evidence of rectal bleeding.     HC COLONOSCOPY THRU STOMA, DIAGNOSTIC  10/00    normal     HC COLONOSCOPY THRU  STOMA, DIAGNOSTIC  Oct 2009    Dr López- normal     HC EEG AWAKE AND SLEEP      abnormal     HC MRI BRAIN W/O CONTRAST  12/00    normal     HC REMOVAL GALLBLADDER  8/5/2009    [facility].     HC UGI ENDOSCOPY DIAG W BIOPSY  11/11/09    Normal esophagus     HC UGI ENDOSCOPY, SIMPLE EXAM  7/00, 10/00    mild chronic esophagitis and duodenitis, neg H pylori     HC UGI ENDOSCOPY, SIMPLE EXAM  01/20/2005    [facility]oden[facility] biopsies[facility]     HC UGI ENDOSCOPY, SIMPLE EXAM  7/1/2009    [facility].      UGI ENDOSCOPY, SIMPLE EXAM  11/11/2009    attempted upper GI, pt. could not tolerate procedure:[facility]     ORTHOPEDIC SURGERY  October 19,2011    diskectomy L4-L5     Family History   Problem Relation Age of Onset     Gastrointestinal Disease Brother         severe Crohn's     Neurologic Disorder Brother         Seizures post head injury     Depression Brother      Substance Abuse Brother      Genitourinary Problems Father         kidney stones     Diabetes Father      Heart Disease Father         Open heart surgery     Breast Cancer Maternal Grandmother      Parkinsonism Maternal Grandmother      Cerebrovascular Disease Paternal Grandmother      Cancer Maternal Grandfather         Lung     Cardiovascular Paternal Grandfather         Heart Attack     Substance Abuse Mother         in recovery x1 year      Lung Cancer Paternal Uncle      Cancer Paternal Uncle      Lung Cancer Maternal Uncle      Social History     Socioeconomic History     Marital status: Single     Spouse name: Not on file     Number of children: Not on file     Years of education: Not on file     Highest education level: Not on file   Occupational History     Not on file   Social Needs     Financial resource strain: Not on file     Food insecurity:     Worry: Not on file     Inability: Not on file     Transportation needs:     Medical: Not on  file     Non-medical: Not on file   Tobacco Use     Smoking status: Current Every Day Smoker     Packs/day: 0.50     Years: 5.00     Pack years: 2.50     Types: Cigarettes     Smokeless tobacco: Never Used   Substance and Sexual Activity     Alcohol use: Not Currently     Comment: Not since last July     Drug use: Yes     Types: Marijuana     Comment: Medical Marijuana currently-- More CBD     Sexual activity: Not Currently     Partners: Male     Birth control/protection: Condom, Injection   Lifestyle     Physical activity:     Days per week: Not on file     Minutes per session: Not on file     Stress: Not on file   Relationships     Social connections:     Talks on phone: Not on file     Gets together: Not on file     Attends Orthodox service: Not on file     Active member of club or organization: Not on file     Attends meetings of clubs or organizations: Not on file     Relationship status: Not on file     Intimate partner violence:     Fear of current or ex partner: Not on file     Emotionally abused: Not on file     Physically abused: Not on file     Forced sexual activity: Not on file   Other Topics Concern      Service No     Blood Transfusions No     Caffeine Concern No     Occupational Exposure No     Hobby Hazards No     Sleep Concern No     Stress Concern No     Weight Concern No     Special Diet No     Back Care No     Exercise Yes     Bike Helmet No     Seat Belt Yes     Self-Exams Yes     Parent/sibling w/ CABG, MI or angioplasty before 65F 55M? Yes     Comment: father late 30's early 40's    Social History Narrative     Not on file       She does not work. She enjoys riding horses.     Current Outpatient Medications   Medication     acetaminophen (TYLENOL) 500 MG tablet     albuterol (2.5 MG/3ML) 0.083% neb solution     albuterol (PROAIR HFA/PROVENTIL HFA/VENTOLIN HFA) 108 (90 Base) MCG/ACT Inhaler     ARIPiprazole (ABILIFY) 15 MG tablet     aspirin (ASA) 81 MG tablet     azelastine (OPTIVAR)  0.05 % ophthalmic solution     busPIRone (BUSPAR) 15 MG tablet     clonazePAM (KLONOPIN) 0.5 MG tablet     cyclobenzaprine (FLEXERIL) 10 MG tablet     dicyclomine (BENTYL) 20 MG tablet     diphenhydrAMINE (BENADRYL) 50 MG capsule     DULoxetine (CYMBALTA) 60 MG EC capsule     EPINEPHrine (EPIPEN/ADRENACLICK/OR ANY BX GENERIC EQUIV) 0.3 MG/0.3ML injection 2-pack     fentaNYL (DURAGESIC) 25 mcg/hr 72 hr patch     gabapentin (NEURONTIN) 300 MG capsule     ibuprofen (ADVIL/MOTRIN) 100 MG tablet     levocetirizine (XYZAL) 5 MG tablet     losartan (COZAAR) 100 MG tablet     medical cannabis (Patient's own supply)     medroxyPROGESTERone (DEPO-PROVERA) 150 MG/ML IM injection     methocarbamol (ROBAXIN) 750 MG tablet     metoprolol succinate ER (TOPROL-XL) 100 MG 24 hr tablet     mupirocin (BACTROBAN) 2 % external ointment     nicotine (NICODERM CQ) 14 MG/24HR 24 hr patch     NONFORMULARY     nystatin (MYCOSTATIN) cream     pantoprazole (PROTONIX) 40 MG EC tablet     prazosin (MINIPRESS) 1 MG capsule     prochlorperazine (COMPAZINE) 10 MG tablet     prochlorperazine (COMPAZINE) 25 MG suppository     rizatriptan (MAXALT-MLT) 5 MG ODT tab     sucralfate (CARAFATE) 1 GM tablet     Current Facility-Administered Medications   Medication     medroxyPROGESTERone (DEPO-PROVERA) injection 150 mg     Allergies   Allergen Reactions     Iohexol Hives     Other reaction(s): Hives  IV contrast; patient states she tolerates contrast if she gets benadryl before  IV contrast; patient states she tolerates contrast if she gets benadryl before     Ativan [Lorazepam] Hives     At the IV site     Baclofen      hives     Bees Hives, Swelling and Difficulty breathing     Caffeine      Contrast Dye      Hives,   Updated 5/10/2016 CT Contrast.     Iodine Hives     Methocarbamol Swelling     Metoclopramide      Other reaction(s): Tremors  LW Reaction: shaking/sweating     Midazolam      Other reaction(s): Agitation     Monosodium Glutamate       "Morphine Other (See Comments)     Difficulty with urination     Nsaids Other (See Comments)     GI BLEED x2     Other (Do Not Use) Other (See Comments)     Xanaflex- pt becomes disoriented and loses bladder control     Reglan [Metoclopramide Hcl] Other (See Comments)     shaking     Soma [Carisoprodol] Visual Disturbance     Sleep walking     Tizanidine      Topamax Other (See Comments)     Topamax [Topiramate] Nausea     Tingling  GI/Vomit     Tramadol      Severe Headache, Seizure Risk     Tylenol W/Codeine [Acetaminophen-Codeine] Nausea and Itching     Tylenol 3     Valium Other (See Comments)     Becomes aggressive and angry     Versed Other (See Comments)     Zolmitriptan      Makes face feel like its twitching     Droperidol Anxiety     Flu Virus Vaccine Rash      Arm swelling         Objective:  BP 98/60   Ht 1.575 m (5' 2\")   Wt 78.3 kg (172 lb 9.6 oz)   BMI 31.57 kg/m       General: Alert and in no distress    Head: Normocephalic, atraumatic  Eyes: no scleral icterus or conjunctival erythema   Oropharynx:  Mucous membranes moist  Skin: no erythema, petechiae, or jaundice  CV: regular rhythm by palpation, 2+ distal pulses  Resp: normal respiratory effort without conversational dyspnea   Psych: normal mood and affect    Gait: Limping  Neuro: Motor strength and sensation as noted below    Musculoskeletal:    Bilateral Foot and Ankle Exam:    Inspection:       no visible ecchymosis       no visible edema or effusion    Tender:  -Right first MTP  -Right first metatarsal    ROM:        Full active ROM with ankle dorsiflexion, plantarflexion, inversion, eversion, great toe dorsiflexion, and great toe plantarflexion    Strength:       ankle dorsiflexion 5/5 bilaterally       plantarflexion 5/5 bilaterally       inversion 5/5 bilaterally       eversion 5/5 bilaterally       great toe dorsiflexion 5/5 bilaterally       great toe plantarflexion 5/5 bilaterally    Neurovascular:       2+ peripheral pulses " bilaterally        sensation grossly intact        Radiology:  X-rays ordered and independent visualization of images performed and reviewed with Katy.    Recent Results (from the past 24 hour(s))   XR Foot Right G/E 3 Views    Narrative    FOOT THREE VIEWS RIGHT  8/26/2019 11:32 AM     HISTORY: pain over the great toe and 1st MT; horse stepped on her  foot.; Acute foot pain, right    COMPARISON: None.      Impression    IMPRESSION: No acute fracture or malalignment. Mild first MTP joint  degenerative changes. Shortening of the third proximal phalanx, may be  congenital.    LALITA GARCIA MD       Assessment:  1. Acute foot pain, right        Plan:  Discussed the assessment with the patient and developed a plan together:  -Suspect contusion.    -Recommend hard soled shoe. May wean out of the shoe when walking is not painful and can be done without a limp  -Ice or ice massage 15-20 minutes for pain relief or swelling as needed  -Patient's preferred over the counter medication as directed on packaging as needed for pain or soreness.  -No running  -Limit excessive walking  -Wear supportive footwear      Follow Up: 2-3 weeks or sooner if symptoms fail to improve or worsen. Please call with any questions or concerns.       Leisa Grant MD, [facility]  [facility]    Again, thank you for allowing me to participate in the care of your patient.        Sincerely,        Humera Grant MD

## 2019-08-26 NOTE — PROGRESS NOTES
Sports Medicine Clinic Visit    PCP: Aura Rosario    CC: Patient presents with:  Right Foot - Pain      HPI:  Katy Islas is a 29 year old female who is seen as a self referral.   She notes right foot pain that began 3 weeks ago when her horse stepped on her foot. She did feel a pop. She notes that her horse has stepped on her foot before however normally it resolves. She notes pain over great toe into the 1st metatarsal and on the bottom of the medial foot. She had bruising and swelling initially. She notes that sometimes there is a pop that is painful. She notes that she has a dull pain in the big toe with walking.  She rates the pain at a 4/10 at its worst and a 0/10 currently.  Symptoms are relieved with rest, sitting and CBD. Symptoms are worsened by walking and running. She endorses popping.   She denies swelling, grinding, catching, locking, instability, numbness, tingling and weakness.  Other treatment has included cold compresses. She normally wears tennis shoes with walking and running and this helps some.       Review of Systems:  Musculoskeletal: as above  Remainder of review of systems is negative including constitutional, eyes, ENT, CV, pulmonary, GI, , endocrine, skin, hematologic, and neurologic except as noted in HPI or medical history.    History reviewed. No pertinent past surgical/medical/family/social history other than as mentioned in HPI.    Patient Active Problem List   Diagnosis     Hearing loss     Allergic rhinitis     Migraine     Benign neoplasm of skin of lower limb, including hip     Dysmenorrhea     Crohn's colitis (H)     Mild major depression (H)     Anxiety     Chronic pain     Bipolar disorder (H)     Marijuana abuse     Adjustment disorder with mixed anxiety and depressed mood     Herpes simplex virus infection     Gastroesophageal reflux disease without esophagitis     Bee sting allergy     Mild persistent asthma without complication     Chronic  bilateral low back pain with left-sided sciatica     Vitamin D deficiency     Atypical mole     Lumbar disc disease with radiculopathy     S/P lumbar fusion     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)     Upper GI bleed - suspected     Tobacco use disorder     History of pseudoseizure     Iliotibial band syndrome affecting lower leg, right     Chondromalacia of right patellofemoral joint     Melanocytic nevus, unspecified location     Dysplastic skin lesion     Patellar maltracking, right     Right anterior knee pain     Hypophosphatemia     Class 1 obesity due to excess calories without serious comorbidity with body mass index (BMI) of 32.0 to 32.9 in adult     Mild intermittent asthma     Cervicalgia     Bulge of cervical disc without myelopathy     Hypertension goal BP (blood pressure) < 130/80     Past Medical History:   Diagnosis Date     Abdominal pain 10/31- 11/4/2005    [facility]     Allergic rhinitis, cause unspecified     Allergic rhinitis     Arthritis      C. difficile diarrhea     Past, no current diarrhea.     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, without mention of intractable epilepsy     pseudoseizures diagnosed after extensive neurologic eval     Migraine 07/21/12    D/C 07/22/12-Serena Bondet     Migraine, unspecified, without mention of intractable migraine without mention of status migrainosus     Migraine     Mild intermittent asthma     mild intermittent     Mycoplasma infection in conditions classified  elsewhere and of unspecified site      Other chronic pain     Back pain for 6 years     Renal disease     Kidney stones     Unspecified hearing loss     congenital hearing loss     Past Surgical History:   Procedure Laterality Date     AS REMOVAL OF COCCYX  10/18/2017     C FUSION OF SACROILIAC JOINT  10/26/2018     COLONOSCOPY  7/1/2009    [facility].     COLONOSCOPY N/A 7/17/2019    Procedure: Colonoscopy, With Polypectomy And Biopsy;  Surgeon: Edwin Conde MD;  Location: [facility]     COLONOSCOPY WITH CO2 INSUFFLATION N/A 7/17/2019    Procedure: COLONOSCOPY, WITH CO2 INSUFFLATION;  Surgeon: Edwin Conde MD;  Location: [facility]     COMBINED ESOPHAGOSCOPY, GASTROSCOPY, DUODENOSCOPY (EGD) WITH CO2 INSUFFLATION N/A 4/12/2019    Procedure: COMBINED ESOPHAGOSCOPY, GASTROSCOPY, DUODENOSCOPY (EGD) WITH CO2 INSUFFLATION;  Surgeon: Edwin Conde MD;  Location: [facility] OR     ESOPHAGOSCOPY, GASTROSCOPY, DUODENOSCOPY (EGD), COMBINED N/A 4/12/2019    Procedure: Combined Esophagoscopy, Gastroscopy, Duodenoscopy (Egd), Biopsy Single Or Multiple;  Surgeon: Edwin Conde MD;  Location: [facility] OR     FUSION SPINE POSTERIOR MINIMALLY INVASIVE ONE LEVEL N/A 2/23/2017    Procedure: FUSION SPINE POSTERIOR MINIMALLY INVASIVE ONE LEVEL;  L4-5 Oblique Lateral Lumbar Interbody Fusion   Epidural steroid injection.   Transpedicular Bone marrow aspiration;  Surgeon: Jeniffer Eugene MD;  Location: [facility] OR     HC COLONOSCOPY THRU STOMA WITH BIOPSY  10/29/2003    Impression is that of normal appearing colonoscopy, without evidence of rectal bleeding.     HC COLONOSCOPY THRU STOMA, DIAGNOSTIC  10/00    normal     HC COLONOSCOPY THRU STOMA, DIAGNOSTIC  Oct 2009    Dr López- normal     HC EEG AWAKE AND SLEEP      abnormal     HC MRI BRAIN W/O CONTRAST  12/00    normal     HC REMOVAL GALLBLADDER  8/5/2009    [facility].     HC UGI ENDOSCOPY DIAG W BIOPSY  11/11/09     Normal esophagus     HC UGI ENDOSCOPY, SIMPLE EXAM  7/00, 10/00    mild chronic esophagitis and duodenitis, neg H pylori     HC UGI ENDOSCOPY, SIMPLE EXAM  01/20/2005    Esophagogastroduodenoscopy, colonoscopy with biopsies.  [facility]     HC UGI ENDOSCOPY, SIMPLE EXAM  7/1/2009    [facility] [place]     HC UGI ENDOSCOPY, SIMPLE EXAM  11/11/2009    attempted upper GI, pt. could not tolerate procedure:[facility]     ORTHOPEDIC SURGERY  October 19,2011    diskectomy L4-L5     Family History   Problem Relation Age of Onset     Gastrointestinal Disease Brother         severe Crohn's     Neurologic Disorder Brother         Seizures post head injury     Depression Brother      Substance Abuse Brother      Genitourinary Problems Father         kidney stones     Diabetes Father      Heart Disease Father         Open heart surgery     Breast Cancer Maternal Grandmother      Parkinsonism Maternal Grandmother      Cerebrovascular Disease Paternal Grandmother      Cancer Maternal Grandfather         Lung     Cardiovascular Paternal Grandfather         Heart Attack     Substance Abuse Mother         in recovery x1 year      Lung Cancer Paternal Uncle      Cancer Paternal Uncle      Lung Cancer Maternal Uncle      Social History     Socioeconomic History     Marital status: Single     Spouse name: Not on file     Number of children: Not on file     Years of education: Not on file     Highest education level: Not on file   Occupational History     Not on file   Social Needs     Financial resource strain: Not on file     Food insecurity:     Worry: Not on file     Inability: Not on file     Transportation needs:     Medical: Not on file     Non-medical: Not on file   Tobacco Use     Smoking status: Current Every Day Smoker     Packs/day: 0.50     Years: 5.00     Pack years: 2.50     Types: Cigarettes     Smokeless tobacco: Never Used   Substance and Sexual Activity     Alcohol use: Not  Currently     Comment: Not since last July     Drug use: Yes     Types: Marijuana     Comment: Medical Marijuana currently-- More CBD     Sexual activity: Not Currently     Partners: Male     Birth control/protection: Condom, Injection   Lifestyle     Physical activity:     Days per week: Not on file     Minutes per session: Not on file     Stress: Not on file   Relationships     Social connections:     Talks on phone: Not on file     Gets together: Not on file     Attends Caodaism service: Not on file     Active member of club or organization: Not on file     Attends meetings of clubs or organizations: Not on file     Relationship status: Not on file     Intimate partner violence:     Fear of current or ex partner: Not on file     Emotionally abused: Not on file     Physically abused: Not on file     Forced sexual activity: Not on file   Other Topics Concern      Service No     Blood Transfusions No     Caffeine Concern No     Occupational Exposure No     Hobby Hazards No     Sleep Concern No     Stress Concern No     Weight Concern No     Special Diet No     Back Care No     Exercise Yes     Bike Helmet No     Seat Belt Yes     Self-Exams Yes     Parent/sibling w/ CABG, MI or angioplasty before 65F 55M? Yes     Comment: father late 30's early 40's    Social History Narrative     Not on file       She does not work. She enjoys riding horses.     Current Outpatient Medications   Medication     acetaminophen (TYLENOL) 500 MG tablet     albuterol (2.5 MG/3ML) 0.083% neb solution     albuterol (PROAIR HFA/PROVENTIL HFA/VENTOLIN HFA) 108 (90 Base) MCG/ACT Inhaler     ARIPiprazole (ABILIFY) 15 MG tablet     aspirin (ASA) 81 MG tablet     azelastine (OPTIVAR) 0.05 % ophthalmic solution     busPIRone (BUSPAR) 15 MG tablet     clonazePAM (KLONOPIN) 0.5 MG tablet     cyclobenzaprine (FLEXERIL) 10 MG tablet     dicyclomine (BENTYL) 20 MG tablet     diphenhydrAMINE (BENADRYL) 50 MG capsule     DULoxetine (CYMBALTA) 60  MG EC capsule     EPINEPHrine (EPIPEN/ADRENACLICK/OR ANY BX GENERIC EQUIV) 0.3 MG/0.3ML injection 2-pack     fentaNYL (DURAGESIC) 25 mcg/hr 72 hr patch     gabapentin (NEURONTIN) 300 MG capsule     ibuprofen (ADVIL/MOTRIN) 100 MG tablet     levocetirizine (XYZAL) 5 MG tablet     losartan (COZAAR) 100 MG tablet     medical cannabis (Patient's own supply)     medroxyPROGESTERone (DEPO-PROVERA) 150 MG/ML IM injection     methocarbamol (ROBAXIN) 750 MG tablet     metoprolol succinate ER (TOPROL-XL) 100 MG 24 hr tablet     mupirocin (BACTROBAN) 2 % external ointment     nicotine (NICODERM CQ) 14 MG/24HR 24 hr patch     NONFORMULARY     nystatin (MYCOSTATIN) cream     pantoprazole (PROTONIX) 40 MG EC tablet     prazosin (MINIPRESS) 1 MG capsule     prochlorperazine (COMPAZINE) 10 MG tablet     prochlorperazine (COMPAZINE) 25 MG suppository     rizatriptan (MAXALT-MLT) 5 MG ODT tab     sucralfate (CARAFATE) 1 GM tablet     Current Facility-Administered Medications   Medication     medroxyPROGESTERone (DEPO-PROVERA) injection 150 mg     Allergies   Allergen Reactions     Iohexol Hives     Other reaction(s): Hives  IV contrast; patient states she tolerates contrast if she gets benadryl before  IV contrast; patient states she tolerates contrast if she gets benadryl before     Ativan [Lorazepam] Hives     At the IV site     Baclofen      hives     Bees Hives, Swelling and Difficulty breathing     Caffeine      Contrast Dye      Hives,   Updated 5/10/2016 CT Contrast.     Iodine Hives     Methocarbamol Swelling     Metoclopramide      Other reaction(s): Tremors  LW Reaction: shaking/sweating     Midazolam      Other reaction(s): Agitation     Monosodium Glutamate      Morphine Other (See Comments)     Difficulty with urination     Nsaids Other (See Comments)     GI BLEED x2     Other (Do Not Use) Other (See Comments)     Xanaflex- pt becomes disoriented and loses bladder control     Reglan [Metoclopramide Hcl] Other (See  "Comments)     shaking     Soma [Carisoprodol] Visual Disturbance     Sleep walking     Tizanidine      Topamax Other (See Comments)     Topamax [Topiramate] Nausea     Tingling  GI/Vomit     Tramadol      Severe Headache, Seizure Risk     Tylenol W/Codeine [Acetaminophen-Codeine] Nausea and Itching     Tylenol 3     Valium Other (See Comments)     Becomes aggressive and angry     Versed Other (See Comments)     Zolmitriptan      Makes face feel like its twitching     Droperidol Anxiety     Flu Virus Vaccine Rash      Arm swelling         Objective:  BP 98/60   Ht 1.575 m (5' 2\")   Wt 78.3 kg (172 lb 9.6 oz)   BMI 31.57 kg/m      General: Alert and in no distress    Head: Normocephalic, atraumatic  Eyes: no scleral icterus or conjunctival erythema   Oropharynx:  Mucous membranes moist  Skin: no erythema, petechiae, or jaundice  CV: regular rhythm by palpation, 2+ distal pulses  Resp: normal respiratory effort without conversational dyspnea   Psych: normal mood and affect    Gait: Limping  Neuro: Motor strength and sensation as noted below    Musculoskeletal:    Bilateral Foot and Ankle Exam:    Inspection:       no visible ecchymosis       no visible edema or effusion    Tender:  -Right first MTP  -Right first metatarsal    ROM:        Full active ROM with ankle dorsiflexion, plantarflexion, inversion, eversion, great toe dorsiflexion, and great toe plantarflexion    Strength:       ankle dorsiflexion 5/5 bilaterally       plantarflexion 5/5 bilaterally       inversion 5/5 bilaterally       eversion 5/5 bilaterally       great toe dorsiflexion 5/5 bilaterally       great toe plantarflexion 5/5 bilaterally    Neurovascular:       2+ peripheral pulses bilaterally        sensation grossly intact        Radiology:  X-rays ordered and independent visualization of images performed and reviewed with Katy.    Recent Results (from the past 24 hour(s))   XR Foot Right G/E 3 Views    Narrative    FOOT THREE VIEWS RIGHT  " 8/26/2019 11:32 AM     HISTORY: pain over the great toe and 1st MT; horse stepped on her  foot.; Acute foot pain, right    COMPARISON: None.      Impression    IMPRESSION: No acute fracture or malalignment. Mild first MTP joint  degenerative changes. Shortening of the third proximal phalanx, may be  congenital.    LALITA GARCIA MD       Assessment:  1. Acute foot pain, right        Plan:  Discussed the assessment with the patient and developed a plan together:  -Suspect contusion.    -Recommend hard soled shoe. May wean out of the shoe when walking is not painful and can be done without a limp  -Ice or ice massage 15-20 minutes for pain relief or swelling as needed  -Patient's preferred over the counter medication as directed on packaging as needed for pain or soreness.  -No running  -Limit excessive walking  -Wear supportive footwear      Follow Up: 2-3 weeks or sooner if symptoms fail to improve or worsen. Please call with any questions or concerns.       Leisa Grant MD, CAQ Sports Medicine  [facility]

## 2019-08-26 NOTE — TELEPHONE ENCOUNTER
Last Read in MyChart  8/26/2019 11:30 AM by Katy Islas    Next 5 appointments (look out 90 days)    Aug 28, 2019 10:00 AM CDT  Office Visit with Wilson Ibarra PA-C, Assignments Open, KIS ASL, ASL IS  [facility] [address] [address]  [phone]   Sep 04, 2019 10:00 AM CDT  MyChart Short with Aura Rosario PA-C  [facility] [address] [address]  [phone]   Sep 04, 2019 11:00 AM CDT  Return Visit with Bernie Wilson RD  [facility]) [address] [place] [id number]  [phone]

## 2019-08-26 NOTE — PATIENT INSTRUCTIONS
Today's Plan of Care:  -Hard soled shoe. May wean out of the shoe when walking is not painful and can be done without a limp  -Ice or ice massage 15-20 minutes for pain relief or swelling as needed  -Patient's preferred over the counter medication as directed on packaging as needed for pain or soreness.  -No running  -Limit excessive walking  -Wear supportive footwear      Follow Up: 2-3 weeks or sooner if symptoms fail to improve or worsen. Please call with any questions or concerns.

## 2019-08-27 ENCOUNTER — MYC MEDICAL ADVICE (OUTPATIENT)
Dept: FAMILY MEDICINE | Facility: OTHER | Age: 29
End: 2019-08-27

## 2019-08-29 ENCOUNTER — NURSE TRIAGE (OUTPATIENT)
Dept: NURSING | Facility: CLINIC | Age: 29
End: 2019-08-29

## 2019-08-29 ENCOUNTER — MYC MEDICAL ADVICE (OUTPATIENT)
Dept: FAMILY MEDICINE | Facility: OTHER | Age: 29
End: 2019-08-29

## 2019-08-29 ENCOUNTER — TRANSFERRED RECORDS (OUTPATIENT)
Dept: HEALTH INFORMATION MANAGEMENT | Facility: CLINIC | Age: 29
End: 2019-08-29

## 2019-08-29 NOTE — PROGRESS NOTES
"  Subjective     Katy Islas is a 29 year old female who presents to clinic today for the following health issues:    HPI     ED/UC Followup:    Facility:  [facility]   Date of visit: 8/29-9/2  Reason for visit: Back Pain, Sepsis(per note), fever, headache  Current Status: feeling fine but back hurts due to lifting 75lbs of soap and stuff    - Antibiotic - Still has antibiotic     Ceftin 500 mg twice a day for 10 days started on 8/30/19   - Feeling better except for back          Chronic/Recurring Back Pain Follow Up      Where is your back pain located? (Select all that apply) low back center and middle of back center    How would you describe your back pain?  Cramping, sharp and dull ache    Where does your back pain spread? the right and left buttock, the right and left  thigh, the right and left  knee and the right and left foot    Since your last clinic visit for back pain, how has your pain changed? gradually worsening    Does your back pain interfere with your job? Not applicable    Since your last visit, have you tried any new treatment? No     - Missed pain clinic appointment since in the ER      Still working with scheduling for pool therapy   - [place] didn't have patch, so went to [place], got different type and only stayed on 1 day   - Using tegaderm   - Went to [place], they took off for MRI then gave her new patch and stayed on for 4 days   - Tried liquid oxycodone with the patch, didn't help at all       2 mg   - They gave her this one in the ER - Duragesic 25 mg/hr   - Worse after injection 3 weeks ago              Review of Systems   ROS COMP: Constitutional, HEENT, cardiovascular, pulmonary, gi and gu systems are negative, except as otherwise noted.      Objective    /72   Pulse 124   Temp 98.2  F (36.8  C) (Temporal)   Resp 24   Ht 1.575 m (5' 2\")   Wt 82.5 kg (181 lb 12.8 oz)   SpO2 96%   BMI 33.25 kg/m    Body mass index is 33.25 kg/m .  Physical Exam "   GENERAL APPEARANCE: healthy, alert and no distress, appears in pain but when talking about her dog and hobbies acts completely normal   EYES: Eyes grossly normal to inspection, PERRLA, conjunctivae and sclerae without injection or discharge, EOM intact   RESP: Lungs clear to auscultation - no rales, rhonchi or wheezes    CV: Regular rates and rhythm, normal S1 S2, no S3 or S4, no murmur, click or rub, no peripheral edema and peripheral pulses strong and symmetric bilaterally   MS: No musculoskeletal defects are noted and gait is age appropriate without ataxia   SKIN: No suspicious lesions or rashes, hydration status appears adeuqate with normal skin turgor   PSYCH: Alert and oriented x3; speech- coherent , normal rate and volume; able to articulate logical thoughts, able to abstract reason, no tangential thoughts, no hallucinations or delusions, mentation appears normal, Mood is euthymic. Affect is appropriate for this mood state and bright. Thought content is free of suicidal ideation, hallucinations, and delusions. Dress is adequate and upkept. Eye contact is good during conversation.       Diagnostic Test Results:  Labs reviewed in Epic        Assessment & Plan       ICD-10-CM    1. Sepsis, due to unspecified organism (H) A41.9    2. Chronic pain syndrome G89.4 fentaNYL (DURAGESIC) 25 mcg/hr 72 hr patch   3. Chronic bilateral low back pain with left-sided sciatica M54.42 fentaNYL (DURAGESIC) 25 mcg/hr 72 hr patch    G89.29    4. Migraine with aura and without status migrainosus, not intractable G43.109 rizatriptan (MAXALT-MLT) 5 MG ODT     1. Sepsis   - Reviewed 5 ED notes from this past week   - Does show reported Sepsis with elevated WBC, UA and lumbar puncture was normal  - They advised hospitalization due to concern for encephalitis but she declined   - Was placed on Ceftin 500 mg twice a day for 10 days started on 8/30/19   - Fever and headaches have resolved   - Extensive labs done, WBC from yesterday was  normalized   - Discussed I see no need for repeat labs since they were normal, but she should continue with her full course of antibiotics     2 & 3. Pain   - Patient had ED visits for her chronic pain as well as the infection, requesting higher and higher doses of pain medication  - Currently on Fentanyl patch 25 mcg      Reports that was given liquid oxycodone for break through in the ED and it didn't help   - Discussed that her risks are high even on this dose and that I am not willing to go higher, especially since part of this is self induced since as above lifted 75 lb bag of soap (wasn't in enough pain to stop her from doing this)  - Has had issues with sticking, was given Apotex brand in [place] ED, will have the pharmacy order for her   - Continue on this dose for now and recommend she rest and stretch, no heavy lifting and plan from back specialist, she can also try taking Gabapentin during the day (was only taking at night due to sedation, discussed I want her to rest, stay home, no driving)   - She also missed her pain clinic appointment due to ED visits and has yet to start pool therapy   - Discussed risks of overdose, over sedation, addiction, and sudden death with medication regimen she is on     Plan to patient  - Rest, no lifting greater than 10 lbs   - Reschedule appointment with pain clinic   - Gabapentin      1-2 capsules three times a day until pain improves   - Continue Fentanyl   - Continue Flexeril   - Recheck 2 weeks     Pasted and updated from previous note as patient's back history is well known to writer   As previously, patient with complicated spinal history including L4-5 disectomy (10/19/11), L4-5 fusion (2/23/17), coccyx fracture and removal (complicated by infection - 10/18/17), and SI Joint fusion (10/26/18)   - Records received from [facility] - Dr. 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 Millan  - [facility] on 7/18/19 - L4-5-S1 trans foraminal injection      Had injection on 8/15/19 - lumbar pain has only increased       Needs to return for follow up   - Was told to start physical therapy      Is now scheduled with [facility] in [place] for pool therapy to start next week (?)   - Patient is on medical marijuana for mood/PTSD issues (following with counseling and psychiatry as well)   - Icing regularly along with stretches from previous physical therapy   - Also taking Gabapentin and Flexeril   -  reviewed   - CSA - 4/24/19   - Fentanyl patch 25 mg/72 hours, MME 60       The patient indicates understanding of these issues and agrees with the plan.    Return in about 2 weeks (around 9/18/2019).    Due to language barrier, an  was present during the history-taking and subsequent discussion (and for part of the physical exam) with this patient.      Aura Tomlinson-LOVE Moss  [facility]

## 2019-08-30 LAB
ALT SERPL-CCNC: 30 IU/L (ref 12–68)
AST SERPL-CCNC: 13 IU/L (ref 15–37)
CREAT SERPL-MCNC: 1.15 MG/DL (ref 0.55–1.02)
GFR SERPL CREATININE-BSD FRML MDRD: 55 ML/MIN
GLUCOSE SERPL-MCNC: 137 MG/DL (ref 70–110)
INR PPP: 1.1 (ref 0.9–1.1)
POTASSIUM SERPL-SCNC: 3.9 MMOL/L (ref 3.5–5.1)

## 2019-08-30 NOTE — TELEPHONE ENCOUNTER
Last Read in MyChart  8/30/2019  8:34 AM by Katy Islas    Next 5 appointments (look out 90 days)    Sep 04, 2019 10:00 AM CDT  [facility], Dayana Roldan  [facility] [address] [place] [phone]  [phone]   Sep 04, 2019 11:00 AM CDT  Return Visit with Bernie Wilson RD, Dayana Roldan  [facility]) [address] [place] [id number]  [phone]

## 2019-08-30 NOTE — TELEPHONE ENCOUNTER
"\"I am having low back pain. I have had it for months. My  fentaNYL (DURAGESIC) 25 mcg/hr 72 hr patch 10      Patch keeps falling off. The pain is 7/10 but I also have numbness in my butt and legs. It feels like pins and needles. \" denies other sx. Triaged and advised Er per protocol. Pt prefers an appt. Transferred to scheduling.  Paradise Hassan RN [facility]         Reason for Disposition    Numbness in groin or rectal area (i.e., loss of sensation)    Additional Information    Negative: Major injury to the back (e.g., MVA, fall > 10 feet or 3 meters, penetrating injury, etc.)    Negative: Followed a tailbone injury    Negative: [1] Pain in the upper back over the ribs (rib cage) AND [2] radiates (travels, goes) into chest    Negative: [1] Pain in the upper back over the ribs (rib cage) AND [2] worsened by coughing (or clearly increases with breathing)    Negative: Back pain during pregnancy    Negative: Pain mainly in flank (i.e., in the side, over the lower ribs or just below the ribs)    Negative: [1] SEVERE back pain (e.g., excruciating) AND [2] sudden onset AND [3] age > 60    Negative: [1] Unable to urinate (or only a few drops) > 4 hours AND     [2] bladder feels very full (e.g., palpable bladder or strong urge to urinate)    Negative: [1] Urinary or bowel incontinence (i.e., loss of bladder or bowel control) AND [2] new onset    Protocols used: BACK PAIN-A-AH      "

## 2019-09-01 ENCOUNTER — MYC MEDICAL ADVICE (OUTPATIENT)
Dept: FAMILY MEDICINE | Facility: OTHER | Age: 29
End: 2019-09-01

## 2019-09-02 ENCOUNTER — TRANSFERRED RECORDS (OUTPATIENT)
Dept: HEALTH INFORMATION MANAGEMENT | Facility: CLINIC | Age: 29
End: 2019-09-02

## 2019-09-02 LAB
ALT SERPL-CCNC: 44 IU/L (ref 12–68)
AST SERPL-CCNC: 18 IU/L (ref 15–37)
CREAT SERPL-MCNC: 0.96 MG/DL (ref 0.55–1.02)
GFR SERPL CREATININE-BSD FRML MDRD: >60 ML/MIN
GLUCOSE SERPL-MCNC: 92 MG/DL (ref 70–110)
INR PPP: 1 (ref 0.9–1.1)
POTASSIUM SERPL-SCNC: 4.1 MMOL/L (ref 3.5–5.1)

## 2019-09-03 NOTE — TELEPHONE ENCOUNTER
Can't work in today. Schedule routinely as ED follow up. Needs 60 min    Zach Rosario PA-C  [facility]

## 2019-09-03 NOTE — TELEPHONE ENCOUNTER
Reason for Call:  Same Day Appointment, Requested Provider:  ZAYNAB Daley     PCP: Aura Rosario    Reason for visit: follow-up hospital visit see below    Duration of symptoms:     Have you been treated for this in the past? 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 Declined another provider    Can we leave a detailed message on this number? YES     Phone number patient can be reached at: Home number on file [phone] (home)    Best Time: any    Call taken on 9/3/2019 at 7:05 AM by Kenzie Maria

## 2019-09-04 ENCOUNTER — OFFICE VISIT (OUTPATIENT)
Dept: FAMILY MEDICINE | Facility: OTHER | Age: 29
End: 2019-09-04
Payer: MEDICARE

## 2019-09-04 ENCOUNTER — TELEPHONE (OUTPATIENT)
Dept: FAMILY MEDICINE | Facility: OTHER | Age: 29
End: 2019-09-04

## 2019-09-04 VITALS
BODY MASS INDEX: 33.45 KG/M2 | SYSTOLIC BLOOD PRESSURE: 110 MMHG | HEART RATE: 124 BPM | TEMPERATURE: 98.2 F | OXYGEN SATURATION: 96 % | HEIGHT: 62 IN | DIASTOLIC BLOOD PRESSURE: 72 MMHG | RESPIRATION RATE: 24 BRPM | WEIGHT: 181.8 LBS

## 2019-09-04 DIAGNOSIS — G43.109 MIGRAINE WITH AURA AND WITHOUT STATUS MIGRAINOSUS, NOT INTRACTABLE: ICD-10-CM

## 2019-09-04 DIAGNOSIS — G89.29 CHRONIC BILATERAL LOW BACK PAIN WITH LEFT-SIDED SCIATICA: ICD-10-CM

## 2019-09-04 DIAGNOSIS — M54.42 CHRONIC BILATERAL LOW BACK PAIN WITH LEFT-SIDED SCIATICA: ICD-10-CM

## 2019-09-04 DIAGNOSIS — A41.9 SEPSIS, DUE TO UNSPECIFIED ORGANISM: Primary | ICD-10-CM

## 2019-09-04 DIAGNOSIS — G89.4 CHRONIC PAIN SYNDROME: ICD-10-CM

## 2019-09-04 PROCEDURE — 99214 OFFICE O/P EST MOD 30 MIN: CPT | Performed by: PHYSICIAN ASSISTANT

## 2019-09-04 PROCEDURE — T1013 SIGN LANG/ORAL INTERPRETER: HCPCS | Mod: U3 | Performed by: PHYSICIAN ASSISTANT

## 2019-09-04 RX ORDER — RIZATRIPTAN BENZOATE 5 MG/1
5-10 TABLET, ORALLY DISINTEGRATING ORAL
Qty: 18 TABLET | Refills: 3 | Status: SHIPPED | OUTPATIENT
Start: 2019-09-04 | End: 2021-02-03

## 2019-09-04 RX ORDER — FENTANYL 25 UG/1
1 PATCH TRANSDERMAL
Qty: 10 PATCH | Refills: 0 | Status: SHIPPED | OUTPATIENT
Start: 2019-09-04 | End: 2019-09-18

## 2019-09-04 ASSESSMENT — PAIN SCALES - GENERAL: PAINLEVEL: SEVERE PAIN (7)

## 2019-09-04 ASSESSMENT — MIFFLIN-ST. JEOR: SCORE: 1497.89

## 2019-09-04 NOTE — PATIENT INSTRUCTIONS
- Rest, no lifting greater than 10 lbs   - Reschedule appointment with pain clinic   - Gabapentin      1-2 capsules three times a day until pain improves   - Continue Fentanyl     - Recheck 2 weeks

## 2019-09-04 NOTE — TELEPHONE ENCOUNTER
Reason for Call:  Medication or medication refill:    Do you use a [facility]?  Name of the pharmacy and phone number for the current request:  [facility]    Name of the medication requested: FENTANYL patch    Other request: Patient is at pharmacy now. Pharmacy stating they have not received rx yet. Please resend.     Can we leave a detailed message on this number? YES    Phone number patient can be reached at: Home number on file [phone] (home)    Best Time: any    Call taken on 9/4/2019 at 12:11 PM by Genie Luo

## 2019-09-08 ENCOUNTER — TRANSFERRED RECORDS (OUTPATIENT)
Dept: HEALTH INFORMATION MANAGEMENT | Facility: CLINIC | Age: 29
End: 2019-09-08

## 2019-09-09 NOTE — PROGRESS NOTES
Subjective     Katy Islas is a 29 year old female who presents to clinic today for the following health issues:    HPI     ED/UC Followup:    Facility:  [facility]   Date of visit: 9/8/19  Reason for visit: sepsis  Current Status: Not feeling well. Felt better for a few days and now not feeling good. Nausea vomiting and pain. Pain in back has subsided. Very tired all the time. Muscles are cramping, she has a headache, sweating at night.     - Follow up with GI - doesn't know      Missed some appointments     She wants to taper off fentanyl - wants to start liquid morphine  - Wants paper prescriptions   - As last appointment, missed pain clinic and didn't set up physical therapy   - Good and bad days   - Not really back pain, but when in hospital pain all over      Now better   - Had to take off patch x2 for MRI   - Patch to come off - tomorrow night                BP Readings from Last 3 Encounters:   09/11/19 122/74   09/04/19 110/72   08/26/19 98/60    Wt Readings from Last 3 Encounters:   09/11/19 81.3 kg (179 lb 3.2 oz)   09/04/19 82.5 kg (181 lb 12.8 oz)   08/26/19 78.3 kg (172 lb 9.6 oz)            Review of Systems   ROS COMP: Constitutional, HEENT, cardiovascular, pulmonary, gi and gu systems are negative, except as otherwise noted.      Objective    /74   Pulse 90   Temp 97  F (36.1  C) (Temporal)   Wt 81.3 kg (179 lb 3.2 oz)   SpO2 100%   BMI 32.78 kg/m    Body mass index is 32.78 kg/m .  Physical Exam   GENERAL APPEARANCE: healthy, alert and no distress  EYES: Eyes grossly normal to inspection, PERRLA, conjunctivae and sclerae without injection or discharge, EOM intact   RESP: Lungs clear to auscultation - no rales, rhonchi or wheezes    CV: Tachycardic and regular rhythm, normal S1 S2, no S3 or S4, no murmur, click or rub, no peripheral edema and peripheral pulses strong and symmetric bilaterally   MS: No musculoskeletal defects are noted and gait is age appropriate without  ataxia   SKIN: No suspicious lesions or rashes, hydration status appears adeuqate with normal skin turgor   PSYCH: Alert and oriented x3; speech- coherent , normal rate and volume; able to articulate logical thoughts, able to abstract reason, no tangential thoughts, no hallucinations or delusions, mentation appears normal, Mood is euthymic. Affect is appropriate for this mood state and bright. Thought content is free of suicidal ideation, hallucinations, and delusions. Dress is adequate and upkept. Eye contact is good during conversation.       Diagnostic Test Results:  Labs reviewed in Epic        Assessment & Plan       ICD-10-CM    1. Chronic pain syndrome G89.4 oxyCODONE (ROXICODONE) 5 MG/5ML solution   2. Allergic conjunctivitis, bilateral H10.13 ondansetron (ZOFRAN-ODT) 8 MG ODT tab     azelastine (OPTIVAR) 0.05 % ophthalmic solution   3. Gastroesophageal reflux disease without esophagitis K21.9    4. Crohn's disease of colon with other complication (H) K50.118    5. Chronic bilateral low back pain with left-sided sciatica M54.42 oxyCODONE (ROXICODONE) 5 MG/5ML solution    G89.29    6. Seasonal allergic rhinitis due to pollen J30.1 ondansetron (ZOFRAN-ODT) 8 MG ODT tab     levocetirizine (XYZAL) 5 MG tablet   7. Migraine with aura and without status migrainosus, not intractable G43.109 ondansetron (ZOFRAN-ODT) ODT tab 8 mg        1. Pain  - Feeling better, on Fentanyl 25 mg patch, wishing to switch to liquid and taper down, did ask for morphine but this was declined, recommend Oxycodone       Fentanyl patch - MME 60      Will switch to liquid oxycodone          Will do total daily dose - 25 mg - MME 37.5 mg               Max five 5 mL doses/day    - Again, reviewed risks of oversedation, addiction, constipation   - Advised MUST make follow up with pain clinic and start physical therapy   - Must immediately discontinue Fentanyl and not use in combination with oxycodone   - See my previous notes regarding  patient's long spinal history and recent issues with tablet preparations of medications     2. GI Issues   - Patient with worsening nausea during visit, given 8 mg Zofran with improvement by end of visit of her symptoms   - Discussed worsening GI issues (known Crohn's and GERD) since sepsis has cleared, per our last note WBC was normalized so no further follow up needed   - Discussed WBC elevation doesn't always mean infection but could indicate inflammation of her bowels   - Again recommend she return to GI specialist for further management     - Reviewed all medications and refilled as needed     The patient indicates understanding of these issues and agrees with the plan.    Return in about 1 week (around 9/18/2019). for close taper medication follow up     Due to language barrier, an  was present during the history-taking and subsequent discussion (and for part of the physical exam) with this patient.      Aura Rosario PA-C  [facility]

## 2019-09-11 ENCOUNTER — OFFICE VISIT (OUTPATIENT)
Dept: FAMILY MEDICINE | Facility: OTHER | Age: 29
End: 2019-09-11
Payer: MEDICARE

## 2019-09-11 VITALS
OXYGEN SATURATION: 100 % | HEIGHT: 62 IN | WEIGHT: 179.2 LBS | HEART RATE: 90 BPM | RESPIRATION RATE: 20 BRPM | BODY MASS INDEX: 32.97 KG/M2 | TEMPERATURE: 97 F | SYSTOLIC BLOOD PRESSURE: 122 MMHG | DIASTOLIC BLOOD PRESSURE: 74 MMHG

## 2019-09-11 DIAGNOSIS — G89.4 CHRONIC PAIN SYNDROME: Primary | ICD-10-CM

## 2019-09-11 DIAGNOSIS — M54.42 CHRONIC BILATERAL LOW BACK PAIN WITH LEFT-SIDED SCIATICA: ICD-10-CM

## 2019-09-11 DIAGNOSIS — G89.29 CHRONIC BILATERAL LOW BACK PAIN WITH LEFT-SIDED SCIATICA: ICD-10-CM

## 2019-09-11 DIAGNOSIS — G43.109 MIGRAINE WITH AURA AND WITHOUT STATUS MIGRAINOSUS, NOT INTRACTABLE: ICD-10-CM

## 2019-09-11 DIAGNOSIS — K50.118 CROHN'S DISEASE OF COLON WITH OTHER COMPLICATION (H): ICD-10-CM

## 2019-09-11 DIAGNOSIS — J30.1 SEASONAL ALLERGIC RHINITIS DUE TO POLLEN: ICD-10-CM

## 2019-09-11 DIAGNOSIS — H10.13 ALLERGIC CONJUNCTIVITIS, BILATERAL: ICD-10-CM

## 2019-09-11 DIAGNOSIS — K21.9 GASTROESOPHAGEAL REFLUX DISEASE WITHOUT ESOPHAGITIS: ICD-10-CM

## 2019-09-11 PROCEDURE — 99214 OFFICE O/P EST MOD 30 MIN: CPT | Performed by: PHYSICIAN ASSISTANT

## 2019-09-11 RX ORDER — ONDANSETRON 8 MG/1
8 TABLET, ORALLY DISINTEGRATING ORAL EVERY 8 HOURS PRN
Qty: 45 TABLET | Refills: 3 | Status: SHIPPED | OUTPATIENT
Start: 2019-09-11 | End: 2020-01-02

## 2019-09-11 RX ORDER — AZELASTINE HYDROCHLORIDE 0.5 MG/ML
1 SOLUTION/ DROPS OPHTHALMIC 2 TIMES DAILY
Qty: 6 ML | Refills: 11 | Status: CANCELLED | OUTPATIENT
Start: 2019-09-11

## 2019-09-11 RX ORDER — LEVOCETIRIZINE DIHYDROCHLORIDE 5 MG/1
5 TABLET, FILM COATED ORAL EVERY EVENING
Qty: 90 TABLET | Refills: 3 | Status: CANCELLED | OUTPATIENT
Start: 2019-09-11

## 2019-09-11 RX ORDER — AZELASTINE HYDROCHLORIDE 0.5 MG/ML
1 SOLUTION/ DROPS OPHTHALMIC 2 TIMES DAILY
Qty: 6 ML | Refills: 11 | Status: SHIPPED | OUTPATIENT
Start: 2019-09-11 | End: 2022-02-09

## 2019-09-11 RX ORDER — OXYCODONE HCL 5 MG/5 ML
5-10 SOLUTION, ORAL ORAL EVERY 6 HOURS PRN
Qty: 175 ML | Refills: 0 | Status: SHIPPED | OUTPATIENT
Start: 2019-09-11 | End: 2019-09-18

## 2019-09-11 RX ORDER — LEVOCETIRIZINE DIHYDROCHLORIDE 5 MG/1
5 TABLET, FILM COATED ORAL EVERY EVENING
Qty: 90 TABLET | Refills: 3 | Status: SHIPPED | OUTPATIENT
Start: 2019-09-11 | End: 2019-12-18

## 2019-09-11 RX ORDER — RIZATRIPTAN BENZOATE 5 MG/1
5-10 TABLET, ORALLY DISINTEGRATING ORAL
Qty: 18 TABLET | Refills: 3 | Status: CANCELLED | OUTPATIENT
Start: 2019-09-11

## 2019-09-11 RX ORDER — ONDANSETRON 4 MG/1
8 TABLET, ORALLY DISINTEGRATING ORAL ONCE
Status: COMPLETED | OUTPATIENT
Start: 2019-09-11 | End: 2019-09-11

## 2019-09-11 RX ADMIN — ONDANSETRON 8 MG: 4 TABLET, ORALLY DISINTEGRATING ORAL at 11:04

## 2019-09-11 ASSESSMENT — PAIN SCALES - GENERAL: PAINLEVEL: MODERATE PAIN (4)

## 2019-09-11 ASSESSMENT — MIFFLIN-ST. JEOR: SCORE: 1486.1

## 2019-09-12 NOTE — PROGRESS NOTES
Subjective     Katy Islas is a 29 year old female who presents to clinic today for the following health issues:    HPI   Medication Followup of  Oxycodone Follow up for low back pain  -  Rates at a 5 currently    Taking Medication as prescribed: yes    Side Effects:  None    Medication Helping Symptoms:  Somewhat     - 5 doses per day, doing better than on Fentanyl patch      Wishes to go up to 6 doses per day      10 mg give more relief than 5 mg   - Robaxin - helps with leg cramps and pain   - Pain clinic and physical therapy next Tuesday 9/24/19            Hypertension Follow-up      Do you check your blood pressure regularly outside of the clinic? Yes     Are you following a low salt diet? Yes    Are your blood pressures ever more than 140 on the top number (systolic) OR more   than 90 on the bottom number (diastolic), for example 140/90? Yes    - Called with reading 153/112 heart rate 127    Anxious and in pain     Was up for a couple days but did come down             Plan from last visit 9/11/19  - Feeling better, on Fentanyl 25 mg patch, wishing to switch to liquid and taper down, did ask for morphine but this was declined, recommend Oxycodone       Fentanyl patch - MME 60      Will switch to liquid oxycodone          Will do total daily dose - 25 mg - MME 37.5 mg               Max five 5 mL doses/day    - Again, reviewed risks of oversedation, addiction, constipation   - Advised MUST make follow up with pain clinic and start physical therapy   - Must immediately discontinue Fentanyl and not use in combination with oxycodone   - See my previous notes regarding patient's long spinal history and recent issues with tablet preparations of medications     Review of Systems   ROS COMP: Constitutional, HEENT, cardiovascular, pulmonary, gi and gu systems are negative, except as otherwise noted.      Objective    /83   Pulse 98   Temp 98.5  F (36.9  C)   Resp 16   Wt 79.8 kg (176 lb)   SpO2 99%    BMI 32.19 kg/m    There is no height or weight on file to calculate BMI.  Physical Exam   GENERAL APPEARANCE: healthy, alert and no distress  EYES: Eyes grossly normal to inspection, PERRLA, conjunctivae and sclerae without injection or discharge, EOM intact   RESP: Lungs clear to auscultation - no rales, rhonchi or wheezes    CV: Regular rates and rhythm, normal S1 S2, no S3 or S4, no murmur, click or rub, no peripheral edema and peripheral pulses strong and symmetric bilaterally   MS: No musculoskeletal defects are noted and gait is age appropriate without ataxia   SKIN: No suspicious lesions or rashes, hydration status appears adeuqate with normal skin turgor   PSYCH: Alert and oriented x3; speech- coherent , normal rate and volume; able to articulate logical thoughts, able to abstract reason, no tangential thoughts, no hallucinations or delusions, mentation appears normal, Mood is euthymic. Affect is appropriate for this mood state and bright. Thought content is free of suicidal ideation, hallucinations, and delusions. Dress is adequate and upkept. Eye contact is good during conversation.       Diagnostic Test Results:  Labs reviewed in Epic  none         Assessment & Plan       ICD-10-CM    1. Chronic pain syndrome G89.4 methocarbamol (ROBAXIN) 750 MG tablet     oxyCODONE (ROXICODONE) 5 MG/5ML solution   2. Chronic bilateral low back pain with left-sided sciatica M54.42 oxyCODONE (ROXICODONE) 5 MG/5ML solution    G89.29    3. Hypertension goal BP (blood pressure) < 130/80 I10 metoprolol succinate ER (TOPROL-XL) 200 MG 24 hr tablet     1 & 2. Pain  - See my previous notes regarding patient's long spinal history and recent issues with tablet preparations of medications   - Successfully tapered off  Fentanyl 25 mg patch, MME 60  - Switched to liquid oxycodone          Will do total daily dose - 25 mg - MME 37.5 mg               Max five 5 mL doses/day   - Again, reviewed risks of oversedation, addiction,  constipation, no driving on this medication    - She is asking for increase by 1 dose  as needs to take 10 mg for any relief (to 10 mg AM 10 mg lunch and 10 mg before bed)   - Did make follow up with pain clinic and starts physical therapy next Tuesday, Sept 24th   - Will allow this increase as I did dramatically drop her MME from 60 to 37.5       Oxycodone 5-10 ml (5-10 mg) doses, 6/day, total 30 mg, MME 45  - Recheck every 2 weeks   - Will need visits from above confirmed at next visit as well     2. HTN  - Patient reporting elevated BPs and pulses for about 4-5 days at home   - Normal BP today but pulses still elevated   - Could be due to pain and anxiety   - Continues on Losartan 100 mg   - Increase Metoprolol to 200 mg (2 of what you have at home, then new prescription will have 200 mg in 1 tablet)       Monitor blood pressure 2x/day       Use [employer] to send me your Blood Pressures/Pulse log in 1 week       If still elevated, may need to consider adding different class of medication like hydrochlorathiazide or Amlodipine       The patient indicates understanding of these issues and agrees with the plan.    Return in about 2 weeks (around 10/2/2019).    Aura Rosario PA-C  [facility]

## 2019-09-14 ENCOUNTER — TRANSFERRED RECORDS (OUTPATIENT)
Dept: HEALTH INFORMATION MANAGEMENT | Facility: CLINIC | Age: 29
End: 2019-09-14

## 2019-09-14 ENCOUNTER — NURSE TRIAGE (OUTPATIENT)
Dept: NURSING | Facility: CLINIC | Age: 29
End: 2019-09-14

## 2019-09-14 LAB
CREAT SERPL-MCNC: 0.88 MG/DL (ref 0.55–1.02)
GFR SERPL CREATININE-BSD FRML MDRD: >60 ML/MIN
GLUCOSE SERPL-MCNC: 84 MG/DL (ref 70–110)
POTASSIUM SERPL-SCNC: 4.4 MMOL/L (ref 3.5–5.1)

## 2019-09-14 NOTE — TELEPHONE ENCOUNTER
Call from patient via ASL.  Patient reports she was in the hospital 1 week ago for sepsis and finished the antibiotics on Monday.  Patient says her fever is back at 101.1 F (oral).  Patient also is sweaty, sticky, heart rate is 132 , /91, tired with a headache and neck pain.  Patient reports she just took Tylenol prior to call.  Reviewed care advice with caller to be evaluated in ED now.  Caller verbalizes understanding.      Reason for Disposition    [1] Headache AND [2] stiff neck (can't touch chin to chest)    Additional Information    Negative: Shock suspected (e.g., cold/pale/clammy skin, too weak to stand, low BP, rapid pulse)    Negative: Difficult to awaken or acting confused (e.g., disoriented, slurred speech)    Negative: [1] Difficulty breathing AND [2] bluish lips, tongue or face    Negative: New onset rash with multiple purple (or blood-colored) spots or dots    Negative: Sounds like a life-threatening emergency to the triager    Negative: Fever in a cancer patient who is currently (or recently) receiving chemotherapy or radiation therapy, or cancer patient who has metastatic or end-stage cancer and is receiving palliative care    Negative: Pregnant    Negative: Fever onset within 24 hours of receiving vaccine    Negative: [1] Fever AND [2] within 21 days of Ebola EXPOSURE    Negative: Other symptom is present, see that guideline  (e.g., symptoms of cough, runny nose, sore throat, earache, abdominal pain, diarrhea, vomiting)    Protocols used: FEVER-A-AH

## 2019-09-16 ENCOUNTER — TELEPHONE (OUTPATIENT)
Dept: FAMILY MEDICINE | Facility: OTHER | Age: 29
End: 2019-09-16

## 2019-09-16 NOTE — TELEPHONE ENCOUNTER
Katy Islas is a 29 year old female who calls with HTN.    NURSING ASSESSMENT:  Description:  B/P 153/112 127 155/116, Taking metoprolol and losartan. Yesterday B/P 157/91. No fever, feeling better since ED. No HA, Not dizzy. Stopped fentanyl patch Last Wednesday. Pt feels her home blood pressure machine is accurate.    BP Readings from Last 3 Encounters:   09/11/19 122/74   09/04/19 110/72   08/26/19 98/60     Next 5 appointments (look out 90 days)    Sep 18, 2019 10:00 AM CDT  Office Visit with Aura Rosario PA-C, [facility] [address]  [facility] [id number] [phone]        Onset/duration:  today  Pain scale (0-10)   6/10, lower back.     Allergies:   Allergies   Allergen Reactions     Iohexol Hives     Other reaction(s): Hives  IV contrast; patient states she tolerates contrast if she gets benadryl before  IV contrast; patient states she tolerates contrast if she gets benadryl before     Ativan [Lorazepam] Hives     At the IV site     Baclofen      hives     Bees Hives, Swelling and Difficulty breathing     Caffeine      Contrast Dye      Hives,   Updated 5/10/2016 CT Contrast.     Iodine Hives     Methocarbamol Swelling     Metoclopramide      Other reaction(s): Tremors  LW Reaction: shaking/sweating     Midazolam      Other reaction(s): Agitation     Monosodium Glutamate      Morphine Other (See Comments)     Difficulty with urination     Nsaids Other (See Comments)     GI BLEED x2     Other (Do Not Use) Other (See Comments)     Xanaflex- pt becomes disoriented and loses bladder control     Reglan [Metoclopramide Hcl] Other (See Comments)     shaking     Soma [Carisoprodol] Visual Disturbance     Sleep walking     Tizanidine      Topamax Other (See Comments)     Topamax [Topiramate] Nausea     Tingling  GI/Vomit     Tramadol      Severe Headache, Seizure Risk     Tylenol W/Codeine [Acetaminophen-Codeine] Nausea  and Itching     Tylenol 3     Valium Other (See Comments)     Becomes aggressive and angry     Versed Other (See Comments)     Zolmitriptan      Makes face feel like its twitching     Droperidol Anxiety     Flu Virus Vaccine Rash      Arm swelling       NURSING PLAN: Huddle with provider, plan includes Continue to monitor and report back.     RECOMMENDED DISPOSITION:  Home care advice   Will comply with recommendation: Yes  If further questions/concerns or if symptoms do not improve, worsen or new symptoms develop, call your PCP or [facility] as soon as possible.      Guideline used: HTN  Telephone Triage Protocols for Nurses, Fifth Edition, Telma Jimenez, RN, RN

## 2019-09-17 ENCOUNTER — MYC MEDICAL ADVICE (OUTPATIENT)
Dept: FAMILY MEDICINE | Facility: OTHER | Age: 29
End: 2019-09-17

## 2019-09-17 NOTE — TELEPHONE ENCOUNTER
Patient has appointment tomorrow. Can discuss then.     Zach Rosario PA-C  [facility]

## 2019-09-18 ENCOUNTER — OFFICE VISIT (OUTPATIENT)
Dept: FAMILY MEDICINE | Facility: OTHER | Age: 29
End: 2019-09-18
Payer: MEDICARE

## 2019-09-18 ENCOUNTER — TELEPHONE (OUTPATIENT)
Dept: FAMILY MEDICINE | Facility: OTHER | Age: 29
End: 2019-09-18

## 2019-09-18 VITALS
HEART RATE: 102 BPM | WEIGHT: 176 LBS | OXYGEN SATURATION: 99 % | TEMPERATURE: 98.5 F | DIASTOLIC BLOOD PRESSURE: 83 MMHG | BODY MASS INDEX: 32.19 KG/M2 | RESPIRATION RATE: 16 BRPM | SYSTOLIC BLOOD PRESSURE: 116 MMHG

## 2019-09-18 DIAGNOSIS — M54.42 CHRONIC BILATERAL LOW BACK PAIN WITH LEFT-SIDED SCIATICA: ICD-10-CM

## 2019-09-18 DIAGNOSIS — I10 HYPERTENSION GOAL BP (BLOOD PRESSURE) < 130/80: ICD-10-CM

## 2019-09-18 DIAGNOSIS — G89.29 CHRONIC BILATERAL LOW BACK PAIN WITH LEFT-SIDED SCIATICA: ICD-10-CM

## 2019-09-18 DIAGNOSIS — G89.4 CHRONIC PAIN SYNDROME: Primary | ICD-10-CM

## 2019-09-18 PROCEDURE — 99214 OFFICE O/P EST MOD 30 MIN: CPT | Performed by: PHYSICIAN ASSISTANT

## 2019-09-18 RX ORDER — OXYCODONE HCL 5 MG/5 ML
SOLUTION, ORAL ORAL
Refills: 0 | COMMUNITY
Start: 2019-09-11 | End: 2019-09-18

## 2019-09-18 RX ORDER — OXYCODONE HCL 5 MG/5 ML
5-10 SOLUTION, ORAL ORAL EVERY 6 HOURS PRN
Qty: 420 ML | Refills: 0 | Status: SHIPPED | OUTPATIENT
Start: 2019-09-18 | End: 2019-10-01

## 2019-09-18 RX ORDER — METHOCARBAMOL 750 MG/1
750 TABLET, FILM COATED ORAL 3 TIMES DAILY PRN
Qty: 60 TABLET | Refills: 3 | Status: SHIPPED | OUTPATIENT
Start: 2019-09-18 | End: 2019-12-03

## 2019-09-18 RX ORDER — METOPROLOL SUCCINATE 200 MG/1
200 TABLET, EXTENDED RELEASE ORAL DAILY
Qty: 90 TABLET | Refills: 1 | Status: SHIPPED | OUTPATIENT
Start: 2019-09-18 | End: 2020-06-24

## 2019-09-18 ASSESSMENT — PAIN SCALES - GENERAL: PAINLEVEL: MODERATE PAIN (5)

## 2019-09-18 NOTE — TELEPHONE ENCOUNTER
Prior Authorization Retail Medication Request    Medication/Dose: methocarbamol (ROBAXIN) 750 MG tablet  ICD code (if different than what is on RX):    Previously Tried and Failed:    Rationale:      Insurance Name:    Insurance ID:        Pharmacy Information (if different than what is on RX)  Name:  [facility]  Phone:  [phone]

## 2019-09-18 NOTE — PATIENT INSTRUCTIONS
1. Blood pressure       Increase Metoprolol to 200 mg (2 of what you have at home, then new prescription will have 200 mg in 1 tablet)       Monitor blood pressure 2x/day       Use [email or web address] to send me your Blood Pressures/Pulse log in 1 week     2. Increase to six doses of 5 ml of oxycodone     3. Recheck 2 weeks

## 2019-09-19 ENCOUNTER — MYC MEDICAL ADVICE (OUTPATIENT)
Dept: FAMILY MEDICINE | Facility: OTHER | Age: 29
End: 2019-09-19

## 2019-09-23 NOTE — TELEPHONE ENCOUNTER
Central Prior Authorization Team   Phone: [phone]      PA Initiation    Medication: methocarbamol (ROBAXIN) 750 MG tablet-Initiated  Insurance Company: Medicare Blue - Phone [phone] Fax [phone]  Pharmacy Filling the Rx: [id number] [place], [place] - [address]  Filling Pharmacy Phone: [phone]  Filling Pharmacy Fax:    Start Date: 9/23/2019

## 2019-09-23 NOTE — TELEPHONE ENCOUNTER
Prior Authorization Approval    Authorization Effective Date: 6/25/2019  Authorization Expiration Date: 9/22/2020  Medication: methocarbamol (ROBAXIN) 750 MG tablet-APPROVED  Approved Dose/Quantity:   Reference #:     Insurance Company: Medicare Blue - Phone [phone] Fax [phone]  Expected CoPay:       CoPay Card Available:      Foundation Assistance Needed:    Which Pharmacy is filling the prescription (Not needed for infusion/clinic administered): [id number] - [place], [place] - [address] [place] [address] [address]  Pharmacy Notified: Yes  Patient Notified: No    Pharmacy will notify patient when medication is ready.

## 2019-09-25 ENCOUNTER — NURSE TRIAGE (OUTPATIENT)
Dept: NURSING | Facility: CLINIC | Age: 29
End: 2019-09-25

## 2019-09-26 NOTE — TELEPHONE ENCOUNTER
Caller is reporting ( through a TTD ) ;  She has felt dizzy all day and tonight has mild chest pain  With BP readings of  175/117 137  Triage protocl reviewed   Advised patient to be seen in ED tonight   Caller states luigi have friend drive her to  [facility] ED   Lashell Gonzalez RN  [facility]    Reason for Disposition    [1] Systolic BP  >= 160 OR Diastolic >= 100 AND [2] cardiac or neurologic symptoms (e.g., chest pain, difficulty breathing, unsteady gait, blurred vision)    Additional Information    Negative: Difficult to awaken or acting confused (e.g., disoriented, slurred speech)    Negative: Severe difficulty breathing (e.g., struggling for each breath, speaks in single words)    Negative: [1] Weakness of the face, arm or leg on one side of the body AND [2] new onset    Negative: [1] Numbness (i.e., loss of sensation) of the face, arm or leg on one side of the body AND [2] new onset    Negative: [1] Chest pain lasts > 5 minutes AND [2] history of heart disease  (i.e., heart attack, bypass surgery, angina, angioplasty, CHF)    Negative: [1] Chest pain AND [2] took nitrogylcerin AND [3] pain was not relieved    Negative: Sounds like a life-threatening emergency to the triager    Protocols used: HIGH BLOOD PRESSURE-A-AH

## 2019-09-28 ENCOUNTER — HEALTH MAINTENANCE LETTER (OUTPATIENT)
Age: 29
End: 2019-09-28

## 2019-09-30 ENCOUNTER — TELEPHONE (OUTPATIENT)
Dept: GASTROENTEROLOGY | Facility: CLINIC | Age: 29
End: 2019-09-30

## 2019-09-30 ENCOUNTER — TELEPHONE (OUTPATIENT)
Dept: FAMILY MEDICINE | Facility: OTHER | Age: 29
End: 2019-09-30

## 2019-09-30 ENCOUNTER — TRANSFERRED RECORDS (OUTPATIENT)
Dept: HEALTH INFORMATION MANAGEMENT | Facility: CLINIC | Age: 29
End: 2019-09-30

## 2019-09-30 NOTE — TELEPHONE ENCOUNTER
Reason for call:  Symptom  Reason for call:  Patient reporting a symptom    Symptom or request: coughing, fever    Duration (how long have symptoms been present): last yesterday     Have you been treated for this before? Yes    Additional comments: pt was sent to triage for her cough. She is coughing so much and hard that its making her cough up blood. She is also running a fever.     Phone Number patient can be reached at:  Cell number on file:    Telephone Information:   Mobile [phone]       Best Time:  anytime    Can we leave a detailed message on this number:  YES    Call taken on 9/30/2019 at 10:21 AM by Sheryl Ruby

## 2019-09-30 NOTE — TELEPHONE ENCOUNTER
Spoke to pt through . States that she has bright red blood in her vomit and stool and is unable to keep liquids down. Would like to see Junaid Pritchett PA-C tomorrow. States that ER visit was not helpful. Advised for her to take small sips of water in order to attempt to keep liquids down. Scheduled pt to see Junaid tomorrow at 3pm.     Yarelis Abbasi RN  Gastroenterology Care Coordinator  [place], [place]

## 2019-09-30 NOTE — TELEPHONE ENCOUNTER
I spoke with patient via .   She has been vomiting for two days - was seen in the ED on 9/28.  She also has a cough, which causes her to gag, causing her to vomit.   There has been a small amount of blood noted.   She is experiencing stomach pain.   Temperature is 100.2.   She has not reached out to GI as she didn't know their phone number.   Recommended follow up with GI.   Patient agrees.     Ines Roy, RN, BSN

## 2019-10-01 ENCOUNTER — OFFICE VISIT (OUTPATIENT)
Dept: FAMILY MEDICINE | Facility: OTHER | Age: 29
End: 2019-10-01
Payer: COMMERCIAL

## 2019-10-01 ENCOUNTER — ANCILLARY PROCEDURE (OUTPATIENT)
Dept: GENERAL RADIOLOGY | Facility: OTHER | Age: 29
End: 2019-10-01
Attending: PHYSICIAN ASSISTANT
Payer: MEDICARE

## 2019-10-01 ENCOUNTER — MYC MEDICAL ADVICE (OUTPATIENT)
Dept: FAMILY MEDICINE | Facility: OTHER | Age: 29
End: 2019-10-01

## 2019-10-01 ENCOUNTER — TELEPHONE (OUTPATIENT)
Dept: FAMILY MEDICINE | Facility: OTHER | Age: 29
End: 2019-10-01

## 2019-10-01 VITALS
OXYGEN SATURATION: 99 % | HEIGHT: 62 IN | SYSTOLIC BLOOD PRESSURE: 118 MMHG | DIASTOLIC BLOOD PRESSURE: 84 MMHG | RESPIRATION RATE: 16 BRPM | BODY MASS INDEX: 32.9 KG/M2 | WEIGHT: 178.8 LBS | TEMPERATURE: 99 F | HEART RATE: 94 BPM
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
## 2019-10-01 DIAGNOSIS — J45.30 MILD PERSISTENT ASTHMA WITHOUT COMPLICATION: ICD-10-CM

## 2019-10-01 DIAGNOSIS — M54.42 CHRONIC BILATERAL LOW BACK PAIN WITH LEFT-SIDED SCIATICA: ICD-10-CM

## 2019-10-01 DIAGNOSIS — G89.29 CHRONIC BILATERAL LOW BACK PAIN WITH LEFT-SIDED SCIATICA: ICD-10-CM

## 2019-10-01 DIAGNOSIS — S20.211A RIB CONTUSION, RIGHT, INITIAL ENCOUNTER: ICD-10-CM

## 2019-10-01 DIAGNOSIS — K21.9 GASTROESOPHAGEAL REFLUX DISEASE WITHOUT ESOPHAGITIS: ICD-10-CM

## 2019-10-01 DIAGNOSIS — G89.4 CHRONIC PAIN SYNDROME: ICD-10-CM

## 2019-10-01 PROCEDURE — 99214 OFFICE O/P EST MOD 30 MIN: CPT | Performed by: PHYSICIAN ASSISTANT

## 2019-10-01 PROCEDURE — 71101 X-RAY EXAM UNILAT RIBS/CHEST: CPT | Mod: RT

## 2019-10-01 RX ORDER — ALBUTEROL SULFATE 0.83 MG/ML
2.5 SOLUTION RESPIRATORY (INHALATION) EVERY 6 HOURS PRN
Qty: 50 VIAL | Refills: 11 | Status: CANCELLED | OUTPATIENT
Start: 2019-10-01

## 2019-10-01 RX ORDER — ALBUTEROL SULFATE 0.83 MG/ML
2.5 SOLUTION RESPIRATORY (INHALATION) EVERY 6 HOURS PRN
Qty: 50 VIAL | Refills: 11 | Status: SHIPPED | OUTPATIENT
Start: 2019-10-01 | End: 2020-04-23

## 2019-10-01 RX ORDER — OXYCODONE HCL 5 MG/5 ML
5-10 SOLUTION, ORAL ORAL EVERY 6 HOURS PRN
Qty: 420 ML | Refills: 0 | Status: CANCELLED | OUTPATIENT
Start: 2019-10-01

## 2019-10-01 RX ORDER — ALBUTEROL SULFATE 90 UG/1
2 AEROSOL, METERED RESPIRATORY (INHALATION) EVERY 6 HOURS PRN
Qty: 3 INHALER | Refills: 3 | Status: CANCELLED | OUTPATIENT
Start: 2019-10-01

## 2019-10-01 RX ORDER — LIDOCAINE HYDROCHLORIDE 20 MG/ML
15 SOLUTION OROPHARYNGEAL EVERY 4 HOURS PRN
Qty: 100 ML | Refills: 3 | Status: SHIPPED | OUTPATIENT
Start: 2019-10-01 | End: 2019-12-03

## 2019-10-01 RX ORDER — ALUMINA, MAGNESIA, AND SIMETHICONE 2400; 2400; 240 MG/30ML; MG/30ML; MG/30ML
15 SUSPENSION ORAL EVERY 4 HOURS PRN
Qty: 355 ML | Refills: 11 | Status: SHIPPED | OUTPATIENT
Start: 2019-10-01 | End: 2019-12-03

## 2019-10-01 RX ORDER — OXYCODONE HCL 5 MG/5 ML
5-10 SOLUTION, ORAL ORAL EVERY 6 HOURS PRN
Qty: 420 ML | Refills: 0 | Status: SHIPPED | OUTPATIENT
Start: 2019-10-02 | End: 2019-10-15

## 2019-10-01 RX ORDER — DULOXETIN HYDROCHLORIDE 60 MG/1
60 CAPSULE, DELAYED RELEASE ORAL DAILY
Qty: 90 CAPSULE | Refills: 3 | Status: CANCELLED | OUTPATIENT
Start: 2019-10-01

## 2019-10-01 RX ORDER — ALBUTEROL SULFATE 90 UG/1
2 AEROSOL, METERED RESPIRATORY (INHALATION) EVERY 6 HOURS PRN
Qty: 3 INHALER | Refills: 3 | Status: SHIPPED | OUTPATIENT
Start: 2019-10-01 | End: 2020-04-23

## 2019-10-01 RX ORDER — GABAPENTIN 300 MG/1
300-900 CAPSULE ORAL 3 TIMES DAILY
Qty: 270 CAPSULE | Refills: 5 | Status: CANCELLED | OUTPATIENT
Start: 2019-10-01

## 2019-10-01 ASSESSMENT — MIFFLIN-ST. JEOR: SCORE: 1484.28

## 2019-10-01 ASSESSMENT — PAIN SCALES - GENERAL: PAINLEVEL: MODERATE PAIN (4)
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
## 2019-10-01 NOTE — PATIENT INSTRUCTIONS
- Ice painful and bruised areas in 15-20 minute intervals     - Continue Tylenol     - Every few hours remind yourself to take 3-4 slow deep breaths - counting 15 seconds in and 15 seconds out     - 2 puffs or 1 nebulizer three times a day for 1 week, then as needed

## 2019-10-01 NOTE — TELEPHONE ENCOUNTER
Responded via [employer]    Next 5 appointments (look out 90 days)    Oct 01, 2019  1:00 PM CDT  Office Visit with Aura Rosario PA-C  [facility] [address] [place] [id number]  [phone]   Oct 01, 2019  3:00 PM CDT  Return Visit with Junaid Pritchett PA-C, [facility] ([facility]) [address] [place] [id number]  [phone]   Oct 02, 2019 10:00 AM CDT  Office Visit with Aura Rosario PA-C, Dayana Roldan  [facility] ([facility] [address] [place] [phone]  [phone]          Claudine Jimenez, RN, BSN
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
## 2019-10-07 ENCOUNTER — TELEPHONE (OUTPATIENT)
Dept: FAMILY MEDICINE | Facility: OTHER | Age: 29
End: 2019-10-07

## 2019-10-07 NOTE — TELEPHONE ENCOUNTER
Reason for Call:  Form, our goal is to have forms completed with 72 hours, however, some forms may require a visit or additional information.    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

## 2019-10-15 ENCOUNTER — OFFICE VISIT (OUTPATIENT)
Dept: FAMILY MEDICINE | Facility: OTHER | Age: 29
End: 2019-10-15
Payer: MEDICARE

## 2019-10-15 VITALS
HEIGHT: 62 IN | DIASTOLIC BLOOD PRESSURE: 80 MMHG | SYSTOLIC BLOOD PRESSURE: 114 MMHG | HEART RATE: 90 BPM | BODY MASS INDEX: 32.31 KG/M2 | WEIGHT: 175.6 LBS | TEMPERATURE: 97.7 F | OXYGEN SATURATION: 99 % | RESPIRATION RATE: 14 BRPM

## 2019-10-15 DIAGNOSIS — G89.4 CHRONIC PAIN SYNDROME: ICD-10-CM

## 2019-10-15 DIAGNOSIS — F43.23 ADJUSTMENT DISORDER WITH MIXED ANXIETY AND DEPRESSED MOOD: ICD-10-CM

## 2019-10-15 DIAGNOSIS — Z23 NEED FOR PROPHYLACTIC VACCINATION AND INOCULATION AGAINST INFLUENZA: ICD-10-CM

## 2019-10-15 DIAGNOSIS — Z30.42 ENCOUNTER FOR SURVEILLANCE OF INJECTABLE CONTRACEPTIVE: ICD-10-CM

## 2019-10-15 DIAGNOSIS — G89.29 CHRONIC BILATERAL LOW BACK PAIN WITH LEFT-SIDED SCIATICA: ICD-10-CM

## 2019-10-15 DIAGNOSIS — F32.0 MILD MAJOR DEPRESSION (H): Primary | ICD-10-CM

## 2019-10-15 DIAGNOSIS — I10 HYPERTENSION GOAL BP (BLOOD PRESSURE) < 130/80: ICD-10-CM

## 2019-10-15 DIAGNOSIS — F31.63 BIPOLAR DISORDER, CURRENT EPISODE MIXED, SEVERE, WITHOUT PSYCHOTIC FEATURES (H): ICD-10-CM

## 2019-10-15 DIAGNOSIS — Z23 NEED FOR VACCINATION AGAINST STREPTOCOCCUS PNEUMONIAE: ICD-10-CM

## 2019-10-15 DIAGNOSIS — M54.42 CHRONIC BILATERAL LOW BACK PAIN WITH LEFT-SIDED SCIATICA: ICD-10-CM

## 2019-10-15 PROCEDURE — G0008 ADMIN INFLUENZA VIRUS VAC: HCPCS | Performed by: PHYSICIAN ASSISTANT

## 2019-10-15 PROCEDURE — 90686 IIV4 VACC NO PRSV 0.5 ML IM: CPT | Performed by: PHYSICIAN ASSISTANT

## 2019-10-15 PROCEDURE — G0009 ADMIN PNEUMOCOCCAL VACCINE: HCPCS | Performed by: PHYSICIAN ASSISTANT

## 2019-10-15 PROCEDURE — 99214 OFFICE O/P EST MOD 30 MIN: CPT | Mod: 25 | Performed by: PHYSICIAN ASSISTANT

## 2019-10-15 PROCEDURE — T1013 SIGN LANG/ORAL INTERPRETER: HCPCS | Mod: U3 | Performed by: PHYSICIAN ASSISTANT

## 2019-10-15 PROCEDURE — 90732 PPSV23 VACC 2 YRS+ SUBQ/IM: CPT | Performed by: PHYSICIAN ASSISTANT

## 2019-10-15 RX ORDER — MEDROXYPROGESTERONE ACETATE 150 MG/ML
150 INJECTION, SUSPENSION INTRAMUSCULAR
Status: ACTIVE | OUTPATIENT
Start: 2019-10-15 | End: 2021-01-07

## 2019-10-15 RX ORDER — OXYCODONE HCL 5 MG/5 ML
5-10 SOLUTION, ORAL ORAL EVERY 6 HOURS PRN
Qty: 420 ML | Refills: 0 | Status: SHIPPED | OUTPATIENT
Start: 2019-10-29 | End: 2019-11-06

## 2019-10-15 RX ORDER — OXYCODONE HCL 5 MG/5 ML
5-10 SOLUTION, ORAL ORAL EVERY 6 HOURS PRN
Qty: 420 ML | Refills: 0 | Status: SHIPPED | OUTPATIENT
Start: 2019-11-14 | End: 2019-10-15

## 2019-10-15 RX ORDER — OXYCODONE HCL 5 MG/5 ML
5-10 SOLUTION, ORAL ORAL EVERY 6 HOURS PRN
Qty: 420 ML | Refills: 0 | Status: SHIPPED | OUTPATIENT
Start: 2019-10-15 | End: 2019-10-15

## 2019-10-15 ASSESSMENT — ANXIETY QUESTIONNAIRES
5. BEING SO RESTLESS THAT IT IS HARD TO SIT STILL: NEARLY EVERY DAY
2. NOT BEING ABLE TO STOP OR CONTROL WORRYING: SEVERAL DAYS
3. WORRYING TOO MUCH ABOUT DIFFERENT THINGS: SEVERAL DAYS
6. BECOMING EASILY ANNOYED OR IRRITABLE: SEVERAL DAYS
GAD7 TOTAL SCORE: 10
GAD7 TOTAL SCORE: 10
7. FEELING AFRAID AS IF SOMETHING AWFUL MIGHT HAPPEN: NOT AT ALL
4. TROUBLE RELAXING: NEARLY EVERY DAY
1. FEELING NERVOUS, ANXIOUS, OR ON EDGE: SEVERAL DAYS
GAD7 TOTAL SCORE: 10
7. FEELING AFRAID AS IF SOMETHING AWFUL MIGHT HAPPEN: NOT AT ALL

## 2019-10-15 ASSESSMENT — PAIN SCALES - GENERAL: PAINLEVEL: MODERATE PAIN (4)

## 2019-10-15 ASSESSMENT — MIFFLIN-ST. JEOR: SCORE: 1469.77

## 2019-10-15 NOTE — LETTER
[facility]  10/15/19    Patient: Katy Islas  YOB: 1990  Medical Record Number: [id number]                                                                  Opioid / Opioid Plus Controlled Substance Agreement    I understand that my care provider has prescribed an opioid (narcotic) controlled substance to help manage my condition(s). I am taking this medicine to help me function or work. I know this is strong medicine, and that it can cause serious side effects. Opioid medicine can be sedating, addicting and may cause a dependency on the drug. They can affect my ability to drive or think, and cause depression. They need to be taken exactly as prescribed. Combining opioids with certain medicines or chemicals (such as cocaine, sedatives and tranquilizers, sleeping pills, meth) can be dangerous or even fatal. Also, if I stop opioids suddenly, I may have severe withdrawal symptoms. Last, I understand that opioids do not work for all types of pain nor for all patients. If not helpful, I may be asked to stop them.    The risks, benefits, and side effects of these medicine(s) were explained to me. I agree that:    1. I will take part in other treatments as advised by my care team. This may be psychiatry or counseling, physical therapy, behavioral therapy, group treatment or a referral to a pain clinic. I will reduce or stop my medicine when my care team tells me to do so.  2. I will take my medicines as prescribed. I will not change the dose or schedule unless my care team tells me to. There will be no refills if I  run out early.   I may be contactedwithout warning and asked to complete a urine drug test or pill count at any time.   3. I will keep all my appointments, and understand this is part of the monitoring of opioids. My care team may require an office visit for EVERY opioid/controlled substance refill. If I miss appointments or don t follow instructions, my care team may stop my  medicine.  4. I will not ask other providers to prescribe controlled substances, and I will not accept controlled substances from other people. If I need another prescribed controlled substance for a new reason, I will tell my care team within 1 business day.  5. I will use one pharmacy to fill all of my controlled substance prescriptions, and it is up to me to make sure that I do not run out of my medicines on weekends or holidays. If my care team is willing to refill my opioid prescription without a visit, I must request refills only during office hours, refills may take up to 3 days to process, and it may take up to 5 to 7 days for my medicine to be mailed and ready at my pharmacy. Prescriptions will not be mailed anywhere except my pharmacy.        [id number]  Rev 12/18         Registration to scan to EHR                             Page 1 of 2               Controlled Substance Agreement Opioid        [facility]  10/15/19  Patient: Katy Islas  YOB: 1990  Medical Record Number: [id number]                                                                  6. I am responsible for my prescriptions. If the medicine/prescription is lost or stolen, it will not be replaced. I also agree not to share controlled substance medicines with anyone.  7. I agree to not use ANY illegal or recreational drugs. This includes marijuana, cocaine, bath salts or other drugs. I agree not to use alcohol unless my care team says I may.          I agree to give urine samples whenever asked. If I don t give a urine sample, the care team may stop my medicine.    8. If I enroll in the [place] Medical Marijuana program, I will tell my care team. I will also sign an agreement to share my medical records with my care team.   9. I will bring in my list of medicines (or my medicine bottles) each time I come to the clinic.   10. I will tell my care team right away if I become pregnant or have a new medical problem  treated outside of my regular clinic.  11. I understand that this medicine can affect my thinking and judgment. It may be unsafe for me to drive, use machinery and do dangerous tasks. I will not do any of these things until I know how the medicine affects me. If my dose changes, I will wait to see how it affects me. I will contact my care team if I have concerns about medicine side effects.    I understand that if I do not follow any of the conditions above, my prescriptions or treatment may be stopped.      I agree that my provider, clinic care team, and pharmacy may work with any city, state or federal law enforcement agency that investigates the misuse, sale, or other diversion of my controlled medicine. I will allow my provider to discuss my care with or share a copy of this agreement with any other treating provider, pharmacy or emergency room where I receive care. I agree to give up (waive) any right of privacy or confidentiality with respect to these consents.     I have read this agreement and have asked questions about anything I did not understand.      ________________________________________________________________________  Patient signature - Date/Time -  Katy Islas                                    ________________________________________________________________________  Provider signature - Aura Rosario PA-C                                      [id number]  Rev 12/18         Registration to scan to EHR                         Page 2 of 2                   Controlled Substance Agreement Opioid             Opioid Pain Medicines (also known as Narcotics)  What You Need to Know    What are opioids?   Opioids are pain medicines that must be prescribed by a doctor.  They are also known as narcotics.    Examples are:     morphine (MS Contin, Amie)    oxycodone (Oxycontin)    oxycodone and acetaminophen (Percocet)    hydrocodone and acetaminophen (Vicodin, Norco)     fentanyl patch  (Duragesic)     hydromorphone (Dilaudid)     methadone     What do opioids do well?   Opioids are best for short-term pain after a surgery or injury. They also work well for cancer pain. Unlike other pain medicines, they do not cause liver or kidney failure or ulcers. They may help some people with long-lasting (chronic) pain.     What do opioids NOT do well?   Opioids never get rid of pain entirely, and they do not work well for most patients with chronic pain. Opioids do not reduce swelling, one of the causes of pain. They also don t work well for nerve pain.                           For informational purposes only.  Not to replace the advice of your care provider.  Copyright [address] [facility]. All right reserved. [employer] [id number] 02/18.      Page 2 of 2    Risks and side effects   Talk to your doctor before you start or decide to keep taking one of these medicines. Side effects include:    Lowering your breathing rate enough to cause death    Overdose, including death, especially if taking higher than prescribed doses    Long-term opioid use    Worse depression symptoms; less pleasure in things you usually enjoy    Feeling tired or sluggish    Slower thoughts or cloudy thinking    Being more sensitive to pain over time; pain is harder to control    Trouble sleeping or restless sleep    Changes in hormone levels (for example, less testosterone)    Changes in sex drive or ability to have sex    Constipation    Unsafe driving    Itching and sweating    Feeling dizzy    Nausea, vomiting and dry mouth    What else should I know about opioids?  When someone takes opioids for too long or too often, they become dependent. This means that if you stop or reduce the medicine too quickly, you will have withdrawal symptoms.    Dependence is not the same as addiction. Addiction is when people keep using a substance that harms their body, their mind or their relations with others. If you have a history of drug  or alcohol abuse, taking opioids can cause a relapse.    Over time, opioids don t work as well. Most people will need higher and higher doses. The higher the dose, the more serious the side effects. We don t know the long-term effects of opioids.      Prescribed opioids aren't the best way to manage chronic pain    Other ways to manage pain include:      Ibuprofen or acetaminophen.  You should always try this first.      Treat health problems that may be causing pain.      acupuncture or massage, deep breathing, meditation, visual imagery, aromatherapy.      Use heat or ice at the pain site      Physical therapy and exercise      Stop smoking      See a counselor or therapist                                                  People who have used opioids for a long time may have a lower quality of life, worse depression, higher levels of pain and more visits to doctors.    Never share your opioids with others. Be sure to store opioids in a secure place, locked if possible.Young children can easily swallow them and overdose.     You can overdose on opioids.  Signs of overdose include decrease or loss of consciousness, slowed breathing, trouble waking and blue lips.  If someone is worried about overdose, they should call 911.    If you are at risk for overdose, you may get naloxone (Narcan, a medicine that reverses the effects of opioids.  If you overdose, a friend or family member can give you Narcan while waiting for the ambulance.  They need to know the signs of overdose and how to give Narcan.    While you're taking opioids:    Don't use alcohol or street drugs. Taking them together can cause death.    Don't take any of these medicines unless your doctor says its okay.  Taking these with opioids can cause death.    Benzodiazepines (such as lorazepam         or diazepam)    Muscle relaxers (such as cyclobenzaprine)    sleeping pills    other opioids    Safe disposal of opioids  Find your area drug take-back program,  your pharmacy mail-back program, buy a special disposal bag (such as Deterra) from your pharmacy or flush them down the toilet.  Use the guidelines at:  www.fda.gov/drugs/resourcesforyou

## 2019-10-15 NOTE — PROGRESS NOTES
Prior to immunization administration, verified patients identity using patient s name and date of birth. Please see Immunization Activity for additional information.     Screening Questionnaire for Adult Immunization    Are you sick today?   No   Do you have allergies to medications, food, a vaccine component or latex?   Yes   Have you ever had a serious reaction after receiving a vaccination?   No   Do you have a long-term health problem with heart disease, lung disease, asthma, kidney disease, metabolic disease (e.g. diabetes), anemia, or other blood disorder?   Yes   Do you have cancer, leukemia, HIV/AIDS, or any other immune system problem?   No   In the past 3 months, have you taken medications that affect  your immune system, such as prednisone, other steroids, or anticancer drugs; drugs for the treatment of rheumatoid arthritis, Crohn s disease, or psoriasis; or have you had radiation treatments?   Yes   Have you had a seizure, or a brain or other nervous system problem?   Yes   During the past year, have you received a transfusion of blood or blood     products, or been given immune (gamma) globulin or antiviral drug?   No   For women: Are you pregnant or is there a chance you could become        pregnant during the next month?   No   Have you received any vaccinations in the past 4 weeks?   No     Immunization questionnaire was positive for at least one answer.  Notified [facility].        Per orders of CDL, injection of P23 and FLu given by Jenni Luo MA. Patient instructed to remain in clinic for 15 minutes afterwards, and to report any adverse reaction to me immediately.       Screening performed by Jenni Luo MA on 10/15/2019 at 11:08 AM.    Answers for HPI/ROS submitted by the patient on 10/15/2019   If you checked off any problems, how difficult have these problems made it for you to do your work, take care of things at home, or get along with other people?: Somewhat difficult  PHQ9 TOTAL SCORE:  3  JULIETH 7 TOTAL SCORE: 10

## 2019-10-16 ASSESSMENT — ANXIETY QUESTIONNAIRES: GAD7 TOTAL SCORE: 10

## 2019-10-16 ASSESSMENT — PATIENT HEALTH QUESTIONNAIRE - PHQ9: SUM OF ALL RESPONSES TO PHQ QUESTIONS 1-9: 3

## 2019-10-29 ENCOUNTER — ALLIED HEALTH/NURSE VISIT (OUTPATIENT)
Dept: FAMILY MEDICINE | Facility: OTHER | Age: 29
End: 2019-10-29
Payer: MEDICARE

## 2019-10-29 DIAGNOSIS — Z30.42 ENCOUNTER FOR SURVEILLANCE OF INJECTABLE CONTRACEPTIVE: Primary | ICD-10-CM

## 2019-10-29 PROCEDURE — 96372 THER/PROPH/DIAG INJ SC/IM: CPT

## 2019-10-29 PROCEDURE — 99207 ZZC NO CHARGE NURSE ONLY: CPT

## 2019-10-29 RX ADMIN — MEDROXYPROGESTERONE ACETATE 150 MG: 150 INJECTION, SUSPENSION INTRAMUSCULAR at 10:04

## 2019-10-29 NOTE — PROGRESS NOTES
Clinic Administered Medication Documentation    MEDICATION LIST:   Depo Provera Documentation    Prior to injection, verified patient identity using patient's name and date of birth. Medication was administered. Please see MAR and medication order for additional information. Patient instructed to remain in clinic for 15 minutes and report any adverse reaction to staff immediately .    BP: Data Unavailable    LAST PAP/EXAM:   Lab Results   Component Value Date    PAP NIL 04/25/2017     URINE HCG:not indicated    NEXT INJECTION DUE: 1/14/20 - 1/28/20    Was entire vial of medication used? Yes  Vial/Syringe: Single dose vial  Expiration Date:  07/2020

## 2019-11-06 ENCOUNTER — OFFICE VISIT (OUTPATIENT)
Dept: FAMILY MEDICINE | Facility: OTHER | Age: 29
End: 2019-11-06
Payer: MEDICARE

## 2019-11-06 VITALS
DIASTOLIC BLOOD PRESSURE: 86 MMHG | TEMPERATURE: 98.3 F | SYSTOLIC BLOOD PRESSURE: 122 MMHG | RESPIRATION RATE: 14 BRPM | BODY MASS INDEX: 31.28 KG/M2 | WEIGHT: 171 LBS | HEART RATE: 86 BPM | OXYGEN SATURATION: 98 %

## 2019-11-06 DIAGNOSIS — G89.29 CHRONIC BILATERAL LOW BACK PAIN WITH LEFT-SIDED SCIATICA: ICD-10-CM

## 2019-11-06 DIAGNOSIS — M54.42 CHRONIC BILATERAL LOW BACK PAIN WITH LEFT-SIDED SCIATICA: ICD-10-CM

## 2019-11-06 DIAGNOSIS — G89.4 CHRONIC PAIN SYNDROME: ICD-10-CM

## 2019-11-06 DIAGNOSIS — J01.90 ACUTE SINUSITIS WITH SYMPTOMS > 10 DAYS: ICD-10-CM

## 2019-11-06 DIAGNOSIS — J02.9 SORE THROAT: Primary | ICD-10-CM

## 2019-11-06 LAB
DEPRECATED S PYO AG THROAT QL EIA: NORMAL
SPECIMEN SOURCE: NORMAL

## 2019-11-06 PROCEDURE — 87880 STREP A ASSAY W/OPTIC: CPT | Performed by: PHYSICIAN ASSISTANT

## 2019-11-06 PROCEDURE — 87081 CULTURE SCREEN ONLY: CPT | Performed by: PHYSICIAN ASSISTANT

## 2019-11-06 PROCEDURE — 99214 OFFICE O/P EST MOD 30 MIN: CPT | Performed by: PHYSICIAN ASSISTANT

## 2019-11-06 RX ORDER — OXYCODONE HCL 5 MG/5 ML
5-10 SOLUTION, ORAL ORAL EVERY 6 HOURS PRN
Qty: 420 ML | Refills: 0 | Status: SHIPPED | OUTPATIENT
Start: 2019-11-12 | End: 2019-11-25

## 2019-11-06 RX ORDER — VALACYCLOVIR HYDROCHLORIDE 1 G/1
TABLET, FILM COATED ORAL
COMMUNITY
Start: 2019-11-03 | End: 2021-04-27

## 2019-11-06 RX ORDER — AZITHROMYCIN 250 MG/1
TABLET, FILM COATED ORAL
Qty: 6 TABLET | Refills: 0 | Status: SHIPPED | OUTPATIENT
Start: 2019-11-06 | End: 2019-11-12

## 2019-11-06 ASSESSMENT — PAIN SCALES - GENERAL: PAINLEVEL: MODERATE PAIN (4)

## 2019-11-06 NOTE — PROGRESS NOTES
Subjective     Katy Islas is a 29 year old female who presents to clinic today for the following health issues:    HPI     Acute Illness   Acute illness concerns: sore throat   Onset: 2 weeks     Fever: no    Chills/Sweats: no    Headache (location?): Yes- awful     Sinus Pressure:no    Conjunctivitis:  YES: both    Ear Pain: YES: left    Rhinorrhea: no    Congestion: no    Sore Throat: YES     Cough: no    Wheeze: no    Decreased Appetite: no: hard to eat with pain    Nausea: Yes    Vomiting: no    Diarrhea:  no    Dysuria/Freq.: no    Fatigue/Achiness: YES- not sleeping well     Sick/Strep Exposure: no     Therapies Tried and outcome: the spray OTC for sore throats- helps some     - Hurts bad on the left side, can't eat   - UC on 11/3/19, told canker sores      Valtrex, didn't help       Magic mouthwash        - GI next week  - Physical therapy eval   - Saw pain clinic - told PATIENT and then spinal cord stimulator           Review of Systems   ROS COMP: Constitutional, HEENT, cardiovascular, pulmonary, gi and gu systems are negative, except as otherwise noted.      Objective    /86   Pulse 86   Temp 98.3  F (36.8  C) (Temporal)   Resp 14   Wt 77.6 kg (171 lb)   SpO2 98%   BMI 31.28 kg/m    Body mass index is 31.28 kg/m .  Physical Exam   GENERAL APPEARANCE: ill appearing, alert and no distress  EYES: Eyes grossly normal to inspection, PERRLA, conjunctivae and sclerae without injection or discharge, EOM intact   HENT: Bilateral ear canals without erythema or cerumen, bilateral TM's with clear serous effusion and mild injection, no bulging, nasal turbinates with moderate-severe swelling and erythema, yellow nasal discharge, mouth without ulcers or lesions, oropharynx mildly erythematous with left tonsil erythematous with edema and white exudate, right tonsil normal, oral mucous membranes moist, bilateral maxillary and frontal sinus tenderness   NECK: Bilateral submandibular adenopathy, no  adenopathy in posterior cervical or supraclavicular regions  RESP: Lungs clear to auscultation - no rales, rhonchi or wheezes   CV: Regular rates and rhythm, normal S1 S2, no S3 or S4, no murmur, click or rub  MS: No musculoskeletal defects are noted and gait is age appropriate without ataxia   SKIN: No suspicious lesions or rashes, hydration status appears adeuqate with normal skin turgor   PSYCH: Alert and oriented x3; speech- coherent , normal rate and volume; able to articulate logical thoughts, able to abstract reason, no tangential thoughts, no hallucinations or delusions, mentation appears normal, Mood is euthymic. Affect is appropriate for this mood state and bright. Thought content is free of suicidal ideation, hallucinations, and delusions. Dress is adequate and upkept. Eye contact is good during conversation.       Diagnostic Test Results:  Labs reviewed in Epic    Strep - negative           Assessment & Plan       ICD-10-CM    1. Sore throat J02.9 Strep, Rapid Screen     Beta strep group A culture   2. Acute sinusitis with symptoms > 10 days J01.90 azithromycin (ZITHROMAX) 250 MG tablet   3. Chronic pain syndrome G89.4 oxyCODONE (ROXICODONE) 5 MG/5ML solution   4. Chronic bilateral low back pain with left-sided sciatica M54.42 oxyCODONE (ROXICODONE) 5 MG/5ML solution    G89.29      1 & 2.   - Negative strep, patient with asthma and 2 weeks of symptoms   - Exam suggestive of sinus infection, recommend treatment due to duration of symptoms   - Patient with many allergies, done well on Zpack in the past   - Discussed antibiotic use, duration, and side effects  - Albuterol inhaler as needed for cough  - Rest and fluids     3 & 4. Pain  - Not fully discussed today, but due for routine fill so this was completed, did review medication use and side effects including no driving   - Patient did update provider that she had physical therapy consult and pain clinic consult, starting physical therapy in pool twice a  week next week and pain clinic said if that doesn't work then spinal cord stimulator       The patient indicates understanding of these issues and agrees with the plan.    Return in about 1 week (around 11/13/2019) for if not improving.    Aura Rosario PA-C  [facility]

## 2019-11-06 NOTE — PROGRESS NOTES
Subjective     Katy Islas is a 29 year old female who presents to clinic today for the following health issues:    HPI     Hypertension Follow-up      Do you check your blood pressure regularly outside of the clinic? Yes     Are you following a low salt diet? No    Are your blood pressures ever more than 140 on the top number (systolic) OR more   than 90 on the bottom number (diastolic), for example 140/90? Yes    150/102 last week      Yesterday was normal 120/75   Lots of ups and down   Worse with nerves or pain   Starts pool therapy today     - Did Zpack, feeling better   - 4 days of back pain      No change to urination           ED note from last week   Disposition: GO TO ED/UCC NOW  - Huddling with a provider for recs, as pt frequently is evaluated in the ED for HTN with dizziness, as well as other symptoms, and she is currently being treated for a sinus illness with antibiotic and narcotic pain medication.     Huddled with ES, avoid antiinflammatory meds, push fluids, continue to monitor, get some rest, keep checking BP intermittently, if continues to rise or feels worse, go to ED. Follow up with CDL next week.         Review of Systems   ROS COMP: Constitutional, HEENT, cardiovascular, pulmonary, gi and gu systems are negative, except as otherwise noted.      Objective    /60   Pulse 84   Temp 97.1  F (36.2  C) (Temporal)   Resp 16   Wt 78.7 kg (173 lb 6.4 oz)   SpO2 96%   BMI 31.72 kg/m    Body mass index is 31.72 kg/m .  Physical Exam   GENERAL APPEARANCE: healthy, alert and no distress  EYES: Eyes grossly normal to inspection, PERRLA, conjunctivae and sclerae without injection or discharge, EOM intact   RESP: Lungs clear to auscultation - no rales, rhonchi or wheezes    CV: Regular rates and rhythm, normal S1 S2, no S3 or S4, no murmur, click or rub, no peripheral edema and peripheral pulses strong and symmetric bilaterally   MS: No musculoskeletal defects are noted and gait is age  appropriate without ataxia   SKIN: No suspicious lesions or rashes, hydration status appears adeuqate with normal skin turgor   PSYCH: Alert and oriented x3; speech- coherent , normal rate and volume; able to articulate logical thoughts, able to abstract reason, no tangential thoughts, no hallucinations or delusions, mentation appears normal, Mood is euthymic. Affect is appropriate for this mood state and bright. Thought content is free of suicidal ideation, hallucinations, and delusions. Dress is adequate and upkept. Eye contact is good during conversation.       Diagnostic Test Results:  Labs reviewed in Epic  none         Assessment & Plan       ICD-10-CM    1. Hypertension goal BP (blood pressure) < 130/80 I10    2. Chronic bilateral low back pain with left-sided sciatica M54.42 cyclobenzaprine (FLEXERIL) 10 MG tablet    G89.29    3. Bee sting allergy Z91.030 EPINEPHrine (EPIPEN/ADRENACLICK/OR ANY BX GENERIC EQUIV) 0.3 MG/0.3ML injection 2-pack   4. Flank pain R10.9 *UA reflex to Microscopic     Urine Microscopic   5. Multiple nevi D22.9 DERMATOLOGY REFERRAL     1. HTN   - Discussed various etiology for elevated BP x1 - including infection, illness, anxiety, and pain   - Discussed with monitoring the appropriate worrisome levels  - Continue medication without change     2. Referral back to her dermatologist place for yearly recheck     3. Refilled medications as needed - Robaxin and Epi Pen     4. Patient states flank pain, could be UTI as these are chronic for her, seems unlikely since was just on zpack for sinus infection but will check urine, await results     Due to language barrier, an  was present during the history-taking and subsequent discussion (and for part of the physical exam) with this patient.    The patient indicates understanding of these issues and agrees with the plan.    Return in about 1 month (around 12/12/2019).    Aura Rosario PA-C  [facility]  [place]

## 2019-11-07 LAB
BACTERIA SPEC CULT: NORMAL
SPECIMEN SOURCE: NORMAL

## 2019-11-08 ENCOUNTER — NURSE TRIAGE (OUTPATIENT)
Dept: FAMILY MEDICINE | Facility: OTHER | Age: 29
End: 2019-11-08

## 2019-11-08 ENCOUNTER — MYC MEDICAL ADVICE (OUTPATIENT)
Dept: FAMILY MEDICINE | Facility: OTHER | Age: 29
End: 2019-11-08

## 2019-11-08 NOTE — TELEPHONE ENCOUNTER
Disposition: GO TO ED/UCC NOW  - Huddling with a provider for recs, as pt frequently is evaluated in the ED for HTN with dizziness, as well as other symptoms, and she is currently being treated for a sinus illness with antibiotic and narcotic pain medication.    Huddled with ES, avoid antiinflammatory meds, push fluids, continue to monitor, get some rest, keep checking BP intermittently, if continues to rise or feels worse, go to ED. Follow up with CDL next week.    Replied to patient via [employer] with this plan.    Marianna Layne, RN, BSN

## 2019-11-08 NOTE — TELEPHONE ENCOUNTER
Patient is hearing impaired.  Hx of HTN.  Replied to patient via mychart.    Marianna Layne, RN, BSN

## 2019-11-08 NOTE — TELEPHONE ENCOUNTER
Additional Information    Negative: Sounds like a life-threatening emergency to the triager    Negative: Pregnant > 20 weeks and new hand or face swelling    Negative: Pregnant > 20 weeks and BP > 140/90    Systolic BP >= 160 OR Diastolic >= 100, and any cardiac or neurologic symptoms (e.g., chest pain, difficulty breathing, unsteady gait, blurred vision)    Protocols used: HIGH BLOOD PRESSURE-A-OH

## 2019-11-08 NOTE — TELEPHONE ENCOUNTER
Reason for call:  Patient reporting a symptom    Symptom or request: bp 154/101 no sx    Duration (how long have symptoms been present):     Have you been treated for this before? No    Additional comments: patient would like call to triage declined appt    Phone Number patient can be reached at:  Home number on file [phone] (home)    Best Time:  any    Can we leave a detailed message on this number:  YES    Call taken on 11/8/2019 at 8:56 AM by Kenzie Maria

## 2019-11-08 NOTE — TELEPHONE ENCOUNTER
patient responded via [employer]. See MyChart encounter.    RN - please close encounter, I am unable to.

## 2019-11-12 ENCOUNTER — OFFICE VISIT (OUTPATIENT)
Dept: FAMILY MEDICINE | Facility: OTHER | Age: 29
End: 2019-11-12
Payer: MEDICARE

## 2019-11-12 VITALS
RESPIRATION RATE: 16 BRPM | WEIGHT: 173.4 LBS | OXYGEN SATURATION: 96 % | BODY MASS INDEX: 31.72 KG/M2 | SYSTOLIC BLOOD PRESSURE: 100 MMHG | DIASTOLIC BLOOD PRESSURE: 60 MMHG | HEART RATE: 84 BPM | TEMPERATURE: 97.1 F

## 2019-11-12 DIAGNOSIS — I10 HYPERTENSION GOAL BP (BLOOD PRESSURE) < 130/80: Primary | ICD-10-CM

## 2019-11-12 DIAGNOSIS — G89.29 CHRONIC BILATERAL LOW BACK PAIN WITH LEFT-SIDED SCIATICA: ICD-10-CM

## 2019-11-12 DIAGNOSIS — M54.42 CHRONIC BILATERAL LOW BACK PAIN WITH LEFT-SIDED SCIATICA: ICD-10-CM

## 2019-11-12 DIAGNOSIS — D22.9 MULTIPLE NEVI: ICD-10-CM

## 2019-11-12 DIAGNOSIS — Z91.030 BEE STING ALLERGY: ICD-10-CM

## 2019-11-12 DIAGNOSIS — R10.9 FLANK PAIN: ICD-10-CM

## 2019-11-12 LAB
ALBUMIN UR-MCNC: NEGATIVE MG/DL
APPEARANCE UR: NORMAL
BACTERIA #/AREA URNS HPF: ABNORMAL /HPF
BILIRUB UR QL STRIP: NEGATIVE
COLOR UR AUTO: YELLOW
GLUCOSE UR STRIP-MCNC: NEGATIVE MG/DL
HGB UR QL STRIP: NEGATIVE
KETONES UR STRIP-MCNC: NEGATIVE MG/DL
LEUKOCYTE ESTERASE UR QL STRIP: NEGATIVE
NITRATE UR QL: NEGATIVE
NON-SQ EPI CELLS #/AREA URNS LPF: ABNORMAL /LPF
PH UR STRIP: 5.5 PH (ref 5–7)
RBC #/AREA URNS AUTO: ABNORMAL /HPF
SOURCE: NORMAL
SP GR UR STRIP: 1.02 (ref 1–1.03)
UROBILINOGEN UR STRIP-ACNC: 0.2 EU/DL (ref 0.2–1)
WBC #/AREA URNS AUTO: ABNORMAL /HPF

## 2019-11-12 PROCEDURE — 99214 OFFICE O/P EST MOD 30 MIN: CPT | Performed by: PHYSICIAN ASSISTANT

## 2019-11-12 PROCEDURE — 81001 URINALYSIS AUTO W/SCOPE: CPT | Performed by: PHYSICIAN ASSISTANT

## 2019-11-12 RX ORDER — EPINEPHRINE 0.3 MG/.3ML
0.3 INJECTION SUBCUTANEOUS
Qty: 0.6 ML | Refills: 3 | Status: SHIPPED | OUTPATIENT
Start: 2019-11-12 | End: 2020-03-10

## 2019-11-12 RX ORDER — CYCLOBENZAPRINE HCL 10 MG
10 TABLET ORAL 3 TIMES DAILY PRN
Qty: 90 TABLET | Refills: 3 | Status: SHIPPED | OUTPATIENT
Start: 2019-11-12 | End: 2020-04-13

## 2019-11-12 ASSESSMENT — PAIN SCALES - GENERAL: PAINLEVEL: MODERATE PAIN (4)

## 2019-11-12 NOTE — PATIENT INSTRUCTIONS
- Recheck with Dermatology around 12/21/19       [facility] in [place] Dr. Fred Longoria

## 2019-11-16 ENCOUNTER — NURSE TRIAGE (OUTPATIENT)
Dept: NURSING | Facility: CLINIC | Age: 29
End: 2019-11-16

## 2019-11-17 NOTE — TELEPHONE ENCOUNTER
"Pt calling via . C/o Rapid heart beat (122bpm), /89, head ache (4/10), vomiting twice, fever of 101.2.  Symptoms triaged and advised to go to ER. Pt voiced understand and will follow disposition.   Glendy Mercedes RN  [facility] Nurse Advisor       Reason for Disposition    [1] Vomiting AND [2] 2 or more times (Exception: similar to previous migraines)    Additional Information    Negative: Difficult to awaken or acting confused (e.g., disoriented, slurred speech)    Negative: [1] Weakness of the face, arm or leg on one side of the body AND [2] new onset    Negative: [1] Numbness of the face, arm or leg on one side of the body AND [2] new onset    Negative: [1] Loss of speech or garbled speech AND [2] new onset    Negative: Passed out (i.e., lost consciousness, collapsed and was not responding)    Negative: Sounds like a life-threatening emergency to the triager    Negative: Followed a head injury within last 3 days    Negative: Pregnant    Negative: Traumatic Brain Injury (TBI) is suspected    Negative: Unable to walk, or can only walk with assistance (e.g., requires support)    Negative: Stiff neck (can't touch chin to chest)    Negative: Severe pain in one eye    Negative: [1] Other family members (or roommates) with headaches AND [2] possibility of carbon monoxide exposure    Negative: [1] SEVERE headache AND [2] fever    Negative: [1] SEVERE headache AND [2] sudden-onset (i.e., reaching maximum intensity within seconds)    Negative: [1] SEVERE headache (e.g., excruciating) AND [2] \"worst headache\" of life    Negative: Loss of vision or double vision (Exception: same as prior migraines)    Negative: [1] Fever > 100.0 F (37.8 C) AND [2] diabetes mellitus or weak immune system (e.g., HIV positive, cancer chemo, splenectomy, chronic steroids)    Negative: Patient sounds very sick or weak to the triager    Negative: [1] SEVERE headache (e.g., excruciating) AND [2] not improved after 2 hours of " pain medicine    Protocols used: HEADACHE-A-AH

## 2019-11-19 ENCOUNTER — OFFICE VISIT (OUTPATIENT)
Dept: GASTROENTEROLOGY | Facility: CLINIC | Age: 29
End: 2019-11-19
Payer: MEDICARE

## 2019-11-19 ENCOUNTER — MYC MEDICAL ADVICE (OUTPATIENT)
Dept: FAMILY MEDICINE | Facility: OTHER | Age: 29
End: 2019-11-19

## 2019-11-19 ENCOUNTER — TELEPHONE (OUTPATIENT)
Dept: GASTROENTEROLOGY | Facility: CLINIC | Age: 29
End: 2019-11-19

## 2019-11-19 VITALS
WEIGHT: 173.7 LBS | HEIGHT: 62 IN | SYSTOLIC BLOOD PRESSURE: 120 MMHG | BODY MASS INDEX: 31.96 KG/M2 | DIASTOLIC BLOOD PRESSURE: 80 MMHG | OXYGEN SATURATION: 97 % | HEART RATE: 86 BPM

## 2019-11-19 DIAGNOSIS — R11.2 NAUSEA VOMITING AND DIARRHEA: Primary | ICD-10-CM

## 2019-11-19 DIAGNOSIS — K64.8 INTERNAL HEMORRHOIDS: ICD-10-CM

## 2019-11-19 DIAGNOSIS — R19.7 DIARRHEA, UNSPECIFIED TYPE: ICD-10-CM

## 2019-11-19 DIAGNOSIS — R10.9 ABDOMINAL CRAMPING: ICD-10-CM

## 2019-11-19 DIAGNOSIS — R19.7 NAUSEA VOMITING AND DIARRHEA: Primary | ICD-10-CM

## 2019-11-19 DIAGNOSIS — K52.9 COLITIS: ICD-10-CM

## 2019-11-19 DIAGNOSIS — K52.9 GASTROENTERITIS: ICD-10-CM

## 2019-11-19 PROCEDURE — 87329 GIARDIA AG IA: CPT | Performed by: PHYSICIAN ASSISTANT

## 2019-11-19 PROCEDURE — 87177 OVA AND PARASITES SMEARS: CPT | Performed by: PHYSICIAN ASSISTANT

## 2019-11-19 PROCEDURE — 83993 ASSAY FOR CALPROTECTIN FECAL: CPT | Performed by: PHYSICIAN ASSISTANT

## 2019-11-19 PROCEDURE — 36415 COLL VENOUS BLD VENIPUNCTURE: CPT | Performed by: PHYSICIAN ASSISTANT

## 2019-11-19 PROCEDURE — 87209 SMEAR COMPLEX STAIN: CPT | Performed by: PHYSICIAN ASSISTANT

## 2019-11-19 PROCEDURE — 87506 IADNA-DNA/RNA PROBE TQ 6-11: CPT | Performed by: PHYSICIAN ASSISTANT

## 2019-11-19 PROCEDURE — 99214 OFFICE O/P EST MOD 30 MIN: CPT | Performed by: PHYSICIAN ASSISTANT

## 2019-11-19 PROCEDURE — 87328 CRYPTOSPORIDIUM AG IA: CPT | Performed by: PHYSICIAN ASSISTANT

## 2019-11-19 RX ORDER — HYOSCYAMINE SULFATE 0.125 MG
0.12 TABLET ORAL EVERY 4 HOURS PRN
Qty: 60 TABLET | Refills: 1 | Status: SHIPPED | OUTPATIENT
Start: 2019-11-19 | End: 2020-02-04

## 2019-11-19 ASSESSMENT — MIFFLIN-ST. JEOR: SCORE: 1461.15

## 2019-11-19 ASSESSMENT — PAIN SCALES - GENERAL: PAINLEVEL: MODERATE PAIN (5)

## 2019-11-19 NOTE — TELEPHONE ENCOUNTER
LPN received ivelisse a 2nd time from the call center that patient was calling. LPN gave the ok for the call center to schedule patient today.     Cathy Benitez LPN

## 2019-11-19 NOTE — TELEPHONE ENCOUNTER
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 Do you want to see patient sooner? Would you like her triaged in the mean time?

## 2019-11-19 NOTE — NURSING NOTE
"Katy Islas's goals for this visit include:   Chief Complaint   Patient presents with     RECHECK     Yellow diarrhea, abdominal pain, vomiting started about 1.5 weeks ago; Patient states she had a fever on Saturday and also has a sinus infection- unsure if this is all related; Patient also states her heartburn has been awful       She requests these members of her care team be copied on today's visit information: PCP    PCP: Aura Rosario    Referring Provider:  No referring provider defined for this encounter.    /80 (BP Location: Left arm, Patient Position: Sitting, Cuff Size: Adult Regular)   Pulse 86   Ht 1.575 m (5' 2\")   Wt 78.8 kg (173 lb 11.2 oz)   SpO2 97%   BMI 31.77 kg/m      Do you need any medication refills at today's visit? No    Cathy Benitez LPN      "

## 2019-11-19 NOTE — TELEPHONE ENCOUNTER
I can not accomodates early, should get triaged as for those symptoms there is little I can do in the clinic. She should get in with her GI doctor.     Zach Rosario PA-C  [facility]

## 2019-11-19 NOTE — PATIENT INSTRUCTIONS
We will notify you once we have the results of your stool studies.     Take Zofran disintegrating tablet three times a day for the nausea.     Stay well hydrated with fluids and Pedialyte or ensure.     Start taking levsin (hyoscyamine) 4 times a day as needed for abdominal cramping.     Start using hydrocortisone cream externally and internally for hemorrhoids.     Try Sitz baths periodically.     Follow up as needed.

## 2019-11-19 NOTE — PROGRESS NOTES
GASTROENTEROLOGY FOLLOW UP CLINIC VISIT    CC/REFERRING MD:    Aura Rosario    REASON FOR CONSULTATION:  RECHECK (Yellow diarrhea, abdominal pain, vomiting started about 1.5 weeks ago)      HISTORY OF PRESENT ILLNESS:    Katy Islas is 29 year old female who presents for concerns with nausea, vomiting and diarrhea that started about 1.5 weeks ago. She notes that she was recently treated for a sinus infection with azithromycin. She notes multiple episodes of vomiting recently. She felt feverish this past week and has noted multiple loose watery stools daily with some brbpr. She also has generalized abdominal pain. She has been to the ED multiple times in the past few weeks. Most recently several days ago for these same symptoms. She had labs which were overall unremarkable. She was told symptoms could be related to gastroenteritis. She has submitted stools today which were previously ordered back in July 2019.     No hematemesis. No black stools. She has hx of heartburn and takes protonix bid. She is also on anti-nausea regimen currently. Of note, she does have hx of nausea, vomiting. Previous work up includes an EGD which was unremarkable.     She also mentioned hx of crohn's in the past and had an updated colonoscopy this summer July 2019 which did not reveal any signs of IBD.      PREVIOUS ENDOSCOPY:    Colonoscopy 7/17/19  Impression:       - The examined portion of the ileum was normal.                             - No signs of IBD. Area of abnormality on the CT scan is normal endoscopically.                             - The transverse colon, ascending colon and cecum are normal.                             - One 4 mm polyp in the descending colon, removed with a cold snare. Resected and retrieved.                             - The rectum and sigmoid colon are normal.                             - Non-bleeding internal hemorrhoids. This may be the source of her rectal bleeding.                              - Anal papilla(e) were hypertrophied.     SPECIMEN(S):   A: Random colon biopsy   B: Descending colon biopsy     FINAL DIAGNOSIS:   A. RANDOM COLON BIOPSY:   - Colonic mucosa with no significant histologic abnormality   - Negative for active inflammation and lymphocytic colitis     B. DESCENDING COLON POLYP, POLYPECTOMY:   - Hyperplastic polyp, negative for dysplasia     I have personally reviewed all specimens and/or slides, including the   listed special stains, and used them   with my medical judgement to determine or confirm the final diagnosis.     Electronically signed out by:     Victorino Bajwa M.D., [facility]     Upper Endoscopy 4/12/19  Impression:       - Esophagogastric landmarks identified.                             - Gastroesophageal flap valve classified as Hill Grade III (minimal fold, loose to endoscope, hiatal hernia likely).                             - Normal stomach.                             - Normal duodenal bulb, first portion of the duodenum and second portion of the duodenum.                             - Biopsies were taken with a cold forceps for Helicobacter pylori testing.                             - No findings to account for complaint of coffee ground emesis and hematemesis.       SPECIMEN(S):   Antral biopsy     FINAL DIAGNOSIS:   ANTRUM, BIOPSY:   - Gastric mucosa with no significant inflammation   - No H. pylori like organisms identified on routine staining   - Negative for intestinal metaplasia or dysplasia     I have personally reviewed all specimens and/or slides, including the listed special stains, and used them with my medical judgement to determine or confirm the final diagnosis.     Electronically signed out by:     Adrian Wagner M.D., [facility]         PERTINENT PAST MEDICAL HISTORY:    Past Medical History:   Diagnosis Date     Abdominal pain 10/31- 11/4/2005    [facility] admit for Crohn's     Allergic rhinitis, cause  unspecified     Allergic rhinitis     Arthritis      C. difficile diarrhea     Past, no current diarrhea.     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, without mention of intractable epilepsy     pseudoseizures diagnosed after extensive neurologic eval     Migraine 07/21/12    D/C 07/22/12-[facility]     Migraine, unspecified, without mention of intractable migraine without mention of status migrainosus     Migraine     Mild intermittent asthma     mild intermittent     Mycoplasma infection in conditions classified elsewhere and of unspecified site      Other chronic pain     Back pain for 6 years     Renal disease     Kidney stones     Unspecified hearing loss     congenital hearing loss       PREVIOUS SURGERIES:   Past Surgical History:   Procedure Laterality Date     AS REMOVAL OF COCCYX  10/18/2017     C FUSION OF SACROILIAC JOINT  10/26/2018     COLONOSCOPY  7/1/2009    [facility] [place]     COLONOSCOPY N/A 7/17/2019    Procedure: Colonoscopy, With Polypectomy And Biopsy;  Surgeon: Edwin Conde MD;  Location:  OR     COLONOSCOPY WITH CO2 INSUFFLATION N/A 7/17/2019    Procedure: COLONOSCOPY, WITH CO2 INSUFFLATION;  Surgeon: Edwin Conde MD;  Location: [facility] OR     COMBINED ESOPHAGOSCOPY, GASTROSCOPY, DUODENOSCOPY (EGD) WITH CO2 INSUFFLATION N/A 4/12/2019    Procedure: COMBINED ESOPHAGOSCOPY, GASTROSCOPY, DUODENOSCOPY (EGD) WITH CO2 INSUFFLATION;  Surgeon: Edwin Conde MD;  Location:  OR      ESOPHAGOSCOPY, GASTROSCOPY, DUODENOSCOPY (EGD), COMBINED N/A 4/12/2019    Procedure: Combined Esophagoscopy, Gastroscopy, Duodenoscopy (Egd), Biopsy Single Or Multiple;  Surgeon: Edwin Conde MD;  Location: [facility] OR     FUSION SPINE POSTERIOR MINIMALLY INVASIVE ONE LEVEL N/A 2/23/2017    Procedure: FUSION SPINE POSTERIOR MINIMALLY INVASIVE ONE LEVEL;  L4-5 Oblique Lateral Lumbar Interbody Fusion   Epidural steroid injection.   Transpedicular Bone marrow aspiration;  Surgeon: Jeniffer Eugene MD;  Location: [facility] OR     HC COLONOSCOPY THRU STOMA WITH BIOPSY  10/29/2003    Impression is that of normal appearing colonoscopy, without evidence of rectal bleeding.     HC COLONOSCOPY THRU STOMA, DIAGNOSTIC  10/00    normal     HC COLONOSCOPY THRU STOMA, DIAGNOSTIC  Oct 2009    Dr López- normal     HC EEG AWAKE AND SLEEP      abnormal     HC MRI BRAIN W/O CONTRAST  12/00    normal     HC REMOVAL GALLBLADDER  8/5/2009    [facility]     HC UGI ENDOSCOPY DIAG W BIOPSY  11/11/09    Normal esophagus     HC UGI ENDOSCOPY, SIMPLE EXAM  7/00, 10/00    mild chronic esophagitis and duodenitis, neg H pylori     HC UGI ENDOSCOPY, SIMPLE EXAM  01/20/2005    Esophagogastroduodenoscopy, colonoscopy with biopsies.  [facility] UGI ENDOSCOPY, SIMPLE EXAM  7/1/2009    [facility].      UGI ENDOSCOPY, SIMPLE EXAM  11/11/2009    attempted upper GI, pt. could not tolerate procedure:[facility]     ORTHOPEDIC SURGERY  October 19,2011    diskectomy L4-L5       ALLERGIES:     Allergies   Allergen Reactions     Iohexol Hives     Other reaction(s): Hives  IV contrast; patient states she tolerates contrast if she gets benadryl before  IV contrast; patient states she tolerates contrast if she gets benadryl before     Ativan [Lorazepam] Hives     At the IV site     Baclofen      hives     Bees Hives, Swelling and Difficulty breathing     Caffeine      Contrast Dye       Hives,   Updated 5/10/2016 CT Contrast.     Iodine Hives     Methocarbamol Swelling     Metoclopramide      Other reaction(s): Tremors  LW Reaction: shaking/sweating     Midazolam      Other reaction(s): Agitation     Monosodium Glutamate      Morphine Other (See Comments)     Difficulty with urination     Nsaids Other (See Comments)     GI BLEED x2     Other (Do Not Use) Other (See Comments)     Xanaflex- pt becomes disoriented and loses bladder control     Reglan [Metoclopramide Hcl] Other (See Comments)     shaking     Soma [Carisoprodol] Visual Disturbance     Sleep walking     Tizanidine      Topamax Other (See Comments)     Topamax [Topiramate] Nausea     Tingling  GI/Vomit     Tramadol      Severe Headache, Seizure Risk     Tylenol W/Codeine [Acetaminophen-Codeine] Nausea and Itching     Tylenol 3     Valium Other (See Comments)     Becomes aggressive and angry     Versed Other (See Comments)     Zolmitriptan      Makes face feel like its twitching     Droperidol Anxiety     Flu Virus Vaccine Rash      Arm swelling       PERTINENT MEDICATIONS:    Current Outpatient Medications:      acetaminophen (TYLENOL) 500 MG tablet, Take 2 tablets (1,000 mg) by mouth 3 times daily as needed for mild pain, Disp: 100 tablet, Rfl: 0     albuterol (PROAIR HFA/PROVENTIL HFA/VENTOLIN HFA) 108 (90 Base) MCG/ACT inhaler, Inhale 2 puffs into the lungs every 6 hours as needed for shortness of breath / dyspnea or wheezing, Disp: 3 Inhaler, Rfl: 3     albuterol (PROVENTIL) (2.5 MG/3ML) 0.083% neb solution, Take 1 vial (2.5 mg) by nebulization every 6 hours as needed for shortness of breath / dyspnea or wheezing, Disp: 50 vial, Rfl: 11     alum & mag hydroxide-simethicone (MAALOX ADVANCED MAX ST) 400-400-40 MG/5ML SUSP suspension, Take 15 mLs by mouth every 4 hours as needed for indigestion (mix with lidocaine), Disp: 355 mL, Rfl: 11     ARIPiprazole (ABILIFY) 15 MG tablet, Take 1 tablet (15 mg) by mouth At Bedtime, Disp: 90 tablet,  Rfl: 3     aspirin (ASA) 81 MG tablet, Take 1 tablet (81 mg) by mouth daily, Disp: 90 tablet, Rfl: 3     azelastine (OPTIVAR) 0.05 % ophthalmic solution, Place 1 drop into both eyes 2 times daily, Disp: 6 mL, Rfl: 11     busPIRone (BUSPAR) 15 MG tablet, Take 1 tablet (15 mg) by mouth 2 times daily, Disp: 60 tablet, Rfl: 5     cyclobenzaprine (FLEXERIL) 10 MG tablet, Take 1 tablet (10 mg) by mouth 3 times daily as needed for muscle spasms or other (back pain), Disp: 90 tablet, Rfl: 3     dicyclomine (BENTYL) 20 MG tablet, Take 1 tablet (20 mg) by mouth 4 times daily as needed (ABDOMINAL CRAMPING) AS NEEDED FOR CRAMPING, Disp: 90 tablet, Rfl: 3     diphenhydrAMINE (BENADRYL) 50 MG capsule, Take 1-2 capsules ( mg) by mouth every 6 hours as needed for itching, allergies or sleep, Disp: 90 capsule, Rfl: 3     DULoxetine (CYMBALTA) 60 MG EC capsule, Take 1 capsule (60 mg) by mouth daily, Disp: 90 capsule, Rfl: 3     EPINEPHrine (EPIPEN/ADRENACLICK/OR ANY BX GENERIC EQUIV) 0.3 MG/0.3ML injection 2-pack, Inject 0.3 mLs (0.3 mg) into the muscle once as needed for anaphylaxis, Disp: 0.6 mL, Rfl: 3     gabapentin (NEURONTIN) 300 MG capsule, Take 1-3 capsules (300-900 mg) by mouth 3 times daily, Disp: 270 capsule, Rfl: 5     levocetirizine (XYZAL) 5 MG tablet, Take 1 tablet (5 mg) by mouth every evening, Disp: 90 tablet, Rfl: 3     lidocaine (XYLOCAINE) 2 % solution, Swish and swallow 15 mLs in mouth every 4 hours as needed for other (GERD) ; Max 8 doses/24 hour period. Mix with 15 mLs Maalox or Mylanta., Disp: 100 mL, Rfl: 3     losartan (COZAAR) 100 MG tablet, Take 1 tablet (100 mg) by mouth daily, Disp: 90 tablet, Rfl: 3     medical cannabis (Patient's own supply), (The purpose of this order is to document that the patient reports taking medical cannabis.  This is not a prescription, and is not used to certify that the patient has a qualifying medical condition.), Disp: 0 Information only, Rfl: 0      medroxyPROGESTERone (DEPO-PROVERA) 150 MG/ML IM injection, Inject 1 mL (150 mg) into the muscle every 3 months, Disp: 3 mL, Rfl: 3     methocarbamol (ROBAXIN) 750 MG tablet, Take 1 tablet (750 mg) by mouth 3 times daily as needed for muscle spasms, Disp: 60 tablet, Rfl: 3     metoprolol succinate ER (TOPROL-XL) 200 MG 24 hr tablet, Take 1 tablet (200 mg) by mouth daily, Disp: 90 tablet, Rfl: 1     NONFORMULARY, Apply 30 mLs topically 4 times daily, Disp: , Rfl:      ondansetron (ZOFRAN-ODT) 8 MG ODT tab, Take 1 tablet (8 mg) by mouth every 8 hours as needed for nausea, Disp: 45 tablet, Rfl: 3     oxyCODONE (ROXICODONE) 5 MG/5ML solution, Take 5-10 mLs (5-10 mg) by mouth every 6 hours as needed for pain (max six 5 ML doses/day, total daily dose max 30 mg) (two week supply), Disp: 420 mL, Rfl: 0     pantoprazole (PROTONIX) 40 MG EC tablet, Take 1 tablet (40 mg) by mouth 2 times daily Take 30-60 minutes before a meal., Disp: 180 tablet, Rfl: 3     prazosin (MINIPRESS) 1 MG capsule, Take 1 mg by mouth At Bedtime, Disp: , Rfl: 5     rizatriptan (MAXALT-MLT) 5 MG ODT, Take 1-2 tablets (5-10 mg) by mouth at onset of headache for migraine May repeat in 2 hours. Max 6 tablets/24 hours., Disp: 18 tablet, Rfl: 3     sucralfate (CARAFATE) 1 GM tablet, Take 1 tablet (1 g) by mouth 4 times daily as needed (heartburn), Disp: 360 tablet, Rfl: 3     valACYclovir (VALTREX) 1000 mg tablet, 2 tabs at onset of mouth sores.  Repeat dose in 12 hours., Disp: , Rfl:     Current Facility-Administered Medications:      medroxyPROGESTERone (DEPO-PROVERA) injection 150 mg, 150 mg, Intramuscular, Q90 Days, Aura Rosario PA-C, 150 mg at 10/29/19 1004     medroxyPROGESTERone (DEPO-PROVERA) injection 150 mg, 150 mg, Intramuscular, Q90 Days, Aura Rosario PA-C, 150 mg at 08/05/19 1131    FAMILY HISTORY:   Family History   Problem Relation Age of Onset     Gastrointestinal Disease Brother         severe Crohn's      "Neurologic Disorder Brother         Seizures post head injury     Depression Brother      Substance Abuse Brother      Genitourinary Problems Father         kidney stones     Diabetes Father      Heart Disease Father         Open heart surgery     Breast Cancer Maternal Grandmother      Parkinsonism Maternal Grandmother      Cerebrovascular Disease Paternal Grandmother      Cancer Maternal Grandfather         Lung     Cardiovascular Paternal Grandfather         Heart Attack     Substance Abuse Mother         in recovery x1 year      Lung Cancer Paternal Uncle      Cancer Paternal Uncle      Lung Cancer Maternal Uncle         ROS:      No weight loss  No blurry vision, double vision or change in vision  No sore throat  No lymphadenopathy  No headache, paraesthesias, or weakness in a limb  No shortness of breath or wheezing  No chest pain or pressure  No arthralgias or myalgias  No rashes or skin changes  No odynophagia or dysphagia  +n/v/d   +brbpr   No dysuria, frequency or urgency  No hot/cold intolerance or polyria  No anxiety or depression  PHYSICAL EXAMINATION:  Constitutional: aaox3, cooperative, pleasant, not dyspneic/diaphoretic, no acute distress  Vitals reviewed: /80 (BP Location: Left arm, Patient Position: Sitting, Cuff Size: Adult Regular)   Pulse 86   Ht 1.575 m (5' 2\")   Wt 78.8 kg (173 lb 11.2 oz)   SpO2 97%   BMI 31.77 kg/m     Wt:   Wt Readings from Last 2 Encounters:   11/19/19 78.8 kg (173 lb 11.2 oz)   11/12/19 78.7 kg (173 lb 6.4 oz)      Eyes: Sclera anicteric/injected  Ears/nose/mouth/throat: Normal oropharynx without ulcers or exudate, mucus membranes moist  Neck: supple  CV: No edema  Respiratory: Unlabored breathing  Abd: Nondistended, no masses, +bs, no hepatosplenomegaly, nontender during palpation while talking to patient, no peritoneal signs  Rectal exam: perianal irritation, likely from frequent BM's. No sign of external hemorrhoid.   Skin: warm, perfused, no " jaundice  Psych: Normal affect  MSK: Normal gait      PERTINENT STUDIES:   Most recent CBC:  Recent Labs   Lab Test 08/21/19  0021 07/20/19  1400   WBC 5.9 7.7   HGB 15.0 13.8   HCT 41.9 38.9    259     Most recent hepatic panel:  Recent Labs   Lab Test 09/02/19 08/30/19   ALT 44 30   AST 18 13*     Most recent creatinine:  Recent Labs   Lab Test 09/14/19 09/02/19   CR 0.88 0.96           ASSESSMENT/PLAN:    Katy Islas is a 29 year old female who presents for n/v/d onset 1.5 weeks ago. Of note she does have hx of chronic n/v which has been evaluated in the past with EGD. She also has hx of multiple imaging scans for similar symptoms. The sudden onset of her worsening symptoms seem to be related to a gastroenteritis. She has submitted stool studies today, we will update her on results once available. Recommended she stay well hydrated with fluids, Pedialyte and/or Gatorade. Continue anti-nausea regimen for nausea/vomiting. Continue protonix BID. Will rx levsin for cramping as needed.     Of note, she previously mentioned concerns for crohn's disease. Recent colonoscopy in July 2019 however was without signs of IBD. She does have some brbpr with her frequent BM's currently which are likely related to internal hemorrhoids that were noted on colonoscopy. rx of hydrocortisone cream that she can use externally for the perianal irritation noted on today's rectal exam and also can be used internally with applicator for internal hemorrhoids. Also recommended sitz baths.     Follow up as needed.       Nausea vomiting and diarrhea  Abdominal cramping  - hyoscyamine (ANASPAZ/LEVSIN) 0.125 MG tablet  Dispense: 60 tablet; Refill: 1  Gastroenteritis  Internal hemorrhoids  - hydrocortisone (ANUSOL-HC) 2.5 % cream  Dispense: 30 g; Refill: 1      Thank you for this consultation.  It was a pleasure to participate in the care of this patient; please contact us with any further questions.  A total of 25 minutes, face to  face, was spent with this patient, >50% of which was counseling regarding the above delineated issues.    This note was created with voice recognition software, and while reviewed for accuracy, typos may remain.     Junaid Pritchett PA-C  Gastroenterology   [facility]

## 2019-11-19 NOTE — TELEPHONE ENCOUNTER
[facility] Call Center    Phone Message    May a detailed message be left on voicemail: yes    Reason for Call: Other: Pt called to schedule an appointment with Albertgeraldo Shreyas today because she's having yellow watery stool. Writer unable to verify if today at 3:00 would work via [employer]. Please call pt back to discuss scheduling. Pt is requesting a stool sample lab test be ordered so she can drop a stool test off at the lab. Please advise.     Action Taken: Message routed to:  Adult Clinics: Gastroenterology (GI) p [phone]

## 2019-11-20 LAB
C COLI+JEJUNI+LARI FUSA STL QL NAA+PROBE: NOT DETECTED
C PARVUM AG STL QL IA: NEGATIVE
CALPROTECTIN STL-MCNT: 6.2 MG/KG (ref 0–49.9)
EC STX1 GENE STL QL NAA+PROBE: NOT DETECTED
EC STX2 GENE STL QL NAA+PROBE: NOT DETECTED
ENTERIC PATHOGEN COMMENT: NORMAL
G LAMBLIA AG STL QL IA: NEGATIVE
NOROV GI+II ORF1-ORF2 JNC STL QL NAA+PR: NOT DETECTED
O+P STL MICRO: NORMAL
O+P STL MICRO: NORMAL
RVA NSP5 STL QL NAA+PROBE: NOT DETECTED
SALMONELLA SP RPOD STL QL NAA+PROBE: NOT DETECTED
SHIGELLA SP+EIEC IPAH STL QL NAA+PROBE: NOT DETECTED
SPECIMEN SOURCE: NORMAL
SPECIMEN SOURCE: NORMAL
V CHOL+PARA RFBL+TRKH+TNAA STL QL NAA+PR: NOT DETECTED
Y ENTERO RECN STL QL NAA+PROBE: NOT DETECTED

## 2019-11-21 ENCOUNTER — MYC REFILL (OUTPATIENT)
Dept: FAMILY MEDICINE | Facility: OTHER | Age: 29
End: 2019-11-21

## 2019-11-21 ENCOUNTER — TELEPHONE (OUTPATIENT)
Dept: FAMILY MEDICINE | Facility: OTHER | Age: 29
End: 2019-11-21

## 2019-11-21 ENCOUNTER — TELEPHONE (OUTPATIENT)
Dept: GASTROENTEROLOGY | Facility: CLINIC | Age: 29
End: 2019-11-21

## 2019-11-21 ENCOUNTER — MYC MEDICAL ADVICE (OUTPATIENT)
Dept: FAMILY MEDICINE | Facility: OTHER | Age: 29
End: 2019-11-21

## 2019-11-21 DIAGNOSIS — G89.29 CHRONIC BILATERAL LOW BACK PAIN WITH LEFT-SIDED SCIATICA: ICD-10-CM

## 2019-11-21 DIAGNOSIS — M54.42 CHRONIC BILATERAL LOW BACK PAIN WITH LEFT-SIDED SCIATICA: ICD-10-CM

## 2019-11-21 DIAGNOSIS — G89.4 CHRONIC PAIN SYNDROME: ICD-10-CM

## 2019-11-21 RX ORDER — OXYCODONE HCL 5 MG/5 ML
5-10 SOLUTION, ORAL ORAL EVERY 6 HOURS PRN
Qty: 420 ML | Refills: 0 | Status: CANCELLED | OUTPATIENT
Start: 2019-11-21

## 2019-11-21 NOTE — TELEPHONE ENCOUNTER
Reason for Call:  Other call back    Detailed comments: Patient calling stating that her blood pressure is 155/106 and heart rate is 80. Patient would like to know if she should be seen. Patient states that sometimes her connection is bad and to leave a voicemail. Please advise.    Phone Number Patient can be reached at: Cell number on file:    Telephone Information:   Mobile [phone]       Best Time: Any    Can we leave a detailed message on this number? YES    Call taken on 11/21/2019 at 8:54 AM by Sally Haro

## 2019-11-22 NOTE — TELEPHONE ENCOUNTER
PA Initiation    Medication: hyoscyamine (ANASPAZ/LEVSIN) 0.125 MG tablet  Insurance Company: [employer] - Phone [phone] Fax [phone]  Pharmacy Filling the Rx: [id number] - [place], [place] - [address]  Filling Pharmacy Phone:    Filling Pharmacy Fax:    Start Date: 11/22/2019

## 2019-11-25 ENCOUNTER — MYC MEDICAL ADVICE (OUTPATIENT)
Dept: FAMILY MEDICINE | Facility: OTHER | Age: 29
End: 2019-11-25

## 2019-11-25 ENCOUNTER — MYC REFILL (OUTPATIENT)
Dept: FAMILY MEDICINE | Facility: OTHER | Age: 29
End: 2019-11-25

## 2019-11-25 DIAGNOSIS — M54.42 CHRONIC BILATERAL LOW BACK PAIN WITH LEFT-SIDED SCIATICA: ICD-10-CM

## 2019-11-25 DIAGNOSIS — G89.4 CHRONIC PAIN SYNDROME: ICD-10-CM

## 2019-11-25 DIAGNOSIS — G89.29 CHRONIC BILATERAL LOW BACK PAIN WITH LEFT-SIDED SCIATICA: ICD-10-CM

## 2019-11-25 DIAGNOSIS — J45.20 MILD INTERMITTENT ASTHMA WITHOUT COMPLICATION: ICD-10-CM

## 2019-11-25 RX ORDER — OXYCODONE HCL 5 MG/5 ML
5-10 SOLUTION, ORAL ORAL EVERY 6 HOURS PRN
Qty: 420 ML | Refills: 0 | Status: CANCELLED | OUTPATIENT
Start: 2019-11-25

## 2019-11-25 RX ORDER — OXYCODONE HCL 5 MG/5 ML
5-10 SOLUTION, ORAL ORAL EVERY 6 HOURS PRN
Qty: 420 ML | Refills: 0 | Status: SHIPPED | OUTPATIENT
Start: 2019-11-26 | End: 2019-12-08

## 2019-11-25 NOTE — TELEPHONE ENCOUNTER
I spoke to patient and informed her again that the rx for Oxycodone is not due until tomorrow. She said that she was expecting it today. Again I repeated that CDL wrote it was not due until tomorrow 11/26/19. Patient stated OK and hung up   Katy Sheikh CMA

## 2019-11-25 NOTE — TELEPHONE ENCOUNTER
Patient called in regards to having a terrible night. She is sweating really bad and not able to sleep at all. Would like a call back about this as well.

## 2019-11-25 NOTE — TELEPHONE ENCOUNTER
Prior Authorization Approval    Authorization Effective Date: 8/24/2019  Authorization Expiration Date: 11/21/2020  Medication: hyoscyamine (ANASPAZ/LEVSIN) 0.125 MG tablet  Approved Dose/Quantity:   Reference #:     Insurance Company: [employer] - Phone [phone] Fax [phone]  Expected CoPay:       CoPay Card Available:      Foundation Assistance Needed:    Which Pharmacy is filling the prescription (Not needed for infusion/clinic administered): [id number] - [place], [place] - [address]  Pharmacy Notified: Yes  Patient Notified: No

## 2019-11-25 NOTE — TELEPHONE ENCOUNTER
"I spoke with patient, who was looking for an update and recommendations.   She has been having \"terrible night sweats\" for two weeks and can't sleep because she's dripping with sweat and her sheets are soaked.   This has happened in the past, but the cause is unknown.   She is wondering if there is anything OTC that may help, if one of her medications is causing it, or if it could be menopause.   She reports that her mom had the same issue in her 30's when she started menopause.   Advised we could MyChart her with any recommendations as well as status on Oxycodone script.     Ines Roy, RN, BSN    "

## 2019-11-25 NOTE — TELEPHONE ENCOUNTER
Patient informed of below message.  She is scheduled for next week.  She is wondering if the benadryl can be sent over to [facility] as well?  Thank you.

## 2019-11-25 NOTE — TELEPHONE ENCOUNTER
Would need an appointment to discuss the sweating. I can't advise over phone/my chart.     Oxycodone not due until tomorrow. Rx e-prescribed with tomorrows date.     Zach Rosario PA-C  [facility] [place]

## 2019-11-26 ENCOUNTER — TRANSFERRED RECORDS (OUTPATIENT)
Dept: HEALTH INFORMATION MANAGEMENT | Facility: CLINIC | Age: 29
End: 2019-11-26

## 2019-11-26 RX ORDER — DIPHENHYDRAMINE HCL 50 MG
50-100 CAPSULE ORAL EVERY 6 HOURS PRN
Qty: 90 CAPSULE | Refills: 3 | Status: SHIPPED | OUTPATIENT
Start: 2019-11-26 | End: 2021-08-12
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
## 2019-12-03 ENCOUNTER — MYC MEDICAL ADVICE (OUTPATIENT)
Dept: FAMILY MEDICINE | Facility: OTHER | Age: 29
End: 2019-12-03

## 2019-12-03 ENCOUNTER — TELEPHONE (OUTPATIENT)
Dept: FAMILY MEDICINE | Facility: OTHER | Age: 29
End: 2019-12-03

## 2019-12-03 ENCOUNTER — OFFICE VISIT (OUTPATIENT)
Dept: FAMILY MEDICINE | Facility: OTHER | Age: 29
End: 2019-12-03
Payer: MEDICARE

## 2019-12-03 VITALS
DIASTOLIC BLOOD PRESSURE: 84 MMHG | OXYGEN SATURATION: 99 % | BODY MASS INDEX: 31.73 KG/M2 | HEART RATE: 88 BPM | SYSTOLIC BLOOD PRESSURE: 124 MMHG | WEIGHT: 172.4 LBS | TEMPERATURE: 97.6 F | HEIGHT: 62 IN | RESPIRATION RATE: 16 BRPM

## 2019-12-03 DIAGNOSIS — M51.16 LUMBAR DISC DISEASE WITH RADICULOPATHY: ICD-10-CM

## 2019-12-03 DIAGNOSIS — M54.2 CERVICALGIA: ICD-10-CM

## 2019-12-03 DIAGNOSIS — M54.42 CHRONIC BILATERAL LOW BACK PAIN WITH LEFT-SIDED SCIATICA: ICD-10-CM

## 2019-12-03 DIAGNOSIS — R61 NIGHT SWEATS: Primary | ICD-10-CM

## 2019-12-03 DIAGNOSIS — K50.118 CROHN'S DISEASE OF COLON WITH OTHER COMPLICATION (H): ICD-10-CM

## 2019-12-03 DIAGNOSIS — G89.4 CHRONIC PAIN SYNDROME: ICD-10-CM

## 2019-12-03 DIAGNOSIS — K21.9 GASTROESOPHAGEAL REFLUX DISEASE WITHOUT ESOPHAGITIS: ICD-10-CM

## 2019-12-03 DIAGNOSIS — G89.29 CHRONIC BILATERAL LOW BACK PAIN WITH LEFT-SIDED SCIATICA: ICD-10-CM

## 2019-12-03 PROCEDURE — 83970 ASSAY OF PARATHORMONE: CPT | Performed by: PHYSICIAN ASSISTANT

## 2019-12-03 PROCEDURE — T1013 SIGN LANG/ORAL INTERPRETER: HCPCS | Mod: U3 | Performed by: PHYSICIAN ASSISTANT

## 2019-12-03 PROCEDURE — 99214 OFFICE O/P EST MOD 30 MIN: CPT | Performed by: PHYSICIAN ASSISTANT

## 2019-12-03 PROCEDURE — 83002 ASSAY OF GONADOTROPIN (LH): CPT | Performed by: PHYSICIAN ASSISTANT

## 2019-12-03 PROCEDURE — 84403 ASSAY OF TOTAL TESTOSTERONE: CPT | Performed by: PHYSICIAN ASSISTANT

## 2019-12-03 RX ORDER — METHOCARBAMOL 750 MG/1
750 TABLET, FILM COATED ORAL 3 TIMES DAILY PRN
Qty: 60 TABLET | Refills: 3 | Status: SHIPPED | OUTPATIENT
Start: 2019-12-03 | End: 2020-03-02

## 2019-12-03 RX ORDER — LIDOCAINE HYDROCHLORIDE 20 MG/ML
15 SOLUTION OROPHARYNGEAL EVERY 4 HOURS PRN
Qty: 100 ML | Refills: 3 | Status: SHIPPED | OUTPATIENT
Start: 2019-12-03 | End: 2020-12-10

## 2019-12-03 RX ORDER — ALUMINA, MAGNESIA, AND SIMETHICONE 2400; 2400; 240 MG/30ML; MG/30ML; MG/30ML
15 SUSPENSION ORAL EVERY 4 HOURS PRN
Qty: 355 ML | Refills: 11 | Status: SHIPPED | OUTPATIENT
Start: 2019-12-03 | End: 2021-08-25

## 2019-12-03 ASSESSMENT — ANXIETY QUESTIONNAIRES
7. FEELING AFRAID AS IF SOMETHING AWFUL MIGHT HAPPEN: NOT AT ALL
5. BEING SO RESTLESS THAT IT IS HARD TO SIT STILL: SEVERAL DAYS
GAD7 TOTAL SCORE: 4
3. WORRYING TOO MUCH ABOUT DIFFERENT THINGS: NOT AT ALL
6. BECOMING EASILY ANNOYED OR IRRITABLE: SEVERAL DAYS
2. NOT BEING ABLE TO STOP OR CONTROL WORRYING: NOT AT ALL
GAD7 TOTAL SCORE: 4
4. TROUBLE RELAXING: SEVERAL DAYS
1. FEELING NERVOUS, ANXIOUS, OR ON EDGE: SEVERAL DAYS
7. FEELING AFRAID AS IF SOMETHING AWFUL MIGHT HAPPEN: NOT AT ALL
GAD7 TOTAL SCORE: 4

## 2019-12-03 ASSESSMENT — PATIENT HEALTH QUESTIONNAIRE - PHQ9
SUM OF ALL RESPONSES TO PHQ QUESTIONS 1-9: 7
10. IF YOU CHECKED OFF ANY PROBLEMS, HOW DIFFICULT HAVE THESE PROBLEMS MADE IT FOR YOU TO DO YOUR WORK, TAKE CARE OF THINGS AT HOME, OR GET ALONG WITH OTHER PEOPLE: NOT DIFFICULT AT ALL
SUM OF ALL RESPONSES TO PHQ QUESTIONS 1-9: 7

## 2019-12-03 ASSESSMENT — MIFFLIN-ST. JEOR: SCORE: 1455.25

## 2019-12-03 ASSESSMENT — PAIN SCALES - GENERAL: PAINLEVEL: SEVERE PAIN (6)

## 2019-12-03 NOTE — TELEPHONE ENCOUNTER
Patient is hearing impaired.  She was seen in clinic this morning for chronic back pain by [facility] (see encounter note).  Replied to patient's call via [email or web address].    Marianna Layne, RN, BSN

## 2019-12-03 NOTE — TELEPHONE ENCOUNTER
I spoke with pt who states her Back pain is worse then it was this morning. Temp 99.2, this morning 97.6. Pt states she is feeling like when she had a kidney infection. Bad cramps. Does not get her period. No blood in urine. No odor Urine is normal. Chilled. Did not have leg cramps this morning but it is really bad now. Forgot to get blood work done today. Was seen this morning in clinic.     Routing to [facility] to review. Pt is aware and will wait for a response via Billie Jimenez, RN, BSN

## 2019-12-03 NOTE — TELEPHONE ENCOUNTER
Reason for call:  Patient reporting a symptom    Symptom or request: back pain and low grade temp     Duration (how long have symptoms been present): was seen this morning and it is getting worse. Patient would like to be called ASAP    Have you been treated for this before? Yes    Additional comments:     Phone Number patient can be reached at:  [phone]    Best Time:      Can we leave a detailed message on this number:  NO    Call taken on 12/3/2019 at 12:47 PM by Atiya Borrego

## 2019-12-04 ASSESSMENT — PATIENT HEALTH QUESTIONNAIRE - PHQ9: SUM OF ALL RESPONSES TO PHQ QUESTIONS 1-9: 7

## 2019-12-04 ASSESSMENT — ANXIETY QUESTIONNAIRES: GAD7 TOTAL SCORE: 4

## 2019-12-04 NOTE — TELEPHONE ENCOUNTER
Per chart review, patient was seen at [facility] last night for her symptoms, left AMA.    Marianna Layne, RN, BSN

## 2019-12-06 ENCOUNTER — TRANSFERRED RECORDS (OUTPATIENT)
Dept: HEALTH INFORMATION MANAGEMENT | Facility: CLINIC | Age: 29
End: 2019-12-06

## 2019-12-06 ENCOUNTER — TELEPHONE (OUTPATIENT)
Dept: FAMILY MEDICINE | Facility: OTHER | Age: 29
End: 2019-12-06

## 2019-12-06 ENCOUNTER — NURSE TRIAGE (OUTPATIENT)
Dept: FAMILY MEDICINE | Facility: OTHER | Age: 29
End: 2019-12-06

## 2019-12-06 ENCOUNTER — MYC MEDICAL ADVICE (OUTPATIENT)
Dept: FAMILY MEDICINE | Facility: CLINIC | Age: 29
End: 2019-12-06

## 2019-12-06 NOTE — TELEPHONE ENCOUNTER
Sent Katy a [employer] message asking who prescribes her medical cannabis, waiting for a response back. Once we know who we are to call Maya from [facility] and inform her. [phone]    Also left a message on patients mothers phone to have the patient read her [employer] message and to respond back to us.

## 2019-12-06 NOTE — TELEPHONE ENCOUNTER
Please call [facility] at [phone]  Do you know who is prescribing Katy Medical Cannabis?

## 2019-12-06 NOTE — TELEPHONE ENCOUNTER
"Spoke with patient.  Was throwing up with drown brown color with dark color that might look like coffee. Liquiding looking with some chucks.  Has not eating anything red.  Has stomach has been going on for a few days. Also has a headache.  No weakness. No fever.  no sob.  Normal urination. 6/10 pain. Lower left side. Does wrap around to back.   Advised to go to ED for evaluation.    Her mom will drive her.    Iggy Bernstein, RN, BSN          Reason for Disposition    [1] Vomited blood AND [2] more than a few streaks of blood    (Exception: few streaks and only occurred once)    (Exception: swallowed blood from a nosebleed or cut in the mouth)    Additional Information    Negative: Shock suspected (e.g., cold/pale/clammy skin, too weak to stand, low BP, rapid pulse)    Negative: Difficult to awaken or acting confused (e.g., disoriented, slurred speech)    Negative: Unable to walk, or can only walk with assistance (e.g., requires support)    Negative: Fainted    Negative: [1] Vomited blood AND [2] large amount (example: \"a cup of blood\")    Negative: Rectal bleeding (i.e., bloody stool, blood in stool)    Negative: Sounds like a life-threatening emergency to the triager    Negative: Coughing up blood  (i.e., from lungs)    Negative: Weak, dizzy or lightheaded    Protocols used: VOMITING BLOOD-A-AH      "

## 2019-12-08 ENCOUNTER — MYC REFILL (OUTPATIENT)
Dept: FAMILY MEDICINE | Facility: OTHER | Age: 29
End: 2019-12-08

## 2019-12-08 DIAGNOSIS — M54.42 CHRONIC BILATERAL LOW BACK PAIN WITH LEFT-SIDED SCIATICA: ICD-10-CM

## 2019-12-08 DIAGNOSIS — G89.29 CHRONIC BILATERAL LOW BACK PAIN WITH LEFT-SIDED SCIATICA: ICD-10-CM

## 2019-12-08 DIAGNOSIS — G89.4 CHRONIC PAIN SYNDROME: ICD-10-CM

## 2019-12-09 ENCOUNTER — TELEPHONE (OUTPATIENT)
Dept: FAMILY MEDICINE | Facility: OTHER | Age: 29
End: 2019-12-09

## 2019-12-09 RX ORDER — OXYCODONE HCL 5 MG/5 ML
5-10 SOLUTION, ORAL ORAL EVERY 6 HOURS PRN
Qty: 420 ML | Refills: 0 | Status: SHIPPED | OUTPATIENT
Start: 2019-12-09 | End: 2019-12-12

## 2019-12-09 NOTE — TELEPHONE ENCOUNTER
Spoke to pharmacy, holding Rx until tomorrow 12/10 per note and other covering providers.   Anisha Beebe MA

## 2019-12-09 NOTE — TELEPHONE ENCOUNTER
Routing refill request to provider for review/approval because:  Drug not on the [facility] refill protocol     Last Written Prescription Date:  11/26/19  Last Fill Quantity: 420 ml,  # refills: 0   Last office visit: 12/3/2019 with prescribing provider:    Future Office Visit:   Next 5 appointments (look out 90 days)    Jan 03, 2020  2:00 PM CST  Return Visit with Bernie Wilson RD  [facility] [address] [place] [id number] [phone]         Ines Roy, RN, BSN

## 2019-12-09 NOTE — TELEPHONE ENCOUNTER
Reason for call:  Patient reporting a symptom    Symptom or request: patient calling complaining of stomach cramping and dry heaving    Duration (how long have symptoms been present): a few days    Have you been treated for this before? No    Additional comments: transferred to RN because they are deaf and had  on the phone    Phone Number patient can be reached at:  Cell number on file:    Telephone Information:   Mobile [phone]       Best Time:  any    Can we leave a detailed message on this number:  YES    Call taken on 12/9/2019 at 9:42 AM by Ruma Messer

## 2019-12-09 NOTE — TELEPHONE ENCOUNTER
Katy Islas is a 29 year old female who calls with abdominal cramps. .    NURSING ASSESSMENT:  Description:  I spoke with the pt who states she woke up at 230 and since then she has had cramps on and off. Going to the restroom a lot. Coughing hard, stomach is empty so just saliva. She was just out shoveling and feels better now. Pt is also waiting for her oxycodone prescription  Onset/duration:  ongoing  Precip. factors:  ED 12/6/2019  Improves/worsens symptoms:  Used the inhaler and feels better now    Allergies:   Allergies   Allergen Reactions     Iohexol Hives     Other reaction(s): Hives  IV contrast; patient states she tolerates contrast if she gets benadryl before  IV contrast; patient states she tolerates contrast if she gets benadryl before     Ativan [Lorazepam] Hives     At the IV site     Baclofen      hives     Bees Hives, Swelling and Difficulty breathing     Caffeine      Contrast Dye      Hives,   Updated 5/10/2016 CT Contrast.     Iodine Hives     Methocarbamol Swelling     Metoclopramide      Other reaction(s): Tremors  LW Reaction: shaking/sweating     Midazolam      Other reaction(s): Agitation     Monosodium Glutamate      Morphine Other (See Comments)     Difficulty with urination     Nsaids Other (See Comments)     GI BLEED x2     Other (Do Not Use) Other (See Comments)     Xanaflex- pt becomes disoriented and loses bladder control     Reglan [Metoclopramide Hcl] Other (See Comments)     shaking     Soma [Carisoprodol] Visual Disturbance     Sleep walking     Tizanidine      Topamax Other (See Comments)     Topamax [Topiramate] Nausea     Tingling  GI/Vomit     Tramadol      Severe Headache, Seizure Risk     Tylenol W/Codeine [Acetaminophen-Codeine] Nausea and Itching     Tylenol 3     Valium Other (See Comments)     Becomes aggressive and angry     Versed Other (See Comments)     Zolmitriptan      Makes face feel like its twitching     Droperidol Anxiety     Flu Virus Vaccine Rash       Arm swelling       RECOMMENDED DISPOSITION:  Home care advice - Since pt is feeling better continue to monitor. Callback is symptoms worsen again.   Will comply with recommendation: Yes  If further questions/concerns or if symptoms do not improve, worsen or new symptoms develop, call your PCP or [facility] as soon as possible.      Guideline used:  Telephone Triage Protocols for Nurses, Fifth Edition, Telma Jimenez, MAUREEN, RN

## 2019-12-09 NOTE — TELEPHONE ENCOUNTER
Patient responded to [employer] message, attempted to reach [facility] with [facility], she was not available but relayed message to one of their nurses.  Anisha Beebe MA

## 2019-12-10 ENCOUNTER — MYC MEDICAL ADVICE (OUTPATIENT)
Dept: FAMILY MEDICINE | Facility: OTHER | Age: 29
End: 2019-12-10

## 2019-12-11 ENCOUNTER — TELEPHONE (OUTPATIENT)
Dept: FAMILY MEDICINE | Facility: OTHER | Age: 29
End: 2019-12-11

## 2019-12-11 NOTE — TELEPHONE ENCOUNTER
Reason for Call:  Other appointment    Detailed comments: pt calling, would like to be worked in today in [place] as she is not feeling very well, and still wheezing. She will need an  for the appt. Please advise.     Phone Number Patient can be reached at: Home number on file [phone] (home)    Best Time: any     Can we leave a detailed message on this number? YES    Call taken on 12/11/2019 at 10:07 AM by Michelle Vega

## 2019-12-11 NOTE — TELEPHONE ENCOUNTER
"I spoke with patient via .   She states she is not feeling well, but doesn't want to come in and is just going to try and rest today.    She has developed a cough, but feels better after taking her \"red\" inhaler.  She feels tired.   She did schedule for tomorrow in case she is improved, but was advised to go to ED with any respiratory distress.   Patient agrees.     Next 5 appointments (look out 90 days)    Dec 12, 2019  1:00 PM CST  [facility] with Aura Rosario PA-C  [facility] [address] [place] [id number]  [phone]   Jan 03, 2020  2:00 PM CST  Return Visit with Bernie Wilson RD  [facility] ([facility]) [id number] [place] [id number]  [phone]        Ines Roy, RN, BSN    "

## 2019-12-11 NOTE — PROGRESS NOTES
"Subjective     Katy Islas is a 29 year old female who presents to clinic today for the following health issues:    HPI   Concern - fatigue   Onset: 3 days    Description:    so sleepy, got pulled over and they thought she was drunk. caffeine isnt even helping      Intensity: severe    Progression of Symptoms:  worsening    Accompanying Signs & Symptoms:  none    Previous history of similar problem:   none    Precipitating factors:   Worsened by: none    Alleviating factors:  Improved by: none  Therapies Tried and outcome: resting helps    - Bruise on right leg - 2 days ago     Doesn't remember hitting it on anything      All the way down the inside of right leg   - ED visits      Diarrhea, vomiting, etc.      Blood in vomit and [facility]      Has GI appointment next week on Wednesday     Oxycodone is making these symptoms worse   - Sleeping well at night      Constantly sleeping      Doesn't feel rested in the AM   - Drinking water      4 water bottles   - Migraines   - Dry mouth           - Pain bad      Talked with pain doctor      He will be requesting spinal cord stimulator trial      Talked about pain medications - he doesn't care who takes over      Asked about pain patch      Good and bad days             RN Triage note from yesterday  I spoke with patient via .   She states she is not feeling well, but doesn't want to come in and is just going to try and rest today.    She has developed a cough, but feels better after taking her \"red\" inhaler.  She feels tired.   She did schedule for tomorrow in case she is improved, but was advised to go to ED with any respiratory distress.   Patient agrees.         BP Readings from Last 3 Encounters:   12/12/19 108/64   12/03/19 124/84   11/19/19 120/80    Wt Readings from Last 3 Encounters:   12/12/19 83 kg (183 lb)   12/03/19 78.2 kg (172 lb 6.4 oz)   11/19/19 78.8 kg (173 lb 11.2 oz)           Review of Systems   ROS COMP: Constitutional, HEENT, " "cardiovascular, pulmonary, gi and gu systems are negative, except as otherwise noted.      Objective    /64   Pulse 84   Temp 98.4  F (36.9  C) (Temporal)   Resp 14   Ht 1.575 m (5' 2\")   Wt 83 kg (183 lb)   LMP 05/12/2019   SpO2 95%   BMI 33.47 kg/m    Body mass index is 33.47 kg/m .  Physical Exam   GENERAL APPEARANCE: healthy, alert and no distress  EYES: Eyes grossly normal to inspection, PERRLA, conjunctivae and sclerae without injection or discharge, EOM intact   HEENT: Mouth without lesions, mucus membranes moist, oropharynx clear   RESP: Lungs clear to auscultation - no rales, rhonchi or wheezes    CV: Regular rates and rhythm, normal S1 S2, no S3 or S4, no murmur, click or rub, no peripheral edema and peripheral pulses strong and symmetric bilaterally   MS: No musculoskeletal defects are noted and gait is age appropriate without ataxia   SKIN: No suspicious lesions or rashes, hydration status appears adeuqate with normal skin turgor   PSYCH: Alert and oriented x3; speech- coherent , normal rate and volume; able to articulate logical thoughts, able to abstract reason, no tangential thoughts, no hallucinations or delusions, mentation appears normal, Mood is euthymic. Affect is appropriate for this mood state and bright. Thought content is free of suicidal ideation, hallucinations, and delusions. Dress is adequate and upkept. Eye contact is good during conversation.       Diagnostic Test Results:  Labs reviewed in Epic  Results for orders placed or performed in visit on 12/12/19 (from the past 24 hour(s))   CBC with platelets   Result Value Ref Range    WBC 7.4 4.0 - 11.0 10e9/L    RBC Count 3.74 (L) 3.8 - 5.2 10e12/L    Hemoglobin 12.5 11.7 - 15.7 g/dL    Hematocrit 37.1 35.0 - 47.0 %    MCV 99 78 - 100 fl    MCH 33.4 (H) 26.5 - 33.0 pg    MCHC 33.7 31.5 - 36.5 g/dL    RDW 12.8 10.0 - 15.0 %    Platelet Count 259 150 - 450 10e9/L     *Note: Due to a large number of results and/or encounters for " the requested time period, some results have not been displayed. A complete set of results can be found in Results Review.           See orders pending in Epic     Assessment & Plan       ICD-10-CM    1. Other fatigue R53.83 CBC with platelets     Iron and iron binding capacity     Ferritin     Vitamin B12     Folate   2. Anxiety F41.9 CBC with platelets     Iron and iron binding capacity   3. Vitamin D deficiency E55.9 25 OH Vit D therapy monitoring   4. Hypertension goal BP (blood pressure) < 130/80 I10 Basic metabolic panel   5. Vomiting, intractability of vomiting not specified, presence of nausea not specified, unspecified vomiting type  R11.10 Iron and iron binding capacity     Ferritin   6. Excessive daytime sleepiness G47.19 SLEEP EVALUATION & MANAGEMENT REFERRAL - [facility] [place]  [phone] (Age 15 and up)   7. Chronic pain syndrome G89.4 oxyCODONE (ROXICODONE) 5 MG/5ML solution   8. Chronic bilateral low back pain with left-sided sciatica M54.42 oxyCODONE (ROXICODONE) 5 MG/5ML solution    G89.29        1-6.  - Patient with 3 days of extreme fatigue, reports got pulled over because police thought she was driving drunk but was just so tired   - Was in ED on 12/6/19 (6 days ago) for vomiting, continues to have complications of her Crohn's disease, has fairly constant vomiting and diarrhea   - Patient reports has appointment with her GI team next week   - Discussed this could cause anemia or electrolyte imbalances      Recommend labs      CBC today showed very slightly decreased RBCs but otherwise normal      Await rest of labs   - Also discussed could be tired because BPs today are very very low      On losartan 100 mg and metoprolol 200 mg daily     Recommend decrease Losartan to 50 mg & continue Metoprolol as is      Monitor BP closely      Report BP log to me Via [employer] in 1 week   - Patient requesting sleep study      Discussed unlikely to cause such an acute issue in 3  days, but will give her the referral      Patient reports very restless, tried Requip in the past and didn't help      Is on gabapentin at night as well and doesn't help   - Also discussed due to the vomiting and diarrhea could be a little dehydrated, though on exam normal skin turgor and mucus membranes moist      States drinks 4 water bottles per day, recommend increase to 6-8 per day   - Likely this is just acute due to her GI issues and will pass   -- Discussed warning signs that would warrant return to clinic/ED     7 & 8. Pain   - Patient reporting increased pain yet again   - States pain doctor told her he didn't care if he prescribed or I did but if she were to switch to the patch must come from PCP, also states that can't tolerate her liquid oxycodone and that she thinks this is causing her to vomit so much   - Discussed that I had spoken with Hunter Bowser NP, from [facility] in [place] and most of what she was telling me was not true, he said they would take over her pain medications but it was a process and first they needed a urine screen, she did complete this   - Discussed that I am not willing to switch her to Fentanyl patch and that the reason I sent her to pain management was for them to manage all of this   - She reports awaiting approval for trial of spinal cord stimulator   - Discussed opiate hyperalgesia as reason for her increased pain   - She then reported that she does have good days and bad days and sometimes would take 5 doses instead of 6, also was due for refill Monday 12/9 but states picked it up yesterday   - Discussed I will give her one more refill of six doses of 5 mg oxycodone due on 12/23/19 to get her through the holidays but no further   - We will recheck in 3-4 weeks prior to needing her next refill, by then she should have either transferred her care to the pain clinic or I will decrease to 5 doses and plan for a taper   - Patient is definitely exhibiting drug  seeking behavior   - See my previous notes for detailed history of patient back issues   - Liquid oxycodone (tapered off Fentanyl patch early fall)       No tablets due to GERD and GI issues per GI recommendation       Oxycodone 5-10 ml (5-10 mg) doses, 6/day, total 30 mg, MME 45      Had planned to decrease to 5 doses per day today but patient requesting increase as she did at our last visit on 12/3 as well   - Reviewed use and side effects including sedation, addiction, and constipation   - CSA 10/15/19  - Utox 10/15/19   -  website was down and not able to be reviewed, I did review care everywhere due to patient's multiple ED visits recently      She did have one incident when they told her no narcotics she left without getting labs drawn     The patient indicates understanding of these issues and agrees with the plan.    Due to language barrier, an  was present during the history-taking and subsequent discussion (and for part of the physical exam) with this patient.    Return in about 1 month (around 1/12/2020).    Aura Rosario PA-C  [facility]

## 2019-12-12 ENCOUNTER — OFFICE VISIT (OUTPATIENT)
Dept: FAMILY MEDICINE | Facility: OTHER | Age: 29
End: 2019-12-12
Payer: MEDICARE

## 2019-12-12 VITALS
WEIGHT: 183 LBS | OXYGEN SATURATION: 95 % | TEMPERATURE: 98.4 F | DIASTOLIC BLOOD PRESSURE: 58 MMHG | SYSTOLIC BLOOD PRESSURE: 104 MMHG | BODY MASS INDEX: 33.68 KG/M2 | RESPIRATION RATE: 14 BRPM | HEIGHT: 62 IN | HEART RATE: 84 BPM

## 2019-12-12 DIAGNOSIS — I10 HYPERTENSION GOAL BP (BLOOD PRESSURE) < 130/80: ICD-10-CM

## 2019-12-12 DIAGNOSIS — E55.9 VITAMIN D DEFICIENCY: ICD-10-CM

## 2019-12-12 DIAGNOSIS — R53.83 OTHER FATIGUE: Primary | ICD-10-CM

## 2019-12-12 DIAGNOSIS — M54.42 CHRONIC BILATERAL LOW BACK PAIN WITH LEFT-SIDED SCIATICA: ICD-10-CM

## 2019-12-12 DIAGNOSIS — G89.4 CHRONIC PAIN SYNDROME: ICD-10-CM

## 2019-12-12 DIAGNOSIS — R11.10 VOMITING, INTRACTABILITY OF VOMITING NOT SPECIFIED, PRESENCE OF NAUSEA NOT SPECIFIED, UNSPECIFIED VOMITING TYPE: ICD-10-CM

## 2019-12-12 DIAGNOSIS — F41.9 ANXIETY: ICD-10-CM

## 2019-12-12 DIAGNOSIS — G47.19 EXCESSIVE DAYTIME SLEEPINESS: ICD-10-CM

## 2019-12-12 DIAGNOSIS — G89.29 CHRONIC BILATERAL LOW BACK PAIN WITH LEFT-SIDED SCIATICA: ICD-10-CM

## 2019-12-12 LAB
ANION GAP SERPL CALCULATED.3IONS-SCNC: 4 MMOL/L (ref 3–14)
BUN SERPL-MCNC: 20 MG/DL (ref 7–30)
CALCIUM SERPL-MCNC: 9.1 MG/DL (ref 8.5–10.1)
CHLORIDE SERPL-SCNC: 105 MMOL/L (ref 94–109)
CO2 SERPL-SCNC: 30 MMOL/L (ref 20–32)
CREAT SERPL-MCNC: 1.09 MG/DL (ref 0.52–1.04)
ERYTHROCYTE [DISTWIDTH] IN BLOOD BY AUTOMATED COUNT: 12.8 % (ref 10–15)
FERRITIN SERPL-MCNC: 44 NG/ML (ref 12–150)
FOLATE SERPL-MCNC: 18.2 NG/ML
GFR SERPL CREATININE-BSD FRML MDRD: 68 ML/MIN/{1.73_M2}
GLUCOSE SERPL-MCNC: 81 MG/DL (ref 70–99)
HCT VFR BLD AUTO: 37.1 % (ref 35–47)
HGB BLD-MCNC: 12.5 G/DL (ref 11.7–15.7)
IRON SATN MFR SERPL: 20 % (ref 15–46)
IRON SERPL-MCNC: 58 UG/DL (ref 35–180)
MCH RBC QN AUTO: 33.4 PG (ref 26.5–33)
MCHC RBC AUTO-ENTMCNC: 33.7 G/DL (ref 31.5–36.5)
MCV RBC AUTO: 99 FL (ref 78–100)
PLATELET # BLD AUTO: 259 10E9/L (ref 150–450)
POTASSIUM SERPL-SCNC: 4.7 MMOL/L (ref 3.4–5.3)
RBC # BLD AUTO: 3.74 10E12/L (ref 3.8–5.2)
SODIUM SERPL-SCNC: 139 MMOL/L (ref 133–144)
TIBC SERPL-MCNC: 292 UG/DL (ref 240–430)
VIT B12 SERPL-MCNC: 278 PG/ML (ref 193–986)
WBC # BLD AUTO: 7.4 10E9/L (ref 4–11)

## 2019-12-12 PROCEDURE — 82306 VITAMIN D 25 HYDROXY: CPT | Performed by: PHYSICIAN ASSISTANT

## 2019-12-12 PROCEDURE — 83550 IRON BINDING TEST: CPT | Performed by: PHYSICIAN ASSISTANT

## 2019-12-12 PROCEDURE — 85027 COMPLETE CBC AUTOMATED: CPT | Performed by: PHYSICIAN ASSISTANT

## 2019-12-12 PROCEDURE — 82728 ASSAY OF FERRITIN: CPT | Performed by: PHYSICIAN ASSISTANT

## 2019-12-12 PROCEDURE — 80048 BASIC METABOLIC PNL TOTAL CA: CPT | Performed by: PHYSICIAN ASSISTANT

## 2019-12-12 PROCEDURE — 82607 VITAMIN B-12: CPT | Performed by: PHYSICIAN ASSISTANT

## 2019-12-12 PROCEDURE — 36415 COLL VENOUS BLD VENIPUNCTURE: CPT | Performed by: PHYSICIAN ASSISTANT

## 2019-12-12 PROCEDURE — 99214 OFFICE O/P EST MOD 30 MIN: CPT | Performed by: PHYSICIAN ASSISTANT

## 2019-12-12 PROCEDURE — 82746 ASSAY OF FOLIC ACID SERUM: CPT | Performed by: PHYSICIAN ASSISTANT

## 2019-12-12 PROCEDURE — 83540 ASSAY OF IRON: CPT | Performed by: PHYSICIAN ASSISTANT

## 2019-12-12 RX ORDER — OXYCODONE HCL 5 MG/5 ML
5-10 SOLUTION, ORAL ORAL EVERY 6 HOURS PRN
Qty: 420 ML | Refills: 0 | Status: SHIPPED | OUTPATIENT
Start: 2019-12-23 | End: 2020-01-02

## 2019-12-12 ASSESSMENT — PAIN SCALES - GENERAL: PAINLEVEL: SEVERE PAIN (6)

## 2019-12-12 ASSESSMENT — MIFFLIN-ST. JEOR: SCORE: 1503.33

## 2019-12-12 NOTE — PATIENT INSTRUCTIONS
1. Await rest of labs drawn today     2. Decrease Losartan to 50 mg & continue Metoprolol       Monitor blood pressure       Send me blood pressure log in 1 week     3. Call to set up sleep study     4. 2 more water bottles per day - recommend 6-8 bottles per day     5. Recheck 3-4 weeks

## 2019-12-13 ENCOUNTER — MYC MEDICAL ADVICE (OUTPATIENT)
Dept: FAMILY MEDICINE | Facility: OTHER | Age: 29
End: 2019-12-13

## 2019-12-13 LAB
DEPRECATED CALCIDIOL+CALCIFEROL SERPL-MC: <29 UG/L (ref 20–75)
VITAMIN D2 SERPL-MCNC: <5 UG/L
VITAMIN D3 SERPL-MCNC: 24 UG/L

## 2019-12-13 NOTE — TELEPHONE ENCOUNTER
No need for a cardiologist at this time. She should continue with plan as we discussed. If she is drinking a lot of caffeine that could make the BP go up. Pain also makes it go up. Make sure she is checking her BP after 5 minutes of rest and rechecking 5-10 min later if it is high.     Zach Rosario PA-C  [facility]

## 2019-12-13 NOTE — TELEPHONE ENCOUNTER
Spoke to patient on the phone, is really wanting to know what her labs were. 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 She is stating that she is really tired and BP has been high, see [employer] message.    Anisha Beebe MA

## 2019-12-13 NOTE — TELEPHONE ENCOUNTER
Labs have all come back normal so far but still awaiting vitamin D. Her kidney shows she is slightly dry so I recommend she increase her fluids to at least 60-80 ounces of water daily. Continue to monitor blood pressure. Will not make any medication changes at this time. Will forward to Zach as well.    Bob Monson PA-C

## 2019-12-13 NOTE — RESULT ENCOUNTER NOTE
Savana Burns    Your results were normal. Kidney function showing a little dehydration. Make sure you increase your water like I instructed.     The results are attached for your review.       Zach Rosario PA-C

## 2019-12-16 ENCOUNTER — MYC MEDICAL ADVICE (OUTPATIENT)
Dept: FAMILY MEDICINE | Facility: OTHER | Age: 29
End: 2019-12-16

## 2019-12-17 ENCOUNTER — MYC MEDICAL ADVICE (OUTPATIENT)
Dept: FAMILY MEDICINE | Facility: OTHER | Age: 29
End: 2019-12-17

## 2019-12-17 NOTE — PROGRESS NOTES
Subjective     Katy Islas is a 29 year old female who presents to clinic today for the following health issues:    History of Present Illness        Back Pain:  She presents for follow up of back pain. Patient's back pain is a chronic problem.  Location of back pain:  Right lower back and left lower back  Description of back pain: cramping and dull ache  Back pain spreads: left buttocks, right thigh, left thigh, right foot and left foot    Since patient first noticed back pain, pain is: always present, but gets better and worse  Does back pain interfere with her job:  Yes      Hypertension: She presents for follow up of hypertension.  She does check blood pressure  regularly outside of the clinic. Outside blood pressures have been over 140/90. She follows a low salt diet.     She eats 2-3 servings of fruits and vegetables daily.She consumes 0 sweetened beverage(s) daily.She is missing 2 dose(s) of medications per week.  She is not taking prescribed medications regularly due to remembering to take.       ED/UC Followup:    Facility:  [facility]   Date of visit: 12/14/19  Reason for visit: fissures   Current Status: no longer bleeding.   - Had surgery in the past   - Scared to have another one but wants referral     Got a message from surgeon about spinal cord stimulator, needs to call them     Lots of tooth pain. Ear pain  - Jaw going up to ear   - 2 holes in hole in molar   - Feels infected   - Low fever 99.4 today       Can Xyzal cause drowsiness?   - Alternate through the 3, stops working       Wants cardiology referral?  - 163/113 BP   - Last week decreased losartan to 50 mg   - Didn't help with fatigue   - BPs went up and up, headaches              Review of Systems   ROS COMP: Constitutional, HEENT, cardiovascular, pulmonary, gi and gu systems are negative, except as otherwise noted.      Objective    /86   Pulse 66   Temp 99.4  F (37.4  C) (Temporal)   Resp 16   Wt 81.5 kg (179 lb 9.6 oz)    SpO2 98%   BMI 32.85 kg/m    Body mass index is 32.85 kg/m .  Physical Exam   GENERAL APPEARANCE: healthy, alert and no distress  EYES: Eyes grossly normal to inspection, PERRLA, conjunctivae and sclerae without injection or discharge, EOM intact   HEENT: Tenderness with erythema and mild edema around lower left gingiva near first molar   RESP: Lungs clear to auscultation - no rales, rhonchi or wheezes    CV: Regular rates and rhythm, normal S1 S2, no S3 or S4, no murmur, click or rub, no peripheral edema and peripheral pulses strong and symmetric bilaterally   MS: No musculoskeletal defects are noted and gait is age appropriate without ataxia   SKIN: Scattered bruises of various sizes and stages of healing with large one located majority of inner right thigh, no other suspicious lesions or rashes, hydration status appears adeuqate with normal skin turgor   PSYCH: Alert and oriented x3; speech- coherent , normal rate and volume; able to articulate logical thoughts, able to abstract reason, no tangential thoughts, no hallucinations or delusions, mentation appears normal, Mood is euthymic. Affect is appropriate for this mood state and bright. Thought content is free of suicidal ideation, hallucinations, and delusions. Dress is adequate and upkept. Eye contact is good during conversation.       Diagnostic Test Results:  Labs reviewed in Epic  none         Assessment & Plan       ICD-10-CM    1. Anal fissure K60.2 GENERAL SURG ADULT REFERRAL   2. Seasonal allergic rhinitis due to pollen J30.1 fexofenadine (ALLEGRA) 180 MG tablet   3. Hypertension goal BP (blood pressure) < 130/80 I10 CARDIOLOGY EVAL ADULT REFERRAL   4. Family history of ischemic heart disease Z82.49 CARDIOLOGY EVAL ADULT REFERRAL   5. Dental abscess K04.7 clindamycin (CLEOCIN) 150 MG capsule   6. Family history of bleeding or clotting disorder Z83.2 CANCER RISK MGMT/CANCER GENETIC COUNSELING REFERRAL   7. S/P lumbar fusion Z98.1 acetaminophen  (TYLENOL) 500 MG tablet   8. Lumbar disc disease with radiculopathy M51.16 acetaminophen (TYLENOL) 500 MG tablet   9. Gastroesophageal reflux disease without esophagitis K21.9 pantoprazole (PROTONIX) 40 MG EC tablet   10. Easy bruising R23.8        - Patient seen in ED for anal fissures. Complicated by UC.   - Recommend appointment with general surgery and return to her GI doctor     - Signs of gingival infection on exam with possible abscess  - Advise antibiotic and follow up with dentist   - Discussed antibiotic use, duration, and side effects    - Patient with diffuse bruising, per mom clotting disorders run in the family   - Had normal PTT and INR in the ED   - Wants genetic counseling and further testing, this referral was given   - Discussed bruising could be due to all her medications as well     - HTN & Fatigue   - Was low at last visit and medication - Losartan was dropped to 50 mg from 100 mg due to severe fatigue   - Continues to have fatigue and BP went up and had headaches so returned to the 100 mg along with her metoprolol   - All labs for fatigue were normal   - Likely due to her mental health and/or medication regimen and/or pain and/or   - Requests cardiology evaluation due to large swings in BP and family history of cardiac disease, discussed this is not needed as we could try different medications for her BP that would be better tolerated but she declined and wanted referral   - Patient wondering if Zyzal could be causing fatigue, discussed anti-histamine and is possible   - Recommend trial of switching to another OTC antihistamine product      Wishes for me to prescribe Allegra, this was done, reviewed use and side effects  - Continues to have issues with night sweats since starting Depo, CBC was normal, recommend GYN provider to discuss DEPO and alternate birth control options     - Back pain   - Waiting to hear about spinal cord stimulator   - Working with [facility] in [place]    - Again asked me for change to fentanyl, discussed will not do this, needs to start tapering her oxycodone, declined that at this time   - Gave 2 week rx due next Monday previously   - See other notes for more detailed history of her back   - Did give her refill of tylenol        The patient indicates understanding of these issues and agrees with the plan.    Return in about 1 month (around 1/18/2020).    Due to language barrier, an  was present during the history-taking and subsequent discussion (and for part of the physical exam) with this patient.    Aura Rosario PA-C  [facility]

## 2019-12-18 ENCOUNTER — OFFICE VISIT (OUTPATIENT)
Dept: FAMILY MEDICINE | Facility: OTHER | Age: 29
End: 2019-12-18
Payer: MEDICARE

## 2019-12-18 VITALS
RESPIRATION RATE: 16 BRPM | DIASTOLIC BLOOD PRESSURE: 86 MMHG | TEMPERATURE: 99.4 F | HEART RATE: 66 BPM | WEIGHT: 179.6 LBS | SYSTOLIC BLOOD PRESSURE: 124 MMHG | OXYGEN SATURATION: 98 % | BODY MASS INDEX: 32.85 KG/M2

## 2019-12-18 DIAGNOSIS — Z83.2 FAMILY HISTORY OF BLEEDING OR CLOTTING DISORDER: ICD-10-CM

## 2019-12-18 DIAGNOSIS — I10 HYPERTENSION GOAL BP (BLOOD PRESSURE) < 130/80: ICD-10-CM

## 2019-12-18 DIAGNOSIS — Z82.49 FAMILY HISTORY OF ISCHEMIC HEART DISEASE: ICD-10-CM

## 2019-12-18 DIAGNOSIS — Z98.1 S/P LUMBAR FUSION: ICD-10-CM

## 2019-12-18 DIAGNOSIS — R23.3 EASY BRUISING: ICD-10-CM

## 2019-12-18 DIAGNOSIS — M51.16 LUMBAR DISC DISEASE WITH RADICULOPATHY: ICD-10-CM

## 2019-12-18 DIAGNOSIS — K21.9 GASTROESOPHAGEAL REFLUX DISEASE WITHOUT ESOPHAGITIS: ICD-10-CM

## 2019-12-18 DIAGNOSIS — J30.1 SEASONAL ALLERGIC RHINITIS DUE TO POLLEN: ICD-10-CM

## 2019-12-18 DIAGNOSIS — K04.7 DENTAL ABSCESS: ICD-10-CM

## 2019-12-18 DIAGNOSIS — K60.2 ANAL FISSURE: Primary | ICD-10-CM

## 2019-12-18 PROCEDURE — T1013 SIGN LANG/ORAL INTERPRETER: HCPCS | Mod: U3 | Performed by: PHYSICIAN ASSISTANT

## 2019-12-18 PROCEDURE — 99214 OFFICE O/P EST MOD 30 MIN: CPT | Performed by: PHYSICIAN ASSISTANT

## 2019-12-18 RX ORDER — PANTOPRAZOLE SODIUM 40 MG/1
40 TABLET, DELAYED RELEASE ORAL 2 TIMES DAILY
Qty: 180 TABLET | Refills: 3 | Status: SHIPPED | OUTPATIENT
Start: 2019-12-18 | End: 2020-10-20

## 2019-12-18 RX ORDER — ACETAMINOPHEN 500 MG
1000 TABLET ORAL 3 TIMES DAILY PRN
Qty: 100 TABLET | Refills: 3 | Status: SHIPPED | OUTPATIENT
Start: 2019-12-18 | End: 2021-02-03

## 2019-12-18 RX ORDER — CLINDAMYCIN HCL 150 MG
150 CAPSULE ORAL 3 TIMES DAILY
Qty: 30 CAPSULE | Refills: 0 | Status: SHIPPED | OUTPATIENT
Start: 2019-12-18 | End: 2020-01-07

## 2019-12-18 RX ORDER — FEXOFENADINE HCL 180 MG/1
180 TABLET ORAL DAILY
Qty: 90 TABLET | Refills: 3 | Status: SHIPPED | OUTPATIENT
Start: 2019-12-18 | End: 2021-05-05

## 2019-12-18 ASSESSMENT — PAIN SCALES - GENERAL: PAINLEVEL: MODERATE PAIN (4)

## 2019-12-18 NOTE — TELEPHONE ENCOUNTER
ONCOLOGY INTAKE: Records Information      APPT INFORMATION:  Referring provider:  Aura Rosario PA-C  Referring provider s clinic:  [facility]  Reason for visit/diagnosis:  Hypertension goal BP (blood pressure) < 130/80 [I10];Family history of ischemic heart disease [Z82.49]  Has patient been notified of appointment date and time?: Yes    RECORDS INFORMATION:  Were the records received with the referral (via [employer])? No,Internal Referral      Has patient been seen for any external appt for this diagnosis? No    If yes, where? NA

## 2019-12-18 NOTE — PATIENT INSTRUCTIONS
- [facility] [place] [place]  [phone]    - Appointment with GI and general surgery as scheduled     - Appointment with cardiology ([place], call below)

## 2019-12-19 ENCOUNTER — MYC MEDICAL ADVICE (OUTPATIENT)
Dept: FAMILY MEDICINE | Facility: OTHER | Age: 29
End: 2019-12-19

## 2019-12-19 ENCOUNTER — OFFICE VISIT (OUTPATIENT)
Dept: GASTROENTEROLOGY | Facility: CLINIC | Age: 29
End: 2019-12-19
Payer: MEDICARE

## 2019-12-19 VITALS
HEIGHT: 62 IN | WEIGHT: 177.2 LBS | HEART RATE: 73 BPM | DIASTOLIC BLOOD PRESSURE: 96 MMHG | BODY MASS INDEX: 32.61 KG/M2 | OXYGEN SATURATION: 98 % | SYSTOLIC BLOOD PRESSURE: 137 MMHG

## 2019-12-19 DIAGNOSIS — K21.9 GASTROESOPHAGEAL REFLUX DISEASE WITHOUT ESOPHAGITIS: Primary | ICD-10-CM

## 2019-12-19 DIAGNOSIS — R19.7 DIARRHEA, UNSPECIFIED TYPE: ICD-10-CM

## 2019-12-19 DIAGNOSIS — B37.31 CANDIDA VAGINITIS: Primary | ICD-10-CM

## 2019-12-19 DIAGNOSIS — K60.2 ANAL FISSURE: ICD-10-CM

## 2019-12-19 PROCEDURE — T1013 SIGN LANG/ORAL INTERPRETER: HCPCS | Mod: U3 | Performed by: PHYSICIAN ASSISTANT

## 2019-12-19 PROCEDURE — 99214 OFFICE O/P EST MOD 30 MIN: CPT | Performed by: PHYSICIAN ASSISTANT

## 2019-12-19 RX ORDER — FLUCONAZOLE 150 MG/1
150 TABLET ORAL ONCE
Qty: 3 TABLET | Refills: 0 | Status: SHIPPED | OUTPATIENT
Start: 2019-12-19 | End: 2020-01-07

## 2019-12-19 ASSESSMENT — PAIN SCALES - GENERAL: PAINLEVEL: NO PAIN (0)

## 2019-12-19 ASSESSMENT — MIFFLIN-ST. JEOR: SCORE: 1477.02

## 2019-12-19 NOTE — NURSING NOTE
"Katy Islas's goals for this visit include:   Chief Complaint   Patient presents with     RECHECK     Follow up; Patient reports GERD is better; states she is doing better than before but continues to have diarrhea with urgency and abdominal cramping; patient reports she was recently seen in the ER for rectal bleeding and was told she has fissures; referral was given for colo-rectal       She requests these members of her care team be copied on today's visit information: PCP    PCP: Aura Rosario    Referring Provider:  No referring provider defined for this encounter.    BP (!) 137/96 (BP Location: Left arm, Patient Position: Sitting, Cuff Size: Adult Regular)   Pulse 73   Ht 1.575 m (5' 2\")   Wt 80.4 kg (177 lb 3.2 oz)   SpO2 98%   BMI 32.41 kg/m      Do you need any medication refills at today's visit? No    Cathy Benitez LPN      "

## 2019-12-19 NOTE — PATIENT INSTRUCTIONS
Continue your medications for acid reflux.     Start taking Citrucel fiber 1 teaspoon with a glass of water daily. You can increase this to twice daily if needed.     Referral to colorectal surgical specialists for evaluation/treatment of anal fissures.     Follow up as needed.

## 2019-12-19 NOTE — TELEPHONE ENCOUNTER
Please assist patient in making appointment with GYN provider (I never specified a specific doctor).     Antibiotic can cause yeast. Oral Diflucan sent.     Zach Rosario PA-C  [facility]

## 2019-12-19 NOTE — PROGRESS NOTES
GASTROENTEROLOGY FOLLOW UP CLINIC VISIT    CC/REFERRING MD:    Aura Rosario    REASON FOR CONSULTATION:  RECHECK (Follow up)      HISTORY OF PRESENT ILLNESS:    Katy Islas is 29 year old female who presents for follow up of GERD and discuss recent diagnosis of anal fissures.     Patient here with  today.     She notes that her GERD symptoms are better at this time. She was seen in follow up last time and was dealing with a viral gastroenteritis that was exacerbating her symptoms. She is doing well currently on PPI daily. Denies naues aor vomiting. She does continue to have diarrhea, more so in the morning. She will typically have 2-3 loose Bm's in the morning with urgency and cramping. Overall this has improved since viral gastroenteritis as well. She recently developed brbpr and rectal pain. She went to the ER and was diagnosed with a small anal fissure. She has hx of anal fissure in the past that required procedure. She is currently doing better since starting epsom baths. No longer having rectal bleeding.       PREVIOUS ENDOSCOPY:    Colonoscopy 7/17/19  Impression:       - The examined portion of the ileum was normal.                             - No signs of IBD. Area of abnormality on the CT scan is normal endoscopically.                             - The transverse colon, ascending colon and cecum are normal.                             - One 4 mm polyp in the descending colon, removed with a cold snare. Resected and retrieved.                             - The rectum and sigmoid colon are normal.                             - Non-bleeding internal hemorrhoids. This may be the source of her rectal bleeding.                             - Anal papilla(e) were hypertrophied.     SPECIMEN(S):   A: Random colon biopsy   B: Descending colon biopsy     FINAL DIAGNOSIS:   A. RANDOM COLON BIOPSY:   - Colonic mucosa with no significant histologic abnormality   - Negative  for active inflammation and lymphocytic colitis     B. DESCENDING COLON POLYP, POLYPECTOMY:   - Hyperplastic polyp, negative for dysplasia     I have personally reviewed all specimens and/or slides, including the   listed special stains, and used them   with my medical judgement to determine or confirm the final diagnosis.     Electronically signed out by:     Victorino Bajwa M.D., [facility]     Upper Endoscopy 4/12/19  Impression:       - Esophagogastric landmarks identified.                             - Gastroesophageal flap valve classified as Hill Grade III (minimal fold, loose to endoscope, hiatal hernia likely).                             - Normal stomach.                             - Normal duodenal bulb, first portion of the duodenum and second portion of the duodenum.                             - Biopsies were taken with a cold forceps for Helicobacter pylori testing.                             - No findings to account for complaint of coffee ground emesis and hematemesis.       SPECIMEN(S):   Antral biopsy     FINAL DIAGNOSIS:   ANTRUM, BIOPSY:   - Gastric mucosa with no significant inflammation   - No H. pylori like organisms identified on routine staining   - Negative for intestinal metaplasia or dysplasia     I have personally reviewed all specimens and/or slides, including the listed special stains, and used them with my medical judgement to determine or confirm the final diagnosis.     Electronically signed out by:     Adrian Wagner M.D., [facility]             PERTINENT PAST MEDICAL HISTORY:    Past Medical History:   Diagnosis Date     Abdominal pain 10/31- 11/4/2005    [facility] admit for Crohn's     Allergic rhinitis, cause unspecified     Allergic rhinitis     Arthritis      C. difficile diarrhea     Past, no current diarrhea.     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, without mention of intractable epilepsy     pseudoseizures diagnosed after extensive neurologic eval     Migraine 07/21/12    D/C 07/22/12-[facility]     Migraine, unspecified, without mention of intractable migraine without mention of status migrainosus     Migraine     Mild intermittent asthma     mild intermittent     Mycoplasma infection in conditions classified elsewhere and of unspecified site      Other chronic pain     Back pain for 6 years     Renal disease     Kidney stones     Unspecified hearing loss     congenital hearing loss       PREVIOUS SURGERIES:   Past Surgical History:   Procedure Laterality Date     AS REMOVAL OF COCCYX  10/18/2017     C FUSION OF SACROILIAC JOINT  10/26/2018     COLONOSCOPY  7/1/2009    [facility].     COLONOSCOPY N/A 7/17/2019    Procedure: Colonoscopy, With Polypectomy And Biopsy;  Surgeon: Edwin Conde MD;  Location: [facility] OR     COLONOSCOPY WITH CO2 INSUFFLATION N/A 7/17/2019    Procedure: COLONOSCOPY, WITH CO2 INSUFFLATION;  Surgeon: Edwin Conde MD;  Location: [facility] OR     COMBINED ESOPHAGOSCOPY, GASTROSCOPY, DUODENOSCOPY (EGD) WITH CO2 INSUFFLATION N/A 4/12/2019    Procedure: COMBINED ESOPHAGOSCOPY, GASTROSCOPY, DUODENOSCOPY (EGD) WITH CO2 INSUFFLATION;  Surgeon: Edwin Conde MD;  Location: [facility] OR     ESOPHAGOSCOPY, GASTROSCOPY, DUODENOSCOPY (EGD), COMBINED N/A 4/12/2019    Procedure: Combined Esophagoscopy, Gastroscopy, Duodenoscopy (Egd), Biopsy Single Or Multiple;  Surgeon: Edwin Conde MD;  Location: [facility] OR     FUSION SPINE POSTERIOR  MINIMALLY INVASIVE ONE LEVEL N/A 2/23/2017    Procedure: FUSION SPINE POSTERIOR MINIMALLY INVASIVE ONE LEVEL;  L4-5 Oblique Lateral Lumbar Interbody Fusion   Epidural steroid injection.   Transpedicular Bone marrow aspiration;  Surgeon: Jeniffer Eugene MD;  Location: [facility] OR     HC COLONOSCOPY THRU STOMA WITH BIOPSY  10/29/2003    Impression is that of normal appearing colonoscopy, without evidence of rectal bleeding.     HC COLONOSCOPY THRU STOMA, DIAGNOSTIC  10/00    normal     HC COLONOSCOPY THRU STOMA, DIAGNOSTIC  Oct 2009    Dr López- normal     HC EEG AWAKE AND SLEEP      abnormal     HC MRI BRAIN W/O CONTRAST  12/00    normal     HC REMOVAL GALLBLADDER  8/5/2009    [facility], [facility].     HC UGI ENDOSCOPY DIAG W BIOPSY  11/11/09    Normal esophagus     HC UGI ENDOSCOPY, SIMPLE EXAM  7/00, 10/00    mild chronic esophagitis and duodenitis, neg H pylori     HC UGI ENDOSCOPY, SIMPLE EXAM  01/20/2005    Esophagogastroduodenoscopy, colonoscopy with biopsies.  [facility] UGI ENDOSCOPY, SIMPLE EXAM  7/1/2009    [facility], [facility].      UGI ENDOSCOPY, SIMPLE EXAM  11/11/2009    attempted upper GI, pt. could not tolerate procedure:[facility]     ORTHOPEDIC SURGERY  October 19,2011    diskectomy L4-L5       ALLERGIES:     Allergies   Allergen Reactions     Iohexol Hives     Other reaction(s): Hives  IV contrast; patient states she tolerates contrast if she gets benadryl before  IV contrast; patient states she tolerates contrast if she gets benadryl before     Ativan [Lorazepam] Hives     At the IV site     Baclofen      hives     Bees Hives, Swelling and Difficulty breathing     Caffeine      Contrast Dye      Hives,   Updated 5/10/2016 CT Contrast.     Iodine Hives     Methocarbamol Swelling     Metoclopramide      Other reaction(s): Tremors  LW Reaction: shaking/sweating     Midazolam      Other reaction(s): Agitation     Monosodium Glutamate      Morphine  Other (See Comments)     Difficulty with urination     Nsaids Other (See Comments)     GI BLEED x2     Other (Do Not Use) Other (See Comments)     Xanaflex- pt becomes disoriented and loses bladder control     Reglan [Metoclopramide Hcl] Other (See Comments)     shaking     Soma [Carisoprodol] Visual Disturbance     Sleep walking     Tizanidine      Topamax Other (See Comments)     Topamax [Topiramate] Nausea     Tingling  GI/Vomit     Tramadol      Severe Headache, Seizure Risk     Tylenol W/Codeine [Acetaminophen-Codeine] Nausea and Itching     Tylenol 3     Valium Other (See Comments)     Becomes aggressive and angry     Versed Other (See Comments)     Zolmitriptan      Makes face feel like its twitching     Droperidol Anxiety     Flu Virus Vaccine Rash      Arm swelling       PERTINENT MEDICATIONS:    Current Outpatient Medications:      acetaminophen (TYLENOL) 500 MG tablet, Take 2 tablets (1,000 mg) by mouth 3 times daily as needed for mild pain, Disp: 100 tablet, Rfl: 3     albuterol (PROAIR HFA/PROVENTIL HFA/VENTOLIN HFA) 108 (90 Base) MCG/ACT inhaler, Inhale 2 puffs into the lungs every 6 hours as needed for shortness of breath / dyspnea or wheezing, Disp: 3 Inhaler, Rfl: 3     albuterol (PROVENTIL) (2.5 MG/3ML) 0.083% neb solution, Take 1 vial (2.5 mg) by nebulization every 6 hours as needed for shortness of breath / dyspnea or wheezing, Disp: 50 vial, Rfl: 11     alum & mag hydroxide-simethicone (MAALOX ADVANCED MAX ST) 400-400-40 MG/5ML SUSP suspension, Take 15 mLs by mouth every 4 hours as needed for indigestion (mix with lidocaine), Disp: 355 mL, Rfl: 11     ARIPiprazole (ABILIFY) 15 MG tablet, Take 1 tablet (15 mg) by mouth At Bedtime, Disp: 90 tablet, Rfl: 3     aspirin (ASA) 81 MG tablet, Take 1 tablet (81 mg) by mouth daily, Disp: 90 tablet, Rfl: 3     azelastine (OPTIVAR) 0.05 % ophthalmic solution, Place 1 drop into both eyes 2 times daily, Disp: 6 mL, Rfl: 11     busPIRone (BUSPAR) 15 MG tablet,  Take 1 tablet (15 mg) by mouth 2 times daily, Disp: 60 tablet, Rfl: 5     clindamycin (CLEOCIN) 150 MG capsule, Take 1 capsule (150 mg) by mouth 3 times daily for 10 days, Disp: 30 capsule, Rfl: 0     cyclobenzaprine (FLEXERIL) 10 MG tablet, Take 1 tablet (10 mg) by mouth 3 times daily as needed for muscle spasms or other (back pain), Disp: 90 tablet, Rfl: 3     diphenhydrAMINE (BENADRYL) 50 MG capsule, Take 1-2 capsules ( mg) by mouth every 6 hours as needed for itching, allergies or sleep, Disp: 90 capsule, Rfl: 3     DULoxetine (CYMBALTA) 60 MG EC capsule, Take 1 capsule (60 mg) by mouth daily, Disp: 90 capsule, Rfl: 3     EPINEPHrine (EPIPEN/ADRENACLICK/OR ANY BX GENERIC EQUIV) 0.3 MG/0.3ML injection 2-pack, Inject 0.3 mLs (0.3 mg) into the muscle once as needed for anaphylaxis, Disp: 0.6 mL, Rfl: 3     fexofenadine (ALLEGRA) 180 MG tablet, Take 1 tablet (180 mg) by mouth daily, Disp: 90 tablet, Rfl: 3     fluconazole (DIFLUCAN) 150 MG tablet, Take 1 tablet (150 mg) by mouth once for 1 dose (may repeat in 3 days if symptoms persist), Disp: 3 tablet, Rfl: 0     gabapentin (NEURONTIN) 300 MG capsule, Take 1-3 capsules (300-900 mg) by mouth 3 times daily, Disp: 270 capsule, Rfl: 5     hydrocortisone (ANUSOL-HC) 2.5 % cream, Place rectally 2 times daily as needed for hemorrhoids, Disp: 30 g, Rfl: 1     hyoscyamine (ANASPAZ/LEVSIN) 0.125 MG tablet, Take 1 tablet (125 mcg) by mouth every 4 hours as needed for cramping, Disp: 60 tablet, Rfl: 1     lidocaine (XYLOCAINE) 2 % solution, Swish and swallow 15 mLs in mouth every 4 hours as needed for other (GERD) ; Max 8 doses/24 hour period. Mix with 15 mLs Maalox or Mylanta., Disp: 100 mL, Rfl: 3     losartan (COZAAR) 100 MG tablet, Take 1 tablet (100 mg) by mouth daily, Disp: 90 tablet, Rfl: 3     medical cannabis (Patient's own supply), (The purpose of this order is to document that the patient reports taking medical cannabis.  This is not a prescription, and is not  used to certify that the patient has a qualifying medical condition.), Disp: 0 Information only, Rfl: 0     medroxyPROGESTERone (DEPO-PROVERA) 150 MG/ML IM injection, Inject 1 mL (150 mg) into the muscle every 3 months, Disp: 3 mL, Rfl: 3     methocarbamol (ROBAXIN) 750 MG tablet, Take 1 tablet (750 mg) by mouth 3 times daily as needed for muscle spasms, Disp: 60 tablet, Rfl: 3     metoprolol succinate ER (TOPROL-XL) 200 MG 24 hr tablet, Take 1 tablet (200 mg) by mouth daily, Disp: 90 tablet, Rfl: 1     NONFORMULARY, Apply 30 mLs topically 4 times daily, Disp: , Rfl:      ondansetron (ZOFRAN-ODT) 8 MG ODT tab, Take 1 tablet (8 mg) by mouth every 8 hours as needed for nausea, Disp: 45 tablet, Rfl: 3     [START ON 12/23/2019] oxyCODONE (ROXICODONE) 5 MG/5ML solution, Take 5-10 mLs (5-10 mg) by mouth every 6 hours as needed for pain (max six 5 ML doses/day, total daily dose max 30 mg) (two week supply), Disp: 420 mL, Rfl: 0     pantoprazole (PROTONIX) 40 MG EC tablet, Take 1 tablet (40 mg) by mouth 2 times daily Take 30-60 minutes before a meal., Disp: 180 tablet, Rfl: 3     prazosin (MINIPRESS) 1 MG capsule, Take 1 mg by mouth At Bedtime, Disp: , Rfl: 5     rizatriptan (MAXALT-MLT) 5 MG ODT, Take 1-2 tablets (5-10 mg) by mouth at onset of headache for migraine May repeat in 2 hours. Max 6 tablets/24 hours., Disp: 18 tablet, Rfl: 3     sucralfate (CARAFATE) 1 GM tablet, Take 1 tablet (1 g) by mouth 4 times daily as needed (heartburn), Disp: 360 tablet, Rfl: 3     valACYclovir (VALTREX) 1000 mg tablet, 2 tabs at onset of mouth sores.  Repeat dose in 12 hours., Disp: , Rfl:     Current Facility-Administered Medications:      medroxyPROGESTERone (DEPO-PROVERA) injection 150 mg, 150 mg, Intramuscular, Q90 Days, Aura Rosario PA-C, 150 mg at 10/29/19 1004     medroxyPROGESTERone (DEPO-PROVERA) injection 150 mg, 150 mg, Intramuscular, Q90 Days, Aura Rosario PA-C, 150 mg at 08/05/19  "1131    FAMILY HISTORY:   Family History   Problem Relation Age of Onset     Gastrointestinal Disease Brother         severe Crohn's     Neurologic Disorder Brother         Seizures post head injury     Depression Brother      Substance Abuse Brother      Genitourinary Problems Father         kidney stones     Diabetes Father      Heart Disease Father         Open heart surgery     Breast Cancer Maternal Grandmother      Parkinsonism Maternal Grandmother      Cerebrovascular Disease Paternal Grandmother      Cancer Maternal Grandfather         Lung     Cardiovascular Paternal Grandfather         Heart Attack     Substance Abuse Mother         in recovery x1 year      Lung Cancer Paternal Uncle      Cancer Paternal Uncle      Lung Cancer Maternal Uncle           ROS:    No fevers or chills  No weight loss  No blurry vision, double vision or change in vision  No sore throat   No shortness of breath or wheezing  No chest pain or pressure  No arthralgias or myalgias  No odynophagia or dysphagia  No dysuria, frequency or urgency  No hot/cold intolerance or polyria    PHYSICAL EXAMINATION:  Constitutional: aaox3, cooperative, pleasant, not dyspneic/diaphoretic, no acute distress  Vitals reviewed: BP (!) 137/96 (BP Location: Left arm, Patient Position: Sitting, Cuff Size: Adult Regular)   Pulse 73   Ht 1.575 m (5' 2\")   Wt 80.4 kg (177 lb 3.2 oz)   SpO2 98%   BMI 32.41 kg/m     Wt:   Wt Readings from Last 2 Encounters:   12/19/19 80.4 kg (177 lb 3.2 oz)   12/18/19 81.5 kg (179 lb 9.6 oz)          Eyes: Sclera anicteric/injected  Respiratory: Unlabored breathing  Skin: no jaundice  Psych: Normal affect  MSK: Normal gait      PERTINENT STUDIES:   Most recent CBC:  Recent Labs   Lab Test 12/12/19  1320 08/21/19  0021   WBC 7.4 5.9   HGB 12.5 15.0   HCT 37.1 41.9    283     Most recent hepatic panel:  Recent Labs   Lab Test 09/02/19 08/30/19   ALT 44 30   AST 18 13*     Most recent creatinine:  Recent Labs   Lab " Test 12/12/19  1320 09/14/19   CR 1.09* 0.88       RADIOLOGY:      Interface, Rad Results In - 12/06/2019 10:59 PM CST  EXAM: CT ABDOMEN PELVIS WO ORAL WO IV CONTRAST  LOCATION: [facility]  DATE/TIME: 12/6/2019 10:48 PM    INDICATION: LLQ abd pain  COMPARISON: None.  TECHNIQUE: CT scan of the abdomen and pelvis was performed without oral or IV contrast. Multiplanar reformats were obtained. Dose reduction techniques were used.  CONTRAST: None.    FINDINGS:   LOWER CHEST: Lung bases clear.    HEPATOBILIARY: Cholecystectomy.    PANCREAS: Normal.    SPLEEN: Normal.    ADRENAL GLANDS: Normal.    KIDNEY/BLADDER: Punctate renal calculi.. Subcentimeter right renal hyperdensity Doppler] resident absence of IV contrast, possibly a cyst.    BOWEL: Normal caliber. Appendix.    LYMPH NODES: Normal.    VASCULATURE: Unremarkable.    PELVIC ORGANS: Normal.    OTHER: None.    MUSCULOSKELETAL: Postsurgical change left sacroiliac joint and lumbar spine.    IMPRESSION:   1.  No inflammatory change, bowel obstruction or abscess.  2.  Punctate nonobstructing renal calculi.      ASSESSMENT/PLAN:    Katy Islas is a 29 year old female who presents for follow up.     GERD - currently well controlled, continue current regimen.     Diarrhea - reviewed previous work up thus far. See previous notes for more detail. Recently exacerbated by gastroenteritis which has now improved. Residual symptoms are likely IBS. Recommended addition of fiber supplement such as citrucel fiber.     Anal fissure - refer to colorectal specialists. Offered ointment for anal fissure however pt believes she may have had a reaction in the past and is doing better with epsom bathss. Will avoid medication today and allow her to consult with colorectal.       Gastroesophageal reflux disease without esophagitis  Diarrhea, unspecified type  Anal fissure  - COLORECTAL SURGERY REFERRAL    Thank you for this consultation.  It was a pleasure to participate in the  care of this patient; please contact us with any further questions.  A total of 25 minutes, face to face, was spent with this patient, >50% of which was counseling regarding the above delineated issues.    This note was created with voice recognition software, and while reviewed for accuracy, typos may remain.     Junaid Pritchett PA-C  Gastroenterology   [facility]

## 2019-12-19 NOTE — TELEPHONE ENCOUNTER
Verbal OK to fill tomorrow instead of Monday due to travel. Please call pharmacy.     Zach Rosario PA-C  [facility]

## 2019-12-23 ENCOUNTER — TRANSFERRED RECORDS (OUTPATIENT)
Dept: HEALTH INFORMATION MANAGEMENT | Facility: CLINIC | Age: 29
End: 2019-12-23

## 2019-12-25 ENCOUNTER — MYC MEDICAL ADVICE (OUTPATIENT)
Dept: FAMILY MEDICINE | Facility: OTHER | Age: 29
End: 2019-12-25

## 2019-12-26 ENCOUNTER — TELEPHONE (OUTPATIENT)
Dept: FAMILY MEDICINE | Facility: OTHER | Age: 29
End: 2019-12-26

## 2019-12-26 NOTE — TELEPHONE ENCOUNTER
OK to schedule with Dr. Millan.     Next refill due on 1/7/20. She should make appointment to see me around then.    Zach Tomlinson-LOVE Moss  [facility]         Provider notes:   Fills   11/12/19  11/26/19  12/10/19  No report showing filled rx dated 12/23/19      Was early fill, should have been due 12/24/19       I gave verbal OK fill for Friday 12/20/19      Call pharmacy, they did confirm she picked up on 12/20/19     Canceled appointment scheduled 12/31/19 with GYN  Canceled appointment scheduled for 12/20/10 with general surgery

## 2019-12-27 ENCOUNTER — TELEPHONE (OUTPATIENT)
Dept: FAMILY MEDICINE | Facility: OTHER | Age: 29
End: 2019-12-27

## 2019-12-27 NOTE — TELEPHONE ENCOUNTER
"I spoke with Katy via .  Her BP is 160/84, , and she has a \"really bad\" headache.   She did not go to urgent care yesterday.   She believes her headache is from her blood pressure.   Attempted to reassure and recommended rest and fluids.   If she wants to be seen, she could go to Urgent Care.   Patient became upset and hung up on me.     Ines Roy, RN, BSN    "

## 2019-12-27 NOTE — TELEPHONE ENCOUNTER
Reason for call:  Patient reporting a symptom    Symptom or request: headache, heart rate 118, blood pressure 160/84    Duration (how long have symptoms been present): did not specify    Have you been treated for this before? n/a    Additional comments: sent to triage    Phone Number patient can be reached at:  Home number on file [phone] (home)    Best Time:  any    Can we leave a detailed message on this number:  YES    Call taken on 12/27/2019 at 3:40 PM by Souleymane Garcia

## 2019-12-27 NOTE — TELEPHONE ENCOUNTER
You placed a referral for patient to cardiology on 12/18/19.  Patient has not scheduled as of yet.      Please review and forward to team if follow up with the patient is needed.     Thank you!  Natacha/Clinic Referrals Dyad II

## 2019-12-29 ENCOUNTER — MYC MEDICAL ADVICE (OUTPATIENT)
Dept: FAMILY MEDICINE | Facility: OTHER | Age: 29
End: 2019-12-29

## 2019-12-29 ENCOUNTER — TRANSFERRED RECORDS (OUTPATIENT)
Dept: HEALTH INFORMATION MANAGEMENT | Facility: CLINIC | Age: 29
End: 2019-12-29

## 2019-12-29 LAB
ALT SERPL-CCNC: 21 U/L (ref 8–45)
AST SERPL-CCNC: 16 U/L (ref 5–41)
CREAT SERPL-MCNC: 0.83 MG/DL (ref 0.57–1.11)
GFR SERPL CREATININE-BSD FRML MDRD: >60 ML/MIN/1.73M(2)
GLUCOSE SERPL-MCNC: 86 MG/DL (ref 70–105)
POTASSIUM SERPL-SCNC: 4.4 MMOL/L (ref 3.5–5.1)

## 2019-12-31 ENCOUNTER — OFFICE VISIT (OUTPATIENT)
Dept: OBGYN | Facility: OTHER | Age: 29
End: 2019-12-31
Payer: MEDICARE

## 2019-12-31 DIAGNOSIS — K50.118 CROHN'S DISEASE OF COLON WITH OTHER COMPLICATION (H): ICD-10-CM

## 2019-12-31 DIAGNOSIS — Z30.42 DEPO-PROVERA CONTRACEPTIVE STATUS: ICD-10-CM

## 2019-12-31 DIAGNOSIS — R61 NIGHT SWEATS: Primary | ICD-10-CM

## 2019-12-31 DIAGNOSIS — K21.9 GASTROESOPHAGEAL REFLUX DISEASE WITHOUT ESOPHAGITIS: ICD-10-CM

## 2019-12-31 LAB
ESTRADIOL SERPL-MCNC: 20 PG/ML
FSH SERPL-ACNC: 12.6 IU/L
LH SERPL-ACNC: 3.1 IU/L
POTASSIUM SERPL-SCNC: 4.3 MMOL/L (ref 3.4–5.3)
PROLACTIN SERPL-MCNC: 6 UG/L (ref 3–27)
PTH-INTACT SERPL-MCNC: 23 PG/ML (ref 18–80)
TSH SERPL DL<=0.005 MIU/L-ACNC: 0.86 MU/L (ref 0.4–4)

## 2019-12-31 PROCEDURE — 84132 ASSAY OF SERUM POTASSIUM: CPT | Performed by: PHYSICIAN ASSISTANT

## 2019-12-31 PROCEDURE — 99203 OFFICE O/P NEW LOW 30 MIN: CPT | Performed by: OBSTETRICS & GYNECOLOGY

## 2019-12-31 PROCEDURE — T1013 SIGN LANG/ORAL INTERPRETER: HCPCS | Mod: U3 | Performed by: OBSTETRICS & GYNECOLOGY

## 2019-12-31 PROCEDURE — 82670 ASSAY OF TOTAL ESTRADIOL: CPT | Performed by: PHYSICIAN ASSISTANT

## 2019-12-31 PROCEDURE — 83520 IMMUNOASSAY QUANT NOS NONAB: CPT | Mod: 90 | Performed by: OBSTETRICS & GYNECOLOGY

## 2019-12-31 PROCEDURE — 36415 COLL VENOUS BLD VENIPUNCTURE: CPT | Performed by: OBSTETRICS & GYNECOLOGY

## 2019-12-31 PROCEDURE — 83001 ASSAY OF GONADOTROPIN (FSH): CPT | Performed by: PHYSICIAN ASSISTANT

## 2019-12-31 PROCEDURE — 83002 ASSAY OF GONADOTROPIN (LH): CPT | Performed by: PHYSICIAN ASSISTANT

## 2019-12-31 PROCEDURE — 84146 ASSAY OF PROLACTIN: CPT | Performed by: PHYSICIAN ASSISTANT

## 2019-12-31 PROCEDURE — 84403 ASSAY OF TOTAL TESTOSTERONE: CPT | Performed by: PHYSICIAN ASSISTANT

## 2019-12-31 PROCEDURE — 83970 ASSAY OF PARATHORMONE: CPT | Performed by: PHYSICIAN ASSISTANT

## 2019-12-31 PROCEDURE — 84443 ASSAY THYROID STIM HORMONE: CPT | Performed by: PHYSICIAN ASSISTANT

## 2019-12-31 PROCEDURE — 84144 ASSAY OF PROGESTERONE: CPT | Performed by: PHYSICIAN ASSISTANT

## 2019-12-31 NOTE — PROGRESS NOTES
SUBJECTIVE:       HPI: Katy Islas is a 29 year old  presents with  as a referral from Zach Rosario PA-C for worsening night sweats on Depo Provera. This has been going on for about 2 years now. They were originally  intermittent, but now persistent for last month. She has previously been recommended to stop Depo Provera, as she has been on this medication for about 7 years now. She is conflicted about this. Prior to Depo, menses were heavy and associated with cramping and back pain. Only notices spotting occasionally on the Depo; however, she did stop for short period of time last year, but quickly resumed therapy after resumption of heavy menses. She has previously tried an IUD, birth control pills and the NuvaRing, however, she did not like any of these methods. She is not interested in other options at this time other than hysterectomy. She has no desire to ever have children.    Mother and grandmother have a history of ovarian cancer. Mom was in her 50s and maternal grandmother was older as well. Grandmother also had breast cancer. Maternal aunt diagnosed with stage 4 breast cancer in her 50s. Katy is going to see genetic counseling on 2019. She is concerned about her risks for ovarian cancer. She states that her mom also started having hot flashes in her 30s, but did not go through menopause until 46yo.     She was recently seen in the ED for pelvic pain, concern about ovarian cysts. A pelvic ultrasound was ordered and no abnormalities found.     She denies vaginal discharge. She denies fevers/chills, nausea/vomiting, abdominal pain or bloating. Denies dysuria, hematuria, constipation or diarrhea.      Ob Hx:      Gyn Hx: No LMP recorded. Patient has had an injection.    Patient is not sexually active.    Last pap was 2017 NIL, No history of abnormal paps.    STI history denies  Using depo or other injectable for contraception. Using Depo 7 years  STD  testing offered?  Declined         reports that she has been smoking cigarettes. She has a 1.25 pack-year smoking history. She has never used smokeless tobacco.  Tobacco Cessation Action Plan: Information offered: Patient not interested at this time    Today's PHQ-2 Score:   PHQ-2 ( 1999 [employer]) 12/18/2019   Q1: Little interest or pleasure in doing things 1   Q2: Feeling down, depressed or hopeless 1   PHQ-2 Score 2   Q1: Little interest or pleasure in doing things Several days   Q2: Feeling down, depressed or hopeless Several days   PHQ-2 Score 2     Today's PHQ-9 Score:   PHQ-9 SCORE 12/3/2019   PHQ-9 Total Score -   PHQ-9 Total Score MyChart 7 (Mild depression)   PHQ-9 Total Score 7     Today's JULIETH-7 Score:   JULIETH-7 SCORE 12/3/2019   Total Score -   Total Score 4 (minimal anxiety)   Total Score 4       Problem list and histories reviewed & adjusted, as indicated.  Additional history: as documented.    Patient Active Problem List   Diagnosis     Hearing loss     Allergic rhinitis     Migraine     Benign neoplasm of skin of lower limb, including hip     Dysmenorrhea     Crohn's colitis (H)     Mild major depression (H)     Anxiety     Chronic pain     Bipolar disorder (H)     Marijuana abuse     Adjustment disorder with mixed anxiety and depressed mood     Herpes simplex virus infection     Gastroesophageal reflux disease without esophagitis     Bee sting allergy     Mild persistent asthma without complication     Chronic bilateral low back pain with left-sided sciatica     Vitamin D deficiency     Atypical mole     Lumbar disc disease with radiculopathy     S/P lumbar fusion     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)     Upper GI bleed - suspected     Tobacco use disorder     History of pseudoseizure     Iliotibial band syndrome affecting lower leg, right     Chondromalacia of right patellofemoral joint     Melanocytic nevus, unspecified location     Dysplastic skin lesion     Patellar maltracking,  right     Right anterior knee pain     Hypophosphatemia     Class 1 obesity due to excess calories without serious comorbidity with body mass index (BMI) of 32.0 to 32.9 in adult     Mild intermittent asthma     Cervicalgia     Bulge of cervical disc without myelopathy     Hypertension goal BP (blood pressure) < 130/80     Past Surgical History:   Procedure Laterality Date     AS REMOVAL OF COCCYX  10/18/2017     C FUSION OF SACROILIAC JOINT  10/26/2018     COL[facility]OSCOPY  7/1/[facility]     COLONOSCOPY N/A 7/17/2019    Procedure: Colonoscopy, With Polypectomy And Biopsy;  Surgeon: Edwin Conde MD;  Location: [facility] OR     COLONOSCOPY WITH CO2 INSUFFLATION N/A 7/17/2019    Procedure: COLONOSCOPY, WITH CO2 INSUFFLATION;  Surgeon: Edwin Conde MD;  Location: [facility] OR     COMBINED ESOPHAGOSCOPY, GASTROSCOPY, DUODENOSCOPY (EGD) WITH CO2 INSUFFLATION N/A 4/12/2019    Procedure: COMBINED ESOPHAGOSCOPY, GASTROSCOPY, DUODENOSCOPY (EGD) WITH CO2 INSUFFLATION;  Surgeon: Edwin Conde MD;  Location: [facility] OR     ESOPHAGOSCOPY, GASTROSCOPY, DUODENOSCOPY (EGD), COMBINED N/A 4/12/2019    Procedure: Combined Esophagoscopy, Gastroscopy, Duodenoscopy (Egd), Biopsy Single Or Multiple;  Surgeon: Edwin Conde MD;  Location: [facility] OR     FUSION SPINE POSTERIOR MINIMALLY INVASIVE ONE LEVEL N/A 2/23/2017    Procedure: FUSION SPINE POSTERIOR MINIMALLY INVASIVE ONE LEVEL;  L4-5 Oblique Lateral Lumbar Interbody Fusion   Epidural steroid injection.   Transpedicular Bone marrow aspiration;  Surgeon: Jeniffer Eugene MD;  Location: [facility] OR     HC COLONOSCOPY THRU STOMA WITH BIOPSY  10/29/2003    Impression is that of normal appearing colonoscopy, without evidence of rectal bleeding.     HC COLONOSCOPY THRU STOMA, DIAGNOSTIC  10/00    normal     HC COLONOSCOPY THRU STOMA, DIAGNOSTIC  Oct 2009    Dr López- normal     HC EEG AWAKE AND SLEEP      abnormal     HC MRI BRAIN W/O  CONTRAST  12/00    normal     HC REMOVAL GALLBLADDER  8/5/2009    [facility] [place]     HC UGI ENDOSCOPY DIAG W BIOPSY  11/11/09    Normal esophagus     HC UGI ENDOSCOPY, SIMPLE EXAM  7/00, 10/00    mild chronic esophagitis and duodenitis, neg H pylori     HC UGI ENDOSCOPY, SIMPLE EXAM  01/20/2005    Esophagogastroduodenoscopy, colonoscopy with biopsies.  [facility]     HC UGI ENDOSCOPY, SIMPLE EXAM  7/1/2009    [facility] [place].     HC UGI ENDOSCOPY, SIMPLE EXAM  11/11/2009    attempted upper GI, pt. could not tolerate procedure:[facility]     ORTHOPEDIC SURGERY  October 19,2011    diskectomy L4-L5      Social History     Tobacco Use     Smoking status: Current Every Day Smoker     Packs/day: 0.25     Years: 5.00     Pack years: 1.25     Types: Cigarettes     Smokeless tobacco: Never Used   Substance Use Topics     Alcohol use: Not Currently     Comment: Not since last July 2018      Problem (# of Occurrences) Relation (Name,Age of Onset)    Breast Cancer (1) Maternal Grandmother    Cancer (2) Maternal Grandfather: Lung, Paternal Uncle    Cardiovascular (1) Paternal Grandfather: Heart Attack    Cerebrovascular Disease (1) Paternal Grandmother    Depression (1) Brother    Diabetes (1) Father    Gastrointestinal Disease (1) Brother: severe Crohn's    Genitourinary Problems (1) Father: kidney stones    Heart Disease (1) Father: Open heart surgery    Lung Cancer (2) Paternal Uncle, Maternal Uncle    Neurologic Disorder (1) Brother: Seizures post head injury    Parkinsonism (1) Maternal Grandmother    Substance Abuse (2) Brother, Mother: in recovery x1 year             acetaminophen (TYLENOL) 500 MG tablet, Take 2 tablets (1,000 mg) by mouth 3 times daily as needed for mild pain  albuterol (PROAIR HFA/PROVENTIL HFA/VENTOLIN HFA) 108 (90 Base) MCG/ACT inhaler, Inhale 2 puffs into the lungs every 6 hours as needed for shortness of breath / dyspnea or  wheezing  albuterol (PROVENTIL) (2.5 MG/3ML) 0.083% neb solution, Take 1 vial (2.5 mg) by nebulization every 6 hours as needed for shortness of breath / dyspnea or wheezing  alum & mag hydroxide-simethicone (MAALOX ADVANCED MAX ST) 400-400-40 MG/5ML SUSP suspension, Take 15 mLs by mouth every 4 hours as needed for indigestion (mix with lidocaine)  ARIPiprazole (ABILIFY) 15 MG tablet, Take 1 tablet (15 mg) by mouth At Bedtime  aspirin (ASA) 81 MG tablet, Take 1 tablet (81 mg) by mouth daily  azelastine (OPTIVAR) 0.05 % ophthalmic solution, Place 1 drop into both eyes 2 times daily  busPIRone (BUSPAR) 15 MG tablet, Take 1 tablet (15 mg) by mouth 2 times daily  cyclobenzaprine (FLEXERIL) 10 MG tablet, Take 1 tablet (10 mg) by mouth 3 times daily as needed for muscle spasms or other (back pain)  diphenhydrAMINE (BENADRYL) 50 MG capsule, Take 1-2 capsules ( mg) by mouth every 6 hours as needed for itching, allergies or sleep  DULoxetine (CYMBALTA) 60 MG EC capsule, Take 1 capsule (60 mg) by mouth daily  EPINEPHrine (EPIPEN/ADRENACLICK/OR ANY BX GENERIC EQUIV) 0.3 MG/0.3ML injection 2-pack, Inject 0.3 mLs (0.3 mg) into the muscle once as needed for anaphylaxis  fexofenadine (ALLEGRA) 180 MG tablet, Take 1 tablet (180 mg) by mouth daily  gabapentin (NEURONTIN) 300 MG capsule, Take 1-3 capsules (300-900 mg) by mouth 3 times daily  hyoscyamine (ANASPAZ/LEVSIN) 0.125 MG tablet, Take 1 tablet (125 mcg) by mouth every 4 hours as needed for cramping  lidocaine (XYLOCAINE) 2 % solution, Swish and swallow 15 mLs in mouth every 4 hours as needed for other (GERD) ; Max 8 doses/24 hour period. Mix with 15 mLs Maalox or Mylanta.  losartan (COZAAR) 100 MG tablet, Take 1 tablet (100 mg) by mouth daily  medical cannabis (Patient's own supply), (The purpose of this order is to document that the patient reports taking medical cannabis.  This is not a prescription, and is not used to certify that the patient has a qualifying medical  condition.)  medroxyPROGESTERone (DEPO-PROVERA) 150 MG/ML IM injection, Inject 1 mL (150 mg) into the muscle every 3 months  methocarbamol (ROBAXIN) 750 MG tablet, Take 1 tablet (750 mg) by mouth 3 times daily as needed for muscle spasms  metoprolol succinate ER (TOPROL-XL) 200 MG 24 hr tablet, Take 1 tablet (200 mg) by mouth daily  NONFORMULARY, Apply 30 mLs topically 4 times daily  ondansetron (ZOFRAN-ODT) 8 MG ODT tab, Take 1 tablet (8 mg) by mouth every 8 hours as needed for nausea  oxyCODONE (ROXICODONE) 5 MG/5ML solution, Take 5-10 mLs (5-10 mg) by mouth every 6 hours as needed for pain (max six 5 ML doses/day, total daily dose max 30 mg) (two week supply)  pantoprazole (PROTONIX) 40 MG EC tablet, Take 1 tablet (40 mg) by mouth 2 times daily Take 30-60 minutes before a meal.  prazosin (MINIPRESS) 1 MG capsule, Take 1 mg by mouth At Bedtime  rizatriptan (MAXALT-MLT) 5 MG ODT, Take 1-2 tablets (5-10 mg) by mouth at onset of headache for migraine May repeat in 2 hours. Max 6 tablets/24 hours.  [] clindamycin (CLEOCIN) 150 MG capsule, Take 1 capsule (150 mg) by mouth 3 times daily for 10 days  [] fluconazole (DIFLUCAN) 150 MG tablet, Take 1 tablet (150 mg) by mouth once for 1 dose (may repeat in 3 days if symptoms persist)  hydrocortisone (ANUSOL-HC) 2.5 % cream, Place rectally 2 times daily as needed for hemorrhoids (Patient not taking: Reported on 2019)  sucralfate (CARAFATE) 1 GM tablet, Take 1 tablet (1 g) by mouth 4 times daily as needed (heartburn) (Patient not taking: Reported on 2019)  valACYclovir (VALTREX) 1000 mg tablet, 2 tabs at onset of mouth sores.  Repeat dose in 12 hours.    medroxyPROGESTERone (DEPO-PROVERA) injection 150 mg  medroxyPROGESTERone (DEPO-PROVERA) injection 150 mg      Allergies   Allergen Reactions     Iohexol Hives     Other reaction(s): Hives  IV contrast; patient states she tolerates contrast if she gets benadryl before  IV contrast; patient states she  tolerates contrast if she gets benadryl before     Ativan [Lorazepam] Hives     At the IV site     Baclofen      hives     Bees Hives, Swelling and Difficulty breathing     Caffeine      Contrast Dye      Hives,   Updated 5/10/2016 CT Contrast.     Iodine Hives     Methocarbamol Swelling     Metoclopramide      Other reaction(s): Tremors  LW Reaction: shaking/sweating     Midazolam      Other reaction(s): Agitation     Monosodium Glutamate      Morphine Other (See Comments)     Difficulty with urination     Nsaids Other (See Comments)     GI BLEED x2     Other (Do Not Use) Other (See Comments)     Xanaflex- pt becomes disoriented and loses bladder control     Reglan [Metoclopramide Hcl] Other (See Comments)     shaking     Soma [Carisoprodol] Visual Disturbance     Sleep walking     Tizanidine      Topamax Other (See Comments)     Topamax [Topiramate] Nausea     Tingling  GI/Vomit     Tramadol      Severe Headache, Seizure Risk     Tylenol W/Codeine [Acetaminophen-Codeine] Nausea and Itching     Tylenol 3     Valium Other (See Comments)     Becomes aggressive and angry     Versed Other (See Comments)     Zolmitriptan      Makes face feel like its twitching     Droperidol Anxiety     Flu Virus Vaccine Rash      Arm swelling       ROS:  10 Point review of systems negative other noted above in HPI    OBJECTIVE:     BP (P) 110/80   Pulse (P) 140   Wt (P) 80.9 kg (178 lb 4 oz)   BMI (P) 32.60 kg/m    Body mass index is 32.6 kg/m  (pended).      Gen: Alert, oriented, appropriately interactive, NAD  Chest: Symmetrical, unlabored breathing  Abdomen: soft, obese, non tender, non distended, no masses, no hernias. No inguinal lymphadenopathy.   External genitalia: no lesions; normal appearing external genitalia, bartholins glands, urethra, skenes glands  Vagina: no masses or lesions or discharge, normally rugated.  Cervix: no masses or lesions or discharge   Bimanual exam:   Nontender pelvic floor muscles  Urethra:  nontender   Bladder: nontender and without massess, well supported   Uterus: midline, anteverted, small, mobile  no masses, mild tenderness with exam  Adnexa: no masses or tenderness appreciated   No cervical motion tenderness  MSK: normal gait, symmetric movements UE & LE  Lower extremities: non-tender, no edema      In-Clinic Test Results:  No results found. However, due to the size of the patient record, not all encounters were searched. Please check Results Review for a complete set of results.     DATE:  2019 11:30 AM    CLINICAL DATA:  Pelvic pain.    ADDITIONAL CLINICAL DATA: Negative hCG.    COMPARISON:  None    TECHNIQUE:  Real-time transabdominal and transvaginal scanning was performed.  Transvaginal   imaging was necessary to better visualize the endometrium and ovaries.     FINDINGS:      UTERUS:  Normal.    UTERUS SIZE: 5 X 3.5 X 2.2 cm.    ENDOMETRIUM:  Normal in echotexture.  Endometrium measures 3 mm in thickness.    RIGHT OVARY:  Normal in size and echotexture.  The right ovary measures 3.1 X 2.3 X 1.5 cm.     Right ovary demonstrates normal blood flow. 1.4 cm dominant follicle noted in the right ovary.    LEFT OVARY:  Normal in size and echotexture.  The left ovary measures 2.4 X 1.8 X 0.9 cm.     Left ovary demonstrates normal blood flow.      FLUID:   No free fluid.    IMPRESSION:    1.  Normal pelvic ultrasound. No significant adnexal mass. No free fluid.    Electronically Signed By: Molly Benitez on 2019 12:22 PM    ASSESSMENT/PLAN:                                                      Katy Islas is a 29 year old  who presents today for night sweats on Depo Provera      ICD-10-CM    1. Night sweats R61 Follicle stimulating hormone     Lutropin     **TSH with free T4 reflex FUTURE anytime     Parathyroid Hormone Intact     Progesterone     Estradiol     Testosterone, total  Prolactin  Anti-mullerian Hormone   2. Depo-Provera contraceptive status Z30.42                   Worsening night sweats on Depo Provera, history 7 year use of medication. Baseline labs previously ordered by Primary care provider and pending. Not common side-effect with medication, would consider evaluating other medications as potential etiologies if labs wnl. Pelvic ultrasound already completed and wnl.     Katy is strongly considering a hysterectomy; however, as we do not know the etiology of her symptoms, it may not help her at all. We discussed all forms of hormonal therapy available, including LARC therapy (IUD, Nexplanon), Depo Provera, OCPs, condoms and permanent sterilization in form of tubal ligation or vasectomy. She, however, is not candidate for ROSEANNA's due to hypertension. We also discussed endometrial ablation and hysterectomy. She has no desire to have children, but also does not know if she has the time with her new job now. After discussing risks and benefits of options, she decided that she would like think further about her options and will let us know.    Strong family history ovarian cancer, genetic counseling referral already in place. Appointment 1/8/20.     Hypertension, with prior referral to cardiology. Katy has not yet made this appointment and was encouraged to do so in the near future. She was in agreement with this.      Monique Berumen, DO  [facility]

## 2020-01-01 ENCOUNTER — NURSE TRIAGE (OUTPATIENT)
Dept: NURSING | Facility: CLINIC | Age: 30
End: 2020-01-01

## 2020-01-01 LAB — PROGEST SERPL-MCNC: <0.2 NG/ML

## 2020-01-01 NOTE — TELEPHONE ENCOUNTER
I gave patient the message from below. She had no further questions.   
No openings in [facility] today or tomorrow.  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    Returned patient's call, left voicemail to call back (via deaf relay service).    Marianna Layne, EDYTAN, RN, PHN    
Reason for Call:  Today or tomorrow Appointment, Requested Provider:  anyone in [place]     PCP: Aura Rosario    Reason for visit: very bad cough. Asthma flare up    Duration of symptoms: over a week    Have you been treated for this in the past? No    Additional comments: patient has multiple allergies     Can we leave a detailed message on this number? Not Applicable    Phone number patient can be reached at: [phone]    Best Time: any    Call taken on 12/26/2019 at 11:45 AM by Ruma Messer  
Statement Selected

## 2020-01-01 NOTE — TELEPHONE ENCOUNTER
Patient calling. Using .    States she is having terrible period pain but there is no bleeding. She was seen in the office yesterday by Dr. Berumen but the pain started today.    She has taken 800 mg of ibuprofen with no relief.     Placed call to OB at [facility] (on-call coverage for [facility]). Dr. Berumen is not on call.    Protocol and care advice reviewed    Caller states understanding of the recommended disposition. Patient will go to [place] [facility] for examination and evaluation of symptoms.    Advised to call back if further questions or concerns        Reason for Disposition    [1] SEVERE pain (e.g., excruciating cramps) AND [2] worse than ever before AND [3] not improved with ibuprofen or naproxen    Additional Information    Negative: Sounds like a life-threatening emergency to the triager    Negative: Abdominal cramps unrelated to menstrual period    Negative: [1] SEVERE pain AND [2] pain clearly increases with coughing    Negative: Patient sounds very sick or weak to the triager    Negative: [1] SEVERE vaginal bleeding (i.e., soaking 2 pads or tampons per hour and present 2 or more hours) AND [2] worse than ever before    Negative: [1] MODERATE vaginal bleeding (i.e., soaking 1 pad or tampon per hour and present > 6 hours) AND [2] worse than ever before    Negative: Fever > 100.5 F (38.1 C)    Negative: Could be pregnant (e.g., missed last menstrual period)    Protocols used: ABDOMINAL PAIN - MENSTRUAL CRAMPS-A-AH

## 2020-01-02 ENCOUNTER — MYC MEDICAL ADVICE (OUTPATIENT)
Dept: FAMILY MEDICINE | Facility: OTHER | Age: 30
End: 2020-01-02

## 2020-01-02 ENCOUNTER — TELEPHONE (OUTPATIENT)
Dept: FAMILY MEDICINE | Facility: OTHER | Age: 30
End: 2020-01-02

## 2020-01-02 ENCOUNTER — TRANSFERRED RECORDS (OUTPATIENT)
Dept: HEALTH INFORMATION MANAGEMENT | Facility: CLINIC | Age: 30
End: 2020-01-02

## 2020-01-02 ENCOUNTER — MYC REFILL (OUTPATIENT)
Dept: FAMILY MEDICINE | Facility: OTHER | Age: 30
End: 2020-01-02

## 2020-01-02 DIAGNOSIS — M54.42 CHRONIC BILATERAL LOW BACK PAIN WITH LEFT-SIDED SCIATICA: ICD-10-CM

## 2020-01-02 DIAGNOSIS — G89.29 CHRONIC BILATERAL LOW BACK PAIN WITH LEFT-SIDED SCIATICA: ICD-10-CM

## 2020-01-02 DIAGNOSIS — G89.4 CHRONIC PAIN SYNDROME: ICD-10-CM

## 2020-01-02 DIAGNOSIS — J30.1 SEASONAL ALLERGIC RHINITIS DUE TO POLLEN: ICD-10-CM

## 2020-01-02 DIAGNOSIS — H10.13 ALLERGIC CONJUNCTIVITIS, BILATERAL: ICD-10-CM

## 2020-01-02 RX ORDER — ONDANSETRON 8 MG/1
8 TABLET, ORALLY DISINTEGRATING ORAL EVERY 8 HOURS PRN
Qty: 45 TABLET | Refills: 3 | Status: SHIPPED | OUTPATIENT
Start: 2020-01-02 | End: 2020-03-29

## 2020-01-02 RX ORDER — OXYCODONE HCL 5 MG/5 ML
5-10 SOLUTION, ORAL ORAL EVERY 6 HOURS PRN
Qty: 420 ML | Refills: 0 | Status: SHIPPED | OUTPATIENT
Start: 2020-01-02 | End: 2020-02-24

## 2020-01-02 NOTE — TELEPHONE ENCOUNTER
Patient would like to talk to  nurse regarding her cramps/bleeding and back pain- she stated she took 800 mg of ibuprofen last night and didn't give any relief.

## 2020-01-02 NOTE — TELEPHONE ENCOUNTER
Patient calling back on status, was informed had not been addressed by providers yet. Will route to covering providers to review  Katy Sheikh CMA

## 2020-01-02 NOTE — TELEPHONE ENCOUNTER
Patient called again to check on status. I let her know that we do need about 3 days prior for refills. This helps so she will not run out, if the PCP is out of the office, and holidays and vacations for PCP. She understands and will do this next time.

## 2020-01-02 NOTE — TELEPHONE ENCOUNTER
Katy Islas is a 29 year old female who calls with cramping.    NURSING ASSESSMENT:  Description:  I spoke with the pt (sign language interpretor) who states she is having bad cramps. Started yesterday. The pain is going to her back. Hurts more when sitting up in a chair. Lower abdomin and back. 30 minute Hot bath did not help. Also speaking with OB/GYN nurses. Only having a small amount of spotting. Has not had a period for over 1 year.   Onset/duration:  yesterday  Improves/worsens symptoms:  Ibuprofen did not help, took it last night and about 2 hours ago. Did take her muscle relaxer  Pain scale (0-10)   5-6/10  LMP/preg/breast feeding:  Not sexually active    Allergies:   Allergies   Allergen Reactions     Iohexol Hives     Other reaction(s): Hives  IV contrast; patient states she tolerates contrast if she gets benadryl before  IV contrast; patient states she tolerates contrast if she gets benadryl before     Ativan [Lorazepam] Hives     At the IV site     Baclofen      hives     Bees Hives, Swelling and Difficulty breathing     Caffeine      Contrast Dye      Hives,   Updated 5/10/2016 CT Contrast.     Iodine Hives     Methocarbamol Swelling     Metoclopramide      Other reaction(s): Tremors  LW Reaction: shaking/sweating     Midazolam      Other reaction(s): Agitation     Monosodium Glutamate      Morphine Other (See Comments)     Difficulty with urination     Nsaids Other (See Comments)     GI BLEED x2     Other (Do Not Use) Other (See Comments)     Xanaflex- pt becomes disoriented and loses bladder control     Reglan [Metoclopramide Hcl] Other (See Comments)     shaking     Soma [Carisoprodol] Visual Disturbance     Sleep walking     Tizanidine      Topamax Other (See Comments)     Topamax [Topiramate] Nausea     Tingling  GI/Vomit     Tramadol      Severe Headache, Seizure Risk     Tylenol W/Codeine [Acetaminophen-Codeine] Nausea and Itching     Tylenol 3     Valium Other (See Comments)     Becomes  aggressive and angry     Versed Other (See Comments)     Zolmitriptan      Makes face feel like its twitching     Droperidol Anxiety     Flu Virus Vaccine Rash      Arm swelling     RECOMMENDED DISPOSITION:  Homecare-Ibuprofen, hot pad and warm baths  Will comply with recommendation: Yes  If further questions/concerns or if symptoms do not improve, worsen or new symptoms develop, call your PCP or [facility] as soon as possible.      Guideline used: Menstrual problems  Telephone Triage Protocols for Nurses, Fifth Edition, Telma Jimenez, RN, RN

## 2020-01-02 NOTE — TELEPHONE ENCOUNTER
Patient is calling back with continuing symptoms. Declined speaking to triage nurse but will call back if symptoms get worse. She was mainly calling to find out if a covering provider can refill her prescriptions mentioned in the [employer] message below. Please advise.

## 2020-01-02 NOTE — TELEPHONE ENCOUNTER
There is a refill encounter which has been routed to covering providers. Will close this encounter, as prescriptions are being addressed in another encounter.     Also responded via [employer] asking if patient has contacted pharmacy about zofran refills.     Isabella Vazquez RN BSN

## 2020-01-02 NOTE — TELEPHONE ENCOUNTER
Unable to reach patient via phone. Left message to call clinic back at [phone] and ask for Women's Health.    Atiya Garcia RN

## 2020-01-03 ENCOUNTER — NURSE TRIAGE (OUTPATIENT)
Dept: NURSING | Facility: CLINIC | Age: 30
End: 2020-01-03

## 2020-01-03 ENCOUNTER — TELEPHONE (OUTPATIENT)
Dept: FAMILY MEDICINE | Facility: OTHER | Age: 30
End: 2020-01-03

## 2020-01-03 DIAGNOSIS — G89.29 CHRONIC BILATERAL LOW BACK PAIN WITH LEFT-SIDED SCIATICA: ICD-10-CM

## 2020-01-03 DIAGNOSIS — M54.42 CHRONIC BILATERAL LOW BACK PAIN WITH LEFT-SIDED SCIATICA: ICD-10-CM

## 2020-01-03 LAB — TESTOST SERPL-MCNC: 17 NG/DL (ref 8–60)

## 2020-01-03 RX ORDER — GABAPENTIN 300 MG/1
CAPSULE ORAL
Qty: 270 CAPSULE | Refills: 0 | Status: SHIPPED | OUTPATIENT
Start: 2020-01-03 | End: 2020-03-23

## 2020-01-03 NOTE — TELEPHONE ENCOUNTER
Routing refill request to provider for review/approval because:  Drug not on the [facility] refill protocol     Last Written Prescription Date:  2/22/19  Last Fill Quantity: 270,  # refills: 5   Last office visit: 12/18/2019 with prescribing provider:     Future Office Visit:   Next 5 appointments (look out 90 days)    Jan 03, 2020  2:00 PM CST  Return Visit with Bernie Wilson RD, ASL IS  [facility] [address] [place] [id number]  [phone]   Jan 07, 2020  1:00 PM CST  MyChart Short with Aura Rosario PA-C, ASL IS  [facility] [address] [place] [id number]  [phone]         Ines Roy, RN, BSN

## 2020-01-03 NOTE — PROGRESS NOTES
Subjective     Katy Islas is a 29 year old female who presents to clinic today for the following health issues:     HPI   ED/UC Followup:     Facility:  [facility]   Date of visit: 1/2/2020 and 1/6/2020  Reason for visit: Abdominal pain and blood   Current Status: lots of rectal bleeding, blood in vomit, medication not helping (zofran, tylenol, benadryl)   - This has been ongoing since yesterday  - Feels like there may be an anal infection.  - Has not been able to get in with specialist     Saw GI 12/19/19 - follow up as needed      Recommended to see colorectal specialist      Has to use depends     Lots of bleeding      Missed nutrition appointment      Yellow discharge in rectum, stinks, started today       Topical nitroglycerin caused heart to race       Epsom baths 3x/day       Rectal lidocaine helps some       Refuses topical nifedipine      - Vaginal bleeding with terrible cramps - stopped yesterday      Saw GYN     Recommend endometrial biopsy and then hysterectomy (will need pre-op)       Genetic counselor tomorrow      - Abilify to expensive at $2,000     Not going to [facility]      Counseling is enough, feels stable      - Pain medications      Was supposed to see Dr. Millan today but no interpretor for spinal cord stimulator      Doesn't think it works anyway     Just wants something to get her through until gets hysterectomy and spinal cord stimulator       - Sick of being in pain and can't take it anymore         Tired of going to the ER all the time                Review of Systems   ROS COMP: Constitutional, HEENT, cardiovascular, pulmonary, gi, pscyhiatric, neurological, GYN, and gu systems are negative, except as otherwise noted.           Objective       /72   Pulse 116   Temp 97.1  F (36.2  C) (Temporal)   Resp 16   Wt 83.3 kg (183 lb 9.6 oz)   LMP 01/01/2020   SpO2 94%   BMI 33.58 kg/m    Body mass index is 33.58 kg/m .  Physical Exam   GENERAL APPEARANCE: healthy, alert and no  "distress  EYES: Eyes grossly normal to inspection, PERRLA, conjunctivae and sclerae without injection or discharge, EOM intact   ABD: Declined  Rectal: Declined   BACK: Declined   MS: No musculoskeletal defects are noted and gait is age appropriate without ataxia   NEURO: Declined   SKIN: No suspicious lesions or rashes, hydration status appears adeuqate with normal skin turgor   PSYCH: Alert and oriented x3; speech- coherent , normal rate and volume; able to articulate logical thoughts, able to abstract reason, no tangential thoughts, no hallucinations or delusions, mentation appears normal, Mood is anxious. Affect is anxious. Thought content is free of suicidal ideation, hallucinations, and delusions. Dress is adequate and upkept. Eye contact is good during conversation.         Diagnostic Test Results:  Labs reviewed in Epic  none           Assessment & Plan        ICD-10-CM    1. Drug-seeking behavior Z76.5    2. Anal fissure K60.2    3. Adjustment disorder with mixed anxiety and depressed mood F43.23    4. Anxiety F41.9    5. Chronic pain syndrome G89.4    6. Chronic bilateral low back pain with left-sided sciatica M54.42     G89.29    7. Dysmenorrhea N94.6    8. Gastroesophageal reflux disease without esophagitis K21.9    9. Crohn's disease of colon with other complication (H) K50.118    10. Hypertension goal BP (blood pressure) < 130/80 I10    11. Bipolar disorder, current episode mixed, severe, without psychotic features (H) F31.63      - Patient clearly had an agenda today for her visit and that was to get different pain medications specifically Fentanyl patch   - She continued to insist that the oxycodone did nothing and that she just \"needed something to get her through\" until she can have spinal cord stimulator and hysterectomy   - Wants the Fentanyl patch and as I had previously discussed I was not going to prescribe that   - I discussed the following various ways that I was willing to help her with her " "various issues in these ways     1. Anal fissure  - Discussed needs colorectal surgery appointment as directed by GI specialist      This was set up for her for 2/17/2020   - Discussed in the meantime I would like to look at area if she is concerned for infection, she declined this and \"didn't want to mess with that area since it is so irritated\"   - For pain      Reviewed UTD guidelines      Recommend topical nitroglycerin - stated made her heart race            Patient has history of surgery for anal fissures       Recommend topical nifedipine - has never had, declined to do anything \"but see the specialist my GI doctor wanted me to see\"      Also recommended could have our general surgeon see her as soon as Monday 1/13/2020, but she repeated the same statement about seeing only the specialist      Has topical lidocaine at home - doesn't help   - Without being able to examine her, there is little else I can offer   - She stated the best thing was her warm Epsom baths and she will just continue that   - She also has Anusol at home     2. Cramps/GI Issue    - Unclear if due to chron's or GYN issues   - Doesn't look like her Crohn's has been discussed at GI appointment from 12/19/19, mainly focused on her anal fissures and GERD, was moved to PRN for recheck  - Recommend she return to GI specialist just to discuss her Crohn's as she continues to have GI issues   - Patient worried about her hemoglobin since having bleeding from rectum and occasionally when vomiting      Was checked in ED yesterday (via [facility]) and was normal range      Discussed no need at this point to check again, recommend re-check in 1 week   - Has Zofran, Levsin, and Bentyl on hand   - Takes Protonix and Carafate for GERD, also has PRN lidocaine solution   - She was supposed to be following with nutrition regarding her Crohn's (per recommendation from GI specialist) but needs to reschedule this as well     3. GYN issues   - Dysmenorrhea, " "cramping, night sweats, possibly due to Depo shot    - Full work up of labs and ultrasound, saw GYN provider who recommended genetic counseling for ovarian cancer risk, I had previously set up for genetic counseling due to family history of bleeding disorders and this is scheduled for tomorrow    - Reviewed GYN note with patient     Provider recommended endometrial biopsy prior to consideration of a hysterectomy which patient really wants (see above)       We assisted patient in scheduling this for later this week   - There was also a plan to look into medications for cause of her symptoms as well as consideration of something like ablation prior to hysterectomy as she is only 29 years old     As above, cramping could be due to her Crohn's and GI issues     4. Mental health   - Stated stopped going to [facility] because mood felt stable, continues counseling (had to find a counselor that speaks ASL)   - Discussed that she needs a psychiatrist   - Stated that Abilify was $2,000 due to lapse in insurance     I asked patient if we needed to switch medications but she stated no, I offered increase in the medication but she declined, I asked her what we needed to do then regarding this issue and she stated \"nothing, I have enough to get me through until my new insurance card comes\"     5. Back pain   - Worsened due to cramping in abdomen   - Was supposed to have appointment with surgeon today but there was no interpretor available   - Stopped going to [facility] since they didn't do spinal cord stimulator   - Discussed continue on muscle relaxers (flexeril), Cymbalta, &  Gabapentin  - She also has medical marijuana, reports infrequent use due to cost   - See below for further discussion on pain and opioid-induced hyperalgesia     6. HTN   - Stable on Metoprolol & Losartan   - Continues to have elevated pulses, likely due to pain and mental health/anxiety      - We also had a long discussion on how " "she needs to take control of her health and be her own advocate      She insisted that she is and I mentioned that she had a referral to Colorectal specialist since 12/19/19 and has yet to schedule appointment, she had insisted on cardiology evaluation for her HTN (which I did not fully agree with but gave her anyway) and she didn't schedule that either      She was previously following with [facility] in [place] but stopped going to psychiatrist      She was seeing Hunter Bowser NP, with [facility] but wants spinal cord stimulator so returned to her spinal surgeon Dr. Millan, discussed that I had spoke with Hunter Bowser and they did have a plan for her pain and that he was in agreement that she did not need the Fentanyl patch      Was also supposed to do a sleep study which has never been completed      - Patient continued to decline all interventions I offered and continued to get angrier throughout the visit   - As above, kept repeating \"I just want something to get me through until after I have my spinal cord stimulator and hysterectomy\" \"I am miserable\" \"Nothing works, nothing helps\"   - Discussed that I believe she is experiencing opioid induced hyperalgesia      I showed her a chart about this that her pain is increased due to long term use of opioids      I recommended a taper and I think this would actually help her pain      I recommended we decrease by 1 dose (5 mg oxycodone, taking 6/day) every 2 weeks until off to avoid withdrawals, which she has had in the past      Patient then stated \"I hate it and it doesn't work anyway (oxycodone) so I am just going to stop it\" discussed risks with this including withdrawal      Offered clonidine as this has helped patient in the past but she declined   - Patient increasingly would refuse to look at provider or interpretor to continue discussion      She stated \"nobody is helping me, nothing helps, I'm just going to stop everything\"      Again, " "re-iterated that I had offered her a lot of things to help her         \"Forget it, I'm done, I'm done with everything\"        I went out to get her AVS so we could review her appointments since our discussion was going no where and I had been in the room 1 hour. When I was returning to room patient was leaving the exam room. I attempted to give her AVS and she flippantly pushed it back at me, put her hands in the air indicating she was refusing to take it, and stormed past provider and out of the clinic.        Due to language barrier, an  was present during the history-taking and subsequent discussion (and for part of the physical exam) with this patient.        Return in about 2 weeks (around 1/21/2020) for recheck for continued taper plan, with specialists as planned, and 1 week for CBC recheck.      A total of 60 minutes was spent with the patient today, with greater than 50% of the visit involving counseling and coordination of care.        Aura Rosario PA-C  [facility]       "

## 2020-01-03 NOTE — TELEPHONE ENCOUNTER
Prior Authorization Retail Medication Request    Medication/Dose: ondansetron (ZOFRAN-ODT) 8 MG ODT tabICD code (if different than what is on RX):    Previously Tried and Failed:    Rationale:      Insurance Name:    Insurance ID:        Pharmacy Information (if different than what is on RX)  Name:  [facility]  Phone:  [phone]

## 2020-01-04 NOTE — TELEPHONE ENCOUNTER
#[id number] with [facility] calls and says that Katy says that she has stabbing pain all over her body.  says that pt. Feels as if she is being stabbed by a knife. Pt. Wants to know if her Dr., Dr. Berumen, is working at the hospital. RN called the [facility] and was told that there are no OB providers in house. RN told this to pt. And offered to page the OB Dr. who is on-call but pt. Refused. RN told pt. That according to nursing judgement, pt. Should go to the ER with her stabbing pain. Pt. Says that she will not do this and is alone and has no family here. Pt. Says that she is worried about her dog and will not leave the dog alone. Pt. Refuses to call 911. Pt. Says that why would she go back to an ER? Pt. Says that she was at [facility] last week and the DrRito Did not find anything wrong. Pt. Then hung the phone up, thus ending this call.   Reason for Disposition    [1] Follow-up call to recent contact AND [2] information only call, no triage required    Additional Information    Negative: [1] Caller is not with the adult (patient) AND [2] reporting urgent symptoms    Negative: Lab result questions    Negative: Medication questions    Negative: Caller can't be reached by phone    Negative: Caller has already spoken to PCP or another triager    Negative: RN needs further essential information from caller in order to complete triage    Negative: Requesting regular office appointment    Negative: [1] Caller requesting NON-URGENT health information AND [2] PCP's office is the best resource    Negative: Health Information question, no triage required and triager able to answer question    Negative: General information question, no triage required and triager able to answer question    Negative: Question about upcoming scheduled test, no triage required and triager able to answer question    Negative: [1] Caller is not with the adult (patient) AND [2] probable NON-URGENT  symptoms    Protocols used: INFORMATION ONLY CALL-A-AH

## 2020-01-06 ENCOUNTER — TRANSFERRED RECORDS (OUTPATIENT)
Dept: HEALTH INFORMATION MANAGEMENT | Facility: CLINIC | Age: 30
End: 2020-01-06

## 2020-01-06 LAB
ALT SERPL-CCNC: 27 U/L (ref 8–45)
AST SERPL-CCNC: 23 U/L (ref 5–41)
CREAT SERPL-MCNC: 0.97 MG/DL (ref 0.57–1.11)
GFR SERPL CREATININE-BSD FRML MDRD: >60 ML/MIN/1.73M(2)
GLUCOSE SERPL-MCNC: 82 MG/DL (ref 70–105)
POTASSIUM SERPL-SCNC: 4 MMOL/L (ref 3.5–5.1)

## 2020-01-06 NOTE — TELEPHONE ENCOUNTER
Central Prior Authorization Team   Phone: [phone]      PA Initiation    Medication: ondansetron (ZOFRAN-ODT) 8 MG ODT tab-Initiated  Insurance Company: Medicare Blue - Phone [phone] Fax [phone]  Pharmacy Filling the Rx: [id number] - [place], [place] - [address]  Filling Pharmacy Phone: [phone]  Filling Pharmacy Fax:    Start Date: 1/6/2020

## 2020-01-06 NOTE — TELEPHONE ENCOUNTER
Prior Authorization Approval    Authorization Effective Date: 1/1/2020  Authorization Expiration Date: 1/5/2021  Medication: ondansetron (ZOFRAN-ODT) 8 MG ODT tab-APPROVED  Approved Dose/Quantity:   Reference #:     Insurance Company: Medicare Blue - Phone [phone] Fax [phone]  Expected CoPay:       CoPay Card Available:      Foundation Assistance Needed:    Which Pharmacy is filling the prescription (Not needed for infusion/clinic administered): [id number] - [place], [place] - [address] [facility] [address]  Pharmacy Notified: Yes  Patient Notified: No    Pharmacy will notify patient when medication is ready.

## 2020-01-07 ENCOUNTER — MYC MEDICAL ADVICE (OUTPATIENT)
Dept: FAMILY MEDICINE | Facility: OTHER | Age: 30
End: 2020-01-07

## 2020-01-07 ENCOUNTER — OFFICE VISIT (OUTPATIENT)
Dept: FAMILY MEDICINE | Facility: OTHER | Age: 30
End: 2020-01-07
Payer: MEDICARE

## 2020-01-07 VITALS
SYSTOLIC BLOOD PRESSURE: 118 MMHG | DIASTOLIC BLOOD PRESSURE: 72 MMHG | WEIGHT: 183.6 LBS | TEMPERATURE: 97.1 F | OXYGEN SATURATION: 94 % | HEART RATE: 116 BPM | RESPIRATION RATE: 16 BRPM | BODY MASS INDEX: 33.58 KG/M2

## 2020-01-07 DIAGNOSIS — M54.42 CHRONIC BILATERAL LOW BACK PAIN WITH LEFT-SIDED SCIATICA: ICD-10-CM

## 2020-01-07 DIAGNOSIS — F41.9 ANXIETY: ICD-10-CM

## 2020-01-07 DIAGNOSIS — G89.4 CHRONIC PAIN SYNDROME: ICD-10-CM

## 2020-01-07 DIAGNOSIS — N94.6 DYSMENORRHEA: ICD-10-CM

## 2020-01-07 DIAGNOSIS — F31.63 BIPOLAR DISORDER, CURRENT EPISODE MIXED, SEVERE, WITHOUT PSYCHOTIC FEATURES (H): ICD-10-CM

## 2020-01-07 DIAGNOSIS — K60.2 ANAL FISSURE: ICD-10-CM

## 2020-01-07 DIAGNOSIS — K50.118 CROHN'S DISEASE OF COLON WITH OTHER COMPLICATION (H): ICD-10-CM

## 2020-01-07 DIAGNOSIS — G89.29 CHRONIC BILATERAL LOW BACK PAIN WITH LEFT-SIDED SCIATICA: ICD-10-CM

## 2020-01-07 DIAGNOSIS — I10 HYPERTENSION GOAL BP (BLOOD PRESSURE) < 130/80: ICD-10-CM

## 2020-01-07 DIAGNOSIS — K21.9 GASTROESOPHAGEAL REFLUX DISEASE WITHOUT ESOPHAGITIS: ICD-10-CM

## 2020-01-07 DIAGNOSIS — F43.23 ADJUSTMENT DISORDER WITH MIXED ANXIETY AND DEPRESSED MOOD: ICD-10-CM

## 2020-01-07 DIAGNOSIS — Z76.5 DRUG-SEEKING BEHAVIOR: Primary | ICD-10-CM

## 2020-01-07 PROCEDURE — 99215 OFFICE O/P EST HI 40 MIN: CPT | Performed by: PHYSICIAN ASSISTANT

## 2020-01-07 RX ORDER — ARIPIPRAZOLE 15 MG/1
15 TABLET ORAL AT BEDTIME
Qty: 90 TABLET | Refills: 3 | Status: CANCELLED | OUTPATIENT
Start: 2020-01-07

## 2020-01-07 ASSESSMENT — PAIN SCALES - GENERAL: PAINLEVEL: SEVERE PAIN (6)

## 2020-01-07 NOTE — TELEPHONE ENCOUNTER
RECORDS RECEIVED FROM: [facility]- Junaid Pritchett PA-C   DATE RECEIVED: 2/17/2020   NOTES STATUS DETAILS   OFFICE NOTE from referring provider  Internal 12/19/19 Office visit with Junaid Pritchett PA-C   OFFICE NOTE from other specialist   Internal 12/18/19 Office visit with Aura Rosario PA-C ([facility])   DISCHARGE SUMMARY from hospital  N/A    DISCHARGE REPORT from the ER [facility] 7/10/19 ([facility])   OPERATIVE REPORT  Internal    MEDICATION LIST Internal/ [facility]    LABS     PFC TESTING N/A    ANAL PAP N/A    BIOPSIES/PATHOLOGY RELATED TO DIAGNOSIS N/A    DIAGNOSTIC PROCEDURES     COLONOSCOPY Internal/ Received 7/17/19 ([facility])  8/14/18 ([facility])   UPPER ENDOSCOPY (EGD) Internal/ Received  EGD: 4/12/19 ([facility])  EGD: 8/14/18 ([facility])  EGD: 3/9/18 ([facility])   FLEX SIGMOIDOSCOPY  N/A    ERCP N/A    IMAGING (DISC & REPORT)      CT  [facility] Everywhere [facility]:  - CT Abdomen Pelvis: 1/6/2020    [facility] ([facility]):  - CT Abdomen Pelvis: 12/6/19    [facility]:  - CT Abdomen Pelvis: 12/6/19, 9/28/19, 8/31/19    [facility]:  - CT Abdomen Pelvis: 2/21/18, 4/16/16    [facility]):  - CT Abdomen Pelvis: 1/26/18 (archived into PACS on 1/8/2020 - Bhao)   MRI N/A    XRAY [facility] XR Abdomen: 9/29/19 ([facility])   ULTRASOUND (ENDOANAL/ENDORECTAL) N/A      Action 1/7/2020 -Bhao   Action Taken Fax request sent for images noted above:  - [facility] ([phone])  - [facility] ([phone])  - [facility] ([phone])  - [facility] ([phone])  - [facility] ([phone])     Action 1/8/2020 -Bhao   Action Taken All images requested has been pushed into PACS.

## 2020-01-07 NOTE — PATIENT INSTRUCTIONS
1. Colorectal surgery       [phone]     2. Schedule endometrial biopsy with Dr. Berumen     3. Re-schedule nutrition appointment

## 2020-01-07 NOTE — Clinical Note
Maggie, I did an extensive ROS and plan with this patient, was with her for over 1 hour, but she refused to let me examine her, so I am not sure if I can bill for a 5. Please reviewEMILY [facility]

## 2020-01-08 ENCOUNTER — TELEPHONE (OUTPATIENT)
Dept: FAMILY MEDICINE | Facility: OTHER | Age: 30
End: 2020-01-08

## 2020-01-08 ENCOUNTER — MYC MEDICAL ADVICE (OUTPATIENT)
Dept: FAMILY MEDICINE | Facility: OTHER | Age: 30
End: 2020-01-08

## 2020-01-08 ENCOUNTER — PRE VISIT (OUTPATIENT)
Dept: ONCOLOGY | Facility: CLINIC | Age: 30
End: 2020-01-08

## 2020-01-08 LAB — MIS SERPL-MCNC: NORMAL NG/ML

## 2020-01-08 NOTE — TELEPHONE ENCOUNTER
See Billie message. This has been discussed many times. She is going to the cancer clinic to see the Genetic Counselor so they can review her RISKS for bleeding/clotting disorders and ovarian cancer.     I sent her this in a message this AM.    Zach Rosario PA-C  [facility]

## 2020-01-08 NOTE — TELEPHONE ENCOUNTER
Update from 's office - I spoke with Jas, officer who is at her address.   No one has answered the door, but he can hear a dog inside the residence and her car is in the driveway.   Jas is going to try and ping her cell phone to see if it shows up at the home address.   Another [employer] message was sent to patient, per request of Jas, to see if she reads/responds.     Ines Roy, RN, BSN

## 2020-01-08 NOTE — TELEPHONE ENCOUNTER
Welfare check called for patient. Mom, Aysha, was also called (CTC on file). Mom reports receiving a message from patient approximately 15 minutes ago saying that patient doesn't want to live. Mom thanked me for the update.     Ines Roy RN, BSN

## 2020-01-08 NOTE — TELEPHONE ENCOUNTER
"Reason for Call:  Other     Detailed comments: this patient called with interpretor. She stated \"I missed my appt with the [facility] today. I will not be having the biopsy. Tell Zach I am done with her and that I am done with life!\" the patient then disconnected.    Phone Number Patient can be reached at: Home number on file [phone] (home)    Best Time: any    Can we leave a detailed message on this number? YES    Call taken on 1/8/2020 at 9:34 AM by Kenzie Maria      "

## 2020-01-08 NOTE — TELEPHONE ENCOUNTER
I spoke with Jas - advised that she has not read my message. Jas states that they can see her in the house and believes she is in the bathtub. Patient has been communicating with a neighbor via text message, saying she is depressed but will not be answering the door.   PCP updated as well.     Ines Roy, RN, BSN

## 2020-01-08 NOTE — TELEPHONE ENCOUNTER
"I received a call from Jas. The deputy was able to see in the window and could see that pt is in her bathtub. Pt's neighbor did speak with them and said she has been texting Katy. Katy said her stomach hurts and she just wants to be left alone. Katy did tell the neighbor that she does NOT have a plan to harm herself. Pts mom is also aware but lives out of state.    Jas did ask that someone from CDL's office MyChart patient to explain to her why she has been referred to \"a cancer\" doctor. Pt thinks she has cancer or has been diagnosed with cancer. If she has not been diagnosed with cancer then why has she been referred to a cancer doctor?    Will route to CDL     Claudine Jimenez RN, BSN    "

## 2020-01-09 ENCOUNTER — MYC MEDICAL ADVICE (OUTPATIENT)
Dept: FAMILY MEDICINE | Facility: OTHER | Age: 30
End: 2020-01-09

## 2020-01-10 ENCOUNTER — TELEPHONE (OUTPATIENT)
Dept: OBGYN | Facility: OTHER | Age: 30
End: 2020-01-10

## 2020-01-10 NOTE — TELEPHONE ENCOUNTER
Pt canceled her appt with Dr. Berumen yesterday, 1/9/2020, for an EMB.  She states she is really worried about it.  She does have another appt scheduled for Thursday, 1/16/2020, for an EMB.  Pt had questions regarding why she needs an EMB.  I explained to her it needs to be done before she gets a hysterectomy if that is what she is wanting.  They need to make sure she doesn't have any endometrial abnormalities.  Pt states she will do it.  Her mom is coming with her to give her support.  She had other questions too.  She wanted to know where the sample/biopsy was taken from.  I explained to her that a sample is taken from her uterus.  She asked if she would be sedated.  I told her no, this is a clinic procedure where she will not be sedated nor given any medications.  I did advise that she take 600 mg of IBU 60 minutes prior to her appt.  She asked if it hurts.  I explained to her that I have never had it done to me but that I have been in the room with the provider on several EMB's and pt's react differently.  Some can talk and smile through it and some appear to be in pain.  I explained that pt's have told me that it feels like really bad period cramps.    Pt also states she has vaginal itching.  I explained to her that vaginal itching can be caused by a yeast infection.  I advised her to try OTC Monistat.  Pt doesn't think it is a yeast infection as she has had those before and the discharge doesn't seem like a yeast infection.  Pt thinks she has bacterial vaginosis.  I explained to pt that she needs to be seen in clinic to be tested.  She states she has had BV several times.  I advised that she still needs to be seen in office for this.  She agreed and asked me to look for an appt today.  I was unable to find any appt in any of the [facility] providers.  Pt states she is in [place] today and needs something in that area.  I explained to pt that I would route to a  to help her with that.  She has decided  she will wait until her appt with Dr. Berumen to discuss.    Atiya Garcia RN

## 2020-01-12 ENCOUNTER — MYC REFILL (OUTPATIENT)
Dept: FAMILY MEDICINE | Facility: OTHER | Age: 30
End: 2020-01-12

## 2020-01-12 DIAGNOSIS — M54.42 CHRONIC BILATERAL LOW BACK PAIN WITH LEFT-SIDED SCIATICA: ICD-10-CM

## 2020-01-12 DIAGNOSIS — G89.29 CHRONIC BILATERAL LOW BACK PAIN WITH LEFT-SIDED SCIATICA: ICD-10-CM

## 2020-01-12 DIAGNOSIS — G89.4 CHRONIC PAIN SYNDROME: ICD-10-CM

## 2020-01-12 RX ORDER — OXYCODONE HCL 5 MG/5 ML
5-10 SOLUTION, ORAL ORAL EVERY 6 HOURS PRN
Qty: 420 ML | Refills: 0 | Status: CANCELLED | OUTPATIENT
Start: 2020-01-12

## 2020-01-13 ENCOUNTER — MYC MEDICAL ADVICE (OUTPATIENT)
Dept: FAMILY MEDICINE | Facility: OTHER | Age: 30
End: 2020-01-13

## 2020-01-13 ENCOUNTER — MYC REFILL (OUTPATIENT)
Dept: FAMILY MEDICINE | Facility: OTHER | Age: 30
End: 2020-01-13

## 2020-01-13 ENCOUNTER — TELEPHONE (OUTPATIENT)
Dept: FAMILY MEDICINE | Facility: OTHER | Age: 30
End: 2020-01-13

## 2020-01-13 DIAGNOSIS — G89.29 CHRONIC BILATERAL LOW BACK PAIN WITH LEFT-SIDED SCIATICA: ICD-10-CM

## 2020-01-13 DIAGNOSIS — F31.63 BIPOLAR DISORDER, CURRENT EPISODE MIXED, SEVERE, WITHOUT PSYCHOTIC FEATURES (H): ICD-10-CM

## 2020-01-13 DIAGNOSIS — F32.0 MILD MAJOR DEPRESSION (H): ICD-10-CM

## 2020-01-13 DIAGNOSIS — G89.4 CHRONIC PAIN SYNDROME: ICD-10-CM

## 2020-01-13 DIAGNOSIS — M54.42 CHRONIC BILATERAL LOW BACK PAIN WITH LEFT-SIDED SCIATICA: ICD-10-CM

## 2020-01-13 RX ORDER — BUSPIRONE HYDROCHLORIDE 15 MG/1
15 TABLET ORAL 2 TIMES DAILY
Qty: 60 TABLET | Refills: 5 | Status: CANCELLED | OUTPATIENT
Start: 2020-01-13

## 2020-01-13 RX ORDER — OXYCODONE HCL 5 MG/5 ML
5-10 SOLUTION, ORAL ORAL EVERY 6 HOURS PRN
Qty: 420 ML | Refills: 0 | OUTPATIENT
Start: 2020-01-13

## 2020-01-13 RX ORDER — BUSPIRONE HYDROCHLORIDE 15 MG/1
15 TABLET ORAL 2 TIMES DAILY
Qty: 60 TABLET | Refills: 5 | Status: SHIPPED | OUTPATIENT
Start: 2020-01-13 | End: 2021-07-26

## 2020-01-13 NOTE — TELEPHONE ENCOUNTER
I will no longer prescribe. I have sent her several message on this already.    Zach Rosario PA-C  [facility]

## 2020-01-13 NOTE — TELEPHONE ENCOUNTER
Patient calling. She is confused about the situation. She stated she was told she was suppose to request her refills 3 days in advance and she is not sure how she is in violation of her CSA.     She would like a call back to discuss this.     Explained the CDL is seeing patients at this time and will need time to address her messages.     Malik Messer,

## 2020-01-13 NOTE — TELEPHONE ENCOUNTER
OK for refill of Buspar.     Oxycodone is not due for refill until Thursday, 1/16/2020. Flushing her medication and requesting early is a violation of her CSA.     Rx for oxycodone denied.     Zach Rosario PA-C  [facility]

## 2020-01-13 NOTE — TELEPHONE ENCOUNTER
oxycodone   Last Written Prescription Date:  01/02/2020  Last Fill Quantity: 420ml,   # refills: 0  Last Office Visit: 01/07/2020  Future Office visit:    Next 5 appointments (look out 90 days)    Jan 16, 2020 10:00 AM CST  Office Visit with Monique Berumen DO, ASL IS  [facility] [address] [facility] [address]  [phone]           Routing refill request to provider for review/approval because:  Drug not on the [facility], [facility] or [facility] refill protocol or controlled substance    Patient was seen in the ER on 01/10/2020 at [facility].

## 2020-01-14 RX ORDER — OXYCODONE HCL 5 MG/5 ML
5-10 SOLUTION, ORAL ORAL EVERY 6 HOURS PRN
Qty: 420 ML | Refills: 0 | OUTPATIENT
Start: 2020-01-14

## 2020-01-14 NOTE — TELEPHONE ENCOUNTER
Pending Prescriptions:                       Disp   Refills    oxyCODONE (ROXICODONE) 5 MG/5ML solution  420 mL 0            Sig: Take 5-10 mLs (5-10 mg) by mouth every 6 hours as           needed for pain (max six 5 ML doses/day, total           daily dose max 30 mg) (two week supply)    Last Written Prescription Date:  1/2/2020  Last Fill Quantity: 420ml,  # refills: 0 (2 week supply)   Last office visit: 1/7/2020 with prescribing provider:     Future Office Visit:   Next 5 appointments (look out 90 days)    Jan 16, 2020 10:00 AM CST  Office Visit with Monique Berumen DO, ASL IS  [facility] [address] [facility] [id number] [phone]         Claudine Jimenez, RN, BSN

## 2020-01-15 NOTE — TELEPHONE ENCOUNTER
Pending Prescriptions:                       Disp   Refills    busPIRone (BUSPAR) 15 MG tablet           60 tab*5            Sig: Take 1 tablet (15 mg) by mouth 2 times daily    Sent 1/13/2020 with 6 month supply. Refill not appropriate at this time.     Claudine Jimenez, RN, BSN

## 2020-01-16 ENCOUNTER — OFFICE VISIT (OUTPATIENT)
Dept: OBGYN | Facility: OTHER | Age: 30
End: 2020-01-16
Payer: MEDICARE

## 2020-01-16 VITALS
SYSTOLIC BLOOD PRESSURE: 120 MMHG | WEIGHT: 182.5 LBS | BODY MASS INDEX: 33.38 KG/M2 | DIASTOLIC BLOOD PRESSURE: 80 MMHG | HEART RATE: 101 BPM

## 2020-01-16 DIAGNOSIS — Z12.4 SCREENING FOR MALIGNANT NEOPLASM OF CERVIX: ICD-10-CM

## 2020-01-16 DIAGNOSIS — N94.6 DYSMENORRHEA: ICD-10-CM

## 2020-01-16 DIAGNOSIS — N93.9 ABNORMAL UTERINE BLEEDING: Primary | ICD-10-CM

## 2020-01-16 DIAGNOSIS — F31.63 BIPOLAR DISORDER, CURRENT EPISODE MIXED, SEVERE, WITHOUT PSYCHOTIC FEATURES (H): ICD-10-CM

## 2020-01-16 DIAGNOSIS — R61 CHRONIC NIGHT SWEATS: ICD-10-CM

## 2020-01-16 DIAGNOSIS — K50.118 CROHN'S DISEASE OF COLON WITH OTHER COMPLICATION (H): ICD-10-CM

## 2020-01-16 PROCEDURE — G0476 HPV COMBO ASSAY CA SCREEN: HCPCS | Performed by: OBSTETRICS & GYNECOLOGY

## 2020-01-16 PROCEDURE — 88305 TISSUE EXAM BY PATHOLOGIST: CPT | Mod: 26 | Performed by: OBSTETRICS & GYNECOLOGY

## 2020-01-16 PROCEDURE — 58100 BIOPSY OF UTERUS LINING: CPT | Performed by: OBSTETRICS & GYNECOLOGY

## 2020-01-16 PROCEDURE — 88305 TISSUE EXAM BY PATHOLOGIST: CPT | Performed by: OBSTETRICS & GYNECOLOGY

## 2020-01-16 PROCEDURE — 99214 OFFICE O/P EST MOD 30 MIN: CPT | Mod: 25 | Performed by: OBSTETRICS & GYNECOLOGY

## 2020-01-16 PROCEDURE — 87624 HPV HI-RISK TYP POOLED RSLT: CPT | Performed by: OBSTETRICS & GYNECOLOGY

## 2020-01-16 PROCEDURE — G0145 SCR C/V CYTO,THINLAYER,RESCR: HCPCS | Performed by: OBSTETRICS & GYNECOLOGY

## 2020-01-16 NOTE — PROGRESS NOTES
"  SUBJECTIVE:       HPI: Katy Islas is a 29 year old  presents with  and her mother for follow-up for night sweats and AUB. Last seen on , with recommendation to go to scheduled genetic counseling appointment with oncology for family history of breast and ovarian cancer. She has been considering hysterectomy, but has previously wanted ovaries removed. I informed her that ovarian removal was not recommended unless genetic condition warrented removal. In addition, there was no guarentee that Depo Provera was the medication causing her night sweats, as there are other medications that she is on that also be the culprit. She does not want to continue with this medication and is not interested in any other form of hormonal medication to suppress menses. She just wants her uterus out.    Since that visit, she has been seen by her Primary care provider for known Crohn's disease and complaints of rectal bleeding, hematemesis and nausea. She was referred to colorectal specialist, who she is scheduled to see on . She missed her genetic counseling visit, but has rescheduled it for .     She has had mood changes as well, resulting in police being sent to her home for statements regarding \"being done with life.\" She also has a longstanding history of chronic pain, with known opiate dependence and drug-seeking behavior. Her pcp has been trying to work with Katy on tapering off of these medications and has referred her to a pain specialist. She has been seen in the ER x2 for continued chronic abdominal pain.    Today, she appears to be doing well. She states that she has not taken any opiate medication for over a week and is doing well. She has continued to have irregular bleeding associated with pelvic cramping. No new complaints today.    She denies vaginal discharge. She denies fevers/chills, bloating. Denies dysuria, hematuria, constipation or diarrhea.      Ob Hx:      Gyn Hx: " Patient's last menstrual period was 01/01/2020.    Patient is not sexually active.    Last pap was 4/2017 NIL, No history of abnormal paps.    STI history denies  Using depo or other injectable for contraception. Using Depo 7 years  STD testing offered?  Declined         reports that she has been smoking cigarettes. She has a 1.25 pack-year smoking history. She has never used smokeless tobacco.  Tobacco Cessation Action Plan: Information offered: Patient not interested at this time    Today's PHQ-2 Score:   PHQ-2 ( 1999 [employer]) 12/18/2019   Q1: Little interest or pleasure in doing things 1   Q2: Feeling down, depressed or hopeless 1   PHQ-2 Score 2   Q1: Little interest or pleasure in doing things Several days   Q2: Feeling down, depressed or hopeless Several days   PHQ-2 Score 2     Today's PHQ-9 Score:   PHQ-9 SCORE 12/3/2019   PHQ-9 Total Score -   PHQ-9 Total Score MyChart 7 (Mild depression)   PHQ-9 Total Score 7     Today's JULIETH-7 Score:   JULIETH-7 SCORE 12/3/2019   Total Score -   Total Score 4 (minimal anxiety)   Total Score 4       Problem list and histories reviewed & adjusted, as indicated.  Additional history: as documented.    Patient Active Problem List   Diagnosis     Hearing loss     Allergic rhinitis     Migraine     Benign neoplasm of skin of lower limb, including hip     Dysmenorrhea     Crohn's colitis (H)     Mild major depression (H)     Anxiety     Chronic pain     Bipolar disorder (H)     Marijuana abuse     Adjustment disorder with mixed anxiety and depressed mood     Herpes simplex virus infection     Gastroesophageal reflux disease without esophagitis     Bee sting allergy     Mild persistent asthma without complication     Chronic bilateral low back pain with left-sided sciatica     Vitamin D deficiency     Atypical mole     Lumbar disc disease with radiculopathy     S/P lumbar fusion     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)     Upper GI bleed - suspected     Tobacco use  disorder     History of pseudoseizure     Iliotibial band syndrome affecting lower leg, right     Chondromalacia of right patellofemoral joint     Melanocytic nevus, unspecified location     Dysplastic skin lesion     Patellar maltracking, right     Right anterior knee pain     Hypophosphatemia     Class 1 obesity due to excess calories without serious comorbidity with body mass index (BMI) of 32.0 to 32.9 in adult     Mild intermittent asthma     Cervicalgia     Bulge of cervical disc without myelopathy     Hypertension goal BP (blood pressure) < 130/80     Past Surgical History:   Procedure Laterality Date     AS REMOVAL OF COCCYX  10/18/2017     C FUSION OF SACROILIAC JOINT  10/26/2018     COLONOSCOPY  7/1/2009    [facility].     COLONOSCOPY N/A 7/17/2019    Procedure: Colonoscopy, With Polypectomy And Biopsy;  Surgeon: Edwin Conde MD;  Location: [facility] OR     COLONOSCOPY WITH CO2 INSUFFLATION N/A 7/17/2019    Procedure: COLONOSCOPY, WITH CO2 INSUFFLATION;  Surgeon: Edwin Conde MD;  Location: [facility] OR     COMBINED ESOPHAGOSCOPY, GASTROSCOPY, DUODENOSCOPY (EGD) WITH CO2 INSUFFLATION N/A 4/12/2019    Procedure: COMBINED ESOPHAGOSCOPY, GASTROSCOPY, DUODENOSCOPY (EGD) WITH CO2 INSUFFLATION;  Surgeon: Edwin Conde MD;  Location: [facility] OR     ESOPHAGOSCOPY, GASTROSCOPY, DUODENOSCOPY (EGD), COMBINED N/A 4/12/2019    Procedure: Combined Esophagoscopy, Gastroscopy, Duodenoscopy (Egd), Biopsy Single Or Multiple;  Surgeon: Edwin Conde MD;  Location: [facility] OR     FUSION SPINE POSTERIOR MINIMALLY INVASIVE ONE LEVEL N/A 2/23/2017    Procedure: FUSION SPINE POSTERIOR MINIMALLY INVASIVE ONE LEVEL;  L4-5 Oblique Lateral Lumbar Interbody Fusion   Epidural steroid injection.   Transpedicular Bone marrow aspiration;  Surgeon: Jeniffer Eugene MD;  Location:  OR      COLONOSCOPY THRU STOMA WITH BIOPSY  10/29/2003    Impression is that of normal appearing  colonoscopy, without evidence of rectal bleeding.     HC COLONOSCOPY THRU STOMA, DIAGNOSTIC  10/00    normal     HC COLONOSCOPY THRU STOMA, DIAGNOSTIC  Oct 2009    Dr López- normal     HC EEG AWAKE AND SLEEP      abnormal     HC MRI BRAIN W/O CONTRAST  12/00    normal     HC REMOVAL GALLBLADDER  8/5/2009    [email or web address]     HC UGI ENDOSCOPY DIAG W BIOPSY  11/11/09    Normal esophagus     HC UGI ENDOSCOPY, SIMPLE EXAM  7/00, 10/00    mild chronic esophagitis and duodenitis, neg H pylori     HC UGI ENDOSCOPY, SIMPLE EXAM  01/20/2005    Esophagogastroduodenoscopy, colonoscopy with biopsies.  [facility]     HC UGI ENDOSCOPY, SIMPLE EXAM  7/1/2009    [facility], [email or web address].     HC UGI ENDOSCOPY, SIMPLE EXAM  11/11/2009    attempted upper GI, pt. could not tolerate procedure:[facility]     ORTHOPEDIC SURGERY  October 19,2011    diskectomy L4-L5      Social History     Tobacco Use     Smoking status: Current Every Day Smoker     Packs/day: 0.25     Years: 5.00     Pack years: 1.25     Types: Cigarettes     Smokeless tobacco: Never Used   Substance Use Topics     Alcohol use: Not Currently     Comment: Not since last July 2018      Problem (# of Occurrences) Relation (Name,Age of Onset)    Breast Cancer (1) Maternal Grandmother    Cancer (2) Maternal Grandfather: Lung, Paternal Uncle    Cardiovascular (1) Paternal Grandfather: Heart Attack    Cerebrovascular Disease (1) Paternal Grandmother    Depression (1) Brother    Diabetes (1) Father    Gastrointestinal Disease (1) Brother: severe Crohn's    Genitourinary Problems (1) Father: kidney stones    Heart Disease (1) Father: Open heart surgery    Lung Cancer (2) Paternal Uncle, Maternal Uncle    Neurologic Disorder (1) Brother: Seizures post head injury    Parkinsonism (1) Maternal Grandmother    Substance Abuse (2) Brother, Mother: in recovery x1 year             acetaminophen (TYLENOL) 500 MG tablet, Take 2 tablets  (1,000 mg) by mouth 3 times daily as needed for mild pain  albuterol (PROAIR HFA/PROVENTIL HFA/VENTOLIN HFA) 108 (90 Base) MCG/ACT inhaler, Inhale 2 puffs into the lungs every 6 hours as needed for shortness of breath / dyspnea or wheezing  albuterol (PROVENTIL) (2.5 MG/3ML) 0.083% neb solution, Take 1 vial (2.5 mg) by nebulization every 6 hours as needed for shortness of breath / dyspnea or wheezing  alum & mag hydroxide-simethicone (MAALOX ADVANCED MAX ST) 400-400-40 MG/5ML SUSP suspension, Take 15 mLs by mouth every 4 hours as needed for indigestion (mix with lidocaine)  ARIPiprazole (ABILIFY) 15 MG tablet, Take 1 tablet (15 mg) by mouth At Bedtime  aspirin (ASA) 81 MG tablet, Take 1 tablet (81 mg) by mouth daily  azelastine (OPTIVAR) 0.05 % ophthalmic solution, Place 1 drop into both eyes 2 times daily  busPIRone (BUSPAR) 15 MG tablet, Take 1 tablet (15 mg) by mouth 2 times daily  cyclobenzaprine (FLEXERIL) 10 MG tablet, Take 1 tablet (10 mg) by mouth 3 times daily as needed for muscle spasms or other (back pain)  diphenhydrAMINE (BENADRYL) 50 MG capsule, Take 1-2 capsules ( mg) by mouth every 6 hours as needed for itching, allergies or sleep  DULoxetine (CYMBALTA) 60 MG EC capsule, Take 1 capsule (60 mg) by mouth daily  EPINEPHrine (EPIPEN/ADRENACLICK/OR ANY BX GENERIC EQUIV) 0.3 MG/0.3ML injection 2-pack, Inject 0.3 mLs (0.3 mg) into the muscle once as needed for anaphylaxis  fexofenadine (ALLEGRA) 180 MG tablet, Take 1 tablet (180 mg) by mouth daily  gabapentin (NEURONTIN) 300 MG capsule, TAKE 1 TO 3 CAPSULES(300  MG) BY MOUTH THREE TIMES DAILY  hydrocortisone (ANUSOL-HC) 2.5 % cream, Place rectally 2 times daily as needed for hemorrhoids  hyoscyamine (ANASPAZ/LEVSIN) 0.125 MG tablet, Take 1 tablet (125 mcg) by mouth every 4 hours as needed for cramping  lidocaine (XYLOCAINE) 2 % solution, Swish and swallow 15 mLs in mouth every 4 hours as needed for other (GERD) ; Max 8 doses/24 hour period. Mix  with 15 mLs Maalox or Mylanta.  losartan (COZAAR) 100 MG tablet, Take 1 tablet (100 mg) by mouth daily  medical cannabis (Patient's own supply), (The purpose of this order is to document that the patient reports taking medical cannabis.  This is not a prescription, and is not used to certify that the patient has a qualifying medical condition.)  methocarbamol (ROBAXIN) 750 MG tablet, Take 1 tablet (750 mg) by mouth 3 times daily as needed for muscle spasms  metoprolol succinate ER (TOPROL-XL) 200 MG 24 hr tablet, Take 1 tablet (200 mg) by mouth daily  NONFORMULARY, Apply 30 mLs topically 4 times daily  pantoprazole (PROTONIX) 40 MG EC tablet, Take 1 tablet (40 mg) by mouth 2 times daily Take 30-60 minutes before a meal.  prazosin (MINIPRESS) 1 MG capsule, Take 1 mg by mouth At Bedtime  rizatriptan (MAXALT-MLT) 5 MG ODT, Take 1-2 tablets (5-10 mg) by mouth at onset of headache for migraine May repeat in 2 hours. Max 6 tablets/24 hours.  sucralfate (CARAFATE) 1 GM tablet, Take 1 tablet (1 g) by mouth 4 times daily as needed (heartburn)  medroxyPROGESTERone (DEPO-PROVERA) 150 MG/ML IM injection, Inject 1 mL (150 mg) into the muscle every 3 months  ondansetron (ZOFRAN-ODT) 8 MG ODT tab, Take 1 tablet (8 mg) by mouth every 8 hours as needed for nausea  oxyCODONE (ROXICODONE) 5 MG/5ML solution, Take 5-10 mLs (5-10 mg) by mouth every 6 hours as needed for pain (max six 5 ML doses/day, total daily dose max 30 mg) (two week supply) (Patient not taking: Reported on 1/16/2020)  valACYclovir (VALTREX) 1000 mg tablet, 2 tabs at onset of mouth sores.  Repeat dose in 12 hours.    medroxyPROGESTERone (DEPO-PROVERA) injection 150 mg  medroxyPROGESTERone (DEPO-PROVERA) injection 150 mg      Allergies   Allergen Reactions     Iohexol Hives     Other reaction(s): Hives  IV contrast; patient states she tolerates contrast if she gets benadryl before  IV contrast; patient states she tolerates contrast if she gets benadryl before      Ativan [Lorazepam] Hives     At the IV site     Baclofen      hives     Bees Hives, Swelling and Difficulty breathing     Caffeine      Contrast Dye      Hives,   Updated 5/10/2016 CT Contrast.     Iodine Hives     Methocarbamol Swelling     Metoclopramide      Other reaction(s): Tremors  LW Reaction: shaking/sweating     Midazolam      Other reaction(s): Agitation     Monosodium Glutamate      Morphine Other (See Comments)     Difficulty with urination     Nsaids Other (See Comments)     GI BLEED x2     Other (Do Not Use) Other (See Comments)     Xanaflex- pt becomes disoriented and loses bladder control     Reglan [Metoclopramide Hcl] Other (See Comments)     shaking     Soma [Carisoprodol] Visual Disturbance     Sleep walking     Tizanidine      Topamax Other (See Comments)     Topamax [Topiramate] Nausea     Tingling  GI/Vomit     Tramadol      Severe Headache, Seizure Risk     Tylenol W/Codeine [Acetaminophen-Codeine] Nausea and Itching     Tylenol 3     Valium Other (See Comments)     Becomes aggressive and angry     Versed Other (See Comments)     Zolmitriptan      Makes face feel like its twitching     Droperidol Anxiety     Flu Virus Vaccine Rash      Arm swelling       ROS:  10 Point review of systems negative other noted above in HPI    OBJECTIVE:     /80   Pulse 101   Wt 82.8 kg (182 lb 8 oz)   LMP 01/01/2020   BMI 33.38 kg/m    Body mass index is 33.38 kg/m .      Gen: Alert, oriented, appropriately interactive, NAD  Chest: Symmetrical, unlabored breathing  Abdomen: soft, obese, non tender, non distended, no masses, no hernias. No inguinal lymphadenopathy.   External genitalia: no lesions; normal appearing external genitalia, bartholins glands, urethra, skenes glands  Vagina: no masses or lesions or discharge, normally rugated.  Cervix: no masses or lesions or discharge   Bimanual exam:   Nontender pelvic floor muscles  Urethra: nontender   Bladder: nontender and without massess, well  supported   Uterus: midline, anteverted, small, mobile  no masses, mild tenderness with exam. EMB performed, please see procedure note below.  Adnexa: no masses or tenderness appreciated   No cervical motion tenderness  MSK: normal gait, symmetric movements UE & LE  Lower extremities: non-tender, no edema      In-Clinic Test Results:  No results found. However, due to the size of the patient record, not all encounters were searched. Please check Results Review for a complete set of results.     DATE:  2019 11:30 AM    CLINICAL DATA:  Pelvic pain.    ADDITIONAL CLINICAL DATA: Negative hCG.    COMPARISON:  None    TECHNIQUE:  Real-time transabdominal and transvaginal scanning was performed.  Transvaginal   imaging was necessary to better visualize the endometrium and ovaries.     FINDINGS:      UTERUS:  Normal.    UTERUS SIZE: 5 X 3.5 X 2.2 cm.    ENDOMETRIUM:  Normal in echotexture.  Endometrium measures 3 mm in thickness.    RIGHT OVARY:  Normal in size and echotexture.  The right ovary measures 3.1 X 2.3 X 1.5 cm.     Right ovary demonstrates normal blood flow. 1.4 cm dominant follicle noted in the right ovary.    LEFT OVARY:  Normal in size and echotexture.  The left ovary measures 2.4 X 1.8 X 0.9 cm.     Left ovary demonstrates normal blood flow.      FLUID:   No free fluid.    IMPRESSION:    1.  Normal pelvic ultrasound. No significant adnexal mass. No free fluid.    Electronically Signed By: Molly Benitez on 2019 12:22 PM    ASSESSMENT/PLAN:                                                      Katy Islas is a 29 year old  who presents today for night sweats on Depo Provera, AUB and dysmenorrhea      ICD-10-CM    1. Abnormal uterine bleeding N93.9 ENDOMETRIAL BIOPSY W/O CERVICAL DILATION     Surgical pathology exam   2. Screening for malignant neoplasm of cervix Z12.4 Pap imaged thin layer screen with HPV - recommended age 30 - 65 years (select HPV order below)     HPV High Risk Types DNA  Cervical   3. Dysmenorrhea N94.6    4. Crohn's disease of colon with other complication (H) K50.118    5. Bipolar disorder, current episode mixed, severe, without psychotic features (H) F31.63    6. Chronic night sweats R61      Reviewed history of night sweats while on Depo Provera, reiterating that this is not a common side effect associated with the medication. Baseline labs wnl, and reviewed considering evaluating other medications as potential etiologies.     History AUB, not adequately controlled with Depo Provera. Katy has previously tried IUD with negative side effects. She is not interested in any other form of hormonal suppression and is not a candidate for COCs with hx hypertension. Pelvic ultrasound completed and wnl.     We also reviewed surgical options today, including endometrial ablation and hysterectomy. She has no desire to have children and continue to desire hysterectomy as definitive surgery. We discussed the risks and benefits of the surgery, including required restrictions post-operatively. She previously stated that she could not make these restriction work with her current job, but now agrees to the restrictions.  Details of the procedure were discussed with the patient.  Risks include, but are not limited to, bleeding, infection, and injury to surrounding organs such as the bowel, urinary system, nerves, and blood vessels.  Injury may result in repair at the time of the surgery or in a separate procedure.  All questions answered, and accepting these risks, the patient elects to proceed with the procedure.    Prior to scheduling Katy for a hysterectomy, I have asked several things of her to ensure that she is adequately prepared for the surgery and her other medical conditions have been accounted for. She agreed to pre-operative EMB today in the office, which was completed without complication. She was asked to see genetic counseling, as she missed the last appointment and has now  rescheduled. She agrees to this and waiting for any testing results before having the surgery. I also asked that she see colorectal surgery given history of chronic abdominal pain, with Crohn's diagnosis and new rectal bleeding. While hysterectomy will improve her AUB and dysmenorrhea, I cannot guarantee that it will improve her chronic pain. She acknowledged this and agrees. Furthermore, given her history of opiate dependence, now off opiate medications, we had a long discussion regarding pain and post-operative pain requiring medications. I would like her to see her Primary care provider or a pain specialist to discuss medication management and plan for handling post-operative pain. She also agreed to this. She will also need pre-operative clearance closer to surgery date.    Once she has completed her genetic counseling referral/work-up as indicated and colorectal consult, she will let the office know and surgery will be requested. Will plan for total laparoscopic hysterectomy and bilateral salpingectomy, with ovarian preservation unless indicated for removal by genetic testing or gross abnormalities. This was discussed with Katy and her mother in detail, who are in agreement.    All questions were answered.      30 minutes was spent face to face with the patient today discussing her history, diagnosis, and follow-up plan as noted above.  Over 50% of the visit was spent in counseling and coordination of care, not including time spent for procedure or exam.    Monique Berumen,   [facility]      EMB Procedure    Risks benefits and alternatives of the procedure were discussed with the patient.  All questions were answered and consent was signed.  A speculum was inserted with visualization of the cervix.  Cervix was cleaned with Betadine solution.  The anterior lip of the cervix was grasped with a single-toothed tenaculum.  The EMB Pipelle was inserted to the uterine fundus without difficulty and  the uterus was sounded to 6cm.  The Pipelle was withdrawn as it was rotated 3-4 times to obtain the sample.  2 passes were made with appropriate sampling.  All instruments were removed with adequate hemostasis. Endometrial specimen was sent to pathology in formalin.

## 2020-01-19 ENCOUNTER — MYC MEDICAL ADVICE (OUTPATIENT)
Dept: OBGYN | Facility: OTHER | Age: 30
End: 2020-01-19

## 2020-01-19 DIAGNOSIS — N93.9 ABNORMAL UTERINE BLEEDING: Primary | ICD-10-CM

## 2020-01-20 DIAGNOSIS — N93.9 ABNORMAL UTERINE BLEEDING: Primary | ICD-10-CM

## 2020-01-20 LAB — COPATH REPORT: NORMAL

## 2020-01-20 RX ORDER — MEDROXYPROGESTERONE ACETATE 150 MG/ML
150 INJECTION, SUSPENSION INTRAMUSCULAR
Status: ACTIVE | OUTPATIENT
Start: 2020-01-20 | End: 2021-01-14

## 2020-01-20 RX ORDER — MEDROXYPROGESTERONE ACETATE 150 MG/ML
150 INJECTION, SUSPENSION INTRAMUSCULAR
Qty: 1 ML | Refills: 3
Start: 2020-01-20 | End: 2020-02-04

## 2020-01-20 NOTE — TELEPHONE ENCOUNTER
Monique Berumen, DO  You 6 minutes ago (1:25 PM)      Katy should see her GI physician or colorectal as scheduled to discuss abdominal pain prior to scheduling the hysterectomy.  She can come in any time this week to receive the Depo injection, as it looks like she is due by the 29th.     Monique Berumen, DO

## 2020-01-20 NOTE — TELEPHONE ENCOUNTER
Rx for Depo Provera was last prescribed on 12/13/2018 with 3 refills.    Per 1/16/2020 OV plan:  Prior to scheduling Katy for a hysterectomy, I have asked several things of her to ensure that she is adequately prepared for the surgery and her other medical conditions have been accounted for. She agreed to pre-operative EMB today in the office, which was completed without complication. She was asked to see genetic counseling, as she missed the last appointment and has now rescheduled. She agrees to this and waiting for any testing results before having the surgery. I also asked that she see colorectal surgery given history of chronic abdominal pain, with Crohn's diagnosis and new rectal bleeding. While hysterectomy will improve her AUB and dysmenorrhea, I cannot guarantee that it will improve her chronic pain. She acknowledged this and agrees. Furthermore, given her history of opiate dependence, now off opiate medications, we had a long discussion regarding pain and post-operative pain requiring medications. I would like her to see her Primary care provider or a pain specialist to discuss medication management and plan for handling post-operative pain. She also agreed to this. She will also need pre-operative clearance closer to surgery date.  Once she has completed her genetic counseling referral/work-up as indicated and colorectal consult, she will let the office know and surgery will be requested. Will plan for total laparoscopic hysterectomy and bilateral salpingectomy, with ovarian preservation unless indicated for removal by genetic testing or gross abnormalities. This was discussed with Katy and her mother in detail, who are in agreement.

## 2020-01-21 ENCOUNTER — TELEPHONE (OUTPATIENT)
Dept: FAMILY MEDICINE | Facility: OTHER | Age: 30
End: 2020-01-21

## 2020-01-21 ENCOUNTER — MYC MEDICAL ADVICE (OUTPATIENT)
Dept: FAMILY MEDICINE | Facility: OTHER | Age: 30
End: 2020-01-21

## 2020-01-21 DIAGNOSIS — Z83.2 FAMILY HISTORY OF BLEEDING DISORDER: Primary | ICD-10-CM

## 2020-01-21 LAB
COPATH REPORT: NORMAL
PAP: NORMAL

## 2020-01-21 NOTE — TELEPHONE ENCOUNTER
[employer] message sent. Last depo was 10/29/19, patient can have [facility] contact [facility] and have them transfer prescriptions.   Katy Sheikh, CMA

## 2020-01-21 NOTE — TELEPHONE ENCOUNTER
Referral to bleeding and clotting clinic placed     Zach Rosario PA-C  [facility]     
none

## 2020-01-22 ENCOUNTER — ALLIED HEALTH/NURSE VISIT (OUTPATIENT)
Dept: FAMILY MEDICINE | Facility: OTHER | Age: 30
End: 2020-01-22
Payer: MEDICARE

## 2020-01-22 ENCOUNTER — OFFICE VISIT (OUTPATIENT)
Dept: GASTROENTEROLOGY | Facility: CLINIC | Age: 30
End: 2020-01-22
Payer: MEDICARE

## 2020-01-22 ENCOUNTER — OFFICE VISIT (OUTPATIENT)
Dept: ONCOLOGY | Facility: CLINIC | Age: 30
End: 2020-01-22
Attending: PHYSICIAN ASSISTANT
Payer: MEDICARE

## 2020-01-22 VITALS — DIASTOLIC BLOOD PRESSURE: 88 MMHG | SYSTOLIC BLOOD PRESSURE: 136 MMHG

## 2020-01-22 VITALS
BODY MASS INDEX: 33.42 KG/M2 | DIASTOLIC BLOOD PRESSURE: 74 MMHG | HEART RATE: 111 BPM | OXYGEN SATURATION: 100 % | SYSTOLIC BLOOD PRESSURE: 115 MMHG | WEIGHT: 182.7 LBS

## 2020-01-22 DIAGNOSIS — Z80.41 FAMILY HISTORY OF MALIGNANT NEOPLASM OF OVARY: Primary | ICD-10-CM

## 2020-01-22 DIAGNOSIS — Z80.42 FAMILY HISTORY OF PROSTATE CANCER: ICD-10-CM

## 2020-01-22 DIAGNOSIS — Z30.9 CONTRACEPTIVE MANAGEMENT: Primary | ICD-10-CM

## 2020-01-22 DIAGNOSIS — Z80.3 FAMILY HISTORY OF MALIGNANT NEOPLASM OF BREAST: ICD-10-CM

## 2020-01-22 DIAGNOSIS — Z80.8 FAMILY HISTORY OF SKIN CANCER: ICD-10-CM

## 2020-01-22 DIAGNOSIS — R10.9 ABDOMINAL CRAMPING: ICD-10-CM

## 2020-01-22 DIAGNOSIS — K58.0 IRRITABLE BOWEL SYNDROME WITH DIARRHEA: Primary | ICD-10-CM

## 2020-01-22 DIAGNOSIS — Z80.41 FAMILY HISTORY OF MALIGNANT NEOPLASM OF OVARY: ICD-10-CM

## 2020-01-22 LAB
FINAL DIAGNOSIS: NORMAL
HPV HR 12 DNA CVX QL NAA+PROBE: NEGATIVE
HPV16 DNA SPEC QL NAA+PROBE: NEGATIVE
HPV18 DNA SPEC QL NAA+PROBE: NEGATIVE
LOCATION PERFORMED: NORMAL
MISCELLANEOUS TEST: NORMAL
MISCELLANEOUS TEST: NORMAL
RESULT: NORMAL
SEND OUTS MISC TEST CODE: NORMAL
SEND OUTS MISC TEST SPECIMEN: NORMAL
SPECIMEN DESCRIPTION: NORMAL
SPECIMEN SOURCE CVX/VAG CYTO: NORMAL
TEST NAME: NORMAL

## 2020-01-22 PROCEDURE — 99214 OFFICE O/P EST MOD 30 MIN: CPT | Performed by: PHYSICIAN ASSISTANT

## 2020-01-22 PROCEDURE — 96040 ZZH GENETIC COUNSELING, EACH 30 MINUTES: CPT | Mod: ZF | Performed by: GENETIC COUNSELOR, MS

## 2020-01-22 PROCEDURE — 36415 COLL VENOUS BLD VENIPUNCTURE: CPT | Performed by: COLON & RECTAL SURGERY

## 2020-01-22 PROCEDURE — 96372 THER/PROPH/DIAG INJ SC/IM: CPT

## 2020-01-22 PROCEDURE — 99207 ZZC NO CHARGE NURSE ONLY: CPT

## 2020-01-22 RX ADMIN — MEDROXYPROGESTERONE ACETATE 150 MG: 150 INJECTION, SUSPENSION INTRAMUSCULAR at 09:09

## 2020-01-22 ASSESSMENT — PAIN SCALES - GENERAL: PAINLEVEL: SEVERE PAIN (7)

## 2020-01-22 NOTE — PROGRESS NOTES
Clinic Administered Medication Documentation    MEDICATION LIST:   Depo Provera Documentation    Prior to injection, verified patient identity using patient's name and date of birth. Medication was administered. Please see MAR and medication order for additional information. Patient instructed to remain in clinic for 15 minutes and report any adverse reaction to staff immediately .    BP: 136/88    LAST PAP/EXAM:   Lab Results   Component Value Date    PAP NIL 01/16/2020     URINE HCG:not indicated    NEXT INJECTION DUE: 4/8/20 - 4/22/20    Was entire vial of medication used? Yes  Vial/Syringe: Single dose vial  Expiration Date:  7/2020  Right glute - patient did not want to change sides   Katy Sheikh, CMA

## 2020-01-22 NOTE — PROGRESS NOTES
1/22/2020    Referring Provider: Aura Rosario PA-C    Presenting Information:   I met with Katy Islas today for genetic counseling at the Cancer Risk Management Program at the [facility] to discuss her family history of cancer.  She is here today to review this history, cancer screening recommendations, and available genetic testing options.    Personal History:  Katy is a 29 year old female. She does not have any personal history of cancer.      She had her first menstrual period at age 11 and is premenopausal. She does not have any children. Katy has her ovaries, fallopian tubes and uterus in place. She is considering hysterectomy and would like to use her genetic testing results for final surgical planning, specifically whether or not she should have her ovaries removed. She had a pelvic ultrasound on 12/29/19 which was normal. She uses the Depo Provera shot. She has not yet had any breast imaging due to her age. Katy has Crohn's disease and has had a number of colonoscopies and upper endoscopies. Her most recent colonoscopy on 7/17/19 detected one hyperplastic polyp. Prior colonoscopies/sigmoidoscopies in 2018, 2012, 2011, 2009, and 2005 detected no polyps. Upper endoscopies in 2019, 2018, 2012, 2009, and 2005 also detected no polyps. She has had multiple skin biopsies. On 7/17/17 skin biopsies on her mid back, right lower back, and left upper back all showed dysplastic compound nevus with mild atypia. On 8/28/17 a skin biopsy on her right lower leg revealed an intradermal nevus without atypia and a biopsy on her center low back revealed a compound nevus without atypia. Skin biopsies on 7/6/18 showed an intradermal melanocytic nevus (left forearm), a compound dysplastic nevus with mild atypia (left shoulder), an intradermal melanocytic nevus (right elbow), and a compound dysplastic nevus with mild atypia (right flank). She had a second excision of the left forearm biopsy and  left shoulder biopsy on 7/23/18. She reports that she did have a lot of sun exposure in the past. Katy reported current tobacco use and no alcohol use.    Family History: (Please see scanned pedigree for detailed family history information)  Siblings:  She has one brother who is 32 years old with no known history of cancer. He also has Crohn's disease.  Maternal:  Her mother is 69 years old. She had an ovarian tumor removed in 2008, and Katy is unsure if this was a cancer. She reports that her mother was very private about this.   Her maternal aunt is in her 60s and was diagnosed with breast cancer at age 50. She had a bilateral mastectomy. She reportedly had negative genetic testing approximately 10 years ago (unknown which genes were tested).   Her maternal grandmother was diagnosed with breast cancer at an unknown age. She passed away at age 91.    Her great-uncle (grandmother's brother) was diagnosed with prostate cancer and passed away in his 70s.   Her maternal grandfather was diagnosed with lung cancer. He had a history of smoking.  Paternal:  Her father had a history of multiple skin cancers (unknown types). He passed away at age 58. He had a lot of sun exposure as well as agent orange exposure.   Her paternal aunt has no known history of cancer, although Katy reports that she has limited contact with her paternal relatives.  This aunt's daughter (Katy's cousin) had a daughter (Katy's first cousin once removed) who passed away at age 25 due to ovarian cancer.    Her maternal ethnicity is Mongolian. Her paternal ethnicity is unknown. There is no known Ashkenazi Buddhism ancestry on either side of her family.     Discussion:    Katy's family history of cancer is suggestive of a hereditary cancer syndrome.    We reviewed the features of sporadic, familial, and hereditary cancers. In looking at Katy's family history, it is possible that a cancer susceptibility gene is present due to her maternal family history  of breast cancer and possibly ovarian cancer, as well as her paternal family history of ovarian cancer.    We discussed the BRCA1 and BRCA2 genes. Mutations in these genes cause a condition known as Hereditary Breast and Ovarian Cancer syndrome (HBOC). Women with a mutation in either of these genes are at increased risk for breast and ovarian cancer. There is also an increased risk for a second primary breast cancer. Men with a mutation in either of these genes are at increased risk for breast and prostate cancer. Both women and men may also be at increased risk for pancreatic cancer and melanoma. A detailed handout regarding these genes and other genes in which mutations are associated with an increased risk for breast cancer was provided to Katy at the end of our appointment today and can be found in the after visit summary. Topics included: inheritance pattern, cancer risks, cancer screening recommendations, and also risks, benefits and limitations of testing.    Katy also asked about Nguyen syndrome. We discussed that Nguyen syndrome can be caused by a mutation in one of five genes:  MLH1, MSH2, MSH6, PMS2, and EPCAM.  The highest cancer risks associated with Nguyen syndrome include colon cancer, endometrial/uterine cancer, gastric cancer, and ovarian cancer.  Other cancers have also been reported with Nguyen syndrome, such as urinary tract, pancreas, bile duct, and other cancers.     Based on her family history, Katy meets current National Comprehensive Cancer Network (NCCN) criteria for genetic testing of high-penetrance breast and/or ovarian cancer susceptibility genes, which often includes BRCA1, BRCA2, CDH1, PALB2, PTEN, and TP53 among others.     We discussed that there are additional genes that could cause increased risk for breast and ovarian cancer. As many of these genes present with overlapping features in a family and accurate cancer risk cannot always be established based upon the pedigree analysis  alone, it would be reasonable for Katy to consider panel genetic testing to analyze multiple genes at once.  We reviewed genetic testing options for the cancers seen in Katy s family history that suggest a hereditary cause: a more targeted panel of genes associated with an increased risk for breast and gynecologic cancers or an expanded panel which would include additional genes associated with an increased risk for other types of cancer. Katy expressed an interest in more broad testing. She opted for an expanded custom panel (a combination of the [facility] Common Hereditary Cancers Panel + [facility] Melanoma Panel).  Genetic testing is available for 47 genes associated with cancers of the breast, ovary, uterus, prostate and gastrointestinal system: [facility] Common Hereditary Cancers panel (APC, GARTH, AXIN2, BARD1, BMPR1A, BRCA1, BRCA2, BRIP1, CDH1, CDK4, CDKN2A, CHEK2, CTNNA1, DICER1, EPCAM, GREM1, HOXB13, KIT, MEN1, MLH1, MSH2, MSH3, MSH6, MUTYH, NBN, NF1, NTHL1, PALB2, PDGFRA, PMS2, POLD1, POLE, PTEN,RAD50, RAD51C, RAD51D, SDHA, SDHB, SDHC, SDHD, SMAD4, SMARCA4, STK11, TP53,TSC1, TSC2, VHL).    We discussed that many of these genes are associated with specific hereditary cancer syndromes and published management guidelines: Hereditary Breast and Ovarian Cancer syndrome (BRCA1, BRCA2), Nguyen syndrome (MLH1, MSH2, MSH6, PMS2, EPCAM), Familial Adenomatous Polyposis (APC), Hereditary Diffuse Gastric Cancer (CDH1), Familial Atypical Multiple Mole Melanoma syndrome (CDK4, CDKN2A), Multiple Endocrine Neoplasia type 1 (MEN1), Juvenile Polyposis syndrome (BMPR1A, SMAD4), Cowden syndrome (PTEN), Li Fraumeni syndrome (TP53), Hereditary Paraganglioma and Pheochromocytoma syndrome (SDHA, SDHB, SDHC, SDHD), Peutz-Jeghers syndrome (STK11), MUTYH Associated Polyposis (MUTYH), Tuberous sclerosis complex (TSC1, TSC2), Von Hippel-Lindau disease (VHL), and Neurofibromatosis type 1 (NF1).   The GARTH, AXIN2, BRIP1, CHEK2, GREM1, MSH3,  NBN, NTHL1, PALB2, POLD1, POLE, RAD51C, and RAD51D genes are associated with increased cancer risk and have published management guidelines for certain cancers.   The remaining genes (BARD1, CTNNA1, DICER1, HOXB13, KIT, PDGFRA, RAD50, and SMARCA4) are associated with increased cancer risk and may allow us to make medical recommendations when mutations are identified.   As described above, Katy was also interested in adding additional genes associated with an increased risk for melanoma.  Consent was obtained and genetic testing for a custom panel (a combination of the [employer] Common Hereditary Cancers Panel + [employer] [facility] Melanoma Panel) was sent to [facility]. Turn around time: approximately 3-4 weeks.    Medical Management: For Katy, we reviewed that the information from genetic testing may determine:    additional cancer screening for which Katy may qualify (i.e. mammogram and breast MRI, more frequent colonoscopies, more frequent dermatologic exams, etc.),    options for risk reducing surgeries Katy could consider (i.e. bilateral mastectomy, surgery to remove her ovaries and/or uterus, etc.),      and targeted chemotherapies if she were to develop certain cancers in the future (i.e. immunotherapy for individuals with Nguyen syndrome, PARP inhibitors, etc.).     These recommendations will be discussed in detail once genetic testing is completed.     Plan:  1) Today Katy elected to proceed with genetic testing via a custom panel (a combination of the [employer] Common Hereditary Cancers Panel + [employer] Melanoma Panel) offered by [employer] [facility].  2) This information should be available in 3-4 weeks.  3) Katy will receive a phone call to discuss the results.    Face to face time: 60 minutes    Yessica Vernon MS, [facility]  Licensed Genetic Counselor  Office: [phone]

## 2020-01-22 NOTE — LETTER
Cancer Risk Management  Program Locations    [facility]  [facility]  Mailing Address  Cancer Risk Management Program  [facility]  [address] [place] [address]    New patient appointments  [phone]  January 26, 2020    Katy Islas  [address]  [place] [address]      Dear Katy,    It was a pleasure meeting with you at the Cancer Risk Management Program at the [facility] on 1/22/2020.  Here is a copy of the progress note from your recent genetic counseling visit to the Cancer Risk Management Program.  If you have any additional questions, please feel free to call.    Referring Provider: Aura Rosario PA-C    Presenting Information:   I met with Katy Janel today for genetic counseling at the Cancer Risk Management Program at the [facility] to discuss her family history of cancer.  She is here today to review this history, cancer screening recommendations, and available genetic testing options.    Personal History:  Katy is a 29 year old female. She does not have any personal history of cancer.      She had her first menstrual period at age 11 and is premenopausal. She does not have any children. Katy has her ovaries, fallopian tubes and uterus in place. She is considering hysterectomy and would like to use her genetic testing results for final surgical planning, specifically whether or not she should have her ovaries removed. She had a pelvic ultrasound on 12/29/19 which was normal. She uses the Depo Provera shot. She has not yet had any breast imaging due to her age. Katy has Crohn's disease and has had a number of colonoscopies and upper endoscopies. Her most recent colonoscopy on 7/17/19 detected one hyperplastic polyp. Prior  colonoscopies/sigmoidoscopies in 2018, 2012, 2011, 2009, and 2005 detected no polyps. Upper endoscopies in 2019, 2018, 2012, 2009, and 2005 also detected no polyps. She has had multiple skin biopsies. On 7/17/17 skin biopsies on her mid back, right lower back, and left upper back all showed dysplastic compound nevus with mild atypia. On 8/28/17 a skin biopsy on her right lower leg revealed an intradermal nevus without atypia and a biopsy on her center low back revealed a compound nevus without atypia. Skin biopsies on 7/6/18 showed an intradermal melanocytic nevus (left forearm), a compound dysplastic nevus with mild atypia (left shoulder), an intradermal melanocytic nevus (right elbow), and a compound dysplastic nevus with mild atypia (right flank). She had a second excision of the left forearm biopsy and left shoulder biopsy on 7/23/18. She reports that she did have a lot of sun exposure in the past. Katy reported current tobacco use and no alcohol use.    Family History: (Please see scanned pedigree for detailed family history information)  Siblings:  She has one brother who is 32 years old with no known history of cancer. He also has Crohn's disease.  Maternal:  Her mother is 69 years old. She had an ovarian tumor removed in 2008, and Katy is unsure if this was a cancer. She reports that her mother was very private about this.   Her maternal aunt is in her 60s and was diagnosed with breast cancer at age 50. She had a bilateral mastectomy. She reportedly had negative genetic testing approximately 10 years ago (unknown which genes were tested).   Her maternal grandmother was diagnosed with breast cancer at an unknown age. She passed away at age 91.    Her great-uncle (grandmother's brother) was diagnosed with prostate cancer and passed away in his 70s.   Her maternal grandfather was diagnosed with lung cancer. He had a history of smoking.  Paternal:  Her father had a history of multiple skin cancers (unknown  types). He passed away at age 58. He had a lot of sun exposure as well as agent orange exposure.   Her paternal aunt has no known history of cancer, although Katy reports that she has limited contact with her paternal relatives.  This aunt's daughter (Katy's cousin) had a daughter (Katy's first cousin once removed) who passed away at age 25 due to ovarian cancer.    Her maternal ethnicity is Turkmen. Her paternal ethnicity is unknown. There is no known Ashkenazi Hoahaoism ancestry on either side of her family.     Discussion:    Katy's family history of cancer is suggestive of a hereditary cancer syndrome.    We reviewed the features of sporadic, familial, and hereditary cancers. In looking at Katy's family history, it is possible that a cancer susceptibility gene is present due to her maternal family history of breast cancer and possibly ovarian cancer, as well as her paternal family history of ovarian cancer.    We discussed the BRCA1 and BRCA2 genes. Mutations in these genes cause a condition known as Hereditary Breast and Ovarian Cancer syndrome (HBOC). Women with a mutation in either of these genes are at increased risk for breast and ovarian cancer. There is also an increased risk for a second primary breast cancer. Men with a mutation in either of these genes are at increased risk for breast and prostate cancer. Both women and men may also be at increased risk for pancreatic cancer and melanoma. A detailed handout regarding these genes and other genes in which mutations are associated with an increased risk for breast cancer was provided to Katy at the end of our appointment today and can be found in the after visit summary. Topics included: inheritance pattern, cancer risks, cancer screening recommendations, and also risks, benefits and limitations of testing.    Katy also asked about Nguyen syndrome. We discussed that Nguyen syndrome can be caused by a mutation in one of five genes:  MLH1, MSH2, MSH6,  PMS2, and EPCAM.  The highest cancer risks associated with Nguyen syndrome include colon cancer, endometrial/uterine cancer, gastric cancer, and ovarian cancer.  Other cancers have also been reported with Nguyen syndrome, such as urinary tract, pancreas, bile duct, and other cancers.     Based on her family history, Katy meets current National Comprehensive Cancer Network (NCCN) criteria for genetic testing of high-penetrance breast and/or ovarian cancer susceptibility genes, which often includes BRCA1, BRCA2, CDH1, PALB2, PTEN, and TP53 among others.     We discussed that there are additional genes that could cause increased risk for breast and ovarian cancer. As many of these genes present with overlapping features in a family and accurate cancer risk cannot always be established based upon the pedigree analysis alone, it would be reasonable for Katy to consider panel genetic testing to analyze multiple genes at once.  We reviewed genetic testing options for the cancers seen in Katy s family history that suggest a hereditary cause: a more targeted panel of genes associated with an increased risk for breast and gynecologic cancers or an expanded panel which would include additional genes associated with an increased risk for other types of cancer. Katy expressed an interest in more broad testing. She opted for an expanded custom panel (a combination of the [facility] Common Hereditary Cancers Panel + [facility] Melanoma Panel).  Genetic testing is available for 47 genes associated with cancers of the breast, ovary, uterus, prostate and gastrointestinal system: [facility] Common Hereditary Cancers panel (APC, GARTH, AXIN2, BARD1, BMPR1A, BRCA1, BRCA2, BRIP1, CDH1, CDK4, CDKN2A, CHEK2, CTNNA1, DICER1, EPCAM, GREM1, HOXB13, KIT, MEN1, MLH1, MSH2, MSH3, MSH6, MUTYH, NBN, NF1, NTHL1, PALB2, PDGFRA, PMS2, POLD1, POLE, PTEN,RAD50, RAD51C, RAD51D, SDHA, SDHB, SDHC, SDHD, SMAD4, SMARCA4, STK11, TP53,TSC1, TSC2, VHL).    We discussed  that many of these genes are associated with specific hereditary cancer syndromes and published management guidelines: Hereditary Breast and Ovarian Cancer syndrome (BRCA1, BRCA2), Nguyen syndrome (MLH1, MSH2, MSH6, PMS2, EPCAM), Familial Adenomatous Polyposis (APC), Hereditary Diffuse Gastric Cancer (CDH1), Familial Atypical Multiple Mole Melanoma syndrome (CDK4, CDKN2A), Multiple Endocrine Neoplasia type 1 (MEN1), Juvenile Polyposis syndrome (BMPR1A, SMAD4), Cowden syndrome (PTEN), Li Fraumeni syndrome (TP53), Hereditary Paraganglioma and Pheochromocytoma syndrome (SDHA, SDHB, SDHC, SDHD), Peutz-Jeghers syndrome (STK11), MUTYH Associated Polyposis (MUTYH), Tuberous sclerosis complex (TSC1, TSC2), Von Hippel-Lindau disease (VHL), and Neurofibromatosis type 1 (NF1).   The GARTH, AXIN2, BRIP1, CHEK2, GREM1, MSH3, NBN, NTHL1, PALB2, POLD1, POLE, RAD51C, and RAD51D genes are associated with increased cancer risk and have published management guidelines for certain cancers.   The remaining genes (BARD1, CTNNA1, DICER1, HOXB13, KIT, PDGFRA, RAD50, and SMARCA4) are associated with increased cancer risk and may allow us to make medical recommendations when mutations are identified.   As described above, Katy was also interested in adding additional genes associated with an increased risk for melanoma.  Consent was obtained and genetic testing for a custom panel (a combination of the [facility] Common Hereditary Cancers Panel + [facility] Melanoma Panel) was sent to [facility]. Turn around time: approximately 3-4 weeks.    Medical Management: For Katy, we reviewed that the information from genetic testing may determine:    additional cancer screening for which Katy may qualify (i.e. mammogram and breast MRI, more frequent colonoscopies, more frequent dermatologic exams, etc.),    options for risk reducing surgeries Katy could consider (i.e. bilateral mastectomy, surgery to remove her ovaries and/or uterus, etc.),      and targeted  chemotherapies if she were to develop certain cancers in the future (i.e. immunotherapy for individuals with Nguyen syndrome, PARP inhibitors, etc.).     These recommendations will be discussed in detail once genetic testing is completed.     Plan:  1) Today Katy elected to proceed with genetic testing via a custom panel (a combination of the [facility] Common Hereditary Cancers Panel + [facility]ae Melanoma Panel) offered by [facility].  2) This information should be available in 3-4 weeks.  3) Katy will receive a phone call to discuss the results.    Yessica Vernon MS, [facility]  Licensed Genetic Counselor  Office: [phone]

## 2020-01-22 NOTE — PROGRESS NOTES
"        GASTROENTEROLOGY FOLLOW UP CLINIC VISIT    CC/REFERRING MD:    Aura Rosario    REASON FOR CONSULTATION:  RECHECK (abdominal pain)    HISTORY OF PRESENT ILLNESS:    Katy Islas is 29 year old female who presents for follow up of abdominal pain. She notes abdominal pain since [place] time. She has also been having dysmenorrhea and is being followed by an OBGYN. She has requested to have a hysterectomy because she believes the hysterectomy is aggravating her \"crohn's\" disease. There has been question regarding this diagnosis of crohn's disease before. She has stated in the past that she was diagnosed with crohn's years ago and that her brother also has crohn's disease. She however is not on any medications for this. She was also evaluated with upper endoscopy and colonoscopy several months ago which did not show any evidence of an inflammatory bowel disease.    She has had multiple CT scans through the ED for her abdominal pain without cause to explain her continuous symptoms.       HPI obtained through        PREVIOUS ENDOSCOPY:    Colonoscopy 7/17/19  Impression:       - The examined portion of the ileum was normal.                             - No signs of IBD. Area of abnormality on the CT scan is normal endoscopically.                             - The transverse colon, ascending colon and cecum are normal.                             - One 4 mm polyp in the descending colon, removed with a cold snare. Resected and retrieved.                             - The rectum and sigmoid colon are normal.                             - Non-bleeding internal hemorrhoids. This may be the source of her rectal bleeding.                             - Anal papilla(e) were hypertrophied.     SPECIMEN(S):   A: Random colon biopsy   B: Descending colon biopsy     FINAL DIAGNOSIS:   A. RANDOM COLON BIOPSY:   - Colonic mucosa with no significant histologic abnormality   - Negative for " active inflammation and lymphocytic colitis     B. DESCENDING COLON POLYP, POLYPECTOMY:   - Hyperplastic polyp, negative for dysplasia     I have personally reviewed all specimens and/or slides, including the   listed special stains, and used them   with my medical judgement to determine or confirm the final diagnosis.     Electronically signed out by:     Victorino Bajwa M.D., [facility]     Upper Endoscopy 4/12/19  Impression:       - Esophagogastric landmarks identified.                             - Gastroesophageal flap valve classified as Hill Grade III (minimal fold, loose to endoscope, hiatal hernia likely).                             - Normal stomach.                             - Normal duodenal bulb, first portion of the duodenum and second portion of the duodenum.                             - Biopsies were taken with a cold forceps for Helicobacter pylori testing.                             - No findings to account for complaint of coffee ground emesis and hematemesis.       SPECIMEN(S):   Antral biopsy     FINAL DIAGNOSIS:   ANTRUM, BIOPSY:   - Gastric mucosa with no significant inflammation   - No H. pylori like organisms identified on routine staining   - Negative for intestinal metaplasia or dysplasia     I have personally reviewed all specimens and/or slides, including the listed special stains, and used them with my medical judgement to determine or confirm the final diagnosis.     Electronically signed out by:     Adrian Wagner M.D., [facility]             PERTINENT PAST MEDICAL HISTORY:    Past Medical History:   Diagnosis Date     Abdominal pain 10/31- 11/4/2005    [facility] admit for Crohn's     Allergic rhinitis, cause unspecified     Allergic rhinitis     Arthritis      C. difficile diarrhea     Past, no current diarrhea.     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, without mention of intractable epilepsy     pseudoseizures diagnosed after extensive neurologic eval     Migraine 07/21/12    D/C 07/22/12-[facility]     Migraine, unspecified, without mention of intractable migraine without mention of status migrainosus     Migraine     Mild intermittent asthma     mild intermittent     Mycoplasma infection in conditions classified elsewhere and of unspecified site      Other chronic pain     Back pain for 6 years     Renal disease     Kidney stones     Unspecified hearing loss     congenital hearing loss       PREVIOUS SURGERIES:   Past Surgical History:   Procedure Laterality Date     AS REMOVAL OF COCCYX  10/18/2017     C FUSION OF SACROILIAC JOINT  10/26/2018     COLONOSCOPY  7/1/2009    [facility].     COLONOSCOPY N/A 7/17/2019    Procedure: Colonoscopy, With Polypectomy And Biopsy;  Surgeon: Edwin Conde MD;  Location: [facility] OR     COLONOSCOPY WITH CO2 INSUFFLATION N/A 7/17/2019    Procedure: COLONOSCOPY, WITH CO2 INSUFFLATION;  Surgeon: Edwin Conde MD;  Location: [facility] OR     COMBINED ESOPHAGOSCOPY, GASTROSCOPY, DUODENOSCOPY (EGD) WITH CO2 INSUFFLATION N/A 4/12/2019    Procedure: COMBINED ESOPHAGOSCOPY, GASTROSCOPY, DUODENOSCOPY (EGD) WITH CO2 INSUFFLATION;  Surgeon: Edwin Conde MD;  Location: [facility] OR     ESOPHAGOSCOPY, GASTROSCOPY, DUODENOSCOPY (EGD), COMBINED N/A 4/12/2019    Procedure: Combined Esophagoscopy, Gastroscopy, Duodenoscopy (Egd), Biopsy Single Or Multiple;  Surgeon: Edwin Conde MD;  Location: [facility] OR     FUSION SPINE POSTERIOR MINIMALLY  INVASIVE ONE LEVEL N/A 2/23/2017    Procedure: FUSION SPINE POSTERIOR MINIMALLY INVASIVE ONE LEVEL;  L4-5 Oblique Lateral Lumbar Interbody Fusion   Epidural steroid injection.   Transpedicular Bone marrow aspiration;  Surgeon: Jeniffer Eugene MD;  Location: [facility] OR     HC COLONOSCOPY THRU STOMA WITH BIOPSY  10/29/2003    Impression is that of normal appearing colonoscopy, without evidence of rectal bleeding.     HC COLONOSCOPY THRU STOMA, DIAGNOSTIC  10/00    normal     HC COLONOSCOPY THRU STOMA, DIAGNOSTIC  Oct 2009    Dr López- normal     HC EEG AWAKE AND SLEEP      abnormal     HC MRI BRAIN W/O CONTRAST  12/00    normal     HC REMOVAL GALLBLADDER  8/5/2009    [facility], [place].     HC UGI ENDOSCOPY DIAG W BIOPSY  11/11/09    Normal esophagus     HC UGI ENDOSCOPY, SIMPLE EXAM  7/00, 10/00    mild chronic esophagitis and duodenitis, neg H pylori     HC UGI ENDOSCOPY, SIMPLE EXAM  01/20/2005    Esophagogastroduodenoscopy, colonoscopy with biopsies.  [facility] UGI ENDOSCOPY, SIMPLE EXAM  7/1/2009    [facility], [facility].      UGI ENDOSCOPY, SIMPLE EXAM  11/11/2009    attempted upper GI, pt. could not tolerate procedure:[facility]     ORTHOPEDIC SURGERY  October 19,2011    diskectomy L4-L5       ALLERGIES:     Allergies   Allergen Reactions     Iohexol Hives     Other reaction(s): Hives  IV contrast; patient states she tolerates contrast if she gets benadryl before  IV contrast; patient states she tolerates contrast if she gets benadryl before     Ativan [Lorazepam] Hives     At the IV site     Baclofen      hives     Bees Hives, Swelling and Difficulty breathing     Caffeine      Contrast Dye      Hives,   Updated 5/10/2016 CT Contrast.     Iodine Hives     Methocarbamol Swelling     Metoclopramide      Other reaction(s): Tremors  LW Reaction: shaking/sweating     Midazolam      Other reaction(s): Agitation     Monosodium Glutamate      Morphine Other (See  Comments)     Difficulty with urination     Nsaids Other (See Comments)     GI BLEED x2     Other (Do Not Use) Other (See Comments)     Xanaflex- pt becomes disoriented and loses bladder control     Reglan [Metoclopramide Hcl] Other (See Comments)     shaking     Soma [Carisoprodol] Visual Disturbance     Sleep walking     Tizanidine      Topamax Other (See Comments)     Topamax [Topiramate] Nausea     Tingling  GI/Vomit     Tramadol      Severe Headache, Seizure Risk     Tylenol W/Codeine [Acetaminophen-Codeine] Nausea and Itching     Tylenol 3     Valium Other (See Comments)     Becomes aggressive and angry     Versed Other (See Comments)     Zolmitriptan      Makes face feel like its twitching     Droperidol Anxiety     Flu Virus Vaccine Rash      Arm swelling       PERTINENT MEDICATIONS:    Current Outpatient Medications:      acetaminophen (TYLENOL) 500 MG tablet, Take 2 tablets (1,000 mg) by mouth 3 times daily as needed for mild pain, Disp: 100 tablet, Rfl: 3     albuterol (PROAIR HFA/PROVENTIL HFA/VENTOLIN HFA) 108 (90 Base) MCG/ACT inhaler, Inhale 2 puffs into the lungs every 6 hours as needed for shortness of breath / dyspnea or wheezing, Disp: 3 Inhaler, Rfl: 3     albuterol (PROVENTIL) (2.5 MG/3ML) 0.083% neb solution, Take 1 vial (2.5 mg) by nebulization every 6 hours as needed for shortness of breath / dyspnea or wheezing, Disp: 50 vial, Rfl: 11     alum & mag hydroxide-simethicone (MAALOX ADVANCED MAX ST) 400-400-40 MG/5ML SUSP suspension, Take 15 mLs by mouth every 4 hours as needed for indigestion (mix with lidocaine), Disp: 355 mL, Rfl: 11     ARIPiprazole (ABILIFY) 15 MG tablet, Take 1 tablet (15 mg) by mouth At Bedtime, Disp: 90 tablet, Rfl: 3     aspirin (ASA) 81 MG tablet, Take 1 tablet (81 mg) by mouth daily, Disp: 90 tablet, Rfl: 3     azelastine (OPTIVAR) 0.05 % ophthalmic solution, Place 1 drop into both eyes 2 times daily, Disp: 6 mL, Rfl: 11     busPIRone (BUSPAR) 15 MG tablet, Take 1  tablet (15 mg) by mouth 2 times daily, Disp: 60 tablet, Rfl: 5     cyclobenzaprine (FLEXERIL) 10 MG tablet, Take 1 tablet (10 mg) by mouth 3 times daily as needed for muscle spasms or other (back pain), Disp: 90 tablet, Rfl: 3     diphenhydrAMINE (BENADRYL) 50 MG capsule, Take 1-2 capsules ( mg) by mouth every 6 hours as needed for itching, allergies or sleep, Disp: 90 capsule, Rfl: 3     DULoxetine (CYMBALTA) 60 MG EC capsule, Take 1 capsule (60 mg) by mouth daily, Disp: 90 capsule, Rfl: 3     EPINEPHrine (EPIPEN/ADRENACLICK/OR ANY BX GENERIC EQUIV) 0.3 MG/0.3ML injection 2-pack, Inject 0.3 mLs (0.3 mg) into the muscle once as needed for anaphylaxis, Disp: 0.6 mL, Rfl: 3     fexofenadine (ALLEGRA) 180 MG tablet, Take 1 tablet (180 mg) by mouth daily, Disp: 90 tablet, Rfl: 3     gabapentin (NEURONTIN) 300 MG capsule, TAKE 1 TO 3 CAPSULES(300  MG) BY MOUTH THREE TIMES DAILY, Disp: 270 capsule, Rfl: 0     hydrocortisone (ANUSOL-HC) 2.5 % cream, Place rectally 2 times daily as needed for hemorrhoids, Disp: 30 g, Rfl: 1     hyoscyamine (ANASPAZ/LEVSIN) 0.125 MG tablet, Take 1 tablet (125 mcg) by mouth every 4 hours as needed for cramping, Disp: 60 tablet, Rfl: 1     lidocaine (XYLOCAINE) 2 % solution, Swish and swallow 15 mLs in mouth every 4 hours as needed for other (GERD) ; Max 8 doses/24 hour period. Mix with 15 mLs Maalox or Mylanta., Disp: 100 mL, Rfl: 3     losartan (COZAAR) 100 MG tablet, Take 1 tablet (100 mg) by mouth daily, Disp: 90 tablet, Rfl: 3     medical cannabis (Patient's own supply), (The purpose of this order is to document that the patient reports taking medical cannabis.  This is not a prescription, and is not used to certify that the patient has a qualifying medical condition.), Disp: 0 Information only, Rfl: 0     medroxyPROGESTERone (DEPO-PROVERA) 150 MG/ML IM injection, Inject 1 mL (150 mg) into the muscle every 3 months, Disp: 1 mL, Rfl: 3     medroxyPROGESTERone (DEPO-PROVERA) 150  MG/ML IM injection, Inject 1 mL (150 mg) into the muscle every 3 months, Disp: 3 mL, Rfl: 3     methocarbamol (ROBAXIN) 750 MG tablet, Take 1 tablet (750 mg) by mouth 3 times daily as needed for muscle spasms, Disp: 60 tablet, Rfl: 3     metoprolol succinate ER (TOPROL-XL) 200 MG 24 hr tablet, Take 1 tablet (200 mg) by mouth daily, Disp: 90 tablet, Rfl: 1     NONFORMULARY, Apply 30 mLs topically 4 times daily, Disp: , Rfl:      ondansetron (ZOFRAN-ODT) 8 MG ODT tab, Take 1 tablet (8 mg) by mouth every 8 hours as needed for nausea, Disp: 45 tablet, Rfl: 3     oxyCODONE (ROXICODONE) 5 MG/5ML solution, Take 5-10 mLs (5-10 mg) by mouth every 6 hours as needed for pain (max six 5 ML doses/day, total daily dose max 30 mg) (two week supply), Disp: 420 mL, Rfl: 0     pantoprazole (PROTONIX) 40 MG EC tablet, Take 1 tablet (40 mg) by mouth 2 times daily Take 30-60 minutes before a meal., Disp: 180 tablet, Rfl: 3     prazosin (MINIPRESS) 1 MG capsule, Take 1 mg by mouth At Bedtime, Disp: , Rfl: 5     rizatriptan (MAXALT-MLT) 5 MG ODT, Take 1-2 tablets (5-10 mg) by mouth at onset of headache for migraine May repeat in 2 hours. Max 6 tablets/24 hours., Disp: 18 tablet, Rfl: 3     sucralfate (CARAFATE) 1 GM tablet, Take 1 tablet (1 g) by mouth 4 times daily as needed (heartburn), Disp: 360 tablet, Rfl: 3     valACYclovir (VALTREX) 1000 mg tablet, 2 tabs at onset of mouth sores.  Repeat dose in 12 hours., Disp: , Rfl:     Current Facility-Administered Medications:      medroxyPROGESTERone (DEPO-PROVERA) injection 150 mg, 150 mg, Intramuscular, Q90 Days, Aura Rosario PA-C, 150 mg at 01/22/20 0909     medroxyPROGESTERone (DEPO-PROVERA) injection 150 mg, 150 mg, Intramuscular, Q90 Days, Aura Rosario PA-C, 150 mg at 08/05/19 1131     medroxyPROGESTERone (DEPO-PROVERA) syringe 150 mg, 150 mg, Intramuscular, Q90 Days, Monique Berumen DO    FAMILY HISTORY:   Family History   Problem Relation Age  of Onset     Gastrointestinal Disease Brother         severe Crohn's     Neurologic Disorder Brother         Seizures post head injury     Depression Brother      Substance Abuse Brother      Genitourinary Problems Father         kidney stones     Diabetes Father      Heart Disease Father         Open heart surgery     Breast Cancer Maternal Grandmother      Parkinsonism Maternal Grandmother      Cerebrovascular Disease Paternal Grandmother      Cancer Maternal Grandfather         Lung     Cardiovascular Paternal Grandfather         Heart Attack     Substance Abuse Mother         in recovery x1 year      Lung Cancer Paternal Uncle      Cancer Paternal Uncle      Lung Cancer Maternal Uncle         ROS:    No fevers or chills  No weight loss  No blurry vision, double vision or change in vision  No sore throat  No shortness of breath or wheezing  No chest pain or pressure  No arthralgias or myalgias  No rashes or skin changes  No odynophagia or dysphagia  No BRBPR, hematochezia, melena  No dysuria, frequency or urgency  No hot/cold intolerance or polyria  No anxiety or depression  PHYSICAL EXAMINATION:  Constitutional: aaox3, cooperative, pleasant, not dyspneic/diaphoretic, no acute distress  Vitals reviewed: /74 (BP Location: Left arm, Patient Position: Sitting, Cuff Size: Adult Regular)   Pulse 111   Wt 82.9 kg (182 lb 11.2 oz)   LMP 01/01/2020   SpO2 100%   BMI 33.42 kg/m     Wt:   Wt Readings from Last 2 Encounters:   01/22/20 82.9 kg (182 lb 11.2 oz)   01/16/20 82.8 kg (182 lb 8 oz)          Eyes: Sclera anicteric/injected  Ears/nose/mouth/throat: Normal oropharynx without ulcers or exudate, mucus membranes moist  Neck: supple  CV: No edema  Respiratory: Unlabored breathing  Abd: Nondistended, no masses, +bs, no hepatosplenomegaly, nontender, no peritoneal signs  Skin: warm, perfused, no jaundice  Psych: Normal affect  MSK: Normal gait      PERTINENT STUDIES:   Most recent CBC:  Recent Labs   Lab Test  12/12/19  1320 08/21/19  0021   WBC 7.4 5.9   HGB 12.5 15.0   HCT 37.1 41.9    283     Most recent hepatic panel:  Recent Labs   Lab Test 01/06/20 12/29/19   ALT 27 21   AST 23 16     Most recent creatinine:  Recent Labs   Lab Test 01/06/20 12/29/19   CR 0.97 0.83       RADIOLOGY:      EXAM: CT ABDOMEN AND PELVIS (stone protocol -- without contrast)     DATE: 1/6/2020 11:20 PM    COMPARISON: None submitted.    CLINICAL DATA: Increasing left-sided flank pain.    ADDITIONAL CLINICAL DATA:    TECHNIQUE: This examination was tailored for evaluation of urinary tract stones.  Unenhanced   contiguous transaxial images images were obtained through the abdomen and pelvis.  Coronal   reformations were also obtained through the abdomen and pelvis.    FINDINGS:    RIGHT SIDE:    Kidney: Tiny nonobstructing right renal stones.    Ureter: No stones in the right ureter.     Urinary Tract: No right-sided hydronephrosis or ureteral dilatation.     LEFT SIDE:     Kidney: No stones in the left kidney.    Ureter: No stones in the left ureter.      Urinary Tract: No left-sided hydronephrosis or ureteral dilatation.     OTHER:    Other solid organs: No gross abnormality of the liver, spleen, pancreas, or adrenal glands on   these limited unenhanced images.    Gallbladder: Surgically absent.    Urinary Bladder:  Decompressed.      Nodes: No enlarged lymph nodes.    Fluid: No free fluid.    Aorta: No abdominal aortic aneurysm.    Bowel: Moderate amount of debris in the stomach. No small bowel dilatation. Large amount of   stool in the colon. No pericolonic inflammatory change.    Other: Cardiomegaly. Surgical hardware noted at L4 and L5 as well as across the left sacroiliac   joint.      IMPRESSION:    Tiny nonobstructing right renal stones. No left renal stones. No ureteral stones bilaterally.   No hydronephrosis or ureteral dilatation bilaterally.      REPORT SIGNED BY DR. Anam Dennis    Electronically Signed By: Anam  Montgomery on 1/6/2020 11:51 PM      EXAM: CT ABDOMEN & PELVIS W/O ORAL W IV CON    DATE: 12/6/2019 3:23 AM    COMPARISON: September 28, 2019    CLINICAL DATA: lower abd pain h/o crohns.     ICD 10:    TECHNIQUE: Thin-section contiguous transaxial images were obtained through the abdomen and pelvis with intravenous contrast.  No oral contrast was given.  Coronal reformations were also obtained through the abdomen and pelvis.  A total of 100 cc of Omnipaque 350 was administered intravenously for this study.    FINDINGS:     ABDOMEN:  Enhancement pattern of the liver, spleen, pancreas, adrenals and kidneys is normal. Large volume food stuffs in the stomach. Moderate volume stool in the right, transverse and left colon. No small bowel dilation. Surgical clips in the gallbladder fossa. Streak artifact related to L4-5 fusion hardware as well as hardware bridging the left sacroiliac joint.    PELVIS:  No bowel distention. No focal bowel wall thickening. No pelvic free fluid. No bladder stones or ureteral dilation.   Other Result Information   Interface, Nmradordrslt In - 12/06/2019  3:53 AM CST  EXAM: CT ABDOMEN & PELVIS W/O ORAL W IV CON    DATE: 12/6/2019 3:23 AM    COMPARISON: September 28, 2019    CLINICAL DATA: lower abd pain h/o crohns.     ICD 10:    TECHNIQUE: Thin-section contiguous transaxial images were obtained through the abdomen and pelvis with intravenous contrast.  No oral contrast was given.  Coronal reformations were also obtained through the abdomen and pelvis.  A total of 100 cc of Omnipaque 350 was administered intravenously for this study.    FINDINGS:     ABDOMEN:  Enhancement pattern of the liver, spleen, pancreas, adrenals and kidneys is normal. Large volume food stuffs in the stomach. Moderate volume stool in the right, transverse and left colon. No small bowel dilation. Surgical clips in the gallbladder fossa. Streak artifact related to L4-5 fusion hardware as well as hardware bridging the left  sacroiliac joint.    PELVIS:  No bowel distention. No focal bowel wall thickening. No pelvic free fluid. No bladder stones or ureteral dilation.    IMPRESSION  IMPRESSION:     1.  Study is negative for acute intra-abdominal inflammation.  2.  Cholecystectomy.  3.  Unchanged appearance of the L4-5 and left sacroiliac joint fusion hardware.  4.  No findings of bowel obstruction.       REPORT SIGNED BY DR. Joshua Medel            ASSESSMENT/PLAN:    Katy Islas is a 29 year old female who presents for follow up.     She is very frustrated with her symptoms. She has had multiple work up from GI standpoint without cause or explanation for her pain. Reviewed previous work up today including EGD/colonoscopy and multiple imaging scans. Patient has stated that she has crohn's disease however her work up has not reflected any signs of crohn's or inflammatory bowel disease. Her symptoms are likely related to an IBS-D. We have tried antispasmodics for her symptoms in the past including bentyl and hyoscyamine without much relief. Reviewed medication option of xifaxan for 14 day course. Patient is interested in trying this. Rx sent to pharmacy.     She mentions that she is also planning to have a hysterectomy because she feels that this is making her symptoms worse. She also questions if endometriosis could be causing her symptoms. Advised patient to ask/discuss with her OBGYN.       Irritable bowel syndrome with diarrhea  - rifaximin (XIFAXAN) 550 MG TABS tablet  Dispense: 42 tablet; Refill: 0  Abdominal cramping    Thank you for this consultation.  It was a pleasure to participate in the care of this patient; please contact us with any further questions.  A total of 25 minutes, face to face, was spent with this patient, >50% of which was counseling regarding the above delineated issues.    This note was created with voice recognition software, and while reviewed for accuracy, typos may remain.     Junaid Pritchett  [facility]  Gastroenterology   [facility]

## 2020-01-22 NOTE — PATIENT INSTRUCTIONS
Assessing Cancer Risk  Only about 5-10% of cancers are thought to be due to an inherited cancer susceptibility gene.    These families often have:    Several people with the same or related types of cancer    Cancers diagnosed at a young age (before age 50)    Individuals with more than one primary cancer    Multiple generations of the family affected with cancer    Some people may be candidates for genetic testing of more than one gene.  For these families, genetic testing using a cancer panel may be offered.  These panels will test different genes known to increase the risk for breast, ovarian, uterine, and/or other cancers. All of the genes discussed below have published clinical management guidelines for individuals who are found to carry a mutation. The purpose of this handout is to serve as a brief summary of the genes analyzed by the panels used to inquire about hereditary breast and gynecologic cancer:  GARTH, BRCA1, BRCA2, BRIP1, CDH1, CHEK2, MLH1, MSH2, MSH6, PMS2, EPCAM, PTEN, PALB2, RAD51C, RAD51D, and TP53.  ______________________________________________________________________________  Hereditary Breast and Ovarian Cancer Syndrome   (BRCA1 and BRCA2)  A single mutation in one of the copies of BRCA1 or BRCA2 increases the risk for breast and ovarian cancer, among others.  The risk for pancreatic cancer and melanoma may also be slightly increased in some families.  The chart below shows the chance that someone with a BRCA mutation would develop cancer in his or her lifetime1,2,3,4.        A person s ethnic background is also important to consider, as individuals of Ashkenazi [place] ancestry have a higher chance of having a BRCA gene mutation.  There are three BRCA mutations that occur more frequently in this population.    Nguyen Syndrome   (MLH1, MSH2, MSH6, PMS2, and EPCAM)  Currently five genes are known to cause Nguyen Syndrome: MLH1, MSH2, MSH6, PMS2, and EPCAM.  A single mutation in one of the  Nguyen Syndrome genes increases the risk for colon, endometrial, ovarian, and stomach cancers.  Other cancers that occur less commonly in Nguyen Syndrome include urinary tract, skin, and brain cancers.  The chart below shows the chance that a person with Nguyen syndrome would develop cancer in his or her lifetime5.      *Cancer risk varies depending on Nguyen syndrome gene found    Cowden Syndrome   (PTEN)  Cowden syndrome is a hereditary condition that increases the risk for breast, thyroid, endometrial, colon, and kidney cancer.  Cowden syndrome is caused by a mutation in the PTEN gene.  A single mutation in one of the copies of PTEN causes Cowden syndrome and increases cancer risk.  The chart below shows the chance that someone with a PTEN mutation would develop cancer in their lifetime6,7.  Other benign features seen in some individuals with Cowden syndrome include benign skin lesions (facial papules, keratoses, lipomas), learning disability, autism, thyroid nodules, colon polyps, and larger head size.      *One recent study found breast cancer risk to be increased to 85%    Li-Fraumeni Syndrome   (TP53)  Li-Fraumeni Syndrome (LFS) is a cancer predisposition syndrome caused by a mutation in the TP53 gene. A single mutation in one of the copies of TP53 increases the risk for multiple cancers. Individuals with LFS are at an increased risk for developing cancer at a young age. The lifetime risk for development of a LFS-associated cancer is 50% by age 30 and 90% by age 60.   Core Cancers: Sarcomas, Breast, Brain, Lung, Leukemias/Lymphomas, Adrenocortical carcinomas  Other Cancers: Gastrointestinal, Thyroid, Skin, Genitourinary    Hereditary Diffuse Gastric Cancer   (CDH1)  Currently, one gene is known to cause hereditary diffuse gastric cancer (HDGC): CDH1.  Individuals with HDGC are at increased risk for diffuse gastric cancer and lobular breast cancer. Of people diagnosed with HDGC, 30-50% have a mutation in the CDH1  gene.  This suggests there are likely other genes that may cause HDGC that have not been identified yet.      Lifetime Cancer Risks    General Population HDGC    Diffuse Gastric  <1% ~80%   Breast 12% 39-52%         Additional Genes  GARTH  GARTH is a moderate-risk breast cancer gene. Women who have a mutation in GARTH can have between a 2-4 fold increased risk for breast cancer compared to the general population8. GARTH mutations have also been associated with increased risk for pancreatic cancer, however an estimate of this cancer risk is not well understood9. Individuals who inherit two GARTH mutations have a condition called ataxia-telangiectasia (AT).  This rare autosomal recessive condition affects the nervous system and immune system, and is associated with progressive cerebellar ataxia beginning in childhood.  Individuals with ataxia-telangiectasia often have a weakened immune system and have an increased risk for childhood cancers.    PALB2  Mutations in PALB2 have been shown to increase the risk of breast cancer up to 33-58% in some families; where individuals fall within this risk range is dependent upon family [id number]. PALB2 mutations have also been associated with increased risk for pancreatic cancer, although this risk has not been quantified yet.  Individuals who inherit two PALB2 mutations--one from their mother and one from their father--have a condition called Fanconi Anemia.  This rare autosomal recessive condition is associated with short stature, developmental delay, bone marrow failure, and increased risk for childhood cancers.    CHEK2   CHEK2 is a moderate-risk breast cancer gene.  Women who have a mutation in CHEK2 have around a 2-fold increased risk for breast cancer compared to the general population, and this risk may be higher depending upon family history.11,12,13 Mutations in CHEK2 have also been shown to increase the risk of a number of other cancers, including colon and prostate, however  these cancer risks are currently not well understood.    BRIP1, RAD51C and RAD51D  Mutations in BRIP1, RAD51C, and RAD51D have been shown to increase the risk of ovarian cancer and possibly female breast cancer as well14,15 .       Lifetime Cancer Risk    General Population BRIP1 RAD51C RAD51D   Ovarian 1-2% ~5-8% ~5-9% ~7-15%           Inheritance  All of the cancer syndromes reviewed above are inherited in an autosomal dominant pattern.  This means that if a parent has a mutation, each of his or her children will have a 50% chance of inheriting that same mutation.  Therefore, each child--male or female--would have a 50% chance of being at increased risk for developing cancer.      Image obtained from Genetics Home Reference, 2013     Mutations in some genes can occur de kimberly, which means that a person s mutation occurred for the first time in them and was not inherited from a parent.  Now that they have the mutation, however, it can be passed on to future generations.    Genetic Testing  Genetic testing involves a blood test and will look at the genetic information in the GARTH, BRCA1, BRCA2, BRIP1, CDH1, CHEK2, MLH1, MSH2, MSH6, PMS2, EPCAM, PTEN, PALB2, RAD51C, RAD51D, and TP53 genes for any harmful mutations that are associated with increased cancer risk.  If possible, it is recommended that the person(s) who has had cancer be tested before other family members.  That person will give us the most useful information about whether or not a specific gene is associated with the cancer in the family.    Results  There are three possible results of genetic testing:    Positive--a harmful mutation was identified in one or more of the genes    Negative--no mutation was identified in any of the genes on this panel    Variant of unknown significance--a variation in one of the genes was identified, but it is unclear how this impacts cancer risk in the family    Advantages and Disadvantages   There are advantages and  disadvantages to genetic testing.    Advantages    May clarify your cancer risk    Can help you make medical decisions    May explain the cancers in your family    May give useful information to your family members (if you share your results)    Disadvantages    Possible negative emotional impact of learning about inherited cancer risk    Uncertainty in interpreting a negative test result in some situations    Possible genetic discrimination concerns (see below)    Genetic Information Nondiscrimination Act (JAYNE)  JAYNE is a federal law that protects individuals from health insurance or employment discrimination based on a genetic test result alone.  Although rare, there are currently no legal discrimination protections in terms of life insurance, long term care, or disability insurances.  Visit the [employer] website to learn more.    Reducing Cancer Risk  All of the genes described above have nationally recognized cancer screening guidelines that would be recommended for individuals who test positive.  In addition to increased cancer screening, surgeries may be offered or recommended to reduce cancer risk.  Recommendations are based upon an individual s genetic test result as well as their personal and family history of cancer.    Questions to Think About Regarding Genetic Testing:    What effect will the test result have on me and my relationship with my family members if I have an inherited gene mutation?  If I don t have a gene mutation?    Should I share my test results, and how will my family react to this news, which may also affect them?    Are my children ready to learn new information that may one day affect their own health?    Hereditary Cancer Resources    FORCE: Facing Our Risk of Cancer Empowered facingourrisk.org   Bright Pink bebrightpink.org   Li-Fraumeni Syndrome Association lfsassociation.org   PTEN World PTENworld.com   No stomach for cancer, Inc.  nostomachforcancer.org   Stomach cancer relief network Scrnet.org   Collaborative Group of the Americas on Inherited Colorectal Cancer (CGA) cgaicc.com    Cancer Care cancercare.org   American Cancer Society (ACS) cancer.org   National Cancer Pearl (NCI) cancer.gov     Please call us if you have any questions or concerns.   Cancer Risk Management Program [phone]-CANCER ([phone])  ? Brandyn Smith, MS, formerly [facility] [phone]  ? Louise Byrd, MS, formerly [facility]  [phone]  ? Sally Anderson, MS, formerly [facility]  [phone]  ? Marianela Ribeiro, MS, formerly [facility] [phone]  ? Glendy Estrellita, MS, formerly [facility] [phone]  ? Angela Park, MS, formerly [facility]  [phone]  ? Yessica Vernon, MS, formerly [facility]  [phone]    References  1. Debbi ORTIZ, Bailee PDP, Brian S, Chandler GALICIA, J Carlos JE, Alexia JL, Sarita N, Scott H, Christiana O, Chuckie A, Navidini B, Radiarsen P, Manfadykirika S, Nati DM, Moulton N, Annie E, Angela H, Uriah E, Humera J, Gronsara J, Elizabeth B, Seleneus H, Thorlacius S, Eerola H, Nevrikalinna H, Rosa Isela K, Clare OP. Average risks of breast and ovarian cancer associated with BRCA1 or BRCA2 mutations detected in case series unselected for family history: a combined analysis of 222 studies. Am J Hum Gretel. 2003;72:1117-30.  2. Catalina N, Jada M, Choi G.  BRCA1 and BRCA2 Hereditary Breast and Ovarian Cancer. Gene Reviews online. 2013.  3. Silverio YC, Felix S, Rachel G, Morgan S. Breast cancer risk among male BRCA1 and BRCA2 mutation carriers. J Natl Cancer Inst. 2007;99:1811-4.  4. Jeremiah JACQUES, Varun I, Ron J, Amber E, Miguelangel ER, Sakshi F. Risk of breast cancer in male BRCA2 carriers. J Med Gretel. 2010;47:710-1.  5. National Comprehensive Cancer Network. Clinical practice guidelines in oncology, colorectal cancer screening. Available online (registration required). 2015.  6. Yuan ZAPIEN, Norberto J, Marcelino J, Panfilo LA, Sigrid BARRETT, Alexis C. Lifetime cancer risks in individuals with germline PTEN mutations. Clin Cancer Res. 2012;18:400-7.  7. Pilarski R. Cowden  Syndrome: A Critical Review of the Clinical Literature. J Gretel . 2009:18:13-27.  8. Deepthi A, Juan D, Franco S, Kat P, Rip T, Kalpana M, Jose M B, Jennifer H, Salvador R, Jaime K, Ellie L, Jeremiah DG, Nati D, Paul DF, Bertrand MR, The Breast Cancer Susceptibility Collaboration (UK) & Michael ROBERTS. GARTH mutations that cause ataxia-telangiectasia are breast cancer susceptibility alleles. Nature Genetics. 2006;38:873-875  9. Ronnie N , Jazmine Y, Lakshmi J, Paula L, Braden GM , Margret ML, Gallinger S, Hopkins AG, Syngal S, Raegan ML, Tanesha J , Ansley R, Gladis SZ, Karin JR, Maryuri VE, Rose M, Voenedina B, Silvestre N, Miroslava RH, Vilma KW, and Tian AP. GARTH mutations in patients with hereditary pancreatic cancer. Cancer Discover. 2012;2:41-46  10. Debbi MERCHANT, et al. Breast-Cancer Risk in Families with Mutations in PALB2. NEJM. 2014; 371(6):497-506.  11. CHEK2 Breast Cancer Case-Control Consortium. CHEK2*1100delC and susceptibility to breast cancer: A collaborative analysis involving 10,860 breast cancer cases and 9,065 controls from 10 studies. Am J Hum Gretel, 74 (2004), pp. 8547-0145  12. Papi T, Karissa S, Rebekah K, et al. Spectrum of Mutations in BRCA1, BRCA2, CHEK2, and TP53 in Families at High Risk of Breast Cancer. VASYL. 2006;295(12):2376-8793.   13. James C, uSsan D, Ulysses A, et al. Risk of breast cancer in women with a CHEK2 mutation with and without a family history of breast cancer. J Clin Oncol. 2011;29:3519-3127.  14. Dedrick H, Ninoska E, Hema SJ, et al. Contribution of germline mutations in the RAD51B, RAD51C, and RAD51D genes to ovarian cancer in the population. J Clin Oncol. 2015;33(26):4768-8772. Doi:10.1200/JCO.2015.61.2408.  15. Carlos T, Edd MUNGUIA, Crissy P, et al. Mutations in BRIP1 confer high risk of ovarian cancer. Sammi Gretel. 2011;43(11):5068-3126. doi:10.1038/ng.955.

## 2020-01-22 NOTE — LETTER
RE: Katy Islas  [address]  [place] [address]     Dear Colleague,    Thank you for referring your patient, Katy Islas, to the [facility]. Please see a copy of my visit note below.    1/22/2020    Referring Provider: Aura Rosario PA-C    Presenting Information:   I met with Katy Islas today for genetic counseling at the Cancer Risk Management Program at the [facility] to discuss her family history of cancer.  She is here today to review this history, cancer screening recommendations, and available genetic testing options.    Personal History:  Katy is a 29 year old female. She does not have any personal history of cancer.      She had her first menstrual period at age 11 and is premenopausal. She does not have any children. Katy has her ovaries, fallopian tubes and uterus in place. She is considering hysterectomy and would like to use her genetic testing results for final surgical planning, specifically whether or not she should have her ovaries removed. She had a pelvic ultrasound on 12/29/19 which was normal. She uses the Depo Provera shot. She has not yet had any breast imaging due to her age. Katy has Crohn's disease and has had a number of colonoscopies and upper endoscopies. Her most recent colonoscopy on 7/17/19 detected one hyperplastic polyp. Prior colonoscopies/sigmoidoscopies in 2018, 2012, 2011, 2009, and 2005 detected no polyps. Upper endoscopies in 2019, 2018, 2012, 2009, and 2005 also detected no polyps. She has had multiple skin biopsies. On 7/17/17 skin biopsies on her mid back, right lower back, and left upper back all showed dysplastic compound nevus with mild atypia. On 8/28/17 a skin biopsy on her right lower leg revealed an intradermal nevus without atypia and a biopsy on her center low back revealed a compound nevus without atypia. Skin biopsies on 7/6/18 showed an intradermal melanocytic nevus (left  forearm), a compound dysplastic nevus with mild atypia (left shoulder), an intradermal melanocytic nevus (right elbow), and a compound dysplastic nevus with mild atypia (right flank). She had a second excision of the left forearm biopsy and left shoulder biopsy on 7/23/18. She reports that she did have a lot of sun exposure in the past. Katy reported current tobacco use and no alcohol use.    Family History: (Please see scanned pedigree for detailed family history information)  Siblings:  She has one brother who is 32 years old with no known history of cancer. He also has Crohn's disease.  Maternal:  Her mother is 69 years old. She had an ovarian tumor removed in 2008, and Katy is unsure if this was a cancer. She reports that her mother was very private about this.   Her maternal aunt is in her 60s and was diagnosed with breast cancer at age 50. She had a bilateral mastectomy. She reportedly had negative genetic testing approximately 10 years ago (unknown which genes were tested).   Her maternal grandmother was diagnosed with breast cancer at an unknown age. She passed away at age 91.    Her great-uncle (grandmother's brother) was diagnosed with prostate cancer and passed away in his 70s.   Her maternal grandfather was diagnosed with lung cancer. He had a history of smoking.  Paternal:  Her father had a history of multiple skin cancers (unknown types). He passed away at age 58. He had a lot of sun exposure as well as agent orange exposure.   Her paternal aunt has no known history of cancer, although Katy reports that she has limited contact with her paternal relatives.  This aunt's daughter (Katy's cousin) had a daughter (Katy's first cousin once removed) who passed away at age 25 due to ovarian cancer.    Her maternal ethnicity is Haitian. Her paternal ethnicity is unknown. There is no known Ashkenazi Hindu ancestry on either side of her family.     Discussion:    Katy's family history of cancer is suggestive  of a hereditary cancer syndrome.    We reviewed the features of sporadic, familial, and hereditary cancers. In looking at Katy's family history, it is possible that a cancer susceptibility gene is present due to her maternal family history of breast cancer and possibly ovarian cancer, as well as her paternal family history of ovarian cancer.    We discussed the BRCA1 and BRCA2 genes. Mutations in these genes cause a condition known as Hereditary Breast and Ovarian Cancer syndrome (HBOC). Women with a mutation in either of these genes are at increased risk for breast and ovarian cancer. There is also an increased risk for a second primary breast cancer. Men with a mutation in either of these genes are at increased risk for breast and prostate cancer. Both women and men may also be at increased risk for pancreatic cancer and melanoma. A detailed handout regarding these genes and other genes in which mutations are associated with an increased risk for breast cancer was provided to Katy at the end of our appointment today and can be found in the after visit summary. Topics included: inheritance pattern, cancer risks, cancer screening recommendations, and also risks, benefits and limitations of testing.    Katy also asked about Nguyen syndrome. We discussed that Nguyen syndrome can be caused by a mutation in one of five genes:  MLH1, MSH2, MSH6, PMS2, and EPCAM.  The highest cancer risks associated with Nguyen syndrome include colon cancer, endometrial/uterine cancer, gastric cancer, and ovarian cancer.  Other cancers have also been reported with Nguyen syndrome, such as urinary tract, pancreas, bile duct, and other cancers.     Based on her family history, Katy meets current National Comprehensive Cancer Network (NCCN) criteria for genetic testing of high-penetrance breast and/or ovarian cancer susceptibility genes, which often includes BRCA1, BRCA2, CDH1, PALB2, PTEN, and TP53 among others.     We discussed that there  are additional genes that could cause increased risk for breast and ovarian cancer. As many of these genes present with overlapping features in a family and accurate cancer risk cannot always be established based upon the pedigree analysis alone, it would be reasonable for Katy to consider panel genetic testing to analyze multiple genes at once.  We reviewed genetic testing options for the cancers seen in Katy s family history that suggest a hereditary cause: a more targeted panel of genes associated with an increased risk for breast and gynecologic cancers or an expanded panel which would include additional genes associated with an increased risk for other types of cancer. Katy expressed an interest in more broad testing. She opted for an expanded custom panel (a combination of the [facility] Common Hereditary Cancers Panel + [facility] Melanoma Panel).  Genetic testing is available for 47 genes associated with cancers of the breast, ovary, uterus, prostate and gastrointestinal system: [facility] Common Hereditary Cancers panel (APC, GARTH, AXIN2, BARD1, BMPR1A, BRCA1, BRCA2, BRIP1, CDH1, CDK4, CDKN2A, CHEK2, CTNNA1, DICER1, EPCAM, GREM1, HOXB13, KIT, MEN1, MLH1, MSH2, MSH3, MSH6, MUTYH, NBN, NF1, NTHL1, PALB2, PDGFRA, PMS2, POLD1, POLE, PTEN,RAD50, RAD51C, RAD51D, SDHA, SDHB, SDHC, SDHD, SMAD4, SMARCA4, STK11, TP53,TSC1, TSC2, VHL).    We discussed that many of these genes are associated with specific hereditary cancer syndromes and published management guidelines: Hereditary Breast and Ovarian Cancer syndrome (BRCA1, BRCA2), Nguyen syndrome (MLH1, MSH2, MSH6, PMS2, EPCAM), Familial Adenomatous Polyposis (APC), Hereditary Diffuse Gastric Cancer (CDH1), Familial Atypical Multiple Mole Melanoma syndrome (CDK4, CDKN2A), Multiple Endocrine Neoplasia type 1 (MEN1), Juvenile Polyposis syndrome (BMPR1A, SMAD4), Cowden syndrome (PTEN), Li Fraumeni syndrome (TP53), Hereditary Paraganglioma and Pheochromocytoma syndrome (SDHA, SDHB,  SDHC, SDHD), Peutz-Jeghers syndrome (STK11), MUTYH Associated Polyposis (MUTYH), Tuberous sclerosis complex (TSC1, TSC2), Von Hippel-Lindau disease (VHL), and Neurofibromatosis type 1 (NF1).   The GARTH, AXIN2, BRIP1, CHEK2, GREM1, MSH3, NBN, NTHL1, PALB2, POLD1, POLE, RAD51C, and RAD51D genes are associated with increased cancer risk and have published management guidelines for certain cancers.   The remaining genes (BARD1, CTNNA1, DICER1, HOXB13, KIT, PDGFRA, RAD50, and SMARCA4) are associated with increased cancer risk and may allow us to make medical recommendations when mutations are identified.   As described above, Katy was also interested in adding additional genes associated with an increased risk for melanoma.  Consent was obtained and genetic testing for a custom panel (a combination of the [facility] Common Hereditary Cancers Panel + [facility] Melanoma Panel) was sent to [facility]. Turn around time: approximately 3-4 weeks.    Medical Management: For Katy, we reviewed that the information from genetic testing may determine:    additional cancer screening for which Katy may qualify (i.e. mammogram and breast MRI, more frequent colonoscopies, more frequent dermatologic exams, etc.),    options for risk reducing surgeries Katy could consider (i.e. bilateral mastectomy, surgery to remove her ovaries and/or uterus, etc.),      and targeted chemotherapies if she were to develop certain cancers in the future (i.e. immunotherapy for individuals with Nguyen syndrome, PARP inhibitors, etc.).     These recommendations will be discussed in detail once genetic testing is completed.     Plan:  1) Today Katy elected to proceed with genetic testing via a custom panel (a combination of the [facility] Common Hereditary Cancers Panel + [facility] Melanoma Panel) offered by [facility].  2) This information should be available in 3-4 weeks.  3) Katy will receive a phone call to discuss the results.    Face to face time: 60  minutes    Yessica Vernon MS, [facility]  Licensed Genetic Counselor  Office: [phone]

## 2020-01-22 NOTE — PATIENT INSTRUCTIONS
Thank you for visiting our Gastroenterology office today.     Start taking xifaxan 550mg three times daily for 14 days for irritable bowel syndrome - diarrhea.

## 2020-01-22 NOTE — NURSING NOTE
Katy Islas's goals for this visit include:   Chief Complaint   Patient presents with     RECHECK     On and off severe abdominal pain since Christmas; patient is discussing having a hysterectomy and was told to get approval from a GI standpoint; abdominal cramping is worse during menstral cycle which is not normal for patient; patient states this morning while trying to have a bowel movement she was having pain, but it was diarrhea; patient states the pain is also radiating into her low back.       She requests these members of her care team be copied on today's visit information: PCp    PCP: Aura Rosario    Referring Provider:  No referring provider defined for this encounter.    /74 (BP Location: Left arm, Patient Position: Sitting, Cuff Size: Adult Regular)   Pulse 111   Wt 82.9 kg (182 lb 11.2 oz)   LMP 01/01/2020   SpO2 100%   BMI 33.42 kg/m      Do you need any medication refills at today's visit? No    Cathy Benitez LPN

## 2020-01-27 ENCOUNTER — MYC MEDICAL ADVICE (OUTPATIENT)
Dept: FAMILY MEDICINE | Facility: OTHER | Age: 30
End: 2020-01-27

## 2020-01-27 NOTE — TELEPHONE ENCOUNTER
Responded via [employer].     Will also reach out by phone to patient.     Isabella Vazquez, RN BSN

## 2020-01-27 NOTE — TELEPHONE ENCOUNTER
Spoke to patient via .    Last night she was coughing and coughing endlessly.   Chest feels like it is burning, worse last night.   Congested during the day.   Using halls during the day.  Noticed last night that her left ear has pus draining out of it.     UC for cough, because HA too.   Bronchitis diagnosed. Cough syrup with coedine.   Had not eaten.   Also makes her nose itch.   Patient dumped the bottle of coedine. Assumed it was an allergy so dumped the cough syrup out.     If she presses on chest area, it hurts.   Also has a sore throat.   Negative strep test.   Sore throat is not that bad, better today than yesterday.     Coughing hurts a lot in her chest.   Pain 4/10, but worse if she coughs a lot.  Coughing continues and gets up to like a 7/10.    Using inhalers.   States she is okay, just really sore from coughing all night long.   When she coughs, gets a horrible taste in her mouth.   Cough is productive and green.   No shortness of breath or difficulty breathing.   Has not taken any OTC cough medications at this point.     There is a thing in the ear lobe that is sticky and oozy.   Tried to empty it herself. Green/yellow drainage.   Using hydrogen peroxide on it and it foams a lot.   Ear lobe hurts from it.   Not aware of any injury to ear lobe.   States there is fluid inside the canal. States she can feel fluid in there.   Painful in her ear canal.   States it is yellow but not ear wax. White and sticky.     RN advised keeping appointment and to be seen tonight if symptoms worsen or has bleeding, difficulty breathing. Patient states understanding.     Next 5 appointments (look out 90 days)    Jan 28, 2020 10:00 AM CST  MyChart Short with Aura Tomlinson-LOVE Moss, KIS A[facility]  [facility] [address]   [facility] [id number] [phone]          Isabella Vazquez RN BSN

## 2020-01-28 ENCOUNTER — NURSE TRIAGE (OUTPATIENT)
Dept: NURSING | Facility: CLINIC | Age: 30
End: 2020-01-28

## 2020-01-28 ENCOUNTER — TRANSFERRED RECORDS (OUTPATIENT)
Dept: HEALTH INFORMATION MANAGEMENT | Facility: CLINIC | Age: 30
End: 2020-01-28

## 2020-01-29 ENCOUNTER — MYC MEDICAL ADVICE (OUTPATIENT)
Dept: FAMILY MEDICINE | Facility: OTHER | Age: 30
End: 2020-01-29

## 2020-01-29 NOTE — TELEPHONE ENCOUNTER
"Calling using [employer]. Cough for a few days. Tonight reports she is coughing up quarter size amounts of blood \"20 to 30 times\" tonight. Also reports SOB and chest discomfort. Advised 911. Pt declined despite discussing rationale - said will have sig other take her to hospital 1 mile away.    Reason for Disposition    [1] Chest pain AND [2] difficulty breathing    Additional Information    Negative: Severe difficulty breathing (e.g., struggling for each breath, speaks in single words)    Protocols used: COUGHING UP BLOOD-A-AH      "

## 2020-01-29 NOTE — TELEPHONE ENCOUNTER
"Bad headache, sore throat, pus in the back of throat. Coughing up blood. Chest is sore, can't Inhale. Went to the emergency room and they didn't do anything. Just noticed the pus on tonsils today. Coughed up blood \"a ton\". Has been having trouble breathing, states her oxygen was a little low, she thinks 80%. Neb helped. The ED told her it was a cold and she should rest. Pt will go to [place] ED for the pain and coughing up blood.     Claudine Jimenez, MSN, RN    "

## 2020-02-02 ENCOUNTER — NURSE TRIAGE (OUTPATIENT)
Dept: NURSING | Facility: CLINIC | Age: 30
End: 2020-02-02

## 2020-02-03 NOTE — PROGRESS NOTES
Subjective     Katy Islas is a 29 year old female who presents to clinic today for the following health issues:    History of Present Illness        She eats 0-1 servings of fruits and vegetables daily.She consumes 1 sweetened beverage(s) daily.She exercises with enough effort to increase her heart rate 9 or less minutes per day.  She exercises with enough effort to increase her heart rate 3 or less days per week.   She is taking medications regularly.     ED/UC Followup:    Facility:  [facility]   Date of visit: 1/29/20, 2/2/2020  Reason for visit: headache  Current Status: still on antibiotics    - headache is gone, still having cough using neb (every 4 hours) and delsym,   - better though and not coughing up blood anymore   - Dizzy and feels like shes going to pass out - vision goes in and out, happens after coughing a lot and very hard   - Feels like an itch in her chest when coughing   - Took a diflucan      Omnicef and Zithromax      3-4 days left of Omnicef       She says she has bruises again on her legs - blood work was high        RBCs low persistently with normal hematocrit and hemoglobin        D-dimer was elevated     Back has been good       Saw GI, back doctor (avila), and GYN, told possible endometriosis      Back doctor said her back was fine        Painful when has periods         - Upcoming appointment with colorectal sugergery          ED X-ray   XR Chest PA & LAT  IMPRESSION:   The cardiomediastinal silhouette is normal. The pulmonary vasculature is within normal limits. There are streaky bilateral pulmonary infiltrates suggesting pneumonia. No pleural effusions.  No pneumothorax. No mediastinal shift.  REPORT SIGNED BY DR. Veronica Munoz    MDM:   Katy Islas is a 29 y.o. female who comes in with complaints of fever and cough. Symptoms started yesterday. She has chest pain with coughing. She also says she coughed up some bloody sputum. On exam here, she does have a low grade temp  of 100.1F. O2 sats are good in the high 90s. She is a little tachycardic, but otherwise she has stable vital signs. On exam, she is tachycardic but really no other significant findings. The patient did have laboratory studies obtained. Her CBC is normal and so is her BNP. She has a slightly increased creatinine at 1.16. D-Dimer however is elevated at 643. With the hemoptysis, I did end up doing a CT which shows no pulmonary embolism but there is bilateral patchy infiltrate consistent with pneumonia. The patient is vitally stable and nontoxic appearing. Therefore, she will be treated in an outpatient setting. I did give her a liter of fluids as well as her first dose of Azithromycin and Cefdinir.     CT scan  FINDINGS: There is adequate opacification of the pulmonary arteries with IV contrast. There are no filling defects to suggest pulmonary artery embolus.   The heart size is normal. The thoracic aorta is normal in caliber. No pericardial effusion.   There are prominent paratracheal, AP window and hilar lymph nodes that may be reactive.   The liver is fatty infiltrated but not completely evaluated.    There is residual thymic soft tissue incidentally noted.  There are bilateral patchy infiltrates, most consolidated within the left lower lobe consistent with pneumonia. No pleural effusions.        Review of Systems   ROS COMP: Constitutional, HEENT, cardiovascular, pulmonary, gi, gyn, neurological, musculoskeletal, psychiatric, and gu systems are negative, except as otherwise noted.      Objective    /80   Pulse 100   Temp 97.5  F (36.4  C) (Temporal)   Resp 16   Wt 83.6 kg (184 lb 6.4 oz)   SpO2 95%   BMI 33.73 kg/m    Body mass index is 33.73 kg/m .  Physical Exam   GENERAL APPEARANCE: mildly ill appearing, alert and no distress  EYES: Eyes grossly normal to inspection, PERRLA, conjunctivae and sclerae without injection or discharge, EOM intact   HENT: Bilateral ear canals without erythema or cerumen,  bilateral TM's pearly grey with normal light reflex, no effusion, injection, or bulging, nasal turbinates without swelling, erythema, or discharge, mouth without ulcers or lesions, oropharynx clear and oral mucous membranes moist, no sinus tenderness   NECK: No adenopathy in cervical or supraclavicular regions  RESP: Lungs clear to auscultation - no rales, rhonchi or wheezes   CV: Regular rates and rhythm, normal S1 S2, no S3 or S4, no murmur, click or rub  MS: No musculoskeletal defects are noted and gait is age appropriate without ataxia   SKIN: No suspicious lesions or rashes, hydration status appears adeuqate with normal skin turgor   PSYCH: Alert and oriented x3; speech- coherent , normal rate and volume; able to articulate logical thoughts, able to abstract reason, no tangential thoughts, no hallucinations or delusions, mentation appears normal, Mood is euthymic. Affect is appropriate for this mood state and bright. Thought content is free of suicidal ideation, hallucinations, and delusions. Dress is adequate and upkept. Eye contact is good during conversation.       Diagnostic Test Results:  Labs reviewed in Epic  none         Assessment & Plan       ICD-10-CM    1. Pneumonia of both lower lobes due to infectious organism (H) J18.1 XR Chest 2 Views   2. Mild persistent asthma without complication J45.30    3. Cough R05 benzonatate (TESSALON) 200 MG capsule     XR Chest 2 Views   4. Elevated d-dimer R79.89 Blood Morphology Pathologist Review     CBC with platelets differential     Reticulocyte Count     D dimer, quantitative   5. Low hematocrit D64.9 Blood Morphology Pathologist Review     CBC with platelets differential     Reticulocyte Count   6. Dysmenorrhea N94.6    7. Anal fissure K60.2      1-3.    - Patient here to follow up after ED visit for bilateral lung base pneumonia   - Reviewed ED note including CXR and CT scan   - Patient completed Zithromax and has 3-4 days left of Omnicef  - Lungs CTA today    - Discussed likely will take awhile for cough to improve due to concurrent asthma   - Recommend the following       - Tessalon as needed for cough           Discussed use and side effects, has helped her in the past        - Finish antibiotic as prescribed        - Continue to neb every 4 hours until feeling better   - Recommend recheck in 2 weeks with CXR to assure clearing     4 & 5. Elevated D-dimer and low RBCs   - Patient had elevated D-dimer in ED, had CT for PE and was negative, this was reviewed with patient    -  Recommend recheck D-dimer in 2 weeks   - Also recommend we evaluate this persistent low RBCs, with occasionally low hematocrit but no anemia   - Recommend we check this along with a peripheral smear in 2 weeks   - Patient gets large areas of hematomas and ecchymosis, family history of bleeding and clotting disorders   - Discussed I had referred her to genetic counseling for this, gave her number to schedule   - Discussed pending lab results, may also warrant a referral to hematology, however at this time we will wait on this because the likely cause of this is her recurrent bleeding from anal fissures and likely charlie ramos tears, this is why we are waiting 2 weeks to recheck these     6. Dysmenorrhea   - Patient reports did see her back surgeon, told her back is ok and that pain could be from her possible endometriosis   - She has met with GYN and genetic counseling, awaiting results on this before proceeding with possible hysterectomy   - Encouraged her to continue to be off pain medications and to make her follow up appointments     7. Anal fissures   - Reports still occasional bleeding from these but pain is improved   - Has appointment with colorectal surgery to repair them on 2/17/20   - Encouraged her to keep this appointment     The patient indicates understanding of these issues and agrees with the plan.    Return in about 2 weeks (around 2/18/2020) for Recheck. with CXR and labs      Due to language barrier, an  was present during the history-taking and subsequent discussion (and for part of the physical exam) with this patient.    A total of 50 minutes was spent with the patient today, with greater than 50% of the visit involving counseling and coordination of care.      Aura Rosario PA-C  [facility]

## 2020-02-04 ENCOUNTER — OFFICE VISIT (OUTPATIENT)
Dept: FAMILY MEDICINE | Facility: OTHER | Age: 30
End: 2020-02-04
Payer: MEDICARE

## 2020-02-04 VITALS
WEIGHT: 184.4 LBS | SYSTOLIC BLOOD PRESSURE: 114 MMHG | BODY MASS INDEX: 33.73 KG/M2 | HEART RATE: 100 BPM | TEMPERATURE: 97.5 F | DIASTOLIC BLOOD PRESSURE: 80 MMHG | RESPIRATION RATE: 16 BRPM | OXYGEN SATURATION: 95 %

## 2020-02-04 DIAGNOSIS — R79.89 ELEVATED D-DIMER: ICD-10-CM

## 2020-02-04 DIAGNOSIS — J45.30 MILD PERSISTENT ASTHMA WITHOUT COMPLICATION: ICD-10-CM

## 2020-02-04 DIAGNOSIS — K60.2 ANAL FISSURE: ICD-10-CM

## 2020-02-04 DIAGNOSIS — J18.9 PNEUMONIA OF BOTH LOWER LOBES DUE TO INFECTIOUS ORGANISM: Primary | ICD-10-CM

## 2020-02-04 DIAGNOSIS — N94.6 DYSMENORRHEA: ICD-10-CM

## 2020-02-04 DIAGNOSIS — D64.9 LOW HEMATOCRIT: ICD-10-CM

## 2020-02-04 DIAGNOSIS — R05.9 COUGH: ICD-10-CM

## 2020-02-04 PROCEDURE — 99215 OFFICE O/P EST HI 40 MIN: CPT | Performed by: PHYSICIAN ASSISTANT

## 2020-02-04 RX ORDER — BENZONATATE 200 MG/1
200 CAPSULE ORAL 3 TIMES DAILY PRN
Qty: 60 CAPSULE | Refills: 1 | Status: SHIPPED | OUTPATIENT
Start: 2020-02-04 | End: 2020-03-10

## 2020-02-04 RX ORDER — PROCHLORPERAZINE MALEATE 10 MG
10 TABLET ORAL
COMMUNITY
Start: 2020-01-10 | End: 2020-03-20

## 2020-02-04 RX ORDER — CLINDAMYCIN HCL 150 MG
CAPSULE ORAL
COMMUNITY
End: 2020-03-23

## 2020-02-04 RX ORDER — OXYCODONE AND ACETAMINOPHEN 5; 325 MG/1; MG/1
1 TABLET ORAL
COMMUNITY
Start: 2020-01-28 | End: 2020-03-10

## 2020-02-04 ASSESSMENT — ANXIETY QUESTIONNAIRES
5. BEING SO RESTLESS THAT IT IS HARD TO SIT STILL: SEVERAL DAYS
4. TROUBLE RELAXING: NEARLY EVERY DAY
2. NOT BEING ABLE TO STOP OR CONTROL WORRYING: NEARLY EVERY DAY
3. WORRYING TOO MUCH ABOUT DIFFERENT THINGS: NEARLY EVERY DAY
GAD7 TOTAL SCORE: 16
7. FEELING AFRAID AS IF SOMETHING AWFUL MIGHT HAPPEN: NOT AT ALL
1. FEELING NERVOUS, ANXIOUS, OR ON EDGE: NEARLY EVERY DAY
6. BECOMING EASILY ANNOYED OR IRRITABLE: NEARLY EVERY DAY
GAD7 TOTAL SCORE: 16
GAD7 TOTAL SCORE: 16
7. FEELING AFRAID AS IF SOMETHING AWFUL MIGHT HAPPEN: NOT AT ALL

## 2020-02-04 ASSESSMENT — PAIN SCALES - GENERAL: PAINLEVEL: MILD PAIN (2)

## 2020-02-04 NOTE — PATIENT INSTRUCTIONS
- Tessalon as needed for cough     - Finish antibiotic     - Continue to neb every 4 hours     - Call for appointment with hematology genetic counseling   [facility]: [phone]    - Recheck in 2 weeks with Chest x-ray and labs

## 2020-02-05 ENCOUNTER — MYC MEDICAL ADVICE (OUTPATIENT)
Dept: FAMILY MEDICINE | Facility: OTHER | Age: 30
End: 2020-02-05

## 2020-02-05 ASSESSMENT — ANXIETY QUESTIONNAIRES: GAD7 TOTAL SCORE: 16

## 2020-02-05 ASSESSMENT — PATIENT HEALTH QUESTIONNAIRE - PHQ9: SUM OF ALL RESPONSES TO PHQ QUESTIONS 1-9: 6

## 2020-02-06 ENCOUNTER — TELEPHONE (OUTPATIENT)
Dept: ONCOLOGY | Facility: CLINIC | Age: 30
End: 2020-02-06

## 2020-02-06 NOTE — TELEPHONE ENCOUNTER
Pt called asking if we could fax her genetic testing results to the office of Dr Berumen. Pt does not know the fax #, Phone # is [phone].  In triage we are not allowed to release results that have not been reviewed with Pt without provider approval. Routing to ordering  and future visit provider.

## 2020-02-07 ENCOUNTER — MYC MEDICAL ADVICE (OUTPATIENT)
Dept: FAMILY MEDICINE | Facility: OTHER | Age: 30
End: 2020-02-07

## 2020-02-07 NOTE — TELEPHONE ENCOUNTER
Patient calling back again.  She is asking if the oxycodone can be filled today.  I informed her the due date to be filled is tomorrow's date 11-26-19.  She wanted to speak with someone else on the team, I transferred the call to Team BKaty.  Thank you   17

## 2020-02-07 NOTE — TELEPHONE ENCOUNTER
Fine for number to derm.   She is going to the cancer center for return visit for cancer gene results   She was referred to Hematology for genetic testing for bleeding and clotting disorders.   I am not aware of any genetic testing for crohn's. She would need to ask that of her GI specialist.   She has an appointment with colorectal surgery soon as well for her anal fissures.     Zach Rosario PA-C  [facility]

## 2020-02-11 ENCOUNTER — OFFICE VISIT (OUTPATIENT)
Dept: ONCOLOGY | Facility: CLINIC | Age: 30
End: 2020-02-11
Attending: GENETIC COUNSELOR, MS
Payer: MEDICARE

## 2020-02-11 DIAGNOSIS — Z80.42 FAMILY HISTORY OF PROSTATE CANCER: ICD-10-CM

## 2020-02-11 DIAGNOSIS — Z80.3 FAMILY HISTORY OF MALIGNANT NEOPLASM OF BREAST: ICD-10-CM

## 2020-02-11 DIAGNOSIS — Z80.41 FAMILY HISTORY OF MALIGNANT NEOPLASM OF OVARY: Primary | ICD-10-CM

## 2020-02-11 DIAGNOSIS — Z80.8 FAMILY HISTORY OF SKIN CANCER: ICD-10-CM

## 2020-02-11 PROCEDURE — 40000072 ZZH STATISTIC GENETIC COUNSELING, < 16 MIN: Mod: ZF | Performed by: GENETIC COUNSELOR, MS

## 2020-02-11 PROCEDURE — T1013 SIGN LANG/ORAL INTERPRETER: HCPCS | Mod: U3,ZF

## 2020-02-11 NOTE — PROGRESS NOTES
"2/11/2020    Referring Provider: Aura Rosario PA-C    Presenting Information:  Katy Islas returned to the Cancer Risk Management Program at the [facility] to discuss her genetic testing results. Her blood was drawn on 1/22/2020. A custom panel (a combination of the [employer] Common Hereditary Cancers Panel + [employer] Melanoma Panel) was ordered from 5 examples. This testing was done because of her family history of cancer.    Genetic Testing Result: NEGATIVE  Katy is negative for mutations in the 51 genes analyzed: APC, GARTH, AXIN2, BAP1, BARD1, BMPR1A, BRCA1, BRCA2, BRIP1, CDH1, CDK4, CDKN2A, CHEK2, CTNNA1, DICER1, EPCAM, GREM1, HOXB13, KIT, MEN1, MITF, MLH1, MSH2, MSH3, MSH6, MUTYH, NBN, NF1, NTHL1, PALB2, PDGFRA, PMS2, POLD1, POLE, POT1, PTEN, RAD50, RAD51C, RAD51D, RB1, SDHA, SDHB, SDHC, SDHD, SMAD4, SMARCA4, STK11, TP53, TSC1, TSC2, VHL.    A copy of the test report can be found in the Laboratory tab, dated 1/22/2020, and named \"[facility]\". The report is scanned in as a linked document.    Interpretation:  We discussed several different interpretations of this negative test result.    1. One explanation may be that there is a different gene or combination of genes and environment that are associated with the cancers in this family.  2. Another explanation may be that her relatives have a mutation in one of these genes and she did not inherit it.  3. There is also a small possibility that there is a mutation in one of these genes, and the testing laboratory could not find it with their current testing methods.       Screening:  Based on this negative test result, it is important for Katy and her relatives to refer back to the family history for appropriate cancer screening.      She should continue with her plans for hysterectomy and gynecologic exams as recommended by her gynecologist. Katy previously reported that her mother had an ovarian tumor removed in 2008 and she " was not sure if this was a cancer as her mother was very private with this information. We discussed that it would be important to confirm whether or not her mother had ovarian cancer if possible. She reports today that this was not a cancer.    Based on the personal and family history information she provided, Katy does not meet current National Comprehensive Cancer Network (NCCN) guidelines for high risk breast screening based on the [employer] Risk Evaluator v8 model. As such, Katy should follow routine breast imaging. In addition, she should be receiving clinical breast exams by her physician.     She should continue with her colonoscopies as recommended by her gastroenterologist. She should also continue with her skin exams. Other population cancer screening options, such as those recommended by the American Cancer Society and the National Comprehensive Cancer Network (NCCN), are also appropriate for Katy and her family. These screening recommendations may change if there are changes to Katy's personal and/or family history of cancer. Final screening recommendations should be made by each individual's managing physician.      Inheritance:  We reviewed the autosomal dominant inheritance of mutations in these genes. Mutations in these genes do not skip generations.      Additional Testing Considerations:  Although Katy's genetic testing result was negative, other relatives may still carry a gene mutation associated with an increased risk for cancer. Genetic counseling is recommended for her mother and maternal relatives to discuss genetic testing options. Her paternal cousin who had a daughter with ovarian cancer should also consider meeting with a genetic counselor to discuss testing options. If any of these relatives do pursue genetic testing, Katy is encouraged to contact me so that we may review the impact of their test results on her.    Summary:  We do not have an explanation for Katy's family history of  cancer. While no genetic changes were identified, Katy may still be at risk for certain cancers due to family history, environmental factors, or other genetic causes not identified by this test. Because of that, it is important that she continue with cancer screening based on her personal and family history as discussed above.    Genetic testing is rapidly advancing, and new cancer susceptibility genes will most likely be identified in the future. Therefore, I encouraged Katy to contact me annually or if there are changes in her personal or family history. This may change how we assess her cancer risk, screening, and the testing we would offer.    Plan:  1. I provided Katy with a copy of her test results today and a handout summarizing negative genetic test results (see after visit summary).  2. She plans to follow-up with her physicians.  3. She should contact me annually, or sooner if her family history changes.    If Katy has any further questions, I encouraged her to contact me at [phone].    Time spent face to face: 10 minutes    Yessica Vernon MS, [facility]  Licensed Genetic Counselor  Office: [phone]

## 2020-02-11 NOTE — LETTER
"2/11/2020     RE: Katy Islas  [address]  [place] [address]     Dear Colleague,    Thank you for referring your patient, Katy Islas, to the [facility]. Please see a copy of my visit note below.    2/11/2020    Referring Provider: Aura Rosario PA-C    Presenting Information:  Katy Islas returned to the Cancer Risk Management Program at the [facility] to discuss her genetic testing results. Her blood was drawn on 1/22/2020. A custom panel (a combination of the Invitae Common Hereditary Cancers Panel + Invitae Melanoma Panel) was ordered from [employer]. This testing was done because of her family history of cancer.    Genetic Testing Result: NEGATIVE  Katy is negative for mutations in the 51 genes analyzed: APC, GARTH, AXIN2, BAP1, BARD1, BMPR1A, BRCA1, BRCA2, BRIP1, CDH1, CDK4, CDKN2A, CHEK2, CTNNA1, DICER1, EPCAM, GREM1, HOXB13, KIT, MEN1, MITF, MLH1, MSH2, MSH3, MSH6, MUTYH, NBN, NF1, NTHL1, PALB2, PDGFRA, PMS2, POLD1, POLE, POT1, PTEN, RAD50, RAD51C, RAD51D, RB1, SDHA, SDHB, SDHC, SDHD, SMAD4, SMARCA4, STK11, TP53, TSC1, TSC2, VHL.    A copy of the test report can be found in the Laboratory tab, dated 1/22/2020, and named \"SEND OUTS [facility] TEST\". The report is scanned in as a linked document.    Interpretation:  We discussed several different interpretations of this negative test result.    1. One explanation may be that there is a different gene or combination of genes and environment that are associated with the cancers in this family.  2. Another explanation may be that her relatives have a mutation in one of these genes and she did not inherit it.  3. There is also a small possibility that there is a mutation in one of these genes, and the testing laboratory could not find it with their current testing methods.       Screening:  Based on this negative test result, it is important for Katy and her relatives to refer back to the family " history for appropriate cancer screening.      She should continue with her plans for hysterectomy and gynecologic exams as recommended by her gynecologist. Katy previously reported that her mother had an ovarian tumor removed in 2008 and she was not sure if this was a cancer as her mother was very private with this information. We discussed that it would be important to confirm whether or not her mother had ovarian cancer if possible. She reports today that this was not a cancer.    Based on the personal and family history information she provided, Katy does not meet current National Comprehensive Cancer Network (NCCN) guidelines for high risk breast screening based on the DEMARCUS Risk Evaluator v8 model. As such, Katy should  follow routine breast imaging. In addition, she should be receiving clinical breast exams by her physician.     She should continue with her colonoscopies as recommended by her gastroenterologist. She should also continue with her skin exams. Other population cancer screening options, such as those recommended by the American Cancer Society and the National Comprehensive Cancer Network (NCCN), are also appropriate for Katy and her family. These screening recommendations may change if there are changes to Katy's personal and/or family history of cancer. Final screening recommendations should be made by each individual's managing physician.      Inheritance:  We reviewed the autosomal dominant inheritance of mutations in these genes. Mutations in these genes do not skip generations.      Additional Testing Considerations:  Although Katy's genetic testing result was negative, other relatives may still carry a gene mutation associated with an increased risk for cancer. Genetic counseling is recommended for her mother and maternal relatives to discuss genetic testing options. Her paternal cousin who had a daughter with ovarian cancer should also consider meeting with a genetic counselor to  discuss testing options. If any of these relatives do pursue genetic testing, Katy is encouraged to contact me so that we may review the impact of their test results on her.    Summary:  We do not have an explanation for Katy's family history of cancer. While no genetic changes were identified, Katy may still be at risk for certain cancers due to family history, environmental factors, or other genetic causes not identified by this test. Because of that, it is important that she continue with cancer screening based on her personal and family history as discussed above.    Genetic testing is rapidly advancing, and new cancer susceptibility genes will most likely be identified in the future. Therefore, I encouraged Katy to contact me annually or if there are changes in her personal or family history. This may change how we assess her cancer risk, screening, and the testing we would offer.    Plan:  1. I provided Katy with a copy of her test results today and a handout summarizing negative genetic test results (see after visit summary).  2. She plans to follow-up with her physicians.  3. She should contact me annually, or sooner if her family history changes.    If Katy has any further questions, I encouraged her to contact me at [phone].    Time spent face to face: 10 minutes    Yessica Vernon MS, [facility]  Licensed Genetic Counselor  Office: [phone]

## 2020-02-11 NOTE — LETTER
Cancer Risk Management  Program Locations    [facility]  [facility] Cancer [facility]  Mailing Address  Cancer Risk Management Program  [facility]  [address] [place] [address]    New patient appointments  [phone]  February 12, 2020    Katy Islas  [address]  [place] [address]      Dear Katy,    It was a pleasure meeting with you at the Cancer Risk Management Program at the [facility] on 2/11/2020. Here is a copy of the progress note from your recent genetic counseling visit to the Cancer Risk Management Program. If you have any additional questions, please feel free to call.    Referring Provider: Aura Rosario PA-C    Presenting Information:  Katy Islas returned to the Cancer Risk Management Program at the [facility] to discuss her genetic testing results. Her blood was drawn on 1/22/2020. A custom panel (a combination of the Invitae Common Hereditary Cancers Panel + Invitae Melanoma Panel) was ordered from [employer]. This testing was done because of her family history of cancer.    Genetic Testing Result: NEGATIVE  Katy is negative for mutations in the 51 genes analyzed: APC, GARTH, AXIN2, BAP1, BARD1, BMPR1A, BRCA1, BRCA2, BRIP1, CDH1, CDK4, CDKN2A, CHEK2, CTNNA1, DICER1, EPCAM, GREM1, HOXB13, KIT, MEN1, MITF, MLH1, MSH2, MSH3, MSH6, MUTYH, NBN, NF1, NTHL1, PALB2, PDGFRA, PMS2, POLD1, POLE, POT1, PTEN, RAD50, RAD51C, RAD51D, RB1, SDHA, SDHB, SDHC, SDHD, SMAD4, SMARCA4, STK11, TP53, TSC1, TSC2, VHL.    Interpretation:  We discussed several different interpretations of this negative test result.    1. One explanation may be that there is a different gene or combination of genes and environment that are associated with the cancers in this family.  2. Another  explanation may be that her relatives have a mutation in one of these genes and she did not inherit it.  3. There is also a small possibility that there is a mutation in one of these genes, and the testing laboratory could not find it with their current testing methods.       Screening:  Based on this negative test result, it is important for Katy and her relatives to refer back to the family history for appropriate cancer screening.      She should continue with her plans for hysterectomy and gynecologic exams as recommended by her gynecologist. Katy previously reported that her mother had an ovarian tumor removed in 2008 and she was not sure if this was a cancer as her mother was very private with this information. We discussed that it would be important to confirm whether or not her mother had ovarian cancer if possible. She reports today that this was not a cancer.    Based on the personal and family history information she provided, Katy does not meet current National Comprehensive Cancer Network (NCCN) guidelines for high risk breast screening based on the DEMARCUS Risk Evaluator v8 model. As such, Katy should  follow routine breast imaging. In addition, she should be receiving clinical breast exams by her physician.     She should continue with her colonoscopies as recommended by her gastroenterologist. She should also continue with her skin exams. Other population cancer screening options, such as those recommended by the American Cancer Society and the National Comprehensive Cancer Network (NCCN), are also appropriate for Katy and her family. These screening recommendations may change if there are changes to Katy's personal and/or family history of cancer. Final screening recommendations should be made by each individual's managing physician.      Inheritance:  We reviewed the autosomal dominant inheritance of mutations in these genes. Mutations in these genes do not skip generations.      Additional  Testing Considerations:  Although Katy's genetic testing result was negative, other relatives may still carry a gene mutation associated with an increased risk for cancer. Genetic counseling is recommended for her mother and maternal relatives to discuss genetic testing options. Her paternal cousin who had a daughter with ovarian cancer should also consider meeting with a genetic counselor to discuss testing options. If any of these relatives do pursue genetic testing, Katy is encouraged to contact me so that we may review the impact of their test results on her.    Summary:  We do not have an explanation for Katy's family history of cancer. While no genetic changes were identified, Katy may still be at risk for certain cancers due to family history, environmental factors, or other genetic causes not identified by this test. Because of that, it is important that she continue with cancer screening based on her personal and family history as discussed above.    Genetic testing is rapidly advancing, and new cancer susceptibility genes will most likely be identified in the future. Therefore, I encouraged Katy to contact me annually or if there are changes in her personal or family history. This may change how we assess her cancer risk, screening, and the testing we would offer.    Plan:  1. I provided Katy with a copy of her test results today and a handout summarizing negative genetic test results (see after visit summary).  2. She plans to follow-up with her physicians.  3. She should contact me annually, or sooner if her family history changes.    If Katy has any further questions, I encouraged her to contact me at [phone].    Yessica Vernon MS, [facility]  Licensed Genetic Counselor  Office: [phone]

## 2020-02-11 NOTE — PATIENT INSTRUCTIONS
Negative Genetic Test Result    Genetic Testing  You had a blood test that looked at the genetic information in one or more genes associated with increased cancer risk.  The testing looked for any harmful changes that would stop this particular gene from working like it should. If an individual does not have any harmful changes or variants of unknown significance found from their blood test, their genetic test result is reported as negative.       Results  The genetic test did not identify any pathogenic (harmful) changes in the genes that were tested. There are several possible explanations for a negative test result. Without knowing the gene mutation in your family, the cause of the cancer in you or your relatives is still unknown. Your genetic counselor can help interpret the result for you and your relatives. In this case, there are several reasons that may explain the negative test result:    There may be a gene mutation in the family that you did not inherit.     You may have a gene mutation in a different gene that was not included in the test, or has not yet been discovered.     The cancers in you or your family may be due to a combination of genetic factors and environment (multifactorial/familial).    The cancers in you or your family may be sporadic/random cancers.    There is very small chance that a mutation was not found by current testing methods.  As testing technology evolves over time, it may still be possible to identify a mutation in a gene that was not found on this test.    It is important to note which genes were included in your test. A list of these genes can be found on your test result.    Screening Recommendations  Due to this negative test result, cancer screening recommendations should be based on your personal and family history. This may include increased cancer screening for you and/or your family members. Your genetic counselor and health care provider can help make  appropriate recommendations.      Please call us if you have any questions or concerns.   Cancer Risk Management Program [phone] ([phone])  ? Brandyn Smith, MS, [facility] [phone]  ? Louise Byrd, MS, [facility]  [phone]  ? Sally Anderson, MS, [facility]  [phone]  ? Angela Park, MS, [facility]  [phone]  ? Marianela Ribeiro, MS, [facility] [phone]  ? Glendy Haro, MS, [facility] [phone]  ? Yessica Vernon, MS, [facility]  [phone]

## 2020-02-12 ENCOUNTER — TELEPHONE (OUTPATIENT)
Facility: CLINIC | Age: 30
End: 2020-02-12

## 2020-02-12 ENCOUNTER — HOSPITAL ENCOUNTER (OUTPATIENT)
Facility: CLINIC | Age: 30
End: 2020-02-12
Attending: OBSTETRICS & GYNECOLOGY | Admitting: OBSTETRICS & GYNECOLOGY
Payer: MEDICARE

## 2020-02-12 ENCOUNTER — PREP FOR PROCEDURE (OUTPATIENT)
Dept: OBGYN | Facility: CLINIC | Age: 30
End: 2020-02-12

## 2020-02-12 DIAGNOSIS — N93.9 ABNORMAL UTERINE BLEEDING: Primary | ICD-10-CM

## 2020-02-12 DIAGNOSIS — N93.9 ABNORMAL UTERINE BLEEDING: ICD-10-CM

## 2020-02-12 NOTE — TELEPHONE ENCOUNTER
Type of surgery: total laparoscopic hysterectomy with bilateral salpingectomy, cystoscopy  Location of surgery: [facility]   Date of surgery: 3/25/20  Surgeon: Dr. Haro  Pre-Op Appt Date: 3/16/20  Post-Op Appt Date: 3/31, 5/5   Packet sent out: Surgery packet mailed to patient's home address.   Pre-cert/Authorization completed: NA  Date: 2/12/2020    Kat Hidalgo  Surgery Scheduler

## 2020-02-13 LAB — LAB SCANNED RESULT: NORMAL

## 2020-02-13 NOTE — PROGRESS NOTES
Subjective     Katy Islas is a 29 year old female who presents to clinic today for the following health issues:        History of Present Illness        She eats 0-1 servings of fruits and vegetables daily.She consumes 0 sweetened beverage(s) daily.She exercises with enough effort to increase her heart rate 9 or less minutes per day.  She exercises with enough effort to increase her heart rate 3 or less days per week.   She is taking medications regularly.     Patient here for follow on Pneumonia, states that she is very tired. Patient states that she hurt her knee and the pain is worsening.     - Hysterectomy scheduled      Hope helps with back (3 doctors say it is fine)    - Didn't sleep last 2 nights        - Hurt knee     - Blood pressure     - Drainage in ear     - Forgot appointment with colorectal surgery      Fissures are doing ok, not bleeding or hurting for about 1 month           Plan from last visit 2/4/20  - Patient here to follow up after ED visit for bilateral lung base pneumonia   - Reviewed ED note including CXR and CT scan   - Patient completed Zithromax and has 3-4 days left of Omnicef  - Lungs CTA today   - Discussed likely will take awhile for cough to improve due to concurrent asthma   - Recommend the following       - Tessalon as needed for cough           Discussed use and side effects, has helped her in the past        - Finish antibiotic as prescribed        - Continue to neb every 4 hours until feeling better   - Recommend recheck in 2 weeks with CXR to assure clearing        - Patient had elevated D-dimer in ED, had CT for PE and was negative, this was reviewed with patient    -  Recommend recheck D-dimer in 2 weeks   - Also recommend we evaluate this persistent low RBCs, with occasionally low hematocrit but no anemia   - Recommend we check this along with a peripheral smear in 2 weeks   - Patient gets large areas of hematomas and ecchymosis, family history of bleeding and clotting  disorders   - Discussed I had referred her to genetic counseling for this, gave her number to schedule   - Discussed pending lab results, may also warrant a referral to hematology, however at this time we will wait on this because the likely cause of this is her 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      Objective    /80 (BP Location: Left arm, Patient Position: Chair, Cuff Size: Adult Regular)   Pulse 114   Temp 98.1  F (36.7  C) (Temporal)   Resp 20   Wt 85.3 kg (188 lb)   SpO2 97%   Breastfeeding No   BMI 34.39 kg/m    Body mass index is 34.39 kg/m .  Physical Exam   GENERAL APPEARANCE: healthy, alert and no distress  EYES: Eyes grossly normal to inspection, PERRLA, conjunctivae and sclerae without injection or discharge, EOM intact   HEENT: Right ear with hearing aid in place       Left ear - canal and TM normal, small draining cystic structure located in the triangular fossa, tender and warm to touch   RESP: Lungs clear to auscultation - no rales, rhonchi or wheezes    CV: Regular rates and rhythm, normal S1 S2, no S3 or S4, no murmur, click or rub, no peripheral edema and peripheral pulses strong and symmetric bilaterally   MS: No musculoskeletal defects are noted and gait is age appropriate without ataxia   SKIN: No suspicious lesions or rashes, hydration status appears adeuqate with normal skin turgor   PSYCH: Alert and oriented x3; speech- coherent , normal rate and volume; able to articulate logical thoughts, able to abstract reason, no tangential thoughts, no hallucinations or delusions, mentation appears normal, Mood is euthymic. Affect is appropriate for this mood state and bright. Thought content is free of suicidal ideation, hallucinations, and delusions. Dress  is adequate and upkept. Eye contact is good during conversation.       Diagnostic Test Results:  Labs reviewed in [facility]    CXR: Normal- no infiltrates, effusions, pneumothoraces, cardiomegaly or masses  awaiting formal interpretation from Radiologist at this time    Procedure Note:  Pause for the cause has been completed prior to the prceedure.   1. Patient was identified by both name and date of birth   2. The correct site was identified   3. Site was marked by provider    4. Verbal authorization  to proceed was obtained   5. Verifed necessary supplies, equipment, and diagnostics are available    6. Time out was performed immediately prior to procedure    Objective: The lesion(s) is/are of the above mentioned size and location and is/are oozing. The area was prepped using alcohol swabs. Using the usual technique, incision and drainage of abscess was performed using a 26 gauge needle. Small amount of bloody purulent drainage was expressed. Hemostasis was obtained using pressure. The procedure was well tolerated and without complications.          Assessment & Plan       ICD-10-CM    1. Pneumonia of both lungs due to infectious organism, unspecified part of lung J18.9    2. Mild intermittent asthma without complication J45.20    3. Elevated d-dimer R79.89 D dimer, quantitative     Reticulocyte Count     CBC with platelets differential     Blood Morphology Pathologist Review   4. Low hematocrit D64.9 Reticulocyte Count     CBC with platelets differential     Blood Morphology Pathologist Review   5. Abscess L02.91 DRAIN SKIN ABSCESS SIMPLE/SINGLE   6. Cellulitis of other sites  L03.818 DRAIN SKIN ABSCESS SIMPLE/SINGLE     - Patient here to follow up on bilateral pneumonia with asthma exacerbation      Was seen in ED and treated with Zpack and Omnicef      Feeling much better       Lungs cleared on exam and x-ray shows resolution     - While in ED was found to have elevated D-Dimer as well       had CT for PE and was  negative, this was reviewed with patient      D-dimer at that time was 643       Will await labs today       - We have started evaluation for her persistently low hematocrit and severe bruising, along with family history of bleeding and clotting disorders      Reports having upcoming appointment with hematology       Today we will get retic and peripheral smear for further evaluation, await results     - Small tiny cyst abscess and cellulitis in left ear      Drained as above      Discussed after care   - Warm wash cloth      20 min intervals couple times a day to encourage further drainage   - Three times daily antibacterial ointment   - Discussed warning signs that would warrant return to clinic/ED     Due to language barrier, an  was present during the history-taking and subsequent discussion (and for part of the physical exam) with this patient.      The patient indicates understanding of these issues and agrees with the plan.    Follow up: with specialist     Aura Rosario PA-C  [facility]

## 2020-02-17 ENCOUNTER — PRE VISIT (OUTPATIENT)
Dept: SURGERY | Facility: CLINIC | Age: 30
End: 2020-02-17

## 2020-02-19 ENCOUNTER — ANCILLARY PROCEDURE (OUTPATIENT)
Dept: GENERAL RADIOLOGY | Facility: OTHER | Age: 30
End: 2020-02-19
Attending: PHYSICIAN ASSISTANT
Payer: MEDICARE

## 2020-02-19 ENCOUNTER — OFFICE VISIT (OUTPATIENT)
Dept: FAMILY MEDICINE | Facility: OTHER | Age: 30
End: 2020-02-19
Payer: MEDICARE

## 2020-02-19 VITALS
HEART RATE: 114 BPM | BODY MASS INDEX: 34.39 KG/M2 | DIASTOLIC BLOOD PRESSURE: 80 MMHG | SYSTOLIC BLOOD PRESSURE: 120 MMHG | WEIGHT: 188 LBS | RESPIRATION RATE: 20 BRPM | OXYGEN SATURATION: 97 % | TEMPERATURE: 98.1 F

## 2020-02-19 DIAGNOSIS — J45.20 MILD INTERMITTENT ASTHMA WITHOUT COMPLICATION: ICD-10-CM

## 2020-02-19 DIAGNOSIS — D64.9 LOW HEMATOCRIT: ICD-10-CM

## 2020-02-19 DIAGNOSIS — L03.818 CELLULITIS OF OTHER SITES: ICD-10-CM

## 2020-02-19 DIAGNOSIS — L02.91 ABSCESS: ICD-10-CM

## 2020-02-19 DIAGNOSIS — J18.9 PNEUMONIA OF BOTH LOWER LOBES DUE TO INFECTIOUS ORGANISM: ICD-10-CM

## 2020-02-19 DIAGNOSIS — R79.89 ELEVATED D-DIMER: ICD-10-CM

## 2020-02-19 DIAGNOSIS — R05.9 COUGH: ICD-10-CM

## 2020-02-19 DIAGNOSIS — J18.9 PNEUMONIA OF BOTH LUNGS DUE TO INFECTIOUS ORGANISM, UNSPECIFIED PART OF LUNG: Primary | ICD-10-CM

## 2020-02-19 LAB
BASOPHILS # BLD AUTO: 0 10E9/L (ref 0–0.2)
BASOPHILS NFR BLD AUTO: 0.7 %
D DIMER PPP FEU-MCNC: 0.7 UG/ML FEU (ref 0–0.5)
DIFFERENTIAL METHOD BLD: NORMAL
EOSINOPHIL # BLD AUTO: 0.1 10E9/L (ref 0–0.7)
EOSINOPHIL NFR BLD AUTO: 1.7 %
ERYTHROCYTE [DISTWIDTH] IN BLOOD BY AUTOMATED COUNT: 12.1 % (ref 10–15)
HCT VFR BLD AUTO: 39.3 % (ref 35–47)
HGB BLD-MCNC: 13 G/DL (ref 11.7–15.7)
LYMPHOCYTES # BLD AUTO: 1.3 10E9/L (ref 0.8–5.3)
LYMPHOCYTES NFR BLD AUTO: 32 %
MCH RBC QN AUTO: 32.9 PG (ref 26.5–33)
MCHC RBC AUTO-ENTMCNC: 33.1 G/DL (ref 31.5–36.5)
MCV RBC AUTO: 100 FL (ref 78–100)
MONOCYTES # BLD AUTO: 0.5 10E9/L (ref 0–1.3)
MONOCYTES NFR BLD AUTO: 11.5 %
NEUTROPHILS # BLD AUTO: 2.2 10E9/L (ref 1.6–8.3)
NEUTROPHILS NFR BLD AUTO: 54.1 %
PLATELET # BLD AUTO: 262 10E9/L (ref 150–450)
RBC # BLD AUTO: 3.95 10E12/L (ref 3.8–5.2)
RETICS # AUTO: 106.4 10E9/L (ref 25–95)
RETICS/RBC NFR AUTO: 2.7 % (ref 0.5–2)
WBC # BLD AUTO: 4.1 10E9/L (ref 4–11)

## 2020-02-19 PROCEDURE — 71046 X-RAY EXAM CHEST 2 VIEWS: CPT

## 2020-02-19 PROCEDURE — 99214 OFFICE O/P EST MOD 30 MIN: CPT | Mod: 25 | Performed by: PHYSICIAN ASSISTANT

## 2020-02-19 PROCEDURE — 85379 FIBRIN DEGRADATION QUANT: CPT | Performed by: PHYSICIAN ASSISTANT

## 2020-02-19 PROCEDURE — 36415 COLL VENOUS BLD VENIPUNCTURE: CPT | Performed by: PHYSICIAN ASSISTANT

## 2020-02-19 PROCEDURE — 85025 COMPLETE CBC W/AUTO DIFF WBC: CPT | Performed by: PHYSICIAN ASSISTANT

## 2020-02-19 PROCEDURE — 85045 AUTOMATED RETICULOCYTE COUNT: CPT | Performed by: PHYSICIAN ASSISTANT

## 2020-02-19 PROCEDURE — 85060 BLOOD SMEAR INTERPRETATION: CPT | Performed by: PHYSICIAN ASSISTANT

## 2020-02-19 PROCEDURE — 10160 PNXR ASPIR ABSC HMTMA BULLA: CPT | Performed by: PHYSICIAN ASSISTANT

## 2020-02-19 PROCEDURE — 40000847 ZZHCL STATISTIC MORPHOLOGY W/INTERP HISTOLOGY TC 85060: Performed by: PHYSICIAN ASSISTANT

## 2020-02-19 ASSESSMENT — ANXIETY QUESTIONNAIRES
GAD7 TOTAL SCORE: 2
5. BEING SO RESTLESS THAT IT IS HARD TO SIT STILL: NOT AT ALL
GAD7 TOTAL SCORE: 2
GAD7 TOTAL SCORE: 2
6. BECOMING EASILY ANNOYED OR IRRITABLE: SEVERAL DAYS
3. WORRYING TOO MUCH ABOUT DIFFERENT THINGS: NOT AT ALL
7. FEELING AFRAID AS IF SOMETHING AWFUL MIGHT HAPPEN: NOT AT ALL
4. TROUBLE RELAXING: NOT AT ALL
2. NOT BEING ABLE TO STOP OR CONTROL WORRYING: NOT AT ALL
1. FEELING NERVOUS, ANXIOUS, OR ON EDGE: SEVERAL DAYS
7. FEELING AFRAID AS IF SOMETHING AWFUL MIGHT HAPPEN: NOT AT ALL

## 2020-02-19 NOTE — PATIENT INSTRUCTIONS
- Warm wash cloth      20 min intervals couple times a day   - Three times daily antibacterial ointment

## 2020-02-20 LAB — COPATH REPORT: NORMAL

## 2020-02-20 ASSESSMENT — ANXIETY QUESTIONNAIRES: GAD7 TOTAL SCORE: 2

## 2020-02-20 ASSESSMENT — PATIENT HEALTH QUESTIONNAIRE - PHQ9: SUM OF ALL RESPONSES TO PHQ QUESTIONS 1-9: 4

## 2020-02-24 ENCOUNTER — OFFICE VISIT (OUTPATIENT)
Dept: ORTHOPEDICS | Facility: CLINIC | Age: 30
End: 2020-02-24
Payer: MEDICARE

## 2020-02-24 ENCOUNTER — ANCILLARY PROCEDURE (OUTPATIENT)
Dept: GENERAL RADIOLOGY | Facility: CLINIC | Age: 30
End: 2020-02-24
Attending: ORTHOPAEDIC SURGERY
Payer: MEDICARE

## 2020-02-24 ENCOUNTER — TELEPHONE (OUTPATIENT)
Dept: ORTHOPEDICS | Facility: OTHER | Age: 30
End: 2020-02-24

## 2020-02-24 VITALS
DIASTOLIC BLOOD PRESSURE: 72 MMHG | SYSTOLIC BLOOD PRESSURE: 110 MMHG | WEIGHT: 188 LBS | HEIGHT: 62 IN | BODY MASS INDEX: 34.6 KG/M2

## 2020-02-24 DIAGNOSIS — M22.2X1 PATELLOFEMORAL PAIN SYNDROME OF BOTH KNEES: ICD-10-CM

## 2020-02-24 DIAGNOSIS — M17.0 PRIMARY OSTEOARTHRITIS OF BOTH KNEES: ICD-10-CM

## 2020-02-24 DIAGNOSIS — M22.8X1 PATELLAR MALTRACKING, RIGHT: Primary | ICD-10-CM

## 2020-02-24 DIAGNOSIS — M25.561 PAIN IN LATERAL PORTION OF RIGHT KNEE: ICD-10-CM

## 2020-02-24 DIAGNOSIS — M22.8X1 PATELLAR MALTRACKING, RIGHT: ICD-10-CM

## 2020-02-24 DIAGNOSIS — M22.2X2 PATELLOFEMORAL PAIN SYNDROME OF BOTH KNEES: ICD-10-CM

## 2020-02-24 DIAGNOSIS — M25.562 LEFT KNEE PAIN, UNSPECIFIED CHRONICITY: ICD-10-CM

## 2020-02-24 PROCEDURE — 73564 X-RAY EXAM KNEE 4 OR MORE: CPT | Mod: TC

## 2020-02-24 PROCEDURE — 99214 OFFICE O/P EST MOD 30 MIN: CPT | Mod: 25 | Performed by: ORTHOPAEDIC SURGERY

## 2020-02-24 PROCEDURE — 20610 DRAIN/INJ JOINT/BURSA W/O US: CPT | Mod: LT | Performed by: ORTHOPAEDIC SURGERY

## 2020-02-24 RX ORDER — TRIAMCINOLONE ACETONIDE 40 MG/ML
40 INJECTION, SUSPENSION INTRA-ARTICULAR; INTRAMUSCULAR ONCE
Status: COMPLETED | OUTPATIENT
Start: 2020-02-24 | End: 2020-02-24

## 2020-02-24 RX ADMIN — TRIAMCINOLONE ACETONIDE 40 MG: 40 INJECTION, SUSPENSION INTRA-ARTICULAR; INTRAMUSCULAR at 15:30

## 2020-02-24 ASSESSMENT — PAIN SCALES - GENERAL: PAINLEVEL: MODERATE PAIN (4)

## 2020-02-24 ASSESSMENT — MIFFLIN-ST. JEOR: SCORE: 1518.07

## 2020-02-24 NOTE — PROGRESS NOTES
Office Visit-Follow up    Chief Complaint: Katy Islas is a 29 year old female who is being seen for   Chief Complaint   Patient presents with     RECHECK     Right anterior knee pain, right patellar maltracking      RECHECK     left knee acute pain: medial mensicus tear versus anterior knee pain       History of Present Illness:   Today's visit:   Patient states pain in bilateral knees.  She admits to off/on pain of right knee for many years and has had previous injections which help and physical therapy which she reports has made pain worse.  The past 3 weeks have been especially painful.  Patient states activities that are difficult for her include:  Going up and down stairs, kneeling or other bent knee activity.  She reports around 3 weeks ago she did fall on ice but did not notice injury at that time.  About 2 weeks ago she states she had difficulty walking especially with right knee.   This has improved since then.  She states she is getting back pain which she attributes to her knee pain. Pain in right knee is lateral knee pain and deep. The left knee has prepatellar and medial joint line pain.    9/5/18 visit:  Returns to clinic today for her right knee. Communication done with  for sign language.  Previous intra-articular injection provided good relief up until recently.  The injection prior to that (IT band injection) did not provide much relief.  States same symptoms as before, anterior knee pain, worst with stairs, hills, and squatting.  Also being seen by spine for SI joint and did go through some combined SI joint and knee PT, 6 sessions, and this aggravated the knee.  No new injuries.  Has not had dedicated anterior knee PT before, has not tried bracing.  Notes she will in the near future undergoing bilateral SI joint fusions.    5/21/18 visit  Communication through an  sign language.  After her last visit she received a right knee distal IT band injection with that provider  "1 month of very little relief.  She has been taking Flexeril.  Pain is worse when she goes from a kneeling to standing position.  No new traumas or injuries.  Right knee much worse than left.  Left is recently started bothering her  December 21, 2017 visit:  Katy Islas is a 27 year old female who is seen in consultation at the request of Amber Moss for evaluation of right knee pain.  Mechanism of Injury: No trauma or inciting event.  Location: right knee lateral, anterior  Duration of Pain: On and off for a few years  Rating of Pain:  moderate.    Pain Quality: aching and sharp  Pain is better with: Rest  Pain is worse with: Walking  Treatment so far consists of: Narcotics, previous injections into her knees years ago.   Associated Features: Swelling  Prior history of related problems:    utilized throughout her visit  She had back surgery October 2016.    Social History     Occupational History     Not on file   Tobacco Use     Smoking status: Current Every Day Smoker     Packs/day: 0.25     Years: 5.00     Pack years: 1.25     Types: Cigarettes     Smokeless tobacco: Never Used     Tobacco comment: due to illness    Substance and Sexual Activity     Alcohol use: Not Currently     Comment: Not since last July 2018     Drug use: Yes     Types: Marijuana     Comment: Medical Marijuana currently-- More CBD     Sexual activity: Not Currently     Partners: Male     Birth control/protection: Condom, Injection     REVIEW OF SYSTEMS  General: negative for, night sweats, dizziness, fatigue  Resp: No shortness of breath and no cough  CV: negative for chest pain, syncope or near-syncope  GI: negative for nausea, vomiting and diarrhea  : negative for dysuria and hematuria  Musculoskeletal: as above  Neurologic: negative for syncope   Hematologic: negative for bleeding disorder    Physical Exam:  Vitals: /72   Ht 1.562 m (5' 1.5\")   Wt 85.3 kg (188 lb)   BMI 34.95 kg/m    BMI= " Body mass index is 34.95 kg/m .  Constitutional: healthy, alert and no acute distress   Psychiatric: mentation appears normal and affect normal/bright  NEURO: no focal deficits  RESP: Normal with easy respirations and no use of accessory muscles to breathe, no audible wheezing or retractions  CV: no vascular discoloration or varicosities  SKIN: No erythema, rashes, excoriation, or breakdown. No evidence of infection.   JOINT/EXTREMITIES:bilateral Knee Exam: Inspection: No effusion  Tender: lateral joint line of right knee is extremely tender.  To a lesser degree, pain on left knee at medial joint line and anterior knee pain.  Active Range of Motion: pain with flexion and extension R>L.  She is reluctant to fully straighten knee.   Collateral ligaments intact but testing on right knee painful to patient.  Strength: quad  grossly intact  Special tests: normal Valgus stress test, normal Varus  Tender with this maneuver.   GAIT: antalgic    Diagnostic Modalities:  bilateral knee X-ray: varus alignment, narrowing present of the medial compartment,, osteophytes lateral compartment of right knee, lateral tilt bilateral patellofemoral compartment:    Independent visualization of the images was performed.    Impression: .    ICD-10-CM    1. Patellar maltracking, right M22.8X1 triamcinolone (KENALOG-40) injection 40 mg     CANCELED: XR Knee Right G/E 4 Views   2. Pain in lateral portion of right knee M25.561 triamcinolone (KENALOG-40) injection 40 mg     MR Knee Right w/o Contrast   3. Patellofemoral pain syndrome of both knees M22.2X1 triamcinolone (KENALOG-40) injection 40 mg    M22.2X2 MR Knee Right w/o Contrast     DRAIN/INJECT LARGE JOINT/BURSA   4. Primary osteoarthritis of both knees M17.0 triamcinolone (KENALOG-40) injection 40 mg     DRAIN/INJECT LARGE JOINT/BURSA     Patient with more pain than expected of lateral compartment of right knee concerning for meniscus tear or occult fracture from fall.  Some mild  osteoarthritis present bilateral medial compartment.     Plan:  All of the above pertinent physical exam and imaging modalities findings was reviewed with Katy.    A  was utilized.                                           CONSERVATIVE CARE:  I recommend conservative care for the patient to include NSAIDs, Tylenol, focused self directed physical therapy, weight loss.     An MRI is ordered to evaluate for possible occult fracture after fall or meniscus tear.                                           INJECTION PROCEDURE:  The patient was counseled about an  injection, including discussion of risks (including infection), contents of the injection, rationale for performing the injection, and expected benefits of the injection. The skin was prepped with alcohol and betadine and then utilizing sterile technique an injection of the left knee joint from the anterolateral approach in the seated position was performed. The injection consisted 1ml of Kenalog (40mg per 1ml) with 3ml 1% lidocaine plain. The patient tolerated the injection well, and there were no complications. The injection site was covered with a Band-Aid. The injection was performed by EMILY Velazco.     Restrictions: None, use as tolerated    Return to clinic to discuss test results of MRI  Re-x-ray on return: No      BP Readings from Last 1 Encounters:   02/24/20 110/72       BP noted to be well controlled today in office.     Scribed by  Sandra Khan PA-C   2/24/2020  4:15 PM    The information in this document, created by a scribe for me, accurately reflects the services I personally performed and the decisions made by me. I have reviewed and approved this document for accuracy    Tomi Maldonado D.O.

## 2020-02-24 NOTE — LETTER
2/24/2020         RE: Katy Islas  [address]  [place] [address]        Dear Colleague,    Thank you for referring your patient, Katy Islas, to the [facility]. Please see a copy of my visit note below.    Clinic Administered Medication Documentation    MEDICATION LIST:   Injection Documentation     Injection was administered by provider (please see MAR for given by information). Please see MAR and medication order for additional information.     Site: Joint injection   Medication Used: Kenalog 40mg   Exp: 10/2021  The following medication was given by Sandra Khan PA-C:     MEDICATION: Kenalog 40mg/1ml  ROUTE: Joint Injection  SITE: LEFT KNEE  DOSE: 1 mL  LOT #: [id number]  : [employer]  EXPIRATION DATE:  10/2021  NDC: [address]-4                            Office Visit-Follow up    Chief Complaint: Katy Islas is a 29 year old female who is being seen for   Chief Complaint   Patient presents with     RECHECK     Right anterior knee pain, right patellar maltracking      RECHECK     left knee acute pain: medial mensicus tear versus anterior knee pain       History of Present Illness:   Today's visit:   Patient states pain in bilateral knees.  She admits to off/on pain of right knee for many years and has had previous injections which help and physical therapy which she reports has made pain worse.  The past 3 weeks have been especially painful.  Patient states activities that are difficult for her include:  Going up and down stairs, kneeling or other bent knee activity.  She reports around 3 weeks ago she did fall on ice but did not notice injury at that time.  About 2 weeks ago she states she had difficulty walking especially with right knee.   This has improved since then.  She states she is getting back pain which she attributes to her knee pain. Pain in right knee is lateral knee pain and deep. The left knee has prepatellar and medial joint  line pain.    9/5/18 visit:  Returns to clinic today for her right knee. Communication done with  for sign language.  Previous intra-articular injection provided good relief up until recently.  The injection prior to that (IT band injection) did not provide much relief.  States same symptoms as before, anterior knee pain, worst with stairs, hills, and squatting.  Also being seen by spine for SI joint and did go through some combined SI joint and knee PT, 6 sessions, and this aggravated the knee.  No new injuries.  Has not had dedicated anterior knee PT before, has not tried bracing.  Notes she will in the near future undergoing bilateral SI joint fusions.    5/21/18 visit  Communication through an  sign language.  After her last visit she received a right knee distal IT band injection with that provider 1 month of very little relief.  She has been taking Flexeril.  Pain is worse when she goes from a kneeling to standing position.  No new traumas or injuries.  Right knee much worse than left.  Left is recently started bothering her  December 21, 2017 visit:  Katy Islas is a 27 year old female who is seen in consultation at the request of Amber Moss for evaluation of right knee pain.  Mechanism of Injury: No trauma or inciting event.  Location: right knee lateral, anterior  Duration of Pain: On and off for a few years  Rating of Pain:  moderate.    Pain Quality: aching and sharp  Pain is better with: Rest  Pain is worse with: Walking  Treatment so far consists of: Narcotics, previous injections into her knees years ago.   Associated Features: Swelling  Prior history of related problems:    utilized throughout her visit  She had back surgery October 2016.    Social History     Occupational History     Not on file   Tobacco Use     Smoking status: Current Every Day Smoker     Packs/day: 0.25     Years: 5.00     Pack years: 1.25     Types: Cigarettes      "Smokeless tobacco: Never Used     Tobacco comment: due to illness    Substance and Sexual Activity     Alcohol use: Not Currently     Comment: Not since last July 2018     Drug use: Yes     Types: Marijuana     Comment: Medical Marijuana currently-- More CBD     Sexual activity: Not Currently     Partners: Male     Birth control/protection: Condom, Injection     REVIEW OF SYSTEMS  General: negative for, night sweats, dizziness, fatigue  Resp: No shortness of breath and no cough  CV: negative for chest pain, syncope or near-syncope  GI: negative for nausea, vomiting and diarrhea  : negative for dysuria and hematuria  Musculoskeletal: as above  Neurologic: negative for syncope   Hematologic: negative for bleeding disorder    Physical Exam:  Vitals: /72   Ht 1.562 m (5' 1.5\")   Wt 85.3 kg (188 lb)   BMI 34.95 kg/m     BMI= Body mass index is 34.95 kg/m .  Constitutional: healthy, alert and no acute distress   Psychiatric: mentation appears normal and affect normal/bright  NEURO: no focal deficits  RESP: Normal with easy respirations and no use of accessory muscles to breathe, no audible wheezing or retractions  CV: no vascular discoloration or varicosities  SKIN: No erythema, rashes, excoriation, or breakdown. No evidence of infection.   JOINT/EXTREMITIES:bilateral Knee Exam: Inspection: No effusion  Tender: lateral joint line of right knee is extremely tender.  To a lesser degree, pain on left knee at medial joint line and anterior knee pain.  Active Range of Motion: pain with flexion and extension R>L.  She is reluctant to fully straighten knee.   Collateral ligaments intact but testing on right knee painful to patient.  Strength: quad  grossly intact  Special tests: normal Valgus stress test, normal Varus  Tender with this maneuver.   GAIT: antalgic    Diagnostic Modalities:  bilateral knee X-ray: varus alignment, narrowing present of the medial compartment,, osteophytes lateral compartment of right knee, " lateral tilt bilateral patellofemoral compartment:    Independent visualization of the images was performed.    Impression: .    ICD-10-CM    1. Patellar maltracking, right M22.8X1 triamcinolone (KENALOG-40) injection 40 mg     CANCELED: XR Knee Right G/E 4 Views   2. Pain in lateral portion of right knee M25.561 triamcinolone (KENALOG-40) injection 40 mg     MR Knee Right w/o Contrast   3. Patellofemoral pain syndrome of both knees M22.2X1 triamcinolone (KENALOG-40) injection 40 mg    M22.2X2 MR Knee Right w/o Contrast     DRAIN/INJECT LARGE JOINT/BURSA   4. Primary osteoarthritis of both knees M17.0 triamcinolone (KENALOG-40) injection 40 mg     DRAIN/INJECT LARGE JOINT/BURSA     Patient with more pain than expected of lateral compartment of right knee concerning for meniscus tear or occult fracture from fall.  Some mild osteoarthritis present bilateral medial compartment.     Plan:  All of the above pertinent physical exam and imaging modalities findings was reviewed with Katy.    A  was utilized.                                           CONSERVATIVE CARE:  I recommend conservative care for the patient to include NSAIDs, Tylenol, focused self directed physical therapy, weight loss.     An MRI is ordered to evaluate for possible occult fracture after fall or meniscus tear.                                           INJECTION PROCEDURE:  The patient was counseled about an  injection, including discussion of risks (including infection), contents of the injection, rationale for performing the injection, and expected benefits of the injection. The skin was prepped with alcohol and betadine and then utilizing sterile technique an injection of the left knee joint from the anterolateral approach in the seated position was performed. The injection consisted 1ml of Kenalog (40mg per 1ml) with 3ml 1% lidocaine plain. The patient tolerated the injection well, and there were no complications. The  injection site was covered with a Band-Aid. The injection was performed by EMILY Velazco.     Restrictions: None, use as tolerated    Return to clinic to discuss test results of MRI  Re-x-ray on return: No      BP Readings from Last 1 Encounters:   02/24/20 110/72       BP noted to be well controlled today in office.     Scribed by  Sandra Khan PA-C   2/24/2020  4:15 PM    The information in this document, created by a scribe for me, accurately reflects the services I personally performed and the decisions made by me. I have reviewed and approved this document for accuracy    Tomi Maldonado D.O.          Again, thank you for allowing me to participate in the care of your patient.        Sincerely,        Will Maldonado, DO

## 2020-02-24 NOTE — PROGRESS NOTES
Clinic Administered Medication Documentation    MEDICATION LIST:   Injection Documentation     Injection was administered by provider (please see MAR for given by information). Please see MAR and medication order for additional information.     Site: Joint injection   Medication Used: Kenalog 40mg   Exp: 10/2021  The following medication was given by Sandra Khan PA-C:     MEDICATION: Kenalog 40mg/1ml  ROUTE: Joint Injection  SITE: LEFT KNEE  DOSE: 1 mL  LOT #: [id number]  : [employer]  EXPIRATION DATE:  10/2021  NDC: [address]-3

## 2020-02-24 NOTE — TELEPHONE ENCOUNTER
Reason for Call:  Other appointment    Detailed comments: Patient calling stating she noticed on  her Mychart that she is only scheduled for xrays for her right knee, patient states that she has pain in both knees and would like xrays done on her left knee as well. Joel case.    Phone Number Patient can be reached at: Cell number on file:    Telephone Information:   Mobile [phone]       Best Time: Any    Can we leave a detailed message on this number? Not Applicable    Call taken on 2/24/2020 at 7:34 AM by Sally Haro

## 2020-02-25 ENCOUNTER — TELEPHONE (OUTPATIENT)
Dept: FAMILY MEDICINE | Facility: OTHER | Age: 30
End: 2020-02-25

## 2020-02-25 ENCOUNTER — MYC MEDICAL ADVICE (OUTPATIENT)
Dept: FAMILY MEDICINE | Facility: OTHER | Age: 30
End: 2020-02-25

## 2020-02-25 NOTE — TELEPHONE ENCOUNTER
Reason for call:  Same Day Appointment  Reason for Call:  Same Day Appointment, Requested Provider:  ZAYNAB Daley     PCP: Aura Rosario    Reason for visit: wants to get a cyst looked at and poss drained      Have you been treated for this in the past? Yes    Additional comments: pt is hoping CDL can see her tomorrow to take care of this cyst that is causing pain. Please advise.     Can we leave a detailed message on this number? YES    Phone number patient can be reached at: Cell number on file:    Telephone Information:   Mobile [phone]       Best Time: anytime    Call taken on 2/25/2020 at 11:24 AM by Sheryl Ruby

## 2020-02-26 ENCOUNTER — OFFICE VISIT (OUTPATIENT)
Dept: DERMATOLOGY | Facility: CLINIC | Age: 30
End: 2020-02-26
Payer: MEDICARE

## 2020-02-26 DIAGNOSIS — Z80.8 FAMILY HISTORY OF MELANOMA: Primary | ICD-10-CM

## 2020-02-26 DIAGNOSIS — L57.8 SUN-DAMAGED SKIN: ICD-10-CM

## 2020-02-26 DIAGNOSIS — D22.9 MULTIPLE BENIGN NEVI: ICD-10-CM

## 2020-02-26 DIAGNOSIS — Z86.018 HISTORY OF DYSPLASTIC NEVUS: ICD-10-CM

## 2020-02-26 DIAGNOSIS — K50.919 CROHN'S DISEASE WITH COMPLICATION, UNSPECIFIED GASTROINTESTINAL TRACT LOCATION (H): ICD-10-CM

## 2020-02-26 DIAGNOSIS — L81.4 SOLAR LENTIGO: ICD-10-CM

## 2020-02-26 PROCEDURE — 99203 OFFICE O/P NEW LOW 30 MIN: CPT | Performed by: DERMATOLOGY

## 2020-02-26 ASSESSMENT — PAIN SCALES - GENERAL: PAINLEVEL: NO PAIN (0)

## 2020-02-26 NOTE — NURSING NOTE
Katy Islas's goals for this visit include:   Chief Complaint   Patient presents with     Skin Check     No personal hx SC, father hx Melanoma. Not immunosuppressed. Areas of concern: left shoulder, abdomen, cyst on left ear.       She requests these members of her care team be copied on today's visit information: no    PCP: Aura Rosario    Referring Provider:  No referring provider defined for this encounter.    There were no vitals taken for this visit.    Do you need any medication refills at today's visit? No  Fara Merchant LPN

## 2020-02-26 NOTE — LETTER
2/26/2020         RE: Katy Islas  [address]  [place] [id number]        Dear Colleague,    Thank you for referring your patient, Katy Islas, to the [facility]. Please see a copy of my visit note below.    [facility] Dermatology Note    Dermatology Problem List:  1. Relevant medical history  - Crohns disease, not currently immunosuppressed   2. Family history of melanoma in her father  3. Hx of DN, biopsied by her PCP  - Mildly atypical nevus, left shoulder, s/p biopsy 7/6/18, s/p punch biopsy 7/23/18  - Mildly atypical nevus, right flank, s/p biopsy 7/16/18  - Mildly atypical nevus, mid back, s/p biopsy 7/17/17  - Mildly atypical nevus, right lower back, s/p biopsy 7/17/17  - Mildly atypical nevus, left upper back, ,s/p biopsy 7/17/17  4. Hx of benign biopsies taken by her PCP  - Intradermal nevus, right lower leg, s/p biopsy 8/28/17  - Compound nevus, central lower back, s/p biopsy 8/28/17  - Intradermal nevus, left forearm, s/p biopsy 7/6/18, s/p punch biopsy 7/23/18  - Intradermal nevus, right elbow, s/p biopsy 7/6/18    Encounter Date: Feb 26, 2020    CC:  Chief Complaint   Patient presents with     Skin Check     No personal hx SC, father hx Melanoma. Not immunosuppressed. Areas of concern: full body.     History of Present Illness:  Ms. Katy Islas is a 29 year old female who presents in self referral for a skin check. She was last seen at our clinic on 7/20/10 when she was treated for tinea capitis. She has no personal history of skin cancer, family history of melanoma in her father. Father passed away from melanoma after declining treatment. Recently, she has been seeing a PCP for her skin checks and has had many mildly DN nevi and normal nevi removed.     She presents with an  today for American sign language.     Today she reports several spots of concern that look somewhat atypical and darker in color to her her that she  would like examined to ensure they are not cancerous. One of which is on her left shoulder that was previously removed that she would like examined. Denies any tender, nonhealing, bleeding skin lesions. Additionally there is something in her left ear that was previously drained but significantly bothers her. She finds herself picking at this lesion often, sometimes in her sleep.     She notes that she she loves fishing outside in the summer time and never wears sunscreen. She notes that she has a history of many sun burns. She does typically get burns every summer. No other concerns addressed today.    Past Medical History:   Patient Active Problem List   Diagnosis     Hearing loss     Allergic rhinitis     Migraine     Benign neoplasm of skin of lower limb, including hip     Dysmenorrhea     Crohn's colitis (H)     Mild major depression (H)     Anxiety     Chronic pain     Bipolar disorder (H)     Marijuana abuse     Adjustment disorder with mixed anxiety and depressed mood     Herpes simplex virus infection     Gastroesophageal reflux disease without esophagitis     Bee sting allergy     Mild persistent asthma without complication     Chronic bilateral low back pain with left-sided sciatica     Vitamin D deficiency     Atypical mole     Lumbar disc disease with radiculopathy     S/P lumbar fusion     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)     Upper GI bleed - suspected     Tobacco use disorder     History of pseudoseizure     Iliotibial band syndrome affecting lower leg, right     Chondromalacia of right patellofemoral joint     Melanocytic nevus, unspecified location     Dysplastic skin lesion     Patellar maltracking, right     Right anterior knee pain     Hypophosphatemia     Class 1 obesity due to excess calories without serious comorbidity with body mass index (BMI) of 32.0 to 32.9 in adult     Mild intermittent asthma     Cervicalgia     Bulge of cervical disc without myelopathy     Hypertension  goal BP (blood pressure) < 130/80     Anal fissure     Low hematocrit     Elevated d-dimer     Abnormal uterine bleeding     Past Medical History:   Diagnosis Date     Abdominal pain 10/31- 11/4/2005    [facility] admit for [facility]     Allergic rhinitis, cause unspecified     Allergic rhinitis     Arthritis      C. difficile diarrhea     Past, no current diarrhea.     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, without mention of intractable epilepsy     pseudoseizures diagnosed after extensive neurologic eval     Migraine 07/21/12    D/C 07/22/12-[facility]     Migraine, unspecified, without mention of intractable migraine without mention of status migrainosus     Migraine     Mild intermittent asthma     mild intermittent     Mycoplasma infection in conditions classified elsewhere and of unspecified site      Other chronic pain     Back pain for 6 years     Renal disease     Kidney stones     Unspecified hearing loss     congenital hearing loss     Past Surgical History:   Procedure Laterality Date     AS REMOVAL OF COCCYX  10/18/2017     C FUSION OF SACROILIAC JOINT  10/26/2018     COLONOSCOPY  7/1/2009    [facility].     COLONOSCOPY N/A 7/17/2019    Procedure: Colonoscopy, With Polypectomy And Biopsy;  Surgeon: Edwin Conde MD;  Location: [facility] OR     COLONOSCOPY WITH CO2 INSUFFLATION N/A 7/17/2019    Procedure: COLONOSCOPY, WITH CO2 INSUFFLATION;  Surgeon: Edwin Conde MD;  Location: [facility] OR     COMBINED  ESOPHAGOSCOPY, GASTROSCOPY, DUODENOSCOPY (EGD) WITH CO2 INSUFFLATION N/A 4/12/2019    Procedure: COMBINED ESOPHAGOSCOPY, GASTROSCOPY, DUODENOSCOPY (EGD) WITH CO2 INSUFFLATION;  Surgeon: Edwin Conde MD;  Location: [facility] OR     ESOPHAGOSCOPY, GASTROSCOPY, DUODENOSCOPY (EGD), COMBINED N/A 4/12/2019    Procedure: Combined Esophagoscopy, Gastroscopy, Duodenoscopy (Egd), Biopsy Single Or Multiple;  Surgeon: Edwin Conde MD;  Location: [facility] OR     FUSION SPINE POSTERIOR MINIMALLY INVASIVE ONE LEVEL N/A 2/23/2017    Procedure: FUSION SPINE POSTERIOR MINIMALLY INVASIVE ONE LEVEL;  L4-5 Oblique Lateral Lumbar Interbody Fusion   Epidural steroid injection.   Transpedicular Bone marrow aspiration;  Surgeon: Jeniffer Eugene MD;  Location: [facility] OR     HC COLONOSCOPY THRU STOMA WITH BIOPSY  10/29/2003    Impression is that of normal appearing colonoscopy, without evidence of rectal bleeding.     HC COLONOSCOPY THRU STOMA, DIAGNOSTIC  10/00    normal     HC COLONOSCOPY THRU STOMA, DIAGNOSTIC  Oct 2009    Dr López- normal     HC EEG AWAKE AND SLEEP      abnormal     HC MRI BRAIN W/O CONTRAST  12/00    normal     HC REMOVAL GALLBLADDER  8/5/2009    [facility], [facility].      UGI ENDOSCOPY DIAG W BIOPSY  11/11/09    Normal esophagus     HC UGI ENDOSCOPY, SIMPLE EXAM  7/00, 10/00    mild chronic esophagitis and duodenitis, neg H pylori     HC UGI ENDOSCOPY, SIMPLE EXAM  01/20/2005    Esophagogastroduodenoscopy, colonoscopy with biopsies.  [facility] UGI ENDOSCOPY, SIMPLE EXAM  7/1/2009    [facility], [facility].      UGI ENDOSCOPY, SIMPLE EXAM  11/11/2009    attempted upper GI, pt. could not tolerate procedure:[facility]     ORTHOPEDIC SURGERY  October 19,2011    diskectomy L4-L5       Social History:  Patient smokes cigarettes. Uses an  for ASL but also reads lips well. Enjoys fishing outside. Does not wear sunscreen.    Family  History:  Family history of melanoma in her father.     Medications:  Current Outpatient Medications   Medication Sig Dispense Refill     acetaminophen (TYLENOL) 500 MG tablet Take 2 tablets (1,000 mg) by mouth 3 times daily as needed for mild pain 100 tablet 3     albuterol (PROAIR HFA/PROVENTIL HFA/VENTOLIN HFA) 108 (90 Base) MCG/ACT inhaler Inhale 2 puffs into the lungs every 6 hours as needed for shortness of breath / dyspnea or wheezing 3 Inhaler 3     albuterol (PROVENTIL) (2.5 MG/3ML) 0.083% neb solution Take 1 vial (2.5 mg) by nebulization every 6 hours as needed for shortness of breath / dyspnea or wheezing 50 vial 11     alum & mag hydroxide-simethicone (MAALOX ADVANCED MAX ST) 400-400-40 MG/5ML SUSP suspension Take 15 mLs by mouth every 4 hours as needed for indigestion (mix with lidocaine) 355 mL 11     ARIPiprazole (ABILIFY) 15 MG tablet Take 1 tablet (15 mg) by mouth At Bedtime 90 tablet 3     aspirin (ASA) 81 MG tablet Take 1 tablet (81 mg) by mouth daily 90 tablet 3     azelastine (OPTIVAR) 0.05 % ophthalmic solution Place 1 drop into both eyes 2 times daily 6 mL 11     benzonatate (TESSALON) 200 MG capsule Take 1 capsule (200 mg) by mouth 3 times daily as needed for cough 60 capsule 1     busPIRone (BUSPAR) 15 MG tablet Take 1 tablet (15 mg) by mouth 2 times daily 60 tablet 5     clindamycin (CLEOCIN) 150 MG capsule clindamycin HCl 150 mg capsule       cyclobenzaprine (FLEXERIL) 10 MG tablet Take 1 tablet (10 mg) by mouth 3 times daily as needed for muscle spasms or other (back pain) 90 tablet 3     diphenhydrAMINE (BENADRYL) 50 MG capsule Take 1-2 capsules ( mg) by mouth every 6 hours as needed for itching, allergies or sleep 90 capsule 3     DULoxetine (CYMBALTA) 60 MG EC capsule Take 1 capsule (60 mg) by mouth daily 90 capsule 3     EPINEPHrine (EPIPEN/ADRENACLICK/OR ANY BX GENERIC EQUIV) 0.3 MG/0.3ML injection 2-pack Inject 0.3 mLs (0.3 mg) into the muscle once as needed for anaphylaxis  0.6 mL 3     fexofenadine (ALLEGRA) 180 MG tablet Take 1 tablet (180 mg) by mouth daily 90 tablet 3     gabapentin (NEURONTIN) 300 MG capsule TAKE 1 TO 3 CAPSULES(300  MG) BY MOUTH THREE TIMES DAILY 270 capsule 0     lidocaine (XYLOCAINE) 2 % solution Swish and swallow 15 mLs in mouth every 4 hours as needed for other (GERD) ; Max 8 doses/24 hour period. Mix with 15 mLs Maalox or Mylanta. 100 mL 3     losartan (COZAAR) 100 MG tablet Take 1 tablet (100 mg) by mouth daily 90 tablet 3     medical cannabis (Patient's own supply) (The purpose of this order is to document that the patient reports taking medical cannabis.  This is not a prescription, and is not used to certify that the patient has a qualifying medical condition.) 0 Information only 0     medroxyPROGESTERone (DEPO-PROVERA) 150 MG/ML IM injection Inject 1 mL (150 mg) into the muscle every 3 months 3 mL 3     methocarbamol (ROBAXIN) 750 MG tablet Take 1 tablet (750 mg) by mouth 3 times daily as needed for muscle spasms 60 tablet 3     metoprolol succinate ER (TOPROL-XL) 200 MG 24 hr tablet Take 1 tablet (200 mg) by mouth daily 90 tablet 1     NONFORMULARY Apply 30 mLs topically 4 times daily       ondansetron (ZOFRAN-ODT) 8 MG ODT tab Take 1 tablet (8 mg) by mouth every 8 hours as needed for nausea 45 tablet 3     oxyCODONE-acetaminophen (PERCOCET) 5-325 MG tablet Take 1 tablet by mouth       pantoprazole (PROTONIX) 40 MG EC tablet Take 1 tablet (40 mg) by mouth 2 times daily Take 30-60 minutes before a meal. 180 tablet 3     prazosin (MINIPRESS) 1 MG capsule Take 1 mg by mouth At Bedtime  5     prochlorperazine (COMPAZINE) 10 MG tablet Take 10 mg by mouth       rizatriptan (MAXALT-MLT) 5 MG ODT Take 1-2 tablets (5-10 mg) by mouth at onset of headache for migraine May repeat in 2 hours. Max 6 tablets/24 hours. 18 tablet 3     sucralfate (CARAFATE) 1 GM tablet Take 1 tablet (1 g) by mouth 4 times daily as needed (heartburn) 360 tablet 3     valACYclovir  (VALTREX) 1000 mg tablet 2 tabs at onset of mouth sores.  Repeat dose in 12 hours.         Allergies   Allergen Reactions     Iohexol Hives     Other reaction(s): Hives  IV contrast; patient states she tolerates contrast if she gets benadryl before  IV contrast; patient states she tolerates contrast if she gets benadryl before     Ativan [Lorazepam] Hives     At the IV site     Baclofen      hives     Bees Hives, Swelling and Difficulty breathing     Caffeine      Contrast Dye      Hives,   Updated 5/10/2016 CT Contrast.     Iodine Hives     Methocarbamol Swelling     Metoclopramide      Other reaction(s): Tremors  LW Reaction: shaking/sweating     Midazolam      Other reaction(s): Agitation     Monosodium Glutamate      Morphine Other (See Comments)     Difficulty with urination     Nsaids Other (See Comments)     GI BLEED x2     Other (Do Not Use) Other (See Comments)     Xanaflex- pt becomes disoriented and loses bladder control     Reglan [Metoclopramide Hcl] Other (See Comments)     shaking     Soma [Carisoprodol] Visual Disturbance     Sleep walking     Tizanidine      Topamax Other (See Comments)     Topamax [Topiramate] Nausea     Tingling  GI/Vomit     Tramadol      Severe Headache, Seizure Risk     Tylenol W/Codeine [Acetaminophen-Codeine] Nausea and Itching     Tylenol 3     Valium Other (See Comments)     Becomes aggressive and angry     Versed Other (See Comments)     Zolmitriptan      Makes face feel like its twitching     Droperidol Anxiety     Flu Virus Vaccine Rash      Arm swelling       Review of Systems:  -Constitutional: Patient is otherwise feeling well, in usual state of health.   -Skin: As above in HPI. No additional skin concerns.    Physical exam:  Vitals: There were no vitals taken for this visit.  GEN: This is a well developed, well-nourished female in no acute distress, in a pleasant mood.    SKIN: Total skin excluding the undergarment areas was performed. The exam included the  head/face, neck, both arms, chest, back, abdomen, both legs, digits and/or nails and buttocks.   - Renner Type II  - Scattered brown macules on sun exposed areas.  - Left triangular fossae: two scabs, no dermatitis.   - Many circular scars scattered on the back from previous biopsies. No pigment recurrence in any.   - Half and half fingernails  - Multiple regular brown pigmented macules and papules are identified on the body. No significant atypia in any. Around 50 nevi in all.  - No other lesions of concern on areas examined.     Impression/Plan:    1. Crohns disease, no currently immunosuppressed. Discussed increased risk of melanoma with crohns disease. Recommended yearly skin checks.    2. History of mildly DN, no evidence of recurrence. Discussed diagnosis and that these are considered a variant of normal rather than precancerous lesions. People with more than 5 dysplastic nevi have an overall increased risk for melanoma.  Recommended yearly skin checks     3. Family history of melanoma in her father.     4. Sun damaged skin with solar lentigines  - Recommend sunscreens SPF #30 or greater, protective clothing and avoidance of tanning beds.    5. Multiple clinically benign nevi  - No further intervention required. Patient to report changes.   - Patient reassured of the benign nature of these lesions.    6. Half and half fingernails. Discussed that this is most likely a variant of normal but can occassionally be associated with kidney disease. Patient's most recent BMP was wnl so no further evaluation required.     Follow-up in 1 year for skin check, earlier for new or changing lesions.     Staff Involved:  Scribe/Staff    Scribe Disclosure  I, Shea Hillman, am serving as a scribe to document services personally performed by Dr. Sandra Hou MD, based on data collection and the provider's statements to me.     Provider Disclosure:   The documentation recorded by the scribe accurately reflects the  services I personally performed and the decisions made by me.    Sandra Hou MD    Department of Dermatology  [facility]: Phone: [phone], Fax:[phone]  [facility]: Phone: [phone], Fax: [phone]              Again, thank you for allowing me to participate in the care of your patient.        Sincerely,        Sandra Hou MD

## 2020-02-26 NOTE — PROGRESS NOTES
[facility] Dermatology Note    Dermatology Problem List:  1. Relevant medical history  - Crohns disease, not currently immunosuppressed   2. Family history of melanoma in her father  3. Hx of DN, biopsied by her PCP  - Mildly atypical nevus, left shoulder, s/p biopsy 7/6/18, s/p punch biopsy 7/23/18  - Mildly atypical nevus, right flank, s/p biopsy 7/16/18  - Mildly atypical nevus, mid back, s/p biopsy 7/17/17  - Mildly atypical nevus, right lower back, s/p biopsy 7/17/17  - Mildly atypical nevus, left upper back, ,s/p biopsy 7/17/17  4. Hx of benign biopsies taken by her PCP  - Intradermal nevus, right lower leg, s/p biopsy 8/28/17  - Compound nevus, central lower back, s/p biopsy 8/28/17  - Intradermal nevus, left forearm, s/p biopsy 7/6/18, s/p punch biopsy 7/23/18  - Intradermal nevus, right elbow, s/p biopsy 7/6/18    Encounter Date: Feb 26, 2020    CC:  Chief Complaint   Patient presents with     Skin Check     No personal hx SC, father hx Melanoma. Not immunosuppressed. Areas of concern: full body.     History of Present Illness:  Ms. Katy Islas is a 29 year old female who presents in self referral for a skin check. She was last seen at our clinic on 7/20/10 when she was treated for tinea capitis. She has no personal history of skin cancer, family history of melanoma in her father. Father passed away from melanoma after declining treatment. Recently, she has been seeing a PCP for her skin checks and has had many mildly DN nevi and normal nevi removed.     She presents with an  today for American sign language.     Today she reports several spots of concern that look somewhat atypical and darker in color to her her that she would like examined to ensure they are not cancerous. One of which is on her left shoulder that was previously removed that she would like examined. Denies any tender, nonhealing, bleeding skin lesions. Additionally there is something in her left ear  that was previously drained but significantly bothers her. She finds herself picking at this lesion often, sometimes in her sleep.     She notes that she she loves fishing outside in the summer time and never wears sunscreen. She notes that she has a history of many sun burns. She does typically get burns every summer. No other concerns addressed today.    Past Medical History:   Patient Active Problem List   Diagnosis     Hearing loss     Allergic rhinitis     Migraine     Benign neoplasm of skin of lower limb, including hip     Dysmenorrhea     Crohn's colitis (H)     Mild major depression (H)     Anxiety     Chronic pain     Bipolar disorder (H)     Marijuana abuse     Adjustment disorder with mixed anxiety and depressed mood     Herpes simplex virus infection     Gastroesophageal reflux disease without esophagitis     Bee sting allergy     Mild persistent asthma without complication     Chronic bilateral low back pain with left-sided sciatica     Vitamin D deficiency     Atypical mole     Lumbar disc disease with radiculopathy     S/P lumbar fusion     Requires teleStrevus device for the deaf (TTD)     Upper GI bleed - suspected     Tobacco use disorder     History of pseudoseizure     Iliotibial band syndrome affecting lower leg, right     Chondromalacia of right patellofemoral joint     Melanocytic nevus, unspecified location     Dysplastic skin lesion     Patellar maltracking, right     Right anterior knee pain     Hypophosphatemia     Class 1 obesity due to excess calories without serious comorbidity with body mass index (BMI) of 32.0 to 32.9 in adult     Mild intermittent asthma     Cervicalgia     Bulge of cervical disc without myelopathy     Hypertension goal BP (blood pressure) < 130/80     Anal fissure     Low hematocrit     Elevated d-dimer     Abnormal uterine bleeding     Past Medical History:   Diagnosis Date     Abdominal pain 10/31- 11/4/2005    [facility]     Allergic  rhinitis, cause unspecified     Allergic rhinitis     Arthritis      C. difficile diarrhea     Past, no current diarrhea.     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, without mention of intractable epilepsy     pseudoseizures diagnosed after extensive neurologic eval     Migraine 07/21/12    D/C 07/22/12-[facility]     Migraine, unspecified, without mention of intractable migraine without mention of status migrainosus     Migraine     Mild intermittent asthma     mild intermittent     Mycoplasma infection in conditions classified elsewhere and of unspecified site      Other chronic pain     Back pain for 6 years     Renal disease     Kidney stones     Unspecified hearing loss     congenital hearing loss     Past Surgical History:   Procedure Laterality Date     AS REMOVAL OF COCCYX  10/18/2017     C FUSION OF SACROILIAC JOINT  10/26/2018     COLONOSCOPY  7/1/2009    [facility] [place].     COLONOSCOPY N/A 7/17/2019    Procedure: Colonoscopy, With Polypectomy And Biopsy;  Surgeon: Edwin Conde MD;  Location: [facility] OR     COLONOSCOPY WITH CO2 INSUFFLATION N/A 7/17/2019    Procedure: COLONOSCOPY, WITH CO2 INSUFFLATION;  Surgeon: Edwin Conde MD;  Location: [facility] OR     COMBINED ESOPHAGOSCOPY, GASTROSCOPY, DUODENOSCOPY (EGD) WITH CO2 INSUFFLATION N/A 4/12/2019    Procedure: COMBINED ESOPHAGOSCOPY, GASTROSCOPY, DUODENOSCOPY (EGD) WITH CO2 INSUFFLATION;  Surgeon: Edwin Conde MD;  Location: [facility] OR     ESOPHAGOSCOPY,  GASTROSCOPY, DUODENOSCOPY (EGD), COMBINED N/A 4/12/2019    Procedure: Combined Esophagoscopy, Gastroscopy, Duodenoscopy (Egd), Biopsy Single Or Multiple;  Surgeon: Edwin Conde MD;  Location: [facility] OR     FUSION SPINE POSTERIOR MINIMALLY INVASIVE ONE LEVEL N/A 2/23/2017    Procedure: FUSION SPINE POSTERIOR MINIMALLY INVASIVE ONE LEVEL;  L4-5 Oblique Lateral Lumbar Interbody Fusion   Epidural steroid injection.   Transpedicular Bone marrow aspiration;  Surgeon: Jeniffer Eugene MD;  Location: [facility] OR     HC COLONOSCOPY THRU STOMA WITH BIOPSY  10/29/2003    Impression is that of normal appearing colonoscopy, without evidence of rectal bleeding.     HC COLONOSCOPY THRU STOMA, DIAGNOSTIC  10/00    normal     HC COLONOSCOPY THRU STOMA, DIAGNOSTIC  Oct 2009    Dr López- normal     HC EEG AWAKE AND SLEEP      abnormal     HC MRI BRAIN W/O CONTRAST  12/00    normal     HC REMOVAL GALLBLADDER  8/5/2009    [facility], [facility].     HC UGI ENDOSCOPY DIAG W BIOPSY  11/11/09    Normal esophagus     HC UGI ENDOSCOPY, SIMPLE EXAM  7/00, 10/00    mild chronic esophagitis and duodenitis, neg H pylori     HC UGI ENDOSCOPY, SIMPLE EXAM  01/20/2005    Esophagogastroduodenoscopy, colonoscopy with biopsies.  [facility] UGI ENDOSCOPY, SIMPLE EXAM  7/1/2009    [facility], [facility].      UGI ENDOSCOPY, SIMPLE EXAM  11/11/2009    attempted upper GI, pt. could not tolerate procedure:[facility]     ORTHOPEDIC SURGERY  October 19,2011    diskectomy L4-L5       Social History:  Patient smokes cigarettes. Uses an  for ASL but also reads lips well. Enjoys fishing outside. Does not wear sunscreen.    Family History:  Family history of melanoma in her father.     Medications:  Current Outpatient Medications   Medication Sig Dispense Refill     acetaminophen (TYLENOL) 500 MG tablet Take 2 tablets (1,000 mg) by mouth 3 times daily as needed for mild pain 100 tablet 3      albuterol (PROAIR HFA/PROVENTIL HFA/VENTOLIN HFA) 108 (90 Base) MCG/ACT inhaler Inhale 2 puffs into the lungs every 6 hours as needed for shortness of breath / dyspnea or wheezing 3 Inhaler 3     albuterol (PROVENTIL) (2.5 MG/3ML) 0.083% neb solution Take 1 vial (2.5 mg) by nebulization every 6 hours as needed for shortness of breath / dyspnea or wheezing 50 vial 11     alum & mag hydroxide-simethicone (MAALOX ADVANCED MAX ST) 400-400-40 MG/5ML SUSP suspension Take 15 mLs by mouth every 4 hours as needed for indigestion (mix with lidocaine) 355 mL 11     ARIPiprazole (ABILIFY) 15 MG tablet Take 1 tablet (15 mg) by mouth At Bedtime 90 tablet 3     aspirin (ASA) 81 MG tablet Take 1 tablet (81 mg) by mouth daily 90 tablet 3     azelastine (OPTIVAR) 0.05 % ophthalmic solution Place 1 drop into both eyes 2 times daily 6 mL 11     benzonatate (TESSALON) 200 MG capsule Take 1 capsule (200 mg) by mouth 3 times daily as needed for cough 60 capsule 1     busPIRone (BUSPAR) 15 MG tablet Take 1 tablet (15 mg) by mouth 2 times daily 60 tablet 5     clindamycin (CLEOCIN) 150 MG capsule clindamycin HCl 150 mg capsule       cyclobenzaprine (FLEXERIL) 10 MG tablet Take 1 tablet (10 mg) by mouth 3 times daily as needed for muscle spasms or other (back pain) 90 tablet 3     diphenhydrAMINE (BENADRYL) 50 MG capsule Take 1-2 capsules ( mg) by mouth every 6 hours as needed for itching, allergies or sleep 90 capsule 3     DULoxetine (CYMBALTA) 60 MG EC capsule Take 1 capsule (60 mg) by mouth daily 90 capsule 3     EPINEPHrine (EPIPEN/ADRENACLICK/OR ANY BX GENERIC EQUIV) 0.3 MG/0.3ML injection 2-pack Inject 0.3 mLs (0.3 mg) into the muscle once as needed for anaphylaxis 0.6 mL 3     fexofenadine (ALLEGRA) 180 MG tablet Take 1 tablet (180 mg) by mouth daily 90 tablet 3     gabapentin (NEURONTIN) 300 MG capsule TAKE 1 TO 3 CAPSULES(300  MG) BY MOUTH THREE TIMES DAILY 270 capsule 0     lidocaine (XYLOCAINE) 2 % solution Swish and  swallow 15 mLs in mouth every 4 hours as needed for other (GERD) ; Max 8 doses/24 hour period. Mix with 15 mLs Maalox or Mylanta. 100 mL 3     losartan (COZAAR) 100 MG tablet Take 1 tablet (100 mg) by mouth daily 90 tablet 3     medical cannabis (Patient's own supply) (The purpose of this order is to document that the patient reports taking medical cannabis.  This is not a prescription, and is not used to certify that the patient has a qualifying medical condition.) 0 Information only 0     medroxyPROGESTERone (DEPO-PROVERA) 150 MG/ML IM injection Inject 1 mL (150 mg) into the muscle every 3 months 3 mL 3     methocarbamol (ROBAXIN) 750 MG tablet Take 1 tablet (750 mg) by mouth 3 times daily as needed for muscle spasms 60 tablet 3     metoprolol succinate ER (TOPROL-XL) 200 MG 24 hr tablet Take 1 tablet (200 mg) by mouth daily 90 tablet 1     NONFORMULARY Apply 30 mLs topically 4 times daily       ondansetron (ZOFRAN-ODT) 8 MG ODT tab Take 1 tablet (8 mg) by mouth every 8 hours as needed for nausea 45 tablet 3     oxyCODONE-acetaminophen (PERCOCET) 5-325 MG tablet Take 1 tablet by mouth       pantoprazole (PROTONIX) 40 MG EC tablet Take 1 tablet (40 mg) by mouth 2 times daily Take 30-60 minutes before a meal. 180 tablet 3     prazosin (MINIPRESS) 1 MG capsule Take 1 mg by mouth At Bedtime  5     prochlorperazine (COMPAZINE) 10 MG tablet Take 10 mg by mouth       rizatriptan (MAXALT-MLT) 5 MG ODT Take 1-2 tablets (5-10 mg) by mouth at onset of headache for migraine May repeat in 2 hours. Max 6 tablets/24 hours. 18 tablet 3     sucralfate (CARAFATE) 1 GM tablet Take 1 tablet (1 g) by mouth 4 times daily as needed (heartburn) 360 tablet 3     valACYclovir (VALTREX) 1000 mg tablet 2 tabs at onset of mouth sores.  Repeat dose in 12 hours.         Allergies   Allergen Reactions     Iohexol Hives     Other reaction(s): Hives  IV contrast; patient states she tolerates contrast if she gets benadryl before  IV contrast;  patient states she tolerates contrast if she gets benadryl before     Ativan [Lorazepam] Hives     At the IV site     Baclofen      hives     Bees Hives, Swelling and Difficulty breathing     Caffeine      Contrast Dye      Hives,   Updated 5/10/2016 CT Contrast.     Iodine Hives     Methocarbamol Swelling     Metoclopramide      Other reaction(s): Tremors  LW Reaction: shaking/sweating     Midazolam      Other reaction(s): Agitation     Monosodium Glutamate      Morphine Other (See Comments)     Difficulty with urination     Nsaids Other (See Comments)     GI BLEED x2     Other (Do Not Use) Other (See Comments)     Xanaflex- pt becomes disoriented and loses bladder control     Reglan [Metoclopramide Hcl] Other (See Comments)     shaking     Soma [Carisoprodol] Visual Disturbance     Sleep walking     Tizanidine      Topamax Other (See Comments)     Topamax [Topiramate] Nausea     Tingling  GI/Vomit     Tramadol      Severe Headache, Seizure Risk     Tylenol W/Codeine [Acetaminophen-Codeine] Nausea and Itching     Tylenol 3     Valium Other (See Comments)     Becomes aggressive and angry     Versed Other (See Comments)     Zolmitriptan      Makes face feel like its twitching     Droperidol Anxiety     Flu Virus Vaccine Rash      Arm swelling       Review of Systems:  -Constitutional: Patient is otherwise feeling well, in usual state of health.   -Skin: As above in HPI. No additional skin concerns.    Physical exam:  Vitals: There were no vitals taken for this visit.  GEN: This is a well developed, well-nourished female in no acute distress, in a pleasant mood.    SKIN: Total skin excluding the undergarment areas was performed. The exam included the head/face, neck, both arms, chest, back, abdomen, both legs, digits and/or nails and buttocks.   - Renner Type II  - Scattered brown macules on sun exposed areas.  - Left triangular fossae: two scabs, no dermatitis.   - Many circular scars scattered on the back from  previous biopsies. No pigment recurrence in any.   - Half and half fingernails  - Multiple regular brown pigmented macules and papules are identified on the body. No significant atypia in any. Around 50 nevi in all.  - No other lesions of concern on areas examined.     Impression/Plan:    1. Crohns disease, no currently immunosuppressed. Discussed increased risk of melanoma with crohns disease. Recommended yearly skin checks.    2. History of mildly DN, no evidence of recurrence. Discussed diagnosis and that these are considered a variant of normal rather than precancerous lesions. People with more than 5 dysplastic nevi have an overall increased risk for melanoma.  Recommended yearly skin checks     3. Family history of melanoma in her father.     4. Sun damaged skin with solar lentigines  - Recommend sunscreens SPF #30 or greater, protective clothing and avoidance of tanning beds.    5. Multiple clinically benign nevi  - No further intervention required. Patient to report changes.   - Patient reassured of the benign nature of these lesions.    6. Half and half fingernails. Discussed that this is most likely a variant of normal but can occassionally be associated with kidney disease. Patient's most recent BMP was wnl so no further evaluation required.     Follow-up in 1 year for skin check, earlier for new or changing lesions.     Staff Involved:  Scribe/Staff    Scribe Disclosure  I, Shea Hillman, am serving as a scribe to document services personally performed by Dr. Sandra Hou MD, based on data collection and the provider's statements to me.     Provider Disclosure:   The documentation recorded by the scribe accurately reflects the services I personally performed and the decisions made by me.    Sandra Hou MD    Department of Dermatology  [facility]: Phone: [phone], Fax:[phone]  [facility]  [facility]: Phone: [phone], Fax: [phone]

## 2020-03-02 ENCOUNTER — MYC REFILL (OUTPATIENT)
Dept: FAMILY MEDICINE | Facility: OTHER | Age: 30
End: 2020-03-02

## 2020-03-02 DIAGNOSIS — G89.4 CHRONIC PAIN SYNDROME: ICD-10-CM

## 2020-03-03 RX ORDER — METHOCARBAMOL 750 MG/1
750 TABLET, FILM COATED ORAL 3 TIMES DAILY PRN
Qty: 60 TABLET | Refills: 3 | Status: SHIPPED | OUTPATIENT
Start: 2020-03-03 | End: 2020-05-26

## 2020-03-03 NOTE — TELEPHONE ENCOUNTER
Pending Prescriptions:                       Disp   Refills    methocarbamol (ROBAXIN) 750 MG tablet      60 tab*3        Sig: Take 1 tablet (750 mg) by mouth 3 times daily as           needed for muscle spasms    Last Written Prescription Date:  12/3/2019  Last Fill Quantity: 60,  # refills: 3   Last office visit: 2/19/2020 with prescribing provider:     Future Office Visit:   Next 5 appointments (look out 90 days)    Mar 16, 2020  8:00 AM CDT  Pre-Op physical with Aura Rosario PA-C  [facility] ([facility]) [address] [place] [id number]  [phone]   Mar 31, 2020  8:00 AM CDT  SHORT with Monique Berumen DO  [facility] ([facility]) [address] [place] [id number]  [phone]   May 05, 2020  8:00 AM CDT  SHORT with Monique Berumen DO  [facility] ([facility]) [address] [place] [id number]  [phone]           Routing refill request to provider for review/approval because:  Drug not on the [facility] refill protocol     Claudine Jimenez, MSN, RN

## 2020-03-04 ENCOUNTER — OFFICE VISIT (OUTPATIENT)
Dept: SURGERY | Facility: CLINIC | Age: 30
End: 2020-03-04
Payer: MEDICARE

## 2020-03-04 VITALS
TEMPERATURE: 98.1 F | BODY MASS INDEX: 34.6 KG/M2 | SYSTOLIC BLOOD PRESSURE: 126 MMHG | WEIGHT: 188 LBS | HEIGHT: 62 IN | DIASTOLIC BLOOD PRESSURE: 80 MMHG

## 2020-03-04 DIAGNOSIS — K61.0 ANAL ABSCESS: Primary | ICD-10-CM

## 2020-03-04 PROCEDURE — 99203 OFFICE O/P NEW LOW 30 MIN: CPT | Performed by: SURGERY

## 2020-03-04 PROCEDURE — T1013 SIGN LANG/ORAL INTERPRETER: HCPCS | Mod: U3 | Performed by: SURGERY

## 2020-03-04 ASSESSMENT — MIFFLIN-ST. JEOR: SCORE: 1518.07

## 2020-03-04 NOTE — LETTER
3/4/2020         RE: Katy Islas  [address]  [place] [address]        Dear Colleague,    Thank you for referring your patient, Katy Islas, to the [facility]. Please see a copy of my visit note below.    Patient seen in consultation for perianal abscess    HPI:  Patient is a 29 year old female who originally had made an appt with me for presumed hemorrhoids and anal pain actually visited the ED last night in [place] and was found to have a chris-anal abscess that was incised and drained. She was given a prescription of augmentin. She has plans for a hysterectomy later this month. She feels much better since the drainage happened last night. No continued fevers. Mild bloody drainage from the incision. Katy is deaf and an  is here to assist today. She does have the diagnosis of Crohn's disease.      Review Of Systems    Skin: negative  Ears/Nose/Throat: negative  Respiratory: No shortness of breath, dyspnea on exertion, cough, or hemoptysis  Cardiovascular: negative  Gastrointestinal: as above  Genitourinary: negative  Musculoskeletal: negative  Neurologic: negative  Hematologic/Lymphatic/Immunologic: negative  Endocrine: negative      Past Medical History:   Diagnosis Date     Abdominal pain 10/31- 11/4/2005    [facility] admit for Cro[facility]     Allergic rhinitis, cause unspecified     Allergic rhinitis     Arthritis      C. difficile diarrhea     Past, no current diarrhea.     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, without mention of intractable epilepsy     pseudoseizures diagnosed after extensive neurologic eval     Migraine 07/21/12    D/C 07/22/12-[facility]     Migraine, unspecified, without mention of intractable migraine without mention of status migrainosus     Migraine     Mild intermittent asthma     mild intermittent     Mycoplasma infection in conditions classified elsewhere and of unspecified site      Other chronic pain     Back pain for 6 years     Renal disease     Kidney stones     Unspecified hearing loss     congenital hearing loss       Past Surgical History:   Procedure Laterality Date     AS REMOVAL OF COCCYX  10/18/2017     C FUSION OF SACROILIAC JOINT  10/26/2018     COLONOSCOPY  7/1/2009    [facility]     COLONOSCOPY N/A 7/17/2019    Procedure: Colonoscopy, With Polypectomy And Biopsy;  Surgeon: Edwin Conde MD;  Location: [facility] OR     COLONOSCOPY WITH CO2 INSUFFLATION N/A 7/17/2019    Procedure: COLONOSCOPY, WITH CO2 INSUFFLATION;  Surgeon: Edwin Conde MD;  Location: [facility] OR     COMBINED ESOPHAGOSCOPY, GASTROSCOPY, DUODENOSCOPY (EGD) WITH CO2 INSUFFLATION N/A 4/12/2019    Procedure: COMBINED ESOPHAGOSCOPY, GASTROSCOPY, DUODENOSCOPY (EGD) WITH CO2 INSUFFLATION;  Surgeon: Edwin Conde MD;  Location: [facility] OR     ESOPHAGOSCOPY, GASTROSCOPY, DUODENOSCOPY (EGD), COMBINED N/A 4/12/2019    Procedure: Combined Esophagoscopy, Gastroscopy, Duodenoscopy (Egd), Biopsy Single Or Multiple;  Surgeon: Edwin Conde MD;  Location: [facility] OR     FUSION SPINE POSTERIOR MINIMALLY INVASIVE ONE LEVEL N/A 2/23/2017    Procedure: FUSION SPINE POSTERIOR MINIMALLY INVASIVE ONE LEVEL;  L4-5 Oblique Lateral Lumbar Interbody Fusion   Epidural steroid injection.   Transpedicular Bone marrow aspiration;  Surgeon: Jeniffer Eugene MD;  Location: [facility] OR     HC COLONOSCOPY THRU STOMA WITH BIOPSY  10/29/2003    Impression is that of normal  appearing colonoscopy, without evidence of rectal bleeding.     HC COLONOSCOPY THRU STOMA, DIAGNOSTIC  10/00    normal     HC COLONOSCOPY THRU STOMA, DIAGNOSTIC  Oct 2009    Dr López- normal     HC EEG AWAKE AND SLEEP      abnormal     HC MRI BRAIN W/O CONTRAST  12/00    normal     HC REMOVAL GALLBLADDER  8/5/2009    [facility].     HC UGI ENDOSCOPY DIAG W BIOPSY  11/11/09    Normal esophagus     HC UGI ENDOSCOPY, SIMPLE EXAM  7/00, 10/00    mild chronic esophagitis and duodenitis, neg H pylori     HC UGI ENDOSCOPY, SIMPLE EXAM  01/20/2005    Esophagogastroduodenoscopy, colonoscopy with biopsies.  [facility]     HC UGI ENDOSCOPY, SIMPLE EXAM  7/1/2009    [facility], [facility].      UGI ENDOSCOPY, SIMPLE EXAM  11/11/2009    attempted upper GI, pt. could not tolerate procedure:[facility]     ORTHOPEDIC SURGERY  October 19,2011    diskectomy L4-L5       Family History   Problem Relation Age of Onset     Gastrointestinal Disease Brother         severe Crohn's     Neurologic Disorder Brother         Seizures post head injury     Depression Brother      Substance Abuse Brother      Genitourinary Problems Father         kidney stones     Diabetes Father      Heart Disease Father         Open heart surgery     Breast Cancer Maternal Grandmother      Parkinsonism Maternal Grandmother      Cerebrovascular Disease Paternal Grandmother      Cancer Maternal Grandfather         Lung     Cardiovascular Paternal Grandfather         Heart Attack     Substance Abuse Mother         in recovery x1 year      Lung Cancer Paternal Uncle      Cancer Paternal Uncle      Lung Cancer Maternal Uncle        Social History     Socioeconomic History     Marital status: Single     Spouse name: Not on file     Number of children: Not on file     Years of education: Not on file     Highest education level: Not on file   Occupational History     Not on file   Social Needs     Financial  resource strain: Not on file     Food insecurity:     Worry: Not on file     Inability: Not on file     Transportation needs:     Medical: Not on file     Non-medical: Not on file   Tobacco Use     Smoking status: Current Every Day Smoker     Packs/day: 0.25     Years: 5.00     Pack years: 1.25     Types: Cigarettes     Smokeless tobacco: Never Used     Tobacco comment: due to illness    Substance and Sexual Activity     Alcohol use: Not Currently     Comment: Not since last July 2018     Drug use: Yes     Types: Marijuana     Comment: Medical Marijuana currently-- More CBD     Sexual activity: Not Currently     Partners: Male     Birth control/protection: Condom, Injection   Lifestyle     Physical activity:     Days per week: Not on file     Minutes per session: Not on file     Stress: Not on file   Relationships     Social connections:     Talks on phone: Not on file     Gets together: Not on file     Attends Jainism service: Not on file     Active member of club or organization: Not on file     Attends meetings of clubs or organizations: Not on file     Relationship status: Not on file     Intimate partner violence:     Fear of current or ex partner: Not on file     Emotionally abused: Not on file     Physically abused: Not on file     Forced sexual activity: Not on file   Other Topics Concern      Service No     Blood Transfusions No     Caffeine Concern No     Occupational Exposure No     Hobby Hazards No     Sleep Concern No     Stress Concern No     Weight Concern No     Special Diet No     Back Care No     Exercise Yes     Bike Helmet No     Seat Belt Yes     Self-Exams Yes     Parent/sibling w/ CABG, MI or angioplasty before 65F 55M? Yes     Comment: father late 30's early 40's    Social History Narrative     Not on file       Current Outpatient Medications   Medication Sig Dispense Refill     acetaminophen (TYLENOL) 500 MG tablet Take 2 tablets (1,000 mg) by mouth 3 times daily as needed for mild  pain 100 tablet 3     albuterol (PROAIR HFA/PROVENTIL HFA/VENTOLIN HFA) 108 (90 Base) MCG/ACT inhaler Inhale 2 puffs into the lungs every 6 hours as needed for shortness of breath / dyspnea or wheezing 3 Inhaler 3     albuterol (PROVENTIL) (2.5 MG/3ML) 0.083% neb solution Take 1 vial (2.5 mg) by nebulization every 6 hours as needed for shortness of breath / dyspnea or wheezing 50 vial 11     alum & mag hydroxide-simethicone (MAALOX ADVANCED MAX ST) 400-400-40 MG/5ML SUSP suspension Take 15 mLs by mouth every 4 hours as needed for indigestion (mix with lidocaine) 355 mL 11     ARIPiprazole (ABILIFY) 15 MG tablet Take 1 tablet (15 mg) by mouth At Bedtime 90 tablet 3     aspirin (ASA) 81 MG tablet Take 1 tablet (81 mg) by mouth daily 90 tablet 3     azelastine (OPTIVAR) 0.05 % ophthalmic solution Place 1 drop into both eyes 2 times daily 6 mL 11     benzonatate (TESSALON) 200 MG capsule Take 1 capsule (200 mg) by mouth 3 times daily as needed for cough 60 capsule 1     busPIRone (BUSPAR) 15 MG tablet Take 1 tablet (15 mg) by mouth 2 times daily 60 tablet 5     clindamycin (CLEOCIN) 150 MG capsule clindamycin HCl 150 mg capsule       cyclobenzaprine (FLEXERIL) 10 MG tablet Take 1 tablet (10 mg) by mouth 3 times daily as needed for muscle spasms or other (back pain) 90 tablet 3     diphenhydrAMINE (BENADRYL) 50 MG capsule Take 1-2 capsules ( mg) by mouth every 6 hours as needed for itching, allergies or sleep 90 capsule 3     DULoxetine (CYMBALTA) 60 MG EC capsule Take 1 capsule (60 mg) by mouth daily 90 capsule 3     EPINEPHrine (EPIPEN/ADRENACLICK/OR ANY BX GENERIC EQUIV) 0.3 MG/0.3ML injection 2-pack Inject 0.3 mLs (0.3 mg) into the muscle once as needed for anaphylaxis 0.6 mL 3     fexofenadine (ALLEGRA) 180 MG tablet Take 1 tablet (180 mg) by mouth daily 90 tablet 3     gabapentin (NEURONTIN) 300 MG capsule TAKE 1 TO 3 CAPSULES(300  MG) BY MOUTH THREE TIMES DAILY 270 capsule 0     lidocaine (XYLOCAINE) 2  "% solution Swish and swallow 15 mLs in mouth every 4 hours as needed for other (GERD) ; Max 8 doses/24 hour period. Mix with 15 mLs Maalox or Mylanta. 100 mL 3     losartan (COZAAR) 100 MG tablet Take 1 tablet (100 mg) by mouth daily 90 tablet 3     medical cannabis (Patient's own supply) (The purpose of this order is to document that the patient reports taking medical cannabis.  This is not a prescription, and is not used to certify that the patient has a qualifying medical condition.) 0 Information only 0     medroxyPROGESTERone (DEPO-PROVERA) 150 MG/ML IM injection Inject 1 mL (150 mg) into the muscle every 3 months 3 mL 3     methocarbamol (ROBAXIN) 750 MG tablet Take 1 tablet (750 mg) by mouth 3 times daily as needed for muscle spasms 60 tablet 3     metoprolol succinate ER (TOPROL-XL) 200 MG 24 hr tablet Take 1 tablet (200 mg) by mouth daily 90 tablet 1     NONFORMULARY Apply 30 mLs topically 4 times daily       ondansetron (ZOFRAN-ODT) 8 MG ODT tab Take 1 tablet (8 mg) by mouth every 8 hours as needed for nausea 45 tablet 3     oxyCODONE-acetaminophen (PERCOCET) 5-325 MG tablet Take 1 tablet by mouth       pantoprazole (PROTONIX) 40 MG EC tablet Take 1 tablet (40 mg) by mouth 2 times daily Take 30-60 minutes before a meal. 180 tablet 3     prazosin (MINIPRESS) 1 MG capsule Take 1 mg by mouth At Bedtime  5     prochlorperazine (COMPAZINE) 10 MG tablet Take 10 mg by mouth       rizatriptan (MAXALT-MLT) 5 MG ODT Take 1-2 tablets (5-10 mg) by mouth at onset of headache for migraine May repeat in 2 hours. Max 6 tablets/24 hours. 18 tablet 3     sucralfate (CARAFATE) 1 GM tablet Take 1 tablet (1 g) by mouth 4 times daily as needed (heartburn) 360 tablet 3     valACYclovir (VALTREX) 1000 mg tablet 2 tabs at onset of mouth sores.  Repeat dose in 12 hours.         Medications and history reviewed    Physical exam:  Vitals: /80   Temp 98.1  F (36.7  C) (Temporal)   Ht 1.562 m (5' 1.5\")   Wt 85.3 kg (188 lb)   " BMI 34.95 kg/m     BMI= Body mass index is 34.95 kg/m .    Constitutional: Healthy, alert, non-distressed   Head: Normo-cephalic, atraumatic, no lesions, masses or tenderness   Cardiovascular: RRR, no new murmurs, +S1, +S2 heart sounds, no clicks, rubs or gallops   Respiratory: CTAB, no rales, rhonchi or wheezing, equal chest rise, good respiratory effort   Gastrointestinal: Soft, non-tender, non distended, no rebound rigidity or guarding, no masses or hernias palpated   : Deferred  Rectal: incision open and very minimal drainage of blood. No purulence at this time, no additional fluctuance felt, no induration, ABDI without fulness and no purulence expressed with ABDI.  Musculoskeletal: Moves all extremities, normal  strength, no deformities noted   Skin: No suspicious lesions or rashes   Psychiatric: Mentation appears normal, affect appropriate   Hematologic/Lymphatic/Immunologic: Normal cervical and supraclavicular lymph nodes   Patient able to get up on table with mild difficulty.    Labs show:  No results found. However, due to the size of the patient record, not all encounters were searched. Please check Results Review for a complete set of results.    Imaging shows:  none    Assessment:     ICD-10-CM    1. Anal abscess K61.0      Plan: No further drainage necessary. Recommend completing antibiotics. Explained that the bloody drainage could persist up until closure of the incision which could take 1-2 weeks. She can proceed with her hysterectomy barring some complication of her healing.    Kirk Alamo DO      Again, thank you for allowing me to participate in the care of your patient.        Sincerely,        Kirk Alamo DO

## 2020-03-04 NOTE — PROGRESS NOTES
Patient seen in consultation for perianal abscess    HPI:  Patient is a 29 year old female who originally had made an appt with me for presumed hemorrhoids and anal pain actually visited the ED last night in [place] and was found to have a chris-anal abscess that was incised and drained. She was given a prescription of augmentin. She has plans for a hysterectomy later this month. She feels much better since the drainage happened last night. No continued fevers. Mild bloody drainage from the incision. Katy is deaf and an  is here to assist today. She does have the diagnosis of Crohn's disease.      Review Of Systems    Skin: negative  Ears/Nose/Throat: negative  Respiratory: No shortness of breath, dyspnea on exertion, cough, or hemoptysis  Cardiovascular: negative  Gastrointestinal: as above  Genitourinary: negative  Musculoskeletal: negative  Neurologic: negative  Hematologic/Lymphatic/Immunologic: negative  Endocrine: negative      Past Medical History:   Diagnosis Date     Abdominal pain 10/31- 11/4/2005    [facility] admit for [facility]     Allergic rhinitis, cause unspecified     Allergic rhinitis     Arthritis      C. difficile diarrhea     Past, no current diarrhea.     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, without mention of intractable epilepsy     pseudoseizures diagnosed after extensive neurologic eval     Migraine 07/21/12    D/C 07/22/12-Se[facility] Bondet     Migraine, unspecified, without mention of intractable migraine without  mention of status migrainosus     Migraine     Mild intermittent asthma     mild intermittent     Mycoplasma infection in conditions classified elsewhere and of unspecified site      Other chronic pain     Back pain for 6 years     Renal disease     Kidney stones     Unspecified hearing loss     congenital hearing loss       Past Surgical History:   Procedure Laterality Date     AS REMOVAL OF COCCYX  10/18/2017     C FUSION OF SACROILIAC JOINT  10/26/2018     COLONOSCOPY  7/1/2009    [facility], [place]     COLONOSCOPY N/A 7/17/2019    Procedure: Colonoscopy, With Polypectomy And Biopsy;  Surgeon: Edwin Conde MD;  Location: [facility]     COLONOSCOPY WITH CO2 INSUFFLATION N/A 7/17/2019    Procedure: COLONOSCOPY, WITH CO2 INSUFFLATION;  Surgeon: Edwin Conde MD;  Location: [facility]     COMBINED ESOPHAGOSCOPY, GASTROSCOPY, DUODENOSCOPY (EGD) WITH CO2 INSUFFLATION N/A 4/12/2019    Procedure: COMBINED ESOPHAGOSCOPY, GASTROSCOPY, DUODENOSCOPY (EGD) WITH CO2 INSUFFLATION;  Surgeon: Edwin Conde MD;  Location: [facility]     ESOPHAGOSCOPY, GASTROSCOPY, DUODENOSCOPY (EGD), COMBINED N/A 4/12/2019    Procedure: Combined Esophagoscopy, Gastroscopy, Duodenoscopy (Egd), Biopsy Single Or Multiple;  Surgeon: Edwin Conde MD;  Location: [facility]     FUSION SPINE POSTERIOR MINIMALLY INVASIVE ONE LEVEL N/A 2/23/2017    Procedure: FUSION SPINE POSTERIOR MINIMALLY INVASIVE ONE LEVEL;  L4-5 Oblique Lateral Lumbar Interbody Fusion   Epidural steroid injection.   Transpedicular Bone marrow aspiration;  Surgeon: Jeniffer Eugene MD;  Location: [facility] OR     HC COLONOSCOPY THRU STOMA WITH BIOPSY  10/29/2003    Impression is that of normal appearing colonoscopy, without evidence of rectal bleeding.     HC COLONOSCOPY THRU STOMA, DIAGNOSTIC  10/00    normal     HC COLONOSCOPY THRU STOMA, DIAGNOSTIC  Oct 2009    Dr López- normal     HC EEG AWAKE AND SLEEP      abnormal     HC MRI BRAIN W/O  CONTRAST  12/00    normal     HC REMOVAL GALLBLADDER  8/5/2009    [facility], [place]     HC UGI ENDOSCOPY DIAG W BIOPSY  11/11/09    Normal esophagus     HC UGI ENDOSCOPY, SIMPLE EXAM  7/00, 10/00    mild chronic esophagitis and duodenitis, neg H pylori     HC UGI ENDOSCOPY, SIMPLE EXAM  01/20/2005    Esophagogastroduodenoscopy, colonoscopy with biopsies.  [facility]     HC UGI ENDOSCOPY, SIMPLE EXAM  7/1/2009    [facility], [place]     HC UGI ENDOSCOPY, SIMPLE EXAM  11/11/2009    attempted upper GI, pt. could not tolerate procedure:[facility]     ORTHOPEDIC SURGERY  October 19,2011    diskectomy L4-L5       Family History   Problem Relation Age of Onset     Gastrointestinal Disease Brother         severe Crohn's     Neurologic Disorder Brother         Seizures post head injury     Depression Brother      Substance Abuse Brother      Genitourinary Problems Father         kidney stones     Diabetes Father      Heart Disease Father         Open heart surgery     Breast Cancer Maternal Grandmother      Parkinsonism Maternal Grandmother      Cerebrovascular Disease Paternal Grandmother      Cancer Maternal Grandfather         Lung     Cardiovascular Paternal Grandfather         Heart Attack     Substance Abuse Mother         in recovery x1 year      Lung Cancer Paternal Uncle      Cancer Paternal Uncle      Lung Cancer Maternal Uncle        Social History     Socioeconomic History     Marital status: Single     Spouse name: Not on file     Number of children: Not on file     Years of education: Not on file     Highest education level: Not on file   Occupational History     Not on file   Social Needs     Financial resource strain: Not on file     Food insecurity:     Worry: Not on file     Inability: Not on file     Transportation needs:     Medical: Not on file     Non-medical: Not on file   Tobacco Use     Smoking status: Current Every Day Smoker     Packs/day:  0.25     Years: 5.00     Pack years: 1.25     Types: Cigarettes     Smokeless tobacco: Never Used     Tobacco comment: due to illness    Substance and Sexual Activity     Alcohol use: Not Currently     Comment: Not since last July 2018     Drug use: Yes     Types: Marijuana     Comment: Medical Marijuana currently-- More CBD     Sexual activity: Not Currently     Partners: Male     Birth control/protection: Condom, Injection   Lifestyle     Physical activity:     Days per week: Not on file     Minutes per session: Not on file     Stress: Not on file   Relationships     Social connections:     Talks on phone: Not on file     Gets together: Not on file     Attends Buddhist service: Not on file     Active member of club or organization: Not on file     Attends meetings of clubs or organizations: Not on file     Relationship status: Not on file     Intimate partner violence:     Fear of current or ex partner: Not on file     Emotionally abused: Not on file     Physically abused: Not on file     Forced sexual activity: Not on file   Other Topics Concern      Service No     Blood Transfusions No     Caffeine Concern No     Occupational Exposure No     Hobby Hazards No     Sleep Concern No     Stress Concern No     Weight Concern No     Special Diet No     Back Care No     Exercise Yes     Bike Helmet No     Seat Belt Yes     Self-Exams Yes     Parent/sibling w/ CABG, MI or angioplasty before 65F 55M? Yes     Comment: father late 30's early 40's    Social History Narrative     Not on file       Current Outpatient Medications   Medication Sig Dispense Refill     acetaminophen (TYLENOL) 500 MG tablet Take 2 tablets (1,000 mg) by mouth 3 times daily as needed for mild pain 100 tablet 3     albuterol (PROAIR HFA/PROVENTIL HFA/VENTOLIN HFA) 108 (90 Base) MCG/ACT inhaler Inhale 2 puffs into the lungs every 6 hours as needed for shortness of breath / dyspnea or wheezing 3 Inhaler 3     albuterol (PROVENTIL) (2.5 MG/3ML)  0.083% neb solution Take 1 vial (2.5 mg) by nebulization every 6 hours as needed for shortness of breath / dyspnea or wheezing 50 vial 11     alum & mag hydroxide-simethicone (MAALOX ADVANCED MAX ST) 400-400-40 MG/5ML SUSP suspension Take 15 mLs by mouth every 4 hours as needed for indigestion (mix with lidocaine) 355 mL 11     ARIPiprazole (ABILIFY) 15 MG tablet Take 1 tablet (15 mg) by mouth At Bedtime 90 tablet 3     aspirin (ASA) 81 MG tablet Take 1 tablet (81 mg) by mouth daily 90 tablet 3     azelastine (OPTIVAR) 0.05 % ophthalmic solution Place 1 drop into both eyes 2 times daily 6 mL 11     benzonatate (TESSALON) 200 MG capsule Take 1 capsule (200 mg) by mouth 3 times daily as needed for cough 60 capsule 1     busPIRone (BUSPAR) 15 MG tablet Take 1 tablet (15 mg) by mouth 2 times daily 60 tablet 5     clindamycin (CLEOCIN) 150 MG capsule clindamycin HCl 150 mg capsule       cyclobenzaprine (FLEXERIL) 10 MG tablet Take 1 tablet (10 mg) by mouth 3 times daily as needed for muscle spasms or other (back pain) 90 tablet 3     diphenhydrAMINE (BENADRYL) 50 MG capsule Take 1-2 capsules ( mg) by mouth every 6 hours as needed for itching, allergies or sleep 90 capsule 3     DULoxetine (CYMBALTA) 60 MG EC capsule Take 1 capsule (60 mg) by mouth daily 90 capsule 3     EPINEPHrine (EPIPEN/ADRENACLICK/OR ANY BX GENERIC EQUIV) 0.3 MG/0.3ML injection 2-pack Inject 0.3 mLs (0.3 mg) into the muscle once as needed for anaphylaxis 0.6 mL 3     fexofenadine (ALLEGRA) 180 MG tablet Take 1 tablet (180 mg) by mouth daily 90 tablet 3     gabapentin (NEURONTIN) 300 MG capsule TAKE 1 TO 3 CAPSULES(300  MG) BY MOUTH THREE TIMES DAILY 270 capsule 0     lidocaine (XYLOCAINE) 2 % solution Swish and swallow 15 mLs in mouth every 4 hours as needed for other (GERD) ; Max 8 doses/24 hour period. Mix with 15 mLs Maalox or Mylanta. 100 mL 3     losartan (COZAAR) 100 MG tablet Take 1 tablet (100 mg) by mouth daily 90 tablet 3      "medical cannabis (Patient's own supply) (The purpose of this order is to document that the patient reports taking medical cannabis.  This is not a prescription, and is not used to certify that the patient has a qualifying medical condition.) 0 Information only 0     medroxyPROGESTERone (DEPO-PROVERA) 150 MG/ML IM injection Inject 1 mL (150 mg) into the muscle every 3 months 3 mL 3     methocarbamol (ROBAXIN) 750 MG tablet Take 1 tablet (750 mg) by mouth 3 times daily as needed for muscle spasms 60 tablet 3     metoprolol succinate ER (TOPROL-XL) 200 MG 24 hr tablet Take 1 tablet (200 mg) by mouth daily 90 tablet 1     NONFORMULARY Apply 30 mLs topically 4 times daily       ondansetron (ZOFRAN-ODT) 8 MG ODT tab Take 1 tablet (8 mg) by mouth every 8 hours as needed for nausea 45 tablet 3     oxyCODONE-acetaminophen (PERCOCET) 5-325 MG tablet Take 1 tablet by mouth       pantoprazole (PROTONIX) 40 MG EC tablet Take 1 tablet (40 mg) by mouth 2 times daily Take 30-60 minutes before a meal. 180 tablet 3     prazosin (MINIPRESS) 1 MG capsule Take 1 mg by mouth At Bedtime  5     prochlorperazine (COMPAZINE) 10 MG tablet Take 10 mg by mouth       rizatriptan (MAXALT-MLT) 5 MG ODT Take 1-2 tablets (5-10 mg) by mouth at onset of headache for migraine May repeat in 2 hours. Max 6 tablets/24 hours. 18 tablet 3     sucralfate (CARAFATE) 1 GM tablet Take 1 tablet (1 g) by mouth 4 times daily as needed (heartburn) 360 tablet 3     valACYclovir (VALTREX) 1000 mg tablet 2 tabs at onset of mouth sores.  Repeat dose in 12 hours.         Medications and history reviewed    Physical exam:  Vitals: /80   Temp 98.1  F (36.7  C) (Temporal)   Ht 1.562 m (5' 1.5\")   Wt 85.3 kg (188 lb)   BMI 34.95 kg/m    BMI= Body mass index is 34.95 kg/m .    Constitutional: Healthy, alert, non-distressed   Head: Normo-cephalic, atraumatic, no lesions, masses or tenderness   Cardiovascular: RRR, no new murmurs, +S1, +S2 heart sounds, no clicks, " rubs or gallops   Respiratory: CTAB, no rales, rhonchi or wheezing, equal chest rise, good respiratory effort   Gastrointestinal: Soft, non-tender, non distended, no rebound rigidity or guarding, no masses or hernias palpated   : Deferred  Rectal: incision open and very minimal drainage of blood. No purulence at this time, no additional fluctuance felt, no induration, ABDI without fulness and no purulence expressed with ABDI.  Musculoskeletal: Moves all extremities, normal  strength, no deformities noted   Skin: No suspicious lesions or rashes   Psychiatric: Mentation appears normal, affect appropriate   Hematologic/Lymphatic/Immunologic: Normal cervical and supraclavicular lymph nodes   Patient able to get up on table with mild difficulty.    Labs show:  No results found. However, due to the size of the patient record, not all encounters were searched. Please check Results Review for a complete set of results.    Imaging shows:  none    Assessment:     ICD-10-CM    1. Anal abscess K61.0      Plan: No further drainage necessary. Recommend completing antibiotics. Explained that the bloody drainage could persist up until closure of the incision which could take 1-2 weeks. She can proceed with her hysterectomy barring some complication of her healing.    Kirk Alamo, DO

## 2020-03-06 ENCOUNTER — OFFICE VISIT (OUTPATIENT)
Dept: SURGERY | Facility: CLINIC | Age: 30
End: 2020-03-06
Payer: MEDICARE

## 2020-03-06 VITALS
DIASTOLIC BLOOD PRESSURE: 82 MMHG | BODY MASS INDEX: 34.6 KG/M2 | SYSTOLIC BLOOD PRESSURE: 120 MMHG | WEIGHT: 188 LBS | HEIGHT: 62 IN | TEMPERATURE: 98.4 F | HEART RATE: 86 BPM

## 2020-03-06 DIAGNOSIS — K61.0 ANAL ABSCESS: Primary | ICD-10-CM

## 2020-03-06 PROCEDURE — 99213 OFFICE O/P EST LOW 20 MIN: CPT | Performed by: SURGERY

## 2020-03-06 ASSESSMENT — MIFFLIN-ST. JEOR: SCORE: 1518.07

## 2020-03-06 NOTE — LETTER
3/6/2020         RE: Katy Islas  [address]  [place] [address]        Dear Colleague,    Thank you for referring your patient, Katy Islas, to the [facility]. Please see a copy of my visit note below.    General Surgery Follow Up    Pt returns for follow up visit for perianal abscess s/p Incision and drainge    HPI:  Katy returns because she was having some unexpected pain in the anal area and a small amount of reported purulent drainage. She denies fevers, chills. She did have some reflux, nausea and diarrhea since she started the augmentin. No other new symptoms or concerns. She does have Crohn's disease and is concerned this could be a fistula.    Review Of Systems    Skin: negative  Ears/Nose/Throat: negative  Respiratory: No shortness of breath, dyspnea on exertion, cough, or hemoptysis  Cardiovascular: negative  Gastrointestinal: as above  Genitourinary: negative  Musculoskeletal: negative  Neurologic: negative  Hematologic/Lymphatic/Immunologic: negative  Endocrine: negative      Past Medical History:   Diagnosis Date     Abdominal pain 10/31- 11/4/2005    [facility] admit for Crohn's     Allergic rhinitis, cause unspecified     Allergic rhinitis     Arthritis      C. difficile diarrhea     Past, no current diarrhea.     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, without mention of intractable epilepsy     pseudoseizures diagnosed after extensive neurologic eval     Migraine 07/21/12    D/C 07/22/12-Se[facility]  [facility]     Migraine, unspecified, without mention of intractable migraine without mention of status migrainosus     Migraine     Mild intermittent asthma     mild intermittent     Mycoplasma infection in conditions classified elsewhere and of unspecified site      Other chronic pain     Back pain for 6 years     Renal disease     Kidney stones     Unspecified hearing loss     congenital hearing loss       Past Surgical History:   Procedure Laterality Date     AS REMOVAL OF COCCYX  10/18/2017     C FUSION OF SACROILIAC JOINT  10/26/2018     COLONOSCOPY  7/1/2009    [facility].     COLONOSCOPY N/A 7/17/2019    Procedure: Colonoscopy, With Polypectomy And Biopsy;  Surgeon: Edwin Conde MD;  Location: [facility] OR     COLONOSCOPY WITH CO2 INSUFFLATION N/A 7/17/2019    Procedure: COLONOSCOPY, WITH CO2 INSUFFLATION;  Surgeon: Edwin Conde MD;  Location: [facility]     COMBINED ESOPHAGOSCOPY, GASTROSCOPY, DUODENOSCOPY (EGD) WITH CO2 INSUFFLATION N/A 4/12/2019    Procedure: COMBINED ESOPHAGOSCOPY, GASTROSCOPY, DUODENOSCOPY (EGD) WITH CO2 INSUFFLATION;  Surgeon: Edwin Conde MD;  Location: [facility] OR     ESOPHAGOSCOPY, GASTROSCOPY, DUODENOSCOPY (EGD), COMBINED N/A 4/12/2019    Procedure: Combined Esophagoscopy, Gastroscopy, Duodenoscopy (Egd), Biopsy Single Or Multiple;  Surgeon: Edwin Conde MD;  Location: [facility] OR     FUSION SPINE POSTERIOR MINIMALLY INVASIVE ONE LEVEL N/A 2/23/2017    Procedure: FUSION SPINE POSTERIOR MINIMALLY INVASIVE ONE LEVEL;  L4-5 Oblique Lateral Lumbar Interbody Fusion   Epidural steroid injection.   Transpedicular Bone marrow aspiration;  Surgeon: Jeniffer Eugene MD;  Location: [facility] OR     HC COLONOSCOPY THRU STOMA WITH BIOPSY  10/29/2003    Impression is that of normal appearing colonoscopy, without evidence of rectal bleeding.     HC COLONOSCOPY THRU STOMA, DIAGNOSTIC  10/00    normal     HC COLONOSCOPY THRU STOMA, DIAGNOSTIC  Oct 2009     Dr López- normal     HC EEG AWAKE AND SLEEP      abnormal     HC MRI BRAIN W/O CONTRAST  12/00    normal     HC REMOVAL GALLBLADDER  8/5/2009    [facility].     HC UGI ENDOSCOPY DIAG W BIOPSY  11/11/09    Normal esophagus     HC UGI ENDOSCOPY, SIMPLE EXAM  7/00, 10/00    mild chronic esophagitis and duodenitis, neg H pylori     HC UGI ENDOSCOPY, SIMPLE EXAM  01/20/2005    Esophagogastroduodenoscopy, colonoscopy with biopsies.  [facility]     HC UGI ENDOSCOPY, SIMPLE EXAM  7/1/2009    [facility], [facility].      UGI ENDOSCOPY, SIMPLE EXAM  11/11/2009    attempted upper GI, pt. could not tolerate procedure:[facility]     ORTHOPEDIC SURGERY  October 19,2011    diskectomy L4-L5       Social History     Socioeconomic History     Marital status: Single     Spouse name: Not on file     Number of children: Not on file     Years of education: Not on file     Highest education level: Not on file   Occupational History     Not on file   Social Needs     Financial resource strain: Not on file     Food insecurity:     Worry: Not on file     Inability: Not on file     Transportation needs:     Medical: Not on file     Non-medical: Not on file   Tobacco Use     Smoking status: Current Every Day Smoker     Packs/day: 0.25     Years: 5.00     Pack years: 1.25     Types: Cigarettes     Smokeless tobacco: Never Used     Tobacco comment: due to illness    Substance and Sexual Activity     Alcohol use: Not Currently     Comment: Not since last July 2018     Drug use: Yes     Types: Marijuana     Comment: Medical Marijuana currently-- More CBD     Sexual activity: Not Currently     Partners: Male     Birth control/protection: Condom, Injection   Lifestyle     Physical activity:     Days per week: Not on file     Minutes per session: Not on file     Stress: Not on file   Relationships     Social connections:     Talks on phone: Not on file     Gets together: Not on file      Attends [employer] service: Not on file     Active member of club or organization: Not on file     Attends meetings of clubs or organizations: Not on file     Relationship status: Not on file     Intimate partner violence:     Fear of current or ex partner: Not on file     Emotionally abused: Not on file     Physically abused: Not on file     Forced sexual activity: Not on file   Other Topics Concern      Service No     Blood Transfusions No     Caffeine Concern No     Occupational Exposure No     Hobby Hazards No     Sleep Concern No     Stress Concern No     Weight Concern No     Special Diet No     Back Care No     Exercise Yes     Bike Helmet No     Seat Belt Yes     Self-Exams Yes     Parent/sibling w/ CABG, MI or angioplasty before 65F 55M? Yes     Comment: father late 30's early 40's    Social History Narrative     Not on file       Current Outpatient Medications   Medication Sig Dispense Refill     acetaminophen (TYLENOL) 500 MG tablet Take 2 tablets (1,000 mg) by mouth 3 times daily as needed for mild pain 100 tablet 3     albuterol (PROAIR HFA/PROVENTIL HFA/VENTOLIN HFA) 108 (90 Base) MCG/ACT inhaler Inhale 2 puffs into the lungs every 6 hours as needed for shortness of breath / dyspnea or wheezing 3 Inhaler 3     albuterol (PROVENTIL) (2.5 MG/3ML) 0.083% neb solution Take 1 vial (2.5 mg) by nebulization every 6 hours as needed for shortness of breath / dyspnea or wheezing 50 vial 11     alum & mag hydroxide-simethicone (MAALOX ADVANCED MAX ST) 400-400-40 MG/5ML SUSP suspension Take 15 mLs by mouth every 4 hours as needed for indigestion (mix with lidocaine) 355 mL 11     ARIPiprazole (ABILIFY) 15 MG tablet Take 1 tablet (15 mg) by mouth At Bedtime 90 tablet 3     aspirin (ASA) 81 MG tablet Take 1 tablet (81 mg) by mouth daily 90 tablet 3     azelastine (OPTIVAR) 0.05 % ophthalmic solution Place 1 drop into both eyes 2 times daily 6 mL 11     benzonatate (TESSALON) 200 MG capsule Take 1 capsule (200  mg) by mouth 3 times daily as needed for cough 60 capsule 1     busPIRone (BUSPAR) 15 MG tablet Take 1 tablet (15 mg) by mouth 2 times daily 60 tablet 5     clindamycin (CLEOCIN) 150 MG capsule clindamycin HCl 150 mg capsule       cyclobenzaprine (FLEXERIL) 10 MG tablet Take 1 tablet (10 mg) by mouth 3 times daily as needed for muscle spasms or other (back pain) 90 tablet 3     diphenhydrAMINE (BENADRYL) 50 MG capsule Take 1-2 capsules ( mg) by mouth every 6 hours as needed for itching, allergies or sleep 90 capsule 3     DULoxetine (CYMBALTA) 60 MG EC capsule Take 1 capsule (60 mg) by mouth daily 90 capsule 3     EPINEPHrine (EPIPEN/ADRENACLICK/OR ANY BX GENERIC EQUIV) 0.3 MG/0.3ML injection 2-pack Inject 0.3 mLs (0.3 mg) into the muscle once as needed for anaphylaxis 0.6 mL 3     fexofenadine (ALLEGRA) 180 MG tablet Take 1 tablet (180 mg) by mouth daily 90 tablet 3     gabapentin (NEURONTIN) 300 MG capsule TAKE 1 TO 3 CAPSULES(300  MG) BY MOUTH THREE TIMES DAILY 270 capsule 0     lidocaine (XYLOCAINE) 2 % solution Swish and swallow 15 mLs in mouth every 4 hours as needed for other (GERD) ; Max 8 doses/24 hour period. Mix with 15 mLs Maalox or Mylanta. 100 mL 3     losartan (COZAAR) 100 MG tablet Take 1 tablet (100 mg) by mouth daily 90 tablet 3     medical cannabis (Patient's own supply) (The purpose of this order is to document that the patient reports taking medical cannabis.  This is not a prescription, and is not used to certify that the patient has a qualifying medical condition.) 0 Information only 0     medroxyPROGESTERone (DEPO-PROVERA) 150 MG/ML IM injection Inject 1 mL (150 mg) into the muscle every 3 months 3 mL 3     methocarbamol (ROBAXIN) 750 MG tablet Take 1 tablet (750 mg) by mouth 3 times daily as needed for muscle spasms 60 tablet 3     metoprolol succinate ER (TOPROL-XL) 200 MG 24 hr tablet Take 1 tablet (200 mg) by mouth daily 90 tablet 1     NONFORMULARY Apply 30 mLs topically 4  "times daily       ondansetron (ZOFRAN-ODT) 8 MG ODT tab Take 1 tablet (8 mg) by mouth every 8 hours as needed for nausea 45 tablet 3     oxyCODONE-acetaminophen (PERCOCET) 5-325 MG tablet Take 1 tablet by mouth       pantoprazole (PROTONIX) 40 MG EC tablet Take 1 tablet (40 mg) by mouth 2 times daily Take 30-60 minutes before a meal. 180 tablet 3     prazosin (MINIPRESS) 1 MG capsule Take 1 mg by mouth At Bedtime  5     prochlorperazine (COMPAZINE) 10 MG tablet Take 10 mg by mouth       rizatriptan (MAXALT-MLT) 5 MG ODT Take 1-2 tablets (5-10 mg) by mouth at onset of headache for migraine May repeat in 2 hours. Max 6 tablets/24 hours. 18 tablet 3     sucralfate (CARAFATE) 1 GM tablet Take 1 tablet (1 g) by mouth 4 times daily as needed (heartburn) 360 tablet 3     valACYclovir (VALTREX) 1000 mg tablet 2 tabs at onset of mouth sores.  Repeat dose in 12 hours.         Medications and history reviewed    Physical exam:  Vitals: /82   Pulse 86   Temp 98.4  F (36.9  C) (Temporal)   Ht 1.562 m (5' 1.5\")   Wt 85.3 kg (188 lb)   BMI 34.95 kg/m     BMI= Body mass index is 34.95 kg/m .    Constitutional: Healthy, alert, non-distressed   Head: Normo-cephalic, atraumatic, no lesions, masses or tenderness   Cardiovascular: RRR, no new murmurs, +S1, +S2 heart sounds, no clicks, rubs or gallops   Respiratory: CTAB, no rales, rhonchi or wheezing, equal chest rise, good respiratory effort   Gastrointestinal: Soft, non-tender, non distended, no rebound rigidity or guarding, no masses or hernias palpated   : Deferred  Anal exam: no erythema, induration, cellulitis, fluctuance. Normal healing incision from previous drainage. No purulence. Tiny soft purple internal hemorrhoid without signs of bleeding.  Musculoskeletal: Moves all extremities, normal  strength, no deformities noted   Skin: No suspicious lesions or rashes   Psychiatric: Mentation appears normal, affect appropriate   Hematologic/Lymphatic/Immunologic: " Normal cervical and supraclavicular lymph nodes   Patient able to get up on table without difficulty.      Labs show:  none    Imaging shows:  none    Assessment:     ICD-10-CM    1. Anal abscess K61.0      Plan: Everything looks like it should. She is at increased risk of developing an anal fistula due to her Crohn's disease but at this point it would be too early to say this isn't a simple abscess. If this should recur or there are continued wound healing issues she should follow up with her gastroenterologist and potentially a colorectal surgeon for EUA/evaluation for fistula.    Kirk Alamo, DO      Again, thank you for allowing me to participate in the care of your patient.        Sincerely,        Kirk Alamo, DO

## 2020-03-06 NOTE — PROGRESS NOTES
General Surgery Follow Up    Pt returns for follow up visit for perianal abscess s/p Incision and drainge    HPI:  Katy returns because she was having some unexpected pain in the anal area and a small amount of reported purulent drainage. She denies fevers, chills. She did have some reflux, nausea and diarrhea since she started the augmentin. No other new symptoms or concerns. She does have Crohn's disease and is concerned this could be a fistula.    Review Of Systems    Skin: negative  Ears/Nose/Throat: negative  Respiratory: No shortness of breath, dyspnea on exertion, cough, or hemoptysis  Cardiovascular: negative  Gastrointestinal: as above  Genitourinary: negative  Musculoskeletal: negative  Neurologic: negative  Hematologic/Lymphatic/Immunologic: negative  Endocrine: negative      Past Medical History:   Diagnosis Date     Abdominal pain 10/31- 11/4/2005    [facility] admit for Crohn's     Allergic rhinitis, cause unspecified     Allergic rhinitis     Arthritis      C. difficile diarrhea     Past, no current diarrhea.     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, without mention of intractable epilepsy     pseudoseizures diagnosed after extensive neurologic eval     Migraine 07/21/12    D/C 07/22/12-[facility]     Migraine, unspecified, without mention of intractable migraine without mention of status migrainosus     Migraine     Mild intermittent asthma     mild intermittent     Mycoplasma infection in conditions classified elsewhere and of  unspecified site      Other chronic pain     Back pain for 6 years     Renal disease     Kidney stones     Unspecified hearing loss     congenital hearing loss       Past Surgical History:   Procedure Laterality Date     AS REMOVAL OF COCCYX  10/18/2017     C FUSION OF SACROILIAC JOINT  10/26/2018     COLONOSCOPY  7/1/2009    [facility].     COLONOSCOPY N/A 7/17/2019    Procedure: Colonoscopy, With Polypectomy And Biopsy;  Surgeon: Edwin Conde MD;  Location: [facility] OR     COLONOSCOPY WITH CO2 INSUFFLATION N/A 7/17/2019    Procedure: COLONOSCOPY, WITH CO2 INSUFFLATION;  Surgeon: Edwin Conde MD;  Location: [facility] OR     COMBINED ESOPHAGOSCOPY, GASTROSCOPY, DUODENOSCOPY (EGD) WITH CO2 INSUFFLATION N/A 4/12/2019    Procedure: COMBINED ESOPHAGOSCOPY, GASTROSCOPY, DUODENOSCOPY (EGD) WITH CO2 INSUFFLATION;  Surgeon: Edwin Conde MD;  Location: [facility] OR     ESOPHAGOSCOPY, GASTROSCOPY, DUODENOSCOPY (EGD), COMBINED N/A 4/12/2019    Procedure: Combined Esophagoscopy, Gastroscopy, Duodenoscopy (Egd), Biopsy Single Or Multiple;  Surgeon: Edwin Conde MD;  Location: [facility] OR     FUSION SPINE POSTERIOR MINIMALLY INVASIVE ONE LEVEL N/A 2/23/2017    Procedure: FUSION SPINE POSTERIOR MINIMALLY INVASIVE ONE LEVEL;  L4-5 Oblique Lateral Lumbar Interbody Fusion   Epidural steroid injection.   Transpedicular Bone marrow aspiration;  Surgeon: Jeniffer Eugene MD;  Location: [facility] OR     HC COLONOSCOPY THRU STOMA WITH BIOPSY  10/29/2003    Impression is that of normal appearing colonoscopy, without evidence of rectal bleeding.     HC COLONOSCOPY THRU STOMA, DIAGNOSTIC  10/00    normal     HC COLONOSCOPY THRU STOMA, DIAGNOSTIC  Oct 2009    Dr López- normal     HC EEG AWAKE AND SLEEP      abnormal     HC MRI BRAIN W/O CONTRAST  12/00    normal     HC REMOVAL GALLBLADDER  8/5/2009    [facility].     HC UGI ENDOSCOPY DIAG W BIOPSY  11/11/09    Normal  esophagus     HC UGI ENDOSCOPY, SIMPLE EXAM  7/00, 10/00    mild chronic esophagitis and duodenitis, neg H pylori     HC UGI ENDOSCOPY, SIMPLE EXAM  01/20/2005    Esophagogastroduodenoscopy, colonoscopy with biopsies.  [facility]     HC UGI ENDOSCOPY, SIMPLE EXAM  7/1/2009    [facility] [place]     HC UGI ENDOSCOPY, SIMPLE EXAM  11/11/2009    attempted upper GI, pt. could not tolerate procedure:[facility]     ORTHOPEDIC SURGERY  October 19,2011    diskectomy L4-L5       Social History     Socioeconomic History     Marital status: Single     Spouse name: Not on file     Number of children: Not on file     Years of education: Not on file     Highest education level: Not on file   Occupational History     Not on file   Social Needs     Financial resource strain: Not on file     Food insecurity:     Worry: Not on file     Inability: Not on file     Transportation needs:     Medical: Not on file     Non-medical: Not on file   Tobacco Use     Smoking status: Current Every Day Smoker     Packs/day: 0.25     Years: 5.00     Pack years: 1.25     Types: Cigarettes     Smokeless tobacco: Never Used     Tobacco comment: due to illness    Substance and Sexual Activity     Alcohol use: Not Currently     Comment: Not since last July 2018     Drug use: Yes     Types: Marijuana     Comment: Medical Marijuana currently-- More CBD     Sexual activity: Not Currently     Partners: Male     Birth control/protection: Condom, Injection   Lifestyle     Physical activity:     Days per week: Not on file     Minutes per session: Not on file     Stress: Not on file   Relationships     Social connections:     Talks on phone: Not on file     Gets together: Not on file     Attends Temple service: Not on file     Active member of club or organization: Not on file     Attends meetings of clubs or organizations: Not on file     Relationship status: Not on file     Intimate partner violence:     Fear of  current or ex partner: Not on file     Emotionally abused: Not on file     Physically abused: Not on file     Forced sexual activity: Not on file   Other Topics Concern      Service No     Blood Transfusions No     Caffeine Concern No     Occupational Exposure No     Hobby Hazards No     Sleep Concern No     Stress Concern No     Weight Concern No     Special Diet No     Back Care No     Exercise Yes     Bike Helmet No     Seat Belt Yes     Self-Exams Yes     Parent/sibling w/ CABG, MI or angioplasty before 65F 55M? Yes     Comment: father late 30's early 40's    Social History Narrative     Not on file       Current Outpatient Medications   Medication Sig Dispense Refill     acetaminophen (TYLENOL) 500 MG tablet Take 2 tablets (1,000 mg) by mouth 3 times daily as needed for mild pain 100 tablet 3     albuterol (PROAIR HFA/PROVENTIL HFA/VENTOLIN HFA) 108 (90 Base) MCG/ACT inhaler Inhale 2 puffs into the lungs every 6 hours as needed for shortness of breath / dyspnea or wheezing 3 Inhaler 3     albuterol (PROVENTIL) (2.5 MG/3ML) 0.083% neb solution Take 1 vial (2.5 mg) by nebulization every 6 hours as needed for shortness of breath / dyspnea or wheezing 50 vial 11     alum & mag hydroxide-simethicone (MAALOX ADVANCED MAX ST) 400-400-40 MG/5ML SUSP suspension Take 15 mLs by mouth every 4 hours as needed for indigestion (mix with lidocaine) 355 mL 11     ARIPiprazole (ABILIFY) 15 MG tablet Take 1 tablet (15 mg) by mouth At Bedtime 90 tablet 3     aspirin (ASA) 81 MG tablet Take 1 tablet (81 mg) by mouth daily 90 tablet 3     azelastine (OPTIVAR) 0.05 % ophthalmic solution Place 1 drop into both eyes 2 times daily 6 mL 11     benzonatate (TESSALON) 200 MG capsule Take 1 capsule (200 mg) by mouth 3 times daily as needed for cough 60 capsule 1     busPIRone (BUSPAR) 15 MG tablet Take 1 tablet (15 mg) by mouth 2 times daily 60 tablet 5     clindamycin (CLEOCIN) 150 MG capsule clindamycin HCl 150 mg capsule        cyclobenzaprine (FLEXERIL) 10 MG tablet Take 1 tablet (10 mg) by mouth 3 times daily as needed for muscle spasms or other (back pain) 90 tablet 3     diphenhydrAMINE (BENADRYL) 50 MG capsule Take 1-2 capsules ( mg) by mouth every 6 hours as needed for itching, allergies or sleep 90 capsule 3     DULoxetine (CYMBALTA) 60 MG EC capsule Take 1 capsule (60 mg) by mouth daily 90 capsule 3     EPINEPHrine (EPIPEN/ADRENACLICK/OR ANY BX GENERIC EQUIV) 0.3 MG/0.3ML injection 2-pack Inject 0.3 mLs (0.3 mg) into the muscle once as needed for anaphylaxis 0.6 mL 3     fexofenadine (ALLEGRA) 180 MG tablet Take 1 tablet (180 mg) by mouth daily 90 tablet 3     gabapentin (NEURONTIN) 300 MG capsule TAKE 1 TO 3 CAPSULES(300  MG) BY MOUTH THREE TIMES DAILY 270 capsule 0     lidocaine (XYLOCAINE) 2 % solution Swish and swallow 15 mLs in mouth every 4 hours as needed for other (GERD) ; Max 8 doses/24 hour period. Mix with 15 mLs Maalox or Mylanta. 100 mL 3     losartan (COZAAR) 100 MG tablet Take 1 tablet (100 mg) by mouth daily 90 tablet 3     medical cannabis (Patient's own supply) (The purpose of this order is to document that the patient reports taking medical cannabis.  This is not a prescription, and is not used to certify that the patient has a qualifying medical condition.) 0 Information only 0     medroxyPROGESTERone (DEPO-PROVERA) 150 MG/ML IM injection Inject 1 mL (150 mg) into the muscle every 3 months 3 mL 3     methocarbamol (ROBAXIN) 750 MG tablet Take 1 tablet (750 mg) by mouth 3 times daily as needed for muscle spasms 60 tablet 3     metoprolol succinate ER (TOPROL-XL) 200 MG 24 hr tablet Take 1 tablet (200 mg) by mouth daily 90 tablet 1     NONFORMULARY Apply 30 mLs topically 4 times daily       ondansetron (ZOFRAN-ODT) 8 MG ODT tab Take 1 tablet (8 mg) by mouth every 8 hours as needed for nausea 45 tablet 3     oxyCODONE-acetaminophen (PERCOCET) 5-325 MG tablet Take 1 tablet by mouth       pantoprazole  "(PROTONIX) 40 MG EC tablet Take 1 tablet (40 mg) by mouth 2 times daily Take 30-60 minutes before a meal. 180 tablet 3     prazosin (MINIPRESS) 1 MG capsule Take 1 mg by mouth At Bedtime  5     prochlorperazine (COMPAZINE) 10 MG tablet Take 10 mg by mouth       rizatriptan (MAXALT-MLT) 5 MG ODT Take 1-2 tablets (5-10 mg) by mouth at onset of headache for migraine May repeat in 2 hours. Max 6 tablets/24 hours. 18 tablet 3     sucralfate (CARAFATE) 1 GM tablet Take 1 tablet (1 g) by mouth 4 times daily as needed (heartburn) 360 tablet 3     valACYclovir (VALTREX) 1000 mg tablet 2 tabs at onset of mouth sores.  Repeat dose in 12 hours.         Medications and history reviewed    Physical exam:  Vitals: /82   Pulse 86   Temp 98.4  F (36.9  C) (Temporal)   Ht 1.562 m (5' 1.5\")   Wt 85.3 kg (188 lb)   BMI 34.95 kg/m    BMI= Body mass index is 34.95 kg/m .    Constitutional: Healthy, alert, non-distressed   Head: Normo-cephalic, atraumatic, no lesions, masses or tenderness   Cardiovascular: RRR, no new murmurs, +S1, +S2 heart sounds, no clicks, rubs or gallops   Respiratory: CTAB, no rales, rhonchi or wheezing, equal chest rise, good respiratory effort   Gastrointestinal: Soft, non-tender, non distended, no rebound rigidity or guarding, no masses or hernias palpated   : Deferred  Anal exam: no erythema, induration, cellulitis, fluctuance. Normal healing incision from previous drainage. No purulence. Tiny soft purple internal hemorrhoid without signs of bleeding.  Musculoskeletal: Moves all extremities, normal  strength, no deformities noted   Skin: No suspicious lesions or rashes   Psychiatric: Mentation appears normal, affect appropriate   Hematologic/Lymphatic/Immunologic: Normal cervical and supraclavicular lymph nodes   Patient able to get up on table without difficulty.      Labs show:  none    Imaging shows:  none    Assessment:     ICD-10-CM    1. Anal abscess K61.0      Plan: Everything looks like " it should. She is at increased risk of developing an anal fistula due to her Crohn's disease but at this point it would be too early to say this isn't a simple abscess. If this should recur or there are continued wound healing issues she should follow up with her gastroenterologist and potentially a colorectal surgeon for EUA/evaluation for fistula.    Kirk Alamo, DO

## 2020-03-08 ENCOUNTER — MYC MEDICAL ADVICE (OUTPATIENT)
Dept: FAMILY MEDICINE | Facility: OTHER | Age: 30
End: 2020-03-08

## 2020-03-09 ENCOUNTER — MYC MEDICAL ADVICE (OUTPATIENT)
Dept: GASTROENTEROLOGY | Facility: CLINIC | Age: 30
End: 2020-03-09

## 2020-03-09 NOTE — TELEPHONE ENCOUNTER
Patient called clinic asking for CDL to address this message, she wants to know if she should postpone her surgery.

## 2020-03-09 NOTE — TELEPHONE ENCOUNTER
Polytrim ear drops - should use 1 week   Will be fine to have surgery as it is still 2 weeks out. As long as she completes medications there should be no issue.     Zach Rosario PA-C  [facility]

## 2020-03-10 ENCOUNTER — OFFICE VISIT (OUTPATIENT)
Dept: FAMILY MEDICINE | Facility: OTHER | Age: 30
End: 2020-03-10
Payer: MEDICARE

## 2020-03-10 VITALS
WEIGHT: 190 LBS | TEMPERATURE: 97.3 F | RESPIRATION RATE: 14 BRPM | BODY MASS INDEX: 34.96 KG/M2 | SYSTOLIC BLOOD PRESSURE: 122 MMHG | HEART RATE: 116 BPM | HEIGHT: 62 IN | OXYGEN SATURATION: 97 % | DIASTOLIC BLOOD PRESSURE: 80 MMHG

## 2020-03-10 DIAGNOSIS — F41.9 ANXIETY: ICD-10-CM

## 2020-03-10 DIAGNOSIS — F31.63 BIPOLAR DISORDER, CURRENT EPISODE MIXED, SEVERE, WITHOUT PSYCHOTIC FEATURES (H): ICD-10-CM

## 2020-03-10 DIAGNOSIS — K50.118 CROHN'S DISEASE OF COLON WITH OTHER COMPLICATION (H): ICD-10-CM

## 2020-03-10 DIAGNOSIS — F43.23 ADJUSTMENT DISORDER WITH MIXED ANXIETY AND DEPRESSED MOOD: ICD-10-CM

## 2020-03-10 DIAGNOSIS — K60.2 ANAL FISSURE: Primary | ICD-10-CM

## 2020-03-10 DIAGNOSIS — Z91.030 BEE STING ALLERGY: ICD-10-CM

## 2020-03-10 DIAGNOSIS — E66.01 MORBID OBESITY (H): ICD-10-CM

## 2020-03-10 DIAGNOSIS — K61.2 ABSCESS OF ANAL AND RECTAL REGIONS: ICD-10-CM

## 2020-03-10 PROCEDURE — 99214 OFFICE O/P EST MOD 30 MIN: CPT | Performed by: PHYSICIAN ASSISTANT

## 2020-03-10 PROCEDURE — T1013 SIGN LANG/ORAL INTERPRETER: HCPCS | Mod: U3 | Performed by: PHYSICIAN ASSISTANT

## 2020-03-10 RX ORDER — CLONAZEPAM 0.5 MG/1
0.5 TABLET, ORALLY DISINTEGRATING ORAL
Qty: 14 TABLET | Refills: 0 | Status: SHIPPED | OUTPATIENT
Start: 2020-03-10 | End: 2020-03-23

## 2020-03-10 RX ORDER — EPINEPHRINE 0.3 MG/.3ML
0.3 INJECTION SUBCUTANEOUS
Qty: 0.6 ML | Refills: 3 | Status: SHIPPED | OUTPATIENT
Start: 2020-03-10 | End: 2021-05-19

## 2020-03-10 RX ORDER — NEOMYCIN SULFATE, POLYMYXIN B SULFATE AND HYDROCORTISONE 10; 3.5; 1 MG/ML; MG/ML; [USP'U]/ML
1 SUSPENSION/ DROPS AURICULAR (OTIC)
COMMUNITY
Start: 2020-03-08 | End: 2020-04-11

## 2020-03-10 ASSESSMENT — MIFFLIN-ST. JEOR: SCORE: 1527.14

## 2020-03-10 ASSESSMENT — PAIN SCALES - GENERAL: PAINLEVEL: MODERATE PAIN (4)

## 2020-03-10 NOTE — PATIENT INSTRUCTIONS
March 17th 8:40 am     Dr Stratton [facility]   [address] [address], [address]  Phone: [phone]

## 2020-03-10 NOTE — PROGRESS NOTES
Subjective     Katy Islas is a 29 year old female who presents to clinic today for the following health issues:    HPI     ED/UC Followup:    Facility:  [facility]   Date of visit: 3/9/2020  Reason for visit: Ear, nausea and diarrhea   Current Status: Same     - needs to change meds for stomach   - needs to see colorectal (referral in)   - needs to check ear    - Today not nauseated, yesterday didn't feel well at all  - Today just tired, crabby, anxious, fed up with being sick all the time    - [place] and [place] in July     - Counseling every Tuesday, psychiatry - saw first eval last week      Told no changes until after surgery      Dr. Renata Lucas MD [facility] - hearing but signs   - [facility] counseling - she is deaf   - Vistaril didn't help   - No pain medications          Plan from ED visit yesterday   Katy Islas is a 29 y.o. female who presents for evaluation of low grade temperature, abdominal discomfort, and diarrhea. She is on antibiotics for a rectal abscess. I think that her symptoms are most consistent with antibiotic associated diarrhea. I examined her rectal abscess and it appears to be healing well. She did manage to get a little drainage out of it today. It is possible that this could be a fistula but that is a non emergent condition that can be worked up by her gastroenterologist. She has no signs of toxicity. Her abdominal examination is reassuring and benign. I suggested that she could have some colitis from the Augmentin and again this could be discussed with her gastroenterologist. We did attempt to collect a stool specimen and she will try to collect this more at home and bring it in for testing for C difficile colitis, but she does not have a toxic appearance to suggest that this is the likely etiology. She will continue on with out-patient with her gastroenterologist and return if she has severe worsening of her symptoms.         Review of Systems   ROS COMP:  "Constitutional, HEENT, cardiovascular, pulmonary, gi and gu systems are negative, except as otherwise noted.      Objective    /80   Pulse 116   Temp 97.3  F (36.3  C) (Temporal)   Resp 14   Ht 1.562 m (5' 1.5\")   Wt 86.2 kg (190 lb)   SpO2 97%   BMI 35.32 kg/m    Body mass index is 35.32 kg/m .  Physical Exam   GENERAL APPEARANCE: healthy appearing, alert and no distress  EYES: Eyes grossly normal to inspection, PERRLA, conjunctivae and sclerae without injection or discharge, EOM intact   HENT: Left ear canal without erythema or cerumen, small scab at 6 o'clock position appears healing without signs of infection, left TM pearly grey with normal light reflex, no effusion, injection, or bulging  NECK: No adenopathy in cervical or supraclavicular regions  RESP: Lungs clear to auscultation - no rales, rhonchi or wheezes   CV: Regular rates and rhythm, normal S1 S2, no S3 or S4, no murmur, click or rub  MS: No musculoskeletal defects are noted and gait is age appropriate without ataxia   SKIN: No suspicious lesions or rashes, hydration status appears adeuqate with normal skin turgor   PSYCH: Alert and oriented x3; speech- coherent , normal rate and volume; able to articulate logical thoughts, able to abstract reason, no tangential thoughts, no hallucinations or delusions, mentation appears normal, Mood is euthymic. Affect is appropriate for this mood state and bright. Thought content is free of suicidal ideation, hallucinations, and delusions. Dress is adequate and upkept. Eye contact is good during conversation.       Diagnostic Test Results:  Labs reviewed in Epic  none         Assessment & Plan       ICD-10-CM    1. Anal fissure  K60.2 COLORECTAL SURGERY REFERRAL   2. Abscess of anal and rectal regions  K61.2 COLORECTAL SURGERY REFERRAL   3. Crohn's disease of colon with other complication (H)  K50.118    4. Adjustment disorder with mixed anxiety and depressed mood  F43.23 clonazePAM (KLONOPIN) 0.5 MG " ODT   5. Anxiety  F41.9    6. Bipolar disorder, current episode mixed, severe, without psychotic features (H)  F31.63    7. Bee sting allergy  Z91.030 EPINEPHrine (ANY BX GENERIC EQUIV) 0.3 MG/0.3ML injection 2-pack   8. Morbid obesity (H)  E66.01      1 & 2. Anal fissure and abscess  - Did not examine per patient request  - Reviewed ED notes with patient      Will continue to drain for 2 weeks      Continue antibiotics to full course as prescribed      Discussed this could be causing colitis as the ED doctor thought          Needs to follow up with GI specialist to get on better regimen for her colitis/IBS symptoms          She does report overall improvement with no vomiting since stopping pain medications           For now continue medications as prescribed, continue Zofran for nausea       Team helped patient to set up appointment for GI specialist and Colorectal surgery      3. Crohn's/may or may not have due to last colonoscopy, but definitely IBS      As above, no change to medications, and recommend follow up with GI as there is nothing I can do in clinic for her     4-6. Mood   - Patient reports so much anxiety due to upcoming surgery, she had first intake with psychiatry, goes to weekly counseling       Dr. Renata Lucas MD [facility] - hearing but signs      [facility] counseling - she is deaf       Was told no medication changes until after surgery     Continues on Minipress, Abilify, and Cymbalta         Could go up on any of these but will not change and allow psychiatry to do this       Patient is requesting something short term to get her through until surgery in 2 weeks, just so she can sleep   - Failed on Vistaril in the past   - States Klonopin worked well     Will give #14 to get her through, advised of use and side effects including sedation and no driving on this medication  -  reviewed, no concerns     7.  - Reports Epi pen , wants new one due to bee allergy and getting  warmer out   - Reviewed use and side effects, refilled     8.   - Advised needs to lose weight, needs to work on being active despite her pain, if not full exercise needs to just be active, work on increasing steps or doing stretching/weights     Due to language barrier, an  was present during the history-taking and subsequent discussion (and for part of the physical exam) with this patient.    The patient indicates understanding of these issues and agrees with the plan.    Return in about 1 week (around 3/17/2020) for pre op appointment .    Aura Rosario PA-C  [facility]

## 2020-03-13 ENCOUNTER — NURSE TRIAGE (OUTPATIENT)
Dept: NURSING | Facility: CLINIC | Age: 30
End: 2020-03-13

## 2020-03-14 NOTE — TELEPHONE ENCOUNTER
Anal/rectal abscess drained at ED on 3/9. Saw PCP in follow-up 3/10. Pt states since yesterday the pain in rectal area has become worse. Taking warm saline sitz baths. Has not taken anything for pain tonight.  Rates pain 2 out of 10 severity. No fever. Yellow discharge from rectal area. Advised see provider w/i 24 hrs.     Reason for Disposition    Rectal area looks infected (e.g., draining sore, spreading redness)    Additional Information    Negative: Foreign body in rectum    Negative: Diarrhea is main symptom    Negative: Constipation is main symptom (e.g., pain or discomfort caused by passage of hard BMs)    Negative: Blood in or on bowel movement is main symptom    Negative: Pregnant    Negative: Pinworms are suspected (rectal itching; (white thread-like worm about size of a staple, moves)    Negative: Sexual assault    Negative: Injury to rectum    Negative: Large mass protruding out of rectum    Negative: Patient sounds very sick or weak to the triager    Negative: SEVERE rectal pain (e.g., excruciating, unable to have a bowel movement)    Negative: [1] Rectal pain or redness AND [2] fever    Negative: [1] Sudden onset rectal pain AND [2] constipated (straining, rectal pressure or fullness) AND [3] NOT better after SITZ bath, suppository or enema    Negative: MODERATE-SEVERE rectal pain (i.e., interferes with school, work, or sleep)    Negative: MODERATE-SEVERE rectal itching (i.e., interferes with school, work, or sleep)    Negative: Caller is worried about a sexually transmitted disease (STD)    Commented on: Rash of rectal area (e.g., open sore, painful tiny water blisters, unexplained bumps)     Cannot see    Protocols used: RECTAL SYMPTOMS-A-AH

## 2020-03-15 ENCOUNTER — HOSPITAL ENCOUNTER (EMERGENCY)
Facility: CLINIC | Age: 30
Discharge: HOME | End: 2020-03-15
Attending: PHYSICIAN ASSISTANT | Admitting: PHYSICIAN ASSISTANT
Payer: MEDICARE

## 2020-03-15 ENCOUNTER — HEALTH MAINTENANCE LETTER (OUTPATIENT)
Age: 30
End: 2020-03-15

## 2020-03-15 VITALS
SYSTOLIC BLOOD PRESSURE: 129 MMHG | RESPIRATION RATE: 18 BRPM | OXYGEN SATURATION: 97 % | TEMPERATURE: 98.8 F | HEART RATE: 134 BPM | DIASTOLIC BLOOD PRESSURE: 92 MMHG

## 2020-03-15 DIAGNOSIS — K61.1 PERIRECTAL ABSCESS: ICD-10-CM

## 2020-03-15 PROCEDURE — 25000132 ZZH RX MED GY IP 250 OP 250 PS 637: Mod: GY | Performed by: PHYSICIAN ASSISTANT

## 2020-03-15 PROCEDURE — 99283 EMERGENCY DEPT VISIT LOW MDM: CPT | Performed by: PHYSICIAN ASSISTANT

## 2020-03-15 PROCEDURE — 99284 EMERGENCY DEPT VISIT MOD MDM: CPT | Mod: Z6 | Performed by: PHYSICIAN ASSISTANT

## 2020-03-15 RX ORDER — SULFAMETHOXAZOLE/TRIMETHOPRIM 800-160 MG
1 TABLET ORAL ONCE
Status: COMPLETED | OUTPATIENT
Start: 2020-03-15 | End: 2020-03-15

## 2020-03-15 RX ORDER — SULFAMETHOXAZOLE/TRIMETHOPRIM 800-160 MG
1 TABLET ORAL 2 TIMES DAILY
Qty: 14 TABLET | Refills: 0 | Status: SHIPPED | OUTPATIENT
Start: 2020-03-15 | End: 2020-03-23

## 2020-03-15 RX ADMIN — SULFAMETHOXAZOLE AND TRIMETHOPRIM 1 TABLET: 800; 160 TABLET ORAL at 22:19

## 2020-03-15 NOTE — ED AVS SNAPSHOT
[facility]  [address] [place] [address]  Phone:  [phone]  Fax:  [phone]                                    Katy Islas   MRN: [id number]    Department:  [facility]   Date of Visit:  3/15/2020           After Visit Summary Signature Page    I have received my discharge instructions, and my questions have been answered. I have discussed any challenges I see with this plan with the nurse or doctor.    ..........................................................................................................................................  Patient/Patient Representative Signature      ..........................................................................................................................................  Patient Representative Print Name and Relationship to Patient    ..................................................               ................................................  Date                                   Time    ..........................................................................................................................................  Reviewed by Signature/Title    ...................................................              ..............................................  Date                                               Time          22EPIC Rev 08/18

## 2020-03-16 ENCOUNTER — TELEPHONE (OUTPATIENT)
Dept: FAMILY MEDICINE | Facility: OTHER | Age: 30
End: 2020-03-16

## 2020-03-16 ENCOUNTER — MYC MEDICAL ADVICE (OUTPATIENT)
Dept: FAMILY MEDICINE | Facility: OTHER | Age: 30
End: 2020-03-16

## 2020-03-16 ENCOUNTER — MYC MEDICAL ADVICE (OUTPATIENT)
Dept: OBGYN | Facility: OTHER | Age: 30
End: 2020-03-16

## 2020-03-16 ENCOUNTER — PATIENT OUTREACH (OUTPATIENT)
Dept: CARE COORDINATION | Facility: CLINIC | Age: 30
End: 2020-03-16

## 2020-03-16 ASSESSMENT — ACTIVITIES OF DAILY LIVING (ADL): DEPENDENT_IADLS:: INDEPENDENT

## 2020-03-16 NOTE — DISCHARGE INSTRUCTIONS
Discontinue taking the antibiotics tomorrow morning.  Continue with sitz bath to help alleviate drainage.  Have the abscess rechecked at your appointment tomorrow morning.  Try taking an over-the-counter probiotic to help get your gut bacteria back to normal.  Return to the emergency department for any worsening symptoms.    Thank you for choosing [facility]'s Emergency Department. It was a pleasure taking care of you today. If you have any questions, please call [phone].    Marian Barton PA-C

## 2020-03-16 NOTE — TELEPHONE ENCOUNTER
Spoke to patient via MA and .      No fever.   98.5 temp.   Cough for couple weeks.   Dry cough.     Pre-Op for hysterectomy.  Perianal abscess seen in ED for yesterday.   Put on Sulfa for her abscess.     Wants hospital follow up.     No other symptoms.   No new symptoms since ED visit.     Foot: 2-3 weeks when walking thinks she has arthritis.     Horrible pain and feels bumpy in her foot.     Patient does not want to rescheudle or cancel her surgery. RN advised that due to COVID 19, her surgery will likely be rescheduled. Patient states understanding and left.     Per MA, patient left and is going home.    Isabella Vazquez RN BSN

## 2020-03-16 NOTE — ED TRIAGE NOTES
"Patient reports she had a rectal abscess lanced 2 weeks ago in [place] ER, was on amoxicillin. In tonight with what she states is another abscess that has \"ruptured\". Patient is deaf and an  will be here around 2140.   "

## 2020-03-16 NOTE — PROGRESS NOTES
Clinic Care Coordination Contact  [facility]/Voicemail       Clinical Data: Care Coordinator Outreach  Outreach attempted x 1.  Home and cell phone are the same and a busy tone was heard, dialed the temporary number and recording stated call cannot be completed.     Plan: Care Coordinator will send care coordination introduction letter with care coordinator contact information and explanation of care coordination services via mail. Care Coordinator will try to reach patient again in 1-2 business days.    RADHA Otto RN Clinic Care Coordinator   [place], [place], [place]  Phone: [phone]

## 2020-03-16 NOTE — ED NOTES
Pt has had a recent rectal abscess drained and has completed her abx.  She noticed a new abscess forming that ruptured today and has drained quite a bit.  It is painful and she is c/o nausea and just not feeling well.  Her ASL interp is enroute and she would like to wait for that person b/4 exam.

## 2020-03-16 NOTE — LETTER
[facility] CARE [facility]  [address]  [place] [address]   March 16, 2020    Katy Islas  [address]  [place] [address]      Dear Katy,    I am a clinic care coordinator who works with Aura Rosario PA-C at [facility].  Below is a description of clinic care coordination and how I can further assist you.      The clinic care coordinator team is made up of a registered nurse,  and community health worker who understand the health care system. The goal of clinic care coordination is to help you manage your health and improve access to the health care system in the most efficient manner. The team can assist you in meeting your health care goals by providing education, coordinating services, strengthening the communication among your providers  and supporting you with any resource needs.    Please feel free to contact me, at [phone] with any questions or concerns. We are focused on providing you with the highest-quality healthcare experience possible and that all starts with you.     Sincerely,     RADHA Otto RN Clinic Care Coordinator   [place]  Phone: [phone]

## 2020-03-16 NOTE — ED PROVIDER NOTES
"  History     Chief Complaint   Patient presents with     Wound Check     The history is provided by the patient.     Katy Islas is a 29 year old female who presents to the emergency department with concerns of a wound. The patient was seen at the [facility] ED about two and a half weeks ago for a rectal abscess. An incision and drainage was done and she was put on a ten day course of Augmentin. She says that now she believes she has a new abscess, in a different spot, that opened and drained on its own prior to arrival. She says it was a lot of green pus that came out. She complains of a stomach ache and nausea as well but no vomiting or diarrhea. She says she can still feel something hard in her rectum and says it is swollen. The patient has been using medications for pain along with a \"toilet sitz bath\". She says this is shaped like a bowl that goes onto the toilet, so that she does not have to sit in a bathtub. She uses this regularly after having surgeries. She notes coming to the ED because she does not want her infection to worsen before her surgery. She says she is having a hysterectomy on 03/25/2020 for very painful periods. She sees pre-op for this tomorrow. She also mentions having to see cholerectal surgeon in two days for this rectal abscess issue. The patient has not been taking a probiotic, but has been using Diflucan for a yeast infection. She denies having a cough, but says she has asthma and allergies so she has felt congested. The patient has felt mildly depressed due to recurrent illness and infections, but denies any thoughts of self harm.     Allergies:  Allergies   Allergen Reactions     Iohexol Hives     Other reaction(s): Hives  IV contrast; patient states she tolerates contrast if she gets benadryl before  IV contrast; patient states she tolerates contrast if she gets benadryl before     Ativan [Lorazepam] Hives     At the IV site     Baclofen      hives     Bees Hives, Swelling and " Difficulty breathing     Caffeine      Contrast Dye      Hives,   Updated 5/10/2016 CT Contrast.     Iodine Hives     Methocarbamol Swelling     Metoclopramide      Other reaction(s): Tremors  LW Reaction: shaking/sweating     Midazolam      Other reaction(s): Agitation     Monosodium Glutamate      Morphine Other (See Comments)     Difficulty with urination     Nsaids Other (See Comments)     GI BLEED x2     Other (Do Not Use) Other (See Comments)     Xanaflex- pt becomes disoriented and loses bladder control     Reglan [Metoclopramide Hcl] Other (See Comments)     shaking     Soma [Carisoprodol] Visual Disturbance     Sleep walking     Tizanidine      Topamax Other (See Comments)     Topamax [Topiramate] Nausea     Tingling  GI/Vomit     Tramadol      Severe Headache, Seizure Risk     Tylenol W/Codeine [Acetaminophen-Codeine] Nausea and Itching     Tylenol 3     Versed Other (See Comments)     Zolmitriptan      Makes face feel like its twitching     Droperidol Anxiety     Flu Virus Vaccine Rash      Arm swelling       Problem List:    Patient Active Problem List    Diagnosis Date Noted     Morbid obesity (H) 03/10/2020     Priority: Medium     Abnormal uterine bleeding 02/12/2020     Priority: Medium     Added automatically from request for surgery [id number]       Anal fissure 02/04/2020     Priority: Medium     Low hematocrit 02/04/2020     Priority: Medium     Elevated d-dimer 02/04/2020     Priority: Medium     Hypertension goal BP (blood pressure) < 130/80 08/05/2019     Priority: Medium     Bulge of cervical disc without myelopathy 04/24/2019     Priority: Medium     Cervicalgia 04/17/2019     Priority: Medium     Class 1 obesity due to excess calories without serious comorbidity with body mass index (BMI) of 32.0 to 32.9 in adult 04/03/2019     Priority: Medium     Hypophosphatemia 11/01/2018     Priority: Medium     Patellar maltracking, right 09/05/2018     Priority: Medium     Right anterior knee pain  09/05/2018     Priority: Medium     Melanocytic nevus, unspecified location 07/23/2018     Priority: Medium     Dysplastic skin lesion 07/23/2018     Priority: Medium     Iliotibial band syndrome affecting lower leg, right 12/21/2017     Priority: Medium     Chondromalacia of right patellofemoral joint 12/21/2017     Priority: Medium     Upper GI bleed - suspected 07/01/2017     Priority: Medium     Tobacco use disorder 07/01/2017     Priority: Medium     History of pseudoseizure 07/01/2017     Priority: Medium     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) 03/10/2017     Priority: Medium     Lumbar disc disease with radiculopathy 02/23/2017     Priority: Medium     S/P lumbar fusion 02/23/2017     Priority: Medium     Dr. YOVANI Millan, 2/23/17       Vitamin D deficiency 01/06/2017     Priority: Medium     Atypical mole 01/06/2017     Priority: Medium     Mild persistent asthma without complication 12/02/2016     Priority: Medium     Chronic bilateral low back pain with left-sided sciatica 12/02/2016     Priority: Medium     Bee sting allergy 09/16/2016     Priority: Medium     Gastroesophageal reflux disease without esophagitis 08/26/2016     Priority: Medium     Herpes simplex virus infection 11/12/2015     Priority: Medium     Adjustment disorder with mixed anxiety and depressed mood 10/14/2015     Priority: Medium     Marijuana abuse 02/22/2015     Priority: Medium     Bipolar disorder (H) 01/31/2013     Priority: Medium     Problem list name updated by automated process. Provider to review       Chronic pain 02/09/2012     Priority: Medium     Patient is followed by Aura Rosario PA-C for ongoing prescription of pain medication.  All refills should only be approved by this provider, or covering partner.    Medication(s): Norco    Maximum quantity per month: 70  Clinic visit frequency required: Q 2 months     Controlled substance agreement:   Discussed and signed 4/25/17    Encounter-Level  CSA - 01/12/2015:                 Controlled Substance Agreement - Scan on 1/26/2015  9:14 AM : Controlled Medication Agreement-01/12/15 (below)            Pain Clinic evaluation in the past: Yes       Date/Location:   [facility], fall 2016    DIRE Total Score(s):    4/25/2017   Total Score 17       Last [facility] website verification:  done on 4/25/17 by LOVE Maki   [email or web address]           Anxiety 09/22/2011     Priority: Medium     Mild major depression (H) 08/29/2011     Priority: Medium     Mild intermittent asthma 10/12/2009     Priority: Medium     Overview:   Formatting of this note might be different from the original.  Action plan: See letter 4/10/2013   ATAQ:   Asthma Data 4/10/2013   Date completed 4/10/2013   Missed daily activities no = 0   Wake at night no = 0   Believe asthma in control yes = 0   ARIN use yes   Maximum ARIN use in 1 day 1-4 = 0   ATAQ score 0 = well controlled   Asthma ED visits in past 12 mos 0   Asthma hospitalizations in past 12 mos 0     Current Outpatient Prescriptions   Medication Sig     albuterol (PROVENTIL) 0.083 % neb solution Inhale 3 mL via a nebulizer every 4 hours if needed for Shortness Of Breath.        Crohn's colitis (H) 08/04/2009     Priority: Medium     Dysmenorrhea 05/11/2007     Priority: Medium     Benign neoplasm of skin of lower limb, including hip 03/16/2004     Priority: Medium     Migraine      Priority: Medium     Dr. Farrar - Neurology.  Now on Inderal.  Seems to be working.  Follow-up 9/08  Problem list name updated by automated process. Provider to review       Hearing loss      Priority: Medium     Congenital  Problem list name updated by automated process. Provider to review       Allergic rhinitis      Priority: Medium     Problem list name updated by automated process. Provider to review          Past Medical History:    Past Medical History:   Diagnosis Date     Abdominal pain 10/31- 11/4/2005     Allergic rhinitis, cause  unspecified      Arthritis      C. 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, without mention of intractable epilepsy      Migraine 07/21/12     Migraine, unspecified, without mention of intractable migraine without mention of status migrainosus      Mild intermittent asthma      Mycoplasma infection in conditions classified elsewhere and of unspecified site      Other chronic pain      Renal disease      Unspecified hearing loss        Past Surgical History:    Past Surgical History:   Procedure Laterality Date     AS REMOVAL OF COCCYX  10/18/2017     C FUSION OF SACROILIAC JOINT  10/26/2018     [facility]  [facility].     COLONOSCOPY N/A 7/17/2019    Procedure: Colonoscopy, With Polypectomy And Biopsy;  Surgeon: Edwin Conde MD;  Location: [facility] OR     COLONOSCOPY WITH CO2 INSUFFLATION N/A 7/17/2019    Procedure: COLONOSCOPY, WITH CO2 INSUFFLATION;  Surgeon: Edwin Conde MD;  Location: [facility] OR     COMBINED ESOPHAGOSCOPY, GASTROSCOPY, DUODENOSCOPY (EGD) WITH CO2 INSUFFLATION N/A 4/12/2019    Procedure: COMBINED ESOPHAGOSCOPY, GASTROSCOPY, DUODENOSCOPY (EGD) WITH CO2 INSUFFLATION;  Surgeon: Edwin Conde MD;  Location: [facility] OR     ESOPHAGOSCOPY, GASTROSCOPY, DUODENOSCOPY (EGD), COMBINED N/A 4/12/2019    Procedure: Combined Esophagoscopy, Gastroscopy, Duodenoscopy (Egd), Biopsy Single Or Multiple;  Surgeon: Edwin Conde MD;  Location: [facility] OR     FUSION SPINE POSTERIOR MINIMALLY INVASIVE ONE LEVEL N/A 2/23/2017    Procedure: FUSION SPINE POSTERIOR MINIMALLY INVASIVE ONE LEVEL;  L4-5 Oblique Lateral Lumbar Interbody Fusion    Epidural steroid injection.   Transpedicular Bone marrow aspiration;  Surgeon: Jeniffer Eugene MD;  Location: [facility] OR     HC COLONOSCOPY THRU STOMA WITH BIOPSY  10/29/2003    Impression is that of normal appearing colonoscopy, without evidence of rectal bleeding.     HC COLONOSCOPY THRU STOMA, DIAGNOSTIC  10/00    normal     HC COLONOSCOPY THRU STOMA, DIAGNOSTIC  Oct 2009    Dr López- normal     HC EEG AWAKE AND SLEEP      abnormal     HC MRI BRAIN W/O CONTRAST  12/00    normal     HC REMOVAL GALLBLADDER  8/5/2009    [facility], [facility].     HC UGI ENDOSCOPY DIAG W BIOPSY  11/11/09    Normal esophagus     HC UGI ENDOSCOPY, SIMPLE EXAM  7/00, 10/00    mild chronic esophagitis and duodenitis, neg H pylori     HC UGI ENDOSCOPY, SIMPLE EXAM  01/20/2005    Esophagogastroduodenoscopy, colonoscopy with biopsies.  [facility] UGI ENDOSCOPY, SIMPLE EXAM  7/1/2009    [facility], [facility].      UGI ENDOSCOPY, SIMPLE EXAM  11/11/2009    attempted upper GI, pt. could not tolerate procedure:[facility]     ORTHOPEDIC SURGERY  October 19,2011    diskectomy L4-L5       Family History:    Family History   Problem Relation Age of Onset     Gastrointestinal Disease Brother         severe Crohn's     Neurologic Disorder Brother         Seizures post head injury     Depression Brother      Substance Abuse Brother      Genitourinary Problems Father         kidney stones     Diabetes Father      Heart Disease Father         Open heart surgery     Breast Cancer Maternal Grandmother      Parkinsonism Maternal Grandmother      Cerebrovascular Disease Paternal Grandmother      Cancer Maternal Grandfather         Lung     Cardiovascular Paternal Grandfather         Heart Attack     Substance Abuse Mother         in recovery x1 year      Lung Cancer Paternal Uncle      Cancer Paternal Uncle      Lung Cancer Maternal Uncle        Social History:  Marital Status:  Single [1]  Social  History     Tobacco Use     Smoking status: Current Every Day Smoker     Packs/day: 0.25     Years: 5.00     Pack years: 1.25     Types: Cigarettes     Smokeless tobacco: Never Used     Tobacco comment: due to illness    Substance Use Topics     Alcohol use: Not Currently     Comment: Not since last July 2018     Drug use: Yes     Types: Marijuana     Comment: Medical Marijuana currently-- More CBD        Medications:    sulfamethoxazole-trimethoprim (BACTRIM DS) 800-160 MG tablet  acetaminophen (TYLENOL) 500 MG tablet  albuterol (PROAIR HFA/PROVENTIL HFA/VENTOLIN HFA) 108 (90 Base) MCG/ACT inhaler  albuterol (PROVENTIL) (2.5 MG/3ML) 0.083% neb solution  alum & mag hydroxide-simethicone (MAALOX ADVANCED MAX ST) 400-400-40 MG/5ML SUSP suspension  ARIPiprazole (ABILIFY) 15 MG tablet  aspirin (ASA) 81 MG tablet  azelastine (OPTIVAR) 0.05 % ophthalmic solution  busPIRone (BUSPAR) 15 MG tablet  clindamycin (CLEOCIN) 150 MG capsule  clonazePAM (KLONOPIN) 0.5 MG ODT  cyclobenzaprine (FLEXERIL) 10 MG tablet  diphenhydrAMINE (BENADRYL) 50 MG capsule  DULoxetine (CYMBALTA) 60 MG EC capsule  EPINEPHrine (ANY BX GENERIC EQUIV) 0.3 MG/0.3ML injection 2-pack  fexofenadine (ALLEGRA) 180 MG tablet  gabapentin (NEURONTIN) 300 MG capsule  lidocaine (XYLOCAINE) 2 % solution  losartan (COZAAR) 100 MG tablet  medical cannabis (Patient's own supply)  medroxyPROGESTERone (DEPO-PROVERA) 150 MG/ML IM injection  methocarbamol (ROBAXIN) 750 MG tablet  metoprolol succinate ER (TOPROL-XL) 200 MG 24 hr tablet  neomycin-polymyxin-hydrocortisone (CORTISPORIN) 3.5-41975-8 otic suspension  NONFORMULARY  ondansetron (ZOFRAN-ODT) 8 MG ODT tab  pantoprazole (PROTONIX) 40 MG EC tablet  prazosin (MINIPRESS) 1 MG capsule  prochlorperazine (COMPAZINE) 10 MG tablet  rizatriptan (MAXALT-MLT) 5 MG ODT  sucralfate (CARAFATE) 1 GM tablet  valACYclovir (VALTREX) 1000 mg tablet          Review of Systems   All other systems reviewed and are  negative.      Physical Exam   BP: (!) 129/92  Pulse: 134  Heart Rate: 134  Temp: 98.8  F (37.1  C)  Resp: 18  SpO2: 97 %      Physical Exam  Vitals signs and nursing note reviewed.   Constitutional:       General: She is not in acute distress.     Appearance: Normal appearance. She is not ill-appearing, toxic-appearing or diaphoretic.   HENT:      Head: Normocephalic and atraumatic.      Nose: Nose normal.      Mouth/Throat:      Mouth: Mucous membranes are moist.      Pharynx: Oropharynx is clear.   Eyes:      Extraocular Movements: Extraocular movements intact.      Conjunctiva/sclera: Conjunctivae normal.      Pupils: Pupils are equal, round, and reactive to light.   Neck:      Musculoskeletal: Normal range of motion and neck supple.   Cardiovascular:      Rate and Rhythm: Normal rate and regular rhythm.      Pulses: Normal pulses.      Heart sounds: Normal heart sounds. No murmur.   Pulmonary:      Effort: Pulmonary effort is normal. No respiratory distress.      Breath sounds: Normal breath sounds. No wheezing.   Chest:      Chest wall: No tenderness.   Abdominal:      General: Bowel sounds are normal.      Palpations: Abdomen is soft.      Tenderness: There is abdominal tenderness (minimal with deep palpation throughout). There is no right CVA tenderness, left CVA tenderness, guarding or rebound.   Genitourinary:     Rectum: Tenderness present.      Comments: At superior aspect of rectum there is a small ulcerative wound with scant white drainage expressed but no significant underlying fluctuant mass, no significant swelling surrounding area.  Tender to touch throughout rectal area.  Musculoskeletal: Normal range of motion.         General: No deformity.   Skin:     General: Skin is warm and dry.      Capillary Refill: Capillary refill takes less than 2 seconds.      Coloration: Skin is not pale.      Findings: No rash.   Neurological:      General: No focal deficit present.      Mental Status: She is alert  and oriented to person, place, and time.   Psychiatric:         Thought Content: Thought content normal.         Judgment: Judgment normal.         ED Course        Procedures      No results found. However, due to the size of the patient record, not all encounters were searched. Please check Results Review for a complete set of results.    Medications   sulfamethoxazole-trimethoprim (BACTRIM DS) 800-160 MG per tablet 1 tablet (1 tablet Oral Given 3/15/20 2219)       Assessments & Plan (with Medical Decision Making)  Katy Islas is a 29 year old female who presents to the emergency department with concerns of a rectal abscess. The patient has a history of rectal abscesses and notes recently having one drained about two and a half weeks ago. She believes she has another that opened and drained on its own tonight. She says she ran out of her ten day course of Augmentin and does not want her infection to worsen. Upon arrival in the ED the patient was afebrile.  Tachycardic which did improve some by the time I evaluated her.  Blood pressure was normal.  She did not appear acutely ill on exam today.  Noted to have minimal tenderness throughout abdomen with deep palpation but no focal guarding or rebound.  She also was noted to have an ulcerative wound to the superior aspect of the rectum with scant drainage present but no underlying fluctuance, consistent with likely an abscess that spontaneously drained.  I did not feel any palpable masses or fluctuant areas in the rectal area that would require drainage today.   With her otherwise benign exam and no fevers she felt comfortable with holding on further work-up at this time. It looks like she does have a rectal infection again however and I think with her history would benefit from antibiotics to help reduce recurrence.  She was comfortable with plan for 7-day course of Bactrim.  She was given 1 dose here and will  the rest in the morning at the pharmacy.   She can have the wound re-looked at tomorrow at her preop appointment or wait to see her surgeon at her appointment in 2 days.  Encouraged continued sitz baths to help with drainage.  I went over warning signs and symptoms of when to return to the ED.  All questions were answered and patient discharged home in suitable condition.     I have reviewed the nursing notes.    I have reviewed the findings, diagnosis, plan and need for follow up with the patient.    Discharge Medication List as of 3/15/2020 10:18 PM      START taking these medications    Details   sulfamethoxazole-trimethoprim (BACTRIM DS) 800-160 MG tablet Take 1 tablet by mouth 2 times daily for 7 days, Disp-14 tablet,R-0, E-Prescribe             Final diagnoses:   Perirectal abscess       This document serves as a record of services personally performed by Marian Barton PA*. It was created on their behalf by Chritsa Barton, a trained medical scribe. The creation of this record is based on the provider's personal observations and the statements of the patient. This document has been checked and approved by the attending provider.  Note: Chart documentation done in part with Dragon Voice Recognition software. Although reviewed after completion, some word and grammatical errors may remain.  3/15/2020   [facility] EMERGENCY DEPARTMENT     Marian Barton PA-C  03/15/20 4816

## 2020-03-17 ENCOUNTER — TELEPHONE (OUTPATIENT)
Dept: FAMILY MEDICINE | Facility: OTHER | Age: 30
End: 2020-03-17

## 2020-03-17 NOTE — TELEPHONE ENCOUNTER
Please tell patient like we have been telling everyone else. She should contact her surgeon to see if it is still scheduled for surgery. If it is, we will reschedule her pre-op.     Zach Rosario PA-C  [facility]

## 2020-03-17 NOTE — TELEPHONE ENCOUNTER
Spoke with patient.    2-3 days of back pain.  Not sure if she hurt her back from scrubbing the kitchen floor or if its a kidney infection.  Has not been this back for a long time.  Also has asthma and is coughing.  Doesn't think she has a fever. But temp yesterday was  99.3 and still has abscess that rupture and has green pus coming out of it. She would like to come in and leave a urine and vaginal sample as she is having discharge as well.  Advised that we are not having any patient come into clinic that has a fever or cough.      Encouraged her to do an E-visit and be as detailed as possible.    Patient agreed to plan.    Iggy Bernstein, RN, BSN

## 2020-03-17 NOTE — TELEPHONE ENCOUNTER
Replied via [email or web address]- routing comment to me from KK  The surgery schedulers will be contacting Katy to discuss her scheduled hysterectomy next week. I know that she has been looking forward to this hysterectomy, but unfortunately because the case is considered elective, it is now going to be canceled and will need to be rescheduled at a later date. This is per our current policy here to try to reduce transmission of the COVID-19 virus and ensure adequate supplies in event of hospital necessity.     Mnoique Berumen, DO

## 2020-03-17 NOTE — PROGRESS NOTES
Clinic Care Coordination Contact  [facility]    Referral Source: ED Follow-Up  Clinical Data: Care Coordinator Outreach  Outreach attempted x 2.  Home and cell phone are the same and a busy tone was heard, dialed the temporary number and recording stated call cannot be completed.     Plan: Care Coordinator sent care coordination introduction letter on 3/16/20 via mail. Care Coordinator will do no further outreaches at this time.    RADHA Otto RN Clinic Care Coordinator   [place], [place], [place]  Phone: [phone]

## 2020-03-19 ENCOUNTER — MYC MEDICAL ADVICE (OUTPATIENT)
Dept: FAMILY MEDICINE | Facility: OTHER | Age: 30
End: 2020-03-19

## 2020-03-20 ENCOUNTER — HOSPITAL ENCOUNTER (EMERGENCY)
Facility: CLINIC | Age: 30
Discharge: HOME | End: 2020-03-20
Attending: FAMILY MEDICINE | Admitting: FAMILY MEDICINE
Payer: MEDICARE

## 2020-03-20 ENCOUNTER — APPOINTMENT (OUTPATIENT)
Dept: CT IMAGING | Facility: CLINIC | Age: 30
End: 2020-03-20
Attending: FAMILY MEDICINE
Payer: MEDICARE

## 2020-03-20 VITALS
RESPIRATION RATE: 18 BRPM | OXYGEN SATURATION: 99 % | BODY MASS INDEX: 35.87 KG/M2 | DIASTOLIC BLOOD PRESSURE: 62 MMHG | HEIGHT: 61 IN | TEMPERATURE: 98.1 F | SYSTOLIC BLOOD PRESSURE: 111 MMHG | HEART RATE: 78 BPM | WEIGHT: 190 LBS

## 2020-03-20 DIAGNOSIS — R10.84 ABDOMINAL PAIN, GENERALIZED: ICD-10-CM

## 2020-03-20 DIAGNOSIS — K50.914 CROHN'S DISEASE WITH ABSCESS, UNSPECIFIED GASTROINTESTINAL TRACT LOCATION (H): ICD-10-CM

## 2020-03-20 LAB
ALBUMIN SERPL-MCNC: 3.7 G/DL (ref 3.4–5)
ALP SERPL-CCNC: 86 U/L (ref 40–150)
ALT SERPL W P-5'-P-CCNC: 26 U/L (ref 0–50)
ANION GAP SERPL CALCULATED.3IONS-SCNC: 4 MMOL/L (ref 3–14)
AST SERPL W P-5'-P-CCNC: 17 U/L (ref 0–45)
BASOPHILS # BLD AUTO: 0 10E9/L (ref 0–0.2)
BASOPHILS NFR BLD AUTO: 0.4 %
BILIRUB SERPL-MCNC: 0.2 MG/DL (ref 0.2–1.3)
BUN SERPL-MCNC: 19 MG/DL (ref 7–30)
CALCIUM SERPL-MCNC: 8.9 MG/DL (ref 8.5–10.1)
CHLORIDE SERPL-SCNC: 109 MMOL/L (ref 94–109)
CO2 SERPL-SCNC: 28 MMOL/L (ref 20–32)
CREAT SERPL-MCNC: 1.02 MG/DL (ref 0.52–1.04)
CRP SERPL-MCNC: <2.9 MG/L (ref 0–8)
DIFFERENTIAL METHOD BLD: ABNORMAL
EOSINOPHIL NFR BLD AUTO: 1.7 %
ERYTHROCYTE [DISTWIDTH] IN BLOOD BY AUTOMATED COUNT: 12.3 % (ref 10–15)
ERYTHROCYTE [SEDIMENTATION RATE] IN BLOOD BY WESTERGREN METHOD: 13 MM/H (ref 0–20)
GFR SERPL CREATININE-BSD FRML MDRD: 73 ML/MIN/{1.73_M2}
GLUCOSE SERPL-MCNC: 90 MG/DL (ref 70–99)
HCT VFR BLD AUTO: 38.5 % (ref 35–47)
HGB BLD-MCNC: 13.4 G/DL (ref 11.7–15.7)
IMM GRANULOCYTES # BLD: 0 10E9/L (ref 0–0.4)
IMM GRANULOCYTES NFR BLD: 0.2 %
LIPASE SERPL-CCNC: 118 U/L (ref 73–393)
LYMPHOCYTES # BLD AUTO: 1.7 10E9/L (ref 0.8–5.3)
LYMPHOCYTES NFR BLD AUTO: 36.7 %
MCH RBC QN AUTO: 33.9 PG (ref 26.5–33)
MCHC RBC AUTO-ENTMCNC: 34.8 G/DL (ref 31.5–36.5)
MCV RBC AUTO: 98 FL (ref 78–100)
MONOCYTES # BLD AUTO: 0.6 10E9/L (ref 0–1.3)
MONOCYTES NFR BLD AUTO: 11.8 %
NEUTROPHILS # BLD AUTO: 2.3 10E9/L (ref 1.6–8.3)
NEUTROPHILS NFR BLD AUTO: 49.2 %
NRBC # BLD AUTO: 0 10*3/UL
NRBC BLD AUTO-RTO: 0 /100
PLATELET # BLD AUTO: 298 10E9/L (ref 150–450)
POTASSIUM SERPL-SCNC: 3.8 MMOL/L (ref 3.4–5.3)
PROT SERPL-MCNC: 7.3 G/DL (ref 6.8–8.8)
RBC # BLD AUTO: 3.95 10E12/L (ref 3.8–5.2)
SODIUM SERPL-SCNC: 141 MMOL/L (ref 133–144)
WBC # BLD AUTO: 4.7 10E9/L (ref 4–11)

## 2020-03-20 PROCEDURE — 99285 EMERGENCY DEPT VISIT HI MDM: CPT | Mod: 25 | Performed by: FAMILY MEDICINE

## 2020-03-20 PROCEDURE — 80053 COMPREHEN METABOLIC PANEL: CPT | Performed by: FAMILY MEDICINE

## 2020-03-20 PROCEDURE — 96376 TX/PRO/DX INJ SAME DRUG ADON: CPT | Performed by: FAMILY MEDICINE

## 2020-03-20 PROCEDURE — 99285 EMERGENCY DEPT VISIT HI MDM: CPT | Mod: Z6 | Performed by: FAMILY MEDICINE

## 2020-03-20 PROCEDURE — 25000128 H RX IP 250 OP 636: Performed by: FAMILY MEDICINE

## 2020-03-20 PROCEDURE — 86140 C-REACTIVE PROTEIN: CPT | Performed by: FAMILY MEDICINE

## 2020-03-20 PROCEDURE — 25800030 ZZH RX IP 258 OP 636: Performed by: FAMILY MEDICINE

## 2020-03-20 PROCEDURE — 96374 THER/PROPH/DIAG INJ IV PUSH: CPT | Performed by: FAMILY MEDICINE

## 2020-03-20 PROCEDURE — 96375 TX/PRO/DX INJ NEW DRUG ADDON: CPT | Performed by: FAMILY MEDICINE

## 2020-03-20 PROCEDURE — 74176 CT ABD & PELVIS W/O CONTRAST: CPT

## 2020-03-20 PROCEDURE — 85025 COMPLETE CBC W/AUTO DIFF WBC: CPT | Performed by: FAMILY MEDICINE

## 2020-03-20 PROCEDURE — 83690 ASSAY OF LIPASE: CPT | Performed by: FAMILY MEDICINE

## 2020-03-20 PROCEDURE — 96361 HYDRATE IV INFUSION ADD-ON: CPT | Performed by: FAMILY MEDICINE

## 2020-03-20 PROCEDURE — 85652 RBC SED RATE AUTOMATED: CPT | Performed by: FAMILY MEDICINE

## 2020-03-20 RX ORDER — HYDROMORPHONE HYDROCHLORIDE 1 MG/ML
0.5 INJECTION, SOLUTION INTRAMUSCULAR; INTRAVENOUS; SUBCUTANEOUS
Status: DISCONTINUED | OUTPATIENT
Start: 2020-03-20 | End: 2020-03-21 | Stop reason: HOSPADM

## 2020-03-20 RX ORDER — DIPHENHYDRAMINE HYDROCHLORIDE 50 MG/ML
50 INJECTION INTRAMUSCULAR; INTRAVENOUS ONCE
Status: COMPLETED | OUTPATIENT
Start: 2020-03-20 | End: 2020-03-20

## 2020-03-20 RX ORDER — SODIUM CHLORIDE 9 MG/ML
1000 INJECTION, SOLUTION INTRAVENOUS CONTINUOUS
Status: DISCONTINUED | OUTPATIENT
Start: 2020-03-20 | End: 2020-03-21 | Stop reason: HOSPADM

## 2020-03-20 RX ORDER — PROCHLORPERAZINE MALEATE 10 MG
10 TABLET ORAL EVERY 6 HOURS PRN
Qty: 30 TABLET | Refills: 0 | Status: SHIPPED | OUTPATIENT
Start: 2020-03-20 | End: 2020-03-20

## 2020-03-20 RX ORDER — ONDANSETRON 2 MG/ML
4 INJECTION INTRAMUSCULAR; INTRAVENOUS EVERY 30 MIN PRN
Status: DISCONTINUED | OUTPATIENT
Start: 2020-03-20 | End: 2020-03-21 | Stop reason: HOSPADM

## 2020-03-20 RX ORDER — PROCHLORPERAZINE MALEATE 10 MG
10 TABLET ORAL EVERY 6 HOURS PRN
Qty: 30 TABLET | Refills: 0 | Status: SHIPPED | OUTPATIENT
Start: 2020-03-20 | End: 2020-07-08

## 2020-03-20 RX ADMIN — SODIUM CHLORIDE 1000 ML: 9 INJECTION, SOLUTION INTRAVENOUS at 21:38

## 2020-03-20 RX ADMIN — ONDANSETRON 4 MG: 2 INJECTION INTRAMUSCULAR; INTRAVENOUS at 21:43

## 2020-03-20 RX ADMIN — HYDROMORPHONE HYDROCHLORIDE 0.5 MG: 1 INJECTION, SOLUTION INTRAMUSCULAR; INTRAVENOUS; SUBCUTANEOUS at 21:47

## 2020-03-20 RX ADMIN — DIPHENHYDRAMINE HYDROCHLORIDE 50 MG: 50 INJECTION, SOLUTION INTRAMUSCULAR; INTRAVENOUS at 21:45

## 2020-03-20 RX ADMIN — HYDROMORPHONE HYDROCHLORIDE 0.5 MG: 1 INJECTION, SOLUTION INTRAMUSCULAR; INTRAVENOUS; SUBCUTANEOUS at 22:49

## 2020-03-20 RX ADMIN — PROCHLORPERAZINE EDISYLATE 10 MG: 5 INJECTION INTRAMUSCULAR; INTRAVENOUS at 22:20

## 2020-03-20 ASSESSMENT — MIFFLIN-ST. JEOR: SCORE: 1514.21

## 2020-03-20 NOTE — ED AVS SNAPSHOT
[facility] Emergency Department  [address] [place] [address]  Phone:  [phone]  Fax:  [phone]                                    Katy Islas   MRN: [id number]    Department:  [facility] Emergency Department   Date of Visit:  3/20/2020           After Visit Summary Signature Page    I have received my discharge instructions, and my questions have been answered. I have discussed any challenges I see with this plan with the nurse or doctor.    ..........................................................................................................................................  Patient/Patient Representative Signature      ..........................................................................................................................................  Patient Representative Print Name and Relationship to Patient    ..................................................               ................................................  Date                                   Time    ..........................................................................................................................................  Reviewed by Signature/Title    ...................................................              ..............................................  Date                                               Time          22EPIC Rev 08/18

## 2020-03-21 NOTE — DISCHARGE INSTRUCTIONS
Your lab work and CT scan were normal tonight which is reassuring.  You can use the Compazine as needed for nausea.  Keep your appointment with your colorectal surgeon as scheduled.  Return to the ED if worse/concerns.  It was nice visiting with again you tonight.   I am glad you are feeling better and hope you continue to improve.    Thank you for choosing [facility]. We appreciate the opportunity to meet your urgent medical needs. Please let us know if we could have done anything to make your stay more satisfying.    After discharge, please closely monitor for any new or worsening symptoms. Return to the Emergency Department if you develop any acute worsening signs or symptoms.    If you received an opiate pain medication or sedative during your visit, please do not drive for at least 8 hours.     If you had lab work, cultures or imaging studies done during your stay, the final results may still be pending. We will call you if your plan of care needs to change. However, if you are not improving as expected, please follow up with your primary care provider or clinic.     Start any prescription medications that were prescribed to you and take them as directed.     Please see additional handouts that may be pertinent to your condition.

## 2020-03-21 NOTE — ED TRIAGE NOTES
Complains of abdominal pain that started this morning, some rectal bleeding and some sore to the left side of her tongue.  Thinks it is crohns.  Needs sign language.

## 2020-03-21 NOTE — ED PROVIDER NOTES
History     Chief Complaint   Patient presents with     Abdominal Pain     HPI  Katy Islas is a 30 year old female who presents to the ED this evening with increasing abdominal pain.  She does have Crohn's disease and has had a perianal abscess drained in [facility] ED on 3/3/20 and placed on Augmentin.  She saw Dr. Alamo from general surgery the next day for recheck.  Things seemed to be going well at that time.  She was eventually switched from Augmentin over to sulfa and things have improved since then.  Drainage has finally settled down.     Now she has developed more abdominal pain especially in the left lower quadrant.  Has sores in her mouth and thinks she is having a Crohn's flare.  Denies any fevers chills or sweats.  It hurts to have a bowel movement.  Appetite has been decreased.    I do not these see that she has had imaging recently.  She is able to tolerate IV contrast if she is pretreated with Benadryl.    Katy is hearing impaired and  was used for the visit    Allergies:  Allergies   Allergen Reactions     Iohexol Hives     Other reaction(s): Hives  IV contrast; patient states she tolerates contrast if she gets benadryl before  IV contrast; patient states she tolerates contrast if she gets benadryl before     Ativan [Lorazepam] Hives     At the IV site     Baclofen      hives     Bees Hives, Swelling and Difficulty breathing     Caffeine      Contrast Dye      Hives,   Updated 5/10/2016 CT Contrast.     Iodine Hives     Methocarbamol Swelling     Metoclopramide      Other reaction(s): Tremors  LW Reaction: shaking/sweating     Midazolam      Other reaction(s): Agitation     Monosodium Glutamate      Morphine Other (See Comments)     Difficulty with urination     Nsaids Other (See Comments)     GI BLEED x2     Other (Do Not Use) Other (See Comments)     Xanaflex- pt becomes disoriented and loses bladder control     Reglan [Metoclopramide Hcl] Other (See Comments)     shaking      Soma [Carisoprodol] Visual Disturbance     Sleep walking     Tizanidine      Topamax Other (See Comments)     Topamax [Topiramate] Nausea     Tingling  GI/Vomit     Tramadol      Severe Headache, Seizure Risk     Tylenol W/Codeine [Acetaminophen-Codeine] Nausea and Itching     Tylenol 3     Versed Other (See Comments)     Zolmitriptan      Makes face feel like its twitching     Droperidol Anxiety     Flu Virus Vaccine Rash      Arm swelling       Problem List:    Patient Active Problem List    Diagnosis Date Noted     Morbid obesity (H) 03/10/2020     Priority: Medium     Abnormal uterine bleeding 02/12/2020     Priority: Medium     Added automatically from request for surgery [id number]       Anal fissure 02/04/2020     Priority: Medium     Low hematocrit 02/04/2020     Priority: Medium     Elevated d-dimer 02/04/2020     Priority: Medium     Hypertension goal BP (blood pressure) < 130/80 08/05/2019     Priority: Medium     Bulge of cervical disc without myelopathy 04/24/2019     Priority: Medium     Cervicalgia 04/17/2019     Priority: Medium     Class 1 obesity due to excess calories without serious comorbidity with body mass index (BMI) of 32.0 to 32.9 in adult 04/03/2019     Priority: Medium     Hypophosphatemia 11/01/2018     Priority: Medium     Patellar maltracking, right 09/05/2018     Priority: Medium     Right anterior knee pain 09/05/2018     Priority: Medium     Melanocytic nevus, unspecified location 07/23/2018     Priority: Medium     Dysplastic skin lesion 07/23/2018     Priority: Medium     Iliotibial band syndrome affecting lower leg, right 12/21/2017     Priority: Medium     Chondromalacia of right patellofemoral joint 12/21/2017     Priority: Medium     Upper GI bleed - suspected 07/01/2017     Priority: Medium     Tobacco use disorder 07/01/2017     Priority: Medium     History of pseudoseizure 07/01/2017     Priority: Medium     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) 03/10/2017      Priority: Medium     Lumbar disc disease with radiculopathy 02/23/2017     Priority: Medium     S/P lumbar fusion 02/23/2017     Priority: Medium     Dr. YOVANI Millan, 2/23/17       Vitamin D deficiency 01/06/2017     Priority: Medium     Atypical mole 01/06/2017     Priority: Medium     Mild persistent asthma without complication 12/02/2016     Priority: Medium     Chronic bilateral low back pain with left-sided sciatica 12/02/2016     Priority: Medium     Bee sting allergy 09/16/2016     Priority: Medium     Gastroesophageal reflux disease without esophagitis 08/26/2016     Priority: Medium     Herpes simplex virus infection 11/12/2015     Priority: Medium     Adjustment disorder with mixed anxiety and depressed mood 10/14/2015     Priority: Medium     Marijuana abuse 02/22/2015     Priority: Medium     Bipolar disorder (H) 01/31/2013     Priority: Medium     Problem list name updated by automated process. Provider to review       Chronic pain 02/09/2012     Priority: Medium     Patient is followed by Aura Rosario PA-C for ongoing prescription of pain medication.  All refills should only be approved by this provider, or covering partner.    Medication(s): Norco    Maximum quantity per month: 70  Clinic visit frequency required: Q 2 months     Controlled substance agreement:   Discussed and signed 4/25/17    Encounter-Level CSA - 01/12/2015:                 Controlled Substance Agreement - Scan on 1/26/2015  9:14 AM : Controlled Medication Agreement-01/12/15 (below)            Pain Clinic evaluation in the past: Yes       Date/Location:   [facility], fall 2016    DIRE Total Score(s):    4/25/2017   Total Score 17       Last [facility] website verification:  done on 4/25/17 by LOVE Maki   [email or web address]           Anxiety 09/22/2011     Priority: Medium     Mild major depression (H) 08/29/2011     Priority: Medium     Mild intermittent asthma 10/12/2009     Priority: Medium      Overview:   Formatting of this note might be different from the original.  Action plan: See letter 4/10/2013   ATAQ:   Asthma Data 4/10/2013   Date completed 4/10/2013   Missed daily activities no = 0   Wake at night no = 0   Believe asthma in control yes = 0   ARIN use yes   Maximum ARIN use in 1 day 1-4 = 0   ATAQ score 0 = well controlled   Asthma ED visits in past 12 mos 0   Asthma hospitalizations in past 12 mos 0     Current Outpatient Prescriptions   Medication Sig     albuterol (PROVENTIL) 0.083 % neb solution Inhale 3 mL via a nebulizer every 4 hours if needed for Shortness Of Breath.        Crohn's colitis (H) 08/04/2009     Priority: Medium     Dysmenorrhea 05/11/2007     Priority: Medium     Benign neoplasm of skin of lower limb, including hip 03/16/2004     Priority: Medium     Migraine      Priority: Medium     Dr. Farrar - Neurology.  Now on Inderal.  Seems to be working.  Follow-up 9/08  Problem list name updated by automated process. Provider to review       Hearing loss      Priority: Medium     Congenital  Problem list name updated by automated process. Provider to review       Allergic rhinitis      Priority: Medium     Problem list name updated by automated process. Provider to review          Past Medical History:    Past Medical History:   Diagnosis Date     Abdominal pain 10/31- 11/4/2005     Allergic rhinitis, cause unspecified      Arthritis      C. 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     COLONOSCOPY N/A 7/17/2019    Procedure: Colonoscopy, With Polypectomy And Biopsy;  Surgeon: Edwin Conde MD;  Location: [facility] OR     COLONOSCOPY WITH CO2 INSUFFLATION N/A 7/17/2019    Procedure: COLONOSCOPY, WITH CO2 INSUFFLATION;  Surgeon: Edwin Conde MD;  Location: [facility] OR     COMBINED ESOPHAGOSCOPY, GASTROSCOPY, DUODENOSCOPY (EGD) WITH CO2 INSUFFLATION N/A 4/12/2019    Procedure: COMBINED ESOPHAGOSCOPY, GASTROSCOPY, DUODENOSCOPY (EGD) WITH CO2 INSUFFLATION;  Surgeon: Edwin Conde MD;  Location: [facility] OR     ESOPHAGOSCOPY, GASTROSCOPY, DUODENOSCOPY (EGD), COMBINED N/A 4/12/2019    Procedure: Combined Esophagoscopy, Gastroscopy, Duodenoscopy (Egd), Biopsy Single Or Multiple;  Surgeon: Edwin Conde MD;  Location: [facility] OR     FUSION SPINE POSTERIOR MINIMALLY INVASIVE ONE LEVEL N/A 2/23/2017    Procedure: FUSION SPINE POSTERIOR MINIMALLY INVASIVE ONE LEVEL;  L4-5 Oblique Lateral Lumbar Interbody Fusion   Epidural steroid injection.   Transpedicular Bone marrow aspiration;  Surgeon: Jeniffer Eugene MD;  Location: [facility] OR     HC COLONOSCOPY THRU STOMA WITH BIOPSY  10/29/2003    Impression is that of normal appearing colonoscopy, without evidence of rectal bleeding.     HC COLONOSCOPY THRU STOMA, DIAGNOSTIC  10/00    normal     HC COLONOSCOPY THRU STOMA, DIAGNOSTIC  Oct 2009    Dr López- normal     HC EEG AWAKE AND SLEEP      abnormal     HC MRI BRAIN W/O CONTRAST  12/00    normal     HC REMOVAL GALLBLADDER  8/5/2009    [facility].     HC UGI ENDOSCOPY DIAG W  BIOPSY  11/11/09    Normal esophagus     HC UGI ENDOSCOPY, SIMPLE EXAM  7/00, 10/00    mild chronic esophagitis and duodenitis, neg H pylori     HC UGI ENDOSCOPY, SIMPLE EXAM  01/20/2005    Esophagogastroduodenoscopy, colonoscopy with biopsies.  [facility]     HC UGI ENDOSCOPY, SIMPLE EXAM  7/1/2009    [facility] [place]      UGI ENDOSCOPY, SIMPLE EXAM  11/11/2009    attempted upper GI, pt. could not tolerate procedure:[facility]     ORTHOPEDIC SURGERY  October 19,2011    diskectomy L4-L5       Family History:    Family History   Problem Relation Age of Onset     Gastrointestinal Disease Brother         severe Crohn's     Neurologic Disorder Brother         Seizures post head injury     Depression Brother      Substance Abuse Brother      Genitourinary Problems Father         kidney stones     Diabetes Father      Heart Disease Father         Open heart surgery     Breast Cancer Maternal Grandmother      Parkinsonism Maternal Grandmother      Cerebrovascular Disease Paternal Grandmother      Cancer Maternal Grandfather         Lung     Cardiovascular Paternal Grandfather         Heart Attack     Substance Abuse Mother         in recovery x1 year      Lung Cancer Paternal Uncle      Cancer Paternal Uncle      Lung Cancer Maternal Uncle        Social History:  Marital Status:  Single [1]  Social History     Tobacco Use     Smoking status: Current Every Day Smoker     Packs/day: 0.25     Years: 5.00     Pack years: 1.25     Types: Cigarettes     Smokeless tobacco: Never Used     Tobacco comment: due to illness    Substance Use Topics     Alcohol use: Not Currently     Comment: Not since last July 2018     Drug use: Yes     Types: Marijuana     Comment: Medical Marijuana currently-- More CBD        Medications:    prochlorperazine (COMPAZINE) 10 MG tablet  acetaminophen (TYLENOL) 500 MG tablet  albuterol (PROAIR HFA/PROVENTIL HFA/VENTOLIN HFA) 108 (90 Base) MCG/ACT  "inhaler  albuterol (PROVENTIL) (2.5 MG/3ML) 0.083% neb solution  alum & mag hydroxide-simethicone (MAALOX ADVANCED MAX ST) 400-400-40 MG/5ML SUSP suspension  ARIPiprazole (ABILIFY) 15 MG tablet  aspirin (ASA) 81 MG tablet  azelastine (OPTIVAR) 0.05 % ophthalmic solution  busPIRone (BUSPAR) 15 MG tablet  clindamycin (CLEOCIN) 150 MG capsule  clonazePAM (KLONOPIN) 0.5 MG ODT  cyclobenzaprine (FLEXERIL) 10 MG tablet  diphenhydrAMINE (BENADRYL) 50 MG capsule  DULoxetine (CYMBALTA) 60 MG EC capsule  EPINEPHrine (ANY BX GENERIC EQUIV) 0.3 MG/0.3ML injection 2-pack  fexofenadine (ALLEGRA) 180 MG tablet  gabapentin (NEURONTIN) 300 MG capsule  lidocaine (XYLOCAINE) 2 % solution  losartan (COZAAR) 100 MG tablet  medical cannabis (Patient's own supply)  medroxyPROGESTERone (DEPO-PROVERA) 150 MG/ML IM injection  methocarbamol (ROBAXIN) 750 MG tablet  metoprolol succinate ER (TOPROL-XL) 200 MG 24 hr tablet  neomycin-polymyxin-hydrocortisone (CORTISPORIN) 3.5-35113-2 otic suspension  NONFORMULARY  ondansetron (ZOFRAN-ODT) 8 MG ODT tab  pantoprazole (PROTONIX) 40 MG EC tablet  prazosin (MINIPRESS) 1 MG capsule  rizatriptan (MAXALT-MLT) 5 MG ODT  sucralfate (CARAFATE) 1 GM tablet  sulfamethoxazole-trimethoprim (BACTRIM DS) 800-160 MG tablet  valACYclovir (VALTREX) 1000 mg tablet          Review of Systems   All other systems reviewed and are negative.      Physical Exam   BP: (!) 130/93  Pulse: 77  Temp: 98.1  F (36.7  C)  Resp: 17  Height: 154.9 cm (5' 1\")  Weight: 86.2 kg (190 lb)  SpO2: 100 %      Physical Exam  Constitutional:       General: She is in acute distress (mild).      Appearance: She is well-developed.   Eyes:      Extraocular Movements: Extraocular movements intact.   Cardiovascular:      Rate and Rhythm: Normal rate and regular rhythm.   Pulmonary:      Effort: Pulmonary effort is normal.      Breath sounds: Normal breath sounds.   Abdominal:      General: Bowel sounds are normal.      Tenderness: There is " generalized abdominal tenderness (LLQ>rest).   Genitourinary:      Musculoskeletal: Normal range of motion.   Skin:     General: Skin is warm.   Neurological:      Mental Status: She is alert.      Cranial Nerves: Cranial nerve deficit (deaf, nothing acute) present.   Psychiatric:         Mood and Affect: Mood normal.         ED Course  (with Medical Decision Making)    30-year-old deaf female with underlying Crohn's disease with increasing abdominal pain especially left lower quadrant.  Had a perianal abscess drained in the ED back on 3/3/2020 and had follow-up with general surgery the next day and seemed to be doing okay.  She has had some persistent drainage from the abscess since then in spite of finishing a course of Augmentin.  She is felt nauseous but has had no vomiting.  It does hurt to have a bowel movement.  Denies any fevers chills or sweats.    IV placed.  Labs drawn.  Dilaudid for pain.  Zofran for nausea.  She has not had recent imaging so abdominal CT was ordered.  She will be pretreated with Benadryl as usual for her.      Looking back in her chart, the CT tech noted that she actually did have hives with IV contrast back in 2016 so we elected to do the CT without contrast.    CT fortunately came back normal showing no signs of inflammation, abscess or obstruction.    The perianal abscess area looks good.  She states it is finally stopped draining and since she is switch from Augmentin to sulfa, that has worked well for her.    She is feeling much better and feels ready to go home.  She was thankful that everything looked good and plans on following up with her colorectal surgeon the end of the month.       ED Course as of Mar 20 2323   Fri Mar 20, 2020   2234 White count normal at 4.7 with a normal differential.  Sed rate and CRP are normal.  Comprehensive profile unremarkable.  Lipase normal at 118.        Procedures               Critical Care time:  none               Results for orders placed or  performed during the hospital encounter of 03/20/20 (from the past 24 hour(s))   CBC with platelets differential   Result Value Ref Range    WBC 4.7 4.0 - 11.0 10e9/L    RBC Count 3.95 3.8 - 5.2 10e12/L    Hemoglobin 13.4 11.7 - 15.7 g/dL    Hematocrit 38.5 35.0 - 47.0 %    MCV 98 78 - 100 fl    MCH 33.9 (H) 26.5 - 33.0 pg    MCHC 34.8 31.5 - 36.5 g/dL    RDW 12.3 10.0 - 15.0 %    Platelet Count 298 150 - 450 10e9/L    Diff Method Automated Method     % Neutrophils 49.2 %    % Lymphocytes 36.7 %    % Monocytes 11.8 %    % Eosinophils 1.7 %    % Basophils 0.4 %    % Immature Granulocytes 0.2 %    Nucleated RBCs 0 0 /100    Absolute Neutrophil 2.3 1.6 - 8.3 10e9/L    Absolute Lymphocytes 1.7 0.8 - 5.3 10e9/L    Absolute Monocytes 0.6 0.0 - 1.3 10e9/L    Absolute Basophils 0.0 0.0 - 0.2 10e9/L    Abs Immature Granulocytes 0.0 0 - 0.4 10e9/L    Absolute Nucleated RBC 0.0    Comprehensive metabolic panel   Result Value Ref Range    Sodium 141 133 - 144 mmol/L    Potassium 3.8 3.4 - 5.3 mmol/L    Chloride 109 94 - 109 mmol/L    Carbon Dioxide 28 20 - 32 mmol/L    Anion Gap 4 3 - 14 mmol/L    Glucose 90 70 - 99 mg/dL    Urea Nitrogen 19 7 - 30 mg/dL    Creatinine 1.02 0.52 - 1.04 mg/dL    GFR Estimate 73 >60 mL/min/[1.73_m2]    GFR Estimate If Black 85 >60 mL/min/[1.73_m2]    Calcium 8.9 8.5 - 10.1 mg/dL    Bilirubin Total 0.2 0.2 - 1.3 mg/dL    Albumin 3.7 3.4 - 5.0 g/dL    Protein Total 7.3 6.8 - 8.8 g/dL    Alkaline Phosphatase 86 40 - 150 U/L    ALT 26 0 - 50 U/L    AST 17 0 - 45 U/L   Lipase   Result Value Ref Range    Lipase 118 73 - 393 U/L   CRP inflammation   Result Value Ref Range    CRP Inflammation <2.9 0.0 - 8.0 mg/L   Erythrocyte sedimentation rate auto   Result Value Ref Range    Sed Rate 13 0 - 20 mm/h   CT Abdomen Pelvis w/o Contrast    Narrative    EXAM: CT ABDOMEN PELVIS W/O CONTRAST  LOCATION: [facility]  DATE/TIME: 3/20/2020 10:20 PM    INDICATION: Abdominal pain. Crohn's. Recent  perirectal abscess.  COMPARISON: 12/06/2019  TECHNIQUE: CT scan of the abdomen and pelvis was performed without IV contrast. Multiplanar reformats were obtained. Dose reduction techniques were used.  CONTRAST: None.    FINDINGS:   LOWER CHEST: Lung bases clear.    HEPATOBILIARY: Cholecystectomy.    PANCREAS: Stable.    SPLEEN: Normal.    ADRENAL GLANDS: Normal.    KIDNEYS/BLADDER: Punctate right renal calculi again seen. Subcentimeter right renal hyperdensities small for characterization and incompletely characterized in the absence of IV contrast. These are presumably small hemorrhagic cysts given the appearance   on prior contrast study 01/26/2018.    BOWEL: Normal caliber.    LYMPH NODES: Normal.    VASCULATURE: Unremarkable.    PELVIC ORGANS: Normal.    MUSCULOSKELETAL: Postsurgical change L4-L5.      Impression    IMPRESSION:   1.  No inflammatory change, bowel obstruction or abscess. No visible perirectal abscess.  2.  Tiny nonobstructing right renal calculi.       *Note: Due to a large number of results and/or encounters for the requested time period, some results have not been displayed. A complete set of results can be found in Results Review.       Medications   0.9% sodium chloride BOLUS (0 mLs Intravenous Stopped 3/20/20 2318)     Followed by   sodium chloride 0.9% infusion (has no administration in time range)   HYDROmorphone (PF) (DILAUDID) injection 0.5 mg (0.5 mg Intravenous Given 3/20/20 2249)   ondansetron (ZOFRAN) injection 4 mg (4 mg Intravenous Given 3/20/20 2143)   diphenhydrAMINE (BENADRYL) injection 50 mg (50 mg Intravenous Given 3/20/20 2145)   prochlorperazine (COMPAZINE) injection 10 mg (10 mg Intravenous Given 3/20/20 2220)       Assessments & Plan     I have reviewed the nursing notes.    I have reviewed the findings, diagnosis, plan and need for follow up with the patient.       New Prescriptions    PROCHLORPERAZINE (COMPAZINE) 10 MG TABLET    Take 1 tablet (10 mg) by mouth every 6  hours as needed for nausea or vomiting       Final diagnoses:   Abdominal pain, generalized - more so in LLQ   Crohn's disease with abscess, unspecified gastrointestinal tract location (H) - perianal abscess resolving       3/20/2020   [facility] EMERGENCY DEPARTMENT     Nathan Swanson MD  03/20/20 8123

## 2020-03-22 ENCOUNTER — HOSPITAL ENCOUNTER (EMERGENCY)
Facility: CLINIC | Age: 30
Discharge: HOME | End: 2020-03-22
Attending: PHYSICIAN ASSISTANT | Admitting: PHYSICIAN ASSISTANT
Payer: MEDICARE

## 2020-03-22 VITALS
OXYGEN SATURATION: 98 % | SYSTOLIC BLOOD PRESSURE: 124 MMHG | TEMPERATURE: 99.5 F | DIASTOLIC BLOOD PRESSURE: 98 MMHG | RESPIRATION RATE: 16 BRPM

## 2020-03-22 DIAGNOSIS — K61.1 PERIRECTAL ABSCESS: ICD-10-CM

## 2020-03-22 PROCEDURE — 99284 EMERGENCY DEPT VISIT MOD MDM: CPT | Mod: Z6 | Performed by: PHYSICIAN ASSISTANT

## 2020-03-22 PROCEDURE — 25000128 H RX IP 250 OP 636: Performed by: PHYSICIAN ASSISTANT

## 2020-03-22 PROCEDURE — 99283 EMERGENCY DEPT VISIT LOW MDM: CPT | Performed by: PHYSICIAN ASSISTANT

## 2020-03-22 RX ORDER — ONDANSETRON 4 MG/1
8 TABLET, ORALLY DISINTEGRATING ORAL ONCE
Status: COMPLETED | OUTPATIENT
Start: 2020-03-22 | End: 2020-03-22

## 2020-03-22 RX ADMIN — ONDANSETRON 8 MG: 4 TABLET, ORALLY DISINTEGRATING ORAL at 17:23

## 2020-03-22 NOTE — ED NOTES
Patient rolled to the side and pillows placed under her for comfort. She is requesting Zofran for nausea.

## 2020-03-22 NOTE — ED PROVIDER NOTES
History     Chief Complaint   Patient presents with     Rectal Abscess      services present during evaluation and examination.     The history is provided by the patient. The history is limited by a language barrier.     Katy Islas is a 30 year old female who presents to the ED complaining of pain in the area of her rectal abscess and lower abdominal pain. Patient is receiving treatment of Sulfa for this since 3/20/20 ( see note below). Today she did not feel well, she was having pain in her rectum and lower abdomen with each bowel movement. She is unsure if there is drainage from that area, pain comes and goes. Patient was supposed to have an appt with the colorectal providers on 3/31 that was cancelled due to the covid-19 pandemic. She did not check her temp at home and she has not had diarrhea. Denies having any cold symptoms.     Excerpt from her ED visit on 3/20/20:     ED Course  (with Medical Decision Making)     30-year-old deaf female with underlying Crohn's disease with increasing abdominal pain especially left lower quadrant.  Had a perianal abscess drained in the ED back on 3/3/2020 and had follow-up with general surgery the next day and seemed to be doing okay.  She has had some persistent drainage from the abscess since then in spite of finishing a course of Augmentin.  She is felt nauseous but has had no vomiting.  It does hurt to have a bowel movement.  Denies any fevers chills or sweats.     IV placed.  Labs drawn.  Dilaudid for pain.  Zofran for nausea.  She has not had recent imaging so abdominal CT was ordered.  She will be pretreated with Benadryl as usual for her.        Looking back in her chart, the CT tech noted that she actually did have hives with IV contrast back in 2016 so we elected to do the CT without contrast.     CT fortunately came back normal showing no signs of inflammation, abscess or obstruction.     The perianal abscess area looks good.  She states it is  finally stopped draining and since she is switch from Augmentin to sulfa, that has worked well for her.     She is feeling much better and feels ready to go home.  She was thankful that everything looked good and plans on following up with her colorectal surgeon the end of the month.             ED Course as of Mar 20 2323   Fri Mar 20, 2020   2234 White count normal at 4.7 with a normal differential.  Sed rate and CRP are normal.  Comprehensive profile unremarkable.  Lipase normal at 118.         Procedures         Critical Care time:  none               Results for orders placed or performed during the hospital encounter of 03/20/20 (from the past 24 hour(s))   CBC with platelets differential   Result Value Ref Range     WBC 4.7 4.0 - 11.0 10e9/L     RBC Count 3.95 3.8 - 5.2 10e12/L     Hemoglobin 13.4 11.7 - 15.7 g/dL     Hematocrit 38.5 35.0 - 47.0 %     MCV 98 78 - 100 fl     MCH 33.9 (H) 26.5 - 33.0 pg     MCHC 34.8 31.5 - 36.5 g/dL     RDW 12.3 10.0 - 15.0 %     Platelet Count 298 150 - 450 10e9/L     Diff Method Automated Method       % Neutrophils 49.2 %     % Lymphocytes 36.7 %     % Monocytes 11.8 %     % Eosinophils 1.7 %     % Basophils 0.4 %     % Immature Granulocytes 0.2 %     Nucleated RBCs 0 0 /100     Absolute Neutrophil 2.3 1.6 - 8.3 10e9/L     Absolute Lymphocytes 1.7 0.8 - 5.3 10e9/L     Absolute Monocytes 0.6 0.0 - 1.3 10e9/L     Absolute Basophils 0.0 0.0 - 0.2 10e9/L     Abs Immature Granulocytes 0.0 0 - 0.4 10e9/L     Absolute Nucleated RBC 0.0     Comprehensive metabolic panel   Result Value Ref Range     Sodium 141 133 - 144 mmol/L     Potassium 3.8 3.4 - 5.3 mmol/L     Chloride 109 94 - 109 mmol/L     Carbon Dioxide 28 20 - 32 mmol/L     Anion Gap 4 3 - 14 mmol/L     Glucose 90 70 - 99 mg/dL     Urea Nitrogen 19 7 - 30 mg/dL     Creatinine 1.02 0.52 - 1.04 mg/dL     GFR Estimate 73 >60 mL/min/[1.73_m2]     GFR Estimate If Black 85 >60 mL/min/[1.73_m2]     Calcium 8.9 8.5 - 10.1 mg/dL      Bilirubin Total 0.2 0.2 - 1.3 mg/dL     Albumin 3.7 3.4 - 5.0 g/dL     Protein Total 7.3 6.8 - 8.8 g/dL     Alkaline Phosphatase 86 40 - 150 U/L     ALT 26 0 - 50 U/L     AST 17 0 - 45 U/L   Lipase   Result Value Ref Range     Lipase 118 73 - 393 U/L   CRP inflammation   Result Value Ref Range     CRP Inflammation <2.9 0.0 - 8.0 mg/L   Erythrocyte sedimentation rate auto   Result Value Ref Range     Sed Rate 13 0 - 20 mm/h   CT Abdomen Pelvis w/o Contrast     Narrative     EXAM: CT ABDOMEN PELVIS W/O CONTRAST  LOCATION: [facility]  DATE/TIME: 3/20/2020 10:20 PM     INDICATION: Abdominal pain. Crohn's. Recent perirectal abscess.  COMPARISON: 12/06/2019  TECHNIQUE: CT scan of the abdomen and pelvis was performed without IV contrast. Multiplanar reformats were obtained. Dose reduction techniques were used.  CONTRAST: None.     FINDINGS:   LOWER CHEST: Lung bases clear.     HEPATOBILIARY: Cholecystectomy.     PANCREAS: Stable.     SPLEEN: Normal.     ADRENAL GLANDS: Normal.     KIDNEYS/BLADDER: Punctate right renal calculi again seen. Subcentimeter right renal hyperdensities small for characterization and incompletely characterized in the absence of IV contrast. These are presumably small hemorrhagic cysts given the appearance   on prior contrast study 01/26/2018.     BOWEL: Normal caliber.     LYMPH NODES: Normal.     VASCULATURE: Unremarkable.     PELVIC ORGANS: Normal.     MUSCULOSKELETAL: Postsurgical change L4-L5.        Impression     IMPRESSION:   1.  No inflammatory change, bowel obstruction or abscess. No visible perirectal abscess.  2.  Tiny nonobstructing right renal calculi.         *Note: Due to a large number of results and/or encounters for the requested time period, some results have not been displayed. A complete set of results can be found in Results Review.        Medications   0.9% sodium chloride BOLUS (0 mLs Intravenous Stopped 3/20/20 2811)     Followed by   sodium chloride 0.9%  infusion (has no administration in time range)   HYDROmorphone (PF) (DILAUDID) injection 0.5 mg (0.5 mg Intravenous Given 3/20/20 2249)   ondansetron (ZOFRAN) injection 4 mg (4 mg Intravenous Given 3/20/20 2143)   diphenhydrAMINE (BENADRYL) injection 50 mg (50 mg Intravenous Given 3/20/20 2145)   prochlorperazine (COMPAZINE) injection 10 mg (10 mg Intravenous Given 3/20/20 2220)       Excerpt from her last general surgery office visit on 3/6/2020:       Assessment:      ICD-10-CM     1. Anal abscess K61.0       Plan: Everything looks like it should. She is at increased risk of developing an anal fistula due to her Crohn's disease but at this point it would be too early to say this isn't a simple abscess. If this should recur or there are continued wound healing issues she should follow up with her gastroenterologist and potentially a colorectal surgeon for EUA/evaluation for fistula.     Kirk Alamo, DO      Allergies:  Allergies   Allergen Reactions     Iohexol Hives     Other reaction(s): Hives  IV contrast; patient states she tolerates contrast if she gets benadryl before  IV contrast; patient states she tolerates contrast if she gets benadryl before     Ativan [Lorazepam] Hives     At the IV site     Baclofen      hives     Bees Hives, Swelling and Difficulty breathing     Caffeine      Contrast Dye      Hives,   Updated 5/10/2016 CT Contrast.     Iodine Hives     Methocarbamol Swelling     Metoclopramide      Other reaction(s): Tremors  LW Reaction: shaking/sweating     Midazolam      Other reaction(s): Agitation     Monosodium Glutamate      Morphine Other (See Comments)     Difficulty with urination     Nsaids Other (See Comments)     GI BLEED x2     Other (Do Not Use) Other (See Comments)     Xanaflex- pt becomes disoriented and loses bladder control     Reglan [Metoclopramide Hcl] Other (See Comments)     shaking     Soma [Carisoprodol] Visual Disturbance     Sleep walking     Tizanidine       Topamax Other (See Comments)     Topamax [Topiramate] Nausea     Tingling  GI/Vomit     Tramadol      Severe Headache, Seizure Risk     Tylenol W/Codeine [Acetaminophen-Codeine] Nausea and Itching     Tylenol 3     Versed Other (See Comments)     Zolmitriptan      Makes face feel like its twitching     Droperidol Anxiety     Flu Virus Vaccine Rash      Arm swelling       Problem List:    Patient Active Problem List    Diagnosis Date Noted     Morbid obesity (H) 03/10/2020     Priority: Medium     Abnormal uterine bleeding 02/12/2020     Priority: Medium     Added automatically from request for surgery [id number]       Anal fissure 02/04/2020     Priority: Medium     Low hematocrit 02/04/2020     Priority: Medium     Elevated d-dimer 02/04/2020     Priority: Medium     Hypertension goal BP (blood pressure) < 130/80 08/05/2019     Priority: Medium     Bulge of cervical disc without myelopathy 04/24/2019     Priority: Medium     Cervicalgia 04/17/2019     Priority: Medium     Class 1 obesity due to excess calories without serious comorbidity with body mass index (BMI) of 32.0 to 32.9 in adult 04/03/2019     Priority: Medium     Hypophosphatemia 11/01/2018     Priority: Medium     Patellar maltracking, right 09/05/2018     Priority: Medium     Right anterior knee pain 09/05/2018     Priority: Medium     Melanocytic nevus, unspecified location 07/23/2018     Priority: Medium     Dysplastic skin lesion 07/23/2018     Priority: Medium     Iliotibial band syndrome affecting lower leg, right 12/21/2017     Priority: Medium     Chondromalacia of right patellofemoral joint 12/21/2017     Priority: Medium     Upper GI bleed - suspected 07/01/2017     Priority: Medium     Tobacco use disorder 07/01/2017     Priority: Medium     History of pseudoseizure 07/01/2017     Priority: Medium     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) 03/10/2017     Priority: Medium     Lumbar disc disease with radiculopathy 02/23/2017      Priority: Medium     S/P lumbar fusion 02/23/2017     Priority: Medium     Dr. YOVANI Millan, 2/23/17       Vitamin D deficiency 01/06/2017     Priority: Medium     Atypical mole 01/06/2017     Priority: Medium     Mild persistent asthma without complication 12/02/2016     Priority: Medium     Chronic bilateral low back pain with left-sided sciatica 12/02/2016     Priority: Medium     Bee sting allergy 09/16/2016     Priority: Medium     Gastroesophageal reflux disease without esophagitis 08/26/2016     Priority: Medium     Herpes simplex virus infection 11/12/2015     Priority: Medium     Adjustment disorder with mixed anxiety and depressed mood 10/14/2015     Priority: Medium     Marijuana abuse 02/22/2015     Priority: Medium     Bipolar disorder (H) 01/31/2013     Priority: Medium     Problem list name updated by automated process. Provider to review       Chronic pain 02/09/2012     Priority: Medium     Patient is followed by Aura Rosario PA-C for ongoing prescription of pain medication.  All refills should only be approved by this provider, or covering partner.    Medication(s): Norco    Maximum quantity per month: 70  Clinic visit frequency required: Q 2 months     Controlled substance agreement:   Discussed and signed 4/25/17    Encounter-Level CSA - 01/12/2015:                 Controlled Substance Agreement - Scan on 1/26/2015  9:14 AM : Controlled Medication Agreement-01/12/15 (below)            Pain Clinic evaluation in the past: Yes       Date/Location:   [facility], fall 2016    DIRE Total Score(s):    4/25/2017   Total Score 17       Last [facility] website verification:  done on 4/25/17 by LOVE Maki   [email or web address]           Anxiety 09/22/2011     Priority: Medium     Mild major depression (H) 08/29/2011     Priority: Medium     Mild intermittent asthma 10/12/2009     Priority: Medium     Overview:   Formatting of this note might be different from the  original.  Action plan: See letter 4/10/2013   ATAQ:   Asthma Data 4/10/2013   Date completed 4/10/2013   Missed daily activities no = 0   Wake at night no = 0   Believe asthma in control yes = 0   ARIN use yes   Maximum ARIN use in 1 day 1-4 = 0   ATAQ score 0 = well controlled   Asthma ED visits in past 12 mos 0   Asthma hospitalizations in past 12 mos 0     Current Outpatient Prescriptions   Medication Sig     albuterol (PROVENTIL) 0.083 % neb solution Inhale 3 mL via a nebulizer every 4 hours if needed for Shortness Of Breath.        Crohn's colitis (H) 08/04/2009     Priority: Medium     Dysmenorrhea 05/11/2007     Priority: Medium     Benign neoplasm of skin of lower limb, including hip 03/16/2004     Priority: Medium     Migraine      Priority: Medium     Dr. Farrar - Neurology.  Now on Inderal.  Seems to be working.  Follow-up 9/08  Problem list name updated by automated process. Provider to review       Hearing loss      Priority: Medium     Congenital  Problem list name updated by automated process. Provider to review       Allergic rhinitis      Priority: Medium     Problem list name updated by automated process. Provider to review          Past Medical History:    Past Medical History:   Diagnosis Date     Abdominal pain 10/31- 11/4/2005     Allergic rhinitis, cause unspecified      Arthritis      C. 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     COLONOSCOPY N/A 7/17/2019    Procedure: Colonoscopy, With Polypectomy And Biopsy;  Surgeon: Edwin Conde MD;  Location: [facility] OR     COLONOSCOPY WITH CO2 INSUFFLATION N/A 7/17/2019    Procedure: COLONOSCOPY, WITH CO2 INSUFFLATION;  Surgeon: Edwin Conde MD;  Location: [facility] OR     COMBINED ESOPHAGOSCOPY, GASTROSCOPY, DUODENOSCOPY (EGD) WITH CO2 INSUFFLATION N/A 4/12/2019    Procedure: COMBINED ESOPHAGOSCOPY, GASTROSCOPY, DUODENOSCOPY (EGD) WITH CO2 INSUFFLATION;  Surgeon: Edwin Conde MD;  Location: [facility] OR     ESOPHAGOSCOPY, GASTROSCOPY, DUODENOSCOPY (EGD), COMBINED N/A 4/12/2019    Procedure: Combined Esophagoscopy, Gastroscopy, Duodenoscopy (Egd), Biopsy Single Or Multiple;  Surgeon: Edwin Conde MD;  Location: [facility] OR     FUSION SPINE POSTERIOR MINIMALLY INVASIVE ONE LEVEL N/A 2/23/2017    Procedure: FUSION SPINE POSTERIOR MINIMALLY INVASIVE ONE LEVEL;  L4-5 Oblique Lateral Lumbar Interbody Fusion   Epidural steroid injection.   Transpedicular Bone marrow aspiration;  Surgeon: Jeniffer Eugene MD;  Location: [facility] OR     HC COLONOSCOPY THRU STOMA WITH BIOPSY  10/29/2003    Impression is that of normal appearing colonoscopy, without evidence of rectal bleeding.     HC COLONOSCOPY THRU STOMA, DIAGNOSTIC  10/00    normal     HC COLONOSCOPY THRU STOMA, DIAGNOSTIC  Oct 2009    Dr López- normal     HC EEG AWAKE AND SLEEP      abnormal     HC MRI BRAIN W/O CONTRAST  12/00    normal     HC REMOVAL GALLBLADDER  8/5/2009    [facility].     HC UGI ENDOSCOPY DIAG W BIOPSY  11/11/09    Normal esophagus     HC UGI ENDOSCOPY, SIMPLE EXAM   7/00, 10/00    mild chronic esophagitis and duodenitis, neg H pylori      UGI ENDOSCOPY, SIMPLE EXAM  01/20/2005    Esophagogastroduodenoscopy, colonoscopy with biopsies.  [facility] UGI ENDOSCOPY, SIMPLE EXAM  7/1/2009    [facility].      UGI ENDOSCOPY, SIMPLE EXAM  11/11/2009    attempted upper GI, pt. could not tolerate procedure:[facility]     ORTHOPEDIC SURGERY  October 19,2011    diskectomy L4-L5       Family History:    Family History   Problem Relation Age of Onset     Gastrointestinal Disease Brother         severe Crohn's     Neurologic Disorder Brother         Seizures post head injury     Depression Brother      Substance Abuse Brother      Genitourinary Problems Father         kidney stones     Diabetes Father      Heart Disease Father         Open heart surgery     Breast Cancer Maternal Grandmother      Parkinsonism Maternal Grandmother      Cerebrovascular Disease Paternal Grandmother      Cancer Maternal Grandfather         Lung     Cardiovascular Paternal Grandfather         Heart Attack     Substance Abuse Mother         in recovery x1 year      Lung Cancer Paternal Uncle      Cancer Paternal Uncle      Lung Cancer Maternal Uncle        Social History:  Marital Status:  Single [1]  Social History     Tobacco Use     Smoking status: Current Every Day Smoker     Packs/day: 0.25     Years: 5.00     Pack years: 1.25     Types: Cigarettes     Smokeless tobacco: Never Used     Tobacco comment: due to illness    Substance Use Topics     Alcohol use: Not Currently     Comment: Not since last July 2018     Drug use: Yes     Types: Marijuana     Comment: Medical Marijuana currently-- More CBD        Medications:    acetaminophen (TYLENOL) 500 MG tablet  albuterol (PROAIR HFA/PROVENTIL HFA/VENTOLIN HFA) 108 (90 Base) MCG/ACT inhaler  albuterol (PROVENTIL) (2.5 MG/3ML) 0.083% neb solution  alum & mag hydroxide-simethicone (MAALOX ADVANCED MAX ST)  400-400-40 MG/5ML SUSP suspension  ARIPiprazole (ABILIFY) 15 MG tablet  aspirin (ASA) 81 MG tablet  azelastine (OPTIVAR) 0.05 % ophthalmic solution  busPIRone (BUSPAR) 15 MG tablet  clindamycin (CLEOCIN) 150 MG capsule  clonazePAM (KLONOPIN) 0.5 MG ODT  cyclobenzaprine (FLEXERIL) 10 MG tablet  diphenhydrAMINE (BENADRYL) 50 MG capsule  DULoxetine (CYMBALTA) 60 MG EC capsule  EPINEPHrine (ANY BX GENERIC EQUIV) 0.3 MG/0.3ML injection 2-pack  fexofenadine (ALLEGRA) 180 MG tablet  gabapentin (NEURONTIN) 300 MG capsule  lidocaine (XYLOCAINE) 2 % solution  losartan (COZAAR) 100 MG tablet  medical cannabis (Patient's own supply)  medroxyPROGESTERone (DEPO-PROVERA) 150 MG/ML IM injection  methocarbamol (ROBAXIN) 750 MG tablet  metoprolol succinate ER (TOPROL-XL) 200 MG 24 hr tablet  neomycin-polymyxin-hydrocortisone (CORTISPORIN) 3.5-62669-4 otic suspension  NONFORMULARY  ondansetron (ZOFRAN-ODT) 8 MG ODT tab  pantoprazole (PROTONIX) 40 MG EC tablet  prazosin (MINIPRESS) 1 MG capsule  prochlorperazine (COMPAZINE) 10 MG tablet  rizatriptan (MAXALT-MLT) 5 MG ODT  sucralfate (CARAFATE) 1 GM tablet  sulfamethoxazole-trimethoprim (BACTRIM DS) 800-160 MG tablet  valACYclovir (VALTREX) 1000 mg tablet          Review of Systems   All other systems reviewed and are negative.      Physical Exam   BP: (!) 124/98  Temp: 99.5  F (37.5  C)  Resp: 16  SpO2: 98 %      Physical Exam  Vitals signs and nursing note reviewed. Exam conducted with a chaperone present.   Constitutional:       General: She is not in acute distress.     Appearance: She is not diaphoretic.   HENT:      Head: Normocephalic and atraumatic.      Right Ear: External ear normal.      Left Ear: External ear normal.      Nose: Nose normal.      Mouth/Throat:      Pharynx: No oropharyngeal exudate.   Eyes:      General: No scleral icterus.        Right eye: No discharge.         Left eye: No discharge.      Conjunctiva/sclera: Conjunctivae normal.      Pupils: Pupils are  equal, round, and reactive to light.   Neck:      Musculoskeletal: Normal range of motion and neck supple.      Thyroid: No thyromegaly.   Cardiovascular:      Rate and Rhythm: Normal rate and regular rhythm.      Heart sounds: Normal heart sounds. No murmur.   Pulmonary:      Effort: Pulmonary effort is normal. No respiratory distress.      Breath sounds: Normal breath sounds. No wheezing or rales.   Chest:      Chest wall: No tenderness.   Abdominal:      General: Bowel sounds are normal. There is no distension.      Palpations: Abdomen is soft. There is no mass.      Tenderness: There is abdominal tenderness (minor in the suprapubic area). There is no right CVA tenderness, left CVA tenderness, guarding or rebound.   Genitourinary:     Rectum: Tenderness present. No mass or external hemorrhoid. Normal anal tone.      Comments: Small skin abrasion/seizure about 2 cm from the rectum at the 12 o'clock position which causes a lot of discomfort with pressure of the area.  There is no induration or fluctuance anywhere around the rectum.  There is some stool present around the rectum.  No sign of recurrent abscess.  Nothing that requires incision and drainage.  Musculoskeletal: Normal range of motion.         General: No tenderness or deformity.   Lymphadenopathy:      Cervical: No cervical adenopathy.   Skin:     General: Skin is warm and dry.      Capillary Refill: Capillary refill takes less than 2 seconds.      Findings: No erythema or rash.   Neurological:      Mental Status: She is alert and oriented to person, place, and time.      Cranial Nerves: No cranial nerve deficit.   Psychiatric:         Behavior: Behavior normal.         Thought Content: Thought content normal.         ED Course        Procedures               Critical Care time:  none               No results found. However, due to the size of the patient record, not all encounters were searched. Please check Results Review for a complete set of  results.    Medications   ondansetron (ZOFRAN-ODT) ODT tab 8 mg (8 mg Oral Given 3/22/20 1723)       Assessments & Plan (with Medical Decision Making)  Perirectal abscess     30 year old female with history of perirectal abscess previously treated with incision and drainage and antibiotic therapy initially with Augmentin and then changed to Bactrim.  She has had to general surgery visits for this concern with documented healing.  She had a colorectal surgery consultation scheduled for last week, but this was canceled due to the coronavirus.  Rescheduled in the next couple weeks.  She is concerned that the abscess is returning given continued discomfort in the rectal area.  Rectal discomfort is worse with passing a bowel movement.  She does have Crohn's disease but really has not noted any blood or pus in the stool.  No black stools.  She questions if there is blood on the stool one time, but cannot confirm that.  She has had persistent suprapubic abdominal discomfort for the last week, but unchanged in the past 2 days.  She underwent a full work-up for this in the ED 2 days ago and had reassuring laboratory results and a normal CT.  She denies any urinary symptoms.  No fevers or chills.  On exam she is afebrile with a temperature of 99.5.  She has minor discomfort in the suprapubic area but no rebound or guarding.  Reassuring exam.  Rectal exam performed with RN present.  She has a small superficial abrasion or anal fissure 2 cm from the rectum at the 12 o'clock position which is very tender to palpation.  There is stool in the rectal area but no drainage.  No abscess identified.  No induration or fluctuance.  Patient was reassured.  It does not appear that the abscess is returning at all.  She still has a very small open area which could just be related to the healing process of the incision and drainage procedure or it could be a small anal fissure.  Conservative care measures discussed.  Use Aquaphor ointment  over the area for a barrier.  Sitz baths recommended.  We discussed this in detail with patient.  She felt reassured that there is not a current abscess, and that was her primary concern for being seen today.  She will keep her colorectal surgery appointment for ongoing management given her history of Crohn's disease.  Indication for ED return reviewed with the patient.  She is in agreement with this plan and was suitable for discharge.     I have reviewed the nursing notes.    I have reviewed the findings, diagnosis, plan and need for follow up with the patient.       Discharge Medication List as of 3/22/2020  5:35 PM              Final diagnoses:   Perirectal abscess - healing with minor superficial skin irritation   This document serves as a record of services personally performed by Tomas De La Paz PA.  It was created on their behalf by Zita Porter, a trained medical scribe. The creation of this record is based on the provider's personal observations and the statements of the patient. This document has been checked and approved by the attending provider.    Disclaimer : This note consists of symbols derived from keyboarding, dictation and/or voice recognition software. As a result, there may be errors in the script that have gone undetected. Please consider this when interpreting information found in this chart.      3/22/2020   Tomas De La Paz PA-C   [facility] EMERGENCY DEPARTMENT     Tomas De La Paz PA-C  03/22/20 0988

## 2020-03-22 NOTE — ED AVS SNAPSHOT
[facility] Emergency Department  [address] [place] [address]  Phone:  [phone]  Fax:  [phone]                                    Katy Islas   MRN: [id number]    Department:  [facility] Emergency Department   Date of Visit:  3/22/2020           After Visit Summary Signature Page    I have received my discharge instructions, and my questions have been answered. I have discussed any challenges I see with this plan with the nurse or doctor.    ..........................................................................................................................................  Patient/Patient Representative Signature      ..........................................................................................................................................  Patient Representative Print Name and Relationship to Patient    ..................................................               ................................................  Date                                   Time    ..........................................................................................................................................  Reviewed by Signature/Title    ...................................................              ..............................................  Date                                               Time          22EPIC Rev 08/18

## 2020-03-22 NOTE — ED TRIAGE NOTES
Patient here because her rectal abscess is draining and she wanted it looked at. She is already taking Sulfa at home for infection.

## 2020-03-22 NOTE — DISCHARGE INSTRUCTIONS
Thankfully, your perirectal abscess is almost completely healed.  You just have a very small superficial skin irritation area on the backside of the rectum that is causing your irritation when you have a bowel movement.  Please Place Aquaphor ointment on this every 2-3 hours during the day and especially prior to having a bowel movement so that you do not continue to irritate this area.  The Aquaphor ointment provides a barrier.  With using the Aquaphor ointment, I would expect that this skin irritation should heal in the next 5 days.

## 2020-03-23 ENCOUNTER — TELEPHONE (OUTPATIENT)
Dept: FAMILY MEDICINE | Facility: OTHER | Age: 30
End: 2020-03-23

## 2020-03-23 ENCOUNTER — PATIENT OUTREACH (OUTPATIENT)
Dept: CARE COORDINATION | Facility: CLINIC | Age: 30
End: 2020-03-23

## 2020-03-23 ENCOUNTER — MYC REFILL (OUTPATIENT)
Dept: FAMILY MEDICINE | Facility: OTHER | Age: 30
End: 2020-03-23

## 2020-03-23 ENCOUNTER — E-VISIT (OUTPATIENT)
Dept: FAMILY MEDICINE | Facility: OTHER | Age: 30
End: 2020-03-23
Payer: MEDICARE

## 2020-03-23 DIAGNOSIS — F31.63 BIPOLAR DISORDER, CURRENT EPISODE MIXED, SEVERE, WITHOUT PSYCHOTIC FEATURES (H): ICD-10-CM

## 2020-03-23 DIAGNOSIS — G89.29 CHRONIC BILATERAL LOW BACK PAIN WITH LEFT-SIDED SCIATICA: ICD-10-CM

## 2020-03-23 DIAGNOSIS — M54.42 CHRONIC BILATERAL LOW BACK PAIN WITH LEFT-SIDED SCIATICA: ICD-10-CM

## 2020-03-23 DIAGNOSIS — F32.0 MILD MAJOR DEPRESSION (H): Primary | ICD-10-CM

## 2020-03-23 DIAGNOSIS — F43.23 ADJUSTMENT DISORDER WITH MIXED ANXIETY AND DEPRESSED MOOD: ICD-10-CM

## 2020-03-23 PROCEDURE — 99423 OL DIG E/M SVC 21+ MIN: CPT | Performed by: PHYSICIAN ASSISTANT

## 2020-03-23 RX ORDER — GABAPENTIN 300 MG/1
300-900 CAPSULE ORAL 3 TIMES DAILY PRN
Qty: 270 CAPSULE | Refills: 3 | Status: ON HOLD | OUTPATIENT
Start: 2020-03-23 | End: 2020-04-19

## 2020-03-23 ASSESSMENT — ANXIETY QUESTIONNAIRES
1. FEELING NERVOUS, ANXIOUS, OR ON EDGE: MORE THAN HALF THE DAYS
GAD7 TOTAL SCORE: 10
GAD7 TOTAL SCORE: 10
4. TROUBLE RELAXING: MORE THAN HALF THE DAYS
7. FEELING AFRAID AS IF SOMETHING AWFUL MIGHT HAPPEN: NOT AT ALL
3. WORRYING TOO MUCH ABOUT DIFFERENT THINGS: SEVERAL DAYS
2. NOT BEING ABLE TO STOP OR CONTROL WORRYING: SEVERAL DAYS
7. FEELING AFRAID AS IF SOMETHING AWFUL MIGHT HAPPEN: NOT AT ALL
6. BECOMING EASILY ANNOYED OR IRRITABLE: MORE THAN HALF THE DAYS
5. BEING SO RESTLESS THAT IT IS HARD TO SIT STILL: MORE THAN HALF THE DAYS
GAD7 TOTAL SCORE: 10

## 2020-03-23 ASSESSMENT — ACTIVITIES OF DAILY LIVING (ADL): DEPENDENT_IADLS:: INDEPENDENT

## 2020-03-23 ASSESSMENT — PATIENT HEALTH QUESTIONNAIRE - PHQ9
SUM OF ALL RESPONSES TO PHQ QUESTIONS 1-9: 4
10. IF YOU CHECKED OFF ANY PROBLEMS, HOW DIFFICULT HAVE THESE PROBLEMS MADE IT FOR YOU TO DO YOUR WORK, TAKE CARE OF THINGS AT HOME, OR GET ALONG WITH OTHER PEOPLE: SOMEWHAT DIFFICULT
SUM OF ALL RESPONSES TO PHQ QUESTIONS 1-9: 4

## 2020-03-23 NOTE — TELEPHONE ENCOUNTER
Reason for Call:  Medication or medication refill: Medication     Do you use a [facility]?  Name of the pharmacy and phone number for the current request:  [facility] [address] [place]     Name of the medication requested: clonazePAM (KLONOPIN) 0.5 MG ODT     Other request: Patient stating that she has been on Clonazepam for a week and a half and it is not really helping. Please give patient a call back and advise   Can we leave a detailed message on this number? YES    Phone number patient can be reached at: Cell number on file:    Telephone Information:   Mobile [phone]       Best Time: Anytime     Call taken on 3/23/2020 at 1:54 PM by Vanesa Linn

## 2020-03-23 NOTE — PROGRESS NOTES
Clinic Care Coordination Contact  [facility]    Referral Source: ED Follow-Up  Clinical Data: Care Coordinator Outreach  Outreach attempted x 1. Call went to relay service, however there was no answer and no option to leave a VM.   Plan: Care Coordinator will try to reach patient again in 1-2 business days.  EDYTA OttoN RN Clinic Care Coordinator   [place], [place], [place]  Phone: [phone]

## 2020-03-23 NOTE — TELEPHONE ENCOUNTER
We advised patient to do an E-visit for her mood.   But I also advise she reach out to her psychiatry team to see if they will do phone or video/virtual visit for her.     Zach Rosario PA-C  [facility]

## 2020-03-23 NOTE — TELEPHONE ENCOUNTER
Provider E-Visit time total (minutes): 30 min    JULIETH-7 SCORE 2/4/2020 2/19/2020 3/23/2020   Total Score - - -   Total Score 16 (severe anxiety) 2 (minimal anxiety) 10 (moderate anxiety)   Total Score 16 2 10       PHQ 2/4/2020 2/19/2020 3/23/2020   PHQ-9 Total Score 6 4 4   Q9: Thoughts of better off dead/self-harm past 2 weeks Not at all Not at all Not at all         Patient message from today  Patient stating that she has been on Clonazepam for a week and a half and it is not really helping. Please give patient a call back and advise     Buspar 15 mg BID  Cymbalta 60 mg once daily   Gabapentin   Prazosin 1 mg       Patient message  Hi. I did talk to dr Rosario s office and I know that my surgery is postponed until further notice. I still want to know why I ve been hurting bad lately. My legs and feet hurt bad and some says it hurts too much to walk. My back and legs feel inflamed and can feel nerves twitching. I hate it. It makes me angry, emotional, fed up. I ve been getting infections often. We need to find out what s wrong. Why am I always hurting.... is it my fibromyalgia?  I can t do this anymore. I m done. I think going to [place] is a good idea.  I do not want to be on opioid pain medication. We need to get to the bottom of this. I had to put down my horse on my [birth year], [birth year]. Some days, I m fine , and some days I m crying and emotional. Klonopin doesn t help. Would Xanax help short term?  My mom asked. I m unable to see my psychiatrist and therapist because of the corona virus.  Im not sure what to do. Also when was my last depo?  It feels like my period is coming.          Plan from last visit 3/10/20   - Patient reports so much anxiety due to upcoming surgery, she had first intake with psychiatry, goes to weekly counseling       Dr. Renata Lucas MD [facility] - hearing but signs      [facility] - she is deaf       Was told no medication changes until after surgery      Continues on Minipress, Abilify, and Cymbalta         Could go up on any of these but will not change and allow psychiatry to do this       Patient is requesting something short term to get her through until surgery in 2 weeks, just so she can sleep   - Failed on Vistaril in the past   - States Klonopin worked well     Will give #14 to get her through, advised of use and side effects including sedation and no driving on this medication  -  reviewed, no concerns          Zach Tomlinson-LOVE Moss  [facility]

## 2020-03-23 NOTE — TELEPHONE ENCOUNTER
Have patient do an E-visit. Also someone can answer her question about her Depo please.     Zach Rosario PA-C  [facility]

## 2020-03-23 NOTE — TELEPHONE ENCOUNTER
Pending Prescriptions:                       Disp   Refills    gabapentin (NEURONTIN) 300 MG capsule      270 ca*0          Last Written Prescription Date:  1/3/20  Last Fill Quantity: 270,  # refills: 0   Last office visit: 3/10/2020 with prescribing provider:     Future Office Visit:      Routing refill request to provider for review/approval because:  Drug not on the [facility] refill protocol     Claudine Jimenez, MSN, RN

## 2020-03-23 NOTE — TELEPHONE ENCOUNTER
Elsat sent     Depo NEXT INJECTION DUE: 4/8/20 - 4/22/20    Zach Rosario PA-C  [facility] ER

## 2020-03-24 RX ORDER — CLONAZEPAM 1 MG/1
1 TABLET ORAL 2 TIMES DAILY PRN
Qty: 14 TABLET | Refills: 0 | Status: SHIPPED | OUTPATIENT
Start: 2020-03-24 | End: 2020-03-30

## 2020-03-24 RX ORDER — BUPROPION HYDROCHLORIDE 150 MG/1
150 TABLET ORAL EVERY MORNING
Qty: 30 TABLET | Refills: 0 | Status: SHIPPED | OUTPATIENT
Start: 2020-03-24 | End: 2020-04-14

## 2020-03-24 ASSESSMENT — PATIENT HEALTH QUESTIONNAIRE - PHQ9: SUM OF ALL RESPONSES TO PHQ QUESTIONS 1-9: 4

## 2020-03-24 ASSESSMENT — ANXIETY QUESTIONNAIRES: GAD7 TOTAL SCORE: 10

## 2020-03-24 NOTE — PATIENT INSTRUCTIONS
"    Warning Signs of Suicide and What You Can Do    If you think a person could be suicidal, ask, \"Have you thought about suicide?\" If they say \"yes,\" they may already have a plan for how and when they will attempt it. Find out as much as you can. The more detailed the plan, and the easier it is to carry out, the more danger the person is in right now.  Know the warning signs  The warning signs for suicide include:    Threats or talk of suicide    Sense of hopelessness    Buying a gun or other weapon    Statements such as \"Soon, I won't be a problem\" or \"Nothing matters\"    Giving away items they own, making out a will, or planning their     Suddenly being happy or calm after being depressed  Factors that put a person at a higher risk of attempting suicide include:    A history of suicide in the person's family    Previous suicide attempts    Alcohol and drug use, along with impulsive behaviors    Having a diagnose mood disorder such as depression or bipolar disorder    History of trauma or abuse including bullying    Significant losses such as a divorce, death of a loved one, financial problems, or legal problems    Having access to a lethal weapon (for example firearms in the home)    Chronic physical illnesses, including chronic pain    Exposure to suicidal behavior of others  Get help  Don't try to handle this alone. You can be the most help by getting the person to a trained professional. Suicidal thinking may be a sign of depression, a serious but treatable illness.  In an emergency--call 911  Don't leave the person alone. Anyone who is at imminent risk of suicide needs psychiatric services right away. The person must be continuously monitored, and never left out of sight. Call 911 or a 24-hour suicide crisis hotline. It can be found in the white pages of your phone book under \"Suicide.\" You can also take the person to the nearest hospital emergency room (ER).  Don't keep it a secret and don't " wait  Call a mental health clinic or a licensed mental health professional in your area right away: a psychiatrist, clinical psychologist, psychiatric or licensed clinical , marriage and family counselor, or clergy. Tell them you need help for a person who is thinking about suicide.  Resources    National Suicide Prevention Dbvxeiah491-107-6487 (526-429-GKQN)www.suicidepreventionlifeline.org    National Suicide Swfxspr597-550-7779 (800-SUICIDE)    National [place] of Mental Fynwxo164-055-6827xoh.[facility].nih.gov    National [place] on Mental Rlvjaoj968-143-0230uhg.geoff.org    Mental Health Zpuhviz396-088-8485qkx.nmha.org   Date Last Reviewed: 1/1/2017 2000-2019 [employer]. [address] [place] [address]. All rights reserved. This information is not intended as a substitute for professional medical care. Always follow your healthcare professional's instructions.          Using Antidepressants  Depression is a mood disorder that affects the way you think and feel. The most common symptom is a feeling of deep sadness. This feeling does not go away or get better on its own. But most types of depression can be helped with therapy and antidepressant medicines. (Note: This covers antidepressant use in adults only.)    What do antidepressants do?  Antidepressants restore the balance of certain chemicals in your brain to help ease your depression. You will likely feel better in 4 to 6 weeks. But you may continue taking antidepressants for a year or more to keep your symptoms from coming back. Some people with depression need to take antidepressants for life. There are several types of antidepressants. The main types are described below.  Selective serotonin reuptake inhibitors (SSRIs)  SSRIs are effective medicines for the treatment of depression. They tend to have fewer side effects than other antidepressants. Possible side effects include anxiety, trouble sleeping, nausea, diarrhea,  sexual dysfunction, and headaches. In rare cases, they may make you more depressed. SSRIs shouldn t be mixed with certain other medicines. Talk with your healthcare provider about all the medicines, herbs, and supplements you are taking.  Tricyclic antidepressants  Tricyclics help severe or long-term depression. They have been used for many years with good results. Possible side effects include blurred vision, dry mouth, and constipation.  Monoamine oxidase inhibitors (MAOIs)  If you don t respond to tricyclics or SSRIs, your healthcare provider may prescribe MAOIs. These medicines can be very effective. But people taking MAOIs must stay away from certain foods and medicines. Your healthcare provider can tell you more.  Lithium  If you have bipolar disorder, you may take a medicine called lithium. This medicine helps even out your mood. Possible side effects are weight gain, trembling, loose stool, and nausea. Lithium is also used:    For people who have unipolar depression and have not responded to other antidepressants    For people who have a sudden (acute) episode of unipolar depression    As a maintenance medicine to prevent unipolar depression from happening again  Things to avoid if you are taking MAOIs  If you are taking MAOIs, don t take any of the following:    Beans    Aged cheese    Chocolate    Red wine    Most cold medicines    Certain medicines (ask your healthcare provider)  To reduce the risk of lithium poisoning  You can reduce the risk of lithium poisoning by following this advice:    Take only the prescribed amount of lithium. If your depressive symptoms get worse, contact your healthcare provider. Never increase or decrease your medicine on your own.    Drink plenty of fluids other than coffee, tea, and soda.    Limit salt in your diet.    Before using other prescribed medicines or over-the-counter medicines, check with your pharmacist. This is to be sure the medicines won t interact with the  lithium.    Never share your medicines or use another person's medicines, even if it is the same medicine and dose.    Keep follow-up appointments    Have your Lithium blood level checked as advised. Blood work will need to occur more often when symptoms are not under control  If you have side effects  The side effects of antidepressants are usually mild. But if you have troubling side effects, call your healthcare provider. Changing the dosage or type of medicine may help. Never stop taking medicines on your own.  Date Last Reviewed: 5/1/2017 2000-2019 "[employer]. [address] [place] [address]. All rights reserved. This information is not intended as a substitute for professional medical care. Always follow your healthcare professional's instructions.          Depression: Tips to Help Yourself    As your healthcare providers help treat your depression, you can also help yourself. Keep in mind that your illness affects you emotionally, physically, mentally, and socially. So full recovery will take time. Take care of your body and your soul, and be patient with yourself as you get better.  Self-care    Educate yourself. Read about treatment and medicine options. If you have the energy, attend local conferences or support groups. Keep a list of useful websites and helpful books and use them as needed. This illness is not your fault. Don t blame yourself for your depression.    Manage early symptoms. If you notice symptoms returning, experience triggers, or identify other factors that may lead to a depressive episode, get help as soon as possible. Ask trusted friends and family to monitor your behavior and let you know if they see anything of concern.    Work with your provider. Find a provider you can trust. Communicate honestly with that person and share information on your treatment for depression and your reaction to medicines.    Be prepared for a crisis. Know what to do if you  experience a crisis. Keep the phone number of a crisis hotline and know the location of your community's urgent care centers and the closest emergency department.    Hold off on big decisions. Depression can cloud your judgment. So wait until you feel better before making major life decisions, such as changing jobs, moving, or getting  or .    Be patient. Recovering from depression is a process. Don t be discouraged if it takes some time to feel better.    Keep it simple. Depression saps your energy and concentration. So you won t be able to do all the things you used to do. Set small goals and do what you can.    Be with others. Don t isolate yourself--you ll only feel worse. Try to be with other people. And take part in fun activities when you can. Go to a movie, ballgame, Sikhism service, or social event. Talk openly with people you can trust. And accept help when it s offered.  Take care of your body  People with depression often lose the desire to take care of themselves. That only makes their problems worse. During treatment and afterward, make a point to:    Exercise. It s a great way to take care of your body. And studies have shown that exercise helps fight depression.    Avoid drugs and alcohol. These may ease the pain in the short term. But they ll only make your problems worse in the long run.    Get relief from stress. Ask your healthcare provider for relaxation exercises and techniques to help relieve stress.    Eat right. A balanced and healthy diet helps keep your body healthy.  Date Last Reviewed: 1/1/2017 2000-2019 [employer]. [address] [place] [address], [place] [address]. All rights reserved. This information is not intended as a substitute for professional medical care. Always follow your healthcare professional's instructions.          Depression Affects Your Mind and Body  Everyone feels sad or  blue  from time to time for a few days or weeks. Depression is when  these feelings don't go away and they interfere with daily life.  Depression is a real illness that can develop at any age. It is one of the most common mental health problems in the [place] Depression makes you feel sad, helpless, and hopeless. It gets in the way of your life and relationships. It inhibits your ability to think and act. But, with help, you can feel better again.      When I was depressed, I felt awful. I was so tired all the time I could hardly think, but at night I couldn t fall asleep. My head hurt. My stomach hurt. I didn t know what was wrong with me.    Depression affects your whole body  Brain chemicals affect your body as well as your mood. So depression may do more than just make you feel low. You may also feel bad physically. Depression can:    Cause trouble with mental tasks such as remembering, concentrating, or making decisions    Make you feel nervous and jumpy    Cause trouble sleeping. Or you may sleep too much    Change your appetite    Cause headaches, stomachaches, or other aches and pains    Drain your body of energy  Depression and other illness  It is common for people who have chronic health problems to also have depression. It can often be hard to tell which one caused the other. A person might become depressed after finding out they have a health problem. But some studies suggest being depressed may make certain health problems more likely. And some depressed people stop taking care of themselves. This may make them more likely to get sick.  Date Last Reviewed: 1/1/2017 2000-2019 [employer]. [address] [place] [address], [place] [address]. All rights reserved. This information is not intended as a substitute for professional medical care. Always follow your healthcare professional's instructions.

## 2020-03-24 NOTE — PROGRESS NOTES
Clinic Care Coordination Contact    Called patient, however, she had limited time to talk. She asked that CC RN try calling her back.     Plan:   CC RN will try reaching patient in the next 1-2 business days.     EDYTA OttoN RN Clinic Care Coordinator   [place], [place], [place]  Phone: [phone]

## 2020-03-25 ENCOUNTER — HOSPITAL ENCOUNTER (EMERGENCY)
Facility: CLINIC | Age: 30
Discharge: HOME | End: 2020-03-25
Attending: EMERGENCY MEDICINE | Admitting: EMERGENCY MEDICINE
Payer: MEDICARE

## 2020-03-25 ENCOUNTER — TELEPHONE (OUTPATIENT)
Dept: FAMILY MEDICINE | Facility: OTHER | Age: 30
End: 2020-03-25

## 2020-03-25 VITALS
TEMPERATURE: 98.2 F | RESPIRATION RATE: 16 BRPM | HEART RATE: 98 BPM | DIASTOLIC BLOOD PRESSURE: 66 MMHG | OXYGEN SATURATION: 98 % | SYSTOLIC BLOOD PRESSURE: 113 MMHG

## 2020-03-25 DIAGNOSIS — K60.2 PERIANAL FISSURE: ICD-10-CM

## 2020-03-25 LAB
ALBUMIN SERPL-MCNC: 3.7 G/DL (ref 3.4–5)
ALP SERPL-CCNC: 82 U/L (ref 40–150)
ALT SERPL W P-5'-P-CCNC: 27 U/L (ref 0–50)
ANION GAP SERPL CALCULATED.3IONS-SCNC: 4 MMOL/L (ref 3–14)
AST SERPL W P-5'-P-CCNC: 20 U/L (ref 0–45)
BASOPHILS # BLD AUTO: 0 10E9/L (ref 0–0.2)
BASOPHILS NFR BLD AUTO: 0.2 %
BILIRUB SERPL-MCNC: 0.3 MG/DL (ref 0.2–1.3)
BUN SERPL-MCNC: 24 MG/DL (ref 7–30)
CALCIUM SERPL-MCNC: 8.9 MG/DL (ref 8.5–10.1)
CHLORIDE SERPL-SCNC: 108 MMOL/L (ref 94–109)
CO2 SERPL-SCNC: 29 MMOL/L (ref 20–32)
CREAT SERPL-MCNC: 0.96 MG/DL (ref 0.52–1.04)
DIFFERENTIAL METHOD BLD: ABNORMAL
EOSINOPHIL # BLD AUTO: 0.1 10E9/L (ref 0–0.7)
EOSINOPHIL NFR BLD AUTO: 1.5 %
ERYTHROCYTE [DISTWIDTH] IN BLOOD BY AUTOMATED COUNT: 12.3 % (ref 10–15)
GFR SERPL CREATININE-BSD FRML MDRD: 79 ML/MIN/{1.73_M2}
GLUCOSE SERPL-MCNC: 87 MG/DL (ref 70–99)
HCT VFR BLD AUTO: 38.9 % (ref 35–47)
HGB BLD-MCNC: 13.7 G/DL (ref 11.7–15.7)
IMM GRANULOCYTES # BLD: 0 10E9/L (ref 0–0.4)
IMM GRANULOCYTES NFR BLD: 0 %
LACTATE BLD-SCNC: 1.5 MMOL/L (ref 0.7–2)
LIPASE SERPL-CCNC: 105 U/L (ref 73–393)
LYMPHOCYTES # BLD AUTO: 2.4 10E9/L (ref 0.8–5.3)
LYMPHOCYTES NFR BLD AUTO: 45 %
MCH RBC QN AUTO: 34.3 PG (ref 26.5–33)
MCHC RBC AUTO-ENTMCNC: 35.2 G/DL (ref 31.5–36.5)
MCV RBC AUTO: 98 FL (ref 78–100)
MONOCYTES # BLD AUTO: 0.5 10E9/L (ref 0–1.3)
MONOCYTES NFR BLD AUTO: 10 %
NEUTROPHILS # BLD AUTO: 2.3 10E9/L (ref 1.6–8.3)
NEUTROPHILS NFR BLD AUTO: 43.3 %
NRBC # BLD AUTO: 0 10*3/UL
NRBC BLD AUTO-RTO: 0 /100
PLATELET # BLD AUTO: 263 10E9/L (ref 150–450)
POTASSIUM SERPL-SCNC: 3.9 MMOL/L (ref 3.4–5.3)
PROT SERPL-MCNC: 7.4 G/DL (ref 6.8–8.8)
RBC # BLD AUTO: 3.99 10E12/L (ref 3.8–5.2)
SODIUM SERPL-SCNC: 141 MMOL/L (ref 133–144)
WBC # BLD AUTO: 5.3 10E9/L (ref 4–11)

## 2020-03-25 PROCEDURE — 99285 EMERGENCY DEPT VISIT HI MDM: CPT | Mod: Z6 | Performed by: EMERGENCY MEDICINE

## 2020-03-25 PROCEDURE — 96374 THER/PROPH/DIAG INJ IV PUSH: CPT | Performed by: EMERGENCY MEDICINE

## 2020-03-25 PROCEDURE — 25000128 H RX IP 250 OP 636: Performed by: EMERGENCY MEDICINE

## 2020-03-25 PROCEDURE — 96376 TX/PRO/DX INJ SAME DRUG ADON: CPT | Performed by: EMERGENCY MEDICINE

## 2020-03-25 PROCEDURE — 85025 COMPLETE CBC W/AUTO DIFF WBC: CPT | Performed by: EMERGENCY MEDICINE

## 2020-03-25 PROCEDURE — 99285 EMERGENCY DEPT VISIT HI MDM: CPT | Mod: 25 | Performed by: EMERGENCY MEDICINE

## 2020-03-25 PROCEDURE — 80053 COMPREHEN METABOLIC PANEL: CPT | Performed by: EMERGENCY MEDICINE

## 2020-03-25 PROCEDURE — 83605 ASSAY OF LACTIC ACID: CPT | Performed by: EMERGENCY MEDICINE

## 2020-03-25 PROCEDURE — 96375 TX/PRO/DX INJ NEW DRUG ADDON: CPT | Performed by: EMERGENCY MEDICINE

## 2020-03-25 PROCEDURE — 96361 HYDRATE IV INFUSION ADD-ON: CPT | Performed by: EMERGENCY MEDICINE

## 2020-03-25 PROCEDURE — 25800030 ZZH RX IP 258 OP 636: Performed by: EMERGENCY MEDICINE

## 2020-03-25 PROCEDURE — 83690 ASSAY OF LIPASE: CPT | Performed by: EMERGENCY MEDICINE

## 2020-03-25 RX ORDER — ONDANSETRON 2 MG/ML
4 INJECTION INTRAMUSCULAR; INTRAVENOUS ONCE
Status: COMPLETED | OUTPATIENT
Start: 2020-03-25 | End: 2020-03-25

## 2020-03-25 RX ORDER — DIPHENHYDRAMINE HYDROCHLORIDE 50 MG/ML
25 INJECTION INTRAMUSCULAR; INTRAVENOUS ONCE
Status: COMPLETED | OUTPATIENT
Start: 2020-03-25 | End: 2020-03-25

## 2020-03-25 RX ORDER — SODIUM CHLORIDE 9 MG/ML
1000 INJECTION, SOLUTION INTRAVENOUS CONTINUOUS
Status: DISCONTINUED | OUTPATIENT
Start: 2020-03-25 | End: 2020-03-25 | Stop reason: HOSPADM

## 2020-03-25 RX ORDER — HYDROMORPHONE HYDROCHLORIDE 1 MG/ML
0.3 INJECTION, SOLUTION INTRAMUSCULAR; INTRAVENOUS; SUBCUTANEOUS ONCE
Status: COMPLETED | OUTPATIENT
Start: 2020-03-25 | End: 2020-03-25

## 2020-03-25 RX ADMIN — HYDROMORPHONE HYDROCHLORIDE 1 MG: 1 INJECTION, SOLUTION INTRAMUSCULAR; INTRAVENOUS; SUBCUTANEOUS at 01:55

## 2020-03-25 RX ADMIN — ONDANSETRON 4 MG: 2 INJECTION INTRAMUSCULAR; INTRAVENOUS at 02:11

## 2020-03-25 RX ADMIN — SODIUM CHLORIDE 1000 ML: 9 INJECTION, SOLUTION INTRAVENOUS at 01:54

## 2020-03-25 RX ADMIN — DIPHENHYDRAMINE HYDROCHLORIDE 25 MG: 50 INJECTION, SOLUTION INTRAMUSCULAR; INTRAVENOUS at 02:11

## 2020-03-25 RX ADMIN — DIPHENHYDRAMINE HYDROCHLORIDE 25 MG: 50 INJECTION, SOLUTION INTRAMUSCULAR; INTRAVENOUS at 01:54

## 2020-03-25 RX ADMIN — HYDROMORPHONE HYDROCHLORIDE 0.3 MG: 1 INJECTION, SOLUTION INTRAMUSCULAR; INTRAVENOUS; SUBCUTANEOUS at 03:31

## 2020-03-25 NOTE — ED PROVIDER NOTES
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  She says at the anterior aspect of her rectum there is a new area that she thinks is having some discharge of blood and pus.  She denies fevers, chills.  She has diffuse abdominal pain which is unchanged and related to her Crohn's she states.  This is all over, no focal area of pain.  She is having normal bowel movements with some associated pain.  She does not have fevers, chills, cough, sick contact or recent travel.  She does not have nausea or vomiting.  She recently had a CT that was relatively normal aproximately3 days ago at [facility].  She said the new lesion started tonight, she is worse that she has a tear in her rectum now.  She is also concerned about a fistula.    A video  used throughout her stay in the ED.    Past Medical History  Past Medical History:   Diagnosis Date     Abdominal pain 10/31- 11/4/2005    [facility] admit for Crohn's     Allergic rhinitis, cause unspecified     Allergic rhinitis     Arthritis      C. difficile diarrhea     Past, no current diarrhea.     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, without mention of  intractable epilepsy     pseudoseizures diagnosed after extensive neurologic eval     Migraine 07/21/12    D/C 07/22/12-[facility]     Migraine, unspecified, without mention of intractable migraine without mention of status migrainosus     Migraine     Mild intermittent asthma     mild intermittent     Mycoplasma infection in conditions classified elsewhere and of unspecified site      Other chronic pain     Back pain for 6 years     Renal disease     Kidney stones     Unspecified hearing loss     congenital hearing loss     Past Surgical History:   Procedure Laterality Date     AS REMOVAL OF COCCYX  10/18/2017     C FUSION OF SACROILIAC JOINT  10/26/2018     COLONOSCOPY  7/1/2009    [facility], [place]     COLONOSCOPY N/A 7/17/2019    Procedure: Colonoscopy, With Polypectomy And Biopsy;  Surgeon: Edwin Conde MD;  Location: [facility] OR     COLONOSCOPY WITH CO2 INSUFFLATION N/A 7/17/2019    Procedure: COLONOSCOPY, WITH CO2 INSUFFLATION;  Surgeon: Edwin Conde MD;  Location: [facility] OR     COMBINED ESOPHAGOSCOPY, GASTROSCOPY, DUODENOSCOPY (EGD) WITH CO2 INSUFFLATION N/A 4/12/2019    Procedure: COMBINED ESOPHAGOSCOPY, GASTROSCOPY, DUODENOSCOPY (EGD) WITH CO2 INSUFFLATION;  Surgeon: Edwin Conde MD;  Location: [facility] OR     ESOPHAGOSCOPY, GASTROSCOPY, DUODENOSCOPY (EGD), COMBINED N/A 4/12/2019    Procedure: Combined Esophagoscopy, Gastroscopy, Duodenoscopy (Egd), Biopsy Single Or Multiple;  Surgeon: Edwin Conde MD;  Location: [facility] OR     FUSION SPINE POSTERIOR MINIMALLY INVASIVE ONE LEVEL N/A 2/23/2017    Procedure: FUSION SPINE POSTERIOR MINIMALLY INVASIVE ONE LEVEL;  L4-5 Oblique Lateral Lumbar Interbody Fusion   Epidural steroid injection.   Transpedicular Bone marrow aspiration;  Surgeon: Jeniffer Eugene MD;  Location: [facility] OR     HC COLONOSCOPY THRU STOMA WITH BIOPSY  10/29/2003    Impression is that of normal appearing colonoscopy, without evidence of  rectal bleeding.     HC COLONOSCOPY THRU STOMA, DIAGNOSTIC  10/00    normal     HC COLONOSCOPY THRU STOMA, DIAGNOSTIC  Oct 2009    Dr López- normal     HC EEG AWAKE AND SLEEP      abnormal     HC MRI BRAIN W/O CONTRAST  12/00    normal     HC REMOVAL GALLBLADDER  8/5/2009    [facility], [place].     HC UGI ENDOSCOPY DIAG W BIOPSY  11/11/09    Normal esophagus     HC UGI ENDOSCOPY, SIMPLE EXAM  7/00, 10/00    mild chronic esophagitis and duodenitis, neg H pylori     HC UGI ENDOSCOPY, SIMPLE EXAM  01/20/2005    Esophagogastroduodenoscopy, colonoscopy with biopsies.  [facility] UGI ENDOSCOPY, SIMPLE EXAM  7/1/2009    [facility], [place].      UGI ENDOSCOPY, SIMPLE EXAM  11/11/2009    attempted upper GI, pt. could not tolerate procedure:[facility]     ORTHOPEDIC SURGERY  October 19,2011    diskectomy L4-L5     acetaminophen (TYLENOL) 500 MG tablet  albuterol (PROAIR HFA/PROVENTIL HFA/VENTOLIN HFA) 108 (90 Base) MCG/ACT inhaler  albuterol (PROVENTIL) (2.5 MG/3ML) 0.083% neb solution  alum & mag hydroxide-simethicone (MAALOX ADVANCED MAX ST) 400-400-40 MG/5ML SUSP suspension  ARIPiprazole (ABILIFY) 15 MG tablet  aspirin (ASA) 81 MG tablet  azelastine (OPTIVAR) 0.05 % ophthalmic solution  buPROPion (WELLBUTRIN XL) 150 MG 24 hr tablet  busPIRone (BUSPAR) 15 MG tablet  clonazePAM (KLONOPIN) 1 MG tablet  cyclobenzaprine (FLEXERIL) 10 MG tablet  diphenhydrAMINE (BENADRYL) 50 MG capsule  DULoxetine (CYMBALTA) 60 MG EC capsule  EPINEPHrine (ANY BX GENERIC EQUIV) 0.3 MG/0.3ML injection 2-pack  fexofenadine (ALLEGRA) 180 MG tablet  gabapentin (NEURONTIN) 300 MG capsule  lidocaine (XYLOCAINE) 2 % solution  losartan (COZAAR) 100 MG tablet  medical cannabis (Patient's own supply)  medroxyPROGESTERone (DEPO-PROVERA) 150 MG/ML IM injection  methocarbamol (ROBAXIN) 750 MG tablet  metoprolol succinate ER (TOPROL-XL) 200 MG 24 hr tablet  neomycin-polymyxin-hydrocortisone  (CORTISPORIN) 3.5-21640-8 otic suspension  NONFORMULARY  ondansetron (ZOFRAN-ODT) 8 MG ODT tab  pantoprazole (PROTONIX) 40 MG EC tablet  prazosin (MINIPRESS) 1 MG capsule  prochlorperazine (COMPAZINE) 10 MG tablet  rizatriptan (MAXALT-MLT) 5 MG ODT  sucralfate (CARAFATE) 1 GM tablet  valACYclovir (VALTREX) 1000 mg tablet      Allergies   Allergen Reactions     Iohexol Hives     Other reaction(s): Hives  IV contrast; patient states she tolerates contrast if she gets benadryl before  IV contrast; patient states she tolerates contrast if she gets benadryl before     Ativan [Lorazepam] Hives     At the IV site     Baclofen      hives     Bees Hives, Swelling and Difficulty breathing     Caffeine      Contrast Dye      Hives,   Updated 5/10/2016 CT Contrast.     Iodine Hives     Methocarbamol Swelling     Metoclopramide      Other reaction(s): Tremors  LW Reaction: shaking/sweating     Midazolam      Other reaction(s): Agitation     Monosodium Glutamate      Morphine Other (See Comments)     Difficulty with urination     Nsaids Other (See Comments)     GI BLEED x2     Other (Do Not Use) Other (See Comments)     Xanaflex- pt becomes disoriented and loses bladder control     Reglan [Metoclopramide Hcl] Other (See Comments)     shaking     Soma [Carisoprodol] Visual Disturbance     Sleep walking     Tizanidine      Topamax Other (See Comments)     Topamax [Topiramate] Nausea     Tingling  GI/Vomit     Tramadol      Severe Headache, Seizure Risk     Tylenol W/Codeine [Acetaminophen-Codeine] Nausea and Itching     Tylenol 3     Versed Other (See Comments)     Zolmitriptan      Makes face feel like its twitching     Droperidol Anxiety     Flu Virus Vaccine Rash      Arm swelling     Past medical history, past surgical history, medications, and allergies were reviewed with the patient. Additional pertinent items: None    Family History  Family History   Problem Relation Age of Onset     Gastrointestinal Disease Brother          severe Crohn's     Neurologic Disorder Brother         Seizures post head injury     Depression Brother      Substance Abuse Brother      Genitourinary Problems Father         kidney stones     Diabetes Father      Heart Disease Father         Open heart surgery     Breast Cancer Maternal Grandmother      Parkinsonism Maternal Grandmother      Cerebrovascular Disease Paternal Grandmother      Cancer Maternal Grandfather         Lung     Cardiovascular Paternal Grandfather         Heart Attack     Substance Abuse Mother         in recovery x1 year      Lung Cancer Paternal Uncle      Cancer Paternal Uncle      Lung Cancer Maternal Uncle      Family history was reviewed with the patient. Additional pertinent items: None    Social History  Social History     Tobacco Use     Smoking status: Current Every Day Smoker     Packs/day: 0.25     Years: 5.00     Pack years: 1.25     Types: Cigarettes     Smokeless tobacco: Never Used     Tobacco comment: due to illness    Substance Use Topics     Alcohol use: Not Currently     Comment: Not since last July 2018     Drug use: Yes     Types: Marijuana     Comment: Medical Marijuana currently-- More CBD      Social history was reviewed with the patient. Additional pertinent items: None    Review of Systems  A complete review of systems was performed with pertinent positives and negatives noted in the HPI, and all other systems negative.    Physical Exam   BP: 113/87  Pulse: 129  Temp: 98.2  F (36.8  C)  Resp: 16  SpO2: 100 %  Physical Exam  Physical Exam   Constitutional: oriented to person, place, and time. appears well-developed and well-nourished.   HENT:   Head: Normocephalic and atraumatic.   Neck: Normal range of motion.   Pulmonary/Chest: Effort normal. No respiratory distress.   Cardiac: No murmurs, rubs, gallops. RRR.  Abdominal: Abdomen soft, nondistended.  There is mild tenderness to her abdomen with no focality.  No CVA tenderness bilaterally.  No rebound  tenderness.  MSK: Long bones without deformity or evidence of trauma  Neurological: alert and oriented to person, place, and time.   Skin: Skin is warm and dry.   Psychiatric:  normal mood and affect.  behavior is normal. Thought content normal.   : External hemorrhoids noted without bleeding.  Rectal exam is normal without pain no fluctuance, tenderness, purulent or bloody material on rectal exam.  There is a 1 mm posterior midline anal fissure with no surrounding erythema, warmth, induration.  There is no tenderness palpation around the anus itself.  No fluctuance around the anus.    ED Course      Procedures    Results for orders placed or performed during the hospital encounter of 03/25/20   CBC with platelets differential     Status: Abnormal   Result Value Ref Range    WBC 5.3 4.0 - 11.0 10e9/L    RBC Count 3.99 3.8 - 5.2 10e12/L    Hemoglobin 13.7 11.7 - 15.7 g/dL    Hematocrit 38.9 35.0 - 47.0 %    MCV 98 78 - 100 fl    MCH 34.3 (H) 26.5 - 33.0 pg    MCHC 35.2 31.5 - 36.5 g/dL    RDW 12.3 10.0 - 15.0 %    Platelet Count 263 150 - 450 10e9/L    Diff Method Automated Method     % Neutrophils 43.3 %    % Lymphocytes 45.0 %    % Monocytes 10.0 %    % Eosinophils 1.5 %    % Basophils 0.2 %    % Immature Granulocytes 0.0 %    Nucleated RBCs 0 0 /100    Absolute Neutrophil 2.3 1.6 - 8.3 10e9/L    Absolute Lymphocytes 2.4 0.8 - 5.3 10e9/L    Absolute Monocytes 0.5 0.0 - 1.3 10e9/L    Absolute Eosinophils 0.1 0.0 - 0.7 10e9/L    Absolute Basophils 0.0 0.0 - 0.2 10e9/L    Abs Immature Granulocytes 0.0 0 - 0.4 10e9/L    Absolute Nucleated RBC 0.0    Comprehensive metabolic panel     Status: None   Result Value Ref Range    Sodium 141 133 - 144 mmol/L    Potassium 3.9 3.4 - 5.3 mmol/L    Chloride 108 94 - 109 mmol/L    Carbon Dioxide 29 20 - 32 mmol/L    Anion Gap 4 3 - 14 mmol/L    Glucose 87 70 - 99 mg/dL    Urea Nitrogen 24 7 - 30 mg/dL    Creatinine 0.96 0.52 - 1.04 mg/dL    GFR Estimate 79 >60 mL/min/[1.73_m2]     GFR Estimate If Black >90 >60 mL/min/[1.73_m2]    Calcium 8.9 8.5 - 10.1 mg/dL    Bilirubin Total 0.3 0.2 - 1.3 mg/dL    Albumin 3.7 3.4 - 5.0 g/dL    Protein Total 7.4 6.8 - 8.8 g/dL    Alkaline Phosphatase 82 40 - 150 U/L    ALT 27 0 - 50 U/L    AST 20 0 - 45 U/L   Lactic acid whole blood     Status: None   Result Value Ref Range    Lactic Acid 1.5 0.7 - 2.0 mmol/L   Lipase     Status: None   Result Value Ref Range    Lipase 105 73 - 393 U/L          No results found for any visits on 03/25/20.  Medications   0.9% sodium chloride BOLUS (has no administration in time range)     Followed by   sodium chloride 0.9% infusion (has no administration in time range)   HYDROmorphone (DILAUDID) injection 1 mg (has no administration in time range)   diphenhydrAMINE (BENADRYL) injection 25 mg (has no administration in time range)        Assessments & Plan (with Medical Decision Making)   MDM  Patient is presenting with concern about lesion on her rectum.  On exam there is a 1 mm anal fissure which does not appear to be draining any fluid, blood, pus.  There is no surrounding induration or erythema to suggest abscess.  The site of the old abscess appears quite well with no signs of infection.  Rectal exam is unremarkable with no pain, tenderness, fluctuance so less likely deep space abscess.  She had a CT of the abdomen pelvis 2 days ago which was unremarkable, not feel repeat is necessary in light of normal appearing labs.  White blood count continues to be normal.  Her rate is elevated however she frequently presents the ER is with heart rates in the 120s and 130s.  She has a significant history of pain seeking behavior and I am concerned about this as she is requesting frequent doses of Dilaudid.  Patient is requesting for narcotics to go home with.  Discussed that this is not amenable to be given narcotics to in light of her history opiate dependence.  Patient will be discharged with recommendations to call her primary care  provider and her GI doctor.  She does not have elevated inflammatory markers or changes to LFTs are not feel further imaging is necessary.  On review of chart this is similar to multiple.  Sensations where she request pain meds despite relatively normal work-up.  Her heart rate is slightly elevated however this is not uncommon for this patient when looking at chart over several previous visits.  The patient will be discharged.  Offered the patient topical lidocaine to help with her pain however she declined this.  Discussed that she can take stool softeners however did not like this idea as well.      I have reviewed the nursing notes. I have reviewed the findings, diagnosis, plan and need for follow up with the patient.    New Prescriptions    No medications on file       Final diagnoses:   Perianal fissure       --  Alvaro Antoine MD   Emergency Medicine   [facility], [place], EMERGENCY DEPARTMENT  3/25/2020     Alvaro Antoine MD  03/25/20 0325       Alvaro Antoine MD  03/25/20 0329

## 2020-03-25 NOTE — TELEPHONE ENCOUNTER
M for pt to call back.    Yarelis Abbasi RN  Gastroenterology Care Coordinator  [place], [place]

## 2020-03-25 NOTE — TELEPHONE ENCOUNTER
Spoke with patient through  .    States that she thinks she has a fissure and feels that its an urgent issue.  Asked her if she spoke with a GI nurse line.  She states yes and that they wont schedule her till may at least.  Informed her there is nothing we can do to help get her in with GI any sooner.  We are not even having patients come to clinic.  The best thing she can do is keep the area clean, dry and watch for infection.  Continue to call GI for further guidance.     Patient is upset that she is not getting the care she is wanted and feels that her issue is urgent.  Advised her she will have to wait for GI to schedule.     Patient hung up the call per .    Iggy Bernstein, RN, BSN

## 2020-03-25 NOTE — ED AVS SNAPSHOT
[facility], [place], Emergency Department  [address]  [facility] [address]  Phone:  [phone]  Fax:  [phone]                                    Katy Islas   MRN: [id number]    Department:  [facility], Emergency Department   Date of Visit:  3/25/2020           After Visit Summary Signature Page    I have received my discharge instructions, and my questions have been answered. I have discussed any challenges I see with this plan with the nurse or doctor.    ..........................................................................................................................................  Patient/Patient Representative Signature      ..........................................................................................................................................  Patient Representative Print Name and Relationship to Patient    ..................................................               ................................................  Date                                   Time    ..........................................................................................................................................  Reviewed by Signature/Title    ...................................................              ..............................................  Date                                               Time          22EPIC Rev 08/18

## 2020-03-25 NOTE — DISCHARGE INSTRUCTIONS
Please call your gastroenterologist and primary care provider to discuss any options with telehealth or over the phone consultation    Please do a sitz bath as described in your discharge paperwork 3-4 times a day.    Continue take your stool softeners, this will decrease your pain

## 2020-03-25 NOTE — PROGRESS NOTES
Clinic Care Coordination Contact  [facility]/Voicemail    Referral Source: ED Follow-Up  Clinical Data: Care Coordinator Outreach  Outreach attempted x 2.  Left message on patient's voicemail with call back information and requested return call.  Plan: Care Coordinator sent care coordination introduction letter on 3/16/20 via mail. Care Coordinator will do no further outreaches at this time.    RADHA Otto RN Clinic Care Coordinator   [place], [place], [place]  Phone: [phone]

## 2020-03-26 ENCOUNTER — NURSE TRIAGE (OUTPATIENT)
Dept: NURSING | Facility: CLINIC | Age: 30
End: 2020-03-26

## 2020-03-26 ENCOUNTER — TRANSFERRED RECORDS (OUTPATIENT)
Dept: HEALTH INFORMATION MANAGEMENT | Facility: CLINIC | Age: 30
End: 2020-03-26

## 2020-03-26 LAB
CREAT SERPL-MCNC: 1.05 MG/DL (ref 0.57–1.11)
GFR SERPL CREATININE-BSD FRML MDRD: >60 ML/MIN/1.73M(2)
GLUCOSE SERPL-MCNC: 70 MG/DL (ref 70–105)
POTASSIUM SERPL-SCNC: 4.4 MMOL/L (ref 3.5–5.1)

## 2020-03-26 NOTE — TELEPHONE ENCOUNTER
S: Calling with the help with a . Throwing up blood.  B: Today at 1630 threw up blood.  Had stomach pain that came on suddenly and she could not finish her dinner.  Emesis looked liked coffee grounds.  After wards felt dizzy and a bit faint.  Has moderate diffuse abdominal pain .  Had BM x2 today with blood in BM.  Was DX on 3/25 with abscess on anus. PMH  bipolar disorder, multiple episodes of perirectal abscess, Crohn's colitis, UGI suspected bleed..  Per guidelines to been seen in the ED.   A: Per guideline go to the ED now.    R: She has no one who can drive her to the hospital.Writer will call 911.    Kavita Diggs RN, [facility]        Reason for Disposition    Shock suspected (e.g., cold/pale/clammy skin, too weak to stand, low BP, rapid pulse)    Difficult to awaken or acting confused (e.g., disoriented, slurred speech)    Sounds like a life-threatening emergency to the triager    Protocols used: ABDOMINAL PAIN - FEMALE-A-AH

## 2020-03-26 NOTE — TELEPHONE ENCOUNTER
There is nothing a clinic setting could do for her - coffee ground emesis could be upper GI bleed which she has had in the past. She needs to go to an urgent care or ED.     Zach Rosario PA-C  [facility]

## 2020-03-26 NOTE — TELEPHONE ENCOUNTER
"Nurse Triage SBAR      Situation New as of this morning, cough up coffee ground material :    Background See notes below :     Assessment Needs to be evaluated in ED for new symptoms:    (See information below for more triage details.)    Recommendation May be able to convince Katy to go to ED.  Perhaps assist with sooner appointment with colorectal surgery?   Routing to provider for recommendation on next steps.    Protocol Recommended Disposition: Emergency department        Call with assistance of .  Katy is calling because she left a message and no one has called her back.  Reports has \"an abscess which is open and draining green stuff, very disgusting, it is still infected, am taking my antibiotics, I know I have a fistula, I have a lot of hemorrhoids.  This morning I threw up bile and what looked like coffee grounds, have never had that before, temp today at 99.8\"  Discussed telephone visit or evisit which Katy declined both, \"I don't like them\"  Appears Katy cancelled telephone visits x 2 on 3/25 and has cancelled future visit with colorectal surgery.    Vomiting coffee ground looking material is new for Katy, advised on needing to be seen in ED, states \"come on, they don't do anything, I need to see someone today\"  reports Katy has hung up.      Reason for Disposition    Vomiting red blood or black (coffee ground) material    Additional Information    Negative: Shock suspected (e.g., cold/pale/clammy skin, too weak to stand, low BP, rapid pulse)    Negative: Difficult to awaken or acting confused (e.g., disoriented, slurred speech)    Negative: Sounds like a life-threatening emergency to the triager    Negative: Vomiting occurs only while coughing    Negative: Pregnant < 20 Weeks and nausea/vomiting began in early pregnancy (i.e., 4-8 weeks pregnant)    Negative: Chest pain    Negative: Headache is main symptom    Answer Assessment - Initial Assessment Questions  1. " "VOMITING SEVERITY: \"How many times have you vomited in the past 24 hours?\"      - MILD:  1 - 2 times/day     - MODERATE: 3 - 5 times/day, decreased oral intake without significant weight loss or symptoms of dehydration     - SEVERE: 6 or more times/day, vomits everything or nearly everything, with significant weight loss, symptoms of dehydration       Mild, once today  2. ONSET: \"When did the vomiting begin?\"       This morning  3. FLUIDS: \"What fluids or food have you vomited up today?\" \"Have you been able to keep any fluids down?\"      yes  4. ABDOMINAL PAIN: \"Are your having any abdominal pain?\" If yes : \"How bad is it and what does it feel like?\" (e.g., crampy, dull, intermittent, constant)       no  5. DIARRHEA: \"Is there any diarrhea?\" If so, ask: \"How many times today?\"       N/A  6. CONTACTS: \"Is there anyone else in the family with the same symptoms?\"       N/A  7. CAUSE: \"What do you think is causing your vomiting?\"      N/A  8. HYDRATION STATUS: \"Any signs of dehydration?\" (e.g., dry mouth [not only dry lips], too weak to stand) \"When did you last urinate?\"      N/A  9. OTHER SYMPTOMS: \"Do you have any other symptoms?\" (e.g., fever, headache, vertigo, vomiting blood or coffee grounds, recent head injury)      Denies vomiting blood, does report threw up bile and coffee ground looking stuff this morning  10. PREGNANCY: \"Is there any chance you are pregnant?\" \"When was your last menstrual period?\"        N/A    Protocols used: VOMITING-A-OH      "

## 2020-03-26 NOTE — TELEPHONE ENCOUNTER
Patient's disposition was ER per protocol.    Finale Dessertshart message sent to patient letting her know this and that CDL agrees, nothing we can do for her symptoms in the clinic setting.    Marianna Layne, EDYTAN, RN, PHN

## 2020-03-27 NOTE — TELEPHONE ENCOUNTER
Pt was advised to go to the ER yesterday. Will route to Dr. Conde so he is up to date on patients condition.    Yarelis Abbasi RN  Gastroenterology Care Coordinator  [place], [place]

## 2020-03-29 ENCOUNTER — HOSPITAL ENCOUNTER (EMERGENCY)
Facility: CLINIC | Age: 30
Discharge: HOME | End: 2020-03-29
Attending: EMERGENCY MEDICINE | Admitting: EMERGENCY MEDICINE
Payer: MEDICARE

## 2020-03-29 VITALS
SYSTOLIC BLOOD PRESSURE: 130 MMHG | DIASTOLIC BLOOD PRESSURE: 81 MMHG | WEIGHT: 198.3 LBS | RESPIRATION RATE: 15 BRPM | OXYGEN SATURATION: 100 % | BODY MASS INDEX: 37.47 KG/M2 | HEART RATE: 110 BPM | TEMPERATURE: 99.3 F

## 2020-03-29 DIAGNOSIS — R10.84 ABDOMINAL PAIN, GENERALIZED: ICD-10-CM

## 2020-03-29 DIAGNOSIS — R11.2 NON-INTRACTABLE VOMITING WITH NAUSEA, UNSPECIFIED VOMITING TYPE: ICD-10-CM

## 2020-03-29 DIAGNOSIS — K62.89 RECTAL PAIN: ICD-10-CM

## 2020-03-29 LAB
ALBUMIN SERPL-MCNC: 3.6 G/DL (ref 3.4–5)
ALBUMIN UR-MCNC: NEGATIVE MG/DL
ALP SERPL-CCNC: 91 U/L (ref 40–150)
ALT SERPL W P-5'-P-CCNC: 41 U/L (ref 0–50)
ANION GAP SERPL CALCULATED.3IONS-SCNC: 5 MMOL/L (ref 3–14)
APPEARANCE UR: CLEAR
AST SERPL W P-5'-P-CCNC: 32 U/L (ref 0–45)
BACTERIA #/AREA URNS HPF: ABNORMAL /HPF
BASOPHILS # BLD AUTO: 0 10E9/L (ref 0–0.2)
BASOPHILS NFR BLD AUTO: 0.4 %
BILIRUB SERPL-MCNC: 0.2 MG/DL (ref 0.2–1.3)
BILIRUB UR QL STRIP: NEGATIVE
BUN SERPL-MCNC: 19 MG/DL (ref 7–30)
CALCIUM SERPL-MCNC: 8.9 MG/DL (ref 8.5–10.1)
CHLORIDE SERPL-SCNC: 107 MMOL/L (ref 94–109)
CO2 SERPL-SCNC: 27 MMOL/L (ref 20–32)
COLOR UR AUTO: ABNORMAL
CREAT SERPL-MCNC: 0.84 MG/DL (ref 0.52–1.04)
CRP SERPL-MCNC: <2.9 MG/L (ref 0–8)
DIFFERENTIAL METHOD BLD: ABNORMAL
EOSINOPHIL # BLD AUTO: 0.1 10E9/L (ref 0–0.7)
EOSINOPHIL NFR BLD AUTO: 1.6 %
ERYTHROCYTE [DISTWIDTH] IN BLOOD BY AUTOMATED COUNT: 12.3 % (ref 10–15)
ERYTHROCYTE [SEDIMENTATION RATE] IN BLOOD BY WESTERGREN METHOD: 12 MM/H (ref 0–20)
GFR SERPL CREATININE-BSD FRML MDRD: >90 ML/MIN/{1.73_M2}
GLUCOSE SERPL-MCNC: 90 MG/DL (ref 70–99)
GLUCOSE UR STRIP-MCNC: NEGATIVE MG/DL
HCG UR QL: NEGATIVE
HCT VFR BLD AUTO: 38 % (ref 35–47)
HGB BLD-MCNC: 13.3 G/DL (ref 11.7–15.7)
HGB UR QL STRIP: NEGATIVE
IMM GRANULOCYTES # BLD: 0 10E9/L (ref 0–0.4)
IMM GRANULOCYTES NFR BLD: 0 %
INTERNAL QC OK POCT: YES
KETONES UR STRIP-MCNC: NEGATIVE MG/DL
LEUKOCYTE ESTERASE UR QL STRIP: NEGATIVE
LIPASE SERPL-CCNC: 112 U/L (ref 73–393)
LYMPHOCYTES # BLD AUTO: 2.2 10E9/L (ref 0.8–5.3)
LYMPHOCYTES NFR BLD AUTO: 39.1 %
MCH RBC QN AUTO: 33.8 PG (ref 26.5–33)
MCHC RBC AUTO-ENTMCNC: 35 G/DL (ref 31.5–36.5)
MCV RBC AUTO: 97 FL (ref 78–100)
MONOCYTES # BLD AUTO: 0.5 10E9/L (ref 0–1.3)
MONOCYTES NFR BLD AUTO: 9.2 %
NEUTROPHILS # BLD AUTO: 2.8 10E9/L (ref 1.6–8.3)
NEUTROPHILS NFR BLD AUTO: 49.7 %
NITRATE UR QL: NEGATIVE
NRBC # BLD AUTO: 0 10*3/UL
NRBC BLD AUTO-RTO: 0 /100
PH UR STRIP: 6 PH (ref 5–7)
PLATELET # BLD AUTO: 247 10E9/L (ref 150–450)
POTASSIUM SERPL-SCNC: 4 MMOL/L (ref 3.4–5.3)
PROT SERPL-MCNC: 7.3 G/DL (ref 6.8–8.8)
RBC # BLD AUTO: 3.93 10E12/L (ref 3.8–5.2)
RBC #/AREA URNS AUTO: <1 /HPF (ref 0–2)
SODIUM SERPL-SCNC: 139 MMOL/L (ref 133–144)
SOURCE: ABNORMAL
SP GR UR STRIP: 1.02 (ref 1–1.03)
SQUAMOUS #/AREA URNS AUTO: 2 /HPF (ref 0–1)
UROBILINOGEN UR STRIP-MCNC: NORMAL MG/DL (ref 0–2)
WBC # BLD AUTO: 5.6 10E9/L (ref 4–11)
WBC #/AREA URNS AUTO: 3 /HPF (ref 0–5)

## 2020-03-29 PROCEDURE — 96374 THER/PROPH/DIAG INJ IV PUSH: CPT

## 2020-03-29 PROCEDURE — 80053 COMPREHEN METABOLIC PANEL: CPT | Performed by: EMERGENCY MEDICINE

## 2020-03-29 PROCEDURE — 25000128 H RX IP 250 OP 636: Performed by: EMERGENCY MEDICINE

## 2020-03-29 PROCEDURE — 96375 TX/PRO/DX INJ NEW DRUG ADDON: CPT

## 2020-03-29 PROCEDURE — 99285 EMERGENCY DEPT VISIT HI MDM: CPT | Mod: Z6 | Performed by: EMERGENCY MEDICINE

## 2020-03-29 PROCEDURE — 96372 THER/PROPH/DIAG INJ SC/IM: CPT

## 2020-03-29 PROCEDURE — 85025 COMPLETE CBC W/AUTO DIFF WBC: CPT | Performed by: EMERGENCY MEDICINE

## 2020-03-29 PROCEDURE — 85652 RBC SED RATE AUTOMATED: CPT | Performed by: EMERGENCY MEDICINE

## 2020-03-29 PROCEDURE — 25800030 ZZH RX IP 258 OP 636: Performed by: EMERGENCY MEDICINE

## 2020-03-29 PROCEDURE — 83690 ASSAY OF LIPASE: CPT | Performed by: EMERGENCY MEDICINE

## 2020-03-29 PROCEDURE — 99285 EMERGENCY DEPT VISIT HI MDM: CPT | Mod: 25

## 2020-03-29 PROCEDURE — 86140 C-REACTIVE PROTEIN: CPT | Performed by: EMERGENCY MEDICINE

## 2020-03-29 PROCEDURE — 96361 HYDRATE IV INFUSION ADD-ON: CPT

## 2020-03-29 PROCEDURE — 81025 URINE PREGNANCY TEST: CPT | Performed by: EMERGENCY MEDICINE

## 2020-03-29 PROCEDURE — 25000132 ZZH RX MED GY IP 250 OP 250 PS 637: Mod: GY | Performed by: EMERGENCY MEDICINE

## 2020-03-29 PROCEDURE — 81001 URINALYSIS AUTO W/SCOPE: CPT | Performed by: EMERGENCY MEDICINE

## 2020-03-29 RX ORDER — OXYCODONE HYDROCHLORIDE 5 MG/1
10 TABLET ORAL ONCE
Status: COMPLETED | OUTPATIENT
Start: 2020-03-29 | End: 2020-03-29

## 2020-03-29 RX ORDER — ONDANSETRON 2 MG/ML
8 INJECTION INTRAMUSCULAR; INTRAVENOUS ONCE
Status: COMPLETED | OUTPATIENT
Start: 2020-03-29 | End: 2020-03-29

## 2020-03-29 RX ORDER — ONDANSETRON 8 MG/1
8 TABLET, ORALLY DISINTEGRATING ORAL EVERY 8 HOURS PRN
Qty: 12 TABLET | Refills: 0 | Status: ON HOLD | OUTPATIENT
Start: 2020-03-29 | End: 2020-04-19

## 2020-03-29 RX ORDER — DIPHENHYDRAMINE HYDROCHLORIDE 50 MG/ML
25 INJECTION INTRAMUSCULAR; INTRAVENOUS ONCE
Status: COMPLETED | OUTPATIENT
Start: 2020-03-29 | End: 2020-03-29

## 2020-03-29 RX ORDER — HYDROMORPHONE HYDROCHLORIDE 1 MG/ML
0.5 INJECTION, SOLUTION INTRAMUSCULAR; INTRAVENOUS; SUBCUTANEOUS ONCE
Status: COMPLETED | OUTPATIENT
Start: 2020-03-29 | End: 2020-03-29

## 2020-03-29 RX ORDER — PROMETHAZINE HYDROCHLORIDE 25 MG/ML
12.5 INJECTION INTRAMUSCULAR; INTRAVENOUS ONCE
Status: COMPLETED | OUTPATIENT
Start: 2020-03-29 | End: 2020-03-29

## 2020-03-29 RX ADMIN — ONDANSETRON 8 MG: 2 INJECTION INTRAMUSCULAR; INTRAVENOUS at 20:33

## 2020-03-29 RX ADMIN — PROMETHAZINE HYDROCHLORIDE 12.5 MG: 25 INJECTION INTRAMUSCULAR; INTRAVENOUS at 22:08

## 2020-03-29 RX ADMIN — HYDROMORPHONE HYDROCHLORIDE 0.5 MG: 1 INJECTION, SOLUTION INTRAMUSCULAR; INTRAVENOUS; SUBCUTANEOUS at 20:32

## 2020-03-29 RX ADMIN — OXYCODONE HYDROCHLORIDE 10 MG: 5 TABLET ORAL at 22:06

## 2020-03-29 RX ADMIN — DIPHENHYDRAMINE HYDROCHLORIDE 25 MG: 50 INJECTION, SOLUTION INTRAMUSCULAR; INTRAVENOUS at 20:33

## 2020-03-29 RX ADMIN — SODIUM CHLORIDE 1000 ML: 9 INJECTION, SOLUTION INTRAVENOUS at 20:40

## 2020-03-29 ASSESSMENT — ENCOUNTER SYMPTOMS
ABDOMINAL PAIN: 1
FEVER: 0
DYSURIA: 0
SHORTNESS OF BREATH: 0
RECTAL PAIN: 1
VOMITING: 1
DIARRHEA: 0
NAUSEA: 1

## 2020-03-29 NOTE — ED AVS SNAPSHOT
[facility], [place], Emergency Department  [address]  [place] [address]  Phone:  [phone]  Fax:  [phone]                                    Katy Islas   MRN: [id number]    Department:  [facility], Emergency Department   Date of Visit:  3/29/2020           After Visit Summary Signature Page    I have received my discharge instructions, and my questions have been answered. I have discussed any challenges I see with this plan with the nurse or doctor.    ..........................................................................................................................................  Patient/Patient Representative Signature      ..........................................................................................................................................  Patient Representative Print Name and Relationship to Patient    ..................................................               ................................................  Date                                   Time    ..........................................................................................................................................  Reviewed by Signature/Title    ...................................................              ..............................................  Date                                               Time          22EPIC Rev 08/18

## 2020-03-30 ENCOUNTER — MYC REFILL (OUTPATIENT)
Dept: FAMILY MEDICINE | Facility: OTHER | Age: 30
End: 2020-03-30

## 2020-03-30 ENCOUNTER — E-VISIT (OUTPATIENT)
Dept: FAMILY MEDICINE | Facility: OTHER | Age: 30
End: 2020-03-30
Payer: MEDICARE

## 2020-03-30 ENCOUNTER — MYC MEDICAL ADVICE (OUTPATIENT)
Dept: FAMILY MEDICINE | Facility: OTHER | Age: 30
End: 2020-03-30

## 2020-03-30 DIAGNOSIS — F43.23 ADJUSTMENT DISORDER WITH MIXED ANXIETY AND DEPRESSED MOOD: ICD-10-CM

## 2020-03-30 DIAGNOSIS — F32.0 MILD MAJOR DEPRESSION (H): ICD-10-CM

## 2020-03-30 DIAGNOSIS — F31.63 BIPOLAR DISORDER, CURRENT EPISODE MIXED, SEVERE, WITHOUT PSYCHOTIC FEATURES (H): ICD-10-CM

## 2020-03-30 DIAGNOSIS — F31.63 BIPOLAR DISORDER, CURRENT EPISODE MIXED, SEVERE, WITHOUT PSYCHOTIC FEATURES (H): Primary | ICD-10-CM

## 2020-03-30 DIAGNOSIS — F41.9 ANXIETY: ICD-10-CM

## 2020-03-30 PROCEDURE — G2012 BRIEF CHECK IN BY MD/QHP: HCPCS | Performed by: PHYSICIAN ASSISTANT

## 2020-03-30 ASSESSMENT — PATIENT HEALTH QUESTIONNAIRE - PHQ9
SUM OF ALL RESPONSES TO PHQ QUESTIONS 1-9: 9
10. IF YOU CHECKED OFF ANY PROBLEMS, HOW DIFFICULT HAVE THESE PROBLEMS MADE IT FOR YOU TO DO YOUR WORK, TAKE CARE OF THINGS AT HOME, OR GET ALONG WITH OTHER PEOPLE: SOMEWHAT DIFFICULT
SUM OF ALL RESPONSES TO PHQ QUESTIONS 1-9: 9

## 2020-03-30 NOTE — ED PROVIDER NOTES
ED Provider Note  [facility]      History     Chief Complaint   Patient presents with     Nausea     Nausea, abdominal pain, has had rectal abscess for almost 2 months.     The history is provided by the patient and medical records. A  was used (official ).   Nausea   Associated symptoms include abdominal pain (lower). Pertinent negatives include no chest pain and no shortness of breath.     Katy Islas is a 30 year old female with a past medical history significant for congenital hearing loss, Crohn's disease, renal disease, GERD, intestinal infection due to C. Difficile, bipolar disorder, conversion disorder, cluster B traits, likely borderline personality disorder, fibromyalgia, drug-seeking behavior and HTN,  who presents to the Emergency Department today with nausea. She reports that she has had a rectal abscess for almost 2 months.  She reports that she has been on 2 different medications for this, but it keeps coming back.  She reports that she was supposed to see colorectal surgery on Tuesday, but they canceled her appointment due to Covid restrictions.  She states that about 3 hours ago she had 2 episodes of vomiting that had dark brown spots.  She states that she called her GI doctor and they advised her to come to the emergency department.  She has chronic abdominal pain and reports intermittent lower abdominal pain that has gotten worse recently.  She reports pain both internally and externally of the rectum.  She states that she is passing soft formed stool, but that it causes extreme rectal pain.  She reports that she is still very nauseous and expressed worry that she has a fistula.  She does not see a pain specialist for her chronic pain at this time.  She denies fever, chest pain, or shortness of breath.  Patient reports history of cholecystectomy denies other abdominal surgeries.  Last menstrual period she thinks was in November or  December, however, she is not getting regular menstrual periods because she is on Depo-Provera shots.  Denies any dysuria or hematuria.    Past Medical History  Past Medical History:   Diagnosis Date     Abdominal pain 10/31- 11/4/2005    [facility] admit for [facility]     Allergic rhinitis, cause unspecified     Allergic rhinitis     Arthritis      C. difficile diarrhea     Past, no current diarrhea.     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, without mention of intractable epilepsy     pseudoseizures diagnosed after extensive neurologic eval     Migraine 07/21/12    D/C 07/22/12-[facility]     Migraine, unspecified, without mention of intractable migraine without mention of status migrainosus     Migraine     Mild intermittent asthma     mild intermittent     Mycoplasma infection in conditions classified elsewhere and of unspecified site      Other chronic pain     Back pain for 6 years     Renal disease     Kidney stones     Unspecified hearing loss     congenital hearing loss     Past Surgical History:   Procedure Laterality Date     AS REMOVAL OF COCCYX  10/18/2017     C FUSION OF SACROILIAC JOINT  10/26/2018     COLONOSCOPY  7/1/2009    [facility] [place].     COLONOSCOPY N/A 7/17/2019    Procedure: Colonoscopy, With Polypectomy And Biopsy;  Surgeon: Edwin Conde MD;  Location: [facility] OR     COLONOSCOPY WITH CO2 INSUFFLATION N/A 7/17/2019    Procedure: COLONOSCOPY, WITH CO2 INSUFFLATION;  Surgeon: Edwin Conde,  MD;  Location: [facility] OR     COMBINED ESOPHAGOSCOPY, GASTROSCOPY, DUODENOSCOPY (EGD) WITH CO2 INSUFFLATION N/A 4/12/2019    Procedure: COMBINED ESOPHAGOSCOPY, GASTROSCOPY, DUODENOSCOPY (EGD) WITH CO2 INSUFFLATION;  Surgeon: Edwin Conde MD;  Location: [facility] OR     ESOPHAGOSCOPY, GASTROSCOPY, DUODENOSCOPY (EGD), COMBINED N/A 4/12/2019    Procedure: Combined Esophagoscopy, Gastroscopy, Duodenoscopy (Egd), Biopsy Single Or Multiple;  Surgeon: Edwin Conde MD;  Location: [facility] OR     FUSION SPINE POSTERIOR MINIMALLY INVASIVE ONE LEVEL N/A 2/23/2017    Procedure: FUSION SPINE POSTERIOR MINIMALLY INVASIVE ONE LEVEL;  L4-5 Oblique Lateral Lumbar Interbody Fusion   Epidural steroid injection.   Transpedicular Bone marrow aspiration;  Surgeon: Jeniffer Eugene MD;  Location: [facility] OR     HC COLONOSCOPY THRU STOMA WITH BIOPSY  10/29/2003    Impression is that of normal appearing colonoscopy, without evidence of rectal bleeding.     HC COLONOSCOPY THRU STOMA, DIAGNOSTIC  10/00    normal     HC COLONOSCOPY THRU STOMA, DIAGNOSTIC  Oct 2009    Dr López- normal     HC EEG AWAKE AND SLEEP      abnormal     HC MRI BRAIN W/O CONTRAST  12/00    normal     HC REMOVAL GALLBLADDER  8/5/2009    [facility], [facility].     HC UGI ENDOSCOPY DIAG W BIOPSY  11/11/09    Normal esophagus     HC UGI ENDOSCOPY, SIMPLE EXAM  7/00, 10/00    mild chronic esophagitis and duodenitis, neg H pylori     HC UGI ENDOSCOPY, SIMPLE EXAM  01/20/2005    Esophagogastroduodenoscopy, colonoscopy with biopsies.  [facility] UGI ENDOSCOPY, SIMPLE EXAM  7/1/2009    [facility], [facility].      UGI ENDOSCOPY, SIMPLE EXAM  11/11/2009    attempted upper GI, pt. could not tolerate procedure:[facility]     ORTHOPEDIC SURGERY  October 19,2011    diskectomy L4-L5     ondansetron (ZOFRAN ODT) 8 MG ODT tab  acetaminophen (TYLENOL) 500 MG tablet  albuterol (PROAIR HFA/PROVENTIL HFA/VENTOLIN HFA) 108 (90  Base) MCG/ACT inhaler  albuterol (PROVENTIL) (2.5 MG/3ML) 0.083% neb solution  alum & mag hydroxide-simethicone (MAALOX ADVANCED MAX ST) 400-400-40 MG/5ML SUSP suspension  ARIPiprazole (ABILIFY) 15 MG tablet  aspirin (ASA) 81 MG tablet  azelastine (OPTIVAR) 0.05 % ophthalmic solution  buPROPion (WELLBUTRIN XL) 150 MG 24 hr tablet  busPIRone (BUSPAR) 15 MG tablet  clonazePAM (KLONOPIN) 1 MG tablet  cyclobenzaprine (FLEXERIL) 10 MG tablet  diphenhydrAMINE (BENADRYL) 50 MG capsule  DULoxetine (CYMBALTA) 60 MG EC capsule  EPINEPHrine (ANY BX GENERIC EQUIV) 0.3 MG/0.3ML injection 2-pack  fexofenadine (ALLEGRA) 180 MG tablet  gabapentin (NEURONTIN) 300 MG capsule  lidocaine (XYLOCAINE) 2 % solution  losartan (COZAAR) 100 MG tablet  medical cannabis (Patient's own supply)  medroxyPROGESTERone (DEPO-PROVERA) 150 MG/ML IM injection  methocarbamol (ROBAXIN) 750 MG tablet  metoprolol succinate ER (TOPROL-XL) 200 MG 24 hr tablet  neomycin-polymyxin-hydrocortisone (CORTISPORIN) 3.5-04567-7 otic suspension  NONFORMULARY  pantoprazole (PROTONIX) 40 MG EC tablet  prazosin (MINIPRESS) 1 MG capsule  prochlorperazine (COMPAZINE) 10 MG tablet  rizatriptan (MAXALT-MLT) 5 MG ODT  sucralfate (CARAFATE) 1 GM tablet  valACYclovir (VALTREX) 1000 mg tablet      Allergies   Allergen Reactions     Iohexol Hives     Other reaction(s): Hives  IV contrast; patient states she tolerates contrast if she gets benadryl before  IV contrast; patient states she tolerates contrast if she gets benadryl before     Ativan [Lorazepam] Hives     At the IV site     Baclofen      hives     Bees Hives, Swelling and Difficulty breathing     Caffeine      Contrast Dye      Hives,   Updated 5/10/2016 CT Contrast.     Iodine Hives     Methocarbamol Swelling     Metoclopramide      Other reaction(s): Tremors  LW Reaction: shaking/sweating     Midazolam      Other reaction(s): Agitation     Monosodium Glutamate      Morphine Other (See Comments)     Difficulty with  urination     Nsaids Other (See Comments)     GI BLEED x2     Other (Do Not Use) Other (See Comments)     Xanaflex- pt becomes disoriented and loses bladder control     Reglan [Metoclopramide Hcl] Other (See Comments)     shaking     Soma [Carisoprodol] Visual Disturbance     Sleep walking     Tizanidine      Topamax Other (See Comments)     Topamax [Topiramate] Nausea     Tingling  GI/Vomit     Tramadol      Severe Headache, Seizure Risk     Tylenol W/Codeine [Acetaminophen-Codeine] Nausea and Itching     Tylenol 3     Versed Other (See Comments)     Zolmitriptan      Makes face feel like its twitching     Droperidol Anxiety     Flu Virus Vaccine Rash      Arm swelling     Past medical history, past surgical history, medications, and allergies were reviewed with the patient. Additional pertinent items: None    Family History  Family History   Problem Relation Age of Onset     Gastrointestinal Disease Brother         severe Crohn's     Neurologic Disorder Brother         Seizures post head injury     Depression Brother      Substance Abuse Brother      Genitourinary Problems Father         kidney stones     Diabetes Father      Heart Disease Father         Open heart surgery     Breast Cancer Maternal Grandmother      Parkinsonism Maternal Grandmother      Cerebrovascular Disease Paternal Grandmother      Cancer Maternal Grandfather         Lung     Cardiovascular Paternal Grandfather         Heart Attack     Substance Abuse Mother         in recovery x1 year      Lung Cancer Paternal Uncle      Cancer Paternal Uncle      Lung Cancer Maternal Uncle          Social History  Social History     Tobacco Use     Smoking status: Current Every Day Smoker     Packs/day: 0.25     Years: 5.00     Pack years: 1.25     Types: Cigarettes     Smokeless tobacco: Never Used     Tobacco comment: due to illness    Substance Use Topics     Alcohol use: Not Currently     Comment: Not since last July 2018     Drug use: Yes     Types:  Marijuana     Comment: Medical Marijuana currently-- More CBD      Social history was reviewed with the patient. Additional pertinent items: None    Review of Systems   Constitutional: Negative for fever.   Respiratory: Negative for shortness of breath.    Cardiovascular: Negative for chest pain.   Gastrointestinal: Positive for abdominal pain (lower), nausea, rectal pain and vomiting. Negative for diarrhea.   Genitourinary: Negative for dysuria.   All other systems reviewed and are negative.    A complete review of systems was performed with pertinent positives and negatives noted in the HPI, and all other systems negative.    Physical Exam   BP: 130/81  Pulse: 110  Heart Rate: 110  Temp: 99.3  F (37.4  C)  Resp: 15  Weight: 89.9 kg (198 lb 4.8 oz)  SpO2: 100 %  Physical Exam    GEN:  Alert, well developed, no acute distress  HEENT:  PERRL, EOMI, Mucous membranes are moist.   Cardio:  Regular tachycardia, no murmur, radial pulses equal bilaterally  PULM:  Lungs clear, good air movement, no wheezes, rales   Abd:  Soft, normal bowel sounds, there is some lower abdominal discomfort with palpation, no percussion tenderness  Back exam:  No CVA tenderness  Rectal exam done with nurse present in the room. No external hemorrhoids or bleeding, no drainage.  There is tenderness anteriorly over the rectum. Due to pain, ABDI was not done.    Musculoskeletal:  normal range of motion, no lower extremity swelling or calf tenderness  Neuro:  Alert and oriented X3, Follows commands, moving all extremities spontaneously   Skin:  Warm, dry   ED Course      Procedures           Upon review of the electronic medical record, I see that the patient was seen at the [place] emergency department on March 26, just 3 days ago, with similar symptoms.  The emergency department provider at that time noted several recent emergency department and clinic visits over the last month, many with similar symptoms.  He listed them in his note and I  will paste that here:  3/3/2020: [place] [facility] - perianal abscess with I&D at that time  3/4/2020 - [facility] - Dr. Alamo, abscess looked good, no additional plan of treatment  3/6/2020 - [facility] - Dr. Alamo - looks good,   3/9/2020: [facility] ED - fever - diagnosis adverse effect of antibiotic and diarrhea  3/10/2020: [facility] - perianal fissure -   3/15/2020: [facility] ED: perirectal abscess  3/19/2020: [place]: UTI symptoms  3/20/2020: [facility] ED: abd pain; normal CT abd/pelvis; normal CRP and ESR, WBC normal  3/22/2020: [facility] ED: healing rectal abscess area / fissure?  3/23/2020: E-Visit: started on Klonopin and bupropion for depression   3/25/2020: [facility] - perianal fissure - given IV pain medications, had normal labs, and was sent home.    It is also been noted in the medical record that the patient often has a slight tachycardia with heart rates typically running 110-120s.  Heart rate was 110 on arrival today as well.  Laboratory evaluation was unremarkable.  Results are shown below.    Results for orders placed or performed during the hospital encounter of 03/29/20   CBC with platelets differential     Status: Abnormal   Result Value Ref Range    WBC 5.6 4.0 - 11.0 10e9/L    RBC Count 3.93 3.8 - 5.2 10e12/L    Hemoglobin 13.3 11.7 - 15.7 g/dL    Hematocrit 38.0 35.0 - 47.0 %    MCV 97 78 - 100 fl    MCH 33.8 (H) 26.5 - 33.0 pg    MCHC 35.0 31.5 - 36.5 g/dL    RDW 12.3 10.0 - 15.0 %    Platelet Count 247 150 - 450 10e9/L    Diff Method Automated Method     % Neutrophils 49.7 %    % Lymphocytes 39.1 %    % Monocytes 9.2 %    % Eosinophils 1.6 %    % Basophils 0.4 %    % Immature Granulocytes 0.0 %    Nucleated RBCs 0 0 /100    Absolute Neutrophil 2.8 1.6 - 8.3 10e9/L    Absolute Lymphocytes 2.2 0.8 - 5.3 10e9/L    Absolute Monocytes 0.5 0.0 - 1.3 10e9/L     Absolute Eosinophils 0.1 0.0 - 0.7 10e9/L    Absolute Basophils 0.0 0.0 - 0.2 10e9/L    Abs Immature Granulocytes 0.0 0 - 0.4 10e9/L    Absolute Nucleated RBC 0.0    Comprehensive metabolic panel     Status: None   Result Value Ref Range    Sodium 139 133 - 144 mmol/L    Potassium 4.0 3.4 - 5.3 mmol/L    Chloride 107 94 - 109 mmol/L    Carbon Dioxide 27 20 - 32 mmol/L    Anion Gap 5 3 - 14 mmol/L    Glucose 90 70 - 99 mg/dL    Urea Nitrogen 19 7 - 30 mg/dL    Creatinine 0.84 0.52 - 1.04 mg/dL    GFR Estimate >90 >60 mL/min/[1.73_m2]    GFR Estimate If Black >90 >60 mL/min/[1.73_m2]    Calcium 8.9 8.5 - 10.1 mg/dL    Bilirubin Total 0.2 0.2 - 1.3 mg/dL    Albumin 3.6 3.4 - 5.0 g/dL    Protein Total 7.3 6.8 - 8.8 g/dL    Alkaline Phosphatase 91 40 - 150 U/L    ALT 41 0 - 50 U/L    AST 32 0 - 45 U/L   Lipase     Status: None   Result Value Ref Range    Lipase 112 73 - 393 U/L   UA with Microscopic reflex to Culture     Status: Abnormal    Specimen: Urine clean catch; Unspecified Urine   Result Value Ref Range    Color Urine Light Yellow     Appearance Urine Clear     Glucose Urine Negative NEG^Negative mg/dL    Bilirubin Urine Negative NEG^Negative    Ketones Urine Negative NEG^Negative mg/dL    Specific Gravity Urine 1.018 1.003 - 1.035    Blood Urine Negative NEG^Negative    pH Urine 6.0 5.0 - 7.0 pH    Protein Albumin Urine Negative NEG^Negative mg/dL    Urobilinogen mg/dL Normal 0.0 - 2.0 mg/dL    Nitrite Urine Negative NEG^Negative    Leukocyte Esterase Urine Negative NEG^Negative    Source Unspecified Urine     WBC Urine 3 0 - 5 /HPF    RBC Urine <1 0 - 2 /HPF    Bacteria Urine Few (A) NEG^Negative /HPF    Squamous Epithelial /HPF Urine 2 (H) 0 - 1 /HPF   CRP inflammation     Status: None   Result Value Ref Range    CRP Inflammation <2.9 0.0 - 8.0 mg/L   Erythrocyte sedimentation rate auto     Status: None   Result Value Ref Range    Sed Rate 12 0 - 20 mm/h   hCG qual urine POCT     Status: Normal   Result Value  Ref Range    HCG Qual Urine Negative neg    Internal QC OK Yes      Medications   ondansetron (ZOFRAN) injection 8 mg (8 mg Intravenous Given 3/29/20 2033)   0.9% sodium chloride BOLUS (0 mLs Intravenous Stopped 3/29/20 2150)   diphenhydrAMINE (BENADRYL) injection 25 mg (25 mg Intramuscular Given 3/29/20 2033)   HYDROmorphone (PF) (DILAUDID) injection 0.5 mg (0.5 mg Intravenous Given 3/29/20 2032)   oxyCODONE (ROXICODONE) tablet 10 mg (10 mg Oral Given 3/29/20 2206)   promethazine (PHENERGAN) intraMUSCULAR injection 12.5 mg (12.5 mg Intramuscular Given 3/29/20 2208)     Patient was treated with IV Zofran, IV fluids for possible dehydration. she asked for IV Benadryl and was instead given an IM injection of Benadryl since there is no sign of allergic reaction or anaphylaxis.  She was given 1 dose of IV Dilaudid and was advised that she would only get 1 dose of IV pain medication and if needed would get oral pain medicine after the initial IV dose.  She was later given a dose of oral oxycodone.       Assessments & Plan (with Medical Decision Making)   Patient presents with longstanding abdominal pain as well as 1 to 2 months of rectal pain.  She did have an anal abscess drained at the beginning of this month, however, there is no sign of anal abscess at this time.  She is having pain with passing stools and may have a fissure that I am not able to see today, so I do think she needs follow-up with colorectal surgery.  As far as having a possible perirectal abscess, she had a CT scan less than 10 days ago that was negative for that, so I do not think she needs a repeat CT scan today given the longstanding nature of her symptoms.  Patient's labs are completely stable, there is no sign of increase in the inflammatory markers, no fever, so she was advised to follow-up with colorectal surgery soon as possible.  During this CoVID 19 pandemic, patient reports she is having difficulty getting an appointment.  I encouraged her  to call the clinic and explained that she has been to the emergency department several times and needs an appointment with colorectal surgery.  She did ask for a prescription for opiates at the time of discharge, and I explained to her that the emergency department does not prescribe opiates for patients with chronic pain.  She was given a prescription for oral dissolving Zofran.    I have reviewed the nursing notes. I have reviewed the findings, diagnosis, plan and need for follow up with the patient.    Discharge Medication List as of 3/29/2020 10:18 PM          Final diagnoses:   Abdominal pain, generalized   Rectal pain   Non-intractable vomiting with nausea, unspecified vomiting type     I, Erum Lopez, am serving as a trained medical scribe to document services personally performed by Humera Ortiz MD, based on the provider's statements to me.     I, Humera Ortiz MD, was physically present and have reviewed and verified the accuracy of this note documented by Erum Lopez.    --  Erum Lopez   Emergency Medicine   [facility] EMERGENCY DEPARTMENT  3/29/2020     Humera Ortiz MD  03/30/20 0039

## 2020-03-30 NOTE — DISCHARGE INSTRUCTIONS
Please make an appointment to follow up with [facility] (phone: [phone]) and GI Clinic (phone: [phone]) as soon as possible for further care.

## 2020-03-31 ENCOUNTER — MYC MEDICAL ADVICE (OUTPATIENT)
Dept: FAMILY MEDICINE | Facility: OTHER | Age: 30
End: 2020-03-31

## 2020-03-31 ENCOUNTER — NURSE TRIAGE (OUTPATIENT)
Dept: FAMILY MEDICINE | Facility: OTHER | Age: 30
End: 2020-03-31

## 2020-03-31 DIAGNOSIS — F32.0 MILD MAJOR DEPRESSION (H): ICD-10-CM

## 2020-03-31 DIAGNOSIS — F31.63 BIPOLAR DISORDER, CURRENT EPISODE MIXED, SEVERE, WITHOUT PSYCHOTIC FEATURES (H): ICD-10-CM

## 2020-03-31 DIAGNOSIS — F43.23 ADJUSTMENT DISORDER WITH MIXED ANXIETY AND DEPRESSED MOOD: ICD-10-CM

## 2020-03-31 RX ORDER — CLONAZEPAM 1 MG/1
1 TABLET ORAL 2 TIMES DAILY PRN
Qty: 28 TABLET | Refills: 0 | Status: CANCELLED | OUTPATIENT
Start: 2020-03-31

## 2020-03-31 RX ORDER — CLONAZEPAM 1 MG/1
1 TABLET ORAL 2 TIMES DAILY PRN
Qty: 28 TABLET | Refills: 0 | Status: SHIPPED | OUTPATIENT
Start: 2020-03-31 | End: 2020-04-14

## 2020-03-31 ASSESSMENT — PATIENT HEALTH QUESTIONNAIRE - PHQ9: SUM OF ALL RESPONSES TO PHQ QUESTIONS 1-9: 9

## 2020-03-31 NOTE — TELEPHONE ENCOUNTER
Patient calling.  She would like to speak with a nurse ASAP.  She states her rectum is pink, purple and black.  Thank you.

## 2020-03-31 NOTE — TELEPHONE ENCOUNTER
Pending Prescriptions:                       Disp   Refills    clonazePAM (KLONOPIN) 1 MG tablet          14 tab*0        Sig: Take 1 tablet (1 mg) by mouth 2 times daily as needed           for anxiety    Routing refill request to provider for review/approval because:  Drug not on the [facility] refill protocol

## 2020-03-31 NOTE — TELEPHONE ENCOUNTER
Spoke to patient.     States her main concern is her pain associated with it.   Green stools.   Really dark stools.   The coloring around abscess looks like spider veins.     No pain medications were given in ED.   Pain today is 4/10.   States a little bit of blood in stools, when she wipes and in her stools.     If fevers, low grade.   States she is pretty exhausted, has a headache and nausea.     Naproxen and zofran refills requests.     The abscess broke through again.   Lots of pus and thick.   Green mucous/pus drainage.   States skin is peeling around rash - not sure if it is from dryness or not.   Dark pink/purple on the outside of abscess.     States she has a rash.   States it is veiny looking.   States little bumps.     States it looks like the rash is coming from the rectum.   Doesn't look like red streaks.     States she has heard that kind of rash could cause an infection.     States the colorectal office was rude, states they weren't taking her seriously. Only put down that the evaluation was fine.     Took a hot bath after the abscess opened up again.   Worried it will keep closing up and get infected.   More like a burning sensation than an itch.   The pus that comes from the abscess is really irritating.     Still using cool packs.     RN advised ED for concern of new infection/drainage.   Patient states she will go to [facility] ED as they have colorectal surgeon/staff there.   States she will drive herself.     Isabella Vazquez RN BSN

## 2020-03-31 NOTE — TELEPHONE ENCOUNTER
RN to talk to her.   TC might have resources for other colorectal surgeons that might see her.     Zach Rosario PA-C  [facility]

## 2020-03-31 NOTE — TELEPHONE ENCOUNTER
See mychart encounter for more information.     Isabella Vazquez RN BSN      Additional Information    Negative: Sounds like a life-threatening emergency to the triager    Negative: Possible contact with poison ivy or oak    Negative: Insect bite(s) suspected    Negative: Athlete's Foot suspected (i.e., itchy rash between the toes)    Negative: Jock Itch suspected (i.e., itchy rash on inner thighs near genital area)    Negative: Wound infection suspected (i.e., pain, spreading redness, or pus; in a cut, puncture, scrape or sutured wound)    Negative: Rash of external female genital area (vulva)    Negative: Rash of penis or scrotum    Negative: Small spot, skin growth, or mole    Fever and localized purple or blood-colored spots or dots that are not from injury or friction    Protocols used: RASH OR REDNESS - [id number]

## 2020-03-31 NOTE — TELEPHONE ENCOUNTER
Attempt #1     Not able to connect with ASL interpretor.     Will try again later.     Isabella Vazquez RN BSN    Colorectal surgery:  [facility]  Address: [address] [place] [id number]  Phone: [phone]

## 2020-03-31 NOTE — TELEPHONE ENCOUNTER
PHQ 2/19/2020 3/23/2020 3/30/2020   PHQ-9 Total Score 4 4 9   Q9: Thoughts of better off dead/self-harm past 2 weeks Not at all Not at all Not at all     JULIETH-7 SCORE 2/4/2020 2/19/2020 3/23/2020   Total Score - - -   Total Score 16 (severe anxiety) 2 (minimal anxiety) 10 (moderate anxiety)   Total Score 16 2 10     MyChart message  Hi, I just wanted to be sure I understand your directions....  take my meds with Wellbutrin?  Correct?  Are we going to leave same dosage for cymbalta, buspar, and Abilify?  I m seeing my therapist through video phone Tuesday 2:30. I m hoping I can talk to my psychiatrist. It s very frustrating for me. I see the colorectal surgeon in the morning. My butt is red/purple,  lots hemorrhoids and abscess still draining. I m pretty sure it s a fistula. Also why do I get infections so easy?!  I m so done being sick. I don t feel normal.      The 1mg klonopin helps. I have a few left. I m doing OK....  just miss my horse so much, but he was in a lot pain and couldn t walk to his hay, grain, water. He was very old. It wAs time.  He is now at peace and I got his urn. It s beautiful!     I don t understand why I get irritated and very angry towards others because I m too frustrated with all health issues going on. It s too much. My pain is bad. I want to know what s wrong!  I almost want to get admitted in the hospital so doctors can find out what s wrong!!!     Can I get rx for Naproxen too?  I won t take often. Only if it s bad!  Please do not send any narcotic pain meds. Just naproxen or something to help with my joint arthritis pain. I just bought joint supplement, called Osteo Bi-Flex. My mom takes meds for her arthritis, but she says it s very expensive and it probably will hurt my stomach. What do you recommend?    Zach Tomlinson-LOVE Moss  [facility]     Provider E-Visit time total (minutes): 18 minutes

## 2020-04-01 ENCOUNTER — HOSPITAL ENCOUNTER (EMERGENCY)
Facility: CLINIC | Age: 30
Discharge: HOME | End: 2020-04-01
Attending: EMERGENCY MEDICINE | Admitting: EMERGENCY MEDICINE
Payer: MEDICARE

## 2020-04-01 VITALS
HEIGHT: 61 IN | DIASTOLIC BLOOD PRESSURE: 74 MMHG | BODY MASS INDEX: 37.47 KG/M2 | TEMPERATURE: 98.6 F | RESPIRATION RATE: 16 BRPM | SYSTOLIC BLOOD PRESSURE: 132 MMHG | OXYGEN SATURATION: 96 %

## 2020-04-01 DIAGNOSIS — R10.9 CHRONIC ABDOMINAL PAIN: ICD-10-CM

## 2020-04-01 DIAGNOSIS — G89.29 CHRONIC ABDOMINAL PAIN: ICD-10-CM

## 2020-04-01 PROCEDURE — 99282 EMERGENCY DEPT VISIT SF MDM: CPT | Performed by: EMERGENCY MEDICINE

## 2020-04-01 PROCEDURE — 99284 EMERGENCY DEPT VISIT MOD MDM: CPT | Mod: Z6 | Performed by: EMERGENCY MEDICINE

## 2020-04-01 ASSESSMENT — ENCOUNTER SYMPTOMS
NAUSEA: 1
ABDOMINAL PAIN: 0
HEMATURIA: 0
SHORTNESS OF BREATH: 0
DIFFICULTY URINATING: 0
FEVER: 0
DYSURIA: 0
VOMITING: 0
RECTAL PAIN: 1
FREQUENCY: 0

## 2020-04-01 NOTE — ED NOTES
Pt c/o rectal abscess that is draining green puss since yesterday. States she is supposed to see GI, but they have canceled d/t Covid risk. On assessment, RN could not see any green puss or abscess on the outside of her rectum. Pt c/o nausea that went away with Pedialyte.

## 2020-04-01 NOTE — DISCHARGE INSTRUCTIONS
Please make an appointment to follow up with [facility] (phone: [phone]) as soon as possible even if entirely better.

## 2020-04-01 NOTE — ED PROVIDER NOTES
ED Provider Note  [facility]      History     Chief Complaint   Patient presents with     Wound Infection     The history is provided by the patient and medical records.     Katy Islas is a 30 year old female with a medical history significant for congenital hearing loss, Crohn's disease, renal disease, GERD, C. difficile, hypertension, bipolar disorder, likely borderline personality disorder, fibromyalgia and drug-seeking behavior.  Per review of patient's chart, patient has multiple recent Emergency Department visits.  Most recently, patient was seen in the Emergency Department here on 3/29/2020.  In that note they summarize the patient's most recent visits to the Emergency Departments as well as to her clinics.    Upon review of the electronic medical record, I see that the patient was seen at the [facility] emergency department on March 26, just 3 days ago, with similar symptoms.  The emergency department provider at that time noted several recent emergency department and clinic visits over the last month, many with similar symptoms.  He listed them in his note and I will paste that here:  3/3/2020: [facility] - perianal abscess with I&D at that time  3/4/2020 - [facility] - Dr. Alamo, abscess looked good, no additional plan of treatment  3/6/2020 - [facility] - Dr. Alamo - looks good,   3/9/2020: [facility] - fever - diagnosis adverse effect of antibiotic and diarrhea  3/10/2020: [facility] - perianal fissure -   3/15/2020: [facility] ED: perirectal abscess  3/19/2020: [facility]: UTI symptoms  3/20/2020: [facility] ED: abd pain; normal CT abd/pelvis; normal CRP and ESR, WBC normal  3/22/2020: [facility] ED: healing rectal abscess area / fissure?  3/23/2020: E-Visit: started on Klonopin and bupropion for depression   3/25/2020: [facility] -  "perianal fissure - given IV pain medications, had normal labs, and was sent home.     It is also been noted in the medical record that the patient often has a slight tachycardia with heart rates typically running 110-120s.  Heart rate was 110 on arrival today as well.    Per reviewing the patient's most recent Emergency Department visit, the patient presented complaining of nausea as well as concern for a rectal abscess.  The patient was supposed to be seen in the colorectal surgery clinic on 3/24/2020, but this was canceled due to Covid restrictions.  Patient did have an abscess drained at the beginning of 3/2020, but at the Emergency Department visit on 3/29/2020, there was no sign of a rectal abscess.  Patient's laboratory studies at that time were completely stable and there was no sign of increase in the inflammatory markers.  Patient was told to follow-up in the colorectal surgery clinic as soon as possible.  The patient did ask for a prescription for opiates during that Emergency Department visit, but it was explained to her that the Emergency Department does not prescribe opiates for patients with chronic pain.  She was given a prescription for oral Zofran.    Patient returns to the Emergency Department today complaining of ongoing symptoms.  The patient is still concerned that she has a rectal abscess as she continues to have rectal pain.  She notes she felt a \"rupture\" last night and had purple/greenish drainage.  She reports that she has had some nausea today, but she drank Pedialyte and this resolved.  She denies any episodes of vomiting.  She denies any abdominal pain, fevers or urinary symptoms.  Again, patient reports that she had a clinic visit scheduled for 3/24/2020, but this was canceled and she has been having difficulty making another appointment.  The patient reports that she has been showering/bathing regularly, doing sitz baths.  The patient again is requesting a Percocet prescription that " will last her until she can see her specialist again.      Past Medical History  Past Medical History:   Diagnosis Date     Abdominal pain 10/31- 11/4/2005    [facility] admit for Crohn's     Allergic rhinitis, cause unspecified     Allergic rhinitis     Arthritis      C. difficile diarrhea     Past, no current diarrhea.     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, without mention of intractable epilepsy     pseudoseizures diagnosed after extensive neurologic eval     Migraine 07/21/12    D/C 07/22/12-[facility]     Migraine, unspecified, without mention of intractable migraine without mention of status migrainosus     Migraine     Mild intermittent asthma     mild intermittent     Mycoplasma infection in conditions classified elsewhere and of unspecified site      Other chronic pain     Back pain for 6 years     Renal disease     Kidney stones     Unspecified hearing loss     congenital hearing loss     Past Surgical History:   Procedure Laterality Date     AS REMOVAL OF COCCYX  10/18/2017     C FUSION OF SACROILIAC JOINT  10/26/2018     COLONOSCOPY  7/1/2009    [facility] [place].     COLONOSCOPY N/A 7/17/2019    Procedure: Colonoscopy, With Polypectomy And Biopsy;  Surgeon: Edwin Conde MD;  Location: [facility] OR     COLONOSCOPY WITH CO2 INSUFFLATION N/A 7/17/2019    Procedure: COLONOSCOPY, WITH CO2 INSUFFLATION;  Surgeon: Edwin Conde MD;  Location: [facility] OR     COMBINED ESOPHAGOSCOPY, GASTROSCOPY, DUODENOSCOPY (EGD)  WITH CO2 INSUFFLATION N/A 4/12/2019    Procedure: COMBINED ESOPHAGOSCOPY, GASTROSCOPY, DUODENOSCOPY (EGD) WITH CO2 INSUFFLATION;  Surgeon: Edwin Conde MD;  Location: [facility] OR     ESOPHAGOSCOPY, GASTROSCOPY, DUODENOSCOPY (EGD), COMBINED N/A 4/12/2019    Procedure: Combined Esophagoscopy, Gastroscopy, Duodenoscopy (Egd), Biopsy Single Or Multiple;  Surgeon: Edwin Conde MD;  Location: [facility] OR     FUSION SPINE POSTERIOR MINIMALLY INVASIVE ONE LEVEL N/A 2/23/2017    Procedure: FUSION SPINE POSTERIOR MINIMALLY INVASIVE ONE LEVEL;  L4-5 Oblique Lateral Lumbar Interbody Fusion   Epidural steroid injection.   Transpedicular Bone marrow aspiration;  Surgeon: Jeniffer Eugene MD;  Location: [facility] OR     HC COLONOSCOPY THRU STOMA WITH BIOPSY  10/29/2003    Impression is that of normal appearing colonoscopy, without evidence of rectal bleeding.     HC COLONOSCOPY THRU STOMA, DIAGNOSTIC  10/00    normal     HC COLONOSCOPY THRU STOMA, DIAGNOSTIC  Oct 2009    Dr López- normal     HC EEG AWAKE AND SLEEP      abnormal     HC MRI BRAIN W/O CONTRAST  12/00    normal     HC REMOVAL GALLBLADDER  8/5/2009    [facility].     HC UGI ENDOSCOPY DIAG W BIOPSY  11/11/09    Normal esophagus     HC UGI ENDOSCOPY, SIMPLE EXAM  7/00, 10/00    mild chronic esophagitis and duodenitis, neg H pylori     HC UGI ENDOSCOPY, SIMPLE EXAM  01/20/2005    Esophagogastroduodenoscopy, colonoscopy with biopsies.  [facility] UGI ENDOSCOPY, SIMPLE EXAM  7/1/2009    [facility].      UGI ENDOSCOPY, SIMPLE EXAM  11/11/2009    attempted upper GI, pt. could not tolerate procedure:[facility]     ORTHOPEDIC SURGERY  October 19,2011    diskectomy L4-L5     acetaminophen (TYLENOL) 500 MG tablet  albuterol (PROAIR HFA/PROVENTIL HFA/VENTOLIN HFA) 108 (90 Base) MCG/ACT inhaler  albuterol (PROVENTIL) (2.5 MG/3ML) 0.083% neb solution  alum & mag hydroxide-simethicone (MAALOX  ADVANCED MAX ST) 400-400-40 MG/5ML SUSP suspension  ARIPiprazole (ABILIFY) 15 MG tablet  aspirin (ASA) 81 MG tablet  azelastine (OPTIVAR) 0.05 % ophthalmic solution  buPROPion (WELLBUTRIN XL) 150 MG 24 hr tablet  busPIRone (BUSPAR) 15 MG tablet  clonazePAM (KLONOPIN) 1 MG tablet  cyclobenzaprine (FLEXERIL) 10 MG tablet  diphenhydrAMINE (BENADRYL) 50 MG capsule  DULoxetine (CYMBALTA) 60 MG EC capsule  EPINEPHrine (ANY BX GENERIC EQUIV) 0.3 MG/0.3ML injection 2-pack  fexofenadine (ALLEGRA) 180 MG tablet  gabapentin (NEURONTIN) 300 MG capsule  lidocaine (XYLOCAINE) 2 % solution  losartan (COZAAR) 100 MG tablet  medical cannabis (Patient's own supply)  medroxyPROGESTERone (DEPO-PROVERA) 150 MG/ML IM injection  methocarbamol (ROBAXIN) 750 MG tablet  metoprolol succinate ER (TOPROL-XL) 200 MG 24 hr tablet  neomycin-polymyxin-hydrocortisone (CORTISPORIN) 3.5-16205-9 otic suspension  NONFORMULARY  ondansetron (ZOFRAN ODT) 8 MG ODT tab  pantoprazole (PROTONIX) 40 MG EC tablet  prazosin (MINIPRESS) 1 MG capsule  prochlorperazine (COMPAZINE) 10 MG tablet  rizatriptan (MAXALT-MLT) 5 MG ODT  sucralfate (CARAFATE) 1 GM tablet  valACYclovir (VALTREX) 1000 mg tablet      Allergies   Allergen Reactions     Iohexol Hives     Other reaction(s): Hives  IV contrast; patient states she tolerates contrast if she gets benadryl before  IV contrast; patient states she tolerates contrast if she gets benadryl before     Ativan [Lorazepam] Hives     At the IV site     Baclofen      hives     Bees Hives, Swelling and Difficulty breathing     Caffeine      Contrast Dye      Hives,   Updated 5/10/2016 CT Contrast.     Iodine Hives     Methocarbamol Swelling     Metoclopramide      Other reaction(s): Tremors  LW Reaction: shaking/sweating     Midazolam      Other reaction(s): Agitation     Monosodium Glutamate      Morphine Other (See Comments)     Difficulty with urination     Nsaids Other (See Comments)     GI BLEED x2     Other (Do Not Use)  Other (See Comments)     Xanaflex- pt becomes disoriented and loses bladder control     Reglan [Metoclopramide Hcl] Other (See Comments)     shaking     Soma [Carisoprodol] Visual Disturbance     Sleep walking     Tizanidine      Topamax Other (See Comments)     Topamax [Topiramate] Nausea     Tingling  GI/Vomit     Tramadol      Severe Headache, Seizure Risk     Tylenol W/Codeine [Acetaminophen-Codeine] Nausea and Itching     Tylenol 3     Versed Other (See Comments)     Zolmitriptan      Makes face feel like its twitching     Droperidol Anxiety     Flu Virus Vaccine Rash      Arm swelling     Past medical history, past surgical history, medications, and allergies were reviewed with the patient. Additional pertinent items: None    Family History  Family History   Problem Relation Age of Onset     Gastrointestinal Disease Brother         severe Crohn's     Neurologic Disorder Brother         Seizures post head injury     Depression Brother      Substance Abuse Brother      Genitourinary Problems Father         kidney stones     Diabetes Father      Heart Disease Father         Open heart surgery     Breast Cancer Maternal Grandmother      Parkinsonism Maternal Grandmother      Cerebrovascular Disease Paternal Grandmother      Cancer Maternal Grandfather         Lung     Cardiovascular Paternal Grandfather         Heart Attack     Substance Abuse Mother         in recovery x1 year      Lung Cancer Paternal Uncle      Cancer Paternal Uncle      Lung Cancer Maternal Uncle      Family history was reviewed with the patient. Additional pertinent items: None    Social History  Social History     Tobacco Use     Smoking status: Current Every Day Smoker     Packs/day: 0.25     Years: 5.00     Pack years: 1.25     Types: Cigarettes     Smokeless tobacco: Never Used     Tobacco comment: due to illness    Substance Use Topics     Alcohol use: Not Currently     Comment: Not since last July 2018     Drug use: Yes     Types:  "Marijuana     Comment: Medical Marijuana currently-- More CBD      Social history was reviewed with the patient. Additional pertinent items: None    Review of Systems   Constitutional: Negative for fever.   Respiratory: Negative for shortness of breath.    Cardiovascular: Negative for chest pain.   Gastrointestinal: Positive for nausea (resolved) and rectal pain. Negative for abdominal pain and vomiting.   Genitourinary: Negative for decreased urine volume, difficulty urinating, dysuria, frequency, hematuria and urgency.   All other systems reviewed and are negative.        Physical Exam   BP: 132/74  Heart Rate: 127  Temp: 98.6  F (37  C)  Resp: 16  Height: 154.9 cm (5' 1\")  SpO2: 96 %  Physical Exam  Constitutional:       General: She is not in acute distress.     Appearance: Normal appearance. She is obese.   HENT:      Head: Normocephalic and atraumatic.      Nose: Nose normal.      Mouth/Throat:      Mouth: Mucous membranes are moist.   Eyes:      Extraocular Movements: Extraocular movements intact.   Neck:      Musculoskeletal: Normal range of motion.   Cardiovascular:      Rate and Rhythm: Regular rhythm. Tachycardia present.      Heart sounds: Normal heart sounds.   Pulmonary:      Effort: Pulmonary effort is normal.      Breath sounds: Normal breath sounds. No wheezing or rales.   Abdominal:      General: Abdomen is flat.      Palpations: Abdomen is soft.      Tenderness: There is no abdominal tenderness. There is no guarding or rebound.   Genitourinary:     Comments: Rectal exam shows no perirectal drainage I do not palpate any fluctuance or abscess on rectal exam.  There is no surrounding redness.  There is slight skin changes externally that could be ecchymosis or have been signs that she had slight bleeding.  No obvious fissure and no overlying cellulitic changes  Musculoskeletal: Normal range of motion.   Skin:     General: Skin is warm.   Neurological:      General: No focal deficit present.      " Mental Status: She is alert and oriented to person, place, and time.         ED Course      Procedures     8:12 AM  The patient was seen and examined by Pasha Bentley MD in Room ED23.            No results found for any visits on 04/01/20.  Medications - No data to display     Assessments & Plan (with Medical Decision Making)   Patient nontoxic in no acute distress presents for concern for perirectal abscess.  She has been seen multiple times for this.  I do not palpate an abscess.  Reviewed previous visits and charts.  Concern for drug-seeking behavior she continues to ask for narcotic medication.  She is also not followed up with the appropriate specialist.  Her labs that have been done over the past couple of weeks have been completely normal no elevation in white count or CRP.  She is afebrile.  It appears that she has been tachycardic with multiple visits this could be her baseline.  Suspect that her depression and psychiatric disease could be contributing to her chronic symptoms.  But ultimately she needs to follow-up with her specialists which she has not done and reports her visits keep being canceled.  Based on her afebrile appearance benign abdominal exam and her rectal exam and reviewed her multiple recent ER visits along with recent labs I do not feel repeat labs or imaging will be of any benefit or give any answer.  She has Zofran already for home.  This is a chronic issue that do not feel is emergent and requires any further testing at this time.  Discussed with the patient that she needs to follow-up with GI and colorectal surgery to have evaluation for any possible definitive care otherwise if this is deemed a chronic issue she needs to see pain management.  She is stable for discharge and outpatient follow-up.  Discussed signs and symptoms to return such as fever    I have reviewed the nursing notes. I have reviewed the findings, diagnosis, plan and need for follow up with the patient.    Discharge  Medication List as of 4/1/2020  8:35 AM          Final diagnoses:   Chronic abdominal pain       --  I, Gamal Douglas, am serving as a trained medical scribe to document services personally performed by Pasha Bentley MD, based on the provider's statements to me.     IPasha MD, was physically present and have reviewed and verified the accuracy of this note documented by Gamal Douglas.    [facility], EMERGENCY DEPARTMENT  4/1/2020     Pasha Bentley MD  04/01/20 [id number]

## 2020-04-01 NOTE — ED AVS SNAPSHOT
[facility], [place], Emergency Department  [address] [address]  Phone:  [phone]                                    Katy Islas   MRN: [id number]    Department:  [facility], Emergency Department   Date of Visit:  4/1/2020           After Visit Summary Signature Page    I have received my discharge instructions, and my questions have been answered. I have discussed any challenges I see with this plan with the nurse or doctor.    ..........................................................................................................................................  Patient/Patient Representative Signature      ..........................................................................................................................................  Patient Representative Print Name and Relationship to Patient    ..................................................               ................................................  Date                                   Time    ..........................................................................................................................................  Reviewed by Signature/Title    ...................................................              ..............................................  Date                                               Time          22EPIC Rev 08/18

## 2020-04-02 ENCOUNTER — PATIENT OUTREACH (OUTPATIENT)
Dept: CARE COORDINATION | Facility: CLINIC | Age: 30
End: 2020-04-02

## 2020-04-02 NOTE — PROGRESS NOTES
Clinic Care Coordination Contact    Situation: Patient chart reviewed by care coordinator.    Background: patient appears on ER follow up report with 81% readmission risk and 18 ER visits within the [facility] system.     Assessment: patient was at [facility] ER for chronic abdominal pain 4/1/20. CC RN has attempted to reach patient since 7/1/2019 8x via phone, sent  [employer] chart message and letters via postal mail with out return call or follow up. CC RN last reached out to patient 3/25/20 without return call.     Plan/Recommendations: CC RN will not make follow up at this time.   FYI to PCP.     EDYTA OttoN RN Clinic Care Coordinator   [place], [place], [place]  Phone: [phone]

## 2020-04-03 ENCOUNTER — TELEPHONE (OUTPATIENT)
Dept: SURGERY | Facility: CLINIC | Age: 30
End: 2020-04-03

## 2020-04-03 NOTE — TELEPHONE ENCOUNTER
[facility] Call Center    Phone Message    May a detailed message be left on voicemail: yes     Reason for Call: Other: Pt needs appt for abscess, asap. Please review and call pt.      Action Taken: Message routed to:  [facility] ([facility] CRS    Travel Screening: Not Applicable

## 2020-04-03 NOTE — TELEPHONE ENCOUNTER
Patient called back and states she has had increasing pain for the past two months. Advised patient that our providers are gone for the day and that we would be able to see her on Monday with our NP. Patient was disconnected prior to discussing appointment information.

## 2020-04-04 ENCOUNTER — APPOINTMENT (OUTPATIENT)
Dept: GENERAL RADIOLOGY | Facility: CLINIC | Age: 30
End: 2020-04-04
Attending: EMERGENCY MEDICINE
Payer: MEDICARE

## 2020-04-04 ENCOUNTER — HOSPITAL ENCOUNTER (EMERGENCY)
Facility: CLINIC | Age: 30
Discharge: HOME | End: 2020-04-04
Attending: EMERGENCY MEDICINE | Admitting: EMERGENCY MEDICINE
Payer: MEDICARE

## 2020-04-04 VITALS
RESPIRATION RATE: 18 BRPM | SYSTOLIC BLOOD PRESSURE: 148 MMHG | DIASTOLIC BLOOD PRESSURE: 97 MMHG | OXYGEN SATURATION: 99 % | TEMPERATURE: 98.2 F | HEART RATE: 87 BPM

## 2020-04-04 DIAGNOSIS — G89.4 CHRONIC PAIN SYNDROME: ICD-10-CM

## 2020-04-04 DIAGNOSIS — G89.29 CHRONIC ABDOMINAL PAIN: ICD-10-CM

## 2020-04-04 DIAGNOSIS — J06.9 VIRAL URI WITH COUGH: ICD-10-CM

## 2020-04-04 DIAGNOSIS — K50.118 CROHN'S DISEASE OF COLON WITH OTHER COMPLICATION (H): ICD-10-CM

## 2020-04-04 DIAGNOSIS — R11.2 NAUSEA AND VOMITING, INTRACTABILITY OF VOMITING NOT SPECIFIED, UNSPECIFIED VOMITING TYPE: ICD-10-CM

## 2020-04-04 DIAGNOSIS — R10.9 CHRONIC ABDOMINAL PAIN: ICD-10-CM

## 2020-04-04 LAB
ALBUMIN SERPL-MCNC: 3.8 G/DL (ref 3.4–5)
ALBUMIN UR-MCNC: NEGATIVE MG/DL
ALP SERPL-CCNC: 95 U/L (ref 40–150)
ALT SERPL W P-5'-P-CCNC: 30 U/L (ref 0–50)
ANION GAP SERPL CALCULATED.3IONS-SCNC: 4 MMOL/L (ref 3–14)
APPEARANCE UR: CLEAR
AST SERPL W P-5'-P-CCNC: 23 U/L (ref 0–45)
BASOPHILS # BLD AUTO: 0 10E9/L (ref 0–0.2)
BASOPHILS NFR BLD AUTO: 0.4 %
BILIRUB SERPL-MCNC: 0.2 MG/DL (ref 0.2–1.3)
BILIRUB UR QL STRIP: NEGATIVE
BUN SERPL-MCNC: 15 MG/DL (ref 7–30)
CALCIUM SERPL-MCNC: 8.8 MG/DL (ref 8.5–10.1)
CHLORIDE SERPL-SCNC: 111 MMOL/L (ref 94–109)
CO2 SERPL-SCNC: 28 MMOL/L (ref 20–32)
COLOR UR AUTO: YELLOW
CREAT SERPL-MCNC: 0.9 MG/DL (ref 0.52–1.04)
CRP SERPL-MCNC: <2.9 MG/L (ref 0–8)
DIFFERENTIAL METHOD BLD: ABNORMAL
EOSINOPHIL NFR BLD AUTO: 1.4 %
ERYTHROCYTE [DISTWIDTH] IN BLOOD BY AUTOMATED COUNT: 12.1 % (ref 10–15)
GFR SERPL CREATININE-BSD FRML MDRD: 85 ML/MIN/{1.73_M2}
GLUCOSE SERPL-MCNC: 89 MG/DL (ref 70–99)
GLUCOSE UR STRIP-MCNC: NEGATIVE MG/DL
HCG UR QL: NEGATIVE
HCT VFR BLD AUTO: 39.8 % (ref 35–47)
HGB BLD-MCNC: 13.7 G/DL (ref 11.7–15.7)
HGB UR QL STRIP: NEGATIVE
IMM GRANULOCYTES # BLD: 0 10E9/L (ref 0–0.4)
IMM GRANULOCYTES NFR BLD: 0.5 %
KETONES UR STRIP-MCNC: NEGATIVE MG/DL
LEUKOCYTE ESTERASE UR QL STRIP: NEGATIVE
LYMPHOCYTES # BLD AUTO: 1.8 10E9/L (ref 0.8–5.3)
LYMPHOCYTES NFR BLD AUTO: 32.5 %
MCH RBC QN AUTO: 33.3 PG (ref 26.5–33)
MCHC RBC AUTO-ENTMCNC: 34.4 G/DL (ref 31.5–36.5)
MCV RBC AUTO: 97 FL (ref 78–100)
MONOCYTES # BLD AUTO: 0.7 10E9/L (ref 0–1.3)
MONOCYTES NFR BLD AUTO: 13.2 %
NEUTROPHILS # BLD AUTO: 2.9 10E9/L (ref 1.6–8.3)
NEUTROPHILS NFR BLD AUTO: 52 %
NITRATE UR QL: NEGATIVE
NRBC # BLD AUTO: 0 10*3/UL
NRBC BLD AUTO-RTO: 0 /100
PH UR STRIP: 5 PH (ref 5–7)
PLATELET # BLD AUTO: 292 10E9/L (ref 150–450)
POTASSIUM SERPL-SCNC: 4.2 MMOL/L (ref 3.4–5.3)
PROT SERPL-MCNC: 7.5 G/DL (ref 6.8–8.8)
RBC # BLD AUTO: 4.11 10E12/L (ref 3.8–5.2)
SODIUM SERPL-SCNC: 143 MMOL/L (ref 133–144)
SOURCE: NORMAL
SP GR UR STRIP: 1.02 (ref 1–1.03)
UROBILINOGEN UR STRIP-MCNC: 0 MG/DL (ref 0–2)
WBC # BLD AUTO: 5.6 10E9/L (ref 4–11)

## 2020-04-04 PROCEDURE — 71045 X-RAY EXAM CHEST 1 VIEW: CPT | Mod: TC

## 2020-04-04 PROCEDURE — 99285 EMERGENCY DEPT VISIT HI MDM: CPT | Mod: Z6 | Performed by: EMERGENCY MEDICINE

## 2020-04-04 PROCEDURE — 99285 EMERGENCY DEPT VISIT HI MDM: CPT | Mod: 25 | Performed by: EMERGENCY MEDICINE

## 2020-04-04 PROCEDURE — 86140 C-REACTIVE PROTEIN: CPT | Performed by: EMERGENCY MEDICINE

## 2020-04-04 PROCEDURE — 96361 HYDRATE IV INFUSION ADD-ON: CPT | Performed by: EMERGENCY MEDICINE

## 2020-04-04 PROCEDURE — 81025 URINE PREGNANCY TEST: CPT | Performed by: EMERGENCY MEDICINE

## 2020-04-04 PROCEDURE — 80053 COMPREHEN METABOLIC PANEL: CPT | Performed by: EMERGENCY MEDICINE

## 2020-04-04 PROCEDURE — 25000128 H RX IP 250 OP 636: Performed by: EMERGENCY MEDICINE

## 2020-04-04 PROCEDURE — 96374 THER/PROPH/DIAG INJ IV PUSH: CPT | Performed by: EMERGENCY MEDICINE

## 2020-04-04 PROCEDURE — 96375 TX/PRO/DX INJ NEW DRUG ADDON: CPT | Performed by: EMERGENCY MEDICINE

## 2020-04-04 PROCEDURE — 81003 URINALYSIS AUTO W/O SCOPE: CPT | Performed by: EMERGENCY MEDICINE

## 2020-04-04 PROCEDURE — 25800030 ZZH RX IP 258 OP 636: Performed by: EMERGENCY MEDICINE

## 2020-04-04 PROCEDURE — 74018 RADEX ABDOMEN 1 VIEW: CPT | Mod: TC

## 2020-04-04 PROCEDURE — 85025 COMPLETE CBC W/AUTO DIFF WBC: CPT | Performed by: EMERGENCY MEDICINE

## 2020-04-04 RX ORDER — ONDANSETRON 2 MG/ML
8 INJECTION INTRAMUSCULAR; INTRAVENOUS EVERY 30 MIN PRN
Status: DISCONTINUED | OUTPATIENT
Start: 2020-04-04 | End: 2020-04-04 | Stop reason: HOSPADM

## 2020-04-04 RX ORDER — DIPHENHYDRAMINE HYDROCHLORIDE 50 MG/ML
25 INJECTION INTRAMUSCULAR; INTRAVENOUS ONCE
Status: COMPLETED | OUTPATIENT
Start: 2020-04-04 | End: 2020-04-04

## 2020-04-04 RX ORDER — SODIUM CHLORIDE 9 MG/ML
1000 INJECTION, SOLUTION INTRAVENOUS CONTINUOUS
Status: DISCONTINUED | OUTPATIENT
Start: 2020-04-04 | End: 2020-04-04 | Stop reason: HOSPADM

## 2020-04-04 RX ORDER — ONDANSETRON 4 MG/1
8 TABLET, ORALLY DISINTEGRATING ORAL EVERY 6 HOURS PRN
Qty: 16 TABLET | Refills: 0 | Status: SHIPPED | OUTPATIENT
Start: 2020-04-04 | End: 2020-04-14

## 2020-04-04 RX ADMIN — SODIUM CHLORIDE 1000 ML: 9 INJECTION, SOLUTION INTRAVENOUS at 17:28

## 2020-04-04 RX ADMIN — DIPHENHYDRAMINE HYDROCHLORIDE 25 MG: 50 INJECTION, SOLUTION INTRAMUSCULAR; INTRAVENOUS at 17:40

## 2020-04-04 RX ADMIN — ONDANSETRON 8 MG: 2 INJECTION INTRAMUSCULAR; INTRAVENOUS at 17:40

## 2020-04-04 ASSESSMENT — ENCOUNTER SYMPTOMS
FEVER: 1
COUGH: 1
CHILLS: 1
SHORTNESS OF BREATH: 1
VOMITING: 1
FATIGUE: 1
NAUSEA: 1
ABDOMINAL PAIN: 1

## 2020-04-04 NOTE — ED NOTES
Pt c/o abd pain, n/v x 1 wk with a cough starting today with blood streaks noted.  No coughing while in ED thus far.  Pt c/o generalized abd pain w/o focal point.  Pt has had this pain in the past but has not been able to f/u r/t COVID clinic issues.

## 2020-04-04 NOTE — ED AVS SNAPSHOT
[facility] Emergency Department  [address] [place] [address]  Phone:  [phone]  Fax:  [phone]                                    Katy Islas   MRN: [id number]    Department:  [facility] Emergency Department   Date of Visit:  4/4/2020           After Visit Summary Signature Page    I have received my discharge instructions, and my questions have been answered. I have discussed any challenges I see with this plan with the nurse or doctor.    ..........................................................................................................................................  Patient/Patient Representative Signature      ..........................................................................................................................................  Patient Representative Print Name and Relationship to Patient    ..................................................               ................................................  Date                                   Time    ..........................................................................................................................................  Reviewed by Signature/Title    ...................................................              ..............................................  Date                                               Time          22EPIC Rev 08/18

## 2020-04-04 NOTE — ED PROVIDER NOTES
History     Chief Complaint   Patient presents with     Abdominal Pain     Nausea & Vomiting     Cough     HPI  Katy Islas is a 30 year old female who is to the ER with concerns of cough, hemoptysis, abdominal pain, and vomiting.  Has multiple emergency department visits for similar complaints.  Most recently patient was seen in the ER at [facility] for nausea and rectal pain due to a previously diagnosed rectal abscess.  Today she comes to the ER complaining of nausea, vomiting, cough for the last 4 days, and hemoptysis.  She states that she coughed up a blood clot at one point and continues to cough up red-tinged sputum.  During interview she did have a cough and produced a scant amount of white sputum without any blood streaks or evidence of hemoptysis.  She also thinks that her current medicines are not working to help control her abdominal pain and other symptoms.  She has been trying to get in with her specialist but has been unable to see them due to the decrease in office visits related to with novel coronavirus pandemic.  Chart review I do see the patient reached out to her colorectal surgeon yesterday via telephone, but did not follow through when they offered to see her in the clinic.        Allergies:  Allergies   Allergen Reactions     Iohexol Hives     Other reaction(s): Hives  IV contrast; patient states she tolerates contrast if she gets benadryl before  IV contrast; patient states she tolerates contrast if she gets benadryl before     Ativan [Lorazepam] Hives     At the IV site     Baclofen      hives     Bees Hives, Swelling and Difficulty breathing     Caffeine      Contrast Dye      Hives,   Updated 5/10/2016 CT Contrast.     Iodine Hives     Methocarbamol Swelling     Metoclopramide      Other reaction(s): Tremors  LW Reaction: shaking/sweating     Midazolam      Other reaction(s): Agitation     Monosodium Glutamate      Morphine Other (See Comments)     Difficulty with urination     Nsaids  Other (See Comments)     GI BLEED x2     Other (Do Not Use) Other (See Comments)     Xanaflex- pt becomes disoriented and loses bladder control     Reglan [Metoclopramide Hcl] Other (See Comments)     shaking     Soma [Carisoprodol] Visual Disturbance     Sleep walking     Tizanidine      Topamax Other (See Comments)     Topamax [Topiramate] Nausea     Tingling  GI/Vomit     Tramadol      Severe Headache, Seizure Risk     Tylenol W/Codeine [Acetaminophen-Codeine] Nausea and Itching     Tylenol 3     Versed Other (See Comments)     Zolmitriptan      Makes face feel like its twitching     Droperidol Anxiety     Flu Virus Vaccine Rash      Arm swelling       Problem List:    Patient Active Problem List    Diagnosis Date Noted     Morbid obesity (H) 03/10/2020     Priority: Medium     Abnormal uterine bleeding 02/12/2020     Priority: Medium     Added automatically from request for surgery [id number]       Anal fissure 02/04/2020     Priority: Medium     Low hematocrit 02/04/2020     Priority: Medium     Elevated d-dimer 02/04/2020     Priority: Medium     Hypertension goal BP (blood pressure) < 130/80 08/05/2019     Priority: Medium     Bulge of cervical disc without myelopathy 04/24/2019     Priority: Medium     Cervicalgia 04/17/2019     Priority: Medium     Class 1 obesity due to excess calories without serious comorbidity with body mass index (BMI) of 32.0 to 32.9 in adult 04/03/2019     Priority: Medium     Hypophosphatemia 11/01/2018     Priority: Medium     Patellar maltracking, right 09/05/2018     Priority: Medium     Right anterior knee pain 09/05/2018     Priority: Medium     Melanocytic nevus, unspecified location 07/23/2018     Priority: Medium     Dysplastic skin lesion 07/23/2018     Priority: Medium     Iliotibial band syndrome affecting lower leg, right 12/21/2017     Priority: Medium     Chondromalacia of right patellofemoral joint 12/21/2017     Priority: Medium     Upper GI bleed - suspected 07/01/2017      Priority: Medium     Tobacco use disorder 07/01/2017     Priority: Medium     History of pseudoseizure 07/01/2017     Priority: Medium     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) 03/10/2017     Priority: Medium     Lumbar disc disease with radiculopathy 02/23/2017     Priority: Medium     S/P lumbar fusion 02/23/2017     Priority: Medium     Dr. YOVANI Millan, 2/23/17       Vitamin D deficiency 01/06/2017     Priority: Medium     Atypical mole 01/06/2017     Priority: Medium     Mild persistent asthma without complication 12/02/2016     Priority: Medium     Chronic bilateral low back pain with left-sided sciatica 12/02/2016     Priority: Medium     Bee sting allergy 09/16/2016     Priority: Medium     Gastroesophageal reflux disease without esophagitis 08/26/2016     Priority: Medium     Herpes simplex virus infection 11/12/2015     Priority: Medium     Adjustment disorder with mixed anxiety and depressed mood 10/14/2015     Priority: Medium     Marijuana abuse 02/22/2015     Priority: Medium     Bipolar disorder (H) 01/31/2013     Priority: Medium     Problem list name updated by automated process. Provider to review       Chronic pain 02/09/2012     Priority: Medium     Patient is followed by Aura Rosario PA-C for ongoing prescription of pain medication.  All refills should only be approved by this provider, or covering partner.    Medication(s): Norco    Maximum quantity per month: 70  Clinic visit frequency required: Q 2 months     Controlled substance agreement:   Discussed and signed 4/25/17    Encounter-Level CSA - 01/12/2015:                 Controlled Substance Agreement - Scan on 1/26/2015  9:14 AM : Controlled Medication Agreement-01/12/15 (below)            Pain Clinic evaluation in the past: Yes       Date/Location:   [facility], fall 2016    DIRE Total Score(s):    4/25/2017   Total Score 17       Last MNPMP website verification:  done on 4/25/17 by LOVE Maki    [email or web address]           Anxiety 09/22/2011     Priority: Medium     Mild major depression (H) 08/29/2011     Priority: Medium     Mild intermittent asthma 10/12/2009     Priority: Medium     Overview:   Formatting of this note might be different from the original.  Action plan: See letter 4/10/2013   ATAQ:   Asthma Data 4/10/2013   Date completed 4/10/2013   Missed daily activities no = 0   Wake at night no = 0   Believe asthma in control yes = 0   ARIN use yes   Maximum ARIN use in 1 day 1-4 = 0   ATAQ score 0 = well controlled   Asthma ED visits in past 12 mos 0   Asthma hospitalizations in past 12 mos 0     Current Outpatient Prescriptions   Medication Sig     albuterol (PROVENTIL) 0.083 % neb solution Inhale 3 mL via a nebulizer every 4 hours if needed for Shortness Of Breath.        Crohn's colitis (H) 08/04/2009     Priority: Medium     Dysmenorrhea 05/11/2007     Priority: Medium     Benign neoplasm of skin of lower limb, including hip 03/16/2004     Priority: Medium     Migraine      Priority: Medium     Dr. Farrar - Neurology.  Now on Inderal.  Seems to be working.  Follow-up 9/08  Problem list name updated by automated process. Provider to review       Hearing loss      Priority: Medium     Congenital  Problem list name updated by automated process. Provider to review       Allergic rhinitis      Priority: Medium     Problem list name updated by automated process. Provider to review          Past Medical History:    Past Medical History:   Diagnosis Date     Abdominal pain 10/31- 11/4/2005     Allergic rhinitis, cause unspecified      Arthritis      C. difficile diarrhea      Crohn's disease (H)      Crohn's disease (H)      Depression with anxiety 2003     Esophageal reflux      Grand mal seizure disorder (H) 10/8/2013     Hypertension      Intestinal infection due to Clostridium difficile 10/00     Localization-related (focal) (partial) epilepsy and epileptic syndromes with simple partial  seizures, without mention of intractable epilepsy      Migraine 07/21/12     Migraine, unspecified, without mention of intractable migraine without mention of status migrainosus      Mild intermittent asthma      Mycoplasma infection in conditions classified elsewhere and of unspecified site      Other chronic pain      Renal disease      Unspecified hearing loss        Past Surgical History:    Past Surgical History:   Procedure Laterality Date     AS REMOVAL OF COCCYX  10/18/2017     C FUSION OF SACROILIAC JOINT  10/26/2018     COLONOSCOPY  7/1/2009    [facility].     COLONOSCOPY N/A 7/17/2019    Procedure: Colonoscopy, With Polypectomy And Biopsy;  Surgeon: Edwin Conde MD;  Location: [facility] OR     COLONOSCOPY WITH CO2 INSUFFLATION N/A 7/17/2019    Procedure: COLONOSCOPY, WITH CO2 INSUFFLATION;  Surgeon: Edwin Conde MD;  Location: [facility] OR     COMBINED ESOPHAGOSCOPY, GASTROSCOPY, DUODENOSCOPY (EGD) WITH CO2 INSUFFLATION N/A 4/12/2019    Procedure: COMBINED ESOPHAGOSCOPY, GASTROSCOPY, DUODENOSCOPY (EGD) WITH CO2 INSUFFLATION;  Surgeon: Edwin Conde MD;  Location: [facility] OR     ESOPHAGOSCOPY, GASTROSCOPY, DUODENOSCOPY (EGD), COMBINED N/A 4/12/2019    Procedure: Combined Esophagoscopy, Gastroscopy, Duodenoscopy (Egd), Biopsy Single Or Multiple;  Surgeon: Edwin Conde MD;  Location: [facility] OR     FUSION SPINE POSTERIOR MINIMALLY INVASIVE ONE LEVEL N/A 2/23/2017    Procedure: FUSION SPINE POSTERIOR MINIMALLY INVASIVE ONE LEVEL;  L4-5 Oblique Lateral Lumbar Interbody Fusion   Epidural steroid injection.   Transpedicular Bone marrow aspiration;  Surgeon: Jeniffer Eugene MD;  Location: [facility] OR     HC COLONOSCOPY THRU STOMA WITH BIOPSY  10/29/2003    Impression is that of normal appearing colonoscopy, without evidence of rectal bleeding.     HC COLONOSCOPY THRU STOMA, DIAGNOSTIC  10/00    normal     HC COLONOSCOPY THRU STOMA, DIAGNOSTIC  Oct 2009      [place]- normal     HC EEG AWAKE AND SLEEP      abnormal     HC MRI BRAIN W/O CONTRAST  12/00    normal     HC REMOVAL GALLBLADDER  8/5/2009    [facility], [place].     HC UGI ENDOSCOPY DIAG W BIOPSY  11/11/09    Normal esophagus     HC UGI ENDOSCOPY, SIMPLE EXAM  7/00, 10/00    mild chronic esophagitis and duodenitis, neg H pylori     HC UGI ENDOSCOPY, SIMPLE EXAM  01/20/2005    Esophagogastroduodenoscopy, colonoscopy with biopsies.  [facility]     HC UGI ENDOSCOPY, SIMPLE EXAM  7/1/2009    [facility], [facility].     HC UGI ENDOSCOPY, SIMPLE EXAM  11/11/2009    attempted upper GI, pt. could not tolerate procedure:[facility]     ORTHOPEDIC SURGERY  October 19,2011    diskectomy L4-L5       Family History:    Family History   Problem Relation Age of Onset     Gastrointestinal Disease Brother         severe Crohn's     Neurologic Disorder Brother         Seizures post head injury     Depression Brother      Substance Abuse Brother      Genitourinary Problems Father         kidney stones     Diabetes Father      Heart Disease Father         Open heart surgery     Breast Cancer Maternal Grandmother      Parkinsonism Maternal Grandmother      Cerebrovascular Disease Paternal Grandmother      Cancer Maternal Grandfather         Lung     Cardiovascular Paternal Grandfather         Heart Attack     Substance Abuse Mother         in recovery x1 year      Lung Cancer Paternal Uncle      Cancer Paternal Uncle      Lung Cancer Maternal Uncle        Social History:  Marital Status:  Single [1]  Social History     Tobacco Use     Smoking status: Current Every Day Smoker     Packs/day: 0.25     Years: 5.00     Pack years: 1.25     Types: Cigarettes     Smokeless tobacco: Never Used     Tobacco comment: due to illness    Substance Use Topics     Alcohol use: Not Currently     Comment: Not since last July 2018     Drug use: Yes     Types: Marijuana     Comment: Medical Marijuana  currently-- More CBD        Medications:    acetaminophen (TYLENOL) 500 MG tablet  albuterol (PROAIR HFA/PROVENTIL HFA/VENTOLIN HFA) 108 (90 Base) MCG/ACT inhaler  albuterol (PROVENTIL) (2.5 MG/3ML) 0.083% neb solution  alum & mag hydroxide-simethicone (MAALOX ADVANCED MAX ST) 400-400-40 MG/5ML SUSP suspension  ARIPiprazole (ABILIFY) 15 MG tablet  aspirin (ASA) 81 MG tablet  azelastine (OPTIVAR) 0.05 % ophthalmic solution  buPROPion (WELLBUTRIN XL) 150 MG 24 hr tablet  busPIRone (BUSPAR) 15 MG tablet  clonazePAM (KLONOPIN) 1 MG tablet  cyclobenzaprine (FLEXERIL) 10 MG tablet  diphenhydrAMINE (BENADRYL) 50 MG capsule  DULoxetine (CYMBALTA) 60 MG EC capsule  EPINEPHrine (ANY BX GENERIC EQUIV) 0.3 MG/0.3ML injection 2-pack  fexofenadine (ALLEGRA) 180 MG tablet  gabapentin (NEURONTIN) 300 MG capsule  lidocaine (XYLOCAINE) 2 % solution  losartan (COZAAR) 100 MG tablet  medical cannabis (Patient's own supply)  medroxyPROGESTERone (DEPO-PROVERA) 150 MG/ML IM injection  methocarbamol (ROBAXIN) 750 MG tablet  metoprolol succinate ER (TOPROL-XL) 200 MG 24 hr tablet  neomycin-polymyxin-hydrocortisone (CORTISPORIN) 3.5-54847-0 otic suspension  NONFORMULARY  pantoprazole (PROTONIX) 40 MG EC tablet  prazosin (MINIPRESS) 1 MG capsule  prochlorperazine (COMPAZINE) 10 MG tablet  rizatriptan (MAXALT-MLT) 5 MG ODT  sucralfate (CARAFATE) 1 GM tablet  valACYclovir (VALTREX) 1000 mg tablet          Review of Systems   Constitutional: Positive for chills, fatigue and fever.   Respiratory: Positive for cough and shortness of breath.    Gastrointestinal: Positive for abdominal pain, nausea and vomiting.   All other systems reviewed and are negative.      Physical Exam          Physical Exam  Vitals signs and nursing note reviewed.   Constitutional:       General: She is not in acute distress.     Appearance: She is obese. She is not diaphoretic.   HENT:      Head: Normocephalic and atraumatic.      Mouth/Throat:      Mouth: Mucous membranes  are moist.      Pharynx: No pharyngeal swelling or oropharyngeal exudate.   Cardiovascular:      Rate and Rhythm: Normal rate.   Pulmonary:      Effort: Pulmonary effort is normal.      Comments: Coughed during exam, productive of scant amount of white sputum, no hematemesis or hemoptysis  Abdominal:      Palpations: Abdomen is soft.      Tenderness: There is generalized abdominal tenderness.      Comments: Generalized tenderness but worse in left lower quadrant, no rebound, no guarding   Skin:     General: Skin is warm and dry.   Neurological:      Mental Status: She is alert.         ED Course        Procedures               Critical Care time:  none               Results for orders placed or performed during the hospital encounter of 04/04/20 (from the past 24 hour(s))   CBC with platelets differential   Result Value Ref Range    WBC 5.6 4.0 - 11.0 10e9/L    RBC Count 4.11 3.8 - 5.2 10e12/L    Hemoglobin 13.7 11.7 - 15.7 g/dL    Hematocrit 39.8 35.0 - 47.0 %    MCV 97 78 - 100 fl    MCH 33.3 (H) 26.5 - 33.0 pg    MCHC 34.4 31.5 - 36.5 g/dL    RDW 12.1 10.0 - 15.0 %    Platelet Count 292 150 - 450 10e9/L    Diff Method Automated Method     % Neutrophils 52.0 %    % Lymphocytes 32.5 %    % Monocytes 13.2 %    % Eosinophils 1.4 %    % Basophils 0.4 %    % Immature Granulocytes 0.5 %    Nucleated RBCs 0 0 /100    Absolute Neutrophil 2.9 1.6 - 8.3 10e9/L    Absolute Lymphocytes 1.8 0.8 - 5.3 10e9/L    Absolute Monocytes 0.7 0.0 - 1.3 10e9/L    Absolute Basophils 0.0 0.0 - 0.2 10e9/L    Abs Immature Granulocytes 0.0 0 - 0.4 10e9/L    Absolute Nucleated RBC 0.0    Comprehensive metabolic panel   Result Value Ref Range    Sodium 143 133 - 144 mmol/L    Potassium 4.2 3.4 - 5.3 mmol/L    Chloride 111 (H) 94 - 109 mmol/L    Carbon Dioxide 28 20 - 32 mmol/L    Anion Gap 4 3 - 14 mmol/L    Glucose 89 70 - 99 mg/dL    Urea Nitrogen 15 7 - 30 mg/dL    Creatinine 0.90 0.52 - 1.04 mg/dL    GFR Estimate 85 >60 mL/min/[1.73_m2]     GFR Estimate If Black >90 >60 mL/min/[1.73_m2]    Calcium 8.8 8.5 - 10.1 mg/dL    Bilirubin Total 0.2 0.2 - 1.3 mg/dL    Albumin 3.8 3.4 - 5.0 g/dL    Protein Total 7.5 6.8 - 8.8 g/dL    Alkaline Phosphatase 95 40 - 150 U/L    ALT 30 0 - 50 U/L    AST 23 0 - 45 U/L   CRP inflammation   Result Value Ref Range    CRP Inflammation <2.9 0.0 - 8.0 mg/L   UA reflex to Microscopic   Result Value Ref Range    Color Urine Yellow     Appearance Urine Clear     Glucose Urine Negative NEG^Negative mg/dL    Bilirubin Urine Negative NEG^Negative    Ketones Urine Negative NEG^Negative mg/dL    Specific Gravity Urine 1.023 1.003 - 1.035    Blood Urine Negative NEG^Negative    pH Urine 5.0 5.0 - 7.0 pH    Protein Albumin Urine Negative NEG^Negative mg/dL    Urobilinogen mg/dL 0.0 0.0 - 2.0 mg/dL    Nitrite Urine Negative NEG^Negative    Leukocyte Esterase Urine Negative NEG^Negative    Source Midstream Urine    HCG qualitative urine   Result Value Ref Range    HCG Qual Urine Negative NEG^Negative   XR Chest Port 1 View    Narrative    CHEST PORTABLE ONE VIEW   4/4/2020 6:37 PM     HISTORY: Cough, fever.    COMPARISON: Chest x-rays dated 2/19/2020, 10/1/2019, 7/20/2019 and  11/7/2016.    FINDINGS:  Very slightly increased interstitial markings in bilateral  lungs as compared to prior studies could represent mild vascular  congestion or crowding. Lungs are mildly hypoaerated. Atypical  interstitial infiltrates are considered less likely but are not  excluded. Further evaluation with noncontrast CT chest may be helpful.  Heart is normal in size. Mediastinum and pulmonary vascularity are  otherwise within normal limits. No pneumothorax, significant pleural  fluid collection, or acute osseous fracture is seen.      Impression    IMPRESSION:. Slightly increased interstitial markings in bilateral  lungs as compared to the prior studies could represent minimal  vascular congestion, atelectasis, vascular crowding, or atypical  interstitial  infiltrates. Further evaluation with noncontrast CT chest  may be helpful as clinically indicated.   XR Abdomen Port 1 View    Narrative    ABDOMEN PORTABLE ONE VIEW   4/4/2020 6:38 PM     HISTORY: Abdominal pain, vomiting.    COMPARISON: CT abdomen and pelvis dated 3/20/2020.      FINDINGS:  There is a relative paucity of bowel gas causing a  nonspecific bowel gas pattern. No definite gas filled distended loops  of bowel are seen. Postop changes lower lumbar spine and left  sacroiliac joint are again noted and are stable in appearance. No  definite renal or ureteral calculus is seen.      Impression    IMPRESSION:  1. Nonspecific bowel gas pattern without definite evidence for bowel  obstruction. A subtle bowel obstruction is possible given the paucity  of bowel gas.  2. Postop changes lower lumbar spine and left SI joint as described.  3. No definite renal or ureteral calculus.     *Note: Due to a large number of results and/or encounters for the requested time period, some results have not been displayed. A complete set of results can be found in Results Review.       Medications   0.9% sodium chloride BOLUS (0 mLs Intravenous Stopped 4/4/20 1922)     Followed by   sodium chloride 0.9% infusion (has no administration in time range)   ondansetron (ZOFRAN) injection 8 mg (8 mg Intravenous Given 4/4/20 1740)   diphenhydrAMINE (BENADRYL) injection 25 mg (25 mg Intravenous Given 4/4/20 1740)       Assessments & Plan (with Medical Decision Making)  Katy is a 30-year-old female with past medical history of Crohn's disease, bipolar disorder, chronic abdominal pain, obesity, and drug-seeking behavior, who presents to the ER with cough and abdominal pain.  She states that the cough is been ongoing for the last 4 days and she did cough up a blood clot at one time.  She feels that her abdominal pain is worse and she has been vomiting as well.  See history and physical exam as above  Vital signs are stable on arrival,  patient is afebrile and in no acute distress.  She did cough and produce white sputum, no evidence of hemoptysis.  Using the  via video chat, able to obtain history and do a physical exam.  Told patient that we will place an IV and give her Zofran and Benadryl at her request, we will also give IV fluids.  We will draw general labs and do chest x-ray and KUB.  I think that this may be a viral illness, and the patient is known to have chronic abdominal problems for which she has not been able to follow-up with her specialists.  Does admit to tenderness on palpation of abdomen, specifically worse in the left lower quadrant, but belly is soft and no fluid wave or masses appreciated.  Lab results as above, all within normal limits.  Chest x-ray shows slight increased interstitial markings bilateral lungs, but otherwise no acute process.  Oxygen saturation is 99% and above on room air and patient is in no respiratory distress.  KUB shows no intraperitoneal free air.  All her findings may be consistent with infection with novel coronavirus, however the patient appears stable, vital signs have remained stable, she has not had any emesis duration of ED stay, and has repeatedly requested narcotic pain medication.  She does have a history of drug-seeking behavior.  I did go back and explained the findings to the patient.  She requested a refill for Zofran since she has run out and has been unable to get into see her primary provider.  I state that I can provide her with a few doses as a refill, but the ER does not fill long-term prescriptions.  She also said that Norco helps with her abdominal pain and vomiting sometimes, but I do not believe that narcotic pain medications are appropriate for this patient and I told her that I will not give her prescription for this.  She was agreeable to that and okay with plan for discharge at this time.  I also encouraged her to continue reaching out to her primary provider  and her specialist to see if an office follow-up would be manageable during this time where limited office visits are being conducted due to her history.  She understands and agrees.  IV is removed and she is discharged from the ED in stable condition, no acute distress.     I have reviewed the nursing notes.    I have reviewed the findings, diagnosis, plan and need for follow up with the patient.       Discharge Medication List as of 4/4/2020  7:25 PM          Final diagnoses:   Viral URI with cough   Crohn's disease of colon with other complication (H)   Chronic pain syndrome   Nausea and vomiting, intractability of vomiting not specified, unspecified vomiting type   Chronic abdominal pain       4/4/2020   [facility] EMERGENCY DEPARTMENT     Sweta Tong DO  04/04/20 2025

## 2020-04-05 NOTE — DISCHARGE INSTRUCTIONS
Your work-up today was reassuring, your blood work looks normal and your x-rays did not show any sign of pneumonia or abdominal problem    Even though it is frustrating, I would recommend that you continue to try to follow-up with your primary provider and specialists as previously directed.  They may be able to figure something out to arrange an office visit for you even in these difficult times when office visits are limited    A new prescription for Zofran was sent to your pharmacy in [place].  Contact your primary provider for any further refills.  The ER does not refill long-term medications    Take Tylenol and ibuprofen as needed for any fever, aches, or pains.    Start eating a clear liquid diet and advance as you are able to tolerate    Return to the ER if you have any new or concerning

## 2020-04-06 ENCOUNTER — PATIENT OUTREACH (OUTPATIENT)
Dept: CARE COORDINATION | Facility: CLINIC | Age: 30
End: 2020-04-06

## 2020-04-06 ASSESSMENT — ACTIVITIES OF DAILY LIVING (ADL): DEPENDENT_IADLS:: INDEPENDENT

## 2020-04-06 NOTE — PROGRESS NOTES
Clinic Care Coordination Contact    Clinic Care Coordination Contact  OUTREACH    Referral Information:  Referral Source: ED Follow-Up    Primary Diagnosis: Other (include Comment box)(chronic abd pain)    Chief Complaint   Patient presents with     Clinic Care Coordination - Initial        Universal Utilization: patient appears on ER follow up report with 81% readmission risk and 19 ER visits within the  system  Clinic Utilization  Difficulty keeping appointments:: Yes  Compliance Concerns: Yes  No-Show Concerns: Yes  No PCP office visit in Past Year: No  Utilization    Last refreshed: 4/6/2020  8:06 AM:  Hospital Admissions 1           Last refreshed: 4/6/2020  8:06 AM:  ED Visits 18           Last refreshed: 4/6/2020  8:06 AM:  No Show Count (past year) 23              Current as of: 4/6/2020  8:06 AM              Clinical Concerns:  Current Medical Concerns:  Patient was seen at [facility] Emergency Department for viral URI and abdominal pain. Called patient introduced self and role. Patient stated she has copy of discharge summary, and is planning to follow up with PCP and GI. Patient was offered an appointment with GI 4/6/20, but patient was disconnected prior to scheduling this appointment (see 4/3/20 telephone message). Patient had no further questions and was not open to further discussion.     Current Behavioral Concerns: limited conversation.     Education Provided to patient: RN CC educated about Care Coordination Services, discharge instructions, and follow up    Pain  Pain (GOAL):: Yes  Health Maintenance Reviewed: Not assessed  Clinical Pathway: None    Medication Management:  Not discussed      Functional Status:  Dependent ADLs:: Independent  Dependent IADLs:: Independent  Bed or wheelchair confined:: No  Mobility Status: Independent    Living Situation:  Current living arrangement:: Not Assessed    Lifestyle & Psychosocial Needs:        Diet:: Regular  Inadequate nutrition (GOAL)::  No  Tube Feeding: No  Inadequate activity/exercise (GOAL):: No  Significant changes in sleep pattern (GOAL): No  Transportation means:: Regular car     Pentecostal or spiritual beliefs that impact treatment:: No  Mental health DX:: Yes  Mental health management concern (GOAL):: No  Informal Support system:: Family, Parent   Socioeconomic History     Marital status: Single     Spouse name: Not on file     Number of children: Not on file     Years of education: Not on file     Highest education level: Not on file     Tobacco Use     Smoking status: Current Every Day Smoker     Packs/day: 0.25     Years: 5.00     Pack years: 1.25     Types: Cigarettes     Smokeless tobacco: Never Used     Tobacco comment: due to illness    Substance and Sexual Activity     Alcohol use: Not Currently     Comment: Not since last July 2018     Drug use: Yes     Types: Marijuana     Comment: Medical Marijuana currently-- More CBD     Sexual activity: Not Currently     Partners: Male     Birth control/protection: Condom, Injection        Resources and Interventions:  Current Resources: discharge summary and scheduling phone #'s     Community Resources: County Worker, Other (see comment)(Disability)  Supplies used at home:: Nebulizer tubing  Equipment Currently Used at Home: none    Advance Care Plan/Directive  Advanced Care Plans/Directives on file:: No    Referrals Placed: None     Goals: na      Patient/Caregiver understanding: yes       Future Appointments              In 10 months Sandra Hou MD [facility]          Plan: 1. Patient will follow discharge summary.  2. Patient will follow up sooner should symptoms worsen, change or patient feels there is a need further care.  3.  Care Coordination needs identified at this time, patient not engaged in conversation/services. Patient may be referred to Care Coordination in the future if additional needs arise. No follow-up planned.    RADHA Otto  RN Clinic Care Coordinator   [place], [place], [place]  Phone: [phone]

## 2020-04-08 ENCOUNTER — TELEPHONE (OUTPATIENT)
Dept: FAMILY MEDICINE | Facility: OTHER | Age: 30
End: 2020-04-08

## 2020-04-08 ENCOUNTER — TELEPHONE (OUTPATIENT)
Dept: GASTROENTEROLOGY | Facility: CLINIC | Age: 30
End: 2020-04-08

## 2020-04-08 NOTE — TELEPHONE ENCOUNTER
Spoke to pt. She is feeling nauseated but no longer vomiting. Pt was able to eat once today, had eggs for breakfast.  She has been having diarrhea x 3 days. Unsure if it could be something she ate.   Day #1 had cramps, lots of pain, did get her period as well. Had diarrhea 10 times, and it is to the point of being incontinent  Day #2 unsure of the number of episodes of diarrhea  Pt has had 6 episodes of diarrhea today.  Today is day #3 of diarrhea  Pt has not had much of an appetite today.  Pt is not having blood in the stool, but does note blood when she wipes, she is unsure if this is related to her period.  We may leave a detailed message for pt.    Yarelis Abbasi RN  Gastroenterology Care Coordinator  [place], [place]

## 2020-04-08 NOTE — TELEPHONE ENCOUNTER
Please call patient     I reviewed x-rays. The GI doctor can decide if she needs repeat imaging, but what I see is minimally findings so no need to be repeated unless she doesn't get better.     Chest x-ray - about the same. Minimal findings, likely what we are seeing is from her asthma. Would only repeat if she doesn't get better in 1-2 weeks.    Zach Rosario PA-C  [facility]

## 2020-04-08 NOTE — TELEPHONE ENCOUNTER
Patient calling about recent chest xray's - she feels like there is more going on than what is showing.     Would like to know when she can have a repeat xray.    Started her menstrual cycle today and is in a lot of pain from that as well.     States she has cough and itching on right side of lungs.  This is why she would like to come in for repeat imaging.     Patient states she is going to call the GI dr as well to talk about abdominal pain.        [phone]  Ok to leave message

## 2020-04-08 NOTE — TELEPHONE ENCOUNTER
[facility] Call Center    Phone Message    May a detailed message be left on voicemail: yes     Reason for Call: Symptoms or Concerns     If patient has red-flag symptoms, warm transfer to triage line    Current symptom or concern: diarrhea, blood in stool, mucus in stool, a lot of watered down stool, vomiting.    Symptoms have been present for:  4 days for vomiting , diarrhea for 2 days. Not vomiting now.     Has patient previously been seen for this? Looks like patient reached out to PCP as well, 4/8 encounter.      Patient wants to do a stool sample.       Action Taken: Message routed to:  Adult Clinics: Gastroenterology (GI) p [id number]

## 2020-04-08 NOTE — TELEPHONE ENCOUNTER
For albuterol, there is no difference between the many different generic versions or band. They work the same.     If she wants a different inhaler (like a daily inhaler) I recommend E-visit.     Zach Rosario PA-C  [facility]

## 2020-04-08 NOTE — TELEPHONE ENCOUNTER
Patient informed of results. She states that she is having bleeding mucus watery diarrhea. She is going to contact her GI provider. She is also wondering about a different inhaler. Generic vs brand name. Uses [employer].

## 2020-04-08 NOTE — TELEPHONE ENCOUNTER
Patient notified, patient is wondering if bupropion causes GI upset? Ok to send a message through [employer]

## 2020-04-09 ENCOUNTER — TELEPHONE (OUTPATIENT)
Dept: FAMILY MEDICINE | Facility: OTHER | Age: 30
End: 2020-04-09

## 2020-04-09 NOTE — TELEPHONE ENCOUNTER
Per Dr. Conde   Suggest staying well hydrated with electrolyte drink (Gatorade or Pedialyte)     Ok to use zofran and imodium for symptomatic relief of symptoms.         Left detailed VM per patients request at last call.  Pt welcome to call back with any questions.    Yarelis Abbasi RN  Gastroenterology Care Coordinator  [place], [place]

## 2020-04-09 NOTE — TELEPHONE ENCOUNTER
Reason for call:  Patient reporting a symptom    Symptom or request: cough and low grade temp and sore throat    Duration (how long have symptoms been present): today    Have you been treated for this before? No    Additional comments: Patient declined doing oncare, I also tried to set her up for a appt at the urgent care and she declined. It is to far away for her.    Phone Number patient can be reached at:  [phone]  Best Time:      Can we leave a detailed message on this number:  NO    Call taken on 4/9/2020 at 12:44 PM by Atiya Borrego

## 2020-04-11 ENCOUNTER — HOSPITAL ENCOUNTER (EMERGENCY)
Facility: CLINIC | Age: 30
Discharge: HOME | End: 2020-04-11
Attending: EMERGENCY MEDICINE | Admitting: EMERGENCY MEDICINE
Payer: MEDICARE

## 2020-04-11 ENCOUNTER — NURSE TRIAGE (OUTPATIENT)
Dept: NURSING | Facility: CLINIC | Age: 30
End: 2020-04-11

## 2020-04-11 VITALS
HEART RATE: 109 BPM | WEIGHT: 194 LBS | TEMPERATURE: 99 F | SYSTOLIC BLOOD PRESSURE: 129 MMHG | BODY MASS INDEX: 36.63 KG/M2 | DIASTOLIC BLOOD PRESSURE: 79 MMHG | HEIGHT: 61 IN | OXYGEN SATURATION: 93 % | RESPIRATION RATE: 16 BRPM

## 2020-04-11 DIAGNOSIS — R50.9 FEVER AND CHILLS: ICD-10-CM

## 2020-04-11 DIAGNOSIS — K62.89 RECTAL PAIN: ICD-10-CM

## 2020-04-11 LAB
ALBUMIN SERPL-MCNC: 4 G/DL (ref 3.4–5)
ALBUMIN UR-MCNC: 10 MG/DL
ALP SERPL-CCNC: 98 U/L (ref 40–150)
ALT SERPL W P-5'-P-CCNC: 59 U/L (ref 0–50)
ANION GAP SERPL CALCULATED.3IONS-SCNC: 6 MMOL/L (ref 3–14)
APPEARANCE UR: CLEAR
AST SERPL W P-5'-P-CCNC: 29 U/L (ref 0–45)
BASOPHILS # BLD AUTO: 0 10E9/L (ref 0–0.2)
BASOPHILS NFR BLD AUTO: 0.4 %
BILIRUB SERPL-MCNC: 0.2 MG/DL (ref 0.2–1.3)
BILIRUB UR QL STRIP: NEGATIVE
BUN SERPL-MCNC: 16 MG/DL (ref 7–30)
CALCIUM SERPL-MCNC: 8.9 MG/DL (ref 8.5–10.1)
CHLORIDE SERPL-SCNC: 107 MMOL/L (ref 94–109)
CO2 SERPL-SCNC: 26 MMOL/L (ref 20–32)
COLOR UR AUTO: YELLOW
CREAT SERPL-MCNC: 0.85 MG/DL (ref 0.52–1.04)
DIFFERENTIAL METHOD BLD: NORMAL
EOSINOPHIL # BLD AUTO: 0.1 10E9/L (ref 0–0.7)
EOSINOPHIL NFR BLD AUTO: 0.6 %
ERYTHROCYTE [DISTWIDTH] IN BLOOD BY AUTOMATED COUNT: 12.3 % (ref 10–15)
GFR SERPL CREATININE-BSD FRML MDRD: >90 ML/MIN/{1.73_M2}
GLUCOSE SERPL-MCNC: 86 MG/DL (ref 70–99)
GLUCOSE UR STRIP-MCNC: NEGATIVE MG/DL
HCG SERPL QL: NEGATIVE
HCT VFR BLD AUTO: 41.7 % (ref 35–47)
HGB BLD-MCNC: 13.9 G/DL (ref 11.7–15.7)
HGB UR QL STRIP: NEGATIVE
IMM GRANULOCYTES # BLD: 0 10E9/L (ref 0–0.4)
IMM GRANULOCYTES NFR BLD: 0.5 %
KETONES UR STRIP-MCNC: NEGATIVE MG/DL
LEUKOCYTE ESTERASE UR QL STRIP: ABNORMAL
LIPASE SERPL-CCNC: 87 U/L (ref 73–393)
LYMPHOCYTES # BLD AUTO: 2.3 10E9/L (ref 0.8–5.3)
LYMPHOCYTES NFR BLD AUTO: 27.3 %
MCH RBC QN AUTO: 32.7 PG (ref 26.5–33)
MCHC RBC AUTO-ENTMCNC: 33.3 G/DL (ref 31.5–36.5)
MCV RBC AUTO: 98 FL (ref 78–100)
MONOCYTES # BLD AUTO: 0.8 10E9/L (ref 0–1.3)
MONOCYTES NFR BLD AUTO: 9.7 %
MUCOUS THREADS #/AREA URNS LPF: PRESENT /LPF
NEUTROPHILS # BLD AUTO: 5.2 10E9/L (ref 1.6–8.3)
NEUTROPHILS NFR BLD AUTO: 61.5 %
NITRATE UR QL: NEGATIVE
NRBC # BLD AUTO: 0 10*3/UL
NRBC BLD AUTO-RTO: 0 /100
PH UR STRIP: 6 PH (ref 5–7)
PLATELET # BLD AUTO: 309 10E9/L (ref 150–450)
POTASSIUM SERPL-SCNC: 3.5 MMOL/L (ref 3.4–5.3)
PROT SERPL-MCNC: 7.7 G/DL (ref 6.8–8.8)
RBC # BLD AUTO: 4.25 10E12/L (ref 3.8–5.2)
RBC #/AREA URNS AUTO: 2 /HPF (ref 0–2)
SODIUM SERPL-SCNC: 139 MMOL/L (ref 133–144)
SOURCE: ABNORMAL
SP GR UR STRIP: 1.02 (ref 1–1.03)
SQUAMOUS #/AREA URNS AUTO: 8 /HPF (ref 0–1)
TRANS CELLS #/AREA URNS HPF: <1 /HPF (ref 0–1)
UROBILINOGEN UR STRIP-MCNC: NORMAL MG/DL (ref 0–2)
WBC # BLD AUTO: 8.4 10E9/L (ref 4–11)
WBC #/AREA URNS AUTO: 7 /HPF (ref 0–5)

## 2020-04-11 PROCEDURE — 87088 URINE BACTERIA CULTURE: CPT | Performed by: EMERGENCY MEDICINE

## 2020-04-11 PROCEDURE — 85025 COMPLETE CBC W/AUTO DIFF WBC: CPT | Performed by: EMERGENCY MEDICINE

## 2020-04-11 PROCEDURE — 80053 COMPREHEN METABOLIC PANEL: CPT | Performed by: EMERGENCY MEDICINE

## 2020-04-11 PROCEDURE — 81003 URINALYSIS AUTO W/O SCOPE: CPT | Performed by: EMERGENCY MEDICINE

## 2020-04-11 PROCEDURE — 25000132 ZZH RX MED GY IP 250 OP 250 PS 637: Mod: GY | Performed by: EMERGENCY MEDICINE

## 2020-04-11 PROCEDURE — 87086 URINE CULTURE/COLONY COUNT: CPT | Performed by: EMERGENCY MEDICINE

## 2020-04-11 PROCEDURE — 99284 EMERGENCY DEPT VISIT MOD MDM: CPT | Mod: 25 | Performed by: EMERGENCY MEDICINE

## 2020-04-11 PROCEDURE — 25000128 H RX IP 250 OP 636: Performed by: EMERGENCY MEDICINE

## 2020-04-11 PROCEDURE — 83690 ASSAY OF LIPASE: CPT | Performed by: EMERGENCY MEDICINE

## 2020-04-11 PROCEDURE — 99285 EMERGENCY DEPT VISIT HI MDM: CPT | Mod: Z6 | Performed by: EMERGENCY MEDICINE

## 2020-04-11 PROCEDURE — 96374 THER/PROPH/DIAG INJ IV PUSH: CPT | Performed by: EMERGENCY MEDICINE

## 2020-04-11 PROCEDURE — 84703 CHORIONIC GONADOTROPIN ASSAY: CPT | Performed by: EMERGENCY MEDICINE

## 2020-04-11 RX ORDER — ONDANSETRON 8 MG/1
8 TABLET, ORALLY DISINTEGRATING ORAL ONCE
Status: COMPLETED | OUTPATIENT
Start: 2020-04-11 | End: 2020-04-11

## 2020-04-11 RX ORDER — DIPHENHYDRAMINE HCL 50 MG
50 CAPSULE ORAL ONCE
Status: COMPLETED | OUTPATIENT
Start: 2020-04-11 | End: 2020-04-11

## 2020-04-11 RX ADMIN — DIPHENHYDRAMINE HYDROCHLORIDE 50 MG: 50 CAPSULE ORAL at 18:24

## 2020-04-11 RX ADMIN — PROCHLORPERAZINE EDISYLATE 10 MG: 5 INJECTION INTRAMUSCULAR; INTRAVENOUS at 19:11

## 2020-04-11 RX ADMIN — ONDANSETRON 8 MG: 8 TABLET, ORALLY DISINTEGRATING ORAL at 18:00

## 2020-04-11 ASSESSMENT — MIFFLIN-ST. JEOR: SCORE: 1532.36
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
## 2020-04-11 NOTE — DISCHARGE INSTRUCTIONS
TODAY'S VISIT:  You were seen today for rectal pain   -   - If you had any labs or imaging/radiology tests performed today, you should also discuss these tests with your usual provider.     FOLLOW-UP:  Please make an appointment to follow up with:  - Your Primary Care Provider. If you do not have a PCP, please call the Primary Care Center (phone: [phone] for an appointment  - GI Clinic (phone: [phone]) and [place] (phone: [phone])    - Have your provider review the results from today's visit with you again to make sure no further follow-up or additional testing is needed based on those results.     RETURN TO THE EMERGENCY DEPARTMENT  Return to the Emergency Department at any time for any new or worsening symptoms or any concerns.

## 2020-04-11 NOTE — ED PROVIDER NOTES
[facility] ([facility])  4/11/20    History     Chief Complaint   Patient presents with     Wound Check     anal abscess     Fever     Nausea     Diarrhea     HPI  Katy Islas is a 30 year old female with a past medical history pertinent for congenital hearing loss (need ), Crohn's disease, renal disease, GERD, C.difficile, HTN, bipolar disorder, likely borderline personality disorder, fibromyalgia and drug seeking behavior. She presents today for chief complaint of concern for worsening anal abscess.  She complains of worsening pain and believes that she has had white-colored drainage from the area as well as bleeding (although, on a prior telephone encounter, it was noted that the patient was unsure if bleeding was coming from the anal area or was secondary to her menstrual period).  Pt reports that she called her PCP and was told to come to the ED for evaluation. She has had associated fevers to 100.8 F measured orally at home, nausea, and diarrhea.  She notes a sharp left lower quadrant abdominal pain.  She was not recently noted to have cough on a prior ER visit, she notes that she has this occasionally due to her asthma/allergies, it is unchanged from her baseline at present.  No reports of SOB, or CP. Pt is afebrile on arrival. Mild tachycardia, VS otherwise wnl. Mild tachycardia to the 1102/120s is typical for the patient per chart review.  The patient has tried taking Tylenol and Zofran for symptom relief at home without alleviating her pain.  The patient is requesting Zofran, Benadryl, and Dilaudid for pain control at this time.    Per chart review, she has had multiple visits to Emergency Departments in the past month for similar complaints. She was last seen on 4/04/2020 at [facility] ED for viral URI and abdominal pain. She was placed on Zofran and Benadryl by her request, and general labs, chest and abdomen x-ray were performed. Labs were all  within normal limits. Chest and abdomen x-ray and impressions are below the HPI. She was discharged from the ED in stable condition with no acute distress.    Per chart review, in a phone call on 4/3/2020 the patient was offered an appointment with GI on 4/6/2020 but the patient disconnected prior to scheduling the appointment.      A list of recent visits is included below (copied from another ER note with my own additions for more recent visits):  3/3/2020: [place] [facility] - perianal abscess with I&D at that time  3/4/2020 - [facility] - Dr. Alamo, abscess looked good, no additional plan of treatment  3/6/2020 - [facility] - Dr. Alamo - looks good,   3/9/2020: [place] ED - fever - diagnosis adverse effect of antibiotic and diarrhea  3/10/2020: [facility] - perianal fissure -   3/15/2020: [facility] ED: perirectal abscess  3/19/2020: [facility]: UTI symptoms  3/20/2020: [facility] ED: abd pain; normal CT abd/pelvis; normal CRP and ESR, WBC normal  3/22/2020: [facility] ED: healing rectal abscess area / fissure?  3/23/2020: E-Visit: started on Klonopin and bupropion for depression   3/25/2020: [facility] - perianal fissure - given IV pain medications, had normal labs, and was sent home.  3/26 [place] ER visit  3/29 [facility] ER visit. Labs stable. No abscess seen on exam. Pt requested Rx for opiates on discharge, was told ER cannot prescribe opiates for her chronic pain issue  4/1 [facility] ER visit, concern for drug seeking behavior and that pt needs to f/u with appropriate specialist, no abscess palpated during that visit. Afebrile  4/4 [facility] ER visit, noted to have cough productive of white sputum at that time, CXR read copied below. Again, she requested narcotic pain medications as well as zofran    Chest X-Ray Impression 4/4/2020:  Slightly increased interstitial markings in  "bilateral  lungs as compared to the prior studies could represent minimal  vascular congestion, atelectasis, vascular crowding, or atypical  interstitial infiltrates. Further evaluation with noncontrast CT chest  may be helpful as clinically indicated.    Abdomen X-Ray Impression 4/4/2020:  1. Nonspecific bowel gas pattern without definite evidence for bowel  obstruction. A subtle bowel obstruction is possible given the paucity  of bowel gas.  2. Postop changes lower lumbar spine and left SI joint as described.  3. No definite renal or ureteral calculus.    CT Abdomen 3/20/20:  IMPRESSION:   \"1.  No inflammatory change, bowel obstruction or abscess. No visible perirectal abscess.  2.  Tiny nonobstructing right renal calculi.\"    I have reviewed the Medications, Allergies, Past Medical and Surgical History, and Social History in the [employer] system.  PAST MEDICAL HISTORY:   Past Medical History:   Diagnosis Date     Abdominal pain 10/31- 11/4/2005    [facility] admit for Crohn's     Allergic rhinitis, cause unspecified     Allergic rhinitis     Arthritis      C. difficile diarrhea     Past, no current diarrhea.     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, without mention of intractable epilepsy     pseudoseizures diagnosed after extensive neurologic eval     Migraine 07/21/12    D/C 07/22/12-[facility]     Migraine, unspecified, without mention of intractable migraine without mention of status migrainosus     Migraine     Mild intermittent asthma     mild " intermittent     Mycoplasma infection in conditions classified elsewhere and of unspecified site      Other chronic pain     Back pain for 6 years     Renal disease     Kidney stones     Unspecified hearing loss     congenital hearing loss       PAST SURGICAL HISTORY:   Past Surgical History:   Procedure Laterality Date     AS REMOVAL OF COCCYX  10/18/2017     C FUSION OF SACROILIAC JOINT  10/26/2018     COLONOSCOPY  7/1/2009    [facility].     COLONOSCOPY N/A 7/17/2019    Procedure: Colonoscopy, With Polypectomy And Biopsy;  Surgeon: Edwin Conde MD;  Location: [facility] OR     COLONOSCOPY WITH CO2 INSUFFLATION N/A 7/17/2019    Procedure: COLONOSCOPY, WITH CO2 INSUFFLATION;  Surgeon: Edwin Conde MD;  Location: [facility] OR     COMBINED ESOPHAGOSCOPY, GASTROSCOPY, DUODENOSCOPY (EGD) WITH CO2 INSUFFLATION N/A 4/12/2019    Procedure: COMBINED ESOPHAGOSCOPY, GASTROSCOPY, DUODENOSCOPY (EGD) WITH CO2 INSUFFLATION;  Surgeon: Edwin Conde MD;  Location: [facility] OR     ESOPHAGOSCOPY, GASTROSCOPY, DUODENOSCOPY (EGD), COMBINED N/A 4/12/2019    Procedure: Combined Esophagoscopy, Gastroscopy, Duodenoscopy (Egd), Biopsy Single Or Multiple;  Surgeon: Edwin Conde MD;  Location: [facility] OR     FUSION SPINE POSTERIOR MINIMALLY INVASIVE ONE LEVEL N/A 2/23/2017    Procedure: FUSION SPINE POSTERIOR MINIMALLY INVASIVE ONE LEVEL;  L4-5 Oblique Lateral Lumbar Interbody Fusion   Epidural steroid injection.   Transpedicular Bone marrow aspiration;  Surgeon: Jeniffer Eugene MD;  Location: [facility] OR     HC COLONOSCOPY THRU STOMA WITH BIOPSY  10/29/2003    Impression is that of normal appearing colonoscopy, without evidence of rectal bleeding.     HC COLONOSCOPY THRU STOMA, DIAGNOSTIC  10/00    normal     HC COLONOSCOPY THRU STOMA, DIAGNOSTIC  Oct 2009    Dr López- normal     HC EEG AWAKE AND SLEEP      abnormal     HC MRI BRAIN W/O CONTRAST  12/00    normal     HC REMOVAL GALLBLADDER   8/5/2009    [facility] [place]     HC UGI ENDOSCOPY DIAG W BIOPSY  11/11/09    Normal esophagus     HC UGI ENDOSCOPY, SIMPLE EXAM  7/00, 10/00    mild chronic esophagitis and duodenitis, neg H pylori     HC UGI ENDOSCOPY, SIMPLE EXAM  01/20/2005    Esophagogastroduodenoscopy, colonoscopy with biopsies.  [facility]     HC UGI ENDOSCOPY, SIMPLE EXAM  7/1/2009    [facility] [place]     HC UGI ENDOSCOPY, SIMPLE EXAM  11/11/2009    attempted upper GI, pt. could not tolerate procedure:[facility]     ORTHOPEDIC SURGERY  October 19,2011    diskectomy L4-L5       Past medical history, past surgical history, medications, and allergies were reviewed with the patient. Additional pertinent items: None    FAMILY HISTORY:   Family History   Problem Relation Age of Onset     Gastrointestinal Disease Brother         severe Crohn's     Neurologic Disorder Brother         Seizures post head injury     Depression Brother      Substance Abuse Brother      Genitourinary Problems Father         kidney stones     Diabetes Father      Heart Disease Father         Open heart surgery     Breast Cancer Maternal Grandmother      Parkinsonism Maternal Grandmother      Cerebrovascular Disease Paternal Grandmother      Cancer Maternal Grandfather         Lung     Cardiovascular Paternal Grandfather         Heart Attack     Substance Abuse Mother         in recovery x1 year      Lung Cancer Paternal Uncle      Cancer Paternal Uncle      Lung Cancer Maternal Uncle        SOCIAL HISTORY:   Social History     Tobacco Use     Smoking status: Current Every Day Smoker     Packs/day: 0.25     Years: 5.00     Pack years: 1.25     Types: Cigarettes     Smokeless tobacco: Never Used     Tobacco comment: due to illness    Substance Use Topics     Alcohol use: Not Currently     Comment: Not since last July 2018     Social history was reviewed with the patient. Additional pertinent items:  None      Patient's Medications   New Prescriptions    No medications on file   Previous Medications    ACETAMINOPHEN (TYLENOL) 500 MG TABLET    Take 2 tablets (1,000 mg) by mouth 3 times daily as needed for mild pain    ALBUTEROL (PROAIR HFA/PROVENTIL HFA/VENTOLIN HFA) 108 (90 BASE) MCG/ACT INHALER    Inhale 2 puffs into the lungs every 6 hours as needed for shortness of breath / dyspnea or wheezing    ALBUTEROL (PROVENTIL) (2.5 MG/3ML) 0.083% NEB SOLUTION    Take 1 vial (2.5 mg) by nebulization every 6 hours as needed for shortness of breath / dyspnea or wheezing    ALUM & MAG HYDROXIDE-SIMETHICONE (MAALOX ADVANCED MAX ST) 400-400-40 MG/5ML SUSP SUSPENSION    Take 15 mLs by mouth every 4 hours as needed for indigestion (mix with lidocaine)    ARIPIPRAZOLE (ABILIFY) 15 MG TABLET    Take 1 tablet (15 mg) by mouth At Bedtime    ASPIRIN (ASA) 81 MG TABLET    Take 1 tablet (81 mg) by mouth daily    AZELASTINE (OPTIVAR) 0.05 % OPHTHALMIC SOLUTION    Place 1 drop into both eyes 2 times daily    BUPROPION (WELLBUTRIN XL) 150 MG 24 HR TABLET    Take 1 tablet (150 mg) by mouth every morning    BUSPIRONE (BUSPAR) 15 MG TABLET    Take 1 tablet (15 mg) by mouth 2 times daily    CLONAZEPAM (KLONOPIN) 1 MG TABLET    Take 1 tablet (1 mg) by mouth 2 times daily as needed for anxiety    CYCLOBENZAPRINE (FLEXERIL) 10 MG TABLET    Take 1 tablet (10 mg) by mouth 3 times daily as needed for muscle spasms or other (back pain)    DIPHENHYDRAMINE (BENADRYL) 50 MG CAPSULE    Take 1-2 capsules ( mg) by mouth every 6 hours as needed for itching, allergies or sleep    DULOXETINE (CYMBALTA) 60 MG EC CAPSULE    Take 1 capsule (60 mg) by mouth daily    EPINEPHRINE (ANY BX GENERIC EQUIV) 0.3 MG/0.3ML INJECTION 2-PACK    Inject 0.3 mLs (0.3 mg) into the muscle once as needed for anaphylaxis    FEXOFENADINE (ALLEGRA) 180 MG TABLET    Take 1 tablet (180 mg) by mouth daily    GABAPENTIN (NEURONTIN) 300 MG CAPSULE    Take 1-3 capsules (300-900  mg) by mouth 3 times daily as needed (back pain)    LIDOCAINE (XYLOCAINE) 2 % SOLUTION    Swish and swallow 15 mLs in mouth every 4 hours as needed for other (GERD) ; Max 8 doses/24 hour period. Mix with 15 mLs Maalox or Mylanta.    LOSARTAN (COZAAR) 100 MG TABLET    Take 1 tablet (100 mg) by mouth daily    MEDICAL CANNABIS (PATIENT'S OWN SUPPLY)    (The purpose of this order is to document that the patient reports taking medical cannabis.  This is not a prescription, and is not used to certify that the patient has a qualifying medical condition.)    MEDROXYPROGESTERONE (DEPO-PROVERA) 150 MG/ML IM INJECTION    Inject 1 mL (150 mg) into the muscle every 3 months    METHOCARBAMOL (ROBAXIN) 750 MG TABLET    Take 1 tablet (750 mg) by mouth 3 times daily as needed for muscle spasms    METOPROLOL SUCCINATE ER (TOPROL-XL) 200 MG 24 HR TABLET    Take 1 tablet (200 mg) by mouth daily    NONFORMULARY    Apply 30 mLs topically 4 times daily    PANTOPRAZOLE (PROTONIX) 40 MG EC TABLET    Take 1 tablet (40 mg) by mouth 2 times daily Take 30-60 minutes before a meal.    PRAZOSIN (MINIPRESS) 1 MG CAPSULE    Take 1 mg by mouth At Bedtime    PROCHLORPERAZINE (COMPAZINE) 10 MG TABLET    Take 1 tablet (10 mg) by mouth every 6 hours as needed for nausea or vomiting    RIZATRIPTAN (MAXALT-MLT) 5 MG ODT    Take 1-2 tablets (5-10 mg) by mouth at onset of headache for migraine May repeat in 2 hours. Max 6 tablets/24 hours.    SUCRALFATE (CARAFATE) 1 GM TABLET    Take 1 tablet (1 g) by mouth 4 times daily as needed (heartburn)    VALACYCLOVIR (VALTREX) 1000 MG TABLET    2 tabs at onset of mouth sores.  Repeat dose in 12 hours.   Modified Medications    No medications on file   Discontinued Medications    NEOMYCIN-POLYMYXIN-HYDROCORTISONE (CORTISPORIN) 3.5-77849-0 OTIC SUSPENSION    Place 1 drop in ear(s)          Allergies   Allergen Reactions     Iohexol Hives     Other reaction(s): Hives  IV contrast; patient states she tolerates contrast  if she gets benadryl before  IV contrast; patient states she tolerates contrast if she gets benadryl before     Ativan [Lorazepam] Hives     At the IV site     Baclofen      hives     Bees Hives, Swelling and Difficulty breathing     Caffeine      Contrast Dye      Hives,   Updated 5/10/2016 CT Contrast.     Iodine Hives     Methocarbamol Swelling     Metoclopramide      Other reaction(s): Tremors  LW Reaction: shaking/sweating     Midazolam      Other reaction(s): Agitation     Monosodium Glutamate      Morphine Other (See Comments)     Difficulty with urination     Nsaids Other (See Comments)     GI BLEED x2     Other (Do Not Use) Other (See Comments)     Xanaflex- pt becomes disoriented and loses bladder control     Reglan [Metoclopramide Hcl] Other (See Comments)     shaking     Soma [Carisoprodol] Visual Disturbance     Sleep walking     Tizanidine      Topamax Other (See Comments)     Topamax [Topiramate] Nausea     Tingling  GI/Vomit     Tramadol      Severe Headache, Seizure Risk     Tylenol W/Codeine [Acetaminophen-Codeine] Nausea and Itching     Tylenol 3     Versed Other (See Comments)     Zolmitriptan      Makes face feel like its twitching     Droperidol Anxiety     Flu Virus Vaccine Rash      Arm swelling        Review of Systems   General: Positive for fevers and chills  Skin: No rash or diaphoresis  Eyes: No eye redness or discharge  Ears/Nose/Throat: No rhinorrhea or nasal congestion  Respiratory: Positive for cough (at baseline from patient's asthma/allergies), no SOB  Cardiovascular: No chest pain or palpitations  Gastrointestinal: See HPI  Genitourinary: No urinary frequency, hematuria, or dysuria  Musculoskeletal: No arthralgias or myalgias  Neurologic: No numbness or weakness  Hematologic/Lymphatic/Immunologic: No leg swelling, no easy bruising/bleeding  Endocrine: No polyuria/polydypsia      A complete review of systems was performed with pertinent positives and negatives noted in the HPI, and  "all other systems negative.    Physical Exam   BP: (!) 154/101  Pulse: 102  Temp: 98.8  F (37.1  C)  Resp: 16  Height: 154.9 cm (5' 1\")  Weight: 88 kg (194 lb)  SpO2: 99 %      Physical Exam  General: Well nourished, well developed, NAD  HEENT: EOMI, anicteric. NCAT, MMM  Neck: no jugular venous distension, supple, nl ROM  Cardiac: Tachycardic rate.  Intact peripheral pulses  Pulm: Airway patent, nonlabored breathing  Abd: Soft, diffusely tender to palpation without rebound or guarding, worse area of tenderness in the left lower quadrant, nondistended.  No masses palpated.  Rectal: Normal tone, small area of hyperpigmentation seen to left of anus, no areas of fluctuance, induration, or mass appreciated on palpation.  No active bleeding or drainage seen.  Skin: Warm and dry to the touch.  No rash  Extremities: No LE edema, no cyanosis, w/w/p  Neuro: A&Ox3, no gross focal deficits        ED Course        Procedures                           Results for orders placed or performed during the hospital encounter of 04/11/20 (from the past 24 hour(s))   CBC with platelets differential   Result Value Ref Range    WBC 8.4 4.0 - 11.0 10e9/L    RBC Count 4.25 3.8 - 5.2 10e12/L    Hemoglobin 13.9 11.7 - 15.7 g/dL    Hematocrit 41.7 35.0 - 47.0 %    MCV 98 78 - 100 fl    MCH 32.7 26.5 - 33.0 pg    MCHC 33.3 31.5 - 36.5 g/dL    RDW 12.3 10.0 - 15.0 %    Platelet Count 309 150 - 450 10e9/L    Diff Method Automated Method     % Neutrophils 61.5 %    % Lymphocytes 27.3 %    % Monocytes 9.7 %    % Eosinophils 0.6 %    % Basophils 0.4 %    % Immature Granulocytes 0.5 %    Nucleated RBCs 0 0 /100    Absolute Neutrophil 5.2 1.6 - 8.3 10e9/L    Absolute Lymphocytes 2.3 0.8 - 5.3 10e9/L    Absolute Monocytes 0.8 0.0 - 1.3 10e9/L    Absolute Eosinophils 0.1 0.0 - 0.7 10e9/L    Absolute Basophils 0.0 0.0 - 0.2 10e9/L    Abs Immature Granulocytes 0.0 0 - 0.4 10e9/L    Absolute Nucleated RBC 0.0    Comprehensive metabolic panel   Result Value " Ref Range    Sodium 139 133 - 144 mmol/L    Potassium 3.5 3.4 - 5.3 mmol/L    Chloride 107 94 - 109 mmol/L    Carbon Dioxide 26 20 - 32 mmol/L    Anion Gap 6 3 - 14 mmol/L    Glucose 86 70 - 99 mg/dL    Urea Nitrogen 16 7 - 30 mg/dL    Creatinine 0.85 0.52 - 1.04 mg/dL    GFR Estimate >90 >60 mL/min/[1.73_m2]    GFR Estimate If Black >90 >60 mL/min/[1.73_m2]    Calcium 8.9 8.5 - 10.1 mg/dL    Bilirubin Total 0.2 0.2 - 1.3 mg/dL    Albumin 4.0 3.4 - 5.0 g/dL    Protein Total 7.7 6.8 - 8.8 g/dL    Alkaline Phosphatase 98 40 - 150 U/L    ALT 59 (H) 0 - 50 U/L    AST 29 0 - 45 U/L   Lipase   Result Value Ref Range    Lipase 87 73 - 393 U/L   UA reflex to Microscopic and Culture    Specimen: Urine Midstream; Midstream Urine   Result Value Ref Range    Color Urine Yellow     Appearance Urine Clear     Glucose Urine Negative NEG^Negative mg/dL    Bilirubin Urine Negative NEG^Negative    Ketones Urine Negative NEG^Negative mg/dL    Specific Gravity Urine 1.025 1.003 - 1.035    Blood Urine Negative NEG^Negative    pH Urine 6.0 5.0 - 7.0 pH    Protein Albumin Urine 10 (A) NEG^Negative mg/dL    Urobilinogen mg/dL Normal 0.0 - 2.0 mg/dL    Nitrite Urine Negative NEG^Negative    Leukocyte Esterase Urine Moderate (A) NEG^Negative    Source Midstream Urine     RBC Urine 2 0 - 2 /HPF    WBC Urine 7 (H) 0 - 5 /HPF    Squamous Epithelial /HPF Urine 8 (H) 0 - 1 /HPF    Transitional Epi <1 0 - 1 /HPF    Mucous Urine Present (A) NEG^Negative /LPF   HCG qualitative Blood   Result Value Ref Range    HCG Qualitative Serum Negative NEG^Negative     *Note: Due to a large number of results and/or encounters for the requested time period, some results have not been displayed. A complete set of results can be found in Results Review.     Medications   hyoscyamine (LEVSIN/SL) sublingual tablet 125 mcg (125 mcg Sublingual Not Given 4/11/20 1914)   ondansetron (ZOFRAN-ODT) ODT tab 8 mg (8 mg Oral Given 4/11/20 1800)   diphenhydrAMINE (BENADRYL)  capsule 50 mg (50 mg Oral Given 4/11/20 1824)   prochlorperazine (COMPAZINE) injection 10 mg (10 mg Intravenous Given 4/11/20 1911)             Assessments & Plan (with Medical Decision Making)   The pt is a 30 yof who presents with anal abscess. Labs ordered to further evaluate the patient. No abscess appreciated on my exam.    Laboratory work-up is unremarkable.  White blood cell count is within normal limits.  Patient's temperature is also within normal limits in the emergency department.  No fevers noted.  Heart rate is at her baseline, per chart review.    Zofran, Benadryl, and Levsin were ordered for symptomatic relief in the emergency department.  Patient specifically requests Dilaudid, however this was discussed with the patient that I feel that opiate pain medication is not warranted at this time to treat her pain and may worsen her other symptoms of nausea and vomiting.    Patient declined to take Levsin.  She did request dose of Compazine for her nausea, which has persisted after Zofran and Benadryl were given.  This was ordered for the patient.    Patient was discussed with general surgery resident, on-call for colorectal surgery, they will come to the emergency department and evaluate the patient.    Patient repeatedly requesting to eat, advised that she could not have anything to eat until after she been evaluated by the surgery team in case they want to plan for any sort of intervention.    Patient was discussed with surgery resident, who is seen the patient in the emergency department.  No significant findings on her exam either.  She notes that as soon as she remarked to the patient that she did not feel the need to prescribe opiate pain medications, the patient expressed interest in leaving the emergency department as soon as possible.  She does not believe any operative intervention is needed at this time, but she does recommend that the patient follow-up with her GI doctor as well as in  colorectal surgery clinic.  Patient likely to be discharged home without any additional medications given.  However, she will discuss with her attending physician prior to giving official recommendations.    I have reviewed the nursing notes.    I have reviewed the findings, diagnosis, plan and need for follow up with the patient.    Patient wishes to leave the emergency department prior to formal surgery recommendations being given.  She is aware that her work-up is not yet complete and will sign AMA form.  She understands that the risks of leaving AGAINST MEDICAL ADVICE at this time include, but are not limited to permanent disability or death.  She is advised to follow up with her PCP as well as with her GI doctor in the colorectal surgery clinic (referral given). To return to ER immediately with any new/worsening symptoms. Plan of care discussed with patient who expresses understanding and agrees with plan of care.  Discussed with patient that we would not prescribe opiate pain medications for her symptoms as this is a chronic issue and it is inappropriate to prescribe opiate pain medications for chronic pain in the emergency department.  Additionally, discussed with the patient that given her nausea and vomiting symptoms, opiates may worsen these symptoms.      New Prescriptions    No medications on file       Final diagnoses:   Fever and chills   Rectal pain       4/11/2020   [facility], [place], EMERGENCY DEPARTMENT     Sheryl Lim MD  04/11/20 2016

## 2020-04-11 NOTE — ED TRIAGE NOTES
"Triage Assessment & Note:    BP (!) 154/101   Pulse 102   Temp 98.8  F (37.1  C) (Oral)   Resp 16   Ht 1.549 m (5' 1\")   Wt 88 kg (194 lb)   SpO2 99%   BMI 36.66 kg/m        Patient presents with: ASL pt. Pt comes to triage with reports of fever and anal abscess that is getting worse. Pt called PCP and was total to come to ED for evaluation. No reports of fever, cough, SOB, or CP.    Home Treatments/Remedies: Home medications    Febrile / Afebrile: afebrile    Duration of C/o: 2-3 mo  Dayana Garcia RN  April 11, 2020        "

## 2020-04-12 NOTE — CONSULTS
[facility] Surgery Consultation    Katy Islas MRN# [id number]   Age: 30 year old YOB: 1990     Date of Admission:  4/11/2020    Date of Consult:   4/11/20    Reason for consult: Persistent pain after I&D of rectal abscess       Requesting service: ED                   Assessment and Plan:   Assessment:   Katy Martinez is a 29yo F with a PMH of Crohns disease, GERD, Renal disease, fibromyalgia, and drug seeking behavior who is s/p I&D of a rectal abscess on 3/3 at [place] who presents for persistent pain. Unclear whether she has had prior I&D not obvious on chart review and patient left AMA before able to question again.         Plan:   -follow up with CRS in clinic as needed  -ok to discharge from ED  -no indication for pain medication    Discussed with fellow, Dr. Tejada             Chief Complaint:   Pain after I&D of Rectal Abscess         History of Present Illness:   Katy Martinez is a 29yo F with a PMH of Crohns disease (not on any medications), GERD (on PPI), Renal disease, fibromyalgia, and drug seeking behavior who is s/p I&D of a rectal abscess on 3/3 at [place] who presents for persistent pain. She has had numerous visits to the ED (16) and has had follow-ups documenting that her site appears well healed with no residual abscess. She underwent a CT scan on 3/20 showing no inflammatory change or perirectal abscess. Of note she has been asking for narcotics at these visits and there is some concern for drug seeking behavior. Today she states she has had 2 months of fevers (temp of 100.8 today at home), nausea, vomiting with mucous and bright red blood and diarrhea with bright red blood that is progressing. She also states she has white drainage at the side of her anus that is not improving.  Her I&D of her abscess has not improved her pain. In the ED she is afebrile, normal WBC, Hb 13.9, and is tachycardic and hypertensive though her tachycardia has been present during  her prior ED admissions as well. She take metoprolol for HTN and states she has not missed a dose. Her last colonoscopy was on 7/ 2019 with no signs of IBD grossly or microscopically. A summary of her prior visits are outlined below.     Per chart review from the ED:  A list of recent visits is included below (copied from another ER note with additions for more recent visits):  3/3/2020: [facility] - perianal abscess with I&D at that time  3/4/2020 - [facility] - Dr. Alamo, abscess looked good, no additional plan of treatment  3/6/2020 - [facility] - Dr. Alamo - looks good,   3/9/2020: [facility] - fever - diagnosis adverse effect of antibiotic and diarrhea  3/10/2020: [facility] - perianal fissure -   3/15/2020: [facility] ED: perirectal abscess  3/19/2020: [facility]: UTI symptoms  3/20/2020: [facility] ED: abd pain; normal CT abd/pelvis; normal CRP and ESR, WBC normal  3/22/2020: [facility] ED: healing rectal abscess area / fissure?  3/23/2020: E-Visit: started on Klonopin and bupropion for depression   3/25/2020: [facility] - perianal fissure - given IV pain medications, had normal labs, and was sent home.  3/26 [place] ER visit  3/29 [facility] ER visit. Labs stable. No abscess seen on exam. Pt requested Rx for opiates on discharge, was told ER cannot prescribe opiates for her chronic pain issue  4/1 [facility] ER visit, concern for drug seeking behavior and that pt needs to f/u with appropriate specialist, no abscess palpated during that visit. Afebrile  4/4 [facility] ER visit, noted to have cough productive of white sputum at that time, CXR read with slightly increased interstitial markings. Again, she requested narcotic pain medications as well as zofran          Past Medical History:     Past Medical History:   Diagnosis Date     Abdominal pain 10/31- 11/4/2005    [facility] admit  for Crohn's     Allergic rhinitis, cause unspecified     Allergic rhinitis     Arthritis      C. difficile diarrhea     Past, no current diarrhea.     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, without mention of intractable epilepsy     pseudoseizures diagnosed after extensive neurologic eval     Migraine 07/21/12    D/C 07/22/12-[facility]     Migraine, unspecified, without mention of intractable migraine without mention of status migrainosus     Migraine     Mild intermittent asthma     mild intermittent     Mycoplasma infection in conditions classified elsewhere and of unspecified site      Other chronic pain     Back pain for 6 years     Renal disease     Kidney stones     Unspecified hearing loss     congenital hearing loss             Past Surgical History:     Past Surgical History:   Procedure Laterality Date     AS REMOVAL OF COCCYX  10/18/2017     C FUSION OF SACROILIAC JOINT  10/26/2018     COLONOSCOPY  7/1/2009    [facility] [place]     COLONOSCOPY N/A 7/17/2019    Procedure: Colonoscopy, With Polypectomy And Biopsy;  Surgeon: Edwin Conde MD;  Location: [facility] OR     COLONOSCOPY WITH CO2 INSUFFLATION N/A 7/17/2019    Procedure: COLONOSCOPY, WITH CO2 INSUFFLATION;  Surgeon: Edwin Conde MD;  Location: [facility] OR     COMBINED ESOPHAGOSCOPY, GASTROSCOPY, DUODENOSCOPY (EGD) WITH CO2 INSUFFLATION N/A 4/12/2019    Procedure: COMBINED ESOPHAGOSCOPY, GASTROSCOPY, DUODENOSCOPY (EGD) WITH CO2 INSUFFLATION;  Surgeon:  Edwin Conde MD;  Location: [facility] OR     ESOPHAGOSCOPY, GASTROSCOPY, DUODENOSCOPY (EGD), COMBINED N/A 4/12/2019    Procedure: Combined Esophagoscopy, Gastroscopy, Duodenoscopy (Egd), Biopsy Single Or Multiple;  Surgeon: Edwin Conde MD;  Location: [facility] OR     FUSION SPINE POSTERIOR MINIMALLY INVASIVE ONE LEVEL N/A 2/23/2017    Procedure: FUSION SPINE POSTERIOR MINIMALLY INVASIVE ONE LEVEL;  L4-5 Oblique Lateral Lumbar Interbody Fusion   Epidural steroid injection.   Transpedicular Bone marrow aspiration;  Surgeon: Jeniffer Eugene MD;  Location: [facility] OR     HC COLONOSCOPY THRU STOMA WITH BIOPSY  10/29/2003    Impression is that of normal appearing colonoscopy, without evidence of rectal bleeding.     HC COLONOSCOPY THRU STOMA, DIAGNOSTIC  10/00    normal     HC COLONOSCOPY THRU STOMA, DIAGNOSTIC  Oct 2009    Dr López- normal     HC EEG AWAKE AND SLEEP      abnormal     HC MRI BRAIN W/O CONTRAST  12/00    normal     HC REMOVAL GALLBLADDER  8/5/2009    [facility].     HC UGI ENDOSCOPY DIAG W BIOPSY  11/11/09    Normal esophagus     HC UGI ENDOSCOPY, SIMPLE EXAM  7/00, 10/00    mild chronic esophagitis and duodenitis, neg H pylori     HC UGI ENDOSCOPY, SIMPLE EXAM  01/20/2005    Esophagogastroduodenoscopy, colonoscopy with biopsies.  [facility]     HC UGI ENDOSCOPY, SIMPLE EXAM  7/1/2009    [facility].      UGI ENDOSCOPY, SIMPLE EXAM  11/11/2009    attempted upper GI, pt. could not tolerate procedure:[facility]     ORTHOPEDIC SURGERY  October 19,2011    diskectomy L4-L5             Social History:     Social History     Tobacco Use     Smoking status: Current Every Day Smoker     Packs/day: 0.25     Years: 5.00     Pack years: 1.25     Types: Cigarettes     Smokeless tobacco: Never Used     Tobacco comment: due to illness    Substance Use Topics     Alcohol use: Not Currently     Comment: Not since last July 2018              Family History:     Family History   Problem Relation Age of Onset     Gastrointestinal Disease Brother         severe Crohn's     Neurologic Disorder Brother         Seizures post head injury     Depression Brother      Substance Abuse Brother      Genitourinary Problems Father         kidney stones     Diabetes Father      Heart Disease Father         Open heart surgery     Breast Cancer Maternal Grandmother      Parkinsonism Maternal Grandmother      Cerebrovascular Disease Paternal Grandmother      Cancer Maternal Grandfather         Lung     Cardiovascular Paternal Grandfather         Heart Attack     Substance Abuse Mother         in recovery x1 year      Lung Cancer Paternal Uncle      Cancer Paternal Uncle      Lung Cancer Maternal Uncle                 Allergies:     Allergies   Allergen Reactions     Iohexol Hives     Other reaction(s): Hives  IV contrast; patient states she tolerates contrast if she gets benadryl before  IV contrast; patient states she tolerates contrast if she gets benadryl before     Ativan [Lorazepam] Hives     At the IV site     Baclofen      hives     Bees Hives, Swelling and Difficulty breathing     Caffeine      Contrast Dye      Hives,   Updated 5/10/2016 CT Contrast.     Iodine Hives     Methocarbamol Swelling     Metoclopramide      Other reaction(s): Tremors  LW Reaction: shaking/sweating     Midazolam      Other reaction(s): Agitation     Monosodium Glutamate      Morphine Other (See Comments)     Difficulty with urination     Nsaids Other (See Comments)     GI BLEED x2     Other (Do Not Use) Other (See Comments)     Xanaflex- pt becomes disoriented and loses bladder control     Reglan [Metoclopramide Hcl] Other (See Comments)     shaking     Soma [Carisoprodol] Visual Disturbance     Sleep walking     Tizanidine      Topamax Other (See Comments)     Topamax [Topiramate] Nausea     Tingling  GI/Vomit     Tramadol      Severe Headache, Seizure Risk     Tylenol W/Codeine  [Acetaminophen-Codeine] Nausea and Itching     Tylenol 3     Versed Other (See Comments)     Zolmitriptan      Makes face feel like its twitching     Droperidol Anxiety     Flu Virus Vaccine Rash      Arm swelling             Medications:     Current Facility-Administered Medications   Medication     hyoscyamine (LEVSIN/SL) sublingual tablet 125 mcg     medroxyPROGESTERone (DEPO-PROVERA) injection 150 mg     medroxyPROGESTERone (DEPO-PROVERA) injection 150 mg     medroxyPROGESTERone (DEPO-PROVERA) syringe 150 mg     Current Outpatient Medications   Medication Sig     acetaminophen (TYLENOL) 500 MG tablet Take 2 tablets (1,000 mg) by mouth 3 times daily as needed for mild pain     albuterol (PROAIR HFA/PROVENTIL HFA/VENTOLIN HFA) 108 (90 Base) MCG/ACT inhaler Inhale 2 puffs into the lungs every 6 hours as needed for shortness of breath / dyspnea or wheezing     ARIPiprazole (ABILIFY) 15 MG tablet Take 1 tablet (15 mg) by mouth At Bedtime     aspirin (ASA) 81 MG tablet Take 1 tablet (81 mg) by mouth daily     azelastine (OPTIVAR) 0.05 % ophthalmic solution Place 1 drop into both eyes 2 times daily     buPROPion (WELLBUTRIN XL) 150 MG 24 hr tablet Take 1 tablet (150 mg) by mouth every morning     busPIRone (BUSPAR) 15 MG tablet Take 1 tablet (15 mg) by mouth 2 times daily     clonazePAM (KLONOPIN) 1 MG tablet Take 1 tablet (1 mg) by mouth 2 times daily as needed for anxiety     cyclobenzaprine (FLEXERIL) 10 MG tablet Take 1 tablet (10 mg) by mouth 3 times daily as needed for muscle spasms or other (back pain)     diphenhydrAMINE (BENADRYL) 50 MG capsule Take 1-2 capsules ( mg) by mouth every 6 hours as needed for itching, allergies or sleep     DULoxetine (CYMBALTA) 60 MG EC capsule Take 1 capsule (60 mg) by mouth daily     fexofenadine (ALLEGRA) 180 MG tablet Take 1 tablet (180 mg) by mouth daily     gabapentin (NEURONTIN) 300 MG capsule Take 1-3 capsules (300-900 mg) by mouth 3 times daily as needed (back pain)      losartan (COZAAR) 100 MG tablet Take 1 tablet (100 mg) by mouth daily     medical cannabis (Patient's own supply) (The purpose of this order is to document that the patient reports taking medical cannabis.  This is not a prescription, and is not used to certify that the patient has a qualifying medical condition.)     methocarbamol (ROBAXIN) 750 MG tablet Take 1 tablet (750 mg) by mouth 3 times daily as needed for muscle spasms     metoprolol succinate ER (TOPROL-XL) 200 MG 24 hr tablet Take 1 tablet (200 mg) by mouth daily     NONFORMULARY Apply 30 mLs topically 4 times daily     pantoprazole (PROTONIX) 40 MG EC tablet Take 1 tablet (40 mg) by mouth 2 times daily Take 30-60 minutes before a meal.     prazosin (MINIPRESS) 1 MG capsule Take 1 mg by mouth At Bedtime     rizatriptan (MAXALT-MLT) 5 MG ODT Take 1-2 tablets (5-10 mg) by mouth at onset of headache for migraine May repeat in 2 hours. Max 6 tablets/24 hours.     sucralfate (CARAFATE) 1 GM tablet Take 1 tablet (1 g) by mouth 4 times daily as needed (heartburn)     albuterol (PROVENTIL) (2.5 MG/3ML) 0.083% neb solution Take 1 vial (2.5 mg) by nebulization every 6 hours as needed for shortness of breath / dyspnea or wheezing     alum & mag hydroxide-simethicone (MAALOX ADVANCED MAX ST) 400-400-40 MG/5ML SUSP suspension Take 15 mLs by mouth every 4 hours as needed for indigestion (mix with lidocaine)     EPINEPHrine (ANY BX GENERIC EQUIV) 0.3 MG/0.3ML injection 2-pack Inject 0.3 mLs (0.3 mg) into the muscle once as needed for anaphylaxis     lidocaine (XYLOCAINE) 2 % solution Swish and swallow 15 mLs in mouth every 4 hours as needed for other (GERD) ; Max 8 doses/24 hour period. Mix with 15 mLs Maalox or Mylanta.     medroxyPROGESTERone (DEPO-PROVERA) 150 MG/ML IM injection Inject 1 mL (150 mg) into the muscle every 3 months     prochlorperazine (COMPAZINE) 10 MG tablet Take 1 tablet (10 mg) by mouth every 6 hours as needed for nausea or vomiting      valACYclovir (VALTREX) 1000 mg tablet 2 tabs at onset of mouth sores.  Repeat dose in 12 hours.               Review of Systems:   Negative other than HPI          Physical Exam:   All vitals have been reviewed  Temp:  [98.8  F (37.1  C)-99  F (37.2  C)] 99  F (37.2  C)  Pulse:  [102-118] 109  Resp:  [16] 16  BP: (129-159)/() 129/79  SpO2:  [93 %-99 %] 93 %  No intake or output data in the 24 hours ending 04/11/20 1914  Physical Exam:  General - no acute distress, comfortable, speaking sign language  HEENT - normocephalic, atraumatic, EOMI  Cardio - tachycardic  Pulm - non labored respirations on room air.   Abdomen - soft, tender to lower abdomen more than upper though reaction appears to be exaggerated, no peritoneal signs  Neuro - moves all extremities without apparent deficit, non-focal  Extremities - no lower extremity edema, warm, well-perfused  Skin - no rash or bruising  : rectal exam performed with no blood, no palpable areas of fluctuance, when distracted, nontender to palpation, no erythema or induration. Small 2cm area of purple colored skin on left perirectal area that is not tender nor fluctuant and does not appear ecchymotic. Congenital?           Data:   All laboratory data reviewed    Results:  BMP  Recent Labs   Lab 04/11/20  1823      POTASSIUM 3.5   CHLORIDE 107   CO2 26   BUN 16   CR 0.85   GLC 86     CBC  Recent Labs   Lab 04/11/20  1823   WBC 8.4   HGB 13.9        LFT  Recent Labs   Lab 04/11/20  1823   AST 29   ALT 59*   ALKPHOS 98   BILITOTAL 0.2   ALBUMIN 4.0     Recent Labs   Lab 04/11/20  1823   GLC 86       Imaging:    CT 3/20/20:  IMPRESSION:   1.  No inflammatory change, bowel obstruction or abscess. No visible perirectal abscess.  2.  Tiny nonobstructing right renal calculi.         Annia Mcdonald MD  PGY2 General Surgery

## 2020-04-12 NOTE — ED NOTES
Pt called RN into room several times in the last half hour asking to be able to leave now that surgery had come down and talked with her. RN informed MD that pt wanted to leave, MD wanted to talk with surgery clinic about plan of care. ASL ipad  used at all times with pt during interactions. Pt stated she wanted to have her IV taken out right now and to leave and did not want to wait for MD to finish. RN informed MD and obtained AMA form. AMA form discussed with pt with  present, pt acknowledged through  she understood form and all risks of leaving prior to end of exam. IV removed, pt refused additional vital signs to be taken.

## 2020-04-13 ENCOUNTER — PATIENT OUTREACH (OUTPATIENT)
Dept: CARE COORDINATION | Facility: CLINIC | Age: 30
End: 2020-04-13

## 2020-04-13 ENCOUNTER — NURSE TRIAGE (OUTPATIENT)
Dept: FAMILY MEDICINE | Facility: OTHER | Age: 30
End: 2020-04-13

## 2020-04-13 ENCOUNTER — MYC REFILL (OUTPATIENT)
Dept: FAMILY MEDICINE | Facility: OTHER | Age: 30
End: 2020-04-13

## 2020-04-13 DIAGNOSIS — M54.42 CHRONIC BILATERAL LOW BACK PAIN WITH LEFT-SIDED SCIATICA: ICD-10-CM

## 2020-04-13 DIAGNOSIS — F32.0 MILD MAJOR DEPRESSION (H): ICD-10-CM

## 2020-04-13 DIAGNOSIS — G89.29 CHRONIC BILATERAL LOW BACK PAIN WITH LEFT-SIDED SCIATICA: ICD-10-CM

## 2020-04-13 DIAGNOSIS — F31.63 BIPOLAR DISORDER, CURRENT EPISODE MIXED, SEVERE, WITHOUT PSYCHOTIC FEATURES (H): ICD-10-CM

## 2020-04-13 DIAGNOSIS — F43.23 ADJUSTMENT DISORDER WITH MIXED ANXIETY AND DEPRESSED MOOD: ICD-10-CM

## 2020-04-13 DIAGNOSIS — K50.118 CROHN'S DISEASE OF COLON WITH OTHER COMPLICATION (H): Primary | ICD-10-CM

## 2020-04-13 LAB
BACTERIA SPEC CULT: ABNORMAL
BACTERIA SPEC CULT: ABNORMAL
Lab: ABNORMAL
SPECIMEN SOURCE: ABNORMAL

## 2020-04-13 RX ORDER — CLONAZEPAM 1 MG/1
1 TABLET ORAL 2 TIMES DAILY PRN
Qty: 28 TABLET | Refills: 0 | Status: CANCELLED | OUTPATIENT
Start: 2020-04-13

## 2020-04-13 RX ORDER — BUPROPION HYDROCHLORIDE 150 MG/1
150 TABLET ORAL EVERY MORNING
Qty: 30 TABLET | Refills: 0 | Status: CANCELLED | OUTPATIENT
Start: 2020-04-13

## 2020-04-13 NOTE — TELEPHONE ENCOUNTER
Spoke with patient through  services.    Has diarrhea really bad started today.  Watery and burn as it comes out. Often it felt like a period with cramping. Wanting to know what she can do to help.     Encourage her to try probiotic and bland diet.  Can try an OTC anti-diarrheal only to soften stool.  Increase fluids.   Follow up with e-visit if not returning to normal stool in a day or two.      Encouraged her to send refill request through [employer] as well.    Home Care Advised:   See Care Advice section in Epic    Iggy Bernstein, RN, BSN

## 2020-04-13 NOTE — TELEPHONE ENCOUNTER
Reason for Call:  Medication or medication refill:    Do you use a [facility]?  Name of the pharmacy and phone number for the current request:  [facility] [place]    Name of the medication requested: GABAPENTIN    Other request: Patient found ants in her Gabapentin. She just had this refilled, so she is asking if a replacement refill can be sent to the pharmacy.    Can we leave a detailed message on this number? YES    Phone number patient can be reached at: Home number on file [phone] (home)    Best Time: any    Call taken on 4/13/2020 at 1:23 PM by Genie Luo

## 2020-04-13 NOTE — TELEPHONE ENCOUNTER
There are refills on it. So she should contact her pharmacy for a replacement.     Zach Rosario PA-C  [facility]

## 2020-04-13 NOTE — PROGRESS NOTES
Clinic Care Coordination Contact  [facility]/Voicemail    Clinical Data: Care Coordinator Outreach  Outreach attempted x 1.  Left message on patient's voicemail with call back information and requested return call.  Plan: Care Coordinator sent care coordination introduction letter on 3/16/20 via mail. Care Coordinator will try to reach patient again in 1-2 business days.    RADHA Otto RN Clinic Care Coordinator   [place], [place], [place]  Phone: [phone]

## 2020-04-13 NOTE — TELEPHONE ENCOUNTER
Patient called clinic. She said she spoke to the pharmacy already and they said something about there being an issue with her insurance and the medications she was trying to have refilled. Patient was extremely confused. She is asking for a call back tomorrow morning from [facility].

## 2020-04-13 NOTE — TELEPHONE ENCOUNTER
Pending Prescriptions:                       Disp   Refills    cyclobenzaprine (FLEXERIL) 10 MG tablet   90 tab*3            Sig: Take 1 tablet (10 mg) by mouth 3 times daily as           needed for muscle spasms or other (back pain)    Last Written Prescription Date:  11/12/2019  Last Fill Quantity: 90,  # refills: 3   Last office visit: 3/10/2020 with prescribing provider:     Future Office Visit:        ondansetron (ZOFRAN ODT) 4 MG ODT tab     16 tab*0            Sig: Take 2 tablets (8 mg) by mouth every 6 hours as           needed for nausea or vomiting    Prescription approved per [facility] Refill Protocol.    Claudine Jimenez, MSN, RN

## 2020-04-13 NOTE — TELEPHONE ENCOUNTER
Informed patient of message below and she then asked for refills on her  bupropion and klonopin.  I did inform her to contact pharmacy to request refill. Patient Is wanting request sent to provider.   Ebonie Gill MA on 4/13/2020 at 3:52 PM

## 2020-04-13 NOTE — TELEPHONE ENCOUNTER
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     Bupropion was sent on 03/24/2020 for 1 month supply - due soon, no refills on this.  Clonazepam was sent on 03/31/2020 for 28 tablets - so if taking 2 per day every day would be out..

## 2020-04-13 NOTE — TELEPHONE ENCOUNTER
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  Oneyda Tapia CMA ([facility])

## 2020-04-13 NOTE — TELEPHONE ENCOUNTER
Patient called back and said she also needs her gabapentin refilled, she found ants in her medication bottle so she would like more.

## 2020-04-13 NOTE — TELEPHONE ENCOUNTER
Additional Information    Negative: Shock suspected (e.g., cold/pale/clammy skin, too weak to stand, low BP, rapid pulse)    Negative: Difficult to awaken or acting confused (e.g., disoriented, slurred speech)    Negative: Sounds like a life-threatening emergency to the triager    Negative: Vomiting also present and worse than the diarrhea    Negative: Blood in stool and without diarrhea    Negative: SEVERE abdominal pain (e.g., excruciating) and present > 1 hour    Negative: SEVERE abdominal pain and age > 60    Negative: Bloody, black, or tarry bowel movements    Negative: SEVERE diarrhea (e.g., 7 or more times / day more than normal) and age > 60 years    Negative: Constant abdominal pain lasting > 2 hours    Negative: Drinking very little and has signs of dehydration (e.g., no urine > 12 hours, very dry mouth, very lightheaded)    Negative: Patient sounds very sick or weak to the triager    Negative: SEVERE diarrhea (e.g., 7 or more times / day more than normal) and present > 24 hours (1 day)    Negative: MODERATE diarrhea (e.g., 4-6 times / day more than normal) and present > 48 hours (2 days)    Negative: MODERATE diarrhea (e.g., 4-6 times / day more than normal) and age > 70 years    Negative: Abdominal pain  (Exception: pain clears completely with each passage of diarrhea stool)    Negative: Fever > 101 F (38.3 C)    Negative: Blood in the stool    Negative: Mucus or pus in stool has been present > 2 days and diarrhea is more than mild    Negative: Weak immune system (e.g., HIV positive, cancer chemo, splenectomy, organ transplant, chronic steroids)    Negative: Travel to a foreign country in past month    Negative: Recent antibiotic therapy (i.e., within last 2 months) and diarrhea present > 3 days since antibiotic was stopped    Negative: Recent hospitalization and diarrhea present > 3 days    Negative: Tube feedings (e.g., nasogastric, g-tube, j-tube)    Negative: MILD diarrhea (e.g., 1-3 or more stools  than normal in past 24 hours) diarrhea without known cause and present > 7 days    Negative: Patient wants to be seen    Negative: Diarrhea is a chronic symptom (recurrent or ongoing AND lasting > 4 weeks)    MILD-MODERATE diarrhea (e.g., 1-6 times / day more than normal)    Negative: SEVERE diarrhea (e.g., 7 or more times / day more than normal)    Protocols used: DIARRHEA-A-OH

## 2020-04-14 ENCOUNTER — TELEPHONE (OUTPATIENT)
Dept: AUDIOLOGY | Facility: CLINIC | Age: 30
End: 2020-04-14

## 2020-04-14 ENCOUNTER — NURSE TRIAGE (OUTPATIENT)
Dept: FAMILY MEDICINE | Facility: OTHER | Age: 30
End: 2020-04-14

## 2020-04-14 ENCOUNTER — NURSE TRIAGE (OUTPATIENT)
Dept: NURSING | Facility: CLINIC | Age: 30
End: 2020-04-14

## 2020-04-14 RX ORDER — ONDANSETRON 4 MG/1
8 TABLET, ORALLY DISINTEGRATING ORAL EVERY 6 HOURS PRN
Qty: 60 TABLET | Refills: 4 | Status: ON HOLD | OUTPATIENT
Start: 2020-04-14 | End: 2020-04-19

## 2020-04-14 RX ORDER — BUPROPION HYDROCHLORIDE 150 MG/1
150 TABLET ORAL EVERY MORNING
Qty: 30 TABLET | Refills: 1 | Status: SHIPPED | OUTPATIENT
Start: 2020-04-14 | End: 2020-06-01

## 2020-04-14 RX ORDER — CYCLOBENZAPRINE HCL 10 MG
10 TABLET ORAL 3 TIMES DAILY PRN
Qty: 90 TABLET | Refills: 3 | Status: SHIPPED | OUTPATIENT
Start: 2020-04-14 | End: 2020-09-09

## 2020-04-14 RX ORDER — CLONAZEPAM 1 MG/1
1 TABLET ORAL 2 TIMES DAILY PRN
Qty: 28 TABLET | Refills: 0 | Status: SHIPPED | OUTPATIENT
Start: 2020-04-14 | End: 2020-04-28

## 2020-04-14 NOTE — TELEPHONE ENCOUNTER
"\"Refills sent. Please notify patient. See if she thinks a video visit will work for our recheck of her mood in 2 weeks.      Zach Rosario PA-C  [facility]\"     "

## 2020-04-14 NOTE — TELEPHONE ENCOUNTER
Sent message to patient's hearing aid clinic as she does not attend this clinic for hearing healthcare.    Amira Barr.  Licensed Audiologist  [place] #[id number]        Spoke with patient regarding other matters and she mentioned that she needed tubes for her molds? She tarun like message sent to her audiologist.    Please contact patient if you have further questions.    Iggy Bernstein, RN, BSN

## 2020-04-14 NOTE — TELEPHONE ENCOUNTER
Patient reports that she feels there was an abscess that might be inside the rectum area and a green leakage coming out.     Have hemorrhoids also so there is pain in the rectal area from that.     Denies rashes, fever. Pain 5/10. Feels like a hard lump in that area.     Walked through protocol and advised home care.  Patient upset and said that she felt that I was not listening to her.  Reported she knows something is going on and she has been taking a bath 3 times a day for 2 months and nothing has changed. Reports that she has  Been vomiting all food that she has eating all day.      Attempted to ask patient additional questions and was informed by relay service that patient hung up.  Relay service did not introduce self at beginning of call.     No further action at this time as patient discontinued call.     Sheryl An RN/[facility]      Additional Information    Negative: [1] Widespread rash AND [2] bright red, sunburn-like AND [3] too weak to stand    Negative: Sounds like a life-threatening emergency to the triager    Negative: Painful lump or swelling at opening to anus (rectum)    Negative: Painful lump or swelling at opening to vagina (on labia)    Negative: Painful lump or swelling on scrotum    Negative: Impetigo suspected or diagnosed    Negative: Doesn't match the SYMPTOMS of a boil    Negative: MRSA, questions about (No boil or other skin lesion)    Negative: Widespread red rash    Negative: Black (necrotic) color or blisters develop in wound    Negative: Patient sounds very sick or weak to the triager    Negative: SEVERE pain (e.g., excruciating)    Negative: Red streak from area of infection    Negative: Fever > 100.5 F (38.1 C)    Negative: Boil > 2 inches across (> 5 cm; larger than a golf ball or ping pong ball)    Negative: [1] Boil > 1/2 inch across (> 12 mm; larger than a marble) AND [2] center is soft or pus colored    Negative: [1] Boil > 1/4 inch across (> 6 mm;  larger than a pencil eraser) AND [2] on face    Negative: [1] Boil AND [2] diabetes mellitus or weak immune system (e.g., HIV positive, cancer chemotherapy, transplant patient)    Negative: 2 or more boils    Negative: [1] Spreading redness around the boil AND [2] no fever    Negative: Boil > 1/2 inch across (> 12 mm; larger than a marble)    Negative: [1] Boil AND [2] not improved > 3 days following CARE ADVICE    Negative: Boils are a recurrent problem for this patient (patient with no boil now)    Negative: Boils are a recurrent problem for this family (patient with no boil now)    Boil < 1/2 inch across (< 12 mm; smaller than a marble)    Protocols used: BOIL (SKIN ABSCESS)-A-AH

## 2020-04-14 NOTE — PROGRESS NOTES
"Clinic Care Coordination Contact  [facility]    Referral Source: ED Follow-Up  Clinical Data: Care Coordinator Outreach  Outreach attempted x 2.    CC RN and patient connected using  \"video relay\" service has she has hearing loss. After CC RN introduced self, patient hung up.     Patient was seen at [facility] 4/11/20 for wound check, fever, nausea, and diarrhea. Patient was seen at [facility] 4/13/20 for abdominal pain.     Plan:   CC RN called the Colorectal nurse line at [phone]. Left a message with them asking if their scheduling dept could possible reach out to patient to help her get an appointment with colorectal versus repeat visits to the ER. CC RN left her direct line. CC RN will await return call.       RADHA Otto RN Clinic Care Coordinator   [place], [place], [place]  Phone: [phone]   "

## 2020-04-14 NOTE — TELEPHONE ENCOUNTER
Refills sent. Please notify patient. See if she thinks a video visit will work for our recheck of her mood in 2 weeks.     Zach Rosario PA-C  [facility]

## 2020-04-14 NOTE — TELEPHONE ENCOUNTER
Spoke with patient. Was seen in ED again last night. When she got home she sat down and felt a pop and pus is coming out of her recutm.  A little green in color. A little pus now but she does feel better.      Advised her to call her colorectal surgeons office for further advice.  Try to keep clean and sit on a donut to decrease pressure.  Monitor for worsening or new symptoms.    She agreed to plan.    Iggy Bernstein RN, BSN            Reason for Disposition    [1] Recurrent episodes of unexplained rectal pain AND [2] NO rectal symptoms now    Additional Information    Negative: Foreign body in rectum    Negative: Diarrhea is main symptom    Negative: Constipation is main symptom (e.g., pain or discomfort caused by passage of hard BMs)    Negative: Blood in or on bowel movement is main symptom    Negative: Pregnant    Negative: Pinworms are suspected (rectal itching; (white thread-like worm about size of a staple, moves)    Negative: Sexual assault    Negative: Injury to rectum    Negative: Large mass protruding out of rectum    Negative: Patient sounds very sick or weak to the triager    Negative: SEVERE rectal pain (e.g., excruciating, unable to have a bowel movement)    Negative: [1] Rectal pain or redness AND [2] fever    Negative: [1] Sudden onset rectal pain AND [2] constipated (straining, rectal pressure or fullness) AND [3] NOT better after SITZ bath, suppository or enema    Negative: MODERATE-SEVERE rectal pain (i.e., interferes with school, work, or sleep)    Negative: MODERATE-SEVERE rectal itching (i.e., interferes with school, work, or sleep)    Negative: Rash of rectal area (e.g., open sore, painful tiny water blisters, unexplained bumps)    Negative: Caller is worried about a sexually transmitted disease (STD)    Negative: Rectal area looks infected (e.g., draining sore, spreading redness)    Negative: Last bowel movement (BM) > 4 days ago    Negative: [1] Home treatment > 3 days for rectal pain  AND [2] not improved    Negative: [1] Home treatment for > 3 days for rectal itching AND [2] not improved    Negative: [1] Stool is mucus or pus AND [2] persists > 24 hours    Protocols used: RECTAL SYMPTOMS-A-AH

## 2020-04-15 ENCOUNTER — TELEPHONE (OUTPATIENT)
Dept: SURGERY | Facility: CLINIC | Age: 30
End: 2020-04-15

## 2020-04-15 NOTE — TELEPHONE ENCOUNTER
----- Message from Cristina Joy RN sent at 4/14/2020  4:24 PM CDT -----  Regarding: Appt  Appointment Information / Please Schedule This Appointment For:     With: Humera Luciano NP    Referring Provider: Aura Rosario / Appt Notes: Rectal Pain    Appt Type: UMP New     Appt Date/Time: ASAP    Thank you!

## 2020-04-15 NOTE — PROGRESS NOTES
Clinic Care Coordination Contact    Cristina REDDY with colorectal surgery returned call, she stated that she contact patient on 4/3/20 and offered patient an appointment as soon as 4/6/20 however, call was disconnected. A follow up mychart message was also sent to patient from Cristina. Patient read this message but did not respond. Cristina will ask colorectal surgery scheduling to reach out to patient to offer an appointment at this time.   CC RN sent a mychart message to patient with a recap of providers advice from 3/9, and 3/16. See message for details.     Plan:   1. CC RN contacted colorectal for proactive  follow up   2. CC RN sent patient a Fluorof[employer] message with provider advice for symptoms.  3. CC RN will do no further outreach at this time.     EDYTA OttoN RN Clinic Care Coordinator   [place], [place], [place]  Phone: [phone]

## 2020-04-15 NOTE — TELEPHONE ENCOUNTER
[facility] Call Center    Phone Message    May a detailed message be left on voicemail: yes     Reason for Call: Other: Per notes scheduled appt for in clinic visit with EMILY Jason     Action Taken: Message routed to:  [facility] Rectal [facility]    Travel Screening: Not Applicable

## 2020-04-15 NOTE — TELEPHONE ENCOUNTER
Lvm x 1 for pt to get scheduled with juana in person appt advised her that she has openings as early as tomorrow 04/16

## 2020-04-16 ENCOUNTER — OFFICE VISIT (OUTPATIENT)
Dept: SURGERY | Facility: CLINIC | Age: 30
End: 2020-04-16
Payer: MEDICARE

## 2020-04-16 ENCOUNTER — PRE VISIT (OUTPATIENT)
Dept: SURGERY | Facility: CLINIC | Age: 30
End: 2020-04-16

## 2020-04-16 VITALS
TEMPERATURE: 98.8 F | HEIGHT: 61 IN | DIASTOLIC BLOOD PRESSURE: 80 MMHG | HEART RATE: 117 BPM | OXYGEN SATURATION: 96 % | WEIGHT: 192.3 LBS | SYSTOLIC BLOOD PRESSURE: 128 MMHG | BODY MASS INDEX: 36.31 KG/M2

## 2020-04-16 DIAGNOSIS — F40.240 CLAUSTROPHOBIA: ICD-10-CM

## 2020-04-16 DIAGNOSIS — K60.30 ANAL FISTULA: Primary | ICD-10-CM

## 2020-04-16 RX ORDER — DIAZEPAM 5 MG
5 TABLET ORAL ONCE
Qty: 1 TABLET | Refills: 0 | Status: ON HOLD | OUTPATIENT
Start: 2020-04-16 | End: 2020-04-19

## 2020-04-16 RX ORDER — DIAZEPAM 5 MG
5 TABLET ORAL ONCE
Status: DISCONTINUED | OUTPATIENT
Start: 2020-04-16 | End: 2020-04-19

## 2020-04-16 ASSESSMENT — PAIN SCALES - GENERAL: PAINLEVEL: MODERATE PAIN (5)

## 2020-04-16 ASSESSMENT — MIFFLIN-ST. JEOR: SCORE: 1524.65

## 2020-04-16 NOTE — PROGRESS NOTES
"Colon and Rectal Surgery Consult Clinic Note    Date: 2020     Referring provider:  Referred Self, MD  No address on file     RE: Katy Islas  : 1990  TESSA: 2020    Katy Islas is a very pleasant 30 year old female with congenital hearing loss (need ), Crohn's disease, renal disease, GERD, C.difficile, HTN, bipolar disorder, likely borderline personality disorder, fibromyalgia and drug seeking behavior who presents today for anal fissure and perianal abscess..    HPI:  I&D of perianal abscess in the ER on 3/3/20. Was diagnosed with an anal fissure on 3/10/20. Has been seen in multiple ERs in the past month for the abscess/fissure which had been resolving. CT AP 3/20/20 WNL. She was most recently seen in our ER on 2020 and evaluated by our team without any significant findings on exam but she left AMA before repeat CT could be obtained.    Interval History: She states that her perianal pain is much better but that she can still feel a \"pop\" followed by some blood and purulent drainage. No fevers or chills. A burning sensation inside her anus persists. She is having normal bowel movements. She reports being diagnosed with Crohn's disease as a teenager. She follows with Dr. Pritchett and Dr. Conde at [facility]. She is not currently on any Crohn's therapy and her last few colonoscopies have been normal. Colonoscopy 19 was normal.    Physical Examination:  /80 (BP Location: Left arm, Patient Position: Sitting, Cuff Size: Adult Regular)   Pulse 117   Temp 98.8  F (37.1  C) (Oral)   Ht 5' 1\"   Wt 192 lb 4.8 oz   SpO2 96%   BMI 36.33 kg/m    General: alert, oriented, in no acute distress, sitting comfortably  HEENT: mucous membranes moist    Perianal external examination: Exam was chaperoned by Chau Edward, EMT   Small opening in the posterior midline that can be gently probed toward the anal canal. Small bruise on the left perianal " position  Digital rectal examination: Was performed.   Sphincter tone: Good.  Palpable lesions: Yes - Location: small divot palpable in the posterior anal canal just inside the anal verge. Some fullness at this time.  Other: None.  Bimanual examination: was not performed    Anoscopy: Was performed.   Hemorrhoids: No significant internal hemorrhoids.  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 She thinks she additionally had an abscess in the left lateral position. No evidence of un drained abscess at this time but a small bruise in the left lateral position. Given her Crohn's history, would like to obtained a 3T MRI to further characterize fistula tract. She is claustrophobic and requested IV benadryl and IV valium. I asked her to please try the MRI with oral valium, which she was agreeable to. Also recommended she follow up with gastroenterology soon to discuss Crohn's management as she may need to be medically treated for perianal disease if this is a fistula tract. She also complains of some ongoing abdominal pain and intermittent vomiting. Again, CT was normal a few weeks ago so would like her to address this with her gastroenterologist. We discussed treatment of anal fistulas including possible fistulotomy and possible seton drain placement. She does note that she has some difficulty holding loose stools and has had some fecal incontinence on occasion. Due to COVID-19 restrictions, if no residual abscess on MRI, would like to try to symptomatically manage and defer examination under anesthesia until it is safter to do this. However, she has had many ER visits related to this in the past month so may need to consider doing this sooner if symptoms are not managed. Advised warm tub baths several times a day and tylenol for pain.   Patient's questions were answered to her stated  satisfaction and she is in agreement with this plan.    Medical history:  Past Medical History:   Diagnosis Date     Abdominal pain 10/31- 11/4/2005    [facility] admit for Crohn's     Allergic rhinitis, cause unspecified     Allergic rhinitis     Arthritis      C. difficile diarrhea     Past, no current diarrhea.     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, without mention of intractable epilepsy     pseudoseizures diagnosed after extensive neurologic eval     Migraine 07/21/12    D/C 07/22/12-[facility]     Migraine, unspecified, without mention of intractable migraine without mention of status migrainosus     Migraine     Mild intermittent asthma     mild intermittent     Mycoplasma infection in conditions classified elsewhere and of unspecified site      Other chronic pain     Back pain for 6 years     Renal disease     Kidney stones     Unspecified hearing loss     congenital hearing loss       Surgical history:  Past Surgical History:   Procedure Laterality Date     AS REMOVAL OF COCCYX  10/18/2017     C FUSION OF SACROILIAC JOINT  10/26/2018     COLONOSCOPY  7/1/2009    [facility].     COLONOSCOPY N/A 7/17/2019    Procedure: Colonoscopy, With Polypectomy And Biopsy;  Surgeon: Edwin Conde MD;  Location:  OR     COLONOSCOPY WITH CO2 INSUFFLATION N/A 7/17/2019    Procedure: COLONOSCOPY, WITH CO2 INSUFFLATION;  Surgeon: Edwin Conde MD;  Location: [facility] OR     COMBINED ESOPHAGOSCOPY, GASTROSCOPY,  DUODENOSCOPY (EGD) WITH CO2 INSUFFLATION N/A 4/12/2019    Procedure: COMBINED ESOPHAGOSCOPY, GASTROSCOPY, DUODENOSCOPY (EGD) WITH CO2 INSUFFLATION;  Surgeon: Edwin Conde MD;  Location: [facility] OR     ESOPHAGOSCOPY, GASTROSCOPY, DUODENOSCOPY (EGD), COMBINED N/A 4/12/2019    Procedure: Combined Esophagoscopy, Gastroscopy, Duodenoscopy (Egd), Biopsy Single Or Multiple;  Surgeon: Edwin Conde MD;  Location: [facility] OR     FUSION SPINE POSTERIOR MINIMALLY INVASIVE ONE LEVEL N/A 2/23/2017    Procedure: FUSION SPINE POSTERIOR MINIMALLY INVASIVE ONE LEVEL;  L4-5 Oblique Lateral Lumbar Interbody Fusion   Epidural steroid injection.   Transpedicular Bone marrow aspiration;  Surgeon: Jeniffer Eugene MD;  Location: [facility] OR     HC COLONOSCOPY THRU STOMA WITH BIOPSY  10/29/2003    Impression is that of normal appearing colonoscopy, without evidence of rectal bleeding.     HC COLONOSCOPY THRU STOMA, DIAGNOSTIC  10/00    normal     HC COLONOSCOPY THRU STOMA, DIAGNOSTIC  Oct 2009    Dr López- normal     HC EEG AWAKE AND SLEEP      abnormal     HC MRI BRAIN W/O CONTRAST  12/00    normal     HC REMOVAL GALLBLADDER  8/5/2009    [facility].     HC UGI ENDOSCOPY DIAG W BIOPSY  11/11/09    Normal esophagus     HC UGI ENDOSCOPY, SIMPLE EXAM  7/00, 10/00    mild chronic esophagitis and duodenitis, neg H pylori     HC UGI ENDOSCOPY, SIMPLE EXAM  01/20/2005    Esophagogastroduodenoscopy, colonoscopy with biopsies.  [facility]     HC UGI ENDOSCOPY, SIMPLE EXAM  7/1/2009    [facility].      UGI ENDOSCOPY, SIMPLE EXAM  11/11/2009    attempted upper GI, pt. could not tolerate procedure:[facility]     ORTHOPEDIC SURGERY  October 19,2011    diskectomy L4-L5       Problem list:    Patient Active Problem List    Diagnosis Date Noted     Morbid obesity (H) 03/10/2020     Priority: Medium     Abnormal uterine bleeding 02/12/2020     Priority: Medium     Added  automatically from request for surgery [id number]       Anal fissure 02/04/2020     Priority: Medium     Low hematocrit 02/04/2020     Priority: Medium     Elevated d-dimer 02/04/2020     Priority: Medium     Hypertension goal BP (blood pressure) < 130/80 08/05/2019     Priority: Medium     Bulge of cervical disc without myelopathy 04/24/2019     Priority: Medium     Cervicalgia 04/17/2019     Priority: Medium     Class 1 obesity due to excess calories without serious comorbidity with body mass index (BMI) of 32.0 to 32.9 in adult 04/03/2019     Priority: Medium     Hypophosphatemia 11/01/2018     Priority: Medium     Patellar maltracking, right 09/05/2018     Priority: Medium     Right anterior knee pain 09/05/2018     Priority: Medium     Melanocytic nevus, unspecified location 07/23/2018     Priority: Medium     Dysplastic skin lesion 07/23/2018     Priority: Medium     Iliotibial band syndrome affecting lower leg, right 12/21/2017     Priority: Medium     Chondromalacia of right patellofemoral joint 12/21/2017     Priority: Medium     Upper GI bleed - suspected 07/01/2017     Priority: Medium     Tobacco use disorder 07/01/2017     Priority: Medium     History of pseudoseizure 07/01/2017     Priority: Medium     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) 03/10/2017     Priority: Medium     Lumbar disc disease with radiculopathy 02/23/2017     Priority: Medium     S/P lumbar fusion 02/23/2017     Priority: Medium     Dr. YOVANI Millan, 2/23/17       Vitamin D deficiency 01/06/2017     Priority: Medium     Atypical mole 01/06/2017     Priority: Medium     Mild persistent asthma without complication 12/02/2016     Priority: Medium     Chronic bilateral low back pain with left-sided sciatica 12/02/2016     Priority: Medium     Bee sting allergy 09/16/2016     Priority: Medium     Gastroesophageal reflux disease without esophagitis 08/26/2016     Priority: Medium     Herpes simplex virus infection 11/12/2015      Priority: Medium     Adjustment disorder with mixed anxiety and depressed mood 10/14/2015     Priority: Medium     Marijuana abuse 02/22/2015     Priority: Medium     Bipolar disorder (H) 01/31/2013     Priority: Medium     Problem list name updated by automated process. Provider to review       Chronic pain 02/09/2012     Priority: Medium     Patient is followed by Aura Rosario PA-C for ongoing prescription of pain medication.  All refills should only be approved by this provider, or covering partner.    Medication(s): Norco    Maximum quantity per month: 70  Clinic visit frequency required: Q 2 months     Controlled substance agreement:   Discussed and signed 4/25/17    Encounter-Level CSA - 01/12/2015:                 Controlled Substance Agreement - Scan on 1/26/2015  9:14 AM : Controlled Medication Agreement-01/12/15 (below)            Pain Clinic evaluation in the past: Yes       Date/Location:   [facility], fall 2016    DIRE Total Score(s):    4/25/2017   Total Score 17       Last [facility] website verification:  done on 4/25/17 by LOVE Maki   [email or web address]           Anxiety 09/22/2011     Priority: Medium     Mild major depression (H) 08/29/2011     Priority: Medium     Mild intermittent asthma 10/12/2009     Priority: Medium     Overview:   Formatting of this note might be different from the original.  Action plan: See letter 4/10/2013   ATAQ:   Asthma Data 4/10/2013   Date completed 4/10/2013   Missed daily activities no = 0   Wake at night no = 0   Believe asthma in control yes = 0   ARIN use yes   Maximum ARIN use in 1 day 1-4 = 0   ATAQ score 0 = well controlled   Asthma ED visits in past 12 mos 0   Asthma hospitalizations in past 12 mos 0     Current Outpatient Prescriptions   Medication Sig     albuterol (PROVENTIL) 0.083 % neb solution Inhale 3 mL via a nebulizer every 4 hours if needed for Shortness Of Breath.        Crohn's colitis (H) 08/04/2009      Priority: Medium     Dysmenorrhea 05/11/2007     Priority: Medium     Benign neoplasm of skin of lower limb, including hip 03/16/2004     Priority: Medium     Migraine      Priority: Medium     Dr. Farrar - Neurology.  Now on Inderal.  Seems to be working.  Follow-up 9/08  Problem list name updated by automated process. Provider to review       Hearing loss      Priority: Medium     Congenital  Problem list name updated by automated process. Provider to review       Allergic rhinitis      Priority: Medium     Problem list name updated by automated process. Provider to review         Medications:  Current Outpatient Medications   Medication Sig Dispense Refill     acetaminophen (TYLENOL) 500 MG tablet Take 2 tablets (1,000 mg) by mouth 3 times daily as needed for mild pain 100 tablet 3     albuterol (PROAIR HFA/PROVENTIL HFA/VENTOLIN HFA) 108 (90 Base) MCG/ACT inhaler Inhale 2 puffs into the lungs every 6 hours as needed for shortness of breath / dyspnea or wheezing 3 Inhaler 3     albuterol (PROVENTIL) (2.5 MG/3ML) 0.083% neb solution Take 1 vial (2.5 mg) by nebulization every 6 hours as needed for shortness of breath / dyspnea or wheezing 50 vial 11     alum & mag hydroxide-simethicone (MAALOX ADVANCED MAX ST) 400-400-40 MG/5ML SUSP suspension Take 15 mLs by mouth every 4 hours as needed for indigestion (mix with lidocaine) 355 mL 11     ARIPiprazole (ABILIFY) 15 MG tablet Take 1 tablet (15 mg) by mouth At Bedtime 90 tablet 3     aspirin (ASA) 81 MG tablet Take 1 tablet (81 mg) by mouth daily 90 tablet 3     azelastine (OPTIVAR) 0.05 % ophthalmic solution Place 1 drop into both eyes 2 times daily 6 mL 11     buPROPion (WELLBUTRIN XL) 150 MG 24 hr tablet Take 1 tablet (150 mg) by mouth every morning 30 tablet 1     busPIRone (BUSPAR) 15 MG tablet Take 1 tablet (15 mg) by mouth 2 times daily 60 tablet 5     clonazePAM (KLONOPIN) 1 MG tablet Take 1 tablet (1 mg) by mouth 2 times daily as needed for anxiety 28 tablet 0      cyclobenzaprine (FLEXERIL) 10 MG tablet Take 1 tablet (10 mg) by mouth 3 times daily as needed for muscle spasms or other (back pain) 90 tablet 3     diphenhydrAMINE (BENADRYL) 50 MG capsule Take 1-2 capsules ( mg) by mouth every 6 hours as needed for itching, allergies or sleep 90 capsule 3     DULoxetine (CYMBALTA) 60 MG EC capsule Take 1 capsule (60 mg) by mouth daily 90 capsule 3     EPINEPHrine (ANY BX GENERIC EQUIV) 0.3 MG/0.3ML injection 2-pack Inject 0.3 mLs (0.3 mg) into the muscle once as needed for anaphylaxis 0.6 mL 3     fexofenadine (ALLEGRA) 180 MG tablet Take 1 tablet (180 mg) by mouth daily 90 tablet 3     gabapentin (NEURONTIN) 300 MG capsule Take 1-3 capsules (300-900 mg) by mouth 3 times daily as needed (back pain) 270 capsule 3     lidocaine (XYLOCAINE) 2 % solution Swish and swallow 15 mLs in mouth every 4 hours as needed for other (GERD) ; Max 8 doses/24 hour period. Mix with 15 mLs Maalox or Mylanta. 100 mL 3     losartan (COZAAR) 100 MG tablet Take 1 tablet (100 mg) by mouth daily 90 tablet 3     medical cannabis (Patient's own supply) (The purpose of this order is to document that the patient reports taking medical cannabis.  This is not a prescription, and is not used to certify that the patient has a qualifying medical condition.) 0 Information only 0     medroxyPROGESTERone (DEPO-PROVERA) 150 MG/ML IM injection Inject 1 mL (150 mg) into the muscle every 3 months 3 mL 3     methocarbamol (ROBAXIN) 750 MG tablet Take 1 tablet (750 mg) by mouth 3 times daily as needed for muscle spasms 60 tablet 3     metoprolol succinate ER (TOPROL-XL) 200 MG 24 hr tablet Take 1 tablet (200 mg) by mouth daily 90 tablet 1     NONFORMULARY Apply 30 mLs topically 4 times daily       ondansetron (ZOFRAN ODT) 4 MG ODT tab Take 2 tablets (8 mg) by mouth every 6 hours as needed for nausea or vomiting 60 tablet 4     pantoprazole (PROTONIX) 40 MG EC tablet Take 1 tablet (40 mg) by mouth 2 times daily  Take 30-60 minutes before a meal. 180 tablet 3     prazosin (MINIPRESS) 1 MG capsule Take 1 mg by mouth At Bedtime  5     prochlorperazine (COMPAZINE) 10 MG tablet Take 1 tablet (10 mg) by mouth every 6 hours as needed for nausea or vomiting 30 tablet 0     rizatriptan (MAXALT-MLT) 5 MG ODT Take 1-2 tablets (5-10 mg) by mouth at onset of headache for migraine May repeat in 2 hours. Max 6 tablets/24 hours. 18 tablet 3     sucralfate (CARAFATE) 1 GM tablet Take 1 tablet (1 g) by mouth 4 times daily as needed (heartburn) 360 tablet 3     valACYclovir (VALTREX) 1000 mg tablet 2 tabs at onset of mouth sores.  Repeat dose in 12 hours.         Allergies:  Allergies   Allergen Reactions     Iohexol Hives     Other reaction(s): Hives  IV contrast; patient states she tolerates contrast if she gets benadryl before  IV contrast; patient states she tolerates contrast if she gets benadryl before     Ativan [Lorazepam] Hives     At the IV site     Baclofen      hives     Bees Hives, Swelling and Difficulty breathing     Caffeine      Contrast Dye      Hives,   Updated 5/10/2016 CT Contrast.     Iodine Hives     Methocarbamol Swelling     Metoclopramide      Other reaction(s): Tremors  LW Reaction: shaking/sweating     Midazolam      Other reaction(s): Agitation     Monosodium Glutamate      Morphine Other (See Comments)     Difficulty with urination     Nsaids Other (See Comments)     GI BLEED x2     Other (Do Not Use) Other (See Comments)     Xanaflex- pt becomes disoriented and loses bladder control     Reglan [Metoclopramide Hcl] Other (See Comments)     shaking     Soma [Carisoprodol] Visual Disturbance     Sleep walking     Tizanidine      Topamax Other (See Comments)     Topamax [Topiramate] Nausea     Tingling  GI/Vomit     Tramadol      Severe Headache, Seizure Risk     Tylenol W/Codeine [Acetaminophen-Codeine] Nausea and Itching     Tylenol 3     Versed Other (See Comments)     Zolmitriptan      Makes face feel like its  "twitching     Droperidol Anxiety     Flu Virus Vaccine Rash      Arm swelling       Family history:  Family History   Problem Relation Age of Onset     Gastrointestinal Disease Brother         severe Crohn's     Neurologic Disorder Brother         Seizures post head injury     Depression Brother      Substance Abuse Brother      Genitourinary Problems Father         kidney stones     Diabetes Father      Heart Disease Father         Open heart surgery     Breast Cancer Maternal Grandmother      Parkinsonism Maternal Grandmother      Cerebrovascular Disease Paternal Grandmother      Cancer Maternal Grandfather         Lung     Cardiovascular Paternal Grandfather         Heart Attack     Substance Abuse Mother         in recovery x1 year      Lung Cancer Paternal Uncle      Cancer Paternal Uncle      Lung Cancer Maternal Uncle        Social history:  Social History     Tobacco Use     Smoking status: Current Every Day Smoker     Packs/day: 0.25     Years: 5.00     Pack years: 1.25     Types: Cigarettes     Smokeless tobacco: Never Used   Substance Use Topics     Alcohol use: Not Currently     Comment: Not since last July 2018    Marital status: single.      Nursing Notes:   Chau Edward EMT  4/16/2020 11:18 AM  Signed  Chief Complaint   Patient presents with     Consult     Anal fissure.       Vitals:    04/16/20 1058   BP: 128/80   BP Location: Left arm   Patient Position: Sitting   Cuff Size: Adult Regular   Pulse: 117   Temp: 98.8  F (37.1  C)   TempSrc: Oral   SpO2: 96%   Weight: 192 lb 4.8 oz   Height: 5' 1\"       Body mass index is 36.33 kg/m .      CELESTINA Murdock                         Total face to face time was 30 minutes, >50% counseling.    NATA Blair, NP-C  Colon and Rectal Surgery   [facility]    This note was created using speech recognition software and may contain unintended word substitutions.    "

## 2020-04-16 NOTE — TELEPHONE ENCOUNTER
RECORDS RECEIVED FROM: Internal   DATE RECEIVED: 4/16/20 11AM   NOTES STATUS DETAILS   OFFICE NOTE from referring provider  Internal Internal referral    OFFICE NOTE from other specialist   N/A    DISCHARGE SUMMARY from hospital  Internal ED Note 4/2020   DISCHARGE REPORT from the ER Internal    OPERATIVE REPORT  N/A    MEDICATION LIST N/A    DIAGNOSTIC PROCEDURES     COLONOSCOPY Internal 7/17/19

## 2020-04-16 NOTE — NURSING NOTE
"Chief Complaint   Patient presents with     Consult     Anal fissure.       Vitals:    04/16/20 1058   BP: 128/80   BP Location: Left arm   Patient Position: Sitting   Cuff Size: Adult Regular   Pulse: 117   Temp: 98.8  F (37.1  C)   TempSrc: Oral   SpO2: 96%   Weight: 192 lb 4.8 oz   Height: 5' 1\"       Body mass index is 36.33 kg/m .      Chau Edward, EMT                      "

## 2020-04-16 NOTE — LETTER
"2020       RE: Katy Islas  [address]  [place] [id number]     Dear Colleague,    Thank you for referring your patient, Katy Islas, to the [facility] COLON AND RECTAL SURGERY at [facility] Please see a copy of my visit note below.    Colon and Rectal Surgery Consult Clinic Note    Date: 2020     Referring provider:  Referred Self, MD  No address on file     RE: Katy Islas  : 1990  TESSA: 2020    Katy Islas is a very pleasant 30 year old female with congenital hearing loss (need ), Crohn's disease, renal disease, GERD, C.difficile, HTN, bipolar disorder, likely borderline personality disorder, fibromyalgia and drug seeking behavior who presents today for anal fissure and perianal abscess..    HPI:  I&D of perianal abscess in the ER on 3/3/20. Was diagnosed with an anal fissure on 3/10/20. Has been seen in multiple ERs in the past month for the abscess/fissure which had been resolving. CT AP 3/20/20 WNL. She was most recently seen in our ER on 2020 and evaluated by our team without any significant findings on exam but she left AMA before repeat CT could be obtained.    Interval History: She states that her perianal pain is much better but that she can still feel a \"pop\" followed by some blood and purulent drainage. No fevers or chills. A burning sensation inside her anus persists. She is having normal bowel movements. She reports being diagnosed with Crohn's disease as a teenager. She follows with Dr. Pritchett and Dr. Conde at [facility]. She is not currently on any Crohn's therapy and her last few colonoscopies have been normal. Colonoscopy 19 was normal.    Physical Examination:  /80 (BP Location: Left arm, Patient Position: Sitting, Cuff Size: Adult Regular)   Pulse 117   Temp 98.8  F (37.1  C) (Oral)   Ht 5' 1\"   Wt 192 lb 4.8 oz   SpO2 96%   BMI 36.33 kg/m  "   General: alert, oriented, in no acute distress, sitting comfortably  HEENT: mucous membranes moist    Perianal external examination: Exam was chaperoned by Chau Edward, CELESTINA   Small opening in the posterior midline that can be gently probed toward the anal canal. Small bruise on the left perianal position  Digital rectal examination: Was performed.   Sphincter tone: Good.  Palpable lesions: Yes - Location: small divot palpable in the posterior anal canal just inside the anal verge. Some fullness at this time.  Other: None.  Bimanual examination: was not performed    Anoscopy: Was performed.   Hemorrhoids: No significant internal hemorrhoids.  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 She thinks she additionally had an abscess in the left lateral position. No evidence of un drained abscess at this time but a small bruise in the left lateral position. Given her Crohn's history, would like to obtained a 3T MRI to further characterize fistula tract. She is claustrophobic and requested IV benadryl and IV valium. I asked her to please try the MRI with oral valium, which she was agreeable to. Also recommended she follow up with gastroenterology soon to discuss Crohn's management as she may need to be medically treated for perianal disease if this is a fistula tract. She also complains of some ongoing abdominal pain and intermittent vomiting. Again, CT was normal a few weeks ago so would like her to address this with her gastroenterologist. We discussed treatment of anal fistulas including possible fistulotomy and possible seton drain placement. She does note that she has some difficulty holding loose stools and has had some fecal incontinence on occasion. Due to COVID-19 restrictions, if no residual abscess on MRI, would like to try to symptomatically manage and defer examination  under anesthesia until it is safter to do this. However, she has had many ER visits related to this in the past month so may need to consider doing this sooner if symptoms are not managed. Advised warm tub baths several times a day and tylenol for pain.   Patient's questions were answered to her stated satisfaction and she is in agreement with this plan.    Medical history:  Past Medical History:   Diagnosis Date     Abdominal pain 10/31- 11/4/2005    [facility] admit for Crohn's     Allergic rhinitis, cause unspecified     Allergic rhinitis     Arthritis      C. difficile diarrhea     Past, no current diarrhea.     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, without mention of intractable epilepsy     pseudoseizures diagnosed after extensive neurologic eval     Migraine 07/21/12    D/C 07/22/12-[facility]     Migraine, unspecified, without mention of intractable migraine without mention of status migrainosus     Migraine     Mild intermittent asthma     mild intermittent     Mycoplasma infection in conditions classified elsewhere and of unspecified site      Other chronic pain     Back pain for 6 years     Renal disease     Kidney stones     Unspecified hearing loss     congenital hearing loss       Surgical history:  Past Surgical History:   Procedure Laterality Date     AS REMOVAL OF COCCYX  10/18/2017     C FUSION OF SACROILIAC JOINT  10/26/2018     COLONOSCOPY  7/1/2009    [facility].     COLONOSCOPY N/A 7/17/2019    Procedure:  Colonoscopy, With Polypectomy And Biopsy;  Surgeon: Edwin Conde MD;  Location: [facility] OR     COLONOSCOPY WITH CO2 INSUFFLATION N/A 7/17/2019    Procedure: COLONOSCOPY, WITH CO2 INSUFFLATION;  Surgeon: Edwin Conde MD;  Location: [facility] OR     COMBINED ESOPHAGOSCOPY, GASTROSCOPY, DUODENOSCOPY (EGD) WITH CO2 INSUFFLATION N/A 4/12/2019    Procedure: COMBINED ESOPHAGOSCOPY, GASTROSCOPY, DUODENOSCOPY (EGD) WITH CO2 INSUFFLATION;  Surgeon: Edwin Conde MD;  Location: [facility] OR     ESOPHAGOSCOPY, GASTROSCOPY, DUODENOSCOPY (EGD), COMBINED N/A 4/12/2019    Procedure: Combined Esophagoscopy, Gastroscopy, Duodenoscopy (Egd), Biopsy Single Or Multiple;  Surgeon: Edwin Conde MD;  Location: [facility] OR     FUSION SPINE POSTERIOR MINIMALLY INVASIVE ONE LEVEL N/A 2/23/2017    Procedure: FUSION SPINE POSTERIOR MINIMALLY INVASIVE ONE LEVEL;  L4-5 Oblique Lateral Lumbar Interbody Fusion   Epidural steroid injection.   Transpedicular Bone marrow aspiration;  Surgeon: Jeniffer Eugene MD;  Location: [facility] OR     HC COLONOSCOPY THRU STOMA WITH BIOPSY  10/29/2003    Impression is that of normal appearing colonoscopy, without evidence of rectal bleeding.     HC COLONOSCOPY THRU STOMA, DIAGNOSTIC  10/00    normal     HC COLONOSCOPY THRU STOMA, DIAGNOSTIC  Oct 2009    Dr López- normal     HC EEG AWAKE AND SLEEP      abnormal     HC MRI BRAIN W/O CONTRAST  12/00    normal     HC REMOVAL GALLBLADDER  8/5/2009    [facility].     HC UGI ENDOSCOPY DIAG W BIOPSY  11/11/09    Normal esophagus     HC UGI ENDOSCOPY, SIMPLE EXAM  7/00, 10/00    mild chronic esophagitis and duodenitis, neg H pylori     HC UGI ENDOSCOPY, SIMPLE EXAM  01/20/2005    Esophagogastroduodenoscopy, colonoscopy with biopsies.  [facility] UGI ENDOSCOPY, SIMPLE EXAM  7/1/2009    [facility].      UGI ENDOSCOPY, SIMPLE EXAM  11/11/2009    attempted upper GI, pt. could not  tolerate procedure:[facility]     ORTHOPEDIC SURGERY  October 19,2011    diskectomy L4-L5       Problem list:    Patient Active Problem List    Diagnosis Date Noted     Morbid obesity (H) 03/10/2020     Priority: Medium     Abnormal uterine bleeding 02/12/2020     Priority: Medium     Added automatically from request for surgery [id number]       Anal fissure 02/04/2020     Priority: Medium     Low hematocrit 02/04/2020     Priority: Medium     Elevated d-dimer 02/04/2020     Priority: Medium     Hypertension goal BP (blood pressure) < 130/80 08/05/2019     Priority: Medium     Bulge of cervical disc without myelopathy 04/24/2019     Priority: Medium     Cervicalgia 04/17/2019     Priority: Medium     Class 1 obesity due to excess calories without serious comorbidity with body mass index (BMI) of 32.0 to 32.9 in adult 04/03/2019     Priority: Medium     Hypophosphatemia 11/01/2018     Priority: Medium     Patellar maltracking, right 09/05/2018     Priority: Medium     Right anterior knee pain 09/05/2018     Priority: Medium     Melanocytic nevus, unspecified location 07/23/2018     Priority: Medium     Dysplastic skin lesion 07/23/2018     Priority: Medium     Iliotibial band syndrome affecting lower leg, right 12/21/2017     Priority: Medium     Chondromalacia of right patellofemoral joint 12/21/2017     Priority: Medium     Upper GI bleed - suspected 07/01/2017     Priority: Medium     Tobacco use disorder 07/01/2017     Priority: Medium     History of pseudoseizure 07/01/2017     Priority: Medium     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) 03/10/2017     Priority: Medium     Lumbar disc disease with radiculopathy 02/23/2017     Priority: Medium     S/P lumbar fusion 02/23/2017     Priority: Medium     Dr. YOVANI Millan, 2/23/17       Vitamin D deficiency 01/06/2017     Priority: Medium     Atypical mole 01/06/2017     Priority: Medium     Mild persistent asthma without complication 12/02/2016      Priority: Medium     Chronic bilateral low back pain with left-sided sciatica 12/02/2016     Priority: Medium     Bee sting allergy 09/16/2016     Priority: Medium     Gastroesophageal reflux disease without esophagitis 08/26/2016     Priority: Medium     Herpes simplex virus infection 11/12/2015     Priority: Medium     Adjustment disorder with mixed anxiety and depressed mood 10/14/2015     Priority: Medium     Marijuana abuse 02/22/2015     Priority: Medium     Bipolar disorder (H) 01/31/2013     Priority: Medium     Problem list name updated by automated process. Provider to review       Chronic pain 02/09/2012     Priority: Medium     Patient is followed by Aura Rosario PA-C for ongoing prescription of pain medication.  All refills should only be approved by this provider, or covering partner.    Medication(s): Norco    Maximum quantity per month: 70  Clinic visit frequency required: Q 2 months     Controlled substance agreement:   Discussed and signed 4/25/17    Encounter-Level CSA - 01/12/2015:                 Controlled Substance Agreement - Scan on 1/26/2015  9:14 AM : Controlled Medication Agreement-01/12/15 (below)            Pain Clinic evaluation in the past: Yes       Date/Location:   [facility], fall 2016    DIRE Total Score(s):    4/25/2017   Total Score 17       Last [facility] website verification:  done on 4/25/17 by LOVE Maki   [email or web address]           Anxiety 09/22/2011     Priority: Medium     Mild major depression (H) 08/29/2011     Priority: Medium     Mild intermittent asthma 10/12/2009     Priority: Medium     Overview:   Formatting of this note might be different from the original.  Action plan: See letter 4/10/2013   ATAQ:   Asthma Data 4/10/2013   Date completed 4/10/2013   Missed daily activities no = 0   Wake at night no = 0   Believe asthma in control yes = 0   ARIN use yes   Maximum ARIN use in 1 day 1-4 = 0   ATAQ score 0 = well controlled    Asthma ED visits in past 12 mos 0   Asthma hospitalizations in past 12 mos 0     Current Outpatient Prescriptions   Medication Sig     albuterol (PROVENTIL) 0.083 % neb solution Inhale 3 mL via a nebulizer every 4 hours if needed for Shortness Of Breath.        Crohn's colitis (H) 08/04/2009     Priority: Medium     Dysmenorrhea 05/11/2007     Priority: Medium     Benign neoplasm of skin of lower limb, including hip 03/16/2004     Priority: Medium     Migraine      Priority: Medium     Dr. Farrar - Neurology.  Now on Inderal.  Seems to be working.  Follow-up 9/08  Problem list name updated by automated process. Provider to review       Hearing loss      Priority: Medium     Congenital  Problem list name updated by automated process. Provider to review       Allergic rhinitis      Priority: Medium     Problem list name updated by automated process. Provider to review         Medications:  Current Outpatient Medications   Medication Sig Dispense Refill     acetaminophen (TYLENOL) 500 MG tablet Take 2 tablets (1,000 mg) by mouth 3 times daily as needed for mild pain 100 tablet 3     albuterol (PROAIR HFA/PROVENTIL HFA/VENTOLIN HFA) 108 (90 Base) MCG/ACT inhaler Inhale 2 puffs into the lungs every 6 hours as needed for shortness of breath / dyspnea or wheezing 3 Inhaler 3     albuterol (PROVENTIL) (2.5 MG/3ML) 0.083% neb solution Take 1 vial (2.5 mg) by nebulization every 6 hours as needed for shortness of breath / dyspnea or wheezing 50 vial 11     alum & mag hydroxide-simethicone (MAALOX ADVANCED MAX ST) 400-400-40 MG/5ML SUSP suspension Take 15 mLs by mouth every 4 hours as needed for indigestion (mix with lidocaine) 355 mL 11     ARIPiprazole (ABILIFY) 15 MG tablet Take 1 tablet (15 mg) by mouth At Bedtime 90 tablet 3     aspirin (ASA) 81 MG tablet Take 1 tablet (81 mg) by mouth daily 90 tablet 3     azelastine (OPTIVAR) 0.05 % ophthalmic solution Place 1 drop into both eyes 2 times daily 6 mL 11     buPROPion  (WELLBUTRIN XL) 150 MG 24 hr tablet Take 1 tablet (150 mg) by mouth every morning 30 tablet 1     busPIRone (BUSPAR) 15 MG tablet Take 1 tablet (15 mg) by mouth 2 times daily 60 tablet 5     clonazePAM (KLONOPIN) 1 MG tablet Take 1 tablet (1 mg) by mouth 2 times daily as needed for anxiety 28 tablet 0     cyclobenzaprine (FLEXERIL) 10 MG tablet Take 1 tablet (10 mg) by mouth 3 times daily as needed for muscle spasms or other (back pain) 90 tablet 3     diphenhydrAMINE (BENADRYL) 50 MG capsule Take 1-2 capsules ( mg) by mouth every 6 hours as needed for itching, allergies or sleep 90 capsule 3     DULoxetine (CYMBALTA) 60 MG EC capsule Take 1 capsule (60 mg) by mouth daily 90 capsule 3     EPINEPHrine (ANY BX GENERIC EQUIV) 0.3 MG/0.3ML injection 2-pack Inject 0.3 mLs (0.3 mg) into the muscle once as needed for anaphylaxis 0.6 mL 3     fexofenadine (ALLEGRA) 180 MG tablet Take 1 tablet (180 mg) by mouth daily 90 tablet 3     gabapentin (NEURONTIN) 300 MG capsule Take 1-3 capsules (300-900 mg) by mouth 3 times daily as needed (back pain) 270 capsule 3     lidocaine (XYLOCAINE) 2 % solution Swish and swallow 15 mLs in mouth every 4 hours as needed for other (GERD) ; Max 8 doses/24 hour period. Mix with 15 mLs Maalox or Mylanta. 100 mL 3     losartan (COZAAR) 100 MG tablet Take 1 tablet (100 mg) by mouth daily 90 tablet 3     medical cannabis (Patient's own supply) (The purpose of this order is to document that the patient reports taking medical cannabis.  This is not a prescription, and is not used to certify that the patient has a qualifying medical condition.) 0 Information only 0     medroxyPROGESTERone (DEPO-PROVERA) 150 MG/ML IM injection Inject 1 mL (150 mg) into the muscle every 3 months 3 mL 3     methocarbamol (ROBAXIN) 750 MG tablet Take 1 tablet (750 mg) by mouth 3 times daily as needed for muscle spasms 60 tablet 3     metoprolol succinate ER (TOPROL-XL) 200 MG 24 hr tablet Take 1 tablet (200 mg) by  mouth daily 90 tablet 1     NONFORMULARY Apply 30 mLs topically 4 times daily       ondansetron (ZOFRAN ODT) 4 MG ODT tab Take 2 tablets (8 mg) by mouth every 6 hours as needed for nausea or vomiting 60 tablet 4     pantoprazole (PROTONIX) 40 MG EC tablet Take 1 tablet (40 mg) by mouth 2 times daily Take 30-60 minutes before a meal. 180 tablet 3     prazosin (MINIPRESS) 1 MG capsule Take 1 mg by mouth At Bedtime  5     prochlorperazine (COMPAZINE) 10 MG tablet Take 1 tablet (10 mg) by mouth every 6 hours as needed for nausea or vomiting 30 tablet 0     rizatriptan (MAXALT-MLT) 5 MG ODT Take 1-2 tablets (5-10 mg) by mouth at onset of headache for migraine May repeat in 2 hours. Max 6 tablets/24 hours. 18 tablet 3     sucralfate (CARAFATE) 1 GM tablet Take 1 tablet (1 g) by mouth 4 times daily as needed (heartburn) 360 tablet 3     valACYclovir (VALTREX) 1000 mg tablet 2 tabs at onset of mouth sores.  Repeat dose in 12 hours.         Allergies:  Allergies   Allergen Reactions     Iohexol Hives     Other reaction(s): Hives  IV contrast; patient states she tolerates contrast if she gets benadryl before  IV contrast; patient states she tolerates contrast if she gets benadryl before     Ativan [Lorazepam] Hives     At the IV site     Baclofen      hives     Bees Hives, Swelling and Difficulty breathing     Caffeine      Contrast Dye      Hives,   Updated 5/10/2016 CT Contrast.     Iodine Hives     Methocarbamol Swelling     Metoclopramide      Other reaction(s): Tremors  LW Reaction: shaking/sweating     Midazolam      Other reaction(s): Agitation     Monosodium Glutamate      Morphine Other (See Comments)     Difficulty with urination     Nsaids Other (See Comments)     GI BLEED x2     Other (Do Not Use) Other (See Comments)     Xanaflex- pt becomes disoriented and loses bladder control     Reglan [Metoclopramide Hcl] Other (See Comments)     shaking     Soma [Carisoprodol] Visual Disturbance     Sleep walking      "Tizanidine      Topamax Other (See Comments)     Topamax [Topiramate] Nausea     Tingling  GI/Vomit     Tramadol      Severe Headache, Seizure Risk     Tylenol W/Codeine [Acetaminophen-Codeine] Nausea and Itching     Tylenol 3     Versed Other (See Comments)     Zolmitriptan      Makes face feel like its twitching     Droperidol Anxiety     Flu Virus Vaccine Rash      Arm swelling       Family history:  Family History   Problem Relation Age of Onset     Gastrointestinal Disease Brother         severe Crohn's     Neurologic Disorder Brother         Seizures post head injury     Depression Brother      Substance Abuse Brother      Genitourinary Problems Father         kidney stones     Diabetes Father      Heart Disease Father         Open heart surgery     Breast Cancer Maternal Grandmother      Parkinsonism Maternal Grandmother      Cerebrovascular Disease Paternal Grandmother      Cancer Maternal Grandfather         Lung     Cardiovascular Paternal Grandfather         Heart Attack     Substance Abuse Mother         in recovery x1 year      Lung Cancer Paternal Uncle      Cancer Paternal Uncle      Lung Cancer Maternal Uncle        Social history:  Social History     Tobacco Use     Smoking status: Current Every Day Smoker     Packs/day: 0.25     Years: 5.00     Pack years: 1.25     Types: Cigarettes     Smokeless tobacco: Never Used   Substance Use Topics     Alcohol use: Not Currently     Comment: Not since last July 2018    Marital status: single.      Nursing Notes:   Chau Edward EMT  4/16/2020 11:18 AM  Signed  Chief Complaint   Patient presents with     Consult     Anal fissure.       Vitals:    04/16/20 1058   BP: 128/80   BP Location: Left arm   Patient Position: Sitting   Cuff Size: Adult Regular   Pulse: 117   Temp: 98.8  F (37.1  C)   TempSrc: Oral   SpO2: 96%   Weight: 192 lb 4.8 oz   Height: 5' 1\"       Body mass index is 36.33 kg/m .      CELESTINA Murdock                         Total face " to face time was 30 minutes, >50% counseling.    NATA Blair, NP-C  Colon and Rectal Surgery   [facility]    This note was created using speech recognition software and may contain unintended word substitutions.      Again, thank you for allowing me to participate in the care of your patient.      Sincerely,    NATA Blair CNP

## 2020-04-17 ENCOUNTER — HOSPITAL ENCOUNTER (OUTPATIENT)
Facility: CLINIC | Age: 30
Setting detail: OBSERVATION
Discharge: LEFT BEFORE DISCHARGE | End: 2020-04-18
Attending: EMERGENCY MEDICINE | Admitting: FAMILY MEDICINE
Payer: MEDICARE

## 2020-04-17 ENCOUNTER — APPOINTMENT (OUTPATIENT)
Dept: MRI IMAGING | Facility: CLINIC | Age: 30
End: 2020-04-17
Attending: EMERGENCY MEDICINE
Payer: MEDICARE

## 2020-04-17 ENCOUNTER — TELEPHONE (OUTPATIENT)
Dept: FAMILY MEDICINE | Facility: OTHER | Age: 30
End: 2020-04-17

## 2020-04-17 DIAGNOSIS — R11.2 NON-INTRACTABLE VOMITING WITH NAUSEA, UNSPECIFIED VOMITING TYPE: ICD-10-CM

## 2020-04-17 DIAGNOSIS — K60.30 ANAL FISTULA: ICD-10-CM

## 2020-04-17 DIAGNOSIS — K62.5 RECTAL BLEEDING: ICD-10-CM

## 2020-04-17 DIAGNOSIS — R10.30 LOWER ABDOMINAL PAIN: ICD-10-CM

## 2020-04-17 PROCEDURE — 72195 MRI PELVIS W/O DYE: CPT

## 2020-04-17 PROCEDURE — 96361 HYDRATE IV INFUSION ADD-ON: CPT | Performed by: EMERGENCY MEDICINE

## 2020-04-17 PROCEDURE — 25000132 ZZH RX MED GY IP 250 OP 250 PS 637: Mod: GY | Performed by: EMERGENCY MEDICINE

## 2020-04-17 PROCEDURE — 99285 EMERGENCY DEPT VISIT HI MDM: CPT | Mod: 25 | Performed by: EMERGENCY MEDICINE

## 2020-04-17 PROCEDURE — 99285 EMERGENCY DEPT VISIT HI MDM: CPT | Mod: Z6 | Performed by: EMERGENCY MEDICINE

## 2020-04-17 PROCEDURE — 25800030 ZZH RX IP 258 OP 636: Performed by: EMERGENCY MEDICINE

## 2020-04-17 PROCEDURE — 96374 THER/PROPH/DIAG INJ IV PUSH: CPT | Performed by: EMERGENCY MEDICINE

## 2020-04-17 PROCEDURE — 96375 TX/PRO/DX INJ NEW DRUG ADDON: CPT | Performed by: EMERGENCY MEDICINE

## 2020-04-17 PROCEDURE — 36415 COLL VENOUS BLD VENIPUNCTURE: CPT | Mod: 59 | Performed by: EMERGENCY MEDICINE

## 2020-04-17 PROCEDURE — 25000128 H RX IP 250 OP 636: Performed by: EMERGENCY MEDICINE

## 2020-04-17 RX ORDER — DIAZEPAM 5 MG
5 TABLET ORAL ONCE
Status: COMPLETED | OUTPATIENT
Start: 2020-04-17 | End: 2020-04-17

## 2020-04-17 RX ORDER — HYDROMORPHONE HYDROCHLORIDE 1 MG/ML
0.5 INJECTION, SOLUTION INTRAMUSCULAR; INTRAVENOUS; SUBCUTANEOUS
Status: COMPLETED | OUTPATIENT
Start: 2020-04-17 | End: 2020-04-18

## 2020-04-17 RX ORDER — ONDANSETRON 2 MG/ML
4 INJECTION INTRAMUSCULAR; INTRAVENOUS EVERY 30 MIN PRN
Status: DISCONTINUED | OUTPATIENT
Start: 2020-04-17 | End: 2020-04-18

## 2020-04-17 RX ORDER — DIPHENHYDRAMINE HYDROCHLORIDE 50 MG/ML
25 INJECTION INTRAMUSCULAR; INTRAVENOUS ONCE
Status: COMPLETED | OUTPATIENT
Start: 2020-04-17 | End: 2020-04-17

## 2020-04-17 RX ORDER — SODIUM CHLORIDE 9 MG/ML
1000 INJECTION, SOLUTION INTRAVENOUS CONTINUOUS
Status: DISCONTINUED | OUTPATIENT
Start: 2020-04-17 | End: 2020-04-18

## 2020-04-17 RX ADMIN — DIAZEPAM 5 MG: 5 TABLET ORAL at 22:40

## 2020-04-17 RX ADMIN — SODIUM CHLORIDE 1000 ML: 9 INJECTION, SOLUTION INTRAVENOUS at 22:31

## 2020-04-17 RX ADMIN — HYDROMORPHONE HYDROCHLORIDE 0.5 MG: 1 INJECTION, SOLUTION INTRAMUSCULAR; INTRAVENOUS; SUBCUTANEOUS at 22:25

## 2020-04-17 RX ADMIN — DIPHENHYDRAMINE HYDROCHLORIDE 25 MG: 50 INJECTION, SOLUTION INTRAMUSCULAR; INTRAVENOUS at 22:39

## 2020-04-17 RX ADMIN — ONDANSETRON 4 MG: 2 INJECTION INTRAMUSCULAR; INTRAVENOUS at 22:26

## 2020-04-17 ASSESSMENT — MIFFLIN-ST. JEOR: SCORE: 1524.65

## 2020-04-17 NOTE — TELEPHONE ENCOUNTER
Please let patient know I can not send pain medication for this.     Zach Rosario PA-C  [facility]

## 2020-04-17 NOTE — TELEPHONE ENCOUNTER
Pt called today stating she has contacted the colon and rectal surgery team because she has a temperature of 100.9 and she is having some rectal bleeding. States she feels very ill. Reviewed her chart is she called them less then 1 hour ago they will call her back. If she is feel really sick then she may need to go to the ED. She agreed to wait for their call, if her symptoms worsen she will go to ED. She has a MR scheduled for 4/19/20    Claudine Jimenez, MSN, RN

## 2020-04-18 ENCOUNTER — NURSE TRIAGE (OUTPATIENT)
Dept: NURSING | Facility: CLINIC | Age: 30
End: 2020-04-18

## 2020-04-18 ENCOUNTER — HOSPITAL ENCOUNTER (OUTPATIENT)
Facility: CLINIC | Age: 30
Setting detail: OBSERVATION
Discharge: HOME | End: 2020-04-19
Attending: EMERGENCY MEDICINE | Admitting: FAMILY MEDICINE
Payer: MEDICARE

## 2020-04-18 VITALS
HEIGHT: 61 IN | RESPIRATION RATE: 16 BRPM | HEART RATE: 90 BPM | SYSTOLIC BLOOD PRESSURE: 134 MMHG | WEIGHT: 192.3 LBS | OXYGEN SATURATION: 96 % | DIASTOLIC BLOOD PRESSURE: 83 MMHG | BODY MASS INDEX: 36.31 KG/M2 | TEMPERATURE: 97 F

## 2020-04-18 DIAGNOSIS — G89.29 CHRONIC BILATERAL LOW BACK PAIN WITH LEFT-SIDED SCIATICA: ICD-10-CM

## 2020-04-18 DIAGNOSIS — K60.2 PERIANAL FISSURE: ICD-10-CM

## 2020-04-18 DIAGNOSIS — K62.89 RECTAL PAIN: ICD-10-CM

## 2020-04-18 DIAGNOSIS — M54.42 CHRONIC BILATERAL LOW BACK PAIN WITH LEFT-SIDED SCIATICA: ICD-10-CM

## 2020-04-18 DIAGNOSIS — R11.2 NAUSEA AND VOMITING, INTRACTABILITY OF VOMITING NOT SPECIFIED, UNSPECIFIED VOMITING TYPE: Primary | ICD-10-CM

## 2020-04-18 PROBLEM — R10.9 ABDOMINAL PAIN: Status: ACTIVE | Noted: 2020-04-18

## 2020-04-18 LAB
ALBUMIN SERPL-MCNC: 3.8 G/DL (ref 3.4–5)
ALBUMIN UR-MCNC: NEGATIVE MG/DL
ALP SERPL-CCNC: 99 U/L (ref 40–150)
ALT SERPL W P-5'-P-CCNC: 39 U/L (ref 0–50)
ANION GAP SERPL CALCULATED.3IONS-SCNC: 8 MMOL/L (ref 3–14)
APPEARANCE UR: CLEAR
AST SERPL W P-5'-P-CCNC: 50 U/L (ref 0–45)
BACTERIA #/AREA URNS HPF: ABNORMAL /HPF
BASOPHILS # BLD AUTO: 0 10E9/L (ref 0–0.2)
BASOPHILS NFR BLD AUTO: 0.3 %
BILIRUB SERPL-MCNC: 0.6 MG/DL (ref 0.2–1.3)
BILIRUB UR QL STRIP: NEGATIVE
BUN SERPL-MCNC: 16 MG/DL (ref 7–30)
CALCIUM SERPL-MCNC: 8.7 MG/DL (ref 8.5–10.1)
CHLORIDE SERPL-SCNC: 111 MMOL/L (ref 94–109)
CO2 SERPL-SCNC: 22 MMOL/L (ref 20–32)
COLOR UR AUTO: YELLOW
CREAT SERPL-MCNC: 0.87 MG/DL (ref 0.52–1.04)
CRP SERPL-MCNC: <2.9 MG/L (ref 0–8)
DIFFERENTIAL METHOD BLD: NORMAL
EOSINOPHIL # BLD AUTO: 0.1 10E9/L (ref 0–0.7)
EOSINOPHIL NFR BLD AUTO: 0.8 %
ERYTHROCYTE [DISTWIDTH] IN BLOOD BY AUTOMATED COUNT: 12.1 % (ref 10–15)
ERYTHROCYTE [SEDIMENTATION RATE] IN BLOOD BY WESTERGREN METHOD: 9 MM/H (ref 0–20)
GFR SERPL CREATININE-BSD FRML MDRD: 89 ML/MIN/{1.73_M2}
GLUCOSE SERPL-MCNC: 86 MG/DL (ref 70–99)
GLUCOSE UR STRIP-MCNC: NEGATIVE MG/DL
HCT VFR BLD AUTO: 40.3 % (ref 35–47)
HGB BLD-MCNC: 13.3 G/DL (ref 11.7–15.7)
HGB UR QL STRIP: ABNORMAL
IMM GRANULOCYTES # BLD: 0 10E9/L (ref 0–0.4)
IMM GRANULOCYTES NFR BLD: 0.1 %
KETONES UR STRIP-MCNC: NEGATIVE MG/DL
LEUKOCYTE ESTERASE UR QL STRIP: ABNORMAL
LYMPHOCYTES # BLD AUTO: 2.2 10E9/L (ref 0.8–5.3)
LYMPHOCYTES NFR BLD AUTO: 29.4 %
MCH RBC QN AUTO: 33 PG (ref 26.5–33)
MCHC RBC AUTO-ENTMCNC: 33 G/DL (ref 31.5–36.5)
MCV RBC AUTO: 100 FL (ref 78–100)
MONOCYTES # BLD AUTO: 0.8 10E9/L (ref 0–1.3)
MONOCYTES NFR BLD AUTO: 10.9 %
MUCOUS THREADS #/AREA URNS LPF: PRESENT /LPF
NEUTROPHILS # BLD AUTO: 4.3 10E9/L (ref 1.6–8.3)
NEUTROPHILS NFR BLD AUTO: 58.5 %
NITRATE UR QL: NEGATIVE
NRBC # BLD AUTO: 0 10*3/UL
NRBC BLD AUTO-RTO: 0 /100
PH UR STRIP: 5.5 PH (ref 5–7)
PLATELET # BLD AUTO: 240 10E9/L (ref 150–450)
POTASSIUM SERPL-SCNC: 3.9 MMOL/L (ref 3.4–5.3)
PROT SERPL-MCNC: 7.2 G/DL (ref 6.8–8.8)
RBC # BLD AUTO: 4.03 10E12/L (ref 3.8–5.2)
RBC #/AREA URNS AUTO: 12 /HPF (ref 0–2)
SODIUM SERPL-SCNC: 140 MMOL/L (ref 133–144)
SOURCE: ABNORMAL
SP GR UR STRIP: 1.02 (ref 1–1.03)
SQUAMOUS #/AREA URNS AUTO: 3 /HPF (ref 0–1)
UROBILINOGEN UR STRIP-MCNC: NORMAL MG/DL (ref 0–2)
WBC # BLD AUTO: 7.4 10E9/L (ref 4–11)
WBC #/AREA URNS AUTO: 8 /HPF (ref 0–5)

## 2020-04-18 PROCEDURE — 85652 RBC SED RATE AUTOMATED: CPT | Performed by: EMERGENCY MEDICINE

## 2020-04-18 PROCEDURE — 99285 EMERGENCY DEPT VISIT HI MDM: CPT | Mod: Z6 | Performed by: EMERGENCY MEDICINE

## 2020-04-18 PROCEDURE — 96376 TX/PRO/DX INJ SAME DRUG ADON: CPT | Performed by: EMERGENCY MEDICINE

## 2020-04-18 PROCEDURE — 96361 HYDRATE IV INFUSION ADD-ON: CPT | Performed by: EMERGENCY MEDICINE

## 2020-04-18 PROCEDURE — 25000132 ZZH RX MED GY IP 250 OP 250 PS 637: Mod: GY | Performed by: STUDENT IN AN ORGANIZED HEALTH CARE EDUCATION/TRAINING PROGRAM

## 2020-04-18 PROCEDURE — 80053 COMPREHEN METABOLIC PANEL: CPT | Performed by: EMERGENCY MEDICINE

## 2020-04-18 PROCEDURE — 25800030 ZZH RX IP 258 OP 636: Performed by: STUDENT IN AN ORGANIZED HEALTH CARE EDUCATION/TRAINING PROGRAM

## 2020-04-18 PROCEDURE — 81001 URINALYSIS AUTO W/SCOPE: CPT | Performed by: STUDENT IN AN ORGANIZED HEALTH CARE EDUCATION/TRAINING PROGRAM

## 2020-04-18 PROCEDURE — 86140 C-REACTIVE PROTEIN: CPT | Performed by: EMERGENCY MEDICINE

## 2020-04-18 PROCEDURE — 25000128 H RX IP 250 OP 636: Performed by: EMERGENCY MEDICINE

## 2020-04-18 PROCEDURE — G0378 HOSPITAL OBSERVATION PER HR: HCPCS

## 2020-04-18 PROCEDURE — 85025 COMPLETE CBC W/AUTO DIFF WBC: CPT | Performed by: EMERGENCY MEDICINE

## 2020-04-18 PROCEDURE — 96375 TX/PRO/DX INJ NEW DRUG ADDON: CPT

## 2020-04-18 PROCEDURE — 87086 URINE CULTURE/COLONY COUNT: CPT | Performed by: STUDENT IN AN ORGANIZED HEALTH CARE EDUCATION/TRAINING PROGRAM

## 2020-04-18 PROCEDURE — 99285 EMERGENCY DEPT VISIT HI MDM: CPT | Mod: 25 | Performed by: EMERGENCY MEDICINE

## 2020-04-18 PROCEDURE — 87186 SC STD MICRODIL/AGAR DIL: CPT | Performed by: STUDENT IN AN ORGANIZED HEALTH CARE EDUCATION/TRAINING PROGRAM

## 2020-04-18 PROCEDURE — 87040 BLOOD CULTURE FOR BACTERIA: CPT | Performed by: EMERGENCY MEDICINE

## 2020-04-18 PROCEDURE — 96375 TX/PRO/DX INJ NEW DRUG ADDON: CPT | Performed by: EMERGENCY MEDICINE

## 2020-04-18 PROCEDURE — 25000128 H RX IP 250 OP 636: Performed by: STUDENT IN AN ORGANIZED HEALTH CARE EDUCATION/TRAINING PROGRAM

## 2020-04-18 PROCEDURE — 87088 URINE BACTERIA CULTURE: CPT | Performed by: STUDENT IN AN ORGANIZED HEALTH CARE EDUCATION/TRAINING PROGRAM

## 2020-04-18 PROCEDURE — 96374 THER/PROPH/DIAG INJ IV PUSH: CPT | Performed by: EMERGENCY MEDICINE

## 2020-04-18 RX ORDER — ARIPIPRAZOLE 15 MG/1
15 TABLET ORAL AT BEDTIME
Status: DISCONTINUED | OUTPATIENT
Start: 2020-04-18 | End: 2020-04-18 | Stop reason: HOSPADM

## 2020-04-18 RX ORDER — DULOXETIN HYDROCHLORIDE 60 MG/1
60 CAPSULE, DELAYED RELEASE ORAL DAILY
Status: DISCONTINUED | OUTPATIENT
Start: 2020-04-18 | End: 2020-04-18 | Stop reason: HOSPADM

## 2020-04-18 RX ORDER — GABAPENTIN 300 MG/1
300-900 CAPSULE ORAL 3 TIMES DAILY PRN
Status: DISCONTINUED | OUTPATIENT
Start: 2020-04-18 | End: 2020-04-18 | Stop reason: HOSPADM

## 2020-04-18 RX ORDER — PRAZOSIN HYDROCHLORIDE 1 MG/1
1 CAPSULE ORAL AT BEDTIME
Status: DISCONTINUED | OUTPATIENT
Start: 2020-04-18 | End: 2020-04-18 | Stop reason: HOSPADM

## 2020-04-18 RX ORDER — NALOXONE HYDROCHLORIDE 0.4 MG/ML
.1-.4 INJECTION, SOLUTION INTRAMUSCULAR; INTRAVENOUS; SUBCUTANEOUS
Status: DISCONTINUED | OUTPATIENT
Start: 2020-04-18 | End: 2020-04-18 | Stop reason: HOSPADM

## 2020-04-18 RX ORDER — PANTOPRAZOLE SODIUM 40 MG/1
40 TABLET, DELAYED RELEASE ORAL
Status: DISCONTINUED | OUTPATIENT
Start: 2020-04-18 | End: 2020-04-18 | Stop reason: HOSPADM

## 2020-04-18 RX ORDER — OXYCODONE HYDROCHLORIDE 5 MG/1
5 TABLET ORAL ONCE
Status: DISCONTINUED | OUTPATIENT
Start: 2020-04-18 | End: 2020-04-18 | Stop reason: HOSPADM

## 2020-04-18 RX ORDER — BUSPIRONE HYDROCHLORIDE 15 MG/1
15 TABLET ORAL 2 TIMES DAILY
Status: DISCONTINUED | OUTPATIENT
Start: 2020-04-18 | End: 2020-04-18 | Stop reason: HOSPADM

## 2020-04-18 RX ORDER — BUPROPION HYDROCHLORIDE 150 MG/1
150 TABLET ORAL EVERY MORNING
Status: DISCONTINUED | OUTPATIENT
Start: 2020-04-18 | End: 2020-04-18 | Stop reason: HOSPADM

## 2020-04-18 RX ORDER — ONDANSETRON 4 MG/1
4 TABLET, ORALLY DISINTEGRATING ORAL EVERY 6 HOURS PRN
Status: DISCONTINUED | OUTPATIENT
Start: 2020-04-18 | End: 2020-04-18

## 2020-04-18 RX ORDER — ACETAMINOPHEN 325 MG/1
650 TABLET ORAL EVERY 4 HOURS PRN
Status: DISCONTINUED | OUTPATIENT
Start: 2020-04-18 | End: 2020-04-18 | Stop reason: HOSPADM

## 2020-04-18 RX ORDER — ASPIRIN 81 MG/1
81 TABLET ORAL DAILY
Status: DISCONTINUED | OUTPATIENT
Start: 2020-04-18 | End: 2020-04-18 | Stop reason: HOSPADM

## 2020-04-18 RX ORDER — METOPROLOL SUCCINATE 100 MG/1
200 TABLET, EXTENDED RELEASE ORAL DAILY
Status: DISCONTINUED | OUTPATIENT
Start: 2020-04-18 | End: 2020-04-18 | Stop reason: HOSPADM

## 2020-04-18 RX ORDER — CLONAZEPAM 1 MG/1
1 TABLET ORAL 2 TIMES DAILY PRN
Status: DISCONTINUED | OUTPATIENT
Start: 2020-04-18 | End: 2020-04-18 | Stop reason: HOSPADM

## 2020-04-18 RX ORDER — SODIUM CHLORIDE, SODIUM LACTATE, POTASSIUM CHLORIDE, CALCIUM CHLORIDE 600; 310; 30; 20 MG/100ML; MG/100ML; MG/100ML; MG/100ML
INJECTION, SOLUTION INTRAVENOUS CONTINUOUS
Status: DISCONTINUED | OUTPATIENT
Start: 2020-04-18 | End: 2020-04-18 | Stop reason: HOSPADM

## 2020-04-18 RX ORDER — LOSARTAN POTASSIUM 100 MG/1
100 TABLET ORAL DAILY
Status: DISCONTINUED | OUTPATIENT
Start: 2020-04-18 | End: 2020-04-18 | Stop reason: HOSPADM

## 2020-04-18 RX ORDER — LIDOCAINE 50 MG/G
OINTMENT TOPICAL EVERY 4 HOURS PRN
Status: DISCONTINUED | OUTPATIENT
Start: 2020-04-18 | End: 2020-04-18 | Stop reason: HOSPADM

## 2020-04-18 RX ORDER — PROCHLORPERAZINE 25 MG
25 SUPPOSITORY, RECTAL RECTAL EVERY 12 HOURS PRN
Status: DISCONTINUED | OUTPATIENT
Start: 2020-04-18 | End: 2020-04-18 | Stop reason: HOSPADM

## 2020-04-18 RX ORDER — ACETAMINOPHEN 650 MG/1
650 SUPPOSITORY RECTAL EVERY 4 HOURS PRN
Status: DISCONTINUED | OUTPATIENT
Start: 2020-04-18 | End: 2020-04-18 | Stop reason: HOSPADM

## 2020-04-18 RX ORDER — KETOROLAC TROMETHAMINE 15 MG/ML
15 INJECTION, SOLUTION INTRAMUSCULAR; INTRAVENOUS EVERY 6 HOURS PRN
Status: DISCONTINUED | OUTPATIENT
Start: 2020-04-18 | End: 2020-04-18 | Stop reason: HOSPADM

## 2020-04-18 RX ORDER — SUCRALFATE 1 G/1
1 TABLET ORAL 4 TIMES DAILY PRN
Status: DISCONTINUED | OUTPATIENT
Start: 2020-04-18 | End: 2020-04-18 | Stop reason: HOSPADM

## 2020-04-18 RX ORDER — ONDANSETRON 2 MG/ML
4 INJECTION INTRAMUSCULAR; INTRAVENOUS EVERY 6 HOURS PRN
Status: DISCONTINUED | OUTPATIENT
Start: 2020-04-18 | End: 2020-04-18 | Stop reason: HOSPADM

## 2020-04-18 RX ORDER — DIPHENHYDRAMINE HCL 25 MG
25 CAPSULE ORAL EVERY 4 HOURS PRN
Status: DISCONTINUED | OUTPATIENT
Start: 2020-04-18 | End: 2020-04-18 | Stop reason: HOSPADM

## 2020-04-18 RX ORDER — FEXOFENADINE HCL 180 MG/1
180 TABLET ORAL DAILY
Status: DISCONTINUED | OUTPATIENT
Start: 2020-04-18 | End: 2020-04-18 | Stop reason: HOSPADM

## 2020-04-18 RX ORDER — RIZATRIPTAN BENZOATE 5 MG/1
5-10 TABLET, ORALLY DISINTEGRATING ORAL
Status: DISCONTINUED | OUTPATIENT
Start: 2020-04-18 | End: 2020-04-18 | Stop reason: HOSPADM

## 2020-04-18 RX ORDER — ONDANSETRON 2 MG/ML
4 INJECTION INTRAMUSCULAR; INTRAVENOUS EVERY 6 HOURS PRN
Status: DISCONTINUED | OUTPATIENT
Start: 2020-04-18 | End: 2020-04-18

## 2020-04-18 RX ORDER — ONDANSETRON 4 MG/1
8 TABLET, ORALLY DISINTEGRATING ORAL EVERY 6 HOURS PRN
Status: DISCONTINUED | OUTPATIENT
Start: 2020-04-18 | End: 2020-04-18 | Stop reason: HOSPADM

## 2020-04-18 RX ORDER — PROCHLORPERAZINE MALEATE 5 MG
10 TABLET ORAL EVERY 6 HOURS PRN
Status: DISCONTINUED | OUTPATIENT
Start: 2020-04-18 | End: 2020-04-18 | Stop reason: HOSPADM

## 2020-04-18 RX ADMIN — HYDROMORPHONE HYDROCHLORIDE 0.5 MG: 1 INJECTION, SOLUTION INTRAMUSCULAR; INTRAVENOUS; SUBCUTANEOUS at 00:52

## 2020-04-18 RX ADMIN — ONDANSETRON 4 MG: 2 INJECTION INTRAMUSCULAR; INTRAVENOUS at 00:01

## 2020-04-18 RX ADMIN — HYDROMORPHONE HYDROCHLORIDE 0.5 MG: 1 INJECTION, SOLUTION INTRAMUSCULAR; INTRAVENOUS; SUBCUTANEOUS at 00:01

## 2020-04-18 RX ADMIN — SODIUM CHLORIDE 1000 ML: 9 INJECTION, SOLUTION INTRAVENOUS at 02:02

## 2020-04-18 RX ADMIN — PROCHLORPERAZINE EDISYLATE 10 MG: 5 INJECTION INTRAMUSCULAR; INTRAVENOUS at 02:18

## 2020-04-18 RX ADMIN — DIPHENHYDRAMINE HYDROCHLORIDE 25 MG: 25 CAPSULE ORAL at 02:15

## 2020-04-18 RX ADMIN — ACETAMINOPHEN 650 MG: 325 TABLET ORAL at 02:15

## 2020-04-18 RX ADMIN — SODIUM CHLORIDE, POTASSIUM CHLORIDE, SODIUM LACTATE AND CALCIUM CHLORIDE: 600; 310; 30; 20 INJECTION, SOLUTION INTRAVENOUS at 03:09

## 2020-04-18 ASSESSMENT — ENCOUNTER SYMPTOMS
FREQUENCY: 0
VOMITING: 1
HEADACHES: 0
CONSTIPATION: 0
WEAKNESS: 0
CHILLS: 1
BACK PAIN: 1
APPETITE CHANGE: 1
RECTAL PAIN: 1
DIZZINESS: 0
HEMATURIA: 0
FEVER: 1
ANAL BLEEDING: 1
DYSURIA: 0
LIGHT-HEADEDNESS: 0
SHORTNESS OF BREATH: 0
NAUSEA: 1
DIARRHEA: 1
ABDOMINAL PAIN: 1
COUGH: 0
BACK PAIN: 0
SORE THROAT: 0
NUMBNESS: 0
BLOOD IN STOOL: 1
RHINORRHEA: 0
DYSURIA: 1

## 2020-04-18 ASSESSMENT — MIFFLIN-ST. JEOR: SCORE: 1521.01

## 2020-04-18 NOTE — ED PROVIDER NOTES
ED Provider Note  [facility]      History     Chief Complaint   Patient presents with     Rectal Bleeding     Abdominal Pain     HPI  Katy Islas is a 30 year old female with a past medical history significant for congenital hearing loss (requires ASL interpretor), Crohn's disease, GERD, HTN, C. Difficile diarrhea, renal disease, bipolar disorder, and fibromyalgia, being followed by colorectal surgery for a recent perianal abscess (I&D'd 3/3/20) anal fissure, and concern for possible anal fistula who presents to the ED today with complaint of fever, abdominal pain, rectal burning pain, bloody stools, dysuria, and vaginal discharge. Patient states her burning rectal pain has been present throughout the course of her recent evaluations (has been seen in multiple ERs over past month for this complaint) but the lower abdominal pain an rectal bleeding is new. She states she has had two bright red bloody bowel movements today with clots mixed in with some stool. She has had burning pain with urination and severe nausea with poor PO intake and an episode of nonbloody emesis today. Also notes a new clear vaginal discharge and is worried this could be from a fistula. States she is not currently sexually active. She has been taking tylenol for pain at home without relief. Tmax was 100.8 measured this evening but she c/w subjective fevers intermittently for a few days. She is not currently taking any antibiotics. Per review of chart, patient was seen in colorectal surgery office on 4/16. Plan was to obtain nonemergent outpatient MRI of the pelvis to evaluate for a perianal fistula. Patient is not currently on any treatment for Crohn's and she has been referred to follow up with GI to discuss medical management of Crohn's.      Past Medical History  Past Medical History:   Diagnosis Date     Abdominal pain 10/31- 11/4/2005    [facility] admit for Crohn's     Allergic rhinitis, cause  unspecified     Allergic rhinitis     Arthritis      C. difficile diarrhea     Past, no current diarrhea.     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, without mention of intractable epilepsy     pseudoseizures diagnosed after extensive neurologic eval     Migraine 07/21/12    D/C 07/22/12-[facility]     Migraine, unspecified, without mention of intractable migraine without mention of status migrainosus     Migraine     Mild intermittent asthma     mild intermittent     Mycoplasma infection in conditions classified elsewhere and of unspecified site      Other chronic pain     Back pain for 6 years     Renal disease     Kidney stones     Unspecified hearing loss     congenital hearing loss     Past Surgical History:   Procedure Laterality Date     AS REMOVAL OF COCCYX  10/18/2017     C FUSION OF SACROILIAC JOINT  10/26/2018     COLONOSCOPY  7/1/2009    [facility] [place]     COLONOSCOPY N/A 7/17/2019    Procedure: Colonoscopy, With Polypectomy And Biopsy;  Surgeon: Edwin Conde MD;  Location: [facility] OR     COLONOSCOPY WITH CO2 INSUFFLATION N/A 7/17/2019    Procedure: COLONOSCOPY, WITH CO2 INSUFFLATION;  Surgeon: Edwin Conde MD;  Location: [facility]     COMBINED ESOPHAGOSCOPY, GASTROSCOPY, DUODENOSCOPY (EGD) WITH CO2 INSUFFLATION N/A 4/12/2019    Procedure: COMBINED ESOPHAGOSCOPY, GASTROSCOPY, DUODENOSCOPY (EGD) WITH CO2 INSUFFLATION;  Surgeon: Edwin Conde MD;  Location: [facility] OR     ESOPHAGOSCOPY, GASTROSCOPY,  DUODENOSCOPY (EGD), COMBINED N/A 4/12/2019    Procedure: Combined Esophagoscopy, Gastroscopy, Duodenoscopy (Egd), Biopsy Single Or Multiple;  Surgeon: Edwin Conde MD;  Location: [facility] OR     FUSION SPINE POSTERIOR MINIMALLY INVASIVE ONE LEVEL N/A 2/23/2017    Procedure: FUSION SPINE POSTERIOR MINIMALLY INVASIVE ONE LEVEL;  L4-5 Oblique Lateral Lumbar Interbody Fusion   Epidural steroid injection.   Transpedicular Bone marrow aspiration;  Surgeon: Jeniffer Eugene MD;  Location: [facility] OR     HC COLONOSCOPY THRU STOMA WITH BIOPSY  10/29/2003    Impression is that of normal appearing colonoscopy, without evidence of rectal bleeding.     HC COLONOSCOPY THRU STOMA, DIAGNOSTIC  10/00    normal     HC COLONOSCOPY THRU STOMA, DIAGNOSTIC  Oct 2009    Dr López- normal     HC EEG AWAKE AND SLEEP      abnormal     HC MRI BRAIN W/O CONTRAST  12/00    normal     HC REMOVAL GALLBLADDER  8/5/2009    [facility], [facility].     HC UGI ENDOSCOPY DIAG W BIOPSY  11/11/09    Normal esophagus     HC UGI ENDOSCOPY, SIMPLE EXAM  7/00, 10/00    mild chronic esophagitis and duodenitis, neg H pylori     HC UGI ENDOSCOPY, SIMPLE EXAM  01/20/2005    Esophagogastroduodenoscopy, colonoscopy with biopsies.  [facility] UGI ENDOSCOPY, SIMPLE EXAM  7/1/2009    [facility], [facility].      UGI ENDOSCOPY, SIMPLE EXAM  11/11/2009    attempted upper GI, pt. could not tolerate procedure:[facility]     ORTHOPEDIC SURGERY  October 19,2011    diskectomy L4-L5     No current outpatient medications on file.    Allergies   Allergen Reactions     Iohexol Hives     Other reaction(s): Hives  IV contrast; patient states she tolerates contrast if she gets benadryl before  IV contrast; patient states she tolerates contrast if she gets benadryl before     Ativan [Lorazepam] Hives     At the IV site     Baclofen      hives     Bees Hives, Swelling and Difficulty breathing     Caffeine      Contrast Dye       Hives,   Updated 5/10/2016 CT Contrast.     Iodine Hives     Methocarbamol Swelling     Metoclopramide      Other reaction(s): Tremors  LW Reaction: shaking/sweating     Midazolam      Other reaction(s): Agitation     Monosodium Glutamate      Morphine Other (See Comments)     Difficulty with urination     Nsaids Other (See Comments)     GI BLEED x2     Other (Do Not Use) Other (See Comments)     Xanaflex- pt becomes disoriented and loses bladder control     Reglan [Metoclopramide Hcl] Other (See Comments)     shaking     Soma [Carisoprodol] Visual Disturbance     Sleep walking     Tizanidine      Topamax Other (See Comments)     Topamax [Topiramate] Nausea     Tingling  GI/Vomit     Tramadol      Severe Headache, Seizure Risk     Tylenol W/Codeine [Acetaminophen-Codeine] Nausea and Itching     Tylenol 3     Versed Other (See Comments)     Zolmitriptan      Makes face feel like its twitching     Droperidol Anxiety     Flu Virus Vaccine Rash      Arm swelling     Past medical history, past surgical history, medications, and allergies were reviewed with the patient. Additional pertinent items: None    Family History  Family History   Problem Relation Age of Onset     Gastrointestinal Disease Brother         severe Crohn's     Neurologic Disorder Brother         Seizures post head injury     Depression Brother      Substance Abuse Brother      Genitourinary Problems Father         kidney stones     Diabetes Father      Heart Disease Father         Open heart surgery     Breast Cancer Maternal Grandmother      Parkinsonism Maternal Grandmother      Cerebrovascular Disease Paternal Grandmother      Cancer Maternal Grandfather         Lung     Cardiovascular Paternal Grandfather         Heart Attack     Substance Abuse Mother         in recovery x1 year      Lung Cancer Paternal Uncle      Cancer Paternal Uncle      Lung Cancer Maternal Uncle      Family history was reviewed with the patient. Additional pertinent  "items: None    Social History  Social History     Tobacco Use     Smoking status: Current Every Day Smoker     Packs/day: 0.25     Years: 5.00     Pack years: 1.25     Types: Cigarettes     Smokeless tobacco: Never Used   Substance Use Topics     Alcohol use: Not Currently     Comment: Not since last July 2018     Drug use: Yes     Types: Marijuana     Comment: Medical Marijuana currently-- More CBD      Social history was reviewed with the patient. Additional pertinent items: None    Review of Systems   Constitutional: Positive for appetite change and fever.   Respiratory: Negative for cough and shortness of breath.    Cardiovascular: Negative for chest pain.   Gastrointestinal: Positive for abdominal pain, anal bleeding, blood in stool, diarrhea, nausea and vomiting. Negative for constipation.   Genitourinary: Positive for dysuria and vaginal discharge. Negative for frequency and urgency.   Musculoskeletal: Positive for back pain.   All other systems reviewed and are negative.    A complete review of systems was performed with pertinent positives and negatives noted in the HPI, and all other systems negative.    Physical Exam   BP: (!) 157/84  Pulse: 119  Temp: 99.3  F (37.4  C)  Resp: 16  Height: 154.9 cm (5' 1\")  Weight: 87.2 kg (192 lb 4.8 oz)  SpO2: 99 %  Physical Exam  Vitals signs and nursing note reviewed.   Constitutional:       General: She is not in acute distress.     Appearance: She is well-developed. She is obese. She is ill-appearing. She is not toxic-appearing or diaphoretic.   HENT:      Head: Normocephalic and atraumatic.      Nose: Nose normal.      Mouth/Throat:      Mouth: Mucous membranes are dry.   Eyes:      General: No scleral icterus.     Conjunctiva/sclera: Conjunctivae normal.   Neck:      Musculoskeletal: Normal range of motion and neck supple. No neck rigidity.   Cardiovascular:      Rate and Rhythm: Regular rhythm. Tachycardia present.      Heart sounds: Normal heart sounds. "   Pulmonary:      Effort: Pulmonary effort is normal. No respiratory distress.      Breath sounds: Normal breath sounds. No stridor. No wheezing, rhonchi or rales.   Abdominal:      General: Bowel sounds are normal. There is no distension.      Palpations: Abdomen is soft. There is no mass.      Tenderness: There is generalized abdominal tenderness and tenderness in the suprapubic area. There is guarding.   Genitourinary:     Rectum: Tenderness present. No mass, anal fissure, external hemorrhoid or internal hemorrhoid. Normal anal tone.      Comments: No stool or blood in rectal vault. Diffusely tender on exam both internally and in external perianal region. No visualized fissures or fluctuance/drainage.     Declined vaginal exam  Musculoskeletal: Normal range of motion.         General: No deformity or signs of injury.   Skin:     General: Skin is warm and dry.      Coloration: Skin is not jaundiced or pale.      Findings: No rash.   Neurological:      General: No focal deficit present.      Mental Status: She is alert and oriented to person, place, and time.   Psychiatric:         Attention and Perception: Attention normal.         Mood and Affect: Mood normal.         Behavior: Behavior normal.         ED Course      Procedures                         Results for orders placed or performed during the hospital encounter of 04/17/20   MR Pelvis (Intrapelvic Organs) wo Contrast     Status: None    Narrative    EXAM: MRI PELVIS WITHOUT CONTRAST   LOCATION: [facility]  DATE/TIME: 4/17/2020 11:28 PM      INDICATION: History of Crohn's disease. Bloody stool this evening. History of anal fistula.     COMPARISON: CT 3/20/2020.    TECHNIQUE: Routine MRI anal protocol including T1 in/out phase, diffusion, thin section high resolution T2 images.    FINDINGS:   PERIANAL TISSUES: Very tiny linear focus of high T2 signal along the posterior margin of the anus at 6 o'clock position consistent with a tiny fissure or  fistula. This is seen, for example, on series 5, image 15. This measures approximately 8 mm in   length and 2 mm in diameter. Minimal surrounding fat stranding/inflammation but no significant associated fluid collection or abscess.    ADDITIONAL FINDINGS: Uterus is present. Dominant follicle right ovary measuring 1.5 cm. No ascites, fluid collections, or other significant findings in the visualized portion of the pelvis.      Impression    IMPRESSION:  1.  Tiny posterior anal fissure or fistula.    2.  No associated fluid collection or abscess.     CBC with platelets differential     Status: None   Result Value Ref Range    WBC 7.4 4.0 - 11.0 10e9/L    RBC Count 4.03 3.8 - 5.2 10e12/L    Hemoglobin 13.3 11.7 - 15.7 g/dL    Hematocrit 40.3 35.0 - 47.0 %     78 - 100 fl    MCH 33.0 26.5 - 33.0 pg    MCHC 33.0 31.5 - 36.5 g/dL    RDW 12.1 10.0 - 15.0 %    Platelet Count 240 150 - 450 10e9/L    Diff Method Automated Method     % Neutrophils 58.5 %    % Lymphocytes 29.4 %    % Monocytes 10.9 %    % Eosinophils 0.8 %    % Basophils 0.3 %    % Immature Granulocytes 0.1 %    Nucleated RBCs 0 0 /100    Absolute Neutrophil 4.3 1.6 - 8.3 10e9/L    Absolute Lymphocytes 2.2 0.8 - 5.3 10e9/L    Absolute Monocytes 0.8 0.0 - 1.3 10e9/L    Absolute Eosinophils 0.1 0.0 - 0.7 10e9/L    Absolute Basophils 0.0 0.0 - 0.2 10e9/L    Abs Immature Granulocytes 0.0 0 - 0.4 10e9/L    Absolute Nucleated RBC 0.0    Comprehensive metabolic panel     Status: Abnormal   Result Value Ref Range    Sodium 140 133 - 144 mmol/L    Potassium 3.9 3.4 - 5.3 mmol/L    Chloride 111 (H) 94 - 109 mmol/L    Carbon Dioxide 22 20 - 32 mmol/L    Anion Gap 8 3 - 14 mmol/L    Glucose 86 70 - 99 mg/dL    Urea Nitrogen 16 7 - 30 mg/dL    Creatinine 0.87 0.52 - 1.04 mg/dL    GFR Estimate 89 >60 mL/min/[1.73_m2]    GFR Estimate If Black >90 >60 mL/min/[1.73_m2]    Calcium 8.7 8.5 - 10.1 mg/dL    Bilirubin Total 0.6 0.2 - 1.3 mg/dL    Albumin 3.8 3.4 - 5.0 g/dL     Protein Total 7.2 6.8 - 8.8 g/dL    Alkaline Phosphatase 99 40 - 150 U/L    ALT 39 0 - 50 U/L    AST 50 (H) 0 - 45 U/L   CRP inflammation     Status: None   Result Value Ref Range    CRP Inflammation <2.9 0.0 - 8.0 mg/L   Erythrocyte sedimentation rate auto     Status: None   Result Value Ref Range    Sed Rate 9 0 - 20 mm/h   Blood culture     Status: None (Preliminary result)    Specimen: Hand, Left; Blood    Venipuncture Collection VPT   Result Value Ref Range    Specimen Description Blood Venipuncture Collection VPT     Culture Micro PENDING    Blood culture     Status: None (Preliminary result)    Specimen: Hand, Right; Blood    Venipuncture Collection VPT   Result Value Ref Range    Specimen Description Blood Venipuncture Collection VPT     Culture Micro PENDING      Medications   0.9% sodium chloride BOLUS (1,000 mLs Intravenous New Bag 4/17/20 2958)     Followed by   sodium chloride 0.9% infusion (has no administration in time range)   acetaminophen (TYLENOL) tablet 650 mg (has no administration in time range)   acetaminophen (TYLENOL) Suppository 650 mg (has no administration in time range)   naloxone (NARCAN) injection 0.1-0.4 mg (has no administration in time range)   melatonin tablet 1 mg (has no administration in time range)   ondansetron (ZOFRAN-ODT) ODT tab 4 mg (has no administration in time range)     Or   ondansetron (ZOFRAN) injection 4 mg (has no administration in time range)   prochlorperazine (COMPAZINE) injection 10 mg (has no administration in time range)     Or   prochlorperazine (COMPAZINE) tablet 10 mg (has no administration in time range)     Or   prochlorperazine (COMPAZINE) Suppository 25 mg (has no administration in time range)   diphenhydrAMINE (BENADRYL) capsule 25 mg (has no administration in time range)   ARIPiprazole (ABILIFY) tablet 15 mg (has no administration in time range)   aspirin (ASA) tablet 81 mg (has no administration in time range)   buPROPion (WELLBUTRIN XL) 24 hr  tablet 150 mg (has no administration in time range)   busPIRone (BUSPAR) tablet 15 mg (has no administration in time range)   clonazePAM (klonoPIN) tablet 1 mg (has no administration in time range)   DULoxetine (CYMBALTA) DR capsule 60 mg (has no administration in time range)   fexofenadine (ALLEGRA) tablet 180 mg (has no administration in time range)   gabapentin (NEURONTIN) capsule 300-900 mg (has no administration in time range)   losartan (COZAAR) tablet 100 mg (has no administration in time range)   medical cannabis self-directed use allowed by Medical Cannabis Policy (has no administration in time range)   metoprolol succinate ER (TOPROL-XL) 24 hr tablet 200 mg (has no administration in time range)   pantoprazole (PROTONIX) EC tablet 40 mg (has no administration in time range)   prazosin (MINIPRESS) capsule 1 mg (has no administration in time range)   rizatriptan (MAXALT-MLT) ODT tab 5-10 mg (has no administration in time range)   sucralfate (CARAFATE) tablet 1 g (has no administration in time range)   hydrocortisone (PROCTOCORT) 10 % rectal foam 1 applicator (has no administration in time range)   lidocaine (XYLOCAINE) 5 % ointment (has no administration in time range)   ketorolac (TORADOL) injection 15 mg (has no administration in time range)   HYDROmorphone (PF) (DILAUDID) injection 0.5 mg (0.5 mg Intravenous Given 4/18/20 0052)   diphenhydrAMINE (BENADRYL) injection 25 mg (25 mg Intravenous Given 4/17/20 2239)   diazepam (VALIUM) tablet 5 mg (5 mg Oral Given 4/17/20 2240)        Assessments & Plan (with Medical Decision Making)   Katy Islas is a 30 year old female with a past medical history significant for congenital hearing loss (requires ASL interpretor), Crohn's disease, GERD, HTN, C. Difficile diarrhea, renal disease, bipolar disorder, and fibromyalgia, being followed by colorectal surgery for a recent perianal abscess (I&D'd 3/3/20) anal fissure, and concern for possible anal fistula who  presents to the ED today with complaint of fever, abdominal pain, rectal burning pain, bloody stools, dysuria, and vaginal discharge.     Ddx: perianal fistula, perirectal abscess, rectovaginal or rectovesicular fistula, UTI, Crohn's flare, acute blood loss anemia from lower GIB, bacterial colitis    Patient's labs delayed by difficulty with IV access. Given IVF, zofran, dilaudid for symptomatic management. Will obtain MRI pelvis for eval fistula. Patient reports difficulty tolerating PO and significant pain not controlled with home tylenol. Appears dry. No respiratory sxs to warrant COVID testing.     MRI resulted w/ tiny posterior anal fissure or fistula. No abscess. Hgb nl. Still awaiting urine and stool samples. Inflammatory markers wnl.  Will hold empiric abx for now.     Admit to medicine for pain control and IVF. GI/colorectal consult as indicated.       I have reviewed the nursing notes. I have reviewed the findings, diagnosis, plan and need for follow up with the patient.    Current Discharge Medication List          Final diagnoses:   Anal fistula   Lower abdominal pain   Rectal bleeding   Non-intractable vomiting with nausea, unspecified vomiting type       --    Emergency Medicine   [facility], [place], EMERGENCY DEPARTMENT  4/17/2020     Caitlyn Wallace MD  04/18/20 0203

## 2020-04-18 NOTE — PROGRESS NOTES
"Patient arrived to the unit from ED at 0200. Upon arrival, pt was pleasant and compliant with all admission questions and assessment.     Pt began to complain of pain, itching, and nausea - PRN tylenol, benadryl, and compazine given. Pt continued to complain of pain, but refused toradol and stated that she wanted morphine or she wants to leave. [facility] team notified of pt's comments and order placed for 1x dose of PO oxy - but no IV pain meds would be prescribed. Pt refused oxy stating it will make her sick. As RN attempted to explain to pt that the team would not be prescribing any IV opioids, pt became upset and started swearing at writer calling her a \"bitch,\" shouting at her to get out of her room and stating she wants to go home. Writer informed charge and AMA form was printed. As writer brought form to pt -  #[id number] was contacted to assist with translation. Pt started yelling again and swearing at writer becoming verbally abusive and refusing to sign form - crumpled it up and threw it away. Code 21 was called - MD, along with float RN, and writer entered pt's room to discuss AMA and pt's situation.  #[id number] was contacted again and assisted MD in explaining to pt her diagnosis and MRI results - pt started telling MD that she does not believe him and we are not helping her here. MD informed pt that he would be willing to help pt with pain and nausea with PO options, but pt was unwilling to take PO meds and started shouting to take her IV out because she was leaving. AMA form signed and pt left the unit at 0356. FYI sent to team.  "

## 2020-04-18 NOTE — H&P
[facility] History and Physical        Date of Admission:  4/17/2020  Date of Service: 4/17/2020         HPI      Chief Complaint   Rectal bleeding    History is obtained from the patient and electronic health record    History of Present Illness   Katy is a 30 year old female who has a history of possible Crohn's disease, renal disease, GERD, HTN, bipolar disorder, likely BPD, fibromyalgia and drug seeking behavior, as well as congenital hearing loss requiring ASL interprester and presented to rectal bleeding.     Patient is seen by Colorectal outpatient. She most recently had a visit yesterday with concern for anal fissure/perianal abscess. She had I&D of perianal abscess in ER on 3/3/2020, and diagnosed on 3/10/2020 with anal fissure. Had CT AP 3/20/2020 which didn't show any obvious abnormalities. On 4/11/2020 presented to the ED, but left AMA. Anoscopy was performed at visit yesterday and revealed a small opening in the posterior midline just inside the anal verge. No undrained abscess was felt at that time. She was scheduled for outpatient MRI    She presents today to the ED with worsened abdominal pain, bloody stool, nausea, and vomiting. She is saying she has had these symptoms ongoing for the past 2 months, but worsened earlier today. Has associated fluctuating fevers and chills. Abdominal pain comes in waves. Also reporting vaginal discharge which is primarily clear with white in it at times. Has associated vaginal pruritis. No new sexual exposures. Has had significantly decreased appetite, stating she has lost 6 lbs over the course of a week.      ED workup:  - UA with small blood, small leukocyte estrase, 12 RBC, 8 WBC, few bacteria, 3 squamous epithelial, mucous present  - CBC: WBC 7.4, Hgb 13.3, Plt 240, all WNL  - CMP: Cl 111, AST 50, otherwise WNL  - CRP <2.9  - ESR 9  - blood cultures drawn and pending  - Enteric panel and c diff  ordered and needs collecting.   - MRI Pelvis without contrast:  1.  Tiny posterior anal fissure or fistula.  2.  No associated fluid collection or abscess.    Received 2L fluid in the emergency department. Also had diazepam and benadryl prior to MRI. She received 3x 0.5 mg dilaudid IV. She continues to feel quite pruritic with waxing and waning abdominal pain.     List of recent visits to the ER, copied from ED note from 4/11, and updated.  3/3/2020: [place] [facility] - perianal abscess with I&D at that time  3/4/2020 - [facility] - Dr. Alamo, abscess looked good, no additional plan of treatment  3/6/2020 - [facility] - Dr. Alamo - looks good,   3/9/2020: [place] ED - fever - diagnosis adverse effect of antibiotic and diarrhea  3/10/2020: [facility] - perianal fissure -   3/15/2020: [facility] ED: perirectal abscess  3/19/2020: [facility]: UTI symptoms  3/20/2020: [facility] ED: abd pain; normal CT abd/pelvis; normal CRP and ESR, WBC normal  3/22/2020: [facility] ED: healing rectal abscess area / fissure?  3/23/2020: E-Visit: started on Klonopin and bupropion for depression   3/25/2020: [facility] - perianal fissure - given IV pain medications, had normal labs, and was sent home.  3/26 [facility] ER visit  3/29 [facility] ER visit. Labs stable. No abscess seen on exam. Pt requested Rx for opiates on discharge, was told ER cannot prescribe opiates for her chronic pain issue  4/1 [facility] ER visit, concern for drug seeking behavior and that pt needs to f/u with appropriate specialist, no abscess palpated during that visit. Afebrile  4/4 [facility] ER visit, noted to have cough productive of white sputum at that time, CXR read copied below. Again, she requested narcotic pain medications as well as zofran  4/11 [facility] ER visit, again complaint of anal abscess. Labs stable. No abscess palpated. Left AMA  4/13  [facility] ER, rectal pain. Labs stable, CT AP no acute findings. No perirectal air or fluid collection.   4/16 telephone visit with colorectal surgery. Set up for outpatient MRI to assess for fistula/abscess.            History:   Review of Systems   The 10 point Review of Systems is negative other than noted in the HPI or here.   Review of Systems   Constitutional: Positive for appetite change, chills and fever.   HENT: Negative for congestion, rhinorrhea and sore throat.    Eyes: Negative for visual disturbance.   Respiratory: Negative for cough and shortness of breath.    Cardiovascular: Negative for chest pain.   Gastrointestinal: Positive for abdominal pain, anal bleeding, nausea, rectal pain and vomiting.   Genitourinary: Positive for vaginal discharge. Negative for dysuria and hematuria.   Musculoskeletal: Negative for back pain.   Skin: Negative for rash.   Neurological: Negative for dizziness, weakness, light-headedness, numbness and headaches.       Past Medical History    I have reviewed this patient's medical history and updated it with pertinent information if needed.   Past Medical History:   Diagnosis Date     Abdominal pain 10/31- 11/4/2005    [facility] for [facility]     Allergic rhinitis, cause unspecified     Allergic rhinitis     Arthritis      C. difficile diarrhea     Past, no current diarrhea.     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, without mention of intractable epilepsy     pseudoseizures diagnosed after extensive  neurologic eval     Migraine 07/21/12    D/C 07/22/12-[facility]     Migraine, unspecified, without mention of intractable migraine without mention of status migrainosus     Migraine     Mild intermittent asthma     mild intermittent     Mycoplasma infection in conditions classified elsewhere and of unspecified site      Other chronic pain     Back pain for 6 years     Renal disease     Kidney stones     Unspecified hearing loss     congenital hearing loss        Past Surgical History   I have reviewed this patient's surgical history and updated it with pertinent information if needed.  Past Surgical History:   Procedure Laterality Date     AS REMOVAL OF COCCYX  10/18/2017     C FUSION OF SACROILIAC JOINT  10/26/2018     COLONOSCOPY  7/1/2009    [facility].     COLONOSCOPY N/A 7/17/2019    Procedure: Colonoscopy, With Polypectomy And Biopsy;  Surgeon: Edwin Conde MD;  Location: [facility] OR     COLONOSCOPY WITH CO2 INSUFFLATION N/A 7/17/2019    Procedure: COLONOSCOPY, WITH CO2 INSUFFLATION;  Surgeon: Edwin Conde MD;  Location: [facility] OR     COMBINED ESOPHAGOSCOPY, GASTROSCOPY, DUODENOSCOPY (EGD) WITH CO2 INSUFFLATION N/A 4/12/2019    Procedure: COMBINED ESOPHAGOSCOPY, GASTROSCOPY, DUODENOSCOPY (EGD) WITH CO2 INSUFFLATION;  Surgeon: Edwin Conde MD;  Location: [facility] OR     ESOPHAGOSCOPY, GASTROSCOPY, DUODENOSCOPY (EGD), COMBINED N/A 4/12/2019    Procedure: Combined Esophagoscopy, Gastroscopy, Duodenoscopy (Egd), Biopsy Single Or Multiple;  Surgeon: Edwin Conde MD;  Location: [facility] OR     FUSION SPINE POSTERIOR MINIMALLY INVASIVE ONE LEVEL N/A 2/23/2017    Procedure: FUSION SPINE POSTERIOR MINIMALLY INVASIVE ONE LEVEL;  L4-5 Oblique Lateral Lumbar Interbody Fusion   Epidural steroid injection.   Transpedicular Bone marrow aspiration;  Surgeon: Jeniffer Eugene MD;  Location:  OR      COLONOSCOPY THRU STOMA WITH BIOPSY  10/29/2003    Impression is  that of normal appearing colonoscopy, without evidence of rectal bleeding.     HC COLONOSCOPY THRU STOMA, DIAGNOSTIC  10/00    normal     HC COLONOSCOPY THRU STOMA, DIAGNOSTIC  Oct 2009    Dr López- normal     HC EEG AWAKE AND SLEEP      abnormal     HC MRI BRAIN W/O CONTRAST  12/00    normal     HC REMOVAL GALLBLADDER  8/5/2009    [facility] [place].     HC UGI ENDOSCOPY DIAG W BIOPSY  11/11/09    Normal esophagus     HC UGI ENDOSCOPY, SIMPLE EXAM  7/00, 10/00    mild chronic esophagitis and duodenitis, neg H pylori     HC UGI ENDOSCOPY, SIMPLE EXAM  01/20/2005    Esophagogastroduodenoscopy, colonoscopy with biopsies.  [facility]     HC UGI ENDOSCOPY, SIMPLE EXAM  7/1/2009    [facility], [facility].     HC UGI ENDOSCOPY, SIMPLE EXAM  11/11/2009    attempted upper GI, pt. could not tolerate procedure:[facility]     ORTHOPEDIC SURGERY  October 19,2011    diskectomy L4-L5        Social History   Social History     Tobacco Use     Smoking status: Current Every Day Smoker     Packs/day: 0.25     Years: 5.00     Pack years: 1.25     Types: Cigarettes     Smokeless tobacco: Never Used   Substance Use Topics     Alcohol use: Not Currently     Comment: Not since last July 2018     Drug use: Yes     Types: Marijuana     Comment: Medical Marijuana currently-- More CBD       Family History   I have reviewed this patient's family history and updated it with pertinent information if needed.   Family History   Problem Relation Age of Onset     Gastrointestinal Disease Brother         severe Crohn's     Neurologic Disorder Brother         Seizures post head injury     Depression Brother      Substance Abuse Brother      Genitourinary Problems Father         kidney stones     Diabetes Father      Heart Disease Father         Open heart surgery     Breast Cancer Maternal Grandmother      Parkinsonism Maternal Grandmother      Cerebrovascular Disease Paternal Grandmother      Cancer  Maternal Grandfather         Lung     Cardiovascular Paternal Grandfather         Heart Attack     Substance Abuse Mother         in recovery x1 year      Lung Cancer Paternal Uncle      Cancer Paternal Uncle      Lung Cancer Maternal Uncle        Prior to Admission Medications   Prior to Admission Medications   Prescriptions Last Dose Informant Patient Reported? Taking?   ARIPiprazole (ABILIFY) 15 MG tablet   No No   Sig: Take 1 tablet (15 mg) by mouth At Bedtime   DULoxetine (CYMBALTA) 60 MG EC capsule   No No   Sig: Take 1 capsule (60 mg) by mouth daily   EPINEPHrine (ANY BX GENERIC EQUIV) 0.3 MG/0.3ML injection 2-pack   No No   Sig: Inject 0.3 mLs (0.3 mg) into the muscle once as needed for anaphylaxis   NONFORMULARY   Yes No   Sig: Apply 30 mLs topically 4 times daily   acetaminophen (TYLENOL) 500 MG tablet   No No   Sig: Take 2 tablets (1,000 mg) by mouth 3 times daily as needed for mild pain   albuterol (PROAIR HFA/PROVENTIL HFA/VENTOLIN HFA) 108 (90 Base) MCG/ACT inhaler   No No   Sig: Inhale 2 puffs into the lungs every 6 hours as needed for shortness of breath / dyspnea or wheezing   albuterol (PROVENTIL) (2.5 MG/3ML) 0.083% neb solution   No No   Sig: Take 1 vial (2.5 mg) by nebulization every 6 hours as needed for shortness of breath / dyspnea or wheezing   alum & mag hydroxide-simethicone (MAALOX ADVANCED MAX ST) 400-400-40 MG/5ML SUSP suspension   No No   Sig: Take 15 mLs by mouth every 4 hours as needed for indigestion (mix with lidocaine)   aspirin (ASA) 81 MG tablet   No No   Sig: Take 1 tablet (81 mg) by mouth daily   azelastine (OPTIVAR) 0.05 % ophthalmic solution   No No   Sig: Place 1 drop into both eyes 2 times daily   buPROPion (WELLBUTRIN XL) 150 MG 24 hr tablet   No No   Sig: Take 1 tablet (150 mg) by mouth every morning   busPIRone (BUSPAR) 15 MG tablet   No No   Sig: Take 1 tablet (15 mg) by mouth 2 times daily   clonazePAM (KLONOPIN) 1 MG tablet   No No   Sig: Take 1 tablet (1 mg) by  mouth 2 times daily as needed for anxiety   cyclobenzaprine (FLEXERIL) 10 MG tablet   No No   Sig: Take 1 tablet (10 mg) by mouth 3 times daily as needed for muscle spasms or other (back pain)   diazepam (VALIUM) 5 MG tablet   No No   Sig: Take 1 tablet (5 mg) by mouth once for 1 dose   diphenhydrAMINE (BENADRYL) 50 MG capsule   No No   Sig: Take 1-2 capsules ( mg) by mouth every 6 hours as needed for itching, allergies or sleep   fexofenadine (ALLEGRA) 180 MG tablet   No No   Sig: Take 1 tablet (180 mg) by mouth daily   gabapentin (NEURONTIN) 300 MG capsule   No No   Sig: Take 1-3 capsules (300-900 mg) by mouth 3 times daily as needed (back pain)   lidocaine (XYLOCAINE) 2 % solution   No No   Sig: Swish and swallow 15 mLs in mouth every 4 hours as needed for other (GERD) ; Max 8 doses/24 hour period. Mix with 15 mLs Maalox or Mylanta.   losartan (COZAAR) 100 MG tablet   No No   Sig: Take 1 tablet (100 mg) by mouth daily   medical cannabis (Patient's own supply)   Yes No   Sig: (The purpose of this order is to document that the patient reports taking medical cannabis.  This is not a prescription, and is not used to certify that the patient has a qualifying medical condition.)   medroxyPROGESTERone (DEPO-PROVERA) 150 MG/ML IM injection   No No   Sig: Inject 1 mL (150 mg) into the muscle every 3 months   methocarbamol (ROBAXIN) 750 MG tablet   No No   Sig: Take 1 tablet (750 mg) by mouth 3 times daily as needed for muscle spasms   metoprolol succinate ER (TOPROL-XL) 200 MG 24 hr tablet   No No   Sig: Take 1 tablet (200 mg) by mouth daily   ondansetron (ZOFRAN ODT) 4 MG ODT tab   No No   Sig: Take 2 tablets (8 mg) by mouth every 6 hours as needed for nausea or vomiting   ondansetron (ZOFRAN ODT) 8 MG ODT tab   No No   Sig: Take 1 tablet (8 mg) by mouth every 8 hours as needed for nausea   pantoprazole (PROTONIX) 40 MG EC tablet   No No   Sig: Take 1 tablet (40 mg) by mouth 2 times daily Take 30-60 minutes before  a meal.   prazosin (MINIPRESS) 1 MG capsule   Yes No   Sig: Take 1 mg by mouth At Bedtime   prochlorperazine (COMPAZINE) 10 MG tablet   No No   Sig: Take 1 tablet (10 mg) by mouth every 6 hours as needed for nausea or vomiting   rizatriptan (MAXALT-MLT) 5 MG ODT   No No   Sig: Take 1-2 tablets (5-10 mg) by mouth at onset of headache for migraine May repeat in 2 hours. Max 6 tablets/24 hours.   sucralfate (CARAFATE) 1 GM tablet   No No   Sig: Take 1 tablet (1 g) by mouth 4 times daily as needed (heartburn)   valACYclovir (VALTREX) 1000 mg tablet   Yes No   Si tabs at onset of mouth sores.  Repeat dose in 12 hours.      Facility-Administered Medications Last Administration Doses Remaining   diazepam (VALIUM) tablet 5 mg None recorded 1   medroxyPROGESTERone (DEPO-PROVERA) injection 150 mg 2019 11:31 AM 3   medroxyPROGESTERone (DEPO-PROVERA) injection 150 mg 2020  9:09 AM 3   medroxyPROGESTERone (DEPO-PROVERA) syringe 150 mg None recorded 4        Allergies   Allergies   Allergen Reactions     Iohexol Hives     Other reaction(s): Hives  IV contrast; patient states she tolerates contrast if she gets benadryl before  IV contrast; patient states she tolerates contrast if she gets benadryl before     Ativan [Lorazepam] Hives     At the IV site     Baclofen      hives     Bees Hives, Swelling and Difficulty breathing     Caffeine      Contrast Dye      Hives,   Updated 5/10/2016 CT Contrast.     Iodine Hives     Methocarbamol Swelling     Metoclopramide      Other reaction(s): Tremors  LW Reaction: shaking/sweating     Midazolam      Other reaction(s): Agitation     Monosodium Glutamate      Morphine Other (See Comments)     Difficulty with urination     Nsaids Other (See Comments)     GI BLEED x2     Other (Do Not Use) Other (See Comments)     Xanaflex- pt becomes disoriented and loses bladder control     Reglan [Metoclopramide Hcl] Other (See Comments)     shaking     Soma [Carisoprodol] Visual Disturbance      Sleep walking     Tizanidine      Topamax Other (See Comments)     Topamax [Topiramate] Nausea     Tingling  GI/Vomit     Tramadol      Severe Headache, Seizure Risk     Tylenol W/Codeine [Acetaminophen-Codeine] Nausea and Itching     Tylenol 3     Versed Other (See Comments)     Zolmitriptan      Makes face feel like its twitching     Droperidol Anxiety     Flu Virus Vaccine Rash      Arm swelling           Physical Exam      Vital Signs: Temp: 99.3  F (37.4  C) Temp src: Oral BP: 134/89 Pulse: 119   Resp: 16 SpO2: 96 % O2 Device: None (Room air)    Weight: 192 lbs 4.8 oz    Physical Exam  Constitutional:       Appearance: Normal appearance.      Comments: Uncomfortable appearing   HENT:      Head: Normocephalic and atraumatic.      Right Ear: Decreased hearing noted.      Left Ear: Decreased hearing noted.      Ears:      Comments: Wears hearing aid in right ear.      Mouth/Throat:      Pharynx: Oropharynx is clear.      Comments: Tachy mucous membranes  Eyes:      General: No scleral icterus.     Extraocular Movements: Extraocular movements intact.      Conjunctiva/sclera: Conjunctivae normal.   Neck:      Musculoskeletal: Normal range of motion.   Cardiovascular:      Rate and Rhythm: Normal rate and regular rhythm.      Heart sounds: Normal heart sounds.   Pulmonary:      Effort: Pulmonary effort is normal.      Breath sounds: Normal breath sounds. No wheezing, rhonchi or rales.   Abdominal:      General: Abdomen is flat. Bowel sounds are normal.      Palpations: Abdomen is soft.      Tenderness: There is abdominal tenderness (diffusely).   Musculoskeletal: Normal range of motion.      Right lower leg: No edema.      Left lower leg: No edema.   Skin:     General: Skin is warm and dry.      Capillary Refill: Capillary refill takes 2 to 3 seconds.   Neurological:      General: No focal deficit present.      Mental Status: She is alert and oriented to person, place, and time.         Assessment & Plan      Katy  is a 30 year old female admitted on 4/17/2020. She has a history of possible Crohn's disease, renal disease, GERD, HTN, bipolar disorder, likely BPD, fibromyalgia and drug seeking behavior, as well as congenital hearing loss requiring ASL interprester and presented to rectal bleeding.     # Anal fissure  # Chronic abdominal pain  # IBS  Patient has been followed by GI and CR surg outpatient. In a GI note from 1/22/2020, it is noted that multiple workups have been completed from GI standpoint without cause or explanation of her pain, including not definitively showing signs of Crohn's or inflammatory bowel disease. Suspected her symptoms were related to IBS-D and/or related to endometriosis. She had I&D of perianal abscess in the ED on 3/3/2020 and diagnosed with anal fissure on 3/10/2020. CT AP 3/20/2020 WNL. See HPI for full recent visit list. MRI completed in ED today, noted only to have anal fissure, no abscess. Labs largely unremarkable. Afebrile here. Patient's pain appears to be congruent with chronic abdominal pain. All labs, including inflammatory markers, are normal. Consistent with prior ED visits concerning for pain seeking behavior.   - Plan for pain control with tylenol, topicals, home medications.   - Proctofoam, lidocaine cream, toradol   - one time oxycodone dose  - GI consult in the morning    # nausea  Patient states she has had nausea with vomiting ongoing for the past 2 months associated with abdominal and rectal pain. Also noting decreased appetite.   - prn zofran or compazine    # Pruritis  Likely secondary to dilaudid use.   - benadryl q4h PO prn    # Chronic conditions:  Chronic pain: medical cannabis,   Depression with anxiety: abilify, wellbutrin, buspar, duloxetine, prn clonazepam  Hypertension: losartan, prazosin, metoprolol  GERD: pantoprazole   Migraines: prn triptan  Asthma/allergies: allegra    # Pain Assessment:  Current Pain Score 4/4/2020   Patient currently in pain? -   Pain score  (0-10) 7   Pain location -   Pain descriptors -   - Katy is experiencing pain due to abdominal and rectal pain. Pain management was discussed and the plan was created in a collaborative fashion.  Katy's response to the current recommendations: engaged  - Please see the plan for pain management as documented above      Diet: Advance Diet as Tolerated: Clear Liquid Diet  Fluids: po   DVT Prophylaxis: Pneumatic Compression Devices  Code Status: Full Code    Disposition Plan   Expected discharge: Tomorrow; recommended to prior living arrangement once follow up plan established. Dispo:          Entered: Angélica Rios 04/17/2020, 11:37 PM   Information in the above section will display in the discharge planner report.    Discussed with faculty but not examined by faculty.    Angélica Rios MD PGY-1    [facility] Inpatient [facility]  [facility]   Pager: [phone]  Please see sticky note for cross cover information      Results:     Data   Recent Labs   Lab 04/18/20  0028 04/11/20  1823   WBC 7.4 8.4   HGB 13.3 13.9    98    309    139   POTASSIUM 3.9 3.5   CHLORIDE 111* 107   CO2 22 26   BUN 16 16   CR 0.87 0.85   ANIONGAP 8 6   SARANYA 8.7 8.9   GLC 86 86   ALBUMIN 3.8 4.0   PROTTOTAL 7.2 7.7   BILITOTAL 0.6 0.2   ALKPHOS 99 98   ALT 39 59*   AST 50* 29   LIPASE  --  87     Most Recent 3 CBC's:  Recent Labs   Lab Test 04/18/20  0028 04/11/20  1823 04/04/20  1726   WBC 7.4 8.4 5.6   HGB 13.3 13.9 13.7    98 97    309 292     Most Recent 3 BMP's:  Recent Labs   Lab Test 04/18/20  0028 04/11/20  1823 04/04/20  1726    139 143   POTASSIUM 3.9 3.5 4.2   CHLORIDE 111* 107 111*   CO2 22 26 28   BUN 16 16 15   CR 0.87 0.85 0.90   ANIONGAP 8 6 4   SARANYA 8.7 8.9 8.8   GLC 86 86 89     Most Recent 2 LFT's:  Recent Labs   Lab Test 04/18/20  0028 04/11/20  1823   AST 50* 29   ALT 39 59*   ALKPHOS 99 98   BILITOTAL 0.6 0.2     Recent Results (from the past 24 hour(s))  "  MR Pelvis (Intrapelvic Organs) wo Contrast    Narrative    EXAM: MRI PELVIS WITHOUT CONTRAST   LOCATION: [facility]  DATE/TIME: 4/17/2020 11:28 PM      INDICATION: History of Crohn's disease. Bloody stool this evening. History of anal fistula.     COMPARISON: CT 3/20/2020.    TECHNIQUE: Routine MRI anal protocol including T1 in/out phase, diffusion, thin section high resolution T2 images.    FINDINGS:   PERIANAL TISSUES: Very tiny linear focus of high T2 signal along the posterior margin of the anus at 6 o'clock position consistent with a tiny fissure or fistula. This is seen, for example, on series 5, image 15. This measures approximately 8 mm in   length and 2 mm in diameter. Minimal surrounding fat stranding/inflammation but no significant associated fluid collection or abscess.    ADDITIONAL FINDINGS: Uterus is present. Dominant follicle right ovary measuring 1.5 cm. No ascites, fluid collections, or other significant findings in the visualized portion of the pelvis.      Impression    IMPRESSION:  1.  Tiny posterior anal fissure or fistula.    2.  No associated fluid collection or abscess.               AMA       Patient has been educated on potential risks of choosing to leave the unit and the responsibility for patient well-being will belong to the patient. Pt has been informed that remaining in the hospital we would offer her non IV opiate treatment for her pain until assessment in AM by colorectal. She refused to take oxycodone, toradol, or topical lidocaine or steroids for her anal fissure pain.  She stopped talking to me during this conversation by not looking at the screen for the . Then stated on sign language without looking at him-  \"I'm not a colorectal doctor\", \"she has a major fistula she can feel it and doesn't believe the MRI read \" then refused to read the report I offered to show her.     She briefly began talking again saying her rectum isn't the source of her " pain it is her abdominal pain. This pain is the same as her chronic abdominal pain she was planning to have potential stimulator vs hysterectomy for as an outpatient. Her primary care doctor also has refused to continue opiate pain medicines for this reason. She has no signs or symptoms of needing to be kept on hold/committed. She left [id number]. Will message the NP who saw her last to ensure she gets connected with colorectal and possibly see if they want to do the special 3T MRI still as outpatient.     Dmitri Gonzalez MD

## 2020-04-18 NOTE — ED TRIAGE NOTES
Pt arrives to triage with complaints of rectal bleeding, fever, abdominal pain and cough. Pt reports 2 episodes of bloody stool this evening. Reports 1 episode of vomiting, no blood in vomit. Pt reports temp 100.8 at home, pt reports taking 3 tylenol around 1700. Triage completed via .  Upon chart review, pt has an anal fistula that is being managed by colo-rectal. Pt talked with colo-rectal yesterday. Pt is scheduled to have MRI on 4/19/2020.

## 2020-04-19 ENCOUNTER — PATIENT OUTREACH (OUTPATIENT)
Dept: CARE COORDINATION | Facility: CLINIC | Age: 30
End: 2020-04-19

## 2020-04-19 VITALS
WEIGHT: 188 LBS | DIASTOLIC BLOOD PRESSURE: 69 MMHG | HEIGHT: 62 IN | HEART RATE: 87 BPM | OXYGEN SATURATION: 94 % | TEMPERATURE: 98.6 F | SYSTOLIC BLOOD PRESSURE: 121 MMHG | BODY MASS INDEX: 34.6 KG/M2 | RESPIRATION RATE: 16 BRPM

## 2020-04-19 PROBLEM — K62.89 RECTAL PAIN: Status: ACTIVE | Noted: 2020-04-19

## 2020-04-19 LAB
ANION GAP SERPL CALCULATED.3IONS-SCNC: 6 MMOL/L (ref 3–14)
BASOPHILS # BLD AUTO: 0 10E9/L (ref 0–0.2)
BASOPHILS NFR BLD AUTO: 0.3 %
BUN SERPL-MCNC: 17 MG/DL (ref 7–30)
CALCIUM SERPL-MCNC: 9 MG/DL (ref 8.5–10.1)
CHLORIDE SERPL-SCNC: 107 MMOL/L (ref 94–109)
CO2 SERPL-SCNC: 26 MMOL/L (ref 20–32)
CREAT SERPL-MCNC: 0.88 MG/DL (ref 0.52–1.04)
CRP SERPL-MCNC: <2.9 MG/L (ref 0–8)
DIFFERENTIAL METHOD BLD: ABNORMAL
EOSINOPHIL # BLD AUTO: 0.2 10E9/L (ref 0–0.7)
EOSINOPHIL NFR BLD AUTO: 2.6 %
ERYTHROCYTE [DISTWIDTH] IN BLOOD BY AUTOMATED COUNT: 12.1 % (ref 10–15)
ERYTHROCYTE [SEDIMENTATION RATE] IN BLOOD BY WESTERGREN METHOD: 9 MM/H (ref 0–20)
GFR SERPL CREATININE-BSD FRML MDRD: 87 ML/MIN/{1.73_M2}
GLUCOSE SERPL-MCNC: 86 MG/DL (ref 70–99)
HCT VFR BLD AUTO: 37.9 % (ref 35–47)
HGB BLD-MCNC: 12.8 G/DL (ref 11.7–15.7)
IMM GRANULOCYTES # BLD: 0 10E9/L (ref 0–0.4)
IMM GRANULOCYTES NFR BLD: 0.3 %
LYMPHOCYTES # BLD AUTO: 2.6 10E9/L (ref 0.8–5.3)
LYMPHOCYTES NFR BLD AUTO: 42 %
MCH RBC QN AUTO: 33.3 PG (ref 26.5–33)
MCHC RBC AUTO-ENTMCNC: 33.8 G/DL (ref 31.5–36.5)
MCV RBC AUTO: 99 FL (ref 78–100)
MONOCYTES # BLD AUTO: 0.7 10E9/L (ref 0–1.3)
MONOCYTES NFR BLD AUTO: 11.6 %
NEUTROPHILS # BLD AUTO: 2.6 10E9/L (ref 1.6–8.3)
NEUTROPHILS NFR BLD AUTO: 43.2 %
NRBC # BLD AUTO: 0 10*3/UL
NRBC BLD AUTO-RTO: 0 /100
PLATELET # BLD AUTO: 233 10E9/L (ref 150–450)
POTASSIUM SERPL-SCNC: 4 MMOL/L (ref 3.4–5.3)
RBC # BLD AUTO: 3.84 10E12/L (ref 3.8–5.2)
SODIUM SERPL-SCNC: 138 MMOL/L (ref 133–144)
WBC # BLD AUTO: 6.1 10E9/L (ref 4–11)

## 2020-04-19 PROCEDURE — 25000132 ZZH RX MED GY IP 250 OP 250 PS 637: Mod: GY | Performed by: STUDENT IN AN ORGANIZED HEALTH CARE EDUCATION/TRAINING PROGRAM

## 2020-04-19 PROCEDURE — G0378 HOSPITAL OBSERVATION PER HR: HCPCS

## 2020-04-19 PROCEDURE — 25000132 ZZH RX MED GY IP 250 OP 250 PS 637: Mod: GY | Performed by: EMERGENCY MEDICINE

## 2020-04-19 PROCEDURE — 86140 C-REACTIVE PROTEIN: CPT | Performed by: EMERGENCY MEDICINE

## 2020-04-19 PROCEDURE — 85652 RBC SED RATE AUTOMATED: CPT | Performed by: EMERGENCY MEDICINE

## 2020-04-19 PROCEDURE — 80048 BASIC METABOLIC PNL TOTAL CA: CPT | Performed by: EMERGENCY MEDICINE

## 2020-04-19 PROCEDURE — 85025 COMPLETE CBC W/AUTO DIFF WBC: CPT | Performed by: EMERGENCY MEDICINE

## 2020-04-19 PROCEDURE — 25800030 ZZH RX IP 258 OP 636: Performed by: EMERGENCY MEDICINE

## 2020-04-19 PROCEDURE — 25800030 ZZH RX IP 258 OP 636: Performed by: STUDENT IN AN ORGANIZED HEALTH CARE EDUCATION/TRAINING PROGRAM

## 2020-04-19 PROCEDURE — 25000128 H RX IP 250 OP 636: Performed by: STUDENT IN AN ORGANIZED HEALTH CARE EDUCATION/TRAINING PROGRAM

## 2020-04-19 PROCEDURE — 25000128 H RX IP 250 OP 636: Performed by: EMERGENCY MEDICINE

## 2020-04-19 RX ORDER — ACETAMINOPHEN 325 MG/1
975 TABLET ORAL 3 TIMES DAILY
Status: DISCONTINUED | OUTPATIENT
Start: 2020-04-19 | End: 2020-04-19 | Stop reason: HOSPADM

## 2020-04-19 RX ORDER — AMOXICILLIN 250 MG
2 CAPSULE ORAL 2 TIMES DAILY PRN
Status: DISCONTINUED | OUTPATIENT
Start: 2020-04-19 | End: 2020-04-19 | Stop reason: HOSPADM

## 2020-04-19 RX ORDER — MEDROXYPROGESTERONE ACETATE 150 MG/ML
150 INJECTION, SUSPENSION INTRAMUSCULAR ONCE
Status: DISCONTINUED | OUTPATIENT
Start: 2020-04-19 | End: 2020-04-19 | Stop reason: HOSPADM

## 2020-04-19 RX ORDER — OXYCODONE HYDROCHLORIDE 5 MG/1
5 TABLET ORAL ONCE
Status: COMPLETED | OUTPATIENT
Start: 2020-04-19 | End: 2020-04-19

## 2020-04-19 RX ORDER — LOSARTAN POTASSIUM 50 MG/1
100 TABLET ORAL DAILY
Status: DISCONTINUED | OUTPATIENT
Start: 2020-04-19 | End: 2020-04-19 | Stop reason: HOSPADM

## 2020-04-19 RX ORDER — POLYETHYLENE GLYCOL 3350 17 G/17G
17 POWDER, FOR SOLUTION ORAL 2 TIMES DAILY
Qty: 60 PACKET | Refills: 0 | Status: SHIPPED | OUTPATIENT
Start: 2020-04-19 | End: 2021-01-19

## 2020-04-19 RX ORDER — METHOCARBAMOL 750 MG/1
750 TABLET, FILM COATED ORAL 3 TIMES DAILY PRN
Status: DISCONTINUED | OUTPATIENT
Start: 2020-04-19 | End: 2020-04-19 | Stop reason: HOSPADM

## 2020-04-19 RX ORDER — PROCHLORPERAZINE MALEATE 5 MG
10 TABLET ORAL EVERY 6 HOURS PRN
Status: DISCONTINUED | OUTPATIENT
Start: 2020-04-19 | End: 2020-04-19 | Stop reason: HOSPADM

## 2020-04-19 RX ORDER — DULOXETIN HYDROCHLORIDE 60 MG/1
60 CAPSULE, DELAYED RELEASE ORAL DAILY
Status: DISCONTINUED | OUTPATIENT
Start: 2020-04-19 | End: 2020-04-19 | Stop reason: HOSPADM

## 2020-04-19 RX ORDER — HYDROMORPHONE HYDROCHLORIDE 2 MG/1
2 TABLET ORAL EVERY 4 HOURS PRN
Status: DISCONTINUED | OUTPATIENT
Start: 2020-04-19 | End: 2020-04-19 | Stop reason: HOSPADM

## 2020-04-19 RX ORDER — FEXOFENADINE HCL 180 MG/1
180 TABLET ORAL DAILY
Status: DISCONTINUED | OUTPATIENT
Start: 2020-04-19 | End: 2020-04-19 | Stop reason: HOSPADM

## 2020-04-19 RX ORDER — ARIPIPRAZOLE 15 MG/1
15 TABLET ORAL AT BEDTIME
Status: DISCONTINUED | OUTPATIENT
Start: 2020-04-19 | End: 2020-04-19 | Stop reason: HOSPADM

## 2020-04-19 RX ORDER — GABAPENTIN 300 MG/1
300 CAPSULE ORAL 3 TIMES DAILY
Qty: 270 CAPSULE | Refills: 3
Start: 2020-04-19 | End: 2021-07-06

## 2020-04-19 RX ORDER — DIPHENHYDRAMINE HYDROCHLORIDE 50 MG/ML
25 INJECTION INTRAMUSCULAR; INTRAVENOUS ONCE
Status: COMPLETED | OUTPATIENT
Start: 2020-04-19 | End: 2020-04-19

## 2020-04-19 RX ORDER — PANTOPRAZOLE SODIUM 40 MG/1
40 TABLET, DELAYED RELEASE ORAL 2 TIMES DAILY
Status: DISCONTINUED | OUTPATIENT
Start: 2020-04-19 | End: 2020-04-19 | Stop reason: HOSPADM

## 2020-04-19 RX ORDER — HYDROMORPHONE HYDROCHLORIDE 2 MG/1
2 TABLET ORAL EVERY 4 HOURS PRN
Qty: 12 TABLET | Refills: 0 | Status: SHIPPED | OUTPATIENT
Start: 2020-04-19 | End: 2020-08-05

## 2020-04-19 RX ORDER — GABAPENTIN 300 MG/1
300 CAPSULE ORAL 3 TIMES DAILY
Status: DISCONTINUED | OUTPATIENT
Start: 2020-04-19 | End: 2020-04-19 | Stop reason: HOSPADM

## 2020-04-19 RX ORDER — PRAZOSIN HYDROCHLORIDE 1 MG/1
1 CAPSULE ORAL AT BEDTIME
Status: DISCONTINUED | OUTPATIENT
Start: 2020-04-19 | End: 2020-04-19 | Stop reason: HOSPADM

## 2020-04-19 RX ORDER — METOPROLOL SUCCINATE 200 MG/1
200 TABLET, EXTENDED RELEASE ORAL DAILY
Status: DISCONTINUED | OUTPATIENT
Start: 2020-04-19 | End: 2020-04-19 | Stop reason: HOSPADM

## 2020-04-19 RX ORDER — CLONAZEPAM 1 MG/1
1 TABLET ORAL 2 TIMES DAILY PRN
Status: DISCONTINUED | OUTPATIENT
Start: 2020-04-19 | End: 2020-04-19 | Stop reason: HOSPADM

## 2020-04-19 RX ORDER — BISACODYL 10 MG
10 SUPPOSITORY, RECTAL RECTAL DAILY PRN
Status: DISCONTINUED | OUTPATIENT
Start: 2020-04-19 | End: 2020-04-19

## 2020-04-19 RX ORDER — POLYETHYLENE GLYCOL 3350 17 G/17G
17 POWDER, FOR SOLUTION ORAL 2 TIMES DAILY
Status: DISCONTINUED | OUTPATIENT
Start: 2020-04-19 | End: 2020-04-19 | Stop reason: HOSPADM

## 2020-04-19 RX ORDER — BUSPIRONE HYDROCHLORIDE 15 MG/1
15 TABLET ORAL 2 TIMES DAILY
Status: DISCONTINUED | OUTPATIENT
Start: 2020-04-19 | End: 2020-04-19 | Stop reason: HOSPADM

## 2020-04-19 RX ORDER — ONDANSETRON 8 MG/1
8 TABLET, ORALLY DISINTEGRATING ORAL EVERY 8 HOURS PRN
Qty: 15 TABLET | Refills: 0 | Status: SHIPPED | OUTPATIENT
Start: 2020-04-19 | End: 2021-01-05

## 2020-04-19 RX ORDER — ONDANSETRON 4 MG/1
8 TABLET, ORALLY DISINTEGRATING ORAL EVERY 8 HOURS PRN
Status: DISCONTINUED | OUTPATIENT
Start: 2020-04-19 | End: 2020-04-19 | Stop reason: HOSPADM

## 2020-04-19 RX ORDER — RIZATRIPTAN BENZOATE 5 MG/1
5-10 TABLET, ORALLY DISINTEGRATING ORAL
Status: DISCONTINUED | OUTPATIENT
Start: 2020-04-19 | End: 2020-04-19 | Stop reason: HOSPADM

## 2020-04-19 RX ORDER — AMOXICILLIN 250 MG
1 CAPSULE ORAL 2 TIMES DAILY PRN
Status: DISCONTINUED | OUTPATIENT
Start: 2020-04-19 | End: 2020-04-19 | Stop reason: HOSPADM

## 2020-04-19 RX ORDER — NALOXONE HYDROCHLORIDE 0.4 MG/ML
.1-.4 INJECTION, SOLUTION INTRAMUSCULAR; INTRAVENOUS; SUBCUTANEOUS
Status: DISCONTINUED | OUTPATIENT
Start: 2020-04-19 | End: 2020-04-19 | Stop reason: HOSPADM

## 2020-04-19 RX ORDER — LIDOCAINE 40 MG/G
CREAM TOPICAL
Status: DISCONTINUED | OUTPATIENT
Start: 2020-04-19 | End: 2020-04-19 | Stop reason: HOSPADM

## 2020-04-19 RX ORDER — SODIUM CHLORIDE 9 MG/ML
1000 INJECTION, SOLUTION INTRAVENOUS CONTINUOUS
Status: DISCONTINUED | OUTPATIENT
Start: 2020-04-19 | End: 2020-04-19 | Stop reason: HOSPADM

## 2020-04-19 RX ORDER — BUPROPION HYDROCHLORIDE 150 MG/1
150 TABLET ORAL EVERY MORNING
Status: DISCONTINUED | OUTPATIENT
Start: 2020-04-19 | End: 2020-04-19 | Stop reason: HOSPADM

## 2020-04-19 RX ORDER — ASPIRIN 81 MG/1
81 TABLET, CHEWABLE ORAL DAILY
Status: DISCONTINUED | OUTPATIENT
Start: 2020-04-19 | End: 2020-04-19 | Stop reason: HOSPADM

## 2020-04-19 RX ORDER — POLYETHYLENE GLYCOL 3350 17 G/17G
17 POWDER, FOR SOLUTION ORAL DAILY PRN
Status: DISCONTINUED | OUTPATIENT
Start: 2020-04-19 | End: 2020-04-19

## 2020-04-19 RX ADMIN — SODIUM CHLORIDE 1000 ML: 9 INJECTION, SOLUTION INTRAVENOUS at 07:10

## 2020-04-19 RX ADMIN — CLONAZEPAM 1 MG: 1 TABLET ORAL at 10:37

## 2020-04-19 RX ADMIN — DIPHENHYDRAMINE HYDROCHLORIDE 25 MG: 50 INJECTION, SOLUTION INTRAMUSCULAR; INTRAVENOUS at 03:29

## 2020-04-19 RX ADMIN — ASPIRIN 81 MG CHEWABLE TABLET 81 MG: 81 TABLET CHEWABLE at 10:38

## 2020-04-19 RX ADMIN — PANTOPRAZOLE SODIUM 40 MG: 40 TABLET, DELAYED RELEASE ORAL at 10:38

## 2020-04-19 RX ADMIN — SODIUM CHLORIDE 1000 ML: 9 INJECTION, SOLUTION INTRAVENOUS at 03:23

## 2020-04-19 RX ADMIN — DULOXETINE HYDROCHLORIDE 60 MG: 60 CAPSULE, DELAYED RELEASE ORAL at 10:38

## 2020-04-19 RX ADMIN — BUPROPION HYDROCHLORIDE 150 MG: 150 TABLET, FILM COATED, EXTENDED RELEASE ORAL at 10:38

## 2020-04-19 RX ADMIN — OXYCODONE HYDROCHLORIDE 5 MG: 5 TABLET ORAL at 00:59

## 2020-04-19 RX ADMIN — PROCHLORPERAZINE EDISYLATE 10 MG: 5 INJECTION INTRAMUSCULAR; INTRAVENOUS at 03:28

## 2020-04-19 RX ADMIN — METOPROLOL SUCCINATE 200 MG: 200 TABLET, EXTENDED RELEASE ORAL at 10:40

## 2020-04-19 RX ADMIN — ACETAMINOPHEN 975 MG: 325 TABLET, FILM COATED ORAL at 12:42

## 2020-04-19 RX ADMIN — ONDANSETRON 8 MG: 4 TABLET, ORALLY DISINTEGRATING ORAL at 10:37

## 2020-04-19 RX ADMIN — FEXOFENADINE HYDROCHLORIDE 180 MG: 180 TABLET, FILM COATED ORAL at 10:40

## 2020-04-19 RX ADMIN — HYDROMORPHONE HYDROCHLORIDE 2 MG: 2 TABLET ORAL at 07:11

## 2020-04-19 RX ADMIN — LOSARTAN POTASSIUM 100 MG: 50 TABLET, FILM COATED ORAL at 10:38

## 2020-04-19 RX ADMIN — BUSPIRONE HYDROCHLORIDE 15 MG: 15 TABLET ORAL at 10:39

## 2020-04-19 RX ADMIN — HYDROMORPHONE HYDROCHLORIDE 2 MG: 2 TABLET ORAL at 11:44

## 2020-04-19 ASSESSMENT — ENCOUNTER SYMPTOMS
COUGH: 0
FATIGUE: 1
SHORTNESS OF BREATH: 0
COUGH: 1
APPETITE CHANGE: 1
BLOOD IN STOOL: 1
DIZZINESS: 0
BACK PAIN: 0
VOMITING: 1
NAUSEA: 0
SORE THROAT: 0
RHINORRHEA: 0
WEAKNESS: 0
CHILLS: 0
HEADACHES: 0
DIARRHEA: 0
FEVER: 1
VOMITING: 0
DYSURIA: 0
RECTAL PAIN: 1
ABDOMINAL PAIN: 1
NAUSEA: 1

## 2020-04-19 NOTE — ED NOTES
"Patient refused phlebotomy for labs stating \"Didn't we just do that yesterday? I'm tired of being poked.\" This writer informed the patient that the doctor wanted to see if anything had changed from yesterday's labs given her continuing complaints. Patient still refused and stated \"I'm still in pain though.\" MD updated.  "

## 2020-04-19 NOTE — PROVIDER NOTIFICATION
Paged [phone]    Pt states little relief with current dilaudid dose- she is requesting an increase in dose.

## 2020-04-19 NOTE — PLAN OF CARE
Care assumed [id number] when pt discharged. A&O. VSS, on room air. Pt reports abdominal, rectal, and vaginal pain. Dilaudid and Tylenol given x1. Up ad geovany in room. Urinates spontaneously. 1 soft BM this shift, no blood per pt. Discharge instructions reviewed with pt. PIV removed. Pt walked off unit at 1345 to  prescriptions from discharge pharmacy and then meet her mom who picked her up. Pt belongings sent with pt.

## 2020-04-19 NOTE — PROVIDER NOTIFICATION
Paged [phone]    Pt's depo is overdue- she is wondering if she can get it while she is here.    Pt is going to wait and contact her PCP about the depo.

## 2020-04-19 NOTE — DISCHARGE SUMMARY
[facility] Discharge Summary- [facility]    Date of Admission:  4/18/2020  Date of Service: 4/19/2020  Date of Discharge:  4/19/2020  Discharging Attending Provider: RAMANDEEP Hastings DO  Discharge Team: [facility]    Discharge Diagnoses   # Anal fissure  # Chronic abdominal pain    Follow-ups Needed After Discharge   Follow up with PCP for hospital follow up  Follow up with Colorectal surgery and GI as outpatient for ongoing anal fissure and chronic abdominal pain     Hospital Course   Katy Islas was admitted on 4/18/2020. She is a 30 year old female with history of possible Crohn's disease not confirmed, renal disease, GERD, HTN, bipolar disorder, likely BPD, fibromyalgia and drug seeking behavior, as well as congenital hearing loss requiring ASL interprester and presented with fevers/fistula. The following problems were addressed during her hospitalization:     # Anal fissure  # Chronic abdominal pain  Patient has been followed by GI and CR surg outpatient. In a GI note from 1/22/2020, it is noted that multiple workups have been completed from GI standpoint without cause or explanation of her pain, including not definitively showing signs of Crohn's or inflammatory bowel disease. Suspected her symptoms were related to IBS-D and/or related to endometriosis. She had I&D of perianal abscess in the ED on 3/3/2020 and diagnosed with anal fissure on 3/10/2020. CT AP 3/20/2020 WNL. See HPI for full recent visit list. MRI completed 4/17 noted only to have anal fissure, no abscess. She was admitted and left AMA 4/17 and returned 4/18 with fever reported, but not documented in vitals, with labs unremarkable. Pt was seen by colorectal fellow who recommended continued outpatient follow up and post COVID procedure. Home medications were continued and aggressive bowel regimen to prevent constipation was ordered. Few tabs of oral dilaudid given to bridge to PCP  appt. Proctofoam, lidocaine cream continued. Colorectal to follow up within 2 weeks.      # Nausea, improved   Patient states she has had nausea with vomiting ongoing for the past 2 months associated with abdominal and rectal pain. Also noting decreased appetite. No emesis while here. PRN zofran given for outpt.         # Chronic conditions:  Chronic pain: medical cannabis  Depression with anxiety: abilify, wellbutrin, buspar, duloxetine, prn clonazepam  Hypertension: losartan, prazosin, metoprolol  GERD: pantoprazole   Migraines: prn triptan  Asthma/allergies: allegra    # Discharge Pain Plan:   - During her hospitalization, Katy experienced pain due to chronic abd pain.  The pain plan for discharge was discussed with Katy and the plan was created in a collaborative fashion.    - Opioids prescribed on discharge: 12 tab 2mg po dilaudid  - Duration of opioids after discharge: Per CDC opioid prescribing guidelines, a 3 day prescription of opioids was provided.  - Bowel regimen: miralax  - Pharmacologic adjuvants:  Acetaminophen  - Follow-up: with PCP and CR surg    Consultations This Hospital Stay   COLORECTAL SURGERY ADULT IP CONSULT    Code Status   Full Code     The patient was discussed with and seen by Dr. Hastings.     DO [facility]  [facility]   Pager:[phone]_  ___________________________________________________________________  Review of Systems:  Constitutional: Positive for appetite change. Negative for fever and chills.   HENT: Negative for congestion, rhinorrhea and sore throat.    Eyes: Negative for visual disturbance.   Respiratory: Negative for cough (chronic) and shortness of breath.    Cardiovascular: Negative for chest pain.   Gastrointestinal: Positive for abdominal pain, blood in stool, nausea, rectal pain and vomiting.   Genitourinary: Positive for vaginal discharge. Negative for dysuria, vaginal bleeding and vaginal pain.    Musculoskeletal: Negative for back pain.   Skin: Negative for rash.   Neurological: Negative for dizziness, weakness and headaches.   Physical Exam   Vital Signs: Temp: 98.6  F (37  C) Temp src: Oral BP: 121/69 Pulse: 87   Resp: 16 SpO2: 94 % O2 Device: None (Room air)    Weight: 188 lbs 0 oz    General: Alert and oriented, in no acute distress.  Skin: Warm and dry, no abnormalities noted.  Eyes: Extra-ocular muscles intact, pupils equal and reactive.  ENT: Speech and hearing baseline as pt is deaf. nasal passages open  CV: No cyanosis or pallor, warm and well perfused.  Respiratory: No respiratory distress, no accessory muscle use.  Neuro: Gait and station normal, comprehension intact. Gross and fine motor skills intact.   Abd: Tenderness diffusely in abdomen, baseline.   Psychiatric: Mood and affect appear normal.   Extremities: Warm, able to move all four extremities at will.      Significant Results and Procedures   Most Recent 3 CBC's:  Recent Labs   Lab Test 04/19/20  0320 04/18/20  0028 04/11/20  1823   WBC 6.1 7.4 8.4   HGB 12.8 13.3 13.9   MCV 99 100 98    240 309     Most Recent 3 BMP's:  Recent Labs   Lab Test 04/19/20  0320 04/18/20  0028 04/11/20  1823    140 139   POTASSIUM 4.0 3.9 3.5   CHLORIDE 107 111* 107   CO2 26 22 26   BUN 17 16 16   CR 0.88 0.87 0.85   ANIONGAP 6 8 6   SARANYA 9.0 8.7 8.9   GLC 86 86 86     Most Recent ESR & CRP:  Recent Labs   Lab Test 04/19/20  0320   SED 9   CRP <2.9   ,   Results for orders placed or performed during the hospital encounter of 04/17/20   MR Pelvis (Intrapelvic Organs) wo Contrast    Narrative    EXAM: MRI PELVIS WITHOUT CONTRAST   LOCATION: [facility]  DATE/TIME: 4/17/2020 11:28 PM      INDICATION: History of Crohn's disease. Bloody stool this evening. History of anal fistula.     COMPARISON: CT 3/20/2020.    TECHNIQUE: Routine MRI anal protocol including T1 in/out phase, diffusion, thin section high resolution T2  images.    FINDINGS:   PERIANAL TISSUES: Very tiny linear focus of high T2 signal along the posterior margin of the anus at 6 o'clock position consistent with a tiny fissure or fistula. This is seen, for example, on series 5, image 15. This measures approximately 8 mm in   length and 2 mm in diameter. Minimal surrounding fat stranding/inflammation but no significant associated fluid collection or abscess.    ADDITIONAL FINDINGS: Uterus is present. Dominant follicle right ovary measuring 1.5 cm. No ascites, fluid collections, or other significant findings in the visualized portion of the pelvis.      Impression    IMPRESSION:  1.  Tiny posterior anal fissure or fistula.    2.  No associated fluid collection or abscess.       *Note: Due to a large number of results and/or encounters for the requested time period, some results have not been displayed. A complete set of results can be found in Results Review.       Pending Results   These results will be followed up by PCP  Unresulted Labs Ordered in the Past 30 Days of this Admission     Date and Time Order Name Status Description    4/18/2020 0340 Urine Culture Aerobic Bacterial Preliminary     4/17/2020 2019 Blood culture Preliminary     4/17/2020 2019 Blood culture Preliminary              Primary Care Physician   Aura Rosario    Discharge Disposition   Discharged to home  Condition at discharge: Stable    Discharge Orders      Reason for your hospital stay    Abdominal pain and rectal fistula. Assessed by colorectal team and planning for outpatient follow up and symptomatic management until intervention is possible.     Adult [facility] Follow-up and recommended labs and tests    Follow up with primary care provider, Aura Rosario, within 7 days for hospital follow- up.    Colorectal team follow up within 2 weeks.       Appointments on [place] and/or [facility] (with [facility] or [facility] provider or service). Call [phone] if  you haven't heard regarding these appointments within 7 days of discharge.     Activity    Your activity upon discharge: activity as tolerated     Discharge Instructions    -Warm water baths or sitz baths. OK to use water alone or epsom salts   - Avoid constipation, given new Rx for Miralax.     Full Code     Diet    Follow this diet upon discharge:     Regular Diet Adult     Discharge Medications   Discharge Medication List as of 4/19/2020 12:55 PM      START taking these medications    Details   HYDROmorphone (DILAUDID) 2 MG tablet Take 1 tablet (2 mg) by mouth every 4 hours as needed for moderate to severe pain, Disp-12 tablet,R-0, E-Prescribe      polyethylene glycol (MIRALAX) 17 g packet Take 17 g by mouth 2 times daily, Disp-60 packet,R-0, E-Prescribe         CONTINUE these medications which have CHANGED    Details   gabapentin (NEURONTIN) 300 MG capsule Take 1 capsule (300 mg) by mouth 3 times daily, Disp-270 capsule,R-3, No Print Out      ondansetron (ZOFRAN-ODT) 8 MG ODT tab Take 1 tablet (8 mg) by mouth every 8 hours as needed for nausea, Disp-15 tablet,R-0, E-Prescribe         CONTINUE these medications which have NOT CHANGED    Details   acetaminophen (TYLENOL) 500 MG tablet Take 2 tablets (1,000 mg) by mouth 3 times daily as needed for mild pain, Disp-100 tablet, R-3, E-Prescribe      albuterol (PROAIR HFA/PROVENTIL HFA/VENTOLIN HFA) 108 (90 Base) MCG/ACT inhaler Inhale 2 puffs into the lungs every 6 hours as needed for shortness of breath / dyspnea or wheezing, Disp-3 Inhaler, R-3, E-PrescribePharmacy may dispense brand covered by insurance (Proair, or proventil or ventolin or generic albuterol inhaler)      albuterol (PROVENTIL) (2.5 MG/3ML) 0.083% neb solution Take 1 vial (2.5 mg) by nebulization every 6 hours as needed for shortness of breath / dyspnea or wheezing, Disp-50 vial, R-11, E-Prescribe      alum & mag hydroxide-simethicone (MAALOX ADVANCED MAX ST) 400-400-40 MG/5ML SUSP suspension Take 15  mLs by mouth every 4 hours as needed for indigestion (mix with lidocaine), Disp-355 mL, R-11, E-Prescribe      ARIPiprazole (ABILIFY) 15 MG tablet Take 1 tablet (15 mg) by mouth At Bedtime, Disp-90 tablet, R-3, E-Prescribe      aspirin (ASA) 81 MG tablet Take 1 tablet (81 mg) by mouth daily, Disp-90 tablet, R-3, E-Prescribe      azelastine (OPTIVAR) 0.05 % ophthalmic solution Place 1 drop into both eyes 2 times daily, Disp-6 mL, R-11, E-Prescribe      buPROPion (WELLBUTRIN XL) 150 MG 24 hr tablet Take 1 tablet (150 mg) by mouth every morning, Disp-30 tablet,R-1, E-Prescribe      busPIRone (BUSPAR) 15 MG tablet Take 1 tablet (15 mg) by mouth 2 times daily, Disp-60 tablet, R-5, E-Prescribe      clonazePAM (KLONOPIN) 1 MG tablet Take 1 tablet (1 mg) by mouth 2 times daily as needed for anxiety, Disp-28 tablet,R-0, E-Prescribe      cyclobenzaprine (FLEXERIL) 10 MG tablet Take 1 tablet (10 mg) by mouth 3 times daily as needed for muscle spasms or other (back pain), Disp-90 tablet,R-3, E-Prescribe      diphenhydrAMINE (BENADRYL) 50 MG capsule Take 1-2 capsules ( mg) by mouth every 6 hours as needed for itching, allergies or sleep, Disp-90 capsule, R-3, E-Prescribe      DULoxetine (CYMBALTA) 60 MG EC capsule Take 1 capsule (60 mg) by mouth daily, Disp-90 capsule, R-3, E-Prescribe      EPINEPHrine (ANY BX GENERIC EQUIV) 0.3 MG/0.3ML injection 2-pack Inject 0.3 mLs (0.3 mg) into the muscle once as needed for anaphylaxis, Disp-0.6 mL,R-3, E-Prescribe      fexofenadine (ALLEGRA) 180 MG tablet Take 1 tablet (180 mg) by mouth daily, Disp-90 tablet, R-3, E-Prescribe      lidocaine (XYLOCAINE) 2 % solution Swish and swallow 15 mLs in mouth every 4 hours as needed for other (GERD) ; Max 8 doses/24 hour period. Mix with 15 mLs Maalox or Mylanta., Disp-100 mL, R-3, E-Prescribe      losartan (COZAAR) 100 MG tablet Take 1 tablet (100 mg) by mouth daily, Disp-90 tablet, R-3, E-Prescribe      medical cannabis (Patient's own supply)  (The purpose of this order is to document that the patient reports taking medical cannabis.  This is not a prescription, and is not used to certify that the patient has a qualifying medical condition.), Historical      medroxyPROGESTERone (DEPO-PROVERA) 150 MG/ML IM injection Inject 1 mL (150 mg) into the muscle every 3 months, Disp-3 mL, R-3, Injection      methocarbamol (ROBAXIN) 750 MG tablet Take 1 tablet (750 mg) by mouth 3 times daily as needed for muscle spasms, Disp-60 tablet, R-3, E-Prescribe      metoprolol succinate ER (TOPROL-XL) 200 MG 24 hr tablet Take 1 tablet (200 mg) by mouth daily, Disp-90 tablet, R-1, E-Prescribe      NONFORMULARY Apply 30 mLs topically 4 times daily, Historical      pantoprazole (PROTONIX) 40 MG EC tablet Take 1 tablet (40 mg) by mouth 2 times daily Take 30-60 minutes before a meal., Disp-180 tablet, R-3, E-Prescribe      prazosin (MINIPRESS) 1 MG capsule Take 1 mg by mouth At Bedtime, R-5, Historical      prochlorperazine (COMPAZINE) 10 MG tablet Take 1 tablet (10 mg) by mouth every 6 hours as needed for nausea or vomiting, Disp-30 tablet,R-0, E-Prescribe      rizatriptan (MAXALT-MLT) 5 MG ODT Take 1-2 tablets (5-10 mg) by mouth at onset of headache for migraine May repeat in 2 hours. Max 6 tablets/24 hours., Disp-18 tablet, R-3, E-Prescribe      sucralfate (CARAFATE) 1 GM tablet Take 1 tablet (1 g) by mouth 4 times daily as needed (heartburn), Disp-360 tablet, R-3, E-Prescribe      valACYclovir (VALTREX) 1000 mg tablet 2 tabs at onset of mouth sores.  Repeat dose in 12 hours., Historical         STOP taking these medications       diazepam (VALIUM) 5 MG tablet Comments:   Reason for Stopping:             Allergies   Allergies   Allergen Reactions     Iohexol Hives     Other reaction(s): Hives  IV contrast; patient states she tolerates contrast if she gets benadryl before  IV contrast; patient states she tolerates contrast if she gets benadryl before     Ativan [Lorazepam] Hives      At the IV site     Baclofen      hives     Bees Hives, Swelling and Difficulty breathing     Caffeine      Contrast Dye      Hives,   Updated 5/10/2016 CT Contrast.     Iodine Hives     Methocarbamol Swelling     Metoclopramide      Other reaction(s): Tremors  LW Reaction: shaking/sweating     Midazolam      Other reaction(s): Agitation     Monosodium Glutamate      Morphine Other (See Comments)     Difficulty with urination     Nsaids Other (See Comments)     GI BLEED x2     Other (Do Not Use) Other (See Comments)     Xanaflex- pt becomes disoriented and loses bladder control     Reglan [Metoclopramide Hcl] Other (See Comments)     shaking     Soma [Carisoprodol] Visual Disturbance     Sleep walking     Tizanidine      Topamax Other (See Comments)     Topamax [Topiramate] Nausea     Tingling  GI/Vomit     Tramadol      Severe Headache, Seizure Risk     Tylenol W/Codeine [Acetaminophen-Codeine] Nausea and Itching     Tylenol 3     Versed Other (See Comments)     Zolmitriptan      Makes face feel like its twitching     Droperidol Anxiety     Flu Virus Vaccine Rash      Arm swelling

## 2020-04-19 NOTE — ED NOTES
Per provider, Dr. Alvarez, patient shows no new concerning symptoms for COVID and is ok to be taken off isolation precautions at this time.

## 2020-04-19 NOTE — PROGRESS NOTES
Time: 6378-6209    Reason for admit: rectal pain     Neuro: A&Ox4,  needed, using pen and paper to make needs known   Activity: SBA   Pain: c/o rectal pain, prn dilaudid available   Cardiac: WNL, denies chest pain   Respiratory: WNL on RA, denies SOB  GI/: Voiding without difficutly, + BS  Diet: Regular   Lines: R PIV SL   Skin: No deficits noted    Events: Pt seen by MD, orders were placed, I ordered breakfast for pt, colorectal to consult later today     Continue to monitor and follow POC.

## 2020-04-19 NOTE — H&P
"[facility] History and Physical        Date of Admission:  4/18/2020  Date of Service: 4/19/2020         HPI      Chief Complaint   Fevers, abscess    History is obtained from the patient, electronic health record and emergency department physician    History of Present Illness   Katy is a 30 year old female  who has a history of possible Crohn's disease, renal disease, GERD, HTN, bipolar disorder, likely BPD, fibromyalgia and drug seeking behavior, as well as congenital hearing loss requiring ASL interprester and presented to rectal bleeding.      Patient is seen by Colorectal outpatient. She most recently had a visit 2 days ago with concern for anal fissure/perianal abscess. She had I&D of perianal abscess in ER on 3/3/2020, and diagnosed on 3/10/2020 with anal fissure. Had CT AP 3/20/2020 which didn't show any obvious abnormalities. On 4/11/2020 presented to the ED, but left AMA. Anoscopy was performed at visit 2 days ago and revealed a small opening in the posterior midline just inside the anal verge. No undrained abscess was felt at that time. She was scheduled for outpatient MRI     She presented to the ED yesterday with worsened abdominal pain, bloody stool, nausea, and vomiting. She reported they were ongoing for the past 2 months, but worsened acutely. Reported fevers at home. ED workup was unremarkable at that time. MRI which had originally been scheduled outpatient was completed and showed an anal fissure, and possibly a small fistula without any drainable abscess. She was admitted to the hospital. She refused Toradol and requested morphine. She \"didn't believe\" the MRI results, and didn't feel her pain was controlled. She left AMA.      Today, she presents to the ED for continued fever and reported abscess. She reported a fever of 102F, and states she took 3 tylenol before presenting to the ED. Continues to complain of nausea and " vomiting.     ED workup:  - CBC: MCH 33.3, otherwise WNL  - BMP: WNL  - CRP: <2.9 (WNL)  - ESR:  9 (WNL)     List of recent visits to the ER, copied from ED note from 4/11, and updated.  3/3/2020: [facility] - perianal abscess with I&D at that time  3/4/2020 - [facility] - Dr. Alamo, abscess looked good, no additional plan of treatment  3/6/2020 - [facility] - Dr. Alamo - looks good,   3/9/2020: [facility] - fever - diagnosis adverse effect of antibiotic and diarrhea  3/10/2020: [facility] - perianal fissure -   3/15/2020: [facility] ED: perirectal abscess  3/19/2020: [facility]: UTI symptoms  3/20/2020: [facility] ED: abd pain; normal CT abd/pelvis; normal CRP and ESR, WBC normal  3/22/2020: [facility] ED: healing rectal abscess area / fissure?  3/23/2020: E-Visit: started on Klonopin and bupropion for depression   3/25/2020: [facility] - perianal fissure - given IV pain medications, had normal labs, and was sent home.  3/26 [facility] ER visit  3/29 [facility] ER visit. Labs stable. No abscess seen on exam. Pt requested Rx for opiates on discharge, was told ER cannot prescribe opiates for her chronic pain issue  4/1 [facility] ER visit, concern for drug seeking behavior and that pt needs to f/u with appropriate specialist, no abscess palpated during that visit. Afebrile  4/4 [facility] ER visit, noted to have cough productive of white sputum at that time, CXR read copied below. Again, she requested narcotic pain medications as well as zofran  4/11 [facility] ER visit, again complaint of anal abscess. Labs stable. No abscess palpated. Left AMA  4/13 [facility] ER, rectal pain. Labs stable, CT AP no acute findings. No perirectal air or fluid collection.   4/16 telephone visit with colorectal surgery. Set up for outpatient MRI to assess for fistula/abscess.   4/17 [facility] ER visit with bloody stool  and increased drainage from fistula, reported fevers. Normal labs. Admitted to the hospital, left AMA    Patient continuously presents stating she has had fevers, and has not had a measured temperature >99.6 during any of her visits within our system. She has had multiple normal CBC and CRPs.            History:   Review of Systems   The 10 point Review of Systems is negative other than noted in the HPI or here.   Review of Systems   Constitutional: Positive for appetite change and fever. Negative for chills.   HENT: Negative for congestion, rhinorrhea and sore throat.    Eyes: Negative for visual disturbance.   Respiratory: Negative for cough (chronic) and shortness of breath.    Cardiovascular: Negative for chest pain.   Gastrointestinal: Positive for abdominal pain, blood in stool, nausea, rectal pain and vomiting.   Genitourinary: Positive for vaginal discharge. Negative for dysuria, vaginal bleeding and vaginal pain.   Musculoskeletal: Negative for back pain.   Skin: Negative for rash.   Neurological: Negative for dizziness, weakness and headaches.       Past Medical History    I have reviewed this patient's medical history and updated it with pertinent information if needed.   Past Medical History:   Diagnosis Date     Abdominal pain 10/31- 11/4/2005    [facility] admit for [facility]     Allergic rhinitis, cause unspecified     Allergic rhinitis     Arthritis      C. difficile diarrhea     Past, no current diarrhea.     Crohn's disease (H)     sees Dr Summers or Ryan at [facility] in [place]     Crohn's disease (H)      Depression with anxiety 2003    Dr Bernard (psychiatry) at 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, without mention of intractable epilepsy     pseudoseizures diagnosed after extensive neurologic eval     Migraine 07/21/12    D/C 07/22/12-[facility]     Migraine, unspecified, without mention of intractable migraine without mention of status migrainosus     Migraine     Mild intermittent asthma     mild intermittent     Mycoplasma infection in conditions classified elsewhere and of unspecified site      Other chronic pain     Back pain for 6 years     Renal disease     Kidney stones     Unspecified hearing loss     congenital hearing loss        Past Surgical History   I have reviewed this patient's surgical history and updated it with pertinent information if needed.  Past Surgical History:   Procedure Laterality Date     AS REMOVAL OF COCCYX  10/18/2017     C FUSION OF SACROILIAC JOINT  10/26/2018     COLONOSCOPY  7/1/2009    [facility].     COLONOSCOPY N/A 7/17/2019    Procedure: Colonoscopy, With Polypectomy And Biopsy;  Surgeon: Edwin Conde MD;  Location: [facility] OR     COLONOSCOPY WITH CO2 INSUFFLATION N/A 7/17/2019    Procedure: COLONOSCOPY, WITH CO2 INSUFFLATION;  Surgeon: Edwin Conde MD;  Location: [facility] OR     COMBINED ESOPHAGOSCOPY, GASTROSCOPY, DUODENOSCOPY (EGD) WITH CO2 INSUFFLATION N/A 4/12/2019    Procedure: COMBINED ESOPHAGOSCOPY, GASTROSCOPY, DUODENOSCOPY (EGD) WITH CO2 INSUFFLATION;  Surgeon: Edwin Conde MD;  Location: [facility] OR     ESOPHAGOSCOPY, GASTROSCOPY, DUODENOSCOPY (EGD), COMBINED N/A 4/12/2019    Procedure: Combined Esophagoscopy, Gastroscopy, Duodenoscopy (Egd), Biopsy Single Or Multiple;  Surgeon: Edwin Conde MD;  Location: [facility] OR     FUSION SPINE POSTERIOR MINIMALLY INVASIVE ONE LEVEL N/A 2/23/2017    Procedure: FUSION SPINE POSTERIOR MINIMALLY INVASIVE ONE LEVEL;  L4-5 Oblique Lateral Lumbar Interbody Fusion   Epidural steroid injection.   Transpedicular Bone marrow aspiration;  Surgeon: Valorie  MD Jenifefr;  Location: [facility] OR     HC COLONOSCOPY THRU STOMA WITH BIOPSY  10/29/2003    Impression is that of normal appearing colonoscopy, without evidence of rectal bleeding.     HC COLONOSCOPY THRU STOMA, DIAGNOSTIC  10/00    normal     HC COLONOSCOPY THRU STOMA, DIAGNOSTIC  Oct 2009    Dr López- normal     HC EEG AWAKE AND SLEEP      abnormal     HC MRI BRAIN W/O CONTRAST  12/00    normal     HC REMOVAL GALLBLADDER  8/5/2009    [facility], [facility].     HC UGI ENDOSCOPY DIAG W BIOPSY  11/11/09    Normal esophagus     HC UGI ENDOSCOPY, SIMPLE EXAM  7/00, 10/00    mild chronic esophagitis and duodenitis, neg H pylori     HC UGI ENDOSCOPY, SIMPLE EXAM  01/20/2005    Esophagogastroduodenoscopy, colonoscopy with biopsies.  [facility] [place]     HC UGI ENDOSCOPY, SIMPLE EXAM  7/1/2009    [facility], [facility].      UGI ENDOSCOPY, SIMPLE EXAM  11/11/2009    attempted upper GI, pt. could not tolerate procedure:[facility]     ORTHOPEDIC SURGERY  October 19,2011    diskectomy L4-L5        Social History   Social History     Tobacco Use     Smoking status: Current Every Day Smoker     Packs/day: 0.25     Years: 5.00     Pack years: 1.25     Types: Cigarettes     Smokeless tobacco: Never Used   Substance Use Topics     Alcohol use: Not Currently     Comment: Not since last July 2018     Drug use: Not Currently     Types: Marijuana     Comment: Medical Marijuana currently-- More CBD       Family History   I have reviewed this patient's family history and updated it with pertinent information if needed.   Family History   Problem Relation Age of Onset     Gastrointestinal Disease Brother         severe Crohn's     Neurologic Disorder Brother         Seizures post head injury     Depression Brother      Substance Abuse Brother      Genitourinary Problems Father         kidney stones     Diabetes Father      Heart Disease Father         Open heart surgery     Breast Cancer Maternal  Grandmother      Parkinsonism Maternal Grandmother      Cerebrovascular Disease Paternal Grandmother      Cancer Maternal Grandfather         Lung     Cardiovascular Paternal Grandfather         Heart Attack     Substance Abuse Mother         in recovery x1 year      Lung Cancer Paternal Uncle      Cancer Paternal Uncle      Lung Cancer Maternal Uncle        Prior to Admission Medications   Prior to Admission Medications   Prescriptions Last Dose Informant Patient Reported? Taking?   ARIPiprazole (ABILIFY) 15 MG tablet   No No   Sig: Take 1 tablet (15 mg) by mouth At Bedtime   DULoxetine (CYMBALTA) 60 MG EC capsule   No No   Sig: Take 1 capsule (60 mg) by mouth daily   EPINEPHrine (ANY BX GENERIC EQUIV) 0.3 MG/0.3ML injection 2-pack   No No   Sig: Inject 0.3 mLs (0.3 mg) into the muscle once as needed for anaphylaxis   NONFORMULARY   Yes No   Sig: Apply 30 mLs topically 4 times daily   acetaminophen (TYLENOL) 500 MG tablet   No No   Sig: Take 2 tablets (1,000 mg) by mouth 3 times daily as needed for mild pain   albuterol (PROAIR HFA/PROVENTIL HFA/VENTOLIN HFA) 108 (90 Base) MCG/ACT inhaler   No No   Sig: Inhale 2 puffs into the lungs every 6 hours as needed for shortness of breath / dyspnea or wheezing   albuterol (PROVENTIL) (2.5 MG/3ML) 0.083% neb solution   No No   Sig: Take 1 vial (2.5 mg) by nebulization every 6 hours as needed for shortness of breath / dyspnea or wheezing   alum & mag hydroxide-simethicone (MAALOX ADVANCED MAX ST) 400-400-40 MG/5ML SUSP suspension   No No   Sig: Take 15 mLs by mouth every 4 hours as needed for indigestion (mix with lidocaine)   aspirin (ASA) 81 MG tablet   No No   Sig: Take 1 tablet (81 mg) by mouth daily   azelastine (OPTIVAR) 0.05 % ophthalmic solution   No No   Sig: Place 1 drop into both eyes 2 times daily   buPROPion (WELLBUTRIN XL) 150 MG 24 hr tablet   No No   Sig: Take 1 tablet (150 mg) by mouth every morning   busPIRone (BUSPAR) 15 MG tablet   No No   Sig: Take 1  tablet (15 mg) by mouth 2 times daily   clonazePAM (KLONOPIN) 1 MG tablet   No No   Sig: Take 1 tablet (1 mg) by mouth 2 times daily as needed for anxiety   cyclobenzaprine (FLEXERIL) 10 MG tablet   No No   Sig: Take 1 tablet (10 mg) by mouth 3 times daily as needed for muscle spasms or other (back pain)   diazepam (VALIUM) 5 MG tablet   No No   Sig: Take 1 tablet (5 mg) by mouth once for 1 dose   diphenhydrAMINE (BENADRYL) 50 MG capsule   No No   Sig: Take 1-2 capsules ( mg) by mouth every 6 hours as needed for itching, allergies or sleep   fexofenadine (ALLEGRA) 180 MG tablet   No No   Sig: Take 1 tablet (180 mg) by mouth daily   gabapentin (NEURONTIN) 300 MG capsule   No No   Sig: Take 1-3 capsules (300-900 mg) by mouth 3 times daily as needed (back pain)   lidocaine (XYLOCAINE) 2 % solution   No No   Sig: Swish and swallow 15 mLs in mouth every 4 hours as needed for other (GERD) ; Max 8 doses/24 hour period. Mix with 15 mLs Maalox or Mylanta.   losartan (COZAAR) 100 MG tablet   No No   Sig: Take 1 tablet (100 mg) by mouth daily   medical cannabis (Patient's own supply)   Yes No   Sig: (The purpose of this order is to document that the patient reports taking medical cannabis.  This is not a prescription, and is not used to certify that the patient has a qualifying medical condition.)   medroxyPROGESTERone (DEPO-PROVERA) 150 MG/ML IM injection   No No   Sig: Inject 1 mL (150 mg) into the muscle every 3 months   methocarbamol (ROBAXIN) 750 MG tablet   No No   Sig: Take 1 tablet (750 mg) by mouth 3 times daily as needed for muscle spasms   metoprolol succinate ER (TOPROL-XL) 200 MG 24 hr tablet   No No   Sig: Take 1 tablet (200 mg) by mouth daily   ondansetron (ZOFRAN ODT) 4 MG ODT tab   No No   Sig: Take 2 tablets (8 mg) by mouth every 6 hours as needed for nausea or vomiting   ondansetron (ZOFRAN ODT) 8 MG ODT tab   No No   Sig: Take 1 tablet (8 mg) by mouth every 8 hours as needed for nausea   pantoprazole  (PROTONIX) 40 MG EC tablet   No No   Sig: Take 1 tablet (40 mg) by mouth 2 times daily Take 30-60 minutes before a meal.   prazosin (MINIPRESS) 1 MG capsule   Yes No   Sig: Take 1 mg by mouth At Bedtime   prochlorperazine (COMPAZINE) 10 MG tablet   No No   Sig: Take 1 tablet (10 mg) by mouth every 6 hours as needed for nausea or vomiting   rizatriptan (MAXALT-MLT) 5 MG ODT   No No   Sig: Take 1-2 tablets (5-10 mg) by mouth at onset of headache for migraine May repeat in 2 hours. Max 6 tablets/24 hours.   sucralfate (CARAFATE) 1 GM tablet   No No   Sig: Take 1 tablet (1 g) by mouth 4 times daily as needed (heartburn)   valACYclovir (VALTREX) 1000 mg tablet   Yes No   Si tabs at onset of mouth sores.  Repeat dose in 12 hours.      Facility-Administered Medications Last Administration Doses Remaining   diazepam (VALIUM) tablet 5 mg None recorded 1   medroxyPROGESTERone (DEPO-PROVERA) injection 150 mg 2019 11:31 AM 3   medroxyPROGESTERone (DEPO-PROVERA) injection 150 mg 2020  9:09 AM 3   medroxyPROGESTERone (DEPO-PROVERA) syringe 150 mg None recorded 4        Allergies   Allergies   Allergen Reactions     Iohexol Hives     Other reaction(s): Hives  IV contrast; patient states she tolerates contrast if she gets benadryl before  IV contrast; patient states she tolerates contrast if she gets benadryl before     Ativan [Lorazepam] Hives     At the IV site     Baclofen      hives     Bees Hives, Swelling and Difficulty breathing     Caffeine      Contrast Dye      Hives,   Updated 5/10/2016 CT Contrast.     Iodine Hives     Methocarbamol Swelling     Metoclopramide      Other reaction(s): Tremors  LW Reaction: shaking/sweating     Midazolam      Other reaction(s): Agitation     Monosodium Glutamate      Morphine Other (See Comments)     Difficulty with urination     Nsaids Other (See Comments)     GI BLEED x2     Other (Do Not Use) Other (See Comments)     Xanaflex- pt becomes disoriented and loses bladder  control     Reglan [Metoclopramide Hcl] Other (See Comments)     shaking     Soma [Carisoprodol] Visual Disturbance     Sleep walking     Tizanidine      Topamax Other (See Comments)     Topamax [Topiramate] Nausea     Tingling  GI/Vomit     Tramadol      Severe Headache, Seizure Risk     Tylenol W/Codeine [Acetaminophen-Codeine] Nausea and Itching     Tylenol 3     Versed Other (See Comments)     Zolmitriptan      Makes face feel like its twitching     Droperidol Anxiety     Flu Virus Vaccine Rash      Arm swelling           Physical Exam      Vital Signs: Temp: 99.6  F (37.6  C) Temp src: Oral BP: 117/76 Pulse: 89   Resp: 18 SpO2: 99 % O2 Device: None (Room air)    Weight: 188 lbs 0 oz    Physical Exam  Constitutional:       Appearance: Normal appearance.      Comments: Uncomfortable appearing   HENT:      Head: Normocephalic and atraumatic.      Right Ear: Decreased hearing noted.      Left Ear: Decreased hearing noted.      Ears:      Comments: Wears hearing aid in right ear.      Mouth/Throat:      Pharynx: Oropharynx is clear.      Comments: Tachy mucous membranes  Eyes:      General: No scleral icterus.     Extraocular Movements: Extraocular movements intact.      Conjunctiva/sclera: Conjunctivae normal.   Neck:      Musculoskeletal: Normal range of motion.   Cardiovascular:      Rate and Rhythm: Normal rate and regular rhythm.      Heart sounds: Normal heart sounds.   Pulmonary:      Effort: Pulmonary effort is normal.      Breath sounds: Normal breath sounds. No wheezing, rhonchi or rales.   Abdominal:      General: Abdomen is flat. Bowel sounds are normal.      Palpations: Abdomen is soft.      Tenderness: There is abdominal tenderness (diffusely) in the right lower quadrant, suprapubic area and left lower quadrant.   Musculoskeletal: Normal range of motion.      Right lower leg: No edema.      Left lower leg: No edema.   Skin:     General: Skin is warm and dry.      Capillary Refill: Capillary refill  takes 2 to 3 seconds.   Neurological:      General: No focal deficit present.      Mental Status: She is alert and oriented to person, place, and time.         Assessment & Plan      Katy is a 30 year old female admitted on 4/18/2020. Katy is a 30 year old female  who has a history of possible Crohn's disease, renal disease, GERD, HTN, bipolar disorder, likely BPD, fibromyalgia and drug seeking behavior, as well as congenital hearing loss requiring ASL interprester and presented to rectal bleeding.     Katy is a 30 year old female admitted on 4/17/2020. She has a history of possible Crohn's disease, renal disease, GERD, HTN, bipolar disorder, likely BPD, fibromyalgia and drug seeking behavior, as well as congenital hearing loss requiring ASL interprester and presented with fevers/fistula.      # Anal fissure  # Chronic abdominal pain  # IBS  Patient has been followed by GI and CR surg outpatient. In a GI note from 1/22/2020, it is noted that multiple workups have been completed from GI standpoint without cause or explanation of her pain, including not definitively showing signs of Crohn's or inflammatory bowel disease. Suspected her symptoms were related to IBS-D and/or related to endometriosis. She had I&D of perianal abscess in the ED on 3/3/2020 and diagnosed with anal fissure on 3/10/2020. CT AP 3/20/2020 WNL. See HPI for full recent visit list. MRI completed yesterday in ED, noted only to have anal fissure, no abscess. She was admitted and left AMA. She returned today and continues to be recorded as afebrile here, with labs unremarkable. Patient's pain appears to be congruent with chronic abdominal pain. All labs, including inflammatory markers, are normal. Consistent with prior ED visits concerning for pain seeking behavior.   - Plan for pain control with topicals, home medications.              - Proctofoam, lidocaine cream              - oral dilaudid PRN   - Avoiding antifebrile medications  - Colorectal  consult this morning for evaluation and management     # Nausea  Patient states she has had nausea with vomiting ongoing for the past 2 months associated with abdominal and rectal pain. Also noting decreased appetite.   - prn zofran      # Chronic conditions:  Chronic pain: medical cannabis  Depression with anxiety: abilify, wellbutrin, buspar, duloxetine, prn clonazepam  Hypertension: losartan, prazosin, metoprolol  GERD: pantoprazole   Migraines: prn triptan  Asthma/allergies: allegra    # Pain Assessment:  Current Pain Score 4/19/2020   Patient currently in pain? yes   Pain score (0-10) -   Pain location -   Pain descriptors Discomfort   - Katy is experiencing pain due to rectal and abdominal pain. Pain management was discussed and the plan was created in a collaborative fashion.  Katy's response to the current recommendations: engaged  - Please see the plan for pain management as documented above        Diet: Regular Diet Adult  Fluids: Po  DVT Prophylaxis: Pneumatic Compression Devices  Code Status: Full Code    Disposition Plan   Expected discharge: Today; recommended to prior living arrangement once consults and outpatient follow up plan. Dispo:          Entered: Angélica Rios 04/19/2020, 5:29 AM   Information in the above section will display in the discharge planner report.    Discussed with faculty but not examined by faculty.    Angélica Rios MD PGY-1    [facility] Medicine Inpatient Service  [facility]   Pager: [phone]  Please see sticky note for cross cover information      Results:     Data   Recent Labs   Lab 04/19/20  0320 04/18/20  0028   WBC 6.1 7.4   HGB 12.8 13.3   MCV 99 100    240    140   POTASSIUM 4.0 3.9   CHLORIDE 107 111*   CO2 26 22   BUN 17 16   CR 0.88 0.87   ANIONGAP 6 8   SARANYA 9.0 8.7   GLC 86 86   ALBUMIN  --  3.8   PROTTOTAL  --  7.2   BILITOTAL  --  0.6   ALKPHOS  --  99   ALT  --  39   AST  --  50*     Most Recent 3 CBC's:  Recent Labs    Lab Test 04/19/20  0320 04/18/20  0028 04/11/20  1823   WBC 6.1 7.4 8.4   HGB 12.8 13.3 13.9   MCV 99 100 98    240 309     Most Recent 3 BMP's:  Recent Labs   Lab Test 04/19/20  0320 04/18/20  0028 04/11/20  1823    140 139   POTASSIUM 4.0 3.9 3.5   CHLORIDE 107 111* 107   CO2 26 22 26   BUN 17 16 16   CR 0.88 0.87 0.85   ANIONGAP 6 8 6   SARANYA 9.0 8.7 8.9   GLC 86 86 86     Most Recent 2 LFT's:  Recent Labs   Lab Test 04/18/20  0028 04/11/20  1823   AST 50* 29   ALT 39 59*   ALKPHOS 99 98   BILITOTAL 0.6 0.2     Most Recent ESR & CRP:  Recent Labs   Lab Test 04/19/20  0320   SED 9   CRP <2.9     No results found for this or any previous visit (from the past 24 hour(s)).

## 2020-04-19 NOTE — ED NOTES
Dr. Alvarez spoke to the patient about labs and medications. The patient agreed to have an IV placed.

## 2020-04-19 NOTE — ED NOTES
Attempted to place peripheral IV per bedside RN. Pt unable to tolerate IV access, yelling and pulling away on IV insertion, immediately demanding needle be removed via . Access attempt was stopped at that time. Primary RN notified.

## 2020-04-19 NOTE — ED NOTES
"This writer attempted to insert a PIV without success. Patient requesting an ultrasound insertion. Went into room with ultrasound and patient requested that the IV be placed \"only into the left hand.\"  Patient informed that while we'll try to accommodate that request and look there first, the IV ultimately will be placed in the best and safest vein. Patient asked if the \"IV was necessary\" and was told that that there were IV fluids and IV medications ordered to administer to her. Patient then refused IV stating \"I'm over this already.\"  "

## 2020-04-19 NOTE — CONSULTS
[facility]  Colon and Rectal Surgery Consult Note  Name: Katy Islas    MRN: [id number]  YOB: 1990    Age: 30 year old  Date of admission: 4/18/2020  Primary care provider: Aura Rosario           History of Present Illness:   Katy Islas is a very pleasant 30 year old female with congenital hearing loss (need ), Crohn's disease, renal disease, GERD, C.difficile, HTN, bipolar disorder, likely borderline personality disorder, fibromyalgia and drug seeking behavior who presents today for abdominal pain and fevers.  She has ongoing perianal pain which we have seen her several times for in the past.  She was most recently seen in our clinic on April 16.  An outpatient MRI was ordered.  This was completed inpatient on this day.  I have reviewed the imaging.  It shows a very small fissure/fistula in the posterior midline.  There is no evidence of undrained perianal sepsis.    In discussion with the patient, she states that her pain is exactly the same as when she was seen in clinic on April 16.  She continues to have a small amount of drainage and intermittent pain.  The pain is not severe.  She states that her abdominal pain and fevers are what brought her to the emergency room.  Please see the clinic note dated April 16 for full details of her history.    A video based  was used to complete the interview.            Past Medical History:     Past Medical History:   Diagnosis Date     Abdominal pain 10/31- 11/4/2005    [facility] admit for Crohn's     Allergic rhinitis, cause unspecified     Allergic rhinitis     Arthritis      C. difficile diarrhea     Past, no current diarrhea.     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, without mention of intractable epilepsy     pseudoseizures diagnosed after extensive neurologic eval     Migraine 07/21/12    D/C 07/22/12-[facility]     Migraine, unspecified, without mention of intractable migraine without mention of status migrainosus     Migraine     Mild intermittent asthma     mild intermittent     Mycoplasma infection in conditions classified elsewhere and of unspecified site      Other chronic pain     Back pain for 6 years     Renal disease     Kidney stones     Unspecified hearing loss     congenital hearing loss             Past Surgical History:     Past Surgical History:   Procedure Laterality Date     AS REMOVAL OF COCCYX  10/18/2017     C FUSION OF SACROILIAC JOINT  10/26/2018     COLONOSCOPY  7/1/2009    [facility].     COLONOSCOPY N/A 7/17/2019    Procedure: Colonoscopy, With Polypectomy And Biopsy;  Surgeon: Edwin Conde MD;  Location: [facility] OR     COLONOSCOPY WITH CO2 INSUFFLATION N/A 7/17/2019    Procedure: COLONOSCOPY, WITH CO2 INSUFFLATION;  Surgeon: Edwin Conde MD;  Location: [facility] OR     COMBINED ESOPHAGOSCOPY, GASTROSCOPY, DUODENOSCOPY (EGD) WITH CO2 INSUFFLATION N/A 4/12/2019    Procedure: COMBINED ESOPHAGOSCOPY, GASTROSCOPY, DUODENOSCOPY (EGD) WITH CO2 INSUFFLATION;  Surgeon: Edwin Conde MD;  Location: [facility] OR     ESOPHAGOSCOPY, GASTROSCOPY, DUODENOSCOPY (EGD), COMBINED N/A 4/12/2019    Procedure: Combined Esophagoscopy, Gastroscopy, Duodenoscopy (Egd), Biopsy Single Or Multiple;  Surgeon: Edwin Conde MD;  Location: [facility] OR     FUSION SPINE POSTERIOR MINIMALLY INVASIVE ONE LEVEL N/A 2/23/2017    Procedure: FUSION SPINE POSTERIOR  MINIMALLY INVASIVE ONE LEVEL;  L4-5 Oblique Lateral Lumbar Interbody Fusion   Epidural steroid injection.   Transpedicular Bone marrow aspiration;  Surgeon: Jeniffer Eugene MD;  Location: [facility] OR     HC COLONOSCOPY THRU STOMA WITH BIOPSY  10/29/2003    Impression is that of normal appearing colonoscopy, without evidence of rectal bleeding.     HC COLONOSCOPY THRU STOMA, DIAGNOSTIC  10/00    normal     HC COLONOSCOPY THRU STOMA, DIAGNOSTIC  Oct 2009    Dr López- normal     HC EEG AWAKE AND SLEEP      abnormal     HC MRI BRAIN W/O CONTRAST  12/00    normal     HC REMOVAL GALLBLADDER  8/5/2009    [facility] [place].     HC UGI ENDOSCOPY DIAG W BIOPSY  11/11/09    Normal esophagus     HC UGI ENDOSCOPY, SIMPLE EXAM  7/00, 10/00    mild chronic esophagitis and duodenitis, neg H pylori     HC UGI ENDOSCOPY, SIMPLE EXAM  01/20/2005    Esophagogastroduodenoscopy, colonoscopy with biopsies.  [facility] UGI ENDOSCOPY, SIMPLE EXAM  7/1/2009    [facility], [facility].      UGI ENDOSCOPY, SIMPLE EXAM  11/11/2009    attempted upper GI, pt. could not tolerate procedure:[facility]     ORTHOPEDIC SURGERY  October 19,2011    diskectomy L4-L5               Social History:     Social History     Tobacco Use     Smoking status: Current Every Day Smoker     Packs/day: 0.25     Years: 5.00     Pack years: 1.25     Types: Cigarettes     Smokeless tobacco: Never Used   Substance Use Topics     Alcohol use: Not Currently     Comment: Not since last July 2018             Family History:     Family History   Problem Relation Age of Onset     Gastrointestinal Disease Brother         severe Crohn's     Neurologic Disorder Brother         Seizures post head injury     Depression Brother      Substance Abuse Brother      Genitourinary Problems Father         kidney stones     Diabetes Father      Heart Disease Father         Open heart surgery     Breast Cancer Maternal Grandmother       Parkinsonism Maternal Grandmother      Cerebrovascular Disease Paternal Grandmother      Cancer Maternal Grandfather         Lung     Cardiovascular Paternal Grandfather         Heart Attack     Substance Abuse Mother         in recovery x1 year      Lung Cancer Paternal Uncle      Cancer Paternal Uncle      Lung Cancer Maternal Uncle              Allergies:     Allergies   Allergen Reactions     Iohexol Hives     Other reaction(s): Hives  IV contrast; patient states she tolerates contrast if she gets benadryl before  IV contrast; patient states she tolerates contrast if she gets benadryl before     Ativan [Lorazepam] Hives     At the IV site     Baclofen      hives     Bees Hives, Swelling and Difficulty breathing     Caffeine      Contrast Dye      Hives,   Updated 5/10/2016 CT Contrast.     Iodine Hives     Methocarbamol Swelling     Metoclopramide      Other reaction(s): Tremors  LW Reaction: shaking/sweating     Midazolam      Other reaction(s): Agitation     Monosodium Glutamate      Morphine Other (See Comments)     Difficulty with urination     Nsaids Other (See Comments)     GI BLEED x2     Other (Do Not Use) Other (See Comments)     Xanaflex- pt becomes disoriented and loses bladder control     Reglan [Metoclopramide Hcl] Other (See Comments)     shaking     Soma [Carisoprodol] Visual Disturbance     Sleep walking     Tizanidine      Topamax Other (See Comments)     Topamax [Topiramate] Nausea     Tingling  GI/Vomit     Tramadol      Severe Headache, Seizure Risk     Tylenol W/Codeine [Acetaminophen-Codeine] Nausea and Itching     Tylenol 3     Versed Other (See Comments)     Zolmitriptan      Makes face feel like its twitching     Droperidol Anxiety     Flu Virus Vaccine Rash      Arm swelling             Medications:       ARIPiprazole  15 mg Oral At Bedtime     aspirin  81 mg Oral Daily     buPROPion  150 mg Oral QAM     busPIRone  15 mg Oral BID     DULoxetine  60 mg Oral Daily     fexofenadine  180 mg  "Oral Daily     gabapentin  300 mg Oral TID     losartan  100 mg Oral Daily     metoprolol succinate ER  200 mg Oral Daily     pantoprazole  40 mg Oral BID     prazosin  1 mg Oral At Bedtime             Review of Systems:   A comprehensive greater than 10 system review of systems was carried out.  Pertinent positives and negatives are noted above.  Otherwise negative for contributory info.            Physical Exam:     Blood pressure 121/69, pulse 87, temperature 98.6  F (37  C), temperature source Oral, resp. rate 16, height 5' 2\", weight 188 lb, SpO2 94 %, not currently breastfeeding.  No intake or output data in the 24 hours ending 04/19/20 1023    General - Awake alert and oriented, appears stated age  Pulm - Non-labored breathing with normal respiratory effort  CVS - reg rate and rhythm, no peripheral edema  Abd -  No guarding, rigidity or peritoneal signs.   Rectal exam was deferred as patient was just examined in our clinic on 4/16. Please see documented colorectal exam in that note  Neuro - CN II-XII grossly intact  Musculoskeletal - extremities with no clubbing, cyanosis or edema  Psych - responsive, alert, cooperative; oriented x3; appropriate mood and affect  External/skin - inspection reveals no rashes         Data Reviewed:   No results found for this or any previous visit (from the past 24 hour(s)).    Recent Labs   Lab 04/19/20  0320 04/18/20  0028   WBC 6.1 7.4   HGB 12.8 13.3   HCT 37.9 40.3   MCV 99 100    240         Assessment and Plan:   Katy Islas is a 30 year old female who presented with abdominal pain and fevers. We were consulted for her perianal pain.  She was recently seen in our clinic on April 16 for this same problem.  Please see the clinic note for full details of her history and our detailed recommendations.  MRI was completed which shows no undrained fluid collection.  She has a very small fistula.  I do not believe that this fistula is related to her abdominal pain or " fevers.  No urgent surgical intervention is necessary.  Could consider fistulotomy versus seton placement as an outpatient.  However, this is an elective surgery and due to COVID-19 restrictions would plan to manage symptomatically at this time.    -Warm water baths  -Avoid constipation  -Tylenol as needed for pain control  -Outpatient colorectal follow up already arranged  -Colorectal surgery will sign off      Robert Vernon MD  Colon and Rectal Surgery Fellow  Pager: [phone]

## 2020-04-19 NOTE — ED TRIAGE NOTES
"COVID Wellness Screening: Positive for fever and cough  Patient presents to triage with worsening fever, nausea, stomach pain and concern for \"pus draining out of a bump by the tailbone.\" States her fever was 102.1 at home and took 3 tylenol. Temp 99.6 in triage. Patient was here yesterday and was told her MRI results were negative by the MD, but found her mychart report to state there was concern for a small fistula. Triage assessment done with  via iPad.    "

## 2020-04-19 NOTE — TELEPHONE ENCOUNTER
is calling for patient. Patient is complaining of a fever and pus seeping from rectum area. Caller states she is having rectal pain and rates pain 4/10. Caller states she can feel something at the opening of her rectum area. Triage guidelines recommend to see provider within 4 hours. Caller refused disposition and states she will go in the am.   COVID 19 Nurse Triage Plan/Patient Instructions    Please be aware that novel coronavirus (COVID-19) may be circulating in the community. If you develop symptoms such as fever, cough, or SOB or if you have concerns about the presence of another infection including coronavirus (COVID-19), please contact your health care provider or visit [email or web address].     Disposition/Instructions    Patient to go to ED and follow protocol based instructions. Follow System Ambulatory Workflow for COVID 19.     Bring Your Own Device:  Please also bring your smart device(s) (smart phones, tablets, laptops) and their charging cables for your personal use and to communicate with your care team during your visit.    Thank you for limiting contact with others, wearing a simple mask to cover your cough, practice good hand hygiene habits and accessing our virtual services where possible to limit the spread of this virus.    For more information about COVID19 and options for caring for yourself at home, please visit the CDC website at https://www.cdc.gov/coronavirus/2019-ncov/about/steps-when-sick.html  For more options for care at [facility], please visit our website at [email or web address]    For more information, please use the [place] Department of Health ([facility]) COVID-19 Hotlines (Interpreters available):     Health questions: Phone Number: [phone] or [phone] and Hours: 7 a.m. to 7 p.m.    Schools and  questions: Phone Number: [phone] or [phone] and Hours 7 a.m. to 7 p.m.                  Reason for  Disposition    [1] Rectal pain or redness AND [2] fever    Additional Information    Negative: Foreign body in rectum    Negative: Diarrhea is main symptom    Negative: Constipation is main symptom (e.g., pain or discomfort caused by passage of hard BMs)    Negative: Blood in or on bowel movement is main symptom    Negative: Pregnant    Negative: Pinworms are suspected (rectal itching; (white thread-like worm about size of a staple, moves)    Negative: Sexual assault    Negative: Injury to rectum    Negative: Large mass protruding out of rectum    Negative: Patient sounds very sick or weak to the triager    Negative: SEVERE rectal pain (e.g., excruciating, unable to have a bowel movement)    Negative: [1] Sudden onset rectal pain AND [2] constipated (straining, rectal pressure or fullness) AND [3] NOT better after SITZ bath, suppository or enema    Protocols used: RECTAL SYMPTOMS-A-

## 2020-04-19 NOTE — PROVIDER NOTIFICATION
Paged [phone]    Pt wondering if she should use her sitz bath at home with warm water or if she can add product to clean area.

## 2020-04-19 NOTE — ED PROVIDER NOTES
[facility] DE[facility] ([facility])  April 18, 2020  History   No chief complaint on file.    HPI  Katy Islas is a 30 year old female with a history of congenital hearing loss (requires ASL interpretor), Crohn's disease, GERD, HTN, C. Difficile diarrhea, renal disease, bipolar disorder, fibromyalgia, and drug seeking behavior, being followed by colorectal surgery for a recent perianal abscess (I&D'd 3/3/20) anal fissure, and concern for possible anal fistula who presents to the Emergency Department with abscess and fever. She says that she had a fever of 102F today at home and took 3 tylenol before coming to the ER.  Pt also notes increased drainage from the fistula and feel like something needs to be drained. Pt say that she has been having nausea and vomiting x1 today.  Pt notes chronic cough but no new cough.  Pt smokes tobacco.      Per chart review, patient was seen in the ED yesterday, 4/17/2020, with worsening abdominal pain, bloody stool, nausea, and vomiting. Patient reported rectal burning that has been present throughout the course of her recent evaluations in multiple EDs over the past month. She stated the rectal bleeding is new. She reported 2 bright red bloody BMs that day as well as dysuria, nausea, and vomiting. Also noted clear vaginal discharge and fever of 100.8. Patient was admitted to medicine for pain control and IVF. During admission, patient continued to complain of pain. She refused Toradol, stating she wanted morphine or she wanted to leave. Patient was told the team would not be prescribing IV opioids, this made her increasingly angry. Patient stated she did not believe her diagnosis and MRI results and stated she wasn't being helped. Patient left AMA. Patient was seen in colorectal surgery office on 4/16. Plan was to obtain nonemergent outpatient MRI of the pelvis to evaluate for a perianal fistula.        MRI PELVIS WITHOUT CONTRAST 4/17/2020  IMPRESSION:  1.   Tiny posterior anal fissure or fistula.  2.  No associated fluid collection or abscess.    PAST MEDICAL HISTORY:   Past Medical History:   Diagnosis Date     Abdominal pain 10/31- 11/4/2005    [facility] admit [facility]     Allergic rhinitis, cause unspecified     Allergic rhinitis     Arthritis      C. difficile diarrhea     Past, no current diarrhea.     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, without mention of intractable epilepsy     pseudoseizures diagnosed after extensive neurologic eval     Migraine 07/21/12    D/C 07/22/12-[facility]     Migraine, unspecified, without mention of intractable migraine without mention of status migrainosus     Migraine     Mild intermittent asthma     mild intermittent     Mycoplasma infection in conditions classified elsewhere and of unspecified site      Other chronic pain     Back pain for 6 years     Renal disease     Kidney stones     Unspecified hearing loss     congenital hearing loss       PAST SURGICAL HISTORY:   Past Surgical History:   Procedure Laterality Date     AS REMOVAL OF COCCYX  10/18/2017     C FUSION OF SACROILIAC JOINT  10/26/2018     COLONOSCOPY  7/1/2009    [facility].     COLONOSCOPY N/A 7/17/2019    Procedure: Colonoscopy, With Polypectomy And Biopsy;  Surgeon: Edwin Conde MD;  Location: [facility] OR     COLONOSCOPY WITH CO2 INSUFFLATION N/A 7/17/2019    Procedure: COLONOSCOPY, WITH CO2 INSUFFLATION;  Surgeon: Edwin Conde MD;  Location:  OR      COMBINED ESOPHAGOSCOPY, GASTROSCOPY, DUODENOSCOPY (EGD) WITH CO2 INSUFFLATION N/A 4/12/2019    Procedure: COMBINED ESOPHAGOSCOPY, GASTROSCOPY, DUODENOSCOPY (EGD) WITH CO2 INSUFFLATION;  Surgeon: Edwin Conde MD;  Location: [facility] OR     ESOPHAGOSCOPY, GASTROSCOPY, DUODENOSCOPY (EGD), COMBINED N/A 4/12/2019    Procedure: Combined Esophagoscopy, Gastroscopy, Duodenoscopy (Egd), Biopsy Single Or Multiple;  Surgeon: Edwin Conde MD;  Location: [facility] OR     FUSION SPINE POSTERIOR MINIMALLY INVASIVE ONE LEVEL N/A 2/23/2017    Procedure: FUSION SPINE POSTERIOR MINIMALLY INVASIVE ONE LEVEL;  L4-5 Oblique Lateral Lumbar Interbody Fusion   Epidural steroid injection.   Transpedicular Bone marrow aspiration;  Surgeon: Jeniffer Eugene MD;  Location: [facility] OR     HC COLONOSCOPY THRU STOMA WITH BIOPSY  10/29/2003    Impression is that of normal appearing colonoscopy, without evidence of rectal bleeding.     HC COLONOSCOPY THRU STOMA, DIAGNOSTIC  10/00    normal     HC COLONOSCOPY THRU STOMA, DIAGNOSTIC  Oct 2009    Dr López- normal     HC EEG AWAKE AND SLEEP      abnormal     HC MRI BRAIN W/O CONTRAST  12/00    normal     HC REMOVAL GALLBLADDER  8/5/2009    [facility].     HC UGI ENDOSCOPY DIAG W BIOPSY  11/11/09    Normal esophagus     HC UGI ENDOSCOPY, SIMPLE EXAM  7/00, 10/00    mild chronic esophagitis and duodenitis, neg H pylori     HC UGI ENDOSCOPY, SIMPLE EXAM  01/20/2005    Esophagogastroduodenoscopy, colonoscopy with biopsies.  [facility]     HC UGI ENDOSCOPY, SIMPLE EXAM  7/1/2009    [facility], [facility].      UGI ENDOSCOPY, SIMPLE EXAM  11/11/2009    attempted upper GI, pt. could not tolerate procedure:[facility]     ORTHOPEDIC SURGERY  October 19,2011    diskectomy L4-L5       Past medical history, past surgical history, medications, and allergies were reviewed with the patient. Additional pertinent items: None    FAMILY HISTORY:    Family History   Problem Relation Age of Onset     Gastrointestinal Disease Brother         severe Crohn's     Neurologic Disorder Brother         Seizures post head injury     Depression Brother      Substance Abuse Brother      Genitourinary Problems Father         kidney stones     Diabetes Father      Heart Disease Father         Open heart surgery     Breast Cancer Maternal Grandmother      Parkinsonism Maternal Grandmother      Cerebrovascular Disease Paternal Grandmother      Cancer Maternal Grandfather         Lung     Cardiovascular Paternal Grandfather         Heart Attack     Substance Abuse Mother         in recovery x1 year      Lung Cancer Paternal Uncle      Cancer Paternal Uncle      Lung Cancer Maternal Uncle        SOCIAL HISTORY:   Social History     Tobacco Use     Smoking status: Current Every Day Smoker     Packs/day: 0.25     Years: 5.00     Pack years: 1.25     Types: Cigarettes     Smokeless tobacco: Never Used   Substance Use Topics     Alcohol use: Not Currently     Comment: Not since last July 2018     Social history was reviewed with the patient. Additional pertinent items: None      Patient's Medications   New Prescriptions    No medications on file   Previous Medications    ACETAMINOPHEN (TYLENOL) 500 MG TABLET    Take 2 tablets (1,000 mg) by mouth 3 times daily as needed for mild pain    ALBUTEROL (PROAIR HFA/PROVENTIL HFA/VENTOLIN HFA) 108 (90 BASE) MCG/ACT INHALER    Inhale 2 puffs into the lungs every 6 hours as needed for shortness of breath / dyspnea or wheezing    ALBUTEROL (PROVENTIL) (2.5 MG/3ML) 0.083% NEB SOLUTION    Take 1 vial (2.5 mg) by nebulization every 6 hours as needed for shortness of breath / dyspnea or wheezing    ALUM & MAG HYDROXIDE-SIMETHICONE (MAALOX ADVANCED MAX ST) 400-400-40 MG/5ML SUSP SUSPENSION    Take 15 mLs by mouth every 4 hours as needed for indigestion (mix with lidocaine)    ARIPIPRAZOLE (ABILIFY) 15 MG TABLET    Take 1 tablet (15 mg) by mouth At  Bedtime    ASPIRIN (ASA) 81 MG TABLET    Take 1 tablet (81 mg) by mouth daily    AZELASTINE (OPTIVAR) 0.05 % OPHTHALMIC SOLUTION    Place 1 drop into both eyes 2 times daily    BUPROPION (WELLBUTRIN XL) 150 MG 24 HR TABLET    Take 1 tablet (150 mg) by mouth every morning    BUSPIRONE (BUSPAR) 15 MG TABLET    Take 1 tablet (15 mg) by mouth 2 times daily    CLONAZEPAM (KLONOPIN) 1 MG TABLET    Take 1 tablet (1 mg) by mouth 2 times daily as needed for anxiety    CYCLOBENZAPRINE (FLEXERIL) 10 MG TABLET    Take 1 tablet (10 mg) by mouth 3 times daily as needed for muscle spasms or other (back pain)    DIPHENHYDRAMINE (BENADRYL) 50 MG CAPSULE    Take 1-2 capsules ( mg) by mouth every 6 hours as needed for itching, allergies or sleep    DULOXETINE (CYMBALTA) 60 MG EC CAPSULE    Take 1 capsule (60 mg) by mouth daily    EPINEPHRINE (ANY BX GENERIC EQUIV) 0.3 MG/0.3ML INJECTION 2-PACK    Inject 0.3 mLs (0.3 mg) into the muscle once as needed for anaphylaxis    FEXOFENADINE (ALLEGRA) 180 MG TABLET    Take 1 tablet (180 mg) by mouth daily    GABAPENTIN (NEURONTIN) 300 MG CAPSULE    Take 1-3 capsules (300-900 mg) by mouth 3 times daily as needed (back pain)    LIDOCAINE (XYLOCAINE) 2 % SOLUTION    Swish and swallow 15 mLs in mouth every 4 hours as needed for other (GERD) ; Max 8 doses/24 hour period. Mix with 15 mLs Maalox or Mylanta.    LOSARTAN (COZAAR) 100 MG TABLET    Take 1 tablet (100 mg) by mouth daily    MEDICAL CANNABIS (PATIENT'S OWN SUPPLY)    (The purpose of this order is to document that the patient reports taking medical cannabis.  This is not a prescription, and is not used to certify that the patient has a qualifying medical condition.)    MEDROXYPROGESTERONE (DEPO-PROVERA) 150 MG/ML IM INJECTION    Inject 1 mL (150 mg) into the muscle every 3 months    METHOCARBAMOL (ROBAXIN) 750 MG TABLET    Take 1 tablet (750 mg) by mouth 3 times daily as needed for muscle spasms    METOPROLOL SUCCINATE ER (TOPROL-XL)  200 MG 24 HR TABLET    Take 1 tablet (200 mg) by mouth daily    NONFORMULARY    Apply 30 mLs topically 4 times daily    ONDANSETRON (ZOFRAN ODT) 4 MG ODT TAB    Take 2 tablets (8 mg) by mouth every 6 hours as needed for nausea or vomiting    PANTOPRAZOLE (PROTONIX) 40 MG EC TABLET    Take 1 tablet (40 mg) by mouth 2 times daily Take 30-60 minutes before a meal.    PRAZOSIN (MINIPRESS) 1 MG CAPSULE    Take 1 mg by mouth At Bedtime    PROCHLORPERAZINE (COMPAZINE) 10 MG TABLET    Take 1 tablet (10 mg) by mouth every 6 hours as needed for nausea or vomiting    RIZATRIPTAN (MAXALT-MLT) 5 MG ODT    Take 1-2 tablets (5-10 mg) by mouth at onset of headache for migraine May repeat in 2 hours. Max 6 tablets/24 hours.    SUCRALFATE (CARAFATE) 1 GM TABLET    Take 1 tablet (1 g) by mouth 4 times daily as needed (heartburn)    VALACYCLOVIR (VALTREX) 1000 MG TABLET    2 tabs at onset of mouth sores.  Repeat dose in 12 hours.   Modified Medications    No medications on file   Discontinued Medications    No medications on file          Allergies   Allergen Reactions     Iohexol Hives     Other reaction(s): Hives  IV contrast; patient states she tolerates contrast if she gets benadryl before  IV contrast; patient states she tolerates contrast if she gets benadryl before     Ativan [Lorazepam] Hives     At the IV site     Baclofen      hives     Bees Hives, Swelling and Difficulty breathing     Caffeine      Contrast Dye      Hives,   Updated 5/10/2016 CT Contrast.     Iodine Hives     Methocarbamol Swelling     Metoclopramide      Other reaction(s): Tremors  LW Reaction: shaking/sweating     Midazolam      Other reaction(s): Agitation     Monosodium Glutamate      Morphine Other (See Comments)     Difficulty with urination     Nsaids Other (See Comments)     GI BLEED x2     Other (Do Not Use) Other (See Comments)     Xanaflex- pt becomes disoriented and loses bladder control     Reglan [Metoclopramide Hcl] Other (See Comments)      shaking     Soma [Carisoprodol] Visual Disturbance     Sleep walking     Tizanidine      Topamax Other (See Comments)     Topamax [Topiramate] Nausea     Tingling  GI/Vomit     Tramadol      Severe Headache, Seizure Risk     Tylenol W/Codeine [Acetaminophen-Codeine] Nausea and Itching     Tylenol 3     Versed Other (See Comments)     Zolmitriptan      Makes face feel like its twitching     Droperidol Anxiety     Flu Virus Vaccine Rash      Arm swelling        Review of Systems   Constitutional: Positive for fatigue and fever.   Respiratory: Positive for cough. Negative for shortness of breath.    Gastrointestinal: Positive for abdominal pain. Negative for diarrhea, nausea and vomiting.   Musculoskeletal: Negative for back pain.   All other systems reviewed and are negative.    A complete review of systems was performed with pertinent positives and negatives noted in the HPI, and all other systems negative.    Physical Exam          Physical Exam  Constitutional:       Appearance: She is well-developed.      Comments: Communicates via ALS   HENT:      Head: Normocephalic and atraumatic.   Neck:      Musculoskeletal: Normal range of motion.   Cardiovascular:      Rate and Rhythm: Normal rate and regular rhythm.      Heart sounds: Normal heart sounds.   Pulmonary:      Effort: Pulmonary effort is normal. No respiratory distress.      Breath sounds: Normal breath sounds.   Abdominal:      General: There is no distension.      Palpations: Abdomen is soft.      Tenderness: There is no abdominal tenderness. There is no rebound.   Genitourinary:      Musculoskeletal:         General: No tenderness.   Skin:     General: Skin is warm and dry.   Neurological:      Mental Status: She is alert and oriented to person, place, and time.   Psychiatric:         Behavior: Behavior normal.         Thought Content: Thought content normal.         ED Course        Procedures                Results for orders placed or performed during  the hospital encounter of 04/18/20   CBC with platelets differential     Status: Abnormal   Result Value Ref Range    WBC 6.1 4.0 - 11.0 10e9/L    RBC Count 3.84 3.8 - 5.2 10e12/L    Hemoglobin 12.8 11.7 - 15.7 g/dL    Hematocrit 37.9 35.0 - 47.0 %    MCV 99 78 - 100 fl    MCH 33.3 (H) 26.5 - 33.0 pg    MCHC 33.8 31.5 - 36.5 g/dL    RDW 12.1 10.0 - 15.0 %    Platelet Count 233 150 - 450 10e9/L    Diff Method Automated Method     % Neutrophils 43.2 %    % Lymphocytes 42.0 %    % Monocytes 11.6 %    % Eosinophils 2.6 %    % Basophils 0.3 %    % Immature Granulocytes 0.3 %    Nucleated RBCs 0 0 /100    Absolute Neutrophil 2.6 1.6 - 8.3 10e9/L    Absolute Lymphocytes 2.6 0.8 - 5.3 10e9/L    Absolute Monocytes 0.7 0.0 - 1.3 10e9/L    Absolute Eosinophils 0.2 0.0 - 0.7 10e9/L    Absolute Basophils 0.0 0.0 - 0.2 10e9/L    Abs Immature Granulocytes 0.0 0 - 0.4 10e9/L    Absolute Nucleated RBC 0.0    Basic metabolic panel     Status: None   Result Value Ref Range    Sodium 138 133 - 144 mmol/L    Potassium 4.0 3.4 - 5.3 mmol/L    Chloride 107 94 - 109 mmol/L    Carbon Dioxide 26 20 - 32 mmol/L    Anion Gap 6 3 - 14 mmol/L    Glucose 86 70 - 99 mg/dL    Urea Nitrogen 17 7 - 30 mg/dL    Creatinine 0.88 0.52 - 1.04 mg/dL    GFR Estimate 87 >60 mL/min/[1.73_m2]    GFR Estimate If Black >90 >60 mL/min/[1.73_m2]    Calcium 9.0 8.5 - 10.1 mg/dL   CRP inflammation     Status: None   Result Value Ref Range    CRP Inflammation <2.9 0.0 - 8.0 mg/L   Erythrocyte sedimentation rate auto     Status: None   Result Value Ref Range    Sed Rate 9 0 - 20 mm/h   Colorectal Surgery Adult IP Consult: Patient to be seen: Routine within 24 hrs; Call back #: [id number]; anal fistula; Consultant may enter orders: Yes; Requesting provider? Attending physician     Status: None ()    Narrative    Robert Vernon MD     4/19/2020 10:36 AM  [facility]  Colon and Rectal Surgery Consult Note  Name: Katy Islas    MRN: [id number]  Date of Birth:  1990    Age: 30 year old  Date of admission: 4/18/2020  Primary care provider: Aura Rosario           History of Present Illness:   Katy Islas is a very pleasant 30 year old female with   congenital hearing loss? (need ), Crohn's disease,   renal disease, GERD, C.difficile, HTN, bipolar disorder, likely   borderline personality disorder, fibromyalgia and drug seeking   behavior who presents today for abdominal pain and fevers.  She   has ongoing perianal pain which we have seen her several times   for in the past.  She was most recently seen in our clinic on   April 16.  An outpatient MRI was ordered.  This was completed   inpatient on this day.  I have reviewed the imaging.  It shows a   very small fissure/fistula in the posterior midline.  There is no   evidence of undrained perianal sepsis.    In discussion with the patient, she states that her pain is   exactly the same as when she was seen in clinic on April 16.  She   continues to have a small amount of drainage and intermittent   pain.  The pain is not severe.  She states that her abdominal   pain and fevers are what brought her to the emergency room.    Please see the clinic note dated April 16 for full details of her   history.    A video based  was used to   complete the interview.            Past Medical History:     Past Medical History:   Diagnosis Date   ?  Abdominal pain 10/31- 11/4/2005    [facility] admit for [facility]   ?  Allergic rhinitis, cause unspecified     Allergic rhinitis   ?  Arthritis    ?  C. difficile diarrhea     Past, no current diarrhea.   ?  Crohn's disease (H)     sees Dr Summers or Ryan at [facility] in [place]   ?  Crohn's disease (H)    ?  Depression with anxiety 2003    Dr Bernard (psychiatry) at [facility],    ?  Esophageal reflux     GERD   ?  Grand mal seizure disorder (H) 10/8/2013   ?  Hypertension    ?  Intestinal infection due to  Clostridium difficile 10/00    C diff culture and toxin positive, treated with Flagyl   ?  Localization-related (focal) (partial) epilepsy and epileptic   syndromes with simple partial seizures, without mention of   intractable epilepsy     pseudoseizures diagnosed after extensive neurologic eval   ?  Migraine 07/21/12    D/C 07/22/12-[facility]   ?  Migraine, unspecified, without mention of intractable migraine   without mention of status migrainosus     Migraine   ?  Mild intermittent asthma     mild intermittent   ?  Mycoplasma infection in conditions classified elsewhere and of   unspecified site    ?  Other chronic pain     Back pain for 6 years   ?  Renal disease     Kidney stones   ?  Unspecified hearing loss     congenital hearing loss             Past Surgical History:     Past Surgical History:   Procedure Laterality Date   ?  AS REMOVAL OF COCCYX  10/18/2017   ?  C FUSION OF SACROILIAC JOINT  10/26/2018   ?  COLONOSCOPY  7/1/2009    [facility].   ?  COLONOSCOPY N/A 7/17/2019    Procedure: Colonoscopy, With Polypectomy And Biopsy;  Surgeon:   Edwin Conde MD;  Location: [facility] OR   ?  COLONOSCOPY WITH CO2 INSUFFLATION N/A 7/17/2019    Procedure: COLONOSCOPY, WITH CO2 INSUFFLATION;  Surgeon:   Edwin Conde MD;  Location: [facility] OR   ?  COMBINED ESOPHAGOSCOPY, GASTROSCOPY, DUODENOSCOPY (EGD) WITH   CO2 INSUFFLATION N/A 4/12/2019    Procedure: COMBINED ESOPHAGOSCOPY, GASTROSCOPY, DUODENOSCOPY   (EGD) WITH CO2 INSUFFLATION;  Surgeon: Edwin Conde MD;  Location: [facility] OR   ?  ESOPHAGOSCOPY, GASTROSCOPY, DUODENOSCOPY (EGD), COMBINED N/A   4/12/2019    Procedure: Combined Esophagoscopy, Gastroscopy, Duodenoscopy   (Egd), Biopsy Single Or Multiple;  Surgeon: Edwin Conde MD;  Location: [facility] OR   ?  FUSION SPINE POSTERIOR MINIMALLY INVASIVE ONE LEVEL N/A   2/23/2017    Procedure: FUSION SPINE POSTERIOR MINIMALLY INVASIVE ONE LEVEL;    L4-5  Oblique Lateral Lumbar Interbody Fusion   Epidural steroid injection.  ? Transpedicular Bone marrow aspiration;  Surgeon: Jeniffer Eugene MD;  Location: [facility] OR   ?  HC COLONOSCOPY THRU STOMA WITH BIOPSY  10/29/2003    Impression is that of normal appearing colonoscopy, without   evidence of rectal bleeding.   ?  HC COLONOSCOPY THRU STOMA, DIAGNOSTIC  10/00    normal   ?  HC COLONOSCOPY THRU STOMA, DIAGNOSTIC  Oct 2009    Dr López- normal   ?  HC EEG AWAKE AND SLEEP      abnormal   ?  HC MRI BRAIN W/O CONTRAST  12/00    normal   ?  HC REMOVAL GALLBLADDER  8/5/2009    [facility] [place].   ?   UGI ENDOSCOPY DIAG W BIOPSY  11/11/09    Normal esophagus   ?   UGI ENDOSCOPY, SIMPLE EXAM  7/00, 10/00    mild chronic esophagitis and duodenitis, neg H pylori   ?   UGI ENDOSCOPY, SIMPLE EXAM  01/20/2005    Esophagogastroduodenoscopy, colonoscopy with biopsies.    [facility]   ?   UGI ENDOSCOPY, SIMPLE EXAM  7/1/2009    [facility].   ?   UGI ENDOSCOPY, SIMPLE EXAM  11/11/2009    attempted upper GI, pt. could not tolerate procedure:[facility]   ?  ORTHOPEDIC SURGERY  October 19,2011    diskectomy L4-L5               Social History:     Social History     Tobacco Use   ?  Smoking status: Current Every Day Smoker     Packs/day: 0.25     Years: 5.00     Pack years: 1.25     Types: Cigarettes   ?  Smokeless tobacco: Never Used   Substance Use Topics   ?  Alcohol use: Not Currently     Comment: Not since last July 2018             Family History:     Family History   Problem Relation Age of Onset   ?  Gastrointestinal Disease Brother         severe Crohn's   ?  Neurologic Disorder Brother         Seizures post head injury   ?  Depression Brother    ?  Substance Abuse Brother    ?  Genitourinary Problems Father         kidney stones   ?  Diabetes Father    ?  Heart Disease Father         Open heart surgery   ?  Breast Cancer Maternal Grandmother    ?   Parkinsonism Maternal Grandmother    ?  Cerebrovascular Disease Paternal Grandmother    ?  Cancer Maternal Grandfather         Lung   ?  Cardiovascular Paternal Grandfather         Heart Attack   ?  Substance Abuse Mother         in recovery x1 year    ?  Lung Cancer Paternal Uncle    ?  Cancer Paternal Uncle    ?  Lung Cancer Maternal Uncle              Allergies:     Allergies   Allergen Reactions   ?  Iohexol Hives     Other reaction(s): Hives  IV contrast; patient states she tolerates contrast if she gets   benadryl before  IV contrast; patient states she tolerates contrast if she gets   benadryl before   ?  Ativan [Lorazepam] Hives     At the IV site   ?  Baclofen      hives   ?  Bees Hives, Swelling and Difficulty breathing   ?  Caffeine    ?  Contrast Dye      Hives,   Updated 5/10/2016 CT Contrast.   ?  Iodine Hives   ?  Methocarbamol Swelling   ?  Metoclopramide      Other reaction(s): Tremors  LW Reaction: shaking/sweating   ?  Midazolam      Other reaction(s): Agitation   ?  Monosodium Glutamate    ?  Morphine Other (See Comments)     Difficulty with urination   ?  Nsaids Other (See Comments)     GI BLEED x2   ?  Other (Do Not Use) Other (See Comments)     Xanaflex- pt becomes disoriented and loses bladder control   ?  Reglan [Metoclopramide Hcl] Other (See Comments)     shaking   ?  Soma [Carisoprodol] Visual Disturbance     Sleep walking   ?  Tizanidine    ?  Topamax Other (See Comments)   ?  Topamax [Topiramate] Nausea     Tingling  GI/Vomit   ?  Tramadol      Severe Headache, Seizure Risk   ?  Tylenol W/Codeine [Acetaminophen-Codeine] Nausea and Itching     Tylenol 3   ?  Versed Other (See Comments)   ?  Zolmitriptan      Makes face feel like its twitching   ?  Droperidol Anxiety   ?  Flu Virus Vaccine Rash      Arm swelling             Medications:     ?  ARIPiprazole  15 mg Oral At Bedtime   ?  aspirin  81 mg Oral Daily   ?  buPROPion  150 mg Oral QAM   ?  busPIRone  15 mg Oral BID   ?  DULoxetine   "60 mg Oral Daily   ?  fexofenadine  180 mg Oral Daily   ?  gabapentin  300 mg Oral TID   ?  losartan  100 mg Oral Daily   ?  metoprolol succinate ER  200 mg Oral Daily   ?  pantoprazole  40 mg Oral BID   ?  prazosin  1 mg Oral At Bedtime             Review of Systems:   A comprehensive greater than 10 system review of systems was   carried out.  Pertinent positives and negatives are noted above.    Otherwise negative for contributory info.            Physical Exam:     Blood pressure 121/69, pulse 87, temperature 98.6  F (37  C),   temperature source Oral, resp. rate 16, height 5' 2\", weight 188   lb, SpO2 94 %, not currently breastfeeding.  No intake or output data in the 24 hours ending 04/19/20 1023    General - Awake alert and oriented, appears stated age  Pulm ?- Non-labored breathing with normal respiratory effort  CVS - reg rate and rhythm, no peripheral edema  Abd -  No guarding, rigidity or peritoneal signs.   Rectal exam was deferred as patient was just examined in our   clinic on 4/16. Please see documented colorectal exam in that   note  Neuro ?- CN II-XII grossly intact  Musculoskeletal ?- extremities with no clubbing, cyanosis or   edema  Psych ?- responsive, alert, cooperative; oriented x3; appropriate   mood and affect  External/skin ?- inspection reveals no rashes         Data Reviewed:   No results found for this or any previous visit (from the past 24   hour(s)).    Recent Labs   Lab 04/19/20  0320 04/18/20  0028   WBC 6.1 7.4   HGB 12.8 13.3   HCT 37.9 40.3   MCV 99 100    240         Assessment and Plan:   Katy Islas is a 30 year old female who presented with   abdominal pain and fevers. We were consulted for her perianal   pain.  She was recently seen in our clinic on April 16 for this   same problem.  Please see the clinic note for full details of her   history and our detailed recommendations.  MRI was completed   which shows no undrained fluid collection.  She has a very small "   fistula.  I do not believe that this fistula is related to her   abdominal pain or fevers.  No urgent surgical intervention is   necessary.  Could consider fistulotomy versus seton placement as   an outpatient.  However, this is an elective surgery and due to   COVID-19 restrictions would plan to manage symptomatically at   this time.    -Warm water baths  -Avoid constipation  -Tylenol as needed for pain control  -Outpatient colorectal follow up already arranged  -Colorectal surgery will sign off      Robert Vernon MD  Colon and Rectal Surgery Fellow  Pager: [phone]         Medications   0.9% sodium chloride BOLUS (0 mLs Intravenous Stopped 4/19/20 0529)   prochlorperazine (COMPAZINE) injection 10 mg (10 mg Intravenous Given 4/19/20 0328)   oxyCODONE (ROXICODONE) tablet 5 mg (5 mg Oral Given 4/19/20 0059)   diphenhydrAMINE (BENADRYL) injection 25 mg (25 mg Intravenous Given 4/19/20 0329)               Results for orders placed or performed during the hospital encounter of 04/17/20 (from the past 24 hour(s))   MR Pelvis (Intrapelvic Organs) wo Contrast    Narrative    EXAM: MRI PELVIS WITHOUT CONTRAST   LOCATION: [facility]  DATE/TIME: 4/17/2020 11:28 PM      INDICATION: History of Crohn's disease. Bloody stool this evening. History of anal fistula.     COMPARISON: CT 3/20/2020.    TECHNIQUE: Routine MRI anal protocol including T1 in/out phase, diffusion, thin section high resolution T2 images.    FINDINGS:   PERIANAL TISSUES: Very tiny linear focus of high T2 signal along the posterior margin of the anus at 6 o'clock position consistent with a tiny fissure or fistula. This is seen, for example, on series 5, image 15. This measures approximately 8 mm in   length and 2 mm in diameter. Minimal surrounding fat stranding/inflammation but no significant associated fluid collection or abscess.    ADDITIONAL FINDINGS: Uterus is present. Dominant follicle right ovary measuring 1.5 cm. No ascites, fluid  collections, or other significant findings in the visualized portion of the pelvis.      Impression    IMPRESSION:  1.  Tiny posterior anal fissure or fistula.    2.  No associated fluid collection or abscess.     CBC with platelets differential   Result Value Ref Range    WBC 7.4 4.0 - 11.0 10e9/L    RBC Count 4.03 3.8 - 5.2 10e12/L    Hemoglobin 13.3 11.7 - 15.7 g/dL    Hematocrit 40.3 35.0 - 47.0 %     78 - 100 fl    MCH 33.0 26.5 - 33.0 pg    MCHC 33.0 31.5 - 36.5 g/dL    RDW 12.1 10.0 - 15.0 %    Platelet Count 240 150 - 450 10e9/L    Diff Method Automated Method     % Neutrophils 58.5 %    % Lymphocytes 29.4 %    % Monocytes 10.9 %    % Eosinophils 0.8 %    % Basophils 0.3 %    % Immature Granulocytes 0.1 %    Nucleated RBCs 0 0 /100    Absolute Neutrophil 4.3 1.6 - 8.3 10e9/L    Absolute Lymphocytes 2.2 0.8 - 5.3 10e9/L    Absolute Monocytes 0.8 0.0 - 1.3 10e9/L    Absolute Eosinophils 0.1 0.0 - 0.7 10e9/L    Absolute Basophils 0.0 0.0 - 0.2 10e9/L    Abs Immature Granulocytes 0.0 0 - 0.4 10e9/L    Absolute Nucleated RBC 0.0    Comprehensive metabolic panel   Result Value Ref Range    Sodium 140 133 - 144 mmol/L    Potassium 3.9 3.4 - 5.3 mmol/L    Chloride 111 (H) 94 - 109 mmol/L    Carbon Dioxide 22 20 - 32 mmol/L    Anion Gap 8 3 - 14 mmol/L    Glucose 86 70 - 99 mg/dL    Urea Nitrogen 16 7 - 30 mg/dL    Creatinine 0.87 0.52 - 1.04 mg/dL    GFR Estimate 89 >60 mL/min/[1.73_m2]    GFR Estimate If Black >90 >60 mL/min/[1.73_m2]    Calcium 8.7 8.5 - 10.1 mg/dL    Bilirubin Total 0.6 0.2 - 1.3 mg/dL    Albumin 3.8 3.4 - 5.0 g/dL    Protein Total 7.2 6.8 - 8.8 g/dL    Alkaline Phosphatase 99 40 - 150 U/L    ALT 39 0 - 50 U/L    AST 50 (H) 0 - 45 U/L   Blood culture    Specimen: Hand, Left; Blood    Venipuncture Collection VPT   Result Value Ref Range    Specimen Description Blood Venipuncture Collection VPT     Culture Micro No growth after 16 hours    Blood culture    Specimen: Hand, Right; Blood     Venipuncture Collection VPT   Result Value Ref Range    Specimen Description Blood Venipuncture Collection VPT     Culture Micro No growth after 16 hours    CRP inflammation   Result Value Ref Range    CRP Inflammation <2.9 0.0 - 8.0 mg/L   Erythrocyte sedimentation rate auto   Result Value Ref Range    Sed Rate 9 0 - 20 mm/h   UA reflex to Microscopic and Culture    Specimen: Urine Midstream; Midstream Urine   Result Value Ref Range    Color Urine Yellow     Appearance Urine Clear     Glucose Urine Negative NEG^Negative mg/dL    Bilirubin Urine Negative NEG^Negative    Ketones Urine Negative NEG^Negative mg/dL    Specific Gravity Urine 1.022 1.003 - 1.035    Blood Urine Small (A) NEG^Negative    pH Urine 5.5 5.0 - 7.0 pH    Protein Albumin Urine Negative NEG^Negative mg/dL    Urobilinogen mg/dL Normal 0.0 - 2.0 mg/dL    Nitrite Urine Negative NEG^Negative    Leukocyte Esterase Urine Small (A) NEG^Negative    Source Midstream Urine     RBC Urine 12 (H) 0 - 2 /HPF    WBC Urine 8 (H) 0 - 5 /HPF    Bacteria Urine Few (A) NEG^Negative /HPF    Squamous Epithelial /HPF Urine 3 (H) 0 - 1 /HPF    Mucous Urine Present (A) NEG^Negative /LPF   Urine Culture Aerobic Bacterial    Specimen: Unspecified Urine   Result Value Ref Range    Specimen Description Unspecified Urine     Special Requests Specimen received in preservative     Culture Micro PENDING      *Note: Due to a large number of results and/or encounters for the requested time period, some results have not been displayed. A complete set of results can be found in Results Review.     Medications - No data to display          Assessments & Plan (with Medical Decision Making)   Patient is a 30-year-old female with a history of possible Crohn's disease who signed out AMA from the hospital last night who presents back to the ER complaining of subjective fever and increased anal pain.  Patient had an MRI of her pelvis that showed she has a possible anal fissure versus  fistula.  No obvious abscess was visualized.  On exam patient has no signs of an obvious abscess.  I discussed the case with Dr. Manny Gibson as well as the [facility] resident who knows the patient well.  I told the patient that would continue to follow the family medicine's recommendations for pain medications as it appears the reason she signed out AMA was because she was not receiving IV Dilaudid.  Patient here asking repeatedly for IV Dilaudid and I told him that we would give her some IV Compazine and IV fluids but we follow the recommendations of family medicine regarding oral pain medications.  Patient initially was very reluctant to get labs.  We waited more than 3 hours before she allowed us to put an IV in and get blood work.  Patient allows us to get blood work because of the fact that she has a fever will readmit her to the [facility] service.  Patient was signed out to the evening doctor for completion of care    I have reviewed the nursing notes.    I have reviewed the findings, diagnosis, plan and need for follow up with the patient.    New Prescriptions    No medications on file       Final diagnoses:   Perianal fissure   Nausea and vomiting, intractability of vomiting not specified, unspecified vomiting type       4/18/2020   [facility], [place], EMERGENCY DEPARTMENT     Korina Alvarez MD  04/19/20 2191

## 2020-04-19 NOTE — ED NOTES
"Patient continues to ask for IV dilaudid for pain stating the oral oxycodone \"doesn't help.\" MD notified.   "

## 2020-04-20 ENCOUNTER — PATIENT OUTREACH (OUTPATIENT)
Dept: CARE COORDINATION | Facility: CLINIC | Age: 30
End: 2020-04-20

## 2020-04-20 DIAGNOSIS — G89.29 CHRONIC ABDOMINAL PAIN: ICD-10-CM

## 2020-04-20 DIAGNOSIS — K60.2 ANAL FISSURE: Primary | ICD-10-CM

## 2020-04-20 DIAGNOSIS — R10.9 CHRONIC ABDOMINAL PAIN: ICD-10-CM

## 2020-04-20 LAB
BACTERIA SPEC CULT: ABNORMAL
Lab: ABNORMAL
SPECIMEN SOURCE: ABNORMAL

## 2020-04-20 NOTE — PROGRESS NOTES
Patient was contacted by an RN for post DC follow up so no duplicate post DC follow up call will be made    Tasneem Hauser CMA   Post Hospital Discharge Team

## 2020-04-20 NOTE — PROGRESS NOTES
Clinic Care Coordination Contact  [facility]/Voicemail       Clinical Data: Care Coordinator Outreach  Outreach attempted x 1.  Left message on patient's voicemail with call back information and requested return call.  Plan: Care Coordinator will try to reach patient again in 1-2 business days.    EDYTA OttoN RN Clinic Care Coordinator   [place], [place], [place]  Phone: [phone]

## 2020-04-21 ENCOUNTER — NURSE TRIAGE (OUTPATIENT)
Dept: NURSING | Facility: CLINIC | Age: 30
End: 2020-04-21

## 2020-04-21 ENCOUNTER — MYC MEDICAL ADVICE (OUTPATIENT)
Dept: GASTROENTEROLOGY | Facility: CLINIC | Age: 30
End: 2020-04-21

## 2020-04-21 NOTE — TELEPHONE ENCOUNTER
On April 17, 2020 Pt was having issues with a Rectal Fistula.     Pt had a procedure done and was in the hospital over night.    Since the procedure the Pt has not been feeling good.  Nausea, upset stomach, some abdominal pain that comes and goes.  Pt noticed blood in the toilet 2 different times last night.   Pt also noticed some pus coming from the rectal area.  Increased weakness and fatigue.   Pt wondering if her blood level is low.   Very thirsty.   Drinking lots of water.  Not much appetite.  Having regular bowel movements.    No diarrhea or constipation noted.   Also reporting burning, urgency, frequency upon urination for the last couple of weeks.  Had some chills off and on.    A fever a few days ago.  But not today.  Pt was so tired she was ready to fall asleep on the phone with me.  I suggested she go to the ER.    She Refused the ER because she told me they told her there was nothing they could do for her.     Pt would like a call back from the clinic for further assistance.    Pl call Pt at [phone].    Kelli Casiano RN  Central Triage Red Flags/Med Refills            Reason for Disposition    SEVERE rectal bleeding (large blood clots; on and off, or constant bleeding)    Additional Information    Negative: Sounds like a life-threatening emergency to the triager    Negative: Diarrhea is the main symptom    Negative: Constipation is the main symptom (e.g., pain or discomfort caused by passage of hard BMs)    Blood in or on bowel movement is the main symptom    Negative: Passed out (i.e., fainted, collapsed and was not responding)    Negative: Shock suspected (e.g., cold/pale/clammy skin, too weak to stand, low BP, rapid pulse)    Negative: Vomiting red blood or black (coffee ground) material    Negative: Sounds like a life-threatening emergency to the triager    Negative: Diarrhea is the main symptom    Negative: Rectal symptoms    Protocols used: RECTAL BLEEDING-A-OH, RECTAL SYMPTOMS-A-OH

## 2020-04-21 NOTE — TELEPHONE ENCOUNTER
Called and discussed symptoms with patient. Informed her that ERIKA Blair, and Dr. Sanchez reviewed her records. Advised her that eventually need an EUA for her fistula, but it is not urgent. In the meantime she needs to follow-up with her gastroenterologist to manage Crohn's. She stated that her gastroenterologist at [facility] will not see her. She is looking for a new gastroenterologist. I will review with ERIKA Blair, and Dr. Sanchez for their recommendations. I also advised her that if her fever spikes 101 degrees Fahrenheit that we could check her WBC. Patient was in agreement with plan.    Chau Edward, EMT  Colon and Rectal Surgery

## 2020-04-21 NOTE — PROGRESS NOTES
Clinic Care Coordination Contact  [facility]    Referral Source: ED Follow-Up  Clinical Data: Care Coordinator Outreach  Outreach attempted x 2.  Patient is hard of hearing and seems to prefer to use mychart for communication. CC RN sent patient a detailed message with follow up plans.   Plan: Care Coordinator sent care coordination introduction letter on 3/16/20 via mail. Care Coordinator will do no further outreaches at this time.    RADHA Otto RN Clinic Care Coordinator   [place], [place], [place]  Phone: [phone]

## 2020-04-22 ENCOUNTER — TELEPHONE (OUTPATIENT)
Dept: FAMILY MEDICINE | Facility: OTHER | Age: 30
End: 2020-04-22

## 2020-04-22 ENCOUNTER — TELEPHONE (OUTPATIENT)
Dept: AUDIOLOGY | Facility: CLINIC | Age: 30
End: 2020-04-22

## 2020-04-22 NOTE — TELEPHONE ENCOUNTER
Options should include both and can consider evisit. Though if she is having any worsening of symptoms, in office appt would be easier to truly evaluate.  Isabel Cedillo MD

## 2020-04-22 NOTE — TELEPHONE ENCOUNTER
Will route to covering providers to review/advise on in clinic f/u vs virtual f/u  Katy Sheikh CMA

## 2020-04-22 NOTE — TELEPHONE ENCOUNTER
Reason for Call:  Other appointment    Detailed comments: Pt called and is wondering if she should come in to make an apt for Hospital F/U or do a phone visit with . Please Advise thank you    Phone Number Patient can be reached at: Home number on file [phone] (home)    Best Time: anytime    Can we leave a detailed message on this number? YES    Call taken on 4/22/2020 at 9:09 AM by Yahaira Watson

## 2020-04-22 NOTE — TELEPHONE ENCOUNTER
Reason for Call:  Other call back    Detailed comments: Pt called and is wondering were she can drop off her hearing aids. She said to leave a message. Please advise thank you    Phone Number Patient can be reached at: Home number on file [phone] (home)    Best Time: anytime    Can we leave a detailed message on this number? YES    Call taken on 4/22/2020 at 9:13 AM by Yahaira Watsno

## 2020-04-22 NOTE — TELEPHONE ENCOUNTER
I called the patient back and let her know that I will be at the [facility] this afternoon. She will drop the hearing aids off around 1 PM and will wait in her car until they are done.    Michelle Harris, RADHA-A  4/22/2020

## 2020-04-23 ENCOUNTER — TELEPHONE (OUTPATIENT)
Dept: SURGERY | Facility: CLINIC | Age: 30
End: 2020-04-23

## 2020-04-23 ENCOUNTER — MYC MEDICAL ADVICE (OUTPATIENT)
Dept: FAMILY MEDICINE | Facility: OTHER | Age: 30
End: 2020-04-23

## 2020-04-23 ENCOUNTER — MYC MEDICAL ADVICE (OUTPATIENT)
Dept: SURGERY | Facility: CLINIC | Age: 30
End: 2020-04-23

## 2020-04-23 ENCOUNTER — VIRTUAL VISIT (OUTPATIENT)
Dept: FAMILY MEDICINE | Facility: OTHER | Age: 30
End: 2020-04-23
Payer: MEDICARE

## 2020-04-23 ENCOUNTER — TELEPHONE (OUTPATIENT)
Dept: FAMILY MEDICINE | Facility: CLINIC | Age: 30
End: 2020-04-23

## 2020-04-23 ENCOUNTER — ALLIED HEALTH/NURSE VISIT (OUTPATIENT)
Dept: FAMILY MEDICINE | Facility: OTHER | Age: 30
End: 2020-04-23
Payer: MEDICARE

## 2020-04-23 ENCOUNTER — TELEPHONE (OUTPATIENT)
Dept: FAMILY MEDICINE | Facility: OTHER | Age: 30
End: 2020-04-23

## 2020-04-23 VITALS — SYSTOLIC BLOOD PRESSURE: 126 MMHG | DIASTOLIC BLOOD PRESSURE: 78 MMHG

## 2020-04-23 DIAGNOSIS — N76.0 BV (BACTERIAL VAGINOSIS): Primary | ICD-10-CM

## 2020-04-23 DIAGNOSIS — B96.89 BV (BACTERIAL VAGINOSIS): Primary | ICD-10-CM

## 2020-04-23 DIAGNOSIS — N89.8 VAGINAL DISCHARGE: ICD-10-CM

## 2020-04-23 DIAGNOSIS — Z98.1 S/P LUMBAR FUSION: ICD-10-CM

## 2020-04-23 DIAGNOSIS — K50.113 CROHN'S DISEASE OF COLON WITH FISTULA (H): ICD-10-CM

## 2020-04-23 DIAGNOSIS — Z30.9 CONTRACEPTIVE MANAGEMENT: Primary | ICD-10-CM

## 2020-04-23 DIAGNOSIS — M51.16 LUMBAR DISC DISEASE WITH RADICULOPATHY: ICD-10-CM

## 2020-04-23 DIAGNOSIS — K60.30 ANAL FISTULA: Primary | ICD-10-CM

## 2020-04-23 DIAGNOSIS — J45.30 MILD PERSISTENT ASTHMA WITHOUT COMPLICATION: ICD-10-CM

## 2020-04-23 LAB
HCG UR QL: NEGATIVE
SPECIMEN SOURCE: ABNORMAL
WET PREP SPEC: ABNORMAL

## 2020-04-23 PROCEDURE — 81025 URINE PREGNANCY TEST: CPT | Performed by: PHYSICIAN ASSISTANT

## 2020-04-23 PROCEDURE — 96372 THER/PROPH/DIAG INJ SC/IM: CPT

## 2020-04-23 PROCEDURE — 99442 ZZC PHYSICIAN TELEPHONE EVALUATION 11-20 MIN: CPT | Performed by: FAMILY MEDICINE

## 2020-04-23 PROCEDURE — 87210 SMEAR WET MOUNT SALINE/INK: CPT | Performed by: FAMILY MEDICINE

## 2020-04-23 RX ORDER — ALBUTEROL SULFATE 0.83 MG/ML
2.5 SOLUTION RESPIRATORY (INHALATION) EVERY 6 HOURS PRN
Qty: 50 VIAL | Refills: 11 | Status: SHIPPED | OUTPATIENT
Start: 2020-04-23 | End: 2021-04-27

## 2020-04-23 RX ORDER — METRONIDAZOLE 500 MG/1
500 TABLET ORAL 2 TIMES DAILY
Qty: 14 TABLET | Refills: 0 | Status: SHIPPED | OUTPATIENT
Start: 2020-04-23 | End: 2020-05-18

## 2020-04-23 RX ORDER — ALBUTEROL SULFATE 90 UG/1
2 AEROSOL, METERED RESPIRATORY (INHALATION) EVERY 6 HOURS PRN
Qty: 3 INHALER | Refills: 3 | Status: SHIPPED | OUTPATIENT
Start: 2020-04-23 | End: 2020-09-22

## 2020-04-23 RX ORDER — HYDROMORPHONE HYDROCHLORIDE 2 MG/1
2 TABLET ORAL EVERY 6 HOURS PRN
Qty: 10 TABLET | Refills: 0 | Status: ON HOLD | OUTPATIENT
Start: 2020-04-23 | End: 2020-04-30

## 2020-04-23 ASSESSMENT — ANXIETY QUESTIONNAIRES
6. BECOMING EASILY ANNOYED OR IRRITABLE: NEARLY EVERY DAY
7. FEELING AFRAID AS IF SOMETHING AWFUL MIGHT HAPPEN: NOT AT ALL
2. NOT BEING ABLE TO STOP OR CONTROL WORRYING: NOT AT ALL
3. WORRYING TOO MUCH ABOUT DIFFERENT THINGS: NOT AT ALL
GAD7 TOTAL SCORE: 12
IF YOU CHECKED OFF ANY PROBLEMS ON THIS QUESTIONNAIRE, HOW DIFFICULT HAVE THESE PROBLEMS MADE IT FOR YOU TO DO YOUR WORK, TAKE CARE OF THINGS AT HOME, OR GET ALONG WITH OTHER PEOPLE: EXTREMELY DIFFICULT
5. BEING SO RESTLESS THAT IT IS HARD TO SIT STILL: NEARLY EVERY DAY
1. FEELING NERVOUS, ANXIOUS, OR ON EDGE: NEARLY EVERY DAY

## 2020-04-23 ASSESSMENT — PATIENT HEALTH QUESTIONNAIRE - PHQ9
SUM OF ALL RESPONSES TO PHQ QUESTIONS 1-9: 11
5. POOR APPETITE OR OVEREATING: NEARLY EVERY DAY

## 2020-04-23 NOTE — PATIENT INSTRUCTIONS
Katy,    It was great seeing you in clinic today.  I summarized our discussion and your plan below.  Please let me know if you have any questions or concerns.  Please follow up with me as discussed in clinic or sooner if any worsening or additional concerns.     1. Anal fistula  30-year-old female with history of Crohn's disease with anal fistula, approximately 5 days ago was discharged from hospital after having surgery to decompress abscess.  She has increasing symptoms of the same now.  I called and discussed this with her rectal surgeons team, they are scheduling her for a visit tomorrow.  She was previously prescribed hydromorphone, I have given her a limited supply to get her to her appointment.  Follow-up no improvement or any worsening.  - HYDROmorphone (DILAUDID) 2 MG tablet; Take 1 tablet (2 mg) by mouth every 6 hours as needed for pain  Dispense: 10 tablet; Refill: 0    2. Vaginal discharge  She has noticed some white vaginal discharge that started a few days ago.  Obtaining wet prep.  - Wet prep; Future    3. Crohn's disease of colon with fistula (H)  See above.       Sincerely,  Dr. ANGEL Boles MD, FAAFP  Family Medicine Physician  [facility] [place]  [address] [place] [address]    Patient Education

## 2020-04-23 NOTE — TELEPHONE ENCOUNTER
Patient has not reviewed [facility] message but does have an appointment scheduled for today. Will close encounter  Katy Sheikh CMA

## 2020-04-23 NOTE — TELEPHONE ENCOUNTER
[facility] 12pm tomorrow with EVM. Waiting to hear back from Pt to confirm appt.    Gaudencio De La Cruz LPN

## 2020-04-23 NOTE — PROGRESS NOTES
Clinic Administered Medication Documentation      Depo Provera Documentation    URINE HCG: negative    Depo-Provera Standing Order inclusion/exclusion criteria reviewed.   Patient meets: inclusion criteria     BP: 126/78  LAST PAP/EXAM:   Lab Results   Component Value Date    PAP NIL 01/16/2020       Prior to injection, verified patient identity using patient's name and date of birth. Medication was administered. Please see MAR and medication order for additional information.     Was entire vial of medication used? Yes  Vial/Syringe: Single dose vial  Expiration Date:  11/2020    Patient instructed to remain in clinic for 15 minutes and report any adverse reaction to staff immediately .  NEXT INJECTION DUE: 7/9/20 - 7/23/20  Left terrance Sheikh, CMA

## 2020-04-23 NOTE — PROGRESS NOTES
"Katy Islas is a 30 year old female who is being evaluated via a billable telephone visit.      The patient has been notified of following:     \"This telephone visit will be conducted via a call between you and your physician/provider. We have found that certain health care needs can be provided without the need for a physical exam.  This service lets us provide the care you need with a short phone conversation.  If a prescription is necessary we can send it directly to your pharmacy.  If lab work is needed we can place an order for that and you can then stop by our lab to have the test done at a later time.    Telephone visits are billed at different rates depending on your insurance coverage. During this emergency period, for some insurers they may be billed the same as an in-person visit.  Please reach out to your insurance provider with any questions.    If during the course of the call the physician/provider feels a telephone visit is not appropriate, you will not be charged for this service.\"    Patient has given verbal consent for Telephone visit?  Yes    How would you like to obtain your AVS? Billie    Subjective     Katy Islas is a 30 year old female who presents to clinic today for the following health issues:    HPI  Back Pain       Duration: pain started a while ago but this morning it was severe pain        Specific cause: possible anal fissula per patient    Description:   Location of pain: low back both  Character of pain: dull ache and it was intermittent but now constant   Pain radiation:radiates into the right buttocks, radiates into the right leg and radiates into the left buttocks  New numbness or weakness in legs, not attributed to pain:  no     Intensity: Currently 6/10    History:   Pain interferes with job: YES  History of back problems: yes, a few back surgeries   Any previous MRI or X-rays: Yes- at [facility].  Date 2019  Sees a specialist for back pain:  In the past she has, but not " currently  Therapies tried without relief: ibuprofen and Tylenol    Alleviating factors:   Improved by: nothing      Precipitating factors:  Worsened by: Bending, Standing and Sitting        Accompanying Signs & Symptoms:  Risk of Fracture:  None  Risk of Cauda Equina:  None  Risk of Infection:  None  Risk of Cancer:  None  Risk of Ankylosing Spondylitis:  Onset at age <35, male, AND morning back stiffness. no          Patient Active Problem List   Diagnosis     Hearing loss     Allergic rhinitis     Migraine     Benign neoplasm of skin of lower limb, including hip     Dysmenorrhea     Crohn's colitis (H)     Mild major depression (H)     Anxiety     Chronic pain     Bipolar disorder (H)     Marijuana abuse     Adjustment disorder with mixed anxiety and depressed mood     Herpes simplex virus infection     Gastroesophageal reflux disease without esophagitis     Bee sting allergy     Mild persistent asthma without complication     Chronic bilateral low back pain with left-sided sciatica     Vitamin D deficiency     Atypical mole     Lumbar disc disease with radiculopathy     S/P lumbar fusion     Requires teleSunsea device for the deaf (TTD)     Upper GI bleed - suspected     Tobacco use disorder     History of pseudoseizure     Iliotibial band syndrome affecting lower leg, right     Chondromalacia of right patellofemoral joint     Melanocytic nevus, unspecified location     Dysplastic skin lesion     Patellar maltracking, right     Right anterior knee pain     Hypophosphatemia     Class 1 obesity due to excess calories without serious comorbidity with body mass index (BMI) of 32.0 to 32.9 in adult     Mild intermittent asthma     Cervicalgia     Bulge of cervical disc without myelopathy     Hypertension goal BP (blood pressure) < 130/80     Anal fissure     Low hematocrit     Elevated d-dimer     Abnormal uterine bleeding     Morbid obesity (H)     Abdominal pain     Rectal pain     Past Surgical History:    Procedure Laterality Date     AS REMOVAL OF COCCYX  10/18/2017     C FUSION OF SACROILIAC JOINT  10/26/2018     COLONOSCOPY  7/1/2009    [facility].     COLONOSCOPY N/A 7/17/2019    Procedure: Colonoscopy, With Polypectomy And Biopsy;  Surgeon: Edwin Conde MD;  Location: [facility] OR     COLONOSCOPY WITH CO2 INSUFFLATION N/A 7/17/2019    Procedure: COLONOSCOPY, WITH CO2 INSUFFLATION;  Surgeon: Edwin Conde MD;  Location: [facility]     COMBINED ESOPHAGOSCOPY, GASTROSCOPY, DUODENOSCOPY (EGD) WITH CO2 INSUFFLATION N/A 4/12/2019    Procedure: COMBINED ESOPHAGOSCOPY, GASTROSCOPY, DUODENOSCOPY (EGD) WITH CO2 INSUFFLATION;  Surgeon: Edwin Conde MD;  Location: [facility] OR     ESOPHAGOSCOPY, GASTROSCOPY, DUODENOSCOPY (EGD), COMBINED N/A 4/12/2019    Procedure: Combined Esophagoscopy, Gastroscopy, Duodenoscopy (Egd), Biopsy Single Or Multiple;  Surgeon: Edwin Conde MD;  Location: [facility] OR     FUSION SPINE POSTERIOR MINIMALLY INVASIVE ONE LEVEL N/A 2/23/2017    Procedure: FUSION SPINE POSTERIOR MINIMALLY INVASIVE ONE LEVEL;  L4-5 Oblique Lateral Lumbar Interbody Fusion   Epidural steroid injection.   Transpedicular Bone marrow aspiration;  Surgeon: Jeniffer Eugene MD;  Location: [facility] OR     HC COLONOSCOPY THRU STOMA WITH BIOPSY  10/29/2003    Impression is that of normal appearing colonoscopy, without evidence of rectal bleeding.     HC COLONOSCOPY THRU STOMA, DIAGNOSTIC  10/00    normal     HC COLONOSCOPY THRU STOMA, DIAGNOSTIC  Oct 2009    Dr López- normal     HC EEG AWAKE AND SLEEP      abnormal     HC MRI BRAIN W/O CONTRAST  12/00    normal     HC REMOVAL GALLBLADDER  8/5/2009    [facility].     HC UGI ENDOSCOPY DIAG W BIOPSY  11/11/09    Normal esophagus     HC UGI ENDOSCOPY, SIMPLE EXAM  7/00, 10/00    mild chronic esophagitis and duodenitis, neg H pylori     HC UGI ENDOSCOPY, SIMPLE EXAM  01/20/2005    Esophagogastroduodenoscopy,  colonoscopy with biopsies.  [facility] UGI ENDOSCOPY, SIMPLE EXAM  7/1/2009    [facility] [place]      UGI ENDOSCOPY, SIMPLE EXAM  11/11/2009    attempted upper GI, pt. could not tolerate procedure:[facility]     ORTHOPEDIC SURGERY  October 19,2011    diskectomy L4-L5       Social History     Tobacco Use     Smoking status: Current Every Day Smoker     Packs/day: 0.25     Years: 5.00     Pack years: 1.25     Types: Cigarettes     Smokeless tobacco: Never Used   Substance Use Topics     Alcohol use: Not Currently     Comment: Not since last July 2018     Family History   Problem Relation Age of Onset     Gastrointestinal Disease Brother         severe Crohn's     Neurologic Disorder Brother         Seizures post head injury     Depression Brother      Substance Abuse Brother      Genitourinary Problems Father         kidney stones     Diabetes Father      Heart Disease Father         Open heart surgery     Breast Cancer Maternal Grandmother      Parkinsonism Maternal Grandmother      Cerebrovascular Disease Paternal Grandmother      Cancer Maternal Grandfather         Lung     Cardiovascular Paternal Grandfather         Heart Attack     Substance Abuse Mother         in recovery x1 year      Lung Cancer Paternal Uncle      Cancer Paternal Uncle      Lung Cancer Maternal Uncle          Current Outpatient Medications   Medication Sig Dispense Refill     acetaminophen (TYLENOL) 500 MG tablet Take 2 tablets (1,000 mg) by mouth 3 times daily as needed for mild pain 100 tablet 3     albuterol (PROAIR HFA/PROVENTIL HFA/VENTOLIN HFA) 108 (90 Base) MCG/ACT inhaler Inhale 2 puffs into the lungs every 6 hours as needed for shortness of breath / dyspnea or wheezing 3 Inhaler 3     albuterol (PROVENTIL) (2.5 MG/3ML) 0.083% neb solution Take 1 vial (2.5 mg) by nebulization every 6 hours as needed for shortness of breath / dyspnea or wheezing 50 vial 11     alum & mag  hydroxide-simethicone (MAALOX ADVANCED MAX ST) 400-400-40 MG/5ML SUSP suspension Take 15 mLs by mouth every 4 hours as needed for indigestion (mix with lidocaine) 355 mL 11     ARIPiprazole (ABILIFY) 15 MG tablet Take 1 tablet (15 mg) by mouth At Bedtime 90 tablet 3     aspirin (ASA) 81 MG tablet Take 1 tablet (81 mg) by mouth daily 90 tablet 3     azelastine (OPTIVAR) 0.05 % ophthalmic solution Place 1 drop into both eyes 2 times daily 6 mL 11     buPROPion (WELLBUTRIN XL) 150 MG 24 hr tablet Take 1 tablet (150 mg) by mouth every morning 30 tablet 1     busPIRone (BUSPAR) 15 MG tablet Take 1 tablet (15 mg) by mouth 2 times daily 60 tablet 5     clonazePAM (KLONOPIN) 1 MG tablet Take 1 tablet (1 mg) by mouth 2 times daily as needed for anxiety 28 tablet 0     cyclobenzaprine (FLEXERIL) 10 MG tablet Take 1 tablet (10 mg) by mouth 3 times daily as needed for muscle spasms or other (back pain) 90 tablet 3     diphenhydrAMINE (BENADRYL) 50 MG capsule Take 1-2 capsules ( mg) by mouth every 6 hours as needed for itching, allergies or sleep 90 capsule 3     DULoxetine (CYMBALTA) 60 MG EC capsule Take 1 capsule (60 mg) by mouth daily 90 capsule 3     EPINEPHrine (ANY BX GENERIC EQUIV) 0.3 MG/0.3ML injection 2-pack Inject 0.3 mLs (0.3 mg) into the muscle once as needed for anaphylaxis 0.6 mL 3     fexofenadine (ALLEGRA) 180 MG tablet Take 1 tablet (180 mg) by mouth daily 90 tablet 3     gabapentin (NEURONTIN) 300 MG capsule Take 1 capsule (300 mg) by mouth 3 times daily 270 capsule 3     HYDROmorphone (DILAUDID) 2 MG tablet Take 1 tablet (2 mg) by mouth every 6 hours as needed for pain 10 tablet 0     HYDROmorphone (DILAUDID) 2 MG tablet Take 1 tablet (2 mg) by mouth every 4 hours as needed for moderate to severe pain 12 tablet 0     lidocaine (XYLOCAINE) 2 % solution Swish and swallow 15 mLs in mouth every 4 hours as needed for other (GERD) ; Max 8 doses/24 hour period. Mix with 15 mLs Maalox or Mylanta. 100 mL 3      losartan (COZAAR) 100 MG tablet Take 1 tablet (100 mg) by mouth daily 90 tablet 3     medical cannabis (Patient's own supply) (The purpose of this order is to document that the patient reports taking medical cannabis.  This is not a prescription, and is not used to certify that the patient has a qualifying medical condition.) 0 Information only 0     medroxyPROGESTERone (DEPO-PROVERA) 150 MG/ML IM injection Inject 1 mL (150 mg) into the muscle every 3 months 3 mL 3     methocarbamol (ROBAXIN) 750 MG tablet Take 1 tablet (750 mg) by mouth 3 times daily as needed for muscle spasms 60 tablet 3     metoprolol succinate ER (TOPROL-XL) 200 MG 24 hr tablet Take 1 tablet (200 mg) by mouth daily 90 tablet 1     NONFORMULARY Apply 30 mLs topically 4 times daily       ondansetron (ZOFRAN-ODT) 8 MG ODT tab Take 1 tablet (8 mg) by mouth every 8 hours as needed for nausea 15 tablet 0     pantoprazole (PROTONIX) 40 MG EC tablet Take 1 tablet (40 mg) by mouth 2 times daily Take 30-60 minutes before a meal. 180 tablet 3     polyethylene glycol (MIRALAX) 17 g packet Take 17 g by mouth 2 times daily 60 packet 0     prazosin (MINIPRESS) 1 MG capsule Take 1 mg by mouth At Bedtime  5     prochlorperazine (COMPAZINE) 10 MG tablet Take 1 tablet (10 mg) by mouth every 6 hours as needed for nausea or vomiting 30 tablet 0     rizatriptan (MAXALT-MLT) 5 MG ODT Take 1-2 tablets (5-10 mg) by mouth at onset of headache for migraine May repeat in 2 hours. Max 6 tablets/24 hours. 18 tablet 3     sucralfate (CARAFATE) 1 GM tablet Take 1 tablet (1 g) by mouth 4 times daily as needed (heartburn) 360 tablet 3     valACYclovir (VALTREX) 1000 mg tablet 2 tabs at onset of mouth sores.  Repeat dose in 12 hours.       Allergies   Allergen Reactions     Iohexol Hives     Other reaction(s): Hives  IV contrast; patient states she tolerates contrast if she gets benadryl before  IV contrast; patient states she tolerates contrast if she gets benadryl before      Ativan [Lorazepam] Hives     At the IV site     Baclofen      hives     Bees Hives, Swelling and Difficulty breathing     Caffeine      Contrast Dye      Hives,   Updated 5/10/2016 CT Contrast.     Iodine Hives     Methocarbamol Swelling     Metoclopramide      Other reaction(s): Tremors  LW Reaction: shaking/sweating     Midazolam      Other reaction(s): Agitation     Monosodium Glutamate      Morphine Other (See Comments)     Difficulty with urination     Nsaids Other (See Comments)     GI BLEED x2     Other (Do Not Use) Other (See Comments)     Xanaflex- pt becomes disoriented and loses bladder control     Reglan [Metoclopramide Hcl] Other (See Comments)     shaking     Soma [Carisoprodol] Visual Disturbance     Sleep walking     Tizanidine      Topamax Other (See Comments)     Topamax [Topiramate] Nausea     Tingling  GI/Vomit     Tramadol      Severe Headache, Seizure Risk     Tylenol W/Codeine [Acetaminophen-Codeine] Nausea and Itching     Tylenol 3     Versed Other (See Comments)     Zolmitriptan      Makes face feel like its twitching     Droperidol Anxiety     Flu Virus Vaccine Rash      Arm swelling     Recent Labs   Lab Test 04/19/20  0320 04/18/20  0028 04/11/20  1823 04/04/20  1726  12/31/19  1203  07/18/19  1446  04/03/19  1014  09/13/18  1431  04/25/17  1018   A1C  --   --   --   --   --   --   --   --   --   --   --  5.0  --   --    LDL  --   --   --   --   --   --   --   --   --  112*  --  96  --  130*   HDL  --   --   --   --   --   --   --   --   --  39*  --  40*  --  66   TRIG  --   --   --   --   --   --   --   --   --  59  --  184*  --  58   ALT  --  39 59* 30   < >  --    < > 30   < >  --    < >  --    < >  --    CR 0.88 0.87 0.85 0.90   < >  --    < > 0.85   < >  --    < >  --    < >  --    GFRESTIMATED 87 89 >90 85   < >  --    < > >90   < >  --    < >  --    < >  --    GFRESTBLACK >90 >90 >90 >90   < >  --    < > >90   < >  --    < >  --    < >  --    POTASSIUM 4.0 3.9 3.5 4.2   < > 4.3   <  > 4.0   < >  --    < >  --    < >  --    TSH  --   --   --   --   --  0.86  --  0.44   < >  --   --   --    < >  --     < > = values in this interval not displayed.      BP Readings from Last 3 Encounters:   04/19/20 121/69   04/18/20 134/83   04/16/20 128/80    Wt Readings from Last 3 Encounters:   04/18/20 85.3 kg (188 lb)   04/17/20 87.2 kg (192 lb 4.8 oz)   04/16/20 87.2 kg (192 lb 4.8 oz)                    Reviewed and updated as needed this visit by Provider         Review of Systems   ROS COMP: Constitutional, HEENT, cardiovascular, pulmonary, gi and gu systems are negative, except as otherwise noted.       Objective   Reported vitals:  There were no vitals taken for this visit.   Phone visit conducted with the assistance of   PSYCH: Alert and oriented times 3;  able to articulate logical thoughts, able   to abstract reason, no tangential thoughts, no hallucinations   or delusions  Her affect is normal  Remainder of exam unable to be completed due to telephone visits    Diagnostic Test Results:  Labs reviewed in Epic        ASSESSMENT and PLAN  1. Anal fistula  30-year-old female with history of Crohn's disease with anal fistula, approximately 5 days ago was discharged from hospital after having surgery to decompress abscess.  She has increasing symptoms of the same now.  I called and discussed this with her rectal surgeons team, they are scheduling her for a visit tomorrow.  She was previously prescribed hydromorphone, I have given her a limited supply to get her to her appointment.  Follow-up no improvement or any worsening.  - HYDROmorphone (DILAUDID) 2 MG tablet; Take 1 tablet (2 mg) by mouth every 6 hours as needed for pain  Dispense: 10 tablet; Refill: 0    2. Vaginal discharge  She has noticed some white vaginal discharge that started a few days ago.  Obtaining wet prep.  - Wet prep; Future    3. Crohn's disease of colon with fistula (H)  See above.         Return in  about 3 months (around 7/23/2020) for Annual Well Check.     Karl Boles MD, FAAFP  Family Medicine Physician  [facility] [place]  [id number] [place] [id number]        Phone call duration:  16 minutes    Karl Boles MD

## 2020-04-23 NOTE — TELEPHONE ENCOUNTER
Medications approved.    Karl Boles MD, FAAFP  Family Medicine Physician  [facility] [place]  [address] [address] [place] [address]

## 2020-04-23 NOTE — RESULT ENCOUNTER NOTE
Please advise patient that her wet prep showed bacterial vaginosis.  I have placed a prescription for metronidazole.  She should start that today.  Follow-up no improvement or any worsening.    Karl Boles MD, FAAFP  Family Medicine Physician  [facility] [place]  [address] [place] [address]

## 2020-04-23 NOTE — TELEPHONE ENCOUNTER
Attempted to call no answer. Will try again later.   DELORES Ambriz Shawn, MD Alderman, Shawn, MD               Please advise patient that her wet prep showed bacterial vaginosis.  I have placed a prescription for metronidazole.  She should start that today.  Follow-up no improvement or any worsening.     Karl Boles MD, FAAFP   Family Medicine Physician   [facility] [place]   [id number] [place] [id number]

## 2020-04-23 NOTE — TELEPHONE ENCOUNTER
[facility] Call Center    Phone Message    May a detailed message be left on voicemail: yes     Reason for Call: Other: Pt said she has a sore bottom and that she needs to discuss about her Abscess Please call to discuss  Thank you,    Action Taken: Message routed to:  [facility]al [facility]    Travel Screening: Not Applicable

## 2020-04-24 ENCOUNTER — PATIENT OUTREACH (OUTPATIENT)
Dept: GASTROENTEROLOGY | Facility: CLINIC | Age: 30
End: 2020-04-24

## 2020-04-24 ENCOUNTER — OFFICE VISIT (OUTPATIENT)
Dept: SURGERY | Facility: CLINIC | Age: 30
End: 2020-04-24
Payer: MEDICARE

## 2020-04-24 ENCOUNTER — TELEPHONE (OUTPATIENT)
Dept: FAMILY MEDICINE | Facility: OTHER | Age: 30
End: 2020-04-24

## 2020-04-24 ENCOUNTER — PREP FOR PROCEDURE (OUTPATIENT)
Dept: SURGERY | Facility: CLINIC | Age: 30
End: 2020-04-24

## 2020-04-24 VITALS
TEMPERATURE: 99 F | OXYGEN SATURATION: 98 % | SYSTOLIC BLOOD PRESSURE: 118 MMHG | DIASTOLIC BLOOD PRESSURE: 75 MMHG | HEART RATE: 98 BPM | BODY MASS INDEX: 35.87 KG/M2 | WEIGHT: 194.9 LBS | HEIGHT: 62 IN

## 2020-04-24 DIAGNOSIS — K61.2 ABSCESS OF ANAL AND RECTAL REGIONS: Primary | ICD-10-CM

## 2020-04-24 DIAGNOSIS — K60.30 ANAL FISTULA: Primary | ICD-10-CM

## 2020-04-24 LAB
BACTERIA SPEC CULT: NO GROWTH
BACTERIA SPEC CULT: NO GROWTH
SPECIMEN SOURCE: NORMAL
SPECIMEN SOURCE: NORMAL

## 2020-04-24 ASSESSMENT — PAIN SCALES - GENERAL: PAINLEVEL: SEVERE PAIN (6)

## 2020-04-24 ASSESSMENT — MIFFLIN-ST. JEOR: SCORE: 1552.31

## 2020-04-24 ASSESSMENT — ANXIETY QUESTIONNAIRES: GAD7 TOTAL SCORE: 12

## 2020-04-24 NOTE — LETTER
2020       RE: Katy Islas  [address]  [place] [address]     Dear Colleague,    Thank you for referring your patient, Katy Islas, to the [facility] COLON AND RECTAL SURGERY at [facility]. Please see a copy of my visit note below.    Colon and Rectal Surgery Clinic Note    Date: 2020     Referring provider:  Referred Self, MD  No address on file     RE: Katy Islas  : 1990  TESSA: 2020    Katy Islas is a very pleasant 30 year old female with congenital hearing loss (need ), Crohn's disease, renal disease, GERD, C.difficile, HTN, bipolar disorder, likely borderline personality disorder, fibromyalgia and drug seeking behavior who presents today for anal fissure and perianal abscess..    HPI:  I&D of perianal abscess in the ER on 3/3/20. Was diagnosed with an anal fissure on 3/10/20. Has been seen in multiple ERs in the past month for the abscess/fissure which had been resolving. CT AP 3/20/20 WNL. ER on 2020 and evaluated by our team without any significant findings on exam but she left AMA before repeat CT could be obtained. I saw her one week ago with perianal fistula noted on exam. Recommended conservative management given COVID-19 symptoms and to follow up with GI to ensure that this is not perianal Crohn's that should be medically managed.  She went to the ER twice since that visit with MRI 20 with tiny posterior anal fissure or fistula and no associated fluid collection or abscess.  She presents again today with perianal pain. She states that the pain is so severe that she cannot sit. She feels like something fills up and becomes very painful but then drains and pain is better but still not manageable. She reports low grade fevers at home. She feels that her symptoms are not manageable and she is requesting further intervention. She was recently put on Flagyl for a vaginal infection.  No  "obstetric history but she does report that she had surgery in 2011 for an anal fissure and has some difficulty holding bowel movements.  She reports being diagnosed with Crohn's disease as a teenager. She follows with Dr. Pritchett and Dr. Conde at [facility]. She is not currently on any Crohn's therapy and her last few colonoscopies have been normal. Colonoscopy 7/17/19 was normal. She requested to switch her GI care here to the [facility] and we are working on getting her scheduled to see one of our IBD specialists soon.    Physical Examination:  /75 (BP Location: Left arm, Patient Position: Sitting, Cuff Size: Adult Regular)   Pulse 98   Temp 99  F (37.2  C) (Oral)   Ht 5' 2\"   Wt 194 lb 14.4 oz   SpO2 98%   BMI 35.65 kg/m    General: alert, oriented, in no acute distress, sitting comfortably  HEENT: mucous membranes moist    Perianal external examination: Exam was chaperoned by Chau Edward, EMT   Small opening in the posterior midline that can be gently probed toward the anal canal. Small bruise on the left perianal position  Digital rectal examination: Was deferred  Anoscopy: Was deferred     Assessment/Plan: 30 year old female with Crohn's disease and what appears to be an anal fistula.  Symptoms are consistent with an anal fistula.  She has a remote history of Crohn's disease with several normal colonoscopies recently and she is not currently on any Crohn's management.  I would still like her to follow-up with gastroenterology to ensure that this is not perianal Crohn's disease that could possibly be managed medically.  I again discussed conservative management including warm tub baths and Tylenol for any pain and reassured her that is a good sign that her fistula continues to drain.  However, she states that her symptoms are not manageable and that she needs further intervention.  We discussed examination under anesthesia with possible fistulotomy and possible seton drain " placement.  She would like to proceed with this and I discussed with Dr. Sanchez who is agreeable given her recurrent symptoms and multiple clinic and ER visits for the same problem.  I did discuss with her that this may not resolve her symptoms, however, and she states understanding of this.  We will set her up for an examination under anesthesia with possible fistulotomy possible seton next week.  She asked about getting more pain medication and states that she cannot take Dilaudid because it makes her itchy.  I discussed that this is a side effect of Dilaudid and that I would like her to manage her pain with Tylenol and warm tub baths only.  Patient's questions were answered to her stated satisfaction and she is in agreement with this plan.    Medical history:  Past Medical History:   Diagnosis Date     Abdominal pain 10/31- 11/4/2005    [facility] admit for Crohn's     Allergic rhinitis, cause unspecified     Allergic rhinitis     Arthritis      C. difficile diarrhea     Past, no current diarrhea.     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, without mention of intractable epilepsy     pseudoseizures diagnosed after extensive neurologic eval     Migraine 07/21/12    D/C 07/22/12-[facility]     Migraine, unspecified, without mention of intractable migraine without mention of status migrainosus     Migraine     Mild intermittent asthma     mild intermittent     Mycoplasma infection in conditions classified elsewhere and of unspecified site      Other  chronic pain     Back pain for 6 years     Renal disease     Kidney stones     Unspecified hearing loss     congenital hearing loss       Surgical history:  Past Surgical History:   Procedure Laterality Date     AS REMOVAL OF COCCYX  10/18/2017     C FUSION OF SACROILIAC JOINT  10/26/2018     COLONOSCOPY  7/1/2009    [facility], [facility].     COLONOSCOPY N/A 7/17/2019    Procedure: Colonoscopy, With Polypectomy And Biopsy;  Surgeon: Edwin Conde MD;  Location: [facility] OR     COLONOSCOPY WITH CO2 INSUFFLATION N/A 7/17/2019    Procedure: COLONOSCOPY, WITH CO2 INSUFFLATION;  Surgeon: Edwin Conde MD;  Location: [facility] OR     COMBINED ESOPHAGOSCOPY, GASTROSCOPY, DUODENOSCOPY (EGD) WITH CO2 INSUFFLATION N/A 4/12/2019    Procedure: COMBINED ESOPHAGOSCOPY, GASTROSCOPY, DUODENOSCOPY (EGD) WITH CO2 INSUFFLATION;  Surgeon: Edwin Conde MD;  Location: [facility] OR     ESOPHAGOSCOPY, GASTROSCOPY, DUODENOSCOPY (EGD), COMBINED N/A 4/12/2019    Procedure: Combined Esophagoscopy, Gastroscopy, Duodenoscopy (Egd), Biopsy Single Or Multiple;  Surgeon: Edwin Conde MD;  Location: [facility] OR     FUSION SPINE POSTERIOR MINIMALLY INVASIVE ONE LEVEL N/A 2/23/2017    Procedure: FUSION SPINE POSTERIOR MINIMALLY INVASIVE ONE LEVEL;  L4-5 Oblique Lateral Lumbar Interbody Fusion   Epidural steroid injection.   Transpedicular Bone marrow aspiration;  Surgeon: Jeniffer Eugene MD;  Location: [facility] OR     HC COLONOSCOPY THRU STOMA WITH BIOPSY  10/29/2003    Impression is that of normal appearing colonoscopy, without evidence of rectal bleeding.     HC COLONOSCOPY THRU STOMA, DIAGNOSTIC  10/00    normal     HC COLONOSCOPY THRU STOMA, DIAGNOSTIC  Oct 2009    Dr López- normal     HC EEG AWAKE AND SLEEP      abnormal     HC MRI BRAIN W/O CONTRAST  12/00    normal     HC REMOVAL GALLBLADDER  8/5/2009    [facility], [facility].     HC UGI ENDOSCOPY DIAG W BIOPSY  11/11/09    Normal esophagus     HC  UGI ENDOSCOPY, SIMPLE EXAM  7/00, 10/00    mild chronic esophagitis and duodenitis, neg H pylori      UGI ENDOSCOPY, SIMPLE EXAM  01/20/2005    Esophagogastroduodenoscopy, colonoscopy with biopsies.  [facility] UGI ENDOSCOPY, SIMPLE EXAM  7/1/2009    [facility] [place].      UGI ENDOSCOPY, SIMPLE EXAM  11/11/2009    attempted upper GI, pt. could not tolerate procedure:[facility]     ORTHOPEDIC SURGERY  October 19,2011    diskectomy L4-L5       Problem list:    Patient Active Problem List    Diagnosis Date Noted     Rectal pain 04/19/2020     Priority: Medium     Abdominal pain 04/18/2020     Priority: Medium     Morbid obesity (H) 03/10/2020     Priority: Medium     Abnormal uterine bleeding 02/12/2020     Priority: Medium     Added automatically from request for surgery [id number]       Anal fissure 02/04/2020     Priority: Medium     Low hematocrit 02/04/2020     Priority: Medium     Elevated d-dimer 02/04/2020     Priority: Medium     Hypertension goal BP (blood pressure) < 130/80 08/05/2019     Priority: Medium     Bulge of cervical disc without myelopathy 04/24/2019     Priority: Medium     Cervicalgia 04/17/2019     Priority: Medium     Class 1 obesity due to excess calories without serious comorbidity with body mass index (BMI) of 32.0 to 32.9 in adult 04/03/2019     Priority: Medium     Hypophosphatemia 11/01/2018     Priority: Medium     Patellar maltracking, right 09/05/2018     Priority: Medium     Right anterior knee pain 09/05/2018     Priority: Medium     Melanocytic nevus, unspecified location 07/23/2018     Priority: Medium     Dysplastic skin lesion 07/23/2018     Priority: Medium     Iliotibial band syndrome affecting lower leg, right 12/21/2017     Priority: Medium     Chondromalacia of right patellofemoral joint 12/21/2017     Priority: Medium     Upper GI bleed - suspected 07/01/2017     Priority: Medium     Tobacco use disorder 07/01/2017      Priority: Medium     History of pseudoseizure 07/01/2017     Priority: Medium     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) 03/10/2017     Priority: Medium     Lumbar disc disease with radiculopathy 02/23/2017     Priority: Medium     S/P lumbar fusion 02/23/2017     Priority: Medium     Dr. YOVANI Millan, 2/23/17       Vitamin D deficiency 01/06/2017     Priority: Medium     Atypical mole 01/06/2017     Priority: Medium     Mild persistent asthma without complication 12/02/2016     Priority: Medium     Chronic bilateral low back pain with left-sided sciatica 12/02/2016     Priority: Medium     Bee sting allergy 09/16/2016     Priority: Medium     Gastroesophageal reflux disease without esophagitis 08/26/2016     Priority: Medium     Herpes simplex virus infection 11/12/2015     Priority: Medium     Adjustment disorder with mixed anxiety and depressed mood 10/14/2015     Priority: Medium     Marijuana abuse 02/22/2015     Priority: Medium     Bipolar disorder (H) 01/31/2013     Priority: Medium     Problem list name updated by automated process. Provider to review       Chronic pain 02/09/2012     Priority: Medium     Patient is followed by Aura Rosario PA-C for ongoing prescription of pain medication.  All refills should only be approved by this provider, or covering partner.    Medication(s): Norco    Maximum quantity per month: 70  Clinic visit frequency required: Q 2 months     Controlled substance agreement:   Discussed and signed 4/25/17    Encounter-Level CSA - 01/12/2015:                 Controlled Substance Agreement - Scan on 1/26/2015  9:14 AM : Controlled Medication Agreement-01/12/15 (below)            Pain Clinic evaluation in the past: Yes       Date/Location:   [facility], fall 2016    DIRE Total Score(s):    4/25/2017   Total Score 17       Last [facility] website verification:  done on 4/25/17 by LOVE Maki   [email or web address]           Anxiety 09/22/2011      Priority: Medium     Mild major depression (H) 08/29/2011     Priority: Medium     Mild intermittent asthma 10/12/2009     Priority: Medium     Overview:   Formatting of this note might be different from the original.  Action plan: See letter 4/10/2013   ATAQ:   Asthma Data 4/10/2013   Date completed 4/10/2013   Missed daily activities no = 0   Wake at night no = 0   Believe asthma in control yes = 0   ARIN use yes   Maximum ARIN use in 1 day 1-4 = 0   ATAQ score 0 = well controlled   Asthma ED visits in past 12 mos 0   Asthma hospitalizations in past 12 mos 0     Current Outpatient Prescriptions   Medication Sig     albuterol (PROVENTIL) 0.083 % neb solution Inhale 3 mL via a nebulizer every 4 hours if needed for Shortness Of Breath.        Crohn's colitis (H) 08/04/2009     Priority: Medium     Dysmenorrhea 05/11/2007     Priority: Medium     Benign neoplasm of skin of lower limb, including hip 03/16/2004     Priority: Medium     Migraine      Priority: Medium     Dr. Farrar - Neurology.  Now on Inderal.  Seems to be working.  Follow-up 9/08  Problem list name updated by automated process. Provider to review       Hearing loss      Priority: Medium     Congenital  Problem list name updated by automated process. Provider to review       Allergic rhinitis      Priority: Medium     Problem list name updated by automated process. Provider to review         Medications:  Current Outpatient Medications   Medication Sig Dispense Refill     acetaminophen (TYLENOL) 500 MG tablet Take 2 tablets (1,000 mg) by mouth 3 times daily as needed for mild pain 100 tablet 3     albuterol (PROAIR HFA/PROVENTIL HFA/VENTOLIN HFA) 108 (90 Base) MCG/ACT inhaler Inhale 2 puffs into the lungs every 6 hours as needed for shortness of breath / dyspnea or wheezing 3 Inhaler 3     albuterol (PROVENTIL) (2.5 MG/3ML) 0.083% neb solution Take 1 vial (2.5 mg) by nebulization every 6 hours as needed for shortness of breath / dyspnea or wheezing 50  vial 11     alum & mag hydroxide-simethicone (MAALOX ADVANCED MAX ST) 400-400-40 MG/5ML SUSP suspension Take 15 mLs by mouth every 4 hours as needed for indigestion (mix with lidocaine) 355 mL 11     ARIPiprazole (ABILIFY) 15 MG tablet Take 1 tablet (15 mg) by mouth At Bedtime 90 tablet 3     aspirin (ASA) 81 MG tablet Take 1 tablet (81 mg) by mouth daily 90 tablet 3     azelastine (OPTIVAR) 0.05 % ophthalmic solution Place 1 drop into both eyes 2 times daily 6 mL 11     buPROPion (WELLBUTRIN XL) 150 MG 24 hr tablet Take 1 tablet (150 mg) by mouth every morning 30 tablet 1     busPIRone (BUSPAR) 15 MG tablet Take 1 tablet (15 mg) by mouth 2 times daily 60 tablet 5     clonazePAM (KLONOPIN) 1 MG tablet Take 1 tablet (1 mg) by mouth 2 times daily as needed for anxiety 28 tablet 0     cyclobenzaprine (FLEXERIL) 10 MG tablet Take 1 tablet (10 mg) by mouth 3 times daily as needed for muscle spasms or other (back pain) 90 tablet 3     diphenhydrAMINE (BENADRYL) 50 MG capsule Take 1-2 capsules ( mg) by mouth every 6 hours as needed for itching, allergies or sleep 90 capsule 3     DULoxetine (CYMBALTA) 60 MG EC capsule Take 1 capsule (60 mg) by mouth daily 90 capsule 3     EPINEPHrine (ANY BX GENERIC EQUIV) 0.3 MG/0.3ML injection 2-pack Inject 0.3 mLs (0.3 mg) into the muscle once as needed for anaphylaxis 0.6 mL 3     fexofenadine (ALLEGRA) 180 MG tablet Take 1 tablet (180 mg) by mouth daily 90 tablet 3     gabapentin (NEURONTIN) 300 MG capsule Take 1 capsule (300 mg) by mouth 3 times daily 270 capsule 3     HYDROmorphone (DILAUDID) 2 MG tablet Take 1 tablet (2 mg) by mouth every 6 hours as needed for pain 10 tablet 0     HYDROmorphone (DILAUDID) 2 MG tablet Take 1 tablet (2 mg) by mouth every 4 hours as needed for moderate to severe pain 12 tablet 0     lidocaine (XYLOCAINE) 2 % solution Swish and swallow 15 mLs in mouth every 4 hours as needed for other (GERD) ; Max 8 doses/24 hour period. Mix with 15 mLs Maalox or  Mylanta. 100 mL 3     losartan (COZAAR) 100 MG tablet Take 1 tablet (100 mg) by mouth daily 90 tablet 3     medical cannabis (Patient's own supply) (The purpose of this order is to document that the patient reports taking medical cannabis.  This is not a prescription, and is not used to certify that the patient has a qualifying medical condition.) 0 Information only 0     medroxyPROGESTERone (DEPO-PROVERA) 150 MG/ML IM injection Inject 1 mL (150 mg) into the muscle every 3 months 3 mL 3     methocarbamol (ROBAXIN) 750 MG tablet Take 1 tablet (750 mg) by mouth 3 times daily as needed for muscle spasms 60 tablet 3     metoprolol succinate ER (TOPROL-XL) 200 MG 24 hr tablet Take 1 tablet (200 mg) by mouth daily 90 tablet 1     metroNIDAZOLE (FLAGYL) 500 MG tablet Take 1 tablet (500 mg) by mouth 2 times daily for 7 days 14 tablet 0     NONFORMULARY Apply 30 mLs topically 4 times daily       ondansetron (ZOFRAN-ODT) 8 MG ODT tab Take 1 tablet (8 mg) by mouth every 8 hours as needed for nausea 15 tablet 0     pantoprazole (PROTONIX) 40 MG EC tablet Take 1 tablet (40 mg) by mouth 2 times daily Take 30-60 minutes before a meal. 180 tablet 3     polyethylene glycol (MIRALAX) 17 g packet Take 17 g by mouth 2 times daily 60 packet 0     prazosin (MINIPRESS) 1 MG capsule Take 1 mg by mouth At Bedtime  5     prochlorperazine (COMPAZINE) 10 MG tablet Take 1 tablet (10 mg) by mouth every 6 hours as needed for nausea or vomiting 30 tablet 0     rizatriptan (MAXALT-MLT) 5 MG ODT Take 1-2 tablets (5-10 mg) by mouth at onset of headache for migraine May repeat in 2 hours. Max 6 tablets/24 hours. 18 tablet 3     sucralfate (CARAFATE) 1 GM tablet Take 1 tablet (1 g) by mouth 4 times daily as needed (heartburn) 360 tablet 3     valACYclovir (VALTREX) 1000 mg tablet 2 tabs at onset of mouth sores.  Repeat dose in 12 hours.         Allergies:  Allergies   Allergen Reactions     Iohexol Hives     Other reaction(s): Hives  IV contrast;  patient states she tolerates contrast if she gets benadryl before  IV contrast; patient states she tolerates contrast if she gets benadryl before     Ativan [Lorazepam] Hives     At the IV site     Baclofen      hives     Bees Hives, Swelling and Difficulty breathing     Caffeine      Contrast Dye      Hives,   Updated 5/10/2016 CT Contrast.     Dilaudid [Hydromorphone] Itching     Iodine Hives     Methocarbamol Swelling     Metoclopramide      Other reaction(s): Tremors  LW Reaction: shaking/sweating     Midazolam      Other reaction(s): Agitation     Monosodium Glutamate      Morphine Other (See Comments)     Difficulty with urination     Nsaids Other (See Comments)     GI BLEED x2     Other (Do Not Use) Other (See Comments)     Xanaflex- pt becomes disoriented and loses bladder control     Reglan [Metoclopramide Hcl] Other (See Comments)     shaking     Soma [Carisoprodol] Visual Disturbance     Sleep walking     Tizanidine      Topamax Other (See Comments)     Topamax [Topiramate] Nausea     Tingling  GI/Vomit     Tramadol      Severe Headache, Seizure Risk     Tylenol W/Codeine [Acetaminophen-Codeine] Nausea and Itching     Tylenol 3     Versed Other (See Comments)     Zolmitriptan      Makes face feel like its twitching     Droperidol Anxiety     Flu Virus Vaccine Rash      Arm swelling       Family history:  Family History   Problem Relation Age of Onset     Gastrointestinal Disease Brother         severe Crohn's     Neurologic Disorder Brother         Seizures post head injury     Depression Brother      Substance Abuse Brother      Genitourinary Problems Father         kidney stones     Diabetes Father      Heart Disease Father         Open heart surgery     Breast Cancer Maternal Grandmother      Parkinsonism Maternal Grandmother      Cerebrovascular Disease Paternal Grandmother      Cancer Maternal Grandfather         Lung     Cardiovascular Paternal Grandfather         Heart Attack     Substance Abuse  "Mother         in recovery x1 year      Lung Cancer Paternal Uncle      Cancer Paternal Uncle      Lung Cancer Maternal Uncle        Social history:  Social History     Tobacco Use     Smoking status: Current Every Day Smoker     Packs/day: 0.25     Years: 5.00     Pack years: 1.25     Types: Cigarettes     Smokeless tobacco: Never Used   Substance Use Topics     Alcohol use: Not Currently     Comment: Not since last July 2018    Marital status: single.      Nursing Notes:   Chau Edward EMT  4/24/2020 12:05 PM  Signed  Chief Complaint   Patient presents with     Abscess       Vitals:    04/24/20 1200   BP: 118/75   BP Location: Left arm   Patient Position: Sitting   Cuff Size: Adult Regular   Pulse: 98   Temp: 99  F (37.2  C)   TempSrc: Oral   SpO2: 98%   Weight: 194 lb 14.4 oz   Height: 5' 2\"       Body mass index is 35.65 kg/m .      CELESTINA Murdock            Total face to face time was 25 minutes, >50% counseling.    NATA Blair, NP-C  Colon and Rectal Surgery   [facility]    This note was created using speech recognition software and may contain unintended word substitutions.      Again, thank you for allowing me to participate in the care of your patient.      Sincerely,    NATA Blair CNP      "

## 2020-04-24 NOTE — PROGRESS NOTES
Called and offered the patient and appointment on voicemail.  Patient was left my direct number for call back

## 2020-04-24 NOTE — TELEPHONE ENCOUNTER
[facility] is requesting ok to dispense 90 day supply of buPROPion (WELLBUTRIN XL) 150 MG 24 hr tablet

## 2020-04-24 NOTE — TELEPHONE ENCOUNTER
Reason for call:  Pt is calling because she is not doing well with hydromorphone. She is having an allergic reaction. She was itching for 12 hours after taking it. She tried to take benadryl but that didn't help. She is wanting to see if there is something that can replace it.

## 2020-04-24 NOTE — PROGRESS NOTES
Colon and Rectal Surgery Clinic Note    Date: 2020     Referring provider:  Referred Self, MD  No address on file     RE: Katy Islas  : 1990  TESSA: 2020    Katy Islas is a very pleasant 30 year old female with congenital hearing loss (need ), Crohn's disease, renal disease, GERD, C.difficile, HTN, bipolar disorder, likely borderline personality disorder, fibromyalgia and drug seeking behavior who presents today for anal fissure and perianal abscess..    HPI:  I&D of perianal abscess in the ER on 3/3/20. Was diagnosed with an anal fissure on 3/10/20. Has been seen in multiple ERs in the past month for the abscess/fissure which had been resolving. CT AP 3/20/20 WNL. ER on 2020 and evaluated by our team without any significant findings on exam but she left AMA before repeat CT could be obtained. I saw her one week ago with perianal fistula noted on exam. Recommended conservative management given COVID-19 symptoms and to follow up with GI to ensure that this is not perianal Crohn's that should be medically managed.  She went to the ER twice since that visit with MRI 20 with tiny posterior anal fissure or fistula and no associated fluid collection or abscess.  She presents again today with perianal pain. She states that the pain is so severe that she cannot sit. She feels like something fills up and becomes very painful but then drains and pain is better but still not manageable. She reports low grade fevers at home. She feels that her symptoms are not manageable and she is requesting further intervention. She was recently put on Flagyl for a vaginal infection.  No obstetric history but she does report that she had surgery in  for an anal fissure and has some difficulty holding bowel movements.  She reports being diagnosed with Crohn's disease as a teenager. She follows with Dr. Pritchett and Dr. Conde at [facility]. She is not currently on any  "Crohn's therapy and her last few colonoscopies have been normal. Colonoscopy 7/17/19 was normal. She requested to switch her GI care here to the [place] and we are working on getting her scheduled to see one of our IBD specialists soon.    Physical Examination:  /75 (BP Location: Left arm, Patient Position: Sitting, Cuff Size: Adult Regular)   Pulse 98   Temp 99  F (37.2  C) (Oral)   Ht 5' 2\"   Wt 194 lb 14.4 oz   SpO2 98%   BMI 35.65 kg/m    General: alert, oriented, in no acute distress, sitting comfortably  HEENT: mucous membranes moist    Perianal external examination: Exam was chaperoned by Chau Edward, CELESTINA   Small opening in the posterior midline that can be gently probed toward the anal canal. Small bruise on the left perianal position  Digital rectal examination: Was deferred  Anoscopy: Was deferred     Assessment/Plan: 30 year old female with Crohn's disease and what appears to be an anal fistula.  Symptoms are consistent with an anal fistula.  She has a remote history of Crohn's disease with several normal colonoscopies recently and she is not currently on any Crohn's management.  I would still like her to follow-up with gastroenterology to ensure that this is not perianal Crohn's disease that could possibly be managed medically.  I again discussed conservative management including warm tub baths and Tylenol for any pain and reassured her that is a good sign that her fistula continues to drain.  However, she states that her symptoms are not manageable and that she needs further intervention.  We discussed examination under anesthesia with possible fistulotomy and possible seton drain placement.  She would like to proceed with this and I discussed with Dr. Sanchez who is agreeable given her recurrent symptoms and multiple clinic and ER visits for the same problem.  I did discuss with her that this may not resolve her symptoms, however, and she states understanding of this.  We will set " her up for an examination under anesthesia with possible fistulotomy possible seton next week.  She asked about getting more pain medication and states that she cannot take Dilaudid because it makes her itchy.  I discussed that this is a side effect of Dilaudid and that I would like her to manage her pain with Tylenol and warm tub baths only.  Patient's questions were answered to her stated satisfaction and she is in agreement with this plan.    Medical history:  Past Medical History:   Diagnosis Date     Abdominal pain 10/31- 11/4/2005    [facility] admit for Crohn's     Allergic rhinitis, cause unspecified     Allergic rhinitis     Arthritis      C. difficile diarrhea     Past, no current diarrhea.     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, without mention of intractable epilepsy     pseudoseizures diagnosed after extensive neurologic eval     Migraine 07/21/12    D/C 07/22/12-[facility]     Migraine, unspecified, without mention of intractable migraine without mention of status migrainosus     Migraine     Mild intermittent asthma     mild intermittent     Mycoplasma infection in conditions classified elsewhere and of unspecified site      Other chronic pain     Back pain for 6 years     Renal disease     Kidney stones     Unspecified hearing loss     congenital hearing loss       Surgical history:  Past Surgical History:   Procedure Laterality Date     AS REMOVAL OF COCCYX  10/18/2017     C FUSION OF SACROILIAC JOINT  10/26/2018     COLONOSCOPY   7/1/2009    [facility].     COLONOSCOPY N/A 7/17/2019    Procedure: Colonoscopy, With Polypectomy And Biopsy;  Surgeon: Edwin Conde MD;  Location: [facility] OR     COLONOSCOPY WITH CO2 INSUFFLATION N/A 7/17/2019    Procedure: COLONOSCOPY, WITH CO2 INSUFFLATION;  Surgeon: Edwin Conde MD;  Location: [facility] OR     COMBINED ESOPHAGOSCOPY, GASTROSCOPY, DUODENOSCOPY (EGD) WITH CO2 INSUFFLATION N/A 4/12/2019    Procedure: COMBINED ESOPHAGOSCOPY, GASTROSCOPY, DUODENOSCOPY (EGD) WITH CO2 INSUFFLATION;  Surgeon: Edwin Conde MD;  Location: [facility] OR     ESOPHAGOSCOPY, GASTROSCOPY, DUODENOSCOPY (EGD), COMBINED N/A 4/12/2019    Procedure: Combined Esophagoscopy, Gastroscopy, Duodenoscopy (Egd), Biopsy Single Or Multiple;  Surgeon: Edwin Conde MD;  Location: [facility] OR     FUSION SPINE POSTERIOR MINIMALLY INVASIVE ONE LEVEL N/A 2/23/2017    Procedure: FUSION SPINE POSTERIOR MINIMALLY INVASIVE ONE LEVEL;  L4-5 Oblique Lateral Lumbar Interbody Fusion   Epidural steroid injection.   Transpedicular Bone marrow aspiration;  Surgeon: Jeniffer Eugene MD;  Location: [facility] OR     HC COLONOSCOPY THRU STOMA WITH BIOPSY  10/29/2003    Impression is that of normal appearing colonoscopy, without evidence of rectal bleeding.     HC COLONOSCOPY THRU STOMA, DIAGNOSTIC  10/00    normal     HC COLONOSCOPY THRU STOMA, DIAGNOSTIC  Oct 2009    Dr López- normal     HC EEG AWAKE AND SLEEP      abnormal     HC MRI BRAIN W/O CONTRAST  12/00    normal     HC REMOVAL GALLBLADDER  8/5/2009    [facility].     HC UGI ENDOSCOPY DIAG W BIOPSY  11/11/09    Normal esophagus     HC UGI ENDOSCOPY, SIMPLE EXAM  7/00, 10/00    mild chronic esophagitis and duodenitis, neg H pylori     HC UGI ENDOSCOPY, SIMPLE EXAM  01/20/2005    Esophagogastroduodenoscopy, colonoscopy with biopsies.  [facility] UGI ENDOSCOPY, SIMPLE EXAM  7/1/2009    [facility],  [place].      UGI ENDOSCOPY, SIMPLE EXAM  11/11/2009    attempted upper GI, pt. could not tolerate procedure:[facility]     ORTHOPEDIC SURGERY  October 19,2011    diskectomy L4-L5       Problem list:    Patient Active Problem List    Diagnosis Date Noted     Rectal pain 04/19/2020     Priority: Medium     Abdominal pain 04/18/2020     Priority: Medium     Morbid obesity (H) 03/10/2020     Priority: Medium     Abnormal uterine bleeding 02/12/2020     Priority: Medium     Added automatically from request for surgery [id number]       Anal fissure 02/04/2020     Priority: Medium     Low hematocrit 02/04/2020     Priority: Medium     Elevated d-dimer 02/04/2020     Priority: Medium     Hypertension goal BP (blood pressure) < 130/80 08/05/2019     Priority: Medium     Bulge of cervical disc without myelopathy 04/24/2019     Priority: Medium     Cervicalgia 04/17/2019     Priority: Medium     Class 1 obesity due to excess calories without serious comorbidity with body mass index (BMI) of 32.0 to 32.9 in adult 04/03/2019     Priority: Medium     Hypophosphatemia 11/01/2018     Priority: Medium     Patellar maltracking, right 09/05/2018     Priority: Medium     Right anterior knee pain 09/05/2018     Priority: Medium     Melanocytic nevus, unspecified location 07/23/2018     Priority: Medium     Dysplastic skin lesion 07/23/2018     Priority: Medium     Iliotibial band syndrome affecting lower leg, right 12/21/2017     Priority: Medium     Chondromalacia of right patellofemoral joint 12/21/2017     Priority: Medium     Upper GI bleed - suspected 07/01/2017     Priority: Medium     Tobacco use disorder 07/01/2017     Priority: Medium     History of pseudoseizure 07/01/2017     Priority: Medium     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) 03/10/2017     Priority: Medium     Lumbar disc disease with radiculopathy 02/23/2017     Priority: Medium     S/P lumbar fusion 02/23/2017     Priority: Medium     Dr. PINA  Yana, 2/23/17       Vitamin D deficiency 01/06/2017     Priority: Medium     Atypical mole 01/06/2017     Priority: Medium     Mild persistent asthma without complication 12/02/2016     Priority: Medium     Chronic bilateral low back pain with left-sided sciatica 12/02/2016     Priority: Medium     Bee sting allergy 09/16/2016     Priority: Medium     Gastroesophageal reflux disease without esophagitis 08/26/2016     Priority: Medium     Herpes simplex virus infection 11/12/2015     Priority: Medium     Adjustment disorder with mixed anxiety and depressed mood 10/14/2015     Priority: Medium     Marijuana abuse 02/22/2015     Priority: Medium     Bipolar disorder (H) 01/31/2013     Priority: Medium     Problem list name updated by automated process. Provider to review       Chronic pain 02/09/2012     Priority: Medium     Patient is followed by Aura Rosario PA-C for ongoing prescription of pain medication.  All refills should only be approved by this provider, or covering partner.    Medication(s): Norco    Maximum quantity per month: 70  Clinic visit frequency required: Q 2 months     Controlled substance agreement:   Discussed and signed 4/25/17    Encounter-Level CSA - 01/12/2015:                 Controlled Substance Agreement - Scan on 1/26/2015  9:14 AM : Controlled Medication Agreement-01/12/15 (below)            Pain Clinic evaluation in the past: Yes       Date/Location:   [facility], fall 2016    DIRE Total Score(s):    4/25/2017   Total Score 17       Last [facility] website verification:  done on 4/25/17 by LOVE Maki   [email or web address]           Anxiety 09/22/2011     Priority: Medium     Mild major depression (H) 08/29/2011     Priority: Medium     Mild intermittent asthma 10/12/2009     Priority: Medium     Overview:   Formatting of this note might be different from the original.  Action plan: See letter 4/10/2013   ATAQ:   Asthma Data 4/10/2013   Date completed  4/10/2013   Missed daily activities no = 0   Wake at night no = 0   Believe asthma in control yes = 0   ARIN use yes   Maximum ARIN use in 1 day 1-4 = 0   ATAQ score 0 = well controlled   Asthma ED visits in past 12 mos 0   Asthma hospitalizations in past 12 mos 0     Current Outpatient Prescriptions   Medication Sig     albuterol (PROVENTIL) 0.083 % neb solution Inhale 3 mL via a nebulizer every 4 hours if needed for Shortness Of Breath.        Crohn's colitis (H) 08/04/2009     Priority: Medium     Dysmenorrhea 05/11/2007     Priority: Medium     Benign neoplasm of skin of lower limb, including hip 03/16/2004     Priority: Medium     Migraine      Priority: Medium     Dr. Farrar - Neurology.  Now on Inderal.  Seems to be working.  Follow-up 9/08  Problem list name updated by automated process. Provider to review       Hearing loss      Priority: Medium     Congenital  Problem list name updated by automated process. Provider to review       Allergic rhinitis      Priority: Medium     Problem list name updated by automated process. Provider to review         Medications:  Current Outpatient Medications   Medication Sig Dispense Refill     acetaminophen (TYLENOL) 500 MG tablet Take 2 tablets (1,000 mg) by mouth 3 times daily as needed for mild pain 100 tablet 3     albuterol (PROAIR HFA/PROVENTIL HFA/VENTOLIN HFA) 108 (90 Base) MCG/ACT inhaler Inhale 2 puffs into the lungs every 6 hours as needed for shortness of breath / dyspnea or wheezing 3 Inhaler 3     albuterol (PROVENTIL) (2.5 MG/3ML) 0.083% neb solution Take 1 vial (2.5 mg) by nebulization every 6 hours as needed for shortness of breath / dyspnea or wheezing 50 vial 11     alum & mag hydroxide-simethicone (MAALOX ADVANCED MAX ST) 400-400-40 MG/5ML SUSP suspension Take 15 mLs by mouth every 4 hours as needed for indigestion (mix with lidocaine) 355 mL 11     ARIPiprazole (ABILIFY) 15 MG tablet Take 1 tablet (15 mg) by mouth At Bedtime 90 tablet 3     aspirin  (ASA) 81 MG tablet Take 1 tablet (81 mg) by mouth daily 90 tablet 3     azelastine (OPTIVAR) 0.05 % ophthalmic solution Place 1 drop into both eyes 2 times daily 6 mL 11     buPROPion (WELLBUTRIN XL) 150 MG 24 hr tablet Take 1 tablet (150 mg) by mouth every morning 30 tablet 1     busPIRone (BUSPAR) 15 MG tablet Take 1 tablet (15 mg) by mouth 2 times daily 60 tablet 5     clonazePAM (KLONOPIN) 1 MG tablet Take 1 tablet (1 mg) by mouth 2 times daily as needed for anxiety 28 tablet 0     cyclobenzaprine (FLEXERIL) 10 MG tablet Take 1 tablet (10 mg) by mouth 3 times daily as needed for muscle spasms or other (back pain) 90 tablet 3     diphenhydrAMINE (BENADRYL) 50 MG capsule Take 1-2 capsules ( mg) by mouth every 6 hours as needed for itching, allergies or sleep 90 capsule 3     DULoxetine (CYMBALTA) 60 MG EC capsule Take 1 capsule (60 mg) by mouth daily 90 capsule 3     EPINEPHrine (ANY BX GENERIC EQUIV) 0.3 MG/0.3ML injection 2-pack Inject 0.3 mLs (0.3 mg) into the muscle once as needed for anaphylaxis 0.6 mL 3     fexofenadine (ALLEGRA) 180 MG tablet Take 1 tablet (180 mg) by mouth daily 90 tablet 3     gabapentin (NEURONTIN) 300 MG capsule Take 1 capsule (300 mg) by mouth 3 times daily 270 capsule 3     HYDROmorphone (DILAUDID) 2 MG tablet Take 1 tablet (2 mg) by mouth every 6 hours as needed for pain 10 tablet 0     HYDROmorphone (DILAUDID) 2 MG tablet Take 1 tablet (2 mg) by mouth every 4 hours as needed for moderate to severe pain 12 tablet 0     lidocaine (XYLOCAINE) 2 % solution Swish and swallow 15 mLs in mouth every 4 hours as needed for other (GERD) ; Max 8 doses/24 hour period. Mix with 15 mLs Maalox or Mylanta. 100 mL 3     losartan (COZAAR) 100 MG tablet Take 1 tablet (100 mg) by mouth daily 90 tablet 3     medical cannabis (Patient's own supply) (The purpose of this order is to document that the patient reports taking medical cannabis.  This is not a prescription, and is not used to certify that  the patient has a qualifying medical condition.) 0 Information only 0     medroxyPROGESTERone (DEPO-PROVERA) 150 MG/ML IM injection Inject 1 mL (150 mg) into the muscle every 3 months 3 mL 3     methocarbamol (ROBAXIN) 750 MG tablet Take 1 tablet (750 mg) by mouth 3 times daily as needed for muscle spasms 60 tablet 3     metoprolol succinate ER (TOPROL-XL) 200 MG 24 hr tablet Take 1 tablet (200 mg) by mouth daily 90 tablet 1     metroNIDAZOLE (FLAGYL) 500 MG tablet Take 1 tablet (500 mg) by mouth 2 times daily for 7 days 14 tablet 0     NONFORMULARY Apply 30 mLs topically 4 times daily       ondansetron (ZOFRAN-ODT) 8 MG ODT tab Take 1 tablet (8 mg) by mouth every 8 hours as needed for nausea 15 tablet 0     pantoprazole (PROTONIX) 40 MG EC tablet Take 1 tablet (40 mg) by mouth 2 times daily Take 30-60 minutes before a meal. 180 tablet 3     polyethylene glycol (MIRALAX) 17 g packet Take 17 g by mouth 2 times daily 60 packet 0     prazosin (MINIPRESS) 1 MG capsule Take 1 mg by mouth At Bedtime  5     prochlorperazine (COMPAZINE) 10 MG tablet Take 1 tablet (10 mg) by mouth every 6 hours as needed for nausea or vomiting 30 tablet 0     rizatriptan (MAXALT-MLT) 5 MG ODT Take 1-2 tablets (5-10 mg) by mouth at onset of headache for migraine May repeat in 2 hours. Max 6 tablets/24 hours. 18 tablet 3     sucralfate (CARAFATE) 1 GM tablet Take 1 tablet (1 g) by mouth 4 times daily as needed (heartburn) 360 tablet 3     valACYclovir (VALTREX) 1000 mg tablet 2 tabs at onset of mouth sores.  Repeat dose in 12 hours.         Allergies:  Allergies   Allergen Reactions     Iohexol Hives     Other reaction(s): Hives  IV contrast; patient states she tolerates contrast if she gets benadryl before  IV contrast; patient states she tolerates contrast if she gets benadryl before     Ativan [Lorazepam] Hives     At the IV site     Baclofen      hives     Bees Hives, Swelling and Difficulty breathing     Caffeine      Contrast Dye       Hives,   Updated 5/10/2016 CT Contrast.     Dilaudid [Hydromorphone] Itching     Iodine Hives     Methocarbamol Swelling     Metoclopramide      Other reaction(s): Tremors  LW Reaction: shaking/sweating     Midazolam      Other reaction(s): Agitation     Monosodium Glutamate      Morphine Other (See Comments)     Difficulty with urination     Nsaids Other (See Comments)     GI BLEED x2     Other (Do Not Use) Other (See Comments)     Xanaflex- pt becomes disoriented and loses bladder control     Reglan [Metoclopramide Hcl] Other (See Comments)     shaking     Soma [Carisoprodol] Visual Disturbance     Sleep walking     Tizanidine      Topamax Other (See Comments)     Topamax [Topiramate] Nausea     Tingling  GI/Vomit     Tramadol      Severe Headache, Seizure Risk     Tylenol W/Codeine [Acetaminophen-Codeine] Nausea and Itching     Tylenol 3     Versed Other (See Comments)     Zolmitriptan      Makes face feel like its twitching     Droperidol Anxiety     Flu Virus Vaccine Rash      Arm swelling       Family history:  Family History   Problem Relation Age of Onset     Gastrointestinal Disease Brother         severe Crohn's     Neurologic Disorder Brother         Seizures post head injury     Depression Brother      Substance Abuse Brother      Genitourinary Problems Father         kidney stones     Diabetes Father      Heart Disease Father         Open heart surgery     Breast Cancer Maternal Grandmother      Parkinsonism Maternal Grandmother      Cerebrovascular Disease Paternal Grandmother      Cancer Maternal Grandfather         Lung     Cardiovascular Paternal Grandfather         Heart Attack     Substance Abuse Mother         in recovery x1 year      Lung Cancer Paternal Uncle      Cancer Paternal Uncle      Lung Cancer Maternal Uncle        Social history:  Social History     Tobacco Use     Smoking status: Current Every Day Smoker     Packs/day: 0.25     Years: 5.00     Pack years: 1.25     Types: Cigarettes  "    Smokeless tobacco: Never Used   Substance Use Topics     Alcohol use: Not Currently     Comment: Not since last July 2018    Marital status: single.      Nursing Notes:   Chau Edward EMT  4/24/2020 12:05 PM  Signed  Chief Complaint   Patient presents with     Abscess       Vitals:    04/24/20 1200   BP: 118/75   BP Location: Left arm   Patient Position: Sitting   Cuff Size: Adult Regular   Pulse: 98   Temp: 99  F (37.2  C)   TempSrc: Oral   SpO2: 98%   Weight: 194 lb 14.4 oz   Height: 5' 2\"       Body mass index is 35.65 kg/m .      CELESTINA Murdock                         Total face to face time was 25 minutes, >50% counseling.    NATA Blair, NP-C  Colon and Rectal Surgery   [facility]    This note was created using speech recognition software and may contain unintended word substitutions.    "

## 2020-04-24 NOTE — TELEPHONE ENCOUNTER
Please advise patient to stop the hydromorphone.  I reviewed her surgeon's note, he would like her to manage pain with Tylenol and warm baths.  Given patient's allergies, unable to use other opioids.    Karl Boles MD, FAAFP  Family Medicine Physician  [facility]  [address] [place] [address]

## 2020-04-24 NOTE — NURSING NOTE
"Chief Complaint   Patient presents with     Abscess       Vitals:    04/24/20 1200   BP: 118/75   BP Location: Left arm   Patient Position: Sitting   Cuff Size: Adult Regular   Pulse: 98   Temp: 99  F (37.2  C)   TempSrc: Oral   SpO2: 98%   Weight: 194 lb 14.4 oz   Height: 5' 2\"       Body mass index is 35.65 kg/m .      Chau Edward, EMT                      "

## 2020-04-27 ENCOUNTER — TELEPHONE (OUTPATIENT)
Dept: SURGERY | Facility: CLINIC | Age: 30
End: 2020-04-27

## 2020-04-27 ENCOUNTER — HOSPITAL ENCOUNTER (EMERGENCY)
Facility: CLINIC | Age: 30
Discharge: HOME | End: 2020-04-27
Attending: EMERGENCY MEDICINE | Admitting: EMERGENCY MEDICINE
Payer: MEDICARE

## 2020-04-27 ENCOUNTER — NURSE TRIAGE (OUTPATIENT)
Dept: FAMILY MEDICINE | Facility: OTHER | Age: 30
End: 2020-04-27

## 2020-04-27 VITALS
OXYGEN SATURATION: 92 % | DIASTOLIC BLOOD PRESSURE: 79 MMHG | TEMPERATURE: 97.3 F | SYSTOLIC BLOOD PRESSURE: 117 MMHG | WEIGHT: 190 LBS | HEART RATE: 95 BPM | BODY MASS INDEX: 34.75 KG/M2 | RESPIRATION RATE: 13 BRPM

## 2020-04-27 DIAGNOSIS — F31.63 BIPOLAR DISORDER, CURRENT EPISODE MIXED, SEVERE, WITHOUT PSYCHOTIC FEATURES (H): ICD-10-CM

## 2020-04-27 DIAGNOSIS — F43.23 ADJUSTMENT DISORDER WITH MIXED ANXIETY AND DEPRESSED MOOD: ICD-10-CM

## 2020-04-27 DIAGNOSIS — F32.0 MILD MAJOR DEPRESSION (H): ICD-10-CM

## 2020-04-27 DIAGNOSIS — K60.30 ANAL FISTULA: ICD-10-CM

## 2020-04-27 PROBLEM — K61.2 ABSCESS OF ANAL AND RECTAL REGIONS: Status: ACTIVE | Noted: 2020-04-27

## 2020-04-27 LAB
ALBUMIN UR-MCNC: NEGATIVE MG/DL
APPEARANCE UR: CLEAR
BILIRUB UR QL STRIP: NEGATIVE
COLOR UR AUTO: YELLOW
GLUCOSE UR STRIP-MCNC: NEGATIVE MG/DL
HGB UR QL STRIP: NEGATIVE
INTERPRETATION ECG - MUSE: NORMAL
KETONES UR STRIP-MCNC: 5 MG/DL
LEUKOCYTE ESTERASE UR QL STRIP: ABNORMAL
MUCOUS THREADS #/AREA URNS LPF: PRESENT /LPF
NITRATE UR QL: NEGATIVE
PH UR STRIP: 6 PH (ref 5–7)
RBC #/AREA URNS AUTO: 1 /HPF (ref 0–2)
SOURCE: ABNORMAL
SP GR UR STRIP: 1.03 (ref 1–1.03)
SQUAMOUS #/AREA URNS AUTO: 2 /HPF (ref 0–1)
UROBILINOGEN UR STRIP-MCNC: NORMAL MG/DL (ref 0–2)
WBC #/AREA URNS AUTO: 4 /HPF (ref 0–5)

## 2020-04-27 PROCEDURE — 25800030 ZZH RX IP 258 OP 636: Performed by: EMERGENCY MEDICINE

## 2020-04-27 PROCEDURE — 81001 URINALYSIS AUTO W/SCOPE: CPT | Performed by: EMERGENCY MEDICINE

## 2020-04-27 PROCEDURE — 93010 ELECTROCARDIOGRAM REPORT: CPT | Mod: Z6 | Performed by: EMERGENCY MEDICINE

## 2020-04-27 PROCEDURE — 25000128 H RX IP 250 OP 636: Performed by: EMERGENCY MEDICINE

## 2020-04-27 PROCEDURE — 40000556 ZZH STATISTIC PERIPHERAL IV START W US GUIDANCE

## 2020-04-27 PROCEDURE — 96374 THER/PROPH/DIAG INJ IV PUSH: CPT | Performed by: EMERGENCY MEDICINE

## 2020-04-27 PROCEDURE — 99285 EMERGENCY DEPT VISIT HI MDM: CPT | Mod: 25 | Performed by: EMERGENCY MEDICINE

## 2020-04-27 PROCEDURE — 96375 TX/PRO/DX INJ NEW DRUG ADDON: CPT | Performed by: EMERGENCY MEDICINE

## 2020-04-27 PROCEDURE — 93005 ELECTROCARDIOGRAM TRACING: CPT | Performed by: EMERGENCY MEDICINE

## 2020-04-27 RX ORDER — OXYCODONE AND ACETAMINOPHEN 5; 325 MG/1; MG/1
1-2 TABLET ORAL EVERY 4 HOURS PRN
Qty: 12 TABLET | Refills: 0 | Status: SHIPPED | OUTPATIENT
Start: 2020-04-27 | End: 2020-10-08

## 2020-04-27 RX ORDER — HYDROMORPHONE HYDROCHLORIDE 1 MG/ML
0.5 INJECTION, SOLUTION INTRAMUSCULAR; INTRAVENOUS; SUBCUTANEOUS ONCE
Status: DISCONTINUED | OUTPATIENT
Start: 2020-04-27 | End: 2020-04-28 | Stop reason: HOSPADM

## 2020-04-27 RX ORDER — ONDANSETRON 2 MG/ML
4 INJECTION INTRAMUSCULAR; INTRAVENOUS ONCE
Status: COMPLETED | OUTPATIENT
Start: 2020-04-27 | End: 2020-04-27

## 2020-04-27 RX ORDER — LIDOCAINE 40 MG/G
CREAM TOPICAL
Status: CANCELLED | OUTPATIENT
Start: 2020-04-27

## 2020-04-27 RX ORDER — HYDROMORPHONE HYDROCHLORIDE 1 MG/ML
1 INJECTION, SOLUTION INTRAMUSCULAR; INTRAVENOUS; SUBCUTANEOUS ONCE
Status: COMPLETED | OUTPATIENT
Start: 2020-04-27 | End: 2020-04-27

## 2020-04-27 RX ORDER — DIPHENHYDRAMINE HYDROCHLORIDE 50 MG/ML
25 INJECTION INTRAMUSCULAR; INTRAVENOUS ONCE
Status: COMPLETED | OUTPATIENT
Start: 2020-04-27 | End: 2020-04-27

## 2020-04-27 RX ORDER — METHYLPREDNISOLONE SODIUM SUCCINATE 40 MG/ML
40 INJECTION, POWDER, LYOPHILIZED, FOR SOLUTION INTRAMUSCULAR; INTRAVENOUS EVERY 4 HOURS
Status: CANCELLED | OUTPATIENT
Start: 2020-04-27 | End: 2020-04-28

## 2020-04-27 RX ORDER — DIPHENHYDRAMINE HYDROCHLORIDE 50 MG/ML
50 INJECTION INTRAMUSCULAR; INTRAVENOUS ONCE
Status: CANCELLED | OUTPATIENT
Start: 2020-04-28 | End: 2020-04-28

## 2020-04-27 RX ADMIN — DIPHENHYDRAMINE HYDROCHLORIDE 25 MG: 50 INJECTION, SOLUTION INTRAMUSCULAR; INTRAVENOUS at 21:19

## 2020-04-27 RX ADMIN — ONDANSETRON 4 MG: 2 INJECTION INTRAMUSCULAR; INTRAVENOUS at 20:51

## 2020-04-27 RX ADMIN — SODIUM CHLORIDE 1000 ML: 9 INJECTION, SOLUTION INTRAVENOUS at 20:52

## 2020-04-27 RX ADMIN — HYDROMORPHONE HYDROCHLORIDE 1 MG: 1 INJECTION, SOLUTION INTRAMUSCULAR; INTRAVENOUS; SUBCUTANEOUS at 20:51

## 2020-04-27 ASSESSMENT — ENCOUNTER SYMPTOMS
COUGH: 0
CHILLS: 0
SHORTNESS OF BREATH: 0
FEVER: 0

## 2020-04-27 NOTE — TELEPHONE ENCOUNTER
Spoke with patient through interrupter services.  Scheduled for surgery for anal fistula.  She has concern as the skin is purple and pink.  It stopped draining on Friday.  Looks like a fluid filled blister or pocket/sac. About a quarter size.   Looks like a bruising with spider webs in it.   Overall has been draining off and on for about 2 months.  Tends to pop every few days.  Has tried baths up to 4 times daily.    No fever.  No cough.  Also having some low back pain.      Will route to provider for review and advise if there was anything that we could do in clinic for her.  Patient would like it drained if possible.  It does appear that there is a message out to colorectal team as well.      Iggy Bernstein, RN, BSN      Additional Information    Negative: Sounds like a life-threatening emergency to the triager    Negative: Diarrhea is the main symptom    Negative: Constipation is the main symptom (e.g., pain or discomfort caused by passage of hard BMs)    Negative: Blood in or on bowel movement is the main symptom    Negative: Sexual assault    Negative: Injury to rectum    Negative: Patient sounds very sick or weak to the triager    Negative: Severe rectal pain    Negative: Rectal pain or redness and fever > 100.5 F (38.1 C)    Negative: Acute onset rectal pain and constipation (straining with rectal pressure or fullness), which is not relieved by Sitz bath or suppository    MODERATE-SEVERE rectal pain (i.e., interferes with school, work, or sleep)    Protocols used: RECTAL SYMPTOMS-A-OH

## 2020-04-27 NOTE — TELEPHONE ENCOUNTER
----- Message from Katy Griggs RN sent at 4/27/2020  9:35 AM CDT -----  Regarding: RE: Surgery request  I will let him know to update H&P.    Thanks  ----- Message -----  From: Sonya Turpin  Sent: 4/27/2020   9:19 AM CDT  To: Cristina Villarreal, RN, #  Subject: RE: Surgery request                              I can add the patient onto Thursday 04/30/2020.Will [facility] update H&P?    Sonya Magallanes  ----- Message -----  From: Juana Smith  Sent: 4/27/2020   7:47 AM CDT  To: Sonya Turpin  Subject: FW: Surgery request                                ----- Message -----  From: Katy Griggs RN  Sent: 4/24/2020  12:50 PM CDT  To: Cristina Villarreal, RN, #  Subject: Surgery request                                  Hey April,    Can you get this pt scheduled for surgery urgent. Case request in. I texted the surgeon to double sign.    Katy Magallanes

## 2020-04-27 NOTE — TELEPHONE ENCOUNTER
Reason for call:  Patient reporting a symptom    Symptom or request: Anal fistula not draining, turning purple and pussing    Duration (how long have symptoms been present): 3 days    Have you been treated for this before? No    Additional comments: Patient wants to be seen today. Not sure if this is considered urgent; sending to triage.     Phone Number patient can be reached at:  Home number on file [phone] (home)    Best Time:  any    Can we leave a detailed message on this number:  YES    Call taken on 4/27/2020 at 10:41 AM by Genie Luo

## 2020-04-27 NOTE — ED AVS SNAPSHOT
[facility], [place], Emergency Department  [address] [address]  Phone:  [phone]                                    Katy Islas   MRN: [id number]    Department:  [facility], Emergency Department   Date of Visit:  4/27/2020           After Visit Summary Signature Page    I have received my discharge instructions, and my questions have been answered. I have discussed any challenges I see with this plan with the nurse or doctor.    ..........................................................................................................................................  Patient/Patient Representative Signature      ..........................................................................................................................................  Patient Representative Print Name and Relationship to Patient    ..................................................               ................................................  Date                                   Time    ..........................................................................................................................................  Reviewed by Signature/Title    ...................................................              ..............................................  Date                                               Time          22EPIC Rev 08/18

## 2020-04-27 NOTE — TELEPHONE ENCOUNTER
Spoke with pt regarding pain on her buttock. I started I would follow up with the NP. I also told her she needs to confirm her appt with GI on Wednesday. She stated understanding.

## 2020-04-27 NOTE — TELEPHONE ENCOUNTER
Patient is scheduled for surgery with Dr. Jamal Sanchez      Spoke with: Katy through an     Date of Surgery: 04/30/2020    Location: [address] [address]  3rd floor- 3C    Informed patient they will need an adult  Yes    Pre-op with surgeon (if applicable): Not required    H&P: Dr. Sanchez will update H&P day of surgery.    Additional imaging/appointments: Not required    Surgery packet: [facility] DChart per patient request     Additional comments: Patient has concerns about waiting until Thursday for surgery as she is experincing symptoms. This writer sent a message to care team with this concern and asked they call the patient. Post op has been scheduled on 05/04/2020 per patient request.

## 2020-04-27 NOTE — TELEPHONE ENCOUNTER
----- Message from Juana Smith sent at 4/27/2020  7:47 AM CDT -----  Regarding: FW: Surgery request    ----- Message -----  From: Katy Griggs RN  Sent: 4/24/2020  12:50 PM CDT  To: Cristina Villarreal RN, #  Subject: Surgery request                                  Hey April,    Can you get this pt scheduled for surgery urgent. Case request in. I texted the surgeon to double sign.    Thanks,   Katy

## 2020-04-27 NOTE — TELEPHONE ENCOUNTER
Reason for call:  Patient reporting a symptom    Symptom or request: Patient feels her vagina is swollen and purple. She said it is painful    Duration (how long have symptoms been present): ongoing    Have you been treated for this before? Yes    Additional comments:     Phone Number patient can be reached at:  Home number on file [phone] (home)    Best Time:      Can we leave a detailed message on this number:  NO    Call taken on 4/27/2020 at 5:02 PM by Atiya Borrego

## 2020-04-27 NOTE — TELEPHONE ENCOUNTER
Recommend she talk to her surgeon about this - if needs to drain or can just manage until surgery     Zach Rosario PA-C   [facility]

## 2020-04-27 NOTE — TELEPHONE ENCOUNTER
Spoke with NP about pain on her buttock. NP stated to use tylenol and sitz baths till Thursday procedure. Pt stated understanding. Pt informed nurse that her temp was 100 last night. I started I would like the NP know and if the plan changes I will contact the pt. She stated understanding.

## 2020-04-27 NOTE — TELEPHONE ENCOUNTER
LM with interpretor to have pt call back, when call is returned give information below.    Deborah Bradley CMA ([facility])

## 2020-04-27 NOTE — TELEPHONE ENCOUNTER
[facility] Call Center    Phone Message    May a detailed message be left on voicemail: yes     Reason for Call: Other: .  pt is requesting a call back - notified pt of my chart message.    Action Taken: Message routed to:  [facility] colon/rectal    Travel Screening: Not Applicable

## 2020-04-27 NOTE — TELEPHONE ENCOUNTER
Spoke to patient via relay service.  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 Feels like a large pimple is now on her L side of vaginal opening.  5-6/10 pain  No drainage  Hard to visualize the area, but it looks purplish-red  Denies fever    Recommended seeking UC tonight if pain is unbearable or schedule office visit for an evaluation for tomorrow. Continue usual pain management until she is seen.    She agrees with this plan and has no questions.    Marianna Layne, EDYTAN, RN, PHN

## 2020-04-27 NOTE — TELEPHONE ENCOUNTER
Patient told this writer surgery would be this week per Dr. Valentin Sanchez. This writer asked patient about pre-op physical and she is not sure if Dr. Sanchez will update H&P day of surgery. This writer let her know that she can expect a call back to discuss. A message regarding this concern has been sent to the care team.

## 2020-04-28 ENCOUNTER — PATIENT OUTREACH (OUTPATIENT)
Dept: GASTROENTEROLOGY | Facility: CLINIC | Age: 30
End: 2020-04-28

## 2020-04-28 ENCOUNTER — PATIENT OUTREACH (OUTPATIENT)
Dept: CARE COORDINATION | Facility: CLINIC | Age: 30
End: 2020-04-28

## 2020-04-28 RX ORDER — CLONAZEPAM 1 MG/1
1 TABLET ORAL 2 TIMES DAILY PRN
Qty: 28 TABLET | Refills: 0 | Status: SHIPPED | OUTPATIENT
Start: 2020-04-28 | End: 2020-05-07

## 2020-04-28 NOTE — CONSULTS
[facility] Surgery Consultation    Katy Islas MRN# [id number]   Age: 30 year old YOB: 1990     Date of Admission:  4/27/2020    Date of Consult:   4/27/20    Reason for consult: Anal and vaginal pain        Requesting service: Emergency Department                    Assessment and Plan:   Assessment:   Katy Kilpatrick is a 30 year old female w/ congential heraing loss, Crohn's disease w/ anal fissure (2011), GERD, h/o c diff, HTN, bipolar disorder & borderline personality disorder, fibromyalgia and drug seeking behavior presents with increased anal and vaginal discomfort. Exam benign though with slightly tachycardia. Patient left the ED prior to labs drawn or CT completed.         Plan:   -- No urgent need for surgery   -- If tachycardia resolves, okay to discharge from colorectal standpoint with plan to return for scheduled EUA with Dr. Sanchez on 4/30  -- If tachycardia persists or labs abnormal, please obtain CT abdomen and pelvis and call surgical resident back     Discussed with colorectal fellow     Sheryl Alcala MD PGY2            Chief Complaint:     Admitting Diagnosis: No diagnosis found.         History of Present Illness:   Katy Kilpatrick is a 30 year old female w/ congential heraing loss, Crohn's disease w/ anal fissure (2011), GERD, h/o c diff, HTN, bipolar disorder & borderline personality disorder, fibromyalgia and drug seeking behavior presents with anal and vaginal discomfort. The patient is well known to the colorectal service for h/o Crohn's. Most recently, the patient was seen in the ED on 3/3/20 for perianal abscess s/p I&D diagnosed with an anal fissue 3/10/20. She subsequently had multiple ED visits for management. An MRI 4/17/20 demonstrated a tiny posterior anal fissure or fistula without associated fluid collection or abscess. Last clinic appointment w/ colorectal team was 4/24/20 with continued perianal pain and low grade fevers. She had been on flagyl for a  vaginal infection. Patient was set up with EUA on 4/30 with Dr. Sanchez. In the meantime, she was instructed to use acetaminophen, dilaudid PRN, and warm tub baths. Patient reports several days of increased rectal pain with bowel movements and vaginal soreness on the lateral aspect of her vulva. Patient has been taking flagyl without relief. No specific drainage or abnormal vaginal discharge. She occasionally has bloody stool but this has been typical in the last several months. Last bowel movement this AM. She ran out of dilaudid yesterday and has not found relief with acetaminophen or warm baths. No fever, chills, nausea, vomiting, or issues with bowels. In ED, she is slightly tachycardic despite taking her home HTN medications including metoprolol.     In regards to Crohn's, the patient was diagnosed as a teenager and follows with Dr. Pritchett and Dr Conde at [facility]. She is not on any Crohn therapy and last few colonoscopies have been normal, last occurring 7/2019.          Past Medical History:     Past Medical History:   Diagnosis Date     Abdominal pain 10/31- 11/4/2005    [facility] admit for Crohn's     Allergic rhinitis, cause unspecified     Allergic rhinitis     Arthritis      C. difficile diarrhea     Past, no current diarrhea.     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, without mention of intractable epilepsy     pseudoseizures diagnosed after extensive neurologic eval     Migraine 07/21/12    D/C 07/22/12-[facility]     Migraine,  unspecified, without mention of intractable migraine without mention of status migrainosus     Migraine     Mild intermittent asthma     mild intermittent     Mycoplasma infection in conditions classified elsewhere and of unspecified site      Other chronic pain     Back pain for 6 years     Renal disease     Kidney stones     Unspecified hearing loss     congenital hearing loss             Past Surgical History:     Past Surgical History:   Procedure Laterality Date     AS REMOVAL OF COCCYX  10/18/2017     C FUSION OF SACROILIAC JOINT  10/26/2018     COLONOSCOPY  7/1/2009    [facility], [facility].     COLONOSCOPY N/A 7/17/2019    Procedure: Colonoscopy, With Polypectomy And Biopsy;  Surgeon: Edwin Conde MD;  Location: [facility] OR     COLONOSCOPY WITH CO2 INSUFFLATION N/A 7/17/2019    Procedure: COLONOSCOPY, WITH CO2 INSUFFLATION;  Surgeon: Edwin Conde MD;  Location: [facility]     COMBINED ESOPHAGOSCOPY, GASTROSCOPY, DUODENOSCOPY (EGD) WITH CO2 INSUFFLATION N/A 4/12/2019    Procedure: COMBINED ESOPHAGOSCOPY, GASTROSCOPY, DUODENOSCOPY (EGD) WITH CO2 INSUFFLATION;  Surgeon: Edwin Conde MD;  Location: [facility] OR     ESOPHAGOSCOPY, GASTROSCOPY, DUODENOSCOPY (EGD), COMBINED N/A 4/12/2019    Procedure: Combined Esophagoscopy, Gastroscopy, Duodenoscopy (Egd), Biopsy Single Or Multiple;  Surgeon: Edwin Conde MD;  Location: [facility] OR     FUSION SPINE POSTERIOR MINIMALLY INVASIVE ONE LEVEL N/A 2/23/2017    Procedure: FUSION SPINE POSTERIOR MINIMALLY INVASIVE ONE LEVEL;  L4-5 Oblique Lateral Lumbar Interbody Fusion   Epidural steroid injection.   Transpedicular Bone marrow aspiration;  Surgeon: Jeniffer Eugene MD;  Location: [facility] OR     HC COLONOSCOPY THRU STOMA WITH BIOPSY  10/29/2003    Impression is that of normal appearing colonoscopy, without evidence of rectal bleeding.     HC COLONOSCOPY THRU STOMA, DIAGNOSTIC  10/00    normal     HC COLONOSCOPY THRU STOMA, DIAGNOSTIC   Oct 2009    Dr López- normal     HC EEG AWAKE AND SLEEP      abnormal     HC MRI BRAIN W/O CONTRAST  12/00    normal     HC REMOVAL GALLBLADDER  8/5/2009    [facility], [place].     HC UGI ENDOSCOPY DIAG W BIOPSY  11/11/09    Normal esophagus     HC UGI ENDOSCOPY, SIMPLE EXAM  7/00, 10/00    mild chronic esophagitis and duodenitis, neg H pylori     HC UGI ENDOSCOPY, SIMPLE EXAM  01/20/2005    Esophagogastroduodenoscopy, colonoscopy with biopsies.  [facility]     HC UGI ENDOSCOPY, SIMPLE EXAM  7/1/2009    [facility], [facility].     HC UGI ENDOSCOPY, SIMPLE EXAM  11/11/2009    attempted upper GI, pt. could not tolerate procedure:[facility]     ORTHOPEDIC SURGERY  October 19,2011    diskectomy L4-L5             Social History:     Social History     Tobacco Use     Smoking status: Current Every Day Smoker     Packs/day: 0.25     Years: 5.00     Pack years: 1.25     Types: Cigarettes     Smokeless tobacco: Never Used   Substance Use Topics     Alcohol use: Not Currently     Comment: Not since last July 2018             Family History:     Family History   Problem Relation Age of Onset     Gastrointestinal Disease Brother         severe Crohn's     Neurologic Disorder Brother         Seizures post head injury     Depression Brother      Substance Abuse Brother      Genitourinary Problems Father         kidney stones     Diabetes Father      Heart Disease Father         Open heart surgery     Breast Cancer Maternal Grandmother      Parkinsonism Maternal Grandmother      Cerebrovascular Disease Paternal Grandmother      Cancer Maternal Grandfather         Lung     Cardiovascular Paternal Grandfather         Heart Attack     Substance Abuse Mother         in recovery x1 year      Lung Cancer Paternal Uncle      Cancer Paternal Uncle      Lung Cancer Maternal Uncle                 Allergies:     Allergies   Allergen Reactions     Iohexol Hives     Other reaction(s):  Hives  IV contrast; patient states she tolerates contrast if she gets benadryl before  IV contrast; patient states she tolerates contrast if she gets benadryl before     Ativan [Lorazepam] Hives     At the IV site     Baclofen      hives     Bees Hives, Swelling and Difficulty breathing     Caffeine      Contrast Dye      Hives,   Updated 5/10/2016 CT Contrast.     Dilaudid [Hydromorphone] Itching     Iodine Hives     Methocarbamol Swelling     Metoclopramide      Other reaction(s): Tremors  LW Reaction: shaking/sweating     Midazolam      Other reaction(s): Agitation     Monosodium Glutamate      Morphine Other (See Comments)     Difficulty with urination     Nsaids Other (See Comments)     GI BLEED x2     Other (Do Not Use) Other (See Comments)     Xanaflex- pt becomes disoriented and loses bladder control     Reglan [Metoclopramide Hcl] Other (See Comments)     shaking     Soma [Carisoprodol] Visual Disturbance     Sleep walking     Tizanidine      Topamax Other (See Comments)     Topamax [Topiramate] Nausea     Tingling  GI/Vomit     Tramadol      Severe Headache, Seizure Risk     Tylenol W/Codeine [Acetaminophen-Codeine] Nausea and Itching     Tylenol 3     Versed Other (See Comments)     Zolmitriptan      Makes face feel like its twitching     Droperidol Anxiety     Flu Virus Vaccine Rash      Arm swelling             Medications:     Current Facility-Administered Medications   Medication     0.9% sodium chloride BOLUS     HYDROmorphone (PF) (DILAUDID) injection 1 mg     medroxyPROGESTERone (DEPO-PROVERA) injection 150 mg     medroxyPROGESTERone (DEPO-PROVERA) injection 150 mg     medroxyPROGESTERone (DEPO-PROVERA) syringe 150 mg     ondansetron (ZOFRAN) injection 4 mg     Current Outpatient Medications   Medication Sig     acetaminophen (TYLENOL) 500 MG tablet Take 2 tablets (1,000 mg) by mouth 3 times daily as needed for mild pain     albuterol (PROAIR HFA/PROVENTIL HFA/VENTOLIN HFA) 108 (90 Base) MCG/ACT  inhaler Inhale 2 puffs into the lungs every 6 hours as needed for shortness of breath / dyspnea or wheezing     albuterol (PROVENTIL) (2.5 MG/3ML) 0.083% neb solution Take 1 vial (2.5 mg) by nebulization every 6 hours as needed for shortness of breath / dyspnea or wheezing     alum & mag hydroxide-simethicone (MAALOX ADVANCED MAX ST) 400-400-40 MG/5ML SUSP suspension Take 15 mLs by mouth every 4 hours as needed for indigestion (mix with lidocaine)     ARIPiprazole (ABILIFY) 15 MG tablet Take 1 tablet (15 mg) by mouth At Bedtime     aspirin (ASA) 81 MG tablet Take 1 tablet (81 mg) by mouth daily     azelastine (OPTIVAR) 0.05 % ophthalmic solution Place 1 drop into both eyes 2 times daily     buPROPion (WELLBUTRIN XL) 150 MG 24 hr tablet Take 1 tablet (150 mg) by mouth every morning     busPIRone (BUSPAR) 15 MG tablet Take 1 tablet (15 mg) by mouth 2 times daily     clonazePAM (KLONOPIN) 1 MG tablet Take 1 tablet (1 mg) by mouth 2 times daily as needed for anxiety     cyclobenzaprine (FLEXERIL) 10 MG tablet Take 1 tablet (10 mg) by mouth 3 times daily as needed for muscle spasms or other (back pain)     diphenhydrAMINE (BENADRYL) 50 MG capsule Take 1-2 capsules ( mg) by mouth every 6 hours as needed for itching, allergies or sleep     DULoxetine (CYMBALTA) 60 MG EC capsule Take 1 capsule (60 mg) by mouth daily     EPINEPHrine (ANY BX GENERIC EQUIV) 0.3 MG/0.3ML injection 2-pack Inject 0.3 mLs (0.3 mg) into the muscle once as needed for anaphylaxis     fexofenadine (ALLEGRA) 180 MG tablet Take 1 tablet (180 mg) by mouth daily     gabapentin (NEURONTIN) 300 MG capsule Take 1 capsule (300 mg) by mouth 3 times daily     HYDROmorphone (DILAUDID) 2 MG tablet Take 1 tablet (2 mg) by mouth every 4 hours as needed for moderate to severe pain     lidocaine (XYLOCAINE) 2 % solution Swish and swallow 15 mLs in mouth every 4 hours as needed for other (GERD) ; Max 8 doses/24 hour period. Mix with 15 mLs Maalox or Mylanta.      losartan (COZAAR) 100 MG tablet Take 1 tablet (100 mg) by mouth daily     medical cannabis (Patient's own supply) (The purpose of this order is to document that the patient reports taking medical cannabis.  This is not a prescription, and is not used to certify that the patient has a qualifying medical condition.)     medroxyPROGESTERone (DEPO-PROVERA) 150 MG/ML IM injection Inject 1 mL (150 mg) into the muscle every 3 months     methocarbamol (ROBAXIN) 750 MG tablet Take 1 tablet (750 mg) by mouth 3 times daily as needed for muscle spasms     metoprolol succinate ER (TOPROL-XL) 200 MG 24 hr tablet Take 1 tablet (200 mg) by mouth daily     metroNIDAZOLE (FLAGYL) 500 MG tablet Take 1 tablet (500 mg) by mouth 2 times daily for 7 days     NONFORMULARY Apply 30 mLs topically 4 times daily     ondansetron (ZOFRAN-ODT) 8 MG ODT tab Take 1 tablet (8 mg) by mouth every 8 hours as needed for nausea     pantoprazole (PROTONIX) 40 MG EC tablet Take 1 tablet (40 mg) by mouth 2 times daily Take 30-60 minutes before a meal.     polyethylene glycol (MIRALAX) 17 g packet Take 17 g by mouth 2 times daily     prazosin (MINIPRESS) 1 MG capsule Take 1 mg by mouth At Bedtime     prochlorperazine (COMPAZINE) 10 MG tablet Take 1 tablet (10 mg) by mouth every 6 hours as needed for nausea or vomiting     rizatriptan (MAXALT-MLT) 5 MG ODT Take 1-2 tablets (5-10 mg) by mouth at onset of headache for migraine May repeat in 2 hours. Max 6 tablets/24 hours.     sucralfate (CARAFATE) 1 GM tablet Take 1 tablet (1 g) by mouth 4 times daily as needed (heartburn)     valACYclovir (VALTREX) 1000 mg tablet 2 tabs at onset of mouth sores.  Repeat dose in 12 hours.               Review of Systems:   Negative other than HPI          Physical Exam:   All vitals have been reviewed  Temp:  [96.7  F (35.9  C)] 96.7  F (35.9  C)  Heart Rate:  [136] 136  Resp:  [18] 18  BP: (129)/(91) 129/91  SpO2:  [100 %] 100 %  No intake or output data in the 24 hours ending  04/27/20 1956    Physical Examination:   Vital Signs: BP (!) 129/91   Temp 96.7  F (35.9  C) (Oral)   Resp 18   Wt 86.2 kg (190 lb)   SpO2 100%   BMI 34.75 kg/m    GEN: NAD, using the ipad for sign language   EENT: normal, wearing glasses and mask   Chest: NLB on RA, tachycardic (98 - 112 during interview)   Abdomen: soft, nontender, nondistended  Rectal exam: No surrounding erythema or fluctuance. No skin changes. ABDI without abnormalities. No blood on glove. The vaginal and labia without erythema or skin changes. Pelvic exam deferred.   Extremities: WWP, no edema          Data:   All laboratory data reviewed    Labs/Imaging/Other:  Labs and CT image ordered but not obtained

## 2020-04-28 NOTE — ED PROVIDER NOTES
"ED Provider Note  [facility]      History     Chief Complaint   Patient presents with     Groin Swelling     HPI  Katy Islas is a 30 year old female whose pertinent pmshx = Crohn's (not managed at this time), ano-rectal fistula, h/o vaginal infections, deaf, here w/ one day of fistula pain and vaginal pain. On Thursday was started on metronidazzle for vaginal pain (e-visit, no exam was done) and patient states some discharge from her vagina began on that day. Fistula \"closed\" today and has a sense of \"fullness\" behind it. Vaginal pain is inside and on the left, not externally. No dysuria or urinary frequency. Has sx scheduled on 4/30/2020 for exam under anesthesia by general surgery.    Past Medical History  Past Medical History:   Diagnosis Date     Abdominal pain 10/31- 11/4/2005    [facility] admit [facility]     Allergic rhinitis, cause unspecified     Allergic rhinitis     Arthritis      C. difficile diarrhea     Past, no current diarrhea.     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, without mention of intractable epilepsy     pseudoseizures diagnosed after extensive neurologic eval     Migraine 07/21/12    D/C 07/22/12-[facility]     Migraine, unspecified, without mention of intractable migraine without mention of status migrainosus     Migraine     Mild intermittent asthma     mild intermittent     Mycoplasma infection in conditions classified elsewhere and of unspecified site      Other chronic pain     Back pain " for 6 years     Renal disease     Kidney stones     Unspecified hearing loss     congenital hearing loss     Past Surgical History:   Procedure Laterality Date     AS REMOVAL OF COCCYX  10/18/2017     C FUSION OF SACROILIAC JOINT  10/26/2018     COL[facility]OSCOPY  7/1/2009    [facility].     COLONOSCOPY N/A 7/17/2019    Procedure: Colonoscopy, With Polypectomy And Biopsy;  Surgeon: Edwin Conde MD;  Location: [facility]     COLONOSCOPY WITH CO2 INSUFFLATION N/A 7/17/2019    Procedure: COLONOSCOPY, WITH CO2 INSUFFLATION;  Surgeon: Edwin Conde MD;  Location: [facility]     COMBINED ESOPHAGOSCOPY, GASTROSCOPY, DUODENOSCOPY (EGD) WITH CO2 INSUFFLATION N/A 4/12/2019    Procedure: COMBINED ESOPHAGOSCOPY, GASTROSCOPY, DUODENOSCOPY (EGD) WITH CO2 INSUFFLATION;  Surgeon: Edwin Conde MD;  Location: [facility]     ESOPHAGOSCOPY, GASTROSCOPY, DUODENOSCOPY (EGD), COMBINED N/A 4/12/2019    Procedure: Combined Esophagoscopy, Gastroscopy, Duodenoscopy (Egd), Biopsy Single Or Multiple;  Surgeon: Edwin Conde MD;  Location: [facility] OR     FUSION SPINE POSTERIOR MINIMALLY INVASIVE ONE LEVEL N/A 2/23/2017    Procedure: FUSION SPINE POSTERIOR MINIMALLY INVASIVE ONE LEVEL;  L4-5 Oblique Lateral Lumbar Interbody Fusion   Epidural steroid injection.   Transpedicular Bone marrow aspiration;  Surgeon: Jeniffer Eugene MD;  Location: [facility] OR     HC COLONOSCOPY THRU STOMA WITH BIOPSY  10/29/2003    Impression is that of normal appearing colonoscopy, without evidence of rectal bleeding.     HC COLONOSCOPY THRU STOMA, DIAGNOSTIC  10/00    normal     HC COLONOSCOPY THRU STOMA, DIAGNOSTIC  Oct 2009    Dr Lóepz- normal     HC EEG AWAKE AND SLEEP      abnormal     HC MRI BRAIN W/O CONTRAST  12/00    normal     HC REMOVAL GALLBLADDER  8/5/2009    [facility].     HC UGI ENDOSCOPY DIAG W BIOPSY  11/11/09    Normal esophagus     HC UGI ENDOSCOPY, SIMPLE EXAM  7/00, 10/00    mild  chronic esophagitis and duodenitis, neg H pylori      UGI ENDOSCOPY, SIMPLE EXAM  01/20/2005    Esophagogastroduodenoscopy, colonoscopy with biopsies.  [facility] UGI ENDOSCOPY, SIMPLE EXAM  7/1/2009    [facility] [place]      UGI ENDOSCOPY, SIMPLE EXAM  11/11/2009    attempted upper GI, pt. could not tolerate procedure:[facility]     ORTHOPEDIC SURGERY  October 19,2011    diskectomy L4-L5     oxyCODONE-acetaminophen (PERCOCET) 5-325 MG tablet  acetaminophen (TYLENOL) 500 MG tablet  albuterol (PROAIR HFA/PROVENTIL HFA/VENTOLIN HFA) 108 (90 Base) MCG/ACT inhaler  albuterol (PROVENTIL) (2.5 MG/3ML) 0.083% neb solution  alum & mag hydroxide-simethicone (MAALOX ADVANCED MAX ST) 400-400-40 MG/5ML SUSP suspension  ARIPiprazole (ABILIFY) 15 MG tablet  aspirin (ASA) 81 MG tablet  azelastine (OPTIVAR) 0.05 % ophthalmic solution  buPROPion (WELLBUTRIN XL) 150 MG 24 hr tablet  busPIRone (BUSPAR) 15 MG tablet  clonazePAM (KLONOPIN) 1 MG tablet  cyclobenzaprine (FLEXERIL) 10 MG tablet  diphenhydrAMINE (BENADRYL) 50 MG capsule  DULoxetine (CYMBALTA) 60 MG EC capsule  EPINEPHrine (ANY BX GENERIC EQUIV) 0.3 MG/0.3ML injection 2-pack  fexofenadine (ALLEGRA) 180 MG tablet  gabapentin (NEURONTIN) 300 MG capsule  HYDROmorphone (DILAUDID) 2 MG tablet  lidocaine (XYLOCAINE) 2 % solution  losartan (COZAAR) 100 MG tablet  medical cannabis (Patient's own supply)  medroxyPROGESTERone (DEPO-PROVERA) 150 MG/ML IM injection  methocarbamol (ROBAXIN) 750 MG tablet  metoprolol succinate ER (TOPROL-XL) 200 MG 24 hr tablet  metroNIDAZOLE (FLAGYL) 500 MG tablet  NONFORMULARY  ondansetron (ZOFRAN-ODT) 8 MG ODT tab  pantoprazole (PROTONIX) 40 MG EC tablet  polyethylene glycol (MIRALAX) 17 g packet  prazosin (MINIPRESS) 1 MG capsule  prochlorperazine (COMPAZINE) 10 MG tablet  rizatriptan (MAXALT-MLT) 5 MG ODT  sucralfate (CARAFATE) 1 GM tablet  valACYclovir (VALTREX) 1000 mg tablet      Allergies    Allergen Reactions     Iohexol Hives     Other reaction(s): Hives  IV contrast; patient states she tolerates contrast if she gets benadryl before  IV contrast; patient states she tolerates contrast if she gets benadryl before     Ativan [Lorazepam] Hives     At the IV site     Baclofen      hives     Bees Hives, Swelling and Difficulty breathing     Caffeine      Contrast Dye      Hives,   Updated 5/10/2016 CT Contrast.     Dilaudid [Hydromorphone] Itching     Iodine Hives     Methocarbamol Swelling     Metoclopramide      Other reaction(s): Tremors  LW Reaction: shaking/sweating     Midazolam      Other reaction(s): Agitation     Monosodium Glutamate      Morphine Other (See Comments)     Difficulty with urination     Nsaids Other (See Comments)     GI BLEED x2     Other (Do Not Use) Other (See Comments)     Xanaflex- pt becomes disoriented and loses bladder control     Reglan [Metoclopramide Hcl] Other (See Comments)     shaking     Soma [Carisoprodol] Visual Disturbance     Sleep walking     Tizanidine      Topamax Other (See Comments)     Topamax [Topiramate] Nausea     Tingling  GI/Vomit     Tramadol      Severe Headache, Seizure Risk     Tylenol W/Codeine [Acetaminophen-Codeine] Nausea and Itching     Tylenol 3     Versed Other (See Comments)     Zolmitriptan      Makes face feel like its twitching     Droperidol Anxiety     Flu Virus Vaccine Rash      Arm swelling     Past medical history, past surgical history, medications, and allergies were reviewed with the patient. Additional pertinent items: None    Family History  Family History   Problem Relation Age of Onset     Gastrointestinal Disease Brother         severe Crohn's     Neurologic Disorder Brother         Seizures post head injury     Depression Brother      Substance Abuse Brother      Genitourinary Problems Father         kidney stones     Diabetes Father      Heart Disease Father         Open heart surgery     Breast Cancer Maternal Grandmother       Parkinsonism Maternal Grandmother      Cerebrovascular Disease Paternal Grandmother      Cancer Maternal Grandfather         Lung     Cardiovascular Paternal Grandfather         Heart Attack     Substance Abuse Mother         in recovery x1 year      Lung Cancer Paternal Uncle      Cancer Paternal Uncle      Lung Cancer Maternal Uncle      Family history was reviewed with the patient. Additional pertinent items: None    Social History  Social History     Tobacco Use     Smoking status: Current Every Day Smoker     Packs/day: 0.25     Years: 5.00     Pack years: 1.25     Types: Cigarettes     Smokeless tobacco: Never Used   Substance Use Topics     Alcohol use: Not Currently     Comment: Not since last July 2018     Drug use: Yes     Types: Marijuana     Comment: Medical Marijuana currently-- More CBD      Social history was reviewed with the patient. Additional pertinent items: None    Review of Systems   Constitutional: Negative for chills and fever.   Respiratory: Negative for cough and shortness of breath.    All other systems reviewed and are negative.    A complete review of systems was performed with pertinent positives and negatives noted in the HPI, and all other systems negative.    Physical Exam   BP: (!) 129/91  Pulse: 112  Heart Rate: 136  Temp: 96.7  F (35.9  C)  Resp: 18  Weight: 86.2 kg (190 lb)  SpO2: 100 %  Physical Exam  Constitutional:       General: She is not in acute distress.     Appearance: She is not diaphoretic.   HENT:      Head: Atraumatic.      Mouth/Throat:      Pharynx: No oropharyngeal exudate.   Eyes:      General: No scleral icterus.     Pupils: Pupils are equal, round, and reactive to light.   Cardiovascular:      Rate and Rhythm: Regular rhythm. Tachycardia present.      Heart sounds: Normal heart sounds.   Pulmonary:      Effort: No respiratory distress.      Breath sounds: Normal breath sounds.   Abdominal:      General: Bowel sounds are normal.      Palpations: Abdomen is  soft.      Tenderness: There is no abdominal tenderness.   Genitourinary:     General: Normal vulva.      Vagina: No vaginal discharge.      Comments: ? Closed fistula at 6 o'clock position, no significant tenderness to inside of vagina, no abscess noted outward  Musculoskeletal:         General: No tenderness.   Skin:     General: Skin is warm.      Findings: No rash.         ED Course      Procedures             EKG Interpretation:      Interpreted by Trent Gallegos MD  Time reviewed: 2007  Symptoms at time of EKG: vaginal pain   Rhythm: normal sinus   Rate: normal  Axis: normal  Ectopy: none  Conduction: normal  ST Segments/ T Waves: No ST-T wave changes  Q Waves: none  Comparison to prior: Unchanged    Clinical Impression: normal EKG                      Results for orders placed or performed during the hospital encounter of 04/27/20   UA reflex to Microscopic and Culture     Status: Abnormal    Specimen: Urine clean catch   Result Value Ref Range    Color Urine Yellow     Appearance Urine Clear     Glucose Urine Negative NEG^Negative mg/dL    Bilirubin Urine Negative NEG^Negative    Ketones Urine 5 (A) NEG^Negative mg/dL    Specific Gravity Urine 1.031 1.003 - 1.035    Blood Urine Negative NEG^Negative    pH Urine 6.0 5.0 - 7.0 pH    Protein Albumin Urine Negative NEG^Negative mg/dL    Urobilinogen mg/dL Normal 0.0 - 2.0 mg/dL    Nitrite Urine Negative NEG^Negative    Leukocyte Esterase Urine Small (A) NEG^Negative    Source Clean catch urine     RBC Urine 1 0 - 2 /HPF    WBC Urine 4 0 - 5 /HPF    Squamous Epithelial /HPF Urine 2 (H) 0 - 1 /HPF    Mucous Urine Present (A) NEG^Negative /LPF   EKG 12 lead     Status: None   Result Value Ref Range    Interpretation ECG Click View Image link to view waveform and result    Surgery General Adult IP Consult: Patient to be seen: Routine within 24 hrs; Call back #: [id number]; fistula pain has sx on 4/30; Consultant may enter orders: Yes; Requesting provider? Attending  physician     Status: None ()    Annia Marino MD     4/27/2020 10:47 PM  [facility] Consultation    Katy Islas MRN# [id number]   Age: 30 year old YOB: 1990     Date of Admission:  4/27/2020    Date of Consult:   4/27/20    Reason for consult: Anal and vaginal pain        Requesting service: Emergency Department                    Assessment and Plan:   Assessment:   Katy Kilpatrick is a 30 year old female w/ congential heraing   loss, Crohn's disease w/ anal fissure (2011), GERD, h/o c diff,   HTN, bipolar disorder & borderline personality disorder,   fibromyalgia and drug seeking behavior presents with increased   anal and vaginal discomfort. Exam benign though with slightly   tachycardia. Patient left the ED prior to labs drawn or CT   completed.         Plan:   -- No urgent need for surgery   -- If tachycardia resolves, okay to discharge from colorectal   standpoint with plan to return for scheduled EUA with Dr. Sanchez on 4/30  -- If tachycardia persists or labs abnormal, please obtain CT   abdomen and pelvis and call surgical resident back     Discussed with colorectal fellow     Sheryl Alcala MD PGY2            Chief Complaint:     Admitting Diagnosis: No diagnosis found.         History of Present Illness:   Katy Kilpatrick is a 30 year old female w/ congential heraing   loss, Crohn's disease w/ anal fissure (2011), GERD, h/o c diff,   HTN, bipolar disorder & borderline personality disorder,   fibromyalgia and drug seeking behavior presents with anal and   vaginal discomfort. The patient is well known to the colorectal   service for h/o Crohn's. Most recently, the patient was seen in   the ED on 3/3/20 for perianal abscess s/p I&D diagnosed with an   anal fissue 3/10/20. She subsequently had multiple ED visits for   management. An MRI 4/17/20 demonstrated a tiny posterior anal   fissure or fistula without associated fluid collection or   abscess. Last  clinic appointment w/ colorectal team was 4/24/20   with continued perianal pain and low grade fevers. She had been   on flagyl for a vaginal infection. Patient was set up with EUA on   4/30 with Dr. Sanchez. In the meantime, she was instructed to   use acetaminophen, dilaudid PRN, and warm tub baths. Patient   reports several days of increased rectal pain with bowel   movements and vaginal soreness on the lateral aspect of her   vulva. Patient has been taking flagyl without relief. No specific   drainage or abnormal vaginal discharge. She occasionally has   bloody stool but this has been typical in the last several   months. Last bowel movement this AM. She ran out of dilaudid   yesterday and has not found relief with acetaminophen or warm   baths. No fever, chills, nausea, vomiting, or issues with bowels.   In ED, she is slightly tachycardic despite taking her home HTN   medications including metoprolol.     In regards to Crohn's, the patient was diagnosed as a teenager   and follows with Dr. Pritchett and Dr Conde at [facility]. She is not   on any Crohn therapy and last few colonoscopies have been normal,   last occurring 7/2019.          Past Medical History:     Past Medical History:   Diagnosis Date     Abdominal pain 10/31- 11/4/2005    [facility] admit for [facility]     Allergic rhinitis, cause unspecified     Allergic rhinitis     Arthritis      C. difficile diarrhea     Past, no current diarrhea.     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 neurologic eval     Migraine 07/21/12    D/C 07/22/12-[facility]     Migraine, unspecified, without mention of intractable migraine   without mention of status migrainosus     Migraine     Mild intermittent asthma     mild intermittent     Mycoplasma infection in conditions classified elsewhere and of   unspecified site      Other chronic pain     Back pain for 6 years     Renal disease     Kidney stones     Unspecified hearing loss     congenital hearing loss             Past Surgical History:     Past Surgical History:   Procedure Laterality Date     AS REMOVAL OF COCCYX  10/18/2017     C FUSION OF SACROILIAC JOINT  10/26/2018     COLONOSCOPY  7/1/2009    [facility].     COLONOSCOPY N/A 7/17/2019    Procedure: Colonoscopy, With Polypectomy And Biopsy;  Surgeon:   Edwin Conde MD;  Location: [facility]     COLONOSCOPY WITH CO2 INSUFFLATION N/A 7/17/2019    Procedure: COLONOSCOPY, WITH CO2 INSUFFLATION;  Surgeon:   Edwin Conde MD;  Location: [facility]     COMBINED ESOPHAGOSCOPY, GASTROSCOPY, DUODENOSCOPY (EGD) WITH   CO2 INSUFFLATION N/A 4/12/2019    Procedure: COMBINED ESOPHAGOSCOPY, GASTROSCOPY, DUODENOSCOPY   (EGD) WITH CO2 INSUFFLATION;  Surgeon: Edwin Conde MD;  Location: [facility]     ESOPHAGOSCOPY, GASTROSCOPY, DUODENOSCOPY (EGD), COMBINED N/A   4/12/2019    Procedure: Combined Esophagoscopy, Gastroscopy, Duodenoscopy   (Egd), Biopsy Single Or Multiple;  Surgeon: Edwin Conde MD;  Location: [facility]     FUSION SPINE POSTERIOR MINIMALLY INVASIVE ONE LEVEL N/A   2/23/2017    Procedure: FUSION SPINE POSTERIOR MINIMALLY INVASIVE ONE LEVEL;    L4-5 Oblique Lateral Lumbar Interbody Fusion   Epidural steroid injection.   Transpedicular Bone marrow aspiration;  Surgeon: Jeniffer Eugene MD;  Location: [facility] OR     HC COLONOSCOPY THRU STOMA WITH BIOPSY  10/29/2003     Impression is that of normal appearing colonoscopy, without   evidence of rectal bleeding.     HC COLONOSCOPY THRU STOMA, DIAGNOSTIC  10/00    normal     HC COLONOSCOPY THRU STOMA, DIAGNOSTIC  Oct 2009    Dr López- normal     HC EEG AWAKE AND SLEEP      abnormal     HC MRI BRAIN W/O CONTRAST  12/00    normal     HC REMOVAL GALLBLADDER  8/5/2009    [facility] [place]     HC UGI ENDOSCOPY DIAG W BIOPSY  11/11/09    Normal esophagus     HC UGI ENDOSCOPY, SIMPLE EXAM  7/00, 10/00    mild chronic esophagitis and duodenitis, neg H pylori     HC UGI ENDOSCOPY, SIMPLE EXAM  01/20/2005    Esophagogastroduodenoscopy, colonoscopy with biopsies.    [facility]     HC UGI ENDOSCOPY, SIMPLE EXAM  7/1/2009    [facility].      UGI ENDOSCOPY, SIMPLE EXAM  11/11/2009    attempted upper GI, pt. could not tolerate procedure:[facility]     ORTHOPEDIC SURGERY  October 19,2011    diskectomy L4-L5             Social History:     Social History     Tobacco Use     Smoking status: Current Every Day Smoker     Packs/day: 0.25     Years: 5.00     Pack years: 1.25     Types: Cigarettes     Smokeless tobacco: Never Used   Substance Use Topics     Alcohol use: Not Currently     Comment: Not since last July 2018             Family History:     Family History   Problem Relation Age of Onset     Gastrointestinal Disease Brother         severe Crohn's     Neurologic Disorder Brother         Seizures post head injury     Depression Brother      Substance Abuse Brother      Genitourinary Problems Father         kidney stones     Diabetes Father      Heart Disease Father         Open heart surgery     Breast Cancer Maternal Grandmother      Parkinsonism Maternal Grandmother      Cerebrovascular Disease Paternal Grandmother      Cancer Maternal Grandfather         Lung     Cardiovascular Paternal Grandfather         Heart Attack     Substance Abuse Mother         in recovery x1 year       Lung Cancer Paternal Uncle      Cancer Paternal Uncle      Lung Cancer Maternal Uncle                 Allergies:     Allergies   Allergen Reactions     Iohexol Hives     Other reaction(s): Hives  IV contrast; patient states she tolerates contrast if she gets   benadryl before  IV contrast; patient states she tolerates contrast if she gets   benadryl before     Ativan [Lorazepam] Hives     At the IV site     Baclofen      hives     Bees Hives, Swelling and Difficulty breathing     Caffeine      Contrast Dye      Hives,   Updated 5/10/2016 CT Contrast.     Dilaudid [Hydromorphone] Itching     Iodine Hives     Methocarbamol Swelling     Metoclopramide      Other reaction(s): Tremors  LW Reaction: shaking/sweating     Midazolam      Other reaction(s): Agitation     Monosodium Glutamate      Morphine Other (See Comments)     Difficulty with urination     Nsaids Other (See Comments)     GI BLEED x2     Other (Do Not Use) Other (See Comments)     Xanaflex- pt becomes disoriented and loses bladder control     Reglan [Metoclopramide Hcl] Other (See Comments)     shaking     Soma [Carisoprodol] Visual Disturbance     Sleep walking     Tizanidine      Topamax Other (See Comments)     Topamax [Topiramate] Nausea     Tingling  GI/Vomit     Tramadol      Severe Headache, Seizure Risk     Tylenol W/Codeine [Acetaminophen-Codeine] Nausea and Itching     Tylenol 3     Versed Other (See Comments)     Zolmitriptan      Makes face feel like its twitching     Droperidol Anxiety     Flu Virus Vaccine Rash      Arm swelling             Medications:     Current Facility-Administered Medications   Medication     0.9% sodium chloride BOLUS     HYDROmorphone (PF) (DILAUDID) injection 1 mg     medroxyPROGESTERone (DEPO-PROVERA) injection 150 mg     medroxyPROGESTERone (DEPO-PROVERA) injection 150 mg     medroxyPROGESTERone (DEPO-PROVERA) syringe 150 mg     ondansetron (ZOFRAN) injection 4 mg     Current Outpatient Medications   Medication Sig      acetaminophen (TYLENOL) 500 MG tablet Take 2 tablets (1,000 mg)   by mouth 3 times daily as needed for mild pain     albuterol (PROAIR HFA/PROVENTIL HFA/VENTOLIN HFA) 108 (90 Base)   MCG/ACT inhaler Inhale 2 puffs into the lungs every 6 hours as   needed for shortness of breath / dyspnea or wheezing     albuterol (PROVENTIL) (2.5 MG/3ML) 0.083% neb solution Take 1   vial (2.5 mg) by nebulization every 6 hours as needed for   shortness of breath / dyspnea or wheezing     alum & mag hydroxide-simethicone (MAALOX ADVANCED MAX ST)   400-400-40 MG/5ML SUSP suspension Take 15 mLs by mouth every 4   hours as needed for indigestion (mix with lidocaine)     ARIPiprazole (ABILIFY) 15 MG tablet Take 1 tablet (15 mg) by   mouth At Bedtime     aspirin (ASA) 81 MG tablet Take 1 tablet (81 mg) by mouth daily       azelastine (OPTIVAR) 0.05 % ophthalmic solution Place 1 drop   into both eyes 2 times daily     buPROPion (WELLBUTRIN XL) 150 MG 24 hr tablet Take 1 tablet   (150 mg) by mouth every morning     busPIRone (BUSPAR) 15 MG tablet Take 1 tablet (15 mg) by mouth   2 times daily     clonazePAM (KLONOPIN) 1 MG tablet Take 1 tablet (1 mg) by mouth   2 times daily as needed for anxiety     cyclobenzaprine (FLEXERIL) 10 MG tablet Take 1 tablet (10 mg)   by mouth 3 times daily as needed for muscle spasms or other (back   pain)     diphenhydrAMINE (BENADRYL) 50 MG capsule Take 1-2 capsules   ( mg) by mouth every 6 hours as needed for itching,   allergies or sleep     DULoxetine (CYMBALTA) 60 MG EC capsule Take 1 capsule (60 mg)   by mouth daily     EPINEPHrine (ANY BX GENERIC EQUIV) 0.3 MG/0.3ML injection   2-pack Inject 0.3 mLs (0.3 mg) into the muscle once as needed for   anaphylaxis     fexofenadine (ALLEGRA) 180 MG tablet Take 1 tablet (180 mg) by   mouth daily     gabapentin (NEURONTIN) 300 MG capsule Take 1 capsule (300 mg)   by mouth 3 times daily     HYDROmorphone (DILAUDID) 2 MG tablet Take 1 tablet (2 mg) by    mouth every 4 hours as needed for moderate to severe pain     lidocaine (XYLOCAINE) 2 % solution Swish and swallow 15 mLs in   mouth every 4 hours as needed for other (GERD) ; Max 8 doses/24   hour period. Mix with 15 mLs Maalox or Mylanta.     losartan (COZAAR) 100 MG tablet Take 1 tablet (100 mg) by mouth   daily     medical cannabis (Patient's own supply) (The purpose of this   order is to document that the patient reports taking medical   cannabis.  This is not a prescription, and is not used to certify   that the patient has a qualifying medical condition.)     medroxyPROGESTERone (DEPO-PROVERA) 150 MG/ML IM injection   Inject 1 mL (150 mg) into the muscle every 3 months     methocarbamol (ROBAXIN) 750 MG tablet Take 1 tablet (750 mg) by   mouth 3 times daily as needed for muscle spasms     metoprolol succinate ER (TOPROL-XL) 200 MG 24 hr tablet Take 1   tablet (200 mg) by mouth daily     metroNIDAZOLE (FLAGYL) 500 MG tablet Take 1 tablet (500 mg) by   mouth 2 times daily for 7 days     NONFORMULARY Apply 30 mLs topically 4 times daily     ondansetron (ZOFRAN-ODT) 8 MG ODT tab Take 1 tablet (8 mg) by   mouth every 8 hours as needed for nausea     pantoprazole (PROTONIX) 40 MG EC tablet Take 1 tablet (40 mg)   by mouth 2 times daily Take 30-60 minutes before a meal.     polyethylene glycol (MIRALAX) 17 g packet Take 17 g by mouth 2   times daily     prazosin (MINIPRESS) 1 MG capsule Take 1 mg by mouth At Bedtime       prochlorperazine (COMPAZINE) 10 MG tablet Take 1 tablet (10 mg)   by mouth every 6 hours as needed for nausea or vomiting     rizatriptan (MAXALT-MLT) 5 MG ODT Take 1-2 tablets (5-10 mg) by   mouth at onset of headache for migraine May repeat in 2 hours.   Max 6 tablets/24 hours.     sucralfate (CARAFATE) 1 GM tablet Take 1 tablet (1 g) by mouth   4 times daily as needed (heartburn)     valACYclovir (VALTREX) 1000 mg tablet 2 tabs at onset of mouth   sores.  Repeat dose in 12 hours.                Review of Systems:   Negative other than HPI          Physical Exam:   All vitals have been reviewed  Temp:  [96.7  F (35.9  C)] 96.7  F (35.9  C)  Heart Rate:  [136] 136  Resp:  [18] 18  BP: (129)/(91) 129/91  SpO2:  [100 %] 100 %  No intake or output data in the 24 hours ending 04/27/20 1956    Physical Examination:   Vital Signs: BP (!) 129/91   Temp 96.7  F (35.9  C) (Oral)     Resp 18   Wt 86.2 kg (190 lb)   SpO2 100%   BMI 34.75 kg/m    GEN: NAD, using the ipad for sign language   EENT: normal, wearing glasses and mask   Chest: NLB on RA, tachycardic (98 - 112 during interview)   Abdomen: soft, nontender, nondistended  Rectal exam: No surrounding erythema or fluctuance. No skin   changes. ABDI without abnormalities. No blood on glove. The   vaginal and labia without erythema or skin changes. Pelvic exam   deferred.   Extremities: WWP, no edema          Data:   All laboratory data reviewed    Labs/Imaging/Other:  Labs and CT image ordered but not obtained                        Medications   0.9% sodium chloride BOLUS (0 mLs Intravenous Stopped 4/27/20 2117)   HYDROmorphone (PF) (DILAUDID) injection 1 mg (1 mg Intravenous Given 4/27/20 2051)   ondansetron (ZOFRAN) injection 4 mg (4 mg Intravenous Given 4/27/20 2051)   diphenhydrAMINE (BENADRYL) injection 25 mg (25 mg Intravenous Given 4/27/20 2119)        Assessments & Plan (with Medical Decision Making)     30-year-old female, multiple medical issues, here with fistula and vaginal pain.  On my exam, I do not see a obvious etiology for her pain.  No abscess noted inside the vagina, fistula appears closed and without any surrounding erythema or increased warmth, however patient is tender in that area.  Discussed case with surgery, rationale being she has a procedure with them scheduled in a few days and her pain is likely related to her fistula and she arrived tachycardic so likely to be something more than just her chronic fistula pain.  Patient has a  documented contrast allergy, discussed case with radiologist who says there is an epic protocol, I have ordered that.    I have reviewed the nursing notes. I have reviewed the findings, diagnosis, plan and need for follow up with the patient.    Discharge Medication List as of 4/27/2020  9:18 PM      START taking these medications    Details   oxyCODONE-acetaminophen (PERCOCET) 5-325 MG tablet Take 1-2 tablets by mouth every 4 hours as needed for pain, Disp-12 tablet,R-0, Local Print             Final diagnoses:   Anal fistula       --  Trent Gallegos MD   Emergency Medicine   [facility], [place], EMERGENCY DEPARTMENT  4/27/2020     Trent Gallegos MD  04/28/20 1952

## 2020-04-28 NOTE — PROGRESS NOTES
Patient called in with   to confirm her appointment with Dr Elise tomorrow 4/29 at 2:20   patient will be using her IPad for the video conferencing.  Patient informed that she will be sent a my chart message with the jonathan that needs to be downloaded prior to the visit.    Patient aware that an  will also be added to the visit via video. All patient questions were answered.

## 2020-04-28 NOTE — TELEPHONE ENCOUNTER
Patient sent [employer] message to surgeon. She also went to the ER yesterday.   Not sure if RN needs to do a follow up ER call. If not, please close encounter. I am unable to close it.

## 2020-04-28 NOTE — ED TRIAGE NOTES
Pt presents to triage with c/o vaginal pain and swelling. Pt reports she has surgery scheduled for this Thursday for an anal fistula. The vaginal pain and swelling is new as of today. Tachy to the 120s-140s in triage, AOVSS in RA.

## 2020-04-28 NOTE — TELEPHONE ENCOUNTER
Routing refill request to provider for review/approval because:  Drug not on the [facility] refill protocol     Last Written Prescription Date:  4/14/20  Last Fill Quantity: 28,  # refills: 0   Last office visit: 4/23/2020 with prescribing provider:     Future Office Visit:      Ines Roy, RN, BSN

## 2020-04-28 NOTE — PROGRESS NOTES
Clinic Care Coordination Contact    Patient prefers mychart communication. CC RN sent a [employer] message.    RADHA Otto RN Clinic Care Coordinator   [place], [place], [place]  Phone: [phone]

## 2020-04-29 ENCOUNTER — PRE VISIT (OUTPATIENT)
Dept: GASTROENTEROLOGY | Facility: CLINIC | Age: 30
End: 2020-04-29

## 2020-04-29 ENCOUNTER — VIRTUAL VISIT (OUTPATIENT)
Dept: GASTROENTEROLOGY | Facility: CLINIC | Age: 30
End: 2020-04-29
Payer: MEDICARE

## 2020-04-29 ENCOUNTER — PATIENT OUTREACH (OUTPATIENT)
Dept: CARE COORDINATION | Facility: CLINIC | Age: 30
End: 2020-04-29

## 2020-04-29 ENCOUNTER — TELEPHONE (OUTPATIENT)
Dept: SURGERY | Facility: CLINIC | Age: 30
End: 2020-04-29

## 2020-04-29 DIAGNOSIS — Z53.9 ERRONEOUS ENCOUNTER--DISREGARD: Primary | ICD-10-CM

## 2020-04-29 NOTE — TELEPHONE ENCOUNTER
[facility] Call Center    Phone Message    May a detailed message be left on voicemail: yes     Reason for Call: Other: Pt's mother is concerned for getting pt transportation to her surgery for 4/30. She states she has not been able to schedule a ride, and they are wanting to get advice from the clinic on how to proceed. Please advise.    Action Taken: Message routed to:  [facility] ([facility] Surgery    Travel Screening: Not Applicable

## 2020-04-29 NOTE — TELEPHONE ENCOUNTER
Called patient's mother to advise she does need a responsible adult with her for 24 hours after her procedure as she will get anesthesia. Patient's mother is on her way up from [place] now so she will be with patient after her procedure.

## 2020-04-29 NOTE — TELEPHONE ENCOUNTER
RECORDS RECEIVED FROM: Internal    DATE RECEIVED: 4/29/20 2:20PM   NOTES STATUS DETAILS   OFFICE NOTE from referring provider Internal Internal referral   Colon and Rectal recs in [facility]  GI [facility] recs in [facility]     OFFICE NOTE from other specialist Internal    DISCHARGE SUMMARY from hospital Internal ED note 4/27/20   [facility] ED note 9/28/19   OPERATIVE REPORT Internal ANUS, possible fistulotomy - 4/30/20   MEDICATION LIST Internal         ENDOSCOPY  Internal 4/12/19, 8/14/18   COLONOSCOPY Internal 7/17/19, 8/14/18   STOOL TESTING N/A    PERTINENT LABS Internal    PATHOLOGY REPORTS (RELATED) N/A    IMAGING (CT, MRI, EGD) Internal MR PELVIS 4/17/20  XR ABD 4/4/20  CT ABD PELVIS 3/20/20      REFERRAL INFORMATION    Date referral was placed: 4/29/20   Date all records received:    Date records were scanned into Epic:    Date records were sent to Provider to review:    Date and recommendation received from provider:  LETTER SENT  SCHEDULE APPOINTMENT   Date patient was contacted to schedule: 4/28/20

## 2020-04-29 NOTE — TELEPHONE ENCOUNTER
Reached out to Vanesa Gonzalez, [facility], to see if she can help get transportation for patient. Called and notified Pt's mother that we are contacting Vanesa, our , and hopefully she can help.    Gaudencio De La Cruz LPN

## 2020-04-30 ENCOUNTER — OFFICE VISIT (OUTPATIENT)
Dept: INTERPRETER SERVICES | Facility: CLINIC | Age: 30
End: 2020-04-30
Payer: MEDICARE

## 2020-04-30 ENCOUNTER — HOSPITAL ENCOUNTER (OUTPATIENT)
Facility: CLINIC | Age: 30
Discharge: HOME | End: 2020-05-02
Attending: COLON & RECTAL SURGERY | Admitting: COLON & RECTAL SURGERY
Payer: MEDICARE

## 2020-04-30 ENCOUNTER — ANESTHESIA EVENT (OUTPATIENT)
Dept: SURGERY | Facility: CLINIC | Age: 30
End: 2020-04-30
Payer: MEDICARE

## 2020-04-30 ENCOUNTER — ANESTHESIA (OUTPATIENT)
Dept: SURGERY | Facility: CLINIC | Age: 30
End: 2020-04-30
Payer: MEDICARE

## 2020-04-30 VITALS
SYSTOLIC BLOOD PRESSURE: 125 MMHG | OXYGEN SATURATION: 100 % | HEART RATE: 78 BPM | DIASTOLIC BLOOD PRESSURE: 87 MMHG | TEMPERATURE: 97.9 F | HEIGHT: 62 IN | WEIGHT: 191.58 LBS | RESPIRATION RATE: 16 BRPM | BODY MASS INDEX: 35.25 KG/M2

## 2020-04-30 DIAGNOSIS — K60.30 ANAL FISTULA: ICD-10-CM

## 2020-04-30 DIAGNOSIS — K61.2 ABSCESS OF ANAL AND RECTAL REGIONS: ICD-10-CM

## 2020-04-30 LAB
GLUCOSE BLDC GLUCOMTR-MCNC: 74 MG/DL (ref 70–99)
HCG UR QL: NEGATIVE

## 2020-04-30 PROCEDURE — 25000125 ZZHC RX 250: Performed by: COLON & RECTAL SURGERY

## 2020-04-30 PROCEDURE — 71000015 ZZH RECOVERY PHASE 1 LEVEL 2 EA ADDTL HR: Performed by: COLON & RECTAL SURGERY

## 2020-04-30 PROCEDURE — 37000009 ZZH ANESTHESIA TECHNICAL FEE, EACH ADDTL 15 MIN: Performed by: COLON & RECTAL SURGERY

## 2020-04-30 PROCEDURE — 71000014 ZZH RECOVERY PHASE 1 LEVEL 2 FIRST HR: Performed by: COLON & RECTAL SURGERY

## 2020-04-30 PROCEDURE — 37000008 ZZH ANESTHESIA TECHNICAL FEE, 1ST 30 MIN: Performed by: COLON & RECTAL SURGERY

## 2020-04-30 PROCEDURE — 25000125 ZZHC RX 250: Performed by: NURSE ANESTHETIST, CERTIFIED REGISTERED

## 2020-04-30 PROCEDURE — T1013 SIGN LANG/ORAL INTERPRETER: HCPCS | Mod: U3

## 2020-04-30 PROCEDURE — 25000566 ZZH SEVOFLURANE, EA 15 MIN: Performed by: COLON & RECTAL SURGERY

## 2020-04-30 PROCEDURE — 25000128 H RX IP 250 OP 636: Performed by: ANESTHESIOLOGY

## 2020-04-30 PROCEDURE — 25000128 H RX IP 250 OP 636: Performed by: NURSE ANESTHETIST, CERTIFIED REGISTERED

## 2020-04-30 PROCEDURE — 36000051 ZZH SURGERY LEVEL 2 1ST 30 MIN - UMMC: Performed by: COLON & RECTAL SURGERY

## 2020-04-30 PROCEDURE — 40000170 ZZH STATISTIC PRE-PROCEDURE ASSESSMENT II: Performed by: COLON & RECTAL SURGERY

## 2020-04-30 PROCEDURE — 82962 GLUCOSE BLOOD TEST: CPT | Mod: GZ

## 2020-04-30 PROCEDURE — 25800030 ZZH RX IP 258 OP 636: Performed by: NURSE ANESTHETIST, CERTIFIED REGISTERED

## 2020-04-30 PROCEDURE — 27210794 ZZH OR GENERAL SUPPLY STERILE: Performed by: COLON & RECTAL SURGERY

## 2020-04-30 PROCEDURE — 71000027 ZZH RECOVERY PHASE 2 EACH 15 MINS: Performed by: COLON & RECTAL SURGERY

## 2020-04-30 PROCEDURE — 36000053 ZZH SURGERY LEVEL 2 EA 15 ADDTL MIN - UMMC: Performed by: COLON & RECTAL SURGERY

## 2020-04-30 PROCEDURE — 81025 URINE PREGNANCY TEST: CPT | Performed by: ANESTHESIOLOGY

## 2020-04-30 RX ORDER — HYDROMORPHONE HYDROCHLORIDE 1 MG/ML
.3-.5 INJECTION, SOLUTION INTRAMUSCULAR; INTRAVENOUS; SUBCUTANEOUS EVERY 5 MIN PRN
Status: DISCONTINUED | OUTPATIENT
Start: 2020-04-30 | End: 2020-04-30 | Stop reason: HOSPADM

## 2020-04-30 RX ORDER — FENTANYL CITRATE 50 UG/ML
INJECTION, SOLUTION INTRAMUSCULAR; INTRAVENOUS PRN
Status: DISCONTINUED | OUTPATIENT
Start: 2020-04-30 | End: 2020-04-30

## 2020-04-30 RX ORDER — NALOXONE HYDROCHLORIDE 0.4 MG/ML
.1-.4 INJECTION, SOLUTION INTRAMUSCULAR; INTRAVENOUS; SUBCUTANEOUS
Status: ACTIVE | OUTPATIENT
Start: 2020-04-30 | End: 2020-05-01

## 2020-04-30 RX ORDER — ONDANSETRON 2 MG/ML
INJECTION INTRAMUSCULAR; INTRAVENOUS PRN
Status: DISCONTINUED | OUTPATIENT
Start: 2020-04-30 | End: 2020-04-30

## 2020-04-30 RX ORDER — SODIUM CHLORIDE, SODIUM LACTATE, POTASSIUM CHLORIDE, CALCIUM CHLORIDE 600; 310; 30; 20 MG/100ML; MG/100ML; MG/100ML; MG/100ML
INJECTION, SOLUTION INTRAVENOUS CONTINUOUS
Status: DISCONTINUED | OUTPATIENT
Start: 2020-04-30 | End: 2020-04-30 | Stop reason: HOSPADM

## 2020-04-30 RX ORDER — ONDANSETRON 2 MG/ML
4 INJECTION INTRAMUSCULAR; INTRAVENOUS EVERY 30 MIN PRN
Status: DISCONTINUED | OUTPATIENT
Start: 2020-04-30 | End: 2020-04-30 | Stop reason: HOSPADM

## 2020-04-30 RX ORDER — LIDOCAINE 40 MG/G
CREAM TOPICAL
Status: DISCONTINUED | OUTPATIENT
Start: 2020-04-30 | End: 2020-04-30 | Stop reason: HOSPADM

## 2020-04-30 RX ORDER — PROPOFOL 10 MG/ML
INJECTION, EMULSION INTRAVENOUS PRN
Status: DISCONTINUED | OUTPATIENT
Start: 2020-04-30 | End: 2020-04-30

## 2020-04-30 RX ORDER — HYDROMORPHONE HYDROCHLORIDE 2 MG/1
2 TABLET ORAL EVERY 6 HOURS PRN
Qty: 10 TABLET | Refills: 0 | Status: SHIPPED | OUTPATIENT
Start: 2020-04-30 | End: 2020-06-10

## 2020-04-30 RX ORDER — FENTANYL CITRATE 50 UG/ML
25-50 INJECTION, SOLUTION INTRAMUSCULAR; INTRAVENOUS
Status: DISCONTINUED | OUTPATIENT
Start: 2020-04-30 | End: 2020-04-30 | Stop reason: HOSPADM

## 2020-04-30 RX ORDER — OXYCODONE HYDROCHLORIDE 5 MG/1
5 TABLET ORAL EVERY 4 HOURS PRN
Status: DISCONTINUED | OUTPATIENT
Start: 2020-04-30 | End: 2020-05-02 | Stop reason: HOSPADM

## 2020-04-30 RX ORDER — ONDANSETRON 4 MG/1
4 TABLET, ORALLY DISINTEGRATING ORAL EVERY 30 MIN PRN
Status: DISCONTINUED | OUTPATIENT
Start: 2020-04-30 | End: 2020-04-30 | Stop reason: HOSPADM

## 2020-04-30 RX ORDER — BUPIVACAINE HYDROCHLORIDE AND EPINEPHRINE 2.5; 5 MG/ML; UG/ML
INJECTION, SOLUTION EPIDURAL; INFILTRATION; INTRACAUDAL; PERINEURAL PRN
Status: DISCONTINUED | OUTPATIENT
Start: 2020-04-30 | End: 2020-04-30 | Stop reason: HOSPADM

## 2020-04-30 RX ORDER — ONDANSETRON 4 MG/1
4 TABLET, ORALLY DISINTEGRATING ORAL
Status: DISCONTINUED | OUTPATIENT
Start: 2020-04-30 | End: 2020-05-02 | Stop reason: HOSPADM

## 2020-04-30 RX ORDER — SODIUM CHLORIDE, SODIUM LACTATE, POTASSIUM CHLORIDE, CALCIUM CHLORIDE 600; 310; 30; 20 MG/100ML; MG/100ML; MG/100ML; MG/100ML
INJECTION, SOLUTION INTRAVENOUS CONTINUOUS PRN
Status: DISCONTINUED | OUTPATIENT
Start: 2020-04-30 | End: 2020-04-30

## 2020-04-30 RX ORDER — LIDOCAINE HYDROCHLORIDE 20 MG/ML
INJECTION, SOLUTION INFILTRATION; PERINEURAL PRN
Status: DISCONTINUED | OUTPATIENT
Start: 2020-04-30 | End: 2020-04-30

## 2020-04-30 RX ADMIN — SUGAMMADEX 200 MG: 100 INJECTION, SOLUTION INTRAVENOUS at 10:10

## 2020-04-30 RX ADMIN — FENTANYL CITRATE 50 MCG: 50 INJECTION, SOLUTION INTRAMUSCULAR; INTRAVENOUS at 09:25

## 2020-04-30 RX ADMIN — FENTANYL CITRATE 100 MCG: 50 INJECTION, SOLUTION INTRAMUSCULAR; INTRAVENOUS at 09:30

## 2020-04-30 RX ADMIN — PROPOFOL 50 MG: 10 INJECTION, EMULSION INTRAVENOUS at 09:45

## 2020-04-30 RX ADMIN — FENTANYL CITRATE 25 MCG: 50 INJECTION, SOLUTION INTRAMUSCULAR; INTRAVENOUS at 10:50

## 2020-04-30 RX ADMIN — ROCURONIUM BROMIDE 30 MG: 10 INJECTION INTRAVENOUS at 09:32

## 2020-04-30 RX ADMIN — ROCURONIUM BROMIDE 20 MG: 10 INJECTION INTRAVENOUS at 09:55

## 2020-04-30 RX ADMIN — SODIUM CHLORIDE, POTASSIUM CHLORIDE, SODIUM LACTATE AND CALCIUM CHLORIDE: 600; 310; 30; 20 INJECTION, SOLUTION INTRAVENOUS at 09:15

## 2020-04-30 RX ADMIN — FENTANYL CITRATE 50 MCG: 50 INJECTION, SOLUTION INTRAMUSCULAR; INTRAVENOUS at 10:29

## 2020-04-30 RX ADMIN — FENTANYL CITRATE 100 MCG: 50 INJECTION, SOLUTION INTRAMUSCULAR; INTRAVENOUS at 09:54

## 2020-04-30 RX ADMIN — FENTANYL CITRATE 25 MCG: 50 INJECTION, SOLUTION INTRAMUSCULAR; INTRAVENOUS at 10:39

## 2020-04-30 RX ADMIN — MIDAZOLAM 2 MG: 1 INJECTION INTRAMUSCULAR; INTRAVENOUS at 09:15

## 2020-04-30 RX ADMIN — PROPOFOL 150 MG: 10 INJECTION, EMULSION INTRAVENOUS at 09:32

## 2020-04-30 RX ADMIN — HYDROMORPHONE HYDROCHLORIDE 0.3 MG: 1 INJECTION, SOLUTION INTRAMUSCULAR; INTRAVENOUS; SUBCUTANEOUS at 11:54

## 2020-04-30 RX ADMIN — LIDOCAINE HYDROCHLORIDE 100 MG: 20 INJECTION, SOLUTION INFILTRATION; PERINEURAL at 09:30

## 2020-04-30 RX ADMIN — ONDANSETRON 4 MG: 2 INJECTION INTRAMUSCULAR; INTRAVENOUS at 09:38

## 2020-04-30 RX ADMIN — Medication 100 MG: at 09:32

## 2020-04-30 ASSESSMENT — ENCOUNTER SYMPTOMS: SEIZURES: 1

## 2020-04-30 ASSESSMENT — MIFFLIN-ST. JEOR: SCORE: 1537.25

## 2020-04-30 NOTE — H&P
Colorectal H&P    H&P from consult on 4/19/2020 reviewed. No updates or changes.    Plan EUA with possible fistulotomy vs seton.    Valentin Sanchez MD  04/30/20  8:45 AM

## 2020-04-30 NOTE — ANESTHESIA PREPROCEDURE EVALUATION
Anesthesia Pre-Procedure Evaluation    Patient: Katy Islas   MRN:     [id number] Gender:   female   Age:    30 year old :      1990        Preoperative Diagnosis: Abscess of anal and rectal regions [K61.2]   Procedure(s):  EXAM UNDER ANESTHESIA, ANUS, possible fistulotomy  PLACEMENT, SETON STITCH     LABS:  CBC:   Lab Results   Component Value Date    WBC 6.1 2020    WBC 7.4 2020    HGB 12.8 2020    HGB 13.3 2020    HCT 37.9 2020    HCT 40.3 2020     2020     2020     BMP:   Lab Results   Component Value Date     2020     2020    POTASSIUM 4.0 2020    POTASSIUM 3.9 2020    CHLORIDE 107 2020    CHLORIDE 111 (H) 2020    CO2 26 2020    CO2 22 2020    BUN 17 2020    BUN 16 2020    CR 0.88 2020    CR 0.87 2020    GLC 86 2020    GLC 86 2020     COAGS:   Lab Results   Component Value Date    PTT 44 (H) 10/12/2017    INR 1.0 2019     POC:   Lab Results   Component Value Date    BGM 74 2020    HCG Negative 2020    HCGS Negative 2020     OTHER:   Lab Results   Component Value Date    PH 5.5 2002    LACT 1.5 2020    A1C 5.0 2018    SARANYA 9.0 2020    PHOS 4.5 2018    MAG 1.7 2005    ALBUMIN 3.8 2020    PROTTOTAL 7.2 2020    ALT 39 2020    AST 50 (H) 2020    ALKPHOS 99 2020    BILITOTAL 0.6 2020    LIPASE 87 2020    AMYLASE 88 2004    TSH 0.86 2019    T4 1.71 2012    CRP <2.9 2020    SED 9 2020        Preop Vitals    BP Readings from Last 3 Encounters:   20 122/86   20 117/79   20 118/75    Pulse Readings from Last 3 Encounters:   20 101   20 95   20 98      Resp Readings from Last 3 Encounters:   20 14   20 13   20 16    SpO2 Readings from Last 3 Encounters:   20 100%  "  04/27/20 92%   04/24/20 98%      Temp Readings from Last 1 Encounters:   04/30/20 37.2  C (99  F) (Oral)    Ht Readings from Last 1 Encounters:   04/30/20 1.575 m (5' 2\")      Wt Readings from Last 1 Encounters:   04/30/20 86.9 kg (191 lb 9.3 oz)    Estimated body mass index is 35.04 kg/m  as calculated from the following:    Height as of this encounter: 1.575 m (5' 2\").    Weight as of this encounter: 86.9 kg (191 lb 9.3 oz).     LDA:        Past Medical History:   Diagnosis Date     Abdominal pain 10/31- 11/4/2005    [facility] admit for Crohn's     Allergic rhinitis, cause unspecified     Allergic rhinitis     Arthritis      C. difficile diarrhea     Past, no current diarrhea.     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, without mention of intractable epilepsy     pseudoseizures diagnosed after extensive neurologic eval     Migraine 07/21/12    D/C 07/22/12-[facility]     Migraine, unspecified, without mention of intractable migraine without mention of status migrainosus     Migraine     Mild intermittent asthma     mild intermittent     Mycoplasma infection in conditions classified elsewhere and of unspecified site      Other chronic pain     Back pain for 6 years     Renal disease     Kidney stones     Unspecified hearing loss     congenital hearing loss      Past Surgical History:   Procedure Laterality Date     AS REMOVAL OF COCCYX  10/18/2017     C FUSION OF SACROILIAC JOINT  10/26/2018     COLONOSCOPY  7/1/2009    [facility], [facility] [place].     COLONOSCOPY N/A 7/17/2019    Procedure: Colonoscopy, With Polypectomy And Biopsy;  Surgeon: Edwin Conde MD;  Location: [facility]     COLONOSCOPY WITH CO2 INSUFFLATION N/A 7/17/2019    Procedure: COLONOSCOPY, WITH CO2 INSUFFLATION;  Surgeon: Edwin Conde MD;  Location: [facility]     COMBINED ESOPHAGOSCOPY, GASTROSCOPY, DUODENOSCOPY (EGD) WITH CO2 INSUFFLATION N/A 4/12/2019    Procedure: COMBINED ESOPHAGOSCOPY, GASTROSCOPY, DUODENOSCOPY (EGD) WITH CO2 INSUFFLATION;  Surgeon: Edwin Conde MD;  Location: [facility] OR     ESOPHAGOSCOPY, GASTROSCOPY, DUODENOSCOPY (EGD), COMBINED N/A 4/12/2019    Procedure: Combined Esophagoscopy, Gastroscopy, Duodenoscopy (Egd), Biopsy Single Or Multiple;  Surgeon: Edwin Conde MD;  Location: [facility] OR     FUSION SPINE POSTERIOR MINIMALLY INVASIVE ONE LEVEL N/A 2/23/2017    Procedure: FUSION SPINE POSTERIOR MINIMALLY INVASIVE ONE LEVEL;  L4-5 Oblique Lateral Lumbar Interbody Fusion   Epidural steroid injection.   Transpedicular Bone marrow aspiration;  Surgeon: Jeniffer Eugene MD;  Location: [facility] OR     HC COLONOSCOPY THRU STOMA WITH BIOPSY  10/29/2003    Impression is that of normal appearing colonoscopy, without evidence of rectal bleeding.     HC COLONOSCOPY THRU STOMA, DIAGNOSTIC  10/00    normal     HC COLONOSCOPY THRU STOMA, DIAGNOSTIC  Oct 2009    Dr López- normal     HC EEG AWAKE AND SLEEP      abnormal     HC MRI BRAIN W/O CONTRAST  12/00    normal     HC REMOVAL GALLBLADDER  8/5/2009    [facility].     HC UGI ENDOSCOPY DIAG W BIOPSY  11/11/09    Normal esophagus     HC UGI ENDOSCOPY, SIMPLE EXAM  7/00, 10/00    mild chronic esophagitis and duodenitis, neg H pylori     HC UGI ENDOSCOPY, SIMPLE EXAM  01/20/2005    Esophagogastroduodenoscopy, colonoscopy with biopsies.  [facility]     HC UGI ENDOSCOPY, SIMPLE EXAM  7/1/2009    [facility].     HC UGI ENDOSCOPY, SIMPLE EXAM   11/11/2009    attempted upper GI, pt. could not tolerate procedure:[facility]     ORTHOPEDIC SURGERY  October 19,2011    diskectomy L4-L5      Allergies   Allergen Reactions     Iohexol Hives     Other reaction(s): Hives  IV contrast; patient states she tolerates contrast if she gets benadryl before  IV contrast; patient states she tolerates contrast if she gets benadryl before     Ativan [Lorazepam] Hives     At the IV site     Baclofen      hives     Bees Hives, Swelling and Difficulty breathing     Caffeine      Contrast Dye      Hives,   Updated 5/10/2016 CT Contrast.     Dilaudid [Hydromorphone] Itching     Iodine Hives     Methocarbamol Swelling     Metoclopramide      Other reaction(s): Tremors  LW Reaction: shaking/sweating     Midazolam      Other reaction(s): Agitation     Monosodium Glutamate      Morphine Other (See Comments)     Difficulty with urination     Nsaids Other (See Comments)     GI BLEED x2     Other (Do Not Use) Other (See Comments)     Xanaflex- pt becomes disoriented and loses bladder control     Reglan [Metoclopramide Hcl] Other (See Comments)     shaking     Soma [Carisoprodol] Visual Disturbance     Sleep walking     Tizanidine      Topamax Other (See Comments)     Topamax [Topiramate] Nausea     Tingling  GI/Vomit     Tramadol      Severe Headache, Seizure Risk     Tylenol W/Codeine [Acetaminophen-Codeine] Nausea and Itching     Tylenol 3     Versed Other (See Comments)     Zolmitriptan      Makes face feel like its twitching     Droperidol Anxiety     Flu Virus Vaccine Rash      Arm swelling        Anesthesia Evaluation     .             ROS/MED HX    ENT/Pulmonary: Comment: Deaf - neg pulmonary ROS   (+)Intermittent asthma , . .    Neurologic:  - neg neurologic ROS   (+)seizures last seizure: 2015 features: with toradol,     Cardiovascular:  - neg cardiovascular ROS   (+) hypertension----. : . . . :. .       METS/Exercise Tolerance:  4 - Raking leaves, gardening    Hematologic:  - neg hematologic  ROS       Musculoskeletal:  - neg musculoskeletal ROS       GI/Hepatic:  - neg GI/hepatic ROS   (+) GERD Inflammatory bowel disease,       Renal/Genitourinary:  - ROS Renal section negative       Endo:  - neg endo ROS   (+) Obesity, .      Psychiatric:  - neg psychiatric ROS   (+) psychiatric history anxiety, depression and bipolar      Infectious Disease:  - neg infectious disease ROS       Malignancy:      - no malignancy   Other:    (+) H/O Chronic Pain,  - neg other ROS                     PHYSICAL EXAM:   Mental Status/Neuro: A/A/O; Age Appropriate   Airway: Facies: Feasible  Mallampati: I  Mouth/Opening: Full  TM distance: > 6 cm  Neck ROM: Full   Respiratory: Auscultation: CTAB     Resp. Rate: Normal     Resp. Effort: Normal      CV: Rhythm: Regular  Rate: Age appropriate  Heart: Normal Sounds  Edema: None   Comments:      Dental: Normal Dentition                Assessment:   ASA SCORE: 3    H&P: History and physical reviewed and following examination; no interval change.   Smoking Status:  Non-Smoker/Unknown   NPO Status: NPO Appropriate     Plan:   Anes. Type:  General   Pre-Medication: None   Induction:  IV (RSI)   Airway: ETT; Oral   Access/Monitoring: PIV   Maintenance: Balanced     Postop Plan:   Postop Pain: Opioids  Postop Sedation/Airway: Not planned  Disposition: Outpatient     PONV Management:   Adult Risk Factors: Female, Non-Smoker, Postop Opioids   Prevention: Ondansetron     CONSENT: Direct conversation   Plan and risks discussed with: Patient   Blood Products: Consent Deferred (Minimal Blood Loss)       Comments for Plan/Consent:  H&P through                  Sarah Wachter, MD

## 2020-04-30 NOTE — PROGRESS NOTES
Social Work Intervention  [facility] [place]    Data/Intervention:    Patient Name:  Katy Islas  /Age:  1990 (30 year old)    Visit Type: telephone  Referral Source: Gaudencio De La Cruz LPN  Reason for Referral:  Transportation to procedure in the morning    Collaborated With:    -Pt's mother Aysha  -Gaudencio De La Cruz LPN    Patient Concerns/Issues:   Pt's friend who was going to drive her to the procedure backed out and she now needs a ride. She lives in [place]. Mom reports that she tried Uber/Lyft but it wouldn't allow her to make the reservation ahead of time.    Intervention/Education/Resources Provided:  Reviewed plans and discussed a few cab companies she could try to schedule ahead. Brought up the issue of Pt likely needing someone to ride with her in a cab on the way home after the procedure which is typical protocol. Requested Gaudencio to contact Mom about this. He spoke with her and Mom apparently decided to drive from [place] to take Pt to her procedure in the morning.    Assessment/Plan:  No further f/u planned.    Provided patient/family with contact information and availability.    REMY George, [facility]    [facility] [place]  [phone]

## 2020-04-30 NOTE — OR NURSING
Reviewed post op instructions with pt and mother, Aysha via phone.  Paper discharge instructions sent home with pt.

## 2020-04-30 NOTE — ANESTHESIA CARE TRANSFER NOTE
Patient: Katy Islas    Procedure(s):  EXAM UNDER ANESTHESIA, ANUS, parital fistulotomy    Diagnosis: Abscess of anal and rectal regions [K61.2]  Diagnosis Additional Information: No value filed.    Anesthesia Type:   General     Note:  Airway :Nasal Cannula  Patient transferred to:PACU  Comments: To PACU on supplemental O2 with + air exchange, placed on monitor. Report given to RN, questions answered, VSS, patient alert.Handoff Report: Identifed the Patient, Identified the Reponsible Provider, Reviewed the pertinent medical history, Discussed the surgical course, Reviewed Intra-OP anesthesia mangement and issues during anesthesia, Set expectations for post-procedure period and Allowed opportunity for questions and acknowledgement of understanding      Vitals: (Last set prior to Anesthesia Care Transfer)    CRNA VITALS  4/30/2020 1001 - 4/30/2020 1045      4/30/2020             Pulse:  107    Ht Rate:  105    SpO2:  96 %            Electronically Signed By: NATA Moctezuma CRNA  April 30, 2020  10:45 AM

## 2020-04-30 NOTE — ANESTHESIA POSTPROCEDURE EVALUATION
Anesthesia POST Procedure Evaluation    Patient: Katy Islas   MRN:     [id number] Gender:   female   Age:    30 year old :      1990        Preoperative Diagnosis: Abscess of anal and rectal regions [K61.2]   Procedure(s):  EXAM UNDER ANESTHESIA, ANUS, parital fistulotomy   Postop Comments: No value filed.     Anesthesia Type: General       Disposition: Outpatient   Postop Pain Control: Uneventful            Sign Out: Well controlled pain   PONV: No   Neuro/Psych: Uneventful            Sign Out: Acceptable/Baseline neuro status   Airway/Respiratory: Uneventful            Sign Out: Acceptable/Baseline resp. status   CV/Hemodynamics: Uneventful            Sign Out: Acceptable CV status   Other NRE: NONE   DID A NON-ROUTINE EVENT OCCUR? No         Last Anesthesia Record Vitals:  CRNA VITALS  2020 1001 - 2020 1101      2020             Pulse:  107    Ht Rate:  105    SpO2:  96 %          Last PACU Vitals:  Vitals Value Taken Time   /73 2020 11:45 AM   Temp 36.7  C (98  F) 2020 11:45 AM   Pulse 78 2020 11:45 AM   Resp 14 2020 11:45 AM   SpO2 95 % 2020 11:45 AM   Temp src     NIBP     Pulse     SpO2     Resp     Temp     Ht Rate     Temp 2           Electronically Signed By: Sarah Wachter, MD, 2020, 1:02 PM   positive S1/positive S2

## 2020-04-30 NOTE — OP NOTE
"DATE:  04/30/20    PREOPERATIVE DIAGNOSIS:  1. Chronic perianal pain  2. Perianal fistula    POSTOPERATIVE DIAGNOSIS:   1. Chronic perianal pain  2. Perianal fistula    PROCEDURE:  1. Exam under anesthesia.  2. Partial fistulotomy    ANESTHESIA: GET plus local anesthesia.    SURGEON:  Valentin Sanchez MD    ASSISTANT(S): None    COMPLICATIONS: none, immediately.    ESTIMATED BLOOD LOSS: <5 mL.    SPECIMEN(S): None.    DRAINS/TUBES: None.    OPERATIVE FINDINGS: Small posterior midline fistula with small abscess cavity.    DISPOSITION: PACU.    INDICATIONS FOR PROCEDURE  Katy Islas is a 30 year old female who presented with chronic perianal pain and pressure. MRI showed small posterior fistula. EUA with possible fistulotomy vs seton was recommended. I thoroughly discussed the risks, benefits, and alternatives of operative treatment with the patient and he/she agreed to proceed.    General risks related to anorectal surgery were reviewed with the patient. These include, but are not limited to urinary retention, abscess, infection, bleeding, chronic pain, anal stenosis, fistula, fissure, and fecal incontinence.     OPERATIVE PROCEDURE: After obtaining informed consent, the patient was brought to the operating room. GET anesthesia was gently induced. The patient was then positioned in the prone position. Bilateral lower extremity pneumatic compression devices were applied and all pressure points were cushioned. The perianal area was then prepped and draped in the standard sterile fashion. After a \"time-out\" was performed, a total of 30 mL of Bupivacaine 0.25% with epinephrine was injected as a pudendal block. Digital rectal exam and anoscopy were performed. A small posterior midline external opening was noted. Slight nodularity of the posterior midline anal canal was noted. No fissure or other abnormality noted. A fistula probe was placed into the external opening and tracked straight towards the posterior " midline anal canal. No internal opening was able to be found. Hydrogen peroxide was injected into the fistula tract with no bubbling noted in the anal canal. The fistula tract was partially opened with cautery revealing a small intersphincteric abscess cavity. A fistula probe was again placed into this cavity but no internal opening was found. The cavity was cleared with a curette. I did not extend the fistulotomy further since this would require the cutting of more internal sphincter and the cavity was adequately unroofed. The wound edges were then marsupialized with a running 3-0 vicryl suture. The distal rectum and anal canal were irrigated. Hemostasis was achieved. Instrument, sponge, and needle counts were all correct as reported to me. Dry gauze and mesh underwear were placed. The patient tolerated the procedure well.    Valentin Sanchez M.D.    Division of Colon & Rectal Surgery  [facility]

## 2020-04-30 NOTE — DISCHARGE INSTRUCTIONS
[facility]  Same-Day Surgery   Adult Discharge Orders & Instructions     For 24 hours after surgery    1. Get plenty of rest.  A responsible adult must stay with you for at least 24 hours after you leave the hospital.   2. Do not drive or use heavy equipment.  If you have weakness or tingling, don't drive or use heavy equipment until this feeling goes away.  3. Do not drink alcohol.  4. Avoid strenuous or risky activities.  Ask for help when climbing stairs.   5. You may feel lightheaded.  IF so, sit for a few minutes before standing.  Have someone help you get up.   6. If you have nausea (feel sick to your stomach): Drink only clear liquids such as apple juice, ginger ale, broth or 7-Up.  Rest may also help.  Be sure to drink enough fluids.  Move to a regular diet as you feel able.  7. You may have a slight fever. Call the doctor if your fever is over 100 F (37.7 C) (taken under the tongue) or lasts longer than 24 hours.  8. You may have a dry mouth, a sore throat, muscle aches or trouble sleeping.  These should go away after 24 hours.  9. Do not make important or legal decisions.   Call your doctor for any of the followin.  Signs of infection (fever, growing tenderness at the surgery site, a large amount of drainage or bleeding, severe pain, foul-smelling drainage, redness, swelling).    2. It has been over 8 to 10 hours since surgery and you are still not able to urinate (pass water).    3.  Headache for over 24 hours.    4.  Numbness, tingling or weakness the day after surgery (if you had spinal anesthesia).  To contact a doctor, call ________________________________________ or:        [phone] and ask for the resident on call for   ______________________________________________ (answered 24 hours a day)      Emergency Department:    [facility]: [phone]       (TTY for hearing impaired: [phone])      Anorectal Surgery Instructions    What can I  expect after anorectal surgery?  Most anorectal procedures are done as outpatient surgery, and you go home the same day as the procedure. A few surgical procedures will require that you stay in the hospital for about one to three days. No matter where the procedure is done or how long or short it takes, these recommendations will help you heal and feel more comfortable.    Medicines:  The anal area is very sensitive; you can expect to have some pain for up to 2-4 weeks after the procedure. Your doctor will give you a prescription for one or more pain medications.    Take naprosyn 500 mg twice a day OR ibuprofen 600 mg four times a day     Take this on a regular basis (not as needed) following your surgery.     The drugs are best taken with food.  Do not take if it causes stomach upset or if you have a history of ulcers or gastritis. You can stop the naprosyn (or ibuprofen) or reduce the dose when you are feeling better.    DO NOT use naprosyn, ibuprofen, or other similar agents (eg. Advil or Aleve) if you have inflammatory bowel disease (Ulcerative Colitis or Crohn's disease) or if your doctor as advised you against using these medications    Take acetominaphen (Tylenol) 650-1000 mg four times a day.     Take this on a regular basis (not as needed) following surgery for pain control.     Take the lower dose if you are >65 years old or have liver disease. The maximum dose of acetominaphen is 4000 mg a day. You can stop the acetaminophen or reduce the dose when you are feeling better.    It is important to realize that many narcotic pain relievers (including vicodin, percocet, tylenol #3) also have acetaminophen, and excessive doses of acetaminophen can be dangerous, so do not take these in addition to acetominaphen.  You may take narcotics that don't contain acetominaphen such as oxycodone.      Take dilaudid AS NEEDED in addition to the acetominaphen and naprosyn.      Because narcotics have side effects (including  constipation), you should reduce your use of these medications as tolerated as your pain improves.    *In general, the best strategy is to take (if you are able to tolerate it) the tylenol and naprosen on a regular basis until your pain has largely gone away. You can take the narcotic pain medicine as needed in addition to the tylenol and ibuprofen. As your pain begins to lessen, you should cut back on your narcotic use while continuing to take your regular tylenol and naprosyn doses.      Refilling prescriptions. If you need additional pain medication, please call the triage nurse at [phone] during normal business hours (8 a.m. to 4 p.m., Monday though Friday) or have your pharmacy fax a refill request to [phone]. If you call after hours or on the weekends, the doctor on call may not know you personally and may not renew narcotic pain medication by phone. Call your primary care provider for all other medication refills.    Perineal care:  External gauze dressing can be removed the morning after surgery. If you have an adhesive dressing stuck to the incision, DO NOT remove this.   Tub baths:    If possible, take a tub bath immediately after each bowel movement.     Baths should be take at least 3 times daily for the first week to 10 days following your procedure. You should soak in the tub for 10 to 15 minutes each time with water as warm as you can tolerate.     Even after you go back to work, it is a good idea to sit in the tub in the morning, after returning from work, and again in the evening before bedtime.    Bleeding/Infection:    You can expect to have some bleeding after bowel movements, but it should stop soon after you wipe.     Use a wet cloth or perianal pad (Tucks or Preparation H pads) to gently wipe the area after each bowel movement.    Do not rub the anal area or use a lot of pressure.    Using a spray bottle filled with warm water helps loosen any remaining stool. Blot gently with a soft  dry cloth or tissue paper.    Infection around the anal opening is not very common. The anal area has excellent blood supply, which helps the area to heal. Bloody discharge after bowel movements is normal and may last 2 to 4 weeks after your surgery. However, if you bleed between bowel movements and cannot get it to stop, call the triage nurse immediately [phone].    Bowel function:  Take a fiber supplement such as Metamucil, which is over the counter. It is important to drink six to eight glasses of water or juice everyday when using fiber products.    If you do not have a bowel movement after 1-2 days:    Take Milk of Magnesia-2 tablespoons.       If there are no results, repeat this or add over the counter Miralax.      If you still do not have results, contact the clinic.     If there are no results, repeat this. Stop taking Milk of Magnesia or other laxatives if you begin to have diarrhea.    * Constipation will cause you to strain when you have a bowel movement. The hard stool will be difficult to pass, will increase pain and bleeding, and will slow down healing.  Try to avoid constipation and/or diarrhea as this can make the pain and bleeding worse.    * It is important to have regular bowel movements at least every other day and to keep your stool soft.  A high fiber diet, including at least four servings of fruits or vegetables daily, will help to keep your bowel movements regular and soft.    Activity:  After your procedure, there are no restrictions on your activity     except restrictions surrounding being on narcotics and in pain, such as no heavy machine operating or driving.     You may walk, climb stairs, ride in a car, and sit as tolerated.     It is helpful to avoid sitting in one position for long periods (2 or more hours).    After some surgeries, you may be told not to perform any lifting (more than 10 pounds) for several weeks after surgery.    When to call:  When do I need to call the  doctor or triage nurse?    If you experience any of the problems listed here, call our triage nurse during business hours ([phone]).     The nurse will help you with your problem or have the doctor call you.     After hours and on weekends, please call the main hospital number ([phone]) and ask for the colon and rectal surgery person on call.     Some is available to help you 24 hours a day, seven days a week.    Call for:   ? Fever greater than 101 degrees   ? Chills   ? Foul-smelling drainage   ? Nausea and vomiting   ? Diarrhea - greater than 3 water stools in 24 hours   ? Constipation - no bowel movement after 3 days   ? Severe bleeding that does not stop soon after a bowel movement   ? Problems with the incision, including increased pain, swelling, or redness

## 2020-05-01 ENCOUNTER — TELEPHONE (OUTPATIENT)
Dept: FAMILY MEDICINE | Facility: OTHER | Age: 30
End: 2020-05-01

## 2020-05-01 ENCOUNTER — PATIENT OUTREACH (OUTPATIENT)
Dept: SURGERY | Facility: CLINIC | Age: 30
End: 2020-05-01

## 2020-05-01 ENCOUNTER — MYC MEDICAL ADVICE (OUTPATIENT)
Dept: FAMILY MEDICINE | Facility: OTHER | Age: 30
End: 2020-05-01

## 2020-05-01 ENCOUNTER — NURSE TRIAGE (OUTPATIENT)
Dept: NURSING | Facility: CLINIC | Age: 30
End: 2020-05-01

## 2020-05-01 NOTE — PROGRESS NOTES
"Pt had a fistulotomy yesterday and calling with a pain. She is taking tylenol, ibuprofen,and dilaudid. She states her pain hasn't gotten any better. She only has 4 dilaudid pills left and is wondering if she can increase the amt of dilaudid to take.    I spoke with the NP and we are unable to prescribe her more narcotics.     I spoke with the patient and stated to take tylenol, perform warm sitz baths, and that we could prescribe her topical lidocaine. After that the pt stated (through ) \"fuck you\" and hung up.         "

## 2020-05-01 NOTE — TELEPHONE ENCOUNTER
Reason for Call:  Medication or medication refill:    Do you use a [facility]?  Name of the pharmacy and phone number for the current request:  [facility] in [place]    Name of the medication requested: Pain medication     Other request: SHe is in a lot of pain and her surgeon stated if she needed any pain medication to contact her primary.  She would like it today.    Can we leave a detailed message on this number? YES  Phone number patient can be reached at: Cell number on file:    Telephone Information:   Mobile [phone]       Best Time: anytime    Call taken on 5/1/2020 at 12:20 PM by Nikhil Barton

## 2020-05-01 NOTE — TELEPHONE ENCOUNTER
I am unable to work in on Monday. She will have to keep Tuesday apt. I will not prescribe pain medications. She should continue to work with her surgical team for post-op pain.     Zach Rosario PA-C   [facility]

## 2020-05-01 NOTE — TELEPHONE ENCOUNTER
Called patient per ES to cancel telephone visit for today.  Patient had surgery yesterday and was prescribed Hydromorphone (Dilaudid).  Informed patient that she will have to wait till her Colon Surgeon returned her call about pain and medication.  ES will not be able to prescribe anything further at this time.  Oneyda Tapia CMA ([facility])

## 2020-05-01 NOTE — PROGRESS NOTES
Surgical team please clarify - not giving any further today or not any further?     PCP will not be in charge of patient's post op pain. Please clarify with patient if this pain is disproportional to what should be expected and if that is why you are no longer prescribing.     Zach Rosario PA-C  [facility]

## 2020-05-01 NOTE — TELEPHONE ENCOUNTER
[facility]: Nurse Triage Note  SITUATION/BACKGROUND                                                      Katy Islas is a 30 year old female who calls with increased pain temperature and nausea.    Description:  Temp 100.4  Increased rectal pain  Onset/duration:  today  Precip. factors:  Yesterday- Partial fistulotomy  Associated symptoms:  nausea      MEDICATIONS:   Taking medication(s) as prescribed? Yes  Taking over the counter medication(s?) No  Any barriers to taking medication(s) as prescribed?  No  Medication(s) improving/managing symptoms?  No    Allergies:   Allergies   Allergen Reactions     Iohexol Hives     Other reaction(s): Hives  IV contrast; patient states she tolerates contrast if she gets benadryl before  IV contrast; patient states she tolerates contrast if she gets benadryl before     Ativan [Lorazepam] Hives     At the IV site     Baclofen      hives     Bees Hives, Swelling and Difficulty breathing     Caffeine      Contrast Dye      Hives,   Updated 5/10/2016 CT Contrast.     Dilaudid [Hydromorphone] Itching     Iodine Hives     Methocarbamol Swelling     Metoclopramide      Other reaction(s): Tremors  LW Reaction: shaking/sweating     Midazolam      Other reaction(s): Agitation     Monosodium Glutamate      Morphine Other (See Comments)     Difficulty with urination     Nsaids Other (See Comments)     GI BLEED x2     Other (Do Not Use) Other (See Comments)     Xanaflex- pt becomes disoriented and loses bladder control     Reglan [Metoclopramide Hcl] Other (See Comments)     shaking     Soma [Carisoprodol] Visual Disturbance     Sleep walking     Tizanidine      Topamax Other (See Comments)     Topamax [Topiramate] Nausea     Tingling  GI/Vomit     Tramadol      Severe Headache, Seizure Risk     Tylenol W/Codeine [Acetaminophen-Codeine] Nausea and Itching     Tylenol 3     Versed Other (See Comments)     Zolmitriptan      Makes face feel like its twitching      Droperidol Anxiety     Flu Virus Vaccine Rash      Arm swelling       ASSESSMENT      30 year old female s/p Partial fistulotomy yesterday  Her mother calls this afternoon stating she has increased rectal pain, increased temperature and nausea.  She has dilaudid for pain but it causes itching.    Doesn't appear that she has been taking pain meds consistently.  Mom states she last took ibuprofen an hour and a half ago.  Her temperature about 1/2 hour ago was 100.4.  She had not checked it earlier  Her mom states she has been nauseated today.  Not eating or drinking much.      Encouraged her to take ibuprofen and alternate with tylenol-consistently. Must take food with ibuprofen to prevent upset stomach   (Has been taking on an empty stomach.). Continue to take sitz baths at least 3 x day..  Push fluids especially water to prevent dehydration.  Continue to monitor your temperature-if it increases to above 101.0 -call the oncall MD over the weekend    Will forward to the surgery clinic pool to call back with any further recommendations   They may not be able to follow-up until Monday     RECOMMENDATION/PLAN                                                      RECOMMENDED DISPOSITION:  See above  Will comply with recommendation: Yes    If further questions/concerns or if symptoms do not improve, worsen or new symptoms develop, call your PCP or [phone] to talk with the Resident on call, as soon as possible.    Guideline used: post op problem  Telephone Triage Protocols for Nurses, Fifth Edition, Telma Barker, RN, RN

## 2020-05-01 NOTE — TELEPHONE ENCOUNTER
Spoke with [facility] this morning (10:41 AM).  Nurse verified that there was a telephone call from Katy that has not yet been responded to but has been forwarded to the specialist.  She agreed that we should not go forward with prescribing any medication until this has been responded to.      Of note  reviewed 05/01/20:  04/30/2020 Hydromorphone 2 mg #10 Dr. Sanchez  04/28/2020 Oxycodone-APAP 5-325 #12 Dr. Gallegos  04/23/2020 Hydromorphone 2 mg #10 Dr. Boles  04/19/2020 Hydromorophone 2 mg #12 LINETTE GanC

## 2020-05-01 NOTE — TELEPHONE ENCOUNTER
"Reviewed Colon and Rectal telephone encounter. They noted the following:  \"Patient had fistulotomy yesterday and calling with a pain.  She is taking tylenol, ibuprofen and dilaudid.  She states her pain hasn't gotten any better.  She only has 4 dilaudid pills left and is wondering if she can increase the amt of dilaudid to take.      I spoke with the NP and we are unable to prescribe her more narcotics.     I spoke with the patient and stated to take tylenol, perform warm sitz baths, and that we could prescribe her topical lidocaine.  After that the patient stated (through ) \"fuck you\" and hung up.\"      I would recommend if the surgeons don't feel the medication is warranted at this time that she try to spread out her Dilaudid doses over the next 2 days and continue with tylenol/ibuprofen and contact them to have them prescribe the topical lidocaine which can be helpful. It also seems like in general she is stating that the Dilaudid has done nothing for her therefore I would not refill this medication, nor do I feel comfortable increasing her dose.  If severe pain she could be seen in the ED but I highly recommend following recommendations from Surgeon's office.      Wilson Ibarra PA-C    "

## 2020-05-01 NOTE — TELEPHONE ENCOUNTER
Patient called, having a lot of back and rectal pain today. (See other encounter and mychart encounter from today)    Only given dilaudid x 10 tabs by surgeon - isn't helping at all  Has already tried lidocaine ointment  Taken tylenol  Sits baths 4-5 times today    Patient was scheduled with ES for pain control, instructed to contact her surgeon's office, and visit cancelled.  ES provided recs for managing pain as well - patient states she is following her recs, not improving.    Patient states she is frustrated and doesn't know what to do.  Explained that writer is aware of her situation.  Reviewed ES' recs with her again.  - If pain is severe and not manageable, she should go to the ED.  - Pt swears at writer, then states she understands these recommendations.  - she isn't sure she'll go to the ED, but is aware that is her option for severe pain control    Pt then requested a follow up appt for next week with her PCP as a chronic back pain and post op rectal pain follow up, via telephone.    Scheduled first available, Tuesday 930 am.  She requests a work in on Monday. Agreed to send to her PCP and would contact her Monday if we are able to work her in Monday.  She verbalizes understanding and agreement with this plan.    Routed to CD for work in request.  Marianna Layne, EDYTAN, RN, PHN

## 2020-05-02 ENCOUNTER — NURSE TRIAGE (OUTPATIENT)
Dept: NURSING | Facility: CLINIC | Age: 30
End: 2020-05-02

## 2020-05-02 ENCOUNTER — HOSPITAL ENCOUNTER (EMERGENCY)
Facility: CLINIC | Age: 30
Discharge: LEFT BEFORE DISCHARGE | End: 2020-05-02
Attending: EMERGENCY MEDICINE | Admitting: EMERGENCY MEDICINE
Payer: MEDICARE

## 2020-05-02 VITALS
WEIGHT: 191.58 LBS | RESPIRATION RATE: 16 BRPM | HEART RATE: 111 BPM | OXYGEN SATURATION: 100 % | TEMPERATURE: 98.3 F | HEIGHT: 62 IN | SYSTOLIC BLOOD PRESSURE: 114 MMHG | BODY MASS INDEX: 35.25 KG/M2 | DIASTOLIC BLOOD PRESSURE: 63 MMHG

## 2020-05-02 DIAGNOSIS — R10.84 ABDOMINAL PAIN, GENERALIZED: ICD-10-CM

## 2020-05-02 PROCEDURE — 99282 EMERGENCY DEPT VISIT SF MDM: CPT | Performed by: EMERGENCY MEDICINE

## 2020-05-02 PROCEDURE — 99284 EMERGENCY DEPT VISIT MOD MDM: CPT | Mod: Z6 | Performed by: EMERGENCY MEDICINE

## 2020-05-02 ASSESSMENT — ENCOUNTER SYMPTOMS
COLOR CHANGE: 0
WEAKNESS: 0
NECK PAIN: 0
NAUSEA: 1
FATIGUE: 0
DYSURIA: 0
CONSTIPATION: 0
DIARRHEA: 0
SORE THROAT: 0
BACK PAIN: 0
DIZZINESS: 0
ARTHRALGIAS: 0
CONFUSION: 0
CHILLS: 0
VOMITING: 0
PALPITATIONS: 0
ABDOMINAL DISTENTION: 0
FEVER: 0
COUGH: 0
FREQUENCY: 0
ABDOMINAL PAIN: 1
SHORTNESS OF BREATH: 0
CHEST TIGHTNESS: 0
DIFFICULTY URINATING: 0
EYE PAIN: 0
HEADACHES: 0
MYALGIAS: 0
RECTAL PAIN: 1

## 2020-05-02 ASSESSMENT — MIFFLIN-ST. JEOR: SCORE: 1537.25

## 2020-05-02 NOTE — ED AVS SNAPSHOT
[facility], [place], Emergency Department  [address] [address]  Phone:  [phone]                                    Katy Islas   MRN: [id number]    Department:  [facility], Emergency Department   Date of Visit:  5/2/2020           After Visit Summary Signature Page    I have received my discharge instructions, and my questions have been answered. I have discussed any challenges I see with this plan with the nurse or doctor.    ..........................................................................................................................................  Patient/Patient Representative Signature      ..........................................................................................................................................  Patient Representative Print Name and Relationship to Patient    ..................................................               ................................................  Date                                   Time    ..........................................................................................................................................  Reviewed by Signature/Title    ...................................................              ..............................................  Date                                               Time          22EPIC Rev 08/18

## 2020-05-03 NOTE — ED NOTES
MD reported to RN that pt became agitated when care plan was discussed. Pt stated to MD that they were going to leave AMA. Pt not in room or ED area when RN went to give pt paperwork to sign to leave AMA.

## 2020-05-03 NOTE — DISCHARGE INSTRUCTIONS
You have refused all workup and treatment in the emergency department.  You are leaving against medical advice.  You should follow up with your doctors for post-surgical needs and management of your pain.

## 2020-05-03 NOTE — ED PROVIDER NOTES
ED Provider Note  [facility]      History     Chief Complaint   Patient presents with     Fever     Abdominal Pain     The history is provided by the patient and medical records.     Katy Islas is a 30 year old female with a past medical history significant for Crohn's disease, anorectal fistula, grand mal seizure disorder who is s/p parietal fistulotomy who presents to the Emergency Department for evaluation of abdominal pains, and fevers.  Patient was recently admitted at the [facility] on 4/30/2020 and underwent a parietal fistulotomy on 4/30/2020.  Per surgical note the patient tolerated the procedure well.  Patient was also seen at [facility] on 5/1/2020 and was requesting narcotic pain medications.  At this time the physician refused to give the patient more narcotics in which the patient stormed out of the room.  Patient states that the Dilaudid tablets prescribed by the surgeon are not helping tolerate her pain and the patient states she has already tried lidocaine ointment with no resolution of symptoms.    Patient presents the ED today for evaluation of abdominal pain and subjective fevers.  Patient notes increased pain in her abdomen and rectum since yesterday.  Patient also endorses nausea, and 3 episodes of vomiting today.  Patient states that she checked her temperature at home earlier today and noted it to be as high as 101.7.  Patient states the majority of her abdominal pain is left-sided and over her surgical site.  Patient states she has not passed any BMs since her surgery.  She states she is currently on stool softeners including Metamucil and MiraLAX.  Patient states she has been urinating okay but has been experiencing pain when attempting to pass bowel movements.  No other symptoms noted.    Past Medical History  Past Medical History:   Diagnosis Date     Abdominal pain 10/31- 11/4/2005    [facility] admit for Crohn's      Allergic rhinitis, cause unspecified     Allergic rhinitis     Arthritis      C. difficile diarrhea     Past, no current diarrhea.     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, without mention of intractable epilepsy     pseudoseizures diagnosed after extensive neurologic eval     Migraine 07/21/12    D/C 07/22/12-[facility]     Migraine, unspecified, without mention of intractable migraine without mention of status migrainosus     Migraine     Mild intermittent asthma     mild intermittent     Mycoplasma infection in conditions classified elsewhere and of unspecified site      Other chronic pain     Back pain for 6 years     Renal disease     Kidney stones     Unspecified hearing loss     congenital hearing loss     Past Surgical History:   Procedure Laterality Date     AS REMOVAL OF COCCYX  10/18/2017     C FUSION OF SACROILIAC JOINT  10/26/2018     COLONOSCOPY  7/1/2009    [facility] [place]     COLONOSCOPY N/A 7/17/2019    Procedure: Colonoscopy, With Polypectomy And Biopsy;  Surgeon: Edwin Conde MD;  Location:  OR     COLONOSCOPY WITH CO2 INSUFFLATION N/A 7/17/2019    Procedure: COLONOSCOPY, WITH CO2 INSUFFLATION;  Surgeon: Edwin Conde MD;  Location: [facility] OR     COMBINED ESOPHAGOSCOPY, GASTROSCOPY, DUODENOSCOPY (EGD) WITH CO2 INSUFFLATION N/A 4/12/2019    Procedure: COMBINED ESOPHAGOSCOPY, GASTROSCOPY, DUODENOSCOPY (EGD) WITH CO2 INSUFFLATION;  Surgeon: Edwin Conde MD;  Location:  OR      ESOPHAGOSCOPY, GASTROSCOPY, DUODENOSCOPY (EGD), COMBINED N/A 4/12/2019    Procedure: Combined Esophagoscopy, Gastroscopy, Duodenoscopy (Egd), Biopsy Single Or Multiple;  Surgeon: Edwin Conde MD;  Location: [facility] OR     FISTULOTOMY RECTUM N/A 4/30/2020    Procedure: EXAM UNDER ANESTHESIA, ANUS, parital fistulotomy;  Surgeon: Valentin Sanchez MD;  Location: [facility] OR     FUSION SPINE POSTERIOR MINIMALLY INVASIVE ONE LEVEL N/A 2/23/2017    Procedure: FUSION SPINE POSTERIOR MINIMALLY INVASIVE ONE LEVEL;  L4-5 Oblique Lateral Lumbar Interbody Fusion   Epidural steroid injection.   Transpedicular Bone marrow aspiration;  Surgeon: Jeniffer Eugene MD;  Location: [facility] OR     HC COLONOSCOPY THRU STOMA WITH BIOPSY  10/29/2003    Impression is that of normal appearing colonoscopy, without evidence of rectal bleeding.     HC COLONOSCOPY THRU STOMA, DIAGNOSTIC  10/00    normal     HC COLONOSCOPY THRU STOMA, DIAGNOSTIC  Oct 2009    Dr López- normal     HC EEG AWAKE AND SLEEP      abnormal     HC MRI BRAIN W/O CONTRAST  12/00    normal     HC REMOVAL GALLBLADDER  8/5/2009    [facility].     HC UGI ENDOSCOPY DIAG W BIOPSY  11/11/09    Normal esophagus     HC UGI ENDOSCOPY, SIMPLE EXAM  7/00, 10/00    mild chronic esophagitis and duodenitis, neg H pylori     HC UGI ENDOSCOPY, SIMPLE EXAM  01/20/2005    Esophagogastroduodenoscopy, colonoscopy with biopsies.  [facility] UGI ENDOSCOPY, SIMPLE EXAM  7/1/2009    [facility].      UGI ENDOSCOPY, SIMPLE EXAM  11/11/2009    attempted upper GI, pt. could not tolerate procedure:[facility]     ORTHOPEDIC SURGERY  October 19,2011    diskectomy L4-L5     acetaminophen (TYLENOL) 500 MG tablet  albuterol (PROAIR HFA/PROVENTIL HFA/VENTOLIN HFA) 108 (90 Base) MCG/ACT inhaler  albuterol (PROVENTIL) (2.5 MG/3ML) 0.083% neb solution  alum & mag hydroxide-simethicone (MAALOX ADVANCED MAX ST) 400-400-40 MG/5ML  SUSP suspension  ARIPiprazole (ABILIFY) 15 MG tablet  aspirin (ASA) 81 MG tablet  azelastine (OPTIVAR) 0.05 % ophthalmic solution  buPROPion (WELLBUTRIN XL) 150 MG 24 hr tablet  busPIRone (BUSPAR) 15 MG tablet  clonazePAM (KLONOPIN) 1 MG tablet  cyclobenzaprine (FLEXERIL) 10 MG tablet  diphenhydrAMINE (BENADRYL) 50 MG capsule  DULoxetine (CYMBALTA) 60 MG EC capsule  EPINEPHrine (ANY BX GENERIC EQUIV) 0.3 MG/0.3ML injection 2-pack  fexofenadine (ALLEGRA) 180 MG tablet  gabapentin (NEURONTIN) 300 MG capsule  HYDROmorphone (DILAUDID) 2 MG tablet  HYDROmorphone (DILAUDID) 2 MG tablet  lidocaine (XYLOCAINE) 2 % solution  losartan (COZAAR) 100 MG tablet  medical cannabis (Patient's own supply)  medroxyPROGESTERone (DEPO-PROVERA) 150 MG/ML IM injection  methocarbamol (ROBAXIN) 750 MG tablet  metoprolol succinate ER (TOPROL-XL) 200 MG 24 hr tablet  NONFORMULARY  ondansetron (ZOFRAN-ODT) 8 MG ODT tab  oxyCODONE-acetaminophen (PERCOCET) 5-325 MG tablet  pantoprazole (PROTONIX) 40 MG EC tablet  polyethylene glycol (MIRALAX) 17 g packet  prazosin (MINIPRESS) 1 MG capsule  prochlorperazine (COMPAZINE) 10 MG tablet  rizatriptan (MAXALT-MLT) 5 MG ODT  sucralfate (CARAFATE) 1 GM tablet  valACYclovir (VALTREX) 1000 mg tablet      Allergies   Allergen Reactions     Iohexol Hives     Other reaction(s): Hives  IV contrast; patient states she tolerates contrast if she gets benadryl before  IV contrast; patient states she tolerates contrast if she gets benadryl before     Ativan [Lorazepam] Hives     At the IV site     Baclofen      hives     Bees Hives, Swelling and Difficulty breathing     Caffeine      Contrast Dye      Hives,   Updated 5/10/2016 CT Contrast.     Dilaudid [Hydromorphone] Itching     Iodine Hives     Methocarbamol Swelling     Metoclopramide      Other reaction(s): Tremors  LW Reaction: shaking/sweating     Midazolam      Other reaction(s): Agitation     Monosodium Glutamate      Morphine Other (See Comments)      Difficulty with urination     Nsaids Other (See Comments)     GI BLEED x2     Other (Do Not Use) Other (See Comments)     Xanaflex- pt becomes disoriented and loses bladder control     Reglan [Metoclopramide Hcl] Other (See Comments)     shaking     Soma [Carisoprodol] Visual Disturbance     Sleep walking     Tizanidine      Topamax Other (See Comments)     Topamax [Topiramate] Nausea     Tingling  GI/Vomit     Tramadol      Severe Headache, Seizure Risk     Tylenol W/Codeine [Acetaminophen-Codeine] Nausea and Itching     Tylenol 3     Versed Other (See Comments)     Zolmitriptan      Makes face feel like its twitching     Droperidol Anxiety     Flu Virus Vaccine Rash      Arm swelling     Past medical history, past surgical history, medications, and allergies were reviewed with the patient. Additional pertinent items: None    Family History  Family History   Problem Relation Age of Onset     Gastrointestinal Disease Brother         severe Crohn's     Neurologic Disorder Brother         Seizures post head injury     Depression Brother      Substance Abuse Brother      Genitourinary Problems Father         kidney stones     Diabetes Father      Heart Disease Father         Open heart surgery     Breast Cancer Maternal Grandmother      Parkinsonism Maternal Grandmother      Cerebrovascular Disease Paternal Grandmother      Cancer Maternal Grandfather         Lung     Cardiovascular Paternal Grandfather         Heart Attack     Substance Abuse Mother         in recovery x1 year      Lung Cancer Paternal Uncle      Cancer Paternal Uncle      Lung Cancer Maternal Uncle      Family history was reviewed with the patient. Additional pertinent items: None    Social History  Social History     Tobacco Use     Smoking status: Current Every Day Smoker     Packs/day: 0.25     Years: 5.00     Pack years: 1.25     Types: Cigarettes     Smokeless tobacco: Never Used   Substance Use Topics     Alcohol use: Not Currently     Comment:  "Not since last July 2018     Drug use: Yes     Types: Marijuana     Comment: Medical Marijuana currently-- More CBD      Social history was reviewed with the patient. Additional pertinent items: None    Review of Systems   Constitutional: Negative for chills, fatigue and fever.   HENT: Negative for congestion and sore throat.    Eyes: Negative for pain and visual disturbance.   Respiratory: Negative for cough, chest tightness and shortness of breath.    Cardiovascular: Negative for chest pain and palpitations.   Gastrointestinal: Positive for abdominal pain, nausea and rectal pain. Negative for abdominal distention, constipation, diarrhea and vomiting.   Genitourinary: Negative for difficulty urinating, dysuria, frequency and urgency.   Musculoskeletal: Negative for arthralgias, back pain, myalgias and neck pain.   Skin: Negative for color change and rash.   Neurological: Negative for dizziness, weakness and headaches.   Psychiatric/Behavioral: Negative for confusion.     A complete review of systems was performed with pertinent positives and negatives noted in the HPI, and all other systems negative.    Physical Exam   BP: 114/63  Pulse: 111  Temp: 98.3  F (36.8  C)  Resp: 16  Height: 157.5 cm (5' 2\")  Weight: 86.9 kg (191 lb 9.3 oz)  SpO2: 100 %  Physical Exam  Vitals signs and nursing note reviewed.   Constitutional:       General: She is not in acute distress.     Appearance: Normal appearance. She is not ill-appearing or toxic-appearing.   HENT:      Head: Normocephalic and atraumatic.      Nose: Nose normal.      Mouth/Throat:      Mouth: Mucous membranes are moist.   Eyes:      Pupils: Pupils are equal, round, and reactive to light.   Neck:      Musculoskeletal: Normal range of motion. No neck rigidity.   Cardiovascular:      Rate and Rhythm: Normal rate.      Pulses: Normal pulses.      Heart sounds: Normal heart sounds.   Pulmonary:      Effort: Pulmonary effort is normal. No respiratory distress.      " "Breath sounds: Normal breath sounds.   Abdominal:      General: Abdomen is flat. There is no distension.      Tenderness: There is abdominal tenderness. There is no right CVA tenderness, guarding or rebound.   Genitourinary:     Comments: Post surgical area c/d/i.  No anal fissures.  No cellultis/fluctuance  Musculoskeletal: Normal range of motion.         General: No swelling or deformity.   Skin:     General: Skin is warm.      Capillary Refill: Capillary refill takes less than 2 seconds.   Neurological:      Mental Status: She is alert and oriented to person, place, and time.   Psychiatric:         Mood and Affect: Mood normal.         ED Course   11:25 PM  The patient was seen and examined by Bert Saxena DO in Room ED36.        Results for orders placed or performed during the hospital encounter of 04/30/20   HCG qualitative urine     Status: None   Result Value Ref Range    HCG Qual Urine Negative NEG^Negative   Glucose by meter     Status: None   Result Value Ref Range    Glucose 74 70 - 99 mg/dL     Medications - No data to display     Assessments & Plan (with Medical Decision Making)   Pt presented with the complaint of generalized abdominal pain, rectal pain, fever.  On arrival, she appears non-toxic.  She is tachycardic to 111, o/w normal vital signs.  Her abdomen is diffusely tender w/o peritoneal signs.  Her anus is without signs of infection/wound, post surgical area is c/d/i.      Ddx: UTI, post surgical infection, gastroenteritis, sbo  Plan:  CBC, CMP, UA, Lipase, anti-emetic, non narcotic pain medication    When pt was informed she would not be receiving narcotic pain medication, she refused any additional treatment and/or workup.  She states \"if I cant get medicine to treat my pain, I want to go home\".  Pt has a well documented history of narcotic seeking behavior and I do not want to perpetuate that at this time.  Pt offered the plan above and she has decided to leave against medical advice.  All " risks including death and disability discussed with her.  She eloped prior to signing paperwork.            I have reviewed the nursing notes. I have reviewed the findings, diagnosis, plan and need for follow up with the patient.    Discharge Medication List as of 5/2/2020 11:50 PM          Final diagnoses:   Abdominal pain, generalized   IObdulio, am serving as a trained medical scribe to document services personally performed by Bert Saxena DO, based on the provider's statements to me.      I, Bert Saxena DO, was physically present and have reviewed and verified the accuracy of this note documented by Obdulio Milan.    Emergency Medicine   [facility], [place], EMERGENCY DEPARTMENT  5/2/2020     Bert Saxena DO  05/03/20 0106

## 2020-05-03 NOTE — TELEPHONE ENCOUNTER
Partial anal fistulotomy 4/30 (2 days ago). Notes reviewed from yesterday. Pt c/o fever, T 100.7 orally and surgical pain 6/10. Taking ibuprofen alternating w/ Tylenol. Took ibuprofen 2 hr ago w/ little to no pain relief. Doing sitz baths 3x/day. Vomiting x3 today even though taking ibuprofen w/ food. Out of Dilaudid prescribed post-op. Triaged to provider eval w/i 4 hrs. Advised ED as this is only open facility on Sat after 9pm.       Disposition/Instructions    Patient to go to ED and follow protocol based instructions. Follow System Ambulatory Workflow for COVID 19.             Reason for Disposition    Fever > 100.5 F (38.1 C)    Additional Information    Negative: [1] Major abdominal surgical incision AND [2] wound gaping open AND [3] visible internal organs    Negative: Sounds like a life-threatening emergency to the triager    Negative: [1] Bleeding from incision AND [2] won't stop after 10 minutes of direct pressure    Negative: [1] Widespread rash AND [2] bright red, sunburn-like    Negative: Severe pain in the incision    Negative: [1] Suture came out early AND [2] wound gaping AND [3] < 48 hours since sutures placed    Negative: [1] Incision gaping open AND [2] length of opening > 2 inches (5 cm)    Negative: Patient sounds very sick or weak to the triager    Negative: Sounds like a serious complication to the triager    Protocols used: POST-OP INCISION SYMPTOMS-A-AH

## 2020-05-04 ENCOUNTER — VIRTUAL VISIT (OUTPATIENT)
Dept: SURGERY | Facility: CLINIC | Age: 30
End: 2020-05-04
Payer: MEDICARE

## 2020-05-04 ENCOUNTER — PATIENT OUTREACH (OUTPATIENT)
Dept: CARE COORDINATION | Facility: CLINIC | Age: 30
End: 2020-05-04

## 2020-05-04 ENCOUNTER — TELEPHONE (OUTPATIENT)
Dept: FAMILY MEDICINE | Facility: CLINIC | Age: 30
End: 2020-05-04

## 2020-05-04 ENCOUNTER — MYC MEDICAL ADVICE (OUTPATIENT)
Dept: FAMILY MEDICINE | Facility: OTHER | Age: 30
End: 2020-05-04

## 2020-05-04 ENCOUNTER — MYC MEDICAL ADVICE (OUTPATIENT)
Dept: CARE COORDINATION | Facility: CLINIC | Age: 30
End: 2020-05-04

## 2020-05-04 VITALS — WEIGHT: 191.58 LBS | BODY MASS INDEX: 35.25 KG/M2 | HEIGHT: 62 IN

## 2020-05-04 DIAGNOSIS — H91.93 BILATERAL HEARING LOSS, UNSPECIFIED HEARING LOSS TYPE: Primary | ICD-10-CM

## 2020-05-04 DIAGNOSIS — K60.50 ANORECTAL FISTULA: ICD-10-CM

## 2020-05-04 DIAGNOSIS — F32.0 MILD MAJOR DEPRESSION (H): ICD-10-CM

## 2020-05-04 DIAGNOSIS — G89.4 CHRONIC PAIN SYNDROME: ICD-10-CM

## 2020-05-04 DIAGNOSIS — F41.9 ANXIETY: ICD-10-CM

## 2020-05-04 DIAGNOSIS — Z09 FOLLOW-UP EXAMINATION FOLLOWING SURGERY: Primary | ICD-10-CM

## 2020-05-04 ASSESSMENT — MIFFLIN-ST. JEOR: SCORE: 1537.26

## 2020-05-04 NOTE — TELEPHONE ENCOUNTER
Please review referral. Please route back to reception pool #[id number].     Thank you,    Kenzie Mendoza    [facility]

## 2020-05-04 NOTE — PROGRESS NOTES
Clinic Care Coordination Contact    Dear PCP,   Patient was at [facility] 5/2/20 for generalized abdominal pain. Narcotics were not warranted and patient left AMA. patient has well documented drug seeking behavior. She is not on medical assistance therefore we cannot suggest the [place] restricted recipient program to help get an established care team for patient.   [place] offers the [facility] referral.  [facility] serves adults 18 years and older that have complex medical issues with a mental health overlay that are difficult to manage well in a traditional primary care setting. Patients referred to IPC meet at least one of the following criteria:    - Complex medical issues with a mental health overlay  - Patients who are difficult to manage in the traditional primary care setting due to mental health issues    This is a suggestion as patient continues to seek care for her symptoms. CC RN will defer to PCP's advice.     CC RN will not reach out to patient at this time.     RADHA Otto RN Clinic Care Coordinator   [place], [place], [place]  Phone: [phone]

## 2020-05-04 NOTE — NURSING NOTE
"Chief Complaint   Patient presents with     Surgical Followup       Vitals:    05/04/20 0906   Weight: 191 lb 9.3 oz   Height: 5' 2\"       Body mass index is 35.04 kg/m .      Chau Edward, EMT              "

## 2020-05-04 NOTE — PROGRESS NOTES
"Colon and Rectal Surgery Postoperative Clinic Note    RE: Katy Islas  : 1990  TESSA: 2020    Katy Islas is a 30 year old female who is being evaluated via a billable telephone visit.      The patient has been notified of following:     \"This telephone visit will be conducted via a call between you and your physician/provider. We have found that certain health care needs can be provided without the need for a physical exam.  This service lets us provide the care you need with a short phone conversation.  If a prescription is necessary we can send it directly to your pharmacy.  If lab work is needed we can place an order for that and you can then stop by our lab to have the test done at a later time.    If during the course of the call the physician/provider feels a telephone visit is not appropriate, you will not be charged for this service.\"     Patient has given verbal consent for telephone visit? Yes    Phone call duration: 12 minutes    Katy Islas is a 30 year old female with PMH of possible Crohn's disease, grand mal seizure disorder, and congenital hearing loss with anorectal fistula who is now status post examination under anesthesia with partial fistulotomy on 2020 with Dr. Sanchez.    Interval history: Katy presented contacted our clinic the day after surgery requesting further narcotics for pain. This request was declined as she was given a prescription for dilaudid after surgery. She went to the ER over the weekend with rectal and abdominal pain but left AMA when narcotic request was declined.   Visit today was via  on the phone. Katy states that her pain is a lot better. The sitz baths are helping. She has a temp of 99.8 max. No nausea or vomiting. She has had several soft bowel movements without any increased pain. Some mucous and foul smelling drainage.    Assessment/Plan:  30 year old female status post examination under anesthesia with partial fistulotomy " on 4/30/2020 with Dr. Sanchez. Pain has improved and it sounds like she is healing well. Discussed the importance of keeping stools soft. She missed her visit with gastroenterology last week so should reschedule this, although I think this is unlikely Crohn's related. Continue with sitz baths several times a day and can follow up with our clinic as needed.      Medical history:  Past Medical History:   Diagnosis Date     Abdominal pain 10/31- 11/4/2005    [facility] admit for [facility]hn[facility]     Allergic rhinitis, cause unspecified     Allergic rhinitis     Arthritis      C. difficile diarrhea     Past, no current diarrhea.     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, without mention of intractable epilepsy     pseudoseizures diagnosed after extensive neurologic eval     Migraine 07/21/12    D/C 07/22/12-[facility]     Migraine, unspecified, without mention of intractable migraine without mention of status migrainosus     Migraine     Mild intermittent asthma     mild intermittent     Mycoplasma infection in conditions classified elsewhere and of unspecified site      Other chronic pain     Back pain for 6 years     Renal disease     Kidney stones     Unspecified hearing loss     congenital hearing loss       Surgical history:  Past Surgical History:   Procedure Laterality Date     AS REMOVAL OF COCCYX  10/18/2017     C FUSION OF SACROILIAC JOINT  10/26/2018     COLONOSCOPY  7/1/2009    [facility], [place]     COLONOSCOPY N/A 7/17/2019    Procedure:  Colonoscopy, With Polypectomy And Biopsy;  Surgeon: Edwin Conde MD;  Location: [facility]     COLONOSCOPY WITH CO2 INSUFFLATION N/A 7/17/2019    Procedure: COLONOSCOPY, WITH CO2 INSUFFLATION;  Surgeon: Edwin Conde MD;  Location: [facility]     COMBINED ESOPHAGOSCOPY, GASTROSCOPY, DUODENOSCOPY (EGD) WITH CO2 INSUFFLATION N/A 4/12/2019    Procedure: COMBINED ESOPHAGOSCOPY, GASTROSCOPY, DUODENOSCOPY (EGD) WITH CO2 INSUFFLATION;  Surgeon: Edwin Conde MD;  Location: [facility] OR     ESOPHAGOSCOPY, GASTROSCOPY, DUODENOSCOPY (EGD), COMBINED N/A 4/12/2019    Procedure: Combined Esophagoscopy, Gastroscopy, Duodenoscopy (Egd), Biopsy Single Or Multiple;  Surgeon: Edwin Conde MD;  Location: [facility]     FISTULOTOMY RECTUM N/A 4/30/2020    Procedure: EXAM UNDER ANESTHESIA, ANUS, parital fistulotomy;  Surgeon: Valentin Sanchez MD;  Location: [facility] OR     FUSION SPINE POSTERIOR MINIMALLY INVASIVE ONE LEVEL N/A 2/23/2017    Procedure: FUSION SPINE POSTERIOR MINIMALLY INVASIVE ONE LEVEL;  L4-5 Oblique Lateral Lumbar Interbody Fusion   Epidural steroid injection.   Transpedicular Bone marrow aspiration;  Surgeon: Jeniffer Eugene MD;  Location: [facility] OR     HC COLONOSCOPY THRU STOMA WITH BIOPSY  10/29/2003    Impression is that of normal appearing colonoscopy, without evidence of rectal bleeding.     HC COLONOSCOPY THRU STOMA, DIAGNOSTIC  10/00    normal     HC COLONOSCOPY THRU STOMA, DIAGNOSTIC  Oct 2009    Dr López- normal     HC EEG AWAKE AND SLEEP      abnormal     HC MRI BRAIN W/O CONTRAST  12/00    normal     HC REMOVAL GALLBLADDER  8/5/2009    [facility], [place].     HC UGI ENDOSCOPY DIAG W BIOPSY  11/11/09    Normal esophagus     HC UGI ENDOSCOPY, SIMPLE EXAM  7/00, 10/00    mild chronic esophagitis and duodenitis, neg H pylori     HC UGI ENDOSCOPY, SIMPLE EXAM  01/20/2005    Esophagogastroduodenoscopy, colonoscopy with biopsies.  [facility]  UGI ENDOSCOPY, SIMPLE EXAM  7/1/2009    [facility] [place]      UGI ENDOSCOPY, SIMPLE EXAM  11/11/2009    attempted upper GI, pt. could not tolerate procedure:[facility]     ORTHOPEDIC SURGERY  October 19,2011    diskectomy L4-L5       Problem list:  Patient Active Problem List    Diagnosis Date Noted     Abscess of anal and rectal regions 04/27/2020     Priority: Medium     Added automatically from request for surgery [id number]       Rectal pain 04/19/2020     Priority: Medium     Abdominal pain 04/18/2020     Priority: Medium     Morbid obesity (H) 03/10/2020     Priority: Medium     Abnormal uterine bleeding 02/12/2020     Priority: Medium     Added automatically from request for surgery [id number]       Anal fissure 02/04/2020     Priority: Medium     Low hematocrit 02/04/2020     Priority: Medium     Elevated d-dimer 02/04/2020     Priority: Medium     Hypertension goal BP (blood pressure) < 130/80 08/05/2019     Priority: Medium     Bulge of cervical disc without myelopathy 04/24/2019     Priority: Medium     Cervicalgia 04/17/2019     Priority: Medium     Class 1 obesity due to excess calories without serious comorbidity with body mass index (BMI) of 32.0 to 32.9 in adult 04/03/2019     Priority: Medium     Hypophosphatemia 11/01/2018     Priority: Medium     Patellar maltracking, right 09/05/2018     Priority: Medium     Right anterior knee pain 09/05/2018     Priority: Medium     Melanocytic nevus, unspecified location 07/23/2018     Priority: Medium     Dysplastic skin lesion 07/23/2018     Priority: Medium     Iliotibial band syndrome affecting lower leg, right 12/21/2017     Priority: Medium     Chondromalacia of right patellofemoral joint 12/21/2017     Priority: Medium     Upper GI bleed - suspected 07/01/2017     Priority: Medium     Tobacco use disorder 07/01/2017     Priority: Medium     History of pseudoseizure 07/01/2017     Priority: Medium     Requires teletypewriting device  for the deaf (TTD) 03/10/2017     Priority: Medium     Lumbar disc disease with radiculopathy 02/23/2017     Priority: Medium     S/P lumbar fusion 02/23/2017     Priority: Medium     Dr. YOVANI Millan, 2/23/17       Vitamin D deficiency 01/06/2017     Priority: Medium     Atypical mole 01/06/2017     Priority: Medium     Mild persistent asthma without complication 12/02/2016     Priority: Medium     Chronic bilateral low back pain with left-sided sciatica 12/02/2016     Priority: Medium     Bee sting allergy 09/16/2016     Priority: Medium     Gastroesophageal reflux disease without esophagitis 08/26/2016     Priority: Medium     Herpes simplex virus infection 11/12/2015     Priority: Medium     Adjustment disorder with mixed anxiety and depressed mood 10/14/2015     Priority: Medium     Marijuana abuse 02/22/2015     Priority: Medium     Bipolar disorder (H) 01/31/2013     Priority: Medium     Problem list name updated by automated process. Provider to review       Chronic pain 02/09/2012     Priority: Medium     Patient is followed by Aura Rosario PA-C for ongoing prescription of pain medication.  All refills should only be approved by this provider, or covering partner.    Medication(s): Norco    Maximum quantity per month: 70  Clinic visit frequency required: Q 2 months     Controlled substance agreement:   Discussed and signed 4/25/17    Encounter-Level CSA - 01/12/2015:                 Controlled Substance Agreement - Scan on 1/26/2015  9:14 AM : Controlled Medication Agreement-01/12/15 (below)            Pain Clinic evaluation in the past: Yes       Date/Location:   [facility], fall 2016    DIRE Total Score(s):    4/25/2017   Total Score 17       Last [facility] website verification:  done on 4/25/17 by LOVE Maki   [email or web address]           Anxiety 09/22/2011     Priority: Medium     Mild major depression (H) 08/29/2011     Priority: Medium     Mild intermittent asthma  10/12/2009     Priority: Medium     Overview:   Formatting of this note might be different from the original.  Action plan: See letter 4/10/2013   ATAQ:   Asthma Data 4/10/2013   Date completed 4/10/2013   Missed daily activities no = 0   Wake at night no = 0   Believe asthma in control yes = 0   ARIN use yes   Maximum ARIN use in 1 day 1-4 = 0   ATAQ score 0 = well controlled   Asthma ED visits in past 12 mos 0   Asthma hospitalizations in past 12 mos 0     Current Outpatient Prescriptions   Medication Sig     albuterol (PROVENTIL) 0.083 % neb solution Inhale 3 mL via a nebulizer every 4 hours if needed for Shortness Of Breath.        Crohn's colitis (H) 08/04/2009     Priority: Medium     Dysmenorrhea 05/11/2007     Priority: Medium     Benign neoplasm of skin of lower limb, including hip 03/16/2004     Priority: Medium     Migraine      Priority: Medium     Dr. Farrar - Neurology.  Now on Inderal.  Seems to be working.  Follow-up 9/08  Problem list name updated by automated process. Provider to review       Hearing loss      Priority: Medium     Congenital  Problem list name updated by automated process. Provider to review       Allergic rhinitis      Priority: Medium     Problem list name updated by automated process. Provider to review         Medications:  Current Outpatient Medications   Medication Sig Dispense Refill     acetaminophen (TYLENOL) 500 MG tablet Take 2 tablets (1,000 mg) by mouth 3 times daily as needed for mild pain 100 tablet 3     albuterol (PROAIR HFA/PROVENTIL HFA/VENTOLIN HFA) 108 (90 Base) MCG/ACT inhaler Inhale 2 puffs into the lungs every 6 hours as needed for shortness of breath / dyspnea or wheezing 3 Inhaler 3     albuterol (PROVENTIL) (2.5 MG/3ML) 0.083% neb solution Take 1 vial (2.5 mg) by nebulization every 6 hours as needed for shortness of breath / dyspnea or wheezing 50 vial 11     alum & mag hydroxide-simethicone (MAALOX ADVANCED MAX ST) 400-400-40 MG/5ML SUSP suspension Take  15 mLs by mouth every 4 hours as needed for indigestion (mix with lidocaine) 355 mL 11     ARIPiprazole (ABILIFY) 15 MG tablet Take 1 tablet (15 mg) by mouth At Bedtime 90 tablet 3     aspirin (ASA) 81 MG tablet Take 1 tablet (81 mg) by mouth daily 90 tablet 3     azelastine (OPTIVAR) 0.05 % ophthalmic solution Place 1 drop into both eyes 2 times daily 6 mL 11     buPROPion (WELLBUTRIN XL) 150 MG 24 hr tablet Take 1 tablet (150 mg) by mouth every morning 30 tablet 1     busPIRone (BUSPAR) 15 MG tablet Take 1 tablet (15 mg) by mouth 2 times daily 60 tablet 5     clonazePAM (KLONOPIN) 1 MG tablet Take 1 tablet (1 mg) by mouth 2 times daily as needed for anxiety 28 tablet 0     cyclobenzaprine (FLEXERIL) 10 MG tablet Take 1 tablet (10 mg) by mouth 3 times daily as needed for muscle spasms or other (back pain) 90 tablet 3     diphenhydrAMINE (BENADRYL) 50 MG capsule Take 1-2 capsules ( mg) by mouth every 6 hours as needed for itching, allergies or sleep 90 capsule 3     DULoxetine (CYMBALTA) 60 MG EC capsule Take 1 capsule (60 mg) by mouth daily 90 capsule 3     EPINEPHrine (ANY BX GENERIC EQUIV) 0.3 MG/0.3ML injection 2-pack Inject 0.3 mLs (0.3 mg) into the muscle once as needed for anaphylaxis 0.6 mL 3     fexofenadine (ALLEGRA) 180 MG tablet Take 1 tablet (180 mg) by mouth daily 90 tablet 3     gabapentin (NEURONTIN) 300 MG capsule Take 1 capsule (300 mg) by mouth 3 times daily 270 capsule 3     HYDROmorphone (DILAUDID) 2 MG tablet Take 1 tablet (2 mg) by mouth every 4 hours as needed for moderate to severe pain 12 tablet 0     lidocaine (XYLOCAINE) 2 % solution Swish and swallow 15 mLs in mouth every 4 hours as needed for other (GERD) ; Max 8 doses/24 hour period. Mix with 15 mLs Maalox or Mylanta. 100 mL 3     losartan (COZAAR) 100 MG tablet Take 1 tablet (100 mg) by mouth daily 90 tablet 3     medical cannabis (Patient's own supply) (The purpose of this order is to document that the patient reports taking  medical cannabis.  This is not a prescription, and is not used to certify that the patient has a qualifying medical condition.) 0 Information only 0     medroxyPROGESTERone (DEPO-PROVERA) 150 MG/ML IM injection Inject 1 mL (150 mg) into the muscle every 3 months 3 mL 3     methocarbamol (ROBAXIN) 750 MG tablet Take 1 tablet (750 mg) by mouth 3 times daily as needed for muscle spasms 60 tablet 3     metoprolol succinate ER (TOPROL-XL) 200 MG 24 hr tablet Take 1 tablet (200 mg) by mouth daily 90 tablet 1     NONFORMULARY Apply 30 mLs topically 4 times daily       ondansetron (ZOFRAN-ODT) 8 MG ODT tab Take 1 tablet (8 mg) by mouth every 8 hours as needed for nausea 15 tablet 0     oxyCODONE-acetaminophen (PERCOCET) 5-325 MG tablet Take 1-2 tablets by mouth every 4 hours as needed for pain 12 tablet 0     pantoprazole (PROTONIX) 40 MG EC tablet Take 1 tablet (40 mg) by mouth 2 times daily Take 30-60 minutes before a meal. 180 tablet 3     polyethylene glycol (MIRALAX) 17 g packet Take 17 g by mouth 2 times daily 60 packet 0     prazosin (MINIPRESS) 1 MG capsule Take 1 mg by mouth At Bedtime  5     prochlorperazine (COMPAZINE) 10 MG tablet Take 1 tablet (10 mg) by mouth every 6 hours as needed for nausea or vomiting 30 tablet 0     rizatriptan (MAXALT-MLT) 5 MG ODT Take 1-2 tablets (5-10 mg) by mouth at onset of headache for migraine May repeat in 2 hours. Max 6 tablets/24 hours. 18 tablet 3     sucralfate (CARAFATE) 1 GM tablet Take 1 tablet (1 g) by mouth 4 times daily as needed (heartburn) 360 tablet 3     valACYclovir (VALTREX) 1000 mg tablet 2 tabs at onset of mouth sores.  Repeat dose in 12 hours.         Allergies:  Allergies   Allergen Reactions     Iohexol Hives     Other reaction(s): Hives  IV contrast; patient states she tolerates contrast if she gets benadryl before  IV contrast; patient states she tolerates contrast if she gets benadryl before     Ativan [Lorazepam] Hives     At the IV site     Baclofen       hives     Bees Hives, Swelling and Difficulty breathing     Caffeine      Contrast Dye      Hives,   Updated 5/10/2016 CT Contrast.     Dilaudid [Hydromorphone] Itching     Iodine Hives     Methocarbamol Swelling     Metoclopramide      Other reaction(s): Tremors  LW Reaction: shaking/sweating     Midazolam      Other reaction(s): Agitation     Monosodium Glutamate      Morphine Other (See Comments)     Difficulty with urination     Nsaids Other (See Comments)     GI BLEED x2     Other (Do Not Use) Other (See Comments)     Xanaflex- pt becomes disoriented and loses bladder control     Reglan [Metoclopramide Hcl] Other (See Comments)     shaking     Soma [Carisoprodol] Visual Disturbance     Sleep walking     Tizanidine      Topamax Other (See Comments)     Topamax [Topiramate] Nausea     Tingling  GI/Vomit     Tramadol      Severe Headache, Seizure Risk     Tylenol W/Codeine [Acetaminophen-Codeine] Nausea and Itching     Tylenol 3     Versed Other (See Comments)     Zolmitriptan      Makes face feel like its twitching     Droperidol Anxiety     Flu Virus Vaccine Rash      Arm swelling       Family history:  Family History   Problem Relation Age of Onset     Gastrointestinal Disease Brother         severe Crohn's     Neurologic Disorder Brother         Seizures post head injury     Depression Brother      Substance Abuse Brother      Genitourinary Problems Father         kidney stones     Diabetes Father      Heart Disease Father         Open heart surgery     Breast Cancer Maternal Grandmother      Parkinsonism Maternal Grandmother      Cerebrovascular Disease Paternal Grandmother      Cancer Maternal Grandfather         Lung     Cardiovascular Paternal Grandfather         Heart Attack     Substance Abuse Mother         in recovery x1 year      Lung Cancer Paternal Uncle      Cancer Paternal Uncle      Lung Cancer Maternal Uncle        Social history:  Social History     Tobacco Use     Smoking status: Current Every  "Day Smoker     Packs/day: 0.25     Years: 5.00     Pack years: 1.25     Types: Cigarettes     Smokeless tobacco: Never Used   Substance Use Topics     Alcohol use: Not Currently     Comment: Not since last July 2018     Marital status: single.    Nursing Notes:   Chau Edward EMT  5/4/2020  9:15 AM  Signed  Chief Complaint   Patient presents with     Surgical Followup       Vitals:    05/04/20 0906   Weight: 191 lb 9.3 oz   Height: 5' 2\"       Body mass index is 35.04 kg/m .      CELESTINA Murdock APRN, NP-C  Colon and Rectal Surgery  [facility]    This note was created using speech recognition software and may contain unintended word substitutions.    "

## 2020-05-04 NOTE — PROGRESS NOTES
Clinic Care Coordination Contact    [facility] message sent to patient with referral information.     Plan: patient will attend her scheduled follow up. CC RN will make no further follow up.    RADHA Otto RN Clinic Care Coordinator   [place], [place], [place]  Phone: [phone]

## 2020-05-04 NOTE — TELEPHONE ENCOUNTER
Integrated primary care ordered due to many ED visits. Not discussed with patient.     Patient is deaf, communication with her mainly through MyChart    Chronic back pain - nothing further can be done   Stomach issues - following with GI, partly due to her periods - was going to have hysterectomy but was postponed due to COVID    New rectal abscess, just had surgery last week, colorectal said no more pain medications. I will not prescribe.     [facility] to [facility]      Was supposed to have appointment with Dr. Renata Lucas with [facility] for psychiatry, but not sure if this has happened yet.     Zach Rosario PA-C  [facility]

## 2020-05-05 NOTE — TELEPHONE ENCOUNTER
Patient cancelled her schedule appointment with PCP today.     FYI only to PCP.     CC RN will no longer reach out to patient pro actively per patient request. Despite her showing up on care coordination discharge list due to her elevated readmission risk score and complex medical and mental health.     EDYTA OttoN RN Clinic Care Coordinator   [place], [place], [place]  Phone: [phone]

## 2020-05-06 ENCOUNTER — VIRTUAL VISIT (OUTPATIENT)
Dept: FAMILY MEDICINE | Facility: OTHER | Age: 30
End: 2020-05-06
Payer: MEDICARE

## 2020-05-06 DIAGNOSIS — G89.29 CHRONIC BILATERAL LOW BACK PAIN WITH LEFT-SIDED SCIATICA: ICD-10-CM

## 2020-05-06 DIAGNOSIS — H66.002 ACUTE SUPPURATIVE OTITIS MEDIA OF LEFT EAR WITHOUT SPONTANEOUS RUPTURE OF TYMPANIC MEMBRANE, RECURRENCE NOT SPECIFIED: ICD-10-CM

## 2020-05-06 DIAGNOSIS — M54.42 CHRONIC BILATERAL LOW BACK PAIN WITH LEFT-SIDED SCIATICA: ICD-10-CM

## 2020-05-06 DIAGNOSIS — M50.30 BULGE OF CERVICAL DISC WITHOUT MYELOPATHY: ICD-10-CM

## 2020-05-06 DIAGNOSIS — J01.90 ACUTE SINUSITIS WITH SYMPTOMS > 10 DAYS: Primary | ICD-10-CM

## 2020-05-06 PROCEDURE — 99442 ZZC PHYSICIAN TELEPHONE EVALUATION 11-20 MIN: CPT | Performed by: PHYSICIAN ASSISTANT

## 2020-05-06 RX ORDER — OFLOXACIN 3 MG/ML
5 SOLUTION AURICULAR (OTIC) 2 TIMES DAILY
Qty: 5 ML | Refills: 0 | Status: SHIPPED | OUTPATIENT
Start: 2020-05-06 | End: 2020-05-18

## 2020-05-06 RX ORDER — CEPHALEXIN 500 MG/1
500 CAPSULE ORAL 2 TIMES DAILY
Qty: 20 CAPSULE | Refills: 0 | Status: SHIPPED | OUTPATIENT
Start: 2020-05-06 | End: 2020-05-18

## 2020-05-07 ENCOUNTER — MYC REFILL (OUTPATIENT)
Dept: FAMILY MEDICINE | Facility: OTHER | Age: 30
End: 2020-05-07

## 2020-05-07 ENCOUNTER — MYC MEDICAL ADVICE (OUTPATIENT)
Dept: FAMILY MEDICINE | Facility: OTHER | Age: 30
End: 2020-05-07

## 2020-05-07 ENCOUNTER — PATIENT OUTREACH (OUTPATIENT)
Dept: SURGERY | Facility: CLINIC | Age: 30
End: 2020-05-07

## 2020-05-07 ENCOUNTER — TELEPHONE (OUTPATIENT)
Dept: SURGERY | Facility: CLINIC | Age: 30
End: 2020-05-07

## 2020-05-07 ENCOUNTER — TELEPHONE (OUTPATIENT)
Dept: FAMILY MEDICINE | Facility: OTHER | Age: 30
End: 2020-05-07

## 2020-05-07 DIAGNOSIS — F32.0 MILD MAJOR DEPRESSION (H): ICD-10-CM

## 2020-05-07 DIAGNOSIS — B37.31 CANDIDIASIS OF VAGINA: Primary | ICD-10-CM

## 2020-05-07 DIAGNOSIS — F31.63 BIPOLAR DISORDER, CURRENT EPISODE MIXED, SEVERE, WITHOUT PSYCHOTIC FEATURES (H): ICD-10-CM

## 2020-05-07 DIAGNOSIS — G89.4 CHRONIC PAIN SYNDROME: ICD-10-CM

## 2020-05-07 DIAGNOSIS — F43.23 ADJUSTMENT DISORDER WITH MIXED ANXIETY AND DEPRESSED MOOD: ICD-10-CM

## 2020-05-07 RX ORDER — METHOCARBAMOL 750 MG/1
750 TABLET, FILM COATED ORAL 3 TIMES DAILY PRN
Qty: 60 TABLET | Refills: 3 | Status: CANCELLED | OUTPATIENT
Start: 2020-05-07

## 2020-05-07 RX ORDER — FLUCONAZOLE 150 MG/1
150 TABLET ORAL ONCE
Qty: 3 TABLET | Refills: 0 | Status: SHIPPED | OUTPATIENT
Start: 2020-05-07 | End: 2020-05-18

## 2020-05-07 RX ORDER — CLONAZEPAM 1 MG/1
1 TABLET ORAL 2 TIMES DAILY PRN
Qty: 28 TABLET | Refills: 0 | Status: SHIPPED | OUTPATIENT
Start: 2020-05-12 | End: 2020-05-26

## 2020-05-07 NOTE — TELEPHONE ENCOUNTER
Her Klonopin is not due until 5/12/20 then. I will date and send.     Zach Rosario PA-C  [facility]

## 2020-05-07 NOTE — TELEPHONE ENCOUNTER
I spoke with pharmacy.   Robaxin was filled today, but does not have any refills on file.   The last file of Clonazepam was 4/28/20.     Ines Roy, RN, BSN

## 2020-05-07 NOTE — TELEPHONE ENCOUNTER
Reason for Call:  Other call back    Detailed comments: Pt called and is wondering if she can use Cortizone cream on her incision down below that itches really bad. She is also wondering if she can get a prescription for a yeast infection. She did have an apt yesterday with her provider. Please advise thank you    Phone Number Patient can be reached at: Home number on file [phone] (home)    Best Time: anytime    Can we leave a detailed message on this number? YES    Call taken on 5/7/2020 at 10:12 AM by Yahaira Watson

## 2020-05-07 NOTE — PROGRESS NOTES
MANUEL using  stated that I see she called and I wanted to call her back to see how she was doing. Gave my call back number.

## 2020-05-08 ENCOUNTER — PATIENT OUTREACH (OUTPATIENT)
Dept: SURGERY | Facility: CLINIC | Age: 30
End: 2020-05-08

## 2020-05-08 NOTE — PROGRESS NOTES
Pt states her pain is getting better. Her incision has been off and on itchy with some yellow mucous. She states no fever. I told her to keep the area clean and dry. She wants to know if she can use any creams on the incision. She also wants to know if she has any restrictions and how long it will take for her to heal. Discussed with NP. Sent pt a mychart.

## 2020-05-12 ENCOUNTER — TELEPHONE (OUTPATIENT)
Dept: FAMILY MEDICINE | Facility: OTHER | Age: 30
End: 2020-05-12

## 2020-05-12 ENCOUNTER — NURSE TRIAGE (OUTPATIENT)
Dept: NURSING | Facility: CLINIC | Age: 30
End: 2020-05-12

## 2020-05-12 NOTE — TELEPHONE ENCOUNTER
Patient is having ongoing vagina pain and swelling, she said it is oozing. She declined a video or a phone visit, she would like to come in and be seen.

## 2020-05-12 NOTE — TELEPHONE ENCOUNTER
LM with TTY to have pt call clinic back. When call is returned give information below.     Deborah Bradley CMA ([facility])

## 2020-05-12 NOTE — TELEPHONE ENCOUNTER
Discussed with Dr. Sanchez about situation. Told pt that the yellow string is a suture and that this will dissolve on its own as the pt heals. She stated understanding.

## 2020-05-12 NOTE — TELEPHONE ENCOUNTER
On 4/30/2020 Pt had Abscess of anal and rectal regions    Since the surgery.   Pt has noticed a yellow string hanging from the area about 3 inches long.       Around the surgical site there is redness, yellowish greenish drainage, warmth, and some pain from the area.  No fever, chills, or hot sweats, noted. Pain level 5/10 on the pain scale.  Pt taking Tylenol 1000 Mg's and Advil 200 Mg's alternating every 4 hours.Relieves some of the pain.    Pt also noticing some stick discharge coming from the vaginal area.   Pt wondering what that is from.  Pt was taking antibiotics for sinus infection.    Pt was going to call her Primary Care Doctor for the vaginal symptoms.   No nausea, vomiting, diarrhea or constipation noted.    Good appetite and drinking plenty of fluids.     Pt does not have a cell phone.  So she is not able to take a picture of the surgical area and send it to the clinic for review.    Please call the Pt back @ [phone] for further assistance.          Kelli Casiano RN  Central Triage Red Flags/Med Refills        Reason for Disposition    [1] Pus or bad-smelling fluid draining from incision AND [2] no fever    Additional Information    Negative: [1] Major abdominal surgical incision AND [2] wound gaping open AND [3] visible internal organs    Negative: Sounds like a life-threatening emergency to the triager    Negative: [1] Bleeding from incision AND [2] won't stop after 10 minutes of direct pressure    Negative: [1] Widespread rash AND [2] bright red, sunburn-like    Negative: Severe pain in the incision    Negative: [1] Suture came out early AND [2] wound gaping AND [3] < 48 hours since sutures placed    Negative: [1] Incision gaping open AND [2] length of opening > 2 inches (5 cm)    Negative: Patient sounds very sick or weak to the triager    Negative: Sounds like a serious complication to the triager    Negative: Fever > 100.5 F (38.1 C)    Negative: [1] Incision looks infected (spreading redness,  pain) AND [2] fever > 99.5 F (37.5 C)    Negative: [1] Incision looks infected (spreading redness, pain) AND [2] large red area (> 2 in. or 5 cm)    Negative: [1] Incision looks infected (spreading redness, pain) AND [2] face wound    Negative: [1] Red streak runs from the incision AND [2] longer than 1 inch (2.5 cm)    Protocols used: POST-OP INCISION SYMPTOMS-A-AH

## 2020-05-12 NOTE — TELEPHONE ENCOUNTER
I recommend she wait to hear from surgeon. We have already treated for yeast, so the only thing it could be would be bacterial vaginosis. If this persists for a few days we could do E-visit with self wet prep.       Zach Rosario PA-C  [facility]

## 2020-05-12 NOTE — TELEPHONE ENCOUNTER
See 5/12/20 triage encounter, which was routed at 0837 to patient's rectal surgeon for recs.    Routed to [facility] for recs.  Marianna Layne, BSN, RN, PHN

## 2020-05-13 ENCOUNTER — HOSPITAL ENCOUNTER (EMERGENCY)
Facility: CLINIC | Age: 30
Discharge: HOME | End: 2020-05-13
Attending: EMERGENCY MEDICINE | Admitting: EMERGENCY MEDICINE
Payer: MEDICARE

## 2020-05-13 ENCOUNTER — NURSE TRIAGE (OUTPATIENT)
Dept: FAMILY MEDICINE | Facility: OTHER | Age: 30
End: 2020-05-13

## 2020-05-13 ENCOUNTER — MYC MEDICAL ADVICE (OUTPATIENT)
Dept: FAMILY MEDICINE | Facility: OTHER | Age: 30
End: 2020-05-13

## 2020-05-13 VITALS
DIASTOLIC BLOOD PRESSURE: 83 MMHG | TEMPERATURE: 99.2 F | WEIGHT: 190 LBS | HEIGHT: 62 IN | OXYGEN SATURATION: 100 % | HEART RATE: 125 BPM | RESPIRATION RATE: 18 BRPM | BODY MASS INDEX: 34.96 KG/M2 | SYSTOLIC BLOOD PRESSURE: 124 MMHG

## 2020-05-13 DIAGNOSIS — R10.9 CHRONIC ABDOMINAL PAIN: ICD-10-CM

## 2020-05-13 DIAGNOSIS — G89.29 CHRONIC ABDOMINAL PAIN: ICD-10-CM

## 2020-05-13 DIAGNOSIS — U07.1 CLINICAL DIAGNOSIS OF COVID-19: ICD-10-CM

## 2020-05-13 LAB
ALBUMIN SERPL-MCNC: 3.7 G/DL (ref 3.4–5)
ALP SERPL-CCNC: 87 U/L (ref 40–150)
ALT SERPL W P-5'-P-CCNC: 36 U/L (ref 0–50)
ANION GAP SERPL CALCULATED.3IONS-SCNC: 7 MMOL/L (ref 3–14)
AST SERPL W P-5'-P-CCNC: 21 U/L (ref 0–45)
BASOPHILS # BLD AUTO: 0 10E9/L (ref 0–0.2)
BASOPHILS NFR BLD AUTO: 0.7 %
BILIRUB SERPL-MCNC: 0.4 MG/DL (ref 0.2–1.3)
BUN SERPL-MCNC: 12 MG/DL (ref 7–30)
CALCIUM SERPL-MCNC: 8.5 MG/DL (ref 8.5–10.1)
CHLORIDE SERPL-SCNC: 110 MMOL/L (ref 94–109)
CO2 SERPL-SCNC: 25 MMOL/L (ref 20–32)
CREAT SERPL-MCNC: 0.74 MG/DL (ref 0.52–1.04)
DIFFERENTIAL METHOD BLD: ABNORMAL
EOSINOPHIL NFR BLD AUTO: 1.2 %
ERYTHROCYTE [DISTWIDTH] IN BLOOD BY AUTOMATED COUNT: 11.9 % (ref 10–15)
GFR SERPL CREATININE-BSD FRML MDRD: >90 ML/MIN/{1.73_M2}
GLUCOSE SERPL-MCNC: 95 MG/DL (ref 70–99)
HCT VFR BLD AUTO: 39.5 % (ref 35–47)
HGB BLD-MCNC: 13.9 G/DL (ref 11.7–15.7)
IMM GRANULOCYTES # BLD: 0 10E9/L (ref 0–0.4)
IMM GRANULOCYTES NFR BLD: 0.3 %
LYMPHOCYTES # BLD AUTO: 1.8 10E9/L (ref 0.8–5.3)
LYMPHOCYTES NFR BLD AUTO: 31.3 %
MCH RBC QN AUTO: 33.8 PG (ref 26.5–33)
MCHC RBC AUTO-ENTMCNC: 35.2 G/DL (ref 31.5–36.5)
MCV RBC AUTO: 96 FL (ref 78–100)
MONOCYTES # BLD AUTO: 0.5 10E9/L (ref 0–1.3)
MONOCYTES NFR BLD AUTO: 9.3 %
NEUTROPHILS # BLD AUTO: 3.3 10E9/L (ref 1.6–8.3)
NEUTROPHILS NFR BLD AUTO: 57.2 %
NRBC # BLD AUTO: 0 10*3/UL
NRBC BLD AUTO-RTO: 0 /100
PLATELET # BLD AUTO: 254 10E9/L (ref 150–450)
POTASSIUM SERPL-SCNC: 4.1 MMOL/L (ref 3.4–5.3)
PROT SERPL-MCNC: 7.7 G/DL (ref 6.8–8.8)
RBC # BLD AUTO: 4.11 10E12/L (ref 3.8–5.2)
SODIUM SERPL-SCNC: 142 MMOL/L (ref 133–144)
WBC # BLD AUTO: 5.8 10E9/L (ref 4–11)

## 2020-05-13 PROCEDURE — 85025 COMPLETE CBC W/AUTO DIFF WBC: CPT | Performed by: EMERGENCY MEDICINE

## 2020-05-13 PROCEDURE — 99284 EMERGENCY DEPT VISIT MOD MDM: CPT | Mod: 25 | Performed by: EMERGENCY MEDICINE

## 2020-05-13 PROCEDURE — 99284 EMERGENCY DEPT VISIT MOD MDM: CPT | Mod: Z6 | Performed by: EMERGENCY MEDICINE

## 2020-05-13 PROCEDURE — 96375 TX/PRO/DX INJ NEW DRUG ADDON: CPT | Performed by: EMERGENCY MEDICINE

## 2020-05-13 PROCEDURE — 25000128 H RX IP 250 OP 636: Performed by: EMERGENCY MEDICINE

## 2020-05-13 PROCEDURE — 80053 COMPREHEN METABOLIC PANEL: CPT | Performed by: EMERGENCY MEDICINE

## 2020-05-13 PROCEDURE — 96361 HYDRATE IV INFUSION ADD-ON: CPT | Performed by: EMERGENCY MEDICINE

## 2020-05-13 PROCEDURE — 96374 THER/PROPH/DIAG INJ IV PUSH: CPT | Performed by: EMERGENCY MEDICINE

## 2020-05-13 PROCEDURE — 25800030 ZZH RX IP 258 OP 636: Performed by: EMERGENCY MEDICINE

## 2020-05-13 RX ORDER — DIPHENHYDRAMINE HYDROCHLORIDE 50 MG/ML
25 INJECTION INTRAMUSCULAR; INTRAVENOUS ONCE
Status: COMPLETED | OUTPATIENT
Start: 2020-05-13 | End: 2020-05-13

## 2020-05-13 RX ORDER — ONDANSETRON 2 MG/ML
4 INJECTION INTRAMUSCULAR; INTRAVENOUS ONCE
Status: DISCONTINUED | OUTPATIENT
Start: 2020-05-13 | End: 2020-05-13 | Stop reason: HOSPADM

## 2020-05-13 RX ORDER — SODIUM CHLORIDE 9 MG/ML
1000 INJECTION, SOLUTION INTRAVENOUS CONTINUOUS
Status: DISCONTINUED | OUTPATIENT
Start: 2020-05-13 | End: 2020-05-13 | Stop reason: HOSPADM

## 2020-05-13 RX ORDER — ONDANSETRON 2 MG/ML
4 INJECTION INTRAMUSCULAR; INTRAVENOUS EVERY 30 MIN PRN
Status: DISCONTINUED | OUTPATIENT
Start: 2020-05-13 | End: 2020-05-13 | Stop reason: HOSPADM

## 2020-05-13 RX ADMIN — SODIUM CHLORIDE 1000 ML: 9 INJECTION, SOLUTION INTRAVENOUS at 15:59

## 2020-05-13 RX ADMIN — ONDANSETRON 4 MG: 2 INJECTION INTRAMUSCULAR; INTRAVENOUS at 15:59

## 2020-05-13 RX ADMIN — DIPHENHYDRAMINE HYDROCHLORIDE 25 MG: 50 INJECTION, SOLUTION INTRAMUSCULAR; INTRAVENOUS at 16:02

## 2020-05-13 ASSESSMENT — ENCOUNTER SYMPTOMS
NAUSEA: 1
FATIGUE: 1
ABDOMINAL PAIN: 1
SHORTNESS OF BREATH: 1
CHILLS: 1
VOMITING: 1
FEVER: 1
COUGH: 1

## 2020-05-13 ASSESSMENT — MIFFLIN-ST. JEOR: SCORE: 1530.08

## 2020-05-13 NOTE — TELEPHONE ENCOUNTER
"Spoke to patient, calling with new symptoms.  Headache all over her whole head, moderate 'pressure/tension'   - states it doesn't feel like a migraine, which she has hx of  Vomiting since this morning, 4 x since 7:30am  100.3 temp  Sore throat, can see redness in back of her throat  Body aches, especially shoulders/neck    Has taken 3 Tylenol and 3 ibuprofen together  - not effective    States she received writer's earlier mychart (see other encounters).  She is waiting Covid-19 results. Reassured her that whether she is or is not positive for this virus, treatment is the same - symptomatic care. She verbalizes understanding.    Marianna Layne, BSN, RN, PHN          Additional Information    Negative: Shock suspected (e.g., cold/pale/clammy skin, too weak to stand, low BP, rapid pulse)    Negative: Difficult to awaken or acting confused (e.g., disoriented, slurred speech)    Negative: Sounds like a life-threatening emergency to the triager    Negative: Vomiting occurs only while coughing    Negative: [1] Pregnant < 20 Weeks AND [2] nausea/vomiting began in early pregnancy (i.e., 4-8 weeks pregnant)    Negative: Chest pain    Negative: Headache is main symptom    Negative: Vomiting (or Nausea) in a cancer patient who is currently (or recently) receiving chemotherapy or radiation therapy, or cancer patient who has metastatic or end-stage cancer and is receiving palliative care    Negative: [1] Vomiting AND [2] contains red blood or black (\"coffee ground\") material  (Exception: few red streaks in vomit that only happened once)    Negative: Severe pain in one eye    Negative: Recent head injury (within last 3 days)    Negative: Recent abdominal injury (within last 3 days)    Negative: [1] Insulin-dependent diabetes (Type I) AND [2] glucose > 400 mg/dl (22 mmol/l)    Negative: [1] SEVERE vomiting (e.g., 6 or more times/day) AND [2] present > 8 hours    Negative: [1] MODERATE vomiting (e.g., 3 - 5 times/day) AND [2] " age > 60    Negative: Severe headache (e.g., excruciating) (Exception: similar to previous migraines)    Negative: High-risk adult (e.g., diabetes mellitus, brain tumor, V-P shunt, hernia)    Negative: [1] Drinking very little AND [2] dehydration suspected (e.g., no urine > 12 hours, very dry mouth, very lightheaded)    Negative: Patient sounds very sick or weak to the triager    Negative: [1] Vomiting AND [2] abdomen looks much more swollen than usual    Negative: [1] Vomiting AND [2] contains bile (green color)    Negative: [1] Constant abdominal pain AND [2] present > 2 hours    Negative: [1] Fever > 103 F (39.4 C) AND [2] not able to get the fever down using Fever Care Advice    Negative: [1] Fever > 101 F (38.3 C) AND [2] age > 60    Negative: [1] Fever > 100.0 F (37.8 C) AND [2] bedridden (e.g., nursing home patient, CVA, chronic illness, recovering from surgery)    Negative: [1] Fever > 100.0 F (37.8 C) AND [2] weak immune system (e.g., HIV positive, cancer chemo, splenectomy, organ transplant, chronic steroids)    Negative: Taking any of the following medications: digoxin (Lanoxin), lithium, theophylline, phenytoin (Dilantin)    Negative: [1] MILD or MODERATE vomiting AND [2] present > 48 hours (2 days) (Exception: mild vomiting with associated diarrhea)    Negative: Fever present > 3 days (72 hours)    Negative: Vomiting a prescription medication    Negative: [1] MILD vomiting with diarrhea AND [2] present > 5 days    Negative: Alcohol or drug abuse, known or suspected    Negative: Vomiting is a chronic symptom (recurrent or ongoing AND present > 4 weeks)    MILD or MODERATE vomiting (e.g., 1 - 5 times / day)    Protocols used: VOMITING-A-    Marianna Layne, EDYTAN, RN, PHN

## 2020-05-13 NOTE — DISCHARGE INSTRUCTIONS
You should contact your primary provider for a referral for pain management regarding your chronic abdominal pain.  They may have a better approach to help manage your symptoms    Even though your coronavirus test was negative when obtained at [facility], your symptoms are suggestive of novel coronavirus infection.    Instructions for Patients  Your symptoms show that you may have coronavirus (COVID-19). This illness can cause fever, cough and trouble breathing. Many people get a mild case and get better on their own. Some people can get very sick.     Not all patients are tested for COVID-19. If you need to be tested, your care team will let you know.    How can I protect others?    Without a test, we can t know for sure that you have COVID-19. For safety, it s very important to follow these rules.    Stay home and away from others (self-isolate) until:  You ve had no fever--and no medicine that reduces fever--for 3 full days (72 hours). And    Your other symptoms have resolved (gotten better). For example, your cough or breathing has improved. And   At least 10 days have passed since your symptoms started.    During this time:  Stay in your own room (and use your own bathroom), if you can.  Stay away from others in your home. No hugging, kissing or shaking hands.  No visitors.  Don t go to work, school or anywhere else.   Clean  high touch  surfaces often (doorknobs, counters, handles, etc.). Use a household cleaning spray or wipes.  Cover your mouth and nose with a mask, tissue or washcloth to avoid spreading germs.  Wash your hands and face often. Use soap and water.  For more tips, go to https://www.cdc.gov/coronavirus/2019-ncov/downloads/10Things.pdf.    How can I take care of myself?    Get lots of rest. Drink extra fluids (unless a doctor has told you not to).     Take Tylenol (acetaminophen) for fever or pain. If you have liver or kidney problems, ask your family doctor if it's okay to take  Tylenol.     Adults can take either:   650 mg (two 325 mg pills) every 4 to 6 hours, or   1,000 mg (two 500 mg pills) every 8 hours as needed.   Note: Don't take more than 3,000 mg in one day.   Acetaminophen is found in many medicines (both prescribed and over-the-counter medicines). Read all labels to be sure you don't take too much.   For children, check the Tylenol bottle for the right dose. The dose is based on  the child's age or weight.    If you have other health problems (like cancer, heart failure, an organ transplant or severe kidney disease): Call your specialty clinic if you don't feel better in the next 2 days.    Know when to call 911: If your breathing is so bad that it keeps you from doing normal activities, call 911 or go to the emergency room. Tell them that you've been staying home and may have COVID-19.    Your symptoms show that you may have coronavirus (COVID-19). This illness can cause fever, cough and trouble breathing. Many people get a mild case and get better on their own. Some people can get very sick.     We d like you to contact our online care team. Please follow these instructions:  1. Go to the website [email or web address].  2. Create an account. (If you have health insurance, please have your insurance card ready.)   3. Start a new visit.  4. Choose a reason for your visit (such as COVID-19).  5. Answer questions about your symptoms.  6. Our care team will contact you about what to do next.    How can I protect others from COVID-19?     Stay home and away from others (self-isolate) until:  At least 10 days have passed since your symptoms started. And   You ve had no fever--and no medicine that reduces fever--for 3 full days (72 hours). And    Your other symptoms have resolved (gotten better).     During this time:  Stay in your own room (and use your own bathroom), if you can.  Stay away from others in your home. No hugging, kissing or shaking hands.  No visitors.  Don t go to work, school  or anywhere else.   Clean  high touch  surfaces often (doorknobs, counters, handles, etc.). Use a household cleaning spray or wipes.  Cover your mouth and nose with a mask, tissue or wash cloth to avoid spreading germs.  Wash your hands and face often. Use soap and water.  For more tips, go to https://www.cdc.gov/coronavirus/2019-ncov/downloads/10Things.pdf.      How can I take care of myself?    Get lots of rest. Drink extra fluids (unless a doctor has told you not to).     Take Tylenol (acetaminophen) for fever or pain. If you have liver or kidney problems, ask your family doctor if it's okay to take Tylenol.     Adults can take either:   650 mg (two 325 mg pills) every 4 to 6 hours, or   1,000 mg (two 500 mg pills) every 8 hours as needed.   Note: Don't take more than 3,000 mg in one day.   Acetaminophen is found in many medicines (both prescribed and over-the-counter medicines). Read all labels to be sure you don't take too much.   For children, check the Tylenol bottle for the right dose. The dose is based on the child's age or weight.    If you have other health problems (like cancer, heart failure, an organ transplant or severe kidney disease): Call your specialty clinic if you don't feel better in the next 2 days.    Know when to call 911: If your breathing is so bad that it keeps you from doing normal activities, call 911 or go to the emergency room. Tell them that you've been staying home and may have COVID-19.    Thank you for taking steps to prevent the spread of this virus.  Limit your contact with others.  Wear a simple mask to cover your cough.  Wash your hands well and often.  If you need medical care, go to [email or web address] or contact your health care provider.     For more about COVID-19 and caring for yourself at home, visit the CDC website at https://www.cdc.gov/coronavirus/2019-ncov/about/steps-when-sick.html.     To learn about care at [facility], please go to  [email or web address].     Below are the COVID-19 hotlines at the [facility] ([facility]). Interpreters are available.   For health questions: Call 094-724-4275 or 1-413.627.6773 (7 a.m. to 7 p.m.)  For questions about schools and childcare: Call 563-025-9622 or 1-197.253.2095 (7 a.m. to 7 p.m.)

## 2020-05-13 NOTE — TELEPHONE ENCOUNTER
Patient returned call. She was given message below and did not address the part of the message about doing a wet prep and e-visit after being asked if she would like to schedule the wet prep lab portion so she can do an evisit. Patient reported new symptoms : skin around vagina is sticky/wet feeling and the skin seems like it is peeling. She said she will try finishing her yeast medication. Please advise.

## 2020-05-13 NOTE — ED NOTES
Pt refused zofran admin before leaving, 'just wanted to go', IV removed and discharge instructions reviewed. No questions or concerns. Pt will take otc meds at home for symptomatic tx. Was thankful for care provided expressing gratitude.  used to review discharge instruction and education.

## 2020-05-13 NOTE — TELEPHONE ENCOUNTER
Per 5/12/20 triage encounter, patient spoke to rectal surgeon's triage nurse, reassured that yellow string was a dissolvable suture, no treatment required.    Started fluconazole for vaginal yeast 5/7/20, given 3 doses.     Replied to patient via mychart (see other encounter).    Marianna Layne, BSN, RN, PHN

## 2020-05-13 NOTE — TELEPHONE ENCOUNTER
Covid-19 test result is not yet available per [facility] Everywhere. [facility] [place] [facility] should be contacting patient once it is available.    Last Depo injection was scheduled 4/23/20.  NEXT INJECTION DUE: 7/9/20 - 7/23/20    Responded to patient via [employer].    Marianna Layne, BSN, RN, PHN

## 2020-05-13 NOTE — ED TRIAGE NOTES
Left ear pain, sore throat, coughing, abd pain, vomiting not able to tolerate food or fluids. Today temp 100.3, took tylenol and iburprofen. covid swab yesterday at [place], no known result.

## 2020-05-13 NOTE — ED NOTES
Pt continues to have abd discomfort, nausea improved. Provided warm blanket, offered cold/hot pack and refused. Will let IV meds continue to work and reassess pain. Call light in place. Provided clipboard with paper and pen to ask questions and communicate. Pt in agreement.

## 2020-05-13 NOTE — ED PROVIDER NOTES
History     Chief Complaint   Patient presents with     Cough     Fever     HPI  Katy Islas is a 30 year old female who presents to the ER with abdominal pain, cough, fever, sore throat.  Patient is well-known to the ER, has had multiple visits for chronic abdominal pain and has had extensive work-up.  She returns today because she continues to have abdominal pain, also has cough, fever, headache, and sore throat.  She was evaluated at [facility] on 5/11/2020, and was tested for novel coronavirus at that time.  She was discharged with instructions on symptomatic treatment at home.  When she directed her primary provider today regarding the symptoms, she was told that regardless of a positive or negative results the only treatment currently is symptomatic treatment and to continue what had been recommended previously.  He comes to the ER now stating that her primary provider's office informed her to come to the ER if she is having pain.  She has taken Tylenol and ibuprofen at home with no relief.  Also had some vomiting, was nonbloody.    Allergies:  Allergies   Allergen Reactions     Iohexol Hives     Other reaction(s): Hives  IV contrast; patient states she tolerates contrast if she gets benadryl before  IV contrast; patient states she tolerates contrast if she gets benadryl before     Ativan [Lorazepam] Hives     At the IV site     Baclofen      hives     Bees Hives, Swelling and Difficulty breathing     Caffeine      Contrast Dye      Hives,   Updated 5/10/2016 CT Contrast.     Dilaudid [Hydromorphone] Itching     Iodine Hives     Methocarbamol Swelling     Metoclopramide      Other reaction(s): Tremors  LW Reaction: shaking/sweating     Midazolam      Other reaction(s): Agitation     Monosodium Glutamate      Morphine Other (See Comments)     Difficulty with urination     Nsaids Other (See Comments)     GI BLEED x2     Other (Do Not Use) Other (See Comments)     Xanaflex- pt becomes disoriented  and loses bladder control     Reglan [Metoclopramide Hcl] Other (See Comments)     shaking     Soma [Carisoprodol] Visual Disturbance     Sleep walking     Tizanidine      Topamax Other (See Comments)     Topamax [Topiramate] Nausea     Tingling  GI/Vomit     Tramadol      Severe Headache, Seizure Risk     Tylenol W/Codeine [Acetaminophen-Codeine] Nausea and Itching     Tylenol 3     Versed Other (See Comments)     Zolmitriptan      Makes face feel like its twitching     Droperidol Anxiety     Flu Virus Vaccine Rash      Arm swelling       Problem List:    Patient Active Problem List    Diagnosis Date Noted     Abscess of anal and rectal regions 04/27/2020     Priority: Medium     Added automatically from request for surgery [id number]       Rectal pain 04/19/2020     Priority: Medium     Abdominal pain 04/18/2020     Priority: Medium     Morbid obesity (H) 03/10/2020     Priority: Medium     Abnormal uterine bleeding 02/12/2020     Priority: Medium     Added automatically from request for surgery [id number]       Anal fissure 02/04/2020     Priority: Medium     Low hematocrit 02/04/2020     Priority: Medium     Elevated d-dimer 02/04/2020     Priority: Medium     Hypertension goal BP (blood pressure) < 130/80 08/05/2019     Priority: Medium     Bulge of cervical disc without myelopathy 04/24/2019     Priority: Medium     Cervicalgia 04/17/2019     Priority: Medium     Class 1 obesity due to excess calories without serious comorbidity with body mass index (BMI) of 32.0 to 32.9 in adult 04/03/2019     Priority: Medium     Hypophosphatemia 11/01/2018     Priority: Medium     Patellar maltracking, right 09/05/2018     Priority: Medium     Right anterior knee pain 09/05/2018     Priority: Medium     Melanocytic nevus, unspecified location 07/23/2018     Priority: Medium     Dysplastic skin lesion 07/23/2018     Priority: Medium     Iliotibial band syndrome affecting lower leg, right 12/21/2017     Priority: Medium      Chondromalacia of right patellofemoral joint 12/21/2017     Priority: Medium     Upper GI bleed - suspected 07/01/2017     Priority: Medium     Tobacco use disorder 07/01/2017     Priority: Medium     History of pseudoseizure 07/01/2017     Priority: Medium     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) 03/10/2017     Priority: Medium     Lumbar disc disease with radiculopathy 02/23/2017     Priority: Medium     S/P lumbar fusion 02/23/2017     Priority: Medium     Dr. YOVANI Millan, 2/23/17       Vitamin D deficiency 01/06/2017     Priority: Medium     Atypical mole 01/06/2017     Priority: Medium     Mild persistent asthma without complication 12/02/2016     Priority: Medium     Chronic bilateral low back pain with left-sided sciatica 12/02/2016     Priority: Medium     Bee sting allergy 09/16/2016     Priority: Medium     Gastroesophageal reflux disease without esophagitis 08/26/2016     Priority: Medium     Herpes simplex virus infection 11/12/2015     Priority: Medium     Adjustment disorder with mixed anxiety and depressed mood 10/14/2015     Priority: Medium     Marijuana abuse 02/22/2015     Priority: Medium     Bipolar disorder (H) 01/31/2013     Priority: Medium     Problem list name updated by automated process. Provider to review       Chronic pain 02/09/2012     Priority: Medium     Patient is followed by Aura Rosario PA-C for ongoing prescription of pain medication.  All refills should only be approved by this provider, or covering partner.    Medication(s): Norco    Maximum quantity per month: 70  Clinic visit frequency required: Q 2 months     Controlled substance agreement:   Discussed and signed 4/25/17    Encounter-Level CSA - 01/12/2015:                 Controlled Substance Agreement - Scan on 1/26/2015  9:14 AM : Controlled Medication Agreement-01/12/15 (below)            Pain Clinic evaluation in the past: Yes       Date/Location:   [facility], fall 2016    DIRE Total  Score(s):    4/25/2017   Total Score 17       Last [facility] website verification:  done on 4/25/17 by LOVE Maki   [email or web address]           Anxiety 09/22/2011     Priority: Medium     Mild major depression (H) 08/29/2011     Priority: Medium     Mild intermittent asthma 10/12/2009     Priority: Medium     Overview:   Formatting of this note might be different from the original.  Action plan: See letter 4/10/2013   ATAQ:   Asthma Data 4/10/2013   Date completed 4/10/2013   Missed daily activities no = 0   Wake at night no = 0   Believe asthma in control yes = 0   ARIN use yes   Maximum ARIN use in 1 day 1-4 = 0   ATAQ score 0 = well controlled   Asthma ED visits in past 12 mos 0   Asthma hospitalizations in past 12 mos 0     Current Outpatient Prescriptions   Medication Sig     albuterol (PROVENTIL) 0.083 % neb solution Inhale 3 mL via a nebulizer every 4 hours if needed for Shortness Of Breath.        Crohn's colitis (H) 08/04/2009     Priority: Medium     Dysmenorrhea 05/11/2007     Priority: Medium     Benign neoplasm of skin of lower limb, including hip 03/16/2004     Priority: Medium     Migraine      Priority: Medium     Dr. Farrar - Neurology.  Now on Inderal.  Seems to be working.  Follow-up 9/08  Problem list name updated by automated process. Provider to review       Hearing loss      Priority: Medium     Congenital  Problem list name updated by automated process. Provider to review       Allergic rhinitis      Priority: Medium     Problem list name updated by automated process. Provider to review          Past Medical History:    Past Medical History:   Diagnosis Date     Abdominal pain 10/31- 11/4/2005     Allergic rhinitis, cause unspecified      Arthritis      C. 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, without mention of intractable epilepsy      Migraine 07/21/12     Migraine, unspecified, without mention of intractable migraine without mention of status migrainosus      Mild intermittent asthma      Mycoplasma infection in conditions classified elsewhere and of unspecified site      Other chronic pain      Renal disease      Unspecified hearing loss        Past Surgical History:    Past Surgical History:   Procedure Laterality Date     AS REMOVAL OF COCCYX  10/18/2017     C FUSION OF SACROILIAC JOINT  10/26/2018     COLONOSCOPY  7/1/2009    [facility].     COLONOSCOPY N/A 7/17/2019    Procedure: Colonoscopy, With Polypectomy And Biopsy;  Surgeon: Edwin Conde MD;  Location: [facility] OR     COLONOSCOPY WITH CO2 INSUFFLATION N/A 7/17/2019    Procedure: COLONOSCOPY, WITH CO2 INSUFFLATION;  Surgeon: Edwin Conde MD;  Location: [facility] OR     COMBINED ESOPHAGOSCOPY, GASTROSCOPY, DUODENOSCOPY (EGD) WITH CO2 INSUFFLATION N/A 4/12/2019    Procedure: COMBINED ESOPHAGOSCOPY, GASTROSCOPY, DUODENOSCOPY (EGD) WITH CO2 INSUFFLATION;  Surgeon: Edwin Conde MD;  Location: [facility] OR     ESOPHAGOSCOPY, GASTROSCOPY, DUODENOSCOPY (EGD), COMBINED N/A 4/12/2019    Procedure: Combined Esophagoscopy, Gastroscopy, Duodenoscopy (Egd), Biopsy Single Or Multiple;  Surgeon: Edwin Conde MD;  Location: [facility] OR     FISTULOTOMY RECTUM N/A 4/30/2020    Procedure: EXAM UNDER ANESTHESIA, ANUS, parital fistulotomy;  Surgeon: Valentin Sanchez MD;  Location: [facility] OR     FUSION SPINE POSTERIOR MINIMALLY INVASIVE ONE LEVEL N/A 2/23/2017    Procedure: FUSION SPINE POSTERIOR MINIMALLY INVASIVE ONE LEVEL;  L4-5 Oblique Lateral Lumbar Interbody Fusion   Epidural steroid injection.   Transpedicular Bone marrow aspiration;  Surgeon: Jeniffer Eugene MD;   Location: [facility] OR     HC COLONOSCOPY THRU STOMA WITH BIOPSY  10/29/2003    Impression is that of normal appearing colonoscopy, without evidence of rectal bleeding.     HC COLONOSCOPY THRU STOMA, DIAGNOSTIC  10/00    normal     HC COLONOSCOPY THRU STOMA, DIAGNOSTIC  Oct 2009    Dr López- normal     HC EEG AWAKE AND SLEEP      abnormal     HC MRI BRAIN W/O CONTRAST  12/00    normal     HC REMOVAL GALLBLADDER  8/5/2009    [facility]     HC UGI ENDOSCOPY DIAG W BIOPSY  11/11/09    Normal esophagus     HC UGI ENDOSCOPY, SIMPLE EXAM  7/00, 10/00    mild chronic esophagitis and duodenitis, neg H pylori     HC UGI ENDOSCOPY, SIMPLE EXAM  01/20/2005    Esophagogastroduodenoscopy, colonoscopy with biopsies.  [facility]     HC UGI ENDOSCOPY, SIMPLE EXAM  7/1/2009    [facility].      UGI ENDOSCOPY, SIMPLE EXAM  11/11/2009    attempted upper GI, pt. could not tolerate procedure:[facility]     ORTHOPEDIC SURGERY  October 19,2011    diskectomy L4-L5       Family History:    Family History   Problem Relation Age of Onset     Gastrointestinal Disease Brother         severe Crohn's     Neurologic Disorder Brother         Seizures post head injury     Depression Brother      Substance Abuse Brother      Genitourinary Problems Father         kidney stones     Diabetes Father      Heart Disease Father         Open heart surgery     Breast Cancer Maternal Grandmother      Parkinsonism Maternal Grandmother      Cerebrovascular Disease Paternal Grandmother      Cancer Maternal Grandfather         Lung     Cardiovascular Paternal Grandfather         Heart Attack     Substance Abuse Mother         in recovery x1 year      Lung Cancer Paternal Uncle      Cancer Paternal Uncle      Lung Cancer Maternal Uncle        Social History:  Marital Status:  Single [1]  Social History     Tobacco Use     Smoking status: Current Every Day Smoker     Packs/day: 0.25     Years: 5.00      Pack years: 1.25     Types: Cigarettes     Smokeless tobacco: Never Used   Substance Use Topics     Alcohol use: Not Currently     Comment: Not since last July 2018     Drug use: Yes     Types: Marijuana     Comment: Medical Marijuana currently-- More CBD        Medications:    acetaminophen (TYLENOL) 500 MG tablet  albuterol (PROAIR HFA/PROVENTIL HFA/VENTOLIN HFA) 108 (90 Base) MCG/ACT inhaler  albuterol (PROVENTIL) (2.5 MG/3ML) 0.083% neb solution  alum & mag hydroxide-simethicone (MAALOX ADVANCED MAX ST) 400-400-40 MG/5ML SUSP suspension  ARIPiprazole (ABILIFY) 15 MG tablet  aspirin (ASA) 81 MG tablet  azelastine (OPTIVAR) 0.05 % ophthalmic solution  buPROPion (WELLBUTRIN XL) 150 MG 24 hr tablet  busPIRone (BUSPAR) 15 MG tablet  cephALEXin (KEFLEX) 500 MG capsule  clonazePAM (KLONOPIN) 1 MG tablet  cyclobenzaprine (FLEXERIL) 10 MG tablet  diphenhydrAMINE (BENADRYL) 50 MG capsule  DULoxetine (CYMBALTA) 60 MG EC capsule  EPINEPHrine (ANY BX GENERIC EQUIV) 0.3 MG/0.3ML injection 2-pack  fexofenadine (ALLEGRA) 180 MG tablet  gabapentin (NEURONTIN) 300 MG capsule  HYDROmorphone (DILAUDID) 2 MG tablet  lidocaine (XYLOCAINE) 2 % solution  losartan (COZAAR) 100 MG tablet  medical cannabis (Patient's own supply)  medroxyPROGESTERone (DEPO-PROVERA) 150 MG/ML IM injection  methocarbamol (ROBAXIN) 750 MG tablet  metoprolol succinate ER (TOPROL-XL) 200 MG 24 hr tablet  NONFORMULARY  ofloxacin (FLOXIN) 0.3 % otic solution  ondansetron (ZOFRAN-ODT) 8 MG ODT tab  oxyCODONE-acetaminophen (PERCOCET) 5-325 MG tablet  pantoprazole (PROTONIX) 40 MG EC tablet  polyethylene glycol (MIRALAX) 17 g packet  prazosin (MINIPRESS) 1 MG capsule  prochlorperazine (COMPAZINE) 10 MG tablet  rizatriptan (MAXALT-MLT) 5 MG ODT  sucralfate (CARAFATE) 1 GM tablet  valACYclovir (VALTREX) 1000 mg tablet          Review of Systems   Constitutional: Positive for chills, fatigue and fever.   Respiratory: Positive for cough and shortness of breath.   "  Gastrointestinal: Positive for abdominal pain, nausea and vomiting.   All other systems reviewed and are negative.      Physical Exam   BP: (!) 123/91  Heart Rate: 129  Temp: 99.2  F (37.3  C)  Resp: 18  Height: 157.5 cm (5' 2\")  Weight: 86.2 kg (190 lb)  SpO2: 96 %      Physical Exam  Constitutional:       General: She is not in acute distress.     Appearance: She is obese.   HENT:      Head: Normocephalic and atraumatic.      Nose: Nose normal.      Mouth/Throat:      Mouth: Mucous membranes are moist.   Cardiovascular:      Rate and Rhythm: Normal rate and regular rhythm.   Pulmonary:      Effort: Pulmonary effort is normal.      Breath sounds: Normal breath sounds.   Abdominal:      Palpations: Abdomen is soft.      Tenderness: There is generalized abdominal tenderness. There is no guarding or rebound.   Skin:     General: Skin is warm and dry.   Neurological:      Mental Status: She is alert.         ED Course        Procedures               Critical Care time:  none               Results for orders placed or performed during the hospital encounter of 05/13/20 (from the past 24 hour(s))   CBC with platelets differential   Result Value Ref Range    WBC 5.8 4.0 - 11.0 10e9/L    RBC Count 4.11 3.8 - 5.2 10e12/L    Hemoglobin 13.9 11.7 - 15.7 g/dL    Hematocrit 39.5 35.0 - 47.0 %    MCV 96 78 - 100 fl    MCH 33.8 (H) 26.5 - 33.0 pg    MCHC 35.2 31.5 - 36.5 g/dL    RDW 11.9 10.0 - 15.0 %    Platelet Count 254 150 - 450 10e9/L    Diff Method Automated Method     % Neutrophils 57.2 %    % Lymphocytes 31.3 %    % Monocytes 9.3 %    % Eosinophils 1.2 %    % Basophils 0.7 %    % Immature Granulocytes 0.3 %    Nucleated RBCs 0 0 /100    Absolute Neutrophil 3.3 1.6 - 8.3 10e9/L    Absolute Lymphocytes 1.8 0.8 - 5.3 10e9/L    Absolute Monocytes 0.5 0.0 - 1.3 10e9/L    Absolute Basophils 0.0 0.0 - 0.2 10e9/L    Abs Immature Granulocytes 0.0 0 - 0.4 10e9/L    Absolute Nucleated RBC 0.0    Comprehensive metabolic panel "   Result Value Ref Range    Sodium 142 133 - 144 mmol/L    Potassium 4.1 3.4 - 5.3 mmol/L    Chloride 110 (H) 94 - 109 mmol/L    Carbon Dioxide 25 20 - 32 mmol/L    Anion Gap 7 3 - 14 mmol/L    Glucose 95 70 - 99 mg/dL    Urea Nitrogen 12 7 - 30 mg/dL    Creatinine 0.74 0.52 - 1.04 mg/dL    GFR Estimate >90 >60 mL/min/[1.73_m2]    GFR Estimate If Black >90 >60 mL/min/[1.73_m2]    Calcium 8.5 8.5 - 10.1 mg/dL    Bilirubin Total 0.4 0.2 - 1.3 mg/dL    Albumin 3.7 3.4 - 5.0 g/dL    Protein Total 7.7 6.8 - 8.8 g/dL    Alkaline Phosphatase 87 40 - 150 U/L    ALT 36 0 - 50 U/L    AST 21 0 - 45 U/L     *Note: Due to a large number of results and/or encounters for the requested time period, some results have not been displayed. A complete set of results can be found in Results Review.       Medications   0.9% sodium chloride BOLUS (0 mLs Intravenous Stopped 5/13/20 1723)   diphenhydrAMINE (BENADRYL) injection 25 mg (25 mg Intravenous Given 5/13/20 1602)       Assessments & Plan (with Medical Decision Making)  Katy is a 30-year-old female with chronic abdominal pain who presents to the ER with continued abdominal pain and fever, cough, headache.  She was recently seen for similar complaint at [facility] and was tested for novel coronavirus at that time.  See history and physical exam as above, and see previous notes for complete details of previous work-up of GI complaints.  Peripheral IV was started to give IV fluids, Benadryl, and Zofran.  CBC and CMP were obtained, within normal limits.  Note coronavirus test obtained from [facility], at this time results of PCR testing are negative.  Her strep test at that visit was also negative.  Form the patient of these results, and states that since she has had repeated work-up for this chronic abdominal pain, I do not believe that she needs advanced imaging at this time.  He understands and agrees.  Is requesting another dose of Zofran and pain medication  before she leaves, she is requesting Dilaudid IV.  See that this patient has had issues with drug-seeking behavior in the past.  I state that I am okay with giving her another dose of Zofran, but there is no indication to give her IV Dilaudid at this time.  I did suggest she follow closely with her primary provider and may need a referral to pain management for her chronic abdominal pain.  They can plan a multidisciplinary approach to her chronic pain to better treat the symptoms.  She agrees and told nursing staff that she did not want the dose of Zofran since she was not getting any pain medication and just wished to leave.  Discharged in the ED in stable condition, no acute distress.     I have reviewed the nursing notes.    I have reviewed the findings, diagnosis, plan and need for follow up with the patient.       Discharge Medication List as of 5/13/2020  5:23 PM          Final diagnoses:   Clinical diagnosis of COVID-19   Chronic abdominal pain       5/13/2020   [facility] EMERGENCY DEPARTMENT     Sweta Tong DO  05/13/20 8907

## 2020-05-13 NOTE — ED AVS SNAPSHOT
[facility] Emergency Department  [address] [place] [address]  Phone:  [phone]  Fax:  [phone]                                    Katy Islas   MRN: [id number]    Department:  [facility] Emergency Department   Date of Visit:  5/13/2020           After Visit Summary Signature Page    I have received my discharge instructions, and my questions have been answered. I have discussed any challenges I see with this plan with the nurse or doctor.    ..........................................................................................................................................  Patient/Patient Representative Signature      ..........................................................................................................................................  Patient Representative Print Name and Relationship to Patient    ..................................................               ................................................  Date                                   Time    ..........................................................................................................................................  Reviewed by Signature/Title    ...................................................              ..............................................  Date                                               Time          22EPIC Rev 08/18

## 2020-05-13 NOTE — TELEPHONE ENCOUNTER
I spoke with the pt (sign language interpretor) Pulse 140 134/87. Pt states she is not able to keep any solids down. Has been trying to sip water every 5 minutes and still is vomiting. Terrible headache, some blood in her vomit now. Not dizzy. Pt will go to ED    Claudine Jimenez, MSN, RN

## 2020-05-13 NOTE — TELEPHONE ENCOUNTER
Reason for Call:  Other call back    Detailed comments: Patient would like to know when she is due for her next Depo shot. She was also seen in the [facility] emergency room for a headache and a cough. She was tested for COVID 19 and wants to know if we have the results for that yet    Phone Number Patient can be reached at: Home number on file [phone] (home)    Best Time: any    Can we leave a detailed message on this number? YES    Call taken on 5/13/2020 at 9:50 AM by Souleymane Garcia

## 2020-05-14 ENCOUNTER — TELEPHONE (OUTPATIENT)
Dept: FAMILY MEDICINE | Facility: OTHER | Age: 30
End: 2020-05-14

## 2020-05-14 ENCOUNTER — PATIENT OUTREACH (OUTPATIENT)
Dept: CARE COORDINATION | Facility: CLINIC | Age: 30
End: 2020-05-14

## 2020-05-14 DIAGNOSIS — Z20.828 EXPOSURE TO SARS-ASSOCIATED CORONAVIRUS: Primary | ICD-10-CM

## 2020-05-14 ASSESSMENT — ACTIVITIES OF DAILY LIVING (ADL): DEPENDENT_IADLS:: INDEPENDENT

## 2020-05-14 NOTE — TELEPHONE ENCOUNTER
I discussed with her at length and encouraged her to make the change. She was going to think about it.     Zach Rosario PA-C  [facility]

## 2020-05-14 NOTE — TELEPHONE ENCOUNTER
Patient is having a lot of vaginal pain, she would like to come in a see a female provider for a face to face visit.  She said this is ongoing and ans she would really like to be seen.

## 2020-05-14 NOTE — TELEPHONE ENCOUNTER
Patient was referred to the [facility], chart was reviewed, medically complex, chronic pain, appears to be opioid seeking and dependent, also using benzo's, MH not well controlled although she states she is seeing Psych and counseling but no notes in Epic since 2013, bipolar, depression,anxiety, PTSD, currently on meds, multiple my chart messages, past hx of anger and aggression when not able to get controlled substances, Although the patient qualifies for our program it does not appear that she is willing to transfer her primary care to [facility].  I will have her current primary care clinic try to confirm that the patient is willing to schedule with us and transfer her primary care before we reach out to her to schedule.    Anaya Ibarra P  MEDICAL LEAD IPC

## 2020-05-14 NOTE — LETTER
[place] CARE COORDINATION  [facility] - [address] [place] [address]  [phone]    May 14, 2020    Katy Islas  [address]  [facility] [address]      Dear Katy,    I am a clinic care coordinator who works with Aura Rosario PA-C at [facility]. I wanted to introduce myself and provide you with my contact information for you to be able to contact me with any questions or concerns. Below is a description of clinic care coordination and how I can further assist you.      The clinic care coordination team is made up of a registered nurse,  and community health worker who understand the health care system. The goal of clinic care coordination is to help you manage your health and improve access to the health care system in the most efficient manner. The team can assist you in meeting your health care goals by providing education, coordinating services, strengthening the communication among your providers and supporting you with any resource needs.    Please feel free to contact me at [phone] with any questions or concerns. We are focused on providing you with the highest-quality healthcare experience possible and that all starts with you.     I noticed you have been in the ED with a COVID 19 diagnosis. I wanted to follow up with you to see how you are doing.     Sincerely,     Frankie Jaimes RN  Clinic Care Coordinator                Your symptoms show that you may have coronavirus (COVID-19). This illness can cause fever, cough and trouble breathing. Many people get a mild case and get better on their own. Some people can get very sick.     Not all patients are tested for COVID-19. If you need to be tested, your care team will let you know.     How can I protect others?    Without a test, we can't know for sure that you have COVID-19. For safety, it's very important to follow these rules.    Stay home and away from others  (self-isolate) until:    At least 10 days have passed since your symptoms started. And     You've had no fever--and no medicine that reduces fever--for 3 full days (72 hours). And      Your other symptoms have resolved (gotten better).     During this time:    Stay in your own room (and use your own bathroom), if you can.    Stay away from others in your home. No hugging, kissing or shaking hands.    No visitors.    Don't go to work, school or anywhere else.     Clean  high touch  surfaces often (doorknobs, counters, handles, etc.). Use a household cleaning spray or wipes.    Cover your mouth and nose with a mask, tissue or wash cloth to avoid spreading germs.    Wash your hands and face often. Use soap and water.    For more tips, go to https://www.cdc.gov/coronavirus/2019-ncov/downloads/10Things.pdf.    How can I take care of myself?    1. Get lots of rest. Drink extra fluids (unless a doctor has told you not to).     2. Take Tylenol (acetaminophen) for fever or pain. If you have liver or kidney problems, ask your family doctor if it's okay to take Tylenol.     Adults can take either:     650 mg (two 325 mg pills) every 4 to 6 hours, or     1,000 mg (two 500 mg pills) every 8 hours as needed.     Note: Don't take more than 3,000 mg in one day.   Acetaminophen is found in many medicines (both prescribed and over-the-counter medicines). Read all labels to be sure you don't take too much.   For children, check the Tylenol bottle for the right dose. The dose is based on the child's age or weight.    3. If you have other health problems (like cancer, heart failure, an organ transplant or severe kidney disease): Call your specialty clinic if you don't feel better in the next 2 days.    4. Know when to call 911: If your breathing is so bad that it keeps you from doing normal activities, call 911 or go to the emergency room. Tell them that you've been staying home and may have COVID-19.      What are the symptoms of  COVID-19?     The most common symptoms are cough, fever and trouble breathing.     Less common symptoms include body aches, chills, diarrhea (loose, watery poops), fatigue (feeling very tired), headache, runny nose, sore throat and loss of smell.     COVID-19 can cause severe coughing (bronchitis) and lung infection (pneumonia).    How does it spread?     The virus may spread when a person coughs or sneezes into the air. The virus can travel about 6 feet this way, and it can live on surfaces.      Common  (household disinfectants) will kill the virus.    Who is at risk?  Anyone can catch COVID-19 if they're around someone who has the virus.    How can others protect themselves?     Stay away from people who have COVID-19 (or symptoms of COVID-19).    Wash hands often with soap and water. Or, use hand  with at least 60% alcohol.    Avoid touching the eyes, nose or mouth.     Wear a face mask when you go out in public, when sick or when caring for a sick person.      For more about COVID-19 and caring for yourself at home, please visit the CDC website at https://www.cdc.gov/coronavirus/2019-ncov/about/steps-when-sick.html.     To learn about care at [facility], go to [email or web address].    Below are the COVID-19 hotlines at the [place] [facility] ([facility]). Interpreters are available.     For health questions: Call 225-549-5325 or 1-665.731.7274 (7 a.m. to 7 p.m.)    For questions about schools and childcare: Call 817-615-8816 or 1-416.365.9718 (7 a.m. to 7 p.m.)

## 2020-05-14 NOTE — PROGRESS NOTES
Clinic Care Coordination Contact  Care Team Conversations    Patient was in ED for COVID 19. Patient is none verbal so was contacted via [employer]. Resources sent for COVID 19.     Plan: 1) Care Coordinator will continue to be available for contact.     Frankie Jaimes RN  Primary Care Clinic RN Care Coordinator  [facility]   [phone]

## 2020-05-14 NOTE — TELEPHONE ENCOUNTER
Left detailed message for patient informing her that Zach previously recommended an E-visit for her vaginal pain and she should proceed with that.     For staff -   Her recent Covid test was negative, but she has had fevers.     Ines Roy, RN, BSN

## 2020-05-18 ENCOUNTER — NURSE TRIAGE (OUTPATIENT)
Dept: FAMILY MEDICINE | Facility: OTHER | Age: 30
End: 2020-05-18

## 2020-05-18 ENCOUNTER — TRANSFERRED RECORDS (OUTPATIENT)
Dept: HEALTH INFORMATION MANAGEMENT | Facility: CLINIC | Age: 30
End: 2020-05-18

## 2020-05-18 ENCOUNTER — TELEPHONE (OUTPATIENT)
Dept: FAMILY MEDICINE | Facility: OTHER | Age: 30
End: 2020-05-18

## 2020-05-18 ENCOUNTER — TELEPHONE (OUTPATIENT)
Dept: SURGERY | Facility: CLINIC | Age: 30
End: 2020-05-18

## 2020-05-18 ENCOUNTER — OFFICE VISIT (OUTPATIENT)
Dept: FAMILY MEDICINE | Facility: OTHER | Age: 30
End: 2020-05-18
Payer: MEDICARE

## 2020-05-18 DIAGNOSIS — H92.09 OTALGIA, UNSPECIFIED LATERALITY: Primary | ICD-10-CM

## 2020-05-18 PROBLEM — H93.92 PROBLEM OF LEFT EAR: Status: RESOLVED | Noted: 2020-05-18 | Resolved: 2020-05-18

## 2020-05-18 PROBLEM — H93.92 PROBLEM OF LEFT EAR: Status: ACTIVE | Noted: 2020-05-18

## 2020-05-18 LAB
ALT SERPL-CCNC: 24 U/L (ref 8–45)
AST SERPL-CCNC: 16 U/L (ref 5–41)
CREAT SERPL-MCNC: 0.92 MG/DL (ref 0.57–1.11)
GFR SERPL CREATININE-BSD FRML MDRD: >60 ML/MIN/1.73M2
GLUCOSE SERPL-MCNC: 88 MG/DL (ref 70–105)
POTASSIUM SERPL-SCNC: 4.1 MMOL/L (ref 3.5–5.1)

## 2020-05-18 PROCEDURE — 99207 ZZC NO BILLABLE SERVICE THIS VISIT: CPT | Performed by: NURSE PRACTITIONER

## 2020-05-18 NOTE — TELEPHONE ENCOUNTER
Will route to EM as she did a phone visit.      KV recommends to follow up with surgeon regarding issues with her sutures.    Iggy Bernstein, RN, BSN

## 2020-05-18 NOTE — TELEPHONE ENCOUNTER
My understanding was LM was going to contact patient.         NATA Retana CNP  Questions or concerns please feel free to send me a [employer] message or call me  Phone : [phone]

## 2020-05-18 NOTE — TELEPHONE ENCOUNTER
Reason for Call:  Other call back    Detailed comments: Ear pain,   She would like to know in the notes what the follow up would be for the ear pain     Phone Number Patient can be reached at: Home number on file [phone] (home)    Best Time: anytime    Can we leave a detailed message on this number? YES    Call taken on 5/18/2020 at 3:33 PM by Nikhil Barton

## 2020-05-18 NOTE — TELEPHONE ENCOUNTER
Spoke with patient with  services.     BP is elevated 174/96 pulse 124. Left side of butt hurts 8/10.  Thinks that she may have pulled a stitch. Too painful to sit down.     states that the patient appears to be in a lot pain.   Has diarrhea currently. No bleeding seen.    Advised that she needs to be seen for BP and pain.  Will go to ED.    Iggy Bernstein, RN, BSN                 Additional Information    Negative: Difficult to awaken or acting confused (e.g., disoriented, slurred speech)    Negative: Severe difficulty breathing (e.g., struggling for each breath, speaks in single words)    Negative: [1] Weakness of the face, arm or leg on one side of the body AND [2] new onset    Negative: [1] Numbness (i.e., loss of sensation) of the face, arm or leg on one side of the body AND [2] new onset    Negative: [1] Chest pain lasts > 5 minutes AND [2] history of heart disease  (i.e., heart attack, bypass surgery, angina, angioplasty, CHF)    Negative: [1] Chest pain AND [2] took nitrogylcerin AND [3] pain was not relieved    Negative: Sounds like a life-threatening emergency to the triager    Negative: Symptom is main concern  (e.g., headache, chest pain)    Negative: Low blood pressure is main concern    Negative: [1] Systolic BP  >= 160 OR Diastolic >= 100 AND [2] cardiac or neurologic symptoms (e.g., chest pain, difficulty breathing, unsteady gait, blurred vision)    Negative: [1] Pregnant > 20 weeks (or postpartum < 6 weeks) AND [2] new hand or face swelling    Negative: [1] Pregnant > 20 weeks AND [2] BP Systolic BP  >= 140 OR Diastolic >= 90    Negative: [1] Systolic BP  >= 200 OR Diastolic >= 120  AND [2] having NO cardiac or neurologic symptoms    Negative: [1] Postpartum < 6 weeks AND [2] BP Systolic BP  >= 140 OR Diastolic >= 90    Negative: [1] Systolic BP  >= 180 OR Diastolic >= 110 AND [2] missed most recent dose of blood pressure medication    Negative: Systolic BP  >= 180 OR  Diastolic >= 110    Systolic BP  >= 160 OR Diastolic >= 100    Negative: Ran out of BP medications    Negative: Foreign body in rectum    Negative: Diarrhea is main symptom    Negative: Constipation is main symptom (e.g., pain or discomfort caused by passage of hard BMs)    Negative: Blood in or on bowel movement is main symptom    Negative: Pregnant    Negative: Pinworms are suspected (rectal itching; (white thread-like worm about size of a staple, moves)    Negative: Sexual assault    Negative: Injury to rectum    Negative: Large mass protruding out of rectum    MODERATE-SEVERE rectal pain (i.e., interferes with school, work, or sleep)    Negative: SEVERE rectal pain (e.g., excruciating, unable to have a bowel movement)    Negative: [1] Rectal pain or redness AND [2] fever    Negative: [1] Sudden onset rectal pain AND [2] constipated (straining, rectal pressure or fullness) AND [3] NOT better after SITZ bath, suppository or enema    Negative: Patient sounds very sick or weak to the triager    Protocols used: HIGH BLOOD PRESSURE-A-AH, RECTAL SYMPTOMS-A-AH

## 2020-05-18 NOTE — TELEPHONE ENCOUNTER
Left a message for patient to follow up on today's visit and will call back tomorrow.  DIANE ArciniegaC

## 2020-05-18 NOTE — TELEPHONE ENCOUNTER
Writer called patient to set up a follow up with Roma. Patient stated through a phone  that she no longer uses our clinic and goes to the [facility]. Call was concluded.  Cesar Miller MA

## 2020-05-18 NOTE — TELEPHONE ENCOUNTER
Please assist patient in getting scheduled with [facility] for pain management of low back pain. Referral was placed on 5/6 and patient was anticipating call to schedule. I told her we would get her scheduled and call her with the day and time. She has no days or times that do not work for scheduling.  Thanks,  Lashay Street, CNP

## 2020-05-18 NOTE — TELEPHONE ENCOUNTER
Reason for call:  Patient reporting a symptom    Symptom or request: high BP and pulse    Duration (how long have symptoms been present): now    Have you been treated for this before? No    Additional comments: Patient says BP is 174/96, pulse 124.    Phone Number patient can be reached at:  Home number on file [phone] (home)    Best Time:  any    Can we leave a detailed message on this number:  YES    Call taken on 5/18/2020 at 5:33 PM by Genie Luo

## 2020-05-18 NOTE — TELEPHONE ENCOUNTER
" Health Call Center    Phone Message    May a detailed message be left on voicemail: yes     Reason for Call: Symptoms or Concerns     If patient has red-flag symptoms, warm transfer to triage line    Current symptom or concern: Patient had a hard BM today and rectum hurts.  Patient cannot side and needs to sit to side.  Patient also noticed there is something hanging from rectum about 4\" long.    Symptoms have been present for:  1 day(s)    Has patient previously been seen for this? No    Are there any new or worsening symptoms? Yes: Pain and something 4\" long hanging from rectum.      Action Taken: Message routed to:  [facility]    Travel Screening: Not Applicable                                                                        "

## 2020-05-19 NOTE — TELEPHONE ENCOUNTER
LMTC< please inform patient that she is scheduled for     Next 5 appointments (look out 90 days)    May 22, 2020 11:00 AM CDT  Office Visit with NATA Holland CNP  [facility] [address]  [facility] [address]  [phone]        We are unable to schedule an in person  due to COVID restrictions  Thanks  Marie Cuevas RT (R)

## 2020-05-19 NOTE — TELEPHONE ENCOUNTER
Patient states any time she uses nasal sprays it causes nose bleeds.  Would you still like her to do this.

## 2020-05-19 NOTE — TELEPHONE ENCOUNTER
Called and left message for patient. If she calls back you can tell her that there is no infection in the ear. I recommend she start Flonase nasal spray once daily for in each nostril for allergies. I sent the prescription for Flonase to her pharmacy.  We have been playing phone tag so want her to get some information if she calls back. I will also call her later again to ask if she has any questions.  Lashay Street, CNP

## 2020-05-19 NOTE — TELEPHONE ENCOUNTER
Patient is also wondering if she needs to come in for a pelvic exam with the redness and itching in her vaginal are.

## 2020-05-19 NOTE — TELEPHONE ENCOUNTER
Patient would like to be seen for pelvic exam for redness and itching. She would like to see Kat Trimble this week. Please schedule with an in-person  as the last time she was in the clinic the ipad was not working. Patient would like to be called with the day and time of the appointment. She said her schedule is open.  Thanks,  Humera Street, CNP

## 2020-05-19 NOTE — TELEPHONE ENCOUNTER
MANUEL using . Told pt she can start taking a daily fiber supplement once a daily to help soften the stools so it doesn't hurt to pass her stool. Also reminded pt the string coming from her rectum is most likely a stitch that we talked about before and this will dissolve on it's own when her body heals.

## 2020-05-20 ENCOUNTER — TELEPHONE (OUTPATIENT)
Dept: FAMILY MEDICINE | Facility: OTHER | Age: 30
End: 2020-05-20

## 2020-05-20 ENCOUNTER — TELEPHONE (OUTPATIENT)
Dept: AUDIOLOGY | Facility: OTHER | Age: 30
End: 2020-05-20

## 2020-05-20 ENCOUNTER — NURSE TRIAGE (OUTPATIENT)
Dept: FAMILY MEDICINE | Facility: OTHER | Age: 30
End: 2020-05-20

## 2020-05-20 NOTE — TELEPHONE ENCOUNTER
Reason for call:  Patient reporting a symptom    Symptom or request: shooting pain down left side/ left leg, back spasms    Duration (how long have symptoms been present): last night    Have you been treated for this before? No    Additional comments: Patient stated the pain is intense and started last night. She is asking to speak with a nurse right now.     Phone Number patient can be reached at:  Home number on file [phone] (home)    Best Time:  any    Can we leave a detailed message on this number:  YES    Call taken on 5/20/2020 at 8:03 AM by Genie Luo

## 2020-05-20 NOTE — TELEPHONE ENCOUNTER
Reason for Call:  Other call back    Detailed comments: Patient called clinic. Her hearing aids are broken and she is asking that these be replaced today. She said she can drive up to [place] today if need be. She needs them right away preferably today. She also has an appointment on Friday which she will definitely need them for.     Phone Number Patient can be reached at: Home number on file [phone] (home)    Best Time: any    Can we leave a detailed message on this number? YES    Call taken on 5/20/2020 at 8:00 AM by Genie Luo

## 2020-05-20 NOTE — TELEPHONE ENCOUNTER
Spoke to KV, recommend ER for hip pain today - cannot treat this virtually. Also ask for care of vaginal concerns while in ER.     Agrees, she is still PUI/has not been symptom free from fever/cough for 2 weeks.    Patient is currently scheduled for in clinic with with KV on 5/22 for unrelated vaginal concerns. Has also been treated for vag yeast infection recently. She may need a wet prep. Cannot do self-wet prep or pelvic exam curbside. Per KV, leave in person visit scheduled as is in case an ED f/u visit is needed. Have pt go to ER and call clinic with update after discharge.    Cancelled her visit for today with KV.     5/20/2020 9:06 AM -  Called patient, left message to call back.    Marianna Layne, BSN, RN, PHN

## 2020-05-20 NOTE — TELEPHONE ENCOUNTER
Please ask audiology Sally to see if she is available to see the patient right away.  Vargas Langley MD

## 2020-05-20 NOTE — TELEPHONE ENCOUNTER
Left message informing the patient that she can drop the hearing aids off at the [facility] today and I will take a look at them.    Michelle Harris, CCC-A  5/20/2020

## 2020-05-20 NOTE — TELEPHONE ENCOUNTER
I am glad she is getting this evaluated. Let me know what I can do to help her after ER.       NATA Retana CNP  Questions or concerns please feel free to send me a [employer] message or call me  Phone : [phone]

## 2020-05-20 NOTE — TELEPHONE ENCOUNTER
She's in the ED now.   She is upset, they are sending her home.   She states they didn't do anything and she is still in extreme pain.   I told her that she needs to voice her concerns to the provider.   She wants to keep the appointment on Friday to discuss pain medication - I did advise that we likely not be able to assist her in what she is wanting, but will have KV review.     Ines Roy, RN, BSN

## 2020-05-20 NOTE — TELEPHONE ENCOUNTER
I am sorry to hear that. I will see her Friday. I am not going to do any pain medication prescription. We can talk about other options ( see note from CDL below) and she should continue to follow with [facility] as well as I see this was recommended. I will also help her with her vaginal concerns.       See message by CDL:    3  &4. Back pain   - Continues to ask for pain medications   - Declined   - Discussed conservative cares to continue   - Has seen many back doctors, all said nothing left to do for her, recommend [facility] for possible other treatments of her back   - She has Tylenol, muscle relaxer, medical cannabis, Cymbalta, and Gabapentin     NATA Retana CNP  Questions or concerns please feel free to send me a [employer] message or call me  Phone : [phone]

## 2020-05-20 NOTE — PROGRESS NOTES
"Subjective     Katy Islas is a 30 year old female who presents to clinic today for the following health issues:    HPI   {SUPERLIST (Optional):833101}  {additonal problems for provider to add (Optional):457572}    {HIST REVIEW/ LINKS 2 (Optional):667201}    Reviewed and updated as needed this visit by Provider         Review of Systems   {ROS COMP (Optional):728370}      Objective    There were no vitals taken for this visit.  There is no height or weight on file to calculate BMI.  Physical Exam   {Exam List (Optional):473380}    {Diagnostic Test Results (Optional):380996::\"Diagnostic Test Results:\",\"Labs reviewed in Epic\"}        {PROVIDER CHARTING PREFERENCE:221693}    "

## 2020-05-20 NOTE — TELEPHONE ENCOUNTER
Patient returned call, relayed KV's recommendations. She agrees to go to [place] ER for her hip and vaginal concerns now, then call the clinic with an update to discuss the need for her 5/22 appointment.    Leaving encounter open to follow up with patient tomorrow if she does not call back.  Marianna Layne, EDYTAN, RN, PHN

## 2020-05-20 NOTE — TELEPHONE ENCOUNTER
Spoke to patient, states she has been struggling with pain that started yesterday. L hip and L leg.  Had to call the police to help her get out of her car last night d/t the severe pain she was having.  Pain is 6/10  Can only take a few steps, then gets gets a 'marco antonio horse' pain in her L hip w/ shooting pain that starts in the hip, radiates down the leg  Hx SI joint surgery with screws, approx 1.5 years ago  No known injury, but overuse yesterday - planted flowers, lots of bending  Leg is     Flexeril and Tylenol are not helping  Ice, warm bath, and Deep Blue cream    Disposition: see in clinic today  Reviewed home care advice for hip pain  Was in the ED 2 x last week for cough and fever  Was tested for Covid-19 5/11/20, negative result   Fails screening, so recommended urgent care or ER  - She declines these options, 'they never give me anything, they never do anything for my pain'  - She insists on a visit today with a clinic provider  - Agreed to hold a spot for her this afternoon with KV and huddle with KV to see what she recommends (held 3:20 spot for 40 min tele visit)  - patient agrees with this plan    Additional Information    Negative: Looks like a broken bone or dislocated joint (e.g., crooked or deformed)    Negative: Sounds like a life-threatening emergency to the triager    Negative: Followed a hip injury    Negative: Leg pain is main symptom    Negative: Back pain radiating (shooting) into hip    Negative: SEVERE pain (e.g., excruciating, unable to do any normal activities) and fever    Negative: Can't stand (bear weight) or walk    Negative: Fever and red area (or area very tender to touch)    Negative: Patient sounds very sick or weak to the triager    SEVERE pain (e.g., excruciating, unable to do any normal activities)    Protocols used: HIP PAIN-A-OH    Routed to KV.  Marianna Layne, EDYTAN, RN, PHN

## 2020-05-21 ENCOUNTER — MYC MEDICAL ADVICE (OUTPATIENT)
Dept: OBGYN | Facility: OTHER | Age: 30
End: 2020-05-21

## 2020-05-21 NOTE — TELEPHONE ENCOUNTER
As of 3/16/2020 pt's surgery was cancelled    Total laparoscopic hysterectomy with bilateral salpingectomy, cystoscopy original date of surgery 3/25/2020.    RN routing to Dr. Berumen for timing of rescheduling surgery.    Claudine Monson RN on 5/21/2020 at 3:42 PM

## 2020-05-21 NOTE — TELEPHONE ENCOUNTER
Patient does not need OV in clinic. Can do virtual appointment.   Please change to virtual. She was seen for back pain and given Mederol dose adriana yesterday and should start this. I will Not be prescribing any pain medications for her as I do not feel it is appropriate and this is also what her PCP has been doing as well. She needs to also contact [facility] as this has been recommended as well.   She also had a wet prep that was negativ e and STD testing that is negative. I do not feel she needs a physical assessment.       Kat Trimble CNP

## 2020-05-21 NOTE — TELEPHONE ENCOUNTER
Routed to  to discuss coming to clinic with SF, as pt was seen in ED 5/13/20 for cough/fever.    Marianna Layne, BSN, RN, PHN

## 2020-05-21 NOTE — TELEPHONE ENCOUNTER
I spoke with the pt who states she has been trying to do laundry, vacuum, mop and other household chores. Feet are painful and swollen. The toes have become a little purple. She is not able to stand or be on her feet. Can't walk. She started the Mederol Dose pack today.  Homecares given.    Has appt with jas next Wednesday at 1PM.     Claudine Jimenez, MSN, RN

## 2020-05-21 NOTE — TELEPHONE ENCOUNTER
Called patient using ASL[employer]TTY . Relayed message below. Patient verbalizes agreement to start Mederol dose adriana and continue recommendations per ED (and previously by PCP) for managing hip/back pain until tomorrow. Changed in clinic visit to telephone visit for tomorrow.    Patient verbalizes understanding of the plan and denies further needs at this time.    Routed FYI only to .  Marianna Layne, EDYTAN, RN, PHN

## 2020-05-22 ENCOUNTER — VIRTUAL VISIT (OUTPATIENT)
Dept: FAMILY MEDICINE | Facility: OTHER | Age: 30
End: 2020-05-22
Payer: MEDICARE

## 2020-05-22 ENCOUNTER — TRANSFERRED RECORDS (OUTPATIENT)
Dept: HEALTH INFORMATION MANAGEMENT | Facility: CLINIC | Age: 30
End: 2020-05-22

## 2020-05-22 ENCOUNTER — TELEPHONE (OUTPATIENT)
Dept: AUDIOLOGY | Facility: CLINIC | Age: 30
End: 2020-05-22

## 2020-05-22 ENCOUNTER — NURSE TRIAGE (OUTPATIENT)
Dept: NURSING | Facility: CLINIC | Age: 30
End: 2020-05-22

## 2020-05-22 ENCOUNTER — TELEPHONE (OUTPATIENT)
Dept: FAMILY MEDICINE | Facility: OTHER | Age: 30
End: 2020-05-22

## 2020-05-22 ENCOUNTER — MYC MEDICAL ADVICE (OUTPATIENT)
Dept: FAMILY MEDICINE | Facility: OTHER | Age: 30
End: 2020-05-22

## 2020-05-22 DIAGNOSIS — J30.1 SEASONAL ALLERGIC RHINITIS DUE TO POLLEN: ICD-10-CM

## 2020-05-22 DIAGNOSIS — J45.20 MILD INTERMITTENT ASTHMA WITHOUT COMPLICATION: ICD-10-CM

## 2020-05-22 DIAGNOSIS — B37.0 THRUSH: ICD-10-CM

## 2020-05-22 DIAGNOSIS — M54.50 LOW BACK PAIN, UNSPECIFIED BACK PAIN LATERALITY, UNSPECIFIED CHRONICITY, UNSPECIFIED WHETHER SCIATICA PRESENT: ICD-10-CM

## 2020-05-22 DIAGNOSIS — G89.4 CHRONIC PAIN SYNDROME: Primary | ICD-10-CM

## 2020-05-22 PROCEDURE — 99443: CPT | Performed by: NURSE PRACTITIONER

## 2020-05-22 RX ORDER — NYSTATIN 100000/ML
500000 SUSPENSION, ORAL (FINAL DOSE FORM) ORAL 4 TIMES DAILY
Qty: 280 ML | Refills: 0 | Status: SHIPPED | OUTPATIENT
Start: 2020-05-22 | End: 2020-06-10

## 2020-05-22 ASSESSMENT — ENCOUNTER SYMPTOMS
FEVER: 0
CHILLS: 0
CARDIOVASCULAR NEGATIVE: 1
COUGH: 1

## 2020-05-22 NOTE — TELEPHONE ENCOUNTER
Reason for Call:  Medication or medication refill:    Do you use a [facility]?  Name of the pharmacy and phone number for the current request:  [facility] [id number] [place], [place] - [facility]     Name of the medication requested: Ibuprofen 800, robaxin and Z adriana    Other request: Patient is needing refills    Can we leave a detailed message on this number? YES    Phone number patient can be reached at: Home number on file [phone] (home)    Best Time: any    Call taken on 5/22/2020 at 12:14 PM by Souleymane Garcia

## 2020-05-22 NOTE — TELEPHONE ENCOUNTER
I addressed the Ibuprofen, what is she wanting the Zpak for we did not discuss this in our visit. As for the Robaxin she has flexeril I do not think she should be doing both.       NATA Retana CNP  Questions or concerns please feel free to send me a [employer] message or call me  Phone : [phone]

## 2020-05-22 NOTE — PATIENT INSTRUCTIONS
- Start Nystatin swish and swallow 4 times daily for 14   Patient Education     Candida Infection: Thrush  Thrush is a fungal infection in the mouth and throat. Thrush does not usually affect healthy adults. It is more common in people with a weak immune system. It is also more likely if you take antibiotics. Thrush is normally not contagious.  Understanding fungus in the mouth and throat  Your mouth and throat normally contain millions of tiny organisms. These include bacteria and yeasts. Many of these do not cause any problems. In fact, they may help fight disease.  Yeasts are a type of fungus. A type of yeast called Candida normally lives on the membranes of your mouth and throat. Usually, this yeast grows only in small amounts and is harmless. But in some cases, Candida can grow out of control and cause thrush. Thrush is related to other kinds of Candida infections that can grow all over the body. Thrush refers to an infection of only the mouth and throat.  What causes thrush?  Thrush happens when something lets too much Candida grow inside your mouth and throat. Certain things that change the normal balance of organisms in the mouth can lead to thrush. One example is antibiotic medicine. This medicine may kill some of the normal bacteria in your mouth. Candida can then grow freely. People on antibiotics have an increased risk for thrush.  You have a higher risk for thrush if you:    Wear dentures    Are getting chemotherapy    Are getting radiation therapy    Have diabetes    Have a transplanted organ    Use corticosteroids, including inhaled corticosteroids for lung disease    Have a weak immune system, such as from AIDS    Are an older adult  Symptoms of thrush  Symptoms of thrush can include:    A dry, cottony feeling in your mouth    Cracking at the corners of the mouth    Loss of taste    Pain while eating or swallowing    White patches on the tongue and around the sides of the mouth  Diagnosing  thrush  Your healthcare provider will ask about your medical history and your symptoms. He or she will look closely at your mouth and throat. White or red patches will be scraped with a tongue depressor. The sample will be sent to a lab to test. This test can usually confirm thrush.  If you have thrush, you may also have esophageal candidiasis. This is common in people who have HIV or a weak immune system. Your healthcare provider may check for this condition with an upper endoscopy. This is a procedure to look at the esophagus. A tissue sample may be taken to test.  Treatment for thrush  Thrush is usually treated with antifungal medicine. The medicine is put directly in your mouth and throat. You may be given a  swish and swallow  medicine or an antifungal lozenge.  In some cases, you may need an antifungal pill. This can remove Candida throughout your body. Or you may need medicine through an intravenous line ( IV). These treatments depend on how severe your infection is, and what other health conditions you have.  If you are at high risk for thrush, you may need to keep taking oral antifungal medicine. This is to help prevent thrush in the future.  What happens if you don t get treated for thrush?  If untreated, the Candida may spread throughout your body. They may even enter your bloodstream. This can cause serious problems, such as organ failure and even death. Bloodstream infection may need to be treated with high doses of antifungal medicine through an IV.  Systemic infection is much more likely in people who are very ill. It is also more common in those who have serious problems with their immune system. Additional risk factors for systemic infection in very ill people include:    Central venous lines    IV nutrition    Use of broad-spectrum antibiotics    Kidney failure    Recent surgery  Preventing thrush  You may be able to help prevent some cases of thrush. Make sure to:    Practice good oral hygiene.  Try using a chlorhexidine mouthwash.    Clean your dentures regularly as instructed. Make sure they fit you correctly.    After using a corticosteroid inhaler, rinse out your mouth with water or mouthwash.    Do not use broad-spectrum antibiotics, if possible.    Get treated for health problems that increase your risk for thrush, such as diabetes.     When to call the healthcare provider  Call your healthcare provider right away if you have any of these:    Cottony feeling in your mouth    Loss of taste    Pain while eating or swallowing    White patches or plaques on your tongue or inside your mouth   Date Last Reviewed: 5/1/2017 2000-2019 [employer]. [address] [place] [address], [place] [address]. All rights reserved. This information is not intended as a substitute for professional medical care. Always follow your healthcare professional's instructions.

## 2020-05-22 NOTE — TELEPHONE ENCOUNTER
Patient calling back.  She wants to know if the pictures she sent thru my chart were received?  Please advise as she wants to leave to go get the medication.  Thank you.

## 2020-05-22 NOTE — TELEPHONE ENCOUNTER
Monique Berumen, DO  You 11 minutes ago (1:36 PM)       Preston Burns,     I am sorry to hear that your cramping is not improving. I agree with the RN recommendations. 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 I would like to wait to do your hysterectomy until you have recovered from both of these things. I see that you have an appointment with your Primary care provider next week for virtual visit, I would like to wait until you can be cleared for surgery before scheduling the procedure. At that time, I am happy to have the schedulers call you to set it up.     Thank you,   Monique Berumen, DO    Message text      Claudine Monson RN on 5/22/2020 at 1:49 PM

## 2020-05-22 NOTE — TELEPHONE ENCOUNTER
Unsure who Dr Howell is (not a provider in Audiology clinic). Message sent to call center to call patient back and give her the options for hearing aid services outlined in the current protocol.    Amira Barr.  Licensed Audiologist  [place] #[id number]      [facility] Call Center    Phone Message    May a detailed message be left on voicemail: yes     Reason for Call: Other: Pt would like a call back as she needs a tube replacement for her hearing aids and was told Dr. Howell would be at the [facility] today. PLease reach out to pt to discuss     Action Taken: Message routed to:  [facility] ([facility]): Audiology    Travel Screening: Not Applicable

## 2020-05-22 NOTE — TELEPHONE ENCOUNTER
Reason for call:  Patient reporting a symptom    Symptom or request:  Patient has a phone visit already scheduled for today with Kat Trimble for ED follow up, the notes say for vaginal concerns and back/hip pain.   She is calling now requesting to have an 'in clinic' appointment instead as she states she has developed blisters in her mouth and throat. She states her brother ws just diagnosed with a rare disease that has the same symptoms of blisters in mouth and can be fatal if untreated per patient.   Please advise.  Thank you.     Duration (how long have symptoms been present): unknown    Have you been treated for this before? No    Additional comments:  none    Phone Number patient can be reached at:  Home number on file [phone] (home)    Best Time:  any    Can we leave a detailed message on this number:  YES    Call taken on 5/22/2020 at 9:39 AM by Damaris Monroe

## 2020-05-22 NOTE — TELEPHONE ENCOUNTER
Katy,     For the nose I recommend applying some Vaseline ointment to the area, could be from congestion from your sinus infection the beginning of this month.  Recommend also using humidification at night as well.      As for the mouth, yes I think we should continue with our plan to use the nystatin. I also reviewed your Albuterol inhaler and neb, you have plenty of refills on file so I recommend contacting your pharmacy for refills. Given your history of acid reflux I do not recommend that much of ibuprofen. I would recommend a low dose of ibuprofen over the counter 200-400mg.       NATA Retana CNP  Questions or concerns please feel free to send me a [employer] message or call me  Phone : [phone]

## 2020-05-22 NOTE — TELEPHONE ENCOUNTER
Huddled with KV, who huddled with SF. Unable to offer face to face visit d/t high risk of covid19. Writer will call patient and inform and ask her to prioritize problems needing to be discussed during her telephone visit today at 11pm.    Returned patient's call.    Onset 3 weeks: Her throat feels very dry and strange, sometimes her voice is hoarse, so she drinks something, then it improves.   Voice horsness x a few months.  Looks like blisters in the back of her throat.    Her brother has similar symptoms, has to have a bx on his mouth now - they think he has a 'rare disease, but I cannot remember what it is called, however, it is fatal.' 'I need to stop smoking.'    Explained not possible to be seen in person today. Discussed unable to discuss all problems in 1 visit today. Asked her to prioritize.  She agrees to wait to discuss throat issues until next week with her PCP. Scheduled tele visit for 5/28 @ 10:30 for 60 min.   Reviewed need to start with virtual/tele visit to come up with a plan - if tests or limited exam in person is required, will make a plan for them during the virtual visit. She verbalizes understanding.    Updated KV.  Marianna Layne, BSN, RN, PHN

## 2020-05-22 NOTE — PROGRESS NOTES
"Katy Islas is a 30 year old female who is being evaluated via a billable telephone visit.      The patient has been notified of following:     \"This telephone visit will be conducted via a call between you and your physician/provider. We have found that certain health care needs can be provided without the need for a physical exam.  This service lets us provide the care you need with a short phone conversation.  If a prescription is necessary we can send it directly to your pharmacy.  If lab work is needed we can place an order for that and you can then stop by our lab to have the test done at a later time.    Telephone visits are billed at different rates depending on your insurance coverage. During this emergency period, for some insurers they may be billed the same as an in-person visit.  Please reach out to your insurance provider with any questions.    If during the course of the call the physician/provider feels a telephone visit is not appropriate, you will not be charged for this service.\"    Patient has given verbal consent for Telephone visit?  Yes    What phone number would you like to be contacted at? [phone]    How would you like to obtain your AVS? MyChart    Subjective     Katy Islas is a 30 year old female who presents via phone visit today for the following health issues:    HPI     Concern - F/U hip and back pain     Description:   Its hard to do things around the house, especially in the morning. Affecting the left leg and middle of back.  This had gotten a lot worse the last few days         Appointment with [facility] next Wednesday.   Patient started talking about her pain then started in on her throat, nose and multiple topics,     \"Pain is bad by the end of the day. She can't tolerate this anymore. It is too much. I am noticing that the nose and throat are red and it looks like scabs in my nose. I took a picture to send you. Throat is white spots, maybe blisters I am not sure. " "My brother is supposed to have a mouth biopsy because his doctor suspects that he has a rare disease that is causing blisters in the mucous membrane and is not sure if this will be fatal or not.\"     \" Chest and lungs feel like something is in there, cough and cough. In February she had pneumonia. And she coughed blood and quit smoking after this. My nose and throat today feel like a burning sensation, not comfortable at all\"     She showed the interpretor her tongue looks like thrush to the interpretor.     Coughs so hard that she gets dizzy sometimes.   Sometimes I cough hard and sneeze at the same time, I hate that.       Vaginal Symptoms  Onset: a few weeks ago   Vagina is very red and irritated. Brother has a disease that causes blisters in the mucus membrane and also has blisters in his genital area, pt is unsure if this is hereditary.     Description:  Vaginal Discharge: none   Itching (Pruritis): no   Burning sensation:  no   Odor: no     Accompanying Signs & Symptoms:  Pain with Urination: no   Abdominal Pain: this morning had some cramps   Fever: no     History:   Sexually active: no   New Partner: no   Possibility of Pregnancy:  No    Precipitating factors:   Recent Antibiotic Use: YES      Went into Emergency Room at [facility] on 5/18  Also went to ED [facility] 5/18-   CT on 5/18 at [facility] was normal without any perirectal abscess or collection. No Perforation.   CBC was normal.     MDM from ED visit at [facility] 5/20:     MDM:   Katy Islas is a 30 y.o. female with an extensive past medical history as noted above who presents to the ED today for left lower back pain and vaginal discharge. With regards the back pain I considered cauda equina syndrome, lumbar radiculopathy, discitis, osteomyelitis, among others. With regards to the vaginal discharge I did consider yeast infection, bacterial vaginosis, and STD. The patient did not have any abdominal pain. On " initial exam, the patient was neurovascularly intact in bilateral lower extremities as noted in the exam above. She did not have any evidence of numbness or weakness to raise my concern for cauda equina syndrome or nerve damage. Although she did use a wheelchair to get from her car back into the emergency department, her nurse did witness her get up and use the commode in her room without any difficulty. She has not had any fevers or other signs of recent illness to raise my concern for a infectious process of her pain such as discitis or osteomyelitis. 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 The patient has had no further episodes of incontinence of bowel or bladder, and I do not think this is representative of cauda equina syndrome in her case. At this point, I feel this is most consistent with a lumbar radiculopathy given her pain radiating from her left low back down the back of her leg into her toe. With regards to her vaginal discharge, I did perform a pelvic exam and obtain a wet prep and STD swabs. She did have some mild erythema around the introitus with some thin white discharge. Wet prep did come back negative for any abnormalities. She will be called if her chlamydia or gonorrhea results come back positive. She was not concerned for STDs, and stated that she is not been sexually active for the last 2 months, so I did not feel that treating her prophylactically here today was necessary. At this point, I feel the patient is safe to be discharged home with close follow-up with her PCP. Offered to give the patient a short course of prednisone, but she declined as she says that this makes her angry. She was given 40 mg in the department today. She says that she has a Medrol Dosepak at home from a prior visit that she would rather take. I described to her how this is meant to be  taken and she verbalized her understanding. I discussed the importance of following up with her primary care provider should she continue to have back pain after taking the Medrol Dosepak. We discussed reasons to follow-up sooner if she develops any numbness, weakness, bowel or bladder incontinence, fevers, or any other concerns. With regards to her vaginal discharge, I did discuss that sometimes wet preps can be negative and may need to be repeated if she continues to have symptoms. I discussed that should she continue to have the vaginal discharge, she should have a repeat swab done in 1 week by her primary care provider. She verbalized her understanding of this. She will be discharged home with instructions to take the Medrol Dosepak that she already has.  DIAGNOSIS:   1.   ICD-10-CM   1. Lumbar radiculopathy, acute M54.16   2. Vaginal discharge N89.8         Current Outpatient Medications   Medication Sig Dispense Refill     acetaminophen (TYLENOL) 500 MG tablet Take 2 tablets (1,000 mg) by mouth 3 times daily as needed for mild pain 100 tablet 3     albuterol (PROAIR HFA/PROVENTIL HFA/VENTOLIN HFA) 108 (90 Base) MCG/ACT inhaler Inhale 2 puffs into the lungs every 6 hours as needed for shortness of breath / dyspnea or wheezing 3 Inhaler 3     albuterol (PROVENTIL) (2.5 MG/3ML) 0.083% neb solution Take 1 vial (2.5 mg) by nebulization every 6 hours as needed for shortness of breath / dyspnea or wheezing 50 vial 11     alum & mag hydroxide-simethicone (MAALOX ADVANCED MAX ST) 400-400-40 MG/5ML SUSP suspension Take 15 mLs by mouth every 4 hours as needed for indigestion (mix with lidocaine) 355 mL 11     ARIPiprazole (ABILIFY) 15 MG tablet Take 1 tablet (15 mg) by mouth At Bedtime 90 tablet 3     aspirin (ASA) 81 MG tablet Take 1 tablet (81 mg) by mouth daily 90 tablet 3     azelastine (OPTIVAR) 0.05 % ophthalmic solution Place 1 drop into both eyes 2 times daily 6 mL 11     buPROPion (WELLBUTRIN XL) 150 MG 24 hr  tablet Take 1 tablet (150 mg) by mouth every morning 30 tablet 1     busPIRone (BUSPAR) 15 MG tablet Take 1 tablet (15 mg) by mouth 2 times daily 60 tablet 5     clonazePAM (KLONOPIN) 1 MG tablet Take 1 tablet (1 mg) by mouth 2 times daily as needed for anxiety 28 tablet 0     cyclobenzaprine (FLEXERIL) 10 MG tablet Take 1 tablet (10 mg) by mouth 3 times daily as needed for muscle spasms or other (back pain) 90 tablet 3     diphenhydrAMINE (BENADRYL) 50 MG capsule Take 1-2 capsules ( mg) by mouth every 6 hours as needed for itching, allergies or sleep 90 capsule 3     DULoxetine (CYMBALTA) 60 MG EC capsule Take 1 capsule (60 mg) by mouth daily 90 capsule 3     EPINEPHrine (ANY BX GENERIC EQUIV) 0.3 MG/0.3ML injection 2-pack Inject 0.3 mLs (0.3 mg) into the muscle once as needed for anaphylaxis 0.6 mL 3     fexofenadine (ALLEGRA) 180 MG tablet Take 1 tablet (180 mg) by mouth daily 90 tablet 3     fluticasone (FLONASE) 50 MCG/ACT nasal spray Spray 1 spray into both nostrils daily 16 g 1     gabapentin (NEURONTIN) 300 MG capsule Take 1 capsule (300 mg) by mouth 3 times daily 270 capsule 3     lidocaine (XYLOCAINE) 2 % solution Swish and swallow 15 mLs in mouth every 4 hours as needed for other (GERD) ; Max 8 doses/24 hour period. Mix with 15 mLs Maalox or Mylanta. 100 mL 3     losartan (COZAAR) 100 MG tablet Take 1 tablet (100 mg) by mouth daily 90 tablet 3     medical cannabis (Patient's own supply) (The purpose of this order is to document that the patient reports taking medical cannabis.  This is not a prescription, and is not used to certify that the patient has a qualifying medical condition.) 0 Information only 0     medroxyPROGESTERone (DEPO-PROVERA) 150 MG/ML IM injection Inject 1 mL (150 mg) into the muscle every 3 months 3 mL 3     methocarbamol (ROBAXIN) 750 MG tablet Take 1 tablet (750 mg) by mouth 3 times daily as needed for muscle spasms 60 tablet 3     metoprolol succinate ER (TOPROL-XL) 200 MG 24  hr tablet Take 1 tablet (200 mg) by mouth daily 90 tablet 1     NONFORMULARY Apply 30 mLs topically 4 times daily       nystatin (MYCOSTATIN) 091428 UNIT/ML suspension Take 5 mLs (500,000 Units) by mouth 4 times daily for 14 days swish in the mouth and retain for as long as possible before swallowing. 280 mL 0     ondansetron (ZOFRAN-ODT) 8 MG ODT tab Take 1 tablet (8 mg) by mouth every 8 hours as needed for nausea 15 tablet 0     pantoprazole (PROTONIX) 40 MG EC tablet Take 1 tablet (40 mg) by mouth 2 times daily Take 30-60 minutes before a meal. 180 tablet 3     polyethylene glycol (MIRALAX) 17 g packet Take 17 g by mouth 2 times daily 60 packet 0     prazosin (MINIPRESS) 1 MG capsule Take 1 mg by mouth At Bedtime  5     prochlorperazine (COMPAZINE) 10 MG tablet Take 1 tablet (10 mg) by mouth every 6 hours as needed for nausea or vomiting 30 tablet 0     rizatriptan (MAXALT-MLT) 5 MG ODT Take 1-2 tablets (5-10 mg) by mouth at onset of headache for migraine May repeat in 2 hours. Max 6 tablets/24 hours. 18 tablet 3     HYDROmorphone (DILAUDID) 2 MG tablet Take 1 tablet (2 mg) by mouth every 4 hours as needed for moderate to severe pain (Patient not taking: Reported on 5/18/2020) 12 tablet 0     oxyCODONE-acetaminophen (PERCOCET) 5-325 MG tablet Take 1-2 tablets by mouth every 4 hours as needed for pain (Patient not taking: Reported on 5/18/2020) 12 tablet 0     sucralfate (CARAFATE) 1 GM tablet Take 1 tablet (1 g) by mouth 4 times daily as needed (heartburn) (Patient not taking: Reported on 5/18/2020) 360 tablet 3     valACYclovir (VALTREX) 1000 mg tablet 2 tabs at onset of mouth sores.  Repeat dose in 12 hours.         Reviewed and updated as needed this visit by Provider         Review of Systems   Constitutional: Negative for chills and fever.   Respiratory: Positive for cough.    Cardiovascular: Negative.              Objective   Reported vitals:  There were no vitals taken for this visit.   healthy, alert and  no distress  PSYCH: Alert and oriented times 3; coherent speech, normal   rate and volume, able to articulate logical thoughts, able   to abstract reason, no tangential thoughts, no hallucinations   or delusions  Her affect is normal  RESP: No cough, no audible wheezing, able to talk in full sentences  Remainder of exam unable to be completed due to telephone visits  Will send picture of mouth.     Diagnostic Test Results:  Reviewed Emergency Room Results.         Assessment/Plan:  1. Chronic pain syndrome      2. Low back pain, unspecified back pain laterality, unspecified chronicity, unspecified whether sciatica present      3. Seasonal allergic rhinitis due to pollen      4. Mild intermittent asthma without complication      5. Thrush      - nystatin (MYCOSTATIN) 991091 UNIT/ML suspension; Take 5 mLs (500,000 Units) by mouth 4 times daily for 14 days swish in the mouth and retain for as long as possible before swallowing.  Dispense: 280 mL; Refill: 0    Plan and Recommendations:    - Find out what disease this is that brother may have. She will let [facility]L or I know so we can look into this.   - Avoid forceful coughing to prevent sneezing and bronchospasms.   - Use albuterol, refilled neb and albuterol to every 4-6 hours prn.    - Medrol dose adriana prescribed at the Emergency Room. Hard to tell if this is helping. Had swelling in her feet and pain in the back because she was cleaning at home. At this point recommend resting no heavy lifting, cleaning or exercising until she can see [facility]. Recommend letting the mederol dose adriana work. Started this two days ago. She noticed this makes her more irritable and tantrums like she does with prednisone. Feels it may be causing her to have mood swings. She will discontinue if it feels this is causing too much irritability and unstableness in her symptoms. She is currently taking the gabapentin, tylenol, has tried both flexeril and robaxin in the past. Currently using flexeril.  Has tried heat and ice.  She is also taking Cymbalta as well every night. She has a good amount of things to help her with her pain. I recommend she continue with plan as directed by PCP to see [facility] and avoid narcotics at this time.   - In regards to the mouth sores, this sounds consistent with thrush although hard to determine. She will be sending me pictures I will put in for nystatin swish and swallow to be done 4 times daily for 14 days. I will advise she do this consistently and complete treatment.   - As far as her ears, she thinks she may need to go to the dentist, I agree with this plan. Also discussed eustachian tube dysfunction as well as monitoring. Has not had a fever for 2 weeks so unlikely ear infection. Throat sores also unlikely any relation to strep. Would consider mono spot testing if continues to have concerns.   - Recommend she also continue her inhalers as noted above, I did refill these for her. The dosepak should also help with the cough as well. I would recommend repeat covid-19 testing if no improvements in cough or worsening signs and symptoms of covid-19.   - She asked me for refills on the flexeril I advised her that she was given 90 tablets back in April 2020, so she should have 2 months left for refills.   - She also asked for Klonopin, I advised there was 28 tablets sent in May and she has no refills curious about more, I advised this will be up to her PCP.   - Called ended as patient had to use the restroom and stated she had what she needed from visit. She has appointment with [facility] next week, uncertain what this would be for. Advised unless there are worsening symptoms needs to give time for things to work. Might be a good idea to follow up with [facility] once she has seen [facility] to check in, may even just need to be mychart.     Return in about 1 week (around 5/29/2020) for PCP visit.      Phone call duration: 30  minutes    NATA Retana CNP

## 2020-05-22 NOTE — TELEPHONE ENCOUNTER
Spoke with SF. Continue with virtual and I will see what needs to happen from there.       NATA Retana CNP  Questions or concerns please feel free to send me a [employer] message or call me  Phone : [phone]

## 2020-05-23 NOTE — TELEPHONE ENCOUNTER
Call via a .  Throat is closing and back is hurting, and she has no voice.  Will call 911.    Elena Chen RN  [facility]       Reason for Disposition    Wheezing, stridor, hoarseness, or difficulty breathing    Protocols used: SWALLOWING DIFFICULTY-A-AH

## 2020-05-25 ENCOUNTER — MYC MEDICAL ADVICE (OUTPATIENT)
Dept: FAMILY MEDICINE | Facility: OTHER | Age: 30
End: 2020-05-25

## 2020-05-26 ENCOUNTER — NURSE TRIAGE (OUTPATIENT)
Dept: NURSING | Facility: CLINIC | Age: 30
End: 2020-05-26

## 2020-05-26 ENCOUNTER — TELEPHONE (OUTPATIENT)
Dept: FAMILY MEDICINE | Facility: OTHER | Age: 30
End: 2020-05-26

## 2020-05-26 ENCOUNTER — MYC MEDICAL ADVICE (OUTPATIENT)
Dept: FAMILY MEDICINE | Facility: OTHER | Age: 30
End: 2020-05-26

## 2020-05-26 DIAGNOSIS — F32.0 MILD MAJOR DEPRESSION (H): ICD-10-CM

## 2020-05-26 DIAGNOSIS — G89.4 CHRONIC PAIN SYNDROME: ICD-10-CM

## 2020-05-26 DIAGNOSIS — F43.23 ADJUSTMENT DISORDER WITH MIXED ANXIETY AND DEPRESSED MOOD: ICD-10-CM

## 2020-05-26 DIAGNOSIS — F31.63 BIPOLAR DISORDER, CURRENT EPISODE MIXED, SEVERE, WITHOUT PSYCHOTIC FEATURES (H): ICD-10-CM

## 2020-05-26 RX ORDER — CLONAZEPAM 1 MG/1
1 TABLET ORAL 2 TIMES DAILY PRN
Qty: 28 TABLET | Refills: 0 | Status: SHIPPED | OUTPATIENT
Start: 2020-05-26 | End: 2020-06-01

## 2020-05-26 RX ORDER — METHOCARBAMOL 750 MG/1
750 TABLET, FILM COATED ORAL 3 TIMES DAILY PRN
Qty: 60 TABLET | Refills: 3 | Status: SHIPPED | OUTPATIENT
Start: 2020-05-26 | End: 2020-08-26

## 2020-05-26 NOTE — TELEPHONE ENCOUNTER
Spoke to patient via . She is looking for a referral for 'mind health/body health and chronic pain' but hasn't heard back from them yet. Was referred to them by her PCP, about 1 month ago. Feeling more anxious/emotional today, which makes her chronic back pain worse, so would like to reach out to them again.  Writer reviewed recent OV notes, unable to find mention of the place she describes, or an order in referrals.    She has a virtual visit with her therapist today at 2:30pm - a free service provided to [employer]. So she feels like that will help with her current mood today for now.      Also requests Robaxin and Klonopin refills    Informed pt that Klonopin refill request has already been routed to CD[facility], is in process.    Pending Prescriptions:                       Disp   Refills    methocarbamol (ROBAXIN) 750 MG tablet     60 tab*3            Sig: Take 1 tablet (750 mg) by mouth 3 times daily as           needed for muscle spasms    Sent 3/3/20 with 60#, 3 refill supply.   - Per 5/22/20 note, states [facility] filled Flexeril for her, not Robaxin.  - Pt states Flexeril does not work well every time she takes it. Is out of Robaxin.  - Pt states she has used all refills sent 3/3/20; pharmacy says she is out of refills.  -- per Outside Meds review, she has used all refills    Agreed to route refill request to CDL and also ask her for the name of the referral site she wants her to schedule with.    Routed to [facility].  Marianna Layne, BSN, RN, PHN

## 2020-05-26 NOTE — TELEPHONE ENCOUNTER
Katy is calling via  #[phone].    Katy is very upset and frustrated. She has been trying to get prescriptions for Robaxin and Klonopin    She reports that she is in a great deal of discomfort and that she is almost out of these medications.    Notified of:  Marianna Layne RN           5/26/20 2:26 PM   Note      . . . . . . . . .  . . . . . . . .     Also requests Robaxin and Klonopin refills     Informed pt that Klonopin refill request has already been routed to [facility], is in process.     Pending Prescriptions:                       Disp   Refills    methocarbamol (ROBAXIN) 750 MG tablet     60 tab*3            Sig: Take 1 tablet (750 mg) by mouth 3 times daily as           needed for muscle spasms    Sent 3/3/20 with 60#, 3 refill supply.   - Per 5/22/20 note, states [facility] filled Flexeril for her, not Robaxin.  - Pt states Flexeril does not work well every time she takes it. Is out of Robaxin.  - Pt states she has used all refills sent 3/3/20; pharmacy says she is out of refills.  -- per Outside Meds review, she has used all refills     Agreed to route refill request to CDL and also ask her for the name of the referral site she wants her to schedule with.     Routed to CD[facility].  Marianna Layne, BSN, RN, PHN        Notified Katy that medications are pending the approval of her provider, Aura Dahlheimer-Lawson    Call ended by patient.    Maribel Burgos RN  [facility]      Reason for Disposition    Caller requesting a NON-URGENT new prescription or refill and triager unable to refill per unit policy    Protocols used: MEDICATION QUESTION CALL-A-

## 2020-05-26 NOTE — TELEPHONE ENCOUNTER
See other encounters regarding throat symptoms.    Flagged for CDL as FYI regarding name of disease pt's brother will be having a biopsy for.  Marianna Layne, BSN, RN, PHN

## 2020-05-26 NOTE — TELEPHONE ENCOUNTER
Reason for Call:  Other call back    Detailed comments: Patient wants a nurse to call her to discuss her thrush     Phone Number Patient can be reached at: Home number on file [phone] (home)    Best Time: any    Can we leave a detailed message on this number? YES    Call taken on 5/26/2020 at 7:10 AM by Souleymane Garcia

## 2020-05-26 NOTE — TELEPHONE ENCOUNTER
Writer attempted to reach pt; no answer. Unable to LVM requesting a call back for an appt. Two more attempts will be made.     Kenzie Mendoza    [facility]

## 2020-05-26 NOTE — TELEPHONE ENCOUNTER
Responded to patient via [employer].    Routed to [facility] and [facility].  Marianna Layne, EDYTAN, RN, PHN

## 2020-05-26 NOTE — TELEPHONE ENCOUNTER
"Pt called to report she does not have a voice today. \"it is driving me crazy\" Something feels funny in her chest also. \"I feel like there is something in there\". Had thrush, a few days ago she swallowed the medication and started to have a stomach ache, also had diarrhea. Throat looks red, small red spots in the throat. She states her tongue looks a little bit better from the pictures she sent Friday.    She is wondering if there is a different medication or can she just swish the Nystatin and spit it out since it is causing her stomach to hurt. If she is to continue to use the Nystatin she will need a refill.    Claudine Jimenez, MSN, RN    "

## 2020-05-26 NOTE — TELEPHONE ENCOUNTER
See other encounter dated 5/26/20 regarding referral and refill requests.    Responded to patient via [employer].    Marianna Layne, BSN, RN, PHN

## 2020-05-26 NOTE — TELEPHONE ENCOUNTER
Patient called clinic. She is still waiting for her Klonopin and Robaxin to be refill. She wants an update.    not applicable (Male)

## 2020-05-26 NOTE — TELEPHONE ENCOUNTER
Its good that she is seeing some improvement. Thrush takes time and I really encourage her to stick with the medication if she can. Certainly her PCP can weigh in on an alternative if she wants to when she talk to her later this week. I know it is not comfortable and it will only continue to get better with adherence to the medication and time. I am out of clinic this week.       NATA Retana CNP  Questions or concerns please feel free to send me a [employer] message or call me  Phone : [phone]

## 2020-05-27 ENCOUNTER — TRANSFERRED RECORDS (OUTPATIENT)
Dept: HEALTH INFORMATION MANAGEMENT | Facility: CLINIC | Age: 30
End: 2020-05-27

## 2020-05-27 ENCOUNTER — TELEPHONE (OUTPATIENT)
Dept: FAMILY MEDICINE | Facility: OTHER | Age: 30
End: 2020-05-27

## 2020-05-27 NOTE — TELEPHONE ENCOUNTER
Medications sent. Notified via MyChart.    Referral was to [facility] for her back. I gave her the information.     Zach Rosario PA-C  [facility]

## 2020-05-27 NOTE — TELEPHONE ENCOUNTER
Spoke to GERMAN Gan at [facility], calling to verify allergy to Robaxin.    Yes, Robaxin is listed on patient's allergy list from 2016, however, she has been taking this. Contacted the clinic yesterday several times requesting a refill.  - Last Robaxin script was picked up 5/7/20, per Malik, pick this up regularly.    [facility] received Percocet prescription today from [facility], Dr. Bert Benitez. Pt was seen there today for the first time.   There's an interaction between Robaxin and Percocet (risk of respiratory depression), so she called [facility] to verify ok. They state patient reported she was allergic to Robaxin, so can't take it. She also told [facility] she takes diazepam, not clonazepam.     Pt picked up Klonopin today, they were out of Robaxin. It is due to be filled tomorrow. Pt was informed of this yesterday and is expected to pick it up tomorrow.    [facility] will contact [facility] and notify them of discrepancy in patient's report of current meds before filling Percocet. She will call clinic back if needing further assistance or notify PCP of resolution to this situation with [facility].    Routed to CDL as TONY Layne, EDYTAN, RN, PHN

## 2020-05-29 ENCOUNTER — TRANSFERRED RECORDS (OUTPATIENT)
Dept: HEALTH INFORMATION MANAGEMENT | Facility: CLINIC | Age: 30
End: 2020-05-29

## 2020-05-29 ENCOUNTER — MYC MEDICAL ADVICE (OUTPATIENT)
Dept: FAMILY MEDICINE | Facility: OTHER | Age: 30
End: 2020-05-29

## 2020-05-29 ENCOUNTER — TELEPHONE (OUTPATIENT)
Dept: FAMILY MEDICINE | Facility: OTHER | Age: 30
End: 2020-05-29

## 2020-05-29 NOTE — TELEPHONE ENCOUNTER
Spoke with RN.   Agree with recommendations.   If her pain is new due to falls and it is so severe that oxycodone she was given from [facility] is not helping then I recommend she have evaluation in clinic to make sure that the new trauma didn't cause other more serious injuries.  Unfortunately our in clinic visits are full for today therefore recommend UC.      Would advise patient if she calls back that [facility] has taken over for pain management and they need to be ones to make adjustments to her regimen.  Seeking pain management from others other than [facility] could result in termination if patient has a contact with them depending on the verbage of the contract.      At this time PCP is no longer managing her chronic pain, again as noted above [facility] will be managing her pain.      Would not advise telephone or video visit to assess patient properly and would not recommend fentanyl patches for her acute pain or alternative opioids especially since she just received oxycodone.     LINETTE SeoC

## 2020-05-29 NOTE — TELEPHONE ENCOUNTER
"Reason for call:  Patient reporting a symptom    Symptom or request: Fell - bruises / pain    Duration (how long have symptoms been present): 1 - 2 days    Have you been treated for this before? No    Additional comments: Patient refuses virtual appointment and states providers will not see her in person. She is \"fed up with it\" . Insisted on speaking with nurse.     Phone Number patient can be reached at:  Home number on file [phone] (home)    Best Time:  any    Can we leave a detailed message on this number:  YES    Call taken on 5/29/2020 at 2:08 PM by Souleymane Garcia    "

## 2020-05-29 NOTE — TELEPHONE ENCOUNTER
Spoke to patient through . States patient is visibly crying. She states she 'just cannot take it anymore.'    'only 36 oxycodone' were given to her by [facility], which was suppose to last her 1 month.   - recommended she call [facility] to discuss this with them today  - She does not like these pills  - Was vomiting all night from them  - it is not helping the pain she has now on her shins after falling last night  - reminded her that oxycodone was not prescribed by [facility] for her fall injury pain, she verbalizes understanding  - Wants to restart Fentanyl patches  - again, recommended she call [facility] to discuss this    'I fell several times in the dark last night.'  - see mychart encounter from today with photos of bruises and abrasion on BLEs  - pt demands a virtual visit today to discuss her wish to change pain medications AND for her fall pain.   - She is not letting writer ask triage questions about her injuries.    Her PCP is not in clinic today. Offered triage and home care advice/disposition recommendations. She yelled 'no!, I want to talk to a doctor today!'   Explained that she should probably be seen in urgent care setting today for her leg pain if she feels she may have serious injuries. She stated 'I refuse to go to urgent care.' and the call was disconnected.    Huddled with GEORGINA.  Marianna Layne, EDYTAN, RN, PHN

## 2020-06-01 ENCOUNTER — VIRTUAL VISIT (OUTPATIENT)
Dept: FAMILY MEDICINE | Facility: OTHER | Age: 30
End: 2020-06-01
Payer: MEDICARE

## 2020-06-01 ENCOUNTER — TRANSFERRED RECORDS (OUTPATIENT)
Dept: HEALTH INFORMATION MANAGEMENT | Facility: CLINIC | Age: 30
End: 2020-06-01

## 2020-06-01 ENCOUNTER — TELEPHONE (OUTPATIENT)
Facility: CLINIC | Age: 30
End: 2020-06-01

## 2020-06-01 DIAGNOSIS — F43.23 ADJUSTMENT DISORDER WITH MIXED ANXIETY AND DEPRESSED MOOD: ICD-10-CM

## 2020-06-01 DIAGNOSIS — F31.63 BIPOLAR DISORDER, CURRENT EPISODE MIXED, SEVERE, WITHOUT PSYCHOTIC FEATURES (H): ICD-10-CM

## 2020-06-01 DIAGNOSIS — F41.9 ANXIETY: ICD-10-CM

## 2020-06-01 DIAGNOSIS — F32.0 MILD MAJOR DEPRESSION (H): ICD-10-CM

## 2020-06-01 DIAGNOSIS — I10 HYPERTENSION GOAL BP (BLOOD PRESSURE) < 130/80: ICD-10-CM

## 2020-06-01 PROCEDURE — 99442 ZZC PHYSICIAN TELEPHONE EVALUATION 11-20 MIN: CPT | Performed by: PHYSICIAN ASSISTANT

## 2020-06-01 RX ORDER — LOSARTAN POTASSIUM 100 MG/1
100 TABLET ORAL DAILY
Qty: 90 TABLET | Refills: 3 | Status: SHIPPED | OUTPATIENT
Start: 2020-06-01 | End: 2021-01-19

## 2020-06-01 RX ORDER — BUPROPION HYDROCHLORIDE 150 MG/1
150 TABLET ORAL EVERY MORNING
Qty: 90 TABLET | Refills: 1 | Status: SHIPPED | OUTPATIENT
Start: 2020-06-01 | End: 2020-09-22

## 2020-06-01 RX ORDER — ARIPIPRAZOLE 30 MG/1
30 TABLET ORAL AT BEDTIME
Qty: 30 TABLET | Refills: 1 | Status: SHIPPED | OUTPATIENT
Start: 2020-06-01 | End: 2021-07-26

## 2020-06-01 RX ORDER — BUPRENORPHINE 10 UG/H
1 PATCH TRANSDERMAL
COMMUNITY
End: 2020-10-06

## 2020-06-01 NOTE — PATIENT INSTRUCTIONS
- Start with Losartan - take 1/2 tablet 50 mg, after 4-5 days if blood pressure is still >130/80 then increase to the full tablet     - Start checking blood pressures - keep a log of blood pressures and pulses     - Appointment Wednesday, June 10th at 1 pm     - Increase Abilify to 30 mg (2 of what you have at home, then new prescription will be in 1 tablet)        20-May-2018 19:43

## 2020-06-01 NOTE — PROGRESS NOTES
Viewed patients ear for her virtual visit today. Ears both appeared to have clear fluid without any signs of infection. Recommendations given to calos Howell who is doing virtual visit today.       NATA Retana CNP  Questions or concerns please feel free to send me a Divshot message or call me  Phone : [phone]

## 2020-06-01 NOTE — PROGRESS NOTES
"Katy Islas is a 30 year old female who is being evaluated via a billable telephone visit.      The patient has been notified of following:     \"This telephone visit will be conducted via a call between you and your physician/provider. We have found that certain health care needs can be provided without the need for a physical exam.  This service lets us provide the care you need with a short phone conversation.  If a prescription is necessary we can send it directly to your pharmacy.  If lab work is needed we can place an order for that and you can then stop by our lab to have the test done at a later time.    Telephone visits are billed at different rates depending on your insurance coverage. During this emergency period, for some insurers they may be billed the same as an in-person visit.  Please reach out to your insurance provider with any questions.    If during the course of the call the physician/provider feels a telephone visit is not appropriate, you will not be charged for this service.\"    Patient has given verbal consent for Telephone visit?  Yes    What phone number would you like to be contacted at? [phone]    How would you like to obtain your AVS? Billie Johnson     Katy Islas is a 30 year old female who presents via phone visit today for the following health issues:    HPI     Hypertension Follow-up      Do you check your blood pressure regularly outside of the clinic? Yes Blood pressure was 160/106 at visit at pain clinic this morning  - BP cuff doesn't work at home   - Stable for awhile   - Stopped blood pressure medication because was going low   - Legs swollen for about a week   - Thinks needs to restart Losartan and Metoprolol       Are you following a low salt diet? No    Are your blood pressures ever more than 140 on the top number (systolic) OR more   than 90 on the bottom number (diastolic), for example 140/90? Yes      How many servings of fruits and vegetables do " you eat daily?  0-1    On average, how many sweetened beverages do you drink each day (Examples: soda, juice, sweet tea, etc.  Do NOT count diet or artificially sweetened beverages)?   0    How many days per week do you exercise enough to make your heart beat faster? 3 or less    How many minutes a day do you exercise enough to make your heart beat faster? 9 or less    How many days per week do you miss taking your medication? 0    Concern - Leg and feet swelling  Onset: Last week    Description:   Swollen leg and feet    Intensity: mild    Progression of Symptoms:  worsening    Accompanying Signs & Symptoms:  Swelling, feet are always in pain, feels like shoes don't fit.    Previous history of similar problem:   None    Precipitating factors:   Worsened by:     Alleviating factors:  Improved by:   Therapies Tried and outcome: Elevating feet - helps a little with swelling.      - Sad, weepy, angry  - Not able to see psychiatrist   - Raped a few months ago   - Every Tuesday at 2:30 pm   - Patch from pain doctor   - Wants to stop Klonopin          Reviewed and updated as needed this visit by Provider  Allergies  Meds  Problems  Med Hx  Surg Hx         Review of Systems   Constitutional, HEENT, cardiovascular, pulmonary, gi and gu systems are negative, except as otherwise noted.       Objective   Reported vitals:  There were no vitals taken for this visit.   healthy, alert and no distress  PSYCH: Alert and oriented times 3; coherent speech, normal   rate and volume, able to articulate logical thoughts, able   to abstract reason, no tangential thoughts, no hallucinations   or delusions  Her affect is normal  RESP: No cough, no audible wheezing, able to talk in full sentences  Remainder of exam unable to be completed due to telephone visits    Diagnostic Test Results:  Labs reviewed in Epic  none         Assessment/Plan:    ICD-10-CM    1. Hypertension goal BP (blood pressure) < 130/80  I10 losartan (COZAAR) 100 MG  tablet   2. Adjustment disorder with mixed anxiety and depressed mood  F43.23 ARIPiprazole (ABILIFY) 30 MG tablet     buPROPion (WELLBUTRIN XL) 150 MG 24 hr tablet   3. Anxiety  F41.9 ARIPiprazole (ABILIFY) 30 MG tablet   4. Mild major depression (H)  F32.0 buPROPion (WELLBUTRIN XL) 150 MG 24 hr tablet   5. Bipolar disorder, current episode mixed, severe, without psychotic features (H)  F31.63 buPROPion (WELLBUTRIN XL) 150 MG 24 hr tablet     1. Blood pressure   - Very high, stopped Losartan 100 mg and Metoprolol due to low blood pressures reportedly when on pain medications   - Also some edema in legs   - Plan      - Start with Losartan - take 1/2 tablet 50 mg, after 4-5 days if blood pressure is still >130/80 then increase to the full tablet   - Start checking blood pressures - keep a log of blood pressures and pulses     - We will check in short term - 9 days, to see response   - For now will wait on starting Metoprolol   - Elevate legs as able and compression socks     2. Mood   - Worsened   - Not able to see psychiatry   - Weekly counseling Tuesdays at 2:30 pm   - Discussed increasing Abilify vs. Wellbutrin   - [place] decision to increase Abilify from 15 mg to 30 mg, reviewed use and side effects  - Continue Wellbutrin  - Agree with plan to stop Klonopin due to pain clinic starting Butrans patch, I was going to provide taper plan but she states no withdrawal and already stopped it    - Recheck as above     The patient indicates understanding of these issues and agrees with the plan.    Return in about 9 days (around 6/10/2020) for Recheck.     Due to language barrier, an  was present during the history-taking and subsequent discussion with this patient.      Phone call duration:  19 minutes and 30 seconds     Zach Rosario PA-C  [facility]

## 2020-06-02 ENCOUNTER — TRANSFERRED RECORDS (OUTPATIENT)
Dept: HEALTH INFORMATION MANAGEMENT | Facility: CLINIC | Age: 30
End: 2020-06-02

## 2020-06-02 ENCOUNTER — TELEPHONE (OUTPATIENT)
Dept: FAMILY MEDICINE | Facility: OTHER | Age: 30
End: 2020-06-02

## 2020-06-02 LAB
ALT SERPL-CCNC: 42 U/L (ref 8–45)
AST SERPL-CCNC: 32 U/L (ref 5–41)
CREAT SERPL-MCNC: 0.87 MG/DL (ref 0.57–1.11)
GFR SERPL CREATININE-BSD FRML MDRD: >60 ML/MIN/1.73M2
GLUCOSE SERPL-MCNC: 75 MG/DL (ref 70–100)
POTASSIUM SERPL-SCNC: 3.7 MMOL/L (ref 3.5–5.1)

## 2020-06-02 NOTE — TELEPHONE ENCOUNTER
"I spoke with the patient who states she can not stop crying, she is upset and overwhelmed. \"I can't do this anymore. She had to put her horse down, Mocha. I think reality has really hit. I miss him so much, I can't stop crying. I need help, please. I have a counseling appointment at 230 today. I want to go to the hospital to get some help but I don't know who would take care of my pets.\" She has a dog and 2 cats. States her mom lives in [place]. States she might have a friend who can watch her pets. \"Did not sleep last night, so much going on\". Gave her the number to [facility], she will call them to make sure the highways are open and she is able to go there. When I asked her if she has any plans of hurting herself she said \"I almost overdosed\". She states she needs a hug. She likes the plan of going to [facility] and states she does not have a plan to harm herself today. Also gave her the crisis hotline number. She also stated she will reach out to her god mother for help too. No other questions or concerns. She promises to not harm herself and to go to the hospital today. She thanked me for the support and I encouraged her to callback if anything changes.    Claudine Jimenez, MSN, RN    "

## 2020-06-02 NOTE — TELEPHONE ENCOUNTER
Writer attempted to reach pt; no answer. LVM requesting a call back for an appt. One more attempt will be made.     Kenzie Mendoza    [facility]

## 2020-06-03 ENCOUNTER — OFFICE VISIT (OUTPATIENT)
Dept: FAMILY MEDICINE | Facility: OTHER | Age: 30
End: 2020-06-03
Payer: MEDICARE

## 2020-06-03 ENCOUNTER — MYC MEDICAL ADVICE (OUTPATIENT)
Dept: FAMILY MEDICINE | Facility: OTHER | Age: 30
End: 2020-06-03

## 2020-06-03 ENCOUNTER — TELEPHONE (OUTPATIENT)
Dept: FAMILY MEDICINE | Facility: OTHER | Age: 30
End: 2020-06-03

## 2020-06-03 VITALS
HEART RATE: 100 BPM | DIASTOLIC BLOOD PRESSURE: 84 MMHG | RESPIRATION RATE: 18 BRPM | TEMPERATURE: 97.5 F | SYSTOLIC BLOOD PRESSURE: 116 MMHG

## 2020-06-03 DIAGNOSIS — M25.561 CHRONIC PAIN OF RIGHT KNEE: ICD-10-CM

## 2020-06-03 DIAGNOSIS — S80.10XD CONTUSION OF LOWER LEG, UNSPECIFIED LATERALITY, SUBSEQUENT ENCOUNTER: ICD-10-CM

## 2020-06-03 DIAGNOSIS — G43.009 MIGRAINE WITHOUT AURA AND WITHOUT STATUS MIGRAINOSUS, NOT INTRACTABLE: ICD-10-CM

## 2020-06-03 DIAGNOSIS — M79.605 BILATERAL LOWER EXTREMITY PAIN: ICD-10-CM

## 2020-06-03 DIAGNOSIS — M79.89 LEG SWELLING: ICD-10-CM

## 2020-06-03 DIAGNOSIS — R11.0 NAUSEA: Primary | ICD-10-CM

## 2020-06-03 DIAGNOSIS — G89.29 CHRONIC PAIN OF RIGHT KNEE: ICD-10-CM

## 2020-06-03 DIAGNOSIS — M79.604 BILATERAL LOWER EXTREMITY PAIN: ICD-10-CM

## 2020-06-03 DIAGNOSIS — S40.022D CONTUSION OF LEFT UPPER ARM, SUBSEQUENT ENCOUNTER: ICD-10-CM

## 2020-06-03 DIAGNOSIS — R53.83 FATIGUE, UNSPECIFIED TYPE: ICD-10-CM

## 2020-06-03 PROCEDURE — 99214 OFFICE O/P EST MOD 30 MIN: CPT | Performed by: PHYSICIAN ASSISTANT

## 2020-06-03 ASSESSMENT — PAIN SCALES - GENERAL: PAINLEVEL: SEVERE PAIN (6)

## 2020-06-03 NOTE — PROGRESS NOTES
"Subjective     Katy Islas is a 30 year old female who presents to clinic today for the following health issues:    HPI   ED/UC Followup:    Facility:  [facility]  Date of visit: 06/02/20  Reason for visit: GI upset, Headache, pain and swelling in both legs/Feet  Current Status: Patient reports she is feeling about the same as she was. Currently has a buprenorphine patch on 10 mcg/ hour        - \"not feeling well at all\" - Has a migraine since yesterday and went to the ED.  - She was at a friends house at [place], she was there and stomach started to give her really bad pain and then she tried to eat.   - Feet and legs are swelling.  Right knee hurts a lot, there is a lot of bruising there.   - Nauseated.   - Gave her a patch, not sure if that is what causing her to not feel well.   - Last night couldn't even walk.      Leg Swelling:  - was present when she saw CDL< BP elevated and restated on BP meds, swelling went away.   - She then had swelling that recurred yesterday afternoon before she went to the ED. Located in both legs.   - Morning swelling is not better, but laying down swelling improves some.   - Swelling from at least the knee down, can't really tell.  Both sides.   - Feels it in the feet even.     Leg pain:  - Has been going on for about 2 weeks.   - Leg pain not sure if it started before after the swelling.   - Right leg hurts more than the left.  The most pain is located in the knee.  Hurts to walk.   - Fell out of her bed last week.  Nothing happened right after. Not sure when leg pain started but it was after that.   - Describes pain - it's like a cramping and twisting pain when she walks mainly.   - yes numbness/tingling   - Has had bruising on lower extremities - no known reason why, noticed it probably soon after she fell out of the bed.   - No redness she has seen in the lower legs.   - Has been dealing with knee pain for quite a while, saw Dr. Maldonado in the past, was supposed to " "have MRI but then that was on halt due to COVID19.  So she hasn't had MRI.  Had injection in the right didn't help, had it on the left and that did help.   - She is uncertain how she got bruises on arms and legs.     Migraine:  - started yesterday.   - Takes maxalt today.  Just makes her sleepy.  Helps minimally.   - Yesterday pain 10/10; today pain 4-5/10.   - located on right above both eyes. Sensitivity to light, no vision changes.  Yesterday had numbness/tingling over her entire body, less today.  Has been nauseated.  No vomiting.  Had migraine prior to the nausea.     [facility] put the buprenorphine patch on her, was not at the ED. She had it placed on Monday.  They tried oxycodone - just made her vomit which is why they tried the patch.  She isn't sure if this is what is causing he \"gross feeling\". But has been feeling this \"'gross feeling\" since they applied the patch.     Abdominal pain - no piercing pain, just nauseated. The pain is good, nausea is bad. Was also having diarrhea yesterday.  No diarrhea today.  No bowel movements today so far.  Not urinating much, yesterday tried to drink as much as she could but not today because it makes her feel like she is going to throw up.     Patient Active Problem List   Diagnosis     Hearing loss     Allergic rhinitis     Migraine     Benign neoplasm of skin of lower limb, including hip     Dysmenorrhea     Crohn's colitis (H)     Mild major depression (H)     Anxiety     Chronic pain     Bipolar disorder (H)     Marijuana abuse     Adjustment disorder with mixed anxiety and depressed mood     Herpes simplex virus infection     Gastroesophageal reflux disease without esophagitis     Bee sting allergy     Mild persistent asthma without complication     Chronic bilateral low back pain with left-sided sciatica     Vitamin D deficiency     Atypical mole     Lumbar disc disease with radiculopathy     S/P lumbar fusion     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) "     Upper GI bleed - suspected     Tobacco use disorder     History of pseudoseizure     Iliotibial band syndrome affecting lower leg, right     Chondromalacia of right patellofemoral joint     Melanocytic nevus, unspecified location     Dysplastic skin lesion     Patellar maltracking, right     Right anterior knee pain     Hypophosphatemia     Class 1 obesity due to excess calories without serious comorbidity with body mass index (BMI) of 32.0 to 32.9 in adult     Mild intermittent asthma     Cervicalgia     Bulge of cervical disc without myelopathy     Hypertension goal BP (blood pressure) < 130/80     Anal fissure     Low hematocrit     Elevated d-dimer     Abnormal uterine bleeding     Morbid obesity (H)     Abdominal pain     Rectal pain     Abscess of anal and rectal regions     Past Surgical History:   Procedure Laterality Date     AS REMOVAL OF COCCYX  10/18/2017     C FUSION OF SACROILIAC JOINT  10/26/2018     COLONOSCOPY  7/1/2009    [facility].     COLONOSCOPY N/A 7/17/2019    Procedure: Colonoscopy, With Polypectomy And Biopsy;  Surgeon: Edwin Conde MD;  Location: [facility] OR     COLONOSCOPY WITH CO2 INSUFFLATION N/A 7/17/2019    Procedure: COLONOSCOPY, WITH CO2 INSUFFLATION;  Surgeon: Edwin Conde MD;  Location: [facility] OR     COMBINED ESOPHAGOSCOPY, GASTROSCOPY, DUODENOSCOPY (EGD) WITH CO2 INSUFFLATION N/A 4/12/2019    Procedure: COMBINED ESOPHAGOSCOPY, GASTROSCOPY, DUODENOSCOPY (EGD) WITH CO2 INSUFFLATION;  Surgeon: Edwin Conde MD;  Location: [facility] OR     ESOPHAGOSCOPY, GASTROSCOPY, DUODENOSCOPY (EGD), COMBINED N/A 4/12/2019    Procedure: Combined Esophagoscopy, Gastroscopy, Duodenoscopy (Egd), Biopsy Single Or Multiple;  Surgeon: Edwin Conde MD;  Location: [facility] OR     FISTULOTOMY RECTUM N/A 4/30/2020    Procedure: EXAM UNDER ANESTHESIA, ANUS, parital fistulotomy;  Surgeon: Valentin Sanchez MD;  Location: [facility]     FUSION  SPINE POSTERIOR MINIMALLY INVASIVE ONE LEVEL N/A 2/23/2017    Procedure: FUSION SPINE POSTERIOR MINIMALLY INVASIVE ONE LEVEL;  L4-5 Oblique Lateral Lumbar Interbody Fusion   Epidural steroid injection.   Transpedicular Bone marrow aspiration;  Surgeon: Jeniffer Eugene MD;  Location: [facility] OR     HC COLONOSCOPY THRU STOMA WITH BIOPSY  10/29/2003    Impression is that of normal appearing colonoscopy, without evidence of rectal bleeding.     HC COLONOSCOPY THRU STOMA, DIAGNOSTIC  10/00    normal     HC COLONOSCOPY THRU STOMA, DIAGNOSTIC  Oct 2009    Dr López- normal     HC EEG AWAKE AND SLEEP      abnormal     HC MRI BRAIN W/O CONTRAST  12/00    normal     HC REMOVAL GALLBLADDER  8/5/2009    [facility], [facility].     HC UGI ENDOSCOPY DIAG W BIOPSY  11/11/09    Normal esophagus     HC UGI ENDOSCOPY, SIMPLE EXAM  7/00, 10/00    mild chronic esophagitis and duodenitis, neg H pylori     HC UGI ENDOSCOPY, SIMPLE EXAM  01/20/2005    Esophagogastroduodenoscopy, colonoscopy with biopsies.  [facility] UGI ENDOSCOPY, SIMPLE EXAM  7/1/2009    [facility], [facility].      UGI ENDOSCOPY, SIMPLE EXAM  11/11/2009    attempted upper GI, pt. could not tolerate procedure:[facility]     ORTHOPEDIC SURGERY  October 19,2011    diskectomy L4-L5       Social History     Tobacco Use     Smoking status: Current Every Day Smoker     Packs/day: 0.25     Years: 5.00     Pack years: 1.25     Types: Cigarettes     Smokeless tobacco: Never Used   Substance Use Topics     Alcohol use: Not Currently     Comment: Not since last July 2018     Family History   Problem Relation Age of Onset     Gastrointestinal Disease Brother         severe Crohn's     Neurologic Disorder Brother         Seizures post head injury     Depression Brother      Substance Abuse Brother      Genitourinary Problems Father         kidney stones     Diabetes Father      Heart Disease Father         Open heart surgery      Breast Cancer Maternal Grandmother      Parkinsonism Maternal Grandmother      Cerebrovascular Disease Paternal Grandmother      Cancer Maternal Grandfather         Lung     Cardiovascular Paternal Grandfather         Heart Attack     Substance Abuse Mother         in recovery x1 year      Lung Cancer Paternal Uncle      Cancer Paternal Uncle      Lung Cancer Maternal Uncle          Current Outpatient Medications   Medication Sig Dispense Refill     acetaminophen (TYLENOL) 500 MG tablet Take 2 tablets (1,000 mg) by mouth 3 times daily as needed for mild pain 100 tablet 3     albuterol (PROAIR HFA/PROVENTIL HFA/VENTOLIN HFA) 108 (90 Base) MCG/ACT inhaler Inhale 2 puffs into the lungs every 6 hours as needed for shortness of breath / dyspnea or wheezing 3 Inhaler 3     albuterol (PROVENTIL) (2.5 MG/3ML) 0.083% neb solution Take 1 vial (2.5 mg) by nebulization every 6 hours as needed for shortness of breath / dyspnea or wheezing 50 vial 11     alum & mag hydroxide-simethicone (MAALOX ADVANCED MAX ST) 400-400-40 MG/5ML SUSP suspension Take 15 mLs by mouth every 4 hours as needed for indigestion (mix with lidocaine) 355 mL 11     ARIPiprazole (ABILIFY) 30 MG tablet Take 1 tablet (30 mg) by mouth At Bedtime 30 tablet 1     aspirin (ASA) 81 MG tablet Take 1 tablet (81 mg) by mouth daily 90 tablet 3     azelastine (OPTIVAR) 0.05 % ophthalmic solution Place 1 drop into both eyes 2 times daily 6 mL 11     buprenorphine (BUTRANS) 10 MCG/HR WK patch Place 1 patch onto the skin every 7 days       buPROPion (WELLBUTRIN XL) 150 MG 24 hr tablet Take 1 tablet (150 mg) by mouth every morning 90 tablet 1     busPIRone (BUSPAR) 15 MG tablet Take 1 tablet (15 mg) by mouth 2 times daily 60 tablet 5     cyclobenzaprine (FLEXERIL) 10 MG tablet Take 1 tablet (10 mg) by mouth 3 times daily as needed for muscle spasms or other (back pain) 90 tablet 3     diphenhydrAMINE (BENADRYL) 50 MG capsule Take 1-2 capsules ( mg) by mouth every 6  hours as needed for itching, allergies or sleep 90 capsule 3     DULoxetine (CYMBALTA) 60 MG EC capsule Take 1 capsule (60 mg) by mouth daily 90 capsule 3     EPINEPHrine (ANY BX GENERIC EQUIV) 0.3 MG/0.3ML injection 2-pack Inject 0.3 mLs (0.3 mg) into the muscle once as needed for anaphylaxis 0.6 mL 3     fexofenadine (ALLEGRA) 180 MG tablet Take 1 tablet (180 mg) by mouth daily 90 tablet 3     fluticasone (FLONASE) 50 MCG/ACT nasal spray Spray 1 spray into both nostrils daily 16 g 1     gabapentin (NEURONTIN) 300 MG capsule Take 1 capsule (300 mg) by mouth 3 times daily 270 capsule 3     lidocaine (XYLOCAINE) 2 % solution Swish and swallow 15 mLs in mouth every 4 hours as needed for other (GERD) ; Max 8 doses/24 hour period. Mix with 15 mLs Maalox or Mylanta. 100 mL 3     losartan (COZAAR) 100 MG tablet Take 1 tablet (100 mg) by mouth daily 90 tablet 3     medical cannabis (Patient's own supply) (The purpose of this order is to document that the patient reports taking medical cannabis.  This is not a prescription, and is not used to certify that the patient has a qualifying medical condition.) 0 Information only 0     methocarbamol (ROBAXIN) 750 MG tablet Take 1 tablet (750 mg) by mouth 3 times daily as needed for muscle spasms 60 tablet 3     metoprolol succinate ER (TOPROL-XL) 200 MG 24 hr tablet Take 1 tablet (200 mg) by mouth daily 90 tablet 1     NONFORMULARY Apply 30 mLs topically 4 times daily       nystatin (MYCOSTATIN) 928366 UNIT/ML suspension Take 5 mLs (500,000 Units) by mouth 4 times daily for 14 days swish in the mouth and retain for as long as possible before swallowing. 280 mL 0     ondansetron (ZOFRAN-ODT) 8 MG ODT tab Take 1 tablet (8 mg) by mouth every 8 hours as needed for nausea 15 tablet 0     pantoprazole (PROTONIX) 40 MG EC tablet Take 1 tablet (40 mg) by mouth 2 times daily Take 30-60 minutes before a meal. 180 tablet 3     polyethylene glycol (MIRALAX) 17 g packet Take 17 g by mouth 2  times daily 60 packet 0     prazosin (MINIPRESS) 1 MG capsule Take 1 mg by mouth At Bedtime  5     prochlorperazine (COMPAZINE) 10 MG tablet Take 1 tablet (10 mg) by mouth every 6 hours as needed for nausea or vomiting 30 tablet 0     rizatriptan (MAXALT-MLT) 5 MG ODT Take 1-2 tablets (5-10 mg) by mouth at onset of headache for migraine May repeat in 2 hours. Max 6 tablets/24 hours. 18 tablet 3     valACYclovir (VALTREX) 1000 mg tablet 2 tabs at onset of mouth sores.  Repeat dose in 12 hours.       HYDROmorphone (DILAUDID) 2 MG tablet Take 1 tablet (2 mg) by mouth every 4 hours as needed for moderate to severe pain (Patient not taking: Reported on 5/18/2020) 12 tablet 0     medroxyPROGESTERone (DEPO-PROVERA) 150 MG/ML IM injection Inject 1 mL (150 mg) into the muscle every 3 months (Patient not taking: Reported on 6/3/2020) 3 mL 3     oxyCODONE-acetaminophen (PERCOCET) 5-325 MG tablet Take 1-2 tablets by mouth every 4 hours as needed for pain (Patient not taking: Reported on 5/18/2020) 12 tablet 0     sucralfate (CARAFATE) 1 GM tablet Take 1 tablet (1 g) by mouth 4 times daily as needed (heartburn) (Patient not taking: Reported on 6/1/2020) 360 tablet 3     BP Readings from Last 3 Encounters:   06/03/20 116/84   05/13/20 124/83   05/02/20 114/63    Wt Readings from Last 3 Encounters:   05/13/20 86.2 kg (190 lb)   05/04/20 86.9 kg (191 lb 9.3 oz)   05/02/20 86.9 kg (191 lb 9.3 oz)                    Reviewed and updated as needed this visit by Provider  Tobacco  Allergies  Meds  Problems  Med Hx  Surg Hx  Fam Hx         Review of Systems   Constitutional, HEENT, cardiovascular, pulmonary, GI, , musculoskeletal, neuro, skin systems are negative, except as otherwise noted.      Objective    /84   Pulse 100   Temp 97.5  F (36.4  C) (Temporal)   Resp 18   There is no height or weight on file to calculate BMI.  Physical Exam   GENERAL: alert and fatigued  EYES: Eyes grossly normal to inspection, PERRL  and conjunctivae and sclerae normal  RESP: lungs clear to auscultation - no rales, rhonchi or wheezes  CV: regular rate and rhythm, normal S1 S2, no S3 or S4, no murmur, click or rub, no peripheral edema and peripheral pulses strong  ABDOMEN: soft, nontender, no hepatosplenomegaly, no masses and bowel sounds normal  MS: normal muscle tone and significant edema noted in bilateral lower extremities ankles are well defined as well as both feet, possibly mild edema or effusions of bilateral knees R>L.  Significant tenderness to palpation over both lower extremities R>L from the knee to the ankle mainly medially lower legs.  Very tender to any palpation over the right knee joint medial worse than laterally.  Random scattered contusions that are dollar coin sized, no palpable varicosities, no erythema of lower extremities, dorsalis pedis pulses 1+ symmetric, Post tib pulses 2+ symmetric, capillary refill normal bilateral lower extremities. Sensation to light touch in both feet intact  SKIN: ~5 scattered contusions quarter sized bilateral upper extremities.   PSYCH: affect flat, fatigued and appearance disheveled, will not make eye contact with provider.     Diagnostic Test Results:  Labs reviewed in Epic        Assessment & Plan       ICD-10-CM    1. Nausea  R11.0    2. Migraine without aura and without status migrainosus, not intractable  G43.009    3. Chronic pain of right knee  M25.561     G89.29    4. Bilateral lower extremity pain  M79.604     M79.605    5. Leg swelling  M79.89    6. Contusion of left upper arm, subsequent encounter  S40.022D    7. Contusion of lower leg, unspecified laterality, subsequent encounter  S80.10XD    8. Fatigue, unspecified type  R53.83      Katy presents today with many different concerns.   High suspicion that her nausea and overall not feeling well., fatigue is related to her buprenorphine patch that [facility] placed on Monday.  It is likely compounded by the development of a migraine  yesterday which is now resolving today with use of Maxalt.  Patient states toradol does not help with her migraines, neither do muscle relaxers.  Therefore recommended use of Zofran and continued use of maxalt if needed at appropriate intervals.  Reminded of potential side effects of Zofran. And encouraged stool softeners if constipation results.      Abdominal pain appears to have resolved, now just dealing with nausea, diarrhea also resolved.     Lower leg swelling - she notes feeling this, there is no obvious edema present, definitely not pitting edema.  There is some swelling of the knee joint likely due to her chronic knee pain.  Low suspicion for deep venous thrombosis, possibly venous insufficiency, consider ultrasound.  Good arterial supply less concerning for arterial pathology.  BP is now better controlled, her swelling is maybe slightly better with elevation so encouraged to elevate legs when sitting especially since she can't walk right now due to leg and mainly her knee pain.     Knee pain - this is chronic, she was supposed to get MRI done, will schedule her for MRI at this time and follow-up with Dr. Maldonado. Patient will not even allow provider to perform exam due to pain. She notes her pain is the same type of pain just intensified since her fall out of bed.  Potential for increased injury due to fall out of bed.     She will reach out to [facility] regarding the patch and if they can remove it early as it is likely causing her many of her symptoms of fatigue, nausea, not feeling well.  They will continue to manage her chronic pain.     Of Note - MA went in to schedule for imaging and give AVS and per  she got up and walked out, wheelchair was still left in room, MA went to look for her car and it was gone already.  We will MyChart message her to notify her of appointment times.     Return in about 1 week (around 6/10/2020) for recheck with PCP, sooner if changes or new concerns. .     Options  for treatment and follow-up care were reviewed with the patient and/or guardian. Patient and/or guardian engaged in the decision making process and verbalized understanding of the options discussed and agreed with the final plan.    Wilson Ibarra PA-C  [facility]

## 2020-06-03 NOTE — PATIENT INSTRUCTIONS
Take the zofran for the nausea. Remember to take stool softeners if you get constipated on this.     Elevate legs when able.   Ice/heat whichever feels best  Continue use of Maxalt if needed.       Call [facility] to talk about the patch, that it is causing nausea and fatigue and you're not feeling well on it.     Get MRI done and then follow-up with Dr. Maldonado.   Keep follow-up with Zach next week.

## 2020-06-03 NOTE — TELEPHONE ENCOUNTER
Reason for call:  Patient reporting a symptom    Symptom or request: Abdominal pain, swollen feet    Duration (how long have symptoms been present): yesterday    Have you been treated for this before? No    Additional comments: Patient went to ED in [place] and nothing was done, no labs were done. Patient stated pain was so bad she felt like she was going to pass out and feel are swollen. Asking to be triaged.     Phone Number patient can be reached at:  Home number on file [phone] (home)    Best Time:  any    Can we leave a detailed message on this number:  YES    Call taken on 6/3/2020 at 10:25 AM by Genie Luo

## 2020-06-03 NOTE — TELEPHONE ENCOUNTER
Jv sent to patient for upcoming appointments. Patient left clinic prior to being informed of appointments that were scheduled on her behalf.  Fadia Harvey CMA ([facility])

## 2020-06-03 NOTE — TELEPHONE ENCOUNTER
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 The diarrhea has gotten better. Almost passed out from the pain because it hurt so bad. Went to [facility] ED, left upset because they did not do anything. Her right knee and both feet are swollen and then are painful. This morning not feel well. Pt requesting an in person visit today in [place]. Pt has had multiple ED visits recently for various reasons. Denies Fever, cough or SOB.     Claudine Jimenez, MSN, RN

## 2020-06-04 ENCOUNTER — TELEPHONE (OUTPATIENT)
Dept: FAMILY MEDICINE | Facility: OTHER | Age: 30
End: 2020-06-04

## 2020-06-04 NOTE — TELEPHONE ENCOUNTER
Third and final attempt to reach pt; no answer.Unable to LVM requesting a call back for an appt.     Writer is routing this encounter to referring provider. Please inform pt that we tried to reach her to get her scheduled with an [facility]. Please have pt call us back if she is still interested in being seen at [facility]. [phone]. Writer is closing this encounter, no further action needed.     Please contact Anaya Ibarra with questions/concerns  [phone]    Thank you,     Kenzie Mendoza    [facility]

## 2020-06-04 NOTE — TELEPHONE ENCOUNTER
Patient was added to PCP's schedule for today.   LM for patient to return call.   Her concerns can likely wait until the apt next week - if she just wants labs then tell us which ones and CDL will order.    Ines Roy, RN, BSN

## 2020-06-05 ENCOUNTER — NURSE TRIAGE (OUTPATIENT)
Dept: FAMILY MEDICINE | Facility: OTHER | Age: 30
End: 2020-06-05

## 2020-06-05 NOTE — TELEPHONE ENCOUNTER
LOV: 6/3/20 with ES for these symptoms, copied/pasted from ES notes:   'High suspicion that her nausea and overall not feeling well., fatigue is related to her buprenorphine patch that [facility] placed on Monday.  It is likely compounded by the development of a migraine yesterday which is now resolving today with use of Maxalt.  Patient states toradol does not help with her migraines, neither do muscle relaxers.  Therefore recommended use of Zofran and continued use of maxalt if needed at appropriate intervals.  Reminded of potential side effects of Zofran. And encouraged stool softeners if constipation results.      Returned patient's call, migraine for 3 or 4 days, pain in neck and pain radiating down R side of neck, shoulder, down arm all the way to her fingers.  - Denies weakness or confusion or vision changes  - no vomiting or abdominal pain  - States she does have trouble with disks in her back and this pain is similar to chronic pain she has had d/t this.  - Took her buprenorphine 10 mcg patch off on 6/3 and notified [facility] about her symptoms that started after the patch was applied.  - migraine and nausea are improved since removing the patch, but not resolved  - She is scheduled to return to [facility] on 6/8 at 1:45pm to come up with a new plan.  - per pt's request, looked up half life of buprenorphine transdermal, provided this information per [employer]    She is scheduled for a phone visit with her PCP on 6/10.  - Recommended continuing to care for her migraine and nausea per ES' recommendations, as she is improving.   - Push fluids and continue environmental changes that help her migraine (ie: dark room, extra rest)  - Consider contacting [facility] regarding neck and arm pain, as they are managing her chronic pain    She verbalizes agreement with this plan and denies further questions/needs at this time.    Marianna Layne, EDYTAN, RN, PHN    Additional Information    Negative: Drug overdose and nurse unable to  "answer question    Negative: Caller requesting information not related to medicine    Negative: Caller requesting a prescription for Strep throat and has a positive culture result    Negative: Rash while taking a medication or within 3 days of stopping it    Negative: Immunization reaction suspected    Negative: [1] Asthma AND [2] having symptoms of asthma (cough, wheezing, etc)    Negative: MORE THAN A DOUBLE DOSE of a prescription or over-the-counter (OTC) drug    Negative: [1] DOUBLE DOSE (an extra dose or lesser amount) of over-the-counter (OTC) drug AND [2] any symptoms (e.g., dizziness, nausea, pain, sleepiness)    Negative: [1] DOUBLE DOSE (an extra dose or lesser amount) of prescription drug AND [2] any symptoms (e.g., dizziness, nausea, pain, sleepiness)    Negative: Took another person's prescription drug    Negative: [1] DOUBLE DOSE (an extra dose or lesser amount) of prescription drug AND [2] NO symptoms (Exception: a double dose of antibiotics)    Negative: Diabetes drug error or overdose (e.g., insulin or extra dose)    Negative: [1] Request for URGENT new prescription or refill of \"essential\" medication (i.e., likelihood of harm to patient if not taken) AND [2] triager unable to fill per unit policy    Negative: [1] Prescription not at pharmacy AND [2] was prescribed today by PCP    Negative: Pharmacy calling with prescription questions and triager unable to answer question    Negative: Caller has urgent medication question about med that PCP prescribed and triager unable to answer question    Negative: Caller has medication question about med not prescribed by PCP and triager unable to answer question (e.g., compatibility with other med, storage)    Negative: [1] DOUBLE DOSE (an extra dose or lesser amount) of over-the-counter (OTC) drug AND [2] NO symptoms    Negative: [1] DOUBLE DOSE (an extra dose or lesser amount) of antibiotic drug AND [2] NO symptoms    Caller has medication question, adult has " minor symptoms, caller declines triage, and triager answers question    Protocols used: MEDICATION QUESTION CALL-A-AH

## 2020-06-05 NOTE — TELEPHONE ENCOUNTER
Patient called stating she has had a migraine and nauseated since Tuesday and joint pain. Would like to speak to a nurse.    Please advise.

## 2020-06-05 NOTE — PROGRESS NOTES
"Katy Islas is a 30 year old female who is being evaluated via a billable telephone visit.      The patient has been notified of following:     \"This telephone visit will be conducted via a call between you and your physician/provider. We have found that certain health care needs can be provided without the need for a physical exam.  This service lets us provide the care you need with a short phone conversation.  If a prescription is necessary we can send it directly to your pharmacy.  If lab work is needed we can place an order for that and you can then stop by our lab to have the test done at a later time.    Telephone visits are billed at different rates depending on your insurance coverage. During this emergency period, for some insurers they may be billed the same as an in-person visit.  Please reach out to your insurance provider with any questions.    If during the course of the call the physician/provider feels a telephone visit is not appropriate, you will not be charged for this service.\"    Patient has given verbal consent for Telephone visit?  Yes    What phone number would you like to be contacted at? [phone]    How would you like to obtain your AVS? Tashiahart    Subjective     Katy sIlas is a 30 year old female who presents via phone visit today for the following health issues:    HPI     Hypertension Follow-up      Do you check your blood pressure regularly outside of the clinic? Yes     Are you following a low salt diet? Yes    Are your blood pressures ever more than 140 on the top number (systolic) OR more   than 90 on the bottom number (diastolic), for example 140/90? Yes. This is happening daily. Checking 1 daily. 160/108 today yesterday    - Would like a referral for cardiology - wants to do this because her blood pressure gets so high     When stood up a couple times fainted, this was last week   - Appt with Dr. Maldonado tomorrow, she is wondering about her MRI results     - My feet " and knees are painful   - Swelling is going down   - Resting a lot, can't be on feet or happens again   - BPs      160/108, today 144/86, the pain doctor recommended lower dose of the patch      Did increase to full tablet - yesterday      Pulses around 90's, was 94 today   - Drinking a lot of water   - Fistula all healed   - Doesn't want to have surgery during the summer      - Losing weight, tapering off medication - Gabapentin     - 10 AM on Wednesdays         Plan from last visit 6/1/20    1. Blood pressure   - Very high, stopped Losartan 100 mg and Metoprolol due to low blood pressures reportedly when on pain medications   - Also some edema in legs   - Plan      - Start with Losartan - take 1/2 tablet 50 mg, after 4-5 days if blood pressure is still >130/80 then increase to the full tablet   - Start checking blood pressures - keep a log of blood pressures and pulses   - We will check in short term - 9 days, to see response   - For now will wait on starting Metoprolol   - Elevate legs as able and compression socks       BP Readings from Last 3 Encounters:   06/07/20 (!) 149/98   06/03/20 116/84   05/13/20 124/83    Wt Readings from Last 3 Encounters:   05/13/20 86.2 kg (190 lb)   05/04/20 86.9 kg (191 lb 9.3 oz)   05/02/20 86.9 kg (191 lb 9.3 oz)                    Reviewed and updated as needed this visit by Provider  Allergies  Meds  Problems  Med Hx  Surg Hx         Review of Systems   Constitutional, HEENT, cardiovascular, pulmonary, gi and gu systems are negative, except as otherwise noted.       Objective   Reported vitals:  There were no vitals taken for this visit.   healthy, alert and no distress  PSYCH: Alert and oriented times 3; coherent speech, normal   rate and volume, able to articulate logical thoughts, able   to abstract reason, no tangential thoughts, no hallucinations   or delusions  Her affect is normal  RESP: No cough, no audible wheezing, able to talk in full sentences  Remainder of  exam unable to be completed due to telephone visits    Diagnostic Test Results:  Labs reviewed in Epic  none         Assessment/Plan:    ICD-10-CM    1. Hypertension goal BP (blood pressure) < 130/80  I10 CARDIO  ADULT REFERRAL   2. Chronic pain syndrome  G89.4    3. Dysmenorrhea  N94.6    4. Abscess of anal and rectal regions  K61.2    5. Bipolar disorder, current episode mixed, severe, without psychotic features (H)  F31.63    6. Crohn's disease of colon with fistula (H)  K50.113    7. Bilateral hearing loss, unspecified hearing loss type  H91.93        1. Blood pressure   - Improved after re-starting Losartan at 50 mg, just increased yesterday to 100 mg and BP today was even more improved at 144/86   - Recommend she continue at Losartan 100 mg, expect BPs to continue to improve   - Will hold on restarting Metoprolol for now   - Reviewed medication use and side effects  - Continue to monitor BP   - Recheck 2 weeks - plan next visit to discuss lab update and BP check in clinic   - Discussed monitoring of what sounds like orthostatic hypotension as happened right when started Losartan, due to rapid decrease of high BPs, continue to monitor this   - Discussed warning signs that would warrant return to clinic/ED  - Likely will no longer be an issue   - Discussed I do not think she needs a cardiology appointment but she was insistent on this, discussed could wait as well, patient still insistent, referral placed     2. Pain   - Advised that I can not advised her on MRI of her knee, defer that to her orthopedic doctor that she will see tomorrow   - Now well established with [facility], I will no longer discuss her back pain with her or prescribe pain medications     3. GI   - Continue to follow with specialist   - Fistula is now completely healed     4. Mood   - Continue plan to establish with psychiatrist and continue weekly counseling     5. Dysmenorrhea   -  Was going to have a hysterectomy but this was  postponed due to COVID pandemic, then postponed due to recent surgery for fistula   - Patient now wanting to wait on this, encouraged her to focus on only a     FOCUS  - Discussed need for a focus on specific areas of her health as it is overwhelming for healthcare and for her as she often misses appointments   - For now: Blood pressure, mental health, back and knee pain      On Hold: Dysmenorrhea, Crohn's     Long discussion with patient today regarding her multiple appointments in 1 week and repeated Mychart/phone calls in one day. We have made the following plan     Standing 2 week appointments with [facility] - Wednesdays at 10 am. She is not to see any other FP providers or schedule with me at other times. She is going to get a notebook to write down all her concerns for us to go over every 2 weeks (unless there is an emergency of course). Mychart and phone messages will be covered by Claudine TAMAYO as her point person RN. If she is not in, other RNs can address if emergent or route right to [facility].     I reinforced to her appropriate use of healthcare and emphasized that we are doing this so that everyone knows the plan and is on the same page to provide her with the best care. I discussed that would be best obtained with her having only 1 provider in family practice, 1 RN, and administration aware of her care.     Due to language barrier, an ALS  was present on the phone with this patient.    The patient indicates understanding of these issues and agrees with the plan.    Return in about 2 weeks (around 6/24/2020).    Phone call duration:  17 minutes    Zach Rosario PA-C  [facility]

## 2020-06-07 ENCOUNTER — NURSE TRIAGE (OUTPATIENT)
Dept: NURSING | Facility: CLINIC | Age: 30
End: 2020-06-07

## 2020-06-07 ENCOUNTER — HOSPITAL ENCOUNTER (EMERGENCY)
Facility: CLINIC | Age: 30
Discharge: LEFT BEFORE DISCHARGE | End: 2020-06-07
Attending: NURSE PRACTITIONER | Admitting: NURSE PRACTITIONER
Payer: MEDICARE

## 2020-06-07 VITALS
RESPIRATION RATE: 18 BRPM | OXYGEN SATURATION: 97 % | SYSTOLIC BLOOD PRESSURE: 149 MMHG | TEMPERATURE: 99.2 F | DIASTOLIC BLOOD PRESSURE: 98 MMHG

## 2020-06-07 DIAGNOSIS — G89.29 CHRONIC PAIN: ICD-10-CM

## 2020-06-07 DIAGNOSIS — G44.209 TENSION HEADACHE: ICD-10-CM

## 2020-06-07 DIAGNOSIS — R53.83 FATIGUE: ICD-10-CM

## 2020-06-07 LAB
ALBUMIN UR-MCNC: NEGATIVE MG/DL
AMPHETAMINES UR QL: NOT DETECTED NG/ML
APPEARANCE UR: CLEAR
BARBITURATES UR QL SCN: NOT DETECTED NG/ML
BENZODIAZ UR QL SCN: NOT DETECTED NG/ML
BILIRUB UR QL STRIP: NEGATIVE
BUPRENORPHINE UR QL: NOT DETECTED NG/ML
CANNABINOIDS UR QL: ABNORMAL NG/ML
COCAINE UR QL SCN: NOT DETECTED NG/ML
COLOR UR AUTO: YELLOW
D-METHAMPHET UR QL: NOT DETECTED NG/ML
GLUCOSE UR STRIP-MCNC: NEGATIVE MG/DL
HCG UR QL: NEGATIVE
HGB UR QL STRIP: NEGATIVE
KETONES UR STRIP-MCNC: NEGATIVE MG/DL
LEUKOCYTE ESTERASE UR QL STRIP: NEGATIVE
METHADONE UR QL SCN: NOT DETECTED NG/ML
NITRATE UR QL: NEGATIVE
OPIATES UR QL SCN: NOT DETECTED NG/ML
OXYCODONE UR QL SCN: NOT DETECTED NG/ML
PCP UR QL SCN: NOT DETECTED NG/ML
PH UR STRIP: 7 PH (ref 5–7)
PROPOXYPH UR QL: NOT DETECTED NG/ML
SOURCE: NORMAL
SP GR UR STRIP: 1.02 (ref 1–1.03)
TRICYCLICS UR QL SCN: NOT DETECTED NG/ML
UROBILINOGEN UR STRIP-MCNC: 0 MG/DL (ref 0–2)

## 2020-06-07 PROCEDURE — 81003 URINALYSIS AUTO W/O SCOPE: CPT | Performed by: NURSE PRACTITIONER

## 2020-06-07 PROCEDURE — 96372 THER/PROPH/DIAG INJ SC/IM: CPT | Performed by: NURSE PRACTITIONER

## 2020-06-07 PROCEDURE — 87635 SARS-COV-2 COVID-19 AMP PRB: CPT | Performed by: NURSE PRACTITIONER

## 2020-06-07 PROCEDURE — 80306 DRUG TEST PRSMV INSTRMNT: CPT | Performed by: NURSE PRACTITIONER

## 2020-06-07 PROCEDURE — 81025 URINE PREGNANCY TEST: CPT | Performed by: NURSE PRACTITIONER

## 2020-06-07 PROCEDURE — 25000128 H RX IP 250 OP 636: Performed by: NURSE PRACTITIONER

## 2020-06-07 PROCEDURE — 99284 EMERGENCY DEPT VISIT MOD MDM: CPT | Performed by: NURSE PRACTITIONER

## 2020-06-07 PROCEDURE — 80053 COMPREHEN METABOLIC PANEL: CPT | Performed by: NURSE PRACTITIONER

## 2020-06-07 PROCEDURE — 99285 EMERGENCY DEPT VISIT HI MDM: CPT | Mod: Z6 | Performed by: NURSE PRACTITIONER

## 2020-06-07 RX ORDER — ORPHENADRINE CITRATE 30 MG/ML
60 INJECTION INTRAMUSCULAR; INTRAVENOUS ONCE
Status: COMPLETED | OUTPATIENT
Start: 2020-06-07 | End: 2020-06-07

## 2020-06-07 RX ORDER — ONDANSETRON 2 MG/ML
4 INJECTION INTRAMUSCULAR; INTRAVENOUS ONCE
Status: DISCONTINUED | OUTPATIENT
Start: 2020-06-07 | End: 2020-06-07 | Stop reason: HOSPADM

## 2020-06-07 RX ORDER — SODIUM CHLORIDE 9 MG/ML
1000 INJECTION, SOLUTION INTRAVENOUS CONTINUOUS
Status: DISCONTINUED | OUTPATIENT
Start: 2020-06-07 | End: 2020-06-07 | Stop reason: HOSPADM

## 2020-06-07 RX ADMIN — ORPHENADRINE CITRATE 60 MG: 30 INJECTION INTRAMUSCULAR; INTRAVENOUS at 18:56

## 2020-06-07 ASSESSMENT — ENCOUNTER SYMPTOMS
VOMITING: 1
ROS SKIN COMMENTS: BRUISES
NERVOUS/ANXIOUS: 1
NAUSEA: 1
BRUISES/BLEEDS EASILY: 1
TROUBLE SWALLOWING: 0
EYE REDNESS: 0
FEVER: 1
HEADACHES: 1
BACK PAIN: 1
ENDOCRINE NEGATIVE: 1
APPETITE CHANGE: 1
COUGH: 1
CHILLS: 1
WOUND: 1
FATIGUE: 1
DYSURIA: 0
FLANK PAIN: 0
ARTHRALGIAS: 1
CARDIOVASCULAR NEGATIVE: 1
SORE THROAT: 0

## 2020-06-07 NOTE — TELEPHONE ENCOUNTER
"Patient reports \"My neighbor found me passed out in the driveway last Sunday. Horrible migraine all week. I don't feel myself. Feel lightheaded. I have pressure in my temple. I have tingling all over my body.\" Patient states multiple times she doesn't feel right. Advised evaluation in the ED per protocol. Patient verbalized understanding and had no further questions.  Darline Yang RN/[facility]      COVID 19 Nurse Triage Plan/Patient Instructions    Please be aware that novel coronavirus (COVID-19) may be circulating in the community. If you develop symptoms such as fever, cough, or SOB or if you have concerns about the presence of another infection including coronavirus (COVID-19), please contact your health care provider or visit [email or web address].     Disposition/Instructions    Patient to go to ED and follow protocol based instructions. Follow System Ambulatory Workflow for COVID 19.     Bring Your Own Device:  Please also bring your smart device(s) (smart phones, tablets, laptops) and their charging cables for your personal use and to communicate with your care team during your visit.    Thank you for taking steps to prevent the spread of this virus.  o Limit your contact with others.  o Wear a simple mask to cover your cough.  o Wash your hands well and often.    Resources    [facility]: About COVID-19: [email or web address]    CDC: What to Do If You're Sick: www.cdc.gov/coronavirus/2019-ncov/about/steps-when-sick.html    CDC: Ending Home Isolation: www.cdc.gov/coronavirus/2019-ncov/hcp/disposition-in-home-patients.html     CDC: Caring for Someone: www.cdc.gov/coronavirus/2019-ncov/if-you-are-sick/care-for-someone.html     [facility]: Interim Guidance for Hospital Discharge to Home: www.health.[facility].[place].us/diseases/coronavirus/hcp/hospdischarge.pdf    [facility] clinical trials (COVID-19 research studies): [email or web address]-[email or web address]     Below are " "the COVID-19 hotlines at the [place] [facility] ([facility]). Interpreters are available.   o For health questions: Call 109-492-1411 or 1-375.580.9892 (7 a.m. to 7 p.m.)  o For questions about schools and childcare: Call 983-091-3847 or 1-397.975.8424 (7 a.m. to 7 p.m.)       Reason for Disposition    Patient sounds very sick or weak to the triager    Additional Information    Negative: [1] Weakness of the face, arm or leg on one side of the body AND [2] new onset    Negative: [1] Loss of speech or garbled speech AND [2] new onset    Negative: [1] Numbness of the face, arm or leg on one side of the body AND [2] new onset    Negative: Passed out (i.e., lost consciousness, collapsed and was not responding)    Negative: Difficult to awaken or acting confused (e.g., disoriented, slurred speech)    Negative: Sounds like a life-threatening emergency to the triager    Negative: Followed a head injury    Negative: Pregnant    Negative: Postpartum (from 0 to 6 weeks after delivery)    Negative: Traumatic Brain Injury (TBI) is suspected    Negative: Unable to walk, or can only walk with assistance (e.g., requires support)    Negative: Stiff neck (can't touch chin to chest)    Negative: [1] SEVERE headache AND [2] sudden-onset (i.e., reaching maximum intensity within seconds)    Negative: [1] SEVERE headache AND [2] fever    Negative: Severe pain in one eye    Negative: [1] Other family members (or roommates) with headaches AND [2] possibility of carbon monoxide exposure    Negative: [1] SEVERE headache (e.g., excruciating) AND [2] \"worst headache\" of life    Negative: Loss of vision or double vision (Exception: same as prior migraines)    Negative: [1] Fever > 100.0 F (37.8 C) AND [2] diabetes mellitus or weak immune system (e.g., HIV positive, cancer chemo, splenectomy, organ transplant, chronic steroids)    Protocols used: HEADACHE-A-AH      "

## 2020-06-08 ENCOUNTER — TRANSFERRED RECORDS (OUTPATIENT)
Dept: HEALTH INFORMATION MANAGEMENT | Facility: CLINIC | Age: 30
End: 2020-06-08

## 2020-06-08 ENCOUNTER — PATIENT OUTREACH (OUTPATIENT)
Dept: CARE COORDINATION | Facility: CLINIC | Age: 30
End: 2020-06-08

## 2020-06-08 DIAGNOSIS — Z20.822 SUSPECTED COVID-19 VIRUS INFECTION: Primary | ICD-10-CM

## 2020-06-08 LAB
SARS-COV-2 RNA SPEC QL NAA+PROBE: NOT DETECTED
SPECIMEN SOURCE: NORMAL

## 2020-06-08 NOTE — PROGRESS NOTES
Clinic Care Coordination Contact    Situation: Patient chart reviewed by care coordinator.    Background: patient was tested for COVID19.    Assessment: result is in process.     Plan/Recommendations:   CC RN will send get well referral and patient has declined care coordination in the past.     EDYTA OttoN RN Clinic Care Coordinator   [place], [place], [place]  Phone: [phone]

## 2020-06-08 NOTE — ED PROVIDER NOTES
"  History     Chief Complaint   Patient presents with     Headache     HPI  Katy Islas is a 30 year old female who is hearing impaired presenting with complaint of general malaise and \"feeling like shit\".  is used during visit. She reports that over the past week she has had any migraine headaches of which her Maxalt and Tylenol and Motrin has not helped.  She reports she took 800 mg of Motrin earlier this morning with no relief.  She also has associated nausea and vomiting.  She also mentions she has worsening right knee pain and has noted that her feet have been more swollen than normal.  She also notes she has bruising to her legs and she is not really sure how the bruises got there although she also reports she has had falls in the past few weeks.  She is concerned that she may have COVID.  She reports she had 100.6 degree fever at home this is what prompted her to come into the ED in addition to her headache and not feeling well.    Katy has a history of Chron's, migraine headaches, chronic pain, marijuana use with resistant nausea and vomiting, hypertension, and pseudoseizure disorder, borderline personality disorder.    In review of patient's medical records through care everywhere she was seen on 6/2/2020 and at the [facility] emergency department for headache, leg swelling, bruises, diarrhea, abdominal pain.  Patient left AMA when she was given IV acetaminophen.  She left the hospital spouting obscenities according to the ER note.    Katy does have a pain management contract and according to the [place]  she is on Buprenorphine 10 Mcg/hr patch for opioid dependence.  She was prescribed this on 6/1/2020 received 4 patches for a total of 28-day supply.    PCP: Aura Rosario     Allergies:  Allergies   Allergen Reactions     Iohexol Hives     Other reaction(s): Hives  IV contrast; patient states she tolerates contrast if she gets benadryl before  IV " contrast; patient states she tolerates contrast if she gets benadryl before     Ativan [Lorazepam] Hives     At the IV site     Baclofen      hives     Bees Hives, Swelling and Difficulty breathing     Caffeine      Contrast Dye      Hives,   Updated 5/10/2016 CT Contrast.     Dilaudid [Hydromorphone] Itching     Iodine Hives     Methocarbamol Swelling     Metoclopramide      Other reaction(s): Tremors  LW Reaction: shaking/sweating     Midazolam      Other reaction(s): Agitation     Monosodium Glutamate      Morphine Other (See Comments)     Difficulty with urination     Nsaids Other (See Comments)     GI BLEED x2     Other (Do Not Use) Other (See Comments)     Xanaflex- pt becomes disoriented and loses bladder control     Reglan [Metoclopramide Hcl] Other (See Comments)     shaking     Soma [Carisoprodol] Visual Disturbance     Sleep walking     Tizanidine      Topamax Other (See Comments)     Topamax [Topiramate] Nausea     Tingling  GI/Vomit     Tramadol      Severe Headache, Seizure Risk     Tylenol W/Codeine [Acetaminophen-Codeine] Nausea and Itching     Tylenol 3     Versed Other (See Comments)     Zolmitriptan      Makes face feel like its twitching     Droperidol Anxiety     Flu Virus Vaccine Rash      Arm swelling       Problem List:    Patient Active Problem List    Diagnosis Date Noted     Abscess of anal and rectal regions 04/27/2020     Priority: Medium     Added automatically from request for surgery [id number]       Rectal pain 04/19/2020     Priority: Medium     Abdominal pain 04/18/2020     Priority: Medium     Morbid obesity (H) 03/10/2020     Priority: Medium     Abnormal uterine bleeding 02/12/2020     Priority: Medium     Added automatically from request for surgery [id number]       Anal fissure 02/04/2020     Priority: Medium     Low hematocrit 02/04/2020     Priority: Medium     Elevated d-dimer 02/04/2020     Priority: Medium     Hypertension goal BP (blood pressure) < 130/80 08/05/2019      Priority: Medium     Bulge of cervical disc without myelopathy 04/24/2019     Priority: Medium     Cervicalgia 04/17/2019     Priority: Medium     Class 1 obesity due to excess calories without serious comorbidity with body mass index (BMI) of 32.0 to 32.9 in adult 04/03/2019     Priority: Medium     Hypophosphatemia 11/01/2018     Priority: Medium     Patellar maltracking, right 09/05/2018     Priority: Medium     Right anterior knee pain 09/05/2018     Priority: Medium     Melanocytic nevus, unspecified location 07/23/2018     Priority: Medium     Dysplastic skin lesion 07/23/2018     Priority: Medium     Iliotibial band syndrome affecting lower leg, right 12/21/2017     Priority: Medium     Chondromalacia of right patellofemoral joint 12/21/2017     Priority: Medium     Upper GI bleed - suspected 07/01/2017     Priority: Medium     Tobacco use disorder 07/01/2017     Priority: Medium     History of pseudoseizure 07/01/2017     Priority: Medium     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) 03/10/2017     Priority: Medium     Lumbar disc disease with radiculopathy 02/23/2017     Priority: Medium     S/P lumbar fusion 02/23/2017     Priority: Medium     Dr. YOVANI Millan, 2/23/17       Vitamin D deficiency 01/06/2017     Priority: Medium     Atypical mole 01/06/2017     Priority: Medium     Mild persistent asthma without complication 12/02/2016     Priority: Medium     Chronic bilateral low back pain with left-sided sciatica 12/02/2016     Priority: Medium     Bee sting allergy 09/16/2016     Priority: Medium     Gastroesophageal reflux disease without esophagitis 08/26/2016     Priority: Medium     Herpes simplex virus infection 11/12/2015     Priority: Medium     Adjustment disorder with mixed anxiety and depressed mood 10/14/2015     Priority: Medium     Marijuana abuse 02/22/2015     Priority: Medium     Bipolar disorder (H) 01/31/2013     Priority: Medium     Problem list name updated by automated process.  Provider to review       Chronic pain 02/09/2012     Priority: Medium     Patient is followed by Aura Rosario PA-C for ongoing prescription of pain medication.  All refills should only be approved by this provider, or covering partner.    Medication(s): Norco    Maximum quantity per month: 70  Clinic visit frequency required: Q 2 months     Controlled substance agreement:   Discussed and signed 4/25/17    Encounter-Level CSA - 01/12/2015:                 Controlled Substance Agreement - Scan on 1/26/2015  9:14 AM : Controlled Medication Agreement-01/12/15 (below)            Pain Clinic evaluation in the past: Yes       Date/Location:   [facility], fall 2016    DIRE Total Score(s):    4/25/2017   Total Score 17       Last [facility] website verification:  done on 4/25/17 by LOVE Maki   [email or web address]           Anxiety 09/22/2011     Priority: Medium     Mild major depression (H) 08/29/2011     Priority: Medium     Mild intermittent asthma 10/12/2009     Priority: Medium     Overview:   Formatting of this note might be different from the original.  Action plan: See letter 4/10/2013   ATAQ:   Asthma Data 4/10/2013   Date completed 4/10/2013   Missed daily activities no = 0   Wake at night no = 0   Believe asthma in control yes = 0   ARIN use yes   Maximum ARIN use in 1 day 1-4 = 0   ATAQ score 0 = well controlled   Asthma ED visits in past 12 mos 0   Asthma hospitalizations in past 12 mos 0     Current Outpatient Prescriptions   Medication Sig     albuterol (PROVENTIL) 0.083 % neb solution Inhale 3 mL via a nebulizer every 4 hours if needed for Shortness Of Breath.        Crohn's colitis (H) 08/04/2009     Priority: Medium     Dysmenorrhea 05/11/2007     Priority: Medium     Benign neoplasm of skin of lower limb, including hip 03/16/2004     Priority: Medium     Migraine      Priority: Medium     Dr. Farrar - Neurology.  Now on Inderal.  Seems to be working.  Follow-up  9/08  Problem list name updated by automated process. Provider to review       Hearing loss      Priority: Medium     Congenital  Problem list name updated by automated process. Provider to review       Allergic rhinitis      Priority: Medium     Problem list name updated by automated process. Provider to review          Past Medical History:    Past Medical History:   Diagnosis Date     Abdominal pain 10/31- 11/4/2005     Allergic rhinitis, cause unspecified      Arthritis      C. 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, without mention of intractable epilepsy      Migraine 07/21/12     Migraine, unspecified, without mention of intractable migraine without mention of status migrainosus      Mild intermittent asthma      Mycoplasma infection in conditions classified elsewhere and of unspecified site      Other chronic pain      Renal disease      Unspecified hearing loss        Past Surgical History:    Past Surgical History:   Procedure Laterality Date     AS REMOVAL OF COCCYX  10/18/2017     C FUSION OF SACROILIAC JOINT  10/26/2018     COLONOSCOPY  7/1/2009    [facility].     COLONOSCOPY N/A 7/17/2019    Procedure: Colonoscopy, With Polypectomy And Biopsy;  Surgeon: Edwin Conde MD;  Location: [facility] OR     COLONOSCOPY WITH CO2 INSUFFLATION N/A 7/17/2019    Procedure: COLONOSCOPY, WITH CO2 INSUFFLATION;  Surgeon: Edwin Conde MD;  Location: [facility] OR     COMBINED ESOPHAGOSCOPY, GASTROSCOPY, DUODENOSCOPY (EGD) WITH CO2 INSUFFLATION N/A 4/12/2019    Procedure: COMBINED ESOPHAGOSCOPY, GASTROSCOPY, DUODENOSCOPY (EGD) WITH CO2 INSUFFLATION;  Surgeon: Amaya  Edwin German MD;  Location: [facility] OR     ESOPHAGOSCOPY, GASTROSCOPY, DUODENOSCOPY (EGD), COMBINED N/A 4/12/2019    Procedure: Combined Esophagoscopy, Gastroscopy, Duodenoscopy (Egd), Biopsy Single Or Multiple;  Surgeon: Edwin Conde MD;  Location: [facility] OR     FISTULOTOMY RECTUM N/A 4/30/2020    Procedure: EXAM UNDER ANESTHESIA, ANUS, parital fistulotomy;  Surgeon: Valentin Sanchez MD;  Location: [facility] OR     FUSION SPINE POSTERIOR MINIMALLY INVASIVE ONE LEVEL N/A 2/23/2017    Procedure: FUSION SPINE POSTERIOR MINIMALLY INVASIVE ONE LEVEL;  L4-5 Oblique Lateral Lumbar Interbody Fusion   Epidural steroid injection.   Transpedicular Bone marrow aspiration;  Surgeon: Jeniffer Eugene MD;  Location: [facility] OR     HC COLONOSCOPY THRU STOMA WITH BIOPSY  10/29/2003    Impression is that of normal appearing colonoscopy, without evidence of rectal bleeding.     HC COLONOSCOPY THRU STOMA, DIAGNOSTIC  10/00    normal     HC COLONOSCOPY THRU STOMA, DIAGNOSTIC  Oct 2009    Dr López- normal     HC EEG AWAKE AND SLEEP      abnormal     HC MRI BRAIN W/O CONTRAST  12/00    normal     HC REMOVAL GALLBLADDER  8/5/2009    [facility].     HC UGI ENDOSCOPY DIAG W BIOPSY  11/11/09    Normal esophagus     HC UGI ENDOSCOPY, SIMPLE EXAM  7/00, 10/00    mild chronic esophagitis and duodenitis, neg H pylori     HC UGI ENDOSCOPY, SIMPLE EXAM  01/20/2005    Esophagogastroduodenoscopy, colonoscopy with biopsies.  [facility] UGI ENDOSCOPY, SIMPLE EXAM  7/1/2009    [facility].      UGI ENDOSCOPY, SIMPLE EXAM  11/11/2009    attempted upper GI, pt. could not tolerate procedure:[facility]     ORTHOPEDIC SURGERY  October 19,2011    diskectomy L4-L5       Family History:    Family History   Problem Relation Age of Onset     Gastrointestinal Disease Brother         severe Crohn's     Neurologic Disorder Brother         Seizures post head injury     Depression  Brother      Substance Abuse Brother      Genitourinary Problems Father         kidney stones     Diabetes Father      Heart Disease Father         Open heart surgery     Breast Cancer Maternal Grandmother      Parkinsonism Maternal Grandmother      Cerebrovascular Disease Paternal Grandmother      Cancer Maternal Grandfather         Lung     Cardiovascular Paternal Grandfather         Heart Attack     Substance Abuse Mother         in recovery x1 year      Lung Cancer Paternal Uncle      Cancer Paternal Uncle      Lung Cancer Maternal Uncle        Social History:  Marital Status:  Single [1]  Social History     Tobacco Use     Smoking status: Current Every Day Smoker     Packs/day: 0.25     Years: 5.00     Pack years: 1.25     Types: Cigarettes     Smokeless tobacco: Never Used   Substance Use Topics     Alcohol use: Not Currently     Comment: Not since last July 2018     Drug use: Yes     Types: Marijuana     Comment: Medical Marijuana currently-- More CBD        Medications:    acetaminophen (TYLENOL) 500 MG tablet  albuterol (PROAIR HFA/PROVENTIL HFA/VENTOLIN HFA) 108 (90 Base) MCG/ACT inhaler  albuterol (PROVENTIL) (2.5 MG/3ML) 0.083% neb solution  alum & mag hydroxide-simethicone (MAALOX ADVANCED MAX ST) 400-400-40 MG/5ML SUSP suspension  ARIPiprazole (ABILIFY) 30 MG tablet  aspirin (ASA) 81 MG tablet  azelastine (OPTIVAR) 0.05 % ophthalmic solution  buprenorphine (BUTRANS) 10 MCG/HR WK patch  buPROPion (WELLBUTRIN XL) 150 MG 24 hr tablet  busPIRone (BUSPAR) 15 MG tablet  cyclobenzaprine (FLEXERIL) 10 MG tablet  diphenhydrAMINE (BENADRYL) 50 MG capsule  DULoxetine (CYMBALTA) 60 MG EC capsule  EPINEPHrine (ANY BX GENERIC EQUIV) 0.3 MG/0.3ML injection 2-pack  fexofenadine (ALLEGRA) 180 MG tablet  fluticasone (FLONASE) 50 MCG/ACT nasal spray  gabapentin (NEURONTIN) 300 MG capsule  HYDROmorphone (DILAUDID) 2 MG tablet  lidocaine (XYLOCAINE) 2 % solution  losartan (COZAAR) 100 MG tablet  medical cannabis (Patient's  own supply)  medroxyPROGESTERone (DEPO-PROVERA) 150 MG/ML IM injection  methocarbamol (ROBAXIN) 750 MG tablet  metoprolol succinate ER (TOPROL-XL) 200 MG 24 hr tablet  NONFORMULARY  ondansetron (ZOFRAN-ODT) 8 MG ODT tab  oxyCODONE-acetaminophen (PERCOCET) 5-325 MG tablet  pantoprazole (PROTONIX) 40 MG EC tablet  polyethylene glycol (MIRALAX) 17 g packet  prazosin (MINIPRESS) 1 MG capsule  prochlorperazine (COMPAZINE) 10 MG tablet  rizatriptan (MAXALT-MLT) 5 MG ODT  sucralfate (CARAFATE) 1 GM tablet  valACYclovir (VALTREX) 1000 mg tablet          Review of Systems   Constitutional: Positive for appetite change, chills, fatigue and fever.   HENT: Negative for sore throat and trouble swallowing.    Eyes: Negative for redness.   Respiratory: Positive for cough.    Cardiovascular: Negative.    Gastrointestinal: Positive for nausea and vomiting.   Endocrine: Negative.    Genitourinary: Negative for dysuria and flank pain.   Musculoskeletal: Positive for arthralgias and back pain.   Skin: Positive for wound.        bruises   Allergic/Immunologic: Negative for immunocompromised state.   Neurological: Positive for headaches.   Hematological: Bruises/bleeds easily.   Psychiatric/Behavioral: The patient is nervous/anxious.        Physical Exam   BP: (!) 149/98  Heart Rate: 100  Temp: 99.2  F (37.3  C)  Resp: 18  SpO2: 97 %      Physical Exam  Vitals signs and nursing note reviewed.   Constitutional:       Appearance: Normal appearance. She is obese. She is not ill-appearing or toxic-appearing.   HENT:      Head: Normocephalic and atraumatic.      Nose: Nose normal.      Mouth/Throat:      Mouth: Mucous membranes are moist.      Pharynx: Oropharynx is clear.   Eyes:      Extraocular Movements: Extraocular movements intact.      Conjunctiva/sclera: Conjunctivae normal.      Pupils: Pupils are equal, round, and reactive to light.   Neck:      Musculoskeletal: Neck supple.   Cardiovascular:      Rate and Rhythm: Regular rhythm.  Tachycardia present.      Pulses: Normal pulses.      Heart sounds: Normal heart sounds.   Pulmonary:      Effort: Pulmonary effort is normal.      Breath sounds: Normal breath sounds.   Abdominal:      General: Abdomen is flat. Bowel sounds are normal. There is no distension.      Palpations: Abdomen is soft.      Tenderness: There is no abdominal tenderness.   Musculoskeletal:      Right lower leg: No edema.      Left lower leg: No edema.   Skin:     General: Skin is warm and dry.      Capillary Refill: Capillary refill takes less than 2 seconds.          Neurological:      General: No focal deficit present.      Mental Status: She is alert.   Psychiatric:         Mood and Affect: Mood normal.         ED Course  Diff: COVID-19, migraine headache not intractable, hyperemesis from cannabis use, UTI    I discussed with me again through an  we will proceed with COVID testing, obtain baseline labs, obtain a chest x-ray, and proceed with IV fluids addition to pain control that would be non-narcotic in nature.  Patient reports she understands and amenable to plan of care.        Procedures               Critical Care time:  none               Results for orders placed or performed during the hospital encounter of 06/07/20 (from the past 24 hour(s))   Urine Drugs of Abuse Screen Panel 13   Result Value Ref Range    Cannabinoids (67-[id number]-6-carboxy-9-THC) Detected, Abnormal Result (A) NDET^Not Detected ng/mL    Phencyclidine (Phencyclidine) Not Detected NDET^Not Detected ng/mL    Cocaine (Benzoylecgonine) Not Detected NDET^Not Detected ng/mL    Methamphetamine (d-Methamphetamine) Not Detected NDET^Not Detected ng/mL    Opiates (Morphine) Not Detected NDET^Not Detected ng/mL    Amphetamine (d-Amphetamine) Not Detected NDET^Not Detected ng/mL    Benzodiazepines (Nordiazepam) Not Detected NDET^Not Detected ng/mL    Tricyclic Antidepressants (Desipramine) Not Detected NDET^Not Detected ng/mL    Methadone  (Methadone) Not Detected NDET^Not Detected ng/mL    Barbiturates (Butalbital) Not Detected NDET^Not Detected ng/mL    Oxycodone (Oxycodone) Not Detected NDET^Not Detected ng/mL    Propoxyphene (Norpropoxyphene) Not Detected NDET^Not Detected ng/mL    Buprenorphine (Buprenorphine) Not Detected NDET^Not Detected ng/mL   UA reflex to Microscopic   Result Value Ref Range    Color Urine Yellow     Appearance Urine Clear     Glucose Urine Negative NEG^Negative mg/dL    Bilirubin Urine Negative NEG^Negative    Ketones Urine Negative NEG^Negative mg/dL    Specific Gravity Urine 1.020 1.003 - 1.035    Blood Urine Negative NEG^Negative    pH Urine 7.0 5.0 - 7.0 pH    Protein Albumin Urine Negative NEG^Negative mg/dL    Urobilinogen mg/dL 0.0 0.0 - 2.0 mg/dL    Nitrite Urine Negative NEG^Negative    Leukocyte Esterase Urine Negative NEG^Negative    Source Unspecified Urine    HCG qualitative urine   Result Value Ref Range    HCG Qual Urine Negative NEG^Negative     *Note: Due to a large number of results and/or encounters for the requested time period, some results have not been displayed. A complete set of results can be found in Results Review.       Medications   0.9% sodium chloride BOLUS (has no administration in time range)     Followed by   sodium chloride 0.9% infusion (has no administration in time range)   ondansetron (ZOFRAN) injection 4 mg (has no administration in time range)   orphenadrine (NORFLEX) injection 60 mg (60 mg Intramuscular Given 6/7/20 1856)       Assessments & Plan (with Medical Decision Making)  1915: Patient reports she does not want any further IV attempts and no further labs.  Ports she is feeling better and would like just to go home and sleep.  He has been advised we have not completed our work-up at this time and recommend for her to stay for further eval.  Patient reports she would like to be discharged against medical advisement and go home and she will follow-up with her PCP this week.      I have reviewed the nursing notes.    I have reviewed the findings, diagnosis, plan and need for follow up with the patient.       Discharge Medication List as of 6/7/2020  7:24 PM          Final diagnoses:   Tension headache   Fatigue   Chronic pain       6/7/2020   [facility] EMERGENCY DEPARTMENT     Nina Wilson, NATA CNP  06/07/20 2000

## 2020-06-09 ENCOUNTER — HOSPITAL ENCOUNTER (OUTPATIENT)
Dept: MRI IMAGING | Facility: CLINIC | Age: 30
Discharge: HOME | End: 2020-06-09
Attending: PHYSICIAN ASSISTANT | Admitting: PHYSICIAN ASSISTANT
Payer: MEDICARE

## 2020-06-09 DIAGNOSIS — H69.92 DYSFUNCTION OF LEFT EUSTACHIAN TUBE: ICD-10-CM

## 2020-06-09 DIAGNOSIS — M22.2X1 PATELLOFEMORAL PAIN SYNDROME OF BOTH KNEES: ICD-10-CM

## 2020-06-09 DIAGNOSIS — M22.2X2 PATELLOFEMORAL PAIN SYNDROME OF BOTH KNEES: ICD-10-CM

## 2020-06-09 DIAGNOSIS — M25.561 PAIN IN LATERAL PORTION OF RIGHT KNEE: ICD-10-CM

## 2020-06-09 PROCEDURE — 73721 MRI JNT OF LWR EXTRE W/O DYE: CPT | Mod: RT

## 2020-06-10 ENCOUNTER — VIRTUAL VISIT (OUTPATIENT)
Dept: FAMILY MEDICINE | Facility: OTHER | Age: 30
End: 2020-06-10
Payer: MEDICARE

## 2020-06-10 DIAGNOSIS — F31.63 BIPOLAR DISORDER, CURRENT EPISODE MIXED, SEVERE, WITHOUT PSYCHOTIC FEATURES (H): ICD-10-CM

## 2020-06-10 DIAGNOSIS — K50.113 CROHN'S DISEASE OF COLON WITH FISTULA (H): ICD-10-CM

## 2020-06-10 DIAGNOSIS — H91.93 BILATERAL HEARING LOSS, UNSPECIFIED HEARING LOSS TYPE: ICD-10-CM

## 2020-06-10 DIAGNOSIS — N94.6 DYSMENORRHEA: ICD-10-CM

## 2020-06-10 DIAGNOSIS — I10 HYPERTENSION GOAL BP (BLOOD PRESSURE) < 130/80: Primary | ICD-10-CM

## 2020-06-10 DIAGNOSIS — K61.2 ABSCESS OF ANAL AND RECTAL REGIONS: ICD-10-CM

## 2020-06-10 DIAGNOSIS — G89.4 CHRONIC PAIN SYNDROME: ICD-10-CM

## 2020-06-10 PROCEDURE — 99442 ZZC PHYSICIAN TELEPHONE EVALUATION 11-20 MIN: CPT | Performed by: PHYSICIAN ASSISTANT

## 2020-06-10 RX ORDER — FLUTICASONE PROPIONATE 50 MCG
1 SPRAY, SUSPENSION (ML) NASAL DAILY
Qty: 16 G | Refills: 1 | Status: SHIPPED | OUTPATIENT
Start: 2020-06-10 | End: 2021-02-12

## 2020-06-10 NOTE — Clinical Note
LJ, patient's fistula healed but she told me she will not have surgery during the summer, I advised her to focus on her back/pain & mental health prior to this anyway. She is now on Butrans patch for her pain

## 2020-06-10 NOTE — TELEPHONE ENCOUNTER
Prescription approved per [facility].  Iggy Bernstein, RN, BSN

## 2020-06-11 ENCOUNTER — OFFICE VISIT (OUTPATIENT)
Dept: ORTHOPEDICS | Facility: CLINIC | Age: 30
End: 2020-06-11
Payer: MEDICARE

## 2020-06-11 ENCOUNTER — TELEPHONE (OUTPATIENT)
Dept: ORTHOPEDICS | Facility: CLINIC | Age: 30
End: 2020-06-11

## 2020-06-11 VITALS
SYSTOLIC BLOOD PRESSURE: 132 MMHG | DIASTOLIC BLOOD PRESSURE: 86 MMHG | BODY MASS INDEX: 34.96 KG/M2 | WEIGHT: 190 LBS | HEIGHT: 62 IN

## 2020-06-11 DIAGNOSIS — M22.2X2 PATELLOFEMORAL PAIN SYNDROME OF BOTH KNEES: ICD-10-CM

## 2020-06-11 DIAGNOSIS — M22.2X1 PATELLOFEMORAL PAIN SYNDROME OF BOTH KNEES: Primary | ICD-10-CM

## 2020-06-11 DIAGNOSIS — M22.8X1 PATELLAR MALTRACKING, RIGHT: Primary | ICD-10-CM

## 2020-06-11 DIAGNOSIS — M17.0 PRIMARY OSTEOARTHRITIS OF BOTH KNEES: ICD-10-CM

## 2020-06-11 DIAGNOSIS — M22.2X1 PATELLOFEMORAL PAIN SYNDROME OF BOTH KNEES: ICD-10-CM

## 2020-06-11 DIAGNOSIS — M22.2X2 PATELLOFEMORAL PAIN SYNDROME OF BOTH KNEES: Primary | ICD-10-CM

## 2020-06-11 PROCEDURE — 20610 DRAIN/INJ JOINT/BURSA W/O US: CPT | Mod: 50 | Performed by: ORTHOPAEDIC SURGERY

## 2020-06-11 PROCEDURE — T1013 SIGN LANG/ORAL INTERPRETER: HCPCS | Mod: U3 | Performed by: ORTHOPAEDIC SURGERY

## 2020-06-11 PROCEDURE — 99213 OFFICE O/P EST LOW 20 MIN: CPT | Mod: 25 | Performed by: ORTHOPAEDIC SURGERY

## 2020-06-11 RX ORDER — TRIAMCINOLONE ACETONIDE 40 MG/ML
40 INJECTION, SUSPENSION INTRA-ARTICULAR; INTRAMUSCULAR ONCE
Status: COMPLETED | OUTPATIENT
Start: 2020-06-11 | End: 2020-06-11

## 2020-06-11 RX ADMIN — TRIAMCINOLONE ACETONIDE 40 MG: 40 INJECTION, SUSPENSION INTRA-ARTICULAR; INTRAMUSCULAR at 15:40

## 2020-06-11 ASSESSMENT — MIFFLIN-ST. JEOR: SCORE: 1530.08

## 2020-06-11 ASSESSMENT — PAIN SCALES - GENERAL: PAINLEVEL: MODERATE PAIN (4)

## 2020-06-11 NOTE — TELEPHONE ENCOUNTER
After her appointment patient mentioned she would like to try topical Voltaren. She cannot tolerated NSAIDs due to GI upset but otherwise no other reactions. According to her chart she has GI bleed with previous oral NSAIDs.  Did discuss that Voltaren is in the same class as oral NSAIDs but typically better tolerated.      Did discuss that insurance may not cover it, and she would like the order placed anyways. Any GI upset or other symptoms to stop medication.   She does understand the risks that there could be GI issues with topical Voltaren, but she would like to try anyways as tylenol is not helping the pain. Prescribed a small amount. If it does help would then request she speak with her PCP about long term dosing as they would be in a better position to monitor for potential side effects.     NATA Emerson, CNP  Orthopedic Surgery

## 2020-06-11 NOTE — LETTER
6/11/2020         RE: Katy Islas  [address]  [place] [address]        Dear Colleague,    Thank you for referring your patient, Katy Islas, to the [facility]. Please see a copy of my visit note below.    Office Visit-Follow up    Chief Complaint: Katy Islas is a 30 year old female who is being seen for   Chief Complaint   Patient presents with     RECHECK     Right anterior knee pain, right patellar maltracking        History of Present Illness:   Today's visit:    Returns for bilateral knees.  Through the use of a  we can irrigated.  Same pain is in the past.  No new injuries.  She would like injections today.  As well as MRI results.  February 24, 2020 visit:  Patient states pain in bilateral knees.  She admits to off/on pain of right knee for many years and has had previous injections which help and physical therapy which she reports has made pain worse.  The past 3 weeks have been especially painful.  Patient states activities that are difficult for her include:  Going up and down stairs, kneeling or other bent knee activity.  She reports around 3 weeks ago she did fall on ice but did not notice injury at that time.  About 2 weeks ago she states she had difficulty walking especially with right knee.   This has improved since then.  She states she is getting back pain which she attributes to her knee pain. Pain in right knee is lateral knee pain and deep. The left knee has prepatellar and medial joint line pain.    9/5/18 visit:  Returns to clinic today for her right knee. Communication done with  for sign language.  Previous intra-articular injection provided good relief up until recently.  The injection prior to that (IT band injection) did not provide much relief.  States same symptoms as before, anterior knee pain, worst with stairs, hills, and squatting.  Also being seen by spine for SI joint and did go through some  combined SI joint and knee PT, 6 sessions, and this aggravated the knee.  No new injuries.  Has not had dedicated anterior knee PT before, has not tried bracing.  Notes she will in the near future undergoing bilateral SI joint fusions.    5/21/18 visit  Communication through an  sign language.  After her last visit she received a right knee distal IT band injection with that provider 1 month of very little relief.  She has been taking Flexeril.  Pain is worse when she goes from a kneeling to standing position.  No new traumas or injuries.  Right knee much worse than left.  Left is recently started bothering her  December 21, 2017 visit:  Katy Islas is a 27 year old female who is seen in consultation at the request of Amber Moss for evaluation of right knee pain.  Mechanism of Injury: No trauma or inciting event.  Location: right knee lateral, anterior  Duration of Pain: On and off for a few years  Rating of Pain:  moderate.    Pain Quality: aching and sharp  Pain is better with: Rest  Pain is worse with: Walking  Treatment so far consists of: Narcotics, previous injections into her knees years ago.   Associated Features: Swelling  Prior history of related problems:    utilized throughout her visit  She had back surgery October 2016.      Social History     Occupational History     Not on file   Tobacco Use     Smoking status: Current Every Day Smoker     Packs/day: 0.25     Years: 5.00     Pack years: 1.25     Types: Cigarettes     Smokeless tobacco: Never Used   Substance and Sexual Activity     Alcohol use: Not Currently     Comment: Not since last July 2018     Drug use: Yes     Types: Marijuana     Comment: Medical Marijuana currently-- More CBD     Sexual activity: Not Currently     Partners: Male     Birth control/protection: Condom, Injection       REVIEW OF SYSTEMS  General: negative for, night sweats, dizziness, fatigue  Resp: No shortness of breath and no  "cough  CV: negative for chest pain, syncope or near-syncope  GI: negative for nausea, vomiting and diarrhea  : negative for dysuria and hematuria  Musculoskeletal: as above  Neurologic: negative for syncope   Hematologic: negative for bleeding disorder    Physical Exam:  Vitals: /86   Ht 1.575 m (5' 2\")   Wt 86.2 kg (190 lb)   BMI 34.75 kg/m    BMI= Body mass index is 34.75 kg/m .  Constitutional: healthy, alert and no acute distress   Psychiatric: mentation appears normal and affect normal/bright  NEURO: no focal deficits  RESP: Normal with easy respirations and no use of accessory muscles to breathe, no audible wheezing or retractions  CV: bilateral lower extremity:   no edema         Regular rate and rhythm by palpation  SKIN: No erythema, rashes, excoriation, or breakdown. No evidence of infection.   JOINT/EXTREMITIES:bilateral knees: 0-120 degrees active motion.  Has not see with extension.  Right knee has a valgus type alignment.  Tenderness along patella with motion.  No evidence of lateral retinacular tightness.  Positive J sign on the right  GAIT: not tested no peripheral lymphedema            Diagnostic Modalities:  right knee MRI: Small to moderate knee effusion.  Patella alto with a I-S ratio 1.5.  Soft tissue edema superior lateral aspect of Hoffa's fat pad.  Slight lateral patellar subluxation with tilt.  Areas of full-thickness cartilage loss and subchondral edema along the lateral patellar facet.  Superior trochlear spurring.  Medial lateral compartments including the meniscus and articular cartilage are intact with no high-grade cartilage loss.  Independent visualization of the images was performed.      Impression: bilateral patellofemoral chondromalacia/arthritis and pain  Right knee patellar maltracking    Plan:  All of the above pertinent physical exam and imaging modalities findings was reviewed with Katy and her mother through a .                            "               CONSERVATIVE CARE:  I recommend conservative care for the patient to include focused self directed physical therapy, formal physical therapy, steroid injections, weight loss. Today I provided or dispensed physical therapy.                                        INJECTION PROCEDURE:  The patient was counseled about an  injection, including discussion of risks (including infection), contents of the injection, rationale for performing the injection, and expected benefits of the injection. The skin was prepped with alcohol and betadine and then utilizing sterile technique an injection of the bilateral knee joint from the anterolateral approach in the seated position was performed. The injection consisted 1ml of Kenalog (40mg per 1 ml) mixed with 3ml of 0.5% Marcaine. The patient tolerated the injection well, and there were no complications. The injection site was covered with a Band-Aid. The injection was performed by NATA Fabian CNP, GIACOMO    We discussed the options.  We also discussed weight loss.  Most important component of her recovery is physical therapy.      Return to clinic 4-6 , week(s), PRN, or sooner as needed for changes.  Re-x-ray on return: Brigitte Maldonado D.O.          Clinic Administered Medication Documentation      Injection Documentation     Injection was administered by provider (please see MAR for given by information). Please see MAR and medication order for additional information.     Site: Joint injection   Medication Used: Kenalog 40mg   Expiration Date:  10/2021  The following medication was given by NATA Emerson, CNP, GIACOMO:     MEDICATION: Kenalog 40mg/1ml  ROUTE: Joint Injection  SITE: bilateral knee  DOSE: 1 mL  LOT #: [id number]  : [employer]  EXPIRATION DATE:  10/2021  NDC: [address]-9                            Again, thank you for allowing me to participate in the care of your patient.        Sincerely,        Will Maldonado  DO

## 2020-06-11 NOTE — PROGRESS NOTES
Office Visit-Follow up    Chief Complaint: Katy Islas is a 30 year old female who is being seen for   Chief Complaint   Patient presents with     RECHECK     Right anterior knee pain, right patellar maltracking        History of Present Illness:   Today's visit:    Returns for bilateral knees.  Through the use of a  we can irrigated.  Same pain is in the past.  No new injuries.  She would like injections today.  As well as MRI results.  February 24, 2020 visit:  Patient states pain in bilateral knees.  She admits to off/on pain of right knee for many years and has had previous injections which help and physical therapy which she reports has made pain worse.  The past 3 weeks have been especially painful.  Patient states activities that are difficult for her include:  Going up and down stairs, kneeling or other bent knee activity.  She reports around 3 weeks ago she did fall on ice but did not notice injury at that time.  About 2 weeks ago she states she had difficulty walking especially with right knee.   This has improved since then.  She states she is getting back pain which she attributes to her knee pain. Pain in right knee is lateral knee pain and deep. The left knee has prepatellar and medial joint line pain.    9/5/18 visit:  Returns to clinic today for her right knee. Communication done with  for sign language.  Previous intra-articular injection provided good relief up until recently.  The injection prior to that (IT band injection) did not provide much relief.  States same symptoms as before, anterior knee pain, worst with stairs, hills, and squatting.  Also being seen by spine for SI joint and did go through some combined SI joint and knee PT, 6 sessions, and this aggravated the knee.  No new injuries.  Has not had dedicated anterior knee PT before, has not tried bracing.  Notes she will in the near future undergoing bilateral SI joint fusions.    5/21/18  visit  Communication through an  sign language.  After her last visit she received a right knee distal IT band injection with that provider 1 month of very little relief.  She has been taking Flexeril.  Pain is worse when she goes from a kneeling to standing position.  No new traumas or injuries.  Right knee much worse than left.  Left is recently started bothering her  December 21, 2017 visit:  Katy Islas is a 27 year old female who is seen in consultation at the request of Amber Moss for evaluation of right knee pain.  Mechanism of Injury: No trauma or inciting event.  Location: right knee lateral, anterior  Duration of Pain: On and off for a few years  Rating of Pain:  moderate.    Pain Quality: aching and sharp  Pain is better with: Rest  Pain is worse with: Walking  Treatment so far consists of: Narcotics, previous injections into her knees years ago.   Associated Features: Swelling  Prior history of related problems:    utilized throughout her visit  She had back surgery October 2016.      Social History     Occupational History     Not on file   Tobacco Use     Smoking status: Current Every Day Smoker     Packs/day: 0.25     Years: 5.00     Pack years: 1.25     Types: Cigarettes     Smokeless tobacco: Never Used   Substance and Sexual Activity     Alcohol use: Not Currently     Comment: Not since last July 2018     Drug use: Yes     Types: Marijuana     Comment: Medical Marijuana currently-- More CBD     Sexual activity: Not Currently     Partners: Male     Birth control/protection: Condom, Injection       REVIEW OF SYSTEMS  General: negative for, night sweats, dizziness, fatigue  Resp: No shortness of breath and no cough  CV: negative for chest pain, syncope or near-syncope  GI: negative for nausea, vomiting and diarrhea  : negative for dysuria and hematuria  Musculoskeletal: as above  Neurologic: negative for syncope   Hematologic: negative for bleeding  "disorder    Physical Exam:  Vitals: /86   Ht 1.575 m (5' 2\")   Wt 86.2 kg (190 lb)   BMI 34.75 kg/m    BMI= Body mass index is 34.75 kg/m .  Constitutional: healthy, alert and no acute distress   Psychiatric: mentation appears normal and affect normal/bright  NEURO: no focal deficits  RESP: Normal with easy respirations and no use of accessory muscles to breathe, no audible wheezing or retractions  CV: bilateral lower extremity:   no edema         Regular rate and rhythm by palpation  SKIN: No erythema, rashes, excoriation, or breakdown. No evidence of infection.   JOINT/EXTREMITIES:bilateral knees: 0-120 degrees active motion.  Has not see with extension.  Right knee has a valgus type alignment.  Tenderness along patella with motion.  No evidence of lateral retinacular tightness.  Positive J sign on the right  GAIT: not tested no peripheral lymphedema            Diagnostic Modalities:  right knee MRI: Small to moderate knee effusion.  Patella alto with a I-S ratio 1.5.  Soft tissue edema superior lateral aspect of Hoffa's fat pad.  Slight lateral patellar subluxation with tilt.  Areas of full-thickness cartilage loss and subchondral edema along the lateral patellar facet.  Superior trochlear spurring.  Medial lateral compartments including the meniscus and articular cartilage are intact with no high-grade cartilage loss.  Independent visualization of the images was performed.      Impression: bilateral patellofemoral chondromalacia/arthritis and pain  Right knee patellar maltracking    Plan:  All of the above pertinent physical exam and imaging modalities findings was reviewed with Katy and her mother through a .                                          CONSERVATIVE CARE:  I recommend conservative care for the patient to include focused self directed physical therapy, formal physical therapy, steroid injections, weight loss. Today I provided or dispensed physical therapy.          "                               INJECTION PROCEDURE:  The patient was counseled about an  injection, including discussion of risks (including infection), contents of the injection, rationale for performing the injection, and expected benefits of the injection. The skin was prepped with alcohol and betadine and then utilizing sterile technique an injection of the bilateral knee joint from the anterolateral approach in the seated position was performed. The injection consisted 1ml of Kenalog (40mg per 1 ml) mixed with 3ml of 0.5% Marcaine. The patient tolerated the injection well, and there were no complications. The injection site was covered with a Band-Aid. The injection was performed by Lew Coates, APRN, CNP, DNP    We discussed the options.  We also discussed weight loss.  Most important component of her recovery is physical therapy.      Return to clinic 4-6 , week(s), PRN, or sooner as needed for changes.  Re-x-ray on return: No    Tomi Maldonado D.O.

## 2020-06-11 NOTE — PROGRESS NOTES
Clinic Administered Medication Documentation      Injection Documentation     Injection was administered by provider (please see MAR for given by information). Please see MAR and medication order for additional information.     Site: Joint injection   Medication Used: Kenalog 40mg   Expiration Date:  10/2021  The following medication was given by NATA Emerson, CNP, DNP:     MEDICATION: Kenalog 40mg/1ml  ROUTE: Joint Injection  SITE: bilateral knee  DOSE: 1 mL  LOT #: [id number]  : [employer]  EXPIRATION DATE:  10/2021  NDC: [address]-1

## 2020-06-12 DIAGNOSIS — F41.9 ANXIETY: ICD-10-CM

## 2020-06-12 DIAGNOSIS — F43.23 ADJUSTMENT DISORDER WITH MIXED ANXIETY AND DEPRESSED MOOD: ICD-10-CM

## 2020-06-12 RX ORDER — ARIPIPRAZOLE 30 MG/1
30 TABLET ORAL AT BEDTIME
Refills: 0 | OUTPATIENT
Start: 2020-06-12

## 2020-06-15 ENCOUNTER — TELEPHONE (OUTPATIENT)
Dept: FAMILY MEDICINE | Facility: OTHER | Age: 30
End: 2020-06-15

## 2020-06-15 ENCOUNTER — TELEPHONE (OUTPATIENT)
Dept: SURGERY | Facility: CLINIC | Age: 30
End: 2020-06-15

## 2020-06-15 NOTE — TELEPHONE ENCOUNTER
[facility] Call Center    Phone Message    May a detailed message be left on voicemail: yes     Reason for Call: Symptoms or Concerns     If patient has red-flag symptoms, warm transfer to triage line    Current symptom or concern: Red and Painful Bottom (Where She Had Surgery), Drainage, and Some Bleeding    Symptoms have been present for:  1-2 day(s)    Has patient previously been seen for this? Yes    By : Dr. Sanchez    Date: 06/15/2020    Are there any new or worsening symptoms? Yes: Red and Painful Bottom (Where She Had Surgery), Drainage, and Some Bleeding      Action Taken: Message routed to:  [facility]al     Travel Screening: Not Applicable

## 2020-06-15 NOTE — TELEPHONE ENCOUNTER
Reason for call:  Patient reporting a symptom    Symptom or request: Bleeding, burning, redness of rectum    Duration (how long have symptoms been present): 2 days ago    Have you been treated for this before? No    Additional comments: Patient is asking to be triaged as she can't see her specialty doctor until Thursday.    Phone Number patient can be reached at:  Home number on file [phone] (home)    Best Time:  any    Can we leave a detailed message on this number:  YES    Call taken on 6/15/2020 at 6:18 PM by Genie Luo

## 2020-06-16 DIAGNOSIS — M22.2X1 PATELLOFEMORAL PAIN SYNDROME OF BOTH KNEES: Primary | ICD-10-CM

## 2020-06-16 DIAGNOSIS — M17.0 PRIMARY OSTEOARTHRITIS OF BOTH KNEES: ICD-10-CM

## 2020-06-16 DIAGNOSIS — M22.2X2 PATELLOFEMORAL PAIN SYNDROME OF BOTH KNEES: Primary | ICD-10-CM

## 2020-06-18 ENCOUNTER — TELEPHONE (OUTPATIENT)
Dept: FAMILY MEDICINE | Facility: OTHER | Age: 30
End: 2020-06-18

## 2020-06-18 NOTE — TELEPHONE ENCOUNTER
Prior Authorization Retail Medication Request    Medication/Dose: diclofenac (VOLTAREN) 1 % topical gel  ICD code (if different than what is on RX):    Previously Tried and Failed:    Rationale:      Insurance Name: Medicare  Insurance ID:  [id number]      Pharmacy Information (if different than what is on RX)  Name:    Phone:

## 2020-06-19 NOTE — PROGRESS NOTES
"Katy Islas is a 30 year old female who is being evaluated via a billable telephone visit.      The patient has been notified of following:     \"This telephone visit will be conducted via a call between you and your physician/provider. We have found that certain health care needs can be provided without the need for a physical exam.  This service lets us provide the care you need with a short phone conversation.  If a prescription is necessary we can send it directly to your pharmacy.  If lab work is needed we can place an order for that and you can then stop by our lab to have the test done at a later time.    Telephone visits are billed at different rates depending on your insurance coverage. During this emergency period, for some insurers they may be billed the same as an in-person visit.  Please reach out to your insurance provider with any questions.    If during the course of the call the physician/provider feels a telephone visit is not appropriate, you will not be charged for this service.\"    Patient has given verbal consent for Telephone visit?  Yes    What phone number would you like to be contacted at? [phone]    How would you like to obtain your AVS? Billie Johnson     Katy Islas is a 30 year old female who presents via phone visit today for the following health issues:    HPI  Hypertension Follow-up      Do you check your blood pressure regularly outside of the clinic? Yes     Are you following a low salt diet? No    Are your blood pressures ever more than 140 on the top number (systolic) OR more   than 90 on the bottom number (diastolic), for example 140/90? Yes    - Feeling well   - Missed yesterday's appointment, got a flat tire  - Checked BP this morning      Better - 144/93, pulse 93      Yesterday - 160/104, pulse was 68      Goes up and down      Normally about 115-130/70-80 pulse   - Cardiology called yesterday, they will only be doing a phone visit, made her mad   - " Checking at rest and trying to be calm      Also checking if lightheaded   - Doesn't add salt   - Bad at taking medication in AM but better at night   - Worried about diabetes   - Clonidine makes feel funny   - Abilify and counseling helping a lot with mood      Wellbutrin also helping   - Knee injections on both sides helping   - Joined gym, starting to do pool therapy with physical therapy       How many servings of fruits and vegetables do you eat daily?  2-3    On average, how many sweetened beverages do you drink each day (Examples: soda, juice, sweet tea, etc.  Do NOT count diet or artificially sweetened beverages)?   1    How many days per week do you exercise enough to make your heart beat faster? 7    How many minutes a day do you exercise enough to make your heart beat faster? 30 - 60    How many days per week do you miss taking your medication? A couple times per week in the morning      BP Readings from Last 6 Encounters:   06/11/20 132/86   06/07/20 (!) 149/98   06/03/20 116/84   05/13/20 124/83   05/02/20 114/63   04/30/20 125/87       Plan from last visit   - Improved after re-starting Losartan at 50 mg, just increased yesterday to 100 mg and BP today was even more improved at 144/86   - Recommend she continue at Losartan 100 mg, expect BPs to continue to improve   - Will hold on restarting Metoprolol for now   - Reviewed medication use and side effects  - Continue to monitor BP   - Recheck 2 weeks - plan next visit to discuss lab update and BP check in clinic   - Discussed monitoring of what sounds like orthostatic hypotension as happened right when started Losartan, due to rapid decrease of high BPs, continue to monitor this   - Discussed warning signs that would warrant return to clinic/ED  - Likely will no longer be an issue   - Discussed I do not think she needs a cardiology appointment but she was insistent on this, discussed could wait as well, patient still insistent, referral placed  "      Reviewed and updated as needed this visit by Provider  Allergies  Meds  Problems  Med Hx  Surg Hx         Review of Systems   Constitutional, HEENT, cardiovascular, pulmonary, gi and gu systems are negative, except as otherwise noted.       Objective   Reported vitals:  There were no vitals taken for this visit.   healthy, alert and no distress  PSYCH: Alert and oriented times 3; coherent speech, normal   rate and volume, able to articulate logical thoughts, able   to abstract reason, no tangential thoughts, no hallucinations   or delusions  Her affect is normal  RESP: No cough, no audible wheezing, able to talk in full sentences  Remainder of exam unable to be completed due to telephone visits    Diagnostic Test Results:  Labs reviewed in Epic  none         Assessment/Plan:    ICD-10-CM    1. Hypertension goal BP (blood pressure) < 130/80  I10 hydrochlorothiazide (HYDRODIURIL) 12.5 MG tablet   2. Bipolar disorder, current episode mixed, severe, without psychotic features (H)  F31.63    3. Mild major depression (H)  F32.0    4. Adjustment disorder with mixed anxiety and depressed mood  F43.23    5. Family history of diabetes mellitus  Z83.3 Hemoglobin A1c     1. Blood pressure   - Still above goal with Losartan 100 mg, pulses are normal so I do not recommend we restart Metoprolol   - Will instead add something that would help with her reported swelling   - Start hydrochlorothiazide - 1 tablet per day, discussed use and side effects      BP recheck in 1 week      Continue to monitor BP, goal <130/80   - Reviewed in depth etiology of HTN, patient is still insistent \"needs to know what is going on\" and will proceed with cardiology consult   - Discussed we have done labs to look for reversible causes of HTN, these were all normal, so likely etiology is weight and genetics   - We will need to get BMP in ~4 weeks to recheck with hydrochlorothiazide addition     2-4. Mood   - Stable, going to counseling, feels " things are getting better since she started exercising and riding her horse again     5. Mild elevation in BG's   - Patient insistent on getting A1C checked because of her weight and her dad had diabetes, discussed mild elevation in all the labs she gets frequently at ED and various other visits, this could be due to being not fasting   - A1C 1 year ago was 5.5, discussed this is normal     - Recheck with Zach in 2 weeks - every 2 weeks - Wednesday at 10 am     The patient indicates understanding of these issues and agrees with the plan.    Return in about 2 weeks (around 7/8/2020) for Recheck.    Phone call duration:  22 minutes    Zach Rosario PA-C  [facility]

## 2020-06-19 NOTE — TELEPHONE ENCOUNTER
Prior Authorization Approval    Authorization Effective Date: 3/21/2020  Authorization Expiration Date: 6/19/2021  Medication: diclofenac (VOLTAREN) 1 % topical gel-APPROVED  Approved Dose/Quantity:    Reference #:     Insurance Company: [employer] [phone] Fax [phone]  Expected CoPay:       CoPay Card Available:      Foundation Assistance Needed:    Which Pharmacy is filling the prescription (Not needed for infusion/clinic administered): [facility] [id number] [address]  Pharmacy Notified: Yes  Patient Notified: Yes  **Instructed pharmacy to notify patient when script is ready to /ship.**

## 2020-06-19 NOTE — TELEPHONE ENCOUNTER
Central Prior Authorization Team   Phone: [phone]    PA Initiation    Medication: diclofenac (VOLTAREN) 1 % topical gel  Insurance Company: [employer] - Phone [phone] Fax [phone]  Pharmacy Filling the Rx: [id number] [place] [facility] [place], [place] - [facility]  Filling Pharmacy Phone: [phone]  Filling Pharmacy Fax: [phone]  Start Date: 6/19/2020

## 2020-06-22 ENCOUNTER — NURSE TRIAGE (OUTPATIENT)
Dept: FAMILY MEDICINE | Facility: OTHER | Age: 30
End: 2020-06-22

## 2020-06-22 ENCOUNTER — TELEPHONE (OUTPATIENT)
Dept: FAMILY MEDICINE | Facility: OTHER | Age: 30
End: 2020-06-22

## 2020-06-22 NOTE — TELEPHONE ENCOUNTER
Reason for call:  Patient reporting a symptom    Symptom or request: /104, pulse 68    Duration (how long have symptoms been present): today    Have you been treated for this before? No    Additional comments: Patient is asking to be triaged.    Phone Number patient can be reached at:  Home number on file [phone] (home)    Best Time:  any    Can we leave a detailed message on this number:  YES    Call taken on 6/22/2020 at 9:25 AM by Genie Luo

## 2020-06-22 NOTE — TELEPHONE ENCOUNTER
"I spoke with the patient who states her blood pressure is going up and down, \"it is not being stable\". At least three times so far this morning she has checked it and sometimes it even says error.\" Pt states her feet are hurting and swelling again. Takes her medications when she wakes up and also at 6-7 at night. Pt is concerned because one of her readings was 160/104. She feels fine. Yesterday she was home cleaning and went grocery shopping. She states her pain is a lot better today. She does not know why her feet are swollen. Had injections in her knees last week and states she was able to walk and having less pain. She states she may need to get a new blood pressure cuff. Recommended that she not check her blood pressure as often unless she is not feeling well. She will discuss getting a new machine with CDL during the schedule appt on Wednesday. She also wants to have her hgbA1C checked, she concerned for diabetes. Discussed her last few glucose readings. She also said she would like to talk with a cardiologist regarding the swelling and will talk with CDL about this also on Wednesday. Encouraged her to callback with any additional concerns. Also praised her on being more active and happy she is feeling better. Pt had no additional questions or concerns at this time.     Claudine Jimenez, MSN, RN    "

## 2020-06-22 NOTE — TELEPHONE ENCOUNTER
Reason for call:  Patient reporting a symptom    Symptom or request: feet turning purple    Duration (how long have symptoms been present): today    Have you been treated for this before? No    Additional comments: Patient is asking to speak with triage right away.    Phone Number patient can be reached at:  Home number on file [phone] (home)    Best Time:  any    Can we leave a detailed message on this number:  YES    Call taken on 6/22/2020 at 3:47 PM by Genie Luo

## 2020-06-22 NOTE — TELEPHONE ENCOUNTER
Spoke with patient through  services. BP has been higher since last visit.  Right foot is changing color and turning purple.  It's more on the inside of the foot and the heel.  Left foot is a little purple. Thought maybe bruising but no injury known. Just noticed today. Did not look at her foot when she was talking to Claudine this am. Had similar concern one time a month ago but it went away right away.    Feet are still swollen like they were this am.  Hurts to walk.  She also has cramps in both legs: calf and feet: started month maybe 2 ago.  Cramp intensity goes up and down.     Overall feet fell better with elevation and the color improves slightly.     BP's at home have been 160/104 pulse 68      Advised to elevated feet when she is able.  Call back if symptoms worsen, will sent note to provider to review and advise.      Iggy Bernstein, RN, BSN        Additional Information    Negative: Followed an ankle or foot injury    Negative: Ankle pain is the main symptom    Negative: Entire foot is cool or blue in comparison to other foot    Negative: Purple or black skin on foot or toe    Negative: Red area or streak and fever    Negative: Swollen foot and fever    Negative: Patient sounds very sick or weak to the triager    Negative: SEVERE pain (e.g., excruciating, unable to do any normal activities)    Negative: Looks like a boil, infected sore, deep ulcer, or other infected rash (spreading redness, pus)    Negative: Swollen foot (EXCEPTIONs: localized bump from bunions, calluses, insect bite, sting)    Negative: Numbness in one foot (i.e., loss of sensation)    Foot pain    Negative: MILD pain (e.g., does not interfere with normal activities) and present > 7 days    Negative: Foot pain is a chronic symptom (recurrent or ongoing AND lasting > 4 weeks)    Negative: Caused by known bunions, plantar wart, or flat feet    Negative: MODERATE pain (e.g., interferes with normal activities, limping) and present >  3 days    Negative: Numbness or tingling in feet and new or increased    Negative: Pain in the big toe joint    Negative: Patient wants to be seen    Protocols used: FOOT PAIN-A-OH

## 2020-06-23 NOTE — TELEPHONE ENCOUNTER
I spoke with the pt who states she is doing better, feet feel better. She elevated her legs. She has not taken her blood pressure yet today. Her foot is not purple any longer. B/P this morning is 150/108 79. She will come to clinic today to have her B/P checked and bring her machine.    Claudine Jimenez, MSN, RN

## 2020-06-23 NOTE — TELEPHONE ENCOUNTER
Have patient come in today or tomorrow AM for a BP check and check her cuff to ours as well.     Nothing else acute, can wait to discuss at our appointment tomorrow.    Zach Rosario PA-C  [facility]

## 2020-06-24 ENCOUNTER — MYC MEDICAL ADVICE (OUTPATIENT)
Dept: FAMILY MEDICINE | Facility: OTHER | Age: 30
End: 2020-06-24

## 2020-06-24 ENCOUNTER — VIRTUAL VISIT (OUTPATIENT)
Dept: FAMILY MEDICINE | Facility: OTHER | Age: 30
End: 2020-06-24
Payer: MEDICARE

## 2020-06-24 DIAGNOSIS — F43.23 ADJUSTMENT DISORDER WITH MIXED ANXIETY AND DEPRESSED MOOD: ICD-10-CM

## 2020-06-24 DIAGNOSIS — B37.0 THRUSH: Primary | ICD-10-CM

## 2020-06-24 DIAGNOSIS — F32.0 MILD MAJOR DEPRESSION (H): ICD-10-CM

## 2020-06-24 DIAGNOSIS — I10 HYPERTENSION GOAL BP (BLOOD PRESSURE) < 130/80: Primary | ICD-10-CM

## 2020-06-24 DIAGNOSIS — Z83.3 FAMILY HISTORY OF DIABETES MELLITUS: ICD-10-CM

## 2020-06-24 DIAGNOSIS — F31.63 BIPOLAR DISORDER, CURRENT EPISODE MIXED, SEVERE, WITHOUT PSYCHOTIC FEATURES (H): ICD-10-CM

## 2020-06-24 PROCEDURE — 99443 ZZC PHYSICIAN TELEPHONE EVALUATION 21-30 MIN: CPT | Performed by: PHYSICIAN ASSISTANT

## 2020-06-24 RX ORDER — HYDROCHLOROTHIAZIDE 12.5 MG/1
12.5 TABLET ORAL DAILY
Qty: 30 TABLET | Refills: 1 | Status: SHIPPED | OUTPATIENT
Start: 2020-06-24 | End: 2020-07-23

## 2020-06-24 RX ORDER — NYSTATIN 100000/ML
500000 SUSPENSION, ORAL (FINAL DOSE FORM) ORAL 4 TIMES DAILY
Qty: 400 ML | Refills: 1 | Status: SHIPPED | OUTPATIENT
Start: 2020-06-24 | End: 2021-01-19

## 2020-06-24 NOTE — PATIENT INSTRUCTIONS
- Start hydrochlorothiazide - 1 tablet per day      BP recheck in 1 week      Continue to monitor BP, goal <130/80     - Recheck with Zach in 2 weeks - every 2 weeks - Wednesday at 10 am           Patient Education     Low-Salt Choices  Eating salt (sodium) can make your body retain too much water. Excess water makes your heart work harder. Canned, packaged, and frozen foods are easy to prepare. But they are often high in sodium. Here are some ideas for low-salt foods you can easily make yourself.    For breakfast    Fruit or 100% fruit juice    Whole-wheat bread or an English muffin. Look for sodium content on Nutrition Facts labels.    Low-fat milk or yogurt    Unsalted eggs    Shredded wheat    Corn tortillas    Unsalted steamed rice    Regular (not instant) hot cereal, made without salt  Stay away from:    Sausage, hatch, and ham    Flour tortillas    Packaged muffins, pancakes, and biscuits    Instant hot cereals    Cottage cheese    For lunch and dinner    Fresh fish, chicken, turkey, or meat--baked, broiled, or roasted without salt    Dry beans, cooked without salt    Tofu, stir-fried without salt    Unsalted fresh fruit and vegetables, or frozen or canned fruit and vegetables with no added salt  Stay away from:    Lunch or deli meat that is cured or smoked    Cheese    Tomato juice and ketchup    Canned vegetables, soups, and fish not labeled as no-salt-added or reduced sodium    Packaged gravies and sauces    Olives, pickles, and relish    Bottled salad dressings    For snacks and desserts    Yogurt    Unsalted, air-popped popcorn    Unsalted nuts or seeds  Stay away from:    Pies and cakes    Packaged dessert mixes    Pizza    Canned and packaged puddings    Pretzels, chips, crackers, and nuts--unless the label says unsalted    Date Last Reviewed: 6/1/2017 2000-2019 [employer]. [address] [place] [address], [place] [address]. All rights reserved. This information is not intended as a  substitute for professional medical care. Always follow your healthcare professional's instructions.           Patient Education     Low-Salt Diet  This diet removes foods that are high in salt. It also limits the amount of salt you use when cooking. It is most often used for people with high blood pressure, edema (fluid retention), and kidney, liver, or heart disease.  Table salt contains the mineral sodium. Your body needs sodium to work normally. But too much sodium can make your health problems worse. Your healthcare provider is recommending a low-salt (also called low-sodium) diet for you. Your total daily allowance of salt is 1,500 to 2,300 milligrams (mg). It is less than 1 teaspoon of table salt. This means you can have only about 500 to 700 mg of sodium at each meal. People with certain health problems should limit salt intake to the lower end of the recommended range.    When you cook, don t add much salt. If you can cook without using salt, even better. Don t add salt to your food at the table.  When shopping, read food labels. Salt is often called sodium on the label. Choose foods that are salt-free, low salt, or very low salt. Note that foods with reduced salt may not lower your salt intake enough.    Beans, potatoes, and pasta  Ok: Dry beans, split peas, lentils, potatoes, rice, macaroni, pasta, spaghetti without added salt  Avoid: Potato chips, tortilla chips, and similar products  Breads and cereals  Ok: Low-sodium breads, rolls, cereals, and cakes; low-salt crackers, matzo crackers  Avoid: Salted crackers, pretzels, popcorn, Danish toast, pancakes, muffins  Dairy  Ok: Milk, chocolate milk, hot chocolate mix, low-salt cheeses, and yogurt  Avoid: Processed cheese and cheese spreads; Roquefort, Camembert, and cottage cheese; buttermilk, instant breakfast drink  Desserts  Ok: Ice cream, frozen yogurt, juice bars, gelatin, cookies and pies, sugar, honey, jelly, hard candy  Avoid: Most pies, cakes and  cookies prepared or processed with salt; instant pudding  Drinks  Ok: Tea, coffee, fizzy (carbonated) drinks, juices  Avoid: Flavored coffees, electrolyte replacement drinks, sports drinks  Meats  Ok: All fresh meat, fish, poultry, low-salt tuna, eggs, egg substitute  Avoid: Smoked, pickled, brine-cured, or salted meats and fish. This includes hatch, chipped beef, corned beef, hot dogs, deli meats, ham, kosher meats, salt pork, sausage, canned tuna, salted codfish, smoked salmon, herring, sardines, or anchovies.  Seasonings and spices  Ok: Most seasonings are okay. Good substitutes for salt include: fresh herb blends, hot sauce, lemon, garlic, gonzalez, vinegar, dry mustard, parsley, cilantro, horseradish, tomato paste, regular margarine, mayonnaise, unsalted butter, cream cheese, vegetable oil, cream, low-salt salad dressing and gravy.  Avoid: Regular ketchup, relishes, pickles, soy sauce, teriyaki sauce, Worcestershire sauce, BBQ sauce, tartar sauce, meat tenderizer, chili sauce, regular gravy, regular salad dressing, salted butter  Soups  Ok: Low-salt soups and broths made with allowed foods  Avoid: Bouillon cubes, soups with smoked or salted meats, regular soup and broth  Vegetables  Ok: Most vegetables are okay; also low-salt tomato and vegetable juices  Avoid: Sauerkraut and other brine-soaked vegetables; pickles and other pickled vegetables; tomato juice, olives  Date Last Reviewed: 8/1/2016 2000-2019 [employer]. [address] [place] [address], [place] [address]. All rights reserved. This information is not intended as a substitute for professional medical care. Always follow your healthcare professional's instructions.

## 2020-06-25 ENCOUNTER — TELEPHONE (OUTPATIENT)
Dept: FAMILY MEDICINE | Facility: OTHER | Age: 30
End: 2020-06-25

## 2020-06-25 NOTE — TELEPHONE ENCOUNTER
"Spoke with patient.  About 15 minutes ago felt her heart racing.  She is feeling a throbbing sensation all over her body.  Feeling like she's having to take deeper breath. Was just doing laundry prior to the start of this episode.  No new soaps or lotions.      BP monitor was not working. Does not know how take pulse manually.  Instructed her to check the batteries and trouble shoot with manufacture.    Feels like she just finished working out but she didn't.  No pain anywhere. She also has a headache.  Did have a headache yesterday and vomited x 1 yesterday.    No vomiting today.   States she wants to see anyone in clinic today.    Huddled with CDL. \"recommend monitoring, could be medication or anxiety or etc. she needs to stick to our arrangement, she is not to see any other provider except me every 2 weeks\"    Patient informed. States she will follow CDL's plan.Will rest and return call with change in symptoms.  Also wanted CDL to know that that heart specialist called and she did schedule with them.     Iggy Bernstein, RN, BSN        "

## 2020-06-25 NOTE — TELEPHONE ENCOUNTER
Reason for Call:  Other BP Monitor    Detailed comments: Patient states her BP monitor is not working. Was not able to gather more information before sending to triage for difficulty breathing and heart palpitations     Phone Number Patient can be reached at: Home number on file [phone] (home)    Best Time: any    Can we leave a detailed message on this number? YES    Call taken on 6/25/2020 at 1:32 PM by Souleymane Garcia

## 2020-06-25 NOTE — TELEPHONE ENCOUNTER
Reason for call:  Patient reporting a symptom    Symptom or request: Out of breath, hard time catching a breath and heart palpitations    Duration (how long have symptoms been present): 1+day    Have you been treated for this before? Yes    Additional comments: Patient sent to triage nurse    Phone Number patient can be reached at:  Home number on file [phone] (home)    Best Time:  any    Can we leave a detailed message on this number:  YES    Call taken on 6/25/2020 at 1:30 PM by Souleymane Garcia

## 2020-06-26 ENCOUNTER — TRANSFERRED RECORDS (OUTPATIENT)
Dept: HEALTH INFORMATION MANAGEMENT | Facility: CLINIC | Age: 30
End: 2020-06-26

## 2020-06-26 LAB
ALT SERPL-CCNC: 34 U/L (ref 8–45)
AST SERPL-CCNC: 21 U/L (ref 5–41)
CREAT SERPL-MCNC: 0.98 MG/DL (ref 0.57–1.11)
GFR SERPL CREATININE-BSD FRML MDRD: >60 ML/MIN/1.73M2
GLUCOSE SERPL-MCNC: 95 MG/DL (ref 70–105)
POTASSIUM SERPL-SCNC: 3.9 MMOL/L (ref 3.5–5.1)

## 2020-06-28 ENCOUNTER — TRANSFERRED RECORDS (OUTPATIENT)
Dept: HEALTH INFORMATION MANAGEMENT | Facility: CLINIC | Age: 30
End: 2020-06-28

## 2020-06-29 ENCOUNTER — TRANSFERRED RECORDS (OUTPATIENT)
Dept: HEALTH INFORMATION MANAGEMENT | Facility: CLINIC | Age: 30
End: 2020-06-29

## 2020-06-29 LAB
C TRACH DNA SPEC QL PROBE+SIG AMP: NEGATIVE
CREAT SERPL-MCNC: 1.03 MG/DL (ref 0.57–1.11)
GFR SERPL CREATININE-BSD FRML MDRD: >60 ML/MIN/1.73M2
GLUCOSE SERPL-MCNC: 92 MG/DL (ref 70–105)
N GONORRHOEA DNA SPEC QL PROBE+SIG AMP: NEGATIVE
POTASSIUM SERPL-SCNC: 3.8 MMOL/L (ref 3.5–5.1)
SPECIMEN DESCRIP: NORMAL
SPECIMEN DESCRIPTION: NORMAL

## 2020-07-01 ENCOUNTER — TELEPHONE (OUTPATIENT)
Dept: FAMILY MEDICINE | Facility: OTHER | Age: 30
End: 2020-07-01

## 2020-07-01 NOTE — TELEPHONE ENCOUNTER
I spoke with the pt who states she just went to the pharmacy and got the medication for the pinworms. She will take that now. States everything else is good. CDL is aware of ED visit. Pt will come in tomorrow for B/P and labs    Claudine Jimenez, MSN, RN

## 2020-07-01 NOTE — TELEPHONE ENCOUNTER
Patient was discharged from [facility] ED on 6/29, copied/pasted from ED note:  'Diagnosis:  Final diagnoses:   [B80] Pinworms (Primary)     Plan: see below.     Discharge Instructions     If symptoms recur, you have a refill for the medication at the pharmacy, usually 1 dose is enough  If symptoms recur you can repeat the dose in 2 weeks  Talk with your vet if you want to see if you should treat your pets.         Medication List     New   mebendazole 100 mg Chew tab  Quantity: 1 tablet  Signed by: ZAYNAB Kate  Commonly known as: VERMOX  100 mg, oral, ONCE        Follow-up with:  No follow-up provider specified.  '      Claudine AGUILAR RN not in clinic until this afternoon.  Would recommend patient ask her pharmacist if there are med interactions.  Called patient to ask why she needs to be seen in clinic sooner than scheduled, left message to call back.    Marianna Layne, BSN, RN, PHN

## 2020-07-01 NOTE — TELEPHONE ENCOUNTER
Reason for Call:  Other call back and prescription    Detailed comments: Patient called clinic stating that she noticed her buttocks was red, and then she noticed worms in her buttocks and mouth. She went to the [facility] ED in [place] and was given an antibiotic to kill the pinworms.  Wants to know if there would be any interactions with the antibiotics for the pinworms (she did not provide name of rx) . She also is stating she may need to see CDL sooner than next week. Please follow up with patient.     Phone Number Patient can be reached at: Home number on file [phone] (home)    Best Time: any      Can we leave a detailed message on this number? YES    Call taken on 7/1/2020 at 7:41 AM by Genie Luo

## 2020-07-02 ENCOUNTER — TRANSFERRED RECORDS (OUTPATIENT)
Dept: HEALTH INFORMATION MANAGEMENT | Facility: CLINIC | Age: 30
End: 2020-07-02

## 2020-07-03 NOTE — PROGRESS NOTES
"Katy Islas is a 30 year old female who is being evaluated via a billable telephone visit.      The patient has been notified of following:     \"This telephone visit will be conducted via a call between you and your physician/provider. We have found that certain health care needs can be provided without the need for a physical exam.  This service lets us provide the care you need with a short phone conversation.  If a prescription is necessary we can send it directly to your pharmacy.  If lab work is needed we can place an order for that and you can then stop by our lab to have the test done at a later time.    Telephone visits are billed at different rates depending on your insurance coverage. During this emergency period, for some insurers they may be billed the same as an in-person visit.  Please reach out to your insurance provider with any questions.    If during the course of the call the physician/provider feels a telephone visit is not appropriate, you will not be charged for this service.\"    Patient has given verbal consent for Telephone visit?  Yes    What phone number would you like to be contacted at? [phone]    How would you like to obtain your AVS? MyChart    Subjective     Katy Islas is a 30 year old female who presents via phone visit today for the following health issues:    HPI     Pt has typed out a [employer] message for this visit. Please see her concerns below:    1.) Olga not been feeling well.  Bad headache, nausea / vomiting, eyes hurt, redness in nose and eyes- looks like tiny blood blisters inside my eyes.  I took maxalt and zofran, but didnt help.  It s been going on for a few months.  Will pinworms cause this?  Can further tests be done?  What do we do?  Can I be seen in person?  If so, I can borrow my neighbor s truck.  My car needs a new transmission...Olga been super stressed lately. Overwhelmed and too much going on.  - thinks it is a migraine  - Did essential oils from " [employer] tab and did help      Digest tab   - Maxalt and Zofran did not help   - Feeling better today    - Gone today   - Nap helped too     2.) My feet still hurt if I am on my feet too long.  I have hard bumps on my feet, slightly purple.  My legs cramp and hurt if I am active.  The 10mcg Buprenorphine patch is helping a lot though.  I am getting 7 day trial for neurostimulator soon .  If it helps, then they will do surgery to put in the neurostimulator implant. [facility] and really want me to have a pain free life, without using pain meds.  That is our goal.  My knees are feeling a lot better after getting injections in both knees!    3.) my blood pressure is good today- 130/87 but pulse is lower than normal; 59.  My pulse tends to be in the 90s.  I am taking my meds.  - Last night 132/70 last night, pulse 69  - Today 144/98 pulse 92 before medication    4.) What is the phone number for the cardiologist?  I got a message from them a while ago, but i just forget and I ve been super stressed with everything going on right now.  I apologize.  Do you think it might be congestive heart failure?  My father, grandfather and great grandfather all had heart attacks,  at a early age.  I do not want to be one of them.    5.) I was in the ER in [facility] last week- they thought an abscess again, but cat scan did not show anything....I got two antibiotics for infection of the vulva, then the next day, I went back, because I found worms.  They gave me a chewable pill to kill the worms.  I think a second dose in two weeks should be done.  I have a refill for one more pill.  It is VERY EXPENSIVE though!  It was $222.00 with insurance. My god.  Is there another medication that is cheaper, and will kill worms/parasites?      BP Readings from Last 3 Encounters:   20 132/86   20 (!) 149/98   20 116/84    Wt Readings from Last 3 Encounters:   20 86.2 kg (190 lb)    05/13/20 86.2 kg (190 lb)   05/04/20 86.9 kg (191 lb 9.3 oz)              Reviewed and updated as needed this visit by Provider  Allergies  Meds  Problems  Med Hx  Surg Hx         Review of Systems   Constitutional, HEENT, cardiovascular, pulmonary, gi and gu systems are negative, except as otherwise noted.       Objective   Reported vitals:  There were no vitals taken for this visit.   healthy, alert and no distress  PSYCH: Alert and oriented times 3; coherent speech, normal   rate and volume, able to articulate logical thoughts, able   to abstract reason, no tangential thoughts, no hallucinations   or delusions  Her affect is normal  RESP: No cough, no audible wheezing, able to talk in full sentences  Remainder of exam unable to be completed due to telephone visits    Diagnostic Test Results:  Labs reviewed in Epic  none         Assessment/Plan:    ICD-10-CM    1. Pinworms  B80    2. Hypertension goal BP (blood pressure) < 130/80  I10    3. Screening for lipid disorders  Z13.220 Lipid panel reflex to direct LDL Fasting   4. Migraine without aura and without status migrainosus, not intractable  G43.009 prochlorperazine (COMPAZINE) 10 MG tablet   5. Crohn's disease of colon with fistula (H)  K50.113 NUTRITION REFERRAL     1. HTN   - Marked improvement with new regimen   - Will need to update BMP, also still needs to schedule lab to get A1C   - Continue medications and monitoring     2. Migraine   - Now improved, reviewed medication options with patient, she did not realize that she could take another dose of Maxalt 2 hours later (did take 2 tablets), also if Zofran doesn't work, also has Compazine, she had forgotten about this   - Reviewed all use and side effects, refilled     3. Crohn's  - Wants referral to nutrition, this was given per orders from her GI doctor     4. Pinworms   - Discussed medication use and side effects and when to take 2nd dose (2 weeks from 1st)   - Discussed likely causing a lot of  her symptoms     5. Continues to work with [employer] and mental health specialists, reporting things are going well     The patient indicates understanding of these issues and agrees with the plan.    The patient indicates understanding of these issues and agrees with the plan.    Return in about 2 weeks (around 7/22/2020) for Recheck.    Phone call duration: 23 minutes and 20 seconds    Zach Rosario PA-C  [facility]

## 2020-07-07 ENCOUNTER — TELEPHONE (OUTPATIENT)
Dept: FAMILY MEDICINE | Facility: OTHER | Age: 30
End: 2020-07-07

## 2020-07-07 ENCOUNTER — MYC MEDICAL ADVICE (OUTPATIENT)
Dept: FAMILY MEDICINE | Facility: OTHER | Age: 30
End: 2020-07-07

## 2020-07-07 NOTE — TELEPHONE ENCOUNTER
"Patient called with her  and states that she is wondering when she should reschedule her surgery. She is wondering when she needs a depo shot and she was seen in the ER and has a \"vulva infection\" she is wondering if she should have a pelvic exam because she is still having problems. She brings up a lot of different issues so maybe a phone call to her would be best?  "

## 2020-07-07 NOTE — TELEPHONE ENCOUNTER
Patients wants to speak to nurse regarding headache, runny nose, swollen eyes blood shot. And she said she found a worm in her nose  Please advise.

## 2020-07-07 NOTE — TELEPHONE ENCOUNTER
I spoke with pt who states she has been vomiting. Has a bad headache. Red under her eyes. Some blisters. Thinks she saw a worm in her nose. Did take medication last week for pinworms. Just started to feel bad again this afternoon. States the blisters are in her eyes, when she pulls her eyelid down she can see them. Can also see them in the back of throat and in her nose. Vomited 4 times today, not eating. Decreased urination. Feels dizzy and lightheaded sometimes. She did try some tums but makes it worse. Has not eaten for 3 days. She is also having some diarrhea. She states she is frustrated, fed-up. Discussed home cares for vomiting and diarrhea she states she has already been doing that. Pt has appt with CDL already scheduled for tomorrow. States she does not have a car and is not able to go anywhere like the ED or to the clinic. She got really upset and the call was dropped. Will route to CDL as FYI for tomorrow appt.     Claudine Jimenez, MSN, RN

## 2020-07-08 ENCOUNTER — VIRTUAL VISIT (OUTPATIENT)
Dept: FAMILY MEDICINE | Facility: OTHER | Age: 30
End: 2020-07-08
Payer: MEDICARE

## 2020-07-08 ENCOUNTER — TELEPHONE (OUTPATIENT)
Dept: FAMILY MEDICINE | Facility: OTHER | Age: 30
End: 2020-07-08

## 2020-07-08 ENCOUNTER — TRANSFERRED RECORDS (OUTPATIENT)
Dept: HEALTH INFORMATION MANAGEMENT | Facility: CLINIC | Age: 30
End: 2020-07-08

## 2020-07-08 DIAGNOSIS — G43.009 MIGRAINE WITHOUT AURA AND WITHOUT STATUS MIGRAINOSUS, NOT INTRACTABLE: ICD-10-CM

## 2020-07-08 DIAGNOSIS — I10 HYPERTENSION GOAL BP (BLOOD PRESSURE) < 130/80: ICD-10-CM

## 2020-07-08 DIAGNOSIS — Z13.220 SCREENING FOR LIPID DISORDERS: ICD-10-CM

## 2020-07-08 DIAGNOSIS — K50.113 CROHN'S DISEASE OF COLON WITH FISTULA (H): ICD-10-CM

## 2020-07-08 DIAGNOSIS — B80 PINWORMS: Primary | ICD-10-CM

## 2020-07-08 PROCEDURE — 99443 ZZC PHYSICIAN TELEPHONE EVALUATION 21-30 MIN: CPT | Performed by: PHYSICIAN ASSISTANT

## 2020-07-08 RX ORDER — PROCHLORPERAZINE MALEATE 10 MG
10 TABLET ORAL EVERY 6 HOURS PRN
Qty: 60 TABLET | Refills: 11 | Status: SHIPPED | OUTPATIENT
Start: 2020-07-08 | End: 2021-07-21 | Stop reason: ALTCHOICE

## 2020-07-08 NOTE — PATIENT INSTRUCTIONS
Cardiology - [phone]    Nutrition - [facility] [phone]

## 2020-07-08 NOTE — TELEPHONE ENCOUNTER
I spoke with pt who states she is not sure if she should take another dose of that medication for the pinworms because she is coughing them out. She can see them in her hands and they are coming out of her nasal passages with blood. Feels dizzy. She is sweating, has not been outside. Huddled with CDL pt should go to the ED. She will go to [facility] ED in [place].    Claudine Jimenez, MSN, RN

## 2020-07-08 NOTE — TELEPHONE ENCOUNTER
The following [employer] message was sent to the patient:    Preston Burns,   After reviewing your questions, it is recommended that you set up a virtual visit with Dr. Rosario.  You can call [phone] to schedule this.  Please let me know if you need anything further.   Thank you,   Louise Farmer, CMA

## 2020-07-09 ENCOUNTER — TELEPHONE (OUTPATIENT)
Dept: FAMILY MEDICINE | Facility: OTHER | Age: 30
End: 2020-07-09

## 2020-07-09 NOTE — TELEPHONE ENCOUNTER
I spoke with the pt who just woke up from a nap. Feeling better. Has a plan with the ED. Mom is coming tomorrow also, not sure how long her mom is staying, she thinks one week. She is wondering if she should F/U in 3 weeks. She was looking and she thinks she sees 2 holes now and her rectal area is sore. She will follow-up with GI. Reviewed discharge instructions. No questions at this time.    Claudine Jimenez, MSN, RN

## 2020-07-09 NOTE — TELEPHONE ENCOUNTER
Reason for Call:  Follow up from the ER    Detailed comments: Patient is calling in today to notify the doctor that she was in the ER on 07/08/20 and they told her to take medication for the pin worms. They stated that she needs to take 2 more doses. Patient was told to follow up in 3 days in person. Patient is wondering who she should see or what she should do?    Phone Number Patient can be reached at: Home number on file [phone] (home)    Best Time: anytime    Can we leave a detailed message on this number? YES    Call taken on 7/9/2020 at 10:10 AM by Loida Hillman

## 2020-07-09 NOTE — TELEPHONE ENCOUNTER
Reviewed note from ED 7/8/20: Follow-up with primary care provider and return to emergency department as needed. Cost of the prescription of Albendazole is approximately $75/dose. Fill the medicine on the 13th and 27th of July.      LM asking pt to return call. Please transfer to MAUREEN Chow MSN, RN

## 2020-07-10 ENCOUNTER — HOSPITAL ENCOUNTER (EMERGENCY)
Facility: CLINIC | Age: 30
Discharge: HOME | End: 2020-07-10
Attending: EMERGENCY MEDICINE | Admitting: EMERGENCY MEDICINE
Payer: MEDICARE

## 2020-07-10 ENCOUNTER — TELEPHONE (OUTPATIENT)
Dept: FAMILY MEDICINE | Facility: OTHER | Age: 30
End: 2020-07-10

## 2020-07-10 VITALS
WEIGHT: 198.19 LBS | SYSTOLIC BLOOD PRESSURE: 143 MMHG | OXYGEN SATURATION: 98 % | HEIGHT: 62 IN | DIASTOLIC BLOOD PRESSURE: 79 MMHG | TEMPERATURE: 98.1 F | HEART RATE: 108 BPM | RESPIRATION RATE: 18 BRPM | BODY MASS INDEX: 36.47 KG/M2

## 2020-07-10 DIAGNOSIS — R11.0 NAUSEA: ICD-10-CM

## 2020-07-10 PROCEDURE — 99283 EMERGENCY DEPT VISIT LOW MDM: CPT | Performed by: EMERGENCY MEDICINE

## 2020-07-10 PROCEDURE — 99283 EMERGENCY DEPT VISIT LOW MDM: CPT | Mod: Z6 | Performed by: EMERGENCY MEDICINE

## 2020-07-10 PROCEDURE — 87177 OVA AND PARASITES SMEARS: CPT | Performed by: EMERGENCY MEDICINE

## 2020-07-10 PROCEDURE — 87209 SMEAR COMPLEX STAIN: CPT | Performed by: EMERGENCY MEDICINE

## 2020-07-10 RX ORDER — ONDANSETRON 2 MG/ML
4 INJECTION INTRAMUSCULAR; INTRAVENOUS ONCE
Status: DISCONTINUED | OUTPATIENT
Start: 2020-07-10 | End: 2020-07-11 | Stop reason: HOSPADM

## 2020-07-10 ASSESSMENT — MIFFLIN-ST. JEOR: SCORE: 1567.25

## 2020-07-10 NOTE — ED TRIAGE NOTES
Oncology/Hematology Follow Up Note:    Assessment and Plan:  Marie Kline is a 86 year old female who was admitted on 7/15/2019.         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     Code Status: DNR/DNI    Subjective:    Feeling better, ready to be discharged in the next 1 to 2 days.  Does have weakness.  Does cough.  Aware that IVIG would be delayed      Labs:  All labs reviewed    CBC  Recent Labs   Lab 07/22/19  0615 07/21/19  0634 07/20/19  2159  07/20/19  0629   WBC 43.4* 40.0*  --   --  65.2*   HGB 8.2* 7.4* 7.5*   < > 7.1*   MCV 93 91  --   --  90    115*  --   --  102*    < > = values in this interval not displayed.       CMP  Recent Labs   Lab 07/22/19  0615 07/21/19  0634 07/20/19  1205 07/20/19  0629  07/19/19  0548  07/18/19  1401    138 137 136   < > Canceled, Test credited  123*   < >  --    POTASSIUM 4.8 4.7  --  4.2  --  4.9  --   --    CHLORIDE 102 107  --  104  --  91*  --   --    CO2 32 28  --  29  --  28  --   --    ANIONGAP 2* 3  --  3  --  4  --   --    * 106*  --  124*  --  122*  --   --    BUN 13 9  --  9  --  7  --   --    CR 0.58 0.54  --  0.58  --  0.54  --   --    GFRESTIMATED 83 85  --  83  --  85  --   --    GFRESTBLACK >90 >90  --  >90  --   Pt presents ambulatory to triage from home with mother. Pt states presenting here because of recent dx with pins worms, took tx, but not improving,. Pt also concerned for rectal abscess. Pt has abd pain, nausea and rectal bleeding. Bleeding bright red. Has hx rectal abscess, surgery 4/26/20.   >90  --   --    CARLOS 8.9 8.4*  --  8.7  --  8.5  --   --    PROTTOTAL  --  4.7*  --  5.3*  --   --   --   --    ALBUMIN  --  1.7*  --  1.9*  --   --   --   --    BILITOTAL  --  0.5  --  0.6  --   --   --  1.3   ALKPHOS  --  87  --  86  --   --   --   --    AST  --  59*  --  14  --   --   --   --    ALT  --  34  --  14  --   --   --   --     < > = values in this interval not displayed.       INRNo lab results found in last 7 days.    Blood Culture  Recent Labs   Lab 07/19/19  0645 07/19/19  0635   CULT No growth after 3 days No growth after 3 days         Isabel Ivory MD  [facility]  7/22/2019 9:36 AM

## 2020-07-10 NOTE — TELEPHONE ENCOUNTER
Reason for call:  Patient reporting a symptom    Symptom or request: Pinworms    Duration (how long have symptoms been present): ongoing    Have you been treated for this before? Yes    Additional comments: Patient has pinworms in nose and side of face is swelling and cannot see out of eye    Phone Number patient can be reached at:  Home number on file [phone] (home)    Best Time:  any    Can we leave a detailed message on this number:  YES    Call taken on 7/10/2020 at 11:26 AM by Genie Luo

## 2020-07-11 NOTE — ED NOTES
Patient states she does not want a hospital provided . Mom at bedside and patient requests that she interpret for this visit. All assessments done via mother interpreting.

## 2020-07-11 NOTE — ED NOTES
"Staff RN attempted to insert an ultrasound IV without success due to the patient moving during insertion and not allowing the RN to use certain veins. When this RN entered the other RN was looking for another site. Patient got angry asking \"Do I have to have an IV?\" It was explained to the patient that the doctor ordered an IV antiemetic, but that we could have her switch to a PO medication and just do a lab draw. Patient asked if the doctor could give her IV pain medication and this RN stated that the doctor was informed and at this time had not placed any orders for pain medication. The patient got visibly angry, verbally yelled stating that she wanted to just leave and throwing her BP cuff, Pulse Ox, and Call light off onto the floor. Mother was trying to calm her and this RN told the patient that her behavior was inappropriate, her mother interpreting, and that when she has calmed down to put her call light on and we will come draw blood and bring an oral antiemetic for her.   "

## 2020-07-11 NOTE — ED NOTES
On the way walking to the bathroom, the patient asked this RN to ask the doctor if she could have IV pain medication for her abdominal pain. MD was notified.

## 2020-07-11 NOTE — ED NOTES
A staff RN observed the patient dressed walking with her mother out the door to the lobby as another patient was leaving and notified this RN. This RN went to the patient's room and observed that the patient had eloped.

## 2020-07-11 NOTE — ED PROVIDER NOTES
"ED Provider Note  [facility]      History     Chief Complaint   Patient presents with     Nausea     pin worms     Rectal Bleeding     HPI  Katy Islas is a 30 year old female with a past medical history significant for Crohn's disease, anorectal fistula, grand mal seizure disorder who is s/p parietal fistulotomy, anxiety, depression, borderline personality disorder, deafness, fibromyalgia who presents to the ED today with nausea and rectal bleeding.     Patient has multiple complaints.  First complaint is she believes her pinworms have not been properly treated.  She is concerned that she has known pinworms coming out and has seen them coming out of her nose, and her gums, and also rectally.  She has ongoing rectal itching.  She is also concerned that her rectal abscess is back as she has some pain in her rectum.  She is also complaining of nausea.  She also notes vaginal discharge for 1 month.  No fevers, alexus abdominal pain, this does not feel like a Crohn's flare to her.  She \"occasionally\" notes blood per rectum.     Per chart review the patient was seen in the emergency department for pinworms, had another visit for vaginal discharge, and visits for concern for rectal abscess.  She had negative abdominal CT scan and normal vaginal swabs at that time.  Patient had eloped from the ER with previous visits secondary to concerns for pain management.    Past Medical History  Past Medical History:   Diagnosis Date     Abdominal pain 10/31- 11/4/2005    [facility] admit for Crohn's     Allergic rhinitis, cause unspecified     Allergic rhinitis     Arthritis      C. difficile diarrhea     Past, no current diarrhea.     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, without mention of intractable epilepsy     pseudoseizures diagnosed after extensive neurologic eval     Migraine 07/21/12    D/C 07/22/12-[facility]     Migraine, unspecified, without mention of intractable migraine without mention of status migrainosus     Migraine     Mild intermittent asthma     mild intermittent     Mycoplasma infection in conditions classified elsewhere and of unspecified site      Other chronic pain     Back pain for 6 years     Renal disease     Kidney stones     Unspecified hearing loss     congenital hearing loss     Past Surgical History:   Procedure Laterality Date     AS REMOVAL OF COCCYX  10/18/2017     C FUSION OF SACROILIAC JOINT  10/26/2018     COLONOSCOPY  7/1/2009    [facility] [place]     COLONOSCOPY N/A 7/17/2019    Procedure: Colonoscopy, With Polypectomy And Biopsy;  Surgeon: Edwin Conde MD;  Location: [facility] OR     COLONOSCOPY WITH CO2 INSUFFLATION N/A 7/17/2019    Procedure: COLONOSCOPY, WITH CO2 INSUFFLATION;  Surgeon: Edwin Conde MD;  Location: [facility] OR     COMBINED ESOPHAGOSCOPY, GASTROSCOPY, DUODENOSCOPY (EGD) WITH CO2 INSUFFLATION N/A 4/12/2019    Procedure: COMBINED ESOPHAGOSCOPY, GASTROSCOPY, DUODENOSCOPY (EGD) WITH CO2 INSUFFLATION;  Surgeon: Edwin Conde MD;  Location: [facility] OR     ESOPHAGOSCOPY, GASTROSCOPY, DUODENOSCOPY (EGD), COMBINED N/A 4/12/2019    Procedure: Combined Esophagoscopy, Gastroscopy, Duodenoscopy (Egd), Biopsy Single Or Multiple;  Surgeon: Edwin Conde MD;  Location: [facility] OR     FISTULOTOMY RECTUM N/A 4/30/2020    Procedure: EXAM UNDER ANESTHESIA, ANUS, parital fistulotomy;  Surgeon: Valentin Sanchez MD;  Location: [facility] OR     FUSION SPINE  POSTERIOR MINIMALLY INVASIVE ONE LEVEL N/A 2/23/2017    Procedure: FUSION SPINE POSTERIOR MINIMALLY INVASIVE ONE LEVEL;  L4-5 Oblique Lateral Lumbar Interbody Fusion   Epidural steroid injection.   Transpedicular Bone marrow aspiration;  Surgeon: Jeniffer Eugene MD;  Location: [facility] OR     HC COLONOSCOPY THRU STOMA WITH BIOPSY  10/29/2003    Impression is that of normal appearing colonoscopy, without evidence of rectal bleeding.     HC COLONOSCOPY THRU STOMA, DIAGNOSTIC  10/00    normal     HC COLONOSCOPY THRU STOMA, DIAGNOSTIC  Oct 2009    Dr López- normal     HC EEG AWAKE AND SLEEP      abnormal     HC MRI BRAIN W/O CONTRAST  12/00    normal     HC REMOVAL GALLBLADDER  8/5/2009    [facility]     HC UGI ENDOSCOPY DIAG W BIOPSY  11/11/09    Normal esophagus     HC UGI ENDOSCOPY, SIMPLE EXAM  7/00, 10/00    mild chronic esophagitis and duodenitis, neg H pylori     HC UGI ENDOSCOPY, SIMPLE EXAM  01/20/2005    Esophagogastroduodenoscopy, colonoscopy with biopsies.  [facility] UGI ENDOSCOPY, SIMPLE EXAM  7/1/2009    [facility], [facility].      UGI ENDOSCOPY, SIMPLE EXAM  11/11/2009    attempted upper GI, pt. could not tolerate procedure:[facility]     ORTHOPEDIC SURGERY  October 19,2011    diskectomy L4-L5     albuterol (PROAIR HFA/PROVENTIL HFA/VENTOLIN HFA) 108 (90 Base) MCG/ACT inhaler  albuterol (PROVENTIL) (2.5 MG/3ML) 0.083% neb solution  alum & mag hydroxide-simethicone (MAALOX ADVANCED MAX ST) 400-400-40 MG/5ML SUSP suspension  ARIPiprazole (ABILIFY) 30 MG tablet  aspirin (ASA) 81 MG tablet  azelastine (OPTIVAR) 0.05 % ophthalmic solution  buprenorphine (BUTRANS) 10 MCG/HR WK patch  buPROPion (WELLBUTRIN XL) 150 MG 24 hr tablet  busPIRone (BUSPAR) 15 MG tablet  cyclobenzaprine (FLEXERIL) 10 MG tablet  diclofenac (VOLTAREN) 1 % topical gel  diphenhydrAMINE (BENADRYL) 50 MG capsule  DULoxetine (CYMBALTA) 60 MG EC capsule  EPINEPHrine (ANY BX  GENERIC EQUIV) 0.3 MG/0.3ML injection 2-pack  fexofenadine (ALLEGRA) 180 MG tablet  fluticasone (FLONASE) 50 MCG/ACT nasal spray  gabapentin (NEURONTIN) 300 MG capsule  hydrochlorothiazide (HYDRODIURIL) 12.5 MG tablet  HYDROmorphone (DILAUDID) 2 MG tablet  lidocaine (XYLOCAINE) 2 % solution  losartan (COZAAR) 100 MG tablet  medical cannabis (Patient's own supply)  medroxyPROGESTERone (DEPO-PROVERA) 150 MG/ML IM injection  methocarbamol (ROBAXIN) 750 MG tablet  NONFORMULARY  nystatin (MYCOSTATIN) 141910 UNIT/ML suspension  ondansetron (ZOFRAN-ODT) 8 MG ODT tab  oxyCODONE-acetaminophen (PERCOCET) 5-325 MG tablet  pantoprazole (PROTONIX) 40 MG EC tablet  polyethylene glycol (MIRALAX) 17 g packet  prazosin (MINIPRESS) 1 MG capsule  prochlorperazine (COMPAZINE) 10 MG tablet  rizatriptan (MAXALT-MLT) 5 MG ODT  sucralfate (CARAFATE) 1 GM tablet  valACYclovir (VALTREX) 1000 mg tablet  acetaminophen (TYLENOL) 500 MG tablet      Allergies   Allergen Reactions     Iohexol Hives     Other reaction(s): Hives  IV contrast; patient states she tolerates contrast if she gets benadryl before  IV contrast; patient states she tolerates contrast if she gets benadryl before     Ativan [Lorazepam] Hives     At the IV site     Baclofen      hives     Bees Hives, Swelling and Difficulty breathing     Caffeine      Contrast Dye      Hives,   Updated 5/10/2016 CT Contrast.     Dilaudid [Hydromorphone] Itching     Iodine Hives     Methocarbamol Swelling     Metoclopramide      Other reaction(s): Tremors  LW Reaction: shaking/sweating     Midazolam      Other reaction(s): Agitation     Monosodium Glutamate      Morphine Other (See Comments)     Difficulty with urination     Nsaids Other (See Comments)     GI BLEED x2     Other (Do Not Use) Other (See Comments)     Xanaflex- pt becomes disoriented and loses bladder control     Reglan [Metoclopramide Hcl] Other (See Comments)     shaking     Soma [Carisoprodol] Visual Disturbance     Sleep  walking     Tizanidine      Topamax Other (See Comments)     Topamax [Topiramate] Nausea     Tingling  GI/Vomit     Tramadol      Severe Headache, Seizure Risk     Tylenol W/Codeine [Acetaminophen-Codeine] Nausea and Itching     Tylenol 3     Versed Other (See Comments)     Zolmitriptan      Makes face feel like its twitching     Droperidol Anxiety     Flu Virus Vaccine Rash      Arm swelling     Past medical history, past surgical history, medications, and allergies were reviewed with the patient. Additional pertinent items: None    Family History  Family History   Problem Relation Age of Onset     Gastrointestinal Disease Brother         severe Crohn's     Neurologic Disorder Brother         Seizures post head injury     Depression Brother      Substance Abuse Brother      Genitourinary Problems Father         kidney stones     Diabetes Father      Heart Disease Father         Open heart surgery     Breast Cancer Maternal Grandmother      Parkinsonism Maternal Grandmother      Cerebrovascular Disease Paternal Grandmother      Cancer Maternal Grandfather         Lung     Cardiovascular Paternal Grandfather         Heart Attack     Substance Abuse Mother         in recovery x1 year      Lung Cancer Paternal Uncle      Cancer Paternal Uncle      Lung Cancer Maternal Uncle      Family history was reviewed with the patient. Additional pertinent items: None    Social History  Social History     Tobacco Use     Smoking status: Current Every Day Smoker     Packs/day: 0.25     Years: 5.00     Pack years: 1.25     Types: Cigarettes     Smokeless tobacco: Never Used   Substance Use Topics     Alcohol use: Not Currently     Comment: Not since last July 2018     Drug use: Yes     Types: Marijuana     Comment: Medical Marijuana currently-- More CBD      Social history was reviewed with the patient. Additional pertinent items: None    Review of Systems  A complete review of systems was performed with pertinent positives and  "negatives noted in the HPI, and all other systems negative.    Physical Exam   BP: 124/77  Pulse: 104  Temp: 98.1  F (36.7  C)  Resp: 18  Height: 157.5 cm (5' 2\")  Weight: 89.9 kg (198 lb 3.1 oz)  SpO2: 96 %  Physical Exam  General: awake, alert, NAD  Head: normal cephalic  HEENT: pupils equal, conjugate gaze in tact.  Normal nares bilaterally.  Posterior oropharynx is normal-appearing moist mucous membranes.  Neck: Supple  CV: regular rate and rhythm   Lungs: Breathing comfortably on room air  Abd: soft, non-tender, no guarding, no peritoneal signs  Rectal exam: No masses, tenderness, fluctuance appreciated.  No bright red blood from the rectal vault.  EXT: lower extremities without swelling or edema  Neuro: awake, answers questions appropriately. No focal deficits noted       ED Course      Procedures           Assessments & Plan (with Medical Decision Making)   Katy is a 30-year-old female complicated past medical history who presents with multiple complaints.  I did review her chart she has been seen in several ERs and has been diagnosed with pinworms and was given appropriate treatment.   She did leave a stool sample, will send for O&P.  Initial plan was to obtain basic labs.  Per chart review she is already had a evaluation for her vaginal discharge with negative evaluation, had a negative CT scan 2 weeks ago for similar complaints, no obvious abnormalities noted on rectal exam so have low suspicion for a rectal abscess at this time.  While awaiting IV access patient eloped from the emergency department.  No labs were obtained prior to elopement aside from the stool sample.    I have reviewed the nursing notes. I have reviewed the findings, diagnosis, plan and need for follow up with the patient.    Discharge Medication List as of 7/10/2020 10:21 PM          Final diagnoses:   Nausea   I, Erum Lopez, am serving as a trained medical scribe to document services personally performed by Sandoval Batista MD, " based on the provider's statements to me.     I, Sandoval Albert MD, was physically present and have reviewed and verified the accuracy of this note documented by Erum Lopez.      --  Sandoval Albert MD  Emergency Medicine   [facility], [place], EMERGENCY DEPARTMENT  7/10/2020     Sandoval Albert MD  07/11/20 0037

## 2020-07-13 ENCOUNTER — TELEPHONE (OUTPATIENT)
Dept: FAMILY MEDICINE | Facility: OTHER | Age: 30
End: 2020-07-13

## 2020-07-13 ENCOUNTER — TELEPHONE (OUTPATIENT)
Dept: INFECTIOUS DISEASES | Facility: CLINIC | Age: 30
End: 2020-07-13

## 2020-07-13 ENCOUNTER — ALLIED HEALTH/NURSE VISIT (OUTPATIENT)
Dept: AUDIOLOGY | Facility: OTHER | Age: 30
End: 2020-07-13
Payer: MEDICARE

## 2020-07-13 DIAGNOSIS — H90.3 SENSORINEURAL HEARING LOSS, BILATERAL: Primary | ICD-10-CM

## 2020-07-13 DIAGNOSIS — B83.9 WORMS IN STOOL: Primary | ICD-10-CM

## 2020-07-13 LAB
O+P STL MICRO: NORMAL
O+P STL MICRO: NORMAL
SPECIMEN SOURCE: NORMAL

## 2020-07-13 PROCEDURE — V5299 HEARING SERVICE: HCPCS | Performed by: AUDIOLOGIST

## 2020-07-13 PROCEDURE — 99207 ZZC NO CHARGE LOS: CPT | Performed by: AUDIOLOGIST

## 2020-07-13 NOTE — TELEPHONE ENCOUNTER
[facility] Call Center    Phone Message    May a detailed message be left on voicemail: yes     Reason for Call:     Pt requesting call back or [employer] message regarding fecal lab result taken on 07/10/20 for parasite. Pt has an appt with ID clinic on 07/15/20.         Action Taken: Message routed to:  [facility]): id    Travel Screening: Not Applicable

## 2020-07-13 NOTE — TELEPHONE ENCOUNTER
Reason for Call: Request for an order or referral:    Order or referral being requested: Infectious Disease    Date needed: as soon as possible    Has the patient been seen by the PCP for this problem? NO    Additional comments: Mother called and said that pt was in the ER and they found a worm inside her and they said she needs to F/U with infectious disease but when the mother called they said she needs a referral to be seen by her primary. Please Advise thank you    Phone number Patient can be reached at: Mother cell [phone]    Best Time:  anytime    Can we leave a detailed message on this number?  YES    Call taken on 7/13/2020 at 10:50 AM by Yahaira Watson

## 2020-07-13 NOTE — TELEPHONE ENCOUNTER
RECORDS RECEIVED FROM: Internal - Worms in stool   DATE RECEIVED: 07.15.2020   NOTES (Gather within 2 years) STATUS DETAILS   OFFICE NOTE from referring provider   Internal 07.13.2020 Wilson Ibarra PA-C [facility]   OFFICE NOTE from other specialist N/A    DISCHARGE SUMMARY from hospital N/A    DISCHARGE REPORT from the ER N/A 07.13.2020   LABS (any labs) Internal /CE    MEDICATION LIST Internal / CE    IMAGING  (NEED IMAGES AND REPORTS)     Osteomyelitis: Foot imaging  N/A    Liver Abscess: Abdominal imaging N/A    Other (anything related to diagnoses N/A

## 2020-07-14 ENCOUNTER — TRANSFERRED RECORDS (OUTPATIENT)
Dept: HEALTH INFORMATION MANAGEMENT | Facility: CLINIC | Age: 30
End: 2020-07-14

## 2020-07-14 LAB — PHQ9 SCORE: 2

## 2020-07-15 ENCOUNTER — PRE VISIT (OUTPATIENT)
Dept: INFECTIOUS DISEASES | Facility: CLINIC | Age: 30
End: 2020-07-15

## 2020-07-15 ENCOUNTER — VIRTUAL VISIT (OUTPATIENT)
Dept: INFECTIOUS DISEASES | Facility: CLINIC | Age: 30
End: 2020-07-15
Attending: PHYSICIAN ASSISTANT
Payer: MEDICARE

## 2020-07-15 VITALS — HEIGHT: 61 IN | BODY MASS INDEX: 35.87 KG/M2 | WEIGHT: 190 LBS

## 2020-07-15 DIAGNOSIS — F22 DELUSIONS OF PARASITOSIS (H): Primary | ICD-10-CM

## 2020-07-15 ASSESSMENT — MIFFLIN-ST. JEOR: SCORE: 1514.21

## 2020-07-15 NOTE — PROGRESS NOTES
[facility]  New Patient Visit  7/15/2020     Chief Complaint:      HPI:  Katy Islas is a 30 year old deaf female with a history of Crohns disease not currently on treatment. In March she underwent I&D of a perianal abscess, with some concern that a fistula could be present. She also has history of depression/anxiety, borderline personality disorder, and frequent ED visit for pain complaints. An Epic notes that she should only be seen by her dedicated PCP for primary care.    Today I visited with her through an [employer] relay service over the telephone.    She was referred to ID clinic for a concern of resistant pinworm infection. She states she was diagnosed with pinworms at an emergency department, despite no diagnostic testing being done. She was prescribed mebendazole, and took 2 separate doses. She then presented to a different ED with a concern that the worms had persisted and the treatment wasn't working. A stool sample was collected at that time for O&P, and she was prescribed albendazole, of which she took one pill. She states that she has also now acquired pyrantel pamoate since her symptoms are still persisting.     She describes that she has a severe worm infestation that she sees in her stool, in her mouth, and also coming out of her nose. She states that the worms can be all different sizes and shapes, and typically appear white and mucus-like. Some are up to several inches long, while others are very tiny. She tells me one of the ED docs took one out of her mouth but then threw it away in the garbage. The worms have only gotten worse over time, despite the multiple courses of anti-helminthics. She does not describe seeing worms in her skin. She did not endorse perianal itching until I specifically asked about it, and could not determine if it was worse during the day or at night.     When discussing her associated symptoms, she states she generally feels bad, with abdominal pain, nausea,  and bloody stools. Her legs have been more swollen and painful lately.  She describes white discharge from her vagina. No fevers or chills but states she has been sweating a lot. Her abdominal pain is described as sharp and cramping and is all throughout her abdomen. Her frankly bloody stools have been occurring intermittently over the last few months.     She was diagnosed with Crohns at age 11. Stopped Remicade infusions at 20. She has not been on any treatment for multiple years. She did see a GI doctor earlier this year when she had a perianal I&D.     ROS: Complete 10-point ROS is negative except as noted above.    Past Medical History:  Past Medical History:   Diagnosis Date     Abdominal pain 10/31- 11/4/2005    [facility]     Allergic rhinitis, cause unspecified     Allergic rhinitis     Arthritis      C. difficile diarrhea     Past, no current diarrhea.     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, without mention of intractable epilepsy     pseudoseizures diagnosed after extensive neurologic eval     Migraine 07/21/12    D/C 07/22/12-[facility]     Migraine, unspecified, without mention of intractable migraine without mention of status migrainosus     Migraine     Mild intermittent asthma     mild intermittent     Mycoplasma infection in conditions classified elsewhere and of unspecified site      Other chronic pain     Back pain for 6 years     Renal disease     Kidney stones     Unspecified hearing loss     congenital hearing  loss       Past Surgical History:  Past Surgical History:   Procedure Laterality Date     AS REMOVAL OF COCCYX  10/18/2017     C FUSION OF SACROILIAC JOINT  10/26/2018     COLONOSCOPY  7/1/2009    [facility].     COLONOSCOPY N/A 7/17/2019    Procedure: Colonoscopy, With Polypectomy And Biopsy;  Surgeon: Edwin Conde MD;  Location: [facility] OR     COLONOSCOPY WITH CO2 INSUFFLATION N/A 7/17/2019    Procedure: COLONOSCOPY, WITH CO2 INSUFFLATION;  Surgeon: Edwin Conde MD;  Location: [facility] OR     COMBINED ESOPHAGOSCOPY, GASTROSCOPY, DUODENOSCOPY (EGD) WITH CO2 INSUFFLATION N/A 4/12/2019    Procedure: COMBINED ESOPHAGOSCOPY, GASTROSCOPY, DUODENOSCOPY (EGD) WITH CO2 INSUFFLATION;  Surgeon: Edwin Conde MD;  Location: [facility] OR     ESOPHAGOSCOPY, GASTROSCOPY, DUODENOSCOPY (EGD), COMBINED N/A 4/12/2019    Procedure: Combined Esophagoscopy, Gastroscopy, Duodenoscopy (Egd), Biopsy Single Or Multiple;  Surgeon: Edwin Conde MD;  Location: [facility] OR     FISTULOTOMY RECTUM N/A 4/30/2020    Procedure: EXAM UNDER ANESTHESIA, ANUS, parital fistulotomy;  Surgeon: Valentin Sanchez MD;  Location: [facility] OR     FUSION SPINE POSTERIOR MINIMALLY INVASIVE ONE LEVEL N/A 2/23/2017    Procedure: FUSION SPINE POSTERIOR MINIMALLY INVASIVE ONE LEVEL;  L4-5 Oblique Lateral Lumbar Interbody Fusion   Epidural steroid injection.   Transpedicular Bone marrow aspiration;  Surgeon: Jeniffer Eugene MD;  Location: [facility] OR     HC COLONOSCOPY THRU STOMA WITH BIOPSY  10/29/2003    Impression is that of normal appearing colonoscopy, without evidence of rectal bleeding.     HC COLONOSCOPY THRU STOMA, DIAGNOSTIC  10/00    normal     HC COLONOSCOPY THRU STOMA, DIAGNOSTIC  Oct 2009    Dr López- normal     HC EEG AWAKE AND SLEEP      abnormal     HC MRI BRAIN W/O CONTRAST  12/00    normal     HC REMOVAL GALLBLADDER  8/5/2009    [facility].     HC UGI ENDOSCOPY DIAG W BIOPSY   11/11/09    Normal esophagus     HC UGI ENDOSCOPY, SIMPLE EXAM  7/00, 10/00    mild chronic esophagitis and duodenitis, neg H pylori     HC UGI ENDOSCOPY, SIMPLE EXAM  01/20/2005    Esophagogastroduodenoscopy, colonoscopy with biopsies.  [facility]     HC UGI ENDOSCOPY, SIMPLE EXAM  7/1/2009    [facility] [place]     HC UGI ENDOSCOPY, SIMPLE EXAM  11/11/2009    attempted upper GI, pt. could not tolerate procedure:[facility]     ORTHOPEDIC SURGERY  October 19,2011    diskectomy L4-L5       Social History:  Social History     Socioeconomic History     Marital status: Single     Spouse name: Not on file     Number of children: Not on file     Years of education: Not on file     Highest education level: Not on file   Occupational History     Not on file   Social Needs     Financial resource strain: Not on file     Food insecurity     Worry: Not on file     Inability: Not on file     Transportation needs     Medical: Not on file     Non-medical: Not on file   Tobacco Use     Smoking status: Current Every Day Smoker     Packs/day: 0.25     Years: 5.00     Pack years: 1.25     Types: Cigarettes     Smokeless tobacco: Never Used   Substance and Sexual Activity     Alcohol use: Not Currently     Comment: Not since last July 2018     Drug use: Yes     Types: Marijuana     Comment: Medical Marijuana currently-- More CBD     Sexual activity: Not Currently     Partners: Male     Birth control/protection: Condom, Injection   Lifestyle     Physical activity     Days per week: Not on file     Minutes per session: Not on file     Stress: Not on file   Relationships     Social connections     Talks on phone: Not on file     Gets together: Not on file     Attends Orthodoxy service: Not on file     Active member of club or organization: Not on file     Attends meetings of clubs or organizations: Not on file     Relationship status: Not on file     Intimate partner violence     Fear of current  or ex partner: Not on file     Emotionally abused: Not on file     Physically abused: Not on file     Forced sexual activity: Not on file   Other Topics Concern      Service No     Blood Transfusions No     Caffeine Concern No     Occupational Exposure No     Hobby Hazards No     Sleep Concern No     Stress Concern No     Weight Concern No     Special Diet No     Back Care No     Exercise Yes     Bike Helmet No     Seat Belt Yes     Self-Exams Yes     Parent/sibling w/ CABG, MI or angioplasty before 65F 55M? Yes     Comment: father late 30's early 40's    Social History Narrative     Not on file       Family Medical History:  Family History   Problem Relation Age of Onset     Gastrointestinal Disease Brother         severe Crohn's     Neurologic Disorder Brother         Seizures post head injury     Depression Brother      Substance Abuse Brother      Genitourinary Problems Father         kidney stones     Diabetes Father      Heart Disease Father         Open heart surgery     Breast Cancer Maternal Grandmother      Parkinsonism Maternal Grandmother      Cerebrovascular Disease Paternal Grandmother      Cancer Maternal Grandfather         Lung     Cardiovascular Paternal Grandfather         Heart Attack     Substance Abuse Mother         in recovery x1 year      Lung Cancer Paternal Uncle      Cancer Paternal Uncle      Lung Cancer Maternal Uncle        Allergies:     Allergies   Allergen Reactions     Iohexol Hives     Other reaction(s): Hives  IV contrast; patient states she tolerates contrast if she gets benadryl before  IV contrast; patient states she tolerates contrast if she gets benadryl before     Ativan [Lorazepam] Hives     At the IV site     Baclofen      hives     Bees Hives, Swelling and Difficulty breathing     Caffeine      Contrast Dye      Hives,   Updated 5/10/2016 CT Contrast.     Dilaudid [Hydromorphone] Itching     Iodine Hives     Methocarbamol Swelling     Metoclopramide      Other  reaction(s): Tremors  LW Reaction: shaking/sweating     Midazolam      Other reaction(s): Agitation     Monosodium Glutamate      Morphine Other (See Comments)     Difficulty with urination     Nsaids Other (See Comments)     GI BLEED x2     Other (Do Not Use) Other (See Comments)     Xanaflex- pt becomes disoriented and loses bladder control     Reglan [Metoclopramide Hcl] Other (See Comments)     shaking     Soma [Carisoprodol] Visual Disturbance     Sleep walking     Tizanidine      Topamax Other (See Comments)     Topamax [Topiramate] Nausea     Tingling  GI/Vomit     Tramadol      Severe Headache, Seizure Risk     Tylenol W/Codeine [Acetaminophen-Codeine] Nausea and Itching     Tylenol 3     Versed Other (See Comments)     Zolmitriptan      Makes face feel like its twitching     Droperidol Anxiety     Flu Virus Vaccine Rash      Arm swelling       Medications:  Current Outpatient Medications   Medication Sig Dispense Refill     acetaminophen (TYLENOL) 500 MG tablet Take 2 tablets (1,000 mg) by mouth 3 times daily as needed for mild pain 100 tablet 3     albuterol (PROAIR HFA/PROVENTIL HFA/VENTOLIN HFA) 108 (90 Base) MCG/ACT inhaler Inhale 2 puffs into the lungs every 6 hours as needed for shortness of breath / dyspnea or wheezing 3 Inhaler 3     albuterol (PROVENTIL) (2.5 MG/3ML) 0.083% neb solution Take 1 vial (2.5 mg) by nebulization every 6 hours as needed for shortness of breath / dyspnea or wheezing 50 vial 11     alum & mag hydroxide-simethicone (MAALOX ADVANCED MAX ST) 400-400-40 MG/5ML SUSP suspension Take 15 mLs by mouth every 4 hours as needed for indigestion (mix with lidocaine) 355 mL 11     ARIPiprazole (ABILIFY) 30 MG tablet Take 1 tablet (30 mg) by mouth At Bedtime 30 tablet 1     aspirin (ASA) 81 MG tablet Take 1 tablet (81 mg) by mouth daily 90 tablet 3     azelastine (OPTIVAR) 0.05 % ophthalmic solution Place 1 drop into both eyes 2 times daily 6 mL 11     buprenorphine (BUTRANS) 10 MCG/HR WK  patch Place 1 patch onto the skin every 7 days       buPROPion (WELLBUTRIN XL) 150 MG 24 hr tablet Take 1 tablet (150 mg) by mouth every morning 90 tablet 1     busPIRone (BUSPAR) 15 MG tablet Take 1 tablet (15 mg) by mouth 2 times daily 60 tablet 5     cyclobenzaprine (FLEXERIL) 10 MG tablet Take 1 tablet (10 mg) by mouth 3 times daily as needed for muscle spasms or other (back pain) 90 tablet 3     diclofenac (VOLTAREN) 1 % topical gel Place 2 g onto the skin 4 times daily as needed for moderate pain Stop if any gi upset or symptoms 100 g 0     diphenhydrAMINE (BENADRYL) 50 MG capsule Take 1-2 capsules ( mg) by mouth every 6 hours as needed for itching, allergies or sleep 90 capsule 3     DULoxetine (CYMBALTA) 60 MG EC capsule Take 1 capsule (60 mg) by mouth daily 90 capsule 3     EPINEPHrine (ANY BX GENERIC EQUIV) 0.3 MG/0.3ML injection 2-pack Inject 0.3 mLs (0.3 mg) into the muscle once as needed for anaphylaxis 0.6 mL 3     fexofenadine (ALLEGRA) 180 MG tablet Take 1 tablet (180 mg) by mouth daily 90 tablet 3     fluticasone (FLONASE) 50 MCG/ACT nasal spray Spray 1 spray into both nostrils daily 16 g 1     gabapentin (NEURONTIN) 300 MG capsule Take 1 capsule (300 mg) by mouth 3 times daily 270 capsule 3     hydrochlorothiazide (HYDRODIURIL) 12.5 MG tablet Take 1 tablet (12.5 mg) by mouth daily 30 tablet 1     HYDROmorphone (DILAUDID) 2 MG tablet Take 1 tablet (2 mg) by mouth every 4 hours as needed for moderate to severe pain 12 tablet 0     lidocaine (XYLOCAINE) 2 % solution Swish and swallow 15 mLs in mouth every 4 hours as needed for other (GERD) ; Max 8 doses/24 hour period. Mix with 15 mLs Maalox or Mylanta. 100 mL 3     losartan (COZAAR) 100 MG tablet Take 1 tablet (100 mg) by mouth daily 90 tablet 3     medical cannabis (Patient's own supply) (The purpose of this order is to document that the patient reports taking medical cannabis.  This is not a prescription, and is not used to certify that the  patient has a qualifying medical condition.) 0 Information only 0     medroxyPROGESTERone (DEPO-PROVERA) 150 MG/ML IM injection Inject 1 mL (150 mg) into the muscle every 3 months 3 mL 3     methocarbamol (ROBAXIN) 750 MG tablet Take 1 tablet (750 mg) by mouth 3 times daily as needed for muscle spasms 60 tablet 3     NONFORMULARY Apply 30 mLs topically 4 times daily       nystatin (MYCOSTATIN) 460801 UNIT/ML suspension Take 5 mLs (500,000 Units) by mouth 4 times daily (swish and spit) 400 mL 1     ondansetron (ZOFRAN-ODT) 8 MG ODT tab Take 1 tablet (8 mg) by mouth every 8 hours as needed for nausea 15 tablet 0     oxyCODONE-acetaminophen (PERCOCET) 5-325 MG tablet Take 1-2 tablets by mouth every 4 hours as needed for pain 12 tablet 0     pantoprazole (PROTONIX) 40 MG EC tablet Take 1 tablet (40 mg) by mouth 2 times daily Take 30-60 minutes before a meal. 180 tablet 3     polyethylene glycol (MIRALAX) 17 g packet Take 17 g by mouth 2 times daily 60 packet 0     prazosin (MINIPRESS) 1 MG capsule Take 1 mg by mouth At Bedtime  5     prochlorperazine (COMPAZINE) 10 MG tablet Take 1 tablet (10 mg) by mouth every 6 hours as needed for nausea or vomiting 60 tablet 11     rizatriptan (MAXALT-MLT) 5 MG ODT Take 1-2 tablets (5-10 mg) by mouth at onset of headache for migraine May repeat in 2 hours. Max 6 tablets/24 hours. 18 tablet 3     sucralfate (CARAFATE) 1 GM tablet Take 1 tablet (1 g) by mouth 4 times daily as needed (heartburn) 360 tablet 3     valACYclovir (VALTREX) 1000 mg tablet 2 tabs at onset of mouth sores.  Repeat dose in 12 hours.         Immunizations:  Immunization History   Administered Date(s) Administered     Flu, Unspecified 10/10/2010     HPV 01/07/2009, 04/28/2009, 07/10/2009     HepB 07/01/2000, 10/01/2000, 01/10/2001     Hib, Unspecified 09/02/1992     Historical DTP/aP 1989, 1989, 1989, 04/20/1990, 07/20/1990, 08/11/1994     Influenza (H1N1) 02/15/2014     Influenza (IIV3) PF  11/11/1999, 01/10/2001, 11/01/2006, 11/20/2007, 10/10/2010     Influenza Vaccine IM > 6 months Valent IIV4 10/15/2019     MMR 07/15/1990, 06/27/2001     Mantoux Tuberculin Skin Test 03/08/2010     Meningococcal (Menactra ) 11/01/2006     OPV, trivalent, live 1989, 1989, 1989, 07/20/1990, 09/20/1990, 08/11/1994     Pneumococcal 23 valent 10/15/2019     TD (ADULT, 7+) 06/27/2001     TDAP Vaccine (Adacel) 08/16/2011     Tdap (Adult) Unspecified Formulation 05/22/2016       Exam:  Weight: 190 lbs 0 oz  Gen: Patient communicated over the phone through an [employer] relay service, so I had no direct communication or auditory cues over the phone with her. She answered questions appropriately but became increasingly irritated throughout the interview.    Labs:  Stool O&P (7/10/2020) was negative for parasite or ova.      Assessment and Plan:  Katy Islas is a 30 year old female with complicated history of high healthcare utilization who is referred for a resistant pinworm infection. Given the natural history of pinworm infection, negative O&P, and worsening symptoms despite multiple rounds of effective treatment - I do not believe the patient has a true pinworm infection, nor is infected with any other human parasitic helminth. The description of worms of all different shapes and sizes and residing in her mouth and nose does not fit any known human helminth. Rarely, severe Ascaris infection can result with worms migrating out the esophagus into the mouth. However, these earthworm sized helminths would certainly be obtainable in such a case, whereas the patient has not been able to provide an actual worm for analysis. Further, Ascaris is susceptible to the drugs trialed and such a severe infection would likely have shown on up on a stool O&P.     Therefore, I believe the patient's obsession with worms infecting her body is most consistent with delusional parasitosis, although a slightly odd variant in that  the worms are all retained within the alimentary tract. Further, I am concerned that she has uncontrolled Crohn's disease, given the rectal bleeding and recent issues with a perianal abscess. It's entirely possible that she is seeing mucus and fibrinous exudate in her stool that gives the appearance of worms.     Unfortunately, as I started explaining why I believed her symptoms were unlikely due to parasitic worms and expressing concern about her Crohn's disease, the patient got increasingly irritated, swore at me, and hung up.    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

## 2020-07-15 NOTE — PROGRESS NOTES
"Katy Islas is a 30 year old female who is being evaluated via a billable video visit.      The patient has been notified of following:     \"This video visit will be conducted via a call between you and your physician/provider. We have found that certain health care needs can be provided without the need for an in-person physical exam.  This service lets us provide the care you need with a video conversation.  If a prescription is necessary we can send it directly to your pharmacy.  If lab work is needed we can place an order for that and you can then stop by our lab to have the test done at a later time.    Video visits are billed at different rates depending on your insurance coverage.  Please reach out to your insurance provider with any questions.    If during the course of the call the physician/provider feels a video visit is not appropriate, you will not be charged for this service.\"    Patient has given verbal consent for Video visit? Yes  How would you like to obtain your AVS? MyChart  If you are dropped from the video visit, the video invite should be resent to: Other e-mail: [phone]  Will anyone else be joining your video visit? No        Video-Visit Details    Type of service: Telephone Visit    Video Start Time: 1500  Video End Time: 1530    Originating Location (pt. Location): Home    Distant Location (provider location):  [facility] [place] AND INFECTIOUS DISEASES     Platform used for Telephone Visit: [employer] relay program    Arpit Saha MD        "

## 2020-07-15 NOTE — PATIENT INSTRUCTIONS
Katy,    I am sorry you didn't feel I was more helpful today. Based on the evidence, I still don't think that worms are causing your problem, as the description doesn't fit the behavior of human parasitic worms, the medications haven't helped, and the diagnostic test was negative.     I do remained concerned that the Crohn's disease could be contributing to your symptoms, and feel you should continue discussions with a gastrointestinal doctor about treatment options.    Take care,    Arpit Saha MD

## 2020-07-15 NOTE — LETTER
"7/15/2020       RE: Katy Islas  [address]  [place] [address]     Dear Colleague,    Thank you for referring your patient, Katy Islas, to the [facility] AND INFECTIOUS DISEASES at [facility]. Please see a copy of my visit note below.    Katy Islas is a 30 year old female who is being evaluated via a billable video visit.      The patient has been notified of following:     \"This video visit will be conducted via a call between you and your physician/provider. We have found that certain health care needs can be provided without the need for an in-person physical exam.  This service lets us provide the care you need with a video conversation.  If a prescription is necessary we can send it directly to your pharmacy.  If lab work is needed we can place an order for that and you can then stop by our lab to have the test done at a later time.    Video visits are billed at different rates depending on your insurance coverage.  Please reach out to your insurance provider with any questions.    If during the course of the call the physician/provider feels a video visit is not appropriate, you will not be charged for this service.\"    Patient has given verbal consent for Video visit? Yes  How would you like to obtain your AVS? MyChart  If you are dropped from the video visit, the video invite should be resent to: Other e-mail: [phone]  Will anyone else be joining your video visit? No        Video-Visit Details    Type of service: Telephone Visit    Video Start Time: 1500  Video End Time: 1530    Originating Location (pt. Location): Home    Distant Location (provider location):  [facility] AND INFECTIOUS DISEASES     Platform used for Telephone Visit: ASL relay program    Arpit Saha MD          [facility]  New Patient Visit  7/15/2020     Chief Complaint:      HPI:  Katy Islas is a 30 year old deaf " female with a history of Crohns disease not currently on treatment. In March she underwent I&D of a perianal abscess, with some concern that a fistula could be present. She also has history of depression/anxiety, borderline personality disorder, and frequent ED visit for pain complaints. An Epic notes that she should only be seen by her dedicated PCP for primary care.    Today I visited with her through an ASL relay service over the telephone.    She was referred to ID clinic for a concern of resistant pinworm infection. She states she was diagnosed with pinworms at an emergency department, despite no diagnostic testing being done. She was prescribed mebendazole, and took 2 separate doses. She then presented to a different ED with a concern that the worms had persisted and the treatment wasn't working. A stool sample was collected at that time for O&P, and she was prescribed albendazole, of which she took one pill. She states that she has also now acquired pyrantel pamoate since her symptoms are still persisting.     She describes that she has a severe worm infestation that she sees in her stool, in her mouth, and also coming out of her nose. She states that the worms can be all different sizes and shapes, and typically appear white and mucus-like. Some are up to several inches long, while others are very tiny. She tells me one of the ED docs took one out of her mouth but then threw it away in the garbage. The worms have only gotten worse over time, despite the multiple courses of anti-helminthics. She does not describe seeing worms in her skin. She did not endorse perianal itching until I specifically asked about it, and could not determine if it was worse during the day or at night.     When discussing her associated symptoms, she states she generally feels bad, with abdominal pain, nausea, and bloody stools. Her legs have been more swollen and painful lately.  She describes white discharge from her vagina. No  fevers or chills but states she has been sweating a lot. Her abdominal pain is described as sharp and cramping and is all throughout her abdomen. Her frankly bloody stools have been occurring intermittently over the last few months.     She was diagnosed with Crohns at age 11. Stopped Remicade infusions at 20. She has not been on any treatment for multiple years. She did see a GI doctor earlier this year when she had a perianal I&D.     ROS: Complete 10-point ROS is negative except as noted above.    Past Medical History:  Past Medical History:   Diagnosis Date     Abdominal pain 10/31- 11/4/2005    [facility] admit for Crohn's     Allergic rhinitis, cause unspecified     Allergic rhinitis     Arthritis      C. difficile diarrhea     Past, no current diarrhea.     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, without mention of intractable epilepsy     pseudoseizures diagnosed after extensive neurologic eval     Migraine 07/21/12    D/C 07/22/12-[facility]     Migraine, unspecified, without mention of intractable migraine without mention of status migrainosus     Migraine     Mild intermittent asthma     mild intermittent     Mycoplasma infection in conditions classified elsewhere and of unspecified site      Other chronic pain     Back pain for 6 years     Renal disease     Kidney stones     Unspecified hearing loss     congenital hearing loss       Past Surgical History:  Past Surgical History:   Procedure Laterality Date     AS REMOVAL OF COCCYX  10/18/2017      C FUSION OF SACROILIAC JOINT  10/26/2018     COLONOSCOPY  7/1/2009    [facility].     COLONOSCOPY N/A 7/17/2019    Procedure: Colonoscopy, With Polypectomy And Biopsy;  Surgeon: Edwin Conde MD;  Location: [facility] OR     COLONOSCOPY WITH CO2 INSUFFLATION N/A 7/17/2019    Procedure: COLONOSCOPY, WITH CO2 INSUFFLATION;  Surgeon: Edwin Conde MD;  Location: [facility] OR     COMBINED ESOPHAGOSCOPY, GASTROSCOPY, DUODENOSCOPY (EGD) WITH CO2 INSUFFLATION N/A 4/12/2019    Procedure: COMBINED ESOPHAGOSCOPY, GASTROSCOPY, DUODENOSCOPY (EGD) WITH CO2 INSUFFLATION;  Surgeon: Edwin Conde MD;  Location: [facility] OR     ESOPHAGOSCOPY, GASTROSCOPY, DUODENOSCOPY (EGD), COMBINED N/A 4/12/2019    Procedure: Combined Esophagoscopy, Gastroscopy, Duodenoscopy (Egd), Biopsy Single Or Multiple;  Surgeon: Edwin Conde MD;  Location: [facility] OR     FISTULOTOMY RECTUM N/A 4/30/2020    Procedure: EXAM UNDER ANESTHESIA, ANUS, parital fistulotomy;  Surgeon: Valentin Sanchez MD;  Location: [facility] OR     FUSION SPINE POSTERIOR MINIMALLY INVASIVE ONE LEVEL N/A 2/23/2017    Procedure: FUSION SPINE POSTERIOR MINIMALLY INVASIVE ONE LEVEL;  L4-5 Oblique Lateral Lumbar Interbody Fusion   Epidural steroid injection.   Transpedicular Bone marrow aspiration;  Surgeon: Jeniffer Eugene MD;  Location: [facility] OR     HC COLONOSCOPY THRU STOMA WITH BIOPSY  10/29/2003    Impression is that of normal appearing colonoscopy, without evidence of rectal bleeding.     HC COLONOSCOPY THRU STOMA, DIAGNOSTIC  10/00    normal     HC COLONOSCOPY THRU STOMA, DIAGNOSTIC  Oct 2009    Dr López- normal     HC EEG AWAKE AND SLEEP      abnormal     HC MRI BRAIN W/O CONTRAST  12/00    normal     HC REMOVAL GALLBLADDER  8/5/2009    [facility].     HC UGI ENDOSCOPY DIAG W BIOPSY  11/11/09    Normal esophagus     HC UGI ENDOSCOPY, SIMPLE EXAM  7/00, 10/00    mild chronic esophagitis and duodenitis, neg  H pylori     HC UGI ENDOSCOPY, SIMPLE EXAM  01/20/2005    Esophagogastroduodenoscopy, colonoscopy with biopsies.  [facility]     HC UGI ENDOSCOPY, SIMPLE EXAM  7/1/2009    [facility].     HC UGI ENDOSCOPY, SIMPLE EXAM  11/11/2009    attempted upper GI, pt. could not tolerate procedure:[facility]     ORTHOPEDIC SURGERY  October 19,2011    diskectomy L4-L5       Social History:  Social History     Socioeconomic History     Marital status: Single     Spouse name: Not on file     Number of children: Not on file     Years of education: Not on file     Highest education level: Not on file   Occupational History     Not on file   Social Needs     Financial resource strain: Not on file     Food insecurity     Worry: Not on file     Inability: Not on file     Transportation needs     Medical: Not on file     Non-medical: Not on file   Tobacco Use     Smoking status: Current Every Day Smoker     Packs/day: 0.25     Years: 5.00     Pack years: 1.25     Types: Cigarettes     Smokeless tobacco: Never Used   Substance and Sexual Activity     Alcohol use: Not Currently     Comment: Not since last July 2018     Drug use: Yes     Types: Marijuana     Comment: Medical Marijuana currently-- More CBD     Sexual activity: Not Currently     Partners: Male     Birth control/protection: Condom, Injection   Lifestyle     Physical activity     Days per week: Not on file     Minutes per session: Not on file     Stress: Not on file   Relationships     Social connections     Talks on phone: Not on file     Gets together: Not on file     Attends [employer] service: Not on file     Active member of club or organization: Not on file     Attends meetings of clubs or organizations: Not on file     Relationship status: Not on file     Intimate partner violence     Fear of current or ex partner: Not on file     Emotionally abused: Not on file     Physically abused: Not on file     Forced sexual  activity: Not on file   Other Topics Concern      Service No     Blood Transfusions No     Caffeine Concern No     Occupational Exposure No     Hobby Hazards No     Sleep Concern No     Stress Concern No     Weight Concern No     Special Diet No     Back Care No     Exercise Yes     Bike Helmet No     Seat Belt Yes     Self-Exams Yes     Parent/sibling w/ CABG, MI or angioplasty before 65F 55M? Yes     Comment: father late 30's early 40's    Social History Narrative     Not on file       Family Medical History:  Family History   Problem Relation Age of Onset     Gastrointestinal Disease Brother         severe Crohn's     Neurologic Disorder Brother         Seizures post head injury     Depression Brother      Substance Abuse Brother      Genitourinary Problems Father         kidney stones     Diabetes Father      Heart Disease Father         Open heart surgery     Breast Cancer Maternal Grandmother      Parkinsonism Maternal Grandmother      Cerebrovascular Disease Paternal Grandmother      Cancer Maternal Grandfather         Lung     Cardiovascular Paternal Grandfather         Heart Attack     Substance Abuse Mother         in recovery x1 year      Lung Cancer Paternal Uncle      Cancer Paternal Uncle      Lung Cancer Maternal Uncle        Allergies:     Allergies   Allergen Reactions     Iohexol Hives     Other reaction(s): Hives  IV contrast; patient states she tolerates contrast if she gets benadryl before  IV contrast; patient states she tolerates contrast if she gets benadryl before     Ativan [Lorazepam] Hives     At the IV site     Baclofen      hives     Bees Hives, Swelling and Difficulty breathing     Caffeine      Contrast Dye      Hives,   Updated 5/10/2016 CT Contrast.     Dilaudid [Hydromorphone] Itching     Iodine Hives     Methocarbamol Swelling     Metoclopramide      Other reaction(s): Tremors  LW Reaction: shaking/sweating     Midazolam      Other reaction(s): Agitation     Monosodium  Glutamate      Morphine Other (See Comments)     Difficulty with urination     Nsaids Other (See Comments)     GI BLEED x2     Other (Do Not Use) Other (See Comments)     Xanaflex- pt becomes disoriented and loses bladder control     Reglan [Metoclopramide Hcl] Other (See Comments)     shaking     Soma [Carisoprodol] Visual Disturbance     Sleep walking     Tizanidine      Topamax Other (See Comments)     Topamax [Topiramate] Nausea     Tingling  GI/Vomit     Tramadol      Severe Headache, Seizure Risk     Tylenol W/Codeine [Acetaminophen-Codeine] Nausea and Itching     Tylenol 3     Versed Other (See Comments)     Zolmitriptan      Makes face feel like its twitching     Droperidol Anxiety     Flu Virus Vaccine Rash      Arm swelling       Medications:  Current Outpatient Medications   Medication Sig Dispense Refill     acetaminophen (TYLENOL) 500 MG tablet Take 2 tablets (1,000 mg) by mouth 3 times daily as needed for mild pain 100 tablet 3     albuterol (PROAIR HFA/PROVENTIL HFA/VENTOLIN HFA) 108 (90 Base) MCG/ACT inhaler Inhale 2 puffs into the lungs every 6 hours as needed for shortness of breath / dyspnea or wheezing 3 Inhaler 3     albuterol (PROVENTIL) (2.5 MG/3ML) 0.083% neb solution Take 1 vial (2.5 mg) by nebulization every 6 hours as needed for shortness of breath / dyspnea or wheezing 50 vial 11     alum & mag hydroxide-simethicone (MAALOX ADVANCED MAX ST) 400-400-40 MG/5ML SUSP suspension Take 15 mLs by mouth every 4 hours as needed for indigestion (mix with lidocaine) 355 mL 11     ARIPiprazole (ABILIFY) 30 MG tablet Take 1 tablet (30 mg) by mouth At Bedtime 30 tablet 1     aspirin (ASA) 81 MG tablet Take 1 tablet (81 mg) by mouth daily 90 tablet 3     azelastine (OPTIVAR) 0.05 % ophthalmic solution Place 1 drop into both eyes 2 times daily 6 mL 11     buprenorphine (BUTRANS) 10 MCG/HR WK patch Place 1 patch onto the skin every 7 days       buPROPion (WELLBUTRIN XL) 150 MG 24 hr tablet Take 1 tablet  (150 mg) by mouth every morning 90 tablet 1     busPIRone (BUSPAR) 15 MG tablet Take 1 tablet (15 mg) by mouth 2 times daily 60 tablet 5     cyclobenzaprine (FLEXERIL) 10 MG tablet Take 1 tablet (10 mg) by mouth 3 times daily as needed for muscle spasms or other (back pain) 90 tablet 3     diclofenac (VOLTAREN) 1 % topical gel Place 2 g onto the skin 4 times daily as needed for moderate pain Stop if any gi upset or symptoms 100 g 0     diphenhydrAMINE (BENADRYL) 50 MG capsule Take 1-2 capsules ( mg) by mouth every 6 hours as needed for itching, allergies or sleep 90 capsule 3     DULoxetine (CYMBALTA) 60 MG EC capsule Take 1 capsule (60 mg) by mouth daily 90 capsule 3     EPINEPHrine (ANY BX GENERIC EQUIV) 0.3 MG/0.3ML injection 2-pack Inject 0.3 mLs (0.3 mg) into the muscle once as needed for anaphylaxis 0.6 mL 3     fexofenadine (ALLEGRA) 180 MG tablet Take 1 tablet (180 mg) by mouth daily 90 tablet 3     fluticasone (FLONASE) 50 MCG/ACT nasal spray Spray 1 spray into both nostrils daily 16 g 1     gabapentin (NEURONTIN) 300 MG capsule Take 1 capsule (300 mg) by mouth 3 times daily 270 capsule 3     hydrochlorothiazide (HYDRODIURIL) 12.5 MG tablet Take 1 tablet (12.5 mg) by mouth daily 30 tablet 1     HYDROmorphone (DILAUDID) 2 MG tablet Take 1 tablet (2 mg) by mouth every 4 hours as needed for moderate to severe pain 12 tablet 0     lidocaine (XYLOCAINE) 2 % solution Swish and swallow 15 mLs in mouth every 4 hours as needed for other (GERD) ; Max 8 doses/24 hour period. Mix with 15 mLs Maalox or Mylanta. 100 mL 3     losartan (COZAAR) 100 MG tablet Take 1 tablet (100 mg) by mouth daily 90 tablet 3     medical cannabis (Patient's own supply) (The purpose of this order is to document that the patient reports taking medical cannabis.  This is not a prescription, and is not used to certify that the patient has a qualifying medical condition.) 0 Information only 0     medroxyPROGESTERone (DEPO-PROVERA) 150 MG/ML  IM injection Inject 1 mL (150 mg) into the muscle every 3 months 3 mL 3     methocarbamol (ROBAXIN) 750 MG tablet Take 1 tablet (750 mg) by mouth 3 times daily as needed for muscle spasms 60 tablet 3     NONFORMULARY Apply 30 mLs topically 4 times daily       nystatin (MYCOSTATIN) 409715 UNIT/ML suspension Take 5 mLs (500,000 Units) by mouth 4 times daily (swish and spit) 400 mL 1     ondansetron (ZOFRAN-ODT) 8 MG ODT tab Take 1 tablet (8 mg) by mouth every 8 hours as needed for nausea 15 tablet 0     oxyCODONE-acetaminophen (PERCOCET) 5-325 MG tablet Take 1-2 tablets by mouth every 4 hours as needed for pain 12 tablet 0     pantoprazole (PROTONIX) 40 MG EC tablet Take 1 tablet (40 mg) by mouth 2 times daily Take 30-60 minutes before a meal. 180 tablet 3     polyethylene glycol (MIRALAX) 17 g packet Take 17 g by mouth 2 times daily 60 packet 0     prazosin (MINIPRESS) 1 MG capsule Take 1 mg by mouth At Bedtime  5     prochlorperazine (COMPAZINE) 10 MG tablet Take 1 tablet (10 mg) by mouth every 6 hours as needed for nausea or vomiting 60 tablet 11     rizatriptan (MAXALT-MLT) 5 MG ODT Take 1-2 tablets (5-10 mg) by mouth at onset of headache for migraine May repeat in 2 hours. Max 6 tablets/24 hours. 18 tablet 3     sucralfate (CARAFATE) 1 GM tablet Take 1 tablet (1 g) by mouth 4 times daily as needed (heartburn) 360 tablet 3     valACYclovir (VALTREX) 1000 mg tablet 2 tabs at onset of mouth sores.  Repeat dose in 12 hours.         Immunizations:  Immunization History   Administered Date(s) Administered     Flu, Unspecified 10/10/2010     HPV 01/07/2009, 04/28/2009, 07/10/2009     HepB 07/01/2000, 10/01/2000, 01/10/2001     Hib, Unspecified 09/02/1992     Historical DTP/aP 1989, 1989, 1989, 04/20/1990, 07/20/1990, 08/11/1994     Influenza (H1N1) 02/15/2014     Influenza (IIV3) PF 11/11/1999, 01/10/2001, 11/01/2006, 11/20/2007, 10/10/2010     Influenza Vaccine IM > 6 months Valent IIV4 10/15/2019      MMR 07/15/1990, 06/27/2001     Mantoux Tuberculin Skin Test 03/08/2010     Meningococcal (Menactra ) 11/01/2006     OPV, trivalent, live 1989, 1989, 1989, 07/20/1990, 09/20/1990, 08/11/1994     Pneumococcal 23 valent 10/15/2019     TD (ADULT, 7+) 06/27/2001     TDAP Vaccine (Adacel) 08/16/2011     Tdap (Adult) Unspecified Formulation 05/22/2016       Exam:  Weight: 190 lbs 0 oz  Gen: Patient communicated over the phone through an ASL relay service, so I had no direct communication or auditory cues over the phone with her. She answered questions appropriately but became increasingly irritated throughout the interview.    Labs:  Stool O&P (7/10/2020) was negative for parasite or ova.      Assessment and Plan:  Katy Islas is a 30 year old female with complicated history of high healthcare utilization who is referred for a resistant pinworm infection. Given the natural history of pinworm infection, negative O&P, and worsening symptoms despite multiple rounds of effective treatment - I do not believe the patient has a true pinworm infection, nor is infected with any other human parasitic helminth. The description of worms of all different shapes and sizes and residing in her mouth and nose does not fit any known human helminth. Rarely, severe Ascaris infection can result with worms migrating out the esophagus into the mouth. However, these earthworm sized helminths would certainly be obtainable in such a case, whereas the patient has not been able to provide an actual worm for analysis. Further, Ascaris is susceptible to the drugs trialed and such a severe infection would likely have shown on up on a stool O&P.     Therefore, I believe the patient's obsession with worms infecting her body is most consistent with delusional parasitosis, although a slightly odd variant in that the worms are all retained within the alimentary tract. Further, I am concerned that she has uncontrolled Crohn's disease,  given the rectal bleeding and recent issues with a perianal abscess. It's entirely possible that she is seeing mucus and fibrinous exudate in her stool that gives the appearance of worms.     Unfortunately, as I started explaining why I believed her symptoms were unlikely due to parasitic worms and expressing concern about her Crohn's disease, the patient got increasingly irritated, swore at me, and hung up.    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 Kilpatrick had a Telephone Virtual Visit today and I discussed the case with Dr. Saha, ID Fellow -- I agree with his consultative history, assessment and plan in this outpatient ID Virtual Consult note. This note reflects my observations and opinions and the plan outlined fully reflects my approach. I have reviewed the available history, radiology, laboratory results, and reports with the Fellow.

## 2020-07-16 NOTE — PROGRESS NOTES
"Katy Islas is a 30 year old female who is being evaluated via a billable telephone visit.      The patient has been notified of following:     \"This telephone visit will be conducted via a call between you and your physician/provider. We have found that certain health care needs can be provided without the need for a physical exam.  This service lets us provide the care you need with a short phone conversation.  If a prescription is necessary we can send it directly to your pharmacy.  If lab work is needed we can place an order for that and you can then stop by our lab to have the test done at a later time.    Telephone visits are billed at different rates depending on your insurance coverage. During this emergency period, for some insurers they may be billed the same as an in-person visit.  Please reach out to your insurance provider with any questions.    If during the course of the call the physician/provider feels a telephone visit is not appropriate, you will not be charged for this service.\"    Patient has given verbal consent for Telephone visit?  Yes    What phone number would you like to be contacted at? [phone]    How would you like to obtain your AVS? Billie Johnson     Katy Islas is a 30 year old female who presents via phone visit today for the following health issues:    HPI    Hypertension Follow-up      Do you check your blood pressure regularly outside of the clinic? Yes, 113/73, pulse high at 109               Not always getting that high of a pulse               Average 80-90, /80     Are you following a low salt diet? Yes    Are your blood pressures ever more than 140 on the top number (systolic) OR more   than 90 on the bottom number (diastolic), for example 140/90? No 113/73    - Good and bad days   - Just got approved for spinal cord stimulator trial, will do that August 10th   - Still smoking   - Lost 9 lbs, working on diet mostly, exercise is still hard because " of back some days, mostly walks her dog   - Thinks has apt with nutrition   - Car broke down, getting back today   - Mental health - doing well, going to counseling every Tuesday      Psychiatrist - counseling has apt with psychiatrist to see if she can be seen   - Bowel movements - 3x/day, mucus with blood   - Needs recheck with GI doctor            How many servings of fruits and vegetables do you eat daily?  2-3    On average, how many sweetened beverages do you drink each day (Examples: soda, juice, sweet tea, etc.  Do NOT count diet or artificially sweetened beverages)?   0    How many days per week do you exercise enough to make your heart beat faster? 3 or less    How many minutes a day do you exercise enough to make your heart beat faster? 30 - 60  How many days per week do you miss taking your medication? Sometime     What makes it hard for you to take your medications?  remembering to take      BP Readings from Last 3 Encounters:   07/10/20 (!) 143/79   06/11/20 132/86   06/07/20 (!) 149/98    Wt Readings from Last 3 Encounters:   07/15/20 86.2 kg (190 lb)   07/10/20 89.9 kg (198 lb 3.1 oz)   06/11/20 86.2 kg (190 lb)           Reviewed and updated as needed this visit by Provider  Allergies  Meds  Problems  Med Hx  Surg Hx         Review of Systems   Constitutional, HEENT, cardiovascular, pulmonary, gi and gu systems are negative, except as otherwise noted.       Objective   Reported vitals:  There were no vitals taken for this visit.   healthy, alert and no distress  PSYCH: Alert and oriented times 3; coherent speech, normal   rate and volume, able to articulate logical thoughts, able   to abstract reason, no tangential thoughts, no hallucinations   or delusions  Her affect is normal  RESP: No cough, no audible wheezing, able to talk in full sentences  Remainder of exam unable to be completed due to telephone visits    Diagnostic Test Results:  Labs reviewed in Epic  none         Assessment/Plan:     ICD-10-CM    1. Hypertension goal BP (blood pressure) < 130/80  I10    2. Lumbar disc disease with radiculopathy  M51.16    3. S/P lumbar fusion  Z98.1    4. Adjustment disorder with mixed anxiety and depressed mood  F43.23    5. Bipolar disorder, current episode mixed, severe, without psychotic features (H)  F31.63    6. Mild major depression (H)  F32.0      - Patient doing very well   - Did not want to discuss possible pinworms, but still having bloody and mucous stools     Recommend she return to GI specialist as did the infectious disease doctor   - HTN     Well controlled, still needs to come in for BP check and labs   - Mood      Doing ok, still counseling weekly, may have apt with psychiatrist scheduled soon   - Still due for fasting labs     Will assist in scheduling   - Pain      Working with pain clinic - [facility]   - Dysmenorrhea     Planning hysterectomy in the fall once back is controlled     Due to language barrier, an  was present via teleconnect.     The patient indicates understanding of these issues and agrees with the plan.    Return in about 2 weeks (around 8/5/2020).    Phone call duration:  12 minutes    Zach Rosario PA-C  [facility] [place]

## 2020-07-22 ENCOUNTER — VIRTUAL VISIT (OUTPATIENT)
Dept: FAMILY MEDICINE | Facility: OTHER | Age: 30
End: 2020-07-22
Payer: MEDICARE

## 2020-07-22 ENCOUNTER — TELEPHONE (OUTPATIENT)
Dept: FAMILY MEDICINE | Facility: OTHER | Age: 30
End: 2020-07-22

## 2020-07-22 DIAGNOSIS — F31.63 BIPOLAR DISORDER, CURRENT EPISODE MIXED, SEVERE, WITHOUT PSYCHOTIC FEATURES (H): ICD-10-CM

## 2020-07-22 DIAGNOSIS — F43.23 ADJUSTMENT DISORDER WITH MIXED ANXIETY AND DEPRESSED MOOD: ICD-10-CM

## 2020-07-22 DIAGNOSIS — M51.16 LUMBAR DISC DISEASE WITH RADICULOPATHY: ICD-10-CM

## 2020-07-22 DIAGNOSIS — I10 HYPERTENSION GOAL BP (BLOOD PRESSURE) < 130/80: Primary | ICD-10-CM

## 2020-07-22 DIAGNOSIS — Z98.1 S/P LUMBAR FUSION: ICD-10-CM

## 2020-07-22 DIAGNOSIS — F32.0 MILD MAJOR DEPRESSION (H): ICD-10-CM

## 2020-07-22 PROCEDURE — 99442 ZZC PHYSICIAN TELEPHONE EVALUATION 11-20 MIN: CPT | Performed by: PHYSICIAN ASSISTANT

## 2020-07-22 NOTE — TELEPHONE ENCOUNTER
Dispositions  Check-out Note    0 Return in about 2 weeks (around 8/5/2020).  Schedule fasting lab apt & BP check   Schedule recheck with GI specialist in [place]      Please call to schedule

## 2020-07-22 NOTE — TELEPHONE ENCOUNTER
Left detailed message informing patient through the sign language interpretive phone service    Scheduled appt - 8/3 at 4pm - appt for GI Specialist follow up - video appt  Pt needs appt for Fasting lab   Pt needs appt for BP check  2 week follow up already a standing appt scheduled.

## 2020-07-23 DIAGNOSIS — I10 HYPERTENSION GOAL BP (BLOOD PRESSURE) < 130/80: ICD-10-CM

## 2020-07-23 RX ORDER — HYDROCHLOROTHIAZIDE 12.5 MG/1
12.5 TABLET ORAL DAILY
Qty: 30 TABLET | Refills: 1 | Status: SHIPPED | OUTPATIENT
Start: 2020-07-23 | End: 2020-12-24

## 2020-07-23 NOTE — TELEPHONE ENCOUNTER
Pending Prescriptions:                       Disp   Refills    hydrochlorothiazide (HYDRODIURIL) 12.5 MG*30 tab*1            Sig: Take 1 tablet (12.5 mg) by mouth daily    BP Readings from Last 3 Encounters:   07/10/20 (!) 143/79   06/11/20 132/86   06/07/20 (!) 149/98     Routing refill request to provider for review/approval because:  Labs out of range:  B/p    Claudine Jimenez, MSN, RN

## 2020-07-24 ENCOUNTER — TELEPHONE (OUTPATIENT)
Dept: FAMILY MEDICINE | Facility: OTHER | Age: 30
End: 2020-07-24

## 2020-07-24 ENCOUNTER — TELEPHONE (OUTPATIENT)
Dept: GASTROENTEROLOGY | Facility: CLINIC | Age: 30
End: 2020-07-24

## 2020-07-24 NOTE — TELEPHONE ENCOUNTER
Reason for call:  Symptom  Reason for call:  Patient reporting a symptom      Additional comments: Pt is calling and is insisting to speak with a nurse about all her health problems. It seems as Claudine has taken on her concerns so she would like to get a call from her after 12 today. Please advise.     Phone Number patient can be reached at:  Home number on file [phone] (home)    Best Time:  Anytime after 12    Can we leave a detailed message on this number:  YES    Call taken on 7/24/2020 at 10:05 AM by Sheryl Ruby

## 2020-07-24 NOTE — TELEPHONE ENCOUNTER
Spoke to patient via , since yesterday, has had a cough with dark gray, or yellowish sputum, can see some pus on tonsils, very sore throat on L side of throat, fatigue. Hurts to swallow and yawn even. L ear pain, mild.  Denies fever/chills, difficulty breathing, or any other new symptoms  Sipping warm broth today, helpful    Recommended virtual visit today vs. Urgent care  She states she has gone to urgent care - they will not see her because she has a cough  Reviewed that she could have symptoms consistent with Covid, so may be advised to have Covid testing again with these new symptoms. States 'I do not have Covid, I have been tested twice, this is something else.'    COVID 19 Nurse Triage Plan/Patient Instructions    Please be aware that novel coronavirus (COVID-19) may be circulating in the community. If you develop symptoms such as fever, cough, or SOB or if you have concerns about the presence of another infection including coronavirus (COVID-19), please contact your health care provider or visit [email or web address].     Disposition/Instructions    Virtual Visit with provider recommended - offered visit with a provider this afternoon.  Patient stated 'I am about to fall asleep, never mind and hung up on the call.    Marianna Layne, BSN, RN, PHN

## 2020-07-24 NOTE — TELEPHONE ENCOUNTER
[facility] Call Center    Phone Message    May a detailed message be left on voicemail: no     Reason for Call: Other: Pt did not want to wait until Oct for an in office visit.  Thinks she has an abcess and needs to be examined. Has video conference 8.3.20 with ABBY [facility].  Pt is asking for a call back.      Action Taken: Message routed to:  Adult Clinics: Gastroenterology (GI) p [id number]    Travel Screening:

## 2020-07-24 NOTE — TELEPHONE ENCOUNTER
LVM for pt. Recommended pt discuss her concerns with colorectal surgery.    Yarelis Abbasi RN  Gastroenterology Care Coordinator  [place], [place]

## 2020-07-24 NOTE — TELEPHONE ENCOUNTER
Reason for call:  Patient reporting a symptom    Symptom or request: cough, very bad sore throat, nose running, green coming out. Patient has been tested twice for Covid and it has come back negative both times.     Duration (how long have symptoms been present): 2 months    Have you been treated for this before? No    Additional comments: patient would like to make an appointment and come into the clinic and see a Dr.     Phone Number patient can be reached at:  Home number on file [phone] (home)    Best Time:  Anytime     Can we leave a detailed message on this number:  YES    Call taken on 7/24/2020 at 2:53 PM by Vanesa Linn

## 2020-07-28 ENCOUNTER — TELEPHONE (OUTPATIENT)
Dept: SURGERY | Facility: CLINIC | Age: 30
End: 2020-07-28

## 2020-07-28 ENCOUNTER — TELEPHONE (OUTPATIENT)
Dept: FAMILY MEDICINE | Facility: OTHER | Age: 30
End: 2020-07-28

## 2020-07-28 NOTE — TELEPHONE ENCOUNTER
Reason for call:  Patient reporting a symptom    Symptom or request: patient believes she has an abscess that needs to be drained. She first stated she was having back pain and then said she thinks she has an abscess near her rectum that needs to be drained.     Duration (how long have symptoms been present): ongoing    Have you been treated for this before? 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     Phone Number patient can be reached at:  Other phone number:  [phone]    Best Time:  any    Can we leave a detailed message on this number:  YES    Call taken on 7/28/2020 at 7:29 AM by Souleymane Garcia

## 2020-07-28 NOTE — TELEPHONE ENCOUNTER
[facility] Call Center    Phone Message    May a detailed message be left on voicemail: yes     Reason for Call: Other: Patient called in with symptoms of rectal abscess x1 week but this morning it feels as though its ready to rupture.  She wants to be seen today if possible.      Action Taken: Message routed to:  [facility]    Travel Screening: Not Applicable

## 2020-07-28 NOTE — TELEPHONE ENCOUNTER
LVM using . Called to check in on symptoms. Stated that I saw patient was in the ER a couple days ago for upper respiratory tract infection. I stated if she is having any cough, sore throat, SOB she needs to be tested for COVID before coming into the clinic. Left call back number.

## 2020-07-29 NOTE — TELEPHONE ENCOUNTER
Pt also contacted [facility]. They have left a message for pt to call them back. Closing encounter    Claudine Jimenez, MSN, RN

## 2020-07-30 NOTE — PROGRESS NOTES
"Katy Islas is a 30 year old female who is being evaluated via a billable telephone visit.      The patient has been notified of following:     \"This telephone visit will be conducted via a call between you and your physician/provider. We have found that certain health care needs can be provided without the need for a physical exam.  This service lets us provide the care you need with a short phone conversation.  If a prescription is necessary we can send it directly to your pharmacy.  If lab work is needed we can place an order for that and you can then stop by our lab to have the test done at a later time.    Telephone visits are billed at different rates depending on your insurance coverage. During this emergency period, for some insurers they may be billed the same as an in-person visit.  Please reach out to your insurance provider with any questions.    If during the course of the call the physician/provider feels a telephone visit is not appropriate, you will not be charged for this service.\"    Patient has given verbal consent for Telephone visit?  Yes    What phone number would you like to be contacted at? [phone]    How would you like to obtain your AVS? [place]    Subjective     Katy Islas is a 30 year old female who presents via phone visit today for the following health issues:    HPI    Very sore in back   - Seems like getting worse   - Started physical therapy   - Next Monday getting spinal cord stimulator trial (8/10/20)     HTN   - 120/83 yesterday   - Needs a BP cuff   - Swelling in feet   - Been more active than normal     Wants to go to [facility] for colitis/crohns   Still needs to set up Colorectal surgeon apt     Mood is doing OK   - Continues weekly counseling   - Did not hear back about psychiatry             BP Readings from Last 3 Encounters:   07/10/20 (!) 143/79   06/11/20 132/86   06/07/20 (!) 149/98    Wt Readings from Last 3 Encounters:   07/15/20 86.2 kg (190 lb)   07/10/20 " 89.9 kg (198 lb 3.1 oz)   06/11/20 86.2 kg (190 lb)                    Reviewed and updated as needed this visit by Provider  Allergies  Meds  Problems  Med Hx  Surg Hx         Review of Systems   Constitutional, HEENT, cardiovascular, pulmonary, gi and gu systems are negative, except as otherwise noted.       Objective   Reported vitals:  There were no vitals taken for this visit.   healthy, alert and no distress  PSYCH: Alert and oriented times 3; coherent speech, normal   rate and volume, able to articulate logical thoughts, able   to abstract reason, no tangential thoughts, no hallucinations   or delusions  Her affect is normal  RESP: No cough, no audible wheezing, able to talk in full sentences  Remainder of exam unable to be completed due to telephone visits    Diagnostic Test Results:  Labs reviewed in Epic  none         Assessment/Plan:    ICD-10-CM    1. Crohn's disease of colon with fistula (H)  K50.113 GASTROENTEROLOGY ADULT REF CONSULT ONLY   2. Hypertension goal BP (blood pressure) < 130/80  I10    3. Mild major depression (H)  F32.0 MENTAL HEALTH REFERRAL  - Adult; Psychiatry; Psychiatry; Other: [facility] [phone]; We will contact you to schedule the appointment or please call with any questions   4. Bipolar disorder, current episode mixed, severe, without psychotic features (H)  F31.63 MENTAL HEALTH REFERRAL  - Adult; Psychiatry; Psychiatry; Other: [facility] [phone]; We will contact you to schedule the appointment or please call with any questions   5. Gastroesophageal reflux disease without esophagitis  K21.9      - Patient doing well over all   - Asked about referrals, provided her with numbers      Needs colorectal surgery follow up, wants to switch to [facility] from [place], and psychiatry - wants to go back to [facility]      Gave her information on these   - Still needs her fasting lab, BP check, and Depo shot apt, missed it because she forgot      Will schedule  this   - BPs in normal range at home   - Reviewed medications at length and refilled as needed   - Continues to follow with [facility] for her back      Getting spinal cord stimulator next Monday     Due to language barrier, an  was present during the history-taking and subsequent discussion with this patient.    The patient indicates understanding of these issues and agrees with the plan.    Return in about 2 weeks (around 8/19/2020).    Phone call duration:  13 minutes    Zach Rosario PA-C  [facility]

## 2020-08-04 ENCOUNTER — TRANSFERRED RECORDS (OUTPATIENT)
Dept: HEALTH INFORMATION MANAGEMENT | Facility: CLINIC | Age: 30
End: 2020-08-04

## 2020-08-04 LAB — PHQ9 SCORE: 3

## 2020-08-05 ENCOUNTER — VIRTUAL VISIT (OUTPATIENT)
Dept: FAMILY MEDICINE | Facility: OTHER | Age: 30
End: 2020-08-05
Payer: MEDICARE

## 2020-08-05 ENCOUNTER — TELEPHONE (OUTPATIENT)
Dept: FAMILY MEDICINE | Facility: OTHER | Age: 30
End: 2020-08-05

## 2020-08-05 ENCOUNTER — NURSE TRIAGE (OUTPATIENT)
Dept: NURSING | Facility: CLINIC | Age: 30
End: 2020-08-05

## 2020-08-05 ENCOUNTER — MYC MEDICAL ADVICE (OUTPATIENT)
Dept: FAMILY MEDICINE | Facility: OTHER | Age: 30
End: 2020-08-05

## 2020-08-05 DIAGNOSIS — I10 HYPERTENSION GOAL BP (BLOOD PRESSURE) < 130/80: ICD-10-CM

## 2020-08-05 DIAGNOSIS — K21.9 GASTROESOPHAGEAL REFLUX DISEASE WITHOUT ESOPHAGITIS: ICD-10-CM

## 2020-08-05 DIAGNOSIS — F32.0 MILD MAJOR DEPRESSION (H): ICD-10-CM

## 2020-08-05 DIAGNOSIS — K50.113 CROHN'S DISEASE OF COLON WITH FISTULA (H): Primary | ICD-10-CM

## 2020-08-05 DIAGNOSIS — F31.63 BIPOLAR DISORDER, CURRENT EPISODE MIXED, SEVERE, WITHOUT PSYCHOTIC FEATURES (H): ICD-10-CM

## 2020-08-05 PROCEDURE — 99214 OFFICE O/P EST MOD 30 MIN: CPT | Mod: 95 | Performed by: PHYSICIAN ASSISTANT

## 2020-08-05 NOTE — PATIENT INSTRUCTIONS
1. Schedule Depo shot, fasting labs, and BP check     2. Recheck with Zach as scheduled every 2 weeks     3. Call for specialists as below       Colorectal surgery - [phone]       [facility] - [phone]       [place]

## 2020-08-06 ENCOUNTER — ALLIED HEALTH/NURSE VISIT (OUTPATIENT)
Dept: FAMILY MEDICINE | Facility: OTHER | Age: 30
End: 2020-08-06
Payer: MEDICARE

## 2020-08-06 ENCOUNTER — HOSPITAL ENCOUNTER (EMERGENCY)
Facility: CLINIC | Age: 30
Discharge: HOME | End: 2020-08-06
Attending: FAMILY MEDICINE | Admitting: FAMILY MEDICINE
Payer: MEDICARE

## 2020-08-06 ENCOUNTER — MYC MEDICAL ADVICE (OUTPATIENT)
Dept: FAMILY MEDICINE | Facility: OTHER | Age: 30
End: 2020-08-06

## 2020-08-06 VITALS
HEART RATE: 78 BPM | TEMPERATURE: 98.5 F | RESPIRATION RATE: 16 BRPM | DIASTOLIC BLOOD PRESSURE: 92 MMHG | SYSTOLIC BLOOD PRESSURE: 149 MMHG | OXYGEN SATURATION: 99 %

## 2020-08-06 DIAGNOSIS — Z13.220 SCREENING FOR LIPID DISORDERS: ICD-10-CM

## 2020-08-06 DIAGNOSIS — Z30.9 CONTRACEPTIVE MANAGEMENT: Primary | ICD-10-CM

## 2020-08-06 DIAGNOSIS — N10 ACUTE PYELONEPHRITIS: ICD-10-CM

## 2020-08-06 DIAGNOSIS — Z83.3 FAMILY HISTORY OF DIABETES MELLITUS: ICD-10-CM

## 2020-08-06 DIAGNOSIS — I10 HYPERTENSION GOAL BP (BLOOD PRESSURE) < 130/80: ICD-10-CM

## 2020-08-06 DIAGNOSIS — N30.01 ACUTE CYSTITIS WITH HEMATURIA: Primary | ICD-10-CM

## 2020-08-06 LAB
ALBUMIN UR-MCNC: 30 MG/DL
ANION GAP SERPL CALCULATED.3IONS-SCNC: 4 MMOL/L (ref 3–14)
APPEARANCE UR: ABNORMAL
BACTERIA #/AREA URNS HPF: ABNORMAL /HPF
BILIRUB UR QL STRIP: NEGATIVE
BUN SERPL-MCNC: 16 MG/DL (ref 7–30)
CALCIUM SERPL-MCNC: 9.3 MG/DL (ref 8.5–10.1)
CHLORIDE SERPL-SCNC: 105 MMOL/L (ref 94–109)
CHOLEST SERPL-MCNC: 200 MG/DL
CO2 SERPL-SCNC: 28 MMOL/L (ref 20–32)
COLOR UR AUTO: YELLOW
CREAT SERPL-MCNC: 0.89 MG/DL (ref 0.52–1.04)
GFR SERPL CREATININE-BSD FRML MDRD: 86 ML/MIN/{1.73_M2}
GLUCOSE SERPL-MCNC: 97 MG/DL (ref 70–99)
GLUCOSE UR STRIP-MCNC: NEGATIVE MG/DL
HBA1C MFR BLD: 4.9 % (ref 0–5.6)
HCG UR QL: NEGATIVE
HDLC SERPL-MCNC: 49 MG/DL
HGB UR QL STRIP: ABNORMAL
KETONES UR STRIP-MCNC: NEGATIVE MG/DL
LDLC SERPL CALC-MCNC: 131 MG/DL
LEUKOCYTE ESTERASE UR QL STRIP: ABNORMAL
MUCOUS THREADS #/AREA URNS LPF: PRESENT /LPF
NITRATE UR QL: NEGATIVE
NONHDLC SERPL-MCNC: 151 MG/DL
PH UR STRIP: 5 PH (ref 5–7)
POTASSIUM SERPL-SCNC: 4.8 MMOL/L (ref 3.4–5.3)
RBC #/AREA URNS AUTO: 25 /HPF (ref 0–2)
SODIUM SERPL-SCNC: 137 MMOL/L (ref 133–144)
SOURCE: ABNORMAL
SP GR UR STRIP: 1.02 (ref 1–1.03)
SQUAMOUS #/AREA URNS AUTO: 2 /HPF (ref 0–1)
TRIGL SERPL-MCNC: 98 MG/DL
UROBILINOGEN UR STRIP-MCNC: 0 MG/DL (ref 0–2)
WBC #/AREA URNS AUTO: 474 /HPF (ref 0–5)

## 2020-08-06 PROCEDURE — 80048 BASIC METABOLIC PNL TOTAL CA: CPT | Performed by: PHYSICIAN ASSISTANT

## 2020-08-06 PROCEDURE — 96372 THER/PROPH/DIAG INJ SC/IM: CPT

## 2020-08-06 PROCEDURE — 81025 URINE PREGNANCY TEST: CPT | Performed by: PHYSICIAN ASSISTANT

## 2020-08-06 PROCEDURE — 99283 EMERGENCY DEPT VISIT LOW MDM: CPT | Performed by: FAMILY MEDICINE

## 2020-08-06 PROCEDURE — 36415 COLL VENOUS BLD VENIPUNCTURE: CPT | Performed by: PHYSICIAN ASSISTANT

## 2020-08-06 PROCEDURE — 87086 URINE CULTURE/COLONY COUNT: CPT | Performed by: FAMILY MEDICINE

## 2020-08-06 PROCEDURE — 80061 LIPID PANEL: CPT | Performed by: PHYSICIAN ASSISTANT

## 2020-08-06 PROCEDURE — 99284 EMERGENCY DEPT VISIT MOD MDM: CPT | Mod: Z6 | Performed by: FAMILY MEDICINE

## 2020-08-06 PROCEDURE — 81001 URINALYSIS AUTO W/SCOPE: CPT | Performed by: FAMILY MEDICINE

## 2020-08-06 PROCEDURE — 83036 HEMOGLOBIN GLYCOSYLATED A1C: CPT | Performed by: PHYSICIAN ASSISTANT

## 2020-08-06 PROCEDURE — 87186 SC STD MICRODIL/AGAR DIL: CPT | Performed by: FAMILY MEDICINE

## 2020-08-06 PROCEDURE — 87088 URINE BACTERIA CULTURE: CPT | Performed by: FAMILY MEDICINE

## 2020-08-06 RX ORDER — CIPROFLOXACIN 500 MG/1
500 TABLET, FILM COATED ORAL 2 TIMES DAILY
Qty: 20 TABLET | Refills: 0 | Status: SHIPPED | OUTPATIENT
Start: 2020-08-06 | End: 2020-08-19

## 2020-08-06 RX ADMIN — MEDROXYPROGESTERONE ACETATE 150 MG: 150 INJECTION, SUSPENSION INTRAMUSCULAR at 11:00

## 2020-08-06 NOTE — RESULT ENCOUNTER NOTE
Emergency Dept/Urgent Care discharge antibiotic (if prescribed): Ciprofloxacin (Cipro) 500 mg tablet, 1 tablet (500 mg) by mouth 2 times daily for 10 days.  Date of Rx (if applicable):  8/6/20  No changes in treatment per Urine culture protocol.

## 2020-08-06 NOTE — PROGRESS NOTES
Clinic Administered Medication Documentation      Depo Provera Documentation    URINE HCG: negative    Depo-Provera Standing Order inclusion/exclusion criteria reviewed.   Patient meets: inclusion criteria     BP: Data Unavailable  LAST PAP/EXAM:   Lab Results   Component Value Date    PAP NIL 01/16/2020       Prior to injection, verified patient identity using patient's name and date of birth. Medication was administered. Please see MAR and medication order for additional information.     Was entire vial of medication used? Yes  Vial/Syringe: Single dose vial  Expiration Date:  02/2021    Patient instructed to remain in clinic for 15 minutes.  NEXT INJECTION DUE: 10/22/20 - 11/5/20

## 2020-08-06 NOTE — TELEPHONE ENCOUNTER
"Katy calling with ,    Thinks she may have passed a kidney stone or there is still one in her urethra.  This am she noticed a pain while urinating and there was a little bit of bright red blood.  And just went to the bathroom again and \"screamed\" when urinating.    Reports having known kidney stones.  6/10 pain rating, Pain is in urethra area, around the clitoris area.  Yesterday was having excrutiating back pain and this early morning, but now it is okay.  Had phone visit with PCP today, but had not mentioned.    No visits open with PCP tomorrow, so notified patient that would be sending through a high priority message to PCP team to determine a visit type for tomorrow, as patient will already be present for labs and a nurse only visit.    COVID 19 Nurse Triage Plan/Patient Instructions    Please be aware that novel coronavirus (COVID-19) may be circulating in the community. If you develop symptoms such as fever, cough, or SOB or if you have concerns about the presence of another infection including coronavirus (COVID-19), please contact your health care provider or visit [email or web address].     Disposition/Instructions    In-Person Visit with provider recommended. Reference Visit Selection Guide.    Thank you for taking steps to prevent the spread of this virus.  o Limit your contact with others.  o Wear a simple mask to cover your cough.  o Wash your hands well and often.    Resources    [facility]: About COVID-19: [email or web address]    CDC: What to Do If You're Sick: www.cdc.gov/coronavirus/2019-ncov/about/steps-when-sick.html    CDC: Ending Home Isolation: www.cdc.gov/coronavirus/2019-ncov/hcp/disposition-in-home-patients.html     CDC: Caring for Someone: www.cdc.gov/coronavirus/2019-ncov/if-you-are-sick/care-for-someone.html     MD: Interim Guidance for Hospital Discharge to Home: www.health.[facility].[place]/diseases/coronavirus/hcp/hospdischarge.pdf    [facility] " clinical trials (COVID-19 research studies): clinicalaffairs.[facility]/n-clinical-trials     Below are the COVID-19 hotlines at the [place] Department of Health ([facility]). Interpreters are available.   o For health questions: Call 016-575-0985 or 1-352.160.9297 (7 a.m. to 7 p.m.)  o For questions about schools and childcare: Call 542-505-6586 or 1-788.209.2182 (7 a.m. to 7 p.m.)         Loida Douglas RN on 8/5/2020 at 11:26 PM                    Additional Information    Negative: Shock suspected (e.g., cold/pale/clammy skin, too weak to stand, low BP, rapid pulse)    Negative: Sounds like a life-threatening emergency to the triager    Negative: Urinary catheter, questions about    Negative: Recent back or abdominal injury    Negative: Recent genital injury    Negative: [1] Unable to urinate (or only a few drops) > 4 hours AND [2] bladder feels very full (e.g., palpable bladder or strong urge to urinate)    Negative: Passing pure blood or large blood clots (i.e., size > a dime) (Exception: dorothy or small strands)    Negative: Fever > 100.5 F (38.1 C)    Negative: Patient sounds very sick or weak to the triager    Negative: Known sickle cell disease    Side (flank) or back pain present    Negative: Taking Coumadin (warfarin) or other strong blood thinner, or known bleeding disorder (e.g., thrombocytopenia)    Protocols used: URINE - BLOOD IN-A-

## 2020-08-06 NOTE — ED NOTES
services never answered our call so we continued with patient permission with paper question and answer due to inability to read lips with masks on

## 2020-08-06 NOTE — ED TRIAGE NOTES
Pt reports having pain in back for two weeks and she was told she has pin worms and given medication and now having pain with urination and wants things checked out, she has urine sample with in a jar and is reporting there are 2 worms in it but writer unable to identify any worms

## 2020-08-06 NOTE — TELEPHONE ENCOUNTER
Patient went to ED last night and was treated for UTI.    Zach Rosario PA-C  [facility]

## 2020-08-06 NOTE — ED PROVIDER NOTES
History     Chief Complaint   Patient presents with     Back Pain     Dysuria     HPI  Katy Islas is a 30 year old female who presents to the ED tonight with dysuria.  She has been having back pain for a couple weeks.  Was told she had pinworms and given medication for that.  Now developed dysuria the past day or so and wants to be checked out.  She denies any fevers chills or sweats.  Does have back pain left greater than right.  No nausea or vomiting.   services never answering our call so with patient's permission, we communicated by writing notes back and forth.  She is an excellent lip reader but due to the COVID-19 situation, everyone is wearing masks.    CT scan without contrast on 3/20/2020 showed tiny nonobstructing right renal calculi.  Subcentimeter right renal hypodensities presumably small hemorrhagic cysts given the appearance on prior contrast study from 9/26/2018.        Allergies:  Allergies   Allergen Reactions     Iohexol Hives     Other reaction(s): Hives  IV contrast; patient states she tolerates contrast if she gets benadryl before  IV contrast; patient states she tolerates contrast if she gets benadryl before     Ativan [Lorazepam] Hives     At the IV site     Baclofen      hives     Bees Hives, Swelling and Difficulty breathing     Caffeine      Contrast Dye      Hives,   Updated 5/10/2016 CT Contrast.     Dilaudid [Hydromorphone] Itching     Iodine Hives     Methocarbamol Swelling     Metoclopramide      Other reaction(s): Tremors  LW Reaction: shaking/sweating     Midazolam      Other reaction(s): Agitation     Monosodium Glutamate      Morphine Other (See Comments)     Difficulty with urination     Nsaids Other (See Comments)     GI BLEED x2     Other (Do Not Use) Other (See Comments)     Xanaflex- pt becomes disoriented and loses bladder control     Reglan [Metoclopramide Hcl] Other (See Comments)     shaking     Soma [Carisoprodol] Visual Disturbance     Sleep walking      Tizanidine      Topamax Other (See Comments)     Topamax [Topiramate] Nausea     Tingling  GI/Vomit     Tramadol      Severe Headache, Seizure Risk     Tylenol W/Codeine [Acetaminophen-Codeine] Nausea and Itching     Tylenol 3     Versed Other (See Comments)     Zolmitriptan      Makes face feel like its twitching     Droperidol Anxiety     Flu Virus Vaccine Rash      Arm swelling       Problem List:    Patient Active Problem List    Diagnosis Date Noted     Patellofemoral pain syndrome of both knees 06/11/2020     Priority: Medium     Primary osteoarthritis of both knees 06/11/2020     Priority: Medium     Abscess of anal and rectal regions 04/27/2020     Priority: Medium     Added automatically from request for surgery [id number]       Rectal pain 04/19/2020     Priority: Medium     Abdominal pain 04/18/2020     Priority: Medium     Morbid obesity (H) 03/10/2020     Priority: Medium     Abnormal uterine bleeding 02/12/2020     Priority: Medium     Added automatically from request for surgery [id number]       Anal fissure 02/04/2020     Priority: Medium     Low hematocrit 02/04/2020     Priority: Medium     Elevated d-dimer 02/04/2020     Priority: Medium     Hypertension goal BP (blood pressure) < 130/80 08/05/2019     Priority: Medium     Bulge of cervical disc without myelopathy 04/24/2019     Priority: Medium     Cervicalgia 04/17/2019     Priority: Medium     Class 1 obesity due to excess calories without serious comorbidity with body mass index (BMI) of 32.0 to 32.9 in adult 04/03/2019     Priority: Medium     Hypophosphatemia 11/01/2018     Priority: Medium     Patellar maltracking, right 09/05/2018     Priority: Medium     Right anterior knee pain 09/05/2018     Priority: Medium     Melanocytic nevus, unspecified location 07/23/2018     Priority: Medium     Dysplastic skin lesion 07/23/2018     Priority: Medium     Iliotibial band syndrome affecting lower leg, right 12/21/2017     Priority: Medium      Chondromalacia of right patellofemoral joint 12/21/2017     Priority: Medium     Upper GI bleed - suspected 07/01/2017     Priority: Medium     Tobacco use disorder 07/01/2017     Priority: Medium     History of pseudoseizure 07/01/2017     Priority: Medium     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) 03/10/2017     Priority: Medium     Lumbar disc disease with radiculopathy 02/23/2017     Priority: Medium     S/P lumbar fusion 02/23/2017     Priority: Medium     Dr. YOVANI Millan, 2/23/17       Vitamin D deficiency 01/06/2017     Priority: Medium     Atypical mole 01/06/2017     Priority: Medium     Mild persistent asthma without complication 12/02/2016     Priority: Medium     Chronic bilateral low back pain with left-sided sciatica 12/02/2016     Priority: Medium     Bee sting allergy 09/16/2016     Priority: Medium     Gastroesophageal reflux disease without esophagitis 08/26/2016     Priority: Medium     Herpes simplex virus infection 11/12/2015     Priority: Medium     Adjustment disorder with mixed anxiety and depressed mood 10/14/2015     Priority: Medium     Marijuana abuse 02/22/2015     Priority: Medium     Bipolar disorder (H) 01/31/2013     Priority: Medium     Problem list name updated by automated process. Provider to review       Chronic pain 02/09/2012     Priority: Medium     Patient is followed by Aura Rosario PA-C for ongoing prescription of pain medication.  All refills should only be approved by this provider, or covering partner.    Medication(s): Norco    Maximum quantity per month: 70  Clinic visit frequency required: Q 2 months     Controlled substance agreement:   Discussed and signed 4/25/17    Encounter-Level CSA - 01/12/2015:                 Controlled Substance Agreement - Scan on 1/26/2015  9:14 AM : Controlled Medication Agreement-01/12/15 (below)            Pain Clinic evaluation in the past: Yes       Date/Location:   [facility], fall 2016    DIRE Total  Score(s):    4/25/2017   Total Score 17       Last [facility] website verification:  done on 4/25/17 by LOVE Maki   [email or web address]           Anxiety 09/22/2011     Priority: Medium     Mild major depression (H) 08/29/2011     Priority: Medium     Mild intermittent asthma 10/12/2009     Priority: Medium     Overview:   Formatting of this note might be different from the original.  Action plan: See letter 4/10/2013   ATAQ:   Asthma Data 4/10/2013   Date completed 4/10/2013   Missed daily activities no = 0   Wake at night no = 0   Believe asthma in control yes = 0   ARIN use yes   Maximum ARIN use in 1 day 1-4 = 0   ATAQ score 0 = well controlled   Asthma ED visits in past 12 mos 0   Asthma hospitalizations in past 12 mos 0     Current Outpatient Prescriptions   Medication Sig     albuterol (PROVENTIL) 0.083 % neb solution Inhale 3 mL via a nebulizer every 4 hours if needed for Shortness Of Breath.        Crohn's colitis (H) 08/04/2009     Priority: Medium     Dysmenorrhea 05/11/2007     Priority: Medium     Benign neoplasm of skin of lower limb, including hip 03/16/2004     Priority: Medium     Migraine      Priority: Medium     Dr. Farrar - Neurology.  Now on Inderal.  Seems to be working.  Follow-up 9/08  Problem list name updated by automated process. Provider to review       Hearing loss      Priority: Medium     Congenital  Problem list name updated by automated process. Provider to review       Allergic rhinitis      Priority: Medium     Problem list name updated by automated process. Provider to review          Past Medical History:    Past Medical History:   Diagnosis Date     Abdominal pain 10/31- 11/4/2005     Allergic rhinitis, cause unspecified      Arthritis      C. 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     COLONOSCOPY N/A 7/17/2019    Procedure: Colonoscopy, With Polypectomy And Biopsy;  Surgeon: Edwin Conde MD;  Location: [facility] OR     COLONOSCOPY WITH CO2 INSUFFLATION N/A 7/17/2019    Procedure: COLONOSCOPY, WITH CO2 INSUFFLATION;  Surgeon: Edwin Conde MD;  Location: [facility] OR     COMBINED ESOPHAGOSCOPY, GASTROSCOPY, DUODENOSCOPY (EGD) WITH CO2 INSUFFLATION N/A 4/12/2019    Procedure: COMBINED ESOPHAGOSCOPY, GASTROSCOPY, DUODENOSCOPY (EGD) WITH CO2 INSUFFLATION;  Surgeon: Edwin Conde MD;  Location: [facility] OR     ESOPHAGOSCOPY, GASTROSCOPY, DUODENOSCOPY (EGD), COMBINED N/A 4/12/2019    Procedure: Combined Esophagoscopy, Gastroscopy, Duodenoscopy (Egd), Biopsy Single Or Multiple;  Surgeon: Edwin Conde MD;  Location: [facility] OR     FISTULOTOMY RECTUM N/A 4/30/2020    Procedure: EXAM UNDER ANESTHESIA, ANUS, parital fistulotomy;  Surgeon: Valentin Sanchez MD;  Location: [facility]     FUSION SPINE POSTERIOR MINIMALLY INVASIVE ONE LEVEL N/A 2/23/2017    Procedure: FUSION SPINE POSTERIOR MINIMALLY INVASIVE ONE LEVEL;  L4-5 Oblique Lateral Lumbar Interbody Fusion   Epidural steroid injection.   Transpedicular Bone marrow aspiration;  Surgeon: Jeniffer Eugene MD;   Location: [facility] OR     HC COLONOSCOPY THRU STOMA WITH BIOPSY  10/29/2003    Impression is that of normal appearing colonoscopy, without evidence of rectal bleeding.     HC COLONOSCOPY THRU STOMA, DIAGNOSTIC  10/00    normal     HC COLONOSCOPY THRU STOMA, DIAGNOSTIC  Oct 2009    Dr López- normal     HC EEG AWAKE AND SLEEP      abnormal     HC MRI BRAIN W/O CONTRAST  12/00    normal     HC REMOVAL GALLBLADDER  8/5/2009    [facility], [facility].     HC UGI ENDOSCOPY DIAG W BIOPSY  11/11/09    Normal esophagus     HC UGI ENDOSCOPY, SIMPLE EXAM  7/00, 10/00    mild chronic esophagitis and duodenitis, neg H pylori     HC UGI ENDOSCOPY, SIMPLE EXAM  01/20/2005    Esophagogastroduodenoscopy, colonoscopy with biopsies.  [facility]     HC UGI ENDOSCOPY, SIMPLE EXAM  7/1/2009    [facility], [facility].      UGI ENDOSCOPY, SIMPLE EXAM  11/11/2009    attempted upper GI, pt. could not tolerate procedure:[facility]     ORTHOPEDIC SURGERY  October 19,2011    diskectomy L4-L5       Family History:    Family History   Problem Relation Age of Onset     Gastrointestinal Disease Brother         severe Crohn's     Neurologic Disorder Brother         Seizures post head injury     Depression Brother      Substance Abuse Brother      Genitourinary Problems Father         kidney stones     Diabetes Father      Heart Disease Father         Open heart surgery     Breast Cancer Maternal Grandmother      Parkinsonism Maternal Grandmother      Cerebrovascular Disease Paternal Grandmother      Cancer Maternal Grandfather         Lung     Cardiovascular Paternal Grandfather         Heart Attack     Substance Abuse Mother         in recovery x1 year      Lung Cancer Paternal Uncle      Cancer Paternal Uncle      Lung Cancer Maternal Uncle        Social History:  Marital Status:  Single [1]  Social History     Tobacco Use     Smoking status: Current Every Day Smoker     Packs/day: 0.25     Years: 5.00      Pack years: 1.25     Types: Cigarettes     Smokeless tobacco: Never Used   Substance Use Topics     Alcohol use: Not Currently     Comment: Not since last July 2018     Drug use: Yes     Types: Marijuana     Comment: Medical Marijuana currently-- More CBD        Medications:    Buprenorphine HCl (BELBUCA) 300 MCG FILM buccal film  ciprofloxacin (CIPRO) 500 MG tablet  acetaminophen (TYLENOL) 500 MG tablet  albuterol (PROAIR HFA/PROVENTIL HFA/VENTOLIN HFA) 108 (90 Base) MCG/ACT inhaler  albuterol (PROVENTIL) (2.5 MG/3ML) 0.083% neb solution  alum & mag hydroxide-simethicone (MAALOX ADVANCED MAX ST) 400-400-40 MG/5ML SUSP suspension  ARIPiprazole (ABILIFY) 30 MG tablet  aspirin (ASA) 81 MG tablet  azelastine (OPTIVAR) 0.05 % ophthalmic solution  buprenorphine (BUTRANS) 10 MCG/HR WK patch  buPROPion (WELLBUTRIN XL) 150 MG 24 hr tablet  busPIRone (BUSPAR) 15 MG tablet  cyclobenzaprine (FLEXERIL) 10 MG tablet  diclofenac (VOLTAREN) 1 % topical gel  diphenhydrAMINE (BENADRYL) 50 MG capsule  DULoxetine (CYMBALTA) 60 MG EC capsule  EPINEPHrine (ANY BX GENERIC EQUIV) 0.3 MG/0.3ML injection 2-pack  fexofenadine (ALLEGRA) 180 MG tablet  fluticasone (FLONASE) 50 MCG/ACT nasal spray  gabapentin (NEURONTIN) 300 MG capsule  hydrochlorothiazide (HYDRODIURIL) 12.5 MG tablet  lidocaine (XYLOCAINE) 2 % solution  losartan (COZAAR) 100 MG tablet  medical cannabis (Patient's own supply)  medroxyPROGESTERone (DEPO-PROVERA) 150 MG/ML IM injection  methocarbamol (ROBAXIN) 750 MG tablet  NONFORMULARY  nystatin (MYCOSTATIN) 145741 UNIT/ML suspension  ondansetron (ZOFRAN-ODT) 8 MG ODT tab  oxyCODONE-acetaminophen (PERCOCET) 5-325 MG tablet  pantoprazole (PROTONIX) 40 MG EC tablet  polyethylene glycol (MIRALAX) 17 g packet  prazosin (MINIPRESS) 1 MG capsule  prochlorperazine (COMPAZINE) 10 MG tablet  rizatriptan (MAXALT-MLT) 5 MG ODT  sucralfate (CARAFATE) 1 GM tablet  valACYclovir (VALTREX) 1000 mg tablet          Review of Systems   All  other systems reviewed and are negative.      Physical Exam   BP: (!) 149/92  Pulse: 78  Temp: 98.5  F (36.9  C)  Resp: 16  SpO2: 99 %      Physical Exam  Constitutional:       General: She is not in acute distress.     Appearance: Normal appearance. She is not ill-appearing.   Pulmonary:      Effort: Pulmonary effort is normal. No respiratory distress.   Abdominal:      Tenderness: There is left CVA tenderness (mild/mod).   Neurological:      Mental Status: She is alert.   Psychiatric:         Mood and Affect: Mood normal.         ED Course        Procedures               Critical Care time:  none               Results for orders placed or performed during the hospital encounter of 08/06/20 (from the past 24 hour(s))   UA reflex to Microscopic and Culture    Specimen: Midstream Urine   Result Value Ref Range    Color Urine Yellow     Appearance Urine Cloudy     Glucose Urine Negative NEG^Negative mg/dL    Bilirubin Urine Negative NEG^Negative    Ketones Urine Negative NEG^Negative mg/dL    Specific Gravity Urine 1.017 1.003 - 1.035    Blood Urine Small (A) NEG^Negative    pH Urine 5.0 5.0 - 7.0 pH    Protein Albumin Urine 30 (A) NEG^Negative mg/dL    Urobilinogen mg/dL 0.0 0.0 - 2.0 mg/dL    Nitrite Urine Negative NEG^Negative    Leukocyte Esterase Urine Large (A) NEG^Negative    Source Midstream Urine     RBC Urine 25 (H) 0 - 2 /HPF    WBC Urine 474 (H) 0 - 5 /HPF    Bacteria Urine Few (A) NEG^Negative /HPF    Squamous Epithelial /HPF Urine 2 (H) 0 - 1 /HPF    Mucous Urine Present (A) NEG^Negative /LPF     *Note: Due to a large number of results and/or encounters for the requested time period, some results have not been displayed. A complete set of results can be found in Results Review.       Medications - No data to display    Assessments & Plan (with Medical Decision Making)  30-year-old female with chronic pain has had more back discomfort the last couple weeks and now developed dysuria the past day or 2.  No  fevers chills or sweats and she is had no vomiting.  Recently treated for pinworms and is wondering if she might have worms in her urine.  She brought in a sample from home and a glass jar.  Clean-catch urine was obtained here in the ED and showed large leukocyte esterase, 25 red cells, 475 white cells.  Urine culture is pending.  She does have some mild to moderate left CVA tenderness.  At least has acute cystitis and possibly early pyelonephritis.  No other systemic symptoms so I think we can hold off on blood work for now.  She is afebrile and able to take oral medications.  She has multiple drug allergies but has tolerated both Cipro and Bactrim in the past.  We will start her on Cipro 500 mg twice daily x10 days and modify her antibiotics if culture and sensitivities dictate.  Recheck urine in clinic in 2-3 weeks.  We discussed reportable signs and when to return.  Written discharge instructions given.  She is comfortable with this plan.     I have reviewed the nursing notes.    I have reviewed the findings, diagnosis, plan and need for follow up with the patient.       New Prescriptions    CIPROFLOXACIN (CIPRO) 500 MG TABLET    Take 1 tablet (500 mg) by mouth 2 times daily for 10 days       Final diagnoses:   Acute pyelonephritis       8/6/2020   [facility] EMERGENCY DEPARTMENT     Nathan Swanson MD  08/06/20 0140

## 2020-08-06 NOTE — DISCHARGE INSTRUCTIONS
Take the Cipro twice a day for 10 days.  This should cover a bladder and kidney infection.  We will culture your urine.  If the results dictate a change in your antibiotics, we will contact you.  Recheck urine in clinic in 2-3 weeks to be certain that the infection has cleared.  Return to the ED if you develop fever over 100.4, persistent vomiting so that you cannot keep your antibiotics down, or any concerns.  Most the time we can treat kidney infections with oral antibiotics but every once in a while people end up in the hospital on IV antibiotics.  It was good to see you again tonight.  I hope this settles down quickly for you.    Thank you for choosing [facility]. We appreciate the opportunity to meet your urgent medical needs. Please let us know if we could have done anything to make your stay more satisfying.    After discharge, please closely monitor for any new or worsening symptoms. Return to the Emergency Department if you develop any acute worsening signs or symptoms.    If you had lab work, cultures or imaging studies done during your stay, the final results may still be pending. We will call you if your plan of care needs to change. However, if you are not improving as expected, please follow up with your primary care provider or clinic.     Start any prescription medications that were prescribed to you and take them as directed.     Please see additional handouts that may be pertinent to your condition.

## 2020-08-06 NOTE — TELEPHONE ENCOUNTER
Routed a triage encounter to PCP in another telephone encounter. Please advise.    Deborah Bradley CMA ([facility])

## 2020-08-06 NOTE — ED AVS SNAPSHOT
[facility] Emergency Department  [address] [place] [address]  Phone:  [phone]  Fax:  [phone]                                    Katy Islas   MRN: [id number]    Department:  [facility] Emergency Department   Date of Visit:  8/6/2020           After Visit Summary Signature Page    I have received my discharge instructions, and my questions have been answered. I have discussed any challenges I see with this plan with the nurse or doctor.    ..........................................................................................................................................  Patient/Patient Representative Signature      ..........................................................................................................................................  Patient Representative Print Name and Relationship to Patient    ..................................................               ................................................  Date                                   Time    ..........................................................................................................................................  Reviewed by Signature/Title    ...................................................              ..............................................  Date                                               Time          22EPIC Rev 08/18

## 2020-08-07 ENCOUNTER — TELEPHONE (OUTPATIENT)
Dept: FAMILY MEDICINE | Facility: OTHER | Age: 30
End: 2020-08-07

## 2020-08-07 LAB
BACTERIA SPEC CULT: ABNORMAL
Lab: ABNORMAL
SPECIMEN SOURCE: ABNORMAL

## 2020-08-07 NOTE — TELEPHONE ENCOUNTER
Reason for call:  Symptom  Reason for call:  Patient reporting a symptom    Symptom or request: Was in ED yesterday and given antibiotics. Not feeling well today    Duration (how long have symptoms been present): few days    Have you been treated for this before? Yes    Additional comments: has only taken 2 doses of antibiotic    Phone Number patient can be reached at:  Home number on file [phone] (home)    Best Time:  Any    Can we leave a detailed message on this number:  YES    Call taken on 8/7/2020 at 8:03 AM by Loida Kaiser

## 2020-08-07 NOTE — TELEPHONE ENCOUNTER
There is no need to change the antibiotic. Can use her compazine to help with nausea before taking abx

## 2020-08-07 NOTE — RESULT ENCOUNTER NOTE
Savana Burns    Your results were normal. There is a mild elevation in your cholesterol, this is likely due to your weight. It should improve with weight loss, exercise, and a low-fat diet. We will recheck again in 1 year.     The results are attached for your review.       Zach Rosario PA-C

## 2020-08-07 NOTE — TELEPHONE ENCOUNTER
Spoke with patient via intrepreter services. Was seen in ED yesterday for Urinary issues and was given cipro. She has a temp of 100.8 Has thrown up last night and this am dose of antibiotics.  Has not take any OTC fever reducer.  She states that her urine is worse and has a new fever.    encourage her to try an OTC fever reducer and increase fluids.  provider please advise on any change needed for treatment. (antibtioic injection or ER for IV antibiotic or ?)    patient would like return call.    Iggy Bernstein, BSN, RN, PHN

## 2020-08-10 ENCOUNTER — TRANSFERRED RECORDS (OUTPATIENT)
Dept: HEALTH INFORMATION MANAGEMENT | Facility: CLINIC | Age: 30
End: 2020-08-10

## 2020-08-12 ENCOUNTER — TELEPHONE (OUTPATIENT)
Dept: NUTRITION | Facility: CLINIC | Age: 30
End: 2020-08-12

## 2020-08-12 NOTE — PROGRESS NOTES
Nutrition Note Brief: Telephone Encounter    RD received a nutrition referral for patient on 7.8.2020 from Aura Cronin PA-C was recommended by Dr. Pritchett in regards to Crohns disease of colon with fistula with nutritional instruct Irritable bowel Called Pt today to schedule an appointment. This is the first attempt at contacting patient to set up nutrition appointment.     Spoke with patient and ASL interpretor, pt has Medicare and is checking with insurance prior to scheduling an outpatient RD appointment. RD provided contact information if pt would like to schedule a future outpatient appointment.    Pinky Zapata MBA, RD LD  Clinical Dietitian  [phone]

## 2020-08-13 ENCOUNTER — ANCILLARY PROCEDURE (OUTPATIENT)
Dept: CARDIOLOGY | Facility: CLINIC | Age: 30
End: 2020-08-13
Attending: INTERNAL MEDICINE
Payer: MEDICARE

## 2020-08-13 ENCOUNTER — OFFICE VISIT (OUTPATIENT)
Dept: CARDIOLOGY | Facility: CLINIC | Age: 30
End: 2020-08-13
Attending: PHYSICIAN ASSISTANT
Payer: MEDICARE

## 2020-08-13 VITALS
WEIGHT: 195.4 LBS | DIASTOLIC BLOOD PRESSURE: 86 MMHG | OXYGEN SATURATION: 98 % | BODY MASS INDEX: 36.92 KG/M2 | HEART RATE: 78 BPM | SYSTOLIC BLOOD PRESSURE: 122 MMHG

## 2020-08-13 DIAGNOSIS — R03.0 ELEVATED BP WITHOUT DIAGNOSIS OF HYPERTENSION: ICD-10-CM

## 2020-08-13 DIAGNOSIS — R06.09 EXERTIONAL DYSPNEA: ICD-10-CM

## 2020-08-13 DIAGNOSIS — Z82.49 FAMILY HISTORY OF ISCHEMIC HEART DISEASE: ICD-10-CM

## 2020-08-13 DIAGNOSIS — R00.2 PALPITATIONS: ICD-10-CM

## 2020-08-13 DIAGNOSIS — R00.2 PALPITATIONS: Primary | ICD-10-CM

## 2020-08-13 PROCEDURE — 0298T LEADLESS EKG MONITOR 3 TO 14 DAYS: CPT | Performed by: INTERNAL MEDICINE

## 2020-08-13 PROCEDURE — 99204 OFFICE O/P NEW MOD 45 MIN: CPT | Performed by: INTERNAL MEDICINE

## 2020-08-13 PROCEDURE — 0296T LEADLESS EKG MONITOR 3 TO 14 DAYS: CPT | Performed by: INTERNAL MEDICINE

## 2020-08-13 RX ORDER — HYDROCODONE BITARTRATE AND ACETAMINOPHEN 5; 325 MG/1; MG/1
TABLET ORAL
COMMUNITY
Start: 2020-07-29 | End: 2020-11-04

## 2020-08-13 RX ORDER — CEPHALEXIN 500 MG/1
CAPSULE ORAL
COMMUNITY
Start: 2020-08-10 | End: 2020-08-19

## 2020-08-13 NOTE — PATIENT INSTRUCTIONS
Patient has been prescribed a ZioPatch holter for 14 days. Patient was instructed regarding the indication, function, care and prompt return of the ZioPatch holter monitor. The monitor, with S/N [id number],  was placed on the patient with instructions regarding care of the skin and monitor, as well as documentation in the patient diary. Patient demonstrated understanding of this information and [facility] questions or concerns.

## 2020-08-13 NOTE — PROGRESS NOTES
Chief complaint: hypertension, palpitations    HPI:   Interview was conducted with the aid of a trained medical .     Katy Islas is a 30 year old female with history of multiple medical problems including Crohns disease, HTN, and chronic back pain status post recent spinal stimulator who presents for evaluation of elevated blood pressure and palpitations.    She reports several complaints as detailed below:    1. Elevated blood pressure  2. Elevated heart rate  3. Palpitations  Her home BPs average 130s/80s. She has rare very BPs, up to 170s/110s. Very high BPs do not always coincide with pain or anxiety.    She has intermittent sensation of rapid heartbeat/palpitations, ~1x/week.     3. Bilateral leg swelling: Her legs swell and become painful with walking and throughout the day. She noticed this was particularly bad 2 months ago. Prolonged standing also elicits pain and swelling.   This improves with elevating legs.     4. Exertional dyspnea: She reports exertional dyspnea with threshold for dyspnea of mowing the lawn.     5. Family history of CAD:  Her father had history of heart failure. He had history of HTN and was started on BP meds at 35. He had an MI at 50 and a CABG at 57, subsequently developed HF and ultimately  of HF at 58.   Paternal uncle (living) and grandfather ( at 68) also had history of CAD and HF.   Katy is concerned that she may be at risk for these conditions and that her symptoms may reflect early-onset CAD or heart failure.    ROS is otherwise notable for occasional palpitations, headaches, chronic back/leg/abdominal pain.     she denies any dyspnea at rest, PND, orthopnea, or syncope.       PAST MEDICAL HISTORY:  Past Medical History:   Diagnosis Date     Abdominal pain 10/31- 2005    [facility] admit for Crohn's     Allergic rhinitis, cause unspecified     Allergic rhinitis     Arthritis      C. difficile diarrhea     Past, no  current diarrhea.     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, without mention of intractable epilepsy     pseudoseizures diagnosed after extensive neurologic eval     Migraine 07/21/12    D/C 07/22/12-[facility]     Migraine, unspecified, without mention of intractable migraine without mention of status migrainosus     Migraine     Mild intermittent asthma     mild intermittent     Mycoplasma infection in conditions classified elsewhere and of unspecified site      Other chronic pain     Back pain for 6 years     Renal disease     Kidney stones     Unspecified hearing loss     congenital hearing loss       CURRENT MEDICATIONS:  Current Outpatient Medications   Medication Sig Dispense Refill     acetaminophen (TYLENOL) 500 MG tablet Take 2 tablets (1,000 mg) by mouth 3 times daily as needed for mild pain 100 tablet 3     albuterol (PROAIR HFA/PROVENTIL HFA/VENTOLIN HFA) 108 (90 Base) MCG/ACT inhaler Inhale 2 puffs into the lungs every 6 hours as needed for shortness of breath / dyspnea or wheezing 3 Inhaler 3     albuterol (PROVENTIL) (2.5 MG/3ML) 0.083% neb solution Take 1 vial (2.5 mg) by nebulization every 6 hours as needed for shortness of breath / dyspnea or wheezing 50 vial 11     alum & mag hydroxide-simethicone (MAALOX ADVANCED MAX ST) 400-400-40 MG/5ML SUSP suspension Take 15 mLs by mouth every 4 hours as needed for indigestion (mix with lidocaine) 355 mL 11     ARIPiprazole (ABILIFY) 30 MG tablet Take 1 tablet (30 mg) by mouth At Bedtime 30 tablet 1      aspirin (ASA) 81 MG tablet Take 1 tablet (81 mg) by mouth daily 90 tablet 3     azelastine (OPTIVAR) 0.05 % ophthalmic solution Place 1 drop into both eyes 2 times daily 6 mL 11     Buprenorphine HCl (BELBUCA) 300 MCG FILM buccal film Place 300 mcg inside cheek every 12 hours       buPROPion (WELLBUTRIN XL) 150 MG 24 hr tablet Take 1 tablet (150 mg) by mouth every morning 90 tablet 1     busPIRone (BUSPAR) 15 MG tablet Take 1 tablet (15 mg) by mouth 2 times daily 60 tablet 5     cephALEXin (KEFLEX) 500 MG capsule        ciprofloxacin (CIPRO) 500 MG tablet Take 1 tablet (500 mg) by mouth 2 times daily for 10 days 20 tablet 0     cyclobenzaprine (FLEXERIL) 10 MG tablet Take 1 tablet (10 mg) by mouth 3 times daily as needed for muscle spasms or other (back pain) 90 tablet 3     diphenhydrAMINE (BENADRYL) 50 MG capsule Take 1-2 capsules ( mg) by mouth every 6 hours as needed for itching, allergies or sleep 90 capsule 3     DULoxetine (CYMBALTA) 60 MG EC capsule Take 1 capsule (60 mg) by mouth daily 90 capsule 3     gabapentin (NEURONTIN) 300 MG capsule Take 1 capsule (300 mg) by mouth 3 times daily 270 capsule 3     hydrochlorothiazide (HYDRODIURIL) 12.5 MG tablet Take 1 tablet (12.5 mg) by mouth daily 30 tablet 1     HYDROcodone-acetaminophen (NORCO) 5-325 MG tablet        lidocaine (XYLOCAINE) 2 % solution Swish and swallow 15 mLs in mouth every 4 hours as needed for other (GERD) ; Max 8 doses/24 hour period. Mix with 15 mLs Maalox or Mylanta. 100 mL 3     losartan (COZAAR) 100 MG tablet Take 1 tablet (100 mg) by mouth daily 90 tablet 3     medical cannabis (Patient's own supply) (The purpose of this order is to document that the patient reports taking medical cannabis.  This is not a prescription, and is not used to certify that the patient has a qualifying medical condition.) 0 Information only 0     medroxyPROGESTERone (DEPO-PROVERA) 150 MG/ML IM injection Inject 1 mL (150 mg) into the muscle every 3  months 3 mL 3     methocarbamol (ROBAXIN) 750 MG tablet Take 1 tablet (750 mg) by mouth 3 times daily as needed for muscle spasms 60 tablet 3     NONFORMULARY Apply 30 mLs topically 4 times daily       ondansetron (ZOFRAN-ODT) 8 MG ODT tab Take 1 tablet (8 mg) by mouth every 8 hours as needed for nausea 15 tablet 0     pantoprazole (PROTONIX) 40 MG EC tablet Take 1 tablet (40 mg) by mouth 2 times daily Take 30-60 minutes before a meal. 180 tablet 3     polyethylene glycol (MIRALAX) 17 g packet Take 17 g by mouth 2 times daily 60 packet 0     prazosin (MINIPRESS) 1 MG capsule Take 1 mg by mouth At Bedtime  5     prochlorperazine (COMPAZINE) 10 MG tablet Take 1 tablet (10 mg) by mouth every 6 hours as needed for nausea or vomiting 60 tablet 11     rizatriptan (MAXALT-MLT) 5 MG ODT Take 1-2 tablets (5-10 mg) by mouth at onset of headache for migraine May repeat in 2 hours. Max 6 tablets/24 hours. 18 tablet 3     buprenorphine (BUTRANS) 10 MCG/HR WK patch Place 1 patch onto the skin every 7 days       diclofenac (VOLTAREN) 1 % topical gel Place 2 g onto the skin 4 times daily as needed for moderate pain Stop if any gi upset or symptoms (Patient not taking: Reported on 8/13/2020) 100 g 0     EPINEPHrine (ANY BX GENERIC EQUIV) 0.3 MG/0.3ML injection 2-pack Inject 0.3 mLs (0.3 mg) into the muscle once as needed for anaphylaxis 0.6 mL 3     fexofenadine (ALLEGRA) 180 MG tablet Take 1 tablet (180 mg) by mouth daily (Patient not taking: Reported on 8/13/2020) 90 tablet 3     fluticasone (FLONASE) 50 MCG/ACT nasal spray Spray 1 spray into both nostrils daily (Patient not taking: Reported on 8/13/2020) 16 g 1     nystatin (MYCOSTATIN) 836613 UNIT/ML suspension Take 5 mLs (500,000 Units) by mouth 4 times daily (swish and spit) (Patient not taking: Reported on 8/13/2020) 400 mL 1     oxyCODONE-acetaminophen (PERCOCET) 5-325 MG tablet Take 1-2 tablets by mouth every 4 hours as needed for pain (Patient not taking: Reported on  8/13/2020) 12 tablet 0     sucralfate (CARAFATE) 1 GM tablet Take 1 tablet (1 g) by mouth 4 times daily as needed (heartburn) (Patient not taking: Reported on 8/13/2020) 360 tablet 3     valACYclovir (VALTREX) 1000 mg tablet 2 tabs at onset of mouth sores.  Repeat dose in 12 hours.         PAST SURGICAL HISTORY:  Past Surgical History:   Procedure Laterality Date     AS REMOVAL OF COCCYX  10/18/2017     C FUSION OF SACROILIAC JOINT  10/26/2018     COLONOSCOPY  7/1/2009    [facility].     COLONOSCOPY N/A 7/17/2019    Procedure: Colonoscopy, With Polypectomy And Biopsy;  Surgeon: Edwin Conde MD;  Location: [facility] OR     COLONOSCOPY WITH CO2 INSUFFLATION N/A 7/17/2019    Procedure: COLONOSCOPY, WITH CO2 INSUFFLATION;  Surgeon: Edwin Conde MD;  Location: [facility] OR     COMBINED ESOPHAGOSCOPY, GASTROSCOPY, DUODENOSCOPY (EGD) WITH CO2 INSUFFLATION N/A 4/12/2019    Procedure: COMBINED ESOPHAGOSCOPY, GASTROSCOPY, DUODENOSCOPY (EGD) WITH CO2 INSUFFLATION;  Surgeon: Edwin Conde MD;  Location: [facility] OR     ESOPHAGOSCOPY, GASTROSCOPY, DUODENOSCOPY (EGD), COMBINED N/A 4/12/2019    Procedure: Combined Esophagoscopy, Gastroscopy, Duodenoscopy (Egd), Biopsy Single Or Multiple;  Surgeon: Edwin Conde MD;  Location: [facility] OR     FISTULOTOMY RECTUM N/A 4/30/2020    Procedure: EXAM UNDER ANESTHESIA, ANUS, parital fistulotomy;  Surgeon: Valentin Sanchez MD;  Location: [facility] OR     FUSION SPINE POSTERIOR MINIMALLY INVASIVE ONE LEVEL N/A 2/23/2017    Procedure: FUSION SPINE POSTERIOR MINIMALLY INVASIVE ONE LEVEL;  L4-5 Oblique Lateral Lumbar Interbody Fusion   Epidural steroid injection.   Transpedicular Bone marrow aspiration;  Surgeon: Jeniffer Eugene MD;  Location: [facility] OR     HC COLONOSCOPY THRU STOMA WITH BIOPSY  10/29/2003    Impression is that of normal appearing colonoscopy, without evidence of rectal bleeding.     HC COLONOSCOPY THRU STOMA, DIAGNOSTIC  10/00     normal     HC COLONOSCOPY THRU STOMA, DIAGNOSTIC  Oct 2009    Dr López- normal     HC EEG AWAKE AND SLEEP      abnormal     HC MRI BRAIN W/O CONTRAST  12/00    normal     HC REMOVAL GALLBLADDER  8/5/2009    [facility], [place]     HC UGI ENDOSCOPY DIAG W BIOPSY  11/11/09    Normal esophagus     HC UGI ENDOSCOPY, SIMPLE EXAM  7/00, 10/00    mild chronic esophagitis and duodenitis, neg H pylori     HC UGI ENDOSCOPY, SIMPLE EXAM  01/20/2005    Esophagogastroduodenoscopy, colonoscopy with biopsies.  [facility]     HC UGI ENDOSCOPY, SIMPLE EXAM  7/1/2009    [facility], [place]     HC UGI ENDOSCOPY, SIMPLE EXAM  11/11/2009    attempted upper GI, pt. could not tolerate procedure:[facility]     ORTHOPEDIC SURGERY  October 19,2011    diskectomy L4-L5       ALLERGIES:     Allergies   Allergen Reactions     Iohexol Hives     Other reaction(s): Hives  IV contrast; patient states she tolerates contrast if she gets benadryl before  IV contrast; patient states she tolerates contrast if she gets benadryl before     Ativan [Lorazepam] Hives     At the IV site     Baclofen      hives     Bees Hives, Swelling and Difficulty breathing     Caffeine      Contrast Dye      Hives,   Updated 5/10/2016 CT Contrast.     Dilaudid [Hydromorphone] Itching     Iodine Hives     Methocarbamol Swelling     Metoclopramide      Other reaction(s): Tremors  LW Reaction: shaking/sweating     Midazolam      Other reaction(s): Agitation     Monosodium Glutamate      Morphine Other (See Comments)     Difficulty with urination     Nsaids Other (See Comments)     GI BLEED x2     Other (Do Not Use) Other (See Comments)     Xanaflex- pt becomes disoriented and loses bladder control     Reglan [Metoclopramide Hcl] Other (See Comments)     shaking     Soma [Carisoprodol] Visual Disturbance     Sleep walking     Tizanidine      Topamax Other (See Comments)     Topamax [Topiramate] Nausea      Tingling  GI/Vomit     Tramadol      Severe Headache, Seizure Risk     Tylenol W/Codeine [Acetaminophen-Codeine] Nausea and Itching     Tylenol 3     Versed Other (See Comments)     Zolmitriptan      Makes face feel like its twitching     Droperidol Anxiety     Flu Virus Vaccine Rash      Arm swelling       FAMILY HISTORY:  Her father had history of heart failure. He had history of HTN and was started on BP meds at 35. He had an MI at 50 and a CABG at 56, subsequently developed HF and ultimately  of HF at 58.   Paternal uncle (living) and grandfather ( at 68) also had history of CAD and HF.   Katy is concerned that she may be at risk for these conditions and that her symptoms may reflect early-onset CAD or heart failure.    No family history of premature CAD or sudden death.    SOCIAL HISTORY:  Social History     Tobacco Use     Smoking status: Current Every Day Smoker     Packs/day: 0.25     Years: 5.00     Pack years: 1.25     Types: Cigarettes     Smokeless tobacco: Never Used   Substance Use Topics     Alcohol use: Not Currently     Comment: Not since last 2018     Drug use: Yes     Types: Marijuana     Comment: Medical Marijuana currently-- More CBD       ROS:   A comprehensive 14 point review of systems is negative other than as mentioned in HPI.    Exam:  /86 (BP Location: Left arm, Patient Position: Sitting, Cuff Size: Adult Large)   Pulse 78   Wt 88.6 kg (195 lb 6.4 oz)   SpO2 98%   BMI 36.92 kg/m    GENERAL APPEARANCE: healthy, alert and no distress  EYES: no icterus, no xanthelasmas  ENT: normal palate, mucosa moist, no central cyanosis  NECK: JVP not elevated  RESPIRATORY: lungs clear to auscultation - no rales, rhonchi or wheezes, no use of accessory muscles, no retractions, respirations are unlabored, normal respiratory rate  CARDIOVASCULAR: regular rhythm, normal S1 with physiologic split S2, no S3 or S4 and no murmur, click or rub.  GI: soft, non tender, bowel sounds normal,no  abdominal bruits  EXTREMITIES: no edema, no bruits  NEURO: alert and oriented to person/place/time, normal speech, gait and affect  VASC: Radial, dorsalis pedis and posterior tibialis pulses 2+ bilaterally.  SKIN: no ecchymoses, no rashes.  PSYCH: cooperative, affect appropriate.     Labs:  Reviewed.       Testing/Procedures:  I personally visualized and interpreted:  ECG 4/27/20: normal sinus rhythm    Outside results of note:  Outside records from [facility] and [facility] were obtained, and relevant results/notes have been incorporated into HPI.      Assessment and Plan:   1. Hypertension, controlled at today's visit  2. Family history of CAD and HF   -counseled patient to record BPs at home daily for 14 days and to submit results via [employer] to better assess adequacy of current regimen   -TTE     3. Palpitations   -14-day ZioPatch    4. Lower extremity edema:  Clinically euvolemic and with no pitting edema today. By history, her symptomatology is suggestive of lymphedema or venous insufficiency and a cardiac etiology of edema appears unlikely. Counseled regarding behavioral measures (elevation, etc.) and compressive garments.     Follow up in 2-3 months to review results of the above workup and determine whether further workup or follow up is indicated.     >50% of this 30-minute visit were spent with the patient on in-person counseling and discussion regarding the above-described diagnostic and therapeutic plan, the rationale for it, and answering all questions.     The patient states understanding and is agreeable with plan.     Fransisco Osman MD  Cardiology    CC  TIMO YA

## 2020-08-13 NOTE — NURSING NOTE
Katy Islas's goals for this visit include:   Chief Complaint   Patient presents with     Consult     HTN       She requests these members of her care team be copied on today's visit information: no    PCP: Aura Rosario    Referring Provider:  Aura Rosario PA-C  [address]  [place] [address]    /86 (BP Location: Left arm, Patient Position: Sitting, Cuff Size: Adult Large)   Pulse 78   Wt 88.6 kg (195 lb 6.4 oz)   SpO2 98%   BMI 36.92 kg/m      Do you need any medication refills at today's visit? No    Iens Gomez CMA......August 13, 2020     2:44 PM

## 2020-08-13 NOTE — PATIENT INSTRUCTIONS
You were seen at the [facility] clinic today.  You saw Dr. Fransisco Osman  Here are your Instructions:    1. 14-day ziopatch  2. Echocardiogram   3. Follow up with Dr. Osman in 2-3 months.

## 2020-08-14 ENCOUNTER — TELEPHONE (OUTPATIENT)
Dept: CARDIOLOGY | Facility: CLINIC | Age: 30
End: 2020-08-14

## 2020-08-14 ENCOUNTER — TRANSFERRED RECORDS (OUTPATIENT)
Dept: HEALTH INFORMATION MANAGEMENT | Facility: CLINIC | Age: 30
End: 2020-08-14

## 2020-08-14 NOTE — PROGRESS NOTES
"Katy Islas is a 30 year old female who is being evaluated via a billable telephone visit.      The patient has been notified of following:     \"This telephone visit will be conducted via a call between you and your physician/provider. We have found that certain health care needs can be provided without the need for a physical exam.  This service lets us provide the care you need with a short phone conversation.  If a prescription is necessary we can send it directly to your pharmacy.  If lab work is needed we can place an order for that and you can then stop by our lab to have the test done at a later time.    Telephone visits are billed at different rates depending on your insurance coverage. During this emergency period, for some insurers they may be billed the same as an in-person visit.  Please reach out to your insurance provider with any questions.    If during the course of the call the physician/provider feels a telephone visit is not appropriate, you will not be charged for this service.\"    Patient has given verbal consent for Telephone visit?  Yes    What phone number would you like to be contacted at? [phone]     How would you like to obtain your AVS? Elsat    Alex     Katy Islas is a 30 year old female who presents via phone visit today for the following health issues:    HPI   Swollen Legs   - Right hand swollen as well   - Started 2 days ago  - right after spinal cord stimulator was removed      Did talk to [employer] and they don't think related   - Doesn't feel like BP is up, BP cuff is not working (battery dead)  - No chest pain or shortness of breath   - Back doing much better   - Spinal cord stimulator test went well, took out on Monday, permanent one going in on 9/8  - Doesn't like to sit, moves around a lot   - Redness      Few different spots      When squeezes them hurts a lot   - Stays pushed in for awhile when presses fingers in   - No medication changes  - Not warm   - " If tries to kneel on floor feels like they are going to pop   - No changes to diet, no salt in diet   - Putting legs up makes feel a little better at first, but not now   - Now don't even go down overnight   - This AM might have been a little weeping on both ankles   - Recently had apt with cardiology      TTE, Ziopatch, etc.   - Goes past the knee     Mood   - Doing well   - [facility] psychiatrist, has apt next Tuesday   - Counseling still going well too              Cardiology NOTE     Clinically euvolemic and with no pitting edema today. By history, her symptomatology is suggestive of lymphedema or venous insufficiency and a cardiac etiology of edema appears unlikely. Counseled regarding behavioral measures (elevation, etc.) and compressive garments.       RN NOTE from today   Claudine Jimenez RN     8/19/20 7:16 AM      I spoke with the pt who states her legs feel really swollen. Her feet really hurt when she walks. Started 2 days ago. She has had swelling before but with elevating the swelling would improve but not this time. Bug bites around her ankles are oozing, they are itching. She has been using Cortizone. Temp 100.3 yesterday. She does not remember hitting her leg or how she got the bruising. She states her blood pressure machine is not working right now. Pt states she went to [facility], She had a nice walker and they are not able to find it. She states she needs a new walker. No SOB and No chest pain. She will discuss all of this with CD[facility] at her appt today. She states she just bought a futon yesterday so she is able to elevate her legs and watch a movie.      Routing to CDL as LJ, see mychart pictures of swollen legs     Claudine Jimenez, MSN, RN            Reviewed and updated as needed this visit by Provider  Allergies  Meds  Problems  Med Hx  Surg Hx         Review of Systems   Constitutional, HEENT, cardiovascular, pulmonary, gi and gu systems are negative, except as otherwise noted.    "    Objective      Vitals:  No vitals were obtained today due to virtual visit.  healthy, alert and no distress  PSYCH: Alert and oriented times 3; coherent speech, normal   rate and volume, able to articulate logical thoughts, able   to abstract reason, no tangential thoughts, no hallucinations   or delusions  Her affect is normal  RESP: No cough, no audible wheezing, able to talk in full sentences    SKIN: see scanned in pictures from patient     Remainder of exam unable to be completed due to telephone visits      Diagnostic Test Results:  Labs reviewed in Epic  none         Assessment/Plan:    Assessment & Plan     ICD-10-CM    1. Edema, unspecified type  R60.9 Compression Sleeve/Stocking Order for DME - ONLY FOR DME   2. Hypertension goal BP (blood pressure) < 130/80  I10    3. Chronic bilateral low back pain with left-sided sciatica  M54.42     G89.29    4. Mild major depression (H)  F32.0    5. Adjustment disorder with mixed anxiety and depressed mood  F43.23      1. Edema   - Has had on and off, seems worse past 2 days, see scanned in pictures   - Was seen by cardiology recently, no cardiac etiology, is still awaiting 14 day ziopatch   - BP controlled, but at home cuff not checked in past 2 days   - Discussed possible etiology lymphedema vs. Venous insufficiency   - Discussed (as cardiology also recommended) compression stockings, elevation, etc.   - Will send DME order for thigh high compression stockings      I also recommended exercise and weight loss   - Also recommended PRN Lasix for really bad and when weeping, patient declined this, discussed how could help her not have the leg pain     She stated \"I am on too many medications, I just don't want it\"     She then started crying (per interpretor) and refused to look at camera     She then hung up on provider       2. HTN   - Per patient controlled, did have cardiology visit     3. Back pain   - Working with [employer], spinal cord stimulator trial went " well, getting permanent in Sept  - Advised making sure she is completely healed from this before proceeding with hysterectomy     4 & 5. Mood   - Patient states doing well   - Counseling weekly, very helpful   - Has apt next week with psychiatry at [facility]       - As above, patient hung up prior to visit being complete     Due to language barrier, an  was present during the history-taking and subsequent discussion with this patient.    The patient indicates understanding of these issues and agrees with the plan.    Return in about 2 weeks (around 9/2/2020) for Recheck.    Aura Rosario PA-C   [facility]    Phone call duration:  25 minutes and 45 seconds

## 2020-08-14 NOTE — TELEPHONE ENCOUNTER
Patient calling with  (hearing impaired). She states that her BP this morning was 158/100. She had taken her hydrochlorothiazide and losartan at 5:30 am and checked it at 10:30 am. She does say she is having a lot of back pain from a recent surgery. States she is tired and her legs are a little shaky. No other symptoms. She has not eaten yet.   Suggested she eat a little something and have some fluids. If she has something to take for pain relief, she should take that also. She should recheck her BP in an hour after resting prior to 15 min and if it is >180/110 she will go to the ED, or if she develops any headaches or visual changes.     MAUREEN Casas

## 2020-08-15 ENCOUNTER — HOSPITAL ENCOUNTER (EMERGENCY)
Facility: CLINIC | Age: 30
Discharge: HOME | End: 2020-08-16
Attending: PHYSICIAN ASSISTANT | Admitting: PHYSICIAN ASSISTANT
Payer: MEDICARE

## 2020-08-15 DIAGNOSIS — R11.0 NAUSEA: ICD-10-CM

## 2020-08-15 DIAGNOSIS — K62.5 RECTAL BLEEDING: ICD-10-CM

## 2020-08-15 LAB
ANION GAP SERPL CALCULATED.3IONS-SCNC: 5 MMOL/L (ref 3–14)
BASOPHILS # BLD AUTO: 0 10E9/L (ref 0–0.2)
BASOPHILS NFR BLD AUTO: 0.5 %
BUN SERPL-MCNC: 14 MG/DL (ref 7–30)
CALCIUM SERPL-MCNC: 9.1 MG/DL (ref 8.5–10.1)
CHLORIDE SERPL-SCNC: 107 MMOL/L (ref 94–109)
CO2 SERPL-SCNC: 27 MMOL/L (ref 20–32)
CREAT SERPL-MCNC: 0.96 MG/DL (ref 0.52–1.04)
CRP SERPL-MCNC: 4 MG/L (ref 0–8)
DIFFERENTIAL METHOD BLD: ABNORMAL
EOSINOPHIL NFR BLD AUTO: 2.1 %
ERYTHROCYTE [DISTWIDTH] IN BLOOD BY AUTOMATED COUNT: 11.9 % (ref 10–15)
GFR SERPL CREATININE-BSD FRML MDRD: 79 ML/MIN/{1.73_M2}
GLUCOSE SERPL-MCNC: 72 MG/DL (ref 70–99)
HCT VFR BLD AUTO: 40.8 % (ref 35–47)
HGB BLD-MCNC: 14.5 G/DL (ref 11.7–15.7)
IMM GRANULOCYTES # BLD: 0 10E9/L (ref 0–0.4)
IMM GRANULOCYTES NFR BLD: 0.2 %
LYMPHOCYTES # BLD AUTO: 1.8 10E9/L (ref 0.8–5.3)
LYMPHOCYTES NFR BLD AUTO: 41.2 %
MCH RBC QN AUTO: 34 PG (ref 26.5–33)
MCHC RBC AUTO-ENTMCNC: 35.5 G/DL (ref 31.5–36.5)
MCV RBC AUTO: 96 FL (ref 78–100)
MONOCYTES # BLD AUTO: 0.6 10E9/L (ref 0–1.3)
MONOCYTES NFR BLD AUTO: 13.3 %
NEUTROPHILS # BLD AUTO: 1.8 10E9/L (ref 1.6–8.3)
NEUTROPHILS NFR BLD AUTO: 42.7 %
NRBC # BLD AUTO: 0 10*3/UL
NRBC BLD AUTO-RTO: 0 /100
PLATELET # BLD AUTO: 250 10E9/L (ref 150–450)
POTASSIUM SERPL-SCNC: 3.6 MMOL/L (ref 3.4–5.3)
RBC # BLD AUTO: 4.27 10E12/L (ref 3.8–5.2)
SODIUM SERPL-SCNC: 139 MMOL/L (ref 133–144)
WBC # BLD AUTO: 4.3 10E9/L (ref 4–11)

## 2020-08-15 PROCEDURE — 96375 TX/PRO/DX INJ NEW DRUG ADDON: CPT | Performed by: PHYSICIAN ASSISTANT

## 2020-08-15 PROCEDURE — 25000132 ZZH RX MED GY IP 250 OP 250 PS 637: Mod: GY | Performed by: PHYSICIAN ASSISTANT

## 2020-08-15 PROCEDURE — 86140 C-REACTIVE PROTEIN: CPT | Performed by: PHYSICIAN ASSISTANT

## 2020-08-15 PROCEDURE — 99284 EMERGENCY DEPT VISIT MOD MDM: CPT | Mod: 25 | Performed by: PHYSICIAN ASSISTANT

## 2020-08-15 PROCEDURE — 85025 COMPLETE CBC W/AUTO DIFF WBC: CPT | Performed by: PHYSICIAN ASSISTANT

## 2020-08-15 PROCEDURE — 80048 BASIC METABOLIC PNL TOTAL CA: CPT | Performed by: PHYSICIAN ASSISTANT

## 2020-08-15 PROCEDURE — 96374 THER/PROPH/DIAG INJ IV PUSH: CPT | Performed by: PHYSICIAN ASSISTANT

## 2020-08-15 PROCEDURE — 99284 EMERGENCY DEPT VISIT MOD MDM: CPT | Mod: Z6 | Performed by: PHYSICIAN ASSISTANT

## 2020-08-15 PROCEDURE — 25000128 H RX IP 250 OP 636: Performed by: PHYSICIAN ASSISTANT

## 2020-08-15 RX ORDER — ONDANSETRON 2 MG/ML
4 INJECTION INTRAMUSCULAR; INTRAVENOUS EVERY 30 MIN PRN
Status: DISCONTINUED | OUTPATIENT
Start: 2020-08-15 | End: 2020-08-16 | Stop reason: HOSPADM

## 2020-08-15 RX ORDER — HYDROCODONE BITARTRATE AND ACETAMINOPHEN 5; 325 MG/1; MG/1
1 TABLET ORAL ONCE
Status: COMPLETED | OUTPATIENT
Start: 2020-08-15 | End: 2020-08-15

## 2020-08-15 RX ORDER — DIPHENHYDRAMINE HYDROCHLORIDE 50 MG/ML
25 INJECTION INTRAMUSCULAR; INTRAVENOUS ONCE
Status: COMPLETED | OUTPATIENT
Start: 2020-08-15 | End: 2020-08-15

## 2020-08-15 RX ADMIN — ONDANSETRON 4 MG: 2 INJECTION INTRAMUSCULAR; INTRAVENOUS at 23:33

## 2020-08-15 RX ADMIN — DIPHENHYDRAMINE HYDROCHLORIDE 25 MG: 50 INJECTION, SOLUTION INTRAMUSCULAR; INTRAVENOUS at 23:49

## 2020-08-15 RX ADMIN — HYDROCODONE BITARTRATE AND ACETAMINOPHEN 1 TABLET: 5; 325 TABLET ORAL at 23:52

## 2020-08-15 NOTE — ED AVS SNAPSHOT
[facility] Emergency Department  [address] [place] [address]  Phone:  [phone]  Fax:  [phone]                                    Katy Islas   MRN: [id number]    Department:  [facility] Emergency Department   Date of Visit:  8/15/2020           After Visit Summary Signature Page    I have received my discharge instructions, and my questions have been answered. I have discussed any challenges I see with this plan with the nurse or doctor.    ..........................................................................................................................................  Patient/Patient Representative Signature      ..........................................................................................................................................  Patient Representative Print Name and Relationship to Patient    ..................................................               ................................................  Date                                   Time    ..........................................................................................................................................  Reviewed by Signature/Title    ...................................................              ..............................................  Date                                               Time          22EPIC Rev 08/18

## 2020-08-16 VITALS
RESPIRATION RATE: 20 BRPM | DIASTOLIC BLOOD PRESSURE: 93 MMHG | OXYGEN SATURATION: 99 % | TEMPERATURE: 98.4 F | SYSTOLIC BLOOD PRESSURE: 127 MMHG | HEART RATE: 90 BPM

## 2020-08-16 NOTE — ED PROVIDER NOTES
History     Chief Complaint   Patient presents with     Abdominal Pain     Rectal Bleeding     HPI     Katy Islas is a 30 year old female who presents to the emergency department complaining of abdominal pain, nausea, and bloody stools. The patient reports she started having some mild lower abdominal pain today.  When she had 2 bowel movements today she noticed some bright red blood mixed in with her stools and when wiping.  She has also had episodes of nausea for which she took Zofran which helped.  She took some hydrocodone which she had at home which helped her abdominal pain a little as well.  She has not had any fevers, vomiting, or diarrhea.  She denies any urinary symptoms but was treated for kidney infection a couple weeks ago.  She just had a spinal stimulator placed 3 days ago and he is having some back pain from that but otherwise denies any other symptoms.    Patient is deaf, refused an .  Interview somewhat limited because of this but patient was able to communicate her concerns today to me and answer questions with lip reading.    Allergies:  Allergies   Allergen Reactions     Iohexol Hives     Other reaction(s): Hives  IV contrast; patient states she tolerates contrast if she gets benadryl before  IV contrast; patient states she tolerates contrast if she gets benadryl before     Ativan [Lorazepam] Hives     At the IV site     Baclofen      hives     Bees Hives, Swelling and Difficulty breathing     Caffeine      Contrast Dye      Hives,   Updated 5/10/2016 CT Contrast.     Dilaudid [Hydromorphone] Itching     Iodine Hives     Methocarbamol Swelling     Metoclopramide      Other reaction(s): Tremors  LW Reaction: shaking/sweating     Midazolam      Other reaction(s): Agitation     Monosodium Glutamate      Morphine Other (See Comments)     Difficulty with urination     Nsaids Other (See Comments)     GI BLEED x2     Other (Do Not Use) Other (See Comments)     Xanaflex- pt becomes  disoriented and loses bladder control     Reglan [Metoclopramide Hcl] Other (See Comments)     shaking     Soma [Carisoprodol] Visual Disturbance     Sleep walking     Tizanidine      Topamax Other (See Comments)     Topamax [Topiramate] Nausea     Tingling  GI/Vomit     Tramadol      Severe Headache, Seizure Risk     Tylenol W/Codeine [Acetaminophen-Codeine] Nausea and Itching     Tylenol 3     Versed Other (See Comments)     Zolmitriptan      Makes face feel like its twitching     Droperidol Anxiety     Flu Virus Vaccine Rash      Arm swelling       Problem List:    Patient Active Problem List    Diagnosis Date Noted     Patellofemoral pain syndrome of both knees 06/11/2020     Priority: Medium     Primary osteoarthritis of both knees 06/11/2020     Priority: Medium     Abscess of anal and rectal regions 04/27/2020     Priority: Medium     Added automatically from request for surgery [id number]       Rectal pain 04/19/2020     Priority: Medium     Abdominal pain 04/18/2020     Priority: Medium     Morbid obesity (H) 03/10/2020     Priority: Medium     Abnormal uterine bleeding 02/12/2020     Priority: Medium     Added automatically from request for surgery [id number]       Anal fissure 02/04/2020     Priority: Medium     Low hematocrit 02/04/2020     Priority: Medium     Elevated d-dimer 02/04/2020     Priority: Medium     Hypertension goal BP (blood pressure) < 130/80 08/05/2019     Priority: Medium     Bulge of cervical disc without myelopathy 04/24/2019     Priority: Medium     Cervicalgia 04/17/2019     Priority: Medium     Class 1 obesity due to excess calories without serious comorbidity with body mass index (BMI) of 32.0 to 32.9 in adult 04/03/2019     Priority: Medium     Hypophosphatemia 11/01/2018     Priority: Medium     Patellar maltracking, right 09/05/2018     Priority: Medium     Right anterior knee pain 09/05/2018     Priority: Medium     Melanocytic nevus, unspecified location 07/23/2018     Priority:  Medium     Dysplastic skin lesion 07/23/2018     Priority: Medium     Iliotibial band syndrome affecting lower leg, right 12/21/2017     Priority: Medium     Chondromalacia of right patellofemoral joint 12/21/2017     Priority: Medium     Upper GI bleed - suspected 07/01/2017     Priority: Medium     Tobacco use disorder 07/01/2017     Priority: Medium     History of pseudoseizure 07/01/2017     Priority: Medium     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) 03/10/2017     Priority: Medium     Lumbar disc disease with radiculopathy 02/23/2017     Priority: Medium     S/P lumbar fusion 02/23/2017     Priority: Medium     Dr. YOVANI Millan, 2/23/17       Vitamin D deficiency 01/06/2017     Priority: Medium     Atypical mole 01/06/2017     Priority: Medium     Mild persistent asthma without complication 12/02/2016     Priority: Medium     Chronic bilateral low back pain with left-sided sciatica 12/02/2016     Priority: Medium     Bee sting allergy 09/16/2016     Priority: Medium     Gastroesophageal reflux disease without esophagitis 08/26/2016     Priority: Medium     Herpes simplex virus infection 11/12/2015     Priority: Medium     Adjustment disorder with mixed anxiety and depressed mood 10/14/2015     Priority: Medium     Marijuana abuse 02/22/2015     Priority: Medium     Bipolar disorder (H) 01/31/2013     Priority: Medium     Problem list name updated by automated process. Provider to review       Chronic pain 02/09/2012     Priority: Medium     Patient is followed by Aura Rosario PA-C for ongoing prescription of pain medication.  All refills should only be approved by this provider, or covering partner.    Medication(s): Norco    Maximum quantity per month: 70  Clinic visit frequency required: Q 2 months     Controlled substance agreement:   Discussed and signed 4/25/17    Encounter-Level CSA - 01/12/2015:                 Controlled Substance Agreement - Scan on 1/26/2015  9:14 AM :  Controlled Medication Agreement-01/12/15 (below)            Pain Clinic evaluation in the past: Yes       Date/Location:   [facility], fall 2016    DIRE Total Score(s):    4/25/2017   Total Score 17       Last [facility] website verification:  done on 4/25/17 by LOVE Maki   [email or web address]           Anxiety 09/22/2011     Priority: Medium     Mild major depression (H) 08/29/2011     Priority: Medium     Mild intermittent asthma 10/12/2009     Priority: Medium     Overview:   Formatting of this note might be different from the original.  Action plan: See letter 4/10/2013   ATAQ:   Asthma Data 4/10/2013   Date completed 4/10/2013   Missed daily activities no = 0   Wake at night no = 0   Believe asthma in control yes = 0   ARIN use yes   Maximum ARIN use in 1 day 1-4 = 0   ATAQ score 0 = well controlled   Asthma ED visits in past 12 mos 0   Asthma hospitalizations in past 12 mos 0     Current Outpatient Prescriptions   Medication Sig     albuterol (PROVENTIL) 0.083 % neb solution Inhale 3 mL via a nebulizer every 4 hours if needed for Shortness Of Breath.        Crohn's colitis (H) 08/04/2009     Priority: Medium     Dysmenorrhea 05/11/2007     Priority: Medium     Benign neoplasm of skin of lower limb, including hip 03/16/2004     Priority: Medium     Migraine      Priority: Medium     Dr. Farrar - Neurology.  Now on Inderal.  Seems to be working.  Follow-up 9/08  Problem list name updated by automated process. Provider to review       Hearing loss      Priority: Medium     Congenital  Problem list name updated by automated process. Provider to review       Allergic rhinitis      Priority: Medium     Problem list name updated by automated process. Provider to review          Past Medical History:    Past Medical History:   Diagnosis Date     Abdominal pain 10/31- 11/4/2005     Allergic rhinitis, cause unspecified      Arthritis      C. 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, without mention of intractable epilepsy      Migraine 07/21/12     Migraine, unspecified, without mention of intractable migraine without mention of status migrainosus      Mild intermittent asthma      Mycoplasma infection in conditions classified elsewhere and of unspecified site      Other chronic pain      Renal disease      Unspecified hearing loss        Past Surgical History:    Past Surgical History:   Procedure Laterality Date     AS REMOVAL OF COCCYX  10/18/2017     C FUSION OF SACROILIAC JOINT  10/26/2018     COLONOSCOPY  7/1/2009    [facility], [facility].     COLONOSCOPY N/A 7/17/2019    Procedure: Colonoscopy, With Polypectomy And Biopsy;  Surgeon: Edwin Conde MD;  Location: [facility] OR     COLONOSCOPY WITH CO2 INSUFFLATION N/A 7/17/2019    Procedure: COLONOSCOPY, WITH CO2 INSUFFLATION;  Surgeon: Edwin Conde MD;  Location: [facility] OR     COMBINED ESOPHAGOSCOPY, GASTROSCOPY, DUODENOSCOPY (EGD) WITH CO2 INSUFFLATION N/A 4/12/2019    Procedure: COMBINED ESOPHAGOSCOPY, GASTROSCOPY, DUODENOSCOPY (EGD) WITH CO2 INSUFFLATION;  Surgeon: Edwin Conde MD;  Location: [facility] OR     ESOPHAGOSCOPY, GASTROSCOPY, DUODENOSCOPY (EGD), COMBINED N/A 4/12/2019    Procedure: Combined Esophagoscopy, Gastroscopy, Duodenoscopy (Egd), Biopsy Single Or Multiple;  Surgeon: Edwin Conde MD;  Location: [facility] OR     FISTULOTOMY RECTUM N/A 4/30/2020    Procedure: EXAM UNDER ANESTHESIA, ANUS, parital fistulotomy;  Surgeon: Valentin Sanchez MD;  Location: [facility]     FUSION SPINE POSTERIOR MINIMALLY INVASIVE ONE LEVEL N/A 2/23/2017    Procedure: FUSION SPINE POSTERIOR MINIMALLY INVASIVE ONE  LEVEL;  L4-5 Oblique Lateral Lumbar Interbody Fusion   Epidural steroid injection.   Transpedicular Bone marrow aspiration;  Surgeon: Jeniffer Eugene MD;  Location: [facility] OR     HC COLONOSCOPY THRU STOMA WITH BIOPSY  10/29/2003    Impression is that of normal appearing colonoscopy, without evidence of rectal bleeding.     HC COLONOSCOPY THRU STOMA, DIAGNOSTIC  10/00    normal     HC COLONOSCOPY THRU STOMA, DIAGNOSTIC  Oct 2009    Dr López- normal     HC EEG AWAKE AND SLEEP      abnormal     HC MRI BRAIN W/O CONTRAST  12/00    normal     HC REMOVAL GALLBLADDER  8/5/2009    [facility], [facility].     HC UGI ENDOSCOPY DIAG W BIOPSY  11/11/09    Normal esophagus     HC UGI ENDOSCOPY, SIMPLE EXAM  7/00, 10/00    mild chronic esophagitis and duodenitis, neg H pylori     HC UGI ENDOSCOPY, SIMPLE EXAM  01/20/2005    Esophagogastroduodenoscopy, colonoscopy with biopsies.  [facility] UGI ENDOSCOPY, SIMPLE EXAM  7/1/2009    [facility], [facility].      UGI ENDOSCOPY, SIMPLE EXAM  11/11/2009    attempted upper GI, pt. could not tolerate procedure:[facility]     ORTHOPEDIC SURGERY  October 19,2011    diskectomy L4-L5       Family History:    Family History   Problem Relation Age of Onset     Gastrointestinal Disease Brother         severe Crohn's     Neurologic Disorder Brother         Seizures post head injury     Depression Brother      Substance Abuse Brother      Genitourinary Problems Father         kidney stones     Diabetes Father      Heart Disease Father         Open heart surgery     Breast Cancer Maternal Grandmother      Parkinsonism Maternal Grandmother      Cerebrovascular Disease Paternal Grandmother      Cancer Maternal Grandfather         Lung     Cardiovascular Paternal Grandfather         Heart Attack     Substance Abuse Mother         in recovery x1 year      Lung Cancer Paternal Uncle      Cancer Paternal Uncle      Lung Cancer Maternal Uncle         Social History:  Marital Status:  Single [1]  Social History     Tobacco Use     Smoking status: Current Every Day Smoker     Packs/day: 0.25     Years: 5.00     Pack years: 1.25     Types: Cigarettes     Smokeless tobacco: Never Used   Substance Use Topics     Alcohol use: Not Currently     Comment: Not since last July 2018     Drug use: Yes     Types: Marijuana     Comment: Medical Marijuana currently-- More CBD        Medications:    acetaminophen (TYLENOL) 500 MG tablet  albuterol (PROAIR HFA/PROVENTIL HFA/VENTOLIN HFA) 108 (90 Base) MCG/ACT inhaler  albuterol (PROVENTIL) (2.5 MG/3ML) 0.083% neb solution  alum & mag hydroxide-simethicone (MAALOX ADVANCED MAX ST) 400-400-40 MG/5ML SUSP suspension  ARIPiprazole (ABILIFY) 30 MG tablet  aspirin (ASA) 81 MG tablet  azelastine (OPTIVAR) 0.05 % ophthalmic solution  buprenorphine (BUTRANS) 10 MCG/HR WK patch  Buprenorphine HCl (BELBUCA) 300 MCG FILM buccal film  buPROPion (WELLBUTRIN XL) 150 MG 24 hr tablet  busPIRone (BUSPAR) 15 MG tablet  cephALEXin (KEFLEX) 500 MG capsule  ciprofloxacin (CIPRO) 500 MG tablet  cyclobenzaprine (FLEXERIL) 10 MG tablet  diclofenac (VOLTAREN) 1 % topical gel  diphenhydrAMINE (BENADRYL) 50 MG capsule  DULoxetine (CYMBALTA) 60 MG EC capsule  EPINEPHrine (ANY BX GENERIC EQUIV) 0.3 MG/0.3ML injection 2-pack  fexofenadine (ALLEGRA) 180 MG tablet  fluticasone (FLONASE) 50 MCG/ACT nasal spray  gabapentin (NEURONTIN) 300 MG capsule  hydrochlorothiazide (HYDRODIURIL) 12.5 MG tablet  HYDROcodone-acetaminophen (NORCO) 5-325 MG tablet  lidocaine (XYLOCAINE) 2 % solution  losartan (COZAAR) 100 MG tablet  medical cannabis (Patient's own supply)  medroxyPROGESTERone (DEPO-PROVERA) 150 MG/ML IM injection  methocarbamol (ROBAXIN) 750 MG tablet  NONFORMULARY  nystatin (MYCOSTATIN) 705752 UNIT/ML suspension  ondansetron (ZOFRAN-ODT) 8 MG ODT tab  oxyCODONE-acetaminophen (PERCOCET) 5-325 MG tablet  pantoprazole (PROTONIX) 40 MG EC tablet  polyethylene  glycol (MIRALAX) 17 g packet  prazosin (MINIPRESS) 1 MG capsule  prochlorperazine (COMPAZINE) 10 MG tablet  rizatriptan (MAXALT-MLT) 5 MG ODT  sucralfate (CARAFATE) 1 GM tablet  valACYclovir (VALTREX) 1000 mg tablet          Review of Systems   All other systems reviewed and are negative.      Physical Exam   BP: 117/81  Pulse: 101  Heart Rate: 106  Temp: 98.4  F (36.9  C)  Resp: 18  SpO2: 97 %      Physical Exam  Vitals signs and nursing note reviewed.   Constitutional:       General: She is not in acute distress.     Appearance: She is well-developed. She is obese. She is not ill-appearing, toxic-appearing or diaphoretic.   HENT:      Head: Normocephalic and atraumatic.   Eyes:      Conjunctiva/sclera: Conjunctivae normal.      Pupils: Pupils are equal, round, and reactive to light.   Neck:      Musculoskeletal: Neck supple.   Cardiovascular:      Rate and Rhythm: Normal rate and regular rhythm.      Heart sounds: Normal heart sounds.   Pulmonary:      Effort: Pulmonary effort is normal. No respiratory distress.      Breath sounds: Normal breath sounds.   Abdominal:      General: Abdomen is flat. Bowel sounds are normal. There is no distension.      Palpations: Abdomen is soft.      Tenderness: There is no abdominal tenderness. There is no right CVA tenderness or left CVA tenderness.      Comments: No significant tenderness through the abdomen.   Genitourinary:     Comments: Exam of the anal area reveals no external hemorrhoids or evidence of bleeding.  Musculoskeletal:         General: No deformity.      Comments: Bandages to back noted status-post back stimulator placement 3 days ago.   Skin:     General: Skin is warm and dry.   Neurological:      Mental Status: She is alert and oriented to person, place, and time.      Coordination: Coordination normal.         ED Course        Procedures      Results for orders placed or performed during the hospital encounter of 08/15/20 (from the past 24 hour(s))   CBC with  platelets differential   Result Value Ref Range    WBC 4.3 4.0 - 11.0 10e9/L    RBC Count 4.27 3.8 - 5.2 10e12/L    Hemoglobin 14.5 11.7 - 15.7 g/dL    Hematocrit 40.8 35.0 - 47.0 %    MCV 96 78 - 100 fl    MCH 34.0 (H) 26.5 - 33.0 pg    MCHC 35.5 31.5 - 36.5 g/dL    RDW 11.9 10.0 - 15.0 %    Platelet Count 250 150 - 450 10e9/L    Diff Method Automated Method     % Neutrophils 42.7 %    % Lymphocytes 41.2 %    % Monocytes 13.3 %    % Eosinophils 2.1 %    % Basophils 0.5 %    % Immature Granulocytes 0.2 %    Nucleated RBCs 0 0 /100    Absolute Neutrophil 1.8 1.6 - 8.3 10e9/L    Absolute Lymphocytes 1.8 0.8 - 5.3 10e9/L    Absolute Monocytes 0.6 0.0 - 1.3 10e9/L    Absolute Basophils 0.0 0.0 - 0.2 10e9/L    Abs Immature Granulocytes 0.0 0 - 0.4 10e9/L    Absolute Nucleated RBC 0.0    Basic metabolic panel   Result Value Ref Range    Sodium 139 133 - 144 mmol/L    Potassium 3.6 3.4 - 5.3 mmol/L    Chloride 107 94 - 109 mmol/L    Carbon Dioxide 27 20 - 32 mmol/L    Anion Gap 5 3 - 14 mmol/L    Glucose 72 70 - 99 mg/dL    Urea Nitrogen 14 7 - 30 mg/dL    Creatinine 0.96 0.52 - 1.04 mg/dL    GFR Estimate 79 >60 mL/min/[1.73_m2]    GFR Estimate If Black >90 >60 mL/min/[1.73_m2]    Calcium 9.1 8.5 - 10.1 mg/dL   CRP inflammation   Result Value Ref Range    CRP Inflammation 4.0 0.0 - 8.0 mg/L     *Note: Due to a large number of results and/or encounters for the requested time period, some results have not been displayed. A complete set of results can be found in Results Review.       Medications   ondansetron (ZOFRAN) injection 4 mg (4 mg Intravenous Given 8/15/20 4766)   HYDROcodone-acetaminophen (NORCO) 5-325 MG per tablet 1 tablet (1 tablet Oral Given 8/15/20 9700)   diphenhydrAMINE (BENADRYL) injection 25 mg (25 mg Intravenous Given 8/15/20 4978)       Assessments & Plan (with Medical Decision Making)  Katy Islas is a 30 year old female who presented to the ED complaining of abdominal pain, nausea, and blood in her  stools that occurred today.  Denies any fever, vomiting, or diarrhea.  On arrival to the ED her vital signs are unremarkable.  Exam today demonstrated no significant tenderness through the abdomen.  Anal exam without evidence of external hemorrhoids or bleeding.  She did have a neurostimulator in her back that was placed a few days ago.  Patient had a colonoscopy and endoscopy in 2019 which demonstrated internal hemorrhoids but otherwise no acute abnormalities.  Based on what she is describing I have a suspicion that this is most likely internal hemorrhoids.  We did check a few labs to evaluate for infection, white count and CRP were both within normal limits.  Her hemoglobin was also normal.  I discussed these results with the patient and she was overall reassured.  She did receive Zofran, Benadryl for nausea and Norco for pain while here with improvement.  I do think it is reasonable for her to discharge home with plan to monitor things over the next couple days.  She was provided instructions on when to return to the ED, otherwise can follow-up as needed in the clinic for ongoing symptoms.  All questions were answered and patient was discharged home in suitable condition.     I have reviewed the nursing notes.    I have reviewed the findings, diagnosis, plan and need for follow up with the patient.    New Prescriptions    No medications on file       Final diagnoses:   Rectal bleeding   Nausea     Note: Chart documentation done in part with Dragon Voice Recognition software. Although reviewed after completion, some word and grammatical errors may remain.    8/15/2020   [facility] EMERGENCY DEPARTMENT     Marian Barton PA-C  08/16/20 0041

## 2020-08-16 NOTE — DISCHARGE INSTRUCTIONS
I think your bleeding was related to internal hemorrhoids.  It is reasonable to monitor things going forward.  Be sure your stool is nice and soft, use stool softeners if needed as this can help reduce irritation to the hemorrhoids.  Drink lots of fluids.  Call your clinic provider if symptoms are not improved in the next few days.  If you develop any worsening concerns please return to the emergency department.    Thank you for choosing [facility]'s Emergency Department. It was a pleasure taking care of you today. If you have any questions, please call [phone].    Marian Barton PA-C

## 2020-08-17 NOTE — PROGRESS NOTES
Infectious Disease Clinic Staff Note: Ms. Kilpatrick had a Telephone Virtual Visit today and I discussed the case with Dr. Saha, ID Fellow -- I agree with his consultative history, assessment and plan in this outpatient ID Virtual Consult note. This note reflects my observations and opinions and the plan outlined fully reflects my approach. I have reviewed the available history, radiology, laboratory results, and reports with the Fellow.

## 2020-08-18 ENCOUNTER — NURSE TRIAGE (OUTPATIENT)
Dept: NURSING | Facility: CLINIC | Age: 30
End: 2020-08-18

## 2020-08-18 ENCOUNTER — TELEPHONE (OUTPATIENT)
Facility: CLINIC | Age: 30
End: 2020-08-18

## 2020-08-18 ENCOUNTER — COMMUNICATION - HEALTHEAST (OUTPATIENT)
Dept: SCHEDULING | Facility: CLINIC | Age: 30
End: 2020-08-18

## 2020-08-18 NOTE — TELEPHONE ENCOUNTER
Patient called to reschedule surgery, back from Feb.  Do you need to see patient again first? If not- please place case request.  Pt informed we will call her back either way

## 2020-08-18 NOTE — TELEPHONE ENCOUNTER
"Patient calling with assistance from American Sign Language phone .    States significant swelling and pain in bilateral lower extremities starting right above knee down to toes. Describes swelling in feet as feeling they are \"almost about to pop\". Unable to walk due to pain. Ankles and toes \"turning purple\" in color, positive for numbness and tingling in both lower extremities starting at knee. States feet feel \"cool\" to touch.     Current temperature 100.3 (Oral). Also describes some mild lower back pain. Recently took Tylenol.    States swelling has been occurring on and off for 6 months, but today is the worst the swelling and pain has ever been and is now unable to walk due to current condition.     Took blood pressure this morning 120/114, but batteries now dead and unable to retake blood pressure at this time.     Advised 911 since patient has no one that can take her to ED. Patient stated \"I am not calling 911\" and hung up phone mid-conversation before writer was able to provide further instructions and recommendations.    Tabitha Jose RN  [facility]    COVID 19 Nurse Triage Plan/Patient Instructions    Please be aware that novel coronavirus (COVID-19) may be circulating in the community. If you develop symptoms such as fever, cough, or SOB or if you have concerns about the presence of another infection including coronavirus (COVID-19), please contact your health care provider or visit [email or web address].     Disposition/Instructions    Call to EMS/911 recommended. Follow protocol based instructions.     Bring Your Own Device:  Please also bring your smart device(s) (smart phones, tablets, laptops) and their charging cables for your personal use and to communicate with your care team during your visit.    Thank you for taking steps to prevent the spread of this virus.  o Limit your contact with others.  o Wear a simple mask to cover your cough.  o Wash your hands well and " often.    Resources    [facility]: About COVID-19: [email or web address]    CDC: What to Do If You're Sick: www.cdc.gov/coronavirus/2019-ncov/about/steps-when-sick.html    CDC: Ending Home Isolation: www.cdc.gov/coronavirus/2019-ncov/hcp/disposition-in-home-patients.html     CDC: Caring for Someone: www.cdc.gov/coronavirus/2019-ncov/if-you-are-sick/care-for-someone.html     [facility]: Interim Guidance for Hospital Discharge to Home: www.health.[facility]/diseases/coronavirus/hcp/hospdischarge.pdf    [facility] clinical trials (COVID-19 research studies): [email or web address]-[email or web address]-trials     Below are the COVID-19 hotlines at the [place] [facility] ([facility]). Interpreters are available.   o For health questions: Call 266-014-0542 or 1-575.626.7132 (7 a.m. to 7 p.m.)  o For questions about schools and childcare: Call 743-672-0196 or 1-591.554.7114 (7 a.m. to 7 p.m.)                                     Reason for Disposition    [1] Can't walk or can barely walk AND [2] new onset    Additional Information    Negative: Severe difficulty breathing (e.g., struggling for each breath, speaks in single words)    Negative: Looks like a broken bone or dislocated joint (e.g., crooked or deformed)    Negative: Sounds like a life-threatening emergency to the triager    Negative: Chest pain    Negative: Difficulty breathing at rest    Negative: Entire foot is cool or blue in comparison to other side    Negative: Followed a leg injury    Negative: [1] Small area of swelling AND [2] followed an insect bite to the area    Negative: Swelling of one ankle joint    Negative: Swelling of knee is main symptom    Negative: Pregnant    Negative: Postpartum (from 0 to 6 weeks after delivery)    Protocols used: LEG SWELLING AND EDEMA-A-AH

## 2020-08-19 ENCOUNTER — VIRTUAL VISIT (OUTPATIENT)
Dept: FAMILY MEDICINE | Facility: OTHER | Age: 30
End: 2020-08-19
Payer: MEDICARE

## 2020-08-19 ENCOUNTER — MYC MEDICAL ADVICE (OUTPATIENT)
Dept: FAMILY MEDICINE | Facility: OTHER | Age: 30
End: 2020-08-19

## 2020-08-19 DIAGNOSIS — I10 HYPERTENSION GOAL BP (BLOOD PRESSURE) < 130/80: ICD-10-CM

## 2020-08-19 DIAGNOSIS — F32.0 MILD MAJOR DEPRESSION (H): ICD-10-CM

## 2020-08-19 DIAGNOSIS — M54.42 CHRONIC BILATERAL LOW BACK PAIN WITH LEFT-SIDED SCIATICA: ICD-10-CM

## 2020-08-19 DIAGNOSIS — F43.23 ADJUSTMENT DISORDER WITH MIXED ANXIETY AND DEPRESSED MOOD: ICD-10-CM

## 2020-08-19 DIAGNOSIS — G89.29 CHRONIC BILATERAL LOW BACK PAIN WITH LEFT-SIDED SCIATICA: ICD-10-CM

## 2020-08-19 DIAGNOSIS — R60.9 EDEMA, UNSPECIFIED TYPE: Primary | ICD-10-CM

## 2020-08-19 PROCEDURE — 99443 ZZC PHYSICIAN TELEPHONE EVALUATION 21-30 MIN: CPT | Performed by: PHYSICIAN ASSISTANT

## 2020-08-19 RX ORDER — HYDROCHLOROTHIAZIDE 12.5 MG/1
12.5 TABLET ORAL DAILY
Qty: 30 TABLET | Refills: 1 | OUTPATIENT
Start: 2020-08-19

## 2020-08-19 NOTE — TELEPHONE ENCOUNTER
I spoke with the pt who states her legs feel really swollen. Her feet really hurt when she walks. Started 2 days ago. She has had swelling before but with elevating the swelling would improve but not this time. Bug bites around her ankles are oozing, they are itching. She has been using Cortizone. Temp 100.3 yesterday. She does not remember hitting her leg or how she got the bruising. She states her blood pressure machine is not working right now. [facility], She had a nice walker and they are not able to find it. She states she needs a new walker. No SOB and No chest pain. She will discuss all of this with CDL at her appt today. She states she just bought a futon yesterday so she is able to elevate her legs and watch a movie.     Routing to CDL as LJ, see RIISnett pictures of swollen legs    Claudine Jimenez, MSN, RN

## 2020-08-19 NOTE — TELEPHONE ENCOUNTER
Left message for return call to relay providers info below.....              Plan to hold off on surgery planning at this time until medical cleared given recent ER visits. If patient has further questions, she can schedule a visit to discuss further.     Thanks!   Monique Berumen, DO         She seemed to be a bit more stable our last few visits but then the ED visits started again.   She just did her spinal cord stimulator trial. I would recommend at least waiting until that gets all figured out.   Zach Rosario PA-C   [facility]

## 2020-08-20 ENCOUNTER — HOSPITAL ENCOUNTER (EMERGENCY)
Facility: CLINIC | Age: 30
Discharge: HOME | End: 2020-08-20
Attending: PHYSICIAN ASSISTANT | Admitting: PHYSICIAN ASSISTANT
Payer: MEDICARE

## 2020-08-20 VITALS
TEMPERATURE: 97.9 F | SYSTOLIC BLOOD PRESSURE: 142 MMHG | RESPIRATION RATE: 16 BRPM | HEART RATE: 116 BPM | OXYGEN SATURATION: 98 % | DIASTOLIC BLOOD PRESSURE: 105 MMHG

## 2020-08-20 DIAGNOSIS — M54.50 CHRONIC LOW BACK PAIN: ICD-10-CM

## 2020-08-20 DIAGNOSIS — B96.89 BACTERIAL VAGINITIS: ICD-10-CM

## 2020-08-20 DIAGNOSIS — N76.0 BACTERIAL VAGINITIS: ICD-10-CM

## 2020-08-20 DIAGNOSIS — G89.29 CHRONIC LOW BACK PAIN: ICD-10-CM

## 2020-08-20 DIAGNOSIS — B37.31 CANDIDIASIS OF VAGINA: ICD-10-CM

## 2020-08-20 LAB
ALBUMIN UR-MCNC: NEGATIVE MG/DL
APPEARANCE UR: CLEAR
BILIRUB UR QL STRIP: NEGATIVE
COLOR UR AUTO: YELLOW
GLUCOSE UR STRIP-MCNC: NEGATIVE MG/DL
HCG UR QL: NEGATIVE
HGB UR QL STRIP: NEGATIVE
KETONES UR STRIP-MCNC: NEGATIVE MG/DL
LEUKOCYTE ESTERASE UR QL STRIP: NEGATIVE
NITRATE UR QL: NEGATIVE
PH UR STRIP: 7 PH (ref 5–7)
SOURCE: NORMAL
SP GR UR STRIP: 1.01 (ref 1–1.03)
SPECIMEN SOURCE: ABNORMAL
UROBILINOGEN UR STRIP-MCNC: 0 MG/DL (ref 0–2)
WET PREP SPEC: ABNORMAL

## 2020-08-20 PROCEDURE — 81025 URINE PREGNANCY TEST: CPT | Performed by: EMERGENCY MEDICINE

## 2020-08-20 PROCEDURE — 99284 EMERGENCY DEPT VISIT MOD MDM: CPT | Mod: Z6 | Performed by: PHYSICIAN ASSISTANT

## 2020-08-20 PROCEDURE — 87210 SMEAR WET MOUNT SALINE/INK: CPT | Performed by: PHYSICIAN ASSISTANT

## 2020-08-20 PROCEDURE — 99283 EMERGENCY DEPT VISIT LOW MDM: CPT | Performed by: PHYSICIAN ASSISTANT

## 2020-08-20 PROCEDURE — 81003 URINALYSIS AUTO W/O SCOPE: CPT | Performed by: EMERGENCY MEDICINE

## 2020-08-20 RX ORDER — CARISOPRODOL 350 MG/1
350 TABLET ORAL 3 TIMES DAILY PRN
Qty: 10 TABLET | Refills: 0 | Status: SHIPPED | OUTPATIENT
Start: 2020-08-20 | End: 2021-04-27

## 2020-08-20 RX ORDER — METRONIDAZOLE 500 MG/1
500 TABLET ORAL 2 TIMES DAILY
Qty: 14 TABLET | Refills: 1 | Status: SHIPPED | OUTPATIENT
Start: 2020-08-20 | End: 2020-08-27

## 2020-08-20 NOTE — ED AVS SNAPSHOT
[facility] Emergency Department  [address] [place] [address]  Phone:  [phone]  Fax:  [phone]                                    Katy Islas   MRN: [id number]    Department:  [facility] Emergency Department   Date of Visit:  8/20/2020           After Visit Summary Signature Page    I have received my discharge instructions, and my questions have been answered. I have discussed any challenges I see with this plan with the nurse or doctor.    ..........................................................................................................................................  Patient/Patient Representative Signature      ..........................................................................................................................................  Patient Representative Print Name and Relationship to Patient    ..................................................               ................................................  Date                                   Time    ..........................................................................................................................................  Reviewed by Signature/Title    ...................................................              ..............................................  Date                                               Time          22EPIC Rev 08/18

## 2020-08-20 NOTE — ED TRIAGE NOTES
"Pt arrives with \"back pain, Dark urine, and burns to pee, I had a severe UTI recently and Im having surgery on 9/8 so if I have a UTI and need antibiotics, so I can be healthy before my surgery.\" Patient's airway, breathing, circulation, and disability/mental status (ABCDs) intact/WDL during triage.  " 190

## 2020-08-21 ENCOUNTER — PATIENT OUTREACH (OUTPATIENT)
Dept: CARE COORDINATION | Facility: CLINIC | Age: 30
End: 2020-08-21

## 2020-08-21 NOTE — DISCHARGE INSTRUCTIONS
It looks like you have bacterial vaginosis as well as vaginal yeast infection.  Take the Diflucan you have at home and the Flagyl prescribed to treat both of these issues.    For your chronic back pain, you can try the Soma that was prescribed to see if that helps but as discussed if you have side effects such as sleepwalking please discontinue medication.    If symptoms are not improved within a few days, follow-up in the clinic.  If you develop any worsening concerns please return to the emergency department.    Thank you for choosing [facility]'s Emergency Department. It was a pleasure taking care of you today. If you have any questions, please call [phone].    Marian Barton PA-C

## 2020-08-21 NOTE — ED PROVIDER NOTES
History     Chief Complaint   Patient presents with     Rule out Urinary Tract Infection     HPI  Katy Islas is a 30 year old female with a complex medical history who presented to the ED pain of back pain and urinary symptoms.  She has chronic back pain and actually just had neurostimulator removed a few days ago. Patient reports she is scheduled to have a back surgery on September 8 with [facility].  She states in the last couple days she started having dark urine and some burning with urination.  She was just treated in the last couple weeks for a UTI with ciprofloxacin.  She is also noticed some vaginal discharge but no bleeding.  She has not had any fevers, or nausea/vomiting.  Her back pain she relates to her chronic issues.  She has Flexeril and hydrocodone at home but it does not always work fully.  No new numbness or weakness reported.  She ambulates without issues.     used during interview due to patient being deaf.    Allergies:  Allergies   Allergen Reactions     Iohexol Hives     Other reaction(s): Hives  IV contrast; patient states she tolerates contrast if she gets benadryl before  IV contrast; patient states she tolerates contrast if she gets benadryl before     Ativan [Lorazepam] Hives     At the IV site     Baclofen      hives     Bees Hives, Swelling and Difficulty breathing     Caffeine      Contrast Dye      Hives,   Updated 5/10/2016 CT Contrast.     Dilaudid [Hydromorphone] Itching     Iodine Hives     Methocarbamol Swelling     Metoclopramide      Other reaction(s): Tremors  LW Reaction: shaking/sweating     Midazolam      Other reaction(s): Agitation     Monosodium Glutamate      Morphine Other (See Comments)     Difficulty with urination     Nsaids Other (See Comments)     GI BLEED x2     Other (Do Not Use) Other (See Comments)     Xanaflex- pt becomes disoriented and loses bladder control     Reglan [Metoclopramide Hcl] Other (See Comments)     shaking     Soma  [Carisoprodol] Visual Disturbance     Sleep walking     Tizanidine      Topamax Other (See Comments)     Topamax [Topiramate] Nausea     Tingling  GI/Vomit     Tramadol      Severe Headache, Seizure Risk     Tylenol W/Codeine [Acetaminophen-Codeine] Nausea and Itching     Tylenol 3     Versed Other (See Comments)     Zolmitriptan      Makes face feel like its twitching     Droperidol Anxiety     Flu Virus Vaccine Rash      Arm swelling       Problem List:    Patient Active Problem List    Diagnosis Date Noted     Patellofemoral pain syndrome of both knees 06/11/2020     Priority: Medium     Primary osteoarthritis of both knees 06/11/2020     Priority: Medium     Abscess of anal and rectal regions 04/27/2020     Priority: Medium     Added automatically from request for surgery [id number]       Rectal pain 04/19/2020     Priority: Medium     Abdominal pain 04/18/2020     Priority: Medium     Morbid obesity (H) 03/10/2020     Priority: Medium     Abnormal uterine bleeding 02/12/2020     Priority: Medium     Added automatically from request for surgery [id number]       Anal fissure 02/04/2020     Priority: Medium     Low hematocrit 02/04/2020     Priority: Medium     Elevated d-dimer 02/04/2020     Priority: Medium     Hypertension goal BP (blood pressure) < 130/80 08/05/2019     Priority: Medium     Bulge of cervical disc without myelopathy 04/24/2019     Priority: Medium     Cervicalgia 04/17/2019     Priority: Medium     Class 1 obesity due to excess calories without serious comorbidity with body mass index (BMI) of 32.0 to 32.9 in adult 04/03/2019     Priority: Medium     Hypophosphatemia 11/01/2018     Priority: Medium     Patellar maltracking, right 09/05/2018     Priority: Medium     Right anterior knee pain 09/05/2018     Priority: Medium     Melanocytic nevus, unspecified location 07/23/2018     Priority: Medium     Dysplastic skin lesion 07/23/2018     Priority: Medium     Iliotibial band syndrome affecting lower  leg, right 12/21/2017     Priority: Medium     Chondromalacia of right patellofemoral joint 12/21/2017     Priority: Medium     Upper GI bleed - suspected 07/01/2017     Priority: Medium     Tobacco use disorder 07/01/2017     Priority: Medium     History of pseudoseizure 07/01/2017     Priority: Medium     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) 03/10/2017     Priority: Medium     Lumbar disc disease with radiculopathy 02/23/2017     Priority: Medium     S/P lumbar fusion 02/23/2017     Priority: Medium     Dr. YOVANI Millan, 2/23/17       Vitamin D deficiency 01/06/2017     Priority: Medium     Atypical mole 01/06/2017     Priority: Medium     Mild persistent asthma without complication 12/02/2016     Priority: Medium     Chronic bilateral low back pain with left-sided sciatica 12/02/2016     Priority: Medium     Bee sting allergy 09/16/2016     Priority: Medium     Gastroesophageal reflux disease without esophagitis 08/26/2016     Priority: Medium     Herpes simplex virus infection 11/12/2015     Priority: Medium     Adjustment disorder with mixed anxiety and depressed mood 10/14/2015     Priority: Medium     Marijuana abuse 02/22/2015     Priority: Medium     Bipolar disorder (H) 01/31/2013     Priority: Medium     Problem list name updated by automated process. Provider to review       Chronic pain 02/09/2012     Priority: Medium     Patient is followed by Aura Rosario PA-C for ongoing prescription of pain medication.  All refills should only be approved by this provider, or covering partner.    Medication(s): Norco    Maximum quantity per month: 70  Clinic visit frequency required: Q 2 months     Controlled substance agreement:   Discussed and signed 4/25/17    Encounter-Level CSA - 01/12/2015:                 Controlled Substance Agreement - Scan on 1/26/2015  9:14 AM : Controlled Medication Agreement-01/12/15 (below)            Pain Clinic evaluation in the past: Yes        Date/Location:   [facility], fall 2016    DIRE Total Score(s):    4/25/2017   Total Score 17       Last [facility] website verification:  done on 4/25/17 by LOVE Maki   [email or web address]           Anxiety 09/22/2011     Priority: Medium     Mild major depression (H) 08/29/2011     Priority: Medium     Mild intermittent asthma 10/12/2009     Priority: Medium     Overview:   Formatting of this note might be different from the original.  Action plan: See letter 4/10/2013   ATAQ:   Asthma Data 4/10/2013   Date completed 4/10/2013   Missed daily activities no = 0   Wake at night no = 0   Believe asthma in control yes = 0   ARIN use yes   Maximum ARIN use in 1 day 1-4 = 0   ATAQ score 0 = well controlled   Asthma ED visits in past 12 mos 0   Asthma hospitalizations in past 12 mos 0     Current Outpatient Prescriptions   Medication Sig     albuterol (PROVENTIL) 0.083 % neb solution Inhale 3 mL via a nebulizer every 4 hours if needed for Shortness Of Breath.        Crohn's colitis (H) 08/04/2009     Priority: Medium     Dysmenorrhea 05/11/2007     Priority: Medium     Benign neoplasm of skin of lower limb, including hip 03/16/2004     Priority: Medium     Migraine      Priority: Medium     Dr. Farrar - Neurology.  Now on Inderal.  Seems to be working.  Follow-up 9/08  Problem list name updated by automated process. Provider to review       Hearing loss      Priority: Medium     Congenital  Problem list name updated by automated process. Provider to review       Allergic rhinitis      Priority: Medium     Problem list name updated by automated process. Provider to review          Past Medical History:    Past Medical History:   Diagnosis Date     Abdominal pain 10/31- 11/4/2005     Allergic rhinitis, cause unspecified      Arthritis      C. 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, without mention of intractable epilepsy      Migraine 07/21/12     Migraine, unspecified, without mention of intractable migraine without mention of status migrainosus      Mild intermittent asthma      Mycoplasma infection in conditions classified elsewhere and of unspecified site      Other chronic pain      Renal disease      Unspecified hearing loss        Past Surgical History:    Past Surgical History:   Procedure Laterality Date     AS REMOVAL OF COCCYX  10/18/2017     C FUSION OF SACROILIAC JOINT  10/26/2018     COLONOSCOPY  7/1/2009    [facility] [place]     COLONOSCOPY N/A 7/17/2019    Procedure: Colonoscopy, With Polypectomy And Biopsy;  Surgeon: Edwin Conde MD;  Location: [facility] OR     COLONOSCOPY WITH CO2 INSUFFLATION N/A 7/17/2019    Procedure: COLONOSCOPY, WITH CO2 INSUFFLATION;  Surgeon: Edwin Conde MD;  Location: [facility] OR     COMBINED ESOPHAGOSCOPY, GASTROSCOPY, DUODENOSCOPY (EGD) WITH CO2 INSUFFLATION N/A 4/12/2019    Procedure: COMBINED ESOPHAGOSCOPY, GASTROSCOPY, DUODENOSCOPY (EGD) WITH CO2 INSUFFLATION;  Surgeon: Edwin Conde MD;  Location: [facility] OR     ESOPHAGOSCOPY, GASTROSCOPY, DUODENOSCOPY (EGD), COMBINED N/A 4/12/2019    Procedure: Combined Esophagoscopy, Gastroscopy, Duodenoscopy (Egd), Biopsy Single Or Multiple;  Surgeon: Edwin Conde MD;  Location: [facility] OR     FISTULOTOMY RECTUM N/A 4/30/2020    Procedure: EXAM UNDER ANESTHESIA, ANUS, parital fistulotomy;  Surgeon: Valentin Sanchez MD;  Location: [facility]     FUSION SPINE POSTERIOR MINIMALLY INVASIVE ONE LEVEL N/A 2/23/2017    Procedure: FUSION SPINE POSTERIOR MINIMALLY INVASIVE ONE LEVEL;  L4-5 Oblique Lateral Lumbar Interbody Fusion   Epidural steroid injection.   Transpedicular Bone  marrow aspiration;  Surgeon: Jeniffer Eugene MD;  Location: [facility] OR     HC COLONOSCOPY THRU STOMA WITH BIOPSY  10/29/2003    Impression is that of normal appearing colonoscopy, without evidence of rectal bleeding.     HC COLONOSCOPY THRU STOMA, DIAGNOSTIC  10/00    normal     HC COLONOSCOPY THRU STOMA, DIAGNOSTIC  Oct 2009    Dr López- normal     HC EEG AWAKE AND SLEEP      abnormal     HC MRI BRAIN W/O CONTRAST  12/00    normal     HC REMOVAL GALLBLADDER  8/5/2009    [facility]     HC UGI ENDOSCOPY DIAG W BIOPSY  11/11/09    Normal esophagus     HC UGI ENDOSCOPY, SIMPLE EXAM  7/00, 10/00    mild chronic esophagitis and duodenitis, neg H pylori     HC UGI ENDOSCOPY, SIMPLE EXAM  01/20/2005    Esophagogastroduodenoscopy, colonoscopy with biopsies.  [facility] UGI ENDOSCOPY, SIMPLE EXAM  7/1/2009    [facility]      UGI ENDOSCOPY, SIMPLE EXAM  11/11/2009    attempted upper GI, pt. could not tolerate procedure:[facility]     ORTHOPEDIC SURGERY  October 19,2011    diskectomy L4-L5       Family History:    Family History   Problem Relation Age of Onset     Gastrointestinal Disease Brother         severe Crohn's     Neurologic Disorder Brother         Seizures post head injury     Depression Brother      Substance Abuse Brother      Genitourinary Problems Father         kidney stones     Diabetes Father      Heart Disease Father         Open heart surgery     Breast Cancer Maternal Grandmother      Parkinsonism Maternal Grandmother      Cerebrovascular Disease Paternal Grandmother      Cancer Maternal Grandfather         Lung     Cardiovascular Paternal Grandfather         Heart Attack     Substance Abuse Mother         in recovery x1 year      Lung Cancer Paternal Uncle      Cancer Paternal Uncle      Lung Cancer Maternal Uncle        Social History:  Marital Status:  Single [1]  Social History     Tobacco Use     Smoking status: Current Every  Day Smoker     Packs/day: 0.25     Years: 5.00     Pack years: 1.25     Types: Cigarettes     Smokeless tobacco: Never Used   Substance Use Topics     Alcohol use: Not Currently     Comment: Not since last July 2018     Drug use: Yes     Types: Marijuana     Comment: Medical Marijuana currently-- More CBD        Medications:    carisoprodol (SOMA) 350 MG tablet  metroNIDAZOLE (FLAGYL) 500 MG tablet  acetaminophen (TYLENOL) 500 MG tablet  albuterol (PROAIR HFA/PROVENTIL HFA/VENTOLIN HFA) 108 (90 Base) MCG/ACT inhaler  albuterol (PROVENTIL) (2.5 MG/3ML) 0.083% neb solution  alum & mag hydroxide-simethicone (MAALOX ADVANCED MAX ST) 400-400-40 MG/5ML SUSP suspension  ARIPiprazole (ABILIFY) 30 MG tablet  aspirin (ASA) 81 MG tablet  azelastine (OPTIVAR) 0.05 % ophthalmic solution  buprenorphine (BUTRANS) 10 MCG/HR WK patch  Buprenorphine HCl (BELBUCA) 300 MCG FILM buccal film  buPROPion (WELLBUTRIN XL) 150 MG 24 hr tablet  busPIRone (BUSPAR) 15 MG tablet  cyclobenzaprine (FLEXERIL) 10 MG tablet  diclofenac (VOLTAREN) 1 % topical gel  diphenhydrAMINE (BENADRYL) 50 MG capsule  DULoxetine (CYMBALTA) 60 MG EC capsule  EPINEPHrine (ANY BX GENERIC EQUIV) 0.3 MG/0.3ML injection 2-pack  fexofenadine (ALLEGRA) 180 MG tablet  fluticasone (FLONASE) 50 MCG/ACT nasal spray  gabapentin (NEURONTIN) 300 MG capsule  hydrochlorothiazide (HYDRODIURIL) 12.5 MG tablet  HYDROcodone-acetaminophen (NORCO) 5-325 MG tablet  lidocaine (XYLOCAINE) 2 % solution  losartan (COZAAR) 100 MG tablet  medical cannabis (Patient's own supply)  medroxyPROGESTERone (DEPO-PROVERA) 150 MG/ML IM injection  methocarbamol (ROBAXIN) 750 MG tablet  NONFORMULARY  nystatin (MYCOSTATIN) 628816 UNIT/ML suspension  ondansetron (ZOFRAN-ODT) 8 MG ODT tab  oxyCODONE-acetaminophen (PERCOCET) 5-325 MG tablet  pantoprazole (PROTONIX) 40 MG EC tablet  polyethylene glycol (MIRALAX) 17 g packet  prazosin (MINIPRESS) 1 MG capsule  prochlorperazine (COMPAZINE) 10 MG  tablet  rizatriptan (MAXALT-MLT) 5 MG ODT  sucralfate (CARAFATE) 1 GM tablet  valACYclovir (VALTREX) 1000 mg tablet          Review of Systems   All other systems reviewed and are negative.      Physical Exam   BP: (!) 142/105  Pulse: 116  Temp: 97.9  F (36.6  C)  Resp: 16  SpO2: 98 %      Physical Exam  Vitals signs and nursing note reviewed.   Constitutional:       General: She is not in acute distress.     Appearance: Normal appearance. She is obese. She is not ill-appearing, toxic-appearing or diaphoretic.   HENT:      Head: Normocephalic and atraumatic.      Nose: Nose normal.      Mouth/Throat:      Mouth: Mucous membranes are moist.   Eyes:      Extraocular Movements: Extraocular movements intact.      Conjunctiva/sclera: Conjunctivae normal.   Neck:      Musculoskeletal: Neck supple.   Cardiovascular:      Rate and Rhythm: Regular rhythm. Tachycardia present.      Heart sounds: Normal heart sounds.   Pulmonary:      Effort: Pulmonary effort is normal. No respiratory distress.      Breath sounds: Normal breath sounds.   Abdominal:      Tenderness: There is no abdominal tenderness. There is no right CVA tenderness or left CVA tenderness.   Genitourinary:     Comments: Refused by patient  Musculoskeletal:      Comments: Mild tenderness to the musculature of the lumbosacral region bilaterally.  No weakness in lower extremities.   Skin:     General: Skin is warm and dry.   Neurological:      Mental Status: She is alert and oriented to person, place, and time. Mental status is at baseline.      Comments: Deaf,  used during interview   Psychiatric:         Mood and Affect: Mood normal.         Behavior: Behavior normal.         ED Course        Procedures      Results for orders placed or performed during the hospital encounter of 08/20/20 (from the past 24 hour(s))   UA reflex to Microscopic and Culture    Specimen: Midstream Urine   Result Value Ref Range    Color Urine Yellow     Appearance Urine  Clear     Glucose Urine Negative NEG^Negative mg/dL    Bilirubin Urine Negative NEG^Negative    Ketones Urine Negative NEG^Negative mg/dL    Specific Gravity Urine 1.014 1.003 - 1.035    Blood Urine Negative NEG^Negative    pH Urine 7.0 5.0 - 7.0 pH    Protein Albumin Urine Negative NEG^Negative mg/dL    Urobilinogen mg/dL 0.0 0.0 - 2.0 mg/dL    Nitrite Urine Negative NEG^Negative    Leukocyte Esterase Urine Negative NEG^Negative    Source Midstream Urine    HCG qualitative urine (UPT)   Result Value Ref Range    HCG Qual Urine Negative NEG^Negative   Wet prep    Specimen: Vagina   Result Value Ref Range    Specimen Description Vagina     Wet Prep No Trichomonas seen     Wet Prep Few  Clue cells seen   (A)     Wet Prep Rare  Yeast seen   (A)     Wet Prep Few  PMNs seen        *Note: Due to a large number of results and/or encounters for the requested time period, some results have not been displayed. A complete set of results can be found in Results Review.       Medications - No data to display    Assessments & Plan (with Medical Decision Making)  Katy Islas is a 30 year old female who presented to the ED complaining of burning with urination and low back pain.  She has chronic low back pain for which she is scheduled surgery on 9/8 but it has been more bothersome lately. The burning with urination has started in the last couple days.  Recently treated for UTI with Cipro.  No associated flank pain, nausea/vomiting, or fevers.  No new warning neurological symptoms or signs.  She was mildly tachycardic on arrival with elevated blood pressure, but afebrile.  Exam was benign today other than some muscular tenderness to the lumbosacral region.  No flank tenderness, no abdominal tenderness.  With her complaint of vaginal discharge I recommended pelvic exam but she refused.  Urinalysis was collected which was negative for any signs of infection.  UPT was also negative.  I discussed these results with the patient and  she was agreeable to having a wet prep collected but still declined pelvic exam.  Wet prep demonstrated clue cells consistent with bacterial vaginosis and yeast consistent with vaginal candidiasis, both of which can cause her abnormal discharge and burning with urination.  She stated that she has Diflucan at home she will take to treat the vaginal candidiasis and she will be prescribed Flagyl for treatment of the bacterial vaginosis.  In regard to her back pain, we discussed management options and she requested Soma because that apparently has helped in the past.  It was listed on her allergy list due to side effect of sleepwalking but she does not recall this and would like to try it again if possible.  I gave her 10 tablets and discussed side effects and advised discontinuing if this develops.  If her symptoms are improved within a few days she should follow-up in the clinic for reassessment.  She was given instructions on when to return to the ED.  All questions were answered and the patient was discharged home in suitable condition.     I have reviewed the nursing notes.    I have reviewed the findings, diagnosis, plan and need for follow up with the patient.    New Prescriptions    CARISOPRODOL (SOMA) 350 MG TABLET    Take 1 tablet (350 mg) by mouth 3 times daily as needed for muscle spasms    METRONIDAZOLE (FLAGYL) 500 MG TABLET    Take 1 tablet (500 mg) by mouth 2 times daily for 7 days       Final diagnoses:   Chronic low back pain   Bacterial vaginitis   Candidiasis of vagina     Note: Chart documentation done in part with Dragon Voice Recognition software. Although reviewed after completion, some word and grammatical errors may remain.    8/20/2020   [facility] EMERGENCY DEPARTMENT     Marian Barton PA-C  08/20/20 1949

## 2020-08-21 NOTE — PROGRESS NOTES
Clinic Care Coordination Contact  [facility]/Voicemail       Clinical Data: CHW Outreach  Outreach attempted x 1  Left message on patient's voicemail with call back information and requested return call with [facility]SI.    Plan: CHW will try to reach patient again in 1-2 business days.    Elena STEVEN Community Health Worker  Clinic Care Coordination  [facility] [place] [facility] [place]  Phone: [phone]        Referral made off of discharge report.

## 2020-08-21 NOTE — LETTER
[facility] CARE COORDINATION  [address]  [place] [address]    August 24, 2020    Katy Islas  [address]  [place] [address]      Dear Katy,    I am a clinic community health worker who works with Aura Rosario PA-C at [facility]. I have been trying to reach you recently to introduce Clinic Care Coordination and to see if there was anything I could assist you with.  Below is a description of clinic care coordination and how I can further assist you.      The clinic care coordination team is made up of a registered nurse,  and community health worker who understand the health care system. The goal of clinic care coordination is to help you manage your health and improve access to the health care system in the most efficient manner. The team can assist you in meeting your health care goals by providing education, coordinating services, strengthening the communication among your providers and supporting you with any resource needs.    Please feel free to contact me at [phone] with any questions or concerns. We are focused on providing you with the highest-quality healthcare experience possible and that all starts with you.     Sincerely,     Elena STEVEN, Community Health Worker  Clinic Care Coordination  [facility] [place] [facility] [place]  Phone: [phone]

## 2020-08-24 ENCOUNTER — TELEPHONE (OUTPATIENT)
Dept: FAMILY MEDICINE | Facility: OTHER | Age: 30
End: 2020-08-24

## 2020-08-24 NOTE — PROGRESS NOTES
[address]  [facility] [address]  Phone: [phone]  Primary Provider: Aura Rosario      PREOPERATIVE EVALUATION:  Today's date: 8/27/2020    Katy Islas is a 30 year old female who presents for a preoperative evaluation.    Surgical Information:  Surgery Details 10/8/2018   Surgery/Procedure: Neuro Spine stimulator    Surgery Location: [facility]   Surgeon: Dr Benitez   Surgery Date: 09/18/20   Time of Surgery: 1300     Fax number for surgical facility: Note does not need to be faxed, will be available electronically in Epic.  Type of Anesthesia Anticipated: General    NEEDS EKG - CDL    Subjective     HPI related to upcoming procedure: Patient with chronic back pain, s/p lumbar fusion, SI Joint fusion, and coccyx removal after continued back pain failing conservative measures. She has undergone many injections and physical therapy. She had a trial for spinal cord stimulator with good results. She is now undergoing spinal cord stimulator permanent placement.     Preop Questions 10/8/2018   Have you ever had a heart attack or stroke? No   Do you have chest pain with activity? No   Do you have a history of  heart failure? No   Do you currently have a cold, bronchitis or symptoms of other infection? No   Do you have a cough, shortness of breath, or wheezing? No   Do you or anyone in your family have previous history of blood clots? No   Do you or does anyone in your family have a serious bleeding problem such as prolonged bleeding following surgeries or cuts? No   Have you ever had problems with anemia or been told to take iron pills? No   Have you had any abnormal blood loss such as black, tarry or bloody stools, or abnormal vaginal bleeding? No   Have you ever had a blood transfusion? No   Have you or any of your relatives ever had problems with anesthesia? No   Do you have sleep apnea, excessive snoring or daytime drowsiness? No   Do you have any  artifical heart valves or other implanted medical devices like a pacemaker, defibrillator, or continuous glucose monitor? No   Do you have artificial joints? No   Is there any chance that you may be pregnant? No     Patient does not have a Health Care Directive or Living Will: Discussed advance care planning with patient; however, patient declined at this time.    RX [place] reviewed:  reviewed- no concerns - fills from ED doctors and her pain clinic     See problem list for active medical problems.  Problems all longstanding and stable, except as noted/documented.  See ROS for pertinent symptoms related to these conditions.    ASTHMA - Patient has a longstanding history of asthma. Patient has been doing well overall noting NO SYMPTOMS and continues on medication regimen consisting of albuterol without adverse reactions or side effects.     HYPERTENSION - Patient has longstanding history of hypertension, currently denies any symptoms referable to elevated blood pressure. Specifically denies chest pain, palpitations, dyspnea, orthopnea, PND or peripheral edema. Blood pressure readings have been in normal range. Current medication regimen is as listed below. Patient denies any side effects of medication.       Review of Systems  Constitutional, neuro, ENT, endocrine, pulmonary, cardiac, gastrointestinal, genitourinary, musculoskeletal, integument and psychiatric systems are negative, except as otherwise noted.    Patient Active Problem List    Diagnosis Date Noted     Patellofemoral pain syndrome of both knees 06/11/2020     Priority: Medium     Primary osteoarthritis of both knees 06/11/2020     Priority: Medium     Abscess of anal and rectal regions 04/27/2020     Priority: Medium     Added automatically from request for surgery [id number]       Rectal pain 04/19/2020     Priority: Medium     Abdominal pain 04/18/2020     Priority: Medium     Morbid obesity (H) 03/10/2020     Priority: Medium      Abnormal uterine bleeding 02/12/2020     Priority: Medium     Added automatically from request for surgery [id number]       Anal fissure 02/04/2020     Priority: Medium     Low hematocrit 02/04/2020     Priority: Medium     Elevated d-dimer 02/04/2020     Priority: Medium     Hypertension goal BP (blood pressure) < 130/80 08/05/2019     Priority: Medium     Bulge of cervical disc without myelopathy 04/24/2019     Priority: Medium     Cervicalgia 04/17/2019     Priority: Medium     Class 1 obesity due to excess calories without serious comorbidity with body mass index (BMI) of 32.0 to 32.9 in adult 04/03/2019     Priority: Medium     Hypophosphatemia 11/01/2018     Priority: Medium     Patellar maltracking, right 09/05/2018     Priority: Medium     Right anterior knee pain 09/05/2018     Priority: Medium     Melanocytic nevus, unspecified location 07/23/2018     Priority: Medium     Dysplastic skin lesion 07/23/2018     Priority: Medium     Iliotibial band syndrome affecting lower leg, right 12/21/2017     Priority: Medium     Chondromalacia of right patellofemoral joint 12/21/2017     Priority: Medium     Upper GI bleed - suspected 07/01/2017     Priority: Medium     Tobacco use disorder 07/01/2017     Priority: Medium     History of pseudoseizure 07/01/2017     Priority: Medium     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) 03/10/2017     Priority: Medium     Lumbar disc disease with radiculopathy 02/23/2017     Priority: Medium     S/P lumbar fusion 02/23/2017     Priority: Medium     Dr. YOVANI Millan, 2/23/17       Vitamin D deficiency 01/06/2017     Priority: Medium     Atypical mole 01/06/2017     Priority: Medium     Mild persistent asthma without complication 12/02/2016     Priority: Medium     Chronic bilateral low back pain with left-sided sciatica 12/02/2016     Priority: Medium     Bee sting allergy 09/16/2016     Priority: Medium     Gastroesophageal reflux disease without esophagitis 08/26/2016      Priority: Medium     Herpes simplex virus infection 11/12/2015     Priority: Medium     Adjustment disorder with mixed anxiety and depressed mood 10/14/2015     Priority: Medium     Marijuana abuse 02/22/2015     Priority: Medium     Bipolar disorder (H) 01/31/2013     Priority: Medium     Problem list name updated by automated process. Provider to review       Chronic pain 02/09/2012     Priority: Medium     Patient is followed by Aura Rosario PA-C for ongoing prescription of pain medication.  All refills should only be approved by this provider, or covering partner.    Medication(s): Norco    Maximum quantity per month: 70  Clinic visit frequency required: Q 2 months     Controlled substance agreement:   Discussed and signed 4/25/17    Encounter-Level CSA - 01/12/2015:                 Controlled Substance Agreement - Scan on 1/26/2015  9:14 AM : Controlled Medication Agreement-01/12/15 (below)            Pain Clinic evaluation in the past: Yes       Date/Location:   [facility], fall 2016    DIRE Total Score(s):    4/25/2017   Total Score 17       Last [facility] website verification:  done on 4/25/17 by LOVE Maki   [email or web address]           Anxiety 09/22/2011     Priority: Medium     Mild major depression (H) 08/29/2011     Priority: Medium     Mild intermittent asthma 10/12/2009     Priority: Medium     Overview:   Formatting of this note might be different from the original.  Action plan: See letter 4/10/2013   ATAQ:   Asthma Data 4/10/2013   Date completed 4/10/2013   Missed daily activities no = 0   Wake at night no = 0   Believe asthma in control yes = 0   ARIN use yes   Maximum ARIN use in 1 day 1-4 = 0   ATAQ score 0 = well controlled   Asthma ED visits in past 12 mos 0   Asthma hospitalizations in past 12 mos 0     Current Outpatient Prescriptions   Medication Sig     albuterol (PROVENTIL) 0.083 % neb solution Inhale 3 mL via a nebulizer every 4 hours if needed for  Shortness Of Breath.        Crohn's colitis (H) 08/04/2009     Priority: Medium     Dysmenorrhea 05/11/2007     Priority: Medium     Benign neoplasm of skin of lower limb, including hip 03/16/2004     Priority: Medium     Migraine      Priority: Medium     Dr. Farrar - Neurology.  Now on Inderal.  Seems to be working.  Follow-up 9/08  Problem list name updated by automated process. Provider to review       Hearing loss      Priority: Medium     Congenital  Problem list name updated by automated process. Provider to review       Allergic rhinitis      Priority: Medium     Problem list name updated by automated process. Provider to review        Past Medical History:   Diagnosis Date     Abdominal pain 10/31- 11/4/2005    [facility] admit for Crohn's     Allergic rhinitis, cause unspecified     Allergic rhinitis     Arthritis      C. difficile diarrhea     Past, no current diarrhea.     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, without mention of intractable epilepsy     pseudoseizures diagnosed after extensive neurologic eval     Migraine 07/21/12    D/C 07/22/12-[facility]     Migraine, unspecified, without mention of intractable migraine without mention of status migrainosus     Migraine     Mild intermittent asthma     mild intermittent     Mycoplasma infection in conditions classified elsewhere and of unspecified site      Other chronic pain     Back pain for 6 years     Renal disease     Kidney stones     Unspecified hearing loss     congenital hearing  loss     Past Surgical History:   Procedure Laterality Date     AS REMOVAL OF COCCYX  10/18/2017     C FUSION OF SACROILIAC JOINT  10/26/2018     COLONOSCOPY  7/1/2009    [facility].     COLONOSCOPY N/A 7/17/2019    Procedure: Colonoscopy, With Polypectomy And Biopsy;  Surgeon: Edwin Conde MD;  Location: [facility] OR     COLONOSCOPY WITH CO2 INSUFFLATION N/A 7/17/2019    Procedure: COLONOSCOPY, WITH CO2 INSUFFLATION;  Surgeon: Edwin Conde MD;  Location: [facility]     COMBINED ESOPHAGOSCOPY, GASTROSCOPY, DUODENOSCOPY (EGD) WITH CO2 INSUFFLATION N/A 4/12/2019    Procedure: COMBINED ESOPHAGOSCOPY, GASTROSCOPY, DUODENOSCOPY (EGD) WITH CO2 INSUFFLATION;  Surgeon: Edwin Conde MD;  Location: [facility]     ESOPHAGOSCOPY, GASTROSCOPY, DUODENOSCOPY (EGD), COMBINED N/A 4/12/2019    Procedure: Combined Esophagoscopy, Gastroscopy, Duodenoscopy (Egd), Biopsy Single Or Multiple;  Surgeon: Edwin Conde MD;  Location: [facility]     FISTULOTOMY RECTUM N/A 4/30/2020    Procedure: EXAM UNDER ANESTHESIA, ANUS, parital fistulotomy;  Surgeon: Valentin Sanchez MD;  Location: [facility]     FUSION SPINE POSTERIOR MINIMALLY INVASIVE ONE LEVEL N/A 2/23/2017    Procedure: FUSION SPINE POSTERIOR MINIMALLY INVASIVE ONE LEVEL;  L4-5 Oblique Lateral Lumbar Interbody Fusion   Epidural steroid injection.   Transpedicular Bone marrow aspiration;  Surgeon: Jeniffer Eugene MD;  Location: [facility] OR     HC COLONOSCOPY THRU STOMA WITH BIOPSY  10/29/2003    Impression is that of normal appearing colonoscopy, without evidence of rectal bleeding.     HC COLONOSCOPY THRU STOMA, DIAGNOSTIC  10/00    normal     HC COLONOSCOPY THRU STOMA, DIAGNOSTIC  Oct 2009    Dr López- normal     HC EEG AWAKE AND SLEEP      abnormal     HC MRI BRAIN W/O CONTRAST  12/00    normal     HC REMOVAL GALLBLADDER  8/5/2009    [facility].     HC UGI ENDOSCOPY DIAG W BIOPSY  11/11/09    Normal  esophagus     HC UGI ENDOSCOPY, SIMPLE EXAM  7/00, 10/00    mild chronic esophagitis and duodenitis, neg H pylori     HC UGI ENDOSCOPY, SIMPLE EXAM  01/20/2005    Esophagogastroduodenoscopy, colonoscopy with biopsies.  [facility]     HC UGI ENDOSCOPY, SIMPLE EXAM  7/1/2009    [facility] [place].      UGI ENDOSCOPY, SIMPLE EXAM  11/11/2009    attempted upper GI, pt. could not tolerate procedure:[facility]     ORTHOPEDIC SURGERY  October 19,2011    diskectomy L4-L5     Current Outpatient Medications   Medication Sig Dispense Refill     acetaminophen (TYLENOL) 500 MG tablet Take 2 tablets (1,000 mg) by mouth 3 times daily as needed for mild pain 100 tablet 3     albuterol (PROAIR HFA/PROVENTIL HFA/VENTOLIN HFA) 108 (90 Base) MCG/ACT inhaler Inhale 2 puffs into the lungs every 6 hours as needed for shortness of breath / dyspnea or wheezing 3 Inhaler 3     albuterol (PROVENTIL) (2.5 MG/3ML) 0.083% neb solution Take 1 vial (2.5 mg) by nebulization every 6 hours as needed for shortness of breath / dyspnea or wheezing 50 vial 11     alum & mag hydroxide-simethicone (MAALOX ADVANCED MAX ST) 400-400-40 MG/5ML SUSP suspension Take 15 mLs by mouth every 4 hours as needed for indigestion (mix with lidocaine) 355 mL 11     ARIPiprazole (ABILIFY) 30 MG tablet Take 1 tablet (30 mg) by mouth At Bedtime 30 tablet 1     aspirin (ASA) 81 MG tablet Take 1 tablet (81 mg) by mouth daily 90 tablet 3     azelastine (OPTIVAR) 0.05 % ophthalmic solution Place 1 drop into both eyes 2 times daily 6 mL 11     buprenorphine (BUTRANS) 10 MCG/HR WK patch Place 1 patch onto the skin every 7 days       Buprenorphine HCl (BELBUCA) 300 MCG FILM buccal film Place 300 mcg inside cheek every 12 hours       buPROPion (WELLBUTRIN XL) 150 MG 24 hr tablet Take 1 tablet (150 mg) by mouth every morning 90 tablet 1     busPIRone (BUSPAR) 15 MG tablet Take 1 tablet (15 mg) by mouth 2 times daily 60 tablet 5      carisoprodol (SOMA) 350 MG tablet Take 1 tablet (350 mg) by mouth 3 times daily as needed for muscle spasms 10 tablet 0     cyclobenzaprine (FLEXERIL) 10 MG tablet Take 1 tablet (10 mg) by mouth 3 times daily as needed for muscle spasms or other (back pain) 90 tablet 3     diclofenac (VOLTAREN) 1 % topical gel Place 2 g onto the skin 4 times daily as needed for moderate pain Stop if any gi upset or symptoms 100 g 0     diphenhydrAMINE (BENADRYL) 50 MG capsule Take 1-2 capsules ( mg) by mouth every 6 hours as needed for itching, allergies or sleep 90 capsule 3     DULoxetine (CYMBALTA) 60 MG EC capsule Take 1 capsule (60 mg) by mouth daily 90 capsule 3     EPINEPHrine (ANY BX GENERIC EQUIV) 0.3 MG/0.3ML injection 2-pack Inject 0.3 mLs (0.3 mg) into the muscle once as needed for anaphylaxis 0.6 mL 3     fexofenadine (ALLEGRA) 180 MG tablet Take 1 tablet (180 mg) by mouth daily 90 tablet 3     fluticasone (FLONASE) 50 MCG/ACT nasal spray Spray 1 spray into both nostrils daily 16 g 1     gabapentin (NEURONTIN) 300 MG capsule Take 1 capsule (300 mg) by mouth 3 times daily 270 capsule 3     hydrochlorothiazide (HYDRODIURIL) 12.5 MG tablet Take 1 tablet (12.5 mg) by mouth daily 30 tablet 1     HYDROcodone-acetaminophen (NORCO) 5-325 MG tablet        lidocaine (XYLOCAINE) 2 % solution Swish and swallow 15 mLs in mouth every 4 hours as needed for other (GERD) ; Max 8 doses/24 hour period. Mix with 15 mLs Maalox or Mylanta. 100 mL 3     losartan (COZAAR) 100 MG tablet Take 1 tablet (100 mg) by mouth daily 90 tablet 3     medical cannabis (Patient's own supply) (The purpose of this order is to document that the patient reports taking medical cannabis.  This is not a prescription, and is not used to certify that the patient has a qualifying medical condition.) 0 Information only 0     medroxyPROGESTERone (DEPO-PROVERA) 150 MG/ML IM injection Inject 1 mL (150 mg) into the muscle every 3 months 3 mL 3     methocarbamol  (ROBAXIN) 750 MG tablet Take 1 tablet (750 mg) by mouth 3 times daily as needed for muscle spasms 60 tablet 3     metroNIDAZOLE (FLAGYL) 500 MG tablet Take 1 tablet (500 mg) by mouth 2 times daily for 7 days 14 tablet 1     NONFORMULARY Apply 30 mLs topically 4 times daily       nystatin (MYCOSTATIN) 500873 UNIT/ML suspension Take 5 mLs (500,000 Units) by mouth 4 times daily (swish and spit) 400 mL 1     ondansetron (ZOFRAN-ODT) 8 MG ODT tab Take 1 tablet (8 mg) by mouth every 8 hours as needed for nausea 15 tablet 0     oxyCODONE-acetaminophen (PERCOCET) 5-325 MG tablet Take 1-2 tablets by mouth every 4 hours as needed for pain 12 tablet 0     pantoprazole (PROTONIX) 40 MG EC tablet Take 1 tablet (40 mg) by mouth 2 times daily Take 30-60 minutes before a meal. 180 tablet 3     polyethylene glycol (MIRALAX) 17 g packet Take 17 g by mouth 2 times daily 60 packet 0     prazosin (MINIPRESS) 1 MG capsule Take 1 mg by mouth At Bedtime  5     prochlorperazine (COMPAZINE) 10 MG tablet Take 1 tablet (10 mg) by mouth every 6 hours as needed for nausea or vomiting 60 tablet 11     rizatriptan (MAXALT-MLT) 5 MG ODT Take 1-2 tablets (5-10 mg) by mouth at onset of headache for migraine May repeat in 2 hours. Max 6 tablets/24 hours. 18 tablet 3     sucralfate (CARAFATE) 1 GM tablet Take 1 tablet (1 g) by mouth 4 times daily as needed (heartburn) 360 tablet 3     valACYclovir (VALTREX) 1000 mg tablet 2 tabs at onset of mouth sores.  Repeat dose in 12 hours.         Allergies   Allergen Reactions     Iohexol Hives     Other reaction(s): Hives  IV contrast; patient states she tolerates contrast if she gets benadryl before  IV contrast; patient states she tolerates contrast if she gets benadryl before     Ativan [Lorazepam] Hives     At the IV site     Baclofen      hives     Bees Hives, Swelling and Difficulty breathing     Caffeine      Contrast Dye      Hives,   Updated 5/10/2016 CT Contrast.     Dilaudid [Hydromorphone] Itching      Iodine Hives     Methocarbamol Swelling     Metoclopramide      Other reaction(s): Tremors  LW Reaction: shaking/sweating     Midazolam      Other reaction(s): Agitation     Monosodium Glutamate      Morphine Other (See Comments)     Difficulty with urination     Nsaids Other (See Comments)     GI BLEED x2     Other (Do Not Use) Other (See Comments)     Xanaflex- pt becomes disoriented and loses bladder control     Reglan [Metoclopramide Hcl] Other (See Comments)     shaking     Soma [Carisoprodol] Visual Disturbance     Sleep walking     Tizanidine      Topamax Other (See Comments)     Topamax [Topiramate] Nausea     Tingling  GI/Vomit     Tramadol      Severe Headache, Seizure Risk     Tylenol W/Codeine [Acetaminophen-Codeine] Nausea and Itching     Tylenol 3     Versed Other (See Comments)     Zolmitriptan      Makes face feel like its twitching     Droperidol Anxiety     Flu Virus Vaccine Rash      Arm swelling        Social History     Tobacco Use     Smoking status: Current Every Day Smoker     Packs/day: 0.25     Years: 5.00     Pack years: 1.25     Types: Cigarettes     Smokeless tobacco: Never Used   Substance Use Topics     Alcohol use: Not Currently     Comment: Not since last July 2018       History   Drug Use     Types: Marijuana     Comment: Medical Marijuana currently-- More CBD            Objective   /76   Pulse 84   Temp 97.8  F (36.6  C) (Temporal)   Resp 16   Wt 86.5 kg (190 lb 12.8 oz)   SpO2 99%   BMI 36.05 kg/m    Physical Exam  GENERAL APPEARANCE: healthy, alert and no distress  EYES: Eyes grossly normal to inspection, PERRLA, conjunctivae and sclerae without injection or discharge, EOM intact   RESP: Lungs clear to auscultation - no rales, rhonchi or wheezes    CV: Regular rates and rhythm, normal S1 S2, no S3 or S4, no murmur, click or rub, no peripheral edema and peripheral pulses strong and symmetric bilaterally   MS: No musculoskeletal defects are noted and gait is age  appropriate without ataxia   SKIN: No suspicious lesions or rashes, hydration status appears adeuqate with normal skin turgor   PSYCH: Alert and oriented x3; speech- coherent , normal rate and volume; able to articulate logical thoughts, able to abstract reason, no tangential thoughts, no hallucinations or delusions, mentation appears normal, Mood is euthymic. Affect is appropriate for this mood state and bright. Thought content is free of suicidal ideation, hallucinations, and delusions. Dress is adequate and upkept. Eye contact is good during conversation.       Recent Labs   Lab Test 08/15/20  2324 08/06/20  1043  05/13/20  1557  09/02/19 08/30/19 09/13/18  1431   HGB 14.5  --   --  13.9   < >  --   --    < >  --      --   --  254   < >  --   --    < >  --    INR  --   --   --   --   --  1.0 1.1   < >  --     137  --  142   < >  --   --    < >  --    POTASSIUM 3.6 4.8   < > 4.1   < > 4.1 3.9   < >  --    CR 0.96 0.89   < > 0.74   < > 0.96 1.15*   < >  --    A1C  --  4.9  --   --   --   --   --   --  5.0    < > = values in this interval not displayed.        PRE-OP Diagnostics:  Labs pending at this time.  Results will be reviewed when available.  EKG: appears normal, NSR, normal axis, normal intervals, no acute ST/T changes c/w ischemia, no LVH by voltage criteria, unchanged from previous tracings         Assessment & Plan   The proposed surgical procedure is considered LOW risk.    REVISED CARDIAC RISK INDEX  The patient has the following serious cardiovascular risks for perioperative complications:  No serious cardiac risks = 0 points    INTERPRETATION: 0 points: Class I (very low risk - 0.4% complication rate)         ICD-10-CM    1. Preop general physical exam  Z01.818 EKG 12-lead complete w/read - Clinics     CBC with platelets     Comprehensive metabolic panel (BMP + Alb, Alk Phos, ALT, AST, Total. Bili, TP)   2. Chronic bilateral low back pain with left-sided sciatica  M54.42     G89.29    3.  S/P lumbar fusion  Z98.1    4. Mild intermittent asthma without complication  J45.20    5. Crohn's disease of colon with fistula (H)  K50.113    6. Lymphedema  I89.0 furosemide (LASIX) 20 MG tablet   7. Hypertension goal BP (blood pressure) < 130/80  I10    8. Bipolar disorder, current episode mixed, severe, without psychotic features (H)  F31.63    9. Mild major depression (H)  F32.0        The patient has the following additional risks and recommendations for perioperative complications:    ANEMIA/BLEEDING/CLOTTING:    - Significant bleeding history - mainly GI bleeding   - Monitor hemoglobin if significant blood loss      MEDICATION INSTRUCTIONS:  Patient is to take all scheduled medications on the day of surgery EXCEPT for modifications listed below:    Anticoagulant or Antiplatelet Medication Use   - Patient is undergoing an interventional pain or spine procedure. Will consult with Pain Specialist about holding antiplatelet and anticoagulant medications.    CARDIAC Medications:   - ACE/ARB: HOLD due to exceptional risk of hypotension during surgery.     RECOMMENDATION:  APPROVAL GIVEN to proceed with proposed procedure pending review of diagnostic evaluation.    Return in about 2 weeks (around 9/10/2020).    Signed Electronically by: Aura Rosario PA-C    Copy of this evaluation report is provided to requesting physician.    [facility]    Revised Cardiac Risk Index

## 2020-08-24 NOTE — TELEPHONE ENCOUNTER
Reason for call:  Patient reporting a symptom    Symptom or request: Pain and redness around tailbone    Duration (how long have symptoms been present): few days    Have you been treated for this before? No    Additional comments: Patient is asking to speak with a nurse about this issue. She says it also hurts to sit. 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

## 2020-08-24 NOTE — PATIENT INSTRUCTIONS
"  - Furosemide (Lasix) 1 tablet per day as needed for swelling     - Day of surgery      Hold any not essential medications if able to         Gabapentin? Flexeril?      DO NOT take Losartan (?)      TAKE the rest of your scheduled medications       Preparing for Your Surgery  Getting started  A surgery nurse will call you to review your health history and instructions. They will give you an arrival time based on your scheduled surgery time.  Please be ready to share the following:    Your doctor's clinic name and phone number    Your medical, surgical and anesthesia history    A list of allergies and sensitivities    A list of medicines, including herbal treatments and over-the-counter drugs    Whether the patient has a legal guardian (ask how to send us the papers in advance)  If your child is having surgery, please ask for a copy of Preparing for Your Child's Surgery.    Preparing for surgery    Within 30 days of surgery: Have an exam at your family clinic (primary care clinic), or go to a pre-operative clinic. This exam is called a \"History and Physical,\" or H&P.    At your H&P exam, talk to your care team about all medicines you take. If you need to stop any medicines before surgery, ask when to start taking them again.  ? We do this for your safety. Many medicines can make you bleed too much during surgery. Some change how well surgery (anesthesia) drugs work.    Call your insurance company to see what it will and won't pay for. Ask if they need to pre-approve the surgery. (If no insurance, call [phone].)    Call your surgeon's clinic if there's any change in your health. This includes signs of a cold or flu (sore throat, runny nose, cough, rash, fever). It also includes a scrape or scratch near the surgery site.    If you have questions on the day of surgery, call your surgery center.  Eating and drinking guidelines  For your safety: Unless your surgeon tells you otherwise, follow the guidelines " below.    Eat and drink as usual until 8 hours before surgery. After that, no food or milk.    Drink clear liquids until 2 hours before surgery. These are liquids you can see through, like water, Gatorade and Propel Water. You may also have black coffee and tea (no cream or milk).    Nothing by mouth within 2 hours of surgery. This includes gum, candy and breath mints.    Stop alcohol the midnight before surgery.    If your family clinic tells you to take medicine on the morning of surgery, it's okay to take it with a sip of water.  Preventing infection    Shower or bathe the night before and morning of your surgery. Follow the instructions your clinic gave you. (If no instructions, use regular soap.)    Don't shave or clip hair near your surgery site. This can lead to skin infection.    Don't smoke the morning of surgery. Smoking increases the risk of infection. You may chew nicotine gum up to 2 hours before surgery. A nicotine patch is okay.  ? Note: Some surgeries require you to completely quit smoking and nicotine. Check with your surgeon.    Your care team will make every effort to keep you safe from infection. We will:  ? Clean our hands often with soap and water (or an alcohol-based hand rub).  ? Clean the skin at your surgery site with a special soap that kills germs. We'll also remove hair from the site as needed.  ? Wear special hair covers, masks, gowns and gloves during surgery.  ? Give antibiotic medicine, if prescribed. Not all surgeries need antibiotics.  What to bring on the day of surgery    Photo ID and insurance card    Copy of your health care directive, if you have one    Glasses and hearing aides (bring cases)  ? You can't wear contacts during surgery    Inhaler and eye drops, if you use them (tell us about these when you arrive)    CPAP machine or breathing device, if you use them    A few personal items, if spending the night    If you have . . .  ? A pacemaker or ICD (cardiac defibrillator):  Bring the ID card.  ? An implanted stimulator: Bring the remote control.  ? A legal guardian: Bring a copy of the certified (court-stamped) guardianship papers.  Please remove any jewelry, including body piercings. Leave jewelry and other valuables at home.  If you're going home the day of surgery  Important: If you don't follow the rules below, we must cancel your surgery.     Arrange for someone to drive you home after surgery. You may not drive, take a taxi or take public transportation by yourself (unless you'll have local anesthesia only).    Arrange for a responsible adult to stay with you overnight. If you don't, we may keep you in the hospital overnight, and you may need to pay the costs yourself.  Questions?   If you have any questions for your care team, list them here: _________________________________________________________________________________________________________________________________________________________________________________________________________________________________________________________________________________________________________________________  For informational purposes only. Not to replace the advice of your health care provider. Copyright   9915-2154 [employer] [facility]. All rights reserved. Clinically reviewed by Susan Jaime MD. [employer] [id number] - REV 07/19.

## 2020-08-24 NOTE — TELEPHONE ENCOUNTER
"I spoke with the pt who states she is having a lot of pain when she is sitting. \"around the tailbone area\" started a few weeks ago. Last night she could not sleep because of the pain. She feels a lump that is about the size of a quarter. She was sent in the ED for lower back pain. She did not tell them about this pain. Also noticed some blood in her stool about an hour ago. No clots. The blood is mixed with the stool and also when she wipes. Only has blood when having a BM. Lower back pain is 6/10 and tailbone are is a 6/10. At its worse it was 8/10. Tried to take a bath, not able to sit because it is so sore. Pt states she is \"getting ready to go to the ED\" because the pain is so bad. States she called [facility] and they will not give her anything for the pain. Routing to [facility] as LJ.    Claudine Jimenez, MSN, RN      " Gonsalo Cole is a 42 year old male presenting with thoracic pain.    Denies Latex allergy or sensitivity.     There were no vitals taken for this visit.    No changes to patient's medical history or social history since their last visit.    ALLERGIES:   Allergen Reactions   • Codeine Phosphate ANAPHYLAXIS     Pt not sure of reaction he had as a child       Gonsalo notes that he currently is not a smoker.

## 2020-08-24 NOTE — PROGRESS NOTES
Clinic Care Coordination Contact  [facility]/Voicemail       Clinical Data: CHW Outreach  Outreach attempted x 2. Left message on patient's voicemail with call back information and requested return call.    Plan: CHW will send care coordination introduction letter with care coordinator contact information and explanation of care coordination services via [employer].     CHW will do no further outreaches at this time.    Elena STEVEN Community Health Worker  Clinic Care Coordination  [facility] : [facility]  Phone: [phone]        Referral made off of discharge report.

## 2020-08-26 ENCOUNTER — MYC MEDICAL ADVICE (OUTPATIENT)
Dept: FAMILY MEDICINE | Facility: OTHER | Age: 30
End: 2020-08-26

## 2020-08-26 DIAGNOSIS — G89.4 CHRONIC PAIN SYNDROME: ICD-10-CM

## 2020-08-26 RX ORDER — METHOCARBAMOL 750 MG/1
750 TABLET, FILM COATED ORAL 3 TIMES DAILY PRN
Qty: 60 TABLET | Refills: 3 | Status: SHIPPED | OUTPATIENT
Start: 2020-08-26 | End: 2021-01-14

## 2020-08-26 NOTE — TELEPHONE ENCOUNTER
Sent refill request to refill pool.  Will also send to provider as patient has message for provider as well.

## 2020-08-27 ENCOUNTER — OFFICE VISIT (OUTPATIENT)
Dept: FAMILY MEDICINE | Facility: OTHER | Age: 30
End: 2020-08-27
Payer: MEDICARE

## 2020-08-27 VITALS
SYSTOLIC BLOOD PRESSURE: 104 MMHG | HEART RATE: 84 BPM | DIASTOLIC BLOOD PRESSURE: 76 MMHG | WEIGHT: 190.8 LBS | BODY MASS INDEX: 36.05 KG/M2 | OXYGEN SATURATION: 99 % | TEMPERATURE: 97.8 F | RESPIRATION RATE: 16 BRPM

## 2020-08-27 DIAGNOSIS — J45.20 MILD INTERMITTENT ASTHMA WITHOUT COMPLICATION: ICD-10-CM

## 2020-08-27 DIAGNOSIS — I10 HYPERTENSION GOAL BP (BLOOD PRESSURE) < 130/80: ICD-10-CM

## 2020-08-27 DIAGNOSIS — I89.0 LYMPHEDEMA: ICD-10-CM

## 2020-08-27 DIAGNOSIS — G89.29 CHRONIC BILATERAL LOW BACK PAIN WITH LEFT-SIDED SCIATICA: ICD-10-CM

## 2020-08-27 DIAGNOSIS — Z98.1 S/P LUMBAR FUSION: ICD-10-CM

## 2020-08-27 DIAGNOSIS — M54.42 CHRONIC BILATERAL LOW BACK PAIN WITH LEFT-SIDED SCIATICA: ICD-10-CM

## 2020-08-27 DIAGNOSIS — F32.0 MILD MAJOR DEPRESSION (H): ICD-10-CM

## 2020-08-27 DIAGNOSIS — K50.113 CROHN'S DISEASE OF COLON WITH FISTULA (H): ICD-10-CM

## 2020-08-27 DIAGNOSIS — Z01.818 PREOP GENERAL PHYSICAL EXAM: Primary | ICD-10-CM

## 2020-08-27 DIAGNOSIS — F31.63 BIPOLAR DISORDER, CURRENT EPISODE MIXED, SEVERE, WITHOUT PSYCHOTIC FEATURES (H): ICD-10-CM

## 2020-08-27 LAB
ALBUMIN SERPL-MCNC: 4 G/DL (ref 3.4–5)
ALP SERPL-CCNC: 103 U/L (ref 40–150)
ALT SERPL W P-5'-P-CCNC: 43 U/L (ref 0–50)
ANION GAP SERPL CALCULATED.3IONS-SCNC: 4 MMOL/L (ref 3–14)
AST SERPL W P-5'-P-CCNC: 20 U/L (ref 0–45)
BILIRUB SERPL-MCNC: <0.1 MG/DL (ref 0.2–1.3)
BUN SERPL-MCNC: 16 MG/DL (ref 7–30)
CALCIUM SERPL-MCNC: 9.7 MG/DL (ref 8.5–10.1)
CHLORIDE SERPL-SCNC: 106 MMOL/L (ref 94–109)
CO2 SERPL-SCNC: 28 MMOL/L (ref 20–32)
CREAT SERPL-MCNC: 0.78 MG/DL (ref 0.52–1.04)
ERYTHROCYTE [DISTWIDTH] IN BLOOD BY AUTOMATED COUNT: 11.8 % (ref 10–15)
GFR SERPL CREATININE-BSD FRML MDRD: >90 ML/MIN/{1.73_M2}
GLUCOSE SERPL-MCNC: 104 MG/DL (ref 70–99)
HCT VFR BLD AUTO: 44.4 % (ref 35–47)
HGB BLD-MCNC: 15.5 G/DL (ref 11.7–15.7)
MCH RBC QN AUTO: 33.9 PG (ref 26.5–33)
MCHC RBC AUTO-ENTMCNC: 34.9 G/DL (ref 31.5–36.5)
MCV RBC AUTO: 97 FL (ref 78–100)
PLATELET # BLD AUTO: 278 10E9/L (ref 150–450)
POTASSIUM SERPL-SCNC: 4.6 MMOL/L (ref 3.4–5.3)
PROT SERPL-MCNC: 8 G/DL (ref 6.8–8.8)
RBC # BLD AUTO: 4.57 10E12/L (ref 3.8–5.2)
SODIUM SERPL-SCNC: 138 MMOL/L (ref 133–144)
WBC # BLD AUTO: 4.1 10E9/L (ref 4–11)

## 2020-08-27 PROCEDURE — 85027 COMPLETE CBC AUTOMATED: CPT | Performed by: PHYSICIAN ASSISTANT

## 2020-08-27 PROCEDURE — 36415 COLL VENOUS BLD VENIPUNCTURE: CPT | Performed by: PHYSICIAN ASSISTANT

## 2020-08-27 PROCEDURE — 80053 COMPREHEN METABOLIC PANEL: CPT | Performed by: PHYSICIAN ASSISTANT

## 2020-08-27 PROCEDURE — 93000 ELECTROCARDIOGRAM COMPLETE: CPT | Performed by: PHYSICIAN ASSISTANT

## 2020-08-27 PROCEDURE — 99215 OFFICE O/P EST HI 40 MIN: CPT | Performed by: PHYSICIAN ASSISTANT

## 2020-08-27 RX ORDER — FUROSEMIDE 20 MG
20 TABLET ORAL DAILY PRN
Qty: 30 TABLET | Refills: 1 | Status: SHIPPED | OUTPATIENT
Start: 2020-08-27 | End: 2020-09-22

## 2020-08-27 ASSESSMENT — PAIN SCALES - GENERAL: PAINLEVEL: MODERATE PAIN (4)

## 2020-08-28 ENCOUNTER — MYC MEDICAL ADVICE (OUTPATIENT)
Dept: FAMILY MEDICINE | Facility: OTHER | Age: 30
End: 2020-08-28

## 2020-09-03 ENCOUNTER — TELEPHONE (OUTPATIENT)
Dept: FAMILY MEDICINE | Facility: OTHER | Age: 30
End: 2020-09-03

## 2020-09-03 ENCOUNTER — NURSE TRIAGE (OUTPATIENT)
Dept: FAMILY MEDICINE | Facility: OTHER | Age: 30
End: 2020-09-03

## 2020-09-03 NOTE — TELEPHONE ENCOUNTER
"Calf pain starting 30 minutes ago. Upon discussion patient struggles with chronic leg pain and edema. Stated she is having swelling below the knee. No numbness or tingling, able to walk, no redness or warmth. Right leg feels worse than left. Denies fevers. Able to assess cap refill, this is normal. Blood pressure is good today. No chest pain or shortness of breath.  Recommend being evaluated, patient concerned of pain level. Advised ED/Urgent care. Patient declined and stated she will take a nap and elevate her legs. Patient states she deal with this all the time.    Michelle Avila RN    1. ONSET: \"When did the pain start?\"       Chronic, but worse over the last 30 minutes  2. LOCATION: \"Where is the pain located?\"       Bilateral calf  3. PAIN: \"How bad is the pain?\"    (Scale 1-10; or mild, moderate, severe)    -  MILD (1-3): doesn't interfere with normal activities     -  MODERATE (4-7): interferes with normal activities (e.g., work or school) or awakens from sleep, limping     -  SEVERE (8-10): excruciating pain, unable to do any normal activities, unable to walk      Moderate-severe, tried calling back doctor for more pain medications  4. WORK OR EXERCISE: \"Has there been any recent work or exercise that involved this part of the body?\"       none  5. CAUSE: \"What do you think is causing the leg pain?\"      Chronic symptoms  6. OTHER SYMPTOMS: \"Do you have any other symptoms?\" (e.g., chest pain, back pain, breathing difficulty, swelling, rash, fever, numbness, weakness)      none  7. PREGNANCY: \"Is there any chance you are pregnant?\" \"When was your last menstrual period?\"      NA            Additional Information    Negative: Followed a hip injury    Negative: Followed a knee injury    Negative: Followed an ankle or foot injury    Negative: Back pain radiating (shooting) into leg(s)    Negative: Foot pain is the main symptom    Negative: Ankle pain is the main symptom    Negative: Knee pain is the main " symptom    Negative: Leg swelling is the main symptom    Negative: Chest pain    Negative: Difficulty breathing    Negative: Entire foot is cool or blue in comparison to other side    Negative: Unable to walk    Negative: Fever and red area (or area very tender to touch)    Negative: Fever and swollen joint    Negative: Thigh or calf pain in only one leg and present > 1 hour    Negative: Thigh, calf, or ankle swelling in only one leg    Negative: Thigh, calf, or ankle swelling in both legs, but one side is definitely more swollen    Negative: History of prior 'blood clot' in leg or lungs (i.e., deep vein thrombosis, pulmonary embolism)    Negative: History of inherited increased risk of blood clots (e.g., factor 5 Leiden, antithrombin 3, protein C or protein S deficiency, prothrombin mutation)    Negative: Recent illness requiring prolonged bed rest (immobilization)    Negative: Hip or leg fracture in past 2 months (e.g., or had cast on leg or ankle)    Negative: Major surgery in the past two months    Negative: Cancer treatment in the past two months (or has cancer now)    Negative: Recent long-distance travel with prolonged time in car, bus, plane, or train (i.e., within past 2 weeks; 6 or more hours duration)    Negative: Patient sounds very sick or weak to the triager    Negative: SEVERE pain (e.g., excruciating, unable to do any normal activities)    Negative: Cast on leg or ankle and now has increasing pain    Negative: Red area or streak and large (> 2 in. or 5 cm)    Negative: Painful rash with multiple small blisters grouped together (i.e., dermatomal distribution or 'band' or 'stripe')    Negative: Looks like a boil, infected sore, deep ulcer, or other infected rash (spreading redness, pus)    Negative: Localized rash is very painful (no fever)    Negative: Numbness in a leg or foot (i.e., loss of sensation)    Negative: Localized pain, redness or hard lump along vein    Negative: Patient wants to be  seen    Negative: Swollen joint with no fever or redness    Negative: Leg pain which occurs after walking a certain distance and disappears with rest, AND age > 50    Negative: Leg pain in shins (front of lower legs) and it occurs with running or jumping exercise (e.g., jogging, basketball)    Negative: Looks like a broken bone or dislocated joint (e.g., crooked or deformed)    Negative: Sounds like a life-threatening emergency to the triager    MODERATE pain (e.g., interferes with normal activities, limping) and present > 3 days    Protocols used: LEG PAIN-A-OH

## 2020-09-03 NOTE — TELEPHONE ENCOUNTER
Reason for Call:  Other Pre-op paperwork    Detailed comments: Maya is calling in today in regards to a pre-op that was done on patient in august for her spinal cord stimulater implant on 09/08/20. Can someone fax the pre-op to [phone]. Thank you    Phone Number Patient can be reached at: Other phone number:  [phone] extension#:[phone]    Best Time: anytime    Can we leave a detailed message on this number? YES    Call taken on 9/3/2020 at 1:43 PM by Loida Hillman

## 2020-09-03 NOTE — PROGRESS NOTES
"Katy Islas is a 30 year old female who is being evaluated via a billable telephone visit.      The patient has been notified of following:     \"This telephone visit will be conducted via a call between you and your physician/provider. We have found that certain health care needs can be provided without the need for a physical exam.  This service lets us provide the care you need with a short phone conversation.  If a prescription is necessary we can send it directly to your pharmacy.  If lab work is needed we can place an order for that and you can then stop by our lab to have the test done at a later time.    Telephone visits are billed at different rates depending on your insurance coverage. During this emergency period, for some insurers they may be billed the same as an in-person visit.  Please reach out to your insurance provider with any questions.    If during the course of the call the physician/provider feels a telephone visit is not appropriate, you will not be charged for this service.\"    Patient has given verbal consent for Telephone visit?  Yes    What phone number would you like to be contacted at? [phone]    How would you like to obtain your AVS? Tashiahart    Subjective     Katy Islas is a 30 year old female who presents via phone visit today for the following health issues:    HPI    Blood pressures & Swelling   - Swelling is gone   - If very active, feet will get sore   - Left foot in arch - feels like a stone   - BP readings      Yesterday 145/86 (nervous for surgery)      Has to get a new BP cuff     Mood   - Doing great   - Apt with psychiatry is 9/15/20 at 3 pm & counselor will be there during first       Pain   - Surgery went well yesterday   - No pain   - Decreased gabapentin dose by pain clinic         Review of Systems   Constitutional, HEENT, cardiovascular, pulmonary, gi and gu systems are negative, except as otherwise noted.       Objective      Vitals:  No vitals were " obtained today due to virtual visit.    healthy, alert and no distress  PSYCH: Alert and oriented times 3; coherent speech, normal   rate and volume, able to articulate logical thoughts, able   to abstract reason, no tangential thoughts, no hallucinations   or delusions  Her affect is normal  RESP: No cough, no audible wheezing, able to talk in full sentences  Remainder of exam unable to be completed due to telephone visits    Diagnostics  None           Assessment & Plan       ICD-10-CM    1. Hypertension goal BP (blood pressure) < 130/80  I10    2. Adjustment disorder with mixed anxiety and depressed mood  F43.23    3. Anxiety  F41.9    4. Chronic bilateral low back pain with left-sided sciatica  M54.42 cyclobenzaprine (FLEXERIL) 10 MG tablet    G89.29      - Patient here for her 2 week recheck due to high use of medical facilities     - She reports got spinal cord stimulator yesterday, doing really well, no pain  - Reports only needed lasix a few times and swelling is much improved   - BP is in normal range   - Mood is going well      Has apt soon with psychiatry, continues weekly counseling   - Reviewed medication use and side effects, refilled as needed   - Discussed since doing well, pushing visit out to 1 month, she is in agreement with this   - Encouraged her to reach out to her GYN provider once healed from spinal cord stimulator     Due to language barrier, an  was present during this phone visit with this patient.       The patient indicates understanding of these issues and agrees with the plan.    Return in about 1 month (around 10/9/2020).    Aura Rosario PA-C  [facility]    Phone call duration:  8 minutes and 30 seconds

## 2020-09-03 NOTE — TELEPHONE ENCOUNTER
Reason for call:  Patient reporting a symptom    Symptom or request: Severe pain in calves    Duration (how long have symptoms been present): 30 minutes ago    Have you been treated for this before? No    Additional comments: Patient is having severe pain in calves and dizziness from the pain. Pain making it unable to walk. Please triage.     Phone Number patient can be reached at:  Home number on file [phone] (home)    Best Time:  any    Can we leave a detailed message on this number:  YES    Call taken on 9/3/2020 at 11:54 AM by Genie Luo

## 2020-09-09 ENCOUNTER — VIRTUAL VISIT (OUTPATIENT)
Dept: FAMILY MEDICINE | Facility: OTHER | Age: 30
End: 2020-09-09
Payer: MEDICARE

## 2020-09-09 DIAGNOSIS — M54.42 CHRONIC BILATERAL LOW BACK PAIN WITH LEFT-SIDED SCIATICA: ICD-10-CM

## 2020-09-09 DIAGNOSIS — I10 HYPERTENSION GOAL BP (BLOOD PRESSURE) < 130/80: Primary | ICD-10-CM

## 2020-09-09 DIAGNOSIS — F43.23 ADJUSTMENT DISORDER WITH MIXED ANXIETY AND DEPRESSED MOOD: ICD-10-CM

## 2020-09-09 DIAGNOSIS — F41.9 ANXIETY: ICD-10-CM

## 2020-09-09 DIAGNOSIS — G89.29 CHRONIC BILATERAL LOW BACK PAIN WITH LEFT-SIDED SCIATICA: ICD-10-CM

## 2020-09-09 DIAGNOSIS — I89.0 LYMPHEDEMA: ICD-10-CM

## 2020-09-09 PROCEDURE — 99441 ZZC PHYSICIAN TELEPHONE EVALUATION 5-10 MIN: CPT | Mod: 95 | Performed by: PHYSICIAN ASSISTANT

## 2020-09-09 RX ORDER — CYCLOBENZAPRINE HCL 10 MG
10 TABLET ORAL 3 TIMES DAILY PRN
Qty: 90 TABLET | Refills: 3 | Status: SHIPPED | OUTPATIENT
Start: 2020-09-09 | End: 2021-05-17

## 2020-09-10 PROBLEM — I89.0 LYMPHEDEMA: Status: ACTIVE | Noted: 2020-09-10

## 2020-09-10 NOTE — TELEPHONE ENCOUNTER
LM asking mom to callback. Please let her know the referral has been placed for ID.    Claudine Jimenez, MSN, RN     Per lab. HIV lab will be drawn by phlebotomist patient received, as per rn adán when giving handoff, he endorsed the patient to 2E an hour ago and is pending transport, after an hour and a half of talking to  about transporting the patient,  stated he is still waiting for patient to be transported due to patient's condition and priority    0830pm- patien transported to ct scan then floor

## 2020-09-12 ENCOUNTER — HOSPITAL ENCOUNTER (EMERGENCY)
Facility: CLINIC | Age: 30
Discharge: HOME | End: 2020-09-12
Attending: FAMILY MEDICINE | Admitting: FAMILY MEDICINE
Payer: MEDICARE

## 2020-09-12 VITALS
TEMPERATURE: 98.2 F | HEART RATE: 91 BPM | SYSTOLIC BLOOD PRESSURE: 139 MMHG | RESPIRATION RATE: 16 BRPM | OXYGEN SATURATION: 96 % | DIASTOLIC BLOOD PRESSURE: 94 MMHG

## 2020-09-12 DIAGNOSIS — M54.50 CHRONIC LOW BACK PAIN, UNSPECIFIED BACK PAIN LATERALITY, UNSPECIFIED WHETHER SCIATICA PRESENT: ICD-10-CM

## 2020-09-12 DIAGNOSIS — G89.29 CHRONIC LOW BACK PAIN, UNSPECIFIED BACK PAIN LATERALITY, UNSPECIFIED WHETHER SCIATICA PRESENT: ICD-10-CM

## 2020-09-12 PROCEDURE — 99283 EMERGENCY DEPT VISIT LOW MDM: CPT | Mod: Z6 | Performed by: FAMILY MEDICINE

## 2020-09-12 PROCEDURE — 99282 EMERGENCY DEPT VISIT SF MDM: CPT | Performed by: FAMILY MEDICINE

## 2020-09-12 NOTE — ED PROVIDER NOTES
History     Chief Complaint   Patient presents with     Back Pain     HPI  Katy Islas is a 30 year old female who presents to the emergency department because of concerns of worsening back pain and worried that her recent nerve stimulator is not placed correctly.  She is worried that the leads may have dislodged.  Patient is deaf and is being interpreted by her mom.  Patient refuses to have the .  Patient denies any recent trauma.  Patient is a chronic back pain patient and is managed by pain clinic and has a pain contract.  Patient is currently on Suboxone and on narcotics and muscle relaxants.  Patient denies any bowel or bladder incontinence.  She continues to have bilateral lower leg pain.    Allergies:  Allergies   Allergen Reactions     Iohexol Hives     Other reaction(s): Hives  IV contrast; patient states she tolerates contrast if she gets benadryl before  IV contrast; patient states she tolerates contrast if she gets benadryl before     Ativan [Lorazepam] Hives     At the IV site     Baclofen      hives     Bees Hives, Swelling and Difficulty breathing     Caffeine      Contrast Dye      Hives,   Updated 5/10/2016 CT Contrast.     Dilaudid [Hydromorphone] Itching     Iodine Hives     Methocarbamol Swelling     Metoclopramide      Other reaction(s): Tremors  LW Reaction: shaking/sweating     Midazolam      Other reaction(s): Agitation     Monosodium Glutamate      Morphine Other (See Comments)     Difficulty with urination     Nsaids Other (See Comments)     GI BLEED x2     Other (Do Not Use) Other (See Comments)     Xanaflex- pt becomes disoriented and loses bladder control     Reglan [Metoclopramide Hcl] Other (See Comments)     shaking     Soma [Carisoprodol] Visual Disturbance     Sleep walking     Tizanidine      Topamax Other (See Comments)     Topamax [Topiramate] Nausea     Tingling  GI/Vomit     Tramadol      Severe Headache, Seizure Risk     Tylenol W/Codeine  [Acetaminophen-Codeine] Nausea and Itching     Tylenol 3     Versed Other (See Comments)     Zolmitriptan      Makes face feel like its twitching     Droperidol Anxiety     Flu Virus Vaccine Rash      Arm swelling       Problem List:    Patient Active Problem List    Diagnosis Date Noted     Lymphedema 09/10/2020     Priority: Medium     Patellofemoral pain syndrome of both knees 06/11/2020     Priority: Medium     Primary osteoarthritis of both knees 06/11/2020     Priority: Medium     Abscess of anal and rectal regions 04/27/2020     Priority: Medium     Added automatically from request for surgery [id number]       Rectal pain 04/19/2020     Priority: Medium     Abdominal pain 04/18/2020     Priority: Medium     Morbid obesity (H) 03/10/2020     Priority: Medium     Abnormal uterine bleeding 02/12/2020     Priority: Medium     Added automatically from request for surgery [id number]       Anal fissure 02/04/2020     Priority: Medium     Low hematocrit 02/04/2020     Priority: Medium     Elevated d-dimer 02/04/2020     Priority: Medium     Hypertension goal BP (blood pressure) < 130/80 08/05/2019     Priority: Medium     Bulge of cervical disc without myelopathy 04/24/2019     Priority: Medium     Cervicalgia 04/17/2019     Priority: Medium     Class 1 obesity due to excess calories without serious comorbidity with body mass index (BMI) of 32.0 to 32.9 in adult 04/03/2019     Priority: Medium     Hypophosphatemia 11/01/2018     Priority: Medium     Patellar maltracking, right 09/05/2018     Priority: Medium     Right anterior knee pain 09/05/2018     Priority: Medium     Melanocytic nevus, unspecified location 07/23/2018     Priority: Medium     Dysplastic skin lesion 07/23/2018     Priority: Medium     Iliotibial band syndrome affecting lower leg, right 12/21/2017     Priority: Medium     Chondromalacia of right patellofemoral joint 12/21/2017     Priority: Medium     Upper GI bleed - suspected 07/01/2017     Priority:  Medium     Tobacco use disorder 07/01/2017     Priority: Medium     History of pseudoseizure 07/01/2017     Priority: Medium     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) 03/10/2017     Priority: Medium     Lumbar disc disease with radiculopathy 02/23/2017     Priority: Medium     S/P lumbar fusion 02/23/2017     Priority: Medium     Dr. YOVANI Millan, 2/23/17       Vitamin D deficiency 01/06/2017     Priority: Medium     Atypical mole 01/06/2017     Priority: Medium     Mild persistent asthma without complication 12/02/2016     Priority: Medium     Chronic bilateral low back pain with left-sided sciatica 12/02/2016     Priority: Medium     Bee sting allergy 09/16/2016     Priority: Medium     Gastroesophageal reflux disease without esophagitis 08/26/2016     Priority: Medium     Herpes simplex virus infection 11/12/2015     Priority: Medium     Adjustment disorder with mixed anxiety and depressed mood 10/14/2015     Priority: Medium     Marijuana abuse 02/22/2015     Priority: Medium     Bipolar disorder (H) 01/31/2013     Priority: Medium     Problem list name updated by automated process. Provider to review       Chronic pain 02/09/2012     Priority: Medium     Patient is followed by Aura Rosario PA-C for ongoing prescription of pain medication.  All refills should only be approved by this provider, or covering partner.    Medication(s): Norco    Maximum quantity per month: 70  Clinic visit frequency required: Q 2 months     Controlled substance agreement:   Discussed and signed 4/25/17    Encounter-Level CSA - 01/12/2015:                 Controlled Substance Agreement - Scan on 1/26/2015  9:14 AM : Controlled Medication Agreement-01/12/15 (below)            Pain Clinic evaluation in the past: Yes       Date/Location:   [facility], fall 2016    DIRE Total Score(s):    4/25/2017   Total Score 17       Last [facility] website verification:  done on 4/25/17 by LOVE Maki    [email or web address]           Anxiety 09/22/2011     Priority: Medium     Mild major depression (H) 08/29/2011     Priority: Medium     Mild intermittent asthma 10/12/2009     Priority: Medium     Overview:   Formatting of this note might be different from the original.  Action plan: See letter 4/10/2013   ATAQ:   Asthma Data 4/10/2013   Date completed 4/10/2013   Missed daily activities no = 0   Wake at night no = 0   Believe asthma in control yes = 0   ARIN use yes   Maximum ARIN use in 1 day 1-4 = 0   ATAQ score 0 = well controlled   Asthma ED visits in past 12 mos 0   Asthma hospitalizations in past 12 mos 0     Current Outpatient Prescriptions   Medication Sig     albuterol (PROVENTIL) 0.083 % neb solution Inhale 3 mL via a nebulizer every 4 hours if needed for Shortness Of Breath.        Crohn's colitis (H) 08/04/2009     Priority: Medium     Dysmenorrhea 05/11/2007     Priority: Medium     Benign neoplasm of skin of lower limb, including hip 03/16/2004     Priority: Medium     Migraine      Priority: Medium     Dr. Farrar - Neurology.  Now on Inderal.  Seems to be working.  Follow-up 9/08  Problem list name updated by automated process. Provider to review       Hearing loss      Priority: Medium     Congenital  Problem list name updated by automated process. Provider to review       Allergic rhinitis      Priority: Medium     Problem list name updated by automated process. Provider to review          Past Medical History:    Past Medical History:   Diagnosis Date     Abdominal pain 10/31- 11/4/2005     Allergic rhinitis, cause unspecified      Arthritis      C. difficile diarrhea      Crohn's disease (H)      Crohn's disease (H)      Depression with anxiety 2003     Esophageal reflux      Grand mal seizure disorder (H) 10/8/2013     Hypertension      Intestinal infection due to Clostridium difficile 10/00     Localization-related (focal) (partial) epilepsy and epileptic syndromes with simple partial  seizures, without mention of intractable epilepsy      Migraine 07/21/12     Migraine, unspecified, without mention of intractable migraine without mention of status migrainosus      Mild intermittent asthma      Mycoplasma infection in conditions classified elsewhere and of unspecified site      Other chronic pain      Renal disease      Unspecified hearing loss        Past Surgical History:    Past Surgical History:   Procedure Laterality Date     AS REMOVAL OF COCCYX  10/18/2017     C FUSION OF SACROILIAC JOINT  10/26/2018     COLONOSCOPY  7/1/2009    [facility].     COLONOSCOPY N/A 7/17/2019    Procedure: Colonoscopy, With Polypectomy And Biopsy;  Surgeon: Edwin Conde MD;  Location: [facility] OR     COLONOSCOPY WITH CO2 INSUFFLATION N/A 7/17/2019    Procedure: COLONOSCOPY, WITH CO2 INSUFFLATION;  Surgeon: Edwin Conde MD;  Location: [facility] OR     COMBINED ESOPHAGOSCOPY, GASTROSCOPY, DUODENOSCOPY (EGD) WITH CO2 INSUFFLATION N/A 4/12/2019    Procedure: COMBINED ESOPHAGOSCOPY, GASTROSCOPY, DUODENOSCOPY (EGD) WITH CO2 INSUFFLATION;  Surgeon: Edwin Conde MD;  Location: [facility] OR     ESOPHAGOSCOPY, GASTROSCOPY, DUODENOSCOPY (EGD), COMBINED N/A 4/12/2019    Procedure: Combined Esophagoscopy, Gastroscopy, Duodenoscopy (Egd), Biopsy Single Or Multiple;  Surgeon: Edwin Conde MD;  Location: [facility] OR     FISTULOTOMY RECTUM N/A 4/30/2020    Procedure: EXAM UNDER ANESTHESIA, ANUS, parital fistulotomy;  Surgeon: Valentin Sanchez MD;  Location: [facility]     FUSION SPINE POSTERIOR MINIMALLY INVASIVE ONE LEVEL N/A 2/23/2017    Procedure: FUSION SPINE POSTERIOR MINIMALLY INVASIVE ONE LEVEL;  L4-5 Oblique Lateral Lumbar Interbody Fusion   Epidural steroid injection.   Transpedicular Bone marrow aspiration;  Surgeon: Jeniffer Eugene MD;  Location: [facility] OR     HC COLONOSCOPY THRU STOMA WITH BIOPSY  10/29/2003    Impression is that of normal appearing colonoscopy,  without evidence of rectal bleeding.     HC COLONOSCOPY THRU STOMA, DIAGNOSTIC  10/00    normal     HC COLONOSCOPY THRU STOMA, DIAGNOSTIC  Oct 2009    Dr López- normal     HC EEG AWAKE AND SLEEP      abnormal     HC MRI BRAIN W/O CONTRAST  12/00    normal     HC REMOVAL GALLBLADDER  8/5/2009    [facility].     HC UGI ENDOSCOPY DIAG W BIOPSY  11/11/09    Normal esophagus     HC UGI ENDOSCOPY, SIMPLE EXAM  7/00, 10/00    mild chronic esophagitis and duodenitis, neg H pylori     HC UGI ENDOSCOPY, SIMPLE EXAM  01/20/2005    Esophagogastroduodenoscopy, colonoscopy with biopsies.  [facility]     HC UGI ENDOSCOPY, SIMPLE EXAM  7/1/2009    [facility].      UGI ENDOSCOPY, SIMPLE EXAM  11/11/2009    attempted upper GI, pt. could not tolerate procedure:[facility]     ORTHOPEDIC SURGERY  October 19,2011    diskectomy L4-L5       Family History:    Family History   Problem Relation Age of Onset     Gastrointestinal Disease Brother         severe Crohn's     Neurologic Disorder Brother         Seizures post head injury     Depression Brother      Substance Abuse Brother      Genitourinary Problems Father         kidney stones     Diabetes Father      Heart Disease Father         Open heart surgery     Breast Cancer Maternal Grandmother      Parkinsonism Maternal Grandmother      Cerebrovascular Disease Paternal Grandmother      Cancer Maternal Grandfather         Lung     Cardiovascular Paternal Grandfather         Heart Attack     Substance Abuse Mother         in recovery x1 year      Lung Cancer Paternal Uncle      Cancer Paternal Uncle      Lung Cancer Maternal Uncle      Colon Cancer Maternal Uncle        Social History:  Marital Status:  Single [1]  Social History     Tobacco Use     Smoking status: Current Every Day Smoker     Packs/day: 0.25     Years: 5.00     Pack years: 1.25     Types: Cigarettes     Smokeless tobacco: Never Used   Substance Use  Topics     Alcohol use: Not Currently     Comment: Not since last July 2018     Drug use: Yes     Types: Marijuana     Comment: Medical Marijuana currently-- More CBD        Medications:    acetaminophen (TYLENOL) 500 MG tablet  albuterol (PROAIR HFA/PROVENTIL HFA/VENTOLIN HFA) 108 (90 Base) MCG/ACT inhaler  albuterol (PROVENTIL) (2.5 MG/3ML) 0.083% neb solution  alum & mag hydroxide-simethicone (MAALOX ADVANCED MAX ST) 400-400-40 MG/5ML SUSP suspension  ARIPiprazole (ABILIFY) 30 MG tablet  aspirin (ASA) 81 MG tablet  azelastine (OPTIVAR) 0.05 % ophthalmic solution  buprenorphine (BUTRANS) 10 MCG/HR WK patch  Buprenorphine HCl (BELBUCA) 300 MCG FILM buccal film  buPROPion (WELLBUTRIN XL) 150 MG 24 hr tablet  busPIRone (BUSPAR) 15 MG tablet  carisoprodol (SOMA) 350 MG tablet  cyclobenzaprine (FLEXERIL) 10 MG tablet  diclofenac (VOLTAREN) 1 % topical gel  diphenhydrAMINE (BENADRYL) 50 MG capsule  DULoxetine (CYMBALTA) 60 MG EC capsule  EPINEPHrine (ANY BX GENERIC EQUIV) 0.3 MG/0.3ML injection 2-pack  fexofenadine (ALLEGRA) 180 MG tablet  fluticasone (FLONASE) 50 MCG/ACT nasal spray  furosemide (LASIX) 20 MG tablet  gabapentin (NEURONTIN) 300 MG capsule  hydrochlorothiazide (HYDRODIURIL) 12.5 MG tablet  HYDROcodone-acetaminophen (NORCO) 5-325 MG tablet  lidocaine (XYLOCAINE) 2 % solution  losartan (COZAAR) 100 MG tablet  medical cannabis (Patient's own supply)  medroxyPROGESTERone (DEPO-PROVERA) 150 MG/ML IM injection  methocarbamol (ROBAXIN) 750 MG tablet  NONFORMULARY  nystatin (MYCOSTATIN) 016529 UNIT/ML suspension  ondansetron (ZOFRAN-ODT) 8 MG ODT tab  oxyCODONE-acetaminophen (PERCOCET) 5-325 MG tablet  pantoprazole (PROTONIX) 40 MG EC tablet  polyethylene glycol (MIRALAX) 17 g packet  prazosin (MINIPRESS) 1 MG capsule  prochlorperazine (COMPAZINE) 10 MG tablet  rizatriptan (MAXALT-MLT) 5 MG ODT  sucralfate (CARAFATE) 1 GM tablet  valACYclovir (VALTREX) 1000 mg tablet          Review of Systems   All other systems  "reviewed and are negative.      Physical Exam   BP: (!) 139/94  Pulse: 91  Temp: 98.2  F (36.8  C)  Resp: 16  SpO2: 96 %      Physical Exam  Vitals signs and nursing note reviewed.   Constitutional:       General: She is not in acute distress.     Appearance: Normal appearance. She is not ill-appearing.   HENT:      Head: Normocephalic.      Nose: Nose normal.   Neck:      Musculoskeletal: Normal range of motion.   Cardiovascular:      Rate and Rhythm: Normal rate and regular rhythm.      Pulses: Normal pulses.      Heart sounds: No murmur. No friction rub.   Pulmonary:      Effort: Pulmonary effort is normal.   Musculoskeletal: Normal range of motion.   Skin:     General: Skin is warm and dry.   Neurological:      Mental Status: She is alert.         ED Course        Procedures      No results found. However, due to the size of the patient record, not all encounters were searched. Please check Results Review for a complete set of results.    Medications - No data to display     Patient immediately asked for something for pain.  I told her that our policy is not to treat chronic pain with narcotics and I offered her some nonnarcotic options.  She then immediately developed a flat affect and refused to look at me.  She then told her mom to \"shut up\".  I then asked her does she still want the x-ray and she did indicate yes.  I asked her again are you sure you do not want any nonnarcotic options.  She then states \"this is bullshit\".  I then said I am sorry feel that way and I left the room.  As I was leaving she yelled out \"Fuck you\".      Reviewing her chart, patient was just seen at the [facility] 3 days ago with similar complaints.  Apparently the patient was very rude and cussing at staff at that time also because they refused to give her narcotics also.    Apparently while waiting for x-ray to come to do an x-ray of her back, she apparently walked out of the department.  She walked on her own, with no " obvious limp and got into the car with her mom and left.      Assessments & Plan (with Medical Decision Making)  Chronic back pain     I have reviewed the nursing notes.    I have reviewed the findings, diagnosis, plan and need for follow up with the patient.              9/12/2020   [facility] EMERGENCY DEPARTMENT     Oswaldo Zamora MD  09/12/20 [id number]

## 2020-09-12 NOTE — ED TRIAGE NOTES
Presents to ED with back pain and numbness radiating to the legs. Had a neurostimulator put in on 09/08/2020. Wants to make sure leads are still in place. Took oxycodone at 1000 and Robaxin at 1300. Patient would like mother to interpret for her and refusal form signed. Patient's airway, breathing, circulation, and disability/mental status (ABCDs) intact/WDL during triage.

## 2020-09-12 NOTE — ED NOTES
"Patient signed declination for  and Kicked her Mom out of the room, saying \"Fuck you.\"  Provider aware and patient willing to have xray.  "

## 2020-09-14 ENCOUNTER — PATIENT OUTREACH (OUTPATIENT)
Dept: NURSING | Facility: CLINIC | Age: 30
End: 2020-09-14
Payer: MEDICARE

## 2020-09-14 NOTE — PROGRESS NOTES
Clinic Care Coordination Contact  Community Health Worker Initial Outreach       CHW Additional Questions  If ED/Hospital discharge, follow-up appointment scheduled as recommended?: N/A  Medication changes made following ED/Hospital discharge?: No  MyChart active?: Yes  Patient sent Social Determinants of Health questionnaire?: No    Patient accepts CC: No, [facility] declined CCC. Patient will be sent Care Coordination introduction letter for future reference.     Elena STEVEN Community Health Worker  Clinic Care Coordination  [facility] : [facility]  Phone: [phone]      Referral made off of discharge report.

## 2020-09-17 NOTE — PROGRESS NOTES
Subjective     Katy Islas is a 30 year old female who presents to clinic today for the following health issues:    HPI     Set up appointments with GYN - Dr. Berumen and her GI provider Charissa in [place]       Musculoskeletal problem/pain - bilateral foot pain   Onset/Duration: a long time   Description  Location: foot - bilateral  Joint Swelling: YES  Redness: no  Pain: YES  Warmth: no  Intensity:  moderate  Progression of Symptoms:  Same- new shoes  Accompanying signs and symptoms:   Fevers: no  Numbness/tingling/weakness: YES  History  Trauma to the area: no  Recent illness:  no  Previous similar problem: YES  Previous evaluation:  no  Precipitating or alleviating factors:  Aggravating factors include: walking  Therapies tried and outcome: acetaminophen, new shoes - both help some     - Mom thinks has plantar fasciitis      If walks around, feet sore      New shoes help      Right arch hurts and heel     Left not as bad, but in arch     Heels hurt   - Weight gain due to medical issue   - Big toes have arthritis     - Spinal cord simulator placed, has recheck Oct 7     Doing physical therapy     - unable to schedule GI, patient will call  - ob scheduled     - Mood     Was supposed to see 9/15 but they never called     No luck     Maybe go back to [facility]?      Sad and angry all the time           Review of Systems   Constitutional, HEENT, cardiovascular, pulmonary, gi and gu systems are negative, except as otherwise noted.      Objective    /82   Pulse 112   Temp 99  F (37.2  C) (Temporal)   Resp 18   Wt 88 kg (194 lb)   SpO2 98%   BMI 36.66 kg/m    Body mass index is 36.66 kg/m .  Physical Exam   GENERAL APPEARANCE: healthy, alert and no distress  EYES: Eyes grossly normal to inspection, PERRLA, conjunctivae and sclerae without injection or discharge, EOM intact   RESP: Lungs clear to auscultation - no rales, rhonchi or wheezes    CV: Regular rates and rhythm, normal S1 S2, no S3 or S4, no  murmur, click or rub, no peripheral edema and peripheral pulses strong and symmetric bilaterally   MS:   Bilateral feet - pes planus, tender arch and into heel, no edema, normal pulses and sensation   No musculoskeletal defects are noted and gait is age appropriate without ataxia   SKIN: No suspicious lesions or rashes, hydration status appears adeuqate with normal skin turgor   PSYCH: Alert and oriented x3; speech- coherent , normal rate and volume; able to articulate logical thoughts, able to abstract reason, no tangential thoughts, no hallucinations or delusions, mentation appears normal, Mood is euthymic. Affect is appropriate for this mood state and bright. Thought content is free of suicidal ideation, hallucinations, and delusions. Dress is adequate and upkept. Eye contact is good during conversation.       Diagnostics   None         Assessment & Plan     ICD-10-CM    1. Plantar fasciitis  M72.2    2. Mild major depression (H)  F32.0 MENTAL HEALTH REFERRAL  - Adult; Psychiatry; Psychiatry; Other: [facility] [phone]; We will contact you to schedule the appointment or please call with any questions     buPROPion (WELLBUTRIN XL) 300 MG 24 hr tablet     clonazePAM (KLONOPIN) 0.5 MG ODT   3. Anxiety  F41.9 MENTAL HEALTH REFERRAL  - Adult; Psychiatry; Psychiatry; Other: [facility] [phone]; We will contact you to schedule the appointment or please call with any questions     clonazePAM (KLONOPIN) 0.5 MG ODT   4. Bipolar disorder, current episode mixed, severe, without psychotic features (H)  F31.63 MENTAL HEALTH REFERRAL  - Adult; Psychiatry; Psychiatry; Other: [facility] [phone]; We will contact you to schedule the appointment or please call with any questions     buPROPion (WELLBUTRIN XL) 300 MG 24 hr tablet   5. Crohn's disease of colon with fistula (H)  K50.113 dicyclomine (BENTYL) 20 MG tablet   6. Adjustment disorder with mixed anxiety and depressed mood  F43.23  "buPROPion (WELLBUTRIN XL) 300 MG 24 hr tablet   7. Need for prophylactic vaccination and inoculation against influenza  Z23 INFLUENZA VACCINE IM > 6 MONTHS VALENT IIV4 [11662]     ADMIN INFLUENZA (For MEDICARE Patients ONLY) []   8. Mild persistent asthma without complication  J45.30 albuterol (PROAIR HFA/PROVENTIL HFA/VENTOLIN HFA) 108 (90 Base) MCG/ACT inhaler     - Discussed again over-utilzation of healthcare     Discussed ways to deal with her GI issues, reassured that hemoglobin despite reported bleeding, has been maintained   - Again, every 2 week appointments, we will do one in office and one over phone   - Team assisted in getting her into see her GYN provider and GI provider     - Discussed she needs to call for psychiatry and return to [facility]      Of note doing very well with her back pain after spinal cord stimulator placement     - Mood      Continues weekly counseling, was supposed to see psychiatry but something happened with that appointment (stated \"they never called me\" and I told her again needs to take control of her health and should have called the)      Discussed I do not want to continue to manage her mental health, she needs a specialist      For now, on Prazosin, Wellbutrin, & Cymbalta      Will increase Wellbutrin to 150 mg      Patient asking for refill of Klonopin,  reviewed, last fill was June      She is off all pain medications and is no longer on Suboxone      Discussed will give her small amount to use as needed      Reviewed use and side effects including sedation, no driving on this medication      Will not refill unless she has scheduled with psychiatry    - Foot pain     Consistent with plantar fasciitis, likely due to weight gain and lack of exercise      Discussed conservative treatment at length, hand outs given      If worsens, will refer to podiatry     - Asthma      Stable, reviewed medication use and side effects     - Flu shot given, nothing else needed     Due " to language barrier, an  was present via Ipad video conference during the history-taking and subsequent discussion (and for part of the physical exam) with this patient.    The patient indicates understanding of these issues and agrees with the plan.    Return in about 2 weeks (around 10/6/2020).    Aura Rosario PA-C  [facility]

## 2020-09-20 ENCOUNTER — TELEPHONE (OUTPATIENT)
Dept: FAMILY MEDICINE | Facility: OTHER | Age: 30
End: 2020-09-20

## 2020-09-20 NOTE — TELEPHONE ENCOUNTER
Please call patient     She is on my schedule for Tuesday for referral for hysterectomy. She doesn't need this. She needs to move this appointment to Dr. Berumen, as she wanted to see her prior to scheduling surgery. Then once surgery is scheduled, she needs a pre-op.     She can just come for a nurse only flu shot.     If she wants to have a visit with me, she needs to be moved to her normal spot - Wednesdays 10 am for a PHONE visit (but we canceled the one scheduled for this week because she stated didn't need it)    Zach Rosario PA-C  [facility]

## 2020-09-21 ENCOUNTER — MYC MEDICAL ADVICE (OUTPATIENT)
Dept: FAMILY MEDICINE | Facility: OTHER | Age: 30
End: 2020-09-21

## 2020-09-21 ENCOUNTER — APPOINTMENT (OUTPATIENT)
Dept: CT IMAGING | Facility: CLINIC | Age: 30
End: 2020-09-21
Attending: INTERNAL MEDICINE
Payer: MEDICARE

## 2020-09-21 ENCOUNTER — HOSPITAL ENCOUNTER (EMERGENCY)
Facility: CLINIC | Age: 30
Discharge: HOME | End: 2020-09-21
Attending: INTERNAL MEDICINE | Admitting: INTERNAL MEDICINE
Payer: MEDICARE

## 2020-09-21 ENCOUNTER — TELEPHONE (OUTPATIENT)
Dept: FAMILY MEDICINE | Facility: OTHER | Age: 30
End: 2020-09-21

## 2020-09-21 VITALS
OXYGEN SATURATION: 96 % | RESPIRATION RATE: 16 BRPM | BODY MASS INDEX: 36.37 KG/M2 | WEIGHT: 192.5 LBS | HEART RATE: 98 BPM | SYSTOLIC BLOOD PRESSURE: 142 MMHG | TEMPERATURE: 98.5 F | DIASTOLIC BLOOD PRESSURE: 90 MMHG

## 2020-09-21 DIAGNOSIS — I89.0 LYMPHEDEMA: ICD-10-CM

## 2020-09-21 DIAGNOSIS — R10.84 ABDOMINAL PAIN, GENERALIZED: ICD-10-CM

## 2020-09-21 LAB
ALBUMIN SERPL-MCNC: 3.8 G/DL (ref 3.4–5)
ALBUMIN UR-MCNC: NEGATIVE MG/DL
ALP SERPL-CCNC: 112 U/L (ref 40–150)
ALT SERPL W P-5'-P-CCNC: 47 U/L (ref 0–50)
ANION GAP SERPL CALCULATED.3IONS-SCNC: 4 MMOL/L (ref 3–14)
APPEARANCE UR: CLEAR
AST SERPL W P-5'-P-CCNC: 29 U/L (ref 0–45)
BACTERIA #/AREA URNS HPF: ABNORMAL /HPF
BASOPHILS # BLD AUTO: 0 10E9/L (ref 0–0.2)
BASOPHILS NFR BLD AUTO: 0.6 %
BILIRUB SERPL-MCNC: 0.5 MG/DL (ref 0.2–1.3)
BILIRUB UR QL STRIP: NEGATIVE
BUN SERPL-MCNC: 15 MG/DL (ref 7–30)
CALCIUM SERPL-MCNC: 9.4 MG/DL (ref 8.5–10.1)
CHLORIDE SERPL-SCNC: 106 MMOL/L (ref 94–109)
CO2 SERPL-SCNC: 26 MMOL/L (ref 20–32)
COLOR UR AUTO: YELLOW
CREAT SERPL-MCNC: 0.83 MG/DL (ref 0.52–1.04)
DIFFERENTIAL METHOD BLD: ABNORMAL
EOSINOPHIL # BLD AUTO: 0.1 10E9/L (ref 0–0.7)
EOSINOPHIL NFR BLD AUTO: 2 %
ERYTHROCYTE [DISTWIDTH] IN BLOOD BY AUTOMATED COUNT: 11.9 % (ref 10–15)
GFR SERPL CREATININE-BSD FRML MDRD: >90 ML/MIN/{1.73_M2}
GLUCOSE SERPL-MCNC: 79 MG/DL (ref 70–99)
GLUCOSE UR STRIP-MCNC: NEGATIVE MG/DL
HCG UR QL: NEGATIVE
HCT VFR BLD AUTO: 40.9 % (ref 35–47)
HGB BLD-MCNC: 14.1 G/DL (ref 11.7–15.7)
HGB UR QL STRIP: NEGATIVE
IMM GRANULOCYTES # BLD: 0 10E9/L (ref 0–0.4)
IMM GRANULOCYTES NFR BLD: 0.2 %
KETONES UR STRIP-MCNC: NEGATIVE MG/DL
LEUKOCYTE ESTERASE UR QL STRIP: NEGATIVE
LYMPHOCYTES # BLD AUTO: 1.8 10E9/L (ref 0.8–5.3)
LYMPHOCYTES NFR BLD AUTO: 34.7 %
MCH RBC QN AUTO: 33.4 PG (ref 26.5–33)
MCHC RBC AUTO-ENTMCNC: 34.5 G/DL (ref 31.5–36.5)
MCV RBC AUTO: 97 FL (ref 78–100)
MONOCYTES # BLD AUTO: 0.5 10E9/L (ref 0–1.3)
MONOCYTES NFR BLD AUTO: 9.7 %
MUCOUS THREADS #/AREA URNS LPF: PRESENT /LPF
NEUTROPHILS # BLD AUTO: 2.7 10E9/L (ref 1.6–8.3)
NEUTROPHILS NFR BLD AUTO: 52.8 %
NITRATE UR QL: NEGATIVE
NRBC # BLD AUTO: 0 10*3/UL
NRBC BLD AUTO-RTO: 0 /100
PH UR STRIP: 6 PH (ref 5–7)
PLATELET # BLD AUTO: 287 10E9/L (ref 150–450)
POTASSIUM SERPL-SCNC: 4.5 MMOL/L (ref 3.4–5.3)
PROT SERPL-MCNC: 7.8 G/DL (ref 6.8–8.8)
RBC # BLD AUTO: 4.22 10E12/L (ref 3.8–5.2)
RBC #/AREA URNS AUTO: 0 /HPF (ref 0–2)
SODIUM SERPL-SCNC: 136 MMOL/L (ref 133–144)
SOURCE: ABNORMAL
SP GR UR STRIP: 1.02 (ref 1–1.03)
SQUAMOUS #/AREA URNS AUTO: 1 /HPF (ref 0–1)
UROBILINOGEN UR STRIP-MCNC: NORMAL MG/DL (ref 0–2)
WBC # BLD AUTO: 5.1 10E9/L (ref 4–11)
WBC #/AREA URNS AUTO: 1 /HPF (ref 0–5)

## 2020-09-21 PROCEDURE — 81025 URINE PREGNANCY TEST: CPT | Performed by: INTERNAL MEDICINE

## 2020-09-21 PROCEDURE — 96376 TX/PRO/DX INJ SAME DRUG ADON: CPT | Performed by: INTERNAL MEDICINE

## 2020-09-21 PROCEDURE — 81001 URINALYSIS AUTO W/SCOPE: CPT | Performed by: EMERGENCY MEDICINE

## 2020-09-21 PROCEDURE — 85025 COMPLETE CBC W/AUTO DIFF WBC: CPT | Performed by: EMERGENCY MEDICINE

## 2020-09-21 PROCEDURE — 99284 EMERGENCY DEPT VISIT MOD MDM: CPT | Mod: Z6 | Performed by: INTERNAL MEDICINE

## 2020-09-21 PROCEDURE — 40000556 ZZH STATISTIC PERIPHERAL IV START W US GUIDANCE

## 2020-09-21 PROCEDURE — 99285 EMERGENCY DEPT VISIT HI MDM: CPT | Mod: 25 | Performed by: INTERNAL MEDICINE

## 2020-09-21 PROCEDURE — 25000128 H RX IP 250 OP 636: Performed by: EMERGENCY MEDICINE

## 2020-09-21 PROCEDURE — 96375 TX/PRO/DX INJ NEW DRUG ADDON: CPT | Performed by: INTERNAL MEDICINE

## 2020-09-21 PROCEDURE — 25000128 H RX IP 250 OP 636: Performed by: INTERNAL MEDICINE

## 2020-09-21 PROCEDURE — 74176 CT ABD & PELVIS W/O CONTRAST: CPT

## 2020-09-21 PROCEDURE — 96374 THER/PROPH/DIAG INJ IV PUSH: CPT | Performed by: INTERNAL MEDICINE

## 2020-09-21 PROCEDURE — 80053 COMPREHEN METABOLIC PANEL: CPT | Performed by: EMERGENCY MEDICINE

## 2020-09-21 RX ORDER — DIPHENHYDRAMINE HYDROCHLORIDE 50 MG/ML
25 INJECTION INTRAMUSCULAR; INTRAVENOUS ONCE
Status: COMPLETED | OUTPATIENT
Start: 2020-09-21 | End: 2020-09-21

## 2020-09-21 RX ORDER — HYDROMORPHONE HYDROCHLORIDE 1 MG/ML
0.5 INJECTION, SOLUTION INTRAMUSCULAR; INTRAVENOUS; SUBCUTANEOUS ONCE
Status: COMPLETED | OUTPATIENT
Start: 2020-09-21 | End: 2020-09-21

## 2020-09-21 RX ORDER — ONDANSETRON 2 MG/ML
4 INJECTION INTRAMUSCULAR; INTRAVENOUS EVERY 30 MIN PRN
Status: DISCONTINUED | OUTPATIENT
Start: 2020-09-21 | End: 2020-09-21 | Stop reason: HOSPADM

## 2020-09-21 RX ORDER — ONDANSETRON 2 MG/ML
4 INJECTION INTRAMUSCULAR; INTRAVENOUS ONCE
Status: COMPLETED | OUTPATIENT
Start: 2020-09-21 | End: 2020-09-21

## 2020-09-21 RX ADMIN — ONDANSETRON 4 MG: 2 INJECTION INTRAMUSCULAR; INTRAVENOUS at 15:00

## 2020-09-21 RX ADMIN — ONDANSETRON 4 MG: 2 INJECTION INTRAMUSCULAR; INTRAVENOUS at 16:21

## 2020-09-21 RX ADMIN — HYDROMORPHONE HYDROCHLORIDE 1 MG: 1 INJECTION, SOLUTION INTRAMUSCULAR; INTRAVENOUS; SUBCUTANEOUS at 16:42

## 2020-09-21 RX ADMIN — DIPHENHYDRAMINE HYDROCHLORIDE 25 MG: 50 INJECTION, SOLUTION INTRAMUSCULAR; INTRAVENOUS at 15:42

## 2020-09-21 RX ADMIN — HYDROMORPHONE HYDROCHLORIDE 0.5 MG: 1 INJECTION, SOLUTION INTRAMUSCULAR; INTRAVENOUS; SUBCUTANEOUS at 15:42

## 2020-09-21 ASSESSMENT — ENCOUNTER SYMPTOMS
DIARRHEA: 0
ANAL BLEEDING: 1
NAUSEA: 1
VOMITING: 1
FEVER: 0
BLOOD IN STOOL: 1
ABDOMINAL PAIN: 1

## 2020-09-21 NOTE — TELEPHONE ENCOUNTER
[place] message sent regarding appt for tomorrow and to schedule with OB/GYN    Michelle Avila RN

## 2020-09-21 NOTE — ED TRIAGE NOTES
Patient presents ambulatory to triage with c/o abdominal pain and rectal bleeding. Patient reports abdominal pain and bright red blood per rectum for the past two weeks. Patient reports blood when she wipes and in the toilet every time she has had bowel movements in the past couple weeks. Hx of Crohn's.

## 2020-09-21 NOTE — ED AVS SNAPSHOT
[facility], [place], Emergency Department  [address] [address]  Phone:  [phone]                                    Katy Islas   MRN: [id number]    Department:  [facility], Emergency Department   Date of Visit:  9/21/2020           After Visit Summary Signature Page    I have received my discharge instructions, and my questions have been answered. I have discussed any challenges I see with this plan with the nurse or doctor.    ..........................................................................................................................................  Patient/Patient Representative Signature      ..........................................................................................................................................  Patient Representative Print Name and Relationship to Patient    ..................................................               ................................................  Date                                   Time    ..........................................................................................................................................  Reviewed by Signature/Title    ...................................................              ..............................................  Date                                               Time          22EPIC Rev 08/18

## 2020-09-21 NOTE — TELEPHONE ENCOUNTER
Per chart patient is currently in the ED at [facility]. Will keep flagged RN to reach out at a later time to address appt scheduled for tomorrow. See below note    Michelle Avila RN

## 2020-09-21 NOTE — ED PROVIDER NOTES
ED Provider Note  [facility]      History     Chief Complaint   Patient presents with     Abdominal Pain     Rectal Bleeding     HPI  Katy Islas is a 30 year old female with a past medical history significant for Crohn's disease, conversion disorder, deafness, and drug seeking behavior who presents here to the Emergency Department due to abdominal pain and rectal bleeding.  She noted that she cant remember who her gastroenterologist is in response to questions about why she has not gone to see them for these symptoms.  Per note from today, her primary provider recommended that she see her gastroenterologist.  The patient notes that she is not on any Crohn's medications because she cannot afford them.  There are multiple care coordination notes in the EMR, last one from September 14, 2020.  The last one says that patient declines clinic care coordination.  The patient denies this and would like help in this regard.    Patient was seen on 9/19 at [place] [facility] for nausea, vomiting, and abdominal pain.  She noted rectal bleeding that was secondary to her Crohn's. Prior to her labs returning the patient eloped from the emergency department. This is after she was offered a GI cocktail and Tylenol.    Today in the ED, patient reports abdominal pain and bright red blood per rectum for the past two weeks.  She reports blood when she wipes as well as in the toilet every time she has a bowel movement.  Stools are slightly soft to relatively well formed.  3 times a day on average.  Does not wake her at night.  She denies urinary symptoms, fever, abnormal vaginal discharge.  She notes nausea and infrequent bilious vomiting without blood.    Past Medical History  Past Medical History:   Diagnosis Date     Abdominal pain 10/31- 11/4/2005    [facility] admit for Crohn's     Allergic rhinitis, cause unspecified     Allergic rhinitis     Arthritis      C. difficile diarrhea     Past, no  current diarrhea.     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, without mention of intractable epilepsy     pseudoseizures diagnosed after extensive neurologic eval     Migraine 07/21/12    D/C 07/22/12-[facility]     Migraine, unspecified, without mention of intractable migraine without mention of status migrainosus     Migraine     Mild intermittent asthma     mild intermittent     Mycoplasma infection in conditions classified elsewhere and of unspecified site      Other chronic pain     Back pain for 6 years     Renal disease     Kidney stones     Unspecified hearing loss     congenital hearing loss     Past Surgical History:   Procedure Laterality Date     AS REMOVAL OF COCCYX  10/18/2017     C FUSION OF SACROILIAC JOINT  10/26/2018     COLONOSCOPY  7/1/2009    [facility] [place].     COLONOSCOPY N/A 7/17/2019    Procedure: Colonoscopy, With Polypectomy And Biopsy;  Surgeon: Edwin Conde MD;  Location: [facility] OR     COLONOSCOPY WITH CO2 INSUFFLATION N/A 7/17/2019    Procedure: COLONOSCOPY, WITH CO2 INSUFFLATION;  Surgeon: Edwin Conde MD;  Location: [facility] OR     COMBINED ESOPHAGOSCOPY, GASTROSCOPY, DUODENOSCOPY (EGD) WITH CO2 INSUFFLATION N/A 4/12/2019    Procedure: COMBINED ESOPHAGOSCOPY, GASTROSCOPY, DUODENOSCOPY (EGD) WITH CO2 INSUFFLATION;  Surgeon: Edwin Conde MD;  Location: [facility] OR     ESOPHAGOSCOPY, GASTROSCOPY, DUODENOSCOPY (EGD), COMBINED N/A 4/12/2019    Procedure: Combined Esophagoscopy,  Gastroscopy, Duodenoscopy (Egd), Biopsy Single Or Multiple;  Surgeon: Edwin Conde MD;  Location: [facility] OR     FISTULOTOMY RECTUM N/A 4/30/2020    Procedure: EXAM UNDER ANESTHESIA, ANUS, parital fistulotomy;  Surgeon: Valentin Sanchez MD;  Location: [facility] OR     FUSION SPINE POSTERIOR MINIMALLY INVASIVE ONE LEVEL N/A 2/23/2017    Procedure: FUSION SPINE POSTERIOR MINIMALLY INVASIVE ONE LEVEL;  L4-5 Oblique Lateral Lumbar Interbody Fusion   Epidural steroid injection.   Transpedicular Bone marrow aspiration;  Surgeon: Jeniffer Eugene MD;  Location: [facility] OR     HC COLONOSCOPY THRU STOMA WITH BIOPSY  10/29/2003    Impression is that of normal appearing colonoscopy, without evidence of rectal bleeding.     HC COLONOSCOPY THRU STOMA, DIAGNOSTIC  10/00    normal     HC COLONOSCOPY THRU STOMA, DIAGNOSTIC  Oct 2009    Dr López- normal     HC EEG AWAKE AND SLEEP      abnormal     HC MRI BRAIN W/O CONTRAST  12/00    normal     HC REMOVAL GALLBLADDER  8/5/2009    [facility].     HC UGI ENDOSCOPY DIAG W BIOPSY  11/11/09    Normal esophagus     HC UGI ENDOSCOPY, SIMPLE EXAM  7/00, 10/00    mild chronic esophagitis and duodenitis, neg H pylori     HC UGI ENDOSCOPY, SIMPLE EXAM  01/20/2005    Esophagogastroduodenoscopy, colonoscopy with biopsies.  [facility] UGI ENDOSCOPY, SIMPLE EXAM  7/1/2009    [facility], [facility].      UGI ENDOSCOPY, SIMPLE EXAM  11/11/2009    attempted upper GI, pt. could not tolerate procedure:[facility]     ORTHOPEDIC SURGERY  October 19,2011    diskectomy L4-L5     acetaminophen (TYLENOL) 500 MG tablet  albuterol (PROAIR HFA/PROVENTIL HFA/VENTOLIN HFA) 108 (90 Base) MCG/ACT inhaler  albuterol (PROVENTIL) (2.5 MG/3ML) 0.083% neb solution  alum & mag hydroxide-simethicone (MAALOX ADVANCED MAX ST) 400-400-40 MG/5ML SUSP suspension  ARIPiprazole (ABILIFY) 30 MG tablet  aspirin (ASA) 81 MG tablet  azelastine (OPTIVAR) 0.05 %  ophthalmic solution  buprenorphine (BUTRANS) 10 MCG/HR WK patch  Buprenorphine HCl (BELBUCA) 300 MCG FILM buccal film  buPROPion (WELLBUTRIN XL) 150 MG 24 hr tablet  busPIRone (BUSPAR) 15 MG tablet  carisoprodol (SOMA) 350 MG tablet  cyclobenzaprine (FLEXERIL) 10 MG tablet  diclofenac (VOLTAREN) 1 % topical gel  diphenhydrAMINE (BENADRYL) 50 MG capsule  DULoxetine (CYMBALTA) 60 MG EC capsule  EPINEPHrine (ANY BX GENERIC EQUIV) 0.3 MG/0.3ML injection 2-pack  fexofenadine (ALLEGRA) 180 MG tablet  fluticasone (FLONASE) 50 MCG/ACT nasal spray  furosemide (LASIX) 20 MG tablet  gabapentin (NEURONTIN) 300 MG capsule  hydrochlorothiazide (HYDRODIURIL) 12.5 MG tablet  HYDROcodone-acetaminophen (NORCO) 5-325 MG tablet  lidocaine (XYLOCAINE) 2 % solution  losartan (COZAAR) 100 MG tablet  medical cannabis (Patient's own supply)  medroxyPROGESTERone (DEPO-PROVERA) 150 MG/ML IM injection  methocarbamol (ROBAXIN) 750 MG tablet  NONFORMULARY  nystatin (MYCOSTATIN) 513068 UNIT/ML suspension  ondansetron (ZOFRAN-ODT) 8 MG ODT tab  oxyCODONE-acetaminophen (PERCOCET) 5-325 MG tablet  pantoprazole (PROTONIX) 40 MG EC tablet  polyethylene glycol (MIRALAX) 17 g packet  prazosin (MINIPRESS) 1 MG capsule  prochlorperazine (COMPAZINE) 10 MG tablet  rizatriptan (MAXALT-MLT) 5 MG ODT  sucralfate (CARAFATE) 1 GM tablet  valACYclovir (VALTREX) 1000 mg tablet      Allergies   Allergen Reactions     Iohexol Hives     Other reaction(s): Hives  IV contrast; patient states she tolerates contrast if she gets benadryl before  IV contrast; patient states she tolerates contrast if she gets benadryl before     Ativan [Lorazepam] Hives     At the IV site     Baclofen      hives     Bees Hives, Swelling and Difficulty breathing     Caffeine      Contrast Dye      Hives,   Updated 5/10/2016 CT Contrast.     Dilaudid [Hydromorphone] Itching     Iodine Hives     Methocarbamol Swelling     Metoclopramide      Other reaction(s): Tremors  LW Reaction:  shaking/sweating     Midazolam      Other reaction(s): Agitation     Monosodium Glutamate      Morphine Other (See Comments)     Difficulty with urination     Nsaids Other (See Comments)     GI BLEED x2     Other (Do Not Use) Other (See Comments)     Xanaflex- pt becomes disoriented and loses bladder control     Reglan [Metoclopramide Hcl] Other (See Comments)     shaking     Soma [Carisoprodol] Visual Disturbance     Sleep walking     Tizanidine      Topamax Other (See Comments)     Topamax [Topiramate] Nausea     Tingling  GI/Vomit     Tramadol      Severe Headache, Seizure Risk     Tylenol W/Codeine [Acetaminophen-Codeine] Nausea and Itching     Tylenol 3     Versed Other (See Comments)     Zolmitriptan      Makes face feel like its twitching     Droperidol Anxiety     Flu Virus Vaccine Rash      Arm swelling     Family History  Family History   Problem Relation Age of Onset     Gastrointestinal Disease Brother         severe Crohn's     Neurologic Disorder Brother         Seizures post head injury     Depression Brother      Substance Abuse Brother      Genitourinary Problems Father         kidney stones     Diabetes Father      Heart Disease Father         Open heart surgery     Breast Cancer Maternal Grandmother      Parkinsonism Maternal Grandmother      Cerebrovascular Disease Paternal Grandmother      Cancer Maternal Grandfather         Lung     Cardiovascular Paternal Grandfather         Heart Attack     Substance Abuse Mother         in recovery x1 year      Lung Cancer Paternal Uncle      Cancer Paternal Uncle      Lung Cancer Maternal Uncle      Colon Cancer Maternal Uncle      Social History   Social History     Tobacco Use     Smoking status: Current Every Day Smoker     Packs/day: 0.25     Years: 5.00     Pack years: 1.25     Types: Cigarettes     Smokeless tobacco: Never Used   Substance Use Topics     Alcohol use: Not Currently     Comment: Not since last July 2018     Drug use: Yes     Types:  Marijuana     Comment: Medical Marijuana currently-- More CBD      Past medical history, past surgical history, medications, allergies, family history, and social history were reviewed with the patient. No additional pertinent items.       Review of Systems   Constitutional: Negative for fever.   Gastrointestinal: Positive for abdominal pain, anal bleeding, blood in stool, nausea and vomiting. Negative for diarrhea.     A complete review of systems was performed with pertinent positives and negatives noted in the HPI, and all other systems negative.    Physical Exam   BP: 135/87  Pulse: 89  Temp: 98.5  F (36.9  C)  Resp: 16  Weight: 87.3 kg (192 lb 8 oz)(standing scale)  SpO2: 96 %  Physical Exam  Vitals signs and nursing note reviewed.   Constitutional:       General: She is not in acute distress.     Appearance: She is not ill-appearing, toxic-appearing or diaphoretic.   HENT:      Head: Atraumatic.      Mouth/Throat:      Mouth: Mucous membranes are moist.   Eyes:      General: No scleral icterus.     Extraocular Movements: Extraocular movements intact.      Pupils: Pupils are equal, round, and reactive to light.   Neck:      Musculoskeletal: No muscular tenderness.   Cardiovascular:      Rate and Rhythm: Normal rate and regular rhythm.   Pulmonary:      Effort: Pulmonary effort is normal. No respiratory distress.      Breath sounds: Normal breath sounds.   Abdominal:      General: There is no distension.      Palpations: Abdomen is soft.      Tenderness: There is abdominal tenderness. There is no right CVA tenderness, left CVA tenderness, guarding or rebound.      Comments: Slight diffuse tenderness without peritoneal signs.   Musculoskeletal:         General: No swelling or tenderness.   Skin:     General: Skin is warm and dry.   Neurological:      General: No focal deficit present.      Mental Status: She is alert and oriented to person, place, and time.      Cranial Nerves: No cranial nerve deficit.       Sensory: No sensory deficit.      Motor: No weakness.      Coordination: Coordination normal.   Psychiatric:         Mood and Affect: Mood normal.         ED Course      Procedures         Results for orders placed or performed during the hospital encounter of 09/21/20   CT Abdomen Pelvis w/o Contrast     Status: None    Narrative    EXAMINATION: CT ABDOMEN PELVIS W/O CONTRAST, 9/21/2020 4:33 PM    TECHNIQUE:  Helical CT images from the lung bases through the  symphysis pubis were obtained without contrast.  Coronal reformatted  images were generated at a workstation for further assessment.    COMPARISON: None.    HISTORY: Abd pain, acute, generalized; h/o crohns now with abdominal  pain. exam benign    FINDINGS:    Abdomen and pelvis:   Liver: The visualized portions of the liver are unremarkable. No  intrahepatic or extrahepatic biliary ductal dilatation.  Gallbladder: Cholecystectomy.  Spleen: Normal size.  Pancreas: Mild to moderate fatty deposition of the pancreatic head and  neck.  The pancreatic duct is not dilated.  Adrenal glands: No adrenal nodules.  Urinary system: Subtle 5 mm hyperdensity within the inferior pole of  the right kidney best visualized on series 3, image 70) this is too  small to characterize however is favored to represent a benign  proteinaceous or hemorrhagic cyst and is unchanged compared to prior.  Few scattered 2 mm nonobstructing renal stones for example posterior  right interpolar nonobstructing stone (series 5, image 98). Few  scattered 1-2 mm nonobstructing renal stones within the left kidney  for example posterior interpolar 2 mm nonobstructing stone (series 5,  image 119). No hydronephrosis, or hydroureter. Urinary bladder is  unremarkable.  Reproductive organs: Unremarkable.    Bowel: No abnormally dilated loops of large or small bowel. There is  thickening of the rectosigmoid with fatty infiltration of the wall but  no Christopher bowel inflammatory stranding. No free fluid is  present within  the pelvis.. The appendix is unremarkable.  Lymph nodes: Mildly prominent right lower quadrant lymph nodes just  anterior to the right psoas muscle measuring 4 mm (series 5, image  231). No enlarged retroperitoneal, mesenteric, or pelvic  lymphadenopathy.  Fluid: No free fluid within the abdomen.  Vessels: No infrarenal aortic aneurysm.     Lung bases: No consolidation or pleural effusion.    Bones and soft tissues posterior fusion and instrumentation at L4 and  L5 with posterior spinal stimulator entering at L1 to and coursing  superiorly outside of the field of view. Left-sided SI joint fusion  hardware. No evidence for hardware complication.    The thoracolumbar junction region neurostimulator is unchanged in  position. The generator projects in the soft tissues overlying the  right iliac crest posteriorly.      Impression    IMPRESSION:   1.  There is interval increase in the bowel wall thickening of the  rectosigmoid region. Direct visualization.  2.  The remainder of the large or small bowel there is normal. Few  prominent right lower quadrant lymph nodes unchanged and likely  reactive.  3.  Scattered nonobstructing renal stones within bilateral kidneys.    I have personally reviewed the examination and initial interpretation  and I agree with the findings.    LAKHWINDER BABB MD   CBC with platelets differential     Status: Abnormal   Result Value Ref Range    WBC 5.1 4.0 - 11.0 10e9/L    RBC Count 4.22 3.8 - 5.2 10e12/L    Hemoglobin 14.1 11.7 - 15.7 g/dL    Hematocrit 40.9 35.0 - 47.0 %    MCV 97 78 - 100 fl    MCH 33.4 (H) 26.5 - 33.0 pg    MCHC 34.5 31.5 - 36.5 g/dL    RDW 11.9 10.0 - 15.0 %    Platelet Count 287 150 - 450 10e9/L    Diff Method Automated Method     % Neutrophils 52.8 %    % Lymphocytes 34.7 %    % Monocytes 9.7 %    % Eosinophils 2.0 %    % Basophils 0.6 %    % Immature Granulocytes 0.2 %    Nucleated RBCs 0 0 /100    Absolute Neutrophil 2.7 1.6 - 8.3 10e9/L    Absolute  Lymphocytes 1.8 0.8 - 5.3 10e9/L    Absolute Monocytes 0.5 0.0 - 1.3 10e9/L    Absolute Eosinophils 0.1 0.0 - 0.7 10e9/L    Absolute Basophils 0.0 0.0 - 0.2 10e9/L    Abs Immature Granulocytes 0.0 0 - 0.4 10e9/L    Absolute Nucleated RBC 0.0    Comprehensive metabolic panel     Status: None   Result Value Ref Range    Sodium 136 133 - 144 mmol/L    Potassium 4.5 3.4 - 5.3 mmol/L    Chloride 106 94 - 109 mmol/L    Carbon Dioxide 26 20 - 32 mmol/L    Anion Gap 4 3 - 14 mmol/L    Glucose 79 70 - 99 mg/dL    Urea Nitrogen 15 7 - 30 mg/dL    Creatinine 0.83 0.52 - 1.04 mg/dL    GFR Estimate >90 >60 mL/min/[1.73_m2]    GFR Estimate If Black >90 >60 mL/min/[1.73_m2]    Calcium 9.4 8.5 - 10.1 mg/dL    Bilirubin Total 0.5 0.2 - 1.3 mg/dL    Albumin 3.8 3.4 - 5.0 g/dL    Protein Total 7.8 6.8 - 8.8 g/dL    Alkaline Phosphatase 112 40 - 150 U/L    ALT 47 0 - 50 U/L    AST 29 0 - 45 U/L   UA with Microscopic     Status: Abnormal   Result Value Ref Range    Color Urine Yellow     Appearance Urine Clear     Glucose Urine Negative NEG^Negative mg/dL    Bilirubin Urine Negative NEG^Negative    Ketones Urine Negative NEG^Negative mg/dL    Specific Gravity Urine 1.016 1.003 - 1.035    Blood Urine Negative NEG^Negative    pH Urine 6.0 5.0 - 7.0 pH    Protein Albumin Urine Negative NEG^Negative mg/dL    Urobilinogen mg/dL Normal 0.0 - 2.0 mg/dL    Nitrite Urine Negative NEG^Negative    Leukocyte Esterase Urine Negative NEG^Negative    Source Midstream Urine     WBC Urine 1 0 - 5 /HPF    RBC Urine 0 0 - 2 /HPF    Bacteria Urine Few (A) NEG^Negative /HPF    Squamous Epithelial /HPF Urine 1 0 - 1 /HPF    Mucous Urine Present (A) NEG^Negative /LPF   HCG qualitative urine     Status: None   Result Value Ref Range    HCG Qual Urine Negative NEG^Negative     Medications   diphenhydrAMINE (BENADRYL) injection 25 mg (25 mg Intravenous Given 9/21/20 1542)   ondansetron (ZOFRAN) injection 4 mg (4 mg Intravenous Given 9/21/20 1621)   HYDROmorphone  (PF) (DILAUDID) injection 0.5 mg (0.5 mg Intravenous Given 9/21/20 1542)   HYDROmorphone (DILAUDID) injection 1 mg (1 mg Intravenous Given 9/21/20 1642)        Assessments & Plan (with Medical Decision Making)   Well-appearing patient with a history of Crohn's disease, untreated, noncompliant patient with regards to seeing her doctors, history of drug-seeking behavior, presents with 2 weeks of rectal bleeding and abdominal pain.  Throughout ED stay she is alert and interactive.  She is hemodynamically stable, in no acute distress, without increased work of breathing.  Afebrile and nontoxic appearing.  Serial abdominal exams done by me are benign.  Labs noncontributory.  CT abdomen pelvis also noncontributory.  She has an appointment with her primary provider tomorrow she notes.  She will follow-up with gastroenterology/we will ask her provider who her gastroenterologist is.  She feels significantly better after some symptomatic treatment in the ER.  Her questions were answered.  She understands strict return precautions.  She agrees with plan for discharge home to follow-up as an outpatient with her provider.    I have reviewed the nursing notes. I have reviewed the findings, diagnosis, plan and need for follow up with the patient.    Discharge Medication List as of 9/21/2020  5:19 PM          Final diagnoses:   Abdominal pain, generalized       --    [facility], [place], EMERGENCY DEPARTMENT  9/21/2020     Loida Davis MD  09/21/20 0774

## 2020-09-21 NOTE — TELEPHONE ENCOUNTER
Spoke with Katy through interpretor services. States that her stomach has been sore for a week. Started  vomiting blood clots on Friday about dime few times a day since.  She also has blood clots in her stool that started few weeks ago and getting worse. Blood clots about dimes size as well with her soft stool.  Clots are bright red. Butt area hurts to walk and sit.  Was seen in ED on 09/12/2020 and states they knew about the clots in the stool, but they are getting worse.     Did a virtual Visit with GI 05/04/2020, should she reach out to GI again?    No fever no cough.      She is asking for an appointment with CDL today.      Next 5 appointments (look out 90 days)    Sep 22, 2020 10:30 AM CDT  Office Visit with Aura Rosario PA-C, VIDEO,   [facility] [address]  [facility] [id number] [phone]   Oct 08, 2020  2:30 PM CDT  Return Visit with Fransisco Osman MD  [facility] ([facility]) [address] [place] [id number] [phone]          Will route to CDL for review and advice.  Patient is comfortable waiting for provider to respond.    Iggy Bernstein, EDYTAN, RN, PHN

## 2020-09-21 NOTE — TELEPHONE ENCOUNTER
There is nothing I can do for these issues in clinic. She could reach out to GI or go back to ED.     Also, please see my other message sent yesterday, she is on my schedule for tomorrow. She is restricted to every 2 week apts on Wednesdays at 10 am, phone visit.     Zach Rosario PA-C  [facility]

## 2020-09-21 NOTE — DISCHARGE INSTRUCTIONS
Please make an appointment to follow up with GI Clinic (phone: [phone]) in 1-5 days.  Please follow-up with your primary provider tomorrow.  Fever, shortness of breath, bloody vomiting, or any other concerning symptoms please return to the ER.

## 2020-09-21 NOTE — TELEPHONE ENCOUNTER
Reason for Call:  Other Appointment    Detailed comments: Patient is calling in today in regards to not feeling any better at all. Patient stated that she has had diarrhea all weekend and also is having blood clots. Patient is also throwing up blood clots. Patient stated that her stomach has felt sore over the last week.Thank you    Phone Number Patient can be reached at: Other phone number:  [phone]    Best Time: anytime    Can we leave a detailed message on this number? YES    Call taken on 9/21/2020 at 8:32 AM by Loida Hillman

## 2020-09-22 ENCOUNTER — MYC MEDICAL ADVICE (OUTPATIENT)
Dept: FAMILY MEDICINE | Facility: OTHER | Age: 30
End: 2020-09-22

## 2020-09-22 ENCOUNTER — OFFICE VISIT (OUTPATIENT)
Dept: FAMILY MEDICINE | Facility: OTHER | Age: 30
End: 2020-09-22
Payer: MEDICARE

## 2020-09-22 ENCOUNTER — PATIENT OUTREACH (OUTPATIENT)
Dept: CARE COORDINATION | Facility: CLINIC | Age: 30
End: 2020-09-22

## 2020-09-22 VITALS
WEIGHT: 194 LBS | TEMPERATURE: 99 F | RESPIRATION RATE: 18 BRPM | HEART RATE: 112 BPM | BODY MASS INDEX: 36.66 KG/M2 | OXYGEN SATURATION: 98 % | DIASTOLIC BLOOD PRESSURE: 82 MMHG | SYSTOLIC BLOOD PRESSURE: 120 MMHG

## 2020-09-22 DIAGNOSIS — M72.2 PLANTAR FASCIITIS: Primary | ICD-10-CM

## 2020-09-22 DIAGNOSIS — J45.30 MILD PERSISTENT ASTHMA WITHOUT COMPLICATION: ICD-10-CM

## 2020-09-22 DIAGNOSIS — F32.0 MILD MAJOR DEPRESSION (H): ICD-10-CM

## 2020-09-22 DIAGNOSIS — Z23 NEED FOR PROPHYLACTIC VACCINATION AND INOCULATION AGAINST INFLUENZA: ICD-10-CM

## 2020-09-22 DIAGNOSIS — F41.9 ANXIETY: ICD-10-CM

## 2020-09-22 DIAGNOSIS — F43.23 ADJUSTMENT DISORDER WITH MIXED ANXIETY AND DEPRESSED MOOD: ICD-10-CM

## 2020-09-22 DIAGNOSIS — K50.113 CROHN'S DISEASE OF COLON WITH FISTULA (H): ICD-10-CM

## 2020-09-22 DIAGNOSIS — F31.63 BIPOLAR DISORDER, CURRENT EPISODE MIXED, SEVERE, WITHOUT PSYCHOTIC FEATURES (H): ICD-10-CM

## 2020-09-22 PROCEDURE — 99214 OFFICE O/P EST MOD 30 MIN: CPT | Mod: 25 | Performed by: PHYSICIAN ASSISTANT

## 2020-09-22 PROCEDURE — G0008 ADMIN INFLUENZA VIRUS VAC: HCPCS | Performed by: PHYSICIAN ASSISTANT

## 2020-09-22 PROCEDURE — 90686 IIV4 VACC NO PRSV 0.5 ML IM: CPT | Performed by: PHYSICIAN ASSISTANT

## 2020-09-22 RX ORDER — ALBUTEROL SULFATE 90 UG/1
2 AEROSOL, METERED RESPIRATORY (INHALATION) EVERY 6 HOURS PRN
Qty: 3 INHALER | Refills: 3 | Status: SHIPPED | OUTPATIENT
Start: 2020-09-22 | End: 2021-07-26

## 2020-09-22 RX ORDER — CLONAZEPAM 0.5 MG/1
0.5 TABLET, ORALLY DISINTEGRATING ORAL 2 TIMES DAILY PRN
Qty: 15 TABLET | Refills: 0 | Status: SHIPPED | OUTPATIENT
Start: 2020-09-22 | End: 2020-09-28

## 2020-09-22 RX ORDER — DICYCLOMINE HCL 20 MG
20 TABLET ORAL EVERY 6 HOURS
Qty: 90 TABLET | Refills: 3 | Status: SHIPPED | OUTPATIENT
Start: 2020-09-22 | End: 2021-01-05

## 2020-09-22 RX ORDER — CEPHALEXIN 500 MG/1
CAPSULE ORAL
COMMUNITY
Start: 2020-09-08 | End: 2020-11-04

## 2020-09-22 RX ORDER — FUROSEMIDE 20 MG
20 TABLET ORAL DAILY PRN
Qty: 30 TABLET | Refills: 4 | Status: SHIPPED | OUTPATIENT
Start: 2020-09-22 | End: 2020-11-30

## 2020-09-22 RX ORDER — CLONAZEPAM 0.5 MG/1
0.5 TABLET ORAL 2 TIMES DAILY PRN
Qty: 15 TABLET | Refills: 0 | Status: SHIPPED | OUTPATIENT
Start: 2020-09-22 | End: 2020-10-13

## 2020-09-22 RX ORDER — BUPROPION HYDROCHLORIDE 300 MG/1
300 TABLET ORAL EVERY MORNING
Qty: 90 TABLET | Refills: 1 | Status: SHIPPED | OUTPATIENT
Start: 2020-09-22 | End: 2021-05-12

## 2020-09-22 ASSESSMENT — PATIENT HEALTH QUESTIONNAIRE - PHQ9: SUM OF ALL RESPONSES TO PHQ QUESTIONS 1-9: 6

## 2020-09-22 ASSESSMENT — PAIN SCALES - GENERAL: PAINLEVEL: MODERATE PAIN (4)

## 2020-09-22 NOTE — PATIENT INSTRUCTIONS
- Call for appointments with [facility]     - See below for appointments with GYN and GI     - Increase Wellbutrin to 300 mg     - Recheck 2 weeks       Patient Education     Plantar Fasciitis  Plantar fasciitis is a painful swelling of the plantar fascia. The plantar fascia is a thick, fibrous layer of tissue that covers the bones on the bottom of your foot. It supports the foot bones in an arched position.  Plantar fasciitis can happen gradually or suddenly. It usually affects one foot at a time. Heel pain can be sharp, like a knife sticking into the bottom of your foot. You may feel pain after exercising, long-distance jogging, stair climbing, long periods of standing, or after standing up.  Risk factors include: non-active lifestyle, arthritis, diabetes, obesity or recent weight gain, flat foot, high arch. Wearing high heels, loose shoes, or shoes with poor arch support for long periods of time adds to the risk. This problem is commonly found in runners and dancers. It also found in people who stand on hard surfaces for long periods of time.  Foot pain from this condition is usually worse in the morning. But it often improves with walking. By the end of the day there may be a dull aching. Treatment requires short-term rest and controlling swelling. It may take up to 9 months before all symptoms go away. Rarely, a steroid injection into the foot, or surgery, may be needed.  Home care    If you are overweight, lose weight to help healing.    Choose supportive shoes with good arch support and shock absorbency. Replace athletic shoes when they become worn out. Don t walk or run barefoot.    Premade or custom-fitted shoe inserts may be helpful. Inserts made of silicone seem to be the most effective. Custom-made inserts can be provided by foot specialist, physical therapist, or orthopedist.    Premade or custom-made night splints keep the heel stretched out while you sleep. They may prevent morning  pain.    Limit activities that stress the feet: jogging, prolonged standing or walking, contact sports, etc.    First thing in the morning and before sports, stretch the bottom of your feet. Gently flex your ankle so the toes move toward your knee.    Icing may help control heel pain. Apply an ice pack to the heel for 10 to 20 minutes as a preventive. Or ice your heel after a severe flare-up of symptoms. You may repeat this every 1 to 2 hours as needed.    You may use over-the-counter pain medicine to control pain, unless another medicine was prescribed. Anti-inflammatory pain medicines, such as ibuprofen or naproxen, may work better than acetaminophen. If you have chronic liver or kidney disease or ever had a stomach ulcer or gastrointestinal bleeding, talk with your healthcare provider before using these medicines.  Follow-up care  Follow up with your healthcare provider, or as advised.  Call for an appointment if pain worsens or there is no relief after a few weeks of home treatment. Shoe inserts, a night splint, or a special boot may be required.  If X-rays were taken, you will be told of any new findings that may affect your care.  When to seek medical advice  Call your healthcare provider right away if any of these occur:    Foot swelling    Redness or warmth with increasing pain  Date Last Reviewed: 5/1/2018 2000-2019 [employer]. [address] [place] [address], [place] [address]. All rights reserved. This information is not intended as a substitute for professional medical care. Always follow your healthcare professional's instructions.           Patient Education     Treating Plantar Fasciitis  First, your healthcare provider tries to find out the cause of your problem. He or she can then suggest ways to ease pain. If your pain is due to poor foot mechanics, occasionally custom-made shoe inserts (orthoses) may help.    Ease symptoms    To relieve mild symptoms, try aspirin, ibuprofen, or  other medicines as directed. Rubbing ice on the area may also help.    To lessen severe pain and swelling, your healthcare provider may give you pills or injections. In some cases, you may need a walking cast. Physical therapy, such as ultrasound or a daily stretching program, may also be recommended. Surgery is rarely needed.    To ease symptoms caused by poor foot mechanics, your foot may be taped. This supports the arch and temporarily controls movement. Night splints may also help by stretching the fascia.    Control movement  If taping helps, your healthcare provider may prescribe orthoses. These are inserts built from plaster casts of your feet. They control the way your foot moves. As a result, your symptoms may go away.  Reduce overuse  Every time your foot strikes the ground, the plantar fascia is stretched. You can lessen the strain on the plantar fascia and the chance of overuse by:    Losing any excess weight    Not running on hard or uneven ground    Using orthoses, if recommended, in your shoes and house slippers  If surgery is needed  Your healthcare provider may consider surgery if other types of treatment don't control your pain. During surgery, the plantar fascia is partially cut to release tension. As you heal, fibrous tissue fills the space between the heel bone and the plantar fascia.  Date Last Reviewed: 1/1/2018 2000-2019 [employer]. [address] [place] [address]. All rights reserved. This information is not intended as a substitute for professional medical care. Always follow your healthcare professional's instructions.           Patient Education     Understanding Plantar Fasciitis    Plantar fasciitis is a condition that causes foot and heel pain. The plantar fascia is a tough band of tissue that runs across the bottom of the foot from the heel to the toes. This tissue pulls on the heel bone. It supports the arch of the foot as it pushes off the ground. If the  tissue becomes irritated or red and swollen (inflamed), it is called plantar fasciitis.  How to say it  PLAN-tuhr fa-see-IY-tis   What causes plantar fasciitis?  Plantar fasciitis most often occurs from overusing the plantar fascia. The tissue may become damaged from activities that put repeated stress on the heel and foot. Or it may wear down over time with age and ankle stiffness. You are more likely to have plantar fasciitis if you:    Do activities that require a lot of running, jumping, or dancing    Have a job that requires being on your feet for long periods    Are overweight or obese    Have certain foot problems, such as a tight Achilles tendon, flat feet, or high arches    Often wear poorly fitting shoes  Symptoms of plantar fasciitis  The condition most often causes pain in the heel and the bottom of the foot. The pain may occur when you take your first steps in the morning. It may get better as you walk throughout the day. But as you continue to put weight on the foot, the pain often returns. Pain may also occur after standing or sitting for long periods.  Treating plantar fasciitis  Treatments for plantar fasciitis include:    Resting the foot. This involves limiting movements that make your foot hurt. You may also need to avoid certain sports and types of work for a time.    Using cold packs. Put an ice pack on the heel and foot to help reduce pain and swelling.    Taking pain medicines. Prescription and over-the-counter pain medicines can help relieve pain and swelling.    Using heel cups or foot inserts (orthotics). These are placed in the shoes to help support the heel or arch and cushion the heel. You may also be told to buy proper-fitting shoes with good arch support and cushioned soles.    Taping the foot. This supports the arch and limits the movement of the plantar fascia to help relieve symptoms.    Wearing a night splint. This stretches the plantar fascia and leg muscles while you sleep. This  may help relieve pain.    Doing exercises and physical therapy. These stretch and strengthen the plantar fascia and the muscles in the leg that support the heel and foot.    Getting shots of medicine into the foot. These may help relieve symptoms for a time.    Having surgery. This may be needed if other treatments fail to relieve symptoms. During surgery, the surgeon may partially cut the plantar fascia to release tension.  Possible complications of plantar fasciitis  Without proper care and treatment, healing may take longer than normal. Also, symptoms may continue or get worse. Over time, the plantar fascia may be damaged. This can make it hard to walk or even stand without pain.  When to call your healthcare provider  Call your healthcare provider right away if you have any of these:    Fever of 100.4 F (38 C) or higher, or as directed    Symptoms that don t get better with treatment, or get worse    New symptoms, such as numbness, tingling, or weakness in the foot   Date Last Reviewed: 3/10/2016    2649-4630 [employer]. [address] [place] [address], [place] [address]. All rights reserved. This information is not intended as a substitute for professional medical care. Always follow your healthcare professional's instructions.

## 2020-09-22 NOTE — PROGRESS NOTES
Clinic Care Coordination Contact    Situation: Patient chart reviewed by care coordinator.    Background: patient appears on [facility] discharge list as a potential care coordination candidate.     Assessment: patient was contacted by [facility] 9/14 and declined CC services. Historically, patient has declined CC services.     Plan/Recommendations: CC RN will not place [facility] referral.    RADHA Otto RN Clinic Care Coordinator   [place], [place], [place]  Phone: [phone]

## 2020-09-23 ENCOUNTER — TELEPHONE (OUTPATIENT)
Dept: FAMILY MEDICINE | Facility: OTHER | Age: 30
End: 2020-09-23

## 2020-09-23 ENCOUNTER — TELEPHONE (OUTPATIENT)
Dept: GASTROENTEROLOGY | Facility: CLINIC | Age: 30
End: 2020-09-23

## 2020-09-23 NOTE — TELEPHONE ENCOUNTER
[facility] Call Center    Phone Message    May a detailed message be left on voicemail: yes     Reason for Call: Order(s): Other:   Reason for requested: Stool sample kit for blood and mucus in stool  Date needed: 9/23/2020  Provider name: Junaid Pritchett    Please advise. Thank you.    Action Taken: Message routed to:  Adult Clinics: Gastroenterology (GI) p [id number]    Travel Screening: Not Applicable

## 2020-09-23 NOTE — TELEPHONE ENCOUNTER
"Reason for call:  Patient reporting a symptom    Symptom or request: stomach cramping, \"mucus looking\" stool    Duration (how long have symptoms been present): unkown    Have you been treated for this before? 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

## 2020-09-23 NOTE — TELEPHONE ENCOUNTER
Pending Prescriptions:                       Disp   Refills    furosemide (LASIX) 20 MG tablet            30 tab*1        Sig: Take 1 tablet (20 mg) by mouth daily as needed           (edema)    Routing refill request to provider for review/approval because:  Labs out of range:    BP Readings from Last 3 Encounters:   09/22/20 120/82   09/21/20 (!) 142/90   09/12/20 (!) 139/94

## 2020-09-23 NOTE — TELEPHONE ENCOUNTER
Patient calls with questions about stool sample and what she should do with it. Patient is seeing GI tomorrow and wants to have this sample for testing if needed. Advised patient to contact GI in [place], provided patient with number to contact them, to address questions on need for stool sample as she sees them tomorrow    Michelle Avila RN

## 2020-09-24 ENCOUNTER — TELEPHONE (OUTPATIENT)
Dept: GASTROENTEROLOGY | Facility: CLINIC | Age: 30
End: 2020-09-24

## 2020-09-24 ENCOUNTER — APPOINTMENT (OUTPATIENT)
Dept: GASTROENTEROLOGY | Facility: CLINIC | Age: 30
End: 2020-09-24
Payer: MEDICARE

## 2020-09-24 NOTE — TELEPHONE ENCOUNTER
"LPN contacted patient to complete rooming process through  via telephone.  stated \"someone is answering the call, I will be interpreting.\" Patient answered and  stated \"NO, do not call me back. Fuck off\" and then stated \"the person hung up.\" Writer notified provider.     Cathy Benitez LPN    "

## 2020-09-24 NOTE — TELEPHONE ENCOUNTER
"This was to be addressed today during virtual visit. Patient however did not want to continue with her visit today. Please see telephone encounter 9/24/2020. LPN contacted patient via  services for rooming and patient responded with \"NO, do not call me back. F**k off.\"     Closing encounter at this time.     Junaid Pritchett PA-C  Gastroenterology  [facility]   "

## 2020-09-25 ENCOUNTER — TRANSFERRED RECORDS (OUTPATIENT)
Dept: HEALTH INFORMATION MANAGEMENT | Facility: CLINIC | Age: 30
End: 2020-09-25

## 2020-09-25 DIAGNOSIS — F32.0 MILD MAJOR DEPRESSION (H): ICD-10-CM

## 2020-09-25 DIAGNOSIS — F43.23 ADJUSTMENT DISORDER WITH MIXED ANXIETY AND DEPRESSED MOOD: ICD-10-CM

## 2020-09-25 DIAGNOSIS — F31.63 BIPOLAR DISORDER, CURRENT EPISODE MIXED, SEVERE, WITHOUT PSYCHOTIC FEATURES (H): ICD-10-CM

## 2020-09-26 RX ORDER — CLONAZEPAM 1 MG/1
1 TABLET ORAL 2 TIMES DAILY PRN
Qty: 28 TABLET | Refills: 0 | OUTPATIENT
Start: 2020-09-26

## 2020-09-27 ENCOUNTER — NURSE TRIAGE (OUTPATIENT)
Dept: NURSING | Facility: CLINIC | Age: 30
End: 2020-09-27

## 2020-09-28 ENCOUNTER — MYC REFILL (OUTPATIENT)
Dept: FAMILY MEDICINE | Facility: OTHER | Age: 30
End: 2020-09-28

## 2020-09-28 ENCOUNTER — MYC MEDICAL ADVICE (OUTPATIENT)
Dept: FAMILY MEDICINE | Facility: OTHER | Age: 30
End: 2020-09-28

## 2020-09-28 ENCOUNTER — NURSE TRIAGE (OUTPATIENT)
Dept: NURSING | Facility: CLINIC | Age: 30
End: 2020-09-28

## 2020-09-28 ENCOUNTER — TELEPHONE (OUTPATIENT)
Dept: OBGYN | Facility: CLINIC | Age: 30
End: 2020-09-28

## 2020-09-28 ENCOUNTER — TELEPHONE (OUTPATIENT)
Dept: GASTROENTEROLOGY | Facility: CLINIC | Age: 30
End: 2020-09-28

## 2020-09-28 DIAGNOSIS — F32.0 MILD MAJOR DEPRESSION (H): ICD-10-CM

## 2020-09-28 DIAGNOSIS — F41.9 ANXIETY: ICD-10-CM

## 2020-09-28 RX ORDER — CLONAZEPAM 0.5 MG/1
0.5 TABLET, ORALLY DISINTEGRATING ORAL 2 TIMES DAILY PRN
Qty: 15 TABLET | Refills: 0 | Status: SHIPPED | OUTPATIENT
Start: 2020-09-28 | End: 2020-10-06

## 2020-09-28 RX ORDER — CLONAZEPAM 0.5 MG/1
0.5 TABLET ORAL 2 TIMES DAILY PRN
Qty: 15 TABLET | Refills: 0 | Status: CANCELLED | OUTPATIENT
Start: 2020-09-28

## 2020-09-28 NOTE — TELEPHONE ENCOUNTER
[facility] Call Center    Phone Message    May a detailed message be left on voicemail: no     Reason for Call: Other: Pt called with phone .  States she had a tele visit last Thursday with ABBY Pritchett and no one called her.   Message sent to dept and requested message.  Please call.     Action Taken: Message routed to:  Adult Clinics: Gastroenterology (GI) p [id number]    Travel Screening:

## 2020-09-28 NOTE — TELEPHONE ENCOUNTER
"RN called patient with use of . Patient was relayed Dr. Berumen's recommendation again and patient yelled, \"I don't have it, I have allergies!\" and then hung up on RN.     Claudine Chappell RN on 9/28/2020 at 4:18 PM    "

## 2020-09-28 NOTE — TELEPHONE ENCOUNTER
Patient called back with  and says she hasn't had a covid test done and would like to still come in as it's only allergies that she is experiencing. She is not happy and will not let me cancel her appointment. Please call and advise.

## 2020-09-28 NOTE — TELEPHONE ENCOUNTER
Writer called patient to ask the nature of her call to our office.  Writer spoke through an  to leave a message and asked to leave a message for the patient. Writer noted that the patient had cancelled her appointment last Thursday with Junaid Pritchett and again inquired if she had any questions or concerns to tell our office.  Writer told patient that he was looking forward to speaking with the patient and gave our office call back number as [phone], to assist the patient.  Cesar Miller MA

## 2020-09-28 NOTE — TELEPHONE ENCOUNTER
LM on  for patient to call back. Per Dr Berumen, patient needs to reschedule her appointment on 9/29/20 to be seen in clinic for consult. Per patient  note in chart, patient called stating Covid 19 symtoms. Will need to wait for results of Covid prior to being seen. Please cancel this appointment when patient returns call. Zunilda/MA

## 2020-09-28 NOTE — TELEPHONE ENCOUNTER
Pt is calling.   call.    Seen in the ED yesterday for back pain. CT scan of the spine was negative. No injury. She has a neurostimulator for back pain. Long history of back issues. She feels like the neurostimulator is not helping in any way and she would like this removed now.   Wanting to schedule an appointment to be seen tomorrow.  Now, also complaining of a runny nose. Coughing. Sore throat. Tired. HA. Body aches. Wants more pain medication. Supposed to be tapering off the pain medication. Temp of 99.8.  Respiratory symptoms started this afternoon. Only seen in the ED for back pain.   Friend of hers was ill with the same symptoms 3 days ago. He is awaiting COVID results. She has not been tested.  Patient was very angry and swearing. Wanting pain medication called in.   I advised her that we cannot call in anything stronger for her after hours. If pain is severe, you need to return to the ED for evaluation.   Pt swore, and then hung up the phone.  No advice able to be given. Per , she was very agitated and angry.   Only able to triage back pain. Unable to finish triage with other symptoms. I encouraged her to return to the ED for further pain management. Patient refused the disposition.    Additional Information    Negative: Passed out (i.e., lost consciousness, collapsed and was not responding)    Negative: Shock suspected (e.g., cold/pale/clammy skin, too weak to stand, low BP, rapid pulse)    Negative: Sounds like a life-threatening emergency to the triager    Negative: Major injury to the back (e.g., MVA, fall > 10 feet or 3 meters, penetrating injury, etc.)    Negative: Followed a tailbone injury    Negative: [1] Pain in the upper back over the ribs (rib cage) AND [2] radiates (travels, goes) into chest    Negative: [1] Pain in the upper back over the ribs (rib cage) AND [2] worsened by coughing (or clearly increases with breathing)    Negative: Back pain during pregnancy     Negative: Pain mainly in flank (i.e., in the side, over the lower ribs or just below the ribs)    Negative: [1] SEVERE back pain (e.g., excruciating) AND [2] sudden onset AND [3] age > 60    Negative: [1] Unable to urinate (or only a few drops) > 4 hours AND [2] bladder feels very full (e.g., palpable bladder or strong urge to urinate)    Negative: [1] Loss of bladder or bowel control (urine or bowel incontinence; wetting self, leaking stool) AND [2] new onset    Negative: Numbness in groin or rectal area (i.e., loss of sensation)    Negative: [1] SEVERE abdominal pain AND [2] present > 1 hour    Negative: [1] Abdominal pain AND [2] age > 60    Negative: Weakness of a leg or foot (e.g., unable to bear weight, dragging foot)    Negative: Unable to walk    Negative: Patient sounds very sick or weak to the triager    [1] SEVERE back pain (e.g., excruciating, unable to do any normal activities) AND [2] not improved 2 hours after pain medicine    Protocols used: BACK PAIN-A-    Telma Yoder RN  [facility] Nurse Advisor  9/27/2020 at 8:12 PM

## 2020-09-28 NOTE — TELEPHONE ENCOUNTER
Caller is complaining of stuffy nose with some bloody discharge, low grade fever of 99.5, headache, cough and chest burning and or pressure. Caller states she thinks she has covid-19. Caller denies any difficulty breathing. Triage guidelines recommend to [facility] for virtual visit with provider. Caller hung up when she was told to quarantine for 14 days with covid-19 symptoms.  Caller had a  during this call.  COVID 19 Nurse Triage Plan/Patient Instructions    Please be aware that novel coronavirus (COVID-19) may be circulating in the community. If you develop symptoms such as fever, cough, or SOB or if you have concerns about the presence of another infection including coronavirus (COVID-19), please contact your health care provider or visit [email or web address].     Disposition/Instructions    Virtual Visit with provider recommended. Reference Visit Selection Guide.    Thank you for taking steps to prevent the spread of this virus.  o Limit your contact with others.  o Wear a simple mask to cover your cough.  o Wash your hands well and often.    Resources    [facility]: About COVID-19: [email or web address]    CDC: What to Do If You're Sick: www.cdc.gov/coronavirus/2019-ncov/about/steps-when-sick.html    CDC: Ending Home Isolation: www.cdc.gov/coronavirus/2019-ncov/hcp/disposition-in-home-patients.html     CDC: Caring for Someone: www.cdc.gov/coronavirus/2019-ncov/if-you-are-sick/care-for-someone.html     [facility]: Interim Guidance for Hospital Discharge to Home: www.health.[facility].[place]/diseases/coronavirus/hcp/hospdischarge.pdf    [facility] clinical trials (COVID-19 research studies): clinicalaffairs.[facility]/um-clinical-trials     Below are the COVID-19 hotlines at the [place] Department of Health ([facility]). Interpreters are available.   o For health questions: Call [phone] or 1-257.623.9223 (7 a.m. to 7 p.m.)  o For questions about schools and childcare: Call  [phone] or 1-114.422.5414 (7 a.m. to 7 p.m.)                     Reason for Disposition    Chest pain or pressure    HIGH RISK patient (e.g., age > 64 years, diabetes, heart or lung disease, weak immune system) (Exception: Has already been evaluated by healthcare provider and has no new or worsening symptoms)    Additional Information    Negative: SEVERE difficulty breathing (e.g., struggling for each breath, speaks in single words)    Negative: Difficult to awaken or acting confused (e.g., disoriented, slurred speech)    Negative: Bluish (or gray) lips or face now    Negative: Shock suspected (e.g., cold/pale/clammy skin, too weak to stand, low BP, rapid pulse)    Negative: Sounds like a life-threatening emergency to the triager    Negative: [1] COVID-19 exposure AND [2] no symptoms    Negative: COVID-19 and Breastfeeding, questions about    Negative: [1] Adult with possible COVID-19 symptoms AND [2] triager concerned about severity of symptoms or other causes    Negative: SEVERE or constant chest pain or pressure (Exception: mild central chest pain, present only when coughing)    Negative: MODERATE difficulty breathing (e.g., speaks in phrases, SOB even at rest, pulse 100-120)    Negative: Patient sounds very sick or weak to the triager    Negative: MILD difficulty breathing (e.g., minimal/no SOB at rest, SOB with walking, pulse <100)    Protocols used: CORONAVIRUS (COVID-19) DIAGNOSED OR [id number]-OL 8.4.20

## 2020-09-29 ENCOUNTER — TELEPHONE (OUTPATIENT)
Dept: FAMILY MEDICINE | Facility: OTHER | Age: 30
End: 2020-09-29

## 2020-09-29 NOTE — TELEPHONE ENCOUNTER
Prior Authorization Retail Medication Request    Medication/Dose: methocarbamol (ROBAXIN) 750 MG tablet   ICD code (if different than what is on RX):    Previously Tried and Failed:    Rationale:      Insurance Name:    Insurance ID:        Pharmacy Information (if different than what is on RX)  Name:    Phone:

## 2020-09-29 NOTE — TELEPHONE ENCOUNTER
Pending Prescriptions:                       Disp   Refills    clonazePAM (KLONOPIN) 0.5 MG tablet        15 tab*0        Sig: Take 1 tablet (0.5 mg) by mouth 2 times daily as           needed for anxiety    Routing refill request to provider for review/approval because:  Drug not on the [facility] refill protocol   clonazePAM (KLONOPIN) 0.5 MG tablet  15 tablet  0  9/22/2020

## 2020-09-29 NOTE — TELEPHONE ENCOUNTER
Central Prior Authorization Team   Phone: [phone]      PA Initiation    Medication: methocarbamol (ROBAXIN) 750 MG tablet -Initiated  Insurance Company: Medicare Blue - Phone [phone] Fax [phone]  Pharmacy Filling the Rx: [id number] [place], [place] - [address]  Filling Pharmacy Phone: [phone]  Filling Pharmacy Fax:    Start Date: 9/29/2020

## 2020-09-29 NOTE — TELEPHONE ENCOUNTER
Prior Authorization Approval    Authorization Effective Date: 7/1/2020  Authorization Expiration Date: 9/29/2021  Medication: methocarbamol (ROBAXIN) 750 MG tablet -APPROVED  Approved Dose/Quantity:   Reference #:     Insurance Company: Medicare Blue - Phone [phone] Fax [phone]  Expected CoPay:       CoPay Card Available:      Foundation Assistance Needed:    Which Pharmacy is filling the prescription (Not needed for infusion/clinic administered): [facility] [address] [place] [address]  Pharmacy Notified: Yes  Patient Notified: No    Pharmacy will notify patient when medication is ready.

## 2020-09-30 ENCOUNTER — VIRTUAL VISIT (OUTPATIENT)
Dept: FAMILY MEDICINE | Facility: CLINIC | Age: 30
End: 2020-09-30
Payer: MEDICARE

## 2020-09-30 DIAGNOSIS — Z76.5 DRUG-SEEKING BEHAVIOR: ICD-10-CM

## 2020-09-30 DIAGNOSIS — Z98.890 H/O LUMBOSACRAL SPINE SURGERY: Primary | ICD-10-CM

## 2020-09-30 DIAGNOSIS — J06.9 VIRAL URI: ICD-10-CM

## 2020-09-30 PROCEDURE — 99441 ZZC PHYSICIAN TELEPHONE EVALUATION 5-10 MIN: CPT | Performed by: PHYSICIAN ASSISTANT

## 2020-09-30 NOTE — TELEPHONE ENCOUNTER
"LPN spoke with patient via . Patient stated \"I need to see Junaid. I am having horrible stomach cramps and bleeding.\" Writer asked patient where she is having the bleeding and patient stated \"my a**, my a**.\" Writer informed patient that provider is currently doing virtual visit and patient replied \"I know.\" Writer assisted patient with scheduling a telephone visit with Junaid Pritchett PA-C on 10/6. Patient ended call abruptly.     Cathy Benitez LPN    "

## 2020-10-01 ENCOUNTER — TELEPHONE (OUTPATIENT)
Dept: FAMILY MEDICINE | Facility: OTHER | Age: 30
End: 2020-10-01

## 2020-10-01 NOTE — TELEPHONE ENCOUNTER
Reason for Call:  Other call back    Detailed comments: Patient is calling to find out if we can give her the results for the Covid 19 test. She called the clinic where she had it done but they have not gotten back to her. She is worried because if the results come back negative because she has had dried blood in her nose.     Phone Number Patient can be reached at: Cell number on file:    Telephone Information:   Mobile [phone]       Best Time: any    Can we leave a detailed message on this number? YES    Call taken on 10/1/2020 at 1:46 PM by Souleymane Garcia

## 2020-10-01 NOTE — TELEPHONE ENCOUNTER
Reason for Call:  Medication or medication refill:Medication   Do you use a [facility]?  Name of the pharmacy and phone number for the current request:  [facility] [place]     Name of the medication requested: Medication     Other request: Patient thinks she has a sinus infection     Can we leave a detailed message on this number? YES    Phone number patient can be reached at: Home number on file [phone] (home)    Best Time: Anytime     Call taken on 10/1/2020 at 2:40 PM by Vanesa Linn

## 2020-10-01 NOTE — TELEPHONE ENCOUNTER
Called to speak with the patient regarding her symptoms. Patient informed me that her COVID test came back negative. Patient had a virtual visit with Ines Tong on 9/30/20. Will route to provider and team for review.     Patient states that she is still having yellow/green mucus. Sometimes she has bloody noses. She is experiencing facial pain and a headache. She took 600mg for her pain. Patient developed a low grade fever last night. (ranges ). She has a slight cough and feels fatigued. Please advise prescribing antibiotic for sinus infection without re-evaluation. [facility] [place]   Jose Finney RN  October 1, 2020

## 2020-10-01 NOTE — TELEPHONE ENCOUNTER
Spoke with pt yesterday over the phone (she ended call early and hung up on me). She has had 4 days of URI sx, she was endorsing improving course. Advised watch and wait over next few days, sx management since already was improving. LINETTE PolkC

## 2020-10-02 ENCOUNTER — TRANSFERRED RECORDS (OUTPATIENT)
Dept: HEALTH INFORMATION MANAGEMENT | Facility: CLINIC | Age: 30
End: 2020-10-02

## 2020-10-05 ENCOUNTER — MYC MEDICAL ADVICE (OUTPATIENT)
Dept: FAMILY MEDICINE | Facility: OTHER | Age: 30
End: 2020-10-05

## 2020-10-05 ENCOUNTER — MYC REFILL (OUTPATIENT)
Dept: FAMILY MEDICINE | Facility: OTHER | Age: 30
End: 2020-10-05

## 2020-10-05 ENCOUNTER — TRANSFERRED RECORDS (OUTPATIENT)
Dept: HEALTH INFORMATION MANAGEMENT | Facility: CLINIC | Age: 30
End: 2020-10-05

## 2020-10-05 DIAGNOSIS — F32.0 MILD MAJOR DEPRESSION (H): ICD-10-CM

## 2020-10-05 DIAGNOSIS — F41.9 ANXIETY: ICD-10-CM

## 2020-10-05 NOTE — TELEPHONE ENCOUNTER
Pt is calling on the status of her medication request.  clonazePAM (KLONOPIN) 0.5 MG ODT  She is hoping that this can be sent over. 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

## 2020-10-06 ENCOUNTER — VIRTUAL VISIT (OUTPATIENT)
Dept: GASTROENTEROLOGY | Facility: CLINIC | Age: 30
End: 2020-10-06
Payer: MEDICARE

## 2020-10-06 DIAGNOSIS — F41.9 ANXIETY: ICD-10-CM

## 2020-10-06 DIAGNOSIS — F32.0 MILD MAJOR DEPRESSION (H): ICD-10-CM

## 2020-10-06 DIAGNOSIS — Z53.9 ERRONEOUS ENCOUNTER--DISREGARD: Primary | ICD-10-CM

## 2020-10-06 PROCEDURE — 99207 PR NON-BILLABLE SERV PER CHARTING: CPT | Mod: TEL | Performed by: PHYSICIAN ASSISTANT

## 2020-10-06 RX ORDER — CLONAZEPAM 0.5 MG/1
0.5 TABLET, ORALLY DISINTEGRATING ORAL 2 TIMES DAILY PRN
Qty: 15 TABLET | Refills: 0 | Status: SHIPPED | OUTPATIENT
Start: 2020-10-06 | End: 2020-11-04

## 2020-10-06 RX ORDER — CLONAZEPAM 0.5 MG/1
0.5 TABLET, ORALLY DISINTEGRATING ORAL 2 TIMES DAILY PRN
Qty: 15 TABLET | Refills: 0 | OUTPATIENT
Start: 2020-10-06

## 2020-10-06 RX ORDER — CLONAZEPAM 0.5 MG/1
0.5 TABLET ORAL 2 TIMES DAILY PRN
Qty: 15 TABLET | Refills: 0 | OUTPATIENT
Start: 2020-10-06

## 2020-10-06 NOTE — TELEPHONE ENCOUNTER
clonazePAM (KLONOPIN) 0.5 MG ODT 15 tablet 0 9/28/2020  No   Sig - Route: Take 1 tablet (0.5 mg) by mouth 2 times daily as needed for anxiety - Oral   Sent to pharmacy as: clonazePAM 0.5 MG Oral Tablet Disintegrating (klonoPIN)   Class: E-Prescribe   Order: [id number]   E-Prescribing Status: Receipt confirmed by pharmacy (9/28/2020  4:37 PM CDT)       Last Written Prescription Date:  09/28/20  Last Fill Quantity: 15,   # refills: 0  Last Office Visit: 09/30/20  Future Office visit:    Next 5 appointments (look out 90 days)    Oct 08, 2020  2:30 PM  Return Visit with Fransisco Osman MD, ASL IS  [facility] ([facility]) [address] [place] [id number] [phone]         Patient has a GI appointment scheduled today 10/06/20. Will follow up with Zach on 10/07/20. Patient stated she has one pill left of her Clonazepam.       Routing refill request to provider for review/approval because:  Drug not on the [facility], [facility] or [facility] refill protocol or controlled substance    Jose Finney RN  October 6, 2020

## 2020-10-06 NOTE — PROGRESS NOTES
"Katy Islas is a 30 year old female who is being evaluated via a billable telephone visit.      The patient has been notified of following:     \"This telephone visit will be conducted via a call between you and your physician/provider. We have found that certain health care needs can be provided without the need for a physical exam.  This service lets us provide the care you need with a short phone conversation.  If a prescription is necessary we can send it directly to your pharmacy.  If lab work is needed we can place an order for that and you can then stop by our lab to have the test done at a later time.    Telephone visits are billed at different rates depending on your insurance coverage. During this emergency period, for some insurers they may be billed the same as an in-person visit.  Please reach out to your insurance provider with any questions.    If during the course of the call the physician/provider feels a telephone visit is not appropriate, you will not be charged for this service.\"    Patient has given verbal consent for Telephone visit?  Yes    What phone number would you like to be contacted at? [phone]     [facility]    Phone call duration:  minutes    Cesar Miller MA      "

## 2020-10-06 NOTE — TELEPHONE ENCOUNTER
Pending Prescriptions:                       Disp   Refills    clonazePAM (KLONOPIN) 0.5 MG ODT           15 tab*0        Sig: Take 1 tablet (0.5 mg) by mouth 2 times daily as           needed for anxiety      Routing refill request to provider for review/approval because:  Drug not on the [facility] refill protocol     Last Written Prescription Date:  9/22/2020  Last Fill Quantity: 15,  # refills: 0   Last office visit: 9/22/2020 with prescribing provider:     Future Office Visit:   Next 5 appointments (look out 90 days)    Oct 08, 2020  2:30 PM  Return Visit with Fransisco Osman MD, ASL IS  [facility] ([facility]) [address] [place] [id number] [phone]           Isabella Vazquez RN BSN

## 2020-10-06 NOTE — TELEPHONE ENCOUNTER
Refused Prescriptions:                       Disp   Refills    clonazePAM (KLONOPIN) 0.5 MG tablet        15 tab*0        Sig: Take 1 tablet (0.5 mg) by mouth 2 times daily as           needed for anxiety    Duplicate.    Isabella Vazquez RN BSN

## 2020-10-06 NOTE — TELEPHONE ENCOUNTER
Patient was given rx on 9/28/20. This was a 2 week supply.     Will not fill until 10/12/20. Will discuss at her visit tomorrow.     Zach Rosario PA-C  [facility]

## 2020-10-06 NOTE — PROGRESS NOTES
1:56 PM: called patient for scheduled 2 PM telephone visit, no answer. LVM for patient through . Will re attempt call once more.    2:08 PM: re-attempted to connect with patient for scheduled telephone visit, however no answer. LVM for patient through  for patient to call back at this time for telephone visit otherwise will need to reschedule.      Patient no-showed today's appointment as she was unable to be reached.    Junaid Pritchett PA-C  Gastroenterology  [facility]      no

## 2020-10-08 ENCOUNTER — OFFICE VISIT (OUTPATIENT)
Dept: CARDIOLOGY | Facility: CLINIC | Age: 30
End: 2020-10-08
Payer: MEDICARE

## 2020-10-08 ENCOUNTER — ANCILLARY PROCEDURE (OUTPATIENT)
Dept: CARDIOLOGY | Facility: CLINIC | Age: 30
End: 2020-10-08
Attending: INTERNAL MEDICINE
Payer: MEDICARE

## 2020-10-08 ENCOUNTER — TRANSFERRED RECORDS (OUTPATIENT)
Dept: HEALTH INFORMATION MANAGEMENT | Facility: CLINIC | Age: 30
End: 2020-10-08

## 2020-10-08 VITALS
OXYGEN SATURATION: 94 % | SYSTOLIC BLOOD PRESSURE: 99 MMHG | DIASTOLIC BLOOD PRESSURE: 68 MMHG | WEIGHT: 196.5 LBS | BODY MASS INDEX: 37.13 KG/M2 | HEART RATE: 93 BPM

## 2020-10-08 DIAGNOSIS — R06.09 EXERTIONAL DYSPNEA: ICD-10-CM

## 2020-10-08 DIAGNOSIS — Z82.49 FAMILY HISTORY OF ISCHEMIC HEART DISEASE: ICD-10-CM

## 2020-10-08 DIAGNOSIS — R00.2 PALPITATIONS: Primary | ICD-10-CM

## 2020-10-08 DIAGNOSIS — R03.0 ELEVATED BP WITHOUT DIAGNOSIS OF HYPERTENSION: ICD-10-CM

## 2020-10-08 PROCEDURE — 93306 TTE W/DOPPLER COMPLETE: CPT | Performed by: INTERNAL MEDICINE

## 2020-10-08 PROCEDURE — 99214 OFFICE O/P EST MOD 30 MIN: CPT | Performed by: INTERNAL MEDICINE

## 2020-10-08 RX ORDER — LAMOTRIGINE 25 MG/1
TABLET ORAL
COMMUNITY
Start: 2020-10-05 | End: 2021-07-21

## 2020-10-08 RX ORDER — HYOSCYAMINE SULFATE 0.125 MG
TABLET ORAL
COMMUNITY
Start: 2019-11-26 | End: 2021-07-21

## 2020-10-08 RX ORDER — OXYCODONE AND ACETAMINOPHEN 7.5; 325 MG/1; MG/1
TABLET ORAL
COMMUNITY
End: 2020-11-04

## 2020-10-08 ASSESSMENT — PAIN SCALES - GENERAL: PAINLEVEL: NO PAIN (0)

## 2020-10-08 NOTE — NURSING NOTE
Katy Islas's goals for this visit include:   Chief Complaint   Patient presents with     RECHECK     2 month follow up, echo and zidaijatch       She requests these members of her care team be copied on today's visit information: no    PCP: Aura Rosario    Referring Provider:  No referring provider defined for this encounter.    BP 99/68 (BP Location: Left arm, Patient Position: Sitting, Cuff Size: Adult Large)   Pulse 93   Wt 89.1 kg (196 lb 8 oz)   SpO2 94%   BMI 37.13 kg/m      Do you need any medication refills at today's visit? no

## 2020-10-08 NOTE — PROGRESS NOTES
Chief complaint: hypertension, palpitations    HPI:   Interview was conducted with the aid of a trained medical .     Katy Islas is a 30 year old female with history of multiple medical problems including Crohns disease, HTN, and chronic back pain status post recent spinal stimulator who presents for evaluation of elevated blood pressure and palpitations.    Last visit (20):  She reports several complaints as detailed below:    1. Elevated blood pressure  2. Elevated heart rate  3. Palpitations  Her home BPs average 130s/80s. She has rare very BPs, up to 170s/110s. Very high BPs do not always coincide with pain or anxiety.    She has intermittent sensation of rapid heartbeat/palpitations, ~1x/week.     3. Bilateral leg swelling: Her legs swell and become painful with walking and throughout the day. She noticed this was particularly bad 2 months ago. Prolonged standing also elicits pain and swelling.   This improves with elevating legs.     4. Exertional dyspnea: She reports exertional dyspnea with threshold for dyspnea of mowing the lawn.     5. Family history of CAD:  Her father had history of heart failure. He had history of HTN and was started on BP meds at 35. He had an MI at 50 and a CABG at 57, subsequently developed HF and ultimately  of HF at 58.   Paternal uncle (living) and grandfather ( at 68) also had history of CAD and HF.   Katy is concerned that she may be at risk for these conditions and that her symptoms may reflect early-onset CAD or heart failure.    Interval history (10/08/20):  Since her last visit, she had a spinal neurostimulator placed. She reports that her weight has been variable and that she is trying to lose weight currently.     BPs at home average 110s-120s/70s-80s.       ROS is otherwise notable for occasional palpitations, headaches, chronic back/leg/abdominal pain.     she denies any dyspnea at rest, PND, orthopnea, or syncope.        PAST MEDICAL HISTORY:  Past Medical History:   Diagnosis Date     Abdominal pain 10/31- 11/4/2005    [facility] admit for Cro[facility]     Allergic rhinitis, cause unspecified     Allergic rhinitis     Arthritis      C. difficile diarrhea     Past, no current diarrhea.     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, without mention of intractable epilepsy     pseudoseizures diagnosed after extensive neurologic eval     Migraine 07/21/12    D/C 07/22/12-[facility]     Migraine, unspecified, without mention of intractable migraine without mention of status migrainosus     Migraine     Mild intermittent asthma     mild intermittent     Mycoplasma infection in conditions classified elsewhere and of unspecified site      Other chronic pain     Back pain for 6 years     Renal disease     Kidney stones     Unspecified hearing loss     congenital hearing loss       CURRENT MEDICATIONS:  Current Outpatient Medications   Medication Sig Dispense Refill     acetaminophen (TYLENOL) 500 MG tablet Take 2 tablets (1,000 mg) by mouth 3 times daily as needed for mild pain 100 tablet 3     albuterol (PROAIR HFA/PROVENTIL HFA/VENTOLIN HFA) 108 (90 Base) MCG/ACT inhaler Inhale 2 puffs into the lungs every 6 hours as needed for shortness of breath / dyspnea or wheezing 3 Inhaler 3     albuterol (PROVENTIL) (2.5 MG/3ML) 0.083% neb solution Take 1 vial (2.5 mg) by nebulization every 6 hours as needed for shortness of breath / dyspnea or wheezing 50 vial 11     alum & mag  hydroxide-simethicone (MAALOX ADVANCED MAX ST) 400-400-40 MG/5ML SUSP suspension Take 15 mLs by mouth every 4 hours as needed for indigestion (mix with lidocaine) 355 mL 11     ARIPiprazole (ABILIFY) 30 MG tablet Take 1 tablet (30 mg) by mouth At Bedtime 30 tablet 1     aspirin (ASA) 81 MG tablet Take 1 tablet (81 mg) by mouth daily 90 tablet 3     azelastine (OPTIVAR) 0.05 % ophthalmic solution Place 1 drop into both eyes 2 times daily 6 mL 11     buPROPion (WELLBUTRIN XL) 300 MG 24 hr tablet Take 1 tablet (300 mg) by mouth every morning 90 tablet 1     busPIRone (BUSPAR) 15 MG tablet Take 1 tablet (15 mg) by mouth 2 times daily 60 tablet 5     carisoprodol (SOMA) 350 MG tablet Take 1 tablet (350 mg) by mouth 3 times daily as needed for muscle spasms 10 tablet 0     cephALEXin (KEFLEX) 500 MG capsule        clonazePAM (KLONOPIN) 0.5 MG ODT Take 1 tablet (0.5 mg) by mouth 2 times daily as needed for anxiety 15 tablet 0     cyclobenzaprine (FLEXERIL) 10 MG tablet Take 1 tablet (10 mg) by mouth 3 times daily as needed for muscle spasms or other (back pain) 90 tablet 3     diclofenac (VOLTAREN) 1 % topical gel Place 2 g onto the skin 4 times daily as needed for moderate pain Stop if any gi upset or symptoms 100 g 0     dicyclomine (BENTYL) 20 MG tablet Take 1 tablet (20 mg) by mouth every 6 hours 90 tablet 3     diphenhydrAMINE (BENADRYL) 50 MG capsule Take 1-2 capsules ( mg) by mouth every 6 hours as needed for itching, allergies or sleep 90 capsule 3     DULoxetine (CYMBALTA) 60 MG EC capsule Take 1 capsule (60 mg) by mouth daily 90 capsule 3     EPINEPHrine (ANY BX GENERIC EQUIV) 0.3 MG/0.3ML injection 2-pack Inject 0.3 mLs (0.3 mg) into the muscle once as needed for anaphylaxis 0.6 mL 3     fexofenadine (ALLEGRA) 180 MG tablet Take 1 tablet (180 mg) by mouth daily 90 tablet 3     fluticasone (FLONASE) 50 MCG/ACT nasal spray Spray 1 spray into both nostrils daily 16 g 1     furosemide (LASIX) 20 MG tablet Take  1 tablet (20 mg) by mouth daily as needed (edema) 30 tablet 4     gabapentin (NEURONTIN) 300 MG capsule Take 1 capsule (300 mg) by mouth 3 times daily 270 capsule 3     hydrochlorothiazide (HYDRODIURIL) 12.5 MG tablet Take 1 tablet (12.5 mg) by mouth daily 30 tablet 1     HYDROcodone-acetaminophen (NORCO) 5-325 MG tablet        hyoscyamine (LEVSIN) 0.125 MG tablet        lamoTRIgine (LAMICTAL) 25 MG tablet        lidocaine (XYLOCAINE) 2 % solution Swish and swallow 15 mLs in mouth every 4 hours as needed for other (GERD) ; Max 8 doses/24 hour period. Mix with 15 mLs Maalox or Mylanta. 100 mL 3     losartan (COZAAR) 100 MG tablet Take 1 tablet (100 mg) by mouth daily 90 tablet 3     medical cannabis (Patient's own supply) (The purpose of this order is to document that the patient reports taking medical cannabis.  This is not a prescription, and is not used to certify that the patient has a qualifying medical condition.) 0 Information only 0     medroxyPROGESTERone (DEPO-PROVERA) 150 MG/ML IM injection Inject 1 mL (150 mg) into the muscle every 3 months 3 mL 3     methocarbamol (ROBAXIN) 750 MG tablet Take 1 tablet (750 mg) by mouth 3 times daily as needed for muscle spasms 60 tablet 3     NONFORMULARY Apply 30 mLs topically 4 times daily       nystatin (MYCOSTATIN) 981371 UNIT/ML suspension Take 5 mLs (500,000 Units) by mouth 4 times daily (swish and spit) 400 mL 1     ondansetron (ZOFRAN-ODT) 8 MG ODT tab Take 1 tablet (8 mg) by mouth every 8 hours as needed for nausea 15 tablet 0     oxyCODONE-acetaminophen (PERCOCET) 7.5-325 MG per tablet oxycodone-acetaminophen 7.5 mg-325 mg tablet       pantoprazole (PROTONIX) 40 MG EC tablet Take 1 tablet (40 mg) by mouth 2 times daily Take 30-60 minutes before a meal. 180 tablet 3     polyethylene glycol (MIRALAX) 17 g packet Take 17 g by mouth 2 times daily 60 packet 0     prazosin (MINIPRESS) 1 MG capsule Take 1 mg by mouth At Bedtime  5     prochlorperazine (COMPAZINE) 10  MG tablet Take 1 tablet (10 mg) by mouth every 6 hours as needed for nausea or vomiting 60 tablet 11     rizatriptan (MAXALT-MLT) 5 MG ODT Take 1-2 tablets (5-10 mg) by mouth at onset of headache for migraine May repeat in 2 hours. Max 6 tablets/24 hours. 18 tablet 3     sucralfate (CARAFATE) 1 GM tablet Take 1 tablet (1 g) by mouth 4 times daily as needed (heartburn) 360 tablet 3     valACYclovir (VALTREX) 1000 mg tablet 2 tabs at onset of mouth sores.  Repeat dose in 12 hours.       clonazePAM (KLONOPIN) 0.5 MG tablet Take 1 tablet (0.5 mg) by mouth 2 times daily as needed for anxiety (Patient not taking: Reported on 10/8/2020) 15 tablet 0       PAST SURGICAL HISTORY:  Past Surgical History:   Procedure Laterality Date     AS REMOVAL OF COCCYX  10/18/2017     C FUSION OF SACROILIAC JOINT  10/26/2018     COLONOSCOPY  7/1/2009    [facility].     COLONOSCOPY N/A 7/17/2019    Procedure: Colonoscopy, With Polypectomy And Biopsy;  Surgeon: Edwin Conde MD;  Location: [facility] OR     COLONOSCOPY WITH CO2 INSUFFLATION N/A 7/17/2019    Procedure: COLONOSCOPY, WITH CO2 INSUFFLATION;  Surgeon: Edwin Conde MD;  Location: [facility]     COMBINED ESOPHAGOSCOPY, GASTROSCOPY, DUODENOSCOPY (EGD) WITH CO2 INSUFFLATION N/A 4/12/2019    Procedure: COMBINED ESOPHAGOSCOPY, GASTROSCOPY, DUODENOSCOPY (EGD) WITH CO2 INSUFFLATION;  Surgeon: Edwin Conde MD;  Location: [facility] OR     ESOPHAGOSCOPY, GASTROSCOPY, DUODENOSCOPY (EGD), COMBINED N/A 4/12/2019    Procedure: Combined Esophagoscopy, Gastroscopy, Duodenoscopy (Egd), Biopsy Single Or Multiple;  Surgeon: Edwin Conde MD;  Location: [facility]     FISTULOTOMY RECTUM N/A 4/30/2020    Procedure: EXAM UNDER ANESTHESIA, ANUS, parital fistulotomy;  Surgeon: Valentin Sanchez MD;  Location: [facility] OR     FUSION SPINE POSTERIOR MINIMALLY INVASIVE ONE LEVEL N/A 2/23/2017    Procedure: FUSION SPINE POSTERIOR MINIMALLY INVASIVE ONE  LEVEL;  L4-5 Oblique Lateral Lumbar Interbody Fusion   Epidural steroid injection.   Transpedicular Bone marrow aspiration;  Surgeon: Jeniffer Eugene MD;  Location: [facility] OR     HC COLONOSCOPY THRU STOMA WITH BIOPSY  10/29/2003    Impression is that of normal appearing colonoscopy, without evidence of rectal bleeding.     HC COLONOSCOPY THRU STOMA, DIAGNOSTIC  10/00    normal     HC COLONOSCOPY THRU STOMA, DIAGNOSTIC  Oct 2009    Dr López- normal     HC EEG AWAKE AND SLEEP      abnormal     HC MRI BRAIN W/O CONTRAST  12/00    normal     HC REMOVAL GALLBLADDER  8/5/2009    [facility], [facility].     HC UGI ENDOSCOPY DIAG W BIOPSY  11/11/09    Normal esophagus     HC UGI ENDOSCOPY, SIMPLE EXAM  7/00, 10/00    mild chronic esophagitis and duodenitis, neg H pylori     HC UGI ENDOSCOPY, SIMPLE EXAM  01/20/2005    Esophagogastroduodenoscopy, colonoscopy with biopsies.  [facility] UGI ENDOSCOPY, SIMPLE EXAM  7/1/2009    [facility], [facility].      UGI ENDOSCOPY, SIMPLE EXAM  11/11/2009    attempted upper GI, pt. could not tolerate procedure:[facility]     ORTHOPEDIC SURGERY  October 19,2011    diskectomy L4-L5       ALLERGIES:     Allergies   Allergen Reactions     Iohexol Hives     Other reaction(s): Hives  IV contrast; patient states she tolerates contrast if she gets benadryl before  IV contrast; patient states she tolerates contrast if she gets benadryl before     Ativan [Lorazepam] Hives     At the IV site     Baclofen      hives     Bees Hives, Swelling and Difficulty breathing     Caffeine      Contrast Dye      Hives,   Updated 5/10/2016 CT Contrast.     Dilaudid [Hydromorphone] Itching     Iodine Hives     Methocarbamol Swelling     Metoclopramide      Other reaction(s): Tremors  LW Reaction: shaking/sweating     Midazolam      Other reaction(s): Agitation     Monosodium Glutamate      Morphine Other (See Comments)     Difficulty with urination     Nsaids  Other (See Comments)     GI BLEED x2     Other (Do Not Use) Other (See Comments)     Xanaflex- pt becomes disoriented and loses bladder control     Reglan [Metoclopramide Hcl] Other (See Comments)     shaking     Soma [Carisoprodol] Visual Disturbance     Sleep walking     Tizanidine      Topamax Other (See Comments)     Topamax [Topiramate] Nausea     Tingling  GI/Vomit     Tramadol      Severe Headache, Seizure Risk     Tylenol W/Codeine [Acetaminophen-Codeine] Nausea and Itching     Tylenol 3     Versed Other (See Comments)     Zolmitriptan      Makes face feel like its twitching     Droperidol Anxiety     Flu Virus Vaccine Rash      Arm swelling       FAMILY HISTORY:  Her father had history of heart failure. He had history of HTN and was started on BP meds at 35. He had an MI at 50 and a CABG at 57, subsequently developed HF and ultimately  of HF at 58.   Paternal uncle (living) and grandfather ( at 68) also had history of CAD and HF.   Katy is concerned that she may be at risk for these conditions and that her symptoms may reflect early-onset CAD or heart failure.    No family history of premature CAD or sudden death.    SOCIAL HISTORY:  Social History     Tobacco Use     Smoking status: Current Every Day Smoker     Packs/day: 0.25     Years: 5.00     Pack years: 1.25     Types: Cigarettes     Smokeless tobacco: Never Used   Substance Use Topics     Alcohol use: Not Currently     Comment: Not since last 2018     Drug use: Yes     Types: Marijuana     Comment: Medical Marijuana currently-- More CBD       ROS:   A comprehensive 14 point review of systems is negative other than as mentioned in HPI.    Exam:  BP 99/68 (BP Location: Left arm, Patient Position: Sitting, Cuff Size: Adult Large)   Pulse 93   Wt 89.1 kg (196 lb 8 oz)   SpO2 94%   BMI 37.13 kg/m    GENERAL APPEARANCE: healthy, alert and no distress  EYES: no icterus, no xanthelasmas  ENT: normal palate, mucosa moist, no central  cyanosis  NECK: JVP not elevated  RESPIRATORY: lungs clear to auscultation - no rales, rhonchi or wheezes, no use of accessory muscles, no retractions, respirations are unlabored, normal respiratory rate  CARDIOVASCULAR: regular rhythm, normal S1 with physiologic split S2, no S3 or S4 and no murmur, click or rub.  GI: soft, non tender, bowel sounds normal,no abdominal bruits  EXTREMITIES: no edema, no bruits  NEURO: alert and oriented to person/place/time, normal speech, gait and affect  VASC: Radial, dorsalis pedis and posterior tibialis pulses 2+ bilaterally.  SKIN: no ecchymoses, no rashes.  PSYCH: cooperative, affect appropriate.     Labs:  Reviewed.       Testing/Procedures:  ECG 4/27/20: normal sinus rhythm    I personally visualized and interpreted:  ZioPatch 8/13/20-8/16/20: Baseline sinus rhythm. No significant/sustained arrhythmias. Symptoms (13 patient-triggered events) correlated with sinus tachycardia.     TTE 8/13/20: Normal LV/RV size/function/thickness/wall motion, LVEF=55-60%. No significant valvular abnormalities.     Outside results of note:  Outside records from [facility] were obtained, and relevant results/notes have been incorporated into HPI.      Assessment and Plan:   1. Hypertension, controlled at today's visit  2. Family history of CAD and HF  3. Palpitations  Home BPs are normotensive. TTE shows normal LV/RV function and no structural nidus for arrhythmia. ZioPatch shows that symptoms are clearly correlated with sinus tachycardia (and not with any arrhythmia or ectopy.)   Discussed that this workup was overall reassuring against a cardiac etiology for symptoms and also that no changes in treatment were needed at this time from the cardiac standpoint. No further cardiac workup is indicated at this time.    4. Lower extremity edema:  Once again linically euvolemic and with no pitting edema today. By history, her symptomatology is suggestive of lymphedema or venous  insufficiency and a cardiac etiology of edema appears unlikely. I again counseled regarding behavioral measures (elevation, etc.) and compressive garments.     Cardiology follow up as needed.     >50% of this 30-minute visit were spent with the patient on in-person counseling and discussion regarding the above-described diagnostic and therapeutic plan, the rationale for it, and answering all questions.     The patient states understanding and is agreeable with plan.     Fransisco Osman MD  Cardiology    CC  [facility]

## 2020-10-08 NOTE — PATIENT INSTRUCTIONS
You were seen at the [facility] clinic today.  You saw Dr. Fransisco Osman  Here are your Instructions:    1. Follow up with Dr. Osman as needed.

## 2020-10-12 ENCOUNTER — VIRTUAL VISIT (OUTPATIENT)
Dept: FAMILY MEDICINE | Facility: OTHER | Age: 30
End: 2020-10-12
Payer: MEDICARE

## 2020-10-12 DIAGNOSIS — Z98.890 H/O LUMBOSACRAL SPINE SURGERY: Primary | ICD-10-CM

## 2020-10-12 DIAGNOSIS — T14.8XXA WOUND DRAINAGE: ICD-10-CM

## 2020-10-12 DIAGNOSIS — Z76.5 DRUG-SEEKING BEHAVIOR: ICD-10-CM

## 2020-10-12 PROCEDURE — 99442 PR PHYSICIAN TELEPHONE EVALUATION 11-20 MIN: CPT | Mod: 95 | Performed by: PHYSICIAN ASSISTANT

## 2020-10-12 RX ORDER — DOXYCYCLINE 100 MG/1
100 CAPSULE ORAL 2 TIMES DAILY
Qty: 20 CAPSULE | Refills: 0 | Status: SHIPPED | OUTPATIENT
Start: 2020-10-12 | End: 2020-11-04

## 2020-10-12 NOTE — PATIENT INSTRUCTIONS
Will treat you with doxycycline to cover for a lumbar wound infection.  Take as directed and I recommend a probiotic while on this.    If you have spreading redness, increasing drainage of pus or fevers, you should be seen emergently.    Will have you seen within the next week for a wound check.

## 2020-10-12 NOTE — Clinical Note
Are you willing to see her for a wound check sometime later this week? She states her incision is leaking again although the ED provider states it was clean and intact on 10/10. I changed her from Bactrim to doxycycline.    Jim

## 2020-10-12 NOTE — PROGRESS NOTES
"Katy Islas is a 30 year old female who is being evaluated via a billable telephone visit.      The patient has been notified of following:     \"This telephone visit will be conducted via a call between you and your physician/provider. We have found that certain health care needs can be provided without the need for a physical exam.  This service lets us provide the care you need with a short phone conversation.  If a prescription is necessary we can send it directly to your pharmacy.  If lab work is needed we can place an order for that and you can then stop by our lab to have the test done at a later time.    Telephone visits are billed at different rates depending on your insurance coverage. During this emergency period, for some insurers they may be billed the same as an in-person visit.  Please reach out to your insurance provider with any questions.    If during the course of the call the physician/provider feels a telephone visit is not appropriate, you will not be charged for this service.\"    Patient has given verbal consent for Telephone visit?  Yes    What phone number would you like to be contacted at? [phone]    How would you like to obtain your AVS? Billie Johnson     Katy Islas is a 30 year old female who presents via phone visit today for the following health issues:    HPI     Acute Illness  Acute illness concerns:  Had back surgery 5 weeks ago, is concerned this may be related to surgery.   Onset/Duration: yesterday   Symptoms:  Fever: unsure   Chills/Sweats: YES  Headache (location?): no  Sinus Pressure: no  Conjunctivitis:  YES- swollen  Ear Pain: no  Rhinorrhea: no  Congestion: no  Sore Throat: no  Cough: YES   Wheeze: no  Decreased Appetite: YES  Nausea: YES  Vomiting: no  Diarrhea: YES  Dysuria/Freq.: no  Dysuria or Hematuria: no  Fatigue/Achiness: YES  Sick/Strep Exposure: no  Therapies tried and outcome:     Her eyes were swollen with some whitish discharge this " "morning but this has since resolved. She has been experiencing increased nausea, widespread body pain/aches and diarrhea over the past few days. She had recently negative COVID testing on 9/28 due to cough, body aches and low grade fever. She was on a course of Bactrim for a presumed lumbar wound infection which she stopped 3 days ago because it was causing a lot of stomach upset/diarrhea. She woke up this morning and states there is purulent drainage coming from her lumbar wound again. She also feels the area is warm. She is not sure if she has a fever as she cannot find her thermometer but states she is hot and cold at times. She denies a new cough or URI symptoms. She continues to have severe low back pain and states it is worse since her spinal cord stimulator placement 1 month ago. She has been to the ED 10 times for this procedures seeking pain medications and becomes belligerent when medications are refused. She states she was only given a short course of oxycodone by the spine surgeon and that it is \"not enough\".    Review of Systems   Constitutional, HEENT, cardiovascular, pulmonary, gi and gu systems are negative, except as otherwise noted.       Objective   Vitals:  No vitals were obtained today due to virtual visit.  Visit completed via phone ASL interpretor.         Assessment/Plan:    ICD-10-CM    1. H/O lumbosacral spine surgery  Z98.890    2. Wound drainage  T14.8XXA doxycycline hyclate (VIBRAMYCIN) 100 MG capsule   3. Drug-seeking behavior  Z76.5        Continued severe low back pain since her SCS placement last month. She is requesting more pain medication but with her drug seeking history and multiple pain contract infractions, narcotics are no longer being prescribed by this clinic.   She was encouraged to continue with Flexeril, Tylenol, ice and heat as needed and she should speak with her surgeon regarding the worsening pain after surgery.   She is noticing current discharge from her low back " incision but difficult to determine the extent of discharge on a phone visit. The ED provider on 10/10 (2 days ago) stated that her incision was clean and dry and she had a normal lumbar CT on 9/28.  I cannot completely rule out an infection so will treat with doxycycline for 10 days since she did not tolerated the Bactrim.  The diarrhea is likely from the Bactrim as I have very low suspicion for another infectious process such as COVID. She had recently negative COVID testing on 9/28.  I recommend rest and if she has spreading redness around the lumbar incision, increasing purulent drainage, or fevers, she should be seen emergently. I recommend wound check with her PCP within the next week and also recommend she follow-up with her surgeon.   She is agreeable with this plan.      Bob Monson PA-C  [facility]    Phone call duration: 13 minutes

## 2020-10-13 ENCOUNTER — MYC REFILL (OUTPATIENT)
Dept: FAMILY MEDICINE | Facility: OTHER | Age: 30
End: 2020-10-13

## 2020-10-13 ENCOUNTER — MYC MEDICAL ADVICE (OUTPATIENT)
Dept: FAMILY MEDICINE | Facility: OTHER | Age: 30
End: 2020-10-13

## 2020-10-13 DIAGNOSIS — F41.9 ANXIETY: ICD-10-CM

## 2020-10-13 DIAGNOSIS — F32.0 MILD MAJOR DEPRESSION (H): ICD-10-CM

## 2020-10-13 RX ORDER — CLONAZEPAM 0.5 MG/1
0.5 TABLET ORAL 2 TIMES DAILY PRN
Qty: 14 TABLET | Refills: 0 | Status: SHIPPED | OUTPATIENT
Start: 2020-10-13 | End: 2020-10-20

## 2020-10-14 ENCOUNTER — NURSE TRIAGE (OUTPATIENT)
Dept: FAMILY MEDICINE | Facility: OTHER | Age: 30
End: 2020-10-14

## 2020-10-14 NOTE — TELEPHONE ENCOUNTER
"LJ Serrano.   Patient calling back to speak to triage.     Patient stated that for the last two days she has not been able to swallow. She feels a pain in the middle of her chest. States \" When I swallow I can feel the pain move down from my mouth and into my chest.\" She has been able to eat a tiny bites of food, but it has been so painful that she has only been taking in fluids. She is very hungry, but due to the pain won't eat.     Patient states that she has been having diarrhea the past 6 days that is runny. Patient states she hasn't been urinating as much, but did urinate 2 hours ago. She has experienced some abdominal pain rated 5/10 and feels like cramping.   Feels a dull pain in her sternum.   Temperature per patient is 99.5F.     Patient denies sob or difficulty breathing.     Patient stated that she is not able to get into the gastrologist until the end of October. Informed the patient it that may be how it everywhere due to the pandemic and services are just opening back up.     PLAN:   Home advice given. Informed the patient that she may have the stomach flu. To keep sip on clear fluids until she feels better to eat something.   If symptoms worsen informed patient to call me back.   Jose Finney RN  October 14, 2020      Additional Information    Negative: Passed out (i.e., fainted, collapsed and was not responding)    Negative: Shock suspected (e.g., cold/pale/clammy skin, too weak to stand, low BP, rapid pulse)    Negative: Visible sweat on face or sweat is dripping down    Negative: Chest pain    Negative: SEVERE abdominal pain (e.g., excruciating)    Negative: Pain lasting > 10 minutes and over 50 years old    Negative: Pain lasting > 10 minutes and over 40 years old and associated chest, arm, neck, upper back, or jaw pain    Negative: Pain lasting > 10 minutes and over 35 years old and at least one cardiac risk factor    Negative: Pain lasting > 10 minutes and history of heart disease (i.e., " heart attack, bypass surgery, angina, angioplasty, CHF)    Negative: Recent injury to the abdomen    Negative: Vomiting red blood or black (coffee ground) material    Negative: Bloody, black, or tarry bowel movements    Negative: Pregnant > 24 weeks and hand or face swelling    Negative: Constant abdominal pain lasting > 2 hours    Negative: Vomiting bile (green color)    Negative: Patient sounds very sick or weak to the triager    Negative: Vomiting and abdomen looks much more swollen than usual    Negative: White of the eyes have turned yellow (i.e.,  jaundice)    Negative: Fever > 103 F (39.4 C)    Negative: Fever > 101 F (38.3 C) and over 60 years of age    Negative: Fever > 100.0 F (37.8 C) and has diabetes mellitus or a weak immune system (e.g., HIV positive, cancer chemotherapy, organ transplant, splenectomy, chronic steroids)    Negative: Fever > 100.0 F (37.8 C) and bedridden (e.g., nursing home patient, stroke, chronic illness, recovering from surgery)    Negative: Age > 60 years    Negative: Patient wants to be seen    Negative: Mild pain that comes and goes (cramps) lasts > 24 hours    Negative: Alcohol abuse known or suspected    Negative: Abdominal pain is a chronic symptom (recurrent or ongoing AND lasting > 4 weeks)    Negative: Intermittent burning pains radiating into chest or sour taste in mouth    Mild abdominal pain    Protocols used: ABDOMINAL PAIN - UPPER-A-OH

## 2020-10-14 NOTE — TELEPHONE ENCOUNTER
Reason for call:  Patient reporting a symptom    Symptom or request: stomach ache and diarrhea    Duration (how long have symptoms been present):     Have you been treated for this before? No    Additional comments:     Phone Number patient can be reached at:  [phone]     Best Time:      Can we leave a detailed message on this number:  NO    Call taken on 10/14/2020 at 12:07 PM by Atiya Borrego

## 2020-10-15 NOTE — TELEPHONE ENCOUNTER
"Patient calls with continued symptoms of nausea and vomiting along with diarrhea. Patient states he is holding down fluids, just doesn't have an appetite. Per patient she is concerned her recent back incision is causing her an infection. Patient was seen ER last night and given zofran. Is on an antibiotic. Has a follow up with her back specialist today and recently missed her GI appt which has been rescheduled to next week per patient. Patient was advised to present back to the ED due to pain level expressed and ongoing symptoms of diarrhea and vomiting. Patient declines as she was just there last night and they \"did nothing\". Patient states she will just follow up with her back doctor as scheduled today.    Michelle Avila RN    "

## 2020-10-16 DIAGNOSIS — M54.17 LUMBOSACRAL NEURITIS: ICD-10-CM

## 2020-10-16 DIAGNOSIS — M54.50 LOW BACK PAIN: Primary | ICD-10-CM

## 2020-10-16 LAB
BASOPHILS # BLD AUTO: 0 10E9/L (ref 0–0.2)
BASOPHILS NFR BLD AUTO: 0.3 %
CRP SERPL-MCNC: <2.9 MG/L (ref 0–8)
DIFFERENTIAL METHOD BLD: ABNORMAL
EOSINOPHIL # BLD AUTO: 0.1 10E9/L (ref 0–0.7)
EOSINOPHIL NFR BLD AUTO: 1.6 %
ERYTHROCYTE [DISTWIDTH] IN BLOOD BY AUTOMATED COUNT: 12.3 % (ref 10–15)
HCT VFR BLD AUTO: 38.8 % (ref 35–47)
HGB BLD-MCNC: 13.3 G/DL (ref 11.7–15.7)
LYMPHOCYTES # BLD AUTO: 1.6 10E9/L (ref 0.8–5.3)
LYMPHOCYTES NFR BLD AUTO: 21.1 %
MCH RBC QN AUTO: 33.3 PG (ref 26.5–33)
MCHC RBC AUTO-ENTMCNC: 34.3 G/DL (ref 31.5–36.5)
MCV RBC AUTO: 97 FL (ref 78–100)
MONOCYTES # BLD AUTO: 0.6 10E9/L (ref 0–1.3)
MONOCYTES NFR BLD AUTO: 7.9 %
NEUTROPHILS # BLD AUTO: 5.2 10E9/L (ref 1.6–8.3)
NEUTROPHILS NFR BLD AUTO: 69.1 %
PLATELET # BLD AUTO: 310 10E9/L (ref 150–450)
RBC # BLD AUTO: 3.99 10E12/L (ref 3.8–5.2)
WBC # BLD AUTO: 7.6 10E9/L (ref 4–11)

## 2020-10-16 PROCEDURE — 86140 C-REACTIVE PROTEIN: CPT | Performed by: NURSE PRACTITIONER

## 2020-10-16 PROCEDURE — 85025 COMPLETE CBC W/AUTO DIFF WBC: CPT | Performed by: NURSE PRACTITIONER

## 2020-10-16 PROCEDURE — 36415 COLL VENOUS BLD VENIPUNCTURE: CPT | Performed by: NURSE PRACTITIONER

## 2020-10-19 DIAGNOSIS — F41.9 ANXIETY: ICD-10-CM

## 2020-10-19 DIAGNOSIS — F32.0 MILD MAJOR DEPRESSION (H): ICD-10-CM

## 2020-10-20 ENCOUNTER — NURSE TRIAGE (OUTPATIENT)
Dept: FAMILY MEDICINE | Facility: OTHER | Age: 30
End: 2020-10-20

## 2020-10-20 DIAGNOSIS — F32.0 MILD MAJOR DEPRESSION (H): ICD-10-CM

## 2020-10-20 DIAGNOSIS — K21.9 GASTROESOPHAGEAL REFLUX DISEASE WITHOUT ESOPHAGITIS: ICD-10-CM

## 2020-10-20 DIAGNOSIS — F41.9 ANXIETY: ICD-10-CM

## 2020-10-20 RX ORDER — CLONAZEPAM 0.5 MG/1
0.5 TABLET, ORALLY DISINTEGRATING ORAL 2 TIMES DAILY PRN
Qty: 15 TABLET | Refills: 0 | OUTPATIENT
Start: 2020-10-20

## 2020-10-20 RX ORDER — PANTOPRAZOLE SODIUM 40 MG/1
40 TABLET, DELAYED RELEASE ORAL 2 TIMES DAILY
Qty: 180 TABLET | Refills: 3 | Status: SHIPPED | OUTPATIENT
Start: 2020-10-20 | End: 2021-07-26

## 2020-10-20 RX ORDER — CLONAZEPAM 0.5 MG/1
0.5 TABLET ORAL 2 TIMES DAILY PRN
Qty: 28 TABLET | Refills: 0 | Status: SHIPPED | OUTPATIENT
Start: 2020-10-20 | End: 2020-11-02

## 2020-10-20 NOTE — TELEPHONE ENCOUNTER
Katy was notified of refills sent. Her appointment was changed to 10/22/20 at 10:30. Will call back if she has any additional questions or concerns.   Jose Finney RN  October 20, 2020

## 2020-10-20 NOTE — TELEPHONE ENCOUNTER
Pending Prescriptions:                       Disp   Refills    clonazePAM (KLONOPIN) 0.5 MG ODT           15 tab*0        Sig: Take 1 tablet (0.5 mg) by mouth 2 times daily as           needed for anxiety      Last Written Prescription Date:  10/06/2020  Last Fill Quantity: 15,   # refills: 0  Last Office Visit: 10/12/2020  Future Office visit:         Routing refill request to provider for review/approval because:  Drug not on the [facility] refill protocol     Michelle Avila RN

## 2020-10-20 NOTE — TELEPHONE ENCOUNTER
BACKGROUND:   Patient was seen in ED on 10/14/20 for abdominal pain. Throughout documentation it does discuss the patient having urinary symptoms of burning with urination. The provider in the hospital requested that the patient have labs done and she declined to have labs done without the administration of any pain medication.  Writer called to discuss further.     TODAY:   Patient stated that she is feeling much better than yesterday. She was afraid that she had a UTI, but her symptoms seem to have cleared up. Writer informed her to call me back if she starts feeling burning or discomfort with urination.     Patient see's her back surgeon tomorrow and is needing to cancel her appointment with Zach. Zach are you able to work her in any other time this week? (This is her week to have an appointment).     Patient is requesting the following medications to be filled.     ClonazePam (Klonopin) 0.5 mg tablets  Last Seen: 10/12/20  Last Refilled: 10/13/20 14Tablets/Capsules  Refills: 0  Next Visit: 10/21/20- Needs to cancel.     Protonix  Last Seen: 10/12/20  Last Refilled: 12/18/19 180Tablets/Capsules  Refills: 3  Next Visit: 10/21/20     Sent [facility] asking patient which pharmacy.     PLAN:   Huddled with provider.     Jose Finney RN  October 20, 2020

## 2020-10-20 NOTE — TELEPHONE ENCOUNTER
Refill sent.     I am virtual on Thursday and have lots of openings, so OK to move to any time on Thursday for this week.     Zach Rosario PA-C  [facility]

## 2020-10-20 NOTE — TELEPHONE ENCOUNTER
Pt has burning urination and blood in urine. She wants to be seen in person in clinic.Please call her back.

## 2020-10-21 ENCOUNTER — TRANSFERRED RECORDS (OUTPATIENT)
Dept: HEALTH INFORMATION MANAGEMENT | Facility: CLINIC | Age: 30
End: 2020-10-21

## 2020-10-24 ENCOUNTER — TRANSFERRED RECORDS (OUTPATIENT)
Dept: HEALTH INFORMATION MANAGEMENT | Facility: CLINIC | Age: 30
End: 2020-10-24

## 2020-10-24 LAB
CREAT SERPL-MCNC: 0.87 MG/DL (ref 0.57–1.11)
GFR SERPL CREATININE-BSD FRML MDRD: >60 ML/MIN/1.73M2
GLUCOSE SERPL-MCNC: 103 MG/DL (ref 70–105)
POTASSIUM SERPL-SCNC: 4 MMOL/L (ref 3.5–5.1)

## 2020-10-26 ENCOUNTER — APPOINTMENT (OUTPATIENT)
Dept: GASTROENTEROLOGY | Facility: CLINIC | Age: 30
End: 2020-10-26
Payer: MEDICARE

## 2020-10-27 ENCOUNTER — TRANSFERRED RECORDS (OUTPATIENT)
Dept: HEALTH INFORMATION MANAGEMENT | Facility: CLINIC | Age: 30
End: 2020-10-27

## 2020-10-29 ENCOUNTER — TELEPHONE (OUTPATIENT)
Dept: FAMILY MEDICINE | Facility: OTHER | Age: 30
End: 2020-10-29

## 2020-10-29 DIAGNOSIS — N89.8 VAGINAL DISCHARGE: Primary | ICD-10-CM

## 2020-10-29 NOTE — TELEPHONE ENCOUNTER
Patient was informed that Zach placed an order for the wet prep.  Jose Finney RN  October 29, 2020

## 2020-10-29 NOTE — TELEPHONE ENCOUNTER
Patient called and relayed message we made an apt for Friday for a UA but she is also requesting a swab please Advise thank you

## 2020-10-29 NOTE — TELEPHONE ENCOUNTER
She needs to work with her pain clinic and call them about the issues with her pain and spinal cord stimulator.     I will let her come in for a UA and wet prep. Please assist in scheduling this.     She needs to remain calm about her diarrhea issues, she may have blood with her Crohn's. She needs to continue to work with GI specialist regarding this.     We need to re-enforce her scheduled appointments as she has missed the last 2.     Zach Rosario PA-C  [facility]

## 2020-10-29 NOTE — TELEPHONE ENCOUNTER
"Patient was scheduled with Kat Hoffman tomorrow 10/30/20. Writer cancelled appointment and will huddle with Zach regarding symptoms below.     Called the patient to discuss.   Patient stated that she is in a lot of pain. She tool Belbuca for pain.     Stated that she has pain in her lower back that feels like cramps.   She also has thick white discharge that kind of has an odor. Stated that she has recently had a bacterial infection and she feels like it never cleared up. Patient is wanting a test done. No vaginal pain or itching.     Patient also complains that she is bleeding from her Crohn's. She stated that there is bright red blood on the toilet paper. Writer asked if it is painful to have a BM and patient responded with \"yes.\" I also asked if she has a history of hemorrhoids and she stated \" yes, and that is what it feels like. \"    PLAN:   Huddle with Zach regarding patient's symptoms above. Informed the patient tomorrow's appointment was canceled.     Jose Finney RN  October 29, 2020    "

## 2020-10-30 DIAGNOSIS — N89.8 VAGINAL DISCHARGE: ICD-10-CM

## 2020-10-30 LAB
ALBUMIN UR-MCNC: NEGATIVE MG/DL
APPEARANCE UR: CLEAR
BILIRUB UR QL STRIP: NEGATIVE
COLOR UR AUTO: YELLOW
GLUCOSE UR STRIP-MCNC: NEGATIVE MG/DL
HGB UR QL STRIP: NEGATIVE
KETONES UR STRIP-MCNC: NEGATIVE MG/DL
LEUKOCYTE ESTERASE UR QL STRIP: NEGATIVE
NITRATE UR QL: NEGATIVE
PH UR STRIP: 5.5 PH (ref 5–7)
SOURCE: NORMAL
SP GR UR STRIP: 1.02 (ref 1–1.03)
SPECIMEN SOURCE: NORMAL
UROBILINOGEN UR STRIP-ACNC: 0.2 EU/DL (ref 0.2–1)
WET PREP SPEC: NORMAL

## 2020-10-30 PROCEDURE — 87210 SMEAR WET MOUNT SALINE/INK: CPT | Performed by: PHYSICIAN ASSISTANT

## 2020-10-30 PROCEDURE — 81003 URINALYSIS AUTO W/O SCOPE: CPT | Performed by: PHYSICIAN ASSISTANT

## 2020-10-31 ENCOUNTER — TRANSFERRED RECORDS (OUTPATIENT)
Dept: HEALTH INFORMATION MANAGEMENT | Facility: CLINIC | Age: 30
End: 2020-10-31

## 2020-10-31 LAB
CREAT SERPL-MCNC: 0.91 MG/DL (ref 0.57–1.11)
GFR SERPL CREATININE-BSD FRML MDRD: >60 ML/MIN/1.73M(2)
GLUCOSE SERPL-MCNC: 90 MG/DL (ref 70–105)
POTASSIUM SERPL-SCNC: 4 MMOL/L (ref 3.5–5.1)

## 2020-11-02 ENCOUNTER — MYC REFILL (OUTPATIENT)
Dept: FAMILY MEDICINE | Facility: OTHER | Age: 30
End: 2020-11-02

## 2020-11-02 ENCOUNTER — MYC MEDICAL ADVICE (OUTPATIENT)
Dept: FAMILY MEDICINE | Facility: OTHER | Age: 30
End: 2020-11-02

## 2020-11-02 DIAGNOSIS — F32.0 MILD MAJOR DEPRESSION (H): ICD-10-CM

## 2020-11-02 DIAGNOSIS — F41.9 ANXIETY: ICD-10-CM

## 2020-11-02 NOTE — RESULT ENCOUNTER NOTE
Savana Burns    Your results were negative for UTI, BV, or yeast infection.     The results are attached for your review.       Zach Rosario PA-C

## 2020-11-02 NOTE — TELEPHONE ENCOUNTER
There is nothing I can do for her C.Diff. She needs to eat bland foods and push fluids. If she also has a sinus infection, the antibiotic for the C.Diff will clear it out. Recommend symptomatic cares.     Zach Rosario PA-C  [facility]

## 2020-11-02 NOTE — TELEPHONE ENCOUNTER
Called to discuss with the patient.     Informed the patient that we have to maintain her bi-weekly scheduled appointment with Zach. Patient is wanting to be seen today due to being in the ED for cdiff over the weekend. (CarmenaCakristy). I told her we should keep her scheduled appointment on 11/4/20 and writer will discuss further with Zach.     Discussed the patient's sinuses.   Patient states that she is coughing up green sputum with a slight tinge of blood. Her noes is stuff and she is blowing out green mucus, nose feels rad and appears red. Denies having a fever. States that she does have a  Sore throat. Denies wheezing or facial pain. Does describe a headache that is global.     Patient has tried a sinus rinse and tylenol for pain.       PLAN:   Huddle with provider.     Jose Finney RN  November 2, 2020

## 2020-11-02 NOTE — TELEPHONE ENCOUNTER
Pending Prescriptions:                       Disp   Refills    clonazePAM (KLONOPIN) 0.5 MG tablet        28 tab*0        Sig: Take 1 tablet (0.5 mg) by mouth 2 times daily as           needed for anxiety        Routing refill request to provider for review/approval because:  Drug not on the [facility] refill protocol   Last Seen: 10/12/20  Last Refilled: 10/20/20 28Tablets/Capsules  Refills: 0  Next Visit: 11/04/20  Jose Finney RN  November 2, 2020

## 2020-11-03 ENCOUNTER — TRANSFERRED RECORDS (OUTPATIENT)
Dept: HEALTH INFORMATION MANAGEMENT | Facility: CLINIC | Age: 30
End: 2020-11-03

## 2020-11-03 ENCOUNTER — TELEPHONE (OUTPATIENT)
Dept: FAMILY MEDICINE | Facility: OTHER | Age: 30
End: 2020-11-03

## 2020-11-03 RX ORDER — CLONAZEPAM 0.5 MG/1
0.5 TABLET ORAL 2 TIMES DAILY PRN
Qty: 28 TABLET | Refills: 0 | Status: SHIPPED | OUTPATIENT
Start: 2020-11-03 | End: 2020-11-16

## 2020-11-03 NOTE — TELEPHONE ENCOUNTER
Called to discuss with patient.     Patient stated that she is feeling very anxious since she woke up. She has been trying deep breathing and that didn't seem to be working. We did some deep breathing together, but still there was no change.   Patient has an appointment with [facility] in [place] and was about to leave the house.     Patient started her Vancomycin this AM.     Patient bought a BP machine and when she went to use it she gets an error message. Writer informed her that it is okay for her to be added to the RN schedule in [place] today. Writer can assist her with trying to figure out the machine. She thinks she may have threw away the manual. We discussed it possibly needing to be calibrated.     PLAN:   Patient will bring her BP machine to [facility]. If they are unable to help her there, then the patient may call or come into the clinic.   Okay, for patient to be added to the [place] RN schedule with Jose.     Jose Finney RN  November 3, 2020

## 2020-11-03 NOTE — TELEPHONE ENCOUNTER
Reason for Call:  Other call back    Detailed comments: patient called and is wanting to speak with a nurse for her BP she is trying to do it at home but her machine keeps giving her an error read and she is anxious. Please advise thank you    Phone Number Patient can be reached at: Home number on file [phone] (home)    Best Time: anytime    Can we leave a detailed message on this number? YES    Call taken on 11/3/2020 at 11:57 AM by Yahaira Watson
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
## 2020-11-04 ENCOUNTER — VIRTUAL VISIT (OUTPATIENT)
Dept: FAMILY MEDICINE | Facility: OTHER | Age: 30
End: 2020-11-04
Payer: MEDICARE

## 2020-11-04 DIAGNOSIS — K50.113 CROHN'S DISEASE OF COLON WITH FISTULA (H): ICD-10-CM

## 2020-11-04 DIAGNOSIS — F31.63 BIPOLAR DISORDER, CURRENT EPISODE MIXED, SEVERE, WITHOUT PSYCHOTIC FEATURES (H): ICD-10-CM

## 2020-11-04 DIAGNOSIS — G89.29 CHRONIC BILATERAL LOW BACK PAIN WITH LEFT-SIDED SCIATICA: ICD-10-CM

## 2020-11-04 DIAGNOSIS — F32.0 MILD MAJOR DEPRESSION (H): ICD-10-CM

## 2020-11-04 DIAGNOSIS — M54.42 CHRONIC BILATERAL LOW BACK PAIN WITH LEFT-SIDED SCIATICA: ICD-10-CM

## 2020-11-04 DIAGNOSIS — F43.23 ADJUSTMENT DISORDER WITH MIXED ANXIETY AND DEPRESSED MOOD: ICD-10-CM

## 2020-11-04 DIAGNOSIS — A04.72 C. DIFFICILE COLITIS: Primary | ICD-10-CM

## 2020-11-04 PROCEDURE — 99442 PR PHYSICIAN TELEPHONE EVALUATION 11-20 MIN: CPT | Mod: 95 | Performed by: PHYSICIAN ASSISTANT

## 2020-11-05 ENCOUNTER — MYC MEDICAL ADVICE (OUTPATIENT)
Dept: FAMILY MEDICINE | Facility: OTHER | Age: 30
End: 2020-11-05

## 2020-11-06 NOTE — TELEPHONE ENCOUNTER
She reports her abdominal pain has gone away, and that has drastically improved. Cough is improving as well with less phlegm production.     Patient reports having concern over previous chest CT that had mention of possible stage 1 lung cancer. I looked at two most recent CT scans of lungs she had completed and did not see any mention of this. Recommended she see her PCP to discuss as patient did report she has a screen shot of the report, that may be older than I can see. If she has worsening complaints she should be seen sooner.    Through  she acknowledge agreement.    Gurmeet Smith, RN, BSN

## 2020-11-06 NOTE — TELEPHONE ENCOUNTER
Lm for patient to return my call. She didn't read my [employer] message from yesterday.   Please transfer patient to writer if she returns my call.   Jose Finney RN  November 6, 2020

## 2020-11-09 ENCOUNTER — TELEPHONE (OUTPATIENT)
Dept: FAMILY MEDICINE | Facility: OTHER | Age: 30
End: 2020-11-09

## 2020-11-09 NOTE — TELEPHONE ENCOUNTER
Left message to return call to the clinic. Please transfer patient to MAUREEN Garcia. (In [place] until 4:30)    Jose Finney RN  November 9, 2020

## 2020-11-09 NOTE — TELEPHONE ENCOUNTER
Reason for call:  Patient reporting a symptom    Symptom or request: was put on antibiotics and still having green mucus and some blood in it.     Duration (how long have symptoms been present):     Have you been treated for this before? Yes    Additional comments: please call patient to follow up    Phone Number patient can be reached at:  Home number on file [phone] (home)    Best Time:  any    Can we leave a detailed message on this number:  YES    Call taken on 11/9/2020 at 12:54 PM by Kenzie Waters

## 2020-11-10 ENCOUNTER — MYC MEDICAL ADVICE (OUTPATIENT)
Dept: FAMILY MEDICINE | Facility: OTHER | Age: 30
End: 2020-11-10

## 2020-11-10 NOTE — TELEPHONE ENCOUNTER
Continue her C.Diff antibiotics. Should clear up sinus and skin infection. Should put warm packs on the skin infection. I'm sure the vancomycin is working just fine.     CT results - Atelectasis is when a small part of lung collapses. This is normal if you aren't taking deep breaths because of pain. There is no treatment except to work on taking slow deep breaths. No follow up needed.     Zach Rosario PA-C  [facility]

## 2020-11-10 NOTE — TELEPHONE ENCOUNTER
Responded to kathe. Zach, please review and advise. At the time I sent the message patient did not respond back.     Jose Finney RN  November 10, 2020

## 2020-11-10 NOTE — TELEPHONE ENCOUNTER
Called to discuss Zach's message below. Writer informed the patient of the first portion of the message below. Then the patient stated that she feels she may need to get a different doctor and hung up.       Was unable to inform her of her CT results.   Jose Finney RN  November 10, 2020

## 2020-11-12 ENCOUNTER — TELEPHONE (OUTPATIENT)
Dept: FAMILY MEDICINE | Facility: OTHER | Age: 30
End: 2020-11-12

## 2020-11-12 DIAGNOSIS — J01.90 ACUTE SINUSITIS WITH SYMPTOMS > 10 DAYS: ICD-10-CM

## 2020-11-12 DIAGNOSIS — J01.90 ACUTE SINUSITIS TREATED WITH ANTIBIOTICS IN THE PAST 60 DAYS: Primary | ICD-10-CM

## 2020-11-12 RX ORDER — AZITHROMYCIN 250 MG/1
TABLET, FILM COATED ORAL
Qty: 6 TABLET | Refills: 0 | Status: SHIPPED | OUTPATIENT
Start: 2020-11-12 | End: 2020-12-07

## 2020-11-12 NOTE — TELEPHONE ENCOUNTER
Sent patient a MyChart with Miralax clean out for her constipation and sent her a Zpack for the sinus infection.    Zach Rosario PA-C  [facility]

## 2020-11-12 NOTE — TELEPHONE ENCOUNTER
Patient returned call and was transfer to writer.     Patient feels like the provider needs to take this seriously. She has been taking and still is taking Vancomycin. Patient feels like her nasal congestion is worse from starting the vancomycin. States she is coughing up dark brown mucus with quarter size blood specks in her mucus. States she has abdominal pain 6/10 and has not had a BM for the last week. Yesterday she felt like she had chills, but denied fever.    Writer informed her that the Vancomycin is so strong that it should treat any type of infection she has. Writer also explained that common colds can cause congestion and can't be treated with medication.   Writer didn't provide any education on constipation, but will when call is returned.       PLAN:   Will huddle with provider.     Jose Finney RN  November 12, 2020

## 2020-11-12 NOTE — TELEPHONE ENCOUNTER
Reason for Call:  Other appointment    Detailed comments: Patient calling wanting to schedule an appt. Patient states she hasn't had a bowl movement and is coughing up colorful mucus. Please advise.    Phone Number Patient can be reached at: Cell number on file:    Telephone Information:   Mobile [phone]       Best Time: Any    Can we leave a detailed message on this number? YES    Call taken on 11/12/2020 at 9:28 AM by Sally Haro

## 2020-11-12 NOTE — TELEPHONE ENCOUNTER
LM for patient to return call. Please transfer patient to MAUREEN Garcia if the patient returns call.   If writer is unavailable. RN team please address the patient's concern. Prior to scheduling anything we need to discuss with Zach.     Jose Finney RN  November 12, 2020

## 2020-11-13 ENCOUNTER — TELEPHONE (OUTPATIENT)
Dept: FAMILY MEDICINE | Facility: OTHER | Age: 30
End: 2020-11-13

## 2020-11-13 ENCOUNTER — TRANSFERRED RECORDS (OUTPATIENT)
Dept: HEALTH INFORMATION MANAGEMENT | Facility: CLINIC | Age: 30
End: 2020-11-13

## 2020-11-13 LAB
ALT SERPL-CCNC: 108 U/L (ref 8–45)
AST SERPL-CCNC: 55 U/L (ref 5–41)
CREAT SERPL-MCNC: 1.09 MG/DL (ref 0.57–1.11)
GFR SERPL CREATININE-BSD FRML MDRD: >60 ML/MIN/1.73M2
GLUCOSE SERPL-MCNC: 108 MG/DL (ref 70–105)
POTASSIUM SERPL-SCNC: 3.2 MMOL/L (ref 3.5–5.1)

## 2020-11-13 NOTE — TELEPHONE ENCOUNTER
Called the patient back regarding her symptoms. Discussed with Kat Trimble as the PCP is unavailable.     Left detailed message. Informing patient should be re-evaluated in the ED due to her current symptoms. If the patient calls back please inform her that there are services at the hospital that can help her get insurance.     Jose Finney RN  November 13, 2020

## 2020-11-13 NOTE — TELEPHONE ENCOUNTER
Starting yesterday evening the patient started to be painful and rated 7/10. She has had diarrhea, mucus, and blood (1/4 cup)  Patient is done with her Vancomycin tomorrow. She has been eating lots of yogurt. Patient denies fevers.   Patient started talking about not having  Money or insurance. I tried to talk to her about how to apply for [employer] care.   Patient was informed of message from yesterday. Patient didn't  the ZPACK, but didn't pick that up. She was warn it may make her CDIF worse.     PLAN:   Huddle with provider.   Jose Finney RN  November 13, 2020

## 2020-11-16 DIAGNOSIS — F32.0 MILD MAJOR DEPRESSION (H): ICD-10-CM

## 2020-11-16 DIAGNOSIS — F41.9 ANXIETY: ICD-10-CM

## 2020-11-16 RX ORDER — CLONAZEPAM 0.5 MG/1
0.5 TABLET ORAL 2 TIMES DAILY PRN
Qty: 28 TABLET | Refills: 0 | Status: SHIPPED | OUTPATIENT
Start: 2020-11-17 | End: 2020-11-30

## 2020-11-16 NOTE — TELEPHONE ENCOUNTER
Called to discuss with patient. Patient stated that she is feeling tired and she was resting prior to writer calling. She stated that her symptoms have cleared up since Friday. I informed her that if her symptoms worsen to what they were below she needs to be re-evaluated in the ED.     PLAN:   Patient will call me back if her symptoms worsen. If symptoms develop similar to below the patient will go to ED.     Jose Finney RN  November 16, 2020

## 2020-11-16 NOTE — TELEPHONE ENCOUNTER
clonazePAM (KLONOPIN) 0.5 MG tablet 28 tablet 0 11/3/2020  No   Sig - Route: Take 1 tablet (0.5 mg) by mouth 2 times daily as needed for anxiety - Oral   Sent to pharmacy as: clonazePAM 0.5 MG Oral Tablet (klonoPIN)   Class: E-Prescribe     Last Seen: 11/04/20  Last Refilled: 11/03/20 28Tablets/Capsules  Refills: 0  Next Visit: 11/18/20  Jose Finney RN  November 16, 2020

## 2020-11-17 ENCOUNTER — MYC MEDICAL ADVICE (OUTPATIENT)
Dept: FAMILY MEDICINE | Facility: OTHER | Age: 30
End: 2020-11-17

## 2020-11-19 ENCOUNTER — TELEPHONE (OUTPATIENT)
Dept: FAMILY MEDICINE | Facility: OTHER | Age: 30
End: 2020-11-19

## 2020-11-19 NOTE — TELEPHONE ENCOUNTER
LM for the patient to return call to the clinic.   Please transfer to any triage RN.     Jose Finney RN  October 21, 2020

## 2020-11-19 NOTE — TELEPHONE ENCOUNTER
Reason for call:  Patient reporting a symptom    Symptom or request: fingers swollen and hands peeling    Duration (how long have symptoms been present): this morning    Have you been treated for this before? No    Additional comments: Please call to advise    Phone Number patient can be reached at:  Home number on file [phone] (home)    Best Time:  any    Can we leave a detailed message on this number:  YES    Call taken on 11/19/2020 at 2:12 PM by Kenzie Waters

## 2020-11-20 ENCOUNTER — NURSE TRIAGE (OUTPATIENT)
Dept: FAMILY MEDICINE | Facility: OTHER | Age: 30
End: 2020-11-20

## 2020-11-20 NOTE — TELEPHONE ENCOUNTER
Via interpretor patient was calling about recent lab results from [facility] emergency department visit. Currently I cannot see any lab results from [facility] or recent visits. Recommended she contact [facility] for lab results.    Gurmeet Smith RN, BSN

## 2020-11-20 NOTE — TELEPHONE ENCOUNTER
LJ Serrano.    Writer called to discuss issue below with the patient. Patient stated that her hands are better today and she has no concerns.     She was in the ED either two days ago or yesterday through [facility] for diarrhea and vomiting. They took some stool tests to test for CDIFF again. If it is positive another round of Vancomycin will be prescribed. Writer informed her that we can't see those results and I gave her the number to the hospital to try to reach someone who can. Patient feels exteremly fatigued. Writer informed her to continue to rest as much as possible. Patient was thankful for the call.     Jose Finney RN  November 20, 2020

## 2020-11-24 ENCOUNTER — TELEPHONE (OUTPATIENT)
Dept: FAMILY MEDICINE | Facility: OTHER | Age: 30
End: 2020-11-24

## 2020-11-24 NOTE — TELEPHONE ENCOUNTER
Spoke with patient.  States that she is having issues with her right had possible a nerve?.  Started today.  Wrist pain.  Looks a little swollen.  No injury. No over use that she can think of.     Heat: helped and a wrap: did not    Advised to continue using heat, rest and OTC and elevation  Follow up and next appointment if not improving.     Discussed diarrhea as well and recent antibiotic use.  Advised in increase fluids, fiber and yogurt and or probiotic.  Follow up at next visit if not improving.     Call back with any further questions or concerns.    Iggy Bernstein, EDYTAN, RN, PHN

## 2020-11-24 NOTE — TELEPHONE ENCOUNTER
Tried calling no answer, no machine.      Sent [employer] message.    Iggy Bernstein, EDYTAN, RN, PHN

## 2020-11-27 NOTE — PROGRESS NOTES
"Katy Islas is a 30 year old female who is being evaluated via a billable telephone visit.      The patient has been notified of following:     \"This telephone visit will be conducted via a call between you and your physician/provider. We have found that certain health care needs can be provided without the need for a physical exam.  This service lets us provide the care you need with a short phone conversation.  If a prescription is necessary we can send it directly to your pharmacy.  If lab work is needed we can place an order for that and you can then stop by our lab to have the test done at a later time.    Telephone visits are billed at different rates depending on your insurance coverage. During this emergency period, for some insurers they may be billed the same as an in-person visit.  Please reach out to your insurance provider with any questions.    If during the course of the call the physician/provider feels a telephone visit is not appropriate, you will not be charged for this service.\"    Patient has given verbal consent for Telephone visit?  Yes    What phone number would you like to be contacted at? [phone]    How would you like to obtain your AVS? Billie    Subjective     Katy Islas is a 30 year old female who presents via phone visit today for the following health issues:    HPI  Lab review & GI concerns   - Pt is concerned her ALT and A1c are high   - Diarrhea      Lot better now, finished medication      Taking Protonix & Zofran, taking Zofran 30 min before eatings       No appetite       Lost 20 lbs   - Drinking a lot of fluids, mainly propel   - LM with GI team, will try to call again   - Was on Remicade in the past, was triggering psoriasis so stopped it   - Mom has hepatitis  - Was drinking pretty heavily for a month, then stopped, then relapsed, then hasn't drank since April        - Sinus infection now improved     Back pain   - Much better   - Very frustrated and that was " expressed in other messages   - No medications, just spinal cord stimulator in place       Concern - Growth on leg   Description: seems to have resolved itself. Pt thinks maybe she bumped her right leg on something resulting in a raised bruise.       Review of Systems   Constitutional, HEENT, cardiovascular, pulmonary, gi and gu systems are negative, except as otherwise noted.       Objective      Vitals:  No vitals were obtained today due to virtual visit.    healthy, alert and no distress  PSYCH: Alert and oriented times 3; coherent speech, normal   rate and volume, able to articulate logical thoughts, able   to abstract reason, no tangential thoughts, no hallucinations   or delusions  Her affect is normal  RESP: No cough, no audible wheezing, able to talk in full sentences  Remainder of exam unable to be completed due to telephone visits    Diagnostics  Reviewed in Care Everywhere and in Epic     Assessment & Plan     ICD-10-CM    1. Crohn's disease of colon with fistula (H)  K50.113 GASTROENTEROLOGY ADULT REF CONSULT ONLY     GASTROENTEROLOGY ADULT REF CONSULT ONLY   2. C. difficile colitis  A04.72 GASTROENTEROLOGY ADULT REF CONSULT ONLY     GASTROENTEROLOGY ADULT REF CONSULT ONLY   3. Gastroesophageal reflux disease without esophagitis  K21.9 GASTROENTEROLOGY ADULT REF CONSULT ONLY     GASTROENTEROLOGY ADULT REF CONSULT ONLY   4. Elevated liver function tests  R79.89 Hepatic panel (Albumin, ALT, AST, Bili, Alk Phos, TP)     Hepatitis C antibody     Hepatitis Antibody A IgG     Hepatitis B Surface Antibody   5. Encounter for screening for other viral diseases   Z11.59 Hepatitis B Surface Antibody     GI Issues & Elevated LFTs    - Patient with complicated GI history including Crohn's and recent C.Diff infection   - Labs done at [facility] 11/19/20 showed - , , and alk phos of 197   - Discussed possible etiology including infection, GB etiology, dehydration, medications, tumor, and others  -  Discussed likely her etiology is both medication and dehydration as was having a lot of diarrhea from recent C.Diff      Patient had cholecystectomy in 2008 making GB etiology not likely   - Reviewed lots of recent imaging - CT scan 6/2020 outside of Epic, normal, 9/21/20 was also normal, CT scan 10/24/20 - normal   - Reviewed labs, previously all hepatic functions were normal including most recent at labs done 9/28/20  - Has had Hep B vaccination, no unprotected sex making Hep B and Hep C unlikely but possible  - Recommend we re-check liver functions every 2 weeks until normalized, will also get Hepatitis labs   - Await results   - Discussed if remain elevated, will get liver ultrasound   - Regarding her GI issues, she continues to reach out to PCP when I am not able to give her the care she needs. She has not had GI follow-up and reports doesn't want to go back to [place]      Discussed what she needs is medication for her Crohn's. Reports she was on Remicade in the past but had to stop as it gave her bad psoriasis      Discussed that she needs to discuss medication with a GI provider for her Crohn's   - Will give her a referral to [facility] and she is also requesting a referral to [place]    - As previous, we will continue to meet every 2 weeks, though this may be delayed due to the holidays     The patient indicates understanding of these issues and agrees with the plan.    Return in about 2 weeks (around 12/16/2020).    [facility]    Phone call duration:  25 minutes

## 2020-11-30 ENCOUNTER — MYC MEDICAL ADVICE (OUTPATIENT)
Dept: FAMILY MEDICINE | Facility: OTHER | Age: 30
End: 2020-11-30

## 2020-11-30 ENCOUNTER — TELEPHONE (OUTPATIENT)
Dept: FAMILY MEDICINE | Facility: OTHER | Age: 30
End: 2020-11-30

## 2020-11-30 DIAGNOSIS — M79.7 FIBROMYALGIA: ICD-10-CM

## 2020-11-30 DIAGNOSIS — F32.0 MILD MAJOR DEPRESSION (H): ICD-10-CM

## 2020-11-30 DIAGNOSIS — I89.0 LYMPHEDEMA: ICD-10-CM

## 2020-11-30 DIAGNOSIS — F41.9 ANXIETY: ICD-10-CM

## 2020-11-30 RX ORDER — METHOCARBAMOL 750 MG/1
TABLET, FILM COATED ORAL
Qty: 60 TABLET | Refills: 5 | Status: SHIPPED | OUTPATIENT
Start: 2020-11-30 | End: 2021-07-21

## 2020-11-30 RX ORDER — FUROSEMIDE 20 MG
20 TABLET ORAL DAILY PRN
Qty: 30 TABLET | Refills: 4 | Status: SHIPPED | OUTPATIENT
Start: 2020-11-30 | End: 2021-07-26

## 2020-11-30 RX ORDER — CLONAZEPAM 0.5 MG/1
0.5 TABLET ORAL 2 TIMES DAILY PRN
Qty: 28 TABLET | Refills: 0 | Status: SHIPPED | OUTPATIENT
Start: 2020-12-01 | End: 2020-12-14

## 2020-11-30 NOTE — TELEPHONE ENCOUNTER
Called to discuss with patient. Informed her we can cancel tomorrow's appointment and keep Wednesday appointment.     Jose Finney RN  November 30, 2020

## 2020-11-30 NOTE — TELEPHONE ENCOUNTER
Refills given. Not due for Klonopin until tomorrow.     Zach Rosario PA-C  [facility]

## 2020-11-30 NOTE — TELEPHONE ENCOUNTER
Zach, please advise refilling medications.     Methocarbamol (Robaxin)  Last Seen: 11/18/20  Last Refilled: 08/26/20 60Tablets/Capsules  Refills: 3  Next Visit: 12/2/20    Lasix  Last Seen: 11/18/20  Last Refilled: 09/22/20 30Tablets/Capsules  Refills: 4  Next Visit: 12/2/20      Klonopin  Last Seen: 11/18/20  Last Refilled: 11/17/20 28Tablets/Capsules  Refills: 4  Next Visit: JESÚS Finney RN  November 30, 2020

## 2020-11-30 NOTE — TELEPHONE ENCOUNTER
Medications have been requested and request sent to provider in different encounter.   Will close current encounter.   Jose Finney RN  November 30, 2020

## 2020-11-30 NOTE — TELEPHONE ENCOUNTER
Reason for Call:  Other appointment    Detailed comments: patient has appt tomorrow and Wednesday and she is wondering if she needs both?    Phone Number Patient can be reached at: Home number on file [phone] (home)    Best Time: any    Can we leave a detailed message on this number? YES    Call taken on 11/30/2020 at 9:12 AM by Kenzie Waters

## 2020-11-30 NOTE — TELEPHONE ENCOUNTER
Reason for Call:  Medication or medication refill:    Do you use a [facility]?  Name of the pharmacy and phone number for the current request:  [facility]    Name of the medication requested: robaxin water pill anxiety medication    Other request: patient states she requested this and it has not been done.    Can we leave a detailed message on this number? YES    Phone number patient can be reached at: Home number on file [phone] (home)    Best Time: any    Call taken on 11/30/2020 at 9:14 AM by Kenzie Waters

## 2020-12-01 NOTE — TELEPHONE ENCOUNTER
Responded to clyde Serrano please advise and respond to patient's concerns.   Jose Finney RN  December 1, 2020

## 2020-12-01 NOTE — TELEPHONE ENCOUNTER
MyChart message patient due to her being deaf. Relayed message below. Will close once I see [place] message is read.

## 2020-12-02 ENCOUNTER — VIRTUAL VISIT (OUTPATIENT)
Dept: FAMILY MEDICINE | Facility: OTHER | Age: 30
End: 2020-12-02
Payer: MEDICARE

## 2020-12-02 DIAGNOSIS — A04.72 C. DIFFICILE COLITIS: ICD-10-CM

## 2020-12-02 DIAGNOSIS — K21.9 GASTROESOPHAGEAL REFLUX DISEASE WITHOUT ESOPHAGITIS: ICD-10-CM

## 2020-12-02 DIAGNOSIS — K50.113 CROHN'S DISEASE OF COLON WITH FISTULA (H): Primary | ICD-10-CM

## 2020-12-02 DIAGNOSIS — R79.89 ELEVATED LIVER FUNCTION TESTS: ICD-10-CM

## 2020-12-02 DIAGNOSIS — Z11.59 ENCOUNTER FOR SCREENING FOR OTHER VIRAL DISEASES: ICD-10-CM

## 2020-12-02 PROCEDURE — 99443 PR PHYSICIAN TELEPHONE EVALUATION 21-30 MIN: CPT | Mod: 95 | Performed by: PHYSICIAN ASSISTANT

## 2020-12-02 NOTE — PATIENT INSTRUCTIONS
"  - Labs tomorrow at 3 pm           We will plan to check these every 2 weeks until resolved        If they don't improve, I will order a Ultrasound of your liver     - Possibly - we will have an appointment at 11 am on 12/15/20 at the clinic, this might get changed to virtual or canceled      If it does, I would like to see you in clinic the first week in January (week of January 4th)     - The GI team from the [facility] [place] will call you, if you have issues or want to get an appointment sooner you can call them at     Gastroenterology Clinic  [facility]  Floor 4  [address] [place] [id number]  Appointments: [phone]    - You can contact [facility] on their website or the number below              [email or web address] then click on \"Request an Appointment\"        [facility]  [phone]  7 a.m. to 6 p.m. Central time, Monday through Friday           "

## 2020-12-03 ENCOUNTER — TELEPHONE (OUTPATIENT)
Dept: FAMILY MEDICINE | Facility: OTHER | Age: 30
End: 2020-12-03

## 2020-12-03 ASSESSMENT — ASTHMA QUESTIONNAIRES: ACT_TOTALSCORE: 24

## 2020-12-03 NOTE — TELEPHONE ENCOUNTER
Reason for call:  Patient reporting a symptom    Symptom or request: body aches    Duration (how long have symptoms been present): today    Have you been treated for this before? No    Additional comments: declined making a appt, she would like to talk to a nurse    Phone Number patient can be reached at:      Best Time:      Can we leave a detailed message on this number:  NO    Call taken on 12/3/2020 at 3:42 PM by Atiya Borrego

## 2020-12-03 NOTE — PROGRESS NOTES
Clinic Administered Medication Documentation    MEDICATION LIST: Oral Medication Documentation    Patient was given Ondansetron (Zofran) . Prior to medication administration, verified patients identity using patient s name and date of birth. Please see MAR and medication order for additional information.     Was entire amount of medication used? Yes         Itraconazole Counseling:  I discussed with the patient the risks of itraconazole including but not limited to liver damage, nausea/vomiting, neuropathy, and severe allergy.  The patient understands that this medication is best absorbed when taken with acidic beverages such as non-diet cola or ginger ale.  The patient understands that monitoring is required including baseline LFTs and repeat LFTs at intervals.  The patient understands that they are to contact us or the primary physician if concerning signs are noted.

## 2020-12-04 NOTE — TELEPHONE ENCOUNTER
LM for the patient to return call to the clinic.   Please transfer to any triage RN.   Responded to [place] message.   Jose Finney RN  October 21, 2020

## 2020-12-07 ENCOUNTER — MYC MEDICAL ADVICE (OUTPATIENT)
Dept: FAMILY MEDICINE | Facility: OTHER | Age: 30
End: 2020-12-07

## 2020-12-07 ENCOUNTER — HOSPITAL ENCOUNTER (EMERGENCY)
Facility: CLINIC | Age: 30
Discharge: HOME | End: 2020-12-07
Attending: EMERGENCY MEDICINE | Admitting: EMERGENCY MEDICINE
Payer: MEDICARE

## 2020-12-07 ENCOUNTER — NURSE TRIAGE (OUTPATIENT)
Dept: FAMILY MEDICINE | Facility: OTHER | Age: 30
End: 2020-12-07

## 2020-12-07 ENCOUNTER — TELEPHONE (OUTPATIENT)
Dept: FAMILY MEDICINE | Facility: OTHER | Age: 30
End: 2020-12-07

## 2020-12-07 ENCOUNTER — OFFICE VISIT (OUTPATIENT)
Dept: INTERPRETER SERVICES | Facility: CLINIC | Age: 30
End: 2020-12-07
Payer: MEDICARE

## 2020-12-07 VITALS
SYSTOLIC BLOOD PRESSURE: 139 MMHG | BODY MASS INDEX: 37.03 KG/M2 | OXYGEN SATURATION: 97 % | HEART RATE: 90 BPM | TEMPERATURE: 99 F | WEIGHT: 196 LBS | DIASTOLIC BLOOD PRESSURE: 98 MMHG | RESPIRATION RATE: 16 BRPM

## 2020-12-07 DIAGNOSIS — R19.7 DIARRHEA, UNSPECIFIED TYPE: ICD-10-CM

## 2020-12-07 DIAGNOSIS — R10.84 ABDOMINAL PAIN, GENERALIZED: ICD-10-CM

## 2020-12-07 LAB
ALBUMIN SERPL-MCNC: 4.1 G/DL (ref 3.4–5)
ALBUMIN UR-MCNC: NEGATIVE MG/DL
ALP SERPL-CCNC: 120 U/L (ref 40–150)
ALT SERPL W P-5'-P-CCNC: 56 U/L (ref 0–50)
AMPHETAMINES UR QL SCN: NEGATIVE
ANION GAP SERPL CALCULATED.3IONS-SCNC: 4 MMOL/L (ref 3–14)
APPEARANCE UR: CLEAR
AST SERPL W P-5'-P-CCNC: 30 U/L (ref 0–45)
BARBITURATES UR QL: NEGATIVE
BASOPHILS # BLD AUTO: 0 10E9/L (ref 0–0.2)
BASOPHILS NFR BLD AUTO: 0.6 %
BENZODIAZ UR QL: NEGATIVE
BILIRUB SERPL-MCNC: 0.3 MG/DL (ref 0.2–1.3)
BILIRUB UR QL STRIP: NEGATIVE
BUN SERPL-MCNC: 12 MG/DL (ref 7–30)
C DIFF TOX B STL QL: NEGATIVE
CALCIUM SERPL-MCNC: 9.7 MG/DL (ref 8.5–10.1)
CANNABINOIDS UR QL SCN: POSITIVE
CHLORIDE SERPL-SCNC: 107 MMOL/L (ref 94–109)
CO2 SERPL-SCNC: 28 MMOL/L (ref 20–32)
COCAINE UR QL: NEGATIVE
COLOR UR AUTO: NORMAL
CREAT SERPL-MCNC: 0.77 MG/DL (ref 0.52–1.04)
CRP SERPL-MCNC: <2.9 MG/L (ref 0–8)
DIFFERENTIAL METHOD BLD: ABNORMAL
EOSINOPHIL # BLD AUTO: 0 10E9/L (ref 0–0.7)
EOSINOPHIL NFR BLD AUTO: 0.2 %
ERYTHROCYTE [DISTWIDTH] IN BLOOD BY AUTOMATED COUNT: 11.8 % (ref 10–15)
ETHANOL UR QL SCN: NEGATIVE
GFR SERPL CREATININE-BSD FRML MDRD: >90 ML/MIN/{1.73_M2}
GLUCOSE SERPL-MCNC: 99 MG/DL (ref 70–99)
GLUCOSE UR STRIP-MCNC: NEGATIVE MG/DL
HCG UR QL: NEGATIVE
HCT VFR BLD AUTO: 42.2 % (ref 35–47)
HGB BLD-MCNC: 14.8 G/DL (ref 11.7–15.7)
HGB UR QL STRIP: NEGATIVE
IMM GRANULOCYTES # BLD: 0 10E9/L (ref 0–0.4)
IMM GRANULOCYTES NFR BLD: 0.4 %
KETONES UR STRIP-MCNC: NEGATIVE MG/DL
LEUKOCYTE ESTERASE UR QL STRIP: NEGATIVE
LYMPHOCYTES # BLD AUTO: 1.4 10E9/L (ref 0.8–5.3)
LYMPHOCYTES NFR BLD AUTO: 26.2 %
MCH RBC QN AUTO: 33.6 PG (ref 26.5–33)
MCHC RBC AUTO-ENTMCNC: 35.1 G/DL (ref 31.5–36.5)
MCV RBC AUTO: 96 FL (ref 78–100)
MONOCYTES # BLD AUTO: 0.5 10E9/L (ref 0–1.3)
MONOCYTES NFR BLD AUTO: 9.7 %
NEUTROPHILS # BLD AUTO: 3.4 10E9/L (ref 1.6–8.3)
NEUTROPHILS NFR BLD AUTO: 62.9 %
NITRATE UR QL: NEGATIVE
NRBC # BLD AUTO: 0 10*3/UL
NRBC BLD AUTO-RTO: 0 /100
OPIATES UR QL SCN: NEGATIVE
PH UR STRIP: 5.5 PH (ref 5–7)
PLATELET # BLD AUTO: 273 10E9/L (ref 150–450)
POTASSIUM SERPL-SCNC: 3.7 MMOL/L (ref 3.4–5.3)
PROT SERPL-MCNC: 7.7 G/DL (ref 6.8–8.8)
RBC # BLD AUTO: 4.4 10E12/L (ref 3.8–5.2)
SODIUM SERPL-SCNC: 138 MMOL/L (ref 133–144)
SOURCE: NORMAL
SP GR UR STRIP: 1.01 (ref 1–1.03)
SPECIMEN SOURCE: NORMAL
UROBILINOGEN UR STRIP-MCNC: NORMAL MG/DL (ref 0–2)
WBC # BLD AUTO: 5.3 10E9/L (ref 4–11)

## 2020-12-07 PROCEDURE — 81003 URINALYSIS AUTO W/O SCOPE: CPT | Mod: XU | Performed by: EMERGENCY MEDICINE

## 2020-12-07 PROCEDURE — 250N000009 HC RX 250: Performed by: EMERGENCY MEDICINE

## 2020-12-07 PROCEDURE — 250N000011 HC RX IP 250 OP 636: Performed by: EMERGENCY MEDICINE

## 2020-12-07 PROCEDURE — 258N000003 HC RX IP 258 OP 636: Performed by: EMERGENCY MEDICINE

## 2020-12-07 PROCEDURE — 99284 EMERGENCY DEPT VISIT MOD MDM: CPT | Performed by: EMERGENCY MEDICINE

## 2020-12-07 PROCEDURE — 80320 DRUG SCREEN QUANTALCOHOLS: CPT | Performed by: EMERGENCY MEDICINE

## 2020-12-07 PROCEDURE — 81025 URINE PREGNANCY TEST: CPT | Performed by: EMERGENCY MEDICINE

## 2020-12-07 PROCEDURE — T1013 SIGN LANG/ORAL INTERPRETER: HCPCS | Mod: U3

## 2020-12-07 PROCEDURE — 86140 C-REACTIVE PROTEIN: CPT | Performed by: EMERGENCY MEDICINE

## 2020-12-07 PROCEDURE — 96374 THER/PROPH/DIAG INJ IV PUSH: CPT | Performed by: EMERGENCY MEDICINE

## 2020-12-07 PROCEDURE — 99285 EMERGENCY DEPT VISIT HI MDM: CPT | Mod: 25 | Performed by: EMERGENCY MEDICINE

## 2020-12-07 PROCEDURE — 85025 COMPLETE CBC W/AUTO DIFF WBC: CPT | Performed by: EMERGENCY MEDICINE

## 2020-12-07 PROCEDURE — 250N000013 HC RX MED GY IP 250 OP 250 PS 637: Performed by: EMERGENCY MEDICINE

## 2020-12-07 PROCEDURE — 96361 HYDRATE IV INFUSION ADD-ON: CPT | Performed by: EMERGENCY MEDICINE

## 2020-12-07 PROCEDURE — 80307 DRUG TEST PRSMV CHEM ANLYZR: CPT | Performed by: EMERGENCY MEDICINE

## 2020-12-07 PROCEDURE — 87493 C DIFF AMPLIFIED PROBE: CPT | Performed by: EMERGENCY MEDICINE

## 2020-12-07 PROCEDURE — 80053 COMPREHEN METABOLIC PANEL: CPT | Performed by: EMERGENCY MEDICINE

## 2020-12-07 RX ORDER — ACETAMINOPHEN 500 MG
1000 TABLET ORAL ONCE
Status: COMPLETED | OUTPATIENT
Start: 2020-12-07 | End: 2020-12-07

## 2020-12-07 RX ORDER — DIPHENHYDRAMINE HCL 50 MG
50 CAPSULE ORAL ONCE
Status: COMPLETED | OUTPATIENT
Start: 2020-12-07 | End: 2020-12-07

## 2020-12-07 RX ORDER — ONDANSETRON 2 MG/ML
4 INJECTION INTRAMUSCULAR; INTRAVENOUS ONCE
Status: COMPLETED | OUTPATIENT
Start: 2020-12-07 | End: 2020-12-07

## 2020-12-07 RX ADMIN — LIDOCAINE HYDROCHLORIDE 30 ML: 20 SOLUTION ORAL; TOPICAL at 15:31

## 2020-12-07 RX ADMIN — ACETAMINOPHEN 1000 MG: 500 TABLET, FILM COATED ORAL at 14:43

## 2020-12-07 RX ADMIN — SODIUM CHLORIDE 1000 ML: 9 INJECTION, SOLUTION INTRAVENOUS at 14:35

## 2020-12-07 RX ADMIN — DIPHENHYDRAMINE HYDROCHLORIDE 50 MG: 50 CAPSULE ORAL at 15:30

## 2020-12-07 RX ADMIN — ONDANSETRON 4 MG: 2 INJECTION INTRAMUSCULAR; INTRAVENOUS at 14:43

## 2020-12-07 ASSESSMENT — ENCOUNTER SYMPTOMS
FLANK PAIN: 0
HEADACHES: 0
DYSURIA: 0
ARTHRALGIAS: 0
ABDOMINAL PAIN: 1
HEMATURIA: 0
DIARRHEA: 1
NECK STIFFNESS: 0
FEVER: 1
DIFFICULTY URINATING: 0
BACK PAIN: 1
EYE REDNESS: 0
CONFUSION: 0
VOMITING: 1
NAUSEA: 1
FREQUENCY: 0
SHORTNESS OF BREATH: 0
COLOR CHANGE: 0

## 2020-12-07 NOTE — TELEPHONE ENCOUNTER
Called to discuss with the patient.     She states the stools are worse now than this AM. She took all her meds this AM and vomited them up. States her stools are a jelly consistency and she is going every 10 min. Do you want me to send her to the ED or have her continue to monitor? She states she is laying in bed. Decrease urine output. /101 P- 107.     PLAN:   Huddle with provider.   Patient is requesting labs for C-Diff and Hepatic Function.   Please advise ED or ordering labs.     Jose Finney RN  December 7, 2020

## 2020-12-07 NOTE — ED PROVIDER NOTES
"    [place] EMERGENCY DEPARTMENT ([facility])  December 7, 2020    ED 22 2:58 PM   History     Chief Complaint   Patient presents with     Diarrhea     The history is provided by the patient and medical records. A  was used (professional in person ).     Katy Islas is a 30 year old female with history of Crohn's disease and drug-seeking behavior with history of hearing impairment cholecystectomy and fistula removal who presents to the Emergency Department with diffuse abdominal pain, loose stools, nausea and vomiting over the past 3 days.  Patient was diagnosed with C. difficile through [facility] on 10/31/2020.   She since had repeat testing on 11/19 that was negative for C. Difficile. She was treated with vancomycin for this.  Patient states that this vancomycin was discontinued and she then developed recurrence of abdominal pain, \"diarrheal\" stools and low-grade temperatures.  Patient had a formed stool here and states that she is had these stools like this at home.  When noted that lab would probably not do C. difficile testing on formed stool, she states that she had prior positive C. diff testing even with formed stools.  She has had abdominal pain for the past 3 days, diffuse over entire abdomen. She has difficulty describing the nature of this pain, a little burning, a little sharp and worsened quite a bit since onset. The abdominal pain worsens with movement. She has had multiple episodes of diarrhea and starting to turn bloody. The stools are watery and yellow in color. She states that the stools are small but has had frequent stools (over 10 stools a day) and is running to the bathrooms constantly.  She states that she is unable to sleep due to this abdominal pain. She endorses fevers to 100.2  F since this morning. She denies change in voiding. Voids have been normal. She has had both nausea and vomiting with this. She doesn't know if she has had any " weight changes, does not own a scale. She thinks she has lost some weight. Has tried ibuprofen and Tylenol for her abdominal pain, did receive some Tylenol prior to evaluation and wonders if she could have something else to manage her pain in the interim.  She has had some pain in her back, but wonders if this is radiating from her abdomen.  She notes recent evaluation for back pain and was brought in by ambulance to [facility]. They felt that this was a pulled muscle and her back does feel better now. She is followed by [facility] pain clinic for this back pain, has appointment there tomorrow at 10:30. She gets electric stimulation or her back and this has been helpful.  She notes having had back surgery 2-3 months ago.  She is no longer on oxycodone, last dose was sometime around the end of October.      PAST MEDICAL HISTORY:   Past Medical History:   Diagnosis Date     Abdominal pain 10/31- 11/4/2005    [facility] admit for Crohn's     Allergic rhinitis, cause unspecified     Allergic rhinitis     Arthritis      C. difficile diarrhea     Past, no current diarrhea.     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, without mention of intractable epilepsy     pseudoseizures diagnosed after extensive neurologic eval     Migraine 07/21/12    D/C 07/22/12-[facility]     Migraine, unspecified, without mention of intractable migraine without mention of status migrainosus     Migraine     Mild intermittent asthma     mild intermittent     Mycoplasma infection in  conditions classified elsewhere and of unspecified site      Other chronic pain     Back pain for 6 years     Renal disease     Kidney stones     Unspecified hearing loss     congenital hearing loss       PAST SURGICAL HISTORY:   Past Surgical History:   Procedure Laterality Date     AS REMOVAL OF COCCYX  10/18/2017     C FUSION OF SACROILIAC JOINT  10/26/2018     COLONOSCOPY  7/1/2009    [facility].     COLONOSCOPY N/A 7/17/2019    Procedure: Colonoscopy, With Polypectomy And Biopsy;  Surgeon: Edwin Conde MD;  Location: [facility] OR     COLONOSCOPY WITH CO2 INSUFFLATION N/A 7/17/2019    Procedure: COLONOSCOPY, WITH CO2 INSUFFLATION;  Surgeon: Edwin Conde MD;  Location: [facility] OR     COMBINED ESOPHAGOSCOPY, GASTROSCOPY, DUODENOSCOPY (EGD) WITH CO2 INSUFFLATION N/A 4/12/2019    Procedure: COMBINED ESOPHAGOSCOPY, GASTROSCOPY, DUODENOSCOPY (EGD) WITH CO2 INSUFFLATION;  Surgeon: Edwin Conde MD;  Location: [facility] OR     ESOPHAGOSCOPY, GASTROSCOPY, DUODENOSCOPY (EGD), COMBINED N/A 4/12/2019    Procedure: Combined Esophagoscopy, Gastroscopy, Duodenoscopy (Egd), Biopsy Single Or Multiple;  Surgeon: Edwin Conde MD;  Location: [facility] OR     FISTULOTOMY RECTUM N/A 4/30/2020    Procedure: EXAM UNDER ANESTHESIA, ANUS, parital fistulotomy;  Surgeon: Valentin Sanchez MD;  Location: [facility] OR     FUSION SPINE POSTERIOR MINIMALLY INVASIVE ONE LEVEL N/A 2/23/2017    Procedure: FUSION SPINE POSTERIOR MINIMALLY INVASIVE ONE LEVEL;  L4-5 Oblique Lateral Lumbar Interbody Fusion   Epidural steroid injection.   Transpedicular Bone marrow aspiration;  Surgeon: Jeniffer Eugene MD;  Location: [facility] OR     HC COLONOSCOPY THRU STOMA WITH BIOPSY  10/29/2003    Impression is that of normal appearing colonoscopy, without evidence of rectal bleeding.     HC COLONOSCOPY THRU STOMA, DIAGNOSTIC  10/00    normal     HC COLONOSCOPY THRU STOMA, DIAGNOSTIC  Oct 2009    Dr López- sosa      HC EEG AWAKE AND SLEEP      abnormal     HC MRI BRAIN W/O CONTRAST  12/00    normal     HC REMOVAL GALLBLADDER  8/5/2009    [facility] [place]     HC UGI ENDOSCOPY DIAG W BIOPSY  11/11/09    Normal esophagus     HC UGI ENDOSCOPY, SIMPLE EXAM  7/00, 10/00    mild chronic esophagitis and duodenitis, neg H pylori     HC UGI ENDOSCOPY, SIMPLE EXAM  01/20/2005    Esophagogastroduodenoscopy, colonoscopy with biopsies.  [facility]     HC UGI ENDOSCOPY, SIMPLE EXAM  7/1/2009    [facility], [place].     HC UGI ENDOSCOPY, SIMPLE EXAM  11/11/2009    attempted upper GI, pt. could not tolerate procedure:[facility]     ORTHOPEDIC SURGERY  October 19,2011    diskectomy L4-L5       Past medical history, past surgical history, medications, and allergies were reviewed with the patient. Additional pertinent items: None    FAMILY HISTORY:   Family History   Problem Relation Age of Onset     Gastrointestinal Disease Brother         severe Crohn's     Neurologic Disorder Brother         Seizures post head injury     Depression Brother      Substance Abuse Brother      Genitourinary Problems Father         kidney stones     Diabetes Father      Heart Disease Father         Open heart surgery     Breast Cancer Maternal Grandmother      Parkinsonism Maternal Grandmother      Cerebrovascular Disease Paternal Grandmother      Cancer Maternal Grandfather         Lung     Cardiovascular Paternal Grandfather         Heart Attack     Substance Abuse Mother         in recovery x1 year      Lung Cancer Paternal Uncle      Cancer Paternal Uncle      Lung Cancer Maternal Uncle      Colon Cancer Maternal Uncle        SOCIAL HISTORY:   Social History     Tobacco Use     Smoking status: Current Every Day Smoker     Packs/day: 0.25     Years: 5.00     Pack years: 1.25     Types: Cigarettes     Smokeless tobacco: Never Used   Substance Use Topics     Alcohol use: Not Currently     Comment: Not since  last July 2018     Social history was reviewed with the patient. Additional pertinent items: None      Discharge Medication List as of 12/7/2020  4:23 PM      CONTINUE these medications which have NOT CHANGED    Details   acetaminophen (TYLENOL) 500 MG tablet Take 2 tablets (1,000 mg) by mouth 3 times daily as needed for mild pain, Disp-100 tablet, R-3, E-Prescribe      albuterol (PROAIR HFA/PROVENTIL HFA/VENTOLIN HFA) 108 (90 Base) MCG/ACT inhaler Inhale 2 puffs into the lungs every 6 hours as needed for shortness of breath / dyspnea or wheezing, Disp-3 Inhaler,R-3, E-PrescribePharmacy may dispense brand covered by insurance (Proair, or proventil or ventolin or generic albuterol inhaler)      albuterol (PROVENTIL) (2.5 MG/3ML) 0.083% neb solution Take 1 vial (2.5 mg) by nebulization every 6 hours as needed for shortness of breath / dyspnea or wheezing, Disp-50 vial,R-11, E-Prescribe      alum & mag hydroxide-simethicone (MAALOX ADVANCED MAX ST) 400-400-40 MG/5ML SUSP suspension Take 15 mLs by mouth every 4 hours as needed for indigestion (mix with lidocaine), Disp-355 mL, R-11, E-Prescribe      ARIPiprazole (ABILIFY) 30 MG tablet Take 1 tablet (30 mg) by mouth At Bedtime, Disp-30 tablet,R-1, E-Prescribe      aspirin (ASA) 81 MG tablet Take 1 tablet (81 mg) by mouth daily, Disp-90 tablet, R-3, E-Prescribe      azelastine (OPTIVAR) 0.05 % ophthalmic solution Place 1 drop into both eyes 2 times daily, Disp-6 mL, R-11, E-Prescribe      buPROPion (WELLBUTRIN XL) 300 MG 24 hr tablet Take 1 tablet (300 mg) by mouth every morning, Disp-90 tablet,R-1, E-Prescribe      busPIRone (BUSPAR) 15 MG tablet Take 1 tablet (15 mg) by mouth 2 times daily, Disp-60 tablet, R-5, E-Prescribe      carisoprodol (SOMA) 350 MG tablet Take 1 tablet (350 mg) by mouth 3 times daily as needed for muscle spasms, Disp-10 tablet,R-0, E-Prescribe      clonazePAM (KLONOPIN) 0.5 MG tablet Take 1 tablet (0.5 mg) by mouth 2 times daily as needed for  anxiety, Disp-28 tablet, R-0, E-Prescribe      cyclobenzaprine (FLEXERIL) 10 MG tablet Take 1 tablet (10 mg) by mouth 3 times daily as needed for muscle spasms or other (back pain), Disp-90 tablet,R-3, E-Prescribe      diclofenac (VOLTAREN) 1 % topical gel Place 2 g onto the skin 4 times daily as needed for moderate pain Stop if any gi upset or symptoms, Disp-100 g,R-0, E-Prescribe      dicyclomine (BENTYL) 20 MG tablet Take 1 tablet (20 mg) by mouth every 6 hours, Disp-90 tablet,R-3, E-Prescribe      diphenhydrAMINE (BENADRYL) 50 MG capsule Take 1-2 capsules ( mg) by mouth every 6 hours as needed for itching, allergies or sleep, Disp-90 capsule, R-3, E-Prescribe      DULoxetine (CYMBALTA) 60 MG EC capsule Take 1 capsule (60 mg) by mouth daily, Disp-90 capsule, R-3, E-Prescribe      EPINEPHrine (ANY BX GENERIC EQUIV) 0.3 MG/0.3ML injection 2-pack Inject 0.3 mLs (0.3 mg) into the muscle once as needed for anaphylaxis, Disp-0.6 mL,R-3, E-Prescribe      fexofenadine (ALLEGRA) 180 MG tablet Take 1 tablet (180 mg) by mouth daily, Disp-90 tablet, R-3, E-Prescribe      fluticasone (FLONASE) 50 MCG/ACT nasal spray Spray 1 spray into both nostrils daily, Disp-16 g,R-1, E-Prescribe      furosemide (LASIX) 20 MG tablet Take 1 tablet (20 mg) by mouth daily as needed (edema), Disp-30 tablet, R-4, E-Prescribe      gabapentin (NEURONTIN) 300 MG capsule Take 1 capsule (300 mg) by mouth 3 times daily, Disp-270 capsule,R-3, No Print Out      hydrochlorothiazide (HYDRODIURIL) 12.5 MG tablet Take 1 tablet (12.5 mg) by mouth daily, Disp-30 tablet,R-1, E-Prescribe      hyoscyamine (LEVSIN) 0.125 MG tablet Historical      lamoTRIgine (LAMICTAL) 25 MG tablet Historical      lidocaine (XYLOCAINE) 2 % solution Swish and swallow 15 mLs in mouth every 4 hours as needed for other (GERD) ; Max 8 doses/24 hour period. Mix with 15 mLs Maalox or Mylanta., Disp-100 mL, R-3, E-Prescribe      losartan (COZAAR) 100 MG tablet Take 1 tablet (100  mg) by mouth daily, Disp-90 tablet,R-3, E-Prescribe      medical cannabis (Patient's own supply) (The purpose of this order is to document that the patient reports taking medical cannabis.  This is not a prescription, and is not used to certify that the patient has a qualifying medical condition.), Historical      medroxyPROGESTERone (DEPO-PROVERA) 150 MG/ML IM injection Inject 1 mL (150 mg) into the muscle every 3 months, Disp-3 mL, R-3, Injection      !! methocarbamol (ROBAXIN) 750 MG tablet TAKE ONE TABLET BY MOUTH THREE TIMES A DAY AS NEEDED, Disp-60 tablet, R-5, E-Prescribe      !! methocarbamol (ROBAXIN) 750 MG tablet Take 1 tablet (750 mg) by mouth 3 times daily as needed for muscle spasms, Disp-60 tablet,R-3, E-Prescribe      NONFORMULARY Apply 30 mLs topically 4 times daily, Historical      nystatin (MYCOSTATIN) 121643 UNIT/ML suspension Take 5 mLs (500,000 Units) by mouth 4 times daily (swish and spit)Disp-400 mL,O-[id number]-[facility]      ondansetron (ZOFRAN-ODT) 8 MG ODT tab Take 1 tablet (8 mg) by mouth every 8 hours as needed for nausea, Disp-15 tablet,R-0, E-Prescribe      pantoprazole (PROTONIX) 40 MG EC tablet Take 1 tablet (40 mg) by mouth 2 times daily Take 30-60 minutes before a meal., Disp-180 tablet, R-3, E-Prescribe      polyethylene glycol (MIRALAX) 17 g packet Take 17 g by mouth 2 times daily, Disp-60 packet,R-0, E-Prescribe      prazosin (MINIPRESS) 1 MG capsule Take 1 mg by mouth At Bedtime, R-5, Historical      prochlorperazine (COMPAZINE) 10 MG tablet Take 1 tablet (10 mg) by mouth every 6 hours as needed for nausea or vomiting, Disp-60 tablet,R-11, E-Prescribe      rizatriptan (MAXALT-MLT) 5 MG ODT Take 1-2 tablets (5-10 mg) by mouth at onset of headache for migraine May repeat in 2 hours. Max 6 tablets/24 hours., Disp-18 tablet, R-3, E-Prescribe      sucralfate (CARAFATE) 1 GM tablet Take 1 tablet (1 g) by mouth 4 times daily as needed (heartburn), Disp-360 tablet, R-3, E-Prescribe       valACYclovir (VALTREX) 1000 mg tablet 2 tabs at onset of mouth sores.  Repeat dose in 12 hours., Historical       !! - Potential duplicate medications found. Please discuss with provider.             Allergies   Allergen Reactions     Iohexol Hives     Other reaction(s): Hives  IV contrast; patient states she tolerates contrast if she gets benadryl before  IV contrast; patient states she tolerates contrast if she gets benadryl before     Ativan [Lorazepam] Hives     At the IV site     Baclofen      hives     Bees Hives, Swelling and Difficulty breathing     Caffeine      Contrast Dye      Hives,   Updated 5/10/2016 CT Contrast.     Dilaudid [Hydromorphone] Itching     Iodine Hives     Methocarbamol Swelling     Metoclopramide      Other reaction(s): Tremors  LW Reaction: shaking/sweating     Midazolam      Other reaction(s): Agitation     Monosodium Glutamate      Morphine Other (See Comments)     Difficulty with urination     Nsaids Other (See Comments)     GI BLEED x2     Other (Do Not Use) Other (See Comments)     Xanaflex- pt becomes disoriented and loses bladder control     Reglan [Metoclopramide Hcl] Other (See Comments)     shaking     Soma [Carisoprodol] Visual Disturbance     Sleep walking     Tizanidine      Topamax Other (See Comments)     Topamax [Topiramate] Nausea     Tingling  GI/Vomit     Tramadol      Severe Headache, Seizure Risk     Tylenol W/Codeine [Acetaminophen-Codeine] Nausea and Itching     Tylenol 3     Versed Other (See Comments)     Zolmitriptan      Makes face feel like its twitching     Droperidol Anxiety     Flu Virus Vaccine Rash      Arm swelling        Review of Systems   Constitutional: Positive for fever.   HENT: Negative for congestion.    Eyes: Negative for redness.   Respiratory: Negative for shortness of breath.    Cardiovascular: Negative for chest pain.   Gastrointestinal: Positive for abdominal pain, diarrhea, nausea and vomiting.   Genitourinary: Negative for difficulty  urinating, dysuria, flank pain, frequency, hematuria and urgency.   Musculoskeletal: Positive for back pain. Negative for arthralgias and neck stiffness.   Skin: Negative for color change.   Neurological: Negative for headaches.   Psychiatric/Behavioral: Negative for confusion.   All other systems reviewed and are negative.    A complete review of systems was performed with pertinent positives and negatives noted in the HPI, and all other systems negative.    Physical Exam   BP: (!) 153/83  Pulse: 139  Temp: 99  F (37.2  C)  Resp: 20  Weight: 88.9 kg (196 lb)  SpO2: 100 %      Physical Exam  Constitutional:       General: She is not in acute distress.     Appearance: She is not diaphoretic.   HENT:      Head: Atraumatic.      Mouth/Throat:      Pharynx: No oropharyngeal exudate.   Eyes:      General: No scleral icterus.     Pupils: Pupils are equal, round, and reactive to light.   Cardiovascular:      Heart sounds: Normal heart sounds.   Pulmonary:      Effort: No respiratory distress.      Breath sounds: Normal breath sounds.   Abdominal:      General: Bowel sounds are normal.      Palpations: Abdomen is soft.      Tenderness: There is no abdominal tenderness.   Musculoskeletal:         General: No tenderness.   Skin:     General: Skin is warm.      Findings: No rash.         ED Course        Procedures             EKG Interpretation:      Interpreted by Josue Callahan MD  Time reviewed: 2:15 PM  Symptoms at time of EKG: Diarrhea  Rhythm: normal sinus   Rate: normal  Axis: normal  Ectopy: none  Conduction: normal  ST Segments/ T Waves: No ST-T wave changes  Q Waves: none  Comparison to prior: Unchanged from April 27, 2020    Clinical Impression: normal EKG                        Results for orders placed or performed during the hospital encounter of 12/07/20 (from the past 24 hour(s))   EKG 12-lead, tracing only   Result Value Ref Range    Interpretation ECG Click View Image link to view waveform and result    UA  reflex to Microscopic and Culture    Specimen: Urine Midstream; Midstream Urine   Result Value Ref Range    Color Urine Light Yellow     Appearance Urine Clear     Glucose Urine Negative NEG^Negative mg/dL    Bilirubin Urine Negative NEG^Negative    Ketones Urine Negative NEG^Negative mg/dL    Specific Gravity Urine 1.010 1.003 - 1.035    Blood Urine Negative NEG^Negative    pH Urine 5.5 5.0 - 7.0 pH    Protein Albumin Urine Negative NEG^Negative mg/dL    Urobilinogen mg/dL Normal 0.0 - 2.0 mg/dL    Nitrite Urine Negative NEG^Negative    Leukocyte Esterase Urine Negative NEG^Negative    Source Midstream Urine    HCG qualitative urine (UPT)   Result Value Ref Range    HCG Qual Urine Negative NEG^Negative   Drug abuse screen 6 urine (chem dep) ([facility])   Result Value Ref Range    Amphetamine Qual Urine Negative NEG^Negative    Barbiturates Qual Urine Negative NEG^Negative    Benzodiazepine Qual Urine Negative NEG^Negative    Cannabinoids Qual Urine Positive (A) NEG^Negative    Cocaine Qual Urine Negative NEG^Negative    Ethanol Qual Urine Negative NEG^Negative    Opiates Qualitative Urine Negative NEG^Negative   CBC with platelets differential   Result Value Ref Range    WBC 5.3 4.0 - 11.0 10e9/L    RBC Count 4.40 3.8 - 5.2 10e12/L    Hemoglobin 14.8 11.7 - 15.7 g/dL    Hematocrit 42.2 35.0 - 47.0 %    MCV 96 78 - 100 fl    MCH 33.6 (H) 26.5 - 33.0 pg    MCHC 35.1 31.5 - 36.5 g/dL    RDW 11.8 10.0 - 15.0 %    Platelet Count 273 150 - 450 10e9/L    Diff Method Automated Method     % Neutrophils 62.9 %    % Lymphocytes 26.2 %    % Monocytes 9.7 %    % Eosinophils 0.2 %    % Basophils 0.6 %    % Immature Granulocytes 0.4 %    Nucleated RBCs 0 0 /100    Absolute Neutrophil 3.4 1.6 - 8.3 10e9/L    Absolute Lymphocytes 1.4 0.8 - 5.3 10e9/L    Absolute Monocytes 0.5 0.0 - 1.3 10e9/L    Absolute Eosinophils 0.0 0.0 - 0.7 10e9/L    Absolute Basophils 0.0 0.0 - 0.2 10e9/L    Abs Immature Granulocytes 0.0 0 - 0.4 10e9/L     Absolute Nucleated RBC 0.0    Comprehensive metabolic panel   Result Value Ref Range    Sodium 138 133 - 144 mmol/L    Potassium 3.7 3.4 - 5.3 mmol/L    Chloride 107 94 - 109 mmol/L    Carbon Dioxide 28 20 - 32 mmol/L    Anion Gap 4 3 - 14 mmol/L    Glucose 99 70 - 99 mg/dL    Urea Nitrogen 12 7 - 30 mg/dL    Creatinine 0.77 0.52 - 1.04 mg/dL    GFR Estimate >90 >60 mL/min/[1.73_m2]    GFR Estimate If Black >90 >60 mL/min/[1.73_m2]    Calcium 9.7 8.5 - 10.1 mg/dL    Bilirubin Total 0.3 0.2 - 1.3 mg/dL    Albumin 4.1 3.4 - 5.0 g/dL    Protein Total 7.7 6.8 - 8.8 g/dL    Alkaline Phosphatase 120 40 - 150 U/L    ALT 56 (H) 0 - 50 U/L    AST 30 0 - 45 U/L   CRP inflammation   Result Value Ref Range    CRP Inflammation <2.9 0.0 - 8.0 mg/L     *Note: Due to a large number of results and/or encounters for the requested time period, some results have not been displayed. A complete set of results can be found in Results Review.     Medications   0.9% sodium chloride BOLUS (0 mLs Intravenous Stopped 12/7/20 1533)   ondansetron (ZOFRAN) injection 4 mg (4 mg Intravenous Given 12/7/20 1443)   acetaminophen (TYLENOL) tablet 1,000 mg (1,000 mg Oral Given 12/7/20 1443)   lidocaine (XYLOCAINE) 2 % 15 mL, alum & mag hydroxide-simethicone (MAALOX) 15 mL GI Cocktail (30 mLs Oral Given 12/7/20 1531)   diphenhydrAMINE (BENADRYL) capsule 50 mg (50 mg Oral Given 12/7/20 1530)             Assessments & Plan (with Medical Decision Making)   30-year-old female who presents for evaluation of crampy abdominal pain and diarrhea.  Differential includes enteritis, colitis, gastritis, pancreatitis, chronic pain, drug-seeking behavior, narcotic withdrawal, medication side effect.  Exam revealed benign abdomen and initial tachycardia.  IV was begun and patient hydrated.  Laboratories including CBC, comprehensive metabolic panel, urinalysis were all unremarkable with exception of minimally elevated ALT though decreased from most recent value.  A  formed stool was sent for C. difficile.  Patient will be contacted if positive to restart vancomycin.  While in the emergency room, in addition IV fluids, she was given Tylenol, Zofran, GI cocktail and Benadryl and felt improved.  She desired to be discharged.    I have reviewed the nursing notes.    I have reviewed the findings, diagnosis, plan and need for follow up with the patient.    Discharge Medication List as of 12/7/2020  4:23 PM          Final diagnoses:   Abdominal pain, generalized   Diarrhea, unspecified type     I, Adrienne Valadez, am serving as a trained medical scribe to document services personally performed by Josue Callahan MD based on the provider's statements to me on December 7, 2020.  This document has been checked and approved by the attending provider.    I, Josue Callahan MD, was physically present and have reviewed and verified the accuracy of this note documented by Adrienne Valadez, medical scribe.     12/7/2020   [facility] EMERGENCY DEPARTMENT     Josue Callahan MD  12/07/20 1775

## 2020-12-07 NOTE — TELEPHONE ENCOUNTER
Patient was transferred to triage.     BACKGROUND:   Patient has a history of C-Diff and was treated with Vancomycin. The patient's last stool test was resulted through [facility] as negative.   Patient was seen in the ED on 12/03/20 at the [facility] for severe back pain. Notes are in chart under encounters. Patient is waiting on a call back from the [facility] GI department. She see's her back specialist tomorrow morning.     Patient calling today stating that over the weekend her stomach started bothering her again. She feels dania stomach is constantly churning and she has not been sleeping well. Her stomach pain is intermittent. Pain is rated at 6-7/10 and she describes it as period cramping. She has had 4-6 episodes of yellow diarrhea tinged with bright red blood. She denies any fevers or other symptoms. She is drinking lots of fluids.     PLAN:   Informed the patient that writer will huddle with provider. Informed her that provider is not available until mid-morning. Patient was fine with waiting and if her symptoms worsen she will call writer back.     Jose Finney RN  December 7, 2020      Additional Information    Negative: Passed out (i.e., fainted, collapsed and was not responding)    Negative: Shock suspected (e.g., cold/pale/clammy skin, too weak to stand, low BP, rapid pulse)    Negative: Vomiting red blood or black (coffee ground) material    Negative: Sounds like a life-threatening emergency to the triager    Negative: Diarrhea is the main symptom    Negative: Rectal symptoms    Negative: SEVERE rectal bleeding (large blood clots; on and off, or constant bleeding)    Negative: SEVERE dizziness (e.g., unable to stand, requires support to walk, feels like passing out now)    Negative: MODERATE rectal bleeding (small blood clots, passing blood without stool, or toilet water turns red) more than once a day    Negative: Bloody, black, or tarry bowel movements    Negative: High-risk adult  (e.g., prior surgery on aorta, abdominal aortic aneurysm)    Negative: Rectal foreign body (inserted or swallowed)    Negative: SEVERE abdominal pain (e.g., excruciating)    Negative: Constant abdominal pain lasting > 2 hours    Negative: Pale skin (pallor) of new onset or worsening    Negative: Patient sounds very sick or weak to the triager    Negative: MODERATE rectal bleeding (small blood clots, passing blood without stool, or toilet water turns red)    Negative: Taking Coumadin (warfarin) or other strong blood thinner, or known bleeding disorder (e.g., thrombocytopenia)    Negative: Colonoscopy in past 72 hours    Negative: Known cirrhosis of the liver (or history of liver failure or ascites)    Negative: Patient wants to be seen    MILD rectal bleeding (more than just a few drops or streaks)    Protocols used: RECTAL BLEEDING-A-OH

## 2020-12-07 NOTE — TELEPHONE ENCOUNTER
Patient should use her medications - Zofran, Bentyl, & Carafate and monitor     Zach Tomlinson-LOVE Moss   Clinics - [place]

## 2020-12-07 NOTE — TELEPHONE ENCOUNTER
Patient also sent the following messages and other encounter were open.       Lucreciat- Hey. Pls call me. I ve been on hold on your phone for 30 mins at least. My stool is more loose and starting to become like jelly. Can I come in for lab today?  To test my liver function and test for c diff again. Please let me know. I feel lousy and feel throw up. It hurts!    Mychart-     Blood pressure is up and I think I m dehydrated even I drink almost half Gallon of propel daily. The stool is getting runny. I do not feel good at all.  BP- 148/101 P 107    Phone- Patient called today.     Patient would like an order for c diff stool and Hepatic Function Panel blood.     Patient states symptoms back.     Has been transferred to FNA triage.     Please contact patient.     Thank you.    Central Scheduling  Abimbola SABILLON

## 2020-12-07 NOTE — ED TRIAGE NOTES
Pt arrives ambulatory to triage w/ c/o of worsening diarrhea as well as nausea. States she has had 2 bowel movements today- reports blood in stool. States on average she has loose stools [id number]. States recently diagnosed with C. Diff- comes into today to be seen for exacerbation of aforementioned symptoms. States she took vancomycin today but does not know dose

## 2020-12-07 NOTE — TELEPHONE ENCOUNTER
Patient advised of below message and agreeable to plan. She had reached out to the GI office who informed her to do the same. She will be going in now. Advised patient to have someone drive her.     Michelle Avila RN

## 2020-12-07 NOTE — TELEPHONE ENCOUNTER
Patient called today.    Patient would like an order for c diff stool and Hepatic Function Panel blood.    Patient states symptoms back.    Has been transferred to FNA triage.    Please contact patient.    Thank you.    Central Scheduling  Abimbola SABILLON

## 2020-12-07 NOTE — TELEPHONE ENCOUNTER
Spoke to patient this morning. New encounter started. Will close encounter.   Jose Finney RN  December 7, 2020

## 2020-12-07 NOTE — DISCHARGE INSTRUCTIONS
Continue your Protonix, Carafate, antiacids  Avoid nonsteroidal anti-inflammatories such as ibuprofen, Aleve  You may try Pepto-Bismol  Follow-up with your primary physician

## 2020-12-07 NOTE — ED AVS SNAPSHOT
[facility] Emergency Department  [address] [address]  Phone: [phone]                                    Katy Islas   MRN: [id number]    Department: [facility] Emergency Department   Date of Visit: 12/7/2020           After Visit Summary Signature Page    I have received my discharge instructions, and my questions have been answered. I have discussed any challenges I see with this plan with the nurse or doctor.    ..........................................................................................................................................  Patient/Patient Representative Signature      ..........................................................................................................................................  Patient Representative Print Name and Relationship to Patient    ..................................................               ................................................  Date                                   Time    ..........................................................................................................................................  Reviewed by Signature/Title    ...................................................              ..............................................  Date                                               Time          22EPIC Rev 08/18

## 2020-12-07 NOTE — ED NOTES
Pt states through  that symptoms are relieved. Abdominal pain and nausea are resolved and pt would like to discharge. MD updated.

## 2020-12-08 ENCOUNTER — TRANSFERRED RECORDS (OUTPATIENT)
Dept: HEALTH INFORMATION MANAGEMENT | Facility: CLINIC | Age: 30
End: 2020-12-08

## 2020-12-08 LAB — INTERPRETATION ECG - MUSE: NORMAL

## 2020-12-10 ENCOUNTER — TELEPHONE (OUTPATIENT)
Dept: FAMILY MEDICINE | Facility: OTHER | Age: 30
End: 2020-12-10

## 2020-12-10 DIAGNOSIS — G89.4 CHRONIC PAIN SYNDROME: Primary | ICD-10-CM

## 2020-12-10 DIAGNOSIS — K21.9 GASTROESOPHAGEAL REFLUX DISEASE WITHOUT ESOPHAGITIS: ICD-10-CM

## 2020-12-10 RX ORDER — LIDOCAINE 50 MG/G
1 PATCH TOPICAL EVERY 24 HOURS
Qty: 30 PATCH | Refills: 1 | Status: SHIPPED | OUTPATIENT
Start: 2020-12-10 | End: 2021-02-03

## 2020-12-10 RX ORDER — LIDOCAINE HYDROCHLORIDE 20 MG/ML
15 SOLUTION OROPHARYNGEAL EVERY 4 HOURS PRN
Qty: 100 ML | Refills: 3 | Status: SHIPPED | OUTPATIENT
Start: 2020-12-10 | End: 2021-06-14

## 2020-12-10 NOTE — TELEPHONE ENCOUNTER
Pt informed that she can use the 2 medications together per Zach's note. And order for lidocaine sent to the pharmacy.

## 2020-12-10 NOTE — TELEPHONE ENCOUNTER
Patient was informed.   However, she was asking for the lidocaine that can be mixed with Miralax to help with her stomach pain.   Informed patient that writer will check with Zach.     Jose Finney RN  December 10, 2020

## 2020-12-10 NOTE — TELEPHONE ENCOUNTER
Reason for Call:  Medication or medication refill:    Do you use a [facility]?  Name of the pharmacy and phone number for the current request:  [id number] [place] [facility], [place] - [address]    Name of the medication requested:   lidocaine patch  Other request: Patient called and is wondering if she can get a prescription for this for her stomach pains. Please advise thank you    Can we leave a detailed message on this number? YES    Phone number patient can be reached at: Home number on file [phone] (home)    Best Time: anytime    Call taken on 12/10/2020 at 11:07 AM by Yahaira Watson

## 2020-12-10 NOTE — TELEPHONE ENCOUNTER
Central Prior Authorization Team   Phone: [phone]      PRIOR AUTHORIZATION DENIED    Medication: lidocaine (LIDODERM) 5 % patch - DENIED    Denial Date: 12/10/2020    Denial Rational:       Appeal Information:

## 2020-12-10 NOTE — PROGRESS NOTES
HEARING AID RECHECK    Patient Name:  Katy Islas    Patient Age:   28 year old    :  1990    Background:   Patient is here today to order a new earmold, as her current one is not fitting well and is old.  She is wearing a Phonak BTE on her right ear only, which she believes is about 2 years old.Patient is accompanied by an .     Procedures:   I changed the tube on her current earmold and took an impression of her right ear without incident.  Otoscopy indicated ear canals are clear of cerumen.    Plan:   I ordered a new silicone shell mold from [employer].  I also gave patient 24 cells 675 batteries, which her insurance will pay for.  When earmold comes in, I will call and send [employer] message so patient can schedule a fitting.        Delfino Tenorio MA, CCC-A  [place] Licensed Audiologist #[id number]  2020         Patient has an ID doctor in [place] she wants to see instead of going to [facility]. He will make her appt before starting the medication.      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  Please get to [facility] to evaluate management.  I placed id consult for main.  Pt needs to see eye doctor for ethambutol.

## 2020-12-11 ENCOUNTER — MYC MEDICAL ADVICE (OUTPATIENT)
Dept: FAMILY MEDICINE | Facility: OTHER | Age: 30
End: 2020-12-11

## 2020-12-11 ENCOUNTER — TELEPHONE (OUTPATIENT)
Dept: FAMILY MEDICINE | Facility: OTHER | Age: 30
End: 2020-12-11

## 2020-12-11 NOTE — TELEPHONE ENCOUNTER
Reason for call:  Patient reporting a symptom    Symptom or request: patients hands are swollen. She has an appt Tuesday but is wondering if there is anything she can do until then?     Duration (how long have symptoms been present):     Have you been treated for this before? No    Additional comments: please call to advise.    Phone Number patient can be reached at:  Home number on file [phone] (home)    Best Time:  any    Can we leave a detailed message on this number:  YES    Call taken on 12/11/2020 at 2:22 PM by Kenzie Waters

## 2020-12-11 NOTE — TELEPHONE ENCOUNTER
Called patient to see if she was available. Lm for patient to return call. Also informed her in my message would send a [employer] message with home care.       If patient calls back please transfer to writer. If writer is unavailable please transfer to any triage nurse.     Jose Finney RN  December 11, 2020

## 2020-12-14 ENCOUNTER — MYC REFILL (OUTPATIENT)
Dept: FAMILY MEDICINE | Facility: OTHER | Age: 30
End: 2020-12-14

## 2020-12-14 DIAGNOSIS — F32.0 MILD MAJOR DEPRESSION (H): ICD-10-CM

## 2020-12-14 DIAGNOSIS — F41.9 ANXIETY: ICD-10-CM

## 2020-12-14 NOTE — TELEPHONE ENCOUNTER
Patient is calling in today checking on her medication refill that she sent over. Explained this has not been seen yet. Thank you

## 2020-12-15 RX ORDER — CLONAZEPAM 0.5 MG/1
0.5 TABLET ORAL 2 TIMES DAILY PRN
Qty: 28 TABLET | Refills: 0 | Status: SHIPPED | OUTPATIENT
Start: 2020-12-15 | End: 2020-12-28

## 2020-12-15 NOTE — TELEPHONE ENCOUNTER
Pending Prescriptions:                       Disp   Refills    clonazePAM (KLONOPIN) 0.5 MG tablet        28 tab*0        Sig: Take 1 tablet (0.5 mg) by mouth 2 times daily as           needed for anxiety        Routing refill request to provider for review/approval because:  Drug not on the [facility] refill protocol   Last Seen: 12/2/20  Last Refilled: 12/1/20 28Tablets/Capsules  Refills: 0  Next Visit: JESÚS Finney RN  December 15, 2020

## 2020-12-21 ENCOUNTER — TRANSFERRED RECORDS (OUTPATIENT)
Dept: HEALTH INFORMATION MANAGEMENT | Facility: CLINIC | Age: 30
End: 2020-12-21

## 2020-12-21 LAB
CREAT SERPL-MCNC: 0.94 MG/DL (ref 0.57–1.11)
GFR SERPL CREATININE-BSD FRML MDRD: >60 ML/MIN/1.73M2
GLUCOSE SERPL-MCNC: 90 MG/DL (ref 70–105)
POTASSIUM SERPL-SCNC: 3.6 MMOL/L (ref 3.5–5.1)

## 2020-12-22 DIAGNOSIS — I10 HYPERTENSION GOAL BP (BLOOD PRESSURE) < 130/80: ICD-10-CM

## 2020-12-23 ENCOUNTER — PATIENT OUTREACH (OUTPATIENT)
Dept: CARE COORDINATION | Facility: CLINIC | Age: 30
End: 2020-12-23

## 2020-12-23 DIAGNOSIS — G24.3 SPASMODIC TORTICOLLIS: Primary | ICD-10-CM

## 2020-12-23 NOTE — LETTER
[facility] CARE COORDINATION  [address]  [place] [address]    December 24, 2020    Katy Islas  [address]  [place] [address]      Dear Katy,    I am a clinic community health worker who works with Aura Rosario PA-C at [facility]. I have been trying to reach you recently to introduce Clinic Care Coordination and to see if there was anything I could assist you with.  Below is a description of clinic care coordination and how I can further assist you.      The clinic care coordination team is made up of a registered nurse,  and community health worker who understand the health care system. The goal of clinic care coordination is to help you manage your health and improve access to the health care system in the most efficient manner. The team can assist you in meeting your health care goals by providing education, coordinating services, strengthening the communication among your providers and supporting you with any resource needs.    Please feel free to contact me at [phone] with any questions or concerns. We are focused on providing you with the highest-quality healthcare experience possible and that all starts with you.     Sincerely,       LYLE Boston  Clinic Care Coordination  [facility]

## 2020-12-23 NOTE — PROGRESS NOTES
Clinic Care Coordination Contact  [facility]/Voicemail       Clinical Data: Care Coordinator Outreach  Outreach attempted x 1.  Left message on patient's voicemail with call back information and requested return call.  Plan: Care Coordinator will try to reach patient again in 1-2 business days.

## 2020-12-24 RX ORDER — HYDROCHLOROTHIAZIDE 12.5 MG/1
12.5 TABLET ORAL DAILY
Qty: 30 TABLET | Refills: 1 | Status: SHIPPED | OUTPATIENT
Start: 2020-12-24 | End: 2021-02-26

## 2020-12-24 NOTE — PROGRESS NOTES
Clinic Care Coordination Contact  [facility]/Voicemail       Clinical Data: Care Coordinator Outreach  Outreach attempted x 2.  Left message on patient's voicemail with call back information and requested return call.  Plan: Care Coordinator will send unable to contact letter with care coordinator contact information via [employer]. Care Coordinator will do no further outreaches at this time.

## 2020-12-24 NOTE — TELEPHONE ENCOUNTER
Pending Prescriptions:                       Disp   Refills    hydrochlorothiazide (HYDRODIURIL) 12.5 MG *30 tab*1        Sig: Take 1 tablet (12.5 mg) by mouth daily    Last Written Prescription Date:  7/23/20  Last Fill Quantity: 30,  # refills: 1   Last office visit: 12/2/20   Future Office Visit:   Next 5 appointments (look out 90 days)    Jan 05, 2021 11:00 AM  Office Visit with Aura Rosario PA-C  [facility] ([facility]) [address] [place] [id number]  [phone]   Feb 24, 2021 10:30 AM  Return Visit with Sandra Hou MD  [facility] ([facility]) [address] [place] [address]  [phone]             Routing refill request to provider for review/approval because:  Labs out of range:  bp    Marie Nichole RN on 12/24/2020 at 9:09 AM     Pt. with hyperphosphatemia in setting of advanced CKD. Pt. on sevelamer 800 mg TID. Serum phosphorus above target range at 6.3 yesterday. Increased sevelamer to 1600 mg TID yesterday. Low phosphorus diet advised. Monitor serum phosphorus    If any questions, please feel free to contact me  Benigno Romano   Nephrology Fellow  [phone]  (After 5 pm or on weekends please page the on-call fellow) Pt. with hyperphosphatemia in setting of advanced CKD. Pt. on sevelamer 800 mg TID. Serum phosphorus above target range at 6.3 today. Increase sevelamer to 1600 mg TID Low phosphorus diet advised. Monitor serum phosphorus    If any questions, please feel free to contact me  Benigno Romano   Nephrology Fellow  [phone]  (After 5 pm or on weekends please page the on-call fellow) H/H stable in 9s  anemia likely multifactorial: Anemia of chronic disease from CKD however previously reported black stools and FOBT + now resolved  iron panel reviewed, transfuse for Hgb<7, keep T/S active  GI PCR negative, C diff negative  change protonix to po, and should follow up with GI as outpatient. No plan for inpt endoscopy.  Appreciate GI recs -ABRIL on CKD in setting of COVID infection. Last Cr on 11/24/20: 3.83  -Has had normal PO intake, was previously taking lasix-> now on hold  c/w LR IVF, Cr rising: likely in setting of fluid losses and possible GIB, avoid hypotension: may need to hold antihypertensives  - Cont. sodium bicarb tabs  - Strict I/Os, avoid nephrotoxic medications  12/7 US kidneys unremarkable  -Renal input appreciated -ABRIL on CKD in setting, cr worsening, urinating without difficulty  - previously taking lasix-> now on hold, may need to start diuresis, awaiting CXR  Renal to followup  - Cont. sodium bicarb tabs  - Strict I/Os, avoid nephrotoxic medications  12/7 US kidneys unremarkable  -Renal input appreciated -S/p kidney transplant in 1973 c/b CKD IV working on getting on transplant list at [facility].   - Cont. Prednisone 5 every other day  - DCed azathioprine given active infection with fevers Continue Azathioprine 150 mg PO daily and prednisone 5 mg PO every other day. If patient's O2 requirement worsen, then suggest to hold azathioprine. H/H slowly downtrending  anemia likely multifactorial: Anemia of chronic disease from CKD however previously reported black stools and FOBT + now resolved  Check iron panel, ferritin, transfuse for Hgb<7, keep T/S active  GI PCR negative, Cdiff negative  continue protonix 40 IVP BID. When stable for discharge can switch to PO BID x 4 weeks, and should follow up with GI as outpatient   Appreciate GI recs H/H stable in 8s  anemia likely multifactorial: Anemia of chronic disease from CKD however previously reported black stools and FOBT + now resolved  iron panel reviewed, transfuse for Hgb<7, keep T/S active  GI PCR negative, C diff negative  change protonix to po, and should follow up with GI as outpatient. No plan for inpt endoscopy.  Appreciate GI recs H/H stable in 8s  anemia likely multifactorial: Anemia of chronic disease from CKD however previously reported black stools and FOBT + now resolved  iron panel reviewed, transfuse for Hgb<7, keep T/S active  GI PCR negative, Cdiff negative  continue protonix 40 IVP BID. When stable for discharge can switch to PO BID x 4 weeks, and should follow up with GI as outpatient   Appreciate GI recs H/H stable in 8s  anemia likely multifactorial: Anemia of chronic disease from CKD however previously reported black stools and FOBT + now resolved  iron panel reviewed, transfuse for Hgb<7, keep T/S active  GI PCR negative, Cdiff negative  continue protonix 40 IVP BID. When stable for discharge can switch to PO BID x 4 weeks, and should follow up with GI as outpatient   Appreciate GI recs H/H stable in 8s  anemia likely multifactorial: Anemia of chronic disease from CKD however previously reported black stools and FOBT + now resolved  iron panel reviewed, transfuse for Hgb<7, keep T/S active  GI PCR negative, Cdiff negative  continue protonix 40 IVP BID. When stable for discharge can switch to PO BID x 4 weeks, and should follow up with GI as outpatient. No plan for inpt endoscopy.  Appreciate GI recs H/H stable in 8s  anemia likely multifactorial: Anemia of chronic disease from CKD however previously reported black stools and FOBT + now resolved  iron panel reviewed, transfuse for Hgb<7, keep T/S active  GI PCR negative, Cdiff negative  continue protonix 40 IVP BID. When stable for discharge can switch to PO BID x 4 weeks, and should follow up with GI as outpatient. No plan for inpt endoscopy.  Appreciate GI recs Pt. with hyperphosphatemia in setting of advanced CKD. Pt. on sevelamer 800 mg TID. Serum phosphorus slightly above target range at 5.9 today. Low phosphorus diet advised. Monitor serum phosphorus    If any questions, please feel free to contact me  Benigno Romano   Nephrology Fellow  [phone]  (After 5 pm or on weekends please page the on-call fellow) pt reports black watery stools for past 2 days, suspect diarrhea in setting of COVID  not on stool softeners  CBC stable, if further black stools, recheck CBC in afternoon, keep T/S active  Currently hemodynamically stable -S/p kidney transplant in 1973 c/b CKD IV working on getting on transplant list at [facility].   - Cont. Prednisone 5 every other day  - DC azathioprine given active infection with fevers H/H stable in 9s  anemia likely multifactorial: Anemia of chronic disease from CKD however previously reported black stools and FOBT + now resolved  iron panel reviewed, transfuse for Hgb<7, keep T/S active  GI PCR negative, C diff negative  po protonix, outpt GI F/U as outpatient. No plan for inpt endoscopy.

## 2020-12-28 ENCOUNTER — MYC MEDICAL ADVICE (OUTPATIENT)
Dept: FAMILY MEDICINE | Facility: OTHER | Age: 30
End: 2020-12-28

## 2020-12-28 DIAGNOSIS — F41.9 ANXIETY: ICD-10-CM

## 2020-12-28 DIAGNOSIS — R21 RASH: Primary | ICD-10-CM

## 2020-12-28 DIAGNOSIS — F32.0 MILD MAJOR DEPRESSION (H): ICD-10-CM

## 2020-12-28 RX ORDER — CLONAZEPAM 0.5 MG/1
0.5 TABLET ORAL 2 TIMES DAILY PRN
Qty: 28 TABLET | Refills: 0 | Status: SHIPPED | OUTPATIENT
Start: 2020-12-28 | End: 2021-01-05

## 2020-12-28 RX ORDER — TRIAMCINOLONE ACETONIDE 1 MG/G
OINTMENT TOPICAL 2 TIMES DAILY
Qty: 15 G | Refills: 0 | Status: SHIPPED | OUTPATIENT
Start: 2020-12-28 | End: 2021-01-19

## 2020-12-28 RX ORDER — CLONAZEPAM 0.5 MG/1
0.5 TABLET ORAL 2 TIMES DAILY PRN
Qty: 28 TABLET | Refills: 0 | Status: CANCELLED | OUTPATIENT
Start: 2020-12-28

## 2020-12-28 NOTE — TELEPHONE ENCOUNTER
Will route to out of provider pool to review.  Oneyda Tapia CMA ([facility])

## 2020-12-28 NOTE — TELEPHONE ENCOUNTER
Clonazepam was sent to pharmacy.     As far as the psoriasis I will send Triamcinolone, if this is causing joint pain she needs to follow up with [facility].     Wilson Ibarra PA-C

## 2020-12-29 ENCOUNTER — TRANSFERRED RECORDS (OUTPATIENT)
Dept: HEALTH INFORMATION MANAGEMENT | Facility: CLINIC | Age: 30
End: 2020-12-29

## 2020-12-30 NOTE — PROGRESS NOTES
Subjective     Katy Islas is a 30 year old female who presents to clinic today for the following health issues:    HPI      Patient is here today for a follow up appointment on medication. She is needing a refill on her medication for her abdominal pain. She also has had a lot of cramping but no bleeding and she will have an upcoming appointment with her OBGYN. Also would like to discuss ADD that her therapist had mentioned to her. Also, she is overdue for depo    - GI recheck today   - Has apt with GYN provider as well     - Mental health   - Psychiatrist in Feb       Review of Systems   Constitutional, HEENT, cardiovascular, pulmonary, GI, , musculoskeletal, neuro, skin, endocrine and psych systems are negative, except as otherwise noted.      Objective    /72   Pulse 80   Temp 98.3  F (36.8  C) (Temporal)   Wt 90.5 kg (199 lb 9.6 oz)   SpO2 95%   BMI 37.71 kg/m    Body mass index is 37.71 kg/m .  Physical Exam   GENERAL APPEARANCE: healthy, alert and no distress  EYES: Eyes grossly normal to inspection, PERRLA, conjunctivae and sclerae without injection or discharge, EOM intact   RESP: Lungs clear to auscultation - no rales, rhonchi or wheezes    CV: Regular rates and rhythm, normal S1 S2, no S3 or S4, no murmur, click or rub, no peripheral edema and peripheral pulses strong and symmetric bilaterally   MS: No musculoskeletal defects are noted and gait is age appropriate without ataxia   SKIN: No suspicious lesions or rashes, hydration status appears adeuqate with normal skin turgor   PSYCH: Alert and oriented x3; speech- coherent , normal rate and volume; able to articulate logical thoughts, able to abstract reason, no tangential thoughts, no hallucinations or delusions, mentation appears normal, Mood is euthymic. Affect is appropriate for this mood state and bright. Thought content is free of suicidal ideation, hallucinations, and delusions. Dress is adequate and upkept. Eye contact is good  during conversation.       Diagnostics:   Reviewed in Epic         Assessment & Plan     ICD-10-CM    1. Crohn's disease of colon with fistula (H)  K50.113 dicyclomine (BENTYL) 20 MG tablet   2. Nausea and vomiting, intractability of vomiting not specified, unspecified vomiting type  R11.2 ondansetron (ZOFRAN-ODT) 8 MG ODT tab   3. Abnormal uterine bleeding  N93.9    4. Encounter for surveillance of injectable contraceptive  Z30.42 HCG Qual, Urine ([id number])     medroxyPROGESTERone (DEPO-PROVERA) syringe 150 mg   5. Mild major depression (H)  F32.0 clonazePAM (KLONOPIN) 1 MG tablet   6. Anxiety  F41.9 clonazePAM (KLONOPIN) 1 MG tablet     - Patient here for routine check in due to high utilization of UC/ED care     - Reviewed medication use and side effects at length, refilled as needed     - Mental health      Patient does counseling once a week, still recommend psychiatry      Discussed I do not recommend continuing with Klonopin long term without psychiatrist recommendation      Referral previously given back to [facility] in [place]      Patient reports not getting relief from Klonopin 0.5 mg, still having panic attacks      Will increase to 1 mg, but with strict recommendations to patient that this will not be long term     - Patient continues to deal with abdominal pain and GI issues     She is not sure if these are coming from her Crohn's or from her uterine issues     Has upcoming visits with GYN provider and GI provider     Discussed needs to discuss going back on medication for Crohn's (was on immunotherapy in the past)    Discussed this is her #1 priority     She is in agreement     - Patient wishes to go back on her Depo shots, thinks it did help in the past and is overdue     Will get pregnancy test today, await results and then team will give Depo      Reviewed use and side effects, refilled for 1 year     Review of the result(s) of each unique test - Beta HCG done today was negative   Discussion of  management or test interpretation with external physician/other qualified healthcare professional/appropriate source - ED visits in Care Everywhere   Diagnosis or treatment significantly limited by social determinants of health - Deaf, Mental Health, Smoker     40 min spent on the date of the encounter in chart review, patient visit, review of tests, documentation and/or discussion with other providers about the issues documented above.     Due to language barrier, an  was present during the history-taking and subsequent discussion (and for part of the physical exam) with this patient.    The patient indicates understanding of these issues and agrees with the plan.    Return in about 1 month (around 2/5/2021).    Aura Rosario PA-C  [facility]

## 2020-12-31 ENCOUNTER — TELEPHONE (OUTPATIENT)
Dept: GASTROENTEROLOGY | Facility: CLINIC | Age: 30
End: 2020-12-31

## 2020-12-31 NOTE — TELEPHONE ENCOUNTER
2NGageU message sent to patient with response from Junaid Pritchett PA-C in previous note.     Cathy Benitez LPN

## 2020-12-31 NOTE — TELEPHONE ENCOUNTER
"LPN returned call to patient. Patient stated \"I have been having a lot of bath and forth to the bathroom, stomach pain, and today is stomach cramping, rectal bleeding and rectal pain and burning. Once in a while I have some nausea and vomiting.\" Patient reports she had C. Diff a few months ago and was seen in the ER a couple weeks ago for the rectal pain. Patient stated \"the ER doctor told me I needed to see GI and have a colonoscopy; the rectal exam was painful.\" Patient reports that she has had rectal bleeding off and on, but it started back yesterday. Patient reports the blood is bright red and is also in with the stool. Patient stated at worst, her abdominal pain is an 8/10, but right now while talking with her it was a 5/10. Patient stated \"It hurts to sit.\" Writer asked patient if she has tried anything to help with the pain and patient stated \"a warm bath helped a little with the rectal pain, but nothing else. How soon can I be seen?\" LPN reviewed Junaid Pritchett PA-C schedule and was able to schedule patient for a telephone visit on 1/5/21.     Cathy Benitez LPN    "

## 2020-12-31 NOTE — TELEPHONE ENCOUNTER
[facility]    Phone Message    May a detailed message be left on voicemail: yes     Reason for Call: Symptoms or Concerns     If patient has red-flag symptoms, warm transfer to triage line    Current symptom or concern: Stomach cramping, pain, and bleeding.    Symptoms have been present for:  1 week(s)    Has patient previously been seen for this? Yes    By : Junaid Pritchett     Date: 10/26/20    Are there any new or worsening symptoms? Yes: Cramping, pain, and bleeding      Action Taken: Message routed to:  [facility]  GI    Travel Screening: Not Applicable

## 2020-12-31 NOTE — TELEPHONE ENCOUNTER
She did have a colonoscopy last year which showed internal hemorrhoids. This could explain the rectal bleeding. She can use preparation H suppositories and perform sitz baths.     Will discuss further during upcoming virtual visit.     Junaid Pritchett PA-C  Gastroenterology  [facility]

## 2020-12-31 NOTE — TELEPHONE ENCOUNTER
LPN left message for patient using  ID #[id number] requesting a return call to discuss symptoms further.     Cathy Benitez LPN

## 2021-01-05 ENCOUNTER — OFFICE VISIT (OUTPATIENT)
Dept: FAMILY MEDICINE | Facility: OTHER | Age: 31
End: 2021-01-05
Payer: MEDICARE

## 2021-01-05 ENCOUNTER — VIRTUAL VISIT (OUTPATIENT)
Dept: GASTROENTEROLOGY | Facility: CLINIC | Age: 31
End: 2021-01-05
Payer: MEDICARE

## 2021-01-05 VITALS
WEIGHT: 199.6 LBS | SYSTOLIC BLOOD PRESSURE: 108 MMHG | HEART RATE: 80 BPM | DIASTOLIC BLOOD PRESSURE: 72 MMHG | TEMPERATURE: 98.3 F | OXYGEN SATURATION: 95 % | BODY MASS INDEX: 37.71 KG/M2

## 2021-01-05 DIAGNOSIS — F32.0 MILD MAJOR DEPRESSION (H): ICD-10-CM

## 2021-01-05 DIAGNOSIS — R10.84 ABDOMINAL PAIN, GENERALIZED: ICD-10-CM

## 2021-01-05 DIAGNOSIS — K62.5 BRBPR (BRIGHT RED BLOOD PER RECTUM): ICD-10-CM

## 2021-01-05 DIAGNOSIS — K50.113 CROHN'S DISEASE OF COLON WITH FISTULA (H): Primary | ICD-10-CM

## 2021-01-05 DIAGNOSIS — F41.9 ANXIETY: ICD-10-CM

## 2021-01-05 DIAGNOSIS — Z30.42 ENCOUNTER FOR SURVEILLANCE OF INJECTABLE CONTRACEPTIVE: ICD-10-CM

## 2021-01-05 DIAGNOSIS — N93.9 ABNORMAL UTERINE BLEEDING: ICD-10-CM

## 2021-01-05 DIAGNOSIS — K62.89 RECTAL PAIN: Primary | ICD-10-CM

## 2021-01-05 DIAGNOSIS — R11.2 NAUSEA AND VOMITING, INTRACTABILITY OF VOMITING NOT SPECIFIED, UNSPECIFIED VOMITING TYPE: ICD-10-CM

## 2021-01-05 LAB — HCG UR QL: NEGATIVE

## 2021-01-05 PROCEDURE — 96372 THER/PROPH/DIAG INJ SC/IM: CPT | Performed by: PHYSICIAN ASSISTANT

## 2021-01-05 PROCEDURE — 99215 OFFICE O/P EST HI 40 MIN: CPT | Mod: 25 | Performed by: PHYSICIAN ASSISTANT

## 2021-01-05 PROCEDURE — 81025 URINE PREGNANCY TEST: CPT | Performed by: PHYSICIAN ASSISTANT

## 2021-01-05 PROCEDURE — 99442 PR PHYSICIAN TELEPHONE EVALUATION 11-20 MIN: CPT | Mod: 95 | Performed by: PHYSICIAN ASSISTANT

## 2021-01-05 RX ORDER — DICYCLOMINE HCL 20 MG
20 TABLET ORAL EVERY 6 HOURS
Qty: 90 TABLET | Refills: 3 | Status: SHIPPED | OUTPATIENT
Start: 2021-01-05 | End: 2021-10-05

## 2021-01-05 RX ORDER — CLONAZEPAM 1 MG/1
1 TABLET ORAL 2 TIMES DAILY PRN
Qty: 60 TABLET | Refills: 0 | Status: SHIPPED | OUTPATIENT
Start: 2021-01-05 | End: 2021-01-30

## 2021-01-05 RX ORDER — BUPRENORPHINE 2 MG/1
2 TABLET SUBLINGUAL 3 TIMES DAILY
COMMUNITY
Start: 2020-10-30 | End: 2021-07-21

## 2021-01-05 RX ORDER — ONDANSETRON 8 MG/1
8 TABLET, ORALLY DISINTEGRATING ORAL EVERY 8 HOURS PRN
Qty: 30 TABLET | Refills: 4 | Status: SHIPPED | OUTPATIENT
Start: 2021-01-05 | End: 2021-05-05

## 2021-01-05 RX ORDER — MEDROXYPROGESTERONE ACETATE 150 MG/ML
150 INJECTION, SUSPENSION INTRAMUSCULAR
Status: DISCONTINUED | OUTPATIENT
Start: 2021-01-05 | End: 2021-08-29

## 2021-01-05 RX ADMIN — MEDROXYPROGESTERONE ACETATE 150 MG: 150 INJECTION, SUSPENSION INTRAMUSCULAR at 12:25

## 2021-01-05 ASSESSMENT — PATIENT HEALTH QUESTIONNAIRE - PHQ9
5. POOR APPETITE OR OVEREATING: NEARLY EVERY DAY
SUM OF ALL RESPONSES TO PHQ QUESTIONS 1-9: 2

## 2021-01-05 ASSESSMENT — ANXIETY QUESTIONNAIRES
2. NOT BEING ABLE TO STOP OR CONTROL WORRYING: NOT AT ALL
7. FEELING AFRAID AS IF SOMETHING AWFUL MIGHT HAPPEN: NOT AT ALL
6. BECOMING EASILY ANNOYED OR IRRITABLE: SEVERAL DAYS
1. FEELING NERVOUS, ANXIOUS, OR ON EDGE: NEARLY EVERY DAY
GAD7 TOTAL SCORE: 10
3. WORRYING TOO MUCH ABOUT DIFFERENT THINGS: NOT AT ALL
IF YOU CHECKED OFF ANY PROBLEMS ON THIS QUESTIONNAIRE, HOW DIFFICULT HAVE THESE PROBLEMS MADE IT FOR YOU TO DO YOUR WORK, TAKE CARE OF THINGS AT HOME, OR GET ALONG WITH OTHER PEOPLE: SOMEWHAT DIFFICULT
5. BEING SO RESTLESS THAT IT IS HARD TO SIT STILL: NEARLY EVERY DAY

## 2021-01-05 NOTE — NURSING NOTE
Clinic Administered Medication Documentation      Depo Provera Documentation    URINE HCG: negative    Depo-Provera Standing Order inclusion/exclusion criteria reviewed.   Patient meets: inclusion criteria     BP: 108/72  LAST PAP/EXAM:   Lab Results   Component Value Date    PAP NIL 01/16/2020       Prior to injection, verified patient identity using patient's name and date of birth. Medication was administered. Please see MAR and medication order for additional information.     Was entire vial of medication used? Yes  Vial/Syringe: Single dose vial, RIGHT GLUT  Expiration Date:  06/30/2022    Patient instructed to remain in clinic for 15 minutes.  NEXT INJECTION DUE: 3/23/21 - 4/6/21

## 2021-01-05 NOTE — PROGRESS NOTES
Katy Islas is a 30 year old female who is being evaluated via a billable telephone visit.      What phone number would you like to be contacted at? [phone]  How would you like to obtain your AVS? Billie      Subjective     Katy Islas is a 30 year old who presents via telephone visit with an  services for concerns with rectal bleeding and lower abdominal cramping.     Bleeding is occurring on and off for some time now. She has tried taking miralax and although this does soften the stool it doesn't improve the bleeding. She has had this issue off and on for over a year now. Previous work up includes a colonoscopy in 2019 which noted internal hemorrhoids. Previous intermittent rectal bleeding was thought to be related to the hemorrhoids.  She has tried prep H and cream recently without any improvement in symptoms.     She reports history of crohn's colitis however has not had any signs of IBD on previous colonoscopy in 2019 while not on any IBD medication.  She did however have a perianal abscess and anal fistula in March and April of 2020, treated by colorectal specialists. Recent CT abd pelvis during an ED visit in December 2020 alsodid not show any signs of inflammation in the colon, IBD, perirectal or perianal abscess.       Objective         Colonoscopy 07/2019  Impression:      - The examined portion of the ileum was normal.                             - No signs of IBD. Area of abnormality on the CT scan is normal endoscopically.                             - The transverse colon, ascending colon and cecum are normal.                             - One 4 mm polyp in the descending colon, removed with a cold snare. Resected and retrieved.                             - The rectum and sigmoid colon are normal.                             - Non-bleeding internal hemorrhoids. This may be the source of her rectal bleeding.                             - Anal papilla(e) were  hypertrophied.     Patient Name: SHARATH ISLAS   MR#: [id number]   Specimen #: [id number]   Collected: 7/17/2019   Received: 7/18/2019   Reported: 7/19/2019 14:00   Ordering Phy(s): DARIEN TURPIN     For improved result formatting, select 'View Enhanced Report Format' under    Linked Documents section.     SPECIMEN(S):   A: Random colon biopsy   B: Descending colon biopsy     FINAL DIAGNOSIS:   A. RANDOM COLON BIOPSY:   - Colonic mucosa with no significant histologic abnormality   - Negative for active inflammation and lymphocytic colitis     B. DESCENDING COLON POLYP, POLYPECTOMY:   - Hyperplastic polyp, negative for dysplasia     I have personally reviewed all specimens and/or slides, including the   listed special stains, and used them   with my medical judgement to determine or confirm the final diagnosis.     Electronically signed out by:     Victorino Bajwa M.D., [facility]     Vitals:  No vitals were obtained today due to virtual visit.    Physical Exam   exam unable to be completed due to telephone visits      Assessment & Plan     Rectal pain  BRBPR (bright red blood per rectum)       Sharath Islas is a 30 year old female who presents today for concerns with rectal pain and brbpr. She has noted symptoms off and on for over a year. She does have hx of perirectal abscess and anal fistula over the last year which were treated by colorectal specialists. She has not followed up with colorectal since mid last year. She does have internal hemorrhoids which were noted on previous colonoscopy dated July 2019 and this was thought to be cause of her intermittent rectal bleeding at that time as well. Recommended follow up with colorectal specialists in this regard.     She has a questionable history of crohn's disease.  She previously reports being diagnosed with crohn's colitis in adolescence and was on remicade infusions, but has not been on any medications for years. Review of records however  do not show evidence of IBD. Her last colonoscopy and upper endoscopy in July 2019 were unrevealing. A recent CT abd/pelvis from recent ED visit in December 2020 also did not show any signs or evidence of inflammation in the colon, rectal or perianal disease. I therefore do not think her symptoms are related to crohn's disease. As mentioned above her brbpr and rectal pain seem likely to be related to internal hemorrhoids. We can however consider reassessing for IBD with an MR enterography and MR Pelvis.       40 minutes spent on the date of the encounter doing chart review, history and exam, documentation and further activities as noted above      Junaid Pritchett PA-C  [facility]      Phone call duration: 15 minutes

## 2021-01-06 ENCOUNTER — OFFICE VISIT (OUTPATIENT)
Dept: AUDIOLOGY | Facility: OTHER | Age: 31
End: 2021-01-06
Payer: MEDICARE

## 2021-01-06 DIAGNOSIS — H90.3 SENSORINEURAL HEARING LOSS, BILATERAL: Primary | ICD-10-CM

## 2021-01-06 PROCEDURE — V5299 HEARING SERVICE: HCPCS | Performed by: AUDIOLOGIST

## 2021-01-06 PROCEDURE — 99207 PR NO CHARGE LOS: CPT | Performed by: AUDIOLOGIST

## 2021-01-06 ASSESSMENT — ANXIETY QUESTIONNAIRES: GAD7 TOTAL SCORE: 10

## 2021-01-06 NOTE — PROGRESS NOTES
HEARING AID DROP-OFF    Patient Name:  Katy Islas    Patient Age:   30 year old    :  1990    Background:   The patient dropped off her right hearing aid because it needed a new tube and was cracked.     SIDE: Right   MAKE: Phonak   MODEL: Deena V50-UP   S/N: [id number]   WARRANTY: 2020 ()    Procedures:   A biological listening check revealed good sound. A visual inspection revealed a small crack where the tubing enters the earmold. The ear hook was also noted to be somewhat saturated with moisture. The hearing aid was cleaned and suctioned, the earhook was changed, and the earmold was re-tubed and reattached.     Plan:   The patient will be called and informed that the hearing aid is ready to . The crack in the earmold appears to be minor at this point and the earmold does not need to be replaced until the crack affects the function or comfort of it. The patient will return as needed for hearing aid services.    CHARGES  [id number] Hearing services, [facility]. ($30)    Michelle Harris, CCC-A  Licensed Audiologist  2021

## 2021-01-07 ENCOUNTER — TRANSFERRED RECORDS (OUTPATIENT)
Dept: HEALTH INFORMATION MANAGEMENT | Facility: CLINIC | Age: 31
End: 2021-01-07

## 2021-01-07 LAB
CREAT SERPL-MCNC: 0.88 MG/DL (ref 0.57–1.11)
GFR SERPL CREATININE-BSD FRML MDRD: >60 ML/MIN/1.73M2
GLUCOSE SERPL-MCNC: 64 MG/DL (ref 70–105)
POTASSIUM SERPL-SCNC: 3.9 MMOL/L (ref 3.5–5.1)

## 2021-01-08 ENCOUNTER — NURSE TRIAGE (OUTPATIENT)
Dept: NURSING | Facility: CLINIC | Age: 31
End: 2021-01-08

## 2021-01-09 ENCOUNTER — TRANSFERRED RECORDS (OUTPATIENT)
Dept: HEALTH INFORMATION MANAGEMENT | Facility: CLINIC | Age: 31
End: 2021-01-09

## 2021-01-09 ENCOUNTER — NURSE TRIAGE (OUTPATIENT)
Dept: NURSING | Facility: CLINIC | Age: 31
End: 2021-01-09

## 2021-01-09 LAB
ALT SERPL-CCNC: 117 U/L (ref 8–45)
AST SERPL-CCNC: 288 U/L (ref 5–41)
CREAT SERPL-MCNC: 0.78 MG/DL (ref 0.57–1.11)
GFR SERPL CREATININE-BSD FRML MDRD: >60 ML/MIN/1.73M(2)
GLUCOSE SERPL-MCNC: 82 MG/DL (ref 70–105)
INR PPP: 1.1 (ref 0.9–1.1)
POTASSIUM SERPL-SCNC: 4.1 MMOL/L (ref 3.5–5.1)

## 2021-01-09 NOTE — TELEPHONE ENCOUNTER
Pt called in states she had swelling eye today in the morning.  Pt had fall 3 times this morning.  It was 2 am today.  Pt had fall 2 times this afternoon.  They did xray at the ED [facility].  The Pt states she can not open her left eye and still swelling.  The Pt states at the ED they told her she had concussion.  Pt states she has chest pain.  The pain is constat.  The chest pain is 7/10 on the scale.  Has pain on her left head.  No difficulty breathing.  Pt has history of BP.  No dizziness.  No nausea or vomit.  Pt is sleeping at this time.  Inform the caller to call 911.  The Pt agree with the plan.  No other concern at this time.  Sign language interpretor was used.      Domenic Ruiz Nurse Advisor 1/8/2021 10:34 PM           Reason for Disposition    Passed out (i.e., lost consciousness, collapsed and was not responding)    Additional Information    Negative: [1] Chest pain lasts > 5 minutes AND [2] not relieved with nitroglycerin    Negative: [1] Chest pain lasts > 5 minutes AND [2] age > 30 AND [3] at least one cardiac risk factor (i.e., hypertension, diabetes, obesity, smoker or strong family history of heart disease)    Negative: [1] Chest pain lasts > 5 minutes AND [2] age > 50    Negative: [1] Chest pain lasts > 5 minutes AND [2] described as crushing, pressure-like, or heavy    Negative: [1] Chest pain lasts > 5 minutes AND [2] history of heart disease  (i.e., heart attack, bypass surgery, angina, angioplasty, CHF; not just a heart murmur)    Negative: Shock suspected (e.g., cold/pale/clammy skin, too weak to stand, low BP, rapid pulse)    Negative: Difficult to awaken or acting confused (e.g., disoriented, slurred speech)    Negative: Severe difficulty breathing (e.g., struggling for each breath, speaks in single words)    Protocols used: CHEST PAIN-A-AH

## 2021-01-10 ENCOUNTER — TRANSFERRED RECORDS (OUTPATIENT)
Dept: HEALTH INFORMATION MANAGEMENT | Facility: CLINIC | Age: 31
End: 2021-01-10

## 2021-01-10 LAB
ALT SERPL-CCNC: 113 U/L (ref 8–45)
AST SERPL-CCNC: 224 U/L (ref 5–41)
CREAT SERPL-MCNC: 0.66 MG/DL (ref 0.57–1.11)
GFR SERPL CREATININE-BSD FRML MDRD: >60 ML/MIN/1.73M(2)
GLUCOSE SERPL-MCNC: 105 MG/DL (ref 70–100)
INR PPP: 1 (ref 0.9–1.1)
POTASSIUM SERPL-SCNC: 4.5 MMOL/L (ref 3.5–5.1)

## 2021-01-10 NOTE — TELEPHONE ENCOUNTER
Patient calling with  on the line.  Says she was diagnosed with pneumonia at ED yesterday.   Says she still has a lot of chest pain and does not feel good.  Says she is worse than when at the ED.      states patient is fading in and out of being awake.  Advised patient to call 911.  She says she has already called 911 four times this week.  She states they do not give her a taxi ride home.  She disconnects call.    Caitlyn Cabral RN  Triage Nurse Advisor    COVID 19 Nurse Triage Plan/Patient Instructions    Please be aware that novel coronavirus (COVID-19) may be circulating in the community. If you develop symptoms such as fever, cough, or SOB or if you have concerns about the presence of another infection including coronavirus (COVID-19), please contact your health care provider or visit [email or web address].     Disposition/Instructions    Call to EMS/911 recommended. Follow protocol based instructions.     Bring Your Own Device:  Please also bring your smart device(s) (smart phones, tablets, laptops) and their charging cables for your personal use and to communicate with your care team during your visit.    Thank you for taking steps to prevent the spread of this virus.  o Limit your contact with others.  o Wear a simple mask to cover your cough.  o Wash your hands well and often.    Resources    [facility]: About COVID-19: [email or web address]    CDC: What to Do If You're Sick: www.cdc.gov/coronavirus/2019-ncov/about/steps-when-sick.html    CDC: Ending Home Isolation: www.cdc.gov/coronavirus/2019-ncov/hcp/disposition-in-home-patients.html     CDC: Caring for Someone: www.cdc.gov/coronavirus/2019-ncov/if-you-are-sick/care-for-someone.html     [facility]: Interim Guidance for Hospital Discharge to Home: www.health.[facility].[place]/diseases/coronavirus/hcp/hospdischarge.pdf    [facility] clinical trials (COVID-19 research studies): clinicalaffairs.[facility]/umn-clinical-trials      Below are the COVID-19 hotlines at the [place] Department of Health ([facility]). Interpreters are available.   o For health questions: Call 422-008-0636 or 1-895.790.8532 (7 a.m. to 7 p.m.)  o For questions about schools and childcare: Call 707-885-2618 or 1-395.629.2184 (7 a.m. to 7 p.m.)       Reason for Disposition    Difficult to awaken or acting confused (e.g., disoriented, slurred speech)    Protocols used: CHEST PAIN-A-AH

## 2021-01-11 ENCOUNTER — PATIENT OUTREACH (OUTPATIENT)
Dept: CARE COORDINATION | Facility: CLINIC | Age: 31
End: 2021-01-11

## 2021-01-11 ENCOUNTER — TRANSFERRED RECORDS (OUTPATIENT)
Dept: HEALTH INFORMATION MANAGEMENT | Facility: CLINIC | Age: 31
End: 2021-01-11

## 2021-01-11 DIAGNOSIS — Z71.89 OTHER SPECIFIED COUNSELING: Primary | Chronic | ICD-10-CM

## 2021-01-11 NOTE — PROGRESS NOTES
Clinic Care Coordination Contact  [facility]/Voicemail       Clinical Data: Care Coordinator Outreach  Outreach attempted x 1.  Left message on Aysha's voicemail with call back information and requested return call.  Plan:  Care Coordinator will try to reach patient again in 1-2 business days.

## 2021-01-11 NOTE — LETTER
[facility]  [address]  [place] [address]    January 13, 2021    Katy Islas  [address]  [place] [address]      Dear Katy,    I am a clinic community health worker who works with Aura Rosario PA-C at [facility]. I have been trying to reach you recently to introduce Clinic Care Coordination and to see if there was anything I could assist you with.  Below is a description of clinic care coordination and how I can further assist you.      The clinic care coordination team is made up of a registered nurse,  and community health worker who understand the health care system. The goal of clinic care coordination is to help you manage your health and improve access to the health care system in the most efficient manner. The team can assist you in meeting your health care goals by providing education, coordinating services, strengthening the communication among your providers and supporting you with any resource needs.    Please feel free to contact me at [phone] with any questions or concerns. We are focused on providing you with the highest-quality healthcare experience possible and that all starts with you.     Sincerely,     Nora

## 2021-01-12 ENCOUNTER — TRANSFERRED RECORDS (OUTPATIENT)
Dept: HEALTH INFORMATION MANAGEMENT | Facility: CLINIC | Age: 31
End: 2021-01-12

## 2021-01-12 LAB
CREAT SERPL-MCNC: 0.73 MG/DL (ref 0.57–1.11)
GFR SERPL CREATININE-BSD FRML MDRD: >60 ML/MIN/1.73M2
GLUCOSE SERPL-MCNC: 92 MG/DL (ref 70–100)
POTASSIUM SERPL-SCNC: 3.2 MMOL/L (ref 3.5–5.1)

## 2021-01-12 NOTE — PROGRESS NOTES
Clinic Care Coordination Contact  [facility]/Voicemail       Clinical Data: Care Coordinator Outreach  Outreach attempted x 2.  Left message on Mom voicemail with call back information and requested return call.  Plan:  Care Coordinator will try to reach patient again in 1-2 business days.    Spoke with Aysha, mom. She said, that Katy is currently sleeping as they didn't get back from the ER until 3am. Aysha mentioned to try calling her either today or tomorrow around 3pm.

## 2021-01-13 NOTE — PROGRESS NOTES
Clinic Care Coordination Contact  [facility]/Voicemail       Clinical Data: Care Coordinator Outreach  Outreach attempted x 3.  Left message on patient's voicemail with call back information and requested return call.  Plan: Care Coordinator will send care coordination introduction letter with care coordinator contact information and explanation of care coordination services via [employer]. Care Coordinator will do no further outreaches at this time.

## 2021-01-14 ENCOUNTER — MYC MEDICAL ADVICE (OUTPATIENT)
Dept: FAMILY MEDICINE | Facility: OTHER | Age: 31
End: 2021-01-14

## 2021-01-14 ENCOUNTER — TELEPHONE (OUTPATIENT)
Dept: FAMILY MEDICINE | Facility: OTHER | Age: 31
End: 2021-01-14

## 2021-01-14 ENCOUNTER — HOSPITAL ENCOUNTER (EMERGENCY)
Facility: CLINIC | Age: 31
Discharge: HOME | End: 2021-01-14
Attending: NURSE PRACTITIONER | Admitting: NURSE PRACTITIONER
Payer: MEDICARE

## 2021-01-14 ENCOUNTER — APPOINTMENT (OUTPATIENT)
Dept: GENERAL RADIOLOGY | Facility: CLINIC | Age: 31
End: 2021-01-14
Attending: NURSE PRACTITIONER
Payer: MEDICARE

## 2021-01-14 VITALS
OXYGEN SATURATION: 92 % | RESPIRATION RATE: 16 BRPM | DIASTOLIC BLOOD PRESSURE: 67 MMHG | HEART RATE: 95 BPM | HEIGHT: 62 IN | BODY MASS INDEX: 36.62 KG/M2 | SYSTOLIC BLOOD PRESSURE: 111 MMHG | TEMPERATURE: 99 F | WEIGHT: 199 LBS

## 2021-01-14 DIAGNOSIS — J06.9 VIRAL URI: ICD-10-CM

## 2021-01-14 DIAGNOSIS — R21 RASH: ICD-10-CM

## 2021-01-14 DIAGNOSIS — E87.6 HYPOKALEMIA: ICD-10-CM

## 2021-01-14 DIAGNOSIS — L08.9 SKIN PUSTULE: ICD-10-CM

## 2021-01-14 LAB
ANION GAP SERPL CALCULATED.3IONS-SCNC: 4 MMOL/L (ref 3–14)
BASOPHILS # BLD AUTO: 0 10E9/L (ref 0–0.2)
BASOPHILS NFR BLD AUTO: 0.2 %
BUN SERPL-MCNC: 9 MG/DL (ref 7–30)
CALCIUM SERPL-MCNC: 9 MG/DL (ref 8.5–10.1)
CHLORIDE SERPL-SCNC: 104 MMOL/L (ref 94–109)
CO2 SERPL-SCNC: 34 MMOL/L (ref 20–32)
CREAT SERPL-MCNC: 0.75 MG/DL (ref 0.52–1.04)
DIFFERENTIAL METHOD BLD: ABNORMAL
EOSINOPHIL # BLD AUTO: 0.1 10E9/L (ref 0–0.7)
EOSINOPHIL NFR BLD AUTO: 1.4 %
ERYTHROCYTE [DISTWIDTH] IN BLOOD BY AUTOMATED COUNT: 12.2 % (ref 10–15)
FLUAV RNA RESP QL NAA+PROBE: NEGATIVE
FLUBV RNA RESP QL NAA+PROBE: NEGATIVE
GFR SERPL CREATININE-BSD FRML MDRD: >90 ML/MIN/{1.73_M2}
GLUCOSE SERPL-MCNC: 101 MG/DL (ref 70–99)
HCT VFR BLD AUTO: 35.2 % (ref 35–47)
HGB BLD-MCNC: 12.1 G/DL (ref 11.7–15.7)
LABORATORY COMMENT REPORT: NORMAL
LACTATE BLD-SCNC: 1.3 MMOL/L (ref 0.7–2)
LYMPHOCYTES # BLD AUTO: 1.7 10E9/L (ref 0.8–5.3)
LYMPHOCYTES NFR BLD AUTO: 25.9 %
MCH RBC QN AUTO: 33.1 PG (ref 26.5–33)
MCHC RBC AUTO-ENTMCNC: 34.4 G/DL (ref 31.5–36.5)
MCV RBC AUTO: 96 FL (ref 78–100)
MONOCYTES # BLD AUTO: 0.4 10E9/L (ref 0–1.3)
MONOCYTES NFR BLD AUTO: 6.3 %
NEUTROPHILS # BLD AUTO: 4.2 10E9/L (ref 1.6–8.3)
NEUTROPHILS NFR BLD AUTO: 66.2 %
PLATELET # BLD AUTO: 237 10E9/L (ref 150–450)
POTASSIUM SERPL-SCNC: 3 MMOL/L (ref 3.4–5.3)
RBC # BLD AUTO: 3.66 10E12/L (ref 3.8–5.2)
RSV RNA SPEC QL NAA+PROBE: NORMAL
SARS-COV-2 RNA RESP QL NAA+PROBE: NEGATIVE
SODIUM SERPL-SCNC: 142 MMOL/L (ref 133–144)
SPECIMEN SOURCE: NORMAL
WBC # BLD AUTO: 6.4 10E9/L (ref 4–11)

## 2021-01-14 PROCEDURE — 96361 HYDRATE IV INFUSION ADD-ON: CPT | Performed by: NURSE PRACTITIONER

## 2021-01-14 PROCEDURE — 96374 THER/PROPH/DIAG INJ IV PUSH: CPT | Mod: 59 | Performed by: NURSE PRACTITIONER

## 2021-01-14 PROCEDURE — 80048 BASIC METABOLIC PNL TOTAL CA: CPT | Performed by: NURSE PRACTITIONER

## 2021-01-14 PROCEDURE — 87636 SARSCOV2 & INF A&B AMP PRB: CPT | Performed by: NURSE PRACTITIONER

## 2021-01-14 PROCEDURE — 99284 EMERGENCY DEPT VISIT MOD MDM: CPT | Mod: 25 | Performed by: NURSE PRACTITIONER

## 2021-01-14 PROCEDURE — 10060 I&D ABSCESS SIMPLE/SINGLE: CPT | Performed by: NURSE PRACTITIONER

## 2021-01-14 PROCEDURE — 87070 CULTURE OTHR SPECIMN AEROBIC: CPT | Performed by: NURSE PRACTITIONER

## 2021-01-14 PROCEDURE — C9803 HOPD COVID-19 SPEC COLLECT: HCPCS | Performed by: NURSE PRACTITIONER

## 2021-01-14 PROCEDURE — 85025 COMPLETE CBC W/AUTO DIFF WBC: CPT | Performed by: NURSE PRACTITIONER

## 2021-01-14 PROCEDURE — 258N000003 HC RX IP 258 OP 636: Performed by: NURSE PRACTITIONER

## 2021-01-14 PROCEDURE — 250N000013 HC RX MED GY IP 250 OP 250 PS 637: Mod: GY | Performed by: NURSE PRACTITIONER

## 2021-01-14 PROCEDURE — 87040 BLOOD CULTURE FOR BACTERIA: CPT | Performed by: NURSE PRACTITIONER

## 2021-01-14 PROCEDURE — 36415 COLL VENOUS BLD VENIPUNCTURE: CPT | Performed by: NURSE PRACTITIONER

## 2021-01-14 PROCEDURE — 250N000011 HC RX IP 250 OP 636: Performed by: NURSE PRACTITIONER

## 2021-01-14 PROCEDURE — 96375 TX/PRO/DX INJ NEW DRUG ADDON: CPT | Performed by: NURSE PRACTITIONER

## 2021-01-14 PROCEDURE — 83605 ASSAY OF LACTIC ACID: CPT | Performed by: NURSE PRACTITIONER

## 2021-01-14 PROCEDURE — 71045 X-RAY EXAM CHEST 1 VIEW: CPT

## 2021-01-14 RX ORDER — POTASSIUM CHLORIDE 1500 MG/1
20 TABLET, EXTENDED RELEASE ORAL 2 TIMES DAILY
Qty: 4 TABLET | Refills: 0 | Status: SHIPPED | OUTPATIENT
Start: 2021-01-14 | End: 2021-01-16

## 2021-01-14 RX ORDER — DIPHENHYDRAMINE HYDROCHLORIDE 50 MG/ML
25 INJECTION INTRAMUSCULAR; INTRAVENOUS ONCE
Status: COMPLETED | OUTPATIENT
Start: 2021-01-14 | End: 2021-01-14

## 2021-01-14 RX ORDER — SODIUM CHLORIDE 9 MG/ML
INJECTION, SOLUTION INTRAVENOUS CONTINUOUS
Status: DISCONTINUED | OUTPATIENT
Start: 2021-01-14 | End: 2021-01-14 | Stop reason: HOSPADM

## 2021-01-14 RX ORDER — POTASSIUM CHLORIDE 1500 MG/1
20 TABLET, EXTENDED RELEASE ORAL ONCE
Status: COMPLETED | OUTPATIENT
Start: 2021-01-14 | End: 2021-01-14

## 2021-01-14 RX ORDER — KETOROLAC TROMETHAMINE 15 MG/ML
15 INJECTION, SOLUTION INTRAMUSCULAR; INTRAVENOUS ONCE
Status: COMPLETED | OUTPATIENT
Start: 2021-01-14 | End: 2021-01-14

## 2021-01-14 RX ORDER — CEPHALEXIN 500 MG/1
500 CAPSULE ORAL 4 TIMES DAILY
Qty: 28 CAPSULE | Refills: 0 | Status: SHIPPED | OUTPATIENT
Start: 2021-01-14 | End: 2021-01-24

## 2021-01-14 RX ADMIN — KETOROLAC TROMETHAMINE 15 MG: 15 INJECTION, SOLUTION INTRAMUSCULAR; INTRAVENOUS at 20:12

## 2021-01-14 RX ADMIN — POTASSIUM CHLORIDE 20 MEQ: 1500 TABLET, EXTENDED RELEASE ORAL at 20:54

## 2021-01-14 RX ADMIN — CEPHALEXIN 500 MG: 250 CAPSULE ORAL at 20:53

## 2021-01-14 RX ADMIN — DIPHENHYDRAMINE HYDROCHLORIDE 25 MG: 50 INJECTION, SOLUTION INTRAMUSCULAR; INTRAVENOUS at 19:58

## 2021-01-14 RX ADMIN — SODIUM CHLORIDE 1000 ML: 9 INJECTION, SOLUTION INTRAVENOUS at 19:57

## 2021-01-14 ASSESSMENT — ENCOUNTER SYMPTOMS
WEAKNESS: 0
DIZZINESS: 0
DIARRHEA: 0
SHORTNESS OF BREATH: 0
VOMITING: 0
CHILLS: 1
COUGH: 1
HEADACHES: 0
NAUSEA: 1
FATIGUE: 1
APPETITE CHANGE: 0
ABDOMINAL PAIN: 0
FEVER: 1

## 2021-01-14 ASSESSMENT — MIFFLIN-ST. JEOR: SCORE: 1570.91

## 2021-01-14 NOTE — TELEPHONE ENCOUNTER
Zach please review the picture attached to this encounter.   Writer will call patient and document in third message to team, then route to Zach.     Jose Finney RN, BSN  [place] [facility]  January 14, 2021

## 2021-01-14 NOTE — ED AVS SNAPSHOT
[facility] Emergency Dept  [address] [place] [address]  Phone: [phone]  Fax: [phone]                                    Katy Islas   MRN: [id number]    Department: [facility] Emergency Dept   Date of Visit: 1/14/2021           After Visit Summary Signature Page    I have received my discharge instructions, and my questions have been answered. I have discussed any challenges I see with this plan with the nurse or doctor.    ..........................................................................................................................................  Patient/Patient Representative Signature      ..........................................................................................................................................  Patient Representative Print Name and Relationship to Patient    ..................................................               ................................................  Date                                   Time    ..........................................................................................................................................  Reviewed by Signature/Title    ...................................................              ..............................................  Date                                               Time          22EPIC Rev 08/18

## 2021-01-14 NOTE — ED TRIAGE NOTES
Had an episode where she passed out in the kitchen 1 week ago and does not remember it.  States she was brought to the ED but does not remember that, and states some numbness to left side of face and nose since falling.  Feeling fatigued, short of breath, productive cough.  Noticed a rash to trunk today.  States she was recently at [facility] and they wanted to admit her for pneumonia but she chanegd her mind and left.  Also states her right elbow is swollen and causing a lot of pain.    Mentions that her feet and toes seem swollen.

## 2021-01-14 NOTE — TELEPHONE ENCOUNTER
Yes needs apt for medical advise. OK to see another provider tomorrow as long as it is just about her elbow.    Zach Rosario PA-C  [facility]

## 2021-01-14 NOTE — TELEPHONE ENCOUNTER
Zach please review and advise patient responses.   Responded to mychart.   Jose Finney RN  January 14, 2021

## 2021-01-14 NOTE — TELEPHONE ENCOUNTER
Message copied and pasted in other encounter on 01/14/21.   Jose Finney RN, BSN  [facility] [place]  January 14, 2021

## 2021-01-14 NOTE — TELEPHONE ENCOUNTER
Patient has been diagnosed negative for Covid, and told she has fluid around her heart and lungs. They want to admit her to the hospital, but she does not want to go. It could have been a false negative. She had a fever yesterday, but it is gone today. Today her eye is swollen and painful and it has a gooey discharge. Can she get something for possible pinkeye? Patient needs an . It will automatically go to an  when she is called. She also has a sore elbow with a lump. It feels warm to the touch. Please advise.

## 2021-01-14 NOTE — TELEPHONE ENCOUNTER
Message below was also sent from patient in a additional message.     Patient has been diagnosed negative for Covid, and told she has fluid around her heart and lungs. They want to admit her to the hospital, but she does not want to go. It could have been a false negative. She had a fever yesterday, but it is gone today. Today her eye is swollen and painful and it has a gooey discharge. Can she get something for possible pinkeye? Patient needs an . It will automatically go to an  when she is called. She also has a sore elbow with a lump. It feels warm to the touch. Please advise.     Jose Fineny RN, BSN  [place]  January 14, 2021

## 2021-01-14 NOTE — TELEPHONE ENCOUNTER
Writer called to discuss further with the patient.     Patient states that she woke up this AM in unbearable pain. She rated her pain a 10/10 this AM. She took some Buprenorphine, which didn't help the pain subside. Her pain is located on her elbow and eye. ([facility] are [facility])    Last week patient was having passing out episodes. She passed out and hit her eye and elbow. She has a bump on her elbow and her eye is swollen shut. Currently she rates her pain at a 6/10. Her friend drove her to the hospital and in the car the patient passed out. She was dx with sepsis and pneumonia and was advised she should be admitted, but refused. She was given IV antibiotics in the ED. She has a pulse ox at home and her 02 level was 82 and during conversation her 02 was 89. Her pulse was between 120-124.   During conversation the patient was having short (1-2) episodes of passing out. Patient states her left eye is difficult to see out of. She has numbness on the left side of her face into her neck and down her left arm. She describes having mild chest pain from coughing up green phlmg. She feels fatigue and that her heart is racing. She has not been able to drink or eat much.     PLAN:   Informed PCP of plan.   Called 911 to come transport the patient to the hospital for further evaluation.     Jose Finney RN, BSN  [place]  January 14, 2021

## 2021-01-15 ENCOUNTER — PATIENT OUTREACH (OUTPATIENT)
Dept: CARE COORDINATION | Facility: CLINIC | Age: 31
End: 2021-01-15

## 2021-01-15 ENCOUNTER — TELEPHONE (OUTPATIENT)
Dept: FAMILY MEDICINE | Facility: OTHER | Age: 31
End: 2021-01-15

## 2021-01-15 ENCOUNTER — OFFICE VISIT (OUTPATIENT)
Dept: FAMILY MEDICINE | Facility: CLINIC | Age: 31
End: 2021-01-15
Payer: MEDICARE

## 2021-01-15 VITALS
SYSTOLIC BLOOD PRESSURE: 140 MMHG | TEMPERATURE: 98.4 F | HEART RATE: 100 BPM | DIASTOLIC BLOOD PRESSURE: 90 MMHG | OXYGEN SATURATION: 95 % | WEIGHT: 202 LBS | RESPIRATION RATE: 18 BRPM | BODY MASS INDEX: 36.95 KG/M2

## 2021-01-15 DIAGNOSIS — Z71.89 OTHER SPECIFIED COUNSELING: ICD-10-CM

## 2021-01-15 DIAGNOSIS — E87.6 HYPOKALEMIA: ICD-10-CM

## 2021-01-15 DIAGNOSIS — B37.31 CANDIDIASIS OF VAGINA: ICD-10-CM

## 2021-01-15 DIAGNOSIS — K92.2 UPPER GI BLEED: ICD-10-CM

## 2021-01-15 DIAGNOSIS — R29.6 FALLS FREQUENTLY: ICD-10-CM

## 2021-01-15 DIAGNOSIS — I10 HYPERTENSION GOAL BP (BLOOD PRESSURE) < 130/80: ICD-10-CM

## 2021-01-15 DIAGNOSIS — R79.89 ELEVATED TROPONIN: ICD-10-CM

## 2021-01-15 DIAGNOSIS — J45.30 MILD PERSISTENT ASTHMA WITHOUT COMPLICATION: ICD-10-CM

## 2021-01-15 DIAGNOSIS — M25.521 RIGHT ELBOW PAIN: Primary | ICD-10-CM

## 2021-01-15 PROCEDURE — 99215 OFFICE O/P EST HI 40 MIN: CPT | Performed by: FAMILY MEDICINE

## 2021-01-15 RX ORDER — OXYCODONE AND ACETAMINOPHEN 7.5; 325 MG/1; MG/1
1 TABLET ORAL EVERY 8 HOURS PRN
Qty: 8 TABLET | Refills: 0 | Status: SHIPPED | OUTPATIENT
Start: 2021-01-15 | End: 2021-01-19

## 2021-01-15 RX ORDER — FLUCONAZOLE 150 MG/1
150 TABLET ORAL ONCE
Qty: 1 TABLET | Refills: 0 | Status: SHIPPED | OUTPATIENT
Start: 2021-01-15 | End: 2021-01-15

## 2021-01-15 NOTE — DISCHARGE INSTRUCTIONS
Right elbow pustule:  --possibly early abscess or cellulitis.  --incision and drainage performed  --wound culture obtained and results should be done in 24 hours  --Keflex 500 mg 4 times a day for 10 days.  First dose given in the emergency department today.  --Warm compresses 4 times a day.  --return for increased redness or swelling.    Your potassium is mildly low today.  --Potassium chloride 20 mEq twice a day for the next 2 days.  --Your first dose was given here today in the emergency department  --Need to have your potassium rechecked early next week.  --I have attached a handout regarding foods high in potassium  The remainder of your labs and chest xray are not concerning for any serious infection at this time.

## 2021-01-15 NOTE — RESULT ENCOUNTER NOTE
[place] ED discharge antibiotic (if prescribed):  Cephalexin (Keflex) 500 mg capsule, 1 capsule (500 mg) by mouth 4 times daily for 10 days.  Incision and Drainage performed in [place] ED [Yes / No] : Yes  No changes in treatment per ED lab result protocol.

## 2021-01-15 NOTE — ED NOTES
Pt reports the rash that started when she first arrived has gotten worse. Red areas are noted above both breasts and on her abd at this time.

## 2021-01-15 NOTE — PROGRESS NOTES
Clinic Care Coordination Contact    Situation: Patient chart reviewed by care coordinator.    Background: Patient with recent ED visit on 1/14 for symptom of fever.  Per ED note and chart review, patient has been to ED 9 times within past week to evaluate symptoms including chest pain and respiratory symptoms with no ACS diagnoses made. She would leave AMA when not receiving IV opioid and has hx of opioid dependence per medical history.    Assessment: Additional Care Coordination referral placed 1/15 from discharge report due to elevated RISK criteria however CCC team has made 5 outreaches within past 30 days, as well as sent two introduction letters to explain care coordination services to patient.    From chart review, patient maintains communication with her PCP via [employer] and has future appt scheduled on 2/2.    Plan/Recommendations: No additional outreach planned by care coordination at this time following 1/14 ED visit.    EDYTA ReedN, RN   [facility]  - Clinic Care Coordinator

## 2021-01-15 NOTE — PROGRESS NOTES
Assessment & Plan   1. Right elbow pain: Discussed offering Percocet for acute pain.  8 tablets will be given.   reviewed.  I did discuss in detail to call her [facility] clinic and ask about a pain contract prior to picking these up.  If she does have a contract she should call my office and we will cancel this prescription.  She will need to maximize treatment with Tylenol.  NSAIDs may not be the best option for her given history of reported GI bleed as below.  I did discuss in detail not taking this medication with other opiates or buprenorphine or Klonopin.  She should not drive on this medication.  I would not prescribe this long-term and will only use it for acute pain for concern of infection therapy which is being treated with antibiotics.  Should not need longer than 3 days.  Patient is agreeable with this plan.  She should follow-up with her primary care provider next week to discuss the elevated troponin, possible lymphadenopathy which is most likely reactive seen on CT scan, falls, recheck hypokalemia, recheck blood pressure, etc.  Staff will help schedule this as well as her other referrals today.  - oxyCODONE-acetaminophen (PERCOCET) 7.5-325 MG per tablet; Take 1 tablet by mouth every 8 hours as needed for severe pain  Dispense: 8 tablet; Refill: 0    2. Upper GI bleed - suspected: Patient reports history of GI bleed while taking NSAIDs.  Is prescribed aspirin she does take Protonix.  Avoid NSAID-based medications when able.    3. Elevated troponin: Reported elevated troponin II separate ER visits downtrending.  Patient left AMA from those visits time.  No specific EKG changes.  CTA did not reveal any PEs but did show opacities concerning for atypical pneumonia treated with azithromycin.  Covid test negative.  Given frequent falls recommending Lexiscan testing for further evaluation of this.  Control blood pressure as able.  Currently on baby aspirin.  - NM Lexiscan stress test; Future    4.  Candidiasis of vagina: No itching and discharge and starting Keflex.  Will treat empirically with Diflucan.  Follow-up if not improving.  - fluconazole (DIFLUCAN) 150 MG tablet; Take 1 tablet (150 mg) by mouth once for 1 dose  Dispense: 1 tablet; Refill: 0    5. Falls frequently: Recommend gait strengthening to help with falls.  If persistent consider further evaluation with neurology or Holter monitor etc.  Does not sound as though most of these falls are related to syncopal episodes.  - PHYSICAL THERAPY REFERRAL; Future    6. Hypokalemia: Being replaced from emergency department.  Recommend rechecking at next visit.    7. Mild persistent asthma without complication: Currently controlled per patient.  Good by allergies.  Uses Flonase for this.  Has albuterol available as needed.  Asthma action plan given today.  - Asthma Action Plan (AAP)    8. Hypertension goal BP (blood pressure) < 130/80: Elevated slightly today likely due to pain.  Repeat visit in 1 week.  If persistently elevated consider adding another agent.  Currently on HCTZ, losartan, and Lasix.    52 minutes spent on the date of the encounter doing chart review of her 9 ER visits through care everywhere since the beginning of the year,  records, history and exam, discussion of treatment options using a professional , documentation and further activities as noted above    Return in about 1 week (around 1/22/2021) for chest pain follow-up.    Bacilio Adams MD  [facility]     This chart is completed utilizing dictation software; typos and/or incorrect word substitutions may unintentionally occur.      Alex Burns is a 30 year old who presents to clinic today for the following health issues     HPI     Patient's current antibiotic is causing a yeast infection, would like antibiotics for that.     ED/UC Followup:    Facility:  [facility] [place]  Date of visit: 1/14/2021  Reason for visit: see  chart  Current Status: fall, bruising on her left arm near elbow, left eye swelling and redness, very fatigued and weak.      Patient comes in today for concern of ongoing right elbow pain.  She states she noticed a rash there and had it drained at the emergency department on 1/14/2021.  She was started on Keflex at that time.  Wound culture is pending from this visit.  She denies any fevers, but pain is currently rated as a 7 out of 10.    She has had numerous emergency department visits this year alone many times leaving AMA.  She was treated for atypical pneumonia with a Z-Nathan during 1 of these visits.  Additionally she was also noticed to have a slightly elevated troponin, negative CTA of the chest other than the atypical pneumonia noted, and at a subsequent visit a downtrending troponin.  This was thought to be due to demand or tachycardia.    A good summary of her recent hospitalization visits can be found in her most recent emergency department visit on 1/14/2021.    She also reports concern for falls most recently last week injuring her left face and arm.  She does not know why she fell.  She does not necessarily remember the incident either.  She does not think she has had other syncopal episodes.  She is concerned about her gait.    An in person professional  was present during the visit.    Past medical history was reviewed as well with some concern for prior opioid dependence is currently buprenorphine following with [facility] for low back pain.   was reviewed showing occasional Percocet prescriptions through her eye spine clinic in addition to her buprenorphine.  She was always been told not to take the Klonopin or buprenorphine with her Percocet medication when prescribed.    Additionally she was noted to have hypokalemia mildly during her ER visit yesterday.  She is also starting to have itching and discharge from her vagina since starting the Keflex is concern for  yeast infection.    She states she has had history of plan prior GI bleeds and is status post take ibuprofen.  She has been taking Tylenol which has not been helping with her current pain.    She is unsure if she has a current pain contract with [facility].    States her asthma is currently controlled and mainly triggered by allergies.    Review of Systems   Constitutional, HEENT, cardiovascular, pulmonary, gi and gu systems are negative, except as otherwise noted.      Objective    BP (!) 132/90   Pulse 100   Temp 98.4  F (36.9  C) (Temporal)   Resp 18   Wt 91.6 kg (202 lb)   SpO2 95%   BMI 36.95 kg/m    Body mass index is 36.95 kg/m .  Physical Exam   General: Obese female. Appears in no acute distress.  Cardiovascular: Regular rate and rhythm, normal S1 and S2 without murmur. No extra heartsounds or friction rub. Radial pulses present and equal bilaterally.  Respiratory: Lungs clear to auscultation bilaterally. No wheezing or crackles. No prolonged expiration. Symmetrical chest rise.  Musculoskeletal: No gross extremity deformities. No peripheral edema. Normal muscle bulk.   Derm: Approximate 50 cent sized area of erythema over right olecranon with area of drainage from prior aspiration.  Tender to touch.  Numerous other areas of ecchymosis.  exposed surfaces.    Stress test pending

## 2021-01-15 NOTE — TELEPHONE ENCOUNTER
Patient calling to give up date on ER visit from yesterday.    Right elbow continues to be painful. In ER had her right elbow I & D and prescribed antibiotics.    Report her elbow continues to be painful, but elbow does continue to hurt but feels better after I/D.    Alternating tylenol/ibuprofen. Requesting stronger medication for pain. Discussed she would need to see provider for assessment. Requested appointment to be seen.      Gurmeet Smith, RN, BSN

## 2021-01-15 NOTE — ED PROVIDER NOTES
History     Chief Complaint   Patient presents with     Fever     HPI   Using .   Patient is a difficult historian and at times she closes her eyes and not looking at the  to listen to what I am saying.     Katy Islas is a 30 year old female legally deaf, asthma, fibromyalgia, GERD, chronic pain-on medical marijauna (opioid dependent, on buprenorphine and medical marijauna), migraines, Crohn's disease, conversion disorder, and pseudoseizures who presents to the emergency department for evaluation of cough, body aches, rash, and right elbow pain. Patient has had multiple ED visits in the last week (9 times) at several different emergency departments with extensive work-up for chest pain pathway and respiratory illness. Most of these visits she has left AMA when not getting IV opioid medications. She had respiratory symptoms concerning for pneumonia vs covid infection with questionable episodes of hypoxia. She was noted to have an elevated troponin of 0.123 on 1/8/2021 at [facility] and this eventually was thought to be related to hypoxia/demand and did trend downward. She had chest CT findings concerning for Covid but has had negative covid testing. Patient tells me she continues to have a bad cough. She is nauseated. She developed a rash on her chest today and it is pruritic. She denies any new medications. She awoke today with right elbow pain and has an area of redness. Pt reports episodes of abscess in the past that needed draining. She reports having a fever of 101 yesterday.     Synopsis of her multiple ED visits at several hospitals in the last week:    Seen 1/7/2021 [facility] ED- had a fall after missing a step and hitting her head, chest and complained of back and righ thip pain. Normal EKG. She was going to be given Toradol for pain and then became angry, swearing at the staff and left AMA.    Seen 1/8/2021 and 1/9/2021 [facility] ED x 2. Chest pain,  elevated troponin 0.123.. CT scans of her head, facial bones, neck and chest x-ray were negative for acute injury from her fall. Plan for admission but patient left AMA both times when not being given opioid pain meds.    Seen 1/8/2021  [facility] ED, CT chest with ground glass opacities, bilateral. Covid test negative. Admitted, but left AMA shortly after when she didn't get IV Dilaudid for pain.    CT Chest Pulmonary Angio  1.  No central pulmonary embolus seen involving the pulmonary trunk, main right and left pulmonary arteries, and proximal upper and lower lobar vessels. Peripheral pulmonary arterial branches are poorly assessed for clot due to respiratory motion artifact.   2.  Patchy groundglass infiltrates are seen in both upper lobes and the superior segment of both lower lobes. This may be infectious/inflammatory but is nonspecific. Covid 19 neither established nor excluded by this exam. Recommend follow-up CT imaging, after completion of therapy, to confirm resolution of these infiltrates. It should be noted that much more conspicuous infiltrates were present on the previous CT scan of 1/29/2020.   3.  Mildly prominent subcarinal, AE recess, and right hilar lymph nodes, possibly reactive. Attention to these lymph nodes on the above recommended CT is suggested.     Seen 1/9/2021 [facility] ED- admitted for possible pneumonia.    Katy Islas is a 30 y.o. female presenting today with pneumonia and hypoxia. Patient's COVID-19 testing is negative however the imaging of her chest to rule out PE does look most consistent with possible COVID-19 pneumonia. The CT scan does rule out PE. Patient has an elevated troponin but this is downtrending from her visit to [facility] yesterday. This is most consistent with a demand ischemia. Her EKG here does not show any acute ischemia. Patient has no risk factors for ACS. I think that her elevated troponin is likely secondary to her hypoxia and tachycardia.  "Patient was admitted to the hospitalist service however that she then decided to sign out AMA. She is signed out by the hospitalist and initially left the emergency department but not the hospital grounds. Patient then changed her mind and represented to the emergency department where she was admitted to the hospitalist service. Patient was admitted in stabilized condition. Antibiotics were started to cover for atypical pneumonia. She admitted stabilized condition    Pt admitted via W/C from EC. VSS, placed on tele. BGM 67--8 oz apple juice given, which pt consumed immediately. Placed on IVF-LR @ 150/hr. Upon asking admission questions via notepad and pen, pt wrote that she wanted dilaudid for pain. Writer explained to patient that dilaudid was not ordered, and reminded her that she was informed of this in ED. Writer offered IV tordal or flexeril. Pt refused, writing that \"I do not like tordal\", and \"I need dilaudid or I will withdraw. If you do not give it to me than I am leaving.\" Writer paged Dr Contreras and explained that pt will leave AMA if she does not get dilaudid. Dr Contreras explained that pt is not to get dilaudid, and that she would come to talk to pt. Writer informed pt, and pt ripped out IV and requested writer take tele off. Writer removed tele and placed dressing over IV site. Patient told writer to leave. Writer left room and obtained leaving AMA paperwork. Writer attempted to have patient sign paperwork, but she refused to do so and again asked writer to leave, informing writer that her friend would be here \"this afternoon\" to pick her up.       Seen four times a [facility]: The following are ED notes from those visits  1/10/2021  Katy Islas is a 30 Y female with a history of asthma, anxiety, depression, personality disorder, fibromyalgia, and opioid dependence currently on buprenorphine who presents today for evaluation of chest pain and shortness of breath. A sign language interpretor " was utilized during this visit, however the patient is a poor historian and mother at bedside provides a majority of the history and helps to translate for the patient. The patient has a complicated recent history due to leaving multiple hospitals AMA. She was initially seen at [facility] on 1/7/2021 after a fall where she hit her head. She left at that time due to being offered Toradol and this had made her upset. She was then seen at [place] the next day, 1/8/2021 where she had CT's of her head, facial bones, and cervical spine which were unremarkable. Her troponin was noted to be elevated at that time so she was admitted for further workup, however due to not being given her regular medications she left AMA to get them from home. She then returned to [place] the same day, 1/8/2021, however she was upset due to not being readmitted immediately and left AMA again. She then presented at [facility] where she had noted some chest pain and shortness of breath and underwent a CT PE which was negative for PE but did show bilateral ground glass opacities concerning for pneumonia versus COVID-19. Her COVID test was negative and she had CT PE on 1/9/21 at [facility] reveals patchy groundglass infiltrates are seen in both upper lobes and the superior segment of both lower lobes.she was treated for a pneumonia. She was given 2 g Rocephin, but again left AMA before being started on Azithromycin and Cefdinir. She then returned to [facility] the next morning, 1/9/2020, where she received 2 g Rocephin and 500 mg Azithromycin, but again became upset when she was not given Dilaudid and left AMA. That night she presented at [facility] where she was given another 1 g of Rocephin and transferred as a direct admit here for further cares. The patient then became upset here this morning and her mom arrived after driving up from [place] to pick her up and take her home. She now returns here again due to persistent symptoms.  Her mother has spoken with her and she has agreed to be admitted again. Patient's mother has been very helpful here in the ED. We did use [facility]  services but patient declined using the [employer]  service services and she stated she felt comfortable with her mother interpreting. Here in the ED UA reveals negative nitrogen negative leukocyte esterase. Urine culture is pending. Blood culture x1 is pending. Patient does have an additional blood culture pending from last evening. They were unable to obtain a second blood culture here in the ED but again she does have a second blood culture pending from her visit last evening. CRP is 3.50 white count is 4.7 and hemoglobin globin is 11.7. 43.3% neutrophils and 40.2% lymphocytes. LDH is 667. BNP and lactic acid are currently pending.. Troponin is pending. O2 sats upon arrival are 84% on room air. Patient is requiring 1 L of oxygen to maintain sats at 90% or higher. [facility] and patient was being admitted to the medical floor on a monitored bed on oxygen. Patient was about to be admitted to the floor and the emergency department nurse however the given report to the floor nurse and patient ripped off her wristband and stated that she was leaving and left AGAINST MEDICAL ADVICE.. We did explain to patient that she has a multifocal pneumonia and that she does need to continue on oxygen and that and has continued need for IV antibiotic treatment and that if she leaves the hospital she may die. She was vulgar language and stated that she did not care and she left the hospital AGAINST MEDICAL ADVICE. I informed her that we are available at all times to help her and encouraged her to stay but she continued to use vulgar language and LEFT AGAINST MEDICAL ADVICE. Patient repeats understanding of rehab side. We did use [facility]  services and mother also served as a  here in the ED which patient  had accepted has excepted as a form of translation as well.      1/10/2021:  She now returns tonight with her mother stating that she is agreeable to admission. I did place an IV and she received a dose of IV ceftriaxone. The patient had received a dose of azithromycin earlier today already. Given her CT findings concerning for Covid, I was concerned for Covid pneumonia despite her negative swabs over the past couple of days. She was agreeable to obtaining another covid swab, and this was again negative. The patient was complaining of a migraine as well as her continued chronic pain. I discussed multiple non-narcotic pain options with her, and she declined all of these. Patient was requesting Dilaudid, and I discussed with her my concerns for giving this including her history of narcotic abuse as well as her intermittent hypoxia. I did offer to give her her home dose of her buprenorphine, as she states she normally takes this TID and has only had one dose thus far today. The patient was initially agreeable, though prior to being given she changed her mind and subsequently stated that she no longer wanted to be admitted. I attempted to discuss the risks of leaving with the patient, but she refused to stay in her room to listen to this and refused to sign any paperwork. I do feel the patient has decisional capacity at this time. The patient will be discharged AMA. I did give a prescription for a course of doxycycline and I encouraged the patient and mother that she should return if she will become agreeable to admission or for any worsening symptoms. Mother states that she will watch the patient at home.     1/11/2021:  Katy Islas is a 30 Y female patient who presents to the emergency department with the above symptoms. Triage vital signs show tachycardia of 130. On my examination this had resolved. For most of her stay in the emergency department she did not require supplemental oxygen except one recorded O2  sat that I did not visualize was 89%. Chest x-ray no longer shows a pathology. Labs showed mild hypokalemia with a negative troponin. EKG was unchanged when compared to priors. I reviewed her prior CT angiogram. Given the episode of hypoxia, patient should be admitted. Patient walked out of the ED. I spoke with her mother at length, who states she will not come back right now. Mother will buy a pulse oximeter for home and continue to get her daughter to take the doxycycline. She will attempt to bring her back when able.      1/12/2021:  Katy Islas is a 30 Y female who comes in with shortness of breath. She apparently has been seen for several times over the last few days for concerns for COVID-19. She was seen here twice, seen in [place] twice, seen in [place], and seen in [place] just over the last few days. She does not give me any history today all the history is obtained from her mother. Her exam seems normal. Her oxygen saturations are good when I am in the room. Occasionally the oximeter will dip down but it is when it is not reading right. Her sats are clearly 93 to 94% on room air and she is breathing very comfortably. I did order some blood work and cultures. I did do a chest x-ray. She refused the blood work and refused the blood cultures. I spoke to her mother at length and she understands and is comfortable with our plan. She was given a prescription for doxycycline from a couple days ago but did not have any pneumonia noted on her x-ray reading. She was discharged from the ER today since she was not wanting any work done. Her mother is understanding and comfortable with that plan.     Allergies:  Allergies   Allergen Reactions     Iohexol Hives     Other reaction(s): Hives  IV contrast; patient states she tolerates contrast if she gets benadryl before  IV contrast; patient states she tolerates contrast if she gets benadryl before     Baclofen      hives     Bees Hives, Swelling and  Difficulty breathing     Contrast Dye      Hives,   Updated 5/10/2016 CT Contrast.     Iodine Hives     Metoclopramide      Other reaction(s): Tremors  LW Reaction: shaking/sweating     Midazolam      Other reaction(s): Agitation     Monosodium Glutamate      Morphine Other (See Comments)     Difficulty with urination     Nsaids Other (See Comments)     GI BLEED x2     Other (Do Not Use) Other (See Comments)     Xanaflex- pt becomes disoriented and loses bladder control     Reglan [Metoclopramide Hcl] Other (See Comments)     shaking     Soma [Carisoprodol] Visual Disturbance     Sleep walking     Tizanidine      Topamax Other (See Comments)     Topamax [Topiramate] Nausea     Tingling  GI/Vomit     Tramadol      Severe Headache, Seizure Risk     Tylenol W/Codeine [Acetaminophen-Codeine] Nausea and Itching     Tylenol 3     Versed Other (See Comments)     Zolmitriptan      Makes face feel like its twitching     Droperidol Anxiety     Flu Virus Vaccine Rash      Arm swelling       Problem List:    Patient Active Problem List    Diagnosis Date Noted     Lymphedema 09/10/2020     Priority: Medium     Patellofemoral pain syndrome of both knees 06/11/2020     Priority: Medium     Primary osteoarthritis of both knees 06/11/2020     Priority: Medium     Abscess of anal and rectal regions 04/27/2020     Priority: Medium     Added automatically from request for surgery [id number]       Rectal pain 04/19/2020     Priority: Medium     Abdominal pain 04/18/2020     Priority: Medium     Morbid obesity (H) 03/10/2020     Priority: Medium     Abnormal uterine bleeding 02/12/2020     Priority: Medium     Added automatically from request for surgery [id number]       Anal fissure 02/04/2020     Priority: Medium     Low hematocrit 02/04/2020     Priority: Medium     Elevated d-dimer 02/04/2020     Priority: Medium     Hypertension goal BP (blood pressure) < 130/80 08/05/2019     Priority: Medium     Bulge of cervical disc without myelopathy  04/24/2019     Priority: Medium     Cervicalgia 04/17/2019     Priority: Medium     Class 1 obesity due to excess calories without serious comorbidity with body mass index (BMI) of 32.0 to 32.9 in adult 04/03/2019     Priority: Medium     Hypophosphatemia 11/01/2018     Priority: Medium     Patellar maltracking, right 09/05/2018     Priority: Medium     Right anterior knee pain 09/05/2018     Priority: Medium     Melanocytic nevus, unspecified location 07/23/2018     Priority: Medium     Dysplastic skin lesion 07/23/2018     Priority: Medium     Iliotibial band syndrome affecting lower leg, right 12/21/2017     Priority: Medium     Chondromalacia of right patellofemoral joint 12/21/2017     Priority: Medium     Upper GI bleed - suspected 07/01/2017     Priority: Medium     Tobacco use disorder 07/01/2017     Priority: Medium     History of pseudoseizure 07/01/2017     Priority: Medium     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) 03/10/2017     Priority: Medium     Lumbar disc disease with radiculopathy 02/23/2017     Priority: Medium     S/P lumbar fusion 02/23/2017     Priority: Medium     Dr. YOVANI Millan, 2/23/17       Vitamin D deficiency 01/06/2017     Priority: Medium     Atypical mole 01/06/2017     Priority: Medium     Mild persistent asthma without complication 12/02/2016     Priority: Medium     Chronic bilateral low back pain with left-sided sciatica 12/02/2016     Priority: Medium     Bee sting allergy 09/16/2016     Priority: Medium     C. difficile colitis 08/26/2016     Priority: Medium     Gastroesophageal reflux disease without esophagitis 08/26/2016     Priority: Medium     Herpes simplex virus infection 11/12/2015     Priority: Medium     Adjustment disorder with mixed anxiety and depressed mood 10/14/2015     Priority: Medium     Marijuana abuse 02/22/2015     Priority: Medium     Bipolar disorder (H) 01/31/2013     Priority: Medium     Problem list name updated by automated process.  Provider to review       Chronic pain 02/09/2012     Priority: Medium     Patient is followed by Aura Rosario PA-C for ongoing prescription of pain medication.  All refills should only be approved by this provider, or covering partner.    Medication(s): Norco    Maximum quantity per month: 70  Clinic visit frequency required: Q 2 months     Controlled substance agreement:   Discussed and signed 4/25/17    Encounter-Level CSA - 01/12/2015:                 Controlled Substance Agreement - Scan on 1/26/2015  9:14 AM : Controlled Medication Agreement-01/12/15 (below)            Pain Clinic evaluation in the past: Yes       Date/Location:   [facility], fall 2016    DIRE Total Score(s):    4/25/2017   Total Score 17       Last [facility] website verification:  done on 4/25/17 by LOVE Maki   [email or web address]           Anxiety 09/22/2011     Priority: Medium     Mild major depression (H) 08/29/2011     Priority: Medium     Mild intermittent asthma 10/12/2009     Priority: Medium     Overview:   Formatting of this note might be different from the original.  Action plan: See letter 4/10/2013   ATAQ:   Asthma Data 4/10/2013   Date completed 4/10/2013   Missed daily activities no = 0   Wake at night no = 0   Believe asthma in control yes = 0   ARIN use yes   Maximum ARIN use in 1 day 1-4 = 0   ATAQ score 0 = well controlled   Asthma ED visits in past 12 mos 0   Asthma hospitalizations in past 12 mos 0     Current Outpatient Prescriptions   Medication Sig     albuterol (PROVENTIL) 0.083 % neb solution Inhale 3 mL via a nebulizer every 4 hours if needed for Shortness Of Breath.        Crohn's colitis (H) 08/04/2009     Priority: Medium     Dysmenorrhea 05/11/2007     Priority: Medium     Benign neoplasm of skin of lower limb, including hip 03/16/2004     Priority: Medium     Migraine      Priority: Medium     Dr. Farrar - Neurology.  Now on Inderal.  Seems to be working.  Follow-up  9/08  Problem list name updated by automated process. Provider to review       Hearing loss      Priority: Medium     Congenital  Problem list name updated by automated process. Provider to review       Allergic rhinitis      Priority: Medium     Problem list name updated by automated process. Provider to review          Past Medical History:    Past Medical History:   Diagnosis Date     Abdominal pain 10/31- 11/4/2005     Allergic rhinitis, cause unspecified      Arthritis      C. 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, without mention of intractable epilepsy      Migraine 07/21/12     Migraine, unspecified, without mention of intractable migraine without mention of status migrainosus      Mild intermittent asthma      Mycoplasma infection in conditions classified elsewhere and of unspecified site      Other chronic pain      Renal disease      Unspecified hearing loss        Past Surgical History:    Past Surgical History:   Procedure Laterality Date     AS REMOVAL OF COCCYX  10/18/2017     C FUSION OF SACROILIAC JOINT  10/26/2018     COLONOSCOPY  7/1/2009    [facility].     COLONOSCOPY N/A 7/17/2019    Procedure: Colonoscopy, With Polypectomy And Biopsy;  Surgeon: Edwin Conde MD;  Location: [facility] OR     COLONOSCOPY WITH CO2 INSUFFLATION N/A 7/17/2019    Procedure: COLONOSCOPY, WITH CO2 INSUFFLATION;  Surgeon: Edwin Conde MD;  Location: [facility] OR     COMBINED ESOPHAGOSCOPY, GASTROSCOPY, DUODENOSCOPY (EGD) WITH CO2 INSUFFLATION N/A 4/12/2019    Procedure: COMBINED ESOPHAGOSCOPY, GASTROSCOPY, DUODENOSCOPY (EGD) WITH CO2 INSUFFLATION;  Surgeon: Amaya  Edwin German MD;  Location: [facility] OR     ESOPHAGOSCOPY, GASTROSCOPY, DUODENOSCOPY (EGD), COMBINED N/A 4/12/2019    Procedure: Combined Esophagoscopy, Gastroscopy, Duodenoscopy (Egd), Biopsy Single Or Multiple;  Surgeon: Edwin Conde MD;  Location: [facility] OR     FISTULOTOMY RECTUM N/A 4/30/2020    Procedure: EXAM UNDER ANESTHESIA, ANUS, parital fistulotomy;  Surgeon: Valentin Sanchez MD;  Location: [facility] OR     FUSION SPINE POSTERIOR MINIMALLY INVASIVE ONE LEVEL N/A 2/23/2017    Procedure: FUSION SPINE POSTERIOR MINIMALLY INVASIVE ONE LEVEL;  L4-5 Oblique Lateral Lumbar Interbody Fusion   Epidural steroid injection.   Transpedicular Bone marrow aspiration;  Surgeon: Jeniffer Eugene MD;  Location: [facility] OR     HC COLONOSCOPY THRU STOMA WITH BIOPSY  10/29/2003    Impression is that of normal appearing colonoscopy, without evidence of rectal bleeding.     HC COLONOSCOPY THRU STOMA, DIAGNOSTIC  10/00    normal     HC COLONOSCOPY THRU STOMA, DIAGNOSTIC  Oct 2009    Dr López- normal     HC EEG AWAKE AND SLEEP      abnormal     HC MRI BRAIN W/O CONTRAST  12/00    normal     HC REMOVAL GALLBLADDER  8/5/2009    [facility], [facility].     HC UGI ENDOSCOPY DIAG W BIOPSY  11/11/09    Normal esophagus     HC UGI ENDOSCOPY, SIMPLE EXAM  7/00, 10/00    mild chronic esophagitis and duodenitis, neg H pylori     HC UGI ENDOSCOPY, SIMPLE EXAM  01/20/2005    Esophagogastroduodenoscopy, colonoscopy with biopsies.  [facility] UGI ENDOSCOPY, SIMPLE EXAM  7/1/2009    [facility], [facility].      UGI ENDOSCOPY, SIMPLE EXAM  11/11/2009    attempted upper GI, pt. could not tolerate procedure:[facility]     ORTHOPEDIC SURGERY  October 19,2011    diskectomy L4-L5       Family History:    Family History   Problem Relation Age of Onset     Gastrointestinal Disease Brother         severe Crohn's     Neurologic Disorder Brother         Seizures post head injury     Depression  Brother      Substance Abuse Brother      Genitourinary Problems Father         kidney stones     Diabetes Father      Heart Disease Father         Open heart surgery     Breast Cancer Maternal Grandmother      Parkinsonism Maternal Grandmother      Cerebrovascular Disease Paternal Grandmother      Cancer Maternal Grandfather         Lung     Cardiovascular Paternal Grandfather         Heart Attack     Substance Abuse Mother         in recovery x1 year      Lung Cancer Paternal Uncle      Cancer Paternal Uncle      Lung Cancer Maternal Uncle      Colon Cancer Maternal Uncle        Social History:  Marital Status:  Single [1]  Social History     Tobacco Use     Smoking status: Current Every Day Smoker     Packs/day: 0.25     Years: 5.00     Pack years: 1.25     Types: Cigarettes     Smokeless tobacco: Never Used   Substance Use Topics     Alcohol use: Not Currently     Comment: Not since last July 2018     Drug use: Yes     Types: Marijuana     Comment: Medical Marijuana currently-- More CBD        Medications:         acetaminophen (TYLENOL) 500 MG tablet       albuterol (PROAIR HFA/PROVENTIL HFA/VENTOLIN HFA) 108 (90 Base) MCG/ACT inhaler       albuterol (PROVENTIL) (2.5 MG/3ML) 0.083% neb solution       alum & mag hydroxide-simethicone (MAALOX ADVANCED MAX ST) 400-400-40 MG/5ML SUSP suspension       ARIPiprazole (ABILIFY) 30 MG tablet       aspirin (ASA) 81 MG tablet       azelastine (OPTIVAR) 0.05 % ophthalmic solution       buprenorphine (SUBUTEX) 2 MG SUBL sublingual tablet       buPROPion (WELLBUTRIN XL) 300 MG 24 hr tablet       busPIRone (BUSPAR) 15 MG tablet       cephALEXin (KEFLEX) 500 MG capsule       clonazePAM (KLONOPIN) 1 MG tablet       cyclobenzaprine (FLEXERIL) 10 MG tablet       dicyclomine (BENTYL) 20 MG tablet       diphenhydrAMINE (BENADRYL) 50 MG capsule       DULoxetine (CYMBALTA) 60 MG EC capsule       EPINEPHrine (ANY BX GENERIC EQUIV) 0.3 MG/0.3ML injection 2-pack       furosemide (LASIX)  "20 MG tablet       gabapentin (NEURONTIN) 300 MG capsule       hydrochlorothiazide (HYDRODIURIL) 12.5 MG tablet       lidocaine (XYLOCAINE) 2 % solution       medical cannabis (Patient's own supply)       methocarbamol (ROBAXIN) 750 MG tablet       Multiple Vitamins-Minerals (EQ MULTIVITAMINS ADULT GUMMY) CHEW       ondansetron (ZOFRAN-ODT) 8 MG ODT tab       pantoprazole (PROTONIX) 40 MG EC tablet       potassium chloride ER (KLOR-CON M) 20 MEQ CR tablet       triamcinolone (KENALOG) 0.1 % external ointment       carisoprodol (SOMA) 350 MG tablet       diclofenac (VOLTAREN) 1 % topical gel       fexofenadine (ALLEGRA) 180 MG tablet       fluticasone (FLONASE) 50 MCG/ACT nasal spray       hyoscyamine (LEVSIN) 0.125 MG tablet       lamoTRIgine (LAMICTAL) 25 MG tablet       lidocaine (LIDODERM) 5 % patch       losartan (COZAAR) 100 MG tablet       medroxyPROGESTERone (DEPO-PROVERA) 150 MG/ML IM injection       NONFORMULARY       nystatin (MYCOSTATIN) 160509 UNIT/ML suspension       polyethylene glycol (MIRALAX) 17 g packet       prazosin (MINIPRESS) 1 MG capsule       prochlorperazine (COMPAZINE) 10 MG tablet       rizatriptan (MAXALT-MLT) 5 MG ODT       sucralfate (CARAFATE) 1 GM tablet       valACYclovir (VALTREX) 1000 mg tablet          Review of Systems   Constitutional: Positive for chills, fatigue and fever. Negative for appetite change.   HENT: Positive for congestion.    Respiratory: Positive for cough. Negative for shortness of breath.    Cardiovascular: Negative for chest pain.   Gastrointestinal: Positive for nausea. Negative for abdominal pain, diarrhea and vomiting.   Genitourinary: Negative.    Musculoskeletal:        Right elbow pain.   Skin: Positive for rash.   Neurological: Negative for dizziness, weakness and headaches.   All other systems reviewed and are negative.      Physical Exam   BP: 129/80  Pulse: 118  Temp: 99.2  F (37.3  C)  Resp: 19  Height: 157.5 cm (5' 2\")  Weight: 90.3 kg (199 " lb)  SpO2: 94 %      Physical Exam  Constitutional:       General: She is in acute distress.      Appearance: She is not ill-appearing, toxic-appearing or diaphoretic.   HENT:      Nose: Nose normal.      Mouth/Throat:      Mouth: Mucous membranes are moist.   Eyes:      General: No scleral icterus.     Conjunctiva/sclera: Conjunctivae normal.   Cardiovascular:      Rate and Rhythm: Regular rhythm. Tachycardia present.   Pulmonary:      Effort: Pulmonary effort is normal.      Breath sounds: Normal breath sounds.   Abdominal:      General: Bowel sounds are normal. There is no distension.      Palpations: Abdomen is soft.      Tenderness: There is no abdominal tenderness.   Musculoskeletal: Normal range of motion.      Right lower leg: No edema.      Left lower leg: No edema.      Comments: Right elbow:   --over the olecranon there is 3cm x 3cm area of erythema with a 1cm pustule at the center. Tenderness with palpation.  --able to perform FROM of the elbow   Skin:     General: Skin is warm and dry.      Comments: Excoriations on her upper chest and abdomen from scratching.    Neurological:      General: No focal deficit present.      Mental Status: She is alert and oriented to person, place, and time.         ED Course        [facility]    PROCEDURE: -Incision/Drainage    Date/Time: 1/14/2021 9:00 PM  Performed by: Akila Tamayo APRN CNP  Authorized by: Akila Tamayo APRN CNP       LOCATION:      Type:  Abscess    Size:  1cm    Location:  Upper extremity    Upper extremity location:  Elbow    Elbow location:  R elbow    PRE-PROCEDURE DETAILS:     Skin preparation:  Betadine    ANESTHESIA (see MAR for exact dosages):     Anesthesia method:  Local infiltration    Local anesthetic:  Lidocaine 1% WITH epi    PROCEDURE TYPE:     Complexity:  Simple    PROCEDURE DETAILS:     Needle aspiration: no      Incision types:  Stab incision    Incision depth:  Dermal    Scalpel  blade:  11    Wound management:  Probed and deloculated    Drainage:  Purulent and bloody    Drainage amount:  Scant    Wound treatment:  Wound left open    Packing materials:  None  Post-procedure details:     PROCEDURE   Patient Tolerance:  Patient tolerated the procedure well with no immediate complications                     Results for orders placed or performed during the hospital encounter of 01/14/21 (from the past 24 hour(s))   Symptomatic Influenza A/B & SARS-CoV2 (COVID-19) Virus PCR Multiplex    Specimen: Nasopharyngeal   Result Value Ref Range    Flu A/B & SARS-COV-2 PCR Source Nasopharyngeal     SARS-CoV-2 PCR Result NEGATIVE     Influenza A PCR Negative NEG^Negative    Influenza B PCR Negative NEG^Negative    Respiratory Syncytial Virus PCR (Note)     Flu A/B & SARS-CoV-2 PCR Comment (Note)    XR Chest Port 1 View    Narrative    XR PORTABLE CHEST ONE VIEW   1/14/2021 6:54 PM     HISTORY: Cough, short of breath, fever.    COMPARISON: 4/4/2020.      Impression    IMPRESSION: Mild patchy opacity at the left costophrenic angle may be  atelectasis or subtle developing airspace disease. Hypoinflated lungs.  Intrathecal wires identified. Normal cardiac silhouette.   Basic metabolic panel   Result Value Ref Range    Sodium 142 133 - 144 mmol/L    Potassium 3.0 (L) 3.4 - 5.3 mmol/L    Chloride 104 94 - 109 mmol/L    Carbon Dioxide 34 (H) 20 - 32 mmol/L    Anion Gap 4 3 - 14 mmol/L    Glucose 101 (H) 70 - 99 mg/dL    Urea Nitrogen 9 7 - 30 mg/dL    Creatinine 0.75 0.52 - 1.04 mg/dL    GFR Estimate >90 >60 mL/min/[1.73_m2]    GFR Estimate If Black >90 >60 mL/min/[1.73_m2]    Calcium 9.0 8.5 - 10.1 mg/dL   CBC with platelets differential   Result Value Ref Range    WBC 6.4 4.0 - 11.0 10e9/L    RBC Count 3.66 (L) 3.8 - 5.2 10e12/L    Hemoglobin 12.1 11.7 - 15.7 g/dL    Hematocrit 35.2 35.0 - 47.0 %    MCV 96 78 - 100 fl    MCH 33.1 (H) 26.5 - 33.0 pg    MCHC 34.4 31.5 - 36.5 g/dL    RDW 12.2 10.0 - 15.0 %     Platelet Count 237 150 - 450 10e9/L    Diff Method Automated Method     % Neutrophils 66.2 %    % Lymphocytes 25.9 %    % Monocytes 6.3 %    % Eosinophils 1.4 %    % Basophils 0.2 %    Absolute Neutrophil 4.2 1.6 - 8.3 10e9/L    Absolute Lymphocytes 1.7 0.8 - 5.3 10e9/L    Absolute Monocytes 0.4 0.0 - 1.3 10e9/L    Absolute Eosinophils 0.1 0.0 - 0.7 10e9/L    Absolute Basophils 0.0 0.0 - 0.2 10e9/L   Lactic acid whole blood   Result Value Ref Range    Lactic Acid 1.3 0.7 - 2.0 mmol/L     *Note: Due to a large number of results and/or encounters for the requested time period, some results have not been displayed. A complete set of results can be found in Results Review.       Medications   0.9% sodium chloride BOLUS (0 mLs Intravenous Stopped 1/14/21 2054)     Followed by   sodium chloride 0.9% infusion (has no administration in time range)   diphenhydrAMINE (BENADRYL) injection 25 mg (25 mg Intravenous Given 1/14/21 1958)   ketorolac (TORADOL) injection 15 mg (15 mg Intravenous Given 1/14/21 2012)   potassium chloride ER (KLOR-CON M) CR tablet 20 mEq (20 mEq Oral Given 1/14/21 2054)   cephALEXin (KEFLEX) capsule 500 mg (500 mg Oral Given 1/14/21 2053)       Assessments & Plan (with Medical Decision Making)   Using .   Patient is a difficult historian and at times she closes her eyes and not looking at the  to listen to what I am saying.     Katy Islas is a 30 year old female legally deaf, asthma, fibromyalgia, GERD, chronic pain-on medical marijauna (opioid dependent, on buprenorphine and medical marijauna), migraines, Crohn's disease, conversion disorder, and pseudoseizures who presents to the emergency department for evaluation of cough, body aches, rash, and right elbow pain. Patient has had multiple ED visits in the last week (9 times) at several different emergency departments with extensive work-up for chest pain pathway and respiratory illness. Most of these visits  she has left AMA when not getting IV opioid medications. She had respiratory symptoms concerning for pneumonia vs covid infection with questionable episodes of hypoxia. She was noted to have an elevated troponin of 0.123 on 1/8/2021 at [place] ED and this eventually was thought to be related to hypoxia/demand and did trend downward. She had chest CT findings concerning for Covid but has had negative covid testing. Patient tells me she continues to have a bad cough. She is nauseated. She developed a rash on her chest today and it is pruritic. She denies any new medications. She awoke today with right elbow pain and has an area of redness. Pt reports episodes of abscess in the past that needed draining. She reports having a fever of 101 yesterday.     On exam patient is alert and oriented.  Afebrile.  Normotensive.  Tachycardia with heart rate 118.  Lung sounds are CTA.  No tachypnea.  No hypoxia.  Right elbow over the olecranon has a 3cm  x 3 cm area of erythema.  There is a 1 cm pustule at the center.  No significant fluctuance.  Given all her recent ED visits and concern for a worsening infection blood work was repeated today. No leukocytosis.Lactic acid is normal. Potassium is low at 3.0.   Lactic acid is normal.  Kidney function is normal. Blood Culture drawn and is pending.  Patient has had no episodes of hypoxia.  No indication for admission to the hospital.     Right elbow pustule and erythema is concerning for developing cellulitis.  I discussed options with patient to use warm compresses and start oral antibiotics otherwise we could I&D the pustule and send a wound culture.  Patient wanted to proceed with draining the pustule.  This was performed as noted above.  Wound culture is pending.  Patient was given IV normal saline 1 L bolus.  She remains normotensive.  Heart rate improved.  Patient was given p.o. potassium  (Kdur) 20 mEq and p.o. Keflex 500mg.     Right elbow pustule:  --possibly early abscess or  cellulitis.  --incision and drainage performed  --wound culture obtained and results should be done in 24 hours  --Keflex 500 mg 4 times a day for 10 days.  First dose given in the emergency department today.  --Warm compresses 4 times a day.  --return for increased redness or swelling.     Your potassium is mildly low today.  --Potassium chloride 20 mEq twice a day for the next 2 days.  --Your first dose was given here today in the emergency department  --Need to have your potassium rechecked early next week.  --I have attached a handout regarding foods high in potassium  The remainder of your labs and chest xray are not concerning for any serious infection at this time.    I have reviewed the nursing notes.    I have reviewed the findings, diagnosis, plan and need for follow up with the patient.      Discharge Medication List as of 1/14/2021  9:18 PM      START taking these medications    Details   cephALEXin (KEFLEX) 500 MG capsule Take 1 capsule (500 mg) by mouth 4 times daily for 10 days, Disp-28 capsule, R-0, E-Prescribe      potassium chloride ER (KLOR-CON M) 20 MEQ CR tablet Take 1 tablet (20 mEq) by mouth 2 times daily for 4 doses, Disp-4 tablet, R-0, E-Prescribe             Final diagnoses:   Viral URI   Rash   Skin pustule - right elbow, concerning for early cellulitis vs abscess.   Hypokalemia       1/14/2021   [facility] EMERGENCY DEPT     Belkis, NATA Brennan CNP  01/14/21 [id number]

## 2021-01-15 NOTE — ED NOTES
Pt reports pain in her right elbow that started when she woke up this morning, pt denies any injury to area. Pts right elbow is red and blanchable, no open areas noted. Elbow was elevated on pillow and provider notified.

## 2021-01-17 LAB
BACTERIA SPEC CULT: NORMAL
Lab: NORMAL
SPECIMEN SOURCE: NORMAL

## 2021-01-18 ENCOUNTER — TRANSFERRED RECORDS (OUTPATIENT)
Dept: HEALTH INFORMATION MANAGEMENT | Facility: CLINIC | Age: 31
End: 2021-01-18

## 2021-01-18 ENCOUNTER — TELEPHONE (OUTPATIENT)
Dept: FAMILY MEDICINE | Facility: CLINIC | Age: 31
End: 2021-01-18

## 2021-01-18 ENCOUNTER — MYC REFILL (OUTPATIENT)
Dept: FAMILY MEDICINE | Facility: CLINIC | Age: 31
End: 2021-01-18

## 2021-01-18 DIAGNOSIS — M25.521 RIGHT ELBOW PAIN: ICD-10-CM

## 2021-01-18 DIAGNOSIS — B37.31 CANDIDIASIS OF VAGINA: Primary | ICD-10-CM

## 2021-01-18 RX ORDER — OXYCODONE AND ACETAMINOPHEN 7.5; 325 MG/1; MG/1
1 TABLET ORAL EVERY 8 HOURS PRN
Qty: 8 TABLET | Refills: 0 | OUTPATIENT
Start: 2021-01-18

## 2021-01-18 RX ORDER — FLUCONAZOLE 150 MG/1
150 TABLET ORAL ONCE
Qty: 3 TABLET | Refills: 0 | Status: SHIPPED | OUTPATIENT
Start: 2021-01-18 | End: 2021-01-18

## 2021-01-18 NOTE — TELEPHONE ENCOUNTER
Pending Prescriptions:                       Disp   Refills    oxyCODONE-acetaminophen (PERCOCET) 7.5-325*8 tabl*0        Sig: Take 1 tablet by mouth every 8 hours as needed for           severe pain    Routing refill request to provider for review/approval because:  Drug not on the [facility] refill protocol

## 2021-01-18 NOTE — TELEPHONE ENCOUNTER
I will need to discuss what she means for a prior medication for MRI. This is typically not done by us but by ordering provider. It is either Valium for muscle relaxation/anxiety or a pain medication. Either way, is a controlled substance and would need an appointment for a prescription. She is on my schedule for tomorrow.     Emmett sent.     Zach Rosario PA-C  [facility]

## 2021-01-18 NOTE — TELEPHONE ENCOUNTER
"Patient is being represented by  at this time.     Patient is calling and states she had a video visit with [facility] in [place] this morning.  She stated they are going to be ordering an MRI and advised her to call her PCP for a \"prior need prescription medication\" approval for pain, on file, percocet.  She states left sided shoulder and head pain rating 6/10 at a constant.  She states her left arm feels like it is on fire.  Patient states symptoms are connected with 1/7/2021 fall documented by ED at [facility].    Patient verbalized she is requesting prescription, Diflucan, for a yeast infection she believes she is  developing since starting the antibiotic, Keflex on 1/14/2021.  ER provider prescribed Keflex at visit on 1/14/2021.    Patient stated she would like message to go to PCP as soon as possible.  Will forward to PCP for review.    Trini Cassidy RN    "

## 2021-01-19 ENCOUNTER — OFFICE VISIT (OUTPATIENT)
Dept: FAMILY MEDICINE | Facility: OTHER | Age: 31
End: 2021-01-19
Payer: MEDICARE

## 2021-01-19 ENCOUNTER — HOSPITAL ENCOUNTER (OUTPATIENT)
Dept: NUCLEAR MEDICINE | Facility: CLINIC | Age: 31
Setting detail: NUCLEAR MEDICINE
End: 2021-01-19
Attending: FAMILY MEDICINE
Payer: MEDICARE

## 2021-01-19 ENCOUNTER — HOSPITAL ENCOUNTER (OUTPATIENT)
Dept: CARDIOLOGY | Facility: CLINIC | Age: 31
Setting detail: NUCLEAR MEDICINE
End: 2021-01-19
Attending: FAMILY MEDICINE
Payer: MEDICARE

## 2021-01-19 VITALS
SYSTOLIC BLOOD PRESSURE: 116 MMHG | OXYGEN SATURATION: 98 % | DIASTOLIC BLOOD PRESSURE: 74 MMHG | TEMPERATURE: 98 F | RESPIRATION RATE: 16 BRPM | WEIGHT: 192 LBS | BODY MASS INDEX: 35.12 KG/M2 | HEART RATE: 111 BPM

## 2021-01-19 DIAGNOSIS — G89.29 CHRONIC BILATERAL LOW BACK PAIN WITH LEFT-SIDED SCIATICA: ICD-10-CM

## 2021-01-19 DIAGNOSIS — M25.521 RIGHT ELBOW PAIN: ICD-10-CM

## 2021-01-19 DIAGNOSIS — M54.42 CHRONIC BILATERAL LOW BACK PAIN WITH LEFT-SIDED SCIATICA: ICD-10-CM

## 2021-01-19 DIAGNOSIS — F32.0 MILD MAJOR DEPRESSION (H): ICD-10-CM

## 2021-01-19 DIAGNOSIS — U07.1 PNEUMONIA DUE TO 2019 NOVEL CORONAVIRUS: Primary | ICD-10-CM

## 2021-01-19 DIAGNOSIS — R29.6 FALLS FREQUENTLY: ICD-10-CM

## 2021-01-19 DIAGNOSIS — F31.63 BIPOLAR DISORDER, CURRENT EPISODE MIXED, SEVERE, WITHOUT PSYCHOTIC FEATURES (H): ICD-10-CM

## 2021-01-19 DIAGNOSIS — J12.82 PNEUMONIA DUE TO 2019 NOVEL CORONAVIRUS: Primary | ICD-10-CM

## 2021-01-19 DIAGNOSIS — B00.1 COLD SORE: ICD-10-CM

## 2021-01-19 DIAGNOSIS — E87.6 HYPOKALEMIA: ICD-10-CM

## 2021-01-19 DIAGNOSIS — R79.89 ELEVATED TROPONIN: ICD-10-CM

## 2021-01-19 DIAGNOSIS — F41.9 ANXIETY: ICD-10-CM

## 2021-01-19 LAB
ANION GAP SERPL CALCULATED.3IONS-SCNC: 4 MMOL/L (ref 3–14)
BUN SERPL-MCNC: 23 MG/DL (ref 7–30)
CALCIUM SERPL-MCNC: 9.6 MG/DL (ref 8.5–10.1)
CHLORIDE SERPL-SCNC: 104 MMOL/L (ref 94–109)
CO2 SERPL-SCNC: 29 MMOL/L (ref 20–32)
CREAT SERPL-MCNC: 0.86 MG/DL (ref 0.52–1.04)
CV BLOOD PRESSURE: 67 %
CV STRESS MAX HR HE: 105
GFR SERPL CREATININE-BSD FRML MDRD: 90 ML/MIN/{1.73_M2}
GLUCOSE SERPL-MCNC: 107 MG/DL (ref 70–99)
NUC STRESS EJECTION FRACTION: 79 %
POTASSIUM SERPL-SCNC: 4.2 MMOL/L (ref 3.4–5.3)
RATE PRESSURE PRODUCT: NORMAL
SODIUM SERPL-SCNC: 137 MMOL/L (ref 133–144)
STRESS ECHO BASELINE DIASTOLIC HE: 72
STRESS ECHO BASELINE HR: 84
STRESS ECHO BASELINE SYSTOLIC BP: 106
STRESS ECHO CALCULATED PERCENT HR: 56 %
STRESS ECHO LAST STRESS DIASTOLIC BP: 72
STRESS ECHO LAST STRESS SYSTOLIC BP: 108
STRESS ECHO TARGET HR: 189

## 2021-01-19 PROCEDURE — G1004 CDSM NDSC: HCPCS | Performed by: INTERNAL MEDICINE

## 2021-01-19 PROCEDURE — G1004 CDSM NDSC: HCPCS

## 2021-01-19 PROCEDURE — 93016 CV STRESS TEST SUPVJ ONLY: CPT | Performed by: INTERNAL MEDICINE

## 2021-01-19 PROCEDURE — 93018 CV STRESS TEST I&R ONLY: CPT | Mod: MG | Performed by: INTERNAL MEDICINE

## 2021-01-19 PROCEDURE — 93017 CV STRESS TEST TRACING ONLY: CPT

## 2021-01-19 PROCEDURE — 250N000011 HC RX IP 250 OP 636: Performed by: FAMILY MEDICINE

## 2021-01-19 PROCEDURE — A9502 TC99M TETROFOSMIN: HCPCS | Performed by: FAMILY MEDICINE

## 2021-01-19 PROCEDURE — 78452 HT MUSCLE IMAGE SPECT MULT: CPT | Mod: 26 | Performed by: INTERNAL MEDICINE

## 2021-01-19 PROCEDURE — 80048 BASIC METABOLIC PNL TOTAL CA: CPT | Performed by: PHYSICIAN ASSISTANT

## 2021-01-19 PROCEDURE — 36415 COLL VENOUS BLD VENIPUNCTURE: CPT | Performed by: PHYSICIAN ASSISTANT

## 2021-01-19 PROCEDURE — 343N000001 HC RX 343: Performed by: FAMILY MEDICINE

## 2021-01-19 PROCEDURE — 99215 OFFICE O/P EST HI 40 MIN: CPT | Performed by: PHYSICIAN ASSISTANT

## 2021-01-19 RX ORDER — VALACYCLOVIR HYDROCHLORIDE 1 G/1
TABLET, FILM COATED ORAL
Status: CANCELLED | OUTPATIENT
Start: 2021-01-19

## 2021-01-19 RX ORDER — REGADENOSON 0.08 MG/ML
0.4 INJECTION, SOLUTION INTRAVENOUS ONCE
Status: COMPLETED | OUTPATIENT
Start: 2021-01-19 | End: 2021-01-19

## 2021-01-19 RX ORDER — VALACYCLOVIR HYDROCHLORIDE 1 G/1
2000 TABLET, FILM COATED ORAL 2 TIMES DAILY
Qty: 4 TABLET | Refills: 3 | Status: SHIPPED | OUTPATIENT
Start: 2021-01-19 | End: 2021-08-23

## 2021-01-19 RX ORDER — OXYCODONE AND ACETAMINOPHEN 7.5; 325 MG/1; MG/1
1 TABLET ORAL
Qty: 8 TABLET | Refills: 0 | Status: SHIPPED | OUTPATIENT
Start: 2021-01-19 | End: 2021-07-21

## 2021-01-19 RX ADMIN — TETROFOSMIN 9.5 MCI.: 1.38 INJECTION, POWDER, LYOPHILIZED, FOR SOLUTION INTRAVENOUS at 09:05

## 2021-01-19 RX ADMIN — TETROFOSMIN 32.8 MCI.: 1.38 INJECTION, POWDER, LYOPHILIZED, FOR SOLUTION INTRAVENOUS at 10:28

## 2021-01-19 RX ADMIN — REGADENOSON 0.4 MG: 0.08 INJECTION, SOLUTION INTRAVENOUS at 10:13

## 2021-01-19 NOTE — TELEPHONE ENCOUNTER
Apt required, see other notes from today. Has apt tomorrow.    Zach Rosario PA-C  [facility]

## 2021-01-19 NOTE — PROGRESS NOTES
Assessment & Plan     ICD-10-CM    1. Pneumonia due to 2019 novel coronavirus  U07.1     J12.82    2. Hypokalemia  E87.6 Basic metabolic panel   3. Falls frequently  R29.6    4. Right elbow pain  M25.521 oxyCODONE-acetaminophen (PERCOCET) 7.5-325 MG per tablet   5. Chronic bilateral low back pain with left-sided sciatica  M54.42     G89.29    6. Cold sore  B00.1 valACYclovir (VALTREX) 1000 mg tablet   7. Bipolar disorder, current episode mixed, severe, without psychotic features (H)  F31.63    8. Mild major depression (H)  F32.0    9. Anxiety  F41.9      - Reviewed all ~9 ED visits from the past few weeks with patient  - Discussed need to follow medical advice & over utilization of healthcare   - Patient is feeling better   - She will need recheck of slight hypokalemia, likely due to vomiting/diarrhea and being very sick   - Await results     - Pain      Patient reported that her Buprenorphine has been stopped, patient thought it was due to her falls but mom reported it was actually stopped by Dr. Benitez due to increase in patient's liver function      Continues to work with [facility] regarding her back, fallen twice lately and reports re-injured her back, does have MRI scheduled for later this week      Patient is reporting that she needs some more Percocet, was given #8 from another provider for large hematoma on elbow      [facility] told her to talk to PCP      States can't sleep, wants it for at night      She is also on muscle relaxer, Gabapentin, Cymbalta, lidocaine patches, and medical cannabis      Reviewed , has not had any narcotics except for as above lately, just Buprenorphine, Gabapentin, and Klonopin      Discussed her history of drug seeking/narcotic dependence and how we don't want to go back down that road      Discussed I will give her #8 and no further, reviewed use and side effects including sedation, addiction, and constipation        Continue to follow with [facility]     - Requesting Valtrex she has  had in the past for cold sores, reviewed use and side effects, refilled     - Mood      Discussed need for further psychiatric care as patient often gets so worked up resulting in leaving AMA as ED visits above      Mom reports patient often doesn't take her medications and that's when gets worked up and very very angry      Both of us encouraged patient to see psychiatrist, patient additionally wants ADHD testing as she reports that her counselor recommended      We had discussed this previously, discussed I would not fill any other controlled substances for her so she would need this to come from a psychiatrist, I also want her to have psychiatry care in general as well as getting off the Klonopin, previously placed referral to [facility] for her     Review of prior external note(s) from - [facility]where information from [facility] reviewed  Review of the result(s) of each unique test - See list        All labs from 1/14/21 & 1/12/21   Additional HPI obtained from patient's mother   Diagnosis or treatment significantly limited by social determinants of health - Mental health    40 minutes spent on the date of the encounter doing chart review, history and exam, documentation and further activities as noted above    The patient indicates understanding of these issues and agrees with the plan.    Due to language barrier, an  was present during the history-taking and subsequent discussion (and for part of the physical exam) with this patient.    Return in about 1 month (around 2/19/2021).    Aura Rosario PA-C  [facility]     Katy Islas is a 30 year old female who presents to clinic today for the following health issues accompanied by her :    HPI     ED/UC Followup:    Facility:  [facility]  Date of visit: 1/08, 01/09  Reason for visit: fell - missed a step at home and fell hitting her head and knees             Also fall  "in the kitchen   Current Status: still very sore    - Told to get medication for MRI   - Fell, doesn't remember what happened      LOC, pain on face and elbow, hit back of head, hard time walking      Tried to get up but was hard, went to ED   - Talked to [facility] yesterday AM      Told to stop taking Buprenorphine - tpld might be causing her to pass out or too sick, doesn't know      Ordered MRI for neck on the left side and for low back      Needs to call them to set up     Sally & Dr. Benitez    - Had cardiac testing this AM   - Hoping to get injection   - Ran out of oxycodone     Told [facility] and they told her PCP would need to prescribe it and they \"can't\"        - Still on antibiotics, yeast infection     - [facility] - hasn't called them yet        Review of Systems   Constitutional, HEENT, cardiovascular, pulmonary, gi and gu systems are negative, except as otherwise noted.      Objective    /74   Pulse 111   Temp 98  F (36.7  C) (Temporal)   Resp 16   Wt 87.1 kg (192 lb)   SpO2 98%   BMI 35.12 kg/m    Body mass index is 35.12 kg/m .  Physical Exam   GENERAL APPEARANCE: healthy, alert and no distress  EYES: Eyes grossly normal to inspection, PERRLA, conjunctivae and sclerae without injection or discharge, EOM intact   RESP: Lungs clear to auscultation - no rales, rhonchi or wheezes    CV: Regular rates and rhythm, normal S1 S2, no S3 or S4, no murmur, click or rub, no peripheral edema and peripheral pulses strong and symmetric bilaterally   MS: No musculoskeletal defects are noted and gait is age appropriate without ataxia   SKIN: No suspicious lesions or rashes, hydration status appears adeuqate with normal skin turgor   PSYCH: Alert and oriented x3; speech- coherent , normal rate and volume; able to articulate logical thoughts, able to abstract reason, no tangential thoughts, no hallucinations or delusions, mentation appears normal, Mood is euthymic. Affect is appropriate for this mood state and " bright. Thought content is free of suicidal ideation, hallucinations, and delusions. Dress is adequate and upkept. Eye contact is good during conversation.       Diagnostics   Reviewed in Epic   See orders pending in Epic

## 2021-01-20 NOTE — RESULT ENCOUNTER NOTE
Savana Burns    Your results were normal. Likely the sodium issue was because you were sick.     The results are attached for your review.       Zach Rosaroi PA-C

## 2021-01-21 ENCOUNTER — MYC MEDICAL ADVICE (OUTPATIENT)
Dept: FAMILY MEDICINE | Facility: OTHER | Age: 31
End: 2021-01-21

## 2021-01-21 LAB
BACTERIA SPEC CULT: NO GROWTH
SPECIMEN SOURCE: NORMAL

## 2021-01-21 NOTE — TELEPHONE ENCOUNTER
Yes, just need GERMAN to be able to speak to her.     Zach Rosario PA-C  [facility]

## 2021-01-21 NOTE — TELEPHONE ENCOUNTER
RN review. Routing to provider to respond to patient.   Jose Finney RN, BSN  [place]  January 21, 2021

## 2021-01-21 NOTE — TELEPHONE ENCOUNTER
Patient informed.     Jose Finney RN, BSN  [place]  January 21, 2021

## 2021-01-25 ENCOUNTER — TELEPHONE (OUTPATIENT)
Dept: FAMILY MEDICINE | Facility: OTHER | Age: 31
End: 2021-01-25

## 2021-01-25 ENCOUNTER — MYC MEDICAL ADVICE (OUTPATIENT)
Dept: FAMILY MEDICINE | Facility: OTHER | Age: 31
End: 2021-01-25

## 2021-01-25 DIAGNOSIS — S06.0X0D CONCUSSION WITHOUT LOSS OF CONSCIOUSNESS, SUBSEQUENT ENCOUNTER: Primary | ICD-10-CM

## 2021-01-25 DIAGNOSIS — G43.009 MIGRAINE WITHOUT AURA AND WITHOUT STATUS MIGRAINOSUS, NOT INTRACTABLE: ICD-10-CM

## 2021-01-25 DIAGNOSIS — Z87.898 HISTORY OF PSEUDOSEIZURE: ICD-10-CM

## 2021-01-25 DIAGNOSIS — H53.8 BLURRED VISION: ICD-10-CM

## 2021-01-25 NOTE — TELEPHONE ENCOUNTER
Should not drive until vision is better and checked by eye doctor.     Since nothing was done through the state to stop her from driving, she technically can drive when she feels her symptoms have all resolved (of course pending above).    Zach Rosario PA-C  [facility]

## 2021-01-25 NOTE — TELEPHONE ENCOUNTER
Patient was informed and will reach out to insurance to see if she can coordinate her MRI appointment. IF she can't she will call us back.     Jose Finney RN, BSN  [place]  January 25, 2021

## 2021-01-25 NOTE — TELEPHONE ENCOUNTER
Patient calling with a question for Zach.     Patient stated that she had a concussion 2.5-3 weeks ago. She was wondering when she can be cleared to go back to work and start driving again?   Currently she is still having left eye blurriness. I informed her another provider she could check with is an ophthalmologist. Patient stated she will reach out for them.   She is still having head pressure, but during conversation had no symptoms.     Writer and patient discussed the COVID vaccine. Informed her that we do know have the vaccine yet and I am unsure when we will get it. I advised her to check on the [place] Department of health website for further advisement.     Jose Finney RN, BSN  [place]  January 25, 2021

## 2021-01-26 ENCOUNTER — MYC MEDICAL ADVICE (OUTPATIENT)
Dept: FAMILY MEDICINE | Facility: OTHER | Age: 31
End: 2021-01-26

## 2021-01-26 NOTE — TELEPHONE ENCOUNTER
Left message for the patient to return call to the clinic.   Sent mychart message to the patient.       Jose Finney RN, BSN  [place] [facility]  January 26, 2021

## 2021-01-26 NOTE — TELEPHONE ENCOUNTER
Patient returned call to the writer.     Patient stated that yesterday/last night she had the worse headache ever. Today she is feeling much better, but is wondering if she should have a neurology evaluation?     Patient states that her pain was located on the front side of her head and down neck. She stated that she felt a lot of pressure and like her head was a balloon. Pain was rated at a 8/10 and the patient was crying. Her neck pain was so bad she could barely move her head. Patient states that her left eye is still blurry. She felt like the room was spinning when she looked around. Loud noises created a ringing in her ears.   She was having a conversation with her mom last evening. Her mom informed her that what she was saying wasn't making any sense. Katy stated that she doesn't remember talking with her mom or anything that she said.     During this conversation that occurred today 1/26/21 patient was not currently indicating that she was confused or was having a seizure. Patient stated that she was seen by Neurology through [facility]. Patient stated she will call them to see if she can schedule an appointment. Writer informed her that I will update Zach and have her place a referral if needed.     PLAN:   Huddle with provider.     Jose Finney, RN, BSN  [place]  January 26, 2021

## 2021-01-26 NOTE — TELEPHONE ENCOUNTER
Called to discuss symptoms further.   Left second message for the patient to return call to the clinic.   Please triage symptoms if writer is not available. Then route triage note to PCP.       Guideline used:Headache  Telephone Triage Protocols for Nurses, Fifth Edition, Telma Finney RN, BSN  [place]  January 26, 2021

## 2021-01-27 ENCOUNTER — MYC MEDICAL ADVICE (OUTPATIENT)
Dept: FAMILY MEDICINE | Facility: OTHER | Age: 31
End: 2021-01-27

## 2021-01-27 DIAGNOSIS — I10 HYPERTENSION GOAL BP (BLOOD PRESSURE) < 130/80: ICD-10-CM

## 2021-01-27 DIAGNOSIS — S06.0X0D CONCUSSION WITHOUT LOSS OF CONSCIOUSNESS, SUBSEQUENT ENCOUNTER: ICD-10-CM

## 2021-01-27 DIAGNOSIS — G43.009 MIGRAINE WITHOUT AURA AND WITHOUT STATUS MIGRAINOSUS, NOT INTRACTABLE: Primary | ICD-10-CM

## 2021-01-27 DIAGNOSIS — F31.63 BIPOLAR DISORDER, CURRENT EPISODE MIXED, SEVERE, WITHOUT PSYCHOTIC FEATURES (H): ICD-10-CM

## 2021-01-27 DIAGNOSIS — K50.10 CROHN'S DISEASE OF COLON WITHOUT COMPLICATION (H): ICD-10-CM

## 2021-01-27 DIAGNOSIS — F32.0 MILD MAJOR DEPRESSION (H): ICD-10-CM

## 2021-01-27 DIAGNOSIS — Z87.898 HISTORY OF PSEUDOSEIZURE: ICD-10-CM

## 2021-01-27 NOTE — TELEPHONE ENCOUNTER
Referral placed for neurology at [facility]. We may need to get a fax number to send it.     Please let patient know.    Zach Rosario PA-C  [facility]

## 2021-01-27 NOTE — TELEPHONE ENCOUNTER
Called to discuss with patient further.     Patient mentions that she still has a headache on the left side. Currently the patient rates her pain a 4/10. She also states that she has left eye pressure rated at a 5/10. She see's the ophthalmologist next week. She currently is exhibiting no additional symptoms.   She has been using IBU, rest, sleep, and a warm cloth. Informed her to continue to use the same home care measures. Will ask Zach if she recommends anything else?     We discussed scheduling neurology appointment. She stated that she tried calling to schedule, but they don't have a head CT. Also they stated that they were waiting on the CT [facility]. According to [facility] on 1/9/21 there is documentation of the CT of face is normal.   Writer tried calling to schedule appointment for the patient, but they took her information and informed me they will call in 25-30 MIN.       PLAN:   Huddle with provider regarding symptoms and home care.   Writer is also requesting care coordination to become involved in the patient care if the patient is willing to work with a care coordinator. If PCP is okay with that, then writer can discuss with the patient then inform PCP of her response. Please advise.       Jose Finney RN, BSN  [place]  January 27, 2021

## 2021-01-27 NOTE — TELEPHONE ENCOUNTER
[facility] fax [phone]. Notified patient also.

## 2021-01-28 NOTE — TELEPHONE ENCOUNTER
She should make sure to be on brain rest with little to no screen time. Nap as much as she can. And continue with rest of cares as suggested by RN.     Certainly, can try to bring up care coordination again. I have tried several times and she always refuses. Most of the time I end up having a TC take care of scheduling for her.     There is a CT head/brain from 1/8/21 from [place] ([facility]), that is likely what they will need. If we continue to get issues with her getting scheduled, I would recommend she just see [facility] neurology in [place].     Zach Rosario PA-C  [facility]

## 2021-01-28 NOTE — TELEPHONE ENCOUNTER
Spoke with patient today. Will route this encounter to PCP and [facility].     :   Please schedule patient for any day or time with [facility]. This works better for the patient to be seen there and writer has been having issues trying to get her scheduled up at [facility].     Zach:  Writer spoke to patient about Care Coordination and what they do. At first, the patient declined. Then we talked more about how they can assist with scheduling appointments and offer additional resources for a variety of things. Then, the patient became more interested. Would you please place a referral for her to get started with Care Coordination?     Jose Finney, RN, BSN  [place]  January 28, 2021

## 2021-01-29 ENCOUNTER — TELEPHONE (OUTPATIENT)
Dept: NEUROLOGY | Facility: CLINIC | Age: 31
End: 2021-01-29

## 2021-01-29 ENCOUNTER — MYC MEDICAL ADVICE (OUTPATIENT)
Dept: NEUROLOGY | Facility: CLINIC | Age: 31
End: 2021-01-29

## 2021-01-29 NOTE — TELEPHONE ENCOUNTER
I called patient and scheduled her with Dr. Hernandez for 60 min-new problem.    Roxanne Pearl LPN

## 2021-01-29 NOTE — PROGRESS NOTES
Katy is a 30 year old who is being evaluated via a billable telephone visit.      What phone number would you like to be contacted at? [phone]  How would you like to obtain your AVS? MyChart     Assessment & Plan     ICD-10-CM    1. Migraine with aura and without status migrainosus, not intractable  G43.109 rizatriptan (MAXALT-MLT) 5 MG ODT   2. Concussion with loss of consciousness, subsequent encounter  S06.0X9D      - Patient continues to deal with symptoms of concussion   - Sees neurology on 2/12/21   - Has a very bad migraine today and was not amenable to visit      Kept saying head hurt bad and wouldn't look at interpretor   - Stated had issues getting her maxalt, stuff she has is old   - Will send refill for her     Reviewed use and side effects   - Patient chose to end visit     Review of the result(s) of each unique test - See list      Labs from ED visit in Care Everywhere from yesterday   Diagnosis or treatment significantly limited by social determinants of health - none     8 minutes spent on the date of the encounter doing chart review, history and exam, documentation and further activities as noted above    Due to language barrier, an  was present during the history-taking and subsequent discussion with this patient.      The patient indicates understanding of these issues and agrees with the plan.    Return in about 2 weeks (around 2/17/2021).    [facility]    Alex Burns is a 30 year old who presents to clinic today for the following health issues     HPI   ED/ Followup:    Facility:  [facility]   Date of visit: 2/2/21  Reason for visit: Fever, palpitations    Current Status: see below     Pt thinks she passed out or had a seizure and fell into refrigerator (in early January 9th).  She is having a lot of pain on the left side of her face,  states pt eyes are barely open.  Pt states she is  having dizziness, confusion, memory issues and pain since the fall. She isn't sure when she fell first she said January 1 and then January 9, the light from the screen for  is hurting her eyes.     - Dizzy, trying to rest and sleep as much as possible   - Bad headache   - Has neurology scheduled   - Doesn't remember yesterday at all              RN Triage from yesterday   Spoke to patient. Had an  on the phone as well. Patient had COVID 3 weeks ago. She also suffered a concussion a few weeks ago. She has not been feeling well today. She feels dizzy, lightheaded, and weak. She has an automatic BP cuff at home. Her BP is 90/66 with a pulse of 135. Patient feels like her heart is racing and feels palpitations.      Per protocol, patient should be seen in the ED. Patient agrees. She said the [place] ED is closest and she will head there now.      EDYTA ChatterjeeN, RN  [facility]          Review of Systems   Constitutional, HEENT, cardiovascular, pulmonary, gi and gu systems are negative, except as otherwise noted.      Objective       Vitals:  No vitals were obtained today due to virtual visit.    Physical Exam   healthy, alert and no distress  PSYCH: Alert and oriented times 3; coherent speech, normal   rate and volume, able to articulate logical thoughts, able   to abstract reason, no tangential thoughts, no hallucinations   or delusions  Her affect is normal  RESP: No cough, no audible wheezing, able to talk in full sentences  Remainder of exam unable to be completed due to telephone visits      Diagnostics  Reviewed in Epic   None        Phone call duration: 8 minutes

## 2021-01-29 NOTE — TELEPHONE ENCOUNTER
Health Call Center    Phone Message    May a detailed message be left on voicemail: yes     Reason for Call: Appointment Intake    Referring Provider Name: EMILY Rosario from [place]  Diagnosis and/or Symptoms: Migraine without aura and without status migrainosus, not intractable [G43.009]  Concussion without loss of consciousness, subsequent encounter [S06.0X0D]  History of pseudoseizure [Z86.69]    Pt would prefer to go to [facility], if appropriate with the conditions listed. Pt last seen in  Neurology July 2017. Pt is hearing impaired and connects with our clinics via [employer], only. Pt would like any questions directed to her via [employer] if needed. Pt requesting date/time/location for her appt via [employer].     Med Recs in [employer] via [employer] system.    Please review and MyChart pt to help move her care forward. Thank you.    Action Taken: Message routed to:  [facility]    Travel Screening: Not applicable.

## 2021-01-29 NOTE — TELEPHONE ENCOUNTER
MY chart message sent to patient requesting more information on who she would like to see and when.    Roxanne Pearl LPN

## 2021-01-29 NOTE — TELEPHONE ENCOUNTER
Spoke to Neurology Scheduling and they are going to be reviewing referral and chart and will my chart patient with information/ date/time once completed.. I sent patient this message as well.

## 2021-01-30 ENCOUNTER — MYC REFILL (OUTPATIENT)
Dept: FAMILY MEDICINE | Facility: OTHER | Age: 31
End: 2021-01-30

## 2021-01-30 DIAGNOSIS — F41.9 ANXIETY: ICD-10-CM

## 2021-01-30 DIAGNOSIS — F32.0 MILD MAJOR DEPRESSION (H): ICD-10-CM

## 2021-02-01 ENCOUNTER — MYC MEDICAL ADVICE (OUTPATIENT)
Dept: FAMILY MEDICINE | Facility: OTHER | Age: 31
End: 2021-02-01

## 2021-02-01 ENCOUNTER — NURSE TRIAGE (OUTPATIENT)
Dept: FAMILY MEDICINE | Facility: OTHER | Age: 31
End: 2021-02-01

## 2021-02-01 DIAGNOSIS — G44.209 TENSION HEADACHE: Primary | ICD-10-CM

## 2021-02-01 RX ORDER — NAPROXEN 500 MG/1
500 TABLET ORAL 2 TIMES DAILY WITH MEALS
Qty: 14 TABLET | Refills: 0 | Status: ON HOLD | OUTPATIENT
Start: 2021-02-01 | End: 2021-08-27

## 2021-02-01 RX ORDER — CLONAZEPAM 1 MG/1
1 TABLET ORAL 2 TIMES DAILY PRN
Qty: 60 TABLET | Refills: 0 | Status: SHIPPED | OUTPATIENT
Start: 2021-02-04 | End: 2021-02-24

## 2021-02-01 NOTE — TELEPHONE ENCOUNTER
Pending Prescriptions:                       Disp   Refills    clonazePAM (KLONOPIN) 1 MG tablet          60 tab*0        Sig: Take 1 tablet (1 mg) by mouth 2 times daily as needed           for anxiety        Routing refill request to provider for review/approval because:  Drug not on the [facility] refill protocol   Last Seen: 1/19/21  Last Refilled: 1/5/21   Next Visit:   Next 5 appointments (look out 90 days)    Feb 12, 2021 12:30 PM  Return Visit with Isabelle Villanueva MD  [facility] ([facility] ) [address] [place] [id number]  [phone]   Feb 24, 2021 10:30 AM  Return Visit with Sandra Hou MD  [facility] ([facility] ) [address] [place] [id number]  [phone]          Jose Finney RN, BSN  [place]  February 1, 2021

## 2021-02-01 NOTE — TELEPHONE ENCOUNTER
"Writer called patient to discuss symptoms further. Zach patient is requesting Naproxen for her migraines. Please advise. Patient uses [facility] in [place].     1. LOCATION: \"Where does it hurt?\"       Near temples, primarily on the left side.   2. ONSET: \"When did the headache start?\" (Minutes, hours or days)       This AM  3. PATTERN: \"Does the pain come and go, or has it been constant since it started?\"      Intermittent. Unable to sleep for the past two night.   4. SEVERITY: \"How bad is the pain?\" and \"What does it keep you from doing?\"  (e.g., Scale 1-10; mild, moderate, or severe)    - MILD (1-3): doesn't interfere with normal activities     - MODERATE (4-7): interferes with normal activities or awakens from sleep     - SEVERE (8-10): excruciating pain, unable to do any normal activities         5/10  5. RECURRENT SYMPTOM: \"Have you ever had headaches before?\" If so, ask: \"When was the last time?\" and \"What happened that time?\"       Yes  6. CAUSE: \"What do you think is causing the headache?\"      Head Injury  7. MIGRAINE: \"Have you been diagnosed with migraine headaches?\" If so, ask: \"Is this headache similar?\"       Yes  8. HEAD INJURY: \"Has there been any recent injury to the head?\"       Patient has been dealing with ongoing symptoms due to a bad fall. She was referred to Neuro and has an appointment on 2/12/21 in [place].   9. OTHER SYMPTOMS: \"Do you have any other symptoms?\" (fever, stiff neck, eye pain, sore throat, cold symptoms)      No fever, stiff neck, sore throat, or cold symptoms. Denies feelings of passing out. No nausea or vomiting.   Yes, patient is still having left eye pain rated at 5/10 with a little bit of blurriness.   10. PREGNANCY: \"Is there any chance you are pregnant?\" \"When was your last menstrual period?\"        NA    PLAN:   Huddle with provider.     Jose Finney RN, BSN  [place]  February 1, 2021    Additional Information    Negative: Difficult " to awaken or acting confused (e.g., disoriented, slurred speech)    Negative: Weakness of the face, arm or leg on one side of the body and new onset    Negative: Numbness of the face, arm or leg on one side of the body and new onset    Negative: Loss of speech or garbled speech and new onset    Negative: Passed out (i.e., fainted, collapsed and was not responding)    Negative: Sounds like a life-threatening emergency to the triager    Negative: Followed a head injury within last 3 days    Negative: Traumatic Brain Injury (TBI) is suspected    Negative: Sinus pain of forehead and yellow or green nasal discharge    Negative: Pregnant    Negative: Unable to walk without falling    Negative: Stiff neck (can't touch chin to chest)    Negative: Possibility of carbon monoxide exposure    Negative: SEVERE headache, states 'worst headache' of life    Negative: SEVERE headache, sudden onset (i.e., reaching maximum intensity within 30 seconds)    Negative: Severe pain in one eye    Negative: Loss of vision or double vision    Negative: Patient sounds very sick or weak to the triager    Negative: Fever > 103 F (39.4 C)    Negative: Fever > 100.0 F (37.8 C) and has diabetes mellitus or a weak immune system (e.g., HIV positive, cancer chemotherapy, organ transplant, splenectomy, chronic steroids)    Protocols used: HEADACHE-A-OH

## 2021-02-01 NOTE — TELEPHONE ENCOUNTER
Sent message through [employer].     Jose Finney RN, BSN  [place]  February 1, 2021

## 2021-02-01 NOTE — TELEPHONE ENCOUNTER
Last fill 1/5/21  Will send refill for 2/4/21.    reviewed.     Zach Rosario PA-C  [facility]

## 2021-02-01 NOTE — TELEPHONE ENCOUNTER
I will send Naproxen for her to try but she shouldn't take it more than a few days because it could cause another GI bleed/ulcer. She needs to make sure she is taking her Protonix while on this medication.    Zach Rosario PA-C  [facility]

## 2021-02-02 ENCOUNTER — NURSE TRIAGE (OUTPATIENT)
Dept: FAMILY MEDICINE | Facility: OTHER | Age: 31
End: 2021-02-02

## 2021-02-02 ENCOUNTER — TRANSFERRED RECORDS (OUTPATIENT)
Dept: HEALTH INFORMATION MANAGEMENT | Facility: CLINIC | Age: 31
End: 2021-02-02

## 2021-02-02 LAB
CREAT SERPL-MCNC: 0.94 MG/DL (ref 0.57–1.11)
GFR SERPL CREATININE-BSD FRML MDRD: >60 ML/MIN/1.73M2
GLUCOSE SERPL-MCNC: 91 MG/DL (ref 70–100)
POTASSIUM SERPL-SCNC: 3.7 MMOL/L (ref 3.5–5.1)

## 2021-02-02 NOTE — TELEPHONE ENCOUNTER
"Spoke to patient. Had an  on the phone as well. Patient had COVID 3 weeks ago. She also suffered a concussion a few weeks ago. She has not been feeling well today. She feels dizzy, lightheaded, and weak. She has an automatic BP cuff at home. Her BP is 90/66 with a pulse of 135. Patient feels like her heart is racing and feels palpitations.     Per protocol, patient should be seen in the ED. Patient agrees. She said the [place] ED is closest and she will head there now.     RADHA Chatterjee, RN  [facility]      Reason for Disposition    Systolic BP < 90 and feeling dizzy, lightheaded, or weak    Extra heart beats or heart is beating fast  (i.e., \"palpitations\")    Additional Information    Negative: Started suddenly after an allergic medicine, an allergic food, or bee sting    Negative: Shock suspected (e.g., cold/pale/clammy skin, too weak to stand, low BP, rapid pulse)    Negative: Difficult to awaken or acting confused  (e.g., disoriented, slurred speech)    Negative: Fainted    Negative: Chest pain    Negative: Bleeding (e.g., vomiting blood, rectal bleeding or tarry stools, severe vaginal bleeding)    Answer Assessment - Initial Assessment Questions  1. BLOOD PRESSURE: \"What is the blood pressure?\" \"Did you take at least two measurements 5 minutes apart?\"      90/66, pulse 135  2. ONSET: \"When did you take your blood pressure?\"      Just now  3. HOW: \"How did you obtain the blood pressure?\" (e.g., visiting nurse, automatic home BP monitor)      Automatic BP cuff  4. HISTORY: \"Do you have a history of low blood pressure?\" \"What is your blood pressure normally?\"      yes  5. MEDICATIONS: \"Are you taking any medications for blood pressure?\" If yes: \"Have they been changed recently?\"      Water pill  6. PULSE RATE: \"Do you know what your pulse rate is?\"       135  7. OTHER SYMPTOMS: \"Have you been sick recently?\" \"Have you had a recent injury?\"      " "Concussion a few weeks ago, had COVID 3 weeks ago  8. PREGNANCY: \"Is there any chance you are pregnant?\" \"When was your last menstrual period?\"      no    Protocols used: LOW BLOOD PRESSURE-A-OH    "

## 2021-02-03 ENCOUNTER — TELEPHONE (OUTPATIENT)
Dept: FAMILY MEDICINE | Facility: OTHER | Age: 31
End: 2021-02-03

## 2021-02-03 ENCOUNTER — VIRTUAL VISIT (OUTPATIENT)
Dept: FAMILY MEDICINE | Facility: OTHER | Age: 31
End: 2021-02-03
Payer: MEDICARE

## 2021-02-03 DIAGNOSIS — G43.109 MIGRAINE WITH AURA AND WITHOUT STATUS MIGRAINOSUS, NOT INTRACTABLE: Primary | ICD-10-CM

## 2021-02-03 DIAGNOSIS — S06.0X9D CONCUSSION WITH LOSS OF CONSCIOUSNESS, SUBSEQUENT ENCOUNTER: ICD-10-CM

## 2021-02-03 PROCEDURE — 99441 PR PHYSICIAN TELEPHONE EVALUATION 5-10 MIN: CPT | Mod: 95 | Performed by: PHYSICIAN ASSISTANT

## 2021-02-03 RX ORDER — RIZATRIPTAN BENZOATE 5 MG/1
5-10 TABLET, ORALLY DISINTEGRATING ORAL
Qty: 18 TABLET | Refills: 3 | Status: SHIPPED | OUTPATIENT
Start: 2021-02-03 | End: 2022-02-09

## 2021-02-03 NOTE — TELEPHONE ENCOUNTER
Patient was referred on to [facility], due to scheduling issues with [facility]. Referral to [facility] was cancelled.     Jose Finney RN, BSN  [place]  February 3, 2021

## 2021-02-03 NOTE — TELEPHONE ENCOUNTER
Got a call from [facility] - asking if they should close out encounter as she needs the following before they can see her.    She needs to be seen by concussion before they can see her.     Looks like she is seeing neurology through [facility] - she would need to stop seeing them and all their notes sent to them.     Review and call to let them know if we are going to help facilitate additional appts that are needed.    [phone] Option 1 to reach their office

## 2021-02-12 ENCOUNTER — OFFICE VISIT (OUTPATIENT)
Dept: NEUROLOGY | Facility: CLINIC | Age: 31
End: 2021-02-12
Payer: MEDICARE

## 2021-02-12 VITALS
DIASTOLIC BLOOD PRESSURE: 76 MMHG | BODY MASS INDEX: 34.96 KG/M2 | OXYGEN SATURATION: 95 % | HEART RATE: 82 BPM | HEIGHT: 62 IN | WEIGHT: 190 LBS | SYSTOLIC BLOOD PRESSURE: 115 MMHG

## 2021-02-12 DIAGNOSIS — F44.5 PSYCHOGENIC NONEPILEPTIC SEIZURE: ICD-10-CM

## 2021-02-12 DIAGNOSIS — R55 TRANSIENT LOSS OF CONSCIOUSNESS: Primary | ICD-10-CM

## 2021-02-12 PROCEDURE — 99215 OFFICE O/P EST HI 40 MIN: CPT | Performed by: PSYCHIATRY & NEUROLOGY

## 2021-02-12 ASSESSMENT — PAIN SCALES - GENERAL: PAINLEVEL: NO PAIN (0)

## 2021-02-12 ASSESSMENT — MIFFLIN-ST. JEOR: SCORE: 1530.08

## 2021-02-12 NOTE — LETTER
"    2021         RE: Katy Islas  [address]  [place] [address]        Dear Colleague,    Thank you for referring your patient, Katy Islas, to the [facility] [place]. Please see a copy of my visit note below.      [facility]/[place] [facility] History and Physical         Patient:Katy Islas  : 1990  Age: 30 year old  Today's Office Visit: 2021    History of present illness:     Ms. Katy Islas is participating in this visit accompanied by IS .  I saw her once in 2017 and she mainly wanted her DMV form to be signed in that visit.  She had a evaluation at [facility] when she was younger and had episodes without EEG correlation and was told that she had psychogenic nonepileptic spells.    Today she tells me she passed out and fell and hit her head on 2021. She lives alone.  She has been having headaches since then. Her friends have noted she has been dozing off.  She doesn't remember what transpires during those times.  She doesn't remember if she had dizziness, palpitation chest pain or any other symptoms prior to fall.  She has noted she is tired all the time. CT head was negative on 2021.  She was found to have pneumonia when she went to the hospital.     She complains of imbalance when she walks. Her friend tell her that she keeps falling, but she doesn't remember.  She can't tell how often it happens. It's much better than it was before.  She sometimes bites her tongue, no other injuries or UI.    Seizure history: based on note on 17: \"She doesn't recall her early seizures.  Her mom states she started having seizures when she was 11 or 12 year of age.  During her seizures, she was staring off.  She would kick her legs and bite her clothes.  Mom denies shaking or stiffening.  She was seen at [facility] and was started on Depakote.  Despite increasing the dose, there was no " "improvement in her seizures, therefore she was admitted to [facility] in [place] and had prolonged video EEG monitoring.  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  She was put on antidepressants and was followed by a psychologist and spells improved.  3 years ago, she had a few convulsions.  At that time she was started on Tramadol for back pain.  With one of the convulsions, she had respiratory depression and aspiration pneumonia, and got intubated and was in the ICU for several days.    She had several other seizures while she was taking Tramadol.  She was not aware that Tramadol would increase her seizures.  When Tramadol stopped, she did not have any more seizures.  Last seizure was March 2015.\"    She is taking  mg tid for \"nerve pain\".    Past Medical History:  Asthma, fibromyalgia, crohn's disease.     Family History:  nonrelevant    Social History: she went to college for 4 years.  She denies drinking. She smokes 1/2 PPD, denies illicit drugs. She is single, has no children.     Current Outpatient Medications   Medication Sig Dispense Refill     albuterol (PROAIR HFA/PROVENTIL HFA/VENTOLIN HFA) 108 (90 Base) MCG/ACT inhaler Inhale 2 puffs into the lungs every 6 hours as needed for shortness of breath / dyspnea or wheezing 3 Inhaler 3     albuterol (PROVENTIL) (2.5 MG/3ML) 0.083% neb solution Take 1 vial (2.5 mg) by nebulization every 6 hours as needed for shortness of breath / dyspnea or wheezing 50 vial 11     alum & mag hydroxide-simethicone (MAALOX ADVANCED MAX ST) 400-400-40 MG/5ML SUSP suspension Take 15 mLs by mouth every 4 hours as needed for indigestion (mix with lidocaine) 355 mL 11     ARIPiprazole (ABILIFY) 30 MG tablet Take 1 tablet (30 mg) by mouth At Bedtime 30 tablet 1     aspirin (ASA) 81 MG tablet Take 1 tablet (81 mg) by mouth daily 90 tablet 3     azelastine (OPTIVAR) 0.05 % " ophthalmic solution Place 1 drop into both eyes 2 times daily 6 mL 11     buprenorphine (SUBUTEX) 2 MG SUBL sublingual tablet Place 2 mg under the tongue 3 times daily        buPROPion (WELLBUTRIN XL) 300 MG 24 hr tablet Take 1 tablet (300 mg) by mouth every morning 90 tablet 1     busPIRone (BUSPAR) 15 MG tablet Take 1 tablet (15 mg) by mouth 2 times daily 60 tablet 5     clonazePAM (KLONOPIN) 1 MG tablet Take 1 tablet (1 mg) by mouth 2 times daily as needed for anxiety 60 tablet 0     cyclobenzaprine (FLEXERIL) 10 MG tablet Take 1 tablet (10 mg) by mouth 3 times daily as needed for muscle spasms or other (back pain) 90 tablet 3     dicyclomine (BENTYL) 20 MG tablet Take 1 tablet (20 mg) by mouth every 6 hours 90 tablet 3     diphenhydrAMINE (BENADRYL) 50 MG capsule Take 1-2 capsules ( mg) by mouth every 6 hours as needed for itching, allergies or sleep 90 capsule 3     DULoxetine (CYMBALTA) 60 MG EC capsule Take 1 capsule (60 mg) by mouth daily (Patient taking differently: Take 60 mg by mouth At Bedtime ) 90 capsule 3     EPINEPHrine (ANY BX GENERIC EQUIV) 0.3 MG/0.3ML injection 2-pack Inject 0.3 mLs (0.3 mg) into the muscle once as needed for anaphylaxis 0.6 mL 3     furosemide (LASIX) 20 MG tablet Take 1 tablet (20 mg) by mouth daily as needed (edema) 30 tablet 4     gabapentin (NEURONTIN) 300 MG capsule Take 1 capsule (300 mg) by mouth 3 times daily (Patient taking differently: Take 300 mg by mouth 4 times daily ) 270 capsule 3     hydrochlorothiazide (HYDRODIURIL) 12.5 MG tablet Take 1 tablet (12.5 mg) by mouth daily 30 tablet 1     medical cannabis (Patient's own supply) (The purpose of this order is to document that the patient reports taking medical cannabis.  This is not a prescription, and is not used to certify that the patient has a qualifying medical condition.) 0 Information only 0     medroxyPROGESTERone (DEPO-PROVERA) 150 MG/ML IM injection Inject 1 mL (150 mg) into the muscle every 3 months 3  mL 3     methocarbamol (ROBAXIN) 750 MG tablet TAKE ONE TABLET BY MOUTH THREE TIMES A DAY AS NEEDED (Patient taking differently: Take 750 mg by mouth 3 times daily as needed for muscle spasms ) 60 tablet 5     Multiple Vitamins-Minerals (EQ MULTIVITAMINS ADULT GUMMY) CHEW Take 1 chew tab by mouth daily       naproxen (NAPROSYN) 500 MG tablet Take 1 tablet (500 mg) by mouth 2 times daily (with meals) 14 tablet 0     NONFORMULARY Apply 30 mLs topically 4 times daily       ondansetron (ZOFRAN-ODT) 8 MG ODT tab Take 1 tablet (8 mg) by mouth every 8 hours as needed for nausea 30 tablet 4     pantoprazole (PROTONIX) 40 MG EC tablet Take 1 tablet (40 mg) by mouth 2 times daily Take 30-60 minutes before a meal. 180 tablet 3     valACYclovir (VALTREX) 1000 mg tablet 2 tabs at onset of mouth sores.  Repeat dose in 12 hours.       carisoprodol (SOMA) 350 MG tablet Take 1 tablet (350 mg) by mouth 3 times daily as needed for muscle spasms (Patient not taking: Reported on 1/5/2021) 10 tablet 0     diclofenac (VOLTAREN) 1 % topical gel Place 2 g onto the skin 4 times daily as needed for moderate pain Stop if any gi upset or symptoms (Patient not taking: Reported on 1/5/2021) 100 g 0     fexofenadine (ALLEGRA) 180 MG tablet Take 1 tablet (180 mg) by mouth daily (Patient not taking: Reported on 1/5/2021) 90 tablet 3     hyoscyamine (LEVSIN) 0.125 MG tablet        lamoTRIgine (LAMICTAL) 25 MG tablet        lidocaine (XYLOCAINE) 2 % solution Swish and swallow 15 mLs in mouth every 4 hours as needed for other (GERD) ; Max 8 doses/24 hour period. Mix with 15 mLs Maalox or Mylanta. (Patient not taking: Reported on 2/12/2021) 100 mL 3     oxyCODONE-acetaminophen (PERCOCET) 7.5-325 MG per tablet Take 1 tablet by mouth nightly as needed for severe pain (Patient not taking: Reported on 2/12/2021) 8 tablet 0     prochlorperazine (COMPAZINE) 10 MG tablet Take 1 tablet (10 mg) by mouth every 6 hours as needed for nausea or vomiting 60 tablet 11  "    rizatriptan (MAXALT-MLT) 5 MG ODT Take 1-2 tablets (5-10 mg) by mouth at onset of headache for migraine May repeat in 2 hours. Max 6 tablets/24 hours. (Patient not taking: Reported on 2/12/2021) 18 tablet 3     valACYclovir (VALTREX) 1000 mg tablet Take 2 tablets (2,000 mg) by mouth 2 times daily for 1 day 4 tablet 3     Review of Systems:    She sometimes has hard time sleeping, has imbalance, she is very tired during the day. Denies swallowing difficulty, sometimes feels shortness of breath.     Exam:    /76   Pulse 82   Ht 1.575 m (5' 2\")   Wt 86.2 kg (190 lb)   SpO2 95%   BMI 34.75 kg/m       Wt Readings from Last 5 Encounters:   02/12/21 86.2 kg (190 lb)   01/19/21 87.1 kg (192 lb)   01/15/21 91.6 kg (202 lb)   01/14/21 90.3 kg (199 lb)   01/05/21 90.5 kg (199 lb 9.6 oz)     General Appearance: Alert, awake, no apparent distress  Neurological exam   Mental status: alert and oriented  Language/speech:  Communicates in sign language, follows commands  Cranial nerves: Pupils are round and reactive to light extraocular movements are intact, no nystagmus, facial sensation is intact to light touch, face symmetric, hearing loss bilaterally, Shrug shoulder is normal, tongue is midline.  Motor exam: Normal tone, bulk and strength 5 out of 5 bilaterally, no pronator drift  Sensation: Intact to light touch.   Coordination: Normal finger-to-nose   DTRs: symmetric, toes are downgoing  Gait and tandem gait: Steady    Assessment/Plan:    30-year-old right-handed woman with history of psychogenic nonepileptic spells, recent spells of fall and spacing out.  Differential diagnoses for recent spells include psychogenic nonepileptic spells, syncope and focal impaired aware seizures.  I suggested to check her blood pressure during these episodes to find out whether she has orthostatic hypotension or not.  I discussed doing an ambulatory EEG to capture these episodes for characterization.    [place] regulations on " seizures and driving were reviewed with the patient.  The patient clearly understands that she/he is prohibited from operating a motor vehicle within 3 months following any seizure (that will impair control of car) or other episode with sudden unconsciousness or inability to sit up, and that she/he is required to report this most recent seizure to the DMV within 30 days after the event.    Avoid any activities that might lead to self-injury or injury of others, within 3 months following any seizure with impaired awareness or impaired motor control such activities include but are not limited to operating power tools, operating firearms, climbing ladders/trees/exposure to heights from which he might fall, exposure to vessels with hot cooking oil or water, and swimming alone.    - Monitor BP/pulse during episodes.    - EEG ambulatory x 2days    - Follow-up after EEG      As described above, I met with the patient for 40 minutes and during this time counseling was greater than 50% of the visit time.  Isabelle Villanueva MD

## 2021-02-12 NOTE — NURSING NOTE
"Katy Islas's goals for this visit include: return  She requests these members of her care team be copied on today's visit information:     PCP: Aura Rosario    Referring Provider:  No referring provider defined for this encounter.    /76   Pulse 82   Ht 1.575 m (5' 2\")   Wt 86.2 kg (190 lb)   SpO2 95%   BMI 34.75 kg/m      Do you need any medication refills at today's visit? n  "

## 2021-02-12 NOTE — PROGRESS NOTES
"  [facility]/[place] [facility] History and Physical         Patient:Katy Islas  : 1990  Age: 30 year old  Today's Office Visit: 2021    History of present illness:     Ms. Katy Islas is participating in this visit accompanied by ISL .  I saw her once in 2017 and she mainly wanted her DMV form to be signed in that visit.  She had a evaluation at [facility] when she was younger and had episodes without EEG correlation and was told that she had psychogenic nonepileptic spells.    Today she tells me she passed out and fell and hit her head on 2021. She lives alone.  She has been having headaches since then. Her friends have noted she has been dozing off.  She doesn't remember what transpires during those times.  She doesn't remember if she had dizziness, palpitation chest pain or any other symptoms prior to fall.  She has noted she is tired all the time. CT head was negative on 2021.  She was found to have pneumonia when she went to the hospital.     She complains of imbalance when she walks. Her friend tell her that she keeps falling, but she doesn't remember.  She can't tell how often it happens. It's much better than it was before.  She sometimes bites her tongue, no other injuries or UI.    Seizure history: based on note on 17: \"She doesn't recall her early seizures.  Her mom states she started having seizures when she was 11 or 12 year of age.  During her seizures, she was staring off.  She would kick her legs and bite her clothes.  Mom denies shaking or stiffening.  She was seen at [facility] and was started on Depakote.  Despite increasing the dose, there was no improvement in her seizures, therefore she was admitted to [facility] in [place] and had prolonged video EEG monitoring.  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  She " "was put on antidepressants and was followed by a psychologist and spells improved.  3 years ago, she had a few convulsions.  At that time she was started on Tramadol for back pain.  With one of the convulsions, she had respiratory depression and aspiration pneumonia, and got intubated and was in the ICU for several days.    She had several other seizures while she was taking Tramadol.  She was not aware that Tramadol would increase her seizures.  When Tramadol stopped, she did not have any more seizures.  Last seizure was March 2015.\"    She is taking  mg tid for \"nerve pain\".    Past Medical History:  Asthma, fibromyalgia, crohn's disease.     Family History:  nonrelevant    Social History: she went to college for 4 years.  She denies drinking. She smokes 1/2 PPD, denies illicit drugs. She is single, has no children.     Current Outpatient Medications   Medication Sig Dispense Refill     albuterol (PROAIR HFA/PROVENTIL HFA/VENTOLIN HFA) 108 (90 Base) MCG/ACT inhaler Inhale 2 puffs into the lungs every 6 hours as needed for shortness of breath / dyspnea or wheezing 3 Inhaler 3     albuterol (PROVENTIL) (2.5 MG/3ML) 0.083% neb solution Take 1 vial (2.5 mg) by nebulization every 6 hours as needed for shortness of breath / dyspnea or wheezing 50 vial 11     alum & mag hydroxide-simethicone (MAALOX ADVANCED MAX ST) 400-400-40 MG/5ML SUSP suspension Take 15 mLs by mouth every 4 hours as needed for indigestion (mix with lidocaine) 355 mL 11     ARIPiprazole (ABILIFY) 30 MG tablet Take 1 tablet (30 mg) by mouth At Bedtime 30 tablet 1     aspirin (ASA) 81 MG tablet Take 1 tablet (81 mg) by mouth daily 90 tablet 3     azelastine (OPTIVAR) 0.05 % ophthalmic solution Place 1 drop into both eyes 2 times daily 6 mL 11     buprenorphine (SUBUTEX) 2 MG SUBL sublingual tablet Place 2 mg under the tongue 3 times daily        buPROPion (WELLBUTRIN XL) 300 MG 24 hr tablet Take 1 tablet (300 mg) by mouth every morning 90 tablet " 1     busPIRone (BUSPAR) 15 MG tablet Take 1 tablet (15 mg) by mouth 2 times daily 60 tablet 5     clonazePAM (KLONOPIN) 1 MG tablet Take 1 tablet (1 mg) by mouth 2 times daily as needed for anxiety 60 tablet 0     cyclobenzaprine (FLEXERIL) 10 MG tablet Take 1 tablet (10 mg) by mouth 3 times daily as needed for muscle spasms or other (back pain) 90 tablet 3     dicyclomine (BENTYL) 20 MG tablet Take 1 tablet (20 mg) by mouth every 6 hours 90 tablet 3     diphenhydrAMINE (BENADRYL) 50 MG capsule Take 1-2 capsules ( mg) by mouth every 6 hours as needed for itching, allergies or sleep 90 capsule 3     DULoxetine (CYMBALTA) 60 MG EC capsule Take 1 capsule (60 mg) by mouth daily (Patient taking differently: Take 60 mg by mouth At Bedtime ) 90 capsule 3     EPINEPHrine (ANY BX GENERIC EQUIV) 0.3 MG/0.3ML injection 2-pack Inject 0.3 mLs (0.3 mg) into the muscle once as needed for anaphylaxis 0.6 mL 3     furosemide (LASIX) 20 MG tablet Take 1 tablet (20 mg) by mouth daily as needed (edema) 30 tablet 4     gabapentin (NEURONTIN) 300 MG capsule Take 1 capsule (300 mg) by mouth 3 times daily (Patient taking differently: Take 300 mg by mouth 4 times daily ) 270 capsule 3     hydrochlorothiazide (HYDRODIURIL) 12.5 MG tablet Take 1 tablet (12.5 mg) by mouth daily 30 tablet 1     medical cannabis (Patient's own supply) (The purpose of this order is to document that the patient reports taking medical cannabis.  This is not a prescription, and is not used to certify that the patient has a qualifying medical condition.) 0 Information only 0     medroxyPROGESTERone (DEPO-PROVERA) 150 MG/ML IM injection Inject 1 mL (150 mg) into the muscle every 3 months 3 mL 3     methocarbamol (ROBAXIN) 750 MG tablet TAKE ONE TABLET BY MOUTH THREE TIMES A DAY AS NEEDED (Patient taking differently: Take 750 mg by mouth 3 times daily as needed for muscle spasms ) 60 tablet 5     Multiple Vitamins-Minerals (EQ MULTIVITAMINS ADULT GUMMY) CHEW  Take 1 chew tab by mouth daily       naproxen (NAPROSYN) 500 MG tablet Take 1 tablet (500 mg) by mouth 2 times daily (with meals) 14 tablet 0     NONFORMULARY Apply 30 mLs topically 4 times daily       ondansetron (ZOFRAN-ODT) 8 MG ODT tab Take 1 tablet (8 mg) by mouth every 8 hours as needed for nausea 30 tablet 4     pantoprazole (PROTONIX) 40 MG EC tablet Take 1 tablet (40 mg) by mouth 2 times daily Take 30-60 minutes before a meal. 180 tablet 3     valACYclovir (VALTREX) 1000 mg tablet 2 tabs at onset of mouth sores.  Repeat dose in 12 hours.       carisoprodol (SOMA) 350 MG tablet Take 1 tablet (350 mg) by mouth 3 times daily as needed for muscle spasms (Patient not taking: Reported on 1/5/2021) 10 tablet 0     diclofenac (VOLTAREN) 1 % topical gel Place 2 g onto the skin 4 times daily as needed for moderate pain Stop if any gi upset or symptoms (Patient not taking: Reported on 1/5/2021) 100 g 0     fexofenadine (ALLEGRA) 180 MG tablet Take 1 tablet (180 mg) by mouth daily (Patient not taking: Reported on 1/5/2021) 90 tablet 3     hyoscyamine (LEVSIN) 0.125 MG tablet        lamoTRIgine (LAMICTAL) 25 MG tablet        lidocaine (XYLOCAINE) 2 % solution Swish and swallow 15 mLs in mouth every 4 hours as needed for other (GERD) ; Max 8 doses/24 hour period. Mix with 15 mLs Maalox or Mylanta. (Patient not taking: Reported on 2/12/2021) 100 mL 3     oxyCODONE-acetaminophen (PERCOCET) 7.5-325 MG per tablet Take 1 tablet by mouth nightly as needed for severe pain (Patient not taking: Reported on 2/12/2021) 8 tablet 0     prochlorperazine (COMPAZINE) 10 MG tablet Take 1 tablet (10 mg) by mouth every 6 hours as needed for nausea or vomiting 60 tablet 11     rizatriptan (MAXALT-MLT) 5 MG ODT Take 1-2 tablets (5-10 mg) by mouth at onset of headache for migraine May repeat in 2 hours. Max 6 tablets/24 hours. (Patient not taking: Reported on 2/12/2021) 18 tablet 3     valACYclovir (VALTREX) 1000 mg tablet Take 2 tablets  "(2,000 mg) by mouth 2 times daily for 1 day 4 tablet 3     Review of Systems:    She sometimes has hard time sleeping, has imbalance, she is very tired during the day. Denies swallowing difficulty, sometimes feels shortness of breath.     Exam:    /76   Pulse 82   Ht 1.575 m (5' 2\")   Wt 86.2 kg (190 lb)   SpO2 95%   BMI 34.75 kg/m       Wt Readings from Last 5 Encounters:   02/12/21 86.2 kg (190 lb)   01/19/21 87.1 kg (192 lb)   01/15/21 91.6 kg (202 lb)   01/14/21 90.3 kg (199 lb)   01/05/21 90.5 kg (199 lb 9.6 oz)     General Appearance: Alert, awake, no apparent distress  Neurological exam   Mental status: alert and oriented  Language/speech:  Communicates in sign language, follows commands  Cranial nerves: Pupils are round and reactive to light extraocular movements are intact, no nystagmus, facial sensation is intact to light touch, face symmetric, hearing loss bilaterally, Shrug shoulder is normal, tongue is midline.  Motor exam: Normal tone, bulk and strength 5 out of 5 bilaterally, no pronator drift  Sensation: Intact to light touch.   Coordination: Normal finger-to-nose   DTRs: symmetric, toes are downgoing  Gait and tandem gait: Steady    Assessment/Plan:    30-year-old right-handed woman with history of psychogenic nonepileptic spells, recent spells of fall and spacing out.  Differential diagnoses for recent spells include psychogenic nonepileptic spells, syncope and focal impaired aware seizures.  I suggested to check her blood pressure during these episodes to find out whether she has orthostatic hypotension or not.  I discussed doing an ambulatory EEG to capture these episodes for characterization.    [place] regulations on seizures and driving were reviewed with the patient.  The patient clearly understands that she/he is prohibited from operating a motor vehicle within 3 months following any seizure (that will impair control of car) or other episode with sudden unconsciousness or " inability to sit up, and that she/he is required to report this most recent seizure to the DMV within 30 days after the event.    Avoid any activities that might lead to self-injury or injury of others, within 3 months following any seizure with impaired awareness or impaired motor control such activities include but are not limited to operating power tools, operating firearms, climbing ladders/trees/exposure to heights from which he might fall, exposure to vessels with hot cooking oil or water, and swimming alone.    - Monitor BP/pulse during episodes.    - EEG ambulatory x 2days    - Follow-up after EEG      As described above, I met with the patient for 40 minutes and during this time counseling was greater than 50% of the visit time.  Isabelle Villanueva MD

## 2021-02-22 ENCOUNTER — APPOINTMENT (OUTPATIENT)
Dept: URGENT CARE | Facility: CLINIC | Age: 31
End: 2021-02-22
Payer: COMMERCIAL

## 2021-02-22 ENCOUNTER — TRANSFERRED RECORDS (OUTPATIENT)
Dept: HEALTH INFORMATION MANAGEMENT | Facility: CLINIC | Age: 31
End: 2021-02-22

## 2021-02-23 ENCOUNTER — PATIENT OUTREACH (OUTPATIENT)
Dept: CARE COORDINATION | Facility: CLINIC | Age: 31
End: 2021-02-23

## 2021-02-23 DIAGNOSIS — R30.0 DYSURIA: Primary | ICD-10-CM

## 2021-02-23 NOTE — LETTER
[facility] CARE COORDINATION  [address]  [place] [address]    February 24, 2021    Katy Islas  [address]  [place] [address]      Dear Katy,    I am a clinic community health worker who works with Aura Rosario PA-C at [facility]. I have been trying to reach you recently to introduce Clinic Care Coordination and to see if there was anything I could assist you with.  Below is a description of clinic care coordination and how I can further assist you.      The clinic care coordination team is made up of a registered nurse,  and community health worker who understand the health care system. The goal of clinic care coordination is to help you manage your health and improve access to the health care system in the most efficient manner. The team can assist you in meeting your health care goals by providing education, coordinating services, strengthening the communication among your providers and supporting you with any resource needs.    Please feel free to contact me at [phone] with any questions or concerns. We are focused on providing you with the highest-quality healthcare experience possible and that all starts with you.     Sincerely,       LYLE Boston  Clinic Care Coordination  [facility]

## 2021-02-23 NOTE — PROGRESS NOTES
Clinic Care Coordination Contact  [facility]/Voicemail       Clinical Data: Care Coordinator Outreach  Outreach attempted x 1.  Left message on patient's voicemail with call back information and requested return call.  Plan: Care Coordinator will try to reach patient again in 1-2 business days.

## 2021-02-24 ENCOUNTER — MYC REFILL (OUTPATIENT)
Dept: FAMILY MEDICINE | Facility: OTHER | Age: 31
End: 2021-02-24

## 2021-02-24 DIAGNOSIS — F32.0 MILD MAJOR DEPRESSION (H): ICD-10-CM

## 2021-02-24 DIAGNOSIS — F41.9 ANXIETY: ICD-10-CM

## 2021-02-24 RX ORDER — CLONAZEPAM 1 MG/1
1 TABLET ORAL 2 TIMES DAILY PRN
Qty: 60 TABLET | Refills: 0 | Status: SHIPPED | OUTPATIENT
Start: 2021-03-02 | End: 2021-03-23

## 2021-02-24 NOTE — PROGRESS NOTES
Clinic Care Coordination Contact  [facility]/Voicemail       Clinical Data: Care Coordinator Outreach  Outreach attempted x 2.  Left message on patient's voicemail with call back information and requested return call.  Plan: Care Coordinator will send unable to contact letter with care coordinator contact information via [employer]. Care Coordinator will do no further outreaches at this time.

## 2021-02-24 NOTE — TELEPHONE ENCOUNTER
Pending Prescriptions:                       Disp   Refills    clonazePAM (KLONOPIN) 1 MG tablet          60 tab*0        Sig: Take 1 tablet (1 mg) by mouth 2 times daily as needed           for anxiety    Routing refill request to provider for review/approval because:  Drug not on the [facility] refill protocol

## 2021-02-25 NOTE — TELEPHONE ENCOUNTER
reviewed. Last fill 2/1/21.     Not able to fill until 3/2/21. Rx sent with that date.     Zach Rosario PA-C  [facility]

## 2021-02-26 DIAGNOSIS — I10 HYPERTENSION GOAL BP (BLOOD PRESSURE) < 130/80: ICD-10-CM

## 2021-02-26 RX ORDER — HYDROCHLOROTHIAZIDE 12.5 MG/1
TABLET ORAL
Qty: 30 TABLET | Refills: 5 | Status: SHIPPED | OUTPATIENT
Start: 2021-02-26 | End: 2021-03-22

## 2021-03-01 ENCOUNTER — NURSE TRIAGE (OUTPATIENT)
Dept: NURSING | Facility: CLINIC | Age: 31
End: 2021-03-01

## 2021-03-01 NOTE — TELEPHONE ENCOUNTER
" and patient calling.  States has been vomiting and coughing since yesterday; vomit three times today.  Oral temperature is 99.9, blood pressure 96/67 and heart rate 147; \"just feel weird/not right\".  Patient is weak and clammy.  When FNA advised calling 911, patient wanted to recheck blood pressure which was the same.  Again advised to call 911; patient agreed.    Reason for Disposition    Shock suspected (e.g., cold/pale/clammy skin, too weak to stand, low BP, rapid pulse)    Protocols used: VOMITING-A-OH      "

## 2021-03-08 ENCOUNTER — VIRTUAL VISIT (OUTPATIENT)
Dept: FAMILY MEDICINE | Facility: OTHER | Age: 31
End: 2021-03-08
Payer: MEDICARE

## 2021-03-08 DIAGNOSIS — R05.9 COUGH: ICD-10-CM

## 2021-03-08 DIAGNOSIS — R11.2 NAUSEA AND VOMITING, INTRACTABILITY OF VOMITING NOT SPECIFIED, UNSPECIFIED VOMITING TYPE: ICD-10-CM

## 2021-03-08 DIAGNOSIS — J45.31 MILD PERSISTENT ASTHMA WITH EXACERBATION: Primary | ICD-10-CM

## 2021-03-08 PROCEDURE — 99443 PR PHYSICIAN TELEPHONE EVALUATION 21-30 MIN: CPT | Mod: 95 | Performed by: PHYSICIAN ASSISTANT

## 2021-03-08 RX ORDER — AZITHROMYCIN 250 MG/1
TABLET, FILM COATED ORAL
Qty: 6 TABLET | Refills: 0 | Status: SHIPPED | OUTPATIENT
Start: 2021-03-08 | End: 2021-04-02

## 2021-03-08 RX ORDER — PREDNISONE 20 MG/1
40 TABLET ORAL DAILY
Qty: 10 TABLET | Refills: 0 | Status: SHIPPED | OUTPATIENT
Start: 2021-03-08 | End: 2021-03-13

## 2021-03-08 RX ORDER — CODEINE PHOSPHATE AND GUAIFENESIN 10; 100 MG/5ML; MG/5ML
1-2 SOLUTION ORAL
Qty: 50 ML | Refills: 0 | Status: SHIPPED | OUTPATIENT
Start: 2021-03-08 | End: 2021-05-05

## 2021-03-08 NOTE — PROGRESS NOTES
"Katy is a 30 year old who is being evaluated via a billable telephone visit.      What phone number would you like to be contacted at?    How would you like to obtain your AVS? MyChart    Assessment & Plan     Mild persistent asthma with exacerbation  Symptoms sound consistent with acute illness likely viral but possibly bacterial, only a few days worth of symptoms.  She is open to COVID testing as she has technically never tested positive though imaging had suspicion for COVID earlier this year.  She sounds as though her asthma is causing her to have worse symptoms.  Will start her on Prednisone and Azithromycin to help with acute exacerbation.     She is requesting cough suppressants, ultimately requesting Cheratussin, she notes Tessalon doesn't work for her and Delsym causes her to be \"shaky\".  Discussed with PCP who is ok with short duration and small amount of Cheratussin to help acutely while other medications are beneficial.      Continue with albuterol every 4-6 hours.     - azithromycin (ZITHROMAX) 250 MG tablet; Two tablets first day, then one tablet daily for four days.  - predniSONE (DELTASONE) 20 MG tablet; Take 2 tablets (40 mg) by mouth daily for 5 days  - Cheratussin 50 mL    Cough    Nausea and vomiting, intractability of vomiting not specified, unspecified vomiting type  - Is using Zofran which is helpful.  No vomiting since 7:30 AM.   - Symptomatic COVID-19 Virus (Coronavirus) by PCR; Future    Return in about 2 weeks (around 3/22/2021) for Med Check.    Options for treatment and follow-up care were reviewed with the patient and/or guardian. Patient and/or guardian engaged in the decision making process and verbalized understanding of the options discussed and agreed with the final plan.    Wilson Ibarra PA-C  [facility]    Alex Burns is a 30 year old who presents for the following health issues     HPI       Acute Illness  Acute illness concerns: "   Onset/Duration: few days  Symptoms:  Fever: YES-   Chills/Sweats: no  Headache (location?): YES  Sinus Pressure: YES  Conjunctivitis:  no  Ear Pain: YES: left  Rhinorrhea: YES  Congestion: no  Sore Throat: no  Cough: YES, she can feel crackling in the chest, she'll get some phlegm out.   Wheeze: no  Decreased Appetite: YES  Nausea: YES  Vomiting: YES  Diarrhea: YES  Dysuria/Freq.: no  Dysuria or Hematuria: no  Fatigue/Achiness: YES  Sick/Strep Exposure: no  Therapies tried and outcome: None    - Started coughing a few days ago.  And then today the cough is getting worse.  Chest feels like there is a burning sensation.   - She has had a headache for a few days.   - Vomiting as well. Having frequent trips to bathroom as well.   - She notes that she has issues with her Crohns during this season. When winter is starting or Spring is starting she'll get Crohn's Flare.  She declines any plans on how to treat her Crohn's flares.  She does have to call to set up GI follow-up still.   - She is wheezing and burning in chest.  Her O2 has been 95% at home.  She just can't take a full breath or she will start coughing and wheezing.  Has been using albuterol.  Not working.  Tried nebulizer as well but not working as well. Using it every 2 hours.   - Temp highest 99.5 F  - vomiting started last night.  Not more than 10 times.   - Has not been taking her Compazine for nausea/vomiting, she doesn't have any left. Currently using Zofran and it works well. Using it every 8 hours.  Last time she vomited was this morning 7:30 AM.  No blood in the vomit. Her stomach has been sore for a few days.  But nothing surprising to her with her crohns.   - She has been having soft stools and her rectal region burns, has had some blood in the stool, pretty normal for her though.  No dysuria, hematuria, urinary frequency.    - She has pretty large hemorrhoids, it feels like it is infected per her.  She notes she isn't sure what to do.  She has a  sitz bath that she can use at home, She has been using it 3 times per day.  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       Review of Systems   Constitutional, HEENT, cardiovascular, pulmonary, gi and gu systems are negative, except as otherwise noted.      Objective           Vitals:  No vitals were obtained today due to virtual visit.    Physical Exam   Remainder of exam unable to be completed due to telephone visits          Phone call duration: 24:33 minutes

## 2021-03-10 ENCOUNTER — PATIENT OUTREACH (OUTPATIENT)
Dept: CARE COORDINATION | Facility: CLINIC | Age: 31
End: 2021-03-10

## 2021-03-10 DIAGNOSIS — Z71.89 OTHER SPECIFIED COUNSELING: Primary | Chronic | ICD-10-CM

## 2021-03-10 NOTE — PROGRESS NOTES
Clinic Care Coordination Contact  Community Health Worker Initial Outreach       CHW will ask the clinical team to review the referral for CCC with due to several unsuccessful CCC outreaches. Requesting RN CC to review chart and determine if outreach if appropriate.

## 2021-03-15 ENCOUNTER — ANCILLARY PROCEDURE (OUTPATIENT)
Dept: NEUROLOGY | Facility: CLINIC | Age: 31
End: 2021-03-15
Attending: PSYCHIATRY & NEUROLOGY
Payer: COMMERCIAL

## 2021-03-15 ENCOUNTER — MYC MEDICAL ADVICE (OUTPATIENT)
Dept: FAMILY MEDICINE | Facility: OTHER | Age: 31
End: 2021-03-15

## 2021-03-15 DIAGNOSIS — R55 TRANSIENT LOSS OF CONSCIOUSNESS: ICD-10-CM

## 2021-03-15 NOTE — TELEPHONE ENCOUNTER
Pt called to check on the status of her message. She is not in so much pain that she needs urgent care but is concerned about her ear as she does not use earrings and doesn't understand how she could get an infection.

## 2021-03-16 ENCOUNTER — NURSE TRIAGE (OUTPATIENT)
Dept: NURSING | Facility: CLINIC | Age: 31
End: 2021-03-16

## 2021-03-16 ENCOUNTER — ANCILLARY PROCEDURE (OUTPATIENT)
Dept: NEUROLOGY | Facility: CLINIC | Age: 31
End: 2021-03-16
Attending: PSYCHIATRY & NEUROLOGY
Payer: COMMERCIAL

## 2021-03-16 ENCOUNTER — TRANSFERRED RECORDS (OUTPATIENT)
Dept: HEALTH INFORMATION MANAGEMENT | Facility: CLINIC | Age: 31
End: 2021-03-16

## 2021-03-16 NOTE — TELEPHONE ENCOUNTER
This nurse called to discuss the message below with the patient. Patient agreed with plan and will follow up with us if we need anything.     Jose Finney RN, BSN  [place]  March 16, 2021

## 2021-03-16 NOTE — TELEPHONE ENCOUNTER
Recommend warm compresses 15-20 min 3-4x/day. Should put topical antibiotic cream 2-3x/daily.     Visit if doesn't improve by end of week    Zach Rosario PA-C  [facility]

## 2021-03-16 NOTE — TELEPHONE ENCOUNTER
All messages from this patient should go to myself and Jose.     Will route for Kirsi to call patient when she is in today.    Zach Rosario PA-C  [facility]

## 2021-03-16 NOTE — TELEPHONE ENCOUNTER
Called to discuss further with the patient.     Patient symptoms started 2 days ago. She describes her right ear being red and warm to the touch. She rates her pain 3-4/10. There was a yellowish discharge yesterday, but today the drainage is dried up. Patient denies having fevers. Patient has used tea tree oil and Zofran. She stated that she vomited twice.     PLAN:   Zach please advise if you would like to have a phone visit with the patient or based off picture and triage note treat.   Patient uses [facility] in [place].       Jose Finney RN, BSN  [place]  March 16, 2021

## 2021-03-16 NOTE — TELEPHONE ENCOUNTER
Zach please review the patient's message and advise. Then route the message to the ERC Triage pool.     Jose Finney RN, BSN  [place]  March 16, 2021

## 2021-03-16 NOTE — TELEPHONE ENCOUNTER
This nurse discussed further with the patient. Patient stated she was already doing this and her symptoms have improved. She will continue to do the compresses and call us back if her symptoms don't improve.     Patient also informed nurse that she has an EEG on that comes off tomorrow. She has a follow up with neurology on April 2nd.     Jose Finney RN, BSN  [place]  March 16, 2021

## 2021-03-16 NOTE — TELEPHONE ENCOUNTER
Klonopin is dosed only twice a day if she wants further changes or increases I had instructed her that a recommendation for that needs to come from a psychiatrist.      I had placed referrals in December to [facility], the , and [facility] (though I think she won't go back to [facility]).   Colorectal surgery referral was placed 4/2020, so she should be able to set that up herself.    Zach Rosario PA-C  [facility]

## 2021-03-17 ENCOUNTER — ANCILLARY PROCEDURE (OUTPATIENT)
Dept: NEUROLOGY | Facility: CLINIC | Age: 31
End: 2021-03-17
Attending: PSYCHIATRY & NEUROLOGY
Payer: COMMERCIAL

## 2021-03-17 ENCOUNTER — MYC MEDICAL ADVICE (OUTPATIENT)
Dept: FAMILY MEDICINE | Facility: OTHER | Age: 31
End: 2021-03-17

## 2021-03-17 NOTE — TELEPHONE ENCOUNTER
This nurse called to discuss with the patient.     Per patient she is getting her EEG off today at 11:30.     Patient describes swelling, numbness, and tingling in her finger and toes. She has not had an episode since last fall. Then her skin color changed to red. She stated it was painful to walk on her feet last night. Patient's mother has Raynaud's Disease.     PLAN:   Patient was scheduled with Zach for next week.     Next 5 appointments (look out 90 days)    Mar 22, 2021  1:00 PM  Office Visit with Aura Rosario PA-C, Assignments Open, ASL IS  [facility] ([facility] ) [address]  [facility] [address]  [phone]   Mar 24, 2021  1:45 PM  Return Visit with Nancy Younger PA-C, ASL IS  [facility] ([facility] [address] [place] [id number]  [phone]   Apr 02, 2021 10:00 AM  Return Visit with Isabelle Villanueva MD  [facility] Neurology [facility] ([facility] [address] [place] [id number]  [phone]        Jose Finney RN, BSN  [place]  March 17, 2021

## 2021-03-17 NOTE — TELEPHONE ENCOUNTER
Patient's mother is returning a call. States she was speaking with RNTelma earlier and a MD was supposed to be paged and caller hasn't heard back. Per Telma's note, MD advised patient to be seen in the ER for evaluation of possible DVT. Caller verbalized understanding and had no further questions.     Darline Yang RN/[facility] Nurse Advisors      Additional Information    Negative: [1] Caller is not with the adult (patient) AND [2] reporting urgent symptoms    Negative: Lab result questions    Negative: Medication questions    Negative: Caller can't be reached by phone    Negative: Caller has already spoken to PCP or another triager    Negative: RN needs further essential information from caller in order to complete triage    Negative: Requesting regular office appointment    Negative: [1] Caller requesting NON-URGENT health information AND [2] PCP's office is the best resource    Negative: Health Information question, no triage required and triager able to answer question    Negative: General information question, no triage required and triager able to answer question    Negative: Question about upcoming scheduled test, no triage required and triager able to answer question    Negative: [1] Caller is not with the adult (patient) AND [2] probable NON-URGENT symptoms    [1] Follow-up call to recent contact AND [2] information only call, no triage required    Protocols used: INFORMATION ONLY CALL-A-

## 2021-03-17 NOTE — ED NOTES
Able to wake pt at this time.  Through written communications discussed discharge with pt.  Pt had not additional questions.  Informed pt that due to the medications that were given pt was not to drive home.  Pt texted her neighbor to come pick her up.  Pt waiting in the lobby at this time for her ride.    PHYSICAL THERAPY - DAILY TREATMENT NOTE    Patient Name: Elif Hope        Date: 3/17/2021  : 1948   yes Patient  Verified  Visit #:      [id number]  Insurance: Payor: [employer] Members / Plan: [place] MEDICARE PART A & B / Product Type: Medicare /      In time: 10:00 Out time: 10:45   Total Treatment Time: 45     Medicare/BCBS Time Tracking (below)   Total Timed Codes (min):  45 1:1 Treatment Time:  45     TREATMENT AREA =  Low back pain [M54.5]  SUBJECTIVE  Pain Level (on 0 to 10 scale):  0  / 10   Medication Changes/New allergies or changes in medical history, any new surgeries or procedures?    no  If yes, update Summary List   Subjective Functional Status/Changes:  []  No changes reported     Its slow but getting better          OBJECTIVE  25 min Therapeutic Exercise:  [x]??????  See flow sheet   Rationale:      increase ROM, increase strength and improve coordination to improve the patients ability to perform ADLs      20 min Manual Therapy: T/S mob, B IS ant inn shivani, DTM para,R TC mob, cont/relax Hip flx and abd   Rationale:      decrease pain, increase ROM and increase tissue extensibility to improve patient's ability to perform ADLs     The manual therapy interventions were performed at a separate and distinct time from the therapeutic activities interventions.         Billed With/As:   [] TE   [] TA   [] Neuro   [] Self Care Patient Education: [x] Review HEP    [] Progressed/Changed HEP based on:   [] positioning   [] body mechanics   [] transfers   [] heat/ice application    [] other:      Other Objective/Functional Measures:    Sig improved hip flx ROM noted B      Post Treatment Pain Level (on 0 to 10) scale:   0  / 10     ASSESSMENT  Assessment/Changes in Function:     Able to sit back in golf stance without difficulty today     []  See Progress Note/Recertification   Patient will continue to benefit from skilled PT services to modify and progress therapeutic interventions, address functional mobility deficits and address ROM deficits to attain remaining goals.    Progress toward goals / Updated goals:    Progressing well with function      PLAN  [x]  Upgrade activities as tolerated yes Continue plan of care   []  Discharge due to :    []  Other:      Therapist: [place] Doe, PT    Date: 3/17/2021 Time: 6:50 AM     Future Appointments   Date Time Provider Sandro Tala   3/17/2021 10:00 AM Corinda Repress, PT [id number]   3/22/2021 10:00 AM Corinda Repress, PT [address]   3/24/2021 10:00 AM Corinda Repress, PT [address]   4/2/2021 10:00 AM Corinda Repress, PT [facility]   4/6/2021  3:45 PM Corinda Repress,  [address] [facility]   4/9/2021 10:00 AM Corinda Repress, PT [facility]   4/13/2021  3:45 PM Corinda Repress,  [address] [facility]   4/16/2021 10:00 AM Corinda Repress, PT [facility]   4/20/2021  3:45 PM Corinda Repress,  [address] [facility]   4/23/2021 10:00 AM Corinda Repress,  [address] [facility]   4/27/2021  3:45 PM Corinda Repress,  [address] [facility]   4/30/2021 10:00 AM Corinda Repress, PT [id number]

## 2021-03-17 NOTE — TELEPHONE ENCOUNTER
"Pt's mother is calling. She is there with her and gives verbal permission for me to speak with her mother. She is deaf as well, but was able to verbalize to me that it was ok to speak with her mother.    Left leg swelling in her calf. Toes were blue earlier per patient. Per mom, they are not blue now.  She had pain earlier today, but no pain now.  Not hot or cold to the touch.   No redness seen in her calf.   No history of blood clots and not on blood thinners.  One leg is 17\" around and the other is 18\" around.  I advised her that I would page the on call physician and call her back.    8:16pm-Page to on call physician. Dr Nolan Chen.  8:20pm-Call back from Dr Chen. Per Dr Chen, patient needs to go to the ED for evaluation of possible DVT now.  8:23pm-Call back to the patient's mother, Aysha. No answer. I left a message for her to call me back on her machine.    Additional Information    Negative: Severe difficulty breathing (e.g., struggling for each breath, speaks in single words)    Negative: Looks like a broken bone or dislocated joint (e.g., crooked or deformed)    Negative: Sounds like a life-threatening emergency to the triager    Negative: Chest pain    Negative: Followed a leg injury    Negative: [1] Small area of swelling AND [2] followed an insect bite to the area    Negative: Swelling of one ankle joint    Negative: Swelling of knee is main symptom    Negative: Pregnant    Negative: Postpartum (from 0 to 6 weeks after delivery)    Negative: Difficulty breathing at rest    Negative: Entire foot is cool or blue in comparison to other side    Negative: [1] Can't walk or can barely walk AND [2] new onset    Negative: [1] Difficulty breathing with exertion (e.g., walking) AND [2] new onset or worsening    Negative: [1] Red area or streak AND [2] fever    Negative: [1] Swelling is painful to touch AND [2] fever    Negative: [1] Cast on leg or ankle AND [2] now increased pain    Negative: Patient " sounds very sick or weak to the triager    [1] Thigh, calf, or ankle swelling AND [2] only 1 side    Negative: SEVERE leg swelling (e.g., swelling extends above knee, entire leg is swollen, weeping fluid)    Negative: [1] Red area or streak [2] large (> 2 in. or 5 cm)    Negative: [1] Thigh or calf pain AND [2] only 1 side AND [3] present > 1 hour    Protocols used: LEG SWELLING AND EDEMA-A-    Telma Yoder RN  [facility] Nurse Advisor  3/16/2021 at 7:59 PM

## 2021-03-19 NOTE — PROGRESS NOTES
Assessment & Plan     ICD-10-CM    1. Poor circulation  R09.89 CBC with platelets     Anti Nuclear Dede IgG by IFA with Reflex     TSH with free T4 reflex     Rheumatology Referral   2. Change of skin color  R23.8 CBC with platelets     Anti Nuclear Dede IgG by IFA with Reflex     TSH with free T4 reflex   3. Crohn's disease of colon without complication (H)  K50.10 Clostridium difficile Toxin B PCR     Ova and Parasite Exam Routine     Enteric Bacteria and Virus Panel by BENJI Stool     Rheumatology Referral   4. Hypertension goal BP (blood pressure) < 130/80  I10 metoprolol succinate ER (TOPROL-XL) 50 MG 24 hr tablet   5. Mild major depression (H)  F32.0    6. Anxiety  F41.9    7. Bipolar disorder, current episode mixed, severe, without psychotic features (H)  F31.63    8. Migraine with aura and without status migrainosus, not intractable  G43.109    9. History of pseudoseizure  Z86.69      1 & 2.   - Symptoms could be consistent with Raynaud's, which is why patient is presenting   - Recommend labs to rule out reversible causes   - Will refer to rheumatology for work up, if nothing found, may need to see vascular specialist     3. Crohn's   - Having a flare, already made appointment with specialist   - Has history of C.Diff and her dog was recently sick   - Will get stool cultures, await results     4. HTN   - Elevated today and when in ED, has NOT been taking her Losartan, Metoprolol, or hydrochlorothiazide   - Recommend restart Metoprolol at 50 mg (was previously at 200 mg)     Reviewed use and side effects  - Monitor BP daily, send BP log to provider in 1 week   - Recheck 2 months     5-7. Mood   - States counseling still weekly, going very well   - Saw psychiatrist and Thigao, didn't really like that person but went well   - Discussed could find a different psychiatrist   - Encouraged her efforts in this as she reports has been taking medication consistently      8 & 9.   - Had consult with neurology and EEG,  went well   - Encouraged this as is finally making her specialist appointments and taking control of her health     Review of the result(s) of each unique test - See list        Care Everywhere yesterday - UA, BMP, CBC  Diagnosis or treatment significantly limited by social determinants of health - Tobacco use     30 minutes spent on the date of the encounter doing chart review, history and exam, documentation and further activities as noted above.     Due to language barrier, an  was present during the history-taking and subsequent discussion (and for part of the physical exam) with this patient.    The patient indicates understanding of these issues and agrees with the plan.    Return in about 2 months (around 5/22/2021) for Recheck.    Aura Tomlinson-LOVE Moss  [facility] TERRY Burns is a 31 year old who presents for the following health issues     HPI     ED/UC Followup:    Facility:  [facility] ED  Date of visit: 3/21/21  Reason for visit: Abdominal pain   Current Status: Is now having diarrhea with blood and mucus - she would like to do stool testing     - Colorectal on 3/30  - Not taking BP medication            FROM ED NOTE  Katy Islas is a 31 y.o. female who presents the emerge department for evaluation of rectal pain. The patient has been experiencing rectal pain for the past few weeks. She describes pain around her rectum. She was evaluated for similar symptoms on March 9. On evaluation, patient appears well in no acute distress. She is sitting on the bed without any obvious discomfort. She is up and ambulatory without any difficulty. Abdomen is benign and nonsurgical. Rectal exam reveals a benign appearing rectum. There is no evidence of perirectal abscess, erythema, fluctuance or warmth. Digital exam does not reveal any palpable abscess or fluctuance. The patient tolerated this well. There is no alexus blood. Screening labs were  performed as above. CBC reveals a normal white blood cell count and hemoglobin. Basic metabolic panel reveals normal electrolytes and kidney functions. Urinalysis does not reveal any signs of infection. In review of the chart, the patient does have a unique treatment plan. I personally reviewed this. The patient has been imaged on a number of occasions including her last visit here in March. I do not feel there is any clinical evidence today that warrants imaging. There is nothing to suggest acute intra-abdominal process or rectal abscess. Patient was given Tylenol with some improvement of her symptoms. I recommended she follow-up with her primary care provider or colorectal surgeon given her continued rectal discomfort. She return here to the emergency department with any worsening pain, black/bloody stools, fevers, palpable mass, or any other new or concerning symptoms.      Concern - possible Raynauds Disease   Onset: 2-3 years  Description:  Hands, legs and feet are painful swollen and discolored, tingling and numbness  Intensity: moderate  Progression of Symptoms:  worsening  Accompanying Signs & Symptoms: tingling and numbness  Previous history of similar problem: none  Precipitating factors:        Worsened by: walking   Alleviating factors:        Improved by: warm bath, sitting, resting  Therapies tried and outcome: warm bath, sitting, resting    - Swelling started last year   - Feet hurt   - Color changes - light purple, red     Worse when cold   - Some numbness and tingling   - Mom has Raynaud's   - Ups and downs   - Iron started taking about 1 month ago, more energy      Neurology recommended this     Sees them again on 4/2/21   - fingers and toes always cold even though rest of her is sweating       RN triage from 3/17/21   Patient describes swelling, numbness, and tingling in her finger and toes. She has not had an episode since last fall. Then her skin color changed to red. She stated it was painful  "to walk on her feet last night. Patient's mother has Raynaud's Disease.       Review of Systems   Constitutional, HEENT, cardiovascular, pulmonary, gi and gu systems are negative, except as otherwise noted.      Objective    BP (!) 142/90   Pulse 108   Temp 98.2  F (36.8  C) (Temporal)   Ht 1.575 m (5' 2\")   Wt 87.5 kg (193 lb)   SpO2 99%   BMI 35.30 kg/m    Body mass index is 35.3 kg/m .  Physical Exam   GENERAL APPEARANCE: healthy, alert and no distress  EYES: Eyes grossly normal to inspection, PERRLA, conjunctivae and sclerae without injection or discharge, EOM intact   RESP: Lungs clear to auscultation - no rales, rhonchi or wheezes    CV: Regular rates and rhythm, normal S1 S2, no S3 or S4, no murmur, click or rub, no peripheral edema and peripheral pulses strong and symmetric bilaterally   MS: No musculoskeletal defects are noted and gait is age appropriate without ataxia   SKIN: No suspicious lesions or rashes, hydration status appears adeuqate with normal skin turgor   PSYCH: Alert and oriented x3; speech- coherent , normal rate and volume; able to articulate logical thoughts, able to abstract reason, no tangential thoughts, no hallucinations or delusions, mentation appears normal, Mood is euthymic. Affect is appropriate for this mood state and bright. Thought content is free of suicidal ideation, hallucinations, and delusions. Dress is adequate and upkept. Eye contact is good during conversation.       Diagnostics  Reviewed in Epic  See orders pending in Epic         "

## 2021-03-22 ENCOUNTER — OFFICE VISIT (OUTPATIENT)
Dept: FAMILY MEDICINE | Facility: OTHER | Age: 31
End: 2021-03-22
Payer: MEDICARE

## 2021-03-22 ENCOUNTER — TELEPHONE (OUTPATIENT)
Dept: FAMILY MEDICINE | Facility: OTHER | Age: 31
End: 2021-03-22

## 2021-03-22 VITALS
DIASTOLIC BLOOD PRESSURE: 90 MMHG | HEIGHT: 62 IN | OXYGEN SATURATION: 99 % | BODY MASS INDEX: 35.51 KG/M2 | HEART RATE: 108 BPM | WEIGHT: 193 LBS | TEMPERATURE: 98.2 F | SYSTOLIC BLOOD PRESSURE: 142 MMHG

## 2021-03-22 DIAGNOSIS — F32.0 MILD MAJOR DEPRESSION (H): ICD-10-CM

## 2021-03-22 DIAGNOSIS — Z87.898 HISTORY OF PSEUDOSEIZURE: ICD-10-CM

## 2021-03-22 DIAGNOSIS — R23.8 CHANGE OF SKIN COLOR: ICD-10-CM

## 2021-03-22 DIAGNOSIS — K50.10 CROHN'S DISEASE OF COLON WITHOUT COMPLICATION (H): ICD-10-CM

## 2021-03-22 DIAGNOSIS — I10 HYPERTENSION GOAL BP (BLOOD PRESSURE) < 130/80: ICD-10-CM

## 2021-03-22 DIAGNOSIS — F31.63 BIPOLAR DISORDER, CURRENT EPISODE MIXED, SEVERE, WITHOUT PSYCHOTIC FEATURES (H): ICD-10-CM

## 2021-03-22 DIAGNOSIS — G43.109 MIGRAINE WITH AURA AND WITHOUT STATUS MIGRAINOSUS, NOT INTRACTABLE: ICD-10-CM

## 2021-03-22 DIAGNOSIS — F41.9 ANXIETY: ICD-10-CM

## 2021-03-22 DIAGNOSIS — R09.89 POOR CIRCULATION: Primary | ICD-10-CM

## 2021-03-22 LAB
ERYTHROCYTE [DISTWIDTH] IN BLOOD BY AUTOMATED COUNT: 12.9 % (ref 10–15)
HCT VFR BLD AUTO: 41.2 % (ref 35–47)
HGB BLD-MCNC: 14.1 G/DL (ref 11.7–15.7)
MCH RBC QN AUTO: 33.5 PG (ref 26.5–33)
MCHC RBC AUTO-ENTMCNC: 34.2 G/DL (ref 31.5–36.5)
MCV RBC AUTO: 98 FL (ref 78–100)
PLATELET # BLD AUTO: 248 10E9/L (ref 150–450)
RBC # BLD AUTO: 4.21 10E12/L (ref 3.8–5.2)
TSH SERPL DL<=0.005 MIU/L-ACNC: 1.69 MU/L (ref 0.4–4)
WBC # BLD AUTO: 3.8 10E9/L (ref 4–11)

## 2021-03-22 PROCEDURE — 86038 ANTINUCLEAR ANTIBODIES: CPT | Performed by: PHYSICIAN ASSISTANT

## 2021-03-22 PROCEDURE — 36415 COLL VENOUS BLD VENIPUNCTURE: CPT | Performed by: PHYSICIAN ASSISTANT

## 2021-03-22 PROCEDURE — 99214 OFFICE O/P EST MOD 30 MIN: CPT | Performed by: PHYSICIAN ASSISTANT

## 2021-03-22 PROCEDURE — 84443 ASSAY THYROID STIM HORMONE: CPT | Performed by: PHYSICIAN ASSISTANT

## 2021-03-22 PROCEDURE — 85027 COMPLETE CBC AUTOMATED: CPT | Performed by: PHYSICIAN ASSISTANT

## 2021-03-22 RX ORDER — METOPROLOL SUCCINATE 50 MG/1
50 TABLET, EXTENDED RELEASE ORAL DAILY
Qty: 90 TABLET | Refills: 3 | Status: SHIPPED | OUTPATIENT
Start: 2021-03-22 | End: 2021-05-10

## 2021-03-22 ASSESSMENT — MIFFLIN-ST. JEOR: SCORE: 1538.69

## 2021-03-22 NOTE — TELEPHONE ENCOUNTER
Reason for Call: Request for an order or referral:    Order or referral being requested: stool test-patient has appt today and would like stool test. She is having mucus and blood in her stools. She would like to do this before her appt at 1.     Date needed: as soon as possible    Has the patient been seen by the PCP for this problem? YES    Additional comments:     Phone number Patient can be reached at:  Home number on file [phone] (home)    Best Time:  any    Can we leave a detailed message on this number?  YES    Call taken on 3/22/2021 at 8:28 AM by Kenzie Waters

## 2021-03-22 NOTE — TELEPHONE ENCOUNTER
I don't know which stool tests she is wanting.    Zach Rosario PA-C  [facility]

## 2021-03-23 ENCOUNTER — MYC REFILL (OUTPATIENT)
Dept: FAMILY MEDICINE | Facility: OTHER | Age: 31
End: 2021-03-23

## 2021-03-23 ENCOUNTER — MYC MEDICAL ADVICE (OUTPATIENT)
Dept: FAMILY MEDICINE | Facility: OTHER | Age: 31
End: 2021-03-23

## 2021-03-23 DIAGNOSIS — F32.0 MILD MAJOR DEPRESSION (H): ICD-10-CM

## 2021-03-23 DIAGNOSIS — F41.9 ANXIETY: ICD-10-CM

## 2021-03-23 LAB — ANA SER QL IF: NEGATIVE

## 2021-03-23 RX ORDER — CLONAZEPAM 1 MG/1
1 TABLET ORAL 2 TIMES DAILY PRN
Qty: 60 TABLET | Refills: 0 | Status: SHIPPED | OUTPATIENT
Start: 2021-03-30 | End: 2021-04-19

## 2021-03-23 NOTE — TELEPHONE ENCOUNTER
reviewed. Last filled on 2/28/21.     Should have about 1 week left. Sent RX with date of 3/30/21.     Zach Rosario PA-C  [facility]

## 2021-03-26 ENCOUNTER — TELEPHONE (OUTPATIENT)
Dept: FAMILY MEDICINE | Facility: OTHER | Age: 31
End: 2021-03-26

## 2021-03-26 NOTE — TELEPHONE ENCOUNTER
Therapist Evan called in and advised she had GERMAN to talk to the Dr. and Katy would like the  to be in the loop on what she and her therapis are working on.    Please have  call therapist Evan at [phone]

## 2021-03-30 ENCOUNTER — OFFICE VISIT (OUTPATIENT)
Dept: SURGERY | Facility: CLINIC | Age: 31
End: 2021-03-30
Payer: MEDICARE

## 2021-03-30 ENCOUNTER — NURSE TRIAGE (OUTPATIENT)
Dept: NURSING | Facility: CLINIC | Age: 31
End: 2021-03-30

## 2021-03-30 VITALS
SYSTOLIC BLOOD PRESSURE: 119 MMHG | HEART RATE: 72 BPM | DIASTOLIC BLOOD PRESSURE: 67 MMHG | BODY MASS INDEX: 36.07 KG/M2 | WEIGHT: 197.2 LBS | OXYGEN SATURATION: 100 %

## 2021-03-30 DIAGNOSIS — K60.2 ANAL FISSURE: Primary | ICD-10-CM

## 2021-03-30 PROCEDURE — 99213 OFFICE O/P EST LOW 20 MIN: CPT | Performed by: NURSE PRACTITIONER

## 2021-03-30 PROCEDURE — T1013 SIGN LANG/ORAL INTERPRETER: HCPCS | Mod: U3 | Performed by: NURSE PRACTITIONER

## 2021-03-30 ASSESSMENT — PAIN SCALES - GENERAL: PAINLEVEL: SEVERE PAIN (6)

## 2021-03-30 NOTE — TELEPHONE ENCOUNTER
I will call her when I am back in office next week. If she happens to call again, let her know I have been ill and not in office much this week.     Zach Rosario PA-C  [facility]

## 2021-03-30 NOTE — ED TRIAGE NOTES
Colorectal surgery 2 days ago for fistula/abscess reports pain in abd and rectum since yesterday. Vomiting today x3 and fever as high of 101.7f took tylenol and ibuprofen at 3 PM   (2) well flexed

## 2021-03-30 NOTE — LETTER
3/30/2021       RE: Katy Islas  [address]  [place] [id number]     Dear Colleague,    Thank you for referring your patient, Katy Islas, to the [facility] COLON AND RECTAL SURGERY CLINIC [place] at [facility]. Please see a copy of my visit note below.    Colon and Rectal Surgery Follow-Up Clinic Note    RE: Katy Islas  : 1990  TESSA: 3/30/2021    Katy Islas is a very pleasant 31 year old female who presents today with an  for follow up of rectal pain.    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 Sanchez. She had been doing well but a few weeks ago she developed pain with bowel movements. This is severe with bowel movements but sore with sitting and walking also. She has had to strain some with bowel movements. No fevers or chills. Some occasional bright red blood or pink mucous.  She had a colonoscopy in January which was normal and she has been following with GI for questionable history of Crohn's disease.    Physical Examination: Exam was chaperoned by Maegan Barton MA   /67 (BP Location: Left arm, Patient Position: Sitting, Cuff Size: Adult Regular)   Pulse 72   Wt 197 lb 3.2 oz   SpO2 100%   BMI 36.07 kg/m    General: alert, oriented, in no acute distress, sitting comfortably  HEENT:mucous membranes moist  Perianal external examination:  Perianal skin: Intact with no excoriation or lichenification.  Lesions: No evidence of an external lesion, nodularity, or induration in the perianal region.  Eversion of buttocks: There was evidence of an anal fissure. Details: posterior midline anal fissure.  Skin tags or external hemorrhoids: None.    Digital rectal examination: Was deferred in order not to cause further trauma.    Anoscopy: Was deferred in order not to cause further trauma.    Assessment/Plan: 31 year old  female with anal fissure. I discussed that this is likely the source of the pain and bleeding. Discussed the importance of keeping stools soft and easy to pass while healing fissure.  Recommended starting on a daily fiber supplement and can add in a laxative in addition as needed.  Will treat with topical diltiazem with follow up in 8 weeks. Discussed that it may take several weeks for symptoms to improve. If no improvement is noted after 4 weeks, return to clinic and discuss further intervention such as Botox injections.  Advised the use of Tylenol, ibuprofen, and warm tub baths for any pain. Patient's questions were answered to her stated satisfaction and she is in agreement with this plan.      Medical history:  Past Medical History:   Diagnosis Date     Abdominal pain 10/31- 11/4/2005    [facility] admit for Crohn's     Allergic rhinitis, cause unspecified     Allergic rhinitis     Arthritis      C. difficile diarrhea     Past, no current diarrhea.     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, without mention of intractable epilepsy     pseudoseizures diagnosed after extensive neurologic eval     Migraine 07/21/12    D/C 07/22/12-[facility]     Migraine, unspecified, without mention of intractable migraine without mention of status migrainosus     Migraine     Mild intermittent asthma     mild intermittent     Mycoplasma infection in conditions classified elsewhere and of unspecified site      Other chronic pain     Back pain for 6 years     Renal  disease     Kidney stones     Unspecified hearing loss     congenital hearing loss       Surgical history:  Past Surgical History:   Procedure Laterality Date     AS REMOVAL OF COCCYX  10/18/2017     C FUSION OF SACROILIAC JOINT  10/26/2018     COLONOSCOPY  7/1/2009    [facility].     COLONOSCOPY N/A 7/17/2019    Procedure: Colonoscopy, With Polypectomy And Biopsy;  Surgeon: Edwin Conde MD;  Location: [facility] OR     COLONOSCOPY WITH CO2 INSUFFLATION N/A 7/17/2019    Procedure: COLONOSCOPY, WITH CO2 INSUFFLATION;  Surgeon: Edwin Conde MD;  Location: [facility] OR     COMBINED ESOPHAGOSCOPY, GASTROSCOPY, DUODENOSCOPY (EGD) WITH CO2 INSUFFLATION N/A 4/12/2019    Procedure: COMBINED ESOPHAGOSCOPY, GASTROSCOPY, DUODENOSCOPY (EGD) WITH CO2 INSUFFLATION;  Surgeon: Edwin Conde MD;  Location: [facility] OR     ESOPHAGOSCOPY, GASTROSCOPY, DUODENOSCOPY (EGD), COMBINED N/A 4/12/2019    Procedure: Combined Esophagoscopy, Gastroscopy, Duodenoscopy (Egd), Biopsy Single Or Multiple;  Surgeon: Edwin Conde MD;  Location: [facility] OR     FISTULOTOMY RECTUM N/A 4/30/2020    Procedure: EXAM UNDER ANESTHESIA, ANUS, parital fistulotomy;  Surgeon: Valentin Sanchez MD;  Location: [facility] OR     FUSION SPINE POSTERIOR MINIMALLY INVASIVE ONE LEVEL N/A 2/23/2017    Procedure: FUSION SPINE POSTERIOR MINIMALLY INVASIVE ONE LEVEL;  L4-5 Oblique Lateral Lumbar Interbody Fusion   Epidural steroid injection.   Transpedicular Bone marrow aspiration;  Surgeon: Jeniffer Eugene MD;  Location: [facility] OR     HC COLONOSCOPY THRU STOMA WITH BIOPSY  10/29/2003    Impression is that of normal appearing colonoscopy, without evidence of rectal bleeding.     HC COLONOSCOPY THRU STOMA, DIAGNOSTIC  10/00    normal     HC COLONOSCOPY THRU STOMA, DIAGNOSTIC  Oct 2009    Dr López- normal     HC EEG AWAKE AND SLEEP      abnormal     HC MRI BRAIN W/O CONTRAST  12/00    normal     HC REMOVAL GALLBLADDER  8/5/2009     [facility], [place].      UGI ENDOSCOPY DIAG W BIOPSY  11/11/09    Normal esophagus     ORTHOPEDIC SURGERY  October 19,2011    diskectomy L4-L5     [facility] UGI ENDOSCOPY, SIMPLE EXAM  7/00, 10/00    mild chronic esophagitis and duodenitis, neg H pylori     [facility] UGI ENDOSCOPY, SIMPLE EXAM  01/20/2005    Esophagogastroduodenoscopy, colonoscopy with biopsies.  [facility] UGI ENDOSCOPY, SIMPLE EXAM  7/1/2009    [facility], [facility].     [facility] UGI ENDOSCOPY, SIMPLE EXAM  11/11/2009    attempted upper GI, pt. could not tolerate procedure:[facility]       Problem list:  Patient Active Problem List    Diagnosis Date Noted     Lymphedema 09/10/2020     Priority: Medium     Patellofemoral pain syndrome of both knees 06/11/2020     Priority: Medium     Primary osteoarthritis of both knees 06/11/2020     Priority: Medium     Abscess of anal and rectal regions 04/27/2020     Priority: Medium     Added automatically from request for surgery [id number]       Rectal pain 04/19/2020     Priority: Medium     Abdominal pain 04/18/2020     Priority: Medium     Morbid obesity (H) 03/10/2020     Priority: Medium     Abnormal uterine bleeding 02/12/2020     Priority: Medium     Added automatically from request for surgery [id number]       Anal fissure 02/04/2020     Priority: Medium     Low hematocrit 02/04/2020     Priority: Medium     Elevated d-dimer 02/04/2020     Priority: Medium     Hypertension goal BP (blood pressure) < 130/80 08/05/2019     Priority: Medium     Bulge of cervical disc without myelopathy 04/24/2019     Priority: Medium     Cervicalgia 04/17/2019     Priority: Medium     Class 1 obesity due to excess calories without serious comorbidity with body mass index (BMI) of 32.0 to 32.9 in adult 04/03/2019     Priority: Medium     Hypophosphatemia 11/01/2018     Priority: Medium     Patellar maltracking, right 09/05/2018     Priority: Medium     Right anterior knee  pain 09/05/2018     Priority: Medium     Melanocytic nevus, unspecified location 07/23/2018     Priority: Medium     Dysplastic skin lesion 07/23/2018     Priority: Medium     Iliotibial band syndrome affecting lower leg, right 12/21/2017     Priority: Medium     Chondromalacia of right patellofemoral joint 12/21/2017     Priority: Medium     Upper GI bleed - suspected 07/01/2017     Priority: Medium     Tobacco use disorder 07/01/2017     Priority: Medium     History of pseudoseizure 07/01/2017     Priority: Medium     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) 03/10/2017     Priority: Medium     Lumbar disc disease with radiculopathy 02/23/2017     Priority: Medium     S/P lumbar fusion 02/23/2017     Priority: Medium     Dr. YOVANI Millan, 2/23/17       Vitamin D deficiency 01/06/2017     Priority: Medium     Atypical mole 01/06/2017     Priority: Medium     Mild persistent asthma without complication 12/02/2016     Priority: Medium     Chronic bilateral low back pain with left-sided sciatica 12/02/2016     Priority: Medium     Bee sting allergy 09/16/2016     Priority: Medium     C. difficile colitis 08/26/2016     Priority: Medium     Gastroesophageal reflux disease without esophagitis 08/26/2016     Priority: Medium     Herpes simplex virus infection 11/12/2015     Priority: Medium     Adjustment disorder with mixed anxiety and depressed mood 10/14/2015     Priority: Medium     Marijuana abuse 02/22/2015     Priority: Medium     Bipolar disorder (H) 01/31/2013     Priority: Medium     Problem list name updated by automated process. Provider to review       Chronic pain 02/09/2012     Priority: Medium     Patient is followed by Aura Rosario PA-C for ongoing prescription of pain medication.  All refills should only be approved by this provider, or covering partner.    Medication(s): Norco    Maximum quantity per month: 70  Clinic visit frequency required: Q 2 months     Controlled  substance agreement:   Discussed and signed 4/25/17    Encounter-Level CSA - 01/12/2015:                 Controlled Substance Agreement - Scan on 1/26/2015  9:14 AM : Controlled Medication Agreement-01/12/15 (below)            Pain Clinic evaluation in the past: Yes       Date/Location:   [facility], fall 2016    DIRE Total Score(s):    4/25/2017   Total Score 17       Last [facility] website verification:  done on 4/25/17 by LOVE Maki   [email or web address]           Anxiety 09/22/2011     Priority: Medium     Mild major depression (H) 08/29/2011     Priority: Medium     Mild intermittent asthma 10/12/2009     Priority: Medium     Overview:   Formatting of this note might be different from the original.  Action plan: See letter 4/10/2013   ATAQ:   Asthma Data 4/10/2013   Date completed 4/10/2013   Missed daily activities no = 0   Wake at night no = 0   Believe asthma in control yes = 0   ARIN use yes   Maximum ARIN use in 1 day 1-4 = 0   ATAQ score 0 = well controlled   Asthma ED visits in past 12 mos 0   Asthma hospitalizations in past 12 mos 0     Current Outpatient Prescriptions   Medication Sig     albuterol (PROVENTIL) 0.083 % neb solution Inhale 3 mL via a nebulizer every 4 hours if needed for Shortness Of Breath.        Crohn's colitis (H) 08/04/2009     Priority: Medium     Dysmenorrhea 05/11/2007     Priority: Medium     Benign neoplasm of skin of lower limb, including hip 03/16/2004     Priority: Medium     Migraine      Priority: Medium     Dr. Farrar - Neurology.  Now on Inderal.  Seems to be working.  Follow-up 9/08  Problem list name updated by automated process. Provider to review       Hearing loss      Priority: Medium     Congenital  Problem list name updated by automated process. Provider to review       Allergic rhinitis      Priority: Medium     Problem list name updated by automated process. Provider to review         Medications:  Current Outpatient Medications   Medication Sig  Dispense Refill     albuterol (PROAIR HFA/PROVENTIL HFA/VENTOLIN HFA) 108 (90 Base) MCG/ACT inhaler Inhale 2 puffs into the lungs every 6 hours as needed for shortness of breath / dyspnea or wheezing 3 Inhaler 3     albuterol (PROVENTIL) (2.5 MG/3ML) 0.083% neb solution Take 1 vial (2.5 mg) by nebulization every 6 hours as needed for shortness of breath / dyspnea or wheezing 50 vial 11     alum & mag hydroxide-simethicone (MAALOX ADVANCED MAX ST) 400-400-40 MG/5ML SUSP suspension Take 15 mLs by mouth every 4 hours as needed for indigestion (mix with lidocaine) 355 mL 11     ARIPiprazole (ABILIFY) 30 MG tablet Take 1 tablet (30 mg) by mouth At Bedtime 30 tablet 1     aspirin (ASA) 81 MG tablet Take 1 tablet (81 mg) by mouth daily 90 tablet 3     azelastine (OPTIVAR) 0.05 % ophthalmic solution Place 1 drop into both eyes 2 times daily 6 mL 11     azithromycin (ZITHROMAX) 250 MG tablet Two tablets first day, then one tablet daily for four days. 6 tablet 0     buprenorphine (SUBUTEX) 2 MG SUBL sublingual tablet Place 2 mg under the tongue 3 times daily        buPROPion (WELLBUTRIN XL) 300 MG 24 hr tablet Take 1 tablet (300 mg) by mouth every morning 90 tablet 1     busPIRone (BUSPAR) 15 MG tablet Take 1 tablet (15 mg) by mouth 2 times daily 60 tablet 5     carisoprodol (SOMA) 350 MG tablet Take 1 tablet (350 mg) by mouth 3 times daily as needed for muscle spasms 10 tablet 0     clonazePAM (KLONOPIN) 1 MG tablet Take 1 tablet (1 mg) by mouth 2 times daily as needed for anxiety 60 tablet 0     cyclobenzaprine (FLEXERIL) 10 MG tablet Take 1 tablet (10 mg) by mouth 3 times daily as needed for muscle spasms or other (back pain) 90 tablet 3     diclofenac (VOLTAREN) 1 % topical gel Place 2 g onto the skin 4 times daily as needed for moderate pain Stop if any gi upset or symptoms 100 g 0     dicyclomine (BENTYL) 20 MG tablet Take 1 tablet (20 mg) by mouth every 6 hours 90 tablet 3     diltiazem 2% in PLO gel Apply small amount  to anal opening three times daily for 8 weeks. 60 g 0     diphenhydrAMINE (BENADRYL) 50 MG capsule Take 1-2 capsules ( mg) by mouth every 6 hours as needed for itching, allergies or sleep 90 capsule 3     DULoxetine (CYMBALTA) 60 MG EC capsule Take 1 capsule (60 mg) by mouth daily (Patient taking differently: Take 60 mg by mouth At Bedtime ) 90 capsule 3     EPINEPHrine (ANY BX GENERIC EQUIV) 0.3 MG/0.3ML injection 2-pack Inject 0.3 mLs (0.3 mg) into the muscle once as needed for anaphylaxis 0.6 mL 3     fexofenadine (ALLEGRA) 180 MG tablet Take 1 tablet (180 mg) by mouth daily 90 tablet 3     furosemide (LASIX) 20 MG tablet Take 1 tablet (20 mg) by mouth daily as needed (edema) 30 tablet 4     gabapentin (NEURONTIN) 300 MG capsule Take 1 capsule (300 mg) by mouth 3 times daily (Patient taking differently: Take 300 mg by mouth 4 times daily ) 270 capsule 3     guaiFENesin-codeine (ROBITUSSIN AC) 100-10 MG/5ML solution Take 5-10 mLs by mouth nightly as needed for cough 50 mL 0     hyoscyamine (LEVSIN) 0.125 MG tablet        lamoTRIgine (LAMICTAL) 25 MG tablet        lidocaine (XYLOCAINE) 2 % solution Swish and swallow 15 mLs in mouth every 4 hours as needed for other (GERD) ; Max 8 doses/24 hour period. Mix with 15 mLs Maalox or Mylanta. 100 mL 3     medical cannabis (Patient's own supply) (The purpose of this order is to document that the patient reports taking medical cannabis.  This is not a prescription, and is not used to certify that the patient has a qualifying medical condition.) 0 Information only 0     medroxyPROGESTERone (DEPO-PROVERA) 150 MG/ML IM injection Inject 1 mL (150 mg) into the muscle every 3 months 3 mL 3     methocarbamol (ROBAXIN) 750 MG tablet TAKE ONE TABLET BY MOUTH THREE TIMES A DAY AS NEEDED (Patient taking differently: Take 750 mg by mouth 3 times daily as needed for muscle spasms ) 60 tablet 5     metoprolol succinate ER (TOPROL-XL) 50 MG 24 hr tablet Take 1 tablet (50 mg) by  mouth daily 90 tablet 3     Multiple Vitamins-Minerals (EQ MULTIVITAMINS ADULT GUMMY) CHEW Take 1 chew tab by mouth daily       naproxen (NAPROSYN) 500 MG tablet Take 1 tablet (500 mg) by mouth 2 times daily (with meals) 14 tablet 0     NONFORMULARY Apply 30 mLs topically 4 times daily       ondansetron (ZOFRAN-ODT) 8 MG ODT tab Take 1 tablet (8 mg) by mouth every 8 hours as needed for nausea 30 tablet 4     oxyCODONE-acetaminophen (PERCOCET) 7.5-325 MG per tablet Take 1 tablet by mouth nightly as needed for severe pain 8 tablet 0     pantoprazole (PROTONIX) 40 MG EC tablet Take 1 tablet (40 mg) by mouth 2 times daily Take 30-60 minutes before a meal. 180 tablet 3     prochlorperazine (COMPAZINE) 10 MG tablet Take 1 tablet (10 mg) by mouth every 6 hours as needed for nausea or vomiting 60 tablet 11     rizatriptan (MAXALT-MLT) 5 MG ODT Take 1-2 tablets (5-10 mg) by mouth at onset of headache for migraine May repeat in 2 hours. Max 6 tablets/24 hours. 18 tablet 3     valACYclovir (VALTREX) 1000 mg tablet 2 tabs at onset of mouth sores.  Repeat dose in 12 hours.       valACYclovir (VALTREX) 1000 mg tablet Take 2 tablets (2,000 mg) by mouth 2 times daily for 1 day 4 tablet 3       Allergies:  Allergies   Allergen Reactions     Iohexol Hives     Other reaction(s): Hives  IV contrast; patient states she tolerates contrast if she gets benadryl before  IV contrast; patient states she tolerates contrast if she gets benadryl before     Baclofen      hives     Bees Hives, Swelling and Difficulty breathing     Contrast Dye      Hives,   Updated 5/10/2016 CT Contrast.     Iodine Hives     Metoclopramide      Other reaction(s): Tremors  LW Reaction: shaking/sweating     Midazolam      Other reaction(s): Agitation     Monosodium Glutamate      Morphine Other (See Comments)     Difficulty with urination     Nsaids Other (See Comments)     GI BLEED x2     Other (Do Not Use) Other (See Comments)     Xanaflex- pt becomes disoriented  and loses bladder control     Reglan [Metoclopramide Hcl] Other (See Comments)     shaking     Soma [Carisoprodol] Visual Disturbance     Sleep walking     Tizanidine      Topamax Other (See Comments)     Topamax [Topiramate] Nausea     Tingling  GI/Vomit     Tramadol      Severe Headache, Seizure Risk     Tylenol W/Codeine [Acetaminophen-Codeine] Nausea and Itching     Tylenol 3     Versed Other (See Comments)     Zolmitriptan      Makes face feel like its twitching     Droperidol Anxiety     Flu Virus Vaccine Rash      Arm swelling       Family history:  Family History   Problem Relation Age of Onset     Gastrointestinal Disease Brother         severe Crohn's     Neurologic Disorder Brother         Seizures post head injury     Depression Brother      Substance Abuse Brother      Genitourinary Problems Father         kidney stones     Diabetes Father      Heart Disease Father         Open heart surgery     Breast Cancer Maternal Grandmother      Parkinsonism Maternal Grandmother      Cerebrovascular Disease Paternal Grandmother      Cancer Maternal Grandfather         Lung     Cardiovascular Paternal Grandfather         Heart Attack     Substance Abuse Mother         in recovery x1 year      Lung Cancer Paternal Uncle      Cancer Paternal Uncle      Lung Cancer Maternal Uncle      Colon Cancer Maternal Uncle        Social history:  Social History     Tobacco Use     Smoking status: Current Every Day Smoker     Packs/day: 0.25     Years: 5.00     Pack years: 1.25     Types: Cigarettes     Smokeless tobacco: Never Used   Substance Use Topics     Alcohol use: Not Currently     Comment: Not since last July 2018     Marital status: single.    Nursing Notes:   Maegan Barton  3/30/2021 11:38 AM  Signed  Chief Complaint   Patient presents with     RECHECK     follow up on rectal pain and bleeding       Vitals:    03/30/21 1136   BP: 119/67   BP Location: Left arm   Patient Position: Sitting   Cuff Size: Adult Regular    Pulse: 72   SpO2: 100%   Weight: 197 lb 3.2 oz       Body mass index is 36.07 kg/m .    Maegan Barton MA      20 minutes spent on the date of the encounter doing chart review, history and exam, documentation and further activities as noted above.     Humera Jason NP-C  Colon and Rectal Surgery  [facility]

## 2021-03-30 NOTE — PROGRESS NOTES
Colon and Rectal Surgery Follow-Up Clinic Note    RE: Katy Islas  : 1990  TESSA: 3/30/2021    Katy Islas is a very pleasant 31 year old female who presents today with an  for follow up of rectal pain.    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 Sanchez. She had been doing well but a few weeks ago she developed pain with bowel movements. This is severe with bowel movements but sore with sitting and walking also. She has had to strain some with bowel movements. No fevers or chills. Some occasional bright red blood or pink mucous.  She had a colonoscopy in January which was normal and she has been following with GI for questionable history of Crohn's disease.    Physical Examination: Exam was chaperoned by Maegan Barton MA   /67 (BP Location: Left arm, Patient Position: Sitting, Cuff Size: Adult Regular)   Pulse 72   Wt 197 lb 3.2 oz   SpO2 100%   BMI 36.07 kg/m    General: alert, oriented, in no acute distress, sitting comfortably  HEENT:mucous membranes moist  Perianal external examination:  Perianal skin: Intact with no excoriation or lichenification.  Lesions: No evidence of an external lesion, nodularity, or induration in the perianal region.  Eversion of buttocks: There was evidence of an anal fissure. Details: posterior midline anal fissure.  Skin tags or external hemorrhoids: None.    Digital rectal examination: Was deferred in order not to cause further trauma.    Anoscopy: Was deferred in order not to cause further trauma.    Assessment/Plan: 31 year old female with anal fissure. I discussed that this is likely the source of the pain and bleeding. Discussed the importance of keeping stools soft and easy to pass while healing fissure.  Recommended starting on a daily fiber supplement and can add in a laxative in addition as needed.  Will treat with topical diltiazem with follow up in 8 weeks.  Discussed that it may take several weeks for symptoms to improve. If no improvement is noted after 4 weeks, return to clinic and discuss further intervention such as Botox injections.  Advised the use of Tylenol, ibuprofen, and warm tub baths for any pain. Patient's questions were answered to her stated satisfaction and she is in agreement with this plan.      Medical history:  Past Medical History:   Diagnosis Date     Abdominal pain 10/31- 11/4/2005    [facility]     Allergic rhinitis, cause unspecified     Allergic rhinitis     Arthritis      C. difficile diarrhea     Past, no current diarrhea.     Crohn's disease (H)     sees Dr Summers or Ryan at [facility] in [place]     Crohn's disease (H)      Depression with anxiety 2003    [facility] Bernard ([facility]iatry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, without mention of intractable epilepsy     pseudoseizures diagnosed after extensive neurologic eval     Migraine 07/21/12    D/C 07/22/12-[facility]     Migraine, unspecified, without mention of intractable migraine without mention of status migrainosus     Migraine     Mild intermittent asthma     mild intermittent     Mycoplasma infection in conditions classified elsewhere and of unspecified site      Other chronic pain     Back pain for 6 years     Renal disease     Kidney stones     Unspecified hearing loss     congenital hearing loss       Surgical history:  Past Surgical History:   Procedure Laterality Date     AS REMOVAL OF COCCYX  10/18/2017     C FUSION OF SACROILIAC JOINT  10/26/2018     COLONOSCOPY  7/1/2009    [facility] [place]     COLONOSCOPY N/A 7/17/2019    Procedure:  Colonoscopy, With Polypectomy And Biopsy;  Surgeon: Edwin Conde MD;  Location: [facility] OR     COLONOSCOPY WITH CO2 INSUFFLATION N/A 7/17/2019    Procedure: COLONOSCOPY, WITH CO2 INSUFFLATION;  Surgeon: Edwin Conde MD;  Location: [facility] OR     COMBINED ESOPHAGOSCOPY, GASTROSCOPY, DUODENOSCOPY (EGD) WITH CO2 INSUFFLATION N/A 4/12/2019    Procedure: COMBINED ESOPHAGOSCOPY, GASTROSCOPY, DUODENOSCOPY (EGD) WITH CO2 INSUFFLATION;  Surgeon: Edwin Conde MD;  Location: [facility] OR     ESOPHAGOSCOPY, GASTROSCOPY, DUODENOSCOPY (EGD), COMBINED N/A 4/12/2019    Procedure: Combined Esophagoscopy, Gastroscopy, Duodenoscopy (Egd), Biopsy Single Or Multiple;  Surgeon: Edwin Conde MD;  Location: [facility] OR     FISTULOTOMY RECTUM N/A 4/30/2020    Procedure: EXAM UNDER ANESTHESIA, ANUS, parital fistulotomy;  Surgeon: Valentin Sanchez MD;  Location: [facility] OR     FUSION SPINE POSTERIOR MINIMALLY INVASIVE ONE LEVEL N/A 2/23/2017    Procedure: FUSION SPINE POSTERIOR MINIMALLY INVASIVE ONE LEVEL;  L4-5 Oblique Lateral Lumbar Interbody Fusion   Epidural steroid injection.   Transpedicular Bone marrow aspiration;  Surgeon: Jeniffer Eugene MD;  Location: [facility] OR     HC COLONOSCOPY THRU STOMA WITH BIOPSY  10/29/2003    Impression is that of normal appearing colonoscopy, without evidence of rectal bleeding.     HC COLONOSCOPY THRU STOMA, DIAGNOSTIC  10/00    normal     HC COLONOSCOPY THRU STOMA, DIAGNOSTIC  Oct 2009    Dr López- normal     HC EEG AWAKE AND SLEEP      abnormal     HC MRI BRAIN W/O CONTRAST  12/00    normal     HC REMOVAL GALLBLADDER  8/5/2009    [facility].     HC UGI ENDOSCOPY DIAG W BIOPSY  11/11/09    Normal esophagus     ORTHOPEDIC SURGERY  October 19,2011    diskectomy L4-L5     [facility] UGI ENDOSCOPY, SIMPLE EXAM  7/00, 10/00    mild chronic esophagitis and duodenitis, neg H pylori     [facility] UGI ENDOSCOPY, SIMPLE EXAM  01/20/2005    Esophagogastroduodenoscopy,  colonoscopy with biopsies.  [facility] UGI ENDOSCOPY, SIMPLE EXAM  7/1/2009    [facility], [place].     [facility] UGI ENDOSCOPY, SIMPLE EXAM  11/11/2009    attempted upper GI, pt. could not tolerate procedure:[facility]       Problem list:  Patient Active Problem List    Diagnosis Date Noted     Lymphedema 09/10/2020     Priority: Medium     Patellofemoral pain syndrome of both knees 06/11/2020     Priority: Medium     Primary osteoarthritis of both knees 06/11/2020     Priority: Medium     Abscess of anal and rectal regions 04/27/2020     Priority: Medium     Added automatically from request for surgery [id number]       Rectal pain 04/19/2020     Priority: Medium     Abdominal pain 04/18/2020     Priority: Medium     Morbid obesity (H) 03/10/2020     Priority: Medium     Abnormal uterine bleeding 02/12/2020     Priority: Medium     Added automatically from request for surgery [id number]       Anal fissure 02/04/2020     Priority: Medium     Low hematocrit 02/04/2020     Priority: Medium     Elevated d-dimer 02/04/2020     Priority: Medium     Hypertension goal BP (blood pressure) < 130/80 08/05/2019     Priority: Medium     Bulge of cervical disc without myelopathy 04/24/2019     Priority: Medium     Cervicalgia 04/17/2019     Priority: Medium     Class 1 obesity due to excess calories without serious comorbidity with body mass index (BMI) of 32.0 to 32.9 in adult 04/03/2019     Priority: Medium     Hypophosphatemia 11/01/2018     Priority: Medium     Patellar maltracking, right 09/05/2018     Priority: Medium     Right anterior knee pain 09/05/2018     Priority: Medium     Melanocytic nevus, unspecified location 07/23/2018     Priority: Medium     Dysplastic skin lesion 07/23/2018     Priority: Medium     Iliotibial band syndrome affecting lower leg, right 12/21/2017     Priority: Medium     Chondromalacia of right patellofemoral joint 12/21/2017     Priority: Medium      Upper GI bleed - suspected 07/01/2017     Priority: Medium     Tobacco use disorder 07/01/2017     Priority: Medium     History of pseudoseizure 07/01/2017     Priority: Medium     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) 03/10/2017     Priority: Medium     Lumbar disc disease with radiculopathy 02/23/2017     Priority: Medium     S/P lumbar fusion 02/23/2017     Priority: Medium     Dr. YOVANI Millan, 2/23/17       Vitamin D deficiency 01/06/2017     Priority: Medium     Atypical mole 01/06/2017     Priority: Medium     Mild persistent asthma without complication 12/02/2016     Priority: Medium     Chronic bilateral low back pain with left-sided sciatica 12/02/2016     Priority: Medium     Bee sting allergy 09/16/2016     Priority: Medium     C. difficile colitis 08/26/2016     Priority: Medium     Gastroesophageal reflux disease without esophagitis 08/26/2016     Priority: Medium     Herpes simplex virus infection 11/12/2015     Priority: Medium     Adjustment disorder with mixed anxiety and depressed mood 10/14/2015     Priority: Medium     Marijuana abuse 02/22/2015     Priority: Medium     Bipolar disorder (H) 01/31/2013     Priority: Medium     Problem list name updated by automated process. Provider to review       Chronic pain 02/09/2012     Priority: Medium     Patient is followed by Aura Rosario PA-C for ongoing prescription of pain medication.  All refills should only be approved by this provider, or covering partner.    Medication(s): Norco    Maximum quantity per month: 70  Clinic visit frequency required: Q 2 months     Controlled substance agreement:   Discussed and signed 4/25/17    Encounter-Level CSA - 01/12/2015:                 Controlled Substance Agreement - Scan on 1/26/2015  9:14 AM : Controlled Medication Agreement-01/12/15 (below)            Pain Clinic evaluation in the past: Yes       Date/Location:   [facility], fall 2016    DIRE Total Score(s):    4/25/2017   Total  Score 17       Last [facility] website verification:  done on 4/25/17 by LOVE Maki   [email or web address]           Anxiety 09/22/2011     Priority: Medium     Mild major depression (H) 08/29/2011     Priority: Medium     Mild intermittent asthma 10/12/2009     Priority: Medium     Overview:   Formatting of this note might be different from the original.  Action plan: See letter 4/10/2013   ATAQ:   Asthma Data 4/10/2013   Date completed 4/10/2013   Missed daily activities no = 0   Wake at night no = 0   Believe asthma in control yes = 0   ARIN use yes   Maximum ARIN use in 1 day 1-4 = 0   ATAQ score 0 = well controlled   Asthma ED visits in past 12 mos 0   Asthma hospitalizations in past 12 mos 0     Current Outpatient Prescriptions   Medication Sig     albuterol (PROVENTIL) 0.083 % neb solution Inhale 3 mL via a nebulizer every 4 hours if needed for Shortness Of Breath.        Crohn's colitis (H) 08/04/2009     Priority: Medium     Dysmenorrhea 05/11/2007     Priority: Medium     Benign neoplasm of skin of lower limb, including hip 03/16/2004     Priority: Medium     Migraine      Priority: Medium     Dr. Farrar - Neurology.  Now on Inderal.  Seems to be working.  Follow-up 9/08  Problem list name updated by automated process. Provider to review       Hearing loss      Priority: Medium     Congenital  Problem list name updated by automated process. Provider to review       Allergic rhinitis      Priority: Medium     Problem list name updated by automated process. Provider to review         Medications:  Current Outpatient Medications   Medication Sig Dispense Refill     albuterol (PROAIR HFA/PROVENTIL HFA/VENTOLIN HFA) 108 (90 Base) MCG/ACT inhaler Inhale 2 puffs into the lungs every 6 hours as needed for shortness of breath / dyspnea or wheezing 3 Inhaler 3     albuterol (PROVENTIL) (2.5 MG/3ML) 0.083% neb solution Take 1 vial (2.5 mg) by nebulization every 6 hours as needed for shortness of  breath / dyspnea or wheezing 50 vial 11     alum & mag hydroxide-simethicone (MAALOX ADVANCED MAX ST) 400-400-40 MG/5ML SUSP suspension Take 15 mLs by mouth every 4 hours as needed for indigestion (mix with lidocaine) 355 mL 11     ARIPiprazole (ABILIFY) 30 MG tablet Take 1 tablet (30 mg) by mouth At Bedtime 30 tablet 1     aspirin (ASA) 81 MG tablet Take 1 tablet (81 mg) by mouth daily 90 tablet 3     azelastine (OPTIVAR) 0.05 % ophthalmic solution Place 1 drop into both eyes 2 times daily 6 mL 11     azithromycin (ZITHROMAX) 250 MG tablet Two tablets first day, then one tablet daily for four days. 6 tablet 0     buprenorphine (SUBUTEX) 2 MG SUBL sublingual tablet Place 2 mg under the tongue 3 times daily        buPROPion (WELLBUTRIN XL) 300 MG 24 hr tablet Take 1 tablet (300 mg) by mouth every morning 90 tablet 1     busPIRone (BUSPAR) 15 MG tablet Take 1 tablet (15 mg) by mouth 2 times daily 60 tablet 5     carisoprodol (SOMA) 350 MG tablet Take 1 tablet (350 mg) by mouth 3 times daily as needed for muscle spasms 10 tablet 0     clonazePAM (KLONOPIN) 1 MG tablet Take 1 tablet (1 mg) by mouth 2 times daily as needed for anxiety 60 tablet 0     cyclobenzaprine (FLEXERIL) 10 MG tablet Take 1 tablet (10 mg) by mouth 3 times daily as needed for muscle spasms or other (back pain) 90 tablet 3     diclofenac (VOLTAREN) 1 % topical gel Place 2 g onto the skin 4 times daily as needed for moderate pain Stop if any gi upset or symptoms 100 g 0     dicyclomine (BENTYL) 20 MG tablet Take 1 tablet (20 mg) by mouth every 6 hours 90 tablet 3     diltiazem 2% in PLO gel Apply small amount to anal opening three times daily for 8 weeks. 60 g 0     diphenhydrAMINE (BENADRYL) 50 MG capsule Take 1-2 capsules ( mg) by mouth every 6 hours as needed for itching, allergies or sleep 90 capsule 3     DULoxetine (CYMBALTA) 60 MG EC capsule Take 1 capsule (60 mg) by mouth daily (Patient taking differently: Take 60 mg by mouth At Bedtime  ) 90 capsule 3     EPINEPHrine (ANY BX GENERIC EQUIV) 0.3 MG/0.3ML injection 2-pack Inject 0.3 mLs (0.3 mg) into the muscle once as needed for anaphylaxis 0.6 mL 3     fexofenadine (ALLEGRA) 180 MG tablet Take 1 tablet (180 mg) by mouth daily 90 tablet 3     furosemide (LASIX) 20 MG tablet Take 1 tablet (20 mg) by mouth daily as needed (edema) 30 tablet 4     gabapentin (NEURONTIN) 300 MG capsule Take 1 capsule (300 mg) by mouth 3 times daily (Patient taking differently: Take 300 mg by mouth 4 times daily ) 270 capsule 3     guaiFENesin-codeine (ROBITUSSIN AC) 100-10 MG/5ML solution Take 5-10 mLs by mouth nightly as needed for cough 50 mL 0     hyoscyamine (LEVSIN) 0.125 MG tablet        lamoTRIgine (LAMICTAL) 25 MG tablet        lidocaine (XYLOCAINE) 2 % solution Swish and swallow 15 mLs in mouth every 4 hours as needed for other (GERD) ; Max 8 doses/24 hour period. Mix with 15 mLs Maalox or Mylanta. 100 mL 3     medical cannabis (Patient's own supply) (The purpose of this order is to document that the patient reports taking medical cannabis.  This is not a prescription, and is not used to certify that the patient has a qualifying medical condition.) 0 Information only 0     medroxyPROGESTERone (DEPO-PROVERA) 150 MG/ML IM injection Inject 1 mL (150 mg) into the muscle every 3 months 3 mL 3     methocarbamol (ROBAXIN) 750 MG tablet TAKE ONE TABLET BY MOUTH THREE TIMES A DAY AS NEEDED (Patient taking differently: Take 750 mg by mouth 3 times daily as needed for muscle spasms ) 60 tablet 5     metoprolol succinate ER (TOPROL-XL) 50 MG 24 hr tablet Take 1 tablet (50 mg) by mouth daily 90 tablet 3     Multiple Vitamins-Minerals (EQ MULTIVITAMINS ADULT GUMMY) CHEW Take 1 chew tab by mouth daily       naproxen (NAPROSYN) 500 MG tablet Take 1 tablet (500 mg) by mouth 2 times daily (with meals) 14 tablet 0     NONFORMULARY Apply 30 mLs topically 4 times daily       ondansetron (ZOFRAN-ODT) 8 MG ODT tab Take 1 tablet (8 mg)  by mouth every 8 hours as needed for nausea 30 tablet 4     oxyCODONE-acetaminophen (PERCOCET) 7.5-325 MG per tablet Take 1 tablet by mouth nightly as needed for severe pain 8 tablet 0     pantoprazole (PROTONIX) 40 MG EC tablet Take 1 tablet (40 mg) by mouth 2 times daily Take 30-60 minutes before a meal. 180 tablet 3     prochlorperazine (COMPAZINE) 10 MG tablet Take 1 tablet (10 mg) by mouth every 6 hours as needed for nausea or vomiting 60 tablet 11     rizatriptan (MAXALT-MLT) 5 MG ODT Take 1-2 tablets (5-10 mg) by mouth at onset of headache for migraine May repeat in 2 hours. Max 6 tablets/24 hours. 18 tablet 3     valACYclovir (VALTREX) 1000 mg tablet 2 tabs at onset of mouth sores.  Repeat dose in 12 hours.       valACYclovir (VALTREX) 1000 mg tablet Take 2 tablets (2,000 mg) by mouth 2 times daily for 1 day 4 tablet 3       Allergies:  Allergies   Allergen Reactions     Iohexol Hives     Other reaction(s): Hives  IV contrast; patient states she tolerates contrast if she gets benadryl before  IV contrast; patient states she tolerates contrast if she gets benadryl before     Baclofen      hives     Bees Hives, Swelling and Difficulty breathing     Contrast Dye      Hives,   Updated 5/10/2016 CT Contrast.     Iodine Hives     Metoclopramide      Other reaction(s): Tremors  LW Reaction: shaking/sweating     Midazolam      Other reaction(s): Agitation     Monosodium Glutamate      Morphine Other (See Comments)     Difficulty with urination     Nsaids Other (See Comments)     GI BLEED x2     Other (Do Not Use) Other (See Comments)     Xanaflex- pt becomes disoriented and loses bladder control     Reglan [Metoclopramide Hcl] Other (See Comments)     shaking     Soma [Carisoprodol] Visual Disturbance     Sleep walking     Tizanidine      Topamax Other (See Comments)     Topamax [Topiramate] Nausea     Tingling  GI/Vomit     Tramadol      Severe Headache, Seizure Risk     Tylenol W/Codeine [Acetaminophen-Codeine]  Nausea and Itching     Tylenol 3     Versed Other (See Comments)     Zolmitriptan      Makes face feel like its twitching     Droperidol Anxiety     Flu Virus Vaccine Rash      Arm swelling       Family history:  Family History   Problem Relation Age of Onset     Gastrointestinal Disease Brother         severe Crohn's     Neurologic Disorder Brother         Seizures post head injury     Depression Brother      Substance Abuse Brother      Genitourinary Problems Father         kidney stones     Diabetes Father      Heart Disease Father         Open heart surgery     Breast Cancer Maternal Grandmother      Parkinsonism Maternal Grandmother      Cerebrovascular Disease Paternal Grandmother      Cancer Maternal Grandfather         Lung     Cardiovascular Paternal Grandfather         Heart Attack     Substance Abuse Mother         in recovery x1 year      Lung Cancer Paternal Uncle      Cancer Paternal Uncle      Lung Cancer Maternal Uncle      Colon Cancer Maternal Uncle        Social history:  Social History     Tobacco Use     Smoking status: Current Every Day Smoker     Packs/day: 0.25     Years: 5.00     Pack years: 1.25     Types: Cigarettes     Smokeless tobacco: Never Used   Substance Use Topics     Alcohol use: Not Currently     Comment: Not since last July 2018     Marital status: single.    Nursing Notes:   Maegan Barton  3/30/2021 11:38 AM  Signed  Chief Complaint   Patient presents with     RECHECK     follow up on rectal pain and bleeding       Vitals:    03/30/21 1136   BP: 119/67   BP Location: Left arm   Patient Position: Sitting   Cuff Size: Adult Regular   Pulse: 72   SpO2: 100%   Weight: 197 lb 3.2 oz       Body mass index is 36.07 kg/m .    Maegan Barton MA        20 minutes spent on the date of the encounter doing chart review, history and exam, documentation and further activities as noted above.     Humera Jason NP-C  Colon and Rectal Surgery  [facility]  Center

## 2021-03-30 NOTE — TELEPHONE ENCOUNTER
Itching all over and skin has turned bright red,     Went to dentist, has infection, given penicillin   -never had before  -hives started within 2 hours of first dose     Did take benadryl hoping it would help  -took about 1 hour ago  -did not help at all      Very tired    Headache     No difficulty breathing or swallowing  No sore throat  No throat swelling  No swelling in face/mouth    Triaged to a disposition of Go to ED. Patient is agreeable.     COVID 19 Nurse Triage Plan/Patient Instructions    Please be aware that novel coronavirus (COVID-19) may be circulating in the community. If you develop symptoms such as fever, cough, or SOB or if you have concerns about the presence of another infection including coronavirus (COVID-19), please contact your health care provider or visit [email or web address].     Disposition/Instructions    ED Visit recommended. Follow protocol based instructions.     Bring Your Own Device:  Please also bring your smart device(s) (smart phones, tablets, laptops) and their charging cables for your personal use and to communicate with your care team during your visit.    Thank you for taking steps to prevent the spread of this virus.  o Limit your contact with others.  o Wear a simple mask to cover your cough.  o Wash your hands well and often.    Resources    [facility]: About COVID-19: [email or web address]    CDC: What to Do If You're Sick: www.cdc.gov/coronavirus/2019-ncov/about/steps-when-sick.html    CDC: Ending Home Isolation: www.cdc.gov/coronavirus/2019-ncov/hcp/disposition-in-home-patients.html     CDC: Caring for Someone: www.cdc.gov/coronavirus/2019-ncov/if-you-are-sick/care-for-someone.html     [facility]: Interim Guidance for Hospital Discharge to Home: www.health.[email or web address].[email or web address]/diseases/coronavirus/hcp/hospdischarge.pdf    [facility] clinical trials (COVID-19 research studies): [email or web address]     Below are the  COVID-19 hotlines at the [place] Department [facility] ([facility]). Interpreters are available.   o For health questions: Call 009-358-4394 or 1-903.857.5342 (7 a.m. to 7 p.m.)  o For questions about schools and childcare: Call 930-318-4405 or 1-636.638.7843 (7 a.m. to 7 p.m.)       Reason for Disposition    [1] Widespread hives AND [2] onset < 2 hours of exposure to 1st dose of drug    Additional Information    Negative: [1] Life-threatening reaction (anaphylaxis) in the past to the same drug AND [2] < 2 hours since exposure    Negative: Difficulty breathing or wheezing    Negative: [1] Hoarseness or cough AND [2] started soon after 1st dose of drug    Negative: [1] Swollen tongue AND [2] started soon after 1st dose of drug    Negative: [1] Purple or blood-colored rash (spots or dots) AND [2] fever    Negative: Sounds like a life-threatening emergency to the triager    Negative: Rash is only on 1 part of the body (localized)    Negative: Taking new non-prescription (OTC) antihistamine, decongestant, ear drops, eye drops, or other OTC cough/cold medicine    Negative: Taking new prescription antihistamine, allergy medicine, asthma medicine, eye drops, ear drops or nose drops    Negative: Rash started more than 3 days after stopping new prescription medicine    Negative: Swollen tongue    Protocols used: RASH - WIDESPREAD ON DRUGS-A-    Ines Villalpando RN on 3/30/2021 at 1:38 AM

## 2021-03-30 NOTE — NURSING NOTE
Chief Complaint   Patient presents with     RECHECK     follow up on rectal pain and bleeding       Vitals:    03/30/21 1136   BP: 119/67   BP Location: Left arm   Patient Position: Sitting   Cuff Size: Adult Regular   Pulse: 72   SpO2: 100%   Weight: 197 lb 3.2 oz       Body mass index is 36.07 kg/m .    Maegan Barton MA

## 2021-03-31 ENCOUNTER — NURSE TRIAGE (OUTPATIENT)
Dept: NURSING | Facility: CLINIC | Age: 31
End: 2021-03-31

## 2021-03-31 ENCOUNTER — TRANSFERRED RECORDS (OUTPATIENT)
Dept: HEALTH INFORMATION MANAGEMENT | Facility: CLINIC | Age: 31
End: 2021-03-31

## 2021-04-01 NOTE — TELEPHONE ENCOUNTER
Patient calling b/c blood pressure is elevated and is not feeling well. Patient reports rash from yesterday, cough started today, feels a bit chilled but not sure if feverish b/c no thermometer.    Patient reports BP of 155/113 taken 15 min ago with heart rate of 93. Patient rechecked while on the phone and BP was 152/103 with increase in heart rate, heart rate: 120, 02 sat is 93 %. Patient says she can't stay awake, feels exhausted, feels achy.  Patient says usual BP is 114/75 and 02 sat is usually higher than 93 %. Patient rechecked heart rate and it was still at 118 about 10 min later. Patient reports she took her blood pressure medications today and says takes them consistently.    Patient reports she had COVID in Jan. She is having some dull and sharp pain in her lungs but says does not feel short of breath. Patient says has pain between head and shoulder but says has chronic neck pain.    Reviewed care advice with caller per RN triage protocol guideline. Advised to be seen in the ED Caller verbalized understanding and agrees with plan.        Reason for Disposition    Dizziness, lightheadedness, or weakness    Additional Information    Negative: Difficult to awaken or acting confused (e.g., disoriented, slurred speech)    Negative: Severe difficulty breathing (e.g., struggling for each breath, speaks in single words)    Negative: [1] Weakness of the face, arm or leg on one side of the body AND [2] new onset    Negative: [1] Numbness (i.e., loss of sensation) of the face, arm or leg on one side of the body AND [2] new onset    Negative: [1] Chest pain lasts > 5 minutes AND [2] history of heart disease  (i.e., heart attack, bypass surgery, angina, angioplasty, CHF)    Negative: [1] Chest pain AND [2] took nitrogylcerin AND [3] pain was not relieved    Negative: Sounds like a life-threatening emergency to the triager    Negative: [1] Systolic BP  >= 160 OR Diastolic >= 100 AND [2] cardiac or neurologic symptoms  (e.g., chest pain, difficulty breathing, unsteady gait, blurred vision)    Negative: [1] Pregnant > 20 weeks (or postpartum < 6 weeks) AND [2] new hand or face swelling    Negative: [1] Pregnant > 20 weeks AND [2] BP Systolic BP  >= 140 OR Diastolic >= 90    Negative: [1] Systolic BP  >= 200 OR Diastolic >= 120  AND [2] having NO cardiac or neurologic symptoms    Negative: [1] Postpartum < 6 weeks AND [2] BP Systolic BP  >= 140 OR Diastolic >= 90    Negative: [1] Systolic BP  >= 180 OR Diastolic >= 110 AND [2] missed most recent dose of blood pressure medication    Systolic BP  >= 180 OR Diastolic >= 110    Negative: Passed out (i.e., lost consciousness, collapsed and was not responding)    Negative: Shock suspected (e.g., cold/pale/clammy skin, too weak to stand, low BP, rapid pulse)    Negative: Difficult to awaken or acting confused (e.g., disoriented, slurred speech)    Negative: Visible sweat on face or sweat dripping down face    Negative: Unable to walk, or can only walk with assistance (e.g., requires support)    Negative: [1] Received SHOCK from implantable cardiac defibrillator AND [2] persisting symptoms (i.e., palpitations, lightheadedness)    Negative: Sounds like a life-threatening emergency to the triager    Negative: Difficulty breathing    Protocols used: HEART RATE AND HEARTBEAT [id number], HIGH BLOOD PRESSURE-A-AH

## 2021-04-02 ENCOUNTER — OFFICE VISIT (OUTPATIENT)
Dept: NEUROLOGY | Facility: CLINIC | Age: 31
End: 2021-04-02
Payer: MEDICARE

## 2021-04-02 VITALS
OXYGEN SATURATION: 97 % | BODY MASS INDEX: 35.15 KG/M2 | DIASTOLIC BLOOD PRESSURE: 78 MMHG | HEIGHT: 62 IN | SYSTOLIC BLOOD PRESSURE: 111 MMHG | WEIGHT: 191 LBS | HEART RATE: 112 BPM

## 2021-04-02 DIAGNOSIS — F44.5 PSYCHOGENIC NONEPILEPTIC SEIZURE: Primary | ICD-10-CM

## 2021-04-02 PROCEDURE — 99213 OFFICE O/P EST LOW 20 MIN: CPT | Performed by: PSYCHIATRY & NEUROLOGY

## 2021-04-02 RX ORDER — PENICILLIN V POTASSIUM 500 MG/1
TABLET, FILM COATED ORAL
COMMUNITY
Start: 2021-03-29 | End: 2021-07-21

## 2021-04-02 ASSESSMENT — MIFFLIN-ST. JEOR: SCORE: 1529.62

## 2021-04-02 ASSESSMENT — PAIN SCALES - GENERAL: PAINLEVEL: NO PAIN (0)

## 2021-04-02 NOTE — PROGRESS NOTES
" NEUROLOGY PROGRESS NOTE   [facility]    Patient:Katy Islas  : 1990  Age: 31 year old  Today's Office Visit: 2021    History of present illness:     The patient is accompanied by her friend-Homa and an IS .  She did not have any spacing out spells since last visit.  She had an ambulatory EEG in March for 2 days which I reviewed and showed diffuse theta slowing during waking, consistent with mild diffuse nonspecific encephalopathy.  No epileptiform discharges or electrographic seizures were recorded.  The patient had an event consisting of coughing hard; then she felt lightheaded and her face went numb and had facial twitches.  There was no abnormal EEG correlate with this event.  Katy reports this was an unusual event and was different than her spacing out spells which was witnessed by her friend. Her friend hasn't seen any spells in the past month.     She still gets headache on the left side of her head where she hit her head to the fridge in 2021. She was sick with pneumonia and Covid at the time.  She has headache every day. It's 8 in scale of 1 -10. She has left eye pain.  She gets nauseous and uses Zofran. She had blurry vision, but it has been improved.  She has photophobia. It continues until \"I lay down and sleep it off\".  She uses Tylenol and Ibuprofen which don't help. She also takes Maxalt which sometimes help and sometimes not. I reviewed the outside head and facial bones CT results on 2021 in care everywhere.  Both were normal.      She is taking gabapentin 300 mg tid for back pain. It makes her dizzy and sleepy and cause weight gain, so she doesn't want to increase the dose.     Current Outpatient Medications   Medication Sig Dispense Refill     albuterol (PROAIR HFA/PROVENTIL HFA/VENTOLIN HFA) 108 (90 Base) MCG/ACT inhaler Inhale 2 puffs into the lungs every 6 hours as needed for shortness of breath / dyspnea or wheezing 3 Inhaler 3     albuterol " (PROVENTIL) (2.5 MG/3ML) 0.083% neb solution Take 1 vial (2.5 mg) by nebulization every 6 hours as needed for shortness of breath / dyspnea or wheezing 50 vial 11     alum & mag hydroxide-simethicone (MAALOX ADVANCED MAX ST) 400-400-40 MG/5ML SUSP suspension Take 15 mLs by mouth every 4 hours as needed for indigestion (mix with lidocaine) 355 mL 11     ARIPiprazole (ABILIFY) 30 MG tablet Take 1 tablet (30 mg) by mouth At Bedtime 30 tablet 1     aspirin (ASA) 81 MG tablet Take 1 tablet (81 mg) by mouth daily 90 tablet 3     azelastine (OPTIVAR) 0.05 % ophthalmic solution Place 1 drop into both eyes 2 times daily 6 mL 11     buprenorphine (SUBUTEX) 2 MG SUBL sublingual tablet Place 2 mg under the tongue 3 times daily        buPROPion (WELLBUTRIN XL) 300 MG 24 hr tablet Take 1 tablet (300 mg) by mouth every morning 90 tablet 1     busPIRone (BUSPAR) 15 MG tablet Take 1 tablet (15 mg) by mouth 2 times daily 60 tablet 5     carisoprodol (SOMA) 350 MG tablet Take 1 tablet (350 mg) by mouth 3 times daily as needed for muscle spasms 10 tablet 0     clonazePAM (KLONOPIN) 1 MG tablet Take 1 tablet (1 mg) by mouth 2 times daily as needed for anxiety 60 tablet 0     cyclobenzaprine (FLEXERIL) 10 MG tablet Take 1 tablet (10 mg) by mouth 3 times daily as needed for muscle spasms or other (back pain) 90 tablet 3     diclofenac (VOLTAREN) 1 % topical gel Place 2 g onto the skin 4 times daily as needed for moderate pain Stop if any gi upset or symptoms 100 g 0     dicyclomine (BENTYL) 20 MG tablet Take 1 tablet (20 mg) by mouth every 6 hours 90 tablet 3     diltiazem 2% in PLO gel Apply small amount to anal opening three times daily for 8 weeks. 60 g 0     diphenhydrAMINE (BENADRYL) 50 MG capsule Take 1-2 capsules ( mg) by mouth every 6 hours as needed for itching, allergies or sleep 90 capsule 3     DULoxetine (CYMBALTA) 60 MG EC capsule Take 1 capsule (60 mg) by mouth daily (Patient taking differently: Take 60 mg by mouth At  Bedtime ) 90 capsule 3     EPINEPHrine (ANY BX GENERIC EQUIV) 0.3 MG/0.3ML injection 2-pack Inject 0.3 mLs (0.3 mg) into the muscle once as needed for anaphylaxis 0.6 mL 3     fexofenadine (ALLEGRA) 180 MG tablet Take 1 tablet (180 mg) by mouth daily 90 tablet 3     furosemide (LASIX) 20 MG tablet Take 1 tablet (20 mg) by mouth daily as needed (edema) 30 tablet 4     gabapentin (NEURONTIN) 300 MG capsule Take 1 capsule (300 mg) by mouth 3 times daily (Patient taking differently: Take 300 mg by mouth 4 times daily ) 270 capsule 3     guaiFENesin-codeine (ROBITUSSIN AC) 100-10 MG/5ML solution Take 5-10 mLs by mouth nightly as needed for cough 50 mL 0     hyoscyamine (LEVSIN) 0.125 MG tablet        lidocaine (XYLOCAINE) 2 % solution Swish and swallow 15 mLs in mouth every 4 hours as needed for other (GERD) ; Max 8 doses/24 hour period. Mix with 15 mLs Maalox or Mylanta. 100 mL 3     medical cannabis (Patient's own supply) (The purpose of this order is to document that the patient reports taking medical cannabis.  This is not a prescription, and is not used to certify that the patient has a qualifying medical condition.) 0 Information only 0     medroxyPROGESTERone (DEPO-PROVERA) 150 MG/ML IM injection Inject 1 mL (150 mg) into the muscle every 3 months 3 mL 3     methocarbamol (ROBAXIN) 750 MG tablet TAKE ONE TABLET BY MOUTH THREE TIMES A DAY AS NEEDED (Patient taking differently: Take 750 mg by mouth 3 times daily as needed for muscle spasms ) 60 tablet 5     metoprolol succinate ER (TOPROL-XL) 50 MG 24 hr tablet Take 1 tablet (50 mg) by mouth daily 90 tablet 3     Multiple Vitamins-Minerals (EQ MULTIVITAMINS ADULT GUMMY) CHEW Take 1 chew tab by mouth daily       naproxen (NAPROSYN) 500 MG tablet Take 1 tablet (500 mg) by mouth 2 times daily (with meals) 14 tablet 0     NONFORMULARY Apply 30 mLs topically 4 times daily       ondansetron (ZOFRAN-ODT) 8 MG ODT tab Take 1 tablet (8 mg) by mouth every 8 hours as needed  "for nausea 30 tablet 4     oxyCODONE-acetaminophen (PERCOCET) 7.5-325 MG per tablet Take 1 tablet by mouth nightly as needed for severe pain 8 tablet 0     pantoprazole (PROTONIX) 40 MG EC tablet Take 1 tablet (40 mg) by mouth 2 times daily Take 30-60 minutes before a meal. 180 tablet 3     prochlorperazine (COMPAZINE) 10 MG tablet Take 1 tablet (10 mg) by mouth every 6 hours as needed for nausea or vomiting 60 tablet 11     rizatriptan (MAXALT-MLT) 5 MG ODT Take 1-2 tablets (5-10 mg) by mouth at onset of headache for migraine May repeat in 2 hours. Max 6 tablets/24 hours. 18 tablet 3     valACYclovir (VALTREX) 1000 mg tablet 2 tabs at onset of mouth sores.  Repeat dose in 12 hours.       lamoTRIgine (LAMICTAL) 25 MG tablet        penicillin V (VEETID) 500 MG tablet TAKE 1 TABLET BY MOUTH FOUR TIMES DAILY FOR 7 DAYS       valACYclovir (VALTREX) 1000 mg tablet Take 2 tablets (2,000 mg) by mouth 2 times daily for 1 day 4 tablet 3     Review of Systems:  Review of system was done and pertinent positive and negative points were mentioned in HPI.    Exam:    /78   Pulse 112   Ht 1.575 m (5' 2\")   Wt 86.6 kg (191 lb)   SpO2 97%   BMI 34.93 kg/m       Wt Readings from Last 5 Encounters:   04/02/21 86.6 kg (191 lb)   03/30/21 89.4 kg (197 lb 3.2 oz)   03/22/21 87.5 kg (193 lb)   02/12/21 86.2 kg (190 lb)   01/19/21 87.1 kg (192 lb)     Alert and oriented, follows commands, face is symmetric, normal tone bulk and strength 5 out of 5 bilaterally, normal finger-to-nose, gait is steady, unable to perform tandem gait.    Assessment/Plan:    1. Spacing out spells: These were witnessed by her friends in the past, however a friend that accompany her today has not seen any recently.  She had an ambulatory EEG to characterize these episodes, however she did not have any during 2 days of ambulatory EEG.  Her EEG showed mild diffuse encephalopathy and no epileptiform discharges or electrographic seizures.  She had an " atypical event of dizziness, facial numbness and twitches with no abnormal EEG correlate.      2.  Migraine headaches: Patient has left-sided headaches almost daily.  She takes Maxalt which sometimes helps, sometimes not.  I advised her to take Maxalt at onset of headache and she may repeat once in 2 hours; but do not exceed 2 pills in 24 hours.  I discussed starting her on a prophylactic medication such as nortriptyline or increase her gabapentin.  The patient is not inclined to do that.  I discussed trying short period of steroids-Medrol Dosepak to abort these persistent headaches, however she states she would get agitated and irritable so she chose not to try.    I would not put a follow-up visit for the patient at this time, she may call and make an appointment as needed.          Isabelle Villanueva MD

## 2021-04-02 NOTE — NURSING NOTE
"Katy Islas's goals for this visit include: return  She requests these members of her care team be copied on today's visit information:     PCP: Aura Rosario    Referring Provider:  No referring provider defined for this encounter.    /78   Pulse 112   Ht 1.575 m (5' 2\")   Wt 86.6 kg (191 lb)   SpO2 97%   BMI 34.93 kg/m      Do you need any medication refills at today's visit? ?  "

## 2021-04-02 NOTE — LETTER
"    2021         RE: Katy Islas  [address]  [place] [address]        Dear Colleague,    Thank you for referring your patient, Ktay Islas, to the [facility] [place]. Please see a copy of my visit note below.     NEUROLOGY PROGRESS NOTE   [facility]    Patient:Katy Islas  : 1990  Age: 31 year old  Today's Office Visit: 2021    History of present illness:     The patient is accompanied by her friend-Homa and an IS .  She did not have any spacing out spells since last visit.  She had an ambulatory EEG in March for 2 days which I reviewed and showed diffuse theta slowing during waking, consistent with mild diffuse nonspecific encephalopathy.  No epileptiform discharges or electrographic seizures were recorded.  The patient had an event consisting of coughing hard; then she felt lightheaded and her face went numb and had facial twitches.  There was no abnormal EEG correlate with this event.  Katy reports this was an unusual event and was different than her spacing out spells which was witnessed by her friend. Her friend hasn't seen any spells in the past month.     She still gets headache on the left side of her head where she hit her head to the fridge in 2021. She was sick with pneumonia and Covid at the time.  She has headache every day. It's 8 in scale of 1 -10. She has left eye pain.  She gets nauseous and uses Zofran. She had blurry vision, but it has been improved.  She has photophobia. It continues until \"I lay down and sleep it off\".  She uses Tylenol and Ibuprofen which don't help. She also takes Maxalt which sometimes help and sometimes not. I reviewed the outside head and facial bones CT results on 2021 in care everywhere.  Both were normal.      She is taking gabapentin 300 mg tid for back pain. It makes her dizzy and sleepy and cause weight gain, so she doesn't want to increase the dose. "     Current Outpatient Medications   Medication Sig Dispense Refill     albuterol (PROAIR HFA/PROVENTIL HFA/VENTOLIN HFA) 108 (90 Base) MCG/ACT inhaler Inhale 2 puffs into the lungs every 6 hours as needed for shortness of breath / dyspnea or wheezing 3 Inhaler 3     albuterol (PROVENTIL) (2.5 MG/3ML) 0.083% neb solution Take 1 vial (2.5 mg) by nebulization every 6 hours as needed for shortness of breath / dyspnea or wheezing 50 vial 11     alum & mag hydroxide-simethicone (MAALOX ADVANCED MAX ST) 400-400-40 MG/5ML SUSP suspension Take 15 mLs by mouth every 4 hours as needed for indigestion (mix with lidocaine) 355 mL 11     ARIPiprazole (ABILIFY) 30 MG tablet Take 1 tablet (30 mg) by mouth At Bedtime 30 tablet 1     aspirin (ASA) 81 MG tablet Take 1 tablet (81 mg) by mouth daily 90 tablet 3     azelastine (OPTIVAR) 0.05 % ophthalmic solution Place 1 drop into both eyes 2 times daily 6 mL 11     buprenorphine (SUBUTEX) 2 MG SUBL sublingual tablet Place 2 mg under the tongue 3 times daily        buPROPion (WELLBUTRIN XL) 300 MG 24 hr tablet Take 1 tablet (300 mg) by mouth every morning 90 tablet 1     busPIRone (BUSPAR) 15 MG tablet Take 1 tablet (15 mg) by mouth 2 times daily 60 tablet 5     carisoprodol (SOMA) 350 MG tablet Take 1 tablet (350 mg) by mouth 3 times daily as needed for muscle spasms 10 tablet 0     clonazePAM (KLONOPIN) 1 MG tablet Take 1 tablet (1 mg) by mouth 2 times daily as needed for anxiety 60 tablet 0     cyclobenzaprine (FLEXERIL) 10 MG tablet Take 1 tablet (10 mg) by mouth 3 times daily as needed for muscle spasms or other (back pain) 90 tablet 3     diclofenac (VOLTAREN) 1 % topical gel Place 2 g onto the skin 4 times daily as needed for moderate pain Stop if any gi upset or symptoms 100 g 0     dicyclomine (BENTYL) 20 MG tablet Take 1 tablet (20 mg) by mouth every 6 hours 90 tablet 3     diltiazem 2% in PLO gel Apply small amount to anal opening three times daily for 8 weeks. 60 g 0      diphenhydrAMINE (BENADRYL) 50 MG capsule Take 1-2 capsules ( mg) by mouth every 6 hours as needed for itching, allergies or sleep 90 capsule 3     DULoxetine (CYMBALTA) 60 MG EC capsule Take 1 capsule (60 mg) by mouth daily (Patient taking differently: Take 60 mg by mouth At Bedtime ) 90 capsule 3     EPINEPHrine (ANY BX GENERIC EQUIV) 0.3 MG/0.3ML injection 2-pack Inject 0.3 mLs (0.3 mg) into the muscle once as needed for anaphylaxis 0.6 mL 3     fexofenadine (ALLEGRA) 180 MG tablet Take 1 tablet (180 mg) by mouth daily 90 tablet 3     furosemide (LASIX) 20 MG tablet Take 1 tablet (20 mg) by mouth daily as needed (edema) 30 tablet 4     gabapentin (NEURONTIN) 300 MG capsule Take 1 capsule (300 mg) by mouth 3 times daily (Patient taking differently: Take 300 mg by mouth 4 times daily ) 270 capsule 3     guaiFENesin-codeine (ROBITUSSIN AC) 100-10 MG/5ML solution Take 5-10 mLs by mouth nightly as needed for cough 50 mL 0     hyoscyamine (LEVSIN) 0.125 MG tablet        lidocaine (XYLOCAINE) 2 % solution Swish and swallow 15 mLs in mouth every 4 hours as needed for other (GERD) ; Max 8 doses/24 hour period. Mix with 15 mLs Maalox or Mylanta. 100 mL 3     medical cannabis (Patient's own supply) (The purpose of this order is to document that the patient reports taking medical cannabis.  This is not a prescription, and is not used to certify that the patient has a qualifying medical condition.) 0 Information only 0     medroxyPROGESTERone (DEPO-PROVERA) 150 MG/ML IM injection Inject 1 mL (150 mg) into the muscle every 3 months 3 mL 3     methocarbamol (ROBAXIN) 750 MG tablet TAKE ONE TABLET BY MOUTH THREE TIMES A DAY AS NEEDED (Patient taking differently: Take 750 mg by mouth 3 times daily as needed for muscle spasms ) 60 tablet 5     metoprolol succinate ER (TOPROL-XL) 50 MG 24 hr tablet Take 1 tablet (50 mg) by mouth daily 90 tablet 3     Multiple Vitamins-Minerals (EQ MULTIVITAMINS ADULT GUMMY) CHEW Take 1 chew  "tab by mouth daily       naproxen (NAPROSYN) 500 MG tablet Take 1 tablet (500 mg) by mouth 2 times daily (with meals) 14 tablet 0     NONFORMULARY Apply 30 mLs topically 4 times daily       ondansetron (ZOFRAN-ODT) 8 MG ODT tab Take 1 tablet (8 mg) by mouth every 8 hours as needed for nausea 30 tablet 4     oxyCODONE-acetaminophen (PERCOCET) 7.5-325 MG per tablet Take 1 tablet by mouth nightly as needed for severe pain 8 tablet 0     pantoprazole (PROTONIX) 40 MG EC tablet Take 1 tablet (40 mg) by mouth 2 times daily Take 30-60 minutes before a meal. 180 tablet 3     prochlorperazine (COMPAZINE) 10 MG tablet Take 1 tablet (10 mg) by mouth every 6 hours as needed for nausea or vomiting 60 tablet 11     rizatriptan (MAXALT-MLT) 5 MG ODT Take 1-2 tablets (5-10 mg) by mouth at onset of headache for migraine May repeat in 2 hours. Max 6 tablets/24 hours. 18 tablet 3     valACYclovir (VALTREX) 1000 mg tablet 2 tabs at onset of mouth sores.  Repeat dose in 12 hours.       lamoTRIgine (LAMICTAL) 25 MG tablet        penicillin V (VEETID) 500 MG tablet TAKE 1 TABLET BY MOUTH FOUR TIMES DAILY FOR 7 DAYS       valACYclovir (VALTREX) 1000 mg tablet Take 2 tablets (2,000 mg) by mouth 2 times daily for 1 day 4 tablet 3     Review of Systems:  Review of system was done and pertinent positive and negative points were mentioned in HPI.    Exam:    /78   Pulse 112   Ht 1.575 m (5' 2\")   Wt 86.6 kg (191 lb)   SpO2 97%   BMI 34.93 kg/m       Wt Readings from Last 5 Encounters:   04/02/21 86.6 kg (191 lb)   03/30/21 89.4 kg (197 lb 3.2 oz)   03/22/21 87.5 kg (193 lb)   02/12/21 86.2 kg (190 lb)   01/19/21 87.1 kg (192 lb)     Alert and oriented, follows commands, face is symmetric, normal tone bulk and strength 5 out of 5 bilaterally, normal finger-to-nose, gait is steady, unable to perform tandem gait.    Assessment/Plan:    1. Spacing out spells: These were witnessed by her friends in the past, however a friend that " accompany her today has not seen any recently.  She had an ambulatory EEG to characterize these episodes, however she did not have any during 2 days of ambulatory EEG.  Her EEG showed mild diffuse encephalopathy and no epileptiform discharges or electrographic seizures.  She had an atypical event of dizziness, facial numbness and twitches with no abnormal EEG correlate.      2.  Migraine headaches: Patient has left-sided headaches almost daily.  She takes Maxalt which sometimes helps, sometimes not.  I advised her to take Maxalt at onset of headache and she may repeat once in 2 hours; but do not exceed 2 pills in 24 hours.  I discussed starting her on a prophylactic medication such as nortriptyline or increase her gabapentin.  The patient is not inclined to do that.  I discussed trying short period of steroids-Medrol Dosepak to abort these persistent headaches, however she states she would get agitated and irritable so she chose not to try.    I would not put a follow-up visit for the patient at this time, she may call and make an appointment as needed.          Isabelle Villanueva MD

## 2021-04-03 ENCOUNTER — MYC MEDICAL ADVICE (OUTPATIENT)
Dept: FAMILY MEDICINE | Facility: OTHER | Age: 31
End: 2021-04-03

## 2021-04-05 NOTE — TELEPHONE ENCOUNTER
Huddled with PCP. Able to do phone visit Thursday. Patient scheduled on 48/21 at 1:30. This nurse will verify this with the patient.   Due to the patient [place] this nurse is unable to advise regarding her questions.       Jose Finney RN, BSN  [place]  April 5, 2021

## 2021-04-05 NOTE — TELEPHONE ENCOUNTER
Called therapist and LM - April 5, 2021 @ 1:15 pm.    Zach Rosario PA-C  [facility]

## 2021-04-06 NOTE — TELEPHONE ENCOUNTER
Patient has read mychart. Will close encounter.     Jose Finney RN, BSN  [place]  April 6, 2021

## 2021-04-08 ENCOUNTER — MYC MEDICAL ADVICE (OUTPATIENT)
Dept: FAMILY MEDICINE | Facility: OTHER | Age: 31
End: 2021-04-08

## 2021-04-08 ENCOUNTER — VIRTUAL VISIT (OUTPATIENT)
Dept: FAMILY MEDICINE | Facility: OTHER | Age: 31
End: 2021-04-08
Payer: MEDICARE

## 2021-04-08 DIAGNOSIS — K21.9 GASTROESOPHAGEAL REFLUX DISEASE WITHOUT ESOPHAGITIS: ICD-10-CM

## 2021-04-08 DIAGNOSIS — N28.9 RENAL LESION: Primary | ICD-10-CM

## 2021-04-08 PROCEDURE — 99442 PR PHYSICIAN TELEPHONE EVALUATION 11-20 MIN: CPT | Performed by: PHYSICIAN ASSISTANT

## 2021-04-08 NOTE — PROGRESS NOTES
Katy is a 31 year old who is being evaluated via a billable telephone visit.      What phone number would you like to be contacted at? See demographics   How would you like to obtain your AVS? MyChart    Assessment & Plan     ICD-10-CM    1. Renal lesion  N28.9 MR Renal wo & w Contrast   2. Gastroesophageal reflux disease without esophagitis  K21.9      1. CT scan [place] in [place]   - Patient had LLQ pain while visiting family in [place]      Reviewed results as scanned in, no lung concerns (states atelectasis vs. Scarring, discussed this)   - Also has 7mm hyperdense lesion in the right kidney   - Recommend getting images to investigate further   - Patient will take 50 mg oral benadryl prior to MRI     2.GERD   - Worsening with Crohn's flare   - OK to temporarily increase Protonix 40 mg from 1 tablet BID to 2 tablets BID      Reviewed use and side effects   - Also OK to take Sucralfate if needed for symptoms   - Should also take her crohn's medications       Review of external notes as documented above    Diagnosis or treatment significantly limited by social determinants of health - Depression     20 minutes spent on the date of the encounter doing chart review, history and exam, documentation and further activities as noted above    The patient indicates understanding of these issues and agrees with the plan.    Due to language barrier, an  was present during the history-taking and subsequent discussion with this patient.    Return in about 1 month (around 5/8/2021) for if not improving. and pending imaging     Aura Tomlinson-[facility]        Alex Burns is a 31 year old who presents for the following health issues     HPI   Patient wants to review CT scan done in [place]   - Patient has small 7mm hyperdense renal lesion on the right     - Got home safe from [place]   - Less anxiety while there   - Just not feeling well lately   - Crohn's flare      Likely  from stress and anxiety         Patient MyChart message 4/3/21   Hi. I ended up in ER in [place], [place] early this morning. Feel like crap and stomach hurt a lot. Everything came back normal except there is a lesion on my right kidney.  There is also scarring in both base of lungs. I have the report and copy of labs for you to look at.  I m in [place], [place] right now with mom and family. I will be back home Wednesday night. Can you fit me in for ?  I also am finally getting injections on . I m looking forward to have a pain free summer!  I saw dr rendon the neurologist. She wanted to discontinue Cymbalta and put me on other meds. I declined. I think that only you, my specialists, and psychiatrist should manage my mental health meds. I felt offended. It is not fake or in my mind. I almost  in . Had a grand mal seizure then I was in a Coma for 3 days. That is NOT FAKE. Do you think all of this problems are from having COVID? I am still waiting for rheumatologist to call me. I will be home Wednesday night.   Oh by the way my bp is much better. Today it was little low,  101/72 and pulse is always high. My anxiety is getting better though!  I m going to bed...  just let me know when Zach is available. Thanks.          Review of Systems   Constitutional, HEENT, cardiovascular, pulmonary, gi and gu systems are negative, except as otherwise noted.      Objective       Vitals:  No vitals were obtained today due to virtual visit.    Physical Exam   healthy, alert and no distress  PSYCH: Alert and oriented times 3; coherent speech, normal   rate and volume, able to articulate logical thoughts, able   to abstract reason, no tangential thoughts, no hallucinations   or delusions  Her affect is normal  RESP: No cough, no audible wheezing, able to talk in full sentences  Remainder of exam unable to be completed due to telephone visits      Diagnostics  Reviewed in Epic  See orders pending in Epic        Phone call duration: 13 minutes

## 2021-04-08 NOTE — TELEPHONE ENCOUNTER
Discuss at next visit     Patient has small 7mm hyperdense renal lesion on the right     Zach Rosario PA-C  [facility]

## 2021-04-08 NOTE — PATIENT INSTRUCTIONS
1. MRI of right renal lesion    2. Increase Protonix to 2 tablets AM and 2 tablets PM        OK take Sucralfate

## 2021-04-13 ENCOUNTER — TRANSFERRED RECORDS (OUTPATIENT)
Dept: HEALTH INFORMATION MANAGEMENT | Facility: CLINIC | Age: 31
End: 2021-04-13

## 2021-04-18 ENCOUNTER — NURSE TRIAGE (OUTPATIENT)
Dept: NURSING | Facility: CLINIC | Age: 31
End: 2021-04-18

## 2021-04-18 NOTE — TELEPHONE ENCOUNTER
"Call received from Katy via [employer] sign- #[id number]    Beginning last night:  - Nausea  - Throwing up all night ~10 episodes  - Temp = 101   - Diarrhea ~5 episodes  - \"Just don't feel well\"    Urinated twice since this morning    This morning  - Horrible headache  - Pain to left forehead down left side of face  - Bending down it is painful    Tried sleeping; No help with tylenol; also tried essential oils    - Does nasal saline irrigation daily  - Uses benadryl - some relief  - Cannot use steroid nasal spray    5:06 pm - Smart web page sent to on-call provider, Dr. Ebonie Ziegler:  MAUREEN [facility]  [phone]  Pt: Katy Kilpatrick  : [birth year]  Vomit x10; Diarrhea x5; T=101; Severe HA; L Sinus pain; Green mucus & post nasal drip; Urine x2 since AM  MRN: [id number]  PCP: Davi    5:16 pm - 2nd Smart web page sent    5:18 pm - Call received from Dr. Ziegler; ER advised    5:21 pm - Notified pt of MD advice    COVID 19 Nurse Triage Plan/Patient Instructions    Please be aware that novel coronavirus (COVID-19) may be circulating in the community. If you develop symptoms such as fever, cough, or SOB or if you have concerns about the presence of another infection including coronavirus (COVID-19), please contact your health care provider or visit [email or web address].     Disposition/Instructions    ED Visit recommended. Follow protocol based instructions.     Bring Your Own Device:  Please also bring your smart device(s) (smart phones, tablets, laptops) and their charging cables for your personal use and to communicate with your care team during your visit.    Thank you for taking steps to prevent the spread of this virus.  o Limit your contact with others.  o Wear a simple mask to cover your cough.  o Wash your hands well and often.    Resources    [facility]: About COVID-19: [email or web address]    CDC: What to Do If You're Sick: " www.cdc.gov/coronavirus/2019-ncov/about/steps-when-sick.html    CDC: Ending Home Isolation: www.cdc.gov/coronavirus/2019-ncov/hcp/disposition-in-home-patients.html     CDC: Caring for Someone: www.cdc.gov/coronavirus/2019-ncov/if-you-are-sick/care-for-someone.html     [facility]: Interim Guidance for Hospital Discharge to Home: www.health.[facility].mn./diseases/coronavirus/hcp/hospdischarge.pdf    [facility] clinical trials (COVID-19 research studies): clinicalaffairs.[email or web address]-clinical-trials     Below are the COVID-19 hotlines at the [place] Department of Health ([facility]). Interpreters are available.   o For health questions: Call 655-084-1046 or 1-357.991.6589 (7 a.m. to 7 p.m.)  o For questions about schools and childcare: Call 486-709-7612 or 1-466.746.5921 (7 a.m. to 7 p.m.)     Maribel Burgos RN  [facility] Nurse Advisors      Reason for Disposition    [1] SEVERE headache AND [2] fever    Additional Information    Negative: Unable to walk, or can only walk with assistance (e.g., requires support)    Negative: Difficulty breathing    Negative: [1] Insulin-dependent diabetes (Type I) AND [2] glucose > 400 mg/dl (22 mmol/l)    Negative: [1] Drinking very little AND [2] dehydration suspected (e.g., no urine > 12 hours, very dry mouth, very lightheaded)    Negative: Patient sounds very sick or weak to the triager    Negative: Severe difficulty breathing (e.g., struggling for each breath, speaks in single words)    Negative: Sounds like a life-threatening emergency to the triager    Negative: [1] Difficulty breathing AND [2] not from stuffy nose (e.g., not relieved by cleaning out the nose)    Protocols used: SINUS PAIN OR CONGESTION-A-AH, NAUSEA-A-AH

## 2021-04-19 ENCOUNTER — MYC REFILL (OUTPATIENT)
Dept: FAMILY MEDICINE | Facility: OTHER | Age: 31
End: 2021-04-19

## 2021-04-19 ENCOUNTER — NURSE TRIAGE (OUTPATIENT)
Dept: FAMILY MEDICINE | Facility: OTHER | Age: 31
End: 2021-04-19

## 2021-04-19 ENCOUNTER — MYC MEDICAL ADVICE (OUTPATIENT)
Dept: FAMILY MEDICINE | Facility: OTHER | Age: 31
End: 2021-04-19

## 2021-04-19 DIAGNOSIS — F41.9 ANXIETY: ICD-10-CM

## 2021-04-19 DIAGNOSIS — F32.0 MILD MAJOR DEPRESSION (H): ICD-10-CM

## 2021-04-19 NOTE — TELEPHONE ENCOUNTER
Please let patient know [facility] is back in tomorrow and will review. TC's  In the meantime is there somewhere else we can look into her having this completed?       NATA Retana CNP  Questions or concerns please feel free to send me a [employer] message or call me  Phone : [phone]

## 2021-04-19 NOTE — TELEPHONE ENCOUNTER
Patient went to [place] ER yesterday.   she states she feels worse than yesterday.    She is having more diarrhea- every hour.  She has acid a lot despite using the GI cocktail.    She is also having grey and black stools.  She has a terrible headache and a low grade fever also.    Per protocol patient advised to go back to the ER.    Patient agrees with the plan.    Marie Nichole RN on 4/19/2021 at 3:45 PM        Reason for Disposition    Bloody, black, or tarry bowel movements    Additional Information    Negative: Shock suspected (e.g., cold/pale/clammy skin, too weak to stand, low BP, rapid pulse)    Negative: Difficult to awaken or acting confused (e.g., disoriented, slurred speech)    Negative: Sounds like a life-threatening emergency to the triager    Negative: Vomiting also present and worse than the diarrhea    Negative: Blood in stool and without diarrhea    Negative: SEVERE abdominal pain (e.g., excruciating) and present > 1 hour    Negative: SEVERE abdominal pain and age > 60    Protocols used: DIARRHEA-A-OH

## 2021-04-20 ENCOUNTER — TRANSFERRED RECORDS (OUTPATIENT)
Dept: HEALTH INFORMATION MANAGEMENT | Facility: CLINIC | Age: 31
End: 2021-04-20

## 2021-04-20 ENCOUNTER — NURSE TRIAGE (OUTPATIENT)
Dept: FAMILY MEDICINE | Facility: OTHER | Age: 31
End: 2021-04-20

## 2021-04-20 RX ORDER — CLONAZEPAM 1 MG/1
1 TABLET ORAL 2 TIMES DAILY PRN
Qty: 60 TABLET | Refills: 0 | Status: SHIPPED | OUTPATIENT
Start: 2021-04-25 | End: 2021-05-17

## 2021-04-20 NOTE — TELEPHONE ENCOUNTER
Due on 4/25/21. Rx sent with that date    Zach Rosario PA-C  [facility]

## 2021-04-20 NOTE — TELEPHONE ENCOUNTER
[facility] orders to [facility], confirm with them that they can do her with spinal cord stimulator. Please assist patient in scheduling.    SK was working on this.     Zach Rosario PA-C  [facility]

## 2021-04-20 NOTE — TELEPHONE ENCOUNTER
Patient was transferred to triage.     Patient was triaged yesterday and was advised to go to the ED.   Patient states she has had 4 episodes of diarrhea today, last night it was black with dark green, today it is more dark green. Patient states there is mucus in her stool. She states that she has been drinking the lidocaine. She was sleeping last night and stated that acid came out of her nose.   She has had an intermittent HA that occurs daily. Usually, she sleeps off her HA and uses lavender/peppermint, and closes her eyes.      PLAN:   Huddle with Zach.       Jose Finney, RN, BSN  [place]  April 20, 2021

## 2021-04-20 NOTE — TELEPHONE ENCOUNTER
Looks like patient is in ED right now.     Zach Rosario PA-C  [facility]

## 2021-04-20 NOTE — TELEPHONE ENCOUNTER
I don't know if she can have an MRI with spinal cord stimulator in place.     TC please find out if this is possible. If not, then we will need to do a CT. I am addressing a renal lesion [place] so any guidance from radiology would be appreciated.     Zach Rosario PA-C  [facility]

## 2021-04-20 NOTE — TELEPHONE ENCOUNTER
Evan returned your call please try her back at [phone].  She is also wondering if she misses your call could it be possible to set up a scheduled time to call?

## 2021-04-20 NOTE — TELEPHONE ENCOUNTER
Called [facility]   Fax [phone]    If spinal cord stimulator is:     [facility] locations she can have MRI    [facility] - they can do at any location

## 2021-04-20 NOTE — TELEPHONE ENCOUNTER
See if we could schedule this for a Monday or Tuesday around 12:30.     Zach Rosario PA-C  [facility]

## 2021-04-21 ENCOUNTER — PATIENT OUTREACH (OUTPATIENT)
Dept: CARE COORDINATION | Facility: CLINIC | Age: 31
End: 2021-04-21

## 2021-04-21 DIAGNOSIS — Z71.89 OTHER SPECIFIED COUNSELING: Primary | Chronic | ICD-10-CM

## 2021-04-21 NOTE — TELEPHONE ENCOUNTER
Called and spoke with Jimenez to schedule time to discuss patient. Jimenez states that Tuesday at 1230 would work best. Please call him at [phone].     Nuria THURMAN CMA

## 2021-04-21 NOTE — PROGRESS NOTES
Clinic Care Coordination Contact  Community Health Worker Initial Outreach    CHW Initial Information Gathering:  Referral Source: ED Follow-Up       Patient accepts CC: No, I really don't need anything, but I appreciate you calling. Patient states she hasn't been feeling good. She has been speaking with nurse triage regarding these symptoms and also went to the ED yesterday.  Patient has our introduction letter from past outreaches and can reach out if anything changes.     Patient has been seen at different hospitals in the last year and been referred to CC 5 times since Jan. Last note in the chart from CHW requested for a clinician to complete chart review on 3/10/2021. Its unclear if a chart review was completed.

## 2021-04-21 NOTE — TELEPHONE ENCOUNTER
Orders & spinal cord stimulator info faxed to [facility] they stated once they received they would reach out to the patient to set up MRI.  Notified patient of this also.

## 2021-04-21 NOTE — TELEPHONE ENCOUNTER
Patient was transferred to triage.     Patient was seen in the ED yesterday because she was making bath bombs. Citric acid splashed into her eyes. Right away she flushed it. However, her eyes still burned and that is why she went into the ER. Her eyes are fine.     Patient is also described  Having a headache rated at a 6/10. She also has been having a burning sensation in her chest so she is taking the lidocaine frequently. Is experiencing diarrhea, slight dizziness, and fatigue. She also describes her jaws are not lined up appropriately. Therefore, she is unable to close her mouth. She feels this is related to the fall back in January.     PLAN:   Huddle with PCP.   The patient is wondering if she can be worked in sooner than 04/26/21.     Jose Finney RN, BSN  [place]  April 21, 2021

## 2021-04-22 ENCOUNTER — NURSE TRIAGE (OUTPATIENT)
Dept: NURSING | Facility: CLINIC | Age: 31
End: 2021-04-22

## 2021-04-22 NOTE — TELEPHONE ENCOUNTER
"Triage Call    Pt calling with help of .    Pt says she had a covid exposure from her friend who found out yesterday she is + covid19.    3 days ago pt visited with her for 30 min, not wearing a face mask.    Currently pt says she has a headache, fever of 100.7 (oral), and body aches.  Also nauseated and vomiting, and has \"Acid reflux\".  Denies breathing problems or chest pain. Also denies cough, rash, loss of taste or smell.    Triaged to Call PCP when office is open, and Care Advice given.  Pt said she called clinic yesterday and had an appointment for Monday.  Instructed pt to call clinic in the AM to request an appointment change to a virtual visit.  Pt is agreeable with plan per deaf interpretor.      COVID 19 Nurse Triage Plan/Patient Instructions    Please be aware that novel coronavirus (COVID-19) may be circulating in the community. If you develop symptoms such as fever, cough, or SOB or if you have concerns about the presence of another infection including coronavirus (COVID-19), please contact your health care provider or visit [email or web address].     Disposition/Instructions    Virtual Visit with provider recommended. Reference Visit Selection Guide.    Thank you for taking steps to prevent the spread of this virus.  o Limit your contact with others.  o Wear a simple mask to cover your cough.  o Wash your hands well and often.    Resources    [facility]: About COVID-19: [email or web address]    CDC: What to Do If You're Sick: www.cdc.gov/coronavirus/2019-ncov/about/steps-when-sick.html    CDC: Ending Home Isolation: www.cdc.gov/coronavirus/2019-ncov/hcp/disposition-in-home-patients.html     CDC: Caring for Someone: www.cdc.gov/coronavirus/2019-ncov/if-you-are-sick/care-for-someone.html     [facility]: Interim Guidance for Hospital Discharge to Home: www.health.[facility].mn.us/diseases/coronavirus/hcp/hospdischarge.pdf    [facility] clinical trials (COVID-19 " research studies): clinicalaffairs.[facility]-clinical-trials     Below are the COVID-19 hotlines at the [place] Department of Health ([facility]). Interpreters are available.   o For health questions: Call 402-618-8742 or 1-112.449.1856 (7 a.m. to 7 p.m.)  o For questions about schools and childcare: Call 272-218-4940 or 1-227.111.8589 (7 a.m. to 7 p.m.)       Edna Burgess RN        Reason for Disposition    [1] COVID-19 infection suspected by caller or triager AND [2] mild symptoms (cough, fever, or others) AND [3] no complications or SOB    Additional Information    Negative: SEVERE difficulty breathing (e.g., struggling for each breath, speaks in single words)    Negative: Difficult to awaken or acting confused (e.g., disoriented, slurred speech)    Negative: Bluish (or gray) lips or face now    Negative: Shock suspected (e.g., cold/pale/clammy skin, too weak to stand, low BP, rapid pulse)    Negative: Sounds like a life-threatening emergency to the triager    Negative: [1] COVID-19 exposure AND [2] no symptoms    Negative: [1] Lives with someone known to have influenza (flu test positive) AND [2] flu-like symptoms (e.g., cough, runny nose, sore throat, SOB; with or without fever)    Negative: [1] Adult with possible COVID-19 symptoms AND [2] triager concerned about severity of symptoms or other causes    Negative: COVID-19 vaccine reaction suspected (e.g., fever, headache, muscle aches) occurring during days 1-3 after getting vaccine    Negative: COVID-19 vaccine, questions about    Negative: COVID-19 and breastfeeding, questions about    Negative: SEVERE or constant chest pain or pressure (Exception: mild central chest pain, present only when coughing)    Negative: MODERATE difficulty breathing (e.g., speaks in phrases, SOB even at rest, pulse 100-120)    Negative: [1] Headache AND [2] stiff neck (can't touch chin to chest)    Negative: MILD difficulty breathing (e.g., minimal/no SOB at rest, SOB with  walking, pulse <100)    Negative: Chest pain or pressure    Negative: Patient sounds very sick or weak to the triager    Negative: Fever > 103 F (39.4 C)    Negative: [1] Fever > 101 F (38.3 C) AND [2] age > 60    Negative: [1] Fever > 100.0 F (37.8 C) AND [2] bedridden (e.g., nursing home patient, CVA, chronic illness, recovering from surgery)    Negative: [1] HIGH RISK patient (e.g., age > 64 years, diabetes, heart or lung disease, weak immune system) AND [2] new or worsening symptoms    Negative: [1] HIGH RISK patient AND [2] influenza is widespread in the community AND [3] ONE OR MORE respiratory symptoms: cough, sore throat, runny or stuffy nose    Negative: [1] HIGH RISK patient AND [2] influenza exposure within the last 7 days AND [3] ONE OR MORE respiratory symptoms: cough, sore throat, runny or stuffy nose    Negative: Fever present > 3 days (72 hours)    Negative: [1] Fever returns after gone for over 24 hours AND [2] symptoms worse or not improved    Negative: [1] Continuous (nonstop) coughing interferes with work or school AND [2] no improvement using cough treatment per protocol    Protocols used: CORONAVIRUS (COVID-19) DIAGNOSED OR [id number]-P-EX 1.3

## 2021-04-22 NOTE — TELEPHONE ENCOUNTER
With complexity of symptoms, should be seen in clinic. I am not in clinic rest of week, so I think she is ok to wait until Monday     Zach Rosario PA-C  [facility]

## 2021-04-22 NOTE — TELEPHONE ENCOUNTER
Patient has been scheduled.   Called to follow up with patient. It appears she spoke to triage nurse earlier today. If patient calls back please follow up with how she is going and we will see her Monday.     Jose Finney RN, BSN  [place]  April 22, 2021

## 2021-04-26 ENCOUNTER — TELEPHONE (OUTPATIENT)
Dept: FAMILY MEDICINE | Facility: OTHER | Age: 31
End: 2021-04-26

## 2021-04-26 NOTE — TELEPHONE ENCOUNTER
You placed a referral for patient to rheumatology on 3/22/21.  Patient has not scheduled as of yet.      Please review and forward to team if follow up with the patient is needed.     Thank you!  Natacha/Clinic Referrals Dyad II

## 2021-04-26 NOTE — CONFIDENTIAL NOTE
Patient missed her appointment today due to sleeping.     Lump ion back of neck that is red and warm to the touch. Patient noticed yesterday. Size of an avocado. Patient is unsure how it got there. Patient describes it as being squishy and is painful to the touch. 5/10. Patient has been using ice. The patient states it has not helped.     Patient was scheduled with Zach tomorrow at 4pm. During conversation the  gave me their insight on how big the lump was. Patient should be evaluated in the clinic setting.       Jose Finney RN, BSN  [place]  April 26, 2021

## 2021-04-27 ENCOUNTER — MYC MEDICAL ADVICE (OUTPATIENT)
Dept: FAMILY MEDICINE | Facility: OTHER | Age: 31
End: 2021-04-27

## 2021-04-27 ENCOUNTER — OFFICE VISIT (OUTPATIENT)
Dept: FAMILY MEDICINE | Facility: OTHER | Age: 31
End: 2021-04-27
Payer: MEDICARE

## 2021-04-27 VITALS
DIASTOLIC BLOOD PRESSURE: 80 MMHG | HEART RATE: 109 BPM | TEMPERATURE: 98.1 F | OXYGEN SATURATION: 100 % | SYSTOLIC BLOOD PRESSURE: 122 MMHG

## 2021-04-27 DIAGNOSIS — R22.1 NECK SWELLING: ICD-10-CM

## 2021-04-27 DIAGNOSIS — R50.9 FEVER, UNSPECIFIED FEVER CAUSE: ICD-10-CM

## 2021-04-27 DIAGNOSIS — J45.30 MILD PERSISTENT ASTHMA WITHOUT COMPLICATION: ICD-10-CM

## 2021-04-27 DIAGNOSIS — R05.9 COUGH: ICD-10-CM

## 2021-04-27 DIAGNOSIS — J20.9 ACUTE BRONCHITIS WITH COEXISTING CONDITION REQUIRING PROPHYLACTIC TREATMENT: ICD-10-CM

## 2021-04-27 DIAGNOSIS — R07.0 THROAT PAIN: Primary | ICD-10-CM

## 2021-04-27 PROBLEM — F60.3 BORDERLINE PERSONALITY DISORDER (H): Status: ACTIVE | Noted: 2021-04-27

## 2021-04-27 LAB
DEPRECATED S PYO AG THROAT QL EIA: NEGATIVE
SPECIMEN SOURCE: NORMAL
SPECIMEN SOURCE: NORMAL
STREP GROUP A PCR: NOT DETECTED

## 2021-04-27 PROCEDURE — 99215 OFFICE O/P EST HI 40 MIN: CPT | Performed by: PHYSICIAN ASSISTANT

## 2021-04-27 PROCEDURE — 87651 STREP A DNA AMP PROBE: CPT | Performed by: PHYSICIAN ASSISTANT

## 2021-04-27 PROCEDURE — 99N1174 PR STATISTIC STREP A RAPID: Performed by: PHYSICIAN ASSISTANT

## 2021-04-27 PROCEDURE — U0005 INFEC AGEN DETEC AMPLI PROBE: HCPCS | Performed by: PHYSICIAN ASSISTANT

## 2021-04-27 PROCEDURE — U0003 INFECTIOUS AGENT DETECTION BY NUCLEIC ACID (DNA OR RNA); SEVERE ACUTE RESPIRATORY SYNDROME CORONAVIRUS 2 (SARS-COV-2) (CORONAVIRUS DISEASE [COVID-19]), AMPLIFIED PROBE TECHNIQUE, MAKING USE OF HIGH THROUGHPUT TECHNOLOGIES AS DESCRIBED BY CMS-2020-01-R: HCPCS | Performed by: PHYSICIAN ASSISTANT

## 2021-04-27 RX ORDER — CODEINE PHOSPHATE AND GUAIFENESIN 10; 100 MG/5ML; MG/5ML
1-2 SOLUTION ORAL
Qty: 118 ML | Refills: 0 | Status: SHIPPED | OUTPATIENT
Start: 2021-04-27 | End: 2021-07-21

## 2021-04-27 RX ORDER — ALBUTEROL SULFATE 0.83 MG/ML
2.5 SOLUTION RESPIRATORY (INHALATION) EVERY 6 HOURS PRN
Qty: 500 ML | Refills: 3 | Status: SHIPPED | OUTPATIENT
Start: 2021-04-27 | End: 2021-07-26

## 2021-04-27 RX ORDER — AZITHROMYCIN 250 MG/1
TABLET, FILM COATED ORAL
Qty: 6 TABLET | Refills: 0 | Status: SHIPPED | OUTPATIENT
Start: 2021-04-27 | End: 2021-05-12

## 2021-04-27 RX ORDER — METHYLPREDNISOLONE 4 MG
TABLET, DOSE PACK ORAL
Qty: 21 TABLET | Refills: 0 | Status: SHIPPED | OUTPATIENT
Start: 2021-04-27 | End: 2021-05-27

## 2021-04-27 NOTE — TELEPHONE ENCOUNTER
Patient was seen with Zach on 04/27/21.   Jose Finney RN, BSN  [place]  April 27, 2021

## 2021-04-27 NOTE — TELEPHONE ENCOUNTER
On schedule for today - agree should be seen for mass, however we should call and get update on fever, headache, body aches from encounter dated 4/22/21. She also has known COVID-19 exposure so will need to be PUI or may be better seen in urgent care if still having active symptoms of COVID-19.    Zach Rosario PA-C  [facility]

## 2021-04-27 NOTE — TELEPHONE ENCOUNTER
Left message for patient to return call. Wanting to confirm she is coming to her appointment today. Also wanted to check in with her regarding if she is still having COVID symptoms. Please transfer the patient to the writer.   Mychart message was also sent.     Jose Finney RN, BSN  [place]  April 27, 2021

## 2021-04-27 NOTE — PATIENT INSTRUCTIONS
"  - Start antibiotic tonight, as directed     - Start medrol dose pack (steroid) tomorrow AM       - Albuterol inhaler - 2 puffs or 1 nebulizer treatment       Three times a day for 5-7 days, then return to as needed     - Must stay home and quarantine for 2 weeks from symptom onset         [facility]  will call you to coordinate your care as prescribed by your provider. A representative will call you within 1 business day to help schedule your appointment, or you may contact the  Representative at [phone].      Discharge Instructions for COVID-19 Patients  You have--or may have--COVID-19. Please follow the instructions listed below.   If you have a weakened immune system, discuss with your doctor any other actions you need to take.  How can I protect others?  If you have symptoms (fever, cough, body aches or trouble breathing):    Stay home and away from others (self-isolate) until:  ? Your other symptoms have resolved (gotten better). And   ? You've had no fever--and no medicine that reduces fever--for 1 full day (24 hours). And   ? At least 10 days have passed since your symptoms started. (You may need to wait 20 days. Follow the advice of your care team.)  If you don't show symptoms, but testing showed that you have COVID-19:    Stay home and away from others (self-isolate) until at least 10 days have passed since the date of your first positive COVID-19 test.  During this time    Stay in your own room, even for meals. Use your own bathroom if you can.    Stay away from others in your home. No hugging, kissing or shaking hands. No visitors.    Don't go to work, school or anywhere else.    Clean \"high touch\" surfaces often (doorknobs, counters, handles). Use household cleaning spray or wipes.    You'll find a full list of  on the EPA website: www.epa.gov/pesticide-registration/list-n-disinfectants-use-against-sars-cov-2.    Cover your mouth and nose with a " mask or other face covering to avoid spreading germs.    Wash your hands and face often. Use soap and water.    Caregivers in these groups are at risk for severe illness due to COVID-19:  ? People 65 years and older  ? People who live in a nursing home or long-term care facility  ? People with chronic disease (lung, heart, cancer, diabetes, kidney, liver, immunologic)  ? People who have a weakened immune system, including those who:    Are in cancer treatment    Take medicine that weakens the immune system, such as corticosteroids    Had a bone marrow or organ transplant    Have an immune deficiency    Have poorly controlled HIV or AIDS    Are obese (body mass index of 40 or higher)    Smoke regularly    Caregivers should wear gloves while washing dishes, handling laundry and cleaning bedrooms and bathrooms.    Use caution when washing and drying laundry: Don't shake dirty laundry and use the warmest water setting that you can.    For more tips on managing your health at home, go to www.cdc.gov/coronavirus/2019-ncov/downloads/10Things.pdf.  How can I take care of myself at home?  1. Get lots of rest. Drink extra fluids (unless a doctor has told you not to).  2. Take Tylenol (acetaminophen) for fever or pain. If you have liver or kidney problems, ask your family doctor if it's okay to take Tylenol.   Adults can take either:   ? 650 mg (two 325 mg pills) every 4 to 6 hours, or   ? 1,000 mg (two 500 mg pills) every 8 hours as needed.  ? Note: Don't take more than 3,000 mg in one day. Acetaminophen is found in many medicines (both prescribed and over-the-counter medicines). Read all labels to be sure you don't take too much.   For children, check the Tylenol bottle for the right dose. The dose is based on the child's age or weight.  3. If you have other health problems (like cancer, heart failure, an organ transplant or severe kidney disease): Call your specialty clinic if you don't feel better in the next 2  days.  4. Know when to call 911. Emergency warning signs include:  ? Trouble breathing or shortness of breath  ? Pain or pressure in the chest that doesn't go away  ? Feeling confused like you haven't felt before, or not being able to wake up  ? Bluish-colored lips or face  5. Your doctor may have prescribed a blood thinner medicine. Follow their instructions.  Where can I get more information?    [facility] - About COVID-19:   [email or web address]."[email or web address]/    CDC - What to Do If You're Sick: www.cdc.gov/coronavirus/2019-ncov/about/steps-when-sick.html    CDC - Ending Home Isolation: www.cdc.gov/coronavirus/2019-ncov/hcp/disposition-in-home-patients.html    CDC - Caring for Someone: www.cdc.gov/coronavirus/2019-ncov/if-you-are-sick/care-for-someone.html    [facility] - Interim Guidance for Hospital Discharge to Home: www.[facility].[place]/diseases/coronavirus/hcp/hospdischarge.pdf    Below are the COVID-19 hotlines at the [facility] ([facility]). Interpreters are available.  ? For health questions: Call 446-765-3680 or 1-478.956.2505 (7 a.m. to 7 p.m.)  ? For questions about schools and childcare: Call 068-148-5073 or 1-580.842.2717 (7 a.m. to 7 p.m.)    For informational purposes only. Not to replace the advice of your health care provider. Clinically reviewed by Dr. Valentino Vitale.   Copyright   2020 [employer]. All rights reserved. [employer] 017801 - REV 01/05/21.

## 2021-04-27 NOTE — PROGRESS NOTES
"Assessment & Plan     ICD-10-CM    1. Throat pain  R07.0 Streptococcus A Rapid Scr w Reflx to PCR     azithromycin (ZITHROMAX) 250 MG tablet     methylPREDNISolone (MEDROL DOSEPAK) 4 MG tablet therapy pack     Group A Streptococcus PCR Throat Swab   2. Fever, unspecified fever cause  R50.9 Streptococcus A Rapid Scr w Reflx to PCR     Symptomatic COVID-19 Virus (Coronavirus) by PCR     azithromycin (ZITHROMAX) 250 MG tablet     methylPREDNISolone (MEDROL DOSEPAK) 4 MG tablet therapy pack   3. Cough  R05 Symptomatic COVID-19 Virus (Coronavirus) by PCR     azithromycin (ZITHROMAX) 250 MG tablet     methylPREDNISolone (MEDROL DOSEPAK) 4 MG tablet therapy pack     guaiFENesin-codeine (ROBITUSSIN AC) 100-10 MG/5ML solution   4. Acute bronchitis with coexisting condition requiring prophylactic treatment  J20.9 methylPREDNISolone (MEDROL DOSEPAK) 4 MG tablet therapy pack   5. Mild persistent asthma without complication  J45.30 albuterol (PROVENTIL) (2.5 MG/3ML) 0.083% neb solution   6. Neck swelling  R22.1      - Patient with positive covid exposure, symptomatic      RN deemed patient needed to be seen in clinic due to \"avacado mass\" behind neck     - Patient with recent travel out of state, feels not felt well since then, was with a friend last week that tested positive for COVID, symptoms have worsened in the last 3-4 days, also dealing with continued issues due to her Crohn's disease      Recommend checking for strep and COVID-19      Await these results      However, patient with known asthma, lungs with occasional rhonchi      Recommend treatment for bronchitis      Patient with many allergies, will do Zpack, has done well on this in the past     Discussed antibiotic use, duration, and side effects     Also recommend scheduled albuterol - 2 puffs or 1 nebulizer treatment TID for 5-7 days      Will also do steroid pack due to lungs and sinus appearance      Reviewed medrol dose pack use and side effects, start tomorrow " AM to avoid insomnia      Patient also requesting Robitussin AC as not able to sleep      Will give small amount of this, reviewed use and side effects including sedation and addiction, no driving on this medication, will not refill      reviewed, as expected      Recommend quarantine despite testing      Discussed warning signs that would warrant return to clinic/ED       Hand out given in AVS     - Swelling on base of cervical spine/upper thoracic spine consistent with muscular etiology, likely from spending a lot of time looking down      No mass, fluctuance, erythema,or tenderness     Recommend monitoring and neck stretches, can also ice if helpful       Review of the result(s) of each unique test - None    Diagnosis or treatment significantly limited by social determinants of health - Depression    40 minutes spent on the date of the encounter doing chart review, history and exam, documentation and further activities as noted above    The patient indicates understanding of these issues and agrees with the plan.    PA student LINETTE MezaS2 was also present     Due to language barrier, an  was present during the history-taking and subsequent discussion (and for part of the physical exam) with this patient.    Both student and interpretor, along with provider, were gowned, gloved, wearing N95s, and face shields.     Student and interpretor were kept 6 feet away from patient, only provider examined patient     [facility]     Katy Islas is a 31 year old female who presents to clinic today for the following health issues:    HPI   Acute Illness  Acute illness concerns: Fever, doesn't feel good    Onset/Duration: unknown   Symptoms:  Fever: YES, 101.2   Chills/Sweats: yes   Headache (location?): YES,   Sinus Pressure: no  Conjunctivitis:  no  Ear Pain: YES: left and side of face   Rhinorrhea: YES  Congestion:  "no  Sore Throat: YES  Cough: YES-productive of yellow sputum, productive of green sputum, with shortness of breath  Wheeze: no  Decreased Appetite: YES  Nausea: YES  Vomiting: YES  Diarrhea: YES  Dysuria/Freq.: no  Dysuria or Hematuria: no  Fatigue/Achiness: YES  Sick/Strep Exposure: YES  Therapies tried and outcome: Tylenol     - Friend positive on 2 days ago, was with her last week       Concern - Lump on back of neck   Onset: few days ago   Description: Swollen and red, warm, hurt   Progression of Symptoms:  same  Accompanying Signs & Symptoms: pimples   Previous history of similar problem: none   Therapies tried and outcome: biofreeze         RN Triage from 4/22/21   Triage Call     Pt calling with help of .     Pt says she had a covid exposure from her friend who found out yesterday she is + covid19.    3 days ago pt visited with her for 30 min, not wearing a face mask.     Currently pt says she has a headache, fever of 100.7 (oral), and body aches.  Also nauseated and vomiting, and has \"Acid reflux\".  Denies breathing problems or chest pain. Also denies cough, rash, loss of taste or smell.     Triaged to Call PCP when office is open, and Care Advice given.  Pt said she called clinic yesterday and had an appointment for Monday.  Instructed pt to call clinic in the AM to request an appointment change to a virtual visit.  Pt is agreeable with plan per deaf interpretor.       RN Triage from yesterday   Patient missed her appointment today due to sleeping.      Lump ion back of neck that is red and warm to the touch. Patient noticed yesterday. Size of an avocado. Patient is unsure how it got there. Patient describes it as being squishy and is painful to the touch. 5/10. Patient has been using ice. The patient states it has not helped.      Patient was scheduled with Zach tomorrow at 4pm. During conversation the  gave me their insight on how big the lump was. Patient should be evaluated " in the clinic setting.        Review of Systems   Constitutional, HEENT, cardiovascular, pulmonary, gi and gu systems are negative, except as otherwise noted.      Objective    /80   Pulse 109   Temp 98.1  F (36.7  C)   SpO2 100%   There is no height or weight on file to calculate BMI.  Physical Exam   GENERAL APPEARANCE: moderately ill appearing, alert and no distress  EYES: Eyes grossly normal to inspection, PERRLA, conjunctivae and sclerae without injection or discharge, EOM intact   HENT: Bilateral ear canals without erythema or cerumen, bilateral TM's pearly grey with normal light reflex, scant small clear effusions with no injection or bulging, nasal turbinates with moderate swelling and erythema, white nasal discharge, mouth without ulcers or lesions, oropharynx mildly erythematous, oral mucous membranes moist, no sinus tenderness   NECK: No adenopathy in cervical or supraclavicular regions        Mild edema located base of cervical spine/upper thoracic spine that is not tender, warm, or fluctuant, no masses   RESP: Occasional rale in bilateral bronchial areas, remainder of lungs clear to auscultation - no rales, rhonchi or wheezes   CV: Regular rates and rhythm, normal S1 S2, no S3 or S4, no murmur, click or rub  MS: No musculoskeletal defects are noted and gait is age appropriate without ataxia   SKIN: No suspicious lesions or rashes, hydration status appears adeuqate with normal skin turgor   PSYCH: Alert and oriented x3; speech- coherent , normal rate and volume; able to articulate logical thoughts, able to abstract reason, no tangential thoughts, no hallucinations or delusions, mentation appears normal, Mood is euthymic. Affect is appropriate for this mood state and bright. Thought content is free of suicidal ideation, hallucinations, and delusions. Dress is adequate and upkept. Eye contact is good during conversation.       Diagnostics  Reviewed in Epic  See orders pending in Epic

## 2021-04-27 NOTE — TELEPHONE ENCOUNTER
Tried calling the patient again but the phone went straight to voice mail.     Jose Finney RN, BSN  [place]  April 27, 2021

## 2021-04-28 ENCOUNTER — TELEPHONE (OUTPATIENT)
Dept: FAMILY MEDICINE | Facility: OTHER | Age: 31
End: 2021-04-28

## 2021-04-28 LAB
SARS-COV-2 RNA RESP QL NAA+PROBE: NOT DETECTED
SPECIMEN SOURCE: NORMAL

## 2021-04-28 NOTE — RESULT ENCOUNTER NOTE
Savana Burns    Your results were negative for strep.     The results are attached for your review.       Zach Rosario PA-C

## 2021-04-28 NOTE — TELEPHONE ENCOUNTER
Prior Authorization Retail Medication Request    Medication/Dose: albuterol (PROVENTIL) (2.5 MG/3ML) 0.083% neb solution  ICD code (if different than what is on RX):    Previously Tried and Failed:   Rationale:     Insurance Name:   Insurance ID:        Pharmacy Information (if different than what is on RX)  Name:    Phone:

## 2021-04-29 ENCOUNTER — MYC MEDICAL ADVICE (OUTPATIENT)
Dept: GENERAL RADIOLOGY | Facility: CLINIC | Age: 31
End: 2021-04-29

## 2021-04-29 ENCOUNTER — TELEPHONE (OUTPATIENT)
Dept: FAMILY MEDICINE | Facility: OTHER | Age: 31
End: 2021-04-29

## 2021-04-29 ENCOUNTER — MYC MEDICAL ADVICE (OUTPATIENT)
Dept: FAMILY MEDICINE | Facility: OTHER | Age: 31
End: 2021-04-29

## 2021-04-29 NOTE — TELEPHONE ENCOUNTER
Patient did respond to TrackR message that was sent, I am not sure how I misread your note. I did forward the patients response to your inbasket as she also had a follow up question.    Jany Castro XRO/

## 2021-04-29 NOTE — TELEPHONE ENCOUNTER
Carmelita Isabell was the  on 4/27/21 and was in the PUI room with patient for about 30 minutes and she is wondering about the results of covid Strep if she can get have those results to protect her self if needed... I see that they were both negative but didn't give that out to her.. wanted to get your advise on that.... Carmelita can be reach at [phone]  She has been taking precautions just in case but doesn't want to if she doesn't have to.... I told her that will get back to her today.

## 2021-04-29 NOTE — TELEPHONE ENCOUNTER
No, it does not come in any other form. Recommend OTC Delsym or Robitussin.     She doesn't have pneumonia, she has bronchitis. She is to quarantine for 10 days because she also had a positive covid exposure EVEN if covid test negative.     Zach Rosario PA-C  [facility]

## 2021-04-29 NOTE — RESULT ENCOUNTER NOTE
Savana Burns    Your results were negative for COVID and strep.     The results are attached for your review.       Zach Rosario PA-C

## 2021-04-29 NOTE — TELEPHONE ENCOUNTER
Attempted to call, unable to reach via phone.  Sent a Intellinote message to patient.     Jany Castro XRO/

## 2021-04-29 NOTE — TELEPHONE ENCOUNTER
PRIOR AUTHORIZATION DENIED    Medication: albuterol (PROVENTIL) (2.5 MG/3ML) 0.083% neb solution    Denial Date: 4/29/2021    Denial Rational:  Medicare Part D Insurance does not cover medications used in a nebulizer. This will have to be billed through medical benefits (Medicare Part B).  This may be covered under Medicare Part A or Part B.  Patient can call 1-800-MEDICARE for more information. Central PA team does not process medical benefit PA's.              Appeal Information:  N/A

## 2021-04-29 NOTE — TELEPHONE ENCOUNTER
Central Prior Authorization Team   Phone: [phone]      PA Initiation    Medication: albuterol (PROVENTIL) (2.5 MG/3ML) 0.083% neb solution  Insurance Company: [facility] [employer] - Phone [phone] Fax [phone]  Pharmacy Filling the Rx[id number] [place], [place] - [address]  Filling Pharmacy Phone: [phone]  Filling Pharmacy Fax:    Start Date: 4/29/2021

## 2021-04-29 NOTE — TELEPHONE ENCOUNTER
notified, per message from provider, no personal information or results were given.    Jany Castro XRO/

## 2021-04-29 NOTE — TELEPHONE ENCOUNTER
Please read entire message below. I did not request contacting patient. I said to contact admin (Loren).     Admin states we can NOT share patient information.     Call interpretor and let her know my answer below - that she has little to know risk of getting COVID due to proper precautions with PPE.     Zach Rosario PA-C  [facility]

## 2021-04-29 NOTE — TELEPHONE ENCOUNTER
With proper PPE, there is minimal to no possibility of exposure. She was gowned, had N95 mask, and face shield. I also kept her 6 feet away from patient.     Patient was negative. Please confirm with admin if it is ok we let her know this.     Zach Rosario PA-C  [facility]

## 2021-04-30 NOTE — TELEPHONE ENCOUNTER
Please notify patient of below - looks like medicare doesn't cover anything in nebulizer. She will have to use albuterol inhaler or pay out of pocket.    Zach Rosario PA-C  [facility]

## 2021-05-03 ENCOUNTER — TELEPHONE (OUTPATIENT)
Dept: GASTROENTEROLOGY | Facility: CLINIC | Age: 31
End: 2021-05-03

## 2021-05-03 ENCOUNTER — MYC MEDICAL ADVICE (OUTPATIENT)
Dept: FAMILY MEDICINE | Facility: OTHER | Age: 31
End: 2021-05-03

## 2021-05-03 DIAGNOSIS — R61 GENERALIZED HYPERHIDROSIS: Primary | ICD-10-CM

## 2021-05-03 NOTE — TELEPHONE ENCOUNTER
[facility] Call Center    Phone Message    May a detailed message be left on voicemail: no     Reason for Call: Other: Patient is looking for sooner appointment.  Scheduled first available 09/01/2021 and added to wait list. Heartburn is bad as well as bowels. Please advise. Thank you.      Action Taken: Message routed to:  Adult Clinics: Gastroenterology (GI) p [id number]    Travel Screening: Not Applicable

## 2021-05-03 NOTE — TELEPHONE ENCOUNTER
Zach, please advise regarding the patient's message.     Jose Finney RN, BSN  [place]  May 3, 2021

## 2021-05-04 NOTE — TELEPHONE ENCOUNTER
Called patient and informed her of message below.     MAUREEN Jacques/[place] [place] [facility]

## 2021-05-04 NOTE — TELEPHONE ENCOUNTER
I have sent Drysol solution for her to apply to sweat areas at night. Continue to use deodorant during the day.     Photo of boil did not come through. Continue with conservative cares as she has been doing.     Zach Rosario PA-C  [facility]

## 2021-05-05 ENCOUNTER — HOSPITAL ENCOUNTER (EMERGENCY)
Facility: CLINIC | Age: 31
Discharge: HOME | End: 2021-05-05
Attending: FAMILY MEDICINE | Admitting: FAMILY MEDICINE
Payer: MEDICARE

## 2021-05-05 ENCOUNTER — TELEPHONE (OUTPATIENT)
Dept: FAMILY MEDICINE | Facility: OTHER | Age: 31
End: 2021-05-05

## 2021-05-05 ENCOUNTER — APPOINTMENT (OUTPATIENT)
Dept: CT IMAGING | Facility: CLINIC | Age: 31
End: 2021-05-05
Attending: FAMILY MEDICINE
Payer: MEDICARE

## 2021-05-05 VITALS
TEMPERATURE: 98.4 F | SYSTOLIC BLOOD PRESSURE: 152 MMHG | BODY MASS INDEX: 35.03 KG/M2 | RESPIRATION RATE: 15 BRPM | DIASTOLIC BLOOD PRESSURE: 72 MMHG | OXYGEN SATURATION: 97 % | HEART RATE: 70 BPM | WEIGHT: 191.5 LBS

## 2021-05-05 DIAGNOSIS — R11.10 VOMITING AND DIARRHEA: ICD-10-CM

## 2021-05-05 DIAGNOSIS — K50.10 CROHN'S DISEASE OF COLON WITHOUT COMPLICATION (H): ICD-10-CM

## 2021-05-05 DIAGNOSIS — R19.7 DIARRHEA, UNSPECIFIED TYPE: Primary | ICD-10-CM

## 2021-05-05 DIAGNOSIS — R19.7 VOMITING AND DIARRHEA: ICD-10-CM

## 2021-05-05 DIAGNOSIS — J20.9 ACUTE BRONCHITIS TREATED WITH ANTIBIOTICS IN THE PAST 60 DAYS: ICD-10-CM

## 2021-05-05 DIAGNOSIS — R10.13 ABDOMINAL PAIN, EPIGASTRIC: ICD-10-CM

## 2021-05-05 LAB
ALBUMIN SERPL-MCNC: 4.3 G/DL (ref 3.4–5)
ALBUMIN UR-MCNC: NEGATIVE MG/DL
ALP SERPL-CCNC: 97 U/L (ref 40–150)
ALT SERPL W P-5'-P-CCNC: 94 U/L (ref 0–50)
ANION GAP SERPL CALCULATED.3IONS-SCNC: 3 MMOL/L (ref 3–14)
APPEARANCE UR: ABNORMAL
AST SERPL W P-5'-P-CCNC: 37 U/L (ref 0–45)
BACTERIA #/AREA URNS HPF: ABNORMAL /HPF
BASOPHILS # BLD AUTO: 0 10E9/L (ref 0–0.2)
BASOPHILS NFR BLD AUTO: 0.4 %
BILIRUB SERPL-MCNC: 0.4 MG/DL (ref 0.2–1.3)
BILIRUB UR QL STRIP: NEGATIVE
BUN SERPL-MCNC: 10 MG/DL (ref 7–30)
CALCIUM SERPL-MCNC: 9.2 MG/DL (ref 8.5–10.1)
CHLORIDE SERPL-SCNC: 109 MMOL/L (ref 94–109)
CO2 SERPL-SCNC: 28 MMOL/L (ref 20–32)
COLOR UR AUTO: YELLOW
CREAT SERPL-MCNC: 0.83 MG/DL (ref 0.52–1.04)
DIFFERENTIAL METHOD BLD: ABNORMAL
EOSINOPHIL # BLD AUTO: 0 10E9/L (ref 0–0.7)
EOSINOPHIL NFR BLD AUTO: 0.2 %
ERYTHROCYTE [DISTWIDTH] IN BLOOD BY AUTOMATED COUNT: 12.4 % (ref 10–15)
GFR SERPL CREATININE-BSD FRML MDRD: >90 ML/MIN/{1.73_M2}
GLUCOSE SERPL-MCNC: 87 MG/DL (ref 70–99)
GLUCOSE UR STRIP-MCNC: NEGATIVE MG/DL
HCT VFR BLD AUTO: 41.5 % (ref 35–47)
HGB BLD-MCNC: 14.5 G/DL (ref 11.7–15.7)
HGB UR QL STRIP: NEGATIVE
IMM GRANULOCYTES # BLD: 0 10E9/L (ref 0–0.4)
IMM GRANULOCYTES NFR BLD: 0.4 %
KETONES UR STRIP-MCNC: NEGATIVE MG/DL
LACTATE BLD-SCNC: 1.1 MMOL/L (ref 0.7–2)
LEUKOCYTE ESTERASE UR QL STRIP: NEGATIVE
LIPASE SERPL-CCNC: 49 U/L (ref 73–393)
LYMPHOCYTES # BLD AUTO: 1.3 10E9/L (ref 0.8–5.3)
LYMPHOCYTES NFR BLD AUTO: 23.6 %
MCH RBC QN AUTO: 34 PG (ref 26.5–33)
MCHC RBC AUTO-ENTMCNC: 34.9 G/DL (ref 31.5–36.5)
MCV RBC AUTO: 97 FL (ref 78–100)
MONOCYTES # BLD AUTO: 0.5 10E9/L (ref 0–1.3)
MONOCYTES NFR BLD AUTO: 8.4 %
MUCOUS THREADS #/AREA URNS LPF: PRESENT /LPF
NEUTROPHILS # BLD AUTO: 3.6 10E9/L (ref 1.6–8.3)
NEUTROPHILS NFR BLD AUTO: 67 %
NITRATE UR QL: NEGATIVE
NRBC # BLD AUTO: 0 10*3/UL
NRBC BLD AUTO-RTO: 0 /100
PH UR STRIP: 6 PH (ref 5–7)
PLATELET # BLD AUTO: 262 10E9/L (ref 150–450)
POTASSIUM SERPL-SCNC: 3.8 MMOL/L (ref 3.4–5.3)
PROT SERPL-MCNC: 7.7 G/DL (ref 6.8–8.8)
RBC # BLD AUTO: 4.27 10E12/L (ref 3.8–5.2)
RBC #/AREA URNS AUTO: 2 /HPF (ref 0–2)
SODIUM SERPL-SCNC: 140 MMOL/L (ref 133–144)
SOURCE: ABNORMAL
SP GR UR STRIP: >1.035 (ref 1–1.03)
SQUAMOUS #/AREA URNS AUTO: 5 /HPF (ref 0–1)
UROBILINOGEN UR STRIP-MCNC: 0 MG/DL (ref 0–2)
WBC # BLD AUTO: 5.4 10E9/L (ref 4–11)
WBC #/AREA URNS AUTO: 3 /HPF (ref 0–5)

## 2021-05-05 PROCEDURE — 96361 HYDRATE IV INFUSION ADD-ON: CPT | Performed by: FAMILY MEDICINE

## 2021-05-05 PROCEDURE — 99285 EMERGENCY DEPT VISIT HI MDM: CPT | Performed by: FAMILY MEDICINE

## 2021-05-05 PROCEDURE — 250N000009 HC RX 250: Performed by: FAMILY MEDICINE

## 2021-05-05 PROCEDURE — 99285 EMERGENCY DEPT VISIT HI MDM: CPT | Mod: 25 | Performed by: FAMILY MEDICINE

## 2021-05-05 PROCEDURE — 96374 THER/PROPH/DIAG INJ IV PUSH: CPT | Mod: 59 | Performed by: FAMILY MEDICINE

## 2021-05-05 PROCEDURE — 83690 ASSAY OF LIPASE: CPT | Performed by: FAMILY MEDICINE

## 2021-05-05 PROCEDURE — 83605 ASSAY OF LACTIC ACID: CPT | Performed by: FAMILY MEDICINE

## 2021-05-05 PROCEDURE — 96375 TX/PRO/DX INJ NEW DRUG ADDON: CPT | Performed by: FAMILY MEDICINE

## 2021-05-05 PROCEDURE — 80053 COMPREHEN METABOLIC PANEL: CPT | Performed by: FAMILY MEDICINE

## 2021-05-05 PROCEDURE — 250N000011 HC RX IP 250 OP 636: Performed by: FAMILY MEDICINE

## 2021-05-05 PROCEDURE — G1004 CDSM NDSC: HCPCS

## 2021-05-05 PROCEDURE — 258N000003 HC RX IP 258 OP 636: Performed by: FAMILY MEDICINE

## 2021-05-05 PROCEDURE — 85025 COMPLETE CBC W/AUTO DIFF WBC: CPT | Performed by: FAMILY MEDICINE

## 2021-05-05 PROCEDURE — 96376 TX/PRO/DX INJ SAME DRUG ADON: CPT | Performed by: FAMILY MEDICINE

## 2021-05-05 PROCEDURE — 81001 URINALYSIS AUTO W/SCOPE: CPT | Performed by: FAMILY MEDICINE

## 2021-05-05 RX ORDER — ONDANSETRON 4 MG/1
4 TABLET, ORALLY DISINTEGRATING ORAL EVERY 6 HOURS PRN
Qty: 10 TABLET | Refills: 0 | Status: SHIPPED | OUTPATIENT
Start: 2021-05-05 | End: 2021-05-08

## 2021-05-05 RX ORDER — ONDANSETRON 2 MG/ML
4 INJECTION INTRAMUSCULAR; INTRAVENOUS EVERY 30 MIN PRN
Status: DISCONTINUED | OUTPATIENT
Start: 2021-05-05 | End: 2021-05-05 | Stop reason: HOSPADM

## 2021-05-05 RX ORDER — HYDROMORPHONE HYDROCHLORIDE 1 MG/ML
0.5 INJECTION, SOLUTION INTRAMUSCULAR; INTRAVENOUS; SUBCUTANEOUS
Status: DISCONTINUED | OUTPATIENT
Start: 2021-05-05 | End: 2021-05-05 | Stop reason: HOSPADM

## 2021-05-05 RX ORDER — HYDROCODONE BITARTRATE AND ACETAMINOPHEN 5; 325 MG/1; MG/1
1 TABLET ORAL EVERY 6 HOURS PRN
Qty: 10 TABLET | Refills: 0 | Status: SHIPPED | OUTPATIENT
Start: 2021-05-05 | End: 2021-05-08

## 2021-05-05 RX ORDER — IOPAMIDOL 755 MG/ML
500 INJECTION, SOLUTION INTRAVASCULAR ONCE
Status: COMPLETED | OUTPATIENT
Start: 2021-05-05 | End: 2021-05-05

## 2021-05-05 RX ORDER — DIPHENHYDRAMINE HYDROCHLORIDE 50 MG/ML
50 INJECTION INTRAMUSCULAR; INTRAVENOUS ONCE
Status: COMPLETED | OUTPATIENT
Start: 2021-05-05 | End: 2021-05-05

## 2021-05-05 RX ORDER — CEPHALEXIN 500 MG/1
500 CAPSULE ORAL 2 TIMES DAILY
Qty: 20 CAPSULE | Refills: 0 | Status: SHIPPED | OUTPATIENT
Start: 2021-05-05 | End: 2021-05-19

## 2021-05-05 RX ORDER — HYDROMORPHONE HYDROCHLORIDE 1 MG/ML
0.5 INJECTION, SOLUTION INTRAMUSCULAR; INTRAVENOUS; SUBCUTANEOUS ONCE
Status: COMPLETED | OUTPATIENT
Start: 2021-05-05 | End: 2021-05-05

## 2021-05-05 RX ADMIN — SODIUM CHLORIDE 60 ML: 9 INJECTION, SOLUTION INTRAVENOUS at 17:00

## 2021-05-05 RX ADMIN — IOPAMIDOL 95 ML: 755 INJECTION, SOLUTION INTRAVENOUS at 17:01

## 2021-05-05 RX ADMIN — ONDANSETRON 4 MG: 2 INJECTION INTRAMUSCULAR; INTRAVENOUS at 16:03

## 2021-05-05 RX ADMIN — HYDROMORPHONE HYDROCHLORIDE 0.5 MG: 1 INJECTION, SOLUTION INTRAMUSCULAR; INTRAVENOUS; SUBCUTANEOUS at 16:02

## 2021-05-05 RX ADMIN — ONDANSETRON 4 MG: 2 INJECTION INTRAMUSCULAR; INTRAVENOUS at 16:34

## 2021-05-05 RX ADMIN — DIPHENHYDRAMINE HYDROCHLORIDE 50 MG: 50 INJECTION, SOLUTION INTRAMUSCULAR; INTRAVENOUS at 16:01

## 2021-05-05 RX ADMIN — SODIUM CHLORIDE 1000 ML: 9 INJECTION, SOLUTION INTRAVENOUS at 15:51

## 2021-05-05 RX ADMIN — HYDROMORPHONE HYDROCHLORIDE 0.5 MG: 1 INJECTION, SOLUTION INTRAMUSCULAR; INTRAVENOUS; SUBCUTANEOUS at 16:34

## 2021-05-05 RX ADMIN — HYDROMORPHONE HYDROCHLORIDE 0.5 MG: 1 INJECTION, SOLUTION INTRAMUSCULAR; INTRAVENOUS; SUBCUTANEOUS at 17:44

## 2021-05-05 NOTE — TELEPHONE ENCOUNTER
Zach,     I called to discuss further with the patient. The patient describes having constant diarrhea. Last night she vomited. She has lost about 20 lbs. She use to be 202 lbs and is now down to 182 lbs. She is feeling slightly congested. No fever.     PLAN:   Huddle with provider. The patient is requesting the following.   1. She would like another antibiotic for her cough. Sent to the [facility].   2. She would like a refill on the Guaifenesin-codeine sent to the [facility]. She used her last refill on 04/27/21.   3. She would like to have stool samples taken again. Please place orders and this nurse will gather the correct supplies and place at .     Please route back to nurse.     Jose Finney RN, BSN  [place]  May 5, 2021

## 2021-05-05 NOTE — TELEPHONE ENCOUNTER
Jose, please talk with patient to see what is going on.     - Does she want a different antibiotic for the cough  - Should we do stool cultures to see if she has C.Diff     Zach Tomlinson-LOVE Moss  [facility] [place]

## 2021-05-05 NOTE — TELEPHONE ENCOUNTER
Dr. Mohr has availability in person on 8/9/2021, and virtual availability in July.  Patient contacted, states that she has been having persistent diarrhea for approximately one month.  Has noted some blood in the stool occasionally, but not regularly or at present.  Patient states that everything that goes in, comes right out and she has experienced significant weight loss of 10 pounds in the past month.  States that she also has been vomiting and has not been able to eat for 3 days.  Recommended small, frequent sips of water or gatorade, patient states that she will vomit any intake.  Reviewed s/s of dehydration - decreased urination (no urination in > 8-10 hours), dry lips and mouth, feeling lightheaded and unable to tolerate anything orally, would recommend ER visit.  Patient verbalizes understanding of recommendations.  Appointment rescheduled to telephone visit (cannot do video, needs telephone ASL relay) with Junaid Pritchett PA-C on Monday 5/17/2021 at 8 AM.      Telma Hansen RN

## 2021-05-05 NOTE — ED TRIAGE NOTES
Pt comes in with complaints of N/V/D. Pt has abd pain. Pt feels dehydrated. Pt states this has been going on for about 1 month.

## 2021-05-05 NOTE — TELEPHONE ENCOUNTER
I have sent Keflex - 1 tablet twice a day for 10 days. I can not send any further cough medication due to it being a controlled substance.    Stool culture orders placed     Zach Tomlinson-LOVE Moss  [facility]

## 2021-05-05 NOTE — TELEPHONE ENCOUNTER
"Patient calling in with an  and stating she is still not doing well. Cough, productive with yellowish to green colored phlegm. Ongoing watery diarrhea for almost a month. \"I am losing weight.\" Noted bruising to body, questioning if her iron is low again? GI appointment with [facility] is on 9/1/21. Patient asked for a return call. Ida Bradley LPN     "

## 2021-05-05 NOTE — DISCHARGE INSTRUCTIONS
You need to bring a stool sample back to the laboratory for testing.  Follow-up with your primary care provider next week.  Follow-up with gastroenterology as planned.  Return to the emergency department if you develop fever severe pain or intractable vomiting.

## 2021-05-05 NOTE — ED PROVIDER NOTES
History     Chief Complaint   Patient presents with     Abdominal Pain     HPI   History is obtained via  utilizing an iPad.  Katy Islas is a 31 year old female with congenital hearing loss/deafness and multiple complex medical problems who presents to the emergency department with a 1 month history of nausea vomiting and diarrhea.  Patient is also had a dry nonproductive cough.  She was on a Z-Nathan but did not improve.  She has been prescribed Keflex but has not picked up the prescription yet.  She has a history of C. difficile.  She does not feel this is severe as C. difficile.  She denies fever or chills.  She has crampy abdominal pain.  She has had little to eat or drink in the last 3 days and feels dehydrated.  She contacted her clinic and was directed to the emergency department.  Her primary care provider has ordered test on her stool including C. difficile toxin but the patient has not provided a sample yet.  Patient denies travel.  Patient feels she had COVID-19 back in January when she was quite ill.  Early on with this illness she had a lot of nausea vomiting and heartburn.  She was taken Mylanta 4 times a day.  She has been off the Mylanta for over a week now.  Diarrhea is frequent watery and yellowish in color.  No black or tarry stools.  No bright red blood per rectum.  No frequency or dysuria.  She feels dehydrated with symptoms of feeling lightheaded dizzy or weak with a very dry mouth.    Allergies:  Allergies   Allergen Reactions     Iohexol Hives     Other reaction(s): Hives  IV contrast; patient states she tolerates contrast if she gets benadryl before  IV contrast; patient states she tolerates contrast if she gets benadryl before     Baclofen      hives     Bees Hives, Swelling and Difficulty breathing     Contrast Dye      Hives,   Updated 5/10/2016 CT Contrast.     Iodine Hives     Metoclopramide      Other reaction(s): Tremors  LW Reaction: shaking/sweating      Midazolam      Other reaction(s): Agitation     Monosodium Glutamate      Morphine Other (See Comments)     Difficulty with urination     Nsaids Other (See Comments)     GI BLEED x2     Other (Do Not Use) Other (See Comments)     Xanaflex- pt becomes disoriented and loses bladder control     Reglan [Metoclopramide Hcl] Other (See Comments)     shaking     Soma [Carisoprodol] Visual Disturbance     Sleep walking     Tizanidine      Topamax Other (See Comments)     Topamax [Topiramate] Nausea     Tingling  GI/Vomit     Tramadol      Severe Headache, Seizure Risk     Tylenol W/Codeine [Acetaminophen-Codeine] Nausea and Itching     Tylenol 3     Versed Other (See Comments)     Zolmitriptan      Makes face feel like its twitching     Droperidol Anxiety     Flu Virus Vaccine Rash      Arm swelling       Problem List:    Patient Active Problem List    Diagnosis Date Noted     Borderline personality disorder (H) 04/27/2021     Priority: Medium     Lymphedema 09/10/2020     Priority: Medium     Patellofemoral pain syndrome of both knees 06/11/2020     Priority: Medium     Primary osteoarthritis of both knees 06/11/2020     Priority: Medium     Abscess of anal and rectal regions 04/27/2020     Priority: Medium     Added automatically from request for surgery [id number]       Rectal pain 04/19/2020     Priority: Medium     Abdominal pain 04/18/2020     Priority: Medium     Morbid obesity (H) 03/10/2020     Priority: Medium     Abnormal uterine bleeding 02/12/2020     Priority: Medium     Added automatically from request for surgery [id number]       Anal fissure 02/04/2020     Priority: Medium     Low hematocrit 02/04/2020     Priority: Medium     Elevated d-dimer 02/04/2020     Priority: Medium     Hypertension goal BP (blood pressure) < 130/80 08/05/2019     Priority: Medium     Bulge of cervical disc without myelopathy 04/24/2019     Priority: Medium     Cervicalgia 04/17/2019     Priority: Medium     Class 1 obesity due to excess  calories without serious comorbidity with body mass index (BMI) of 32.0 to 32.9 in adult 04/03/2019     Priority: Medium     Hypophosphatemia 11/01/2018     Priority: Medium     Patellar maltracking, right 09/05/2018     Priority: Medium     Right anterior knee pain 09/05/2018     Priority: Medium     Melanocytic nevus, unspecified location 07/23/2018     Priority: Medium     Dysplastic skin lesion 07/23/2018     Priority: Medium     Iliotibial band syndrome affecting lower leg, right 12/21/2017     Priority: Medium     Chondromalacia of right patellofemoral joint 12/21/2017     Priority: Medium     Upper GI bleed - suspected 07/01/2017     Priority: Medium     Tobacco use disorder 07/01/2017     Priority: Medium     History of pseudoseizure 07/01/2017     Priority: Medium     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) 03/10/2017     Priority: Medium     Lumbar disc disease with radiculopathy 02/23/2017     Priority: Medium     S/P lumbar fusion 02/23/2017     Priority: Medium     Dr. YOVANI Millan, 2/23/17       Vitamin D deficiency 01/06/2017     Priority: Medium     Atypical mole 01/06/2017     Priority: Medium     Mild persistent asthma without complication 12/02/2016     Priority: Medium     Chronic bilateral low back pain with left-sided sciatica 12/02/2016     Priority: Medium     Bee sting allergy 09/16/2016     Priority: Medium     C. difficile colitis 08/26/2016     Priority: Medium     Gastroesophageal reflux disease without esophagitis 08/26/2016     Priority: Medium     Herpes simplex virus infection 11/12/2015     Priority: Medium     Adjustment disorder with mixed anxiety and depressed mood 10/14/2015     Priority: Medium     Bipolar disorder (H) 01/31/2013     Priority: Medium     Chronic pain 02/09/2012     Priority: Medium     Patient is followed by Aura Rosario PA-C for ongoing prescription of pain medication.  All refills should only be approved by this provider, or  covering partner.    Medication(s): Norco    Maximum quantity per month: 70  Clinic visit frequency required: Q 2 months     Controlled substance agreement:   Discussed and signed 4/25/17    Encounter-Level CSA - 01/12/2015:                 Controlled Substance Agreement - Scan on 1/26/2015  9:14 AM : Controlled Medication Agreement-01/12/15 (below)            Pain Clinic evaluation in the past: Yes       Date/Location:   [facility], fall 2016    DIRE Total Score(s):    4/25/2017   Total Score 17       Last [facility] website verification:  done on 4/25/17 by LOVE Maki   [email or web address]           JULIETH (generalized anxiety disorder) 09/22/2011     Priority: Medium     Mild major depression (H) 08/29/2011     Priority: Medium     Crohn's colitis (H) 08/04/2009     Priority: Medium     Dysmenorrhea 05/11/2007     Priority: Medium     Benign neoplasm of skin of lower limb, including hip 03/16/2004     Priority: Medium     Migraine      Priority: Medium     Dr. Farrar - Neurology.  Now on Inderal.  Seems to be working.  Follow-up 9/08  Problem list name updated by automated process. Provider to review       Hearing loss      Priority: Medium     Congenital  Problem list name updated by automated process. Provider to review       Allergic rhinitis      Priority: Medium     Problem list name updated by automated process. Provider to review          Past Medical History:    Past Medical History:   Diagnosis Date     Abdominal pain 10/31- 11/4/2005     Allergic rhinitis, cause unspecified      Arthritis      C. 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, without mention of intractable epilepsy       Migraine 07/21/12     Migraine, unspecified, without mention of intractable migraine without mention of status migrainosus      Mild intermittent asthma      Mycoplasma infection in conditions classified elsewhere and of unspecified site      Other chronic pain      Renal disease      Unspecified hearing loss        Past Surgical History:    Past Surgical History:   Procedure Laterality Date     AS REMOVAL OF COCCYX  10/18/2017     C FUSION OF SACROILIAC JOINT  10/26/2018     COLONOSCOPY  7/1/2009    [facility].     COLONOSCOPY N/A 7/17/2019    Procedure: Colonoscopy, With Polypectomy And Biopsy;  Surgeon: Edwin Conde MD;  Location: [facility] OR     COLONOSCOPY WITH CO2 INSUFFLATION N/A 7/17/2019    Procedure: COLONOSCOPY, WITH CO2 INSUFFLATION;  Surgeon: Edwin Conde MD;  Location: [facility] OR     COMBINED ESOPHAGOSCOPY, GASTROSCOPY, DUODENOSCOPY (EGD) WITH CO2 INSUFFLATION N/A 4/12/2019    Procedure: COMBINED ESOPHAGOSCOPY, GASTROSCOPY, DUODENOSCOPY (EGD) WITH CO2 INSUFFLATION;  Surgeon: Edwin Conde MD;  Location: [facility] OR     ESOPHAGOSCOPY, GASTROSCOPY, DUODENOSCOPY (EGD), COMBINED N/A 4/12/2019    Procedure: Combined Esophagoscopy, Gastroscopy, Duodenoscopy (Egd), Biopsy Single Or Multiple;  Surgeon: Edwin Conde MD;  Location: [facility] OR     FISTULOTOMY RECTUM N/A 4/30/2020    Procedure: EXAM UNDER ANESTHESIA, ANUS, parital fistulotomy;  Surgeon: Valentin Sanchez MD;  Location: [facility] OR     FUSION SPINE POSTERIOR MINIMALLY INVASIVE ONE LEVEL N/A 2/23/2017    Procedure: FUSION SPINE POSTERIOR MINIMALLY INVASIVE ONE LEVEL;  L4-5 Oblique Lateral Lumbar Interbody Fusion   Epidural steroid injection.   Transpedicular Bone marrow aspiration;  Surgeon: Jeniffer Eugene MD;  Location: [facility] OR     HC COLONOSCOPY THRU STOMA WITH BIOPSY  10/29/2003    Impression is that of normal appearing colonoscopy, without evidence of rectal bleeding.     HC COLONOSCOPY  THRU STOMA, DIAGNOSTIC  10/00    normal     HC COLONOSCOPY THRU STOMA, DIAGNOSTIC  Oct 2009    Dr López- normal     HC EEG AWAKE AND SLEEP      abnormal     HC MRI BRAIN W/O CONTRAST  12/00    normal     HC REMOVAL GALLBLADDER  8/5/2009    [facility], [facility].      UGI ENDOSCOPY DIAG W BIOPSY  11/11/09    Normal esophagus     ORTHOPEDIC SURGERY  October 19,2011    diskectomy L4-L5     [facility] UGI ENDOSCOPY, SIMPLE EXAM  7/00, 10/00    mild chronic esophagitis and duodenitis, neg H pylori     [facility] UGI ENDOSCOPY, SIMPLE EXAM  01/20/2005    Esophagogastroduodenoscopy, colonoscopy with biopsies.  [facility] UGI ENDOSCOPY, SIMPLE EXAM  7/1/2009    [facility], [facility].     [facility] UGI ENDOSCOPY, SIMPLE EXAM  11/11/2009    attempted upper GI, pt. could not tolerate procedure:[facility]       Family History:    Family History   Problem Relation Age of Onset     Gastrointestinal Disease Brother         severe Crohn's     Neurologic Disorder Brother         Seizures post head injury     Depression Brother      Substance Abuse Brother      Genitourinary Problems Father         kidney stones     Diabetes Father      Heart Disease Father         Open heart surgery     Breast Cancer Maternal Grandmother      Parkinsonism Maternal Grandmother      Cerebrovascular Disease Paternal Grandmother      Cancer Maternal Grandfather         Lung     Cardiovascular Paternal Grandfather         Heart Attack     Substance Abuse Mother         in recovery x1 year      Lung Cancer Paternal Uncle      Cancer Paternal Uncle      Lung Cancer Maternal Uncle      Colon Cancer Maternal Uncle        Social History:  Marital Status:  Single [1]  Social History     Tobacco Use     Smoking status: Current Every Day Smoker     Packs/day: 0.25     Years: 5.00     Pack years: 1.25     Types: Cigarettes     Smokeless tobacco: Never Used   Substance Use Topics     Alcohol use: Not Currently      Comment: Not since last July 2018     Drug use: Not Currently     Types: Marijuana     Comment: Medical Marijuana currently-- More CBD        Medications:    albuterol (PROAIR HFA/PROVENTIL HFA/VENTOLIN HFA) 108 (90 Base) MCG/ACT inhaler  albuterol (PROVENTIL) (2.5 MG/3ML) 0.083% neb solution  alum & mag hydroxide-simethicone (MAALOX ADVANCED MAX ST) 400-400-40 MG/5ML SUSP suspension  ARIPiprazole (ABILIFY) 30 MG tablet  buPROPion (WELLBUTRIN XL) 300 MG 24 hr tablet  busPIRone (BUSPAR) 15 MG tablet  cephALEXin (KEFLEX) 500 MG capsule  clonazePAM (KLONOPIN) 1 MG tablet  cyclobenzaprine (FLEXERIL) 10 MG tablet  dicyclomine (BENTYL) 20 MG tablet  diphenhydrAMINE (BENADRYL) 50 MG capsule  DULoxetine (CYMBALTA) 60 MG EC capsule  gabapentin (NEURONTIN) 300 MG capsule  guaiFENesin-codeine (ROBITUSSIN AC) 100-10 MG/5ML solution  HYDROcodone-acetaminophen (NORCO) 5-325 MG tablet  hyoscyamine (LEVSIN) 0.125 MG tablet  lamoTRIgine (LAMICTAL) 25 MG tablet  medical cannabis (Patient's own supply)  metoprolol succinate ER (TOPROL-XL) 50 MG 24 hr tablet  Multiple Vitamins-Minerals (EQ MULTIVITAMINS ADULT GUMMY) CHEW  naproxen (NAPROSYN) 500 MG tablet  ondansetron (ZOFRAN ODT) 4 MG ODT tab  ondansetron (ZOFRAN ODT) 4 MG ODT tab  pantoprazole (PROTONIX) 40 MG EC tablet  aluminum chloride (DRYSOL) 20 % external solution  azelastine (OPTIVAR) 0.05 % ophthalmic solution  azithromycin (ZITHROMAX) 250 MG tablet  buprenorphine (SUBUTEX) 2 MG SUBL sublingual tablet  diclofenac (VOLTAREN) 1 % topical gel  diltiazem 2% in PLO gel  EPINEPHrine (ANY BX GENERIC EQUIV) 0.3 MG/0.3ML injection 2-pack  furosemide (LASIX) 20 MG tablet  lidocaine (XYLOCAINE) 2 % solution  medroxyPROGESTERone (DEPO-PROVERA) 150 MG/ML IM injection  methocarbamol (ROBAXIN) 750 MG tablet  methylPREDNISolone (MEDROL DOSEPAK) 4 MG tablet therapy pack  NONFORMULARY  oxyCODONE-acetaminophen (PERCOCET) 7.5-325 MG per tablet  penicillin V (VEETID) 500 MG  tablet  prochlorperazine (COMPAZINE) 10 MG tablet  rizatriptan (MAXALT-MLT) 5 MG ODT  valACYclovir (VALTREX) 1000 mg tablet          Review of Systems   All other systems reviewed and are negative.      Physical Exam   BP: (!) 156/121  Pulse: 123  Temp: 98.4  F (36.9  C)  Resp: 16  Weight: 86.9 kg (191 lb 8 oz)  SpO2: 97 %      Physical Exam  Vitals signs and nursing note reviewed.   Constitutional:       Appearance: She is well-developed.   HENT:      Head: Normocephalic and atraumatic.      Mouth/Throat:      Mouth: Mucous membranes are moist.      Pharynx: Oropharynx is clear.   Eyes:      Extraocular Movements: Extraocular movements intact.   Cardiovascular:      Rate and Rhythm: Normal rate and regular rhythm.      Heart sounds: Normal heart sounds.   Pulmonary:      Effort: Pulmonary effort is normal.   Abdominal:      General: Abdomen is flat. Bowel sounds are normal.      Palpations: Abdomen is soft.      Tenderness: There is no abdominal tenderness.      Hernia: No hernia is present.      Comments: Very benign abdominal exam.  Normal active bowel sounds.  No localized tenderness.  No peritoneal signs of rebound or guarding.   Skin:     General: Skin is warm and dry.      Capillary Refill: Capillary refill takes less than 2 seconds.   Neurological:      General: No focal deficit present.      Mental Status: She is alert.         ED Course        Procedures               Critical Care time:  none               Results for orders placed or performed during the hospital encounter of 05/05/21 (from the past 24 hour(s))   CBC with platelets differential   Result Value Ref Range    WBC 5.4 4.0 - 11.0 10e9/L    RBC Count 4.27 3.8 - 5.2 10e12/L    Hemoglobin 14.5 11.7 - 15.7 g/dL    Hematocrit 41.5 35.0 - 47.0 %    MCV 97 78 - 100 fl    MCH 34.0 (H) 26.5 - 33.0 pg    MCHC 34.9 31.5 - 36.5 g/dL    RDW 12.4 10.0 - 15.0 %    Platelet Count 262 150 - 450 10e9/L    Diff Method Automated Method     % Neutrophils 67.0 %     % Lymphocytes 23.6 %    % Monocytes 8.4 %    % Eosinophils 0.2 %    % Basophils 0.4 %    % Immature Granulocytes 0.4 %    Nucleated RBCs 0 0 /100    Absolute Neutrophil 3.6 1.6 - 8.3 10e9/L    Absolute Lymphocytes 1.3 0.8 - 5.3 10e9/L    Absolute Monocytes 0.5 0.0 - 1.3 10e9/L    Absolute Eosinophils 0.0 0.0 - 0.7 10e9/L    Absolute Basophils 0.0 0.0 - 0.2 10e9/L    Abs Immature Granulocytes 0.0 0 - 0.4 10e9/L    Absolute Nucleated RBC 0.0    Comprehensive metabolic panel   Result Value Ref Range    Sodium 140 133 - 144 mmol/L    Potassium 3.8 3.4 - 5.3 mmol/L    Chloride 109 94 - 109 mmol/L    Carbon Dioxide 28 20 - 32 mmol/L    Anion Gap 3 3 - 14 mmol/L    Glucose 87 70 - 99 mg/dL    Urea Nitrogen 10 7 - 30 mg/dL    Creatinine 0.83 0.52 - 1.04 mg/dL    GFR Estimate >90 >60 mL/min/[1.73_m2]    GFR Estimate If Black >90 >60 mL/min/[1.73_m2]    Calcium 9.2 8.5 - 10.1 mg/dL    Bilirubin Total 0.4 0.2 - 1.3 mg/dL    Albumin 4.3 3.4 - 5.0 g/dL    Protein Total 7.7 6.8 - 8.8 g/dL    Alkaline Phosphatase 97 40 - 150 U/L    ALT 94 (H) 0 - 50 U/L    AST 37 0 - 45 U/L   Lipase   Result Value Ref Range    Lipase 49 (L) 73 - 393 U/L   Lactic acid whole blood   Result Value Ref Range    Lactic Acid 1.1 0.7 - 2.0 mmol/L   CT Abdomen Pelvis w Contrast    Narrative    CT ABDOMEN AND PELVIS WITH CONTRAST 5/5/2021 5:15 PM    CLINICAL HISTORY: Abdominal pain, acute, nonlocalized    TECHNIQUE: CT scan of the abdomen and pelvis was performed following  injection of IV contrast. Multiplanar reformats were obtained. Dose  reduction techniques were used.  CONTRAST: 95mL, Isovue-370    COMPARISON: CT abdomen and pelvis 9/21/2020.    FINDINGS:   LOWER CHEST: Normal.    HEPATOBILIARY: Cholecystectomy. Liver is unremarkable.    PANCREAS: Normal.    SPLEEN: Normal.    ADRENAL GLANDS: Normal.    KIDNEYS/BLADDER: Indeterminate subcentimeter lower pole renal cortical  lesions are present on series 3, images 119 and 123. These do not  appear to  reflect simple cysts. Early renal cell carcinoma must be  excluded. Left kidney is unremarkable. No hydronephrosis or urinary  tract calculi. Bladder is unremarkable.    BOWEL: No bowel obstruction, diverticulitis or appendicitis.    LYMPH NODES: No enlarged abdominal or pelvic lymph nodes.    VASCULATURE: Unremarkable.    PELVIC ORGANS: Uterus and ovaries are unremarkable. No significant  free pelvic fluid. Rectum is unremarkable.    ADDITIONAL FINDINGS: None.    MUSCULOSKELETAL: Lower lumbar fusion hardware and left SI joint fusion  hardware is noted. Battery pack right flank with stimulator leads in  the spinal canal lower thoracic region again noted. No destructive  bone lesions.      Impression    IMPRESSION:   1.  No acute changes in the abdomen or pelvis to account for patient's  symptoms. No bowel obstruction, diverticulitis or appendicitis.    2.  Indeterminate subcentimeter lower pole right renal lesions.  Follow-up MRI of the kidneys nonemergently suggested as these do not  appear to reflect simple cysts. Early renal cell carcinoma must be  excluded.    3.  Prior cholecystectomy.    [place] MD   UA with Microscopic   Result Value Ref Range    Color Urine Yellow     Appearance Urine Slightly Cloudy     Glucose Urine Negative NEG^Negative mg/dL    Bilirubin Urine Negative NEG^Negative    Ketones Urine Negative NEG^Negative mg/dL    Specific Gravity Urine >1.035 (H) 1.003 - 1.035    Blood Urine Negative NEG^Negative    pH Urine 6.0 5.0 - 7.0 pH    Protein Albumin Urine Negative NEG^Negative mg/dL    Urobilinogen mg/dL 0.0 0.0 - 2.0 mg/dL    Nitrite Urine Negative NEG^Negative    Leukocyte Esterase Urine Negative NEG^Negative    Source Midstream Urine     WBC Urine 3 0 - 5 /HPF    RBC Urine 2 0 - 2 /HPF    Bacteria Urine Moderate (A) NEG^Negative /HPF    Squamous Epithelial /HPF Urine 5 (H) 0 - 1 /HPF    Mucous Urine Present (A) NEG^Negative /LPF     *Note: Due to a large number of results and/or  encounters for the requested time period, some results have not been displayed. A complete set of results can be found in Results Review.       Medications   ondansetron (ZOFRAN) injection 4 mg (4 mg Intravenous Given 5/5/21 1634)   HYDROmorphone (PF) (DILAUDID) injection 0.5 mg (0.5 mg Intravenous Given 5/5/21 1744)   0.9% sodium chloride BOLUS (0 mLs Intravenous Stopped 5/5/21 1716)   diphenhydrAMINE (BENADRYL) injection 50 mg (50 mg Intravenous Given 5/5/21 1601)   HYDROmorphone (PF) (DILAUDID) injection 0.5 mg (0.5 mg Intravenous Given 5/5/21 1602)   Saline 100mL Bag (60 mLs Intravenous Given 5/5/21 1700)   iopamidol (ISOVUE-370) solution 500 mL (95 mLs Intravenous Given 5/5/21 1701)   sodium chloride (PF) 0.9% PF flush 3 mL (10 mLs Intravenous Given 5/5/21 1659)       Assessments & Plan (with Medical Decision Making)   MDM--31-year-old female with congenital deafness and history of multiple complex medical problems who presents with a 1 month history of nausea vomiting and diarrhea.  She has a history of C. difficile and has recently been on antibiotics.  Stool studies have been ordered by her primary care physician but she has not dropped off a sample yet.  Stool studies were ordered here but she had no diarrhea while she was in the ED.  Patient was given IV fluids and parenteral nausea and pain control with good relief.  Her work-up showed normal labs, a benign abdominal exam and a normal CT scan.  Patient has a history of C. difficile and was recently on a Z-Nathan and was scheduled to start Keflex today.  With her ongoing diarrhea I recommended not starting the Keflex for upper respiratory symptoms.  I do not see any indication for antibiotic at this time.  I do recommend she follow through with the stool studies as previously ordered.  She is also scheduled to see gastroenterology in 2 weeks and I recommend she follow-up with that even if her symptoms resolve.  Her history and review of all of her findings  were discussed through an .  The patient is comfortable with this evaluation treatment and discharge plan and all her questions were answered to her satisfaction.  She was discharged in improved condition.  I have reviewed the nursing notes.    I have reviewed the findings, diagnosis, plan and need for follow up with the patient.       Discharge Medication List as of 5/5/2021  6:07 PM      START taking these medications    Details   HYDROcodone-acetaminophen (NORCO) 5-325 MG tablet Take 1 tablet by mouth every 6 hours as needed for severe pain, Disp-10 tablet, R-0, Local Print             Final diagnoses:   Abdominal pain, epigastric   Vomiting and diarrhea       5/5/2021   [facility] EMERGENCY DEPT     Prosper, Shiv COWAN MD  05/05/21 2019

## 2021-05-05 NOTE — ED NOTES
Pt advised by writer that ED was busy. Writer asked if she would like me to keep the  on the line or if we can call them back when needed. Pt ok with hanging up with  and calling back when more assessments are needed.

## 2021-05-05 NOTE — TELEPHONE ENCOUNTER
Attempted to reach the patient with the following information.  Left message for patient to return call to clinic.     Nuria THURMAN CMA

## 2021-05-06 ENCOUNTER — TELEPHONE (OUTPATIENT)
Dept: FAMILY MEDICINE | Facility: OTHER | Age: 31
End: 2021-05-06

## 2021-05-06 ENCOUNTER — PATIENT OUTREACH (OUTPATIENT)
Dept: CARE COORDINATION | Facility: CLINIC | Age: 31
End: 2021-05-06

## 2021-05-06 DIAGNOSIS — Z71.89 OTHER SPECIFIED COUNSELING: ICD-10-CM

## 2021-05-06 NOTE — TELEPHONE ENCOUNTER
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   Appointment made for Wednesday for MRI follow up.    EDYTA KuN, RN, PHN  [facility] ~ Lead Registered Nurse  Clinic Triage ~ [place] [facility] [place]  May 6, 2021

## 2021-05-06 NOTE — TELEPHONE ENCOUNTER
Patient informed.    Iggy Bernstein, EDYTAN, RN, PHN  [facility] ~ Lead Registered Nurse  Clinic Triage ~ [place] [facility] [place]  May 6, 2021

## 2021-05-06 NOTE — PROGRESS NOTES
Clinic Care Coordination Contact  [facility]/Voicemail       Clinical Data: Care Coordinator Outreach  Outreach attempted x 1.  Left message on patient's voicemail with call back information and requested return call.  Plan: Care Coordinator will try to reach patient again in 1-2 business days.

## 2021-05-07 NOTE — PROGRESS NOTES
Clinic Care Coordination Contact  Community Health Worker Initial Outreach    CHW Initial Information Gathering:  Referral Source: ED Follow-Up  Preferred Hospital: [facility]  [phone]  Preferred Urgent Care: [facility], [phone]       Patient accepts CC: No, due to the patient stating that she was not needing any assistance at this time. The only question she had was about the Health Care Directive and the CHW was able to point her into the right direction to be able to find it on the [facility] website. At this time the CHW will close the referral for CCC.

## 2021-05-07 NOTE — PROGRESS NOTES
Assessment/PLAN     ICD-10-CM    1. Dysuria  R30.0 *UA reflex to Microscopic and Culture ([facility] (except [place] and [place])     *UA reflex to Microscopic and Culture ([facility] (except [place] and [place])   2. Renal cyst  N28.1    3. Mild major depression (H)  F32.0 buPROPion (WELLBUTRIN XL) 300 MG 24 hr tablet   4. Bipolar disorder, current episode mixed, severe, without psychotic features (H)  F31.63 buPROPion (WELLBUTRIN XL) 300 MG 24 hr tablet   5. Adjustment disorder with mixed anxiety and depressed mood  F43.23 buPROPion (WELLBUTRIN XL) 300 MG 24 hr tablet   6. Neck pain  M54.2    7. Mild persistent asthma without complication  J45.30    8. Hypertension goal BP (blood pressure) < 130/80  I10        1. Dysuria   - No signs of infection on UA, reviewed this with patient and her mom   - Unlikely to have infection since still has a few days left of Keflex   - Continue to monitor     2. Renal Cyst  - [place], showed possible renal lesion      Recommendation from radiology   - Reviewed MRI from CT   - Will need repeat imaging in 1 year     3-5. Mood   - Continues to see counseling, still working on getting a psychiatrist  - Mood has been up and down, needs Wellbutrin refilled   - Reviewed use and side effects, refilled     6. Neck pain   - Only 2 days, does follow with [facility] and has upcoming appointment with them to discuss  - Monitor     7. Asthma  - Stable on current regimen, reviewed use and side effects, refilled as needed     8. HTN   - Elevated today but hasn't taken her medication in several weeks   - Advised I had sent refill a few days ago, needs to restart         Review of the result(s) of each unique test - See list       MRI 5/10/21 MR abdomen from outside of system       UA today   Diagnosis or treatment significantly limited by social determinants of health - Depression, tobacco use    30 minutes spent on the date of  the encounter doing chart review, history and exam, documentation and further activities as noted above    The patient indicates understanding of these issues and agrees with the plan.    Return in about 1 month (around 6/12/2021).    PA student as below was present and participated in shadow capacity only    EMILY Meza-S2  [facility]     Aura Rosario PA-C  [facility] TERRY Burns is a 31 year old who presents for the following health issues  accompanied by her mother:    History of Present Illness       CKD: including Norco, two times daily.    She eats 4 or more servings of fruits and vegetables daily.She consumes 0 sweetened beverage(s) daily.She exercises with enough effort to increase her heart rate 30 to 60 minutes per day.  She exercises with enough effort to increase her heart rate 3 or less days per week. She is missing 2 dose(s) of medications per week.    Genitourinary - Female  Onset/Duration: a few weeks  Description:   Painful urination (Dysuria): YES           Frequency: no  Blood in urine (Hematuria): no - urine is darker than normal  Delay in urine (Hesitency): no  Intensity: moderate  Progression of Symptoms:  worsening  Accompanying Signs & Symptoms:  Fever/chills: YES- chills  Flank pain: YES  Nausea and vomiting: YES  Vaginal symptoms: none  Abdominal/Pelvic Pain: YES  History:   History of frequent UTI s: YES  History of kidney stones: YES  Sexually Active: no  Possibility of pregnancy: No  Therapies tried and outcome: Increase fluid intake    - Still coughing a little bit      But improving with antibiotics     - Results from MRI abdomen done at [facility] on 5/10/21     - Neck pain     Just started yesterday     Apt with [facility] May 25th      Deep blue      Had psoriasis back of neck, 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      Objective    BP (!) 144/96   Pulse 115   Temp 97.8  F (36.6  C) (Temporal)   Wt 85.3 kg (188 lb)   LMP  (LMP Unknown)   SpO2 98%   BMI 34.39 kg/m    Body mass index is 34.39 kg/m .  Physical Exam   GENERAL APPEARANCE: healthy, alert and no distress  EYES: Eyes grossly normal to inspection, PERRLA, conjunctivae and sclerae without injection or discharge, EOM intact   RESP: Lungs clear to auscultation - no rales, rhonchi or wheezes    CV: Regular rates and rhythm, normal S1 S2, no S3 or S4, no murmur, click or rub, no peripheral edema and peripheral pulses strong and symmetric bilaterally   MS: No musculoskeletal defects are noted and gait is age appropriate without ataxia   SKIN: No suspicious lesions or rashes, hydration status appears adeuqate with normal skin turgor   PSYCH: Alert and oriented x3; speech- coherent , normal rate and volume; able to articulate logical thoughts, able to abstract reason, no tangential thoughts, no hallucinations or delusions, mentation appears normal, Mood is euthymic. Affect is appropriate for this mood state and bright. Thought content is free of suicidal ideation, hallucinations, and delusions. Dress is adequate and upkept. Eye contact is good during conversation.       Diagnostics  Reviewed in Epic

## 2021-05-08 ENCOUNTER — HEALTH MAINTENANCE LETTER (OUTPATIENT)
Age: 31
End: 2021-05-08

## 2021-05-08 ENCOUNTER — MYC MEDICAL ADVICE (OUTPATIENT)
Dept: FAMILY MEDICINE | Facility: OTHER | Age: 31
End: 2021-05-08

## 2021-05-08 DIAGNOSIS — I10 HYPERTENSION GOAL BP (BLOOD PRESSURE) < 130/80: ICD-10-CM

## 2021-05-10 ENCOUNTER — TRANSFERRED RECORDS (OUTPATIENT)
Dept: HEALTH INFORMATION MANAGEMENT | Facility: CLINIC | Age: 31
End: 2021-05-10

## 2021-05-10 RX ORDER — METOPROLOL SUCCINATE 50 MG/1
50 TABLET, EXTENDED RELEASE ORAL DAILY
Qty: 90 TABLET | Refills: 3 | Status: SHIPPED | OUTPATIENT
Start: 2021-05-10 | End: 2021-07-26

## 2021-05-11 NOTE — TELEPHONE ENCOUNTER
Patient is scheduled.   Anisha Beebe MA     Tazorac Counseling:  Patient advised that medication is irritating and drying.  Patient may need to apply sparingly and wash off after an hour before eventually leaving it on overnight.  The patient verbalized understanding of the proper use and possible adverse effects of tazorac.  All of the patient's questions and concerns were addressed.

## 2021-05-12 ENCOUNTER — MYC MEDICAL ADVICE (OUTPATIENT)
Dept: FAMILY MEDICINE | Facility: OTHER | Age: 31
End: 2021-05-12

## 2021-05-12 ENCOUNTER — TELEPHONE (OUTPATIENT)
Dept: FAMILY MEDICINE | Facility: OTHER | Age: 31
End: 2021-05-12

## 2021-05-12 ENCOUNTER — OFFICE VISIT (OUTPATIENT)
Dept: FAMILY MEDICINE | Facility: OTHER | Age: 31
End: 2021-05-12
Payer: MEDICARE

## 2021-05-12 VITALS
OXYGEN SATURATION: 98 % | SYSTOLIC BLOOD PRESSURE: 144 MMHG | DIASTOLIC BLOOD PRESSURE: 96 MMHG | TEMPERATURE: 97.8 F | HEART RATE: 115 BPM | WEIGHT: 188 LBS | BODY MASS INDEX: 34.39 KG/M2

## 2021-05-12 DIAGNOSIS — N28.1 RENAL CYST: ICD-10-CM

## 2021-05-12 DIAGNOSIS — I10 HYPERTENSION GOAL BP (BLOOD PRESSURE) < 130/80: ICD-10-CM

## 2021-05-12 DIAGNOSIS — J45.30 MILD PERSISTENT ASTHMA WITHOUT COMPLICATION: ICD-10-CM

## 2021-05-12 DIAGNOSIS — F43.23 ADJUSTMENT DISORDER WITH MIXED ANXIETY AND DEPRESSED MOOD: ICD-10-CM

## 2021-05-12 DIAGNOSIS — F32.0 MILD MAJOR DEPRESSION (H): ICD-10-CM

## 2021-05-12 DIAGNOSIS — M54.2 NECK PAIN: ICD-10-CM

## 2021-05-12 DIAGNOSIS — F31.63 BIPOLAR DISORDER, CURRENT EPISODE MIXED, SEVERE, WITHOUT PSYCHOTIC FEATURES (H): ICD-10-CM

## 2021-05-12 DIAGNOSIS — R30.0 DYSURIA: Primary | ICD-10-CM

## 2021-05-12 LAB
ALBUMIN UR-MCNC: NEGATIVE MG/DL
APPEARANCE UR: CLEAR
BILIRUB UR QL STRIP: NEGATIVE
COLOR UR AUTO: YELLOW
GLUCOSE UR STRIP-MCNC: NEGATIVE MG/DL
HGB UR QL STRIP: NEGATIVE
KETONES UR STRIP-MCNC: NEGATIVE MG/DL
LEUKOCYTE ESTERASE UR QL STRIP: NEGATIVE
NITRATE UR QL: NEGATIVE
PH UR STRIP: 5.5 PH (ref 5–7)
SOURCE: NORMAL
SP GR UR STRIP: 1.02 (ref 1–1.03)
UROBILINOGEN UR STRIP-ACNC: 0.2 EU/DL (ref 0.2–1)

## 2021-05-12 PROCEDURE — 81003 URINALYSIS AUTO W/O SCOPE: CPT | Performed by: PHYSICIAN ASSISTANT

## 2021-05-12 PROCEDURE — 99214 OFFICE O/P EST MOD 30 MIN: CPT | Performed by: PHYSICIAN ASSISTANT

## 2021-05-12 RX ORDER — BUPROPION HYDROCHLORIDE 300 MG/1
300 TABLET ORAL EVERY MORNING
Qty: 90 TABLET | Refills: 3 | Status: SHIPPED | OUTPATIENT
Start: 2021-05-12 | End: 2021-07-26

## 2021-05-12 ASSESSMENT — ANXIETY QUESTIONNAIRES
5. BEING SO RESTLESS THAT IT IS HARD TO SIT STILL: NOT AT ALL
4. TROUBLE RELAXING: SEVERAL DAYS
GAD7 TOTAL SCORE: 9
2. NOT BEING ABLE TO STOP OR CONTROL WORRYING: NEARLY EVERY DAY
1. FEELING NERVOUS, ANXIOUS, OR ON EDGE: NEARLY EVERY DAY
7. FEELING AFRAID AS IF SOMETHING AWFUL MIGHT HAPPEN: NOT AT ALL
GAD7 TOTAL SCORE: 9
6. BECOMING EASILY ANNOYED OR IRRITABLE: SEVERAL DAYS
3. WORRYING TOO MUCH ABOUT DIFFERENT THINGS: SEVERAL DAYS
GAD7 TOTAL SCORE: 9
7. FEELING AFRAID AS IF SOMETHING AWFUL MIGHT HAPPEN: NOT AT ALL

## 2021-05-12 NOTE — TELEPHONE ENCOUNTER
LM for the patient to return call.   Patient needs to be rescheduled per provider to tomorrow for a 60 minute phone visit. We also need to find out where her MRI done and get those records for tomorrow's visit.     Jose Finney RN, BSN  [place]  May 12, 2021

## 2021-05-12 NOTE — PATIENT INSTRUCTIONS
[facility]  will call you to coordinate your care as prescribed by your provider. A representative will call you within 1 business day to help schedule your appointment, or you may contact the  Representative at     [phone].

## 2021-05-13 ENCOUNTER — TELEPHONE (OUTPATIENT)
Dept: BEHAVIORAL HEALTH | Facility: CLINIC | Age: 31
End: 2021-05-13

## 2021-05-13 ENCOUNTER — NURSE TRIAGE (OUTPATIENT)
Dept: FAMILY MEDICINE | Facility: OTHER | Age: 31
End: 2021-05-13

## 2021-05-13 ENCOUNTER — MYC MEDICAL ADVICE (OUTPATIENT)
Dept: FAMILY MEDICINE | Facility: OTHER | Age: 31
End: 2021-05-13

## 2021-05-13 ENCOUNTER — APPOINTMENT (OUTPATIENT)
Dept: GENERAL RADIOLOGY | Facility: CLINIC | Age: 31
End: 2021-05-13
Attending: NURSE PRACTITIONER
Payer: MEDICARE

## 2021-05-13 ENCOUNTER — HOSPITAL ENCOUNTER (EMERGENCY)
Facility: CLINIC | Age: 31
Discharge: PSYCHIATRIC OR SUBSTANCE USE FACILITY | End: 2021-05-14
Attending: NURSE PRACTITIONER | Admitting: NURSE PRACTITIONER
Payer: MEDICARE

## 2021-05-13 ENCOUNTER — TELEPHONE (OUTPATIENT)
Dept: FAMILY MEDICINE | Facility: OTHER | Age: 31
End: 2021-05-13

## 2021-05-13 VITALS
BODY MASS INDEX: 34.39 KG/M2 | SYSTOLIC BLOOD PRESSURE: 94 MMHG | HEART RATE: 98 BPM | WEIGHT: 188 LBS | TEMPERATURE: 98.6 F | DIASTOLIC BLOOD PRESSURE: 60 MMHG | RESPIRATION RATE: 16 BRPM | OXYGEN SATURATION: 92 %

## 2021-05-13 DIAGNOSIS — R44.3 HALLUCINATIONS: ICD-10-CM

## 2021-05-13 DIAGNOSIS — R45.1 AGITATION: ICD-10-CM

## 2021-05-13 DIAGNOSIS — R45.851 SUICIDAL IDEATION: ICD-10-CM

## 2021-05-13 LAB
ANION GAP SERPL CALCULATED.3IONS-SCNC: 3 MMOL/L (ref 3–14)
APAP SERPL-MCNC: <2 MG/L (ref 10–20)
BASOPHILS # BLD AUTO: 0 10E9/L (ref 0–0.2)
BASOPHILS NFR BLD AUTO: 0.2 %
BUN SERPL-MCNC: 17 MG/DL (ref 7–30)
CALCIUM SERPL-MCNC: 9.2 MG/DL (ref 8.5–10.1)
CHLORIDE SERPL-SCNC: 105 MMOL/L (ref 94–109)
CO2 SERPL-SCNC: 30 MMOL/L (ref 20–32)
CREAT SERPL-MCNC: 0.76 MG/DL (ref 0.52–1.04)
DIFFERENTIAL METHOD BLD: ABNORMAL
EOSINOPHIL # BLD AUTO: 0 10E9/L (ref 0–0.7)
EOSINOPHIL NFR BLD AUTO: 0.1 %
ERYTHROCYTE [DISTWIDTH] IN BLOOD BY AUTOMATED COUNT: 12.3 % (ref 10–15)
GFR SERPL CREATININE-BSD FRML MDRD: >90 ML/MIN/{1.73_M2}
GLUCOSE SERPL-MCNC: 97 MG/DL (ref 70–99)
HCT VFR BLD AUTO: 39.5 % (ref 35–47)
HGB BLD-MCNC: 13.7 G/DL (ref 11.7–15.7)
IMM GRANULOCYTES # BLD: 0 10E9/L (ref 0–0.4)
IMM GRANULOCYTES NFR BLD: 0.2 %
LABORATORY COMMENT REPORT: NORMAL
LYMPHOCYTES # BLD AUTO: 1.4 10E9/L (ref 0.8–5.3)
LYMPHOCYTES NFR BLD AUTO: 14.8 %
MCH RBC QN AUTO: 34.3 PG (ref 26.5–33)
MCHC RBC AUTO-ENTMCNC: 34.7 G/DL (ref 31.5–36.5)
MCV RBC AUTO: 99 FL (ref 78–100)
MONOCYTES # BLD AUTO: 0.6 10E9/L (ref 0–1.3)
MONOCYTES NFR BLD AUTO: 6.9 %
NEUTROPHILS # BLD AUTO: 7.1 10E9/L (ref 1.6–8.3)
NEUTROPHILS NFR BLD AUTO: 77.8 %
NRBC # BLD AUTO: 0 10*3/UL
NRBC BLD AUTO-RTO: 0 /100
PLATELET # BLD AUTO: 267 10E9/L (ref 150–450)
POTASSIUM SERPL-SCNC: 4.1 MMOL/L (ref 3.4–5.3)
RBC # BLD AUTO: 4 10E12/L (ref 3.8–5.2)
SALICYLATES SERPL-MCNC: <2 MG/DL
SARS-COV-2 RNA RESP QL NAA+PROBE: NEGATIVE
SODIUM SERPL-SCNC: 138 MMOL/L (ref 133–144)
SPECIMEN SOURCE: NORMAL
WBC # BLD AUTO: 9.1 10E9/L (ref 4–11)

## 2021-05-13 PROCEDURE — 72100 X-RAY EXAM L-S SPINE 2/3 VWS: CPT

## 2021-05-13 PROCEDURE — 93010 ELECTROCARDIOGRAM REPORT: CPT | Performed by: NURSE PRACTITIONER

## 2021-05-13 PROCEDURE — 80143 DRUG ASSAY ACETAMINOPHEN: CPT | Performed by: NURSE PRACTITIONER

## 2021-05-13 PROCEDURE — 80179 DRUG ASSAY SALICYLATE: CPT | Performed by: NURSE PRACTITIONER

## 2021-05-13 PROCEDURE — 93005 ELECTROCARDIOGRAM TRACING: CPT | Performed by: NURSE PRACTITIONER

## 2021-05-13 PROCEDURE — 96372 THER/PROPH/DIAG INJ SC/IM: CPT | Performed by: NURSE PRACTITIONER

## 2021-05-13 PROCEDURE — 99285 EMERGENCY DEPT VISIT HI MDM: CPT | Mod: 25 | Performed by: NURSE PRACTITIONER

## 2021-05-13 PROCEDURE — 85025 COMPLETE CBC W/AUTO DIFF WBC: CPT | Performed by: NURSE PRACTITIONER

## 2021-05-13 PROCEDURE — 36415 COLL VENOUS BLD VENIPUNCTURE: CPT | Performed by: NURSE PRACTITIONER

## 2021-05-13 PROCEDURE — 80048 BASIC METABOLIC PNL TOTAL CA: CPT | Performed by: NURSE PRACTITIONER

## 2021-05-13 PROCEDURE — 250N000011 HC RX IP 250 OP 636: Performed by: NURSE PRACTITIONER

## 2021-05-13 PROCEDURE — 87635 SARS-COV-2 COVID-19 AMP PRB: CPT | Performed by: NURSE PRACTITIONER

## 2021-05-13 PROCEDURE — 71045 X-RAY EXAM CHEST 1 VIEW: CPT

## 2021-05-13 PROCEDURE — C9803 HOPD COVID-19 SPEC COLLECT: HCPCS | Performed by: NURSE PRACTITIONER

## 2021-05-13 RX ORDER — ONDANSETRON 4 MG/1
4 TABLET, ORALLY DISINTEGRATING ORAL ONCE
Status: COMPLETED | OUTPATIENT
Start: 2021-05-13 | End: 2021-05-13

## 2021-05-13 RX ORDER — SODIUM CHLORIDE 9 MG/ML
INJECTION, SOLUTION INTRAVENOUS CONTINUOUS
Status: DISCONTINUED | OUTPATIENT
Start: 2021-05-13 | End: 2021-05-14 | Stop reason: HOSPADM

## 2021-05-13 RX ORDER — OLANZAPINE 10 MG/2ML
10 INJECTION, POWDER, FOR SOLUTION INTRAMUSCULAR ONCE
Status: COMPLETED | OUTPATIENT
Start: 2021-05-13 | End: 2021-05-13

## 2021-05-13 RX ORDER — LORAZEPAM 2 MG/ML
2 INJECTION INTRAMUSCULAR ONCE
Status: COMPLETED | OUTPATIENT
Start: 2021-05-13 | End: 2021-05-13

## 2021-05-13 RX ORDER — ONDANSETRON 2 MG/ML
4 INJECTION INTRAMUSCULAR; INTRAVENOUS ONCE
Status: DISCONTINUED | OUTPATIENT
Start: 2021-05-13 | End: 2021-05-13

## 2021-05-13 RX ADMIN — LORAZEPAM 2 MG: 2 INJECTION INTRAMUSCULAR; INTRAVENOUS at 21:06

## 2021-05-13 RX ADMIN — OLANZAPINE 10 MG: 10 INJECTION, POWDER, LYOPHILIZED, FOR SOLUTION INTRAMUSCULAR at 21:06

## 2021-05-13 RX ADMIN — ONDANSETRON 4 MG: 4 TABLET, ORALLY DISINTEGRATING ORAL at 16:27

## 2021-05-13 RX ADMIN — LORAZEPAM 2 MG: 2 INJECTION INTRAMUSCULAR; INTRAVENOUS at 15:13

## 2021-05-13 RX ADMIN — OLANZAPINE 10 MG: 10 INJECTION, POWDER, LYOPHILIZED, FOR SOLUTION INTRAMUSCULAR at 15:01

## 2021-05-13 ASSESSMENT — PATIENT HEALTH QUESTIONNAIRE - PHQ9: SUM OF ALL RESPONSES TO PHQ QUESTIONS 1-9: 9

## 2021-05-13 ASSESSMENT — ANXIETY QUESTIONNAIRES: GAD7 TOTAL SCORE: 9

## 2021-05-13 ASSESSMENT — ASTHMA QUESTIONNAIRES: ACT_TOTALSCORE: 19

## 2021-05-13 NOTE — ED PROVIDER NOTES
History     Chief Complaint   Patient presents with     Suicidal     HPI      used throughout this entire visit, at times patient refusing to look at the  screen.    Katy Islas is a 31 year old female with history of bipolar and borderline personality disorder, chronic back pain, asthma, and lumbar disc disease (s/p emergent fusion and neurostimulator) who arrives via EMS after mother called  for complaints of patient having a sudden onset of tingling all over, chest pain, and mother concerned for suicide statements that patient has made.  Patient arrives here screaming. Hyperventilating. Rocking back and forth. Diaphoretic. HR 150s.   Mother tells me patient has been talking to her father and saying he is standing next to her and her father  many years ago. Intermittently staring off, which mother states was similar to when she was a teenager having psychogenic seizures. Mother is very concerned for patient's mental health and does not feel patient is safe at home. Mother lives out of state. Mother says patient called her 2 weeks ago yelling at her for leaving her. Patient was acting like her mother had been with her and mother could not convince patient that she was not there.  Unable to assess patient initially due to how combative and agitated she is. She refuses to look at the  screen.     Allergies:  Allergies   Allergen Reactions     Iohexol Hives     Other reaction(s): Hives  IV contrast; patient states she tolerates contrast if she gets benadryl before  IV contrast; patient states she tolerates contrast if she gets benadryl before     Baclofen      hives     Bees Hives, Swelling and Difficulty breathing     Contrast Dye      Hives,   Updated 5/10/2016 CT Contrast.     Iodine Hives     Metoclopramide      Other reaction(s): Tremors  LW Reaction: shaking/sweating     Midazolam      Other reaction(s): Agitation     Monosodium Glutamate       Morphine Other (See Comments)     Difficulty with urination     Nsaids Other (See Comments)     GI BLEED x2     Other (Do Not Use) Other (See Comments)     Xanaflex- pt becomes disoriented and loses bladder control     Reglan [Metoclopramide Hcl] Other (See Comments)     shaking     Soma [Carisoprodol] Visual Disturbance     Sleep walking     Tizanidine      Topamax Other (See Comments)     Topamax [Topiramate] Nausea     Tingling  GI/Vomit     Tramadol      Severe Headache, Seizure Risk     Tylenol W/Codeine [Acetaminophen-Codeine] Nausea and Itching     Tylenol 3     Versed Other (See Comments)     Zolmitriptan      Makes face feel like its twitching     Droperidol Anxiety     Flu Virus Vaccine Rash      Arm swelling       Problem List:    Patient Active Problem List    Diagnosis Date Noted     Renal cyst 05/12/2021     Priority: Medium     Borderline personality disorder (H) 04/27/2021     Priority: Medium     Lymphedema 09/10/2020     Priority: Medium     Patellofemoral pain syndrome of both knees 06/11/2020     Priority: Medium     Primary osteoarthritis of both knees 06/11/2020     Priority: Medium     Abscess of anal and rectal regions 04/27/2020     Priority: Medium     Added automatically from request for surgery [id number]       Rectal pain 04/19/2020     Priority: Medium     Abdominal pain 04/18/2020     Priority: Medium     Morbid obesity (H) 03/10/2020     Priority: Medium     Abnormal uterine bleeding 02/12/2020     Priority: Medium     Added automatically from request for surgery [id number]       Anal fissure 02/04/2020     Priority: Medium     Low hematocrit 02/04/2020     Priority: Medium     Elevated d-dimer 02/04/2020     Priority: Medium     Hypertension goal BP (blood pressure) < 130/80 08/05/2019     Priority: Medium     Bulge of cervical disc without myelopathy 04/24/2019     Priority: Medium     Cervicalgia 04/17/2019     Priority: Medium     Class 1 obesity due to excess calories without serious  comorbidity with body mass index (BMI) of 32.0 to 32.9 in adult 04/03/2019     Priority: Medium     Hypophosphatemia 11/01/2018     Priority: Medium     Patellar maltracking, right 09/05/2018     Priority: Medium     Right anterior knee pain 09/05/2018     Priority: Medium     Melanocytic nevus, unspecified location 07/23/2018     Priority: Medium     Dysplastic skin lesion 07/23/2018     Priority: Medium     Iliotibial band syndrome affecting lower leg, right 12/21/2017     Priority: Medium     Chondromalacia of right patellofemoral joint 12/21/2017     Priority: Medium     Upper GI bleed - suspected 07/01/2017     Priority: Medium     Tobacco use disorder 07/01/2017     Priority: Medium     History of pseudoseizure 07/01/2017     Priority: Medium     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) 03/10/2017     Priority: Medium     Lumbar disc disease with radiculopathy 02/23/2017     Priority: Medium     S/P lumbar fusion 02/23/2017     Priority: Medium     Dr. YOVANI Millan, 2/23/17       Vitamin D deficiency 01/06/2017     Priority: Medium     Atypical mole 01/06/2017     Priority: Medium     Mild persistent asthma without complication 12/02/2016     Priority: Medium     Chronic bilateral low back pain with left-sided sciatica 12/02/2016     Priority: Medium     Bee sting allergy 09/16/2016     Priority: Medium     C. difficile colitis 08/26/2016     Priority: Medium     Gastroesophageal reflux disease without esophagitis 08/26/2016     Priority: Medium     Herpes simplex virus infection 11/12/2015     Priority: Medium     Adjustment disorder with mixed anxiety and depressed mood 10/14/2015     Priority: Medium     Bipolar disorder (H) 01/31/2013     Priority: Medium     Chronic pain 02/09/2012     Priority: Medium     Patient is followed by Aura Rosario PA-C for ongoing prescription of pain medication.  All refills should only be approved by this provider, or covering  partner.    Medication(s): Norco    Maximum quantity per month: 70  Clinic visit frequency required: Q 2 months     Controlled substance agreement:   Discussed and signed 4/25/17    Encounter-Level CSA - 01/12/2015:                 Controlled Substance Agreement - Scan on 1/26/2015  9:14 AM : Controlled Medication Agreement-01/12/15 (below)            Pain Clinic evaluation in the past: Yes       Date/Location:   [facility], fall 2016    DIRE Total Score(s):    4/25/2017   Total Score 17       Last [facility] website verification:  done on 4/25/17 by LOVE Maki   [email or web address]           JULIETH (generalized anxiety disorder) 09/22/2011     Priority: Medium     Mild major depression (H) 08/29/2011     Priority: Medium     Crohn's colitis (H) 08/04/2009     Priority: Medium     Dysmenorrhea 05/11/2007     Priority: Medium     Benign neoplasm of skin of lower limb, including hip 03/16/2004     Priority: Medium     Migraine      Priority: Medium     Dr. Farrar - Neurology.  Now on Inderal.  Seems to be working.  Follow-up 9/08  Problem list name updated by automated process. Provider to review       Hearing loss      Priority: Medium     Congenital  Problem list name updated by automated process. Provider to review       Allergic rhinitis      Priority: Medium     Problem list name updated by automated process. Provider to review          Past Medical History:    Past Medical History:   Diagnosis Date     Abdominal pain 10/31- 11/4/2005     Allergic rhinitis, cause unspecified      Arthritis      C. 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, without mention of intractable epilepsy      Migraine  07/21/12     Migraine, unspecified, without mention of intractable migraine without mention of status migrainosus      Mild intermittent asthma      Mycoplasma infection in conditions classified elsewhere and of unspecified site      Other chronic pain      Renal disease      Unspecified hearing loss        Past Surgical History:    Past Surgical History:   Procedure Laterality Date     AS REMOVAL OF COCCYX  10/18/2017     C FUSION OF SACROILIAC JOINT  10/26/2018     COLONOSCOPY  7/1/2009    [facility].     COLONOSCOPY N/A 7/17/2019    Procedure: Colonoscopy, With Polypectomy And Biopsy;  Surgeon: Edwin Conde MD;  Location: [facility] OR     COLONOSCOPY WITH CO2 INSUFFLATION N/A 7/17/2019    Procedure: COLONOSCOPY, WITH CO2 INSUFFLATION;  Surgeon: Edwin Conde MD;  Location: [facility] OR     COMBINED ESOPHAGOSCOPY, GASTROSCOPY, DUODENOSCOPY (EGD) WITH CO2 INSUFFLATION N/A 4/12/2019    Procedure: COMBINED ESOPHAGOSCOPY, GASTROSCOPY, DUODENOSCOPY (EGD) WITH CO2 INSUFFLATION;  Surgeon: Edwin Conde MD;  Location: [facility] OR     ESOPHAGOSCOPY, GASTROSCOPY, DUODENOSCOPY (EGD), COMBINED N/A 4/12/2019    Procedure: Combined Esophagoscopy, Gastroscopy, Duodenoscopy (Egd), Biopsy Single Or Multiple;  Surgeon: Edwin Conde MD;  Location: [facility] OR     FISTULOTOMY RECTUM N/A 4/30/2020    Procedure: EXAM UNDER ANESTHESIA, ANUS, parital fistulotomy;  Surgeon: Valentin Sanchez MD;  Location: [facility] OR     FUSION SPINE POSTERIOR MINIMALLY INVASIVE ONE LEVEL N/A 2/23/2017    Procedure: FUSION SPINE POSTERIOR MINIMALLY INVASIVE ONE LEVEL;  L4-5 Oblique Lateral Lumbar Interbody Fusion   Epidural steroid injection.   Transpedicular Bone marrow aspiration;  Surgeon: Jeniffer Eugene MD;  Location: [facility] OR     HC COLONOSCOPY THRU STOMA WITH BIOPSY  10/29/2003    Impression is that of normal appearing colonoscopy, without evidence of rectal bleeding.     HC COLONOSCOPY THRU STOMA,  DIAGNOSTIC  10/00    normal     HC COLONOSCOPY THRU STOMA, DIAGNOSTIC  Oct 2009    Dr López- normal     HC EEG AWAKE AND SLEEP      abnormal     HC MRI BRAIN W/O CONTRAST  12/00    normal     HC REMOVAL GALLBLADDER  8/5/2009    [facility], [facility].      UGI ENDOSCOPY DIAG W BIOPSY  11/11/09    Normal esophagus     ORTHOPEDIC SURGERY  October 19,2011    diskectomy L4-L5     [facility] UGI ENDOSCOPY, SIMPLE EXAM  7/00, 10/00    mild chronic esophagitis and duodenitis, neg H pylori     [facility] UGI ENDOSCOPY, SIMPLE EXAM  01/20/2005    Esophagogastroduodenoscopy, colonoscopy with biopsies.  [facility] UGI ENDOSCOPY, SIMPLE EXAM  7/1/2009    [facility], [facility].     [facility] UGI ENDOSCOPY, SIMPLE EXAM  11/11/2009    attempted upper GI, pt. could not tolerate procedure:[facility]       Family History:    Family History   Problem Relation Age of Onset     Gastrointestinal Disease Brother         severe Crohn's     Neurologic Disorder Brother         Seizures post head injury     Depression Brother      Substance Abuse Brother      Genitourinary Problems Father         kidney stones     Diabetes Father      Heart Disease Father         Open heart surgery     Breast Cancer Maternal Grandmother      Parkinsonism Maternal Grandmother      Cerebrovascular Disease Paternal Grandmother      Cancer Maternal Grandfather         Lung     Cardiovascular Paternal Grandfather         Heart Attack     Substance Abuse Mother         in recovery x1 year      Lung Cancer Paternal Uncle      Cancer Paternal Uncle      Lung Cancer Maternal Uncle      Colon Cancer Maternal Uncle        Social History:  Marital Status:  Single [1]  Social History     Tobacco Use     Smoking status: Current Every Day Smoker     Packs/day: 0.25     Years: 5.00     Pack years: 1.25     Types: Cigarettes     Smokeless tobacco: Never Used   Substance Use Topics     Alcohol use: Not Currently     Comment: Not since  last July 2018     Drug use: Not Currently     Types: Marijuana     Comment: Medical Marijuana currently-- More CBD        Medications:    albuterol (PROAIR HFA/PROVENTIL HFA/VENTOLIN HFA) 108 (90 Base) MCG/ACT inhaler  albuterol (PROVENTIL) (2.5 MG/3ML) 0.083% neb solution  alum & mag hydroxide-simethicone (MAALOX ADVANCED MAX ST) 400-400-40 MG/5ML SUSP suspension  aluminum chloride (DRYSOL) 20 % external solution  ARIPiprazole (ABILIFY) 30 MG tablet  azelastine (OPTIVAR) 0.05 % ophthalmic solution  buprenorphine (SUBUTEX) 2 MG SUBL sublingual tablet  buPROPion (WELLBUTRIN XL) 300 MG 24 hr tablet  busPIRone (BUSPAR) 15 MG tablet  cephALEXin (KEFLEX) 500 MG capsule  clonazePAM (KLONOPIN) 1 MG tablet  cyclobenzaprine (FLEXERIL) 10 MG tablet  diclofenac (VOLTAREN) 1 % topical gel  dicyclomine (BENTYL) 20 MG tablet  diltiazem 2% in PLO gel  diphenhydrAMINE (BENADRYL) 50 MG capsule  DULoxetine (CYMBALTA) 60 MG EC capsule  EPINEPHrine (ANY BX GENERIC EQUIV) 0.3 MG/0.3ML injection 2-pack  furosemide (LASIX) 20 MG tablet  gabapentin (NEURONTIN) 300 MG capsule  guaiFENesin-codeine (ROBITUSSIN AC) 100-10 MG/5ML solution  hyoscyamine (LEVSIN) 0.125 MG tablet  lamoTRIgine (LAMICTAL) 25 MG tablet  lidocaine (XYLOCAINE) 2 % solution  medical cannabis (Patient's own supply)  medroxyPROGESTERone (DEPO-PROVERA) 150 MG/ML IM injection  methocarbamol (ROBAXIN) 750 MG tablet  methylPREDNISolone (MEDROL DOSEPAK) 4 MG tablet therapy pack  metoprolol succinate ER (TOPROL-XL) 50 MG 24 hr tablet  Multiple Vitamins-Minerals (EQ MULTIVITAMINS ADULT GUMMY) CHEW  naproxen (NAPROSYN) 500 MG tablet  NONFORMULARY  oxyCODONE-acetaminophen (PERCOCET) 7.5-325 MG per tablet  pantoprazole (PROTONIX) 40 MG EC tablet  penicillin V (VEETID) 500 MG tablet  prochlorperazine (COMPAZINE) 10 MG tablet  rizatriptan (MAXALT-MLT) 5 MG ODT  valACYclovir (VALTREX) 1000 mg tablet          Review of Systems   Constitutional: Negative.    HENT: Negative.     Respiratory: Negative.    Cardiovascular: Positive for chest pain.   Gastrointestinal: Negative.    Musculoskeletal: Positive for back pain.   Skin: Negative.    Neurological: Positive for numbness (tingling in legs and arms.).   Psychiatric/Behavioral: Positive for agitation, behavioral problems, confusion, hallucinations and suicidal ideas.   All other systems reviewed and are negative.      Physical Exam   BP: 94/60  Pulse: 153  Temp: 98.7  F (37.1  C)  Resp: (!) 32  Weight: 85.3 kg (188 lb)  SpO2: (!) 88 %      Physical Exam  Constitutional:       General: She is in acute distress.      Comments: On arrival patient distressed, hyperventilating. Tachycardic.    HENT:      Head: Normocephalic and atraumatic.      Right Ear: Tympanic membrane, ear canal and external ear normal.      Left Ear: Tympanic membrane, ear canal and external ear normal.      Ears:      Comments: There is some dried blood in the base of the left ear canal.       Mouth/Throat:      Mouth: Mucous membranes are moist.      Comments: Lips appear dry.  Eyes:      General: No scleral icterus.     Conjunctiva/sclera: Conjunctivae normal.   Cardiovascular:      Rate and Rhythm: Regular rhythm. Tachycardia present.   Pulmonary:      Effort: Tachypnea (hyperventilating) present.      Breath sounds: Normal breath sounds.   Musculoskeletal:      Comments: Healed scar on her lumbar spine. No overlying edema or erythema.    Skin:     General: Skin is warm and dry.      Findings: Rash (base of her neck) present.   Neurological:      General: No focal deficit present.      Mental Status: She is alert and oriented to person, place, and time.      Sensory: Sensation is intact.      Motor: Motor function is intact.      Coordination: Coordination is intact.      Comments: She was able to sit up, bear weight and stand. She sat back down and able to swing her legs up into the bed.   Psychiatric:         Mood and Affect: Mood is anxious.         Behavior:  Behavior is agitated and aggressive.         Thought Content: Thought content includes suicidal (statements made to her mother) ideation.         ED Course        Procedures                  EKG Interpretation:      Interpreted by NATA Tamayo CNP  Time reviewed: 1520   Symptoms at time of EKG: agitated, hyperventilating and chest pain   Rhythm: Sinus tachycardia  Rate: Tachycardia and 100-110  Axis: Normal  Ectopy: None  Conduction: Normal  ST Segments/ T Waves: No ST-T wave changes and No acute ischemic changes. Short Negro =110  Q Waves: None  Comparison to prior: Unchanged from 12/27/2020    Clinical Impression: sinus tachycardia, no acute ischemic changes.      Results for orders placed or performed during the hospital encounter of 05/13/21 (from the past 24 hour(s))   XR Chest Port 1 View    Narrative    XR CHEST PORT 1 VIEW 5/13/2021 3:37 PM    HISTORY: short of breath    COMPARISON: Chest x-ray 1/14/2021      Impression    IMPRESSION: Asymmetric elevation of the left hemidiaphragm. Prominent  gas containing stomach. Stable spinal stimulator leads. Mild left  basilar opacities likely reflect atelectasis. The right lung is clear.  No pleural effusion. Stable heart size.    THOM SCHERER MD   Lumbar spine XR, 2-3 views    Narrative    LUMBAR SPINE TWO TO THREE VIEWS   5/13/2021 8:26 PM     HISTORY: Back pain. No trauma. History of neurostimulator placed  9/2020.    COMPARISON: 02/06/2019      Impression    IMPRESSION: Prior anterior and posterior fusion procedure with  posterior instrumentation and rods. There is also fusion across the  left sacroiliac joint. No evidence for hardware failure. Vertebral  body heights are maintained. Posterior alignment is normal.  Neurostimulator device with dual leads projecting in the posterior  epidural space.    HALLIE AUGUSTE MD   CBC with platelets differential   Result Value Ref Range    WBC 9.1 4.0 - 11.0 10e9/L    RBC Count 4.00 3.8 - 5.2 10e12/L     Hemoglobin 13.7 11.7 - 15.7 g/dL    Hematocrit 39.5 35.0 - 47.0 %    MCV 99 78 - 100 fl    MCH 34.3 (H) 26.5 - 33.0 pg    MCHC 34.7 31.5 - 36.5 g/dL    RDW 12.3 10.0 - 15.0 %    Platelet Count 267 150 - 450 10e9/L    Diff Method Automated Method     % Neutrophils 77.8 %    % Lymphocytes 14.8 %    % Monocytes 6.9 %    % Eosinophils 0.1 %    % Basophils 0.2 %    % Immature Granulocytes 0.2 %    Nucleated RBCs 0 0 /100    Absolute Neutrophil 7.1 1.6 - 8.3 10e9/L    Absolute Lymphocytes 1.4 0.8 - 5.3 10e9/L    Absolute Monocytes 0.6 0.0 - 1.3 10e9/L    Absolute Eosinophils 0.0 0.0 - 0.7 10e9/L    Absolute Basophils 0.0 0.0 - 0.2 10e9/L    Abs Immature Granulocytes 0.0 0 - 0.4 10e9/L    Absolute Nucleated RBC 0.0    Salicylate level   Result Value Ref Range    Salicylate Level <2 mg/dL   Basic metabolic panel   Result Value Ref Range    Sodium 138 133 - 144 mmol/L    Potassium 4.1 3.4 - 5.3 mmol/L    Chloride 105 94 - 109 mmol/L    Carbon Dioxide 30 20 - 32 mmol/L    Anion Gap 3 3 - 14 mmol/L    Glucose 97 70 - 99 mg/dL    Urea Nitrogen 17 7 - 30 mg/dL    Creatinine 0.76 0.52 - 1.04 mg/dL    GFR Estimate >90 >60 mL/min/[1.73_m2]    GFR Estimate If Black >90 >60 mL/min/[1.73_m2]    Calcium 9.2 8.5 - 10.1 mg/dL   Acetaminophen level   Result Value Ref Range    Acetaminophen Level <2 mg/L   Asymptomatic SARS-CoV-2 COVID-19 Virus (Coronavirus) by PCR    Specimen: Nasopharyngeal   Result Value Ref Range    SARS-CoV-2 Virus Specimen Source Nasopharyngeal     SARS-CoV-2 PCR Result NEGATIVE     SARS-CoV-2 PCR Comment (Note)      *Note: Due to a large number of results and/or encounters for the requested time period, some results have not been displayed. A complete set of results can be found in Results Review.       Medications   0.9% sodium chloride BOLUS (1,000 mLs Intravenous Not Given 5/13/21 1914)     Followed by   sodium chloride 0.9% infusion (has no administration in time range)   OLANZapine (zyPREXA) injection 10 mg (10  mg Intramuscular Given 5/13/21 1501)   LORazepam (ATIVAN) injection 2 mg (2 mg Intramuscular Given 5/13/21 1513)   ondansetron (ZOFRAN-ODT) ODT tab 4 mg (4 mg Oral Given 5/13/21 1627)   OLANZapine (zyPREXA) injection 10 mg (10 mg Intramuscular Given 5/13/21 2106)   LORazepam (ATIVAN) injection 2 mg (2 mg Intramuscular Given 5/13/21 2106)     1455: at bedside. Patient agitated, combative.  1500: ordered Zyprexa 10 mg IM and Ativan 2 mg IM.  1530: patient more calm, able to do evaluation.     ---patient complains of left ear pain, rash on base of her neck.   4:53 PM spoke with  Cathy Sheffield, behavioral health manager.  I recommend patient admission for psychiatric evaluation.  We will have the behavioral health  attempt to make a virtual assessment, though I am not sure that we will be successful.  5:21 PM spoke with [facility]  who spoke with patient's mother.  Patient's mother did tell the  that patient has made statements of suicidal ideation. [facility] will attempt to assess patient if she is cooperative.  5:45 PM patient was too sleepy at this time and not participating with behavioral health assessment (DEC).  6:25 PM patient sleeping.   7:40 PM patient awake. Continues on ALYSON hold.  Patient may she is concerned that she thinks there is a lump on her back where her neurostimulator is.  Patient states her legs have felt tingling and hard to walk lately.  I agreed to obtain an x-ray of her back and to evaluate the neurostimulator lead placement.  Patient agreed to let lab obtain blood.  I spoke with behavioral health again and they will attempt to make assessment.  7:50 PM the  was unable to do the behavioral assessment evaluation with the  on another computer makes it hard to do the assessment. Recommend admit for psychiatric assessment.  8:30 PM patient informed that she remains on a ALYSON hold. Patient angry. Swearing at me. Throwing things at me. She refuses  to look at the  screen most of the time, limiting my ability to talk to her. Patient angry that her mother has left.  8:50 PM patient continues to throw things, pounding on the wall, swearing. Refuses to let the nurse give her medication for her agitation.   9:05 PM Code 21 called. Patient did look at the  (on ipad) and I was able to explain that her escalated behavior/agitation is unacceptable and I am giving her medication to help with her anger and agitation. Patient did lay down in the bed without force.  She continued to scream obscenities at the staff but nurse was able to administer IM Ativan and IM Zyprexa.    9:33 PM patient continues to yell and swear.  10:00 PM patient less agitated.  10:30 PM patient sleeping. [facility] has a bed and accepts patient for admission and will be able to take report after midnight.  11:00 PM patient sleeping.  12:21 AM patient sleeping. 72-hour hold has been placed.  12:25 AM informed by nurse that [facility] will not take patient tonight because they do not have  capabilities during the night, but will take her for admission tomorrow.    Assessments & Plan (with Medical Decision Making)     31-year-old female with bipolar and borderline personality disorder who arrives via EMS.  Patient arrives agitated, combative, hyperventilating, yelling, and was difficult to evaluate. Mother reporting patient has been having visual hallucinations, talking to her dead father. Patient has made several statements to her mother of being suicidal. Patient was given IM Zyprexa and IM Ativan shortly after arrival. Patient became less agitated and slept for a couple hours. She woke up and was more cooperative. I was then able to do a physical exam. She has a faint rash on the base of her neck, questionable fungal rash and may benefit from an antifungal cream.  Pt complains of back pain, which is chronic. Pt worried about her stimulator that was  placed last year and thinks there is swelling in her back. No concerning exam findings of her back- no erythema or swelling. Pt is afebrile. EKG sinus tachycardia, but HR improved here and is in the upper 80s-90s. Labs are normal. Xray of the lumbar spine is not concerning. Patient able to stand up and weight bear. She is able to lift her legs up into bed.     We have been unable to obtain an adequate behavioral health evaluation. It is difficult to do this virtually with the  on an I-pad and the psychologist on another computer. I spoke with behavioral health  and they agree patient needs admission to obtain an appropriate psychiatric evaluation. Patient has had increased agitation after she was told about my recommendation for admission. Patient has been aware that she is on a health officer hold. Patient's behavior escalated and she became violent throwing things in the room, pounding on the wall, swearing at me and the staff. Code-21 was called and a second dose of IM Ativan and IM Zyprexa was given after she refused to stop her behavior. Patient deescalated and has been sleeping. [facility] has accepted the patient for transfer and did have a bed. However, they are unable to take patient until tomorrow because they have no sign- capabilities. I spoke with the Nursing Supervisor at [facility]. She will look further into the reasoning for delay in transfer.       I have signed out this patient to Dr. Zamora, emergency physician.    I have reviewed the nursing notes.    I have reviewed the findings, diagnosis, plan and need for follow up with the patient.      New Prescriptions    No medications on file       Final diagnoses:   Suicidal ideation   Hallucinations   Agitation       5/13/2021   [facility] EMERGENCY DEPT     Belkis, Akila Santiago, NATA CNP  05/14/21 0120

## 2021-05-13 NOTE — TELEPHONE ENCOUNTER
Patient was seen in the ED on 05/13/21. The patient had an appointment with  and it was cancelled.     This nurse will call patient tomorrow to follow-up.       Jose Finney RN, BSN  [place]  May 13, 2021

## 2021-05-13 NOTE — TELEPHONE ENCOUNTER
Patient was transferred to triage.     Patient states there is a rash on her back going onto her shoulders that started a few days ago. The area is red and warm to the touch. She states that hair is starting to fall out. The skin also has a sticky feel to it. The area is painful and intensity is rated at 5-6/10.     PLAN:   Huddle with PCP.   Sent mychart asking if she has tried hydrocortisone. Patient will also be sending us a picture.       Jose Finney, RN, BSN  [place] [facility]  May 13, 2021      Reason for Disposition    Localized rash is very painful (no fever)    Additional Information    Negative: Possible contact with poison ivy or oak    Negative: Insect bite(s) suspected    Negative: Athlete's Foot suspected (i.e., itchy rash between the toes)    Negative: Jock Itch suspected (i.e., itchy rash on inner thighs near genital area)    Negative: Wound infection suspected (i.e., pain, spreading redness, or pus; in a cut, puncture, scrape or sutured wound)    Negative: Rash of external female genital area (vulva)    Negative: Rash of penis or scrotum    Negative: Small spot, skin growth, or mole    Negative: Fever and localized purple or blood-colored spots or dots that are not from injury or friction    Negative: Fever and localized rash is very painful    Negative: Patient sounds very sick or weak to the triager    Negative: Looks like a boil, infected sore, deep ulcer, or other infected rash (spreading redness, pus)    Negative: Painful rash with multiple small blisters grouped together (i.e., dermatomal distribution or 'band' or 'stripe')    Protocols used: RASH OR REDNESS - [id number]

## 2021-05-13 NOTE — TELEPHONE ENCOUNTER
No, I can not send any pain medication for a rash. The pain is likely inside from her neck issues, not from the rash. The only rash that hurts is shingles, and this doesn't look like shingles. Dermatology would take a long time to get into. It hasn't been there long enough to warrant a biopsy.     I recommend she try the hydrocortisone for 4-5 days, if doesn't improve, then could try OTC antifungal like Lotrimin. Rash is not consistent with bacteria infection.    Zach Rosario PA-C  [facility]

## 2021-05-13 NOTE — ED NOTES
"Bed: ED04  Expected date: 5/13/21  Expected time: 2:12 PM  Means of arrival:   Comments:  [facility] \"psych pt\". 71 F  "

## 2021-05-13 NOTE — TELEPHONE ENCOUNTER
Patient was informed of the message below. Patient hung up on me. Patient has an appointment with  today for her ear. Patient stated that she has been vomiting and has brown fluid coming out of her ear.     Jose Finney RN, BSN  [place] [facility]  May 13, 2021

## 2021-05-14 ENCOUNTER — OFFICE VISIT (OUTPATIENT)
Dept: INTERPRETER SERVICES | Facility: CLINIC | Age: 31
End: 2021-05-14
Payer: MEDICARE

## 2021-05-14 ENCOUNTER — HOSPITAL ENCOUNTER (INPATIENT)
Facility: CLINIC | Age: 31
LOS: 1 days | Discharge: HOME | DRG: 885 | End: 2021-05-14
Attending: PSYCHIATRY & NEUROLOGY | Admitting: PSYCHIATRY & NEUROLOGY
Payer: MEDICARE

## 2021-05-14 PROBLEM — R45.89 SUICIDAL BEHAVIOR: Status: ACTIVE | Noted: 2021-05-14

## 2021-05-14 LAB
ALBUMIN SERPL-MCNC: 3.5 G/DL (ref 3.4–5)
ALP SERPL-CCNC: 75 U/L (ref 40–150)
ALT SERPL W P-5'-P-CCNC: 40 U/L (ref 0–50)
AST SERPL W P-5'-P-CCNC: 13 U/L (ref 0–45)
BILIRUB DIRECT SERPL-MCNC: 0.1 MG/DL (ref 0–0.2)
BILIRUB SERPL-MCNC: 0.3 MG/DL (ref 0.2–1.3)
CHOLEST SERPL-MCNC: 177 MG/DL
HDLC SERPL-MCNC: 40 MG/DL
LDLC SERPL CALC-MCNC: 115 MG/DL
NONHDLC SERPL-MCNC: 137 MG/DL
PROT SERPL-MCNC: 6.5 G/DL (ref 6.8–8.8)
TRIGL SERPL-MCNC: 109 MG/DL
TSH SERPL DL<=0.005 MIU/L-ACNC: 0.9 MU/L (ref 0.4–4)

## 2021-05-14 PROCEDURE — 36415 COLL VENOUS BLD VENIPUNCTURE: CPT | Performed by: PSYCHIATRY & NEUROLOGY

## 2021-05-14 PROCEDURE — 99236 HOSP IP/OBS SAME DATE HI 85: CPT | Performed by: PSYCHIATRY & NEUROLOGY

## 2021-05-14 PROCEDURE — 80076 HEPATIC FUNCTION PANEL: CPT | Performed by: PSYCHIATRY & NEUROLOGY

## 2021-05-14 PROCEDURE — 84443 ASSAY THYROID STIM HORMONE: CPT | Performed by: PSYCHIATRY & NEUROLOGY

## 2021-05-14 PROCEDURE — 124N000002 HC R&B MH UMMC

## 2021-05-14 PROCEDURE — 80061 LIPID PANEL: CPT | Performed by: PSYCHIATRY & NEUROLOGY

## 2021-05-14 PROCEDURE — T1013 SIGN LANG/ORAL INTERPRETER: HCPCS | Mod: U3

## 2021-05-14 RX ORDER — HYDROXYZINE HYDROCHLORIDE 25 MG/1
25 TABLET, FILM COATED ORAL EVERY 4 HOURS PRN
Status: DISCONTINUED | OUTPATIENT
Start: 2021-05-14 | End: 2021-05-14 | Stop reason: HOSPADM

## 2021-05-14 RX ORDER — OLANZAPINE 10 MG/2ML
10 INJECTION, POWDER, FOR SOLUTION INTRAMUSCULAR 3 TIMES DAILY PRN
Status: DISCONTINUED | OUTPATIENT
Start: 2021-05-14 | End: 2021-05-14 | Stop reason: HOSPADM

## 2021-05-14 RX ORDER — AMOXICILLIN 250 MG
1 CAPSULE ORAL 2 TIMES DAILY PRN
Status: DISCONTINUED | OUTPATIENT
Start: 2021-05-14 | End: 2021-05-14 | Stop reason: HOSPADM

## 2021-05-14 RX ORDER — TRAZODONE HYDROCHLORIDE 50 MG/1
50 TABLET, FILM COATED ORAL
Status: DISCONTINUED | OUTPATIENT
Start: 2021-05-14 | End: 2021-05-14 | Stop reason: HOSPADM

## 2021-05-14 RX ORDER — MAGNESIUM HYDROXIDE/ALUMINUM HYDROXICE/SIMETHICONE 120; 1200; 1200 MG/30ML; MG/30ML; MG/30ML
30 SUSPENSION ORAL EVERY 4 HOURS PRN
Status: DISCONTINUED | OUTPATIENT
Start: 2021-05-14 | End: 2021-05-14 | Stop reason: HOSPADM

## 2021-05-14 RX ORDER — OLANZAPINE 10 MG/1
10 TABLET ORAL 3 TIMES DAILY PRN
Status: DISCONTINUED | OUTPATIENT
Start: 2021-05-14 | End: 2021-05-14 | Stop reason: HOSPADM

## 2021-05-14 RX ORDER — POLYETHYLENE GLYCOL 3350 17 G
2 POWDER IN PACKET (EA) ORAL
Status: DISCONTINUED | OUTPATIENT
Start: 2021-05-14 | End: 2021-05-14 | Stop reason: HOSPADM

## 2021-05-14 ASSESSMENT — ACTIVITIES OF DAILY LIVING (ADL)
COMMUNICATION: DIFFICULTY SPEAKING
WEAR_GLASSES_OR_BLIND: YES
DRESS: SCRUBS (BEHAVIORAL HEALTH)
VISION_MANAGEMENT: GLASSES
WERE_AUXILIARY_AIDS_OFFERED?: YES
DIFFICULTY_COMMUNICATING: YES
HYGIENE/GROOMING: INDEPENDENT
ORAL_HYGIENE: INDEPENDENT
PATIENT_/_FAMILY_COMMUNICATION_STYLE: SPOKEN LANGUAGE (ENGLISH OR BILINGUAL)
LAUNDRY: WITH SUPERVISION
HEARING_DIFFICULTY_OR_DEAF: YES

## 2021-05-14 ASSESSMENT — ENCOUNTER SYMPTOMS
HALLUCINATIONS: 1
RESPIRATORY NEGATIVE: 1
CONSTITUTIONAL NEGATIVE: 1
BACK PAIN: 1
AGITATION: 1
NUMBNESS: 1
GASTROINTESTINAL NEGATIVE: 1
CONFUSION: 1

## 2021-05-14 NOTE — TELEPHONE ENCOUNTER
Patient is currently admitted. Closing encounter.   Jose Finney RN, BSN  [place] [facility]  May 14, 2021

## 2021-05-14 NOTE — PLAN OF CARE
Behavioral Discharge Planning and Instructions    Summary: You were admitted on 5/14/2021  from the [facility] ER  due to concerns about suicidal ideation. You were placed on a 72 hour hold.  You were treated by Dr. Vazquez.  You were assessed using an . You were assessed as not being at risk of harming yourself and were granted the discharge that you were requesting. You were discharged on 5/14/21 [facility]. You mother transported you home.      Main Diagnosis: .   Unspecified psychosis (Brief psychotic disorder vs. Psychotic features of depression vs adjustment disorder with disturbance in emotion and conduct)  Major Depressive Disorder, recurrent, moderate r/o psychotic features  Generalized anxiety disorder   Borderline Personality Disorder  History of Psychogenic Non-Epileptiform Seizures  Opiate use disorder, severe, in remission   History of alcohol abuse  Crohn's disease  GERD  HTN  Hx of C Diff  Mild intermittent asthma  Chronic pain         Health Care Follow-up:   You have Medicare for  Health insurance.      You had this virtual appointment scheduled prior to admission.  May17 Telephone Visit with Junaid Pritchett PA-C  Monday May 17, 2021 8:00 AM  Please Note: This is a virtual visit; there is no need to come to the facility.    [facility]  [phone]         You meet with your therapist every Tuesday. Currently this is by video call.  [facility].  Evan Ferris        We scheduled an in-person  hospital follow up appointment with your primary care provider to talk to hr about your rent symptoms which seemed to be more of physical health in nature.  May19 Office Visit with Aura Rosario PA-C  Wednesday May 19, 2021 10:30 AM  Bring a current list of meds and any records pertaining to this visit.  For Physicals, please bring immunization records and any forms needing to be filled  out. Please arrive 10 minutes early to complete paperwork. [address]   [facility] [address] [phone]           Attend all scheduled appointments with your outpatient providers. Call at least 24 hours in advance if you need to reschedule an appointment to ensure continued access to your outpatient providers.     Major Treatments, Procedures and Findings:  You were provided with: a psychiatric assessment, assessed for medical stability, medication evaluation and/or management, milieu management and medical interventions      Your Covid-19 test was negative.  SARS-CoV-2 PCR Result 05/13/2021 10:35 PM [facility]   NEGATIVE    Comment:   SARS-CoV2 (COVID-19) RNA not detected, presumed negative.         Talk to your doctor about your cholesterol.  Cholesterol 177   <200 mg/dL Final 05/14/2021  7:52 AM 13   Triglycerides 109   <150 mg/dL Final 05/14/2021  7:52 AM 13   HDL Cholesterol 40  Low   >49 mg/dL Final 05/14/2021  7:52 AM 13   LDL Cholesterol Calculated 115  High   <100 mg/dL Final 05/14/2021  7:52 AM 13   Comment:   Above desirable:  100-129 mg/dl   Borderline High:  130-159 mg/dL   High:             160-189 mg/dL   Very high:       >189 mg/dl    Non HDL Cholesterol 137  High   <130 mg/dL Final 05/14/2021  7:52 AM 13   Comment:   Above Desirable:  130-159 mg/dl   Borderline high:  160-189 mg/dl   High:             190-219 mg/dl   Very high:       >219 mg/dl        Symptoms to Report: mood getting worse or thoughts of suicide    Early warning signs can include: increased thoughts or behaviors of suicide or self-harm     Safety and Wellness:  Take all medicines as directed.  Make no changes unless your doctor suggests them.      Follow treatment recommendations.  Refrain from alcohol and non-prescribed drugs.  If there is a concern for safety, call 911.    Resources:   National [place] [employer] Mental Illness  (www.mn.geoff.org): 328.737.7193 or 391-671-4315.    [facility]  Crisis Line for [facility] -  [phone]        [facility] Adult Mental Health Case Management  Phone: [phone] or [phone]        General Medication Instructions:   See your medication sheet(s) for instructions.   Take all medicines as directed.  Make no changes unless your doctor suggests them.   Go to all your doctor visits.  Be sure to have all your required lab tests. This way, your medicines can be refilled on time.  Do not use any drugs not prescribed by your doctor.  Avoid alcohol.    Advance Directives:   Scanned document on file with [employer]? No scanned doc  Is document scanned? Pt states no documents  Honoring Choices Your Rights Handout: Informed and given  Was more information offered? Materials given    The Treatment team has appreciated the opportunity to work with you. If you have any questions or concerns about your recent admission, you can contact the unit which can receive your call 24 hours a day, 7 days a week. They will be able to get in touch with a Provider if needed. The unit number is [phone] .

## 2021-05-14 NOTE — DISCHARGE SUMMARY
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 Diagnoses: Mild major depression (H); Bipolar disorder, current episode mixed, severe, without psychotic features (H)      cephALEXin (KEFLEX) 500 MG capsule Take 1 capsule (500 mg) by mouth 2 times daily for 10 days  Qty: 20 capsule, Refills: 0    Associated Diagnoses: Acute bronchitis treated with antibiotics in the past 60 days      clonazePAM (KLONOPIN) 1 MG tablet Take 1 tablet (1 mg) by mouth 2 times daily as needed for anxiety  Qty: 60 tablet, Refills: 0    Associated Diagnoses: Mild major depression (H); Anxiety      cyclobenzaprine (FLEXERIL) 10 MG tablet Take 1 tablet (10 mg) by mouth 3 times daily as needed for muscle spasms or other (back pain)  Qty: 90 tablet, Refills: 3    Associated Diagnoses: Chronic bilateral low back pain with left-sided sciatica      diclofenac (VOLTAREN) 1 % topical gel Place 2 g onto the skin 4 times daily as needed for moderate pain Stop if any gi upset or symptoms  Qty: 100 g, Refills: 0     Associated Diagnoses: Patellofemoral pain syndrome of both knees; Primary osteoarthritis of both knees      dicyclomine (BENTYL) 20 MG tablet Take 1 tablet (20 mg) by mouth every 6 hours  Qty: 90 tablet, Refills: 3    Associated Diagnoses: Crohn's disease of colon with fistula (H)      diltiazem 2% in PLO gel Apply small amount to anal opening three times daily for 8 weeks.  Qty: 60 g, Refills: 0    Comments: Compound in liposomal heavy  Associated Diagnoses: Anal fissure      diphenhydrAMINE (BENADRYL) 50 MG capsule Take 1-2 capsules ( mg) by mouth every 6 hours as needed for itching, allergies or sleep  Qty: 90 capsule, Refills: 3    Associated Diagnoses: Mild intermittent asthma without complication      DULoxetine (CYMBALTA) 60 MG EC capsule Take 1 capsule (60 mg) by mouth daily  Qty: 90 capsule, Refills: 3    Associated Diagnoses: Adjustment disorder with mixed anxiety and depressed mood      EPINEPHrine (ANY BX GENERIC EQUIV) 0.3 MG/0.3ML injection 2-pack Inject 0.3 mLs (0.3 mg) into the muscle once as needed for anaphylaxis  Qty: 0.6 mL, Refills: 3    Associated Diagnoses: Bee sting allergy      furosemide (LASIX) 20 MG tablet Take 1 tablet (20 mg) by mouth daily as needed (edema)  Qty: 30 tablet, Refills: 4    Associated Diagnoses: Lymphedema      gabapentin (NEURONTIN) 300 MG capsule Take 1 capsule (300 mg) by mouth 3 times daily  Qty: 270 capsule, Refills: 3    Associated Diagnoses: Chronic bilateral low back pain with left-sided sciatica      guaiFENesin-codeine (ROBITUSSIN AC) 100-10 MG/5ML solution Take 5-10 mLs by mouth nightly as needed for cough  Qty: 118 mL, Refills: 0    Associated Diagnoses: Cough      hyoscyamine (LEVSIN) 0.125 MG tablet       lamoTRIgine (LAMICTAL) 25 MG tablet       lidocaine (XYLOCAINE) 2 % solution Swish and swallow 15 mLs in mouth every 4 hours as needed for other (GERD) ; Max 8 doses/24 hour period. Mix with 15 mLs Maalox or Mylanta.  Qty: 100 mL, Refills: 3     Associated Diagnoses: Gastroesophageal reflux disease without esophagitis      medical cannabis (Patient's own supply) (The purpose of this order is to document that the patient reports taking medical cannabis.  This is not a prescription, and is not used to certify that the patient has a qualifying medical condition.)  Qty: 0 Information only, Refills: 0      medroxyPROGESTERone (DEPO-PROVERA) 150 MG/ML IM injection Inject 1 mL (150 mg) into the muscle every 3 months  Qty: 3 mL, Refills: 3    Associated Diagnoses: Encounter for initial prescription of injectable contraceptive      methocarbamol (ROBAXIN) 750 MG tablet TAKE ONE TABLET BY MOUTH THREE TIMES A DAY AS NEEDED  Qty: 60 tablet, Refills: 5    Associated Diagnoses: Fibromyalgia      methylPREDNISolone (MEDROL DOSEPAK) 4 MG tablet therapy pack Follow Package Directions  Qty: 21 tablet, Refills: 0    Associated Diagnoses: Throat pain; Fever, unspecified fever cause; Cough; Acute bronchitis with coexisting condition requiring prophylactic treatment      metoprolol succinate ER (TOPROL-XL) 50 MG 24 hr tablet Take 1 tablet (50 mg) by mouth daily  Qty: 90 tablet, Refills: 3    Comments: OK to fill early due to loss of medication  Associated Diagnoses: Hypertension goal BP (blood pressure) < 130/80      Multiple Vitamins-Minerals (EQ MULTIVITAMINS ADULT GUMMY) CHEW Take 1 chew tab by mouth daily      naproxen (NAPROSYN) 500 MG tablet Take 1 tablet (500 mg) by mouth 2 times daily (with meals)  Qty: 14 tablet, Refills: 0    Associated Diagnoses: Tension headache      NONFORMULARY Apply 30 mLs topically 4 times daily      oxyCODONE-acetaminophen (PERCOCET) 7.5-325 MG per tablet Take 1 tablet by mouth nightly as needed for severe pain  Qty: 8 tablet, Refills: 0    Associated Diagnoses: Right elbow pain      pantoprazole (PROTONIX) 40 MG EC tablet Take 1 tablet (40 mg) by mouth 2 times daily Take 30-60 minutes before a meal.  Qty: 180 tablet, Refills: 3    Associated  Diagnoses: Gastroesophageal reflux disease without esophagitis      penicillin V (VEETID) 500 MG tablet TAKE 1 TABLET BY MOUTH FOUR TIMES DAILY FOR 7 DAYS      prochlorperazine (COMPAZINE) 10 MG tablet Take 1 tablet (10 mg) by mouth every 6 hours as needed for nausea or vomiting  Qty: 60 tablet, Refills: 11    Associated Diagnoses: Migraine without aura and without status migrainosus, not intractable      rizatriptan (MAXALT-MLT) 5 MG ODT Take 1-2 tablets (5-10 mg) by mouth at onset of headache for migraine May repeat in 2 hours. Max 6 tablets/24 hours.  Qty: 18 tablet, Refills: 3    Associated Diagnoses: Migraine with aura and without status migrainosus, not intractable      valACYclovir (VALTREX) 1000 mg tablet Take 2 tablets (2,000 mg) by mouth 2 times daily for 1 day  Qty: 4 tablet, Refills: 3    Associated Diagnoses: Cold sore                  Consults:   No consultations were requested during this admission          Labs:     Recent Results (from the past 24 hour(s))   CBC with platelets differential    Collection Time: 05/13/21  8:38 PM   Result Value Ref Range    WBC 9.1 4.0 - 11.0 10e9/L    RBC Count 4.00 3.8 - 5.2 10e12/L    Hemoglobin 13.7 11.7 - 15.7 g/dL    Hematocrit 39.5 35.0 - 47.0 %    MCV 99 78 - 100 fl    MCH 34.3 (H) 26.5 - 33.0 pg    MCHC 34.7 31.5 - 36.5 g/dL    RDW 12.3 10.0 - 15.0 %    Platelet Count 267 150 - 450 10e9/L    Diff Method Automated Method     % Neutrophils 77.8 %    % Lymphocytes 14.8 %    % Monocytes 6.9 %    % Eosinophils 0.1 %    % Basophils 0.2 %    % Immature Granulocytes 0.2 %    Nucleated RBCs 0 0 /100    Absolute Neutrophil 7.1 1.6 - 8.3 10e9/L    Absolute Lymphocytes 1.4 0.8 - 5.3 10e9/L    Absolute Monocytes 0.6 0.0 - 1.3 10e9/L    Absolute Eosinophils 0.0 0.0 - 0.7 10e9/L    Absolute Basophils 0.0 0.0 - 0.2 10e9/L    Abs Immature Granulocytes 0.0 0 - 0.4 10e9/L    Absolute Nucleated RBC 0.0    Salicylate level    Collection Time: 05/13/21  8:38 PM   Result Value Ref  "Range    Salicylate Level <2 mg/dL   Basic metabolic panel    Collection Time: 05/13/21  8:38 PM   Result Value Ref Range    Sodium 138 133 - 144 mmol/L    Potassium 4.1 3.4 - 5.3 mmol/L    Chloride 105 94 - 109 mmol/L    Carbon Dioxide 30 20 - 32 mmol/L    Anion Gap 3 3 - 14 mmol/L    Glucose 97 70 - 99 mg/dL    Urea Nitrogen 17 7 - 30 mg/dL    Creatinine 0.76 0.52 - 1.04 mg/dL    GFR Estimate >90 >60 mL/min/[1.73_m2]    GFR Estimate If Black >90 >60 mL/min/[1.73_m2]    Calcium 9.2 8.5 - 10.1 mg/dL   Acetaminophen level    Collection Time: 05/13/21  8:38 PM   Result Value Ref Range    Acetaminophen Level <2 mg/L   Asymptomatic SARS-CoV-2 COVID-19 Virus (Coronavirus) by PCR    Collection Time: 05/13/21 10:35 PM    Specimen: Nasopharyngeal   Result Value Ref Range    SARS-CoV-2 Virus Specimen Source Nasopharyngeal     SARS-CoV-2 PCR Result NEGATIVE     SARS-CoV-2 PCR Comment (Note)    Hepatic panel    Collection Time: 05/14/21  7:52 AM   Result Value Ref Range    Bilirubin Direct 0.1 0.0 - 0.2 mg/dL    Bilirubin Total 0.3 0.2 - 1.3 mg/dL    Albumin 3.5 3.4 - 5.0 g/dL    Protein Total 6.5 (L) 6.8 - 8.8 g/dL    Alkaline Phosphatase 75 40 - 150 U/L    ALT 40 0 - 50 U/L    AST 13 0 - 45 U/L   Lipid panel    Collection Time: 05/14/21  7:52 AM   Result Value Ref Range    Cholesterol 177 <200 mg/dL    Triglycerides 109 <150 mg/dL    HDL Cholesterol 40 (L) >49 mg/dL    LDL Cholesterol Calculated 115 (H) <100 mg/dL    Non HDL Cholesterol 137 (H) <130 mg/dL   TSH with free T4 reflex and/or T3 as indicated    Collection Time: 05/14/21  7:52 AM   Result Value Ref Range    TSH 0.90 0.40 - 4.00 mU/L       LMP  (LMP Unknown)   Weight is 0 lbs 0 oz  There is no height or weight on file to calculate BMI.         Psychiatric Mental Status Examination:   Appearance: awake, alert, adequate groomed  Attitude: cooperative   Eye Contact: good  Mood:  \"better\"  Affect: mood congruent and full range  Speech:  dysarthria 2/2 hearing " impairment,   Language: ASL  Psychomotor Behavior:  no evidence of tardive dyskinesia, dystonia, or tics  Gait/Station: normal  Thought Process:  linear, logical, goal oriented  Associations:  no loose associations  Thought Content:  Denying SI/HI/AVH; no evidence of psychotic thinking  Insight:  fair  Judgement: intact  Oriented to:  time, person, and place  Attention Span and Concentration:  intact  Recent and Remote Memory:  appears limited 2/2 anxiety/psychosis in ED, otherwise intact  Fund of Knowledge: appropriate      Clinical Global Impressions  First:  Considering your total clinical experience with this particular patient population, how severe are the patient's symptoms at this time?: 4 (05/14/21 1109)  Compared to the patient's condition at the START of treatment, this patient's condition is: 2 (05/14/21 1109)  Most recent:  Considering your total clinical experience with this particular patient population, how severe are the patient's symptoms at this time?: 4 (05/14/21 1109)  Compared to the patient's condition at the START of treatment, this patient's condition is: 2 (05/14/21 1109)           Physical Exam:   Please refer to physical exam completed by ED provider, Akila PEACE CNP, on 5/14/21. I agree with the findings and assessment and have no additional findings to add at this time.

## 2021-05-14 NOTE — TELEPHONE ENCOUNTER
S: July DEC, [facility] ed, 31/F, SI    B: Pt BIB EMS after mother called 911  DEC bypassed dt pt needing    Collateral information from mother and chart review  Mother reports that pts depression has increased and pt frequently has erratic behavior and hyperventilates. Pt arrived to the ED having a panic attack, was given ativan and zyprexa. Mother reports that pt has been having full conversations with her father who  14 years ago and states that she will be seeing him soon.   Pt has SA/SIB hx. SIB by cutting.   Pt last hospitalization was in .   Pt has hx at [facility] as an adolescent   Pt has hx of MDD, PTSD, BPD, and psychogenic siezures  Pt has hx of opiate addiction    All Labs ordered  Per chart review pt has hx of seizures (pseudo seizures dx after neur exam)    A: ALYSON-->72hh    R:Carolann  857pm: Provider accepted  904pm: Pt placed in que, unit notified, unit reported they may not be staffed to take pt on nights. Intake following their protocol and procedure.   917pm: [facility] ED updated.   945pm: Unit can take report @ 12 midnight.

## 2021-05-14 NOTE — DISCHARGE INSTRUCTIONS
Behavioral Discharge Planning and Instructions    Summary: You were admitted on 5/14/2021  from the [facility] ER  due to concerns about suicidal ideation. You were placed on a 72 hour hold.  You were treated by Dr. Vazquez.  You were assessed using an . You were assessed as not being at risk of harming yourself and were granted the discharge that you were requesting. You were discharged on 5/14/21 from [facility]. You mother transported you home.      Main Diagnosis: .   Unspecified psychosis (Brief psychotic disorder vs. Psychotic features of depression vs adjustment disorder with disturbance in emotion and conduct)  Major Depressive Disorder, recurrent, moderate r/o psychotic features  Generalized anxiety disorder   Borderline Personality Disorder  History of Psychogenic Non-Epileptiform Seizures  Opiate use disorder, severe, in remission   History of alcohol abuse  Crohn's disease  GERD  HTN  Hx of C Diff  Mild intermittent asthma  Chronic pain         Health Care Follow-up:   You have Medicare for  Health insurance.      You had this virtual appointment scheduled prior to admission.  May17 Telephone Visit with Junaid Pritchett PA-C  Monday May 17, 2021 8:00 AM  Please Note: This is a virtual visit; there is no need to come to the facility.    [facility]  [phone]         You meet with your therapist every Tuesday. Currently this is by video call.  [facility].  Evan Ferris        We scheduled an in-person  hospital follow up appointment with your primary care provider to talk to hr about your rent symptoms which seemed to be more of physical health in nature.  May19 Office Visit with Aura Rosario PA-C  Wednesday May 19, 2021 10:30 AM  Bring a current list of meds and any records pertaining to this visit.  For Physicals, please bring immunization records and any forms needing to be filled  out. Please arrive 10 minutes early to complete paperwork. [address]   [facility] [address] [phone]           Attend all scheduled appointments with your outpatient providers. Call at least 24 hours in advance if you need to reschedule an appointment to ensure continued access to your outpatient providers.     Major Treatments, Procedures and Findings:  You were provided with: a psychiatric assessment, assessed for medical stability, medication evaluation and/or management, milieu management and medical interventions      Your Covid-19 test was negative.  SARS-CoV-2 PCR Result 05/13/2021 10:35 PM formerly [facility]   NEGATIVE    Comment:   SARS-CoV2 (COVID-19) RNA not detected, presumed negative.         Talk to your doctor about your cholesterol.  Cholesterol 177   <200 mg/dL Final 05/14/2021  7:52 AM 13   Triglycerides 109   <150 mg/dL Final 05/14/2021  7:52 AM 13   HDL Cholesterol 40  Low   >49 mg/dL Final 05/14/2021  7:52 AM 13   LDL Cholesterol Calculated 115  High   <100 mg/dL Final 05/14/2021  7:52 AM 13   Comment:   Above desirable:  100-129 mg/dl   Borderline High:  130-159 mg/dL   High:             160-189 mg/dL   Very high:       >189 mg/dl    Non HDL Cholesterol 137  High   <130 mg/dL Final 05/14/2021  7:52 AM 13   Comment:   Above Desirable:  130-159 mg/dl   Borderline high:  160-189 mg/dl   High:             190-219 mg/dl   Very high:       >219 mg/dl        Symptoms to Report: mood getting worse or thoughts of suicide    Early warning signs can include: increased thoughts or behaviors of suicide or self-harm     Safety and Wellness:  Take all medicines as directed.  Make no changes unless your doctor suggests them.      Follow treatment recommendations.  Refrain from alcohol and non-prescribed drugs.  If there is a concern for safety, call 911.    Resources:   [employer] [place] [employer] Mental Illness  (www.mn.geoff.org): 494.383.4303 or 025-337-7039.    [facility]  Crisis Line for [facility] -  [phone]        [facility] Adult Mental Health Case Management  Phone: [phone] or [phone]        General Medication Instructions:   See your medication sheet(s) for instructions.   Take all medicines as directed.  Make no changes unless your doctor suggests them.   Go to all your doctor visits.  Be sure to have all your required lab tests. This way, your medicines can be refilled on time.  Do not use any drugs not prescribed by your doctor.  Avoid alcohol.    Advance Directives:   Scanned document on file with [employer]? No scanned doc  Is document scanned? Pt states no documents  Honoring Choices Your Rights Handout: Informed and given  Was more information offered? Materials given    The Treatment team has appreciated the opportunity to work with you. If you have any questions or concerns about your recent admission, you can contact the unit which can receive your call 24 hours a day, 7 days a week. They will be able to get in touch with a Provider if needed. The unit number is [phone] .

## 2021-05-14 NOTE — PROGRESS NOTES
"Assessment & Plan     ICD-10-CM    1. Mild major depression (H)  F32.0 MENTAL HEALTH REFERRAL  - Adult; Psychiatry; Psychiatry; [facility]: Psychiatry Clinic [phone]; We will contact you to schedule the appointment or please call with any questions   2. JULIETH (generalized anxiety disorder)  F41.1 MENTAL HEALTH REFERRAL  - Adult; Psychiatry; Psychiatry; [facility]: Psychiatry Clinic [phone]; We will contact you to schedule the appointment or please call with any questions   3. Borderline personality disorder (H)  F60.3 MENTAL HEALTH REFERRAL  - Adult; Psychiatry; Psychiatry; [facility]: Psychiatry Clinic [phone]; We will contact you to schedule the appointment or please call with any questions   4. Bipolar disorder, current episode mixed, severe, without psychotic features (H)  F31.63 MENTAL HEALTH REFERRAL  - Adult; Psychiatry; Psychiatry; [facility]: Psychiatry Clinic [phone]; We will contact you to schedule the appointment or please call with any questions   5. Hypertension goal BP (blood pressure) < 130/80  I10    6. Bee sting allergy  Z91.030 EPINEPHrine (ANY BX GENERIC EQUIV) 0.3 MG/0.3ML injection 2-pack   7. Encounter for surveillance of injectable contraceptive  Z30.42 HCG Qual, Urine ([id number])   8. Birth elliot  Q82.5      1-4. Mood   - Patient had symptoms thought she was having stroke or heart attack, remembers little after that, was very aggitated in ED and was placed on 72 hour hold, however once a bed was found for her she was back to baseline and no longer met criteria, she was discharged   - Patient reports mood is \"fine\" and doing well on current regimen, reports medication compliance      Mom confirms patient has been taking medication since she has been back in state with her   - Continues counseling weekly   - [facility] refer to [facility] for [facility] evaluation and care      Patient is in agreement with this plan   - Discussed as out patient PCP I am not able to fully care for her mental health needs, needs " psychiatry, she is in agreement   - Also discussed possibly needing MRI of brain due to this apparent sudden mental breakdown      Recommend we wait on this until MRI of abdomen done for her Crohn's and possibly until seen by psychiatry     Will also discuss with her counselor     5. HTN   - Patient appears to get obsessive about her BP and pulse, checking often 3+ times a day   - Recommend she only check once a day, check when at rest for 5-10 minutes, and check when most calm   - Discussed that BP is supposed to increase with anxiety, stress, illness, etc.   - Previously, we were dealing with low BPs, so at this point I am very hesitant to make any changes to her metoprolol   - Reviewed medication use and side effects     6. Reviewed epi-pen use, refilled     7. Depo   - Going well for patient, but is over due, will get pregnancy test, once back negative will give her depo shot     8. Rash/birth elliot   - Mom states this has been there since birth, patient thinks is getting bigger and often burns with sweat   - Discussed appearance not of something infectious likely a dermatitis or birth elloit   - Can try her at home steroid cream, light layer twice a day to see if improves (has not done this yet despite us discussing at last visit)   - Not consistent with malignancy or shingles  - Monitor       Review of the result(s) of each unique test - See list       PHQ9 & GAD7   Diagnosis or treatment significantly limited by social determinants of health - Depression     40 minutes spent on the date of the encounter doing chart review, history and exam, documentation and further activities as noted above    The patient and her mom indicates understanding of these issues and agrees with the plan.    Due to language barrier, an  was present during the history-taking and subsequent discussion (and for part of the physical exam) with this patient.    LOVE [facility]  "SÁNCHEZ Burns is a 31 year old who presents for the following health issues with her mom and interpretor     [facility]     Hospital Follow-up Visit:    Hospital/Nursing Home/IP Rehab Facility: [facility]  Date of Admission: 5/14/21  Date of Discharge: 5/14/21  Reason(s) for Admission: 72 hour mental health hold       Was your hospitalization related to COVID-19? No   Problems taking medications regularly:  None  Medication changes since discharge: None  Problems adhering to non-medication therapy:  None    Summary of hospitalization:  [facility] discharge summary reviewed  Diagnostic Tests/Treatments reviewed.  Follow up needed: Mental health provider - psychiatry   Other Healthcare Providers Involved in Patient s Care:         Specialist appointment - Counseling, psychiatry   Update since discharge: improved.       - Mood      Met with counseling still      Solange suggested full psychiatry eval      [facility]      Also wanted a scan of her brain due to this behavior not normal for her      Reports medication compliance     - Blood pressure      \"good\" at home       145/104, after medication, took at 7 am, then BP at 9     - Rash       Hurts when sweating, burns       Comes and goes       Does not itch       Getting bigger       Mom states the same as when born, patient states is bigger     - Neck/back      2 herniated discs in neck, hurts but getting by      [facility] appointment next week (5/25/21)     - Needs her depo shot, is overdue     - Had apt with GI specialist, symptoms continue to come and go   - They are getting abdominal MRI for her and if no answers there will be getting endoscopy     Post Discharge Medication Reconciliation: discharge medications reconciled, continue medications without change.  Plan of care communicated with patient          PHQ 10/8/2020 1/5/2021 5/12/2021   PHQ-9 Total Score - 2 9   Q9: Thoughts of better off dead/self-harm " past 2 weeks - Not at all Not at all   PHQ-9 External Data 4 - -     JULIETH-7 SCORE 4/23/2020 1/5/2021 5/12/2021   Total Score - - -   Total Score - - 9 (mild anxiety)   Total Score 12 10 9       Review of Systems   Constitutional, HEENT, cardiovascular, pulmonary, gi, MSK, neurological, psychiatric, and gu systems are negative, except as otherwise noted.      Objective    /70   Pulse 106   Temp 97.4  F (36.3  C) (Temporal)   Wt 84.4 kg (186 lb)   LMP  (LMP Unknown)   SpO2 100%   BMI 34.02 kg/m    Body mass index is 34.02 kg/m .  Physical Exam   GENERAL APPEARANCE: healthy, alert and no distress  EYES: Eyes grossly normal to inspection, PERRLA, conjunctivae and sclerae without injection or discharge, EOM intact   RESP: Lungs clear to auscultation - no rales, rhonchi or wheezes    CV: Regular rates and rhythm, normal S1 S2, no S3 or S4, no murmur, click or rub, no peripheral edema and peripheral pulses strong and symmetric bilaterally   MS: No musculoskeletal defects are noted and gait is age appropriate without ataxia   SKIN:   Back of neck - large flat pink birth elliot with regular borders but irregular shaped mainly under hairline, no signs of infection or excoriation   No suspicious lesions or rashes, hydration status appears adeuqate with normal skin turgor   PSYCH: Alert and oriented x3; speech- coherent , normal rate and volume; able to articulate logical thoughts, able to abstract reason, no tangential thoughts, no hallucinations or delusions, mentation appears normal, Mood is euthymic. Affect is appropriate for this mood state and bright. Thought content is free of suicidal ideation, hallucinations, and delusions. Dress is adequate and upkept. Eye contact is good during conversation.       Diagnostics  Reviewed in Epic  See orders pending in Epic

## 2021-05-14 NOTE — PLAN OF CARE
"  The patient and/or their representative will achieve their patient-specific goals related to the plan of care.    The patient-specific goals include:     \"Reasons you are in the hospital;\" The patient identifies the following reasons for current hospitalization:   \"something happened and I just felt really funny and then felt numb and I felt a lump qand I have a spinal cord stimulator and I staryted to get upset and started talking to other people that were not there, it wasn't a seizure but maybe, maybe headaches or post covid syndrome\"    \"Goals for Discharge\" The patient identifies the following goals for discharge:  \"just see why I was acting that way\"    "

## 2021-05-14 NOTE — PLAN OF CARE
Met with pt discharge meeting conducted through .  Pt given copy of her discharge instructions and medication administration instructions. All discharge plans were discussed with patient through . Pt reports no questions at this time regarding discharge plans or medications. Pt denies any suicidal ideation, plans or intent at this time. Pt will be picked up by her mother at 2 pm.  Pt has received all her belongings.

## 2021-05-14 NOTE — PROGRESS NOTES
"DEC  Andrés Hahn obtained collateral information for patients mother around 5:35pm. At that time,  was unable to meet with patient as it appear patient was given medication during her ED visit due to patients presentation of a panic attack, and therefore, assessment was not appropriate to provide.  Andrés Hahn encouraged medical provider to requested DEC Assessment once patient was awake and able to engage in assessment.      Per Andrés Heck note \"This writer spoke with the patient's mother (Aysha Islas) for collateral information. The patient has a long standing history of mental health issues such as depression, borderline personality disorder, PTSD, and psychogenic seizures. The patient is deaf and needs a . The patients mother lives out of state and has been texting with her daughter. She reports in the last couple of weeks the patient has been seemingly more depressed. It has been reported she has been observed to be having a conversation with her father who passed away about 14 years ago. The patient has been making increased threats about suicide and mother has been concerned. She has recently come to [place] to check in with her daughter. Today she observed the patient to be acting erratically, shaking and hyperventilating. The patient was having a conversation with her father, who passed away, and telling him \"I will see you soon dad.\" Collateral noted patient has a history of suicide attempts and self injurious behaviors.     Collateral stated patient has a history of multiple psychiatric hospitalizations in the past. The last hospitalization occurred in 2014 at [facility]. During her adolescents the patient was hospitalized on several occasions at [facility] for suicide attempts by cutting her wrists. According to collateral the patient has a history of opiate addition. Her mother expresses concerns as patient lives alone in a mobile home in " [place]. Collateral is afraid to go out of town and leave patient in the current condition.\"       GUNJAN Tolentino, RADHA, AAKASHC was requested to meet with patient at 7:30pm.  spoke with Akila Tamayo APRN CNP, whom noted patient needed a . Provider noted patient was able and willing to engage in assessment.   attempted to meet with patient via cart. Patient had  in room via cart as well.  Due to  not being physically present with patient while assessment was attempting to take place, assessment was bypassed as  and  were unable to hear one another to effectively assess and provide care for patient.  July Rosales spoke with on call clinical supervisor Cathy Sheffield whom was educated on case and was informed of the inability to provide the assessment. Cathy Sheffield was in agreement to bypass assessment due to the collateral and on staff provider concerns for patients mental health and exhibiting symptoms. Clinical Supervisor and  July Gunderson called central intake on this date and requested in-patient bed admission. On staff provider Akila Tamayo APRN CNP was in agreement with in-patient admission as well. Nurse Notified of central intake updates and request for labs.     On staff nurse noted no awareness of current self injurious behaviors. On staff nurse reported patient has not had aggressive behaviors towards staff on this date. Per medical records and per collateral, patient appears to have had suicidal ideation and possibly auditory/ visual hallucinations.     GUNJAN Tolentino LADC, SHARONA    "

## 2021-05-14 NOTE — ED NOTES
"Patient was screaming and could be heard across the entire department. She threw the titus stand across the room and refused to look at  computer. This RN had to remove my mask so patient could read lips. She is uncooperative and continues hollering/screaming. \"get my mother now or I'm leaving\". \"you have no right to hold me. I'm getting a \". Jeremy T updated.   "
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
## 2021-05-14 NOTE — PROGRESS NOTES
05/14/21 0603   Patient Belongings   Did you bring any home meds/supplements to the hospital?  No   Patient Belongings locker   Patient Belongings Put in Hospital Secure Location (Security or Locker, etc.) cell phone/electronics;clothing;shoes   Belongings Search Yes   Clothing Search Yes   Second Staff Maya ROSS RN and Sheryl HA RN   Comment Envelope # [id number] sent to security     Items kept in storage  Shorts, Undershirt,  Shoes, Cell phone with Credit Card salcedo, Lotion.    Items sent to security  [place] Drivers License, [employer] Visa, [employer] card, [employer] card, Care Credit, AAA card, Venmo Card.    Admission Signature    Patient Signature    ________________    Staff Signature    _________________    2nd Staff if patient can't sign    _______________    Discharge Signature    All my personal items were returned to me    Patient Signature    _________________    Staff Signature    _________________

## 2021-05-14 NOTE — PLAN OF CARE
"  Problem: Behavioral Health Plan of Care  Goal: Plan of Care Review  Recent Flowsheet Documentation  Taken 5/14/2021 0600 by Maya Pettit RN  Plan of Care Reviewed With: patient  Patient Agreement with Plan of Care: (pt states she is not suicidal and should not be here) disagrees (describe)       Patient was admitted on a 72 HH from [facility] ED for suicidal behavior and psychotic symptoms. Pt is deaf and  was not present so interview was not conducted at this time. Pt states she has no intent to harm herself and is not suicidal upon admission.  Per chart review pt has been making statements to her mother that she is talking to her father (who has been dead for many years) and stating she will \"see him soon.\" Mom concerned and brought pt to ED and she had a panic attack when she arrived, pt very agitated and was throwing things and combative, minimally cooperative with assessment in ED. COVID negative and UTOX not collected at time of admission.     Pt did not notify anyone of her admission to the unit.     Mental Health History: Per chart review pt has a hx of MDD, PTSD, BPD, and pseudo seizures. Also a hx of SA and SIB via cutting as well as opiate addiction. Last hospitalization was 2014 and has been at  as an adolescent.       Medical History: Per pt she has a lump on her back that started two days ago, her hair is falling out and she has a rash on the back of her neck, also reporting back and leg pain as well as weakness in her legs. Able to ambulate independently but slightly unsteady when first getting off stretcher. Per chart review pt has a spinal fusion and neurostimulater implanted.       Plan: Patient was overall cooperative with search and admission process, changed into scrubs and allowed search and then went to her room. Patient appears flat and blunted, fairly polite upon interaction with writer and agreeable to admit process with the understanding she would have an "  later in the day. Orders placed at direction of on call provider. Patient was placed on suicide, fall and SIB precautions. Patient is code 1 status 15. Will encourage patient to complete safety plan and participate in groups. Will encourage patient to take prescribed medications.

## 2021-05-14 NOTE — PLAN OF CARE
"BEHAVIORAL TEAM DISCUSSION    Participants:   Jerri Ortiz RN, Anaya DONG, Dr. Vazquez    Progress:   Pt was brought to the [facility] ED by mother.   Per chart review pt has been making statements to her mother that she is talking to her father (who has been dead for many years) and stating she will \"see him soon.\"   However pt is denying suicidal ideation.      Anticipated length of stay:   1 day    Continued Stay Criteria/Rationale:   The pt was evaluated as not needing continued hospitalization.    Medical/Physical:   The chart review shows a large number of medical problems.          Precautions:   Behavioral Orders   Procedures     Code 1 - Restrict to Unit     Fall precautions     Routine Programming     As clinically indicated     Self Injury Precaution     Status 15     Every 15 minutes.     Suicide precautions     Patients on Suicide Precautions should have a Combination Diet ordered that includes a Diet selection(s) AND a Behavioral Tray selection for Safe Tray - with utensils, or Safe Tray - NO utensils       Plan:   Multidisciplinary evaluation  Medication Management - no medication changes  Discharge to home today  Follow up[ with primary care      Rationale for change in precautions or plan: initial review        "

## 2021-05-17 ENCOUNTER — MYC MEDICAL ADVICE (OUTPATIENT)
Dept: FAMILY MEDICINE | Facility: OTHER | Age: 31
End: 2021-05-17

## 2021-05-17 ENCOUNTER — MYC REFILL (OUTPATIENT)
Dept: FAMILY MEDICINE | Facility: OTHER | Age: 31
End: 2021-05-17

## 2021-05-17 ENCOUNTER — TELEPHONE (OUTPATIENT)
Dept: FAMILY MEDICINE | Facility: OTHER | Age: 31
End: 2021-05-17

## 2021-05-17 ENCOUNTER — PATIENT OUTREACH (OUTPATIENT)
Dept: CARE COORDINATION | Facility: CLINIC | Age: 31
End: 2021-05-17

## 2021-05-17 ENCOUNTER — VIRTUAL VISIT (OUTPATIENT)
Dept: GASTROENTEROLOGY | Facility: CLINIC | Age: 31
End: 2021-05-17
Attending: PHYSICIAN ASSISTANT
Payer: MEDICARE

## 2021-05-17 ENCOUNTER — MYC MEDICAL ADVICE (OUTPATIENT)
Dept: GASTROENTEROLOGY | Facility: CLINIC | Age: 31
End: 2021-05-17

## 2021-05-17 DIAGNOSIS — K21.9 GASTROESOPHAGEAL REFLUX DISEASE WITHOUT ESOPHAGITIS: ICD-10-CM

## 2021-05-17 DIAGNOSIS — G89.29 CHRONIC BILATERAL LOW BACK PAIN WITH LEFT-SIDED SCIATICA: ICD-10-CM

## 2021-05-17 DIAGNOSIS — F32.0 MILD MAJOR DEPRESSION (H): ICD-10-CM

## 2021-05-17 DIAGNOSIS — R19.7 DIARRHEA, UNSPECIFIED TYPE: ICD-10-CM

## 2021-05-17 DIAGNOSIS — F41.9 ANXIETY: ICD-10-CM

## 2021-05-17 DIAGNOSIS — Z71.89 OTHER SPECIFIED COUNSELING: ICD-10-CM

## 2021-05-17 DIAGNOSIS — M54.42 CHRONIC BILATERAL LOW BACK PAIN WITH LEFT-SIDED SCIATICA: ICD-10-CM

## 2021-05-17 DIAGNOSIS — K50.113 CROHN'S DISEASE OF COLON WITH FISTULA (H): ICD-10-CM

## 2021-05-17 PROCEDURE — 99442 PR PHYSICIAN TELEPHONE EVALUATION 11-20 MIN: CPT | Mod: 95 | Performed by: PHYSICIAN ASSISTANT

## 2021-05-17 RX ORDER — CLONAZEPAM 1 MG/1
1 TABLET ORAL 2 TIMES DAILY PRN
Qty: 60 TABLET | Refills: 2 | Status: SHIPPED | OUTPATIENT
Start: 2021-05-21 | End: 2021-07-26

## 2021-05-17 RX ORDER — CYCLOBENZAPRINE HCL 10 MG
10 TABLET ORAL 3 TIMES DAILY PRN
Qty: 90 TABLET | Refills: 3 | Status: SHIPPED | OUTPATIENT
Start: 2021-05-17 | End: 2021-07-26

## 2021-05-17 RX ORDER — CYCLOBENZAPRINE HCL 10 MG
10 TABLET ORAL 3 TIMES DAILY PRN
Qty: 90 TABLET | Refills: 3 | Status: CANCELLED | OUTPATIENT
Start: 2021-05-17

## 2021-05-17 NOTE — TELEPHONE ENCOUNTER
Her BP is high because she is anxious, worried, and in pain. She needs to remember we only make medication changes when BP is consistently elevated. I am very hesitant to make any changes because just last week we were talking about how her BP is LOW (she was 94/60 in clinic).     She likely doesn't remember what happened in the ED because she was so extremely agitated and anxious while she was there. She should try not to worry about this.     Dermatology is booked several months out and her rash doesn't warrant a referral at this point. I will address her rash and her ear on Wednesday (she is scheduled for 60 min, 90 min is the interpretor)     Zach Tomlinson-LOVE Moss  ealth [facility]

## 2021-05-17 NOTE — PROGRESS NOTES
Clinic Care Coordination Contact    Situation: Patient chart reviewed by care coordinator.    Background: pt had declined care coordination 11 days ago when offered.  Pt was back to [facility] and then transferred to [facility] for behavioral health.    Assessment: pt was discharged on same day as admit to [facility].  Pt was assessed to not be in danger to self or others and discharged.  She does see a therapist every Tuesday.    Plan/Recommendations: will send contact information for any potential needs.  At this time will not call out as pt recently declined care coordination.     DOYLE Montes De Oca, [facility] - Care Coordinator   [facility]  5/17/2021   10:14 AM  [phone]

## 2021-05-17 NOTE — PATIENT INSTRUCTIONS
It was a pleasure visiting with you today.     Please make a lab appointment to collect stool kits. You can call [phone] to schedule at any convenient [facility] location. We will either call you or send you a My Chart message with your results.     Please schedule your MRI tests. If you have not heard from the scheduling office within 2 business days, please call [phone] for [place] or [phone] for any other [facility] to schedule.       Junaid Pritchett PA-C  Gastroenterology  [facility] - [place]

## 2021-05-17 NOTE — PROGRESS NOTES
Clinic Care Coordination Contact  Community Health Worker Initial Outreach          The CHW reviewed the chart and at this time the patient has declined CCC twice in the past on 5/6/21 and on 4/21/21. The CHW will send the referral to the Clinical team to review and determine if outreach is appropriate.

## 2021-05-17 NOTE — TELEPHONE ENCOUNTER
reviewed. Last fill 4/22/21 for #60. Can not fill again until Friday 5/21/21.     Rx e-prescribed. Please notify patient.    Zach Rosario PA-C  [facility]

## 2021-05-17 NOTE — TELEPHONE ENCOUNTER
This nurse called to discuss. The patient states that she understands the message below. We talked about her ear and due to having a ruptured eardrum it can be normal to have dried blood in her ear and clear drainage coming out. We re-evaluate Wednesday.     Jose Finney RN, BSN  [place]  May 17, 2021

## 2021-05-17 NOTE — PROGRESS NOTES
Katy is a 31 year old who is being evaluated via a billable telephone visit.      What phone number would you like to be contacted at? [phone]  How would you like to obtain your AVS? [facility]        GASTROENTEROLOGY FOLLOW UP TELEPHONE VISIT    CC/REFERRING MD:    Aura Rosario    REASON FOR CONSULTATION:   Referred by Aura Rosario for RECHECK (Crohn's disease of colon with fistula/ C Diff/ GERD/ cheek and neck rash/ pantoprezole not working so well.)      HISTORY OF PRESENT ILLNESS:    Katy Islas is 31 year old female who presents via telephone visit with  services for several GI concerns.     She has concerns with rectal bleeding and lower abdominal pain. She also has diarrhea off and on for the past month. She has actually had these symptoms off and on for quite some time. She reports having a history of crohn's disease. This was diagnosed at the age of 10 or 11. She was previously on medications including 6MP, prednisone, remicade and occ abx (cipro and flagyl). She however has not been on medications for a number of years. Her last several colonoscopies have been unrevealing. Her last colonoscopy in 2019 was without any signs of IBD. She did have internal hemorrhoids which were thought to be the source of her rectal bleeding at that time. She did however have a perianal abscess and anal fistula in March and April of 2020 which was treated by colorectal.  She tells me that she is still having an issue with drainage from perianal area. She has also developed mouth sores and rash behind her neck which she is concerned could be related to her crohn's disease.    She also has concerns regarding her heartburn. She notes significant symptoms despite protonix 40mg twice daily. Symptoms can trigger vomiting at times. Her previous work up includes an upper endoscopy in 019 which was overall unremarkable.     She reports new family  hx: her maternal uncle was recently diagnosed with colon cancer at approx age of 65.       PMHx, PSHx, Social Hx, Family Hx have been reviewed.       PERTINENT STUDIES: (I personally reviewed these laboratory studies today)  Most recent CBC:   Recent Labs   Lab Test 05/13/21 2038 05/05/21  1549   WBC 9.1 5.4   HGB 13.7 14.5   HCT 39.5 41.5    262     Most recent hepatic panel:  Recent Labs   Lab Test 05/14/21  0752 05/05/21  1549   ALT 40 94*   AST 13 37     Most recent creatinine:  Recent Labs   Lab Test 05/13/21 2038 05/05/21  1549   CR 0.76 0.83       TSH   Date Value Ref Range Status   05/14/2021 0.90 0.40 - 4.00 mU/L Final         ASSESSMENT/PLAN:    1. Crohn's disease of colon with fistula (H)  2. Diarrhea, unspecified type  3. Gastroesophageal reflux disease without esophagitis      Katy Islas is a 31 year old female who presents for follow up. She is currently experiencing rectal bleeding, lower abd pain and intermittent diarrhea. She has actually had these symptoms off and on for quite some time. She does have a hx of crohn's disease which was diagnosed at the age of 10 or 11. She has been off of crohn's therapy for a number of years and her last colonoscopy in 2019 was without any signs of IBD. She however did develop a perirectal abscess and fistula last year in 2020 which could possibly be related to crohn's. She also has concerns with mouth sores and rash to back of her neck. Unsure if this is related to her hx of crohn's vs other pathology.  Recommended we check stool studies to r/o infectious etiology to her symptoms. Will update MR Pelvis to reassess fistula and will also check an MRE to r/o any small bowel involvement. Stool studies will also include fecal calpro level. If any concerning findings we can consider repeat colonoscopy. If our work up unrevealing she should follow up with dermatology in regards to mouth sores and rash to back of neck.     In regards to heartburn,  recommended she continue with protonix as prescribed by her primary. Can add in pepcid for as needed 1-2 times a day.     Orders placed this encounter:   - MR Enterography wo and w Contrast; Future  - MR Pelvis (Intrapelvic Organs) wo Contrast; Future  - Calprotectin Feces; Future  - Clostridium difficile toxin B PCR; Future  - Cryptosporidium/Giardia Immunoassay; Future  - Enteric Bacteria and Virus Panel by [facility] Stool; Future  - Ova and Parasite Exam Routine; Future      Thank you for this consultation.  It was a pleasure to participate in the care of this patient; please contact us with any further questions.    This note was created with voice recognition software, and while reviewed for accuracy, typos may remain.       Junaid Pritchett PA-C  Gastroenterology  [facility]     Phone call duration: 15 minutes

## 2021-05-17 NOTE — TELEPHONE ENCOUNTER
Zach, patient is also requesting her Flexeril. Please review and advise refill.     Patient understood why she could not take 3 tablets of the clonazepam.     Jose Finney RN, BSN  [place]  May 17, 2021

## 2021-05-17 NOTE — TELEPHONE ENCOUNTER
Zach, please review and advise.     Spoke with patient. She stated that she was in the ED last week. When she was in the Ambulance her BP was above 200 systolic. She was terrified about what happened when in the ER because she does not remember most of it. Patient stated that her BP today has been 145/104 with a pulse of 92. She does not feel weak or dizzy. She does not mention any headaches. She is wondering if she should increase the Metoprolol? She mentioned increasing Losartan. However, I do not see that current on her medication list.     Patient is requesting a referral to dermatology.   Patient's visit on Wednesday is scheduled for 90 minutes.     Patient also sent another [employer] message. The message is below. This nurse did send another message back to gather more information.     Jose, I forgot to mention to you that might be important info-  my left ear has been hurting lately- thought it was from my neck.  The pain and vertigo on Friday was bad.  If I lightly nod my head or shake a bit, it made my brain feel very weird.  Almost like vertigo.  A few days ago, my left ear started to leak clear fluid.  I have never had this before.  Is this concerning? Talk to you soon.      Jose Finney RN, BSN  [place] [place]  May 17, 2021

## 2021-05-17 NOTE — TELEPHONE ENCOUNTER
Photos reviewed and discussed during today's telephone visit.     Junaid Pritchett PA-C  Gastroenterology  [facility]

## 2021-05-17 NOTE — TELEPHONE ENCOUNTER
Patient calling using . She said she was in the hospital the end of last week. She said she does not remember what all happened at that hospital stay. Patient said today she feels very anxious. Had a hard time sleeping last night. She said she took her BP this morning and it was 142/104. This reading was 2 hours after patient took her Metoprolol. Patient has a mild headache but otherwise denies all symptoms. She said the hospital told her she had a bad panic attack when she went in. Patient does not think it was a panic attack. She said she is feeling very anxious and tingling/throbbing throughout her body.     Patient has a follow up appointment scheduled with PCP for Wednesday. She is wondering if she can take her Klonopin TID instead of her prescribed BID. She said she needs help getting her anxiety to calm down.     Will route to PCP to review.     EDYTA ChatterjeeN, RN  [facility]

## 2021-05-17 NOTE — TELEPHONE ENCOUNTER
No, Klonopin can't be taken TID because of how long it lasts in the body. It is only dosed BID. Continue with plan for recheck on Wednesday.     Recommend she talk to her counselor about her anxiety.     Zach Rosario PA-C  [facility]

## 2021-05-19 ENCOUNTER — OFFICE VISIT (OUTPATIENT)
Dept: FAMILY MEDICINE | Facility: OTHER | Age: 31
End: 2021-05-19
Payer: MEDICARE

## 2021-05-19 VITALS
BODY MASS INDEX: 34.02 KG/M2 | DIASTOLIC BLOOD PRESSURE: 70 MMHG | OXYGEN SATURATION: 100 % | SYSTOLIC BLOOD PRESSURE: 118 MMHG | TEMPERATURE: 97.4 F | WEIGHT: 186 LBS | HEART RATE: 106 BPM

## 2021-05-19 DIAGNOSIS — Z30.42 ENCOUNTER FOR SURVEILLANCE OF INJECTABLE CONTRACEPTIVE: ICD-10-CM

## 2021-05-19 DIAGNOSIS — F31.63 BIPOLAR DISORDER, CURRENT EPISODE MIXED, SEVERE, WITHOUT PSYCHOTIC FEATURES (H): ICD-10-CM

## 2021-05-19 DIAGNOSIS — Q82.5 BIRTH MARK: ICD-10-CM

## 2021-05-19 DIAGNOSIS — F41.1 GAD (GENERALIZED ANXIETY DISORDER): ICD-10-CM

## 2021-05-19 DIAGNOSIS — Z91.030 BEE STING ALLERGY: ICD-10-CM

## 2021-05-19 DIAGNOSIS — F60.3 BORDERLINE PERSONALITY DISORDER (H): ICD-10-CM

## 2021-05-19 DIAGNOSIS — I10 HYPERTENSION GOAL BP (BLOOD PRESSURE) < 130/80: ICD-10-CM

## 2021-05-19 DIAGNOSIS — F32.0 MILD MAJOR DEPRESSION (H): Primary | ICD-10-CM

## 2021-05-19 LAB — HCG UR QL: NEGATIVE

## 2021-05-19 PROCEDURE — 96372 THER/PROPH/DIAG INJ SC/IM: CPT | Performed by: PHYSICIAN ASSISTANT

## 2021-05-19 PROCEDURE — 81025 URINE PREGNANCY TEST: CPT | Performed by: PHYSICIAN ASSISTANT

## 2021-05-19 PROCEDURE — 99215 OFFICE O/P EST HI 40 MIN: CPT | Mod: 25 | Performed by: PHYSICIAN ASSISTANT

## 2021-05-19 RX ORDER — EPINEPHRINE 0.3 MG/.3ML
0.3 INJECTION SUBCUTANEOUS
Qty: 0.6 ML | Refills: 3 | Status: SHIPPED | OUTPATIENT
Start: 2021-05-19 | End: 2021-07-26

## 2021-05-19 RX ADMIN — MEDROXYPROGESTERONE ACETATE 150 MG: 150 INJECTION, SUSPENSION INTRAMUSCULAR at 11:44

## 2021-05-19 NOTE — PROGRESS NOTES
Clinic Administered Medication Documentation      Depo Provera Documentation    URINE HCG: negative    Depo-Provera Standing Order inclusion/exclusion criteria reviewed.   Patient meets: inclusion criteria     BP: 118/70  LAST PAP/EXAM:   Lab Results   Component Value Date    PAP NIL 01/16/2020       Prior to injection, verified patient identity using patient's name and date of birth. Medication was administered. Please see MAR and medication order for additional information.     Was entire vial of medication used? Yes  Vial/Syringe: Single dose vial  Expiration Date:  08/2022    Patient instructed to remain in clinic for 15 minutes and report any adverse reaction to staff immediately .  NEXT INJECTION DUE: 8/4/21 - 8/18/21

## 2021-05-22 ENCOUNTER — NURSE TRIAGE (OUTPATIENT)
Dept: NURSING | Facility: CLINIC | Age: 31
End: 2021-05-22

## 2021-05-23 NOTE — TELEPHONE ENCOUNTER
Patient calling with an . Patient states she is deaf. Patient calling reporting she has a low blood pressure of 104/42. States her heart  Is beating fast and her heart rate is 132. States she feels weak. Per guideline, advised patient to call 911. Patient states she cannot afford an ambulance and will call someone.     Brendan Villalba RN  [facility]     COVID 19 Nurse Triage Plan/Patient Instructions    Please be aware that novel coronavirus (COVID-19) may be circulating in the community. If you develop symptoms such as fever, cough, or SOB or if you have concerns about the presence of another infection including coronavirus (COVID-19), please contact your health care provider or visit [email or web address].     Disposition/Instructions    Call to EMS/911 recommended. Follow protocol based instructions.     Bring Your Own Device:  Please also bring your smart device(s) (smart phones, tablets, laptops) and their charging cables for your personal use and to communicate with your care team during your visit.    Thank you for taking steps to prevent the spread of this virus.  o Limit your contact with others.  o Wear a simple mask to cover your cough.  o Wash your hands well and often.    Resources    [facility]: About COVID-19: [email or web address]    CDC: What to Do If You're Sick: www.cdc.gov/coronavirus/2019-ncov/about/steps-when-sick.html    CDC: Ending Home Isolation: www.cdc.gov/coronavirus/2019-ncov/hcp/disposition-in-home-patients.html     CDC: Caring for Someone: www.cdc.gov/coronavirus/2019-ncov/if-you-are-sick/care-for-someone.html     [facility]: Interim Guidance for Hospital Discharge to Home: www.health.[facility].mn.us/diseases/coronavirus/hcp/hospdischarge.pdf    [facility] clinical trials (COVID-19 research studies): clinicalaffairs.[facility]/umn-clinical-trials     Below are the COVID-19 hotlines at the [facility]  "of Health ([facility]). Interpreters are available.   o For health questions: Call 705-989-4713 or 1-309.521.2254 (7 a.m. to 7 p.m.)  o For questions about schools and childcare: Call 060-724-8004 or 1-583.711.4246 (7 a.m. to 7 p.m.)                       Reason for Disposition    Extra heart beats or heart is beating fast  (i.e., \"palpitations\")    Additional Information    Negative: Started suddenly after an allergic medicine, an allergic food, or bee sting    Negative: Shock suspected (e.g., cold/pale/clammy skin, too weak to stand, low BP, rapid pulse)    Negative: Difficult to awaken or acting confused (e.g., disoriented, slurred speech)    Negative: Fainted    Negative: [1] Systolic BP < 90 AND [2] dizzy, lightheaded, or weak    Negative: Chest pain    Negative: Bleeding (e.g., vomiting blood, rectal bleeding or tarry stools, severe vaginal bleeding)(Exception: fainted from sight of small amount of blood; small cut or abrasion)    Protocols used: LOW BLOOD PRESSURE-A-AH      "

## 2021-05-24 ENCOUNTER — NURSE TRIAGE (OUTPATIENT)
Dept: FAMILY MEDICINE | Facility: OTHER | Age: 31
End: 2021-05-24

## 2021-05-24 NOTE — TELEPHONE ENCOUNTER
"S-(situation): Recent cough , fatigue, chill, elevated Blood Pressure . Josse ramirez cannot stay awake today. She is so tired.     B-(background):  Has NOT had Covid immunizations. Note: Pt is deaf so has a  on call with the RN    A-(assessment): extreme fatigue and sleepiness of unknown etiology.     R-(recommendations):  She needs to go to the ER. She does NOT drive.    911 called by the , who volunteered to to so, so she can keep an eye on pt and use sign language to continue to speak with her and her friend, who is with her...........MAUREEN Dumont      Reason for Disposition    Difficult to awaken or acting confused (e.g., disoriented, slurred speech)    Answer Assessment - Initial Assessment Questions  1. COVID-19 DIAGNOSIS: \"Who made your Coronavirus (COVID-19) diagnosis?\" \"Was it confirmed by a positive lab test?\" If not diagnosed by a HCP, ask \"Are there lots of cases (community spread) where you live?\" (See public health department website, if unsure)      She has NOT been diagnosed with COVID, but concerned she may have it.     2. COVID-19 EXPOSURE: \"Was there any known exposure to COVID before the symptoms began?\" CDC Definition of close contact: within 6 feet (2 meters) for a total of 15 minutes or more over a 24-hour period.        That is unknown    3. ONSET: \"When did the COVID-19 symptoms start?\"       She has a  on the phone and she is hearing impaired. One of the first things she told me is that Katy has a cough and she cannot stay away long enough to talk to the .    4. WORST SYMPTOM: \"What is your worst symptom?\" (e.g., cough, fever, shortness of breath, muscle aches)      Fatigue, falling asleep very tired, cough, shakes, elevated BP. Said she checked it with her machine and it was 154 /145. Did not know her pulse reading. Her O2 sats are 91% She does have a thermometer. RN asked her to check for a temp and she got 98.7. Again, " "falling asleep. \" She has a friend with her. The friend has had the Covid immunizations , Katy has NOT.    5. COUGH: \"Do you have a cough?\" If so, ask: \"How bad is the cough?\"       She does have a cough. NOTE: Doesn't know when the cough started as RN was  not told.  Pt kept dozing off so  was unable to ask.  So RN told the  she needs to go to the ER. Call 911.  She said she would do it so she can continue to sign to patient and her friend while waiting for ambulance to arrive for her.    6. FEVER: \"Do you have a fever?\" If so, ask: \"What is your temperature, how was it measured, and when did it start?\"      Temp this AM is 98.7    7. RESPIRATORY STATUS: \"Describe your breathing?\" (e.g., shortness of breath, wheezing, unable to speak)       Shortness of breath. O2 sats only 91% room air.     8. BETTER-SAME-WORSE: \"Are you getting better, staying the same or getting worse compared to yesterday?\"  If getting worse, ask, \"In what way?\"      Getting worse. Extreme fatigue    9. HIGH RISK DISEASE: \"Do you have any chronic medical problems?\" (e.g., asthma, heart or lung disease, weak immune system, obesity, etc.)      Worse. Can't stay awake.    10. PREGNANCY: \"Is there any chance you are pregnant?\" \"When was your last menstrual period?\"        NA    11. OTHER SYMPTOMS: \"Do you have any other symptoms?\"  (e.g., chills, fatigue, headache, loss of smell or taste, muscle pain, sore throat; new loss of smell or taste especially support the diagnosis of COVID-19)        Fatigue, can't stay awake. Chills, Shaky. Elevated BP    Protocols used: CORONAVIRUS (COVID-19) DIAGNOSED OR [id number]-DL 3.25      "

## 2021-05-26 ENCOUNTER — HOSPITAL ENCOUNTER (EMERGENCY)
Facility: CLINIC | Age: 31
Discharge: HOME | End: 2021-05-26
Attending: NURSE PRACTITIONER | Admitting: NURSE PRACTITIONER
Payer: MEDICARE

## 2021-05-26 ENCOUNTER — NURSE TRIAGE (OUTPATIENT)
Dept: NURSING | Facility: CLINIC | Age: 31
End: 2021-05-26

## 2021-05-26 VITALS
OXYGEN SATURATION: 94 % | TEMPERATURE: 98.1 F | SYSTOLIC BLOOD PRESSURE: 117 MMHG | DIASTOLIC BLOOD PRESSURE: 86 MMHG | RESPIRATION RATE: 16 BRPM | HEART RATE: 105 BPM

## 2021-05-26 DIAGNOSIS — K62.5 BRIGHT RED BLOOD PER RECTUM: ICD-10-CM

## 2021-05-26 DIAGNOSIS — K92.0 HEMATEMESIS WITH NAUSEA: ICD-10-CM

## 2021-05-26 DIAGNOSIS — R10.84 ABDOMINAL PAIN, GENERALIZED: ICD-10-CM

## 2021-05-26 LAB
ALBUMIN SERPL-MCNC: 3.8 G/DL (ref 3.4–5)
ALP SERPL-CCNC: 101 U/L (ref 40–150)
ALT SERPL W P-5'-P-CCNC: 87 U/L (ref 0–50)
ANION GAP SERPL CALCULATED.3IONS-SCNC: 2 MMOL/L (ref 3–14)
AST SERPL W P-5'-P-CCNC: 70 U/L (ref 0–45)
BASOPHILS # BLD AUTO: 0 10E9/L (ref 0–0.2)
BASOPHILS NFR BLD AUTO: 0.4 %
BILIRUB SERPL-MCNC: 0.3 MG/DL (ref 0.2–1.3)
BUN SERPL-MCNC: 13 MG/DL (ref 7–30)
CALCIUM SERPL-MCNC: 8.8 MG/DL (ref 8.5–10.1)
CHLORIDE SERPL-SCNC: 106 MMOL/L (ref 94–109)
CO2 SERPL-SCNC: 31 MMOL/L (ref 20–32)
CREAT SERPL-MCNC: 0.9 MG/DL (ref 0.52–1.04)
CRP SERPL-MCNC: 21.2 MG/L (ref 0–8)
DIFFERENTIAL METHOD BLD: ABNORMAL
EOSINOPHIL # BLD AUTO: 0.1 10E9/L (ref 0–0.7)
EOSINOPHIL NFR BLD AUTO: 1.3 %
ERYTHROCYTE [DISTWIDTH] IN BLOOD BY AUTOMATED COUNT: 12.5 % (ref 10–15)
GFR SERPL CREATININE-BSD FRML MDRD: 84 ML/MIN/{1.73_M2}
GLUCOSE SERPL-MCNC: 75 MG/DL (ref 70–99)
HCT VFR BLD AUTO: 32.8 % (ref 35–47)
HEMOCCULT STL QL: POSITIVE
HGB BLD-MCNC: 11.3 G/DL (ref 11.7–15.7)
IMM GRANULOCYTES # BLD: 0 10E9/L (ref 0–0.4)
IMM GRANULOCYTES NFR BLD: 0.4 %
LIPASE SERPL-CCNC: 49 U/L (ref 73–393)
LYMPHOCYTES # BLD AUTO: 1.4 10E9/L (ref 0.8–5.3)
LYMPHOCYTES NFR BLD AUTO: 25.9 %
MCH RBC QN AUTO: 34.3 PG (ref 26.5–33)
MCHC RBC AUTO-ENTMCNC: 34.5 G/DL (ref 31.5–36.5)
MCV RBC AUTO: 100 FL (ref 78–100)
MONOCYTES # BLD AUTO: 0.5 10E9/L (ref 0–1.3)
MONOCYTES NFR BLD AUTO: 9.8 %
NEUTROPHILS # BLD AUTO: 3.3 10E9/L (ref 1.6–8.3)
NEUTROPHILS NFR BLD AUTO: 62.2 %
NRBC # BLD AUTO: 0 10*3/UL
NRBC BLD AUTO-RTO: 0 /100
PLATELET # BLD AUTO: 227 10E9/L (ref 150–450)
POTASSIUM SERPL-SCNC: 3.8 MMOL/L (ref 3.4–5.3)
PROT SERPL-MCNC: 6.9 G/DL (ref 6.8–8.8)
RBC # BLD AUTO: 3.29 10E12/L (ref 3.8–5.2)
SODIUM SERPL-SCNC: 139 MMOL/L (ref 133–144)
WBC # BLD AUTO: 5.2 10E9/L (ref 4–11)

## 2021-05-26 PROCEDURE — 83690 ASSAY OF LIPASE: CPT | Performed by: NURSE PRACTITIONER

## 2021-05-26 PROCEDURE — 99284 EMERGENCY DEPT VISIT MOD MDM: CPT | Performed by: NURSE PRACTITIONER

## 2021-05-26 PROCEDURE — 250N000011 HC RX IP 250 OP 636: Performed by: NURSE PRACTITIONER

## 2021-05-26 PROCEDURE — 86140 C-REACTIVE PROTEIN: CPT | Performed by: NURSE PRACTITIONER

## 2021-05-26 PROCEDURE — 99284 EMERGENCY DEPT VISIT MOD MDM: CPT | Mod: 25 | Performed by: NURSE PRACTITIONER

## 2021-05-26 PROCEDURE — 96375 TX/PRO/DX INJ NEW DRUG ADDON: CPT | Performed by: NURSE PRACTITIONER

## 2021-05-26 PROCEDURE — 96374 THER/PROPH/DIAG INJ IV PUSH: CPT | Performed by: NURSE PRACTITIONER

## 2021-05-26 PROCEDURE — 258N000003 HC RX IP 258 OP 636: Performed by: NURSE PRACTITIONER

## 2021-05-26 PROCEDURE — 85025 COMPLETE CBC W/AUTO DIFF WBC: CPT | Performed by: NURSE PRACTITIONER

## 2021-05-26 PROCEDURE — 82272 OCCULT BLD FECES 1-3 TESTS: CPT | Performed by: NURSE PRACTITIONER

## 2021-05-26 PROCEDURE — 80053 COMPREHEN METABOLIC PANEL: CPT | Performed by: NURSE PRACTITIONER

## 2021-05-26 PROCEDURE — 87506 IADNA-DNA/RNA PROBE TQ 6-11: CPT | Performed by: NURSE PRACTITIONER

## 2021-05-26 RX ORDER — DIPHENHYDRAMINE HYDROCHLORIDE 50 MG/ML
25 INJECTION INTRAMUSCULAR; INTRAVENOUS ONCE
Status: COMPLETED | OUTPATIENT
Start: 2021-05-26 | End: 2021-05-26

## 2021-05-26 RX ORDER — ONDANSETRON 2 MG/ML
4 INJECTION INTRAMUSCULAR; INTRAVENOUS ONCE
Status: COMPLETED | OUTPATIENT
Start: 2021-05-26 | End: 2021-05-26

## 2021-05-26 RX ADMIN — SODIUM CHLORIDE 1000 ML: 9 INJECTION, SOLUTION INTRAVENOUS at 16:43

## 2021-05-26 RX ADMIN — ONDANSETRON 4 MG: 2 INJECTION INTRAMUSCULAR; INTRAVENOUS at 16:41

## 2021-05-26 RX ADMIN — DIPHENHYDRAMINE HYDROCHLORIDE 25 MG: 50 INJECTION, SOLUTION INTRAMUSCULAR; INTRAVENOUS at 16:50
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
## 2021-05-26 NOTE — DISCHARGE INSTRUCTIONS
You  have decided to leave against medical advise before all your labs results are back.  The stool analysis results should be back in the next 1 to 2 days.  I recommend you call Dr. Pritchett and let him know about your symptoms.   Call and schedule the MRI that Dr. Pritchett has ordered.

## 2021-05-26 NOTE — ED NOTES
Pt refused to have another set of vitals taken before discharge. Pt yelling and upset about not receiving narcotics and left without discharge instructions.

## 2021-05-26 NOTE — ED TRIAGE NOTES
S: Vomiting blood and rectal bleeding.  B: Last night vomiting and diarrhea started and blood was noted in both. Today concerns of weakness and lightheadedness.   A: Labs   R: Ready for provider evaluation.

## 2021-05-27 ENCOUNTER — MYC MEDICAL ADVICE (OUTPATIENT)
Dept: FAMILY MEDICINE | Facility: OTHER | Age: 31
End: 2021-05-27

## 2021-05-27 ENCOUNTER — VIRTUAL VISIT (OUTPATIENT)
Dept: FAMILY MEDICINE | Facility: OTHER | Age: 31
End: 2021-05-27
Payer: MEDICARE

## 2021-05-27 ENCOUNTER — TELEPHONE (OUTPATIENT)
Dept: FAMILY MEDICINE | Facility: OTHER | Age: 31
End: 2021-05-27

## 2021-05-27 ENCOUNTER — APPOINTMENT (OUTPATIENT)
Dept: GENERAL RADIOLOGY | Facility: CLINIC | Age: 31
End: 2021-05-27
Attending: FAMILY MEDICINE
Payer: MEDICARE

## 2021-05-27 ENCOUNTER — HOSPITAL ENCOUNTER (EMERGENCY)
Facility: CLINIC | Age: 31
Discharge: LEFT BEFORE DISCHARGE | End: 2021-05-27
Attending: FAMILY MEDICINE | Admitting: FAMILY MEDICINE
Payer: MEDICARE

## 2021-05-27 VITALS
DIASTOLIC BLOOD PRESSURE: 87 MMHG | TEMPERATURE: 98.6 F | SYSTOLIC BLOOD PRESSURE: 118 MMHG | HEART RATE: 101 BPM | OXYGEN SATURATION: 90 % | RESPIRATION RATE: 18 BRPM

## 2021-05-27 DIAGNOSIS — K50.10 CROHN'S DISEASE OF COLON WITHOUT COMPLICATION (H): Primary | ICD-10-CM

## 2021-05-27 DIAGNOSIS — K92.1 BLOODY STOOLS: ICD-10-CM

## 2021-05-27 DIAGNOSIS — R46.89 VERBALLY ABUSIVE BEHAVIOR: ICD-10-CM

## 2021-05-27 DIAGNOSIS — Z87.19 HX OF CROHN'S DISEASE: ICD-10-CM

## 2021-05-27 LAB
ALBUMIN UR-MCNC: NEGATIVE MG/DL
AMPHETAMINES UR QL: NOT DETECTED NG/ML
APPEARANCE UR: CLEAR
BARBITURATES UR QL SCN: NOT DETECTED NG/ML
BASOPHILS # BLD AUTO: 0 10E9/L (ref 0–0.2)
BASOPHILS NFR BLD AUTO: 0.2 %
BENZODIAZ UR QL SCN: ABNORMAL NG/ML
BILIRUB UR QL STRIP: NEGATIVE
BUPRENORPHINE UR QL: NOT DETECTED NG/ML
C COLI+JEJUNI+LARI FUSA STL QL NAA+PROBE: NOT DETECTED
CANNABINOIDS UR QL: ABNORMAL NG/ML
COCAINE UR QL SCN: NOT DETECTED NG/ML
COLOR UR AUTO: YELLOW
D-METHAMPHET UR QL: NOT DETECTED NG/ML
DIFFERENTIAL METHOD BLD: ABNORMAL
EC STX1 GENE STL QL NAA+PROBE: NOT DETECTED
EC STX2 GENE STL QL NAA+PROBE: NOT DETECTED
ENTERIC PATHOGEN COMMENT: NORMAL
EOSINOPHIL # BLD AUTO: 0.1 10E9/L (ref 0–0.7)
EOSINOPHIL NFR BLD AUTO: 1.7 %
ERYTHROCYTE [DISTWIDTH] IN BLOOD BY AUTOMATED COUNT: 12.6 % (ref 10–15)
ERYTHROCYTE [SEDIMENTATION RATE] IN BLOOD BY WESTERGREN METHOD: 30 MM/H (ref 0–20)
GLUCOSE UR STRIP-MCNC: NEGATIVE MG/DL
HCT VFR BLD AUTO: 29.7 % (ref 35–47)
HGB BLD-MCNC: 10.2 G/DL (ref 11.7–15.7)
HGB UR QL STRIP: NEGATIVE
KETONES UR STRIP-MCNC: NEGATIVE MG/DL
LEUKOCYTE ESTERASE UR QL STRIP: NEGATIVE
LYMPHOCYTES # BLD AUTO: 1.6 10E9/L (ref 0.8–5.3)
LYMPHOCYTES NFR BLD AUTO: 34 %
MCH RBC QN AUTO: 34.5 PG (ref 26.5–33)
MCHC RBC AUTO-ENTMCNC: 34.3 G/DL (ref 31.5–36.5)
MCV RBC AUTO: 100 FL (ref 78–100)
METHADONE UR QL SCN: NOT DETECTED NG/ML
MONOCYTES # BLD AUTO: 0.5 10E9/L (ref 0–1.3)
MONOCYTES NFR BLD AUTO: 11.5 %
NEUTROPHILS # BLD AUTO: 2.5 10E9/L (ref 1.6–8.3)
NEUTROPHILS NFR BLD AUTO: 52.6 %
NITRATE UR QL: NEGATIVE
NOROV GI+II ORF1-ORF2 JNC STL QL NAA+PR: NOT DETECTED
OPIATES UR QL SCN: NOT DETECTED NG/ML
OXYCODONE UR QL SCN: NOT DETECTED NG/ML
PCP UR QL SCN: ABNORMAL NG/ML
PH UR STRIP: 5 PH (ref 5–7)
PLATELET # BLD AUTO: 207 10E9/L (ref 150–450)
PROPOXYPH UR QL: NOT DETECTED NG/ML
RBC # BLD AUTO: 2.96 10E12/L (ref 3.8–5.2)
RVA NSP5 STL QL NAA+PROBE: NOT DETECTED
SALMONELLA SP RPOD STL QL NAA+PROBE: NOT DETECTED
SHIGELLA SP+EIEC IPAH STL QL NAA+PROBE: NOT DETECTED
SOURCE: NORMAL
SP GR UR STRIP: 1.03 (ref 1–1.03)
TRICYCLICS UR QL SCN: ABNORMAL NG/ML
UROBILINOGEN UR STRIP-MCNC: 0 MG/DL (ref 0–2)
V CHOL+PARA RFBL+TRKH+TNAA STL QL NAA+PR: NOT DETECTED
WBC # BLD AUTO: 4.7 10E9/L (ref 4–11)
Y ENTERO RECN STL QL NAA+PROBE: NOT DETECTED

## 2021-05-27 PROCEDURE — 80306 DRUG TEST PRSMV INSTRMNT: CPT | Performed by: FAMILY MEDICINE

## 2021-05-27 PROCEDURE — 81003 URINALYSIS AUTO W/O SCOPE: CPT | Performed by: FAMILY MEDICINE

## 2021-05-27 PROCEDURE — 99284 EMERGENCY DEPT VISIT MOD MDM: CPT | Mod: 25 | Performed by: FAMILY MEDICINE

## 2021-05-27 PROCEDURE — 99441 PR PHYSICIAN TELEPHONE EVALUATION 5-10 MIN: CPT | Mod: 95 | Performed by: PHYSICIAN ASSISTANT

## 2021-05-27 PROCEDURE — 85652 RBC SED RATE AUTOMATED: CPT | Performed by: FAMILY MEDICINE

## 2021-05-27 PROCEDURE — 99284 EMERGENCY DEPT VISIT MOD MDM: CPT | Performed by: FAMILY MEDICINE

## 2021-05-27 PROCEDURE — 250N000011 HC RX IP 250 OP 636: Performed by: FAMILY MEDICINE

## 2021-05-27 PROCEDURE — 96375 TX/PRO/DX INJ NEW DRUG ADDON: CPT | Performed by: FAMILY MEDICINE

## 2021-05-27 PROCEDURE — 96374 THER/PROPH/DIAG INJ IV PUSH: CPT | Performed by: FAMILY MEDICINE

## 2021-05-27 PROCEDURE — 74019 RADEX ABDOMEN 2 VIEWS: CPT

## 2021-05-27 PROCEDURE — 96361 HYDRATE IV INFUSION ADD-ON: CPT | Performed by: FAMILY MEDICINE

## 2021-05-27 PROCEDURE — 85025 COMPLETE CBC W/AUTO DIFF WBC: CPT | Performed by: FAMILY MEDICINE

## 2021-05-27 PROCEDURE — 258N000003 HC RX IP 258 OP 636: Performed by: FAMILY MEDICINE

## 2021-05-27 RX ORDER — MESALAMINE 500 MG/1
1000 CAPSULE, EXTENDED RELEASE ORAL 4 TIMES DAILY
Qty: 80 CAPSULE | Refills: 0 | Status: SHIPPED | OUTPATIENT
Start: 2021-05-27 | End: 2021-05-27

## 2021-05-27 RX ORDER — ONDANSETRON 2 MG/ML
4 INJECTION INTRAMUSCULAR; INTRAVENOUS ONCE
Status: COMPLETED | OUTPATIENT
Start: 2021-05-27 | End: 2021-05-27

## 2021-05-27 RX ORDER — DIPHENHYDRAMINE HYDROCHLORIDE 50 MG/ML
25 INJECTION INTRAMUSCULAR; INTRAVENOUS ONCE
Status: COMPLETED | OUTPATIENT
Start: 2021-05-27 | End: 2021-05-27

## 2021-05-27 RX ORDER — HYOSCYAMINE SULFATE 0.125 MG
0.25 TABLET,DISINTEGRATING ORAL ONCE
Status: DISCONTINUED | OUTPATIENT
Start: 2021-05-27 | End: 2021-05-27 | Stop reason: HOSPADM

## 2021-05-27 RX ORDER — HYDROCORTISONE ACETATE 25 MG/1
25 SUPPOSITORY RECTAL 2 TIMES DAILY PRN
Qty: 24 SUPPOSITORY | Refills: 4 | Status: SHIPPED | OUTPATIENT
Start: 2021-05-27 | End: 2021-07-21

## 2021-05-27 RX ORDER — MESALAMINE 500 MG/1
500-1000 CAPSULE, EXTENDED RELEASE ORAL 3 TIMES DAILY
Qty: 180 CAPSULE | Refills: 11 | Status: SHIPPED | OUTPATIENT
Start: 2021-05-27 | End: 2021-07-21

## 2021-05-27 RX ADMIN — SODIUM CHLORIDE, POTASSIUM CHLORIDE, SODIUM LACTATE AND CALCIUM CHLORIDE 1000 ML: 600; 310; 30; 20 INJECTION, SOLUTION INTRAVENOUS at 01:51

## 2021-05-27 RX ADMIN — ONDANSETRON 4 MG: 2 INJECTION INTRAMUSCULAR; INTRAVENOUS at 01:54

## 2021-05-27 RX ADMIN — DIPHENHYDRAMINE HYDROCHLORIDE 25 MG: 50 INJECTION, SOLUTION INTRAMUSCULAR; INTRAVENOUS at 01:53

## 2021-05-27 NOTE — ED NOTES
Pt talking with Dr. Scherer and verbalized to him that she wanted to leave. Pt refused to sign AMA form but her mom signed the form. Pt walked out upset and swearing.

## 2021-05-27 NOTE — ED NOTES
"Pt refused hyoscyamine, states \"my pain is not cramping, its stabbing pain, I'm not stupid. I would like to try some dilaudid to see if it would help\". Dr. Scherer notified.   "

## 2021-05-27 NOTE — TELEPHONE ENCOUNTER
"Triage Call:    Clark is calling with  and she just went into the ER and they didn't have the CRP results back yet when she left.     She can see that the result is 21.2 and she is concerned that she is hurting all over.   Reveiwed chart and noted that patient left AMA from ED visit.  Patient stated that was because her mom had to leave and she wouldn't have a ride home.  Advised that if she is still in a lot of pain and is concerned about her labs, she could go back in to [facility] ER.      She stated \"I can't go back in [facility].  Fuck this\" and hung up the phone per .          Pt was advised of protocol recommendation/disposition of ED.     Loida Douglas RN on 5/26/2021 at 9:41 PM        COVID 19 Nurse Triage Plan/Patient Instructions    Please be aware that novel coronavirus (COVID-19) may be circulating in the community. If you develop symptoms such as fever, cough, or SOB or if you have concerns about the presence of another infection including coronavirus (COVID-19), please contact your health care provider or visit [email or web address].     Disposition/Instructions    ED Visit recommended. Follow protocol based instructions.     Bring Your Own Device:  Please also bring your smart device(s) (smart phones, tablets, laptops) and their charging cables for your personal use and to communicate with your care team during your visit.    Thank you for taking steps to prevent the spread of this virus.  o Limit your contact with others.  o Wear a simple mask to cover your cough.  o Wash your hands well and often.    Resources    [facility]: About COVID-19: [email or web address]    CDC: What to Do If You're Sick: www.cdc.gov/coronavirus/2019-ncov/about/steps-when-sick.html    CDC: Ending Home Isolation: www.cdc.gov/coronavirus/2019-ncov/hcp/disposition-in-home-patients.html     CDC: Caring for Someone: www.cdc.gov/coronavirus/2019-ncov/if-you-are-sick/care-for-someone.html     ANGEL: " Interim Guidance for Hospital Discharge to Home: www.health.[facility].[place].us/diseases/coronavirus/hcp/hospdischarge.pdf    [facility] clinical trials (COVID-19 research studies): [email or web address]     Below are the COVID-19 hotlines at the [place] Department of [facility] ([facility]). Interpreters are available.   o For health questions: Call 879-821-1004 or 1-385.156.4988 (7 a.m. to 7 p.m.)  o For questions about schools and childcare: Call 448-118-7881 or 1-154.241.8350 (7 a.m. to 7 p.m.)                   Reason for Disposition    Caller requesting lab results    Additional Information    Negative: Lab calling with strep throat test results and triager can call in prescription    Negative: Lab calling with urinalysis test results and triager can call in prescription    Negative: Medication questions    Negative: ED call to PCP    Negative: Physician call to PCP    Negative: Call about patient who is currently hospitalized    Negative: Lab or radiology calling with CRITICAL test results    Negative: [1] Prescription not at pharmacy AND [2] was prescribed today by PCP    Negative: [1] Follow-up call from patient regarding patient's clinical status AND [2] information urgent    Negative: [1] Caller requests to speak ONLY to PCP AND [2] urgent question    Negative: [1] Caller requests to speak to PCP now AND [2] won't tell us reason for call  (Exception: if 10 pm to 6 am, caller must first discuss reason for the call)    Negative: Notification of hospital admission    Negative: Notification of death    Protocols used: [facility]

## 2021-05-27 NOTE — ED TRIAGE NOTES
Pt presents with concerns of abdominal pain.  Pt states that she was seen earlier and left AMA.  Pt states that she noticed her CRP was elevated and returned because of the result.  States that she has been vomiting for the past two days some with blood or brown in color.  Pt states that she last had an emesis on Wednesday morning.
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
## 2021-05-27 NOTE — DISCHARGE INSTRUCTIONS
Your symptoms may be due to a Crohn's flareup.  Begin mesalamine 500 mg 4 times a day and take as directed.  Follow-up with your primary clinic provider or gastroenterologist as soon as possible.

## 2021-05-27 NOTE — ED PROVIDER NOTES
[facility] ED Provider Note   Patient: Katy Islas  MRN #:  [id number]  Date of Visit: May 27, 2021    CC:     Chief Complaint   Patient presents with     Abdominal Pain     HPI:  Katy Islas is a 31 year old female with history of congenital hearing loss, bipolar disorder, with possible Crohn's disease who presented to the emergency department for second visit in the last 24 hours.  Patient left the emergency department earlier today AGAINST MEDICAL ADVICE.  She was informed that she would not receive opiate pain medication and left before discharge instructions were given.  She found out that her CRP level was slightly elevated, and she called the nurse triage line to report that she was still having abdominal pain.  She was instructed to come to the emergency department if she was still having some ongoing pain.  She states that over the last 2 days she has had 10 episodes of watery stools that were dark in color with blood clots.  She reports 3 episodes of vomiting up brown-colored emesis with a small amount of blood as well.  Patient had a previous colonoscopy and upper endoscopy in 2018 which were both normal.  These were done because of reported hematochezia and an abnormal CT scan showing some thickening of the colon.  At the time of the colonoscopy and endoscopy, her exam was unremarkable.  Patient returns to the emergency department with complaints of ongoing blood clots from the stool.  She is accompanied by her mother who is sign interpreting for the patient.    Problem List:  Patient Active Problem List    Diagnosis Date Noted     Birth elliot 05/19/2022     Priority: Medium     Suicidal behavior 05/14/2021     Priority: Medium     Renal cyst 05/12/2021     Priority: Medium     Borderline personality disorder (H) 04/27/2021     Priority: Medium     Lymphedema 09/10/2020     Priority: Medium     Patellofemoral pain syndrome of  both knees 06/11/2020     Priority: Medium     Primary osteoarthritis of both knees 06/11/2020     Priority: Medium     Abscess of anal and rectal regions 04/27/2020     Priority: Medium     Added automatically from request for surgery [id number]       Rectal pain 04/19/2020     Priority: Medium     Abdominal pain 04/18/2020     Priority: Medium     Morbid obesity (H) 03/10/2020     Priority: Medium     Abnormal uterine bleeding 02/12/2020     Priority: Medium     Added automatically from request for surgery [id number]       Anal fissure 02/04/2020     Priority: Medium     Low hematocrit 02/04/2020     Priority: Medium     Elevated d-dimer 02/04/2020     Priority: Medium     Hypertension goal BP (blood pressure) < 130/80 08/05/2019     Priority: Medium     Bulge of cervical disc without myelopathy 04/24/2019     Priority: Medium     Cervicalgia 04/17/2019     Priority: Medium     Class 1 obesity due to excess calories without serious comorbidity with body mass index (BMI) of 32.0 to 32.9 in adult 04/03/2019     Priority: Medium     Hypophosphatemia 11/01/2018     Priority: Medium     Patellar maltracking, right 09/05/2018     Priority: Medium     Right anterior knee pain 09/05/2018     Priority: Medium     Melanocytic nevus, unspecified location 07/23/2018     Priority: Medium     Dysplastic skin lesion 07/23/2018     Priority: Medium     Iliotibial band syndrome affecting lower leg, right 12/21/2017     Priority: Medium     Chondromalacia of right patellofemoral joint 12/21/2017     Priority: Medium     Upper GI bleed - suspected 07/01/2017     Priority: Medium     Tobacco use disorder 07/01/2017     Priority: Medium     History of pseudoseizure 07/01/2017     Priority: Medium     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) 03/10/2017     Priority: Medium     Lumbar disc disease with radiculopathy 02/23/2017     Priority: Medium     S/P lumbar fusion 02/23/2017     Priority: Medium     Dr. YOVANI Millan, 2/23/17        Vitamin D deficiency 01/06/2017     Priority: Medium     Atypical mole 01/06/2017     Priority: Medium     Mild persistent asthma without complication 12/02/2016     Priority: Medium     Chronic bilateral low back pain with left-sided sciatica 12/02/2016     Priority: Medium     Bee sting allergy 09/16/2016     Priority: Medium     C. difficile colitis 08/26/2016     Priority: Medium     Gastroesophageal reflux disease without esophagitis 08/26/2016     Priority: Medium     Herpes simplex virus infection 11/12/2015     Priority: Medium     Adjustment disorder with mixed anxiety and depressed mood 10/14/2015     Priority: Medium     Bipolar disorder (H) 01/31/2013     Priority: Medium     Chronic pain 02/09/2012     Priority: Medium     Patient is followed by Aura Rosario PA-C for ongoing prescription of pain medication.  All refills should only be approved by this provider, or covering partner.    Medication(s): Norco    Maximum quantity per month: 70  Clinic visit frequency required: Q 2 months     Controlled substance agreement:   Discussed and signed 4/25/17    Encounter-Level CSA - 01/12/2015:                 Controlled Substance Agreement - Scan on 1/26/2015  9:14 AM : Controlled Medication Agreement-01/12/15 (below)            Pain Clinic evaluation in the past: Yes       Date/Location:   [facility], fall 2016    DIRE Total Score(s):    4/25/2017   Total Score 17       Last [facility] website verification:  done on 4/25/17 by LOVE Maki   [email or web address]           JULIETH (generalized anxiety disorder) 09/22/2011     Priority: Medium     Mild major depression (H) 08/29/2011     Priority: Medium     Crohn's colitis (H) 08/04/2009     Priority: Medium     Dysmenorrhea 05/11/2007     Priority: Medium     Benign neoplasm of skin of lower limb, including hip 03/16/2004     Priority: Medium     Migraine      Priority: Medium     Dr. Farrar - Neurology.  Now on Inderal.  Seems to be  working.  Follow-up 9/08  Problem list name updated by automated process. Provider to review       Hearing loss      Priority: Medium     Congenital  Problem list name updated by automated process. Provider to review       Allergic rhinitis      Priority: Medium     Problem list name updated by automated process. Provider to review         Past Medical History:   Diagnosis Date     Abdominal pain 10/31- 11/4/2005     Allergic rhinitis, cause unspecified      Arthritis      C. 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, without mention of intractable epilepsy      Migraine 07/21/12     Migraine, unspecified, without mention of intractable migraine without mention of status migrainosus      Mild intermittent asthma      Mycoplasma infection in conditions classified elsewhere and of unspecified site      Other chronic pain      Renal disease      Unspecified hearing loss        MEDS: mesalamine ER (PENTASA) 500 MG CR capsule  albuterol (PROAIR HFA/PROVENTIL HFA/VENTOLIN HFA) 108 (90 Base) MCG/ACT inhaler  albuterol (PROVENTIL) (2.5 MG/3ML) 0.083% neb solution  alum & mag hydroxide-simethicone (MAALOX ADVANCED MAX ST) 400-400-40 MG/5ML SUSP suspension  aluminum chloride (DRYSOL) 20 % external solution  ARIPiprazole (ABILIFY) 30 MG tablet  azelastine (OPTIVAR) 0.05 % ophthalmic solution  buprenorphine (SUBUTEX) 2 MG SUBL sublingual tablet  buPROPion (WELLBUTRIN XL) 300 MG 24 hr tablet  busPIRone (BUSPAR) 15 MG tablet  clonazePAM (KLONOPIN) 1 MG tablet  cyclobenzaprine (FLEXERIL) 10 MG tablet  diclofenac (VOLTAREN) 1 % topical gel  dicyclomine (BENTYL) 20 MG tablet  diltiazem 2% in PLO gel  diphenhydrAMINE (BENADRYL)  50 MG capsule  DULoxetine (CYMBALTA) 60 MG EC capsule  EPINEPHrine (ANY BX GENERIC EQUIV) 0.3 MG/0.3ML injection 2-pack  furosemide (LASIX) 20 MG tablet  gabapentin (NEURONTIN) 300 MG capsule  guaiFENesin-codeine (ROBITUSSIN AC) 100-10 MG/5ML solution  hyoscyamine (LEVSIN) 0.125 MG tablet  lamoTRIgine (LAMICTAL) 25 MG tablet  lidocaine (XYLOCAINE) 2 % solution  medroxyPROGESTERone (DEPO-PROVERA) 150 MG/ML IM injection  methocarbamol (ROBAXIN) 750 MG tablet  methylPREDNISolone (MEDROL DOSEPAK) 4 MG tablet therapy pack  metoprolol succinate ER (TOPROL-XL) 50 MG 24 hr tablet  Multiple Vitamins-Minerals (EQ MULTIVITAMINS ADULT GUMMY) CHEW  naproxen (NAPROSYN) 500 MG tablet  NONFORMULARY  oxyCODONE-acetaminophen (PERCOCET) 7.5-325 MG per tablet  pantoprazole (PROTONIX) 40 MG EC tablet  penicillin V (VEETID) 500 MG tablet  prochlorperazine (COMPAZINE) 10 MG tablet  rizatriptan (MAXALT-MLT) 5 MG ODT  valACYclovir (VALTREX) 1000 mg tablet        ALLERGIES:    Allergies   Allergen Reactions     Iohexol Hives     Other reaction(s): Hives  IV contrast; patient states she tolerates contrast if she gets benadryl before  IV contrast; patient states she tolerates contrast if she gets benadryl before     Baclofen      hives     Bees Hives, Swelling and Difficulty breathing     Contrast Dye      Hives,   Updated 5/10/2016 CT Contrast.     Iodine Hives     Metoclopramide      Other reaction(s): Tremors  LW Reaction: shaking/sweating     Midazolam      Other reaction(s): Agitation     Monosodium Glutamate      Morphine Other (See Comments)     Difficulty with urination     Nsaids Other (See Comments)     GI BLEED x2     Other (Do Not Use) Other (See Comments)     Xanaflex- pt becomes disoriented and loses bladder control     Reglan [Metoclopramide Hcl] Other (See Comments)     shaking     Soma [Carisoprodol] Visual Disturbance     Sleep walking     Tizanidine      Topamax Other (See Comments)     Topamax [Topiramate] Nausea      Tingling  GI/Vomit     Tramadol      Severe Headache, Seizure Risk     Tylenol W/Codeine [Acetaminophen-Codeine] Nausea and Itching     Tylenol 3     Versed Other (See Comments)     Zolmitriptan      Makes face feel like its twitching     Droperidol Anxiety     Flu Virus Vaccine Rash      Arm swelling       Past Surgical History:   Procedure Laterality Date     AS REMOVAL OF COCCYX  10/18/2017     C FUSION OF SACROILIAC JOINT  10/26/2018     COLONOSCOPY  7/1/2009    [facility].     COLONOSCOPY N/A 7/17/2019    Procedure: Colonoscopy, With Polypectomy And Biopsy;  Surgeon: Edwin Conde MD;  Location: [facility] OR     COLONOSCOPY WITH CO2 INSUFFLATION N/A 7/17/2019    Procedure: COLONOSCOPY, WITH CO2 INSUFFLATION;  Surgeon: Edwin Conde MD;  Location: [facility] OR     COMBINED ESOPHAGOSCOPY, GASTROSCOPY, DUODENOSCOPY (EGD) WITH CO2 INSUFFLATION N/A 4/12/2019    Procedure: COMBINED ESOPHAGOSCOPY, GASTROSCOPY, DUODENOSCOPY (EGD) WITH CO2 INSUFFLATION;  Surgeon: Edwin Conde MD;  Location: [facility] OR     ESOPHAGOSCOPY, GASTROSCOPY, DUODENOSCOPY (EGD), COMBINED N/A 4/12/2019    Procedure: Combined Esophagoscopy, Gastroscopy, Duodenoscopy (Egd), Biopsy Single Or Multiple;  Surgeon: Edwin Conde MD;  Location: [facility] OR     FISTULOTOMY RECTUM N/A 4/30/2020    Procedure: EXAM UNDER ANESTHESIA, ANUS, parital fistulotomy;  Surgeon: Valentin Sanchez MD;  Location: [facility] OR     FUSION SPINE POSTERIOR MINIMALLY INVASIVE ONE LEVEL N/A 2/23/2017    Procedure: FUSION SPINE POSTERIOR MINIMALLY INVASIVE ONE LEVEL;  L4-5 Oblique Lateral Lumbar Interbody Fusion   Epidural steroid injection.   Transpedicular Bone marrow aspiration;  Surgeon: Jeniffer Eugene MD;  Location: [facility] OR     HC COLONOSCOPY THRU STOMA WITH BIOPSY  10/29/2003    Impression is that of normal appearing colonoscopy, without evidence of rectal bleeding.     HC COLONOSCOPY THRU STOMA, DIAGNOSTIC  10/00     normal     HC COLONOSCOPY THRU STOMA, DIAGNOSTIC  Oct 2009    Dr López- normal     HC EEG AWAKE AND SLEEP      abnormal     HC MRI BRAIN W/O CONTRAST  12/00    normal     HC REMOVAL GALLBLADDER  8/5/2009    [facility] [place]     [facility] UGI ENDOSCOPY DIAG W BIOPSY  11/11/09    Normal esophagus     ORTHOPEDIC SURGERY  October 19,2011    diskectomy L4-L5     [facility] UGI ENDOSCOPY, SIMPLE EXAM  7/00, 10/00    mild chronic esophagitis and duodenitis, neg H pylori     [facility] UGI ENDOSCOPY, SIMPLE EXAM  01/20/2005    Esophagogastroduodenoscopy, colonoscopy with biopsies.  [facility]     [facility] UGI ENDOSCOPY, SIMPLE EXAM  7/1/2009    [facility]     [facility] UGI ENDOSCOPY, SIMPLE EXAM  11/11/2009    attempted upper GI, pt. could not tolerate procedure:[facility]       Social History     Tobacco Use     Smoking status: Current Every Day Smoker     Packs/day: 0.25     Years: 5.00     Pack years: 1.25     Types: Cigarettes     Smokeless tobacco: Never Used   Substance Use Topics     Alcohol use: Not Currently     Comment: Not since last July 2018     Drug use: Not Currently     Types: Marijuana     Comment: Medical Marijuana currently-- More CBD         Review of Systems   Except as noted in HPI, all other systems were reviewed and are negative    Physical Exam     Vitals were reviewed  Patient Vitals for the past 12 hrs:   BP Temp Temp src Pulse Resp SpO2   05/27/21 0305 118/87 -- -- 101 -- --   05/27/21 0223 -- -- -- -- -- 96 %   05/27/21 0222 (!) 135/99 -- -- 100 -- --   05/27/21 0102 134/89 98.6  F (37  C) Oral 105 18 94 %     GENERAL APPEARANCE: Alert, no acute respiratory distress  FACE: normal facies  EYES: Pupils are equal  HENT: normal external exam  NECK: no adenopathy or asymmetry  RESP: normal respiratory effort; clear breath sounds bilaterally  CV: regular rate and rhythm; no significant murmurs, gallops or rubs  ABD: soft, obese, diffuse abdominal tenderness; no  rebound or guarding; bowel sounds are hyperactive  EXT: No calf tenderness or pitting edema  SKIN: no worrisome rash  NEURO: no facial droop; no focal deficits, during and speech are impaired        Available Lab/Imaging Results     Results for orders placed or performed during the hospital encounter of 05/27/21 (from the past 24 hour(s))   CBC with platelets differential   Result Value Ref Range    WBC 4.7 4.0 - 11.0 10e9/L    RBC Count 2.96 (L) 3.8 - 5.2 10e12/L    Hemoglobin 10.2 (L) 11.7 - 15.7 g/dL    Hematocrit 29.7 (L) 35.0 - 47.0 %     78 - 100 fl    MCH 34.5 (H) 26.5 - 33.0 pg    MCHC 34.3 31.5 - 36.5 g/dL    RDW 12.6 10.0 - 15.0 %    Platelet Count 207 150 - 450 10e9/L    Diff Method Automated Method     % Neutrophils 52.6 %    % Lymphocytes 34.0 %    % Monocytes 11.5 %    % Eosinophils 1.7 %    % Basophils 0.2 %    Absolute Neutrophil 2.5 1.6 - 8.3 10e9/L    Absolute Lymphocytes 1.6 0.8 - 5.3 10e9/L    Absolute Monocytes 0.5 0.0 - 1.3 10e9/L    Absolute Eosinophils 0.1 0.0 - 0.7 10e9/L    Absolute Basophils 0.0 0.0 - 0.2 10e9/L   Erythrocyte sedimentation rate auto   Result Value Ref Range    Sed Rate 30 (H) 0 - 20 mm/h   UA reflex to Microscopic   Result Value Ref Range    Color Urine Yellow     Appearance Urine Clear     Glucose Urine Negative NEG^Negative mg/dL    Bilirubin Urine Negative NEG^Negative    Ketones Urine Negative NEG^Negative mg/dL    Specific Gravity Urine 1.028 1.003 - 1.035    Blood Urine Negative NEG^Negative    pH Urine 5.0 5.0 - 7.0 pH    Protein Albumin Urine Negative NEG^Negative mg/dL    Urobilinogen mg/dL 0.0 0.0 - 2.0 mg/dL    Nitrite Urine Negative NEG^Negative    Leukocyte Esterase Urine Negative NEG^Negative    Source Midstream Urine    Urine Drugs of Abuse Screen Panel 13   Result Value Ref Range    Cannabinoids (12-xbf-5-carboxy-9-THC) Detected, Abnormal Result (A) NDET^Not Detected ng/mL    Phencyclidine (Phencyclidine) Detected, Abnormal Result (A) NDET^Not  Detected ng/mL    Cocaine (Benzoylecgonine) Not Detected NDET^Not Detected ng/mL    Methamphetamine (d-Methamphetamine) Not Detected NDET^Not Detected ng/mL    Opiates (Morphine) Not Detected NDET^Not Detected ng/mL    Amphetamine (d-Amphetamine) Not Detected NDET^Not Detected ng/mL    Benzodiazepines (Nordiazepam) Detected, Abnormal Result (A) NDET^Not Detected ng/mL    Tricyclic Antidepressants (Desipramine) Detected, Abnormal Result (A) NDET^Not Detected ng/mL    Methadone (Methadone) Not Detected NDET^Not Detected ng/mL    Barbiturates (Butalbital) Not Detected NDET^Not Detected ng/mL    Oxycodone (Oxycodone) Not Detected NDET^Not Detected ng/mL    Propoxyphene (Norpropoxyphene) Not Detected NDET^Not Detected ng/mL    Buprenorphine (Buprenorphine) Not Detected NDET^Not Detected ng/mL   XR Abdomen 2 Views    Narrative    EXAM: XR ABDOMEN 2VIEWS  LOCATION: [facility]  DATE/TIME: 5/27/2021 2:27 AM    INDICATION: Abdominal pain. Vomiting.  COMPARISON: 05/05/2021      Impression    IMPRESSION: No visible bowel dilatation. Moderate amount of colonic stool. Postsurgical change lumbar spine and left SI joint. Stimulator leads. Elevated left hemidiaphragm and left basilar atelectasis. Cholecystectomy clips.      *Note: Due to a large number of results and/or encounters for the requested time period, some results have not been displayed. A complete set of results can be found in Results Review.                Impression     Final diagnoses:   Bloody stools   Hx of Crohn's disease         ED Course & Medical Decision Making   Katy Islas is a 31 year old female who presented to the emergency department with continued abdominal pain with reported bloody stools.  Patient reports blood clots mixed in with some loose stools.  She reports that she had 10 episodes over the last couple of days.  She reported 3 episodes of vomiting with some blood in it as well.  She was diagnosed with Crohn's disease at age 11  per her mother.  The patient's brother was diagnosed with Crohn's at 6 years of age and he is currently on Humira.  Over the last several years, the patient had been complaining of some abdominal pains but her colonoscopies and upper endoscopies have been normal.  She has been seeing a gastroenterologist who has recommended an MRI enteroscopy of her bowels before considering repeating her colonoscopy.  Patient was seen in the emergency department yesterday, and left after she was told that she would not receive any Dilaudid.  Patient states that she cannot tolerate the pain any further and called the nurse triage line.  She noted that her CRP level was elevated.  Patient return to the emergency department this morning, accompanied by her mother who just picked her up a few hours ago.  Vital signs reveal a temp of 98.6, blood pressure 134/89, heart rate of 105, respiratory of 18 with 94% oxygen saturation.  CBC was repeated, and her white blood count is 4.7, hemoglobin of 10.2 which is a decline from her hemoglobin from yesterday.  The patient's previous hemoglobin from May 24 was 12.5, and 13.7 from May 13.  Her sed rate is slightly elevated at 30.  We discussed the possibility that she may have a Crohn's flareup as a cause for her symptoms.  Patient initially received a liter of lactated Ringer's, Benadryl, and Zofran.  She refused Levsin and stated that that was for colonic cramping and she was having sharp pains instead.  She continued to press for Dilaudid.  Her oxygen saturations were in the range of 88% - 90%.  She was informed that she would not receive any Dilaudid due to her low oxygen levels.  At this time patient became extremely angry, and wanted to leave AMA.  Patient was cursing at the nurse.  The patient's mother has been extremely helpful and reasonable.  Patient would not sign any paperwork.  I informed mom that I would send a prescription of mesalamine to the [facility] pharmacy, while she waits on  follow-up with the GI specialist.          Disclaimer: This note consists of words and symbols derived from keyboarding and dictation using voice recognition software.  As a result, there may be errors that have gone undetected.  Please consider this when interpreting information found in this note.       Emily Scherer MD  05/27/21 [id number]

## 2021-05-27 NOTE — TELEPHONE ENCOUNTER
Prior Authorization Retail Medication Request    Medication/Dose: mesalamine ER (PENTASA) 500 MG CR capsule  ICD code (if different than what is on RX):    Previously Tried and Failed:    Rationale:      Insurance Name:    Insurance ID:        Pharmacy Information (if different than what is on RX)  Name:    Phone:

## 2021-05-27 NOTE — RESULT ENCOUNTER NOTE
Final Enteric Bacteria and Virus Panel by BENJI Stool is NEGATIVE for Campylobacter group, Salmonella species, Shigella species, Shiga toxin 1 gene, Shiga toxin 2 gene, Vibrio group,  Yersinia enterocolitica,  Rotavirus A,  and Norovirus I and II.  No treatment or change in treatment per [facility] Lab Result Enteric Bacteria and Virus Panel protocol.

## 2021-05-28 ENCOUNTER — TELEPHONE (OUTPATIENT)
Dept: FAMILY MEDICINE | Facility: OTHER | Age: 31
End: 2021-05-28

## 2021-05-28 ENCOUNTER — PATIENT OUTREACH (OUTPATIENT)
Dept: CARE COORDINATION | Facility: CLINIC | Age: 31
End: 2021-05-28

## 2021-05-28 ENCOUNTER — MYC MEDICAL ADVICE (OUTPATIENT)
Dept: FAMILY MEDICINE | Facility: OTHER | Age: 31
End: 2021-05-28

## 2021-05-28 ENCOUNTER — TRANSFERRED RECORDS (OUTPATIENT)
Dept: HEALTH INFORMATION MANAGEMENT | Facility: CLINIC | Age: 31
End: 2021-05-28

## 2021-05-28 DIAGNOSIS — Z71.89 OTHER SPECIFIED COUNSELING: ICD-10-CM

## 2021-05-28 NOTE — TELEPHONE ENCOUNTER
Tried calling patient to do wellness check as officer could not get a hold of patient and was not home.     Patient did not answer call. Message was left for patient to return call to clinic.     MAUREEN Jacques/[facility]

## 2021-05-28 NOTE — TELEPHONE ENCOUNTER
PA Initiation    Medication: mesalamine ER (PENTASA) 500 MG CR capsule - INITIATED  Insurance Company: [employer] - Phone [phone] Fax [phone]  Pharmacy Filling the Rx: [facility] [id number] [place] [facility] [place] [facility]  Filling Pharmacy Phone: [phone]  Filling Pharmacy Fax:    Start Date: 5/28/2021

## 2021-05-28 NOTE — TELEPHONE ENCOUNTER
Prior Authorization Approval    Authorization Effective Date: 2/27/2021  Authorization Expiration Date: 5/28/2022  Medication: mesalamine ER (PENTASA) 500 MG CR capsule - approved  Approved Dose/Quantity: 180 for 30 days  Insurance Company: [employer] [phone] Fax [phone]  Which Pharmacy is filling the prescription (Not needed for infusion/clinic administered): [facility] [id number] [address]  Pharmacy Notified: Yes  Patient Notified: Yes Pharmacy will notify patient once order is ready.

## 2021-05-28 NOTE — TELEPHONE ENCOUNTER
Deputy Noel called and spoke with me to gather a bit more information on what threat patient had made. Writer informed him she made comment about going to see her dad and per provider, her dad is no longer living. Officer states it could mean that she was going to visit her dad at the [facility], writer stated in the context of the rest of the [employer] message, it did not appear that's what she meant and we have to take it seriously.  stated understanding.      states that they texted her and called her. When he called, she answered and heard who it was and hung up the phone on him. Stopped over at her home and no one was there. Talked with neighbors who state she left about an hour prior.  stated he will try to call once more otherwise he is going to close the case. There is no active threat of harm or plan in place to put a hold on patient, so there is nothing else they can do.     Writer states she can try to call and do wellness check and will let provider know what happened and the outcome.      that called was Bert   Phone # [phone]    MAUREEN Jacques/[facility]

## 2021-05-28 NOTE — TELEPHONE ENCOUNTER
This is a death threat as patient's father is . She will no longer let me talk to her mother.     We should call the police for a welfare check.    Zach Rosario PA-C  [facility]

## 2021-05-28 NOTE — PROGRESS NOTES
Clinic Care Coordination Contact    Situation: Patient chart reviewed by care coordinator.    Background: pt identified on Ed discharge list.     Assessment: per chart review pt declined CC on 5/6/21 and per [email or web address] messages between yesterday and today she is NOT wanting any phone call from the clinic and she is changing clinics.      Plan/Recommendations: care coordination will not call out at this time.     DOYLE Montes De Oca, [facility] - Care Coordinator   [facility]  5/28/2021   9:50 AM  [phone]

## 2021-05-28 NOTE — TELEPHONE ENCOUNTER
Called [facility] police department. They are sending an officer out to check on her.     MAUREEN Jacques/[facility]

## 2021-05-28 NOTE — TELEPHONE ENCOUNTER
Pt states she is having sticky eyes, pt would not give me any information. Pt was scheduled inappropriately on [facility] schedule and was very upset that she could not be seen by another provider. I did inform her I will put a phone call in to her primary and the RN to call back, she then stated she will no longer come back to the clinic again. Please review and advise.    Dayana Dowd CMA

## 2021-05-30 ENCOUNTER — TRANSFERRED RECORDS (OUTPATIENT)
Dept: HEALTH INFORMATION MANAGEMENT | Facility: CLINIC | Age: 31
End: 2021-05-30

## 2021-05-30 LAB — INR PPP: 0.9 (ref 0.9–1.1)

## 2021-06-02 ENCOUNTER — TRANSFERRED RECORDS (OUTPATIENT)
Dept: HEALTH INFORMATION MANAGEMENT | Facility: CLINIC | Age: 31
End: 2021-06-02

## 2021-06-02 ENCOUNTER — MYC MEDICAL ADVICE (OUTPATIENT)
Dept: FAMILY MEDICINE | Facility: OTHER | Age: 31
End: 2021-06-02

## 2021-06-05 ENCOUNTER — TRANSFERRED RECORDS (OUTPATIENT)
Dept: HEALTH INFORMATION MANAGEMENT | Facility: CLINIC | Age: 31
End: 2021-06-05

## 2021-06-07 NOTE — ED TRIAGE NOTES
"Sore throat  5 days  3/31/2020     Also Nausea    Thurs   Cramps I stomach  Headache  THroat sore  Fever that night 101 Friday 4/3 some breathing issues  X 1     Exp to strep p    Bacterial vaginosis  Vaginal Discharge   +Yellow       Clindamycin 300 mg three times a day     Tara Sorenson is a 28 y.o. female who is being evaluated via a billable telephone visit.      The patient has been notified of following:     \"This telephone visit will be conducted via a call between you and your physician/provider. We have found that certain health care needs can be provided without the need for a physical exam.  This service lets us provide the care you need with a short phone conversation.  If a prescription is necessary we can send it directly to your pharmacy.  If lab work is needed we can place an order for that and you can then stop by our lab to have the test done at a later time.    If during the course of the call the physician/provider feels a telephone visit is not appropriate, you will not be charged for this service.\"     Patient has given verbal consent to a Telephone visit? Yes    Tara Sorenson complains of    Chief Complaint   Patient presents with     Sore Throat     Ongoing x 5 days. Patients girlfriend tested postitive for strep throat yesterday.      Nausea     Patient states nausea started last monday. No vomiting.      Fever     Fever starting thursday 101. Patient states temperature has been flucutating between .        I have reviewed and updated the patient's Past Medical History, Social History, Family History and Medication List.    ALLERGIES  Haloperidol; Compazine [prochlorperazine]; and Metoclopramide hcl    Additional provider notes: insert own note template here       Fever 101 began on around 3/31/2020  Nausea cramps stomach  Some odynophagia  Was worried about covid-19 virus  She will 1 day of shortness of breath  Had some headaches  She was exposed to her partner " Pt presents with concerns for abdominal pain, nausea, and blood in stools. Pt declined , offered right away due to further language barrier with mask usage. Pt recently had heart monitor placed on Tuesday and spinal stimulator placed Wednesday this week. Patient's airway, breathing, circulation, and disability/mental status (ABCDs) intact/WDL during triage.     who tested positive for strep throat in the process of being worked up for covid-19    She also has a vaginal discharge odor with yellow she is had bacterial vaginosis in the past    She denies any current shortness of breath  She denies any pleuritic chest pain  She denies any other GI symptoms currently except for the nausea    We also discussed migraines today  She uses rizatriptan plus Tylenol Advil as a cocktail and this is helpful  We talked about limits of medication and using it in appropriate fashion not to go outside of the package insert use of the medication  I cautioned her about using over the amount of the prescribed dosing could cause deleterious side effects in cluding kidney and liver dysfunction    Assessment/Plan:  1. Migraine without aura and with status migrainosus, not intractable    - rizatriptan (MAXALT-MLT) 10 MG disintegrating tablet; TAKE 1 TABLET BY MOUTH AS NEEDED FOR MIGRAINE. MAY REPEAT IN 2 HOURS IF NEEDED  Dispense: 9 tablet; Refill: 2  - rizatriptan (MAXALT) 10 MG tablet; TAKE 1 TABLET BY MOUTH AS NEEDED FOR MIGRAINE. MAY REPEAT IN 2 HOURS  Dispense: 9 tablet; Refill: 1    2. Sore throat    - clindamycin (CLEOCIN) 300 MG capsule; Take 1 capsule (300 mg total) by mouth 3 (three) times a day for 10 days. For strep throat  Dispense: 30 capsule; Refill: 0    3. Fever, unspecified fever cause      4. Nausea      5. Exposure to Streptococcal pharyngitis    - clindamycin (CLEOCIN) 300 MG capsule; Take 1 capsule (300 mg total) by mouth 3 (three) times a day for 10 days. For strep throat  Dispense: 30 capsule; Refill: 0    6. Bacterial vaginosis    - clindamycin (CLEOCIN) 300 MG capsule; Take 1 capsule (300 mg total) by mouth 3 (three) times a day for 10 days. For strep throat  Dispense: 30 capsule; Refill: 0        Phone call duration: 25  Minutes   11:25 am - 11:50 am     Joshua Dumont MD

## 2021-06-12 ENCOUNTER — TRANSFERRED RECORDS (OUTPATIENT)
Dept: HEALTH INFORMATION MANAGEMENT | Facility: CLINIC | Age: 31
End: 2021-06-12

## 2021-06-12 LAB
ALT SERPL-CCNC: 37 U/L (ref 8–45)
AST SERPL-CCNC: 20 U/L (ref 5–41)
CREAT SERPL-MCNC: 0.88 MG/DL (ref 0.57–1.11)
GFR SERPL CREATININE-BSD FRML MDRD: >60 ML/MIN/1.73M2
GLUCOSE SERPL-MCNC: 97 MG/DL (ref 70–100)
POTASSIUM SERPL-SCNC: 4.1 MMOL/L (ref 3.5–5.1)

## 2021-06-13 ENCOUNTER — TRANSFERRED RECORDS (OUTPATIENT)
Dept: HEALTH INFORMATION MANAGEMENT | Facility: CLINIC | Age: 31
End: 2021-06-13

## 2021-06-13 LAB
ALT SERPL-CCNC: 36 U/L (ref 8–45)
AST SERPL-CCNC: 19 U/L (ref 5–41)
CREAT SERPL-MCNC: 0.99 MG/DL (ref 0.57–1.11)
GFR SERPL CREATININE-BSD FRML MDRD: >60 ML/MIN/1.73M2
GLUCOSE SERPL-MCNC: 106 MG/DL (ref 70–100)
POTASSIUM SERPL-SCNC: 3.8 MMOL/L (ref 3.5–5.1)

## 2021-06-14 ENCOUNTER — TELEPHONE (OUTPATIENT)
Dept: FAMILY MEDICINE | Facility: OTHER | Age: 31
End: 2021-06-14

## 2021-06-14 DIAGNOSIS — K21.9 GASTROESOPHAGEAL REFLUX DISEASE WITHOUT ESOPHAGITIS: ICD-10-CM

## 2021-06-14 DIAGNOSIS — J18.9 PNEUMONIA OF LEFT UPPER LOBE DUE TO INFECTIOUS ORGANISM: Primary | ICD-10-CM

## 2021-06-14 RX ORDER — LIDOCAINE HYDROCHLORIDE 20 MG/ML
15 SOLUTION OROPHARYNGEAL EVERY 4 HOURS PRN
Qty: 100 ML | Refills: 3 | Status: SHIPPED | OUTPATIENT
Start: 2021-06-14 | End: 2021-08-16

## 2021-06-14 NOTE — TELEPHONE ENCOUNTER
Pending Prescriptions:                       Disp   Refills    lidocaine (XYLOCAINE) 2 % solution         100 mL 3        Sig: Swish and swallow 15 mLs in mouth every 4 hours as           needed for other (GERD) ; Max 8 doses/24 hour           period. Mix with 15 mLs Maalox or Mylanta.        Routing refill request to provider for review/approval because:  Drug not on the [facility] refill protocol     Jose Finney RN, BSN  [place]  June 14, 2021

## 2021-06-14 NOTE — TELEPHONE ENCOUNTER
Called to discuss the message below.   Patient was scheduled to see Zach tomorrow at 2:30PM.     We discussed Atelectasis and discussed the information below.     What causes atelectasis?   Atelectasis is caused by a blocked airway, or pressure from outside the lung. Some causes include:     An object or tumor in an airway    Mucus blocking an airway    Certain lung diseases, including infections    Fluid or air buildup in the space around the lung (pleura)    General anesthesia for chest and belly (abdominal) surgery combined with opioid medicines for pain control  You may also be at higher risk for atelectasis if you are obese or have sleep apnea, asthma, COPD, cystic fibrosis, or another lung disease.   What are the symptoms of atelectasis?   The symptoms include:    Trouble breathing    Chest pain  How is atelectasis diagnosed?  You may have one or more of the following tests:     Lab tests. These are done to check the level of oxygen and other gases in the blood.    Chest X-rays. These create images of the lungs.    CT scans. These create detailed pictures of the lungs.    Bronchoscopy. This lets the healthcare provider see inside the airways. It uses a thin tube that has a light and a camera on the end (bronchoscope).  How is atelectasis treated?  Treating the underlying cause often allows the lung to re-expand. A serious lung infection (pneumonia) often occurs when the lung collapses. To help the lung tissue re-expand and prevent pneumonia, the following treatments may be prescribed:     Chest percussion. This loosens mucus and help prevent pneumonia. It involves clapping on the chest with a cupped hand.    Postural drainage. This helps to drain mucus from the lungs. It requires lying in certain positions for a given amount of time.    Deep breathing exercises.  These help expand the lungs and clear mucus. They also help prevent pneumonia.    Incentive spirometry. This encourages deep breathing.    Medicines  that are breathed in (inhaled). These open up the airway. If you have an infection, antibiotics may also be given.    Bronchoscopy. This removes secretions or foreign objects that may be causing the blockage.  When to call the healthcare provider  Call your healthcare provider, if you have any of these:     Fever of 100.4  ( 38 C) or higher, or as directed by your healthcare provider    Lasting cough    Fast breathing    Tiredness  When to call 911  Call 911 if any of these occur:        Your skin is blue, purple or gray in color    Dizziness or loss of consciousness    Wheezing or trouble breathing    Trouble talking or not able to speak     [employer] last reviewed this educational content on 10/1/2019    7316-3697 [employer]. All rights reserved. This information is not intended as a substitute for professional medical care. Always follow your healthcare professional's instructions.          Jose Finney RN, BSN  [place]  June 14, 2021

## 2021-06-14 NOTE — TELEPHONE ENCOUNTER
Patient calling to discuss recent ER visits.     Patient stated that she was in the ER through [facility] yesterday and on 06/12/21. The patient was seen for hematemesis with nausea and then vomiting and diarrhea. Patient was advised to remain at the hospital. However, the patient declined due to pets. Patient currently has pneumonia and is wanting a RX sent for antibiotics. She also states that she has a low lung volume. One night the patient was vomiting blood, but this has resolved. Patient also had a UA completed and mentioned there are RBCs and protein in the urine.     Patient also requesting lidocaine to be sent to her pharmacy.       Jose Finney RN, BSN  [place]  June 14, 2021
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
## 2021-06-21 ENCOUNTER — PATIENT OUTREACH (OUTPATIENT)
Dept: CARE COORDINATION | Facility: CLINIC | Age: 31
End: 2021-06-21

## 2021-06-21 ENCOUNTER — TELEPHONE (OUTPATIENT)
Dept: FAMILY MEDICINE | Facility: OTHER | Age: 31
End: 2021-06-21

## 2021-06-21 DIAGNOSIS — Z71.89 OTHER SPECIFIED COUNSELING: Primary | Chronic | ICD-10-CM

## 2021-06-21 NOTE — PROGRESS NOTES
Clinic Care Coordination Contact  Community Health Worker Initial Outreach       The patient stated that she has transferred her services from [facility]. The CHW will remove the order for CCC at this time.
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
## 2021-06-23 ENCOUNTER — TRANSFERRED RECORDS (OUTPATIENT)
Dept: HEALTH INFORMATION MANAGEMENT | Facility: CLINIC | Age: 31
End: 2021-06-23

## 2021-06-23 ENCOUNTER — NURSE TRIAGE (OUTPATIENT)
Dept: FAMILY MEDICINE | Facility: OTHER | Age: 31
End: 2021-06-23

## 2021-06-23 NOTE — TELEPHONE ENCOUNTER
I can not work in as I am already almost done for the day. She should be triaged for her neck pain to see if appropriate for ED/UC or if can wait until next week. Though I would advise that I am not in charge of the care for her spine, that is [facility]. So she should reach out to them with issues.     Zach Rosario PA-C  [facility]

## 2021-06-23 NOTE — TELEPHONE ENCOUNTER
Spoke with patient through  services.    Has two herniated disc and having pain off and on for last year. Last year in April had one injection for it and that seemed to help.  Just recently it have been coming back more and more. She also has a rash on back of her head and losing hair.    Neck hurts.  7/10    Has tried OTC tylenol and gabapentin and muscle relaxer.  Hard to move her head her neck hurts so bad, but is able to.  Does not appear swollen.      She also have [place] It's about 50 pounds. Advised that could possibly have irritated it and rest, fluids, gentle stretching may help along with heat and ice and that it may take some time.    Patient stated that writer was not listening to her as she has already been doing that.  Informed patient again that I have home care guidelines that I can encouraged to keep trying but its best if she makes the appointments that she scheduled.  States that she over slept.  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      Iggy Bernstein, EDYTAN, RN, PHN  [facility] ~ Registered Nurse  Clinic Triage ~ [place] [facility] [place]  June 23, 2021

## 2021-06-23 NOTE — TELEPHONE ENCOUNTER
This nurse called to discuss the patient's symptoms with her. Will send a Hobbyt message to the patient.     Jose Finney RN, BSN  [place]  June 23, 2021

## 2021-06-23 NOTE — TELEPHONE ENCOUNTER
Missed appointment today, to early for her. Needs to be seen, severe neck pain, needing pain med, please return her call as soon as possible. Ida Bradley LPN

## 2021-07-02 ENCOUNTER — TRANSFERRED RECORDS (OUTPATIENT)
Dept: HEALTH INFORMATION MANAGEMENT | Facility: CLINIC | Age: 31
End: 2021-07-02

## 2021-07-02 LAB
ALT SERPL-CCNC: 31 U/L (ref 8–45)
AST SERPL-CCNC: 19 U/L (ref 5–41)
CREATININE (EXTERNAL): 0.89 MG/DL (ref 0.57–1.11)
GFR ESTIMATED (EXTERNAL): >60 ML/MIN/1.73M2
GLUCOSE (EXTERNAL): 93 MG/DL (ref 70–105)
POTASSIUM (EXTERNAL): 3.7 MMOL/L (ref 3.5–5.1)

## 2021-07-05 ENCOUNTER — TRANSFERRED RECORDS (OUTPATIENT)
Dept: HEALTH INFORMATION MANAGEMENT | Facility: CLINIC | Age: 31
End: 2021-07-05

## 2021-07-06 DIAGNOSIS — F41.9 ANXIETY: ICD-10-CM

## 2021-07-06 DIAGNOSIS — F32.0 MILD MAJOR DEPRESSION (H): ICD-10-CM

## 2021-07-06 DIAGNOSIS — G89.29 CHRONIC BILATERAL LOW BACK PAIN WITH LEFT-SIDED SCIATICA: ICD-10-CM

## 2021-07-06 DIAGNOSIS — M54.42 CHRONIC BILATERAL LOW BACK PAIN WITH LEFT-SIDED SCIATICA: ICD-10-CM

## 2021-07-06 RX ORDER — GABAPENTIN 300 MG/1
300 CAPSULE ORAL 3 TIMES DAILY
Qty: 270 CAPSULE | Refills: 3 | Status: SHIPPED | OUTPATIENT
Start: 2021-07-06 | End: 2021-07-26

## 2021-07-06 RX ORDER — CYCLOBENZAPRINE HCL 10 MG
10 TABLET ORAL 3 TIMES DAILY PRN
Qty: 90 TABLET | Refills: 3 | Status: CANCELLED | OUTPATIENT
Start: 2021-07-06

## 2021-07-06 RX ORDER — CLONAZEPAM 1 MG/1
1 TABLET ORAL 2 TIMES DAILY PRN
Qty: 60 TABLET | Refills: 2 | Status: CANCELLED | OUTPATIENT
Start: 2021-07-06

## 2021-07-06 NOTE — TELEPHONE ENCOUNTER
Watery diarrhea, vomiting, stomach cramping, feels like crap, no fever    Was on antibiotics about a month ago.    Patient requesting C-Diff tests.  Fecal culture order  And refill below    Aysha [phone] call this number as patient phone is down and cannot receive calls.     Requested Prescriptions   Pending Prescriptions Disp Refills     gabapentin (NEURONTIN) 300 MG capsule 270 capsule 3     Sig: Take 1 capsule (300 mg) by mouth 3 times daily       There is no refill protocol information for this order          Last Written Prescription Date:  4/19/2020  Last Fill Quantity: 270,  # refills: 3   Last office visit: 6/23/2021 with prescribing provider: this was a no show, 6/7/2021 seen in [facility] Cathy Baeza    5/27/2021 Virtual Visit Davi   Future Office Visit:          Routing refill request to provider for review/approval because:  Drug not on the [facility] refill protocol                 Lisa Patterson RN  [facility]

## 2021-07-06 NOTE — TELEPHONE ENCOUNTER
Needs virtual visit for new symptoms. Please assist in scheduling.    Refills given.     Zach Tomlinson-LOVE Moss  [facility]

## 2021-07-07 ENCOUNTER — VIRTUAL VISIT (OUTPATIENT)
Dept: FAMILY MEDICINE | Facility: OTHER | Age: 31
End: 2021-07-07
Payer: MEDICARE

## 2021-07-07 ENCOUNTER — TELEPHONE (OUTPATIENT)
Dept: FAMILY MEDICINE | Facility: CLINIC | Age: 31
End: 2021-07-07

## 2021-07-07 DIAGNOSIS — R11.0 NAUSEA: ICD-10-CM

## 2021-07-07 DIAGNOSIS — R19.7 DIARRHEA, UNSPECIFIED TYPE: ICD-10-CM

## 2021-07-07 DIAGNOSIS — R19.7 DIARRHEA, UNSPECIFIED TYPE: Primary | ICD-10-CM

## 2021-07-07 LAB
BASOPHILS # BLD AUTO: 0 10E9/L (ref 0–0.2)
BASOPHILS NFR BLD AUTO: 0.2 %
DIFFERENTIAL METHOD BLD: ABNORMAL
EOSINOPHIL # BLD AUTO: 0.1 10E9/L (ref 0–0.7)
EOSINOPHIL NFR BLD AUTO: 0.9 %
ERYTHROCYTE [DISTWIDTH] IN BLOOD BY AUTOMATED COUNT: 11.9 % (ref 10–15)
HCT VFR BLD AUTO: 38.8 % (ref 35–47)
HGB BLD-MCNC: 13.3 G/DL (ref 11.7–15.7)
LYMPHOCYTES # BLD AUTO: 1.7 10E9/L (ref 0.8–5.3)
LYMPHOCYTES NFR BLD AUTO: 32 %
MCH RBC QN AUTO: 33.7 PG (ref 26.5–33)
MCHC RBC AUTO-ENTMCNC: 34.3 G/DL (ref 31.5–36.5)
MCV RBC AUTO: 98 FL (ref 78–100)
MONOCYTES # BLD AUTO: 0.5 10E9/L (ref 0–1.3)
MONOCYTES NFR BLD AUTO: 10.2 %
NEUTROPHILS # BLD AUTO: 3 10E9/L (ref 1.6–8.3)
NEUTROPHILS NFR BLD AUTO: 56.7 %
PLATELET # BLD AUTO: 294 10E9/L (ref 150–450)
RBC # BLD AUTO: 3.95 10E12/L (ref 3.8–5.2)
WBC # BLD AUTO: 5.3 10E9/L (ref 4–11)

## 2021-07-07 PROCEDURE — 99441 PR PHYSICIAN TELEPHONE EVALUATION 5-10 MIN: CPT | Mod: 95 | Performed by: NURSE PRACTITIONER

## 2021-07-07 PROCEDURE — 85025 COMPLETE CBC W/AUTO DIFF WBC: CPT | Performed by: NURSE PRACTITIONER

## 2021-07-07 PROCEDURE — 36415 COLL VENOUS BLD VENIPUNCTURE: CPT | Performed by: NURSE PRACTITIONER

## 2021-07-07 NOTE — Clinical Note
LJ it was short and she did not have any additional concerns. She will follow up with you as needed.     NATA Retana CNP  Questions or concerns please feel free to send me a [employer] message or call me  Phone : [phone]

## 2021-07-07 NOTE — TELEPHONE ENCOUNTER
Spoke with pt, she would like to keep this afternoons appt and she feels she needs to be seen in person. Hasn't felt good in weeks.

## 2021-07-07 NOTE — TELEPHONE ENCOUNTER
Left message for patient to return call. Please find out if patient still needs appointment this afternoon as Patient had virtual visit this morning for diarrhea and cramping. Patient has in-person appointment with DF at 2:20pm today.

## 2021-07-07 NOTE — PROGRESS NOTES
Katy is a 31 year old who is being evaluated via a billable telephone visit.      What phone number would you like to be contacted at? [phone]  How would you like to obtain your AVS? 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   - PCP approved visit with alternate provider today.   - Patient has had on and off diarrhea, liquid since being on antibiotics 3 weeks ago.   - Hard to say if this is infectious or other etiology at this point. Patient notes cramping, no fevers.   - Recommend stool cultures and since she does have some abdominal discomfort I will do a CBC and XRay to be sure no need for a CT or other infectious work up/concern for obstruction.   - Patients mother interpreted for her and patient agrees with plan and will come in today for labs and xray   - Recommend if not urinating, feeling lethargic and not able to take water in (she is taking in some) then go in for fluids in ER  - Recommend if worsening symptoms or fevers to go in to be evaluated as well.  - Clostridium difficile Toxin B PCR; Future  - Enteric Bacteria and Virus Panel by BENJI Stool; Future  - Ova and Parasite Exam Routine; Future  - XR Abdomen 2 Views  - CBC with platelets and differential; Future    Nausea  - Avoid any antidiarrheal medication, patient has been doing this.   - Recommend Zofran as needed, she has this on hand already.   - XR Abdomen 2 Views         Tobacco Cessation:   reports that she has been smoking cigarettes. She has a 1.25 pack-year smoking history. She has never used smokeless tobacco.  Tobacco Cessation Action Plan: Did not discuss    Follow up with PCP in 1 week if questions or concerns.     The patient indicates understanding of these issues and agrees with the plan.    There are no Patient Instructions on file for this visit.    No follow-ups on file.    NATA Retana [facility]  SÁNCHEZ Burns is a 31 year old who presents for the following health issues   HPI     Patient has not been feeling well for the last 3 weeks. For the last two weeks she has had diarrhea. She has about 5 episodes within a 24 hour period. She is not eating or drinking well. Denies chest pain, fever, or difficult breathing. Feels extremely weak, fatigued, and has no energy.     Jose Finney RN, BSN  [place]  July 7, 2021    Diarrhea  Onset/Duration: 2 weeks  Description:       Consistency of stool: watery       Blood in stool: YES- maybe a little but not sure.        Number of loose stools past 24 hours: 5  Progression of Symptoms: worsening and constant  Accompanying signs and symptoms:       Fever: no       Nausea/Vomiting: YES       Abdominal pain: Cramping in the abdominal area.        Weight loss: no       Episodes of constipation: no  History   Ill contacts: no  Recent use of antibiotics: antibiotics were 3 weeks ago.   Recent travels: no  Recent medication-new or changes(Rx or OTC): no  Precipitating or alleviating factors: no   Therapies tried and outcome: None     No fevers.   Cramping         Review of Systems         Objective           Vitals:  No vitals were obtained today due to virtual visit.    Physical Exam   no distress  PSYCH: Alert and oriented times 3; coherent speech, normal   rate and volume, able to articulate logical thoughts, able   to abstract reason, no tangential thoughts, no hallucinations   or delusions  Her affect is normal  RESP: No cough, no audible wheezing, able to talk in full sentences  Remainder of exam unable to be completed due to telephone visits    Diagnostic: Pending       Phone call duration: 8 minutes

## 2021-07-08 DIAGNOSIS — R19.7 DIARRHEA, UNSPECIFIED TYPE: ICD-10-CM

## 2021-07-08 PROCEDURE — 87493 C DIFF AMPLIFIED PROBE: CPT | Performed by: NURSE PRACTITIONER

## 2021-07-08 PROCEDURE — 87209 SMEAR COMPLEX STAIN: CPT | Performed by: NURSE PRACTITIONER

## 2021-07-08 PROCEDURE — 87506 IADNA-DNA/RNA PROBE TQ 6-11: CPT | Performed by: NURSE PRACTITIONER

## 2021-07-08 PROCEDURE — 87177 OVA AND PARASITES SMEARS: CPT | Performed by: NURSE PRACTITIONER

## 2021-07-09 ENCOUNTER — MYC MEDICAL ADVICE (OUTPATIENT)
Dept: FAMILY MEDICINE | Facility: OTHER | Age: 31
End: 2021-07-09

## 2021-07-09 DIAGNOSIS — K29.01 ACUTE GASTRITIS WITH HEMORRHAGE, UNSPECIFIED GASTRITIS TYPE: ICD-10-CM

## 2021-07-09 DIAGNOSIS — K92.0 HEMATEMESIS WITH NAUSEA: ICD-10-CM

## 2021-07-09 LAB
C COLI+JEJUNI+LARI FUSA STL QL NAA+PROBE: NOT DETECTED
C DIFF TOX B STL QL: NEGATIVE
EC STX1 GENE STL QL NAA+PROBE: NOT DETECTED
EC STX2 GENE STL QL NAA+PROBE: NOT DETECTED
ENTERIC PATHOGEN COMMENT: NORMAL
NOROV GI+II ORF1-ORF2 JNC STL QL NAA+PR: NOT DETECTED
RVA NSP5 STL QL NAA+PROBE: NOT DETECTED
SALMONELLA SP RPOD STL QL NAA+PROBE: NOT DETECTED
SHIGELLA SP+EIEC IPAH STL QL NAA+PROBE: NOT DETECTED
SPECIMEN SOURCE: NORMAL
V CHOL+PARA RFBL+TRKH+TNAA STL QL NAA+PR: NOT DETECTED
Y ENTERO RECN STL QL NAA+PROBE: NOT DETECTED

## 2021-07-09 NOTE — TELEPHONE ENCOUNTER
This nurse called to discuss with the patient.   Zach please review mychart and information below.     1. She is not feeling well, but had other concerns to address.     2. Scheduled her depo injection for Monday 7/12/21.     3. Scheduled a follow-up with Zach on 7/26/21. The patient is moving on 08/09/21.     4. Patient needs a refill on the Sucralfate. She will send us a Heyday message regarding what else she needs a refill on.   Sucralfate is not current on the medication list. Will route to PCP.       Jose Finney RN, BSN  [place]  July 9, 2021

## 2021-07-11 RX ORDER — SUCRALFATE 1 G/1
1 TABLET ORAL 4 TIMES DAILY PRN
Qty: 360 TABLET | Refills: 3 | Status: SHIPPED | OUTPATIENT
Start: 2021-07-11 | End: 2021-09-10

## 2021-07-12 LAB
O+P STL MICRO: NORMAL
O+P STL MICRO: NORMAL
SPECIMEN SOURCE: NORMAL

## 2021-07-14 ENCOUNTER — TRANSFERRED RECORDS (OUTPATIENT)
Dept: HEALTH INFORMATION MANAGEMENT | Facility: CLINIC | Age: 31
End: 2021-07-14

## 2021-07-15 DIAGNOSIS — G89.29 CHRONIC PAIN: Primary | ICD-10-CM

## 2021-07-16 ENCOUNTER — NURSE TRIAGE (OUTPATIENT)
Dept: NURSING | Facility: CLINIC | Age: 31
End: 2021-07-16

## 2021-07-16 NOTE — TELEPHONE ENCOUNTER
"Patient calling via . She is having \"so much\" neck pain and unable to move her head and neck normally.  Has fever of 101.3  Has had a cough for a \"long time\" but last few days has produced mucus, and is afraid of Covid.  e side.      Patient should be seen in the ER, but says she has nobody to take her, but may have call an uber.  Conversation interrupted by expressions of having severe pain, and also headache.  She also noted at times had tingling in extremities and has been generally weak and uncoordinated.    Patient will call 911.    Elena Chen RN  [facility]        Reason for Disposition    [1] Headache AND [2] stiff neck (can't touch chin to chest)    Additional Information    Negative: SEVERE difficulty breathing (e.g., struggling for each breath, speaks in single words)    Negative: Difficult to awaken or acting confused (e.g., disoriented, slurred speech)    Negative: Bluish (or gray) lips or face now    Negative: Shock suspected (e.g., cold/pale/clammy skin, too weak to stand, low BP, rapid pulse)    Negative: Sounds like a life-threatening emergency to the triager    Protocols used: CORONAVIRUS (COVID-19) DIAGNOSED OR [id number]-SV 3.25      "

## 2021-07-19 ENCOUNTER — PATIENT OUTREACH (OUTPATIENT)
Dept: CARE COORDINATION | Facility: CLINIC | Age: 31
End: 2021-07-19

## 2021-07-19 NOTE — PROGRESS NOTES
Clinic Care Coordination Contact  Community Health Worker Initial Outreach          The patient has transferred her services from [facility]. The CHW will remove the order for CCC at this time.    Deborah Sebastian  Critical access hospital Health Worker   [facility]  Care Coordination  Amy Anton Rogers, [place], [place], [facility]  [email or web address]   Office: [phone]  Fax: [phone]

## 2021-07-20 ENCOUNTER — TELEPHONE (OUTPATIENT)
Dept: FAMILY MEDICINE | Facility: OTHER | Age: 31
End: 2021-07-20

## 2021-07-20 NOTE — TELEPHONE ENCOUNTER
"Katy Islas is a 31 year old female   Pt calls today requesting a message be sent to PCP about recent health issues. Pt reports not feeling well for over a month. Pt had been having episodes of vomiting up bright red blood and dark coffee ground brown emesis.  Pt has been to the ER multiple times and is following up with GI on 8/8/2021.   She has a clinic appt on 7/26/2021 with PCP. She is still not feeling better. She is frustrated and \"wants her life back\".  She is wondering if there is any way possible that we can get her GI appt moved up. Informed her that we do not schedule for GI. Then she just asked that a message was sent to provider so she is aware of all that is going on. Pt informed that provider is out of clinic.  Pt has NOT vomited up bright red blood today. DENIES chest pain or difficulty breathing.     Tanika Conklin, RN, BSN    "

## 2021-07-21 ENCOUNTER — OFFICE VISIT (OUTPATIENT)
Dept: FAMILY MEDICINE | Facility: OTHER | Age: 31
End: 2021-07-21
Payer: MEDICARE

## 2021-07-21 ENCOUNTER — NURSE TRIAGE (OUTPATIENT)
Dept: NURSING | Facility: CLINIC | Age: 31
End: 2021-07-21

## 2021-07-21 VITALS
RESPIRATION RATE: 16 BRPM | DIASTOLIC BLOOD PRESSURE: 86 MMHG | TEMPERATURE: 98 F | SYSTOLIC BLOOD PRESSURE: 122 MMHG | OXYGEN SATURATION: 98 % | HEART RATE: 110 BPM

## 2021-07-21 DIAGNOSIS — K50.10 CROHN'S DISEASE OF COLON WITHOUT COMPLICATION (H): ICD-10-CM

## 2021-07-21 DIAGNOSIS — K29.01 ACUTE GASTRITIS WITH HEMORRHAGE, UNSPECIFIED GASTRITIS TYPE: ICD-10-CM

## 2021-07-21 DIAGNOSIS — K92.0 HEMATEMESIS WITH NAUSEA: ICD-10-CM

## 2021-07-21 PROCEDURE — 99215 OFFICE O/P EST HI 40 MIN: CPT | Performed by: FAMILY MEDICINE

## 2021-07-21 RX ORDER — MESALAMINE 500 MG/1
500-1000 CAPSULE, EXTENDED RELEASE ORAL 3 TIMES DAILY
Qty: 180 CAPSULE | Refills: 11 | Status: ON HOLD | OUTPATIENT
Start: 2021-07-21 | End: 2021-09-01

## 2021-07-21 RX ORDER — ONDANSETRON 4 MG/1
4 TABLET, ORALLY DISINTEGRATING ORAL EVERY 8 HOURS PRN
Qty: 20 TABLET | Refills: 0 | Status: SHIPPED | OUTPATIENT
Start: 2021-07-21 | End: 2021-07-26

## 2021-07-21 ASSESSMENT — ASTHMA QUESTIONNAIRES
QUESTION_4 LAST FOUR WEEKS HOW OFTEN HAVE YOU USED YOUR RESCUE INHALER OR NEBULIZER MEDICATION (SUCH AS ALBUTEROL): TWO OR THREE TIMES PER WEEK
QUESTION_5 LAST FOUR WEEKS HOW WOULD YOU RATE YOUR ASTHMA CONTROL: SOMEWHAT CONTROLLED
ACT_TOTALSCORE: 17
QUESTION_2 LAST FOUR WEEKS HOW OFTEN HAVE YOU HAD SHORTNESS OF BREATH: ONCE OR TWICE A WEEK
QUESTION_1 LAST FOUR WEEKS HOW MUCH OF THE TIME DID YOUR ASTHMA KEEP YOU FROM GETTING AS MUCH DONE AT WORK, SCHOOL OR AT HOME: NONE OF THE TIME
QUESTION_3 LAST FOUR WEEKS HOW OFTEN DID YOUR ASTHMA SYMPTOMS (WHEEZING, COUGHING, SHORTNESS OF BREATH, CHEST TIGHTNESS OR PAIN) WAKE YOU UP AT NIGHT OR EARLIER THAN USUAL IN THE MORNING: TWO OR THREE NIGHTS A WEEK

## 2021-07-21 ASSESSMENT — PAIN SCALES - GENERAL: PAINLEVEL: MODERATE PAIN (5)

## 2021-07-21 NOTE — TELEPHONE ENCOUNTER
used to assist with triage call.      Pt was seen in the ED on 07/19, calling this evening to report new and worsening symptoms.  Pt states her abdominal pain is worse, she is vomiting up blood, and is having a difficult time swallowing.      Triage disposition:  ED per protocol.  Patient verbalized understanding and had no further questions.  Pt requesting to speak with scheduling again, call transferred.  Unsure if pt will seek care in the ED tonight.     COVID 19 Nurse Triage Plan/Patient Instructions    Please be aware that novel coronavirus (COVID-19) may be circulating in the community. If you develop symptoms such as fever, cough, or SOB or if you have concerns about the presence of another infection including coronavirus (COVID-19), please contact your health care provider or visit [email or web address].     Disposition/Instructions    ED Visit recommended. Follow protocol based instructions.     Bring Your Own Device:  Please also bring your smart device(s) (smart phones, tablets, laptops) and their charging cables for your personal use and to communicate with your care team during your visit.    Thank you for taking steps to prevent the spread of this virus.  o Limit your contact with others.  o Wear a simple mask to cover your cough.  o Wash your hands well and often.    Resources    [facility]: About COVID-19: [email or web address]    CDC: What to Do If You're Sick: www.cdc.gov/coronavirus/2019-ncov/about/steps-when-sick.html    CDC: Ending Home Isolation: www.cdc.gov/coronavirus/2019-ncov/hcp/disposition-in-home-patients.html     CDC: Caring for Someone: www.cdc.gov/coronavirus/2019-ncov/if-you-are-sick/care-for-someone.html     [facility]: Interim Guidance for Hospital Discharge to Home: www.health.[email or web address]/diseases/coronavirus/hcp/hospdischarge.pdf    [facility] clinical trials (COVID-19 research studies): [email or web address]      Below are the COVID-19 hotlines at the [place] Department of Health ([facility]). Interpreters are available.   o For health questions: Call 685-878-5403 or 1-390.963.2529 (7 a.m. to 7 p.m.)  o For questions about schools and childcare: Call 261-391-4891 or 1-778.520.4765 (7 a.m. to 7 p.m.)               Sonya Mahmood RN/FELIPA        Reason for Disposition    [1] SEVERE pain (e.g., excruciating) AND [2] present > 1 hour    Additional Information    Negative: Severe difficulty breathing (e.g., struggling for each breath, speaks in single words)    Negative: Shock suspected (e.g., cold/pale/clammy skin, too weak to stand, low BP, rapid pulse)    Negative: Difficult to awaken or acting confused (e.g., disoriented, slurred speech)    Negative: Passed out (i.e., lost consciousness, collapsed and was not responding)    Negative: Visible sweat on face or sweat dripping down face    Negative: Sounds like a life-threatening emergency to the triager    Protocols used: ABDOMINAL PAIN - UPPER-A-AH

## 2021-07-21 NOTE — PATIENT INSTRUCTIONS
Thank you for visiting [facility]    We'll let you know your lab results as soon as we can.     Call GI to see if they can get you in sooner.  If you need help with this, let us know.  Be sure to tell them that you are having increasing pain and occasional blood in your emesis.    Take tylenol more regularly for your pains.    Start sucralfate and mesalamine to help with your abdominal pain.    If getting significantly worse, then go to the ER.      Good luck with your move.      Contact us or return if questions or concerns.     Have a nice day!    Dr. Antoine     Return in about 3 weeks (around 8/11/2021) for Recheck.      If you need medication refills, please contact your pharmacy 3 days before your prescriptions runs out or download the [facility] jonathan for your smart phone. If you are out of refills, your pharmacy will contact contact the clinic.                                     [place] Assistance [phone]

## 2021-07-21 NOTE — PROGRESS NOTES
"    Assessment & Plan       ICD-10-CM    1. Hematemesis with nausea  K92.0 ondansetron (ZOFRAN-ODT) 4 MG ODT tab     CBC with platelets     CRP inflammation     CANCELED: CBC with platelets     CANCELED: CRP, inflammation     CANCELED: CBC with platelets     CANCELED: CRP, inflammation   2. Acute gastritis with hemorrhage, unspecified gastritis type  K29.01    3. Crohn's disease of colon without complication (H)  K50.10 mesalamine ER (PENTASA) 500 MG CR capsule     CBC with platelets     CRP inflammation     CANCELED: CBC with platelets     CANCELED: CRP, inflammation     CANCELED: CBC with platelets     CANCELED: CRP, inflammation      Overall, her symptoms seem more consistent with gastroenteritis.  Recommended supportive therapies.  Given her history of Crohn's disease, I did recommend that we check a CBC and CRP to ensure that this is not worsening.  I also recommended that she follow-up with GI as soon as possible.  I did prescribe mesalamine for her to resume as well as sucralfate to help with her symptoms.  I recommended Tylenol for pain.  Patient expressed a desire to have something else for pain, but I expressed concern given her history of drug-seeking behavior and opiate dependence that this would not be the best approach for managing her symptoms.  Recommended follow-up with GI.    Portions of this note were completed using Dragon dictation software.  Although reviewed, there may be typographical and other inadvertent errors that remain.         Review of prior external note(s) from - [facility] information from [facility] reviewed  Ordering of each unique test  Prescription drug management  40 minutes spent on the date of the encounter doing chart review, history and exam, documentation and further activities per the note       BMI:   Estimated body mass index is 34.02 kg/m  as calculated from the following:    Height as of 4/2/21: 1.575 m (5' 2\").    Weight as of 5/19/21: 84.4 kg (186 lb). "   Weight management plan: Discussed healthy diet and exercise guidelines    Patient Instructions   Thank you for visiting [facility]    We'll let you know your lab results as soon as we can.     Call GI to see if they can get you in sooner.  If you need help with this, let us know.  Be sure to tell them that you are having increasing pain and occasional blood in your emesis.    Take tylenol more regularly for your pains.    Start sucralfate and mesalamine to help with your abdominal pain.    If getting significantly worse, then go to the ER.      Good luck with your move.      Contact us or return if questions or concerns.     Have a nice day!    Dr. Antoine     Return in about 3 weeks (around 8/11/2021) for Recheck.      If you need medication refills, please contact your pharmacy 3 days before your prescriptions runs out or download the [facility] jonathan for your smart phone. If you are out of refills, your pharmacy will contact contact the clinic.                                     MyChart Assistance [phone]                       Return in about 3 weeks (around 8/11/2021) for Recheck.    Stone Antoine MD, MD  [facility] TERRY Burns is a 31 year old who presents for the following health issues     HPI         ED/UC Followup:    Facility:  [facility]   Date of visit: 3 days ago   Reason for visit: blood in vomit with pain   Current Status: vomiting with blood, lots of pain - not getting any better      Pt started vomiting 3 days ago.  Noted small amount of bright red blood in her emesis, also almost black.  Emesis has continued to be dark, but not red blood.      Has had 10 episodes of emesis in the past 3 days.      C/o abdominal pain.  Notes some weight loss.  Little appetite.      Had some emesis this morning.      Also has pain in her chest if she eats or drinks.  No longer taking sucralfate, but is taking Protonix.      She notes increased  redness in her legs.  C/o pain all over.  Rest does help some.      She also reports cough, mucus spitting up, etc.      Seeing GI in August.  She's moving to [place] on 8 August, so she wants to be seen sooner.      Pt was seen in the ER 4 times in the past week.  She doesn't feel these helped with her symptoms.      Denies diarrhea.      Pt was diagnosed with Crohn's disease when she was 11.  Not currently on treatment for this.    She states that her Crohn's medication triggers psoriasis.        Review of Systems   Constitutional, HEENT, cardiovascular, pulmonary, gi and gu systems are negative, except as otherwise noted.  C/o fever, chills.  Coughing, spitting up stuff.        Objective    /86   Pulse 110   Temp 98  F (36.7  C) (Temporal)   Resp 16   SpO2 98%   There is no height or weight on file to calculate BMI.  Physical Exam   GENERAL: healthy, alert and no distress  HENT: normal cephalic/atraumatic, nose and mouth without ulcers or lesions, oropharynx clear, oral mucous membranes moist and some tongue erythema  NECK: no adenopathy, no asymmetry, masses, or scars and thyroid normal to palpation  RESP: lungs clear to auscultation - no rales, rhonchi or wheezes  CV: regular rate and rhythm, normal S1 S2, no S3 or S4, no murmur, click or rub, no peripheral edema and peripheral pulses strong  ABDOMEN: soft, nontender, no hepatosplenomegaly, no masses and bowel sounds normal  MS: no gross musculoskeletal defects noted, no edema  SKIN: slight flushing of her legs.    Orders Only on 07/08/2021   Component Date Value Ref Range Status     Specimen Description 07/08/2021 Feces   Final     Ova and Parasite Exam 07/08/2021 Routine parasitology exam negative   Final     Ova and Parasite Exam 07/08/2021    Final                    Value:Cryptosporidium, Cyclospora, and Microsporidia are not readily detected by this method. A   single negative specimen does not rule out parasitic infection.       Campylobacter group  by BENJI 07/08/2021 Not Detected  NDET^Not Detected Final     Salmonella species by BENJI 07/08/2021 Not Detected  NDET^Not Detected Final     Shigella species by BENJI 07/08/2021 Not Detected  NDET^Not Detected Final     Vibrio group by BENJI 07/08/2021 Not Detected  NDET^Not Detected Final     Rotavirus A by BENJI 07/08/2021 Not Detected  NDET^Not Detected Final     Shiga toxin 1 gene by BENJI 07/08/2021 Not Detected  NDET^Not Detected Final     Shiga toxin 2 gene by BENJI 07/08/2021 Not Detected  NDET^Not Detected Final     Norovirus I and II by BENJI 07/08/2021 Not Detected  NDET^Not Detected Final     Yersinia enterocolitica by BENJI 07/08/2021 Not Detected  NDET^Not Detected Final     Enteric pathogen comment 07/08/2021    Final                    Value:Testing performed by multiplexed, qualitative PCR using the [employer] Enteric   Pathogens Nucleic Acid Test. Results should not be used as the sole basis for diagnosis,   treatment, or other patient management decisions.      Comment: Positive results do not rule out co-infection with other organisms that are   not detected by this test, and may not be the sole or definitive cause of   patient illness.   Negative results in the setting of clinical illness compatible with   gastroenteritis may be due to infection by pathogens that are not detected by   this test or non-infectious causes such as ulcerative colitis, irritable bowel   syndrome, or Crohn's disease.   Note: Shiga toxin producing E. coli (STEC) typically harbor one or both genes   that encode for Shiga toxins 1 and 2.       Specimen Description 07/08/2021 Feces   Final     C Diff Toxin B PCR 07/08/2021 Negative  NEG^Negative Final    Comment: Negative: C. difficile target DNA sequences NOT detected, presumed negative   for C.difficile toxin B or the number of bacteria present may be below the   limit of detection for the test.  FDA approved assay performed using [employer] real-time PCR.  A negative  result does not exclude actual disease due to C. difficile and may   be due to improper collection, handling and storage of the specimen or the   number of organisms in the specimen is below the detection limit of the assay.         No results found. However, due to the size of the patient record, not all encounters were searched. Please check Results Review for a complete set of results.

## 2021-07-22 ENCOUNTER — NURSE TRIAGE (OUTPATIENT)
Dept: NURSING | Facility: CLINIC | Age: 31
End: 2021-07-22

## 2021-07-22 ASSESSMENT — ASTHMA QUESTIONNAIRES: ACT_TOTALSCORE: 17

## 2021-07-23 ENCOUNTER — TRANSFERRED RECORDS (OUTPATIENT)
Dept: HEALTH INFORMATION MANAGEMENT | Facility: CLINIC | Age: 31
End: 2021-07-23

## 2021-07-23 ENCOUNTER — NURSE TRIAGE (OUTPATIENT)
Dept: FAMILY MEDICINE | Facility: OTHER | Age: 31
End: 2021-07-23

## 2021-07-23 LAB
ALT SERPL-CCNC: 30 U/L (ref 8–45)
AST SERPL-CCNC: 18 U/L (ref 5–41)
CREATININE (EXTERNAL): 0.8 MG/DL (ref 0.57–1.11)
CREATININE (EXTERNAL): 0.92 MG/DL (ref 0.57–1.11)
GFR ESTIMATED (EXTERNAL): >60 ML/MIN/1.73M(2)
GFR ESTIMATED (EXTERNAL): >60 ML/MIN/1.73M(2)
GLUCOSE (EXTERNAL): 86 MG/DL (ref 70–100)
GLUCOSE (EXTERNAL): 92 MG/DL (ref 70–100)
POTASSIUM (EXTERNAL): 3.7 MMOL/L (ref 3.5–5.1)
POTASSIUM (EXTERNAL): 4.7 MMOL/L (ref 3.5–5.1)

## 2021-07-23 NOTE — PROGRESS NOTES
Assessment & Plan     ICD-10-CM    1. Adjustment disorder with mixed anxiety and depressed mood  F43.23 DULoxetine (CYMBALTA) 30 MG capsule     buPROPion (WELLBUTRIN XL) 300 MG 24 hr tablet     ARIPiprazole (ABILIFY) 30 MG tablet   2. Hypertension goal BP (blood pressure) < 130/80  I10 metoprolol succinate ER (TOPROL-XL) 50 MG 24 hr tablet   3. Gastroesophageal reflux disease without esophagitis  K21.9 pantoprazole (PROTONIX) 40 MG EC tablet   4. Hematemesis with nausea  K92.0 ondansetron (ZOFRAN-ODT) 4 MG ODT tab   5. Mild major depression (H)  F32.0 buPROPion (WELLBUTRIN XL) 300 MG 24 hr tablet     busPIRone (BUSPAR) 15 MG tablet     clonazePAM (KLONOPIN) 1 MG tablet   6. Bipolar disorder, current episode mixed, severe, without psychotic features (H)  F31.63 buPROPion (WELLBUTRIN XL) 300 MG 24 hr tablet     busPIRone (BUSPAR) 15 MG tablet   7. Chronic bilateral low back pain with left-sided sciatica  M54.42 gabapentin (NEURONTIN) 300 MG capsule    G89.29 cyclobenzaprine (FLEXERIL) 10 MG tablet   8. Anxiety  F41.9 ARIPiprazole (ABILIFY) 30 MG tablet     clonazePAM (KLONOPIN) 1 MG tablet   9. Lymphedema  I89.0 furosemide (LASIX) 20 MG tablet   10. Mild persistent asthma without complication  J45.30 albuterol (PROVENTIL) (2.5 MG/3ML) 0.083% neb solution     albuterol (PROAIR HFA/PROVENTIL HFA/VENTOLIN HFA) 108 (90 Base) MCG/ACT inhaler   11. Bee sting allergy  Z91.030 EPINEPHrine (ANY BX GENERIC EQUIV) 0.3 MG/0.3ML injection 2-pack   12. Anal fissure  K60.2 diltiazem 2% in PLO gel     - Patient moving to [place] on Friday, needing all medications reviewed     - Reviewed all medications at length including use and side effects     Refilled as needed   -  reviewed      Klonopin last filled on 7/12/21      Discussed can not and will not fill before 8/12/21      I will give her a paper script with that date that she can fill there, but advised that might not be able to fill with out of state license (mine) and/or  insurance    - Needs to establish with new PCP, GI specialist, psychiatrist, and pain clinic once there and has new insurance     - Wished patient best of luck on her new adventure       Review of the result(s) of each unique test - None   Diagnosis or treatment significantly limited by social determinants of health - Depression     30 minutes spent on the date of the encounter doing chart review, history and exam, documentation and further activities as noted above    The patient indicates understanding of these issues and agrees with the plan.    Due to language barrier, an  was present during the history-taking and subsequent discussion (and for part of the physical exam) with this patient.    LOVE Bishop [facility] TERRY Burns is a 31 year old who presents for the following health issues  accompanied by her :    HPI     Hypertension Follow-up      Do you check your blood pressure regularly outside of the clinic? No     Are you following a low salt diet? Yes    Are your blood pressures ever more than 140 on the top number (systolic) OR more   than 90 on the bottom number (diastolic), for example 140/90? No    Depression and Anxiety Follow-Up    How are you doing with your depression since your last visit? Up and down- better if taking medication     How are you doing with your anxiety since your last visit?  Up and down     Are you having other symptoms that might be associated with depression or anxiety? Yes:  depressed, nervous, anxious    Have you had a significant life event? OTHER: moving, packing, finding new friends, new provider      Do you have any concerns with your use of alcohol or other drugs? No    Social History     Tobacco Use     Smoking status: Current Every Day Smoker     Packs/day: 0.25     Years: 5.00     Pack years: 1.25     Types: Cigarettes     Smokeless tobacco: Never Used   Vaping Use     Vaping Use:  Some days     Substances: CBD     Devices: Disposable, Pre-filled or refillable cartridge   Substance Use Topics     Alcohol use: Not Currently     Comment: Not since last July 2018     Drug use: Not Currently     Types: Marijuana     Comment: Medical Marijuana currently-- More CBD     PHQ 1/5/2021 5/12/2021 7/26/2021   PHQ-9 Total Score 2 9 5   Q9: Thoughts of better off dead/self-harm past 2 weeks Not at all Not at all Not at all   PHQ-9 External Data - - -     JULIETH-7 SCORE 1/5/2021 5/12/2021 7/26/2021   Total Score - - -   Total Score - 9 (mild anxiety) -   Total Score 10 9 7     Last PHQ-9 7/26/2021   1.  Little interest or pleasure in doing things 1   2.  Feeling down, depressed, or hopeless 1   3.  Trouble falling or staying asleep, or sleeping too much 0   4.  Feeling tired or having little energy 3   5.  Poor appetite or overeating 0   6.  Feeling bad about yourself 0   7.  Trouble concentrating 0   8.  Moving slowly or restless 0   Q9: Thoughts of better off dead/self-harm past 2 weeks 0   PHQ-9 Total Score 5   Difficulty at work, home, or with people Somewhat difficult     JULIETH-7  7/26/2021   1. Feeling nervous, anxious, or on edge 3   2. Not being able to stop or control worrying 1   3. Worrying too much about different things 1   4. Trouble relaxing 1   5. Being so restless that it is hard to sit still 1   6. Becoming easily annoyed or irritable 0   7. Feeling afraid, as if something awful might happen 0   JULIETH-7 Total Score 7   If you checked any problems, how difficult have they made it for you to do your work, take care of things at home, or get along with other people? Somewhat difficult         Asthma Follow-Up    Was ACT completed today?    Yes    ACT Total Scores 7/21/2021   ACT TOTAL SCORE -   ASTHMA ER VISITS -   ASTHMA HOSPITALIZATIONS -   ACT TOTAL SCORE (Goal Greater than or Equal to 20) 17   In the past 12 months, how many times did you visit the emergency room for your asthma without being  "admitted to the hospital? 0   In the past 12 months, how many times were you hospitalized overnight because of your asthma? 0   \    Migraine     Since your last clinic visit, how have your headaches changed?  Improved    How often are you getting headaches or migraines? Once or twice yearly      Are you able to do normal daily activities when you have a migraine? No    Are you taking rescue/relief medications? (Select all that apply) sumatriptan (Imitrex)    How helpful is your rescue/relief medication?  I get some relief    Are you taking any medications to prevent migraines? (Select all that apply)  No    In the past 4 weeks, how often have you gone to urgent care or the emergency room because of your headaches?  0    Chronic/Recurring Back Pain Follow Up      Where is your back pain located? (Select all that apply) low back bilateral, middle of back bilateral, upper back bilateral and neck bilateral    How would you describe your back pain?  dull ache and pulling    Where does your back pain spread? the right and left  knee and the right and left foot- numbness and tingling     Since your last clinic visit for back pain, how has your pain changed? always present, but gets better and worse    Does your back pain interfere with your job? YES    Since your last visit, have you tried any new treatment? No      Review of Systems   Constitutional, HEENT, cardiovascular, pulmonary, gi and gu systems are negative, except as otherwise noted.      Objective    /84   Pulse 117   Temp 98.5  F (36.9  C) (Temporal)   Resp 20   Ht 1.575 m (5' 2.01\")   Wt 83.3 kg (183 lb 9.6 oz)   SpO2 97%   BMI 33.57 kg/m    Body mass index is 33.57 kg/m .  Physical Exam   GENERAL APPEARANCE: healthy, alert and no distress  EYES: Eyes grossly normal to inspection, PERRLA, conjunctivae and sclerae without injection or discharge, EOM intact   RESP: Lungs clear to auscultation - no rales, rhonchi or wheezes    CV: Regular rates and " rhythm, normal S1 S2, no S3 or S4, no murmur, click or rub, no peripheral edema and peripheral pulses strong and symmetric bilaterally   MS: No musculoskeletal defects are noted and gait is age appropriate without ataxia   SKIN: No suspicious lesions or rashes, hydration status appears adeuqate with normal skin turgor   PSYCH: Alert and oriented x3; speech- coherent , normal rate and volume; able to articulate logical thoughts, able to abstract reason, no tangential thoughts, no hallucinations or delusions, mentation appears normal, Mood is euthymic. Affect is appropriate for this mood state and bright. Thought content is free of suicidal ideation, hallucinations, and delusions. Dress is adequate and upkept. Eye contact is good during conversation.       Diagnostics  Reviewed in Epic

## 2021-07-23 NOTE — TELEPHONE ENCOUNTER
She is in bad pain - she can barely able move her neck    She found out she had pneumonia last night.    She had the neck pain when she was in the ER.    She states her pain is unbearable.    She was not given anything for her pain last night.    Her Oxygen is at 92%    She tried flexeril, and tylenol.    She is in tears right now.    She has shooting pain in her neck.- this is happening often.    It feels like her back is hot to the touch.    Ice and heat did not work.    She has gotten pneumonia several  Times this year.    The pain is keeping her from moving her neck.    Per protocol patient should go back to the ER with her pain.    Patient agrees with the plan.    Marie Nichole RN on 7/23/2021 at 3:53 PM        Reason for Disposition    Stiff neck (can't touch chin to chest) and fever    Additional Information    Negative: Shock suspected (e.g., cold/pale/clammy skin, too weak to stand, low BP, rapid pulse)    Negative: Similar pain previously and it was from 'heart attack'    Negative: Similar pain previously from 'angina' and not relieved by nitroglycerin    Negative: Difficult to awaken or acting confused (e.g., disoriented, slurred speech)    Negative: Sounds like a life-threatening emergency to the triager    Negative: Followed an injury to neck (e.g., MVA, sports, impact or collision)    Negative: Chest pain    Negative: Lymph node in the neck is swollen or painful to the touch    Negative: Sore throat is the main symptom    Negative: Difficulty breathing or unusual sweating (e.g., sweating without exertion)    Negative: Chest pain lasting longer than 5 minutes    Negative: Stiff neck (can't touch chin to chest) and has headache    Protocols used: NECK PAIN OR [id number]

## 2021-07-23 NOTE — TELEPHONE ENCOUNTER
Pt called in states she has chest pain.  The Pain is on her left chest.  The pain is go to arm shoulder jaw, neck.  The pain started 1 hour ago.  The pain is constant.  The pain is 8/10 on th scale.  Has history of heart disease.  Pt father  because of heart disease.  Has history of lung disease.  Pt 120 Pulse is 124 at this time.  No dizziness, no sweating.  difficulty breathing when deep breath.  The disposition is to call 911.  Care advice given per protocol.  Patient agrees with care advice given.   Agreed to call back if he has additional symptoms or questions.       Domenic Baker [place] Nurse Advisor 2021 10:20 PM      Reason for Disposition    [1] Chest pain lasts > 5 minutes AND [2] age > 30 AND [3] at least one cardiac risk factor (i.e., hypertension, diabetes, obesity, smoker or strong family history of heart disease)    Additional Information    Negative: [1] Chest pain lasts > 5 minutes AND [2] age > 50    Negative: [1] Chest pain lasts > 5 minutes AND [2] described as crushing, pressure-like, or heavy    Negative: [1] Chest pain lasts > 5 minutes AND [2] history of heart disease  (i.e., heart attack, bypass surgery, angina, angioplasty, CHF; not just a heart murmur)    Negative: Shock suspected (e.g., cold/pale/clammy skin, too weak to stand, low BP, rapid pulse)    Negative: Difficult to awaken or acting confused (e.g., disoriented, slurred speech)    Negative: Severe difficulty breathing (e.g., struggling for each breath, speaks in single words)    Protocols used: CHEST PAIN-A-

## 2021-07-26 ENCOUNTER — OFFICE VISIT (OUTPATIENT)
Dept: FAMILY MEDICINE | Facility: OTHER | Age: 31
End: 2021-07-26
Payer: MEDICARE

## 2021-07-26 VITALS
RESPIRATION RATE: 20 BRPM | WEIGHT: 183.6 LBS | BODY MASS INDEX: 33.79 KG/M2 | SYSTOLIC BLOOD PRESSURE: 122 MMHG | DIASTOLIC BLOOD PRESSURE: 84 MMHG | HEIGHT: 62 IN | OXYGEN SATURATION: 97 % | HEART RATE: 117 BPM | TEMPERATURE: 98.5 F

## 2021-07-26 DIAGNOSIS — I10 HYPERTENSION GOAL BP (BLOOD PRESSURE) < 130/80: ICD-10-CM

## 2021-07-26 DIAGNOSIS — K60.2 ANAL FISSURE: ICD-10-CM

## 2021-07-26 DIAGNOSIS — M54.42 CHRONIC BILATERAL LOW BACK PAIN WITH LEFT-SIDED SCIATICA: ICD-10-CM

## 2021-07-26 DIAGNOSIS — K92.0 HEMATEMESIS WITH NAUSEA: ICD-10-CM

## 2021-07-26 DIAGNOSIS — F41.9 ANXIETY: ICD-10-CM

## 2021-07-26 DIAGNOSIS — F43.23 ADJUSTMENT DISORDER WITH MIXED ANXIETY AND DEPRESSED MOOD: Primary | ICD-10-CM

## 2021-07-26 DIAGNOSIS — K21.9 GASTROESOPHAGEAL REFLUX DISEASE WITHOUT ESOPHAGITIS: ICD-10-CM

## 2021-07-26 DIAGNOSIS — Z91.030 BEE STING ALLERGY: ICD-10-CM

## 2021-07-26 DIAGNOSIS — J45.30 MILD PERSISTENT ASTHMA WITHOUT COMPLICATION: ICD-10-CM

## 2021-07-26 DIAGNOSIS — F31.63 BIPOLAR DISORDER, CURRENT EPISODE MIXED, SEVERE, WITHOUT PSYCHOTIC FEATURES (H): ICD-10-CM

## 2021-07-26 DIAGNOSIS — G89.29 CHRONIC BILATERAL LOW BACK PAIN WITH LEFT-SIDED SCIATICA: ICD-10-CM

## 2021-07-26 DIAGNOSIS — F32.0 MILD MAJOR DEPRESSION (H): ICD-10-CM

## 2021-07-26 DIAGNOSIS — I89.0 LYMPHEDEMA: ICD-10-CM

## 2021-07-26 PROCEDURE — 99214 OFFICE O/P EST MOD 30 MIN: CPT | Performed by: PHYSICIAN ASSISTANT

## 2021-07-26 PROCEDURE — 96127 BRIEF EMOTIONAL/BEHAV ASSMT: CPT | Mod: 59 | Performed by: PHYSICIAN ASSISTANT

## 2021-07-26 PROCEDURE — T1013 SIGN LANG/ORAL INTERPRETER: HCPCS | Mod: U3 | Performed by: PHYSICIAN ASSISTANT

## 2021-07-26 RX ORDER — CLONAZEPAM 1 MG/1
1 TABLET ORAL 2 TIMES DAILY PRN
Qty: 60 TABLET | Refills: 0 | Status: SHIPPED | OUTPATIENT
Start: 2021-08-12 | End: 2021-08-05

## 2021-07-26 RX ORDER — DULOXETIN HYDROCHLORIDE 30 MG/1
60 CAPSULE, DELAYED RELEASE ORAL DAILY
Qty: 90 CAPSULE | Refills: 1 | Status: SHIPPED | OUTPATIENT
Start: 2021-07-26 | End: 2021-09-08

## 2021-07-26 RX ORDER — METOPROLOL SUCCINATE 50 MG/1
50 TABLET, EXTENDED RELEASE ORAL DAILY
Qty: 90 TABLET | Refills: 3 | Status: ON HOLD | OUTPATIENT
Start: 2021-07-26 | End: 2021-08-29

## 2021-07-26 RX ORDER — CYCLOBENZAPRINE HCL 10 MG
10 TABLET ORAL 3 TIMES DAILY PRN
Qty: 90 TABLET | Refills: 3 | Status: SHIPPED | OUTPATIENT
Start: 2021-07-26 | End: 2021-08-12 | Stop reason: SINTOL

## 2021-07-26 RX ORDER — BUSPIRONE HYDROCHLORIDE 15 MG/1
15 TABLET ORAL 2 TIMES DAILY
Qty: 60 TABLET | Refills: 5 | Status: SHIPPED | OUTPATIENT
Start: 2021-07-26 | End: 2021-08-23

## 2021-07-26 RX ORDER — DOXYCYCLINE 100 MG/1
100 CAPSULE ORAL 2 TIMES DAILY
COMMUNITY
Start: 2021-07-23 | End: 2021-08-02

## 2021-07-26 RX ORDER — ARIPIPRAZOLE 30 MG/1
30 TABLET ORAL AT BEDTIME
Qty: 30 TABLET | Refills: 1 | Status: SHIPPED | OUTPATIENT
Start: 2021-07-26 | End: 2022-01-07

## 2021-07-26 RX ORDER — BUPROPION HYDROCHLORIDE 300 MG/1
300 TABLET ORAL EVERY MORNING
Qty: 90 TABLET | Refills: 3 | Status: SHIPPED | OUTPATIENT
Start: 2021-07-26 | End: 2022-01-31

## 2021-07-26 RX ORDER — ALBUTEROL SULFATE 0.83 MG/ML
2.5 SOLUTION RESPIRATORY (INHALATION) EVERY 6 HOURS PRN
Qty: 500 ML | Refills: 3 | Status: ON HOLD | OUTPATIENT
Start: 2021-07-26 | End: 2021-08-29

## 2021-07-26 RX ORDER — GABAPENTIN 300 MG/1
300 CAPSULE ORAL 3 TIMES DAILY
Qty: 270 CAPSULE | Refills: 3 | Status: SHIPPED | OUTPATIENT
Start: 2021-07-26 | End: 2021-12-31

## 2021-07-26 RX ORDER — PANTOPRAZOLE SODIUM 40 MG/1
40 TABLET, DELAYED RELEASE ORAL 2 TIMES DAILY
Qty: 180 TABLET | Refills: 3 | Status: SHIPPED | OUTPATIENT
Start: 2021-07-26

## 2021-07-26 RX ORDER — FUROSEMIDE 20 MG
20 TABLET ORAL DAILY PRN
Qty: 30 TABLET | Refills: 4 | Status: ON HOLD | OUTPATIENT
Start: 2021-07-26 | End: 2021-08-29

## 2021-07-26 RX ORDER — ALBUTEROL SULFATE 90 UG/1
2 AEROSOL, METERED RESPIRATORY (INHALATION) EVERY 6 HOURS PRN
Qty: 18 G | Refills: 3 | Status: SHIPPED | OUTPATIENT
Start: 2021-07-26

## 2021-07-26 RX ORDER — ONDANSETRON 4 MG/1
4 TABLET, ORALLY DISINTEGRATING ORAL EVERY 8 HOURS PRN
Qty: 60 TABLET | Refills: 0 | Status: ON HOLD | OUTPATIENT
Start: 2021-07-26 | End: 2021-09-01

## 2021-07-26 RX ORDER — EPINEPHRINE 0.3 MG/.3ML
0.3 INJECTION SUBCUTANEOUS
Qty: 0.6 ML | Refills: 3 | Status: SHIPPED | OUTPATIENT
Start: 2021-07-26

## 2021-07-26 ASSESSMENT — ANXIETY QUESTIONNAIRES
GAD7 TOTAL SCORE: 7
IF YOU CHECKED OFF ANY PROBLEMS ON THIS QUESTIONNAIRE, HOW DIFFICULT HAVE THESE PROBLEMS MADE IT FOR YOU TO DO YOUR WORK, TAKE CARE OF THINGS AT HOME, OR GET ALONG WITH OTHER PEOPLE: SOMEWHAT DIFFICULT
2. NOT BEING ABLE TO STOP OR CONTROL WORRYING: SEVERAL DAYS
1. FEELING NERVOUS, ANXIOUS, OR ON EDGE: NEARLY EVERY DAY
5. BEING SO RESTLESS THAT IT IS HARD TO SIT STILL: SEVERAL DAYS
6. BECOMING EASILY ANNOYED OR IRRITABLE: NOT AT ALL
7. FEELING AFRAID AS IF SOMETHING AWFUL MIGHT HAPPEN: NOT AT ALL
3. WORRYING TOO MUCH ABOUT DIFFERENT THINGS: SEVERAL DAYS

## 2021-07-26 ASSESSMENT — PATIENT HEALTH QUESTIONNAIRE - PHQ9
SUM OF ALL RESPONSES TO PHQ QUESTIONS 1-9: 5
5. POOR APPETITE OR OVEREATING: SEVERAL DAYS

## 2021-07-26 ASSESSMENT — PAIN SCALES - GENERAL: PAINLEVEL: NO PAIN (0)

## 2021-07-26 ASSESSMENT — MIFFLIN-ST. JEOR: SCORE: 1496.18

## 2021-07-27 ENCOUNTER — TELEPHONE (OUTPATIENT)
Dept: FAMILY MEDICINE | Facility: OTHER | Age: 31
End: 2021-07-27

## 2021-07-27 ASSESSMENT — ANXIETY QUESTIONNAIRES: GAD7 TOTAL SCORE: 7

## 2021-07-27 NOTE — TELEPHONE ENCOUNTER
PRIOR AUTHORIZATION DENIED - COMPLETED VIA EPA     Medication: albuterol (PROVENTIL) (2.5 MG/3ML) 0.083% neb solution - DENIED     Denial Date:      Denial Rational: Drugs used in a nebulizer at your home are covered under Medicare Part B         Appeal Information:

## 2021-07-30 ENCOUNTER — TELEPHONE (OUTPATIENT)
Dept: FAMILY MEDICINE | Facility: OTHER | Age: 31
End: 2021-07-30

## 2021-07-30 ENCOUNTER — VIRTUAL VISIT (OUTPATIENT)
Dept: FAMILY MEDICINE | Facility: OTHER | Age: 31
End: 2021-07-30
Payer: MEDICARE

## 2021-07-30 DIAGNOSIS — R07.9 CHEST PAIN, UNSPECIFIED TYPE: ICD-10-CM

## 2021-07-30 DIAGNOSIS — M54.2 NECK PAIN: Primary | ICD-10-CM

## 2021-07-30 PROCEDURE — 99442 PR PHYSICIAN TELEPHONE EVALUATION 11-20 MIN: CPT | Mod: 95 | Performed by: PHYSICIAN ASSISTANT

## 2021-07-30 NOTE — TELEPHONE ENCOUNTER
Can we please triage her, she just saw CDL and it looks like the last ER visit I can see was prior to her seeing CDL and I see no mention of pneumonia.     Wilson Ibarra PA-C

## 2021-07-30 NOTE — PROGRESS NOTES
Katy is a 31 year old who is being evaluated via a billable telephone visit.      What phone number would you like to be contacted at? [phone]  How would you like to obtain your AVS? Billie    Assessment & Plan     Neck pain  Chest pain, unspecified type  Reviewed the ER visit notes, labs and xray.   It appears at some point Katy started to have noted left sided hemidiaphragm elevation and lower atelectasis, I do not see any documentation of pneumonia recently on the left side.  She luckily has not had any fevers and her main concern is the increase in pain in her neck mainly but also the chest.  Discussed the antibiotic she is on is appropriate for potential pneumonia and that if she's having new symptoms she should be seen for evaluation. Initially asks to see CDL in clinic on Monday but then she is willing to see anyone and then we discussed management of her pain but continuing the antibiotics, Tylenol and her current medications which includes Gabapentin she wasn't thrilled with this as a plan and she requested to be seen this afternoon in clinic which there are no openings available, therefore advised ER but she doesn't want to go so recommended  and she was ok with this and was going to go to the  in [facility].  Discussed reassured that she was not having fevers, still able to move her neck, was on antibiotics therefore low suspicion for persistent or new infection, cannot rule out pulmonary embolism but did have this evaluated recently in the ED on 7/23 and it was negative.      Return in about 3 days (around 8/2/2021) for Recheck.    Options for treatment and follow-up care were reviewed with the patient and/or guardian. Patient and/or guardian engaged in the decision making process and verbalized understanding of the options discussed and agreed with the final plan.    Wilson Ibarra PA-C  [facility]    Alex Burns is a 31 year old who presents  for the following health issues  accompanied by her :    HPI     Started to feel sick again yesterday.   She feels lethargic, weak and tired.   Yesterday she felt almost like there was burning in the throat and esophagus.   She thinks she should have a different kind medication or more than 10 days of the antibiotic. She thinks it is called Doxycycline.   She can't remember much about the visit with Zach.    Slight stuffy nose.   Today she doesn't feel as bad. The pain she is experiencing is left sided, it is her lung on the left side.    Yesterday she couldn't move her neck. Today she can a little. It feels sore.   Yes having some sore throat.   Some coughing, I feel like there is mucus in the back of the throat she just can't get out.   Guaifenesin taking this. It kind of helps.     Denies fevers, runny nose, ear pain, diarrhea, vomiting, rashes.       Review of Systems   Constitutional, HEENT, cardiovascular, pulmonary, gi and gu systems are negative, except as otherwise noted.      Objective         Vitals:  No vitals were obtained today due to virtual visit.    Physical Exam   Remainder of exam unable to be completed due to telephone visits      Phone call duration: 15:50 minutes

## 2021-07-30 NOTE — TELEPHONE ENCOUNTER
This nurse called to discuss further with the patient.     Patient was recently seen at [facility] for pneumonia. The patient was unsure when. She was prescribed Doxycyline and has 4-5 pills left. The patient describes not being able to take a deep breath in and she has pain in her chest and neck. However, she is not struggling to breath or gasping for air. She mentioned she had a CT scan done while at the hospital. The results showed pneumonia and scarring of the left lung. This nurse dose not see any documentation of this. Patient is moving.     PLAN:   Routing to [facility] as an FYI    Informed the patient that with pneumonia it can take time to recover and take a full deep breathing. Informed her to rest, push fluids, and finish the medication.       EDYTA العليN, RN  [place]  July 30, 2021

## 2021-07-30 NOTE — Clinical Note
KENDRAI - She hasnt left yet, earliest would be next Saturday but then she requested to see you on the 9th, she was aware you didn't have openings.     Wilson Ibarra PA-C

## 2021-08-02 ENCOUNTER — TELEPHONE (OUTPATIENT)
Dept: FAMILY MEDICINE | Facility: OTHER | Age: 31
End: 2021-08-02

## 2021-08-02 ENCOUNTER — TRANSFERRED RECORDS (OUTPATIENT)
Dept: HEALTH INFORMATION MANAGEMENT | Facility: CLINIC | Age: 31
End: 2021-08-02

## 2021-08-02 ENCOUNTER — NURSE TRIAGE (OUTPATIENT)
Dept: NURSING | Facility: CLINIC | Age: 31
End: 2021-08-02

## 2021-08-02 LAB
CREATININE (EXTERNAL): 0.82 MG/DL (ref 0.57–1.11)
GFR ESTIMATED (EXTERNAL): >60 ML/MIN/1.73M(2)
GLUCOSE (EXTERNAL): 92 MG/DL (ref 70–100)
HBA1C MFR BLD: 4.9 %
POTASSIUM (EXTERNAL): 4.8 MMOL/L (ref 3.5–5.1)

## 2021-08-02 NOTE — TELEPHONE ENCOUNTER
"Patient called to clinic, was sent through RN line as priority call.   along with call.    Patient complaining of left arm numbness and pressure. Feeling very weak and lightheaded, no energy. Has not felt \"normal\" for the last few days.  Feels she is getting worse, feels confused, can't think straight.  Left arm, leg and foot feel numb and heavy and like there is pressure in them, can't walk.    Patient called to [facility] last night, was triaged by RN for very similar symptoms and was advised to call 911 or go to ED.   Patient did not go in, said she did not have a ride, was instructed not to drive self. Did not call 911.      Going to ED or calling 911 now was highly encouraged by writer. Patient asked if writer could call 911 on her behalf, stated she felt confused and couldn't think straight. Asked for us to call for her. She agreed to going in to ED. Confirmed address and phone number. Patient unlocked her front door for medic's to enter upon arrival.       Writer called 911 after disconnecting with patient. Transferred to medic unit closest to [place], where patient lives. Spoke with , update given and patient address. Patient wanted to make sure that medics were aware of a service animal in the house, has a dog, is gentle and friendly and to make sure they do not let her cat out the door when they come in. Noted that patient unlocked her front door for entry, will be expecting ambulance.   acknowledged all, is familiar with this patient and will update outbound medic team.  Nothing further discussed at this time, call was ended.      Ryann Mejía RN  [facility]        "

## 2021-08-02 NOTE — TELEPHONE ENCOUNTER
"Feeling a little weak for the last 4 days  Hard to get up   Worse tonight     Generalized all over weakness, fatigue and slight dizziness    Sick since April, just feeling under the weather  -doctor doesn't known what is going on   -pain in left neck and numbness and weakness through neck   -diagnosed with pneumonia and just finished treatments for that    Feels like low blood pressure  -had a problem with this once before  Blood pressure tends to be high  -takes blood pressure medication    Seen on 7/30 and was advised to go to , patient      Able to walk but legs feel weak like they will give out on her, \"it would be easy to fall\", holds onto the wall.     Pulse 119  Oxygen 97%    No fever  No headache    Triaged to a disposition of Call 911 EMS Now. Patient is agreeable.     COVID 19 Nurse Triage Plan/Patient Instructions    Please be aware that novel coronavirus (COVID-19) may be circulating in the community. If you develop symptoms such as fever, cough, or SOB or if you have concerns about the presence of another infection including coronavirus (COVID-19), please contact your health care provider or visit [email or web address].     Disposition/Instructions    Call to EMS/911 recommended. Follow protocol based instructions.     Bring Your Own Device:  Please also bring your smart device(s) (smart phones, tablets, laptops) and their charging cables for your personal use and to communicate with your care team during your visit.    Thank you for taking steps to prevent the spread of this virus.  o Limit your contact with others.  o Wear a simple mask to cover your cough.  o Wash your hands well and often.    Resources    [facility]: About COVID-19: [email or web address]    CDC: What to Do If You're Sick: www.cdc.gov/coronavirus/2019-ncov/about/steps-when-sick.html    CDC: Ending Home Isolation: www.cdc.gov/coronavirus/2019-ncov/hcp/disposition-in-home-patients.html     CDC: Caring for " Someone: www.cdc.gov/coronavirus/2019-ncov/if-you-are-sick/care-for-someone.html     [facility]: Interim Guidance for Hospital Discharge to Home: www.health.[facility].mn.us/diseases/coronavirus/hcp/hospdischarge.pdf    [facility] clinical trials (COVID-19 research studies): [email or web address].[facility]/um-clinical-trials     Below are the COVID-19 hotlines at the [place] Department of Health ([facility]). Interpreters are available.   o For health questions: Call 825-760-8592 or 1-307.317.4352 (7 a.m. to 7 p.m.)  o For questions about schools and childcare: Call 193-322-0597 or 1-146.226.7325 (7 a.m. to 7 p.m.)     Reason for Disposition    Sounds like a life-threatening emergency to the triager    Additional Information    Negative: Severe difficulty breathing (e.g., struggling for each breath, speaks in single words)    Negative: Shock suspected (e.g., cold/pale/clammy skin, too weak to stand, low BP, rapid pulse)    Negative: Difficult to awaken or acting confused (e.g., disoriented, slurred speech)    Negative: [1] Fainted > 15 minutes ago AND [2] still feels too weak or dizzy to stand    Negative: [1] SEVERE weakness (i.e., unable to walk or barely able to walk, requires support) AND [2] new onset or worsening    Protocols used: WEAKNESS (GENERALIZED) AND FATIGUE-A-JAMAL Villalpando RN on 8/2/2021 at 12:40 AM

## 2021-08-03 ENCOUNTER — TRANSFERRED RECORDS (OUTPATIENT)
Dept: HEALTH INFORMATION MANAGEMENT | Facility: CLINIC | Age: 31
End: 2021-08-03

## 2021-08-03 ENCOUNTER — MEDICAL CORRESPONDENCE (OUTPATIENT)
Dept: HEALTH INFORMATION MANAGEMENT | Facility: CLINIC | Age: 31
End: 2021-08-03

## 2021-08-04 ENCOUNTER — TELEPHONE (OUTPATIENT)
Dept: FAMILY MEDICINE | Facility: OTHER | Age: 31
End: 2021-08-04

## 2021-08-04 ENCOUNTER — TRANSFERRED RECORDS (OUTPATIENT)
Dept: HEALTH INFORMATION MANAGEMENT | Facility: CLINIC | Age: 31
End: 2021-08-04

## 2021-08-04 ENCOUNTER — MYC MEDICAL ADVICE (OUTPATIENT)
Dept: FAMILY MEDICINE | Facility: OTHER | Age: 31
End: 2021-08-04

## 2021-08-04 DIAGNOSIS — M54.42 CHRONIC BILATERAL LOW BACK PAIN WITH LEFT-SIDED SCIATICA: Primary | ICD-10-CM

## 2021-08-04 DIAGNOSIS — F41.9 ANXIETY: ICD-10-CM

## 2021-08-04 DIAGNOSIS — F32.0 MILD MAJOR DEPRESSION (H): ICD-10-CM

## 2021-08-04 DIAGNOSIS — G89.29 CHRONIC BILATERAL LOW BACK PAIN WITH LEFT-SIDED SCIATICA: Primary | ICD-10-CM

## 2021-08-04 NOTE — TELEPHONE ENCOUNTER
Patient calling through  services.  Katy is looking to see if we received from that fax from [facility].  They are recommending she has a MRI of lumbar spine without contrast.  She is hoping that you would order without needing to see her as she is leaving for [place] Friday.     Per care everywhere notes from [facility] below.    Plan:  Admit to neuro  ASA  PT/OT/Slp  Hospitalist consulted for medical management. Low suspicion for stroke  Nursing has obtained records for pain stimulator. We will proceed with MRI head wo contrast to assess for infarct. I have discussed with neurosurgery and they recommend MRI lumbar spine without contrast  24 hour telemetry. 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  Patient does have float visit scheduled for Thursday.    Next 5 appointments (look out 90 days)    Aug 05, 2021  3:00 PM  Nurse Only with NL MA ERC, ASL IS  [facility] [address]  [place] [id number] [phone]          Routing to provider to review and advise.    Iggy Bernstein, EDYTAN, RN, PHN  [facility] ~ Registered Nurse  Clinic Triage ~ [place] [facility] [place]  August 4, 2021

## 2021-08-04 NOTE — TELEPHONE ENCOUNTER
[facility]  Patient called to request a paper copy of generic referral for MRI and a paper copy of the Order for the MRI.      MRI for low back and the Brain.    Hospital and doctor thinks may have had a stroke.       Is going to [facility] does not know the name of facility will not know until gets there.       patient leaving Friday early morning would like to  prescription on Thursday evening. Needs an early fill for the clonazepam because out of state pharmacies will not fill out of state prescriptions for controlled substances.     patient would like to thank doctor and nurses who has taken such good care of her over the last year.    Routing to PCP to review and advise.    Lisa Patterson RN  [facility]

## 2021-08-04 NOTE — TELEPHONE ENCOUNTER
I placed the order but we may not be able to get this done before she leaves. She has a spinal cord stimulator and the last time we tried to do an MRI I believe we had to use ORLY Rosario PA-C  [facility]

## 2021-08-05 ENCOUNTER — MYC MEDICAL ADVICE (OUTPATIENT)
Dept: FAMILY MEDICINE | Facility: OTHER | Age: 31
End: 2021-08-05

## 2021-08-05 RX ORDER — CLONAZEPAM 1 MG/1
1 TABLET ORAL 2 TIMES DAILY PRN
Qty: 60 TABLET | Refills: 0 | Status: SHIPPED | OUTPATIENT
Start: 2021-08-12 | End: 2021-08-23

## 2021-08-05 NOTE — TELEPHONE ENCOUNTER
I have sent RX for Klonopin for early fill, but is up to pharmacy and insurance.     Print MRI order for patient.     Zach Rosario PA-C  [facility]

## 2021-08-07 ENCOUNTER — TRANSFERRED RECORDS (OUTPATIENT)
Dept: HEALTH INFORMATION MANAGEMENT | Facility: CLINIC | Age: 31
End: 2021-08-07

## 2021-08-07 LAB
ALT SERPL-CCNC: 27 U/L (ref 8–45)
AST SERPL-CCNC: 20 U/L (ref 5–41)
CREATININE (EXTERNAL): 0.84 MG/DL (ref 0.57–1.11)
GFR ESTIMATED (EXTERNAL): >60 ML/MIN/1.73M(2)
GLUCOSE (EXTERNAL): 75 MG/DL (ref 70–100)
INR (EXTERNAL): 1 (ref 0.9–1.1)
POTASSIUM (EXTERNAL): 3.7 MMOL/L (ref 3.5–5.1)

## 2021-08-09 ENCOUNTER — VIRTUAL VISIT (OUTPATIENT)
Dept: GASTROENTEROLOGY | Facility: CLINIC | Age: 31
End: 2021-08-09
Payer: MEDICARE

## 2021-08-09 ENCOUNTER — TRANSFERRED RECORDS (OUTPATIENT)
Dept: HEALTH INFORMATION MANAGEMENT | Facility: CLINIC | Age: 31
End: 2021-08-09

## 2021-08-09 ENCOUNTER — TELEPHONE (OUTPATIENT)
Dept: FAMILY MEDICINE | Facility: OTHER | Age: 31
End: 2021-08-09

## 2021-08-09 DIAGNOSIS — Z53.9 ERRONEOUS ENCOUNTER--DISREGARD: Primary | ICD-10-CM

## 2021-08-09 LAB
CREATININE (EXTERNAL): 0.87 MG/DL (ref 0.57–1.11)
GFR ESTIMATED (EXTERNAL): >60 ML/MIN/1.73M(2)
GLUCOSE (EXTERNAL): 84 MG/DL (ref 70–100)
POTASSIUM (EXTERNAL): 3.9 MMOL/L (ref 3.5–5.1)

## 2021-08-09 PROCEDURE — 99207 PR NO CHARGE LOS: CPT | Performed by: PHYSICIAN ASSISTANT

## 2021-08-09 NOTE — TELEPHONE ENCOUNTER
Spoke with patient through  services. Has a terrible headache.  Feels weak and tired.  Hemoglobin is at 10,.  RBC are low and WBC are high.  Was admitted to [facility].     Was told that she has pneumonia for the past 3 months.    She is tired of the pain. She wanted to know what writer thought, adivsed that she would need a follow up visit with her primary.     States that she did not move with her mom and brother.    She would like a referral for head and lumbar MRI  would like it sent to [facility].        Will route to CDL for work in or further advice.    Iggy Bernstein, EDYTAN, RN, PHN  [facility] ~ Registered Nurse  Clinic Triage ~ [place] [facility] [place]  August 9, 2021

## 2021-08-09 NOTE — TELEPHONE ENCOUNTER
Left hospital AMA this morning.     Advise needs to be admitted and follow the treatment plan. If won't do this then needs appointment for advice from PCP or another provider. This is not appropriate for discussion of pain and pneumonia.     See many, many encounter about her MRI. I have already ordered it. May need to be faxed.     Zach Tomlinson-LOVE Moss  [facility]

## 2021-08-09 NOTE — PROGRESS NOTES
Katy is a 31 year old who is being evaluated via a billable telephone visit.      What phone number would you like to be contacted at? [phone]  How would you like to obtain your AVS? [place]  Phone call duration:  minutes  Cesar Miller CMA    This encounter was opened in error. Please disregard.

## 2021-08-10 ENCOUNTER — TRANSFERRED RECORDS (OUTPATIENT)
Dept: HEALTH INFORMATION MANAGEMENT | Facility: CLINIC | Age: 31
End: 2021-08-10

## 2021-08-10 ENCOUNTER — TELEPHONE (OUTPATIENT)
Dept: FAMILY MEDICINE | Facility: OTHER | Age: 31
End: 2021-08-10

## 2021-08-10 LAB
ALT SERPL-CCNC: 18 U/L (ref 8–45)
AST SERPL-CCNC: 14 U/L (ref 5–41)
CREATININE (EXTERNAL): 0.78 MG/DL (ref 0.57–1.11)
GFR ESTIMATED (EXTERNAL): >60 ML/MIN/1.73M(2)
GLUCOSE (EXTERNAL): 98 MG/DL (ref 70–100)
POTASSIUM (EXTERNAL): 3.7 MMOL/L (ref 3.5–5.1)

## 2021-08-10 NOTE — TELEPHONE ENCOUNTER
Pt is calling thru relay service. She wants to see Myron Tomlinson for bruises on her arms and legs.  She was recently in hospital with pneumonia.

## 2021-08-10 NOTE — TELEPHONE ENCOUNTER
Duplicate. See other note routed to triage.     Zach Rosario PA-C  [facility]

## 2021-08-10 NOTE — TELEPHONE ENCOUNTER
TEAM: Please fax the MRI order to [facility]. Which ever location is closest to [place].     Patient is calling back to speak with RN. Patient stated that the blueness on her calf and leg has worsen since we spoke earlier today. The  describes a bug bite that is deep red in color, warm, and about 3 to 4 ins across.     PLAN:   This RN discussed that she needs to go to the hospital and she needs to stay there if that is what they recommend. I informed her there they have the treatment they need to help her feel better. Patient agreed with the RN. She informed me that she will stay there if they request her to. She will also keep Zach and RN updated.       Jose Finney, EDYTAN, RN, PHN  [place]  August 10, 2021

## 2021-08-11 ENCOUNTER — HOSPITAL ENCOUNTER (EMERGENCY)
Facility: CLINIC | Age: 31
Discharge: HOME | End: 2021-08-11
Attending: PHYSICIAN ASSISTANT | Admitting: PHYSICIAN ASSISTANT
Payer: MEDICARE

## 2021-08-11 ENCOUNTER — NURSE TRIAGE (OUTPATIENT)
Dept: NURSING | Facility: CLINIC | Age: 31
End: 2021-08-11

## 2021-08-11 VITALS
HEART RATE: 114 BPM | BODY MASS INDEX: 35.66 KG/M2 | TEMPERATURE: 98.2 F | DIASTOLIC BLOOD PRESSURE: 92 MMHG | SYSTOLIC BLOOD PRESSURE: 134 MMHG | WEIGHT: 195 LBS | OXYGEN SATURATION: 96 % | RESPIRATION RATE: 18 BRPM

## 2021-08-11 DIAGNOSIS — T14.8XXA BRUISING: ICD-10-CM

## 2021-08-11 DIAGNOSIS — M79.661 PAIN OF RIGHT LOWER LEG: ICD-10-CM

## 2021-08-11 DIAGNOSIS — R29.6 FALLS FREQUENTLY: ICD-10-CM

## 2021-08-11 DIAGNOSIS — M79.18 BUTTOCK PAIN: ICD-10-CM

## 2021-08-11 PROCEDURE — 99284 EMERGENCY DEPT VISIT MOD MDM: CPT | Performed by: PHYSICIAN ASSISTANT

## 2021-08-11 PROCEDURE — 99282 EMERGENCY DEPT VISIT SF MDM: CPT | Performed by: PHYSICIAN ASSISTANT

## 2021-08-11 NOTE — TELEPHONE ENCOUNTER
In hospital recently in [place], just discharged last night   -for lower left leg swollen and turning blue and purple  -no new prescriptions   -told to see hematologist and follow up with PCP  -gave fluids   -Labs and CT scan were good     Foot is turning a purple color  -there is a swollen resistance from knees down  -bruises on thighs and upper arms   -Some rashes everywhere    Pain is in legs   -rates 6/10  -worse with walking   -feel like a macro antonio horse    Took benadryl and used hydrocortisone     Patient states that Everything is getting much worse while she is on the phone with this RN.     It is difficult to get full answers out of patient, and am unable to see her notes from [place] ED.     Triaged to a disposition of Go to ED Now. Patient is agreeable. Encouraged patient that the ED may not be able to figure out exactly what is going on with her, that a PCP may be a better place, but that the ED can rule out life threatening situations and with her current symptoms that is where it is recommended that she go. Patient verbalizes understanding and states she will go to ED.     COVID 19 Nurse Triage Plan/Patient Instructions    Please be aware that novel coronavirus (COVID-19) may be circulating in the community. If you develop symptoms such as fever, cough, or SOB or if you have concerns about the presence of another infection including coronavirus (COVID-19), please contact your health care provider or visit [email or web address].     Disposition/Instructions    ED Visit recommended. Follow protocol based instructions.     Bring Your Own Device:  Please also bring your smart device(s) (smart phones, tablets, laptops) and their charging cables for your personal use and to communicate with your care team during your visit.    Thank you for taking steps to prevent the spread of this virus.  o Limit your contact with others.  o Wear a simple mask to cover your cough.  o Wash your hands well and  often.    Resources    [facility]: About COVID-19: [email or web address]    CDC: What to Do If You're Sick: www.cdc.gov/coronavirus/2019-ncov/about/steps-when-sick.html    CDC: Ending Home Isolation: www.cdc.gov/coronavirus/2019-ncov/hcp/disposition-in-home-patients.html     CDC: Caring for Someone: www.cdc.gov/coronavirus/2019-ncov/if-you-are-sick/care-for-someone.html     [facility]: Interim Guidance for Hospital Discharge to Home: www.health.[facility].mn.us/diseases/coronavirus/hcp/hospdischarge.pdf    [facility] clinical trials (COVID-19 research studies): clinicalaffairs.[facility]-clinical-trials     Below are the COVID-19 hotlines at the [place] Department of Health ([facility]). Interpreters are available.   o For health questions: Call 426-178-2371 or 1-522.504.3142 (7 a.m. to 7 p.m.)  o For questions about schools and childcare: Call 460-990-2929 or 1-704.997.3493 (7 a.m. to 7 p.m.)     Reason for Disposition    Entire foot is cool or blue in comparison to other side    Additional Information    Negative: Looks like a broken bone or dislocated joint (e.g., crooked or deformed)    Negative: Sounds like a life-threatening emergency to the triager    Negative: Followed a leg injury    Negative: Leg swelling is main symptom    Negative: Back pain radiating (shooting) into leg(s)    Negative: Knee pain is main symptom    Negative: Ankle pain is main symptom    Negative: Pregnant    Negative: Postpartum (from 0 to 6 weeks after delivery)    Negative: Chest pain    Negative: Difficulty breathing    Protocols used: LEG PAIN-A-AH    Ines Villalpando RN on 8/11/2021 at 2:29 AM
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
## 2021-08-12 ENCOUNTER — VIRTUAL VISIT (OUTPATIENT)
Dept: FAMILY MEDICINE | Facility: OTHER | Age: 31
End: 2021-08-12
Payer: MEDICARE

## 2021-08-12 ENCOUNTER — TRANSFERRED RECORDS (OUTPATIENT)
Dept: HEALTH INFORMATION MANAGEMENT | Facility: CLINIC | Age: 31
End: 2021-08-12

## 2021-08-12 DIAGNOSIS — I10 HYPERTENSION GOAL BP (BLOOD PRESSURE) < 130/80: ICD-10-CM

## 2021-08-12 DIAGNOSIS — R23.3 EASY BRUISING: ICD-10-CM

## 2021-08-12 DIAGNOSIS — R29.6 FALLS FREQUENTLY: Primary | ICD-10-CM

## 2021-08-12 DIAGNOSIS — R60.9 EDEMA, UNSPECIFIED TYPE: ICD-10-CM

## 2021-08-12 DIAGNOSIS — Z98.1 S/P LUMBAR FUSION: ICD-10-CM

## 2021-08-12 DIAGNOSIS — F31.63 BIPOLAR DISORDER, CURRENT EPISODE MIXED, SEVERE, WITHOUT PSYCHOTIC FEATURES (H): ICD-10-CM

## 2021-08-12 PROCEDURE — 99443 PR PHYSICIAN TELEPHONE EVALUATION 21-30 MIN: CPT | Mod: 95 | Performed by: PHYSICIAN ASSISTANT

## 2021-08-12 ASSESSMENT — PAIN SCALES - GENERAL: PAINLEVEL: MODERATE PAIN (4)

## 2021-08-12 NOTE — PATIENT INSTRUCTIONS
- Klonopin      Decrease to 1/2 tablet AM and then 1 tablet at night for 1 week      Then decrease to 1/2 tablet twice a day for one week      Then decrease to 1/2 tablet once a day for 1 week and then stop medication     - Keep a log of blood pressure and pulse     - Send Zach your log every week     - Recheck 2 weeks     - Continue to call to schedule MRI, psychiatrist, and hematology     - Stop Flexeril

## 2021-08-12 NOTE — ED TRIAGE NOTES
Pt states falling a lot and don't remember what happened but has been having bruising BLE, R upper leg, R flank and R side bottom.

## 2021-08-12 NOTE — ED PROVIDER NOTES
History     Chief Complaint   Patient presents with     Bleeding/Bruising     The history is provided by the patient.     Katy Islas is a 31 year old female who presents to the emergency department for evaluation of bruising to her bilateral lower extremities, flank area, arms, and bottom. The patient is legally deaf but denied using an interpretor. She reports the bruising starting one week ago and worsening today. She is unsure what happened to cause the bruising but reports falling often without remembering. There is swelling to her right knee - to the point where she is unable to bend the knee. She rates her pain 6/10. She has been taking tylenol - she last had this around 2030 and states that it is not helping. She cannot use ibuprofen due to her GI issues. She is on aspirin daily, no other blood thinners. She called the nurse triage line around 0230 this morning and was advised to come in but did not at that time. Patient has about 3 bloody stools a day but attributes this to her chron's disease. She has been ambulatory. There is an area to her left lower leg that she thinks is  Infected.  At the end of the HPI, the patient requested IM Dilaudid to be given for her pain.              Assessment from ED visit to [facility] on 08/10/2021 for easy bruising:    Katy Islas is a 31 Y female patient who presents to the emergency department with the above symptoms. Initial vital signs show tachycardia. She is overall well-appearing. She does have evidence of mild cellulitis on her left leg. I did look at her prior work-up for her bruising including her x-rays. These were negative. Given her right flank ecchymoses I did order a CT scan to evaluate for retroperitoneal bleeding. This is negative for such. She is aware of the renal cysts that I discussed with her. Tachycardia improved with fluids. Looking back on her records she seems to always be tachycardic. She refused acetaminophen and naproxen  for her pain. She was given Benadryl upon request. She has a primary care appointment on Thursday to discuss the easy bruising. I do not see evidence of profound thrombocytopenia or evidence of leukemia or other sinister pathology at this point. I believe she is safe for discharge home. She was given antibiotics for her cellulitis. Does have a history of MRSA so was given clindamycin. Patient discharged home in stable condition with appropriate return precautions given.     Assessment from ED visit to [facility] on 08/09/2021 for frequent falls:    Katy Islas is a 31 y.o. female with a history of hearing impairment, anxiety, bipolar disorder, chronic pain disorder, fibromyalgia, Crohn's disease, and seizure disorder who is here with multiple concerns. She presented requesting evaluation for edema, persistent pneumonia, pain all over her body, frequent falls, and bruising. I met with the patient. Medical records were reviewed and the patient was at [facility] earlier today for similar symptoms where at that time she was admitted generalized nausea, chest pain, headache, and there is questionable left lower lobe pneumonia vs atelectasis (stable compared to previous chest x-ray noted). The patient was admitted to treat for possible pneumonia, however she became agitated when she was not able to receive dilaudid, removed her IV, and left AMA.     I discussed the patient's concerns and I offered her imaging and blood work, but I did try to reassure the patient with the results of labs/imaging at [facility] earlier today. We discussed her concern for her pain all over her body and I told her that we would not be treating her pain with narcotic pain medication. She then declined any additional workup but requested a refill for her Robitussin AC. I did not see robitussin AC listed on her medication list and told her that I could prescribe Robitussin without the codeine. She became upset and stated no one  here was willing to help her. I offered to have a  assist with schedule and close follow up with her primary doctor, hem/onc due to bruising, and the concussion clinic and I will leave a message with case management to assist with this. She left without any additional workup, but appears stable and vital signs are within normal limits.     Assessment from ED visit to [facility] on 08/09/2021 for pneumonia:    Katy is a 31 Y hearing impaired woman who recently left the hospital AMA. She has an untreated pneumonia. She may be over medicated. She has been missing a lot of her Clonazepam. She is unable to ambulate. She has fallen and struck her head. See the HPI.    The patient had a CT of her head which showed no intracranial pathology. Chest x-ray shows left basilar pneumonia, which was visible on CT scan from a previous visit. IV is established. She is on a pulse oximeter. She is unsafe on her feet. Labs were reviewed. She received 1 g of Rocephin and 500 mg of azithromycin.    She has mild dehydration and lactic acidemia. Additional IV fluids were administered. She was tachycardic when she presented. Lactate of 1.9. EKG was unremarkable besides sinus tachycardia. Troponin was 0.      Allergies:  Allergies   Allergen Reactions     Iohexol Hives     Other reaction(s): Hives  IV contrast; patient states she tolerates contrast if she gets benadryl before  IV contrast; patient states she tolerates contrast if she gets benadryl before     Baclofen      hives     Bees Hives, Swelling and Difficulty breathing     Contrast Dye      Hives,   Updated 5/10/2016 CT Contrast.     Iodine Hives     Metoclopramide      Other reaction(s): Tremors  LW Reaction: shaking/sweating     Midazolam      Other reaction(s): Agitation     Monosodium Glutamate      Morphine Other (See Comments)     Difficulty with urination     Nsaids Other (See Comments)     GI BLEED x2     Other (Do Not Use) Other (See Comments)      Xanaflex- pt becomes disoriented and loses bladder control     Reglan [Metoclopramide Hcl] Other (See Comments)     shaking     Soma [Carisoprodol] Visual Disturbance     Sleep walking     Tizanidine      Topamax Other (See Comments)     Topamax [Topiramate] Nausea     Tingling  GI/Vomit     Tramadol      Severe Headache, Seizure Risk     Tylenol W/Codeine [Acetaminophen-Codeine] Nausea and Itching     Tylenol 3     Versed Other (See Comments)     Zolmitriptan      Makes face feel like its twitching     Droperidol Anxiety     Flu Virus Vaccine Rash      Arm swelling       Problem List:    Patient Active Problem List    Diagnosis Date Noted     Verbally abusive behavior 05/27/2021     Priority: Medium     Birth elliot 05/19/2021     Priority: Medium     Suicidal behavior 05/14/2021     Priority: Medium     Renal cyst 05/12/2021     Priority: Medium     Borderline personality disorder (H) 04/27/2021     Priority: Medium     Lymphedema 09/10/2020     Priority: Medium     Patellofemoral pain syndrome of both knees 06/11/2020     Priority: Medium     Primary osteoarthritis of both knees 06/11/2020     Priority: Medium     Abscess of anal and rectal regions 04/27/2020     Priority: Medium     Added automatically from request for surgery [id number]       Rectal pain 04/19/2020     Priority: Medium     Abdominal pain 04/18/2020     Priority: Medium     Morbid obesity (H) 03/10/2020     Priority: Medium     Abnormal uterine bleeding 02/12/2020     Priority: Medium     Added automatically from request for surgery [id number]       Anal fissure 02/04/2020     Priority: Medium     Low hematocrit 02/04/2020     Priority: Medium     Elevated d-dimer 02/04/2020     Priority: Medium     Hypertension goal BP (blood pressure) < 130/80 08/05/2019     Priority: Medium     Bulge of cervical disc without myelopathy 04/24/2019     Priority: Medium     Cervicalgia 04/17/2019     Priority: Medium     Class 1 obesity due to excess calories without  serious comorbidity with body mass index (BMI) of 32.0 to 32.9 in adult 04/03/2019     Priority: Medium     Hypophosphatemia 11/01/2018     Priority: Medium     Patellar maltracking, right 09/05/2018     Priority: Medium     Right anterior knee pain 09/05/2018     Priority: Medium     Melanocytic nevus, unspecified location 07/23/2018     Priority: Medium     Dysplastic skin lesion 07/23/2018     Priority: Medium     Iliotibial band syndrome affecting lower leg, right 12/21/2017     Priority: Medium     Chondromalacia of right patellofemoral joint 12/21/2017     Priority: Medium     Upper GI bleed - suspected 07/01/2017     Priority: Medium     Tobacco use disorder 07/01/2017     Priority: Medium     History of pseudoseizure 07/01/2017     Priority: Medium     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) 03/10/2017     Priority: Medium     Lumbar disc disease with radiculopathy 02/23/2017     Priority: Medium     S/P lumbar fusion 02/23/2017     Priority: Medium     Dr. YOVANI Millan, 2/23/17       Vitamin D deficiency 01/06/2017     Priority: Medium     Atypical mole 01/06/2017     Priority: Medium     Mild persistent asthma without complication 12/02/2016     Priority: Medium     Chronic bilateral low back pain with left-sided sciatica 12/02/2016     Priority: Medium     Bee sting allergy 09/16/2016     Priority: Medium     C. difficile colitis 08/26/2016     Priority: Medium     Gastroesophageal reflux disease without esophagitis 08/26/2016     Priority: Medium     Herpes simplex virus infection 11/12/2015     Priority: Medium     Adjustment disorder with mixed anxiety and depressed mood 10/14/2015     Priority: Medium     Bipolar disorder (H) 01/31/2013     Priority: Medium     Chronic pain 02/09/2012     Priority: Medium     Patient is followed by Aura Rosario PA-C for ongoing prescription of pain medication.  All refills should only be approved by this provider, or covering  partner.    Medication(s): Norco    Maximum quantity per month: 70  Clinic visit frequency required: Q 2 months     Controlled substance agreement:   Discussed and signed 4/25/17    Encounter-Level CSA - 01/12/2015:                 Controlled Substance Agreement - Scan on 1/26/2015  9:14 AM : Controlled Medication Agreement-01/12/15 (below)            Pain Clinic evaluation in the past: Yes       Date/Location:   [facility], fall 2016    DIRE Total Score(s):    4/25/2017   Total Score 17       Last [facility] website verification:  done on 4/25/17 by LVOE Maki   [email or web address]           JULIETH (generalized anxiety disorder) 09/22/2011     Priority: Medium     Mild major depression (H) 08/29/2011     Priority: Medium     Crohn's disease of colon without complication (H) 08/04/2009     Priority: Medium     Dysmenorrhea 05/11/2007     Priority: Medium     Benign neoplasm of skin of lower limb, including hip 03/16/2004     Priority: Medium     Migraine      Priority: Medium     Dr. Farrar - Neurology.  Now on Inderal.  Seems to be working.  Follow-up 9/08  Problem list name updated by automated process. Provider to review       Hearing loss      Priority: Medium     Congenital  Problem list name updated by automated process. Provider to review       Allergic rhinitis      Priority: Medium     Problem list name updated by automated process. Provider to review          Past Medical History:    Past Medical History:   Diagnosis Date     Abdominal pain 10/31- 11/4/2005     Allergic rhinitis, cause unspecified      Arthritis      C. 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 partial seizures,  without mention of intractable epilepsy      Migraine 07/21/12     Migraine, unspecified, without mention of intractable migraine without mention of status migrainosus      Mild intermittent asthma      Mycoplasma infection in conditions classified elsewhere and of unspecified site      Other chronic pain      Renal disease      Unspecified hearing loss        Past Surgical History:    Past Surgical History:   Procedure Laterality Date     AS REMOVAL OF COCCYX  10/18/2017     C FUSION OF SACROILIAC JOINT  10/26/2018     COLONOSCOPY  7/1/2009    [facility].     COLONOSCOPY N/A 7/17/2019    Procedure: Colonoscopy, With Polypectomy And Biopsy;  Surgeon: Edwin Conde MD;  Location: [facility] OR     COLONOSCOPY WITH CO2 INSUFFLATION N/A 7/17/2019    Procedure: COLONOSCOPY, WITH CO2 INSUFFLATION;  Surgeon: Edwin Conde MD;  Location: [facility] OR     COMBINED ESOPHAGOSCOPY, GASTROSCOPY, DUODENOSCOPY (EGD) WITH CO2 INSUFFLATION N/A 4/12/2019    Procedure: COMBINED ESOPHAGOSCOPY, GASTROSCOPY, DUODENOSCOPY (EGD) WITH CO2 INSUFFLATION;  Surgeon: Edwin Conde MD;  Location: [facility] OR     ESOPHAGOSCOPY, GASTROSCOPY, DUODENOSCOPY (EGD), COMBINED N/A 4/12/2019    Procedure: Combined Esophagoscopy, Gastroscopy, Duodenoscopy (Egd), Biopsy Single Or Multiple;  Surgeon: Edwin Conde MD;  Location: [facility] OR     FISTULOTOMY RECTUM N/A 4/30/2020    Procedure: EXAM UNDER ANESTHESIA, ANUS, parital fistulotomy;  Surgeon: Valentin Sanchez MD;  Location: [facility] OR     FUSION SPINE POSTERIOR MINIMALLY INVASIVE ONE LEVEL N/A 2/23/2017    Procedure: FUSION SPINE POSTERIOR MINIMALLY INVASIVE ONE LEVEL;  L4-5 Oblique Lateral Lumbar Interbody Fusion   Epidural steroid injection.   Transpedicular Bone marrow aspiration;  Surgeon: Jeniffer Eugene MD;  Location: [facility] OR     HC EEG AWAKE AND SLEEP      abnormal     HC MRI BRAIN W/O CONTRAST  12/00    normal     HC REMOVAL GALLBLADDER   8/5/2009    [facility] [place]      UGI ENDOSCOPY DIAG W BIOPSY  11/11/09    Normal esophagus     ORTHOPEDIC SURGERY  October 19,2011    diskectomy L4-L5     [facility]  [facility]/2003    Impression is that of normal appearing colonoscopy, without evidence of rectal bleeding.     [facility] COLONOSCOPY THRU STOMA, DIAGNOSTIC  10/00    normal     [facility]ST[facility]  Oct 2009    Dr Lópze- normal     [facility] UGI ENDOSCOPY, SIMPLE EXAM  7/00, 10/00    mild chronic esophagitis and duodenitis, neg H pylori     [facility]  01/20/2005    Esophagogastroduodenoscopy, colonoscopy with biopsies.  [facility] UGI ENDOSCOPY, SIMPLE EXAM  7/1/2009    [facility], [facility].     [facility] UGI ENDOSCOPY, SIMPLE EXAM  11/11/2009    attempted upper GI, pt. could not tolerate procedure:[facility]       Family History:    Family History   Problem Relation Age of Onset     Gastrointestinal Disease Brother         severe Crohn's     Neurologic Disorder Brother         Seizures post head injury     Depression Brother      Substance Abuse Brother      Genitourinary Problems Father         kidney stones     Diabetes Father      Heart Disease Father         Open heart surgery     Breast Cancer Maternal Grandmother      Parkinsonism Maternal Grandmother      Cerebrovascular Disease Paternal Grandmother      Cancer Maternal Grandfather         Lung     Cardiovascular Paternal Grandfather         Heart Attack     Substance Abuse Mother         in recovery x1 year      Lung Cancer Paternal Uncle      Cancer Paternal Uncle      Lung Cancer Maternal Uncle      Colon Cancer Maternal Uncle        Social History:  Marital Status:  Single [1]  Social History     Tobacco Use     Smoking status: Current Every Day Smoker     Packs/day: 0.25     Years: 5.00     Pack years: 1.25     Types: Cigarettes     Smokeless tobacco: Never Used   Vaping  Use     Vaping Use: Some days     Substances: CBD     Devices: Disposable, Pre-filled or refillable cartridge   Substance Use Topics     Alcohol use: Not Currently     Comment: Not since last July 2018     Drug use: Not Currently     Types: Marijuana     Comment: Medical Marijuana currently-- More CBD        Medications:    albuterol (PROAIR HFA/PROVENTIL HFA/VENTOLIN HFA) 108 (90 Base) MCG/ACT inhaler  albuterol (PROVENTIL) (2.5 MG/3ML) 0.083% neb solution  alum & mag hydroxide-simethicone (MAALOX ADVANCED MAX ST) 400-400-40 MG/5ML SUSP suspension  aluminum chloride (DRYSOL) 20 % external solution  ARIPiprazole (ABILIFY) 30 MG tablet  azelastine (OPTIVAR) 0.05 % ophthalmic solution  buPROPion (WELLBUTRIN XL) 300 MG 24 hr tablet  busPIRone (BUSPAR) 15 MG tablet  [START ON 8/12/2021] clonazePAM (KLONOPIN) 1 MG tablet  cyclobenzaprine (FLEXERIL) 10 MG tablet  diclofenac (VOLTAREN) 1 % topical gel  dicyclomine (BENTYL) 20 MG tablet  diltiazem 2% in PLO gel  diphenhydrAMINE (BENADRYL) 50 MG capsule  DULoxetine (CYMBALTA) 30 MG capsule  EPINEPHrine (ANY BX GENERIC EQUIV) 0.3 MG/0.3ML injection 2-pack  furosemide (LASIX) 20 MG tablet  gabapentin (NEURONTIN) 300 MG capsule  lidocaine (XYLOCAINE) 2 % solution  medroxyPROGESTERone (DEPO-PROVERA) 150 MG/ML IM injection  mesalamine ER (PENTASA) 500 MG CR capsule  metoprolol succinate ER (TOPROL-XL) 50 MG 24 hr tablet  Multiple Vitamins-Minerals (EQ MULTIVITAMINS ADULT GUMMY) CHEW  naproxen (NAPROSYN) 500 MG tablet  NONFORMULARY  ondansetron (ZOFRAN-ODT) 4 MG ODT tab  pantoprazole (PROTONIX) 40 MG EC tablet  rizatriptan (MAXALT-MLT) 5 MG ODT  sucralfate (CARAFATE) 1 GM tablet  valACYclovir (VALTREX) 1000 mg tablet          Review of Systems   All other systems reviewed and are negative.      Physical Exam   BP: (!) 137/93  Pulse: (!) 124  Temp: 98.2  F (36.8  C)  Resp: 18  Weight: 88.5 kg (195 lb)  SpO2: 98 %      Physical Exam  Vitals and nursing note reviewed.    Constitutional:       General: She is not in acute distress.     Appearance: Normal appearance. She is not ill-appearing or diaphoretic.      Comments: Hard of hearing.  She speaks to communicate, but I write on paper for her to read and respond.  She declined a formal    HENT:      Head: Normocephalic and atraumatic.   Eyes:      Extraocular Movements: Extraocular movements intact.      Conjunctiva/sclera: Conjunctivae normal.      Pupils: Pupils are equal, round, and reactive to light.   Cardiovascular:      Rate and Rhythm: Regular rhythm.   Pulmonary:      Effort: Pulmonary effort is normal. No respiratory distress.      Breath sounds: No wheezing or rhonchi.   Abdominal:      General: Abdomen is flat. There is no distension.      Tenderness: There is no abdominal tenderness. There is no guarding or rebound.   Musculoskeletal:      Comments: Small contusion on the right forearm.  Normal range of motion of the right elbow, right wrist, and right hand.  No deformity.  Right anterior knee and right anterior lower leg with multiple contusions.  No induration, fluctuance, or erythema.  She is tender to palpation over these areas.  She has normal range of motion of the right hip, right knee, right ankle, and right foot.  Some pain with range of motion of the knee.   Bilateral lower extremities with distal pulses 2+.  Sensation intact to light touch throughout.  No calf tenderness.  No palpable cord.  Negative Homans' sign.  Isolated contusion on the left anterior lower leg.  No significant tenderness to palpation throughout the left lower extremity.  Sacral/coccyx area tender to palpation.  No defect.  No significant bruising noted.   Skin:     General: Skin is warm and dry.      Coloration: Skin is not pale.   Neurological:      General: No focal deficit present.      Mental Status: She is alert and oriented to person, place, and time. Mental status is at baseline.         ED Course         Procedures      No results found. However, due to the size of the patient record, not all encounters were searched. Please check Results Review for a complete set of results.    Medications - No data to display    Assessments & Plan (with Medical Decision Making)        Bruising  Pain of right lower leg  Buttock pain  Falls frequently       Katy is a 31 year old female with history of chronic pain, drug-seeking behavior, Crohn's disease, hearing impairment who presents to the ER for evaluation of bruising to her bilateral lower extremities, bilateral arms, flank onset one week ago and worsening today.  She states that she falls frequently.  She does not remember how this occurs.  She denies being assaulted.  Denies any recent fever or chills.  No dyspnea.  Please see HPI for details.  At the end of the HPI, the patient requested an IM dose of Dilaudid if at all possible.  I discussed that I was not comfortable giving her IM Dilaudid without reviewing her chart in detail given my distant memory of her having drug-seeking behavior.  She did not provide any history of being seen for these concerns recently.    Upon extensive chart review, the patient has been seen at multiple facilities - most recent are noted in the HPI above.  She has been seen at [facility] multiple times in the last 1 week as well as [facility] for the same complaints.  Has had full laboratory work-up as well as full x-ray evaluation for all of the painful extremity areas as well as his CT of the abdomen/pelvis to ensure that she did not have a retroperitoneal bleed.  With a couple of evaluations, the patient has left AMA when she has been declined IM or p.o. narcotic therapy.  She has been referred to hematology to look into why she is having more frequent bruising.  I confronted the patient with my concerns about these recent work-ups and the fact that she did not disclose this information to me initially.  She stated that she did  not feel it was important.  Discussed that she has had extensive radiographic work-up, and that I am not sure if I could provide any new information with repeating these plain x-rays.  She is not specific about having a recent repeat fall or trauma.  She denies being assaulted once again.  The only test that has not been done would be a lower extremity ultrasound to ensure no DVT.  She does not have any calf pain.  No palpable cord.  Negative Homans' sign.  She has frequent bruising and likely does not have an underlying hypercoagulable state.  I still offered this for her and reported that would be happy to call ultrasound and to do this.  She declined.  She really did not want to wait to have this done.  Discussed the risks of DVT, and she was comfortable with this.    On exam blood pressure 137/93, temperature 98.2, pulse 124, respirations 18, oxygen saturation 98% on room air. She does not appear distressed.  Did not appear to be in any acute pain.  There is no deformities.  Slow with range of motion the knee but she does have full extension and flexion capability.  Bruising noted most notably on the right lower extremity.  Some bruising on the left lower extremity and right forearm.  See exam above for details.  Ultimately, the patient declined further work-up with ultrasound here today.  She once again requested IM Dilaudid therapy prior to leaving.  Discussed that I am not comfortable giving her narcotic medication for bruising.  She agrees that she does have a follow-up appointment with her PCP tomorrow, and she will keep that appointment.  They will ensure that she does get Hematology evaluation.  The patient was comfortable with this.  Indications for return reviewed.  She was in agreement and was suitable for discharge.  She has close outpatient follow-up scheduled for tomorrow.     I have reviewed the nursing notes.    I have reviewed the findings, diagnosis, plan and need for follow up with the  patient.       New Prescriptions    No medications on file       Final diagnoses:   Bruising   Pain of right lower leg   Buttock pain   Falls frequently       This document serves as a record of services personally performed by Tomas De La Paz PA*. It was created on their behalf by Christa Barton, a trained medical scribe. The creation of this record is based on the provider's personal observations and the statements of the patient. This document has been checked and approved by the attending provider.    Note: Chart documentation done in part with Dragon Voice Recognition software. Although reviewed after completion, some word and grammatical errors may remain.    8/11/2021   [facility] EMERGENCY DEPT     Tomas De La Paz PA-C  08/11/21 [id number]

## 2021-08-13 ENCOUNTER — MYC MEDICAL ADVICE (OUTPATIENT)
Dept: FAMILY MEDICINE | Facility: OTHER | Age: 31
End: 2021-08-13

## 2021-08-15 ENCOUNTER — MYC MEDICAL ADVICE (OUTPATIENT)
Dept: FAMILY MEDICINE | Facility: OTHER | Age: 31
End: 2021-08-15

## 2021-08-15 DIAGNOSIS — K21.9 GASTROESOPHAGEAL REFLUX DISEASE WITHOUT ESOPHAGITIS: ICD-10-CM

## 2021-08-16 ENCOUNTER — MYC MEDICAL ADVICE (OUTPATIENT)
Dept: FAMILY MEDICINE | Facility: OTHER | Age: 31
End: 2021-08-16

## 2021-08-16 DIAGNOSIS — K21.9 GASTROESOPHAGEAL REFLUX DISEASE WITHOUT ESOPHAGITIS: ICD-10-CM

## 2021-08-16 RX ORDER — LIDOCAINE HYDROCHLORIDE 20 MG/ML
15 SOLUTION OROPHARYNGEAL EVERY 4 HOURS PRN
Qty: 100 ML | Refills: 3 | Status: SHIPPED | OUTPATIENT
Start: 2021-08-16 | End: 2022-01-26

## 2021-08-16 RX ORDER — LIDOCAINE HYDROCHLORIDE 20 MG/ML
15 SOLUTION OROPHARYNGEAL EVERY 4 HOURS PRN
Qty: 100 ML | Refills: 3 | Status: CANCELLED | OUTPATIENT
Start: 2021-08-16

## 2021-08-16 NOTE — TELEPHONE ENCOUNTER
Pending Prescriptions:                       Disp   Refills    lidocaine (XYLOCAINE) 2 % solution         100 mL 3        Sig: Swish and swallow 15 mLs in mouth every 4 hours as           needed for other (GERD) ; Max 8 doses/24 hour           period. Mix with 15 mLs Maalox or Mylanta.    Routing refill request to provider for review/approval because:  Drug not on the [facility] refill protocol

## 2021-08-16 NOTE — TELEPHONE ENCOUNTER
This nurse called to discuss further with the patient.     1. CDI/MRI - This nurse faxed over the referral.   2. Hematology- They reached out to schedule, but there are no openings until October. Patient does not want to wait that long. She would like the team to look at other options. Sent numbers to patient through [employer].   3. [facility]- They called and LM and will call back to schedule. They need approval to see a different doctor. She will call them back today.   4. BP- Has not yet. She does not have the money to buy one.     Patient describes having hard and bumpy areas on her leg where it is black and blue. She mentions the area is sensitive. She mentions she wants Zach to be able to feel them. This nurse advised that may or may not be possible this week as Zach only has virtual visits.   Zach, please review pictures that were sent today of leg/foot.       Jose Finney, EDYTAN, RN, PHN  [place]  August 16, 2021

## 2021-08-16 NOTE — TELEPHONE ENCOUNTER
Patient sent another mychart on 08/16/21 regarding same topic. Will close encounter.     EDYTA العليN, RN, PHN  [place]  August 16, 2021

## 2021-08-16 NOTE — TELEPHONE ENCOUNTER
Will likely need to wait to October, as this is pretty typical for that specialty.     Her legs have been evaluated in ED many times. The only recommendation that wasn't done was doing an ultrasound. If she wants me to order that I will.     Zach Rosario PA-C  [facility]

## 2021-08-16 NOTE — TELEPHONE ENCOUNTER
I only have virtual this week available. Please check in with her regarding scheduling her MRI with CDI, hematology, and [facility] [place].     Also should find out if she got a BP cuff and has been checking BP and her pulses    Zach Rosario PA-C  [facility]

## 2021-08-17 ENCOUNTER — NURSE TRIAGE (OUTPATIENT)
Dept: FAMILY MEDICINE | Facility: OTHER | Age: 31
End: 2021-08-17

## 2021-08-17 NOTE — TELEPHONE ENCOUNTER
Not sure what I am to review. There is no information on her symptoms except what is negative.     OK to be seen elsewhere, I have no openings this week     Zach Tomlinson-LOVE Moss  [facility]

## 2021-08-17 NOTE — TELEPHONE ENCOUNTER
RN Triage    Patient Contact    Attempt # 1    Was call answered?  No.  Left message on voicemail with information to call me back.    Currently patient has two visits scheduled. One on 8/18 in [place] at 3 pm and the other in [place] with PCP on 8/23 at 2 pm.     See message below and relay message when call is returned.    Catalina Jimenes RN  [place]

## 2021-08-17 NOTE — TELEPHONE ENCOUNTER
Zach, please review and advise the information below. Patient is also scheduled for [place]. FYI to Zach.     EDYTA العليN, RN, PHN  [place]  August 17, 2021

## 2021-08-18 ENCOUNTER — TRANSFERRED RECORDS (OUTPATIENT)
Dept: HEALTH INFORMATION MANAGEMENT | Facility: CLINIC | Age: 31
End: 2021-08-18

## 2021-08-18 ENCOUNTER — TELEPHONE (OUTPATIENT)
Dept: HEMATOLOGY | Facility: CLINIC | Age: 31
End: 2021-08-18

## 2021-08-18 LAB
CREATININE (EXTERNAL): 0.89 MG/DL (ref 0.57–1.11)
GFR ESTIMATED (EXTERNAL): >60 ML/MIN/1.73M(2)
GLUCOSE (EXTERNAL): 98 MG/DL (ref 70–105)
POTASSIUM (EXTERNAL): 4.2 MMOL/L (ref 3.5–5.1)

## 2021-08-18 NOTE — TELEPHONE ENCOUNTER
Patient calling back to see if this nurse discussed further with Zach.   I informed her that Zach has not responded yet, but that does not mean she won't respond. I informed her that I will call her back today or tomorrow after discussing with Zach.     NEW CONCERN:   Patient states that she has an abscess that is located on her rectum. She noticed this 2 days ago. She mentions that the abscess is about ready to pop, states the area is red and spreading. The area is warm, but also could be related to the location. She has been sitting on a pillow. The size of the abscess is about a quarter.   She also mentions that her stomach has been cramping since last Friday intermittently.      PLAN:   Patient's appointment was canceled today. Patient is concerned with infection. Encouraged going to UC. However, the patient feels like she should go to the ER. She is planning on going to [facility].     Jose Finney, EDYTAN, RN, PHN  [place]  August 18, 2021

## 2021-08-18 NOTE — TELEPHONE ENCOUNTER
Patient is calling informing this nurse she feels lousy. She voices she has a visit today with another provider at 3pm.     LEGS:   Patient states that spot on her legs have become hard and painful in areas. The color varies from purple, blue, to pink in areas. Last week the patient described an area on her lower left leg as a big bruise that was black/blue/purple. Over the week the patient stated that the bruise disappeared. Than yesterday the bruise reappeared and it was purple and pink. The areas that are brusingis itchy.     Patient stated she needs more labs. This nurse encouraged her to schedule with Hematology even if the appointment is in October. The patient agreed to schedule.     HEAD:   Patient describes a headache for the past 1 week. She rates the intensity 5/10 and her eye is painful.      PLAN:   Keep appointment scheduled at 3pm.   Inform Zach of plan.       Jose Finney, EDYTAN, RN, PHN  [place]  August 18, 2021

## 2021-08-19 ENCOUNTER — MYC MEDICAL ADVICE (OUTPATIENT)
Dept: FAMILY MEDICINE | Facility: OTHER | Age: 31
End: 2021-08-19

## 2021-08-20 NOTE — TELEPHONE ENCOUNTER
Patient went to the ED for this.  MyChart sent today to team.    Closing this encounter.  EDYTA SnowdenN, RN

## 2021-08-23 ENCOUNTER — OFFICE VISIT (OUTPATIENT)
Dept: FAMILY MEDICINE | Facility: OTHER | Age: 31
End: 2021-08-23
Payer: MEDICARE

## 2021-08-23 VITALS
OXYGEN SATURATION: 98 % | DIASTOLIC BLOOD PRESSURE: 82 MMHG | BODY MASS INDEX: 33.94 KG/M2 | RESPIRATION RATE: 16 BRPM | TEMPERATURE: 97.8 F | WEIGHT: 185.6 LBS | SYSTOLIC BLOOD PRESSURE: 118 MMHG | HEART RATE: 104 BPM

## 2021-08-23 DIAGNOSIS — I10 HYPERTENSION GOAL BP (BLOOD PRESSURE) < 130/80: ICD-10-CM

## 2021-08-23 DIAGNOSIS — I89.0 LYMPHEDEMA: ICD-10-CM

## 2021-08-23 DIAGNOSIS — F31.64 BIPOLAR DISORDER, CURRENT EPISODE MIXED, SEVERE, WITH PSYCHOTIC FEATURES (H): Primary | ICD-10-CM

## 2021-08-23 DIAGNOSIS — Z30.42 ENCOUNTER FOR SURVEILLANCE OF INJECTABLE CONTRACEPTIVE: ICD-10-CM

## 2021-08-23 DIAGNOSIS — T14.8XXA BRUISING: ICD-10-CM

## 2021-08-23 DIAGNOSIS — F60.3 BORDERLINE PERSONALITY DISORDER (H): ICD-10-CM

## 2021-08-23 DIAGNOSIS — R46.89 VERBALLY ABUSIVE BEHAVIOR: ICD-10-CM

## 2021-08-23 LAB
BASOPHILS # BLD AUTO: 0 10E3/UL (ref 0–0.2)
BASOPHILS NFR BLD AUTO: 0 %
EOSINOPHIL # BLD AUTO: 0 10E3/UL (ref 0–0.7)
EOSINOPHIL NFR BLD AUTO: 1 %
ERYTHROCYTE [DISTWIDTH] IN BLOOD BY AUTOMATED COUNT: 12 % (ref 10–15)
HCG UR QL: NEGATIVE
HCT VFR BLD AUTO: 40.6 % (ref 35–47)
HGB BLD-MCNC: 13.9 G/DL (ref 11.7–15.7)
LYMPHOCYTES # BLD AUTO: 1.3 10E3/UL (ref 0.8–5.3)
LYMPHOCYTES NFR BLD AUTO: 23 %
MCH RBC QN AUTO: 33.8 PG (ref 26.5–33)
MCHC RBC AUTO-ENTMCNC: 34.2 G/DL (ref 31.5–36.5)
MCV RBC AUTO: 99 FL (ref 78–100)
MONOCYTES # BLD AUTO: 0.6 10E3/UL (ref 0–1.3)
MONOCYTES NFR BLD AUTO: 11 %
NEUTROPHILS # BLD AUTO: 3.6 10E3/UL (ref 1.6–8.3)
NEUTROPHILS NFR BLD AUTO: 65 %
PLATELET # BLD AUTO: 352 10E3/UL (ref 150–450)
RBC # BLD AUTO: 4.11 10E6/UL (ref 3.8–5.2)
WBC # BLD AUTO: 5.6 10E3/UL (ref 4–11)

## 2021-08-23 PROCEDURE — 96127 BRIEF EMOTIONAL/BEHAV ASSMT: CPT | Performed by: PHYSICIAN ASSISTANT

## 2021-08-23 PROCEDURE — 36415 COLL VENOUS BLD VENIPUNCTURE: CPT | Performed by: PHYSICIAN ASSISTANT

## 2021-08-23 PROCEDURE — 96372 THER/PROPH/DIAG INJ SC/IM: CPT | Performed by: PHYSICIAN ASSISTANT

## 2021-08-23 PROCEDURE — T1013 SIGN LANG/ORAL INTERPRETER: HCPCS | Mod: U3

## 2021-08-23 PROCEDURE — 81025 URINE PREGNANCY TEST: CPT | Performed by: PHYSICIAN ASSISTANT

## 2021-08-23 PROCEDURE — 99215 OFFICE O/P EST HI 40 MIN: CPT | Mod: 25 | Performed by: PHYSICIAN ASSISTANT

## 2021-08-23 PROCEDURE — 85025 COMPLETE CBC W/AUTO DIFF WBC: CPT | Performed by: PHYSICIAN ASSISTANT

## 2021-08-23 RX ADMIN — MEDROXYPROGESTERONE ACETATE 150 MG: 150 INJECTION, SUSPENSION INTRAMUSCULAR at 15:23

## 2021-08-23 ASSESSMENT — ANXIETY QUESTIONNAIRES
GAD7 TOTAL SCORE: 5
4. TROUBLE RELAXING: SEVERAL DAYS
8. IF YOU CHECKED OFF ANY PROBLEMS, HOW DIFFICULT HAVE THESE MADE IT FOR YOU TO DO YOUR WORK, TAKE CARE OF THINGS AT HOME, OR GET ALONG WITH OTHER PEOPLE?: SOMEWHAT DIFFICULT
5. BEING SO RESTLESS THAT IT IS HARD TO SIT STILL: NOT AT ALL
6. BECOMING EASILY ANNOYED OR IRRITABLE: SEVERAL DAYS
7. FEELING AFRAID AS IF SOMETHING AWFUL MIGHT HAPPEN: NOT AT ALL
GAD7 TOTAL SCORE: 5
7. FEELING AFRAID AS IF SOMETHING AWFUL MIGHT HAPPEN: NOT AT ALL
GAD7 TOTAL SCORE: 5
3. WORRYING TOO MUCH ABOUT DIFFERENT THINGS: SEVERAL DAYS
2. NOT BEING ABLE TO STOP OR CONTROL WORRYING: SEVERAL DAYS
1. FEELING NERVOUS, ANXIOUS, OR ON EDGE: SEVERAL DAYS

## 2021-08-23 ASSESSMENT — PAIN SCALES - GENERAL: PAINLEVEL: MODERATE PAIN (4)
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
## 2021-08-23 NOTE — PROGRESS NOTES
Clinic Administered Medication Documentation    Administrations This Visit     medroxyPROGESTERone (DEPO-PROVERA) syringe 150 mg     Admin Date  08/23/2021 Action  Given Dose  150 mg Route  Intramuscular Site   Administered By  Claudine Jennings CMA    Ordering Provider: Aura Rosario PA-C    Patient Supplied?: No                  Depo Provera Documentation    URINE HCG: negative    Depo-Provera Standing Order inclusion/exclusion criteria reviewed.   Patient meets: inclusion criteria     BP: 118/82  LAST PAP/EXAM:   Lab Results   Component Value Date    PAP NIL 01/16/2020       Prior to injection, verified patient identity using patient's name and date of birth. Medication was administered. Please see MAR and medication order for additional information.     Was entire vial of medication used? Yes  Vial/Syringe: Single dose vial  Expiration Date:  12/22/2022    Patient instructed to remain in clinic for 15 minutes.  NEXT INJECTION DUE: 11/8/21 - 11/22/21    Claudine Jennings CMA

## 2021-08-24 ENCOUNTER — APPOINTMENT (OUTPATIENT)
Dept: CT IMAGING | Facility: CLINIC | Age: 31
DRG: 552 | End: 2021-08-24
Attending: EMERGENCY MEDICINE
Payer: MEDICARE

## 2021-08-24 ENCOUNTER — HOSPITAL ENCOUNTER (INPATIENT)
Facility: CLINIC | Age: 31
LOS: 5 days | Discharge: HOME | DRG: 552 | End: 2021-08-29
Attending: EMERGENCY MEDICINE | Admitting: INTERNAL MEDICINE
Payer: MEDICARE

## 2021-08-24 DIAGNOSIS — M50.30 BULGE OF CERVICAL DISC WITHOUT MYELOPATHY: Primary | ICD-10-CM

## 2021-08-24 DIAGNOSIS — Z11.52 ENCOUNTER FOR SCREENING LABORATORY TESTING FOR SEVERE ACUTE RESPIRATORY SYNDROME CORONAVIRUS 2 (SARS-COV-2): ICD-10-CM

## 2021-08-24 DIAGNOSIS — S50.12XA CONTUSION OF LEFT FOREARM, INITIAL ENCOUNTER: ICD-10-CM

## 2021-08-24 DIAGNOSIS — S80.11XA CONTUSION OF MULTIPLE SITES OF RIGHT LOWER EXTREMITY, INITIAL ENCOUNTER: ICD-10-CM

## 2021-08-24 DIAGNOSIS — S37.011A HEMATOMA OF RIGHT KIDNEY, INITIAL ENCOUNTER: ICD-10-CM

## 2021-08-24 DIAGNOSIS — S80.12XA CONTUSION OF LEFT LOWER EXTREMITY, INITIAL ENCOUNTER: ICD-10-CM

## 2021-08-24 DIAGNOSIS — T07.XXXA MULTIPLE BRUISES: ICD-10-CM

## 2021-08-24 DIAGNOSIS — R20.2 PARESTHESIAS: ICD-10-CM

## 2021-08-24 DIAGNOSIS — S50.11XA CONTUSION OF RIGHT FOREARM, INITIAL ENCOUNTER: ICD-10-CM

## 2021-08-24 DIAGNOSIS — F60.3 EXPLOSIVE PERSONALITY DISORDER (H): ICD-10-CM

## 2021-08-24 LAB
ALBUMIN SERPL-MCNC: 4.5 G/DL (ref 3.4–5)
ALBUMIN UR-MCNC: NEGATIVE MG/DL
ALP SERPL-CCNC: 120 U/L (ref 40–150)
ALT SERPL W P-5'-P-CCNC: 29 U/L (ref 0–50)
ANION GAP SERPL CALCULATED.3IONS-SCNC: 3 MMOL/L (ref 3–14)
APPEARANCE UR: CLEAR
AST SERPL W P-5'-P-CCNC: 18 U/L (ref 0–45)
BACTERIA #/AREA URNS HPF: ABNORMAL /HPF
BASOPHILS # BLD AUTO: 0 10E3/UL (ref 0–0.2)
BASOPHILS NFR BLD AUTO: 1 %
BILIRUB SERPL-MCNC: 0.4 MG/DL (ref 0.2–1.3)
BILIRUB UR QL STRIP: NEGATIVE
BUN SERPL-MCNC: 16 MG/DL (ref 7–30)
CALCIUM SERPL-MCNC: 9.4 MG/DL (ref 8.5–10.1)
CHLORIDE BLD-SCNC: 104 MMOL/L (ref 94–109)
CO2 SERPL-SCNC: 29 MMOL/L (ref 20–32)
COLOR UR AUTO: YELLOW
CREAT SERPL-MCNC: 1.05 MG/DL (ref 0.52–1.04)
EOSINOPHIL # BLD AUTO: 0.1 10E3/UL (ref 0–0.7)
EOSINOPHIL NFR BLD AUTO: 1 %
ERYTHROCYTE [DISTWIDTH] IN BLOOD BY AUTOMATED COUNT: 11.9 % (ref 10–15)
GFR SERPL CREATININE-BSD FRML MDRD: 70 ML/MIN/1.73M2
GLUCOSE BLD-MCNC: 80 MG/DL (ref 70–99)
GLUCOSE UR STRIP-MCNC: NEGATIVE MG/DL
HCG SERPL QL: NEGATIVE
HCT VFR BLD AUTO: 42.3 % (ref 35–47)
HGB BLD-MCNC: 14.4 G/DL (ref 11.7–15.7)
HGB UR QL STRIP: NEGATIVE
HOLD SPECIMEN: NORMAL
IMM GRANULOCYTES # BLD: 0 10E3/UL
IMM GRANULOCYTES NFR BLD: 1 %
INR PPP: 1.01 (ref 0.86–1.14)
KETONES UR STRIP-MCNC: NEGATIVE MG/DL
LACTATE SERPL-SCNC: 0.8 MMOL/L (ref 0.7–2)
LEUKOCYTE ESTERASE UR QL STRIP: NEGATIVE
LYMPHOCYTES # BLD AUTO: 2 10E3/UL (ref 0.8–5.3)
LYMPHOCYTES NFR BLD AUTO: 28 %
MAGNESIUM SERPL-MCNC: 1.9 MG/DL (ref 1.6–2.3)
MCH RBC QN AUTO: 32.7 PG (ref 26.5–33)
MCHC RBC AUTO-ENTMCNC: 34 G/DL (ref 31.5–36.5)
MCV RBC AUTO: 96 FL (ref 78–100)
MONOCYTES # BLD AUTO: 0.9 10E3/UL (ref 0–1.3)
MONOCYTES NFR BLD AUTO: 12 %
MUCOUS THREADS #/AREA URNS LPF: PRESENT /LPF
NEUTROPHILS # BLD AUTO: 4.2 10E3/UL (ref 1.6–8.3)
NEUTROPHILS NFR BLD AUTO: 57 %
NITRATE UR QL: NEGATIVE
NRBC # BLD AUTO: 0 10E3/UL
NRBC BLD AUTO-RTO: 0 /100
PH UR STRIP: 6 [PH] (ref 5–7)
PHOSPHATE SERPL-MCNC: 2.7 MG/DL (ref 2.5–4.5)
PLATELET # BLD AUTO: 440 10E3/UL (ref 150–450)
POTASSIUM BLD-SCNC: 4 MMOL/L (ref 3.4–5.3)
PROT SERPL-MCNC: 8.4 G/DL (ref 6.8–8.8)
RBC # BLD AUTO: 4.41 10E6/UL (ref 3.8–5.2)
RBC URINE: 1 /HPF
SODIUM SERPL-SCNC: 136 MMOL/L (ref 133–144)
SP GR UR STRIP: 1.02 (ref 1–1.03)
SQUAMOUS EPITHELIAL: 2 /HPF
TROPONIN I SERPL-MCNC: <0.015 UG/L (ref 0–0.04)
UROBILINOGEN UR STRIP-MCNC: NORMAL MG/DL
WBC # BLD AUTO: 7.2 10E3/UL (ref 4–11)
WBC URINE: 1 /HPF

## 2021-08-24 PROCEDURE — 83605 ASSAY OF LACTIC ACID: CPT | Performed by: EMERGENCY MEDICINE

## 2021-08-24 PROCEDURE — 71250 CT THORAX DX C-: CPT | Mod: MG

## 2021-08-24 PROCEDURE — 96361 HYDRATE IV INFUSION ADD-ON: CPT | Performed by: EMERGENCY MEDICINE

## 2021-08-24 PROCEDURE — 96374 THER/PROPH/DIAG INJ IV PUSH: CPT | Performed by: EMERGENCY MEDICINE

## 2021-08-24 PROCEDURE — 99285 EMERGENCY DEPT VISIT HI MDM: CPT | Mod: 25 | Performed by: EMERGENCY MEDICINE

## 2021-08-24 PROCEDURE — 250N000013 HC RX MED GY IP 250 OP 250 PS 637: Performed by: EMERGENCY MEDICINE

## 2021-08-24 PROCEDURE — 93010 ELECTROCARDIOGRAM REPORT: CPT | Performed by: EMERGENCY MEDICINE

## 2021-08-24 PROCEDURE — 85610 PROTHROMBIN TIME: CPT | Performed by: EMERGENCY MEDICINE

## 2021-08-24 PROCEDURE — 80053 COMPREHEN METABOLIC PANEL: CPT | Performed by: EMERGENCY MEDICINE

## 2021-08-24 PROCEDURE — 70450 CT HEAD/BRAIN W/O DYE: CPT | Mod: MG

## 2021-08-24 PROCEDURE — 83735 ASSAY OF MAGNESIUM: CPT | Performed by: EMERGENCY MEDICINE

## 2021-08-24 PROCEDURE — 120N000002 HC R&B MED SURG/OB UMMC

## 2021-08-24 PROCEDURE — 87040 BLOOD CULTURE FOR BACTERIA: CPT | Performed by: EMERGENCY MEDICINE

## 2021-08-24 PROCEDURE — 250N000011 HC RX IP 250 OP 636: Performed by: EMERGENCY MEDICINE

## 2021-08-24 PROCEDURE — 85025 COMPLETE CBC W/AUTO DIFF WBC: CPT | Performed by: EMERGENCY MEDICINE

## 2021-08-24 PROCEDURE — 84703 CHORIONIC GONADOTROPIN ASSAY: CPT | Performed by: EMERGENCY MEDICINE

## 2021-08-24 PROCEDURE — 84484 ASSAY OF TROPONIN QUANT: CPT | Performed by: EMERGENCY MEDICINE

## 2021-08-24 PROCEDURE — 81001 URINALYSIS AUTO W/SCOPE: CPT | Performed by: EMERGENCY MEDICINE

## 2021-08-24 PROCEDURE — 93005 ELECTROCARDIOGRAM TRACING: CPT | Performed by: EMERGENCY MEDICINE

## 2021-08-24 PROCEDURE — 36415 COLL VENOUS BLD VENIPUNCTURE: CPT | Performed by: EMERGENCY MEDICINE

## 2021-08-24 PROCEDURE — C9803 HOPD COVID-19 SPEC COLLECT: HCPCS | Performed by: EMERGENCY MEDICINE

## 2021-08-24 PROCEDURE — 258N000003 HC RX IP 258 OP 636: Performed by: EMERGENCY MEDICINE

## 2021-08-24 PROCEDURE — 84100 ASSAY OF PHOSPHORUS: CPT | Performed by: EMERGENCY MEDICINE

## 2021-08-24 RX ORDER — HYDROMORPHONE HYDROCHLORIDE 1 MG/ML
0.5 INJECTION, SOLUTION INTRAMUSCULAR; INTRAVENOUS; SUBCUTANEOUS
Status: COMPLETED | OUTPATIENT
Start: 2021-08-24 | End: 2021-08-25

## 2021-08-24 RX ORDER — OXYCODONE HYDROCHLORIDE 5 MG/1
5 TABLET ORAL ONCE
Status: COMPLETED | OUTPATIENT
Start: 2021-08-24 | End: 2021-08-24

## 2021-08-24 RX ADMIN — SODIUM CHLORIDE 1000 ML: 9 INJECTION, SOLUTION INTRAVENOUS at 19:04

## 2021-08-24 RX ADMIN — OXYCODONE HYDROCHLORIDE 5 MG: 5 TABLET ORAL at 23:53

## 2021-08-24 RX ADMIN — HYDROMORPHONE HYDROCHLORIDE 0.5 MG: 1 INJECTION, SOLUTION INTRAMUSCULAR; INTRAVENOUS; SUBCUTANEOUS at 19:02

## 2021-08-24 ASSESSMENT — ANXIETY QUESTIONNAIRES: GAD7 TOTAL SCORE: 5

## 2021-08-24 ASSESSMENT — PATIENT HEALTH QUESTIONNAIRE - PHQ9: SUM OF ALL RESPONSES TO PHQ QUESTIONS 1-9: 7

## 2021-08-24 NOTE — ED PROVIDER NOTES
ED Provider Note  [facility]      History     Chief Complaint   Patient presents with     Numbness     Patient reports numbness radiating from back to bilateral lower extremeties. Patient reports a lump at L4 and L5. Patient reports numbness being different that in the past.     The history is provided by the patient and medical records. A  was used (American Sign Language ).     Katy Islas is a 31 year old female with a past medical history significant for seizure disorder, Crohn's disease, fibromyalgia, IBS, hypertension, renal disease. She presents to the ED for an evaluation of numbness. Patient reports numbness radiating from her back to her lower extremities. Additionally, she states she has a shooting pain radiating from her right side to her legs and she is in a lot of pain. She notes having a lump over the midline of her low back that is new and painful. She reports feeling a twitch and shakiness recently. Numbness symptoms on and off but worsening for last 3 weeks. No leg weakness. No new bowel or bladder incontinence. Has a hx of previous back surgery and has a lumbar spinal stimulator in place. Pt has not been using the stimulator much as she didn't find it helpful for discomfort and causes a twitching feeling. She thinks it is currently turned off an uncharged.   She also notes extensive bruising to the arms, legs and trunk. Has had falls in the past but denies them recently. She is not anticoagulated and does not have a known hx of a bleeding or clotting disorder. Her primary care has referred her to a hematologist but is not scheduled until mid October. Most recently developed a bruise to the right flank without known trauma to the area.   No fever.   Was treated with antibiotics recently for possible pneumonia       Past Medical History  Past Medical History:   Diagnosis Date     Abdominal pain 10/31- 11/4/2005    [facility] admit for Crohn's      Allergic rhinitis, cause unspecified     Allergic rhinitis     Arthritis      C. difficile diarrhea     Past, no current diarrhea.     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, without mention of intractable epilepsy     pseudoseizures diagnosed after extensive neurologic eval     Migraine 07/21/12    D/C 07/22/12-[facility]     Migraine, unspecified, without mention of intractable migraine without mention of status migrainosus     Migraine     Mild intermittent asthma     mild intermittent     Mycoplasma infection in conditions classified elsewhere and of unspecified site      Other chronic pain     Back pain for 6 years     Renal disease     Kidney stones     Unspecified hearing loss     congenital hearing loss     Past Surgical History:   Procedure Laterality Date     AS REMOVAL OF COCCYX  10/18/2017     C FUSION OF SACROILIAC JOINT  10/26/2018     COLONOSCOPY  7/1/2009    [facility] [place].     COLONOSCOPY N/A 7/17/2019    Procedure: Colonoscopy, With Polypectomy And Biopsy;  Surgeon: Edwin Conde MD;  Location: [facility] OR     COLONOSCOPY WITH CO2 INSUFFLATION N/A 7/17/2019    Procedure: COLONOSCOPY, WITH CO2 INSUFFLATION;  Surgeon: Edwin Conde MD;  Location: [facility] OR     COMBINED ESOPHAGOSCOPY, GASTROSCOPY, DUODENOSCOPY (EGD) WITH CO2 INSUFFLATION N/A 4/12/2019    Procedure: COMBINED ESOPHAGOSCOPY, GASTROSCOPY, DUODENOSCOPY (EGD) WITH CO2 INSUFFLATION;  Surgeon: Edwin Conde MD;   Location: [facility] OR     ESOPHAGOSCOPY, GASTROSCOPY, DUODENOSCOPY (EGD), COMBINED N/A 4/12/2019    Procedure: Combined Esophagoscopy, Gastroscopy, Duodenoscopy (Egd), Biopsy Single Or Multiple;  Surgeon: Edwin Conde MD;  Location: [facility] OR     FISTULOTOMY RECTUM N/A 4/30/2020    Procedure: EXAM UNDER ANESTHESIA, ANUS, parital fistulotomy;  Surgeon: Valentin Sanchez MD;  Location: [facility]     FUSION SPINE POSTERIOR MINIMALLY INVASIVE ONE LEVEL N/A 2/23/2017    Procedure: FUSION SPINE POSTERIOR MINIMALLY INVASIVE ONE LEVEL;  L4-5 Oblique Lateral Lumbar Interbody Fusion   Epidural steroid injection.   Transpedicular Bone marrow aspiration;  Surgeon: Jeniffer Eugene MD;  Location: [facility] OR     HC EEG AWAKE AND SLEEP      abnormal     HC MRI BRAIN W/O CONTRAST  12/00    normal     HC REMOVAL GALLBLADDER  8/5/2009    [facility].     HC UGI ENDOSCOPY DIAG W BIOPSY  11/11/09    Normal esophagus     ORTHOPEDIC SURGERY  October 19,2011    diskectomy L4-L5     ZZ COLONOSCOPY THRU STOMA WITH BIOPSY  10/29/2003    Impression is that of normal appearing colonoscopy, without evidence of rectal bleeding.     ZZ COLONOSCOPY THRU STOMA, DIAGNOSTIC  10/00    normal     ZZ COLONOSCOPY THRU STOMA, DIAGNOSTIC  Oct 2009    Dr López- normal     [facility] UGI ENDOSCOPY, SIMPLE EXAM  7/00, 10/00    mild chronic esophagitis and duodenitis, neg H pylori     [facility] UGI ENDOSCOPY, SIMPLE EXAM  01/20/2005    Esophagogastroduodenoscopy, colonoscopy with biopsies.  [facility] UGI ENDOSCOPY, SIMPLE EXAM  7/1/2009    [facility].     [facility] UGI ENDOSCOPY, SIMPLE EXAM  11/11/2009    attempted upper GI, pt. could not tolerate procedure:[facility]     albuterol (PROAIR HFA/PROVENTIL HFA/VENTOLIN HFA) 108 (90 Base) MCG/ACT inhaler  albuterol (PROVENTIL) (2.5 MG/3ML) 0.083% neb solution  alum & mag hydroxide-simethicone (MAALOX ADVANCED MAX ST) 400-400-40 MG/5ML SUSP  suspension  aluminum chloride (DRYSOL) 20 % external solution  ARIPiprazole (ABILIFY) 30 MG tablet  azelastine (OPTIVAR) 0.05 % ophthalmic solution  buPROPion (WELLBUTRIN XL) 300 MG 24 hr tablet  diclofenac (VOLTAREN) 1 % topical gel  dicyclomine (BENTYL) 20 MG tablet  diltiazem 2% in PLO gel  DULoxetine (CYMBALTA) 30 MG capsule  EPINEPHrine (ANY BX GENERIC EQUIV) 0.3 MG/0.3ML injection 2-pack  furosemide (LASIX) 20 MG tablet  gabapentin (NEURONTIN) 300 MG capsule  lidocaine (XYLOCAINE) 2 % solution  medroxyPROGESTERone (DEPO-PROVERA) 150 MG/ML IM injection  mesalamine ER (PENTASA) 500 MG CR capsule  metoprolol succinate ER (TOPROL-XL) 50 MG 24 hr tablet  Multiple Vitamins-Minerals (EQ MULTIVITAMINS ADULT GUMMY) CHEW  naproxen (NAPROSYN) 500 MG tablet  NONFORMULARY  ondansetron (ZOFRAN-ODT) 4 MG ODT tab  pantoprazole (PROTONIX) 40 MG EC tablet  rizatriptan (MAXALT-MLT) 5 MG ODT  sucralfate (CARAFATE) 1 GM tablet      Allergies   Allergen Reactions     Iohexol Hives     Other reaction(s): Hives  IV contrast; patient states she tolerates contrast if she gets benadryl before  IV contrast; patient states she tolerates contrast if she gets benadryl before     Other Environmental Allergy Rash     Plastic tape     Baclofen      hives     Bees Hives, Swelling and Difficulty breathing     Contrast Dye      Hives,   Updated 5/10/2016 CT Contrast.     Iodine Hives     Metoclopramide      Other reaction(s): Tremors  LW Reaction: shaking/sweating     Midazolam      Other reaction(s): Agitation     Monosodium Glutamate      Morphine Other (See Comments)     Difficulty with urination     Nsaids Other (See Comments)     GI BLEED x2     Other (Do Not Use) Other (See Comments)     Xanaflex- pt becomes disoriented and loses bladder control     Reglan [Metoclopramide Hcl] Other (See Comments)     shaking     Soma [Carisoprodol] Visual Disturbance     Sleep walking     Tizanidine      Topamax Other (See Comments)     Topamax  [Topiramate] Nausea     Tingling  GI/Vomit     Tramadol      Severe Headache, Seizure Risk     Tylenol W/Codeine [Acetaminophen-Codeine] Nausea and Itching     Tylenol 3     Versed Other (See Comments)     Zolmitriptan      Makes face feel like its twitching     Droperidol Anxiety     Flu Virus Vaccine Rash      Arm swelling     Family History  Family History   Problem Relation Age of Onset     Gastrointestinal Disease Brother         severe Crohn's     Neurologic Disorder Brother         Seizures post head injury     Depression Brother      Substance Abuse Brother      Genitourinary Problems Father         kidney stones     Diabetes Father      Heart Disease Father         Open heart surgery     Breast Cancer Maternal Grandmother      Parkinsonism Maternal Grandmother      Cerebrovascular Disease Paternal Grandmother      Cancer Maternal Grandfather         Lung     Cardiovascular Paternal Grandfather         Heart Attack     Substance Abuse Mother         in recovery x1 year      Lung Cancer Paternal Uncle      Cancer Paternal Uncle      Lung Cancer Maternal Uncle      Colon Cancer Maternal Uncle      Social History   Social History     Tobacco Use     Smoking status: Current Every Day Smoker     Packs/day: 0.25     Years: 5.00     Pack years: 1.25     Types: Cigarettes     Smokeless tobacco: Never Used   Vaping Use     Vaping Use: Some days     Substances: CBD     Devices: Disposable, Pre-filled or refillable cartridge   Substance Use Topics     Alcohol use: Not Currently     Comment: Not since last July 2018     Drug use: Not Currently     Types: Marijuana     Comment: Medical Marijuana currently-- More CBD      Past medical history, past surgical history, medications, allergies, family history, and social history were reviewed with the patient. No additional pertinent items.       Review of Systems  A complete review of systems was performed with pertinent positives and negatives noted in the HPI, and all other  systems negative.    Physical Exam   BP: (!) 136/90  Pulse: (!) 145  Temp: 99.4  F (37.4  C)  Resp: 16  SpO2: 93 %     Physical Exam  Gen:A&Ox3, no acute distress, uncomfortable.   HEENT:PERRL, no facial tenderness or wounds, head atraumatic, oropharynx clear, mucous membranes moist, TMs clear bilaterally  Neck:no bony tenderness or step offs, no JVD, trachea midline  Back: right CVA tenderness, mid lumbar incision with nodularity and tenderness to the overlying soft tissue without visible hematoma, no erythema  CV:RRR without murmurs  PULM:Clear to auscultation bilaterally  Abd:soft, tender in the right flank  UE:+ radial pulses and normal hand perfusion, scattered ecchymoses  LE: + ecchymoses to legs R>L, worst on the shins and left thigh  Neuro:CN II-XII intact, strength 5/5 throughout, paresthesias and decreased sensation to the legs bilaterally  Skin: multiple ecchymoses      ED Course     6:31 PM  The patient was seen and examined by Theresa Alvarez MD in Room ED06.   Procedures            EKG Interpretation:      Interpreted by Theresa Alvarez MD  Time reviewed: 7:38pm  Symptoms at time of EKG: pain and numbness  Rhythm: sinus tachycardia  Rate: 117  Axis: normal  Ectopy: none  Conduction: normal  ST Segments/ T Waves: No ST-T wave changes  Q Waves: none  Comparison to prior: No old EKG available    Clinical Impression: sinus tachycardia       Results for orders placed or performed during the hospital encounter of 08/24/21   Head CT w/o contrast     Status: None    Narrative    CT SCAN OF THE HEAD WITHOUT CONTRAST   8/24/2021 8:52 PM     HISTORY: Dizziness, non-specific.    TECHNIQUE:  Axial images of the head and coronal reformations without  IV contrast material. Radiation dose for this scan was reduced using  automated exposure control, adjustment of the mA and/or kV according  to patient size, or iterative reconstruction technique.    COMPARISON: CT head 11/2/2011.    FINDINGS: There is no evidence of  intracranial hemorrhage, mass, acute  infarct or anomaly. The ventricles are normal in size, shape and  configuration. The brain parenchyma and subarachnoid spaces are  normal.     Small polypoid soft tissue density lesion in the right nasal cavity,  positioned along the right aspect of the nasal septum which may  possibly be attached to the anterior inferior aspect of the right  middle turbinate (series 6 image 7) measuring approximately 9 mm. This  appears to have been present to some degree on previous exams and is  nonspecific. This could potentially represent a nasal polyp but is  incompletely characterized. Otherwise, the visualized paranasal  sinuses and nasal cavity are unremarkable. The visualized aspects of  the mastoid and middle ear cavities are clear. The bony calvarium and  bones of the skull base appear intact.     On the  images, partially visualized postsurgical changes in the  lower lumbar region as well as spinal cord stimulator, incompletely  evaluated.      Impression    IMPRESSION:  1. No CT findings of acute intracranial process.  2. Small nonspecific polypoid soft tissue lesion in the right nasal  cavity, as described. Recommend correlation with direct inspection.    MACHO BLANCHARD MD         SYSTEM ID:  [id number]   CT Chest Abdomen Pelvis w/o Contrast     Status: None    Narrative    CT CHEST ABDOMEN PELVIS W/O CONTRAST 8/24/2021 8:52 PM    CLINICAL HISTORY: Chest pain or back pain, aortic dissection  suspected, history of hypertension and Crohn's disease    TECHNIQUE: CT scan of the chest, abdomen, and pelvis was performed  without IV contrast. Multiplanar reformats were obtained. Dose  reduction techniques were used. Patient allergic to contrast.  CONTRAST: None.    COMPARISON: CT 5/5/2021    FINDINGS: Limited exam due to patient motion.    VASCULAR FINDINGS: Precontrast imaging shows no evidence of intramural  hematoma. Normal caliber thoracic and abdominal aorta. This exam  is  nondiagnostic for aortic dissection.    LUNGS AND PLEURA: The central airways are clear. Mosaic lung  attenuation. Mild left basilar and lingular consolidation. No pleural  effusion.    MEDIASTINUM/AXILLAE: No thoracic adenopathy. Upper normal heart size.  No pericardial effusion.    HEPATOBILIARY: Postcholecystectomy. No biliary ductal dilatation.  Grossly unremarkable unenhanced liver.    PANCREAS: Normal.    SPLEEN: Normal.    ADRENAL GLANDS: Normal.    KIDNEYS/BLADDER: Small hyperdense lower right renal cortical region  consistent with hemorrhagic cysts. Punctate nonobstructing left renal  calcifications. No hydronephrosis, hydroureter or obstructing ureteral  stone. Unremarkable urinary bladder.     BOWEL: Normal caliber small bowel. The appendix is not clearly  visualized. Normal caliber colon. No free air or free fluid.    LYMPH NODES: Normal.    VASCULATURE: Please see above.    PELVIC ORGANS: Normal.    OTHER: Thoracic spinal stimulator device.    MUSCULOSKELETAL: Left SI joint fusion. L4-5 fusion.      Impression    IMPRESSION:  1.  No acute findings. No evidence of a thoracic or abdominal aortic  aneurysm or intramural hematoma. Note that contrast was not  administered due to patient allergy. This precludes the evaluation for  an aortic dissection or pulmonary artery embolism.  2.  Punctate nonobstructing left renal calculi.    THOM SCHERER MD         SYSTEM ID:  [id number]   Extra Red Top Tube     Status: None   Result Value Ref Range    Hold Specimen JIC    Extra Green Top (Lithium Heparin) Tube     Status: None   Result Value Ref Range    Hold Specimen JIC    Extra Purple Top Tube     Status: None   Result Value Ref Range    Hold Specimen JIC    CBC with platelets differential     Status: None    Narrative    The following orders were created for panel order CBC with platelets differential.  Procedure                               Abnormality         Status                     ---------                                -----------         ------                     CBC with platelets and d...[[id number]]                      Final result                 Please view results for these tests on the individual orders.   Comprehensive metabolic panel     Status: Abnormal   Result Value Ref Range    Sodium 136 133 - 144 mmol/L    Potassium 4.0 3.4 - 5.3 mmol/L    Chloride 104 94 - 109 mmol/L    Carbon Dioxide (CO2) 29 20 - 32 mmol/L    Anion Gap 3 3 - 14 mmol/L    Urea Nitrogen 16 7 - 30 mg/dL    Creatinine 1.05 (H) 0.52 - 1.04 mg/dL    Calcium 9.4 8.5 - 10.1 mg/dL    Glucose 80 70 - 99 mg/dL    Alkaline Phosphatase 120 40 - 150 U/L    AST 18 0 - 45 U/L    ALT 29 0 - 50 U/L    Protein Total 8.4 6.8 - 8.8 g/dL    Albumin 4.5 3.4 - 5.0 g/dL    Bilirubin Total 0.4 0.2 - 1.3 mg/dL    GFR Estimate 70 >60 mL/min/1.73m2   INR     Status: Normal   Result Value Ref Range    INR 1.01 0.86 - 1.14   Troponin I     Status: Normal   Result Value Ref Range    Troponin I <0.015 0.000 - 0.045 ug/L   Magnesium     Status: Normal   Result Value Ref Range    Magnesium 1.9 1.6 - 2.3 mg/dL   Phosphorus     Status: Normal   Result Value Ref Range    Phosphorus 2.7 2.5 - 4.5 mg/dL   UA with Microscopic reflex to Culture     Status: Abnormal    Specimen: Urine, Clean Catch   Result Value Ref Range    Color Urine Yellow Colorless, Straw, Light Yellow, Yellow    Appearance Urine Clear Clear    Glucose Urine Negative Negative mg/dL    Bilirubin Urine Negative Negative    Ketones Urine Negative Negative mg/dL    Specific Gravity Urine 1.021 1.003 - 1.035    Blood Urine Negative Negative    pH Urine 6.0 5.0 - 7.0    Protein Albumin Urine Negative Negative mg/dL    Urobilinogen Urine Normal Normal, 2.0 mg/dL    Nitrite Urine Negative Negative    Leukocyte Esterase Urine Negative Negative    Bacteria Urine Few (A) None Seen /HPF    Mucus Urine Present (A) None Seen /LPF    RBC Urine 1 <=2 /HPF    WBC Urine 1 <=5 /HPF    Squamous Epithelials Urine  2 (H) <=1 /HPF    Narrative    Urine Culture not indicated   HCG qualitative pregnancy (blood)     Status: Normal   Result Value Ref Range    hCG Serum Qualitative Negative Negative   Lactic acid whole blood     Status: Normal   Result Value Ref Range    Lactic Acid 0.8 0.7 - 2.0 mmol/L   CBC with platelets and differential     Status: None   Result Value Ref Range    WBC Count 7.2 4.0 - 11.0 10e3/uL    RBC Count 4.41 3.80 - 5.20 10e6/uL    Hemoglobin 14.4 11.7 - 15.7 g/dL    Hematocrit 42.3 35.0 - 47.0 %    MCV 96 78 - 100 fL    MCH 32.7 26.5 - 33.0 pg    MCHC 34.0 31.5 - 36.5 g/dL    RDW 11.9 10.0 - 15.0 %    Platelet Count 440 150 - 450 10e3/uL    % Neutrophils 57 %    % Lymphocytes 28 %    % Monocytes 12 %    % Eosinophils 1 %    % Basophils 1 %    % Immature Granulocytes 1 %    NRBCs per 100 WBC 0 <1 /100    Absolute Neutrophils 4.2 1.6 - 8.3 10e3/uL    Absolute Lymphocytes 2.0 0.8 - 5.3 10e3/uL    Absolute Monocytes 0.9 0.0 - 1.3 10e3/uL    Absolute Eosinophils 0.1 0.0 - 0.7 10e3/uL    Absolute Basophils 0.0 0.0 - 0.2 10e3/uL    Absolute Immature Granulocytes 0.0 <=0.0 10e3/uL    Absolute NRBCs 0.0 10e3/uL   Asymptomatic COVID-19 Virus (Coronavirus) by PCR Nasopharyngeal     Status: Normal    Specimen: Nasopharyngeal; Swab   Result Value Ref Range    SARS CoV2 PCR Negative Negative    Narrative    Testing was performed using the oralia  SARS-CoV-2 & Influenza A/B Assay on the oralia  [place]  System.  This test should be ordered for the detection of SARS-COV-2 in individuals who meet SARS-CoV-2 clinical and/or epidemiological criteria. Test performance is unknown in asymptomatic patients.  This test is for in vitro diagnostic use under the FDA EUA for laboratories certified under CLIA to perform moderate and/or high complexity testing. This test has not been FDA cleared or approved.  A negative test does not rule out the presence of PCR inhibitors in the specimen or target RNA in concentration below the limit of  detection for the assay. The possibility of a false negative should be considered if the patient's recent exposure or clinical presentation suggests COVID-19.  [facility] are certified under the Clinical Laboratory Improvement Amendments of 1988 (CLIA-88) as qualified to perform moderate and/or high complexity laboratory testing.   [place] Draw     Status: None    Narrative    The following orders were created for panel order [place] Draw.  Procedure                               Abnormality         Status                     ---------                               -----------         ------                     Extra Red Top Tube[[id number]]                               Final result               Extra Green Top (Lithium...[[id number]]                      Final result               Extra Purple Top Tube[[id number]]                            Final result                 Please view results for these tests on the individual orders.     Medications   HYDROmorphone (PF) (DILAUDID) injection 0.5 mg (0.5 mg Intravenous Given 8/25/21 0204)   HYDROmorphone (PF) (DILAUDID) injection 0.5 mg (has no administration in time range)   0.9% sodium chloride BOLUS (0 mLs Intravenous Stopped 8/24/21 2020)   oxyCODONE (ROXICODONE) tablet 5 mg (5 mg Oral Given 8/24/21 2353)   diphenhydrAMINE (BENADRYL) capsule 25 mg (25 mg Oral Given 8/25/21 0038)   ondansetron (ZOFRAN-ODT) ODT tab 4 mg (4 mg Oral Given 8/25/21 0204)        Assessments & Plan (with Medical Decision Making)   32 yo F with a complex past medical history.   Presenting primarily for back pain with associated paresthesias in the legs. This seems to be subacute over the last few weeks.   Also having extensive bruising without clear cause.   DDx considered included intraabdominal or spinal hematoma, AAA, musculoskeletal back pain, ITP, coagulation abnormality. Low suspicion for acute CVA.   IV accessed and lab testing done.   CBC unremarkable, including a  normal plt count. Hgb14.4 and stable.   CMP unremarkable.   HCG negative.   Lactic acid 0.8  Troponin negative.   INR 1.0  UA unremarkable.  COVD negative.   CT head without intracranial abnormalities.   CT CAP with signs of possible hemorrhagic cysts of the right kidney. Prior CT A/P in may raise question of possible renal cell CA in this area, will need follow up. Unclear if this is the cause of her back and flank pain. No noted retroperitoneal hematoma, AAA, acute bowel abnormalities or significant skin/soft tissue findings.   Considered further work up for leg numbness with spinal MRI but unable to get this during the night due to her implanted spinal stimulator.   Treated for pain and nausea and zofran and dilaudid.   Discussed with IM triage hospitalist and admitted for further work up. Awaiting bed on [place] due to consideration for possible hematology consult.     I have reviewed the nursing notes. I have reviewed the findings, diagnosis, plan and need for follow up with the patient.    New Prescriptions    No medications on file       Final diagnoses:   Hematoma of right kidney, initial encounter   Multiple bruises   Paresthesias       --  I, Ivelisse Hartman, am serving as a trained medical scribe to document services personally performed by Theresa Alvarez MD, based on the provider's statements to me.     ITheresa MD, was physically present and have reviewed and verified the accuracy of this note documented by Ivelisse Hartman.    Theresa Alvarez MD [facility]  8/24/2021     Theresa Alvarez MD  08/25/21 0304

## 2021-08-24 NOTE — ED TRIAGE NOTES
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 The numbness is new and neck feels tight. Patient was driving to the hospital to see her psychiatrist when numbness started today. Patient denies any recent trauma. Patient thinks this is her lungs causing this pain. Patient was recently diagnosed with pneumonia. Patient believes she needs an MRI. Patient completed antibiotics.

## 2021-08-25 ENCOUNTER — OFFICE VISIT (OUTPATIENT)
Dept: INTERPRETER SERVICES | Facility: CLINIC | Age: 31
DRG: 552 | End: 2021-08-25
Payer: MEDICARE

## 2021-08-25 DIAGNOSIS — F43.23 ADJUSTMENT DISORDER WITH MIXED ANXIETY AND DEPRESSED MOOD: ICD-10-CM

## 2021-08-25 DIAGNOSIS — F41.9 ANXIETY: ICD-10-CM

## 2021-08-25 LAB
ATRIAL RATE - MUSE: 117 BPM
DIASTOLIC BLOOD PRESSURE - MUSE: NORMAL MMHG
INTERPRETATION ECG - MUSE: NORMAL
P AXIS - MUSE: 28 DEGREES
PR INTERVAL - MUSE: 118 MS
QRS DURATION - MUSE: 76 MS
QT - MUSE: 320 MS
QTC - MUSE: 446 MS
R AXIS - MUSE: 58 DEGREES
SARS-COV-2 RNA RESP QL NAA+PROBE: NEGATIVE
SYSTOLIC BLOOD PRESSURE - MUSE: NORMAL MMHG
T AXIS - MUSE: 44 DEGREES
VENTRICULAR RATE- MUSE: 117 BPM

## 2021-08-25 PROCEDURE — 120N000002 HC R&B MED SURG/OB UMMC

## 2021-08-25 PROCEDURE — 87635 SARS-COV-2 COVID-19 AMP PRB: CPT | Performed by: EMERGENCY MEDICINE

## 2021-08-25 PROCEDURE — 250N000011 HC RX IP 250 OP 636: Performed by: EMERGENCY MEDICINE

## 2021-08-25 PROCEDURE — T1013 SIGN LANG/ORAL INTERPRETER: HCPCS | Mod: U3

## 2021-08-25 PROCEDURE — 96376 TX/PRO/DX INJ SAME DRUG ADON: CPT | Performed by: EMERGENCY MEDICINE

## 2021-08-25 PROCEDURE — 250N000013 HC RX MED GY IP 250 OP 250 PS 637: Performed by: EMERGENCY MEDICINE

## 2021-08-25 PROCEDURE — 96375 TX/PRO/DX INJ NEW DRUG ADDON: CPT | Performed by: EMERGENCY MEDICINE

## 2021-08-25 PROCEDURE — 90791 PSYCH DIAGNOSTIC EVALUATION: CPT

## 2021-08-25 RX ORDER — DIPHENHYDRAMINE HCL 25 MG
25 CAPSULE ORAL ONCE
Status: COMPLETED | OUTPATIENT
Start: 2021-08-25 | End: 2021-08-25

## 2021-08-25 RX ORDER — ONDANSETRON 2 MG/ML
4 INJECTION INTRAMUSCULAR; INTRAVENOUS EVERY 30 MIN PRN
Status: DISCONTINUED | OUTPATIENT
Start: 2021-08-25 | End: 2021-08-26

## 2021-08-25 RX ORDER — GABAPENTIN 300 MG/1
300 CAPSULE ORAL 3 TIMES DAILY
Status: DISCONTINUED | OUTPATIENT
Start: 2021-08-25 | End: 2021-08-29 | Stop reason: HOSPADM

## 2021-08-25 RX ORDER — HYDROMORPHONE HYDROCHLORIDE 1 MG/ML
0.5 INJECTION, SOLUTION INTRAMUSCULAR; INTRAVENOUS; SUBCUTANEOUS
Status: COMPLETED | OUTPATIENT
Start: 2021-08-25 | End: 2021-08-25

## 2021-08-25 RX ORDER — ALBUTEROL SULFATE 90 UG/1
2 AEROSOL, METERED RESPIRATORY (INHALATION) EVERY 6 HOURS PRN
Status: DISCONTINUED | OUTPATIENT
Start: 2021-08-25 | End: 2021-08-29 | Stop reason: HOSPADM

## 2021-08-25 RX ORDER — HYDROMORPHONE HCL IN WATER/PF 6 MG/30 ML
0.2 PATIENT CONTROLLED ANALGESIA SYRINGE INTRAVENOUS
Status: COMPLETED | OUTPATIENT
Start: 2021-08-25 | End: 2021-08-25

## 2021-08-25 RX ORDER — DULOXETIN HYDROCHLORIDE 30 MG/1
60 CAPSULE, DELAYED RELEASE ORAL DAILY
Status: DISCONTINUED | OUTPATIENT
Start: 2021-08-25 | End: 2021-08-29 | Stop reason: HOSPADM

## 2021-08-25 RX ORDER — PANTOPRAZOLE SODIUM 40 MG/1
40 TABLET, DELAYED RELEASE ORAL 2 TIMES DAILY
Status: DISCONTINUED | OUTPATIENT
Start: 2021-08-25 | End: 2021-08-29 | Stop reason: HOSPADM

## 2021-08-25 RX ORDER — ONDANSETRON 4 MG/1
4 TABLET, ORALLY DISINTEGRATING ORAL ONCE
Status: COMPLETED | OUTPATIENT
Start: 2021-08-25 | End: 2021-08-25

## 2021-08-25 RX ORDER — BUPROPION HYDROCHLORIDE 150 MG/1
300 TABLET ORAL EVERY MORNING
Status: DISCONTINUED | OUTPATIENT
Start: 2021-08-25 | End: 2021-08-29 | Stop reason: HOSPADM

## 2021-08-25 RX ADMIN — HYDROMORPHONE HYDROCHLORIDE 0.2 MG: 0.2 INJECTION, SOLUTION INTRAMUSCULAR; INTRAVENOUS; SUBCUTANEOUS at 20:48

## 2021-08-25 RX ADMIN — HYDROMORPHONE HYDROCHLORIDE 0.2 MG: 0.2 INJECTION, SOLUTION INTRAMUSCULAR; INTRAVENOUS; SUBCUTANEOUS at 21:54

## 2021-08-25 RX ADMIN — PANTOPRAZOLE SODIUM 40 MG: 40 TABLET, DELAYED RELEASE ORAL at 19:57

## 2021-08-25 RX ADMIN — BUPROPION HYDROCHLORIDE 300 MG: 150 TABLET, FILM COATED, EXTENDED RELEASE ORAL at 09:51

## 2021-08-25 RX ADMIN — DULOXETINE HYDROCHLORIDE 60 MG: 30 CAPSULE, DELAYED RELEASE ORAL at 09:46

## 2021-08-25 RX ADMIN — DIPHENHYDRAMINE HYDROCHLORIDE 25 MG: 25 CAPSULE ORAL at 23:21

## 2021-08-25 RX ADMIN — HYDROMORPHONE HYDROCHLORIDE 0.5 MG: 1 INJECTION, SOLUTION INTRAMUSCULAR; INTRAVENOUS; SUBCUTANEOUS at 16:16

## 2021-08-25 RX ADMIN — HYDROMORPHONE HYDROCHLORIDE 0.2 MG: 0.2 INJECTION, SOLUTION INTRAMUSCULAR; INTRAVENOUS; SUBCUTANEOUS at 23:26

## 2021-08-25 RX ADMIN — DIPHENHYDRAMINE HYDROCHLORIDE 25 MG: 25 CAPSULE ORAL at 16:56

## 2021-08-25 RX ADMIN — HYDROMORPHONE HYDROCHLORIDE 0.5 MG: 1 INJECTION, SOLUTION INTRAMUSCULAR; INTRAVENOUS; SUBCUTANEOUS at 19:18

## 2021-08-25 RX ADMIN — GABAPENTIN 300 MG: 300 CAPSULE ORAL at 09:47

## 2021-08-25 RX ADMIN — ONDANSETRON 4 MG: 4 TABLET, ORALLY DISINTEGRATING ORAL at 02:04

## 2021-08-25 RX ADMIN — GABAPENTIN 300 MG: 300 CAPSULE ORAL at 19:18

## 2021-08-25 RX ADMIN — HYDROMORPHONE HYDROCHLORIDE 0.5 MG: 1 INJECTION, SOLUTION INTRAMUSCULAR; INTRAVENOUS; SUBCUTANEOUS at 12:15

## 2021-08-25 RX ADMIN — HYDROMORPHONE HYDROCHLORIDE 0.5 MG: 1 INJECTION, SOLUTION INTRAMUSCULAR; INTRAVENOUS; SUBCUTANEOUS at 02:04

## 2021-08-25 RX ADMIN — DIPHENHYDRAMINE HYDROCHLORIDE 25 MG: 25 CAPSULE ORAL at 00:38

## 2021-08-25 RX ADMIN — HYDROMORPHONE HYDROCHLORIDE 0.5 MG: 1 INJECTION, SOLUTION INTRAMUSCULAR; INTRAVENOUS; SUBCUTANEOUS at 09:06

## 2021-08-25 RX ADMIN — DIPHENHYDRAMINE HYDROCHLORIDE 25 MG: 25 CAPSULE ORAL at 16:16

## 2021-08-25 RX ADMIN — GABAPENTIN 300 MG: 300 CAPSULE ORAL at 14:32

## 2021-08-25 RX ADMIN — ALBUTEROL SULFATE 2 PUFF: 90 AEROSOL, METERED RESPIRATORY (INHALATION) at 09:47

## 2021-08-25 RX ADMIN — PANTOPRAZOLE SODIUM 40 MG: 40 TABLET, DELAYED RELEASE ORAL at 09:47

## 2021-08-25 RX ADMIN — ONDANSETRON 4 MG: 2 INJECTION INTRAMUSCULAR; INTRAVENOUS at 08:53

## 2021-08-25 NOTE — UTILIZATION REVIEW
"Admission Status; Secondary Review Determination     Under the authority of the Utilization Management Committee, the utilization review process indicated a secondary review on the above patient.  The review outcome is based on review of the medical records, discussions with staff, and applying clinical experience noted on the date of the review.          (x) Observation Status Appropriate - This patient does not meet hospital inpatient criteria and is placed in observation status. If this patient's primary payer is Medicare and was admitted as an inpatient, Condition Code 44 should be used and patient status changed to \"observation\".     RATIONALE FOR DETERMINATION   32 yo female with deafness, bipolar disorder, probable cluster B personality disorder, anxiety, depression, chronic pain, GERD, crohn's disease, IBS, chronic back pain, tobacco abuse, h/o pseudoseizure, hypertension, who presented with mental health concerns, paresthesias, bruising. Has presented to a [facility] facility with same complaints within the past month, was admitted and immediately left against medical advice. Also seen at [facility] for similar complaints with negative work-ups.     During this ED stay she has had imaging including head CT which was negative for acute changes and CT of chest, abdomen, pelvis that was negative for acute findings. All labs were normal including WBC, hemoglobin, platelet count, lactate, UA, INR. She was intended to have an outpatient hematology appointment in October but the patient does not want to wait. Admitting to hospital to expedite hematology consultation. She is getting intermittent opiates in the setting of chronic pain syndrome and multiple pain medications prior to admission. No fever or signs/symptoms of infection.     The severity of illness, intensity of service provided, expected LOS and risk for adverse outcome make the care appropriate for further observation; " however, doesn't meet criteria for hospital inpatient admission.     This document was produced using voice recognition software.      The information on this document is developed by the utilization review team in order for the business office to ensure compliance.  This only denotes the appropriateness of proper admission status and does not reflect the quality of care rendered.         The definitions of Inpatient Status and Observation Status used in making the determination above are those provided in the CMS Coverage Manual, Chapter 1 and Chapter 6, section 70.4.      Sincerely,  Liza Parry MD  Utilization Review  Physician Advisor  [facility].

## 2021-08-26 ENCOUNTER — OFFICE VISIT (OUTPATIENT)
Dept: INTERPRETER SERVICES | Facility: CLINIC | Age: 31
DRG: 552 | End: 2021-08-26
Payer: MEDICARE

## 2021-08-26 ENCOUNTER — OFFICE VISIT (OUTPATIENT)
Dept: INTERPRETER SERVICES | Facility: CLINIC | Age: 31
End: 2021-08-26

## 2021-08-26 PROCEDURE — 120N000002 HC R&B MED SURG/OB UMMC

## 2021-08-26 PROCEDURE — 250N000013 HC RX MED GY IP 250 OP 250 PS 637: Performed by: EMERGENCY MEDICINE

## 2021-08-26 PROCEDURE — 250N000011 HC RX IP 250 OP 636: Performed by: STUDENT IN AN ORGANIZED HEALTH CARE EDUCATION/TRAINING PROGRAM

## 2021-08-26 PROCEDURE — 250N000011 HC RX IP 250 OP 636: Performed by: EMERGENCY MEDICINE

## 2021-08-26 PROCEDURE — T1013 SIGN LANG/ORAL INTERPRETER: HCPCS | Mod: U3

## 2021-08-26 PROCEDURE — 99207 PR CDG-HISTORY COMPONENT: MEETS DETAILED - DOWN CODED LACK OF ROS: CPT | Performed by: INTERNAL MEDICINE

## 2021-08-26 PROCEDURE — 250N000013 HC RX MED GY IP 250 OP 250 PS 637: Performed by: INTERNAL MEDICINE

## 2021-08-26 PROCEDURE — 250N000013 HC RX MED GY IP 250 OP 250 PS 637: Performed by: STUDENT IN AN ORGANIZED HEALTH CARE EDUCATION/TRAINING PROGRAM

## 2021-08-26 PROCEDURE — 99221 1ST HOSP IP/OBS SF/LOW 40: CPT | Mod: AI | Performed by: INTERNAL MEDICINE

## 2021-08-26 RX ORDER — ONDANSETRON 2 MG/ML
4 INJECTION INTRAMUSCULAR; INTRAVENOUS ONCE
Status: COMPLETED | OUTPATIENT
Start: 2021-08-26 | End: 2021-08-26

## 2021-08-26 RX ORDER — DICYCLOMINE HCL 20 MG
20 TABLET ORAL EVERY 6 HOURS
Status: DISCONTINUED | OUTPATIENT
Start: 2021-08-26 | End: 2021-08-29 | Stop reason: HOSPADM

## 2021-08-26 RX ORDER — ARIPIPRAZOLE 30 MG/1
30 TABLET ORAL AT BEDTIME
Status: DISCONTINUED | OUTPATIENT
Start: 2021-08-26 | End: 2021-08-29 | Stop reason: HOSPADM

## 2021-08-26 RX ORDER — HYDROMORPHONE HYDROCHLORIDE 1 MG/ML
0.5 INJECTION, SOLUTION INTRAMUSCULAR; INTRAVENOUS; SUBCUTANEOUS
Status: COMPLETED | OUTPATIENT
Start: 2021-08-26 | End: 2021-08-26

## 2021-08-26 RX ORDER — OXYCODONE HYDROCHLORIDE 5 MG/1
5 TABLET ORAL EVERY 4 HOURS PRN
Status: DISCONTINUED | OUTPATIENT
Start: 2021-08-26 | End: 2021-08-29

## 2021-08-26 RX ORDER — NALOXONE HYDROCHLORIDE 0.4 MG/ML
0.4 INJECTION, SOLUTION INTRAMUSCULAR; INTRAVENOUS; SUBCUTANEOUS
Status: DISCONTINUED | OUTPATIENT
Start: 2021-08-26 | End: 2021-08-29 | Stop reason: HOSPADM

## 2021-08-26 RX ORDER — DIPHENHYDRAMINE HYDROCHLORIDE 50 MG/ML
25 INJECTION INTRAMUSCULAR; INTRAVENOUS ONCE
Status: COMPLETED | OUTPATIENT
Start: 2021-08-26 | End: 2021-08-26

## 2021-08-26 RX ORDER — HYDROMORPHONE HYDROCHLORIDE 1 MG/ML
0.5 INJECTION, SOLUTION INTRAMUSCULAR; INTRAVENOUS; SUBCUTANEOUS
Status: DISCONTINUED | OUTPATIENT
Start: 2021-08-26 | End: 2021-08-26

## 2021-08-26 RX ORDER — ACETAMINOPHEN 325 MG/1
975 TABLET ORAL 3 TIMES DAILY PRN
Status: DISCONTINUED | OUTPATIENT
Start: 2021-08-26 | End: 2021-08-29 | Stop reason: HOSPADM

## 2021-08-26 RX ORDER — ARIPIPRAZOLE 30 MG/1
30 TABLET ORAL AT BEDTIME
Qty: 30 TABLET | Refills: 1 | OUTPATIENT
Start: 2021-08-26

## 2021-08-26 RX ORDER — DIPHENHYDRAMINE HCL 25 MG
25 CAPSULE ORAL EVERY 4 HOURS PRN
Status: DISCONTINUED | OUTPATIENT
Start: 2021-08-26 | End: 2021-08-29 | Stop reason: CLARIF

## 2021-08-26 RX ORDER — POLYETHYLENE GLYCOL 3350 17 G/17G
17 POWDER, FOR SOLUTION ORAL ONCE
Status: COMPLETED | OUTPATIENT
Start: 2021-08-26 | End: 2021-08-26

## 2021-08-26 RX ORDER — NALOXONE HYDROCHLORIDE 0.4 MG/ML
0.2 INJECTION, SOLUTION INTRAMUSCULAR; INTRAVENOUS; SUBCUTANEOUS
Status: DISCONTINUED | OUTPATIENT
Start: 2021-08-26 | End: 2021-08-29 | Stop reason: HOSPADM

## 2021-08-26 RX ORDER — NALOXONE HYDROCHLORIDE 0.4 MG/ML
0.4 INJECTION, SOLUTION INTRAMUSCULAR; INTRAVENOUS; SUBCUTANEOUS
Status: DISCONTINUED | OUTPATIENT
Start: 2021-08-26 | End: 2021-08-26

## 2021-08-26 RX ORDER — HYDROXYZINE HYDROCHLORIDE 25 MG/1
25 TABLET, FILM COATED ORAL
Status: COMPLETED | OUTPATIENT
Start: 2021-08-26 | End: 2021-08-26

## 2021-08-26 RX ORDER — PROCHLORPERAZINE MALEATE 10 MG
10 TABLET ORAL EVERY 6 HOURS PRN
Status: DISCONTINUED | OUTPATIENT
Start: 2021-08-26 | End: 2021-08-29 | Stop reason: HOSPADM

## 2021-08-26 RX ORDER — HYDROMORPHONE HYDROCHLORIDE 1 MG/ML
0.5 INJECTION, SOLUTION INTRAMUSCULAR; INTRAVENOUS; SUBCUTANEOUS EVERY 4 HOURS PRN
Status: DISCONTINUED | OUTPATIENT
Start: 2021-08-26 | End: 2021-08-26

## 2021-08-26 RX ORDER — DIPHENHYDRAMINE HCL 25 MG
25 CAPSULE ORAL EVERY 4 HOURS PRN
Status: DISCONTINUED | OUTPATIENT
Start: 2021-08-26 | End: 2021-08-26

## 2021-08-26 RX ORDER — SUCRALFATE 1 G/1
1 TABLET ORAL 4 TIMES DAILY PRN
Status: DISCONTINUED | OUTPATIENT
Start: 2021-08-26 | End: 2021-08-29 | Stop reason: HOSPADM

## 2021-08-26 RX ORDER — LIDOCAINE 40 MG/G
CREAM TOPICAL
Status: DISCONTINUED | OUTPATIENT
Start: 2021-08-26 | End: 2021-08-29 | Stop reason: HOSPADM

## 2021-08-26 RX ORDER — PROCHLORPERAZINE 25 MG
25 SUPPOSITORY, RECTAL RECTAL EVERY 12 HOURS PRN
Status: DISCONTINUED | OUTPATIENT
Start: 2021-08-26 | End: 2021-08-29 | Stop reason: HOSPADM

## 2021-08-26 RX ORDER — HYDROMORPHONE HYDROCHLORIDE 1 MG/ML
0.5 INJECTION, SOLUTION INTRAMUSCULAR; INTRAVENOUS; SUBCUTANEOUS EVERY 4 HOURS PRN
Status: DISCONTINUED | OUTPATIENT
Start: 2021-08-26 | End: 2021-08-29

## 2021-08-26 RX ORDER — ONDANSETRON 4 MG/1
4 TABLET, ORALLY DISINTEGRATING ORAL EVERY 6 HOURS PRN
Status: DISCONTINUED | OUTPATIENT
Start: 2021-08-26 | End: 2021-08-29 | Stop reason: HOSPADM

## 2021-08-26 RX ORDER — NALOXONE HYDROCHLORIDE 0.4 MG/ML
0.2 INJECTION, SOLUTION INTRAMUSCULAR; INTRAVENOUS; SUBCUTANEOUS
Status: DISCONTINUED | OUTPATIENT
Start: 2021-08-26 | End: 2021-08-26

## 2021-08-26 RX ORDER — ONDANSETRON 2 MG/ML
4 INJECTION INTRAMUSCULAR; INTRAVENOUS EVERY 6 HOURS PRN
Status: DISCONTINUED | OUTPATIENT
Start: 2021-08-26 | End: 2021-08-29 | Stop reason: HOSPADM

## 2021-08-26 RX ADMIN — HYDROMORPHONE HYDROCHLORIDE 0.5 MG: 1 INJECTION, SOLUTION INTRAMUSCULAR; INTRAVENOUS; SUBCUTANEOUS at 15:33

## 2021-08-26 RX ADMIN — DULOXETINE HYDROCHLORIDE 60 MG: 30 CAPSULE, DELAYED RELEASE ORAL at 08:08

## 2021-08-26 RX ADMIN — HYDROMORPHONE HYDROCHLORIDE 0.5 MG: 1 INJECTION, SOLUTION INTRAMUSCULAR; INTRAVENOUS; SUBCUTANEOUS at 17:20

## 2021-08-26 RX ADMIN — HYDROMORPHONE HYDROCHLORIDE 0.5 MG: 1 INJECTION, SOLUTION INTRAMUSCULAR; INTRAVENOUS; SUBCUTANEOUS at 14:38

## 2021-08-26 RX ADMIN — HYDROMORPHONE HYDROCHLORIDE 0.5 MG: 1 INJECTION, SOLUTION INTRAMUSCULAR; INTRAVENOUS; SUBCUTANEOUS at 23:06

## 2021-08-26 RX ADMIN — HYDROXYZINE HYDROCHLORIDE 25 MG: 25 TABLET, FILM COATED ORAL at 19:24

## 2021-08-26 RX ADMIN — GABAPENTIN 300 MG: 300 CAPSULE ORAL at 08:10

## 2021-08-26 RX ADMIN — BUPROPION HYDROCHLORIDE 300 MG: 150 TABLET, FILM COATED, EXTENDED RELEASE ORAL at 12:22

## 2021-08-26 RX ADMIN — PANTOPRAZOLE SODIUM 40 MG: 40 TABLET, DELAYED RELEASE ORAL at 21:31

## 2021-08-26 RX ADMIN — DIPHENHYDRAMINE HYDROCHLORIDE 25 MG: 50 INJECTION, SOLUTION INTRAMUSCULAR; INTRAVENOUS at 16:27

## 2021-08-26 RX ADMIN — GABAPENTIN 300 MG: 300 CAPSULE ORAL at 21:31

## 2021-08-26 RX ADMIN — ENOXAPARIN SODIUM 40 MG: 40 INJECTION SUBCUTANEOUS at 23:53

## 2021-08-26 RX ADMIN — ARIPIPRAZOLE 30 MG: 30 TABLET ORAL at 23:59

## 2021-08-26 RX ADMIN — DIPHENHYDRAMINE HYDROCHLORIDE 25 MG: 50 INJECTION, SOLUTION INTRAMUSCULAR; INTRAVENOUS at 18:22

## 2021-08-26 RX ADMIN — ONDANSETRON 4 MG: 2 INJECTION INTRAMUSCULAR; INTRAVENOUS at 14:38

## 2021-08-26 RX ADMIN — POLYETHYLENE GLYCOL 3350 17 G: 17 POWDER, FOR SOLUTION ORAL at 16:06

## 2021-08-26 RX ADMIN — ONDANSETRON 4 MG: 2 INJECTION INTRAMUSCULAR; INTRAVENOUS at 16:25

## 2021-08-26 RX ADMIN — DIPHENHYDRAMINE HYDROCHLORIDE 25 MG: 25 CAPSULE ORAL at 22:50

## 2021-08-26 RX ADMIN — PANTOPRAZOLE SODIUM 40 MG: 40 TABLET, DELAYED RELEASE ORAL at 08:08

## 2021-08-26 RX ADMIN — HYDROMORPHONE HYDROCHLORIDE 0.5 MG: 1 INJECTION, SOLUTION INTRAMUSCULAR; INTRAVENOUS; SUBCUTANEOUS at 19:05

## 2021-08-26 RX ADMIN — GABAPENTIN 300 MG: 300 CAPSULE ORAL at 14:39

## 2021-08-26 RX ADMIN — DICYCLOMINE HYDROCHLORIDE 20 MG: 20 TABLET ORAL at 23:59

## 2021-08-26 ASSESSMENT — ACTIVITIES OF DAILY LIVING (ADL): ADLS_ACUITY_SCORE: 22

## 2021-08-26 NOTE — ED PROVIDER NOTES
Sign out Provider: Hector  Sign out Plan: 31-year-old female presenting with increasing numbness into her lower extremities and back pain currently pending admission to medicine for further evaluation of numbness with MRI.    Reassessment: Patient evaluated and notes palpable lump at her upper lumbar, lower thoracic back which has been present for a few months.  Do not appreciate any fluctuance.  Spinal stimulator was charging.  Patient is receiving Dilaudid as needed for discomfort and pain currently well controlled.  Continues to await inpatient bed placement.    Disposition: Admit           Isabel Peraza MD  08/25/21 2058

## 2021-08-26 NOTE — TELEPHONE ENCOUNTER
Sent 7/26/21 with refills. Not on list for admission.     Will not fill now and await her discharge.     Zach Rosario PA-C  [facility]

## 2021-08-26 NOTE — ED NOTES
Patient reports continued headache, lumbar pain, and licha leg numbness. Patient feels that bump that is midline is getting larger in size. Provider notified. Ice pack given per patient request.

## 2021-08-26 NOTE — ED NOTES
Report given to MAUREEN Evans from 6A prior to transfer via transport. Pt reported itchiness - PRN benadryl given x2, last dose given approx 1825. Pt stated itchiness has occurred with pain medication in past, IV dilaudid. Pt stable, VSS. Belongings sent with patient.  present and used with all interactions.

## 2021-08-26 NOTE — TELEPHONE ENCOUNTER
Pending Prescriptions:                       Disp   Refills    ARIPiprazole (ABILIFY) 30 MG tablet        30 tab*1        Sig: Take 1 tablet (30 mg) by mouth At Bedtime    Routing refill request to provider for review/approval because:  Patient is under current admission, please advise on refill

## 2021-08-27 ENCOUNTER — APPOINTMENT (OUTPATIENT)
Dept: GENERAL RADIOLOGY | Facility: CLINIC | Age: 31
DRG: 552 | End: 2021-08-27
Attending: INTERNAL MEDICINE
Payer: MEDICARE

## 2021-08-27 ENCOUNTER — APPOINTMENT (OUTPATIENT)
Dept: PHYSICAL THERAPY | Facility: CLINIC | Age: 31
DRG: 552 | End: 2021-08-27
Attending: INTERNAL MEDICINE
Payer: MEDICARE

## 2021-08-27 ENCOUNTER — APPOINTMENT (OUTPATIENT)
Dept: OCCUPATIONAL THERAPY | Facility: CLINIC | Age: 31
DRG: 552 | End: 2021-08-27
Attending: INTERNAL MEDICINE
Payer: MEDICARE

## 2021-08-27 LAB
ALBUMIN UR-MCNC: NEGATIVE MG/DL
ANION GAP SERPL CALCULATED.3IONS-SCNC: 4 MMOL/L (ref 3–14)
APPEARANCE UR: CLEAR
BACTERIA #/AREA URNS HPF: ABNORMAL /HPF
BILIRUB UR QL STRIP: NEGATIVE
BUN SERPL-MCNC: 11 MG/DL (ref 7–30)
CALCIUM SERPL-MCNC: 9.3 MG/DL (ref 8.5–10.1)
CHLORIDE BLD-SCNC: 105 MMOL/L (ref 94–109)
CO2 SERPL-SCNC: 27 MMOL/L (ref 20–32)
COLOR UR AUTO: ABNORMAL
CREAT SERPL-MCNC: 0.88 MG/DL (ref 0.52–1.04)
ERYTHROCYTE [DISTWIDTH] IN BLOOD BY AUTOMATED COUNT: 12 % (ref 10–15)
GFR SERPL CREATININE-BSD FRML MDRD: 87 ML/MIN/1.73M2
GLUCOSE BLD-MCNC: 88 MG/DL (ref 70–99)
GLUCOSE UR STRIP-MCNC: NEGATIVE MG/DL
HCT VFR BLD AUTO: 34.9 % (ref 35–47)
HGB BLD-MCNC: 12.1 G/DL (ref 11.7–15.7)
HGB UR QL STRIP: NEGATIVE
KETONES UR STRIP-MCNC: NEGATIVE MG/DL
LEUKOCYTE ESTERASE UR QL STRIP: NEGATIVE
LIPASE SERPL-CCNC: 40 U/L (ref 73–393)
MCH RBC QN AUTO: 33.3 PG (ref 26.5–33)
MCHC RBC AUTO-ENTMCNC: 34.7 G/DL (ref 31.5–36.5)
MCV RBC AUTO: 96 FL (ref 78–100)
MUCOUS THREADS #/AREA URNS LPF: PRESENT /LPF
NITRATE UR QL: NEGATIVE
PH UR STRIP: 6 [PH] (ref 5–7)
PLAT MORPH BLD: NORMAL
PLATELET # BLD AUTO: 271 10E3/UL (ref 150–450)
POTASSIUM BLD-SCNC: 3.9 MMOL/L (ref 3.4–5.3)
RBC # BLD AUTO: 3.63 10E6/UL (ref 3.8–5.2)
RBC MORPH BLD: NORMAL
RBC URINE: <1 /HPF
SODIUM SERPL-SCNC: 136 MMOL/L (ref 133–144)
SP GR UR STRIP: 1.01 (ref 1–1.03)
SQUAMOUS EPITHELIAL: 3 /HPF
UROBILINOGEN UR STRIP-MCNC: NORMAL MG/DL
WBC # BLD AUTO: 4.8 10E3/UL (ref 4–11)
WBC URINE: 1 /HPF

## 2021-08-27 PROCEDURE — 97530 THERAPEUTIC ACTIVITIES: CPT | Mod: GP

## 2021-08-27 PROCEDURE — 72080 X-RAY EXAM THORACOLMB 2/> VW: CPT

## 2021-08-27 PROCEDURE — 85027 COMPLETE CBC AUTOMATED: CPT | Performed by: STUDENT IN AN ORGANIZED HEALTH CARE EDUCATION/TRAINING PROGRAM

## 2021-08-27 PROCEDURE — 120N000002 HC R&B MED SURG/OB UMMC

## 2021-08-27 PROCEDURE — 36415 COLL VENOUS BLD VENIPUNCTURE: CPT | Performed by: STUDENT IN AN ORGANIZED HEALTH CARE EDUCATION/TRAINING PROGRAM

## 2021-08-27 PROCEDURE — 81001 URINALYSIS AUTO W/SCOPE: CPT | Performed by: STUDENT IN AN ORGANIZED HEALTH CARE EDUCATION/TRAINING PROGRAM

## 2021-08-27 PROCEDURE — 80048 BASIC METABOLIC PNL TOTAL CA: CPT | Performed by: STUDENT IN AN ORGANIZED HEALTH CARE EDUCATION/TRAINING PROGRAM

## 2021-08-27 PROCEDURE — 250N000013 HC RX MED GY IP 250 OP 250 PS 637: Performed by: STUDENT IN AN ORGANIZED HEALTH CARE EDUCATION/TRAINING PROGRAM

## 2021-08-27 PROCEDURE — 250N000011 HC RX IP 250 OP 636: Performed by: STUDENT IN AN ORGANIZED HEALTH CARE EDUCATION/TRAINING PROGRAM

## 2021-08-27 PROCEDURE — 72080 X-RAY EXAM THORACOLMB 2/> VW: CPT | Mod: 26 | Performed by: STUDENT IN AN ORGANIZED HEALTH CARE EDUCATION/TRAINING PROGRAM

## 2021-08-27 PROCEDURE — 97165 OT EVAL LOW COMPLEX 30 MIN: CPT | Mod: GO | Performed by: OCCUPATIONAL THERAPIST

## 2021-08-27 PROCEDURE — 97161 PT EVAL LOW COMPLEX 20 MIN: CPT | Mod: GP

## 2021-08-27 PROCEDURE — 83690 ASSAY OF LIPASE: CPT | Performed by: STUDENT IN AN ORGANIZED HEALTH CARE EDUCATION/TRAINING PROGRAM

## 2021-08-27 PROCEDURE — 97535 SELF CARE MNGMENT TRAINING: CPT | Mod: GO | Performed by: OCCUPATIONAL THERAPIST

## 2021-08-27 PROCEDURE — 250N000013 HC RX MED GY IP 250 OP 250 PS 637: Performed by: EMERGENCY MEDICINE

## 2021-08-27 PROCEDURE — 97116 GAIT TRAINING THERAPY: CPT | Mod: GP

## 2021-08-27 PROCEDURE — 99233 SBSQ HOSP IP/OBS HIGH 50: CPT | Performed by: STUDENT IN AN ORGANIZED HEALTH CARE EDUCATION/TRAINING PROGRAM

## 2021-08-27 PROCEDURE — 99223 1ST HOSP IP/OBS HIGH 75: CPT | Mod: GC | Performed by: PHYSICAL MEDICINE & REHABILITATION

## 2021-08-27 RX ORDER — METHOCARBAMOL 500 MG/1
500 TABLET, FILM COATED ORAL 4 TIMES DAILY PRN
Status: DISCONTINUED | OUTPATIENT
Start: 2021-08-27 | End: 2021-08-29 | Stop reason: HOSPADM

## 2021-08-27 RX ADMIN — HYDROMORPHONE HYDROCHLORIDE 0.5 MG: 1 INJECTION, SOLUTION INTRAMUSCULAR; INTRAVENOUS; SUBCUTANEOUS at 06:43

## 2021-08-27 RX ADMIN — GABAPENTIN 300 MG: 300 CAPSULE ORAL at 15:59

## 2021-08-27 RX ADMIN — OXYCODONE HYDROCHLORIDE 5 MG: 5 TABLET ORAL at 05:36

## 2021-08-27 RX ADMIN — HYDROMORPHONE HYDROCHLORIDE 0.5 MG: 1 INJECTION, SOLUTION INTRAMUSCULAR; INTRAVENOUS; SUBCUTANEOUS at 21:54

## 2021-08-27 RX ADMIN — DICYCLOMINE HYDROCHLORIDE 20 MG: 20 TABLET ORAL at 23:42

## 2021-08-27 RX ADMIN — DIPHENHYDRAMINE HYDROCHLORIDE 25 MG: 25 CAPSULE ORAL at 20:17

## 2021-08-27 RX ADMIN — ARIPIPRAZOLE 30 MG: 30 TABLET ORAL at 23:42

## 2021-08-27 RX ADMIN — DICYCLOMINE HYDROCHLORIDE 20 MG: 20 TABLET ORAL at 05:36

## 2021-08-27 RX ADMIN — BUPROPION HYDROCHLORIDE 300 MG: 150 TABLET, FILM COATED, EXTENDED RELEASE ORAL at 08:11

## 2021-08-27 RX ADMIN — GABAPENTIN 300 MG: 300 CAPSULE ORAL at 20:17

## 2021-08-27 RX ADMIN — ONDANSETRON 4 MG: 4 TABLET, ORALLY DISINTEGRATING ORAL at 12:32

## 2021-08-27 RX ADMIN — OXYCODONE HYDROCHLORIDE 5 MG: 5 TABLET ORAL at 00:00

## 2021-08-27 RX ADMIN — OXYCODONE HYDROCHLORIDE 5 MG: 5 TABLET ORAL at 21:04

## 2021-08-27 RX ADMIN — HYDROMORPHONE HYDROCHLORIDE 0.5 MG: 1 INJECTION, SOLUTION INTRAMUSCULAR; INTRAVENOUS; SUBCUTANEOUS at 11:01

## 2021-08-27 RX ADMIN — OXYCODONE HYDROCHLORIDE 5 MG: 5 TABLET ORAL at 12:41

## 2021-08-27 RX ADMIN — DICYCLOMINE HYDROCHLORIDE 20 MG: 20 TABLET ORAL at 12:32

## 2021-08-27 RX ADMIN — HYDROMORPHONE HYDROCHLORIDE 0.5 MG: 1 INJECTION, SOLUTION INTRAMUSCULAR; INTRAVENOUS; SUBCUTANEOUS at 15:59

## 2021-08-27 RX ADMIN — GABAPENTIN 300 MG: 300 CAPSULE ORAL at 08:11

## 2021-08-27 RX ADMIN — ENOXAPARIN SODIUM 40 MG: 40 INJECTION SUBCUTANEOUS at 23:42

## 2021-08-27 RX ADMIN — PANTOPRAZOLE SODIUM 40 MG: 40 TABLET, DELAYED RELEASE ORAL at 08:10

## 2021-08-27 RX ADMIN — PANTOPRAZOLE SODIUM 40 MG: 40 TABLET, DELAYED RELEASE ORAL at 20:17

## 2021-08-27 RX ADMIN — DIPHENHYDRAMINE HYDROCHLORIDE 25 MG: 25 CAPSULE ORAL at 12:41

## 2021-08-27 RX ADMIN — DULOXETINE HYDROCHLORIDE 60 MG: 30 CAPSULE, DELAYED RELEASE ORAL at 08:11

## 2021-08-27 RX ADMIN — DIPHENHYDRAMINE HYDROCHLORIDE 25 MG: 25 CAPSULE ORAL at 12:45

## 2021-08-27 ASSESSMENT — ACTIVITIES OF DAILY LIVING (ADL)
ADLS_ACUITY_SCORE: 23
PREVIOUS_RESPONSIBILITIES: MEAL PREP;HOUSEKEEPING;LAUNDRY;SHOPPING;MEDICATION MANAGEMENT;FINANCES;DRIVING
ADLS_ACUITY_SCORE: 19
ADLS_ACUITY_SCORE: 23
ADLS_ACUITY_SCORE: 19
ADLS_ACUITY_SCORE: 23
ADLS_ACUITY_SCORE: 22

## 2021-08-27 NOTE — PROGRESS NOTES
[facility]    Medicine Progress Note - Hospitalist Service, Gold 11       Date of Admission:  8/24/2021    Assessment & Plan        Katy Islas is a 31 year old female with history of chronic back pain, bipolar disorder, borderline personality disorder and Crohn's Disease admitted for mental health concerns and acute on chronic back pain.    Today's plan   -Spoke to patient using    -Ordered Urine studies, lipase levels and lumbar MRI given symptoms more than 6 weeks   -Consulted pain service     IV drips and antimicrobials      Plan     Acute Pain in lower back 8/10 with some radiation down rgt leg, muscle spasm mentioned 8/27   Patient does mentions some worsening symptoms of her pain, No signs of saddle area anasthesia, bowel or bladder incontinence concerning for cord compression, Pain does not related to pancreatitis, urine infection or intraabdominal pathology     Prn dilaudid  For acute nociceptive pain   Prn methocarbamol for muscle spasm    prn gabapentin Neuropathic pain       Chronic Lower back pain rated 8/10   Patient is  on hydrocodone-acetaminophen outpt last  fill 6/7/2021,8/5 clonazepam,gabapentin 7/29, Had ct abdomen&chest  8/24 remkble for renal stones , urine 8/24 - , lipase 5/26 - ,  Lumbar MRI 12/2016 disc disease     Depression PTA Duloxetine and bupropion    Agitation PRN Aripiprazole   Nicotine addiction PRN nicotine patch   Gerd PTA famotidine   Abdominal spasms dicyclomine   H/o crohns      Diet: Combination Diet Regular Diet Adult    DVT Prophylaxis: Enoxaparin (Lovenox) SQ  Carrion Catheter: Not present  Central Lines: None  Code Status: Full Code      Disposition Plan   Expected discharge: 08/30/2021   recommended to prior living arrangement once adequate pain management/ tolerating PO medications and safe disposition plan/ TCU bed available.     The patient's care was discussed with the Bedside Nurse and  Patient.    Crescencio Cortes MD  Hospitalist Service, [address]  Securely message with the [employer] Web Console (learn more here)  Text page via [facility] Paging/Directory  Please see sign in/sign out for up to date coverage information    Clinically Significant Risk Factors Present on Admission                ______________________________________________________________________    Interval History   Patient has some back pain, No new abdominal pain, difficulty urinating or malaise.     Data reviewed today: I reviewed all medications, new labs and imaging results over the last 24 hours. I personally reviewed no images or EKG's today.    Physical Exam   Vital Signs: Temp: 98.5  F (36.9  C) Temp src: Oral BP: (!) 142/87 Pulse: 79   Resp: 16 SpO2: 99 % O2 Device: None (Room air)    Weight: 0 lbs 0 oz  Constitutional: awake, alert, cooperative, no apparent distress, and appears stated age  Eyes: Lids and lashes normal, pupils equal, round and reactive to light, extra ocular muscles intact, sclera clear, conjunctiva normal  ENT: Normocephalic, without obvious abnormality, atraumatic, sinuses nontender on palpation, external ears without lesions, oral pharynx with moist mucous membranes, tonsils without erythema or exudates, gums normal and good dentition.  Hematologic / Lymphatic: no cervical lymphadenopathy  Respiratory: No increased work of breathing, good air exchange, clear to auscultation bilaterally, no crackles or wheezing  Cardiovascular: Normal apical impulse, regular rate and rhythm, normal S1 and S2, no S3 or S4, and no murmur noted  GI: No scars, normal bowel sounds, soft, non-distended, non-tender, no masses palpated, no hepatosplenomegally  Genitounirinary:   Skin: no bruising or bleeding  Musculoskeletal: There is no redness, warmth, or swelling of the joints.  Full range of motion noted.  Motor strength is 5 out of 5 all extremities bilaterally.  Tone is  normal.  Neurologic: Awake, alert, oriented to name, place and time.  Cranial nerves II-XII are grossly intact.  Motor is 5 out of 5 bilaterally.  Cerebellar finger to nose, heel to shin intact.  Sensory is intact.  Babinski down going, Romberg negative, and gait is normal.  Neuropsychiatric: General: normal, calm and normal eye contact    Data   Recent Labs   Lab 08/27/21  0639 08/24/21  1905 08/24/21  1816 08/23/21  1457   WBC 4.8  --  7.2 5.6   HGB 12.1  --  14.4 13.9   MCV 96  --  96 99     --  440 352   INR  --  1.01  --   --      --  136  --    POTASSIUM 3.9  --  4.0  --    CHLORIDE 105  --  104  --    CO2 27  --  29  --    BUN 11  --  16  --    CR 0.88  --  1.05*  --    ANIONGAP 4  --  3  --    SARANYA 9.3  --  9.4  --    GLC 88  --  80  --    ALBUMIN  --   --  4.5  --    PROTTOTAL  --   --  8.4  --    BILITOTAL  --   --  0.4  --    ALKPHOS  --   --  120  --    ALT  --   --  29  --    AST  --   --  18  --    TROPONIN  --   --  <0.015  --      No results found for this or any previous visit (from the past 24 hour(s)).  Medications       ARIPiprazole  30 mg Oral At Bedtime     buPROPion  300 mg Oral QAM     dicyclomine  20 mg Oral Q6H     DULoxetine  60 mg Oral Daily     enoxaparin ANTICOAGULANT  40 mg Subcutaneous Q24H     famotidine  20 mg Intravenous Once     gabapentin  300 mg Oral TID     nicotine   Transdermal Q8H     pantoprazole  40 mg Oral BID     sodium chloride (PF)  3 mL Intracatheter Q8H

## 2021-08-27 NOTE — PLAN OF CARE
Status: Pt admitted from [facility]. Extensive health history, admitted with concern of N/T in lower extremities, increased bruising to BLE and back pain. To ensure pt has , please call  services 1 hr before present  in done.  here from [phone] 8/26  Vitals: VSS on RA  Neuros: Pt is deaf, talks/uses ASL to communicate. A&Ox4, N/T in BLE, previously reported pain in eyes when tracking far right and far left, denied overnight but did refuse pupil assessment d/t pain. 4/5 t/o, generalized weakness  IV: PIV SL  Diet: Reg  Bowel status: LBM unknown, pt estimates 8/23. Audible BS  : Voiding without difficulty  Pain: HA and back pain managed with PRN IV dilaudid and oxycodone, MD paged this shift per pt. Request d/t inadequate pain control on current regimen however pt. Resting comfortably t/o shift after dilaudid and oxy-benadryl to be given 15 min beforehand  Activity: Assist of 1-2, FLORENCIO walker   Plan: T/L xray and spine MRI. PT/OT/SW consults. Consider neurology evaluation. Consider mental health consult.

## 2021-08-27 NOTE — PROGRESS NOTES
08/27/21 1400   Quick Adds   Type of Visit Initial Occupational Therapy Evaluation       Present yes   Language ASL   Living Environment   People in home alone   Current Living Arrangements mobile home   Home Accessibility stairs to enter home;stairs within home   Number of Stairs, Main Entrance 3   Stair Railings, Main Entrance railings on both sides of stairs   Number of Stairs, Within Home, Primary 2   Stair Railings, Within Home, Primary railings on both sides of stairs   Transportation Anticipated car, drives self   Living Environment Comments Pt lives alone in mobile home, reports no family in the area but a neighbor close by that is a nurse. Has 3 CHATO and 2 stairs within home between kitchen and living room, most needs met on one level. Bathroom set up has tub/shower with shower chair, grab bars in shower and toilet. Mom and brother live in [place], pt reports she is planning to move there with them when she is feeling better, but they have 20 stairs to bedroom upstairs which is a concern.    Self-Care   Usual Activity Tolerance moderate   Current Activity Tolerance poor   Regular Exercise No   Equipment Currently Used at Home grab bar, toilet;grab bar, tub/shower;shower chair;walker, rolling   Activity/Exercise/Self-Care Comment Pt IND for ADLs, reported some recent difficulties at baseline with standing tolerance/balance. Reported using 2WW for all mobility. Limited mobility 2/2 chronic low back pain since 2011. Pt reports long history of numbness/tingling into B LEs/feet that has worsened more recently. Pt is looking into getting a walker with a seat.    Instrumental Activities of Daily Living (IADL)   Previous Responsibilities meal prep;housekeeping;laundry;shopping;medication management;finances;driving   IADL Comments Pt responsible for most IADLs, reports using med box for managing meds but has difficulties/forgets setting it up sometimes. Reported her mom used to help her set  "it up, but recently moved so no longer has A, sometimes misses taking doses. Pt drives, but reported long drives make her legs feel weak/numb, so she sometimes requires A to get out of car. Reported difficulties with meal prep, standing and stirring is difficult with LE weakness, sometimes gets food from food shelf.    Disability/Function   Hearing Difficulty or Deaf yes   Patient's preferred means of communication    Describe hearing loss bilateral hearing loss   Use of hearing assistive devices right hearing aid   Hearing Management R hearing aid,    Wear Glasses or Blind yes   Vision Management glasses   Concentrating, Remembering or Making Decisions Difficulty no   Difficulty Communicating no   Difficulty Eating/Swallowing no   Walking or Climbing Stairs Difficulty yes   Walking or Climbing Stairs ambulation difficulty, requires equipment   Mobility Management walker at baseline   Dressing/Bathing Difficulty yes   Dressing/Bathing bathing difficulty, requires equipment   Dressing/Bathing Management shower chair and grab bars   Toileting issues yes   Toileting Management grab bars   Toileting Assistance toileting difficulty, requires equipment   Doing Errands Independently Difficulty (such as shopping) no   Fall history within last six months yes   Number of times patient has fallen within last six months   (Pt unable to report specific number, reported often )   Change in Functional Status Since Onset of Current Illness/Injury yes   General Information   Onset of Illness/Injury or Date of Surgery 08/24/21   Referring Physician Dorothy Beebe, DO   Patient/Family Therapy Goal Statement (OT) Return to PLOF   Additional Occupational Profile Info/Pertinent History of Current Problem Per chart, \"Katy Islas is a 31 year old female with history of chronic back pain, bipolar disorder, borderline personality disorder and Crohn's Disease admitted for mental health concerns and " "acute on chronic back pain.\"   Performance Patterns (Routines, Roles, Habits) Pt lives alone in mobile home, mother and brother live in [place], completes IADLs IND   Existing Precautions/Restrictions fall   General Observations and Info Act: Up with A   Cognitive Status Examination   Orientation Status orientation to person, place and time   Affect/Mental Status (Cognitive) WFL   Follows Commands WFL   Cognitive Status Comments Cognition seems intact   Visual Perception   Visual Impairment/Limitations corrective lenses full-time   Impact of Vision Impairment on Function (Vision) Glasses full time, reported vision has worsened recently but is OK with glasses on   Sensory   Sensory Quick Adds No deficits were identified   Pain Assessment   Patient Currently in Pain Yes, see Vital Sign flowsheet   Integumentary/Edema   Integumentary/Edema no deficits were identifed   Posture   Posture not impaired   Range of Motion Comprehensive   General Range of Motion no range of motion deficits identified   Strength Comprehensive (MMT)   General Manual Muscle Testing (MMT) Assessment no strength deficits identified   Muscle Tone Assessment   Muscle Tone Quick Adds No deficits were identified   Bed Mobility   Bed Mobility No deficits identified   Comment (Bed Mobility) SBA   Transfers   Transfers sit-stand transfer   Transfer Comments CGA with 2WW   Sit-Stand Transfer   Sit-Stand [place] (Transfers) contact guard   Assistive Device (Sit-Stand Transfers) walker, front-wheeled   Sit/Stand Transfer Comments CGA   Balance   Balance Assessment standing balance: dynamic   Standing Balance: Dynamic fair balance   Balance Comments Somewhat unstable balance upon ambulation with 2WW   Activities of Daily Living   BADL Assessment toileting;feeding;lower body dressing   Lower Body Dressing Assessment   [place] Level (Lower Body Dressing) modified independence   Position (Lower Body Dressing) sitting up in bed   Comment (Lower Body " Dressing) SBA   Eating/Self Feeding   [place] Level (Feeding) modified independence   Position (Self-Feeding) sitting up in bed   Comment (Feeding) Mod I    Toileting   [place] Level (Toileting) contact guard assist   Comment (Toileting) STS from toilet with use of grab bar   Clinical Impression   Criteria for Skilled Therapeutic Interventions Met (OT) yes;meets criteria;skilled treatment is necessary   OT Diagnosis Decreased I/ADL independence    OT Problem List-Impairments impacting ADL problems related to;activity tolerance impaired;balance;strength;pain   Assessment of Occupational Performance 3-5 Performance Deficits   Identified Performance Deficits Dressing, bathing, home management, med management, meal prep   Planned Therapy Interventions (OT) ADL retraining;IADL retraining;strengthening;home program guidelines;progressive activity/exercise   Clinical Decision Making Complexity (OT) low complexity   Therapy Frequency (OT) 6x/week   Predicted Duration of Therapy 1 week   Risk & Benefits of therapy have been explained evaluation/treatment results reviewed;care plan/treatment goals reviewed;risks/benefits reviewed;participants voiced agreement with care plan;participants included;patient   Comment-Clinical Impression Pt presents below baseline for functional mobility and I/ADLs    OT Discharge Planning    OT Discharge Recommendation (DC Rec) Acute Rehab Center-Motivated patient will benefit from intensive, interdisciplinary therapy.  Anticipate will be able to tolerate 3 hours of therapy per day   OT Rationale for DC Rec Pt presents well below baseline for I/ADL independence and functional mobillity, would benefit from further skilled rehab for increased activity tolerance and strength for safe d/c home    OT Brief overview of current status  Ax1 with 2WW in room mobility   Total Evaluation Time (Minutes)   Total Evaluation Time (Minutes) 10

## 2021-08-27 NOTE — UTILIZATION REVIEW
[facility] Utilization Review  Admission Status; Secondary Review Determination     Admission Date: 8/24/2021  5:37 PM      Under the authority of the Utilization Management Committee, the utilization review process indicated a secondary review on the above patient.  The review outcome is based on review of the medical records, discussions with staff, and applying clinical experience noted on the date of the review.        (X)      Inpatient Status Appropriate - This patient's medical care is consistent with medical management for inpatient care and reasonable inpatient medical practice.          RATIONALE FOR DETERMINATION   Katy Islas is a 31 year old female with past medical history of depression, chronic back pain, who presented to the emergency room with complaints of worsening lower extremity weakness and severe back pain along with falls and injury.  Initial work-up is negative for fractures, but noted tenderness in the spinal area with significant bruising.  She is started on pain control with oral and IV opioids, PT/OT with plans for safe disposition.  However, her pain remains suboptimally controlled despite IV and oral medications and cares are rendered complex due to con commitment mental health issues.  In light of suboptimally controlled pain, plan is to proceed with neurological surgery consultation, possible lumbar MRI which she had refused in the past.  She is still requiring oral opioids and plan is to transition from IV to orals only.  In light of complexity of care, ongoing symptoms with suboptimal pain control, need for further work-up and possible neurological surgery consultation and safe disposition planning with anticipated length of stay more than 2 midnights, criteria for inpatient admission is met.             The definitions of Inpatient Status and Observation Status used in making the determination above are those provided in the CMS Coverage Manual, Chapter 1 and  Chapter 6, section 70.4.      Sincerely,       Bonnie Grayson MD, MS  Physician Advisor  Utilization Review-[place]    Phone: [phone]

## 2021-08-27 NOTE — CONSULTS
[facility]   PM&R consultation note  Patient Name: Katy Islas : 1990 Medical Record: [id number]  Consulting Provider: Crescencio Cortes MD  Reason for consult: Rehabilitation placement recommendations  Location of patient:   Date of encounter: 2021   Date of Admission: 21    Impression:    Katy Islas is a 31 year old female with a hx of Crohn's disease, fibromyalgia, migraines, bipolar disorder, borderline personality disorder, chronic back pain s/p L SI fusion, coccyx removal, L4-5 posterior fusion, SCS placement with LE weakness in the setting of acute on chronic back pain.       Recommendations:     #Back Pain, acute on chronic  #LE weakness  Her current pattern of weakness/paresthesias seems to be primarily give way on exam. This currently seems to be most consistent with conversion disorder at this time particularly in the setting of recent MH stressors as it is difficult to localize/explain. Agree with MRI to rule out further causes due to hx of prior hardware and the mass near her prior incision. Exam is reassuring without any upper more motor neuron signs an focal findings. She is overall relatively strong with many activities, such as standing to adjust her own. If found to be conversion disorder, the best treatment is consistent therapies  -Agree with lumbar MRI     # Rehabilitation Recommendations:   Patient would not be a candidate for ARU as her medical needs would not qualify for the medical necessity of physiatrist oversight. Pending no acute findings of further workup/imaging, she would be a candidate for the TCU and will likely require PT, OT as she is below her functional baseline and would likely benefit from continued intensive therapy    Thank you for this consult, please call with questions.     Danny Guerrero MD  PM&R PGY2     Seen and discussed with Dr. Victoria , PM&R staff physician    History of Present Illness:  Katy  SHELLY Islas is a 31 year old female with a hx of Crohn's disease, fibromyalgia, migraines, bipolar disorder, borderline personality disorder, chronic back pain s/p L SI fusion, coccyx removal, L4-5 posterior fusion, SCS placement who initially presented to the ED with multiple complaints, including 3 weeks of acute on chronic back pain and poor control of mental health.    Today patient tells me she is here regarding her back pain that has been worsening over the past 2-3 months associated with lower extremity weakness, paresthesias, and multiple falls over that time period.  She also notes that during the same time period, there has been a small lump growing on her low back.  She is unsure if this is related.  Her left leg seems to be weaker than her right and she is unsure if this is due to pain or true weakness.  Altered sensation is localized to paresthesias along the lateral aspect of her left leg with more numbness on the medial edge, as well as some numbness on her face.  Currently her pain is reasonably controlled on current pain regimen, though she expresses concern that this could be something more serious given how it has progressed.  She has longstanding back pain and has had multiple procedures on her back, most recently placement of an implanted spinal cord stimulator (September 2020) that failed to improve her pain.  Currently she denies any urinary incontinence, saddle anesthesia. Endorses photosensitivity, headache, neck pain.     In review of the therapy notes,   PT: Noted to be able to stand independently when adjusting hospital crown.  Ambulating with CGA and FWW for short distance, relying heavily on walker and upper extremities with lower extremity buckling throughout.  Varied balance depending on task.  Overall Ax1, GB, FWW utilized.  Recommending ARU.    OT: Transfers with walker, CGA. Unstable balance with ambulation with 2WW.  Mod I with lower body dressing, eating.  Overall a x1 and walker  with in room mobility.  Recommending ARU.    Social/Functional History:  Pt was independent with all ADLs/iADLs, transfers, mobility and gait.  She uses a walker, shower chair, grab bar at baseline.  She does drive.    - Abode:  Mobile home with 3 stairs to enter, planning to move in the recent future to [facility] to be with brother/mother in multilevel house  - Living situation:  Alone  - Family support:  Good relationship with family  - Tobacco use: The patient reports that she has been smoking cigarettes. She has a 1.25 pack-year smoking history. She has never used smokeless tobacco.  - Alcohol use: The patient reports previous alcohol use.  - Illicit drug use:  Denies      ROS:    Review of Systems:  Negative 10 point review of systems unless noted in the HPI.     Past Medical/Surgical History:  Past Medical History:   Diagnosis Date     Abdominal pain 10/31- 11/4/2005    [facility] admit for Crohn's     Allergic rhinitis, cause unspecified     Allergic rhinitis     Arthritis      C. difficile diarrhea     Past, no current diarrhea.     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, without mention of intractable epilepsy     pseudoseizures diagnosed after extensive neurologic eval     Migraine 07/21/12    D/C 07/22/12-[facility]     Migraine, unspecified, without mention of intractable migraine without mention of status migrainosus     Migraine     Mild intermittent asthma     mild intermittent     Mycoplasma infection in conditions  classified elsewhere and of unspecified site      Other chronic pain     Back pain for 6 years     Renal disease     Kidney stones     Unspecified hearing loss     congenital hearing loss     Past Surgical History:   Procedure Laterality Date     AS REMOVAL OF COCCYX  10/18/2017     C FUSION OF SACROILIAC JOINT  10/26/2018     COLONOSCOPY  7/1/2009    [facility].     COLONOSCOPY N/A 7/17/2019    Procedure: Colonoscopy, With Polypectomy And Biopsy;  Surgeon: Edwin Conde MD;  Location: [facility] OR     COLONOSCOPY WITH CO2 INSUFFLATION N/A 7/17/2019    Procedure: COLONOSCOPY, WITH CO2 INSUFFLATION;  Surgeon: Edwin Conde MD;  Location: [facility] OR     COMBINED ESOPHAGOSCOPY, GASTROSCOPY, DUODENOSCOPY (EGD) WITH CO2 INSUFFLATION N/A 4/12/2019    Procedure: COMBINED ESOPHAGOSCOPY, GASTROSCOPY, DUODENOSCOPY (EGD) WITH CO2 INSUFFLATION;  Surgeon: Edwin Conde MD;  Location: [facility] OR     ESOPHAGOSCOPY, GASTROSCOPY, DUODENOSCOPY (EGD), COMBINED N/A 4/12/2019    Procedure: Combined Esophagoscopy, Gastroscopy, Duodenoscopy (Egd), Biopsy Single Or Multiple;  Surgeon: Edwin Conde MD;  Location: [facility] OR     FISTULOTOMY RECTUM N/A 4/30/2020    Procedure: EXAM UNDER ANESTHESIA, ANUS, parital fistulotomy;  Surgeon: Valentin Sanchez MD;  Location: [facility]     FUSION SPINE POSTERIOR MINIMALLY INVASIVE ONE LEVEL N/A 2/23/2017    Procedure: FUSION SPINE POSTERIOR MINIMALLY INVASIVE ONE LEVEL;  L4-5 Oblique Lateral Lumbar Interbody Fusion   Epidural steroid injection.   Transpedicular Bone marrow aspiration;  Surgeon: Jeniffer Eugene MD;  Location: [facility] OR     HC EEG AWAKE AND SLEEP      abnormal     HC MRI BRAIN W/O CONTRAST  12/00    normal     HC REMOVAL GALLBLADDER  8/5/2009    [facility].     [facility] UGI ENDOSCOPY DIAG W BIOPSY  11/11/09    Normal esophagus     ORTHOPEDIC SURGERY  October 19,2011    diskectomy L4-L5     [facility] COLONOSCOPY THRU STOMA  WITH BIOPSY  10/29/2003    Impression is that of normal appearing colonoscopy, without evidence of rectal bleeding.     [facility] COLONOSCOPY THRU STOMA, DIAGNOSTIC  10/00    normal     [facility] COLONOSCOPY THRU STOMA, DIAGNOSTIC  Oct 2009    Dr López- normal     [facility] UGI ENDOSCOPY, SIMPLE EXAM  7/00, 10/00    mild chronic esophagitis and duodenitis, neg H pylori     [facility] UGI ENDOSCOPY, SIMPLE EXAM  01/20/2005    Esophagogastroduodenoscopy, colonoscopy with biopsies.  [facility]     [facility]  7/[facility]    [facility]     [facility] UGI ENDOSCOPY, SIMPLE EXAM  11/11/2009    attempted upper GI, pt. could not tolerate procedure:[facility]       Family History:  Family History   Problem Relation Age of Onset     Gastrointestinal Disease Brother         severe Crohn's     Neurologic Disorder Brother         Seizures post head injury     Depression Brother      Substance Abuse Brother      Genitourinary Problems Father         kidney stones     Diabetes Father      Heart Disease Father         Open heart surgery     Breast Cancer Maternal Grandmother      Parkinsonism Maternal Grandmother      Cerebrovascular Disease Paternal Grandmother      Cancer Maternal Grandfather         Lung     Cardiovascular Paternal Grandfather         Heart Attack     Substance Abuse Mother         in recovery x1 year      Lung Cancer Paternal Uncle      Cancer Paternal Uncle      Lung Cancer Maternal Uncle      Colon Cancer Maternal Uncle          Medications:  Facility-Administered Medications Prior to Admission   Medication Dose Route Frequency Provider Last Rate Last Admin     medroxyPROGESTERone (DEPO-PROVERA) syringe 150 mg  150 mg Intramuscular Q90 Days Aura Rosario PA-C   150 mg at 08/23/21 1523     Medications Prior to Admission   Medication Sig Dispense Refill Last Dose     albuterol (PROAIR HFA/PROVENTIL HFA/VENTOLIN HFA) 108 (90 Base) MCG/ACT inhaler  Inhale 2 puffs into the lungs every 6 hours as needed for shortness of breath / dyspnea or wheezing 18 g 3  at prn     albuterol (PROVENTIL) (2.5 MG/3ML) 0.083% neb solution Take 1 vial (2.5 mg) by nebulization every 6 hours as needed for shortness of breath / dyspnea or wheezing 500 mL 3  at prn     ARIPiprazole (ABILIFY) 30 MG tablet Take 1 tablet (30 mg) by mouth At Bedtime 30 tablet 1 Past Week     buPROPion (WELLBUTRIN XL) 300 MG 24 hr tablet Take 1 tablet (300 mg) by mouth every morning 90 tablet 3 Past Week     diclofenac (VOLTAREN) 1 % topical gel Place 2 g onto the skin 4 times daily as needed for moderate pain Stop if any gi upset or symptoms 100 g 0  at prn     dicyclomine (BENTYL) 20 MG tablet Take 1 tablet (20 mg) by mouth every 6 hours 90 tablet 3 8/24/2021     DULoxetine (CYMBALTA) 30 MG capsule Take 2 capsules (60 mg) by mouth daily 90 capsule 1 Past Week     EPINEPHrine (ANY BX GENERIC EQUIV) 0.3 MG/0.3ML injection 2-pack Inject 0.3 mLs (0.3 mg) into the muscle once as needed for anaphylaxis 0.6 mL 3  at prn     furosemide (LASIX) 20 MG tablet Take 1 tablet (20 mg) by mouth daily as needed (edema) 30 tablet 4 Past Week     gabapentin (NEURONTIN) 300 MG capsule Take 1 capsule (300 mg) by mouth 3 times daily 270 capsule 3 8/24/2021     lidocaine (XYLOCAINE) 2 % solution Swish and swallow 15 mLs in mouth every 4 hours as needed for other (GERD) ; Max 8 doses/24 hour period. Mix with 15 mLs Maalox or Mylanta. 100 mL 3 Past Week     metoprolol succinate ER (TOPROL-XL) 50 MG 24 hr tablet Take 1 tablet (50 mg) by mouth daily 90 tablet 3 8/24/2021     ondansetron (ZOFRAN-ODT) 4 MG ODT tab Take 1 tablet (4 mg) by mouth every 8 hours as needed for nausea 60 tablet 0  at prn     pantoprazole (PROTONIX) 40 MG EC tablet Take 1 tablet (40 mg) by mouth 2 times daily Take 30-60 minutes before a meal. 180 tablet 3 8/24/2021     sucralfate (CARAFATE) 1 GM tablet Take 1 tablet (1 g) by mouth 4 times daily as needed  (heartburn) 360 tablet 3  at prn     aluminum chloride (DRYSOL) 20 % external solution Apply topically At Bedtime (Patient not taking: Reported on 8/25/2021) 60 mL 4 Not Taking     azelastine (OPTIVAR) 0.05 % ophthalmic solution Place 1 drop into both eyes 2 times daily (Patient not taking: Reported on 8/25/2021) 6 mL 11 Not Taking     diltiazem 2% in PLO gel Apply small amount to anal opening three times daily for 8 weeks. (Patient not taking: Reported on 8/25/2021) 60 g 0 Not Taking     medroxyPROGESTERone (DEPO-PROVERA) 150 MG/ML IM injection Inject 1 mL (150 mg) into the muscle every 3 months 3 mL 3      mesalamine ER (PENTASA) 500 MG CR capsule Take 1-2 capsules (500-1,000 mg) by mouth 3 times daily (Patient not taking: Reported on 8/25/2021) 180 capsule 11 Not Taking     Multiple Vitamins-Minerals (EQ MULTIVITAMINS ADULT GUMMY) CHEW Take 1 chew tab by mouth daily (Patient not taking: Reported on 8/25/2021)   Not Taking     naproxen (NAPROSYN) 500 MG tablet Take 1 tablet (500 mg) by mouth 2 times daily (with meals) (Patient not taking: Reported on 8/25/2021) 14 tablet 0 Not Taking     NONFORMULARY Apply 30 mLs topically 4 times daily (Patient not taking: Reported on 8/25/2021)   Not Taking     rizatriptan (MAXALT-MLT) 5 MG ODT Take 1-2 tablets (5-10 mg) by mouth at onset of headache for migraine May repeat in 2 hours. Max 6 tablets/24 hours. (Patient not taking: Reported on 8/25/2021) 18 tablet 3 Not Taking       Scheduled meds    ARIPiprazole  30 mg Oral At Bedtime     buPROPion  300 mg Oral QAM     dicyclomine  20 mg Oral Q6H     DULoxetine  60 mg Oral Daily     enoxaparin ANTICOAGULANT  40 mg Subcutaneous Q24H     famotidine  20 mg Intravenous Once     gabapentin  300 mg Oral TID     nicotine   Transdermal Q8H     pantoprazole  40 mg Oral BID     sodium chloride (PF)  3 mL Intracatheter Q8H       PRN meds:  acetaminophen, albuterol, oxyCODONE **AND** diphenhydrAMINE, HYDROmorphone **AND** diphenhydrAMINE,  "lidocaine 4%, lidocaine (buffered or not buffered), melatonin, naloxone **OR** naloxone **OR** [DISCONTINUED] naloxone **OR** [DISCONTINUED] naloxone, nicotine, ondansetron **OR** ondansetron, prochlorperazine **OR** prochlorperazine **OR** prochlorperazine, sodium chloride (PF), sucralfate    Allergies:  Allergies   Allergen Reactions     Iohexol Hives     Other reaction(s): Hives  IV contrast; patient states she tolerates contrast if she gets benadryl before  IV contrast; patient states she tolerates contrast if she gets benadryl before     Other Environmental Allergy Rash     Plastic tape     Baclofen      hives     Bees Hives, Swelling and Difficulty breathing     Contrast Dye      Hives,   Updated 5/10/2016 CT Contrast.     Iodine Hives     Metoclopramide      Other reaction(s): Tremors  LW Reaction: shaking/sweating     Midazolam      Other reaction(s): Agitation     Monosodium Glutamate      Morphine Other (See Comments)     Difficulty with urination     Nsaids Other (See Comments)     GI BLEED x2     Other (Do Not Use) Other (See Comments)     Xanaflex- pt becomes disoriented and loses bladder control     Reglan [Metoclopramide Hcl] Other (See Comments)     shaking     Soma [Carisoprodol] Visual Disturbance     Sleep walking     Tizanidine      Topamax Other (See Comments)     Topamax [Topiramate] Nausea     Tingling  GI/Vomit     Tramadol      Severe Headache, Seizure Risk     Tylenol W/Codeine [Acetaminophen-Codeine] Nausea and Itching     Tylenol 3     Versed Other (See Comments)     Zolmitriptan      Makes face feel like its twitching     Droperidol Anxiety     Flu Virus Vaccine Rash      Arm swelling       Physical Exam:  VITAL SIGNS:  /75 (BP Location: Left arm)   Pulse 79   Temp 98.5  F (36.9  C) (Oral)   Resp 16   SpO2 99%   Breastfeeding No   BMI:  Estimated body mass index is 33.94 kg/m  as calculated from the following:    Height as of 7/26/21: 1.575 m (5' 2.01\").    Weight as of " 8/23/21: 84.2 kg (185 lb 9.6 oz).   Gen: NAD, pleasant and cooperative. ASL   HEENT: NCAT, oropharynx clear with MMM, no LAP, EOMI, PERRL  Lungs: breathing comfortably on RA  Heart: appears well perfused, radial pulses symmetric, no peripheral edema  Abd: benign  Ext: well perfused  Skin: scattered bruises on extremities, well healed surgical incisions (one midline, one on R flank) on low back. Visible firm mass at superior aspect of midline incision, without fluctuance  MSK/Neuro:  Diffuse myofacial pain on palpation     Cognition: AAx3, fluent speech     CN: intact  1. 2nd CN: Pupils equal, round, reactive to light and accomodation. and visual fields intact to confrontation.   2. 3rd,4th,6th CN:  EOMI, appropriate pupillary responses  3. 5th CN: facial sensation intact   4. 7th CN: face symmetrical   5. 8th CN: functional hearing bilaterally  6. 9th, 10th CN: palate elevates symmetrically   7. 11th CN: sternocleidomastoids and trapezii strong   8. 12th CN: tongue midline and without fasciculations       Motor:  Tone normal, no abnormal movements noted. Exam limited by pain, giveway throughout  SF EF EE WE G I HF KE DF EHL PF  R 5/5 5/5 5/5 5/5 5/5 5/5 1/5 2/5 5/5 5/5 5/5  L 5/5 5/5 5/5 5/5 5/5 5/5 1/5 2/5 5/5 5/5 5/5     Sensory: altered in BLE (L>R). L with parethesias laterally, numbness medially     Reflexes: 3+ and symmetrical at Biceps, BR, Triceps, Patellar and Achilles b/l. No clonus                   Damon's test: negative bilaterally                    Babinski reflex: downgoing bilaterally   Tone: normal  Abnormal movements: None observed  Speech: fluent speech via ASL   Gait: not assessed    XR Thoracic Lumbar Spine 2 Views    Result Date: 8/27/2021  2 views lumbar spine radiographs 8/27/2021 10:28 AM History: med spinal tenderness lower thoracic spine Additional History from EMR:  Acute Pain in lower back 8/10 with some radiation down right leg, muscle spasm mentioned 8/27.  Patient does mentions some worsening symptoms of her pain, No signs of saddle area anesthesia, bowel or bladder incontinence concerning for cord compression. Comparison: CT CAP 8/24/2021 Findings: Standing  2 views of the lumbar spine were obtained. 5  lumbar type vertebral bodies are assumed for the purpose of this dictation. There is no acute osseous abnormality.  Surgical fusion hardware at L4-L5 without radiographic evidence of hardware failure. Spinal stimulator device in place with leads terminating at approximately T7-8, similar to prior. Left sacroiliac screws in place. The visualized bowel gas pattern is non-obstructive. Status post cholecystectomy.     Impression: 1.  No acute osseous abnormality. 2.  Lumbar and sacral hardware in similar position to prior. Spinal leads terminating at approximately T7/T8, similar to prior. SABINE ALONSO MD   SYSTEM ID:  [id number]    CT Chest Abdomen Pelvis w/o Contrast    Result Date: 8/24/2021  CT CHEST ABDOMEN PELVIS W/O CONTRAST 8/24/2021 8:52 PM CLINICAL HISTORY: Chest pain or back pain, aortic dissection suspected, history of hypertension and Crohn's disease TECHNIQUE: CT scan of the chest, abdomen, and pelvis was performed without IV contrast. Multiplanar reformats were obtained. Dose reduction techniques were used. Patient allergic to contrast. CONTRAST: None. COMPARISON: CT 5/5/2021 FINDINGS: Limited exam due to patient motion. VASCULAR FINDINGS: Precontrast imaging shows no evidence of intramural hematoma. Normal caliber thoracic and abdominal aorta. This exam is nondiagnostic for aortic dissection. LUNGS AND PLEURA: The central airways are clear. Mosaic lung attenuation. Mild left basilar and lingular consolidation. No pleural effusion. MEDIASTINUM/AXILLAE: No thoracic adenopathy. Upper normal heart size. No pericardial effusion. HEPATOBILIARY: Postcholecystectomy. No biliary ductal dilatation. Grossly unremarkable unenhanced liver. PANCREAS: Normal.  SPLEEN: Normal. ADRENAL GLANDS: Normal. KIDNEYS/BLADDER: Small hyperdense lower right renal cortical region consistent with hemorrhagic cysts. Punctate nonobstructing left renal calcifications. No hydronephrosis, hydroureter or obstructing ureteral stone. Unremarkable urinary bladder. BOWEL: Normal caliber small bowel. The appendix is not clearly visualized. Normal caliber colon. No free air or free fluid. LYMPH NODES: Normal. VASCULATURE: Please see above. PELVIC ORGANS: Normal. OTHER: Thoracic spinal stimulator device. MUSCULOSKELETAL: Left SI joint fusion. L4-5 fusion.     IMPRESSION: 1.  No acute findings. No evidence of a thoracic or abdominal aortic aneurysm or intramural hematoma. Note that contrast was not administered due to patient allergy. This precludes the evaluation for an aortic dissection or pulmonary artery embolism. 2.  Punctate nonobstructing left renal calculi. THOM SCHERER MD   SYSTEM ID:  [id number]    Head CT w/o contrast    Result Date: 8/24/2021  CT SCAN OF THE HEAD WITHOUT CONTRAST   8/24/2021 8:52 PM HISTORY: Dizziness, non-specific. TECHNIQUE:  Axial images of the head and coronal reformations without IV contrast material. Radiation dose for this scan was reduced using automated exposure control, adjustment of the mA and/or kV according to patient size, or iterative reconstruction technique. COMPARISON: CT head 11/2/2011. FINDINGS: There is no evidence of intracranial hemorrhage, mass, acute infarct or anomaly. The ventricles are normal in size, shape and configuration. The brain parenchyma and subarachnoid spaces are normal. Small polypoid soft tissue density lesion in the right nasal cavity, positioned along the right aspect of the nasal septum which may possibly be attached to the anterior inferior aspect of the right middle turbinate (series 6 image 7) measuring approximately 9 mm. This appears to have been present to some degree on previous exams and is nonspecific. This could  potentially represent a nasal polyp but is incompletely characterized. Otherwise, the visualized paranasal sinuses and nasal cavity are unremarkable. The visualized aspects of the mastoid and middle ear cavities are clear. The bony calvarium and bones of the skull base appear intact. On the  images, partially visualized postsurgical changes in the lower lumbar region as well as spinal cord stimulator, incompletely evaluated.     IMPRESSION: 1. No CT findings of acute intracranial process. 2. Small nonspecific polypoid soft tissue lesion in the right nasal cavity, as described. Recommend correlation with direct inspection. MACHO BLANCHARD MD   SYSTEM ID:  [id number]

## 2021-08-27 NOTE — PROGRESS NOTES
08/27/21 0959   Quick Adds   Type of Visit Initial PT Evaluation   Living Environment   People in home alone   Current Living Arrangements mobile home   Home Accessibility stairs to enter home   Number of Stairs, Main Entrance 3   Stair Railings, Main Entrance railings on both sides of stairs   Transportation Anticipated car, drives self   Living Environment Comments Pt lives alone in mobile home, reports no family in the area but a neighbor close by that is a nurse. Mom and brother live in [place], pt reports she is planning to move there with them when she is feeling better.   Self-Care   Usual Activity Tolerance moderate   Current Activity Tolerance poor   Regular Exercise No   Equipment Currently Used at Home grab bar, toilet;grab bar, tub/shower;shower chair;walker, rolling  (bedrail)   Activity/Exercise/Self-Care Comment Pt reports using walker for all mobility. Limited mobility 2/2 chronic low back pain since 2011. Pt reports long history of numbness/tingling into B LEs/feet that has worsened more recently.   Disability/Function   Hearing Difficulty or Deaf yes   Patient's preferred means of communication    Describe hearing loss bilateral hearing loss   Use of hearing assistive devices right hearing aid   Hearing Management R hearing aid,    Wear Glasses or Blind yes   Vision Management glasses   Concentrating, Remembering or Making Decisions Difficulty no   Difficulty Communicating no   Difficulty Eating/Swallowing no   Walking or Climbing Stairs Difficulty yes   Walking or Climbing Stairs ambulation difficulty, requires equipment   Mobility Management walker at baseline   Dressing/Bathing Difficulty yes   Dressing/Bathing bathing difficulty, requires equipment   Dressing/Bathing Management shower chair and grab bars   Toileting issues yes   Toileting Management grab bars   Toileting Assistance toileting difficulty, requires equipment   Doing Errands Independently  "Difficulty (such as shopping) no   Fall history within last six months yes   Number of times patient has fallen within last six months   (\"too many to count\" per pt)   Change in Functional Status Since Onset of Current Illness/Injury yes   General Information   Onset of Illness/Injury or Date of Surgery 08/24/21   Referring Physician Dorothy Beebe DO   Patient/Family Therapy Goals Statement (PT) to return home   Pertinent History of Current Problem (include personal factors and/or comorbidities that impact the POC) Per chart, Katy Islas is a 31 year old female with history of chronic back pain, bipolar disorder, borderline personality disorder and Crohn's Disease admitted for mental health concerns and acute on chronic back pain.   Existing Precautions/Restrictions fall   General Observations Activity orders: up with assist   Cognition   Orientation Status (Cognition) oriented x 3   Affect/Mental Status (Cognition) WFL   Follows Commands (Cognition) WFL   Pain Assessment   Patient Currently in Pain Yes, see Vital Sign flowsheet   Posture    Posture Forward head position;Protracted shoulders   Range of Motion (ROM)   ROM Quick Adds ROM WFL   Strength   Strength Comments Gross LE strength >3/5 per ability to stand independently when standing to adjust hospital gown   Bed Mobility   Comment (Bed Mobility) Ibis with bedrail use   Transfers   Transfer Safety Comments Initially demonstrates sit<>stand independently when adjusting hospital gown though upon sit<>stand when instructed to begin ambulating pt with posterior trunk lean, B knee buckling, and requiring heavy use of FWW for UE support   Gait/Stairs (Locomotion)   Comment (Gait/Stairs) CGA and FWW for very short distance gait; variable step length, intermittent knee buckling (L>R), heavy reliance on FWW with resulting flexed posture, intermittent knee hyperextension   Balance   Balance Comments Varying standing balance depending on task being performed "   Sensory Examination   Sensory Perception Comments Pt reports numbness/tingling to B LEs/feet   Clinical Impression   Criteria for Skilled Therapeutic Intervention yes, treatment indicated   PT Diagnosis (PT) Impaired functional mobility   Influenced by the following impairments pain, numbness/tingling, impaired balance   Functional limitations due to impairments pt requires assist for safe functional mobility   Clinical Presentation Stable/Uncomplicated   Clinical Presentation Rationale PMH and clinical judgment   Clinical Decision Making (Complexity) low complexity   Therapy Frequency (PT) 5x/week   Predicted Duration of Therapy Intervention (days/wks) 1 week   Planned Therapy Interventions (PT) balance training;bed mobility training;gait training;home exercise program;neuromuscular re-education;stair training;strengthening;transfer training;progressive activity/exercise;home program guidelines   Risk & Benefits of therapy have been explained evaluation/treatment results reviewed;care plan/treatment goals reviewed;risks/benefits reviewed;current/potential barriers reviewed;participants voiced agreement with care plan;participants included;patient   PT Discharge Planning    PT Discharge Recommendation (DC Rec) [facility] patient will benefit from intensive, interdisciplinary therapy.  Anticipate will be able to tolerate 3 hours of therapy per day   PT Rationale for DC Rec Pt currently below functional baseline, presenting with functional deficits that limit safety and ability to care for self at home. Pt will benefit from continued intensive rehab to address deficits and facilitate return to [facility].   PT Brief overview of current status  Assist of 1, gait belt, and FWW   Total Evaluation Time   Total Evaluation Time (Minutes) 10

## 2021-08-27 NOTE — PROGRESS NOTES
CHRISTIANNE received a text message from Admissions ([facility]) at   Rehab requesting that pt receive orders for PMR eval for potential conversion disorder.  Chayito indicated that this recommendation was made by therapy's and Dr. Reed (PMR).    CHRISTIANNE spoke with Dayana Castaneda (6A therapist) who states that they had spoke with Dr. Reed this am about pt as Dr. Reed was on 6A , and based on that discussion, it is likely that Dr. Reed contacted  Rehab Admissions and requested that PMR eval orders be obtained.  CHRISTIANNE participated in [facility] rounds and Dr. Cortes confirms that he will order a PMR eval.    DOYLE Beverly  Social Work, 6A  Phone:  [phone]  Pager:  [phone]  8/27/2021

## 2021-08-27 NOTE — PLAN OF CARE
Status: Pt admitted from [place] ED last night. , admitted with concern of N/T in lower extremities, increased bruising to BLE and back pain. To ensure pt has , please call  services 1 hr before present  in done.  here from [phone] 8/27  Vitals: VSS on RA  Neuros: Pt is deaf, talks/uses ASL to communicate. A&Ox4, N/T in BLE, Pt reported pain in lower to mid back 4/5 BLE strength, generalized weakness, unsteady gait  IV: PIV SL  Diet: Reg  Bowel status: LBM unknown, pt estimates 8/23. Audible BS, passing gas  : Voiding without difficulty, UA sent  Pain: HA and back pain managed with PRN IV dilaudid and oxycodone, Request d/t inadequate pain control on current regimen however pt. Resting comfortably t/o shift after dilaudid and oxy-benadryl to be given 15 min beforehand.  Activity: Assist of 1-2, GB walker   Plan: T/L xray done  and spine MRI tomorrow AM. Check list sent. PT/OT/SW/PM&R/PYSCH consults. Considering neurology evaluation.
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
## 2021-08-27 NOTE — PLAN OF CARE
Status: Pt admitted from [place] ED. Extensive health history, admitted with concern of N/T in lower extremities, increased bruising to BLE and back pain. To ensure pt has , please call  services 1 hr before present  in done.  here from [phone] 8/26  Vitals: VSS on RA  Neuros: Pt is deaf, talks/uses ASL to communicate. A&Ox4, N/T in BLE and L hand, pain in eyes when tracking far right and far left. BUE 5/5, BLE 4/5, generalized weakness  IV: PIV SL  Diet: Reg  Bowel status: LBM unknown, pt estimates 8/23. Audible BS  : Voiding without difficulty  Pain: Pt very itchy after IV dilaudid. New orders to given benadryl 15 min before IVdilaudid. Pt reports severe headache and back pain, also using icepacks  Activity: Assist of 1-2, GB walker (ordered)  Plan: MRI tomorrow    Arrived from: [place] ED  Belongings/meds:  Remain with patient   2 RN Skin Assessment Completed by:  Cristina ORTIZ and Jessica LARRY  Non-intact findings documented (yes/no/NA): Yes  - bruising throughout, primarily on BLE and BUE

## 2021-08-28 ENCOUNTER — APPOINTMENT (OUTPATIENT)
Dept: MRI IMAGING | Facility: CLINIC | Age: 31
DRG: 552 | End: 2021-08-28
Attending: STUDENT IN AN ORGANIZED HEALTH CARE EDUCATION/TRAINING PROGRAM
Payer: MEDICARE

## 2021-08-28 LAB
ALBUMIN SERPL-MCNC: 3.4 G/DL (ref 3.4–5)
ALP SERPL-CCNC: 93 U/L (ref 40–150)
ALT SERPL W P-5'-P-CCNC: 31 U/L (ref 0–50)
ANION GAP SERPL CALCULATED.3IONS-SCNC: 3 MMOL/L (ref 3–14)
AST SERPL W P-5'-P-CCNC: 18 U/L (ref 0–45)
BASOPHILS # BLD AUTO: 0 10E3/UL (ref 0–0.2)
BASOPHILS NFR BLD AUTO: 1 %
BILIRUB SERPL-MCNC: 0.2 MG/DL (ref 0.2–1.3)
BUN SERPL-MCNC: 9 MG/DL (ref 7–30)
CALCIUM SERPL-MCNC: 9 MG/DL (ref 8.5–10.1)
CHLORIDE BLD-SCNC: 104 MMOL/L (ref 94–109)
CO2 SERPL-SCNC: 30 MMOL/L (ref 20–32)
CREAT SERPL-MCNC: 0.92 MG/DL (ref 0.52–1.04)
EOSINOPHIL # BLD AUTO: 0.1 10E3/UL (ref 0–0.7)
EOSINOPHIL NFR BLD AUTO: 3 %
ERYTHROCYTE [DISTWIDTH] IN BLOOD BY AUTOMATED COUNT: 12.2 % (ref 10–15)
GFR SERPL CREATININE-BSD FRML MDRD: 83 ML/MIN/1.73M2
GLUCOSE BLD-MCNC: 83 MG/DL (ref 70–99)
HCT VFR BLD AUTO: 38.4 % (ref 35–47)
HGB BLD-MCNC: 13 G/DL (ref 11.7–15.7)
IMM GRANULOCYTES # BLD: 0 10E3/UL
IMM GRANULOCYTES NFR BLD: 0 %
LYMPHOCYTES # BLD AUTO: 1.2 10E3/UL (ref 0.8–5.3)
LYMPHOCYTES NFR BLD AUTO: 32 %
MCH RBC QN AUTO: 33.6 PG (ref 26.5–33)
MCHC RBC AUTO-ENTMCNC: 33.9 G/DL (ref 31.5–36.5)
MCV RBC AUTO: 99 FL (ref 78–100)
MONOCYTES # BLD AUTO: 0.5 10E3/UL (ref 0–1.3)
MONOCYTES NFR BLD AUTO: 13 %
NEUTROPHILS # BLD AUTO: 1.8 10E3/UL (ref 1.6–8.3)
NEUTROPHILS NFR BLD AUTO: 51 %
NRBC # BLD AUTO: 0 10E3/UL
NRBC BLD AUTO-RTO: 0 /100
PLATELET # BLD AUTO: 266 10E3/UL (ref 150–450)
POTASSIUM BLD-SCNC: 4.3 MMOL/L (ref 3.4–5.3)
PROT SERPL-MCNC: 6.7 G/DL (ref 6.8–8.8)
RBC # BLD AUTO: 3.87 10E6/UL (ref 3.8–5.2)
SODIUM SERPL-SCNC: 137 MMOL/L (ref 133–144)
WBC # BLD AUTO: 3.6 10E3/UL (ref 4–11)

## 2021-08-28 PROCEDURE — 255N000002 HC RX 255 OP 636: Performed by: STUDENT IN AN ORGANIZED HEALTH CARE EDUCATION/TRAINING PROGRAM

## 2021-08-28 PROCEDURE — 82565 ASSAY OF CREATININE: CPT | Performed by: STUDENT IN AN ORGANIZED HEALTH CARE EDUCATION/TRAINING PROGRAM

## 2021-08-28 PROCEDURE — 99207 PR CDG-CUT & PASTE-POTENTIAL IMPACT ON LEVEL: CPT | Performed by: STUDENT IN AN ORGANIZED HEALTH CARE EDUCATION/TRAINING PROGRAM

## 2021-08-28 PROCEDURE — 120N000002 HC R&B MED SURG/OB UMMC

## 2021-08-28 PROCEDURE — 85025 COMPLETE CBC W/AUTO DIFF WBC: CPT | Performed by: STUDENT IN AN ORGANIZED HEALTH CARE EDUCATION/TRAINING PROGRAM

## 2021-08-28 PROCEDURE — 72158 MRI LUMBAR SPINE W/O & W/DYE: CPT | Mod: 26 | Performed by: RADIOLOGY

## 2021-08-28 PROCEDURE — G1004 CDSM NDSC: HCPCS | Mod: GC | Performed by: RADIOLOGY

## 2021-08-28 PROCEDURE — 36415 COLL VENOUS BLD VENIPUNCTURE: CPT | Performed by: STUDENT IN AN ORGANIZED HEALTH CARE EDUCATION/TRAINING PROGRAM

## 2021-08-28 PROCEDURE — A9585 GADOBUTROL INJECTION: HCPCS | Performed by: STUDENT IN AN ORGANIZED HEALTH CARE EDUCATION/TRAINING PROGRAM

## 2021-08-28 PROCEDURE — 250N000013 HC RX MED GY IP 250 OP 250 PS 637: Performed by: EMERGENCY MEDICINE

## 2021-08-28 PROCEDURE — 250N000013 HC RX MED GY IP 250 OP 250 PS 637: Performed by: STUDENT IN AN ORGANIZED HEALTH CARE EDUCATION/TRAINING PROGRAM

## 2021-08-28 PROCEDURE — 99233 SBSQ HOSP IP/OBS HIGH 50: CPT | Performed by: STUDENT IN AN ORGANIZED HEALTH CARE EDUCATION/TRAINING PROGRAM

## 2021-08-28 PROCEDURE — 999N000127 HC STATISTIC PERIPHERAL IV START W US GUIDANCE

## 2021-08-28 PROCEDURE — 72158 MRI LUMBAR SPINE W/O & W/DYE: CPT | Mod: ME

## 2021-08-28 PROCEDURE — 250N000011 HC RX IP 250 OP 636: Performed by: STUDENT IN AN ORGANIZED HEALTH CARE EDUCATION/TRAINING PROGRAM

## 2021-08-28 PROCEDURE — 82040 ASSAY OF SERUM ALBUMIN: CPT | Performed by: STUDENT IN AN ORGANIZED HEALTH CARE EDUCATION/TRAINING PROGRAM

## 2021-08-28 RX ORDER — GADOBUTROL 604.72 MG/ML
0.1 INJECTION INTRAVENOUS ONCE
Status: COMPLETED | OUTPATIENT
Start: 2021-08-28 | End: 2021-08-28

## 2021-08-28 RX ORDER — DIAZEPAM 2 MG
2 TABLET ORAL
Status: COMPLETED | OUTPATIENT
Start: 2021-08-28 | End: 2021-08-28

## 2021-08-28 RX ADMIN — OXYCODONE HYDROCHLORIDE 5 MG: 5 TABLET ORAL at 18:22

## 2021-08-28 RX ADMIN — DIPHENHYDRAMINE HYDROCHLORIDE 25 MG: 25 CAPSULE ORAL at 10:45

## 2021-08-28 RX ADMIN — DICYCLOMINE HYDROCHLORIDE 20 MG: 20 TABLET ORAL at 23:28

## 2021-08-28 RX ADMIN — HYDROMORPHONE HYDROCHLORIDE 0.5 MG: 1 INJECTION, SOLUTION INTRAMUSCULAR; INTRAVENOUS; SUBCUTANEOUS at 19:41

## 2021-08-28 RX ADMIN — DIPHENHYDRAMINE HYDROCHLORIDE 25 MG: 25 CAPSULE ORAL at 18:22

## 2021-08-28 RX ADMIN — PANTOPRAZOLE SODIUM 40 MG: 40 TABLET, DELAYED RELEASE ORAL at 08:46

## 2021-08-28 RX ADMIN — OXYCODONE HYDROCHLORIDE 5 MG: 5 TABLET ORAL at 10:45

## 2021-08-28 RX ADMIN — OXYCODONE HYDROCHLORIDE 5 MG: 5 TABLET ORAL at 22:32

## 2021-08-28 RX ADMIN — OXYCODONE HYDROCHLORIDE 5 MG: 5 TABLET ORAL at 01:14

## 2021-08-28 RX ADMIN — OXYCODONE HYDROCHLORIDE 5 MG: 5 TABLET ORAL at 05:43

## 2021-08-28 RX ADMIN — GADOBUTROL 8.4 ML: 604.72 INJECTION INTRAVENOUS at 12:07

## 2021-08-28 RX ADMIN — DICYCLOMINE HYDROCHLORIDE 20 MG: 20 TABLET ORAL at 11:50

## 2021-08-28 RX ADMIN — HYDROMORPHONE HYDROCHLORIDE 0.5 MG: 1 INJECTION, SOLUTION INTRAMUSCULAR; INTRAVENOUS; SUBCUTANEOUS at 13:19

## 2021-08-28 RX ADMIN — ENOXAPARIN SODIUM 40 MG: 40 INJECTION SUBCUTANEOUS at 23:28

## 2021-08-28 RX ADMIN — DICYCLOMINE HYDROCHLORIDE 20 MG: 20 TABLET ORAL at 05:43

## 2021-08-28 RX ADMIN — GABAPENTIN 300 MG: 300 CAPSULE ORAL at 13:19

## 2021-08-28 RX ADMIN — HYDROMORPHONE HYDROCHLORIDE 0.5 MG: 1 INJECTION, SOLUTION INTRAMUSCULAR; INTRAVENOUS; SUBCUTANEOUS at 23:28

## 2021-08-28 RX ADMIN — DIPHENHYDRAMINE HYDROCHLORIDE 25 MG: 25 CAPSULE ORAL at 00:13

## 2021-08-28 RX ADMIN — ARIPIPRAZOLE 30 MG: 30 TABLET ORAL at 23:28

## 2021-08-28 RX ADMIN — DULOXETINE HYDROCHLORIDE 60 MG: 30 CAPSULE, DELAYED RELEASE ORAL at 08:46

## 2021-08-28 RX ADMIN — HYDROMORPHONE HYDROCHLORIDE 0.5 MG: 1 INJECTION, SOLUTION INTRAMUSCULAR; INTRAVENOUS; SUBCUTANEOUS at 03:15

## 2021-08-28 RX ADMIN — DIPHENHYDRAMINE HYDROCHLORIDE 25 MG: 25 CAPSULE ORAL at 05:19

## 2021-08-28 RX ADMIN — DIAZEPAM 2 MG: 2 TABLET ORAL at 11:50

## 2021-08-28 RX ADMIN — PANTOPRAZOLE SODIUM 40 MG: 40 TABLET, DELAYED RELEASE ORAL at 19:32

## 2021-08-28 RX ADMIN — DIPHENHYDRAMINE HYDROCHLORIDE 25 MG: 25 CAPSULE ORAL at 22:17

## 2021-08-28 RX ADMIN — GABAPENTIN 300 MG: 300 CAPSULE ORAL at 19:32

## 2021-08-28 RX ADMIN — GABAPENTIN 300 MG: 300 CAPSULE ORAL at 08:46

## 2021-08-28 RX ADMIN — DICYCLOMINE HYDROCHLORIDE 20 MG: 20 TABLET ORAL at 18:17

## 2021-08-28 RX ADMIN — BUPROPION HYDROCHLORIDE 300 MG: 150 TABLET, FILM COATED, EXTENDED RELEASE ORAL at 08:46

## 2021-08-28 RX ADMIN — DIPHENHYDRAMINE HYDROCHLORIDE 25 MG: 25 CAPSULE ORAL at 23:36

## 2021-08-28 ASSESSMENT — VISUAL ACUITY
OU: BASELINE;GLASSES

## 2021-08-28 ASSESSMENT — ACTIVITIES OF DAILY LIVING (ADL)
ADLS_ACUITY_SCORE: 23
ADLS_ACUITY_SCORE: 22
ADLS_ACUITY_SCORE: 22
ADLS_ACUITY_SCORE: 23
ADLS_ACUITY_SCORE: 23
ADLS_ACUITY_SCORE: 22

## 2021-08-28 NOTE — PROGRESS NOTES
[facility]    Medicine Progress Note - Hospitalist Service, Gold 11       Date of Admission:  8/24/2021    Assessment & Plan                   Katy Islas is a 31 year old female with history of chronic back pain, bipolar disorder, borderline personality disorder and Crohn's Disease admitted for mental health concerns and acute on chronic back pain.    Today's plan   -Spoke about recommendations from OT/PT and patient refused skilled placement mentions wants to take care of cats   -Awaiting formal consult with pain service   -Planned for lumbar MRI today     IV drips and antimicrobials      Plan     Acute Pain in lower back 8/10 with some radiation down rgt leg, muscle spasm mentioned 8/27   Patient does mentions some worsening symptoms of her pain, No signs of saddle area anasthesia, bowel or bladder incontinence concerning for cord compression, Pain does not related to pancreatitis, urine infection or intraabdominal pathology     Prn dilaudid  For acute nociceptive pain   Prn methocarbamol for muscle spasm    prn gabapentin Neuropathic pain       Chronic Lower back pain rated 8/10   Patient is  on hydrocodone-acetaminophen outpt last  fill 6/7/2021,8/5 clonazepam,gabapentin 7/29, Had ct abdomen&chest  8/24 remkble for renal stones , urine 8/24 - , lipase 5/26 - ,  Lumbar MRI 12/2016 disc disease     Depression PTA Duloxetine and bupropion    Agitation PRN Aripiprazole   Nicotine addiction PRN nicotine patch   Gerd PTA famotidine   Abdominal spasms dicyclomine   H/o crohns        Diet: Combination Diet Regular Diet Adult    DVT Prophylaxis: Enoxaparin (Lovenox) SQ  Carrion Catheter: Not present  Central Lines: None  Code Status: Full Code      Disposition Plan   Expected discharge: 08/30/2021   recommended to prior living arrangement once adequate pain management/ tolerating PO medications and safe disposition plan/ TCU bed available.     The patient's care was  discussed with the Bedside Nurse and Patient.    Crescencio Cortes MD  Hospitalist Service, [address]  Securely message with the [employer] Web Console (learn more here)  Text page via [employer] Paging/Directory  Please see sign in/sign out for up to date coverage information    Clinically Significant Risk Factors Present on Admission               ______________________________________________________________________    Interval History   Patient has no new symptoms, Reports not sleeping well, She does not endorse any abdominal pain, difficulty urinating or malaise.     Data reviewed today: I reviewed all medications, new labs and imaging results over the last 24 hours. I personally reviewed no images or EKG's today.    Physical Exam   Vital Signs: Temp: 97.6  F (36.4  C) Temp src: Oral BP: 126/85 Pulse: 78   Resp: 16 SpO2: 92 % O2 Device: None (Room air)    Weight: 0 lbs 0 oz  Constitutional: awake, alert, cooperative, no apparent distress, and appears stated age  Eyes: Lids and lashes normal, pupils equal, round and reactive to light, extra ocular muscles intact, sclera clear, conjunctiva normal  ENT: Normocephalic, without obvious abnormality, atraumatic, sinuses nontender on palpation, external ears without lesions, oral pharynx with moist mucous membranes, tonsils without erythema or exudates, gums normal and good dentition.  Hematologic / Lymphatic: no cervical lymphadenopathy  Respiratory: No increased work of breathing, good air exchange, clear to auscultation bilaterally, no crackles or wheezing  Cardiovascular: Normal apical impulse, regular rate and rhythm, normal S1 and S2, no S3 or S4, and no murmur noted  GI: No scars, normal bowel sounds, soft, non-distended, non-tender, no masses palpated, no hepatosplenomegally  Genitounirinary:   Skin: no bruising or bleeding  Musculoskeletal: There is no redness, warmth, or swelling of the joints.  Full range of motion  noted.  Motor strength is 5 out of 5 all extremities bilaterally.  Tone is normal.  Neurologic: Awake, alert, oriented to name, place and time.  Cranial nerves II-XII are grossly intact.  Motor is 5 out of 5 bilaterally.  Cerebellar finger to nose, heel to shin intact.  Sensory is intact.  Babinski down going, Romberg negative, and gait is normal.  Neuropsychiatric: General: normal, calm and normal eye contact    Data   Recent Labs   Lab 08/27/21  0639 08/24/21  1905 08/24/21  1816 08/23/21  1457   WBC 4.8  --  7.2 5.6   HGB 12.1  --  14.4 13.9   MCV 96  --  96 99     --  440 352   INR  --  1.01  --   --      --  136  --    POTASSIUM 3.9  --  4.0  --    CHLORIDE 105  --  104  --    CO2 27  --  29  --    BUN 11  --  16  --    CR 0.88  --  1.05*  --    ANIONGAP 4  --  3  --    SARANYA 9.3  --  9.4  --    GLC 88  --  80  --    ALBUMIN  --   --  4.5  --    PROTTOTAL  --   --  8.4  --    BILITOTAL  --   --  0.4  --    ALKPHOS  --   --  120  --    ALT  --   --  29  --    AST  --   --  18  --    LIPASE 40*  --   --   --    TROPONIN  --   --  <0.015  --      No results found for this or any previous visit (from the past 24 hour(s)).  Medications       ARIPiprazole  30 mg Oral At Bedtime     buPROPion  300 mg Oral QAM     dicyclomine  20 mg Oral Q6H     DULoxetine  60 mg Oral Daily     enoxaparin ANTICOAGULANT  40 mg Subcutaneous Q24H     famotidine  20 mg Intravenous Once     gabapentin  300 mg Oral TID     nicotine   Transdermal Q8H     pantoprazole  40 mg Oral BID     sodium chloride (PF)  3 mL Intracatheter Q8H

## 2021-08-28 NOTE — PLAN OF CARE
Status: Pt admitted with concern of N/T in lower extremities and increased back pain  Vitals: VSS on RA  Neuros: Pt deaf and uses an . AO4, strength 4/5 throughout. Reports numbness/tingling from back down to toes intermittently.   IV: PIV SL  Resp/trach: LS clear throughout  Diet: Regular diet, fair intake overnight. Reported stomach pains but declined tylenol or an antiemetic   Bowel status: Reports last BM 8/23  : Voiding spontaneously  Skin: Intact  Pain: Controlled with PRN Oxycodone and IV Dilaudid. Pt sleeping in between cares. Refused to take Tylenol and Robaxin when offered  Activity: Up with assist of 1, GB and walker. Ambulated halls x 1  Plan: PT/OT/SW consults. Spine MRI today. Continue to monitor and follow POC

## 2021-08-28 NOTE — PLAN OF CARE
Status: Pt admitted from [place] ED 8/26. , admitted with concern of N/T in lower extremities, increased bruising to BLE and back pain. To ensure pt has ,  Vitals: VSS on RA  Neuros: Pt is deaf, talks/uses ASL to communicate. A&Ox4, N/T in BLE, Pt reported pain in lower to mid back 4/5 BLE strength, generalized weakness, unsteady gait  IV: PIV SL  Diet: Reg  Bowel status: LBM unknown, pt estimates 8/23. Audible BS, passing gas, Pt c/o bloating  : Voiding without difficulty in BR  Pain: HA and back pain managed with PRN IV dilaudid and oxycodone. Resting comfortably t/o shift after dilaudid and oxy-benadryl to be given 15 min beforehand.  Activity: Assist of 1, GB walker   Plan: spine MRI done this shift.  PT/OT/SW/PM&R consults.

## 2021-08-29 ENCOUNTER — APPOINTMENT (OUTPATIENT)
Dept: PHYSICAL THERAPY | Facility: CLINIC | Age: 31
DRG: 552 | End: 2021-08-29
Payer: MEDICARE

## 2021-08-29 ENCOUNTER — NURSE TRIAGE (OUTPATIENT)
Dept: NURSING | Facility: CLINIC | Age: 31
End: 2021-08-29

## 2021-08-29 VITALS
HEART RATE: 86 BPM | DIASTOLIC BLOOD PRESSURE: 77 MMHG | RESPIRATION RATE: 16 BRPM | TEMPERATURE: 97.2 F | OXYGEN SATURATION: 95 % | SYSTOLIC BLOOD PRESSURE: 135 MMHG

## 2021-08-29 LAB
ALBUMIN SERPL-MCNC: 3.2 G/DL (ref 3.4–5)
ALP SERPL-CCNC: 87 U/L (ref 40–150)
ALT SERPL W P-5'-P-CCNC: 28 U/L (ref 0–50)
ANION GAP SERPL CALCULATED.3IONS-SCNC: 4 MMOL/L (ref 3–14)
AST SERPL W P-5'-P-CCNC: 16 U/L (ref 0–45)
BACTERIA BLD CULT: NO GROWTH
BACTERIA BLD CULT: NO GROWTH
BASOPHILS # BLD AUTO: 0 10E3/UL (ref 0–0.2)
BASOPHILS NFR BLD AUTO: 1 %
BILIRUB SERPL-MCNC: 0.5 MG/DL (ref 0.2–1.3)
BUN SERPL-MCNC: 10 MG/DL (ref 7–30)
CALCIUM SERPL-MCNC: 9 MG/DL (ref 8.5–10.1)
CHLORIDE BLD-SCNC: 106 MMOL/L (ref 94–109)
CO2 SERPL-SCNC: 29 MMOL/L (ref 20–32)
CREAT SERPL-MCNC: 0.86 MG/DL (ref 0.52–1.04)
EOSINOPHIL # BLD AUTO: 0.2 10E3/UL (ref 0–0.7)
EOSINOPHIL NFR BLD AUTO: 4 %
ERYTHROCYTE [DISTWIDTH] IN BLOOD BY AUTOMATED COUNT: 12.2 % (ref 10–15)
GFR SERPL CREATININE-BSD FRML MDRD: 90 ML/MIN/1.73M2
GLUCOSE BLD-MCNC: 84 MG/DL (ref 70–99)
HCT VFR BLD AUTO: 36.3 % (ref 35–47)
HGB BLD-MCNC: 13 G/DL (ref 11.7–15.7)
IMM GRANULOCYTES # BLD: 0 10E3/UL
IMM GRANULOCYTES NFR BLD: 1 %
LYMPHOCYTES # BLD AUTO: 1.7 10E3/UL (ref 0.8–5.3)
LYMPHOCYTES NFR BLD AUTO: 45 %
MCH RBC QN AUTO: 34.2 PG (ref 26.5–33)
MCHC RBC AUTO-ENTMCNC: 35.8 G/DL (ref 31.5–36.5)
MCV RBC AUTO: 96 FL (ref 78–100)
MONOCYTES # BLD AUTO: 0.4 10E3/UL (ref 0–1.3)
MONOCYTES NFR BLD AUTO: 12 %
NEUTROPHILS # BLD AUTO: 1.4 10E3/UL (ref 1.6–8.3)
NEUTROPHILS NFR BLD AUTO: 37 %
NRBC # BLD AUTO: 0 10E3/UL
NRBC BLD AUTO-RTO: 0 /100
PLATELET # BLD AUTO: 242 10E3/UL (ref 150–450)
POTASSIUM BLD-SCNC: 4.1 MMOL/L (ref 3.4–5.3)
PROT SERPL-MCNC: 6.4 G/DL (ref 6.8–8.8)
RBC # BLD AUTO: 3.8 10E6/UL (ref 3.8–5.2)
SODIUM SERPL-SCNC: 139 MMOL/L (ref 133–144)
WBC # BLD AUTO: 3.6 10E3/UL (ref 4–11)

## 2021-08-29 PROCEDURE — 97116 GAIT TRAINING THERAPY: CPT | Mod: GP

## 2021-08-29 PROCEDURE — 250N000013 HC RX MED GY IP 250 OP 250 PS 637: Performed by: STUDENT IN AN ORGANIZED HEALTH CARE EDUCATION/TRAINING PROGRAM

## 2021-08-29 PROCEDURE — 250N000011 HC RX IP 250 OP 636: Performed by: HOSPITALIST

## 2021-08-29 PROCEDURE — 250N000011 HC RX IP 250 OP 636: Performed by: STUDENT IN AN ORGANIZED HEALTH CARE EDUCATION/TRAINING PROGRAM

## 2021-08-29 PROCEDURE — 97530 THERAPEUTIC ACTIVITIES: CPT | Mod: GP

## 2021-08-29 PROCEDURE — 36415 COLL VENOUS BLD VENIPUNCTURE: CPT | Performed by: STUDENT IN AN ORGANIZED HEALTH CARE EDUCATION/TRAINING PROGRAM

## 2021-08-29 PROCEDURE — 85025 COMPLETE CBC W/AUTO DIFF WBC: CPT | Performed by: STUDENT IN AN ORGANIZED HEALTH CARE EDUCATION/TRAINING PROGRAM

## 2021-08-29 PROCEDURE — 99239 HOSP IP/OBS DSCHRG MGMT >30: CPT | Performed by: STUDENT IN AN ORGANIZED HEALTH CARE EDUCATION/TRAINING PROGRAM

## 2021-08-29 PROCEDURE — 250N000013 HC RX MED GY IP 250 OP 250 PS 637: Performed by: EMERGENCY MEDICINE

## 2021-08-29 PROCEDURE — 80053 COMPREHEN METABOLIC PANEL: CPT | Performed by: STUDENT IN AN ORGANIZED HEALTH CARE EDUCATION/TRAINING PROGRAM

## 2021-08-29 RX ORDER — DIPHENHYDRAMINE HCL 25 MG
25 CAPSULE ORAL EVERY 4 HOURS PRN
Status: DISCONTINUED | OUTPATIENT
Start: 2021-08-29 | End: 2021-08-29 | Stop reason: HOSPADM

## 2021-08-29 RX ORDER — DIPHENHYDRAMINE HYDROCHLORIDE 50 MG/ML
25 INJECTION INTRAMUSCULAR; INTRAVENOUS ONCE
Status: COMPLETED | OUTPATIENT
Start: 2021-08-29 | End: 2021-08-29

## 2021-08-29 RX ORDER — OXYCODONE AND ACETAMINOPHEN 5; 325 MG/1; MG/1
1-2 TABLET ORAL EVERY 4 HOURS PRN
Qty: 7 TABLET | Refills: 0 | Status: SHIPPED | OUTPATIENT
Start: 2021-08-29 | End: 2021-08-31

## 2021-08-29 RX ORDER — OXYCODONE AND ACETAMINOPHEN 5; 325 MG/1; MG/1
1-2 TABLET ORAL EVERY 4 HOURS PRN
Status: DISCONTINUED | OUTPATIENT
Start: 2021-08-29 | End: 2021-08-29 | Stop reason: HOSPADM

## 2021-08-29 RX ORDER — OXYCODONE AND ACETAMINOPHEN 5; 325 MG/1; MG/1
1-2 TABLET ORAL EVERY 4 HOURS PRN
Status: DISCONTINUED | OUTPATIENT
Start: 2021-08-29 | End: 2021-08-29

## 2021-08-29 RX ADMIN — GABAPENTIN 300 MG: 300 CAPSULE ORAL at 07:59

## 2021-08-29 RX ADMIN — BUPROPION HYDROCHLORIDE 300 MG: 150 TABLET, FILM COATED, EXTENDED RELEASE ORAL at 07:59

## 2021-08-29 RX ADMIN — DIPHENHYDRAMINE HYDROCHLORIDE 25 MG: 25 CAPSULE ORAL at 07:59

## 2021-08-29 RX ADMIN — ACETAMINOPHEN 975 MG: 325 TABLET, FILM COATED ORAL at 00:26

## 2021-08-29 RX ADMIN — PANTOPRAZOLE SODIUM 40 MG: 40 TABLET, DELAYED RELEASE ORAL at 07:59

## 2021-08-29 RX ADMIN — DIPHENHYDRAMINE HYDROCHLORIDE 25 MG: 50 INJECTION, SOLUTION INTRAMUSCULAR; INTRAVENOUS at 01:32

## 2021-08-29 RX ADMIN — METHOCARBAMOL 500 MG: 500 TABLET ORAL at 00:26

## 2021-08-29 RX ADMIN — DICYCLOMINE HYDROCHLORIDE 20 MG: 20 TABLET ORAL at 11:38

## 2021-08-29 RX ADMIN — OXYCODONE HYDROCHLORIDE AND ACETAMINOPHEN 2 TABLET: 5; 325 TABLET ORAL at 11:38

## 2021-08-29 RX ADMIN — DICYCLOMINE HYDROCHLORIDE 20 MG: 20 TABLET ORAL at 06:30

## 2021-08-29 RX ADMIN — DULOXETINE HYDROCHLORIDE 60 MG: 30 CAPSULE, DELAYED RELEASE ORAL at 07:58

## 2021-08-29 RX ADMIN — HYDROMORPHONE HYDROCHLORIDE 0.5 MG: 1 INJECTION, SOLUTION INTRAMUSCULAR; INTRAVENOUS; SUBCUTANEOUS at 07:58

## 2021-08-29 RX ADMIN — OXYCODONE HYDROCHLORIDE 5 MG: 5 TABLET ORAL at 06:30

## 2021-08-29 ASSESSMENT — ACTIVITIES OF DAILY LIVING (ADL)
ADLS_ACUITY_SCORE: 22
ADLS_ACUITY_SCORE: 22
ADLS_ACUITY_SCORE: 23
ADLS_ACUITY_SCORE: 22

## 2021-08-29 ASSESSMENT — VISUAL ACUITY
OU: NORMAL ACUITY
OU: NORMAL ACUITY;GLASSES

## 2021-08-29 NOTE — DISCHARGE SUMMARY
[facility]  Hospitalist Discharge Summary      Date of Admission:  8/24/2021  Date of Discharge:  8/29/2021  3:01 PM  Discharging Provider: Crescencio Cortes MD  Discharge Team: Hospitalist Service, Gold     Discharge Diagnoses   Acute on chronic  back pain d/2 disc disease       Follow-ups Needed After Discharge   Follow-up Appointments     Adult [facility] Follow-up and recommended labs and tests      Follow up with primary care provider, Aura Rosario,   within 30 , to evaluate medication change and for hospital follow- up. No   follow up labs or test are needed.    Pain clinic within a month      Appointments on [place] and/or [facility] (with [facility] or [facility]   provider or service). Call [phone] if you haven't heard regarding   these appointments within 7 days of discharge.         {Additional follow-up instructions/to-do's for PCP    :F/up with pain service     Unresulted Labs Ordered in the Past 30 Days of this Admission     Date and Time Order Name Status Description    8/24/2021  7:20 PM Blood Culture Peripheral Blood Preliminary     8/24/2021  7:20 PM Blood Culture Peripheral Blood Preliminary       These results will be followed up by Primary care     Discharge Disposition   Discharged to home  Condition at discharge: Stable      Hospital Course                      Katy Islas is a 31 year old female has a pmh of crohns disease, depression,gerd,nicotine addiction,abdominal cramps presented with acute on chronic back.     Her admission workup was significant for known Chronic spinal disease with well positioned hardware in the lumbar and thoracic spine along with a spinal stimulator seen on MRI of the lumbar spine 8/28. Patient recently had a ct abdomen, 8/24 that was unremarkable, lipase and urinalysis were also normal.     Her pain responded well to conservative measures with therapy and better pain management. Per ot/pt request  patient was considered a rehab candidate. On PM & R was consulted on 8/27 and deemed the patient a better candidate for skilled placement for rehab. These options were presented to the patient and she refused skilled placement options and rather have outpatient care under a pain specialist with services come to her home as she has cats to take care of.     Today the patient is been discharged in stable condition.         Consultations This Hospital Stay   MEDICATION HISTORY IP PHARMACY CONSULT  PHYSICAL THERAPY ADULT IP CONSULT  OCCUPATIONAL THERAPY ADULT IP CONSULT  SOCIAL WORK IP CONSULT  PSYCHOLOGY ADULT IP CONSULT  PHYSICAL MEDICINE & REHAB ASSESSMENT FOR REHAB PLACEMENT ADULT IP CONSULT  PAIN MANAGEMENT ADULT IP CONSULT  PHYSICAL MEDICINE & REHAB ASSESSMENT FOR REHAB PLACEMENT ADULT IP CONSULT  VASCULAR ACCESS CARE ADULT IP CONSULT  CARE MANAGEMENT / SOCIAL WORK IP CONSULT    Code Status   Full Code    Time Spent on this Encounter   I, Crescencio Cortes MD, personally saw the patient today and spent greater than 30 minutes discharging this patient.       Crescencio Cortes MD  [facility] [address] [address]  Phone: [phone]  ______________________________________________________________________    Physical Exam   Vital Signs: Temp: 97.2  F (36.2  C) Temp src: Oral BP: 135/77 Pulse: 86   Resp: 16 SpO2: 95 % O2 Device: None (Room air)    Weight: 0 lbs 0 oz  Constitutional: awake, alert, cooperative, no apparent distress, and appears stated age  Eyes: Lids and lashes normal, pupils equal, round and reactive to light, extra ocular muscles intact, sclera clear, conjunctiva normal  ENT: Normocephalic, without obvious abnormality, atraumatic, sinuses nontender on palpation, external ears without lesions, oral pharynx with moist mucous membranes, tonsils without erythema or exudates, gums normal and good dentition.  Hematologic / Lymphatic: no cervical  lymphadenopathy  Respiratory: No increased work of breathing, good air exchange, clear to auscultation bilaterally, no crackles or wheezing  Cardiovascular: Normal apical impulse, regular rate and rhythm, normal S1 and S2, no S3 or S4, and no murmur noted  GI: No scars, normal bowel sounds, soft, non-distended, non-tender, no masses palpated, no hepatosplenomegally  Genitounirinary:   Skin: no bruising or bleeding  Musculoskeletal: There is no redness, warmth, or swelling of the joints.  Full range of motion noted.  Motor strength is 5 out of 5 all extremities bilaterally.  Tone is normal.  Neurologic: Awake, alert, oriented to name, place and time.  Cranial nerves II-XII are grossly intact.  Motor is 5 out of 5 bilaterally.  Cerebellar finger to nose, heel to shin intact.  Sensory is intact.  Babinski down going, Romberg negative, and gait is normal.  Neuropsychiatric: General: normal, calm and normal eye contact       Primary Care Physician   Aura Rosario    Discharge Orders      Medication Therapy Management Referral      Home Care PT Referral for Hospital Discharge      Home Care OT Referral for Hospital Discharge      Activity    Your activity upon discharge: activity as tolerated     Adult [facility] Follow-up and recommended labs and tests    Follow up with primary care provider, Aura Rosario, within 30 , to evaluate medication change and for hospital follow- up. No follow up labs or test are needed.    Pain clinic within a month      Appointments on [place] and/or [facility] (with [facility] or [facility] provider or service). Call [phone] if you haven't heard regarding these appointments within 7 days of discharge.     Reason for your hospital stay    Dear Katy Islas,    Your were hospitalized at [facility] with back pain  and treated with short course of pain killers.  Over your hospitalization your condition improved and today you are  ready to be discharged.  You should continue to improve but if you develop fever, shortness of breath, nausea, vomiting or abdominal pain please seek medical attention.    We are suggesting the following medication changes:  Take short course of Oxycodone-tylenol 10 mg-325 mg as needed every 4 hours for your back pain and then stop   Please get the following tests done:  NA    Please set up an appointment with:  Primary care within a month   Pain service within a month     It was a pleasure meeting with you today. Thank you for allowing me and my team the privilege of caring for you during your hospitalization. You are the reason we are here, and I truly hope we provided you with the excellent service you deserve. Please let us know if there is anything else we can do for you so that we can be sure you are leaving completely satisfied with your care experience.    Your hospital unit at the time of discharge is 6A so if you have any questions please call the hospital at [phone] and ask to talk to a nurse on 6A.     Sincerely,    Crescencio Cortes MD  Internal Medicine Hospitalist  [facility]     MD face to face encounter    Documentation of Face to Face and Certification for Home Health Services    I certify that patient: Katy Islas is under my care and that I, or a nurse practitioner or physician's assistant working with me, had a face-to-face encounter that meets the physician face-to-face encounter requirements with this patient on: 8/29/2021.    This encounter with the patient was in whole, or in part, for the following medical condition, which is the primary reason for home health care: paresthesias.    I certify that, based on my findings, the following services are medically necessary home health services: Occupational Therapy and Physical Therapy.    My clinical findings support the need for the above services because: Occupational Therapy Services are needed to assess and treat cognitive  ability and address ADL safety worsening back pain, paresthesias. and Physical Therapy Services are needed to assess and treat the following functional impairments: worsening acute back pain, paresthesias.    Further, I certify that my clinical findings support that this patient is homebound (i.e. absences from home require considerable and taxing effort and are for medical reasons or [employer] services or infrequently or of short duration when for other reasons) because: Requires assistance of another person or specialized equipment to access medical services because patient: Has prohibitive pain during ambulation...    Based on the above findings. I certify that this patient is confined to the home and needs intermittent skilled nursing care, physical therapy and/or speech therapy.  The patient is under my care, and I have initiated the establishment of the plan of care.  This patient will be followed by a physician who will periodically review the plan of care.  Physician/Provider to provide follow up care: Aura Rosario    Attending hospital physician (the Medicare certified [place] provider): Crescencio Cortes MD  Physician Signature: See electronic signature associated with these discharge orders.  Date: 8/29/2021     Diet    Follow this diet upon discharge: Orders Placed This Encounter      Combination Diet Regular Diet Adult       Significant Results and Procedures   Most Recent 3 CBC's:Recent Labs   Lab Test 08/29/21  0733 08/28/21  1352 08/27/21  0639   WBC 3.6* 3.6* 4.8   HGB 13.0 13.0 12.1   MCV 96 99 96    266 271     Most Recent 3 BMP's:Recent Labs   Lab Test 08/29/21  0733 08/28/21  1352 08/27/21  0639    137 136   POTASSIUM 4.1 4.3 3.9   CHLORIDE 106 104 105   CO2 29 30 27   BUN 10 9 11   CR 0.86 0.92 0.88   ANIONGAP 4 3 4   SARANYA 9.0 9.0 9.3   GLC 84 83 88     Most Recent 2 LFT's:Recent Labs   Lab Test 08/29/21  0733 08/28/21  1352   AST 16 18   ALT 28 31   ALKPHOS 87 93    BILITOTAL 0.5 0.2   ,   Results for orders placed or performed during the hospital encounter of 08/24/21   Head CT w/o contrast    Narrative    CT SCAN OF THE HEAD WITHOUT CONTRAST   8/24/2021 8:52 PM     HISTORY: Dizziness, non-specific.    TECHNIQUE:  Axial images of the head and coronal reformations without  IV contrast material. Radiation dose for this scan was reduced using  automated exposure control, adjustment of the mA and/or kV according  to patient size, or iterative reconstruction technique.    COMPARISON: CT head 11/2/2011.    FINDINGS: There is no evidence of intracranial hemorrhage, mass, acute  infarct or anomaly. The ventricles are normal in size, shape and  configuration. The brain parenchyma and subarachnoid spaces are  normal.     Small polypoid soft tissue density lesion in the right nasal cavity,  positioned along the right aspect of the nasal septum which may  possibly be attached to the anterior inferior aspect of the right  middle turbinate (series 6 image 7) measuring approximately 9 mm. This  appears to have been present to some degree on previous exams and is  nonspecific. This could potentially represent a nasal polyp but is  incompletely characterized. Otherwise, the visualized paranasal  sinuses and nasal cavity are unremarkable. The visualized aspects of  the mastoid and middle ear cavities are clear. The bony calvarium and  bones of the skull base appear intact.     On the  images, partially visualized postsurgical changes in the  lower lumbar region as well as spinal cord stimulator, incompletely  evaluated.      Impression    IMPRESSION:  1. No CT findings of acute intracranial process.  2. Small nonspecific polypoid soft tissue lesion in the right nasal  cavity, as described. Recommend correlation with direct inspection.    MACHO BLANCHARD MD         SYSTEM ID:  [id number]   CT Chest Abdomen Pelvis w/o Contrast    Narrative    CT CHEST ABDOMEN PELVIS W/O CONTRAST 8/24/2021  8:52 PM    CLINICAL HISTORY: Chest pain or back pain, aortic dissection  suspected, history of hypertension and Crohn's disease    TECHNIQUE: CT scan of the chest, abdomen, and pelvis was performed  without IV contrast. Multiplanar reformats were obtained. Dose  reduction techniques were used. Patient allergic to contrast.  CONTRAST: None.    COMPARISON: CT 5/5/2021    FINDINGS: Limited exam due to patient motion.    VASCULAR FINDINGS: Precontrast imaging shows no evidence of intramural  hematoma. Normal caliber thoracic and abdominal aorta. This exam is  nondiagnostic for aortic dissection.    LUNGS AND PLEURA: The central airways are clear. Mosaic lung  attenuation. Mild left basilar and lingular consolidation. No pleural  effusion.    MEDIASTINUM/AXILLAE: No thoracic adenopathy. Upper normal heart size.  No pericardial effusion.    HEPATOBILIARY: Postcholecystectomy. No biliary ductal dilatation.  Grossly unremarkable unenhanced liver.    PANCREAS: Normal.    SPLEEN: Normal.    ADRENAL GLANDS: Normal.    KIDNEYS/BLADDER: Small hyperdense lower right renal cortical region  consistent with hemorrhagic cysts. Punctate nonobstructing left renal  calcifications. No hydronephrosis, hydroureter or obstructing ureteral  stone. Unremarkable urinary bladder.     BOWEL: Normal caliber small bowel. The appendix is not clearly  visualized. Normal caliber colon. No free air or free fluid.    LYMPH NODES: Normal.    VASCULATURE: Please see above.    PELVIC ORGANS: Normal.    OTHER: Thoracic spinal stimulator device.    MUSCULOSKELETAL: Left SI joint fusion. L4-5 fusion.      Impression    IMPRESSION:  1.  No acute findings. No evidence of a thoracic or abdominal aortic  aneurysm or intramural hematoma. Note that contrast was not  administered due to patient allergy. This precludes the evaluation for  an aortic dissection or pulmonary artery embolism.  2.  Punctate nonobstructing left renal calculi.    THOM SCHERER MD          SYSTEM ID:  [id number]   XR Thoracic Lumbar Spine 2 Views    Narrative    2 views lumbar spine radiographs 8/27/2021 10:28 AM    History: med spinal tenderness lower thoracic spine    Additional History from EMR:  Acute Pain in lower back 8/10 with some  radiation down right leg, muscle spasm mentioned 8/27. Patient does  mentions some worsening symptoms of her pain, No signs of saddle area  anesthesia, bowel or bladder incontinence concerning for cord  compression.    Comparison: CT CAP 8/24/2021    Findings:    Standing  2 views of the lumbar spine were obtained.    5  lumbar type vertebral bodies are assumed for the purpose of this  dictation.    There is no acute osseous abnormality.  Surgical fusion hardware at  L4-L5 without radiographic evidence of hardware failure. Spinal  stimulator device in place with leads terminating at approximately  T7-8, similar to prior. Left sacroiliac screws in place.    The visualized bowel gas pattern is non-obstructive.    Status post cholecystectomy.      Impression    Impression:  1.  No acute osseous abnormality.  2.  Lumbar and sacral hardware in similar position to prior. Spinal  leads terminating at approximately T7/T8, similar to prior.    SABINE ALONSO MD         SYSTEM ID:  [id number]   MR Lumbar Spine w/o & w Contrast    Narrative    MR LUMBAR SPINE W/O & W CONTRAST 8/28/2021 12:49 PM    Provided History: Low back pain, > 6 wks  Comparison: Lumbar MR dated 12/1/2016.    Technique: Sagittal T1-weighted, sagittal STIR, 3D volumetric axial  and sagittal reconstructed T2-weighted images of the lumbar spine were  obtained without intravenous contrast.     Findings: There are 5 lumbar-type vertebrae assumed for the purposes  of this dictation.  Postsurgical changes of L4-5 instrumented fusion  with transpedicular screws, interconnecting fusion rods and interbody  fusion device. Spinal cord stimulator within the posterior soft  tissues at the level of L1-L2.  Left  SI fusion. Postcontrast  enhancement of the posterior soft tissues surrounding the spinal cord  stimulator and screws at L4-5 are seen likely representing  postprocedural changes. Low ADC and diffusion signal at the levels of  L4-5 likely corresponding to metallic artifact. The tip of the conus  medullaris is at L1.  Straightening of the normal lumbar lordosis.   There is no significant disc height narrowing .  Normal marrow signal.    On a level by level basis:    T12-L1: No spinal canal or neuroforaminal stenosis.    L1-2: Disc bulge, asymmetric to the right. Left ligamentum flavum  hypertrophy. Mild left neural foraminal narrowing. No right neural  foraminal narrowing. No spinal canal narrowing.    L2-3: Disc bulge. No neural foraminal narrowing. No spinal canal  narrowing.    L3-4: Susceptibility artifact obscures the inferior L3-4 disc space.  Small posterior disc bulge. No neural foraminal narrowing. No spinal  canal narrowing.    L4-5: Susceptibility artifact obscures the L4-5 disc space. No  high-grade neural foraminal narrowing or spinal canal narrowing.    L5-S1: Disc bulge with a superimposed central disc protrusion. No  neural foraminal narrowing. No spinal canal narrowing.    Paraspinous tissues are within normal limits. Hemorrhagic cyst within  the superior pole of the right kidney.      Impression    Impression:   1. Post surgical changes of L4-5 instrument posterior fusion with  discectomy, left SI fusion, and spinal cord simulator placement.  2. Minimal degenerative changes of the lumbar spine without spinal  canal or neural foraminal narrowing.       I have personally reviewed the examination and initial interpretation  and I agree with the findings.    ZOHRA AMOR MD         SYSTEM ID:  [id number]     *Note: Due to a large number of results and/or encounters for the requested time period, some results have not been displayed. A complete set of results can be found in Results Review.        Discharge Medications   Discharge Medication List as of 8/29/2021  1:30 PM      START taking these medications    Details   nicotine (NICODERM CQ) 7 MG/24HR 24 hr patch Place 1 patch onto the skin daily as needed for smoking cessation, Disp-30 patch, R-0, E-Prescribe      oxyCODONE-acetaminophen (PERCOCET) 5-325 MG tablet Take 1-2 tablets by mouth every 4 hours as needed for moderate to severe pain, Disp-7 tablet, R-0, E-Prescribe         CONTINUE these medications which have NOT CHANGED    Details   albuterol (PROAIR HFA/PROVENTIL HFA/VENTOLIN HFA) 108 (90 Base) MCG/ACT inhaler Inhale 2 puffs into the lungs every 6 hours as needed for shortness of breath / dyspnea or wheezing, Disp-18 g, R-3, E-PrescribePharmacy may dispense brand covered by insurance (Proair, or proventil or ventolin or generic albuterol inhaler)      ARIPiprazole (ABILIFY) 30 MG tablet Take 1 tablet (30 mg) by mouth At Bedtime, Disp-30 tablet, R-1, E-Prescribe      buPROPion (WELLBUTRIN XL) 300 MG 24 hr tablet Take 1 tablet (300 mg) by mouth every morning, Disp-90 tablet, R-3, E-Prescribe      diclofenac (VOLTAREN) 1 % topical gel Place 2 g onto the skin 4 times daily as needed for moderate pain Stop if any gi upset or symptoms, Disp-100 g, R-0, E-Prescribe      dicyclomine (BENTYL) 20 MG tablet Take 1 tablet (20 mg) by mouth every 6 hours, Disp-90 tablet, R-3, E-Prescribe      DULoxetine (CYMBALTA) 30 MG capsule Take 2 capsules (60 mg) by mouth daily, Disp-90 capsule, R-1, E-Prescribe      EPINEPHrine (ANY BX GENERIC EQUIV) 0.3 MG/0.3ML injection 2-pack Inject 0.3 mLs (0.3 mg) into the muscle once as needed for anaphylaxis, Disp-0.6 mL, R-3, E-Prescribe      gabapentin (NEURONTIN) 300 MG capsule Take 1 capsule (300 mg) by mouth 3 times daily, Disp-270 capsule, R-3, E-Prescribe      lidocaine (XYLOCAINE) 2 % solution Swish and swallow 15 mLs in mouth every 4 hours as needed for other (GERD) ; Max 8 doses/24 hour period. Mix with 15 mLs  Maalox or Mylanta., Disp-100 mL, R-3, E-Prescribe      melatonin 1 MG TABS tablet Take 1 tablet (1 mg) by mouth nightly as needed for sleep, Disp-30 tablet, R-0, E-Prescribe      ondansetron (ZOFRAN-ODT) 4 MG ODT tab Take 1 tablet (4 mg) by mouth every 8 hours as needed for nausea, Disp-60 tablet, R-0, E-Prescribe      pantoprazole (PROTONIX) 40 MG EC tablet Take 1 tablet (40 mg) by mouth 2 times daily Take 30-60 minutes before a meal., Disp-180 tablet, R-3, E-Prescribe      sucralfate (CARAFATE) 1 GM tablet Take 1 tablet (1 g) by mouth 4 times daily as needed (heartburn), Disp-360 tablet, R-3, E-Prescribe      azelastine (OPTIVAR) 0.05 % ophthalmic solution Place 1 drop into both eyes 2 times daily, Disp-6 mL, R-11, E-Prescribe      diltiazem 2% in PLO gel Apply small amount to anal opening three times daily for 8 weeks., Disp-60 g, R-0, E-PrescribeDispense in dosing applicator. 1 click = 0.25g of product.      medroxyPROGESTERone (DEPO-PROVERA) 150 MG/ML IM injection Inject 1 mL (150 mg) into the muscle every 3 months, Disp-3 mL, R-3, Injection      mesalamine ER (PENTASA) 500 MG CR capsule Take 1-2 capsules (500-1,000 mg) by mouth 3 times daily, Disp-180 capsule, R-11, E-Prescribe      rizatriptan (MAXALT-MLT) 5 MG ODT Take 1-2 tablets (5-10 mg) by mouth at onset of headache for migraine May repeat in 2 hours. Max 6 tablets/24 hours., Disp-18 tablet, R-3, E-Prescribe         STOP taking these medications       albuterol (PROVENTIL) (2.5 MG/3ML) 0.083% neb solution Comments:   Reason for Stopping:         furosemide (LASIX) 20 MG tablet Comments:   Reason for Stopping:         metoprolol succinate ER (TOPROL-XL) 50 MG 24 hr tablet Comments:   Reason for Stopping:         NONFORMULARY Comments:   Reason for Stopping:             Allergies   Allergies   Allergen Reactions     Iohexol Hives     Other reaction(s): Hives  IV contrast; patient states she tolerates contrast if she gets benadryl before  IV contrast;  patient states she tolerates contrast if she gets benadryl before     Other Environmental Allergy Rash     Plastic tape     Baclofen      hives     Bees Hives, Swelling and Difficulty breathing     Contrast Dye      Hives,   Updated 5/10/2016 CT Contrast.     Iodine Hives     Metoclopramide      Other reaction(s): Tremors  LW Reaction: shaking/sweating     Midazolam      Other reaction(s): Agitation     Monosodium Glutamate      Morphine Other (See Comments)     Difficulty with urination     Nsaids Other (See Comments)     GI BLEED x2     Other (Do Not Use) Other (See Comments)     Xanaflex- pt becomes disoriented and loses bladder control     Reglan [Metoclopramide Hcl] Other (See Comments)     shaking     Soma [Carisoprodol] Visual Disturbance     Sleep walking     Tizanidine      Topamax Other (See Comments)     Topamax [Topiramate] Nausea     Tingling  GI/Vomit     Tramadol      Severe Headache, Seizure Risk     Tylenol W/Codeine [Acetaminophen-Codeine] Nausea and Itching     Tylenol 3     Versed Other (See Comments)     Zolmitriptan      Makes face feel like its twitching     Droperidol Anxiety     Flu Virus Vaccine Rash      Arm swelling

## 2021-08-29 NOTE — CONSULTS
8/29- Spoke to MD about discharge plan, he stated that patient is refusing any ARU or skilled nursing facility for therapies and wants to discharge home with home therapy. Check in with multiple homecare agencies to see if they had capacity to take any new referrals; Jonathon Ruiz [facility] was able to take referral at their [place] location, and care can be initiated in 48 hours.     Referral sent and orders entered in to AVS. Bedside RN updated of this plan of care, however patient discharged prior to RNCC getting to talk to her. E-mail was sent to patient on address on facesheet to update her with referral information.     Darline Garcia RN   Care Coordinator          Care Management Initial Consult    General Information  Assessment completed with: -chart review,    Type of CM/SW Visit: Chart Assessment    Primary Care Provider verified and updated as needed:     Readmission within the last 30 days:        Reason for Consult: discharge planning, other (see comments) (referral for home PT/OT)  Advance Care Planning:            Communication Assessment  Patient's communication style: spoken language (English or Bilingual)    Hearing Difficulty or Deaf: yes   Wear Glasses or Blind: yes    Cognitive  Cognitive/Neuro/Behavioral: .WDL except, speech  Level of Consciousness: alert  Arousal Level: opens eyes spontaneously  Orientation: oriented x 4  Mood/Behavior: calm, cooperative  Best Language: 0 - No aphasia  Speech: slow, slurred    Living Environment:   People in home: alone     Current living Arrangements: mobile home      Able to return to prior arrangements: other (see comments) (refusing TCU or skilled nursing facility at this time)       Family/Social Support:  Care provided by:    Provides care for:    Marital Status: Single  Other (specify) (Mother and Brother live in [place], has a neighbor to help)          Description of Support System:           Current Resources:   Patient receiving home care  services: No (new referral for home PT/OT)     Community Resources:    Equipment currently used at home: grab bar, toilet, grab bar, tub/shower, shower chair, walker, rolling         Lifestyle & Psychosocial Needs:  Social Determinants of Health     Tobacco Use: High Risk     Smoking Tobacco Use: Current Every Day Smoker     Smokeless Tobacco Use: Never Used   Alcohol Use:      Frequency of Alcohol Consumption:      Average Number of Drinks:      Frequency of Binge Drinking:    Financial Resource Strain:      Difficulty of Paying Living Expenses:    Food Insecurity:      Worried About Running Out of Food in the Last Year:      Ran Out of Food in the Last Year:    Transportation Needs:      Lack of Transportation (Medical):      Lack of Transportation (Non-Medical):    Physical Activity:      Days of Exercise per Week:      Minutes of Exercise per Session:    Stress:      Feeling of Stress :    Social Connections:      Frequency of Communication with Friends and Family:      Frequency of Social Gatherings with Friends and Family:      Attends Congregational Services:      Active Member of Clubs or Organizations:      Attends Club or Organization Meetings:      Marital Status:    Intimate Partner Violence:      Fear of Current or Ex-Partner:      Emotionally Abused:      Physically Abused:      Sexually Abused:    Depression: At risk     PHQ-2 Score: 3   Housing Stability:      Unable to Pay for Housing in the Last Year:      Number of Places Lived in the Last Year:      Unstable Housing in the Last Year:                    Values/Beliefs:  Spiritual, Cultural Beliefs, Congregational Practices, Values that affect care:  Yadiel               Additional Information:  Patient refusing ARU or skilled nursing facility, would like to discharge to home with home therapies.     Draline Garcia RN   Care Coordinator

## 2021-08-29 NOTE — PLAN OF CARE
Physical Therapy Discharge Summary    Reason for therapy discharge:    All goals and outcomes met, no further needs identified.    Progress towards therapy goal(s). See goals on Care Plan in [facility] electronic health record for goal details.  Goals met    Therapy recommendation(s):    Continued therapy is recommended.  Rationale/Recommendations:  HH PT to set up with home exercises/walking program.    Pt has made significant gains in functional mobility. She is able to demo Rashida FWW ambulation x400', complete stairs per home set up safely without assist. Demos good standing balance (completes 180 degree turn and kneels over bed to grab mask without any LOB and can stand and sign to communicate using B UEs x1 min without LOB as well as sign single handed while ambulating with walker). Pt uses walker at baseline and has B LE deficits but mobilizing close to baseline. She reports being at ~80% her baseline, but denies concerns for ability to mobilizing or care for self at home. No further skilled IP PT needs as all mobility goals met.

## 2021-08-29 NOTE — PLAN OF CARE
Status: Pt admitted with concern of N/T in lower extremities and increased back pain  Vitals: VSS on RA  Neuros: Pt deaf and uses an . AO4, strength 4/5 throughout. Reports numbness/tingling from back down to toes intermittently, improving.   IV: PIV SL  Resp/trach: LS clear throughout  Diet: Regular diet, fair intake overnight.   Bowel status: Reports last BM 8/23, on MD list for Miralax  : Voiding spontaneously  Skin: Intact. Pt reporting severe itching, PO Benadryl given prior to Oxycodone administration, IV Benadryl given x 1  Pain: Controlled with PRN Oxycodone and IV Dilaudid. Pt sleeping in between cares.  Activity: Up with assist of 1, GB and walker.   Plan: PT/OT/SW consults. Continue to monitor and follow POC

## 2021-08-30 ENCOUNTER — APPOINTMENT (OUTPATIENT)
Dept: CT IMAGING | Facility: CLINIC | Age: 31
End: 2021-08-30
Attending: EMERGENCY MEDICINE
Payer: MEDICARE

## 2021-08-30 ENCOUNTER — PATIENT OUTREACH (OUTPATIENT)
Dept: CARE COORDINATION | Facility: CLINIC | Age: 31
End: 2021-08-30

## 2021-08-30 ENCOUNTER — HOSPITAL ENCOUNTER (OUTPATIENT)
Facility: CLINIC | Age: 31
Setting detail: OBSERVATION
Discharge: HOME | End: 2021-09-01
Attending: EMERGENCY MEDICINE | Admitting: INTERNAL MEDICINE
Payer: MEDICARE

## 2021-08-30 ENCOUNTER — APPOINTMENT (OUTPATIENT)
Dept: GENERAL RADIOLOGY | Facility: CLINIC | Age: 31
End: 2021-08-30
Attending: EMERGENCY MEDICINE
Payer: MEDICARE

## 2021-08-30 ENCOUNTER — NURSE TRIAGE (OUTPATIENT)
Dept: NURSING | Facility: CLINIC | Age: 31
End: 2021-08-30

## 2021-08-30 DIAGNOSIS — R00.0 SINUS TACHYCARDIA: ICD-10-CM

## 2021-08-30 DIAGNOSIS — M79.10 MYALGIA: ICD-10-CM

## 2021-08-30 DIAGNOSIS — Z11.52 ENCOUNTER FOR SCREENING LABORATORY TESTING FOR SEVERE ACUTE RESPIRATORY SYNDROME CORONAVIRUS 2 (SARS-COV-2): ICD-10-CM

## 2021-08-30 DIAGNOSIS — M54.42 CHRONIC BILATERAL LOW BACK PAIN WITH LEFT-SIDED SCIATICA: ICD-10-CM

## 2021-08-30 DIAGNOSIS — R50.9 FEVER, UNSPECIFIED FEVER CAUSE: ICD-10-CM

## 2021-08-30 DIAGNOSIS — K50.10 CROHN'S DISEASE OF COLON WITHOUT COMPLICATION (H): Primary | ICD-10-CM

## 2021-08-30 DIAGNOSIS — N17.9 ACUTE RENAL FAILURE, UNSPECIFIED ACUTE RENAL FAILURE TYPE (H): ICD-10-CM

## 2021-08-30 DIAGNOSIS — Z71.89 OTHER SPECIFIED COUNSELING: ICD-10-CM

## 2021-08-30 DIAGNOSIS — R00.0 TACHYCARDIA, UNSPECIFIED: ICD-10-CM

## 2021-08-30 DIAGNOSIS — N17.9 ACUTE KIDNEY INJURY (H): ICD-10-CM

## 2021-08-30 DIAGNOSIS — K92.0 HEMATEMESIS WITH NAUSEA: ICD-10-CM

## 2021-08-30 DIAGNOSIS — G89.29 CHRONIC BILATERAL LOW BACK PAIN WITH LEFT-SIDED SCIATICA: ICD-10-CM

## 2021-08-30 LAB
ALBUMIN SERPL-MCNC: 4 G/DL (ref 3.4–5)
ALP SERPL-CCNC: 101 U/L (ref 40–150)
ALT SERPL W P-5'-P-CCNC: 55 U/L (ref 0–50)
ANION GAP SERPL CALCULATED.3IONS-SCNC: <1 MMOL/L (ref 3–14)
APAP SERPL-MCNC: <2 MG/L (ref 10–30)
AST SERPL W P-5'-P-CCNC: 48 U/L (ref 0–45)
BASOPHILS # BLD AUTO: 0 10E3/UL (ref 0–0.2)
BASOPHILS NFR BLD AUTO: 1 %
BILIRUB SERPL-MCNC: 0.4 MG/DL (ref 0.2–1.3)
BUN SERPL-MCNC: 18 MG/DL (ref 7–30)
CALCIUM SERPL-MCNC: 9.6 MG/DL (ref 8.5–10.1)
CHLORIDE BLD-SCNC: 110 MMOL/L (ref 94–109)
CK SERPL-CCNC: 136 U/L (ref 30–225)
CO2 SERPL-SCNC: 30 MMOL/L (ref 20–32)
CREAT SERPL-MCNC: 1.18 MG/DL (ref 0.52–1.04)
EOSINOPHIL # BLD AUTO: 0.1 10E3/UL (ref 0–0.7)
EOSINOPHIL NFR BLD AUTO: 1 %
ERYTHROCYTE [DISTWIDTH] IN BLOOD BY AUTOMATED COUNT: 12.2 % (ref 10–15)
GFR SERPL CREATININE-BSD FRML MDRD: 61 ML/MIN/1.73M2
GLUCOSE BLD-MCNC: 65 MG/DL (ref 70–99)
GLUCOSE BLDC GLUCOMTR-MCNC: 114 MG/DL (ref 70–99)
HCG SERPL QL: NEGATIVE
HCT VFR BLD AUTO: 41.7 % (ref 35–47)
HGB BLD-MCNC: 14.1 G/DL (ref 11.7–15.7)
HOLD SPECIMEN: NORMAL
HOLD SPECIMEN: NORMAL
IMM GRANULOCYTES # BLD: 0 10E3/UL
IMM GRANULOCYTES NFR BLD: 0 %
LACTATE SERPL-SCNC: 1.4 MMOL/L (ref 0.7–2)
LIPASE SERPL-CCNC: 50 U/L (ref 73–393)
LYMPHOCYTES # BLD AUTO: 1.2 10E3/UL (ref 0.8–5.3)
LYMPHOCYTES NFR BLD AUTO: 22 %
MCH RBC QN AUTO: 32.8 PG (ref 26.5–33)
MCHC RBC AUTO-ENTMCNC: 33.8 G/DL (ref 31.5–36.5)
MCV RBC AUTO: 97 FL (ref 78–100)
MONOCYTES # BLD AUTO: 0.6 10E3/UL (ref 0–1.3)
MONOCYTES NFR BLD AUTO: 10 %
NEUTROPHILS # BLD AUTO: 3.5 10E3/UL (ref 1.6–8.3)
NEUTROPHILS NFR BLD AUTO: 66 %
NRBC # BLD AUTO: 0 10E3/UL
NRBC BLD AUTO-RTO: 0 /100
PLATELET # BLD AUTO: 283 10E3/UL (ref 150–450)
POTASSIUM BLD-SCNC: 4.2 MMOL/L (ref 3.4–5.3)
PROT SERPL-MCNC: 7.8 G/DL (ref 6.8–8.8)
RBC # BLD AUTO: 4.3 10E6/UL (ref 3.8–5.2)
SARS-COV-2 RNA RESP QL NAA+PROBE: NEGATIVE
SODIUM SERPL-SCNC: 140 MMOL/L (ref 133–144)
T4 FREE SERPL-MCNC: 1.09 NG/DL (ref 0.76–1.46)
TROPONIN I SERPL-MCNC: <0.015 UG/L (ref 0–0.04)
TSH SERPL DL<=0.005 MIU/L-ACNC: 0.39 MU/L (ref 0.4–4)
WBC # BLD AUTO: 5.4 10E3/UL (ref 4–11)

## 2021-08-30 PROCEDURE — 93005 ELECTROCARDIOGRAM TRACING: CPT | Performed by: EMERGENCY MEDICINE

## 2021-08-30 PROCEDURE — 250N000011 HC RX IP 250 OP 636: Performed by: EMERGENCY MEDICINE

## 2021-08-30 PROCEDURE — 258N000003 HC RX IP 258 OP 636: Performed by: EMERGENCY MEDICINE

## 2021-08-30 PROCEDURE — 96376 TX/PRO/DX INJ SAME DRUG ADON: CPT | Performed by: EMERGENCY MEDICINE

## 2021-08-30 PROCEDURE — 74176 CT ABD & PELVIS W/O CONTRAST: CPT | Mod: ME

## 2021-08-30 PROCEDURE — 83605 ASSAY OF LACTIC ACID: CPT | Performed by: EMERGENCY MEDICINE

## 2021-08-30 PROCEDURE — 250N000013 HC RX MED GY IP 250 OP 250 PS 637: Performed by: EMERGENCY MEDICINE

## 2021-08-30 PROCEDURE — 80053 COMPREHEN METABOLIC PANEL: CPT | Performed by: EMERGENCY MEDICINE

## 2021-08-30 PROCEDURE — C9803 HOPD COVID-19 SPEC COLLECT: HCPCS | Performed by: EMERGENCY MEDICINE

## 2021-08-30 PROCEDURE — 99285 EMERGENCY DEPT VISIT HI MDM: CPT | Mod: 25 | Performed by: EMERGENCY MEDICINE

## 2021-08-30 PROCEDURE — 82550 ASSAY OF CK (CPK): CPT | Performed by: EMERGENCY MEDICINE

## 2021-08-30 PROCEDURE — 71046 X-RAY EXAM CHEST 2 VIEWS: CPT

## 2021-08-30 PROCEDURE — 96375 TX/PRO/DX INJ NEW DRUG ADDON: CPT | Performed by: EMERGENCY MEDICINE

## 2021-08-30 PROCEDURE — 96361 HYDRATE IV INFUSION ADD-ON: CPT | Performed by: EMERGENCY MEDICINE

## 2021-08-30 PROCEDURE — 36415 COLL VENOUS BLD VENIPUNCTURE: CPT | Performed by: EMERGENCY MEDICINE

## 2021-08-30 PROCEDURE — 84439 ASSAY OF FREE THYROXINE: CPT | Performed by: EMERGENCY MEDICINE

## 2021-08-30 PROCEDURE — 96374 THER/PROPH/DIAG INJ IV PUSH: CPT | Performed by: EMERGENCY MEDICINE

## 2021-08-30 PROCEDURE — 84703 CHORIONIC GONADOTROPIN ASSAY: CPT | Performed by: EMERGENCY MEDICINE

## 2021-08-30 PROCEDURE — 84484 ASSAY OF TROPONIN QUANT: CPT | Performed by: EMERGENCY MEDICINE

## 2021-08-30 PROCEDURE — 87040 BLOOD CULTURE FOR BACTERIA: CPT | Mod: 91 | Performed by: EMERGENCY MEDICINE

## 2021-08-30 PROCEDURE — 83690 ASSAY OF LIPASE: CPT | Performed by: EMERGENCY MEDICINE

## 2021-08-30 PROCEDURE — 84443 ASSAY THYROID STIM HORMONE: CPT | Performed by: EMERGENCY MEDICINE

## 2021-08-30 PROCEDURE — 85004 AUTOMATED DIFF WBC COUNT: CPT | Performed by: EMERGENCY MEDICINE

## 2021-08-30 PROCEDURE — U0003 INFECTIOUS AGENT DETECTION BY NUCLEIC ACID (DNA OR RNA); SEVERE ACUTE RESPIRATORY SYNDROME CORONAVIRUS 2 (SARS-COV-2) (CORONAVIRUS DISEASE [COVID-19]), AMPLIFIED PROBE TECHNIQUE, MAKING USE OF HIGH THROUGHPUT TECHNOLOGIES AS DESCRIBED BY CMS-2020-01-R: HCPCS | Performed by: EMERGENCY MEDICINE

## 2021-08-30 PROCEDURE — 93010 ELECTROCARDIOGRAM REPORT: CPT | Performed by: EMERGENCY MEDICINE

## 2021-08-30 PROCEDURE — 81001 URINALYSIS AUTO W/SCOPE: CPT | Performed by: EMERGENCY MEDICINE

## 2021-08-30 PROCEDURE — 86140 C-REACTIVE PROTEIN: CPT | Performed by: STUDENT IN AN ORGANIZED HEALTH CARE EDUCATION/TRAINING PROGRAM

## 2021-08-30 PROCEDURE — 80143 DRUG ASSAY ACETAMINOPHEN: CPT | Performed by: EMERGENCY MEDICINE

## 2021-08-30 RX ORDER — ONDANSETRON 2 MG/ML
4 INJECTION INTRAMUSCULAR; INTRAVENOUS ONCE
Status: COMPLETED | OUTPATIENT
Start: 2021-08-30 | End: 2021-08-30

## 2021-08-30 RX ORDER — HYDROMORPHONE HYDROCHLORIDE 1 MG/ML
0.5 INJECTION, SOLUTION INTRAMUSCULAR; INTRAVENOUS; SUBCUTANEOUS ONCE
Status: COMPLETED | OUTPATIENT
Start: 2021-08-30 | End: 2021-08-30

## 2021-08-30 RX ORDER — DIPHENHYDRAMINE HCL 50 MG
50 CAPSULE ORAL ONCE
Status: COMPLETED | OUTPATIENT
Start: 2021-08-30 | End: 2021-08-30

## 2021-08-30 RX ADMIN — SODIUM CHLORIDE 1000 ML: 9 INJECTION, SOLUTION INTRAVENOUS at 21:55

## 2021-08-30 RX ADMIN — DIPHENHYDRAMINE HYDROCHLORIDE 50 MG: 50 CAPSULE ORAL at 21:50

## 2021-08-30 RX ADMIN — HYDROMORPHONE HYDROCHLORIDE 0.5 MG: 1 INJECTION, SOLUTION INTRAMUSCULAR; INTRAVENOUS; SUBCUTANEOUS at 22:30

## 2021-08-30 RX ADMIN — ONDANSETRON 4 MG: 2 INJECTION INTRAMUSCULAR; INTRAVENOUS at 21:48

## 2021-08-30 RX ADMIN — HYDROMORPHONE HYDROCHLORIDE 0.5 MG: 1 INJECTION, SOLUTION INTRAMUSCULAR; INTRAVENOUS; SUBCUTANEOUS at 21:49

## 2021-08-30 ASSESSMENT — ENCOUNTER SYMPTOMS
EYE REDNESS: 0
NECK PAIN: 0
DYSURIA: 0
HEADACHES: 0
MYALGIAS: 1
ABDOMINAL PAIN: 1
COUGH: 0
CONSTIPATION: 1
FEVER: 0
VOMITING: 0
SHORTNESS OF BREATH: 0
SORE THROAT: 0
BACK PAIN: 0
DIFFICULTY URINATING: 0
NAUSEA: 1
SLEEP DISTURBANCE: 0

## 2021-08-30 NOTE — TELEPHONE ENCOUNTER
Caller: Katherine - Patient outreach ([place])  Referring pt to FNA:    Katy is with an .    Reports:  - severe body aches rated 7/10, back pain, jaw and hip pain. Also joint pains.  - sweating a lot  - blisters in the bottom of her left eye and bloodshot left eye  - low grade fever started last night 99.8F. - Temperature at the time of call 97F. Took Percocet last night but did not help much with pain.  - Resting heart rate elevated. Last night was 165. Today 131 bpm.  - O2 sat 97%    She suspects COVID given that she has been at the ED 2x in the past week. Symptoms are worse today.  Admitted at the [facility] from 8/24-8/29  [facility] ED on 8/29 (by ambulance)    Per protocol, advised ED. [facility] did not offer 2nd level triage due to severe body aches, elevated pulse rate. Care advice reviewed. Patient verbalizes understanding. Advised to call back with further questions/concerns.     Spring Sullivan RN/[place] Nurse Advisor      Reason for Disposition    Patient sounds very sick or weak to the triager    Additional Information    Negative: SEVERE difficulty breathing (e.g., struggling for each breath, speaks in single words)    Negative: Difficult to awaken or acting confused (e.g., disoriented, slurred speech)    Negative: Bluish (or gray) lips or face now    Negative: Shock suspected (e.g., cold/pale/clammy skin, too weak to stand, low BP, rapid pulse)    Negative: Sounds like a life-threatening emergency to the triager    Negative: SEVERE or constant chest pain or pressure (Exception: mild central chest pain, present only when coughing)    Negative: MODERATE difficulty breathing (e.g., speaks in phrases, SOB even at rest, pulse 100-120)    Negative: [1] Headache AND [2] stiff neck (can't touch chin to chest)    Negative: MILD difficulty breathing (e.g., minimal/no SOB at rest, SOB with walking, pulse <100)    Negative: Chest pain or pressure    Protocols used: CORONAVIRUS (COVID-19) DIAGNOSED OR [id number]  3.25

## 2021-08-30 NOTE — TELEPHONE ENCOUNTER
"Patient calling using :\"I am having a moderate amount rectal bleeding with no stool, heart rate is 165, I am dizzy and have abdominal pain(rates pain 5/10), dizzy, and too weak to stand.\"   Denies other sx  Triaged and advised 911  Caller asked if I could call 911, she is home alone.   911 [facility] was called, they will send ambulance  Paradise Hassan RN [place] Nurse Advisors        Reason for Disposition    Shock suspected (e.g., cold/pale/clammy skin, too weak to stand, low BP, rapid pulse)    Protocols used: RECTAL BLEEDING-A-AH      "

## 2021-08-30 NOTE — PROGRESS NOTES
Clinic Care Coordination Contact  [facility]: Post-Discharge Note  SITUATION                                                      Admission:    Admission Date: 08/24/21   Reason for Admission: Acute on chronic  back pain d/2 disc disease  Discharge:   Discharge Date: 08/29/21  Discharge Diagnosis: Acute on chronic  back pain d/2 disc disease    BACKGROUND                                                             Katy Islas is a 31 year old female has a pmh of crohns disease, depression,gerd,nicotine addiction,abdominal cramps presented with acute on chronic back.      Her admission workup was significant for known Chronic spinal disease with well positioned hardware in the lumbar and thoracic spine along with a spinal stimulator seen on MRI of the lumbar spine 8/28. Patient recently had a ct abdomen, 8/24 that was unremarkable, lipase and urinalysis were also normal.      Her pain responded well to conservative measures with therapy and better pain management. Per ot/pt request patient was considered a rehab candidate. On PM & R was consulted on 8/27 and deemed the patient a better candidate for skilled placement for rehab. These options were presented to the patient and she refused skilled placement options and rather have outpatient care under a pain specialist with services come to her home as she has cats to take care of.       ASSESSMENT           Discharge Assessment  How are you doing now that you are home?: Patient does not feel well,  She has a lot of pain and has been sleeping all day.  Asked to talk to the nurse.  She is also concerned she may have Covid  How are your symptoms? (Red Flag symptoms escalate to triage hotline per guidelines): Unchanged  Do you feel your condition is stable enough to be safe at home until your provider visit?: No (see comment) (lots of pain)  Does the patient have their discharge instructions? : Yes  Does the patient have questions regarding their discharge  instructions? : No         Post-op (Clinicians Only)  Fever: Yes (99.7)  Chills: Yes    Triage RN has advised pt to return to the ED    PLAN                                                      Outpatient Plan:     Future Appointments   Date Time Provider Department Center   10/7/2021  1:00 PM Triston Schroeder PA-C [facility] [place]         For any urgent concerns, please contact our 24 hour nurse triage line: [phone] ([phone])         Katherine Carlson  [facility] Resource [facility]

## 2021-08-30 NOTE — PROGRESS NOTES
Clinic Care Coordination Contact  [facility]/Voicemail       Clinical Data: Care Coordinator Outreach    Outreach attempted x 1.  Left message on patient's voicemail with help from  with call back information.    Plan: Care Coordinator will try to reach patient again in 1-2 business days.    Katherine Carlson  [facility]  [facility]

## 2021-08-30 NOTE — PLAN OF CARE
Occupational Therapy Discharge Summary    Reason for therapy discharge:    Discharged to home with home therapy.    Progress towards therapy goal(s). See goals on Care Plan in [facility] electronic health record for goal details.  Goals partially met.  Barriers to achieving goals:   discharge from facility.    Therapy recommendation(s):    Continued therapy is recommended.  Rationale/Recommendations:  Pt would benefit from continued skilled therapies for increased IND and safety for I/ADLs.

## 2021-08-31 ENCOUNTER — OFFICE VISIT (OUTPATIENT)
Dept: INTERPRETER SERVICES | Facility: CLINIC | Age: 31
End: 2021-08-31
Payer: MEDICARE

## 2021-08-31 LAB
ALBUMIN SERPL-MCNC: 3.4 G/DL (ref 3.4–5)
ALBUMIN UR-MCNC: NEGATIVE MG/DL
ALP SERPL-CCNC: 78 U/L (ref 40–150)
ALT SERPL W P-5'-P-CCNC: 54 U/L (ref 0–50)
ANION GAP SERPL CALCULATED.3IONS-SCNC: 4 MMOL/L (ref 3–14)
APPEARANCE UR: CLEAR
AST SERPL W P-5'-P-CCNC: 50 U/L (ref 0–45)
ATRIAL RATE - MUSE: 108 BPM
BASOPHILS # BLD AUTO: 0 10E3/UL (ref 0–0.2)
BASOPHILS NFR BLD AUTO: 1 %
BILIRUB SERPL-MCNC: 0.3 MG/DL (ref 0.2–1.3)
BILIRUB UR QL STRIP: NEGATIVE
BUN SERPL-MCNC: 13 MG/DL (ref 7–30)
CALCIUM SERPL-MCNC: 8.1 MG/DL (ref 8.5–10.1)
CHLORIDE BLD-SCNC: 108 MMOL/L (ref 94–109)
CO2 SERPL-SCNC: 28 MMOL/L (ref 20–32)
COLOR UR AUTO: YELLOW
CREAT SERPL-MCNC: 0.93 MG/DL (ref 0.52–1.04)
CRP SERPL-MCNC: 6.5 MG/L (ref 0–8)
DIASTOLIC BLOOD PRESSURE - MUSE: NORMAL MMHG
EOSINOPHIL # BLD AUTO: 0.2 10E3/UL (ref 0–0.7)
EOSINOPHIL NFR BLD AUTO: 5 %
ERYTHROCYTE [DISTWIDTH] IN BLOOD BY AUTOMATED COUNT: 12.2 % (ref 10–15)
ERYTHROCYTE [DISTWIDTH] IN BLOOD BY AUTOMATED COUNT: 12.4 % (ref 10–15)
GFR SERPL CREATININE-BSD FRML MDRD: 82 ML/MIN/1.73M2
GLUCOSE BLD-MCNC: 90 MG/DL (ref 70–99)
GLUCOSE UR STRIP-MCNC: NEGATIVE MG/DL
HCT VFR BLD AUTO: 32.4 % (ref 35–47)
HCT VFR BLD AUTO: 32.9 % (ref 35–47)
HGB BLD-MCNC: 11 G/DL (ref 11.7–15.7)
HGB BLD-MCNC: 11 G/DL (ref 11.7–15.7)
HGB BLD-MCNC: 11.6 G/DL (ref 11.7–15.7)
HGB UR QL STRIP: NEGATIVE
IMM GRANULOCYTES # BLD: 0 10E3/UL
IMM GRANULOCYTES NFR BLD: 0 %
INTERPRETATION ECG - MUSE: NORMAL
KETONES UR STRIP-MCNC: NEGATIVE MG/DL
LEUKOCYTE ESTERASE UR QL STRIP: NEGATIVE
LYMPHOCYTES # BLD AUTO: 1.3 10E3/UL (ref 0.8–5.3)
LYMPHOCYTES NFR BLD AUTO: 35 %
MCH RBC QN AUTO: 32.6 PG (ref 26.5–33)
MCH RBC QN AUTO: 33.5 PG (ref 26.5–33)
MCHC RBC AUTO-ENTMCNC: 33.4 G/DL (ref 31.5–36.5)
MCHC RBC AUTO-ENTMCNC: 34 G/DL (ref 31.5–36.5)
MCV RBC AUTO: 98 FL (ref 78–100)
MCV RBC AUTO: 99 FL (ref 78–100)
MONOCYTES # BLD AUTO: 0.6 10E3/UL (ref 0–1.3)
MONOCYTES NFR BLD AUTO: 16 %
MUCOUS THREADS #/AREA URNS LPF: PRESENT /LPF
NEUTROPHILS # BLD AUTO: 1.6 10E3/UL (ref 1.6–8.3)
NEUTROPHILS NFR BLD AUTO: 43 %
NITRATE UR QL: NEGATIVE
NRBC # BLD AUTO: 0 10E3/UL
NRBC BLD AUTO-RTO: 0 /100
P AXIS - MUSE: 19 DEGREES
PH UR STRIP: 6 [PH] (ref 5–7)
PLATELET # BLD AUTO: 206 10E3/UL (ref 150–450)
PLATELET # BLD AUTO: 209 10E3/UL (ref 150–450)
POTASSIUM BLD-SCNC: 4 MMOL/L (ref 3.4–5.3)
PR INTERVAL - MUSE: 112 MS
PROCALCITONIN SERPL-MCNC: <0.05 NG/ML
PROT SERPL-MCNC: 6.3 G/DL (ref 6.8–8.8)
QRS DURATION - MUSE: 78 MS
QT - MUSE: 328 MS
QTC - MUSE: 439 MS
R AXIS - MUSE: 30 DEGREES
RBC # BLD AUTO: 3.28 10E6/UL (ref 3.8–5.2)
RBC # BLD AUTO: 3.37 10E6/UL (ref 3.8–5.2)
RBC URINE: 0 /HPF
SODIUM SERPL-SCNC: 140 MMOL/L (ref 133–144)
SP GR UR STRIP: 1.02 (ref 1–1.03)
SQUAMOUS EPITHELIAL: 4 /HPF
SYSTOLIC BLOOD PRESSURE - MUSE: NORMAL MMHG
T AXIS - MUSE: 33 DEGREES
UROBILINOGEN UR STRIP-MCNC: NORMAL MG/DL
VENTRICULAR RATE- MUSE: 108 BPM
WBC # BLD AUTO: 3.6 10E3/UL (ref 4–11)
WBC # BLD AUTO: 4.2 10E3/UL (ref 4–11)
WBC URINE: 1 /HPF

## 2021-08-31 PROCEDURE — 36415 COLL VENOUS BLD VENIPUNCTURE: CPT | Performed by: INTERNAL MEDICINE

## 2021-08-31 PROCEDURE — 250N000011 HC RX IP 250 OP 636: Performed by: STUDENT IN AN ORGANIZED HEALTH CARE EDUCATION/TRAINING PROGRAM

## 2021-08-31 PROCEDURE — 250N000013 HC RX MED GY IP 250 OP 250 PS 637: Mod: GY | Performed by: STUDENT IN AN ORGANIZED HEALTH CARE EDUCATION/TRAINING PROGRAM

## 2021-08-31 PROCEDURE — 99207 PR CDG-MDM COMPONENT: MEETS MODERATE - UP CODED: CPT | Performed by: STUDENT IN AN ORGANIZED HEALTH CARE EDUCATION/TRAINING PROGRAM

## 2021-08-31 PROCEDURE — 250N000013 HC RX MED GY IP 250 OP 250 PS 637: Performed by: EMERGENCY MEDICINE

## 2021-08-31 PROCEDURE — 99223 1ST HOSP IP/OBS HIGH 75: CPT | Mod: AI | Performed by: STUDENT IN AN ORGANIZED HEALTH CARE EDUCATION/TRAINING PROGRAM

## 2021-08-31 PROCEDURE — 84145 PROCALCITONIN (PCT): CPT | Performed by: STUDENT IN AN ORGANIZED HEALTH CARE EDUCATION/TRAINING PROGRAM

## 2021-08-31 PROCEDURE — 36415 COLL VENOUS BLD VENIPUNCTURE: CPT | Performed by: STUDENT IN AN ORGANIZED HEALTH CARE EDUCATION/TRAINING PROGRAM

## 2021-08-31 PROCEDURE — 85018 HEMOGLOBIN: CPT | Performed by: INTERNAL MEDICINE

## 2021-08-31 PROCEDURE — 250N000011 HC RX IP 250 OP 636: Performed by: EMERGENCY MEDICINE

## 2021-08-31 PROCEDURE — 85025 COMPLETE CBC W/AUTO DIFF WBC: CPT | Performed by: STUDENT IN AN ORGANIZED HEALTH CARE EDUCATION/TRAINING PROGRAM

## 2021-08-31 PROCEDURE — 85027 COMPLETE CBC AUTOMATED: CPT | Performed by: STUDENT IN AN ORGANIZED HEALTH CARE EDUCATION/TRAINING PROGRAM

## 2021-08-31 PROCEDURE — 120N000002 HC R&B MED SURG/OB UMMC

## 2021-08-31 PROCEDURE — 96376 TX/PRO/DX INJ SAME DRUG ADON: CPT | Performed by: EMERGENCY MEDICINE

## 2021-08-31 PROCEDURE — 82040 ASSAY OF SERUM ALBUMIN: CPT | Performed by: STUDENT IN AN ORGANIZED HEALTH CARE EDUCATION/TRAINING PROGRAM

## 2021-08-31 PROCEDURE — T1013 SIGN LANG/ORAL INTERPRETER: HCPCS | Mod: U3

## 2021-08-31 PROCEDURE — 96375 TX/PRO/DX INJ NEW DRUG ADDON: CPT | Performed by: EMERGENCY MEDICINE

## 2021-08-31 RX ORDER — OXYCODONE HYDROCHLORIDE 5 MG/1
5 TABLET ORAL EVERY 4 HOURS PRN
Status: DISCONTINUED | OUTPATIENT
Start: 2021-08-31 | End: 2021-08-31

## 2021-08-31 RX ORDER — METHOCARBAMOL 500 MG/1
500 TABLET, FILM COATED ORAL ONCE
Status: COMPLETED | OUTPATIENT
Start: 2021-08-31 | End: 2021-08-31

## 2021-08-31 RX ORDER — BUSPIRONE HYDROCHLORIDE 15 MG/1
15 TABLET ORAL 3 TIMES DAILY
COMMUNITY
End: 2022-01-31

## 2021-08-31 RX ORDER — DIPHENHYDRAMINE HYDROCHLORIDE 50 MG/ML
25 INJECTION INTRAMUSCULAR; INTRAVENOUS ONCE
Status: COMPLETED | OUTPATIENT
Start: 2021-08-31 | End: 2021-08-31

## 2021-08-31 RX ORDER — PANTOPRAZOLE SODIUM 40 MG/1
40 TABLET, DELAYED RELEASE ORAL 2 TIMES DAILY
Status: DISCONTINUED | OUTPATIENT
Start: 2021-08-31 | End: 2021-09-01 | Stop reason: HOSPADM

## 2021-08-31 RX ORDER — ONDANSETRON 2 MG/ML
4 INJECTION INTRAMUSCULAR; INTRAVENOUS ONCE
Status: COMPLETED | OUTPATIENT
Start: 2021-08-31 | End: 2021-08-31

## 2021-08-31 RX ORDER — DICYCLOMINE HCL 20 MG
20 TABLET ORAL EVERY 6 HOURS
Status: DISCONTINUED | OUTPATIENT
Start: 2021-08-31 | End: 2021-09-01 | Stop reason: HOSPADM

## 2021-08-31 RX ORDER — CLONAZEPAM 1 MG/1
1 TABLET ORAL 2 TIMES DAILY PRN
COMMUNITY
End: 2022-01-31

## 2021-08-31 RX ORDER — METOPROLOL SUCCINATE 50 MG/1
50 TABLET, EXTENDED RELEASE ORAL DAILY
COMMUNITY
End: 2022-01-31

## 2021-08-31 RX ORDER — ALBUTEROL SULFATE 90 UG/1
2 AEROSOL, METERED RESPIRATORY (INHALATION) EVERY 6 HOURS PRN
Status: DISCONTINUED | OUTPATIENT
Start: 2021-08-31 | End: 2021-09-01 | Stop reason: HOSPADM

## 2021-08-31 RX ORDER — ONDANSETRON 2 MG/ML
8 INJECTION INTRAMUSCULAR; INTRAVENOUS ONCE
Status: DISCONTINUED | OUTPATIENT
Start: 2021-08-31 | End: 2021-08-31

## 2021-08-31 RX ORDER — HYDROMORPHONE HYDROCHLORIDE 1 MG/ML
0.5 INJECTION, SOLUTION INTRAMUSCULAR; INTRAVENOUS; SUBCUTANEOUS
Status: DISCONTINUED | OUTPATIENT
Start: 2021-08-31 | End: 2021-08-31

## 2021-08-31 RX ORDER — BUPROPION HYDROCHLORIDE 150 MG/1
300 TABLET ORAL EVERY MORNING
Status: DISCONTINUED | OUTPATIENT
Start: 2021-08-31 | End: 2021-09-01 | Stop reason: HOSPADM

## 2021-08-31 RX ORDER — LIDOCAINE 40 MG/G
CREAM TOPICAL
Status: DISCONTINUED | OUTPATIENT
Start: 2021-08-31 | End: 2021-09-01 | Stop reason: HOSPADM

## 2021-08-31 RX ORDER — MESALAMINE 500 MG/1
500-1000 CAPSULE, EXTENDED RELEASE ORAL 3 TIMES DAILY
Status: DISCONTINUED | OUTPATIENT
Start: 2021-08-31 | End: 2021-09-01 | Stop reason: HOSPADM

## 2021-08-31 RX ORDER — HYDROMORPHONE HYDROCHLORIDE 1 MG/ML
0.5 INJECTION, SOLUTION INTRAMUSCULAR; INTRAVENOUS; SUBCUTANEOUS EVERY 6 HOURS PRN
Status: DISCONTINUED | OUTPATIENT
Start: 2021-08-31 | End: 2021-09-01 | Stop reason: HOSPADM

## 2021-08-31 RX ORDER — DIPHENHYDRAMINE HCL 25 MG
25-50 CAPSULE ORAL EVERY 6 HOURS PRN
Status: DISCONTINUED | OUTPATIENT
Start: 2021-08-31 | End: 2021-09-01 | Stop reason: HOSPADM

## 2021-08-31 RX ORDER — DULOXETIN HYDROCHLORIDE 60 MG/1
60 CAPSULE, DELAYED RELEASE ORAL DAILY
Status: DISCONTINUED | OUTPATIENT
Start: 2021-08-31 | End: 2021-09-01 | Stop reason: HOSPADM

## 2021-08-31 RX ORDER — ONDANSETRON 4 MG/1
4 TABLET, ORALLY DISINTEGRATING ORAL EVERY 8 HOURS PRN
Status: DISCONTINUED | OUTPATIENT
Start: 2021-08-31 | End: 2021-09-01 | Stop reason: HOSPADM

## 2021-08-31 RX ORDER — ACETAMINOPHEN 325 MG/1
650 TABLET ORAL EVERY 4 HOURS PRN
Status: DISCONTINUED | OUTPATIENT
Start: 2021-08-31 | End: 2021-09-01 | Stop reason: HOSPADM

## 2021-08-31 RX ORDER — POLYETHYLENE GLYCOL 3350 17 G/17G
17 POWDER, FOR SOLUTION ORAL 2 TIMES DAILY
Status: DISCONTINUED | OUTPATIENT
Start: 2021-08-31 | End: 2021-09-01 | Stop reason: HOSPADM

## 2021-08-31 RX ORDER — ARIPIPRAZOLE 30 MG/1
30 TABLET ORAL AT BEDTIME
Status: DISCONTINUED | OUTPATIENT
Start: 2021-08-31 | End: 2021-09-01 | Stop reason: HOSPADM

## 2021-08-31 RX ORDER — SUCRALFATE 1 G/1
1 TABLET ORAL 4 TIMES DAILY PRN
Status: DISCONTINUED | OUTPATIENT
Start: 2021-08-31 | End: 2021-09-01 | Stop reason: HOSPADM

## 2021-08-31 RX ORDER — CYCLOBENZAPRINE HCL 10 MG
10 TABLET ORAL 3 TIMES DAILY PRN
COMMUNITY
End: 2021-10-06

## 2021-08-31 RX ORDER — OXYCODONE HYDROCHLORIDE 5 MG/1
5-10 TABLET ORAL EVERY 4 HOURS PRN
Status: DISCONTINUED | OUTPATIENT
Start: 2021-08-31 | End: 2021-08-31

## 2021-08-31 RX ORDER — HYDROMORPHONE HYDROCHLORIDE 1 MG/ML
0.5 INJECTION, SOLUTION INTRAMUSCULAR; INTRAVENOUS; SUBCUTANEOUS EVERY 4 HOURS PRN
Status: DISCONTINUED | OUTPATIENT
Start: 2021-08-31 | End: 2021-08-31

## 2021-08-31 RX ORDER — FUROSEMIDE 20 MG
20 TABLET ORAL DAILY
Status: ON HOLD | COMMUNITY
End: 2021-09-01

## 2021-08-31 RX ORDER — HYDROMORPHONE HYDROCHLORIDE 2 MG/1
2 TABLET ORAL EVERY 4 HOURS PRN
Status: DISCONTINUED | OUTPATIENT
Start: 2021-08-31 | End: 2021-09-01 | Stop reason: HOSPADM

## 2021-08-31 RX ORDER — GABAPENTIN 300 MG/1
300 CAPSULE ORAL 3 TIMES DAILY
Status: DISCONTINUED | OUTPATIENT
Start: 2021-08-31 | End: 2021-09-01 | Stop reason: HOSPADM

## 2021-08-31 RX ADMIN — HYDROMORPHONE HYDROCHLORIDE 0.5 MG: 1 INJECTION, SOLUTION INTRAMUSCULAR; INTRAVENOUS; SUBCUTANEOUS at 03:17

## 2021-08-31 RX ADMIN — HYDROMORPHONE HYDROCHLORIDE 2 MG: 2 TABLET ORAL at 22:11

## 2021-08-31 RX ADMIN — PANTOPRAZOLE SODIUM 40 MG: 40 TABLET, DELAYED RELEASE ORAL at 21:26

## 2021-08-31 RX ADMIN — MESALAMINE 500 MG: 500 CAPSULE ORAL at 09:22

## 2021-08-31 RX ADMIN — DICYCLOMINE HYDROCHLORIDE 20 MG: 20 TABLET ORAL at 12:53

## 2021-08-31 RX ADMIN — DIPHENHYDRAMINE HYDROCHLORIDE 25 MG: 50 INJECTION, SOLUTION INTRAMUSCULAR; INTRAVENOUS at 05:15

## 2021-08-31 RX ADMIN — POLYETHYLENE GLYCOL 3350 17 G: 17 POWDER, FOR SOLUTION ORAL at 08:13

## 2021-08-31 RX ADMIN — HYDROMORPHONE HYDROCHLORIDE 0.5 MG: 1 INJECTION, SOLUTION INTRAMUSCULAR; INTRAVENOUS; SUBCUTANEOUS at 00:39

## 2021-08-31 RX ADMIN — ACETAMINOPHEN 650 MG: 325 TABLET, FILM COATED ORAL at 13:46

## 2021-08-31 RX ADMIN — METHOCARBAMOL 500 MG: 500 TABLET, FILM COATED ORAL at 02:10

## 2021-08-31 RX ADMIN — DIPHENHYDRAMINE HYDROCHLORIDE 25 MG: 25 CAPSULE ORAL at 17:14

## 2021-08-31 RX ADMIN — ACETAMINOPHEN 650 MG: 325 TABLET, FILM COATED ORAL at 09:22

## 2021-08-31 RX ADMIN — DULOXETINE HYDROCHLORIDE 60 MG: 30 CAPSULE, DELAYED RELEASE ORAL at 08:13

## 2021-08-31 RX ADMIN — GABAPENTIN 300 MG: 300 CAPSULE ORAL at 21:26

## 2021-08-31 RX ADMIN — HYDROMORPHONE HYDROCHLORIDE 0.5 MG: 1 INJECTION, SOLUTION INTRAMUSCULAR; INTRAVENOUS; SUBCUTANEOUS at 01:38

## 2021-08-31 RX ADMIN — GABAPENTIN 300 MG: 300 CAPSULE ORAL at 08:13

## 2021-08-31 RX ADMIN — ONDANSETRON 4 MG: 4 TABLET, ORALLY DISINTEGRATING ORAL at 09:22

## 2021-08-31 RX ADMIN — BUPROPION HYDROCHLORIDE 300 MG: 150 TABLET, EXTENDED RELEASE ORAL at 08:13

## 2021-08-31 RX ADMIN — HYDROMORPHONE HYDROCHLORIDE 2 MG: 2 TABLET ORAL at 18:05

## 2021-08-31 RX ADMIN — GABAPENTIN 300 MG: 300 CAPSULE ORAL at 13:46

## 2021-08-31 RX ADMIN — DICYCLOMINE HYDROCHLORIDE 20 MG: 20 TABLET ORAL at 09:21

## 2021-08-31 RX ADMIN — DIPHENHYDRAMINE HYDROCHLORIDE 25 MG: 25 CAPSULE ORAL at 19:11

## 2021-08-31 RX ADMIN — ARIPIPRAZOLE 30 MG: 30 TABLET ORAL at 21:27

## 2021-08-31 RX ADMIN — ONDANSETRON 4 MG: 2 INJECTION INTRAMUSCULAR; INTRAVENOUS at 03:17

## 2021-08-31 RX ADMIN — PANTOPRAZOLE SODIUM 40 MG: 40 TABLET, DELAYED RELEASE ORAL at 08:13

## 2021-08-31 RX ADMIN — HYDROMORPHONE HYDROCHLORIDE 0.5 MG: 1 INJECTION, SOLUTION INTRAMUSCULAR; INTRAVENOUS; SUBCUTANEOUS at 16:36

## 2021-08-31 RX ADMIN — MESALAMINE 500 MG: 500 CAPSULE ORAL at 13:46

## 2021-08-31 RX ADMIN — HYDROMORPHONE HYDROCHLORIDE 0.5 MG: 1 INJECTION, SOLUTION INTRAMUSCULAR; INTRAVENOUS; SUBCUTANEOUS at 04:48

## 2021-08-31 RX ADMIN — MESALAMINE 1000 MG: 500 CAPSULE ORAL at 21:27

## 2021-08-31 ASSESSMENT — ACTIVITIES OF DAILY LIVING (ADL)
TOILETING_ASSISTANCE: TOILETING DIFFICULTY, ASSISTANCE 1 PERSON
FALL_HISTORY_WITHIN_LAST_SIX_MONTHS: YES
HEARING_MANAGEMENT: IN PERSON
DIFFICULTY_EATING/SWALLOWING: NO
VISION_MANAGEMENT: GLASSESS
TOILETING_MANAGEMENT: G
DRESSING/BATHING_DIFFICULTY: YES
DOING_ERRANDS_INDEPENDENTLY_DIFFICULTY: NO
DESCRIBE_HEARING_LOSS: BILATERAL HEARING LOSS
PATIENT'S_PREFERRED_MEANS_OF_COMMUNICATION: SIGN LANGUAGE INTERPRETER
CONCENTRATING,_REMEMBERING_OR_MAKING_DECISIONS_DIFFICULTY: NO
WEAR_GLASSES_OR_BLIND: YES
TOILETING_ISSUES: YES
EQUIPMENT_CURRENTLY_USED_AT_HOME: WALKER, STANDARD
DRESSING/BATHING_MANAGEMENT: SHOWER CHAIR
DRESSING/BATHING: BATHING DIFFICULTY, REQUIRES EQUIPMENT
WALKING_OR_CLIMBING_STAIRS: AMBULATION DIFFICULTY, REQUIRES EQUIPMENT
WERE_AUXILIARY_AIDS_OFFERED?: NO
HEARING_DIFFICULTY_OR_DEAF: YES
DIFFICULTY_COMMUNICATING: NO
USE_OF_HEARING_ASSISTIVE_DEVICES: RIGHT HEARING AID
WALKING_OR_CLIMBING_STAIRS_DIFFICULTY: NO

## 2021-08-31 NOTE — H&P
[facility]    History and Physical - Hospitalist Service       Date of Admission:  8/30/2021    Assessment & Plan      Katy Islas is a 31 year old female admitted on 8/30/2021. She has PMH of chronic back pain (admitted at [facility] 8/24-8/29), GERD, depression, bipolar disorder, borderline personality disorder, and Crohn's Disease admitted for generalized myalgia, tachycardia, borderline fever admitted for pain control and workup.    Sinus tachycardia, diffuse myalgias, malaise, low grade temperature  Tachycardic, temp ~99F, no leukocytosis or lactic acidosis, nl RR. C/o racing heart beat, diffuse myalgias, general malaise. Workup including CBC, trop, lipase, lactate, CK, TSH/T4, COVID, UA, procal, CRP neg/normal. CXR shows linear infiltrate in right base likely atelectasis/scar, cannot exclude LLL PNA. CT A/P showed left basilar and lingular PNA vs atelectasis unchanged from prior imaging, otherwise negative imaging. Overall low concern for infectious source - she has had multiple ED visits in Care Everywhere for similar symptoms dating back to July or earlier and it is unlikely she has an occult infection that has not been found. She has been tachycardic throughout most of those visits as well. Most likely this is related to her chronic pain, fibromyalgia; could also consider a viral syndrome with some sore throat but no other URI sxs. Could consider withdrawal or drug reaction but patient states no new medications other than Percocet recently and she has been taking this with no improvement in pain.   - Admitted to Medicine for sepsis r/o  - blood cultures pending, monitor off abx. All other workup negative thus far.   - PT consulted as patient feels she cannot ambulate d/t pain  Last admission had rec skilled facility but patient opted to return home.    Mildly elevated Cr  Likely related to decreased intake over last two days. Baseline ~0.86-0.92, is  1.18 on admission.  - Given IVFs, repeat CMP in AM    Mild transaminitis  Very mildly elevated ALT 55, AST 48.   - CMP in AM    Chronic back pain  Fibromyalgia  Recently admitted to [facility] 8/24-8/29 for acute on chronic back pain, had MRI lumbar spine 8/28, pain control better with therapy and pain management. Offered skilled placement for rehab but patient declined, opted for outpatient care with pain specialist. This admission she has received IV pain medications and she requested fentanyl IV or patch for pain control.  - continue PTA gabapentin  - Has PTA Percocet but patient states not working. Requested PRN PO Dilaudid rather than PO oxycodone.  - Tylenol PRN  - IV Dilaudid for breakthrough pain  - On chart review, multiple encounters for pain management. Would try to limit opioid use as much as possible. She would benefit from outpatient pain referral.     Chronic abdominal pain  Constipation  CT A/P with no acute findings to explain her pain. May be related to constipation.   - Miralax BID, can add stool softener if needed  - Continue PTA Bentyl     Crohns disease  - Continue PTA mesalamine    Rectal bleeding  Known hemorrhoids, multiple evaluations for this in ED.   - Constipation management    Depression  Bipolar d/o  - Continue PTA Abilify, Wellbutrin, Cymbalta     Tobacco use  - Declines nicotine patch    GERD  - Continue PTA pantoprazole and Carafate    Easy bruising  - outpatient heme appt in Oct       Diet:   regular diet  DVT Prophylaxis: Pneumatic Compression Devices   Carrion Catheter: Not present  Central Lines: None  Code Status:   Full      Disposition Plan   Expected discharge: 2 days recommended to prior living arrangement once adequate pain management/ tolerating PO medications, renal function improved and ruled out sepsis.     The patient's care was discussed with the Patient.    Herberth Ventura MD (Sally)  Internal Medicine/Pediatrics  [facility]  Securely message with the [employer] Web Console (learn more here)  Text page via [facility] Paging/Directory      ______________________________________________________________________    Chief Complaint   Fast heart rate, malaise, muscle aches    History is obtained from the patient and chart review.    History of Present Illness   Katy Islas is a 31 year old female with PMH of deafness, chronic back pain (admitted at [facility] 8/24-8/29), GERD, depression, bipolar disorder, fibromyalgia, borderline personality disorder, and Crohn's Disease admitted for generalized myalgia, tachycardia, borderline fever.    Patient was recently admitted until 8/29 on the [facility] for poor control of mental health and for acute on chronic back pain. During that visit had also mentioned easy bruising. Pain improved with therapy and pain control, PT/OT rec skilled placement for rehab but patient declined, opted to have outpatient f/up with pain specialist. She has outpatient heme/onc eval in October for easy bruising.     Patient reports that since she got home, she has had onset of rapid heartbeat and profuse sweating. She went to the ED at [facility] on 8/29; I don't see an MD note but she states she had an EKG and was discharged home. HR during that visit 93, EKG HR 99. The morning of admission she continued to feel like her heart was racing and she was very sweaty. Her entire body and joints hurt. She can barely walk due to the pain. She reports temp of 99.8F at home and HR in the 180s. She almost fell on her way to the ED due to leg pain and weakness, legs feel shaky. She has vomited once. She has had rectal bleeding w mucus x 1 yesterday (small amount, bright red blood, painful with defecation, blood on toilet paper, known hemorrhoids). She is constipated at baseline and takes miralax 2-3 times a day. She has hx of anal fissure and abscess that required surgical intervention in the past. She has  "abd pain that is worse than her baseline. She had some chest pain before admission but not currently. She has had a sore throat since this morning. She has some burning with urination. She reports easy bruising all over her body x 6 weeks but is now improving. She has not been eating well but has been drinking a lot of water. She has chronic back pain and she notes her pain is not controlled by PTA Percocet. Denies chills, nausea, SOB, rhinorrhea, congestion, cough, diarrhea.    On chart review, she has had numerous ED visits and admissions in Care Everywhere. Admitted 8/2-8/4 for L sided weakness and paresthesias with negative workup. In the ED 8/7 for syncope and fall and she left AMA. Admitted 8/9 at [facility] for headache, generalized myalgias, chest pain for several months, admitted to treat for possible LLL PNA (though imaging stable from prior and negative procal) but she left AMA and then went to [facility] ED for evaluation on the same day (\"for edema, persistent PNA, pain all over body, frequent falls, and bruising\"). ED visits 8/10 bruising and ?leg cellulitis tx with clindamycin, 8/12 for headache and bruising, 8/18 for rectal pain with negative CT scan, etc. She has several visits throughout July for myalgias as well. Throughout the majority of these visits she has been noted to have sinus tachycardia.     Review of Systems    The 10 point Review of Systems is negative other than noted in the HPI or here.     Past Medical History    I have reviewed this patient's medical history and updated it with pertinent information if needed.   Past Medical History:   Diagnosis Date     Abdominal pain 10/31- 11/4/2005    [facility] admit for Crohn's     Allergic rhinitis, cause unspecified     Allergic rhinitis     Arthritis      C. difficile diarrhea     Past, no current diarrhea.     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, without mention of intractable epilepsy     pseudoseizures diagnosed after extensive neurologic eval     Migraine 07/21/12    D/C 07/22/12-[facility]     Migraine, unspecified, without mention of intractable migraine without mention of status migrainosus     Migraine     Mild intermittent asthma     mild intermittent     Mycoplasma infection in conditions classified elsewhere and of unspecified site      Other chronic pain     Back pain for 6 years     Renal disease     Kidney stones     Unspecified hearing loss     congenital hearing loss       Past Surgical History   I have reviewed this patient's surgical history and updated it with pertinent information if needed.  Past Surgical History:   Procedure Laterality Date     AS REMOVAL OF COCCYX  10/18/2017     C FUSION OF SACROILIAC JOINT  10/26/2018     COLONOSCOPY  7/1/2009    [facility].     COLONOSCOPY N/A 7/17/2019    Procedure: Colonoscopy, With Polypectomy And Biopsy;  Surgeon: Edwin Conde MD;  Location: [facility] OR     COLONOSCOPY WITH CO2 INSUFFLATION N/A 7/17/2019    Procedure: COLONOSCOPY, WITH CO2 INSUFFLATION;  Surgeon: Edwin Conde MD;  Location: [facility] OR     COMBINED ESOPHAGOSCOPY, GASTROSCOPY, DUODENOSCOPY (EGD) WITH CO2 INSUFFLATION N/A 4/12/2019    Procedure: COMBINED ESOPHAGOSCOPY, GASTROSCOPY, DUODENOSCOPY (EGD) WITH CO2 INSUFFLATION;  Surgeon: Edwin Conde MD;  Location: [facility] OR     ESOPHAGOSCOPY, GASTROSCOPY, DUODENOSCOPY (EGD), COMBINED N/A 4/12/2019    Procedure: Combined Esophagoscopy,  Gastroscopy, Duodenoscopy (Egd), Biopsy Single Or Multiple;  Surgeon: Edwin Conde MD;  Location: [facility] OR     FISTULOTOMY RECTUM N/A 4/30/2020    Procedure: EXAM UNDER ANESTHESIA, ANUS, parital fistulotomy;  Surgeon: Valentin Sanchez MD;  Location: [facility] OR     FUSION SPINE POSTERIOR MINIMALLY INVASIVE ONE LEVEL N/A 2/23/2017    Procedure: FUSION SPINE POSTERIOR MINIMALLY INVASIVE ONE LEVEL;  L4-5 Oblique Lateral Lumbar Interbody Fusion   Epidural steroid injection.   Transpedicular Bone marrow aspiration;  Surgeon: Jeniffer Eugene MD;  Location: [facility] OR     HC EEG AWAKE AND SLEEP      abnormal     HC MRI BRAIN W/O CONTRAST  12/00    normal     HC REMOVAL GALLBLADDER  8/5/2009    [facility].     [facility] UGI ENDOSCOPY DIAG W BIOPSY  11/11/09    Normal esophagus     ORTHOPEDIC SURGERY  October 19,2011    diskectomy L4-L5     ZZ COLONOSCOPY THRU STOMA WITH BIOPSY  10/29/2003    Impression is that of normal appearing colonoscopy, without evidence of rectal bleeding.     ZZ COLONOSCOPY THRU STOMA, DIAGNOSTIC  10/00    normal     ZZ COLONOSCOPY THRU STOMA, DIAGNOSTIC  Oct 2009    Dr López- normal     [facility] UGI ENDOSCOPY, SIMPLE EXAM  7/00, 10/00    mild chronic esophagitis and duodenitis, neg H pylori     [facility]  [facility]    Esophagogastroduodenoscopy, colonoscopy with biopsies.  [facility]     [facility]  7/1/2009    [facility], [facility].     [facility] UGI ENDOSCOPY, SIMPLE EXAM  11/11/2009    attempted upper GI, pt. could not tolerate procedure:[facility]       Social History   I have reviewed this patient's social history and updated it with pertinent information if needed.  Social History     Tobacco Use     Smoking status: Current Every Day Smoker     Packs/day: 0.25     Years: 5.00     Pack years: 1.25     Types: Cigarettes     Smokeless tobacco: Never Used   Vaping Use     Vaping Use: Some days      Substances: CBD     Devices: Disposable, Pre-filled or refillable cartridge   Substance Use Topics     Alcohol use: Not Currently     Comment: Not since last July 2018     Drug use: Not Currently     Types: Marijuana     Comment: Medical Marijuana currently-- More CBD   Lives at home with 2 cats and a dog.   Hasn't smoked cigarettes in a week.   Does have medical marijuana.   No alcohol use.    Family History   I have reviewed this patient's family history and updated it with pertinent information if needed.  Family History   Problem Relation Age of Onset     Gastrointestinal Disease Brother         severe Crohn's     Neurologic Disorder Brother         Seizures post head injury     Depression Brother      Substance Abuse Brother      Genitourinary Problems Father         kidney stones     Diabetes Father      Heart Disease Father         Open heart surgery     Breast Cancer Maternal Grandmother      Parkinsonism Maternal Grandmother      Cerebrovascular Disease Paternal Grandmother      Cancer Maternal Grandfather         Lung     Cardiovascular Paternal Grandfather         Heart Attack     Substance Abuse Mother         in recovery x1 year      Lung Cancer Paternal Uncle      Cancer Paternal Uncle      Lung Cancer Maternal Uncle      Colon Cancer Maternal Uncle        Prior to Admission Medications   Prior to Admission Medications   Prescriptions Last Dose Informant Patient Reported? Taking?   ARIPiprazole (ABILIFY) 30 MG tablet  Self No No   Sig: Take 1 tablet (30 mg) by mouth At Bedtime   DULoxetine (CYMBALTA) 30 MG capsule  Self No No   Sig: Take 2 capsules (60 mg) by mouth daily   EPINEPHrine (ANY BX GENERIC EQUIV) 0.3 MG/0.3ML injection 2-pack  Self No No   Sig: Inject 0.3 mLs (0.3 mg) into the muscle once as needed for anaphylaxis   albuterol (PROAIR HFA/PROVENTIL HFA/VENTOLIN HFA) 108 (90 Base) MCG/ACT inhaler  Self No No   Sig: Inhale 2 puffs into the lungs every 6 hours as needed for shortness of  breath / dyspnea or wheezing   azelastine (OPTIVAR) 0.05 % ophthalmic solution  Self No No   Sig: Place 1 drop into both eyes 2 times daily   Patient not taking: Reported on 8/25/2021   buPROPion (WELLBUTRIN XL) 300 MG 24 hr tablet  Self No No   Sig: Take 1 tablet (300 mg) by mouth every morning   diclofenac (VOLTAREN) 1 % topical gel  Self No No   Sig: Place 2 g onto the skin 4 times daily as needed for moderate pain Stop if any gi upset or symptoms   dicyclomine (BENTYL) 20 MG tablet  Self No No   Sig: Take 1 tablet (20 mg) by mouth every 6 hours   diltiazem 2% in PLO gel  Self No No   Sig: Apply small amount to anal opening three times daily for 8 weeks.   Patient not taking: Reported on 8/25/2021   gabapentin (NEURONTIN) 300 MG capsule  Self No No   Sig: Take 1 capsule (300 mg) by mouth 3 times daily   lidocaine (XYLOCAINE) 2 % solution  Self No No   Sig: Swish and swallow 15 mLs in mouth every 4 hours as needed for other (GERD) ; Max 8 doses/24 hour period. Mix with 15 mLs Maalox or Mylanta.   medroxyPROGESTERone (DEPO-PROVERA) 150 MG/ML IM injection  Self No No   Sig: Inject 1 mL (150 mg) into the muscle every 3 months   melatonin 1 MG TABS tablet   No No   Sig: Take 1 tablet (1 mg) by mouth nightly as needed for sleep   mesalamine ER (PENTASA) 500 MG CR capsule  Self No No   Sig: Take 1-2 capsules (500-1,000 mg) by mouth 3 times daily   Patient not taking: Reported on 8/25/2021   nicotine (NICODERM CQ) 7 MG/24HR 24 hr patch   No No   Sig: Place 1 patch onto the skin daily as needed for smoking cessation   ondansetron (ZOFRAN-ODT) 4 MG ODT tab  Self No No   Sig: Take 1 tablet (4 mg) by mouth every 8 hours as needed for nausea   oxyCODONE-acetaminophen (PERCOCET) 5-325 MG tablet   No No   Sig: Take 1-2 tablets by mouth every 4 hours as needed for moderate to severe pain   pantoprazole (PROTONIX) 40 MG EC tablet  Self No No   Sig: Take 1 tablet (40 mg) by mouth 2 times daily Take 30-60 minutes before a meal.    rizatriptan (MAXALT-MLT) 5 MG ODT  Self No No   Sig: Take 1-2 tablets (5-10 mg) by mouth at onset of headache for migraine May repeat in 2 hours. Max 6 tablets/24 hours.   Patient not taking: Reported on 8/25/2021   sucralfate (CARAFATE) 1 GM tablet  Self No No   Sig: Take 1 tablet (1 g) by mouth 4 times daily as needed (heartburn)      Facility-Administered Medications: None     Allergies   Allergies   Allergen Reactions     Iohexol Hives     Other reaction(s): Hives  IV contrast; patient states she tolerates contrast if she gets benadryl before  IV contrast; patient states she tolerates contrast if she gets benadryl before     Other Environmental Allergy Rash     Plastic tape     Baclofen      hives     Bees Hives, Swelling and Difficulty breathing     Contrast Dye      Hives,   Updated 5/10/2016 CT Contrast.     Iodine Hives     Metoclopramide      Other reaction(s): Tremors  LW Reaction: shaking/sweating     Midazolam      Other reaction(s): Agitation     Monosodium Glutamate      Morphine Other (See Comments)     Difficulty with urination     Nsaids Other (See Comments)     GI BLEED x2     Other (Do Not Use) Other (See Comments)     Xanaflex- pt becomes disoriented and loses bladder control     Reglan [Metoclopramide Hcl] Other (See Comments)     shaking     Soma [Carisoprodol] Visual Disturbance     Sleep walking     Tizanidine      Topamax Other (See Comments)     Topamax [Topiramate] Nausea     Tingling  GI/Vomit     Tramadol      Severe Headache, Seizure Risk     Tylenol W/Codeine [Acetaminophen-Codeine] Nausea and Itching     Tylenol 3     Versed Other (See Comments)     Zolmitriptan      Makes face feel like its twitching     Droperidol Anxiety     Flu Virus Vaccine Rash      Arm swelling       Physical Exam   Vital Signs: Temp: 99.7  F (37.6  C) Temp src: Oral BP: 127/85 Pulse: (!) 122   Resp: 16 SpO2: 99 % O2 Device: None (Room air)    Weight: 0 lbs 0 oz    Physical Exam  General: awake, alert, in no  acute distress  HEENT: NCAT, PERRL, sclera anicteric, no nasal discharge, MMM, posterior pharynx without erythema or exudates, no cervical lymphadenopathy  CV: tachycardic, regular rhythm, no murmurs noted  Resp: CTAB, no increased WOB  Abd: Soft, tender in lower quadrants, nondistended, +BS, no rebound or guarding  MSK: No peripheral edema, extremities warm and well perfused, normal pulses  Skin: warm, dry, no jaundice, multiple small ecchymoses on arms, legs  Neuro: Deaf. No focal deficits. Alert and oriented x4.      Data   Data reviewed today: I reviewed all medications, new labs and imaging results over the last 24 hours. I personally reviewed the EKG tracing showing sinus tachycardia.    Results for orders placed or performed during the hospital encounter of 08/30/21 (from the past 24 hour(s))   Comprehensive metabolic panel   Result Value Ref Range    Sodium 140 133 - 144 mmol/L    Potassium 4.2 3.4 - 5.3 mmol/L    Chloride 110 (H) 94 - 109 mmol/L    Carbon Dioxide (CO2) 30 20 - 32 mmol/L    Anion Gap <1 (L) 3 - 14 mmol/L    Urea Nitrogen 18 7 - 30 mg/dL    Creatinine 1.18 (H) 0.52 - 1.04 mg/dL    Calcium 9.6 8.5 - 10.1 mg/dL    Glucose 65 (L) 70 - 99 mg/dL    Alkaline Phosphatase 101 40 - 150 U/L    AST 48 (H) 0 - 45 U/L    ALT 55 (H) 0 - 50 U/L    Protein Total 7.8 6.8 - 8.8 g/dL    Albumin 4.0 3.4 - 5.0 g/dL    Bilirubin Total 0.4 0.2 - 1.3 mg/dL    GFR Estimate 61 >60 mL/min/1.73m2   [place] Draw    Narrative    The following orders were created for panel order [place] Draw.  Procedure                               Abnormality         Status                     ---------                               -----------         ------                     Extra Blue Top Tube[[id number]]                                                         Extra Red Top Tube[[id number]]                               Final result               Extra Green Top (Lithium...[[id number]]                      Final result                  Please view results for these tests on the individual orders.   Extra Red Top Tube   Result Value Ref Range    Hold Specimen JIC    Extra Green Top (Lithium Heparin) Tube   Result Value Ref Range    Hold Specimen JIC    Troponin I   Result Value Ref Range    Troponin I <0.015 0.000 - 0.045 ug/L   TSH with free T4 reflex   Result Value Ref Range    TSH 0.39 (L) 0.40 - 4.00 mU/L   CK total   Result Value Ref Range     30 - 225 U/L   Lipase   Result Value Ref Range    Lipase 50 (L) 73 - 393 U/L   HCG qualitative Blood   Result Value Ref Range    hCG Serum Qualitative Negative Negative   Acetaminophen level   Result Value Ref Range    Acetaminophen <2 (L) 10 - 30 mg/L   T4 free   Result Value Ref Range    Free T4 1.09 0.76 - 1.46 ng/dL   CRP inflammation   Result Value Ref Range    CRP Inflammation 6.5 0.0 - 8.0 mg/L   CBC with platelets differential    Narrative    The following orders were created for panel order CBC with platelets differential.  Procedure                               Abnormality         Status                     ---------                               -----------         ------                     CBC with platelets and d...[[id number]]                      Final result                 Please view results for these tests on the individual orders.   Lactic acid whole blood STAT   Result Value Ref Range    Lactic Acid 1.4 0.7 - 2.0 mmol/L   CBC with platelets and differential   Result Value Ref Range    WBC Count 5.4 4.0 - 11.0 10e3/uL    RBC Count 4.30 3.80 - 5.20 10e6/uL    Hemoglobin 14.1 11.7 - 15.7 g/dL    Hematocrit 41.7 35.0 - 47.0 %    MCV 97 78 - 100 fL    MCH 32.8 26.5 - 33.0 pg    MCHC 33.8 31.5 - 36.5 g/dL    RDW 12.2 10.0 - 15.0 %    Platelet Count 283 150 - 450 10e3/uL    % Neutrophils 66 %    % Lymphocytes 22 %    % Monocytes 10 %    % Eosinophils 1 %    % Basophils 1 %    % Immature Granulocytes 0 %    NRBCs per 100 WBC 0 <1 /100    Absolute Neutrophils 3.5 1.6 - 8.3 10e3/uL     Absolute Lymphocytes 1.2 0.8 - 5.3 10e3/uL    Absolute Monocytes 0.6 0.0 - 1.3 10e3/uL    Absolute Eosinophils 0.1 0.0 - 0.7 10e3/uL    Absolute Basophils 0.0 0.0 - 0.2 10e3/uL    Absolute Immature Granulocytes 0.0 <=0.0 10e3/uL    Absolute NRBCs 0.0 10e3/uL   EKG 12 lead   Result Value Ref Range    Systolic Blood Pressure  mmHg    Diastolic Blood Pressure  mmHg    Ventricular Rate 108 BPM    Atrial Rate 108 BPM    WI Interval 112 ms    QRS Duration 78 ms     ms    QTc 439 ms    P Axis 19 degrees    R AXIS 30 degrees    T Axis 33 degrees    Interpretation ECG Sinus tachycardia  Otherwise normal ECG      Symptomatic COVID-19 Virus (Coronavirus) by PCR Nasopharyngeal    Specimen: Nasopharyngeal; Swab   Result Value Ref Range    SARS CoV2 PCR Negative Negative    Narrative    Testing was performed using the oralia  SARS-CoV-2 & Influenza A/B Assay on the oralia  [place]  System.  This test should be ordered for the detection of SARS-COV-2 in individuals who meet SARS-CoV-2 clinical and/or epidemiological criteria. Test performance is unknown in asymptomatic patients.  This test is for in vitro diagnostic use under the FDA EUA for laboratories certified under CLIA to perform moderate and/or high complexity testing. This test has not been FDA cleared or approved.  A negative test does not rule out the presence of PCR inhibitors in the specimen or target RNA in concentration below the limit of detection for the assay. The possibility of a false negative should be considered if the patient's recent exposure or clinical presentation suggests COVID-19.  [facility] are certified under the Clinical Laboratory Improvement Amendments of 1988 (CLIA-88) as qualified to perform moderate and/or high complexity laboratory testing.   XR Chest 2 Views    Narrative    EXAM: XR CHEST 2 VW  LOCATION: [facility]  DATE/TIME: 8/30/2021 10:57 PM    INDICATION: Fever.   evaluate for infiltrate  COMPARISON: Chest x-ray 5/13/2021.      Impression    IMPRESSION: Minimal change compared to previous chest x-ray. Left hemidiaphragm elevated with air distended stomach beneath diaphragm. There is linear infiltrate at right base most consistent with compressive atelectasis or scar. Can't exclude some   degree of left lower lobe pneumonia but this been minimal overall change. Left upper lobe and right lung remain clear. Heart size normal. Thoracic level stimulator leads.   CT Abdomen Pelvis w/o Contrast    Narrative    EXAM: CT ABDOMEN PELVIS W/O CONTRAST  LOCATION: [facility]  DATE/TIME: 8/30/2021 10:42 PM    INDICATION: Abdominal pain, fever  COMPARISON: CT chest, abdomen and pelvis 08/24/2021  TECHNIQUE: CT scan of the abdomen and pelvis was performed without IV contrast. Multiplanar reformats were obtained. Dose reduction techniques were used.  CONTRAST: None.    FINDINGS:   LOWER CHEST: Infiltrate or atelectasis within the visualized lingula and left lower lobe, similar to the prior study.    HEPATOBILIARY: Cholecystectomy.    PANCREAS: Normal.    SPLEEN: Normal.    ADRENAL GLANDS: Normal.    KIDNEYS/BLADDER: A few tiny nonobstructing stones both kidneys the largest measuring 2 mm.    BOWEL: No evidence for bowel obstruction. No bowel wall thickening or inflammatory changes. Normal appendix.    LYMPH NODES: Normal.    VASCULATURE: Unremarkable.    PELVIC ORGANS: Normal.    MUSCULOSKELETAL: Mechanical pump implanted in the right buttocks with epidural stimulation wires thoracic spine. L4/L5 spinal fusion.      Impression    IMPRESSION:   1.  Left basilar and lingular pneumonia or atelectasis, unchanged.  2.  Tiny nonobstructing stones both kidneys.  3.  Exam otherwise unremarkable.     UA with Microscopic reflex to Culture    Specimen: Urine, Midstream   Result Value Ref Range    Color Urine Yellow Colorless, Straw, Light Yellow, Yellow     Appearance Urine Clear Clear    Glucose Urine Negative Negative mg/dL    Bilirubin Urine Negative Negative    Ketones Urine Negative Negative mg/dL    Specific Gravity Urine 1.025 1.003 - 1.035    Blood Urine Negative Negative    pH Urine 6.0 5.0 - 7.0    Protein Albumin Urine Negative Negative mg/dL    Urobilinogen Urine Normal Normal, 2.0 mg/dL    Nitrite Urine Negative Negative    Leukocyte Esterase Urine Negative Negative    Mucus Urine Present (A) None Seen /LPF    RBC Urine 0 <=2 /HPF    WBC Urine 1 <=5 /HPF    Squamous Epithelials Urine 4 (H) <=1 /HPF    Narrative    Urine Culture not indicated   Glucose by meter   Result Value Ref Range    GLUCOSE BY METER POCT 114 (H) 70 - 99 mg/dL   Procalcitonin   Result Value Ref Range    Procalcitonin <0.05 <5.00 ng/mL     *Note: Due to a large number of results and/or encounters for the requested time period, some results have not been displayed. A complete set of results can be found in Results Review.

## 2021-08-31 NOTE — PROGRESS NOTES
Patient arrived to unit via cart with transport personnel and  Janeen. Patient asked to have more pain medication. Patient transferred to bed with SBA of writer.

## 2021-08-31 NOTE — PHARMACY-ADMISSION MEDICATION HISTORY
Admission Medication History Completed by Pharmacy    See [facility] Admission Navigator for allergy information, preferred outpatient pharmacy, prior to admission medications and immunization status.     Medication History Sources:     Patient Katy Islas    Dispense History    Changes made to PTA medication list (reason):    Added: cyclobenzaprine  furosemide  metoprolol  buspirone  clonazepam    Deleted: diclofenac  diltiazem gel  melatonin  nicotine patch  percocet    Changed: None    Additional Information:    This prior to admission medication list is accurate and complete to the best of my knowledge based off of the available information at this time.     Prior to Admission medications    Medication Sig Last Dose Taking? Auth Provider   albuterol (PROAIR HFA/PROVENTIL HFA/VENTOLIN HFA) 108 (90 Base) MCG/ACT inhaler Inhale 2 puffs into the lungs every 6 hours as needed for shortness of breath / dyspnea or wheezing Past Week at Unknown time Yes Aura Rosario PA-C   ARIPiprazole (ABILIFY) 30 MG tablet Take 1 tablet (30 mg) by mouth At Bedtime 8/29/2021 Yes Aura Rosario PA-C   buPROPion (WELLBUTRIN XL) 300 MG 24 hr tablet Take 1 tablet (300 mg) by mouth every morning 8/29/2021 Yes Aura Rosario PA-C   busPIRone (BUSPAR) 15 MG tablet Take 15 mg by mouth 3 times daily 8/29/2021 Yes Unknown, Entered By History   clonazePAM (KLONOPIN) 1 MG tablet Take 1 mg by mouth 2 times daily as needed for anxiety  Yes Unknown, Entered By History   cyclobenzaprine (FLEXERIL) 10 MG tablet Take 10 mg by mouth 3 times daily as needed 8/29/2021 Yes Unknown, Entered By History   dicyclomine (BENTYL) 20 MG tablet Take 1 tablet (20 mg) by mouth every 6 hours 8/29/2021 Yes Aura Rosario PA-C   DULoxetine (CYMBALTA) 30 MG capsule Take 2 capsules (60 mg) by mouth daily 8/29/2021 Yes Aura Rosario PA-C   furosemide (LASIX) 20 MG tablet Take 20  mg by mouth daily 8/29/2021 Yes Unknown, Entered By History   gabapentin (NEURONTIN) 300 MG capsule Take 1 capsule (300 mg) by mouth 3 times daily 8/29/2021 Yes Aura Rosario PA-C   metoprolol succinate ER (TOPROL-XL) 50 MG 24 hr tablet Take 50 mg by mouth daily 8/29/2021 Yes Unknown, Entered By History   pantoprazole (PROTONIX) 40 MG EC tablet Take 1 tablet (40 mg) by mouth 2 times daily Take 30-60 minutes before a meal. 8/29/2021 Yes Aura Rosario PA-C   sucralfate (CARAFATE) 1 GM tablet Take 1 tablet (1 g) by mouth 4 times daily as needed (heartburn) 8/29/2021 Yes Aura Rosario PA-C   azelastine (OPTIVAR) 0.05 % ophthalmic solution Place 1 drop into both eyes 2 times daily  Patient not taking: Reported on 8/25/2021   Aura Rosario PA-C   EPINEPHrine (ANY BX GENERIC EQUIV) 0.3 MG/0.3ML injection 2-pack Inject 0.3 mLs (0.3 mg) into the muscle once as needed for anaphylaxis   Aura Rosario PA-C   lidocaine (XYLOCAINE) 2 % solution Swish and swallow 15 mLs in mouth every 4 hours as needed for other (GERD) ; Max 8 doses/24 hour period. Mix with 15 mLs Maalox or Mylanta.   Aura Rosario PA-C   medroxyPROGESTERone (DEPO-PROVERA) 150 MG/ML IM injection Inject 1 mL (150 mg) into the muscle every 3 months   Aura Rosario PA-C   mesalamine ER (PENTASA) 500 MG CR capsule Take 1-2 capsules (500-1,000 mg) by mouth 3 times daily  Patient not taking: Reported on 8/25/2021   Stone Antoine MD   ondansetron (ZOFRAN-ODT) 4 MG ODT tab Take 1 tablet (4 mg) by mouth every 8 hours as needed for nausea   Aura Rosario PA-C   rizatriptan (MAXALT-MLT) 5 MG ODT Take 1-2 tablets (5-10 mg) by mouth at onset of headache for migraine May repeat in 2 hours. Max 6 tablets/24 hours.  Patient not taking: Reported on 8/25/2021   Aura Rosario PA-C       Date completed:  08/31/21    Medication history completed by: Riley Enrique RPH

## 2021-08-31 NOTE — PROGRESS NOTES
Seen and evaluated in ED>   Overall feeling better however still w/ gen bodyache, malaise, low abdominal pain, reports 1 bloody bm yesterday, afebrile. Nausea+.      Vitals:    08/31/21 0751 08/31/21 0800 08/31/21 0815 08/31/21 1239   BP:  110/84 110/84 113/74   Pulse:  85 69 95   Resp:  14 26 18   Temp:  97.7  F (36.5  C)     TempSrc:       SpO2:  97% 100% 94%   Weight: 84.2 kg (185 lb 10 oz)        See Dr Ventura's HPI from earlier today for A & P.     Hgb 11.0, follow-up stable. No e.o active bleeding. Follow-up Hgb Q 12.   Added compazine prn for nausea.   No other changes made.     dw [facility] triage. No bed available.   Will admit to [facility], consult/curbside GI as needed.     Oneal Winchester MD  [facility]  Contact information available via [facility] Paging/Directory

## 2021-08-31 NOTE — ED PROVIDER NOTES
ED Provider Note  [facility]      History     Chief Complaint   Patient presents with     Generalized Body Aches     Generalized bodyaches. States she can barely walk. Has been getting worse since this  morning. She felt like she was going to fall down this moring.      HPI  Katy Islas is a 31 year old female with history of chronic back pain s/p lumbar fusion, fibromyalgia,  Crohn's disease, HTN, bipolar disorder presenting to the ED with complaint of generalized body aches.     Patient was recently admitted to [facility] 8/24-8/29 for pain management of acute on chronic back pain. Her admission workup was significant for known Chronic spinal disease with well positioned hardware in the lumbar and thoracic spine along with a spinal stimulator seen on MRI of the lumbar spine 8/28. Patient recently had a ct abdomen, 8/24 that was unremarkable, lipase and urinalysis were also normal. Her pain responded well to conservative measures with therapy and better pain management. Per ot/pt request patient was considered a rehab candidate. On PM & R was consulted on 8/27 and deemed the patient a better candidate for skilled placement for rehab. These options were presented to the patient and she refused skilled placement options and rather have outpatient care under a pain specialist with services come to her home as she has cats to take care of. Patient discharged in stable condition.     Patient called 911 upon arrival home after discharge yesterday complaining of back pain and rectal bleeding. She was taken to [facility] where she was given Toradol and Zofran and left before MD could give verbal discharge instructions.     Last night, the patient states she developed a sense that her heart was pounding and beating fast.  She also developed diffuse myalgias as well as diffuse abdominal pain.  She has had some nausea but denies vomiting.  She did have some rectal bleeding after a hard  bowel movement last night before she went to [facility].  She denies any chest pain.  No cough.  She denies dyspnea.    Past Medical History  Past Medical History:   Diagnosis Date     Abdominal pain 10/31- 11/4/2005    [facility] admit for Crohn's     Allergic rhinitis, cause unspecified     Allergic rhinitis     Arthritis      C. difficile diarrhea     Past, no current diarrhea.     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, without mention of intractable epilepsy     pseudoseizures diagnosed after extensive neurologic eval     Migraine 07/21/12    D/C 07/22/12-[facility]     Migraine, unspecified, without mention of intractable migraine without mention of status migrainosus     Migraine     Mild intermittent asthma     mild intermittent     Mycoplasma infection in conditions classified elsewhere and of unspecified site      Other chronic pain     Back pain for 6 years     Renal disease     Kidney stones     Unspecified hearing loss     congenital hearing loss     Past Surgical History:   Procedure Laterality Date     AS REMOVAL OF COCCYX  10/18/2017     C FUSION OF SACROILIAC JOINT  10/26/2018     COLONOSCOPY  7/1/2009    [facility].     COLONOSCOPY N/A 7/17/2019    Procedure: Colonoscopy, With Polypectomy And Biopsy;  Surgeon: Edwin Conde MD;  Location: [facility] OR     COLONOSCOPY WITH CO2 INSUFFLATION N/A 7/17/2019    Procedure: COLONOSCOPY, WITH CO2 INSUFFLATION;  Surgeon: Amaya  Edwin German MD;  Location: [facility] OR     COMBINED ESOPHAGOSCOPY, GASTROSCOPY, DUODENOSCOPY (EGD) WITH CO2 INSUFFLATION N/A 4/12/2019    Procedure: COMBINED ESOPHAGOSCOPY, GASTROSCOPY, DUODENOSCOPY (EGD) WITH CO2 INSUFFLATION;  Surgeon: Edwni Conde MD;  Location: [facility] OR     ESOPHAGOSCOPY, GASTROSCOPY, DUODENOSCOPY (EGD), COMBINED N/A 4/12/2019    Procedure: Combined Esophagoscopy, Gastroscopy, Duodenoscopy (Egd), Biopsy Single Or Multiple;  Surgeon: Edwin Conde MD;  Location: [facility] OR     FISTULOTOMY RECTUM N/A 4/30/2020    Procedure: EXAM UNDER ANESTHESIA, ANUS, parital fistulotomy;  Surgeon: Valentin Sanchez MD;  Location: [facility] OR     FUSION SPINE POSTERIOR MINIMALLY INVASIVE ONE LEVEL N/A 2/23/2017    Procedure: FUSION SPINE POSTERIOR MINIMALLY INVASIVE ONE LEVEL;  L4-5 Oblique Lateral Lumbar Interbody Fusion   Epidural steroid injection.   Transpedicular Bone marrow aspiration;  Surgeon: Jeniffer Eugene MD;  Location: [facility] OR     HC EEG AWAKE AND SLEEP      abnormal     HC MRI BRAIN W/O CONTRAST  12/00    normal     HC REMOVAL GALLBLADDER  8/5/2009    [facility], [facility].     HC UGI ENDOSCOPY DIAG W BIOPSY  11/11/09    Normal esophagus     ORTHOPEDIC SURGERY  October 19,2011    diskectomy L4-L5     ZZ COLONOSCOPY THRU STOMA WITH BIOPSY  10/29/2003    Impression is that of normal appearing colonoscopy, without evidence of rectal bleeding.     ZZ COLONOSCOPY THRU STOMA, DIAGNOSTIC  10/00    normal     ZZ COLONOSCOPY THRU STOMA, DIAGNOSTIC  Oct 2009    Dr López- normal     [facility] UGI ENDOSCOPY, SIMPLE EXAM  7/00, 10/00    mild chronic esophagitis and duodenitis, neg H pylori     [facility] UGI ENDOSCOPY, SIMPLE EXAM  01/20/2005    Esophagogastroduodenoscopy, colonoscopy with biopsies.  [facility] UGI ENDOSCOPY, SIMPLE EXAM  7/1/2009    [facility], [facility].     [facility] UGI ENDOSCOPY, SIMPLE EXAM  11/11/2009    attempted upper GI, pt.  could not tolerate procedure:[facility]     albuterol (PROAIR HFA/PROVENTIL HFA/VENTOLIN HFA) 108 (90 Base) MCG/ACT inhaler  ARIPiprazole (ABILIFY) 30 MG tablet  azelastine (OPTIVAR) 0.05 % ophthalmic solution  buPROPion (WELLBUTRIN XL) 300 MG 24 hr tablet  diclofenac (VOLTAREN) 1 % topical gel  dicyclomine (BENTYL) 20 MG tablet  diltiazem 2% in PLO gel  DULoxetine (CYMBALTA) 30 MG capsule  EPINEPHrine (ANY BX GENERIC EQUIV) 0.3 MG/0.3ML injection 2-pack  gabapentin (NEURONTIN) 300 MG capsule  lidocaine (XYLOCAINE) 2 % solution  medroxyPROGESTERone (DEPO-PROVERA) 150 MG/ML IM injection  melatonin 1 MG TABS tablet  mesalamine ER (PENTASA) 500 MG CR capsule  nicotine (NICODERM CQ) 7 MG/24HR 24 hr patch  ondansetron (ZOFRAN-ODT) 4 MG ODT tab  oxyCODONE-acetaminophen (PERCOCET) 5-325 MG tablet  pantoprazole (PROTONIX) 40 MG EC tablet  rizatriptan (MAXALT-MLT) 5 MG ODT  sucralfate (CARAFATE) 1 GM tablet      Allergies   Allergen Reactions     Iohexol Hives     Other reaction(s): Hives  IV contrast; patient states she tolerates contrast if she gets benadryl before  IV contrast; patient states she tolerates contrast if she gets benadryl before     Other Environmental Allergy Rash     Plastic tape     Baclofen      hives     Bees Hives, Swelling and Difficulty breathing     Contrast Dye      Hives,   Updated 5/10/2016 CT Contrast.     Iodine Hives     Metoclopramide      Other reaction(s): Tremors  LW Reaction: shaking/sweating     Midazolam      Other reaction(s): Agitation     Monosodium Glutamate      Morphine Other (See Comments)     Difficulty with urination     Nsaids Other (See Comments)     GI BLEED x2     Other (Do Not Use) Other (See Comments)     Xanaflex- pt becomes disoriented and loses bladder control     Reglan [Metoclopramide Hcl] Other (See Comments)     shaking     Soma [Carisoprodol] Visual Disturbance     Sleep walking     Tizanidine      Topamax Other (See Comments)     Topamax [Topiramate]  Nausea     Tingling  GI/Vomit     Tramadol      Severe Headache, Seizure Risk     Tylenol W/Codeine [Acetaminophen-Codeine] Nausea and Itching     Tylenol 3     Versed Other (See Comments)     Zolmitriptan      Makes face feel like its twitching     Droperidol Anxiety     Flu Virus Vaccine Rash      Arm swelling     Family History  Family History   Problem Relation Age of Onset     Gastrointestinal Disease Brother         severe Crohn's     Neurologic Disorder Brother         Seizures post head injury     Depression Brother      Substance Abuse Brother      Genitourinary Problems Father         kidney stones     Diabetes Father      Heart Disease Father         Open heart surgery     Breast Cancer Maternal Grandmother      Parkinsonism Maternal Grandmother      Cerebrovascular Disease Paternal Grandmother      Cancer Maternal Grandfather         Lung     Cardiovascular Paternal Grandfather         Heart Attack     Substance Abuse Mother         in recovery x1 year      Lung Cancer Paternal Uncle      Cancer Paternal Uncle      Lung Cancer Maternal Uncle      Colon Cancer Maternal Uncle      Social History   Social History     Tobacco Use     Smoking status: Current Every Day Smoker     Packs/day: 0.25     Years: 5.00     Pack years: 1.25     Types: Cigarettes     Smokeless tobacco: Never Used   Vaping Use     Vaping Use: Some days     Substances: CBD     Devices: Disposable, Pre-filled or refillable cartridge   Substance Use Topics     Alcohol use: Not Currently     Comment: Not since last July 2018     Drug use: Not Currently     Types: Marijuana     Comment: Medical Marijuana currently-- More CBD      Past medical history, past surgical history, medications, allergies, family history, and social history were reviewed with the patient. No additional pertinent items.       Review of Systems   Constitutional: Negative for fever.   HENT: Negative for sore throat.    Eyes: Negative for redness.   Respiratory: Negative  for cough and shortness of breath.    Cardiovascular: Negative for chest pain.   Gastrointestinal: Positive for abdominal pain, constipation and nausea. Negative for vomiting.   Genitourinary: Negative for difficulty urinating and dysuria.   Musculoskeletal: Positive for myalgias. Negative for back pain and neck pain.   Skin: Negative for rash.   Neurological: Negative for headaches.   Psychiatric/Behavioral: Negative for sleep disturbance.   All other systems reviewed and are negative.    A complete review of systems was performed with pertinent positives and negatives noted in the HPI, and all other systems negative.    Physical Exam   BP: 139/89  Pulse: (!) 139  Temp: 99.9  F (37.7  C)  Resp: 16  SpO2: 100 %  Physical Exam  Vitals and nursing note reviewed.   Constitutional:       General: She is not in acute distress.     Appearance: Normal appearance. She is not diaphoretic.   HENT:      Head: Normocephalic and atraumatic.      Mouth/Throat:      Pharynx: No oropharyngeal exudate.   Eyes:      General: No scleral icterus.     Pupils: Pupils are equal, round, and reactive to light.   Cardiovascular:      Rate and Rhythm: Regular rhythm. Tachycardia present.      Heart sounds: Normal heart sounds.   Pulmonary:      Effort: No respiratory distress.      Breath sounds: Normal breath sounds.   Abdominal:      General: Bowel sounds are normal.      Palpations: Abdomen is soft.      Tenderness: There is generalized abdominal tenderness.   Musculoskeletal:         General: No tenderness. Normal range of motion.   Skin:     General: Skin is warm and dry.      Findings: No rash.   Neurological:      General: No focal deficit present.      Mental Status: She is alert.      Motor: No weakness.      Coordination: Coordination normal.         ED Course      Procedures            EKG Interpretation:      Interpreted by LUCIANA MCBRIDE MD, MD  Time reviewed: 2117  Symptoms at time of EKG: Palpitations  Rhythm: sinus  tachycardia  Rate: 108  Axis: Normal  Ectopy: none  Conduction: normal  ST Segments/ T Waves: No ST-T wave changes  Q Waves: none  Comparison to prior: No old EKG available    Clinical Impression: sinus tachycardia        The medical record was reviewed and interpreted.  Current labs reviewed and interpreted.  Previous labs reviewed and interpreted.  Current images reviewed and interpreted: No acute intra-abdominal process.  No infiltrate..  EKG reviewed and interpreted: Sinus tachycardia without acute ischemic change.     Results for orders placed or performed during the hospital encounter of 08/30/21   XR Chest 2 Views     Status: None    Narrative    EXAM: XR CHEST 2 VW  LOCATION: [facility]  DATE/TIME: 8/30/2021 10:57 PM    INDICATION: Fever.  evaluate for infiltrate  COMPARISON: Chest x-ray 5/13/2021.      Impression    IMPRESSION: Minimal change compared to previous chest x-ray. Left hemidiaphragm elevated with air distended stomach beneath diaphragm. There is linear infiltrate at right base most consistent with compressive atelectasis or scar. Can't exclude some   degree of left lower lobe pneumonia but this been minimal overall change. Left upper lobe and right lung remain clear. Heart size normal. Thoracic level stimulator leads.   CT Abdomen Pelvis w/o Contrast     Status: None    Narrative    EXAM: CT ABDOMEN PELVIS W/O CONTRAST  LOCATION: [facility]  DATE/TIME: 8/30/2021 10:42 PM    INDICATION: Abdominal pain, fever  COMPARISON: CT chest, abdomen and pelvis 08/24/2021  TECHNIQUE: CT scan of the abdomen and pelvis was performed without IV contrast. Multiplanar reformats were obtained. Dose reduction techniques were used.  CONTRAST: None.    FINDINGS:   LOWER CHEST: Infiltrate or atelectasis within the visualized lingula and left lower lobe, similar to the prior study.    HEPATOBILIARY: Cholecystectomy.    PANCREAS:  Normal.    SPLEEN: Normal.    ADRENAL GLANDS: Normal.    KIDNEYS/BLADDER: A few tiny nonobstructing stones both kidneys the largest measuring 2 mm.    BOWEL: No evidence for bowel obstruction. No bowel wall thickening or inflammatory changes. Normal appendix.    LYMPH NODES: Normal.    VASCULATURE: Unremarkable.    PELVIC ORGANS: Normal.    MUSCULOSKELETAL: Mechanical pump implanted in the right buttocks with epidural stimulation wires thoracic spine. L4/L5 spinal fusion.      Impression    IMPRESSION:   1.  Left basilar and lingular pneumonia or atelectasis, unchanged.  2.  Tiny nonobstructing stones both kidneys.  3.  Exam otherwise unremarkable.     Comprehensive metabolic panel     Status: Abnormal   Result Value Ref Range    Sodium 140 133 - 144 mmol/L    Potassium 4.2 3.4 - 5.3 mmol/L    Chloride 110 (H) 94 - 109 mmol/L    Carbon Dioxide (CO2) 30 20 - 32 mmol/L    Anion Gap <1 (L) 3 - 14 mmol/L    Urea Nitrogen 18 7 - 30 mg/dL    Creatinine 1.18 (H) 0.52 - 1.04 mg/dL    Calcium 9.6 8.5 - 10.1 mg/dL    Glucose 65 (L) 70 - 99 mg/dL    Alkaline Phosphatase 101 40 - 150 U/L    AST 48 (H) 0 - 45 U/L    ALT 55 (H) 0 - 50 U/L    Protein Total 7.8 6.8 - 8.8 g/dL    Albumin 4.0 3.4 - 5.0 g/dL    Bilirubin Total 0.4 0.2 - 1.3 mg/dL    GFR Estimate 61 >60 mL/min/1.73m2   CBC with platelets differential     Status: None    Narrative    The following orders were created for panel order CBC with platelets differential.  Procedure                               Abnormality         Status                     ---------                               -----------         ------                     CBC with platelets and d...[[id number]]                      Final result                 Please view results for these tests on the individual orders.   Lactic acid whole blood STAT     Status: Normal   Result Value Ref Range    Lactic Acid 1.4 0.7 - 2.0 mmol/L   CBC with platelets and differential     Status: None   Result Value Ref  Range    WBC Count 5.4 4.0 - 11.0 10e3/uL    RBC Count 4.30 3.80 - 5.20 10e6/uL    Hemoglobin 14.1 11.7 - 15.7 g/dL    Hematocrit 41.7 35.0 - 47.0 %    MCV 97 78 - 100 fL    MCH 32.8 26.5 - 33.0 pg    MCHC 33.8 31.5 - 36.5 g/dL    RDW 12.2 10.0 - 15.0 %    Platelet Count 283 150 - 450 10e3/uL    % Neutrophils 66 %    % Lymphocytes 22 %    % Monocytes 10 %    % Eosinophils 1 %    % Basophils 1 %    % Immature Granulocytes 0 %    NRBCs per 100 WBC 0 <1 /100    Absolute Neutrophils 3.5 1.6 - 8.3 10e3/uL    Absolute Lymphocytes 1.2 0.8 - 5.3 10e3/uL    Absolute Monocytes 0.6 0.0 - 1.3 10e3/uL    Absolute Eosinophils 0.1 0.0 - 0.7 10e3/uL    Absolute Basophils 0.0 0.0 - 0.2 10e3/uL    Absolute Immature Granulocytes 0.0 <=0.0 10e3/uL    Absolute NRBCs 0.0 10e3/uL   Extra Red Top Tube     Status: None   Result Value Ref Range    Hold Specimen JIC    Extra Green Top (Lithium Heparin) Tube     Status: None   Result Value Ref Range    Hold Specimen JIC    Troponin I     Status: Normal   Result Value Ref Range    Troponin I <0.015 0.000 - 0.045 ug/L   TSH with free T4 reflex     Status: Abnormal   Result Value Ref Range    TSH 0.39 (L) 0.40 - 4.00 mU/L   CK total     Status: Normal   Result Value Ref Range     30 - 225 U/L   Lipase     Status: Abnormal   Result Value Ref Range    Lipase 50 (L) 73 - 393 U/L   HCG qualitative Blood     Status: Normal   Result Value Ref Range    hCG Serum Qualitative Negative Negative   Symptomatic COVID-19 Virus (Coronavirus) by PCR Nasopharyngeal     Status: Normal    Specimen: Nasopharyngeal; Swab   Result Value Ref Range    SARS CoV2 PCR Negative Negative    Narrative    Testing was performed using the oralia  SARS-CoV-2 & Influenza A/B Assay on the oralia  [place]  System.  This test should be ordered for the detection of SARS-COV-2 in individuals who meet SARS-CoV-2 clinical and/or epidemiological criteria. Test performance is unknown in asymptomatic patients.  This test is for in vitro  diagnostic use under the FDA EUA for laboratories certified under CLIA to perform moderate and/or high complexity testing. This test has not been FDA cleared or approved.  A negative test does not rule out the presence of PCR inhibitors in the specimen or target RNA in concentration below the limit of detection for the assay. The possibility of a false negative should be considered if the patient's recent exposure or clinical presentation suggests COVID-19.  [facility] are certified under the Clinical Laboratory Improvement Amendments of 1988 (CLIA-88) as qualified to perform moderate and/or high complexity laboratory testing.   Acetaminophen level     Status: Abnormal   Result Value Ref Range    Acetaminophen <2 (L) 10 - 30 mg/L   T4 free     Status: Normal   Result Value Ref Range    Free T4 1.09 0.76 - 1.46 ng/dL   Glucose by meter     Status: Abnormal   Result Value Ref Range    GLUCOSE BY METER POCT 114 (H) 70 - 99 mg/dL   CRP inflammation     Status: Normal   Result Value Ref Range    CRP Inflammation 6.5 0.0 - 8.0 mg/L   EKG 12 lead     Status: None (Preliminary result)   Result Value Ref Range    Systolic Blood Pressure  mmHg    Diastolic Blood Pressure  mmHg    Ventricular Rate 108 BPM    Atrial Rate 108 BPM    VA Interval 112 ms    QRS Duration 78 ms     ms    QTc 439 ms    P Axis 19 degrees    R AXIS 30 degrees    T Axis 33 degrees    Interpretation ECG Sinus tachycardia  Otherwise normal ECG      [place] Draw     Status: None (In process)    Narrative    The following orders were created for panel order [place] Draw.  Procedure                               Abnormality         Status                     ---------                               -----------         ------                     Extra Blue Top Tube[[id number]]                                                         Extra Red Top Tube[[id number]]                               Final result               Extra Green Top  (Lithium...[[id number]]                      Final result                 Please view results for these tests on the individual orders.     Medications   sodium chloride (PF) 0.9% PF flush 3 mL (has no administration in time range)   sodium chloride (PF) 0.9% PF flush 3 mL ( Intravenous Canceled Entry 8/30/21 2207)   0.9% sodium chloride BOLUS ( Intravenous Canceled Entry 8/30/21 2207)   HYDROmorphone (PF) (DILAUDID) injection 0.5 mg (0.5 mg Intravenous Given 8/31/21 0039)   0.9% sodium chloride BOLUS (1,000 mLs Intravenous New Bag 8/30/21 2155)   HYDROmorphone (PF) (DILAUDID) injection 0.5 mg (0.5 mg Intravenous Given 8/30/21 2149)   ondansetron (ZOFRAN) injection 4 mg (4 mg Intravenous Given 8/30/21 2148)   diphenhydrAMINE (BENADRYL) capsule 50 mg (50 mg Oral Given 8/30/21 2150)   HYDROmorphone (PF) (DILAUDID) injection 0.5 mg (0.5 mg Intravenous Given 8/30/21 2230)        Assessments & Plan (with Medical Decision Making)   31 year old female with history of Crohn's disease and chronic back pain as well as bipolar disorder and fibromyalgia to the emergency department for myalgias and tachycardia.  Her symptoms began yesterday.  She was discharged from the hospital yesterday as well after she was admitted for an exacerbation of her low back pain.  In the emergency department, the patient's temperature is 99.9.  She is initially tachycardic to approximately 140.  EKG reveals sinus tachycardia.  The patient's lactate level and white blood cell count are normal.  TSH low but T4 is normal so do not suspect hyperthyroidism.  Patient does have diffuse abdominal pain and tenderness on exam but no acute intra-abdominal pathology on CT.  Blood cultures have been obtained.  Covid testing is negative.  Urinalysis is pending.  Patient will be admitted to the hospitalist service for further evaluation and management of tachycardia and myalgias with concern for infectious type picture but no source identified thus far.    I have  reviewed the nursing notes. I have reviewed the findings, diagnosis, plan and need for follow up with the patient.    New Prescriptions    No medications on file       Final diagnoses:   Fever, unspecified fever cause   Acute kidney injury (H)   Sinus tachycardia   Myalgia     Chart documentation was completed with Dragon voice-recognition software. Even though reviewed, this chart may still contain some grammatical, spelling, and word errors.     --  Kareem Zarate Md  [facility] EMERGENCY DEPARTMENT  8/30/2021     Kareem Zarate MD  08/31/21 0140

## 2021-08-31 NOTE — ED NOTES
[facility]   ED Nurse to Floor Handoff   Katy Islas is a 31 year old female who speaks American Sign Language and lives alone,  home status is unknown  They arrived in the ED by unknown from home    ED Chief Complaint: Generalized Body Aches (Generalized bodyaches. States she can barely walk. Has been getting worse since this  morning. She felt like she was going to fall down this moring. )    ED Dx;   Final diagnoses:   Fever, unspecified fever cause   Acute kidney injury (H)   Sinus tachycardia   Myalgia         Needed?: Yes    Allergies:   Allergies   Allergen Reactions     Iohexol Hives     Other reaction(s): Hives  IV contrast; patient states she tolerates contrast if she gets benadryl before  IV contrast; patient states she tolerates contrast if she gets benadryl before     Other Environmental Allergy Rash     Plastic tape     Baclofen      hives     Bees Hives, Swelling and Difficulty breathing     Contrast Dye      Hives,   Updated 5/10/2016 CT Contrast.     Iodine Hives     Metoclopramide      Other reaction(s): Tremors  LW Reaction: shaking/sweating     Midazolam      Other reaction(s): Agitation     Monosodium Glutamate      Morphine Other (See Comments)     Difficulty with urination     Nsaids Other (See Comments)     GI BLEED x2     Other (Do Not Use) Other (See Comments)     Xanaflex- pt becomes disoriented and loses bladder control     Reglan [Metoclopramide Hcl] Other (See Comments)     shaking     Soma [Carisoprodol] Visual Disturbance     Sleep walking     Tizanidine      Topamax Other (See Comments)     Topamax [Topiramate] Nausea     Tingling  GI/Vomit     Tramadol      Severe Headache, Seizure Risk     Tylenol W/Codeine [Acetaminophen-Codeine] Nausea and Itching     Tylenol 3     Versed Other (See Comments)     Zolmitriptan      Makes face feel like its twitching     Droperidol Anxiety     Flu Virus Vaccine Rash      Arm swelling    .  Past Medical Hx:   Past Medical History:   Diagnosis Date     Abdominal pain 10/31- 11/4/2005    [facility]     Allergic rhinitis, cause unspecified     Allergic rhinitis     Arthritis      C. difficile diarrhea     Past, no current diarrhea.     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     Migraine 07/21/12    D/C 07/22/12-[facility]     Migraine, unspecified, without mention of intractable migraine without mention of status migrainosus     Migraine     Mild intermittent asthma     mild intermittent     Mycoplasma infection in conditions classified elsewhere and of unspecified site      Other chronic pain     Back pain for 6 years     Renal disease     Kidney stones     Unspecified hearing loss     congenital hearing loss      Baseline Mental status: WDL  Current Mental Status changes: at basesline    Infection present or suspected this encounter: no  Sepsis suspected: No  Isolation type: No active isolations  Patient tested for COVID 19 prior to admission: YES     Activity level - Baseline/Home:  Independent  Activity Level - Current:   Stand with Assist    Bariatric equipment needed?: No    In the ED these meds were given:   Medications   sodium chloride (PF) 0.9% PF flush 3 mL (has no administration in time range)   sodium chloride (PF) 0.9% PF flush 3 mL ( Intravenous Canceled Entry 8/31/21 8758)   0.9%  sodium chloride BOLUS ( Intravenous Canceled Entry 8/30/21 2207)   albuterol (PROAIR HFA/PROVENTIL HFA/VENTOLIN HFA) 108 (90 Base) MCG/ACT inhaler 2 puff (has no administration in time range)   ARIPiprazole (ABILIFY) tablet 30 mg (30 mg Oral Not Given 8/31/21 0418)   buPROPion (WELLBUTRIN XL) 24 hr tablet 300 mg (300 mg Oral Given 8/31/21 0813)   dicyclomine (BENTYL) tablet 20 mg (20 mg Oral Given 8/31/21 1253)   DULoxetine (CYMBALTA) DR capsule 60 mg (60 mg Oral Given 8/31/21 0813)   gabapentin (NEURONTIN) capsule 300 mg (300 mg Oral Given 8/31/21 1346)   mesalamine ER (PENTASA) CR capsule 500-1,000 mg (500 mg Oral Given 8/31/21 1346)   ondansetron (ZOFRAN-ODT) ODT tab 4 mg (4 mg Oral Given 8/31/21 0922)   pantoprazole (PROTONIX) EC tablet 40 mg (40 mg Oral Given 8/31/21 0813)   sucralfate (CARAFATE) tablet 1 g (has no administration in time range)   acetaminophen (TYLENOL) tablet 650 mg (650 mg Oral Given 8/31/21 1346)   lidocaine 1 % 0.1-1 mL (has no administration in time range)   lidocaine (LMX4) cream (has no administration in time range)   sodium chloride (PF) 0.9% PF flush 3 mL ( Intracatheter Canceled Entry 8/31/21 0418)   sodium chloride (PF) 0.9% PF flush 3 mL (has no administration in time range)   melatonin tablet 1 mg (has no administration in time range)   polyethylene glycol (MIRALAX) Packet 17 g (17 g Oral Given 8/31/21 0813)   HYDROmorphone (PF) (DILAUDID) injection 0.5 mg (0.5 mg Intravenous Given 8/31/21 0448)   diphenhydrAMINE (BENADRYL) capsule 25-50 mg (has no administration in time range)   HYDROmorphone (DILAUDID) tablet 2 mg (has no administration in time range)   prochlorperazine (COMPAZINE) injection 5 mg (has no administration in time range)   0.9% sodium chloride BOLUS (0 mLs Intravenous Stopped 8/31/21 0210)   HYDROmorphone (PF) (DILAUDID) injection 0.5 mg (0.5 mg Intravenous Given 8/30/21 2149)   ondansetron (ZOFRAN) injection 4 mg (4 mg Intravenous Given 8/30/21 2148)    diphenhydrAMINE (BENADRYL) capsule 50 mg (50 mg Oral Given 8/30/21 2150)   HYDROmorphone (PF) (DILAUDID) injection 0.5 mg (0.5 mg Intravenous Given 8/30/21 2230)   methocarbamol (ROBAXIN) tablet 500 mg (500 mg Oral Given 8/31/21 0210)   ondansetron (ZOFRAN) injection 4 mg (4 mg Intravenous Given 8/31/21 0317)   diphenhydrAMINE (BENADRYL) injection 25 mg (25 mg Intravenous Given 8/31/21 0515)       Drips running?  No    Home pump  No    Current LDAs  Peripheral IV 08/30/21 Anterior;Right Upper forearm (Active)   Number of days: 1       Labs results:   Labs Ordered and Resulted from Time of ED Arrival Up to the Time of Departure from the ED   COMPREHENSIVE METABOLIC PANEL - Abnormal; Notable for the following components:       Result Value    Chloride 110 (*)     Anion Gap <1 (*)     Creatinine 1.18 (*)     Glucose 65 (*)     AST 48 (*)     ALT 55 (*)     All other components within normal limits   TSH WITH FREE T4 REFLEX - Abnormal; Notable for the following components:    TSH 0.39 (*)     All other components within normal limits   LIPASE - Abnormal; Notable for the following components:    Lipase 50 (*)     All other components within normal limits   ROUTINE UA WITH MICROSCOPIC REFLEX TO CULTURE - Abnormal; Notable for the following components:    Mucus Urine Present (*)     Squamous Epithelials Urine 4 (*)     All other components within normal limits    Narrative:     Urine Culture not indicated   ACETAMINOPHEN LEVEL - Abnormal; Notable for the following components:    Acetaminophen <2 (*)     All other components within normal limits   GLUCOSE BY METER - Abnormal; Notable for the following components:    GLUCOSE BY METER POCT 114 (*)     All other components within normal limits   CBC WITH PLATELETS - Abnormal; Notable for the following components:    RBC Count 3.37 (*)     Hemoglobin 11.0 (*)     Hematocrit 32.9 (*)     All other components within normal limits   COMPREHENSIVE METABOLIC PANEL - Abnormal;  Notable for the following components:    Calcium 8.1 (*)     AST 50 (*)     ALT 54 (*)     Protein Total 6.3 (*)     All other components within normal limits   CBC WITH PLATELETS AND DIFFERENTIAL - Abnormal; Notable for the following components:    WBC Count 3.6 (*)     RBC Count 3.28 (*)     Hemoglobin 11.0 (*)     Hematocrit 32.4 (*)     MCH 33.5 (*)     All other components within normal limits   LACTIC ACID WHOLE BLOOD - Normal   TROPONIN I - Normal   CK TOTAL - Normal   HCG QUALITATIVE PREGNANCY - Normal   COVID-19 VIRUS (CORONAVIRUS) BY PCR - Normal    Narrative:     Testing was performed using the oralia  SARS-CoV-2 & Influenza A/B Assay on the oralia  [place]  System.  This test should be ordered for the detection of SARS-COV-2 in individuals who meet SARS-CoV-2 clinical and/or epidemiological criteria. Test performance is unknown in asymptomatic patients.  This test is for in vitro diagnostic use under the FDA EUA for laboratories certified under CLIA to perform moderate and/or high complexity testing. This test has not been FDA cleared or approved.  A negative test does not rule out the presence of PCR inhibitors in the specimen or target RNA in concentration below the limit of detection for the assay. The possibility of a false negative should be considered if the patient's recent exposure or clinical presentation suggests COVID-19.  [facility] are certified under the Clinical Laboratory Improvement Amendments of 1988 (CLIA-88) as qualified to perform moderate and/or high complexity laboratory testing.   T4 FREE - Normal   PROCALCITONIN - Normal   CRP INFLAMMATION - Normal   BLOOD CULTURE - Normal   BLOOD CULTURE - Normal   CBC WITH PLATELETS & DIFFERENTIAL    Narrative:     The following orders were created for panel order CBC with platelets differential.  Procedure                               Abnormality         Status                     ---------                                -----------         ------                     CBC with platelets and d...[[id number]]                      Final result                 Please view results for these tests on the individual orders.   CBC WITH PLATELETS AND DIFFERENTIAL   EXTRA RED TOP TUBE   EXTRA GREEN TOP (LITHIUM HEPARIN) TUBE   CBC WITH PLATELETS & DIFFERENTIAL    Narrative:     The following orders were created for panel order CBC with Platelets & Differential.  Procedure                               Abnormality         Status                     ---------                               -----------         ------                     CBC with platelets and d...[[id number]]  Abnormal            Final result                 Please view results for these tests on the individual orders.   PERIPHERAL IV CATHETER   PULSE OXIMETRY NURSING   CARDIAC CONTINUOUS MONITORING   NURSING DRAW AND HOLD   IP ASSIGN PROVIDER TEAM TO TREATMENT TEAM   PERIPHERAL IV CATHETER   VITAL SIGNS   PULSE OXIMETRY NURSING   INTAKE AND OUTPUT   DAILY WEIGHTS   APPLY PNEUMATIC COMPRESSION DEVICE (PCD)   EXTRA TUBE    Narrative:     The following orders were created for panel order [place] Draw.  Procedure                               Abnormality         Status                     ---------                               -----------         ------                     Extra Blue Top Tube[[id number]]                                                         Extra Red Top Tube[[id number]]                               Final result               Extra Green Top (Lithium...[[id number]]                      Final result                 Please view results for these tests on the individual orders.       Imaging Studies:   Recent Results (from the past 24 hour(s))   XR Chest 2 Views    Narrative    EXAM: XR CHEST 2 VW  LOCATION: [facility]  DATE/TIME: 8/30/2021 10:57 PM    INDICATION: Fever.  evaluate for infiltrate  COMPARISON: Chest x-ray  5/13/2021.      Impression    IMPRESSION: Minimal change compared to previous chest x-ray. Left hemidiaphragm elevated with air distended stomach beneath diaphragm. There is linear infiltrate at right base most consistent with compressive atelectasis or scar. Can't exclude some   degree of left lower lobe pneumonia but this been minimal overall change. Left upper lobe and right lung remain clear. Heart size normal. Thoracic level stimulator leads.   CT Abdomen Pelvis w/o Contrast    Narrative    EXAM: CT ABDOMEN PELVIS W/O CONTRAST  LOCATION: [facility]  DATE/TIME: 8/30/2021 10:42 PM    INDICATION: Abdominal pain, fever  COMPARISON: CT chest, abdomen and pelvis 08/24/2021  TECHNIQUE: CT scan of the abdomen and pelvis was performed without IV contrast. Multiplanar reformats were obtained. Dose reduction techniques were used.  CONTRAST: None.    FINDINGS:   LOWER CHEST: Infiltrate or atelectasis within the visualized lingula and left lower lobe, similar to the prior study.    HEPATOBILIARY: Cholecystectomy.    PANCREAS: Normal.    SPLEEN: Normal.    ADRENAL GLANDS: Normal.    KIDNEYS/BLADDER: A few tiny nonobstructing stones both kidneys the largest measuring 2 mm.    BOWEL: No evidence for bowel obstruction. No bowel wall thickening or inflammatory changes. Normal appendix.    LYMPH NODES: Normal.    VASCULATURE: Unremarkable.    PELVIC ORGANS: Normal.    MUSCULOSKELETAL: Mechanical pump implanted in the right buttocks with epidural stimulation wires thoracic spine. L4/L5 spinal fusion.      Impression    IMPRESSION:   1.  Left basilar and lingular pneumonia or atelectasis, unchanged.  2.  Tiny nonobstructing stones both kidneys.  3.  Exam otherwise unremarkable.         Recent vital signs:   /74   Pulse 95   Temp 97.7  F (36.5  C)   Resp 18   Wt 84.2 kg (185 lb 10 oz)   SpO2 94%   BMI 33.94 kg/m              Cardiac Rhythm: Normal Sinus  Pt needs tele?  No  Skin/wound Issues: None    Code Status:     Pain control: fair    Nausea control: good    Abnormal labs/tests/findings requiring intervention:   Labs Ordered and Resulted from Time of ED Arrival Up to the Time of Departure from the ED   COMPREHENSIVE METABOLIC PANEL - Abnormal; Notable for the following components:       Result Value    Chloride 110 (*)     Anion Gap <1 (*)     Creatinine 1.18 (*)     Glucose 65 (*)     AST 48 (*)     ALT 55 (*)     All other components within normal limits   TSH WITH FREE T4 REFLEX - Abnormal; Notable for the following components:    TSH 0.39 (*)     All other components within normal limits   LIPASE - Abnormal; Notable for the following components:    Lipase 50 (*)     All other components within normal limits   ROUTINE UA WITH MICROSCOPIC REFLEX TO CULTURE - Abnormal; Notable for the following components:    Mucus Urine Present (*)     Squamous Epithelials Urine 4 (*)     All other components within normal limits    Narrative:     Urine Culture not indicated   ACETAMINOPHEN LEVEL - Abnormal; Notable for the following components:    Acetaminophen <2 (*)     All other components within normal limits   GLUCOSE BY METER - Abnormal; Notable for the following components:    GLUCOSE BY METER POCT 114 (*)     All other components within normal limits   CBC WITH PLATELETS - Abnormal; Notable for the following components:    RBC Count 3.37 (*)     Hemoglobin 11.0 (*)     Hematocrit 32.9 (*)     All other components within normal limits   COMPREHENSIVE METABOLIC PANEL - Abnormal; Notable for the following components:    Calcium 8.1 (*)     AST 50 (*)     ALT 54 (*)     Protein Total 6.3 (*)     All other components within normal limits   CBC WITH PLATELETS AND DIFFERENTIAL - Abnormal; Notable for the following components:    WBC Count 3.6 (*)     RBC Count 3.28 (*)     Hemoglobin 11.0 (*)     Hematocrit 32.4 (*)     MCH 33.5 (*)     All other components within normal limits   LACTIC  ACID WHOLE BLOOD - Normal   TROPONIN I - Normal   CK TOTAL - Normal   HCG QUALITATIVE PREGNANCY - Normal   COVID-19 VIRUS (CORONAVIRUS) BY PCR - Normal    Narrative:     Testing was performed using the oralia  SARS-CoV-2 & Influenza A/B Assay on the oralia  [place]  System.  This test should be ordered for the detection of SARS-COV-2 in individuals who meet SARS-CoV-2 clinical and/or epidemiological criteria. Test performance is unknown in asymptomatic patients.  This test is for in vitro diagnostic use under the FDA EUA for laboratories certified under CLIA to perform moderate and/or high complexity testing. This test has not been FDA cleared or approved.  A negative test does not rule out the presence of PCR inhibitors in the specimen or target RNA in concentration below the limit of detection for the assay. The possibility of a false negative should be considered if the patient's recent exposure or clinical presentation suggests COVID-19.  [facility] are certified under the Clinical Laboratory Improvement Amendments of 1988 (CLIA-88) as qualified to perform moderate and/or high complexity laboratory testing.   T4 FREE - Normal   PROCALCITONIN - Normal   CRP INFLAMMATION - Normal   BLOOD CULTURE - Normal   BLOOD CULTURE - Normal   CBC WITH PLATELETS & DIFFERENTIAL    Narrative:     The following orders were created for panel order CBC with platelets differential.  Procedure                               Abnormality         Status                     ---------                               -----------         ------                     CBC with platelets and d...[[id number]]                      Final result                 Please view results for these tests on the individual orders.   CBC WITH PLATELETS AND DIFFERENTIAL   EXTRA RED TOP TUBE   EXTRA GREEN TOP (LITHIUM HEPARIN) TUBE   CBC WITH PLATELETS & DIFFERENTIAL    Narrative:     The following orders were created for panel order CBC with  Platelets & Differential.  Procedure                               Abnormality         Status                     ---------                               -----------         ------                     CBC with platelets and d...[[id number]]  Abnormal            Final result                 Please view results for these tests on the individual orders.   PERIPHERAL IV CATHETER   PULSE OXIMETRY NURSING   CARDIAC CONTINUOUS MONITORING   NURSING DRAW AND HOLD   IP ASSIGN PROVIDER TEAM TO TREATMENT TEAM   PERIPHERAL IV CATHETER   VITAL SIGNS   PULSE OXIMETRY NURSING   INTAKE AND OUTPUT   DAILY WEIGHTS   APPLY PNEUMATIC COMPRESSION DEVICE (PCD)   EXTRA TUBE    Narrative:     The following orders were created for panel order [place] Draw.  Procedure                               Abnormality         Status                     ---------                               -----------         ------                     Extra Blue Top Tube[[id number]]                                                         Extra Red Top Tube[[id number]]                               Final result               Extra Green Top (Lithium...[[id number]]                      Final result                 Please view results for these tests on the individual orders.     CT Abdomen Pelvis w/o Contrast   Final Result   IMPRESSION:    1.  Left basilar and lingular pneumonia or atelectasis, unchanged.   2.  Tiny nonobstructing stones both kidneys.   3.  Exam otherwise unremarkable.         XR Chest 2 Views   Final Result   IMPRESSION: Minimal change compared to previous chest x-ray. Left hemidiaphragm elevated with air distended stomach beneath diaphragm. There is linear infiltrate at right base most consistent with compressive atelectasis or scar. Can't exclude some    degree of left lower lobe pneumonia but this been minimal overall change. Left upper lobe and right lung remain clear. Heart size normal. Thoracic level stimulator leads.          Family present  during ED course? No   Family Comments/Social Situation comments: none    Tasks needing completion: None    Joann Seth, RN  [facility] --   3-1992 [facility]

## 2021-09-01 ENCOUNTER — APPOINTMENT (OUTPATIENT)
Dept: PHYSICAL THERAPY | Facility: CLINIC | Age: 31
End: 2021-09-01
Payer: MEDICARE

## 2021-09-01 ENCOUNTER — OFFICE VISIT (OUTPATIENT)
Dept: INTERPRETER SERVICES | Facility: CLINIC | Age: 31
End: 2021-09-01
Payer: MEDICARE

## 2021-09-01 VITALS
SYSTOLIC BLOOD PRESSURE: 133 MMHG | OXYGEN SATURATION: 98 % | RESPIRATION RATE: 18 BRPM | DIASTOLIC BLOOD PRESSURE: 99 MMHG | WEIGHT: 185.63 LBS | TEMPERATURE: 97.9 F | HEART RATE: 69 BPM | BODY MASS INDEX: 33.94 KG/M2

## 2021-09-01 PROBLEM — R00.0 TACHYCARDIA, UNSPECIFIED: Status: ACTIVE | Noted: 2021-09-01

## 2021-09-01 PROBLEM — Z11.52 ENCOUNTER FOR SCREENING LABORATORY TESTING FOR SEVERE ACUTE RESPIRATORY SYNDROME CORONAVIRUS 2 (SARS-COV-2): Status: ACTIVE | Noted: 2021-09-01

## 2021-09-01 PROBLEM — N17.9 ACUTE RENAL FAILURE, UNSPECIFIED ACUTE RENAL FAILURE TYPE (H): Status: ACTIVE | Noted: 2021-09-01

## 2021-09-01 LAB
ANION GAP SERPL CALCULATED.3IONS-SCNC: 2 MMOL/L (ref 3–14)
BASOPHILS # BLD AUTO: 0 10E3/UL (ref 0–0.2)
BASOPHILS NFR BLD AUTO: 1 %
BUN SERPL-MCNC: 12 MG/DL (ref 7–30)
CALCIUM SERPL-MCNC: 8.4 MG/DL (ref 8.5–10.1)
CHLORIDE BLD-SCNC: 110 MMOL/L (ref 94–109)
CO2 SERPL-SCNC: 28 MMOL/L (ref 20–32)
CREAT SERPL-MCNC: 0.81 MG/DL (ref 0.52–1.04)
CRP SERPL-MCNC: <2.9 MG/L (ref 0–8)
EOSINOPHIL # BLD AUTO: 0.2 10E3/UL (ref 0–0.7)
EOSINOPHIL NFR BLD AUTO: 4 %
ERYTHROCYTE [DISTWIDTH] IN BLOOD BY AUTOMATED COUNT: 11.8 % (ref 10–15)
GFR SERPL CREATININE-BSD FRML MDRD: >90 ML/MIN/1.73M2
GLUCOSE BLD-MCNC: 88 MG/DL (ref 70–99)
HCT VFR BLD AUTO: 32.3 % (ref 35–47)
HGB BLD-MCNC: 11 G/DL (ref 11.7–15.7)
IMM GRANULOCYTES # BLD: 0 10E3/UL
IMM GRANULOCYTES NFR BLD: 0 %
LYMPHOCYTES # BLD AUTO: 1.5 10E3/UL (ref 0.8–5.3)
LYMPHOCYTES NFR BLD AUTO: 42 %
MCH RBC QN AUTO: 32.8 PG (ref 26.5–33)
MCHC RBC AUTO-ENTMCNC: 34.1 G/DL (ref 31.5–36.5)
MCV RBC AUTO: 96 FL (ref 78–100)
MONOCYTES # BLD AUTO: 0.4 10E3/UL (ref 0–1.3)
MONOCYTES NFR BLD AUTO: 12 %
NEUTROPHILS # BLD AUTO: 1.5 10E3/UL (ref 1.6–8.3)
NEUTROPHILS NFR BLD AUTO: 41 %
NRBC # BLD AUTO: 0 10E3/UL
NRBC BLD AUTO-RTO: 0 /100
PLATELET # BLD AUTO: 190 10E3/UL (ref 150–450)
POTASSIUM BLD-SCNC: 4.1 MMOL/L (ref 3.4–5.3)
RBC # BLD AUTO: 3.35 10E6/UL (ref 3.8–5.2)
SODIUM SERPL-SCNC: 140 MMOL/L (ref 133–144)
WBC # BLD AUTO: 3.6 10E3/UL (ref 4–11)

## 2021-09-01 PROCEDURE — T1013 SIGN LANG/ORAL INTERPRETER: HCPCS | Mod: U3

## 2021-09-01 PROCEDURE — G0378 HOSPITAL OBSERVATION PER HR: HCPCS

## 2021-09-01 PROCEDURE — 85025 COMPLETE CBC W/AUTO DIFF WBC: CPT | Performed by: INTERNAL MEDICINE

## 2021-09-01 PROCEDURE — 36415 COLL VENOUS BLD VENIPUNCTURE: CPT | Performed by: INTERNAL MEDICINE

## 2021-09-01 PROCEDURE — 80048 BASIC METABOLIC PNL TOTAL CA: CPT | Performed by: INTERNAL MEDICINE

## 2021-09-01 PROCEDURE — 250N000013 HC RX MED GY IP 250 OP 250 PS 637: Mod: GY | Performed by: INTERNAL MEDICINE

## 2021-09-01 PROCEDURE — 250N000013 HC RX MED GY IP 250 OP 250 PS 637: Mod: GY | Performed by: STUDENT IN AN ORGANIZED HEALTH CARE EDUCATION/TRAINING PROGRAM

## 2021-09-01 PROCEDURE — 97161 PT EVAL LOW COMPLEX 20 MIN: CPT | Mod: GP

## 2021-09-01 PROCEDURE — 250N000011 HC RX IP 250 OP 636: Performed by: STUDENT IN AN ORGANIZED HEALTH CARE EDUCATION/TRAINING PROGRAM

## 2021-09-01 PROCEDURE — 99217 PR OBSERVATION CARE DISCHARGE: CPT | Performed by: INTERNAL MEDICINE

## 2021-09-01 PROCEDURE — 86140 C-REACTIVE PROTEIN: CPT | Performed by: INTERNAL MEDICINE

## 2021-09-01 RX ORDER — MESALAMINE 500 MG/1
1000 CAPSULE, EXTENDED RELEASE ORAL 3 TIMES DAILY
COMMUNITY
Start: 2021-09-01 | End: 2022-01-31

## 2021-09-01 RX ORDER — CYCLOBENZAPRINE HCL 5 MG
10 TABLET ORAL 3 TIMES DAILY PRN
Status: DISCONTINUED | OUTPATIENT
Start: 2021-09-01 | End: 2021-09-01 | Stop reason: HOSPADM

## 2021-09-01 RX ORDER — POLYETHYLENE GLYCOL 3350 17 G/17G
17 POWDER, FOR SOLUTION ORAL DAILY PRN
Qty: 15 PACKET | Refills: 0 | Status: SHIPPED | OUTPATIENT
Start: 2021-09-01

## 2021-09-01 RX ORDER — BUSPIRONE HYDROCHLORIDE 15 MG/1
15 TABLET ORAL 3 TIMES DAILY
Status: DISCONTINUED | OUTPATIENT
Start: 2021-09-01 | End: 2021-09-01 | Stop reason: HOSPADM

## 2021-09-01 RX ORDER — CLONAZEPAM 1 MG/1
1 TABLET ORAL 2 TIMES DAILY PRN
Status: DISCONTINUED | OUTPATIENT
Start: 2021-09-01 | End: 2021-09-01 | Stop reason: HOSPADM

## 2021-09-01 RX ORDER — ONDANSETRON 4 MG/1
4 TABLET, ORALLY DISINTEGRATING ORAL EVERY 6 HOURS PRN
Qty: 30 TABLET | Refills: 0 | Status: SHIPPED | OUTPATIENT
Start: 2021-09-01 | End: 2022-01-19

## 2021-09-01 RX ORDER — METOPROLOL SUCCINATE 50 MG/1
50 TABLET, EXTENDED RELEASE ORAL DAILY
Status: DISCONTINUED | OUTPATIENT
Start: 2021-09-01 | End: 2021-09-01 | Stop reason: HOSPADM

## 2021-09-01 RX ORDER — RIZATRIPTAN BENZOATE 5 MG/1
5-10 TABLET, ORALLY DISINTEGRATING ORAL
Status: DISCONTINUED | OUTPATIENT
Start: 2021-09-01 | End: 2021-09-01 | Stop reason: HOSPADM

## 2021-09-01 RX ORDER — FUROSEMIDE 20 MG
20 TABLET ORAL DAILY
COMMUNITY
Start: 2021-09-03 | End: 2022-01-31

## 2021-09-01 RX ORDER — HYDROMORPHONE HYDROCHLORIDE 2 MG/1
2 TABLET ORAL EVERY 4 HOURS PRN
Qty: 12 TABLET | Refills: 0 | Status: SHIPPED | OUTPATIENT
Start: 2021-09-01 | End: 2022-01-26

## 2021-09-01 RX ORDER — MESALAMINE 400 MG/1
800 CAPSULE, DELAYED RELEASE ORAL 3 TIMES DAILY
Qty: 180 CAPSULE | Refills: 0 | Status: SHIPPED | OUTPATIENT
Start: 2021-09-01 | End: 2021-09-01

## 2021-09-01 RX ADMIN — MESALAMINE 500 MG: 500 CAPSULE ORAL at 09:26

## 2021-09-01 RX ADMIN — METOPROLOL SUCCINATE 50 MG: 50 TABLET, EXTENDED RELEASE ORAL at 12:38

## 2021-09-01 RX ADMIN — ONDANSETRON 4 MG: 4 TABLET, ORALLY DISINTEGRATING ORAL at 09:22

## 2021-09-01 RX ADMIN — ACETAMINOPHEN 650 MG: 325 TABLET, FILM COATED ORAL at 09:28

## 2021-09-01 RX ADMIN — POLYETHYLENE GLYCOL 3350 17 G: 17 POWDER, FOR SOLUTION ORAL at 09:24

## 2021-09-01 RX ADMIN — PANTOPRAZOLE SODIUM 40 MG: 40 TABLET, DELAYED RELEASE ORAL at 09:23

## 2021-09-01 RX ADMIN — HYDROMORPHONE HYDROCHLORIDE 2 MG: 2 TABLET ORAL at 12:39

## 2021-09-01 RX ADMIN — DULOXETINE HYDROCHLORIDE 60 MG: 30 CAPSULE, DELAYED RELEASE ORAL at 10:32

## 2021-09-01 RX ADMIN — DICYCLOMINE HYDROCHLORIDE 20 MG: 20 TABLET ORAL at 12:38

## 2021-09-01 RX ADMIN — BUPROPION HYDROCHLORIDE 300 MG: 150 TABLET, EXTENDED RELEASE ORAL at 10:32

## 2021-09-01 RX ADMIN — GABAPENTIN 300 MG: 300 CAPSULE ORAL at 09:23

## 2021-09-01 RX ADMIN — CLONAZEPAM 1 MG: 1 TABLET ORAL at 12:38

## 2021-09-01 RX ADMIN — GABAPENTIN 300 MG: 300 CAPSULE ORAL at 14:08

## 2021-09-01 RX ADMIN — HYDROMORPHONE HYDROCHLORIDE 2 MG: 2 TABLET ORAL at 06:05

## 2021-09-01 RX ADMIN — MESALAMINE 1000 MG: 500 CAPSULE ORAL at 14:08

## 2021-09-01 RX ADMIN — BUSPIRONE HYDROCHLORIDE 15 MG: 15 TABLET ORAL at 12:38

## 2021-09-01 NOTE — DISCHARGE SUMMARY
[facility]  Hospitalist Discharge Summary      Date of Admission:  8/30/2021  Date of Discharge:  9/1/2021  Discharging Provider: Oneal Winchester MD      Discharge Diagnoses     Sinus tachycardia, diffuse myalgias, malaise, low grade temperature ? Acute viral syndrome.   Mild transaminitis  Mild GLADYS   Hx of chronic abdominal pain  Hx of Crohn's disease  Rectal bleeding  Depression, bipolar  GERD        Follow-ups Needed After Discharge   Follow-up Appointments     Adult [facility] Follow-up and recommended labs and tests      Follow up with primary care provider, Aura Rosario,   within 4-5 days for hospital follow- up.  The following labs/tests are   recommended: CBC, CMP.     Follow up with your outpatient GI team for Chron's disease.     Follow up with Psychiatry in 1-2 weeks for anxiety disorder.     Appointments on [place] and/or [facility] (with [facility] or [facility]   provider or service). Call [phone] if you haven't heard regarding   these appointments within 7 days of discharge.             Unresulted Labs Ordered in the Past 30 Days of this Admission     Date and Time Order Name Status Description    8/30/2021  9:22 PM Blood Culture Peripheral Blood Preliminary     8/30/2021  9:22 PM Blood Culture Peripheral Blood Preliminary           Discharge Disposition   Discharged to home  Condition at discharge: Stable      Hospital Course     Katy Islas is a 31 year old female admitted on 8/30/2021. She has PMH of chronic back pain (admitted at [facility] [place] 8/24-8/29), GERD, depression, bipolar disorder, borderline personality disorder, and Crohn's Disease admitted for generalized myalgia, tachycardia, borderline fever admitted for pain control and workup.     Sinus tachycardia, diffuse myalgias, malaise, low grade temperature    On adm:   Tachycardic, temp ~99F, no leukocytosis or lactic acidosis, nl RR. C/o racing heart beat, diffuse  myalgias, general malaise. Workup including CBC, trop, lipase, lactate, CK, TSH/T4, COVID, UA, procal, CRP neg/normal. CXR shows linear infiltrate in right base likely atelectasis/scar, cannot exclude LLL PNA. CT A/P showed left basilar and lingular PNA vs atelectasis unchanged from prior imaging, otherwise negative imaging. Overall low concern for infectious source - she has had multiple ED visits in Care Everywhere for similar symptoms dating back to July or earlier and it is unlikely she has an occult infection that has not been found. She has been tachycardic throughout most of those visits as well. Most likely this is related to her chronic pain, fibromyalgia; could also consider a viral syndrome with some sore throat but no other URI sxs. Could consider withdrawal or drug reaction but patient states no new medications other than Percocet recently and she has been taking this with no improvement in pain.      - Admitted to Medicine for sepsis r/o   - Blood cultures: No growth so far.  Remains afebrile.  Stable vitals.  Off antibiotics.  - PT consulted: Recommended home with assist, home care physical therapy.     Mildly elevated Cr  Likely related to decreased intake over last two days. Baseline ~0.86-0.92, is 1.18 on admission.  Improved with hydration.  Encourage oral hydration    Mild transaminitis  Very mildly elevated ALT 55, AST 48.   Improving.  No pain abdomen.  No nausea vomiting.     Chronic back pain  Fibromyalgia  Recently admitted to [place] 8/24-8/29 for acute on chronic back pain, had MRI lumbar spine 8/28, pain control better with therapy and pain management. Offered skilled placement for rehab but patient declined, opted for outpatient care with pain specialist. This admission she has received IV pain medications and she requested fentanyl IV or patch for pain control.  - continue PTA gabapentin  - Has PTA Percocet but patient states not working. Requested PRN PO Dilaudid rather than PO  oxycodone.  - Tylenol PRN  - IV Dilaudid for breakthrough pain  - On chart review, multiple encounters for pain management. Would try to limit opioid use as much as possible. She would benefit from outpatient pain referral.      Chronic abdominal pain  Constipation  CT A/P with no acute findings to explain her pain. May be related to constipation.   - Miralax BID, can add stool softener if needed  - Continue PTA Bentyl     Crohns disease  - Continue PTA mesalamine  - No evidence of Crohn's flareup.  Outpatient GI follow-up     Rectal bleeding  Known hemorrhoids, multiple evaluations for this in ED.   Currently no evidence of rectal bleeding  Hemoglobin stable  - Constipation management     Depression  Bipolar d/o  Mood stable  - Continue PTA Abilify, Wellbutrin, Cymbalta      Tobacco use  - Declines nicotine patch  -Tobacco cessation    GERD  - Continue PTA pantoprazole and Carafate      No other concern    Consultations This Hospital Stay   PHYSICAL THERAPY ADULT IP CONSULT  CARE MANAGEMENT / SOCIAL WORK IP CONSULT    Code Status   Full Code    Time Spent on this Encounter   I, Oneal Winchester MD, personally saw the patient today and spent greater than 30 minutes discharging this patient.       Oneal Winchester MD  [facility] [address]  [address] [place] [id number]  Phone: [phone]  Fax: [phone]  ______________________________________________________________________    Physical Exam   Vital Signs: Temp: 97.9  F (36.6  C) Temp src: Oral BP: (!) 133/99 Pulse: 69   Resp: 18 SpO2: 98 % O2 Device: None (Room air)    Weight: 185 lbs 10.04 oz  Seen in presence of the     General: alert, interactive, NAD  HEENT: AT/NC, Anicteric, Moist MM  Neck: Supple.   Respi/Chest: Non labored, CTA BL.  CVS/Heart: S1S2 regular  GI/Abd: Soft, non tender, non distended, BS +, No g/r.  MSK/Extremities: Distally warm, well perfused.     Neuro: AO x 4, Grossly non focal.   Psychiatry: Stable mood.   Skin: no  acute rash on exposed areas.       Primary Care Physician   Aura Rosario    Discharge Orders      Reason for your hospital stay    Generalized body aches, malaise, nausea. ? Viral syndrome.   Concern about bloody bowel movement x 1 . Currently none. Vitals stable. Hgb stable at 11.0.   Follow-up with your outpatient primary care and GI team.     Activity    Your activity upon discharge: activity as tolerated     Adult [facility] Follow-up and recommended labs and tests    Follow up with primary care provider, Aura Rosario, within 4-5 days for hospital follow- up.  The following labs/tests are recommended: CBC, CMP.     Follow up with your outpatient GI team for Chron's disease.     Follow up with Psychiatry in 1-2 weeks for anxiety disorder.     Appointments on [place] and/or [facility] (with [facility] or [facility] provider or service). Call [phone] if you haven't heard regarding these appointments within 7 days of discharge.     Diet    Follow this diet upon discharge: Orders Placed This Encounter      Combination Diet Regular Diet Adult      Stay hydrated.       Significant Results and Procedures   Most Recent 3 CBC's:Recent Labs   Lab Test 09/05/21  0602 09/03/21  1331 09/01/21  0545   WBC 5.3 6.2 3.6*   HGB 11.0* 12.0 11.0*   MCV 97 97 96    214 190     Most Recent 3 BMP's:Recent Labs   Lab Test 09/05/21  0602 09/03/21  1331 09/01/21  0545    139 140   POTASSIUM 3.8 4.2 4.1   CHLORIDE 108 108 110*   CO2 29 30 28   BUN 18 13 12   CR 1.01 0.97 0.81   ANIONGAP 4 1* 2*   SARANYA 9.8 9.2 8.4*   GLC 85 80 88     Most Recent 2 LFT's:Recent Labs   Lab Test 09/05/21  0602 09/03/21  1331   AST 30 46*   ALT 39 44   ALKPHOS 71 84   BILITOTAL 0.2 0.3     Most Recent 3 INR's:Recent Labs   Lab Test 09/05/21  0602 09/03/21  1331 08/24/21  1905   INR 1.09 0.97 1.01     Most Recent 6 Bacteria Isolates From Any Culture (See EPIC Reports for Culture Details):Recent Labs   Lab Test  01/14/21 2051 01/14/21 1954 08/06/20  0101 04/18/20  0216 04/18/20  0028 04/11/20  1833   CULT Light growth  Normal skin elly   No growth 50,000 to 100,000 colonies/mL  Escherichia coli  * 10,000 to 50,000 colonies/mL  Coagulase negative Staphylococcus  *  10,000 to 50,000 colonies/mL  Streptococcus agalactiae sero group B  Susceptibility testing not routinely done on this organism from the genitourinary tract.   Our antibiogram indicates that Group B streptococci are susceptible to ampicillin,   penicillin, vancomycin and the cephalosporins. Susceptibility testing must be requested   within 5 days.  Group B Streptococcus may be significant in OB patients, however, it is part of the normal   urogenital elly.  *  <10,000 colonies/mL  urogenital elly  Susceptibility testing not routinely done   No growth  No growth <10,000 colonies/mL  Streptococcus agalactiae sero group B  Susceptibility testing not routinely done on this organism from the genitourinary tract.   Our antibiogram indicates that Group B streptococci are susceptible to ampicillin,   penicillin, vancomycin and the cephalosporins. Susceptibility testing must be requested   within 5 days.  Group B Streptococcus may be significant in OB patients, however, it is part of the normal   urogenital elly.  *  <10,000 colonies/mL  urogenital elly  Susceptibility testing not routinely done       Most Recent ESR & CRP:Recent Labs   Lab Test 09/03/21  1331 05/27/21  0151   SED  --  30*   CRP 29.0*  --    ,   Results for orders placed or performed during the hospital encounter of 08/30/21   XR Chest 2 Views    Narrative    EXAM: XR CHEST 2 VW  LOCATION: [facility]  DATE/TIME: 8/30/2021 10:57 PM    INDICATION: Fever.  evaluate for infiltrate  COMPARISON: Chest x-ray 5/13/2021.      Impression    IMPRESSION: Minimal change compared to previous chest x-ray. Left hemidiaphragm elevated with air distended stomach  beneath diaphragm. There is linear infiltrate at right base most consistent with compressive atelectasis or scar. Can't exclude some   degree of left lower lobe pneumonia but this been minimal overall change. Left upper lobe and right lung remain clear. Heart size normal. Thoracic level stimulator leads.   CT Abdomen Pelvis w/o Contrast    Narrative    EXAM: CT ABDOMEN PELVIS W/O CONTRAST  LOCATION: [facility]  DATE/TIME: 8/30/2021 10:42 PM    INDICATION: Abdominal pain, fever  COMPARISON: CT chest, abdomen and pelvis 08/24/2021  TECHNIQUE: CT scan of the abdomen and pelvis was performed without IV contrast. Multiplanar reformats were obtained. Dose reduction techniques were used.  CONTRAST: None.    FINDINGS:   LOWER CHEST: Infiltrate or atelectasis within the visualized lingula and left lower lobe, similar to the prior study.    HEPATOBILIARY: Cholecystectomy.    PANCREAS: Normal.    SPLEEN: Normal.    ADRENAL GLANDS: Normal.    KIDNEYS/BLADDER: A few tiny nonobstructing stones both kidneys the largest measuring 2 mm.    BOWEL: No evidence for bowel obstruction. No bowel wall thickening or inflammatory changes. Normal appendix.    LYMPH NODES: Normal.    VASCULATURE: Unremarkable.    PELVIC ORGANS: Normal.    MUSCULOSKELETAL: Mechanical pump implanted in the right buttocks with epidural stimulation wires thoracic spine. L4/L5 spinal fusion.      Impression    IMPRESSION:   1.  Left basilar and lingular pneumonia or atelectasis, unchanged.  2.  Tiny nonobstructing stones both kidneys.  3.  Exam otherwise unremarkable.       *Note: Due to a large number of results and/or encounters for the requested time period, some results have not been displayed. A complete set of results can be found in Results Review.       Discharge Medications   Current Discharge Medication List      START taking these medications    Details   HYDROmorphone (DILAUDID) 2 MG tablet Take 1 tablet (2  mg) by mouth every 4 hours as needed for moderate to severe pain  Qty: 12 tablet, Refills: 0    Associated Diagnoses: Crohn's disease of colon without complication (H)      polyethylene glycol (MIRALAX) 17 g packet Take 17 g by mouth daily as needed for constipation  Qty: 15 packet, Refills: 0    Associated Diagnoses: Crohn's disease of colon without complication (H)         CONTINUE these medications which have CHANGED    Details   furosemide (LASIX) 20 MG tablet Take 1 tablet (20 mg) by mouth daily      ondansetron (ZOFRAN-ODT) 4 MG ODT tab Take 1 tablet (4 mg) by mouth every 6 hours as needed for nausea  Qty: 30 tablet, Refills: 0    Associated Diagnoses: Hematemesis with nausea         CONTINUE these medications which have NOT CHANGED    Details   albuterol (PROAIR HFA/PROVENTIL HFA/VENTOLIN HFA) 108 (90 Base) MCG/ACT inhaler Inhale 2 puffs into the lungs every 6 hours as needed for shortness of breath / dyspnea or wheezing  Qty: 18 g, Refills: 3    Comments: Pharmacy may dispense brand covered by insurance (Proair, or proventil or ventolin or generic albuterol inhaler)  Associated Diagnoses: Mild persistent asthma without complication      ARIPiprazole (ABILIFY) 30 MG tablet Take 1 tablet (30 mg) by mouth At Bedtime  Qty: 30 tablet, Refills: 1    Associated Diagnoses: Adjustment disorder with mixed anxiety and depressed mood; Anxiety      buPROPion (WELLBUTRIN XL) 300 MG 24 hr tablet Take 1 tablet (300 mg) by mouth every morning  Qty: 90 tablet, Refills: 3    Associated Diagnoses: Adjustment disorder with mixed anxiety and depressed mood; Mild major depression (H); Bipolar disorder, current episode mixed, severe, without psychotic features (H)      busPIRone (BUSPAR) 15 MG tablet Take 15 mg by mouth 3 times daily      clonazePAM (KLONOPIN) 1 MG tablet Take 1 mg by mouth 2 times daily as needed for anxiety      cyclobenzaprine (FLEXERIL) 10 MG tablet Take 10 mg by mouth 3 times daily as needed      dicyclomine  (BENTYL) 20 MG tablet Take 1 tablet (20 mg) by mouth every 6 hours  Qty: 90 tablet, Refills: 3    Associated Diagnoses: Crohn's disease of colon with fistula (H)      DULoxetine (CYMBALTA) 30 MG capsule Take 2 capsules (60 mg) by mouth daily  Qty: 90 capsule, Refills: 1    Associated Diagnoses: Adjustment disorder with mixed anxiety and depressed mood      gabapentin (NEURONTIN) 300 MG capsule Take 1 capsule (300 mg) by mouth 3 times daily  Qty: 270 capsule, Refills: 3    Associated Diagnoses: Chronic bilateral low back pain with left-sided sciatica      metoprolol succinate ER (TOPROL-XL) 50 MG 24 hr tablet Take 50 mg by mouth daily      pantoprazole (PROTONIX) 40 MG EC tablet Take 1 tablet (40 mg) by mouth 2 times daily Take 30-60 minutes before a meal.  Qty: 180 tablet, Refills: 3    Associated Diagnoses: Gastroesophageal reflux disease without esophagitis      sucralfate (CARAFATE) 1 GM tablet Take 1 tablet (1 g) by mouth 4 times daily as needed (heartburn)  Qty: 360 tablet, Refills: 3    Associated Diagnoses: Hematemesis with nausea; Acute gastritis with hemorrhage, unspecified gastritis type      azelastine (OPTIVAR) 0.05 % ophthalmic solution Place 1 drop into both eyes 2 times daily  Qty: 6 mL, Refills: 11    Associated Diagnoses: Allergic conjunctivitis, bilateral      EPINEPHrine (ANY BX GENERIC EQUIV) 0.3 MG/0.3ML injection 2-pack Inject 0.3 mLs (0.3 mg) into the muscle once as needed for anaphylaxis  Qty: 0.6 mL, Refills: 3    Associated Diagnoses: Bee sting allergy      lidocaine (XYLOCAINE) 2 % solution Swish and swallow 15 mLs in mouth every 4 hours as needed for other (GERD) ; Max 8 doses/24 hour period. Mix with 15 mLs Maalox or Mylanta.  Qty: 100 mL, Refills: 3    Associated Diagnoses: Gastroesophageal reflux disease without esophagitis      medroxyPROGESTERone (DEPO-PROVERA) 150 MG/ML IM injection Inject 1 mL (150 mg) into the muscle every 3 months  Qty: 3 mL, Refills: 3    Associated Diagnoses:  Encounter for initial prescription of injectable contraceptive      mesalamine ER (PENTASA) 500 MG CR capsule Take 1-2 capsules (500-1,000 mg) by mouth 3 times daily  Qty: 180 capsule, Refills: 11    Associated Diagnoses: Crohn's disease of colon without complication (H)      rizatriptan (MAXALT-MLT) 5 MG ODT Take 1-2 tablets (5-10 mg) by mouth at onset of headache for migraine May repeat in 2 hours. Max 6 tablets/24 hours.  Qty: 18 tablet, Refills: 3    Associated Diagnoses: Migraine with aura and without status migrainosus, not intractable           Allergies   Allergies   Allergen Reactions     Iohexol Hives     Other reaction(s): Hives  IV contrast; patient states she tolerates contrast if she gets benadryl before  IV contrast; patient states she tolerates contrast if she gets benadryl before     Other Environmental Allergy Rash     Plastic tape     Baclofen      hives     Bees Hives, Swelling and Difficulty breathing     Contrast Dye      Hives,   Updated 5/10/2016 CT Contrast.     Iodine Hives     Metoclopramide      Other reaction(s): Tremors  LW Reaction: shaking/sweating     Midazolam      Other reaction(s): Agitation     Monosodium Glutamate      Morphine Other (See Comments)     Difficulty with urination     Nsaids Other (See Comments)     GI BLEED x2     Other (Do Not Use) Other (See Comments)     Xanaflex- pt becomes disoriented and loses bladder control     Reglan [Metoclopramide Hcl] Other (See Comments)     shaking     Soma [Carisoprodol] Visual Disturbance     Sleep walking     Tizanidine      Topamax Other (See Comments)     Topamax [Topiramate] Nausea     Tingling  GI/Vomit     Tramadol      Severe Headache, Seizure Risk     Tylenol W/Codeine [Acetaminophen-Codeine] Nausea and Itching     Tylenol 3     Versed Other (See Comments)     Zolmitriptan      Makes face feel like its twitching     Droperidol Anxiety     Flu Virus Vaccine Rash      Arm swelling

## 2021-09-01 NOTE — PROGRESS NOTES
SPIRITUAL HEALTH SERVICES  SPIRITUAL ASSESSMENT Progress Note  [facility] [address]   ON-CALL VISIT    REFERRAL SOURCE: request at admission for chaplaincy care    Visited Katy with the support of  services. Katy shared her hopes for feeling better in the midst of dealing with significant health concerns. She named grief over the death of a close friend and concern for her friend's partner who is pregnant with twins. Katy said she may be moving to [place] to be near her mother and brother who are worried about her. Katy requested prayer concerning all of these things. Prayer and spiritual support were provided.    PLAN: Will inform unit  of care provided. SHS remains available, as needed.    Maya Payton  Chaplain Resident  Pager: [phone]

## 2021-09-01 NOTE — CONSULTS
Care Management Initial Consult    General Information  Assessment completed with: VM-chart review,    Type of CM/SW Visit: Chart Assessment    Primary Care Provider verified and updated as needed:     Readmission within the last 30 days:  Was hospitalized 8/24/22-8/29/21         Advance Care Planning: No ACP documents       Communication Assessment  Patient's communication style: spoken language (English or Bilingual) ( )    Hearing Difficulty or Deaf: yes   Wear Glasses or Blind: yes    Cognitive  Cognitive/Neuro/Behavioral: .WDL except, speech  Level of Consciousness: alert  Arousal Level: opens eyes spontaneously  Orientation: oriented x 4  Mood/Behavior: calm, cooperative  Best Language: 0 - No aphasia  Speech: slow    Living Environment:   People in home: alone     Current living Arrangements: mobile home      Able to return to prior arrangements:  (TBD)       Family/Social Support:  Care provided by:    Provides care for:       Parent(s), Sibling(s), Neighbor          Description of Support System:  (Mother and brother live in [place])         Current Resources:   Patient receiving home care services: No (Referral was made to [facility] at previous hospitalization but they have not started services yet)     Community Resources:    Equipment currently used at home: walker, rolling  Supplies currently used at home:      Employment/Financial:  Employment Status:          Financial Concerns:             Lifestyle & Psychosocial Needs:  Social Determinants of Health     Tobacco Use: High Risk     Smoking Tobacco Use: Current Every Day Smoker     Smokeless Tobacco Use: Never Used   Alcohol Use:      Frequency of Alcohol Consumption:      Average Number of Drinks:      Frequency of Binge Drinking:    Financial Resource Strain:      Difficulty of Paying Living Expenses:    Food Insecurity:      Worried About Running Out of Food in the Last Year:      Ran Out of Food in the Last Year:    Transportation  Needs:      Lack of Transportation (Medical):      Lack of Transportation (Non-Medical):    Physical Activity:      Days of Exercise per Week:      Minutes of Exercise per Session:    Stress:      Feeling of Stress :    Social Connections:      Frequency of Communication with Friends and Family:      Frequency of Social Gatherings with Friends and Family:      Attends Gnosticism Services:      Active Member of Clubs or Organizations:      Attends Club or Organization Meetings:      Marital Status:    Intimate Partner Violence:      Fear of Current or Ex-Partner:      Emotionally Abused:      Physically Abused:      Sexually Abused:    Depression: At risk     PHQ-2 Score: 3   Housing Stability:      Unable to Pay for Housing in the Last Year:      Number of Places Lived in the Last Year:      Unstable Housing in the Last Year:        Functional Status:  Prior to admission patient needed assistance:     Mental Health Status:          Chemical Dependency Status:                Values/Beliefs:  Spiritual, Cultural Beliefs, Gnosticism Practices, Values that affect care:  Description of Beliefs that Will Affect Care: Yadiel            Additional Information:  Attempted to meet with patient with . Patient appeared to be sleeping and did not wake with gentle touch. Completed chart review. Patient was recently hospitalized (8/24/22-8/29/21) at [facility] for pain management of acute on chronic back pain. Therapy recommended rehab facility but patient wanted to return home. A referral was made to [facility] ([place]PT. However, they have not seen patient yet and will need new orders/F2F. Patient went to [facility] emergency room 8/29/21 and left before being formally discharged. She then came to [facility] on 8/30/21. PT is recommending home with home care. However, they were not able to assess mobility during assessment. Care management to continue to follow.      1:38 PM  Met with patient to  discuss discharge planning with an . Discussed [facility]-PT and she agreed. She requested that her mother be the contact for them to make an appointment. [facility] Left Vm for patient's mother and requested call back to go over discharge plan, per patient's request. Friend is planning to transport.     2:17 PM  Spoke to patient's mother and she verbalized understanding and agreement to plan. She requested information regarding outpatient psych evaluation. Notified  who will put information on patient's AVS.     RADHA De Leon RNCC  RN Care Coordinator   Office: [phone]

## 2021-09-01 NOTE — PROGRESS NOTES
09/01/21 1009   Quick Adds   Type of Visit Initial PT Evaluation       Present no   Language English   Living Environment   People in home alone   Current Living Arrangements mobile home   Number of Stairs, Main Entrance 3   Stair Railings, Main Entrance railings on both sides of stairs   Number of Stairs, Within Home, Primary 2   Stair Railings, Within Home, Primary railings on both sides of stairs   Transportation Anticipated car, drives self   Self-Care   Usual Activity Tolerance moderate   Current Activity Tolerance poor   Regular Exercise No   Equipment Currently Used at Home walker, rolling   Disability/Function   Hearing Difficulty or Deaf yes   Patient's preferred means of communication    Describe hearing loss bilateral hearing loss   Wear Glasses or Blind yes   Vision Management Glasses   Concentrating, Remembering or Making Decisions Difficulty no   Difficulty Communicating no   Difficulty Eating/Swallowing no   Walking or Climbing Stairs Difficulty no   Walking or Climbing Stairs ambulation difficulty, requires equipment   Dressing/Bathing Difficulty yes   Dressing/Bathing bathing difficulty, requires equipment   Toileting issues no   Doing Errands Independently Difficulty (such as shopping) no   Fall history within last six months yes   Number of times patient has fallen within last six months 1   General Information   Onset of Illness/Injury or Date of Surgery 09/01/21   Referring Physician Herberth Ventura   Patient/Family Therapy Goals Statement (PT) Does not endorse   Pertinent History of Current Problem (include personal factors and/or comorbidities that impact the POC) 31 year old female admitted on 8/30/2021. She has PMH of chronic back pain (admitted at [facility] 8/24-8/29), GERD, depression, bipolar disorder, borderline personality disorder, and Crohn's Disease admitted for generalized myalgia, tachycardia, borderline fever admitted for pain  control and workup.   Existing Precautions/Restrictions fall   Weight-Bearing Status - LUE full weight-bearing   Weight-Bearing Status - RUE full weight-bearing   Weight-Bearing Status - LLE full weight-bearing   Weight-Bearing Status - RLE full weight-bearing   General Observations Supine in bed upon arrival, quite agitated   Cognition   Orientation Status (Cognition) oriented x 3   Affect/Mental Status (Cognition) WFL   Follows Commands (Cognition) WFL   Pain Assessment   Patient Currently in Pain Yes, see Vital Sign flowsheet   Integumentary/Edema   Integumentary/Edema no deficits were identifed   Posture    Posture Forward head position;Protracted shoulders;Kyphosis   Posture Comments Mild sitting EOB and standing   Range of Motion (ROM)   ROM Comment Did not formally assess, demosntrates functional ROM with mobility   Strength   Strength Comments Did not formally assess, demonstrates functional strength with mobility but generally deconditioned with decreased activity tolerance   Bed Mobility   Comment (Bed Mobility) Repositions in bed L and R independently   Transfers   Transfer Safety Comments Adamantly refuses despite encouragement   Gait/Stairs (Locomotion)   Comment (Gait/Stairs) Adamantly refuses despite encouragement   Balance   Balance Comments Unable to assess   Sensory Examination   Sensory Perception Comments Unable to assess   Clinical Impression   Criteria for Skilled Therapeutic Intervention yes, treatment indicated   PT Diagnosis (PT) impaired functional mobility   Influenced by the following impairments increased pain, decreased activity tolerance   Functional limitations due to impairments impaired bed mobility, transfers and ambulation   Clinical Presentation Stable/Uncomplicated   Clinical Presentation Rationale 31 year old female admitted on 8/30/2021. She has PMH of chronic back pain (admitted at [facility] 8/24-8/29), GERD, depression, bipolar disorder, borderline personality disorder,  and Crohn's Disease admitted for generalized myalgia, tachycardia, borderline fever admitted for pain control and workup. Mobilizing well   Clinical Decision Making (Complexity) low complexity   Therapy Frequency (PT) 5x/week   Predicted Duration of Therapy Intervention (days/wks) 5 days   Planned Therapy Interventions (PT) balance training;bed mobility training;gait training;home exercise program;stair training;strengthening;transfer training;progressive activity/exercise;risk factor education;home program guidelines   Anticipated Equipment Needs at Discharge (PT)   (has walker)   Risk & Benefits of therapy have been explained evaluation/treatment results reviewed;care plan/treatment goals reviewed;risks/benefits reviewed;current/potential barriers reviewed   PT Discharge Planning    PT Discharge Recommendation (DC Rec) home with assist;home with home care physical therapy   PT Rationale for DC Rec PT: Unable to assess mobility but had been at home and was recently discharged with home PT   PT Brief overview of current status  PT: Re-positions in bed independently   Total Evaluation Time   Total Evaluation Time (Minutes) 10

## 2021-09-01 NOTE — PLAN OF CARE
Pt. received complete discharge paperwork and is waiting for meds to be sent from discharge pharmacy. Pt. was given times of last dose for all discharge medications in writing on discharge medication sheets. Discharge teaching completed Pt. to follow up with PCP on 9/20. Pt. had no further questions at the time of discharge and no unmet needs were identified.

## 2021-09-01 NOTE — PLAN OF CARE
/85 (BP Location: Left arm)   Pulse 89   Temp 98.1  F (36.7  C) (Oral)   Resp 18   Wt 84.2 kg (185 lb 10 oz)   SpO2 98%   BMI 33.94 kg/m      Patient uses .  Patient is alert and oriented x4. VSS. On RA. Denies SOB, denies distress, denies chest pain. Clear LS.  Reported pain to bilateral lower extremities. Received PO dilaudid x2  with good effect.     Moderate strength to all extremities. A of 1 to bed side commode with walker and belt. Can be unsteady on feet. Patient is able to shift weight and turn in bed independently.     Regular diet thin liquid. Ate everything from dinner tray. Voiding spontaneously. LBM today this afternoon per pt. BS+.  Pt declined scheduled Miralax this shift.     PIV to right  Cubital SL.    Skin is CDI with some bruising. Patient is able to make needs know. Alarm on for safety. Call light with in reach.     Monitoring Hgb closely.  Last Hgb this shift 11.6.

## 2021-09-01 NOTE — DISCHARGE INSTRUCTIONS
[facility]   Lab 09/01/21  0545 08/31/21  0808 08/30/21  2347 08/30/21 2027 08/29/21  0733 08/28/21  1352    140  --  140 139 137   POTASSIUM 4.1 4.0  --  4.2 4.1 4.3   CHLORIDE 110* 108  --  110* 106 104   CO2 28 28  --  30 29 30   ANIONGAP 2* 4  --  <1* 4 3   GLC 88 90 114* 65* 84 83   BUN 12 13  --  18 10 9   CR 0.81 0.93  --  1.18* 0.86 0.92   GFRESTIMATED >90 82  --  61 90 83   SARANYA 8.4* 8.1*  --  9.6 9.0 9.0   PROTTOTAL  --  6.3*  --  7.8 6.4* 6.7*   ALBUMIN  --  3.4  --  4.0 3.2* 3.4   BILITOTAL  --  0.3  --  0.4 0.5 0.2   ALKPHOS  --  78  --  101 87 93   AST  --  50*  --  48* 16 18   ALT  --  54*  --  55* 28 31     CBC  Recent Labs   Lab 09/01/21  0545 08/31/21 2012 08/31/21  1242 08/31/21  0808 08/30/21 2028   WBC 3.6*  --  3.6* 4.2 5.4   RBC 3.35*  --  3.28* 3.37* 4.30   HGB 11.0* 11.6* 11.0* 11.0* 14.1   HCT 32.3*  --  32.4* 32.9* 41.7   MCV 96  --  99 98 97   MCH 32.8  --  33.5* 32.6 32.8   MCHC 34.1  --  34.0 33.4 33.8   RDW 11.8  --  12.4 12.2 12.2     --  206 209 283     Results for orders placed or performed during the hospital encounter of 08/30/21   XR Chest 2 Views    Narrative    EXAM: XR CHEST 2 VW  LOCATION: [facility]  DATE/TIME: 8/30/2021 10:57 PM    INDICATION: Fever.  evaluate for infiltrate  COMPARISON: Chest x-ray 5/13/2021.      Impression    IMPRESSION: Minimal change compared to previous chest x-ray. Left hemidiaphragm elevated with air distended stomach beneath diaphragm. There is linear infiltrate at right base most consistent with compressive atelectasis or scar. Can't exclude some   degree of left lower lobe pneumonia but this been minimal overall change. Left upper lobe and right lung remain clear. Heart size normal. Thoracic level stimulator leads.   CT Abdomen Pelvis w/o Contrast    Narrative    EXAM: CT ABDOMEN PELVIS W/O CONTRAST  LOCATION: [facility]  DATE/TIME:  8/30/2021 10:42 PM    INDICATION: Abdominal pain, fever  COMPARISON: CT chest, abdomen and pelvis 08/24/2021  TECHNIQUE: CT scan of the abdomen and pelvis was performed without IV contrast. Multiplanar reformats were obtained. Dose reduction techniques were used.  CONTRAST: None.    FINDINGS:   LOWER CHEST: Infiltrate or atelectasis within the visualized lingula and left lower lobe, similar to the prior study.    HEPATOBILIARY: Cholecystectomy.    PANCREAS: Normal.    SPLEEN: Normal.    ADRENAL GLANDS: Normal.    KIDNEYS/BLADDER: A few tiny nonobstructing stones both kidneys the largest measuring 2 mm.    BOWEL: No evidence for bowel obstruction. No bowel wall thickening or inflammatory changes. Normal appendix.    LYMPH NODES: Normal.    VASCULATURE: Unremarkable.    PELVIC ORGANS: Normal.    MUSCULOSKELETAL: Mechanical pump implanted in the right buttocks with epidural stimulation wires thoracic spine. L4/L5 spinal fusion.      Impression    IMPRESSION:   1.  Left basilar and lingular pneumonia or atelectasis, unchanged.  2.  Tiny nonobstructing stones both kidneys.  3.  Exam otherwise unremarkable.       *Note: Due to a large number of results and/or encounters for the requested time period, some results have not been displayed. A complete set of results can be found in Results Review.

## 2021-09-01 NOTE — PLAN OF CARE
"BP (!) 144/86 (BP Location: Left arm)   Pulse 78   Temp 97.9  F (36.6  C) (Oral)   Resp 18   Wt 84.2 kg (185 lb 10 oz)   SpO2 98%   BMI 33.94 kg/m    Pt was calm and cooperative this morning, signing with . Stated she had a migraine so got an order from MD for her PRN med she takes at home. Writer was waiting for med to be sent up from pharmacy and after 1 hour pt became agitated and laid prone in bed with her head in a pillow, tried to get pt's attention to give the medications she requested but pt kept signing to  \"stop\" then would put her face back into her pillow. Pt also refused PT despite 3 attempts to explain to pt why it would be beneficial.   Pt later woke up at 1230 requesting to use the bathroom and said her migraine was gone and she felt better. She then accepted the rest of her morning medications and requested PRNs at that time.    VSS. On room air. A&Ox4. LS clear. Pt denies SOB and chest pain.  Ax1 and walker ambulated in halls with staff, tolerated well. BS active and audible in all quadrants. LBM 9/30. Pt passing gas. Voiding without difficulty.Regular diet, tolerating well. Numbness/tingling in BLE. PIV removed. Pt given Tylenol for pain then refused her requested PRN med later for her migraine. Continue to monitor. Pt able to make needs known, call light within reach. Discharge to home once she gets her meds from pharmacy.     "

## 2021-09-01 NOTE — PROGRESS NOTES
"Pt declining some of her morning medications today, refused PT. Asked for klonopin earlier now refusing it. Refusing for BP to be taken. Laying face down in bed refusing communication. Signing \"Stop\" per the  to the RN when trying to give pt medications. Signed \"Don't force me, leave me alone\" to PT.   "

## 2021-09-01 NOTE — PROGRESS NOTES
Blood pressure 123/85, pulse 89, temperature 98.1  F (36.7  C), temperature source Oral, resp. rate 18, weight 84.2 kg (185 lb 10 oz), SpO2 98 %    Uneventful night. Slept well throughout night. Patient uses .  Patient is alert and oriented x4. VSS. On RA. Denies SOB. Clear LS.  Reports pain to bilateral lower extremities. Received PO dilaudid x2  with good effect.       Assist of 1 to bed side commode with walker and belt. Regular diet thin liquid. Voiding spontaneously. LBM 8/31. Alarm on for safety. Call light with in reach.    Monitoring Hgb closely.  Last 3 draws 11.0, 11.6 then now 11.0 at 0600. Continue the plan of care.

## 2021-09-01 NOTE — UTILIZATION REVIEW
"  Continued stay status; Secondary Review Determination     Admission Date: 8/30/2021  8:27 PM      Under the authority of the Utilization Management Committee, the utilization review process indicated a secondary review on the above patient.  The review outcome is based on review of the medical records, discussions with staff, and applying clinical experience noted on the date of the review.        ()      Inpatient Status Appropriate - This patient's medical care is consistent with medical management for inpatient care and reasonable inpatient medical practice.      (x) Observation Status Appropriate - This patient does not meet hospital inpatient criteria and is placed in observation status. If this patient's primary payer is Medicare and was admitted as an inpatient, Condition Code 44 should be used and patient status changed to \"observation\".   () Admission Status NOT Appropriate - This patient's medical care is not consistent with medical management for Inpatient or Observation Status.          RATIONALE FOR DETERMINATION   Katy Islas is a 31 year old female admitted on 8/30/2021. She has PMH of chronic back pain (admitted at [facility] 8/24-8/29), GERD, depression, bipolar disorder, borderline personality disorder, and Crohn's Disease.  She presented to emergency room with concern for fevers and generalized malaise.  She was noted to have mild acute kidney injury with a creatinine elevated at 1.2.  She was initially given 1 L of normal saline and IV fluid bolus.  Creatinine has normalized.  She was placed in the hospital on observation status for further monitoring.  She is not requiring any IV medications today.  She should be able to discharge from the hospital soon.  She was initially placed in the hospital on observation status.  As she should be able to discharge from the hospital soon, and has not required any IV medications today, observation status at the hospital remains " appropriate.      The severity of illness, intensity of service provided, expected LOS and risk for adverse outcome make the care appropriate for observation level care at the hospital.        The information on this document is developed by the utilization review team in order for the business office to ensure compliance.  This only denotes the appropriateness of proper admission status and does not reflect the quality of care rendered.         The definitions of Inpatient Status and Observation Status used in making the determination above are those provided in the CMS Coverage Manual, Chapter 1 and Chapter 6, section 70.4.      Sincerely,     Van Gonzalez D.O.  Utilization Review/ Case Management  [facility].

## 2021-09-02 ENCOUNTER — TELEPHONE (OUTPATIENT)
Dept: FAMILY MEDICINE | Facility: OTHER | Age: 31
End: 2021-09-02

## 2021-09-02 NOTE — TELEPHONE ENCOUNTER
MTM referral from: Transitions of Care (recent hospital discharge or ED visit)    MTM referral outreach attempt #2 on September 2, 2021 at 3:32 PM      Outcome: Patient not reachable after several attempts, will route to MTM Pharmacist/Provider as an FYI. Thank you for the referral.    Tony Whalen, MTM coordinator

## 2021-09-03 ENCOUNTER — OFFICE VISIT (OUTPATIENT)
Dept: INTERPRETER SERVICES | Facility: CLINIC | Age: 31
End: 2021-09-03

## 2021-09-03 ENCOUNTER — NURSE TRIAGE (OUTPATIENT)
Dept: FAMILY MEDICINE | Facility: OTHER | Age: 31
End: 2021-09-03

## 2021-09-03 ENCOUNTER — APPOINTMENT (OUTPATIENT)
Dept: ULTRASOUND IMAGING | Facility: CLINIC | Age: 31
DRG: 864 | End: 2021-09-03
Attending: INTERNAL MEDICINE
Payer: MEDICARE

## 2021-09-03 ENCOUNTER — HOSPITAL ENCOUNTER (INPATIENT)
Facility: CLINIC | Age: 31
LOS: 1 days | Discharge: LEFT BEFORE DISCHARGE | DRG: 864 | End: 2021-09-04
Attending: FAMILY MEDICINE | Admitting: INTERNAL MEDICINE
Payer: MEDICARE

## 2021-09-03 ENCOUNTER — OFFICE VISIT (OUTPATIENT)
Dept: INTERPRETER SERVICES | Facility: CLINIC | Age: 31
DRG: 864 | End: 2021-09-03
Payer: MEDICARE

## 2021-09-03 ENCOUNTER — APPOINTMENT (OUTPATIENT)
Dept: INTERPRETER SERVICES | Facility: CLINIC | Age: 31
DRG: 864 | End: 2021-09-03
Payer: MEDICARE

## 2021-09-03 VITALS
OXYGEN SATURATION: 95 % | RESPIRATION RATE: 16 BRPM | TEMPERATURE: 98.6 F | SYSTOLIC BLOOD PRESSURE: 112 MMHG | DIASTOLIC BLOOD PRESSURE: 85 MMHG | HEART RATE: 107 BPM

## 2021-09-03 DIAGNOSIS — M79.605 LEFT LEG PAIN: ICD-10-CM

## 2021-09-03 DIAGNOSIS — G89.29 CHRONIC MIDLINE LOW BACK PAIN WITHOUT SCIATICA: ICD-10-CM

## 2021-09-03 DIAGNOSIS — R50.9 HYPERTHERMIA-INDUCED DEFECT: ICD-10-CM

## 2021-09-03 DIAGNOSIS — M54.50 LOW BACK PAIN WITHOUT SCIATICA, UNSPECIFIED BACK PAIN LATERALITY, UNSPECIFIED CHRONICITY: ICD-10-CM

## 2021-09-03 DIAGNOSIS — R53.1 ASTHENIA: ICD-10-CM

## 2021-09-03 DIAGNOSIS — R26.89 ANTALGIC GAIT: ICD-10-CM

## 2021-09-03 DIAGNOSIS — M79.604 BILATERAL LEG PAIN: ICD-10-CM

## 2021-09-03 DIAGNOSIS — M79.605 BILATERAL LEG PAIN: ICD-10-CM

## 2021-09-03 DIAGNOSIS — M54.50 CHRONIC MIDLINE LOW BACK PAIN WITHOUT SCIATICA: ICD-10-CM

## 2021-09-03 DIAGNOSIS — M79.604 RIGHT LEG PAIN: ICD-10-CM

## 2021-09-03 DIAGNOSIS — R26.89 SCISSOR GAIT: ICD-10-CM

## 2021-09-03 DIAGNOSIS — G89.29 OTHER CHRONIC PAIN: ICD-10-CM

## 2021-09-03 DIAGNOSIS — R29.898 BILATERAL LEG WEAKNESS: ICD-10-CM

## 2021-09-03 DIAGNOSIS — Z11.52 ENCOUNTER FOR SCREENING LABORATORY TESTING FOR SEVERE ACUTE RESPIRATORY SYNDROME CORONAVIRUS 2 (SARS-COV-2): ICD-10-CM

## 2021-09-03 DIAGNOSIS — R50.9 FEVER AND CHILLS: ICD-10-CM

## 2021-09-03 LAB
ALBUMIN SERPL-MCNC: 3.7 G/DL (ref 3.4–5)
ALBUMIN UR-MCNC: NEGATIVE MG/DL
ALP SERPL-CCNC: 84 U/L (ref 40–150)
ALT SERPL W P-5'-P-CCNC: 44 U/L (ref 0–50)
AMPHETAMINES UR QL SCN: ABNORMAL
ANION GAP SERPL CALCULATED.3IONS-SCNC: 1 MMOL/L (ref 3–14)
APPEARANCE UR: CLEAR
APTT PPP: 25 SECONDS (ref 22–38)
AST SERPL W P-5'-P-CCNC: 46 U/L (ref 0–45)
BACTERIA #/AREA URNS HPF: ABNORMAL /HPF
BARBITURATES UR QL: ABNORMAL
BASOPHILS # BLD AUTO: 0 10E3/UL (ref 0–0.2)
BASOPHILS NFR BLD AUTO: 0 %
BENZODIAZ UR QL: ABNORMAL
BILIRUB SERPL-MCNC: 0.3 MG/DL (ref 0.2–1.3)
BILIRUB UR QL STRIP: NEGATIVE
BUN SERPL-MCNC: 13 MG/DL (ref 7–30)
CALCIUM SERPL-MCNC: 9.2 MG/DL (ref 8.5–10.1)
CANNABINOIDS UR QL SCN: ABNORMAL
CHLORIDE BLD-SCNC: 108 MMOL/L (ref 94–109)
CK SERPL-CCNC: 225 U/L (ref 30–225)
CO2 SERPL-SCNC: 30 MMOL/L (ref 20–32)
COCAINE UR QL: ABNORMAL
COLOR UR AUTO: ABNORMAL
CREAT SERPL-MCNC: 0.97 MG/DL (ref 0.52–1.04)
CRP SERPL-MCNC: 29 MG/L (ref 0–8)
DEPRECATED S PYO AG THROAT QL EIA: NEGATIVE
EOSINOPHIL # BLD AUTO: 0 10E3/UL (ref 0–0.7)
EOSINOPHIL NFR BLD AUTO: 1 %
ERYTHROCYTE [DISTWIDTH] IN BLOOD BY AUTOMATED COUNT: 12.1 % (ref 10–15)
GFR SERPL CREATININE-BSD FRML MDRD: 78 ML/MIN/1.73M2
GLUCOSE BLD-MCNC: 80 MG/DL (ref 70–99)
GLUCOSE UR STRIP-MCNC: NEGATIVE MG/DL
GROUP A STREP BY PCR: NOT DETECTED
HCG UR QL: NEGATIVE
HCT VFR BLD AUTO: 34.9 % (ref 35–47)
HGB BLD-MCNC: 12 G/DL (ref 11.7–15.7)
HGB UR QL STRIP: NEGATIVE
IMM GRANULOCYTES # BLD: 0 10E3/UL
IMM GRANULOCYTES NFR BLD: 0 %
INR PPP: 0.97 (ref 0.86–1.14)
KETONES UR STRIP-MCNC: NEGATIVE MG/DL
LEUKOCYTE ESTERASE UR QL STRIP: NEGATIVE
LYMPHOCYTES # BLD AUTO: 1.8 10E3/UL (ref 0.8–5.3)
LYMPHOCYTES NFR BLD AUTO: 30 %
MCH RBC QN AUTO: 33.2 PG (ref 26.5–33)
MCHC RBC AUTO-ENTMCNC: 34.4 G/DL (ref 31.5–36.5)
MCV RBC AUTO: 97 FL (ref 78–100)
MONOCYTES # BLD AUTO: 0.6 10E3/UL (ref 0–1.3)
MONOCYTES NFR BLD AUTO: 9 %
MUCOUS THREADS #/AREA URNS LPF: PRESENT /LPF
NEUTROPHILS # BLD AUTO: 3.7 10E3/UL (ref 1.6–8.3)
NEUTROPHILS NFR BLD AUTO: 60 %
NITRATE UR QL: NEGATIVE
NRBC # BLD AUTO: 0 10E3/UL
NRBC BLD AUTO-RTO: 0 /100
OPIATES UR QL SCN: ABNORMAL
PH UR STRIP: 7 [PH] (ref 5–7)
PLATELET # BLD AUTO: 214 10E3/UL (ref 150–450)
POTASSIUM BLD-SCNC: 4.2 MMOL/L (ref 3.4–5.3)
PROT SERPL-MCNC: 7.2 G/DL (ref 6.8–8.8)
RBC # BLD AUTO: 3.61 10E6/UL (ref 3.8–5.2)
RBC URINE: 1 /HPF
SARS-COV-2 RNA RESP QL NAA+PROBE: NEGATIVE
SODIUM SERPL-SCNC: 139 MMOL/L (ref 133–144)
SP GR UR STRIP: 1.01 (ref 1–1.03)
SQUAMOUS EPITHELIAL: 8 /HPF
UROBILINOGEN UR STRIP-MCNC: NORMAL MG/DL
WBC # BLD AUTO: 6.2 10E3/UL (ref 4–11)
WBC URINE: <1 /HPF

## 2021-09-03 PROCEDURE — 85610 PROTHROMBIN TIME: CPT | Performed by: FAMILY MEDICINE

## 2021-09-03 PROCEDURE — 80053 COMPREHEN METABOLIC PANEL: CPT | Performed by: FAMILY MEDICINE

## 2021-09-03 PROCEDURE — 81001 URINALYSIS AUTO W/SCOPE: CPT | Performed by: FAMILY MEDICINE

## 2021-09-03 PROCEDURE — 258N000003 HC RX IP 258 OP 636: Performed by: FAMILY MEDICINE

## 2021-09-03 PROCEDURE — 96374 THER/PROPH/DIAG INJ IV PUSH: CPT | Performed by: FAMILY MEDICINE

## 2021-09-03 PROCEDURE — 96361 HYDRATE IV INFUSION ADD-ON: CPT | Performed by: FAMILY MEDICINE

## 2021-09-03 PROCEDURE — 36415 COLL VENOUS BLD VENIPUNCTURE: CPT | Performed by: FAMILY MEDICINE

## 2021-09-03 PROCEDURE — U0003 INFECTIOUS AGENT DETECTION BY NUCLEIC ACID (DNA OR RNA); SEVERE ACUTE RESPIRATORY SYNDROME CORONAVIRUS 2 (SARS-COV-2) (CORONAVIRUS DISEASE [COVID-19]), AMPLIFIED PROBE TECHNIQUE, MAKING USE OF HIGH THROUGHPUT TECHNOLOGIES AS DESCRIBED BY CMS-2020-01-R: HCPCS | Performed by: FAMILY MEDICINE

## 2021-09-03 PROCEDURE — T1013 SIGN LANG/ORAL INTERPRETER: HCPCS | Mod: U3

## 2021-09-03 PROCEDURE — 81025 URINE PREGNANCY TEST: CPT | Performed by: FAMILY MEDICINE

## 2021-09-03 PROCEDURE — 250N000011 HC RX IP 250 OP 636: Performed by: INTERNAL MEDICINE

## 2021-09-03 PROCEDURE — 99223 1ST HOSP IP/OBS HIGH 75: CPT | Mod: AI | Performed by: INTERNAL MEDICINE

## 2021-09-03 PROCEDURE — 99285 EMERGENCY DEPT VISIT HI MDM: CPT | Mod: 25 | Performed by: FAMILY MEDICINE

## 2021-09-03 PROCEDURE — 85025 COMPLETE CBC W/AUTO DIFF WBC: CPT | Performed by: FAMILY MEDICINE

## 2021-09-03 PROCEDURE — 87651 STREP A DNA AMP PROBE: CPT | Performed by: FAMILY MEDICINE

## 2021-09-03 PROCEDURE — 250N000013 HC RX MED GY IP 250 OP 250 PS 637: Performed by: FAMILY MEDICINE

## 2021-09-03 PROCEDURE — 85730 THROMBOPLASTIN TIME PARTIAL: CPT | Performed by: FAMILY MEDICINE

## 2021-09-03 PROCEDURE — 87040 BLOOD CULTURE FOR BACTERIA: CPT | Performed by: FAMILY MEDICINE

## 2021-09-03 PROCEDURE — 99285 EMERGENCY DEPT VISIT HI MDM: CPT | Performed by: FAMILY MEDICINE

## 2021-09-03 PROCEDURE — 80307 DRUG TEST PRSMV CHEM ANLYZR: CPT | Performed by: FAMILY MEDICINE

## 2021-09-03 PROCEDURE — 96376 TX/PRO/DX INJ SAME DRUG ADON: CPT | Performed by: FAMILY MEDICINE

## 2021-09-03 PROCEDURE — 120N000002 HC R&B MED SURG/OB UMMC

## 2021-09-03 PROCEDURE — 93971 EXTREMITY STUDY: CPT | Mod: 26 | Performed by: RADIOLOGY

## 2021-09-03 PROCEDURE — 93971 EXTREMITY STUDY: CPT | Mod: LT

## 2021-09-03 PROCEDURE — 86140 C-REACTIVE PROTEIN: CPT | Performed by: FAMILY MEDICINE

## 2021-09-03 PROCEDURE — 82550 ASSAY OF CK (CPK): CPT | Performed by: FAMILY MEDICINE

## 2021-09-03 PROCEDURE — C9803 HOPD COVID-19 SPEC COLLECT: HCPCS | Performed by: FAMILY MEDICINE

## 2021-09-03 RX ORDER — ACETAMINOPHEN 325 MG/1
650 TABLET ORAL EVERY 4 HOURS PRN
Status: DISCONTINUED | OUTPATIENT
Start: 2021-09-03 | End: 2021-09-04 | Stop reason: HOSPADM

## 2021-09-03 RX ORDER — ACETAMINOPHEN 500 MG
1000 TABLET ORAL ONCE
Status: COMPLETED | OUTPATIENT
Start: 2021-09-03 | End: 2021-09-03

## 2021-09-03 RX ORDER — NALOXONE HYDROCHLORIDE 0.4 MG/ML
0.4 INJECTION, SOLUTION INTRAMUSCULAR; INTRAVENOUS; SUBCUTANEOUS
Status: DISCONTINUED | OUTPATIENT
Start: 2021-09-03 | End: 2021-09-04 | Stop reason: HOSPADM

## 2021-09-03 RX ORDER — HYDROMORPHONE HYDROCHLORIDE 2 MG/1
2 TABLET ORAL EVERY 4 HOURS PRN
Status: DISCONTINUED | OUTPATIENT
Start: 2021-09-03 | End: 2021-09-04 | Stop reason: HOSPADM

## 2021-09-03 RX ORDER — BUSPIRONE HYDROCHLORIDE 15 MG/1
15 TABLET ORAL 3 TIMES DAILY
Status: DISCONTINUED | OUTPATIENT
Start: 2021-09-03 | End: 2021-09-04 | Stop reason: HOSPADM

## 2021-09-03 RX ORDER — CLONAZEPAM 0.5 MG/1
1 TABLET ORAL 2 TIMES DAILY PRN
Status: DISCONTINUED | OUTPATIENT
Start: 2021-09-03 | End: 2021-09-04 | Stop reason: HOSPADM

## 2021-09-03 RX ORDER — LIDOCAINE 40 MG/G
CREAM TOPICAL
Status: DISCONTINUED | OUTPATIENT
Start: 2021-09-03 | End: 2021-09-04 | Stop reason: HOSPADM

## 2021-09-03 RX ORDER — CYCLOBENZAPRINE HCL 10 MG
10 TABLET ORAL 3 TIMES DAILY PRN
Status: DISCONTINUED | OUTPATIENT
Start: 2021-09-03 | End: 2021-09-04 | Stop reason: HOSPADM

## 2021-09-03 RX ORDER — PANTOPRAZOLE SODIUM 40 MG/1
40 TABLET, DELAYED RELEASE ORAL 2 TIMES DAILY
Status: DISCONTINUED | OUTPATIENT
Start: 2021-09-03 | End: 2021-09-04 | Stop reason: HOSPADM

## 2021-09-03 RX ORDER — DICYCLOMINE HCL 20 MG
20 TABLET ORAL EVERY 6 HOURS
Status: DISCONTINUED | OUTPATIENT
Start: 2021-09-03 | End: 2021-09-04 | Stop reason: HOSPADM

## 2021-09-03 RX ORDER — HYDROMORPHONE HCL IN WATER/PF 6 MG/30 ML
.2-.4 PATIENT CONTROLLED ANALGESIA SYRINGE INTRAVENOUS
Status: DISCONTINUED | OUTPATIENT
Start: 2021-09-03 | End: 2021-09-04 | Stop reason: HOSPADM

## 2021-09-03 RX ORDER — DULOXETIN HYDROCHLORIDE 60 MG/1
60 CAPSULE, DELAYED RELEASE ORAL DAILY
Status: DISCONTINUED | OUTPATIENT
Start: 2021-09-04 | End: 2021-09-04 | Stop reason: HOSPADM

## 2021-09-03 RX ORDER — ALBUTEROL SULFATE 90 UG/1
2 AEROSOL, METERED RESPIRATORY (INHALATION) EVERY 6 HOURS PRN
Status: DISCONTINUED | OUTPATIENT
Start: 2021-09-03 | End: 2021-09-04 | Stop reason: HOSPADM

## 2021-09-03 RX ORDER — ARIPIPRAZOLE 10 MG/1
30 TABLET ORAL AT BEDTIME
Status: DISCONTINUED | OUTPATIENT
Start: 2021-09-03 | End: 2021-09-04 | Stop reason: HOSPADM

## 2021-09-03 RX ORDER — ONDANSETRON 2 MG/ML
4 INJECTION INTRAMUSCULAR; INTRAVENOUS EVERY 6 HOURS PRN
Status: DISCONTINUED | OUTPATIENT
Start: 2021-09-03 | End: 2021-09-04 | Stop reason: HOSPADM

## 2021-09-03 RX ORDER — ONDANSETRON 4 MG/1
4 TABLET, ORALLY DISINTEGRATING ORAL EVERY 6 HOURS PRN
Status: DISCONTINUED | OUTPATIENT
Start: 2021-09-03 | End: 2021-09-04 | Stop reason: HOSPADM

## 2021-09-03 RX ORDER — EPINEPHRINE 0.3 MG/.3ML
0.3 INJECTION SUBCUTANEOUS
Status: DISCONTINUED | OUTPATIENT
Start: 2021-09-03 | End: 2021-09-04 | Stop reason: HOSPADM

## 2021-09-03 RX ORDER — SODIUM CHLORIDE 9 MG/ML
INJECTION, SOLUTION INTRAVENOUS CONTINUOUS
Status: DISCONTINUED | OUTPATIENT
Start: 2021-09-03 | End: 2021-09-04 | Stop reason: HOSPADM

## 2021-09-03 RX ORDER — BUPROPION HYDROCHLORIDE 300 MG/1
300 TABLET ORAL EVERY MORNING
Status: DISCONTINUED | OUTPATIENT
Start: 2021-09-04 | End: 2021-09-04 | Stop reason: HOSPADM

## 2021-09-03 RX ORDER — GABAPENTIN 300 MG/1
300 CAPSULE ORAL 3 TIMES DAILY
Status: DISCONTINUED | OUTPATIENT
Start: 2021-09-03 | End: 2021-09-04 | Stop reason: HOSPADM

## 2021-09-03 RX ORDER — METOPROLOL SUCCINATE 50 MG/1
50 TABLET, EXTENDED RELEASE ORAL DAILY
Status: DISCONTINUED | OUTPATIENT
Start: 2021-09-04 | End: 2021-09-04 | Stop reason: HOSPADM

## 2021-09-03 RX ORDER — NALOXONE HYDROCHLORIDE 0.4 MG/ML
0.2 INJECTION, SOLUTION INTRAMUSCULAR; INTRAVENOUS; SUBCUTANEOUS
Status: DISCONTINUED | OUTPATIENT
Start: 2021-09-03 | End: 2021-09-04 | Stop reason: HOSPADM

## 2021-09-03 RX ORDER — LIDOCAINE HYDROCHLORIDE 20 MG/ML
15 SOLUTION OROPHARYNGEAL EVERY 4 HOURS PRN
Status: DISCONTINUED | OUTPATIENT
Start: 2021-09-03 | End: 2021-09-04 | Stop reason: HOSPADM

## 2021-09-03 RX ORDER — MESALAMINE 500 MG/1
1000 CAPSULE, EXTENDED RELEASE ORAL 3 TIMES DAILY
Status: DISCONTINUED | OUTPATIENT
Start: 2021-09-03 | End: 2021-09-04 | Stop reason: HOSPADM

## 2021-09-03 RX ORDER — POLYETHYLENE GLYCOL 3350 17 G/17G
17 POWDER, FOR SOLUTION ORAL DAILY PRN
Status: DISCONTINUED | OUTPATIENT
Start: 2021-09-03 | End: 2021-09-04 | Stop reason: HOSPADM

## 2021-09-03 RX ADMIN — SODIUM CHLORIDE 1000 ML: 9 INJECTION, SOLUTION INTRAVENOUS at 14:06

## 2021-09-03 RX ADMIN — SODIUM CHLORIDE: 9 INJECTION, SOLUTION INTRAVENOUS at 16:52

## 2021-09-03 RX ADMIN — HYDROMORPHONE HYDROCHLORIDE 0.2 MG: 0.2 INJECTION, SOLUTION INTRAMUSCULAR; INTRAVENOUS; SUBCUTANEOUS at 19:55

## 2021-09-03 RX ADMIN — HYDROMORPHONE HYDROCHLORIDE 0.2 MG: 0.2 INJECTION, SOLUTION INTRAMUSCULAR; INTRAVENOUS; SUBCUTANEOUS at 19:31

## 2021-09-03 RX ADMIN — HYDROMORPHONE HYDROCHLORIDE 0.4 MG: 0.2 INJECTION, SOLUTION INTRAMUSCULAR; INTRAVENOUS; SUBCUTANEOUS at 22:40

## 2021-09-03 RX ADMIN — ACETAMINOPHEN 1000 MG: 500 TABLET, FILM COATED ORAL at 14:06

## 2021-09-03 RX ADMIN — ONDANSETRON 4 MG: 4 TABLET, ORALLY DISINTEGRATING ORAL at 20:17

## 2021-09-03 ASSESSMENT — ENCOUNTER SYMPTOMS
WEAKNESS: 1
DYSURIA: 0
SINUS PAIN: 0
DIFFICULTY URINATING: 0
DIARRHEA: 0
CONFUSION: 0
HEADACHES: 0
COUGH: 0
LIGHT-HEADEDNESS: 0
FATIGUE: 1
VOMITING: 0
SHORTNESS OF BREATH: 0
BACK PAIN: 1
NAUSEA: 1
AGITATION: 0
SORE THROAT: 0
FEVER: 1
DECREASED CONCENTRATION: 1
HALLUCINATIONS: 0
COLOR CHANGE: 1
TROUBLE SWALLOWING: 0
BRUISES/BLEEDS EASILY: 0
SINUS PRESSURE: 0
BLOOD IN STOOL: 0
EYE REDNESS: 0
MYALGIAS: 1
ACTIVITY CHANGE: 1
ARTHRALGIAS: 0
ABDOMINAL PAIN: 0
WOUND: 0
HEMATURIA: 0
DYSPHORIC MOOD: 1
NECK STIFFNESS: 0

## 2021-09-03 NOTE — H&P
[facility]    History and Physical - Hospitalist Service       Date of Admission:  9/3/2021    Assessment & Plan      A: Patient is a 30 y/o woman who has chronic back pain, Crohn's disease and bipolar disorder. Patient last was hospitalized from 31-Aug-2021 to 01-Sep-2021. Patient returns with fever, leg weakness and leg pain. Cause of patient's reported symptoms is currently unclear and it is unclear if patient's reported fever and elevated CRP is from an infectious source or has another cause.    From chart review, patient might have some drug-seeking behaviours. From chart review, patient had reported easy bruising in the past.    P:  1.) Fever of unclear etiology: Given the absence of other overt symptoms of infection, will not start antibiotics at this point in time. Will ensure that 2 sets of blood cultures are sent. Will consult Infectious Disease. Will monitor for recurrent fever. As patient had recent chest and abdominal imaging, will not repeat these in the absence of respiratory and gastrointestinal symptoms. Will check venous dopplers in both legs given reported 'lumps' in legs.  2.) Bilateral lower leg weakness and pain: Given that patient had recent MRI lumbar spine, this will not be repeated. Will continue patient's home hydromorphone 2 mg orally ever 4 hours and will order hydromorphone 0.2-0.4 mg IV every 3 hours as needed for now. Will not further escalate opoid medications without more overt cause of pain in light of past drug-seeking behaviours.  3.) Hypertension: Patient on metoprolol.  4.) Crohn's disease: Patient to continue mesalamine.  5.) Mild intermittent asthma, patient appears compensated: Monitoring for new symptoms.  6.) GERD: Patient on omeprazole.  7.) Chronic back pain: Patient to continue gabapentin and cyclobenzaprine.  8.) Depression and anxiety: Patient to continue wellbutrin, buspar and klonopin.  9.) Reported easy bruising:  Further evaluation as outpatient.  10.) Patient presents with recurrent fever as well as recurrent bilateral leg weakness of unclear cause. Patient will require close monitoring for evidence of infection and for evidence of sepsis. I anticipate that patient will require at least 2 midnights of inpatient hospital services.  11.) Time spent in patient care was 80 minutes: 25 minutes spent at bedside, 40 minutes spent reviewing past records and 15 minutes formulating plan.      Sukumar Schroeder MD  [facility]  Securely message with the Vocera Web Console (learn more here)  Text page via [facility] Paging/Directory    ______________________________________________________________________    Chief Complaint     Fever    History of Present Illness     Note: Patient was communicated with through a .    Patient is a 32 y/o woman who has chronic back pain and Crohn's disease. Patient last was hospitalized from 31-Aug-2021 to 01-Sep-2021. Patient returns with fever. Patient reports onset of fever was this morning and states that she measured a temperature of 102.2 F. Patient also notes onset of nausea and bilateral leg pain this morning. Patient also notes leg numbness and weakness and states that this kept her from getting out of bed.    Patient states that recent hospitalization also was for bilateral leg weakness but states that she did not have leg pain at that point in time. Patient notes that she had a fever before that hospitalization as well. Patient notes no urinary or stool incontinence. Patient notes no rash. Patient notes no cough and no dyspnea. Patient notes nausea but no vomiting. Patient notes no cough and no dyspnea. Patient reports having 'lumps' in her legs but notes no injury to her legs.    Patient notes no other problems at this point in time.    From chart review and review of Care Everywhere, patient has had multiple ED visits and  hospitalizations in the past several months for various symptoms. Of note, patient was hospitalized from 30-May-2021 to 31-May-2021 at [facility] with pneumonia and was discharged to home. Patient was also diagnosed with pneumonia on 07-Aug-2021 at [facility]'s ED. Patient was also admitted to [facility] for pneumonia on 09-Aug-2021 but then let against medical advice; patient then presented to [facility]'s ED on 09-Aug-2021 but apparently left against medical advice when she was not given dilaudid. From review of ED notes, patient had presented to EDs and had been discharged on a number of occasions but patient also absconded from EDs on 12-Jun-2021, 13-Jun-2021 and 29-Aug-2021. Patient had just been discharged from [facility] on 29-Aug-2021. Patient also apparently had asked for various opioid prescriptions e.g. robitussin with codeine and vicodin on some ED discharges as well.    Review of Systems    - 10 point review of systems unremarkable aside from what was mentioned in HPI.    Past Medical History    I have reviewed this patient's medical history and updated it with pertinent information if needed.   Past Medical History:   Diagnosis Date     Abdominal pain 10/31- 11/4/2005    [facility] admit for Crohn's     Allergic rhinitis, cause unspecified     Allergic rhinitis     Arthritis      C. difficile diarrhea     Past, no current diarrhea.     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, without mention of intractable epilepsy     pseudoseizures diagnosed after extensive neurologic eval     Migraine 07/21/12    D/C 07/22/12-[facility]     Migraine, unspecified, without mention of intractable migraine without mention of status migrainosus     Migraine     Mild intermittent asthma     mild intermittent     Mycoplasma infection in conditions classified elsewhere and of unspecified site      Other chronic pain     Back pain for 6 years     Renal disease     Kidney stones     Unspecified hearing loss     congenital hearing loss       Past Surgical History   I have reviewed this patient's surgical history and updated it with pertinent information if needed.  Past Surgical History:   Procedure Laterality Date     AS REMOVAL OF COCCYX  10/18/2017     C FUSION OF SACROILIAC JOINT  10/26/2018     COLONOSCOPY  7/1/2009    [facility], [facility], [place].     COLONOSCOPY N/A 7/17/2019    Procedure: Colonoscopy, With Polypectomy And Biopsy;  Surgeon: Edwin Conde MD;  Location: [facility] OR     COLONOSCOPY WITH CO2 INSUFFLATION N/A 7/17/2019    Procedure: COLONOSCOPY, WITH CO2 INSUFFLATION;  Surgeon: Edwin Conde MD;  Location: [facility] OR     COMBINED ESOPHAGOSCOPY, GASTROSCOPY, DUODENOSCOPY (EGD) WITH CO2 INSUFFLATION N/A 4/12/2019    Procedure: COMBINED ESOPHAGOSCOPY, GASTROSCOPY, DUODENOSCOPY (EGD) WITH CO2 INSUFFLATION;  Surgeon: Edwin Conde MD;  Location: [facility] OR     ESOPHAGOSCOPY, GASTROSCOPY, DUODENOSCOPY (EGD), COMBINED N/A 4/12/2019    Procedure: Combined Esophagoscopy, Gastroscopy, Duodenoscopy (Egd), Biopsy Single Or Multiple;  Surgeon: Edwin Conde MD;  Location: [facility] OR     FISTULOTOMY RECTUM N/A 4/30/2020    Procedure: EXAM UNDER ANESTHESIA, ANUS, parital fistulotomy;  Surgeon: Valentin Sanchez MD;  Location: [facility] OR     FUSION SPINE POSTERIOR MINIMALLY INVASIVE ONE LEVEL N/A 2/23/2017    Procedure: FUSION SPINE POSTERIOR MINIMALLY  INVASIVE ONE LEVEL;  L4-5 Oblique Lateral Lumbar Interbody Fusion   Epidural steroid injection.   Transpedicular Bone marrow aspiration;  Surgeon: Jeniffer Eugene MD;  Location: [facility] OR     HC EEG AWAKE AND SLEEP      abnormal     HC MRI BRAIN W/O CONTRAST  12/00    normal     HC REMOVAL GALLBLADDER  8/5/2009    [facility].     HC UGI ENDOSCOPY DIAG W BIOPSY  11/11/09    Normal esophagus     ORTHOPEDIC SURGERY  October 19,2011    diskectomy L4-L5     [facility] COLONOSCOPY THRU STOMA WITH BIOPSY  10/29/2003    Impression is that of normal appearing colonoscopy, without evidence of rectal bleeding.     [facility] COLONOSCOPY THRU STOMA, DIAGNOSTIC  10/00    normal     [facility] COLONOSCOPY THRU STOMA, DIAGNOSTIC  Oct 2009    Dr López- normal     [facility]  7/00, 10/00    mild chronic esophagitis and duodenitis, neg H pylori     [facility] ENDOS[facility], SIMPLE EXAM  01/20/2005    Esophagogastroduodenoscopy, colonoscopy with biopsies.  [facility]     [facility]GI ENDOSCOPY, SIMPLE EXAM  7/1/2009    [facility], [facility].     [facility] UGI ENDOSCOPY, SIMPLE EXAM  11/11/2009    attempted upper GI, pt. could not tolerate procedure:[facility]       Social History   I have reviewed this patient's social history and updated it with pertinent information if needed.  Social History     Tobacco Use     Smoking status: Current Every Day Smoker     Packs/day: 0.25     Years: 5.00     Pack years: 1.25     Types: Cigarettes     Smokeless tobacco: Never Used   Vaping Use     Vaping Use: Some days     Substances: CBD     Devices: Disposable, Pre-filled or refillable cartridge   Substance Use Topics     Alcohol use: Not Currently     Comment: Not since last July 2018     Drug use: Not Currently     Types: Marijuana     Comment: Medical Marijuana currently-- More CBD       Family History   I have reviewed this patient's family history and updated it with pertinent information if  needed.  Family History   Problem Relation Age of Onset     Gastrointestinal Disease Brother         severe Crohn's     Neurologic Disorder Brother         Seizures post head injury     Depression Brother      Substance Abuse Brother      Genitourinary Problems Father         kidney stones     Diabetes Father      Heart Disease Father         Open heart surgery     Breast Cancer Maternal Grandmother      Parkinsonism Maternal Grandmother      Cerebrovascular Disease Paternal Grandmother      Cancer Maternal Grandfather         Lung     Cardiovascular Paternal Grandfather         Heart Attack     Substance Abuse Mother         in recovery x1 year      Lung Cancer Paternal Uncle      Cancer Paternal Uncle      Lung Cancer Maternal Uncle      Colon Cancer Maternal Uncle        Prior to Admission Medications   Prior to Admission Medications   Prescriptions Last Dose Informant Patient Reported? Taking?   ARIPiprazole (ABILIFY) 30 MG tablet  Self No No   Sig: Take 1 tablet (30 mg) by mouth At Bedtime   DULoxetine (CYMBALTA) 30 MG capsule  Self No No   Sig: Take 2 capsules (60 mg) by mouth daily   EPINEPHrine (ANY BX GENERIC EQUIV) 0.3 MG/0.3ML injection 2-pack  Self No No   Sig: Inject 0.3 mLs (0.3 mg) into the muscle once as needed for anaphylaxis   HYDROmorphone (DILAUDID) 2 MG tablet   No No   Sig: Take 1 tablet (2 mg) by mouth every 4 hours as needed for moderate to severe pain   albuterol (PROAIR HFA/PROVENTIL HFA/VENTOLIN HFA) 108 (90 Base) MCG/ACT inhaler  Self No No   Sig: Inhale 2 puffs into the lungs every 6 hours as needed for shortness of breath / dyspnea or wheezing   azelastine (OPTIVAR) 0.05 % ophthalmic solution  Self No No   Sig: Place 1 drop into both eyes 2 times daily   Patient not taking: Reported on 8/25/2021   buPROPion (WELLBUTRIN XL) 300 MG 24 hr tablet  Self No No   Sig: Take 1 tablet (300 mg) by mouth every morning   busPIRone (BUSPAR) 15 MG tablet   Yes No   Sig: Take 15 mg by mouth 3 times  daily   clonazePAM (KLONOPIN) 1 MG tablet   Yes No   Sig: Take 1 mg by mouth 2 times daily as needed for anxiety   cyclobenzaprine (FLEXERIL) 10 MG tablet   Yes No   Sig: Take 10 mg by mouth 3 times daily as needed   dicyclomine (BENTYL) 20 MG tablet  Self No No   Sig: Take 1 tablet (20 mg) by mouth every 6 hours   furosemide (LASIX) 20 MG tablet   Yes No   Sig: Take 1 tablet (20 mg) by mouth daily   gabapentin (NEURONTIN) 300 MG capsule  Self No No   Sig: Take 1 capsule (300 mg) by mouth 3 times daily   lidocaine (XYLOCAINE) 2 % solution  Self No No   Sig: Swish and swallow 15 mLs in mouth every 4 hours as needed for other (GERD) ; Max 8 doses/24 hour period. Mix with 15 mLs Maalox or Mylanta.   medroxyPROGESTERone (DEPO-PROVERA) 150 MG/ML IM injection  Self No No   Sig: Inject 1 mL (150 mg) into the muscle every 3 months   mesalamine ER (PENTASA) 500 MG CR capsule   Yes No   Sig: Take 2 capsules (1,000 mg) by mouth 3 times daily   metoprolol succinate ER (TOPROL-XL) 50 MG 24 hr tablet   Yes No   Sig: Take 50 mg by mouth daily   ondansetron (ZOFRAN-ODT) 4 MG ODT tab   No No   Sig: Take 1 tablet (4 mg) by mouth every 6 hours as needed for nausea   pantoprazole (PROTONIX) 40 MG EC tablet  Self No No   Sig: Take 1 tablet (40 mg) by mouth 2 times daily Take 30-60 minutes before a meal.   polyethylene glycol (MIRALAX) 17 g packet   No No   Sig: Take 17 g by mouth daily as needed for constipation   rizatriptan (MAXALT-MLT) 5 MG ODT  Self No No   Sig: Take 1-2 tablets (5-10 mg) by mouth at onset of headache for migraine May repeat in 2 hours. Max 6 tablets/24 hours.   Patient not taking: Reported on 8/25/2021   sucralfate (CARAFATE) 1 GM tablet  Self No No   Sig: Take 1 tablet (1 g) by mouth 4 times daily as needed (heartburn)      Facility-Administered Medications: None     Allergies   Allergies   Allergen Reactions     Iohexol Hives     Other reaction(s): Hives  IV contrast; patient states she tolerates contrast if she  gets benadryl before  IV contrast; patient states she tolerates contrast if she gets benadryl before     Other Environmental Allergy Rash     Plastic tape     Baclofen      hives     Bees Hives, Swelling and Difficulty breathing     Contrast Dye      Hives,   Updated 5/10/2016 CT Contrast.     Iodine Hives     Metoclopramide      Other reaction(s): Tremors  LW Reaction: shaking/sweating     Midazolam      Other reaction(s): Agitation     Monosodium Glutamate      Morphine Other (See Comments)     Difficulty with urination     Nsaids Other (See Comments)     GI BLEED x2     Other (Do Not Use) Other (See Comments)     Xanaflex- pt becomes disoriented and loses bladder control     Reglan [Metoclopramide Hcl] Other (See Comments)     shaking     Soma [Carisoprodol] Visual Disturbance     Sleep walking     Tizanidine      Topamax Other (See Comments)     Topamax [Topiramate] Nausea     Tingling  GI/Vomit     Tramadol      Severe Headache, Seizure Risk     Tylenol W/Codeine [Acetaminophen-Codeine] Nausea and Itching     Tylenol 3     Versed Other (See Comments)     Zolmitriptan      Makes face feel like its twitching     Droperidol Anxiety     Flu Virus Vaccine Rash      Arm swelling       Physical Exam   Vital Signs: Temp: 98.7  F (37.1  C) Temp src: Oral BP: 111/78 Pulse: 85   Resp: 18 SpO2: 99 % O2 Device: None (Room air)      General: Patient comfortable, NAD.   Eyes: No scleral icterus or conjunctival injection.  Cardiovascular: Heart somewhat fast. RRR, S1 S2 w/o murmurs.  Lungs: Breath sounds present. No crackles/wheezes heard.  Gastrointestinal: Abdomen soft, nontender.  Musculoskeletal: No visible joint swelling or erythema.  Skin: Small areas of slight erythema on both lower legs. Some bruising present; some discomfort and fullness with palpation of areas near areas of bruising.  Neuro: Cranial nerves II-XII very grossly intact.  Psych: Affect generally pleasant.    Data   Labs noted. WBC 6.2; Creatinine 0.97;  CRP 29.0    Recent imaging results:     On 30-Aug-2021    CT abdomen/pelvis:  1.  Left basilar and lingular pneumonia or atelectasis, unchanged.  2.  Tiny nonobstructing stones both kidneys.  3.  Exam otherwise unremarkable.    CXR: Minimal change compared to previous chest x-ray. Left hemidiaphragm elevated with air distended stomach beneath diaphragm. There is linear infiltrate at right base most consistent with compressive atelectasis or scar. Can't exclude some   degree of left lower lobe pneumonia but this been minimal overall change. Left upper lobe and right lung remain clear. Heart size normal. Thoracic level stimulator leads.    On 28-Aug-2021, MRI lumbar spine:  1. Post surgical changes of L4-5 instrument posterior fusion with discectomy, left SI fusion, and spinal cord simulator placement.  2. Minimal degenerative changes of the lumbar spine without spinal canal or neural foraminal narrowing.    On 27-Aug-2021, x-ray lumbar spine:  1. Post surgical changes of L4-5 instrument posterior fusion with discectomy, left SI fusion, and spinal cord simulator placement.  2. Minimal degenerative changes of the lumbar spine without spinal canal or neural foraminal narrowing.    On 24-Aug-2021:     CT chest, abdomen, pelvis:  1.  No acute findings. No evidence of a thoracic or abdominal aortic aneurysm or intramural hematoma. Note that contrast was not administered due to patient allergy. This precludes the evaluation for an aortic dissection or pulmonary artery embolism.  2.  Punctate nonobstructing left renal calculi.    CT head without contrast:  1. No CT findings of acute intracranial process.  2. Small nonspecific polypoid soft tissue lesion in the right nasal cavity, as described. Recommend correlation with direct inspection.

## 2021-09-03 NOTE — ED TRIAGE NOTES
Brought in by EMS. Per EMS, PT has a history of being blacklisted by numerous hospitals.  Pt states she is in pain, but slept the entire time from [place] to [place].

## 2021-09-03 NOTE — TELEPHONE ENCOUNTER
BACKGROUND:   Muscle aches, fever, fatigue    SITUATION:   Patient is calling today stating that she is unable to walk. She can't get out of bed. She is in a lot of pain that is rated at a 7/10 when laying down, with walking 8/10. The patient describes having a fever of 101.88. The patient states that she is unable to keep her head up and she feels extremely fatigued.     HOME TREATMENTS:  Tylenol    HEALTH HISTORY:  NA        PLAN:   Patient was advised to go to the ER. She doesn't have a ride. The patient was advised to call the ambulance. She requested that I call the ambulance and inform them to take her to [place] or else she isn't going. I informed her that I can not call the ambulance and request them to bring her to [place]. The patient got mad and hung up.          Jose Finney, EDYTAN, RN, N  [place]  September 3, 2021    Reason for Disposition    Patient sounds very sick or weak to the triager    Additional Information    Negative: Difficult to awaken or acting confused (e.g., disoriented, slurred speech)    Negative: Pale cold skin and very weak (can't stand)    Negative: Difficulty breathing and bluish (or gray) lips or face    Negative: New onset rash with purple (or blood-colored) spots or dots    Negative: Sounds like a life-threatening emergency to the triager    Negative: Fever onset within 24 hours of receiving vaccine    Negative: Fever within 14 days of COVID-19 Exposure    Negative: Pregnant    Negative: Postpartum (from 0 to 6 weeks after delivery)    Protocols used: FEVER-A-[place]

## 2021-09-04 ENCOUNTER — NURSE TRIAGE (OUTPATIENT)
Dept: NURSING | Facility: CLINIC | Age: 31
End: 2021-09-04

## 2021-09-04 PROCEDURE — 250N000013 HC RX MED GY IP 250 OP 250 PS 637: Performed by: INTERNAL MEDICINE

## 2021-09-04 RX ADMIN — HYDROMORPHONE HYDROCHLORIDE 2 MG: 2 TABLET ORAL at 00:21

## 2021-09-04 NOTE — PLAN OF CARE
"  Pt is alert and oriented x4. Admitted in [facility]. Primarily communicates through ALS.  is present in all interactions. VSS and afebrile.Got up and walked with steady feet from transfer cart to the bed.Sleepy but arousable. She was  uncooperative when she got to her room. Verbalizing AMA and that she is not coming back to the this hospital. Seeking PRN IV narc meds. Informed her that it is not due to give yet. Pt got agitated . Offered tylenol, Gabapentin, flexeril and PO Dilaudid. Agreeable to take PO dilaudid as long as it is 4mg or else she would leave AMA. MD informed with no changes of meds. Was able to give PRN IV Dilaudid but continues to verbalize AMA. Pt stated pain meds doesn't do much for her pain. Encourage and educated her about taking the rest of her pain meds yet continous to decline. Refused blood drawn, approached 2x.Was able to give IV dilaudid PRN 1x.  Came up with the plan about pain meds overnight. Inconsistent with the plan, agreeable at first then will get agitated next. Pt would claim AMA every interactions. Pt denies suicidal thoughts,ideas and harming self. Mom Aysha called and updated about the situation. Pt offered PO Dilaudid after midnight. Got mad about the dosage and decided to leave AMA after taking the pill. Asked if she has a ride and pt responded \" [employer]\". Saw and confirmed the ride was already set up. MD updated, PIV removed and AMA paper signed.   "

## 2021-09-04 NOTE — ED NOTES
"Pt primarily speaks using ASL.  present for all interactions with pt and translated for every conversation. Pt was  reporting pain between [id number]. Pt was drowsy and not easily aroused when nurse came in to assess pain during that time. When pt was more alert, pain meds were given. Pt was history for drug seeking behaviors. Max dose of prescribed pain meds were given with no effect. Pt started becoming more agitated when she could not receive any more pain meds. Pt c/o nausea and was given zofran which was effect. Pt was on the call light frequently (every 10-15 minutes at some points). Pt reported not wanting to go to US without more pain meds and being done with her cares here. Provider was updated on pt requests and behaviors, and AMA paperwork was brought in for the pt. Pt reminded that max dose of pain meds were administered within an hour and no more could be given. Charge nurse informed on pt behaviors and spoke with pt who agreed to go to US. Pt brought to US where pt became more agitated due to pain. Pt refused US \"if any pressure would be applied to skin\". Educated on how US imaging is done, and pt reported having them done in the past. Offered to bring pt to admitting unit and skip the US for now. Pt accused nurse of \"forcing\" pt do US. Reassured pt it was up to her to have it done and it was beneficial for her to have it done, so we could figure out what was causing her pain. Pt reported she would do it, but only if nurse would leave. Nurse left the room.   "

## 2021-09-04 NOTE — PHARMACY-ADMISSION MEDICATION HISTORY
Admission Medication History Completed by Pharmacy    See [facility] Admission Navigator for allergy information, preferred outpatient pharmacy, prior to admission medications and immunization status.     Medication history sources:  Admission Medication History note 8/31/21    Pertinent changes made to PTA medication list:  Added: N/A  Deleted: N/A  Changed: N/A    Additional medication history information:   - Patient not interviewed as part of this medication history in light of recent discharge. Please discuss any OTC/non-prescription medication use and last doses with the patient that may not be reflected in the list below.    Prior to Admission medications    Medication Sig Last Dose Taking? Auth Provider   albuterol (PROAIR HFA/PROVENTIL HFA/VENTOLIN HFA) 108 (90 Base) MCG/ACT inhaler Inhale 2 puffs into the lungs every 6 hours as needed for shortness of breath / dyspnea or wheezing   Aura Rosario PA-C   ARIPiprazole (ABILIFY) 30 MG tablet Take 1 tablet (30 mg) by mouth At Bedtime   Aura Rosario PA-C   azelastine (OPTIVAR) 0.05 % ophthalmic solution Place 1 drop into both eyes 2 times daily  Patient not taking: Reported on 8/25/2021   Aura Rosario PA-C   buPROPion (WELLBUTRIN XL) 300 MG 24 hr tablet Take 1 tablet (300 mg) by mouth every morning   Aura Rosario PA-C   busPIRone (BUSPAR) 15 MG tablet Take 15 mg by mouth 3 times daily   Unknown, Entered By History   clonazePAM (KLONOPIN) 1 MG tablet Take 1 mg by mouth 2 times daily as needed for anxiety   Unknown, Entered By History   cyclobenzaprine (FLEXERIL) 10 MG tablet Take 10 mg by mouth 3 times daily as needed   Unknown, Entered By History   dicyclomine (BENTYL) 20 MG tablet Take 1 tablet (20 mg) by mouth every 6 hours   Aura Rosario PA-C   DULoxetine (CYMBALTA) 30 MG capsule Take 2 capsules (60 mg) by mouth daily   Aura Rosario PA-C   EPINEPHrine  (ANY BX GENERIC EQUIV) 0.3 MG/0.3ML injection 2-pack Inject 0.3 mLs (0.3 mg) into the muscle once as needed for anaphylaxis   Aura Rosario PA-C   furosemide (LASIX) 20 MG tablet Take 1 tablet (20 mg) by mouth daily   Oneal Winchester MD   gabapentin (NEURONTIN) 300 MG capsule Take 1 capsule (300 mg) by mouth 3 times daily   Aura Rosario PA-C   HYDROmorphone (DILAUDID) 2 MG tablet Take 1 tablet (2 mg) by mouth every 4 hours as needed for moderate to severe pain   Oneal Winchester MD   lidocaine (XYLOCAINE) 2 % solution Swish and swallow 15 mLs in mouth every 4 hours as needed for other (GERD) ; Max 8 doses/24 hour period. Mix with 15 mLs Maalox or Mylanta.   Aura Rosario PA-C   medroxyPROGESTERone (DEPO-PROVERA) 150 MG/ML IM injection Inject 1 mL (150 mg) into the muscle every 3 months   Aura Rosario PA-C   mesalamine ER (PENTASA) 500 MG CR capsule Take 2 capsules (1,000 mg) by mouth 3 times daily   Oneal Winchester MD   metoprolol succinate ER (TOPROL-XL) 50 MG 24 hr tablet Take 50 mg by mouth daily   Unknown, Entered By History   ondansetron (ZOFRAN-ODT) 4 MG ODT tab Take 1 tablet (4 mg) by mouth every 6 hours as needed for nausea   Oneal Winchester MD   pantoprazole (PROTONIX) 40 MG EC tablet Take 1 tablet (40 mg) by mouth 2 times daily Take 30-60 minutes before a meal.   Aura Rosario PA-C   polyethylene glycol (MIRALAX) 17 g packet Take 17 g by mouth daily as needed for constipation   Oneal Winchester MD   rizatriptan (MAXALT-MLT) 5 MG ODT Take 1-2 tablets (5-10 mg) by mouth at onset of headache for migraine May repeat in 2 hours. Max 6 tablets/24 hours.  Patient not taking: Reported on 8/25/2021   DaAura Koehler PA-C   sucralfate (CARAFATE) 1 GM tablet Take 1 tablet (1 g) by mouth 4 times daily as needed (heartburn)   Aura Rosario PA-C     Date completed: 09/03/21    Medication  history completed by:   Garry Barton, PharmD, BCPS  [facility]  Emergency Department: Ascom *[id number]

## 2021-09-05 ENCOUNTER — APPOINTMENT (OUTPATIENT)
Dept: GENERAL RADIOLOGY | Facility: CLINIC | Age: 31
End: 2021-09-05
Attending: EMERGENCY MEDICINE
Payer: MEDICARE

## 2021-09-05 ENCOUNTER — HOSPITAL ENCOUNTER (EMERGENCY)
Facility: CLINIC | Age: 31
Discharge: LEFT BEFORE DISCHARGE | End: 2021-09-05
Attending: EMERGENCY MEDICINE | Admitting: EMERGENCY MEDICINE
Payer: MEDICARE

## 2021-09-05 ENCOUNTER — NURSE TRIAGE (OUTPATIENT)
Dept: NURSING | Facility: CLINIC | Age: 31
End: 2021-09-05

## 2021-09-05 ENCOUNTER — APPOINTMENT (OUTPATIENT)
Dept: CT IMAGING | Facility: CLINIC | Age: 31
End: 2021-09-05
Attending: EMERGENCY MEDICINE
Payer: MEDICARE

## 2021-09-05 VITALS
RESPIRATION RATE: 16 BRPM | DIASTOLIC BLOOD PRESSURE: 100 MMHG | TEMPERATURE: 99.9 F | SYSTOLIC BLOOD PRESSURE: 105 MMHG | OXYGEN SATURATION: 92 % | HEART RATE: 99 BPM

## 2021-09-05 DIAGNOSIS — F60.3 BORDERLINE PERSONALITY DISORDER (H): ICD-10-CM

## 2021-09-05 DIAGNOSIS — Z76.5: ICD-10-CM

## 2021-09-05 DIAGNOSIS — Z76.5 DRUG-SEEKING BEHAVIOR: ICD-10-CM

## 2021-09-05 DIAGNOSIS — J18.9 PNEUMONIA OF LEFT LOWER LOBE DUE TO INFECTIOUS ORGANISM: ICD-10-CM

## 2021-09-05 DIAGNOSIS — R11.2 NAUSEA AND VOMITING, INTRACTABILITY OF VOMITING NOT SPECIFIED, UNSPECIFIED VOMITING TYPE: ICD-10-CM

## 2021-09-05 DIAGNOSIS — J18.8 OTHER PNEUMONIA, UNSPECIFIED ORGANISM: ICD-10-CM

## 2021-09-05 DIAGNOSIS — G89.4 CHRONIC PAIN SYNDROME: ICD-10-CM

## 2021-09-05 DIAGNOSIS — F60.3 EXPLOSIVE PERSONALITY DISORDER (H): ICD-10-CM

## 2021-09-05 DIAGNOSIS — Z11.52 ENCOUNTER FOR SCREENING LABORATORY TESTING FOR SEVERE ACUTE RESPIRATORY SYNDROME CORONAVIRUS 2 (SARS-COV-2): ICD-10-CM

## 2021-09-05 LAB
ALBUMIN SERPL-MCNC: 3.4 G/DL (ref 3.4–5)
ALP SERPL-CCNC: 71 U/L (ref 40–150)
ALT SERPL W P-5'-P-CCNC: 39 U/L (ref 0–50)
ANION GAP SERPL CALCULATED.3IONS-SCNC: 4 MMOL/L (ref 3–14)
APTT PPP: 27 SECONDS (ref 22–38)
AST SERPL W P-5'-P-CCNC: 30 U/L (ref 0–45)
BACTERIA BLD CULT: NO GROWTH
BACTERIA BLD CULT: NO GROWTH
BASOPHILS # BLD AUTO: 0 10E3/UL (ref 0–0.2)
BASOPHILS NFR BLD AUTO: 0 %
BILIRUB SERPL-MCNC: 0.2 MG/DL (ref 0.2–1.3)
BUN SERPL-MCNC: 18 MG/DL (ref 7–30)
CALCIUM SERPL-MCNC: 9.8 MG/DL (ref 8.5–10.1)
CHLORIDE BLD-SCNC: 108 MMOL/L (ref 94–109)
CO2 SERPL-SCNC: 29 MMOL/L (ref 20–32)
CREAT SERPL-MCNC: 1.01 MG/DL (ref 0.52–1.04)
EOSINOPHIL # BLD AUTO: 0.1 10E3/UL (ref 0–0.7)
EOSINOPHIL NFR BLD AUTO: 2 %
ERYTHROCYTE [DISTWIDTH] IN BLOOD BY AUTOMATED COUNT: 12.2 % (ref 10–15)
GFR SERPL CREATININE-BSD FRML MDRD: 74 ML/MIN/1.73M2
GLUCOSE BLD-MCNC: 85 MG/DL (ref 70–99)
HCT VFR BLD AUTO: 31.4 % (ref 35–47)
HGB BLD-MCNC: 11 G/DL (ref 11.7–15.7)
IMM GRANULOCYTES # BLD: 0 10E3/UL
IMM GRANULOCYTES NFR BLD: 0 %
INR PPP: 1.09 (ref 0.85–1.15)
LYMPHOCYTES # BLD AUTO: 1.7 10E3/UL (ref 0.8–5.3)
LYMPHOCYTES NFR BLD AUTO: 31 %
MCH RBC QN AUTO: 34 PG (ref 26.5–33)
MCHC RBC AUTO-ENTMCNC: 35 G/DL (ref 31.5–36.5)
MCV RBC AUTO: 97 FL (ref 78–100)
MONOCYTES # BLD AUTO: 0.7 10E3/UL (ref 0–1.3)
MONOCYTES NFR BLD AUTO: 13 %
NEUTROPHILS # BLD AUTO: 2.8 10E3/UL (ref 1.6–8.3)
NEUTROPHILS NFR BLD AUTO: 54 %
NRBC # BLD AUTO: 0 10E3/UL
NRBC BLD AUTO-RTO: 0 /100
PLATELET # BLD AUTO: 227 10E3/UL (ref 150–450)
POTASSIUM BLD-SCNC: 3.8 MMOL/L (ref 3.4–5.3)
PROT SERPL-MCNC: 6.9 G/DL (ref 6.8–8.8)
RBC # BLD AUTO: 3.24 10E6/UL (ref 3.8–5.2)
SARS-COV-2 RNA RESP QL NAA+PROBE: NEGATIVE
SODIUM SERPL-SCNC: 141 MMOL/L (ref 133–144)
WBC # BLD AUTO: 5.3 10E3/UL (ref 4–11)

## 2021-09-05 PROCEDURE — 36415 COLL VENOUS BLD VENIPUNCTURE: CPT | Performed by: EMERGENCY MEDICINE

## 2021-09-05 PROCEDURE — 74176 CT ABD & PELVIS W/O CONTRAST: CPT | Mod: 26 | Performed by: RADIOLOGY

## 2021-09-05 PROCEDURE — 250N000011 HC RX IP 250 OP 636: Performed by: EMERGENCY MEDICINE

## 2021-09-05 PROCEDURE — 80053 COMPREHEN METABOLIC PANEL: CPT | Performed by: EMERGENCY MEDICINE

## 2021-09-05 PROCEDURE — 85610 PROTHROMBIN TIME: CPT | Performed by: EMERGENCY MEDICINE

## 2021-09-05 PROCEDURE — 71045 X-RAY EXAM CHEST 1 VIEW: CPT

## 2021-09-05 PROCEDURE — 85025 COMPLETE CBC W/AUTO DIFF WBC: CPT | Performed by: EMERGENCY MEDICINE

## 2021-09-05 PROCEDURE — G1004 CDSM NDSC: HCPCS | Mod: GC | Performed by: RADIOLOGY

## 2021-09-05 PROCEDURE — 99285 EMERGENCY DEPT VISIT HI MDM: CPT | Mod: 25 | Performed by: EMERGENCY MEDICINE

## 2021-09-05 PROCEDURE — 87040 BLOOD CULTURE FOR BACTERIA: CPT | Performed by: EMERGENCY MEDICINE

## 2021-09-05 PROCEDURE — 71045 X-RAY EXAM CHEST 1 VIEW: CPT | Mod: 26 | Performed by: RADIOLOGY

## 2021-09-05 PROCEDURE — 96376 TX/PRO/DX INJ SAME DRUG ADON: CPT | Performed by: EMERGENCY MEDICINE

## 2021-09-05 PROCEDURE — C9803 HOPD COVID-19 SPEC COLLECT: HCPCS | Performed by: EMERGENCY MEDICINE

## 2021-09-05 PROCEDURE — 999N000127 HC STATISTIC PERIPHERAL IV START W US GUIDANCE

## 2021-09-05 PROCEDURE — 258N000003 HC RX IP 258 OP 636: Performed by: EMERGENCY MEDICINE

## 2021-09-05 PROCEDURE — 96375 TX/PRO/DX INJ NEW DRUG ADDON: CPT | Performed by: EMERGENCY MEDICINE

## 2021-09-05 PROCEDURE — 85730 THROMBOPLASTIN TIME PARTIAL: CPT | Performed by: EMERGENCY MEDICINE

## 2021-09-05 PROCEDURE — 99285 EMERGENCY DEPT VISIT HI MDM: CPT | Performed by: EMERGENCY MEDICINE

## 2021-09-05 PROCEDURE — 96360 HYDRATION IV INFUSION INIT: CPT | Performed by: EMERGENCY MEDICINE

## 2021-09-05 PROCEDURE — U0005 INFEC AGEN DETEC AMPLI PROBE: HCPCS | Performed by: EMERGENCY MEDICINE

## 2021-09-05 PROCEDURE — 74177 CT ABD & PELVIS W/CONTRAST: CPT | Mod: ME

## 2021-09-05 PROCEDURE — 250N000013 HC RX MED GY IP 250 OP 250 PS 637: Performed by: EMERGENCY MEDICINE

## 2021-09-05 PROCEDURE — 250N000009 HC RX 250: Performed by: EMERGENCY MEDICINE

## 2021-09-05 PROCEDURE — 96374 THER/PROPH/DIAG INJ IV PUSH: CPT | Mod: 59 | Performed by: EMERGENCY MEDICINE

## 2021-09-05 PROCEDURE — 96361 HYDRATE IV INFUSION ADD-ON: CPT | Performed by: EMERGENCY MEDICINE

## 2021-09-05 RX ORDER — DIPHENHYDRAMINE HCL 50 MG
50 CAPSULE ORAL ONCE
Status: DISCONTINUED | OUTPATIENT
Start: 2021-09-05 | End: 2021-09-05 | Stop reason: HOSPADM

## 2021-09-05 RX ORDER — GABAPENTIN 300 MG/1
300 CAPSULE ORAL 3 TIMES DAILY
Status: DISCONTINUED | OUTPATIENT
Start: 2021-09-05 | End: 2021-09-05 | Stop reason: HOSPADM

## 2021-09-05 RX ORDER — CYCLOBENZAPRINE HCL 5 MG
10 TABLET ORAL 3 TIMES DAILY PRN
Status: DISCONTINUED | OUTPATIENT
Start: 2021-09-05 | End: 2021-09-05 | Stop reason: HOSPADM

## 2021-09-05 RX ORDER — BUPROPION HYDROCHLORIDE 150 MG/1
300 TABLET ORAL EVERY MORNING
Status: DISCONTINUED | OUTPATIENT
Start: 2021-09-05 | End: 2021-09-05 | Stop reason: HOSPADM

## 2021-09-05 RX ORDER — CEFTRIAXONE 1 G/1
1 INJECTION, POWDER, FOR SOLUTION INTRAMUSCULAR; INTRAVENOUS ONCE
Status: DISCONTINUED | OUTPATIENT
Start: 2021-09-05 | End: 2021-09-05 | Stop reason: HOSPADM

## 2021-09-05 RX ORDER — IOPAMIDOL 755 MG/ML
114 INJECTION, SOLUTION INTRAVASCULAR ONCE
Status: COMPLETED | OUTPATIENT
Start: 2021-09-05 | End: 2021-09-05

## 2021-09-05 RX ORDER — AZITHROMYCIN 250 MG/1
TABLET, FILM COATED ORAL
Qty: 6 TABLET | Refills: 0 | Status: SHIPPED | OUTPATIENT
Start: 2021-09-05 | End: 2021-09-10

## 2021-09-05 RX ORDER — DULOXETIN HYDROCHLORIDE 60 MG/1
60 CAPSULE, DELAYED RELEASE ORAL DAILY
Status: DISCONTINUED | OUTPATIENT
Start: 2021-09-05 | End: 2021-09-05 | Stop reason: HOSPADM

## 2021-09-05 RX ORDER — HYDROMORPHONE HYDROCHLORIDE 1 MG/ML
0.5 INJECTION, SOLUTION INTRAMUSCULAR; INTRAVENOUS; SUBCUTANEOUS ONCE
Status: COMPLETED | OUTPATIENT
Start: 2021-09-05 | End: 2021-09-05

## 2021-09-05 RX ORDER — ONDANSETRON 2 MG/ML
4 INJECTION INTRAMUSCULAR; INTRAVENOUS EVERY 6 HOURS PRN
Status: DISCONTINUED | OUTPATIENT
Start: 2021-09-05 | End: 2021-09-05 | Stop reason: HOSPADM

## 2021-09-05 RX ORDER — DICYCLOMINE HCL 20 MG
20 TABLET ORAL EVERY 6 HOURS
Status: DISCONTINUED | OUTPATIENT
Start: 2021-09-05 | End: 2021-09-05 | Stop reason: HOSPADM

## 2021-09-05 RX ORDER — ALBUTEROL SULFATE 90 UG/1
2 AEROSOL, METERED RESPIRATORY (INHALATION) EVERY 6 HOURS PRN
Status: DISCONTINUED | OUTPATIENT
Start: 2021-09-05 | End: 2021-09-05 | Stop reason: HOSPADM

## 2021-09-05 RX ORDER — ONDANSETRON 2 MG/ML
4 INJECTION INTRAMUSCULAR; INTRAVENOUS ONCE
Status: COMPLETED | OUTPATIENT
Start: 2021-09-05 | End: 2021-09-05

## 2021-09-05 RX ORDER — PANTOPRAZOLE SODIUM 40 MG/1
40 TABLET, DELAYED RELEASE ORAL 2 TIMES DAILY
Status: DISCONTINUED | OUTPATIENT
Start: 2021-09-05 | End: 2021-09-05 | Stop reason: HOSPADM

## 2021-09-05 RX ORDER — SODIUM CHLORIDE 9 MG/ML
INJECTION, SOLUTION INTRAVENOUS CONTINUOUS
Status: DISCONTINUED | OUTPATIENT
Start: 2021-09-05 | End: 2021-09-05 | Stop reason: HOSPADM

## 2021-09-05 RX ORDER — ONDANSETRON 4 MG/1
4 TABLET, ORALLY DISINTEGRATING ORAL EVERY 6 HOURS PRN
Status: DISCONTINUED | OUTPATIENT
Start: 2021-09-05 | End: 2021-09-05 | Stop reason: HOSPADM

## 2021-09-05 RX ORDER — CALCIUM CARBONATE 500 MG/1
1000 TABLET, CHEWABLE ORAL 4 TIMES DAILY PRN
Status: DISCONTINUED | OUTPATIENT
Start: 2021-09-05 | End: 2021-09-05 | Stop reason: HOSPADM

## 2021-09-05 RX ORDER — METOPROLOL SUCCINATE 50 MG/1
50 TABLET, EXTENDED RELEASE ORAL DAILY
Status: DISCONTINUED | OUTPATIENT
Start: 2021-09-05 | End: 2021-09-05 | Stop reason: HOSPADM

## 2021-09-05 RX ORDER — BUSPIRONE HYDROCHLORIDE 15 MG/1
15 TABLET ORAL 3 TIMES DAILY
Status: DISCONTINUED | OUTPATIENT
Start: 2021-09-05 | End: 2021-09-05 | Stop reason: HOSPADM

## 2021-09-05 RX ORDER — ARIPIPRAZOLE 30 MG/1
30 TABLET ORAL AT BEDTIME
Status: DISCONTINUED | OUTPATIENT
Start: 2021-09-05 | End: 2021-09-05 | Stop reason: HOSPADM

## 2021-09-05 RX ORDER — LIDOCAINE 40 MG/G
CREAM TOPICAL
Status: DISCONTINUED | OUTPATIENT
Start: 2021-09-05 | End: 2021-09-05 | Stop reason: HOSPADM

## 2021-09-05 RX ORDER — DIPHENHYDRAMINE HCL 50 MG
50 CAPSULE ORAL ONCE
Status: COMPLETED | OUTPATIENT
Start: 2021-09-05 | End: 2021-09-05

## 2021-09-05 RX ORDER — MESALAMINE 500 MG/1
1000 CAPSULE, EXTENDED RELEASE ORAL 3 TIMES DAILY
Status: DISCONTINUED | OUTPATIENT
Start: 2021-09-05 | End: 2021-09-05 | Stop reason: HOSPADM

## 2021-09-05 RX ORDER — CLONAZEPAM 0.5 MG/1
1 TABLET ORAL 2 TIMES DAILY PRN
Status: DISCONTINUED | OUTPATIENT
Start: 2021-09-05 | End: 2021-09-05 | Stop reason: HOSPADM

## 2021-09-05 RX ORDER — SUCRALFATE 1 G/1
1 TABLET ORAL 4 TIMES DAILY PRN
Status: DISCONTINUED | OUTPATIENT
Start: 2021-09-05 | End: 2021-09-05 | Stop reason: HOSPADM

## 2021-09-05 RX ADMIN — HYDROMORPHONE HYDROCHLORIDE 0.5 MG: 1 INJECTION, SOLUTION INTRAMUSCULAR; INTRAVENOUS; SUBCUTANEOUS at 07:31

## 2021-09-05 RX ADMIN — ONDANSETRON 4 MG: 2 INJECTION INTRAMUSCULAR; INTRAVENOUS at 06:08

## 2021-09-05 RX ADMIN — ONDANSETRON 4 MG: 2 INJECTION INTRAMUSCULAR; INTRAVENOUS at 07:30

## 2021-09-05 RX ADMIN — SODIUM CHLORIDE, POTASSIUM CHLORIDE, SODIUM LACTATE AND CALCIUM CHLORIDE 1000 ML: 600; 310; 30; 20 INJECTION, SOLUTION INTRAVENOUS at 07:31

## 2021-09-05 RX ADMIN — LIDOCAINE HYDROCHLORIDE 30 ML: 20 SOLUTION ORAL; TOPICAL at 06:21

## 2021-09-05 RX ADMIN — DIPHENHYDRAMINE HYDROCHLORIDE 50 MG: 50 CAPSULE ORAL at 06:21

## 2021-09-05 RX ADMIN — HYDROMORPHONE HYDROCHLORIDE 0.5 MG: 1 INJECTION, SOLUTION INTRAMUSCULAR; INTRAVENOUS; SUBCUTANEOUS at 06:20

## 2021-09-05 RX ADMIN — SODIUM CHLORIDE 1000 ML: 9 INJECTION, SOLUTION INTRAVENOUS at 06:08

## 2021-09-05 RX ADMIN — IOPAMIDOL 114 ML: 755 INJECTION, SOLUTION INTRAVENOUS at 09:41

## 2021-09-05 NOTE — ED NOTES
Pt became very somulent after PRN diluadid 0.5mg was given, pt requesting benadryl for itchiness , writer stated to patient that she was worried about her not being able to keep her eyes open long enough to have a conversation. Writer explained that she would like to wait until she is more awake to possibly give benadryl. Pt agreed with plan. Patient continues to fall asleep while  and writer is trying to communicate.

## 2021-09-05 NOTE — ED PROVIDER NOTES
ED Provider Note  [facility]      History     Chief Complaint   Patient presents with     Hematemesis     The history is provided by the patient. The history is limited by a language barrier. A  was used (in person ).     Katy Islas is a 31 year old female,  hearing impaired with PMH significant for chronic back pain (admitted to [facility] 8/24-8/29), GERD, depression, bipolar disorder, borderline personality disorder, drug-seeking behavior, Crohn's disease, and recently admitted to [facility] for generalized myalgias, malaise, low-grade temperature, and GLADYS/acute renal failure (8/30-9/1) who presents to the Emergency Department for continued vomiting, fever and all over body aches.  She states that she has significant pain in her upper abdomen.  The patient states she was admitted because her hemoglobin and blood cell counts were low.  She was recently discharged less than a day ago and continues to experience nausea.  She has vomited 5 times overnight.  She states she is unable to keep down food or fluids.  She states she has had a fever to 103 yesterday.  She notes she has bruising on her left leg of unknown etiology.  She denies falls.  She stresses that she has a lot of pain in her legs and requests Benadryl as well as pain medications.    Past Medical History  Past Medical History:   Diagnosis Date     Abdominal pain 10/31- 11/4/2005    [facility]     Allergic rhinitis, cause unspecified     Allergic rhinitis     Arthritis      C. difficile diarrhea     Past, no current diarrhea.     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, without mention of intractable epilepsy     pseudoseizures diagnosed after extensive neurologic eval     Migraine 07/21/12    D/C 07/22/12-[facility]     Migraine, unspecified, without mention of intractable migraine without mention of status migrainosus     Migraine     Mild intermittent asthma     mild intermittent     Mycoplasma infection in conditions classified elsewhere and of unspecified site      Other chronic pain     Back pain for 6 years     Renal disease     Kidney stones     Unspecified hearing loss     congenital hearing loss     Past Surgical History:   Procedure Laterality Date     AS REMOVAL OF COCCYX  10/18/2017     C FUSION OF SACROILIAC JOINT  10/26/2018     COLONOSCOPY  7/1/2009    [facility].     COLONOSCOPY N/A 7/17/2019    Procedure: Colonoscopy, With Polypectomy And Biopsy;  Surgeon: Edwin Conde MD;  Location: [facility] OR     COLONOSCOPY WITH CO2 INSUFFLATION N/A 7/17/2019    Procedure: COLONOSCOPY, WITH CO2 INSUFFLATION;  Surgeon: Edwin Conde MD;  Location: [facility] OR     COMBINED ESOPHAGOSCOPY, GASTROSCOPY, DUODENOSCOPY (EGD) WITH CO2 INSUFFLATION N/A 4/12/2019    Procedure: COMBINED ESOPHAGOSCOPY, GASTROSCOPY, DUODENOSCOPY (EGD) WITH CO2 INSUFFLATION;  Surgeon: Edwin Conde MD;  Location: [facility] OR     ESOPHAGOSCOPY, GASTROSCOPY, DUODENOSCOPY (EGD), COMBINED N/A 4/12/2019    Procedure: Combined Esophagoscopy, Gastroscopy, Duodenoscopy (Egd), Biopsy Single Or Multiple;  Surgeon: Edwin Conde MD;  Location: [facility] OR     FISTULOTOMY RECTUM N/A 4/30/2020    Procedure: EXAM UNDER ANESTHESIA, ANUS, parital fistulotomy;  Surgeon: Valentin Sanchez MD;  Location: [facility] OR     FUSION SPINE POSTERIOR MINIMALLY INVASIVE ONE LEVEL N/A  2/23/2017    Procedure: FUSION SPINE POSTERIOR MINIMALLY INVASIVE ONE LEVEL;  L4-5 Oblique Lateral Lumbar Interbody Fusion   Epidural steroid injection.   Transpedicular Bone marrow aspiration;  Surgeon: Jeniffer Eugene MD;  Location: [facility] OR     HC EEG AWAKE AND SLEEP      abnormal     HC MRI BRAIN W/O CONTRAST  12/00    normal     HC REMOVAL GALLBLADDER  8/5/2009    [facility], [facility].     HC UGI ENDOSCOPY DIAG W BIOPSY  11/11/09    Normal esophagus     ORTHOPEDIC SURGERY  October 19,2011    diskectomy L4-L5     [facility]  [facility]/2003    Impression is that of normal appearing colonoscopy, without evidence of rectal bleeding.     [facility] COLONOSCOPY THRU STOMA, DIAGNOSTIC  10/00    normal     [facility]  Oct 2009    Dr López- normal     [facility] UGI ENDOSCOPY, SIMPLE EXAM  7/00, 10/00    mild chronic esophagitis and duodenitis, neg H pylori     [facility] UGI ENDOSCOPY, SIMPLE EXAM  01/20/2005    Esophagogastroduodenoscopy, colonoscopy with biopsies.  [facility] UGI ENDOSCOPY, SIMPLE EXAM  7/1/2009    [facility], [facility].     [facility] UGI ENDOSCOPY, SIMPLE EXAM  11/11/2009    attempted upper GI, pt. could not tolerate procedure:[facility]     azithromycin (ZITHROMAX Z-ILAN) 250 MG tablet  albuterol (PROAIR HFA/PROVENTIL HFA/VENTOLIN HFA) 108 (90 Base) MCG/ACT inhaler  ARIPiprazole (ABILIFY) 30 MG tablet  azelastine (OPTIVAR) 0.05 % ophthalmic solution  buPROPion (WELLBUTRIN XL) 300 MG 24 hr tablet  busPIRone (BUSPAR) 15 MG tablet  clonazePAM (KLONOPIN) 1 MG tablet  cyclobenzaprine (FLEXERIL) 10 MG tablet  dicyclomine (BENTYL) 20 MG tablet  DULoxetine (CYMBALTA) 30 MG capsule  EPINEPHrine (ANY BX GENERIC EQUIV) 0.3 MG/0.3ML injection 2-pack  furosemide (LASIX) 20 MG tablet  gabapentin (NEURONTIN) 300 MG capsule  HYDROmorphone (DILAUDID) 2 MG tablet  lidocaine (XYLOCAINE) 2 % solution  medroxyPROGESTERone  (DEPO-PROVERA) 150 MG/ML IM injection  mesalamine ER (PENTASA) 500 MG CR capsule  metoprolol succinate ER (TOPROL-XL) 50 MG 24 hr tablet  ondansetron (ZOFRAN-ODT) 4 MG ODT tab  pantoprazole (PROTONIX) 40 MG EC tablet  polyethylene glycol (MIRALAX) 17 g packet  rizatriptan (MAXALT-MLT) 5 MG ODT  sucralfate (CARAFATE) 1 GM tablet      Allergies   Allergen Reactions     Iohexol Hives     Other reaction(s): Hives  IV contrast; patient states she tolerates contrast if she gets benadryl before  IV contrast; patient states she tolerates contrast if she gets benadryl before     Other Environmental Allergy Rash     Plastic tape     Baclofen      hives     Bees Hives, Swelling and Difficulty breathing     Contrast Dye      Hives,   Updated 5/10/2016 CT Contrast.     Iodine Hives     Metoclopramide      Other reaction(s): Tremors  LW Reaction: shaking/sweating     Midazolam      Other reaction(s): Agitation     Monosodium Glutamate      Morphine Other (See Comments)     Difficulty with urination     Nsaids Other (See Comments)     GI BLEED x2     Other (Do Not Use) Other (See Comments)     Xanaflex- pt becomes disoriented and loses bladder control     Reglan [Metoclopramide Hcl] Other (See Comments)     shaking     Soma [Carisoprodol] Visual Disturbance     Sleep walking     Tizanidine      Topamax Other (See Comments)     Topamax [Topiramate] Nausea     Tingling  GI/Vomit     Tramadol      Severe Headache, Seizure Risk     Tylenol W/Codeine [Acetaminophen-Codeine] Nausea and Itching     Tylenol 3     Versed Other (See Comments)     Zolmitriptan      Makes face feel like its twitching     Droperidol Anxiety     Flu Virus Vaccine Rash      Arm swelling     Family History  Family History   Problem Relation Age of Onset     Gastrointestinal Disease Brother         severe Crohn's     Neurologic Disorder Brother         Seizures post head injury     Depression Brother      Substance Abuse Brother      Genitourinary Problems Father          kidney stones     Diabetes Father      Heart Disease Father         Open heart surgery     Breast Cancer Maternal Grandmother      Parkinsonism Maternal Grandmother      Cerebrovascular Disease Paternal Grandmother      Cancer Maternal Grandfather         Lung     Cardiovascular Paternal Grandfather         Heart Attack     Substance Abuse Mother         in recovery x1 year      Lung Cancer Paternal Uncle      Cancer Paternal Uncle      Lung Cancer Maternal Uncle      Colon Cancer Maternal Uncle      Social History   Social History     Tobacco Use     Smoking status: Current Every Day Smoker     Packs/day: 0.25     Years: 5.00     Pack years: 1.25     Types: Cigarettes     Smokeless tobacco: Never Used   Vaping Use     Vaping Use: Some days     Substances: CBD     Devices: Disposable, Pre-filled or refillable cartridge   Substance Use Topics     Alcohol use: Not Currently     Comment: Not since last July 2018     Drug use: Not Currently     Types: Marijuana     Comment: Medical Marijuana currently-- More CBD      Past medical history, past surgical history, medications, allergies, family history, and social history were reviewed with the patient. No additional pertinent items.       Review of Systems  A complete review of systems was performed with pertinent positives and negatives noted in the HPI, and all other systems negative.    Physical Exam   BP: (!) 135/100  Pulse: 116  Temp: 99.9  F (37.7  C)  Resp: 16  SpO2: 97 %  Physical Exam  Physical Exam   Constitutional:   well nourished, well developed, diaphoretic  HENT:   Head: Normocephalic and atraumatic.   Eyes: Conjunctivae are normal. Pupils are equal, round, and reactive to light.    pharynx has no erythema or exudate, mucous membranes are dry  Neck:   no adenopathy, no bony tenderness  Cardiovascular: regular rate and rhythm without murmurs or gallops  Pulmonary/Chest: Clear to auscultation bilaterally, with no wheezes or retractions. No respiratory  distress.  GI: Soft with good bowel sounds.  Midepigastric tenderness. non-distended, with no guarding, no rebound, no peritoneal signs.   Back:  No bony or CVA tenderness   Musculoskeletal: multiple bruises to LLE see diagram  Skin: Skin is warm and diaphoretic. No rash noted.   Neurological: alert and oriented to person, place, and time. Nonfocal exam  Psychiatric: flat mood and affect.     Bruises to LLE        ED Course      Procedures       The medical record was reviewed and interpreted.  Current labs reviewed and interpreted.  Previous labs reviewed and interpreted.  Current images reviewed and interpreted: see below.  Previous images reviewed and interpreted: see below.       Results for orders placed or performed during the hospital encounter of 09/05/21   XR Chest Port 1 View     Status: None    Narrative    EXAMINATION: XR CHEST PORT 1 VIEW, 9/5/2021 8:25 AM    INDICATION: fever    COMPARISON: Chest radiograph 8/30/2021    FINDINGS: AP and lateral upright views of the chest.     Trachea is midline. Cardiac and mediastinal borders are clear. Cardiac  silhouette is stable. Left hemidiaphragm elevation with linear basilar  opacity, presumed atelectasis. No pleural effusion. No pneumothorax.  No acute osseous abnormality. Spinal stimulator device noted.      Impression    IMPRESSION: Left hemidiaphragm elevation and linear basilar opacity,  presumed atelectasis.    I have personally reviewed the examination and initial interpretation  and I agree with the findings.    NOELLE FISHER MD         SYSTEM ID:  [id number]   CT Abdomen Pelvis w Contrast     Status: None    Narrative    EXAMINATION: CT abdomen/pelvis with contrast, 9/5/2021.    INDICATION: Abdominal pain, fever.    COMPARISON: CT AP 8/30/2021    TECHNIQUE: Routine images from the level of the diaphragm through the  pelvis were obtained with contrast.    FINDINGS:    The liver parenchyma is homogenous. No focal hepatic lesion.  Postsurgical changes of  cholecystectomy. No intrahepatic or  extrahepatic biliary duct dilation. The spleen is grossly  unremarkable. Mild fatty infiltration of the pancreas. Bilateral  adrenal glands are unremarkable.    Bilaterally symmetrically enhancing kidneys. Several low-density right  greater than left presumed renal cysts. Tiny nonobstructing bilateral  calyceal tip stones. No renal mass. No hydronephrosis. No hydroureter.  The bladder is unremarkable. Reproductive organs are within normal  limits. No pelvic masses.    The large and small bowel is normal in caliber. No abnormal bowel wall  thickening or enhancement. Mild colonic stool burden. Stomach is  within normal limits.    The abdominal aorta and major abdominal vessels are patent without  evidence of aneurysmal dilation clinically significant stenosis. The  main portal vein is patent. No intra-abdominal lymphadenopathy by size  criteria.    Postsurgical changes of sacral and lumbar spinal fusion. Spinal  stimulator device noted. No acute osseous lesion. No acute fracture.    Lung bases: Left basilar opacity, progressed from 8/30/2021 CT. No  pleural effusion. Mild right basilar atelectasis.      Impression    IMPRESSION:    1. Left basilar opacity, progressed from 8/30/2021 CT. Concerning for  pneumonia.  2. Tiny nonobstructing calyceal tip stones of bilateral kidneys.  3. No other acute finding in the abdomen or pelvis.    I have personally reviewed the examination and initial interpretation  and I agree with the findings.    NOELLE FISHER MD         SYSTEM ID:  [id number]   CBC with platelets differential     Status: Abnormal    Narrative    The following orders were created for panel order CBC with platelets differential.  Procedure                               Abnormality         Status                     ---------                               -----------         ------                     CBC with platelets and d...[[id number]]  Abnormal            Final result                  Please view results for these tests on the individual orders.   Comprehensive metabolic panel     Status: Normal   Result Value Ref Range    Sodium 141 133 - 144 mmol/L    Potassium 3.8 3.4 - 5.3 mmol/L    Chloride 108 94 - 109 mmol/L    Carbon Dioxide (CO2) 29 20 - 32 mmol/L    Anion Gap 4 3 - 14 mmol/L    Urea Nitrogen 18 7 - 30 mg/dL    Creatinine 1.01 0.52 - 1.04 mg/dL    Calcium 9.8 8.5 - 10.1 mg/dL    Glucose 85 70 - 99 mg/dL    Alkaline Phosphatase 71 40 - 150 U/L    AST 30 0 - 45 U/L    ALT 39 0 - 50 U/L    Protein Total 6.9 6.8 - 8.8 g/dL    Albumin 3.4 3.4 - 5.0 g/dL    Bilirubin Total 0.2 0.2 - 1.3 mg/dL    GFR Estimate 74 >60 mL/min/1.73m2   INR     Status: Normal   Result Value Ref Range    INR 1.09 0.85 - 1.15   Partial thromboplastin time     Status: Normal   Result Value Ref Range    aPTT 27 22 - 38 Seconds   CBC with platelets and differential     Status: Abnormal   Result Value Ref Range    WBC Count 5.3 4.0 - 11.0 10e3/uL    RBC Count 3.24 (L) 3.80 - 5.20 10e6/uL    Hemoglobin 11.0 (L) 11.7 - 15.7 g/dL    Hematocrit 31.4 (L) 35.0 - 47.0 %    MCV 97 78 - 100 fL    MCH 34.0 (H) 26.5 - 33.0 pg    MCHC 35.0 31.5 - 36.5 g/dL    RDW 12.2 10.0 - 15.0 %    Platelet Count 227 150 - 450 10e3/uL    % Neutrophils 54 %    % Lymphocytes 31 %    % Monocytes 13 %    % Eosinophils 2 %    % Basophils 0 %    % Immature Granulocytes 0 %    NRBCs per 100 WBC 0 <1 /100    Absolute Neutrophils 2.8 1.6 - 8.3 10e3/uL    Absolute Lymphocytes 1.7 0.8 - 5.3 10e3/uL    Absolute Monocytes 0.7 0.0 - 1.3 10e3/uL    Absolute Eosinophils 0.1 0.0 - 0.7 10e3/uL    Absolute Basophils 0.0 0.0 - 0.2 10e3/uL    Absolute Immature Granulocytes 0.0 <=0.0 10e3/uL    Absolute NRBCs 0.0 10e3/uL   Asymptomatic COVID-19 Virus (Coronavirus) by PCR Nasopharyngeal     Status: Normal    Specimen: Nasopharyngeal; Swab   Result Value Ref Range    SARS CoV2 PCR Negative Negative    Narrative    Testing was performed using the Xpert Xpress  SARS-CoV-2 Assay on the  [employer]. Additional information about  this Emergency Use Authorization (EUA) assay can be found via the Lab  Guide. This test should be ordered for the detection of SARS-CoV-2 in  individuals who meet SARS-CoV-2 clinical and/or epidemiological  criteria. Test performance is unknown in asymptomatic patients. This  test is for in vitro diagnostic use under the FDA EUA for  laboratories certified under CLIA to perform high complexity testing.  This test has not been FDA cleared or approved. A negative result  does not rule out the presence of PCR inhibitors in the specimen or  target RNA in concentration below the limit of detection for the  assay. The possibility of a false negative should be considered if  the patient's recent exposure or clinical presentation suggests  COVID-19. This test was validated by the [facility]. This laboratory is certified under  the Clinical Laboratory Improvement Amendments of 1988 (CLIA-88) as  qualified to perform high complexity laboratory testing.       Medications   0.9% sodium chloride BOLUS (0 mLs Intravenous Stopped 9/5/21 0848)     Followed by   sodium chloride 0.9% infusion (0 mLs Intravenous Hold 9/5/21 0730)   diphenhydrAMINE (BENADRYL) capsule 50 mg (0 mg Oral Hold 9/5/21 0847)   cefTRIAXone (ROCEPHIN) 1 g vial to attach to  mL bag for ADULTS or NS 50 mL bag for PEDS (has no administration in time range)   azithromycin (ZITHROMAX) 500 mg in sodium chloride 0.9 % 250 mL intermittent infusion (has no administration in time range)   lidocaine (XYLOCAINE) 2 % 15 mL, alum & mag hydroxide-simethicone (MAALOX) 15 mL GI Cocktail (30 mLs Oral Given 9/5/21 0621)   ondansetron (ZOFRAN) injection 4 mg (4 mg Intravenous Given 9/5/21 0608)   HYDROmorphone (PF) (DILAUDID) injection 0.5 mg (0.5 mg Intravenous Given 9/5/21 0620)   diphenhydrAMINE (BENADRYL) capsule 50 mg (50 mg  Oral Given 9/5/21 0621)   lactated ringers BOLUS 1,000 mL (0 mLs Intravenous Stopped 9/5/21 1054)   HYDROmorphone (PF) (DILAUDID) injection 0.5 mg (0.5 mg Intravenous Given 9/5/21 0731)   ondansetron (ZOFRAN) injection 4 mg (4 mg Intravenous Given 9/5/21 0730)   iopamidol (ISOVUE-370) solution 114 mL (114 mLs Intravenous Given 9/5/21 0941)   sodium chloride (PF) 0.9% PF flush 78 mL (78 mLs Intravenous Given 9/5/21 0941)        Assessments & Plan (with Medical Decision Making)       I have reviewed the nursing notes.   Emergency Department course:  The patient was seen and examined at 0710 am, shortly after my arrival to my shift.  She had been in the ED about 3 and 1/2 hours but the night physician was overwhelmed with patients and no  was available.   Many laboratory studies had already resulted.  Comprehensive metabolic panel is unremarkable.  INR is 1.09.  CBC shows mild anemia, with a hemoglobin of 11.  WBC is normal at 5.3.    From chart review and review of Care Everywhere, patient has had multiple ED visits and hospitalizations in the past several months for various symptoms. Of note, patient was hospitalized from 30-May-2021 to 31-May-2021 at [facility] with pneumonia and was discharged to home. Patient was also diagnosed with pneumonia on 07-Aug-2021 at [facility]'s ED. Patient was also admitted to [facility] for pneumonia on 09-Aug-2021 but then let against medical advice; patient then presented to [facility]'s ED on 09-Aug-2021 but apparently left against medical advice when she was not given dilaudid. From review of ED notes, patient had presented to EDs and had been discharged on a number of occasions but patient also absconded from EDs on 12-Jun-2021, 13-Jun-2021 and 29-Aug-2021. Patient had just been discharged from [facility] on 29-Aug-2021. Patient also apparently had asked for various opioid prescriptions e.g. robitussin with  codeine and vicodin on some ED discharges as well.    She was also admitted on September 3, 2 days ago, and was discharged yesterday.  Discharge note is not yet complete.    I ordered on blood cultures x2 and a chest x-ray.    The patient had already received Benadryl p.o., Dilaudid IV, Zofran IV, and a normal saline bolus IV prior to my arrival in the emergency department..  She also received a GI cocktail.  I treated with a lactated Ringer bolus, additional Zofran IV, and Dilaudid IV.  Portable chest x-ray shows:  IMPRESSION: Left hemidiaphragm elevation and linear basilar opacity,  presumed atelectasis.    Given the patient's history of vomiting, I obtained an abdominal and pelvic CT scan with IV contrast.  The patient was pretreated with Benadryl for an allergy to the contrast dye.   (her chart notes that she can tolerate IV contrast if she is pretreated with Benadryl).    CT of the abdomen and pelvis shows 1. Left basilar opacity, progressed from 8/30/2021 CT. Concerning for  pneumonia.  2. Tiny nonobstructing calyceal tip stones of bilateral kidneys.  3. No other acute finding in the abdomen or pelvis.    I treated her with ceftriaxone IV and Zithromax IV for presumed community-acquired pneumonia.  A developing pneumonia may explain her fevers and her myalgias.    The patient is a 31-year-old female who presents with vomiting, fevers, myalgias and worsening symptoms.  She was just discharged yesterday from the hospital and states she has been vomiting since then.  CT suggest she is developing a pneumonia.  She has received multiple medications here in the emergency department.  She will be admitted to the medicine service for further evaluation and treatment.  I spoke with Dr. Rivera of Medicine regarding admission.      Addendum: I spoke to the patient about her chronic pain syndrome flor bout 1100 am.  She has repeatedly requested Dilaudid and Benadryl while she was in the emergency department.  She was  "diaphoretic when I initially evaluated her.  After  work-up and chart review, I wonder if her diaphoresis was secondary to some aspect of opiate withdrawal.   I believe the patient has an addiction problem.  I had intended to request a Pain Consult while the patient was inpatient as well as her having her see the addiction specialist.  The patient became exceedingly irate with this discussion.  She states she has been on Suboxone in the past.  She refuses to go back on it.  She states it causes decay of teeth and costs $300 a month.  She then requested a fentanyl pain patch.  I told her that fentanyl is also a narcotic.  The patient then became markedly upset and states \" I have real pain.  No one can know my pain unless they are in my body.  My legs hurt so much I cannot walk.\"  She states that I have hurt her feelings because I have questioned her pain.  I tried to explain that I was not questioning the fact that she has pain but I feel that opiates do not work on pain over the long-term.  She has had multiple recent admissions and has been diagnosed with borderline personality disorder and drug-seeking behavior.  She has left the hospital AMA multiple times.  The patient then refused to be admitted.  She states she does not believe she has pneumonia and she does not believe the CT findings.  She refuses IV antibiotics and refuses admission.  She actually became quite belligerent and aggressive.  The patient refused to sign an AMA form and threw it across the room.Security was called at this time  She was ambulating without difficulty in the emergency department.  I was trying to discuss this further with the patient when she turned her back on the .  I did give her the clinic numbers to the [facility] on discharge as well as to the [facility] on the [facility] in case she wants to pursue opiate addiction treatment.  I prescribed Zithromax for pneumonia.  She will need to " follow-up with her regular physician for further evaluation    The patient left AGAINST MEDICAL ADVICE.         I have reviewed the findings, diagnosis, plan and need for follow up with the patient.    New Prescriptions    AZITHROMYCIN (ZITHROMAX Z-ILAN) 250 MG TABLET    Two tablets on the first day, then one tablet daily for the next 4 days       Final diagnoses:   Pneumonia of left lower lobe due to infectious organism   Nausea and vomiting, intractability of vomiting not specified, unspecified vomiting type   Chronic pain syndrome   Borderline personality disorder (H)   Drug-seeking behavior   AMA    --  Natalia FOURNIER [facility] EMERGENCY DEPARTMENT  9/5/2021     Natalia Alexander MD  09/05/21 1141

## 2021-09-05 NOTE — ED TRIAGE NOTES
Pt reports she was just discharged from the hospital yesterday, is still having N/V and reports blood in her vomit and today a fever of 103 at home, took tylenol. Bruising noted to BLE

## 2021-09-05 NOTE — TELEPHONE ENCOUNTER
Triage note:     for patient called to translate during this call. Patient took tylenol an hour ago. Last temperature reading was  102.5 degrees. Patient states she is experiencing  vomiting and headache (month). Hard to tell if vomited blood, currently experiencing throat pain and unable to eat or drink anything. Patient was last triaged a few hours ago    Per protocol patient to go to ED now. Patient states she can not go due to finding a . Given care advise per protocol. Patient verbalized understanding and agrees to plan of care.    Della Tellez RN   09/05/21 1:31 AM  [facility] Nurse Advisor      COVID 19 Nurse Triage Plan/Patient Instructions    Please be aware that novel coronavirus (COVID-19) may be circulating in the community. If you develop symptoms such as fever, cough, or SOB or if you have concerns about the presence of another infection including coronavirus (COVID-19), please contact your health care provider or visit [email or web address].     Disposition/Instructions    ED Visit recommended. Follow protocol based instructions.     Bring Your Own Device:  Please also bring your smart device(s) (smart phones, tablets, laptops) and their charging cables for your personal use and to communicate with your care team during your visit.    Thank you for taking steps to prevent the spread of this virus.  o Limit your contact with others.  o Wear a simple mask to cover your cough.  o Wash your hands well and often.    Resources    [facility]: About COVID-19: [email or web address]    CDC: What to Do If You're Sick: www.cdc.gov/coronavirus/2019-ncov/about/steps-when-sick.html    CDC: Ending Home Isolation: www.cdc.gov/coronavirus/2019-ncov/hcp/disposition-in-home-patients.html     CDC: Caring for Someone: www.cdc.gov/coronavirus/2019-ncov/if-you-are-sick/care-for-someone.html     [facility]: Interim Guidance for Hospital Discharge to Home:  "www.health.[facility].[place]/diseases/coronavirus/hcp/hospdischarge.pdf    [facility] clinical trials (COVID-19 research studies): [email or web address]     Below are the COVID-19 hotlines at the [place] Department of [facility] ([facility]). Interpreters are available.   o For health questions: Call 327-650-0878 or 1-894.165.5718 (7 a.m. to 7 p.m.)  o For questions about schools and childcare: Call 913-083-6724 or 1-845.427.3840 (7 a.m. to 7 p.m.)                     Reason for Disposition    Patient sounds very sick or weak to the triager    Additional Information    Negative: Shock suspected (e.g., cold/pale/clammy skin, too weak to stand, low BP, rapid pulse)    Negative: Difficult to awaken or acting confused (e.g., disoriented, slurred speech)    Negative: Sounds like a life-threatening emergency to the triager    Negative: Vomiting occurs only while coughing    Negative: [1] Pregnant < 20 Weeks AND [2] nausea/vomiting began in early pregnancy (i.e., 4-8 weeks pregnant)    Negative: Chest pain    Negative: Headache is main symptom    Negative: Vomiting (or Nausea) in a cancer patient who is currently (or recently) receiving chemotherapy or radiation therapy, or cancer patient who has metastatic or end-stage cancer and is receiving palliative care    Negative: [1] Vomiting AND [2] contains red blood or black (\"coffee ground\") material  (Exception: few red streaks in vomit that only happened once)    Negative: Severe pain in one eye    Negative: Recent head injury (within last 3 days)    Negative: Recent abdominal injury (within last 3 days)    Negative: [1] Insulin-dependent diabetes (Type I) AND [2] glucose > 400 mg/dl (22 mmol/l)    Negative: [1] SEVERE vomiting (e.g., 6 or more times/day) AND [2] present > 8 hours    Negative: [1] MODERATE vomiting (e.g., 3 - 5 times/day) AND [2] age > 60    Negative: Severe headache (e.g., excruciating) (Exception: similar to previous migraines)    " Negative: [1] Drinking very little AND [2] dehydration suspected (e.g., no urine > 12 hours, very dry mouth, very lightheaded)    Protocols used: VOMITING-A-AH

## 2021-09-05 NOTE — ED NOTES
"Through the  MD spoke with patient regarding CT results as well as pain medication usage, see MD notes. Pt became very upset and started saying through the , \"You don't even see me, you are just calling me an addict,\" Pt then requested to leave AMA and refused to sign paper work as well as refused to take her discharge paper work along with her prescription for antibiotics. When writer tried to explain to patient that we are trying to make a plan with the pain team to better control pain and come up with a safe pain medication regiment for her admission. Pt became upset and said, \"I do not want suboxone, it does not work I want to try a fentanyl patch.\" Writer explained that we would need to consult pain team to see what their recommendations might be. Pt became upset and turned around which the  stated, \"she is no longer listening.\" Writer reapporoched pt and asked if we could take her IV out, pt continued to refuse. Pt also continued to yell out at staff saying, \"fuck you, go away.\" Pt threw her suitcase across the room, security was called to assist with situation. Pt finally agreed to let charge nurse take her IV out. Once done patient ripped up her discharge paper work and perscription and stated, \"Never call me an addict again.\" Pt was then escorted out with security.   "

## 2021-09-05 NOTE — DISCHARGE INSTRUCTIONS
You were admitted to the hospital but you decided to leave AMA.  I had ordered pain medicines, and antibiotics.  I recommend that you see a pain specialist and an addiction specialist.  You were not interested in this.  You are at risk for worsening pneumonia.  Please take Zithromax as prescribed for pneumonia.  Please make an appointment to follow up with Pain Clinic (phone: [phone]) in 7 days.  You would benefit from seeing an addiction specialist  These follow-up in the [facility] clinic at [address] [place] [address].  [phone]

## 2021-09-05 NOTE — TELEPHONE ENCOUNTER
"Calling with the use of a .   Patient states she has a headache, sore throat and fever 101.8 (orally).   SX began last night. Getting worse today. \"I don't feel good at all\". HA pain currently=\"6\", all my muscles hurt too, Sore throat pain currently =\"5\". She has not taken anything OTC for the pain yet. She has Tylenol, but states it does not work.   Hx of asthma and kidney disease. No difficulty breathing. O2 sat =97%     At this point in the triage, patient hung up--so that triage was not completed. Throughout this call, it was very difficult to hear the --volume not loud enough to hear well.     Per chart review: patient was seen in [facility] ER 9/3/2021 and discharged @ 0113. Left against medical advice. Also seen @ [facility] ER from 8/31-9/1/2021. She was advised to follow up with her providers. COVID tests were done, last = 9/3 and it was negative.    Will wait for patient to call back to complete triage.     Doretha Rachel RN Triage Nurse Advisor 10:52 PM 9/4/2021    Additional Information    Negative: SEVERE difficulty breathing (e.g., struggling for each breath, speaks in single words)    Negative: Difficult to awaken or acting confused (e.g., disoriented, slurred speech)    Negative: Bluish (or gray) lips or face now    Negative: Shock suspected (e.g., cold/pale/clammy skin, too weak to stand, low BP, rapid pulse)    Negative: Sounds like a life-threatening emergency to the triager    Negative: [1] COVID-19 exposure AND [2] has not completed COVID-19 vaccine series AND [3] no symptoms    Negative: [1] COVID-19 exposure AND [2] completed COVID-19 vaccine series (fully vaccinated) AND [3] no symptoms    Negative: COVID-19 vaccine reaction suspected (e.g., fever, headache, muscle aches) occurring during days 1-3 after getting vaccine    Negative: COVID-19 vaccine, questions about    Negative: [1] COVID-19 vaccine series completed (fully vaccinated) in past 3 months AND [2] new-onset of " COVID-19 symptoms BUT [3] no known exposure    Negative: [1] Had lab test confirmed COVID-19 infection within last 3 monthsAND [2] new-onset of COVID-19 symptoms BUT [3] no known exposure    Negative: [1] Lives with someone known to have influenza (flu test positive) AND [2] flu-like symptoms (e.g., cough, runny nose, sore throat, SOB; with or without fever)    Negative: [1] Adult with possible COVID-19 symptoms AND [2] triager concerned about severity of symptoms or other causes    Negative: COVID-19 and breastfeeding, questions about    Negative: SEVERE or constant chest pain or pressure (Exception: mild central chest pain, present only when coughing)    Negative: MODERATE difficulty breathing (e.g., speaks in phrases, SOB even at rest, pulse 100-120)    Negative: [1] Headache AND [2] stiff neck (can't touch chin to chest)    Negative: MILD difficulty breathing (e.g., minimal/no SOB at rest, SOB with walking, pulse <100)    Negative: Chest pain or pressure    Negative: Patient sounds very sick or weak to the triager    Negative: Fever > 103 F (39.4 C)    Negative: [1] Fever > 101 F (38.3 C) AND [2] age > 60 years    Negative: [1] Fever > 100.0 F (37.8 C) AND [2] bedridden (e.g., nursing home patient, CVA, chronic illness, recovering from surgery)    Protocols used: CORONAVIRUS (COVID-19) DIAGNOSED OR KURFLEWSE-X-MF 3.25

## 2021-09-06 NOTE — DISCHARGE SUMMARY
[facility]  Hospitalist Discharge Summary      Date of Admission:  03-Sep-2021  Date patient left against medical advice: 04-Sep-2021  Discharging Provider: Sukumar Schroeder MD      Discharge Diagnoses   1.) Fever of unclear etiology  2.) Bilateral lower leg weakness and pain  3.) Hypertension  4.) Crohn's disease  5.) Mild intermittent asthma  6.) GERD  7.) Chronic back pain  8.) Depression and anxiety  9.) Easy bruising  10.) Possible past drug-seeking behaviour      Discharge Disposition   Patient left against medical advice on 04-Sep-2021    Hospital Course     Patient is a 32 y/o woman who has chronic back pain, Crohn's disease and bipolar disorder. Patient initially presented with fever and bilateral leg weakness and numbness. As patient had no other overt symptoms of infection, plan was to obtain blood cultures, obtain evaluation by Infectious Disease and monitor for more overt signs and symptoms of infection. Patient left against medical advice before being seen by Infectious Disease or further workup.    Patient had venous dopplers ordered for both lower extremities due to reported areas of swelling. Left leg was negative for DVT. Patient declined imaging of right leg.       Sukumar Schroeder MD  [facility] MED SURG ORTHOPEDIC  [address] [place] [id number]  Phone: [phone]  Fax: [phone]  ______________________________________________________________________      Primary Care Physician   Aura Rosario      Allergies   Allergies   Allergen Reactions     Iohexol Hives     Other reaction(s): Hives  IV contrast; patient states she tolerates contrast if she gets benadryl before  IV contrast; patient states she tolerates contrast if she gets benadryl before     Other Environmental Allergy Rash     Plastic tape     Baclofen      hives     Bees Hives, Swelling and Difficulty breathing     Contrast Dye      Hives,   Updated  5/10/2016 CT Contrast.     Iodine Hives     Metoclopramide      Other reaction(s): Tremors  LW Reaction: shaking/sweating     Midazolam      Other reaction(s): Agitation     Monosodium Glutamate      Morphine Other (See Comments)     Difficulty with urination     Nsaids Other (See Comments)     GI BLEED x2     Other (Do Not Use) Other (See Comments)     Xanaflex- pt becomes disoriented and loses bladder control     Reglan [Metoclopramide Hcl] Other (See Comments)     shaking     Soma [Carisoprodol] Visual Disturbance     Sleep walking     Tizanidine      Topamax Other (See Comments)     Topamax [Topiramate] Nausea     Tingling  GI/Vomit     Tramadol      Severe Headache, Seizure Risk     Tylenol W/Codeine [Acetaminophen-Codeine] Nausea and Itching     Tylenol 3     Versed Other (See Comments)     Zolmitriptan      Makes face feel like its twitching     Droperidol Anxiety     Flu Virus Vaccine Rash      Arm swelling

## 2021-09-07 ENCOUNTER — NURSE TRIAGE (OUTPATIENT)
Dept: FAMILY MEDICINE | Facility: OTHER | Age: 31
End: 2021-09-07

## 2021-09-07 ENCOUNTER — PATIENT OUTREACH (OUTPATIENT)
Dept: CARE COORDINATION | Facility: CLINIC | Age: 31
End: 2021-09-07

## 2021-09-07 ENCOUNTER — MYC MEDICAL ADVICE (OUTPATIENT)
Dept: FAMILY MEDICINE | Facility: OTHER | Age: 31
End: 2021-09-07

## 2021-09-07 DIAGNOSIS — K50.10 CROHN'S DISEASE OF COLON WITHOUT COMPLICATION (H): ICD-10-CM

## 2021-09-07 DIAGNOSIS — F43.23 ADJUSTMENT DISORDER WITH MIXED ANXIETY AND DEPRESSED MOOD: ICD-10-CM

## 2021-09-07 DIAGNOSIS — Z71.89 OTHER SPECIFIED COUNSELING: ICD-10-CM

## 2021-09-07 NOTE — PROGRESS NOTES
Clinic Care Coordination Contact    Background: Care Coordination referral placed from [facility] discharge report for reason of patient meeting criteria for a TCM outreach call by [facility] team.    Assessment: Upon chart review, [facility] Team member will cancel/close the referral for TCM outreach due to reason below:      Patient has presented to Emergency Department at [facility] or has been readmitted to hospital.      Plan: Care Coordination referral for TCM outreach canceled.    Nora Bernard MA  [facility], [facility]

## 2021-09-07 NOTE — TELEPHONE ENCOUNTER
"Additional Information    Negative: Shock suspected (e.g., cold/pale/clammy skin, too weak to stand, low BP, rapid pulse)    Negative: Difficult to awaken or acting confused (e.g., disoriented, slurred speech)    Negative: Sounds like a life-threatening emergency to the triager    Negative: Vomiting occurs only while coughing    Negative: Pregnant < 20 Weeks and nausea/vomiting began in early pregnancy (i.e., 4-8 weeks pregnant)    Negative: Chest pain    Negative: Headache is main symptom    Negative: SEVERE vomiting (e.g., 6 or more times/day) (Exception: patient sounds well, is drinking liquids, does not sound dehydrated, and vomiting has lasted less than 24 hours)    Negative: MODERATE vomiting (e.g., 3 - 5 times/day) and age > 60    Negative: Vomiting contains bile (green color)    Negative: Vomiting red blood or black (coffee ground) material    Negative: Insulin-dependent diabetes and glucose > 240 mg/dL (13 mmol/L)    Negative: Recent head injury (within 3 days)    Negative: Recent abdominal injury (within 7 days)    Negative: Drinking very little and has signs of dehydration (e.g., no urine > 12 hours, very dry mouth, very lightheaded)    Negative: Constant abdominal pain lasting > 2 hours    Negative: High-risk adult (e.g., brain tumor, V-P shunt, hernia)    Negative: Severe pain in one eye    Negative: Patient sounds very sick or weak to the triager    Answer Assessment - Initial Assessment Questions  1. VOMITING SEVERITY: \"How many times have you vomited in the past 24 hours?\"      - MILD:  1 - 2 times/day     - MODERATE: 3 - 5 times/day, decreased oral intake without significant weight loss or symptoms of dehydration     - SEVERE: 6 or more times/day, vomits everything or nearly everything, with significant weight loss, symptoms of dehydration       Mild  2. ONSET: \"When did the vomiting begin?\"       On and off for a while, was worse this AM.  3. FLUIDS: \"What fluids or food have you vomited up today?\" " "\"Have you been able to keep any fluids down?\"      Able to keep banana and PO fluids  4. ABDOMINAL PAIN: \"Are your having any abdominal pain?\" If yes : \"How bad is it and what does it feel like?\" (e.g., crampy, dull, intermittent, constant)       Report some cramping when vomiting, feels ok at this time.  5. DIARRHEA: \"Is there any diarrhea?\" If so, ask: \"How many times today?\"       Denies  6. CONTACTS: \"Is there anyone else in the family with the same symptoms?\"       Denies  7. CAUSE: \"What do you think is causing your vomiting?\"      Unsure  8. HYDRATION STATUS: \"Any signs of dehydration?\" (e.g., dry mouth [not only dry lips], too weak to stand) \"When did you last urinate?\"      Still urinating.  9. OTHER SYMPTOMS: \"Do you have any other symptoms?\" (e.g., fever, headache, vertigo, vomiting blood or coffee grounds, recent head injury)      Reports slight fever, 101.2, has been taking Tylenol. Headaches for past month.  10. PREGNANCY: \"Is there any chance you are pregnant?\" \"When was your last menstrual period?\"        Denies.    Protocols used: VOMITING-A-OH    Reports this AM when seen in ED for vomiting and discharged and should continue to medications for pneumonia. Also seen in ED at [facility] two days ago and left AMA but was at that time suspected of developing pneumonia.     She reports she has been having vomiting off and on for past week. Reports she has kept small amount of food down this AM. Discussed that currently there are not office visits, and made first available appointment with provider for tomorrow. Discussed if anything changes, or worsens with vomiting or not being able to keep PO intake, she should be seen again in emergency department.    Gurmeet Smith, MAUREEN, BSN      "

## 2021-09-08 ENCOUNTER — APPOINTMENT (OUTPATIENT)
Dept: GENERAL RADIOLOGY | Facility: CLINIC | Age: 31
End: 2021-09-08
Attending: FAMILY MEDICINE
Payer: MEDICARE

## 2021-09-08 ENCOUNTER — HOSPITAL ENCOUNTER (EMERGENCY)
Facility: CLINIC | Age: 31
Discharge: HOME | End: 2021-09-08
Attending: FAMILY MEDICINE | Admitting: FAMILY MEDICINE
Payer: MEDICARE

## 2021-09-08 ENCOUNTER — DOCUMENTATION ONLY (OUTPATIENT)
Dept: CARE COORDINATION | Facility: CLINIC | Age: 31
End: 2021-09-08

## 2021-09-08 VITALS
RESPIRATION RATE: 18 BRPM | SYSTOLIC BLOOD PRESSURE: 139 MMHG | OXYGEN SATURATION: 99 % | HEART RATE: 103 BPM | DIASTOLIC BLOOD PRESSURE: 104 MMHG | TEMPERATURE: 98.2 F

## 2021-09-08 DIAGNOSIS — K50.10 CROHN'S DISEASE OF COLON WITHOUT COMPLICATION (H): ICD-10-CM

## 2021-09-08 DIAGNOSIS — S80.02XA CONTUSION OF LEFT KNEE AND LOWER LEG, INITIAL ENCOUNTER: ICD-10-CM

## 2021-09-08 DIAGNOSIS — S80.12XA CONTUSION OF LEFT KNEE AND LOWER LEG, INITIAL ENCOUNTER: ICD-10-CM

## 2021-09-08 DIAGNOSIS — R29.6 FALLS FREQUENTLY: ICD-10-CM

## 2021-09-08 DIAGNOSIS — S70.02XA CONTUSION OF LEFT HIP, INITIAL ENCOUNTER: ICD-10-CM

## 2021-09-08 LAB
ANION GAP SERPL CALCULATED.3IONS-SCNC: 5 MMOL/L (ref 3–14)
BASOPHILS # BLD AUTO: 0 10E3/UL (ref 0–0.2)
BASOPHILS NFR BLD AUTO: 0 %
BUN SERPL-MCNC: 21 MG/DL (ref 7–30)
CALCIUM SERPL-MCNC: 8.7 MG/DL (ref 8.5–10.1)
CHLORIDE BLD-SCNC: 109 MMOL/L (ref 94–109)
CO2 SERPL-SCNC: 29 MMOL/L (ref 20–32)
CREAT SERPL-MCNC: 0.95 MG/DL (ref 0.52–1.04)
EOSINOPHIL # BLD AUTO: 0.2 10E3/UL (ref 0–0.7)
EOSINOPHIL NFR BLD AUTO: 3 %
ERYTHROCYTE [DISTWIDTH] IN BLOOD BY AUTOMATED COUNT: 12.1 % (ref 10–15)
GFR SERPL CREATININE-BSD FRML MDRD: 80 ML/MIN/1.73M2
GLUCOSE BLD-MCNC: 70 MG/DL (ref 70–99)
HCT VFR BLD AUTO: 31.7 % (ref 35–47)
HGB BLD-MCNC: 11 G/DL (ref 11.7–15.7)
IMM GRANULOCYTES # BLD: 0 10E3/UL
IMM GRANULOCYTES NFR BLD: 0 %
LACTATE SERPL-SCNC: 0.7 MMOL/L (ref 0.7–2)
LYMPHOCYTES # BLD AUTO: 1.8 10E3/UL (ref 0.8–5.3)
LYMPHOCYTES NFR BLD AUTO: 33 %
MCH RBC QN AUTO: 33.8 PG (ref 26.5–33)
MCHC RBC AUTO-ENTMCNC: 34.7 G/DL (ref 31.5–36.5)
MCV RBC AUTO: 98 FL (ref 78–100)
MONOCYTES # BLD AUTO: 0.6 10E3/UL (ref 0–1.3)
MONOCYTES NFR BLD AUTO: 11 %
NEUTROPHILS # BLD AUTO: 2.8 10E3/UL (ref 1.6–8.3)
NEUTROPHILS NFR BLD AUTO: 53 %
NRBC # BLD AUTO: 0 10E3/UL
NRBC BLD AUTO-RTO: 0 /100
PLATELET # BLD AUTO: 235 10E3/UL (ref 150–450)
POTASSIUM BLD-SCNC: 3.5 MMOL/L (ref 3.4–5.3)
RBC # BLD AUTO: 3.25 10E6/UL (ref 3.8–5.2)
SODIUM SERPL-SCNC: 143 MMOL/L (ref 133–144)
WBC # BLD AUTO: 5.3 10E3/UL (ref 4–11)

## 2021-09-08 PROCEDURE — 73502 X-RAY EXAM HIP UNI 2-3 VIEWS: CPT

## 2021-09-08 PROCEDURE — 36415 COLL VENOUS BLD VENIPUNCTURE: CPT | Performed by: FAMILY MEDICINE

## 2021-09-08 PROCEDURE — 83605 ASSAY OF LACTIC ACID: CPT | Performed by: FAMILY MEDICINE

## 2021-09-08 PROCEDURE — 80048 BASIC METABOLIC PNL TOTAL CA: CPT | Performed by: FAMILY MEDICINE

## 2021-09-08 PROCEDURE — 85025 COMPLETE CBC W/AUTO DIFF WBC: CPT | Performed by: FAMILY MEDICINE

## 2021-09-08 PROCEDURE — 99285 EMERGENCY DEPT VISIT HI MDM: CPT | Mod: 25 | Performed by: FAMILY MEDICINE

## 2021-09-08 PROCEDURE — 99285 EMERGENCY DEPT VISIT HI MDM: CPT

## 2021-09-08 PROCEDURE — 73562 X-RAY EXAM OF KNEE 3: CPT | Mod: LT

## 2021-09-08 PROCEDURE — 99284 EMERGENCY DEPT VISIT MOD MDM: CPT | Performed by: FAMILY MEDICINE

## 2021-09-08 RX ORDER — HYDROMORPHONE HYDROCHLORIDE 2 MG/1
2 TABLET ORAL EVERY 4 HOURS PRN
Qty: 12 TABLET | Refills: 0 | OUTPATIENT
Start: 2021-09-08

## 2021-09-08 RX ORDER — DULOXETIN HYDROCHLORIDE 30 MG/1
60 CAPSULE, DELAYED RELEASE ORAL DAILY
Qty: 180 CAPSULE | Refills: 1 | Status: SHIPPED | OUTPATIENT
Start: 2021-09-08

## 2021-09-08 NOTE — TELEPHONE ENCOUNTER
BACKGROUND:   Pain    SITUATION:   Patient is calling stating that she is feeling sad. She states that her legs are painful and rated at a 7/10. She states the bruising on her left leg is worse. Patient states the pain is worse standing/movement. She feels her knees are going to buckle. She is out of her Dilaudid and requesting this to be filled. (pended)     Patient was seen in ER today and yesterday.     PLAN:   Huddle with Zach.          EDYTA العليN, RN, PHN  [place]  September 8, 2021

## 2021-09-08 NOTE — ED NOTES
"X-Ray tech came out of patient's room and informed RN and provider that patient \"wants Dilaudid before she goes to X-Ray.\" RN entered the room and used the  to inform the patient that Dr. Swanson already discussed with her that she will not be receiving any narcotic medications while she is in the ED. Patient stated, \"okay.\"  "

## 2021-09-08 NOTE — PROGRESS NOTES
[facility] now requests orders and shares plan of care/discharge summaries for some patients through [employer].  Please REPLY TO THIS MESSAGE OR ROUTE BACK TO THE AUTHOR in order to give authorization for orders when needed.  This is considered a verbal order, you will still receive a faxed copy of orders for signature.  Thank you for your assistance in improving collaboration for our patients.    Referral received for patient and due to patient leaving AMA with most recently hospitalization home care will have to decline this referral.  If a new referral is needed in the future, please call to discuss referral options.    Odalys Mendez RN  [phone]

## 2021-09-08 NOTE — ED PROVIDER NOTES
History     Chief Complaint   Patient presents with     Hip Pain     HPI  Katy Islas is a 31 year old female who presents to the ED by ambulance after falling again in injuring her left hip.  She uses a walker.  States she sometimes slips and loses her balance which she did tonight.  Other times she has passed out.  This is been going on since January.  She has been seen in multiple different emergency departments on multiple different occasions.  Not infrequently she leaves AMA when she realizes she is not going to get narcotic pain medication.  She has a care plan and is not supposed to be given narcotics or prescribed narcotics to the emergency department.    Just seen yesterday at [facility] emergency department because of fall.  She was getting out of the bathtub and struck her right flank.  Currently taking Omnicef and Zithromax for left lower lobe pneumonia.    Allergies:  Allergies   Allergen Reactions     Iohexol Hives     Other reaction(s): Hives  IV contrast; patient states she tolerates contrast if she gets benadryl before  IV contrast; patient states she tolerates contrast if she gets benadryl before     Other Environmental Allergy Rash     Plastic tape     Baclofen      hives     Bees Hives, Swelling and Difficulty breathing     Contrast Dye      Hives,   Updated 5/10/2016 CT Contrast.     Iodine Hives     Metoclopramide      Other reaction(s): Tremors  LW Reaction: shaking/sweating     Midazolam      Other reaction(s): Agitation     Monosodium Glutamate      Morphine Other (See Comments)     Difficulty with urination     Nsaids Other (See Comments)     GI BLEED x2     Other (Do Not Use) Other (See Comments)     Xanaflex- pt becomes disoriented and loses bladder control     Reglan [Metoclopramide Hcl] Other (See Comments)     shaking     Soma [Carisoprodol] Visual Disturbance     Sleep walking     Tizanidine      Topamax Other (See Comments)     Topamax [Topiramate] Nausea      Tingling  GI/Vomit     Tramadol      Severe Headache, Seizure Risk     Tylenol W/Codeine [Acetaminophen-Codeine] Nausea and Itching     Tylenol 3     Versed Other (See Comments)     Zolmitriptan      Makes face feel like its twitching     Droperidol Anxiety     Flu Virus Vaccine Rash      Arm swelling       Problem List:    Patient Active Problem List    Diagnosis Date Noted     Pneumonia of left lower lobe due to infectious organism 09/05/2021     Priority: Medium     Nausea and vomiting, intractability of vomiting not specified, unspecified vomiting type 09/05/2021     Priority: Medium     Fever and chills 09/03/2021     Priority: Medium     Bilateral leg pain 09/03/2021     Priority: Medium     Bilateral leg weakness 09/03/2021     Priority: Medium     Antalgic gait 09/03/2021     Priority: Medium     Chronic midline low back pain without sciatica 09/03/2021     Priority: Medium     Tachycardia, unspecified 09/01/2021     Priority: Medium     Acute renal failure, unspecified acute renal failure type (H) 09/01/2021     Priority: Medium     Encounter for screening laboratory testing for severe acute respiratory syndrome coronavirus 2 (SARS-CoV-2) 09/01/2021     Priority: Medium     Sinus tachycardia 08/30/2021     Priority: Medium     Myalgia 08/30/2021     Priority: Medium     Acute kidney injury (H) 08/30/2021     Priority: Medium     Fever, unspecified fever cause 08/30/2021     Priority: Medium     Paresthesias 08/24/2021     Priority: Medium     Multiple bruises 08/24/2021     Priority: Medium     Hematoma of right kidney, initial encounter 08/24/2021     Priority: Medium     Verbally abusive behavior 05/27/2021     Priority: Medium     Birth elliot 05/19/2021     Priority: Medium     Suicidal behavior 05/14/2021     Priority: Medium     Renal cyst 05/12/2021     Priority: Medium     Borderline personality disorder (H) 04/27/2021     Priority: Medium     Lymphedema 09/10/2020     Priority: Medium      Patellofemoral pain syndrome of both knees 06/11/2020     Priority: Medium     Primary osteoarthritis of both knees 06/11/2020     Priority: Medium     Abscess of anal and rectal regions 04/27/2020     Priority: Medium     Added automatically from request for surgery [id number]       Rectal pain 04/19/2020     Priority: Medium     Abdominal pain 04/18/2020     Priority: Medium     Morbid obesity (H) 03/10/2020     Priority: Medium     Abnormal uterine bleeding 02/12/2020     Priority: Medium     Added automatically from request for surgery [id number]       Anal fissure 02/04/2020     Priority: Medium     Low hematocrit 02/04/2020     Priority: Medium     Elevated d-dimer 02/04/2020     Priority: Medium     Hypertension goal BP (blood pressure) < 130/80 08/05/2019     Priority: Medium     Bulge of cervical disc without myelopathy 04/24/2019     Priority: Medium     Cervicalgia 04/17/2019     Priority: Medium     Class 1 obesity due to excess calories without serious comorbidity with body mass index (BMI) of 32.0 to 32.9 in adult 04/03/2019     Priority: Medium     Hypophosphatemia 11/01/2018     Priority: Medium     Patellar maltracking, right 09/05/2018     Priority: Medium     Right anterior knee pain 09/05/2018     Priority: Medium     Melanocytic nevus, unspecified location 07/23/2018     Priority: Medium     Dysplastic skin lesion 07/23/2018     Priority: Medium     Iliotibial band syndrome affecting lower leg, right 12/21/2017     Priority: Medium     Chondromalacia of right patellofemoral joint 12/21/2017     Priority: Medium     Upper GI bleed - suspected 07/01/2017     Priority: Medium     Tobacco use disorder 07/01/2017     Priority: Medium     History of pseudoseizure 07/01/2017     Priority: Medium     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) 03/10/2017     Priority: Medium     Lumbar disc disease with radiculopathy 02/23/2017     Priority: Medium     S/P lumbar fusion 02/23/2017     Priority: Medium      Dr. YOVANI Millan, 2/23/17       Vitamin D deficiency 01/06/2017     Priority: Medium     Atypical mole 01/06/2017     Priority: Medium     Mild persistent asthma without complication 12/02/2016     Priority: Medium     Chronic bilateral low back pain with left-sided sciatica 12/02/2016     Priority: Medium     Bee sting allergy 09/16/2016     Priority: Medium     C. difficile colitis 08/26/2016     Priority: Medium     Gastroesophageal reflux disease without esophagitis 08/26/2016     Priority: Medium     Herpes simplex virus infection 11/12/2015     Priority: Medium     Adjustment disorder with mixed anxiety and depressed mood 10/14/2015     Priority: Medium     Bipolar disorder (H) 01/31/2013     Priority: Medium     Chronic pain 02/09/2012     Priority: Medium     Patient is followed by Aura Rosario PA-C for ongoing prescription of pain medication.  All refills should only be approved by this provider, or covering partner.    Medication(s): Norco    Maximum quantity per month: 70  Clinic visit frequency required: Q 2 months     Controlled substance agreement:   Discussed and signed 4/25/17    Encounter-Level CSA - 01/12/2015:                 Controlled Substance Agreement - Scan on 1/26/2015  9:14 AM : Controlled Medication Agreement-01/12/15 (below)            Pain Clinic evaluation in the past: Yes       Date/Location:   [facility], fall 2016    DIRE Total Score(s):    4/25/2017   Total Score 17       Last [facility] website verification:  done on 4/25/17 by LOVE Maki   [email or web address]           JULIETH (generalized anxiety disorder) 09/22/2011     Priority: Medium     Mild major depression (H) 08/29/2011     Priority: Medium     Crohn's disease of colon without complication (H) 08/04/2009     Priority: Medium     Dysmenorrhea 05/11/2007     Priority: Medium     Benign neoplasm of skin of lower limb, including hip 03/16/2004     Priority: Medium     Migraine      Priority:  Medium     Dr. Farrar - Neurology.  Now on Inderal.  Seems to be working.  Follow-up 9/08  Problem list name updated by automated process. Provider to review       Hearing loss      Priority: Medium     Congenital  Problem list name updated by automated process. Provider to review       Allergic rhinitis      Priority: Medium     Problem list name updated by automated process. Provider to review          Past Medical History:    Past Medical History:   Diagnosis Date     Abdominal pain 10/31- 11/4/2005     Allergic rhinitis, cause unspecified      Arthritis      C. 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 partial seizures, without mention of intractable epilepsy      Migraine 07/21/12     Migraine, unspecified, without mention of intractable migraine without mention of status migrainosus      Mild intermittent asthma      Mycoplasma infection in conditions classified elsewhere and of unspecified site      Other chronic pain      Renal disease      Unspecified hearing loss        Past Surgical History:    Past Surgical History:   Procedure Laterality Date     AS REMOVAL OF COCCYX  10/18/2017     C FUSION OF SACROILIAC JOINT  10/26/2018     COLONOSCOPY  7/1/2009    [facility].     COLONOSCOPY N/A 7/17/2019    Procedure: Colonoscopy, With Polypectomy And Biopsy;  Surgeon: Edwin Conde MD;  Location: [facility] OR     COLONOSCOPY WITH CO2 INSUFFLATION N/A 7/17/2019    Procedure: COLONOSCOPY, WITH CO2 INSUFFLATION;  Surgeon: Edwin Conde MD;  Location: [facility] OR     COMBINED ESOPHAGOSCOPY, GASTROSCOPY, DUODENOSCOPY (EGD) WITH CO2 INSUFFLATION N/A 4/12/2019    Procedure:  COMBINED ESOPHAGOSCOPY, GASTROSCOPY, DUODENOSCOPY (EGD) WITH CO2 INSUFFLATION;  Surgeon: Edwin Conde MD;  Location: [facility] OR     ESOPHAGOSCOPY, GASTROSCOPY, DUODENOSCOPY (EGD), COMBINED N/A 4/12/2019    Procedure: Combined Esophagoscopy, Gastroscopy, Duodenoscopy (Egd), Biopsy Single Or Multiple;  Surgeon: Edwin Conde MD;  Location: [facility] OR     FISTULOTOMY RECTUM N/A 4/30/2020    Procedure: EXAM UNDER ANESTHESIA, ANUS, parital fistulotomy;  Surgeon: Valentin Sanchez MD;  Location: [facility] OR     FUSION SPINE POSTERIOR MINIMALLY INVASIVE ONE LEVEL N/A 2/23/2017    Procedure: FUSION SPINE POSTERIOR MINIMALLY INVASIVE ONE LEVEL;  L4-5 Oblique Lateral Lumbar Interbody Fusion   Epidural steroid injection.   Transpedicular Bone marrow aspiration;  Surgeon: Jeniffer Eugene MD;  Location: [facility] OR     HC EEG AWAKE AND SLEEP      abnormal     HC MRI BRAIN W/O CONTRAST  12/00    normal     HC REMOVAL GALLBLADDER  8/5/2009    [facility], [facility].     HC UGI ENDOSCOPY DIAG W BIOPSY  11/11/09    Normal esophagus     ORTHOPEDIC SURGERY  October 19,2011    diskectomy L4-L5     ZZ COLONOSCOPY THRU STOMA WITH BIOPSY  10/29/2003    Impression is that of normal appearing colonoscopy, without evidence of rectal bleeding.     ZZ COLONOSCOPY THRU STOMA, DIAGNOSTIC  10/00    normal     ZZ COLONOSCOPY THRU STOMA, DIAGNOSTIC  Oct 2009    Dr López- normal     [facility] UGI ENDOSCOPY, SIMPLE EXAM  7/00, 10/00    mild chronic esophagitis and duodenitis, neg H pylori     [facility] UGI ENDOSCOPY, SIMPLE EXAM  01/20/2005    Esophagogastroduodenoscopy, colonoscopy with biopsies.  [facility] UGI ENDOSCOPY, SIMPLE EXAM  7/1/2009    [facility], [facility].     [facility] UGI ENDOSCOPY, SIMPLE EXAM  11/11/2009    attempted upper GI, pt. could not tolerate procedure:[facility]       Family History:    Family History   Problem Relation Age of Onset     Gastrointestinal Disease  Brother         severe Crohn's     Neurologic Disorder Brother         Seizures post head injury     Depression Brother      Substance Abuse Brother      Genitourinary Problems Father         kidney stones     Diabetes Father      Heart Disease Father         Open heart surgery     Breast Cancer Maternal Grandmother      Parkinsonism Maternal Grandmother      Cerebrovascular Disease Paternal Grandmother      Cancer Maternal Grandfather         Lung     Cardiovascular Paternal Grandfather         Heart Attack     Substance Abuse Mother         in recovery x1 year      Lung Cancer Paternal Uncle      Cancer Paternal Uncle      Lung Cancer Maternal Uncle      Colon Cancer Maternal Uncle        Social History:  Marital Status:  Single [1]  Social History     Tobacco Use     Smoking status: Current Every Day Smoker     Packs/day: 0.25     Years: 5.00     Pack years: 1.25     Types: Cigarettes     Smokeless tobacco: Never Used   Vaping Use     Vaping Use: Some days     Substances: CBD     Devices: Disposable, Pre-filled or refillable cartridge   Substance Use Topics     Alcohol use: Not Currently     Comment: Not since last July 2018     Drug use: Not Currently     Types: Marijuana     Comment: Medical Marijuana currently-- More CBD        Medications:    albuterol (PROAIR HFA/PROVENTIL HFA/VENTOLIN HFA) 108 (90 Base) MCG/ACT inhaler  ARIPiprazole (ABILIFY) 30 MG tablet  azelastine (OPTIVAR) 0.05 % ophthalmic solution  azithromycin (ZITHROMAX Z-ILAN) 250 MG tablet  buPROPion (WELLBUTRIN XL) 300 MG 24 hr tablet  busPIRone (BUSPAR) 15 MG tablet  clonazePAM (KLONOPIN) 1 MG tablet  cyclobenzaprine (FLEXERIL) 10 MG tablet  dicyclomine (BENTYL) 20 MG tablet  DULoxetine (CYMBALTA) 30 MG capsule  EPINEPHrine (ANY BX GENERIC EQUIV) 0.3 MG/0.3ML injection 2-pack  furosemide (LASIX) 20 MG tablet  gabapentin (NEURONTIN) 300 MG capsule  HYDROmorphone (DILAUDID) 2 MG tablet  lidocaine (XYLOCAINE) 2 % solution  medroxyPROGESTERone  (DEPO-PROVERA) 150 MG/ML IM injection  mesalamine ER (PENTASA) 500 MG CR capsule  metoprolol succinate ER (TOPROL-XL) 50 MG 24 hr tablet  ondansetron (ZOFRAN-ODT) 4 MG ODT tab  pantoprazole (PROTONIX) 40 MG EC tablet  polyethylene glycol (MIRALAX) 17 g packet  rizatriptan (MAXALT-MLT) 5 MG ODT  sucralfate (CARAFATE) 1 GM tablet          Review of Systems   All other systems reviewed and are negative.      Physical Exam   BP: (!) 138/100  Pulse: 109  Temp: 98.2  F (36.8  C)  Resp: 18  SpO2: 97 %      Physical Exam  Constitutional:       General: She is not in acute distress.     Appearance: Normal appearance.      Comments: ASL through video    HENT:      Head: Normocephalic and atraumatic.   Eyes:      Extraocular Movements: Extraocular movements intact.      Pupils: Pupils are equal, round, and reactive to light.   Cardiovascular:      Rate and Rhythm: Normal rate and regular rhythm.   Pulmonary:      Effort: Pulmonary effort is normal. No respiratory distress.   Musculoskeletal:      Comments: Multiple contusions of the lower legs especially the left side.  Tenderness diffusely over the left knee with somewhat exaggerated pain response to even light palpation.  Moderate tenderness over the lateral left hip.   Neurological:      Mental Status: She is alert and oriented to person, place, and time.      Cranial Nerves: Cranial nerve deficit ( deaf, no acute deficits) present.      Sensory: Sensation is intact.      Motor: Motor function is intact.   Psychiatric:         Mood and Affect: Mood normal.         ED Course  (with Medical Decision Making)    31-year-old has been falling frequently fell tonight landing on her left hip.  Complaining of left hip and knee pain.  X-rays of the hip and knee were negative for fracture.  She has multiple contusions along the entire left leg more so than the right leg.  States she has been passing out and falling but she declined an EKG.  She does not think she needs it.   She has been seen in multiple different emergency departments, sometimes 3 different ED's in the same day.  Is currently on antibiotics for left lower lobe infiltrate versus atelectasis.  White count is normal at 5.3.  Lactic acid normal.  Hemoglobin 11.0.    She refused an EKG.    She just had a head CT done 2 weeks ago which was unremarkable.  We are not going to irradiate her more today since she did not hit her head this time around.    X-rays of the left hip and knee were negative for fracture.  She had requested Dilaudid before going to x-ray but that was kindly declined.  She will have to get any pain medication to her primary physician.  She will contact her today through [facility].  Written discharge instructions given.  She is comfortable with this plan.          Procedures              Critical Care time:  none                  Results for orders placed or performed during the hospital encounter of 09/08/21 (from the past 24 hour(s))   XR Pelvis and Hip Left 2 Views    Narrative    EXAM: XR PELVIS AND HIP LEFT 2 VIEWS  LOCATION: [facility]  DATE/TIME: 9/8/2021 5:34 AM    INDICATION: Pain after fall.  COMPARISON: None.      Impression    IMPRESSION: No fracture or dislocation. Postoperative changes in the lumbar spine and left SI joint.   XR Knee Left 3 Views    Narrative    EXAM: XR KNEE LEFT 3 VIEWS  LOCATION: [facility]  DATE/TIME: 9/8/2021 5:35 AM    INDICATION: Pain after fall.  COMPARISON: None.      Impression    IMPRESSION: Normal joint spaces and alignment. No fracture or joint effusion.   CBC with platelets differential    Narrative    The following orders were created for panel order CBC with platelets differential.  Procedure                               Abnormality         Status                     ---------                               -----------         ------                     CBC with platelets and d...[[id number]]   Abnormal            Final result                 Please view results for these tests on the individual orders.   Basic metabolic panel   Result Value Ref Range    Sodium 143 133 - 144 mmol/L    Potassium 3.5 3.4 - 5.3 mmol/L    Chloride 109 94 - 109 mmol/L    Carbon Dioxide (CO2) 29 20 - 32 mmol/L    Anion Gap 5 3 - 14 mmol/L    Urea Nitrogen 21 7 - 30 mg/dL    Creatinine 0.95 0.52 - 1.04 mg/dL    Calcium 8.7 8.5 - 10.1 mg/dL    Glucose 70 70 - 99 mg/dL    GFR Estimate 80 >60 mL/min/1.73m2   Lactic acid whole blood   Result Value Ref Range    Lactic Acid 0.7 0.7 - 2.0 mmol/L   CBC with platelets and differential   Result Value Ref Range    WBC Count 5.3 4.0 - 11.0 10e3/uL    RBC Count 3.25 (L) 3.80 - 5.20 10e6/uL    Hemoglobin 11.0 (L) 11.7 - 15.7 g/dL    Hematocrit 31.7 (L) 35.0 - 47.0 %    MCV 98 78 - 100 fL    MCH 33.8 (H) 26.5 - 33.0 pg    MCHC 34.7 31.5 - 36.5 g/dL    RDW 12.1 10.0 - 15.0 %    Platelet Count 235 150 - 450 10e3/uL    % Neutrophils 53 %    % Lymphocytes 33 %    % Monocytes 11 %    % Eosinophils 3 %    % Basophils 0 %    % Immature Granulocytes 0 %    NRBCs per 100 WBC 0 <1 /100    Absolute Neutrophils 2.8 1.6 - 8.3 10e3/uL    Absolute Lymphocytes 1.8 0.8 - 5.3 10e3/uL    Absolute Monocytes 0.6 0.0 - 1.3 10e3/uL    Absolute Eosinophils 0.2 0.0 - 0.7 10e3/uL    Absolute Basophils 0.0 0.0 - 0.2 10e3/uL    Absolute Immature Granulocytes 0.0 <=0.0 10e3/uL    Absolute NRBCs 0.0 10e3/uL     *Note: Due to a large number of results and/or encounters for the requested time period, some results have not been displayed. A complete set of results can be found in Results Review.       Medications - No data to display    Assessments & Plan      I have reviewed the nursing notes.    I have reviewed the findings, diagnosis, plan and need for follow up with the patient.       New Prescriptions    No medications on file       Final diagnoses:   Contusion of left hip, initial encounter   Contusion of left knee and  lower leg, initial encounter   Falls frequently       9/8/2021   [facility] EMERGENCY DEPT     Nathan Swanson MD  09/08/21 [id number]

## 2021-09-08 NOTE — TELEPHONE ENCOUNTER
Would need apt to discuss. I would need to know when her pain clinic apt is. Put on for phone visit tomorrow.     Rx denied.  reviewed.     Zach Tomlinson-LOVE Moss  [email or web address]

## 2021-09-08 NOTE — DISCHARGE INSTRUCTIONS
Ice 20 minutes per hour, (20 minutes on, 40 minutes off) at least 4 times a day.  Contact your primary physician today.  If you need further pain medications you will need to do that through your primary physician.  Your x-ray showed no fractures.  You have lots of bruising.  That will take time to heal.  Your blood work looked normal tonight.  Your blood work was reassuring.  It was good to see you again this morning.  I hope this heals up quickly for you.    Thank you for choosing [facility]. We appreciate the opportunity to meet your urgent medical needs. Please let us know if we could have done anything to make your stay more satisfying.    After discharge, please closely monitor for any new or worsening symptoms. Return to the Emergency Department if you develop any acute worsening signs or symptoms.    If you had lab work, cultures or imaging studies done during your stay, the final results may still be pending. We will call you if your plan of care needs to change. However, if you are not improving as expected, please follow up with your primary care provider or clinic.     Start any prescription medications that were prescribed to you and take them as directed.     Please see additional handouts that may be pertinent to your condition.

## 2021-09-08 NOTE — ED NOTES
Patient needs a ride home and doesn't have any money with her. Reports her mother is in [place]. RN informed patient that we can do a ONE TIME cab voucher.

## 2021-09-08 NOTE — TELEPHONE ENCOUNTER
Pending Prescriptions:                       Disp   Refills    DULoxetine (CYMBALTA) 30 MG capsule        90 cap*1        Sig: Take 2 capsules (60 mg) by mouth daily    Routing refill request to provider for review/approval because:  Labs out of range:    BP Readings from Last 3 Encounters:   09/08/21 (!) 139/104   09/05/21 (!) 105/100   09/03/21 112/85     PHQ-9 score:    PHQ 8/23/2021   PHQ-9 Total Score 7   Q9: Thoughts of better off dead/self-harm past 2 weeks Not at all   PHQ-9 External Data -

## 2021-09-08 NOTE — PROGRESS NOTES
Agree with denial. I will discuss with patient and re-order if needed.    Zach Rosario PA-C  [facility]

## 2021-09-08 NOTE — ED NOTES
"RN entered the room to do patient's EKG. RN used the  to tell the patient that Dr. Swanson ordered the EKG as she told him she has been passing out. Patient used the  to respond, \"I don't think that's necessary. My heart is fine.\" Dr. Swanson notified patient refusing EKG.  "

## 2021-09-08 NOTE — ED TRIAGE NOTES
Presents to ED via EMS from home after falling earlier this morning and hurting her left hip. Patient arrives with a walker that EMS reports she uses. EMS reports she was seen yesterday for kidney pain in [place] and was given IV fluids and nausea medications. Patient able to ambulate to bed from the stretcher with assistance. No obvious deformity. Also states she fell in the tub yesterday and had to call for help up.

## 2021-09-09 ENCOUNTER — TELEPHONE (OUTPATIENT)
Dept: FAMILY MEDICINE | Facility: OTHER | Age: 31
End: 2021-09-09

## 2021-09-09 ENCOUNTER — NURSE TRIAGE (OUTPATIENT)
Dept: NURSING | Facility: CLINIC | Age: 31
End: 2021-09-09

## 2021-09-09 RX ORDER — HYDROMORPHONE HYDROCHLORIDE 2 MG/1
2 TABLET ORAL EVERY 4 HOURS PRN
Qty: 12 TABLET | Refills: 0 | OUTPATIENT
Start: 2021-09-09

## 2021-09-09 NOTE — TELEPHONE ENCOUNTER
Patient stopped the medication due to vomiting and diarrhea.    She was taking the antibiotic with food. She also took zofran 30 min before she took her antibiotics.    She is wondering if she can just get another antibiotic instead of coming in.    She states that the pneumonia is not getting any better, she is getting worse.     She states she is unable to lay flat.    Patient advised to keep his appointment.  Marie Nichole RN on 9/9/2021 at 10:01 AM

## 2021-09-09 NOTE — TELEPHONE ENCOUNTER
Spoke with patient through intrepreter services.  She states that the combination of two antibiotics has really upset her stomach. Will try increase fluids and rest and to take them with food.  Also her bruising on her left leg is worse.   States she missed appointment today due to vomiting and diarrhea.  Advised to follow up with provider tomorrow regarding medications.    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  She sold her house and also feels that she needs to re-home her horse.        Next 5 appointments (look out 90 days)    Sep 10, 2021 10:40 AM  Office Visit with Bacilio Adams MD, ASL IS  [facility] ([facility] ) [address] [place] [address], [address]  [facility] [address]  [phone]   Sep 20, 2021  2:00 PM  SHORT with Aura Rosario PA-C  [facility] ([facility] ) [address]  [facility] [address]  [phone]        Iggy Bernstein, EDYTAN, RN, PHN  [facility] ~ Registered Nurse  Clinic Triage ~ [place] [facility] [place]  September 9, 2021

## 2021-09-09 NOTE — PROGRESS NOTES
Assessment & Plan   1. Gastroesophageal reflux disease, unspecified whether esophagitis present: GI cocktail was given to patient today for comfort.  Will do test H. pylori stool antigen as this would be easily treatable option if present.  May be falsely negative however due to PPI use.  I did discourage use of narcotic pain medications and  was reviewed.  I will be offering Tylenol 3 as she states this is also helped in the past.  I did discuss addictive potential of all narcotic pain medications but prefer this over Dilaudid.  Also recommend Carafate.  Patient stated there is a cost barrier but I was able to find a coupon on good Rx to make this more affordable for the patient.  Patient was thankful for this.  I do recommend she follow-up with her GI provider to further discuss options for management or confirmation of diagnosis including pH monitoring, Nissen fundoplication, etc.  Unfortunately she also be moving to [place] and will likely need to reestablish care and may have to start from square 1.  Patient is aware of this.  - lidocaine (XYLOCAINE) 2 % 15 mL, alum & mag hydroxide-simethicone (MAALOX) 15 mL GI Cocktail  - Helicobacter pylori Antigen Stool; Future  - acetaminophen-codeine (TYLENOL #3) 300-30 MG tablet; Take 1 tablet by mouth every 8 hours as needed for severe pain  Dispense: 20 tablet; Refill: 0   - sucralfate (CARAFATE) 1 GM tablet; Take 1 tablet (1 g) by mouth 4 times daily as needed (heartburn)  Dispense: 360 tablet; Refill: 3      A professional  was used for the entire duration of the visit.    Return if symptoms worsen or fail to improve.    Bacilio Adams MD  [facility]    This chart is completed utilizing dictation software; typos and/or incorrect word substitutions may unintentionally occur.      Subjective     Katy Islas is a 31 year old female who presents to clinic today for the following health  issues accompanied:    HPI     Had strep and covid test last week, both were negative.    Acute Illness  Acute illness concerns: Vomiting, sore throat, headache, aches  Onset/Duration: Few days  Symptoms:  Fever: YES- Highest 101.8  Chills/Sweats: YES  Headache (location?): YES  Sinus Pressure: YES  Conjunctivitis:  no  Ear Pain: no  Rhinorrhea: no  Congestion: no  Sore Throat: YES  Cough: YES  Wheeze: YES  Decreased Appetite: YES  Nausea: YES  Vomiting: YES  Diarrhea: no  Dysuria/Freq.: no  Dysuria or Hematuria: no  Fatigue/Achiness: YES  Sick/Strep Exposure: no  Therapies tried and outcome: Tylenol    Patient is here today for concern of epigastric abdominal pain radiating into the chest.  Believes this is due to reflux.  She is wondering what she can do to help with this.  She has been in the ER multiple times over the last couple months for this as well as bruising on her leg.  She states she does have a hematology appointment coming up for this.  She will be moving to [place] on approximate 9/20/2021.  She states she has been diagnosed with Crohn's since approximately 2012.  Review of GI records show they recommended twice daily Protonix and adding Pepcid during her last visit in May of this year.  Their recordings of her most recent colonoscopy stated there is no signs of active disease at that time.  She states she is not currently able to take any of her mesalamine medication due to cost.    She states she was given Dilaudid which did help with her pain from the ER visit on 9/1/2021.  She otherwise manages her reflux with lidocaine as well as the Protonix.  She states Pepcid does not help.    Upper endoscopy done in August was unrevealing and did take what appeared to be esophageal biopsies which were normal.  Does not list any evidence of H. pylori though I do not see that this was tested for.    Her main concern today is the epigastric pain from her reflux in controlling this.    Review of Systems    Constitutional, HEENT, cardiovascular, pulmonary, gi and gu systems are negative, except as otherwise noted.      Objective    /86   Pulse 117   Temp 98.3  F (36.8  C) (Temporal)   Resp 20   SpO2 100%   There is no height or weight on file to calculate BMI.  Physical Exam   General: Uncomfortable appearing at times.  Appears well and in no acute distress.  Cardiovascular: Regular rate and rhythm, normal S1 and S2 without murmur. No extra heartsounds or friction rub. Radial pulses present and equal bilaterally.  Respiratory: Lungs clear to auscultation bilaterally. No wheezing or crackles. No prolonged expiration. Symmetrical chest rise.  Musculoskeletal: No gross extremity deformities. No peripheral edema. Normal muscle bulk.   GI/Rectal: Soft, non-tender abdomen. No hepatosplenomegaly. Normal active bowel sounds.     Labs: Pending

## 2021-09-09 NOTE — TELEPHONE ENCOUNTER
Pt called with  ID number [id number]. Pt is reporting she is not feeling good, and she has pneminia and was on antibiotic. Pt reported she stops taking the antibiotics this morning due to making her really sick and vomiting after taking a dose this morning.. Pt reported she had diarrhea 10 times today and vomited 3 times. Pt reported she has a littel bit of shortness of breath and her chest and throat hurts/ sore rating her pain level 6/10 constant. Pt stated her chest pain radiated to her upper back. Pt reported she is very week and no energy. Pt reported she has headache and  it hurts to swallow. Pt stated her chest is bruising and left leg has  horrible bruising.      Pt refused triage and was asked if she can go to the ER with her symptoms and she refused stating I been there many times and requesting an appointment in the morning. Pt was transferred to scheduling.         MAUREEN [facility] Nurse Advisors     .

## 2021-09-09 NOTE — UTILIZATION REVIEW
Admission Status; Secondary Review Determination    No action to be taken. Please contact me on my Email : [email or web address] if you have any questions.    As part of the Only Utilization review plan, a self-audit is done on Medicare inpatient admission with less than 2 midnights stay. The 2014 IPPS Final Rule allows outpatient billing in the event that a hospital determines that an inpatient admission was not medically necessary under utilization review process.     (x) Outpatient status would be Appropriate- Short Stay- Post discharge review.    RATIONALE FOR DETERMINATION  Patient is a 30 y/o woman who has chronic back pain, Crohn's disease and bipolar disorder. Patient initially presented with fever and bilateral leg weakness and numbness. As patient had no other overt symptoms of infection, plan was to obtain blood cultures, obtain evaluation by Infectious Disease and monitor for more overt signs and symptoms of infection. Patient left against medical advice before being seen by Infectious Disease or further workup.        Patient was admitted and discharge after one night stay. Record was sent by  for a PA review. Based on the  severity of illness, intensity of service provided, expected LOS and risk for adverse outcome make the care appropriate for further outpatient/observation; however, doesn't meet criteria for hospital inpatient admission.       The information on this document is developed by the utilization review team in order for the business office to ensure compliance.  This only denotes the appropriateness of proper admission status and does not reflect the quality of care rendered.       The definitions of Inpatient Status and Observation Status used in making the determination above are those provided in the CMS Coverage Manual, Chapter 1 and Chapter 6, section 70.4.     Please cont me if you want to discuss further about this admission episode.      Shawn Flores MD, FACP, MARIA DEL CARMEN  Medical  Director - Utilization management  Staff Hospitalist  [facility]    Pager: [phone]

## 2021-09-09 NOTE — TELEPHONE ENCOUNTER
I sent a message yesterday regarding this (Refill encounter from 9/7/21). I will not fill Dilaudid without an appointment - I need to know more information like how she is taking this and when her appointment with the pain clinic is.     Zach Rosario PA-C  [facility]

## 2021-09-09 NOTE — TELEPHONE ENCOUNTER
Too many encounters on this. See other encounters.    Zach Tomlinson-LOVE Moss  [facility]

## 2021-09-10 ENCOUNTER — TRANSFERRED RECORDS (OUTPATIENT)
Dept: HEALTH INFORMATION MANAGEMENT | Facility: CLINIC | Age: 31
End: 2021-09-10

## 2021-09-10 ENCOUNTER — OFFICE VISIT (OUTPATIENT)
Dept: FAMILY MEDICINE | Facility: CLINIC | Age: 31
End: 2021-09-10
Payer: MEDICARE

## 2021-09-10 ENCOUNTER — MYC MEDICAL ADVICE (OUTPATIENT)
Dept: GENERAL RADIOLOGY | Facility: CLINIC | Age: 31
End: 2021-09-10

## 2021-09-10 ENCOUNTER — NURSE TRIAGE (OUTPATIENT)
Dept: NURSING | Facility: CLINIC | Age: 31
End: 2021-09-10

## 2021-09-10 VITALS
OXYGEN SATURATION: 100 % | RESPIRATION RATE: 20 BRPM | HEART RATE: 117 BPM | DIASTOLIC BLOOD PRESSURE: 86 MMHG | TEMPERATURE: 98.3 F | SYSTOLIC BLOOD PRESSURE: 108 MMHG

## 2021-09-10 DIAGNOSIS — K21.9 GASTROESOPHAGEAL REFLUX DISEASE, UNSPECIFIED WHETHER ESOPHAGITIS PRESENT: Primary | ICD-10-CM

## 2021-09-10 LAB
BACTERIA BLD CULT: NO GROWTH
BACTERIA BLD CULT: NO GROWTH
CREATININE (EXTERNAL): 0.86 MG/DL (ref 0.57–1.11)
GFR ESTIMATED (EXTERNAL): >60 ML/MIN/1.73M(2)
GLUCOSE (EXTERNAL): 113 MG/DL (ref 70–105)
POTASSIUM (EXTERNAL): 3.5 MMOL/L (ref 3.5–5.1)

## 2021-09-10 PROCEDURE — 99213 OFFICE O/P EST LOW 20 MIN: CPT | Performed by: FAMILY MEDICINE

## 2021-09-10 RX ORDER — SUCRALFATE 1 G/1
1 TABLET ORAL 4 TIMES DAILY PRN
Qty: 360 TABLET | Refills: 3 | Status: SHIPPED | OUTPATIENT
Start: 2021-09-10

## 2021-09-10 NOTE — TELEPHONE ENCOUNTER
Feeling very crappy, Has pneumonia   -symptoms started last week, diagnosed with pneumonia a couple days ago  *Had COVID19 in January     Coughing, productive  -rates 5/10   -green mucus  -mostly just not comfortable   -Worse than earlier  -has a neb and inhaler that is helping very little   -states she is having difficulty breathing    Vomiting and can't keep abx in stomach   -cefdinir and z-adriana   -has been on for 3 days  -yellow  -has not eaten   -drinking sprite  -has been drinking Keifir  *later states that she did try to eat with it, a sandwich and a banana     Throat hurts all the way down especially when drinking   -rates pain 6/10  Feels like throat is swollen   Feels hot, like it is on fire    Headache   -rates 6-7/10    Can't sleep   -when lays on side, which is the only comfortable position, she feels like she is having difficulty breathing   -feels like there is fluid in lungs.     Temp is 100.8    Stiff and sore neck, cannot touch chin to chest.     Triaged to a disposition of Go to ED. Patient is agreeable.     COVID 19 Nurse Triage Plan/Patient Instructions    Please be aware that novel coronavirus (COVID-19) may be circulating in the community. If you develop symptoms such as fever, cough, or SOB or if you have concerns about the presence of another infection including coronavirus (COVID-19), please contact your health care provider or visit [email or web address].     Disposition/Instructions    ED Visit recommended. Follow protocol based instructions.     Bring Your Own Device:  Please also bring your smart device(s) (smart phones, tablets, laptops) and their charging cables for your personal use and to communicate with your care team during your visit.    Thank you for taking steps to prevent the spread of this virus.  o Limit your contact with others.  o Wear a simple mask to cover your cough.  o Wash your hands well and often.    Resources    [facility]: About COVID-19:  [email or web address]    CDC: What to Do If You're Sick: www.cdc.gov/coronavirus/2019-ncov/about/steps-when-sick.html    CDC: Ending Home Isolation: www.cdc.gov/coronavirus/2019-ncov/hcp/disposition-in-home-patients.html     CDC: Caring for Someone: www.cdc.gov/coronavirus/2019-ncov/if-you-are-sick/care-for-someone.html     [facility]: Interim Guidance for Hospital Discharge to Home: www.health.[facility].mn./diseases/coronavirus/hcp/hospdischarge.pdf    [facility] clinical trials (COVID-19 research studies): [email or web address]     Below are the COVID-19 hotlines at the [place] Department of Health ([facility]). Interpreters are available.   o For health questions: Call 548-085-5676 or 1-185.440.4538 (7 a.m. to 7 p.m.)  o For questions about schools and childcare: Call 835-577-3054 or 1-516.742.6119 (7 a.m. to 7 p.m.)     Reason for Disposition    Difficulty breathing    [1] Recent medical visit within 24 hours AND [2] symptoms worse (Exception: fever worse)    Additional Information    Negative: Severe difficulty breathing (e.g., struggling for each breath, speaks in single words)    Negative: Bluish (or gray) lips or face now    Negative: [1] Difficulty breathing AND [2] exposure to flames, smoke, or fumes    Negative: [1] Stridor AND [2] difficulty breathing    Negative: Sounds like a life-threatening emergency to the triager    Negative: [1] Previous asthma attacks AND [2] this feels like asthma attack    Negative: Dry (non-productive) cough (i.e., no sputum or minimal clear sputum)    Negative: Chest pain  (Exception: MILD central chest pain, present only when coughing)    Negative: Severe difficulty breathing (e.g., struggling for each breath, speaks in single words)    Negative: Bluish (or gray) lips or face now    Negative: Difficult to awaken or acting confused (e.g., disoriented, slurred speech)    Negative: Stridor    Negative: Slow, shallow and weak breathing     Negative: Sounds like a life-threatening emergency to the triager    Negative: Recently discharged from hospital with diagnosis of pneumonia    Negative: New onset or worsening chest pain    Negative: [1] Diabetes mellitus or weak immune system (e.g., HIV positive, cancer chemo, splenectomy, organ transplant, chronic steroids) AND [2] new onset of fever > 100.0 F (37.8 C)    Negative: MODERATE difficulty breathing (e.g., speaks in phrases, SOB even at rest, pulse 100-120)    Negative: Fever > 104 F (40 C)    Negative: [1] Taking antibiotic > 24 hours for pneumonia AND [2] fever > 103 F (39.4 C)    Negative: [1] Coughed up blood AND [2] large amount or blood clots (Exception: blood-tinged sputum)    Negative: Patient sounds very sick or weak to the triager    Negative: [1] Taking antibiotic > 24 hours for pneumonia AND [2] breathing is worse    Protocols used: COUGH - ACUTE [id number]-S-MR, PNEUMONIA ON ANTIBIOTIC FOLLOW-UP CALL-A-    Ines Villalpando RN on 9/10/2021 at 1:34 AM

## 2021-09-10 NOTE — TELEPHONE ENCOUNTER
Patient was seen today. Should keep scheduled appointment with me.    Zach Rosario PA-C  [facility]

## 2021-09-11 ENCOUNTER — MYC MEDICAL ADVICE (OUTPATIENT)
Dept: FAMILY MEDICINE | Facility: OTHER | Age: 31
End: 2021-09-11

## 2021-09-13 ENCOUNTER — TRANSFERRED RECORDS (OUTPATIENT)
Dept: HEALTH INFORMATION MANAGEMENT | Facility: CLINIC | Age: 31
End: 2021-09-13

## 2021-09-13 NOTE — TELEPHONE ENCOUNTER
Patient is wondering if she can have her appointment on 9/20/2021 to include getting the lump in her breast checked.    She is feeling better on the sucralfate. She is having some diarrhea and would like to know if she can take immodium with this medication.    She is also overdue for a physical.    Marie Nichole RN on 9/13/2021 at 12:04 PM

## 2021-09-15 ENCOUNTER — TRANSFERRED RECORDS (OUTPATIENT)
Dept: HEALTH INFORMATION MANAGEMENT | Facility: CLINIC | Age: 31
End: 2021-09-15

## 2021-09-17 ENCOUNTER — TELEPHONE (OUTPATIENT)
Dept: FAMILY MEDICINE | Facility: OTHER | Age: 31
End: 2021-09-17

## 2021-09-17 NOTE — TELEPHONE ENCOUNTER
Jose   I my charted Katy to tell her we need to reschedule her appointment on 9/20/21 and told her we would have you reach out to her to reschedule.      Thank you

## 2021-09-18 ENCOUNTER — TRANSFERRED RECORDS (OUTPATIENT)
Dept: HEALTH INFORMATION MANAGEMENT | Facility: CLINIC | Age: 31
End: 2021-09-18

## 2021-09-18 LAB
CREATININE (EXTERNAL): 0.92 MG/DL (ref 0.57–1.11)
GFR ESTIMATED (EXTERNAL): >60 ML/MIN/1.73M(2)
GLUCOSE (EXTERNAL): 89 MG/DL (ref 70–105)
POTASSIUM (EXTERNAL): 4.1 MMOL/L (ref 3.5–5.1)

## 2021-09-20 ENCOUNTER — OFFICE VISIT (OUTPATIENT)
Dept: FAMILY MEDICINE | Facility: OTHER | Age: 31
End: 2021-09-20
Payer: MEDICARE

## 2021-09-20 VITALS
SYSTOLIC BLOOD PRESSURE: 126 MMHG | DIASTOLIC BLOOD PRESSURE: 68 MMHG | HEART RATE: 105 BPM | TEMPERATURE: 97.6 F | BODY MASS INDEX: 32.91 KG/M2 | OXYGEN SATURATION: 99 % | WEIGHT: 180 LBS | RESPIRATION RATE: 20 BRPM

## 2021-09-20 DIAGNOSIS — R05.9 COUGH: Primary | ICD-10-CM

## 2021-09-20 DIAGNOSIS — R53.83 MALAISE AND FATIGUE: ICD-10-CM

## 2021-09-20 DIAGNOSIS — R53.81 MALAISE AND FATIGUE: ICD-10-CM

## 2021-09-20 DIAGNOSIS — M79.10 MUSCULAR ACHES: ICD-10-CM

## 2021-09-20 DIAGNOSIS — R52 GENERALIZED BODY ACHES: ICD-10-CM

## 2021-09-20 DIAGNOSIS — N89.8 VAGINAL DISCHARGE: ICD-10-CM

## 2021-09-20 LAB
ALBUMIN UR-MCNC: 30 MG/DL
APPEARANCE UR: CLEAR
BACTERIA #/AREA URNS HPF: ABNORMAL /HPF
BILIRUB UR QL STRIP: ABNORMAL
CLUE CELLS: ABNORMAL
COLOR UR AUTO: YELLOW
CRP SERPL-MCNC: <2.9 MG/L (ref 0–8)
GLUCOSE UR STRIP-MCNC: NEGATIVE MG/DL
HGB UR QL STRIP: NEGATIVE
KETONES UR STRIP-MCNC: 15 MG/DL
LEUKOCYTE ESTERASE UR QL STRIP: NEGATIVE
MONOCYTES NFR BLD AUTO: NEGATIVE %
MUCOUS THREADS #/AREA URNS LPF: PRESENT /LPF
NITRATE UR QL: NEGATIVE
PH UR STRIP: 5.5 [PH] (ref 5–7)
RBC #/AREA URNS AUTO: ABNORMAL /HPF
SP GR UR STRIP: 1.02 (ref 1–1.03)
TRICHOMONAS, WET PREP: ABNORMAL
UROBILINOGEN UR STRIP-ACNC: 0.2 E.U./DL
WBC #/AREA URNS AUTO: ABNORMAL /HPF
WBC'S/HIGH POWER FIELD, WET PREP: ABNORMAL
YEAST, WET PREP: ABNORMAL

## 2021-09-20 PROCEDURE — 86666 EHRLICHIA ANTIBODY: CPT | Mod: 90 | Performed by: PHYSICIAN ASSISTANT

## 2021-09-20 PROCEDURE — 86618 LYME DISEASE ANTIBODY: CPT | Performed by: PHYSICIAN ASSISTANT

## 2021-09-20 PROCEDURE — 86140 C-REACTIVE PROTEIN: CPT | Performed by: PHYSICIAN ASSISTANT

## 2021-09-20 PROCEDURE — T1013 SIGN LANG/ORAL INTERPRETER: HCPCS | Mod: U3

## 2021-09-20 PROCEDURE — 86308 HETEROPHILE ANTIBODY SCREEN: CPT | Performed by: PHYSICIAN ASSISTANT

## 2021-09-20 PROCEDURE — 87210 SMEAR WET MOUNT SALINE/INK: CPT | Performed by: PHYSICIAN ASSISTANT

## 2021-09-20 PROCEDURE — 36415 COLL VENOUS BLD VENIPUNCTURE: CPT | Performed by: PHYSICIAN ASSISTANT

## 2021-09-20 PROCEDURE — 82306 VITAMIN D 25 HYDROXY: CPT | Performed by: PHYSICIAN ASSISTANT

## 2021-09-20 PROCEDURE — 99214 OFFICE O/P EST MOD 30 MIN: CPT | Performed by: PHYSICIAN ASSISTANT

## 2021-09-20 PROCEDURE — 81001 URINALYSIS AUTO W/SCOPE: CPT | Performed by: PHYSICIAN ASSISTANT

## 2021-09-20 ASSESSMENT — PAIN SCALES - GENERAL: PAINLEVEL: MODERATE PAIN (5)

## 2021-09-20 NOTE — PROGRESS NOTES
Assessment & Plan     Cough  Malaise and fatigue  Generalized body aches  Muscular aches  Vaginal discharge  -Patient presents to clinic today with .  She describes global body aches and pains of uncertain etiology.  She has been seen in the emergency room for this without much for answers.  We did discover that she may have had a tick bite of the right anterior lower leg.  On day 3 she has not been checked for vitamin D deficiency and has not been checked for mono.  We will try to check for Lyme disease and anaplasmosis as well as to add in a wet prep and urinalysis for further evaluation and treatment options.  Advised that narcotics are not indicated for what she is going through right now and any refills of those need to go through her primary care provider.  - UA Macro with Reflex to Micro and Culture - lab collect; Future  - Wet prep - lab collect; Future  - Mononucleosis screen; Future  - Vitamin D Deficiency; Future  - Lyme Disease Dede with reflex to WB Serum; Future  - Anaplasma phagocytoph antibody IgG IgM; Future  - CRP, inflammation; Future    Body mass index (BMI) 32.0-32.9, adult   - Vitamin D Deficiency; Future      Work on weight loss  Regular exercise  No follow-ups on file.    Johnathan Taylor PA-C  [facility] TERRY Burns is a 31 year old who presents for the following health issues     HPI       Hospital Follow-up Visit:    Hospital/Nursing Home/ Rehab Facility: [facility]  Date of Admission: 09/18/21  Date of Discharge: 09/18/21  Reason(s) for Admission: cough      Was your hospitalization related to COVID-19? No   Problems taking medications regularly:  None  Medication changes since discharge: None  Problems adhering to non-medication therapy:  None    Summary of hospitalization:  [facility] discharge summary reviewed  Diagnostic Tests/Treatments reviewed.  Follow up needed: Related labs today.  Other  Healthcare Providers Involved in Patient s Care:         None  Update since discharge: fluctuating course. Post Discharge Medication Reconciliation: discharge medications reconciled, continue medications without change.  Plan of care communicated with patient            Review of Systems   Constitutional, HEENT, cardiovascular, pulmonary, GI, , musculoskeletal, neuro, skin, endocrine and psych systems are negative, except as otherwise noted.      Objective    /68   Pulse 105   Temp 97.6  F (36.4  C) (Temporal)   Resp 20   Wt 81.6 kg (180 lb)   SpO2 99%   BMI 32.91 kg/m    Body mass index is 32.91 kg/m .  Physical Exam   GENERAL: healthy, alert and no distress  NECK: no adenopathy, no asymmetry, masses, or scars and thyroid normal to palpation  RESP: lungs clear to auscultation - no rales, rhonchi or wheezes  CV: regular rate and rhythm, normal S1 S2, no S3 or S4, no murmur, click or rub, no peripheral edema and peripheral pulses strong  ABDOMEN: soft, nontender, no hepatosplenomegaly, no masses and bowel sounds normal  MS: no gross musculoskeletal defects noted, no edema    Results for orders placed or performed in visit on 09/20/21 (from the past 24 hour(s))   Wet prep - lab collect    Specimen: Vagina; Swab   Result Value Ref Range    Trichomonas Absent Absent    Yeast Absent Absent    Clue Cells Absent Absent    WBCs/high power field 1+ (A) None     *Note: Due to a large number of results and/or encounters for the requested time period, some results have not been displayed. A complete set of results can be found in Results Review.

## 2021-09-21 LAB
B BURGDOR IGG+IGM SER QL: 0.07
DEPRECATED CALCIDIOL+CALCIFEROL SERPL-MC: 32 UG/L (ref 20–75)

## 2021-09-21 NOTE — TELEPHONE ENCOUNTER
Patient was seen by Johnathan yesterday. No further follow up needed.   EDYTA العليN, RN, PHN  [facility]  September 21, 2021

## 2021-09-23 ENCOUNTER — VIRTUAL VISIT (OUTPATIENT)
Dept: FAMILY MEDICINE | Facility: OTHER | Age: 31
End: 2021-09-23
Payer: MEDICARE

## 2021-09-23 DIAGNOSIS — J40 BRONCHITIS: Primary | ICD-10-CM

## 2021-09-23 PROCEDURE — 99441 PR PHYSICIAN TELEPHONE EVALUATION 5-10 MIN: CPT | Mod: 95 | Performed by: FAMILY MEDICINE

## 2021-09-23 RX ORDER — AZITHROMYCIN 250 MG/1
TABLET, FILM COATED ORAL
Qty: 6 TABLET | Refills: 0 | Status: SHIPPED | OUTPATIENT
Start: 2021-09-23 | End: 2021-09-28

## 2021-09-23 ASSESSMENT — PAIN SCALES - GENERAL: PAINLEVEL: MODERATE PAIN (4)

## 2021-09-23 NOTE — PROGRESS NOTES
Katy is a 31 year old who is being evaluated via a billable telephone visit.      What phone number would you like to be contacted at? [phone]  How would you like to obtain your AVS? MyChart    Assessment & Plan     Bronchitis  Discussed a trial of azithromycin  Declined prescription of controlled substances  Discussed home care  Reportable signs and symptoms discussed  RTC if symptoms persist or fail to improve    - azithromycin (ZITHROMAX) 250 MG tablet; Take 2 tablets (500 mg) by mouth daily for 1 day, THEN 1 tablet (250 mg) daily for 4 days.    Jessica Rios MD  [facility]    Alex Burns is a 31 year old who presents for the following health issues  accompanied by her :    HPI     Saw Johnathan Santiago on 9/20 and was negative for mono. She still doesn't feel good. She is requesting an antibiotic.     Acute Illness  Acute illness concerns: headache, sore throat, chest congestion, dizziness,   Onset/Duration: for about 3 weeks.   Symptoms: feels like her heart is beating really fast.   Fever: YES- last night again it was 101.2 orally. Took tylenol and it went down.   Chills/Sweats: YES- some, sometimes she feels cold and then she will feel hot.   Headache (location?): YES  Sinus Pressure: YES-her face.   Conjunctivitis:  YES- red eyes  Ear Pain: YES: left  Rhinorrhea: no  Congestion: YES- feels like she needs to cough up mucous and it is really thick.   Sore Throat: YES- along with red, pus on her tonsils and has noticed this for a few days.   Cough: YES-improving over time. Mucous is thick and white.   Wheeze: no, denies shortness of breath.   Decreased Appetite: YES- and has lost some weight as well about 7 pounds. Denies loss of sense of taste and smell.   Nausea: YES  Vomiting: YES- maybe 2-3 times this week. Feels like she needs to throw up, gags, nothing comes out. Hasn't been eating very much.   Diarrhea: no-not for two days.   Dysuria/Freq.: no  Dysuria or  Hematuria: no  Fatigue/Achiness: YES  Sick/Strep Exposure: not that she is aware of, she doesn't really go out much.   Therapies tried and outcome: zofran, tylenol, bentyl - these helped.         Review of Systems   Constitutional, HEENT, cardiovascular, pulmonary, GI, , musculoskeletal, neuro, skin, endocrine and psych systems are negative, except as otherwise noted.      Objective    Vitals - Patient Reported  Pain Score: Moderate Pain (4)  Pain Loc: Head      Vitals:  No vitals were obtained today due to virtual visit.    Physical Exam   healthy, alert and no distress  PSYCH: Alert and oriented times 3; coherent speech, normal   rate and volume, able to articulate logical thoughts, able   to abstract reason, no tangential thoughts, no hallucinations   or delusions  Her affect is normal  RESP: No cough, no audible wheezing, able to talk in full sentences  Remainder of exam unable to be completed due to telephone visits      Phone call duration: 9 minutes

## 2021-09-25 LAB
A PHAGOCYTOPH IGG TITR SER IF: NORMAL {TITER}
A PHAGOCYTOPH IGM TITR SER IF: NORMAL {TITER}

## 2021-09-29 ENCOUNTER — NURSE TRIAGE (OUTPATIENT)
Dept: NURSING | Facility: CLINIC | Age: 31
End: 2021-09-29

## 2021-09-30 ENCOUNTER — TELEPHONE (OUTPATIENT)
Dept: FAMILY MEDICINE | Facility: CLINIC | Age: 31
End: 2021-09-30

## 2021-09-30 ENCOUNTER — OFFICE VISIT (OUTPATIENT)
Dept: FAMILY MEDICINE | Facility: CLINIC | Age: 31
End: 2021-09-30
Payer: MEDICARE

## 2021-09-30 ENCOUNTER — ANCILLARY PROCEDURE (OUTPATIENT)
Dept: GENERAL RADIOLOGY | Facility: CLINIC | Age: 31
End: 2021-09-30
Attending: FAMILY MEDICINE
Payer: MEDICARE

## 2021-09-30 VITALS
SYSTOLIC BLOOD PRESSURE: 132 MMHG | BODY MASS INDEX: 33.1 KG/M2 | WEIGHT: 181 LBS | DIASTOLIC BLOOD PRESSURE: 94 MMHG | TEMPERATURE: 97.3 F | OXYGEN SATURATION: 97 % | HEART RATE: 111 BPM

## 2021-09-30 DIAGNOSIS — R10.84 ABDOMINAL PAIN, GENERALIZED: Primary | ICD-10-CM

## 2021-09-30 DIAGNOSIS — B37.31 CANDIDIASIS OF VAGINA: Primary | ICD-10-CM

## 2021-09-30 DIAGNOSIS — M54.50 ACUTE BILATERAL LOW BACK PAIN WITHOUT SCIATICA: ICD-10-CM

## 2021-09-30 DIAGNOSIS — R10.84 ABDOMINAL PAIN, GENERALIZED: ICD-10-CM

## 2021-09-30 DIAGNOSIS — K21.9 GASTROESOPHAGEAL REFLUX DISEASE, UNSPECIFIED WHETHER ESOPHAGITIS PRESENT: ICD-10-CM

## 2021-09-30 LAB
ALBUMIN SERPL-MCNC: 4.5 G/DL (ref 3.4–5)
ALBUMIN UR-MCNC: NEGATIVE MG/DL
ALP SERPL-CCNC: 90 U/L (ref 40–150)
ALT SERPL W P-5'-P-CCNC: 90 U/L (ref 0–50)
ANION GAP SERPL CALCULATED.3IONS-SCNC: 6 MMOL/L (ref 3–14)
APPEARANCE UR: CLEAR
AST SERPL W P-5'-P-CCNC: 23 U/L (ref 0–45)
BILIRUB SERPL-MCNC: 0.3 MG/DL (ref 0.2–1.3)
BILIRUB UR QL STRIP: NEGATIVE
BUN SERPL-MCNC: 15 MG/DL (ref 7–30)
CALCIUM SERPL-MCNC: 9.7 MG/DL (ref 8.5–10.1)
CHLORIDE BLD-SCNC: 106 MMOL/L (ref 94–109)
CLUE CELLS: ABNORMAL
CO2 SERPL-SCNC: 25 MMOL/L (ref 20–32)
COLOR UR AUTO: YELLOW
CREAT SERPL-MCNC: 0.83 MG/DL (ref 0.52–1.04)
CRP SERPL-MCNC: <2.9 MG/L (ref 0–8)
ERYTHROCYTE [DISTWIDTH] IN BLOOD BY AUTOMATED COUNT: 11.4 % (ref 10–15)
ERYTHROCYTE [SEDIMENTATION RATE] IN BLOOD BY WESTERGREN METHOD: 9 MM/HR (ref 0–20)
GFR SERPL CREATININE-BSD FRML MDRD: >90 ML/MIN/1.73M2
GLUCOSE BLD-MCNC: 82 MG/DL (ref 70–99)
GLUCOSE UR STRIP-MCNC: NEGATIVE MG/DL
HCT VFR BLD AUTO: 39.5 % (ref 35–47)
HGB BLD-MCNC: 13.7 G/DL (ref 11.7–15.7)
HGB UR QL STRIP: NEGATIVE
KETONES UR STRIP-MCNC: NEGATIVE MG/DL
LEUKOCYTE ESTERASE UR QL STRIP: NEGATIVE
LIPASE SERPL-CCNC: 101 U/L (ref 73–393)
MCH RBC QN AUTO: 32.8 PG (ref 26.5–33)
MCHC RBC AUTO-ENTMCNC: 34.7 G/DL (ref 31.5–36.5)
MCV RBC AUTO: 95 FL (ref 78–100)
NITRATE UR QL: NEGATIVE
PH UR STRIP: 5.5 [PH] (ref 5–7)
PLATELET # BLD AUTO: 244 10E3/UL (ref 150–450)
POTASSIUM BLD-SCNC: 4.4 MMOL/L (ref 3.4–5.3)
PROT SERPL-MCNC: 7.6 G/DL (ref 6.8–8.8)
RBC # BLD AUTO: 4.18 10E6/UL (ref 3.8–5.2)
RBC #/AREA URNS AUTO: ABNORMAL /HPF
SODIUM SERPL-SCNC: 137 MMOL/L (ref 133–144)
SP GR UR STRIP: 1.02 (ref 1–1.03)
SQUAMOUS #/AREA URNS AUTO: ABNORMAL /LPF
TRICHOMONAS, WET PREP: ABNORMAL
UROBILINOGEN UR STRIP-ACNC: 0.2 E.U./DL
WBC # BLD AUTO: 4.4 10E3/UL (ref 4–11)
WBC #/AREA URNS AUTO: ABNORMAL /HPF
WBC'S/HIGH POWER FIELD, WET PREP: ABNORMAL
YEAST, WET PREP: PRESENT

## 2021-09-30 PROCEDURE — 86140 C-REACTIVE PROTEIN: CPT | Performed by: FAMILY MEDICINE

## 2021-09-30 PROCEDURE — 83690 ASSAY OF LIPASE: CPT | Performed by: FAMILY MEDICINE

## 2021-09-30 PROCEDURE — 72100 X-RAY EXAM L-S SPINE 2/3 VWS: CPT | Performed by: RADIOLOGY

## 2021-09-30 PROCEDURE — 80053 COMPREHEN METABOLIC PANEL: CPT | Performed by: FAMILY MEDICINE

## 2021-09-30 PROCEDURE — 99214 OFFICE O/P EST MOD 30 MIN: CPT | Performed by: FAMILY MEDICINE

## 2021-09-30 PROCEDURE — 87210 SMEAR WET MOUNT SALINE/INK: CPT | Performed by: FAMILY MEDICINE

## 2021-09-30 PROCEDURE — 36415 COLL VENOUS BLD VENIPUNCTURE: CPT | Performed by: FAMILY MEDICINE

## 2021-09-30 PROCEDURE — 85652 RBC SED RATE AUTOMATED: CPT | Performed by: FAMILY MEDICINE

## 2021-09-30 PROCEDURE — 85027 COMPLETE CBC AUTOMATED: CPT | Performed by: FAMILY MEDICINE

## 2021-09-30 PROCEDURE — 81001 URINALYSIS AUTO W/SCOPE: CPT | Performed by: FAMILY MEDICINE

## 2021-09-30 RX ORDER — FLUCONAZOLE 150 MG/1
150 TABLET ORAL ONCE
Qty: 1 TABLET | Refills: 0 | Status: SHIPPED | OUTPATIENT
Start: 2021-09-30 | End: 2021-09-30

## 2021-09-30 ASSESSMENT — PAIN SCALES - GENERAL: PAINLEVEL: SEVERE PAIN (7)

## 2021-09-30 NOTE — TELEPHONE ENCOUNTER
Reason for Call: Request for an order     Order or referral being requested: Pt has an appt at [facility] today, she is wondering she can come earlier and get a UA done before her appt, she is not feeling good and is having a lot of pain.     Date needed: as soon as possible    Has the patient been seen by the PCP for this problem? NO    Additional comments: no    Phone number Patient can be reached at:  Home number on file [phone] (home)    Best Time:  anytime    Can we leave a detailed message on this number?  YES    Call taken on 9/30/2021 at 2:13 PM by Evita De La Cruz

## 2021-09-30 NOTE — PROGRESS NOTES
Assessment & Plan   1. Abdominal pain, generalized: Patient with sharp back pain radiating to her epigastrium.  States this is different than her normal reflux.  Tylenol 3 given for pain at this time.  UA unremarkable.  X-ray unremarkable.  Significant left upper quadrant and epigastric pain.  States she does not drink alcohol.  Will check liver enzymes and lipase as well as inflammatory markers.  Wet prep pending.  Discussed red flag symptoms when to go emergency department.  Call with results when available.  Consider CT of the abdomen looking for stone disease or infectious etiology pending lab results and ongoing symptoms if not improving.  - UA Macro with Reflex to Micro and Culture - lab collect; Future  - UA with Microscopic reflex to Culture - lab collect  - Urine Microscopic  - XR Lumbar Spine 2/3 Views; Future  - CBC with platelets; Future  - Comprehensive metabolic panel (BMP + Alb, Alk Phos, ALT, AST, Total. Bili, TP); Future  - Lipase; Future  - ESR: Erythrocyte sedimentation rate; Future  - CRP, inflammation; Future  - Wet prep - lab collect  - CBC with platelets  - Comprehensive metabolic panel (BMP + Alb, Alk Phos, ALT, AST, Total. Bili, TP)  - Lipase  - ESR: Erythrocyte sedimentation rate  - CRP, inflammation    2. Acute bilateral low back pain without sciatica: He does not have a definitive etiology given exam with both musculoskeletal pain to palpation as well as abdominal exam.  X-ray shows no significant changes in prior fusion.  I offered Tylenol 3 as below.  Work-up as above.  - XR Lumbar Spine 2/3 Views; Future  - CBC with platelets; Future    3. Gastroesophageal reflux disease, unspecified whether esophagitis present: Patient previously treated for gastritis.  Multiple allergies.  Has tried Carafate, lidocaine, PPI, Pepcid without significant relief.  Still awaiting H. pylori stool test.  Refilled Tylenol 3 for her current acute pain.   reviewed.  - acetaminophen-codeine (TYLENOL #3)  300-30 MG tablet; Take 1 tablet by mouth every 8 hours as needed for severe pain  Dispense: 20 tablet; Refill: 0  - Helicobacter pylori Antigen Stool    Bacilio Adams MD  [facility]    This chart is completed utilizing dictation software; typos and/or incorrect word substitutions may unintentionally occur.      Alex Burns is a 31 year old who presents for the following health issues :    HPI     Patient is having really and abdominal pain/back- been going on for about 4 days. progressively getting worse. Has noticed a low grade fever but no temperature measured at home. States that she it goes from her back all they way through her epigastrium.    99.8 before leaving to come to clinic. Took tylenol    Has had some nausea. Zofran is taking the edge away on this.  This is different than her previous gastritis/reflux pain and is not improved with her viscous lidocaine.  She is wondering if this might be a UTI and left a urine.  She has history of small kidney stones in the past.  She denies any dysuria, pustular discharge, hematuria.  Stools have been normal.      She has had her gallbladder out.  She has not drank in over a year.    Eating/drinking makes it worse. She has not eaten at all today.   Twisting hurts more- feels good to lay down. This will help for a little while.   Warm baths with lavender help alittle. Heating pad also helps some.    Pain is constant. But goes up and down with intensity. Describes it as a burning feeling.     Fall 3 weeks ago. Back forward.    Review of Systems   Constitutional, HEENT, lymph, Derm, MSK, cardiovascular, pulmonary, gi and gu systems are negative, except as otherwise noted.      Objective    BP (!) 132/94   Pulse 111   Temp 97.3  F (36.3  C) (Temporal)   Wt 82.1 kg (181 lb)   LMP  (LMP Unknown)   SpO2 97%   BMI 33.10 kg/m    Body mass index is 33.1 kg/m .  Physical Exam   General: Appears well and in no acute  distress.  Cardiovascular: Regular rate and rhythm, normal S1 and S2 without murmur. No extra heartsounds or friction rub. Radial pulses present and equal bilaterally.  Respiratory:  Lungs clear to auscultation bilaterally. No wheezing or crackles. No prolonged expiration. Symmetrical chest rise.  Musculoskeletal: Pain to palpation over prior surgical scar, inferior to the surgical scar, and paraspinal muscles around the scar.  No gross extremity deformities. No peripheral edema. Normal muscle bulk.  Straight leg raise negative bilaterally.  Patient notes increased pain with active range of motion of bilateral hip flexion.  GI/Rectal: Abdominal tenderness over epigastrium, left upper quadrant.  Otherwise soft, non-distended abdomen. No hepatosplenomegaly. Normal active bowel sounds.      Labs: Pending    Lumbar x-ray showing prior fusion.  Bowel gas pattern normal.  No evidence of fracture.  Formal radiology read pending.

## 2021-09-30 NOTE — RESULT ENCOUNTER NOTE
Please inform of results if patient has not viewed in [employer].    Your do have evidence of a yeast infection on your wet prep. I have ordered an antifungal medication to be taken once. I doubt this is the cause of your pain symptoms however.    Please call the clinic with any questions you may have.     Have a great day,    Dr. Ferrer

## 2021-09-30 NOTE — RESULT ENCOUNTER NOTE
Please inform of results if patient has not viewed in [employer].    Your cell count lab results came back normal. Which is reassuring. Your other labs are pending.    Please call the clinic with any questions you may have.     Have a great day,    Dr. Ferrer

## 2021-09-30 NOTE — TELEPHONE ENCOUNTER
"\"I was seen on 9/23 for bronchitis and given azithromycin (ZITHROMAX) 250 MG tablet; Take 2 tablets (500 mg) by mouth daily for 1 day, THEN 1 tablet (250 mg) daily for 4 days.  \"My cough is much better. I am now having abdominal pain (all over and constant) and back pain, it's the flank area, mostly the right side.   It feels like it starts in my back and goes straight through to my stomach. I also vomited once today.\"  Denies fever  Denies concerns with urination  Rates abdominal pain constant and 5/10  Back pain 5/10.  Triaged and advised per protocol to be seen within 4 hrs   Pt prefers to make an appt   Transferred to scheduling   Advised to call back if needed  Patient triaged using a .  Paradise Hassan RN [facility]        Reason for Disposition    [1] MILD-MODERATE pain AND [2] constant AND [3] present > 2 hours    Additional Information    Negative: Shock suspected (e.g., cold/pale/clammy skin, too weak to stand, low BP, rapid pulse)    Negative: Difficult to awaken or acting confused (e.g., disoriented, slurred speech)    Negative: Passed out (i.e., lost consciousness, collapsed and was not responding)    Negative: Sounds like a life-threatening emergency to the triager    Negative: [1] SEVERE pain (e.g., excruciating) AND [2] present > 1 hour    Negative: [1] SEVERE pain AND [2] age > 60    Negative: [1] Vomiting AND [2] contains red blood or black (\"coffee ground\") material  (Exception: few red streaks in vomit that only happened once)    Negative: Blood in bowel movements   (Exception: blood on surface of BM with constipation)    Negative: Black or tarry bowel movements  (Exception: chronic-unchanged  black-grey bowel movements AND is taking iron pills or Pepto-bismol)    Negative: Patient sounds very sick or weak to the triager    Protocols used: ABDOMINAL PAIN - FEMALE-A-AH      "

## 2021-10-01 ENCOUNTER — TRANSFERRED RECORDS (OUTPATIENT)
Dept: HEALTH INFORMATION MANAGEMENT | Facility: CLINIC | Age: 31
End: 2021-10-01

## 2021-10-01 ENCOUNTER — MYC MEDICAL ADVICE (OUTPATIENT)
Dept: FAMILY MEDICINE | Facility: CLINIC | Age: 31
End: 2021-10-01

## 2021-10-01 ENCOUNTER — ALLIED HEALTH/NURSE VISIT (OUTPATIENT)
Dept: FAMILY MEDICINE | Facility: CLINIC | Age: 31
End: 2021-10-01
Payer: COMMERCIAL

## 2021-10-01 DIAGNOSIS — Z53.9 DIAGNOSIS FOR ++++ WALK IN CLINIC ++++: Primary | ICD-10-CM

## 2021-10-01 PROCEDURE — 87338 HPYLORI STOOL AG IA: CPT | Performed by: FAMILY MEDICINE

## 2021-10-01 NOTE — RESULT ENCOUNTER NOTE
Please inform of results if patient has not viewed in [employer].    Your lab results all came back very reassuring. If your symptoms persist or worsen into next week we can consider getting a CT scan. Please keep me updated on your symptoms, but for right now your kidney, liver, and pancrease all look good and there are no signs of infection other than the yeast infection.     I would treat this for now as previously mentioned.    Please call the clinic with any questions you may have.     Have a great day,    Dr. Ferrer

## 2021-10-01 NOTE — PROGRESS NOTES
"Patient walked into clinic with concerns of rectal bleeding that started this morning.    Patient is deaf, can read lips well. Used paper to communicate.      Patient was seen in clinic yesterday with Dr. Ferrer, who suggested CT scan if not improving.    Patient states she only had the rectal bleeding the one time this morning and it \"was a lot, about 1/4 cup\" and also had blood clots about the size of quarter.     Stomach pain last night all over, more so in LUQ. States it lasted for straight 2 hours.     Having constipation. Has Crohn's Disease so has had rectal bleeding in past but \"never this much blood before.\"     Patient states she is fatigued and weak. \"Just feel weird and not right\"    Vomited once this morning, no blood in vomit.     Patient left clinic, did not want to wait for provider huddle. Patient was advised that the ED would most likely be recommended and patient states understanding. Writing down on paper that she will most likely go to [place] ED. Will await [employer] message with recommendation form provider.     Huddled with Dr. Ferrer:    Recommended to go to the emergency room to be evaluated. With the rectal bleeding, weak, fatigue, abdominal pain, and overall not feeling right this is advised. Patient was sent [employer] message with this recommendation per patient request.     MAUREEN Jacques/[facility]    "

## 2021-10-04 ENCOUNTER — OFFICE VISIT (OUTPATIENT)
Dept: FAMILY MEDICINE | Facility: CLINIC | Age: 31
End: 2021-10-04
Payer: MEDICARE

## 2021-10-04 ENCOUNTER — MYC MEDICAL ADVICE (OUTPATIENT)
Dept: FAMILY MEDICINE | Facility: CLINIC | Age: 31
End: 2021-10-04

## 2021-10-04 VITALS
RESPIRATION RATE: 14 BRPM | TEMPERATURE: 97.6 F | HEART RATE: 101 BPM | SYSTOLIC BLOOD PRESSURE: 128 MMHG | OXYGEN SATURATION: 100 % | DIASTOLIC BLOOD PRESSURE: 84 MMHG

## 2021-10-04 DIAGNOSIS — K92.1 HEMATOCHEZIA: Primary | ICD-10-CM

## 2021-10-04 DIAGNOSIS — K50.113 CROHN'S DISEASE OF COLON WITH FISTULA (H): ICD-10-CM

## 2021-10-04 DIAGNOSIS — K21.9 GASTROESOPHAGEAL REFLUX DISEASE, UNSPECIFIED WHETHER ESOPHAGITIS PRESENT: ICD-10-CM

## 2021-10-04 LAB — H PYLORI AG STL QL IA: NEGATIVE

## 2021-10-04 PROCEDURE — 99213 OFFICE O/P EST LOW 20 MIN: CPT | Performed by: FAMILY MEDICINE

## 2021-10-04 NOTE — RESULT ENCOUNTER NOTE
Please inform of results if patient has not viewed in [employer].    Your H. Pylori stool lab results came back normal.    Please call the clinic with any questions you may have.     Have a great day,    Dr. Ferrer

## 2021-10-04 NOTE — TELEPHONE ENCOUNTER
, please advise.   Jose Finney, BSN, RN, PHN  [facility]  October 4, 2021

## 2021-10-04 NOTE — TELEPHONE ENCOUNTER
Routing to provider-    Next steps? Patient was seen in ED and was not happy.     MAUREEN Jacques/[facility]

## 2021-10-04 NOTE — TELEPHONE ENCOUNTER
Hydrocodone not appropriate. Will not be prescribed for this. Supply with colonoscopy number, I will order it for her again as well since it was for [place] previously.    Bacilio Adams MD  [facility]

## 2021-10-04 NOTE — PROGRESS NOTES
Assessment & Plan   1. Hematochezia: Fecal occult positive in urgent care.  Recommend next step with colonoscopy.  Stable exam today today.  No evidence of external hemorrhoids on exam.  Possibly due to internal hemorrhoids.  Rule out other causes.  Unlikely she will be able to obtain colonoscopy before moving to [place].  - Adult Gastro Ref - Procedure Only; Future    2. Gastroesophageal reflux disease, unspecified whether esophagitis present: 1 further prescription given for Tylenol with codeine gastritis.  Discussed narcotic pain medication otherwise is not appropriate.  Needs further evaluation and treatment with her GI specialist.  Will need to establish care in [place] when she moves.  - acetaminophen-codeine (TYLENOL #3) 300-30 MG tablet; Take 1 tablet by mouth every 8 hours as needed for severe pain  Dispense: 15 tablet; Refill: 0      Return if symptoms worsen or fail to improve.    Bacilio Adams MD  [facility]    This chart is completed utilizing dictation software; typos and/or incorrect word substitutions may unintentionally occur.      Alex Burns is a 31 year old who presents for the following health issues:    HPI     Hemorrhoids  Onset/Duration: 2 weeks- and symptoms have progessivly changed and and worsended. Went to the ER and states that she was not helped at all. Patient states that she was in so much pain and requested a CT and they refused to give her one in the ER. Went to the [facility] on 10/1/21    Patient states that she has had a fever on and off for a few weeks now- normal while rooming.     She has 5 days until she moves to [place]- she would like to get in with her colorectal surgeon before she leaves.    Description:   Radha-anal lump: YES  Pain: YES  Itching: no  Accompanying Signs & Symptoms:  Blood in stool: YES  Changes in stool pattern: YES- constipated and its really had to have a BM. Pain with BM. She is having a lot of  abdominal and back pain that keeps increasing. During rooming it was a 5.   History:   Any previous GI studies done: several in the past- had surgery 2 years ago now for the same reason. Would like another colonoscopy.   Family History of colon cancer: no- but brother has Crones.   Precipitating factors:   None  Alleviating factors:  None  Therapies tried and outcome: warm baths seem to help      Review of Systems   Constitutional, gi, lymph, derm, msk, cardiovascular, pulmonary, gi and gu systems are negative, except as otherwise noted.      Objective    /84   Pulse 101   Temp 97.6  F (36.4  C) (Temporal)   Resp 14   LMP  (LMP Unknown)   SpO2 100%   There is no height or weight on file to calculate BMI.  Physical Exam   General: Appears well and in no acute distress.  Cardiovascular: Regular rate and rhythm, normal S1 and S2 without murmur. No extra heartsounds or friction rub. Radial pulses present and equal bilaterally.  Respiratory: Lungs clear to auscultation bilaterally. No wheezing or crackles. No prolonged expiration. Symmetrical chest rise.  Musculoskeletal:  No gross extremity deformities. No peripheral edema. Normal muscle bulk.   GI/Rectal: Soft, non-tender abdomen. No hepatosplenomegaly. Normal active bowel sounds.  Slight erythema around anal area.    Labs: None

## 2021-10-05 RX ORDER — DICYCLOMINE HCL 20 MG
20 TABLET ORAL EVERY 6 HOURS
Qty: 90 TABLET | Refills: 3 | Status: SHIPPED | OUTPATIENT
Start: 2021-10-05

## 2021-10-05 NOTE — TELEPHONE ENCOUNTER
Provider please review patient's question regarding lidocaine patches. Bentyl Rx request sent to the refill pool for review.     July Mederos RN on 10/5/2021 at 10:03 AM

## 2021-10-06 ENCOUNTER — MYC MEDICAL ADVICE (OUTPATIENT)
Dept: FAMILY MEDICINE | Facility: CLINIC | Age: 31
End: 2021-10-06

## 2021-10-06 DIAGNOSIS — G89.29 CHRONIC BILATERAL LOW BACK PAIN WITH LEFT-SIDED SCIATICA: Primary | ICD-10-CM

## 2021-10-06 DIAGNOSIS — M54.42 CHRONIC BILATERAL LOW BACK PAIN WITH LEFT-SIDED SCIATICA: Primary | ICD-10-CM

## 2021-10-06 RX ORDER — CYCLOBENZAPRINE HCL 10 MG
10 TABLET ORAL 3 TIMES DAILY PRN
Qty: 45 TABLET | Refills: 0 | Status: SHIPPED | OUTPATIENT
Start: 2021-10-06 | End: 2021-12-31

## 2021-10-07 ENCOUNTER — OFFICE VISIT (OUTPATIENT)
Dept: HEMATOLOGY | Facility: CLINIC | Age: 31
End: 2021-10-07
Attending: PHYSICIAN ASSISTANT
Payer: MEDICARE

## 2021-10-07 VITALS
RESPIRATION RATE: 14 BRPM | OXYGEN SATURATION: 100 % | SYSTOLIC BLOOD PRESSURE: 134 MMHG | WEIGHT: 185.2 LBS | HEART RATE: 87 BPM | BODY MASS INDEX: 34.08 KG/M2 | HEIGHT: 62 IN | DIASTOLIC BLOOD PRESSURE: 90 MMHG | TEMPERATURE: 98.2 F

## 2021-10-07 DIAGNOSIS — R23.3 EASY BRUISING: Primary | ICD-10-CM

## 2021-10-07 DIAGNOSIS — D63.8 ANEMIA IN OTHER CHRONIC DISEASES CLASSIFIED ELSEWHERE: ICD-10-CM

## 2021-10-07 PROCEDURE — 99203 OFFICE O/P NEW LOW 30 MIN: CPT | Performed by: PHYSICIAN ASSISTANT

## 2021-10-07 PROCEDURE — T1013 SIGN LANG/ORAL INTERPRETER: HCPCS | Mod: U3

## 2021-10-07 PROCEDURE — G0463 HOSPITAL OUTPT CLINIC VISIT: HCPCS

## 2021-10-07 ASSESSMENT — MIFFLIN-ST. JEOR: SCORE: 1503.31

## 2021-10-07 ASSESSMENT — PAIN SCALES - GENERAL: PAINLEVEL: NO PAIN (0)

## 2021-10-07 NOTE — PROGRESS NOTES
[facility]  [address] [place] [address], [place] [address]  Main: [phone], Fax: [phone]    [facility] at [address] [place] [address]    Outpatient Visit Note:    Patient: Katy Islas  MRN: [id number]  : 1990  TESSA: 2021    Reason:  Easy bruising.     HPI:  Katy is a 31 year old female who has hearing impairment requiring , who also has a history of borderline personality disorder, narcotic dependency, drug seeking behavior, Crohn's disease, fibromyalgia, UTI, irritable bowel syndrome, PTSD, anxiety and depression who is referred by Aura Rosario PA-C, her primary care provider, for bruising of her legs back in Aug 2021.      is present throughout the entire clinic visit today.     Back in 2021, she apparently presented to the emergency department with bruising to her bilateral lower extremities, flank area, arms and bottom. At the time she reports that she falls often without remembering what lead up to her falls. CT abdomen was done showed no retroperitoneal bleeding.   She has taken a few pictures of her legs and had sent it via [employer] and these pictures are available in the Media tap. I have reviewed these pictures today.     She is currently on a serotonin norepinephrine reuptake inhibitor (SNRI) with Cymbalta. According to her mother today, who accompanied the patient to this clinic, Katy was also on Wellbutrin at the time as well as buspirone. Subsequently these medications were discontinued by her psychiatrist. Since then, Katy has had no issues with frequent falling and her bruising has significantly improved.     As mentioned, Katy has a history of anxiety, she has had at least 21 emergency room visits since 2021.     Her most recent labs on 2021 shows a normal CBC, WBC at 4.4; Hgb 13.7;  "Platelet count 244K. Comprehensive metabolic panel showed Alk phos at 90; AST 23; ALT 90; Creatinine 0.83. INR was normal at 1.0 on 9/7/2021; APTT on 9/7/2021 was 22.6 (normal 24.0-31s). 9/5/2021 APTT was 27s and INR of 1.09.     Bleeding history:  Katy reports no other bleeding issues. She does have hemorrhoids and Crohn's disease and thus on occasion she does notice some blood in her stools. Otherwise, she is on Depo Provera and is amenorrhea from that. She denies any frequent epistaxis. No oral mucosal bleeding. According to the patient, she had underwent 6 spinal surgeries in the past with no bleeding complications. She also underwent \"gallbladder\" surgery in the past without any bleeding complications. She has had wisdom teeth extraction done in the past without any bleeding complications.     ROS:  Currently she only has one small faint ecchymotic lesion on her left pre-tibial area about the size of a quarter coin. Otherwise no significant bruising anywhere else.     Medication:  Current Outpatient Medications   Medication     acetaminophen-codeine (TYLENOL #3) 300-30 MG tablet     albuterol (PROAIR HFA/PROVENTIL HFA/VENTOLIN HFA) 108 (90 Base) MCG/ACT inhaler     ARIPiprazole (ABILIFY) 30 MG tablet     azelastine (OPTIVAR) 0.05 % ophthalmic solution     buPROPion (WELLBUTRIN XL) 300 MG 24 hr tablet     busPIRone (BUSPAR) 15 MG tablet     clonazePAM (KLONOPIN) 1 MG tablet     cyclobenzaprine (FLEXERIL) 10 MG tablet     dicyclomine (BENTYL) 20 MG tablet     DULoxetine (CYMBALTA) 30 MG capsule     EPINEPHrine (ANY BX GENERIC EQUIV) 0.3 MG/0.3ML injection 2-pack     furosemide (LASIX) 20 MG tablet     gabapentin (NEURONTIN) 300 MG capsule     HYDROmorphone (DILAUDID) 2 MG tablet     lidocaine (XYLOCAINE) 2 % solution     medroxyPROGESTERone (DEPO-PROVERA) 150 MG/ML IM injection     mesalamine ER (PENTASA) 500 MG CR capsule     metoprolol succinate ER (TOPROL-XL) 50 MG 24 hr tablet     ondansetron (ZOFRAN-ODT) 4 " MG ODT tab     pantoprazole (PROTONIX) 40 MG EC tablet     polyethylene glycol (MIRALAX) 17 g packet     rizatriptan (MAXALT-MLT) 5 MG ODT     sucralfate (CARAFATE) 1 GM tablet     No current facility-administered medications for this visit.     Allergies:     Allergies   Allergen Reactions     Iohexol Hives     Other reaction(s): Hives  IV contrast; patient states she tolerates contrast if she gets benadryl before  IV contrast; patient states she tolerates contrast if she gets benadryl before     Other Environmental Allergy Rash     Plastic tape     Baclofen      hives     Bees Hives, Swelling and Difficulty breathing     Contrast Dye      Hives,   Updated 5/10/2016 CT Contrast.     Iodine Hives     Metoclopramide      Other reaction(s): Tremors  LW Reaction: shaking/sweating     Midazolam      Other reaction(s): Agitation     Monosodium Glutamate      Morphine Other (See Comments)     Difficulty with urination     Nsaids Other (See Comments)     GI BLEED x2     Other (Do Not Use) Other (See Comments)     Xanaflex- pt becomes disoriented and loses bladder control     Reglan [Metoclopramide Hcl] Other (See Comments)     shaking     Soma [Carisoprodol] Visual Disturbance     Sleep walking     Tizanidine      Topamax Other (See Comments)     Topamax [Topiramate] Nausea     Tingling  GI/Vomit     Tramadol      Severe Headache, Seizure Risk     Tylenol W/Codeine [Acetaminophen-Codeine] Nausea and Itching     Tylenol 3     Versed Other (See Comments)     Zolmitriptan      Makes face feel like its twitching     Droperidol Anxiety     Flu Virus Vaccine Rash      Arm swelling     PmHx:  Past Medical History:   Diagnosis Date     Abdominal pain 10/31- 11/4/2005    [facility] admit [facility]     Allergic rhinitis, cause unspecified     Allergic rhinitis     Arthritis      C. difficile diarrhea     Past, no current diarrhea.     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    Family History:    Katy mother reports that Katy's aunt and maternal grandmother have a history of low hemoglobin and required blood transfusion with surgeries in the past. But no clear bleeding disorder ever identified.     Katy mother herself has no history of bleeding diathesis.     Katy father reportedly has no history of bleeding diathesis.     Katy has one brother with no history of bleeding diathesis.       Objective:  Pleasant in no acute distress.  Vitals: BP (!) 134/90 (BP Location: Right arm, Patient Position: Sitting, Cuff Size: Adult Regular)   Pulse 87   Temp 98.2  F (36.8  C) (Oral)   Resp 14   Ht 1.575 m (5' 2\")   Wt 84 kg (185 lb 3.2 oz)   LMP  (LMP Unknown)   SpO2 100%   BMI 33.87 kg/m    Exam:  There is a quarter coin size ecchymotic lesion that is rather faint on her left pre-tibial area with no swelling or induration. There are no other hematoma noted over her left lower " extremities on both of her upper extremities.     Labs:  As described in the HPI section of this note.     Assessment / Plan:  In summary, Katy is a 31 year old female who has hearing impairment requiring , who also has a history of borderline personality disorder, narcotic dependency, drug seeking behavior, Crohn's disease, fibromyalgia, UTI, irritable bowel syndrome, PTSD, anxiety and depression who is referred by Aura Rosario PA-C, her primary care provider, for bruising of her legs back in Aug 2021. Her bruising back in Aug 2021 was related to trauma when she was falling frequently. Her bruising has significantly improved since her psychiatrist had adjusted her medications and since she has less falling events.     Other than nuisance bruising, she really has no other bleeding complications in the past or currently. Also her INR and aPTT along with normal platelet count and hemoglobin, it is extremely unlikely that she has any inherit bleeding disorders. It is conceivable that Cymbalta, which is a serotonin and norepinephrine reuptake inhibitor could cause some platelet inhibition and lead to easy bruising. I do not think that she necessary need to discontinue this as her bruising is not significant since she has no further falling events.     At this time, there are no indications for any further bleeding disorders workup.     All these were explained to the patient via  and to her mother. All questions are answered to their satisfaction.     At this time, I have no further plans to see this patient back on a routine basis.     Thank you for letting us to participate in this patient's care.     Diagnosis:    Easy bruising. Resolved after psychiatrist adjust some of her medications and thus decrease her risks of falling.     39 minutes spent on the date of the encounter doing chart review, history and exam, documentation and further  activities per the note.    Time IN: 13:06  Time OUT: 13:26           Triston Schroeder PA-C, MPAS  Physician Assistant  [facility].

## 2021-10-07 NOTE — NURSING NOTE
Katy Islas  [id number]  1990    Katy Islas is here for [facility] visit with Triston Schroeder PA-C .     Vital signs were taken and assessed.  Allergies and medications were reviewed and updated by [facility] staff.  Ida Maldonado, MSN, RN, PHN -Nurse Clinician, [facility] [phone]

## 2021-10-07 NOTE — LETTER
[facility]  [address] [place] [address], [place] [address]  Main: [phone], Fax: [phone]    [facility] at: [facility] at [address] [place] [address]    Outpatient Visit Note:    Patient: Katy Islas  MRN: [id number]  : 1990  TESSA: 2021    Reason:  Easy bruising.     HPI:  Katy is a 31 year old female who has hearing impairment requiring , who also has a history of borderline personality disorder, narcotic dependency, drug seeking behavior, Crohn's disease, fibromyalgia, UTI, irritable bowel syndrome, PTSD, anxiety and depression who is referred by Aura Rosario PA-C, her primary care provider, for bruising of her legs back in Aug 2021.      is present throughout the entire clinic visit today.     Back in 2021, she apparently presented to the emergency department with bruising to her bilateral lower extremities, flank area, arms and bottom. At the time she reports that she falls often without remembering what lead up to her falls. CT abdomen was done showed no retroperitoneal bleeding.   She has taken a few pictures of her legs and had sent it via [employer] and these pictures are available in the Media tap. I have reviewed these pictures today.     She is currently on a serotonin norepinephrine reuptake inhibitor (SNRI) with Cymbalta. According to her mother today, who accompanied the patient to this clinic, Katy was also on Wellbutrin at the time as well as buspirone. Subsequently these medications were discontinued by her psychiatrist. Since then, Katy has had no issues with frequent falling and her bruising has significantly improved.     As mentioned, Katy has a history of anxiety, she has had at least 21 emergency room visits since 2021.     Her most recent labs on 2021 shows a normal CBC, WBC at 4.4; Hgb 13.7;  "Platelet count 244K. Comprehensive metabolic panel showed Alk phos at 90; AST 23; ALT 90; Creatinine 0.83. INR was normal at 1.0 on 9/7/2021; APTT on 9/7/2021 was 22.6 (normal 24.0-31s). 9/5/2021 APTT was 27s and INR of 1.09.     Bleeding history:  Katy reports no other bleeding issues. She does have hemorrhoids and Crohn's disease and thus on occasion she does notice some blood in her stools. Otherwise, she is on Depo Provera and is amenorrhea from that. She denies any frequent epistaxis. No oral mucosal bleeding. According to the patient, she had underwent 6 spinal surgeries in the past with no bleeding complications. She also underwent \"gallbladder\" surgery in the past without any bleeding complications. She has had wisdom teeth extraction done in the past without any bleeding complications.     ROS:  Currently she only has one small faint ecchymotic lesion on her left pre-tibial area about the size of a quarter coin. Otherwise no significant bruising anywhere else.     Medication:  Current Outpatient Medications   Medication     acetaminophen-codeine (TYLENOL #3) 300-30 MG tablet     albuterol (PROAIR HFA/PROVENTIL HFA/VENTOLIN HFA) 108 (90 Base) MCG/ACT inhaler     ARIPiprazole (ABILIFY) 30 MG tablet     azelastine (OPTIVAR) 0.05 % ophthalmic solution     buPROPion (WELLBUTRIN XL) 300 MG 24 hr tablet     busPIRone (BUSPAR) 15 MG tablet     clonazePAM (KLONOPIN) 1 MG tablet     cyclobenzaprine (FLEXERIL) 10 MG tablet     dicyclomine (BENTYL) 20 MG tablet     DULoxetine (CYMBALTA) 30 MG capsule     EPINEPHrine (ANY BX GENERIC EQUIV) 0.3 MG/0.3ML injection 2-pack     furosemide (LASIX) 20 MG tablet     gabapentin (NEURONTIN) 300 MG capsule     HYDROmorphone (DILAUDID) 2 MG tablet     lidocaine (XYLOCAINE) 2 % solution     medroxyPROGESTERone (DEPO-PROVERA) 150 MG/ML IM injection     mesalamine ER (PENTASA) 500 MG CR capsule     metoprolol succinate ER (TOPROL-XL) 50 MG 24 hr tablet     ondansetron (ZOFRAN-ODT) 4 " MG ODT tab     pantoprazole (PROTONIX) 40 MG EC tablet     polyethylene glycol (MIRALAX) 17 g packet     rizatriptan (MAXALT-MLT) 5 MG ODT     sucralfate (CARAFATE) 1 GM tablet     No current facility-administered medications for this visit.     Allergies:     Allergies   Allergen Reactions     Iohexol Hives     Other reaction(s): Hives  IV contrast; patient states she tolerates contrast if she gets benadryl before  IV contrast; patient states she tolerates contrast if she gets benadryl before     Other Environmental Allergy Rash     Plastic tape     Baclofen      hives     Bees Hives, Swelling and Difficulty breathing     Contrast Dye      Hives,   Updated 5/10/2016 CT Contrast.     Iodine Hives     Metoclopramide      Other reaction(s): Tremors  LW Reaction: shaking/sweating     Midazolam      Other reaction(s): Agitation     Monosodium Glutamate      Morphine Other (See Comments)     Difficulty with urination     Nsaids Other (See Comments)     GI BLEED x2     Other (Do Not Use) Other (See Comments)     Xanaflex- pt becomes disoriented and loses bladder control     Reglan [Metoclopramide Hcl] Other (See Comments)     shaking     Soma [Carisoprodol] Visual Disturbance     Sleep walking     Tizanidine      Topamax Other (See Comments)     Topamax [Topiramate] Nausea     Tingling  GI/Vomit     Tramadol      Severe Headache, Seizure Risk     Tylenol W/Codeine [Acetaminophen-Codeine] Nausea and Itching     Tylenol 3     Versed Other (See Comments)     Zolmitriptan      Makes face feel like its twitching     Droperidol Anxiety     Flu Virus Vaccine Rash      Arm swelling     PmHx:  Past Medical History:   Diagnosis Date     Abdominal pain 10/31- 11/4/2005    [facility] admit for [facility]hn[facility]     Allergic rhinitis, cause unspecified     Allergic rhinitis     Arthritis      C. difficile diarrhea     Past, no current diarrhea.     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    Family History:    Katy mother reports that Katy's aunt and maternal grandmother have a history of low hemoglobin and required blood transfusion with surgeries in the past. But no clear bleeding disorder ever identified.     Katy mother herself has no history of bleeding diathesis.     Katy father reportedly has no history of bleeding diathesis.     Katy has one brother with no history of bleeding diathesis.       Objective:  Pleasant in no acute distress.  Vitals: BP (!) 134/90 (BP Location: Right arm, Patient Position: Sitting, Cuff Size: Adult Regular)   Pulse 87   Temp 98.2  F (36.8  C) (Oral)   Resp 14   Ht 1.575 m (5' 2\")   Wt 84 kg (185 lb 3.2 oz)   LMP  (LMP Unknown)   SpO2 100%   BMI 33.87 kg/m    Exam:  There is a quarter coin size ecchymotic lesion that is rather faint on her left pre-tibial area with no swelling or induration. There are no other hematoma noted over her left lower " extremities on both of her upper extremities.     Labs:  As described in the HPI section of this note.     Assessment / Plan:  In summary, Katy is a 31 year old female who has hearing impairment requiring , who also has a history of borderline personality disorder, narcotic dependency, drug seeking behavior, Crohn's disease, fibromyalgia, UTI, irritable bowel syndrome, PTSD, anxiety and depression who is referred by Aura Rosario PA-C, her primary care provider, for bruising of her legs back in Aug 2021. Her bruising back in Aug 2021 was related to trauma when she was falling frequently. Her bruising has significantly improved since her psychiatrist had adjusted her medications and since she has less falling events.     Other than nuisance bruising, she really has no other bleeding complications in the past or currently. Also her INR and aPTT along with normal platelet count and hemoglobin, it is extremely unlikely that she has any inherit bleeding disorders. It is conceivable that Cymbalta, which is a serotonin and norepinephrine reuptake inhibitor could cause some platelet inhibition and lead to easy bruising. I do not think that she necessary need to discontinue this as her bruising is not significant since she has no further falling events.     At this time, there are no indications for any further bleeding disorders workup.     All these were explained to the patient via  and to her mother. All questions are answered to their satisfaction.     At this time, I have no further plans to see this patient back on a routine basis.     Thank you for letting us to participate in this patient's care.     Diagnosis:    Easy bruising. Resolved after psychiatrist adjust some of her medications and thus decrease her risks of falling.     39 minutes spent on the date of the encounter doing chart review, history and exam, documentation and further  activities per the note.    Time IN: 13:06  Time OUT: 13:26           Triston Schroeder PA-C, MPAS  Physician Assistant  [facility].

## 2021-10-11 DIAGNOSIS — K21.9 GASTROESOPHAGEAL REFLUX DISEASE, UNSPECIFIED WHETHER ESOPHAGITIS PRESENT: ICD-10-CM

## 2021-10-23 ENCOUNTER — HEALTH MAINTENANCE LETTER (OUTPATIENT)
Age: 31
End: 2021-10-23

## 2021-12-30 ENCOUNTER — MYC MEDICAL ADVICE (OUTPATIENT)
Dept: FAMILY MEDICINE | Facility: CLINIC | Age: 31
End: 2021-12-30
Payer: MEDICARE

## 2021-12-30 ENCOUNTER — TELEPHONE (OUTPATIENT)
Dept: FAMILY MEDICINE | Facility: OTHER | Age: 31
End: 2021-12-30
Payer: MEDICARE

## 2021-12-30 DIAGNOSIS — G89.29 CHRONIC BILATERAL LOW BACK PAIN WITH LEFT-SIDED SCIATICA: ICD-10-CM

## 2021-12-30 DIAGNOSIS — M54.42 CHRONIC BILATERAL LOW BACK PAIN WITH LEFT-SIDED SCIATICA: ICD-10-CM

## 2021-12-30 RX ORDER — CYCLOBENZAPRINE HCL 10 MG
10 TABLET ORAL 3 TIMES DAILY PRN
Qty: 45 TABLET | Refills: 0 | OUTPATIENT
Start: 2021-12-30

## 2021-12-30 NOTE — TELEPHONE ENCOUNTER
Spoke with patient through [employer] inter[employer].    Car broke down while in [place] on vacation.  She is calling with multiple concerns  1) Concerns about breast pain 4-6/10. Breast pain for a few months. Increased cup size as well in that timeframe.  Off and on pain.     2) Was supposed to have mammo and CT as she is still struggling with pneumonia: started approx 6 weeks ago.  Was hospitalized for this and would like to follow up     3) Would like refills on eliquis 5 mg BID, gabapentin, and flexeril.      Was quick to get off the phone, wanted request sent as there is no current openings.    [phone] and still uses [employer].    Would like to be seen (video) by CDL or DJFA      Will route to providers for review of work in request.    Iggy Bernstein, EDYTAN, RN, PHN  [facility] ~ Registered Nurse  Clinic Triage ~ [place] [facility]  December 30, 2021

## 2021-12-30 NOTE — TELEPHONE ENCOUNTER
Needs appointment can schedule next opening with CDL, or me (may use any remaining acute/provider approval spots for me to schedule.)    Bacilio Adams MD  [facility]

## 2021-12-30 NOTE — TELEPHONE ENCOUNTER
Pending Prescriptions:                       Disp   Refills    cyclobenzaprine (FLEXERIL) 10 MG tablet    45 tab*0        Sig: Take 1 tablet (10 mg) by mouth 3 times daily as           needed (pain)    Routing refill request to provider for review/approval because:  Drug not on the [facility] refill protocol

## 2021-12-30 NOTE — TELEPHONE ENCOUNTER
Can't accomodate today as full schedule and not of these issues are urgent.     Will await DFA to see if can put on schedule.     Gabapentin - should have refills on file. Flexeril would need apt to refill.   Chart doesn't show she is on Eliquis, so would need apt to refill.     Zach Tomlinson-LOVE Moss  [facility]

## 2021-12-31 ENCOUNTER — VIRTUAL VISIT (OUTPATIENT)
Dept: FAMILY MEDICINE | Facility: CLINIC | Age: 31
End: 2021-12-31
Payer: MEDICARE

## 2021-12-31 DIAGNOSIS — G89.29 CHRONIC BILATERAL LOW BACK PAIN WITH LEFT-SIDED SCIATICA: ICD-10-CM

## 2021-12-31 DIAGNOSIS — N63.0 LUMP OR MASS IN BREAST: Primary | ICD-10-CM

## 2021-12-31 DIAGNOSIS — R92.8 OTHER ABNORMAL AND INCONCLUSIVE FINDINGS ON DIAGNOSTIC IMAGING OF BREAST: ICD-10-CM

## 2021-12-31 DIAGNOSIS — M54.42 CHRONIC BILATERAL LOW BACK PAIN WITH LEFT-SIDED SCIATICA: ICD-10-CM

## 2021-12-31 DIAGNOSIS — K21.9 GASTROESOPHAGEAL REFLUX DISEASE, UNSPECIFIED WHETHER ESOPHAGITIS PRESENT: ICD-10-CM

## 2021-12-31 DIAGNOSIS — I82.621 ACUTE DEEP VEIN THROMBOSIS (DVT) OF RIGHT UPPER EXTREMITY, UNSPECIFIED VEIN (H): ICD-10-CM

## 2021-12-31 PROCEDURE — 99442 PR PHYSICIAN TELEPHONE EVALUATION 11-20 MIN: CPT | Mod: 95 | Performed by: FAMILY MEDICINE

## 2021-12-31 RX ORDER — GABAPENTIN 300 MG/1
300 CAPSULE ORAL 3 TIMES DAILY
Qty: 270 CAPSULE | Refills: 0 | Status: SHIPPED | OUTPATIENT
Start: 2021-12-31

## 2021-12-31 RX ORDER — APIXABAN 2.5 MG/1
TABLET, FILM COATED ORAL
COMMUNITY
Start: 2021-12-07 | End: 2022-01-31

## 2021-12-31 RX ORDER — NIFEDIPINE 60 MG/1
TABLET, EXTENDED RELEASE ORAL
COMMUNITY
Start: 2021-12-07 | End: 2022-02-09

## 2021-12-31 RX ORDER — HYDROCODONE BITARTRATE AND ACETAMINOPHEN 5; 325 MG/1; MG/1
TABLET ORAL
COMMUNITY
Start: 2021-11-02 | End: 2022-01-04

## 2021-12-31 RX ORDER — CYCLOBENZAPRINE HCL 10 MG
10 TABLET ORAL 3 TIMES DAILY PRN
Qty: 45 TABLET | Refills: 0 | Status: SHIPPED | OUTPATIENT
Start: 2021-12-31 | End: 2022-01-14

## 2021-12-31 NOTE — PROGRESS NOTES
Ktay is a 31 year old who is being evaluated via a billable telephone visit.      What phone number would you like to be contacted at? [phone]    How would you like to obtain your AVS? Billie    Assessment & Plan   1. Lump or mass in breast: Will obtain diagnostic mammogram and ultrasound for evaluation of breast lump  - MA Diagnostic Digital Bilateral; Future  - US Breast Left Complete 4 Quadrants; Future    2. Acute deep vein thrombosis (DVT) of right upper extremity, unspecified vein (H): Need to clarify diagnosis and duration prior to refills. She has some available to her. Will obtain GERMAN from [facility].    3. Chronic bilateral low back pain with left-sided sciatica: Refilled for patient per request.  - cyclobenzaprine (FLEXERIL) 10 MG tablet; Take 1 tablet (10 mg) by mouth 3 times daily as needed (pain)  Dispense: 45 tablet; Refill: 0  - gabapentin (NEURONTIN) 300 MG capsule; Take 1 capsule (300 mg) by mouth 3 times daily  Dispense: 270 capsule; Refill: 0    4. Gastroesophageal reflux disease, unspecified whether esophagitis present: Declined norco Rx. Prefer Tylenol 3#.  reviewed. Patient agreeable.  - acetaminophen-codeine (TYLENOL #3) 300-30 MG tablet; Take 1 tablet by mouth every 8 hours as needed for severe pain  Dispense: 25 tablet; Refill: 0      Return in about 2 weeks (around 1/14/2022), or if symptoms worsen or fail to improve.    Bacilio Adams MD  [facility]    This chart is completed utilizing dictation software; typos and/or incorrect word substitutions may unintentionally occur. ;an      Subjective   Katy is a 31 year old who presents for the following health issues:    HPI     Concern - Breast Pain  Onset: A few months  Description: Pain in both breasts, left breast is more bothersome  Intensity: 5/10  Progression of Symptoms:  Worsening over the past few weeks  Accompanying Signs & Symptoms: No menstrual cycle, increase in cup  "size. Stopped the depo shot due to a blood clot in right arm. Currently on Eliquis 2 times a day  Previous history of similar problem: None  Therapies tried and outcome: Norco - helped pain. Requesting more refills.     Was at ER in [place] a month ago. Patient states they found lump in left breast and was told she needed to see a breast specialist and get a mammogram. Patient requesting refill for Norco, Flexeril, Gabapentin as she left these RX in [place].    She moved there, but is back visiting family for the holiday and her car \"broke down\". Will not be traveling to [place] for 3-4 weeks.     Reports finding RUE DVT and is on Eliquis for this. Has 1 month left. Doesn't know duration of how long she is supposed to be on this.    No other questions or concerns.    No weight changes, night sweats, etc. Does not overlying skin changes of redness of left breast.    Patient would like to discuss abdominal pain she has had for a week. She states she has had diarrhea. She has been taking Norco and bentyl for pain. Patient states she his under a lot of stress.     Unable to do video visit given need for . Unable to come in person as she is in [facility].    Review of Systems   Constitutional, HEENT, lymph, derm, msk, breast, cardiovascular, pulmonary, gi and gu systems are negative, except as otherwise noted.      Objective       Vitals:  No vitals were obtained today due to virtual visit.    Physical Exam   Exam not completed over telephone visit.    Imaging pending        Phone call duration: 17 minutes  "

## 2022-01-03 ENCOUNTER — MYC MEDICAL ADVICE (OUTPATIENT)
Dept: FAMILY MEDICINE | Facility: CLINIC | Age: 32
End: 2022-01-03
Payer: MEDICARE

## 2022-01-03 ENCOUNTER — NURSE TRIAGE (OUTPATIENT)
Dept: FAMILY MEDICINE | Facility: OTHER | Age: 32
End: 2022-01-03
Payer: MEDICARE

## 2022-01-03 ENCOUNTER — HOSPITAL ENCOUNTER (EMERGENCY)
Facility: CLINIC | Age: 32
Discharge: HOME | End: 2022-01-03
Admitting: EMERGENCY MEDICINE
Payer: MEDICARE

## 2022-01-03 ENCOUNTER — APPOINTMENT (OUTPATIENT)
Dept: GENERAL RADIOLOGY | Facility: CLINIC | Age: 32
End: 2022-01-03
Attending: EMERGENCY MEDICINE
Payer: MEDICARE

## 2022-01-03 VITALS
RESPIRATION RATE: 18 BRPM | SYSTOLIC BLOOD PRESSURE: 132 MMHG | DIASTOLIC BLOOD PRESSURE: 93 MMHG | BODY MASS INDEX: 32.2 KG/M2 | HEART RATE: 104 BPM | WEIGHT: 175 LBS | TEMPERATURE: 97.2 F | HEIGHT: 62 IN | OXYGEN SATURATION: 99 %

## 2022-01-03 LAB
ANION GAP SERPL CALCULATED.3IONS-SCNC: 5 MMOL/L (ref 3–14)
BASOPHILS # BLD AUTO: 0 10E3/UL (ref 0–0.2)
BASOPHILS NFR BLD AUTO: 0 %
BUN SERPL-MCNC: 16 MG/DL (ref 7–30)
CALCIUM SERPL-MCNC: 9.9 MG/DL (ref 8.5–10.1)
CHLORIDE BLD-SCNC: 107 MMOL/L (ref 94–109)
CO2 SERPL-SCNC: 27 MMOL/L (ref 20–32)
CREAT SERPL-MCNC: 0.8 MG/DL (ref 0.52–1.04)
EOSINOPHIL # BLD AUTO: 0.1 10E3/UL (ref 0–0.7)
EOSINOPHIL NFR BLD AUTO: 1 %
ERYTHROCYTE [DISTWIDTH] IN BLOOD BY AUTOMATED COUNT: 13.8 % (ref 10–15)
GFR SERPL CREATININE-BSD FRML MDRD: >90 ML/MIN/1.73M2
GLUCOSE BLD-MCNC: 105 MG/DL (ref 70–99)
HCG SERPL QL: NEGATIVE
HCT VFR BLD AUTO: 39.5 % (ref 35–47)
HGB BLD-MCNC: 13.5 G/DL (ref 11.7–15.7)
HOLD SPECIMEN: NORMAL
IMM GRANULOCYTES # BLD: 0 10E3/UL
IMM GRANULOCYTES NFR BLD: 0 %
LYMPHOCYTES # BLD AUTO: 1.7 10E3/UL (ref 0.8–5.3)
LYMPHOCYTES NFR BLD AUTO: 37 %
MAGNESIUM SERPL-MCNC: 2 MG/DL (ref 1.6–2.3)
MCH RBC QN AUTO: 33.1 PG (ref 26.5–33)
MCHC RBC AUTO-ENTMCNC: 34.2 G/DL (ref 31.5–36.5)
MCV RBC AUTO: 97 FL (ref 78–100)
MONOCYTES # BLD AUTO: 0.5 10E3/UL (ref 0–1.3)
MONOCYTES NFR BLD AUTO: 10 %
NEUTROPHILS # BLD AUTO: 2.3 10E3/UL (ref 1.6–8.3)
NEUTROPHILS NFR BLD AUTO: 52 %
NRBC # BLD AUTO: 0 10E3/UL
NRBC BLD AUTO-RTO: 0 /100
PLATELET # BLD AUTO: 266 10E3/UL (ref 150–450)
POTASSIUM BLD-SCNC: 4.2 MMOL/L (ref 3.4–5.3)
RBC # BLD AUTO: 4.08 10E6/UL (ref 3.8–5.2)
SODIUM SERPL-SCNC: 139 MMOL/L (ref 133–144)
TROPONIN I SERPL HS-MCNC: <3 NG/L
TSH SERPL DL<=0.005 MIU/L-ACNC: 0.44 MU/L (ref 0.4–4)
WBC # BLD AUTO: 4.5 10E3/UL (ref 4–11)

## 2022-01-03 PROCEDURE — 82310 ASSAY OF CALCIUM: CPT | Performed by: FAMILY MEDICINE

## 2022-01-03 PROCEDURE — 84443 ASSAY THYROID STIM HORMONE: CPT | Performed by: EMERGENCY MEDICINE

## 2022-01-03 PROCEDURE — 96374 THER/PROPH/DIAG INJ IV PUSH: CPT

## 2022-01-03 PROCEDURE — 85025 COMPLETE CBC W/AUTO DIFF WBC: CPT | Performed by: FAMILY MEDICINE

## 2022-01-03 PROCEDURE — 93005 ELECTROCARDIOGRAM TRACING: CPT

## 2022-01-03 PROCEDURE — 250N000011 HC RX IP 250 OP 636: Performed by: EMERGENCY MEDICINE

## 2022-01-03 PROCEDURE — 83735 ASSAY OF MAGNESIUM: CPT | Performed by: EMERGENCY MEDICINE

## 2022-01-03 PROCEDURE — 71046 X-RAY EXAM CHEST 2 VIEWS: CPT | Mod: 26 | Performed by: RADIOLOGY

## 2022-01-03 PROCEDURE — 84484 ASSAY OF TROPONIN QUANT: CPT | Performed by: FAMILY MEDICINE

## 2022-01-03 PROCEDURE — 84703 CHORIONIC GONADOTROPIN ASSAY: CPT | Performed by: EMERGENCY MEDICINE

## 2022-01-03 PROCEDURE — 999N000104 HC STATISTIC NO CHARGE

## 2022-01-03 PROCEDURE — 36415 COLL VENOUS BLD VENIPUNCTURE: CPT | Performed by: FAMILY MEDICINE

## 2022-01-03 PROCEDURE — 71046 X-RAY EXAM CHEST 2 VIEWS: CPT

## 2022-01-03 RX ORDER — ONDANSETRON 2 MG/ML
4 INJECTION INTRAMUSCULAR; INTRAVENOUS ONCE
Status: COMPLETED | OUTPATIENT
Start: 2022-01-03 | End: 2022-01-03

## 2022-01-03 RX ADMIN — ONDANSETRON 4 MG: 2 INJECTION INTRAMUSCULAR; INTRAVENOUS at 16:24

## 2022-01-03 ASSESSMENT — MIFFLIN-ST. JEOR: SCORE: 1457.04

## 2022-01-03 NOTE — TELEPHONE ENCOUNTER
I will not be prescribing Norco. Inform patient.    Bacilio Adams MD  [facility]

## 2022-01-03 NOTE — TELEPHONE ENCOUNTER
Pt is in [place] visiting friends. Was supposed to go home but her car broke down so she is stuck here. She was using oxygen at home for pneumonia. She has had several bouts of pneumonia. She ran out of her oxygen and she doesn't have any here. Her oxygen is at 89% RA and her pulse is at 146. Pt reports that she is also having chest pain.     Per protocol, advised that pt go to the ER. Pt verbalizes understanding and is in agreement with plan.     EDYTA GodoyN, RN, PHN  Registered Nurse-Clinic Triage  [facility]  1/3/2022 at 12:27 PM       Reason for Disposition    MODERATE difficulty breathing (e.g., speaks in phrases, SOB even at rest, pulse 100-120) of new onset or worse than normal    Additional Information    Negative: Chest pain    Negative: Wheezing (high pitched whistling sound) and previous asthma attacks or use of asthma medicines    Negative: Difficulty breathing and only present when coughing    Negative: Difficulty breathing and only from stuffy or runny nose    Negative: Difficulty breathing and within 14 days of COVID-19 Exposure    Negative: Breathing stopped and hasn't returned    Negative: Choking on something    Negative: SEVERE difficulty breathing (e.g., struggling for each breath, speaks in single words, pulse > 120)    Negative: Bluish (or gray) lips or face    Negative: Difficult to awaken or acting confused (e.g., disoriented, slurred speech)    Negative: Passed out (i.e., fainted, collapsed and was not responding)    Negative: Wheezing started suddenly after medicine, an allergic food, or bee sting    Negative: Stridor    Negative: Slow, shallow and weak breathing    Negative: Sounds like a life-threatening emergency to the triager    Protocols used: BREATHING DIFFICULTY-A-OH

## 2022-01-03 NOTE — TELEPHONE ENCOUNTER
Without an in person visit for further evaluation narcotic pain medications are not indicated, and she has gotten prior Rx from provider out of [place] per report.    Should follow-up in person with me or closest [place] provider to her if she cannot make it to our location given car troubles to review Mammogram/ultrasound results and exam to determine pain needs.    Bacilio Adams MD  [facility]

## 2022-01-03 NOTE — ED TRIAGE NOTES
Pt comes in with chest pain (that radiates into abdomen) and palpitations that started this morning. Hx blood clots, on Eliquis.   Intact

## 2022-01-04 ENCOUNTER — NURSE TRIAGE (OUTPATIENT)
Dept: NURSING | Facility: CLINIC | Age: 32
End: 2022-01-04
Payer: MEDICARE

## 2022-01-04 ENCOUNTER — HOSPITAL ENCOUNTER (EMERGENCY)
Facility: CLINIC | Age: 32
Discharge: HOME | End: 2022-01-04
Attending: EMERGENCY MEDICINE | Admitting: EMERGENCY MEDICINE
Payer: MEDICARE

## 2022-01-04 VITALS
HEIGHT: 62 IN | SYSTOLIC BLOOD PRESSURE: 132 MMHG | OXYGEN SATURATION: 100 % | RESPIRATION RATE: 18 BRPM | BODY MASS INDEX: 32.2 KG/M2 | HEART RATE: 131 BPM | WEIGHT: 175 LBS | DIASTOLIC BLOOD PRESSURE: 82 MMHG

## 2022-01-04 DIAGNOSIS — R07.9 CHEST PAIN, UNSPECIFIED TYPE: ICD-10-CM

## 2022-01-04 LAB
ALBUMIN SERPL-MCNC: 3.4 G/DL (ref 3.4–5)
ALP SERPL-CCNC: 91 U/L (ref 40–150)
ALT SERPL W P-5'-P-CCNC: 64 U/L (ref 0–50)
ANION GAP SERPL CALCULATED.3IONS-SCNC: 4 MMOL/L (ref 3–14)
AST SERPL W P-5'-P-CCNC: 25 U/L (ref 0–45)
ATRIAL RATE - MUSE: 102 BPM
ATRIAL RATE - MUSE: 111 BPM
BASOPHILS # BLD AUTO: 0 10E3/UL (ref 0–0.2)
BASOPHILS NFR BLD AUTO: 0 %
BILIRUB SERPL-MCNC: 0.2 MG/DL (ref 0.2–1.3)
BUN SERPL-MCNC: 13 MG/DL (ref 7–30)
CALCIUM SERPL-MCNC: 9.7 MG/DL (ref 8.5–10.1)
CHLORIDE BLD-SCNC: 106 MMOL/L (ref 94–109)
CO2 SERPL-SCNC: 28 MMOL/L (ref 20–32)
CREAT SERPL-MCNC: 0.8 MG/DL (ref 0.52–1.04)
D DIMER PPP FEU-MCNC: <0.27 UG/ML FEU (ref 0–0.5)
DIASTOLIC BLOOD PRESSURE - MUSE: NORMAL MMHG
DIASTOLIC BLOOD PRESSURE - MUSE: NORMAL MMHG
EOSINOPHIL # BLD AUTO: 0.1 10E3/UL (ref 0–0.7)
EOSINOPHIL NFR BLD AUTO: 1 %
ERYTHROCYTE [DISTWIDTH] IN BLOOD BY AUTOMATED COUNT: 13.4 % (ref 10–15)
FLUAV RNA SPEC QL NAA+PROBE: NEGATIVE
FLUBV RNA RESP QL NAA+PROBE: NEGATIVE
GFR SERPL CREATININE-BSD FRML MDRD: >90 ML/MIN/1.73M2
GLUCOSE BLD-MCNC: 90 MG/DL (ref 70–99)
HCG SERPL QL: NEGATIVE
HCT VFR BLD AUTO: 37.2 % (ref 35–47)
HGB BLD-MCNC: 12.8 G/DL (ref 11.7–15.7)
IMM GRANULOCYTES # BLD: 0 10E3/UL
IMM GRANULOCYTES NFR BLD: 0 %
INTERPRETATION ECG - MUSE: NORMAL
INTERPRETATION ECG - MUSE: NORMAL
LIPASE SERPL-CCNC: 84 U/L (ref 73–393)
LYMPHOCYTES # BLD AUTO: 1.8 10E3/UL (ref 0.8–5.3)
LYMPHOCYTES NFR BLD AUTO: 38 %
MCH RBC QN AUTO: 33.4 PG (ref 26.5–33)
MCHC RBC AUTO-ENTMCNC: 34.4 G/DL (ref 31.5–36.5)
MCV RBC AUTO: 97 FL (ref 78–100)
MONOCYTES # BLD AUTO: 0.4 10E3/UL (ref 0–1.3)
MONOCYTES NFR BLD AUTO: 10 %
NEUTROPHILS # BLD AUTO: 2.4 10E3/UL (ref 1.6–8.3)
NEUTROPHILS NFR BLD AUTO: 51 %
NRBC # BLD AUTO: 0 10E3/UL
NRBC BLD AUTO-RTO: 0 /100
NT-PROBNP SERPL-MCNC: 24 PG/ML (ref 0–450)
P AXIS - MUSE: 29 DEGREES
P AXIS - MUSE: 29 DEGREES
PLATELET # BLD AUTO: 253 10E3/UL (ref 150–450)
POTASSIUM BLD-SCNC: 4.3 MMOL/L (ref 3.4–5.3)
PR INTERVAL - MUSE: 116 MS
PR INTERVAL - MUSE: 126 MS
PROT SERPL-MCNC: 7 G/DL (ref 6.8–8.8)
QRS DURATION - MUSE: 76 MS
QRS DURATION - MUSE: 80 MS
QT - MUSE: 322 MS
QT - MUSE: 334 MS
QTC - MUSE: 435 MS
QTC - MUSE: 437 MS
R AXIS - MUSE: 63 DEGREES
R AXIS - MUSE: 68 DEGREES
RBC # BLD AUTO: 3.83 10E6/UL (ref 3.8–5.2)
RSV RNA SPEC NAA+PROBE: NEGATIVE
SARS-COV-2 RNA RESP QL NAA+PROBE: NEGATIVE
SODIUM SERPL-SCNC: 138 MMOL/L (ref 133–144)
SYSTOLIC BLOOD PRESSURE - MUSE: NORMAL MMHG
SYSTOLIC BLOOD PRESSURE - MUSE: NORMAL MMHG
T AXIS - MUSE: 48 DEGREES
T AXIS - MUSE: 63 DEGREES
TROPONIN I SERPL HS-MCNC: 4 NG/L
VENTRICULAR RATE- MUSE: 102 BPM
VENTRICULAR RATE- MUSE: 111 BPM
WBC # BLD AUTO: 4.7 10E3/UL (ref 4–11)

## 2022-01-04 PROCEDURE — 96374 THER/PROPH/DIAG INJ IV PUSH: CPT | Performed by: EMERGENCY MEDICINE

## 2022-01-04 PROCEDURE — 83690 ASSAY OF LIPASE: CPT | Performed by: EMERGENCY MEDICINE

## 2022-01-04 PROCEDURE — 99285 EMERGENCY DEPT VISIT HI MDM: CPT | Mod: 25 | Performed by: EMERGENCY MEDICINE

## 2022-01-04 PROCEDURE — 85025 COMPLETE CBC W/AUTO DIFF WBC: CPT | Performed by: EMERGENCY MEDICINE

## 2022-01-04 PROCEDURE — 85379 FIBRIN DEGRADATION QUANT: CPT | Performed by: EMERGENCY MEDICINE

## 2022-01-04 PROCEDURE — 80053 COMPREHEN METABOLIC PANEL: CPT | Performed by: EMERGENCY MEDICINE

## 2022-01-04 PROCEDURE — 999N000128 HC STATISTIC PERIPHERAL IV START W/O US GUIDANCE

## 2022-01-04 PROCEDURE — 93010 ELECTROCARDIOGRAM REPORT: CPT | Performed by: EMERGENCY MEDICINE

## 2022-01-04 PROCEDURE — 96376 TX/PRO/DX INJ SAME DRUG ADON: CPT | Performed by: EMERGENCY MEDICINE

## 2022-01-04 PROCEDURE — 258N000003 HC RX IP 258 OP 636: Performed by: EMERGENCY MEDICINE

## 2022-01-04 PROCEDURE — 83880 ASSAY OF NATRIURETIC PEPTIDE: CPT | Performed by: EMERGENCY MEDICINE

## 2022-01-04 PROCEDURE — 84703 CHORIONIC GONADOTROPIN ASSAY: CPT | Performed by: EMERGENCY MEDICINE

## 2022-01-04 PROCEDURE — 93005 ELECTROCARDIOGRAM TRACING: CPT | Performed by: EMERGENCY MEDICINE

## 2022-01-04 PROCEDURE — C9803 HOPD COVID-19 SPEC COLLECT: HCPCS | Performed by: EMERGENCY MEDICINE

## 2022-01-04 PROCEDURE — 96361 HYDRATE IV INFUSION ADD-ON: CPT | Performed by: EMERGENCY MEDICINE

## 2022-01-04 PROCEDURE — 250N000011 HC RX IP 250 OP 636: Performed by: EMERGENCY MEDICINE

## 2022-01-04 PROCEDURE — 87637 SARSCOV2&INF A&B&RSV AMP PRB: CPT | Performed by: EMERGENCY MEDICINE

## 2022-01-04 PROCEDURE — 96375 TX/PRO/DX INJ NEW DRUG ADDON: CPT | Performed by: EMERGENCY MEDICINE

## 2022-01-04 PROCEDURE — 36415 COLL VENOUS BLD VENIPUNCTURE: CPT | Performed by: EMERGENCY MEDICINE

## 2022-01-04 PROCEDURE — 82040 ASSAY OF SERUM ALBUMIN: CPT | Performed by: EMERGENCY MEDICINE

## 2022-01-04 PROCEDURE — 84484 ASSAY OF TROPONIN QUANT: CPT | Performed by: EMERGENCY MEDICINE

## 2022-01-04 RX ORDER — HYDROCODONE BITARTRATE AND ACETAMINOPHEN 5; 325 MG/1; MG/1
2 TABLET ORAL ONCE
Status: DISCONTINUED | OUTPATIENT
Start: 2022-01-04 | End: 2022-01-04

## 2022-01-04 RX ORDER — HYDROMORPHONE HYDROCHLORIDE 1 MG/ML
0.5 INJECTION, SOLUTION INTRAMUSCULAR; INTRAVENOUS; SUBCUTANEOUS ONCE
Status: COMPLETED | OUTPATIENT
Start: 2022-01-04 | End: 2022-01-04

## 2022-01-04 RX ORDER — DIPHENHYDRAMINE HYDROCHLORIDE 50 MG/ML
50 INJECTION INTRAMUSCULAR; INTRAVENOUS ONCE
Status: COMPLETED | OUTPATIENT
Start: 2022-01-04 | End: 2022-01-04

## 2022-01-04 RX ORDER — ONDANSETRON 2 MG/ML
4 INJECTION INTRAMUSCULAR; INTRAVENOUS ONCE
Status: COMPLETED | OUTPATIENT
Start: 2022-01-04 | End: 2022-01-04

## 2022-01-04 RX ORDER — DIPHENHYDRAMINE HYDROCHLORIDE 50 MG/ML
25 INJECTION INTRAMUSCULAR; INTRAVENOUS ONCE
Status: COMPLETED | OUTPATIENT
Start: 2022-01-04 | End: 2022-01-04

## 2022-01-04 RX ORDER — HYDROCODONE BITARTRATE AND ACETAMINOPHEN 5; 325 MG/1; MG/1
1 TABLET ORAL EVERY 6 HOURS PRN
Qty: 8 TABLET | Refills: 0 | Status: SHIPPED | OUTPATIENT
Start: 2022-01-04 | End: 2022-01-07

## 2022-01-04 RX ORDER — ONDANSETRON 2 MG/ML
4 INJECTION INTRAMUSCULAR; INTRAVENOUS ONCE
Status: DISCONTINUED | OUTPATIENT
Start: 2022-01-04 | End: 2022-01-04

## 2022-01-04 RX ADMIN — DIPHENHYDRAMINE HYDROCHLORIDE 25 MG: 50 INJECTION, SOLUTION INTRAMUSCULAR; INTRAVENOUS at 14:29

## 2022-01-04 RX ADMIN — HYDROMORPHONE HYDROCHLORIDE 0.5 MG: 1 INJECTION, SOLUTION INTRAMUSCULAR; INTRAVENOUS; SUBCUTANEOUS at 14:32

## 2022-01-04 RX ADMIN — SODIUM CHLORIDE 1000 ML: 9 INJECTION, SOLUTION INTRAVENOUS at 11:37

## 2022-01-04 RX ADMIN — HYDROMORPHONE HYDROCHLORIDE 0.5 MG: 1 INJECTION, SOLUTION INTRAMUSCULAR; INTRAVENOUS; SUBCUTANEOUS at 13:21

## 2022-01-04 RX ADMIN — ONDANSETRON 4 MG: 2 INJECTION INTRAMUSCULAR; INTRAVENOUS at 14:30

## 2022-01-04 RX ADMIN — DIPHENHYDRAMINE HYDROCHLORIDE 50 MG: 50 INJECTION, SOLUTION INTRAMUSCULAR; INTRAVENOUS at 13:28

## 2022-01-04 ASSESSMENT — ENCOUNTER SYMPTOMS
SHORTNESS OF BREATH: 1
DIFFICULTY URINATING: 0
NECK PAIN: 0
EYE REDNESS: 0
FEVER: 0
SLEEP DISTURBANCE: 0
BACK PAIN: 0
HEADACHES: 0
ABDOMINAL PAIN: 0
SORE THROAT: 0
NAUSEA: 0
VOMITING: 0
DYSURIA: 0
COUGH: 1

## 2022-01-04 ASSESSMENT — MIFFLIN-ST. JEOR: SCORE: 1457.04

## 2022-01-04 NOTE — ED PROVIDER NOTES
ED Provider Note  [facility]      History     Chief Complaint   Patient presents with     Chest Pain     Shortness of Breath     HPI  Katy Islas is a 31 year old female who presents to the emergency department with complaints of left upper chest pain.  She complains of a stabbing pain in her left upper chest since yesterday.  She also states that she feels short of air.  The patient also reports a cough productive of green sputum.  She states that she had pneumonia 3 months ago and was discharged on oxygen.   Patient reports a history of DVT.  She states that she is on Eliquis.  She reports compliance with her Eliquis but does report that she does not always take it on time.    The patient came to the emergency department yesterday and received a chest radiograph which revealed chronic left hemidiaphragm elevation but no acute cardiopulmonary findings..  She left due to the long wait.         Past Medical History  Past Medical History:   Diagnosis Date     Abdominal pain 10/31- 11/4/2005    [facility] admit for Crohn's     Allergic rhinitis, cause unspecified     Allergic rhinitis     Arthritis      C. difficile diarrhea     Past, no current diarrhea.     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, without mention of intractable epilepsy     pseudoseizures diagnosed after extensive neurologic eval     Migraine 07/21/12    D/C 07/22/12-[facility]     Migraine, unspecified, without mention of intractable  migraine without mention of status migrainosus     Migraine     Mild intermittent asthma     mild intermittent     Mycoplasma infection in conditions classified elsewhere and of unspecified site      Other chronic pain     Back pain for 6 years     Renal disease     Kidney stones     Unspecified hearing loss     congenital hearing loss     Past Surgical History:   Procedure Laterality Date     AS REMOVAL OF COCCYX  10/18/2017     COLONOSCOPY  7/1/2009    [facility].     COLONOSCOPY N/A 7/17/2019    Procedure: Colonoscopy, With Polypectomy And Biopsy;  Surgeon: Edwin Conde MD;  Location: [facility] OR     COLONOSCOPY WITH CO2 INSUFFLATION N/A 7/17/2019    Procedure: COLONOSCOPY, WITH CO2 INSUFFLATION;  Surgeon: Edwin Conde MD;  Location: [facility] OR     COMBINED ESOPHAGOSCOPY, GASTROSCOPY, DUODENOSCOPY (EGD) WITH CO2 INSUFFLATION N/A 4/12/2019    Procedure: COMBINED ESOPHAGOSCOPY, GASTROSCOPY, DUODENOSCOPY (EGD) WITH CO2 INSUFFLATION;  Surgeon: Edwin Conde MD;  Location: [facility] OR     ESOPHAGOSCOPY, GASTROSCOPY, DUODENOSCOPY (EGD), COMBINED N/A 4/12/2019    Procedure: Combined Esophagoscopy, Gastroscopy, Duodenoscopy (Egd), Biopsy Single Or Multiple;  Surgeon: Edwin Conde MD;  Location: [facility] OR     FISTULOTOMY RECTUM N/A 4/30/2020    Procedure: EXAM UNDER ANESTHESIA, ANUS, parital fistulotomy;  Surgeon: Valentin Sanchez MD;  Location: [facility] OR     FUSION SPINE POSTERIOR MINIMALLY INVASIVE ONE LEVEL N/A 2/23/2017    Procedure: FUSION SPINE POSTERIOR MINIMALLY INVASIVE ONE LEVEL;  L4-5 Oblique Lateral Lumbar Interbody Fusion   Epidural steroid injection.   Transpedicular Bone marrow aspiration;  Surgeon: Jeniffer Eugene MD;  Location: [facility] OR     HC EEG AWAKE AND SLEEP      abnormal     HC MRI BRAIN W/O CONTRAST  12/00    normal     HC REMOVAL GALLBLADDER  8/5/2009    [facility].     HC UGI ENDOSCOPY DIAG W BIOPSY  11/11/09     Normal esophagus     ORTHOPEDIC SURGERY  October 19,2011    diskectomy L4-L5     ZZC FUSION OF SACROILIAC JOINT  10/26/2018     [facility]  [facility]/2003    Impression is that of normal appearing colonoscopy, without evidence of rectal bleeding.     ZZ COLONOSCOPY THRU STOMA, DIAGNOSTIC  10/00    normal     [facility] COLONOSCOPY THRU STOMA, DIAGNOSTIC  Oct 2009    Dr López- normal     [facility] UGI ENDOSCOPY, SIMPLE EXAM  7/00, 10/00    mild chronic esophagitis and duodenitis, neg H pylori     [facility]  01/20/2005    Esophagogastroduodenoscopy, colonoscopy with biopsies.  [facility]     [facility]  7/1/2009    [facility] [place]     [facility] UGI ENDOSCOPY, SIMPLE EXAM  11/11/2009    attempted upper GI, pt. could not tolerate procedure:[facility]     acetaminophen-codeine (TYLENOL #3) 300-30 MG tablet  albuterol (PROAIR HFA/PROVENTIL HFA/VENTOLIN HFA) 108 (90 Base) MCG/ACT inhaler  ARIPiprazole (ABILIFY) 30 MG tablet  azelastine (OPTIVAR) 0.05 % ophthalmic solution  buPROPion (WELLBUTRIN XL) 300 MG 24 hr tablet  busPIRone (BUSPAR) 15 MG tablet  clonazePAM (KLONOPIN) 1 MG tablet  cyclobenzaprine (FLEXERIL) 10 MG tablet  dicyclomine (BENTYL) 20 MG tablet  DULoxetine (CYMBALTA) 30 MG capsule  ELIQUIS ANTICOAGULANT 2.5 MG tablet  EPINEPHrine (ANY BX GENERIC EQUIV) 0.3 MG/0.3ML injection 2-pack  Ferrous Sulfate (IRON PO)  furosemide (LASIX) 20 MG tablet  gabapentin (NEURONTIN) 300 MG capsule  HYDROcodone-acetaminophen (NORCO) 5-325 MG tablet  HYDROmorphone (DILAUDID) 2 MG tablet  lidocaine (XYLOCAINE) 2 % solution  medroxyPROGESTERone (DEPO-PROVERA) 150 MG/ML IM injection  mesalamine ER (PENTASA) 500 MG CR capsule  metoprolol succinate ER (TOPROL-XL) 50 MG 24 hr tablet  NIFEdipine ER OSMOTIC (PROCARDIA XL) 60 MG 24 hr tablet  ondansetron (ZOFRAN-ODT) 4 MG ODT tab  pantoprazole (PROTONIX) 40 MG EC tablet  polyethylene  glycol (MIRALAX) 17 g packet  rizatriptan (MAXALT-MLT) 5 MG ODT  sucralfate (CARAFATE) 1 GM tablet      Allergies   Allergen Reactions     Iohexol Hives     Other reaction(s): Hives  IV contrast; patient states she tolerates contrast if she gets benadryl before  IV contrast; patient states she tolerates contrast if she gets benadryl before     Other Environmental Allergy Rash     Plastic tape     Baclofen      hives     Bees Hives, Swelling and Difficulty breathing     Contrast Dye      Hives,   Updated 5/10/2016 CT Contrast.     Iodine Hives     Metoclopramide      Other reaction(s): Tremors  LW Reaction: shaking/sweating     Midazolam      Other reaction(s): Agitation     Monosodium Glutamate      Morphine Other (See Comments)     Difficulty with urination     Nsaids Other (See Comments)     GI BLEED x2     Other (Do Not Use) Other (See Comments)     Xanaflex- pt becomes disoriented and loses bladder control     Reglan [Metoclopramide Hcl] Other (See Comments)     shaking     Soma [Carisoprodol] Visual Disturbance     Sleep walking     Tizanidine      Topamax Other (See Comments)     Topamax [Topiramate] Nausea     Tingling  GI/Vomit     Tramadol      Severe Headache, Seizure Risk     Tylenol W/Codeine [Acetaminophen-Codeine] Nausea and Itching     Tylenol 3     Versed Other (See Comments)     Zolmitriptan      Makes face feel like its twitching     Droperidol Anxiety     Flu Virus Vaccine Rash      Arm swelling     Family History  Family History   Problem Relation Age of Onset     Gastrointestinal Disease Brother         severe Crohn's     Neurologic Disorder Brother         Seizures post head injury     Depression Brother      Substance Abuse Brother      Genitourinary Problems Father         kidney stones     Diabetes Father      Heart Disease Father         Open heart surgery     Breast Cancer Maternal Grandmother      Parkinsonism Maternal Grandmother      Cerebrovascular Disease Paternal Grandmother       "Cancer Maternal Grandfather         Lung     Cardiovascular Paternal Grandfather         Heart Attack     Substance Abuse Mother         in recovery x1 year      Lung Cancer Paternal Uncle      Cancer Paternal Uncle      Lung Cancer Maternal Uncle      Colon Cancer Maternal Uncle      Social History   Social History     Tobacco Use     Smoking status: Former Smoker     Packs/day: 0.25     Years: 5.00     Pack years: 1.25     Types: Cigarettes     Smokeless tobacco: Never Used   Vaping Use     Vaping Use: Former     Substances: CBD     Devices: Disposable, Pre-filled or refillable cartridge   Substance Use Topics     Alcohol use: Not Currently     Comment: Not since last July 2018     Drug use: Not Currently     Types: Marijuana     Comment: Medical Marijuana currently-- More CBD      Past medical history, past surgical history, medications, allergies, family history, and social history were reviewed with the patient. No additional pertinent items.       Review of Systems   Constitutional: Negative for fever.   HENT: Negative for sore throat.    Eyes: Negative for redness.   Respiratory: Positive for cough and shortness of breath.    Cardiovascular: Positive for chest pain.   Gastrointestinal: Negative for abdominal pain, nausea and vomiting.   Genitourinary: Negative for difficulty urinating and dysuria.   Musculoskeletal: Negative for back pain and neck pain.   Skin: Negative for rash.   Neurological: Negative for headaches.   Psychiatric/Behavioral: Negative for sleep disturbance.   All other systems reviewed and are negative.    A complete review of systems was performed with pertinent positives and negatives noted in the HPI, and all other systems negative.    Physical Exam   BP: 132/82  Pulse: (!) 131  Resp: 18  Height: 157.5 cm (5' 2\")  Weight: 79.4 kg (175 lb)  SpO2: 100 %  Physical Exam  Vitals and nursing note reviewed.   Constitutional:       General: She is not in acute distress.     Appearance: She is " well-developed. She is not diaphoretic.   HENT:      Head: Atraumatic.      Mouth/Throat:      Pharynx: No oropharyngeal exudate.   Eyes:      General: No scleral icterus.     Pupils: Pupils are equal, round, and reactive to light.   Cardiovascular:      Rate and Rhythm: Tachycardia present.      Heart sounds: Normal heart sounds.   Pulmonary:      Effort: No respiratory distress.      Breath sounds: Normal breath sounds.   Abdominal:      General: Bowel sounds are normal.      Palpations: Abdomen is soft.      Tenderness: There is no abdominal tenderness.   Musculoskeletal:         General: No tenderness.   Skin:     General: Skin is warm and dry.      Findings: No rash.   Neurological:      General: No focal deficit present.      Mental Status: She is alert.         ED Course      Procedures            EKG Interpretation:      Interpreted by LUCIANA MCBRIDE MD, MD  Time reviewed: 0920  Symptoms at time of EKG: chest pain   Rhythm: sinus tachycardia  Rate: 111  Axis: Normal  Ectopy: none  Conduction: normal  ST Segments/ T Waves: No acute ischemic changes  Q Waves: none  Comparison to prior: Unchanged    Clinical Impression: sinus tachycardia    Results for orders placed or performed during the hospital encounter of 01/04/22   Comprehensive metabolic panel     Status: Abnormal   Result Value Ref Range    Sodium 138 133 - 144 mmol/L    Potassium 4.3 3.4 - 5.3 mmol/L    Chloride 106 94 - 109 mmol/L    Carbon Dioxide (CO2) 28 20 - 32 mmol/L    Anion Gap 4 3 - 14 mmol/L    Urea Nitrogen 13 7 - 30 mg/dL    Creatinine 0.80 0.52 - 1.04 mg/dL    Calcium 9.7 8.5 - 10.1 mg/dL    Glucose 90 70 - 99 mg/dL    Alkaline Phosphatase 91 40 - 150 U/L    AST 25 0 - 45 U/L    ALT 64 (H) 0 - 50 U/L    Protein Total 7.0 6.8 - 8.8 g/dL    Albumin 3.4 3.4 - 5.0 g/dL    Bilirubin Total 0.2 0.2 - 1.3 mg/dL    GFR Estimate >90 >60 mL/min/1.73m2   Troponin I     Status: Normal   Result Value Ref Range    Troponin I High Sensitivity 4 <54 ng/L    Nt probnp inpatient (BNP)     Status: Normal   Result Value Ref Range    N terminal Pro BNP Inpatient 24 0 - 450 pg/mL   HCG qualitative Blood     Status: Normal   Result Value Ref Range    hCG Serum Qualitative Negative Negative   Symptomatic; Unknown Influenza A/B & SARS-CoV2 (COVID-19) Virus PCR Multiplex Nasopharyngeal     Status: Normal    Specimen: Nasopharyngeal; Swab   Result Value Ref Range    Influenza A PCR Negative Negative    Influenza B PCR Negative Negative    RSV PCR Negative Negative    SARS CoV2 PCR Negative Negative, Testing sent to reference lab. Results will be returned via unsolicited result    Narrative    Testing was performed using the Xpert Xpress CoV2/Flu/RSV Assay on the [employer] GeneXpert Instrument. This test should be ordered for the detection of SARS-CoV-2 and influenza viruses in individuals who meet clinical and/or epidemiological criteria. Test performance is unknown in asymptomatic patients. This test is for in vitro diagnostic use under the FDA EUA for laboratories certified under CLIA to perform high or moderate complexity testing. This test has not been FDA cleared or approved. A negative result does not rule out the presence of PCR inhibitors in the specimen or target RNA in concentration below the limit of detection for the assay. If only one viral target is positive but coinfection with multiple targets is suspected, the sample should be re-tested with another FDA cleared, approved, or authorized test, if coinfection would change clinical management. This test was validated by the [facility]. These laboratories are certified under the Clinical  Laboratory Improvement Amendments of 1988 (CLIA-88) as qualified to perform high complexity laboratory testing.   D dimer quantitative     Status: Normal   Result Value Ref Range    D-Dimer Quantitative <0.27 0.00 - 0.50 ug/mL FEU    Narrative    This D-dimer assay is intended for use in conjunction with a  clinical pretest probability assessment model to exclude pulmonary embolism (PE) and deep venous thrombosis (DVT) in outpatients suspected of PE or DVT. The cut-off value is 0.50 ug/mL FEU.   CBC with platelets and differential     Status: Abnormal   Result Value Ref Range    WBC Count 4.7 4.0 - 11.0 10e3/uL    RBC Count 3.83 3.80 - 5.20 10e6/uL    Hemoglobin 12.8 11.7 - 15.7 g/dL    Hematocrit 37.2 35.0 - 47.0 %    MCV 97 78 - 100 fL    MCH 33.4 (H) 26.5 - 33.0 pg    MCHC 34.4 31.5 - 36.5 g/dL    RDW 13.4 10.0 - 15.0 %    Platelet Count 253 150 - 450 10e3/uL    % Neutrophils 51 %    % Lymphocytes 38 %    % Monocytes 10 %    % Eosinophils 1 %    % Basophils 0 %    % Immature Granulocytes 0 %    NRBCs per 100 WBC 0 <1 /100    Absolute Neutrophils 2.4 1.6 - 8.3 10e3/uL    Absolute Lymphocytes 1.8 0.8 - 5.3 10e3/uL    Absolute Monocytes 0.4 0.0 - 1.3 10e3/uL    Absolute Eosinophils 0.1 0.0 - 0.7 10e3/uL    Absolute Basophils 0.0 0.0 - 0.2 10e3/uL    Absolute Immature Granulocytes 0.0 <=0.4 10e3/uL    Absolute NRBCs 0.0 10e3/uL   Lipase     Status: Normal   Result Value Ref Range    Lipase 84 73 - 393 U/L   EKG 12-lead, tracing only     Status: None (Preliminary result)   Result Value Ref Range    Systolic Blood Pressure  mmHg    Diastolic Blood Pressure  mmHg    Ventricular Rate 111 BPM    Atrial Rate 111 BPM    ID Interval 116 ms    QRS Duration 80 ms     ms    QTc 437 ms    P Axis 29 degrees    R AXIS 63 degrees    T Axis 48 degrees    Interpretation ECG Sinus tachycardia  Otherwise normal ECG      CBC with platelets differential     Status: Abnormal    Narrative    The following orders were created for panel order CBC with platelets differential.  Procedure                               Abnormality         Status                     ---------                               -----------         ------                     CBC with platelets and d...[[id number]]  Abnormal            Final result                  Please view results for these tests on the individual orders.          The medical record was reviewed and interpreted.  Current labs reviewed and interpreted.  Previous labs reviewed and interpreted.  Previous images reviewed and interpreted: No acute abnormality.  EKG reviewed and interpreted: No acute ischemic change.              Results for orders placed or performed during the hospital encounter of 01/04/22   Comprehensive metabolic panel     Status: Abnormal   Result Value Ref Range    Sodium 138 133 - 144 mmol/L    Potassium 4.3 3.4 - 5.3 mmol/L    Chloride 106 94 - 109 mmol/L    Carbon Dioxide (CO2) 28 20 - 32 mmol/L    Anion Gap 4 3 - 14 mmol/L    Urea Nitrogen 13 7 - 30 mg/dL    Creatinine 0.80 0.52 - 1.04 mg/dL    Calcium 9.7 8.5 - 10.1 mg/dL    Glucose 90 70 - 99 mg/dL    Alkaline Phosphatase 91 40 - 150 U/L    AST 25 0 - 45 U/L    ALT 64 (H) 0 - 50 U/L    Protein Total 7.0 6.8 - 8.8 g/dL    Albumin 3.4 3.4 - 5.0 g/dL    Bilirubin Total 0.2 0.2 - 1.3 mg/dL    GFR Estimate >90 >60 mL/min/1.73m2   Troponin I     Status: Normal   Result Value Ref Range    Troponin I High Sensitivity 4 <54 ng/L   Nt probnp inpatient (BNP)     Status: Normal   Result Value Ref Range    N terminal Pro BNP Inpatient 24 0 - 450 pg/mL   HCG qualitative Blood     Status: Normal   Result Value Ref Range    hCG Serum Qualitative Negative Negative   Symptomatic; Unknown Influenza A/B & SARS-CoV2 (COVID-19) Virus PCR Multiplex Nasopharyngeal     Status: Normal    Specimen: Nasopharyngeal; Swab   Result Value Ref Range    Influenza A PCR Negative Negative    Influenza B PCR Negative Negative    RSV PCR Negative Negative    SARS CoV2 PCR Negative Negative, Testing sent to reference lab. Results will be returned via unsolicited result    Narrative    Testing was performed using the Xpert Xpress CoV2/Flu/RSV Assay on the [employer] Instrument. This test should be ordered for the detection of SARS-CoV-2 and influenza viruses in  individuals who meet clinical and/or epidemiological criteria. Test performance is unknown in asymptomatic patients. This test is for in vitro diagnostic use under the FDA EUA for laboratories certified under CLIA to perform high or moderate complexity testing. This test has not been FDA cleared or approved. A negative result does not rule out the presence of PCR inhibitors in the specimen or target RNA in concentration below the limit of detection for the assay. If only one viral target is positive but coinfection with multiple targets is suspected, the sample should be re-tested with another FDA cleared, approved, or authorized test, if coinfection would change clinical management. This test was validated by the [facility]. These laboratories are certified under the Clinical  Laboratory Improvement Amendments of 1988 (CLIA-88) as qualified to perform high complexity laboratory testing.   D dimer quantitative     Status: Normal   Result Value Ref Range    D-Dimer Quantitative <0.27 0.00 - 0.50 ug/mL FEU    Narrative    This D-dimer assay is intended for use in conjunction with a clinical pretest probability assessment model to exclude pulmonary embolism (PE) and deep venous thrombosis (DVT) in outpatients suspected of PE or DVT. The cut-off value is 0.50 ug/mL FEU.   CBC with platelets and differential     Status: Abnormal   Result Value Ref Range    WBC Count 4.7 4.0 - 11.0 10e3/uL    RBC Count 3.83 3.80 - 5.20 10e6/uL    Hemoglobin 12.8 11.7 - 15.7 g/dL    Hematocrit 37.2 35.0 - 47.0 %    MCV 97 78 - 100 fL    MCH 33.4 (H) 26.5 - 33.0 pg    MCHC 34.4 31.5 - 36.5 g/dL    RDW 13.4 10.0 - 15.0 %    Platelet Count 253 150 - 450 10e3/uL    % Neutrophils 51 %    % Lymphocytes 38 %    % Monocytes 10 %    % Eosinophils 1 %    % Basophils 0 %    % Immature Granulocytes 0 %    NRBCs per 100 WBC 0 <1 /100    Absolute Neutrophils 2.4 1.6 - 8.3 10e3/uL    Absolute Lymphocytes 1.8 0.8 - 5.3 10e3/uL     Absolute Monocytes 0.4 0.0 - 1.3 10e3/uL    Absolute Eosinophils 0.1 0.0 - 0.7 10e3/uL    Absolute Basophils 0.0 0.0 - 0.2 10e3/uL    Absolute Immature Granulocytes 0.0 <=0.4 10e3/uL    Absolute NRBCs 0.0 10e3/uL   Lipase     Status: Normal   Result Value Ref Range    Lipase 84 73 - 393 U/L   EKG 12-lead, tracing only     Status: None (Preliminary result)   Result Value Ref Range    Systolic Blood Pressure  mmHg    Diastolic Blood Pressure  mmHg    Ventricular Rate 111 BPM    Atrial Rate 111 BPM    [place] Interval 116 ms    QRS Duration 80 ms     ms    QTc 437 ms    P Axis 29 degrees    R AXIS 63 degrees    T Axis 48 degrees    Interpretation ECG Sinus tachycardia  Otherwise normal ECG      CBC with platelets differential     Status: Abnormal    Narrative    The following orders were created for panel order CBC with platelets differential.  Procedure                               Abnormality         Status                     ---------                               -----------         ------                     CBC with platelets and d...[[id number]]  Abnormal            Final result                 Please view results for these tests on the individual orders.     Medications   0.9% sodium chloride BOLUS (0 mLs Intravenous Stopped 1/4/22 1429)   HYDROmorphone (PF) (DILAUDID) injection 0.5 mg (0.5 mg Intravenous Given 1/4/22 1321)   diphenhydrAMINE (BENADRYL) injection 50 mg (50 mg Intravenous Given 1/4/22 1328)   ondansetron (ZOFRAN) injection 4 mg (4 mg Intravenous Given 1/4/22 1430)   HYDROmorphone (PF) (DILAUDID) injection 0.5 mg (0.5 mg Intravenous Given 1/4/22 1432)   diphenhydrAMINE (BENADRYL) injection 25 mg (25 mg Intravenous Given 1/4/22 1429)     2:27 PM Reassessed.  Feeling better.  Abdomen- soft and without focal tenderness.     reviewed.  Patient received acetaminophen with codeine prescription on 12/31.  She states that she is unable to take it due to side effects of itching and nausea.      Assessments & Plan (with Medical Decision Making)   31 year old female to the emergency department for evaluation of sharp left upper chest pain.  She also feels short of air.  She has clear lungs and normal oxygen saturations.  She has a history of DVT and is on Eliquis.  She is tachycardic but otherwise has normal vital signs.    The patient does not have any acute ischemic change in her EKG and her troponin is normal.  After 2 days of symptoms, do not suspect ACS.  Patient's D-dimer is normal so do not suspect pulmonary embolism.  Chest radiograph obtained yesterday was reviewed and is unrevealing as well.  Patient is Covid and influenza negative.  The remainder of her basic labs are unrevealing as well.  Suspect chest wall pain.  She was also concerned about hypoxia on a home oximeter but has normal oxygen saturations here in the emergency department.  The patient's evaluation here in the emergency department is reassuring.  She will be discharged home.    I have reviewed the nursing notes. I have reviewed the findings, diagnosis, plan and need for follow up with the patient.    New Prescriptions    No medications on file       Final diagnoses:   Chest pain, unspecified type       Chart documentation was completed with Dragon voice-recognition software. Even though reviewed, this chart may still contain some grammatical, spelling, and word errors.     --  Kareem Zarate Md  [facility] EMERGENCY DEPARTMENT  1/4/2022     Kareem Zarate MD  01/04/22 3034

## 2022-01-04 NOTE — TELEPHONE ENCOUNTER
Tashiahart sent Appointment has been scheduled waiting for confirmation from patient if date and time work  Please close encounter once patient responds   Thanks  Marie Cuevas RT (R)

## 2022-01-04 NOTE — TELEPHONE ENCOUNTER
Katy states that he oxygen level is going up and down and the highest is 92%.  Currently oxygen is at 87%.  Katy has a mammogram and ultrasound due tomorrow.  Patient states that she went to ED and had to leave due to a long wait.  Patient denies severe shortness of breath and has a pulse oximeter with her.  Patient states that in the state that she currently lives she uses oxygen.  Patient came to [place] without oxygen.      COVID 19 Nurse Triage Plan/Patient Instructions    Please be aware that novel coronavirus (COVID-19) may be circulating in the community. If you develop symptoms such as fever, cough, or SOB or if you have concerns about the presence of another infection including coronavirus (COVID-19), please contact your health care provider or visit [email or web address].     Disposition/Instructions    Virtual Visit with provider recommended. Reference Visit Selection Guide.    Thank you for taking steps to prevent the spread of this virus.  o Limit your contact with others.  o Wear a simple mask to cover your cough.  o Wash your hands well and often.    Resources    [facility]: About COVID-19: [email or web address]    CDC: What to Do If You're Sick: www.cdc.gov/coronavirus/2019-ncov/about/steps-when-sick.html    CDC: Ending Home Isolation: www.cdc.gov/coronavirus/2019-ncov/hcp/disposition-in-home-patients.html     CDC: Caring for Someone: www.cdc.gov/coronavirus/2019-ncov/if-you-are-sick/care-for-someone.html     [facility]: Interim Guidance for Hospital Discharge to Home: www.health.[facility].[place]/diseases/coronavirus/hcp/hospdischarge.pdf    [facility] clinical trials (COVID-19 research studies): clinicalaffairs.[facility]/umn-clinical-trials     Below are the COVID-19 hotlines at the [place] Department of Health ([facility]). Interpreters are available.   o For health questions: Call 400-514-9455 or 1-552.639.7802 (7 a.m. to 7 p.m.)  o For questions about schools and childcare: Call  [phone] or 1-215.785.8332 (7 a.m. to 7 p.m.)                       Reason for Disposition    Fever present > 3 days (72 hours)    Additional Information    Negative: SEVERE or constant chest pain or pressure (Exception: mild central chest pain, present only when coughing)    Negative: MODERATE difficulty breathing (e.g., speaks in phrases, SOB even at rest, pulse 100-120)    Negative: [1] Headache AND [2] stiff neck (can't touch chin to chest)    Negative: MILD difficulty breathing (e.g., minimal/no SOB at rest, SOB with walking, pulse <100)    Negative: Chest pain or pressure    Negative: Patient sounds very sick or weak to the triager    Negative: Fever > 103 F (39.4 C)    Negative: [1] Fever > 101 F (38.3 C) AND [2] age > 60 years    Negative: [1] Fever > 100.0 F (37.8 C) AND [2] bedridden (e.g., nursing home patient, CVA, chronic illness, recovering from surgery)    Negative: HIGH RISK for severe COVID complications (e.g., age > 64 years, obesity with BMI > 25, pregnant, chronic lung disease or other chronic medical condition)  (Exception: Already seen by PCP and no new or worsening symptoms.)    Negative: [1] HIGH RISK patient AND [2] influenza is widespread in the community AND [3] ONE OR MORE respiratory symptoms: cough, sore throat, runny or stuffy nose    Negative: [1] HIGH RISK patient AND [2] influenza exposure within the last 7 days AND [3] ONE OR MORE respiratory symptoms: cough, sore throat, runny or stuffy nose    Negative: [1] COVID-19 infection suspected by caller or triager AND [2] mild symptoms (cough, fever, or others) AND [3] negative COVID-19 rapid test    Protocols used: CORONAVIRUS (COVID-19) DIAGNOSED OR PDCHPHASK-V-QG 8.25.2021

## 2022-01-04 NOTE — TELEPHONE ENCOUNTER
Please schedule for my 10:30 slot and include my 11 am slot for use with an  this Friday if it works for the patient.    Thank you,    Dr. Ferrer

## 2022-01-05 ENCOUNTER — ANCILLARY PROCEDURE (OUTPATIENT)
Dept: MAMMOGRAPHY | Facility: CLINIC | Age: 32
End: 2022-01-05
Attending: FAMILY MEDICINE
Payer: MEDICARE

## 2022-01-05 DIAGNOSIS — R92.8 OTHER ABNORMAL AND INCONCLUSIVE FINDINGS ON DIAGNOSTIC IMAGING OF BREAST: ICD-10-CM

## 2022-01-05 DIAGNOSIS — N63.0 LUMP OR MASS IN BREAST: ICD-10-CM

## 2022-01-05 PROCEDURE — 76642 ULTRASOUND BREAST LIMITED: CPT | Mod: 50

## 2022-01-05 PROCEDURE — T1013 SIGN LANG/ORAL INTERPRETER: HCPCS | Mod: U3

## 2022-01-05 PROCEDURE — 77062 BREAST TOMOSYNTHESIS BI: CPT

## 2022-01-06 NOTE — PROGRESS NOTES
Assessment & Plan   1. Abdominal pain, generalized: Did not get significant provement with Tylenol 3.  On blood thinner and history of Crohn's as well as significant allergy list and cannot take other medications.  Short course of Norco given while present in [place].  No further tablets will be given while here.  Establish care in [place].  Work-up for cause of bleeding with colonoscopy and referral to GI for Crohn's as below.  - HYDROcodone-acetaminophen (NORCO) 5-325 MG tablet; Take 1 tablet by mouth every 6 hours as needed for severe pain  Dispense: 10 tablet; Refill: 0    2. Hematochezia: Check CBC looking for level of significant bleed.  Pain control as above.  Colonoscopy ordered.  Referral to GI placed but likely will need to be seen in [place] as she will not be here longer than 2 to 3 weeks.  - Adult Gastro Ref - Procedure Only; Future  - CBC with platelets; Future  - Basic metabolic panel  (Ca, Cl, CO2, Creat, Gluc, K, Na, BUN); Future  - HYDROcodone-acetaminophen (NORCO) 5-325 MG tablet; Take 1 tablet by mouth every 6 hours as needed for severe pain  Dispense: 10 tablet; Refill: 0    3. Crohn's disease of colon without complication (H): Colonoscopy ordered.  Referral to GI.  - Adult Gastro Ref - Procedure Only; Future    4. Mild persistent asthma without complication: Controlled.  ACT filled out.  Asthma action plan available electronically.    5. Adjustment disorder with mixed anxiety and depressed mood: Refilled for patient.  - ARIPiprazole (ABILIFY) 30 MG tablet; Take 1 tablet (30 mg) by mouth At Bedtime  Dispense: 30 tablet; Refill: 0    6. Anxiety:  - ARIPiprazole (ABILIFY) 30 MG tablet; Take 1 tablet (30 mg) by mouth At Bedtime  Dispense: 30 tablet; Refill: 0    7. High priority for 2019-nCoV vaccine: Covid Pfizer second dose given today.    8. Influenza vaccination declined      Return in about 2 weeks (around 1/21/2022).    Bacilio Adams MD  [facility]  Medicine    This chart is completed utilizing dictation software; typos and/or incorrect word substitutions may unintentionally occur.      Subjective     Katy Islas is a 31 year old female who presents to clinic today for the following health issues:      used for the duration of today's visit.    Does patient want to do medicare annual wellness physical today? no  If yes add questions in superlist.    History of Present Illness       She eats 2-3 servings of fruits and vegetables daily.She consumes 1 sweetened beverage(s) daily.She exercises with enough effort to increase her heart rate 20 to 29 minutes per day.  She exercises with enough effort to increase her heart rate 3 or less days per week. She is missing 3 dose(s) of medications per week.    Patient presents today for follow-up of right breast pain.  Has improved.  She did obtain mammogram and ultrasound as requested which shortly normal.    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  Due to rectal bleeding she was decreased on her Eliquis to 2.5 mg twice daily.  She continues to experience bilateral lower quadrant abdominal discomfort.  She denies any lightheadedness.    She does have history of Crohn's but is not followed by GI currently.    She did have a CT in [place] following the onset of her bleeding on 12/16/2021 showing colonic thickening no other obvious cause for pain.  She does have an OB/GYN visit scheduled in [place].  She is planning to be in town for 2-3 more weeks prior to departing back to [place].    She has no other questions or concerns today.    Asthma controlled with her albuterol inhaler.  ACT filled out today.    Review of Systems   Constitutional, HEENT, lymph, derm, cardiovascular, pulmonary, gi and gu systems are negative, except as otherwise noted.      Objective    /72   Pulse 91   Temp 97.7  F (36.5  C)  "(Temporal)   Ht 1.575 m (5' 2\")   Wt 90.3 kg (199 lb)   SpO2 96%   BMI 36.40 kg/m    Body mass index is 36.4 kg/m .  Physical Exam   General:  Appears well and in no acute distress.  Cardiovascular: Regular rate and rhythm, normal S1 and S2 without murmur. No extra heartsounds or friction rub. Radial pulses present and equal bilaterally.  Respiratory: Lungs clear to auscultation bilaterally. No wheezing or crackles. No prolonged expiration. Symmetrical chest rise.  Musculoskeletal: No gross extremity deformities. No peripheral edema. Normal muscle bulk.  GI/Rectal: Generalized pain to palpation of LLQ and RLQ. Otherwise soft, non-tender abdomen. No hepatosplenomegaly. Normal active bowel sounds.    Labs: Pending    Ultrasound and mammogram result:    MAMMOGRAPHIC FINDINGS: Bilateral full-field digital diagnostic mammograms performed. There are scattered areas of fibroglandular density. Images evaluated with the assistance of CAD. Breast tomosynthesis was used in interpretation. 1 BB marker was placed   in in the right lower inner quadrant and 3 BB markers were placed on the left breast in the locations where the patient had tender lumps. There are no findings to suggest malignancy.     ULTRASOUND FINDINGS: Breast ultrasound was performed by both ultrasonographer and the radiologist in the locations of pain and lumps as identified by the patient. This was in the left upper outer quadrant, in the left breast from the 5:00 through the   9:00 position, and in the right breast from 3:00 through the 6:00 position. Only normal-appearing breast tissue was seen. There were no findings to suggest malignancy.    IMPRESSION:   1.  No findings to suggest malignancy. No cause for the patient's pain and lumps is identified and follow-up should be based on clinical findings.     ACR BI-RADS Category 1: Negative.          Answers for HPI/ROS submitted by the patient on 1/7/2022  If you checked off any problems, how difficult " have these problems made it for you to do your work, take care of things at home, or get along with other people?: Somewhat difficult  PHQ9 TOTAL SCORE: 3  JULIETH 7 TOTAL SCORE: 2

## 2022-01-07 ENCOUNTER — TELEPHONE (OUTPATIENT)
Dept: GASTROENTEROLOGY | Facility: CLINIC | Age: 32
End: 2022-01-07

## 2022-01-07 ENCOUNTER — TELEPHONE (OUTPATIENT)
Dept: FAMILY MEDICINE | Facility: CLINIC | Age: 32
End: 2022-01-07

## 2022-01-07 ENCOUNTER — OFFICE VISIT (OUTPATIENT)
Dept: FAMILY MEDICINE | Facility: CLINIC | Age: 32
End: 2022-01-07
Payer: MEDICARE

## 2022-01-07 VITALS
BODY MASS INDEX: 36.62 KG/M2 | HEART RATE: 91 BPM | WEIGHT: 199 LBS | OXYGEN SATURATION: 96 % | DIASTOLIC BLOOD PRESSURE: 72 MMHG | TEMPERATURE: 97.7 F | HEIGHT: 62 IN | SYSTOLIC BLOOD PRESSURE: 128 MMHG

## 2022-01-07 DIAGNOSIS — K50.10 CROHN'S DISEASE OF COLON WITHOUT COMPLICATION (H): ICD-10-CM

## 2022-01-07 DIAGNOSIS — F41.9 ANXIETY: ICD-10-CM

## 2022-01-07 DIAGNOSIS — Z28.21 INFLUENZA VACCINATION DECLINED: ICD-10-CM

## 2022-01-07 DIAGNOSIS — Z11.59 ENCOUNTER FOR SCREENING FOR OTHER VIRAL DISEASES: Primary | ICD-10-CM

## 2022-01-07 DIAGNOSIS — F43.23 ADJUSTMENT DISORDER WITH MIXED ANXIETY AND DEPRESSED MOOD: ICD-10-CM

## 2022-01-07 DIAGNOSIS — R10.84 ABDOMINAL PAIN, GENERALIZED: Primary | ICD-10-CM

## 2022-01-07 DIAGNOSIS — J45.30 MILD PERSISTENT ASTHMA WITHOUT COMPLICATION: ICD-10-CM

## 2022-01-07 DIAGNOSIS — K92.1 HEMATOCHEZIA: ICD-10-CM

## 2022-01-07 DIAGNOSIS — Z23 HIGH PRIORITY FOR 2019-NCOV VACCINE: ICD-10-CM

## 2022-01-07 LAB
ANION GAP SERPL CALCULATED.3IONS-SCNC: 4 MMOL/L (ref 3–14)
BUN SERPL-MCNC: 18 MG/DL (ref 7–30)
CALCIUM SERPL-MCNC: 9.1 MG/DL (ref 8.5–10.1)
CHLORIDE BLD-SCNC: 105 MMOL/L (ref 94–109)
CO2 SERPL-SCNC: 30 MMOL/L (ref 20–32)
CREAT SERPL-MCNC: 0.86 MG/DL (ref 0.52–1.04)
ERYTHROCYTE [DISTWIDTH] IN BLOOD BY AUTOMATED COUNT: 13.4 % (ref 10–15)
GFR SERPL CREATININE-BSD FRML MDRD: >90 ML/MIN/1.73M2
GLUCOSE BLD-MCNC: 88 MG/DL (ref 70–99)
HCT VFR BLD AUTO: 36.5 % (ref 35–47)
HGB BLD-MCNC: 12.3 G/DL (ref 11.7–15.7)
MCH RBC QN AUTO: 33 PG (ref 26.5–33)
MCHC RBC AUTO-ENTMCNC: 33.7 G/DL (ref 31.5–36.5)
MCV RBC AUTO: 98 FL (ref 78–100)
PLATELET # BLD AUTO: 267 10E3/UL (ref 150–450)
POTASSIUM BLD-SCNC: 4.6 MMOL/L (ref 3.4–5.3)
RBC # BLD AUTO: 3.73 10E6/UL (ref 3.8–5.2)
SODIUM SERPL-SCNC: 139 MMOL/L (ref 133–144)
WBC # BLD AUTO: 4.7 10E3/UL (ref 4–11)

## 2022-01-07 PROCEDURE — 36415 COLL VENOUS BLD VENIPUNCTURE: CPT | Performed by: FAMILY MEDICINE

## 2022-01-07 PROCEDURE — 80048 BASIC METABOLIC PNL TOTAL CA: CPT | Performed by: FAMILY MEDICINE

## 2022-01-07 PROCEDURE — 99214 OFFICE O/P EST MOD 30 MIN: CPT | Mod: 25 | Performed by: FAMILY MEDICINE

## 2022-01-07 PROCEDURE — 85027 COMPLETE CBC AUTOMATED: CPT | Performed by: FAMILY MEDICINE

## 2022-01-07 PROCEDURE — 0002A COVID-19,PF,PFIZER (12+ YRS): CPT | Performed by: FAMILY MEDICINE

## 2022-01-07 PROCEDURE — 91300 COVID-19,PF,PFIZER (12+ YRS): CPT | Performed by: FAMILY MEDICINE

## 2022-01-07 RX ORDER — ARIPIPRAZOLE 30 MG/1
30 TABLET ORAL AT BEDTIME
Qty: 30 TABLET | Refills: 0 | Status: SHIPPED | OUTPATIENT
Start: 2022-01-07 | End: 2022-01-26

## 2022-01-07 RX ORDER — HYDROCODONE BITARTRATE AND ACETAMINOPHEN 5; 325 MG/1; MG/1
1 TABLET ORAL EVERY 6 HOURS PRN
Qty: 10 TABLET | Refills: 0 | Status: SHIPPED | OUTPATIENT
Start: 2022-01-07 | End: 2022-01-18

## 2022-01-07 ASSESSMENT — ANXIETY QUESTIONNAIRES
7. FEELING AFRAID AS IF SOMETHING AWFUL MIGHT HAPPEN: NOT AT ALL
GAD7 TOTAL SCORE: 2
3. WORRYING TOO MUCH ABOUT DIFFERENT THINGS: NOT AT ALL
5. BEING SO RESTLESS THAT IT IS HARD TO SIT STILL: NOT AT ALL
6. BECOMING EASILY ANNOYED OR IRRITABLE: SEVERAL DAYS
7. FEELING AFRAID AS IF SOMETHING AWFUL MIGHT HAPPEN: NOT AT ALL
GAD7 TOTAL SCORE: 2
1. FEELING NERVOUS, ANXIOUS, OR ON EDGE: SEVERAL DAYS
4. TROUBLE RELAXING: NOT AT ALL
GAD7 TOTAL SCORE: 2
2. NOT BEING ABLE TO STOP OR CONTROL WORRYING: NOT AT ALL

## 2022-01-07 ASSESSMENT — ASTHMA QUESTIONNAIRES
ACT_TOTALSCORE: 24
QUESTION_1 LAST FOUR WEEKS HOW MUCH OF THE TIME DID YOUR ASTHMA KEEP YOU FROM GETTING AS MUCH DONE AT WORK, SCHOOL OR AT HOME: NONE OF THE TIME
QUESTION_4 LAST FOUR WEEKS HOW OFTEN HAVE YOU USED YOUR RESCUE INHALER OR NEBULIZER MEDICATION (SUCH AS ALBUTEROL): ONCE A WEEK OR LESS
QUESTION_2 LAST FOUR WEEKS HOW OFTEN HAVE YOU HAD SHORTNESS OF BREATH: NOT AT ALL
QUESTION_5 LAST FOUR WEEKS HOW WOULD YOU RATE YOUR ASTHMA CONTROL: COMPLETELY CONTROLLED
QUESTION_3 LAST FOUR WEEKS HOW OFTEN DID YOUR ASTHMA SYMPTOMS (WHEEZING, COUGHING, SHORTNESS OF BREATH, CHEST TIGHTNESS OR PAIN) WAKE YOU UP AT NIGHT OR EARLIER THAN USUAL IN THE MORNING: NOT AT ALL

## 2022-01-07 ASSESSMENT — PATIENT HEALTH QUESTIONNAIRE - PHQ9
SUM OF ALL RESPONSES TO PHQ QUESTIONS 1-9: 3
10. IF YOU CHECKED OFF ANY PROBLEMS, HOW DIFFICULT HAVE THESE PROBLEMS MADE IT FOR YOU TO DO YOUR WORK, TAKE CARE OF THINGS AT HOME, OR GET ALONG WITH OTHER PEOPLE: SOMEWHAT DIFFICULT
SUM OF ALL RESPONSES TO PHQ QUESTIONS 1-9: 3

## 2022-01-07 ASSESSMENT — PAIN SCALES - GENERAL: PAINLEVEL: SEVERE PAIN (6)

## 2022-01-07 ASSESSMENT — MIFFLIN-ST. JEOR: SCORE: 1565.91

## 2022-01-07 NOTE — RESULT ENCOUNTER NOTE
Please inform of results if patient has not viewed in [place].    Your kidney and liver function lab results came back normal.    Please call the clinic with any questions you may have.     Have a great day,    Dr. Ferrer

## 2022-01-07 NOTE — TELEPHONE ENCOUNTER
Spoke with Gastro department, they will reach out to patient and assist in scheduling     Postponing encounter to make sure that she has been scheduled, can close once done   Thanks  Marie Cuevas RT (R)

## 2022-01-07 NOTE — TELEPHONE ENCOUNTER
Patient needs help scheduling colonoscopy, needs     Patient would like to have done in the next 2 weeks if possible before she leaves for [place]    Patient is currently living in [facility] would take and time any day asap.

## 2022-01-07 NOTE — RESULT ENCOUNTER NOTE
Please inform of results if patient has not viewed in [employer].    Your cell count lab results came back normal. You have not lost a significant amount of blood.    Please call the clinic with any questions you may have.     Have a great day,    Dr. Ferrer

## 2022-01-07 NOTE — LETTER
My Asthma Action Plan    Name: Katy Islas   YOB: 1990  Date: 1/7/2022   My doctor: Bacilio Adams MD   My clinic: [facility]        My Control Medicine: None  My Rescue Medicine: Albuterol (Proair/Ventolin/Proventil HFA) 2-4 puffs EVERY 4 HOURS as needed. Use a spacer if recommended by your provider.   My Asthma Severity:   Mild Persistent  Know your asthma triggers:   None            GREEN ZONE   Good Control    I feel good    No cough or wheeze    Can work, sleep and play without asthma symptoms       Take your asthma control medicine every day.     1. If exercise triggers your asthma, take your rescue medication    15 minutes before exercise or sports, and    During exercise if you have asthma symptoms  2. Spacer to use with inhaler: If you have a spacer, make sure to use it with your inhaler             YELLOW ZONE Getting Worse  I have ANY of these:    I do not feel good    Cough or wheeze    Chest feels tight    Wake up at night   1. Keep taking your Green Zone medications  2. Start taking your rescue medicine:    every 20 minutes for up to 1 hour. Then every 4 hours for 24-48 hours.  3. If you stay in the Yellow Zone for more than 12-24 hours, contact your doctor.  4. If you do not return to the Green Zone in 12-24 hours or you get worse, start taking your oral steroid medicine if prescribed by your provider.           RED ZONE Medical Alert - Get Help  I have ANY of these:    I feel awful    Medicine is not helping    Breathing getting harder    Trouble walking or talking    Nose opens wide to breathe       1. Take your rescue medicine NOW  2. If your provider has prescribed an oral steroid medicine, start taking it NOW  3. Call your doctor NOW  4. If you are still in the Red Zone after 20 minutes and you have not reached your doctor:    Take your rescue medicine again and    Call 911 or go to the emergency room right away    See your regular doctor within  2 weeks of an Emergency Room or Urgent Care visit for follow-up treatment.          Annual Reminders:  Meet with Asthma Educator,  Flu Shot in the Fall, consider Pneumonia Vaccination for patients with asthma (aged 19 and older).    Pharmacy:    [facility] - [place], [place] - [address] [facility] - [place], [place] - [address]  [id number] [address] [id number] - [place], [place] - [address]  [id number] [place], [place] - [address] AT [facility]    Electronically signed by Bacilio Adams MD   Date: 01/07/22                      Asthma Triggers  How To Control Things That Make Your Asthma Worse    Triggers are things that make your asthma worse.  Look at the list below to help you find your triggers and what you can do about them.  You can help prevent asthma flare-ups by staying away from your triggers.      Trigger                                                          What you can do   Cigarette Smoke  Tobacco smoke can make asthma worse. Do not allow smoking in your home, car or around you.  Be sure no one smokes at a child s day care or school.  If you smoke, ask your health care provider for ways to help you quit.  Ask family members to quit too.  Ask your health care provider for a referral to Quit Plan to help you quit smoking, or call 4-500-676-PLAN.     Colds, Flu, Bronchitis  These are common triggers of asthma. Wash your hands often.  Don t touch your eyes, nose or mouth.  Get a flu shot every year.     Dust Mites  These are tiny bugs that live in cloth or carpet. They are too small to see. Wash sheets and blankets in hot water every week.   Encase pillows and mattress in dust mite proof covers.  Avoid having carpet if you can. If you have carpet, vacuum weekly.   Use a dust mask and HEPA vacuum.   Pollen and Outdoor  Mold  Some people are allergic to trees, grass, or weed pollen, or molds. Try to keep your windows closed.  Limit time out doors when pollen count is high.   Ask you health care provider about taking medicine during allergy season.     Animal Dander  Some people are allergic to skin flakes, urine or saliva from pets with fur or feathers. Keep pets with fur or feathers out of your home.    If you can t keep the pet outdoors, then keep the pet out of your bedroom.  Keep the bedroom door closed.  Keep pets off cloth furniture and away from stuffed toys.     Mice, Rats, and Cockroaches   Some people are allergic to the waste from these pests.   Cover food and garbage.  Clean up spills and food crumbs.  Store grease in the refrigerator.   Keep food out of the bedroom.   Indoor Mold  This can be a trigger if your home has high moisture. Fix leaking faucets, pipes, or other sources of water.   Clean moldy surfaces.  Dehumidify basement if it is damp and smelly.   Smoke, Strong Odors, and Sprays  These can reduce air quality. Stay away from strong odors and sprays, such as perfume, powder, hair spray, paints, smoke incense, paint, cleaning products, candles and new carpet.   Exercise or Sports  Some people with asthma have this trigger. Be active!  Ask your doctor about taking medicine before sports or exercise to prevent symptoms.    Warm up for 5-10 minutes before and after sports or exercise.     Other Triggers of Asthma  Cold air:  Cover your nose and mouth with a scarf.  Sometimes laughing or crying can be a trigger.  Some medicines and food can trigger asthma.

## 2022-01-07 NOTE — TELEPHONE ENCOUNTER
Screening Questions  1. Are you active on mychart? Y    2. What insurance is in the chart? MEDICARE     2.  Ordering/Referring Provider: LEO SALAS    3. BMI 36.40, If greater than 40 review exclusion criteria also will need EXTENDED PREP    4.  Respiratory Screening (If yes to any of the following HOSPITAL setting only):     Do you use daily home oxygen? Y  Do you have mod to severe Obstructive Sleep Apnea? N   Do you have Pulmonary Hypertension? N   Do you have UNCONTROLLED asthma? N    5. Have you had a heart or lung transplant? N  (If yes, please review exclusion criteria)    6. Are you currently on dialysis?N  (If yes, schedule in HOSPITAL setting only)(If yes, please send Golytely prep)    7. Do you have chronic kidney disease? N (If yes, please send Golytely prep)    7. Have you had a stroke or Transient ischemic attack (TIA) within 6 months? N (If yes, do not schedule at [facility])    8. In the past 6 months, have you had any heart related issues including cardiomyopathy or heart attack? N (If yes, please review exclusion criteria)           If yes, did it require cardiac stenting or other implantable device?  (If yes, please review exclusion criteria)      9. Do you have any implantable devices in your body (pacemaker, defib, LVAD)? N (If yes, schedule at U)    10. Do you take nitroglycerin? If yes, how often? N (if yes, schedule at HOSPITAL setting)    11. Are you currently taking any blood thinners? Eliquis (If yes- inform patient to follow up with PCP or provider for follow up instructions)     12. Are you a diabetic? N (If yes, please send Golytely prep)    13. (Females) Are you currently pregnant? N  If yes, how many weeks?      15. Are you taking any prescription pain medications on a routine schedule? N If yes, MAC sedation and patient will need EXTENDED PREP.    16. Do you have any chemical dependencies such as alcohol, street drugs, or methadone? N If yes, MAC sedation and patient will  need EXTENDED PREP    17. Do you have any history of post-traumatic stress syndrome, severe anxiety or history of psychosis? PTSD, ANXIETY,DEPRESSION, PERSONALITY DISORDER  If yes, MAC sedation.     18. Do you transfer independently? Y    19.  Do you have any issues with constipation? N   If yes, pt will need EXTENDED PREP     20. Preferred Pharmacy for Pre Prescription [id number] - [place], [place] - [address] AT [facility]    Scheduling Details    Which Colonoscopy Prep was Sent?: MPREP  Type of Procedure Scheduled: COLONOSCOPY  Surgeon: GUICHO  Date of Procedure: 1/11/2022  Location: [facility]  Caller (Please ask for phone number if not scheduled by patient): Katy Islas        Sedation Type: MAC  Conscious Sedation- Needs  for 6 hours after the procedure  MAC/General-Needs  for 24 hours after procedure    Pre-op Required at [facility], [place], [place] and OR for MAC sedation:   (if yes advise patient they will need a pre-op prior to procedure)      Informed patient they will need an adult  Y  Cannot take any type of public or medical transportation alone    Pre-Procedure Covid test to be completed at [facility] or Externally: EXTERNAL    Confirmed Nurse will call to complete assessment Y    Additional comments:  ([facility] PATIENTS NEED EXTENDED PREP)

## 2022-01-08 ENCOUNTER — LAB (OUTPATIENT)
Dept: LAB | Facility: CLINIC | Age: 32
End: 2022-01-08
Attending: INTERNAL MEDICINE
Payer: MEDICARE

## 2022-01-08 DIAGNOSIS — Z11.59 ENCOUNTER FOR SCREENING FOR OTHER VIRAL DISEASES: ICD-10-CM

## 2022-01-08 PROCEDURE — U0005 INFEC AGEN DETEC AMPLI PROBE: HCPCS

## 2022-01-08 PROCEDURE — U0003 INFECTIOUS AGENT DETECTION BY NUCLEIC ACID (DNA OR RNA); SEVERE ACUTE RESPIRATORY SYNDROME CORONAVIRUS 2 (SARS-COV-2) (CORONAVIRUS DISEASE [COVID-19]), AMPLIFIED PROBE TECHNIQUE, MAKING USE OF HIGH THROUGHPUT TECHNOLOGIES AS DESCRIBED BY CMS-2020-01-R: HCPCS

## 2022-01-08 ASSESSMENT — PATIENT HEALTH QUESTIONNAIRE - PHQ9: SUM OF ALL RESPONSES TO PHQ QUESTIONS 1-9: 3

## 2022-01-08 ASSESSMENT — ANXIETY QUESTIONNAIRES: GAD7 TOTAL SCORE: 2

## 2022-01-08 ASSESSMENT — ASTHMA QUESTIONNAIRES: ACT_TOTALSCORE: 24

## 2022-01-10 ENCOUNTER — TELEPHONE (OUTPATIENT)
Dept: GASTROENTEROLOGY | Facility: CLINIC | Age: 32
End: 2022-01-10
Payer: MEDICARE

## 2022-01-10 DIAGNOSIS — K50.113 CROHN'S DISEASE OF COLON WITH FISTULA (H): Primary | ICD-10-CM

## 2022-01-10 LAB — SARS-COV-2 RNA RESP QL NAA+PROBE: NEGATIVE

## 2022-01-10 RX ORDER — BISACODYL 5 MG/1
TABLET, DELAYED RELEASE ORAL
Qty: 2 TABLET | Refills: 0 | Status: SHIPPED | OUTPATIENT
Start: 2022-01-10 | End: 2022-01-31

## 2022-01-10 RX ORDER — POLYETHYLENE GLYCOL 3350 17 G/17G
POWDER, FOR SOLUTION ORAL
Qty: 476 G | Refills: 0 | Status: SHIPPED | OUTPATIENT
Start: 2022-01-10 | End: 2022-02-09

## 2022-01-10 NOTE — TELEPHONE ENCOUNTER
Returned pt's call.    No answer. Left message to return call [phone] #2    Soha Rey RN

## 2022-01-10 NOTE — TELEPHONE ENCOUNTER
"Per [employer] message received from patient:  \"Hi I just got your message. [facility] I stopped taking my blood thinner medicine. Please call or email if need anything. Thank you and see you tomorrow.\"    Loida Kruse RN       "

## 2022-01-10 NOTE — TELEPHONE ENCOUNTER
Call with [employer].    Attempted to contact patient regarding upcoming colonoscopy procedure on 1.11.2022 for pre assessment questions. No answer.     Left message to return call to [phone] #2    Covid test?    Pre op?    Arrival time: 1045    Facility location: [facility]    Sedation type: MAC    Indication for procedure: IBD, Crohn's colitis    Anticoagulants: Eliquis. Holding interval of 2 days    Bowel prep recommendation: Verify bowel habits.  Per med list pt has dilaudid PRN, hydrocodone PRN, miralax PRN.      Soha Rey RN

## 2022-01-10 NOTE — TELEPHONE ENCOUNTER
Pre assessment questions completed for upcoming colonoscopy procedure scheduled on 1/11/22    COVID test scheduled 1/9/22 - in process    Pre-op scheduled 1/7/22 - Sridhar Avalos with Dr. Carissa Light    Reviewed procedural arrival time 1045 and facility location [facility].    Designated  policy reviewed. Instructed to have someone stay 24 hours post procedure.     Bowel prep recommendation: double miralax d/t narcotic use.     Double Miralax prep script sent to [id number] - [place], [place] - [address] AT [facility]. Prep instructions sent via "[employer]".    Reviewed double miralax prep instructions with patient. Patient advised to call back endoscopy team after reading "[employer]" instructions if they have any questions.     Anticoagulation/blood thinners? Eliquis.    Writer did not discuss holding of blood thinners. Left a message for patient to return call.       Loida Kruse RN

## 2022-01-11 ENCOUNTER — OFFICE VISIT (OUTPATIENT)
Dept: INTERPRETER SERVICES | Facility: CLINIC | Age: 32
End: 2022-01-11
Payer: MEDICARE

## 2022-01-11 ENCOUNTER — HOSPITAL ENCOUNTER (OUTPATIENT)
Facility: CLINIC | Age: 32
Discharge: HOME | End: 2022-01-11
Attending: INTERNAL MEDICINE | Admitting: INTERNAL MEDICINE
Payer: MEDICARE

## 2022-01-11 ENCOUNTER — ANESTHESIA (OUTPATIENT)
Dept: GASTROENTEROLOGY | Facility: CLINIC | Age: 32
End: 2022-01-11
Payer: MEDICARE

## 2022-01-11 ENCOUNTER — ANESTHESIA EVENT (OUTPATIENT)
Dept: GASTROENTEROLOGY | Facility: CLINIC | Age: 32
End: 2022-01-11
Payer: MEDICARE

## 2022-01-11 VITALS
OXYGEN SATURATION: 95 % | HEART RATE: 76 BPM | TEMPERATURE: 98 F | RESPIRATION RATE: 14 BRPM | HEIGHT: 62 IN | WEIGHT: 191.36 LBS | DIASTOLIC BLOOD PRESSURE: 86 MMHG | BODY MASS INDEX: 35.21 KG/M2 | SYSTOLIC BLOOD PRESSURE: 110 MMHG

## 2022-01-11 LAB — COLONOSCOPY: NORMAL

## 2022-01-11 PROCEDURE — T1013 SIGN LANG/ORAL INTERPRETER: HCPCS | Mod: U3

## 2022-01-11 PROCEDURE — 250N000009 HC RX 250: Performed by: STUDENT IN AN ORGANIZED HEALTH CARE EDUCATION/TRAINING PROGRAM

## 2022-01-11 PROCEDURE — 250N000011 HC RX IP 250 OP 636: Performed by: STUDENT IN AN ORGANIZED HEALTH CARE EDUCATION/TRAINING PROGRAM

## 2022-01-11 PROCEDURE — 88305 TISSUE EXAM BY PATHOLOGIST: CPT | Mod: TC | Performed by: INTERNAL MEDICINE

## 2022-01-11 PROCEDURE — 45380 COLONOSCOPY AND BIOPSY: CPT | Performed by: INTERNAL MEDICINE

## 2022-01-11 PROCEDURE — 370N000017 HC ANESTHESIA TECHNICAL FEE, PER MIN: Performed by: INTERNAL MEDICINE

## 2022-01-11 PROCEDURE — 258N000003 HC RX IP 258 OP 636: Performed by: STUDENT IN AN ORGANIZED HEALTH CARE EDUCATION/TRAINING PROGRAM

## 2022-01-11 RX ORDER — PROCHLORPERAZINE MALEATE 10 MG
10 TABLET ORAL EVERY 6 HOURS PRN
Status: DISCONTINUED | OUTPATIENT
Start: 2022-01-11 | End: 2022-01-11 | Stop reason: HOSPADM

## 2022-01-11 RX ORDER — HALOPERIDOL 5 MG/ML
1 INJECTION INTRAMUSCULAR
Status: DISCONTINUED | OUTPATIENT
Start: 2022-01-11 | End: 2022-01-11 | Stop reason: HOSPADM

## 2022-01-11 RX ORDER — NALOXONE HYDROCHLORIDE 0.4 MG/ML
0.2 INJECTION, SOLUTION INTRAMUSCULAR; INTRAVENOUS; SUBCUTANEOUS
Status: DISCONTINUED | OUTPATIENT
Start: 2022-01-11 | End: 2022-01-11 | Stop reason: HOSPADM

## 2022-01-11 RX ORDER — LIDOCAINE 40 MG/G
CREAM TOPICAL
Status: DISCONTINUED | OUTPATIENT
Start: 2022-01-11 | End: 2022-01-11 | Stop reason: HOSPADM

## 2022-01-11 RX ORDER — LIDOCAINE HYDROCHLORIDE 20 MG/ML
INJECTION, SOLUTION INFILTRATION; PERINEURAL PRN
Status: DISCONTINUED | OUTPATIENT
Start: 2022-01-11 | End: 2022-01-11

## 2022-01-11 RX ORDER — SODIUM CHLORIDE, SODIUM LACTATE, POTASSIUM CHLORIDE, CALCIUM CHLORIDE 600; 310; 30; 20 MG/100ML; MG/100ML; MG/100ML; MG/100ML
INJECTION, SOLUTION INTRAVENOUS CONTINUOUS PRN
Status: DISCONTINUED | OUTPATIENT
Start: 2022-01-11 | End: 2022-01-11

## 2022-01-11 RX ORDER — SODIUM CHLORIDE, SODIUM LACTATE, POTASSIUM CHLORIDE, CALCIUM CHLORIDE 600; 310; 30; 20 MG/100ML; MG/100ML; MG/100ML; MG/100ML
INJECTION, SOLUTION INTRAVENOUS CONTINUOUS
Status: DISCONTINUED | OUTPATIENT
Start: 2022-01-11 | End: 2022-01-11 | Stop reason: HOSPADM

## 2022-01-11 RX ORDER — NALOXONE HYDROCHLORIDE 0.4 MG/ML
0.4 INJECTION, SOLUTION INTRAMUSCULAR; INTRAVENOUS; SUBCUTANEOUS
Status: DISCONTINUED | OUTPATIENT
Start: 2022-01-11 | End: 2022-01-11 | Stop reason: HOSPADM

## 2022-01-11 RX ORDER — PROPOFOL 10 MG/ML
INJECTION, EMULSION INTRAVENOUS CONTINUOUS PRN
Status: DISCONTINUED | OUTPATIENT
Start: 2022-01-11 | End: 2022-01-11

## 2022-01-11 RX ORDER — OXYCODONE HYDROCHLORIDE 5 MG/1
5 TABLET ORAL EVERY 4 HOURS PRN
Status: DISCONTINUED | OUTPATIENT
Start: 2022-01-11 | End: 2022-01-11 | Stop reason: HOSPADM

## 2022-01-11 RX ORDER — DIMENHYDRINATE 50 MG/ML
25 INJECTION, SOLUTION INTRAMUSCULAR; INTRAVENOUS
Status: DISCONTINUED | OUTPATIENT
Start: 2022-01-11 | End: 2022-01-11 | Stop reason: HOSPADM

## 2022-01-11 RX ORDER — MEPERIDINE HYDROCHLORIDE 25 MG/ML
12.5 INJECTION INTRAMUSCULAR; INTRAVENOUS; SUBCUTANEOUS
Status: DISCONTINUED | OUTPATIENT
Start: 2022-01-11 | End: 2022-01-11 | Stop reason: HOSPADM

## 2022-01-11 RX ORDER — FLUMAZENIL 0.1 MG/ML
0.2 INJECTION, SOLUTION INTRAVENOUS
Status: DISCONTINUED | OUTPATIENT
Start: 2022-01-11 | End: 2022-01-11 | Stop reason: HOSPADM

## 2022-01-11 RX ORDER — FENTANYL CITRATE 50 UG/ML
25 INJECTION, SOLUTION INTRAMUSCULAR; INTRAVENOUS
Status: DISCONTINUED | OUTPATIENT
Start: 2022-01-11 | End: 2022-01-11 | Stop reason: HOSPADM

## 2022-01-11 RX ORDER — PROPOFOL 10 MG/ML
INJECTION, EMULSION INTRAVENOUS PRN
Status: DISCONTINUED | OUTPATIENT
Start: 2022-01-11 | End: 2022-01-11

## 2022-01-11 RX ORDER — ONDANSETRON 4 MG/1
4 TABLET, ORALLY DISINTEGRATING ORAL EVERY 30 MIN PRN
Status: DISCONTINUED | OUTPATIENT
Start: 2022-01-11 | End: 2022-01-11 | Stop reason: HOSPADM

## 2022-01-11 RX ORDER — ONDANSETRON 2 MG/ML
4 INJECTION INTRAMUSCULAR; INTRAVENOUS EVERY 6 HOURS PRN
Status: DISCONTINUED | OUTPATIENT
Start: 2022-01-11 | End: 2022-01-11 | Stop reason: HOSPADM

## 2022-01-11 RX ORDER — FENTANYL CITRATE 50 UG/ML
25 INJECTION, SOLUTION INTRAMUSCULAR; INTRAVENOUS EVERY 5 MIN PRN
Status: CANCELLED | OUTPATIENT
Start: 2022-01-11

## 2022-01-11 RX ORDER — ONDANSETRON 2 MG/ML
4 INJECTION INTRAMUSCULAR; INTRAVENOUS
Status: DISCONTINUED | OUTPATIENT
Start: 2022-01-11 | End: 2022-01-11 | Stop reason: HOSPADM

## 2022-01-11 RX ORDER — HYDROMORPHONE HCL IN WATER/PF 6 MG/30 ML
0.2 PATIENT CONTROLLED ANALGESIA SYRINGE INTRAVENOUS EVERY 5 MIN PRN
Status: CANCELLED | OUTPATIENT
Start: 2022-01-11

## 2022-01-11 RX ORDER — ONDANSETRON 4 MG/1
4 TABLET, ORALLY DISINTEGRATING ORAL EVERY 6 HOURS PRN
Status: DISCONTINUED | OUTPATIENT
Start: 2022-01-11 | End: 2022-01-11 | Stop reason: HOSPADM

## 2022-01-11 RX ORDER — ONDANSETRON 2 MG/ML
4 INJECTION INTRAMUSCULAR; INTRAVENOUS EVERY 30 MIN PRN
Status: DISCONTINUED | OUTPATIENT
Start: 2022-01-11 | End: 2022-01-11 | Stop reason: HOSPADM

## 2022-01-11 RX ADMIN — PROPOFOL 20 MG: 10 INJECTION, EMULSION INTRAVENOUS at 12:30

## 2022-01-11 RX ADMIN — SODIUM CHLORIDE, POTASSIUM CHLORIDE, SODIUM LACTATE AND CALCIUM CHLORIDE: 600; 310; 30; 20 INJECTION, SOLUTION INTRAVENOUS at 12:21

## 2022-01-11 RX ADMIN — PROPOFOL 20 MG: 10 INJECTION, EMULSION INTRAVENOUS at 12:41

## 2022-01-11 RX ADMIN — LIDOCAINE HYDROCHLORIDE 40 MG: 20 INJECTION, SOLUTION INFILTRATION; PERINEURAL at 12:23

## 2022-01-11 RX ADMIN — PROPOFOL 150 MCG/KG/MIN: 10 INJECTION, EMULSION INTRAVENOUS at 12:25

## 2022-01-11 RX ADMIN — MIDAZOLAM 2 MG: 1 INJECTION INTRAMUSCULAR; INTRAVENOUS at 12:21

## 2022-01-11 RX ADMIN — LIDOCAINE HYDROCHLORIDE 60 MG: 20 INJECTION, SOLUTION INFILTRATION; PERINEURAL at 12:21

## 2022-01-11 ASSESSMENT — MIFFLIN-ST. JEOR: SCORE: 1531.25

## 2022-01-11 ASSESSMENT — LIFESTYLE VARIABLES: TOBACCO_USE: 1

## 2022-01-11 NOTE — ANESTHESIA POSTPROCEDURE EVALUATION
Patient: Katy Islas    Procedure: Procedure(s):  COLONOSCOPY, WITH POLYPECTOMY AND BIOPSY       Diagnosis:Crohn's disease of colon without complication (H) [K50.10]  Diagnosis Additional Information: No value filed.    Anesthesia Type:  MAC    Note:  Disposition: Outpatient   Postop Pain Control: Uneventful            Sign Out: Well controlled pain   PONV: No   Neuro/Psych: Uneventful            Sign Out: Acceptable/Baseline neuro status   Airway/Respiratory: Uneventful            Sign Out: Acceptable/Baseline resp. status   CV/Hemodynamics: Uneventful            Sign Out: Acceptable CV status; No obvious hypovolemia; No obvious fluid overload   Other NRE: NONE   DID A NON-ROUTINE EVENT OCCUR? No           Last vitals:  Vitals Value Taken Time   /86 01/11/22 1350   Temp     Pulse 76 01/11/22 1350   Resp     SpO2 95 % 01/11/22 1400   Vitals shown include unvalidated device data.    Electronically Signed By: Marcie Orellana MD  January 11, 2022  2:53 PM

## 2022-01-11 NOTE — OR NURSING
Pt had colonoscopy with biopsy under MAC. Pt tolerated procedure well.  present for cares while patient awake. Pt stable at time of transfer to 3C recovery, report given to 3C MAUREEN Zavala.

## 2022-01-11 NOTE — ANESTHESIA PREPROCEDURE EVALUATION
Anesthesia Pre-Procedure Evaluation    Patient: Katy Islas   MRN: [id number] : 1990        Preoperative Diagnosis: Crohn's disease of colon without complication (H) [K50.10]    Procedure : Procedure(s):  COLONOSCOPY          Past Medical History:   Diagnosis Date     Abdominal pain 10/31- 2005    [facility] admit for [facility]'s     Allergic rhinitis, cause unspecified     Allergic rhinitis     Arthritis      C. difficile diarrhea     Past, no current diarrhea.     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, without mention of intractable epilepsy     pseudoseizures diagnosed after extensive neurologic eval     Migraine 12    D/C 12-[facility]     Migraine, unspecified, without mention of intractable migraine without mention of status migrainosus     Migraine     Mild intermittent asthma     mild intermittent     Mycoplasma infection in conditions classified elsewhere and of unspecified site      Other chronic pain     Back pain for 6 years     Renal disease     Kidney stones     Unspecified hearing loss     congenital hearing loss      Past Surgical History:   Procedure Laterality Date     AS REMOVAL OF COCCYX  10/18/2017     COLONOSCOPY  2009    [facility].     COLONOSCOPY N/A 2019    Procedure: Colonoscopy, With Polypectomy And Biopsy;  Surgeon: Edwin Conde MD;  Location: [facility]     COLONOSCOPY WITH CO2 INSUFFLATION N/A 2019    Procedure: COLONOSCOPY, WITH  CO2 INSUFFLATION;  Surgeon: Edwin Conde MD;  Location: [facility]     COMBINED ESOPHAGOSCOPY, GASTROSCOPY, DUODENOSCOPY (EGD) WITH CO2 INSUFFLATION N/A 4/12/2019    Procedure: COMBINED ESOPHAGOSCOPY, GASTROSCOPY, DUODENOSCOPY (EGD) WITH CO2 INSUFFLATION;  Surgeon: Edwin Conde MD;  Location: [facility]     ESOPHAGOSCOPY, GASTROSCOPY, DUODENOSCOPY (EGD), COMBINED N/A 4/12/2019    Procedure: Combined Esophagoscopy, Gastroscopy, Duodenoscopy (Egd), Biopsy Single Or Multiple;  Surgeon: Edwin Conde MD;  Location: [facility]     FISTULOTOMY RECTUM N/A 4/30/2020    Procedure: EXAM UNDER ANESTHESIA, ANUS, parital fistulotomy;  Surgeon: Valentin Sanchez MD;  [facility]     FUSION SPINE POSTERIOR MINIMALLY INVASIVE ONE LEVEL N/A 2/23/2017    Procedure: FUSION SPINE POSTERIOR MINIMALLY INVASIVE ONE LEVEL;  L4-5 Oblique Lateral Lumbar Interbody Fusion   Epidural steroid injection.   Transpedicular Bone marrow aspiration;  Surgeon: Jeniffer Eugene MD;  Location: [facility] OR     HC EEG AWAKE AND SLEEP      abnormal     HC MRI BRAIN W/O CONTRAST  12/00    normal     HC REMOVAL GALLBLADDER  8/5/2009    [facility] [place].     HC UGI ENDOSCOPY DIAG W BIOPSY  11/11/09    Normal esophagus     ORTHOPEDIC SURGERY  October 19,2011    diskectomy L4-L5     ZZC FUSION OF SACROILIAC JOINT  10/26/2018     ZZ COLONOSCOPY THRU STOMA WITH BIOPSY  10/29/2003    Impression is that of normal appearing colonoscopy, without evidence of rectal bleeding.     ZZ COLONOSCOPY THRU STOMA, DIAGNOSTIC  10/00    normal     ZZ COLONOSCOPY THRU STOMA, DIAGNOSTIC  Oct 2009    Dr López- normal     [facility] UGI ENDOSCOPY, SIMPLE EXAM  7/00, 10/00    mild chronic esophagitis and duodenitis, neg H pylori     [facility] UGI ENDOSCOPY, SIMPLE EXAM  01/20/2005    Esophagogastroduodenoscopy, colonoscopy with biopsies.  [facility] UGI ENDOSCOPY, SIMPLE EXAM  7/1/2009    [facility]  [place], [place].     [facility] UGI ENDOSCOPY, SIMPLE EXAM  11/11/2009    attempted upper GI, pt. could not tolerate procedure:[facility]      Allergies   Allergen Reactions     Iohexol Hives     Other reaction(s): Hives  IV contrast; patient states she tolerates contrast if she gets benadryl before  IV contrast; patient states she tolerates contrast if she gets benadryl before     Other Environmental Allergy Rash     Plastic tape     Baclofen      hives     Bees Hives, Swelling and Difficulty breathing     Contrast Dye      Hives,   Updated 5/10/2016 CT Contrast.     Iodine Hives     Metoclopramide      Other reaction(s): Tremors  LW Reaction: shaking/sweating     Midazolam      Other reaction(s): Agitation     Monosodium Glutamate      Morphine Other (See Comments)     Difficulty with urination     Nsaids Other (See Comments)     GI BLEED x2     Other (Do Not Use) Other (See Comments)     Xanaflex- pt becomes disoriented and loses bladder control     Reglan [Metoclopramide Hcl] Other (See Comments)     shaking     Soma [Carisoprodol] Visual Disturbance     Sleep walking     Tizanidine      Topamax Other (See Comments)     Topamax [Topiramate] Nausea     Tingling  GI/Vomit     Tramadol      Severe Headache, Seizure Risk     Tylenol W/Codeine [Acetaminophen-Codeine] Nausea and Itching     Tylenol 3     Versed Other (See Comments)     Zolmitriptan      Makes face feel like its twitching     Droperidol Anxiety     Flu Virus Vaccine Rash      Arm swelling      Social History     Tobacco Use     Smoking status: Former Smoker     Packs/day: 0.25     Years: 5.00     Pack years: 1.25     Types: Cigarettes     Smokeless tobacco: Never Used   Substance Use Topics     Alcohol use: Not Currently     Comment: Not since last July 2018      Wt Readings from Last 1 Encounters:   01/11/22 86.8 kg (191 lb 5.8 oz)        Anesthesia Evaluation   Pt has had prior anesthetic.     No history of anesthetic complications       ROS/MED  HX  ENT/Pulmonary: Comment:   Hearing impaired. Uses ASL for communication, and has left hearing aid.     (+) allergic rhinitis, tobacco use, Past use, 1  Pack-Year Hx,  Intermittent, asthma Treatment: Inhaler prn,      Neurologic:     (+) migraines,     Cardiovascular:     (+) hypertension-----Previous cardiac testing (10/2020)   Echo: Date: 08/2020 Results:  Left ventricular function, chamber size, wall motion, and wall thickness are normal.The EF is 55-60%.Left ventricular diastolic function is normal. Right ventricular function, chamber size, wall motion, and thickness are  Normal. No pericardial effusion is present. Valves normal. IVC diameter <2.1 cm collapsing >50% with sniff suggests a normal RA pressure of 3 mmHg.  Stress Test: Date: 1/2021 Results:    The nuclear stress test is negative for inducible myocardial ischemia or infarction.     The left ventricular ejection fraction at rest is 67%.  The left ventricular ejection fraction at stress is 79%.     Nuclear Study Quality: The  images demonstrate breast attenuation.     There is no prior study for comparison.  ECG Reviewed:  Date: 1/2022 Results:  Sinus tachy to 110  Cath:  Date: Results:      METS/Exercise Tolerance:     Hematologic: Comments:   DVT RUE, not provoked. Started on Eliquis, held for last 3 days.     (+) History of blood clots,  (-) history of blood transfusion   Musculoskeletal: Comment:   Chronic low back pain  Left sided sciatica  S/p spine procedures including 1 level lumbar and SIJ fusion.   Osteoarthritis both knees.   (+) arthritis,     GI/Hepatic: Comment:   Crohn's   Chronic abdominal pain  Rectal bleeding.     (+) GERD,     Renal/Genitourinary:       Endo:     (+) Obesity,     Psychiatric/Substance Use:     (+) psychiatric history anxiety and depression (Adjustment disorder. Borderline PD. )     Infectious Disease: Comment:   COVID pneumonia last fall, requiring hospital admission, but not intubation. She now has  received 2 doses of Pfizer COVID vaccine, last dose 1/7/2022       Malignancy:       Other:      (+) , H/O Chronic Pain,        Physical Exam    Airway  airway exam normal      Mallampati: I   TM distance: > 3 FB   Neck ROM: full   Mouth opening: > 3 cm    Respiratory Devices and Support         Dental  no notable dental history         Cardiovascular   cardiovascular exam normal          Pulmonary   pulmonary exam normal                OUTSIDE LABS:  CBC:   Lab Results   Component Value Date    WBC 4.7 01/07/2022    WBC 4.7 01/04/2022    HGB 12.3 01/07/2022    HGB 12.8 01/04/2022    HCT 36.5 01/07/2022    HCT 37.2 01/04/2022     01/07/2022     01/04/2022     BMP:   Lab Results   Component Value Date     01/07/2022     01/04/2022    POTASSIUM 4.6 01/07/2022    POTASSIUM 4.3 01/04/2022    CHLORIDE 105 01/07/2022    CHLORIDE 106 01/04/2022    CO2 30 01/07/2022    CO2 28 01/04/2022    BUN 18 01/07/2022    BUN 13 01/04/2022    CR 0.86 01/07/2022    CR 0.80 01/04/2022    GLC 88 01/07/2022    GLC 90 01/04/2022     COAGS:   Lab Results   Component Value Date    PTT 27 09/05/2021    INR 1.09 09/05/2021     POC:   Lab Results   Component Value Date    BGM 74 04/30/2020    HCG Negative 09/03/2021    HCGS Negative 01/04/2022     HEPATIC:   Lab Results   Component Value Date    ALBUMIN 3.4 01/04/2022    PROTTOTAL 7.0 01/04/2022    ALT 64 (H) 01/04/2022    AST 25 01/04/2022    ALKPHOS 91 01/04/2022    BILITOTAL 0.2 01/04/2022     OTHER:   Lab Results   Component Value Date    PH 5.5 07/22/2002    LACT 0.7 09/08/2021    A1C 4.9 08/06/2020    SARANYA 9.1 01/07/2022    PHOS 2.7 08/24/2021    MAG 2.0 01/03/2022    LIPASE 84 01/04/2022    AMYLASE 88 04/26/2004    TSH 0.44 01/03/2022    T4 1.09 08/30/2021    CRP <2.9 09/30/2021    SED 9 09/30/2021       Anesthesia Plan    ASA Status:  3   NPO Status:  NPO Appropriate    Anesthesia Type: MAC.     - Reason for MAC: immobility needed, straight local not clinically  adequate   Induction: Propofol.           Consents    Anesthesia Plan(s) and associated risks, benefits, and realistic alternatives discussed. Questions answered and patient/representative(s) expressed understanding.    - Discussed:     - Discussed with:  Patient      - Extended Intubation/Ventilatory Support Discussed: No.      - Patient is DNR/DNI Status: No    Use of blood products discussed: No .     Postoperative Care    Pain management: IV analgesics, Oral pain medications.   PONV prophylaxis: Ondansetron (or other 5HT-3), Dexamethasone or Solumedrol     Comments:           H&P reviewed: Unable to attach H&P to encounter due to EHR limitations. H&P Update: appropriate H&P reviewed, patient examined. No interval changes since H&P (within 30 days).         Marcie Orellana MD
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
## 2022-01-11 NOTE — ANESTHESIA CARE TRANSFER NOTE
Patient: Katy Islas    Procedure: Procedure(s):  COLONOSCOPY       Diagnosis: Crohn's disease of colon without complication (H) [K50.10]  Diagnosis Additional Information: No value filed.    Anesthesia Type:   MAC     Note:    Oropharynx: spontaneously breathing  Level of Consciousness: awake  Oxygen Supplementation: room air    Independent Airway: airway patency satisfactory and stable  Dentition: dentition unchanged  Vital Signs Stable: post-procedure vital signs reviewed and stable  Report to RN Given: handoff report given  Patient transferred to: Phase II    Handoff Report: Identifed the Patient, Identified the Reponsible Provider, Reviewed the pertinent medical history, Discussed the surgical course, Reviewed Intra-OP anesthesia mangement and issues during anesthesia, Set expectations for post-procedure period and Allowed opportunity for questions and acknowledgement of understanding      Vitals:  Vitals Value Taken Time   BP     Temp     Pulse     Resp     SpO2         Electronically Signed By: Marian Sykes  January 11, 2022  1:02 PM

## 2022-01-12 LAB
PATH REPORT.COMMENTS IMP SPEC: NORMAL
PATH REPORT.FINAL DX SPEC: NORMAL
PATH REPORT.GROSS SPEC: NORMAL
PATH REPORT.MICROSCOPIC SPEC OTHER STN: NORMAL
PATH REPORT.RELEVANT HX SPEC: NORMAL
PHOTO IMAGE: NORMAL

## 2022-01-12 PROCEDURE — 88305 TISSUE EXAM BY PATHOLOGIST: CPT | Mod: 26 | Performed by: PATHOLOGY

## 2022-01-14 ENCOUNTER — MYC REFILL (OUTPATIENT)
Dept: FAMILY MEDICINE | Facility: CLINIC | Age: 32
End: 2022-01-14
Payer: MEDICARE

## 2022-01-14 ENCOUNTER — TELEPHONE (OUTPATIENT)
Dept: GASTROENTEROLOGY | Facility: CLINIC | Age: 32
End: 2022-01-14

## 2022-01-14 DIAGNOSIS — G89.29 CHRONIC BILATERAL LOW BACK PAIN WITH LEFT-SIDED SCIATICA: ICD-10-CM

## 2022-01-14 DIAGNOSIS — M54.42 CHRONIC BILATERAL LOW BACK PAIN WITH LEFT-SIDED SCIATICA: ICD-10-CM

## 2022-01-14 PROCEDURE — 93010 ELECTROCARDIOGRAM REPORT: CPT | Performed by: EMERGENCY MEDICINE

## 2022-01-14 PROCEDURE — 93005 ELECTROCARDIOGRAM TRACING: CPT | Performed by: EMERGENCY MEDICINE

## 2022-01-14 PROCEDURE — C9803 HOPD COVID-19 SPEC COLLECT: HCPCS | Performed by: EMERGENCY MEDICINE

## 2022-01-14 PROCEDURE — 99285 EMERGENCY DEPT VISIT HI MDM: CPT | Mod: 25 | Performed by: EMERGENCY MEDICINE

## 2022-01-14 RX ORDER — CYCLOBENZAPRINE HCL 10 MG
10 TABLET ORAL 3 TIMES DAILY PRN
Qty: 45 TABLET | Refills: 0 | Status: SHIPPED | OUTPATIENT
Start: 2022-01-14 | End: 2022-01-24

## 2022-01-14 RX ORDER — CYCLOBENZAPRINE HCL 10 MG
10 TABLET ORAL 3 TIMES DAILY PRN
Qty: 45 TABLET | Refills: 0 | OUTPATIENT
Start: 2022-01-14

## 2022-01-14 NOTE — TELEPHONE ENCOUNTER
Patient is trying to get a hold of gastrology. Calling to discuss colonoscopy results.     EDYTA العليN, RN, PHN  [place]  January 14, 2022

## 2022-01-14 NOTE — TELEPHONE ENCOUNTER
Pending Prescriptions:                       Disp   Refills    cyclobenzaprine (FLEXERIL) 10 MG tablet    45 tab*0        Sig: Take 1 tablet (10 mg) by mouth 3 times daily as           needed (pain)        Routing refill request to provider for review/approval because:  Drug not on the [facility] refill protocol     EDYTA العليN, RN, PHN  [place]  January 14, 2022

## 2022-01-15 ENCOUNTER — HOSPITAL ENCOUNTER (EMERGENCY)
Facility: CLINIC | Age: 32
Discharge: LEFT BEFORE DISCHARGE | End: 2022-01-15
Attending: EMERGENCY MEDICINE | Admitting: EMERGENCY MEDICINE
Payer: MEDICARE

## 2022-01-15 ENCOUNTER — APPOINTMENT (OUTPATIENT)
Dept: GENERAL RADIOLOGY | Facility: CLINIC | Age: 32
End: 2022-01-15
Attending: EMERGENCY MEDICINE
Payer: MEDICARE

## 2022-01-15 VITALS
TEMPERATURE: 97.4 F | DIASTOLIC BLOOD PRESSURE: 97 MMHG | RESPIRATION RATE: 18 BRPM | SYSTOLIC BLOOD PRESSURE: 134 MMHG | OXYGEN SATURATION: 100 % | HEART RATE: 112 BPM

## 2022-01-15 DIAGNOSIS — R07.9 CHEST PAIN, UNSPECIFIED TYPE: ICD-10-CM

## 2022-01-15 LAB
ANION GAP SERPL CALCULATED.3IONS-SCNC: 4 MMOL/L (ref 3–14)
ATRIAL RATE - MUSE: 113 BPM
BUN SERPL-MCNC: 17 MG/DL (ref 7–30)
CALCIUM SERPL-MCNC: 8.7 MG/DL (ref 8.5–10.1)
CHLORIDE BLD-SCNC: 111 MMOL/L (ref 94–109)
CO2 SERPL-SCNC: 26 MMOL/L (ref 20–32)
CREAT SERPL-MCNC: 0.77 MG/DL (ref 0.52–1.04)
DIASTOLIC BLOOD PRESSURE - MUSE: NORMAL MMHG
FLUAV RNA SPEC QL NAA+PROBE: NEGATIVE
FLUBV RNA RESP QL NAA+PROBE: NEGATIVE
GFR SERPL CREATININE-BSD FRML MDRD: >90 ML/MIN/1.73M2
GLUCOSE BLD-MCNC: 106 MG/DL (ref 70–99)
INTERPRETATION ECG - MUSE: NORMAL
P AXIS - MUSE: 33 DEGREES
POTASSIUM BLD-SCNC: 5.2 MMOL/L (ref 3.4–5.3)
PR INTERVAL - MUSE: 130 MS
QRS DURATION - MUSE: 84 MS
QT - MUSE: 332 MS
QTC - MUSE: 455 MS
R AXIS - MUSE: 37 DEGREES
RSV RNA SPEC NAA+PROBE: NEGATIVE
SARS-COV-2 RNA RESP QL NAA+PROBE: NEGATIVE
SODIUM SERPL-SCNC: 141 MMOL/L (ref 133–144)
SYSTOLIC BLOOD PRESSURE - MUSE: NORMAL MMHG
T AXIS - MUSE: 36 DEGREES
TROPONIN I SERPL HS-MCNC: 4 NG/L
VENTRICULAR RATE- MUSE: 113 BPM

## 2022-01-15 PROCEDURE — 87637 SARSCOV2&INF A&B&RSV AMP PRB: CPT | Mod: 59 | Performed by: EMERGENCY MEDICINE

## 2022-01-15 PROCEDURE — 71046 X-RAY EXAM CHEST 2 VIEWS: CPT | Mod: 26 | Performed by: RADIOLOGY

## 2022-01-15 PROCEDURE — 84484 ASSAY OF TROPONIN QUANT: CPT | Performed by: EMERGENCY MEDICINE

## 2022-01-15 PROCEDURE — 80048 BASIC METABOLIC PNL TOTAL CA: CPT | Performed by: EMERGENCY MEDICINE

## 2022-01-15 PROCEDURE — 71046 X-RAY EXAM CHEST 2 VIEWS: CPT

## 2022-01-15 PROCEDURE — 36415 COLL VENOUS BLD VENIPUNCTURE: CPT | Performed by: EMERGENCY MEDICINE

## 2022-01-15 PROCEDURE — 250N000011 HC RX IP 250 OP 636: Performed by: EMERGENCY MEDICINE

## 2022-01-15 PROCEDURE — 87637 SARSCOV2&INF A&B&RSV AMP PRB: CPT | Performed by: EMERGENCY MEDICINE

## 2022-01-15 RX ORDER — DICLOFENAC EPOLAMINE 0.01 G/1
1 SYSTEM TOPICAL 2 TIMES DAILY
Status: DISCONTINUED | OUTPATIENT
Start: 2022-01-15 | End: 2022-01-15 | Stop reason: HOSPADM

## 2022-01-15 RX ORDER — ONDANSETRON 4 MG/1
4 TABLET, ORALLY DISINTEGRATING ORAL ONCE
Status: COMPLETED | OUTPATIENT
Start: 2022-01-15 | End: 2022-01-15

## 2022-01-15 RX ADMIN — ONDANSETRON 4 MG: 4 TABLET, ORALLY DISINTEGRATING ORAL at 02:12

## 2022-01-15 NOTE — ED PROVIDER NOTES
ED Provider Note  [facility]      History     Chief Complaint   Patient presents with     Cough     HPI  Katy Islas is a 31 year old female with a PMH of Crohn's disease, C. difficile colitis, GERD, HTN, partial epilepsy, seizures, migraine, asthma, GLADYS, DVT on Eliquis, tobacco abuse, borderline personality disorder, bipolar disorder who presents the ED today complaining of cough.    Patient presents with 1 day of cough and diffuse chest pain. Patient reports that chest pain is across her entire chest, nonradiating, nonexertional and worse with coughing. She is not experiencing any significant shortness of breath, no new lower extremity swelling or edema. Patient does have a history of DVT reports that she has missed approximately 4 doses of Eliquis this week. She denies any fevers, no known COVID exposure. She endorses some nausea. She does have chronic diarrhea which is secondary to her Crohn's disease, she reports she had a colonoscopy last week. No hemoptysis, no recent prolonged immobilization.    Patient was recently seen here in the ED on 1/4/2022 complaining of chest pain.  She reported compliance with her Eliquis but stated she does not always take it on time.  Patient's EKG did not show any acute ischemic changes.  Troponin was normal.  D-dimer was normal.  COVID and influenza were negative.  Patient was discharged home.      Past Medical History  Past Medical History:   Diagnosis Date     Abdominal pain 10/31- 11/4/2005    [facility] admit for Crohn's     Allergic rhinitis, cause unspecified     Allergic rhinitis     Arthritis      C. difficile diarrhea     Past, no current diarrhea.     Crohn disease (H)      Crohn's disease (H)     sees Dr Summers or Ryan at [facility] in [place]     Crohn's disease (H)      Depression with anxiety 2003    Dr Bernard (psychiatry) at [facility],      Esophageal reflux     GERD     Grand mal seizure disorder (H) 10/8/2013      Hypertension      Intestinal infection due to Clostridium difficile 10/00    C diff culture and toxin positive, treated with Flagyl     Localization-related (focal) (partial) epilepsy and epileptic syndromes with simple partial seizures, without mention of intractable epilepsy     pseudoseizures diagnosed after extensive neurologic eval     Migraine 07/21/12    D/C 07/22/12-[facility]     Migraine, unspecified, without mention of intractable migraine without mention of status migrainosus     Migraine     Mild intermittent asthma     mild intermittent     Mycoplasma infection in conditions classified elsewhere and of unspecified site      Other chronic pain     Back pain for 6 years     Renal disease     Kidney stones     Unspecified hearing loss     congenital hearing loss     Past Surgical History:   Procedure Laterality Date     AS REMOVAL OF COCCYX  10/18/2017     COLONOSCOPY  7/1/2009    [facility].     COLONOSCOPY N/A 7/17/2019    Procedure: Colonoscopy, With Polypectomy And Biopsy;  Surgeon: Edwin Conde MD;  Location: [facility]     COLONOSCOPY N/A 1/11/2022    Procedure: COLONOSCOPY, WITH POLYPECTOMY AND BIOPSY;  Surgeon: Bob Ugarte DO;  Location: [facility]     COLONOSCOPY WITH CO2 INSUFFLATION N/A 7/17/2019    Procedure: COLONOSCOPY, WITH CO2 INSUFFLATION;  Surgeon: Edwin Conde MD;  Location: [facility]     COMBINED ESOPHAGOSCOPY, GASTROSCOPY, DUODENOSCOPY (EGD) WITH CO2 INSUFFLATION N/A 4/12/2019    Procedure: COMBINED ESOPHAGOSCOPY, GASTROSCOPY, DUODENOSCOPY (EGD) WITH CO2 INSUFFLATION;  Surgeon: Edwin Conde MD;  Location: [facility]     ESOPHAGOSCOPY, GASTROSCOPY, DUODENOSCOPY (EGD), COMBINED N/A 4/12/2019    Procedure: Combined Esophagoscopy, Gastroscopy, Duodenoscopy (Egd), Biopsy Single Or Multiple;  Surgeon: Edwin Conde MD;  Location: [facility]     FISTULOTOMY RECTUM N/A 4/30/2020    Procedure: EXAM UNDER ANESTHESIA, ANUS,  parital fistulotomy;  Surgeon: Valentin Sanchez MD;  Location: [facility]     FUSION SPINE POSTERIOR MINIMALLY INVASIVE ONE LEVEL N/A 2/23/2017    Procedure: FUSION SPINE POSTERIOR MINIMALLY INVASIVE ONE LEVEL;  L4-5 Oblique Lateral Lumbar Interbody Fusion   Epidural steroid injection.   Transpedicular Bone marrow aspiration;  Surgeon: Jeniffer Eugene MD;  Location: [facility] OR     HC EEG AWAKE AND SLEEP      abnormal     HC MRI BRAIN W/O CONTRAST  12/00    normal     HC REMOVAL GALLBLADDER  8/5/2009    [facility].     HC UGI ENDOSCOPY DIAG W BIOPSY  11/11/09    Normal esophagus     ORTHOPEDIC SURGERY  October 19,2011    diskectomy L4-L5     ZZC FUSION OF SACROILIAC JOINT  10/26/2018     ZZ COLONOSCOPY THRU STOMA WITH BIOPSY  10/29/2003    Impression is that of normal appearing colonoscopy, without evidence of rectal bleeding.     [facility] COLONOSCOPY THRU STOMA, DIAGNOSTIC  10/00    normal     [facility] COLONOSCOPY THRU STOMA, DIAGNOSTIC  Oct 2009    Dr López- normal     [facility] UGI ENDOSCOPY, SIMPLE EXAM  7/00, 10/00    mild chronic esophagitis and duodenitis, neg H pylori     [facility] UGI ENDOSCOPY, SIMPLE EXAM  01/20/2005    Esophagogastroduodenoscopy, colonoscopy with biopsies.  [facility] UGI ENDOSCOPY, SIMPLE EXAM  7/1/2009    [facility], [facility].     [facility] UGI ENDOSCOPY, SIMPLE EXAM  11/11/2009    attempted upper GI, pt. could not tolerate procedure:[facility]     acetaminophen-codeine (TYLENOL #3) 300-30 MG tablet  albuterol (PROAIR HFA/PROVENTIL HFA/VENTOLIN HFA) 108 (90 Base) MCG/ACT inhaler  ARIPiprazole (ABILIFY) 30 MG tablet  azelastine (OPTIVAR) 0.05 % ophthalmic solution  bisacodyl (DULCOLAX) 5 MG EC tablet  buPROPion (WELLBUTRIN XL) 300 MG 24 hr tablet  busPIRone (BUSPAR) 15 MG tablet  clonazePAM (KLONOPIN) 1 MG tablet  cyclobenzaprine (FLEXERIL) 10 MG tablet  dicyclomine (BENTYL) 20 MG tablet  DULoxetine (CYMBALTA) 30 MG capsule  ELIQUIS  ANTICOAGULANT 2.5 MG tablet  EPINEPHrine (ANY BX GENERIC EQUIV) 0.3 MG/0.3ML injection 2-pack  Ferrous Sulfate (IRON PO)  furosemide (LASIX) 20 MG tablet  gabapentin (NEURONTIN) 300 MG capsule  HYDROcodone-acetaminophen (NORCO) 5-325 MG tablet  HYDROmorphone (DILAUDID) 2 MG tablet  lidocaine (XYLOCAINE) 2 % solution  mesalamine ER (PENTASA) 500 MG CR capsule  metoprolol succinate ER (TOPROL-XL) 50 MG 24 hr tablet  NIFEdipine ER OSMOTIC (PROCARDIA XL) 60 MG 24 hr tablet  ondansetron (ZOFRAN-ODT) 4 MG ODT tab  pantoprazole (PROTONIX) 40 MG EC tablet  polyethylene glycol (MIRALAX) 17 g packet  polyethylene glycol (MIRALAX) 17 GM/Dose powder  rizatriptan (MAXALT-MLT) 5 MG ODT  sucralfate (CARAFATE) 1 GM tablet      Allergies   Allergen Reactions     Iohexol Hives     Other reaction(s): Hives  IV contrast; patient states she tolerates contrast if she gets benadryl before  IV contrast; patient states she tolerates contrast if she gets benadryl before     Other Environmental Allergy Rash     Plastic tape     Baclofen      hives     Bees Hives, Swelling and Difficulty breathing     Contrast Dye      Hives,   Updated 5/10/2016 CT Contrast.     Iodine Hives     Metoclopramide      Other reaction(s): Tremors  LW Reaction: shaking/sweating     Midazolam      Other reaction(s): Agitation     Monosodium Glutamate      Morphine Other (See Comments)     Difficulty with urination     Nsaids Other (See Comments)     GI BLEED x2     Other (Do Not Use) Other (See Comments)     Xanaflex- pt becomes disoriented and loses bladder control     Reglan [Metoclopramide Hcl] Other (See Comments)     shaking     Soma [Carisoprodol] Visual Disturbance     Sleep walking     Tizanidine      Topamax Other (See Comments)     Topamax [Topiramate] Nausea     Tingling  GI/Vomit     Tramadol      Severe Headache, Seizure Risk     Tylenol W/Codeine [Acetaminophen-Codeine] Nausea and Itching     Tylenol 3     Versed Other (See Comments)     Zolmitriptan       Makes face feel like its twitching     Droperidol Anxiety     Flu Virus Vaccine Rash      Arm swelling     Family History  Family History   Problem Relation Age of Onset     Gastrointestinal Disease Brother         severe Crohn's     Neurologic Disorder Brother         Seizures post head injury     Depression Brother      Substance Abuse Brother      Genitourinary Problems Father         kidney stones     Diabetes Father      Heart Disease Father         Open heart surgery     Breast Cancer Maternal Grandmother      Parkinsonism Maternal Grandmother      Cerebrovascular Disease Paternal Grandmother      Cancer Maternal Grandfather         Lung     Cardiovascular Paternal Grandfather         Heart Attack     Substance Abuse Mother         in recovery x1 year      Lung Cancer Paternal Uncle      Cancer Paternal Uncle      Lung Cancer Maternal Uncle      Colon Cancer Maternal Uncle      Social History   Social History     Tobacco Use     Smoking status: Former Smoker     Packs/day: 0.25     Years: 5.00     Pack years: 1.25     Types: Cigarettes     Smokeless tobacco: Never Used   Vaping Use     Vaping Use: Former     Substances: CBD     Devices: Disposable, Pre-filled or refillable cartridge   Substance Use Topics     Alcohol use: Not Currently     Comment: Not since last July 2018     Drug use: Not Currently     Types: Marijuana     Comment: Medical Marijuana currently-- More CBD      Past medical history, past surgical history, medications, allergies, family history, and social history were reviewed with the patient. No additional pertinent items.       Review of Systems  A complete review of systems was performed with pertinent positives and negatives noted in the HPI, and all other systems negative.    Physical Exam   BP: (!) 134/97  Pulse: 112  Temp: 97.4  F (36.3  C)  Resp: 18  SpO2: 100 %  Physical Exam  General: awake, alert, NAD  Head: normal cephalic  HEENT: pupils equal, conjugate gaze intact  Neck:  Supple  CV: regular rate and rhythm without murmur  Lungs: clear to auscultation  Abd: soft, non-tender, no guarding, no peritoneal signs  EXT: lower extremities without swelling or edema  Neuro: awake, answers questions appropriately. No focal deficits noted       ED Course      Procedures            EKG Interpretation:      Interpreted by Sandoval Albert MD  Time reviewed: 0252  Symptoms at time of EKG: Chest pain  Rhythm: normal sinus   Rate: normal  Axis: normal  Ectopy: none  Conduction: normal  ST Segments/ T Waves: No ST-T wave changes  Q Waves: none  Comparison to prior: Unchanged    Clinical Impression: Sinus tachycardia, no signs of acute ischemia.  Normal intervals without other signs of underlying arrhythmia.        The medical record was reviewed and interpreted.  Current labs reviewed and interpreted.  Previous labs reviewed and interpreted.  Current images reviewed and interpreted: No new findings  EKG reviewed and interpreted: Sinus tachycardia              Results for orders placed or performed during the hospital encounter of 01/15/22   XR Chest 2 Views     Status: None    Narrative    EXAM: XR CHEST 2 VW  LOCATION: [facility]  DATE/TIME: 1/15/2022 2:33 AM    INDICATION: chest pain  COMPARISON: 01/03/2022      Impression    IMPRESSION: Elevated left hemidiaphragm. No pneumothorax or pleural effusion. Borderline enlarged cardiac silhouette. Central pulmonary vasculature within normal limits. Thoracic spinal stimulator.   Symptomatic; Unknown Influenza A/B & SARS-CoV2 (COVID-19) Virus PCR Multiplex Nasopharyngeal     Status: Normal    Specimen: Nasopharyngeal; Swab   Result Value Ref Range    Influenza A PCR Negative Negative    Influenza B PCR Negative Negative    RSV PCR Negative Negative    SARS CoV2 PCR Negative Negative, Testing sent to reference lab. Results will be returned via unsolicited result    Narrative    Testing was performed using the [employer]  Xpress CoV2/Flu/RSV Assay on the [employer] GeneXpert Instrument. This test should be ordered for the detection of SARS-CoV-2 and influenza viruses in individuals who meet clinical and/or epidemiological criteria. Test performance is unknown in asymptomatic patients. This test is for in vitro diagnostic use under the FDA EUA for laboratories certified under CLIA to perform high or moderate complexity testing. This test has not been FDA cleared or approved. A negative result does not rule out the presence of PCR inhibitors in the specimen or target RNA in concentration below the limit of detection for the assay. If only one viral target is positive but coinfection with multiple targets is suspected, the sample should be re-tested with another FDA cleared, approved, or authorized test, if coinfection would change clinical management. This test was validated by the [facility]. These laboratories are certified under the Clinical  Laboratory Improvement Amendments of 1988 (CLIA-88) as qualified to perform high complexity laboratory testing.   Basic metabolic panel     Status: Abnormal   Result Value Ref Range    Sodium 141 133 - 144 mmol/L    Potassium 5.2 3.4 - 5.3 mmol/L    Chloride 111 (H) 94 - 109 mmol/L    Carbon Dioxide (CO2) 26 20 - 32 mmol/L    Anion Gap 4 3 - 14 mmol/L    Urea Nitrogen 17 7 - 30 mg/dL    Creatinine 0.77 0.52 - 1.04 mg/dL    Calcium 8.7 8.5 - 10.1 mg/dL    Glucose 106 (H) 70 - 99 mg/dL    GFR Estimate >90 >60 mL/min/1.73m2   Troponin I     Status: Normal   Result Value Ref Range    Troponin I High Sensitivity 4 <54 ng/L   EKG 12-lead, tracing only     Status: None (Preliminary result)   Result Value Ref Range    Systolic Blood Pressure  mmHg    Diastolic Blood Pressure  mmHg    Ventricular Rate 113 BPM    Atrial Rate 113 BPM    ME Interval 130 ms    QRS Duration 84 ms     ms    QTc 455 ms    P Axis 33 degrees    R AXIS 37 degrees    T Axis 36 degrees    Interpretation ECG  Sinus tachycardia  Otherwise normal ECG      CBC with platelets differential     Status: None ()    Narrative    The following orders were created for panel order CBC with platelets differential.  Procedure                               Abnormality         Status                     ---------                               -----------         ------                     CBC with platelets and d...[[id number]]                                                   Please view results for these tests on the individual orders.     Medications   diclofenac (FLECTOR) 1.3 % patch 1 patch (has no administration in time range)     And   diclofenac (FLECTOR) Patch in Place (has no administration in time range)   ondansetron (ZOFRAN-ODT) ODT tab 4 mg (4 mg Oral Given 1/15/22 0212)        Assessments & Plan (with Medical Decision Making)   Katy is a 31-year-old female past medical history significant for GERD, hypertension, and DVT who presents with chest pain and cough.    She is well-appearing, nontoxic. Initial vital signs slightly tachycardic. She has a normal heart and lung exam. No lower extremity swelling edema on exam.    Differential could include bronchitis, COVID, pneumonia, PE, some type of myocarditis, less likely an ACS given lack of risk factors and atypical presentation.    EKG showed sinus tachycardia without signs of underlying arrhythmias, normal intervals.  No evidence of ischemia.  Her troponin is negative.      Patient's troponin negative, BMP is unremarkable, COVID test is negative,    Chest x-ray showed elevated left hemidiaphragm, this appears chronic and unchanged, with no pneumothorax or infiltrates.    Patient was initially given Tylenol and Zofran for her chest pain and nausea.  She was requesting Dilaudid for chest pain, had lost IV access.  D-dimer had been clotted and was awaiting vascular access for redraw labs and IV access.  Patient then told the nurse she was going to leave AGAINST MEDICAL ADVICE.   Patient had left the facility prior to my ability to counselor talk to her.    Patient's COVID test is negative here in the emergency department.    I have reviewed the nursing notes. I have reviewed the findings, diagnosis, plan and need for follow up with the patient.    Discharge Medication List as of 1/15/2022  4:19 AM          Final diagnoses:   Chest pain, unspecified type       --  [facility] EMERGENCY DEPARTMENT  1/14/2022     Sandoval Albert MD  01/15/22 0428

## 2022-01-15 NOTE — ED NOTES
Pt wanted to leave AMA. While I was obtaining AMA paperwork and contacting provider,the pt did not want to wait to see provider or to sign paperwork and left facility.

## 2022-01-17 ENCOUNTER — NURSE TRIAGE (OUTPATIENT)
Dept: NURSING | Facility: CLINIC | Age: 32
End: 2022-01-17

## 2022-01-17 ENCOUNTER — APPOINTMENT (OUTPATIENT)
Dept: CT IMAGING | Facility: CLINIC | Age: 32
End: 2022-01-17
Attending: EMERGENCY MEDICINE
Payer: MEDICARE

## 2022-01-17 ENCOUNTER — NURSE TRIAGE (OUTPATIENT)
Dept: FAMILY MEDICINE | Facility: CLINIC | Age: 32
End: 2022-01-17
Payer: MEDICARE

## 2022-01-17 ENCOUNTER — HOSPITAL ENCOUNTER (EMERGENCY)
Facility: CLINIC | Age: 32
Discharge: HOME | End: 2022-01-18
Attending: EMERGENCY MEDICINE | Admitting: EMERGENCY MEDICINE
Payer: MEDICARE

## 2022-01-17 ENCOUNTER — VIRTUAL VISIT (OUTPATIENT)
Dept: INTERNAL MEDICINE | Facility: CLINIC | Age: 32
End: 2022-01-17
Payer: MEDICARE

## 2022-01-17 ENCOUNTER — TELEPHONE (OUTPATIENT)
Dept: GASTROENTEROLOGY | Facility: CLINIC | Age: 32
End: 2022-01-17
Payer: MEDICARE

## 2022-01-17 DIAGNOSIS — R05.9 COUGH: Primary | ICD-10-CM

## 2022-01-17 DIAGNOSIS — R04.2 HEMOPTYSIS: ICD-10-CM

## 2022-01-17 DIAGNOSIS — R07.9 CHEST PAIN, UNSPECIFIED TYPE: ICD-10-CM

## 2022-01-17 LAB
ALBUMIN SERPL-MCNC: 3.4 G/DL (ref 3.4–5)
ALP SERPL-CCNC: 85 U/L (ref 40–150)
ALT SERPL W P-5'-P-CCNC: 27 U/L (ref 0–50)
ANION GAP SERPL CALCULATED.3IONS-SCNC: 8 MMOL/L (ref 3–14)
AST SERPL W P-5'-P-CCNC: 14 U/L (ref 0–45)
BASOPHILS # BLD AUTO: 0 10E3/UL (ref 0–0.2)
BASOPHILS NFR BLD AUTO: 0 %
BILIRUB SERPL-MCNC: 0.2 MG/DL (ref 0.2–1.3)
BUN SERPL-MCNC: 14 MG/DL (ref 7–30)
CALCIUM SERPL-MCNC: 8.8 MG/DL (ref 8.5–10.1)
CHLORIDE BLD-SCNC: 104 MMOL/L (ref 94–109)
CO2 SERPL-SCNC: 28 MMOL/L (ref 20–32)
CREAT SERPL-MCNC: 0.78 MG/DL (ref 0.52–1.04)
D DIMER PPP FEU-MCNC: 0.35 UG/ML FEU (ref 0–0.5)
EOSINOPHIL # BLD AUTO: 0.1 10E3/UL (ref 0–0.7)
EOSINOPHIL NFR BLD AUTO: 2 %
ERYTHROCYTE [DISTWIDTH] IN BLOOD BY AUTOMATED COUNT: 13.2 % (ref 10–15)
GFR SERPL CREATININE-BSD FRML MDRD: >90 ML/MIN/1.73M2
GLUCOSE BLD-MCNC: 103 MG/DL (ref 70–99)
HCT VFR BLD AUTO: 37.8 % (ref 35–47)
HGB BLD-MCNC: 12.9 G/DL (ref 11.7–15.7)
IMM GRANULOCYTES # BLD: 0 10E3/UL
IMM GRANULOCYTES NFR BLD: 0 %
LYMPHOCYTES # BLD AUTO: 2 10E3/UL (ref 0.8–5.3)
LYMPHOCYTES NFR BLD AUTO: 38 %
MCH RBC QN AUTO: 32.7 PG (ref 26.5–33)
MCHC RBC AUTO-ENTMCNC: 34.1 G/DL (ref 31.5–36.5)
MCV RBC AUTO: 96 FL (ref 78–100)
MONOCYTES # BLD AUTO: 0.6 10E3/UL (ref 0–1.3)
MONOCYTES NFR BLD AUTO: 11 %
NEUTROPHILS # BLD AUTO: 2.6 10E3/UL (ref 1.6–8.3)
NEUTROPHILS NFR BLD AUTO: 49 %
NRBC # BLD AUTO: 0 10E3/UL
NRBC BLD AUTO-RTO: 0 /100
NT-PROBNP SERPL-MCNC: 34 PG/ML (ref 0–450)
PLATELET # BLD AUTO: 270 10E3/UL (ref 150–450)
POTASSIUM BLD-SCNC: 3.6 MMOL/L (ref 3.4–5.3)
PROT SERPL-MCNC: 6.8 G/DL (ref 6.8–8.8)
RBC # BLD AUTO: 3.94 10E6/UL (ref 3.8–5.2)
SODIUM SERPL-SCNC: 140 MMOL/L (ref 133–144)
TROPONIN I SERPL HS-MCNC: 5 NG/L
WBC # BLD AUTO: 5.3 10E3/UL (ref 4–11)

## 2022-01-17 PROCEDURE — 87637 SARSCOV2&INF A&B&RSV AMP PRB: CPT | Mod: 59 | Performed by: EMERGENCY MEDICINE

## 2022-01-17 PROCEDURE — 99285 EMERGENCY DEPT VISIT HI MDM: CPT | Mod: 25 | Performed by: EMERGENCY MEDICINE

## 2022-01-17 PROCEDURE — 93005 ELECTROCARDIOGRAM TRACING: CPT | Performed by: EMERGENCY MEDICINE

## 2022-01-17 PROCEDURE — 99285 EMERGENCY DEPT VISIT HI MDM: CPT | Performed by: EMERGENCY MEDICINE

## 2022-01-17 PROCEDURE — 80053 COMPREHEN METABOLIC PANEL: CPT | Performed by: EMERGENCY MEDICINE

## 2022-01-17 PROCEDURE — 71275 CT ANGIOGRAPHY CHEST: CPT | Mod: 26 | Performed by: RADIOLOGY

## 2022-01-17 PROCEDURE — 96375 TX/PRO/DX INJ NEW DRUG ADDON: CPT | Performed by: EMERGENCY MEDICINE

## 2022-01-17 PROCEDURE — 250N000011 HC RX IP 250 OP 636: Performed by: EMERGENCY MEDICINE

## 2022-01-17 PROCEDURE — 85025 COMPLETE CBC W/AUTO DIFF WBC: CPT | Performed by: EMERGENCY MEDICINE

## 2022-01-17 PROCEDURE — 71275 CT ANGIOGRAPHY CHEST: CPT | Mod: ME

## 2022-01-17 PROCEDURE — 999N000248 HC STATISTIC IV INSERT WITH US BY RN

## 2022-01-17 PROCEDURE — G1004 CDSM NDSC: HCPCS

## 2022-01-17 PROCEDURE — 85379 FIBRIN DEGRADATION QUANT: CPT | Performed by: EMERGENCY MEDICINE

## 2022-01-17 PROCEDURE — 87637 SARSCOV2&INF A&B&RSV AMP PRB: CPT | Performed by: EMERGENCY MEDICINE

## 2022-01-17 PROCEDURE — 36415 COLL VENOUS BLD VENIPUNCTURE: CPT | Performed by: EMERGENCY MEDICINE

## 2022-01-17 PROCEDURE — 84484 ASSAY OF TROPONIN QUANT: CPT | Performed by: EMERGENCY MEDICINE

## 2022-01-17 PROCEDURE — 96374 THER/PROPH/DIAG INJ IV PUSH: CPT | Performed by: EMERGENCY MEDICINE

## 2022-01-17 PROCEDURE — C9803 HOPD COVID-19 SPEC COLLECT: HCPCS | Performed by: EMERGENCY MEDICINE

## 2022-01-17 PROCEDURE — 83880 ASSAY OF NATRIURETIC PEPTIDE: CPT | Performed by: EMERGENCY MEDICINE

## 2022-01-17 PROCEDURE — 82040 ASSAY OF SERUM ALBUMIN: CPT | Performed by: EMERGENCY MEDICINE

## 2022-01-17 PROCEDURE — G1004 CDSM NDSC: HCPCS | Performed by: RADIOLOGY

## 2022-01-17 PROCEDURE — 250N000009 HC RX 250: Performed by: EMERGENCY MEDICINE

## 2022-01-17 RX ORDER — IOPAMIDOL 755 MG/ML
65 INJECTION, SOLUTION INTRAVASCULAR ONCE
Status: COMPLETED | OUTPATIENT
Start: 2022-01-17 | End: 2022-01-17

## 2022-01-17 RX ORDER — HYDROMORPHONE HYDROCHLORIDE 1 MG/ML
0.5 INJECTION, SOLUTION INTRAMUSCULAR; INTRAVENOUS; SUBCUTANEOUS EVERY 30 MIN PRN
Status: DISCONTINUED | OUTPATIENT
Start: 2022-01-17 | End: 2022-01-18 | Stop reason: HOSPADM

## 2022-01-17 RX ORDER — DIPHENHYDRAMINE HYDROCHLORIDE 50 MG/ML
50 INJECTION INTRAMUSCULAR; INTRAVENOUS ONCE
Status: COMPLETED | OUTPATIENT
Start: 2022-01-17 | End: 2022-01-17

## 2022-01-17 RX ORDER — ONDANSETRON 2 MG/ML
4 INJECTION INTRAMUSCULAR; INTRAVENOUS ONCE
Status: COMPLETED | OUTPATIENT
Start: 2022-01-17 | End: 2022-01-17

## 2022-01-17 RX ADMIN — SODIUM CHLORIDE, PRESERVATIVE FREE 97 ML: 5 INJECTION INTRAVENOUS at 23:59

## 2022-01-17 RX ADMIN — IOPAMIDOL 65 ML: 755 INJECTION, SOLUTION INTRAVENOUS at 23:59

## 2022-01-17 RX ADMIN — HYDROMORPHONE HYDROCHLORIDE 0.5 MG: 1 INJECTION, SOLUTION INTRAMUSCULAR; INTRAVENOUS; SUBCUTANEOUS at 23:29

## 2022-01-17 RX ADMIN — DIPHENHYDRAMINE HYDROCHLORIDE 50 MG: 50 INJECTION, SOLUTION INTRAMUSCULAR; INTRAVENOUS at 23:06

## 2022-01-17 RX ADMIN — ONDANSETRON 4 MG: 2 INJECTION INTRAMUSCULAR; INTRAVENOUS at 23:08

## 2022-01-17 ASSESSMENT — MIFFLIN-ST. JEOR: SCORE: 1502.4

## 2022-01-17 NOTE — TELEPHONE ENCOUNTER
"  Patient scheduled per protocol.    Additional Information    Negative: Difficulty breathing, and not from stuffy nose (e.g., not relieved by cleaning out the nose)    Negative: Sounds like a life-threatening emergency to the triager    Negative: SEVERE headache and has fever    Negative: Patient sounds very sick or weak to the triager    Negative: SEVERE sinus pain    Negative: Severe headache    Negative: Redness or swelling on the cheek, forehead, or around the eye    Negative: Fever > 103 F (39.4 C)    Negative: Fever > 101 F (38.3 C) and over 60 years of age    Negative: Fever > 100.0 F (37.8 C) and has diabetes mellitus or a weak immune system (e.g., HIV positive, cancer chemotherapy, organ transplant, splenectomy, chronic steroids)    Negative: Fever > 100.0 F (37.8 C) and bedridden (e.g., nursing home patient, stroke, chronic illness, recovering from surgery)    Sinus congestion (pressure, fullness) present > 10 days    Negative: Fever present > 3 days (72 hours)    Negative: Fever returns after gone for over 24 hours and symptoms worse or not improved    Negative: Sinus pain (not just congestion) and fever    Negative: Earache    Answer Assessment - Initial Assessment Questions  1. LOCATION: \"Where does it hurt?\"       Stomach and lower back ache    2. ONSET: \"When did the sinus pain start?\"  (e.g., hours, days)       No pain in nasal cavities, pain in stomach    3. SEVERITY: \"How bad is the pain?\"   (Scale 1-10; mild, moderate or severe)    - MILD (1-3): doesn't interfere with normal activities     - MODERATE (4-7): interferes with normal activities (e.g., work or school) or awakens from sleep    - SEVERE (8-10): excruciating pain and patient unable to do any normal activities         6/10 - comes and goes    4. RECURRENT SYMPTOM: \"Have you ever had sinus problems before?\" If so, ask: \"When was the last time?\" and \"What happened that time?\"       Yes, last time with pneumonia    5. NASAL CONGESTION: \"Is " "the nose blocked?\" If so, ask, \"Can you open it or must you breathe through the mouth?\"      Can breath through nose    6. NASAL DISCHARGE: \"Do you have discharge from your nose?\" If so ask, \"What color?\"      Yes green    7. FEVER: \"Do you have a fever?\" If so, ask: \"What is it, how was it measured, and when did it start?\"       No fever    8. OTHER SYMPTOMS: \"Do you have any other symptoms?\" (e.g., sore throat, cough, earache, difficulty breathing)      Vomiting, cough, heart feels racing    9. PREGNANCY: \"Is there any chance you are pregnant?\" \"When was your last menstrual period?\"      No    Protocols used: SINUS PAIN AND CONGESTION-A-OH    Trini Cassidy RN    "

## 2022-01-17 NOTE — TELEPHONE ENCOUNTER
Patient subsequently reached out through [employer]. See [employer] encounter dated January 17, 2022    Junaid Pritchett PA-C  Gastroenterology  [facility]

## 2022-01-17 NOTE — TELEPHONE ENCOUNTER
Received results of recent colonoscopy and path.   Patient last evaluated by our GI office in May 2021.       Please advise patient to schedule follow up.     Junaid Pritchett PA-C  Gastroenterology  [facility]

## 2022-01-17 NOTE — PROGRESS NOTES
Katy is a 31 year old who is being evaluated via a billable telephone visit.      What phone number would you like to be contacted at? [phone]  How would you like to obtain your AVS? Billie      Attempted calling patient 2 times, unable to reach.

## 2022-01-17 NOTE — TELEPHONE ENCOUNTER
Called patient and spoke with her via . RN explained that Junaid Pritchett PA-C wanted to schedule follow up after reviewing her colonoscopy results. Patient reports she now lives in [place] and cannot be here for follow RN. Asked how long she will be there? She states she is stuck there for 3 week because her car is broken down and she is with her friend. RN clarified if she is staying there to live or coming back? Patient hung up on RN and .     Ruma Elliott RN, BSN  Gastroenterology Nurse Care Coordinator  Phone: [phone]  Fax: [phone]

## 2022-01-18 VITALS
HEIGHT: 62 IN | DIASTOLIC BLOOD PRESSURE: 87 MMHG | HEART RATE: 106 BPM | OXYGEN SATURATION: 95 % | RESPIRATION RATE: 18 BRPM | BODY MASS INDEX: 34.04 KG/M2 | TEMPERATURE: 97.8 F | WEIGHT: 185 LBS | SYSTOLIC BLOOD PRESSURE: 124 MMHG

## 2022-01-18 LAB
ATRIAL RATE - MUSE: 120 BPM
DIASTOLIC BLOOD PRESSURE - MUSE: NORMAL MMHG
FLUAV RNA SPEC QL NAA+PROBE: NEGATIVE
FLUBV RNA RESP QL NAA+PROBE: NEGATIVE
HOLD SPECIMEN: NORMAL
INTERPRETATION ECG - MUSE: NORMAL
P AXIS - MUSE: 29 DEGREES
PR INTERVAL - MUSE: 120 MS
QRS DURATION - MUSE: 78 MS
QT - MUSE: 326 MS
QTC - MUSE: 460 MS
R AXIS - MUSE: 44 DEGREES
RSV RNA SPEC NAA+PROBE: NEGATIVE
SARS-COV-2 RNA RESP QL NAA+PROBE: NEGATIVE
SYSTOLIC BLOOD PRESSURE - MUSE: NORMAL MMHG
T AXIS - MUSE: 35 DEGREES
VENTRICULAR RATE- MUSE: 120 BPM

## 2022-01-18 PROCEDURE — 250N000011 HC RX IP 250 OP 636: Performed by: EMERGENCY MEDICINE

## 2022-01-18 PROCEDURE — 96376 TX/PRO/DX INJ SAME DRUG ADON: CPT | Performed by: EMERGENCY MEDICINE

## 2022-01-18 RX ORDER — ONDANSETRON 4 MG/1
4 TABLET, FILM COATED ORAL EVERY 8 HOURS PRN
Qty: 6 TABLET | Refills: 0 | Status: SHIPPED | OUTPATIENT
Start: 2022-01-18 | End: 2022-01-24

## 2022-01-18 RX ORDER — ONDANSETRON 2 MG/ML
4 INJECTION INTRAMUSCULAR; INTRAVENOUS ONCE
Status: COMPLETED | OUTPATIENT
Start: 2022-01-18 | End: 2022-01-18

## 2022-01-18 RX ORDER — HYDROCODONE BITARTRATE AND ACETAMINOPHEN 5; 325 MG/1; MG/1
1 TABLET ORAL EVERY 6 HOURS PRN
Qty: 2 TABLET | Refills: 0 | Status: SHIPPED | OUTPATIENT
Start: 2022-01-18 | End: 2022-01-31

## 2022-01-18 RX ADMIN — ONDANSETRON 4 MG: 2 INJECTION INTRAMUSCULAR; INTRAVENOUS at 00:24

## 2022-01-18 NOTE — ED PROVIDER NOTES
ED Provider Note  [facility]      History   No chief complaint on file.    HPI  Katy Islas is a 31 year old female with a PMH of Crohn's disease, C. difficile colitis, GERD, HTN, partial epilepsy, seizures, migraine, asthma, GLADYS, DVT on Eliquis, tobacco abuse, borderline personality disorder, bipolar disorder who presents the ED today complaining of chest pain and hemoptysis.  Patient states that she is allergic to IV contrast and has vomited from this in the past.  She states she is usually given steroids and Benadryl to prevent an allergic reaction.  She also states  that Zofran works for her nausea and Dilaudid for her pain.  She notes a hard time breathing when laying flat.  She states that she will be able to lay flat for a CT scan.    Patient was recently seen here in the ED on 1/15/2022 complaining of cough and chest pain. EKG showed sinus tachycardia without signs of underlying arrhythmias, normal intervals.  No evidence of ischemia.  Her troponin was negative. Chest x-ray showed elevated left hemidiaphragm, this appears chronic and unchanged, with no pneumothorax or infiltrates.  Patient was requesting Dilaudid for chest pain and left AGAINST MEDICAL ADVICE.    Past Medical History  Past Medical History:   Diagnosis Date     Abdominal pain 10/31- 11/4/2005    [facility] for [facility]     Allergic rhinitis, cause unspecified     Allergic rhinitis     Arthritis      C. difficile diarrhea     Past, no current diarrhea.     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     Migraine 07/21/12    D/C 07/22/12-[facility]     Migraine, unspecified, without mention of intractable migraine without mention of status migrainosus     Migraine     Mild intermittent asthma     mild intermittent     Mycoplasma infection in conditions classified elsewhere and of unspecified site      Other chronic pain     Back pain for 6 years     Renal disease     Kidney stones     Unspecified hearing loss     congenital hearing loss     Past Surgical History:   Procedure Laterality Date     AS REMOVAL OF COCCYX  10/18/2017     COLONOSCOPY  7/1/2009    [facility] [place].     COLONOSCOPY N/A 7/17/2019    Procedure: Colonoscopy, With Polypectomy And Biopsy;  Surgeon: Edwin Conde MD;  Location: [facility]     COLONOSCOPY N/A 1/11/2022    Procedure: COLONOSCOPY, WITH POLYPECTOMY AND BIOPSY;  Surgeon: Bob Ugarte DO;  Location: [facility]     COLONOSCOPY WITH CO2 INSUFFLATION N/A 7/17/2019    Procedure: COLONOSCOPY, WITH CO2 INSUFFLATION;  Surgeon: Edwin Conde MD;  Location: [facility]     COMBINED ESOPHAGOSCOPY, GASTROSCOPY, DUODENOSCOPY (EGD) WITH CO2 INSUFFLATION N/A 4/12/2019    Procedure: COMBINED ESOPHAGOSCOPY, GASTROSCOPY, DUODENOSCOPY (EGD) WITH CO2 INSUFFLATION;  Surgeon: Edwin Conde MD;  Location: [facility] OR     ESOPHAGOSCOPY, GASTROSCOPY, DUODENOSCOPY (EGD), COMBINED N/A 4/12/2019    Procedure: Combined Esophagoscopy, Gastroscopy, Duodenoscopy (Egd), Biopsy Single Or Multiple;  Surgeon: Edwin Conde MD;  Location: [facility]     FISTULOTOMY RECTUM N/A 4/30/2020    Procedure: EXAM UNDER ANESTHESIA, ANUS, parital fistulotomy;  Surgeon: Valentin Sanchez MD;  Location: [facility]     FUSION SPINE POSTERIOR MINIMALLY INVASIVE ONE LEVEL N/A 2/23/2017    Procedure: FUSION SPINE  POSTERIOR MINIMALLY INVASIVE ONE LEVEL;  L4-5 Oblique Lateral Lumbar Interbody Fusion   Epidural steroid injection.   Transpedicular Bone marrow aspiration;  Surgeon: Jeniffer Eugene MD;  Location: [facility] OR     HC EEG AWAKE AND SLEEP      abnormal     HC MRI BRAIN W/O CONTRAST  12/00    normal     HC REMOVAL GALLBLADDER  8/5/2009    [facility], [facility].      [facility] ENDOSCOPY DIAG W BIOPSY  11/11/09    Normal esophagus     ORTHOPEDIC SURGERY  October 19,2011    diskectomy L4-L5     ZZC FUSION OF SACROILIAC JOINT  10/26/2018     ZZ COLONOSCOPY THRU STOMA WITH BIOPSY  10/29/2003    Impression is that of normal appearing colonoscopy, without evidence of rectal bleeding.     [facility]  10/00    normal     ZZ COLONOSCOPY THRU STOMA, DIAGNOSTIC  Oct 2009    Dr López- normal     [facility] UGI ENDOSCOPY, SIMPLE EXAM  7/00, 10/00    mild chronic esophagitis and duodenitis, neg H pylori     [facility] UGI ENDOSCOPY, SIMPLE EXAM  01/20/2005    Esophagogastroduodenoscopy, colonoscopy with biopsies.  [facility] UGI ENDOSCOPY, SIMPLE EXAM  7/1/2009    [facility], [facility].     [facility] UGI ENDOSCOPY, SIMPLE EXAM  11/11/2009    attempted upper GI, pt. could not tolerate procedure:[facility]     acetaminophen-codeine (TYLENOL #3) 300-30 MG tablet  albuterol (PROAIR HFA/PROVENTIL HFA/VENTOLIN HFA) 108 (90 Base) MCG/ACT inhaler  ARIPiprazole (ABILIFY) 30 MG tablet  azelastine (OPTIVAR) 0.05 % ophthalmic solution  bisacodyl (DULCOLAX) 5 MG EC tablet  budesonide (ENTOCORT EC) 3 MG EC capsule  buPROPion (WELLBUTRIN XL) 300 MG 24 hr tablet  busPIRone (BUSPAR) 15 MG tablet  clonazePAM (KLONOPIN) 1 MG tablet  cyclobenzaprine (FLEXERIL) 10 MG tablet  dicyclomine (BENTYL) 20 MG tablet  DULoxetine (CYMBALTA) 30 MG capsule  ELIQUIS ANTICOAGULANT 2.5 MG tablet  EPINEPHrine (ANY BX GENERIC EQUIV) 0.3 MG/0.3ML injection 2-pack  Ferrous Sulfate (IRON PO)  furosemide (LASIX)  20 MG tablet  gabapentin (NEURONTIN) 300 MG capsule  HYDROcodone-acetaminophen (NORCO) 5-325 MG tablet  HYDROmorphone (DILAUDID) 2 MG tablet  lidocaine (XYLOCAINE) 2 % solution  mesalamine (CANASA) 1000 MG suppository  mesalamine ER (PENTASA) 500 MG CR capsule  metoprolol succinate ER (TOPROL-XL) 50 MG 24 hr tablet  NIFEdipine ER OSMOTIC (PROCARDIA XL) 60 MG 24 hr tablet  ondansetron (ZOFRAN-ODT) 4 MG ODT tab  pantoprazole (PROTONIX) 40 MG EC tablet  polyethylene glycol (MIRALAX) 17 g packet  polyethylene glycol (MIRALAX) 17 GM/Dose powder  rizatriptan (MAXALT-MLT) 5 MG ODT  sucralfate (CARAFATE) 1 GM tablet      Allergies   Allergen Reactions     Iohexol Hives     Other reaction(s): Hives  IV contrast; patient states she tolerates contrast if she gets benadryl before  IV contrast; patient states she tolerates contrast if she gets benadryl before     Other Environmental Allergy Rash     Plastic tape     Baclofen      hives     Bees Hives, Swelling and Difficulty breathing     Contrast Dye      Hives,   Updated 5/10/2016 CT Contrast.     Iodine Hives     Metoclopramide      Other reaction(s): Tremors  LW Reaction: shaking/sweating     Midazolam      Other reaction(s): Agitation     Monosodium Glutamate      Morphine Other (See Comments)     Difficulty with urination     Nsaids Other (See Comments)     GI BLEED x2     Other (Do Not Use) Other (See Comments)     Xanaflex- pt becomes disoriented and loses bladder control     Reglan [Metoclopramide Hcl] Other (See Comments)     shaking     Soma [Carisoprodol] Visual Disturbance     Sleep walking     Tizanidine      Topamax Other (See Comments)     Topamax [Topiramate] Nausea     Tingling  GI/Vomit     Tramadol      Severe Headache, Seizure Risk     Tylenol W/Codeine [Acetaminophen-Codeine] Nausea and Itching     Tylenol 3     Versed Other (See Comments)     Zolmitriptan      Makes face feel like its twitching     Droperidol Anxiety     Flu Virus Vaccine Rash      Arm  "swelling     Family History  Family History   Problem Relation Age of Onset     Gastrointestinal Disease Brother         severe Crohn's     Neurologic Disorder Brother         Seizures post head injury     Depression Brother      Substance Abuse Brother      Genitourinary Problems Father         kidney stones     Diabetes Father      Heart Disease Father         Open heart surgery     Breast Cancer Maternal Grandmother      Parkinsonism Maternal Grandmother      Cerebrovascular Disease Paternal Grandmother      Cancer Maternal Grandfather         Lung     Cardiovascular Paternal Grandfather         Heart Attack     Substance Abuse Mother         in recovery x1 year      Lung Cancer Paternal Uncle      Cancer Paternal Uncle      Lung Cancer Maternal Uncle      Colon Cancer Maternal Uncle      Social History   Social History     Tobacco Use     Smoking status: Former Smoker     Packs/day: 0.25     Years: 5.00     Pack years: 1.25     Types: Cigarettes     Smokeless tobacco: Never Used   Vaping Use     Vaping Use: Former     Substances: CBD     Devices: Disposable, Pre-filled or refillable cartridge   Substance Use Topics     Alcohol use: Not Currently     Comment: Not since last July 2018     Drug use: Not Currently     Types: Marijuana     Comment: Medical Marijuana currently-- More CBD      Past medical history, past surgical history, medications, allergies, family history, and social history were reviewed with the patient. No additional pertinent items.       Review of Systems  {Complete vs limited [facility]:796469::\"A complete review of systems was performed with pertinent positives and negatives noted in the HPI, and all other systems negative.\"}    Physical Exam      Physical Exam  ***  ED Course      Procedures       {ED Course Selections:311478}              No results found for any visits on 01/17/22.  Medications - No data to display     Assessments & Plan (with Medical Decision Making)   ***    I have reviewed " the nursing notes. I have reviewed the findings, diagnosis, plan and need for follow up with the patient.    New Prescriptions    No medications on file       Final diagnoses:   None       --  ***  [facility] EMERGENCY DEPARTMENT  1/17/2022

## 2022-01-18 NOTE — TELEPHONE ENCOUNTER
See Nurse Triage call from today. She called back and spoke to another RN. Going to the ED.      Pt is calling.    Pt is currently in the state of [place].    Pt was seen in the ED on 01/15/2022.  Not feeling well. Coughed up a few specks of blood. Still with chest pain as well off and on.  That has continued. Not all of the time.   Bright red blood seen in her mucous when she coughed. Few red streaks seen. Thick green mucous as well.  Runny nose, nasal congestion.  Currently taking Eliquis for past DVT.  Denies fever.   Chest pain has been off and on for 1 week now and is not worse, but is the same as usual.  Oxygen is 98% on room air. No wheezing or crackles felt.  Shortness of breath is worse than her baseline. Heaviness in the chest. Harder to breathe when laying down.  COVID, Influenza, RSV all negative. Chest Xray-No PNA.    Care advice reviewed. I advised her that I would have to page the on call and then give her a call back with further care advice.    8:30pm-Call to on call physician, Dr Moo Moses.  Due to history, she should be seen in the ED with worsening shortness of breath, chest pain. If symptoms are not worse, different, than when last seen in the ED, ok to be seen tomorrow in clinic, but should be seen.    9:01pm-Call back to the patient. No answer. Message left with the relay service to have her give us a call back. Phone number given.       COVID 19 Nurse Triage Plan/Patient Instructions    Please be aware that novel coronavirus (COVID-19) may be circulating in the community. If you develop symptoms such as fever, cough, or SOB or if you have concerns about the presence of another infection including coronavirus (COVID-19), please contact your health care provider or visit [email or web address].     Disposition/Instructions    ED Visit recommended. Follow protocol based instructions.     Bring Your Own Device:  Please also bring your smart device(s) (smart phones, tablets, laptops) and their  charging cables for your personal use and to communicate with your care team during your visit.    Thank you for taking steps to prevent the spread of this virus.  o Limit your contact with others.  o Wear a simple mask to cover your cough.  o Wash your hands well and often.    Resources    [facility]: About COVID-19: [email or web address]    CDC: What to Do If You're Sick: www.cdc.gov/coronavirus/2019-ncov/about/steps-when-sick.html    CDC: Ending Home Isolation: www.cdc.gov/coronavirus/2019-ncov/hcp/disposition-in-home-patients.html     CDC: Caring for Someone: www.cdc.gov/coronavirus/2019-ncov/if-you-are-sick/care-for-someone.html     [facility]: Interim Guidance for Hospital Discharge to Home: www.[facility].[place]/diseases/coronavirus/hcp/hospdischarge.pdf    [facility] clinical trials (COVID-19 research studies): [email or web address]-clinical-trials     Below are the COVID-19 hotlines at the [place] Department of Health ([facility]). Interpreters are available.   o For health questions: Call 801-352-8415 or 1-402.140.7924 (7 a.m. to 7 p.m.)  o For questions about schools and childcare: Call 145-592-4857 or 1-375.478.9065 (7 a.m. to 7 p.m.)     Reason for Disposition    [1] MILD difficulty breathing (e.g., minimal/no SOB at rest, SOB with walking, pulse <100) AND [2] NEW-onset or WORSE than normal    Additional Information    Negative: [1] Breathing stopped AND [2] hasn't returned    Negative: Choking on something    Negative: Severe difficulty breathing (e.g., struggling for each breath, speaks in single words)    Negative: Bluish (or gray) lips or face now    Negative: Difficult to awaken or acting confused (e.g., disoriented, slurred speech)    Negative: Passed out (i.e., lost consciousness, collapsed and was not responding)    Negative: Wheezing started suddenly after medicine, an allergic food or bee sting    Negative: Stridor    Negative: Slow, shallow and weak breathing     "Negative: Sounds like a life-threatening emergency to the triager    Negative: Chest pain    Negative: [1] Wheezing (high pitched whistling sound) AND [2] previous asthma attacks or use of asthma medicines    Negative: [1] Difficulty breathing AND [2] only present when coughing    Negative: [1] Difficulty breathing AND [2] only from stuffy or runny nose    Negative: [1] Difficulty breathing AND [2] within 14 days of COVID-19 Exposure    Negative: [1] MODERATE difficulty breathing (e.g., speaks in phrases, SOB even at rest, pulse 100-120) AND [2] NEW-onset or WORSE than normal    Negative: Wheezing can be heard across the room    Negative: Drooling or spitting out saliva (because can't swallow)    Negative: History of prior \"blood clot\" in leg or lungs (i.e., deep vein thrombosis, pulmonary embolism)    Negative: History of inherited increased risk of blood clots (e.g., Factor 5 Leiden, Anti-thrombin 3, Protein C or Protein S deficiency, Prothrombin mutation)    Negative: Major surgery in the past month    Negative: Hip or leg fracture (broken bone) in past month (or had cast on leg or ankle in past month)    Negative: Illness requiring prolonged bedrest in past month (e.g., immobilization, long hospital stay)    Negative: Long-distance travel in past month (e.g., car, bus, train, plane; with trip lasting 6 or more hours)    Negative: Extra heart beats OR irregular heart beating   (i.e., \"palpitations\")    Negative: Fever > 103 F (39.4 C)    Negative: [1] Fever > 101 F (38.3 C) AND [2] age > 60    Negative: [1] Fever > 100.0 F (37.8 C) AND [2] bedridden (e.g., nursing home patient, CVA, chronic illness, recovering from surgery)    Negative: [1] Fever > 100.0 F (37.8 C) AND [2] diabetes mellitus or weak immune system (e.g., HIV positive, cancer chemo, splenectomy, organ transplant, chronic steroids)    Negative: [1] Periods where breathing stops and then resumes normally AND [2] bedridden (e.g., nursing home patient, " CVA)    Negative: Pregnant or postpartum (< 1 month since delivery)    Negative: Patient sounds very sick or weak to the triager    Protocols used: BREATHING DIFFICULTY-A-JAMAL Yoder RN  [facility] Nurse Advisor  1/17/2022 at 9:14 PM

## 2022-01-18 NOTE — TELEPHONE ENCOUNTER
Follow up call from a  left by another nurse triage advisor.  Patient states symptoms are much worse.  Per the providers recommendations patient should be seen in the ED.  Patient will go to the ED now.    COVID 19 Nurse Triage Plan/Patient Instructions    Please be aware that novel coronavirus (COVID-19) may be circulating in the community. If you develop symptoms such as fever, cough, or SOB or if you have concerns about the presence of another infection including coronavirus (COVID-19), please contact your health care provider or visit [email or web address].     Disposition/Instructions    ED Visit recommended. Follow protocol based instructions.     Bring Your Own Device:  Please also bring your smart device(s) (smart phones, tablets, laptops) and their charging cables for your personal use and to communicate with your care team during your visit.    Thank you for taking steps to prevent the spread of this virus.  o Limit your contact with others.  o Wear a simple mask to cover your cough.  o Wash your hands well and often.    Resources    [facility]: About COVID-19: [email or web address]    CDC: What to Do If You're Sick: www.cdc.gov/coronavirus/2019-ncov/about/steps-when-sick.html    CDC: Ending Home Isolation: www.cdc.gov/coronavirus/2019-ncov/hcp/disposition-in-home-patients.html     CDC: Caring for Someone: www.cdc.gov/coronavirus/2019-ncov/if-you-are-sick/care-for-someone.html     [facility]: Interim Guidance for Hospital Discharge to Home: www.health.[facility].[place]/diseases/coronavirus/hcp/hospdischarge.pdf    [facility] clinical trials (COVID-19 research studies): clinicalaffairs.[facility]/umn-clinical-trials     Below are the COVID-19 hotlines at the [facility] of Health ([facility]). Interpreters are available.   o For health questions: Call 563-931-8442 or 1-516.442.6439 (7 a.m. to 7 p.m.)  o For questions about schools and childcare: Call 828-742-7615 or 1-599.915.1973  (7 a.m. to 7 p.m.)                     Loida Parker RN   01/17/22 9:49 PM  [facility] Nurse Advisor    Reason for Disposition    Caller has already spoken with another triager or PCP AND has further questions AND triager able to answer questions.    Additional Information    Negative: Caller is angry or rude (e.g., hangs up, verbally abusive, yelling)    Negative: Caller hangs up    Negative: Caller has already spoken with the PCP and has no further questions.    Negative: Caller has already spoken with another triager and has no further questions.    Protocols used: NO CONTACT OR DUPLICATE CONTACT CALL-A-

## 2022-01-18 NOTE — TELEPHONE ENCOUNTER
Review encounter started on 1/17/22 that gastro team reached out to patient.   Closing encounter at this time.   EDYTA العليN, RN, PHN  [place]  January 18, 2022

## 2022-01-18 NOTE — ED TRIAGE NOTES
"Ambulatory to triage. Pt states she started coughing up blood tonight, chest hurts worse than a few days ago, and also coughing up \"green stuff.\". Chest pain is in left chest.      paged.   "

## 2022-01-18 NOTE — ED PROVIDER NOTES
Emergency Department Visit Note      Patient Name:  Katy Islas   Medical Record Number:  [id number]   YOB: 1990      Date:  1/17/2022      Chief Complaint: Chest pain, hemoptysis     HPI:  Katy Islas is a 31 year old female with a PMH of Crohn's disease, C. difficile colitis, GERD, HTN, partial epilepsy, seizures, migraine, asthma, GLADYS, DVT on Eliquis, tobacco abuse, borderline personality disorder, bipolar disorder who presents the ED today complaining of chest pain and hemoptysis.  Patient states that she is allergic to IV contrast and has vomited from this in the past.  She states she is usually given steroids and Benadryl to prevent an allergic reaction.  She also states  that Zofran works for her nausea and Dilaudid for her pain.  She notes a hard time breathing when laying flat.  She states that she will be able to lay flat for a CT scan.  Patient reports that her cough has been worsening lately.  She states that yesterday she began coughing up green-colored phlegm, and today coughed up a little bit of blood.  She again notes that she has trouble breathing laying flat and endorses a heavy feeling on her chest.  Patient denies fever or chills.    Patient was recently seen here in the ED on 1/15/2022 complaining of cough and chest pain. EKG showed sinus tachycardia without signs of underlying arrhythmias, normal intervals.  No evidence of ischemia.  Her troponin was negative. Chest x-ray showed elevated left hemidiaphragm, this appears chronic and unchanged, with no pneumothorax or infiltrates.  Patient was requesting Dilaudid for chest pain and left AGAINST MEDICAL ADVICE.             PMH:   Past Medical History:   Diagnosis Date     Abdominal pain 10/31- 11/4/2005    [facility] admit for Crohn's     Allergic rhinitis, cause unspecified     Allergic rhinitis     Arthritis      C. difficile diarrhea     Past, no current diarrhea.     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 neurologic eval     Migraine 07/21/12    D/C 07/22/12-[facility]     Migraine, unspecified, without mention of intractable migraine without mention of status migrainosus     Migraine     Mild intermittent asthma     mild intermittent     Mycoplasma infection in conditions classified elsewhere and of unspecified site      Other chronic pain     Back pain for 6 years     Renal disease     Kidney stones     Unspecified hearing loss     congenital hearing loss              PSH:   Past Surgical History:   Procedure Laterality Date     AS REMOVAL OF COCCYX  10/18/2017     COLONOSCOPY  7/1/2009    [facility] [place].     COLONOSCOPY N/A 7/17/2019    Procedure: Colonoscopy, With Polypectomy And Biopsy;  Surgeon: Edwin Conde MD;  Location:  OR     COLONOSCOPY N/A 1/11/2022    Procedure: COLONOSCOPY, WITH POLYPECTOMY AND BIOPSY;  Surgeon: Bob Ugarte DO;  [facility]: [facility]     COLONOSCOPY WITH CO2 INSUFFLATION N/A 7/17/2019    Procedure: COLONOSCOPY, WITH CO2 INSUFFLATION;  Surgeon: Edwin Conde MD;  Location: [facility] OR     COMBINED ESOPHAGOSCOPY, GASTROSCOPY, DUODENOSCOPY (EGD) WITH CO2 INSUFFLATION N/A 4/12/2019    Procedure: COMBINED ESOPHAGOSCOPY, GASTROSCOPY, DUODENOSCOPY (EGD) WITH CO2 INSUFFLATION;  Surgeon: Edwin Conde MD;  Location:  OR     ESOPHAGOSCOPY, GASTROSCOPY, DUODENOSCOPY (EGD), COMBINED N/A  4/12/2019    Procedure: Combined Esophagoscopy, Gastroscopy, Duodenoscopy (Egd), Biopsy Single Or Multiple;  Surgeon: Edwin Conde MD;  Location: [facility] OR     FISTULOTOMY RECTUM N/A 4/30/2020    Procedure: EXAM UNDER ANESTHESIA, ANUS, parital fistulotomy;  Surgeon: Valentin Sanchez MD;  Location: [facility] OR     FUSION SPINE POSTERIOR MINIMALLY INVASIVE ONE LEVEL N/A 2/23/2017    Procedure: FUSION SPINE POSTERIOR MINIMALLY INVASIVE ONE LEVEL;  L4-5 Oblique Lateral Lumbar Interbody Fusion   Epidural steroid injection.   Transpedicular Bone marrow aspiration;  Surgeon: Jeniffer Eugene MD;  Location: [facility] OR     HC EEG AWAKE AND SLEEP      abnormal     HC MRI BRAIN W/O CONTRAST  12/00    normal     HC REMOVAL GALLBLADDER  8/5/2009    [facility], [facility].     HC UGI ENDOSCOPY DIAG W BIOPSY  11/11/09    Normal esophagus     ORTHOPEDIC SURGERY  October 19,2011    diskectomy L4-L5     ZZC FUSION OF SACROILIAC JOINT  10/26/2018     ZZ COLONOSCOPY THRU STOMA WITH BIOPSY  10/29/2003    Impression is that of normal appearing colonoscopy, without evidence of rectal bleeding.     [facility] COLONOSCOPY THRU STOMA, DIAGNOSTIC  10/00    normal     [facility] COLONOSCOPY THRU STOMA, DIAGNOSTIC  Oct 2009    Dr López- normal     [facility] UGI ENDOSCOPY, SIMPLE EXAM  7/00, 10/00    mild chronic esophagitis and duodenitis, neg H pylori     [facility] UGI ENDOSCOPY, SIMPLE EXAM  01/20/2005    Esophagogastroduodenoscopy, colonoscopy with biopsies.  [facility] UGI ENDOSCOPY, SIMPLE EXAM  7/1/2009    [facility], [facility].     [facility] UGI ENDOSCOPY, SIMPLE EXAM  11/11/2009    attempted upper GI, pt. could not tolerate procedure:[facility]         Meds:   Current Outpatient Medications   Medication Sig Dispense Refill     acetaminophen-codeine (TYLENOL #3) 300-30 MG tablet Take 1 tablet by mouth every 8 hours as needed for severe pain 25 tablet 0     albuterol (PROAIR HFA/PROVENTIL  HFA/VENTOLIN HFA) 108 (90 Base) MCG/ACT inhaler Inhale 2 puffs into the lungs every 6 hours as needed for shortness of breath / dyspnea or wheezing 18 g 3     ARIPiprazole (ABILIFY) 30 MG tablet Take 1 tablet (30 mg) by mouth At Bedtime 30 tablet 0     azelastine (OPTIVAR) 0.05 % ophthalmic solution Place 1 drop into both eyes 2 times daily 6 mL 11     bisacodyl (DULCOLAX) 5 MG EC tablet Take as directed in [facility] patient instructions 2 tablet 0     budesonide (ENTOCORT EC) 3 MG EC capsule Take 3 capsules (9 mg) by mouth every morning for 60 days, THEN 2 capsules (6 mg) every morning for 15 days, THEN 1 capsule (3 mg) every morning for 15 days. 225 capsule 0     buPROPion (WELLBUTRIN XL) 300 MG 24 hr tablet Take 1 tablet (300 mg) by mouth every morning 90 tablet 3     busPIRone (BUSPAR) 15 MG tablet Take 15 mg by mouth 3 times daily        clonazePAM (KLONOPIN) 1 MG tablet Take 1 mg by mouth 2 times daily as needed for anxiety        cyclobenzaprine (FLEXERIL) 10 MG tablet Take 1 tablet (10 mg) by mouth 3 times daily as needed (pain) 45 tablet 0     dicyclomine (BENTYL) 20 MG tablet Take 1 tablet (20 mg) by mouth every 6 hours 90 tablet 3     DULoxetine (CYMBALTA) 30 MG capsule Take 2 capsules (60 mg) by mouth daily 180 capsule 1     ELIQUIS ANTICOAGULANT 2.5 MG tablet        EPINEPHrine (ANY BX GENERIC EQUIV) 0.3 MG/0.3ML injection 2-pack Inject 0.3 mLs (0.3 mg) into the muscle once as needed for anaphylaxis 0.6 mL 3     Ferrous Sulfate (IRON PO)        furosemide (LASIX) 20 MG tablet Take 20 mg by mouth daily        gabapentin (NEURONTIN) 300 MG capsule Take 1 capsule (300 mg) by mouth 3 times daily 270 capsule 0     HYDROcodone-acetaminophen (NORCO) 5-325 MG tablet Take 1 tablet by mouth every 6 hours as needed for severe pain 10 tablet 0     HYDROmorphone (DILAUDID) 2 MG tablet Take 1 tablet (2 mg) by mouth every 4 hours as needed for moderate to severe pain 12 tablet 0     lidocaine (XYLOCAINE) 2 % solution  Swish and swallow 15 mLs in mouth every 4 hours as needed for other (GERD) ; Max 8 doses/24 hour period. Mix with 15 mLs Maalox or Mylanta. 100 mL 3     mesalamine (CANASA) 1000 MG suppository Place 1 suppository (1,000 mg) rectally At Bedtime 30 suppository 1     mesalamine ER (PENTASA) 500 MG CR capsule Take 1,000 mg by mouth 3 times daily        metoprolol succinate ER (TOPROL-XL) 50 MG 24 hr tablet Take 50 mg by mouth daily        NIFEdipine ER OSMOTIC (PROCARDIA XL) 60 MG 24 hr tablet        ondansetron (ZOFRAN-ODT) 4 MG ODT tab Take 1 tablet (4 mg) by mouth every 6 hours as needed for nausea 30 tablet 0     pantoprazole (PROTONIX) 40 MG EC tablet Take 1 tablet (40 mg) by mouth 2 times daily Take 30-60 minutes before a meal. 180 tablet 3     polyethylene glycol (MIRALAX) 17 g packet Take 17 g by mouth daily as needed for constipation 15 packet 0     polyethylene glycol (MIRALAX) 17 GM/Dose powder Take as directed in patient instructions. 476 g 0     rizatriptan (MAXALT-MLT) 5 MG ODT Take 1-2 tablets (5-10 mg) by mouth at onset of headache for migraine May repeat in 2 hours. Max 6 tablets/24 hours. 18 tablet 3     sucralfate (CARAFATE) 1 GM tablet Take 1 tablet (1 g) by mouth 4 times daily as needed (heartburn) 360 tablet 3         Allergies:   Allergies   Allergen Reactions     Iohexol Hives     Other reaction(s): Hives  IV contrast; patient states she tolerates contrast if she gets benadryl before  IV contrast; patient states she tolerates contrast if she gets benadryl before     Other Environmental Allergy Rash     Plastic tape     Baclofen      hives     Bees Hives, Swelling and Difficulty breathing     Contrast Dye      Hives,   Updated 5/10/2016 CT Contrast.     Iodine Hives     Metoclopramide      Other reaction(s): Tremors  LW Reaction: shaking/sweating     Midazolam      Other reaction(s): Agitation     Monosodium Glutamate      Morphine Other (See Comments)     Difficulty with urination     Nsaids  Other (See Comments)     GI BLEED x2     Other (Do Not Use) Other (See Comments)     Xanaflex- pt becomes disoriented and loses bladder control     Reglan [Metoclopramide Hcl] Other (See Comments)     shaking     Soma [Carisoprodol] Visual Disturbance     Sleep walking     Tizanidine      Topamax Other (See Comments)     Topamax [Topiramate] Nausea     Tingling  GI/Vomit     Tramadol      Severe Headache, Seizure Risk     Tylenol W/Codeine [Acetaminophen-Codeine] Nausea and Itching     Tylenol 3     Versed Other (See Comments)     Zolmitriptan      Makes face feel like its twitching     Droperidol Anxiety     Flu Virus Vaccine Rash      Arm swelling         Social History:   Social History     Tobacco Use     Smoking status: Former Smoker     Packs/day: 0.25     Years: 5.00     Pack years: 1.25     Types: Cigarettes     Smokeless tobacco: Never Used   Vaping Use     Vaping Use: Former     Substances: CBD     Devices: Disposable, Pre-filled or refillable cartridge   Substance Use Topics     Alcohol use: Not Currently     Comment: Not since last July 2018     Drug use: Not Currently     Types: Marijuana     Comment: Medical Marijuana currently-- More CBD         Family Hx:   Family History   Problem Relation Age of Onset     Gastrointestinal Disease Brother         severe Crohn's     Neurologic Disorder Brother         Seizures post head injury     Depression Brother      Substance Abuse Brother      Genitourinary Problems Father         kidney stones     Diabetes Father      Heart Disease Father         Open heart surgery     Breast Cancer Maternal Grandmother      Parkinsonism Maternal Grandmother      Cerebrovascular Disease Paternal Grandmother      Cancer Maternal Grandfather         Lung     Cardiovascular Paternal Grandfather         Heart Attack     Substance Abuse Mother         in recovery x1 year      Lung Cancer Paternal Uncle      Cancer Paternal Uncle      Lung Cancer Maternal Uncle      Colon Cancer  "Maternal Uncle          Review of Systems:   10 point ROS negative with exception of those listed in the HPI.      Physical Exam:   BP (!) 142/95   Pulse (!) 122   Temp 97.8  F (36.6  C) (Oral)   Resp 18   Ht 1.575 m (5' 2\")   Wt 83.9 kg (185 lb)   SpO2 98%   BMI 33.84 kg/m        General: Alert,  sitting on the bed in NAD   Neuro: awake alert moving extremities x 4 face symetrical   Head: atraumatic   Eyes: palpebral conjunctiva normal - not pale   Mouth/Throat: mucous membranes moist   Chest/Pulm: clear BS bilaterally, no chest wall tenderness to palpation   Cardiovascular: regular; S1 S2 no murmur;  cap refill < 2secs to the digits   Abdomen:  soft non-tender +BS   Extremities: no LE edema, no calf tenderness   Skin: non diaphoretic  warm and dry        MDM:   Nursing note were reviewed.  Past Medical records were reviewed. Differential Diagnosis for this patient's chest pain includes ischemic chest pain pulmonary embolism aortic dissection pneumonia esophageal rupture pericarditis chest wall pain or referred pain from biliary colic.      Soon after presentation to the ER patient was placed on a cardiac monitor, provided oxygen via nasal canula and an EKG was ordered which showed sinus tachycardia with no signs of acute ischemia or infarction.       Patient is moderate risk per well's criteria so we discussed risks and benefits of CT to evaluate for pulmonary embolism.  Patient is in agreement with plan however has contrast allergy where she had hives in the past.  She stated Benadryl prior to her contrast has worked fine in the past.  We also discussed with [place] radiology, Curt Behrens ([phone]) who agreed Benadryl should be fine in this case.         I do not believe this patient has a thoracic aortic dissection as the pain is not ripping or tearing it does not go to her back.  This patient does not have a history of poorly controlled high blood pressure, and CTA showed no evidence of dissection. "   I do not believe that her chest pain is secondary to pneumonia as this patient does not have fever and CT did not demonstrate infiltrates.  I do not believe her pain is due to esophageal rupture as there was no preceding  forceful vomitting or retching and the CTdoes not show mediastinal free air.   I do not believe it is due to pericarditis as there is no evidence of UT depression on the electrocardiogram and the pain does not seem to be markedly changed with position.  I do not believe that this patient has referred pain from the ABD as her ABD exam is benign.  Patient's cardiac risk score is  low. at this point I believe the most likely source of this patient's pain is unclear, but we discussed that life threatening causes have been evaluated and I feel patient is safe to be discharged to home at this time.     Patient discharged in good condition with questions answered. She was given [facility] discharge instructions and follow-up recommendations. Patient will return to the ED if symptoms worsen or She develops any of the concerning signs/symptoms as outlined in the EPIC d/c instructions. Otherwise she will follow up in clinic as recommended in the d/c instructions.             Assessment:   (R04.2) Hemoptysis  (R07.9) Chest pain, unspecified type       Plan:   Discharge to home with close follow up in Primary care clinic  Return to the ER with any worsening symptoms        Condition on disposition: Alicia Rodriguez MD  01/18/22 [id number]

## 2022-01-18 NOTE — DISCHARGE INSTRUCTIONS
Thank you for choosing [facility] for your care. It was a pleasure taking care of you today in our Emergency Department.     Please follow up with your primary care provider in the next 1-2 days. However, if you have continued worsening symptoms, please return to the emergency department.

## 2022-01-18 NOTE — TELEPHONE ENCOUNTER
She needs to talk to a doctor now-   She says there is a tear and it is painful when she uses the bathroom - still oozy green pus.   It had closed and now has burst open.  It is very gross and looks like there is red tissue coming out.   Colorectal cancelled and they wont see her.  She states they wont give her anything to feel better and she is fed up with her care.    Huddled with [facility]- she said we can triage   Jenni Luo MA       normal...

## 2022-01-18 NOTE — TELEPHONE ENCOUNTER
Medication has been prescribed.   EDYTA العليN, RN, PHN  [place]  January 18, 2022

## 2022-01-19 ENCOUNTER — APPOINTMENT (OUTPATIENT)
Dept: CT IMAGING | Facility: CLINIC | Age: 32
End: 2022-01-19
Attending: FAMILY MEDICINE
Payer: MEDICARE

## 2022-01-19 ENCOUNTER — HOSPITAL ENCOUNTER (EMERGENCY)
Facility: CLINIC | Age: 32
Discharge: HOME | End: 2022-01-19
Attending: FAMILY MEDICINE | Admitting: FAMILY MEDICINE
Payer: MEDICARE

## 2022-01-19 ENCOUNTER — OFFICE VISIT (OUTPATIENT)
Dept: INTERPRETER SERVICES | Facility: CLINIC | Age: 32
End: 2022-01-19
Payer: MEDICARE

## 2022-01-19 VITALS
SYSTOLIC BLOOD PRESSURE: 129 MMHG | OXYGEN SATURATION: 97 % | DIASTOLIC BLOOD PRESSURE: 92 MMHG | HEART RATE: 99 BPM | RESPIRATION RATE: 18 BRPM | TEMPERATURE: 97.9 F

## 2022-01-19 DIAGNOSIS — N76.0 VAGINITIS AND VULVOVAGINITIS: ICD-10-CM

## 2022-01-19 DIAGNOSIS — R10.13 ABDOMINAL PAIN, EPIGASTRIC: ICD-10-CM

## 2022-01-19 LAB
ALBUMIN SERPL-MCNC: 3.4 G/DL (ref 3.4–5)
ALP SERPL-CCNC: 91 U/L (ref 40–150)
ALT SERPL W P-5'-P-CCNC: 26 U/L (ref 0–50)
ANION GAP SERPL CALCULATED.3IONS-SCNC: 3 MMOL/L (ref 3–14)
AST SERPL W P-5'-P-CCNC: 7 U/L (ref 0–45)
BASOPHILS # BLD AUTO: 0 10E3/UL (ref 0–0.2)
BASOPHILS NFR BLD AUTO: 0 %
BILIRUB SERPL-MCNC: 0.2 MG/DL (ref 0.2–1.3)
BUN SERPL-MCNC: 16 MG/DL (ref 7–30)
CALCIUM SERPL-MCNC: 9.2 MG/DL (ref 8.5–10.1)
CHLORIDE BLD-SCNC: 107 MMOL/L (ref 94–109)
CO2 SERPL-SCNC: 28 MMOL/L (ref 20–32)
CREAT SERPL-MCNC: 0.73 MG/DL (ref 0.52–1.04)
EOSINOPHIL # BLD AUTO: 0.1 10E3/UL (ref 0–0.7)
EOSINOPHIL NFR BLD AUTO: 2 %
ERYTHROCYTE [DISTWIDTH] IN BLOOD BY AUTOMATED COUNT: 12.9 % (ref 10–15)
GFR SERPL CREATININE-BSD FRML MDRD: >90 ML/MIN/1.73M2
GLUCOSE BLD-MCNC: 91 MG/DL (ref 70–99)
HCG UR QL: NEGATIVE
HCT VFR BLD AUTO: 38.6 % (ref 35–47)
HGB BLD-MCNC: 13.4 G/DL (ref 11.7–15.7)
IMM GRANULOCYTES # BLD: 0 10E3/UL
IMM GRANULOCYTES NFR BLD: 0 %
INTERNAL QC OK POCT: NORMAL
LIPASE SERPL-CCNC: 124 U/L (ref 73–393)
LYMPHOCYTES # BLD AUTO: 1.5 10E3/UL (ref 0.8–5.3)
LYMPHOCYTES NFR BLD AUTO: 29 %
MCH RBC QN AUTO: 32.8 PG (ref 26.5–33)
MCHC RBC AUTO-ENTMCNC: 34.7 G/DL (ref 31.5–36.5)
MCV RBC AUTO: 94 FL (ref 78–100)
MONOCYTES # BLD AUTO: 0.5 10E3/UL (ref 0–1.3)
MONOCYTES NFR BLD AUTO: 10 %
NEUTROPHILS # BLD AUTO: 3.1 10E3/UL (ref 1.6–8.3)
NEUTROPHILS NFR BLD AUTO: 59 %
NRBC # BLD AUTO: 0 10E3/UL
NRBC BLD AUTO-RTO: 0 /100
PLATELET # BLD AUTO: 275 10E3/UL (ref 150–450)
POCT KIT EXPIRATION DATE: NORMAL
POCT KIT LOT NUMBER: NORMAL
POTASSIUM BLD-SCNC: 4.8 MMOL/L (ref 3.4–5.3)
PROT SERPL-MCNC: 7 G/DL (ref 6.8–8.8)
RBC # BLD AUTO: 4.09 10E6/UL (ref 3.8–5.2)
SODIUM SERPL-SCNC: 138 MMOL/L (ref 133–144)
WBC # BLD AUTO: 5.2 10E3/UL (ref 4–11)

## 2022-01-19 PROCEDURE — 36415 COLL VENOUS BLD VENIPUNCTURE: CPT | Performed by: FAMILY MEDICINE

## 2022-01-19 PROCEDURE — 99284 EMERGENCY DEPT VISIT MOD MDM: CPT | Mod: 25 | Performed by: FAMILY MEDICINE

## 2022-01-19 PROCEDURE — 84155 ASSAY OF PROTEIN SERUM: CPT | Performed by: FAMILY MEDICINE

## 2022-01-19 PROCEDURE — 250N000011 HC RX IP 250 OP 636: Performed by: FAMILY MEDICINE

## 2022-01-19 PROCEDURE — 99284 EMERGENCY DEPT VISIT MOD MDM: CPT | Performed by: FAMILY MEDICINE

## 2022-01-19 PROCEDURE — 83690 ASSAY OF LIPASE: CPT | Performed by: FAMILY MEDICINE

## 2022-01-19 PROCEDURE — G1004 CDSM NDSC: HCPCS

## 2022-01-19 PROCEDURE — 85025 COMPLETE CBC W/AUTO DIFF WBC: CPT | Performed by: FAMILY MEDICINE

## 2022-01-19 PROCEDURE — 250N000013 HC RX MED GY IP 250 OP 250 PS 637: Performed by: FAMILY MEDICINE

## 2022-01-19 PROCEDURE — T1013 SIGN LANG/ORAL INTERPRETER: HCPCS | Mod: U3

## 2022-01-19 PROCEDURE — 81025 URINE PREGNANCY TEST: CPT | Performed by: FAMILY MEDICINE

## 2022-01-19 RX ORDER — ONDANSETRON 4 MG/1
8 TABLET, ORALLY DISINTEGRATING ORAL EVERY 8 HOURS PRN
Qty: 10 TABLET | Refills: 0 | Status: SHIPPED | OUTPATIENT
Start: 2022-01-19 | End: 2022-01-22

## 2022-01-19 RX ORDER — OXYCODONE HYDROCHLORIDE 5 MG/1
5 CAPSULE ORAL EVERY 4 HOURS PRN
Qty: 12 CAPSULE | Refills: 0 | Status: SHIPPED | OUTPATIENT
Start: 2022-01-19 | End: 2022-01-31

## 2022-01-19 RX ORDER — ONDANSETRON 8 MG/1
8 TABLET, ORALLY DISINTEGRATING ORAL ONCE
Status: COMPLETED | OUTPATIENT
Start: 2022-01-19 | End: 2022-01-19

## 2022-01-19 RX ORDER — OXYCODONE HYDROCHLORIDE 5 MG/1
10 TABLET ORAL ONCE
Status: COMPLETED | OUTPATIENT
Start: 2022-01-19 | End: 2022-01-19

## 2022-01-19 RX ORDER — METRONIDAZOLE 7.5 MG/G
GEL VAGINAL
Qty: 70 G | Refills: 0 | Status: SHIPPED | OUTPATIENT
Start: 2022-01-19 | End: 2022-01-24

## 2022-01-19 RX ADMIN — OXYCODONE HYDROCHLORIDE 10 MG: 5 TABLET ORAL at 11:55

## 2022-01-19 RX ADMIN — ONDANSETRON 8 MG: 8 TABLET, ORALLY DISINTEGRATING ORAL at 11:54

## 2022-01-19 ASSESSMENT — ENCOUNTER SYMPTOMS
ABDOMINAL PAIN: 1
DIARRHEA: 1
VOMITING: 1
DYSURIA: 0
NAUSEA: 1

## 2022-01-19 NOTE — ED PROVIDER NOTES
[facility] [place] EMERGENCY DEPARTMENT ([facility])       1/19/22  History     Chief Complaint   Patient presents with     Abdominal Pain     The history is provided by the patient and medical records. The history is limited by a language barrier. A  was used (ipad).     Katy Islas is a 31 year old female with a past medical history significant for Crohn's disease, C. difficile colitis, GERD, HTN, partial epilepsy, seizures, migraine, asthma, GLADYS, DVT on Eliquis, tobacco abuse, borderline personality disorder, bipolar disorder, and congenital hearing loss who presents to the Emergency Department for evaluation of abdominal pain. Patient recently had a colonoscopy which she states revealed some inflammation and ulcers after experiencing rectal bleeding. Patient has had ongoing abdominal pain, back pain, vomiting, and diarrhea due to a Crohn's flair and was recently prescribed enticort. Patient has not taken it since it is unavailable at the pharmacy. Patient was told her prescription would be available today. She currently does not have an assigned specialist for Crohn's disease management. She does take Norco for pain and zofran for nausea.     Patient has also had recent vaginal discharge and a history of bacterial vaginosis. She currently lives in [place] and has a gynecologist appointment today, but her car broke down while visiting a friend here, so she has been stuck in [place] for 1 month and may miss her appointment. Patient denies dysuria, recent sexual activity, and has not had a menstrual cycle in 3-4 years.         Per review of the patient's medical record, they recently seen at [facility] on 1/17/22 for evaluation of chest pain and hemoptysis. Patient underwent CT chest pulmonary embolism with contrast. Labs were notable for negative influenza A and B, negative Covid-19, negative troponin.  EKG showed sinus tachycardia with no signs of acute ischemia or infarction. Patient was  subsequently treated with oxygen via nasal canula. Patient was subsequently discharged home with instructions to return to ED with worsening symptoms.    CT CHEST PULMONARY EMBOLISM W CONTRAST - [facility] (1/17/22)  IMPRESSION:  1.  Similar moderate left basilar consolidation and fibroatelectasis with increased elevation of left hemidiaphragm. Mild fibroatelectasis right hemithorax. No effusion.  2.  No pulmonary embolus aortic aneurysm or dissection.  --------------------------------------------------------------------------------  Patient was recently seen here in the ED on 1/15/2022 complaining of cough and chest pain. EKG showed sinus tachycardia without signs of underlying arrhythmias, normal intervals.  No evidence of ischemia.  Her troponin was negative. Chest x-ray showed elevated left hemidiaphragm, this appears chronic and unchanged, with no pneumothorax or infiltrates.  Patient was requesting Dilaudid for chest pain and left AGAINST MEDICAL ADVICE.  ---------------------------------------------------------------------------------  Per review of the patient's medical record, they were recently seen at [facility] on 1/11/22 for a scheduled colonoscopy. Prior to admission, they were noted to have presented with chronic diarrhea and rectal bleeding. Patient was subsequently discharged home.    COLONOSCOPY WITH POLYPECTOMY AND BIOPSY - [facility] (1/11/22)  IMPRESSION:  - Preparation of the colon was poor.   - The examined portion of the ileum was normal.   - Normal mucosa in the sigmoid colon, in the descending colon, in the transverse colon, in the ascending colon, in the cecum and at the ileocecal valve. Biopsied.   - Erythematous mucosa in the rectum with oozing. Biopsied.       Past Medical History  Past Medical History:   Diagnosis Date     Abdominal pain 10/31- 11/4/2005    [facility] admit for Crohn's     Acute ulcerative colitis (H)      Allergic rhinitis, cause unspecified     Allergic rhinitis     Arthritis       C. difficile diarrhea     Past, no current diarrhea.     Crohn disease (H)      Crohn's disease (H)     sees Dr Summers or Ryan at [facility] in [place]     Crohn's disease (H)      Depression with anxiety 2003    Dr Bernard (psychiatry) at [facility],      Esophageal reflux     GERD     Grand mal seizure disorder (H) 10/8/2013     Hypertension      Intestinal infection due to Clostridium difficile 10/00    C diff culture and toxin positive, treated with Flagyl     Localization-related (focal) (partial) epilepsy and epileptic syndromes with simple partial seizures, without mention of intractable epilepsy     pseudoseizures diagnosed after extensive neurologic eval     Migraine 07/21/12    D/C 07/22/12-[facility]     Migraine, unspecified, without mention of intractable migraine without mention of status migrainosus     Migraine     Mild intermittent asthma     mild intermittent     Mycoplasma infection in conditions classified elsewhere and of unspecified site      Other chronic pain     Back pain for 6 years     Renal disease     Kidney stones     Unspecified hearing loss     congenital hearing loss     Past Surgical History:   Procedure Laterality Date     AS REMOVAL OF COCCYX  10/18/2017     COLONOSCOPY  7/1/2009    [facility], [place].     COLONOSCOPY N/A 7/17/2019    Procedure: Colonoscopy, With Polypectomy And Biopsy;  Surgeon: Edwin Conde MD;  Location: [facility] OR     COLONOSCOPY N/A 1/11/2022    Procedure: COLONOSCOPY, WITH POLYPECTOMY AND BIOPSY;  Surgeon: Bob Ugarte DO;  Location:  GI     COLONOSCOPY WITH CO2 INSUFFLATION N/A 7/17/2019    Procedure: COLONOSCOPY, WITH CO2 INSUFFLATION;  Surgeon: Edwin Conde MD;  Location: [facility] OR     COMBINED ESOPHAGOSCOPY, GASTROSCOPY, DUODENOSCOPY (EGD) WITH CO2 INSUFFLATION N/A 4/12/2019    Procedure: COMBINED ESOPHAGOSCOPY, GASTROSCOPY, DUODENOSCOPY (EGD) WITH CO2 INSUFFLATION;  Surgeon: Edwin Conde  MD Maxi;  Location: [facility] OR     ESOPHAGOSCOPY, GASTROSCOPY, DUODENOSCOPY (EGD), COMBINED N/A 4/12/2019    Procedure: Combined Esophagoscopy, Gastroscopy, Duodenoscopy (Egd), Biopsy Single Or Multiple;  Surgeon: Edwin Conde MD;  Location: [facility] OR     FISTULOTOMY RECTUM N/A 4/30/2020    Procedure: EXAM UNDER ANESTHESIA, ANUS, parital fistulotomy;  Surgeon: Valentin Sanchez MD;  Location: [facility] OR     FUSION SPINE POSTERIOR MINIMALLY INVASIVE ONE LEVEL N/A 2/23/2017    Procedure: FUSION SPINE POSTERIOR MINIMALLY INVASIVE ONE LEVEL;  L4-5 Oblique Lateral Lumbar Interbody Fusion   Epidural steroid injection.   Transpedicular Bone marrow aspiration;  Surgeon: Jeniffer Eugene MD;  Location: [facility] OR     HC EEG AWAKE AND SLEEP      abnormal     HC MRI BRAIN W/O CONTRAST  12/00    normal     HC REMOVAL GALLBLADDER  8/5/2009    [facility].     HC UGI ENDOSCOPY DIAG W BIOPSY  11/11/09    Normal esophagus     ORTHOPEDIC SURGERY  October 19,2011    diskectomy L4-L5     ZZC FUSION OF SACROILIAC JOINT  10/26/2018     ZZ COLONOSCOPY THRU STOMA WITH BIOPSY  10/29/2003    Impression is that of normal appearing colonoscopy, without evidence of rectal bleeding.     ZZ COLONOSCOPY THRU STOMA, DIAGNOSTIC  10/00    normal     [facility] COLONOSCOPY THRU STOMA, DIAGNOSTIC  Oct 2009    Dr López- normal     [facility] UGI ENDOSCOPY, SIMPLE EXAM  7/00, 10/00    mild chronic esophagitis and duodenitis, neg H pylori     [facility] UGI ENDOSCOPY, SIMPLE EXAM  01/20/2005    Esophagogastroduodenoscopy, colonoscopy with biopsies.  [facility] UGI ENDOSCOPY, SIMPLE EXAM  7/1/2009    [facility], [facility].     [facility] UGI ENDOSCOPY, SIMPLE EXAM  11/11/2009    attempted upper GI, pt. could not tolerate procedure:[facility]     acetaminophen-codeine (TYLENOL #3) 300-30 MG tablet  albuterol (PROAIR HFA/PROVENTIL HFA/VENTOLIN HFA) 108 (90 Base) MCG/ACT inhaler  ARIPiprazole (ABILIFY) 30 MG  tablet  azelastine (OPTIVAR) 0.05 % ophthalmic solution  bisacodyl (DULCOLAX) 5 MG EC tablet  budesonide (ENTOCORT EC) 3 MG EC capsule  buPROPion (WELLBUTRIN XL) 300 MG 24 hr tablet  busPIRone (BUSPAR) 15 MG tablet  clonazePAM (KLONOPIN) 1 MG tablet  cyclobenzaprine (FLEXERIL) 10 MG tablet  dicyclomine (BENTYL) 20 MG tablet  DULoxetine (CYMBALTA) 30 MG capsule  ELIQUIS ANTICOAGULANT 2.5 MG tablet  EPINEPHrine (ANY BX GENERIC EQUIV) 0.3 MG/0.3ML injection 2-pack  Ferrous Sulfate (IRON PO)  furosemide (LASIX) 20 MG tablet  gabapentin (NEURONTIN) 300 MG capsule  HYDROcodone-acetaminophen (NORCO) 5-325 MG tablet  HYDROmorphone (DILAUDID) 2 MG tablet  lidocaine (XYLOCAINE) 2 % solution  mesalamine (CANASA) 1000 MG suppository  mesalamine ER (PENTASA) 500 MG CR capsule  metoprolol succinate ER (TOPROL-XL) 50 MG 24 hr tablet  metroNIDAZOLE (METROGEL-VAGINAL) 0.75 % vaginal gel  NIFEdipine ER OSMOTIC (PROCARDIA XL) 60 MG 24 hr tablet  ondansetron (ZOFRAN ODT) 4 MG ODT tab  ondansetron (ZOFRAN) 4 MG tablet  oxyCODONE (OXY-IR) 5 MG capsule  pantoprazole (PROTONIX) 40 MG EC tablet  polyethylene glycol (MIRALAX) 17 g packet  polyethylene glycol (MIRALAX) 17 GM/Dose powder  rizatriptan (MAXALT-MLT) 5 MG ODT  sucralfate (CARAFATE) 1 GM tablet      Allergies   Allergen Reactions     Iohexol Hives     Other reaction(s): Hives  IV contrast; patient states she tolerates contrast if she gets benadryl before  IV contrast; patient states she tolerates contrast if she gets benadryl before     Other Environmental Allergy Rash     Plastic tape     Baclofen      hives     Bees Hives, Swelling and Difficulty breathing     Contrast Dye      Hives,   Updated 5/10/2016 CT Contrast.     Iodine Hives     Metoclopramide      Other reaction(s): Tremors  LW Reaction: shaking/sweating     Midazolam      Other reaction(s): Agitation     Monosodium Glutamate      Morphine Other (See Comments)     Difficulty with urination     Nsaids Other (See  Comments)     GI BLEED x2     Other (Do Not Use) Other (See Comments)     Xanaflex- pt becomes disoriented and loses bladder control     Reglan [Metoclopramide Hcl] Other (See Comments)     shaking     Soma [Carisoprodol] Visual Disturbance     Sleep walking     Tizanidine      Topamax Other (See Comments)     Topamax [Topiramate] Nausea     Tingling  GI/Vomit     Tramadol      Severe Headache, Seizure Risk     Tylenol W/Codeine [Acetaminophen-Codeine] Nausea and Itching     Tylenol 3     Versed Other (See Comments)     Zolmitriptan      Makes face feel like its twitching     Droperidol Anxiety     Flu Virus Vaccine Rash      Arm swelling     Family History  Family History   Problem Relation Age of Onset     Gastrointestinal Disease Brother         severe Crohn's     Neurologic Disorder Brother         Seizures post head injury     Depression Brother      Substance Abuse Brother      Genitourinary Problems Father         kidney stones     Diabetes Father      Heart Disease Father         Open heart surgery     Breast Cancer Maternal Grandmother      Parkinsonism Maternal Grandmother      Cerebrovascular Disease Paternal Grandmother      Cancer Maternal Grandfather         Lung     Cardiovascular Paternal Grandfather         Heart Attack     Substance Abuse Mother         in recovery x1 year      Lung Cancer Paternal Uncle      Cancer Paternal Uncle      Lung Cancer Maternal Uncle      Colon Cancer Maternal Uncle      Social History   Social History     Tobacco Use     Smoking status: Former Smoker     Packs/day: 0.25     Years: 5.00     Pack years: 1.25     Types: Cigarettes     Smokeless tobacco: Never Used   Vaping Use     Vaping Use: Former     Substances: CBD     Devices: Disposable, Pre-filled or refillable cartridge   Substance Use Topics     Alcohol use: Not Currently     Comment: Not since last July 2018     Drug use: Not Currently     Types: Marijuana     Comment: Medical Marijuana currently-- More CBD       Past medical history, past surgical history, medications, allergies, family history, and social history were reviewed with the patient. No additional pertinent items.     I have reviewed the Medications, Allergies, Past Medical and Surgical History, and Social History in the Epic system.    Review of Systems   Gastrointestinal: Positive for abdominal pain, diarrhea, nausea and vomiting.   Genitourinary: Positive for vaginal discharge. Negative for dysuria.     A complete review of systems was performed with pertinent positives and negatives noted in the HPI, and all other systems negative.    Physical Exam   BP: 125/89  Pulse: 116  Temp: 97.5  F (36.4  C)  Resp: 16  SpO2: 97 %      Physical Exam  Vitals and nursing note reviewed.   Constitutional:       General: She is not in acute distress.     Appearance: She is not diaphoretic.   HENT:      Head: Atraumatic.      Mouth/Throat:      Pharynx: No oropharyngeal exudate.   Eyes:      General: No scleral icterus.     Pupils: Pupils are equal, round, and reactive to light.   Cardiovascular:      Heart sounds: Normal heart sounds.   Pulmonary:      Effort: No respiratory distress.      Breath sounds: Normal breath sounds.   Abdominal:      General: Bowel sounds are normal.      Palpations: Abdomen is soft.      Tenderness: There is abdominal tenderness in the epigastric area and left upper quadrant. There is no guarding or rebound.   Musculoskeletal:         General: No tenderness.   Skin:     General: Skin is warm.      Findings: No rash.         ED Course   At 11:45 AM the patient was seen and examined by Victorino Barry MD in Room ED04.        Procedures            The medical record was reviewed and interpreted.  Current labs reviewed and interpreted.  Previous labs reviewed and interpreted.  Current images reviewed and interpreted: CT abdomen pelvis.  Previous images reviewed and interpreted: CT chest dated 1/17/2022.  Managed outpatient prescription  medications.   utilized.         Results for orders placed or performed during the hospital encounter of 01/19/22 (from the past 24 hour(s))   hCG qual urine POCT   Result Value Ref Range    HCG Qual Urine Negative Negative    Internal QC Check POCT Valid Valid    POCT Kit Lot Number [id number]     POCT Kit Expiration Date 03/31/2023    CBC with platelets differential    Narrative    The following orders were created for panel order CBC with platelets differential.  Procedure                               Abnormality         Status                     ---------                               -----------         ------                     CBC with platelets and d...[[id number]]                      Final result                 Please view results for these tests on the individual orders.   Comprehensive metabolic panel   Result Value Ref Range    Sodium 138 133 - 144 mmol/L    Potassium 4.8 3.4 - 5.3 mmol/L    Chloride 107 94 - 109 mmol/L    Carbon Dioxide (CO2) 28 20 - 32 mmol/L    Anion Gap 3 3 - 14 mmol/L    Urea Nitrogen 16 7 - 30 mg/dL    Creatinine 0.73 0.52 - 1.04 mg/dL    Calcium 9.2 8.5 - 10.1 mg/dL    Glucose 91 70 - 99 mg/dL    Alkaline Phosphatase 91 40 - 150 U/L    AST 7 0 - 45 U/L    ALT 26 0 - 50 U/L    Protein Total 7.0 6.8 - 8.8 g/dL    Albumin 3.4 3.4 - 5.0 g/dL    Bilirubin Total 0.2 0.2 - 1.3 mg/dL    GFR Estimate >90 >60 mL/min/1.73m2   Lipase   Result Value Ref Range    Lipase 124 73 - 393 U/L   CBC with platelets and differential   Result Value Ref Range    WBC Count 5.2 4.0 - 11.0 10e3/uL    RBC Count 4.09 3.80 - 5.20 10e6/uL    Hemoglobin 13.4 11.7 - 15.7 g/dL    Hematocrit 38.6 35.0 - 47.0 %    MCV 94 78 - 100 fL    MCH 32.8 26.5 - 33.0 pg    MCHC 34.7 31.5 - 36.5 g/dL    RDW 12.9 10.0 - 15.0 %    Platelet Count 275 150 - 450 10e3/uL    % Neutrophils 59 %    % Lymphocytes 29 %    % Monocytes 10 %    % Eosinophils 2 %    % Basophils 0 %    % Immature Granulocytes 0 %    NRBCs per 100 WBC  0 <1 /100    Absolute Neutrophils 3.1 1.6 - 8.3 10e3/uL    Absolute Lymphocytes 1.5 0.8 - 5.3 10e3/uL    Absolute Monocytes 0.5 0.0 - 1.3 10e3/uL    Absolute Eosinophils 0.1 0.0 - 0.7 10e3/uL    Absolute Basophils 0.0 0.0 - 0.2 10e3/uL    Absolute Immature Granulocytes 0.0 <=0.4 10e3/uL    Absolute NRBCs 0.0 10e3/uL   Abd/pelvis CT no contrast - [facility] Protocol    Narrative    CT ABDOMEN PELVIS WITHOUT CONTRAST 1/19/2022 1:39 PM    CLINICAL HISTORY: Abdominal pain. Nausea and vomiting.  TECHNIQUE: CT scan of the abdomen and pelvis was performed without IV  contrast. Multiplanar reformats were obtained. Dose reduction  techniques were used.  CONTRAST: None.  COMPARISON: CT of the abdomen and pelvis performed 9/5/2021.    FINDINGS:   LOWER CHEST: Consolidation at the left lung base posteriorly has  improved since the previous exam. The visualized lung bases are  otherwise clear.    HEPATOBILIARY: Cholecystectomy. No hepatic masses are seen.    PANCREAS: Normal.    SPLEEN: Normal.    ADRENAL GLANDS: Normal.    KIDNEYS/BLADDER: A few small nonobstructing stones in the right kidney  measures 0.2 cm or smaller. A small hemorrhagic cyst in the lower pole  of the right kidney measures 1.2 cm. The kidneys are otherwise  unremarkable. No ureteral calculi or hydronephrosis.    BOWEL: No bowel obstruction. No convincing evidence for colitis or  diverticulitis. Unremarkable appendix.    PELVIC ORGANS: Unremarkable.    LYMPH NODES: No enlarged lymph nodes are identified in the abdomen or  pelvis.    VASCULATURE: Unremarkable.    ADDITIONAL FINDINGS: None.    MUSCULOSKELETAL: Postoperative changes of posterior marilyn and pedicle  screw fusion at L4-5. Two surgical screws are also noted to cross the  left sacroiliac joint. Spinal stimulator device is noted in the  subcutaneous fat of the right flank region, with lead tips at the  level of T7-8.      Impression    IMPRESSION:   1.  No acute abnormality in the abdomen or pelvis. No cause  for  abdominal pain is identified.  2.  Consolidation at the left lung base posteriorly has improved since  the previous exam. A persistent or recurrent pneumonia cannot be  excluded. Please clinically correlate.  3.  Few small nonobstructing stones in the right kidney measure 0.2 cm  or smaller.       *Note: Due to a large number of results and/or encounters for the requested time period, some results have not been displayed. A complete set of results can be found in Results Review.     Medications   ondansetron (ZOFRAN-ODT) ODT tab 8 mg (8 mg Oral Given 1/19/22 1154)   oxyCODONE (ROXICODONE) tablet 10 mg (10 mg Oral Given 1/19/22 1155)             Assessments & Plan (with Medical Decision Making)   31-year-old female with a history of inflammatory bowel disease, and chronic pain presenting complaining of epigastric and left upper quadrant abdominal pain and vaginal discharge.  Also reporting some blood in stools.  Initial differential diagnosis included but was not restricted to complication of recent colonoscopy including perforation, exacerbation of inflammatory bowel disease, pancreatitis, gastritis, peptic ulcer disease, pyelonephritis, ureterolithiasis,cholecystitis or cholelithiasis, cholangitis,duodenitis, small bowel obstruction, less likely intestinal ischemia, atypical cardiac presentation, referred pelvic pain from ovarian torsion/cyst/cyst rupture/TOA, AAA,  as well as numerous other life-threatening and the life-threatening causes of left upper quadrant pain.  On exam patient has normal vitals.  She does not appear specifically uncomfortable.  There is epigastric tenderness without peritoneal signs.  The remainder of her physical exam is unremarkable.  She declined a pelvic exam as she prefers to have this done by a female physician.  Labs and imaging are negative.  Treated with oral meds and was substantially improved.  This is the patient's third ED visit in a week, her last 2 visits more for chest  pain concerns which she states has resolved today and for which she had mostly negative work-up.  one previous imaging study did show some consolidation in the lung which on today's imaging is improved and she is not endorsing respiratory symptoms.  Patient's abdominal pain appears more related to her chronic GI issues as there is  On imaging of acute processes in the abdomen, labs unremarkable, no pelvic tenderness.  She did endorse a vaginal discharge but declined a pelvic exam and is adamant that she is not sexually active.  She has a planned follow-up with her gynecologist next week at her home [facility].  We agreed ultimately to trial of MetroGel vaginal as she has experienced similar discharge with bacterial vaginosis in the past.  With adequate follow-up in place I feel this is reasonable.  Based on the clinical findings and the entire clinical scenario, the patient appears stable at this time to be treated symptomatically, and to follow-up as an outpatient for any further evaluation and treatment.  Discussed expected course, need for follow up, and indications for return with the patient.  See discharge instructions.    I have reviewed the nursing notes.    I have reviewed the findings, diagnosis, plan and need for follow up with the patient.    New Prescriptions    METRONIDAZOLE (METROGEL-VAGINAL) 0.75 % VAGINAL GEL    5 g daily for five days    ONDANSETRON (ZOFRAN ODT) 4 MG ODT TAB    Take 2 tablets (8 mg) by mouth every 8 hours as needed for nausea or vomiting    OXYCODONE (OXY-IR) 5 MG CAPSULE    Take 1 capsule (5 mg) by mouth every 4 hours as needed for severe pain       Final diagnoses:   Abdominal pain, epigastric   Vaginitis and vulvovaginitis     IErum am serving as a trained medical scribe to document services personally performed by Victorino Barry MD based on the provider's statements to me on January 19, 2022.  This document has been checked and approved by the attending  provider.    I, Victorino Barry MD, was physically present and have reviewed and verified the accuracy of this note documented by Erum Ricketts, medical scribe.      Victorino Barry MD  1/19/2022   [facility] EMERGENCY DEPARTMENT     Victorino Barry MD  01/19/22 [id number]

## 2022-01-19 NOTE — ED TRIAGE NOTES
Stomach pain.  Colonoscopy last week showed ulcers, inflammation in colon.    Vaginal discharge and discomfort.

## 2022-01-19 NOTE — DISCHARGE INSTRUCTIONS
Thank you for choosing [facility].     Please closely monitor for further symptoms. Return to the Emergency Department if you develop any new or worsening signs or symptoms.    If you received any opiate pain medications or sedatives during your visit, please do not drive for at least 8 hours.     Labs, cultures or final xray interpretations may still need to be reviewed.  We will call you if your plan of care needs to be changed.    Please follow up with your primary care physician or clinic.

## 2022-01-20 ENCOUNTER — APPOINTMENT (OUTPATIENT)
Dept: GENERAL RADIOLOGY | Facility: CLINIC | Age: 32
End: 2022-01-20
Attending: EMERGENCY MEDICINE
Payer: MEDICARE

## 2022-01-20 ENCOUNTER — HOSPITAL ENCOUNTER (EMERGENCY)
Facility: CLINIC | Age: 32
Discharge: HOME | End: 2022-01-20
Attending: EMERGENCY MEDICINE | Admitting: EMERGENCY MEDICINE
Payer: MEDICARE

## 2022-01-20 VITALS
BODY MASS INDEX: 34.96 KG/M2 | WEIGHT: 190 LBS | HEART RATE: 96 BPM | HEIGHT: 62 IN | SYSTOLIC BLOOD PRESSURE: 131 MMHG | DIASTOLIC BLOOD PRESSURE: 89 MMHG | RESPIRATION RATE: 20 BRPM | TEMPERATURE: 98.6 F | OXYGEN SATURATION: 95 %

## 2022-01-20 DIAGNOSIS — Z11.52 ENCOUNTER FOR SCREENING LABORATORY TESTING FOR SEVERE ACUTE RESPIRATORY SYNDROME CORONAVIRUS 2 (SARS-COV-2): ICD-10-CM

## 2022-01-20 DIAGNOSIS — F44.9 CONVERSION DISORDER: ICD-10-CM

## 2022-01-20 DIAGNOSIS — R07.89 OTHER CHEST PAIN: ICD-10-CM

## 2022-01-20 DIAGNOSIS — F44.6: ICD-10-CM

## 2022-01-20 LAB
FLUAV RNA SPEC QL NAA+PROBE: NEGATIVE
FLUBV RNA RESP QL NAA+PROBE: NEGATIVE
RSV RNA SPEC NAA+PROBE: NEGATIVE
SARS-COV-2 RNA RESP QL NAA+PROBE: NEGATIVE

## 2022-01-20 PROCEDURE — 87637 SARSCOV2&INF A&B&RSV AMP PRB: CPT | Performed by: EMERGENCY MEDICINE

## 2022-01-20 PROCEDURE — C9803 HOPD COVID-19 SPEC COLLECT: HCPCS

## 2022-01-20 PROCEDURE — 71045 X-RAY EXAM CHEST 1 VIEW: CPT

## 2022-01-20 PROCEDURE — 99284 EMERGENCY DEPT VISIT MOD MDM: CPT | Mod: 25

## 2022-01-20 PROCEDURE — 250N000013 HC RX MED GY IP 250 OP 250 PS 637: Performed by: EMERGENCY MEDICINE

## 2022-01-20 PROCEDURE — 99284 EMERGENCY DEPT VISIT MOD MDM: CPT | Performed by: EMERGENCY MEDICINE

## 2022-01-20 PROCEDURE — 71045 X-RAY EXAM CHEST 1 VIEW: CPT | Mod: 26 | Performed by: RADIOLOGY

## 2022-01-20 RX ORDER — OXYCODONE HYDROCHLORIDE 5 MG/1
5 TABLET ORAL ONCE
Status: COMPLETED | OUTPATIENT
Start: 2022-01-20 | End: 2022-01-20

## 2022-01-20 RX ADMIN — OXYCODONE HYDROCHLORIDE 5 MG: 5 TABLET ORAL at 21:52

## 2022-01-20 ASSESSMENT — MIFFLIN-ST. JEOR: SCORE: 1525.08

## 2022-01-20 ASSESSMENT — ENCOUNTER SYMPTOMS: NUMBNESS: 1

## 2022-01-21 ASSESSMENT — ENCOUNTER SYMPTOMS
RESPIRATORY NEGATIVE: 1
FATIGUE: 1
WEAKNESS: 1
MUSCULOSKELETAL NEGATIVE: 1
GASTROINTESTINAL NEGATIVE: 1

## 2022-01-21 NOTE — ED TRIAGE NOTES
Pt BIBA from home. Deaf.  used to call 911. Dizziness, weakness and numbness in both legs - symptoms started this evening. Hx blood clots (takes eliquis) and htn. Vitals stable with EMS.

## 2022-01-21 NOTE — RESULT ENCOUNTER NOTE
Negative for Influenza A, Influenza B, RSV and Covid19.  Patient will receive the Covid19 result via [employer] and a letter will be sent via [employer] (if active) or via the mail

## 2022-01-21 NOTE — ED PROVIDER NOTES
"      [place] EMERGENCY DEPARTMENT ([facility])  1/20/22      History     Chief Complaint   Patient presents with     Numbness     HPI  Katy Islas is a 31 year old female with complex past history including hearing loss, Crohn's disease, depression, anxiety, chronic pain, bipolar, mild asthma, chronic back pain, post lumbar fusion, multiple orthopedic pain complaints, and borderline personality.  She is here reporting that she \"does not feel good\". She has difficulty putting a finer point on this. She says that both of her legs are numb.  Review of the chart shows that she was seen twice in the emergency department in the last 3 days, this would be her third visit. On the other visits she had a CT of her abdomen and and also had a CT of her chest. Both times she was discharged home and there were no abnormal findings. Her laboratory work-up was also normal.  She says she feels some crackles in her chest and she reports that she has had pneumonia in the past.  She is COVID vaccinated and has had COVID in the past.    This part of the medical record was transcribed by Katy Helms, Medical Scribe, from a dictation done by Kirk Rojas MD.       Past Medical History  Past Medical History:   Diagnosis Date     Abdominal pain 10/31- 11/4/2005    [facility] admit for Crohn's     Acute ulcerative colitis (H)      Allergic rhinitis, cause unspecified     Allergic rhinitis     Arthritis      C. difficile diarrhea     Past, no current diarrhea.     Crohn disease (H)      Crohn's disease (H)     sees Dr Summers or Ryan at [facility] in [place]     Crohn's disease (H)      Depression with anxiety 2003    Dr Bernard (psychiatry) at 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 neurologic eval     Migraine 07/21/12    D/C 07/22/12-[facility]     Migraine, unspecified, without mention of intractable migraine without mention of status migrainosus     Migraine     Mild intermittent asthma     mild intermittent     Mycoplasma infection in conditions classified elsewhere and of unspecified site      Other chronic pain     Back pain for 6 years     Renal disease     Kidney stones     Unspecified hearing loss     congenital hearing loss     Past Surgical History:   Procedure Laterality Date     AS REMOVAL OF COCCYX  10/18/2017     COLONOSCOPY  7/1/2009    [facility] [place]     COLONOSCOPY N/A 7/17/2019    Procedure: Colonoscopy, With Polypectomy And Biopsy;  Surgeon: Edwin Conde MD;  Location: [facility]     COLONOSCOPY N/A 1/11/2022    Procedure: COLONOSCOPY, WITH POLYPECTOMY AND BIOPSY;  Surgeon: Bob Ugarte DO;  Location: [facility]     COLONOSCOPY WITH CO2 INSUFFLATION N/A 7/17/2019    Procedure: COLONOSCOPY, WITH CO2 INSUFFLATION;  Surgeon: Edwin Conde MD;  Location: [facility] OR     COMBINED ESOPHAGOSCOPY, GASTROSCOPY, DUODENOSCOPY (EGD) WITH CO2 INSUFFLATION N/A 4/12/2019    Procedure: COMBINED ESOPHAGOSCOPY, GASTROSCOPY, DUODENOSCOPY (EGD) WITH CO2 INSUFFLATION;  Surgeon: Edwin Conde MD;  Location: [facility] OR     ESOPHAGOSCOPY, GASTROSCOPY, DUODENOSCOPY (EGD), COMBINED N/A 4/12/2019    Procedure: Combined Esophagoscopy, Gastroscopy, Duodenoscopy (Egd), Biopsy Single Or Multiple;  Surgeon: Edwin Conde MD;  Location: [facility] OR     FISTULOTOMY RECTUM N/A 4/30/2020    Procedure: EXAM UNDER ANESTHESIA, ANUS, parital fistulotomy;  Surgeon: Valentin Sanchez MD;  Location: [facility]     FUSION SPINE POSTERIOR MINIMALLY INVASIVE ONE LEVEL N/A 2/23/2017    Procedure: FUSION SPINE  POSTERIOR MINIMALLY INVASIVE ONE LEVEL;  L4-5 Oblique Lateral Lumbar Interbody Fusion   Epidural steroid injection.   Transpedicular Bone marrow aspiration;  Surgeon: Jeniffer Eugene MD;  Location: [facility] OR     HC EEG AWAKE AND SLEEP      abnormal     HC MRI BRAIN W/O CONTRAST  12/00    normal     HC REMOVAL GALLBLADDER  8/5/2009    [facility].      [facility] ENDOSCOPY DIAG W BIOPSY  11/11/09    Normal esophagus     ORTHOPEDIC SURGERY  October 19,2011    diskectomy L4-L5     ZZC FUSION OF SACROILIAC JOINT  10/26/2018     ZZ COLONOSCOPY THRU STOMA WITH BIOPSY  10/29/2003    Impression is that of normal appearing colonoscopy, without evidence of rectal bleeding.     [facility] COLONOSCOPY THRU STOMA, DIAGNOSTIC  10/00    normal     ZZ COLONOSCOPY THRU STOMA, DIAGNOSTIC  Oct 2009    Dr López- normal     [facility] UGI ENDOSCOPY, SIMPLE EXAM  7/00, 10/00    mild chronic esophagitis and duodenitis, neg H pylori     [facility] UGI ENDOSCOPY, SIMPLE EXAM  01/20/2005    Esophagogastroduodenoscopy, colonoscopy with biopsies.  [facility] UGI ENDOSCOPY, SIMPLE EXAM  7/1/2009    [facility], [facility].     [facility] UGI ENDOSCOPY, SIMPLE EXAM  11/11/2009    attempted upper GI, pt. could not tolerate procedure:[facility]     acetaminophen-codeine (TYLENOL #3) 300-30 MG tablet  albuterol (PROAIR HFA/PROVENTIL HFA/VENTOLIN HFA) 108 (90 Base) MCG/ACT inhaler  ARIPiprazole (ABILIFY) 30 MG tablet  azelastine (OPTIVAR) 0.05 % ophthalmic solution  bisacodyl (DULCOLAX) 5 MG EC tablet  budesonide (ENTOCORT EC) 3 MG EC capsule  buPROPion (WELLBUTRIN XL) 300 MG 24 hr tablet  busPIRone (BUSPAR) 15 MG tablet  clonazePAM (KLONOPIN) 1 MG tablet  cyclobenzaprine (FLEXERIL) 10 MG tablet  dicyclomine (BENTYL) 20 MG tablet  DULoxetine (CYMBALTA) 30 MG capsule  ELIQUIS ANTICOAGULANT 2.5 MG tablet  EPINEPHrine (ANY BX GENERIC EQUIV) 0.3 MG/0.3ML injection 2-pack  Ferrous Sulfate (IRON PO)  furosemide (LASIX)  20 MG tablet  gabapentin (NEURONTIN) 300 MG capsule  HYDROcodone-acetaminophen (NORCO) 5-325 MG tablet  HYDROmorphone (DILAUDID) 2 MG tablet  lidocaine (XYLOCAINE) 2 % solution  mesalamine (CANASA) 1000 MG suppository  mesalamine ER (PENTASA) 500 MG CR capsule  metoprolol succinate ER (TOPROL-XL) 50 MG 24 hr tablet  metroNIDAZOLE (METROGEL-VAGINAL) 0.75 % vaginal gel  NIFEdipine ER OSMOTIC (PROCARDIA XL) 60 MG 24 hr tablet  ondansetron (ZOFRAN ODT) 4 MG ODT tab  ondansetron (ZOFRAN) 4 MG tablet  oxyCODONE (OXY-IR) 5 MG capsule  pantoprazole (PROTONIX) 40 MG EC tablet  polyethylene glycol (MIRALAX) 17 g packet  polyethylene glycol (MIRALAX) 17 GM/Dose powder  rizatriptan (MAXALT-MLT) 5 MG ODT  sucralfate (CARAFATE) 1 GM tablet      Allergies   Allergen Reactions     Iohexol Hives     Other reaction(s): Hives  IV contrast; patient states she tolerates contrast if she gets benadryl before  IV contrast; patient states she tolerates contrast if she gets benadryl before     Other Environmental Allergy Rash     Plastic tape     Baclofen      hives     Bees Hives, Swelling and Difficulty breathing     Contrast Dye      Hives,   Updated 5/10/2016 CT Contrast.     Iodine Hives     Metoclopramide      Other reaction(s): Tremors  LW Reaction: shaking/sweating     Midazolam      Other reaction(s): Agitation     Monosodium Glutamate      Morphine Other (See Comments)     Difficulty with urination     Nsaids Other (See Comments)     GI BLEED x2     Other (Do Not Use) Other (See Comments)     Xanaflex- pt becomes disoriented and loses bladder control     Reglan [Metoclopramide Hcl] Other (See Comments)     shaking     Soma [Carisoprodol] Visual Disturbance     Sleep walking     Tizanidine      Topamax Other (See Comments)     Topamax [Topiramate] Nausea     Tingling  GI/Vomit     Tramadol      Severe Headache, Seizure Risk     Tylenol W/Codeine [Acetaminophen-Codeine] Nausea and Itching     Tylenol 3     Versed Other (See Comments)      Zolmitriptan      Makes face feel like its twitching     Droperidol Anxiety     Flu Virus Vaccine Rash      Arm swelling     Family History  Family History   Problem Relation Age of Onset     Gastrointestinal Disease Brother         severe Crohn's     Neurologic Disorder Brother         Seizures post head injury     Depression Brother      Substance Abuse Brother      Genitourinary Problems Father         kidney stones     Diabetes Father      Heart Disease Father         Open heart surgery     Breast Cancer Maternal Grandmother      Parkinsonism Maternal Grandmother      Cerebrovascular Disease Paternal Grandmother      Cancer Maternal Grandfather         Lung     Cardiovascular Paternal Grandfather         Heart Attack     Substance Abuse Mother         in recovery x1 year      Lung Cancer Paternal Uncle      Cancer Paternal Uncle      Lung Cancer Maternal Uncle      Colon Cancer Maternal Uncle      Social History   Social History     Tobacco Use     Smoking status: Former Smoker     Packs/day: 0.25     Years: 5.00     Pack years: 1.25     Types: Cigarettes     Smokeless tobacco: Never Used   Vaping Use     Vaping Use: Former     Substances: CBD     Devices: Disposable, Pre-filled or refillable cartridge   Substance Use Topics     Alcohol use: Not Currently     Comment: Not since last July 2018     Drug use: Not Currently     Types: Marijuana     Comment: Medical Marijuana currently-- More CBD      Past medical history, past surgical history, medications, allergies, family history, and social history were reviewed with the patient. No additional pertinent items.       Review of Systems   Constitutional: Positive for fatigue.         used   HENT: Negative.    Respiratory: Negative.    Cardiovascular: Positive for chest pain.   Gastrointestinal: Negative.    Genitourinary: Negative.    Musculoskeletal: Negative.    Neurological: Positive for weakness and numbness.   All other systems  "reviewed and are negative.    A complete review of systems was performed with pertinent positives and negatives noted in the HPI, and all other systems negative.    Physical Exam   BP: (!) 139/91  Pulse: 108  Temp: 98.3  F (36.8  C)  Resp: 18  Height: 157.5 cm (5' 2\")  Weight: 86.2 kg (190 lb)  SpO2: 97 %  Physical Exam  Vitals and nursing note reviewed.   Constitutional:       General: She is not in acute distress.     Appearance: She is well-developed.   HENT:      Head: Normocephalic and atraumatic.      Mouth/Throat:      Mouth: Mucous membranes are moist.   Eyes:      General: No scleral icterus.     Conjunctiva/sclera: Conjunctivae normal.      Pupils: Pupils are equal, round, and reactive to light.   Cardiovascular:      Rate and Rhythm: Normal rate and regular rhythm.      Heart sounds: Normal heart sounds.   Pulmonary:      Effort: Pulmonary effort is normal. No respiratory distress.      Breath sounds: Normal breath sounds. No wheezing.   Abdominal:      General: Abdomen is flat.      Palpations: Abdomen is soft.   Musculoskeletal:      Cervical back: Neck supple.   Skin:     General: Skin is warm and dry.   Neurological:      General: No focal deficit present.      Mental Status: She is alert and oriented to person, place, and time.      Cranial Nerves: No cranial nerve deficit.      Sensory: No sensory deficit.      Motor: Weakness present.      Deep Tendon Reflexes:      Reflex Scores:       Patellar reflexes are 3+ on the right side and 3+ on the left side.       Achilles reflexes are 3+ on the right side and 3+ on the left side.     Comments: Patient reports sensory alteration and inability to move either of her legs.  She has normal reflexes.  She is able to move her legs.  There is no reason why she would have bilateral paralysis review of the chart shows that she has conversion disorder.  And this is more than likely the etiology of this complaint.  She did not have this complaint when she was seen " in 2 previous ED visits in the last 3 days at which time she had a CT of her chest abdomen and pelvis.   Psychiatric:         Mood and Affect: Mood normal.         Behavior: Behavior normal.         ED Course      Procedures                     Results for orders placed or performed during the hospital encounter of 01/20/22   XR Chest Port 1 View     Status: None    Narrative    EXAM: XR CHEST PORT 1 VIEW  1/20/2022 9:28 PM      HISTORY: cough    COMPARISON: CT dated 1/17/2022. Chest x-ray dated 1/15/2022.    FINDINGS: Single view of the chest. Single AP semiupright radiograph  of the chest. Thoracic spinal stimulator. Midline trachea when  accounting for rotation on this image. Cardiomediastinal silhouette is  within normal limits. Pulmonary vasculature is distinct. Streaky left  basilar opacity similar to prior radiograph and CT. Elevated left  hemidiaphragm. No new focal consolidative opacity. No large pleural  effusion. No appreciable pneumothorax. There is some gaseous  distention of the stomach and splenic flexure beneath the left  hemidiaphragm noted.      Impression    IMPRESSION:  1. Streaky left basilar opacity similar to prior most likely  representing atelectasis.  2. Stable elevation of the left hemidiaphragm.    I have personally reviewed the examination and initial interpretation  and I agree with the findings.    YASMEEN MARCANO MD         SYSTEM ID:  [id number]   Symptomatic; Unknown Influenza A/B & SARS-CoV2 (COVID-19) Virus PCR Multiplex Nasopharyngeal     Status: Normal    Specimen: Nasopharyngeal; Swab   Result Value Ref Range    Influenza A PCR Negative Negative    Influenza B PCR Negative Negative    RSV PCR Negative Negative    SARS CoV2 PCR Negative Negative, Testing sent to reference lab. Results will be returned via unsolicited result    Narrative    Testing was performed using the Xpert Xpress CoV2/Flu/RSV Assay on the Cepheid GeneXpert Instrument. This test should be ordered for the  detection of SARS-CoV-2 and influenza viruses in individuals who meet clinical and/or epidemiological criteria. Test performance is unknown in asymptomatic patients. This test is for in vitro diagnostic use under the FDA EUA for laboratories certified under CLIA to perform high or moderate complexity testing. This test has not been FDA cleared or approved. A negative result does not rule out the presence of PCR inhibitors in the specimen or target RNA in concentration below the limit of detection for the assay. If only one viral target is positive but coinfection with multiple targets is suspected, the sample should be re-tested with another FDA cleared, approved, or authorized test, if coinfection would change clinical management. This test was validated by the [facility]. These laboratories are certified under the Clinical  Laboratory Improvement Amendments of 1988 (CLIA-88) as qualified to perform high complexity laboratory testing.     Medications   oxyCODONE (ROXICODONE) tablet 5 mg (5 mg Oral Given 1/20/22 2152)        Assessments & Plan (with Medical Decision Making)   Patient here complaining of numbness in her legs and feet and inability to ambulate.  Review of the chart reveals 2 recent emergency department visits in the last 3 days complaining of chest pain and then again of abdominal pain both times she had extensive work-ups that were negative.  She has a long history and a history of extensive use of the emergency department primarily in [place] where she calls this home and she is frequently in [place] where she is here to visit friends.  She was supposed to drive back to [place] today but her car was getting service.  She has no objective findings and her complaints are a manifestation of her known and documented conversion disorder.  She is encouraged to follow-up with her providers when she returns to [place].  There is no criteria for admission or psychiatric hospitalization.   She was discharged with bus token.    I have reviewed the nursing notes. I have reviewed the findings, diagnosis, plan and need for follow up with the patient.    Discharge Medication List as of 1/20/2022 10:16 PM          Final diagnoses:   Conversion disorder     I, Katy Helms, am serving as a trained medical scribe to document services personally performed by Kirk Rojas MD based on the provider's statements to me on January 20, 2022.  This document has been checked and approved by the attending provider.    I, Kirk Rojas MD, was physically present and have reviewed and verified the accuracy of this note documented by Katy Helms, medical scribe.      Kirk Rojas MD      --  Kirk Rojas MD  [facility] EMERGENCY DEPARTMENT  1/20/2022     Kirk Rojas MD  01/21/22 0210

## 2022-01-21 NOTE — DISCHARGE INSTRUCTIONS
I have reviewed the tests from your 2 recent Emergency Department visits and there are no significant abnormal findings.  The nerves in your low back and legs are functioning normally.  Follow-up with your doctor when you return to [place]

## 2022-01-24 ENCOUNTER — OFFICE VISIT (OUTPATIENT)
Dept: FAMILY MEDICINE | Facility: CLINIC | Age: 32
End: 2022-01-24
Payer: MEDICARE

## 2022-01-24 VITALS
TEMPERATURE: 99.4 F | BODY MASS INDEX: 36.22 KG/M2 | WEIGHT: 196.8 LBS | OXYGEN SATURATION: 96 % | SYSTOLIC BLOOD PRESSURE: 130 MMHG | HEIGHT: 62 IN | HEART RATE: 118 BPM | DIASTOLIC BLOOD PRESSURE: 80 MMHG

## 2022-01-24 DIAGNOSIS — M54.42 CHRONIC BILATERAL LOW BACK PAIN WITH LEFT-SIDED SCIATICA: ICD-10-CM

## 2022-01-24 DIAGNOSIS — G89.29 CHRONIC BILATERAL LOW BACK PAIN WITH LEFT-SIDED SCIATICA: ICD-10-CM

## 2022-01-24 DIAGNOSIS — K50.10 CROHN'S DISEASE OF COLON WITHOUT COMPLICATION (H): ICD-10-CM

## 2022-01-24 DIAGNOSIS — J01.01 ACUTE RECURRENT MAXILLARY SINUSITIS: Primary | ICD-10-CM

## 2022-01-24 PROCEDURE — U0005 INFEC AGEN DETEC AMPLI PROBE: HCPCS | Performed by: FAMILY MEDICINE

## 2022-01-24 PROCEDURE — 99214 OFFICE O/P EST MOD 30 MIN: CPT | Performed by: FAMILY MEDICINE

## 2022-01-24 PROCEDURE — T1013 SIGN LANG/ORAL INTERPRETER: HCPCS | Mod: U3 | Performed by: FAMILY MEDICINE

## 2022-01-24 RX ORDER — CYCLOBENZAPRINE HCL 10 MG
10 TABLET ORAL 3 TIMES DAILY PRN
Qty: 45 TABLET | Refills: 0 | Status: SHIPPED | OUTPATIENT
Start: 2022-01-24 | End: 2022-02-02

## 2022-01-24 RX ORDER — ONDANSETRON 4 MG/1
4 TABLET, FILM COATED ORAL EVERY 8 HOURS PRN
Qty: 12 TABLET | Refills: 0 | Status: SHIPPED | OUTPATIENT
Start: 2022-01-24

## 2022-01-24 RX ORDER — OXYCODONE HYDROCHLORIDE 5 MG/1
5 CAPSULE ORAL EVERY 4 HOURS PRN
Qty: 12 CAPSULE | Refills: 0 | Status: CANCELLED | OUTPATIENT
Start: 2022-01-24

## 2022-01-24 ASSESSMENT — MIFFLIN-ST. JEOR: SCORE: 1555.93
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
## 2022-01-25 ENCOUNTER — TELEPHONE (OUTPATIENT)
Dept: GASTROENTEROLOGY | Facility: CLINIC | Age: 32
End: 2022-01-25
Payer: MEDICARE

## 2022-01-25 LAB
SARS-COV-2 RNA RESP QL NAA+PROBE: NORMAL
SARS-COV-2 RNA RESP QL NAA+PROBE: NOT DETECTED

## 2022-01-25 NOTE — TELEPHONE ENCOUNTER
Prior Authorization Retail Medication Request    Medication/Dose: budesonide (ENTOCORT EC) 3 MG EC capsule  ICD code (if different than what is on RX):    Previously Tried and Failed:    Rationale:      Insurance Name:    Insurance ID:        Pharmacy Information (if different than what is on RX)  Name:    Phone:      Ruma Elliott RN, BSN  Gastroenterology Nurse Care Coordinator  Phone: [phone]  Fax: [phone]

## 2022-01-26 ENCOUNTER — OFFICE VISIT (OUTPATIENT)
Dept: FAMILY MEDICINE | Facility: CLINIC | Age: 32
End: 2022-01-26
Payer: MEDICARE

## 2022-01-26 VITALS
BODY MASS INDEX: 36.21 KG/M2 | RESPIRATION RATE: 16 BRPM | OXYGEN SATURATION: 98 % | DIASTOLIC BLOOD PRESSURE: 74 MMHG | HEART RATE: 111 BPM | TEMPERATURE: 99 F | SYSTOLIC BLOOD PRESSURE: 126 MMHG | WEIGHT: 198 LBS

## 2022-01-26 DIAGNOSIS — K50.919 CROHN'S DISEASE WITH COMPLICATION, UNSPECIFIED GASTROINTESTINAL TRACT LOCATION (H): Primary | ICD-10-CM

## 2022-01-26 DIAGNOSIS — M51.16 LUMBAR DISC DISEASE WITH RADICULOPATHY: ICD-10-CM

## 2022-01-26 DIAGNOSIS — K21.9 GASTROESOPHAGEAL REFLUX DISEASE WITHOUT ESOPHAGITIS: ICD-10-CM

## 2022-01-26 DIAGNOSIS — F41.9 ANXIETY: ICD-10-CM

## 2022-01-26 DIAGNOSIS — Z79.2 LONG TERM (CURRENT) USE OF ANTIBIOTICS: ICD-10-CM

## 2022-01-26 DIAGNOSIS — F43.23 ADJUSTMENT DISORDER WITH MIXED ANXIETY AND DEPRESSED MOOD: ICD-10-CM

## 2022-01-26 PROCEDURE — 99214 OFFICE O/P EST MOD 30 MIN: CPT | Performed by: FAMILY MEDICINE

## 2022-01-26 RX ORDER — LIDOCAINE HYDROCHLORIDE 20 MG/ML
15 SOLUTION OROPHARYNGEAL EVERY 4 HOURS PRN
Qty: 100 ML | Refills: 3 | Status: SHIPPED | OUTPATIENT
Start: 2022-01-26

## 2022-01-26 RX ORDER — FLUCONAZOLE 150 MG/1
150 TABLET ORAL ONCE
Qty: 1 TABLET | Refills: 0 | Status: SHIPPED | OUTPATIENT
Start: 2022-01-26 | End: 2022-01-26

## 2022-01-26 RX ORDER — ARIPIPRAZOLE 30 MG/1
30 TABLET ORAL AT BEDTIME
Qty: 30 TABLET | Refills: 0 | Status: SHIPPED | OUTPATIENT
Start: 2022-01-26 | End: 2022-02-09

## 2022-01-26 ASSESSMENT — PAIN SCALES - GENERAL: PAINLEVEL: NO PAIN (0)

## 2022-01-26 NOTE — NURSING NOTE
Clinic Administered Medication Documentation    Administrations This Visit     lidocaine (viscous) (XYLOCAINE) 2 % 15 mL, alum & mag hydroxide-simethicone (MAALOX) 15 mL GI Cocktail     Admin Date  01/26/2022 Action  Given Dose  30 mL Route  Oral Site   Administered By  Shaw Aguilar MA    Ordering Provider: Bacilio Adams MD    Patient Supplied?: No                Oral Medication Documentation    Patient was given Lidocaine Viscous 2% and Maalox . Prior to medication administration, verified patients identity using patient s name and date of birth. Please see MAR and medication order for additional information.     Was entire amount of medication used? Yes  Expiration Date: Lidocaine Exp: 08/30/22 and Maalox Exp: 06/30/23    Shaw Aguilar MA on 1/26/2022 at 10:56 AM

## 2022-01-26 NOTE — TELEPHONE ENCOUNTER
Pending Prescriptions:                       Disp   Refills    ARIPiprazole (ABILIFY) 30 MG tablet        30 tab*0        Sig: Take 1 tablet (30 mg) by mouth At Bedtime    Routing refill request to provider for review/approval because:  Requesting 90 day supply please advise

## 2022-01-26 NOTE — PROGRESS NOTES
Assessment & Plan   1. Crohn's disease with complication, unspecified gastrointestinal tract location (H): Currently untreated.  Attempting to get budesonide covered through insurance.  I will place a social work referral but may need to work with GI on best available coverage options.  - Care Coordination Referral; Future    2. Gastroesophageal reflux disease without esophagitis: Refilled her lidocaine.  - lidocaine, viscous, (XYLOCAINE) 2 % solution; Swish and swallow 15 mLs in mouth every 4 hours as needed for other (GERD) ; Max 8 doses/24 hour period. Mix with 15 mLs Maalox or Mylanta.  Dispense: 100 mL; Refill: 3  - lidocaine (viscous) (XYLOCAINE) 2 % 15 mL, alum & mag hydroxide-simethicone (MAALOX) 15 mL GI Cocktail    3. Lumbar disc disease with radiculopathy: I did discuss that opioid narcotics are not indicated for prolonged control.  I discussed I would not prescribe Norco or anything stronger than Tylenol 3.  I did fill her Tylenol 3.  PDMP reviewed.  Continue follow-up with spine.  - acetaminophen-codeine (TYLENOL #3) 300-30 MG tablet; Take 1-2 tablets by mouth every 6 hours as needed for severe pain  Dispense: 25 tablet; Refill: 0    4. Long term (current) use of antibiotics: Refilled fluconazole.  - fluconazole (DIFLUCAN) 150 MG tablet; Take 1 tablet (150 mg) by mouth once for 1 dose  Dispense: 1 tablet; Refill: 0      Return in about 2 weeks (around 2/9/2022), or if symptoms worsen or fail to improve.    Bacilio Adams MD  [facility]    This chart is completed utilizing dictation software; typos and/or incorrect word substitutions may unintentionally occur.      Alex Burns is a 31 year old who presents for the following health issues:    History of Present Illness       She eats 2-3 servings of fruits and vegetables daily.She consumes 1 sweetened beverage(s) daily.She exercises with enough effort to increase her heart rate 30 to 60 minutes per day.  She  "exercises with enough effort to increase her heart rate 3 or less days per week. She is missing 3 dose(s) of medications per week.     Patient states that she was started on Augmentin yesterday for her cough and sinus infection.  Having vaginal itching and discharge, concern of yeast infection after starting antibiotic.    Patient is having ongoing abdominal pain.  Following with GI.  Had colonoscopy.  Following biopsies and history of Crohn's was ordered budesonide.  She is concerned about cost.  She is wanting treatment with his  to discuss options for obtaining this medication.  States she was previously on prednisone which made her \"go crazy \".    Has not been able to afford her mesalamine either.    Like refill of her lidocaine for GERD.    Meeting with spine in the near future.  Still dealing with back pain.  Is looking for refill of Norco.  She does have multiple controlled substance scripts from multiple providers and is already used up her 12 oxycodone from ER visit on 1/19/2022.    PDMP reviewed.    Review of Systems   Constitutional, HEENT, msk, neuro, derm, cardiovascular, pulmonary, gi and gu systems are negative, except as otherwise noted.      Objective    /74   Pulse 111   Temp 99  F (37.2  C) (Temporal)   Resp 16   Wt 89.8 kg (198 lb)   SpO2 98%   BMI 36.21 kg/m    Body mass index is 36.21 kg/m .  Physical Exam   General: Appears well and in no acute distress.  Cardiovascular: Regular rate and rhythm, normal S1 and S2 without murmur. No extra heartsounds or friction rub. Radial pulses present and equal bilaterally.  Respiratory: Lungs clear to auscultation bilaterally. No wheezing or crackles. No prolonged expiration. Symmetrical chest rise.  Musculoskeletal: No gross extremity deformities. No peripheral edema. Normal muscle bulk.  GI/Rectal: Epigastric abdominal pain. Otherwise soft, non-tender abdomen. No hepatosplenomegaly. Normal active bowel sounds.    Lab: none        "

## 2022-01-27 NOTE — TELEPHONE ENCOUNTER
Prior Authorization Approval    Authorization Effective Date: 10/29/2021  Authorization Expiration Date: 4/27/2022  Medication: budesonide (ENTOCORT EC) 3 MG EC capsule-PA APPROVED   Approved Dose/Quantity:   Reference #:     Insurance Company: [employer] - Phone [phone] Fax [phone]  Expected CoPay: $1,200      CoPay Card Available:      Foundation Assistance Needed:    Which Pharmacy is filling the prescription (Not needed for infusion/clinic administered): [id number] - [place] - [address] AT [facility]]  Pharmacy Notified: Yes- **Instructed pharmacy to notify patient when script is ready to /ship.**- Pharmacy stated that they have a paid claim on medication quantity 225 for 75 day supply; however, co-pay is high. Pharmacy stated that co-pay is high possibly due to patients deductible. Requested pharmacy to contact patient on cost prior to picking up medication. Cash price Quantity 225 is $3,000.   Patient Notified: Yes

## 2022-01-27 NOTE — TELEPHONE ENCOUNTER
Her, Tereso  You Just now (11:12 AM)         PA APPROVED. Called pharmacy and pharmacy stated that they have a paid claim on medication quantity 225 for 75 day supply; however, co-pay is high. Pharmacy stated that co-pay is high possibly due to patients deductible. Requested pharmacy to contact patient on cost prior to picking up medication. Please close encounter when finished. Thank You Central PA Team.

## 2022-01-27 NOTE — TELEPHONE ENCOUNTER
Central Prior Authorization Team   Phone: [phone]    PA Initiation    Medication: budesonide (ENTOCORT EC) 3 MG EC capsule  Insurance Company: [employer] - Phone [phone] Fax [phone]  Pharmacy Filling the Rx: [id number] - [place], [place] - [address] AT [facility]  Filling Pharmacy Phone: [phone]  Filling Pharmacy Fax: [phone]  Start Date: 1/27/2022

## 2022-01-28 ENCOUNTER — TELEPHONE (OUTPATIENT)
Dept: FAMILY MEDICINE | Facility: CLINIC | Age: 32
End: 2022-01-28
Payer: MEDICARE

## 2022-01-28 DIAGNOSIS — N89.8 VAGINAL LESION: Primary | ICD-10-CM

## 2022-01-28 NOTE — TELEPHONE ENCOUNTER
Reason for Call:  Other     Detailed comments: patient would like a referral to see an OBGYN for peeling skin on her vagina and would also like to talk about not having a menstrual period even after stopping the depo shot     Phone Number Patient can be reached at: Home number on file [phone] (home)    Best Time: any    Can we leave a detailed message on this number? YES    Call taken on 1/28/2022 at 9:43 AM by Brittaney Guzman

## 2022-01-28 NOTE — TELEPHONE ENCOUNTER
Depending on her last depo shot, it can remain in your system for awhile before periods return or become regular.    I have given her the referral as requested. Giver her the number to schedule.    Bacilio Adams MD  [facility]

## 2022-01-30 ENCOUNTER — NURSE TRIAGE (OUTPATIENT)
Dept: NURSING | Facility: CLINIC | Age: 32
End: 2022-01-30
Payer: MEDICARE

## 2022-01-31 ENCOUNTER — HOSPITAL ENCOUNTER (EMERGENCY)
Facility: CLINIC | Age: 32
Discharge: HOME | End: 2022-01-31
Attending: EMERGENCY MEDICINE | Admitting: EMERGENCY MEDICINE
Payer: MEDICARE

## 2022-01-31 VITALS
DIASTOLIC BLOOD PRESSURE: 91 MMHG | HEART RATE: 117 BPM | HEIGHT: 62 IN | RESPIRATION RATE: 16 BRPM | TEMPERATURE: 97.6 F | OXYGEN SATURATION: 99 % | WEIGHT: 185 LBS | BODY MASS INDEX: 34.04 KG/M2 | SYSTOLIC BLOOD PRESSURE: 120 MMHG

## 2022-01-31 DIAGNOSIS — A60.00 GENITAL HERPES SIMPLEX, UNSPECIFIED SITE: ICD-10-CM

## 2022-01-31 LAB
C TRACH DNA SPEC QL NAA+PROBE: NEGATIVE
CLUE CELLS: NORMAL
HCG UR QL: NEGATIVE
INTERNAL QC OK POCT: NORMAL
N GONORRHOEA DNA SPEC QL NAA+PROBE: NEGATIVE
POCT KIT EXPIRATION DATE: NORMAL
POCT KIT LOT NUMBER: NORMAL
TRICHOMONAS, WET PREP: NORMAL
WBC'S/HIGH POWER FIELD, WET PREP: NORMAL
YEAST, WET PREP: NORMAL

## 2022-01-31 PROCEDURE — 87491 CHLMYD TRACH DNA AMP PROBE: CPT | Performed by: EMERGENCY MEDICINE

## 2022-01-31 PROCEDURE — 87591 N.GONORRHOEAE DNA AMP PROB: CPT | Performed by: EMERGENCY MEDICINE

## 2022-01-31 PROCEDURE — 87210 SMEAR WET MOUNT SALINE/INK: CPT | Performed by: EMERGENCY MEDICINE

## 2022-01-31 PROCEDURE — 99284 EMERGENCY DEPT VISIT MOD MDM: CPT | Performed by: EMERGENCY MEDICINE

## 2022-01-31 PROCEDURE — 99283 EMERGENCY DEPT VISIT LOW MDM: CPT | Performed by: EMERGENCY MEDICINE

## 2022-01-31 PROCEDURE — 81025 URINE PREGNANCY TEST: CPT | Performed by: EMERGENCY MEDICINE

## 2022-01-31 RX ORDER — VALACYCLOVIR HYDROCHLORIDE 1 G/1
1000 TABLET, FILM COATED ORAL 2 TIMES DAILY
Qty: 20 TABLET | Refills: 0 | Status: SHIPPED | OUTPATIENT
Start: 2022-01-31 | End: 2022-02-10

## 2022-01-31 RX ORDER — APIXABAN 5 MG/1
5 TABLET, FILM COATED ORAL 2 TIMES DAILY
COMMUNITY
Start: 2021-10-28 | End: 2022-02-04

## 2022-01-31 ASSESSMENT — MIFFLIN-ST. JEOR: SCORE: 1502.4

## 2022-01-31 NOTE — ED PROVIDER NOTES
"ED Provider Note  [facility]      History     Chief Complaint   Patient presents with     Vaginal Discharge     Abdominal Pain     HPI  Katy Islas is a 31 year old female with a complex past medical history, including hearing loss, Crohn's disease, depression, anxiety, chronic pain, bipolar, mild asthma, chronic back pain, post lumbar fusion, multiple orthopedic pain complaints, concern for drug-seeking behavior and borderline personality presents to the ED stating that, \"all the skin around my vagina and anus is falling off.\"  Of note, iPad sign  was used due to the patient's hearing impaired status.  She reports that the primary concern is the extremely irritated feeling in her genital region.  She also complains of some discomfort up into her vagina, and in the little bit tracking into her pelvis.  She has mild discomfort with urination as well, primarily due to the irritation.  She reports she did vomit today, did have a couple of episodes of diarrhea.  No notable abdominal pain.  No fever, cough, shortness of breath.  She does report that she did have genital herpes outbreak in the past, but has not had an outbreak in 10 years.  She states this feels worse than she recalls from a previous outbreak.  She reports that she is not currently sexually active, has not been sexually active in 3 years.  She does report some whitish vaginal discharge recently.    Past Medical History  Past Medical History:   Diagnosis Date     Abdominal pain 10/31- 11/4/2005    [facility] admit for Crohn's     Acute ulcerative colitis (H)      Allergic rhinitis, cause unspecified     Allergic rhinitis     Arthritis      C. difficile diarrhea     Past, no current diarrhea.     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, without mention of intractable epilepsy     pseudoseizures diagnosed after extensive neurologic eval     Migraine 07/21/12    D/C 07/22/12-[facility]     Migraine, unspecified, without mention of intractable migraine without mention of status migrainosus     Migraine     Mild intermittent asthma     mild intermittent     Mycoplasma infection in conditions classified elsewhere and of unspecified site      Other chronic pain     Back pain for 6 years     Renal disease     Kidney stones     Unspecified hearing loss     congenital hearing loss     Past Surgical History:   Procedure Laterality Date     AS REMOVAL OF COCCYX  10/18/2017     COLONOSCOPY  7/1/2009    [facility].     COLONOSCOPY N/A 7/17/2019    Procedure: Colonoscopy, With Polypectomy And Biopsy;  Surgeon: Edwin Conde MD;  Location:  OR     COLONOSCOPY N/A 1/11/2022    Procedure: COLONOSCOPY, WITH POLYPECTOMY AND BIOPSY;  Surgeon: Bob Ugarte DO;  Location: [facility]     COLONOSCOPY WITH CO2 INSUFFLATION N/A 7/17/2019    Procedure: COLONOSCOPY, WITH CO2 INSUFFLATION;  Surgeon: Edwin Conde MD;  Location:  OR     COMBINED ESOPHAGOSCOPY, GASTROSCOPY, DUODENOSCOPY (EGD) WITH CO2 INSUFFLATION N/A 4/12/2019    Procedure: COMBINED ESOPHAGOSCOPY, GASTROSCOPY, DUODENOSCOPY (EGD) WITH CO2 INSUFFLATION;  Surgeon: Edwin Conde MD;  Location:  OR     ESOPHAGOSCOPY, GASTROSCOPY, DUODENOSCOPY (EGD), COMBINED N/A 4/12/2019    Procedure: Combined Esophagoscopy, Gastroscopy, Duodenoscopy (Egd), Biopsy Single Or Multiple;  Surgeon: Edwin Conde MD;  Location:  OR      FISTULOTOMY RECTUM N/A 4/30/2020    Procedure: EXAM UNDER ANESTHESIA, ANUS, parital fistulotomy;  Surgeon: Valentin Sanchez MD;  Location[facility]     FUSION SPINE POSTERIOR MINIMALLY INVASIVE ONE LEVEL N/A 2/23/2017    Procedure: FUSION SPINE POSTERIOR MINIMALLY INVASIVE ONE LEVEL;  L4-5 Oblique Lateral Lumbar Interbody Fusion   Epidural steroid injection.   Transpedicular Bone marrow aspiration;  Surgeon: Jeniffer Eugene MD;  Location: [facility] OR     HC EEG AWAKE AND SLEEP      abnormal     HC MRI BRAIN W/O CONTRAST  12/00    normal     HC REMOVAL GALLBLADDER  8/5/2009    [facility]     HC UGI ENDOSCOPY DIAG W BIOPSY  11/11/09    Normal esophagus     ORTHOPEDIC SURGERY  October 19,2011    diskectomy L4-L5     ZZC FUSION OF SACROILIAC JOINT  10/26/2018     [facility] COLONOSCOPY THRU STOMA WITH BIOPSY  10/29/2003    Impression is that of normal appearing colonoscopy, without evidence of rectal bleeding.     [facility] COLONOSCOPY THRU STOMA, DIAGNOSTIC  10/00    normal     [facility] COLONOSCOPY THRU STOMA, DIAGNOSTIC  Oct 2009    Dr López- normal     [facility] UGI ENDOSCOPY, SIMPLE EXAM  7/00, 10/00    mild chronic esophagitis and duodenitis, neg H pylori     [facility] UGI ENDOSCOPY, SIMPLE EXAM  01/20/2005    Esophagogastroduodenoscopy, colonoscopy with biopsies.  [facility] UGI ENDOSCOPY, SIMPLE EXAM  7/1/2009    [facility]     [facility] UGI ENDOSCOPY, SIMPLE EXAM  11/11/2009    attempted upper GI, pt. could not tolerate procedure:[facility]     acetaminophen-codeine (TYLENOL #3) 300-30 MG tablet  amoxicillin-clavulanate (AUGMENTIN) 875-125 MG tablet  ARIPiprazole (ABILIFY) 30 MG tablet  dicyclomine (BENTYL) 20 MG tablet  DULoxetine (CYMBALTA) 30 MG capsule  ELIQUIS ANTICOAGULANT 5 MG tablet  gabapentin (NEURONTIN) 300 MG capsule  NIFEdipine ER OSMOTIC (PROCARDIA XL) 60 MG 24 hr tablet  pantoprazole (PROTONIX) 40 MG EC tablet  sucralfate (CARAFATE) 1 GM  tablet  valACYclovir (VALTREX) 1000 mg tablet  acetaminophen-codeine (TYLENOL #3) 300-30 MG tablet  albuterol (PROAIR HFA/PROVENTIL HFA/VENTOLIN HFA) 108 (90 Base) MCG/ACT inhaler  azelastine (OPTIVAR) 0.05 % ophthalmic solution  budesonide (ENTOCORT EC) 3 MG EC capsule  cyclobenzaprine (FLEXERIL) 10 MG tablet  EPINEPHrine (ANY BX GENERIC EQUIV) 0.3 MG/0.3ML injection 2-pack  lidocaine, viscous, (XYLOCAINE) 2 % solution  ondansetron (ZOFRAN) 4 MG tablet  polyethylene glycol (MIRALAX) 17 g packet  polyethylene glycol (MIRALAX) 17 GM/Dose powder  rizatriptan (MAXALT-MLT) 5 MG ODT      Allergies   Allergen Reactions     Dexamethasone Unknown     Iohexol Hives     Other reaction(s): Hives  IV contrast; patient states she tolerates contrast if she gets benadryl before  IV contrast; patient states she tolerates contrast if she gets benadryl before     Nsaids Other (See Comments) and Hives     GI BLEED x2  Tolerated toradol     Other Environmental Allergy Rash     Plastic tape     Acetaminophen Itching     Baclofen      hives     Bees Hives, Swelling and Difficulty breathing     Caffeine      Contrast Dye      Hives,   Updated 5/10/2016 CT Contrast.     Iodine Hives     Metoclopramide      Other reaction(s): Tremors  LW Reaction: shaking/sweating     Midazolam      Other reaction(s): Agitation     Monosodium Glutamate      Morphine Other (See Comments)     Difficulty with urination     Other (Do Not Use) Other (See Comments)     Xanaflex- pt becomes disoriented and loses bladder control     Reglan [Metoclopramide Hcl] Other (See Comments)     shaking     Soma [Carisoprodol] Visual Disturbance     Sleep walking     Tizanidine      Topamax Other (See Comments)     Topamax [Topiramate] Nausea     Tingling  GI/Vomit     Tramadol      Severe Headache, Seizure Risk     Tylenol W/Codeine [Acetaminophen-Codeine] Nausea and Itching     Tylenol 3     Versed Other (See Comments)     Zolmitriptan      Makes face feel like its  "twitching     Droperidol Anxiety     Flu Virus Vaccine Rash      Arm swelling     Hydrocodone-Acetaminophen Nausea and Vomiting and Itching     Pt denies allergy 1/3/11  Pt denies 10/2/18     Family History  Family History   Problem Relation Age of Onset     Gastrointestinal Disease Brother         severe Crohn's     Neurologic Disorder Brother         Seizures post head injury     Depression Brother      Substance Abuse Brother      Genitourinary Problems Father         kidney stones     Diabetes Father      Heart Disease Father         Open heart surgery     Breast Cancer Maternal Grandmother      Parkinsonism Maternal Grandmother      Cerebrovascular Disease Paternal Grandmother      Cancer Maternal Grandfather         Lung     Cardiovascular Paternal Grandfather         Heart Attack     Substance Abuse Mother         in recovery x1 year      Lung Cancer Paternal Uncle      Cancer Paternal Uncle      Lung Cancer Maternal Uncle      Colon Cancer Maternal Uncle      Social History   Social History     Tobacco Use     Smoking status: Former Smoker     Packs/day: 0.25     Years: 5.00     Pack years: 1.25     Types: Cigarettes     Smokeless tobacco: Never Used   Vaping Use     Vaping Use: Former     Substances: CBD     Devices: Disposable, Pre-filled or refillable cartridge   Substance Use Topics     Alcohol use: Not Currently     Comment: Not since last July 2018     Drug use: Not Currently     Types: Marijuana     Comment: Medical Marijuana currently-- More CBD      Past medical history, past surgical history, medications, allergies, family history, and social history were reviewed with the patient. No additional pertinent items.       Review of Systems  A complete review of systems was performed with pertinent positives and negatives noted in the HPI, and all other systems negative.    Physical Exam   BP: (!) 119/90  Pulse: (!) 128  Temp: 97.6  F (36.4  C)  Resp: 16  Height: 157.5 cm (5' 2\")  Weight: 83.9 kg (185 " lb)  SpO2: 97 %  Physical Exam  Constitutional:       General: She is not in acute distress.     Appearance: She is not diaphoretic.   HENT:      Head: Atraumatic.   Eyes:      General: No scleral icterus.  Cardiovascular:      Heart sounds: Normal heart sounds.   Pulmonary:      Effort: No respiratory distress.      Breath sounds: Normal breath sounds.   Abdominal:      Palpations: Abdomen is soft.      Tenderness: There is no abdominal tenderness.   Genitourinary:     Vagina: Vaginal discharge (scant white) present.          Comments: No notable adnexal tenderness or cervical motion tenderness.  Musculoskeletal:         General: No tenderness.   Skin:     General: Skin is warm.      Findings: No rash.         ED Course      Procedures                     Results for orders placed or performed during the hospital encounter of 01/31/22   hCG qual urine POCT     Status: Normal   Result Value Ref Range    HCG Qual Urine Negative Negative    Internal QC Check POCT Valid Valid    POCT Kit Lot Number [id number]     POCT Kit Expiration Date 3/31/23    Wet prep     Status: Normal    Specimen: Vagina; Swab   Result Value Ref Range    Trichomonas Absent Absent    Yeast Absent Absent    Clue Cells Absent Absent    WBCs/high power field None None     Medications - No data to display     Assessments & Plan (with Medical Decision Making)   There is no evidence for any skin sloughing, though I did see several small superficial ulcers on the left labia minora.  She does have what looks like some mild irritation throughout her genital region.  No other ulcers are seen.  No cervical motion tenderness on exam.  Bedside UPT was negative.  I do suspect that she is having herpetic outbreak, does admit she have been diagnosed with herpes in the past, though states her last outbreak was 10 years ago.  She is not currently sexually active.  Wet prep came back negative, though she had asked for discharge prior to that, stating she was tired and  just wanted to get home.  I will give her treatment with Valtrex.  I suspect that this is the cause for her symptoms.  Abdominal exam is otherwise benign.  She does already have a follow-up scheduled in a few days with her GI doctor and is also encouraged to follow-up with her primary care provider.  She is encouraged to return with any concerns.  She expresses understanding and is agreeable to the plan.    Dictation Disclaimer: Some of this Note has been completed with voice-recognition dictation software. Although errors are generally corrected real-time, there is the potential for a rare error to be present in the completed chart.      I have reviewed the nursing notes. I have reviewed the findings, diagnosis, plan and need for follow up with the patient.    Discharge Medication List as of 1/31/2022  2:33 AM      START taking these medications    Details   valACYclovir (VALTREX) 1000 mg tablet Take 1 tablet (1,000 mg) by mouth 2 times daily for 10 days, Disp-20 tablet, R-0, E-Prescribe             Final diagnoses:   Genital herpes simplex, unspecified site       --  Liza Cain  [facility] EMERGENCY DEPARTMENT  1/31/2022     Liza Cain MD  01/31/22 0434

## 2022-01-31 NOTE — ED TRIAGE NOTES
"Patient presents with abdominal pain, vaginal discharge, and vaginal pain. Denies fever or chills. States, \"I noticed the pain a couple of days ago. The skin is sloughing off my vulva. There is white discharge. I am also taking antibiotics for a sinus infection\". Patient also reports pain at rectum when having a bowel movement. Reports hx crohn's and occasional blood in stool. Reports pain in perineum and abdomen when voiding. Amita Turner RN on 1/31/2022 at 1:54 AM    "

## 2022-01-31 NOTE — ED NOTES
Upon triage, patient found to have elevated heart rate. Patient placed on cardiac monitor. Patient states that she has been seen by cardiology for this, unknown cause. Taking nifedipine and Eliquis as prescribed. Amita Turner RN on 1/31/2022 at 1:57 AM

## 2022-01-31 NOTE — TELEPHONE ENCOUNTER
"Call rec'd via . Patient is currently in [place]. She states that her Dr put her on Diflucan for possible yeast infection (out of medication), and another medication for bacterial vaginosis. The skin is peeling on the outer lips (labia). It looks worse than even 1-2 days ago\". It is not bleeding. Uncomfortable. No itching. Currently lower right sided flank pain=\"5\". Pelvic pain=\"across the lower right flank, and the back, the back is the worst=\"6-7\". She has taken Tylenol with codeine recently prescribed for her back pain and it didn't help it at all. No fever noted. \"It hurts a little bit in my lower belly when I urinate\".   \"I would like to make an appointment for as soon as possible: tomorrow in the clinic\".   (Per chart review: caller was last seen in ER yesterday).  Call transferred to  as requested.     Doretha Rachel RN Triage Nurse Advisor 11:19 PM 1/30/2022    Reason for Disposition    Pain or burning with passing urine (urination)    Additional Information    Negative: Difficult to awaken or acting confused (e.g., disoriented, slurred speech)    Negative: [1] SEVERE pain (e.g., excruciating, scale 8-10) AND [2] present > 1 hour    Negative: [1] SEVERE pain (e.g., excruciating, scale 8-10) AND [2] not improved after pain medicine    Negative: [1] Sudden onset of severe flank pain AND [2] age > 60    Negative: [1] Abdominal pain AND [2] age > 60    Negative: [1] Unable to urinate (or only a few drops) > 4 hours AND     [2] bladder feels very full (e.g., palpable bladder or strong urge to urinate)    Negative: Fever > 100.4 F (38.0 C)    Protocols used: FLANK PAIN-A-AH      "

## 2022-01-31 NOTE — ED NOTES
Pelvic exam performed by Dr. Cain, chaperoned by writer. Amita Turner RN on 1/31/2022 at 2:28 AM

## 2022-02-01 ENCOUNTER — TELEPHONE (OUTPATIENT)
Dept: FAMILY MEDICINE | Facility: CLINIC | Age: 32
End: 2022-02-01
Payer: MEDICARE

## 2022-02-01 ENCOUNTER — PATIENT OUTREACH (OUTPATIENT)
Dept: CARE COORDINATION | Facility: CLINIC | Age: 32
End: 2022-02-01
Payer: MEDICARE

## 2022-02-01 ENCOUNTER — NURSE TRIAGE (OUTPATIENT)
Dept: FAMILY MEDICINE | Facility: CLINIC | Age: 32
End: 2022-02-01
Payer: MEDICARE

## 2022-02-01 NOTE — PROGRESS NOTES
Assessment & Plan   1. Anal pain: Likely pruritus ani.  Treat with calmoseptine.  No evidence of fistula, external hemorrhoid, skin tag, or other causative pathology.  - menthol-zinc oxide (CALMOSEPTINE) 0.44-20.625 % OINT ointment; Apply topically 4 times daily as needed for skin protection or irritation  Dispense: 113 g; Refill: 1    2. Pelvic pain in female: We will check wet prep and UA.  Could consider pelvic ultrasound if continuing.  Patient did however just have a recent CT on 1/19/2022 without significant findings.  Reassured patient.  Treat pending results.  - Wet prep - lab collect; Future  - UA with Microscopic reflex to Culture - lab collect; Future  - UA with Microscopic reflex to Culture - lab collect  - Wet prep - lab collect  - Urine Microscopic    3. Chronic bilateral low back pain with left-sided sciatica: Refilled medication per patient request.  - cyclobenzaprine (FLEXERIL) 10 MG tablet; Take 1 tablet (10 mg) by mouth 3 times daily as needed (pain)  Dispense: 90 tablet; Refill: 0    4. Pain of left calf: Improving pain.  No evidence of swelling.  No risk factors for DVT.  Will check electrolytes.  Encourage stretches.  Can use Flexeril as above.  Declined refilling her benzos, Tylenol 3, or Norco today.  May continue using Tylenol 3 but I want her to extend out how often she is using this.  - Basic metabolic panel  (Ca, Cl, CO2, Creat, Gluc, K, Na, BUN); Future  - Magnesium; Future  - Magnesium  - Basic metabolic panel  (Ca, Cl, CO2, Creat, Gluc, K, Na, BUN)      Return in about 2 weeks (around 2/16/2022), or if symptoms worsen or fail to improve.    Bacilio Adams MD  [facility]    This chart is completed utilizing dictation software; typos and/or incorrect word substitutions may unintentionally occur.      Alex Burns is a 31 year old who presents for the following health issues  accompanied by her .    lower extremity pain  The  "symptoms are aggravated by movement.      Patient reports left calf pain 8 out of 10 yesterday.  Improved today.  Due to cramping.  No known trauma.  Happens intermittently in the past.  Relates this to her back.    Also reporting suprapubic pelvic pain.  Has not menstruated since her last Depo shot and is now discontinued these.  No sexual activity.    Also endorsing pain around the anus.  Associated itching.  She is concerned about a fistula given her past history of Crohn's.    She denies any fever, chills, history of kidney stones.    Answers for HPI/ROS submitted by the patient on 2/2/2022  Incident occurred: 3 to 5 days ago  Incident location: at home  Injury mechanism: unknown  Pain quality: aching, cramping  Pain - numeric: 8/10  Pain course: intermittent  tingling: Yes  loss of motion: Yes  Foreign body present: no foreign bodies    Review of Systems   Constitutional, HEENT, msk, neuro, cardiovascular, pulmonary, gi and gu systems are negative, except as otherwise noted.      Objective    /86 (BP Location: Left arm, Patient Position: Chair, Cuff Size: Adult Large)   Pulse 108   Temp 97.4  F (36.3  C) (Temporal)   Resp 18   Ht 1.575 m (5' 2\")   Wt 90.3 kg (199 lb)   LMP  (Exact Date)   SpO2 100%   Breastfeeding No   BMI 36.40 kg/m    Body mass index is 36.4 kg/m .  Physical Exam   General: Appears well and in no acute distress.  Cardiovascular: Regular rate and rhythm, normal S1 and S2 without murmur. No extra heartsounds or friction rub. Radial pulses present and equal bilaterally.  Respiratory: Lungs clear to auscultation bilaterally. No wheezing or crackles. No prolonged expiration. Symmetrical chest rise.  Musculoskeletal: No gross extremity deformities. No peripheral edema. Normal muscle bulk.  GI/Rectal: No evidence of fistula or hemorrhoids.  Slightly erythematous surrounding skin around the anus.  Soft, non-tender abdomen. No hepatosplenomegaly. Normal active bowel sounds.    Labs: " pending

## 2022-02-01 NOTE — LETTER
[facility] CARE COORDINATION  [address]  [place] [address]    February 1, 2022    Katy Islas  [address] [place] [address]      Dear Katy,    I am a clinic community health worker who works with Bacilio Adams MD at ***. {ccoutreach:[id number]} Below is a description of clinic care coordination and how I can further assist you.      The clinic care coordination team is made up of a registered nurse,  and community health worker who understand the health care system. The goal of clinic care coordination is to help you manage your health and improve access to the health care system in the most efficient manner. The team can assist you in meeting your health care goals by providing education, coordinating services, strengthening the communication among your providers and supporting you with any resource needs.    Please feel free to contact {[facility] CHW:[id number]} at *** with any questions or concerns. We are focused on providing you with the highest-quality healthcare experience possible and that all starts with you.     Sincerely,     ***    Enclosed: {ccenclosed:[id number]}

## 2022-02-01 NOTE — TELEPHONE ENCOUNTER
Pt calls and states that she is having pain in her left leg. She states that it feels like a marco antonio horse but it is constant. Started when she woke up this morning. Rates pain at 7/10. No swelling noted. No redness. Not hot to the touch. No shortness of breath or difficulty breathing. Pt took a warm bath and walked around and that helped with the pain but then the pain returned within a few minutes. Pt is able to walk on the leg. No known injury. No changes to activities yesterday that may have prompted this pain. Pt did try taking the flexeril, gabapentin, and Tylenol with codeine this morning and this didn't help. It is the whole leg from the buttocks down. It feels like pressure and it feels constantly tight. No numbness or tingling. Pt has been in [place] since the end of December, over a month. She does not have a history of clotting disorders. She takes Eliquis daily. She has a history of a blood clot in right arm.    Per protocol, advised that pt be seen. Appointment scheduled for tomorrow. Advised pt to go to ER with any worsening symptoms, difficulty breathing, swelling of the left leg, etc. Pt verbalizes understanding and is in agreement with plan.     Merlyn Lee, EDYTAN, RN, PHN  Registered Nurse-Clinic Triage  [facility]  2/1/2022 at 8:32 AM      Reason for Disposition    Patient wants to be seen    Additional Information    Negative: Looks like a broken bone or dislocated joint (e.g., crooked or deformed)    Negative: Sounds like a life-threatening emergency to the triager    Negative: Followed a hip injury    Negative: Followed a knee injury    Negative: Followed an ankle or foot injury    Negative: Back pain radiating (shooting) into leg(s)    Negative: Foot pain is the main symptom    Negative: Ankle pain is the main symptom    Negative: Knee pain is the main symptom    Negative: Leg swelling is the main symptom    Negative: Chest pain    Negative: Difficulty breathing     Negative: Entire foot is cool or blue in comparison to other side    Negative: Unable to walk    Negative: Fever and red area (or area very tender to touch)    Negative: Fever and swollen joint    Negative: Thigh or calf pain in only one leg and present > 1 hour    Negative: Thigh, calf, or ankle swelling in only one leg    Negative: Thigh, calf, or ankle swelling in both legs, but one side is definitely more swollen    Negative: History of prior 'blood clot' in leg or lungs (i.e., deep vein thrombosis, pulmonary embolism)    Negative: History of inherited increased risk of blood clots (e.g., factor 5 Leiden, antithrombin 3, protein C or protein S deficiency, prothrombin mutation)    Negative: Major surgery in the past month    Negative: Hip or leg fracture (broken bone) in past month (or had cast on leg or ankle in past month)    Negative: Illness requiring prolonged bedrest in past month (e.g., immobilization, long hospital stay)    Negative: Long-distance travel in past month (e.g., car, bus, train, plane; with trip lasting 6 or more hours)    Negative: Cancer treatment in the past two months (or has cancer now)    Negative: Patient sounds very sick or weak to the triager    Negative: SEVERE pain (e.g., excruciating, unable to do any normal activities)    Negative: Cast on leg or ankle and now has increasing pain    Negative: Red area or streak and large (> 2 in. or 5 cm)    Negative: Painful rash with multiple small blisters grouped together (i.e., dermatomal distribution or 'band' or 'stripe')    Negative: Looks like a boil, infected sore, deep ulcer, or other infected rash (spreading redness, pus)    Negative: Localized rash is very painful (no fever)    Negative: Numbness in a leg or foot (i.e., loss of sensation)    Negative: Localized pain, redness or hard lump along vein    Protocols used: LEG PAIN-A-OH       Name band;

## 2022-02-01 NOTE — PROGRESS NOTES
Clinic Care Coordination Contact  Community Health Worker Initial Outreach            After chart review the CHW will try to communicate with the patient through [employer] due to the patient needing an American Sign Language.     The CHW will will send a message asking the patient what help and/or resources she needs.     Deborah eSbastian  Community Health Worker   [facility]  Care Coordination  Amy Anton Rogers, [place], [facility]  [email or web address]   Office: [phone]

## 2022-02-01 NOTE — TELEPHONE ENCOUNTER
Patient calling stating that it is very painful to go to the bathroom. She states that she has been straining when she has a BM and it is painful.     Patient was seen in ED yesterday and was dx with genital herpes. The vaginal area identifies two lesions. Informed the patient that the lesions can cause discomfort. The patient feels like the discomfort is related to a UTI.     Valtrex   Laying down  Just finished antibiotics for sinus infection  Tylenol    Advised patient that she should go to Urgent Care to have her urine tested if she can not wait until her appointment tomorrow.     Jose Finney, EDYTAN, RN, PHN  [place]  February 1, 2022

## 2022-02-02 ENCOUNTER — OFFICE VISIT (OUTPATIENT)
Dept: FAMILY MEDICINE | Facility: CLINIC | Age: 32
End: 2022-02-02
Payer: MEDICARE

## 2022-02-02 VITALS
BODY MASS INDEX: 36.62 KG/M2 | OXYGEN SATURATION: 100 % | SYSTOLIC BLOOD PRESSURE: 122 MMHG | RESPIRATION RATE: 18 BRPM | HEART RATE: 108 BPM | TEMPERATURE: 97.4 F | DIASTOLIC BLOOD PRESSURE: 86 MMHG | WEIGHT: 199 LBS | HEIGHT: 62 IN

## 2022-02-02 DIAGNOSIS — M79.662 PAIN OF LEFT CALF: ICD-10-CM

## 2022-02-02 DIAGNOSIS — K62.89 ANAL PAIN: Primary | ICD-10-CM

## 2022-02-02 DIAGNOSIS — R10.2 PELVIC PAIN IN FEMALE: ICD-10-CM

## 2022-02-02 DIAGNOSIS — G89.29 CHRONIC BILATERAL LOW BACK PAIN WITH LEFT-SIDED SCIATICA: ICD-10-CM

## 2022-02-02 DIAGNOSIS — M54.42 CHRONIC BILATERAL LOW BACK PAIN WITH LEFT-SIDED SCIATICA: ICD-10-CM

## 2022-02-02 LAB
ALBUMIN UR-MCNC: NEGATIVE MG/DL
ANION GAP SERPL CALCULATED.3IONS-SCNC: 4 MMOL/L (ref 3–14)
APPEARANCE UR: ABNORMAL
BACTERIA #/AREA URNS HPF: ABNORMAL /HPF
BILIRUB UR QL STRIP: NEGATIVE
BUN SERPL-MCNC: 17 MG/DL (ref 7–30)
CALCIUM SERPL-MCNC: 9.5 MG/DL (ref 8.5–10.1)
CHLORIDE BLD-SCNC: 104 MMOL/L (ref 94–109)
CLUE CELLS: ABNORMAL
CO2 SERPL-SCNC: 28 MMOL/L (ref 20–32)
COLOR UR AUTO: YELLOW
CREAT SERPL-MCNC: 0.8 MG/DL (ref 0.52–1.04)
GFR SERPL CREATININE-BSD FRML MDRD: >90 ML/MIN/1.73M2
GLUCOSE BLD-MCNC: 92 MG/DL (ref 70–99)
GLUCOSE UR STRIP-MCNC: NEGATIVE MG/DL
HGB UR QL STRIP: NEGATIVE
KETONES UR STRIP-MCNC: NEGATIVE MG/DL
LEUKOCYTE ESTERASE UR QL STRIP: NEGATIVE
MAGNESIUM SERPL-MCNC: 1.7 MG/DL (ref 1.6–2.3)
NITRATE UR QL: NEGATIVE
PH UR STRIP: 7 [PH] (ref 5–7)
POTASSIUM BLD-SCNC: 4.5 MMOL/L (ref 3.4–5.3)
RBC #/AREA URNS AUTO: ABNORMAL /HPF
SODIUM SERPL-SCNC: 136 MMOL/L (ref 133–144)
SP GR UR STRIP: 1.01 (ref 1–1.03)
SQUAMOUS #/AREA URNS AUTO: ABNORMAL /LPF
TRICHOMONAS, WET PREP: ABNORMAL
UROBILINOGEN UR STRIP-ACNC: 0.2 E.U./DL
WBC #/AREA URNS AUTO: ABNORMAL /HPF
WBC'S/HIGH POWER FIELD, WET PREP: ABNORMAL
YEAST, WET PREP: ABNORMAL

## 2022-02-02 PROCEDURE — 36415 COLL VENOUS BLD VENIPUNCTURE: CPT | Performed by: FAMILY MEDICINE

## 2022-02-02 PROCEDURE — 87210 SMEAR WET MOUNT SALINE/INK: CPT | Performed by: FAMILY MEDICINE

## 2022-02-02 PROCEDURE — 83735 ASSAY OF MAGNESIUM: CPT | Performed by: FAMILY MEDICINE

## 2022-02-02 PROCEDURE — 81001 URINALYSIS AUTO W/SCOPE: CPT | Performed by: FAMILY MEDICINE

## 2022-02-02 PROCEDURE — 99214 OFFICE O/P EST MOD 30 MIN: CPT | Performed by: FAMILY MEDICINE

## 2022-02-02 PROCEDURE — 80048 BASIC METABOLIC PNL TOTAL CA: CPT | Performed by: FAMILY MEDICINE

## 2022-02-02 RX ORDER — CYCLOBENZAPRINE HCL 10 MG
10 TABLET ORAL 3 TIMES DAILY PRN
Qty: 90 TABLET | Refills: 0 | Status: SHIPPED | OUTPATIENT
Start: 2022-02-02

## 2022-02-02 RX ORDER — MENTHOL AND ZINC OXIDE .44; 20.625 G/100G; G/100G
OINTMENT TOPICAL 4 TIMES DAILY PRN
Qty: 113 G | Refills: 1 | Status: SHIPPED | OUTPATIENT
Start: 2022-02-02

## 2022-02-02 ASSESSMENT — PAIN SCALES - GENERAL: PAINLEVEL: EXTREME PAIN (8)

## 2022-02-02 ASSESSMENT — ENCOUNTER SYMPTOMS: TINGLING: 1

## 2022-02-02 ASSESSMENT — MIFFLIN-ST. JEOR: SCORE: 1565.91

## 2022-02-02 NOTE — RESULT ENCOUNTER NOTE
Please inform of results if patient has not viewed in "[employer].    Your wet prep and urine was normal. We can consider an ultrasound in the future, but you had a CT on 1/19/2022 that did not show any pelvic abnormalities at that time so I do not feel that would be high yield.    Your blood tests are pending.    Please call the clinic with any questions you may have.     Have a great day,    Dr. Ferrer

## 2022-02-03 ENCOUNTER — VIRTUAL VISIT (OUTPATIENT)
Dept: GASTROENTEROLOGY | Facility: CLINIC | Age: 32
End: 2022-02-03
Payer: MEDICARE

## 2022-02-03 DIAGNOSIS — K50.911 CROHN'S DISEASE WITH RECTAL BLEEDING, UNSPECIFIED GASTROINTESTINAL TRACT LOCATION (H): Primary | ICD-10-CM

## 2022-02-03 DIAGNOSIS — K21.9 GASTROESOPHAGEAL REFLUX DISEASE WITHOUT ESOPHAGITIS: ICD-10-CM

## 2022-02-03 DIAGNOSIS — K52.9 INFLAMMATORY BOWEL DISEASE: ICD-10-CM

## 2022-02-03 PROCEDURE — 99443 PR PHYSICIAN TELEPHONE EVALUATION 21-30 MIN: CPT | Mod: 95 | Performed by: PHYSICIAN ASSISTANT

## 2022-02-03 RX ORDER — MESALAMINE 1.2 G/1
3600 TABLET, DELAYED RELEASE ORAL
Qty: 90 TABLET | Refills: 5 | Status: SHIPPED | OUTPATIENT
Start: 2022-02-03

## 2022-02-03 RX ORDER — MESALAMINE 4 G/60ML
4 SUSPENSION RECTAL AT BEDTIME
Qty: 30 ENEMA | Refills: 1 | Status: SHIPPED | OUTPATIENT
Start: 2022-02-03

## 2022-02-03 NOTE — RESULT ENCOUNTER NOTE
Please inform of results if patient has not viewed in [employer]    Your potassium and magnesium lab results came back normal. Your kidney function was normal. I would do the stretches we discussed.     Please call the clinic with any questions you may have.     Have a great day,    Dr. Ferrer

## 2022-02-03 NOTE — PROGRESS NOTES
Katy is a 31 year old who is being evaluated via a billable telephone visit.      What phone number would you like to be contacted at? [phone]  How would you like to obtain your AVS? Tashiasilvestre   Patient is in [place] for today's appointment.     Patient does have the technology to do video visits in the future- unable to change appointment today due to inability to provide  for video visit with short notice.

## 2022-02-03 NOTE — PATIENT INSTRUCTIONS
It was a pleasure visiting with you today.     We will start you on lialda and enemas for your inflammatory bowel disease. These prescriptions have been sent in to your pharmacy.     We should complete the MRI's that were previously ordered. We will look into if these tests can be completed with your spinal stimulator. If not, then we can consider dedicated CT of the small intestines (CT enterography) and CT Pelvis (to check prior fistulas).     Please be sure to establish care with a GI specialist upon your return to [place].     Junaid Pritchett PA-C  Gastroenterology  [facility]

## 2022-02-03 NOTE — PROGRESS NOTES
GASTROENTEROLOGY FOLLOW UP TELEPHONE VISIT    CC/REFERRING MD:    Bacilio Adams        REASON FOR VISIT: Telephone visit (colonoscopy follow up)      HISTORY OF PRESENT ILLNESS:    Katy Islas is 31 year old female who presents for follow up.     Visit completed with  services.    I last visited with Katy in May 2021 via virtual visit. See previous notes for more detail. Briefly she was reporting rectal bleeding, diarrhea and abdominal pain. She actually has had these symptoms off and on for several years. She reported a history of crohn's disease since age 10 or 11 and was previously medications including 6MP, prednisone, remicade and occ abx (cipro and flagyl). She however has not been on medications for a number of years. Her last several colonoscopies have been unrevealing. She was evaluated with colonoscopy in 2018 and 2019 for similar symptoms without concern for IBD. She did have internal hemorrhoids which were thought to be the source of her rectal bleeding at that time.     She did however have a perianal abscess and anal fistula in March and April of 2020 which was treated by colorectal. We advised for repeat colonoscopy, MRE and MR pelvis at the time of our visit in May 2021. She has not completed either MRI and reports having a spinal stimulator. She unfortunately was not also not able to complete the colonoscopy until last month which noted erythematous mucosa in the rectum with oozing. Biopsies were consistent with mild chronic active colitis in the right colon and in the rectum.    She tells me that she no longer lives in [place] so I therefore sent in a prescription of budesonide and mesalamine suppository and advised her to set up with GI in her new state. She however is now reporting that she is stuck in [place] for a while longer due to car troubles. She also tells me that neither prescription was covered well so she has not been able to start medications.     She continues  have abdominal pain, diarrhea and rectal pain at this time. She did have a CT abd/pelvis about 2 weeks ago for these persistent symptoms which was overall unrevealing without acute pathology.     She does continue to struggle with reflux. She is taking protonix twice daily but not on an empty stomach. She also takes sucralfate as needed which is sometimes helpful. She did have an EGD in August 2021 at outside facility which was normal. She doesn't eat late at night. Usually has a few hours between dinner and bedtime however notes worsening reflux at bedtime. occ wakes up in the middle of the night with symptoms. She also has a digestive supplement otc that includes peppermint oil which has been helpful.       She does plan to go back to [place] when her car gets repaired.        PMHx, PSHx, Social Hx, Family Hx have been reviewed.     Katy  has a past medical history of Abdominal pain (10/31- 11/4/2005), Acute ulcerative colitis (H), Allergic rhinitis, cause unspecified, Arthritis, C. 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    She  has a past surgical history that includes UPPER GI ENDOSCOPY,EXAM (7/00, 10/00); EEG SLEEP DEPRIVED; COLONOSCOPY THRU STOMA, DIAGNOSTIC (10/00); MRI BRAIN W/O CONTRAST (12/00); COLONOSCOPY THRU STOMA WITH BIOPSY (10/29/2003); UPPER GI ENDOSCOPY,EXAM  (01/20/2005); colonoscopy (7/1/2009); UPPER GI ENDOSCOPY,EXAM (7/1/2009); REMOVAL GALLBLADDER (8/5/2009); COLONOSCOPY THRU STOMA, DIAGNOSTIC (Oct 2009); UPPER GI ENDOSCOPY,EXAM (11/11/2009); UGI ENDOSCOPY DIAG W BIOPSY (11/11/09); orthopedic surgery (October 19,2011); Fusion spine posterior minimally invasive one level (N/A, 2/23/2017); FUSION OF SACROILIAC JOINT (10/26/2018); REMOVAL OF COCCYX (10/18/2017); Combined Esophagoscopy, Gastroscopy, Duodenoscopy (Egd) With Co2 Insufflation (N/A, 4/12/2019); Esophagoscopy, gastroscopy, duodenoscopy (EGD), combined (N/A, 4/12/2019); Colonoscopy with CO2 insufflation (N/A, 7/17/2019); Colonoscopy (N/A, 7/17/2019); Fistulotomy rectum (N/A, 4/30/2020); and Colonoscopy (N/A, 1/11/2022).    She  reports that she has quit smoking. Her smoking use included cigarettes. She has a 1.25 pack-year smoking history. She has never used smokeless tobacco. She reports previous alcohol use. She reports previous drug use. Drug: Marijuana.    Her family history includes Breast Cancer in her maternal grandmother; Cancer in her maternal grandfather and paternal uncle; Cardiovascular in her paternal grandfather; Cerebrovascular Disease in her paternal grandmother; Colon Cancer in her maternal uncle; Depression in her brother; Diabetes in her father; Gastrointestinal Disease in her brother; Genitourinary Problems in her father; Heart Disease in her father; Lung Cancer in her maternal uncle and paternal uncle; Neurologic Disorder in her brother; Parkinsonism in her maternal grandmother; Substance Abuse in her brother and mother.        PREVIOUS ENDOSCOPY:    COLONOSCOPY 1/11/2022  Impression:    - Preparation of the colon was poor.                          - The examined portion of the ileum was normal.                          - Normal mucosa in the sigmoid colon, in the descending colon, in the transverse colon, in the                          ascending colon, in the cecum and at the ileocecal  valve. Biopsied.                          - Erythematous mucosa in the rectum with oozing. Biopsied.     A. RIGHT COLON, BIOPSY:  - Mild chronic active colitis with focal cryptitis  - Negative for dysplasia      B. LEFT COLON, BIOPSY:  - Colonic mucosa with no significant inflammation  - Negative for dysplasia     C. RECTUM, BIOPSY:  - Mild chronic active colitis and focal mucosal erosion  - Negative for dysplasia        Colonoscopy 07/2019  Impression:      - The examined portion of the ileum was normal.                             - No signs of IBD. Area of abnormality on the CT scan is normal endoscopically.                             - The transverse colon, ascending colon and cecum are normal.                             - One 4 mm polyp in the descending colon, removed with a cold snare. Resected and retrieved.                             - The rectum and sigmoid colon are normal.                             - Non-bleeding internal hemorrhoids. This may be the source of her rectal bleeding.                             - Anal papilla(e) were hypertrophied.     For improved result formatting, select 'View Enhanced Report Format' under    Linked Documents section.     SPECIMEN(S):   A: Random colon biopsy   B: Descending colon biopsy     FINAL DIAGNOSIS:   A. RANDOM COLON BIOPSY:   - Colonic mucosa with no significant histologic abnormality   - Negative for active inflammation and lymphocytic colitis     B. DESCENDING COLON POLYP, POLYPECTOMY:   - Hyperplastic polyp, negative for dysplasia     I have personally reviewed all specimens and/or slides, including the listed special stains, and used them   with my medical judgement to determine or confirm the final diagnosis.     Electronically signed out by:     Victorino Bajwa M.D., [facility]         PERTINENT STUDIES: (I personally reviewed these laboratory studies today)  Most recent CBC:   Recent Labs   Lab Test 01/19/22  1311 01/17/22  2301   WBC 5.2 5.3    HGB 13.4 12.9   HCT 38.6 37.8    270     Most recent hepatic panel:  Recent Labs   Lab Test 01/19/22  1311 01/17/22  2301   ALT 26 27   AST 7 14     Most recent creatinine:  Recent Labs   Lab Test 02/02/22  1123 01/19/22  1311   CR 0.80 0.73       TSH   Date Value Ref Range Status   01/03/2022 0.44 0.40 - 4.00 mU/L Final   05/14/2021 0.90 0.40 - 4.00 mU/L Final         RADIOLOGY:      CT ABDOMEN PELVIS WITHOUT CONTRAST 1/19/2022 1:39 PM     CLINICAL HISTORY: Abdominal pain. Nausea and vomiting.  TECHNIQUE: CT scan of the abdomen and pelvis was performed without IV  contrast. Multiplanar reformats were obtained. Dose reduction  techniques were used.  CONTRAST: None.  COMPARISON: CT of the abdomen and pelvis performed 9/5/2021.     FINDINGS:   LOWER CHEST: Consolidation at the left lung base posteriorly has  improved since the previous exam. The visualized lung bases are  otherwise clear.     HEPATOBILIARY: Cholecystectomy. No hepatic masses are seen.     PANCREAS: Normal.     SPLEEN: Normal.     ADRENAL GLANDS: Normal.     KIDNEYS/BLADDER: A few small nonobstructing stones in the right kidney  measures 0.2 cm or smaller. A small hemorrhagic cyst in the lower pole  of the right kidney measures 1.2 cm. The kidneys are otherwise  unremarkable. No ureteral calculi or hydronephrosis.     BOWEL: No bowel obstruction. No convincing evidence for colitis or  diverticulitis. Unremarkable appendix.     PELVIC ORGANS: Unremarkable.     LYMPH NODES: No enlarged lymph nodes are identified in the abdomen or  pelvis.     VASCULATURE: Unremarkable.     ADDITIONAL FINDINGS: None.     MUSCULOSKELETAL: Postoperative changes of posterior marilyn and pedicle  screw fusion at L4-5. Two surgical screws are also noted to cross the  left sacroiliac joint. Spinal stimulator device is noted in the  subcutaneous fat of the right flank region, with lead tips at the  level of T7-8.                                                                       IMPRESSION:   1.  No acute abnormality in the abdomen or pelvis. No cause for  abdominal pain is identified.  2.  Consolidation at the left lung base posteriorly has improved since  the previous exam. A persistent or recurrent pneumonia cannot be  excluded. Please clinically correlate.  3.  Few small nonobstructing stones in the right kidney measure 0.2 cm  or smaller.       MARCELINA HALL MD            Physical Exam   healthy, alert and no distress  PSYCH: Alert and oriented times 3;  her affect is normal  RESP: No cough, no audible wheezing, able to talk in full sentences  Remainder of exam unable to be completed due to telephone visits        ASSESSMENT/PLAN:    1. Crohn's disease with rectal bleeding, unspecified gastrointestinal tract location (H)  2. Gastroesophageal reflux disease without esophagitis      Katy Ilsas is a 31 year old female who presents for follow up.     Concerns for crohn's disease on recent colonoscopy and ongoing symptoms of abdominal pain, diarrhea, rectal pain/bleeding. She did report previous history of crohn's disease that was diagnosed at age 10 or 11 years old. She was managed in the past with medications including 6mp and remicade however she was off of medications for a number of years. She did have several colonoscopies in the last several years without any evidence of IBD. Unfortunately she did have perianal abscess and anal fistula last year. We recommended repeating colonoscopy at that time however she lost follow up until recently. She had this colonoscopy updated last month which did show concerns for mild chronic active colitis in the right colon and rectum. I did prescribe budesonide and mesalamine enema after results were obtained however she tells me today that she has not been able to start these medicines due to cost. She also reported that she would be going back to [place] where she needs to establish with GI however has not been able to travel due to  car troubles.     Reviewed IBD diagnosis and management. Recommended pursuing MRE that was previously ordered. She does have a spinal stimulator, we will look into if she is still capable of having MRI done. If not, alternative would be CT enterography or VCE.     Will go ahead and start lialda mesalamine and mesalamine enemas for her colonoscopy findings.     In regards to GERD, advised to continue protonix twice daily. Continue sucralfate as needed. Can trial gaviscon prn. Agree with continuing peppermint oil supplement since this seems to be helping too. Also reviewed behavioral changes that might help such as sleeping with head of bed elevated and or a wedge pillow.     Reviewed importance of establishing with GI once she returns to [place].     Thank you for this consultation.  It was a pleasure to participate in the care of this patient; please contact us with any further questions.      60 minutes spent on the date of the encounter doing chart review, history and exam, documentation and further activities per the note      This note was created with voice recognition software, and while reviewed for accuracy, typos may remain.       Junaid Pritchett PA-C  Gastroenterology  [facility]     Phone call duration: 22 minutes

## 2022-02-04 ENCOUNTER — TELEPHONE (OUTPATIENT)
Dept: FAMILY MEDICINE | Facility: CLINIC | Age: 32
End: 2022-02-04

## 2022-02-04 ENCOUNTER — MYC MEDICAL ADVICE (OUTPATIENT)
Dept: FAMILY MEDICINE | Facility: OTHER | Age: 32
End: 2022-02-04

## 2022-02-04 ENCOUNTER — NURSE TRIAGE (OUTPATIENT)
Dept: FAMILY MEDICINE | Facility: CLINIC | Age: 32
End: 2022-02-04

## 2022-02-04 ENCOUNTER — OFFICE VISIT (OUTPATIENT)
Dept: INTERPRETER SERVICES | Facility: CLINIC | Age: 32
End: 2022-02-04
Payer: MEDICARE

## 2022-02-04 ENCOUNTER — HOSPITAL ENCOUNTER (EMERGENCY)
Facility: CLINIC | Age: 32
Discharge: HOME | End: 2022-02-04
Attending: EMERGENCY MEDICINE | Admitting: EMERGENCY MEDICINE
Payer: MEDICARE

## 2022-02-04 ENCOUNTER — APPOINTMENT (OUTPATIENT)
Dept: GENERAL RADIOLOGY | Facility: CLINIC | Age: 32
End: 2022-02-04
Attending: EMERGENCY MEDICINE
Payer: MEDICARE

## 2022-02-04 ENCOUNTER — TELEPHONE (OUTPATIENT)
Dept: GASTROENTEROLOGY | Facility: CLINIC | Age: 32
End: 2022-02-04

## 2022-02-04 VITALS
BODY MASS INDEX: 37.36 KG/M2 | SYSTOLIC BLOOD PRESSURE: 122 MMHG | TEMPERATURE: 97.7 F | OXYGEN SATURATION: 94 % | DIASTOLIC BLOOD PRESSURE: 97 MMHG | RESPIRATION RATE: 16 BRPM | HEIGHT: 62 IN | HEART RATE: 112 BPM | WEIGHT: 203.03 LBS

## 2022-02-04 DIAGNOSIS — M25.522 LEFT ELBOW PAIN: ICD-10-CM

## 2022-02-04 DIAGNOSIS — I82.611 ACUTE CEPHALIC VEIN THROMBOSIS, RIGHT: Primary | ICD-10-CM

## 2022-02-04 LAB
ANION GAP SERPL CALCULATED.3IONS-SCNC: 5 MMOL/L (ref 3–14)
BASOPHILS # BLD AUTO: 0 10E3/UL (ref 0–0.2)
BASOPHILS NFR BLD AUTO: 0 %
BUN SERPL-MCNC: 12 MG/DL (ref 7–30)
CALCIUM SERPL-MCNC: 9.3 MG/DL (ref 8.5–10.1)
CHLORIDE BLD-SCNC: 104 MMOL/L (ref 94–109)
CO2 SERPL-SCNC: 29 MMOL/L (ref 20–32)
CREAT SERPL-MCNC: 0.83 MG/DL (ref 0.52–1.04)
CRP SERPL-MCNC: <2.9 MG/L (ref 0–8)
D DIMER PPP FEU-MCNC: 0.41 UG/ML FEU (ref 0–0.5)
EOSINOPHIL # BLD AUTO: 0.1 10E3/UL (ref 0–0.7)
EOSINOPHIL NFR BLD AUTO: 1 %
ERYTHROCYTE [DISTWIDTH] IN BLOOD BY AUTOMATED COUNT: 12.6 % (ref 10–15)
ERYTHROCYTE [SEDIMENTATION RATE] IN BLOOD BY WESTERGREN METHOD: 6 MM/HR (ref 0–20)
GFR SERPL CREATININE-BSD FRML MDRD: >90 ML/MIN/1.73M2
GLUCOSE BLD-MCNC: 72 MG/DL (ref 70–99)
HCT VFR BLD AUTO: 42.3 % (ref 35–47)
HGB BLD-MCNC: 14.6 G/DL (ref 11.7–15.7)
HOLD SPECIMEN: NORMAL
IMM GRANULOCYTES # BLD: 0 10E3/UL
IMM GRANULOCYTES NFR BLD: 1 %
LYMPHOCYTES # BLD AUTO: 2 10E3/UL (ref 0.8–5.3)
LYMPHOCYTES NFR BLD AUTO: 41 %
MCH RBC QN AUTO: 33 PG (ref 26.5–33)
MCHC RBC AUTO-ENTMCNC: 34.5 G/DL (ref 31.5–36.5)
MCV RBC AUTO: 96 FL (ref 78–100)
MONOCYTES # BLD AUTO: 0.6 10E3/UL (ref 0–1.3)
MONOCYTES NFR BLD AUTO: 13 %
NEUTROPHILS # BLD AUTO: 2.1 10E3/UL (ref 1.6–8.3)
NEUTROPHILS NFR BLD AUTO: 44 %
NRBC # BLD AUTO: 0 10E3/UL
NRBC BLD AUTO-RTO: 0 /100
NT-PROBNP SERPL-MCNC: 20 PG/ML (ref 0–450)
PLATELET # BLD AUTO: 270 10E3/UL (ref 150–450)
POTASSIUM BLD-SCNC: 4.3 MMOL/L (ref 3.4–5.3)
RBC # BLD AUTO: 4.43 10E6/UL (ref 3.8–5.2)
SODIUM SERPL-SCNC: 138 MMOL/L (ref 133–144)
TROPONIN I SERPL HS-MCNC: <3 NG/L
WBC # BLD AUTO: 4.8 10E3/UL (ref 4–11)

## 2022-02-04 PROCEDURE — 99285 EMERGENCY DEPT VISIT HI MDM: CPT | Mod: 25 | Performed by: EMERGENCY MEDICINE

## 2022-02-04 PROCEDURE — 99285 EMERGENCY DEPT VISIT HI MDM: CPT | Performed by: EMERGENCY MEDICINE

## 2022-02-04 PROCEDURE — 250N000013 HC RX MED GY IP 250 OP 250 PS 637: Performed by: EMERGENCY MEDICINE

## 2022-02-04 PROCEDURE — 80048 BASIC METABOLIC PNL TOTAL CA: CPT | Performed by: EMERGENCY MEDICINE

## 2022-02-04 PROCEDURE — 250N000011 HC RX IP 250 OP 636: Performed by: EMERGENCY MEDICINE

## 2022-02-04 PROCEDURE — 36415 COLL VENOUS BLD VENIPUNCTURE: CPT | Performed by: EMERGENCY MEDICINE

## 2022-02-04 PROCEDURE — 85652 RBC SED RATE AUTOMATED: CPT | Performed by: EMERGENCY MEDICINE

## 2022-02-04 PROCEDURE — 96376 TX/PRO/DX INJ SAME DRUG ADON: CPT | Performed by: EMERGENCY MEDICINE

## 2022-02-04 PROCEDURE — 85014 HEMATOCRIT: CPT | Performed by: EMERGENCY MEDICINE

## 2022-02-04 PROCEDURE — 96374 THER/PROPH/DIAG INJ IV PUSH: CPT | Performed by: EMERGENCY MEDICINE

## 2022-02-04 PROCEDURE — T1013 SIGN LANG/ORAL INTERPRETER: HCPCS | Mod: U3

## 2022-02-04 PROCEDURE — 85379 FIBRIN DEGRADATION QUANT: CPT | Performed by: EMERGENCY MEDICINE

## 2022-02-04 PROCEDURE — 86140 C-REACTIVE PROTEIN: CPT | Performed by: EMERGENCY MEDICINE

## 2022-02-04 PROCEDURE — 83880 ASSAY OF NATRIURETIC PEPTIDE: CPT | Mod: GZ | Performed by: EMERGENCY MEDICINE

## 2022-02-04 PROCEDURE — 84484 ASSAY OF TROPONIN QUANT: CPT | Performed by: EMERGENCY MEDICINE

## 2022-02-04 PROCEDURE — 73070 X-RAY EXAM OF ELBOW: CPT | Mod: LT

## 2022-02-04 PROCEDURE — 93005 ELECTROCARDIOGRAM TRACING: CPT | Performed by: EMERGENCY MEDICINE

## 2022-02-04 RX ORDER — HYDROMORPHONE HCL IN WATER/PF 6 MG/30 ML
0.5 PATIENT CONTROLLED ANALGESIA SYRINGE INTRAVENOUS
Status: DISCONTINUED | OUTPATIENT
Start: 2022-02-04 | End: 2022-02-04 | Stop reason: HOSPADM

## 2022-02-04 RX ORDER — DIPHENHYDRAMINE HCL 50 MG
50 CAPSULE ORAL ONCE
Status: COMPLETED | OUTPATIENT
Start: 2022-02-04 | End: 2022-02-04

## 2022-02-04 RX ORDER — OXYCODONE HYDROCHLORIDE 5 MG/1
5 TABLET ORAL EVERY 6 HOURS PRN
Qty: 12 TABLET | Refills: 0 | Status: SHIPPED | OUTPATIENT
Start: 2022-02-04 | End: 2022-02-09

## 2022-02-04 RX ORDER — ONDANSETRON 4 MG/1
4 TABLET, ORALLY DISINTEGRATING ORAL ONCE
Status: COMPLETED | OUTPATIENT
Start: 2022-02-04 | End: 2022-02-04

## 2022-02-04 RX ADMIN — HYDROMORPHONE HYDROCHLORIDE 0.5 MG: 0.2 INJECTION, SOLUTION INTRAMUSCULAR; INTRAVENOUS; SUBCUTANEOUS at 15:36

## 2022-02-04 RX ADMIN — DIPHENHYDRAMINE HYDROCHLORIDE 50 MG: 50 CAPSULE ORAL at 16:28

## 2022-02-04 RX ADMIN — ONDANSETRON 4 MG: 4 TABLET, ORALLY DISINTEGRATING ORAL at 14:47

## 2022-02-04 RX ADMIN — HYDROMORPHONE HYDROCHLORIDE 0.5 MG: 0.2 INJECTION, SOLUTION INTRAMUSCULAR; INTRAVENOUS; SUBCUTANEOUS at 14:25

## 2022-02-04 ASSESSMENT — ENCOUNTER SYMPTOMS
MYALGIAS: 1
PALPITATIONS: 1
CHEST TIGHTNESS: 1

## 2022-02-04 ASSESSMENT — MIFFLIN-ST. JEOR: SCORE: 1584.19

## 2022-02-04 NOTE — TELEPHONE ENCOUNTER
Refilled eliquis based on last hematology note (care everywhere) recommending this go to March 2022. Have her schedule follow-up visit for this with them.    Have patient schedule visit for elbow pain.    Bacilio Adams MD  [facility]

## 2022-02-04 NOTE — ED NOTES
Called lab and asked them to run ESR on purple top tube that they already have.  Lab staff reported they would.

## 2022-02-04 NOTE — TELEPHONE ENCOUNTER
Patient states she needs a refill on her Eliquis.    This nurse explained to patient that this medication has not been filled by her provider. Patient states that Hematology fills it.     This nurse advised patient request this refill from Hematology and she states they will not fill it because they want her to have an appointment. Advised patient to schedule a patient to have this done.     Patient is requesting PCP fill this for a short refill until she can get into specialty.     Routing to PCP to advise.    MAUREEN Jacques/[facility]  February 4, 2022

## 2022-02-04 NOTE — TELEPHONE ENCOUNTER
"Patient calling in regards to medication sent to pharmacy.     Patient states \"I am really pissed off at my pharmacy.\"    Patient tried to  prescribed medication and it was so expensive.    mesalamine (LIALDA) 1.2 g EC tablet  ARIPiprazole (ABILIFY) 30 MG tablet    The deductible is very high on both of these medications.     Patient states \"I make $600 on social security and cannot afford this kind of money.\"    States she needs this medication for her anxiety.    Called pharmacy and they state    Abilify was covered by insurance and co-pay is $273.53/month     Mesalamine was covered by insurance and co-pay is $175.54/month    Sent [employer] message to patient explaining this to her and advised to call her insurance asking about price increase and if there are any alternatives that are lower in cost.     Routing to provider for further recommendations.     MAUREEN Jacques/[place] [facility]  February 4, 2022                  "

## 2022-02-04 NOTE — TELEPHONE ENCOUNTER
Yes she will need to discuss with her insurance.     Let her know that if she needed, she could get abilify with a [employer] coupon at [facility], [facility], [facility], or [facility] for around $20 or less out of pocket and I could send this to one of those pharmacies until she has this figured out.    Unfortunately the mesalamine is around the same price with these coupons.    Bacilio Adams MD  [facility]

## 2022-02-04 NOTE — TELEPHONE ENCOUNTER
Received call from GLENYS Urbina with [facility] Imaging department.  Patient is scheduled for CT scan and has a contrast allergy that requires premedication.  Upon further review of chart, Carlitos noted dexamethasone allergy as well, which may explain why steroids are not given as premed and patient receives only Benadryl prior to contrast.  Carlitos will return call if the provider needs to be reached in regards to this patient.    Telma Hansen RN

## 2022-02-04 NOTE — DISCHARGE INSTRUCTIONS
Your xray and labs were normal.  Please make an appointment to follow up with Your Primary Care Provider as soon as possible if your elbow pain is not improving.

## 2022-02-04 NOTE — PROGRESS NOTES
Last hematology note recommended eliquis 2.5 mg bid through March 2022. This has been ordered for patient. Please let her know and have her follow-up with her hematologist.    Bacilio Adams MD  [facility]

## 2022-02-04 NOTE — TELEPHONE ENCOUNTER
"Patient calling in regards to elbow pain that started a few days ago.    Red, sore and unable to straighten it out or even sign. Appears \"like psoriasis.\" Dry skin, redness, and swelling. \"Feels like fluid is in there.\"    Left elbow. Cramping in the left arm as well. Feels like a marco antonio horse. \"Really hurts to straighten the elbow.\" This has started to worsen.     Elbow is very warm to the touch and swollen. \"I feel a pop when I raise the arm up and it hurts so bad.\"    Pain is 6/10.    Also having chest discomfort but states \"I was in ED and they said nothing was wrong with me.\"    No open sores. No red streaking. Temp is 37.5 c    \"Hurts from the neck down to the hand.\" No known injury.    Also having a headache. Green mucous still coming up.     GO TO OFFICE NOW:  * You need to be examined. Come into the office right now.   * IF NO AVAILABLE APPOINTMENTS:  You need to be seen in the Urgent Care Center. Go to the one at . Leave now. A nearby Urgent Care Center is often a good source of care. Another choice is to go to the ER.    Advised patient be seen in ED or UC due to no availability in clinic. Patient states she will go to  to be evaluated.     Catalina Jimenes RN  [place] [facility]  February 4, 2022        Reason for Disposition    SEVERE pain (e.g., excruciating, unable to do any normal activities)    Additional Information    Negative: Shock suspected (e.g., cold/pale/clammy skin, too weak to stand, low BP, rapid pulse)    Negative: Similar pain previously and it was from 'heart attack'    Negative: Similar pain previously from 'angina' and not relieved by nitroglycerin    Negative: Sounds like a life-threatening emergency to the triager    Negative: Followed an injury to arm    Negative: Chest pain    Negative: Wound looks infected    Negative: Elbow pain is the main symptom    Negative: Hand or wrist pain is the main symptom    Negative: Difficulty breathing or unusual sweating " (e.g., sweating without exertion)    Negative: Chest pain lasting longer than 5 minutes    Negative: Age > 40 and no obvious cause for pain, pain still present even when not moving the arm    Negative: Fever and red area (or area very tender to touch)    Negative: Fever and swollen joint    Negative: Entire arm is swollen    Negative: Patient sounds very sick or weak to the triager    Protocols used: ARM PAIN-A-OH

## 2022-02-04 NOTE — ED PROVIDER NOTES
"    [facility] EMERGENCY DEPARTMENT ([facility])  2/04/22  History     Chief Complaint   Patient presents with     Chest Pain     History of blood clotsLeft sided chest pain that rdiates inot left neck and down left arm.     The history is provided by the patient.     Katy Islas is a 31 year old female with a complex past medical history, including hearing loss, Crohn's disease, depression, anxiety, chronic pain, bipolar, mild asthma, chronic back pain, post lumbar fusion, multiple orthopedic pain complaints, concern for drug-seeking behavior and borderline personality who presents to the Emergency Department with c/o left-sided elbow pain, radiating to chest and neck.  She reports experiencing left elbow pain for \"the last few days\", severity of pain intensified since yesterday.  She reports that she can slowly lift her shoulder without pain, she reports that her shoulder feels \"okay\" but her whole arm is in pain.  She states that she is unable to straighten her left arm.  Reports being out of her Eliquis medication, she was prescribed this after the DVT was discovered in her left arm in October 2021.  She reports that the pain she is experiencing today is very similar in nature to previous DVT.  She reports experiencing palpitations, she denies experiencing fever or chills.  Reports that her heart rate is at 90 at baseline.  She reports experiencing chronic abdominal pain due to Crohn's.      Past Medical History  Past Medical History:   Diagnosis Date     Abdominal pain 10/31- 11/4/2005    [facility] admit for Crohn's     Acute ulcerative colitis (H)      Allergic rhinitis, cause unspecified     Allergic rhinitis     Arthritis      C. difficile diarrhea     Past, no current diarrhea.     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, without mention of intractable epilepsy     pseudoseizures diagnosed after extensive neurologic eval     Migraine 07/21/12    D/C 07/22/12-[facility]     Migraine, unspecified, without mention of intractable migraine without mention of status migrainosus     Migraine     Mild intermittent asthma     mild intermittent     Mycoplasma infection in conditions classified elsewhere and of unspecified site      Other chronic pain     Back pain for 6 years     Renal disease     Kidney stones     Unspecified hearing loss     congenital hearing loss     Past Surgical History:   Procedure Laterality Date     AS REMOVAL OF COCCYX  10/18/2017     COLONOSCOPY  7/1/2009    [facility], [facility].     COLONOSCOPY N/A 7/17/2019    Procedure: Colonoscopy, With Polypectomy And Biopsy;  Surgeon: Edwin Conde MD;  Location:  OR     COLONOSCOPY N/A 1/11/2022    Procedure: COLONOSCOPY, WITH POLYPECTOMY AND BIOPSY;  Surgeon: Bob Ugarte DO;  Location: [facility]     COLONOSCOPY WITH CO2 INSUFFLATION N/A 7/17/2019    Procedure: COLONOSCOPY, WITH CO2 INSUFFLATION;  Surgeon: Edwin Conde MD;  Location:  OR     COMBINED ESOPHAGOSCOPY, GASTROSCOPY, DUODENOSCOPY (EGD) WITH CO2 INSUFFLATION N/A 4/12/2019    Procedure: COMBINED ESOPHAGOSCOPY, GASTROSCOPY, DUODENOSCOPY (EGD) WITH CO2 INSUFFLATION;  Surgeon: Edwin Conde MD;  Location:  OR     ESOPHAGOSCOPY, GASTROSCOPY, DUODENOSCOPY (EGD), COMBINED N/A 4/12/2019    Procedure: Combined Esophagoscopy, Gastroscopy, Duodenoscopy (Egd), Biopsy Single Or Multiple;  Surgeon: Edwin Conde MD;  Location:  OR      FISTULOTOMY RECTUM N/A 4/30/2020    Procedure: EXAM UNDER ANESTHESIA, ANUS, parital fistulotomy;  Surgeon: Valentin Sanchez MD;  Location: [facility]     FUSION SPINE POSTERIOR MINIMALLY INVASIVE ONE LEVEL N/A 2/23/2017    Procedure: FUSION SPINE POSTERIOR MINIMALLY INVASIVE ONE LEVEL;  L4-5 Oblique Lateral Lumbar Interbody Fusion   Epidural steroid injection.   Transpedicular Bone marrow aspiration;  Surgeon: Jeniffer Eugene MD;  Location: [facility] OR     HC EEG AWAKE AND SLEEP      abnormal     HC MRI BRAIN W/O CONTRAST  12/00    normal     HC REMOVAL GALLBLADDER  8/5/2009    [facility], [facility].     HC UGI ENDOSCOPY DIAG W BIOPSY  11/11/09    Normal esophagus     ORTHOPEDIC SURGERY  October 19,2011    diskectomy L4-L5     ZZC FUSION OF SACROILIAC JOINT  10/26/2018     [facility]SY  10/29/2003    Impression is that of normal appearing colonoscopy, without evidence of rectal bleeding.     [facility] COLONOSCOPY THRU STOMA, DIAGNOSTIC  10/00    normal     [facility] COLONOSCOPY THRU STOMA, DIAGNOSTIC  Oct 2009    Dr López- normal     [facility] UGI ENDOSCOPY, SIMPLE EXAM  7/00, 10/00    mild chronic esophagitis and duodenitis, neg H pylori     [facility] UGI ENDOSCOPY, SIMPLE EXAM  01/20/2005    Esophagogastroduodenoscopy, colonoscopy with biopsies.  [facility] UGI ENDOSCOPY, SIMPLE EXAM  7/1/2009    [facility], [facility].     [facility] UGI ENDOSCOPY, SIMPLE EXAM  11/11/2009    attempted upper GI, pt. could not tolerate procedure:[facility]     acetaminophen-codeine (TYLENOL #3) 300-30 MG tablet  albuterol (PROAIR HFA/PROVENTIL HFA/VENTOLIN HFA) 108 (90 Base) MCG/ACT inhaler  amoxicillin-clavulanate (AUGMENTIN) 875-125 MG tablet  apixaban ANTICOAGULANT (ELIQUIS) 2.5 MG tablet  ARIPiprazole (ABILIFY) 30 MG tablet  azelastine (OPTIVAR) 0.05 % ophthalmic solution  cyclobenzaprine (FLEXERIL) 10 MG tablet  dicyclomine (BENTYL) 20 MG tablet  DULoxetine (CYMBALTA) 30  MG capsule  EPINEPHrine (ANY BX GENERIC EQUIV) 0.3 MG/0.3ML injection 2-pack  gabapentin (NEURONTIN) 300 MG capsule  lidocaine, viscous, (XYLOCAINE) 2 % solution  menthol-zinc oxide (CALMOSEPTINE) 0.44-20.625 % OINT ointment  mesalamine (LIALDA) 1.2 g EC tablet  mesalamine (ROWASA) 4 g enema  NIFEdipine ER OSMOTIC (PROCARDIA XL) 60 MG 24 hr tablet  ondansetron (ZOFRAN) 4 MG tablet  pantoprazole (PROTONIX) 40 MG EC tablet  polyethylene glycol (MIRALAX) 17 g packet  polyethylene glycol (MIRALAX) 17 GM/Dose powder  rizatriptan (MAXALT-MLT) 5 MG ODT  sucralfate (CARAFATE) 1 GM tablet  valACYclovir (VALTREX) 1000 mg tablet      Allergies   Allergen Reactions     Dexamethasone Unknown     Iohexol Hives     Other reaction(s): Hives  IV contrast; patient states she tolerates contrast if she gets benadryl before  IV contrast; patient states she tolerates contrast if she gets benadryl before     Nsaids Other (See Comments) and Hives     GI BLEED x2  Tolerated toradol     Other Environmental Allergy Rash     Plastic tape     Acetaminophen Itching     Baclofen      hives     Bees Hives, Swelling and Difficulty breathing     Caffeine      Contrast Dye      Hives,   Updated 5/10/2016 CT Contrast.     Iodine Hives     Metoclopramide      Other reaction(s): Tremors  LW Reaction: shaking/sweating     Midazolam      Other reaction(s): Agitation     Monosodium Glutamate      Morphine Other (See Comments)     Difficulty with urination     Other (Do Not Use) Other (See Comments)     Xanaflex- pt becomes disoriented and loses bladder control     Reglan [Metoclopramide Hcl] Other (See Comments)     shaking     Soma [Carisoprodol] Visual Disturbance     Sleep walking     Tizanidine      Topamax Other (See Comments)     Topamax [Topiramate] Nausea     Tingling  GI/Vomit     Tramadol      Severe Headache, Seizure Risk     Tylenol W/Codeine [Acetaminophen-Codeine] Nausea and Itching     Tylenol 3     Versed Other (See Comments)      Zolmitriptan      Makes face feel like its twitching     Droperidol Anxiety     Flu Virus Vaccine Rash      Arm swelling     Hydrocodone-Acetaminophen Nausea and Vomiting and Itching     Pt denies allergy 1/3/11  Pt denies 10/2/18     Family History  Family History   Problem Relation Age of Onset     Gastrointestinal Disease Brother         severe Crohn's     Neurologic Disorder Brother         Seizures post head injury     Depression Brother      Substance Abuse Brother      Genitourinary Problems Father         kidney stones     Diabetes Father      Heart Disease Father         Open heart surgery     Breast Cancer Maternal Grandmother      Parkinsonism Maternal Grandmother      Cerebrovascular Disease Paternal Grandmother      Cancer Maternal Grandfather         Lung     Cardiovascular Paternal Grandfather         Heart Attack     Substance Abuse Mother         in recovery x1 year      Lung Cancer Paternal Uncle      Cancer Paternal Uncle      Lung Cancer Maternal Uncle      Colon Cancer Maternal Uncle      Social History   Social History     Tobacco Use     Smoking status: Former Smoker     Packs/day: 0.25     Years: 5.00     Pack years: 1.25     Types: Cigarettes     Smokeless tobacco: Never Used   Vaping Use     Vaping Use: Former     Substances: CBD     Devices: Disposable, Pre-filled or refillable cartridge   Substance Use Topics     Alcohol use: Not Currently     Comment: Not since last July 2018     Drug use: Not Currently     Types: Marijuana     Comment: Medical Marijuana currently-- More CBD      Past medical history, past surgical history, medications, allergies, family history, and social history were reviewed with the patient. No additional pertinent items.     I have reviewed the Medications, Allergies, Past Medical and Surgical History, and Social History in the Epic system.    Review of Systems   Respiratory: Positive for chest tightness.    Cardiovascular: Positive for chest pain and palpitations.  "  Musculoskeletal: Positive for myalgias (left arm pain ).     A complete review of systems was performed with pertinent positives and negatives noted in the HPI, and all other systems negative.    Physical Exam   BP: (!) 134/91  Pulse: (!) 133  Temp: 97.7  F (36.5  C)  Resp: 16  Height: 157.5 cm (5' 2\")  Weight: 92.1 kg (203 lb 0.5 oz)  SpO2: 98 %      Physical Exam  Vitals and nursing note reviewed.   Constitutional:       General: She is not in acute distress.     Appearance: She is not diaphoretic.   HENT:      Head: Normocephalic and atraumatic.   Cardiovascular:      Rate and Rhythm: Regular rhythm. Tachycardia present.      Pulses: Normal pulses.   Pulmonary:      Effort: Pulmonary effort is normal. No respiratory distress.   Abdominal:      General: There is no distension.      Palpations: Abdomen is soft.      Tenderness: There is no abdominal tenderness. There is no guarding.   Musculoskeletal:         General: No swelling, tenderness, deformity or signs of injury.      Cervical back: Normal range of motion and neck supple.      Comments: Left elbow with no swelling or focal tenderness.  Apparent limited range of motion of left elbow related to pain, but near complete range is possible.     Skin:     General: Skin is warm and dry.      Coloration: Skin is not pale.      Findings: No bruising, erythema or rash.   Neurological:      Mental Status: She is alert and oriented to person, place, and time.   Psychiatric:         Behavior: Behavior normal.         Thought Content: Thought content normal.         ED Course            Procedures               The medical record was reviewed and interpreted.  Current labs reviewed and interpreted.  Current images reviewed and interpreted: Left elbow xray normal.  Managed outpatient prescription medications.   utilized.     Results for orders placed or performed during the hospital encounter of 02/04/22 (from the past 24 hour(s))   EKG 12 lead   Result Value " Ref Range    Systolic Blood Pressure  mmHg    Diastolic Blood Pressure  mmHg    Ventricular Rate 125 BPM    Atrial Rate 125 BPM    SC Interval 112 ms    QRS Duration 78 ms     ms    QTc 450 ms    P Axis 16 degrees    R AXIS 52 degrees    T Axis 27 degrees    Interpretation ECG Sinus tachycardia  Otherwise normal ECG      CBC with platelets differential    Narrative    The following orders were created for panel order CBC with platelets differential.  Procedure                               Abnormality         Status                     ---------                               -----------         ------                     CBC with platelets and d...[[id number]]                                                   Please view results for these tests on the individual orders.     *Note: Due to a large number of results and/or encounters for the requested time period, some results have not been displayed. A complete set of results can be found in Results Review.     Medications - No data to display          Assessments & Plan (with Medical Decision Making)   Katy Islas is a 31 year old female with a history of chrons disease, asthma, hearing loss and chronic pain who presents with left elbow and chest pain.  She has had worsening pain in her left arm for 3 days.  She has a history of a DVT in her right arm.  Her exam is inconsistent with a DVT.  She is having pain at the elbow and pain with range of motion.  Her d dimer is 0.41 and my suspicion for DVT or PE is low.  She was initially tachycardic, but this improved and appeared related to pain.  Her lab evaluation was normal including WBC, CRP and ESR.  Her left elbow xray was normal with no effusion or fracture.  I recommended supportive care and she will follow up with her PCP if continued pain.     I have reviewed the nursing notes.    I have reviewed the findings, diagnosis, plan and need for follow up with the patient.    New Prescriptions    APIXABAN  ANTICOAGULANT (ELIQUIS) 2.5 MG TABLET    Take 1 tablet (2.5 mg) by mouth 2 times daily       Final diagnoses:   None       I, Sreekanth Fernando am serving as a trained medical scribe to document services personally performed by Hernesto Zaman, based on the provider's statements to me.      I, Hernesto Zaman, was physically present and have reviewed and verified the accuracy of this note documented by Sreekanth Fernando.     Hernesto Zaman MD  2/4/2022   [facility] EMERGENCY DEPARTMENT     Hernesto Zaman MD  02/07/22 [id number]

## 2022-02-05 LAB
ATRIAL RATE - MUSE: 125 BPM
DIASTOLIC BLOOD PRESSURE - MUSE: NORMAL MMHG
INTERPRETATION ECG - MUSE: NORMAL
P AXIS - MUSE: 16 DEGREES
PR INTERVAL - MUSE: 112 MS
QRS DURATION - MUSE: 78 MS
QT - MUSE: 312 MS
QTC - MUSE: 450 MS
R AXIS - MUSE: 52 DEGREES
SYSTOLIC BLOOD PRESSURE - MUSE: NORMAL MMHG
T AXIS - MUSE: 27 DEGREES
VENTRICULAR RATE- MUSE: 125 BPM

## 2022-02-07 ENCOUNTER — HOSPITAL ENCOUNTER (EMERGENCY)
Facility: CLINIC | Age: 32
Discharge: HOME | End: 2022-02-07
Attending: EMERGENCY MEDICINE | Admitting: EMERGENCY MEDICINE
Payer: MEDICARE

## 2022-02-07 ENCOUNTER — OFFICE VISIT (OUTPATIENT)
Dept: INTERPRETER SERVICES | Facility: CLINIC | Age: 32
End: 2022-02-07
Payer: MEDICARE

## 2022-02-07 ENCOUNTER — NURSE TRIAGE (OUTPATIENT)
Dept: FAMILY MEDICINE | Facility: CLINIC | Age: 32
End: 2022-02-07
Payer: MEDICARE

## 2022-02-07 VITALS
TEMPERATURE: 98.1 F | OXYGEN SATURATION: 100 % | DIASTOLIC BLOOD PRESSURE: 104 MMHG | HEART RATE: 120 BPM | SYSTOLIC BLOOD PRESSURE: 159 MMHG | WEIGHT: 203 LBS | RESPIRATION RATE: 12 BRPM | BODY MASS INDEX: 37.13 KG/M2

## 2022-02-07 DIAGNOSIS — R07.89 CHEST WALL PAIN: ICD-10-CM

## 2022-02-07 DIAGNOSIS — M79.622 PAIN OF LEFT UPPER ARM: ICD-10-CM

## 2022-02-07 PROCEDURE — 99284 EMERGENCY DEPT VISIT MOD MDM: CPT | Mod: 25 | Performed by: EMERGENCY MEDICINE

## 2022-02-07 PROCEDURE — 99283 EMERGENCY DEPT VISIT LOW MDM: CPT | Performed by: EMERGENCY MEDICINE

## 2022-02-07 PROCEDURE — 93005 ELECTROCARDIOGRAM TRACING: CPT | Performed by: EMERGENCY MEDICINE

## 2022-02-07 PROCEDURE — 93010 ELECTROCARDIOGRAM REPORT: CPT | Performed by: EMERGENCY MEDICINE

## 2022-02-07 PROCEDURE — T1013 SIGN LANG/ORAL INTERPRETER: HCPCS | Mod: U3

## 2022-02-07 RX ORDER — LIDOCAINE 50 MG/G
1 PATCH TOPICAL EVERY 24 HOURS
Qty: 10 PATCH | Refills: 0 | Status: SHIPPED | OUTPATIENT
Start: 2022-02-07

## 2022-02-07 NOTE — ED TRIAGE NOTES
Pt was here Friday due to left arm pain,  Over weekend pain improved, but today it returned and pt presents today with increased chest pain and lump near arm pt, appt is on Wednesday for PCP.

## 2022-02-07 NOTE — TELEPHONE ENCOUNTER
Spoke with patient. Was seen in ED for left elbow pain.      Noticed a lump under her skin on chest on that side.  Piercing pain.  Was nauseated and vomiting.  She was better now worse again.     Per  She's pushing on chest towards upper outside of her breast. lump is about the size of a almond. Also having left arm pain.     mammo and US and breast were fine in Jan.      Advised if pain is worsened she needs to be seen again.  If severe she needs to go back to the ED.  Appointment made for follow up on Wednesday.     Reason for Disposition    Pain also in shoulder(s) or arm(s) or jaw    Additional Information    Negative: Severe difficulty breathing (e.g., struggling for each breath, speaks in single words)    Negative: Passed out (i.e., fainted, collapsed and was not responding)    Negative: Difficult to awaken or acting confused (e.g., disoriented, slurred speech)    Negative: Shock suspected (e.g., cold/pale/clammy skin, too weak to stand, low BP, rapid pulse)    Negative: Chest pain lasting longer than 5 minutes and ANY of the following:* Over 45 years old* Over 30 years old and at least one cardiac risk factor (e.g., diabetes, high blood pressure, high cholesterol, smoker, or strong family history of heart disease)* History of heart disease (i.e., angina, heart attack, heart failure, bypass surgery, takes nitroglycerin)* Pain is crushing, pressure-like, or heavy    Negative: Heart beating < 50 beats per minute OR > 140 beats per minute    Negative: Visible sweat on face or sweat dripping down face    Negative: Sounds like a life-threatening emergency to the triager    Negative: Followed an injury to chest    Negative: SEVERE chest pain    Protocols used: CHEST PAIN-A-OH

## 2022-02-07 NOTE — ED PROVIDER NOTES
"      [facility] EMERGENCY DEPARTMENT ([facility])    2/07/22          History     Chief Complaint   Patient presents with     Arm Pain     Chest Pain     The history is provided by the patient and medical records. The history is limited by a language barrier. A  was used.     Katy Islas is a 31 year old female with a complex past medical history, including hearing loss, Crohn's disease, acute cephalic vein thrombosis on Eliquis, chronic pain, mild asthma, chronic back pain, s/p lumbar fusion, multiple orthopedic pain complaints, concern for drug-seeking behavior, depression, anxiety, bipolar, and borderline personality who presents to the ED for evaluation of left arm pain and chest pain.  Of note, the patient has bilateral hearing loss and an  was used. Since 2/4/2022 she has been having pain in the left arm.  The pain got better on 2/5/2022 but now it has returned.  She describes this pain as cramping and burning.  She also reports a lump in the left axillary area that she noticed this morning.  She reports that she contacted her PCP about this and they told her to come into the ED and also make an appointment for Wednesday.  She states that the arm pain gets worse when moving.  She reports a temperature of 99 degrees.  Denies difficulty breathing though notes that when she lays flat she has a \"heaviness\" in her chest.  She reports that she is still taking Eliquis.  No other complaints.    ELBOW LEFT TWO VIEWS 2/4/22  IMPRESSION: No acute change. Normal joint spaces and alignment. No  definite fracture or sizable joint effusion.     Past Medical History  Past Medical History:   Diagnosis Date     Abdominal pain 10/31- 11/4/2005    [facility] admit for Crohn's     Acute ulcerative colitis (H)      Allergic rhinitis, cause unspecified     Allergic rhinitis     Arthritis      C. difficile diarrhea     Past, no current diarrhea.     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, without mention of intractable epilepsy     pseudoseizures diagnosed after extensive neurologic eval     Migraine 07/21/12    D/C 07/22/12-[facility]     Migraine, unspecified, without mention of intractable migraine without mention of status migrainosus     Migraine     Mild intermittent asthma     mild intermittent     Mycoplasma infection in conditions classified elsewhere and of unspecified site      Other chronic pain     Back pain for 6 years     Renal disease     Kidney stones     Unspecified hearing loss     congenital hearing loss     Past Surgical History:   Procedure Laterality Date     AS REMOVAL OF COCCYX  10/18/2017     COLONOSCOPY  7/1/2009    [facility] [place]     COLONOSCOPY N/A 7/17/2019    Procedure: Colonoscopy, With Polypectomy And Biopsy;  Surgeon: Edwin Conde MD;  Location:  OR     COLONOSCOPY N/A 1/11/2022    Procedure: COLONOSCOPY, WITH POLYPECTOMY AND BIOPSY;  Surgeon: Bob Ugarte DO;  Location:  GI     COLONOSCOPY WITH CO2 INSUFFLATION N/A 7/17/2019    Procedure: COLONOSCOPY, WITH CO2 INSUFFLATION;  Surgeon: Edwin Conde MD;  Location:  OR     COMBINED ESOPHAGOSCOPY, GASTROSCOPY, DUODENOSCOPY (EGD) WITH CO2 INSUFFLATION N/A 4/12/2019    Procedure: COMBINED ESOPHAGOSCOPY, GASTROSCOPY, DUODENOSCOPY (EGD) WITH CO2 INSUFFLATION;  Surgeon: Edwin Conde MD;  Location:  OR     ESOPHAGOSCOPY, GASTROSCOPY, DUODENOSCOPY (EGD), COMBINED N/A  4/12/2019    Procedure: Combined Esophagoscopy, Gastroscopy, Duodenoscopy (Egd), Biopsy Single Or Multiple;  Surgeon: Edwin Conde MD;  Location: [facility] OR     FISTULOTOMY RECTUM N/A 4/30/2020    Procedure: EXAM UNDER ANESTHESIA, ANUS, parital fistulotomy;  Surgeon: Valentin Sanchez MD;  Location: [facility] OR     FUSION SPINE POSTERIOR MINIMALLY INVASIVE ONE LEVEL N/A 2/23/2017    Procedure: FUSION SPINE POSTERIOR MINIMALLY INVASIVE ONE LEVEL;  L4-5 Oblique Lateral Lumbar Interbody Fusion   Epidural steroid injection.   Transpedicular Bone marrow aspiration;  Surgeon: Jeniffer Eugene MD;  Location: [facility] OR     HC EEG AWAKE AND SLEEP      abnormal     HC MRI BRAIN W/O CONTRAST  12/00    normal     HC REMOVAL GALLBLADDER  8/5/2009    [facility].     HC UGI ENDOSCOPY DIAG W BIOPSY  11/11/09    Normal esophagus     ORTHOPEDIC SURGERY  October 19,2011    diskectomy L4-L5     ZZC FUSION OF SACROILIAC JOINT  10/26/2018     ZZ COLONOSCOPY THRU STOMA WITH BIOPSY  10/29/2003    Impression is that of normal appearing colonoscopy, without evidence of rectal bleeding.     [facility] COLONOSCOPY THRU STOMA, DIAGNOSTIC  10/00    normal     [facility] COLONOSCOPY THRU STOMA, DIAGNOSTIC  Oct 2009    Dr López- normal     [facility] UGI ENDOSCOPY, SIMPLE EXAM  7/00, 10/00    mild chronic esophagitis and duodenitis, neg H pylori     [facility] UGI ENDOSCOPY, SIMPLE EXAM  01/20/2005    Esophagogastroduodenoscopy, colonoscopy with biopsies.  [facility] UGI ENDOSCOPY, SIMPLE EXAM  7/1/2009    [facility].     [facility] UGI ENDOSCOPY, SIMPLE EXAM  11/11/2009    attempted upper GI, pt. could not tolerate procedure:[facility]     acetaminophen-codeine (TYLENOL #3) 300-30 MG tablet  albuterol (PROAIR HFA/PROVENTIL HFA/VENTOLIN HFA) 108 (90 Base) MCG/ACT inhaler  amoxicillin-clavulanate (AUGMENTIN) 875-125 MG tablet  apixaban ANTICOAGULANT (ELIQUIS) 2.5 MG tablet  ARIPiprazole  (ABILIFY) 30 MG tablet  azelastine (OPTIVAR) 0.05 % ophthalmic solution  cyclobenzaprine (FLEXERIL) 10 MG tablet  dicyclomine (BENTYL) 20 MG tablet  DULoxetine (CYMBALTA) 30 MG capsule  EPINEPHrine (ANY BX GENERIC EQUIV) 0.3 MG/0.3ML injection 2-pack  gabapentin (NEURONTIN) 300 MG capsule  lidocaine, viscous, (XYLOCAINE) 2 % solution  menthol-zinc oxide (CALMOSEPTINE) 0.44-20.625 % OINT ointment  mesalamine (LIALDA) 1.2 g EC tablet  mesalamine (ROWASA) 4 g enema  NIFEdipine ER OSMOTIC (PROCARDIA XL) 60 MG 24 hr tablet  ondansetron (ZOFRAN) 4 MG tablet  oxyCODONE (ROXICODONE) 5 MG tablet  pantoprazole (PROTONIX) 40 MG EC tablet  polyethylene glycol (MIRALAX) 17 g packet  polyethylene glycol (MIRALAX) 17 GM/Dose powder  rizatriptan (MAXALT-MLT) 5 MG ODT  sucralfate (CARAFATE) 1 GM tablet  valACYclovir (VALTREX) 1000 mg tablet      Allergies   Allergen Reactions     Dexamethasone Unknown     Iohexol Hives     Other reaction(s): Hives  IV contrast; patient states she tolerates contrast if she gets benadryl before  IV contrast; patient states she tolerates contrast if she gets benadryl before     Nsaids Other (See Comments) and Hives     GI BLEED x2  Tolerated toradol     Other Environmental Allergy Rash     Plastic tape     Acetaminophen Itching     Baclofen      hives     Bees Hives, Swelling and Difficulty breathing     Caffeine      Contrast Dye      Hives,   Updated 5/10/2016 CT Contrast.     Iodine Hives     Metoclopramide      Other reaction(s): Tremors  LW Reaction: shaking/sweating     Midazolam      Other reaction(s): Agitation     Monosodium Glutamate      Morphine Other (See Comments)     Difficulty with urination     Other (Do Not Use) Other (See Comments)     Xanaflex- pt becomes disoriented and loses bladder control     Reglan [Metoclopramide Hcl] Other (See Comments)     shaking     Soma [Carisoprodol] Visual Disturbance     Sleep walking     Tizanidine      Topamax Other (See Comments)     Topamax  [Topiramate] Nausea     Tingling  GI/Vomit     Tramadol      Severe Headache, Seizure Risk     Tylenol W/Codeine [Acetaminophen-Codeine] Nausea and Itching     Tylenol 3     Versed Other (See Comments)     Zolmitriptan      Makes face feel like its twitching     Droperidol Anxiety     Flu Virus Vaccine Rash      Arm swelling     Hydrocodone-Acetaminophen Nausea and Vomiting and Itching     Pt denies allergy 1/3/11  Pt denies 10/2/18     Family History  Family History   Problem Relation Age of Onset     Gastrointestinal Disease Brother         severe Crohn's     Neurologic Disorder Brother         Seizures post head injury     Depression Brother      Substance Abuse Brother      Genitourinary Problems Father         kidney stones     Diabetes Father      Heart Disease Father         Open heart surgery     Breast Cancer Maternal Grandmother      Parkinsonism Maternal Grandmother      Cerebrovascular Disease Paternal Grandmother      Cancer Maternal Grandfather         Lung     Cardiovascular Paternal Grandfather         Heart Attack     Substance Abuse Mother         in recovery x1 year      Lung Cancer Paternal Uncle      Cancer Paternal Uncle      Lung Cancer Maternal Uncle      Colon Cancer Maternal Uncle      Social History   Social History     Tobacco Use     Smoking status: Former Smoker     Packs/day: 0.25     Years: 5.00     Pack years: 1.25     Types: Cigarettes     Smokeless tobacco: Never Used   Vaping Use     Vaping Use: Former     Substances: CBD     Devices: Disposable, Pre-filled or refillable cartridge   Substance Use Topics     Alcohol use: Not Currently     Comment: Not since last July 2018     Drug use: Not Currently     Types: Marijuana     Comment: Medical Marijuana currently-- More CBD      Past medical history, past surgical history, medications, allergies, family history, and social history were reviewed with the patient. No additional pertinent items.       Review of Systems   Respiratory:         Heaviness when supine   Musculoskeletal:        Left arm pain   All other systems reviewed and are negative.    A complete review of systems was performed with pertinent positives and negatives noted in the HPI, and all other systems negative.    Physical Exam   BP: (!) 159/104  Pulse: 120  Temp: 98.1  F (36.7  C)  Resp: 12  Weight: 92.1 kg (203 lb)  SpO2: 100 %  Physical Exam  Constitutional:       Appearance: She is well-developed.      Comments: Hard of hearing; communicates by reading lips and ASL   HENT:      Head: Normocephalic and atraumatic.   Cardiovascular:      Rate and Rhythm: Normal rate and regular rhythm.      Heart sounds: Normal heart sounds.   Pulmonary:      Effort: Pulmonary effort is normal. No respiratory distress.      Breath sounds: Normal breath sounds.      Comments: No palpable bumps or lumps in the left chest wall.  No masses palpable in the left axilla.  No skin changes in the left chest wall.  Chest:      Chest wall: No mass, deformity or edema.      Comments: Pain in the left chest wall with movement of the left shoulder.  No bony changes.  No skin changes.  Normal flexion and extension of the left elbow.  Abdominal:      General: There is no distension.      Palpations: Abdomen is soft.      Tenderness: There is no abdominal tenderness. There is no rebound.   Musculoskeletal:         General: No tenderness.      Cervical back: Normal range of motion.   Skin:     General: Skin is warm and dry.   Neurological:      Mental Status: She is alert and oriented to person, place, and time.   Psychiatric:         Behavior: Behavior normal.         Thought Content: Thought content normal.         ED Course     12:24 PM  The patient was seen and examined by Korina Alvarez MD in Room ED06.     Procedures       The medical record was reviewed and interpreted.  Current labs reviewed and interpreted.  Previous labs reviewed and interpreted.     No results found for any visits on  02/07/22.    Medications - No data to display           EKG Interpretation:      Interpreted by Korina Alvarez MD  Time reviewed:1108am   Symptoms at time of EKG: none   Rhythm: sinus tachycardia   Rate: 113  Axis: NORMAL  Ectopy: none  Conduction: normal  ST Segments/ T Waves: No ST-T wave changes  Q Waves: none  Comparison to prior: Unchanged    Clinical Impression: normal EKG       No results found for any visits on 02/07/22.  Medications - No data to display     Assessments & Plan (with Medical Decision Making)   Katy Islas is a 31-year-old female who is hard of hearing with also a history of fibromyalgia chronic pain who presents to the ER complaining of left arm pain.  Patient felt a bump in her left chest wall/armpit area.  Patient here has no palpable mass in this region.  She has no tenderness to pinpoint touch.  She has no abscess.  No skin changes.  I am not concerned about an infection in this region.  Patient was seen for ongoing pain of her left arm a few days prior.  At that time she had a D-dimer and lab work as well as a chest x-ray that were all normal.  Patient's pain is more when she moves her left shoulder.  Appears that more muscular or due to some nerve pain.  Will be prescribed some Voltaren gel and some lidocaine patches.  Patient's pain does not seem typical for ACS or acute lung issues.  Plan of care was discussed with the patient with the [facility] .  Plan will be to discharge her home.    This part of the medical record was transcribed by Katy Helms, Medical Scribe, from a dictation done by Korina Alvarez MD.     I have reviewed the nursing notes. I have reviewed the findings, diagnosis, plan and need for follow up with the patient.    New Prescriptions    No medications on file       Final diagnoses:   Pain of left upper arm   Chest wall pain     I, Katy Helms, am serving as a trained medical scribe to document services personally performed by Korina Mendes  Antonio ELLIOTT based on the provider's statements to me on February 7, 2022.  This document has been checked and approved by the attending provider.    I, Korina Alvarez MD, was physically present and have reviewed and verified the accuracy of this note documented by Katy Helms, medical scribe.      Korina Alvarez MD      --  Korina Alvarez MD  [facility] EMERGENCY DEPARTMENT  2/7/2022     Korina Alvarez MD  02/07/22 1514       Korina Alvarez MD  02/07/22 1517

## 2022-02-07 NOTE — DISCHARGE INSTRUCTIONS
Your EKG shows no signs of heart problems.     There are no signs of acute infection based on your exam.     Use the lidocaine pain patches and the pain voltaren gel for pain control.     Please make an appointment to follow up with Your Primary Care Provider in 3-5 days even if entirely better.

## 2022-02-07 NOTE — PROGRESS NOTES
Assessment & Plan   1. Lumbar disc disease with radiculopathy: Refill medication for patient.  - acetaminophen-codeine (TYLENOL #3) 300-30 MG tablet; Take 1-2 tablets by mouth every 6 hours as needed for severe pain  Dispense: 25 tablet; Refill: 0    2. Raynaud's disease without gangrene: Refill medication for patient given insurance difficulties.  - NIFEdipine ER OSMOTIC (PROCARDIA XL) 60 MG 24 hr tablet; Take 1 tablet (60 mg) by mouth daily  Dispense: 90 tablet; Refill: 0    3. Adjustment disorder with mixed anxiety and depressed mood: Refilled medication. Was able to find a good Rx coupon for much cheaper price at [facility].  - ARIPiprazole (ABILIFY) 30 MG tablet; Take 1 tablet (30 mg) by mouth At Bedtime  Dispense: 90 tablet; Refill: 0    4. Anxiety: As above.  - ARIPiprazole (ABILIFY) 30 MG tablet; Take 1 tablet (30 mg) by mouth At Bedtime  Dispense: 90 tablet; Refill: 0    5. Aphthous ulcer: Recommend conservative treatment at this time.    Patient is a high utilizer of the emergency department. I did asked that she returns to the states she come back in to see me in seeing if I can work patient in prior to going to the emergency department for needs. She is agreeable with this plan. She will look into other insurance.      Return in about 1 week (around 2/16/2022), or if symptoms worsen or fail to improve.    Bacilio Adams MD  [facility]    This chart is completed utilizing dictation software; typos and/or incorrect word substitutions may unintentionally occur.    Alex Burns is a 31 year old who presents for the following health issues  accompanied by her .    History of Present Illness       She eats 2-3 servings of fruits and vegetables daily.She consumes 1 sweetened beverage(s) daily.She exercises with enough effort to increase her heart rate 30 to 60 minutes per day.  She exercises with enough effort to increase her heart rate 3 or less days per  "week. She is missing 2 dose(s) of medications per week.     Patient presents today with no particular concerns. She was seen in the ER twice since last seen for intermittent chest and left arm pain. Work-up at that time was unrevealing. X-rays were negative. Her symptoms have not returned.    She is planning on going back to [facility]. She just picked up her car.    She is concerned because her insurance will be done at the end of the month and she does not have insurance for after this. She is hoping to get her medications refilled if possible. We reviewed her medications and she is requesting her Tylenol 3 with codeine, Abilify, and nifedipine refilled. She states she takes the nifedipine for Raynaud's. She believes her Abilify helps her stay more focused and helps with her anxiety. She is out of her Tylenol 3 for back pain previously given. I did discuss this with her the last time I fill this for her. She needs to follow-up with her PCP back in [place].    Review of Systems   Constitutional, HEENT, cardiovascular, pulmonary, gi and gu systems are negative, except as otherwise noted.      Objective    BP (!) 128/94   Pulse (!) 126   Temp 97.4  F (36.3  C) (Temporal)   Resp 16   Ht 1.575 m (5' 2.01\")   Wt 89.4 kg (197 lb)   SpO2 99%   BMI 36.02 kg/m    Body mass index is 36.02 kg/m .  Physical Exam   General: Appears well and in no acute distress.  HEENT: Small approximately 3 mm if he is ulcer lateral right upper gum. Otherwise eyes grossly normal to inspection. Extraocular movements intact. Pupils equal, round, and reactive to light. Mucous membranes moist. No ulcers or lesions noted in the oropharynx.   Cardiovascular: Tachycardia. Otherwise regular rate and rhythm, normal S1 and S2 without murmur. No extra heartsounds or friction rub. Radial pulses present and equal bilaterally.  Respiratory: ungs clear to auscultation bilaterally. No wheezing or crackles. No prolonged expiration. Symmetrical " chest rise.  Musculoskeletal:  No gross extremity deformities. No peripheral edema. Normal muscle bulk.     Labs: None

## 2022-02-07 NOTE — ED NOTES
Pt on call light and upon entry for discharge pt with coat and purse on at doorway. Pt declines vitals. All interpretation through .

## 2022-02-08 LAB
ATRIAL RATE - MUSE: 113 BPM
DIASTOLIC BLOOD PRESSURE - MUSE: NORMAL MMHG
INTERPRETATION ECG - MUSE: NORMAL
P AXIS - MUSE: 30 DEGREES
PR INTERVAL - MUSE: 118 MS
QRS DURATION - MUSE: 80 MS
QT - MUSE: 332 MS
QTC - MUSE: 455 MS
R AXIS - MUSE: 40 DEGREES
SYSTOLIC BLOOD PRESSURE - MUSE: NORMAL MMHG
T AXIS - MUSE: 35 DEGREES
VENTRICULAR RATE- MUSE: 113 BPM

## 2022-02-09 ENCOUNTER — OFFICE VISIT (OUTPATIENT)
Dept: FAMILY MEDICINE | Facility: CLINIC | Age: 32
End: 2022-02-09
Payer: MEDICARE

## 2022-02-09 VITALS
SYSTOLIC BLOOD PRESSURE: 120 MMHG | TEMPERATURE: 97.4 F | WEIGHT: 197 LBS | HEART RATE: 106 BPM | BODY MASS INDEX: 36.25 KG/M2 | OXYGEN SATURATION: 97 % | HEIGHT: 62 IN | RESPIRATION RATE: 18 BRPM | DIASTOLIC BLOOD PRESSURE: 92 MMHG

## 2022-02-09 DIAGNOSIS — K12.0 APHTHOUS ULCER: ICD-10-CM

## 2022-02-09 DIAGNOSIS — F41.9 ANXIETY: ICD-10-CM

## 2022-02-09 DIAGNOSIS — I73.00 RAYNAUD'S DISEASE WITHOUT GANGRENE: Primary | ICD-10-CM

## 2022-02-09 DIAGNOSIS — M51.16 LUMBAR DISC DISEASE WITH RADICULOPATHY: ICD-10-CM

## 2022-02-09 DIAGNOSIS — F43.23 ADJUSTMENT DISORDER WITH MIXED ANXIETY AND DEPRESSED MOOD: ICD-10-CM

## 2022-02-09 PROCEDURE — 99214 OFFICE O/P EST MOD 30 MIN: CPT | Performed by: FAMILY MEDICINE

## 2022-02-09 PROCEDURE — T1013 SIGN LANG/ORAL INTERPRETER: HCPCS | Mod: U3 | Performed by: FAMILY MEDICINE

## 2022-02-09 RX ORDER — ARIPIPRAZOLE 30 MG/1
30 TABLET ORAL AT BEDTIME
Qty: 90 TABLET | Refills: 0 | Status: SHIPPED | OUTPATIENT
Start: 2022-02-09

## 2022-02-09 RX ORDER — NIFEDIPINE 60 MG/1
60 TABLET, EXTENDED RELEASE ORAL DAILY
Qty: 90 TABLET | Refills: 0 | Status: SHIPPED | OUTPATIENT
Start: 2022-02-09

## 2022-02-09 ASSESSMENT — PAIN SCALES - GENERAL: PAINLEVEL: SEVERE PAIN (6)

## 2022-02-09 ASSESSMENT — MIFFLIN-ST. JEOR: SCORE: 1556.97

## 2022-02-22 ENCOUNTER — TELEPHONE (OUTPATIENT)
Dept: FAMILY MEDICINE | Facility: CLINIC | Age: 32
End: 2022-02-22
Payer: MEDICARE

## 2022-02-22 DIAGNOSIS — Z91.89 HISTORY OF DRUG OVERDOSE: Primary | ICD-10-CM

## 2022-02-22 NOTE — TELEPHONE ENCOUNTER
"Dr. Jacklyn Summers calls from [place] regarding this mutual pt. She is having her 3rd visit tomorrow with pt since she has returned to [place]. Provider pulled up Epic and Care Everywhere and saw the visits in [place]. She also saw that provider here was giving pt controlled substances and wanted PCP to be aware of certain things.    11/12/21-Pt stole fentanyl patches from brother and OD'd on this and hydrocodone. She was held in the hospital on a psychiatric hold. She's been \"doctor shopping\" down there. Also, colonoscopy shopping. Pt informed Dr. Summers that she needed a colonoscopy done because she hadn't had one in two years and Dr. Summers just noted that it was done last month up here.      Dr. Summers states that they are NOT prescribing controlled substances at all to pt anymore. She was referred to pain clinic down there and no showed.     If PCP would like to see information on pt, look at notes regarding [facility] and [facility]. These are the two systems pt uses down there if provider wants to look at this information.    Phone number present if PCP has questions. This was just an informational call.    Merlyn Lee, EDYTAN, RN, PHN  Registered Nurse-Clinic Triage  [facility]  2/22/2022 at 3:32 PM     "

## 2022-02-23 NOTE — TELEPHONE ENCOUNTER
Added overdose to problems list. Will no longer prescribe controlled substance.    Bacilio Adams MD  [facility]

## 2022-03-02 DIAGNOSIS — G89.29 CHRONIC BILATERAL LOW BACK PAIN WITH LEFT-SIDED SCIATICA: ICD-10-CM

## 2022-03-02 DIAGNOSIS — M54.42 CHRONIC BILATERAL LOW BACK PAIN WITH LEFT-SIDED SCIATICA: ICD-10-CM

## 2022-03-03 RX ORDER — CYCLOBENZAPRINE HCL 10 MG
10 TABLET ORAL 3 TIMES DAILY PRN
Qty: 90 TABLET | Refills: 0 | OUTPATIENT
Start: 2022-03-03

## 2022-03-03 NOTE — TELEPHONE ENCOUNTER
Pending Prescriptions:                       Disp   Refills    cyclobenzaprine (FLEXERIL) 10 MG tablet    90 tab*0        Sig: Take 1 tablet (10 mg) by mouth 3 times daily as           needed (pain)    Routing refill request to provider for review/approval because:  Drug not on the [facility] refill protocol     Requested Prescriptions   Pending Prescriptions Disp Refills    cyclobenzaprine (FLEXERIL) 10 MG tablet 90 tablet 0     Sig: Take 1 tablet (10 mg) by mouth 3 times daily as needed (pain)        There is no refill protocol information for this order

## 2022-03-19 DIAGNOSIS — G89.29 CHRONIC BILATERAL LOW BACK PAIN WITH LEFT-SIDED SCIATICA: ICD-10-CM

## 2022-03-19 DIAGNOSIS — M54.42 CHRONIC BILATERAL LOW BACK PAIN WITH LEFT-SIDED SCIATICA: ICD-10-CM

## 2022-03-22 RX ORDER — CYCLOBENZAPRINE HCL 10 MG
10 TABLET ORAL 3 TIMES DAILY PRN
Qty: 45 TABLET | OUTPATIENT
Start: 2022-03-22

## 2022-03-22 NOTE — TELEPHONE ENCOUNTER
Pending Prescriptions:                       Disp   Refills    cyclobenzaprine (FLEXERIL) 10 MG tablet [P*45 tab*         Sig: Take 1 tablet (10 mg) by mouth 3 times daily as           needed (pain)    Routing refill request to provider for review/approval because:  Drug not on the [facility] refill protocol   Last OV: 2/2/22  Sujata Howard RN    This should be addressed by / Bryan.

## 2022-04-11 NOTE — TELEPHONE ENCOUNTER
Referrals for both placed.    Zach Rosario PA-C  [facility]      Duration Of Freeze Thaw-Cycle (Seconds): 5 Number Of Freeze-Thaw Cycles: 2 freeze-thaw cycles Render Note In Bullet Format When Appropriate: No Consent: The patient's consent was obtained including but not limited to risks of crusting, scabbing, blistering, scarring, darker or lighter pigmentary change, recurrence, incomplete removal and infection. Show Applicator Variable?: Yes Detail Level: Detailed Post-Care Instructions: I reviewed with the patient in detail post-care instructions. Patient is to wear sunprotection, and avoid picking at any of the treated lesions. Pt may apply Vaseline to crusted or scabbing areas.

## 2022-04-13 ENCOUNTER — APPOINTMENT (OUTPATIENT)
Dept: CT IMAGING | Facility: HOSPITAL | Age: 32
End: 2022-04-13

## 2022-04-13 ENCOUNTER — HOSPITAL ENCOUNTER (EMERGENCY)
Facility: HOSPITAL | Age: 32
Discharge: HOME | End: 2022-04-13
Attending: EMERGENCY MEDICINE | Admitting: EMERGENCY MEDICINE

## 2022-04-13 VITALS
HEART RATE: 90 BPM | TEMPERATURE: 98.5 F | WEIGHT: 195 LBS | HEIGHT: 62 IN | SYSTOLIC BLOOD PRESSURE: 119 MMHG | RESPIRATION RATE: 18 BRPM | DIASTOLIC BLOOD PRESSURE: 81 MMHG | OXYGEN SATURATION: 96 % | BODY MASS INDEX: 35.88 KG/M2

## 2022-04-13 DIAGNOSIS — R10.9 ABDOMINAL PAIN, UNSPECIFIED ABDOMINAL LOCATION: Primary | ICD-10-CM

## 2022-04-13 LAB
ALBUMIN SERPL-MCNC: 4.6 G/DL (ref 3.5–5.2)
ALBUMIN/GLOB SERPL: 1.6 G/DL
ALP SERPL-CCNC: 98 U/L (ref 39–117)
ALT SERPL W P-5'-P-CCNC: 27 U/L (ref 1–33)
ANION GAP SERPL CALCULATED.3IONS-SCNC: 13 MMOL/L (ref 5–15)
AST SERPL-CCNC: 21 U/L (ref 1–32)
BACTERIA UR QL AUTO: ABNORMAL /HPF
BASOPHILS # BLD AUTO: 0 10*3/MM3 (ref 0–0.2)
BASOPHILS NFR BLD AUTO: 0.4 % (ref 0–1.5)
BILIRUB SERPL-MCNC: 0.5 MG/DL (ref 0–1.2)
BILIRUB UR QL STRIP: NEGATIVE
BUN SERPL-MCNC: 14 MG/DL (ref 6–20)
BUN/CREAT SERPL: 16.1 (ref 7–25)
CALCIUM SPEC-SCNC: 9.9 MG/DL (ref 8.6–10.5)
CHLORIDE SERPL-SCNC: 103 MMOL/L (ref 98–107)
CLARITY UR: CLEAR
CO2 SERPL-SCNC: 23 MMOL/L (ref 22–29)
COLOR UR: YELLOW
CREAT SERPL-MCNC: 0.87 MG/DL (ref 0.57–1)
DEPRECATED RDW RBC AUTO: 45.9 FL (ref 37–54)
EGFRCR SERPLBLD CKD-EPI 2021: 90.3 ML/MIN/1.73
EOSINOPHIL # BLD AUTO: 0 10*3/MM3 (ref 0–0.4)
EOSINOPHIL NFR BLD AUTO: 0.6 % (ref 0.3–6.2)
ERYTHROCYTE [DISTWIDTH] IN BLOOD BY AUTOMATED COUNT: 13.2 % (ref 12.3–15.4)
GLOBULIN UR ELPH-MCNC: 2.8 GM/DL
GLUCOSE SERPL-MCNC: 90 MG/DL (ref 65–99)
GLUCOSE UR STRIP-MCNC: NEGATIVE MG/DL
HCG SERPL QL: NEGATIVE
HCT VFR BLD AUTO: 39.6 % (ref 34–46.6)
HGB BLD-MCNC: 14 G/DL (ref 12–15.9)
HGB UR QL STRIP.AUTO: NEGATIVE
HOLD SPECIMEN: NORMAL
HYALINE CASTS UR QL AUTO: ABNORMAL /LPF
KETONES UR QL STRIP: NEGATIVE
LEUKOCYTE ESTERASE UR QL STRIP.AUTO: ABNORMAL
LIPASE SERPL-CCNC: 33 U/L (ref 13–60)
LYMPHOCYTES # BLD AUTO: 2.2 10*3/MM3 (ref 0.7–3.1)
LYMPHOCYTES NFR BLD AUTO: 31.8 % (ref 19.6–45.3)
MCH RBC QN AUTO: 34.7 PG (ref 26.6–33)
MCHC RBC AUTO-ENTMCNC: 35.4 G/DL (ref 31.5–35.7)
MCV RBC AUTO: 98.2 FL (ref 79–97)
MONOCYTES # BLD AUTO: 0.8 10*3/MM3 (ref 0.1–0.9)
MONOCYTES NFR BLD AUTO: 10.9 % (ref 5–12)
NEUTROPHILS NFR BLD AUTO: 3.9 10*3/MM3 (ref 1.7–7)
NEUTROPHILS NFR BLD AUTO: 56.3 % (ref 42.7–76)
NITRITE UR QL STRIP: NEGATIVE
NRBC BLD AUTO-RTO: 0 /100 WBC (ref 0–0.2)
PH UR STRIP.AUTO: <=5 [PH] (ref 5–8)
PLATELET # BLD AUTO: 258 10*3/MM3 (ref 140–450)
PMV BLD AUTO: 7.1 FL (ref 6–12)
POTASSIUM SERPL-SCNC: 4.3 MMOL/L (ref 3.5–5.2)
PROT SERPL-MCNC: 7.4 G/DL (ref 6–8.5)
PROT UR QL STRIP: NEGATIVE
RBC # BLD AUTO: 4.04 10*6/MM3 (ref 3.77–5.28)
RBC # UR STRIP: ABNORMAL /HPF
REF LAB TEST METHOD: ABNORMAL
SODIUM SERPL-SCNC: 139 MMOL/L (ref 136–145)
SP GR UR STRIP: 1.02 (ref 1–1.03)
SQUAMOUS #/AREA URNS HPF: ABNORMAL /HPF
UROBILINOGEN UR QL STRIP: ABNORMAL
WBC # UR STRIP: ABNORMAL /HPF
WBC NRBC COR # BLD: 6.9 10*3/MM3 (ref 3.4–10.8)

## 2022-04-13 PROCEDURE — 25010000002 DIPHENHYDRAMINE PER 50 MG: Performed by: EMERGENCY MEDICINE

## 2022-04-13 PROCEDURE — 0 MORPHINE SULFATE 4 MG/ML SOLUTION: Performed by: EMERGENCY MEDICINE

## 2022-04-13 PROCEDURE — 36415 COLL VENOUS BLD VENIPUNCTURE: CPT

## 2022-04-13 PROCEDURE — 96375 TX/PRO/DX INJ NEW DRUG ADDON: CPT

## 2022-04-13 PROCEDURE — 96376 TX/PRO/DX INJ SAME DRUG ADON: CPT

## 2022-04-13 PROCEDURE — 81001 URINALYSIS AUTO W/SCOPE: CPT | Performed by: EMERGENCY MEDICINE

## 2022-04-13 PROCEDURE — 83690 ASSAY OF LIPASE: CPT | Performed by: EMERGENCY MEDICINE

## 2022-04-13 PROCEDURE — 99283 EMERGENCY DEPT VISIT LOW MDM: CPT

## 2022-04-13 PROCEDURE — 96374 THER/PROPH/DIAG INJ IV PUSH: CPT

## 2022-04-13 PROCEDURE — 25010000002 ONDANSETRON PER 1 MG: Performed by: EMERGENCY MEDICINE

## 2022-04-13 PROCEDURE — 84703 CHORIONIC GONADOTROPIN ASSAY: CPT | Performed by: EMERGENCY MEDICINE

## 2022-04-13 PROCEDURE — 25010000002 MORPHINE PER 10 MG: Performed by: EMERGENCY MEDICINE

## 2022-04-13 PROCEDURE — 74176 CT ABD & PELVIS W/O CONTRAST: CPT

## 2022-04-13 PROCEDURE — 80053 COMPREHEN METABOLIC PANEL: CPT | Performed by: EMERGENCY MEDICINE

## 2022-04-13 PROCEDURE — 85025 COMPLETE CBC W/AUTO DIFF WBC: CPT | Performed by: EMERGENCY MEDICINE

## 2022-04-13 RX ORDER — MORPHINE SULFATE 4 MG/ML
4 INJECTION, SOLUTION INTRAMUSCULAR; INTRAVENOUS ONCE
Status: COMPLETED | OUTPATIENT
Start: 2022-04-13 | End: 2022-04-13

## 2022-04-13 RX ORDER — MORPHINE SULFATE 2 MG/ML
2 INJECTION, SOLUTION INTRAMUSCULAR; INTRAVENOUS ONCE
Status: COMPLETED | OUTPATIENT
Start: 2022-04-13 | End: 2022-04-13

## 2022-04-13 RX ORDER — ONDANSETRON 4 MG/1
4 TABLET, FILM COATED ORAL EVERY 8 HOURS PRN
Qty: 20 TABLET | Refills: 0 | Status: SHIPPED | OUTPATIENT
Start: 2022-04-13 | End: 2022-04-19

## 2022-04-13 RX ORDER — HYDROCODONE BITARTRATE AND ACETAMINOPHEN 5; 325 MG/1; MG/1
1 TABLET ORAL EVERY 4 HOURS PRN
Qty: 15 TABLET | Refills: 0 | Status: SHIPPED | OUTPATIENT
Start: 2022-04-13

## 2022-04-13 RX ORDER — DIPHENHYDRAMINE HYDROCHLORIDE 50 MG/ML
25 INJECTION INTRAMUSCULAR; INTRAVENOUS ONCE
Status: COMPLETED | OUTPATIENT
Start: 2022-04-13 | End: 2022-04-13

## 2022-04-13 RX ORDER — ONDANSETRON 2 MG/ML
4 INJECTION INTRAMUSCULAR; INTRAVENOUS EVERY 6 HOURS PRN
Status: DISCONTINUED | OUTPATIENT
Start: 2022-04-13 | End: 2022-04-13 | Stop reason: HOSPADM

## 2022-04-13 RX ADMIN — MORPHINE SULFATE 4 MG: 4 INJECTION INTRAVENOUS at 05:54

## 2022-04-13 RX ADMIN — DIPHENHYDRAMINE HYDROCHLORIDE 25 MG: 50 INJECTION, SOLUTION INTRAMUSCULAR; INTRAVENOUS at 05:55

## 2022-04-13 RX ADMIN — ONDANSETRON 4 MG: 2 INJECTION INTRAMUSCULAR; INTRAVENOUS at 06:18

## 2022-04-13 RX ADMIN — MORPHINE SULFATE 2 MG: 2 INJECTION, SOLUTION INTRAMUSCULAR; INTRAVENOUS at 07:53

## 2022-04-13 RX ADMIN — SODIUM CHLORIDE 1000 ML: 9 INJECTION, SOLUTION INTRAVENOUS at 04:06

## 2022-04-13 NOTE — ED NOTES
Pt asleep when charge nurse answered call light. Charge nurse woke pt up, pt asked for a blanket, blanket given to pt and boyfriend. Boyfriend came out of room and asking questions, pt and boyfriend updated, boyfriend walked back into the room and threw the clipboard he used to communicate with.

## 2022-04-13 NOTE — ED PROVIDER NOTES
Subjective   32-year-old female with history of Crohn's disease and recent C. difficile colitis diagnosed at outlying facility per patient.  Patient was given Flagyl p.o. on April 6.  Patient complains of persistent frequent watery nonbloody stools with nausea and vomiting and moderate right-sided abdominal pain, worse with movement, no fever.          Review of Systems   Constitutional: Negative.    HENT: Negative.    Respiratory: Negative.    Cardiovascular: Negative.    Gastrointestinal:        As per HPI   Genitourinary: Negative.    Musculoskeletal: Negative.    Skin: Negative.    Neurological: Negative.    Psychiatric/Behavioral: Negative.    All other systems reviewed and are negative.      No past medical history on file.    Allergies   Allergen Reactions   • Contrast Dye Hives     Does okay with Benadryl before IV contrast per pt.       No past surgical history on file.    No family history on file.    Social History     Socioeconomic History   • Marital status: Single           Objective   Physical Exam  Constitutional:       Appearance: Normal appearance.   HENT:      Head: Normocephalic and atraumatic.      Mouth/Throat:      Mouth: Mucous membranes are moist.      Pharynx: Oropharynx is clear.   Eyes:      Conjunctiva/sclera: Conjunctivae normal.      Pupils: Pupils are equal, round, and reactive to light.   Cardiovascular:      Rate and Rhythm: Tachycardia present.      Heart sounds: Normal heart sounds.   Pulmonary:      Effort: Pulmonary effort is normal.      Breath sounds: Normal breath sounds.   Abdominal:      General: Bowel sounds are normal.      Palpations: Abdomen is soft.      Comments: Right-sided abdominal tenderness, no rebound or guarding   Musculoskeletal:         General: Normal range of motion.   Skin:     General: Skin is warm and dry.      Capillary Refill: Capillary refill takes less than 2 seconds.   Neurological:      General: No focal deficit present.      Mental Status: She is  alert.   Psychiatric:         Mood and Affect: Mood normal.         Behavior: Behavior normal.         Procedures           ED Course                                                 MDM  Number of Diagnoses or Management Options  Abdominal pain, unspecified abdominal location  Diagnosis management comments: Results for orders placed or performed during the hospital encounter of 04/13/22  -Comprehensive Metabolic Panel:   Specimen: Arm, Right; Blood       Result                      Value             Ref Range           Glucose                     90                65 - 99 mg/dL       BUN                         14                6 - 20 mg/dL        Creatinine                  0.87              0.57 - 1.00 *       Sodium                      139               136 - 145 mm*       Potassium                   4.3               3.5 - 5.2 mm*       Chloride                    103               98 - 107 mmo*       CO2                         23.0              22.0 - 29.0 *       Calcium                     9.9               8.6 - 10.5 m*       Total Protein               7.4               6.0 - 8.5 g/*       Albumin                     4.60              3.50 - 5.20 *       ALT (SGPT)                  27                1 - 33 U/L          AST (SGOT)                  21                1 - 32 U/L          Alkaline Phosphatase        98                39 - 117 U/L        Total Bilirubin             0.5               0.0 - 1.2 mg*       Globulin                    2.8               gm/dL               A/G Ratio                   1.6               g/dL                BUN/Creatinine Ratio        16.1              7.0 - 25.0          Anion Gap                   13.0              5.0 - 15.0 m*       eGFR                        90.3              >60.0 mL/min*  -Lipase:   Specimen: Arm, Right; Blood       Result                      Value             Ref Range           Lipase                      33                13 - 60 U/L     -Urinalysis With Culture If Indicated - Urine, Clean Catch:   Specimen: Urine, Clean Catch       Result                      Value             Ref Range           Color, UA                   Yellow            Yellow, Straw       Appearance, UA              Clear             Clear               pH, UA                      <=5.0             5.0 - 8.0           Specific Ettrick, UA        1.019             1.005 - 1.030       Glucose, UA                 Negative          Negative            Ketones, UA                 Negative          Negative            Bilirubin, UA               Negative          Negative            Blood, UA                   Negative          Negative            Protein, UA                 Negative          Negative            Leuk Esterase, UA           Trace (A)         Negative            Nitrite, UA                 Negative          Negative            Urobilinogen, UA            0.2 E.U./dL       0.2 - 1.0 E.*  -hCG, Serum, Qualitative:   Specimen: Blood       Result                      Value             Ref Range           HCG Qualitative             Negative          Negative       -CBC Auto Differential:   Specimen: Blood       Result                      Value             Ref Range           WBC                         6.90              3.40 - 10.80*       RBC                         4.04              3.77 - 5.28 *       Hemoglobin                  14.0              12.0 - 15.9 *       Hematocrit                  39.6              34.0 - 46.6 %       MCV                         98.2 (H)          79.0 - 97.0 *       MCH                         34.7 (H)          26.6 - 33.0 *       MCHC                        35.4              31.5 - 35.7 *       RDW                         13.2              12.3 - 15.4 %       RDW-SD                      45.9              37.0 - 54.0 *       MPV                         7.1               6.0 - 12.0 fL       Platelets                   258                140 - 450 10*       Neutrophil %                56.3              42.7 - 76.0 %       Lymphocyte %                31.8              19.6 - 45.3 %       Monocyte %                  10.9              5.0 - 12.0 %        Eosinophil %                0.6               0.3 - 6.2 %         Basophil %                  0.4               0.0 - 1.5 %         Neutrophils, Absolute       3.90              1.70 - 7.00 *       Lymphocytes, Absolute       2.20              0.70 - 3.10 *       Monocytes, Absolute         0.80              0.10 - 0.90 *       Eosinophils, Absolute       0.00              0.00 - 0.40 *       Basophils, Absolute         0.00              0.00 - 0.20 *       nRBC                        0.0               0.0 - 0.2 /1*  -Urinalysis, Microscopic Only - Urine, Clean Catch:   Specimen: Urine, Clean Catch       Result                      Value             Ref Range           RBC, UA                     0-2 (A)           None Seen /H*       WBC, UA                     0-2 (A)           None Seen /H*       Bacteria, UA                None Seen         None Seen /H*       Squamous Epithelial Ce*     0-2               None Seen, 0*       Hyaline Casts, UA           None Seen         None Seen /L*       Methodology                                                   Automated Microscopy  -Green Top (Gel):        Result                      Value             Ref Range           Extra Tube                                                    Hold for add-ons.  CT Abdomen Pelvis Without Contrast   Final Result         Negative CT examination as above. No evidence of acute process in the    abdomen/pelvis.         Electronically Signed By-Fabio Stephens On:4/13/2022 7:29 AM    This report was finalized on [id number] by  Fabio Stephens, .       Patient appears well, unremarkable work-up without any vomiting or diarrhea in the ED.  Patient [place] in [place] 2 hours from this hospital.  Inspect reviewed,  will prescribe brief course of hydrocodone which may help with the diarrhea.       Amount and/or Complexity of Data Reviewed  Clinical lab tests: ordered and reviewed  Tests in the radiology section of CPT®: ordered and reviewed  Tests in the medicine section of CPT®: reviewed and ordered        Final diagnoses:   Abdominal pain, unspecified abdominal location       ED Disposition  ED Disposition     ED Disposition   Discharge    Condition   Stable    Comment   --               Follow-up closely with your doctor             Medication List      New Prescriptions    HYDROcodone-acetaminophen 5-325 MG per tablet  Commonly known as: NORCO  Take 1 tablet by mouth Every 4 (Four) Hours As Needed for Moderate Pain .     ondansetron 4 MG tablet  Commonly known as: ZOFRAN  Take 1 tablet by mouth Every 8 (Eight) Hours As Needed for Nausea or Vomiting.           Where to Get Your Medications      These medications were sent to [facility]/pharmacy #[id number] - [place], [place] - [address] [place] [address] [phone] [place] [phone] [address] [place] [address]    Hours: 24-hours Phone: [phone]   · HYDROcodone-acetaminophen 5-325 MG per tablet  · ondansetron 4 MG tablet          Javier Hall MD  04/13/22 0742

## 2022-04-15 ENCOUNTER — HOSPITAL ENCOUNTER (EMERGENCY)
Facility: HOSPITAL | Age: 32
Discharge: HOME | End: 2022-04-15
Attending: EMERGENCY MEDICINE | Admitting: EMERGENCY MEDICINE

## 2022-04-15 VITALS
BODY MASS INDEX: 36.67 KG/M2 | SYSTOLIC BLOOD PRESSURE: 147 MMHG | OXYGEN SATURATION: 100 % | WEIGHT: 199.3 LBS | HEART RATE: 83 BPM | DIASTOLIC BLOOD PRESSURE: 96 MMHG | TEMPERATURE: 98.2 F | RESPIRATION RATE: 16 BRPM | HEIGHT: 62 IN

## 2022-04-15 DIAGNOSIS — R10.84 GENERALIZED ABDOMINAL PAIN: ICD-10-CM

## 2022-04-15 DIAGNOSIS — A04.72 C. DIFFICILE ENTERITIS: Primary | ICD-10-CM

## 2022-04-15 LAB
ALBUMIN SERPL-MCNC: 4.1 G/DL (ref 3.5–5.2)
ALBUMIN/GLOB SERPL: 1.3 G/DL
ALP SERPL-CCNC: 83 U/L (ref 39–117)
ALT SERPL W P-5'-P-CCNC: 25 U/L (ref 1–33)
AMPHET+METHAMPHET UR QL: NEGATIVE
ANION GAP SERPL CALCULATED.3IONS-SCNC: 11 MMOL/L (ref 5–15)
AST SERPL-CCNC: 25 U/L (ref 1–32)
B-HCG UR QL: NEGATIVE
BACTERIA UR QL AUTO: ABNORMAL /HPF
BARBITURATES UR QL SCN: NEGATIVE
BASOPHILS # BLD AUTO: 0 10*3/MM3 (ref 0–0.2)
BASOPHILS NFR BLD AUTO: 0.2 % (ref 0–1.5)
BENZODIAZ UR QL SCN: NEGATIVE
BILIRUB SERPL-MCNC: 0.3 MG/DL (ref 0–1.2)
BILIRUB UR QL STRIP: NEGATIVE
BUN SERPL-MCNC: 11 MG/DL (ref 6–20)
BUN/CREAT SERPL: 15.5 (ref 7–25)
CALCIUM SPEC-SCNC: 9.3 MG/DL (ref 8.6–10.5)
CANNABINOIDS SERPL QL: POSITIVE
CHLORIDE SERPL-SCNC: 103 MMOL/L (ref 98–107)
CLARITY UR: CLEAR
CO2 SERPL-SCNC: 23 MMOL/L (ref 22–29)
COCAINE UR QL: NEGATIVE
COLOR UR: YELLOW
CREAT SERPL-MCNC: 0.71 MG/DL (ref 0.57–1)
DEPRECATED RDW RBC AUTO: 44.2 FL (ref 37–54)
EGFRCR SERPLBLD CKD-EPI 2021: 115.3 ML/MIN/1.73
EOSINOPHIL # BLD AUTO: 0 10*3/MM3 (ref 0–0.4)
EOSINOPHIL NFR BLD AUTO: 0.3 % (ref 0.3–6.2)
ERYTHROCYTE [DISTWIDTH] IN BLOOD BY AUTOMATED COUNT: 12.9 % (ref 12.3–15.4)
ERYTHROCYTE [SEDIMENTATION RATE] IN BLOOD: 11 MM/HR (ref 0–20)
GLOBULIN UR ELPH-MCNC: 3.1 GM/DL
GLUCOSE SERPL-MCNC: 82 MG/DL (ref 65–99)
GLUCOSE UR STRIP-MCNC: NEGATIVE MG/DL
HCT VFR BLD AUTO: 42.3 % (ref 34–46.6)
HGB BLD-MCNC: 15.2 G/DL (ref 12–15.9)
HGB UR QL STRIP.AUTO: NEGATIVE
HYALINE CASTS UR QL AUTO: ABNORMAL /LPF
KETONES UR QL STRIP: ABNORMAL
LEUKOCYTE ESTERASE UR QL STRIP.AUTO: ABNORMAL
LIPASE SERPL-CCNC: 21 U/L (ref 13–60)
LYMPHOCYTES # BLD AUTO: 1.6 10*3/MM3 (ref 0.7–3.1)
LYMPHOCYTES NFR BLD AUTO: 23.8 % (ref 19.6–45.3)
MCH RBC QN AUTO: 36 PG (ref 26.6–33)
MCHC RBC AUTO-ENTMCNC: 36 G/DL (ref 31.5–35.7)
MCV RBC AUTO: 100 FL (ref 79–97)
METHADONE UR QL SCN: NEGATIVE
MONOCYTES # BLD AUTO: 0.6 10*3/MM3 (ref 0.1–0.9)
MONOCYTES NFR BLD AUTO: 9.7 % (ref 5–12)
NEUTROPHILS NFR BLD AUTO: 4.3 10*3/MM3 (ref 1.7–7)
NEUTROPHILS NFR BLD AUTO: 66 % (ref 42.7–76)
NITRITE UR QL STRIP: NEGATIVE
NRBC BLD AUTO-RTO: 0.1 /100 WBC (ref 0–0.2)
OPIATES UR QL: NEGATIVE
OXYCODONE UR QL SCN: NEGATIVE
PH UR STRIP.AUTO: 5.5 [PH] (ref 5–8)
PLATELET # BLD AUTO: 274 10*3/MM3 (ref 140–450)
PMV BLD AUTO: 7.5 FL (ref 6–12)
POTASSIUM SERPL-SCNC: 4.4 MMOL/L (ref 3.5–5.2)
PROT SERPL-MCNC: 7.2 G/DL (ref 6–8.5)
PROT UR QL STRIP: NEGATIVE
RBC # BLD AUTO: 4.23 10*6/MM3 (ref 3.77–5.28)
RBC # UR STRIP: ABNORMAL /HPF
REF LAB TEST METHOD: ABNORMAL
SODIUM SERPL-SCNC: 137 MMOL/L (ref 136–145)
SP GR UR STRIP: 1.02 (ref 1–1.03)
SQUAMOUS #/AREA URNS HPF: ABNORMAL /HPF
UROBILINOGEN UR QL STRIP: ABNORMAL
WBC # UR STRIP: ABNORMAL /HPF
WBC NRBC COR # BLD: 6.6 10*3/MM3 (ref 3.4–10.8)

## 2022-04-15 PROCEDURE — 85652 RBC SED RATE AUTOMATED: CPT | Performed by: EMERGENCY MEDICINE

## 2022-04-15 PROCEDURE — 83690 ASSAY OF LIPASE: CPT | Performed by: EMERGENCY MEDICINE

## 2022-04-15 PROCEDURE — 36415 COLL VENOUS BLD VENIPUNCTURE: CPT

## 2022-04-15 PROCEDURE — 80307 DRUG TEST PRSMV CHEM ANLYZR: CPT | Performed by: EMERGENCY MEDICINE

## 2022-04-15 PROCEDURE — 96374 THER/PROPH/DIAG INJ IV PUSH: CPT

## 2022-04-15 PROCEDURE — 87324 CLOSTRIDIUM AG IA: CPT | Performed by: EMERGENCY MEDICINE

## 2022-04-15 PROCEDURE — 81001 URINALYSIS AUTO W/SCOPE: CPT | Performed by: EMERGENCY MEDICINE

## 2022-04-15 PROCEDURE — 80053 COMPREHEN METABOLIC PANEL: CPT | Performed by: EMERGENCY MEDICINE

## 2022-04-15 PROCEDURE — 25010000002 DIPHENHYDRAMINE PER 50 MG: Performed by: EMERGENCY MEDICINE

## 2022-04-15 PROCEDURE — 85025 COMPLETE CBC W/AUTO DIFF WBC: CPT | Performed by: EMERGENCY MEDICINE

## 2022-04-15 PROCEDURE — 25010000002 PROCHLORPERAZINE 10 MG/2ML SOLUTION: Performed by: EMERGENCY MEDICINE

## 2022-04-15 PROCEDURE — 99283 EMERGENCY DEPT VISIT LOW MDM: CPT

## 2022-04-15 PROCEDURE — 96375 TX/PRO/DX INJ NEW DRUG ADDON: CPT

## 2022-04-15 PROCEDURE — 81025 URINE PREGNANCY TEST: CPT | Performed by: EMERGENCY MEDICINE

## 2022-04-15 PROCEDURE — 87449 NOS EACH ORGANISM AG IA: CPT | Performed by: EMERGENCY MEDICINE

## 2022-04-15 RX ORDER — PROCHLORPERAZINE MALEATE 10 MG
10 TABLET ORAL EVERY 6 HOURS PRN
Qty: 12 TABLET | Refills: 0 | Status: SHIPPED | OUTPATIENT
Start: 2022-04-15

## 2022-04-15 RX ORDER — VANCOMYCIN HYDROCHLORIDE 125 MG/1
125 CAPSULE ORAL
COMMUNITY
Start: 2022-04-06 | End: 2022-04-16

## 2022-04-15 RX ORDER — OMEPRAZOLE 40 MG/1
40 CAPSULE, DELAYED RELEASE ORAL DAILY
Qty: 30 CAPSULE | Refills: 0 | Status: SHIPPED | OUTPATIENT
Start: 2022-04-15

## 2022-04-15 RX ORDER — DIPHENHYDRAMINE HYDROCHLORIDE 50 MG/ML
25 INJECTION INTRAMUSCULAR; INTRAVENOUS ONCE
Status: DISCONTINUED | OUTPATIENT
Start: 2022-04-15 | End: 2022-04-15

## 2022-04-15 RX ORDER — PROCHLORPERAZINE EDISYLATE 5 MG/ML
10 INJECTION INTRAMUSCULAR; INTRAVENOUS ONCE
Status: COMPLETED | OUTPATIENT
Start: 2022-04-15 | End: 2022-04-15

## 2022-04-15 RX ORDER — DICYCLOMINE HYDROCHLORIDE 10 MG/ML
20 INJECTION INTRAMUSCULAR ONCE
Status: DISCONTINUED | OUTPATIENT
Start: 2022-04-15 | End: 2022-04-15 | Stop reason: HOSPADM

## 2022-04-15 RX ORDER — PROCHLORPERAZINE EDISYLATE 5 MG/ML
10 INJECTION INTRAMUSCULAR; INTRAVENOUS ONCE
Status: DISCONTINUED | OUTPATIENT
Start: 2022-04-15 | End: 2022-04-15

## 2022-04-15 RX ORDER — DIPHENHYDRAMINE HYDROCHLORIDE 50 MG/ML
25 INJECTION INTRAMUSCULAR; INTRAVENOUS ONCE
Status: COMPLETED | OUTPATIENT
Start: 2022-04-15 | End: 2022-04-15

## 2022-04-15 RX ORDER — DICYCLOMINE HCL 20 MG
20 TABLET ORAL EVERY 8 HOURS PRN
Qty: 10 TABLET | Refills: 0 | OUTPATIENT
Start: 2022-04-15 | End: 2022-04-19

## 2022-04-15 RX ADMIN — DIPHENHYDRAMINE HYDROCHLORIDE 25 MG: 50 INJECTION, SOLUTION INTRAMUSCULAR; INTRAVENOUS at 17:55

## 2022-04-15 RX ADMIN — PROCHLORPERAZINE EDISYLATE 10 MG: 5 INJECTION INTRAMUSCULAR; INTRAVENOUS at 17:55

## 2022-04-16 LAB — C DIFF GDH STL QL: NEGATIVE

## 2022-04-16 NOTE — ED PROVIDER NOTES
Subjective   32-year-old female with history of hearing impairment presents complaining of abdominal pain.  She reports she was recently diagnosed as having C. difficile in [place].  She was seen in the emergency department previously for abdominal pain.  She states that she continues to have abdominal pain.  It is apparently improved somewhat.  The patient reports that she was given pain medicine and is out of this medication.  The patient reports that that she has had improvement in diarrhea.  She reports that she has not had fever chills in the last 24 hours.  She states is vomited 4 times.  She states that generally she gets IV Phenergan and specifically asked for hydromorphone for pain.  Patient reports no melena hematemesis hematochezia.  She denies dysuria or hematuria.          Review of Systems   HENT: Negative for hearing loss, nosebleeds, sore throat and trouble swallowing.    Eyes: Negative for discharge.   Respiratory: Negative for chest tightness and shortness of breath.    Cardiovascular: Negative for chest pain and palpitations.   Gastrointestinal: Positive for abdominal pain, diarrhea, nausea and vomiting. Negative for abdominal distention, anal bleeding, blood in stool and rectal pain.   Genitourinary: Negative for decreased urine volume, difficulty urinating, flank pain, vaginal bleeding and vaginal discharge.   Skin: Negative for rash.   Allergic/Immunologic: Negative for food allergies.   Neurological: Negative for facial asymmetry and headaches.   Hematological: Does not bruise/bleed easily.   All other systems reviewed and are negative.      No past medical history on file.  We were able to obtain limited records from [facility] in [place].  The patient has history of asthma, Crohn's disease[patient states she is supposed to be taking Pentasa but cannot take it because it is too expensive], fibromyalgia, seizures.  Review of the patient's chart revealed multiple Suboxone  prescriptions as well as naloxone prescription however the patient denied that she had ever been prescribed these medications  Allergies   Allergen Reactions   • Contrast Dye Hives     Does okay with Benadryl before IV contrast per pt.       No past surgical history on file.  The patient has history of previous cholecystectomy  No family history on file.  Family history is positive for congestive heart failure and Crohn's disease  Social History     Socioeconomic History   • Marital status: Single     Patient denies alcohol or drug use.  She is a smoker    Objective   Physical Exam  Alert Leona Coma Scale 15 examined with the assistance of ASL the iPad video/audio   HEENT: Pupils equal and reactive to light. Conjunctivae are not injected. normal tympanic membranes. Oropharynx and nares are normal.   Neck: Supple. Midline trachea. No JVD. No goiter.   Chest: Clear and equal breath sounds bilaterally regular rate and rhythm without murmur or rub.   Abdomen: Positive bowel sounds discomfort noted in the left lower quadrant there is no referred pain to that area there is no distention there is no rebound or peritoneal signs n. No rebound or peritoneal signs. No CVA tenderness.   Extremities no clubbing cyanosis or edema motor sensory exam is normal the full range of motion is intact   skin: Warm and dry, no rashes or petechia.   Lymphatic: No regional lymphadenopathy. No calf pain, swelling or Sudeep's sign    Procedures           ED Course           Labs Reviewed   URINE DRUG SCREEN - Abnormal; Notable for the following components:       Result Value    THC, Screen, Urine Positive (*)     All other components within normal limits    Narrative:     Negative Thresholds Per Drugs Screened:    Amphetamines                 500 ng/ml  Barbiturates                 200 ng/ml  Benzodiazepines              100 ng/ml  Cocaine                      300 ng/ml  Methadone                    300 ng/ml  Opiates                      300  ng/ml  Oxycodone                    100 ng/ml  THC                           50 ng/ml    The Normal Value for all drugs tested is negative. This report includes final unconfirmed screening results to be used for medical treatment purposes only. Unconfirmed results must not be used for non-medical purposes such as employment or legal testing. Clinical consideration should be applied to any drug of abuse test, particularly when unconfirmed results are used.          All urine drugs of abuse requests without chain of custody are for medical screening purposes only.  False positives are possible.     URINALYSIS W/ CULTURE IF INDICATED - Abnormal; Notable for the following components:    Ketones, UA Trace (*)     Leuk Esterase, UA Trace (*)     All other components within normal limits   CBC WITH AUTO DIFFERENTIAL - Abnormal; Notable for the following components:    .0 (*)     MCH 36.0 (*)     MCHC 36.0 (*)     All other components within normal limits   URINALYSIS, MICROSCOPIC ONLY - Abnormal; Notable for the following components:    RBC, UA 0-2 (*)     WBC, UA 0-2 (*)     Squamous Epithelial Cells, UA 7-12 (*)     All other components within normal limits   CLOSTRIDIUM DIFFICILE EIA - Normal   SEDIMENTATION RATE - Normal   LIPASE - Normal   PREGNANCY, URINE - Normal   CLOSTRIDIUM DIFFICILE TOXIN    Narrative:     The following orders were created for panel order Clostridium Difficile Toxin - Stool, Per Rectum.  Procedure                               Abnormality         Status                     ---------                               -----------         ------                     Clostridium Difficile EI...[[id number]]  Normal              Final result                 Please view results for these tests on the individual orders.   COMPREHENSIVE METABOLIC PANEL    Narrative:     GFR Normal >60  Chronic Kidney Disease <60  Kidney Failure <15     CBC AND DIFFERENTIAL    Narrative:     The following orders were  created for panel order CBC & Differential.  Procedure                               Abnormality         Status                     ---------                               -----------         ------                     CBC Auto Differential[[id number]]        Abnormal            Final result                 Please view results for these tests on the individual orders.     Medications   diphenhydrAMINE (BENADRYL) injection 25 mg (25 mg Intravenous Given 4/15/22 1755)   prochlorperazine (COMPAZINE) injection 10 mg (10 mg Intravenous Given 4/15/22 1755)     No radiology results for the last day                                        MDM  Number of Diagnoses or Management Options  Risk of Complications, Morbidity, and/or Mortality  Presenting problems: high  Diagnostic procedures: high  Management options: high  General comments: Spite reported multiple diarrhea episodes the patient was unable to provide a stool specimen while in the emergency department.  She repetitively requested opioids and specifically requested Dilaudid/hydromorphone.  The patient had apparent relief with nonnarcotic interventions.  She requested a Compazine prescription.  The patient will be discharged she was advised to finish the vancomycin as directed she will also be placed on omeprazole 40 mg daily.  The patient was stable at discharge and vocalized understanding of discharge instruction warnings she was given the alternative of following up with the gastroenterologist locally or in [place].  The patient had denied substance use but had a drug screen positive for marijuana metabolites.  I cannot conclusively exclude a component of cannabis hyperemesis syndrome in this case, although the patient's reported history would make this less likely, if accurate.        Final diagnoses:   C. difficile enteritis   Generalized abdominal pain       ED Disposition  ED Disposition     ED Disposition   Discharge    Condition   Stable    Comment   --              No follow-up provider specified.       Medication List      New Prescriptions    omeprazole 40 MG capsule  Commonly known as: priLOSEC  Take 1 capsule by mouth Daily.     prochlorperazine 10 MG tablet  Commonly known as: COMPAZINE  Take 1 tablet by mouth Every 6 (Six) Hours As Needed for Nausea.        ASK your doctor about these medications    vancomycin 125 MG capsule  Commonly known as: VANCOCIN  Ask about: Should I take this medication?           Where to Get Your Medications      These medications were sent to [facility]/pharmacy #[id number] - [place], [place] - [address] [place] [address] [phone] [place] [phone]   [address] [facility] [place] [address]    Hours: 24-hours Phone: [phone]   · omeprazole 40 MG capsule  · prochlorperazine 10 MG tablet          Didier Beltran MD  04/21/22 0907       Didier Beltran MD  04/23/22 6434

## 2022-04-19 ENCOUNTER — APPOINTMENT (OUTPATIENT)
Dept: GENERAL RADIOLOGY | Facility: HOSPITAL | Age: 32
End: 2022-04-19

## 2022-04-19 ENCOUNTER — HOSPITAL ENCOUNTER (EMERGENCY)
Facility: HOSPITAL | Age: 32
Discharge: HOME | End: 2022-04-19
Attending: EMERGENCY MEDICINE | Admitting: EMERGENCY MEDICINE

## 2022-04-19 VITALS
HEIGHT: 62 IN | OXYGEN SATURATION: 95 % | WEIGHT: 201.06 LBS | TEMPERATURE: 98.7 F | BODY MASS INDEX: 37 KG/M2 | SYSTOLIC BLOOD PRESSURE: 143 MMHG | RESPIRATION RATE: 16 BRPM | HEART RATE: 112 BPM | DIASTOLIC BLOOD PRESSURE: 84 MMHG

## 2022-04-19 DIAGNOSIS — R11.10 VOMITING, UNSPECIFIED VOMITING TYPE, UNSPECIFIED WHETHER NAUSEA PRESENT: ICD-10-CM

## 2022-04-19 DIAGNOSIS — J06.9 UPPER RESPIRATORY TRACT INFECTION, UNSPECIFIED TYPE: Primary | ICD-10-CM

## 2022-04-19 DIAGNOSIS — R50.9 FEVER, UNSPECIFIED FEVER CAUSE: ICD-10-CM

## 2022-04-19 DIAGNOSIS — R19.7 DIARRHEA, UNSPECIFIED TYPE: ICD-10-CM

## 2022-04-19 DIAGNOSIS — R10.9 ABDOMINAL PAIN, UNSPECIFIED ABDOMINAL LOCATION: ICD-10-CM

## 2022-04-19 LAB
ALBUMIN SERPL-MCNC: 4.3 G/DL (ref 3.5–5.2)
ALBUMIN/GLOB SERPL: 1.3 G/DL
ALP SERPL-CCNC: 84 U/L (ref 39–117)
ALT SERPL W P-5'-P-CCNC: 30 U/L (ref 1–33)
ANION GAP SERPL CALCULATED.3IONS-SCNC: 16 MMOL/L (ref 5–15)
AST SERPL-CCNC: 34 U/L (ref 1–32)
B PARAPERT DNA SPEC QL NAA+PROBE: NOT DETECTED
B PERT DNA SPEC QL NAA+PROBE: NOT DETECTED
B-HCG UR QL: NEGATIVE
BASOPHILS # BLD AUTO: 0 10*3/MM3 (ref 0–0.2)
BASOPHILS NFR BLD AUTO: 0.1 % (ref 0–1.5)
BILIRUB SERPL-MCNC: 0.3 MG/DL (ref 0–1.2)
BILIRUB UR QL STRIP: NEGATIVE
BUN SERPL-MCNC: 9 MG/DL (ref 6–20)
BUN/CREAT SERPL: 13.4 (ref 7–25)
C PNEUM DNA NPH QL NAA+NON-PROBE: NOT DETECTED
CALCIUM SPEC-SCNC: 9.9 MG/DL (ref 8.6–10.5)
CHLORIDE SERPL-SCNC: 100 MMOL/L (ref 98–107)
CLARITY UR: CLEAR
CO2 SERPL-SCNC: 19 MMOL/L (ref 22–29)
COLOR UR: YELLOW
CREAT SERPL-MCNC: 0.67 MG/DL (ref 0.57–1)
DEPRECATED RDW RBC AUTO: 47.3 FL (ref 37–54)
EGFRCR SERPLBLD CKD-EPI 2021: 118.5 ML/MIN/1.73
EOSINOPHIL # BLD AUTO: 0 10*3/MM3 (ref 0–0.4)
EOSINOPHIL NFR BLD AUTO: 0.6 % (ref 0.3–6.2)
ERYTHROCYTE [DISTWIDTH] IN BLOOD BY AUTOMATED COUNT: 13.5 % (ref 12.3–15.4)
FLUAV SUBTYP SPEC NAA+PROBE: NOT DETECTED
FLUBV RNA ISLT QL NAA+PROBE: NOT DETECTED
GLOBULIN UR ELPH-MCNC: 3.3 GM/DL
GLUCOSE SERPL-MCNC: 89 MG/DL (ref 65–99)
GLUCOSE UR STRIP-MCNC: NEGATIVE MG/DL
HADV DNA SPEC NAA+PROBE: NOT DETECTED
HCOV 229E RNA SPEC QL NAA+PROBE: NOT DETECTED
HCOV HKU1 RNA SPEC QL NAA+PROBE: NOT DETECTED
HCOV NL63 RNA SPEC QL NAA+PROBE: NOT DETECTED
HCOV OC43 RNA SPEC QL NAA+PROBE: DETECTED
HCT VFR BLD AUTO: 44.5 % (ref 34–46.6)
HGB BLD-MCNC: 15 G/DL (ref 12–15.9)
HGB UR QL STRIP.AUTO: NEGATIVE
HMPV RNA NPH QL NAA+NON-PROBE: NOT DETECTED
HPIV1 RNA ISLT QL NAA+PROBE: NOT DETECTED
HPIV2 RNA SPEC QL NAA+PROBE: NOT DETECTED
HPIV3 RNA NPH QL NAA+PROBE: NOT DETECTED
HPIV4 P GENE NPH QL NAA+PROBE: NOT DETECTED
KETONES UR QL STRIP: NEGATIVE
LEUKOCYTE ESTERASE UR QL STRIP.AUTO: NEGATIVE
LYMPHOCYTES # BLD AUTO: 0.4 10*3/MM3 (ref 0.7–3.1)
LYMPHOCYTES NFR BLD AUTO: 7.6 % (ref 19.6–45.3)
M PNEUMO IGG SER IA-ACNC: NOT DETECTED
MAGNESIUM SERPL-MCNC: 1.5 MG/DL (ref 1.6–2.6)
MCH RBC QN AUTO: 34 PG (ref 26.6–33)
MCHC RBC AUTO-ENTMCNC: 33.7 G/DL (ref 31.5–35.7)
MCV RBC AUTO: 100.9 FL (ref 79–97)
MONOCYTES # BLD AUTO: 0.6 10*3/MM3 (ref 0.1–0.9)
MONOCYTES NFR BLD AUTO: 11.6 % (ref 5–12)
NEUTROPHILS NFR BLD AUTO: 4.5 10*3/MM3 (ref 1.7–7)
NEUTROPHILS NFR BLD AUTO: 80.1 % (ref 42.7–76)
NITRITE UR QL STRIP: NEGATIVE
NRBC BLD AUTO-RTO: 0.2 /100 WBC (ref 0–0.2)
PH UR STRIP.AUTO: 6.5 [PH] (ref 5–8)
PLATELET # BLD AUTO: 247 10*3/MM3 (ref 140–450)
PMV BLD AUTO: 7.6 FL (ref 6–12)
POTASSIUM SERPL-SCNC: 4.6 MMOL/L (ref 3.5–5.2)
PROCALCITONIN SERPL-MCNC: 0.06 NG/ML (ref 0–0.25)
PROT SERPL-MCNC: 7.6 G/DL (ref 6–8.5)
PROT UR QL STRIP: NEGATIVE
RBC # BLD AUTO: 4.41 10*6/MM3 (ref 3.77–5.28)
RHINOVIRUS RNA SPEC NAA+PROBE: NOT DETECTED
RSV RNA NPH QL NAA+NON-PROBE: NOT DETECTED
S PYO AG THROAT QL: NEGATIVE
SARS-COV-2 RNA NPH QL NAA+NON-PROBE: NOT DETECTED
SODIUM SERPL-SCNC: 135 MMOL/L (ref 136–145)
SP GR UR STRIP: 1.01 (ref 1–1.03)
UROBILINOGEN UR QL STRIP: NORMAL
WBC NRBC COR # BLD: 5.6 10*3/MM3 (ref 3.4–10.8)

## 2022-04-19 PROCEDURE — 81025 URINE PREGNANCY TEST: CPT | Performed by: NURSE PRACTITIONER

## 2022-04-19 PROCEDURE — 85025 COMPLETE CBC W/AUTO DIFF WBC: CPT | Performed by: NURSE PRACTITIONER

## 2022-04-19 PROCEDURE — 99283 EMERGENCY DEPT VISIT LOW MDM: CPT

## 2022-04-19 PROCEDURE — 83735 ASSAY OF MAGNESIUM: CPT | Performed by: NURSE PRACTITIONER

## 2022-04-19 PROCEDURE — 72072 X-RAY EXAM THORAC SPINE 3VWS: CPT

## 2022-04-19 PROCEDURE — 96374 THER/PROPH/DIAG INJ IV PUSH: CPT

## 2022-04-19 PROCEDURE — 0202U NFCT DS 22 TRGT SARS-COV-2: CPT | Performed by: NURSE PRACTITIONER

## 2022-04-19 PROCEDURE — 25010000002 DIPHENHYDRAMINE PER 50 MG: Performed by: NURSE PRACTITIONER

## 2022-04-19 PROCEDURE — 96375 TX/PRO/DX INJ NEW DRUG ADDON: CPT

## 2022-04-19 PROCEDURE — 84145 PROCALCITONIN (PCT): CPT | Performed by: NURSE PRACTITIONER

## 2022-04-19 PROCEDURE — 87651 STREP A DNA AMP PROBE: CPT | Performed by: NURSE PRACTITIONER

## 2022-04-19 PROCEDURE — 81003 URINALYSIS AUTO W/O SCOPE: CPT | Performed by: NURSE PRACTITIONER

## 2022-04-19 PROCEDURE — 80053 COMPREHEN METABOLIC PANEL: CPT | Performed by: NURSE PRACTITIONER

## 2022-04-19 PROCEDURE — 25010000002 PROCHLORPERAZINE 10 MG/2ML SOLUTION: Performed by: NURSE PRACTITIONER

## 2022-04-19 PROCEDURE — 71045 X-RAY EXAM CHEST 1 VIEW: CPT

## 2022-04-19 PROCEDURE — 36415 COLL VENOUS BLD VENIPUNCTURE: CPT

## 2022-04-19 RX ORDER — ONDANSETRON 4 MG/1
4 TABLET, ORALLY DISINTEGRATING ORAL EVERY 8 HOURS PRN
Qty: 20 TABLET | Refills: 0 | Status: SHIPPED | OUTPATIENT
Start: 2022-04-19

## 2022-04-19 RX ORDER — DICYCLOMINE HCL 20 MG
20 TABLET ORAL EVERY 6 HOURS
Qty: 20 TABLET | Refills: 0 | Status: SHIPPED | OUTPATIENT
Start: 2022-04-19

## 2022-04-19 RX ORDER — PROCHLORPERAZINE EDISYLATE 5 MG/ML
5 INJECTION INTRAMUSCULAR; INTRAVENOUS ONCE
Status: COMPLETED | OUTPATIENT
Start: 2022-04-19 | End: 2022-04-19

## 2022-04-19 RX ORDER — DICYCLOMINE HYDROCHLORIDE 10 MG/ML
20 INJECTION INTRAMUSCULAR ONCE
Status: DISCONTINUED | OUTPATIENT
Start: 2022-04-19 | End: 2022-04-19 | Stop reason: HOSPADM

## 2022-04-19 RX ORDER — SODIUM CHLORIDE 0.9 % (FLUSH) 0.9 %
10 SYRINGE (ML) INJECTION AS NEEDED
Status: DISCONTINUED | OUTPATIENT
Start: 2022-04-19 | End: 2022-04-19 | Stop reason: HOSPADM

## 2022-04-19 RX ORDER — DIPHENHYDRAMINE HYDROCHLORIDE 50 MG/ML
25 INJECTION INTRAMUSCULAR; INTRAVENOUS ONCE
Status: COMPLETED | OUTPATIENT
Start: 2022-04-19 | End: 2022-04-19

## 2022-04-19 RX ORDER — ACETAMINOPHEN 325 MG/1
650 TABLET ORAL EVERY 6 HOURS PRN
Status: DISCONTINUED | OUTPATIENT
Start: 2022-04-19 | End: 2022-04-19 | Stop reason: HOSPADM

## 2022-04-19 RX ADMIN — PROCHLORPERAZINE EDISYLATE 5 MG: 5 INJECTION INTRAMUSCULAR; INTRAVENOUS at 11:44

## 2022-04-19 RX ADMIN — SODIUM CHLORIDE, POTASSIUM CHLORIDE, SODIUM LACTATE AND CALCIUM CHLORIDE 1000 ML: 600; 310; 30; 20 INJECTION, SOLUTION INTRAVENOUS at 11:43

## 2022-04-19 RX ADMIN — DIPHENHYDRAMINE HYDROCHLORIDE 25 MG: 50 INJECTION, SOLUTION INTRAMUSCULAR; INTRAVENOUS at 11:44

## 2022-04-19 RX ADMIN — ACETAMINOPHEN 650 MG: 325 TABLET ORAL at 14:08

## 2022-04-19 NOTE — DISCHARGE INSTRUCTIONS
Establish care with a gastroenterologist.  medications as directed. plenty of fluids. Tylenol as needed.  Follow up with your family doctor next week.  Return for any new or worsening symptoms. May use warm saltwater gargles or Chloraseptic spray throat lozenges for sore throat. May use saline rinses or neti pots for congestion.  Clear liquids for the next 24 hours.  Gradually increase your diet as tolerated.  No milk products for 48 hours.  If more than 8-10 episodes of diarrhea per day use over-the-counter Imodium.  Follow up with your family doctor next week.  Return for any new or worsening symptoms    Use over-the-counter Coricidin for your nasal congestion.    Follow-up pharmacy precautions and warnings regarding your prescriptions

## 2022-04-19 NOTE — ED PROVIDER NOTES
"Subjective   Chief complaint: Abdominal pain      Context: Patient is a 32-year-old female who comes in complaining abdominal pain nausea vomiting diarrhea fever cough congestion and sore throat.  She states she had pneumonia in January and was started on Flagyl April 6 in [place] for C. difficile which she states she has completed.  She states she does have friends with similar upper respiratory ill contacts.  She states fever T-max at home was 101 although is improving with Tylenol.  She states she continues to have approximately 6 episodes of diarrhea per day and several episodes of vomiting.  States she has had her initial 2 COVID vaccines and flu vaccine.  Has had some dysuria without concerns noted for STD.  She states she is not currently seeing a gastroenterologist for her diagnosis of Crohn's because they \"did not have an ;\" she states she is from [place] and her boyfriend lives down here in  and denies the need for GI referral in this area.  Patient also states she feels like she has a \"lump\" on her midline back where she is apparently had prior surgery, no injuries.  No reported bowel or bladder incontinence retention paresthesias or weakness.  No hx ivda    Location: Generalized abdomen  Duration: Weeks  Timing: Waxes and wanes  Quality/Severity: Moderate  Modifying factors:  Associated symptoms: Nausea vomiting diarrhea        Additional hx provided by: Self    PCP:                   Review of Systems   Constitutional: Positive for fever.   HENT: Positive for congestion, hearing loss and sore throat.         Patient is deaf   Respiratory: Positive for cough. Negative for shortness of breath.    Cardiovascular: Negative.    Gastrointestinal: Positive for abdominal pain, diarrhea, nausea and vomiting. Negative for blood in stool.   Genitourinary: Positive for dysuria.   Skin: Negative.    Allergic/Immunologic: Negative for immunocompromised state.   Neurological: Negative for syncope. " "  Hematological: Does not bruise/bleed easily.   Psychiatric/Behavioral: Negative for confusion.       Past medical history significant for Crohn's    RX: cymbalta, singulair, abilify, proair    Allergies   Allergen Reactions   • Contrast Dye Hives     Does okay with Benadryl before IV contrast per pt.       No past surgical history on file.    No family history on file.    Social History     Socioeconomic History   • Marital status: Single           Objective   Physical Exam     Vital signs and triage nurse note reviewed.   PT is deaf and  (Tammie) used via ipad.   Constitutional: Awake, alert; nontoxic in appearance  HEENT: Normocephalic, atraumatic; pupils are PERRL with intact EOM; oropharynx is pink and moist without exudate or erythema.  Drainage noted in the posterior oropharynx  Neck: Supple, full range of motion without pain; no cervical lymphadenopathy.   Cardiovascular: Regular rate and rhythm, normal S1-S2.   Pulmonary: Respiratory effort regular nonlabored, breath sounds clear to auscultation all fields.   Abdomen: Soft, generalized abd pain, no peritoneal rigidity , nondistended with normoactive bowel sounds; no rebound or guarding.   Musculoskeletal: Independent range of motion of all extremities with no palpable tenderness or edema.  No pain over the midline C or L-spine.  Patient palpates a \"lump\" over her mid to lower T-spine underlying prior old surgical incisions that is always there but seems bigger. No induration fluctuation or trauma noted.    Neuro: Alert   Skin:  Fleshtone warm, dry, intact; no erythematous or petechial rash or lesion       Procedures           ED Course  ED Course as of 04/19/22 1516   Tue Apr 19, 2022   1115 Waiting on labs to be collected [JW]   1257 Requesting pain medication per nurse [JW]      ED Course User Index  [JW] Layne Coffey, APRN           Labs Reviewed   RESPIRATORY PANEL PCR W/ COVID-19 (SARS-COV-2) [facility] IN-HOUSE, NP " SWAB IN UTM/VTP, 3-4 HR TAT - Abnormal; Notable for the following components:       Result Value    Coronavirus OC43 Detected (*)     All other components within normal limits    Narrative:     In the setting of a positive respiratory panel with a viral infection PLUS a negative procalcitonin without other underlying concern for bacterial infection, consider observing off antibiotics or discontinuation of antibiotics and continue supportive care. If the respiratory panel is positive for atypical bacterial infection (Bordetella pertussis, Chlamydophila pneumoniae, or Mycoplasma pneumoniae), consider antibiotic de-escalation to target atypical bacterial infection.   COMPREHENSIVE METABOLIC PANEL - Abnormal; Notable for the following components:    Sodium 135 (*)     CO2 19.0 (*)     AST (SGOT) 34 (*)     Anion Gap 16.0 (*)     All other components within normal limits    Narrative:     GFR Normal >60  Chronic Kidney Disease <60  Kidney Failure <15     MAGNESIUM - Abnormal; Notable for the following components:    Magnesium 1.5 (*)     All other components within normal limits   CBC WITH AUTO DIFFERENTIAL - Abnormal; Notable for the following components:    .9 (*)     MCH 34.0 (*)     Neutrophil % 80.1 (*)     Lymphocyte % 7.6 (*)     Lymphocytes, Absolute 0.40 (*)     All other components within normal limits   RAPID STREP A SCREEN - Normal   URINALYSIS W/ CULTURE IF INDICATED - Normal    Narrative:     Urine microscopic not indicated.   PROCALCITONIN - Normal    Narrative:     As a Marker for Sepsis (Non-Neonates):    1. <0.5 ng/mL represents a low risk of severe sepsis and/or septic shock.  2. >2 ng/mL represents a high risk of severe sepsis and/or septic shock.    As a Marker for Lower Respiratory Tract Infections that require antibiotic therapy:    PCT on Admission    Antibiotic Therapy       6-12 Hrs later    >0.5                Strongly Recommended  >0.25 - <0.5        Recommended   0.1 - 0.25           "Discouraged              Remeasure/reassess PCT  <0.1                Strongly Discouraged     Remeasure/reassess PCT    As 28 day mortality risk marker: \"Change in Procalcitonin Result\" (>80% or <=80%) if Day 0 (or Day 1) and Day 4 values are available. Refer to http://[email or web address]-pct-[email or web address]    Change in PCT <=80%  A decrease of PCT levels below or equal to 80% defines a positive change in PCT test result representing a higher risk for 28-day all-cause mortality of patients diagnosed with severe sepsis for septic shock.    Change in PCT >80%  A decrease of PCT levels of more than 80% defines a negative change in PCT result representing a lower risk for 28-day all-cause mortality of patients diagnosed with severe sepsis or septic shock.      PREGNANCY, URINE - Normal   CLOSTRIDIUM DIFFICILE TOXIN    Narrative:     The following orders were created for panel order Clostridium Difficile Toxin - Stool, Per Rectum.  Procedure                               Abnormality         Status                     ---------                               -----------         ------                     Clostridium Difficile EI...[[id number]]                                                   Please view results for these tests on the individual orders.   CLOSTRIDIUM DIFFICILE EIA   CBC AND DIFFERENTIAL    Narrative:     The following orders were created for panel order CBC & Differential.  Procedure                               Abnormality         Status                     ---------                               -----------         ------                     CBC Auto Differential[[id number]]        Abnormal            Final result                 Please view results for these tests on the individual orders.     Medications   sodium chloride 0.9 % flush 10 mL (has no administration in time range)   dicyclomine (BENTYL) injection 20 mg (20 mg Intramuscular Not Given 4/19/22 7547)   acetaminophen (TYLENOL) tablet 650 mg (650 mg Oral " Given 4/19/22 1408)   lactated ringers bolus 1,000 mL (0 mL Intravenous Stopped 4/19/22 1416)   prochlorperazine (COMPAZINE) injection 5 mg (5 mg Intravenous Given 4/19/22 1144)   diphenhydrAMINE (BENADRYL) injection 25 mg (25 mg Intravenous Given 4/19/22 1144)     XR Spine Thoracic 3 View    Result Date: 4/19/2022  1.No evidence for acute fracture or subluxation. 2.Spinal electrodes enter the central canal the posterior aspect of the T12/L1 level. The distal tip of the electrodes are positioned overlying the T8 vertebral level.  Electronically Signed By-Tomas Lamb MD On:4/19/2022 12:23 PM This report was finalized on [id number] by  Tomas Lamb MD.    XR Chest AP    Result Date: 4/19/2022  Subtle patchy ill-defined infiltrates bilaterally with associated interstitial changes. The findings suggest developing atypical/viral infection or multifocal pneumonia and suggest potential changes of Covid 19 infection. Recommend correlation for signs or symptoms of acute infection and follow-up to ensure improvement/resolution.  Electronically Signed By-Tomas Lamb MD On:4/19/2022 10:57 AM This report was finalized on [id number] by  Tomas Lamb MD.    Prior to Admission medications    Medication Sig Start Date End Date Taking? Authorizing Provider   dicyclomine (BENTYL) 20 MG tablet Take 1 tablet by mouth Every 6 (Six) Hours. 4/19/22   Layne Coffey APRN   HYDROcodone-acetaminophen (NORCO) 5-325 MG per tablet Take 1 tablet by mouth Every 4 (Four) Hours As Needed for Moderate Pain . 4/13/22   Javier Hall MD   omeprazole (priLOSEC) 40 MG capsule Take 1 capsule by mouth Daily. 4/15/22   Didier Beltran MD   ondansetron ODT (ZOFRAN-ODT) 4 MG disintegrating tablet Place 1 tablet on the tongue Every 8 (Eight) Hours As Needed for Nausea or Vomiting. 4/19/22   Layne Coffey APRN   prochlorperazine (COMPAZINE) 10 MG tablet Take 1 tablet by mouth Every 6 (Six) Hours As Needed for Nausea. 4/15/22    "Didier Beltran MD   dicyclomine (BENTYL) 20 MG tablet Take 1 tablet by mouth Every 8 (Eight) Hours As Needed (Abdominal cramping and pain). 4/15/22 4/19/22  Didier Beltran MD   ondansetron (ZOFRAN) 4 MG tablet Take 1 tablet by mouth Every 8 (Eight) Hours As Needed for Nausea or Vomiting. 4/13/22 4/19/22  Javier Hall MD                                           [facility]  Number of Diagnoses or Management Options  Abdominal pain, unspecified abdominal location  Diarrhea, unspecified type  Fever, unspecified fever cause  Upper respiratory tract infection, unspecified type  Vomiting, unspecified vomiting type, unspecified whether nausea present  Diagnosis management comments: Inspect reviewed: Patient has had 10 prescriptions by 9 different providers since October 2021, most recently had Norco No. 15 filled on 4/13/2022    Chart review: 4/13/22: ER: abd pain; CT neg.   4/15/22: ER note: We were able to obtain limited records from [facility] in [place].  The patient has history of asthma, Crohn's disease(patient states she is supposed to be taking Pentasa but cannot take it because it is too expensive), fibromyalgia, seizures.  She repetitively requested opioids and specifically requested Dilaudid/hydromorphone.    UDS +thc      /84   Pulse 112   Temp 98.7 °F (37.1 °C) (Oral)   Resp 16   Ht 157.5 cm (62\")   Wt 91.2 kg (201 lb 1 oz)   SpO2 95%   BMI 36.77 kg/m²      Comorbidities: Crohn's  Differentials: Dehydration URI not all inclusive of differentials considered  Radiology interpretation:  X-rays reviewed by me and interpreted by radiologist,   XR Spine Thoracic 3 View    Result Date: 4/19/2022  1.No evidence for acute fracture or subluxation. 2.Spinal electrodes enter the central canal the posterior aspect of the T12/L1 level. The distal tip of the electrodes are positioned overlying the T8 vertebral level.  Electronically Signed By-Tomas Lamb MD On:4/19/2022 12:23 PM This " report was finalized on [id number] by  Tomas Lamb MD.    XR Chest AP    Result Date: 4/19/2022  Subtle patchy ill-defined infiltrates bilaterally with associated interstitial changes. The findings suggest developing atypical/viral infection or multifocal pneumonia and suggest potential changes of Covid 19 infection. Recommend correlation for signs or symptoms of acute infection and follow-up to ensure improvement/resolution.  Electronically Signed By-Tomas Lamb MD On:4/19/2022 10:57 AM This report was finalized on [id number] by  Tomas Lamb MD.    Lab interpretation:  Labs viewed by me significant for, magnesium 1.5 positive respiratory PCR.,  Negative procalcitonin, x-ray findings were felt to represent viral illness    Appropriate PPE worn during exam.  Patient had an IV established labs and urine obtained.  She was given Compazine and Benadryl and continues to ask specifically for Dilaudid; she refused the ordered Bentyl.  She states she wants something for her back and Tylenol was offered.  On reexam patient is sleeping.  On discharge via iPad  she is asking for prescription for Norco, we discussed there are no emergent indications noted for prescription for narcotics and she will need to follow-up with her PCP for further pain management needs.  Encouraged follow-up with PCP or gastroenterology for repeat evaluation    i discussed findings with patient who voices understanding of discharge instructions, signs and symptoms requiring return to ED; discharged improved and in stable condition with follow up for re-evaluation.  She was given a prescription for Zofran        Final diagnoses:   Upper respiratory tract infection, unspecified type   Fever, unspecified fever cause   Abdominal pain, unspecified abdominal location   Vomiting, unspecified vomiting type, unspecified whether nausea present   Diarrhea, unspecified type       ED Disposition  ED Disposition     ED Disposition    Discharge    Condition   Stable    Comment   --             Damon Barone MD  [address]  [place] [address] [phone]      [facility]         Medication List      New Prescriptions    ondansetron ODT 4 MG disintegrating tablet  Commonly known as: ZOFRAN-ODT  Place 1 tablet on the tongue Every 8 (Eight) Hours As Needed for Nausea or Vomiting.        Changed    dicyclomine 20 MG tablet  Commonly known as: BENTYL  Take 1 tablet by mouth Every 6 (Six) Hours.  What changed:   · when to take this  · reasons to take this           Where to Get Your Medications      These medications were sent to [facility]pharmacy #[id number] - [place], [place] - [address] [place] [address] - [phone] [facility] [phone] [address] [place] [address]    Hours: 24-hours Phone: [phone]   · dicyclomine 20 MG tablet  · ondansetron ODT 4 MG disintegrating tablet          Layne Coffey, APRN  04/19/22 0039

## 2022-05-21 ENCOUNTER — HOSPITAL ENCOUNTER (EMERGENCY)
Facility: HOSPITAL | Age: 32
Discharge: HOME | End: 2022-05-21
Attending: EMERGENCY MEDICINE | Admitting: EMERGENCY MEDICINE

## 2022-05-21 ENCOUNTER — APPOINTMENT (OUTPATIENT)
Dept: CT IMAGING | Facility: HOSPITAL | Age: 32
End: 2022-05-21

## 2022-05-21 VITALS
HEART RATE: 84 BPM | RESPIRATION RATE: 16 BRPM | BODY MASS INDEX: 36.29 KG/M2 | HEIGHT: 62 IN | WEIGHT: 197.2 LBS | TEMPERATURE: 97.6 F | OXYGEN SATURATION: 98 % | SYSTOLIC BLOOD PRESSURE: 124 MMHG | DIASTOLIC BLOOD PRESSURE: 86 MMHG

## 2022-05-21 DIAGNOSIS — R10.32 LEFT LOWER QUADRANT ABDOMINAL PAIN: Primary | ICD-10-CM

## 2022-05-21 DIAGNOSIS — Z76.5 DRUG-SEEKING BEHAVIOR: ICD-10-CM

## 2022-05-21 DIAGNOSIS — K59.1 FUNCTIONAL DIARRHEA: ICD-10-CM

## 2022-05-21 LAB
ADV 40+41 DNA STL QL NAA+NON-PROBE: NOT DETECTED
ALBUMIN SERPL-MCNC: 4.6 G/DL (ref 3.5–5.2)
ALBUMIN/GLOB SERPL: 1.5 G/DL
ALP SERPL-CCNC: 126 U/L (ref 39–117)
ALT SERPL W P-5'-P-CCNC: 32 U/L (ref 1–33)
ANION GAP SERPL CALCULATED.3IONS-SCNC: 12 MMOL/L (ref 5–15)
AST SERPL-CCNC: 23 U/L (ref 1–32)
ASTRO TYP 1-8 RNA STL QL NAA+NON-PROBE: NOT DETECTED
BASOPHILS # BLD AUTO: 0.1 10*3/MM3 (ref 0–0.2)
BASOPHILS NFR BLD AUTO: 0.9 % (ref 0–1.5)
BILIRUB SERPL-MCNC: 0.5 MG/DL (ref 0–1.2)
BILIRUB UR QL STRIP: NEGATIVE
BUN SERPL-MCNC: 11 MG/DL (ref 6–20)
BUN/CREAT SERPL: 14.3 (ref 7–25)
C CAYETANENSIS DNA STL QL NAA+NON-PROBE: NOT DETECTED
C COLI+JEJ+UPSA DNA STL QL NAA+NON-PROBE: NOT DETECTED
C DIFF GDH STL QL: NORMAL
C DIFF TOX GENS STL QL NAA+PROBE: NOT DETECTED
CALCIUM SPEC-SCNC: 9.7 MG/DL (ref 8.6–10.5)
CHLORIDE SERPL-SCNC: 101 MMOL/L (ref 98–107)
CLARITY UR: CLEAR
CO2 SERPL-SCNC: 22 MMOL/L (ref 22–29)
COLOR UR: YELLOW
CREAT SERPL-MCNC: 0.77 MG/DL (ref 0.57–1)
CRYPTOSP DNA STL QL NAA+NON-PROBE: NOT DETECTED
DEPRECATED RDW RBC AUTO: 45.1 FL (ref 37–54)
E HISTOLYT DNA STL QL NAA+NON-PROBE: NOT DETECTED
EAEC PAA PLAS AGGR+AATA ST NAA+NON-PRB: NOT DETECTED
EC STX1+STX2 GENES STL QL NAA+NON-PROBE: NOT DETECTED
EGFRCR SERPLBLD CKD-EPI 2021: 104.6 ML/MIN/1.73
EOSINOPHIL # BLD AUTO: 0.1 10*3/MM3 (ref 0–0.4)
EOSINOPHIL NFR BLD AUTO: 0.9 % (ref 0.3–6.2)
EPEC EAE GENE STL QL NAA+NON-PROBE: NOT DETECTED
ERYTHROCYTE [DISTWIDTH] IN BLOOD BY AUTOMATED COUNT: 13.1 % (ref 12.3–15.4)
ETEC LTA+ST1A+ST1B TOX ST NAA+NON-PROBE: NOT DETECTED
G LAMBLIA DNA STL QL NAA+NON-PROBE: NOT DETECTED
GLOBULIN UR ELPH-MCNC: 3 GM/DL
GLUCOSE SERPL-MCNC: 81 MG/DL (ref 65–99)
GLUCOSE UR STRIP-MCNC: NEGATIVE MG/DL
HCG SERPL QL: NEGATIVE
HCT VFR BLD AUTO: 43.2 % (ref 34–46.6)
HGB BLD-MCNC: 15 G/DL (ref 12–15.9)
HGB UR QL STRIP.AUTO: NEGATIVE
HOLD SPECIMEN: NORMAL
HOLD SPECIMEN: NORMAL
KETONES UR QL STRIP: ABNORMAL
LEUKOCYTE ESTERASE UR QL STRIP.AUTO: NEGATIVE
LIPASE SERPL-CCNC: 18 U/L (ref 13–60)
LYMPHOCYTES # BLD AUTO: 1.6 10*3/MM3 (ref 0.7–3.1)
LYMPHOCYTES NFR BLD AUTO: 23.5 % (ref 19.6–45.3)
MCH RBC QN AUTO: 34 PG (ref 26.6–33)
MCHC RBC AUTO-ENTMCNC: 34.7 G/DL (ref 31.5–35.7)
MCV RBC AUTO: 98 FL (ref 79–97)
MONOCYTES # BLD AUTO: 0.7 10*3/MM3 (ref 0.1–0.9)
MONOCYTES NFR BLD AUTO: 9.9 % (ref 5–12)
NEUTROPHILS NFR BLD AUTO: 4.3 10*3/MM3 (ref 1.7–7)
NEUTROPHILS NFR BLD AUTO: 64.8 % (ref 42.7–76)
NITRITE UR QL STRIP: NEGATIVE
NOROVIRUS GI+II RNA STL QL NAA+NON-PROBE: NOT DETECTED
NRBC BLD AUTO-RTO: 0.1 /100 WBC (ref 0–0.2)
P SHIGELLOIDES DNA STL QL NAA+NON-PROBE: NOT DETECTED
PH UR STRIP.AUTO: <=5 [PH] (ref 5–8)
PLATELET # BLD AUTO: 339 10*3/MM3 (ref 140–450)
PMV BLD AUTO: 6.8 FL (ref 6–12)
POTASSIUM SERPL-SCNC: 4.6 MMOL/L (ref 3.5–5.2)
PROT SERPL-MCNC: 7.6 G/DL (ref 6–8.5)
PROT UR QL STRIP: NEGATIVE
RBC # BLD AUTO: 4.4 10*6/MM3 (ref 3.77–5.28)
RVA RNA STL QL NAA+NON-PROBE: NOT DETECTED
S ENT+BONG DNA STL QL NAA+NON-PROBE: NOT DETECTED
SAPO I+II+IV+V RNA STL QL NAA+NON-PROBE: NOT DETECTED
SHIGELLA SP+EIEC IPAH ST NAA+NON-PROBE: NOT DETECTED
SODIUM SERPL-SCNC: 135 MMOL/L (ref 136–145)
SP GR UR STRIP: 1.02 (ref 1–1.03)
UROBILINOGEN UR QL STRIP: ABNORMAL
V CHOL+PARA+VUL DNA STL QL NAA+NON-PROBE: NOT DETECTED
V CHOLERAE DNA STL QL NAA+NON-PROBE: NOT DETECTED
WBC NRBC COR # BLD: 6.6 10*3/MM3 (ref 3.4–10.8)
WHOLE BLOOD HOLD COAG: NORMAL
WHOLE BLOOD HOLD SPECIMEN: NORMAL
Y ENTEROCOL DNA STL QL NAA+NON-PROBE: NOT DETECTED

## 2022-05-21 PROCEDURE — 25010000002 METHYLPREDNISOLONE PER 40 MG: Performed by: NURSE PRACTITIONER

## 2022-05-21 PROCEDURE — 81003 URINALYSIS AUTO W/O SCOPE: CPT | Performed by: NURSE PRACTITIONER

## 2022-05-21 PROCEDURE — 96374 THER/PROPH/DIAG INJ IV PUSH: CPT

## 2022-05-21 PROCEDURE — 80053 COMPREHEN METABOLIC PANEL: CPT | Performed by: NURSE PRACTITIONER

## 2022-05-21 PROCEDURE — 74177 CT ABD & PELVIS W/CONTRAST: CPT

## 2022-05-21 PROCEDURE — 25010000002 MORPHINE PER 10 MG: Performed by: NURSE PRACTITIONER

## 2022-05-21 PROCEDURE — 25010000002 DIPHENHYDRAMINE PER 50 MG: Performed by: NURSE PRACTITIONER

## 2022-05-21 PROCEDURE — 85025 COMPLETE CBC W/AUTO DIFF WBC: CPT | Performed by: NURSE PRACTITIONER

## 2022-05-21 PROCEDURE — 0 IOPAMIDOL PER 1 ML: Performed by: EMERGENCY MEDICINE

## 2022-05-21 PROCEDURE — 25010000002 ONDANSETRON PER 1 MG: Performed by: NURSE PRACTITIONER

## 2022-05-21 PROCEDURE — 84703 CHORIONIC GONADOTROPIN ASSAY: CPT | Performed by: NURSE PRACTITIONER

## 2022-05-21 PROCEDURE — 87493 C DIFF AMPLIFIED PROBE: CPT | Performed by: NURSE PRACTITIONER

## 2022-05-21 PROCEDURE — 99283 EMERGENCY DEPT VISIT LOW MDM: CPT

## 2022-05-21 PROCEDURE — P9612 CATHETERIZE FOR URINE SPEC: HCPCS

## 2022-05-21 PROCEDURE — 83690 ASSAY OF LIPASE: CPT | Performed by: NURSE PRACTITIONER

## 2022-05-21 PROCEDURE — 96375 TX/PRO/DX INJ NEW DRUG ADDON: CPT

## 2022-05-21 PROCEDURE — 87507 IADNA-DNA/RNA PROBE TQ 12-25: CPT | Performed by: NURSE PRACTITIONER

## 2022-05-21 RX ORDER — METHYLPREDNISOLONE SODIUM SUCCINATE 40 MG/ML
40 INJECTION, POWDER, LYOPHILIZED, FOR SOLUTION INTRAMUSCULAR; INTRAVENOUS EVERY 4 HOURS
Status: DISCONTINUED | OUTPATIENT
Start: 2022-05-21 | End: 2022-05-21 | Stop reason: HOSPADM

## 2022-05-21 RX ORDER — DIPHENHYDRAMINE HYDROCHLORIDE 50 MG/ML
50 INJECTION INTRAMUSCULAR; INTRAVENOUS ONCE
Status: COMPLETED | OUTPATIENT
Start: 2022-05-21 | End: 2022-05-21

## 2022-05-21 RX ORDER — ONDANSETRON 2 MG/ML
4 INJECTION INTRAMUSCULAR; INTRAVENOUS ONCE
Status: COMPLETED | OUTPATIENT
Start: 2022-05-21 | End: 2022-05-21

## 2022-05-21 RX ORDER — DICYCLOMINE HYDROCHLORIDE 10 MG/ML
20 INJECTION INTRAMUSCULAR ONCE
Status: DISCONTINUED | OUTPATIENT
Start: 2022-05-21 | End: 2022-05-21 | Stop reason: HOSPADM

## 2022-05-21 RX ORDER — MORPHINE SULFATE 2 MG/ML
2 INJECTION, SOLUTION INTRAMUSCULAR; INTRAVENOUS ONCE
Status: COMPLETED | OUTPATIENT
Start: 2022-05-21 | End: 2022-05-21

## 2022-05-21 RX ORDER — SODIUM CHLORIDE 0.9 % (FLUSH) 0.9 %
10 SYRINGE (ML) INJECTION AS NEEDED
Status: DISCONTINUED | OUTPATIENT
Start: 2022-05-21 | End: 2022-05-21 | Stop reason: HOSPADM

## 2022-05-21 RX ADMIN — IOPAMIDOL 100 ML: 755 INJECTION, SOLUTION INTRAVENOUS at 14:37

## 2022-05-21 RX ADMIN — SODIUM CHLORIDE, POTASSIUM CHLORIDE, SODIUM LACTATE AND CALCIUM CHLORIDE 1000 ML: 600; 310; 30; 20 INJECTION, SOLUTION INTRAVENOUS at 13:07

## 2022-05-21 RX ADMIN — METHYLPREDNISOLONE SODIUM SUCCINATE 40 MG: 40 INJECTION, POWDER, FOR SOLUTION INTRAMUSCULAR; INTRAVENOUS at 13:06

## 2022-05-21 RX ADMIN — MORPHINE SULFATE 2 MG: 2 INJECTION, SOLUTION INTRAMUSCULAR; INTRAVENOUS at 13:07

## 2022-05-21 RX ADMIN — ONDANSETRON 4 MG: 2 INJECTION INTRAMUSCULAR; INTRAVENOUS at 13:06

## 2022-05-21 RX ADMIN — DIPHENHYDRAMINE HYDROCHLORIDE 50 MG: 50 INJECTION, SOLUTION INTRAMUSCULAR; INTRAVENOUS at 13:06

## 2022-05-21 NOTE — ED PROVIDER NOTES
Subjective   32-year-old deaf female presents with complaint of left lower quadrant pain accompanied with watery diarrhea since last night.  She reports that it comes approximately every 30 minutes.  She reports a history of Crohn's disease.  She reports that she often has hematochezia intermittently and last had this a couple of days ago.  She denies current melena hematochezia.  Denies vomiting or hematemesis.  She reports she recently was diagnosed with C. difficile and completed her antibiotics 4 weeks ago.  She denies fever sweats chills.  Denies urinary symptoms.  Denies any hospitalizations for her Crohn's disease.  She reports that she is established with gastroenterology of [place].  She reports that she lives in [place] but she is visiting her boyfriend who lives in the area.  She reports that she was seen by GI 3 weeks ago and has a follow-up scheduled in September.  She reports that they want to follow-up with her next week via phone.  She reports that her gastroenterologist recently put her on Pentasa and Entocort.  She denies ill contact.  Reports that she did receive her flu shot.  She reports natural COVID infection January 2021, and completed Pfizer series in January 2022.  She reports that she had a DVT in her right arm 2 months ago and completed Eliquis for this.  She is no longer on any anticoagulation therapy.  She is a daily smoker denies EtOH or illicit drug use.  Her last menstrual period was 3 weeks ago, she denies oral contraceptive use due to DVT history.  Patient reports history of cholecystectomy, otherwise no abdominal surgeries.    1. Location: Left lower quadrant  2. Quality: Crampy  3. Severity: Moderate  4. Worsening factors: Diarrhea  5. Alleviating factors: Denies  6. Onset: Last night  7. Radiation: Diffuse  8. Frequency: Constant periods of intensity  9. Co-morbidities: Past Medical History:  No date: Crohn's disease without complication (HCC)  10. Source: ASHLEY Camargo   [id number]          Review of Systems   Constitutional: Negative for appetite change, chills, diaphoresis and fever.   Respiratory: Negative for shortness of breath.    Cardiovascular: Negative for chest pain.   Gastrointestinal: Positive for abdominal pain, blood in stool and diarrhea. Negative for constipation, nausea and vomiting.   Genitourinary: Negative for decreased urine volume, difficulty urinating, flank pain and hematuria.   Musculoskeletal: Negative for back pain.   Skin: Negative for color change, pallor and rash.   Neurological: Negative for dizziness and syncope.   Hematological: Negative for adenopathy.   Psychiatric/Behavioral: Negative for confusion.   All other systems reviewed and are negative.      Past Medical History:   Diagnosis Date   • Crohn's disease without complication (HCC)        Allergies   Allergen Reactions   • Contrast Dye Hives     Does okay with Benadryl before IV contrast per pt.       Past Surgical History:   Procedure Laterality Date   • CHOLECYSTECTOMY         History reviewed. No pertinent family history.    Social History     Socioeconomic History   • Marital status: Single   Tobacco Use   • Smoking status: Unknown If Ever Smoked   • Smokeless tobacco: Never Used   Vaping Use   • Vaping Use: Some days   • Substances: Nicotine   Substance and Sexual Activity   • Alcohol use: Not Currently   • Drug use: Never   • Sexual activity: Defer           Objective   Physical Exam  Vitals and nursing note reviewed.   Constitutional:       General: She is awake. She is not in acute distress.     Appearance: Normal appearance. She is well-developed. She is obese. She is not ill-appearing, toxic-appearing or diaphoretic.   HENT:      Mouth/Throat:      Mouth: Mucous membranes are dry.      Pharynx: Oropharynx is clear.   Eyes:      General: No scleral icterus.  Cardiovascular:      Rate and Rhythm: Normal rate and regular rhythm.      Pulses: Normal pulses.      Heart sounds:  Normal heart sounds, S1 normal and S2 normal. Heart sounds not distant. No murmur heard.    No friction rub. No gallop.   Pulmonary:      Effort: Pulmonary effort is normal.      Breath sounds: Normal breath sounds and air entry.   Abdominal:      General: Abdomen is protuberant. Bowel sounds are normal. There is no distension.      Palpations: Abdomen is soft. There is no mass.      Tenderness: There is abdominal tenderness in the left lower quadrant. There is no right CVA tenderness, left CVA tenderness, guarding or rebound.      Hernia: No hernia is present.       Skin:     General: Skin is warm and dry.      Capillary Refill: Capillary refill takes less than 2 seconds.      Coloration: Skin is not jaundiced or pale.      Findings: No rash.   Neurological:      Mental Status: She is alert and oriented to person, place, and time.   Psychiatric:         Mood and Affect: Mood normal.         Behavior: Behavior normal. Behavior is cooperative.         Thought Content: Thought content normal.         Judgment: Judgment normal.         Procedures           ED Course  ED Course as of 05/21/22 1632   Sat May 21, 2022   1410 Awaiting patient to go to CT. [AL]      ED Course User Index  [AL] Rand Stephens, TAMIKO    CT Abdomen Pelvis With Contrast    Result Date: 5/21/2022  1. No acute abnormality in the abdomen or pelvis. 2. Chronic incidental findings above.    Electronically Signed By-Julio Sims MD On:5/21/2022 3:03 PM This report was finalized on [id number] by  Julio Sims MD.    Medications   sodium chloride 0.9 % flush 10 mL (has no administration in time range)   methylPREDNISolone sodium succinate (SOLU-Medrol) injection 40 mg (40 mg Intravenous Given 5/21/22 1306)   dicyclomine (BENTYL) injection 20 mg (has no administration in time range)   lactated ringers bolus 1,000 mL (1,000 mL Intravenous New Bag 5/21/22 1307)   morphine injection 2 mg (2 mg Intravenous Given 5/21/22 1307)   ondansetron (ZOFRAN)  injection 4 mg (4 mg Intravenous Given 5/21/22 1306)   diphenhydrAMINE (BENADRYL) injection 50 mg (50 mg Intravenous Given 5/21/22 1306)   iopamidol (ISOVUE-370) 76 % injection 100 mL (100 mL Intravenous Given 5/21/22 1437)     Labs Reviewed   COMPREHENSIVE METABOLIC PANEL - Abnormal; Notable for the following components:       Result Value    Sodium 135 (*)     Alkaline Phosphatase 126 (*)     All other components within normal limits    Narrative:     GFR Normal >60  Chronic Kidney Disease <60  Kidney Failure <15     URINALYSIS W/ MICROSCOPIC IF INDICATED (NO CULTURE) - Abnormal; Notable for the following components:    Ketones, UA Trace (*)     All other components within normal limits    Narrative:     Urine microscopic not indicated.   CBC WITH AUTO DIFFERENTIAL - Abnormal; Notable for the following components:    MCV 98.0 (*)     MCH 34.0 (*)     All other components within normal limits   GASTROINTESTINAL PANEL, PCR - Normal    Narrative:     If Aeromonas, Staphylococcus aureus or Bacillus cereus are suspected, please order [id number]: Stool Culture, Aeromonas, S aureus, B Cereus.   CLOSTRIDIUM DIFFICILE EIA - Normal   CLOSTRIDIUM DIFFICILE TOXIN, PCR - Normal   LIPASE - Normal   HCG, SERUM, QUALITATIVE - Normal   CLOSTRIDIUM DIFFICILE TOXIN    Narrative:     The following orders were created for panel order Clostridium Difficile Toxin - Stool, Per Rectum.  Procedure                               Abnormality         Status                     ---------                               -----------         ------                     Clostridium Difficile EI...[[id number]]  Normal              Final result                 Please view results for these tests on the individual orders.   RAINBOW DRAW    Narrative:     The following orders were created for panel order [place] Draw.  Procedure                               Abnormality         Status                     ---------                               -----------          ------                     Green Top (Gel)[[id number]]                                  Final result               Lavender Top[[id number]]                                     Final result               Gold Top - SST[[id number]]                                   Final result               Light Blue Top[[id number]]                                   Final result                 Please view results for these tests on the individual orders.   CBC AND DIFFERENTIAL    Narrative:     The following orders were created for panel order CBC & Differential.  Procedure                               Abnormality         Status                     ---------                               -----------         ------                     CBC Auto Differential[[id number]]        Abnormal            Final result                 Please view results for these tests on the individual orders.   GREEN TOP   LAVENDER TOP   GOLD TOP - SST   LIGHT BLUE TOP                                                  MDM  Number of Diagnoses or Management Options  Drug-seeking behavior  Functional diarrhea  Left lower quadrant abdominal pain  Diagnosis management comments: Chart Review: 5/12/2022 patient was seen at [facility] for chest pain.   Comorbidity: Past Medical History:  No date: Crohn's disease without complication (HCC)  Imaging: Was interpreted by physician and reviewed by myself: CT Abdomen Pelvis With Contrast   Final Result    1. No acute abnormality in the abdomen or pelvis.    2. Chronic incidental findings above.           Electronically Signed By-Julio Sims MD On:5/21/2022 3:03 PM    This report was finalized on [id number] by  Julio Sims MD.    Patient undressed and placed in gown for exam.  Appropriate PPE worn during patient exam.  Abdomen is obese, round, tender in left lower quadrant.  Patient is in no acute distress.  Bowel sounds present in all 4 quadrants.  IV established and labs obtained.  Abdominal  "protocol initiated.  Patient was given lactated Ringer's 1 L bolus, Zofran 4 mg IV.  She was premedicated due to reported allergy of IV contrast.  CT of the abdomen pelvis with IV contrast obtained to the above findings. CBC CMP unremarkable urinalysis showed trace ketones otherwise unremarkable GI panel and C. difficile negative.  Upon reassessment patient is noted to be resting comfortably in no distress.  She is pink warm and dry, her vital signs are stable.  Patient has had multiple ER visits at several different facilities extending from [facility], and [facility].  She is also had multiple narcotics given from multiple providers.  There is a concern for drug-seeking behavior as she is specifically asked for \"Dilaudid\", and \"oxycodone\" by name.  It was explained to her that there were no acute findings to give her narcotic analgesia.  She was offered Bentyl and declined this medication.  Utilized ASHLEY Maradiaga ID#: [id number] for discharge instruction.    Disposition/Treatment: Discussed results with patient, verbalized understanding.  Discussed reasons to return to the ER, patient verbalized understanding.  Agreeable with plan of care.  Patient was stable upon discharge.       Part of this note may be an electronic transcription/translation of spoken language to printed text using the Dragon Dictation System.            Amount and/or Complexity of Data Reviewed  Clinical lab tests: reviewed  Tests in the radiology section of CPT®: reviewed  Obtain history from someone other than the patient: (Inspect performed per Marcela ED pharmacist yielding Norco dispensed yesterday by primary care, Norco dispensed in April by a different provider, and Percocet dispensed in March by yet another provider.)    Patient Progress  Patient progress: stable      Final diagnoses:   Left lower quadrant abdominal pain   Functional diarrhea   Drug-seeking behavior       ED Disposition  ED Disposition     ED " Disposition   Discharge    Condition   Stable    Comment   --             [facility] EMERGENCY DEPARTMENT  [address] [address] [phone]  Go to   If symptoms worsen         Medication List      No changes were made to your prescriptions during this visit.          Rand Stephens, APRN  05/21/22 1635

## 2022-05-21 NOTE — ED NOTES
NP spoke with patient regarding d/c with the use of . Pt expressed frustration with abdominal pain, sharing that Howie doesn't see to help with her abdominal pain. NP verbalizes that pt received hydrocodone previously for pain on 5/20/22 so she will not be writing a prescription for narcotics. Pt shares that Hydrocodone is not helpful with her abdominal pain, and that she would like for NP to give her Oxycodone. NP explains again that because of patient's recent three visits in which she received narcotics that she wouldn't be able to give the patient narcotics.

## 2022-05-21 NOTE — DISCHARGE INSTRUCTIONS
Take your pain medications that you have available to you at home.  Clear liquid diet, advance as tolerated.  As discussed, avoid anything red or orange as this may appear like blood in your stool.  Schedule follow-up with your primary care physician in [place] as well as keep your scheduled follow-up with gastroenterologist in [place]. Return to the ER for new or worsening symptoms.

## 2022-05-25 NOTE — TELEPHONE ENCOUNTER
Message from [employer]:  Original authorizing provider: LOVE Bishop would like a refill of the following medications:  gabapentin (NEURONTIN) 300 MG capsule [Aura Rosario PA-C]  lidocaine (LIDODERM) 5 % Patch [Aura Rosario PA-C]  HYDROcodone-acetaminophen (NORCO) 5-325 MG per tablet [Aura Rosario PA-C]    Preferred pharmacy: [place], [place] - [address]    Comment:     Acute hypoxemic respiratory failure 2/2 non SARS COV2 coronavirus, influenza A c/b campylobacter diarrhea. Transitioned to [place] and is doing well. Floor eligible.

## 2022-06-04 ENCOUNTER — HEALTH MAINTENANCE LETTER (OUTPATIENT)
Age: 32
End: 2022-06-04

## 2022-06-15 NOTE — TELEPHONE ENCOUNTER
Noted. Please refer to other encounter sent on 1/14/21.   Jose Finney RN, BSN  [facility]  January 14, 2021     No

## 2022-06-18 NOTE — NURSING NOTE
"Chief Complaint   Patient presents with     Cough     Back Pain       Initial /82 (BP Location: Left arm, Patient Position: Supine, Cuff Size: Adult Regular)  Pulse 91  Temp 99.5  F (37.5  C) (Oral)  Resp 14  Ht 5' 1\" (1.549 m)  Wt 148 lb (67.1 kg)  LMP 02/02/2012  SpO2 97%  BMI 27.96 kg/m2 Estimated body mass index is 27.96 kg/(m^2) as calculated from the following:    Height as of this encounter: 5' 1\" (1.549 m).    Weight as of this encounter: 148 lb (67.1 kg).  Medication Reconciliation: complete   Oneyda Tapia, MARY      " General

## 2022-08-22 NOTE — TELEPHONE ENCOUNTER
[facility] should have a colorectal surgeon as part of their group. So she should stick with them.     I have sent Miralax.     Zach Rosario PA-C  [facility]       
Message sent.  Abimbola Castro, MARY    
Referrals placed for [facility] [phone]   [email or web address]    Nothing further to prescribe. OK to use OTC products as helpful     Zach Rosario PA-C  [facility]       
Responded via [email or web address]. Nohemi Jennings RN, BSN         
Routing to [facility] to review and advise. Please clarify if able to refer for second opinion, give Miralax prescription, and address concerns with this being hereditary. Nohemi Jennings RN, BSN      
Sent patient my chart message with [facility] response through my chart.   
Spoke with patient via . She is still having rectal pain and bleeding.  Better then yesterday.  Having diarrhea today and got her period. Wondering what else she can try at home.  Advised tucks or witch hazel pads to keep clean and try could sit in bath or on a donut pillow for more comfort. Is there anything else that could be prescribed like a Preparation H suppository or should she not try that due to bleeding? Uses bonnie trev    Can't go to [facility] because she got terminated.     Would like new referral to a GI provider at [place] and a Colorectal surgeon referral for the hemorrhoids.     States that her brother had anal fissures so he needed surgery for that so she is worried that she will have to go down that road.    OK to respond to patient via [employer].    Iggy Bernstein, RN, BSN        
[place]

## 2022-08-30 DIAGNOSIS — G89.29 CHRONIC BILATERAL LOW BACK PAIN WITH LEFT-SIDED SCIATICA: ICD-10-CM

## 2022-08-30 DIAGNOSIS — M54.42 CHRONIC BILATERAL LOW BACK PAIN WITH LEFT-SIDED SCIATICA: ICD-10-CM

## 2022-08-31 RX ORDER — GABAPENTIN 300 MG/1
CAPSULE ORAL
Qty: 270 CAPSULE | Refills: 1 | OUTPATIENT
Start: 2022-08-31

## 2022-08-31 NOTE — TELEPHONE ENCOUNTER
Have not seen patient in >1 year     Route to PCP    Zach Rosario PA-C  [facility]

## 2022-09-13 NOTE — PROGRESS NOTES
SUBJECTIVE:   Katy Islas is a 28 year old female who presents to clinic today for the following health issues:    HPI  Patient here for mole removal after consult on 7/3/18  4. Moles  - As above, patient with history of atypia in previous biopsies   - Discussed risks and benefits of procedure including bleeding, infection, and scarring   - Recommend procedure as follows:          1) Left forearm - 3 mm punch biopsy, with sutures and removal in 10-14 days              2) Left sholder  - 4 mm punch biopsy, with sutures and removal in 10-14 days          3) Right elbow -  4 mm punch biopsy, with sutures and removal in 10-14 days            4) Right flank - 6 mm punch biopsy, with sutures and removal in 10-14 days           Supplies: 1% lidocaine with epi, 4 alcohol wipes, chloraprep, biopsy blade - 3, 4, & 6 mm punch, lac pack, Sutures: 4-0 Ethilon, sterile 4x4 pack, 4 - sterile 2x2, 4 - small tegaderm        Problem list and histories reviewed & adjusted, as indicated.  Additional history: as documented    ROS:  Constitutional, HEENT, cardiovascular, pulmonary, gi and gu systems are negative, except as otherwise noted.    OBJECTIVE:   BP (!) 135/94  Pulse 68  Temp 98.8  F (37.1  C) (Temporal)  Resp 18  Wt 171 lb (77.6 kg)  BMI 31.28 kg/m2  Body mass index is 31.28 kg/(m^2).  GENERAL APPEARANCE: healthy, alert and no distress  EYES: Eyes grossly normal to inspection, PERRLA, conjunctivae and sclerae without injection or discharge, EOM intact   MS: No musculoskeletal defects are noted and gait is age appropriate without ataxia   SKIN:   Left forearm - 0.2 x 0.3 cm oval shaped bi-colored and raised mole with regular borders   Left shoulder - 0.4 x 0.3 cm tri-colored oval shaped raised mole with irregular borders  Right elbow - 0.2 x 0.3 cm bi-colored oval shaped raised mole with irregular borders    Right flank - 0.6 x 0.4 cm oblong flat mole single colored with irregular borders   No other suspicious  [facility]  Hospitalist Group                                                                                          Hospitalist Progress Note  Eun Anderson MD  Answering service: [phone] from in house phone        Date of Service:  2022  NAME:  Nguyen Acharya  :  1964  MRN:  [id number]      Admission Summary: This is a 58-year-old man with a past medical history significant for rectal adenocarcinoma, hypertension, benign prostatic hyperplasia, alcohol, tobacco, and substance abuse, who presented at the emergency room at [facility] with abdominal pain. Interval history / Subjective: Follow up SBO   Had bowel movements today  Feeling fine  No new issues  NG tube out    Assessment & Plan:     SBO   In the setting of prior rectal cancer  - NGT out  -Now on full liquids  - PRN pain medications as needed  - IV PPI BID   -Xray abd slight improvement  - Appreciate discussion with Surgery  -Appreciate oncology  -Outpatient follow up with Dr Curt Tristan and GI       UTI - UA suggestive, abdominal pain   BPH/UPJ obstruction  -on mondragon since 2022  - Urine culture with ESBL E coli, switch CTX to zosyn   - Continue flomax when able to take PO   -Outpatient follow up with Urology but patient wants to see his urologist while he is here    Chronic pancreatitis   - On IV dilaudid for now, but no sign of pancreatitis     HTN - hydralazine PRN   Substance abuse - UDS positive for cocaine, narcotics, THC.  Counseled on cessation   Acute on chronic anemia -  transfuse for hemoglobin less than 7, Hem following  Tobacco cessation - nicotine patch      Code status: FULL  Prophylaxis: SCDs    Plan: Gastrograffin challenge today and then Laura Guzman 13 discussed with: pt, RN  Anticipated Disposition: Home, greater than 48 hours      Hospital Problems  Date Reviewed: 2022            Codes Class Noted POA    Small bowel obstruction ([facility] [address] ICD-10-CM: V08.885  ICD-9-CM: 560.9  9/9/2022 Unknown         Review of Systems:   A comprehensive review of systems was negative except for that written in the HPI. Vital Signs:    Last 24hrs VS reviewed since prior progress note. Most recent are:  Visit Vitals  BP (!) 171/80 (BP 1 Location: Right upper arm, BP Patient Position: Sitting)   Pulse (!) 54   Temp 97.3 °F (36.3 °C)   Resp 19   Ht 5' 8\" (1.727 m)   SpO2 100%   BMI 18.25 kg/m²         Intake/Output Summary (Last 24 hours) at 9/13/2022 1258  Last data filed at 9/13/2022 3598  Gross per 24 hour   Intake --   Output 2300 ml   Net -2300 ml          Physical Examination:     I had a face to face encounter with this patient and independently examined them on 9/13/2022 as outlined below:          Constitutional:  No acute distress, cooperative, pleasant    ENT:  Oral mucosa moist, oropharynx benign. Resp:  CTA bilaterally. No wheezing/rhonchi/rales. No accessory muscle use. CV:  Regular rhythm, normal rate, no murmurs, gallops, rubs    GI:  Soft, + tender to palpation, reduced bowel sounds in the lower quadrants. Musculoskeletal:  No edema, warm, 2+ pulses throughout    Neurologic:  Moves all extremities. AAOx3, CN II-XII reviewed            Data Review:    Review and/or order of clinical lab test  Review and/or order of tests in the radiology section of CPT  Review and/or order of tests in the medicine section of CPT      Labs:     Recent Labs     09/13/22 0250 09/12/22  0115   WBC 8.2 5.4   HGB 9.8* 8.4*   HCT 28.7* 25.7*    135*       Recent Labs     09/13/22  0250 09/12/22  0115 09/11/22  0040    140 140   K 3.5 3.3* 3.6    107 107   CO2 27 30 30   BUN 6 8 11   CREA 1.01 1.02 0.91   GLU 80 94 86   CA 8.7 8.3* 8.5   MG  --  1.6 1.8   PHOS  --  3.1 3.1       No results for input(s): ALT, AP, TBIL, TBILI, TP, ALB, GLOB, GGT, AML, LPSE in the last 72 hours.     No lab exists for component: SGOT, GPT, AMYP, HLPSE    No results for lesions or rashes, hydration status appears adeuqate with normal skin turgor   PSYCH: Alert and oriented x3; speech- coherent , normal rate and volume; able to articulate logical thoughts, able to abstract reason, no tangential thoughts, no hallucinations or delusions, mentation appears normal, Mood is euthymic. Affect is appropriate for this mood state and bright. Thought content is free of suicidal ideation, hallucinations, and delusions. Dress is adequate and upkept. Eye contact is good during conversation.       Diagnostic Test Results:  Surg path - pending       Procedure Note:  Pause for the cause has been completed prior to the prceedure.   1. Patient was identified by both name and date of birth   2. The correct site was identified   3. Site was marked by provider    4. Written informed consent correct and signed or verbal authorization  to proceed was obtained   5. Verifed necessary supplies, equipment, and diagnostics are available    6. Time out was performed immediately prior to procedure    Objective: The lesion(s) is/are of the above mentioned size and location and is/are atypical.     1) Left forearm - The area was prepped using alcohol swabs and appropriately anesthetized using 0.5 cc of 1% lidocaine with epi. Using the usual technique, punch biopsy size 3 mm was performed. Hemostasis was obtained using 1 single interrupted suture of 4-0 Ethilon. An appropriate dressing was applied. The procedure was well tolerated and without complications.    2) Left shoulder - The area was prepped using alcohol swabs and appropriately anesthetized using 1 cc of 1% lidocaine with epi. Using the usual technique, punch biopsy size 4 mm was performed. Hemostasis was obtained using 2 single interrupted sutures of 4-0 Ethilon. An appropriate dressing was applied. The procedure was well tolerated and without complications.    3) Right elbow - The area was prepped using alcohol swabs and appropriately anesthetized using 1  cc of 1% lidocaine with epi. Using the usual technique, punch biopsy size 4 mm was performed. Hemostasis was obtained using 2 singled interrupted sutures of 4-0 Ethilon. An appropriate dressing was applied. The procedure was well tolerated and without complications.    4) Right flank - The area was prepped using alcohol swabs and appropriately anesthetized using 2 cc of 1% lidocaine with epi. Using the usual technique, punch biopsy size 6 mm was performed. Hemostasis was obtained using 3 single interrupted sutures of 4-0 Ethilon. An appropriate dressing was applied. The procedure was well tolerated and without complications.      ASSESSMENT/PLAN:       ICD-10-CM    1. Atypical mole D22.9 Surgical pathology exam     BIOPSY SKIN/SUBQ/MUC MEM, SINGLE LESION     BIOPSY SKIN/SUBQ/MUC MEM, EACH ADDTL LESION     BIOPSY SKIN/SUBQ/MUC MEM, EACH ADDTL LESION     BIOPSY SKIN/SUBQ/MUC MEM, EACH ADDTL LESION   2. Gastroesophageal reflux disease without esophagitis K21.9 lidocaine (viscous) 15 mL, alum & mag hydroxide-simethicone 15 mL GI Cocktail     - Procedure as above   - Await surg path results   - Suture removal in 10-14 days (total 8)   - Discussed wound care at length, bandages to stay in place for 24 hours   - Hand out on wound care given   - Watch for signs of infection  - Await pathology     - Patient continues to have GERD, given GI cocktail in clinic today with good relief  - Continue at home medications   - Has GI specialist appointment in August (about 1 month from today)     The patient indicates understanding of these issues and agrees with the plan.    Follow up: PRN     Due to language barrier, an  was present during the history-taking and subsequent discussion (and for part of the physical exam) with this patient.      Aura Rosario PA-C  [facility]   input(s): INR, PTP, APTT, INREXT, INREXT in the last 72 hours. No results for input(s): FE, TIBC, PSAT, FERR in the last 72 hours. Lab Results   Component Value Date/Time    Folate 41.4 (H) 09/11/2022 10:49 AM        No results for input(s): PH, PCO2, PO2 in the last 72 hours. No results for input(s): CPK, CKNDX, TROIQ in the last 72 hours.     No lab exists for component: CPKMB  Lab Results   Component Value Date/Time    Cholesterol, total 147 01/02/2019 04:00 AM    HDL Cholesterol 66 01/02/2019 04:00 AM    LDL, calculated 65.6 01/02/2019 04:00 AM    Triglyceride 77 01/02/2019 04:00 AM    CHOL/HDL Ratio 2.2 01/02/2019 04:00 AM     No results found for: GLUCPOC  Lab Results   Component Value Date/Time    Color DARK YELLOW 09/09/2022 12:45 PM    Appearance TURBID (A) 09/09/2022 12:45 PM    Specific gravity 1.025 09/09/2022 12:45 PM    Specific gravity 1.010 09/03/2021 10:01 PM    pH (UA) 6.0 09/09/2022 12:45 PM    Protein 300 (A) 09/09/2022 12:45 PM    Glucose Negative 09/09/2022 12:45 PM    Ketone TRACE (A) 09/09/2022 12:45 PM    Bilirubin Negative 06/03/2022 04:57 AM    Urobilinogen 1.0 09/09/2022 12:45 PM    Nitrites Positive (A) 09/09/2022 12:45 PM    Leukocyte Esterase LARGE (A) 09/09/2022 12:45 PM    Epithelial cells FEW 09/09/2022 12:45 PM    Bacteria 2+ (A) 09/09/2022 12:45 PM    WBC >100 (H) 09/09/2022 12:45 PM    RBC >100 (H) 09/09/2022 12:45 PM         Medications Reviewed:     Current Facility-Administered Medications   Medication Dose Route Frequency    alteplase (CATHFLO) 1 mg in sterile water (preservative free) 1 mL injection  1 mg InterCATHeter PRN    heparin (porcine) pf 500 Units  500 Units InterCATHeter PRN    dextrose 5% and 0.9% NaCl infusion  100 mL/hr IntraVENous CONTINUOUS    piperacillin-tazobactam (ZOSYN) 3.375 g in 0.9% sodium chloride (MBP/ADV) 100 mL MBP  3.375 g IntraVENous Q8H    phenol throat spray (CHLORASEPTIC) 1 Spray  1 Spray Oral PRN    sodium chloride (NS) flush 5-40 mL 5-40 mL IntraVENous Q8H    sodium chloride (NS) flush 5-40 mL  5-40 mL IntraVENous PRN    acetaminophen (TYLENOL) tablet 650 mg  650 mg Oral Q6H PRN    Or    acetaminophen (TYLENOL) suppository 650 mg  650 mg Rectal Q6H PRN    ondansetron (ZOFRAN ODT) tablet 4 mg  4 mg Oral Q8H PRN    Or    ondansetron (ZOFRAN) injection 4 mg  4 mg IntraVENous Q6H PRN    enoxaparin (LOVENOX) injection 40 mg  40 mg SubCUTAneous DAILY    pantoprazole (PROTONIX) 40 mg in 0.9% sodium chloride 10 mL injection  40 mg IntraVENous Q12H    HYDROmorphone (DILAUDID) injection 1 mg  1 mg IntraVENous Q4H PRN    hydrALAZINE (APRESOLINE) 20 mg/mL injection 10 mg  10 mg IntraVENous Q6H PRN    nicotine (NICODERM CQ) 21 mg/24 hr patch 1 Patch  1 Patch TransDERmal DAILY     ______________________________________________________________________  EXPECTED LENGTH OF STAY: 3d 4h  ACTUAL LENGTH OF STAY:          [address] [facility] MD

## 2022-10-09 ENCOUNTER — HEALTH MAINTENANCE LETTER (OUTPATIENT)
Age: 32
End: 2022-10-09

## 2022-11-01 NOTE — TELEPHONE ENCOUNTER
Kidney recommends against paxlovid with cyclosporine. Recommend molnupiravir as alternative. Please call patient as this was sent to pharmacy instead.    Belem Meléndez, CNP     Reason for Call:  Other returning call    Detailed comments: Patient called clinic back. Relayed message from below. She stated she did see the message on MyChart and she did take the phosphorus the day labs were done and is still continuing to take it - she has 3.5 days left of supplement.    Phone Number Patient can be reached at: Home number on file [phone] (home)    Best Time: any    Can we leave a detailed message on this number? YES    Call taken on 11/8/2018 at 9:47 AM by Genie Luo

## 2023-01-01 NOTE — TELEPHONE ENCOUNTER
Patient thinks she has a uti so wondering if you could place lab orders for that today.I told her that shouldn't be a problem    
This is done    Zach Rosario PA-C  [facility] [place]     
Intact

## 2023-01-21 ENCOUNTER — MYC MEDICAL ADVICE (OUTPATIENT)
Dept: FAMILY MEDICINE | Facility: CLINIC | Age: 33
End: 2023-01-21

## 2023-01-21 NOTE — TELEPHONE ENCOUNTER
Patient Quality Outreach    Patient is due for the following:   Asthma  -  ACT needed and AAP  Depression  -  PHQ-9 needed  Physical Annual Wellness Visit      Topic Date Due     Pneumococcal Vaccine (2 - PCV) 10/15/2020     COVID-19 Vaccine (3 - Pfizer risk series) 02/04/2022     Chronic Opioid Use -  Treatment Agreement (CSA), Urine Drug Screen, JULIETH-7 and PHQ-9    Next Steps:   No follow up needed at this time.  Patient was assigned appropriate questionnaire to complete    Type of outreach:    Sent [facility] message. and [place] so will not contact for physical or labs. Pt was sent questionnaires only and if does not complete staff can close encounter.      Questions for provider review:    AAP and CSA    Deborah Bradley [facility]  Chart routed to Care Team.

## 2023-02-27 NOTE — TELEPHONE ENCOUNTER
Medication:   Requested Prescriptions     Pending Prescriptions Disp Refills    butalbital-acetaminophen-caffeine (FIORICET, ESGIC) -40 MG per tablet [Pharmacy Med Name: [id number] -40] 30 tablet 0     Sig: TAKE 1 TABLET BY MOUTH EVERY 6 HOURS AS NEEDED FOR HEADACHES        Last Filled:  4/28/2022    Patient Phone Number: [phone] (home) [phone] (work)    Last appt: 9/7/2021   Next appt: Visit date not found    Last OARRS: No flowsheet data found. Will flag for RN to send if appropriate. Medication pended and pharmacy updated   Katy Sheikh CMA

## 2023-03-17 NOTE — RESULT ENCOUNTER NOTE
Savana Burns    Your results were normal.     The results are attached for your review.       Zach Rosario PA-C  
Test results discussed with patient during office visit.     Aura Rosario PA-C
yes

## 2023-04-03 PROBLEM — Z87.898 HISTORY OF PSYCHOGENIC NONEPILEPTIC SEIZURE: Status: ACTIVE | Noted: 2017-07-01

## 2023-04-28 ENCOUNTER — APPOINTMENT (OUTPATIENT)
Dept: GENERAL RADIOLOGY | Facility: CLINIC | Age: 33
End: 2023-04-28
Attending: STUDENT IN AN ORGANIZED HEALTH CARE EDUCATION/TRAINING PROGRAM
Payer: COMMERCIAL

## 2023-04-28 ENCOUNTER — APPOINTMENT (OUTPATIENT)
Dept: ULTRASOUND IMAGING | Facility: CLINIC | Age: 33
End: 2023-04-28
Attending: STUDENT IN AN ORGANIZED HEALTH CARE EDUCATION/TRAINING PROGRAM
Payer: COMMERCIAL

## 2023-04-28 ENCOUNTER — HOSPITAL ENCOUNTER (EMERGENCY)
Facility: CLINIC | Age: 33
Discharge: HOME | End: 2023-04-28
Attending: STUDENT IN AN ORGANIZED HEALTH CARE EDUCATION/TRAINING PROGRAM | Admitting: STUDENT IN AN ORGANIZED HEALTH CARE EDUCATION/TRAINING PROGRAM
Payer: COMMERCIAL

## 2023-04-28 VITALS
DIASTOLIC BLOOD PRESSURE: 79 MMHG | HEART RATE: 86 BPM | TEMPERATURE: 98.6 F | SYSTOLIC BLOOD PRESSURE: 128 MMHG | OXYGEN SATURATION: 95 %

## 2023-04-28 DIAGNOSIS — R07.9 CHEST PAIN, UNSPECIFIED TYPE: ICD-10-CM

## 2023-04-28 LAB
ALBUMIN SERPL BCG-MCNC: 4.1 G/DL (ref 3.5–5.2)
ALP SERPL-CCNC: 95 U/L (ref 35–104)
ALT SERPL W P-5'-P-CCNC: 18 U/L (ref 10–35)
ANION GAP SERPL CALCULATED.3IONS-SCNC: 12 MMOL/L (ref 7–15)
AST SERPL W P-5'-P-CCNC: 16 U/L (ref 10–35)
BASOPHILS # BLD AUTO: 0 10E3/UL (ref 0–0.2)
BASOPHILS NFR BLD AUTO: 0 %
BILIRUB SERPL-MCNC: <0.2 MG/DL
BUN SERPL-MCNC: 17.8 MG/DL (ref 6–20)
CALCIUM SERPL-MCNC: 9.7 MG/DL (ref 8.6–10)
CHLORIDE SERPL-SCNC: 102 MMOL/L (ref 98–107)
CREAT SERPL-MCNC: 0.94 MG/DL (ref 0.51–0.95)
D DIMER PPP FEU-MCNC: 0.47 UG/ML FEU (ref 0–0.5)
DEPRECATED HCO3 PLAS-SCNC: 27 MMOL/L (ref 22–29)
EOSINOPHIL # BLD AUTO: 0.1 10E3/UL (ref 0–0.7)
EOSINOPHIL NFR BLD AUTO: 1 %
ERYTHROCYTE [DISTWIDTH] IN BLOOD BY AUTOMATED COUNT: 11.9 % (ref 10–15)
GFR SERPL CREATININE-BSD FRML MDRD: 81 ML/MIN/1.73M2
GLUCOSE SERPL-MCNC: 73 MG/DL (ref 70–99)
HCT VFR BLD AUTO: 39 % (ref 35–47)
HGB BLD-MCNC: 13.3 G/DL (ref 11.7–15.7)
IMM GRANULOCYTES # BLD: 0 10E3/UL
IMM GRANULOCYTES NFR BLD: 0 %
LACTATE SERPL-SCNC: 1.2 MMOL/L (ref 0.7–2)
LIPASE SERPL-CCNC: 34 U/L (ref 13–60)
LYMPHOCYTES # BLD AUTO: 2.2 10E3/UL (ref 0.8–5.3)
LYMPHOCYTES NFR BLD AUTO: 30 %
MCH RBC QN AUTO: 33.5 PG (ref 26.5–33)
MCHC RBC AUTO-ENTMCNC: 34.1 G/DL (ref 31.5–36.5)
MCV RBC AUTO: 98 FL (ref 78–100)
MONOCYTES # BLD AUTO: 0.8 10E3/UL (ref 0–1.3)
MONOCYTES NFR BLD AUTO: 11 %
NEUTROPHILS # BLD AUTO: 4.2 10E3/UL (ref 1.6–8.3)
NEUTROPHILS NFR BLD AUTO: 58 %
NRBC # BLD AUTO: 0 10E3/UL
NRBC BLD AUTO-RTO: 0 /100
PLATELET # BLD AUTO: 244 10E3/UL (ref 150–450)
POTASSIUM SERPL-SCNC: 4.1 MMOL/L (ref 3.4–5.3)
PROT SERPL-MCNC: 6.9 G/DL (ref 6.4–8.3)
RBC # BLD AUTO: 3.97 10E6/UL (ref 3.8–5.2)
SARS-COV-2 RNA RESP QL NAA+PROBE: NEGATIVE
SODIUM SERPL-SCNC: 141 MMOL/L (ref 136–145)
TROPONIN T SERPL HS-MCNC: <6 NG/L
WBC # BLD AUTO: 7.2 10E3/UL (ref 4–11)

## 2023-04-28 PROCEDURE — 99285 EMERGENCY DEPT VISIT HI MDM: CPT | Mod: 25 | Performed by: STUDENT IN AN ORGANIZED HEALTH CARE EDUCATION/TRAINING PROGRAM

## 2023-04-28 PROCEDURE — C9803 HOPD COVID-19 SPEC COLLECT: HCPCS | Performed by: STUDENT IN AN ORGANIZED HEALTH CARE EDUCATION/TRAINING PROGRAM

## 2023-04-28 PROCEDURE — 83690 ASSAY OF LIPASE: CPT | Performed by: STUDENT IN AN ORGANIZED HEALTH CARE EDUCATION/TRAINING PROGRAM

## 2023-04-28 PROCEDURE — 93010 ELECTROCARDIOGRAM REPORT: CPT | Performed by: STUDENT IN AN ORGANIZED HEALTH CARE EDUCATION/TRAINING PROGRAM

## 2023-04-28 PROCEDURE — 84484 ASSAY OF TROPONIN QUANT: CPT | Performed by: STUDENT IN AN ORGANIZED HEALTH CARE EDUCATION/TRAINING PROGRAM

## 2023-04-28 PROCEDURE — 99284 EMERGENCY DEPT VISIT MOD MDM: CPT | Mod: 25 | Performed by: STUDENT IN AN ORGANIZED HEALTH CARE EDUCATION/TRAINING PROGRAM

## 2023-04-28 PROCEDURE — 93005 ELECTROCARDIOGRAM TRACING: CPT | Performed by: STUDENT IN AN ORGANIZED HEALTH CARE EDUCATION/TRAINING PROGRAM

## 2023-04-28 PROCEDURE — 96375 TX/PRO/DX INJ NEW DRUG ADDON: CPT | Performed by: STUDENT IN AN ORGANIZED HEALTH CARE EDUCATION/TRAINING PROGRAM

## 2023-04-28 PROCEDURE — 96376 TX/PRO/DX INJ SAME DRUG ADON: CPT | Performed by: STUDENT IN AN ORGANIZED HEALTH CARE EDUCATION/TRAINING PROGRAM

## 2023-04-28 PROCEDURE — 85025 COMPLETE CBC W/AUTO DIFF WBC: CPT | Performed by: STUDENT IN AN ORGANIZED HEALTH CARE EDUCATION/TRAINING PROGRAM

## 2023-04-28 PROCEDURE — 96361 HYDRATE IV INFUSION ADD-ON: CPT | Performed by: STUDENT IN AN ORGANIZED HEALTH CARE EDUCATION/TRAINING PROGRAM

## 2023-04-28 PROCEDURE — 36415 COLL VENOUS BLD VENIPUNCTURE: CPT | Performed by: STUDENT IN AN ORGANIZED HEALTH CARE EDUCATION/TRAINING PROGRAM

## 2023-04-28 PROCEDURE — U0003 INFECTIOUS AGENT DETECTION BY NUCLEIC ACID (DNA OR RNA); SEVERE ACUTE RESPIRATORY SYNDROME CORONAVIRUS 2 (SARS-COV-2) (CORONAVIRUS DISEASE [COVID-19]), AMPLIFIED PROBE TECHNIQUE, MAKING USE OF HIGH THROUGHPUT TECHNOLOGIES AS DESCRIBED BY CMS-2020-01-R: HCPCS | Performed by: STUDENT IN AN ORGANIZED HEALTH CARE EDUCATION/TRAINING PROGRAM

## 2023-04-28 PROCEDURE — 96374 THER/PROPH/DIAG INJ IV PUSH: CPT | Performed by: STUDENT IN AN ORGANIZED HEALTH CARE EDUCATION/TRAINING PROGRAM

## 2023-04-28 PROCEDURE — 93970 EXTREMITY STUDY: CPT

## 2023-04-28 PROCEDURE — 85379 FIBRIN DEGRADATION QUANT: CPT | Performed by: STUDENT IN AN ORGANIZED HEALTH CARE EDUCATION/TRAINING PROGRAM

## 2023-04-28 PROCEDURE — 83605 ASSAY OF LACTIC ACID: CPT | Performed by: STUDENT IN AN ORGANIZED HEALTH CARE EDUCATION/TRAINING PROGRAM

## 2023-04-28 PROCEDURE — 71046 X-RAY EXAM CHEST 2 VIEWS: CPT

## 2023-04-28 PROCEDURE — 250N000011 HC RX IP 250 OP 636: Performed by: STUDENT IN AN ORGANIZED HEALTH CARE EDUCATION/TRAINING PROGRAM

## 2023-04-28 PROCEDURE — 258N000003 HC RX IP 258 OP 636: Performed by: STUDENT IN AN ORGANIZED HEALTH CARE EDUCATION/TRAINING PROGRAM

## 2023-04-28 PROCEDURE — 80053 COMPREHEN METABOLIC PANEL: CPT | Performed by: STUDENT IN AN ORGANIZED HEALTH CARE EDUCATION/TRAINING PROGRAM

## 2023-04-28 RX ORDER — ONDANSETRON 2 MG/ML
4 INJECTION INTRAMUSCULAR; INTRAVENOUS ONCE
Status: COMPLETED | OUTPATIENT
Start: 2023-04-28 | End: 2023-04-28

## 2023-04-28 RX ORDER — DIPHENHYDRAMINE HYDROCHLORIDE 50 MG/ML
25 INJECTION INTRAMUSCULAR; INTRAVENOUS ONCE
Status: COMPLETED | OUTPATIENT
Start: 2023-04-28 | End: 2023-04-28

## 2023-04-28 RX ADMIN — DIPHENHYDRAMINE HYDROCHLORIDE 25 MG: 50 INJECTION, SOLUTION INTRAMUSCULAR; INTRAVENOUS at 19:02

## 2023-04-28 RX ADMIN — HYDROMORPHONE HYDROCHLORIDE 1 MG: 1 INJECTION, SOLUTION INTRAMUSCULAR; INTRAVENOUS; SUBCUTANEOUS at 18:12

## 2023-04-28 RX ADMIN — SODIUM CHLORIDE 1000 ML: 0.9 INJECTION, SOLUTION INTRAVENOUS at 19:01

## 2023-04-28 RX ADMIN — ONDANSETRON 4 MG: 2 INJECTION INTRAMUSCULAR; INTRAVENOUS at 18:13

## 2023-04-28 RX ADMIN — HYDROMORPHONE HYDROCHLORIDE 1 MG: 1 INJECTION, SOLUTION INTRAMUSCULAR; INTRAVENOUS; SUBCUTANEOUS at 19:48

## 2023-04-28 ASSESSMENT — ACTIVITIES OF DAILY LIVING (ADL)
ADLS_ACUITY_SCORE: 37
ADLS_ACUITY_SCORE: 37

## 2023-04-28 NOTE — ED TRIAGE NOTES
Pt reports onset of CP and LE pain this morning.  Pt in car for 12hrs today [place].  Hx of previous PE.

## 2023-04-28 NOTE — ED PROVIDER NOTES
[facility] EMERGENCY DEPARTMENT ([facility])    4/28/23      ED PROVIDER NOTE    History     Chief Complaint   Patient presents with     Chest Pain     HPI  Katy Islas is a 33 year old female with past medical history significant for DVT, pneumonia, HTN, Crohn's disease, seizures, deafness who presents to the ED for chest pain and leg pain starting this morning.    Notes that she had a 12-hour drive this morning, and during her drive she started noticing some left anterior chest wall pain.  It sharp in nature, radiates to the left side of her chest and into her left shoulder.  Worse with deep breathing, moving her left shoulder, and palpation.  Short associated with mild shortness of breath, and a mild productive cough of green sputum as well as bilateral leg swelling and pain.  Is worried in light of her previous history of blood clots that this is a blood clot      Past Medical History  Past Medical History:   Diagnosis Date     Abdominal pain 10/31- 11/4/2005    [facility] admit for Crohn's     Acute ulcerative colitis (H)      Allergic rhinitis, cause unspecified     Allergic rhinitis     Arthritis      C. difficile diarrhea     Past, no current diarrhea.     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, without mention of intractable epilepsy     pseudoseizures diagnosed after extensive neurologic eval     Migraine 07/21/12    D/C 07/22/12-Serena Bondet     Migraine, unspecified, without mention of intractable migraine  without mention of status migrainosus     Migraine     Mild intermittent asthma     mild intermittent     Mycoplasma infection in conditions classified elsewhere and of unspecified site      Other chronic pain     Back pain for 6 years     Renal disease     Kidney stones     Unspecified hearing loss     congenital hearing loss     Past Surgical History:   Procedure Laterality Date     AS REMOVAL OF COCCYX  10/18/2017     COLONOSCOPY  7/1/2009    [facility] [place].     COLONOSCOPY N/A 7/17/2019    Procedure: Colonoscopy, With Polypectomy And Biopsy;  Surgeon: Edwin Conde MD;  Location: [facility]     COLONOSCOPY N/A 1/11/2022    Procedure: COLONOSCOPY, WITH POLYPECTOMY AND BIOPSY;  Surgeon: Bob Ugarte DO;  Location: [facility]     COLONOSCOPY WITH CO2 INSUFFLATION N/A 7/17/2019    Procedure: COLONOSCOPY, WITH CO2 INSUFFLATION;  Surgeon: Edwin Conde MD;  Location: [facility]     COMBINED ESOPHAGOSCOPY, GASTROSCOPY, DUODENOSCOPY (EGD) WITH CO2 INSUFFLATION N/A 4/12/2019    Procedure: COMBINED ESOPHAGOSCOPY, GASTROSCOPY, DUODENOSCOPY (EGD) WITH CO2 INSUFFLATION;  Surgeon: Edwin Conde MD;  Location: [facility]     ESOPHAGOSCOPY, GASTROSCOPY, DUODENOSCOPY (EGD), COMBINED N/A 4/12/2019    Procedure: Combined Esophagoscopy, Gastroscopy, Duodenoscopy (Egd), Biopsy Single Or Multiple;  Surgeon: Edwin Conde MD;  Location: [facility]     FISTULOTOMY RECTUM N/A 4/30/2020    Procedure: EXAM UNDER ANESTHESIA, ANUS, parital fistulotomy;  Surgeon: Valentin Sanchez MD;  [facility]     FUSION SPINE POSTERIOR MINIMALLY INVASIVE ONE LEVEL N/A 2/23/2017    Procedure: FUSION SPINE POSTERIOR MINIMALLY INVASIVE ONE LEVEL;  L4-5 Oblique Lateral Lumbar Interbody Fusion   Epidural steroid injection.   Transpedicular Bone marrow aspiration;  Surgeon: Jeniffer Eugene MD;  Location: [facility] OR     HC EEG AWAKE AND SLEEP      abnormal     HC MRI BRAIN W/O CONTRAST  12/00    normal      HC REMOVAL GALLBLADDER  8/5/2009    [facility] [place]     HC UGI ENDOSCOPY DIAG W BIOPSY  11/11/09    Normal esophagus     ORTHOPEDIC SURGERY  October 19,2011    diskectomy L4-L5     ZZC FUSION OF SACROILIAC JOINT  10/26/2018     ZZ COLONOSCOPY THRU STOMA WITH BIOPSY  10/29/2003    Impression is that of normal appearing colonoscopy, without evidence of rectal bleeding.     ZZ COLONOSCOPY THRU STOMA, DIAGNOSTIC  10/00    normal     [facility]  Oct [facility]    Dr López- normal     [facility] UGI ENDOSCOPY, SIMPLE EXAM  7/00, 10/00    mild chronic esophagitis and duodenitis, neg H pylori     [facility]  01/20/2005    Esophagogastroduodenoscopy, colonoscopy with biopsies.  [facility] UGI ENDOSCOPY, SIMPLE EXAM  7/1/2009    [facility] [place]     [facility] UGI ENDOSCOPY, SIMPLE EXAM  11/11/2009    attempted upper GI, pt. could not tolerate procedure:[facility]     acetaminophen-codeine (TYLENOL #3) 300-30 MG tablet  albuterol (PROAIR HFA/PROVENTIL HFA/VENTOLIN HFA) 108 (90 Base) MCG/ACT inhaler  apixaban ANTICOAGULANT (ELIQUIS) 2.5 MG tablet  ARIPiprazole (ABILIFY) 30 MG tablet  cyclobenzaprine (FLEXERIL) 10 MG tablet  diclofenac (VOLTAREN) 1 % topical gel  dicyclomine (BENTYL) 20 MG tablet  DULoxetine (CYMBALTA) 30 MG capsule  EPINEPHrine (ANY BX GENERIC EQUIV) 0.3 MG/0.3ML injection 2-pack  gabapentin (NEURONTIN) 300 MG capsule  lidocaine (LIDODERM) 5 % patch  lidocaine, viscous, (XYLOCAINE) 2 % solution  menthol-zinc oxide (CALMOSEPTINE) 0.44-20.625 % OINT ointment  mesalamine (LIALDA) 1.2 g EC tablet  mesalamine (ROWASA) 4 g enema  NIFEdipine ER OSMOTIC (PROCARDIA XL) 60 MG 24 hr tablet  ondansetron (ZOFRAN) 4 MG tablet  pantoprazole (PROTONIX) 40 MG EC tablet  polyethylene glycol (MIRALAX) 17 g packet  sucralfate (CARAFATE) 1 GM tablet  valACYclovir (VALTREX) 1000 mg tablet      Allergies   Allergen Reactions      Dexamethasone Unknown     Iohexol Hives     Other reaction(s): Hives  IV contrast; patient states she tolerates contrast if she gets benadryl before  IV contrast; patient states she tolerates contrast if she gets benadryl before     Nsaids Other (See Comments) and Hives     GI BLEED x2  Tolerated toradol     Other Environmental Allergy Rash     Plastic tape     Acetaminophen Itching     Baclofen      hives     Bees Hives, Swelling and Difficulty breathing     Caffeine      Contrast Dye      Hives,   Updated 5/10/2016 CT Contrast.     Iodine Hives     Metoclopramide      Other reaction(s): Tremors  LW Reaction: shaking/sweating     Midazolam      Other reaction(s): Agitation     Midazolam Hcl Other (See Comments)     Monosodium Glutamate      Morphine Other (See Comments)     Difficulty with urination     Other (Do Not Use) Other (See Comments)     Xanaflex- pt becomes disoriented and loses bladder control     Reglan [Metoclopramide Hcl] Other (See Comments)     shaking     Soma [Carisoprodol] Visual Disturbance     Sleep walking     Tizanidine      Topamax Other (See Comments)     Topamax [Topiramate] Nausea     Tingling  GI/Vomit     Tramadol      Severe Headache, Seizure Risk     Tylenol W/Codeine [Acetaminophen-Codeine] Nausea and Itching     Tylenol 3     Zolmitriptan      Makes face feel like its twitching     Droperidol Anxiety     Hydrocodone-Acetaminophen Nausea and Vomiting and Itching     Pt denies allergy 1/3/11  Pt denies 10/2/18     Influenza Virus Vaccine Rash      Arm swelling     Family History  Family History   Problem Relation Age of Onset     Gastrointestinal Disease Brother         severe Crohn's     Neurologic Disorder Brother         Seizures post head injury     Depression Brother      Substance Abuse Brother      Genitourinary Problems Father         kidney stones     Diabetes Father      Heart Disease Father         Open heart surgery     Breast Cancer Maternal Grandmother       Parkinsonism Maternal Grandmother      Cerebrovascular Disease Paternal Grandmother      Cancer Maternal Grandfather         Lung     Cardiovascular Paternal Grandfather         Heart Attack     Substance Abuse Mother         in recovery x1 year      Lung Cancer Paternal Uncle      Cancer Paternal Uncle      Lung Cancer Maternal Uncle      Colon Cancer Maternal Uncle      Social History   Social History     Tobacco Use     Smoking status: Former     Packs/day: 0.25     Years: 5.00     Pack years: 1.25     Types: Cigarettes     Smokeless tobacco: Never   Vaping Use     Vaping status: Former     Substances: CBD     Devices: Disposable, Pre-filled or refillable cartridge   Substance Use Topics     Alcohol use: Not Currently     Comment: Not since last July 2018     Drug use: Not Currently     Types: Marijuana     Comment: Medical Marijuana currently-- More CBD         A medically appropriate review of systems was performed with pertinent positives and negatives noted in the HPI, and all other systems negative.    Physical Exam   BP: 128/79  Pulse: 86  Temp: 98.6  F (37  C)  SpO2: 96 %  Physical Exam  GEN: Well appearing, non toxic, cooperative  HEENT: normocephalic and atraumatic, PERRLA, EOMI  CV: well-perfused, normal skin color for ethnicity, sinus tachycardia to 110; left anterior and mid axillary chest wall significantly tender to palpation, pain elicited on the left chest wall with moving the arm  PULM: breathing comfortably, in no respiratory distress, clear to auscultation upper and lower lung field  ABD: nondistended  EXT: Full range of motion.  No edema.  Equal bilateral lower extremities  NEURO: awake, conversant, grossly normal bilateral upper and lower extremity strength & ROM   SKIN: No rashes, ecchymosis, or lacerations  PSYCH: Calm and cooperative, interactive      ED Course, Procedures, & Data      Procedures       ED Course Selections:        EKG Interpretation:      Interpreted by Lanette Terrazas  MD  Time reviewed: 1805  Symptoms at time of EKG: chest pain   Rhythm: normal sinus   Rate: normal  Axis: normal  Ectopy: none  Conduction: short AK  ST Segments/ T Waves: No ST-T wave changes  Q Waves: none  Comparison to prior: Unchanged    Clinical Impression: normal EKG       Results for orders placed or performed during the hospital encounter of 04/28/23   XR Chest 2 Views     Status: None    Narrative    EXAM: XR CHEST 2 VIEWS  LOCATION: [facility]  DATE/TIME: 4/28/2023 7:45 PM CDT    INDICATION: chest pain, shortness of breath  COMPARISON: 01/20/2022      Impression    IMPRESSION: Low lung volumes. Heart size and pulmonary vascularity normal. Subsegmental atelectasis left base, lungs otherwise clear. Epidural stimulator wires mid thoracic spine.   US Lower Extremity Venous Duplex Bilateral     Status: None    Narrative    EXAM: US LOWER EXTREMITY VENOUS DUPLEX BILATERAL  LOCATION: [facility]  DATE/TIME: 4/28/2023 8:16 PM CDT    INDICATION: Pain and swelling.  COMPARISON: None.  TECHNIQUE: Venous Duplex ultrasound of bilateral lower extremities with and without compression, augmentation and duplex. Color flow and spectral Doppler with waveform analysis performed.    FINDINGS: Exam includes the common femoral, femoral, popliteal veins as well as segmentally visualized deep calf veins and greater saphenous vein.     RIGHT: No deep vein thrombosis.  No popliteal cyst.    LEFT: No deep vein thrombosis.  No popliteal cyst.      Impression    IMPRESSION:  1.  No deep venous thrombosis in the bilateral lower extremities.   Comprehensive metabolic panel     Status: Normal   Result Value Ref Range    Sodium 141 136 - 145 mmol/L    Potassium 4.1 3.4 - 5.3 mmol/L    Chloride 102 98 - 107 mmol/L    Carbon Dioxide (CO2) 27 22 - 29 mmol/L    Anion Gap 12 7 - 15 mmol/L    Urea Nitrogen 17.8 6.0 - 20.0 mg/dL    Creatinine 0.94 0.51 -  0.95 mg/dL    Calcium 9.7 8.6 - 10.0 mg/dL    Glucose 73 70 - 99 mg/dL    Alkaline Phosphatase 95 35 - 104 U/L    AST 16 10 - 35 U/L    ALT 18 10 - 35 U/L    Protein Total 6.9 6.4 - 8.3 g/dL    Albumin 4.1 3.5 - 5.2 g/dL    Bilirubin Total <0.2 <=1.2 mg/dL    GFR Estimate 81 >60 mL/min/1.73m2   Lipase     Status: Normal   Result Value Ref Range    Lipase 34 13 - 60 U/L   Troponin T, High Sensitivity     Status: Normal   Result Value Ref Range    Troponin T, High Sensitivity <6 <=14 ng/L   Lactic acid whole blood     Status: Normal   Result Value Ref Range    Lactic Acid 1.2 0.7 - 2.0 mmol/L   D dimer quantitative     Status: Normal   Result Value Ref Range    D-Dimer Quantitative 0.47 0.00 - 0.50 ug/mL FEU    Narrative    This D-dimer assay is intended for use in conjunction with a clinical pretest probability assessment model to exclude pulmonary embolism (PE) and deep venous thrombosis (DVT) in outpatients suspected of PE or DVT. The cut-off value is 0.50 ug/mL FEU.   Asymptomatic COVID-19 Virus (Coronavirus) by PCR Nasopharyngeal     Status: Normal    Specimen: Nasopharyngeal; Swab   Result Value Ref Range    SARS CoV2 PCR Negative Negative    Narrative    Testing was performed using the Xpert Xpress SARS-CoV-2 Assay on the Cepheid Gene-Xpert Instrument Systems. Additional information about this Emergency Use Authorization (EUA) assay can be found via the Lab Guide. This test should be ordered for the detection of SARS-CoV-2 in individuals who meet SARS-CoV-2 clinical and/or epidemiological criteria as well as from individuals without symptoms or other reasons to suspect COVID-19. Test performance for asymptomatic patients has only been established in anterior nasal swab specimens. This test is for in vitro diagnostic use under the FDA EUA for laboratories certified under CLIA to perform high complexity testing. This test has not been FDA cleared or approved. A negative result does not rule out the presence of  PCR inhibitors in the specimen or target RNA concentration below the limit of detection for the assay. The possibility of a false negative should be considered if the patient's recent exposure or clinical presentation suggests COVID-19. This test was validated by the [facility]. This laboratory is certified under the Clinical Laboratory Improvement Amendments (CLIA) as qualified to perform high complexity laboratory testing.     CBC with platelets and differential     Status: Abnormal   Result Value Ref Range    WBC Count 7.2 4.0 - 11.0 10e3/uL    RBC Count 3.97 3.80 - 5.20 10e6/uL    Hemoglobin 13.3 11.7 - 15.7 g/dL    Hematocrit 39.0 35.0 - 47.0 %    MCV 98 78 - 100 fL    MCH 33.5 (H) 26.5 - 33.0 pg    MCHC 34.1 31.5 - 36.5 g/dL    RDW 11.9 10.0 - 15.0 %    Platelet Count 244 150 - 450 10e3/uL    % Neutrophils 58 %    % Lymphocytes 30 %    % Monocytes 11 %    % Eosinophils 1 %    % Basophils 0 %    % Immature Granulocytes 0 %    NRBCs per 100 WBC 0 <1 /100    Absolute Neutrophils 4.2 1.6 - 8.3 10e3/uL    Absolute Lymphocytes 2.2 0.8 - 5.3 10e3/uL    Absolute Monocytes 0.8 0.0 - 1.3 10e3/uL    Absolute Eosinophils 0.1 0.0 - 0.7 10e3/uL    Absolute Basophils 0.0 0.0 - 0.2 10e3/uL    Absolute Immature Granulocytes 0.0 <=0.4 10e3/uL    Absolute NRBCs 0.0 10e3/uL   EKG 12-lead, tracing only     Status: None (Preliminary result)   Result Value Ref Range    Systolic Blood Pressure  mmHg    Diastolic Blood Pressure  mmHg    Ventricular Rate 94 BPM    Atrial Rate 94 BPM    CA Interval 104 ms    QRS Duration 96 ms     ms    QTc 447 ms    P Axis 18 degrees    R AXIS 45 degrees    T Axis 38 degrees    Interpretation ECG Sinus rhythm with short CA  Otherwise normal ECG      CBC with platelets differential     Status: Abnormal    Narrative    The following orders were created for panel order CBC with platelets differential.  Procedure                               Abnormality          Status                     ---------                               -----------         ------                     CBC with platelets and d...[[id number]]  Abnormal            Final result                 Please view results for these tests on the individual orders.     Medications   HYDROmorphone (DILAUDID) injection 1 mg (1 mg Intravenous $Given 4/28/23 1812)   ondansetron (ZOFRAN) injection 4 mg (4 mg Intravenous $Given 4/28/23 1813)   diphenhydrAMINE (BENADRYL) injection 25 mg (25 mg Intravenous $Given 4/28/23 1902)   0.9% sodium chloride BOLUS (1,000 mLs Intravenous $New Bag 4/28/23 1901)   HYDROmorphone (DILAUDID) injection 1 mg (1 mg Intravenous $Given 4/28/23 1948)     Labs Ordered and Resulted from Time of ED Arrival to Time of ED Departure   CBC WITH PLATELETS AND DIFFERENTIAL - Abnormal       Result Value    WBC Count 7.2      RBC Count 3.97      Hemoglobin 13.3      Hematocrit 39.0      MCV 98      MCH 33.5 (*)     MCHC 34.1      RDW 11.9      Platelet Count 244      % Neutrophils 58      % Lymphocytes 30      % Monocytes 11      % Eosinophils 1      % Basophils 0      % Immature Granulocytes 0      NRBCs per 100 WBC 0      Absolute Neutrophils 4.2      Absolute Lymphocytes 2.2      Absolute Monocytes 0.8      Absolute Eosinophils 0.1      Absolute Basophils 0.0      Absolute Immature Granulocytes 0.0      Absolute NRBCs 0.0     COMPREHENSIVE METABOLIC PANEL - Normal    Sodium 141      Potassium 4.1      Chloride 102      Carbon Dioxide (CO2) 27      Anion Gap 12      Urea Nitrogen 17.8      Creatinine 0.94      Calcium 9.7      Glucose 73      Alkaline Phosphatase 95      AST 16      ALT 18      Protein Total 6.9      Albumin 4.1      Bilirubin Total <0.2      GFR Estimate 81     LIPASE - Normal    Lipase 34     TROPONIN T, HIGH SENSITIVITY - Normal    Troponin T, High Sensitivity <6     LACTIC ACID WHOLE BLOOD - Normal    Lactic Acid 1.2     D DIMER QUANTITATIVE - Normal    D-Dimer Quantitative 0.47      COVID-19 VIRUS (CORONAVIRUS) BY PCR - Normal    SARS CoV2 PCR Negative       US Lower Extremity Venous Duplex Bilateral   Final Result   IMPRESSION:   1.  No deep venous thrombosis in the bilateral lower extremities.      XR Chest 2 Views   Final Result   IMPRESSION: Low lung volumes. Heart size and pulmonary vascularity normal. Subsegmental atelectasis left base, lungs otherwise clear. Epidural stimulator wires mid thoracic spine.             Critical care was not performed.     Medical Decision Making  The patient's presentation was of moderate complexity (an acute illness with systemic symptoms).    The patient's evaluation involved:  review of external note(s) from 3+ sources (see separate area of note for details)  ordering and/or review of 3+ test(s) in this encounter (see separate area of note for details)  review of 3+ test result(s) ordered prior to this encounter (see separate area of note for details)    The patient's management necessitated high risk (a decision regarding hospitalization).      Assessment & Plan    33-year-old female with a history of deafness using ASL, DVTs, pneumonia and Crohn's disease presenting to the emergency department due to a 1 day history of increasing left anterior sharp pleuritic chest wall pain radiating to the left anterior and mid axillary chest with associated bilateral leg subjective swelling and tenderness noted to have sinus tachycardia with no hypoxia.    We will plan for D-dimer to evaluate for PE.  Also plan for ultrasound to evaluate for DVT, however no unilateral swelling, lower suspicion for DVT.  She is tender basically throughout the entire leg on both sides, with no point tenderness with low suspicion of an acute intra-articular injury or any bony abnormality.  Doubt ACS with the story and no significant risk factors aside from hypertension.  Low suspicion for aortic dissection.  Considered pneumonia especially in light of her productive green cough.  Will  evaluate with chest x-ray.    Patient has multiple allergies listed, including to Tylenol and Motrin.  Notes the only thing that works for her is Dilaudid.  She does have some drug-seeking behavior in the past, however at this time with concerning history, will plan for a dose of IV Dilaudid.    8:48 PM patient received another dose of Dilaudid prior to going to her imaging.  Her lab work here is normal, ultrasound is normal, and her chest x-ray demonstrates atelectasis.  Could certainly be the cause of her pain, however most of it seems to be musculoskeletal especially as this worse with moving her arm around.  At this time, I do not believe that this is pneumonia, although it could be early in the course that she is not having a fever, and no definitive lobar pneumonia noted.  We will plan for discharge and follow-up with primary care doctor.    I have reviewed the nursing notes. I have reviewed the findings, diagnosis, plan and need for follow up with the patient.    New Prescriptions    No medications on file       Final diagnoses:   Chest pain, unspecified type       Lanette Terrazas MD  [facility] EMERGENCY DEPARTMENT  4/28/2023     Lanette Terrazas MD  04/28/23 2049

## 2023-04-29 LAB
ATRIAL RATE - MUSE: 94 BPM
DIASTOLIC BLOOD PRESSURE - MUSE: NORMAL MMHG
INTERPRETATION ECG - MUSE: NORMAL
P AXIS - MUSE: 18 DEGREES
PR INTERVAL - MUSE: 104 MS
QRS DURATION - MUSE: 96 MS
QT - MUSE: 358 MS
QTC - MUSE: 447 MS
R AXIS - MUSE: 45 DEGREES
SYSTOLIC BLOOD PRESSURE - MUSE: NORMAL MMHG
T AXIS - MUSE: 38 DEGREES
VENTRICULAR RATE- MUSE: 94 BPM

## 2023-04-29 NOTE — DISCHARGE INSTRUCTIONS
You were seen in the ED today due to pain in your chest and your legs.  You had labs here, x-ray, EKG, and ultrasound.  All of your tests are normal, and at this time, the cause of your pain is likely musculoskeletal.    If you begin to have worsening difficulty breathing, especially with worsening productive cough, or fevers, we would recommend reevaluation.  On your chest x-ray, it appears that part of your lung is not fully opened which typically happens if you are not taking large enough breaths.  However, this can sometimes be in a very early sign of pneumonia.    Instructions from your doctor today:  Emergency Department testing is focused on the potential causes of your symptoms that are the most dangerous possibilities, and cannot cover every possibility. Based on the evaluation, it was deemed sufficiently safe to discharge and continue management through the clinics. Thus, follow-up is very important to assess for improvement/worsening, potential further testing, and potential treatment adjustments. If you were given opioid pain medications or other medications that can make you drowsy while in the ED, you should not drive for at least several hours and not until you feel completely back to normal.     Please make an appointment to follow up with:  - Your Primary Care Provider in 2-5 days as needed.  - If you do not have a primary care provider, you can be seen in follow-up and establish care by calling any of the clinics below:     - [facility] (phone: [phone])     - [facility] (phone: [phone])   - Have your clinic provider review the results from today's visit with you again, including any potential follow-up or additional testing that may be needed based on the results. Occasionally, incidental findings are found on later review by radiologists that may need follow-up.     Return to the Emergency Department immediately if you have worsening  difficulty breathing, shortness of breath, chest pain, swelling in your leg, or any other concerning symptoms, or any other urgent or potentially life-threatening concerns.      show

## 2023-06-03 NOTE — TELEPHONE ENCOUNTER
"CONSULTATION LIAISON PSYCHIATRY INITIAL EVALUATION    Patient Name: Janie Zamorano  MRN: [id number]  Patient Class: OP- Observation  Admission Date: 6/1/2023  Attending Physician: Charity Mcgee MD      HPI:   Janie Zamorano is a 65 y.o. female with past psychiatric history of encephalopathy MDD with catatonia (requiring ECT), neurocognitive disorder, intracranial mass & past pertinent medical history of multiple myeloma, GLENIS, lung mass and intracranial mass presents to the ED/admitted to the hospital for Vasovagal syncope, fatigue and emesis.   The patient reportedly slumped over at home into a chair. Her son helped her to the ground where she vomited and had an episode of urinary incontinence. She was unresponsive for several minutes.  EMS called and found that her blood pressure was low.     A similar event happened in late February.  She was admitted after a syncopal event with concern for sepsis.  At that time, there was  concern that her sinuses could be the source.  Her blood pressure medications were discontinued upon discharge to prevent further episodes of syncope.      Psychiatry consulted for "depression, anxiety, and management of schizophrenia"    On psych exam, pt drowsy, minimally engaged with blunted affect. Reports that she feels much better than yesterday. bCAM(+); Oriented only to self, place, year, situation; disoriented to month ("august"), day ("Monday"), president; answers disorganization questions correctly however unable to state months of year in reverse order. States that yesterday she felt "good but not excellent". States she's here for heart testing including an echo. States she isn't sleeping well but then also states she feels well rested with more energy. Reports her mood as "calm". Denies anxiety, depression, current or previous SI. Denies AVH but states that people always tell her that she has hallucinations. States she lives with son. Denies having issues with her " She did not mention any urinary symptoms during today's exam. Recommend UC/ED if concerned about UTI.     Zach Rosario PA-C  [facility]      medicine. Interview limited 2/2 low engagement.     Patient was recently admitted to [facility] for about a month in April 2023. Presented to the ED at that time for mental status changes. She was followed by the  psychiatry service there for acute encephalopathy in the setting of new brain mass findings on CT and MRI. Patient with history of malignancy (multiple myeloma) and etiology of mass unclear. Was evaluated by ENT and Neurosurgery who suggested outpatient workup and biopsy, did not feel that it was contributing to her mental status. Throughout her admission, patient demonstrated some behavioral disturbances and was started on risperdal-->zyprexa for suspected delirium in the setting of neurocognitive deficits. Patient then started to display s/s of catatonia (pt with history of this in the past requiring ECT) and was given an ativan challenge with good response. Transferred to inpatient psychiatry for several days at [facility], but quickly readmitted to the floor given medical complexity. Patient continued to improve throughout admission on Remeron 30 nightly, zyprexa 2.5 nightly and Ativan 0.5 BID and was discharged close to her baseline level of functioning.       Collateral with patient's permission:   Spoke with patient's son, Cameron ([phone]) at length. Says that since mother's discharge from [facility], she had been doing fairly well, still some difficulty eating and being med compliant but no major behavioral issues. Patient was recently discharged from an extensive admission at [facility] with psych meds including Ativan 0.5 BID for catatonia, Zyprexa 2.5 nightly, and Remeron 30 mg nightly. Mother has intermittently been refusing her medications and he worries she is not understanding the importance of her medications. She also is not eating as well as she should but he denies any significant weight loss. Mother requires assistance with most of her ADLs. Patient becomes confused at times and will talk about things that  "do not make sense or things that are not real, +AVH. At times, mother displays +echolalia (repeats things back to him that he says) and will stare off into space, not responding to anyone. Unclear if this coordinates with days that she misses her ativan.       Medical Review of Systems:  Pertinent items are noted in HPI.    Psychiatric Review of System/s (is patient experiencing or having changes in):  sleep: no  appetite: yes  weight: no  energy/anergy: no  interest/pleasure/anhedonia: no  somatic symptoms: no  anxiety/panic: no  guilty/hopelessness: no  concentration: no  Mandie:no  Psychosis: no  Trauma: no  S.I.B.s/risky behavior: no    Past Psychiatric History:  Previous Medication Trials: yes, Discharged from [facility] on Remeron 30, zyprexa 2.5, ativan 0.5 BID  Previous Psychiatric Hospitalizations:yes   Previous Suicide Attempts: no  History of Violence: no  Outpatient Psychiatrist: has follow scheduled June 6th with [facility] resident [facility] at [facility] (Stephania Hernandez)  Family Psychiatric History: yes    Substance Abuse History (with emphasis over the last 12 months):  Recreational Drugs:  none  Use of Alcohol: denied  Tobacco Use:no  Rehab History:no    Social History:  Marital Status: not   Children: yes  Employment Status/Info: retired  :no  Education:  college  Special Ed: no  Housing Status: with son  Access to gun: no  Psychosocial Stressors: health  Functioning Relationships: good support system    Legal History:  Past Charges/Incarcerations: no  Pending charges:no    Mental Status Exam:  General Appearance: unremarkable  Behavior:  sleeping throughout interview, minimally engaged  Involuntary Movements and Motor Activity: no abnormal involuntary movements noted; no tics, no tremors, no akathisia, no dystonia, no evidence of tardive dyskinesia; no psychomotor agitation or retardation  Gait and Station: unable to assess - patient lying down or seated  Speech and Language:  impoverished  Mood: "calm; " "better"  Affect: blunted  Thought Process and Associations:  paucity of thought  Thought Content and Perceptions:: no suicidal or homicidal ideation, no auditory or visual hallucinations, no paranoid ideation, no ideas of reference, no evidence of delusions or psychosis  Sensorium and Orientation: oriented to person and place, year, situation; disoriented to president, month, day  Recent and Remote Memory: recent memory impaired, remote memory impaired  Attention and Concentration: easily distractible, unable to spell WORLD forwards or backwards  Fund of Knowledge: grossly intact  Insight: poor  Judgment: intact, behavior is adequate/appropriate to the circumstances, compliant with health provider's recommendations and instructions    CAM ICU positive? yes      ASSESSMENT & RECOMMENDATIONS   Major Depressive Disorder, with catatonic and psychotic features    MDD  PSYCH MEDICATIONS  Continue home Remeron 30 mg HS  Continue home Ativan 0.5 mg BID  RESTART home Zyprexa 2.5 mg HS    RISK ASSESSMENT  NO NEED FOR PEC patient NOT in any imminent danger of hurting self or others and not gravely disabled.     FOLLOW UP  Will follow up while in house    DISPOSITION - once medically cleared:   Defer to medical team    Please contact ON CALL psychiatry service (24/7) for any acute issues that may arise.    Case discussed with staff: Dr. Zhang Wiggins, DO  CL Psychiatry  [facility]  6/3/2023 7:53 PM        --------------------------------------------------------------------------------------------------------------------------------------------------------------------------------------------------------------------------------------    CONTINUED HISTORY & OBJECTIVE clinical data & findings reviewed and noted for above decision making    Past Medical/Surgical History:   Past Medical History:   Diagnosis Date    Anxiety     Asthma     Bronchitis     COPD (chronic obstructive pulmonary disease)     " Depression     GERD (gastroesophageal reflux disease)     Hallucination     History of psychiatric hospitalization     Hypertension     Neck pain     Polyneuropathy in diseases classified elsewhere 2021    Schizophrenia     Sleep difficulties      Past Surgical History:   Procedure Laterality Date     SECTION      X 1    COLONOSCOPY N/A 2018    Surgeon: Albert Russell MD    ESOPHAGOGASTRODUODENOSCOPY N/A 2018    Surgeon: Albert Russell MD    HYSTERECTOMY  2016    [facility]---DLH/BSO    LIPOMA RESECTION      back  x 2    SALPINGOOPHORECTOMY Bilateral 2016    [facility]---DLH/BSO    THORACIC LAMINECTOMY WITH FUSION N/A 2018    Surgeon: He Andres MD       Current Medications:   Scheduled Meds:    ascorbic acid (vitamin C)  1,000 mg Oral Daily    fluticasone furoate-vilanteroL  1 puff Inhalation Daily    LORazepam  0.5 mg Oral BID    mirtazapine  30 mg Oral QHS    multivitamin  1 tablet Oral Daily    polyethylene glycol  17 g Oral Daily    tamsulosin  0.4 mg Oral Daily    vitamin D  1,000 Units Oral Daily     PRN Meds: acetaminophen, calcium carbonate, capsaicin, dextrose 10%, dextrose 10%, dextrose, dextrose, glucagon (human recombinant), hyoscyamine, melatonin, metoclopramide HCl, naloxone, OLANZapine, oxyCODONE, prochlorperazine, senna-docusate 8.6-50 mg, sodium chloride 0.9%  OTC Meds:     Allergies:   Review of patient's allergies indicates:   Allergen Reactions    Iodine Hives    Shellfish containing products Itching    Ondansetron hcl Nausea Only       Vitals  Vitals:    23 1154   BP:    Pulse: 90   Resp:    Temp:        Labs/Imaging/Studies:  Recent Results (from the past 24 hour(s))   TSH    Collection Time: 23  6:39 PM   Result Value Ref Range    TSH 0.225 (L) 0.400 - 4.000 uIU/mL   Iron and TIBC    Collection Time: 23  6:39 PM   Result Value Ref Range    Iron 42 30 - 160 ug/dL    Transferrin 217 200 - 375 mg/dL    TIBC 321 250 - 450 ug/dL    Saturated Iron 13 (L) 20  - 50 %   Ferritin    Collection Time: 06/02/23  6:39 PM   Result Value Ref Range    Ferritin 51 20.0 - 300.0 ng/mL   Hemoglobin A1c    Collection Time: 06/02/23  6:39 PM   Result Value Ref Range    Hemoglobin A1C 4.9 4.0 - 5.6 %    Estimated Avg Glucose 94 68 - 131 mg/dL   T4, Free    Collection Time: 06/02/23  6:39 PM   Result Value Ref Range    Free T4 1.12 0.71 - 1.51 ng/dL   Prolactin    Collection Time: 06/02/23  9:44 PM   Result Value Ref Range    Prolactin 12.8 5.2 - 26.5 ng/mL   Follicle stimulating hormone    Collection Time: 06/02/23  9:44 PM   Result Value Ref Range    Follicle Stimulating Hormone 3.70 See Text mIU/mL   Luteinizing hormone    Collection Time: 06/02/23  9:44 PM   Result Value Ref Range    LH <0.2 See Text mIU/mL   Cortisol, 8AM    Collection Time: 06/02/23  9:44 PM   Result Value Ref Range    Cortisol, 8 AM 7.40 4.30 - 22.40 ug/dL   Comprehensive Metabolic Panel (CMP)    Collection Time: 06/03/23  2:56 AM   Result Value Ref Range    Sodium 138 136 - 145 mmol/L    Potassium 4.6 3.5 - 5.1 mmol/L    Chloride 106 95 - 110 mmol/L    CO2 22 (L) 23 - 29 mmol/L    Glucose 104 70 - 110 mg/dL    BUN 22 8 - 23 mg/dL    Creatinine 1.3 0.5 - 1.4 mg/dL    Calcium 9.1 8.7 - 10.5 mg/dL    Total Protein 5.5 (L) 6.0 - 8.4 g/dL    Albumin 3.4 (L) 3.5 - 5.2 g/dL    Total Bilirubin 0.3 0.1 - 1.0 mg/dL    Alkaline Phosphatase 66 55 - 135 U/L    AST 15 10 - 40 U/L    ALT 6 (L) 10 - 44 U/L    Anion Gap 10 8 - 16 mmol/L    eGFR 45.4 (A) >60 mL/min/1.73 m^2   Magnesium    Collection Time: 06/03/23  2:56 AM   Result Value Ref Range    Magnesium 2.1 1.6 - 2.6 mg/dL   Phosphorus    Collection Time: 06/03/23  2:56 AM   Result Value Ref Range    Phosphorus 3.3 2.7 - 4.5 mg/dL   CBC with Automated Differential    Collection Time: 06/03/23  2:56 AM   Result Value Ref Range    WBC 2.46 (L) 3.90 - 12.70 K/uL    RBC 3.70 (L) 4.00 - 5.40 M/uL    Hemoglobin 10.4 (L) 12.0 - 16.0 g/dL    Hematocrit 33.6 (L) 37.0 - 48.5 %    MCV  91 82 - 98 fL    MCH 28.1 27.0 - 31.0 pg    MCHC 31.0 (L) 32.0 - 36.0 g/dL    RDW 14.4 11.5 - 14.5 %    Platelets 177 150 - 450 K/uL    MPV 10.8 9.2 - 12.9 fL    Immature Granulocytes 0.4 0.0 - 0.5 %    Gran # (ANC) 1.8 1.8 - 7.7 K/uL    Immature Grans (Abs) 0.01 0.00 - 0.04 K/uL    Lymph # 0.4 (L) 1.0 - 4.8 K/uL    Mono # 0.2 (L) 0.3 - 1.0 K/uL    Eos # 0.0 0.0 - 0.5 K/uL    Baso # 0.01 0.00 - 0.20 K/uL    nRBC 0 0 /100 WBC    Gran % 73.6 (H) 38.0 - 73.0 %    Lymph % 16.3 (L) 18.0 - 48.0 %    Mono % 9.3 4.0 - 15.0 %    Eosinophil % 0.0 0.0 - 8.0 %    Basophil % 0.4 0.0 - 1.9 %    Differential Method Automated    Lipid panel    Collection Time: 06/03/23  2:56 AM   Result Value Ref Range    Cholesterol 223 (H) 120 - 199 mg/dL    Triglycerides 140 30 - 150 mg/dL    HDL 54 40 - 75 mg/dL    LDL Cholesterol 141.0 63.0 - 159.0 mg/dL    HDL/Cholesterol Ratio 24.2 20.0 - 50.0 %    Total Cholesterol/HDL Ratio 4.1 2.0 - 5.0    Non-HDL Cholesterol 169 mg/dL     Imaging Results               MRI Brain W WO Contrast (Final result)  Result time 06/02/23 16:18:30      Final result by He Do MD (06/02/23 16:18:30)                   Impression:      Mass within the sphenoid sinus, sella, suprasellar cistern, and right cavernous sinus, as detailed above.  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  Other primary differential considerations include inverted papilloma, sinonasal carcinoma, lymphoma, and metastatic disease.  Further investigation and biopsy recommended.    This report was flagged in Epic as abnormal.    Electronically signed by resident: Mona Ramos  Date:    06/02/2023  Time:    15:26    Electronically signed by: He Do MD  Date:    06/02/2023  Time:    16:18               Narrative:    EXAMINATION:  MRI BRAIN W WO CONTRAST    CLINICAL HISTORY:  Presenting for weakness/presyncope and fall.  CTA showing sellar mass;    TECHNIQUE:  Multiplanar multisequence MR  imaging of the brain and sella was performed before and after the administration of 3 ml Gadavist intravenous contrast.    COMPARISON:  CT head without contrast 06/02/2023.    FINDINGS:  Ventricles are normal in size.  No hydrocephalus.    The T2/FLAIR hyperintense foci in the supratentorial white matter, nonspecific and suggestive of microvascular ischemic change.  No hemorrhage, edema or recent or remote major vascular distribution infarct.    Mild heterogeneously  enhancing mass within the sella and sphenoid sinus measuring up to 3.0 x 2.7 x 3.3 cm AP by TV by CC.  The mass appears to be predominantly centered within the sphenoid sinus, but there is intracranial extension through the sellar floor into the anterior sella and right cavernous sinus with suprasellar extension causing mild leftward and posterior deviation of the infundibulum.  Mass has diffuse diffusion restriction.  The mass abuts the anterior optic chiasm.  Normal pituitary tissue can be seen on the sagittal postcontrast images.    No extra-axial blood or fluid collections.    The T2 skull base flow voids are preserved.  Bone marrow signal intensity unremarkable.                                        CT Head Without Contrast (Final result)  Result time 06/02/23 08:45:18      Final result by He Do MD (06/02/23 08:45:18)                   Impression:      2.8 cm mass within the sphenoid sinus and sella, as above.  MRI with contrast recommended for further evaluation.    This report was flagged in Epic as abnormal.      Electronically signed by: He Do MD  Date:    06/02/2023  Time:    08:45               Narrative:    EXAMINATION:  CT HEAD WITHOUT CONTRAST    CLINICAL HISTORY:  Mental status change, unknown cause;    TECHNIQUE:  Low dose axial images were obtained through the head.  Coronal and sagittal reformations were also performed. Contrast was not administered.    COMPARISON:  CT of the head  02/21/2023    FINDINGS:  Noncontrast CT of the head demonstrates the presence of a hyperattenuating mass within the sella and sphenoid sinus measuring up to about 2.8 cm transverse by 2.8 cm AP 2 cm craniocaudal.  Is mostly within the sphenoid sinus, but there is a 2 cm intracranial portion centered within the anterior right sella and right cavernous sinus.  The lesion appears to extend through the floor of the sella to communicate intracranially.  Differential considerations include sinus mass such as mucocele versus benign or malignant soft tissue tumor.  Hyperattenuating pituitary adenoma or craniopharyngioma extending into the sphenoid sinuses no possibility.  MRI of the brain and sella with contrast is recommended.    Remainder of the brain demonstrates no significant acute finding.  Mild white matter changes are present similar to the prior head CT.  No hydrocephalus is noted.  A few small lytic lesions are again seen within the skull which are stable from the prior study.                                       CTA Chest Non-Coronary (PE Studies) (Final result)  Result time 06/02/23 09:28:15      Final result by Gianfranco Butler MD (06/02/23 09:28:15)                   Impression:      1. Examination considered diagnostic to the proximal segmental pulmonary arterial level without convincing evidence of acute pulmonary embolism.  2. In the left upper lobe, there appears to be a lobulated mass lesion measuring approximately 21 x 33 x 22 mm.  This appears new when compared to remote CT chest performed 08/06/2018 as well as when compared to 02/21/2023 cervical spine CT.  Finding concerning for neoplasm.  Unclear whether this may relate to patient's myeloma diagnosis noting that the adjoining posterior left 3rd and 4th rib cortex appears grossly intact, versus primary lung malignancy.  3. Pathologic appearing lower left cervical lymph node, possibly related to the above.      Electronically signed by: Gianfranco  [facility]  Date:    06/02/2023  Time:    09:28               Narrative:    EXAMINATION:  CTA CHEST NON CORONARY (PE STUDIES)    CLINICAL HISTORY:  Pulmonary embolism (PE) suspected, high prob;    TECHNIQUE:  Low dose axial images, sagittal and coronal reformations were obtained from the thoracic inlet to the lung bases following the IV administration of 100 mL of Omnipaque 350.  Contrast timing was optimized to evaluate the pulmonary arteries.  MIP images were performed.    COMPARISON:  V/Q scan 02/22/2023.  CT chest 08/06/2020 T    FINDINGS:  Exam quality: Examination considered diagnostic to the proximal segmental pulmonary arterial level.    Pulmonary embolism: No acute or chronic pulmonary embolism.    Central pulmonary arteries: Normal caliber.    Aorta: Nonaneurysmal.  Left-sided arch with conventional 3 vessel arch anatomy.  Mild-to-moderate atherosclerotic calcification.  Ectasia and tortuosity of brachiocephalic and right common and subclavian arteries noted.    Heart: No right heart strain. Normal size.    Coronary arteries: At least mild atherosclerotic calcifications.    Pericardium: Normal. No effusion, thickening, or calcification.    Support tubes and lines: None.    Base of neck/thyroid: Normal.    Lymph nodes: Suspect pathologic left level 4 lymph node measuring 21 mm short axis (axial series 2, image 53).    Esophagus: Normal.    Pleura: No effusion, thickening, or calcification.    Upper abdomen: Fluid attenuation lesions within the liver would be in keeping with cysts.  Additional subcentimeter hypoattenuating lesions in the liver too small to definitely characterize.  Dependent hyperattenuation within the gallbladder lumen would be in keeping with small stones and/or sludge.  No definite focal pericholecystic inflammation seen.    Body wall: Unremarkable    Airways: Central airways appear patent.    Lungs: Assessment of the lungs appears limited due to motion.  In the left upper lobe, there appears  to be a lobulated mass lesion measuring approximately 21 x 33 x 22 mm (axial series 2, image 65; coronal reformat series 601, image 96).  This appears new when compared to remote CT chest performed 08/06/2018 as well as when compared to 02/21/2023 cervical spine CT.  Elsewhere, areas of atelectasis and scar suggested.    Bones: Sequela of patient's multiple myeloma noted.  Mildly expansile lucent lesion is suggested in the proximal left humerus (axial series 3, image 84; coronal reformat series 601, image 77) with question of cortical thinning.  Status post posterior instrumented fusion of the thoracic and lumbar spine, with the more caudal aspects of the construct beyond the field of view examination.  Paired transpedicular screws secured with vertical stabilization rods spanning T9-T11 and L1.  No definite hardware complications seen.  Chronic presumed pathologic compression fracture T12, similar configuration to remote CT imaging of the lumbar spine performed 02/25/2019.

## 2023-06-10 ENCOUNTER — HEALTH MAINTENANCE LETTER (OUTPATIENT)
Age: 33
End: 2023-06-10

## 2023-07-19 NOTE — TELEPHONE ENCOUNTER
Pt is schedule for an EMB on Thursday, 1/16, with Dr. Berumen.  She is nervous and has questions regarding what exactly happens during an EMB.  She was wondering if she will be numbed up, I explained that she will not be numbed as there is not cutting involved.  I explained a small tube attached to a syringe will be inserted through the opening of her cervix and a sample of the endometrial lining will be taken out.  Pt states that it didn't sound bad, she asked if it was kind of like a feeling of getting an IUD in.  I told her she may feel cramping like that.    Pt asked how long it takes to get the results back.  I explained that it usually takes 7-10 days.  She will schedule a f/u appt with Dr. Berumen for around that timeframe to discuss the results.    Atiya Garcia RN    
Reason for Call:  Other call back    Detailed comments: patient calling because she is really nervous about her upcoming biopsy. She has questions in regards what is going to happen this day and would like a call back.       Phone Number Patient can be reached at: Home number on file [phone] (home)    Best Time: any    Can we leave a detailed message on this number? NO    Call taken on 1/13/2020 at 2:38 PM by Ruma Messer    
2.15

## 2023-10-11 NOTE — ED PROVIDER NOTES
[facility] EMERGENCY DEPARTMENT ([facility])   September 3, 2021  History     Chief Complaint   Patient presents with     Generalized Weakness     Afebrile     HPI  Katy Islas is a 31 year old female hearing impaired with PMH significant for chronic back pain (admitted to [facility] 8/24-8/29), GERD, depression, bipolar disorder, borderline personality disorder, Crohn's disease, and recently admitted to [facility] for generalized myalgias, malaise, low-grade temperature, and GLADYS/acute renal failure (8/30-9/1) who presents to the Emergency Department for evaluation of generalized weakness, leg pain, and a fever of 101.8 measured at home at 8:30 AM.  Patient states she has pain in both of her legs starting last night and that she cannot walk starting today.  She states that she was able to walk yesterday and was up vacuuming and cleaning.  She reports that she took some Tylenol this morning also at about 8:30 AM.  She also indicates that her thighs have been feeling very hot and sensitive.  She denies this feeling similar to her back pain or recent hospital admission.  Patient denies dysuria, cough, or shortness of breath.  She denies any symptoms in her arms.  Patient states she has been taking her pain medication, but this does not help.  She states that she lives alone.  Patient notes she has some midline low back pain but this is not significant/severe.  Patient also denies abdominal pain or any concerns with her Crohn's disease at this point.  No dysuric symptoms no bowel or bladder dysfunction otherwise.  No coughing chest pain shortness of breath neck pain headache other neuro changes noted.  No upper extremities complaints.    PAST MEDICAL HISTORY:   Past Medical History:   Diagnosis Date     Abdominal pain 10/31- 11/4/2005    [facility] admit for Crohn's     Allergic rhinitis, cause unspecified     Allergic rhinitis     Arthritis      C. difficile diarrhea     Past, no current diarrhea.          Patient: Felicia Back Date of Service: 10/11/2023   : 20[birth year] MRN: [id number]     SUBJECTIVE:     HISTORY OF PRESENT ILLNESS:  Felicia Back is a 18 month old female who presents today for well child check     Doing well     Mama, stephanie, hi and bye   Dog and baba, bath, no     Is picky about what she eats  Loves fruits  Meat is hit or miss- loves bbq sauce  Veggies- cooked carrots, peas, broccoli   Eats better for day care   Lots of milk   Water     Bowels are hard- hold her poop   Takes half cap of miralax     climbing on everything/interested in people     Cup with lid, tries with spoon and fork   Gives up after a few tries   Colors     Sleep- 50-50 for sleep- worse lately   Day 10 of a cold   Wakes up and cuddles and goes back to sleep         PAST MEDICAL HISTORY:  Past Medical History:   Diagnosis Date   • Hypoglycemia 2022   • Need for observation and evaluation of  for sepsis 2022   • [place] affected by other maternal medication 2022   • No known problems    • Respiratory distress of  2022       MEDICATIONS:  Current Outpatient Medications   Medication Sig   • pneumococcal 13-valent vaccine (PREVNAR 13) injectable suspension Inject 0.5 mLs into the muscle 1 time for 1 dose.     No current facility-administered medications for this visit.       ALLERGIES:  ALLERGIES:  No Known Allergies    PAST SURGICAL HISTORY:  History reviewed. No pertinent surgical history.    FAMILY HISTORY:  Family History   Problem Relation Age of Onset   • Migraine Mother        SOCIAL HISTORY:       Review of Systems      OBJECTIVE:     Physical Exam  Vitals and nursing note reviewed.   Constitutional:       General: She is active.      Appearance: Normal appearance. She is well-developed.   HENT:      Head: Normocephalic and atraumatic.      Right Ear: Tympanic membrane, ear canal and external ear normal.      Left Ear: Tympanic membrane, ear canal and external ear normal.      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, without mention of intractable epilepsy     pseudoseizures diagnosed after extensive neurologic eval     Migraine 07/21/12    D/C 07/22/12-[facility]     Migraine, unspecified, without mention of intractable migraine without mention of status migrainosus     Migraine     Mild intermittent asthma     mild intermittent     Mycoplasma infection in conditions classified elsewhere and of unspecified site      Other chronic pain     Back pain for 6 years     Renal disease     Kidney stones     Unspecified hearing loss     congenital hearing loss       PAST SURGICAL HISTORY:   Past Surgical History:   Procedure Laterality Date     AS REMOVAL OF COCCYX  10/18/2017     C FUSION OF SACROILIAC JOINT  10/26/2018     COLONOSCOPY  7/1/2009    [facility] [place]     COLONOSCOPY N/A 7/17/2019    Procedure: Colonoscopy, With Polypectomy And Biopsy;  Surgeon: Edwin Conde MD;  Location: [facility] OR     COLONOSCOPY WITH CO2 INSUFFLATION N/A 7/17/2019    Procedure: COLONOSCOPY, WITH CO2 INSUFFLATION;  Surgeon: Edwin Conde MD;  Location: [facility] OR     COMBINED ESOPHAGOSCOPY, GASTROSCOPY, DUODENOSCOPY (EGD) WITH CO2 INSUFFLATION N/A 4/12/2019    Procedure: COMBINED ESOPHAGOSCOPY, GASTROSCOPY, DUODENOSCOPY (EGD) WITH CO2 INSUFFLATION;  Surgeon: Edwin Conde MD;  Location: [facility] OR     ESOPHAGOSCOPY, GASTROSCOPY, DUODENOSCOPY (EGD), COMBINED N/A 4/12/2019    Procedure:  normal.      Mouth/Throat:      Mouth: Mucous membranes are moist.      Pharynx: Oropharynx is clear.   Eyes:      Conjunctiva/sclera: Conjunctivae normal.      Pupils: Pupils are equal, round, and reactive to light.   Cardiovascular:      Rate and Rhythm: Normal rate and regular rhythm.      Pulses: Normal pulses.      Heart sounds: No murmur heard.  Pulmonary:      Effort: Pulmonary effort is normal.      Breath sounds: Normal breath sounds. No wheezing.   Abdominal:      General: Bowel sounds are normal.      Palpations: Abdomen is soft.      Tenderness: There is no abdominal tenderness.   Musculoskeletal:         General: Normal range of motion.      Cervical back: Normal range of motion.   Skin:     General: Skin is warm.   Neurological:      General: No focal deficit present.      Mental Status: She is alert.         Visit Vitals  Temp 98.1 °F (36.7 °C)   Resp 33   Ht 33.5\" (85.1 cm)   Wt 12.1 kg (26 lb 9.6 oz)   BMI 16.66 kg/m²         Wt Readings from Last 1 Encounters:   10/11/23 12.1 kg (26 lb 9.6 oz) (89 %, Z= 1.22)*     * Growth percentiles are based on WHO (Girls, 0-2 years) data.          Assessment AND PLAN:     This is a 18 month old year-old female who presents with     Diagnoses and all orders for this visit:  Encounter for routine child health examination with abnormal findings  -     HIB VACC, PRP-OMP, IM  -     pneumococcal 13-valent vaccine (PREVNAR 13) injectable suspension; Inject 0.5 mLs into the muscle 1 time for 1 dose.  Chronic nasal congestion  -     SERVICE TO ENT  growth, dev normal   8 words - improving   Drink water/milk   vax today   To ENT to check congestion/adenoids       No follow-ups on file.    The patient indicated understanding of the diagnosis and agreed with the plan of care.      Nona Morgan, DO  10/11/2023   Combined Esophagoscopy, Gastroscopy, Duodenoscopy (Egd), Biopsy Single Or Multiple;  Surgeon: Edwin Conde MD;  Location: [facility] OR     FISTULOTOMY RECTUM N/A 4/30/2020    Procedure: EXAM UNDER ANESTHESIA, ANUS, parital fistulotomy;  Surgeon: Valentin Sanchez MD;  Location: [facility] OR     FUSION SPINE POSTERIOR MINIMALLY INVASIVE ONE LEVEL N/A 2/23/2017    Procedure: FUSION SPINE POSTERIOR MINIMALLY INVASIVE ONE LEVEL;  L4-5 Oblique Lateral Lumbar Interbody Fusion   Epidural steroid injection.   Transpedicular Bone marrow aspiration;  Surgeon: Jeniffer Eugene MD;  Location: [facility] OR     HC EEG AWAKE AND SLEEP      abnormal     HC MRI BRAIN W/O CONTRAST  12/00    normal     HC REMOVAL GALLBLADDER  8/5/2009    [facility].     HC UGI ENDOSCOPY DIAG W BIOPSY  11/11/09    Normal esophagus     ORTHOPEDIC SURGERY  October 19,2011    diskectomy L4-L5     ZZ COLONOSCOPY THRU STOMA WITH BIOPSY  10/29/2003    Impression is that of normal appearing colonoscopy, without evidence of rectal bleeding.     ZZ COLONOSCOPY THRU STOMA, DIAGNOSTIC  10/00    normal     ZZ COLONOSCOPY THRU STOMA, DIAGNOSTIC  Oct 2009    Dr óLpez- normal     [facility] UGI ENDOSCOPY, SIMPLE EXAM  7/00, 10/00    mild chronic esophagitis and duodenitis, neg H pylori     [facility] UGI ENDOSCOPY, SIMPLE EXAM  01/20/2005    Esophagogastroduodenoscopy, colonoscopy with biopsies.  [facility]     [facility]  7/1/2009    [facility].     [facility] UGI ENDOSCOPY, SIMPLE EXAM  11/11/2009    attempted upper GI, pt. could not tolerate procedure:[facility]       Past medical history, past surgical history, medications, and allergies were reviewed with the patient. Additional pertinent items: None    FAMILY HISTORY:   Family History   Problem Relation Age of Onset     Gastrointestinal Disease Brother         severe Crohn's     Neurologic Disorder Brother         Seizures post head  injury     Depression Brother      Substance Abuse Brother      Genitourinary Problems Father         kidney stones     Diabetes Father      Heart Disease Father         Open heart surgery     Breast Cancer Maternal Grandmother      Parkinsonism Maternal Grandmother      Cerebrovascular Disease Paternal Grandmother      Cancer Maternal Grandfather         Lung     Cardiovascular Paternal Grandfather         Heart Attack     Substance Abuse Mother         in recovery x1 year      Lung Cancer Paternal Uncle      Cancer Paternal Uncle      Lung Cancer Maternal Uncle      Colon Cancer Maternal Uncle        SOCIAL HISTORY:   Social History     Tobacco Use     Smoking status: Current Every Day Smoker     Packs/day: 0.25     Years: 5.00     Pack years: 1.25     Types: Cigarettes     Smokeless tobacco: Never Used   Substance Use Topics     Alcohol use: Not Currently     Comment: Not since last July 2018     Social history was reviewed with the patient. Additional pertinent items: None        Patient's Medications   New Prescriptions    No medications on file   Previous Medications    ALBUTEROL (PROAIR HFA/PROVENTIL HFA/VENTOLIN HFA) 108 (90 BASE) MCG/ACT INHALER    Inhale 2 puffs into the lungs every 6 hours as needed for shortness of breath / dyspnea or wheezing    ARIPIPRAZOLE (ABILIFY) 30 MG TABLET    Take 1 tablet (30 mg) by mouth At Bedtime    AZELASTINE (OPTIVAR) 0.05 % OPHTHALMIC SOLUTION    Place 1 drop into both eyes 2 times daily    BUPROPION (WELLBUTRIN XL) 300 MG 24 HR TABLET    Take 1 tablet (300 mg) by mouth every morning    BUSPIRONE (BUSPAR) 15 MG TABLET    Take 15 mg by mouth 3 times daily    CLONAZEPAM (KLONOPIN) 1 MG TABLET    Take 1 mg by mouth 2 times daily as needed for anxiety    CYCLOBENZAPRINE (FLEXERIL) 10 MG TABLET    Take 10 mg by mouth 3 times daily as needed    DICYCLOMINE (BENTYL) 20 MG TABLET    Take 1 tablet (20 mg) by mouth every 6 hours    DULOXETINE (CYMBALTA) 30 MG CAPSULE    Take 2  capsules (60 mg) by mouth daily    EPINEPHRINE (ANY BX GENERIC EQUIV) 0.3 MG/0.3ML INJECTION 2-PACK    Inject 0.3 mLs (0.3 mg) into the muscle once as needed for anaphylaxis    FUROSEMIDE (LASIX) 20 MG TABLET    Take 1 tablet (20 mg) by mouth daily    GABAPENTIN (NEURONTIN) 300 MG CAPSULE    Take 1 capsule (300 mg) by mouth 3 times daily    HYDROMORPHONE (DILAUDID) 2 MG TABLET    Take 1 tablet (2 mg) by mouth every 4 hours as needed for moderate to severe pain    LIDOCAINE (XYLOCAINE) 2 % SOLUTION    Swish and swallow 15 mLs in mouth every 4 hours as needed for other (GERD) ; Max 8 doses/24 hour period. Mix with 15 mLs Maalox or Mylanta.    MEDROXYPROGESTERONE (DEPO-PROVERA) 150 MG/ML IM INJECTION    Inject 1 mL (150 mg) into the muscle every 3 months    MESALAMINE ER (PENTASA) 500 MG CR CAPSULE    Take 2 capsules (1,000 mg) by mouth 3 times daily    METOPROLOL SUCCINATE ER (TOPROL-XL) 50 MG 24 HR TABLET    Take 50 mg by mouth daily    ONDANSETRON (ZOFRAN-ODT) 4 MG ODT TAB    Take 1 tablet (4 mg) by mouth every 6 hours as needed for nausea    PANTOPRAZOLE (PROTONIX) 40 MG EC TABLET    Take 1 tablet (40 mg) by mouth 2 times daily Take 30-60 minutes before a meal.    POLYETHYLENE GLYCOL (MIRALAX) 17 G PACKET    Take 17 g by mouth daily as needed for constipation    RIZATRIPTAN (MAXALT-MLT) 5 MG ODT    Take 1-2 tablets (5-10 mg) by mouth at onset of headache for migraine May repeat in 2 hours. Max 6 tablets/24 hours.    SUCRALFATE (CARAFATE) 1 GM TABLET    Take 1 tablet (1 g) by mouth 4 times daily as needed (heartburn)   Modified Medications    No medications on file   Discontinued Medications    No medications on file          Allergies   Allergen Reactions     Iohexol Hives     Other reaction(s): Hives  IV contrast; patient states she tolerates contrast if she gets benadryl before  IV contrast; patient states she tolerates contrast if she gets benadryl before     Other Environmental Allergy Rash     Plastic tape      Baclofen      hives     Bees Hives, Swelling and Difficulty breathing     Contrast Dye      Hives,   Updated 5/10/2016 CT Contrast.     Iodine Hives     Metoclopramide      Other reaction(s): Tremors  LW Reaction: shaking/sweating     Midazolam      Other reaction(s): Agitation     Monosodium Glutamate      Morphine Other (See Comments)     Difficulty with urination     Nsaids Other (See Comments)     GI BLEED x2     Other (Do Not Use) Other (See Comments)     Xanaflex- pt becomes disoriented and loses bladder control     Reglan [Metoclopramide Hcl] Other (See Comments)     shaking     Soma [Carisoprodol] Visual Disturbance     Sleep walking     Tizanidine      Topamax Other (See Comments)     Topamax [Topiramate] Nausea     Tingling  GI/Vomit     Tramadol      Severe Headache, Seizure Risk     Tylenol W/Codeine [Acetaminophen-Codeine] Nausea and Itching     Tylenol 3     Versed Other (See Comments)     Zolmitriptan      Makes face feel like its twitching     Droperidol Anxiety     Flu Virus Vaccine Rash      Arm swelling        Review of Systems   Constitutional: Positive for activity change, fatigue and fever (subjective).        Patient with difficulty ambulating now acutely since this morning   HENT: Positive for hearing loss (chronic). Negative for congestion, sinus pressure, sinus pain, sore throat and trouble swallowing.    Eyes: Negative for redness and visual disturbance.   Respiratory: Negative for cough and shortness of breath.    Cardiovascular: Negative for chest pain and leg swelling.   Gastrointestinal: Positive for nausea. Negative for abdominal pain, blood in stool, diarrhea and vomiting.   Genitourinary: Negative for difficulty urinating, dysuria and hematuria.   Musculoskeletal: Positive for back pain, gait problem and myalgias. Negative for arthralgias and neck stiffness.        Leg pain bilateral without swelling     Skin: Positive for color change (both legs reddened). Negative for wound.    Allergic/Immunologic: Negative for immunocompromised state.   Neurological: Positive for weakness. Negative for light-headedness and headaches.   Hematological: Does not bruise/bleed easily.   Psychiatric/Behavioral: Positive for decreased concentration and dysphoric mood. Negative for agitation, confusion, hallucinations and self-injury.        Due to pain issues   All other systems reviewed and are negative.        Physical Exam   BP: 111/78  Pulse: 85  Temp: 98.7  F (37.1  C)  Resp: 18  SpO2: 99 %      Physical Exam  Vitals and nursing note reviewed.   Constitutional:       General: She is in acute distress.      Appearance: She is well-developed. She is ill-appearing. She is not toxic-appearing or diaphoretic.      Comments: Patient nontoxic here in the ER.  She is tired but airway is protected   HENT:      Head: Normocephalic and atraumatic.      Nose: Nose normal.      Mouth/Throat:      Mouth: Mucous membranes are dry.   Eyes:      General: No scleral icterus.     Extraocular Movements: Extraocular movements intact.      Conjunctiva/sclera: Conjunctivae normal.      Pupils: Pupils are equal, round, and reactive to light.   Cardiovascular:      Rate and Rhythm: Normal rate.   Pulmonary:      Effort: No respiratory distress.      Breath sounds: No stridor. No wheezing.   Abdominal:      General: There is no distension.      Palpations: Abdomen is soft. There is no mass.      Tenderness: There is no abdominal tenderness.   Musculoskeletal:         General: Tenderness present. No deformity.      Cervical back: Normal range of motion and neck supple.      Right lower leg: No edema.      Left lower leg: No edema.   Skin:     General: Skin is warm and dry.      Capillary Refill: Capillary refill takes less than 2 seconds.      Coloration: Skin is not pale.      Findings: Erythema present. No rash.   Neurological:      General: No focal deficit present.      Mental Status: She is alert and oriented to person,  place, and time. Mental status is at baseline.   Psychiatric:      Comments: Patient's affect she is tired sleepy but well be arousable.   present.  Patient wants something for pain but at this point seems very sleepy.         ED Course     Patient valuated in the ER.  Patient continually monitored and IV was ordered and established liter normal saline given Tylenol for low-grade fever given here in the ER patient instructed no narcotics at this point as she is very lethargic/sleepy.  Airway is been intact otherwise.  Not showing sign of infection pregnancy test negative.  Drug screen positive for opiates along with benzo and HC.  Patient's drug regimen notes that of Dilaudid along with Klonopin.  White count was normal limits.  As noted CRP is elevated 29.  Hemoglobin is 12 platelets noted to be 214.  CK was 225.  Rapid strep testing negative.    As noted patient reassessed here in the ER.  Still wanting something for pain had some difficulty ambulating needed some assistance as far as bedside commode.  No bowel or bladder problems otherwise.    Reviewing records patient 5 days ago had an MRI of the lumbar spine with and without contrast did not show any significant findings at this point I feel with minimal low back pain without bowel or bladder dysfunction to repeat an MRI at this point does not seem needed at this point.  Also CT abdomen pelvis done 2 days ago was done and I do not feel a CT scan needs to be ordered again as the abdomen is benign and soft.    Whether this be some other underlying infection viral illness triggering somewhat of a myositis or myalgic type leg pain that is restricting her ambulation is unclear at this point versus some other deeper process.  Discussed with patient discussed with medicine are planning [place] [facility] [place] was just discharged a few days ago for further evaluation.             Procedures                          Results for  orders placed or performed during the hospital encounter of 09/03/21 (from the past 24 hour(s))   UA with Microscopic reflex to Culture    Specimen: Urine, Midstream   Result Value Ref Range    Color Urine Light Yellow Colorless, Straw, Light Yellow, Yellow    Appearance Urine Clear Clear    Glucose Urine Negative Negative mg/dL    Bilirubin Urine Negative Negative    Ketones Urine Negative Negative mg/dL    Specific Gravity Urine 1.013 1.003 - 1.035    Blood Urine Negative Negative    pH Urine 7.0 5.0 - 7.0    Protein Albumin Urine Negative Negative mg/dL    Urobilinogen Urine Normal Normal, 2.0 mg/dL    Nitrite Urine Negative Negative    Leukocyte Esterase Urine Negative Negative    Bacteria Urine Few (A) None Seen /HPF    Mucus Urine Present (A) None Seen /LPF    RBC Urine 1 <=2 /HPF    WBC Urine <1 <=5 /HPF    Squamous Epithelials Urine 8 (H) <=1 /HPF    Narrative    Urine Culture not indicated   Urine Drugs of Abuse Screen    Narrative    The following orders were created for panel order Urine Drugs of Abuse Screen.  Procedure                               Abnormality         Status                     ---------                               -----------         ------                     Drug abuse screen 1 urin...[[id number]]  Abnormal            Final result                 Please view results for these tests on the individual orders.   HCG qualitative urine   Result Value Ref Range    hCG Urine Qualitative Negative Negative   Drug abuse screen 1 urine (ED)   Result Value Ref Range    Amphetamines Urine Screen Negative Screen Negative    Barbiturates Urine Screen Negative Screen Negative    Benzodiazepines Urine Screen Positive (A) Screen Negative    Cannabinoids Urine Screen Positive (A) Screen Negative    Cocaine Urine Screen Negative Screen Negative    Opiates Urine Screen Positive (A) Screen Negative   CBC with platelets differential    Narrative    The following orders were created for panel order CBC  with platelets differential.  Procedure                               Abnormality         Status                     ---------                               -----------         ------                     CBC with platelets and d...[[id number]]  Abnormal            Final result                 Please view results for these tests on the individual orders.   CRP inflammation   Result Value Ref Range    CRP Inflammation 29.0 (H) 0.0 - 8.0 mg/L   INR   Result Value Ref Range    INR 0.97 0.86 - 1.14   Partial thromboplastin time   Result Value Ref Range    aPTT 25 22 - 38 Seconds   CK total   Result Value Ref Range     30 - 225 U/L   CBC with platelets and differential   Result Value Ref Range    WBC Count 6.2 4.0 - 11.0 10e3/uL    RBC Count 3.61 (L) 3.80 - 5.20 10e6/uL    Hemoglobin 12.0 11.7 - 15.7 g/dL    Hematocrit 34.9 (L) 35.0 - 47.0 %    MCV 97 78 - 100 fL    MCH 33.2 (H) 26.5 - 33.0 pg    MCHC 34.4 31.5 - 36.5 g/dL    RDW 12.1 10.0 - 15.0 %    Platelet Count 214 150 - 450 10e3/uL    % Neutrophils 60 %    % Lymphocytes 30 %    % Monocytes 9 %    % Eosinophils 1 %    % Basophils 0 %    % Immature Granulocytes 0 %    NRBCs per 100 WBC 0 <1 /100    Absolute Neutrophils 3.7 1.6 - 8.3 10e3/uL    Absolute Lymphocytes 1.8 0.8 - 5.3 10e3/uL    Absolute Monocytes 0.6 0.0 - 1.3 10e3/uL    Absolute Eosinophils 0.0 0.0 - 0.7 10e3/uL    Absolute Basophils 0.0 0.0 - 0.2 10e3/uL    Absolute Immature Granulocytes 0.0 <=0.0 10e3/uL    Absolute NRBCs 0.0 10e3/uL   Streptococcus A Rapid Scr w Reflx to PCR    Specimen: Throat; Swab   Result Value Ref Range    Group A Strep antigen Negative Negative   Asymptomatic COVID-19 Virus (Coronavirus) by PCR Nasopharyngeal    Specimen: Nasopharyngeal; Swab   Result Value Ref Range    SARS CoV2 PCR Negative Negative    Narrative    Testing was performed using the oralia  SARS-CoV-2 & Influenza A/B Assay on the oralia  [place]  System.  This test should be ordered for the detection of SARS-COV-2  in individuals who meet SARS-CoV-2 clinical and/or epidemiological criteria. Test performance is unknown in asymptomatic patients.  This test is for in vitro diagnostic use under the FDA EUA for laboratories certified under CLIA to perform moderate and/or high complexity testing. This test has not been FDA cleared or approved.  A negative test does not rule out the presence of PCR inhibitors in the specimen or target RNA in concentration below the limit of detection for the assay. The possibility of a false negative should be considered if the patient's recent exposure or clinical presentation suggests COVID-19.  [facility] are certified under the Clinical Laboratory Improvement Amendments of 1988 (CLIA-88) as qualified to perform moderate and/or high complexity laboratory testing.     *Note: Due to a large number of results and/or encounters for the requested time period, some results have not been displayed. A complete set of results can be found in Results Review.     Medications   lidocaine 1 % 0.1-1 mL (has no administration in time range)   lidocaine (LMX4) cream (has no administration in time range)   sodium chloride (PF) 0.9% PF flush 3 mL (has no administration in time range)   sodium chloride (PF) 0.9% PF flush 3 mL (has no administration in time range)   sodium chloride 0.9% infusion (has no administration in time range)   0.9% sodium chloride BOLUS (1,000 mLs Intravenous New Bag 9/3/21 1406)   acetaminophen (TYLENOL) tablet 1,000 mg (1,000 mg Oral Given 9/3/21 1406)             Assessments & Plan (with Medical Decision Making)  31-year-old female history of Crohn's disease hearing impaired chronic back pain with concerns of bilateral leg pain and weakness this morning along with febrile illness.  Patient does not have any signs of UTI abdominal symptoms are benign no significant low back pain recent CT of the abdomen and MRI of the the last 2 to 5 days without significant findings.   CRP is 29 other labs normal at this point rapid strep negative treated with Tylenol initially here in the ER.  As she is initially very sleepy possibly related to her Dilaudid that she is taking home along with her gabapentin along with her Klonopin etc.  At this point it is unclear why she has these bilateral leg weakness they are not swollen there was some hyperemia but now is resolved.  It may be viral it may be some other underlying infection recommended admission to [facility] for further evaluation and etiology of the symptoms.         I have reviewed the nursing notes.    I have reviewed the findings, diagnosis, plan and need for follow up with the patient.    New Prescriptions    No medications on file       Final diagnoses:   Bilateral leg pain   Bilateral leg weakness   Antalgic gait   Fever and chills   Chronic midline low back pain without sciatica   I, Lasha Carbajal, am serving as a trained medical scribe to document services personally performed by Gurmeet Hardy MD, based on the provider's statements to me.     I, Gurmeet Hardy MD, was physically present and have reviewed and verified the accuracy of this note documented by Lasha Carbajal.    --  Gurmeet Hardy MD  9/3/2021   [facility] EMERGENCY DEPARTMENT      This note was created at least in part by the use of dragon voice dictation system. Inadvertent typographical errors may still exist.  Gurmeet Hardy MD.    Patient evaluated in the emergency department during the COVID-19 pandemic period. Careful attention to patients safety was addressed throughout the evaluation. Evaluation and treatment management was initiated with disposition made efficiently and appropriate as possible to minimize any risk of potential exposure to patient during this evaluation.       Gurmeet Hardy MD  09/03/21 1924

## 2024-01-16 NOTE — TELEPHONE ENCOUNTER
Form completed and placed in MA task.     Aura Rosario PA-C     [Alert] : alert [Well Nourished] : well nourished [No Acute Distress] : no acute distress [Normal Sclera/Conjunctiva] : normal sclera/conjunctiva [PERRL] : pupils equal, round and reactive to light [No Proptosis] : no proptosis [No Lid Lag] : no lid lag [No Neck Mass] : no neck mass was observed [Supple] : the neck was supple [Thyroid Not Enlarged] : the thyroid was not enlarged [No Thyroid Nodules] : no palpable thyroid nodules [No Respiratory Distress] : no respiratory distress [No Accessory Muscle Use] : no accessory muscle use [Normal Rate and Effort] : normal respiratory rate and effort [Clear to Auscultation] : lungs were clear to auscultation bilaterally [Normal S1, S2] : normal S1 and S2 [No Murmurs] : no murmurs [Normal Rate] : heart rate was normal [Regular Rhythm] : with a regular rhythm [Oriented x3] : oriented to person, place, and time [Normal Affect] : the affect was normal [Normal Mood] : the mood was normal

## 2024-05-09 NOTE — TELEPHONE ENCOUNTER
"10 yo ROB presents to ER after school officials found 6 notes written by patient which concerned them - see 6 notes scanned into Epic.  The patient is not known to [facility].  he basic concept of those notes was: there are peers who do not like the patient - \"peers are bothering me but not to the point of bullying\".  Mood - similar for the past 1 year but worse over the past month - \"just emotional\".  Symptoms include: journaling how he was feeling but while in school; concentration is \"mostly\"; self esteem appears to be poor; mood do change- \"can vary day to day - sad/tired top occasionally happy - occasionally focused\"; \"picky eater\" but weight is stable.  The patient denies: any and all lethality; psychosis; paranoia; D/A use; any change with appetite/concentration/energy/sleep; hopelessness or anhedonia.    Collateral with patient's mother, Zuleika: \"No concerns at home; no behaviors at home; not as social with family for the past couple of months; the patient would stay in his room alone (with electronics) if he could - we make him come to meals and do family stuff to help him out of his shell\".    Clearance note to return to school; outpt referrals provided.  " Reason for call:  Patient reporting a symptom    Symptom or request:  Patient calling to speak with an RN.  She is still having issues with diarrhea.  Thank you    Duration (how long have symptoms been present): ongoing    Have you been treated for this before? Yes    Additional comments: none    Phone Number patient can be reached at:  Home number on file [phone] (home)    Best Time:  any    Can we leave a detailed message on this number:  YES    Call taken on 11/24/2020 at 2:09 PM by Damaris Monroe

## 2024-05-10 NOTE — TELEPHONE ENCOUNTER
Pending Prescriptions:                       Disp   Refills    clonazePAM (KLONOPIN) 0.5 MG tablet        15 tab*0        Sig: Take 1 tablet (0.5 mg) by mouth 2 times daily as           needed for anxiety      Routing refill request to provider for review/approval because:  Drug not on the [facility] refill protocol   Medication is listed in chart twice.      Last Written Prescription Date:  10/6/2020  Last Fill Quantity: 15,  # refills: 0   Last office visit: 9/22/2020 with prescribing provider:     Future Office Visit:      Isabella Vazquez RN BSN         [Joint Pain] : joint pain [Joint Stiffness] : joint stiffness [Negative] : Heme/Lymph

## 2024-08-03 ENCOUNTER — HEALTH MAINTENANCE LETTER (OUTPATIENT)
Age: 34
End: 2024-08-03

## 2024-10-22 ASSESSMENT — ANXIETY QUESTIONNAIRES: GAD7 TOTAL SCORE: 0

## 2025-01-13 NOTE — ED NOTES
Pt up to BSC with 2 person assist (at her request), minimal assistance needed by 2nd person. Pt was able to sit, hover on toilet wipe with 3 wipes, void and wipe on her own after and was assisted to bed.    136

## 2025-01-29 NOTE — TELEPHONE ENCOUNTER
Speech-Language Pathology Initial Evaluation    Today's date: 2025   Patient’s name: Gadiel Gallegos  : 1943  MRN: [id number]  Safety measures:  medication allergies  Referring provider: Angelika Edmonds,*    Encounter Diagnosis     ICD-10-CM    1. Other symbolic dysfunctions  R48.8       2. Cognitive communication deficit  R41.841       3. MCI (mild cognitive impairment)  G31.84 Ambulatory Referral to Speech Therapy          Visit tracking:  -Referring provider: Epic  -Billing guidelines: CMS  -Visit #1  -Insurance: [employer] (no auth required, BOMN)  -RE due 3/26/2025      Assessment: Patient presents with mild word finding difficulties at this time, characterized by reduced generative naming accuracy, reduced confrontational naming accuracy, hesitations/circumlocutions present impacting functional word finding at a conversational level at this time. Patient was administered TAWF-2 this date to further assess word finding difficulties. Patient with rating of Weak, demonstrating 4.16% delayed responses this date, noted to have WFI score of 74.  Word finding index scores of less than 90 are likely to have word finding problems, with the high probability of displaying word finding difficulties at home, in the classroom or work envirionment.  Patient would benefit from skilled SLP interventions at this time in order to facilitate improved word finding accuracy/naming accuracy and to train in strategies in order to facilitate improved word finding/naming allowing for improved functionality within environment.         Short-term Goals:    Patient will name an average of 15+ items in a category in 60 seconds over 5 trials using compensatory strategies and min cues to facilitate improved word retrieval skills, to be achieved in 6-8 weeks.    Patient will name an average of 10+ words that begin with a specific letter in 60 seconds over 5 trials using compensatory strategies and min cues to facilitate  Spoke with patient through  services. Left ear that is throbbing and hurting. Brown drainage. Just started today.  Vomiting and nausea.    Has been on a medrol dosepak and almost out of keflex.     She would like to be seen today as she thinks her ear is infected.   No new cough or fever.    Iggy Bernstein, EDYTAN, RN, PHN  [facility] ~ Registered Nurse  Clinic Triage ~ [place] [facility] [place]  May 13, 2021     improved word retrieval skills, to be achieved in 6-8 weeks.    Patient will name up to 3 synonyms for a given word with 80% accuracy to build expressive vocabulary for conversation, to be achieved in 6-8 weeks.    Patient will complete concrete and abstract categorization tasks to 80% accuracy to facilitate improved generative naming skills and working memory, to be achieved in 6-8 weeks.    Patient will demonstrate use of compensatory strategies to improve word finding with 80% accuracy provided cues as needed in order to reduce conversational word finding difficulties, communication break down and facilitate reduced episodes of delayed response time, to be achieved in 6-8 weeks.     Long-term Goals:    Patient will demonstrate adequate verbal expression during conversation without breakdowns or word finding deficits by discharge.      Patient will complete higher-level expressive language tasks (e.g., word definitions, idioms, synonym/antonyms, etc) with 80% accuracy to improve functional communication skills by discharge.         Plan  Patient would benefit from outpatient skilled Speech Therapy services: Speech-language therapy    Frequency: 1x weekly  Duration: 6-8 weeks    Intervention certification from: 1/29/2025  Intervention certification to:  3/26/2025      Subjective  History of present illness: Patient is a 81 y.o. male who was referred to outpatient skilled Speech Therapy services for a speech-language evaluation. Pt with medical history significant for MCI resulting in other symbolic dysfunctions, cognitive communication deficit at this time. Pt was previously able to communicate WFL however now presents with reduced word finding accuracy impacting functional language use at a conversational elvel. Pt requires skilled SLP interventions to reduce risk of further decline in function, risk for miscommunication, reduced participation in conversations within environment, and additional associated  "complications.     Patient subjective report:  Patient reports some word finding challenges (works with OT on cog), reports he started to note these changes s/p penitentiary, feels he did not have enough hobbies  Patient's goal(s): \"to improve\"     Pain: Absent (scale:  0 )    Hearing: WFL  Vision: WFL with glasses    Home environment/lifestyle: lives with wife  Highest level of education: High school  Vocational status: retired, owned own business      Objective (testing)    The Test of Adolescent/Adult Word finding-2 standardized assessment is an individually administered diagnostic tool for assessing word finding skills. It is intended for use with adolescents and adults, ages 12 years to 80 years. The TAWF-2 measures four naming sections: Picture naming: nouns, Sentence Completion Naming, Picture Naming: Verbs, and Picture Naming: Word Groups. The TAWF-2 also includes a Comprehension Check to assess knowledge of missed target words. The following results were obtained during the administration of the assessment: ** of note, patient is outside of normative data age ranges: scores listed below as guideline for therapeutic interventions      Complete Test Naming Raw Scores Comprehension raw scores   Section 1: Picture Naming: Nouns 17 24   Section 2: Sentence Completion Naming 9 13   Section 3: Picture Naming: Verbs 11 19   Section 4: Picture Naming: Word Groups 15 24   Total Raw Score   52        Word Finding Index (WFI) 74   Percentile Rank 4%aram   Descriptive Term Weak     (Word Finding Index scores less than 90 are likely to have word finding problems, high probability of displaying word finding difficulties at home, classroom or at work is high)    Informal Assessment of Word Finding:      Percentage of delayed responses: 4.16% (scores greater than or equal to 30% indicate slow response, less than 30% indicates fast response)         *Patient named 12 concrete category members (avg across 3 trials) in 60 sec " (norm=15+). -- BELOW AVERAGE    *Patient named 4 abstract category members (words beginning with letter 'M') in 60 sec (norm=10+). -- BELOW AVERAGE    Treatment  Pt trained in use of circumlocution strategies in order to improve word finding. Pt verbalized understanding, states will attempt to implement within home environment.
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
## 2025-08-16 ENCOUNTER — HEALTH MAINTENANCE LETTER (OUTPATIENT)
Age: 35
End: 2025-08-16

## (undated) DEVICE — SOL ADH LIQUID BENZOIN SWAB 0.6ML C1544

## (undated) DEVICE — PACK RECTAL UMMC

## (undated) DEVICE — SUCTION MANIFOLD DORNOCH ULTRA CART UL-CL500

## (undated) DEVICE — SOL WATER IRRIG 1000ML BOTTLE 2F7114

## (undated) DEVICE — PREP DURAPREP 26ML APL 8630

## (undated) DEVICE — SYR 03ML LL W/O NDL 309657

## (undated) DEVICE — GLOVE PROTEXIS POWDER FREE 8.0 ORTHOPEDIC 2D73ET80

## (undated) DEVICE — DECANTER VIAL 2006S

## (undated) DEVICE — DRSG STERI STRIP 1/2X4" R1547

## (undated) DEVICE — HYDROGEN PEROXIDE 3% 16OZ D0012

## (undated) DEVICE — DRAPE MAYO STAND 23X54 8337

## (undated) DEVICE — DRSG ABDOMINAL 07 1/2X8" 7197D

## (undated) DEVICE — GLOVE PROTEXIS POWDER FREE SMT 8.0  2D72PT80X

## (undated) DEVICE — SU VICRYL 3-0 SH 27" UND J416H

## (undated) DEVICE — Device

## (undated) DEVICE — SUCTION MANIFOLD NEPTUNE 2 SYS 4 PORT 0702-020-000

## (undated) DEVICE — ESU PENCIL SMOKE EVAC W/ROCKER SWITCH 0703-047-000

## (undated) DEVICE — ENDO CAP AND TUBING STERILE FOR ENDOGATOR  100130

## (undated) DEVICE — BAG CLEAR TRASH 1.3M 39X33" P4040C

## (undated) DEVICE — DRAPE C-ARM 60X42" 1013

## (undated) DEVICE — CATH IV ANGIO INTRO 12GA 382277

## (undated) DEVICE — GLOVE PROTEXIS POWDER FREE 6.5 ORTHOPEDIC 2D73ET65

## (undated) DEVICE — PREP CHLORAPREP 26ML TINTED ORANGE  260815

## (undated) DEVICE — PANTIES MESH LG/XLG 2PK 706M2

## (undated) DEVICE — KIT ENDO FIRST STEP DISINFECTANT 200ML W/POUCH EP-4

## (undated) DEVICE — PAD CHUX UNDERPAD 23X24" 7136

## (undated) DEVICE — WIPE PREMOIST CLEANSING WASHCLOTHS 7988

## (undated) DEVICE — PACK SMALL SPINE RIDGES

## (undated) DEVICE — PACK ENDOSCOPY GI CUSTOM UMMC

## (undated) DEVICE — SYR 20ML LL W/O NDL 302830

## (undated) DEVICE — CATH TRAY FOLEY 16FR W/URINE METER STATLOCK 902916

## (undated) DEVICE — LINEN DRAPE 54X72" 5467

## (undated) DEVICE — ENDO TUBING CO2 SMARTCAP STERILE DISP 100145CO2EXT

## (undated) DEVICE — DRAPE IOBAN ISOLATION VERTICAL 6619

## (undated) DEVICE — LINEN ORTHO ACL PACK 5447

## (undated) DEVICE — CUSHION INSERT LG PRONE VIEW JACKSON TABLE

## (undated) DEVICE — GLOVE PROTEXIS BLUE W/NEU-THERA 8.0  2D73EB80

## (undated) DEVICE — BLADE KNIFE SURG 11 371111

## (undated) DEVICE — LINEN TOWEL PACK X5 5464

## (undated) DEVICE — TAPE CLOTH 3" CARDINAL 3TRCL03

## (undated) DEVICE — SYR 10ML LL W/O NDL

## (undated) DEVICE — DRSG GAUZE 4X4" TRAY 6939

## (undated) DEVICE — ESU GROUND PAD ADULT W/CORD E7507

## (undated) DEVICE — NDL SPINAL 18GA 3.5" 405184

## (undated) DEVICE — DECANTER BAG 2002S

## (undated) DEVICE — GLOVE PROTEXIS MICRO 8.0  2D73PM80

## (undated) DEVICE — KIT CONNECTOR FOR OLYMPUS ENDOSCOPES DEFENDO 100310

## (undated) DEVICE — DRSG GAUZE 4X4" TRAY

## (undated) DEVICE — BASIN SET SINGLE STERILE 13752-624

## (undated) DEVICE — SU VICRYL 2-0 CT-2 CR 8X18" J726D

## (undated) RX ORDER — FENTANYL CITRATE 50 UG/ML
INJECTION, SOLUTION INTRAMUSCULAR; INTRAVENOUS
Status: DISPENSED
Start: 2017-02-23

## (undated) RX ORDER — LIDOCAINE HYDROCHLORIDE 20 MG/ML
INJECTION, SOLUTION EPIDURAL; INFILTRATION; INTRACAUDAL; PERINEURAL
Status: DISPENSED
Start: 2020-04-30

## (undated) RX ORDER — DIPHENHYDRAMINE HYDROCHLORIDE 50 MG/ML
INJECTION INTRAMUSCULAR; INTRAVENOUS
Status: DISPENSED
Start: 2017-02-23

## (undated) RX ORDER — PROPOFOL 10 MG/ML
INJECTION, EMULSION INTRAVENOUS
Status: DISPENSED
Start: 2020-04-30

## (undated) RX ORDER — FENTANYL CITRATE 50 UG/ML
INJECTION, SOLUTION INTRAMUSCULAR; INTRAVENOUS
Status: DISPENSED
Start: 2019-07-17

## (undated) RX ORDER — PROPOFOL 10 MG/ML
INJECTION, EMULSION INTRAVENOUS
Status: DISPENSED
Start: 2017-02-23

## (undated) RX ORDER — ONDANSETRON 2 MG/ML
INJECTION INTRAMUSCULAR; INTRAVENOUS
Status: DISPENSED
Start: 2019-04-12

## (undated) RX ORDER — PROPOFOL 10 MG/ML
INJECTION, EMULSION INTRAVENOUS
Status: DISPENSED
Start: 2022-01-11

## (undated) RX ORDER — LIDOCAINE HYDROCHLORIDE AND EPINEPHRINE 10; 10 MG/ML; UG/ML
INJECTION, SOLUTION INFILTRATION; PERINEURAL
Status: DISPENSED
Start: 2017-02-23

## (undated) RX ORDER — HYDROXYZINE HYDROCHLORIDE 50 MG/1
TABLET, FILM COATED ORAL
Status: DISPENSED
Start: 2017-02-23

## (undated) RX ORDER — HYDROMORPHONE HYDROCHLORIDE 1 MG/ML
INJECTION, SOLUTION INTRAMUSCULAR; INTRAVENOUS; SUBCUTANEOUS
Status: DISPENSED
Start: 2020-04-30

## (undated) RX ORDER — DEXAMETHASONE SODIUM PHOSPHATE 4 MG/ML
INJECTION, SOLUTION INTRA-ARTICULAR; INTRALESIONAL; INTRAMUSCULAR; INTRAVENOUS; SOFT TISSUE
Status: DISPENSED
Start: 2017-02-23

## (undated) RX ORDER — TRIAMCINOLONE ACETONIDE 40 MG/ML
INJECTION, SUSPENSION INTRA-ARTICULAR; INTRAMUSCULAR
Status: DISPENSED
Start: 2017-02-23

## (undated) RX ORDER — LIDOCAINE HYDROCHLORIDE 10 MG/ML
INJECTION, SOLUTION EPIDURAL; INFILTRATION; INTRACAUDAL; PERINEURAL
Status: DISPENSED
Start: 2017-02-23

## (undated) RX ORDER — GLYCOPYRROLATE 0.2 MG/ML
INJECTION INTRAMUSCULAR; INTRAVENOUS
Status: DISPENSED
Start: 2017-02-23

## (undated) RX ORDER — GLYCOPYRROLATE 0.2 MG/ML
INJECTION, SOLUTION INTRAMUSCULAR; INTRAVENOUS
Status: DISPENSED
Start: 2020-04-30

## (undated) RX ORDER — BUPIVACAINE HYDROCHLORIDE 7.5 MG/ML
INJECTION, SOLUTION EPIDURAL; RETROBULBAR
Status: DISPENSED
Start: 2017-02-23

## (undated) RX ORDER — CEFAZOLIN SODIUM 2 G/100ML
INJECTION, SOLUTION INTRAVENOUS
Status: DISPENSED
Start: 2017-02-23

## (undated) RX ORDER — ACETAMINOPHEN 10 MG/ML
INJECTION, SOLUTION INTRAVENOUS
Status: DISPENSED
Start: 2017-02-23

## (undated) RX ORDER — BUPIVACAINE HYDROCHLORIDE 2.5 MG/ML
INJECTION, SOLUTION EPIDURAL; INFILTRATION; INTRACAUDAL
Status: DISPENSED
Start: 2017-02-23

## (undated) RX ORDER — LIDOCAINE 40 MG/G
CREAM TOPICAL
Status: DISPENSED
Start: 2020-04-30

## (undated) RX ORDER — FENTANYL CITRATE 50 UG/ML
INJECTION, SOLUTION INTRAMUSCULAR; INTRAVENOUS
Status: DISPENSED
Start: 2020-04-30

## (undated) RX ORDER — BUPIVACAINE HYDROCHLORIDE AND EPINEPHRINE 2.5; 5 MG/ML; UG/ML
INJECTION, SOLUTION EPIDURAL; INFILTRATION; INTRACAUDAL; PERINEURAL
Status: DISPENSED
Start: 2020-04-30

## (undated) RX ORDER — ONDANSETRON 2 MG/ML
INJECTION INTRAMUSCULAR; INTRAVENOUS
Status: DISPENSED
Start: 2020-04-30

## (undated) RX ORDER — PROPOFOL 10 MG/ML
INJECTION, EMULSION INTRAVENOUS
Status: DISPENSED
Start: 2019-07-17

## (undated) RX ORDER — HYDROMORPHONE HYDROCHLORIDE 1 MG/ML
INJECTION, SOLUTION INTRAMUSCULAR; INTRAVENOUS; SUBCUTANEOUS
Status: DISPENSED
Start: 2017-02-23